# Patient Record
Sex: MALE | Race: WHITE | Employment: OTHER | URBAN - METROPOLITAN AREA
[De-identification: names, ages, dates, MRNs, and addresses within clinical notes are randomized per-mention and may not be internally consistent; named-entity substitution may affect disease eponyms.]

---

## 2017-07-18 ENCOUNTER — HOSPITAL ENCOUNTER (OUTPATIENT)
Dept: WOUND CARE | Age: 42
Discharge: HOME | End: 2017-07-18
Attending: FAMILY MEDICINE | Admitting: FAMILY MEDICINE

## 2017-07-18 VITALS
SYSTOLIC BLOOD PRESSURE: 121 MMHG | WEIGHT: 265 LBS | BODY MASS INDEX: 37.94 KG/M2 | DIASTOLIC BLOOD PRESSURE: 78 MMHG | TEMPERATURE: 97.3 F | HEART RATE: 74 BPM | RESPIRATION RATE: 16 BRPM | HEIGHT: 70 IN

## 2017-07-18 DIAGNOSIS — E11.621 DIABETIC FOOT ULCER WITH OSTEOMYELITIS (HCC): ICD-10-CM

## 2017-07-18 DIAGNOSIS — E11.69 DIABETIC FOOT ULCER WITH OSTEOMYELITIS (HCC): ICD-10-CM

## 2017-07-18 DIAGNOSIS — L97.509 DIABETIC FOOT ULCER WITH OSTEOMYELITIS (HCC): ICD-10-CM

## 2017-07-18 DIAGNOSIS — M86.9 DIABETIC FOOT ULCER WITH OSTEOMYELITIS (HCC): ICD-10-CM

## 2017-07-18 DIAGNOSIS — L97.521 PLANTAR ULCER OF LEFT FOOT, LIMITED TO BREAKDOWN OF SKIN (HCC): ICD-10-CM

## 2017-07-18 PROBLEM — L97.519 DIABETIC ULCER OF BOTH FEET (HCC): Status: ACTIVE | Noted: 2017-07-18

## 2017-07-18 PROBLEM — L97.529 DIABETIC ULCER OF BOTH FEET (HCC): Status: ACTIVE | Noted: 2017-07-18

## 2017-07-18 ASSESSMENT — PAIN DESCRIPTION - FREQUENCY: FREQUENCY: CONTINUOUS

## 2017-07-18 ASSESSMENT — PAIN SCALES - GENERAL: PAINLEVEL_OUTOF10: 6

## 2017-07-18 ASSESSMENT — PAIN DESCRIPTION - PAIN TYPE: TYPE: ACUTE PAIN

## 2017-07-18 ASSESSMENT — PAIN DESCRIPTION - PROGRESSION: CLINICAL_PROGRESSION: NOT CHANGED

## 2017-07-18 ASSESSMENT — PAIN DESCRIPTION - ORIENTATION: ORIENTATION: LEFT;RIGHT

## 2017-07-18 ASSESSMENT — PAIN DESCRIPTION - LOCATION: LOCATION: FOOT

## 2017-07-18 ASSESSMENT — PAIN DESCRIPTION - DESCRIPTORS: DESCRIPTORS: THROBBING

## 2017-07-18 ASSESSMENT — PAIN DESCRIPTION - ONSET: ONSET: ON-GOING

## 2017-07-24 ENCOUNTER — HOSPITAL ENCOUNTER (OUTPATIENT)
Dept: GENERAL RADIOLOGY | Age: 42
Discharge: HOME | End: 2017-07-24
Attending: FAMILY MEDICINE | Admitting: FAMILY MEDICINE

## 2017-07-24 LAB
ALBUMIN SERPL-MCNC: 3.8 GM/DL (ref 3.4–5)
ALP BLD-CCNC: 73 IU/L (ref 40–128)
ALT SERPL-CCNC: 19 U/L (ref 10–40)
ANION GAP SERPL CALCULATED.3IONS-SCNC: 12 MMOL/L (ref 4–16)
AST SERPL-CCNC: 15 IU/L (ref 15–37)
BILIRUB SERPL-MCNC: 0.4 MG/DL (ref 0–1)
BUN BLDV-MCNC: 18 MG/DL (ref 6–23)
CALCIUM SERPL-MCNC: 8.9 MG/DL (ref 8.3–10.6)
CHLORIDE BLD-SCNC: 98 MMOL/L (ref 99–110)
CHOLESTEROL: 173 MG/DL
CO2: 26 MMOL/L (ref 21–32)
CREAT SERPL-MCNC: 0.7 MG/DL (ref 0.9–1.3)
CREATININE URINE: 78.2 MG/DL (ref 39–259)
CULTURE: NORMAL
ERYTHROCYTE SEDIMENTATION RATE: 46 MM/HR (ref 0–15)
ESTIMATED AVERAGE GLUCOSE: 283 MG/DL
GFR AFRICAN AMERICAN: >60 ML/MIN/1.73M2
GFR NON-AFRICAN AMERICAN: >60 ML/MIN/1.73M2
GLUCOSE BLD-MCNC: 341 MG/DL (ref 70–140)
HBA1C MFR BLD: 11.5 % (ref 4.2–6.3)
HCT VFR BLD CALC: 42.1 % (ref 42–52)
HDLC SERPL-MCNC: 35 MG/DL
HEMOGLOBIN: 14.9 GM/DL (ref 13.5–18)
LDL CHOLESTEROL CALCULATED: 92 MG/DL
MAGNESIUM: 2.2 MG/DL (ref 1.8–2.4)
MCH RBC QN AUTO: 30.9 PG (ref 27–31)
MCHC RBC AUTO-ENTMCNC: 35.4 % (ref 32–36)
MCV RBC AUTO: 87.3 FL (ref 78–100)
MICROALBUMIN/CREAT 24H UR: 91 MG/DL
MICROALBUMIN/CREAT 24H UR: ABNORMAL MG/G{CREAT}
MICROALBUMIN/CREAT UR-RTO: 1163.7 MG/G CREAT (ref 0–30)
MICROALBUMIN/CREAT UR-RTO: ABNORMAL
ORGANISM: NORMAL
PDW BLD-RTO: 11.9 % (ref 11.7–14.9)
PLATELET # BLD: 208 K/CU MM (ref 140–440)
PMV BLD AUTO: 11.3 FL (ref 7.5–11.1)
POTASSIUM SERPL-SCNC: 4.6 MMOL/L (ref 3.5–5.1)
RBC # BLD: 4.82 M/CU MM (ref 4.6–6.2)
REPORT STATUS: NORMAL
REQUEST PROBLEM: NORMAL
SODIUM BLD-SCNC: 136 MMOL/L (ref 135–145)
SPECIMEN: NORMAL
T4 FREE: 1.25 NG/DL (ref 0.9–1.8)
TOTAL PROTEIN: 6.8 GM/DL (ref 6.4–8.2)
TRIGL SERPL-MCNC: 231 MG/DL
TSH HIGH SENSITIVITY: 2.3 UIU/ML (ref 0.27–4.2)
VITAMIN D 25-HYDROXY: 7.29 NG/ML
WBC # BLD: 9.1 K/CU MM (ref 4–10.5)

## 2017-07-26 ENCOUNTER — HOSPITAL ENCOUNTER (OUTPATIENT)
Dept: WOUND CARE | Age: 42
Discharge: HOME | End: 2017-07-26
Attending: INTERNAL MEDICINE | Admitting: INTERNAL MEDICINE

## 2017-07-26 VITALS
RESPIRATION RATE: 16 BRPM | TEMPERATURE: 97 F | DIASTOLIC BLOOD PRESSURE: 82 MMHG | HEART RATE: 78 BPM | SYSTOLIC BLOOD PRESSURE: 135 MMHG

## 2017-07-26 DIAGNOSIS — L97.512 DIABETIC ULCER OF RIGHT FOOT ASSOCIATED WITH TYPE 2 DIABETES MELLITUS, WITH FAT LAYER EXPOSED (HCC): ICD-10-CM

## 2017-07-26 DIAGNOSIS — E11.621 DIABETIC ULCER OF RIGHT FOOT ASSOCIATED WITH TYPE 2 DIABETES MELLITUS, WITH FAT LAYER EXPOSED (HCC): ICD-10-CM

## 2017-07-26 DIAGNOSIS — E11.621 DIABETIC ULCER OF LEFT FOOT ASSOCIATED WITH TYPE 2 DIABETES MELLITUS, WITH FAT LAYER EXPOSED (HCC): Primary | ICD-10-CM

## 2017-07-26 DIAGNOSIS — L97.522 DIABETIC ULCER OF LEFT FOOT ASSOCIATED WITH TYPE 2 DIABETES MELLITUS, WITH FAT LAYER EXPOSED (HCC): Primary | ICD-10-CM

## 2017-07-26 PROCEDURE — 11042 DBRDMT SUBQ TIS 1ST 20SQCM/<: CPT | Performed by: INTERNAL MEDICINE

## 2017-07-26 PROCEDURE — 97597 DBRDMT OPN WND 1ST 20 CM/<: CPT | Performed by: INTERNAL MEDICINE

## 2017-07-26 RX ORDER — CIPROFLOXACIN 500 MG/1
500 TABLET, FILM COATED ORAL 2 TIMES DAILY
Qty: 20 TABLET | Refills: 0 | Status: SHIPPED | OUTPATIENT
Start: 2017-07-26 | End: 2017-08-05

## 2017-08-01 ENCOUNTER — HOSPITAL ENCOUNTER (OUTPATIENT)
Dept: WOUND CARE | Age: 42
Discharge: HOME | End: 2017-08-01
Attending: FAMILY MEDICINE | Admitting: FAMILY MEDICINE

## 2017-08-01 VITALS
TEMPERATURE: 97.6 F | DIASTOLIC BLOOD PRESSURE: 85 MMHG | SYSTOLIC BLOOD PRESSURE: 144 MMHG | RESPIRATION RATE: 16 BRPM | HEART RATE: 83 BPM

## 2017-08-01 DIAGNOSIS — E11.621 DIABETIC ULCER OF TOE OF LEFT FOOT ASSOCIATED WITH TYPE 2 DIABETES MELLITUS, WITH FAT LAYER EXPOSED (HCC): Primary | ICD-10-CM

## 2017-08-01 DIAGNOSIS — L97.522 DIABETIC ULCER OF TOE OF LEFT FOOT ASSOCIATED WITH TYPE 2 DIABETES MELLITUS, WITH FAT LAYER EXPOSED (HCC): Primary | ICD-10-CM

## 2017-08-01 DIAGNOSIS — E11.621 DIABETIC ULCER OF TOE OF RIGHT FOOT ASSOCIATED WITH TYPE 2 DIABETES MELLITUS, WITH FAT LAYER EXPOSED (HCC): ICD-10-CM

## 2017-08-01 DIAGNOSIS — L97.512 DIABETIC ULCER OF TOE OF RIGHT FOOT ASSOCIATED WITH TYPE 2 DIABETES MELLITUS, WITH FAT LAYER EXPOSED (HCC): ICD-10-CM

## 2017-08-18 ENCOUNTER — HOSPITAL ENCOUNTER (OUTPATIENT)
Dept: WOUND CARE | Age: 42
Discharge: HOME | End: 2017-08-18
Attending: INTERNAL MEDICINE | Admitting: INTERNAL MEDICINE

## 2017-08-18 VITALS
RESPIRATION RATE: 16 BRPM | HEART RATE: 85 BPM | SYSTOLIC BLOOD PRESSURE: 114 MMHG | TEMPERATURE: 96.8 F | DIASTOLIC BLOOD PRESSURE: 62 MMHG

## 2017-08-18 DIAGNOSIS — L97.512 DIABETIC ULCER OF TOE OF RIGHT FOOT ASSOCIATED WITH TYPE 2 DIABETES MELLITUS, WITH FAT LAYER EXPOSED (HCC): ICD-10-CM

## 2017-08-18 DIAGNOSIS — L97.522 DIABETIC ULCER OF TOE OF LEFT FOOT ASSOCIATED WITH TYPE 2 DIABETES MELLITUS, WITH FAT LAYER EXPOSED (HCC): Primary | ICD-10-CM

## 2017-08-18 DIAGNOSIS — E11.621 DIABETIC ULCER OF TOE OF RIGHT FOOT ASSOCIATED WITH TYPE 2 DIABETES MELLITUS, WITH FAT LAYER EXPOSED (HCC): ICD-10-CM

## 2017-08-18 DIAGNOSIS — E11.621 DIABETIC ULCER OF TOE OF LEFT FOOT ASSOCIATED WITH TYPE 2 DIABETES MELLITUS, WITH FAT LAYER EXPOSED (HCC): Primary | ICD-10-CM

## 2017-08-18 RX ORDER — AMMONIUM LACTATE 12 G/100G
LOTION TOPICAL
Qty: 225 G | Refills: 1 | Status: SHIPPED | OUTPATIENT
Start: 2017-08-18 | End: 2018-05-18

## 2017-08-18 ASSESSMENT — PAIN SCALES - GENERAL: PAINLEVEL_OUTOF10: 6

## 2017-08-18 ASSESSMENT — PAIN DESCRIPTION - FREQUENCY: FREQUENCY: CONTINUOUS

## 2017-08-18 ASSESSMENT — PAIN DESCRIPTION - ORIENTATION: ORIENTATION: LEFT

## 2017-08-18 ASSESSMENT — PAIN DESCRIPTION - LOCATION: LOCATION: FOOT

## 2017-08-18 ASSESSMENT — PAIN DESCRIPTION - PROGRESSION: CLINICAL_PROGRESSION: NOT CHANGED

## 2017-08-18 ASSESSMENT — PAIN DESCRIPTION - ONSET: ONSET: ON-GOING

## 2017-08-18 ASSESSMENT — PAIN DESCRIPTION - DESCRIPTORS: DESCRIPTORS: ACHING;TENDER

## 2017-08-18 ASSESSMENT — PAIN DESCRIPTION - PAIN TYPE: TYPE: ACUTE PAIN

## 2017-08-25 ENCOUNTER — HOSPITAL ENCOUNTER (OUTPATIENT)
Dept: WOUND CARE | Age: 42
Discharge: HOME | End: 2017-08-25
Attending: INTERNAL MEDICINE | Admitting: INTERNAL MEDICINE

## 2017-08-25 VITALS — RESPIRATION RATE: 16 BRPM | TEMPERATURE: 96.6 F

## 2017-08-25 DIAGNOSIS — L97.522 DIABETIC ULCER OF TOE OF LEFT FOOT ASSOCIATED WITH TYPE 2 DIABETES MELLITUS, WITH FAT LAYER EXPOSED (HCC): Primary | ICD-10-CM

## 2017-08-25 DIAGNOSIS — L97.512 DIABETIC ULCER OF TOE OF RIGHT FOOT ASSOCIATED WITH TYPE 2 DIABETES MELLITUS, WITH FAT LAYER EXPOSED (HCC): ICD-10-CM

## 2017-08-25 DIAGNOSIS — E11.621 DIABETIC ULCER OF TOE OF RIGHT FOOT ASSOCIATED WITH TYPE 2 DIABETES MELLITUS, WITH FAT LAYER EXPOSED (HCC): ICD-10-CM

## 2017-08-25 DIAGNOSIS — E11.621 DIABETIC ULCER OF TOE OF LEFT FOOT ASSOCIATED WITH TYPE 2 DIABETES MELLITUS, WITH FAT LAYER EXPOSED (HCC): Primary | ICD-10-CM

## 2017-08-25 ASSESSMENT — PAIN DESCRIPTION - ORIENTATION: ORIENTATION: LEFT

## 2017-08-25 ASSESSMENT — PAIN DESCRIPTION - PAIN TYPE: TYPE: CHRONIC PAIN

## 2017-08-25 ASSESSMENT — PAIN DESCRIPTION - LOCATION: LOCATION: FOOT

## 2017-08-25 ASSESSMENT — PAIN DESCRIPTION - ONSET: ONSET: ON-GOING

## 2017-08-25 ASSESSMENT — PAIN DESCRIPTION - FREQUENCY: FREQUENCY: CONTINUOUS

## 2017-08-25 ASSESSMENT — PAIN SCALES - GENERAL: PAINLEVEL_OUTOF10: 5

## 2017-08-25 ASSESSMENT — PAIN DESCRIPTION - DESCRIPTORS: DESCRIPTORS: SORE;ACHING

## 2017-08-25 ASSESSMENT — PAIN DESCRIPTION - PROGRESSION: CLINICAL_PROGRESSION: NOT CHANGED

## 2017-08-31 ENCOUNTER — HOSPITAL ENCOUNTER (OUTPATIENT)
Dept: WOUND CARE | Age: 42
Discharge: HOME | End: 2017-08-31
Attending: ORTHOPAEDIC SURGERY | Admitting: ORTHOPAEDIC SURGERY

## 2017-08-31 VITALS
HEART RATE: 84 BPM | SYSTOLIC BLOOD PRESSURE: 124 MMHG | DIASTOLIC BLOOD PRESSURE: 80 MMHG | TEMPERATURE: 98.7 F | RESPIRATION RATE: 18 BRPM

## 2017-08-31 DIAGNOSIS — E11.621 DIABETIC ULCER OF LEFT MIDFOOT ASSOCIATED WITH TYPE 2 DIABETES MELLITUS, WITH FAT LAYER EXPOSED (HCC): Primary | ICD-10-CM

## 2017-08-31 DIAGNOSIS — L97.422 DIABETIC ULCER OF LEFT MIDFOOT ASSOCIATED WITH TYPE 2 DIABETES MELLITUS, WITH FAT LAYER EXPOSED (HCC): Primary | ICD-10-CM

## 2017-08-31 PROBLEM — L97.512 DIABETIC ULCER OF RIGHT FOOT ASSOCIATED WITH TYPE 2 DIABETES MELLITUS, WITH FAT LAYER EXPOSED (HCC): Status: RESOLVED | Noted: 2017-07-18 | Resolved: 2017-08-31

## 2017-09-12 ENCOUNTER — HOSPITAL ENCOUNTER (OUTPATIENT)
Dept: WOUND CARE | Age: 42
Discharge: HOME | End: 2017-09-12
Attending: NURSE PRACTITIONER | Admitting: NURSE PRACTITIONER

## 2017-09-12 VITALS
DIASTOLIC BLOOD PRESSURE: 95 MMHG | TEMPERATURE: 97.1 F | SYSTOLIC BLOOD PRESSURE: 139 MMHG | HEART RATE: 83 BPM | RESPIRATION RATE: 18 BRPM

## 2017-09-12 DIAGNOSIS — E11.621 DIABETIC ULCER OF LEFT MIDFOOT ASSOCIATED WITH TYPE 2 DIABETES MELLITUS, WITH FAT LAYER EXPOSED (HCC): Primary | ICD-10-CM

## 2017-09-12 DIAGNOSIS — L97.422 DIABETIC ULCER OF LEFT MIDFOOT ASSOCIATED WITH TYPE 2 DIABETES MELLITUS, WITH FAT LAYER EXPOSED (HCC): Primary | ICD-10-CM

## 2017-09-12 PROCEDURE — 11042 DBRDMT SUBQ TIS 1ST 20SQCM/<: CPT | Performed by: NURSE PRACTITIONER

## 2017-09-12 ASSESSMENT — PAIN DESCRIPTION - ORIENTATION: ORIENTATION: LEFT

## 2017-09-12 ASSESSMENT — PAIN DESCRIPTION - FREQUENCY: FREQUENCY: CONTINUOUS

## 2017-09-12 ASSESSMENT — PAIN DESCRIPTION - LOCATION: LOCATION: FOOT

## 2017-09-12 ASSESSMENT — PAIN SCALES - GENERAL: PAINLEVEL_OUTOF10: 5

## 2017-09-12 ASSESSMENT — PAIN DESCRIPTION - ONSET: ONSET: ON-GOING

## 2017-09-12 ASSESSMENT — PAIN DESCRIPTION - PROGRESSION: CLINICAL_PROGRESSION: NOT CHANGED

## 2017-09-12 ASSESSMENT — PAIN DESCRIPTION - PAIN TYPE: TYPE: CHRONIC PAIN

## 2017-09-12 ASSESSMENT — PAIN DESCRIPTION - DESCRIPTORS: DESCRIPTORS: TENDER;THROBBING

## 2017-09-16 LAB
CULTURE: NORMAL
ORGANISM: NORMAL
REPORT STATUS: NORMAL
REQUEST PROBLEM: NORMAL
SPECIMEN: NORMAL

## 2017-12-03 PROBLEM — L02.91 ABSCESS: Status: ACTIVE | Noted: 2017-12-03

## 2017-12-08 ENCOUNTER — HOSPITAL ENCOUNTER (OUTPATIENT)
Dept: WOUND CARE | Age: 42
Discharge: HOME | End: 2017-12-08
Attending: ORTHOPAEDIC SURGERY | Admitting: ORTHOPAEDIC SURGERY

## 2017-12-08 VITALS
DIASTOLIC BLOOD PRESSURE: 83 MMHG | HEART RATE: 91 BPM | SYSTOLIC BLOOD PRESSURE: 124 MMHG | TEMPERATURE: 97.1 F | BODY MASS INDEX: 37.22 KG/M2 | WEIGHT: 260 LBS | HEIGHT: 70 IN

## 2017-12-08 DIAGNOSIS — E11.621 DIABETIC ULCER OF LEFT MIDFOOT ASSOCIATED WITH TYPE 2 DIABETES MELLITUS, WITH FAT LAYER EXPOSED (HCC): Primary | ICD-10-CM

## 2017-12-08 DIAGNOSIS — L97.422 DIABETIC ULCER OF LEFT MIDFOOT ASSOCIATED WITH TYPE 2 DIABETES MELLITUS, WITH FAT LAYER EXPOSED (HCC): Primary | ICD-10-CM

## 2017-12-08 ASSESSMENT — PAIN DESCRIPTION - FREQUENCY: FREQUENCY: CONTINUOUS

## 2017-12-08 ASSESSMENT — PAIN DESCRIPTION - ONSET: ONSET: ON-GOING

## 2017-12-08 ASSESSMENT — PAIN SCALES - GENERAL: PAINLEVEL_OUTOF10: 7

## 2017-12-08 ASSESSMENT — PAIN DESCRIPTION - DESCRIPTORS: DESCRIPTORS: STABBING;SORE

## 2017-12-08 ASSESSMENT — PAIN DESCRIPTION - ORIENTATION: ORIENTATION: LEFT

## 2017-12-08 ASSESSMENT — PAIN DESCRIPTION - PROGRESSION: CLINICAL_PROGRESSION: NOT CHANGED

## 2017-12-08 ASSESSMENT — PAIN DESCRIPTION - LOCATION: LOCATION: FOOT

## 2017-12-08 NOTE — PROGRESS NOTES
[address] [facility] Initial Visit      Erendira Andrade  AGE: 43 y.o. GENDER: male  : 1975  EPISODE DATE:  2017   Referred by:    Subjective:     CHIEF COMPLAINT ulcer left foot and surgical wound abdomen     HISTORY of PRESENT ILLNESS      Erendira Andrade is a 43 y.o. male who presents to the Wound Clinic for an initial visit for evaluation and treatment of Chronic diabetic and non-healing surgical  wound(s) of  Lt. foot planter, abdomen. The condition is of marked severity. The wound has been present for    Weeks left foot and abdomen 3 days. The underlying cause is thought to be diabetes. The patients care to date has included surgery for abscess abdomen. The patient has significant underlying medical conditions as below. Wound Pain Timing/Severity: none  Quality of pain: N/A  Severity of pain:  0 / 10   Modifying Factors: diabetes, chronic pressure and obesity  Associated Signs/Symptoms: none        PAST MEDICAL HISTORY        Diagnosis Date    Abscess     scrotal    Anxiety associated with depression     Back pain 2012    Chest pain 2013    Diabetic nephropathy ([address]     Diabetic neuropathy ([address] 2011    Diabetic ulcer of left midfoot associated with type 2 diabetes mellitus, with fat layer exposed ([address] 2017    Diverticulosis     C scope + Dr. Kellee Allen DM (diabetes mellitus), type 2 ([address]     DR. Turner podiatry    Hyperlipidemia LDL goal < 100 7/15/2013    Hypertension     Internal hemorrhoid     C scope + Dr. Katelyn Beal fracture 1988    Panic attacks     Pericarditis     Hospitalized with s/p heart cath normal    Peripheral autonomic neuropathy due to diabetes mellitus ([address]     Axonal EMG- NCS, 2011    Pneumonia 1990    Sebaceous cyst 2011    URI (upper respiratory infection) 2012    WD-Wound, surgical, infected, initial encounter 2017    Wrist fracture 1986       PAST SURGICAL HISTORY    Past Surgical History:   Procedure Laterality Date    ABDOMEN SURGERY      CARDIAC CATHETERIZATION  2003, 2013    Normal (Dr. Francisco Sorenson)    COLONOSCOPY  6/2/2011    Pandiverticulosis, Nonbleeding internal hemorrhoids, Repeat colonoscopy at age 48- Dr. Baldwin Cancer    \"had reconstruction surgery on nose a year after the other nose surgery\"    OTHER SURGICAL HISTORY Right 05/22/2015    I & D right great toe with partial amputation    OTHER SURGICAL HISTORY  12/04/2017    inc and drainage of abcess    SKIN BIOPSY N/A [id number]    sebaceous cyst removal times 4     TOE AMPUTATION      right great toe    TONSILLECTOMY AND ADENOIDECTOMY  1988    UPPER GASTROINTESTINAL ENDOSCOPY  06/2/2011    Mild gastritis with moderatley severe antritis, small hiatal hernia, Dr. Aris Ho History   Problem Relation Age of Onset    Cancer Mother      ?site    Stroke Mother     Bleeding Prob Mother     Diabetes Father     Heart Disease Father     High Blood Pressure Father     Obesity Father     Kidney Disease Father     Diabetes Sister     High Blood Pressure Sister     Mental Illness Sister     Obesity Sister     High Blood Pressure Other     Mental Illness Other      bipolar    Other Son      cyst in ear canal    ADHD Daughter        SOCIAL HISTORY    Social History   Substance Use Topics    Smoking status: Light Tobacco Smoker     Packs/day: 0.25     Years: 20.00     Last attempt to quit: 2/17/2013    Smokeless tobacco: Never Used    Alcohol use No      Comment: 4 cans soda daily, 2 cups tea daily/alchohol- average one drink per month       ALLERGIES    Allergies   Allergen Reactions    Adhesive Tape Rash       MEDICATIONS    Current Outpatient Prescriptions on File Prior to Encounter   Medication Sig Dispense Refill    insulin glargine (LANTUS) 100 UNIT/ML injection vial Inject 60 Units into the skin nightly 1 vial 3    insulin lispro (HUMALOG) 100 UNIT/ML injection vial Inject 30 Undermining Starts ___ O'Clock 0 12/8/2017  9:04 AM   Undermining Ends___ O'Clock 0 12/8/2017  9:04 AM   Undermining Maxium Distance (cm) 0 12/8/2017  9:04 AM   Wound Assessment Red 12/8/2017  9:04 AM   Drainage Amount Moderate 12/8/2017  9:04 AM   Drainage Description Serosanguinous 12/8/2017  9:04 AM   Odor None 12/8/2017  9:04 AM   Margins Defined edges 12/8/2017  9:04 AM   Shazia-wound Assessment Dry;Calloused 12/8/2017  9:04 AM   Non-staged Wound Description Full thickness 12/8/2017  9:04 AM   [place]%Wound Bed 0 12/8/2017  9:04 AM   Red%Wound Bed 100 12/8/2017  9:04 AM   Yellow%Wound Bed 0 12/8/2017  9:04 AM   Black%Wound Bed 0 12/8/2017  9:04 AM   Purple%Wound Bed 00 12/8/2017  9:04 AM   Other%Wound Bed 0 12/8/2017  9:04 AM   Debridement per physician Subcutaneous 12/8/2017  9:30 AM   Number of days: 0       Incision 12/04/17 Abdomen Mid (Active)   Wound Assessment Clean;Dry; Intact 12/6/2017 11:01 AM   Shazia-wound Assessment DANILO 12/4/2017 10:12 PM   Closure None 12/4/2017  7:50 PM   Drainage Amount Small 12/6/2017 11:01 AM   Drainage Description Sanguinous 12/6/2017 11:01 AM   Odor None 12/6/2017 11:01 AM   Dressing/Treatment Dry dressing 12/6/2017 11:01 AM   Dressing Changed Changed/New 12/6/2017 11:01 AM   Dressing Status Clean;Dry; Intact 12/6/2017 11:01 AM   Dressing Change Due 12/06/17 12/6/2017 11:01 AM   Number of days: 3       Percent of Wound(s) Debrided: 100%    Total  Area  Debrided:  0.5 sq cm     Bleeding:  Minimal    Hemostasis Achieved:  by pressure    Procedural Pain:  0  / 10     Post Procedural Pain:  0 / 10     Response to treatment:  Well tolerated by patient. Plan:     Discharge instructions:   PHYSICIAN ORDERS AND DISCHARGE INSTRUCTIONS      NOTE: [facility] will receive a patient experience survey.  We would be grateful if you would take the time to fill this survey out.      Wound care order history:                            BAIRON's: OBTAINED ON 7/18/17 AS FOLLOWS: Right - 1.21 & Left - 1.3                            Vascular studies: ORDERED ARTERIAL STUDIES ON 7/18/17. PATIENT MISSED APPT. RESCHEDULED 9/29/2017. PATIENT HAD ARTERIAL STUDY 12/4/17 NO STENOSIS                             Imaging: X-RAY OF LEFT FOOT DONE ON 6/6/17 - NO OSTEO NOTED                            Cultures: OBTAINED CULTURE OF LEFT PLANTAR ON 7/18/17 AT THE [facility]--REVIEWED 7/26/2017 - ORDERED CIPRO                                          OBTAINED CULTURE OF LEFT PLANTAR ON 9/12/2017AT THE [facility]--                            Antibiotics: FINISHED CIPRO 500MG, TAKE 1 TAB 2X/DAILY FOR 10 DAYS (SCRIPT GIVEN ON 7/26/17)                        Labs/ HbA1c: LAST A1C WAS 11.5 ON 7/24/17                Earlier Wound care treatments:              [id number]:                                            Consults:                                               [facility] care physician: DR Honey Escudero  Continuing wound care orders and information:              Residence: AT HOME               Continue home health care with: FAX TO [facility]                            Your wound-care supplies will be provided by: [facility] provider: Avani Gavin with: [facility] TO [facility]LE                        Off loading: FRONT OFFLOADING SHOE FOR LEFT FOOT GIVEN ON 7/18/17                        Wound Medications: RX: SANTYL 30G WITH 1 REFILL (SCRIPT GIVEN ON 7/18/17)      Wound cleansing:                                               Do not scrub or use excessive force.                                              Wash hands with soap and water before and after dressing changes.                                               TXWKU to applying a clean dressing, cleanse wound with normal saline,                                                    wound cleanser, or mild soap and water.                                               Ask the physician or nurse before getting the wound(s) wet in a shower          Daily Wound management:                                              Keep weight off wounds and reposition every 2 hours.                                              Avoid standing for long periods of time.                                              Apply wraps/stockings in AM and remove at bedtime.                                              If swelling is present, elevate legs to the level of the heart or above for 30 minutes 4-5 times a day and/or when sitting.                                                                                          When taking antibiotics take entire prescription as ordered by MD do not stop taking until medicine is all gone.                                DISCUSSED IMPORTANCE OF CONTROLLING BLOOD SUGARS          WOUND CARE: 12/8/17      PERFORM DAILY FOOT CARE AS FOLLOWS: CLEANSE BILATERAL FEET WITH MILD SOAP & WATER, RINSE THOROUGHLY & PAT DRY. INSPECT FEET DAILY FOR CALLOUSES, WOUNDS OR ANY OTHER ABNORMALITIES. PT TO APPLY LOTION (GOLD BOND OR LUBRIDERM) 2X/DAILY. PT TO FOLLOW UP WITH PODIATRIST MONTHLY.       DISCUSSED A CAST 12/8/17     ORDER SUPPLIES WITH JASMIN 12/8/17       LEFT PLANTAR   --APPLY BETADINE MOIST 2X2  ABD PAD, KERLIX, TAPE & TUBI E, CHANGE DAILY. MAY REMOVE TUBIGRIP AT BEDTIME & RE-APPLY IN THE MORNING.  PLAN TO APPLY      ABDOMEN- PACK WITH BETADINE MOIST 1/2IN PLAIN PACKING ABD HYPAFIX OR PAPER TAPE CHANGE EVERY OTHER DAY           Follow up with DR ARCHIBALD NEXT WED AM in the wound care center      Call [phone] for any questions or concerns.        Treatment Note Wound 12/08/17 #3 abdoment (onset 3 days ago) SURGICAL-Dressing/Treatment:  (betadine moist plain packing abd hyperfix tape)  Wound 12/08/17 #4 Lt plantar (onset 6 months ago) DIABETIC ALCALA 1-Dressing/Treatment:  (betadine moist 2x2 abd kerlix tape tubi e)    Written Patient Dismissal Instructions Given            Electronically signed by Darlin Taylor MD

## 2017-12-12 ENCOUNTER — TELEPHONE (OUTPATIENT)
Dept: SURGERY | Age: 42
End: 2017-12-12

## 2017-12-12 NOTE — TELEPHONE ENCOUNTER
Spoke with Sharri Meza @ home Care in regard to daily dressing changes @ umbilical site and elevated BP. Advised to call PCP in regard to elevated BP and monitoring.

## 2017-12-13 ENCOUNTER — OFFICE VISIT (OUTPATIENT)
Dept: SURGERY | Age: 42
End: 2017-12-13

## 2017-12-13 ENCOUNTER — HOSPITAL ENCOUNTER (OUTPATIENT)
Dept: WOUND CARE | Age: 42
Discharge: HOME | End: 2017-12-13
Attending: ORTHOPAEDIC SURGERY | Admitting: ORTHOPAEDIC SURGERY

## 2017-12-13 VITALS
SYSTOLIC BLOOD PRESSURE: 124 MMHG | BODY MASS INDEX: 37.21 KG/M2 | WEIGHT: 259.92 LBS | DIASTOLIC BLOOD PRESSURE: 82 MMHG | HEART RATE: 88 BPM | TEMPERATURE: 96.3 F | HEIGHT: 70 IN

## 2017-12-13 VITALS
TEMPERATURE: 98.1 F | RESPIRATION RATE: 18 BRPM | DIASTOLIC BLOOD PRESSURE: 89 MMHG | HEART RATE: 81 BPM | SYSTOLIC BLOOD PRESSURE: 149 MMHG

## 2017-12-13 DIAGNOSIS — L02.91 ABSCESS: Primary | ICD-10-CM

## 2017-12-13 DIAGNOSIS — L97.422 DIABETIC ULCER OF LEFT MIDFOOT ASSOCIATED WITH TYPE 2 DIABETES MELLITUS, WITH FAT LAYER EXPOSED (HCC): Primary | ICD-10-CM

## 2017-12-13 DIAGNOSIS — E11.621 DIABETIC ULCER OF LEFT MIDFOOT ASSOCIATED WITH TYPE 2 DIABETES MELLITUS, WITH FAT LAYER EXPOSED (HCC): Primary | ICD-10-CM

## 2017-12-13 PROCEDURE — 99024 POSTOP FOLLOW-UP VISIT: CPT | Performed by: SURGERY

## 2017-12-13 ASSESSMENT — ENCOUNTER SYMPTOMS
BACK PAIN: 0
RECTAL PAIN: 0
EYE REDNESS: 0
SORE THROAT: 0
CHOKING: 0
COLOR CHANGE: 0
APNEA: 0
STRIDOR: 0
PHOTOPHOBIA: 0
EYE ITCHING: 0
CONSTIPATION: 0
ANAL BLEEDING: 0

## 2017-12-13 ASSESSMENT — PAIN DESCRIPTION - FREQUENCY: FREQUENCY: CONTINUOUS

## 2017-12-13 ASSESSMENT — PAIN DESCRIPTION - ORIENTATION: ORIENTATION: LEFT

## 2017-12-13 ASSESSMENT — PAIN DESCRIPTION - LOCATION: LOCATION: FOOT

## 2017-12-13 ASSESSMENT — PAIN DESCRIPTION - PROGRESSION: CLINICAL_PROGRESSION: NOT CHANGED

## 2017-12-13 ASSESSMENT — PAIN SCALES - GENERAL: PAINLEVEL_OUTOF10: 7

## 2017-12-13 ASSESSMENT — PAIN DESCRIPTION - ONSET: ONSET: GRADUAL

## 2017-12-13 ASSESSMENT — PAIN DESCRIPTION - DESCRIPTORS: DESCRIPTORS: TENDER;SORE

## 2017-12-13 ASSESSMENT — PAIN DESCRIPTION - PAIN TYPE: TYPE: ACUTE PAIN

## 2017-12-13 NOTE — PROGRESS NOTES
12/13/2017  8:20 AM   Wound Width (cm) 2.9 cm 12/13/2017  8:20 AM   Wound Depth (cm)  2.4 12/13/2017  8:20 AM   Calculated Wound Size (cm^2) (l*w) 0.87 cm^2 12/13/2017  8:20 AM   Change in Wound Size % (l*w) -52.63 12/13/2017  8:20 AM   Distance Tunneling (cm) 0 cm 12/13/2017  8:20 AM   Tunneling Position ___ O'Clock 0 12/13/2017  8:20 AM   Undermining Starts ___ O'Clock 9 12/13/2017  8:20 AM   Undermining Ends___ O'Clock 12 12/13/2017  8:20 AM   Undermining Maxium Distance (cm) 1.1 12/13/2017  8:20 AM   Wound Assessment Red 12/13/2017  8:20 AM   Drainage Amount Moderate 12/13/2017  8:20 AM   Drainage Description Serosanguinous 12/13/2017  8:20 AM   Odor None 12/13/2017  8:20 AM   Margins Defined edges 12/13/2017  8:20 AM   Shazia-wound Assessment Dry 12/13/2017  8:20 AM   Non-staged Wound Description Full thickness 12/13/2017  8:20 AM   [place]%Wound Bed 0 12/13/2017  8:20 AM   Red%Wound Bed 100 12/13/2017  8:20 AM   Yellow%Wound Bed 0 12/13/2017  8:20 AM   Black%Wound Bed 0 12/13/2017  8:20 AM   Purple%Wound Bed 0 12/13/2017  8:20 AM   Other%Wound Bed 0 12/13/2017  8:20 AM   Number of days: 4       Wound 12/08/17 #4 Lt plantar (onset 6 months ago) DIABETIC ALCALA 1 (Active)   Wound Type Wound 12/13/2017  8:20 AM   Dressing Status Clean;Dry; Intact 12/13/2017  8:56 AM   Dressing Changed Changed/New 12/13/2017  8:56 AM   Wound Cleansed Wound cleanser 12/13/2017  8:20 AM   Wound Length (cm) 1 cm 12/13/2017  8:20 AM   Wound Width (cm) 0.1 cm 12/13/2017  8:20 AM   Wound Depth (cm)  0.1 12/13/2017  8:20 AM   Calculated Wound Size (cm^2) (l*w) 0.1 cm^2 12/13/2017  8:20 AM   Change in Wound Size % (l*w) -150 12/13/2017  8:20 AM   Distance Tunneling (cm) 0 cm 12/13/2017  8:20 AM   Tunneling Position ___ O'Clock 0 12/13/2017  8:20 AM   Undermining Starts ___ O'Clock 0 12/13/2017  8:20 AM   Undermining Ends___ O'Clock 0 12/13/2017  8:20 AM   Undermining Maxium Distance (cm) 0 12/13/2017  8:20 AM   Wound Assessment Red 12/13/2017 reposition every 2 hours.                                              Avoid standing for long periods of time.                                              Apply wraps/stockings in AM and remove at bedtime.                                              If swelling is present, elevate legs to the level of the heart or above for 30 minutes 4-5 times a day and/or when sitting.                                                                                          When taking antibiotics take entire prescription as ordered by MD do not stop taking until medicine is all gone.                                DISCUSSED IMPORTANCE OF CONTROLLING BLOOD SUGARS          WOUND CARE: 12/13/17      PERFORM DAILY FOOT CARE AS FOLLOWS: CLEANSE BILATERAL FEET WITH MILD SOAP & WATER, RINSE THOROUGHLY & PAT DRY. INSPECT FEET DAILY FOR CALLOUSES, WOUNDS OR ANY OTHER ABNORMALITIES. PT TO APPLY LOTION (GOLD BOND OR LUBRIDERM) 2X/DAILY. PT TO FOLLOW UP WITH PODIATRIST MONTHLY.       DISCUSSED A CAST 12/8/17 NO NEED AS OF 12/13/17      ORDER SUPPLIES WITH JASMIN 12/8/17       LEFT PLANTAR   --APPLY BETADINE MOIST 2X2  ABD PAD, KERLIX, TAPE & TUBI E, CHANGE DAILY. MAY REMOVE TUBIGRIP AT BEDTIME & RE-APPLY IN THE MORNING.  GET METATARSAL PADS SCRIPT GIVEN 12/8/17       ABDOMEN- PACK WITH BETADINE MOIST 1/2IN PLAIN PACKING ABD HYPAFIX OR PAPER TAPE CHANGE EVERY OTHER DAY           Follow up with DR ARCHIBALD NEXT WED AM in the wound care center      Call [phone] for any questions or concerns.      Date__________   Time____________  Mike Olvera  Physician Signature________________________________  Mike Olvera        Treatment Note Wound 12/08/17 #3 abdoment (onset 3 days ago) SURGICAL-Dressing/Treatment:  (betadine moist 1/2' plain packing abd hyperfix tape)  Wound 12/08/17 #4 Lt plantar (onset 6 months ago) DIABETIC ALCALA 1-Dressing/Treatment:  (betadine moist 2x2 abd kerlix tape tubi e)    Written Patient Dismissal Instructions Given Electronically signed by Hellen Lugo MD on 12/13/2017 at 9:06 AM

## 2017-12-13 NOTE — PROGRESS NOTES
Negative for anal bleeding, constipation and rectal pain. Endocrine: Negative for polydipsia. Genitourinary: Negative for enuresis, flank pain and hematuria. Musculoskeletal: Negative for back pain, joint swelling and myalgias. Skin: Negative for color change and pallor. Allergic/Immunologic: Negative for environmental allergies. Neurological: Negative for syncope and speech difficulty. Psychiatric/Behavioral: Negative for confusion and hallucinations. OBJECTIVE:  Physical Exam:    /82   Pulse 88   Temp 96.3 °F (35.7 °C)   Ht 5' 10\" (1.778 m)   Wt 259 lb 14.8 oz (117.9 kg)   BMI 37.29 kg/m²      Physical Exam   Constitutional: He is oriented to person, place, and time. He appears well-developed and well-nourished. HENT:   Head: Normocephalic. Eyes: Pupils are equal, round, and reactive to light. Neck: Normal range of motion. Neck supple. Cardiovascular: Normal rate. Pulmonary/Chest: Effort normal.   Abdominal: Soft. He exhibits no distension and no mass. There is no tenderness. There is no rebound and no guarding. Musculoskeletal: Normal range of motion. Neurological: He is alert and oriented to person, place, and time. Skin: Skin is warm. Psychiatric: He has a normal mood and affect. ASSESSMENT:  1. Abscess          PLAN:  Treatment:  Continue local wound care with the umbilical wound packing daily. .  Patient counseled on risks, benefits, and alternatives of treatment plan at length today. Patient states an understanding and willingness to proceed with plan. No orders of the defined types were placed in this encounter. No orders of the defined types were placed in this encounter. Follow Up:  Return in about 2 weeks (around 12/27/2017).       Hardeep Corado MD

## 2017-12-20 ENCOUNTER — HOSPITAL ENCOUNTER (OUTPATIENT)
Dept: WOUND CARE | Age: 42
Discharge: HOME | End: 2017-12-20
Attending: ORTHOPAEDIC SURGERY | Admitting: ORTHOPAEDIC SURGERY

## 2017-12-22 ENCOUNTER — TELEPHONE (OUTPATIENT)
Dept: SURGERY | Age: 42
End: 2017-12-22

## 2017-12-22 NOTE — TELEPHONE ENCOUNTER
Faxed face to face to Wound care center. Dr. Geri Nova following patient at wound care center. Verified with our  that Dr. Geri Nova should be the recipient.

## 2018-05-18 ENCOUNTER — HOSPITAL ENCOUNTER (OUTPATIENT)
Dept: WOUND CARE | Age: 43
Discharge: HOME | End: 2018-05-18
Attending: INTERNAL MEDICINE | Admitting: INTERNAL MEDICINE

## 2018-05-18 VITALS
TEMPERATURE: 98.1 F | BODY MASS INDEX: 37.94 KG/M2 | SYSTOLIC BLOOD PRESSURE: 134 MMHG | RESPIRATION RATE: 16 BRPM | DIASTOLIC BLOOD PRESSURE: 83 MMHG | WEIGHT: 265 LBS | HEIGHT: 70 IN | HEART RATE: 96 BPM

## 2018-05-18 DIAGNOSIS — E11.621 DIABETIC ULCER OF LEFT MIDFOOT ASSOCIATED WITH TYPE 2 DIABETES MELLITUS, WITH FAT LAYER EXPOSED (HCC): Primary | ICD-10-CM

## 2018-05-18 DIAGNOSIS — L97.422 DIABETIC ULCER OF LEFT MIDFOOT ASSOCIATED WITH TYPE 2 DIABETES MELLITUS, WITH FAT LAYER EXPOSED (HCC): Primary | ICD-10-CM

## 2018-05-18 ASSESSMENT — PAIN DESCRIPTION - ORIENTATION: ORIENTATION: LEFT

## 2018-05-18 ASSESSMENT — PAIN DESCRIPTION - LOCATION: LOCATION: FOOT

## 2018-05-18 ASSESSMENT — PAIN DESCRIPTION - FREQUENCY: FREQUENCY: CONTINUOUS

## 2018-05-18 ASSESSMENT — PAIN SCALES - GENERAL: PAINLEVEL_OUTOF10: 7

## 2018-05-18 ASSESSMENT — PAIN DESCRIPTION - DESCRIPTORS: DESCRIPTORS: SHARP;SHOOTING

## 2018-05-18 ASSESSMENT — PAIN DESCRIPTION - ONSET: ONSET: ON-GOING

## 2018-05-18 ASSESSMENT — PAIN DESCRIPTION - PROGRESSION: CLINICAL_PROGRESSION: NOT CHANGED

## 2018-05-18 ASSESSMENT — PAIN DESCRIPTION - PAIN TYPE: TYPE: CHRONIC PAIN

## 2018-05-24 ENCOUNTER — HOSPITAL ENCOUNTER (OUTPATIENT)
Dept: GENERAL RADIOLOGY | Age: 43
Discharge: HOME | End: 2018-05-24
Attending: INTERNAL MEDICINE | Admitting: INTERNAL MEDICINE

## 2018-05-24 DIAGNOSIS — M86.9 DIABETIC FOOT ULCER WITH OSTEOMYELITIS (HCC): ICD-10-CM

## 2018-05-24 DIAGNOSIS — L97.422 ULCER OF LEFT HEEL, WITH FAT LAYER EXPOSED (HCC): ICD-10-CM

## 2018-05-24 DIAGNOSIS — L97.509 DIABETIC FOOT ULCER WITH OSTEOMYELITIS (HCC): ICD-10-CM

## 2018-05-24 DIAGNOSIS — E11.69 DIABETIC FOOT ULCER WITH OSTEOMYELITIS (HCC): ICD-10-CM

## 2018-05-24 DIAGNOSIS — E11.621 DIABETIC FOOT ULCER WITH OSTEOMYELITIS (HCC): ICD-10-CM

## 2018-05-25 ENCOUNTER — HOSPITAL ENCOUNTER (OUTPATIENT)
Dept: WOUND CARE | Age: 43
Discharge: HOME | End: 2018-05-25
Attending: INTERNAL MEDICINE | Admitting: INTERNAL MEDICINE

## 2018-05-25 VITALS
RESPIRATION RATE: 18 BRPM | DIASTOLIC BLOOD PRESSURE: 100 MMHG | TEMPERATURE: 97.7 F | SYSTOLIC BLOOD PRESSURE: 158 MMHG | HEART RATE: 93 BPM

## 2018-05-25 DIAGNOSIS — E11.621 DIABETIC ULCER OF LEFT MIDFOOT ASSOCIATED WITH TYPE 2 DIABETES MELLITUS, WITH FAT LAYER EXPOSED (HCC): ICD-10-CM

## 2018-05-25 DIAGNOSIS — L97.422 DIABETIC ULCER OF LEFT MIDFOOT ASSOCIATED WITH TYPE 2 DIABETES MELLITUS, WITH FAT LAYER EXPOSED (HCC): ICD-10-CM

## 2018-05-25 ASSESSMENT — PAIN DESCRIPTION - ORIENTATION: ORIENTATION: LEFT

## 2018-05-25 ASSESSMENT — PAIN DESCRIPTION - PAIN TYPE: TYPE: CHRONIC PAIN

## 2018-05-25 ASSESSMENT — PAIN DESCRIPTION - LOCATION: LOCATION: FOOT

## 2018-05-25 ASSESSMENT — PAIN DESCRIPTION - FREQUENCY: FREQUENCY: CONTINUOUS

## 2018-05-25 ASSESSMENT — PAIN DESCRIPTION - PROGRESSION: CLINICAL_PROGRESSION: NOT CHANGED

## 2018-05-25 ASSESSMENT — PAIN DESCRIPTION - DESCRIPTORS: DESCRIPTORS: SHARP;ACHING

## 2018-05-25 ASSESSMENT — PAIN DESCRIPTION - ONSET: ONSET: ON-GOING

## 2018-05-25 ASSESSMENT — PAIN SCALES - GENERAL: PAINLEVEL_OUTOF10: 6

## 2018-05-30 ENCOUNTER — HOSPITAL ENCOUNTER (OUTPATIENT)
Dept: WOUND CARE | Age: 43
Discharge: HOME | End: 2018-05-29
Attending: ORTHOPAEDIC SURGERY | Admitting: ORTHOPAEDIC SURGERY

## 2018-09-15 ENCOUNTER — HOSPITAL ENCOUNTER (INPATIENT)
Dept: MEDSURG UNIT | Age: 43
LOS: 8 days | Discharge: HOME HEALTH | DRG: 710 | End: 2018-09-23
Attending: EMERGENCY MEDICINE | Admitting: STUDENT IN AN ORGANIZED HEALTH CARE EDUCATION/TRAINING PROGRAM
Payer: COMMERCIAL

## 2018-09-15 DIAGNOSIS — L03.116 CELLULITIS OF LEFT FOOT: Primary | ICD-10-CM

## 2018-09-15 DIAGNOSIS — E13.621 DIABETIC ULCER OF LEFT MIDFOOT ASSOCIATED WITH DIABETES MELLITUS OF OTHER TYPE, UNSPECIFIED ULCER STAGE (HCC): ICD-10-CM

## 2018-09-15 DIAGNOSIS — L97.429 DIABETIC ULCER OF LEFT MIDFOOT ASSOCIATED WITH DIABETES MELLITUS OF OTHER TYPE, UNSPECIFIED ULCER STAGE (HCC): ICD-10-CM

## 2018-09-15 PROBLEM — A41.9 SEPSIS (HCC): Status: ACTIVE | Noted: 2018-09-15

## 2018-09-15 LAB
ALBUMIN SERPL-MCNC: 3.3 GM/DL (ref 3.4–5)
ALP BLD-CCNC: 80 IU/L (ref 40–129)
ALT SERPL-CCNC: 13 U/L (ref 10–40)
ANION GAP SERPL CALCULATED.3IONS-SCNC: 9 MMOL/L (ref 4–16)
APTT: 34.7 SECONDS (ref 21.2–33)
AST SERPL-CCNC: 15 IU/L (ref 15–37)
BACTERIA: ABNORMAL /HPF
BASOPHILS ABSOLUTE: 0 K/CU MM
BASOPHILS RELATIVE PERCENT: 0.2 % (ref 0–1)
BILIRUB SERPL-MCNC: 0.8 MG/DL (ref 0–1)
BILIRUBIN URINE: NEGATIVE MG/DL
BLOOD, URINE: ABNORMAL
BUN BLDV-MCNC: 18 MG/DL (ref 6–23)
CALCIUM SERPL-MCNC: 8.9 MG/DL (ref 8.3–10.6)
CHLORIDE BLD-SCNC: 97 MMOL/L (ref 99–110)
CLARITY: CLEAR
CO2: 24 MMOL/L (ref 21–32)
COLOR: YELLOW
CREAT SERPL-MCNC: 0.9 MG/DL (ref 0.9–1.3)
DIFFERENTIAL TYPE: ABNORMAL
EOSINOPHILS ABSOLUTE: 0 K/CU MM
EOSINOPHILS RELATIVE PERCENT: 0.2 % (ref 0–3)
GFR AFRICAN AMERICAN: >60 ML/MIN/1.73M2
GFR NON-AFRICAN AMERICAN: >60 ML/MIN/1.73M2
GLUCOSE BLD-MCNC: 297 MG/DL (ref 70–99)
GLUCOSE BLD-MCNC: 320 MG/DL (ref 70–99)
GLUCOSE BLD-MCNC: 371 MG/DL (ref 70–99)
GLUCOSE BLD-MCNC: 372 MG/DL (ref 70–99)
GLUCOSE, URINE: >500 MG/DL
HCT VFR BLD CALC: 35.4 % (ref 42–52)
HEMOGLOBIN: 12.1 GM/DL (ref 13.5–18)
IMMATURE NEUTROPHIL %: 0.6 % (ref 0–0.43)
INR BLD: 1.21 INDEX
KETONES, URINE: ABNORMAL MG/DL
LACTATE: 1.1 MMOL/L (ref 0.4–2)
LEUKOCYTE ESTERASE, URINE: NEGATIVE
LYMPHOCYTES ABSOLUTE: 0.8 K/CU MM
LYMPHOCYTES RELATIVE PERCENT: 4.8 % (ref 24–44)
MCH RBC QN AUTO: 30.5 PG (ref 27–31)
MCHC RBC AUTO-ENTMCNC: 34.2 % (ref 32–36)
MCV RBC AUTO: 89.2 FL (ref 78–100)
MONOCYTES ABSOLUTE: 1 K/CU MM
MONOCYTES RELATIVE PERCENT: 6.3 % (ref 0–4)
NITRITE URINE, QUANTITATIVE: NEGATIVE
NUCLEATED RBC %: 0 %
PDW BLD-RTO: 12 % (ref 11.7–14.9)
PH, URINE: 5 (ref 5–8)
PLATELET # BLD: 189 K/CU MM (ref 140–440)
PMV BLD AUTO: 10.8 FL (ref 7.5–11.1)
POTASSIUM SERPL-SCNC: 4.3 MMOL/L (ref 3.5–5.1)
PROTEIN UA: 100 MG/DL
PROTHROMBIN TIME: 13.8 SECONDS (ref 9.12–12.5)
RBC # BLD: 3.97 M/CU MM (ref 4.6–6.2)
RBC URINE: 1 /HPF (ref 0–3)
SEGMENTED NEUTROPHILS ABSOLUTE COUNT: 14.4 K/CU MM
SEGMENTED NEUTROPHILS RELATIVE PERCENT: 87.9 % (ref 36–66)
SODIUM BLD-SCNC: 130 MMOL/L (ref 135–145)
SPECIFIC GRAVITY UA: 1.02 (ref 1–1.03)
TOTAL IMMATURE NEUTOROPHIL: 0.1 K/CU MM
TOTAL NUCLEATED RBC: 0 K/CU MM
TOTAL PROTEIN: 6.7 GM/DL (ref 6.4–8.2)
TOTAL RETICULOCYTE COUNT: 0.12 K/CU MM
TRICHOMONAS: ABNORMAL /HPF
UROBILINOGEN, URINE: 2 MG/DL (ref 0.2–1)
WBC # BLD: 16.4 K/CU MM (ref 4–10.5)
WBC UA: 1 /HPF (ref 0–2)

## 2018-09-15 PROCEDURE — 85730 THROMBOPLASTIN TIME PARTIAL: CPT

## 2018-09-15 PROCEDURE — 71045 X-RAY EXAM CHEST 1 VIEW: CPT

## 2018-09-15 PROCEDURE — 85610 PROTHROMBIN TIME: CPT

## 2018-09-15 PROCEDURE — 81001 URINALYSIS AUTO W/SCOPE: CPT

## 2018-09-15 PROCEDURE — 80053 COMPREHEN METABOLIC PANEL: CPT

## 2018-09-15 PROCEDURE — 96375 TX/PRO/DX INJ NEW DRUG ADDON: CPT

## 2018-09-15 PROCEDURE — 82962 GLUCOSE BLOOD TEST: CPT

## 2018-09-15 PROCEDURE — 9990 CHARGE CONVERSION

## 2018-09-15 PROCEDURE — 73630 X-RAY EXAM OF FOOT: CPT

## 2018-09-15 PROCEDURE — 93971 EXTREMITY STUDY: CPT

## 2018-09-15 PROCEDURE — 85025 COMPLETE CBC W/AUTO DIFF WBC: CPT

## 2018-09-15 PROCEDURE — 96365 THER/PROPH/DIAG IV INF INIT: CPT

## 2018-09-15 PROCEDURE — 96367 TX/PROPH/DG ADDL SEQ IV INF: CPT

## 2018-09-15 PROCEDURE — 87077 CULTURE AEROBIC IDENTIFY: CPT

## 2018-09-15 PROCEDURE — 87071 CULTURE AEROBIC QUANT OTHER: CPT

## 2018-09-15 PROCEDURE — 87147 CULTURE TYPE IMMUNOLOGIC: CPT

## 2018-09-15 PROCEDURE — 87073 CULTURE BACTERIA ANAEROBIC: CPT

## 2018-09-15 PROCEDURE — 94761 N-INVAS EAR/PLS OXIMETRY MLT: CPT

## 2018-09-15 PROCEDURE — 87040 BLOOD CULTURE FOR BACTERIA: CPT

## 2018-09-15 PROCEDURE — 87186 SC STD MICRODIL/AGAR DIL: CPT

## 2018-09-15 PROCEDURE — 83605 ASSAY OF LACTIC ACID: CPT

## 2018-09-15 PROCEDURE — 99285 EMERGENCY DEPT VISIT HI MDM: CPT

## 2018-09-15 RX ORDER — SODIUM CHLORIDE 0.9 % (FLUSH) 0.9 %
10 SYRINGE (ML) INJECTION PRN
Status: DISCONTINUED | OUTPATIENT
Start: 2018-09-15 | End: 2018-09-23 | Stop reason: HOSPADM

## 2018-09-15 RX ORDER — 0.9 % SODIUM CHLORIDE 0.9 %
1000 INTRAVENOUS SOLUTION INTRAVENOUS ONCE
Status: COMPLETED | OUTPATIENT
Start: 2018-09-15 | End: 2018-09-15

## 2018-09-15 RX ORDER — INSULIN GLARGINE 100 [IU]/ML
60 INJECTION, SOLUTION SUBCUTANEOUS NIGHTLY
Status: DISCONTINUED | OUTPATIENT
Start: 2018-09-15 | End: 2018-09-16

## 2018-09-15 RX ORDER — DEXTROSE MONOHYDRATE 25 G/50ML
12.5 INJECTION, SOLUTION INTRAVENOUS PRN
Status: DISCONTINUED | OUTPATIENT
Start: 2018-09-15 | End: 2018-09-23 | Stop reason: HOSPADM

## 2018-09-15 RX ORDER — SODIUM CHLORIDE 0.9 % (FLUSH) 0.9 %
10 SYRINGE (ML) INJECTION EVERY 12 HOURS SCHEDULED
Status: DISCONTINUED | OUTPATIENT
Start: 2018-09-15 | End: 2018-09-23 | Stop reason: HOSPADM

## 2018-09-15 RX ORDER — OXYCODONE HYDROCHLORIDE AND ACETAMINOPHEN 5; 325 MG/1; MG/1
1 TABLET ORAL EVERY 8 HOURS PRN
Status: DISCONTINUED | OUTPATIENT
Start: 2018-09-15 | End: 2018-09-15

## 2018-09-15 RX ORDER — ONDANSETRON 2 MG/ML
4 INJECTION INTRAMUSCULAR; INTRAVENOUS EVERY 6 HOURS PRN
Status: DISCONTINUED | OUTPATIENT
Start: 2018-09-15 | End: 2018-09-23 | Stop reason: HOSPADM

## 2018-09-15 RX ORDER — DEXTROSE MONOHYDRATE 50 MG/ML
100 INJECTION, SOLUTION INTRAVENOUS PRN
Status: DISCONTINUED | OUTPATIENT
Start: 2018-09-15 | End: 2018-09-23 | Stop reason: HOSPADM

## 2018-09-15 RX ORDER — NICOTINE POLACRILEX 4 MG
15 LOZENGE BUCCAL PRN
Status: DISCONTINUED | OUTPATIENT
Start: 2018-09-15 | End: 2018-09-23 | Stop reason: HOSPADM

## 2018-09-15 RX ORDER — OXYCODONE HYDROCHLORIDE AND ACETAMINOPHEN 5; 325 MG/1; MG/1
1 TABLET ORAL EVERY 4 HOURS PRN
Status: DISCONTINUED | OUTPATIENT
Start: 2018-09-15 | End: 2018-09-16

## 2018-09-15 RX ORDER — TIZANIDINE 4 MG/1
4 TABLET ORAL EVERY 6 HOURS PRN
Status: DISCONTINUED | OUTPATIENT
Start: 2018-09-15 | End: 2018-09-23 | Stop reason: HOSPADM

## 2018-09-15 RX ORDER — ONDANSETRON 2 MG/ML
4 INJECTION INTRAMUSCULAR; INTRAVENOUS ONCE
Status: COMPLETED | OUTPATIENT
Start: 2018-09-15 | End: 2018-09-15

## 2018-09-15 RX ORDER — ONDANSETRON 2 MG/ML
INJECTION INTRAMUSCULAR; INTRAVENOUS
Status: COMPLETED
Start: 2018-09-15 | End: 2018-09-15

## 2018-09-15 RX ORDER — MORPHINE SULFATE 4 MG/ML
4 INJECTION, SOLUTION INTRAMUSCULAR; INTRAVENOUS EVERY 30 MIN PRN
Status: DISCONTINUED | OUTPATIENT
Start: 2018-09-15 | End: 2018-09-16 | Stop reason: ALTCHOICE

## 2018-09-15 RX ADMIN — ONDANSETRON 4 MG: 2 INJECTION INTRAMUSCULAR; INTRAVENOUS at 15:03

## 2018-09-15 RX ADMIN — Medication 4 MG: at 16:05

## 2018-09-15 RX ADMIN — Medication 1000 ML: at 10:26

## 2018-09-15 RX ADMIN — MORPHINE SULFATE 4 MG: 4 INJECTION, SOLUTION INTRAMUSCULAR; INTRAVENOUS at 10:26

## 2018-09-15 RX ADMIN — INSULIN GLARGINE 60 UNITS: 100 INJECTION, SOLUTION SUBCUTANEOUS at 22:17

## 2018-09-15 RX ADMIN — ONDANSETRON 4 MG: 2 INJECTION INTRAMUSCULAR; INTRAVENOUS at 10:26

## 2018-09-15 RX ADMIN — Medication 4 MG: at 21:34

## 2018-09-15 RX ADMIN — MORPHINE SULFATE 4 MG: 4 INJECTION, SOLUTION INTRAMUSCULAR; INTRAVENOUS at 12:38

## 2018-09-15 RX ADMIN — OXYCODONE HYDROCHLORIDE AND ACETAMINOPHEN 1 TABLET: 5; 325 TABLET ORAL at 16:05

## 2018-09-15 RX ADMIN — OXYCODONE HYDROCHLORIDE AND ACETAMINOPHEN 1 TABLET: 5; 325 TABLET ORAL at 22:17

## 2018-09-15 RX ADMIN — SODIUM CHLORIDE, PRESERVATIVE FREE 10 ML: 5 INJECTION INTRAVENOUS at 22:13

## 2018-09-15 ASSESSMENT — PAIN SCALES - GENERAL
PAINLEVEL_OUTOF10: 7
PAINLEVEL_OUTOF10: 8
PAINLEVEL_OUTOF10: 8
PAINLEVEL_OUTOF10: 6
PAINLEVEL_OUTOF10: 10
PAINLEVEL_OUTOF10: 8

## 2018-09-15 ASSESSMENT — PAIN DESCRIPTION - PROGRESSION
CLINICAL_PROGRESSION: NOT CHANGED
CLINICAL_PROGRESSION: NOT CHANGED

## 2018-09-15 ASSESSMENT — PAIN DESCRIPTION - PAIN TYPE
TYPE: ACUTE PAIN

## 2018-09-15 ASSESSMENT — PAIN DESCRIPTION - FREQUENCY
FREQUENCY: CONTINUOUS
FREQUENCY: CONTINUOUS

## 2018-09-15 ASSESSMENT — PAIN DESCRIPTION - LOCATION
LOCATION: FOOT
LOCATION: FOOT;LEG
LOCATION: FOOT

## 2018-09-15 ASSESSMENT — PAIN DESCRIPTION - DESCRIPTORS
DESCRIPTORS: ACHING;CONSTANT
DESCRIPTORS: ACHING;CONSTANT

## 2018-09-15 ASSESSMENT — PAIN DESCRIPTION - ONSET
ONSET: ON-GOING
ONSET: ON-GOING

## 2018-09-15 ASSESSMENT — PAIN DESCRIPTION - ORIENTATION
ORIENTATION: LEFT

## 2018-09-15 NOTE — H&P
Pulse:    Resp:    Temp:    SpO2:      Physical Exam:    GEN Awake male, laying in bed in no apparent distress. Appears given age. EYES Pupils are equally round. No scleral discharge  HENT Atraumatic and symmetric head  NECK No apparent thyromegaly  RESP Symmetric chest movement while on room air. CARDIO/VASC Peripheral pulses equal bilaterally and palpable. No peripheral edema. GI Abdomen is not distended. Rectal exam deferred.  Luna catheter is not present. HEME/LYMPH No petechiae or ecchymoses. MSK Spontaneous movement of BL upper extremities  SKIN Normal coloration, warm, dry. +erythematous left calf with ulcer under left foot. +thick fungal looking toe nails. NEURO Cranial nerves appear grossly intact  PSYCH Awake, alert. Oriented x4. Past Medical History:      Past Medical History:   Diagnosis Date    Abscess 2010    scrotal    Anxiety associated with depression     Back pain 7/2/2012    Chest pain 5/1/2013    Diabetic nephropathy ([address]     Diabetic neuropathy ([address] 12/22/2011    Diabetic ulcer of left midfoot associated with type 2 diabetes mellitus, with fat layer exposed ([address] 7/18/2017    Diverticulosis     C scope + Dr. Alisa Richard DM (diabetes mellitus), type 2 ([address] 2002    DR. Turner podiatry    Hyperlipidemia LDL goal < 100 7/15/2013    Hypertension     Internal hemorrhoid     C scope + Dr. Anastasia Boss fracture 1988    Panic attacks     Pericarditis 2003    Hospitalized with s/p heart cath normal    Peripheral autonomic neuropathy due to diabetes mellitus ([address]     Axonal EMG- [facility], March 2011    Pneumonia 1990    Sebaceous cyst 9/1/2011    URI (upper respiratory infection) 2/27/2012    WD-Wound, surgical, infected, initial encounter 12/8/2017    Wrist fracture 1986     PSHX:  has a past surgical history that includes Cardiac catheterization (2003, 2013); Tonsillectomy and adenoidectomy (1988); Upper gastrointestinal endoscopy (06/2/2011);  Colonoscopy (6/2/2011); Nose surgery (1993); skin biopsy (N/A, [id number]); other surgical history (Right, 05/22/2015); Toe amputation; Abdomen surgery; and other surgical history (12/04/2017). Allergies: Allergies   Allergen Reactions    Adhesive Tape Rash    Doxycycline Nausea And Vomiting       FAM HX: family history includes ADHD in his daughter; Bleeding Prob in his mother; Cancer in his mother; Diabetes in his father and sister; Heart Disease in his father; High Blood Pressure in his father, sister, and another family member; Kidney Disease in his father; Mental Illness in his sister and another family member; Obesity in his father and sister; Other in his son; Stroke in his mother.   Soc HX:   Social History     Social History    Marital status:      Spouse name: N/A    Number of children: N/A    Years of education: N/A     Social History Main Topics    Smoking status: Former Smoker     Years: 20.00     Quit date: 11/18/2017    Smokeless tobacco: Never Used    Alcohol use No      Comment: 4 cans soda daily, 2 cups tea daily/alchohol- average one drink per month    Drug use: Yes     Types: Marijuana      Comment: occasionally marijuana-last used 2 weeks ago- average 1-2 times per month    Sexual activity: Yes     Other Topics Concern    None     Social History Narrative    None       Medications:   Medications:    vancomycin  2,000 mg Intravenous Once      Infusions:   PRN Meds:   morphine 4 mg Q30 Min PRN         Electronically signed by Maggie Nunez DO on 9/15/2018 at 1:07 PM

## 2018-09-15 NOTE — PROGRESS NOTES
Pt adm for dm left foot wound. Pt reported that he has had wound x1 year and has recently quit going to wound clinic for care due to the fact he works too much has no time. Pt wbc increased wound and blood cultures done in ED, U/S neg for DVT,x-ray neg for osteomyelitis vanco started in ED. Pt independent pain contril with percocet. No wound care orders at this time.

## 2018-09-15 NOTE — ED PROVIDER NOTES
Soft, No tenderness   Musculoskeletal:  Obvious erythema and swelling to the dorsal aspect of the left foot, there is skin breakdown and a stage II3 plantar diabetic foot ulcer on the left foot. Keratotic skin surrounding the ulcer. There is bruising and necrotic developing tissue to the fourth left toe area. There is exquisitely tender. There were several erythematous areas of the left upper extremity: There is tenderness into the calf and into the inguinal femoral triangle  Integument:  Evidence of cyanosis to the left fourth toe, indurated macular erythema to the dorsal aspect of the foot radiating up into the left calf area. Evidence of a stage II3 diabetic foot ulcer on the plantar aspect of the left foot  Vascular: Dorsalis pedis pulses 2+ equal bilaterally  Neurologic:  Alert & oriented, no slurred speech.        LABS:  Results for orders placed or performed during the hospital encounter of 09/15/18   CBC auto diff   Result Value Ref Range    WBC 16.4 (H) 4.0 - 10.5 K/CU MM    RBC 3.97 (L) 4.6 - 6.2 M/CU MM    Hemoglobin 12.1 (L) 13.5 - 18.0 GM/DL    Hematocrit 35.4 (L) 42 - 52 %    MCV 89.2 78 - 100 FL    MCH 30.5 27 - 31 PG    MCHC 34.2 32.0 - 36.0 %    RDW 12.0 11.7 - 14.9 %    Platelets 646 556 - 334 K/CU MM    MPV 10.8 7.5 - 11.1 FL    Differential Type AUTOMATED DIFFERENTIAL     Segs Relative 87.9 (H) 36 - 66 %    Lymphocytes % 4.8 (L) 24 - 44 %    Monocytes % 6.3 (H) 0 - 4 %    Eosinophils % 0.2 0 - 3 %    Basophils % 0.2 0 - 1 %    Segs Absolute 14.4 K/CU MM    Lymphocytes # 0.8 K/CU MM    Monocytes # 1.0 K/CU MM    Eosinophils # 0.0 K/CU MM    Basophils # 0.0 K/CU MM    Nucleated RBC % 0.0 %    Total Nucleated RBC 0.0 K/CU MM    TRC 0.1175 K/CU MM    Total Immature Neutrophil 0.10 K/CU MM    Immature Neutrophil % 0.6 (H) 0 - 0.43 %   CMP   Result Value Ref Range    Sodium 130 (L) 135 - 145 MMOL/L    Potassium 4.3 3.5 - 5.1 MMOL/L    Chloride 97 (L) 99 - 110 mMol/L    CO2 24 21 - 32 MMOL/L BUN 18 6 - 23 MG/DL    CREATININE 0.9 0.9 - 1.3 MG/DL    Glucose 371 (H) 70 - 99 MG/DL    Calcium 8.9 8.3 - 10.6 MG/DL    Alb 3.3 (L) 3.4 - 5.0 GM/DL    Total Protein 6.7 6.4 - 8.2 GM/DL    Total Bilirubin 0.8 0.0 - 1.0 MG/DL    ALT 13 10 - 40 U/L    AST 15 15 - 37 IU/L    Alkaline Phosphatase 80 40 - 129 IU/L    GFR Non-African American >60 >60 mL/min/1.73m2    GFR African American >60 >60 mL/min/1.73m2    Anion Gap 9 4 - 16   Lactic Acid, Plasma   Result Value Ref Range    Lactate 1.1 0.4 - 2.0 mMOL/L   PT - INR   Result Value Ref Range    Protime 13.8 (H) 9.12 - 12.5 SECONDS    INR 1.21 INDEX   PTT   Result Value Ref Range    aPTT 34.7 (H) 21.2 - 33.0 SECONDS         IMAGING:  Xr Foot Left (min 3 Views)    Result Date: 9/15/2018  EXAMINATION: 3 XRAY VIEWS OF THE LEFT FOOT 9/15/2018 11:23 am COMPARISON: 05/24/2018 HISTORY: Chronic left foot wound. FINDINGS: No evidence of fracture or dislocation. Mild soft tissue swelling along the dorsum. No erosive changes seen. Mild Achilles and plantar enthesopathy. No radiographic evidence for osteomyelitis. Nonspecific mild soft tissue swelling. Xr Chest Portable    Result Date: 9/15/2018  EXAMINATION: SINGLE XRAY VIEW OF THE CHEST 9/15/2018 11:23 am COMPARISON: 08/21/2015 HISTORY: ORDERING SYSTEM PROVIDED HISTORY: ro acute cardiopulmonary process TECHNOLOGIST PROVIDED HISTORY: Ordering Physician Provided Reason for Exam: left foot infection Acuity: Acute Type of Encounter: Initial FINDINGS: The lungs are without acute focal process. There is no effusion or pneumothorax. The cardiomediastinal silhouette is without acute process. The osseous structures are without acute process. No acute process. Vl Dup Lower Extremity Venous Left    Result Date: 9/15/2018  EXAMINATION: DUPLEX VENOUS ULTRASOUND OF THE LEFT LOWER EXTREMITY 9/15/2018 11:18 am TECHNIQUE: Duplex ultrasound and Doppler images were obtained of the left lower extremity. COMPARISON: None.

## 2018-09-15 NOTE — ED NOTES
Pt resting quietly in room, resps easy and even, no s/sx of distress at this time, pt denies needs at this time. Call light with in reach.      Gema Patrick RN  09/15/18 [id number]

## 2018-09-15 NOTE — ED PROVIDER NOTES
I independently examined and evaluated Curtis Luna. In brief their history revealed left fourth toe is discolored and purple. Has full certain to the bottom of his mid foot. States pain and redness all the way up to his thigh. States been present for several days. States he's had a partial amputation on his right foot with Dr. Aleksandr Mendoza in the past.  Denies any fevers or chills, nausea or vomiting. States he has not been to wound care for followed up with a podiatrist.  Denies any current antibiotics. .  All diagnostic, treatment, and disposition decisions were made by myself in conjunction with the mid-level provider. Before disposition, questions were sought and answered with the patient. They have voiced understanding and agree with the plan: Patient is normotensive. Afebrile. Does have an elevated white count of 16.4. Electrolytes do reveal glucose of 371 with no elevation in anion gap, normal CO2. Mild hyponatremia 1:30. [facility] INR is normal at 1.21. Lactic acid is within normal limits at 1.1. Patient has been started on IV vancomycin and Zosyn. DVT study and left lower extremity is negative. Left foot x-ray does not show any radial graphic evidence of osteomyelitis at this time there is mild soft tissue swelling. Patient will need to be admitted to the hospital for further evaluation and treatment and IV antibiotics. For all further details of the patient's emergency department visit, please see the mid-level practitioner's documentation.         Jhony George MD  09/15/18 [id number]

## 2018-09-16 LAB
ANION GAP SERPL CALCULATED.3IONS-SCNC: 10 MMOL/L (ref 4–16)
BASOPHILS ABSOLUTE: 0 K/CU MM
BASOPHILS RELATIVE PERCENT: 0.2 % (ref 0–1)
BUN BLDV-MCNC: 19 MG/DL (ref 6–23)
CALCIUM SERPL-MCNC: 8.3 MG/DL (ref 8.3–10.6)
CHLORIDE BLD-SCNC: 100 MMOL/L (ref 99–110)
CO2: 24 MMOL/L (ref 21–32)
CREAT SERPL-MCNC: 1.1 MG/DL (ref 0.9–1.3)
DIFFERENTIAL TYPE: ABNORMAL
EOSINOPHILS ABSOLUTE: 0.2 K/CU MM
EOSINOPHILS RELATIVE PERCENT: 1.1 % (ref 0–3)
ESTIMATED AVERAGE GLUCOSE: 249 MG/DL
GFR AFRICAN AMERICAN: >60 ML/MIN/1.73M2
GFR NON-AFRICAN AMERICAN: >60 ML/MIN/1.73M2
GLUCOSE BLD-MCNC: 185 MG/DL (ref 70–99)
GLUCOSE BLD-MCNC: 223 MG/DL (ref 70–99)
GLUCOSE BLD-MCNC: 224 MG/DL (ref 70–99)
GLUCOSE BLD-MCNC: 252 MG/DL (ref 70–99)
GLUCOSE BLD-MCNC: 270 MG/DL (ref 70–99)
GLUCOSE BLD-MCNC: 271 MG/DL (ref 70–99)
HBA1C MFR BLD: 10.3 % (ref 4.2–6.3)
HCT VFR BLD CALC: 32.1 % (ref 42–52)
HEMOGLOBIN: 10.6 GM/DL (ref 13.5–18)
IMMATURE NEUTROPHIL %: 0.4 % (ref 0–0.43)
LYMPHOCYTES ABSOLUTE: 1.1 K/CU MM
LYMPHOCYTES RELATIVE PERCENT: 7.6 % (ref 24–44)
MCH RBC QN AUTO: 30 PG (ref 27–31)
MCHC RBC AUTO-ENTMCNC: 33 % (ref 32–36)
MCV RBC AUTO: 90.9 FL (ref 78–100)
MONOCYTES ABSOLUTE: 0.9 K/CU MM
MONOCYTES RELATIVE PERCENT: 6.6 % (ref 0–4)
NUCLEATED RBC %: 0 %
PDW BLD-RTO: 11.9 % (ref 11.7–14.9)
PLATELET # BLD: 172 K/CU MM (ref 140–440)
PMV BLD AUTO: 10.6 FL (ref 7.5–11.1)
POTASSIUM SERPL-SCNC: 4 MMOL/L (ref 3.5–5.1)
RBC # BLD: 3.53 M/CU MM (ref 4.6–6.2)
SEGMENTED NEUTROPHILS ABSOLUTE COUNT: 11.8 K/CU MM
SEGMENTED NEUTROPHILS RELATIVE PERCENT: 84.1 % (ref 36–66)
SODIUM BLD-SCNC: 134 MMOL/L (ref 135–145)
TOTAL IMMATURE NEUTOROPHIL: 0.06 K/CU MM
TOTAL NUCLEATED RBC: 0 K/CU MM
WBC # BLD: 14 K/CU MM (ref 4–10.5)

## 2018-09-16 PROCEDURE — 80048 BASIC METABOLIC PNL TOTAL CA: CPT

## 2018-09-16 PROCEDURE — 83036 HEMOGLOBIN GLYCOSYLATED A1C: CPT

## 2018-09-16 PROCEDURE — 93925 LOWER EXTREMITY STUDY: CPT

## 2018-09-16 PROCEDURE — 9990 CHARGE CONVERSION

## 2018-09-16 PROCEDURE — 85025 COMPLETE CBC W/AUTO DIFF WBC: CPT

## 2018-09-16 PROCEDURE — 87071 CULTURE AEROBIC QUANT OTHER: CPT

## 2018-09-16 PROCEDURE — 87073 CULTURE BACTERIA ANAEROBIC: CPT

## 2018-09-16 PROCEDURE — 82962 GLUCOSE BLOOD TEST: CPT

## 2018-09-16 PROCEDURE — 36415 COLL VENOUS BLD VENIPUNCTURE: CPT

## 2018-09-16 RX ORDER — OXYCODONE AND ACETAMINOPHEN 7.5; 325 MG/1; MG/1
1 TABLET ORAL EVERY 6 HOURS PRN
Status: ON HOLD | COMMUNITY
End: 2018-10-08 | Stop reason: HOSPADM

## 2018-09-16 RX ORDER — OXYCODONE AND ACETAMINOPHEN 7.5; 325 MG/1; MG/1
1 TABLET ORAL EVERY 6 HOURS PRN
Status: DISCONTINUED | OUTPATIENT
Start: 2018-09-16 | End: 2018-09-23 | Stop reason: HOSPADM

## 2018-09-16 RX ORDER — MORPHINE SULFATE 4 MG/ML
4 INJECTION, SOLUTION INTRAMUSCULAR; INTRAVENOUS EVERY 4 HOURS PRN
Status: DISCONTINUED | OUTPATIENT
Start: 2018-09-16 | End: 2018-09-23 | Stop reason: HOSPADM

## 2018-09-16 RX ORDER — INSULIN GLARGINE 100 [IU]/ML
70 INJECTION, SOLUTION SUBCUTANEOUS NIGHTLY
Status: DISCONTINUED | OUTPATIENT
Start: 2018-09-16 | End: 2018-09-23 | Stop reason: HOSPADM

## 2018-09-16 RX ADMIN — SODIUM CHLORIDE, PRESERVATIVE FREE 10 ML: 5 INJECTION INTRAVENOUS at 22:19

## 2018-09-16 RX ADMIN — MORPHINE SULFATE 4 MG: 4 INJECTION INTRAVENOUS at 17:05

## 2018-09-16 RX ADMIN — INSULIN GLARGINE 70 UNITS: 100 INJECTION, SOLUTION SUBCUTANEOUS at 22:17

## 2018-09-16 RX ADMIN — OXYCODONE HYDROCHLORIDE AND ACETAMINOPHEN 1 TABLET: 5; 325 TABLET ORAL at 03:37

## 2018-09-16 RX ADMIN — Medication 4 MG: at 09:38

## 2018-09-16 RX ADMIN — MORPHINE SULFATE 4 MG: 4 INJECTION INTRAVENOUS at 22:16

## 2018-09-16 RX ADMIN — OXYCODONE HYDROCHLORIDE AND ACETAMINOPHEN 1 TABLET: 7.5; 325 TABLET ORAL at 20:38

## 2018-09-16 RX ADMIN — LINAGLIPTIN 5 MG: 5 TABLET, FILM COATED ORAL at 09:37

## 2018-09-16 RX ADMIN — Medication 4 MG: at 17:05

## 2018-09-16 RX ADMIN — OXYCODONE HYDROCHLORIDE AND ACETAMINOPHEN 1 TABLET: 5; 325 TABLET ORAL at 09:37

## 2018-09-16 ASSESSMENT — PAIN SCALES - GENERAL
PAINLEVEL_OUTOF10: 0
PAINLEVEL_OUTOF10: 8
PAINLEVEL_OUTOF10: 7
PAINLEVEL_OUTOF10: 9
PAINLEVEL_OUTOF10: 5
PAINLEVEL_OUTOF10: 6
PAINLEVEL_OUTOF10: 10
PAINLEVEL_OUTOF10: 10

## 2018-09-16 ASSESSMENT — PAIN DESCRIPTION - PAIN TYPE
TYPE: ACUTE PAIN

## 2018-09-16 ASSESSMENT — PAIN DESCRIPTION - ONSET
ONSET: ON-GOING

## 2018-09-16 ASSESSMENT — PAIN DESCRIPTION - LOCATION
LOCATION: FOOT

## 2018-09-16 ASSESSMENT — PAIN DESCRIPTION - FREQUENCY
FREQUENCY: CONTINUOUS

## 2018-09-16 ASSESSMENT — PAIN DESCRIPTION - DESCRIPTORS
DESCRIPTORS: ACHING;BURNING

## 2018-09-16 ASSESSMENT — PAIN DESCRIPTION - PROGRESSION
CLINICAL_PROGRESSION: NOT CHANGED

## 2018-09-16 ASSESSMENT — PAIN DESCRIPTION - ORIENTATION
ORIENTATION: LEFT

## 2018-09-16 NOTE — CONSULTS
toe    TONSILLECTOMY AND ADENOIDECTOMY  1988    UPPER GASTROINTESTINAL ENDOSCOPY  06/2/2011    Mild gastritis with moderatley severe antritis, small hiatal hernia, Dr. Burr Drivers       Allergies:  Adhesive tape and Doxycycline    Family History:   Family History   Problem Relation Age of Onset   Royetta Perches Cancer Mother         ?site   Royetta Perches Stroke Mother     Bleeding Prob Mother     Diabetes Father     Heart Disease Father     High Blood Pressure Father     Obesity Father     Kidney Disease Father     Diabetes Sister     High Blood Pressure Sister     Mental Illness Sister     Obesity Sister     High Blood Pressure Other     Mental Illness Other         bipolar    Other Son         cyst in ear canal    ADHD Daughter      REVIEW OF SYSTEMS:  Review of System Done as noted above     PHYSICAL EXAM:      Vitals:    /70   Pulse 78   Temp 98.2 °F (36.8 °C) (Oral)   Resp 14   Ht 5' 10\" (1.778 m)   Wt 265 lb (120.2 kg)   SpO2 99%   BMI 38.02 kg/m²     CONSTITUTIONAL:  awake, alert, cooperative, appears stated age   EYES:  vision intact Fundoscopic Exam not performed   ENT:Normal  NECK:  Supple, No JVD. Thyroid Exam:Normal   LUNGS:  Has Vesicular Breath Sounds,   CARDIOVASCULAR:  Normal apical impulse, regular rate and rhythm, normal S1 and S2, no S3 or S4, and has no  murmur   ABDOMEN:  No scars, normal bowel sounds, soft, non-distended, non-tender, no masses palpated, no hepatolienomegaly  Musculoskeletal: Normal  Extremities: Normal, peripheral pulses normal, , has no edema  Right Big Toe amputation // Left Foot infection  Plantar aspect  Extensive   NEUROLOGIC:  Awake, alert, oriented to name, place and time. Cranial nerves II-XII are grossly intact. Motor is  intact. Sensory neuropathy.  ,  and gait is abnormal.  Unstable    DATA:    CBC:   Recent Labs      09/15/18   1017  09/16/18   0521   WBC  16.4*  14.0*   HGB  12.1*  10.6*   PLT  189  172    CMP:  Recent Labs      09/15/18   1017  09/16/18   0521 NA  130*  134*   K  4.3  4.0   CL  97*  100   CO2  24  24   BUN  18  19   CREATININE  0.9  1.1   CALCIUM  8.9  8.3   PROT  6.7   --    LABALBU  3.3*   --    BILITOT  0.8   --    ALKPHOS  80   --    AST  15   --    ALT  13   --      Lipids:   Lab Results   Component Value Date    CHOL 173 07/24/2017    CHOL 168 10/15/2013    HDL 35 07/24/2017    TRIG 231 07/24/2017     Glucose:   Recent Labs      09/15/18   1444  09/15/18   1742  09/15/18   2125   POCGLU  297*  320*  372*     Hemoglobin A1C:   Lab Results   Component Value Date    LABA1C 10.3 09/16/2018     Free T4:   Lab Results   Component Value Date    T4FREE 1.25 07/24/2017     Free T3: No results found for: FT3  TSH High Sensitivity:   Lab Results   Component Value Date    TSHHS 2.300 07/24/2017       Xr Foot Left (min 3 Views)    Result Date: 9/15/2018  EXAMINATION: 3 XRAY VIEWS OF THE LEFT FOOT 9/15/2018 11:23 am COMPARISON: 05/24/2018 HISTORY: Chronic left foot wound. FINDINGS: No evidence of fracture or dislocation. Mild soft tissue swelling along the dorsum. No erosive changes seen. Mild Achilles and plantar enthesopathy. No radiographic evidence for osteomyelitis. Nonspecific mild soft tissue swelling. Xr Chest Portable    Result Date: 9/15/2018  EXAMINATION: SINGLE XRAY VIEW OF THE CHEST 9/15/2018 11:23 am COMPARISON: 08/21/2015 HISTORY: ORDERING SYSTEM PROVIDED HISTORY: ro acute cardiopulmonary process TECHNOLOGIST PROVIDED HISTORY: Ordering Physician Provided Reason for Exam: left foot infection Acuity: Acute Type of Encounter: Initial FINDINGS: The lungs are without acute focal process. There is no effusion or pneumothorax. The cardiomediastinal silhouette is without acute process. The osseous structures are without acute process. No acute process.      Vl Dup Lower Extremity Venous Left    Result Date: 9/15/2018  EXAMINATION: DUPLEX VENOUS ULTRASOUND OF THE LEFT LOWER EXTREMITY 9/15/2018 11:18 am TECHNIQUE: Duplex ultrasound and Doppler images were obtained of the left lower extremity. COMPARISON: None. HISTORY: ORDERING SYSTEM PROVIDED HISTORY: left foot and leg panio, ro dvt TECHNOLOGIST PROVIDED HISTORY: Ordering Physician Provided Reason for Exam: left foot and leg pain Acuity: Unknown Type of Encounter: Initial FINDINGS: The visualized veins of the left lower extremity are patent and free of echogenic thrombus. The veins are normally compressible and have normal phasic flow. There are multiple prominent appearing inguinal lymph nodes. No evidence of DVT in the left lower extremity. Scheduled Medicines   Medications:    insulin lispro  30 Units Subcutaneous TID WC    insulin glargine  70 Units Subcutaneous Nightly    insulin lispro  0-12 Units Subcutaneous TID WC    linagliptin  5 mg Oral Daily    insulin lispro  0-12 Units Subcutaneous 2 times per day    sodium chloride flush  10 mL Intravenous 2 times per day    enoxaparin  40 mg Subcutaneous Daily    vancomycin  1,250 mg Intravenous Q12H    influenza virus vaccine  0.5 mL Intramuscular Once      Infusions:    dextrose           IMPRESSION  Patient Active Problem List   Diagnosis    Hiatal hernia    Diabetes mellitus type 2, improved controlled    Diabetic neuropathy (HCC)    Panic attack    Anxiety associated with depression    DANIELA (obstructive sleep apnea)    Bilateral carpal tunnel syndrome- left worse than right    Hyperlipidemia with target LDL less than 100    HTN, g    Osteomyelitis ([address]    WD-Diabetic ulcer of left midfoot associated with type 2 diabetes mellitus, with fat layer exposed ([address]    Sepsis ([address]         RECOMMENDATIONS:      1. Reviewed POC blood glucose . Labs and X ray results   2. Reviewed Home and Current Medicines   3. Will Start On meal/ Correction bolus Humalog/ Lantus Insulin regime   4. Monitor Blood glucose frequently   5.  Modify  the dose of Insulin  as needed        Will follow with you  Again thank you for sharing pt's care with me.      Truly yours,       Sara Martinez MD

## 2018-09-17 LAB
ANION GAP SERPL CALCULATED.3IONS-SCNC: 13 MMOL/L (ref 4–16)
BASOPHILS ABSOLUTE: 0 K/CU MM
BASOPHILS RELATIVE PERCENT: 0.3 % (ref 0–1)
BUN BLDV-MCNC: 15 MG/DL (ref 6–23)
CALCIUM SERPL-MCNC: 8.3 MG/DL (ref 8.3–10.6)
CHLORIDE BLD-SCNC: 98 MMOL/L (ref 99–110)
CO2: 24 MMOL/L (ref 21–32)
CREAT SERPL-MCNC: 1.1 MG/DL (ref 0.9–1.3)
DIFFERENTIAL TYPE: ABNORMAL
EOSINOPHILS ABSOLUTE: 0.1 K/CU MM
EOSINOPHILS RELATIVE PERCENT: 1 % (ref 0–3)
GFR AFRICAN AMERICAN: >60 ML/MIN/1.73M2
GFR NON-AFRICAN AMERICAN: >60 ML/MIN/1.73M2
GLUCOSE BLD-MCNC: 110 MG/DL (ref 70–99)
GLUCOSE BLD-MCNC: 193 MG/DL (ref 70–99)
GLUCOSE BLD-MCNC: 199 MG/DL (ref 70–99)
GLUCOSE BLD-MCNC: 204 MG/DL (ref 70–99)
GLUCOSE BLD-MCNC: 208 MG/DL (ref 70–99)
GLUCOSE BLD-MCNC: 219 MG/DL (ref 70–99)
HCT VFR BLD CALC: 35.6 % (ref 42–52)
HEMOGLOBIN: 11.7 GM/DL (ref 13.5–18)
IMMATURE NEUTROPHIL %: 0.6 % (ref 0–0.43)
LYMPHOCYTES ABSOLUTE: 0.8 K/CU MM
LYMPHOCYTES RELATIVE PERCENT: 6.2 % (ref 24–44)
MCH RBC QN AUTO: 29.6 PG (ref 27–31)
MCHC RBC AUTO-ENTMCNC: 32.9 % (ref 32–36)
MCV RBC AUTO: 90.1 FL (ref 78–100)
MONOCYTES ABSOLUTE: 1 K/CU MM
MONOCYTES RELATIVE PERCENT: 8.2 % (ref 0–4)
NUCLEATED RBC %: 0 %
PDW BLD-RTO: 11.9 % (ref 11.7–14.9)
PLATELET # BLD: 232 K/CU MM (ref 140–440)
PMV BLD AUTO: 10.6 FL (ref 7.5–11.1)
POTASSIUM SERPL-SCNC: 3.9 MMOL/L (ref 3.5–5.1)
RBC # BLD: 3.95 M/CU MM (ref 4.6–6.2)
SEGMENTED NEUTROPHILS ABSOLUTE COUNT: 10.6 K/CU MM
SEGMENTED NEUTROPHILS RELATIVE PERCENT: 83.7 % (ref 36–66)
SODIUM BLD-SCNC: 135 MMOL/L (ref 135–145)
TOTAL IMMATURE NEUTOROPHIL: 0.07 K/CU MM
TOTAL NUCLEATED RBC: 0 K/CU MM
VANCOMYCIN TROUGH: 7.6 UG/ML (ref 10–20)
WBC # BLD: 12.7 K/CU MM (ref 4–10.5)

## 2018-09-17 PROCEDURE — 80048 BASIC METABOLIC PNL TOTAL CA: CPT

## 2018-09-17 PROCEDURE — 73718 MRI LOWER EXTREMITY W/O DYE: CPT

## 2018-09-17 PROCEDURE — 36415 COLL VENOUS BLD VENIPUNCTURE: CPT

## 2018-09-17 PROCEDURE — 93005 ELECTROCARDIOGRAM TRACING: CPT

## 2018-09-17 PROCEDURE — 99211 OFF/OP EST MAY X REQ PHY/QHP: CPT

## 2018-09-17 PROCEDURE — 82962 GLUCOSE BLOOD TEST: CPT

## 2018-09-17 PROCEDURE — 85025 COMPLETE CBC W/AUTO DIFF WBC: CPT

## 2018-09-17 PROCEDURE — 80202 ASSAY OF VANCOMYCIN: CPT

## 2018-09-17 PROCEDURE — 9990 CHARGE CONVERSION

## 2018-09-17 RX ORDER — SODIUM CHLORIDE 9 MG/ML
INJECTION, SOLUTION INTRAVENOUS CONTINUOUS
Status: DISCONTINUED | OUTPATIENT
Start: 2018-09-17 | End: 2018-09-20

## 2018-09-17 RX ADMIN — SODIUM CHLORIDE: 9 INJECTION, SOLUTION INTRAVENOUS at 07:00

## 2018-09-17 RX ADMIN — OXYCODONE HYDROCHLORIDE AND ACETAMINOPHEN 1 TABLET: 7.5; 325 TABLET ORAL at 23:32

## 2018-09-17 RX ADMIN — OXYCODONE HYDROCHLORIDE AND ACETAMINOPHEN 1 TABLET: 7.5; 325 TABLET ORAL at 08:52

## 2018-09-17 RX ADMIN — Medication 4 MG: at 21:06

## 2018-09-17 RX ADMIN — Medication 10 ML: at 14:55

## 2018-09-17 RX ADMIN — MORPHINE SULFATE 4 MG: 4 INJECTION INTRAVENOUS at 02:59

## 2018-09-17 RX ADMIN — MORPHINE SULFATE 4 MG: 4 INJECTION INTRAVENOUS at 07:04

## 2018-09-17 RX ADMIN — SODIUM CHLORIDE, PRESERVATIVE FREE 10 ML: 5 INJECTION INTRAVENOUS at 11:42

## 2018-09-17 RX ADMIN — INSULIN GLARGINE 70 UNITS: 100 INJECTION, SOLUTION SUBCUTANEOUS at 21:13

## 2018-09-17 RX ADMIN — Medication 4 MG: at 14:55

## 2018-09-17 RX ADMIN — MORPHINE SULFATE 4 MG: 4 INJECTION INTRAVENOUS at 21:11

## 2018-09-17 RX ADMIN — Medication 4 MG: at 02:59

## 2018-09-17 RX ADMIN — LINAGLIPTIN 5 MG: 5 TABLET, FILM COATED ORAL at 08:52

## 2018-09-17 RX ADMIN — OXYCODONE HYDROCHLORIDE AND ACETAMINOPHEN 1 TABLET: 7.5; 325 TABLET ORAL at 14:55

## 2018-09-17 RX ADMIN — SODIUM CHLORIDE, PRESERVATIVE FREE 10 ML: 5 INJECTION INTRAVENOUS at 21:06

## 2018-09-17 RX ADMIN — Medication 4 MG: at 08:52

## 2018-09-17 ASSESSMENT — PAIN DESCRIPTION - DESCRIPTORS
DESCRIPTORS: ACHING;BURNING
DESCRIPTORS: ACHING
DESCRIPTORS: ACHING

## 2018-09-17 ASSESSMENT — PAIN DESCRIPTION - ONSET
ONSET: ON-GOING

## 2018-09-17 ASSESSMENT — PAIN DESCRIPTION - ORIENTATION
ORIENTATION: LEFT
ORIENTATION: LEFT

## 2018-09-17 ASSESSMENT — PAIN SCALES - GENERAL
PAINLEVEL_OUTOF10: 4
PAINLEVEL_OUTOF10: 8
PAINLEVEL_OUTOF10: 8
PAINLEVEL_OUTOF10: 9
PAINLEVEL_OUTOF10: 10
PAINLEVEL_OUTOF10: 7
PAINLEVEL_OUTOF10: 8

## 2018-09-17 ASSESSMENT — PAIN DESCRIPTION - LOCATION
LOCATION: FOOT

## 2018-09-17 ASSESSMENT — PAIN DESCRIPTION - PROGRESSION
CLINICAL_PROGRESSION: GRADUALLY IMPROVING
CLINICAL_PROGRESSION: NOT CHANGED
CLINICAL_PROGRESSION: GRADUALLY WORSENING

## 2018-09-17 ASSESSMENT — PAIN DESCRIPTION - FREQUENCY
FREQUENCY: CONTINUOUS

## 2018-09-17 ASSESSMENT — PAIN DESCRIPTION - PAIN TYPE
TYPE: ACUTE PAIN

## 2018-09-17 NOTE — PLAN OF CARE
Problem: Nutrition  Goal: Optimal nutrition therapy  Outcome: Ongoing  Nutrition Problem: Increased nutrient needs  Intervention: Food and/or Nutrient Delivery: Continue current diet, Start ONS  Nutritional Goals: pt will consume greater than 75% of meals and supplements provided

## 2018-09-17 NOTE — PROGRESS NOTES
The patient requested to re-evaluate the foot since it started leaking fluid. Gen- In mild distress. Mental status - A&O. Anxious. No confusion. Left foot - diffusely swollen and erythematous, up to 1/2 left shin. There is dark blister x2 on dorsum of foot, one at the bottom of 4th digit, and one on dorsum of foot. Sensation was intact throughout. One blister was aspirated for culture using 25G needle. It was slightly cloudy serosanguinous yellow fluid. Wound culture in ED is showing S. Aureus and coliform bacteria. A/P;  Diabetic foot infection, possible necrotizing soft tissue infection, due to polymicrobial pathogen including S. Aureus and coliform bacterias. Add Zosyn to Vancomycin for broader coverage. MRI and podiatry re-evaluation in am.   Elevated leg.

## 2018-09-17 NOTE — PROGRESS NOTES
Hospitalist Progress Note      Name:  Iggy Zhao /Age/Sex: 1975  (37 y.o. male)   MRN & CSN:  [id number] & [id number] Admission Date/Time: 9/15/2018  1:06 PM   Location:  [address] [place] [address] PCP: Brenda Pitts MD         Hospital Day: 3    Assessment and Plan:   Iggy Zhao is a 37 y.o.  male  who presents with <principal problem not specified>    · Sepsis due to infected pressure ulcer on bottom of left foot. Chronic injury with poor healing. Leukocytosis and tachycardia on admission. · Vanc and zoysn, started 9/15, with improvement of leukocytosis. · BL LE arterial US completed, report below  · DM2, uncontrolled with A1C 10.3. SSI. · Anxiety, depression, panic attacks - stable  · HLD, stable  · HTN, stable    Iv pain medications + baseline PO medications  Needs left foot MRI but on hold due to pacemaker  Pending BL LE arterial US report  Appreciate podiatry I/d left foot  VSx may do angiogram as outpatient, will wait for official recommendations    Diet Diet NPO, After Midnight  DIET CARB CONTROL;   DVT Prophylaxis [x] Lovenox, []  Heparin, [] SCDs, []No VTE prophylaxis, patient ambulating   GI Prophylaxis [x] PPI, [] H2 Blocker, [] No GI prophylaxis, patient is receiving diet/Tube Feeds   Code Status Full Code   Disposition Patient requires continued admission due to iv abx. Will DC to home when able. Will need HHC and wound clinic f/u   MDM [] Low, [x] Moderate,[]  High  Patient's risk as above due to two or more stable chronic illnesses      History of Present Illness:     Pt S&E. Wife at bedside. C/o continued left foot pain. B/w iv pain. 10-14 point ROS reviewed negative, unless as noted above    Objective:      Intake/Output Summary (Last 24 hours) at 18 1003  Last data filed at 18 0740   Gross per 24 hour   Intake              480 ml   Output             1800 ml   Net            -1320 ml      Vitals:   Vitals:    18 0500   BP: 127/78   Pulse: 80   Resp: 16 Temp: 98.3 °F (36.8 °C)   SpO2:      Physical Exam: *   GEN Awake male, laying in bed in no apparent distress. Appears given age. EYES Pupils are equally round. No scleral discharge  HENT Atraumatic and symmetric head  NECK No apparent thyromegaly  RESP Symmetric chest movement while on room air. CARDIO/VASC Peripheral pulses equal bilaterally and palpable. No peripheral edema. GI Abdomen is not distended. Rectal exam deferred.  Luna catheter is not present. HEME/LYMPH No petechiae or ecchymoses. MSK Spontaneous movement of BL upper extremities  SKIN Normal coloration, warm, dry. +left foot ulcer on dorsum surface  NEURO Cranial nerves appear grossly intact  PSYCH Awake, alert. Oriented x4    Medications:   Medications:    insulin lispro  0-18 Units Subcutaneous TID WC    insulin lispro  0-18 Units Subcutaneous 2 times per day    insulin lispro  30 Units Subcutaneous TID WC    insulin glargine  70 Units Subcutaneous Nightly    linagliptin  5 mg Oral Daily    piperacillin-tazobactam  3.375 g Intravenous Q6H    sodium chloride flush  10 mL Intravenous 2 times per day    enoxaparin  40 mg Subcutaneous Daily    vancomycin  1,250 mg Intravenous Q12H    influenza virus vaccine  0.5 mL Intramuscular Once      Infusions:    sodium chloride 50 mL/hr at 09/17/18 0700    dextrose       PRN Meds:   oxyCODONE-acetaminophen 1 tablet Q6H PRN   morphine 4 mg Q4H PRN   tiZANidine 4 mg Q6H PRN   sodium chloride flush 10 mL PRN   magnesium hydroxide 30 mL Daily PRN   ondansetron 4 mg Q6H PRN   glucose 15 g PRN   dextrose 12.5 g PRN   glucagon (rDNA) 1 mg PRN   dextrose 100 mL/hr PRN     BL LE arterial US, 9/16/18  1. Triphasic waveforms are seen throughout the right lower extremity without   significant stenosis or occlusion.    2. Biphasic waveforms are seen throughout the left lower extremity suggestive   for atherosclerotic disease. Anju Shearing is approximately 50-74% stenosis   involving the proximal left peroneal artery. Darl Black is no evidence of   occlusion.          Electronically signed by Indigo Levi DO on 9/17/2018 at 10:03 AM

## 2018-09-17 NOTE — PROGRESS NOTES
Progress Note( Dr. Verenice Lugo)  9/17/2018  Subjective:   Admit Date: 9/15/2018  PCP: Mary Max MD    Admitted For : Left Foot ulcer     Consulted For: better control of DM     Interval History: feeks a bit better . Had Arterial Doppler // Going for MRI     Denies any chest pains,   Denies SOB . Denies nausea or vomiting. No new bowel or bladder symptoms. Intake/Output Summary (Last 24 hours) at 09/17/18 0752  Last data filed at 09/17/18 0740   Gross per 24 hour   Intake              480 ml   Output             1800 ml   Net            -1320 ml       DATA    CBC:   Recent Labs      09/15/18   1017  09/16/18   0521   WBC  16.4*  14.0*   HGB  12.1*  10.6*   PLT  189  172    CMP:  Recent Labs      09/15/18   1017  09/16/18   0521   NA  130*  134*   K  4.3  4.0   CL  97*  100   CO2  24  24   BUN  18  19   CREATININE  0.9  1.1   CALCIUM  8.9  8.3   PROT  6.7   --    LABALBU  3.3*   --    BILITOT  0.8   --    ALKPHOS  80   --    AST  15   --    ALT  13   --      Lipids: Lab Results   Component Value Date    CHOL 173 07/24/2017    CHOL 168 10/15/2013    HDL 35 07/24/2017    TRIG 231 07/24/2017     Glucose:  Recent Labs      09/16/18   1900  09/16/18   2211  09/17/18   0218   POCGLU  223*  252*  219*     [id number]:  Lab Results   Component Value Date    LABA1C 10.3 09/16/2018     High Sensitivity TSH: Lab Results   Component Value Date    TSHHS 2.300 07/24/2017     Free T3: No results found for: FT3  Free T4:  Lab Results   Component Value Date    T4FREE 1.25 07/24/2017       Xr Foot Left (min 3 Views)    Result Date: 9/15/2018  EXAMINATION: 3 XRAY VIEWS OF THE LEFT FOOT 9/15/2018 11:23 am COMPARISON: 05/24/2018 HISTORY: Chronic left foot wound. FINDINGS: No evidence of fracture or dislocation. Mild soft tissue swelling along the dorsum. No erosive changes seen. Mild Achilles and plantar enthesopathy. No radiographic evidence for osteomyelitis. Nonspecific mild soft tissue swelling.      Xr Chest

## 2018-09-17 NOTE — CONSULTS
Department of Cardiovascular & Thoracic Surgery   Consult Note    Reason for Consult:  PAD    Admitting Physician: Dr. Elle Monge    Date of Consult: 9/17/18      History Obtained From:  patient     HISTORY OF PRESENT ILLNESS:    The patient is a 37 y.o. male who presents with L foot wounds. He has had a large wound on the plantar surface of the left foot for more than a year. He sees Dr. Kassandra Hawkins and goes to the wound clinic. He has uncontrolled diabetes, Hgb A1c is 10. He had worsening pain and odor to the foot which prompted him to come to the ED. He denies fever or chills. He has diabetic neuropathy. 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 He does not wear orthotics. He is a former smoker. He developed two new wounds on the top of the foot yesterday. Past Medical History:        Diagnosis Date    Abscess 2010    scrotal    Anxiety associated with depression     Back pain 7/2/2012    Chest pain 5/1/2013    Diabetic nephropathy ([address]     Diabetic neuropathy ([address] 12/22/2011    Diabetic ulcer of left midfoot associated with type 2 diabetes mellitus, with fat layer exposed ([address] 7/18/2017    Diverticulosis     C scope + Dr. Terri Rao DM (diabetes mellitus), type 2 ([address] 2002    DR. Turner podiatry    Hyperlipidemia LDL goal < 100 7/15/2013    Hypertension     Internal hemorrhoid     C scope + Dr. Guardado [place] fracture 1988    Panic attacks     Pericarditis 2003    Hospitalized with s/p heart cath normal    Peripheral autonomic neuropathy due to diabetes mellitus ([address]     Axonal EMG- [facility], March 2011    Sebaceous cyst 9/1/2011    URI (upper respiratory infection) 2/27/2012    WD-Wound, surgical, infected, initial encounter 12/8/2017    Wrist fracture 1986     Past Surgical History:        Procedure Laterality Date   [address] 2003, 2013    Normal (Dr. Skaggs Brinckerhoff)    COLONOSCOPY  6/2/2011    Pandiverticulosis, Nonbleeding internal hemorrhoids, Repeat colonoscopy at age 48- Dr. Paras Sky    \"had reconstruction surgery on nose a year after the other nose surgery\"    OTHER SURGICAL HISTORY Right 05/22/2015    I & D right great toe with partial amputation    OTHER SURGICAL HISTORY  12/04/2017    inc and drainage of abcess    SKIN BIOPSY N/A [id number]    sebaceous cyst removal times 4     TOE AMPUTATION      right great toe    TONSILLECTOMY AND ADENOIDECTOMY  1988    UPPER GASTROINTESTINAL ENDOSCOPY  06/2/2011    Mild gastritis with moderatley severe antritis, small hiatal hernia, Dr. Bentley Bruner     Current Medications:   Current Facility-Administered Medications: 0.9 % sodium chloride infusion, , Intravenous, Continuous  insulin lispro (HUMALOG) injection vial 0-18 Units, 0-18 Units, Subcutaneous, TID WC  insulin lispro (HUMALOG) injection vial 0-18 Units, 0-18 Units, Subcutaneous, 2 times per day  insulin lispro (HUMALOG) injection vial 30 Units, 30 Units, Subcutaneous, TID WC  insulin glargine (LANTUS) injection vial 70 Units, 70 Units, Subcutaneous, Nightly  linagliptin (TRADJENTA) tablet 5 mg, 5 mg, Oral, Daily  oxyCODONE-acetaminophen (PERCOCET) 7.5-325 MG per tablet 1 tablet, 1 tablet, Oral, Q6H PRN  morphine (PF) injection 4 mg, 4 mg, Intravenous, Q4H PRN  piperacillin-tazobactam (ZOSYN) 3.375 g in dextrose 50 mL IVPB (premix), 3.375 g, Intravenous, Q6H  tiZANidine (ZANAFLEX) tablet 4 mg, 4 mg, Oral, Q6H PRN  sodium chloride flush 0.9 % injection 10 mL, 10 mL, Intravenous, 2 times per day  sodium chloride flush 0.9 % injection 10 mL, 10 mL, Intravenous, PRN  magnesium hydroxide (MILK OF MAGNESIA) 400 MG/5ML suspension 30 mL, 30 mL, Oral, Daily PRN  ondansetron (ZOFRAN) injection 4 mg, 4 mg, Intravenous, Q6H PRN  enoxaparin (LOVENOX) injection 40 mg, 40 mg, Subcutaneous, Daily  vancomycin (VANCOCIN) 1,250 mg in dextrose 5 % 250 mL IVPB, 1,250 mg, Intravenous, Q12H  influenza quadrivalent split vaccine (FLUZONE;FLUARIX;FLULAVAL;AFLURIA) and cooperative. Skin: unremarkable  HEENT Normocephalic, atraumatic without obvious deformity. Neck: Supple, Trachea midline   Lungs: Good respiratory effort. Clear to auscultation  Heart: Regular rate/ rhythm   Abdomen: Soft, non-tender or non-distended   Extremities: 1+ edema warm well perfused biphasic DP, PT and toe pulses L foot. Diabetic ulcer to the plantar surface of the foot as well as two small wounds on the L 4th toe. Neurologic: Alert, grossly intact   Mental status: normal affect    DATA:  US  Impression:     1. Triphasic waveforms are seen throughout the right lower extremity without  significant stenosis or occlusion. 2. Biphasic waveforms are seen throughout the left lower extremity suggestive  for atherosclerotic disease. Lurlean Basilio is approximately 50-74% stenosis  involving the proximal left peroneal artery. Lurlean Basilio is no evidence of  occlusion. MRI  Impression:     Focal soft tissue ulceration along the dorsal aspect of the foot at the level  of the 4th digit with large underlying subcutaneous phlegmonous fluid  collection measuring 4.3 x 4.9 x 1.5 cm likely representing an abscess. Extensive cellulitis of the foot with myositis of the intrinsic musculature. Marrow edema in the 3rd and 4th metatarsals as well as the cuneiform and  cuboid bones suggesting reactive marrow edema.  No definite evidence of  marrow replacement although early osteomyelitis in the 3rd and 4th  metatarsals cannot be completely excluded. Small 4th MTP joint effusion without significant synovitis.  Reactive marrow  edema in the 4th proximal phalangeal base as well as the 4th metatarsal head  is noted without marrow replacement.  This suggests reactive marrow edema  although early osteomyelitis and septic arthritis cannot be completely  excluded.     Soft tissue ulceration along the plantar aspect of the foot without evidence  of underlying abscess.  There is cellulitis of the plantar soft tissues of  the foot as well.           IMPRESSION  Patient Active Problem List   Diagnosis    Hiatal hernia    Diabetes mellitus type 2, improved controlled    Diabetic neuropathy ([address]    Panic attack    Anxiety associated with depression    Neck pain    DANIELA (obstructive sleep apnea)    Urinary frequency    Bilateral carpal tunnel syndrome- left worse than right    Hyperlipidemia with target LDL less than 100    HTN, goal below 140/90    Bronchitis    Osteomyelitis ([address]    WD-Diabetic ulcer of left midfoot associated with type 2 diabetes mellitus, with fat layer exposed ([address]    Abscess    Periumbilical hernia    WD-Wound, surgical, infected, initial encounter    Sepsis ([address]       Diabetic foot ulcer      RECOMMENDATIONS:  Pt with 3 vessel runoff to the left foot with excellent doppler pulses and cap refill. He likely has microvascular disease due to uncontrolled diabetes. No vascular intervention necessary at this time.          Dayna Jang PA-C

## 2018-09-17 NOTE — CONSULTS
AMPUTATION      right great toe    TONSILLECTOMY AND ADENOIDECTOMY  1988    UPPER GASTROINTESTINAL ENDOSCOPY  06/2/2011    Mild gastritis with moderatley severe antritis, small hiatal hernia, Dr. Ira Quijano History   Problem Relation Age of Onset   Kiran Martin Cancer Mother         ?site   Kiran Martin Stroke Mother     Bleeding Prob Mother     Diabetes Father     Heart Disease Father     High Blood Pressure Father     Obesity Father     Kidney Disease Father     Diabetes Sister     High Blood Pressure Sister     Mental Illness Sister     Obesity Sister     High Blood Pressure Other     Mental Illness Other         bipolar    Other Son         cyst in ear canal    ADHD Daughter        SOCIAL HISTORY    Social History   Substance Use Topics    Smoking status: Former Smoker     Years: 20.00     Quit date: 11/18/2017    Smokeless tobacco: Never Used    Alcohol use No      Comment: 4 cans soda daily, 2 cups tea daily/alchohol- average one drink per month       ALLERGIES    Allergies   Allergen Reactions    Adhesive Tape Rash    Doxycycline Nausea And Vomiting       MEDICATIONS    No current facility-administered medications on file prior to encounter.       Current Outpatient Prescriptions on File Prior to Encounter   Medication Sig Dispense Refill    naproxen (NAPROSYN) 500 MG tablet Take 1 tablet by mouth 2 times daily as needed for Pain 30 tablet 0    insulin glargine (LANTUS) 100 UNIT/ML injection vial Inject 60 Units into the skin nightly 1 vial 3    insulin lispro (HUMALOG) 100 UNIT/ML injection vial Inject 30 Units into the skin 3 times daily (with meals) 5 vial 0    promethazine (PHENERGAN) 25 MG tablet Take 25 mg by mouth every 6 hours as needed for Nausea      tiZANidine (ZANAFLEX) 4 MG tablet Take 4 mg by mouth every 6 hours as needed           Objective:      BP (!) 108/59   Pulse 83   Temp 97.7 °F (36.5 °C) (Oral)   Resp 16   Ht 5' 10\" (1.778 m)   Wt 265 lb (120.2 kg) Status Clean;Dry; Intact 9/17/2018 10:05 AM   Dressing Changed Changed/New 9/17/2018 10:05 AM   Dressing/Treatment Open to air 9/17/2018  4:17 PM   Wound Cleansed Other (Comment) 9/17/2018  4:17 PM   Dressing Change Due 09/17/18 9/17/2018  4:17 PM   Wound Length (cm) 1 cm 9/17/2018  4:17 PM   Wound Width (cm) 0.8 cm 9/17/2018  4:17 PM   Wound Depth (cm)  0.3 9/17/2018  4:17 PM   Calculated Wound Size (cm^2) (l*w) 0.8 cm^2 9/17/2018  4:17 PM   Change in Wound Size % (l*w) 96.73 9/17/2018  4:17 PM   Distance Tunneling (cm) 0 cm 9/17/2018  4:17 PM   Tunneling Position ___ O'Clock 0 9/17/2018  4:17 PM   Undermining Starts ___ O'Clock 0 9/17/2018  4:17 PM   Undermining Ends___ O'Clock 0 9/17/2018  4:17 PM   Undermining Maxium Distance (cm) 0 9/17/2018  4:17 PM   Wound Assessment Pink;Gray 9/17/2018  4:17 PM   Drainage Amount Moderate 9/17/2018  4:17 PM   Drainage Description Serous 9/17/2018  4:17 PM   Odor None 9/17/2018  4:17 PM   Margins Defined edges 9/17/2018  4:17 PM   Shazia-wound Assessment Calloused 9/17/2018  4:17 PM   [place]%Wound Bed 50 9/17/2018  4:17 PM   Red%Wound Bed 0 9/17/2018  4:17 PM   Yellow%Wound Bed 00 9/17/2018  4:17 PM   Black%Wound Bed 0 9/17/2018  4:17 PM   Purple%Wound Bed 0 9/17/2018  4:17 PM   Other%Wound Bed 50 9/17/2018  4:17 PM   Culture Taken Yes 9/16/2018 11:35 PM   Number of days: 426       Wound 12/03/17 Other (Comment) Foot Left (Active)   Number of days: 287       Wound 12/08/17 #3 abdoment (onset 3 days ago) SURGICAL (Active)   Number of days: 283       Wound 12/08/17 #4 Lt plantar (onset 6 months ago) DIABETIC ALCALA 1 (Active)   Number of days: 283       Wound 05/18/18 # 5 LEFT PLANTAR (ONSET 1 YEAR AGO) DM WAG 1 (Active)   Number of days: 122       Wound 09/17/18 Left 4th toe (Active)   Wound Type Wound 9/17/2018  4:17 PM   Wound Diabetic 9/17/2018  4:17 PM   Dressing/Treatment Open to air 9/17/2018  4:17 PM   Wound Length (cm) 1.8 cm 9/17/2018  4:17 PM   Wound Width (cm) 1.5 cm 9/17/2018  4:17 PM   Other%Wound Bed 0 9/17/2018  4:17 PM   Number of days: 0       Incision 05/22/15 Foot Right (Active)   Number of days: 4762       Incision 12/04/17 Abdomen Mid (Active)   Number of days: 286       Response to treatment:  With complaints of pain. Pain Assessment:  Severity:  Moderate to severe  Quality of pain: sharp  Wound Pain Timing/Severity: when touched  Premedicated: yes    Plan:     Plan of Care: Wound 07/18/17 Other (Comment) WOUND #2 LEFT PLANTAR (ONSET 3 MTHS)-Dressing/Treatment: Open to air  Wound 09/17/18 Left 4th toe-Dressing/Treatment: Open to air  Wound 09/17/18 Left dorsal foot-Dressing/Treatment: Open to air  Pictures taken of wounds. Sitting on couch with left foot on a towel on the floor. Pradeep is 22. Heels intact. Mohsen Matias states he has seen Dr. Kalen Haynes and the plan is for an I&D tomorrow 9/18. He is getting in the shower after Wound assessment, so dressing supplies were left in room on counter and report was given to RN who can dress wounds after his shower. Specialty Bed Required : no  [] Low Air Loss   [] Pressure Redistribution  [] Fluid Immersion  [] Bariatric  [] Total Pressure Relief  [] Other:     Discharge Plan:  Placement for patient upon discharge: home  Hospice Care: no  Patient appropriate for Outpatient [address]: yes    Patient/Caregiver Teaching:  Level of patient/caregiver understanding able to:   Pt voices understanding of wound care.        Electronically signed by Tj Rodrigues RN, [facility] on 9/17/2018 at 4:34 PM

## 2018-09-17 NOTE — CONSULTS
[address] [place] [address], [address] [place] [address]                                   CONSULTATION    PATIENT NAME: Ayesha Gan                     :        1975  MED REC NO:   [id number]                          ROOM:       4016  ACCOUNT NO:   [de-identified]                          ADMIT DATE: 09/15/2018  PROVIDER:     Raffaele Harrell DPM    CONSULT DATE:  2018    HISTORY OF PRESENT ILLNESS:  This 45-year-old diabetic male presented to  the emergency room yesterday with a left lower extremity erythema, warmth  and ulceration on the plantar aspect of his left foot. He has a history of  diabetic foot ulceration and has been seen in the past by Dr. Diann Driscoll who  referred him on to the [address] [place] [address],[address]. After reviewing his notes, it looks  like his last visit at the [address] [facility],[address] was in 2018. He states there he  received total contact casting and offloading shoes and was seen by Dr. Neal Avitia. At some point, he decided it was not working and he discontinued  his followup visits at the [address] [place] [address],[address]. He has been treating it on his  own until yesterday's admission. He admits that he has been working on a  job spending a lot of time on his feet and he has just put up with the pain  and discomfort. He admits to a history of using opioid medications and/or  seeing Dr. Rekha Rosen at the pain clinic. He states it got to the point that  it was too painful yesterday to put up with. PHYSICAL EXAMINATION:  We found that he has nonpalpable pedal pulses on his  left foot. He has ulceration on the plantar aspect of the foot with a lot  of surrounding hyperkeratosis. The ulceration appears to be under the  second/third metatarsophalangeal joint. It is in to the subcutaneous  tissue. It was not probed today. Size of this ulceration appears to be  approximately 3 cm x 3 cm in to the subcutaneous tissue.   There is no bone  exposed within the wound

## 2018-09-17 NOTE — CONSULTS
Consults   MRI report reviewed. Likely abscess on the dorsal aspect the left foot. Plan:surgery tomorrow morning for I&D and debridement of the plantar ulceration left foot. Patient is n.p.o. After midnight.

## 2018-09-17 NOTE — PROGRESS NOTES
Nutrition Assessment    Type and Reason for Visit: Initial, Positive Nutrition Screen    Nutrition Recommendations:   · Continue current diet  · Start wound healing supplements    Malnutrition Assessment:  · Malnutrition Status: At risk for malnutrition  · Context: Chronic illness    Nutrition Diagnosis:   · Problem: Increased nutrient needs  · Etiology: related to Endocrine dysfunction     Signs and symptoms:  as evidenced by Presence of wounds    Nutrition Assessment:  · Subjective Assessment: Pt has a diabetic wound on his left foot. He has a fever and the wound is leaking. Pt weight appears stable and he is consuming 51-75% of his meals. Will provide pt with wound healing supplements and continue to follow  · Wound Type: Diabetic Ulcer  · Current Nutrition Therapies:  · Oral Diet Orders: Carb Control 4 Carbs/Meal   · Oral Diet intake: 51-75%  · Oral Nutrition Supplement (ONS) Orders: None  · Anthropometric Measures:  · Ht: 5' 10\" (177.8 cm)   · Current Body Wt: 265 lb (120.2 kg)  · Admission Body Wt: 265 lb (120.2 kg)  · Usual Body Wt: 260 lb (117.9 kg)  · % Weight Change: none noted  · Ideal Body Wt: 166 lb (75.3 kg), % Ideal Body 160%  · BMI Classification: BMI 35.0 - 39.9 Obese Class II  · Comparative Standards (Estimated Nutrition Needs):  · Estimated Daily Total Kcal: 9016-5819  · Estimated Daily Protein (g):     Estimated Intake vs Estimated Needs: Intake Improving    Nutrition Risk Level: Moderate    Nutrition Interventions:   Continue current diet, Start ONS  Continued Inpatient Monitoring, Education Not Indicated, Coordination of Care    Nutrition Evaluation:   · Evaluation: Goals set   · Goals: pt will consume greater than 75% of meals and supplements provided    · Monitoring: Meal Intake, Supplement Intake, Weight, Pertinent Labs, Wound Healing    See Adult Nutrition Doc Flowsheet for more detail.      Electronically signed by Adrianne Lefort, RD, LD on 1/46/72 at 11:25 AM    Contact

## 2018-09-18 LAB
CULTURE: NORMAL
GLUCOSE BLD-MCNC: 151 MG/DL (ref 70–99)
GLUCOSE BLD-MCNC: 167 MG/DL (ref 70–99)
GLUCOSE BLD-MCNC: 179 MG/DL (ref 70–99)
GLUCOSE BLD-MCNC: 180 MG/DL (ref 70–99)
GLUCOSE BLD-MCNC: 187 MG/DL (ref 70–99)
GLUCOSE BLD-MCNC: 214 MG/DL (ref 70–99)
Lab: NORMAL
ORGANISM: NORMAL
ORGANISM: NORMAL
REPORT STATUS: NORMAL
SPECIMEN: NORMAL

## 2018-09-18 PROCEDURE — 0H9NXZZ DRAINAGE OF LEFT FOOT SKIN, EXTERNAL APPROACH: ICD-10-PCS | Performed by: PODIATRIST

## 2018-09-18 PROCEDURE — 87073 CULTURE BACTERIA ANAEROBIC: CPT

## 2018-09-18 PROCEDURE — 0JBR0ZZ EXCISION OF LEFT FOOT SUBCUTANEOUS TISSUE AND FASCIA, OPEN APPROACH: ICD-10-PCS | Performed by: PODIATRIST

## 2018-09-18 PROCEDURE — 9990 CHARGE CONVERSION

## 2018-09-18 PROCEDURE — 82962 GLUCOSE BLOOD TEST: CPT

## 2018-09-18 PROCEDURE — 94761 N-INVAS EAR/PLS OXIMETRY MLT: CPT

## 2018-09-18 PROCEDURE — 87205 SMEAR GRAM STAIN: CPT

## 2018-09-18 PROCEDURE — 88305 TISSUE EXAM BY PATHOLOGIST: CPT

## 2018-09-18 PROCEDURE — 0Y6W0Z0 DETACHMENT AT LEFT 4TH TOE, COMPLETE, OPEN APPROACH: ICD-10-PCS | Performed by: PODIATRIST

## 2018-09-18 PROCEDURE — 87071 CULTURE AEROBIC QUANT OTHER: CPT

## 2018-09-18 PROCEDURE — 88311 DECALCIFY TISSUE: CPT

## 2018-09-18 RX ORDER — ASPIRIN 81 MG/1
81 TABLET, CHEWABLE ORAL DAILY
Status: DISCONTINUED | OUTPATIENT
Start: 2018-09-18 | End: 2018-09-23 | Stop reason: HOSPADM

## 2018-09-18 RX ADMIN — MORPHINE SULFATE 4 MG: 4 INJECTION INTRAVENOUS at 20:56

## 2018-09-18 RX ADMIN — INSULIN LISPRO 4 UNITS: 100 INJECTION, SOLUTION INTRAVENOUS; SUBCUTANEOUS at 20:56

## 2018-09-18 RX ADMIN — INSULIN LISPRO 20 UNITS: 100 INJECTION, SOLUTION INTRAVENOUS; SUBCUTANEOUS at 17:52

## 2018-09-18 RX ADMIN — OXYCODONE HYDROCHLORIDE AND ACETAMINOPHEN 1 TABLET: 7.5; 325 TABLET ORAL at 09:47

## 2018-09-18 RX ADMIN — SODIUM CHLORIDE: 9 INJECTION, SOLUTION INTRAVENOUS at 04:35

## 2018-09-18 RX ADMIN — ASPIRIN 81 MG 81 MG: 81 TABLET ORAL at 17:46

## 2018-09-18 RX ADMIN — INSULIN LISPRO 20 UNITS: 100 INJECTION, SOLUTION INTRAVENOUS; SUBCUTANEOUS at 11:19

## 2018-09-18 RX ADMIN — MORPHINE SULFATE 4 MG: 4 INJECTION INTRAVENOUS at 04:35

## 2018-09-18 RX ADMIN — INSULIN LISPRO 4 UNITS: 100 INJECTION, SOLUTION INTRAVENOUS; SUBCUTANEOUS at 11:20

## 2018-09-18 RX ADMIN — SODIUM CHLORIDE: 9 INJECTION, SOLUTION INTRAVENOUS at 07:25

## 2018-09-18 RX ADMIN — Medication 4 MG: at 20:55

## 2018-09-18 RX ADMIN — INSULIN GLARGINE 70 UNITS: 100 INJECTION, SOLUTION SUBCUTANEOUS at 20:57

## 2018-09-18 RX ADMIN — Medication 4 MG: at 11:54

## 2018-09-18 RX ADMIN — SODIUM CHLORIDE, PRESERVATIVE FREE 10 ML: 5 INJECTION INTRAVENOUS at 20:56

## 2018-09-18 RX ADMIN — MORPHINE SULFATE 4 MG: 4 INJECTION INTRAVENOUS at 11:54

## 2018-09-18 RX ADMIN — INSULIN LISPRO 4 UNITS: 100 INJECTION, SOLUTION INTRAVENOUS; SUBCUTANEOUS at 17:53

## 2018-09-18 RX ADMIN — OXYCODONE HYDROCHLORIDE AND ACETAMINOPHEN 1 TABLET: 7.5; 325 TABLET ORAL at 17:46

## 2018-09-18 RX ADMIN — LINAGLIPTIN 5 MG: 5 TABLET, FILM COATED ORAL at 09:42

## 2018-09-18 ASSESSMENT — PAIN DESCRIPTION - FREQUENCY: FREQUENCY: CONTINUOUS

## 2018-09-18 ASSESSMENT — PAIN SCALES - GENERAL
PAINLEVEL_OUTOF10: 8
PAINLEVEL_OUTOF10: 8
PAINLEVEL_OUTOF10: 9
PAINLEVEL_OUTOF10: 7
PAINLEVEL_OUTOF10: 8
PAINLEVEL_OUTOF10: 9

## 2018-09-18 ASSESSMENT — PAIN DESCRIPTION - LOCATION
LOCATION: FOOT
LOCATION: FOOT

## 2018-09-18 ASSESSMENT — PAIN DESCRIPTION - ORIENTATION
ORIENTATION: LEFT
ORIENTATION: LEFT

## 2018-09-18 ASSESSMENT — PAIN DESCRIPTION - DESCRIPTORS: DESCRIPTORS: ACHING

## 2018-09-18 ASSESSMENT — PAIN DESCRIPTION - PAIN TYPE
TYPE: SURGICAL PAIN
TYPE: ACUTE PAIN

## 2018-09-18 ASSESSMENT — PAIN DESCRIPTION - PROGRESSION: CLINICAL_PROGRESSION: GRADUALLY IMPROVING

## 2018-09-18 ASSESSMENT — PAIN DESCRIPTION - ONSET: ONSET: ON-GOING

## 2018-09-18 NOTE — PROGRESS NOTES
Progress Note( Dr. Duncan Cr)  9/18/2018  Subjective:   Admit Date: 9/15/2018  PCP: Naty Livingston MD    Admitted For : Left Foot ulcer     Consulted For: better control of DM     Interval History: feeks a bit better . Had left 4 th toe amputated , and debrindment of the left foot wound     Denies any chest pains,   Denies SOB . Denies nausea or vomiting. No new bowel or bladder symptoms. Intake/Output Summary (Last 24 hours) at 09/18/18 0738  Last data filed at 09/18/18 0601   Gross per 24 hour   Intake             2546 ml   Output             1100 ml   Net             1446 ml       DATA    CBC:   Recent Labs      09/15/18   1017  09/16/18   0521  09/17/18   0842   WBC  16.4*  14.0*  12.7*   HGB  12.1*  10.6*  11.7*   PLT  189  172  232    CMP:  Recent Labs      09/15/18   1017  09/16/18   0521  09/17/18   0842   NA  130*  134*  135   K  4.3  4.0  3.9   CL  97*  100  98*   CO2  24 24  24   BUN  18  19  15   CREATININE  0.9  1.1  1.1   CALCIUM  8.9  8.3  8.3   PROT  6.7   --    --    LABALBU  3.3*   --    --    BILITOT  0.8   --    --    ALKPHOS  80   --    --    AST  15   --    --    ALT  13   --    --      Lipids:   Lab Results   Component Value Date    CHOL 173 07/24/2017    CHOL 168 10/15/2013    HDL 35 07/24/2017    TRIG 231 07/24/2017     Glucose:  Recent Labs      09/17/18   2104  09/18/18   0205  09/18/18   0720   POCGLU  193*  214*  179*     GusibefgaxT4O:  Lab Results   Component Value Date    LABA1C 10.3 09/16/2018     High Sensitivity TSH:   Lab Results   Component Value Date    TSHHS 2.300 07/24/2017     Free T3: No results found for: FT3  Free T4:  Lab Results   Component Value Date    T4FREE 1.25 07/24/2017       Xr Foot Left (min 3 Views)    Result Date: 9/15/2018  EXAMINATION: 3 XRAY VIEWS OF THE LEFT FOOT 9/15/2018 11:23 am COMPARISON: 05/24/2018 HISTORY: Chronic left foot wound. FINDINGS: No evidence of fracture or dislocation. Mild soft tissue swelling along the dorsum.   No erosive

## 2018-09-18 NOTE — ANESTHESIA PRE-OP
Department of Anesthesiology  Preprocedure Note       Name:  Dion An   Age:  37 y.o.  :  1975                                          MRN:  [id number]         Date:  2018      Surgeon:    Procedure:    Medications prior to admission:   Prior to Admission medications    Medication Sig Start Date End Date Taking? Authorizing Provider   oxyCODONE-acetaminophen (PERCOCET) 7.5-325 MG per tablet Take 1 tablet by mouth every 6 hours as needed for Pain. Carly Simpson     Historical Provider, MD   naproxen (NAPROSYN) 500 MG tablet Take 1 tablet by mouth 2 times daily as needed for Pain 18   MANI Chao   insulin glargine (LANTUS) 100 UNIT/ML injection vial Inject 60 Units into the skin nightly 17   Aubrey Gates MD   insulin lispro (HUMALOG) 100 UNIT/ML injection vial Inject 30 Units into the skin 3 times daily (with meals) 17   Aubrey Gates MD   promethazine (PHENERGAN) 25 MG tablet Take 25 mg by mouth every 6 hours as needed for Nausea    Historical Provider, MD   tiZANidine (ZANAFLEX) 4 MG tablet Take 4 mg by mouth every 6 hours as needed    Historical Provider, MD       Current medications:    Current Facility-Administered Medications   Medication Dose Route Frequency Provider Last Rate Last Dose    0.9 % sodium chloride infusion   Intravenous Continuous Jeanette Thao MD 50 mL/hr at 18 0700      insulin lispro (HUMALOG) injection vial 0-18 Units  0-18 Units Subcutaneous TID  Ar Hills MD   6 Units at 18 1440    insulin lispro (HUMALOG) injection vial 0-18 Units  0-18 Units Subcutaneous 2 times per day Ar Hills MD   3 Units at 18 2113    insulin lispro (HUMALOG) injection vial 30 Units  30 Units Subcutaneous TID  Ar Hills MD   30 Units at 18 1439    insulin glargine (LANTUS) injection vial 70 Units  70 Units Subcutaneous Nightly Ar Hills MD   70 Units at 18    linagliptin (TRADJENTA) tablet 5 mg  5 mg Oral year after the other nose surgery\"    OTHER SURGICAL HISTORY Right 05/22/2015    I & D right great toe with partial amputation    OTHER SURGICAL HISTORY  12/04/2017    inc and drainage of abcess    SKIN BIOPSY N/A [id number]    sebaceous cyst removal times 4     TOE AMPUTATION      right great toe    TONSILLECTOMY AND ADENOIDECTOMY  1988    UPPER GASTROINTESTINAL ENDOSCOPY  06/2/2011    Mild gastritis with moderatley severe antritis, small hiatal hernia, Dr. Maggie Nolan       Social History:    Social History   Substance Use Topics    Smoking status: Former Smoker     Years: 20.00     Quit date: 11/18/2017    Smokeless tobacco: Never Used    Alcohol use No      Comment: 4 cans soda daily, 2 cups tea daily/alchohol- average one drink per month                                Counseling given: Not Answered      Vital Signs (Current):   Vitals:    09/16/18 2300 09/17/18 0500 09/17/18 1211 09/17/18 1757   BP: 116/70 127/78 (!) 108/59 (!) 109/58   Pulse: 81 80 83 87   Resp: 16 16     Temp: 99 °F (37.2 °C) 98.3 °F (36.8 °C) 97.7 °F (36.5 °C) 98.8 °F (37.1 °C)   TempSrc: Oral Oral Oral Oral   SpO2:   98% 95%   Weight:       Height:                                                  BP Readings from Last 3 Encounters:   09/17/18 (!) 109/58   07/09/18 (!) 150/91   05/27/18 122/88       NPO Status:                                                                                 BMI:   Wt Readings from Last 3 Encounters:   09/15/18 265 lb (120.2 kg)   07/09/18 280 lb (127 kg)   05/27/18 270 lb (122.5 kg)     Body mass index is 38.02 kg/m².     Anesthesia Evaluation  Patient summary reviewed and Nursing notes reviewed no history of anesthetic complications:   Airway: Mallampati: II  TM distance: >3 FB     Mouth opening: > = 3 FB Dental:    (+) other  Comment: poor dentition    Pulmonary:   (+) sleep apnea:                             Cardiovascular:    (+) hypertension:,       ECG reviewed               Beta Blocker:  Not on Beta Blocker         Neuro/Psych:   (+) neuromuscular disease:, depression/anxiety              ROS comment: Panic attacks  Peripheral neuropathy  Neck pain GI/Hepatic/Renal:   (+) hiatal hernia,           Endo/Other:    (+) DiabetesType II DM, using insulin, . Abdominal:   (+) obese,         Vascular:   + PVD, aortic or cerebral, . Patient states that he has had chest pain that radiates to left arm in recent past.  Discussed with Dr Rodolfo Vazquez. Will discuss with surgeon. EKG ordered. Anesthesia Plan      general and TIVA     ASA 3       Induction: intravenous. Anesthetic plan and risks discussed with patient.                     APPLE Sy - CRNA   9/17/2018

## 2018-09-18 NOTE — CONSULTS
Monitoring:Reviewed BG targets and A1C targets. Discussed importance of notifying Provider when BG not in target. · Meal planning: Reviewed importance of eating three meals per day and plate method for consistent carb intake. Verbalized understanding. · Hypoglycemia: Reviewed symptoms, prevention and treatment. Verbalized understanding. · Hyperglycemia:  Reviewed symptoms, prevention and treatment. Verbalized understanding. Discussed meds to bed program.  Patient in agreement. Discussed outpatient education and patient did not express interest in attending. Printed information Diabetes \"Living Your Life Well With Diabetes\",  Contact number for Diabetes Educator and resources for ongoing education and support left at bedside. Christine Thacker RN, BSN, CDE

## 2018-09-18 NOTE — BRIEF OP NOTE
Brief Postoperative Note    Simón Gutierrez  YOB: 1975  [id number]    Pre-operative Diagnosis:   1. Abscess dorsal left foot and fourth toe. 2. Plantar ulceration sub second and third MTP joints, left foot. Post-operative Diagnosis: Same with necrotic fourth toe. Procedure:   1. Incision and drainage of the left dorsal foot and fourth toe. 2. Amputation of left fourth toe at the MTP joint. 3. Excisional debridement of the left plantar ulceration. Anesthesia: [facility] [place]    Surgeons/Assistants: [place]    Estimated Blood Loss: less than 50     Complications: None    Specimens: Was Obtained: Deep cultures taken. Findings: necrotic subcutaneous tissue present. The fourth toe was fetid and therefore amputated.     Electronically signed by Stewart Bonner DPM on 9/18/2018 at 8:43 AM

## 2018-09-18 NOTE — PROGRESS NOTES
Hospitalist Progress Note      Name:  Jazlyn Kee /Age/Sex: 1975  (37 y.o. male)   MRN & CSN:  [id number] & [id number] Admission Date/Time: 9/15/2018  1:06 PM   Location:  [address] [place] [address] PCP: Maximilian Hayward MD         Hospital Day: 4    Assessment and Plan:   Jazlyn Kee is a 37 y.o.  male  who presents with <principal problem not specified>    · Sepsis due to infected pressure ulcer on bottom of left foot. Chronic injury with poor healing. Leukocytosis and tachycardia on admission. · Vanc and zoysn, started 9/15, had surgery today. · DM2, uncontrolled with A1C 10.3. SSI. · Anxiety, depression, panic attacks - stable  · HLD, stable  · HTN, stable        Diet Dietary Nutrition Supplements: Wound Healing Oral Supplement  DIET CARB CONTROL;   DVT Prophylaxis [x] Lovenox, []  Heparin, [] SCDs, []No VTE prophylaxis, patient ambulating   GI Prophylaxis [x] PPI, [] H2 Blocker, [] No GI prophylaxis, patient is receiving diet/Tube Feeds   Code Status Full Code   Disposition Patient requires continued admission due to iv abx. Will DC to home when able. Will need HHC and wound clinic f/u   MDM [] Low, [x] Moderate,[]  High  Patient's risk as above due to two or more stable chronic illnesses      History of Present Illness:     Seen at the bedside, had surgery today, no acute events, no chest pain/shortness of breath. 10-14 point ROS reviewed negative, unless as noted above    Objective: Intake/Output Summary (Last 24 hours) at 18 1257  Last data filed at 18 1000   Gross per 24 hour   Intake             2346 ml   Output              400 ml   Net             1946 ml      Vitals:   Vitals:    18 1000   BP: (!) 142/79   Pulse: 81   Resp: 18   Temp: 97.8 °F (36.6 °C)   SpO2: 99%     Physical Exam: *   GEN Awake male, laying in bed in no apparent distress. Appears given age. EYES Pupils are equally round.   No scleral discharge  HENT Atraumatic and symmetric head  NECK No apparent

## 2018-09-18 NOTE — PLAN OF CARE
Problem: Pain:  Goal: Pain level will decrease  Pain level will decrease   Outcome: Ongoing    Goal: Control of acute pain  Control of acute pain   Outcome: Ongoing    Goal: Control of chronic pain  Control of chronic pain   Outcome: Ongoing      Problem: Blood Glucose:  Goal: Ability to maintain appropriate glucose levels will improve  Ability to maintain appropriate glucose levels will improve   Outcome: Ongoing      Problem: Falls - Risk of:  Goal: Will remain free from falls  Will remain free from falls   Outcome: Ongoing    Goal: Absence of physical injury  Absence of physical injury   Outcome: Ongoing

## 2018-09-19 LAB
BASOPHILS ABSOLUTE: 0.1 K/CU MM
BASOPHILS RELATIVE PERCENT: 0.7 % (ref 0–1)
C-REACTIVE PROTEIN, HIGH SENSITIVITY: 97.7 MG/L
DIFFERENTIAL TYPE: ABNORMAL
EKG ATRIAL RATE: 87 BPM
EKG DIAGNOSIS: NORMAL
EKG P AXIS: 36 DEGREES
EKG P-R INTERVAL: 172 MS
EKG Q-T INTERVAL: 362 MS
EKG QRS DURATION: 80 MS
EKG QTC CALCULATION (BAZETT): 435 MS
EKG R AXIS: 36 DEGREES
EKG T AXIS: 46 DEGREES
EKG VENTRICULAR RATE: 87 BPM
EOSINOPHILS ABSOLUTE: 0.2 K/CU MM
EOSINOPHILS RELATIVE PERCENT: 2.4 % (ref 0–3)
ERYTHROCYTE SEDIMENTATION RATE: >120 MM/HR (ref 0–15)
GLUCOSE BLD-MCNC: 106 MG/DL (ref 70–99)
GLUCOSE BLD-MCNC: 165 MG/DL (ref 70–99)
GLUCOSE BLD-MCNC: 182 MG/DL (ref 70–99)
GLUCOSE BLD-MCNC: 199 MG/DL (ref 70–99)
GLUCOSE BLD-MCNC: 207 MG/DL (ref 70–99)
GLUCOSE BLD-MCNC: 231 MG/DL (ref 70–99)
HCT VFR BLD CALC: 30.3 % (ref 42–52)
HEMOGLOBIN: 10.2 GM/DL (ref 13.5–18)
IMMATURE NEUTROPHIL %: 0.6 % (ref 0–0.43)
LYMPHOCYTES ABSOLUTE: 1.2 K/CU MM
LYMPHOCYTES RELATIVE PERCENT: 14 % (ref 24–44)
MCH RBC QN AUTO: 30.6 PG (ref 27–31)
MCHC RBC AUTO-ENTMCNC: 33.7 % (ref 32–36)
MCV RBC AUTO: 91 FL (ref 78–100)
MONOCYTES ABSOLUTE: 0.8 K/CU MM
MONOCYTES RELATIVE PERCENT: 9.5 % (ref 0–4)
NUCLEATED RBC %: 0 %
PDW BLD-RTO: 11.9 % (ref 11.7–14.9)
PLATELET # BLD: 227 K/CU MM (ref 140–440)
PMV BLD AUTO: 9.8 FL (ref 7.5–11.1)
RBC # BLD: 3.33 M/CU MM (ref 4.6–6.2)
REASON FOR REJECTION: NORMAL
REJECTED TEST: NORMAL
SEGMENTED NEUTROPHILS ABSOLUTE COUNT: 6.1 K/CU MM
SEGMENTED NEUTROPHILS RELATIVE PERCENT: 72.8 % (ref 36–66)
SOURCE: NORMAL
TOTAL IMMATURE NEUTOROPHIL: 0.05 K/CU MM
TOTAL NUCLEATED RBC: 0 K/CU MM
WBC # BLD: 8.4 K/CU MM (ref 4–10.5)

## 2018-09-19 PROCEDURE — 97535 SELF CARE MNGMENT TRAINING: CPT

## 2018-09-19 PROCEDURE — 90686 IIV4 VACC NO PRSV 0.5 ML IM: CPT

## 2018-09-19 PROCEDURE — 85025 COMPLETE CBC W/AUTO DIFF WBC: CPT

## 2018-09-19 PROCEDURE — 97530 THERAPEUTIC ACTIVITIES: CPT

## 2018-09-19 PROCEDURE — G8987 SELF CARE CURRENT STATUS: HCPCS

## 2018-09-19 PROCEDURE — G8988 SELF CARE GOAL STATUS: HCPCS

## 2018-09-19 PROCEDURE — 97605 NEG PRS WND THER DME<=50SQCM: CPT

## 2018-09-19 PROCEDURE — 85652 RBC SED RATE AUTOMATED: CPT

## 2018-09-19 PROCEDURE — 97166 OT EVAL MOD COMPLEX 45 MIN: CPT

## 2018-09-19 PROCEDURE — G8979 MOBILITY GOAL STATUS: HCPCS

## 2018-09-19 PROCEDURE — 97116 GAIT TRAINING THERAPY: CPT

## 2018-09-19 PROCEDURE — 97162 PT EVAL MOD COMPLEX 30 MIN: CPT

## 2018-09-19 PROCEDURE — G0008 ADMIN INFLUENZA VIRUS VAC: HCPCS

## 2018-09-19 PROCEDURE — 82962 GLUCOSE BLOOD TEST: CPT

## 2018-09-19 PROCEDURE — 86140 C-REACTIVE PROTEIN: CPT

## 2018-09-19 PROCEDURE — 36415 COLL VENOUS BLD VENIPUNCTURE: CPT

## 2018-09-19 PROCEDURE — 9990 CHARGE CONVERSION

## 2018-09-19 PROCEDURE — G8978 MOBILITY CURRENT STATUS: HCPCS

## 2018-09-19 PROCEDURE — G8989 SELF CARE D/C STATUS: HCPCS

## 2018-09-19 PROCEDURE — 99254 IP/OBS CNSLTJ NEW/EST MOD 60: CPT | Performed by: INTERNAL MEDICINE

## 2018-09-19 RX ORDER — GABAPENTIN 100 MG/1
200 CAPSULE ORAL 3 TIMES DAILY
Status: DISCONTINUED | OUTPATIENT
Start: 2018-09-19 | End: 2018-09-20

## 2018-09-19 RX ADMIN — INSULIN LISPRO 20 UNITS: 100 INJECTION, SOLUTION INTRAVENOUS; SUBCUTANEOUS at 09:37

## 2018-09-19 RX ADMIN — MORPHINE SULFATE 4 MG: 4 INJECTION INTRAVENOUS at 08:03

## 2018-09-19 RX ADMIN — Medication 4 MG: at 21:15

## 2018-09-19 RX ADMIN — LINAGLIPTIN 5 MG: 5 TABLET, FILM COATED ORAL at 09:52

## 2018-09-19 RX ADMIN — INSULIN LISPRO 8 UNITS: 100 INJECTION, SOLUTION INTRAVENOUS; SUBCUTANEOUS at 09:33

## 2018-09-19 RX ADMIN — CEFTRIAXONE SODIUM 2 G: 2 INJECTION, POWDER, FOR SOLUTION INTRAMUSCULAR; INTRAVENOUS at 21:15

## 2018-09-19 RX ADMIN — METRONIDAZOLE 500 MG: 500 INJECTION, SOLUTION INTRAVENOUS at 18:28

## 2018-09-19 RX ADMIN — Medication 4 MG: at 08:02

## 2018-09-19 RX ADMIN — ASPIRIN 81 MG 81 MG: 81 TABLET ORAL at 09:38

## 2018-09-19 RX ADMIN — INSULIN LISPRO 4 UNITS: 100 INJECTION, SOLUTION INTRAVENOUS; SUBCUTANEOUS at 19:09

## 2018-09-19 RX ADMIN — INSULIN LISPRO 4 UNITS: 100 INJECTION, SOLUTION INTRAVENOUS; SUBCUTANEOUS at 00:58

## 2018-09-19 RX ADMIN — MORPHINE SULFATE 4 MG: 4 INJECTION INTRAVENOUS at 21:15

## 2018-09-19 RX ADMIN — MORPHINE SULFATE 4 MG: 4 INJECTION INTRAVENOUS at 12:55

## 2018-09-19 RX ADMIN — SODIUM CHLORIDE: 9 INJECTION, SOLUTION INTRAVENOUS at 04:34

## 2018-09-19 RX ADMIN — INSULIN LISPRO 20 UNITS: 100 INJECTION, SOLUTION INTRAVENOUS; SUBCUTANEOUS at 19:09

## 2018-09-19 RX ADMIN — MORPHINE SULFATE 4 MG: 4 INJECTION INTRAVENOUS at 01:03

## 2018-09-19 RX ADMIN — OXYCODONE HYDROCHLORIDE AND ACETAMINOPHEN 1 TABLET: 7.5; 325 TABLET ORAL at 18:28

## 2018-09-19 RX ADMIN — INSULIN LISPRO 8 UNITS: 100 INJECTION, SOLUTION INTRAVENOUS; SUBCUTANEOUS at 21:18

## 2018-09-19 RX ADMIN — OXYCODONE HYDROCHLORIDE AND ACETAMINOPHEN 1 TABLET: 7.5; 325 TABLET ORAL at 04:34

## 2018-09-19 RX ADMIN — SODIUM CHLORIDE, PRESERVATIVE FREE 10 ML: 5 INJECTION INTRAVENOUS at 09:41

## 2018-09-19 RX ADMIN — INSULIN GLARGINE 70 UNITS: 100 INJECTION, SOLUTION SUBCUTANEOUS at 21:17

## 2018-09-19 ASSESSMENT — PAIN DESCRIPTION - LOCATION
LOCATION: FOOT
LOCATION: FOOT

## 2018-09-19 ASSESSMENT — PAIN SCALES - GENERAL
PAINLEVEL_OUTOF10: 9
PAINLEVEL_OUTOF10: 9
PAINLEVEL_OUTOF10: 8
PAINLEVEL_OUTOF10: 9
PAINLEVEL_OUTOF10: 5
PAINLEVEL_OUTOF10: 5
PAINLEVEL_OUTOF10: 8

## 2018-09-19 ASSESSMENT — PAIN DESCRIPTION - ORIENTATION
ORIENTATION: LEFT
ORIENTATION: LEFT

## 2018-09-19 ASSESSMENT — PAIN DESCRIPTION - DESCRIPTORS: DESCRIPTORS: ACHING

## 2018-09-19 ASSESSMENT — PAIN DESCRIPTION - PAIN TYPE
TYPE: SURGICAL PAIN
TYPE: SURGICAL PAIN

## 2018-09-19 ASSESSMENT — PAIN DESCRIPTION - ONSET: ONSET: ON-GOING

## 2018-09-19 ASSESSMENT — PAIN DESCRIPTION - FREQUENCY: FREQUENCY: CONTINUOUS

## 2018-09-19 NOTE — CARE COORDINATION
Spoke with pt and he has been to [facility] in past and open to having them again. He also wanted to know about meds to beds. I explained program to him and he is agreeable, will let nursing know. CHELITA Alatorre starting Wound VAC application.

## 2018-09-19 NOTE — PLAN OF CARE
Problem: Pain:  Goal: Pain level will decrease  Pain level will decrease   Outcome: Ongoing    Goal: Control of acute pain  Control of acute pain   Outcome: Ongoing    Goal: Control of chronic pain  Control of chronic pain   Outcome: Ongoing      Problem: Blood Glucose:  Goal: Ability to maintain appropriate glucose levels will improve  Ability to maintain appropriate glucose levels will improve   Outcome: Ongoing      Problem: Falls - Risk of:  Goal: Will remain free from falls  Will remain free from falls   Outcome: Ongoing    Goal: Absence of physical injury  Absence of physical injury   Outcome: Ongoing      Problem: Serum Glucose Level - Abnormal:  Goal: Ability to maintain appropriate glucose levels has stabilized  Ability to maintain appropriate glucose levels has stabilized   Outcome: Ongoing

## 2018-09-19 NOTE — PROGRESS NOTES
GEN Awake male, laying in bed in no apparent distress. Appears given age. EYES Pupils are equally round. No scleral discharge  HENT Atraumatic and symmetric head  NECK No apparent thyromegaly  RESP Symmetric chest movement while on room air. CARDIO/VASC Peripheral pulses equal bilaterally and palpable. No peripheral edema. GI Abdomen is not distended. Rectal exam deferred.  Luna catheter is not present. HEME/LYMPH No petechiae or ecchymoses. MSK Spontaneous movement of BL upper extremities  SKIN Normal coloration, warm, dry. +left foot ulcer on dorsum surface  NEURO Cranial nerves appear grossly intact  PSYCH Awake, alert. Oriented x4    Medications:   Medications:    insulin lispro  20 Units Subcutaneous TID WC    insulin lispro  0-25 Units Subcutaneous TID WC    insulin lispro  0-25 Units Subcutaneous 2 times per day    aspirin  81 mg Oral Daily    insulin glargine  70 Units Subcutaneous Nightly    linagliptin  5 mg Oral Daily    piperacillin-tazobactam  3.375 g Intravenous Q6H    sodium chloride flush  10 mL Intravenous 2 times per day    enoxaparin  40 mg Subcutaneous Daily    vancomycin  1,250 mg Intravenous Q12H      Infusions:    sodium chloride 50 mL/hr at 09/19/18 0434    dextrose       PRN Meds:     oxyCODONE-acetaminophen 1 tablet Q6H PRN   morphine 4 mg Q4H PRN   tiZANidine 4 mg Q6H PRN   sodium chloride flush 10 mL PRN   magnesium hydroxide 30 mL Daily PRN   ondansetron 4 mg Q6H PRN   glucose 15 g PRN   dextrose 12.5 g PRN   glucagon (rDNA) 1 mg PRN   dextrose 100 mL/hr PRN     BL LE arterial US, 9/16/18  1. Triphasic waveforms are seen throughout the right lower extremity without   significant stenosis or occlusion. 2. Biphasic waveforms are seen throughout the left lower extremity suggestive   for atherosclerotic disease. Anju Shearing is approximately 50-74% stenosis   involving the proximal left peroneal artery. Anju Shearing is no evidence of   occlusion.          Electronically signed

## 2018-09-19 NOTE — PROGRESS NOTES
acute process. Vl Dup Lower Extremity Arteries Bilateral    Result Date: 9/17/2018  EXAMINATION: ARTERIAL DUPLEX ULTRASOUND OF THE BILATERAL LOWER EXTREMITIES  9/16/2018 9:02 pm TECHNIQUE: Duplex ultrasound and Doppler images were obtained of the bilateral lower extremities       1. Triphasic waveforms are seen throughout the right lower extremity without significant stenosis or occlusion. 2. Biphasic waveforms are seen throughout the left lower extremity suggestive for atherosclerotic disease. There is approximately 50-74% stenosis involving the proximal left peroneal artery. There is no evidence of occlusion. Vl Dup Lower Extremity Venous Left    Result Date: 9/15/2018  EXAMINATION: DUPLEX VENOUS ULTRASOUND OF THE LEFT LOWER EXTREMITY 9/15/2018 11:18 am TECHNIQUE: Duplex ultrasound and Doppler images were obtained of the left lower extremity. .     No evidence of DVT in the left lower extremity.        Scheduled Medicines   Medications:    insulin lispro  20 Units Subcutaneous TID WC    insulin lispro  0-25 Units Subcutaneous TID WC    insulin lispro  0-25 Units Subcutaneous 2 times per day    influenza virus vaccine  0.5 mL Intramuscular Once    aspirin  81 mg Oral Daily    insulin glargine  70 Units Subcutaneous Nightly    linagliptin  5 mg Oral Daily    piperacillin-tazobactam  3.375 g Intravenous Q6H    sodium chloride flush  10 mL Intravenous 2 times per day    enoxaparin  40 mg Subcutaneous Daily    vancomycin  1,250 mg Intravenous Q12H      Infusions:    sodium chloride 50 mL/hr at 09/19/18 0434    dextrose           Objective:   Vitals: /63   Pulse 83   Temp 97.6 °F (36.4 °C) (Oral)   Resp 18   Ht 5' 10\" (1.778 m)   Wt 259 lb 6.4 oz (117.7 kg)   SpO2 99%   BMI 37.22 kg/m²   General appearance: alert and cooperative with exam  Neck: no JVD or bruit  Thyroid : Normal lobes   Lungs: Has Vesicular Breath sounds   Heart:  regular rate and rhythm  Abdomen: soft, non-tender;

## 2018-09-19 NOTE — PROGRESS NOTES
Yes  Patient assessed for rehabilitation services?: Yes  Family / Caregiver Present: No  Subjective  Subjective: Pt received in supine upon OT arrival. Pt pleasant and agreeable to therapy eval.   Pain Assessment  Patient Currently in Pain: Yes  Pain Assessment: 0-10  Pain Level: 9  Pain Type: Surgical pain  Pain Location: Foot  Pain Orientation: Left      Social/Functional History  Social/Functional History  Lives With: Spouse (spouse works full-time)  Type of Home: [employer]  Home Layout: One level  Home Access: Stairs to enter with rails  Entrance Stairs - Number of Steps: 2  Bathroom Shower/Tub: Tub/Shower unit  Bathroom Toilet: Standard  ADL Assistance: Independent  Homemaking Assistance: Independent  Ambulation Assistance: Independent  Transfer Assistance: Independent  Active : Yes  Occupation: Full time employment  Type of occupation:  at [address] [place] [address] [place]       Objective   Vision: Within Functional Limits  Hearing: Within functional limits    Orientation  Overall Orientation Status: Within Normal Limits  Observation/Palpation  Posture: Good       Balance  Sitting Balance: Independent  Standing Balance: Supervision (with cane)  Standing Balance  Sit to stand: Supervision  Stand to sit: Supervision  Functional Mobility  Functional - Mobility Device: Cane  Assist Level: Stand by assistance  Functional Mobility Comments: 30 ft; mildly antalgic but safe/steady throughout (see PT note for full gait assessment)       ADL  Feeding: Independent  Grooming: Stand by assistance;Setup (able to complete in standing at sink-should complete seated at this time to minimize weight through Lt LE)  UE Bathing: Stand by assistance;Setup  LE Bathing: Stand by assistance;Setup  UE Dressing: Stand by assistance;Setup (for donning T shirt EOB)  LE Dressing: Minimal assistance;Setup (for fastening of post-op shoe around wound vac, able to don Rt sock EOB SBA in \"Figure 4 position\" )  Toileting: Stand by assistance;Setup       Tone RUE  RUE Tone: Normotonic  Tone LUE  LUE Tone: Normotonic  Coordination  Movements Are Fluid And Coordinated: Yes        Bed mobility  Supine to Sit: Independent  Sit to Supine: Independent  Transfers  Sit to stand: Supervision  Stand to sit: Supervision        Cognition  Overall Cognitive Status: WNL           Sensation  Overall Sensation Status: Impaired  Light Touch: Partial deficits in the RLE;Partial deficits in the LLE  LUE AROM (degrees)  LUE AROM : WNL  RUE AROM (degrees)  RUE AROM : WNL  LUE Strength  Gross LUE Strength: WNL  L Hand Grasp: 5/5  LUE Strength Comment: 5/5 MMT all major muscle groups  RUE Strength  Gross RUE Strength: WNL  R Hand Grasp: 5/5  RUE Strength Comment: 5/5 MMT all major muscle groups          Self Care Training:   Cues were given for safety, sequence, UE/LE placement, visual cues, and balance. Activities performed today included UB/LB dressing and grooming tasks  Therapeutic Activity Training:   Therapeutic activity training was instructed today. Cues were given for safety, sequence, UE/LE placement, awareness, and balance. Activities performed today included bed mobility training, transfer training, HH mobility with cane, education on wear of post-op shoe and d/c planning    AM-PAC 6 click short form for inpatient daily activity:   How much help from another person does the patient currently need. .. Unable  Dep A Lot  Max A A Lot   Mod A A Little  Min A A Little   CGA  SBA None   Mod I  Indep  Sup   1. Putting on and taking off regular lower body clothing? [] 1    [] 2   [] 2   [x] 3   [] 3   [] 4      2. Bathing (including washing, rinsing, drying)? [] 1   [] 2   [] 2 [] 3 [] 3 [x] 4   3. Toileting, which includes using toilet, bedpan, or urinal? [] 1    [] 2   [] 2   [] 3   [] 3   [x] 4     4. Putting on and taking off regular upper body clothing? [] 1   [] 2   [] 2   [] 3   [] 3    [x] 4      5.  Taking care of personal grooming such as

## 2018-09-19 NOTE — CONSULTS
[facility] Continence Nurse  Consult Note       Pedro Alvarado  AGE: 37 y.o. GENDER: male  : 1975  TODAY'S DATE:  2018    Subjective:     Reason for [address] [facility] Evaluation and Assessment: wound vac placement      Pedro Alvarado is a 37 y.o. male referred by:   [] Physician  [] Nursing  [] Other:     Wound Identification:  Wound Type: diabetic  Contributing Factors: diabetes        PAST MEDICAL HISTORY        Diagnosis Date    Abscess     scrotal    Anxiety associated with depression     Back pain 2012    Chest pain 2013    Diabetic nephropathy ([address]     Diabetic neuropathy ([address] 2011    Diabetic ulcer of left midfoot associated with type 2 diabetes mellitus, with fat layer exposed ([address] 2017    Diverticulosis     C scope + Dr. Betty Conner DM (diabetes mellitus), type 2 ([address]     DR. Turner podiatry    Hyperlipidemia LDL goal < 100 7/15/2013    Hypertension     Internal hemorrhoid     C scope + Dr. Madison Sidvirginia fracture 1988    Panic attacks     Pericarditis     Hospitalized with s/p heart cath normal    Peripheral autonomic neuropathy due to diabetes mellitus ([address]     Axonal EMG- NCS, 2011    Sebaceous cyst 2011    URI (upper respiratory infection) 2012    WD-Wound, surgical, infected, initial encounter 2017    Wrist fracture 1986       PAST SURGICAL HISTORY    Past Surgical History:   Procedure Laterality Date   [address] 2013    Normal (Dr. Ruth Maddox)    COLONOSCOPY  2011    Pandiverticulosis, Nonbleeding internal hemorrhoids, Repeat colonoscopy at age 48- Dr. Isaak Barroso    \"had reconstruction surgery on nose a year after the other nose surgery\"    OTHER SURGICAL HISTORY Right 2015    I & D right great toe with partial amputation    OTHER SURGICAL HISTORY  2017    inc and drainage of abcess    SKIN BIOPSY N/A [id number]    sebaceous cyst removal times 4  TOE AMPUTATION      right great toe    TONSILLECTOMY AND ADENOIDECTOMY  1988    UPPER GASTROINTESTINAL ENDOSCOPY  06/2/2011    Mild gastritis with moderatley severe antritis, small hiatal hernia, Dr. Hemalatha Bettencourt History   Problem Relation Age of Onset   Winsome Martínez Cancer Mother         ?site   Winsome Martínez Stroke Mother     Bleeding Prob Mother     Diabetes Father     Heart Disease Father     High Blood Pressure Father     Obesity Father     Kidney Disease Father     Diabetes Sister     High Blood Pressure Sister     Mental Illness Sister     Obesity Sister     High Blood Pressure Other     Mental Illness Other         bipolar    Other Son         cyst in ear canal    ADHD Daughter        SOCIAL HISTORY    Social History   Substance Use Topics    Smoking status: Former Smoker     Years: 20.00     Quit date: 11/18/2017    Smokeless tobacco: Never Used    Alcohol use No      Comment: 4 cans soda daily, 2 cups tea daily/alchohol- average one drink per month       ALLERGIES    Allergies   Allergen Reactions    Adhesive Tape Rash    Doxycycline Nausea And Vomiting       MEDICATIONS    No current facility-administered medications on file prior to encounter.       Current Outpatient Prescriptions on File Prior to Encounter   Medication Sig Dispense Refill    naproxen (NAPROSYN) 500 MG tablet Take 1 tablet by mouth 2 times daily as needed for Pain 30 tablet 0    insulin glargine (LANTUS) 100 UNIT/ML injection vial Inject 60 Units into the skin nightly 1 vial 3    insulin lispro (HUMALOG) 100 UNIT/ML injection vial Inject 30 Units into the skin 3 times daily (with meals) 5 vial 0    promethazine (PHENERGAN) 25 MG tablet Take 25 mg by mouth every 6 hours as needed for Nausea      tiZANidine (ZANAFLEX) 4 MG tablet Take 4 mg by mouth every 6 hours as needed           Objective:      /63   Pulse 83   Temp 97.6 °F (36.4 °C) (Oral)   Resp 18   Ht 5' 10\" (1.778 m)   Wt 259 lb 6.4 oz (117.7 kg)   SpO2 99%   BMI 37.22 kg/m²   Pradeep Risk Score: Pradeep Scale Score: 21    LABS    CBC:   Lab Results   Component Value Date    WBC 8.4 09/19/2018    RBC 3.33 09/19/2018    HGB 10.2 09/19/2018    HCT 30.3 09/19/2018    MCV 91.0 09/19/2018    MCH 30.6 09/19/2018    MCHC 33.7 09/19/2018    RDW 11.9 09/19/2018     09/19/2018    MPV 9.8 09/19/2018     CMP:    Lab Results   Component Value Date     09/17/2018    K 3.9 09/17/2018    CL 98 09/17/2018    CO2 24 09/17/2018    BUN 15 09/17/2018    CREATININE 1.1 09/17/2018    GFRAA >60 09/17/2018    LABGLOM >60 09/17/2018    GLUCOSE 204 09/17/2018    PROT 6.7 09/15/2018    PROT 7.1 04/11/2012    LABALBU 3.3 09/15/2018    CALCIUM 8.3 09/17/2018    BILITOT 0.8 09/15/2018    ALKPHOS 80 09/15/2018    AST 15 09/15/2018    ALT 13 09/15/2018     Albumin:    Lab Results   Component Value Date    LABALBU 3.3 09/15/2018     PT/INR:    Lab Results   Component Value Date    PROTIME 13.8 09/15/2018    PROTIME 12.5 12/13/2010    INR 1.21 09/15/2018     HgBA1c:    Lab Results   Component Value Date    LABA1C 10.3 09/16/2018         Assessment:     Patient Active Problem List   Diagnosis    Hiatal hernia    Diabetes mellitus type 2, improved controlled    Diabetic neuropathy ([address]    Panic attack    Anxiety associated with depression    Neck pain    DANIELA (obstructive sleep apnea)    Urinary frequency    Bilateral carpal tunnel syndrome- left worse than right    Hyperlipidemia with target LDL less than 100    HTN, goal below 140/90    Bronchitis    Osteomyelitis ([address]    WD-Diabetic ulcer of left midfoot associated with type 2 diabetes mellitus, with fat layer exposed ([address]    Abscess    Periumbilical hernia    WD-Wound, surgical, infected, initial encounter    Sepsis ([address]       Measurements:  Wound 07/18/17 Other (Comment) WOUND #2 LEFT PLANTAR (ONSET 3 MTHS) (Active)   Wound Type Wound 9/19/2018  9:15 AM   Wound Diabetic 9/19/2018  9:15 AM Changed Changed/New 9/19/2018  9:15 AM   Dressing/Treatment Vacuum dressing 9/19/2018  9:15 AM   Wound Cleansed Rinsed/Irrigated with saline 9/19/2018  9:15 AM   Dressing Change Due 09/21/18 9/19/2018  9:15 AM   Wound Length (cm) 5.5 cm 9/19/2018  9:15 AM   Wound Width (cm) 3.6 cm 9/19/2018  9:15 AM   Wound Depth (cm)  0.7 9/19/2018  9:15 AM   Calculated Wound Size (cm^2) (l*w) 19.8 cm^2 9/19/2018  9:15 AM   Change in Wound Size % (l*w) -500 9/19/2018  9:15 AM   Distance Tunneling (cm) 0 cm 9/19/2018  9:15 AM   Tunneling Position ___ O'Clock 0 9/19/2018  9:15 AM   Undermining Starts ___ O'Clock 0200 9/19/2018  9:15 AM   Undermining Ends___ O'Clock 0900 9/19/2018  9:15 AM   Undermining Maxium Distance (cm) 1.6 9/19/2018  9:15 AM   Wound Assessment Red; White 9/19/2018  9:15 AM   Drainage Amount Moderate 9/19/2018  9:15 AM   Drainage Description Serosanguinous 9/19/2018  9:15 AM   Odor None 9/19/2018  9:15 AM   Margins Defined edges 9/19/2018  9:15 AM   Exposed structure Tendon 9/19/2018  9:15 AM   Shazia-wound Assessment Dark edges;Fragile 9/19/2018  9:15 AM   [place]%Wound Bed 0 9/19/2018  9:15 AM   Red%Wound Bed 20 9/19/2018  9:15 AM   Yellow%Wound Bed 0 9/19/2018  9:15 AM   Black%Wound Bed 0 9/19/2018  9:15 AM   Purple%Wound Bed 0 9/19/2018  9:15 AM   Other%Wound Bed 80 9/19/2018  9:15 AM   Number of days: 1       Incision 05/22/15 Foot Right (Active)   Number of days: 1215       Incision 12/04/17 Abdomen Mid (Active)   Number of days: 288       Response to treatment:  Well tolerated by patient.      Pain Assessment:  Severity:  8/10  Quality of pain: raw  Wound Pain Timing/Severity: constant to left ankle  Premedicated: yes    Plan:     Plan of Care: Wound 07/18/17 Other (Comment) WOUND #2 LEFT PLANTAR (ONSET 3 MTHS)-Dressing/Treatment: Vacuum dressing  [REMOVED] Wound 09/17/18 Left 4th toe-Dressing/Treatment: Open to air  Wound 09/17/18 Left dorsal foot and 4th toe amp site 9/19/18-Dressing/Treatment: Vacuum

## 2018-09-19 NOTE — PROGRESS NOTES
percent impaired, limited or restricted  Mobility: Walking and Moving Around Goal Status (): 0 percent impaired, limited or restricted    AM-PAC Score  AM-PAC Inpatient Mobility Raw Score : 22  AM-PAC Inpatient T-Scale Score : 53.28  Mobility Inpatient CMS 0-100% Score: 20.91  Mobility Inpatient CMS G-Code Modifier : CJ          Goals  Long term goals  Long term goal 1: In one week, pt will ambulate 100 feet with modified independence with LRAD  Long term goal 2: In one week, pt will ascend/descend 2 steps with a handrail with supervision  Long term goal 3:  In one week, pt will independently complete 3 sets of 10 reps of BLE AROM exercises in available and allowed ROM       Time In: 1023  Time Out: 1103  Total Treatment Time: 40  Timed Code Treatment Minutes: [address] [facility] Suze Dorantes PT, DPT  License #: [id number]

## 2018-09-20 PROBLEM — L97.429 DIABETIC ULCER OF LEFT MIDFOOT (HCC): Status: ACTIVE | Noted: 2017-07-18

## 2018-09-20 LAB
BUN BLDV-MCNC: 12 MG/DL (ref 6–23)
CREAT SERPL-MCNC: 1.1 MG/DL (ref 0.9–1.3)
CULTURE: NORMAL
GFR AFRICAN AMERICAN: >60 ML/MIN/1.73M2
GFR NON-AFRICAN AMERICAN: >60 ML/MIN/1.73M2
GLUCOSE BLD-MCNC: 125 MG/DL (ref 70–99)
GLUCOSE BLD-MCNC: 125 MG/DL (ref 70–99)
GLUCOSE BLD-MCNC: 126 MG/DL (ref 70–99)
GLUCOSE BLD-MCNC: 149 MG/DL (ref 70–99)
GLUCOSE BLD-MCNC: 215 MG/DL (ref 70–99)
Lab: NORMAL
REPORT STATUS: NORMAL
SPECIMEN: NORMAL

## 2018-09-20 PROCEDURE — 82565 ASSAY OF CREATININE: CPT

## 2018-09-20 PROCEDURE — 82962 GLUCOSE BLOOD TEST: CPT

## 2018-09-20 PROCEDURE — 84520 ASSAY OF UREA NITROGEN: CPT

## 2018-09-20 PROCEDURE — 9990 CHARGE CONVERSION

## 2018-09-20 PROCEDURE — 36415 COLL VENOUS BLD VENIPUNCTURE: CPT

## 2018-09-20 PROCEDURE — 97110 THERAPEUTIC EXERCISES: CPT

## 2018-09-20 PROCEDURE — 97530 THERAPEUTIC ACTIVITIES: CPT

## 2018-09-20 PROCEDURE — 99233 SBSQ HOSP IP/OBS HIGH 50: CPT | Performed by: INTERNAL MEDICINE

## 2018-09-20 PROCEDURE — 97116 GAIT TRAINING THERAPY: CPT

## 2018-09-20 RX ORDER — LIDOCAINE HYDROCHLORIDE 10 MG/ML
5 INJECTION, SOLUTION EPIDURAL; INFILTRATION; INTRACAUDAL; PERINEURAL ONCE
Status: COMPLETED | OUTPATIENT
Start: 2018-09-20 | End: 2018-09-21

## 2018-09-20 RX ORDER — SODIUM CHLORIDE 0.9 % (FLUSH) 0.9 %
10 SYRINGE (ML) INJECTION PRN
Status: DISCONTINUED | OUTPATIENT
Start: 2018-09-20 | End: 2018-09-23 | Stop reason: HOSPADM

## 2018-09-20 RX ORDER — POLYETHYLENE GLYCOL 3350 17 G/17G
17 POWDER, FOR SOLUTION ORAL DAILY
Status: DISCONTINUED | OUTPATIENT
Start: 2018-09-20 | End: 2018-09-23 | Stop reason: HOSPADM

## 2018-09-20 RX ORDER — SODIUM CHLORIDE 0.9 % (FLUSH) 0.9 %
10 SYRINGE (ML) INJECTION EVERY 12 HOURS SCHEDULED
Status: DISCONTINUED | OUTPATIENT
Start: 2018-09-20 | End: 2018-09-23 | Stop reason: HOSPADM

## 2018-09-20 RX ADMIN — INSULIN LISPRO 4 UNITS: 100 INJECTION, SOLUTION INTRAVENOUS; SUBCUTANEOUS at 12:18

## 2018-09-20 RX ADMIN — Medication 4 MG: at 09:41

## 2018-09-20 RX ADMIN — CEFTRIAXONE SODIUM 2 G: 2 INJECTION, POWDER, FOR SOLUTION INTRAMUSCULAR; INTRAVENOUS at 21:00

## 2018-09-20 RX ADMIN — Medication 4 MG: at 15:51

## 2018-09-20 RX ADMIN — METRONIDAZOLE 500 MG: 500 INJECTION, SOLUTION INTRAVENOUS at 17:40

## 2018-09-20 RX ADMIN — INSULIN LISPRO 20 UNITS: 100 INJECTION, SOLUTION INTRAVENOUS; SUBCUTANEOUS at 09:41

## 2018-09-20 RX ADMIN — POLYETHYLENE GLYCOL 3350 17 G: 17 POWDER, FOR SOLUTION ORAL at 12:18

## 2018-09-20 RX ADMIN — INSULIN LISPRO 8 UNITS: 100 INJECTION, SOLUTION INTRAVENOUS; SUBCUTANEOUS at 21:04

## 2018-09-20 RX ADMIN — INSULIN GLARGINE 70 UNITS: 100 INJECTION, SOLUTION SUBCUTANEOUS at 21:04

## 2018-09-20 RX ADMIN — METRONIDAZOLE 500 MG: 500 INJECTION, SOLUTION INTRAVENOUS at 09:41

## 2018-09-20 RX ADMIN — SODIUM CHLORIDE: 9 INJECTION, SOLUTION INTRAVENOUS at 02:27

## 2018-09-20 RX ADMIN — MORPHINE SULFATE 4 MG: 4 INJECTION INTRAVENOUS at 02:36

## 2018-09-20 RX ADMIN — METRONIDAZOLE 500 MG: 500 INJECTION, SOLUTION INTRAVENOUS at 02:36

## 2018-09-20 RX ADMIN — LINAGLIPTIN 5 MG: 5 TABLET, FILM COATED ORAL at 09:41

## 2018-09-20 RX ADMIN — HYDRALAZINE HYDROCHLORIDE 10 MG: 20 INJECTION INTRAMUSCULAR; INTRAVENOUS at 22:46

## 2018-09-20 RX ADMIN — Medication 4 MG: at 21:01

## 2018-09-20 RX ADMIN — MORPHINE SULFATE 4 MG: 4 INJECTION INTRAVENOUS at 21:01

## 2018-09-20 RX ADMIN — OXYCODONE HYDROCHLORIDE AND ACETAMINOPHEN 1 TABLET: 7.5; 325 TABLET ORAL at 15:51

## 2018-09-20 RX ADMIN — ASPIRIN 81 MG 81 MG: 81 TABLET ORAL at 09:41

## 2018-09-20 RX ADMIN — MORPHINE SULFATE 4 MG: 4 INJECTION INTRAVENOUS at 12:27

## 2018-09-20 RX ADMIN — INSULIN LISPRO 20 UNITS: 100 INJECTION, SOLUTION INTRAVENOUS; SUBCUTANEOUS at 12:19

## 2018-09-20 RX ADMIN — OXYCODONE HYDROCHLORIDE AND ACETAMINOPHEN 1 TABLET: 7.5; 325 TABLET ORAL at 07:43

## 2018-09-20 ASSESSMENT — PAIN SCALES - GENERAL
PAINLEVEL_OUTOF10: 8

## 2018-09-20 ASSESSMENT — PAIN DESCRIPTION - ONSET: ONSET: ON-GOING

## 2018-09-20 ASSESSMENT — PAIN DESCRIPTION - ORIENTATION: ORIENTATION: LEFT

## 2018-09-20 ASSESSMENT — PAIN DESCRIPTION - DESCRIPTORS: DESCRIPTORS: ACHING

## 2018-09-20 ASSESSMENT — PAIN DESCRIPTION - FREQUENCY: FREQUENCY: CONTINUOUS

## 2018-09-20 ASSESSMENT — PAIN DESCRIPTION - PAIN TYPE: TYPE: SURGICAL PAIN

## 2018-09-20 ASSESSMENT — PAIN DESCRIPTION - LOCATION: LOCATION: FOOT

## 2018-09-20 NOTE — PROGRESS NOTES
Hospitalist Progress Note      Name:  Juana Shannon /Age/Sex: 1975  (37 y.o. male)   MRN & CSN:  [id number] & [id number] Admission Date/Time: 9/15/2018  1:06 PM   Location:  [address] [place] [address] PCP: Silvino Whipple MD         Hospital Day: 6    Assessment and Plan:   Juana Shannon is a 37 y.o.  male  who presents with <principal problem not specified>    1-Sepsis due to left foot abscess with tachycardia and leukocytosis on presentation which has improved. Blood culture negative so far. 2-Left foot abscess- s/p debridement and amputation of left 4th toe, but MRI also concerns of early osteomyelitis on 3rd toe- consulted ID, input noted and appreciated, will get PICCLINE and potential discharge in am with home IV abx. .    Other chronic conditions:  · DM2, uncontrolled with A1C 10.3. SSI. · Anxiety, depression, panic attacks - stable  · HLD, stable  · HTN, stable        Diet Dietary Nutrition Supplements: Wound Healing Oral Supplement  DIET CARB CONTROL;   DVT Prophylaxis [x] Lovenox, []  Heparin, [] SCDs, []No VTE prophylaxis, patient ambulating   GI Prophylaxis [x] PPI, [] H2 Blocker, [] No GI prophylaxis, patient is receiving diet/Tube Feeds   Code Status Full Code   Disposition Patient requires continued admission due to iv abx. Will DC to home when able. Will need HHC and wound clinic f/u   MDM [] Low, [x] Moderate,[]  High  Patient's risk as above due to two or more stable chronic illnesses      History of Present Illness:   Feels better today, less pain, no fever/chills. No nausea/vomiting. 10-14 point ROS reviewed negative, unless as noted above    Objective:        Intake/Output Summary (Last 24 hours) at 18 1413  Last data filed at 18 0952   Gross per 24 hour   Intake          2718.33 ml   Output             1375 ml   Net          1343.33 ml      Vitals:   Vitals:    18 1006   BP: 124/76   Pulse: 73   Resp: 18   Temp: 97.6 °F (36.4 °C)   SpO2: 98%     Physical Exam: * 50-74% stenosis   involving the proximal left peroneal artery.  There is no evidence of   occlusion.          Electronically signed by Debbie Dominguez MD on 9/20/2018 at 2:13 PM

## 2018-09-20 NOTE — PROGRESS NOTES
Infectious Disease Progress Note  2018   Patient Name: Elias Reed : 1975   Impression  · Left foot diabetic ulcer complicated by abscess, osteomyelitis of the fourth digit and cellulitis  ? Status post left foot incision and drainage, amputation of fourth digit on   ? Polymicrobial: Viridans group streptococcus  ? Improving pain  · Poorly controlled diabetes mellitus  · Multi-morbidity: per PMHx  Plan:  · Continue ceftriaxone and metronidazole  · Need 6 weeks of IV antibiotics, and then transition to oral for at least another six weeks, duration will be guided by clinical course  · Follow-up bone culture( if done) and histopathology report. Ongoing Antimicrobial Therapy  Ceftriaxone   Metronidazole   ? Completed Antimicrobial Therapy  Vancomycin 9/15-  Pip-tazo 9/15-  ? History:? Interval history noted  Feels better, less pain    Physical Exam:  Vital Signs: /76   Pulse 73   Temp 97.6 °F (36.4 °C) (Oral)   Resp 18   Ht 5' 10\" (1.778 m)   Wt 259 lb 6.4 oz (117.7 kg)   SpO2 98%   BMI 37.22 kg/m²     Gen: alert and oriented X3, no distress  Skin: no stigmata of endocarditis  Wounds: Wound VAC on the left foot  HEMT: AT/NC Oropharynx pink, moist, and without lesions or exudates; dentition in good state of repair  Eyes: PERRLA, EOMI, conjunctiva pink, sclera anicteric. Neck: Supple. Trachea midline. No LAD. Chest: no distress and CTA. Good air movement. Heart: RRR and no MRG. Abd: soft, non-distended, no tenderness, no hepatomegaly. Normoactive bowel sounds. Ext: no clubbing, cyanosis, or edema  Catheter Site: without erythema or tenderness  Neuro: Mental status intact.  CN 2-12 intact and no focal sensory or motor deficits     Radiologic / Imaging / TESTING  MRI left foot     Labs:    Recent Results (from the past 24 hour(s))   POCT Glucose    Collection Time: 18  6:34 PM   Result Value Ref Range    POC Glucose 199 (H) 70 - 99 MG/DL   High sensitivity CRP Electronically signed by Octaviano Nichols MD on 9/20/2018 at 2:41 PM

## 2018-09-20 NOTE — CARE COORDINATION
Plan remains home with [employer] (which is approved) and IV atb x 6 weeks. Called/faxed info to Tim Espinosa at [facility] and Esther KUMAR setting up atb through [facility].

## 2018-09-20 NOTE — PROGRESS NOTES
Independent  Ambulation Assistance: Independent  Transfer Assistance: Independent  Active : Yes  Occupation: Full time employment  Type of occupation:  at [address]: [employer]     Long term goals  Long term goal 1: In one week, pt will ambulate 100 feet with modified independence with LRAD  Long term goal 2: In one week, pt will ascend/descend 2 steps with a handrail with supervision  Long term goal 3:  In one week, pt will independently complete 3 sets of 10 reps of BLE AROM exercises in available and allowed ROM    Electronically signed by:    Umm Ibrahim PTA  9/20/2018, 8:24 AM

## 2018-09-20 NOTE — PROGRESS NOTES
Progress Note( Dr. Lubna Bernabe)  9/20/2018  Subjective:   Admit Date: 9/15/2018  PCP: Malena Don MD    Admitted For : Left Foot ulcer     Consulted For: better control of DM     Interval History: feeks a bit better . Had left 4 th toe amputated , and debrindment of the left foot wound     Denies any chest pains,   Denies SOB . Denies nausea or vomiting. No new bowel or bladder symptoms. Intake/Output Summary (Last 24 hours) at 09/20/18 0800  Last data filed at 09/20/18 0601   Gross per 24 hour   Intake          3038.33 ml   Output              900 ml   Net          2138.33 ml       DATA    CBC:   Recent Labs      09/17/18   0842  09/19/18   0701   WBC  12.7*  8.4   HGB  11.7*  10.2*   PLT  232  227    CMP:  Recent Labs      09/17/18   0842  09/20/18   0430   NA  135   --    K  3.9   --    CL  98*   --    CO2  24   --    BUN  15  12   CREATININE  1.1  1.1   CALCIUM  8.3   --      Lipids:   Lab Results   Component Value Date    CHOL 173 07/24/2017    CHOL 168 10/15/2013    HDL 35 07/24/2017    TRIG 231 07/24/2017     Glucose:  Recent Labs      09/19/18   1834  09/19/18   2110  09/20/18   0230   POCGLU  199*  231*  125*     [id number]:  Lab Results   Component Value Date    LABA1C 10.3 09/16/2018     High Sensitivity TSH:   Lab Results   Component Value Date    TSHHS 2.300 07/24/2017     Free T3: No results found for: FT3  Free T4:  Lab Results   Component Value Date    T4FREE 1.25 07/24/2017       Xr Foot Left (min 3 Views)    Result Date: 9/15/2018  EXAMINATION: 3 XRAY VIEWS OF THE LEFT FOOT 9/15/2018 11:23 am COMPARISON: 05/24/2018 HISTORY: Chronic left foot wound. FINDINGS: No evidence of fracture or dislocation. Mild soft tissue swelling along the dorsum. No erosive changes seen. Mild Achilles and plantar enthesopathy. No radiographic evidence for osteomyelitis. Nonspecific mild soft tissue swelling.      Xr Chest Portable    Result Date: 9/15/2018  EXAMINATION: SINGLE XRAY VIEW OF THE CHEST 9/15/2018 11:23 am COMPARISON: 08/21/2015. No acute process. Vl Dup Lower Extremity Arteries Bilateral    Result Date: 9/17/2018  EXAMINATION: ARTERIAL DUPLEX ULTRASOUND OF THE BILATERAL LOWER EXTREMITIES  9/16/2018 9:02 pm TECHNIQUE: Duplex ultrasound and Doppler images were obtained of the bilateral lower extremities       1. Triphasic waveforms are seen throughout the right lower extremity without significant stenosis or occlusion. 2. Biphasic waveforms are seen throughout the left lower extremity suggestive for atherosclerotic disease. There is approximately 50-74% stenosis involving the proximal left peroneal artery. There is no evidence of occlusion. Vl Dup Lower Extremity Venous Left    Result Date: 9/15/2018  EXAMINATION: DUPLEX VENOUS ULTRASOUND OF THE LEFT LOWER EXTREMITY 9/15/2018 11:18 am TECHNIQUE: Duplex ultrasound and Doppler images were obtained of the left lower extremity. .     No evidence of DVT in the left lower extremity.        Scheduled Medicines   Medications:    gabapentin  200 mg Oral TID    metroNIDAZOLE  500 mg Intravenous Q8H    cefTRIAXone (ROCEPHIN) IV  2 g Intravenous Q24H    insulin lispro  20 Units Subcutaneous TID WC    insulin lispro  0-25 Units Subcutaneous TID WC    insulin lispro  0-25 Units Subcutaneous 2 times per day    aspirin  81 mg Oral Daily    insulin glargine  70 Units Subcutaneous Nightly    linagliptin  5 mg Oral Daily    sodium chloride flush  10 mL Intravenous 2 times per day    enoxaparin  40 mg Subcutaneous Daily      Infusions:    sodium chloride 50 mL/hr at 09/20/18 0227    dextrose           Objective:   Vitals: /64   Pulse 64   Temp 97.2 °F (36.2 °C) (Oral)   Resp 16   Ht 5' 10\" (1.778 m)   Wt 259 lb 6.4 oz (117.7 kg)   SpO2 94%   BMI 37.22 kg/m²   General appearance: alert and cooperative with exam  Neck: no JVD or bruit  Thyroid : Normal lobes   Lungs: Has Vesicular Breath sounds   Heart:  regular rate and

## 2018-09-21 LAB
BASOPHILS ABSOLUTE: 0.1 K/CU MM
BASOPHILS RELATIVE PERCENT: 0.7 % (ref 0–1)
CULTURE: NORMAL
DIFFERENTIAL TYPE: ABNORMAL
EOSINOPHILS ABSOLUTE: 0.3 K/CU MM
EOSINOPHILS RELATIVE PERCENT: 3.7 % (ref 0–3)
GLUCOSE BLD-MCNC: 153 MG/DL (ref 70–99)
GLUCOSE BLD-MCNC: 179 MG/DL (ref 70–99)
GLUCOSE BLD-MCNC: 189 MG/DL (ref 70–99)
GLUCOSE BLD-MCNC: 200 MG/DL (ref 70–99)
GLUCOSE BLD-MCNC: 213 MG/DL (ref 70–99)
GLUCOSE BLD-MCNC: 219 MG/DL (ref 70–99)
GRAM SMEAR: NORMAL
HCT VFR BLD CALC: 33.5 % (ref 42–52)
HEMOGLOBIN: 11.1 GM/DL (ref 13.5–18)
IMMATURE NEUTROPHIL %: 1.4 % (ref 0–0.43)
LYMPHOCYTES ABSOLUTE: 1.6 K/CU MM
LYMPHOCYTES RELATIVE PERCENT: 19.4 % (ref 24–44)
Lab: NORMAL
Lab: NORMAL
MCH RBC QN AUTO: 29.7 PG (ref 27–31)
MCHC RBC AUTO-ENTMCNC: 33.1 % (ref 32–36)
MCV RBC AUTO: 89.6 FL (ref 78–100)
MONOCYTES ABSOLUTE: 1 K/CU MM
MONOCYTES RELATIVE PERCENT: 12.1 % (ref 0–4)
NUCLEATED RBC %: 0 %
PDW BLD-RTO: 12 % (ref 11.7–14.9)
PLATELET # BLD: 307 K/CU MM (ref 140–440)
PMV BLD AUTO: 9 FL (ref 7.5–11.1)
RBC # BLD: 3.74 M/CU MM (ref 4.6–6.2)
REPORT STATUS: NORMAL
REPORT STATUS: NORMAL
SEGMENTED NEUTROPHILS ABSOLUTE COUNT: 5 K/CU MM
SEGMENTED NEUTROPHILS RELATIVE PERCENT: 62.7 % (ref 36–66)
SPECIMEN: NORMAL
SPECIMEN: NORMAL
TOTAL IMMATURE NEUTOROPHIL: 0.11 K/CU MM
TOTAL NUCLEATED RBC: 0 K/CU MM
WBC # BLD: 8 K/CU MM (ref 4–10.5)

## 2018-09-21 PROCEDURE — 85025 COMPLETE CBC W/AUTO DIFF WBC: CPT

## 2018-09-21 PROCEDURE — 02HV33Z INSERTION OF INFUSION DEVICE INTO SUPERIOR VENA CAVA, PERCUTANEOUS APPROACH: ICD-10-PCS | Performed by: HOSPITALIST

## 2018-09-21 PROCEDURE — 99232 SBSQ HOSP IP/OBS MODERATE 35: CPT | Performed by: INTERNAL MEDICINE

## 2018-09-21 PROCEDURE — 99211 OFF/OP EST MAY X REQ PHY/QHP: CPT

## 2018-09-21 PROCEDURE — 82962 GLUCOSE BLOOD TEST: CPT

## 2018-09-21 PROCEDURE — 9990 CHARGE CONVERSION

## 2018-09-21 PROCEDURE — 36569 INSJ PICC 5 YR+ W/O IMAGING: CPT

## 2018-09-21 PROCEDURE — 36415 COLL VENOUS BLD VENIPUNCTURE: CPT

## 2018-09-21 PROCEDURE — 76937 US GUIDE VASCULAR ACCESS: CPT

## 2018-09-21 RX ORDER — ASPIRIN 81 MG/1
81 TABLET, CHEWABLE ORAL DAILY
Qty: 30 TABLET | Refills: 3 | Status: ON HOLD | OUTPATIENT
Start: 2018-09-22 | End: 2021-04-04 | Stop reason: ALTCHOICE

## 2018-09-21 RX ORDER — METRONIDAZOLE 500 MG/1
500 TABLET ORAL 3 TIMES DAILY
Qty: 123 TABLET | Refills: 0 | Status: ON HOLD | OUTPATIENT
Start: 2018-09-21 | End: 2018-10-08 | Stop reason: HOSPADM

## 2018-09-21 RX ORDER — LANCETS 30 GAUGE
1 EACH MISCELLANEOUS DAILY
Qty: 100 EACH | Refills: 3 | Status: SHIPPED | OUTPATIENT
Start: 2018-09-21

## 2018-09-21 RX ORDER — SODIUM CHLORIDE 9 MG/ML
INJECTION, SOLUTION INTRAVENOUS CONTINUOUS
Status: DISCONTINUED | OUTPATIENT
Start: 2018-09-21 | End: 2018-09-22

## 2018-09-21 RX ORDER — BLOOD-GLUCOSE METER
KIT MISCELLANEOUS
Qty: 1 KIT | Refills: 0 | Status: SHIPPED | OUTPATIENT
Start: 2018-09-21 | End: 2018-10-08

## 2018-09-21 RX ADMIN — LIDOCAINE HYDROCHLORIDE 5 ML: 10 INJECTION, SOLUTION EPIDURAL; INFILTRATION; INTRACAUDAL; PERINEURAL at 08:00

## 2018-09-21 RX ADMIN — METFORMIN HYDROCHLORIDE 850 MG: 850 TABLET ORAL at 16:59

## 2018-09-21 RX ADMIN — INSULIN LISPRO 20 UNITS: 100 INJECTION, SOLUTION INTRAVENOUS; SUBCUTANEOUS at 10:25

## 2018-09-21 RX ADMIN — OXYCODONE HYDROCHLORIDE AND ACETAMINOPHEN 1 TABLET: 7.5; 325 TABLET ORAL at 08:47

## 2018-09-21 RX ADMIN — INSULIN LISPRO 20 UNITS: 100 INJECTION, SOLUTION INTRAVENOUS; SUBCUTANEOUS at 14:38

## 2018-09-21 RX ADMIN — INSULIN LISPRO 8 UNITS: 100 INJECTION, SOLUTION INTRAVENOUS; SUBCUTANEOUS at 22:42

## 2018-09-21 RX ADMIN — ASPIRIN 81 MG 81 MG: 81 TABLET ORAL at 08:48

## 2018-09-21 RX ADMIN — SODIUM CHLORIDE, PRESERVATIVE FREE 10 ML: 5 INJECTION INTRAVENOUS at 22:35

## 2018-09-21 RX ADMIN — SODIUM CHLORIDE, PRESERVATIVE FREE 10 ML: 5 INJECTION INTRAVENOUS at 10:18

## 2018-09-21 RX ADMIN — METRONIDAZOLE 500 MG: 500 INJECTION, SOLUTION INTRAVENOUS at 10:25

## 2018-09-21 RX ADMIN — MORPHINE SULFATE 4 MG: 4 INJECTION INTRAVENOUS at 23:04

## 2018-09-21 RX ADMIN — SODIUM CHLORIDE: 9 INJECTION, SOLUTION INTRAVENOUS at 22:35

## 2018-09-21 RX ADMIN — MORPHINE SULFATE 4 MG: 4 INJECTION INTRAVENOUS at 04:50

## 2018-09-21 RX ADMIN — INSULIN LISPRO 8 UNITS: 100 INJECTION, SOLUTION INTRAVENOUS; SUBCUTANEOUS at 14:32

## 2018-09-21 RX ADMIN — INSULIN LISPRO 4 UNITS: 100 INJECTION, SOLUTION INTRAVENOUS; SUBCUTANEOUS at 10:23

## 2018-09-21 RX ADMIN — METFORMIN HYDROCHLORIDE 850 MG: 850 TABLET ORAL at 08:48

## 2018-09-21 RX ADMIN — MORPHINE SULFATE 4 MG: 4 INJECTION INTRAVENOUS at 10:18

## 2018-09-21 RX ADMIN — OXYCODONE HYDROCHLORIDE AND ACETAMINOPHEN 1 TABLET: 7.5; 325 TABLET ORAL at 00:19

## 2018-09-21 RX ADMIN — METRONIDAZOLE 500 MG: 500 INJECTION, SOLUTION INTRAVENOUS at 01:34

## 2018-09-21 RX ADMIN — METRONIDAZOLE 500 MG: 500 INJECTION, SOLUTION INTRAVENOUS at 19:26

## 2018-09-21 RX ADMIN — INSULIN LISPRO 4 UNITS: 100 INJECTION, SOLUTION INTRAVENOUS; SUBCUTANEOUS at 19:00

## 2018-09-21 RX ADMIN — INSULIN LISPRO 8 UNITS: 100 INJECTION, SOLUTION INTRAVENOUS; SUBCUTANEOUS at 01:40

## 2018-09-21 RX ADMIN — INSULIN LISPRO 20 UNITS: 100 INJECTION, SOLUTION INTRAVENOUS; SUBCUTANEOUS at 19:19

## 2018-09-21 RX ADMIN — LINAGLIPTIN 5 MG: 5 TABLET, FILM COATED ORAL at 08:48

## 2018-09-21 RX ADMIN — INSULIN GLARGINE 70 UNITS: 100 INJECTION, SOLUTION SUBCUTANEOUS at 22:42

## 2018-09-21 RX ADMIN — OXYCODONE HYDROCHLORIDE AND ACETAMINOPHEN 1 TABLET: 7.5; 325 TABLET ORAL at 16:59

## 2018-09-21 RX ADMIN — HYDRALAZINE HYDROCHLORIDE 10 MG: 20 INJECTION INTRAMUSCULAR; INTRAVENOUS at 16:59

## 2018-09-21 RX ADMIN — Medication 4 MG: at 16:59

## 2018-09-21 RX ADMIN — CEFTRIAXONE SODIUM 2 G: 2 INJECTION, POWDER, FOR SOLUTION INTRAMUSCULAR; INTRAVENOUS at 22:35

## 2018-09-21 RX ADMIN — POLYETHYLENE GLYCOL 3350 17 G: 17 POWDER, FOR SOLUTION ORAL at 08:48

## 2018-09-21 ASSESSMENT — PAIN DESCRIPTION - ONSET: ONSET: ON-GOING

## 2018-09-21 ASSESSMENT — PAIN DESCRIPTION - LOCATION: LOCATION: FOOT

## 2018-09-21 ASSESSMENT — PAIN SCALES - GENERAL
PAINLEVEL_OUTOF10: 7
PAINLEVEL_OUTOF10: 7
PAINLEVEL_OUTOF10: 8
PAINLEVEL_OUTOF10: 8
PAINLEVEL_OUTOF10: 7
PAINLEVEL_OUTOF10: 8

## 2018-09-21 ASSESSMENT — PAIN DESCRIPTION - DESCRIPTORS: DESCRIPTORS: ACHING

## 2018-09-21 ASSESSMENT — PAIN DESCRIPTION - FREQUENCY: FREQUENCY: CONTINUOUS

## 2018-09-21 ASSESSMENT — PAIN DESCRIPTION - ORIENTATION: ORIENTATION: LEFT

## 2018-09-21 ASSESSMENT — PAIN DESCRIPTION - PAIN TYPE: TYPE: SURGICAL PAIN

## 2018-09-21 NOTE — PROGRESS NOTES
rhythm  Abdomen: soft, non-tender; bowel sounds normal; no masses,  no organomegaly  Musculoskeletal: Normal  Extremities: extremities normal excepyt left foot ulceration , , has  Edema left 4th toe amputated Added on vacuum  Neurologic:  Awake, alert, oriented to name, place and time. Cranial nerves II-XII are grossly intact. Motor is  intact. Sensory ineuropathy ,  and gait is normal.    Assessment:     Patient Active Problem List:     Hiatal hernia     Diabetes mellitus type 2, improved controlled     Diabetic neuropathy (HCC)     Panic attack     Anxiety associated with depression     Neck pain     DANIELA (obstructive sleep apnea)     Urinary frequency     Bilateral carpal tunnel syndrome- left worse than right     Hyperlipidemia with target LDL less than 100     HTN, goal below 140/90     Bronchitis     Osteomyelitis (HCC)     WD-Diabetic ulcer of left midfoot associated with type 2 diabetes mellitus, with fat layer exposed ([address]     Abscess     Periumbilical hernia     WD-Wound, surgical, infected, initial encounter     Sepsis ([address]    Left 4 th digit was amputated       Plan:     1. Reviewed POC blood glucose . Labs and X ray results   2. Reviewed Current Medicines   3. On meal/ Correction bolus Humalog/ Basal Lantus Insulin regime   4. Monitor Blood glucose frequently   5. Modified  the dose of Insulin/ other medicines as needed   6. No hives on vacuum  7. Will follow     .      Tahir Dennis MD

## 2018-09-21 NOTE — PROGRESS NOTES
[facility] Liaison aware of discharge & will initiate home infusion & confirmed with [facility] they received IV order.

## 2018-09-21 NOTE — DISCHARGE SUMMARY
by mouth every 6 hours as needed for Nausea             tiZANidine (ZANAFLEX) 4 MG tablet  Take 4 mg by mouth every 6 hours as needed                 Objective Findings at Discharge:   BP (!) 116/57   Pulse 79   Temp 98.4 °F (36.9 °C) (Oral)   Resp 16   Ht 5' 10\" (1.778 m)   Wt 253 lb 8 oz (115 kg)   SpO2 97%   BMI 36.37 kg/m²            PHYSICAL EXAM   GEN Awake male, laying in bed in no apparent distress. Appears given age. EYES Pupils are equally round. No scleral discharge  HENT Atraumatic and symmetric head  NECK No apparent thyromegaly  RESP Symmetric chest movement while on room air. CARDIO/VASC Peripheral pulses equal bilaterally and palpable. No peripheral edema. GI Abdomen is not distended. Rectal exam deferred.  Luna catheter is not present. HEME/LYMPH No petechiae or ecchymoses. MSK Spontaneous movement of BL upper extremities  SKIN Normal coloration, warm, dry. NEURO Cranial nerves appear grossly intact  PSYCH Awake, alert.     BMP/CBC  Recent Labs      09/19/18   0701  09/20/18   0430  09/21/18   0646   BUN   --   12   --    CREATININE   --   1.1   --    WBC  8.4   --   8.0   HCT  30.3*   --   33.5*   PLT  227   --   307     SIGNIFICANT IMAGING AND LABS:      Discharge Time of 33 minutes    Electronically signed by Radha Steven MD on 9/21/2018 at 10:21 AM

## 2018-09-21 NOTE — PROGRESS NOTES
Imaging / TESTING  MRI left foot     Labs:    Recent Results (from the past 24 hour(s))   POCT Glucose    Collection Time: 09/20/18  6:29 PM   Result Value Ref Range    POC Glucose 126 (H) 70 - 99 MG/DL   POCT Glucose    Collection Time: 09/20/18  8:59 PM   Result Value Ref Range    POC Glucose 215 (H) 70 - 99 MG/DL   POCT Glucose    Collection Time: 09/21/18  1:38 AM   Result Value Ref Range    POC Glucose 219 (H) 70 - 99 MG/DL   CBC Auto Differential    Collection Time: 09/21/18  6:46 AM   Result Value Ref Range    WBC 8.0 4.0 - 10.5 K/CU MM    RBC 3.74 (L) 4.6 - 6.2 M/CU MM    Hemoglobin 11.1 (L) 13.5 - 18.0 GM/DL    Hematocrit 33.5 (L) 42 - 52 %    MCV 89.6 78 - 100 FL    MCH 29.7 27 - 31 PG    MCHC 33.1 32.0 - 36.0 %    RDW 12.0 11.7 - 14.9 %    Platelets 062 449 - 138 K/CU MM    MPV 9.0 7.5 - 11.1 FL    Differential Type AUTOMATED DIFFERENTIAL     Segs Relative 62.7 36 - 66 %    Lymphocytes % 19.4 (L) 24 - 44 %    Monocytes % 12.1 (H) 0 - 4 %    Eosinophils % 3.7 (H) 0 - 3 %    Basophils % 0.7 0 - 1 %    Segs Absolute 5.0 K/CU MM    Lymphocytes # 1.6 K/CU MM    Monocytes # 1.0 K/CU MM    Eosinophils # 0.3 K/CU MM    Basophils # 0.1 K/CU MM    Nucleated RBC % 0.0 %    Total Nucleated RBC 0.0 K/CU MM    Total Immature Neutrophil 0.11 K/CU MM    Immature Neutrophil % 1.4 (H) 0 - 0.43 %   POCT Glucose    Collection Time: 09/21/18  8:45 AM   Result Value Ref Range    POC Glucose 153 (H) 70 - 99 MG/DL   POCT Glucose    Collection Time: 09/21/18  1:29 PM   Result Value Ref Range    POC Glucose 213 (H) 70 - 99 MG/DL     CULTURE results: Invalid input(s): BLOOD CULTURE,  URINE CULTURE, SURGICAL CULTURE    Diagnosis:  Patient Active Problem List   Diagnosis    Hiatal hernia    Diabetes mellitus type 2, improved controlled    Diabetic neuropathy (HCC)    Panic attack    Anxiety associated with depression    Neck pain    DANIELA (obstructive sleep apnea)    Urinary frequency    Bilateral carpal tunnel syndrome- left worse than right    Hyperlipidemia with target LDL less than 100    HTN, goal below 140/90    Bronchitis    Osteomyelitis ([address]    Diabetic ulcer of left midfoot (HCC)    WD-Diabetic ulcer of left midfoot associated with type 2 diabetes mellitus, with fat layer exposed ([address]    Abscess    Periumbilical hernia    WD-Wound, surgical, infected, initial encounter    Sepsis ([address]    Cellulitis of left foot       Active Problems  Active Problems:    Sepsis ([address]    Cellulitis of left foot  Resolved Problems:    * No resolved hospital problems.  *    Electronically signed by: Electronically signed by Letty Tariq MD on 9/21/2018 at 6:05 PM

## 2018-09-21 NOTE — CONSULTS
[address] Continence Nurse  Consult Note       Curtis Luna  AGE: 37 y.o. GENDER: male  : 1975  TODAY'S DATE:  2018    Subjective:     Reason for [facility] Evaluation and Assessment: wound dressing change - wound vac held for now R/T wound appearance      Curtis Luna is a 37 y.o. male referred by:   [x] Physician  [] Nursing  [] Other:     Wound Identification:  Wound Type: diabetic  Contributing Factors: diabetes        PAST MEDICAL HISTORY        Diagnosis Date    Abscess     scrotal    Anxiety associated with depression     Back pain 2012    Chest pain 2013    Diabetic nephropathy ([address]     Diabetic neuropathy ([address] 2011    Diabetic ulcer of left midfoot associated with type 2 diabetes mellitus, with fat layer exposed ([address] 2017    Diverticulosis     C scope + Dr. Willa Soriano DM (diabetes mellitus), type 2 ([address]     DR. Turner podiatry    Hyperlipidemia LDL goal < 100 7/15/2013    Hypertension     Internal hemorrhoid     C scope + Dr. Davila Low fracture 1988    Panic attacks     Pericarditis     Hospitalized with s/p heart cath normal    Peripheral autonomic neuropathy due to diabetes mellitus ([address]     Axonal EMG- [facility], 2011    Sebaceous cyst 2011    URI (upper respiratory infection) 2012    WD-Wound, surgical, infected, initial encounter 2017    Wrist fracture 1986       PAST SURGICAL HISTORY    Past Surgical History:   Procedure Laterality Date   [address] 2013    Normal (Dr. Valdemar Mercado)    COLONOSCOPY  2011    Pandiverticulosis, Nonbleeding internal hemorrhoids, Repeat colonoscopy at age 48- Dr. Fawn Gr    \"had reconstruction surgery on nose a year after the other nose surgery\"    OTHER SURGICAL HISTORY Right 2015    I & D right great toe with partial amputation    OTHER SURGICAL HISTORY  2017    inc and drainage of abcess    SKIN BIOPSY N/A [id number]    sebaceous cyst removal times 4     TOE AMPUTATION      right great toe    TONSILLECTOMY AND ADENOIDECTOMY  1988    UPPER GASTROINTESTINAL ENDOSCOPY  06/2/2011    Mild gastritis with moderatley severe antritis, small hiatal hernia, Dr. Samira Contreras History   Problem Relation Age of Onset   [facility] Cancer Mother         ?site   [facility] Stroke Mother     Bleeding Prob Mother     Diabetes Father     Heart Disease Father     High Blood Pressure Father     Obesity Father     Kidney Disease Father     Diabetes Sister     High Blood Pressure Sister     Mental Illness Sister     Obesity Sister     High Blood Pressure Other     Mental Illness Other         bipolar    Other Son         cyst in ear canal    ADHD Daughter        SOCIAL HISTORY    Social History   Substance Use Topics    Smoking status: Former Smoker     Years: 20.00     Quit date: 11/18/2017    Smokeless tobacco: Never Used    Alcohol use No      Comment: 4 cans soda daily, 2 cups tea daily/alchohol- average one drink per month       ALLERGIES    Allergies   Allergen Reactions    Adhesive Tape Rash    Doxycycline Nausea And Vomiting       MEDICATIONS    No current facility-administered medications on file prior to encounter. Current Outpatient Prescriptions on File Prior to Encounter   Medication Sig Dispense Refill    promethazine (PHENERGAN) 25 MG tablet Take 25 mg by mouth every 6 hours as needed for Nausea      tiZANidine (ZANAFLEX) 4 MG tablet Take 4 mg by mouth every 6 hours as needed      glucose monitoring kit (FREESTYLE) monitoring kit 1 each by Does not apply route once for 1 dose.  1 kit 0         Objective:      BP (!) 116/57   Pulse 79   Temp 98.4 °F (36.9 °C) (Oral)   Resp 16   Ht 5' 10\" (1.778 m)   Wt 253 lb 8 oz (115 kg)   SpO2 97%   BMI 36.37 kg/m²   Pradeep Risk Score: Pradeep Scale Score: 19    LABS    CBC:   Lab Results   Component Value Date    WBC 8.0 09/21/2018    RBC 3.74 09/21/2018    HGB 11.1 09/21/2018    HCT 33.5 09/21/2018    MCV 89.6 09/21/2018    MCH 29.7 09/21/2018    MCHC 33.1 09/21/2018    RDW 12.0 09/21/2018     09/21/2018    MPV 9.0 09/21/2018     CMP:    Lab Results   Component Value Date     09/17/2018    K 3.9 09/17/2018    CL 98 09/17/2018    CO2 24 09/17/2018    BUN 12 09/20/2018    CREATININE 1.1 09/20/2018    GFRAA >60 09/20/2018    LABGLOM >60 09/20/2018    GLUCOSE 204 09/17/2018    PROT 6.7 09/15/2018    PROT 7.1 04/11/2012    LABALBU 3.3 09/15/2018    CALCIUM 8.3 09/17/2018    BILITOT 0.8 09/15/2018    ALKPHOS 80 09/15/2018    AST 15 09/15/2018    ALT 13 09/15/2018     Albumin:    Lab Results   Component Value Date    LABALBU 3.3 09/15/2018     PT/INR:    Lab Results   Component Value Date    PROTIME 13.8 09/15/2018    PROTIME 12.5 12/13/2010    INR 1.21 09/15/2018     HgBA1c:    Lab Results   Component Value Date    LABA1C 10.3 09/16/2018         Assessment:     Patient Active Problem List   Diagnosis    Hiatal hernia    Diabetes mellitus type 2, improved controlled    Diabetic neuropathy ([address]    Panic attack    Anxiety associated with depression    Neck pain    DANIELA (obstructive sleep apnea)    Urinary frequency    Bilateral carpal tunnel syndrome- left worse than right    Hyperlipidemia with target LDL less than 100    HTN, goal below 140/90    Bronchitis    Osteomyelitis ([address]    Diabetic ulcer of left midfoot ([address]    WD-Diabetic ulcer of left midfoot associated with type 2 diabetes mellitus, with fat layer exposed ([address]    Abscess    Periumbilical hernia    WD-Wound, surgical, infected, initial encounter    Sepsis ([address]    Cellulitis of left foot       Measurements:  Wound 07/18/17 Other (Comment) WOUND #2 LEFT PLANTAR (ONSET 3 MTHS) (Active)   Wound Type Wound 9/21/2018 11:07 AM   Wound Diabetic 9/21/2018 11:07 AM   Dressing Status Clean;Dry; Intact 9/21/2018 11:07 AM   Dressing Changed Changed/New 9/21/2018 11:07 AM Dressing/Treatment Alginate;Silver dressing;Dry dressing 9/21/2018 11:07 AM   Wound Cleansed Rinsed/Irrigated with saline 9/21/2018 11:07 AM   Dressing Change Due 09/17/18 9/19/2018  9:45 AM   Wound Length (cm) 4 cm 9/19/2018  9:15 AM   Wound Width (cm) 1 cm 9/19/2018  9:15 AM   Wound Depth (cm)  0.3 9/19/2018  9:15 AM   Calculated Wound Size (cm^2) (l*w) 4 cm^2 9/19/2018  9:15 AM   Change in Wound Size % (l*w) 83.67 9/19/2018  9:15 AM   Distance Tunneling (cm) 0 cm 9/19/2018  9:15 AM   Tunneling Position ___ O'Clock 0 9/19/2018  9:15 AM   Undermining Starts ___ O'Clock 0 9/19/2018  9:15 AM   Undermining Ends___ O'Clock 0 9/19/2018  9:15 AM   Undermining Maxium Distance (cm) 0 9/19/2018  9:15 AM   Wound Assessment Pink;Yellow 9/21/2018 11:07 AM   Drainage Amount Scant 9/21/2018 11:07 AM   Drainage Description Serosanguinous 9/21/2018 11:07 AM   Odor None 9/21/2018 11:07 AM   Margins Undefined edges 9/21/2018 11:07 AM   Shazia-wound Assessment Maceration 9/21/2018 11:07 AM   [place]%Wound Bed 30 9/21/2018 11:07 AM   Red%Wound Bed 0 9/21/2018 11:07 AM   Yellow%Wound Bed 70 9/21/2018 11:07 AM   Black%Wound Bed 0 9/21/2018 11:07 AM   Purple%Wound Bed 0 9/21/2018 11:07 AM   Other%Wound Bed 0 9/21/2018 11:07 AM   Culture Taken Yes 9/16/2018 11:35 PM   Number of days: 430       Wound 12/03/17 Other (Comment) Foot Left (Active)   Number of days: 291       Wound 12/08/17 #3 abdoment (onset 3 days ago) SURGICAL (Active)   Number of days: 287       Wound 12/08/17 #4 Lt plantar (onset 6 months ago) DIABETIC [place] 1 (Active)   Number of days: 287       Wound 05/18/18 # 5 LEFT PLANTAR (ONSET 1 YEAR AGO) DM WAG 1 (Active)   Number of days: 125       Wound 09/17/18 Left dorsal foot and 4th toe amp site 9/19/18 (Active)   Wound Type Wound 9/21/2018 11:07 AM   Wound Diabetic 9/19/2018  9:15 AM   Dressing Status Dry;Clean; Intact 9/21/2018 11:07 AM   Dressing Changed Changed/New 9/21/2018 11:07 AM   Dressing/Treatment Alginate;Silver

## 2018-09-21 NOTE — PROGRESS NOTES
Hospitalist Progress Note      Name:  Talisha Childers /Age/Sex: 1975  (37 y.o. male)   MRN & CSN:  [id number] & [id number] Admission Date/Time: 9/15/2018  1:06 PM   Location:  [address] [place] [address] PCP: Mandi Mills MD         Hospital Day: 7    Assessment and Plan:   Talisha Childers is a 37 y.o.  male  who presents with <principal problem not specified>    1-Sepsis due to left foot abscess with tachycardia and leukocytosis on presentation which has improved. Blood culture negative so far. 2-Left foot abscess- s/p debridement and amputation of left 4th toe, but MRI also concerns of early osteomyelitis on 3rd toe- consulted ID, input noted and appreciated, wound VAC in place and piccline placed yesterday. Was planning to discharge today, but discharge held as wound reportedly looked bad and podiatrist planning debridement in am.      Other chronic conditions:  · DM2, uncontrolled with A1C 10.3. SSI. · Anxiety, depression, panic attacks - stable  · HLD, stable  · HTN, stable        Diet Dietary Nutrition Supplements: Wound Healing Oral Supplement  DIET CARB CONTROL; Diet NPO, After Midnight   DVT Prophylaxis [x] Lovenox, []  Heparin, [] SCDs, []No VTE prophylaxis, patient ambulating   GI Prophylaxis [x] PPI, [] H2 Blocker, [] No GI prophylaxis, patient is receiving diet/Tube Feeds   Code Status Full Code   Disposition Needs wound debridement as a result of which discharge to home held   MDM [] Low, [x] Moderate,[]  High  Patient's risk as above due to two or more stable chronic illnesses      History of Present Illness:   Continues to feel better, no fever. Discharge held as wound noted to be looking bad as per wound care and podiatrist planning further debridement in the morning. 10-14 point ROS reviewed negative, unless as noted above    Objective:        Intake/Output Summary (Last 24 hours) at 18 1158  Last data filed at 18 0852   Gross per 24 hour   Intake              560 ml   Output throughout the right lower extremity without   significant stenosis or occlusion. 2. Biphasic waveforms are seen throughout the left lower extremity suggestive   for atherosclerotic disease. Vernelle Rile is approximately 50-74% stenosis   involving the proximal left peroneal artery. Vernelle Rile is no evidence of   occlusion.          Electronically signed by Tabitha Major MD on 9/21/2018 at 11:58 AM

## 2018-09-22 ENCOUNTER — ANESTHESIA (OUTPATIENT)
Dept: OPERATING ROOM | Age: 43
DRG: 710 | End: 2018-09-22
Payer: COMMERCIAL

## 2018-09-22 ENCOUNTER — APPOINTMENT (OUTPATIENT)
Dept: CT IMAGING | Age: 43
DRG: 710 | End: 2018-09-22
Payer: COMMERCIAL

## 2018-09-22 ENCOUNTER — ANESTHESIA EVENT (OUTPATIENT)
Dept: OPERATING ROOM | Age: 43
DRG: 710 | End: 2018-09-22
Payer: COMMERCIAL

## 2018-09-22 VITALS — SYSTOLIC BLOOD PRESSURE: 109 MMHG | DIASTOLIC BLOOD PRESSURE: 75 MMHG | OXYGEN SATURATION: 100 %

## 2018-09-22 LAB
ALBUMIN SERPL-MCNC: 2.9 GM/DL (ref 3.4–5)
ANION GAP SERPL CALCULATED.3IONS-SCNC: 10 MMOL/L (ref 4–16)
BUN BLDV-MCNC: 14 MG/DL (ref 6–23)
CALCIUM SERPL-MCNC: 8.4 MG/DL (ref 8.3–10.6)
CHLORIDE BLD-SCNC: 104 MMOL/L (ref 99–110)
CO2: 23 MMOL/L (ref 21–32)
CREAT SERPL-MCNC: 0.9 MG/DL (ref 0.9–1.3)
GFR AFRICAN AMERICAN: >60 ML/MIN/1.73M2
GFR NON-AFRICAN AMERICAN: >60 ML/MIN/1.73M2
GLUCOSE BLD-MCNC: 116 MG/DL (ref 70–99)
GLUCOSE BLD-MCNC: 125 MG/DL (ref 70–99)
GLUCOSE BLD-MCNC: 139 MG/DL (ref 70–99)
GLUCOSE BLD-MCNC: 163 MG/DL (ref 70–99)
GLUCOSE BLD-MCNC: 167 MG/DL (ref 70–99)
PHOSPHORUS: 3.8 MG/DL (ref 2.5–4.9)
POTASSIUM SERPL-SCNC: 4.1 MMOL/L (ref 3.5–5.1)
SODIUM BLD-SCNC: 137 MMOL/L (ref 135–145)

## 2018-09-22 PROCEDURE — 3600000012 HC SURGERY LEVEL 2 ADDTL 15MIN: Performed by: PODIATRIST

## 2018-09-22 PROCEDURE — 2500000003 HC RX 250 WO HCPCS: Performed by: NURSE ANESTHETIST, CERTIFIED REGISTERED

## 2018-09-22 PROCEDURE — 82962 GLUCOSE BLOOD TEST: CPT

## 2018-09-22 PROCEDURE — 2580000003 HC RX 258: Performed by: INTERNAL MEDICINE

## 2018-09-22 PROCEDURE — 74176 CT ABD & PELVIS W/O CONTRAST: CPT

## 2018-09-22 PROCEDURE — 87071 CULTURE AEROBIC QUANT OTHER: CPT

## 2018-09-22 PROCEDURE — 6360000002 HC RX W HCPCS: Performed by: NURSE ANESTHETIST, CERTIFIED REGISTERED

## 2018-09-22 PROCEDURE — 1200000000 HC SEMI PRIVATE

## 2018-09-22 PROCEDURE — 6360000002 HC RX W HCPCS: Performed by: INTERNAL MEDICINE

## 2018-09-22 PROCEDURE — 3600000002 HC SURGERY LEVEL 2 BASE: Performed by: PODIATRIST

## 2018-09-22 PROCEDURE — 87073 CULTURE BACTERIA ANAEROBIC: CPT

## 2018-09-22 PROCEDURE — 6360000002 HC RX W HCPCS: Performed by: PODIATRIST

## 2018-09-22 PROCEDURE — 87186 SC STD MICRODIL/AGAR DIL: CPT

## 2018-09-22 PROCEDURE — 2709999900 HC NON-CHARGEABLE SUPPLY: Performed by: PODIATRIST

## 2018-09-22 PROCEDURE — 2580000003 HC RX 258: Performed by: HOSPITALIST

## 2018-09-22 PROCEDURE — 2500000003 HC RX 250 WO HCPCS: Performed by: INTERNAL MEDICINE

## 2018-09-22 PROCEDURE — 6360000002 HC RX W HCPCS: Performed by: STUDENT IN AN ORGANIZED HEALTH CARE EDUCATION/TRAINING PROGRAM

## 2018-09-22 PROCEDURE — 9990 CHARGE CONVERSION

## 2018-09-22 PROCEDURE — 3700000000 HC ANESTHESIA ATTENDED CARE: Performed by: PODIATRIST

## 2018-09-22 PROCEDURE — 6360000002 HC RX W HCPCS: Performed by: HOSPITALIST

## 2018-09-22 PROCEDURE — 3700000001 HC ADD 15 MINUTES (ANESTHESIA): Performed by: PODIATRIST

## 2018-09-22 PROCEDURE — 87205 SMEAR GRAM STAIN: CPT

## 2018-09-22 PROCEDURE — 2500000003 HC RX 250 WO HCPCS: Performed by: PODIATRIST

## 2018-09-22 PROCEDURE — 80069 RENAL FUNCTION PANEL: CPT

## 2018-09-22 PROCEDURE — 2580000003 HC RX 258: Performed by: STUDENT IN AN ORGANIZED HEALTH CARE EDUCATION/TRAINING PROGRAM

## 2018-09-22 PROCEDURE — 2580000003 HC RX 258: Performed by: PODIATRIST

## 2018-09-22 PROCEDURE — 0JBR0ZZ EXCISION OF LEFT FOOT SUBCUTANEOUS TISSUE AND FASCIA, OPEN APPROACH: ICD-10-PCS | Performed by: PODIATRIST

## 2018-09-22 PROCEDURE — 6370000000 HC RX 637 (ALT 250 FOR IP): Performed by: INTERNAL MEDICINE

## 2018-09-22 RX ORDER — MIDAZOLAM HYDROCHLORIDE 1 MG/ML
INJECTION INTRAMUSCULAR; INTRAVENOUS PRN
Status: DISCONTINUED | OUTPATIENT
Start: 2018-09-22 | End: 2018-09-22 | Stop reason: SDUPTHER

## 2018-09-22 RX ORDER — BUPIVACAINE HYDROCHLORIDE 2.5 MG/ML
INJECTION, SOLUTION EPIDURAL; INFILTRATION; INTRACAUDAL
Status: COMPLETED | OUTPATIENT
Start: 2018-09-22 | End: 2018-09-22

## 2018-09-22 RX ORDER — LIDOCAINE HYDROCHLORIDE 10 MG/ML
INJECTION, SOLUTION INFILTRATION; PERINEURAL
Status: COMPLETED | OUTPATIENT
Start: 2018-09-22 | End: 2018-09-22

## 2018-09-22 RX ORDER — LORAZEPAM 2 MG/ML
0.5 INJECTION INTRAMUSCULAR ONCE
Status: COMPLETED | OUTPATIENT
Start: 2018-09-22 | End: 2018-09-22

## 2018-09-22 RX ORDER — LIDOCAINE HYDROCHLORIDE 20 MG/ML
INJECTION, SOLUTION INFILTRATION; PERINEURAL PRN
Status: DISCONTINUED | OUTPATIENT
Start: 2018-09-22 | End: 2018-09-22 | Stop reason: SDUPTHER

## 2018-09-22 RX ORDER — PROPOFOL 10 MG/ML
INJECTION, EMULSION INTRAVENOUS CONTINUOUS PRN
Status: DISCONTINUED | OUTPATIENT
Start: 2018-09-22 | End: 2018-09-22 | Stop reason: SDUPTHER

## 2018-09-22 RX ADMIN — SODIUM CHLORIDE, PRESERVATIVE FREE 10 ML: 5 INJECTION INTRAVENOUS at 22:04

## 2018-09-22 RX ADMIN — SODIUM CHLORIDE, PRESERVATIVE FREE 10 ML: 5 INJECTION INTRAVENOUS at 14:07

## 2018-09-22 RX ADMIN — LIDOCAINE HYDROCHLORIDE 100 MG: 20 INJECTION, SOLUTION INFILTRATION; PERINEURAL at 12:30

## 2018-09-22 RX ADMIN — LORAZEPAM 0.5 MG: 2 INJECTION INTRAMUSCULAR; INTRAVENOUS at 22:57

## 2018-09-22 RX ADMIN — INSULIN GLARGINE 70 UNITS: 100 INJECTION, SOLUTION SUBCUTANEOUS at 22:11

## 2018-09-22 RX ADMIN — PROPOFOL 75 MCG/KG/MIN: 10 INJECTION, EMULSION INTRAVENOUS at 12:30

## 2018-09-22 RX ADMIN — MORPHINE SULFATE 4 MG: 4 INJECTION INTRAVENOUS at 08:20

## 2018-09-22 RX ADMIN — SODIUM CHLORIDE, PRESERVATIVE FREE 10 ML: 5 INJECTION INTRAVENOUS at 08:32

## 2018-09-22 RX ADMIN — INSULIN LISPRO 4 UNITS: 100 INJECTION, SOLUTION INTRAVENOUS; SUBCUTANEOUS at 08:52

## 2018-09-22 RX ADMIN — MORPHINE SULFATE 4 MG: 4 INJECTION INTRAVENOUS at 14:06

## 2018-09-22 RX ADMIN — METFORMIN HYDROCHLORIDE 850 MG: 850 TABLET ORAL at 19:17

## 2018-09-22 RX ADMIN — PROCHLORPERAZINE EDISYLATE 10 MG: 5 INJECTION INTRAMUSCULAR; INTRAVENOUS at 22:01

## 2018-09-22 RX ADMIN — MORPHINE SULFATE 4 MG: 4 INJECTION INTRAVENOUS at 22:02

## 2018-09-22 RX ADMIN — SODIUM CHLORIDE, PRESERVATIVE FREE 10 ML: 5 INJECTION INTRAVENOUS at 15:42

## 2018-09-22 RX ADMIN — INSULIN LISPRO 4 UNITS: 100 INJECTION, SOLUTION INTRAVENOUS; SUBCUTANEOUS at 22:13

## 2018-09-22 RX ADMIN — Medication 4 MG: at 15:42

## 2018-09-22 RX ADMIN — HYDRALAZINE HYDROCHLORIDE 10 MG: 20 INJECTION INTRAMUSCULAR; INTRAVENOUS at 19:17

## 2018-09-22 RX ADMIN — METRONIDAZOLE 500 MG: 500 INJECTION, SOLUTION INTRAVENOUS at 19:17

## 2018-09-22 RX ADMIN — Medication 4 MG: at 08:21

## 2018-09-22 RX ADMIN — MIDAZOLAM HYDROCHLORIDE 2 MG: 1 INJECTION, SOLUTION INTRAMUSCULAR; INTRAVENOUS at 12:25

## 2018-09-22 RX ADMIN — Medication 4 MG: at 00:30

## 2018-09-22 RX ADMIN — CEFTRIAXONE SODIUM 2 G: 2 INJECTION, POWDER, FOR SOLUTION INTRAMUSCULAR; INTRAVENOUS at 22:04

## 2018-09-22 RX ADMIN — Medication 4 MG: at 18:54

## 2018-09-22 RX ADMIN — METRONIDAZOLE 500 MG: 500 INJECTION, SOLUTION INTRAVENOUS at 12:40

## 2018-09-22 RX ADMIN — METRONIDAZOLE 500 MG: 500 INJECTION, SOLUTION INTRAVENOUS at 01:50

## 2018-09-22 ASSESSMENT — PULMONARY FUNCTION TESTS
PIF_VALUE: 0

## 2018-09-22 ASSESSMENT — PAIN SCALES - GENERAL
PAINLEVEL_OUTOF10: 8
PAINLEVEL_OUTOF10: 8
PAINLEVEL_OUTOF10: 7
PAINLEVEL_OUTOF10: 7
PAINLEVEL_OUTOF10: 8
PAINLEVEL_OUTOF10: 7

## 2018-09-22 NOTE — PROGRESS NOTES
Hospitalist Progress Note      Name:  Chayito Ruffin /Age/Sex: 1975  (37 y.o. male)   MRN & CSN:  [id number] & [id number] Admission Date/Time: 9/15/2018  1:06 PM   Location:  [address] [place] [address] PCP: Navarro Goetz MD         Hospital Day: 8    Assessment and Plan:   Chayito Ruffin is a 37 y.o.  male  who presents with <principal problem not specified>    1-Sepsis due to left foot abscess with tachycardia and leukocytosis on presentation which has improved. Blood culture negative so far. 2-Left foot abscess- s/p debridement and amputation of left 4th toe, but MRI also concerns of early osteomyelitis on 3rd toe- consulted ID, input noted and appreciated, wound VAC in place and piccline placed. Wound looked bad and as per podiatrist had another debridement today. Will discharge home with IV abx as per ID recommendation. 3-Abdominal pain with nausea/vomiting since last night- check c diff and CT abdomen/pelvis, and therefore, holding discharge      Other chronic conditions:  · DM2, uncontrolled with A1C 10.3. SSI. · Anxiety, depression, panic attacks - stable  · HLD, stable  · HTN, stable        Diet Dietary Nutrition Supplements: Wound Healing Oral Supplement  DIET CARB CONTROL;   DVT Prophylaxis [x] Lovenox, []  Heparin, [] SCDs, []No VTE prophylaxis, patient ambulating   GI Prophylaxis [x] PPI, [] H2 Blocker, [] No GI prophylaxis, patient is receiving diet/Tube Feeds   Code Status Full Code   Disposition    MDM [] Low, [x] Moderate,[]  High  Patient's risk as above due to two or more stable chronic illnesses      History of Present Illness:   Complains of nausea/vomiting/abdominal pain, and diarrhea since last night. 10-14 point ROS reviewed negative, unless as noted above    Objective:        Intake/Output Summary (Last 24 hours) at 18 1551  Last data filed at 18 1548   Gross per 24 hour   Intake           1252.5 ml   Output              760 ml   Net            492.5 ml      Vitals:

## 2018-09-22 NOTE — PROGRESS NOTES
bowel sounds normal; no masses,  no organomegaly  Musculoskeletal: Normal  Extremities: extremities normal excepyt left foot ulceration , , has  Edema left 4th toe amputated Added on vacuum  Neurologic:  Awake, alert, oriented to name, place and time. Cranial nerves II-XII are grossly intact. Motor is  intact. Sensory ineuropathy ,  and gait is normal.    Assessment:     Patient Active Problem List:     Hiatal hernia     Diabetes mellitus type 2, improved controlled     Diabetic neuropathy (HCC)     Panic attack     Anxiety associated with depression     Neck pain     DANIELA (obstructive sleep apnea)     Urinary frequency     Bilateral carpal tunnel syndrome- left worse than right     Hyperlipidemia with target LDL less than 100     HTN, goal below 140/90     Bronchitis     Osteomyelitis (HCC)     WD-Diabetic ulcer of left midfoot associated with type 2 diabetes mellitus, with fat layer exposed ([address]     Abscess     Periumbilical hernia     WD-Wound, surgical, infected, initial encounter     Sepsis ([address]    Left 4 th digit was amputated       Plan:     1. Reviewed POC blood glucose . Labs and X ray results   2. Reviewed Current Medicines   3. On meal/ Correction bolus Humalog/ Basal Lantus Insulin regime   4. Monitor Blood glucose frequently   5. Modified  the dose of Insulin/ other medicines as needed   6. Going for wound debridement today possibly    7. Now is  on wound vacuum  8. Will follow     .      Mark Rebollar MD

## 2018-09-22 NOTE — ANESTHESIA PRE PROCEDURE
Historical Provider, MD   glucose monitoring kit (FREESTYLE) monitoring kit 1 each by Does not apply route once for 1 dose.  6/20/13 6/20/13  Apryl Carroll MD       Current medications:    Current Facility-Administered Medications   Medication Dose Route Frequency Provider Last Rate Last Dose    metFORMIN (GLUCOPHAGE) tablet 850 mg  850 mg Oral BID  Jluis Pineda MD   850 mg at 09/21/18 1659    0.9 % sodium chloride infusion   Intravenous Continuous Rodrigue Gonzales MD 75 mL/hr at 09/21/18 2235      polyethylene glycol (GLYCOLAX) packet 17 g  17 g Oral Daily Esther Linares MD   17 g at 09/21/18 0848    sodium chloride flush 0.9 % injection 10 mL  10 mL Intravenous 2 times per day Otto Cain MD   10 mL at 09/22/18 7757    sodium chloride flush 0.9 % injection 10 mL  10 mL Intravenous PRN Otto Cain MD        hydrALAZINE (APRESOLINE) 10 mg in sodium chloride 0.9 % 50 mL ivpb  10 mg Intravenous Q4H PRN Angela Lowe MD   Stopped at 09/21/18 1730    metronidazole (FLAGYL) 500 mg in NaCl 100 mL IVPB premix  500 mg Intravenous Q8H Moira Burch MD   Stopped at 09/22/18 0250    cefTRIAXone (ROCEPHIN) 2 g in dextrose 5 % 50 mL IVPB  2 g Intravenous Q24H Moira Burch MD   Stopped at 09/21/18 2305    insulin lispro (HUMALOG) injection vial 20 Units  20 Units Subcutaneous TID  Jluis Pineda MD   20 Units at 09/21/18 1919    insulin lispro (HUMALOG) injection vial 0-25 Units  0-25 Units Subcutaneous TID  Jluis Pineda MD   4 Units at 09/22/18 1084    insulin lispro (HUMALOG) injection vial 0-25 Units  0-25 Units Subcutaneous 2 times per day Jluis Pineda MD   8 Units at 09/21/18 2242    aspirin chewable tablet 81 mg  81 mg Oral Daily Donald Almanza PA-C   81 mg at 09/21/18 0848    insulin glargine (LANTUS) injection vial 70 Units  70 Units Subcutaneous Nightly Jluis Pineda MD   70 Units at 09/21/18 2242    linagliptin (TRADJENTA) tablet 5 mg  5 mg Oral Daily M UPPER GASTROINTESTINAL ENDOSCOPY  06/2/2011    Mild gastritis with moderatley severe antritis, small hiatal hernia, Dr. Maria E Jackson       Social History:    Social History   Substance Use Topics    Smoking status: Former Smoker     Years: 20.00     Quit date: 11/18/2017    Smokeless tobacco: Never Used    Alcohol use No      Comment: 4 cans soda daily, 2 cups tea daily/alchohol- average one drink per month                                Counseling given: Not Answered      Vital Signs (Current):   Vitals:    09/21/18 1656 09/21/18 2237 09/22/18 0701 09/22/18 1203   BP: (!) 171/90 (!) 140/80 (!) 136/91 (!) 148/91   Pulse: 74 80 95 73   Resp: 16 17 17 18   Temp: 97.7 °F (36.5 °C) 97.4 °F (36.3 °C) 98 °F (36.7 °C) 98.1 °F (36.7 °C)   TempSrc: Oral Oral Oral Tympanic   SpO2: 99% 98% 100% 97%   Weight:       Height:                                                  BP Readings from Last 3 Encounters:   09/22/18 (!) 148/91   07/09/18 (!) 150/91   05/27/18 122/88       NPO Status: Time of last liquid consumption: 1030                        Time of last solid consumption: 1130                        Date of last liquid consumption: 09/21/18                        Date of last solid food consumption: 09/21/18    BMI:   Wt Readings from Last 3 Encounters:   09/21/18 253 lb 8 oz (115 kg)   07/09/18 280 lb (127 kg)   05/27/18 270 lb (122.5 kg)     Body mass index is 36.37 kg/m².     CBC:   Lab Results   Component Value Date    WBC 8.0 09/21/2018    RBC 3.74 09/21/2018    HGB 11.1 09/21/2018    HCT 33.5 09/21/2018    MCV 89.6 09/21/2018    RDW 12.0 09/21/2018     09/21/2018       CMP:   Lab Results   Component Value Date     09/17/2018    K 3.9 09/17/2018    CL 98 09/17/2018    CO2 24 09/17/2018    BUN 12 09/20/2018    CREATININE 1.1 09/20/2018    GFRAA >60 09/20/2018    LABGLOM >60 09/20/2018    GLUCOSE 204 09/17/2018    PROT 6.7 09/15/2018    PROT 7.1 04/11/2012    CALCIUM 8.3 09/17/2018    BILITOT 0.8 09/15/2018

## 2018-09-23 VITALS
HEART RATE: 83 BPM | RESPIRATION RATE: 16 BRPM | HEIGHT: 70 IN | SYSTOLIC BLOOD PRESSURE: 147 MMHG | OXYGEN SATURATION: 99 % | DIASTOLIC BLOOD PRESSURE: 76 MMHG | TEMPERATURE: 97.7 F | WEIGHT: 253.5 LBS | BODY MASS INDEX: 36.29 KG/M2

## 2018-09-23 LAB
GLUCOSE BLD-MCNC: 157 MG/DL (ref 70–99)
GLUCOSE BLD-MCNC: 193 MG/DL (ref 70–99)
GLUCOSE BLD-MCNC: 274 MG/DL (ref 70–99)

## 2018-09-23 PROCEDURE — 6360000002 HC RX W HCPCS: Performed by: STUDENT IN AN ORGANIZED HEALTH CARE EDUCATION/TRAINING PROGRAM

## 2018-09-23 PROCEDURE — 6370000000 HC RX 637 (ALT 250 FOR IP): Performed by: STUDENT IN AN ORGANIZED HEALTH CARE EDUCATION/TRAINING PROGRAM

## 2018-09-23 PROCEDURE — 6360000002 HC RX W HCPCS: Performed by: HOSPITALIST

## 2018-09-23 PROCEDURE — 94761 N-INVAS EAR/PLS OXIMETRY MLT: CPT

## 2018-09-23 PROCEDURE — 6370000000 HC RX 637 (ALT 250 FOR IP): Performed by: INTERNAL MEDICINE

## 2018-09-23 PROCEDURE — 6370000000 HC RX 637 (ALT 250 FOR IP): Performed by: PHYSICIAN ASSISTANT

## 2018-09-23 PROCEDURE — 2580000003 HC RX 258: Performed by: HOSPITALIST

## 2018-09-23 PROCEDURE — 2500000003 HC RX 250 WO HCPCS: Performed by: INTERNAL MEDICINE

## 2018-09-23 RX ORDER — ONDANSETRON 4 MG/1
4 TABLET, FILM COATED ORAL EVERY 8 HOURS PRN
Qty: 20 TABLET | Refills: 0 | Status: SHIPPED | OUTPATIENT
Start: 2018-09-23

## 2018-09-23 RX ADMIN — METRONIDAZOLE 500 MG: 500 INJECTION, SOLUTION INTRAVENOUS at 02:04

## 2018-09-23 RX ADMIN — INSULIN LISPRO 12 UNITS: 100 INJECTION, SOLUTION INTRAVENOUS; SUBCUTANEOUS at 14:57

## 2018-09-23 RX ADMIN — ASPIRIN 81 MG 81 MG: 81 TABLET ORAL at 10:24

## 2018-09-23 RX ADMIN — OXYCODONE HYDROCHLORIDE AND ACETAMINOPHEN 1 TABLET: 7.5; 325 TABLET ORAL at 16:33

## 2018-09-23 RX ADMIN — INSULIN LISPRO 4 UNITS: 100 INJECTION, SOLUTION INTRAVENOUS; SUBCUTANEOUS at 02:07

## 2018-09-23 RX ADMIN — INSULIN LISPRO 20 UNITS: 100 INJECTION, SOLUTION INTRAVENOUS; SUBCUTANEOUS at 14:58

## 2018-09-23 RX ADMIN — MORPHINE SULFATE 4 MG: 4 INJECTION INTRAVENOUS at 06:15

## 2018-09-23 RX ADMIN — HYDRALAZINE HYDROCHLORIDE 10 MG: 20 INJECTION INTRAMUSCULAR; INTRAVENOUS at 00:33

## 2018-09-23 RX ADMIN — LINAGLIPTIN 5 MG: 5 TABLET, FILM COATED ORAL at 10:23

## 2018-09-23 RX ADMIN — METRONIDAZOLE 500 MG: 500 INJECTION, SOLUTION INTRAVENOUS at 10:22

## 2018-09-23 ASSESSMENT — PAIN SCALES - GENERAL
PAINLEVEL_OUTOF10: 7
PAINLEVEL_OUTOF10: 7
PAINLEVEL_OUTOF10: 8

## 2018-09-23 NOTE — PROGRESS NOTES
Verified with [facility] they will be able to visit patient today to apply wound vac and infuse IV antibiotic. Waiting to hear back from [facility] regarding ABX delivery. Return call from Maria Ines Lord will be delivered tonight to patient home.

## 2018-09-23 NOTE — DISCHARGE SUMMARY
Discharge Summary    Name:  Judd Lopez /Age/Sex: 1975  (37 y.o. male)   MRN & CSN:  [id number] & [id number] Admission Date/Time: 9/15/2018  1:06 PM   Attending:  Maribel Koch DO Discharging Physician: Yoselin Green MD     HPI and Hospital Course:   HPI: As per H and P: Judd Lopez is a 37 y.o.  male  who presents with left foot pain with concern for infection of left foot pressure ulcer. Patient and examined in the ED. He states he has been working with podiatry for several months. Presents today for worsening pain and odor of his left foot. No alleviating factors. Pain is w/w standing. Takes percocets 7.5 for pain per Dr Mary Gilmore. Pain radiates up to his left thigh which was the final reason he presented. Denies fevers or chills. HOSPITAL COURSE:  1-Sepsis due to left foot abscess with tachycardia and leukocytosis on presentation which has improved. Blood culture negative. 2-Left foot abscess- s/p debridement and amputation of left 4th toe, but MRI also concerns of early osteomyelitis on 3rd toe- consulted ID, input noted and appreciated, surgical culture growing strep- as per ID will need 6 weeks of IV abx and then another 6 weeks of oral. Piccline placed, also wound VAC in place- ordered [facility] to do IV abx- weekly labs including CBC, BMP, hepatic panel and ESR/CRP- to be notified to ID. Had debridement again yesterday by podiatrist.  3-Hyperglycemia with underlying uncontrolled DM- medications adjusted as per endo, last hemoglobin A1C is 10.3, admitted to non-compliance which he has been counseled. To follow up with . 4-Chronic pain syndrome- no changes in narcotics made, to follow up with  as previously planned. 5-Nausea/vomiting- since last two days. Has much improved, tolerating oral intake, CT negative. Blood work okay.     Other chronic conditions:  · DM2, uncontrolled with A1C 10.3. SSI.   · Anxiety, depression, panic attacks - stable  · HLD, stable  · HTN, stable        The patient expressed appropriate understanding of and agreement with the discharge recommendations, medications, and plan. Consults this admission:  PHARMACY TO DOSE VANCOMYCIN  IP CONSULT TO HOSPITALIST  IP CONSULT TO SPIRITUAL SERVICES  IP CONSULT TO ENDOCRINOLOGY  IP CONSULT TO PODIATRY  IP CONSULT TO VASCULAR SURGERY  IP CONSULT TO DIABETES EDUCATOR  IP CONSULT TO INFECTIOUS DISEASES  IP CONSULT TO [address] [place] [address] NEEDS    Discharge Instruction:   Follow up appointments:   Primary care physician:  within 2 weeks    Diet:  General/cardiac/ADA/as tolerated  Activity: {discharge activity: as tolerated  Disposition: Discharged to:   [x]Home, [][facility], []SNF, []Acute Rehab, []Hospice   Condition on discharge: Stable    Discharge Medications:      Criss Brorashad   Home Medication Instructions [id number]    Printed on:09/23/18 1200   Medication Information                      aspirin 81 MG chewable tablet  Take 1 tablet by mouth daily             cefTRIAXone (ROCEPHIN) infusion  Infuse 2,000 mg intravenously every 24 hours Compound per protocol             glucose monitoring kit (FREESTYLE) monitoring kit  1 each by Does not apply route once for 1 dose.              glucose monitoring kit (FREESTYLE) monitoring kit  Use as recommended             insulin glargine (BASAGLAR KWIKPEN) 100 UNIT/ML injection pen  Inject 70 Units into the skin nightly             insulin lispro (HUMALOG KWIKPEN) 100 UNIT/ML pen  Inject 20 Units into the skin 3 times daily (before meals)             Insulin Pen Needle 32G X 5 MM MISC  1 each by Does not apply route daily             Lancets MISC  1 each by Does not apply route daily             linagliptin (TRADJENTA) 5 MG tablet  Take 1 tablet by mouth daily             metFORMIN (GLUCOPHAGE) 850 MG tablet  Take 1 tablet by mouth 2 times daily (with meals)             metroNIDAZOLE (FLAGYL) 500 MG tablet  Take 1 tablet by mouth 3 times daily

## 2018-09-23 NOTE — OP NOTE
[address] [place] [address], [address] [place] [address]                                 OPERATIVE REPORT    PATIENT NAME: Noe Bal                     :        1975  MED REC NO:   [id number]                          ROOM:       [id number]  ACCOUNT NO:   [de-identified]                          ADMIT DATE: 09/15/2018  PROVIDER:     Ranulfo Ochoa DPM    DATE OF PROCEDURE:  2018    SURGEON ON CASE:  Ranulfo Ochoa DPM    PREOPERATIVE DIAGNOSES:  1. Necrotic surgical wound, dorsal aspect of the left foot. 2.  Nonhealing plantar ulceration of the left foot at the second  metatarsal/third metatarsophalangeal joint. POSTOPERATIVE DIAGNOSES:  1. Necrotic surgical wound, dorsal aspect of the left foot. 2.  Nonhealing plantar ulceration of the left foot at the second  metatarsal/third metatarsophalangeal joint. PROCEDURES PERFORMED:  1. Excisional debridement of the left foot dorsal wound. 2.  Excisional debridement of plantar ulceration of the left foot. DESCRIPTION OF PROCEDURE:  The patient was brought to the operating room  and placed supine on the operating room table where IV sedation was given. He was then anesthetized utilizing 20 mL of 1% lidocaine plain in the block  of the left ankle, left dorsal wound, and left plantar ulceration. A  tourniquet was then placed about the left ankle, but was never inflated  during the surgical procedure. The foot was then prepped and draped in the  usual sterile manner. A 15-blade was utilized to probe the dorsal wound on this dorsal foot. The  patient had previously undergone a left fourth toe amputation at the  metatarsophalangeal joint and debridement of the necrotic abscess on the  dorsal aspect of the foot over the third, fourth, and fifth metatarsals. He had a big open wound with loss of skin and soft tissues over this area.    There was still subcutaneous tissue exposed in this
0.25% Marcaine utilizing 10 mL. The plantar ulceration was then addressed. A 15 blade was utilized to do  an excisional debridement of this plantar ulceration. The skin surrounding  the ulceration was hyperkeratotic and this was debrided sharply with a 15  blade. After removal of all the hyperkeratotic callus tissue around the  periphery, the ulceration itself was addressed and found to have no  perforating sinus tract. The subcutaneous tissue and the base was debrided  sharply with a 15 blade to bleeding tissue. There was no pus found in this  area. At this point, this wound was dressed with Adaptic. The dorsal  wound was packed with iodoform, dressed with Adaptic fluff and both wounds  were covered with fluff and ABDs, Kerlix and then Coban.   The patient  tolerated the procedure well and left the OR with vital signs stable as  well and will be seen here at the hospital.        [facility] BENNY JACKSON    D: 09/18/2018 9:08:42       T: 09/18/2018 11:50:46     [email or web address]  Job#: [id number]     Doc#: [id number]    CC:

## 2018-09-26 LAB
CULTURE: NORMAL
GRAM SMEAR: NORMAL
Lab: NORMAL
ORGANISM: NORMAL
REPORT STATUS: NORMAL
SPECIMEN: NORMAL

## 2018-10-01 ENCOUNTER — HOSPITAL ENCOUNTER (OUTPATIENT)
Age: 43
Setting detail: SPECIMEN
Discharge: HOME | End: 2018-10-01
Payer: COMMERCIAL

## 2018-10-01 LAB
ANION GAP SERPL CALCULATED.3IONS-SCNC: 14 MMOL/L (ref 4–16)
BASOPHILS ABSOLUTE: 0.1 K/CU MM
BASOPHILS RELATIVE PERCENT: 1 % (ref 0–1)
BUN BLDV-MCNC: 19 MG/DL (ref 6–23)
CALCIUM SERPL-MCNC: 8.6 MG/DL (ref 8.3–10.6)
CHLORIDE BLD-SCNC: 98 MMOL/L (ref 99–110)
CO2: 25 MMOL/L (ref 21–32)
CREAT SERPL-MCNC: 0.8 MG/DL (ref 0.9–1.3)
DIFFERENTIAL TYPE: ABNORMAL
EOSINOPHILS ABSOLUTE: 0.2 K/CU MM
EOSINOPHILS RELATIVE PERCENT: 2.4 % (ref 0–3)
GFR AFRICAN AMERICAN: >60 ML/MIN/1.73M2
GFR NON-AFRICAN AMERICAN: >60 ML/MIN/1.73M2
GLUCOSE BLD-MCNC: 104 MG/DL (ref 70–99)
HCT VFR BLD CALC: 39.6 % (ref 42–52)
HEMOGLOBIN: 12.6 GM/DL (ref 13.5–18)
IMMATURE NEUTROPHIL %: 0.7 % (ref 0–0.43)
LYMPHOCYTES ABSOLUTE: 1.2 K/CU MM
LYMPHOCYTES RELATIVE PERCENT: 14.7 % (ref 24–44)
MCH RBC QN AUTO: 29.4 PG (ref 27–31)
MCHC RBC AUTO-ENTMCNC: 31.8 % (ref 32–36)
MCV RBC AUTO: 92.5 FL (ref 78–100)
MONOCYTES ABSOLUTE: 0.7 K/CU MM
MONOCYTES RELATIVE PERCENT: 8.3 % (ref 0–4)
NUCLEATED RBC %: 0 %
PDW BLD-RTO: 13.5 % (ref 11.7–14.9)
PLATELET # BLD: 287 K/CU MM (ref 140–440)
PMV BLD AUTO: 10.7 FL (ref 7.5–11.1)
POTASSIUM SERPL-SCNC: 4.1 MMOL/L (ref 3.5–5.1)
RBC # BLD: 4.28 M/CU MM (ref 4.6–6.2)
SEGMENTED NEUTROPHILS ABSOLUTE COUNT: 6 K/CU MM
SEGMENTED NEUTROPHILS RELATIVE PERCENT: 72.9 % (ref 36–66)
SODIUM BLD-SCNC: 137 MMOL/L (ref 135–145)
TOTAL IMMATURE NEUTOROPHIL: 0.06 K/CU MM
TOTAL NUCLEATED RBC: 0 K/CU MM
WBC # BLD: 8.2 K/CU MM (ref 4–10.5)

## 2018-10-01 PROCEDURE — 80048 BASIC METABOLIC PNL TOTAL CA: CPT

## 2018-10-01 PROCEDURE — 85025 COMPLETE CBC W/AUTO DIFF WBC: CPT

## 2018-10-06 ENCOUNTER — APPOINTMENT (OUTPATIENT)
Dept: CT IMAGING | Age: 43
End: 2018-10-06
Payer: COMMERCIAL

## 2018-10-06 ENCOUNTER — APPOINTMENT (OUTPATIENT)
Dept: GENERAL RADIOLOGY | Age: 43
End: 2018-10-06
Payer: COMMERCIAL

## 2018-10-06 ENCOUNTER — HOSPITAL ENCOUNTER (OUTPATIENT)
Age: 43
Setting detail: OBSERVATION
Discharge: HOME | End: 2018-10-08
Attending: EMERGENCY MEDICINE | Admitting: EMERGENCY MEDICINE
Payer: COMMERCIAL

## 2018-10-06 DIAGNOSIS — K62.89 PROCTITIS: ICD-10-CM

## 2018-10-06 DIAGNOSIS — K56.41 FECAL IMPACTION (HCC): Primary | ICD-10-CM

## 2018-10-06 LAB
ALBUMIN SERPL-MCNC: 3.3 GM/DL (ref 3.4–5)
ALP BLD-CCNC: 54 IU/L (ref 40–129)
ALT SERPL-CCNC: 16 U/L (ref 10–40)
ANION GAP SERPL CALCULATED.3IONS-SCNC: 18 MMOL/L (ref 4–16)
AST SERPL-CCNC: 16 IU/L (ref 15–37)
BASOPHILS ABSOLUTE: 0.1 K/CU MM
BASOPHILS RELATIVE PERCENT: 0.5 % (ref 0–1)
BILIRUB SERPL-MCNC: 0.3 MG/DL (ref 0–1)
BUN BLDV-MCNC: 20 MG/DL (ref 6–23)
CALCIUM SERPL-MCNC: 9 MG/DL (ref 8.3–10.6)
CHLORIDE BLD-SCNC: 100 MMOL/L (ref 99–110)
CO2: 17 MMOL/L (ref 21–32)
CREAT SERPL-MCNC: 0.9 MG/DL (ref 0.9–1.3)
DIFFERENTIAL TYPE: ABNORMAL
EOSINOPHILS ABSOLUTE: 0.2 K/CU MM
EOSINOPHILS RELATIVE PERCENT: 1.5 % (ref 0–3)
GFR AFRICAN AMERICAN: >60 ML/MIN/1.73M2
GFR NON-AFRICAN AMERICAN: >60 ML/MIN/1.73M2
GLUCOSE BLD-MCNC: 94 MG/DL (ref 70–99)
HCT VFR BLD CALC: 37.5 % (ref 42–52)
HEMOGLOBIN: 12.6 GM/DL (ref 13.5–18)
IMMATURE NEUTROPHIL %: 0.6 % (ref 0–0.43)
LYMPHOCYTES ABSOLUTE: 1.6 K/CU MM
LYMPHOCYTES RELATIVE PERCENT: 13.9 % (ref 24–44)
MCH RBC QN AUTO: 29.4 PG (ref 27–31)
MCHC RBC AUTO-ENTMCNC: 33.6 % (ref 32–36)
MCV RBC AUTO: 87.4 FL (ref 78–100)
MONOCYTES ABSOLUTE: 1 K/CU MM
MONOCYTES RELATIVE PERCENT: 8.8 % (ref 0–4)
NUCLEATED RBC %: 0 %
PDW BLD-RTO: 13.4 % (ref 11.7–14.9)
PLATELET # BLD: 309 K/CU MM (ref 140–440)
PMV BLD AUTO: 10.8 FL (ref 7.5–11.1)
POTASSIUM SERPL-SCNC: 3.4 MMOL/L (ref 3.5–5.1)
RBC # BLD: 4.29 M/CU MM (ref 4.6–6.2)
SEGMENTED NEUTROPHILS ABSOLUTE COUNT: 8.7 K/CU MM
SEGMENTED NEUTROPHILS RELATIVE PERCENT: 74.7 % (ref 36–66)
SODIUM BLD-SCNC: 135 MMOL/L (ref 135–145)
TOTAL IMMATURE NEUTOROPHIL: 0.07 K/CU MM
TOTAL NUCLEATED RBC: 0 K/CU MM
TOTAL PROTEIN: 7.6 GM/DL (ref 6.4–8.2)
WBC # BLD: 11.7 K/CU MM (ref 4–10.5)

## 2018-10-06 PROCEDURE — 85025 COMPLETE CBC W/AUTO DIFF WBC: CPT

## 2018-10-06 PROCEDURE — 80053 COMPREHEN METABOLIC PANEL: CPT

## 2018-10-06 PROCEDURE — 6370000000 HC RX 637 (ALT 250 FOR IP): Performed by: EMERGENCY MEDICINE

## 2018-10-06 PROCEDURE — 71045 X-RAY EXAM CHEST 1 VIEW: CPT

## 2018-10-06 PROCEDURE — 2580000003 HC RX 258: Performed by: EMERGENCY MEDICINE

## 2018-10-06 PROCEDURE — 96361 HYDRATE IV INFUSION ADD-ON: CPT

## 2018-10-06 PROCEDURE — 99285 EMERGENCY DEPT VISIT HI MDM: CPT

## 2018-10-06 PROCEDURE — 36415 COLL VENOUS BLD VENIPUNCTURE: CPT

## 2018-10-06 RX ORDER — POLYETHYLENE GLYCOL 3350 17 G/17G
0.4 POWDER, FOR SOLUTION ORAL ONCE
Status: COMPLETED | OUTPATIENT
Start: 2018-10-06 | End: 2018-10-06

## 2018-10-06 RX ORDER — 0.9 % SODIUM CHLORIDE 0.9 %
1000 INTRAVENOUS SOLUTION INTRAVENOUS ONCE
Status: COMPLETED | OUTPATIENT
Start: 2018-10-06 | End: 2018-10-06

## 2018-10-06 RX ORDER — MINERAL OIL 100 G/100G
1 OIL RECTAL ONCE
Status: COMPLETED | OUTPATIENT
Start: 2018-10-06 | End: 2018-10-06

## 2018-10-06 RX ADMIN — SODIUM CHLORIDE 1000 ML: 9 INJECTION, SOLUTION INTRAVENOUS at 20:49

## 2018-10-06 RX ADMIN — MINERAL OIL 1 ENEMA: 100 ENEMA RECTAL at 21:07

## 2018-10-06 RX ADMIN — POLYETHYLENE GLYCOL (3350) 46 G: 17 POWDER, FOR SOLUTION ORAL at 22:10

## 2018-10-06 ASSESSMENT — PAIN SCALES - GENERAL: PAINLEVEL_OUTOF10: 9

## 2018-10-07 ENCOUNTER — APPOINTMENT (OUTPATIENT)
Dept: CT IMAGING | Age: 43
End: 2018-10-07
Payer: COMMERCIAL

## 2018-10-07 ENCOUNTER — APPOINTMENT (OUTPATIENT)
Dept: GENERAL RADIOLOGY | Age: 43
End: 2018-10-07
Payer: COMMERCIAL

## 2018-10-07 PROBLEM — K59.00 CONSTIPATION: Status: ACTIVE | Noted: 2018-10-07

## 2018-10-07 LAB
GLUCOSE BLD-MCNC: 163 MG/DL (ref 70–99)
GLUCOSE BLD-MCNC: 194 MG/DL (ref 70–99)
GLUCOSE BLD-MCNC: 202 MG/DL (ref 70–99)
GLUCOSE BLD-MCNC: 212 MG/DL (ref 70–99)

## 2018-10-07 PROCEDURE — 96374 THER/PROPH/DIAG INJ IV PUSH: CPT

## 2018-10-07 PROCEDURE — 2580000003 HC RX 258: Performed by: EMERGENCY MEDICINE

## 2018-10-07 PROCEDURE — 96367 TX/PROPH/DG ADDL SEQ IV INF: CPT

## 2018-10-07 PROCEDURE — 6370000000 HC RX 637 (ALT 250 FOR IP): Performed by: NURSE PRACTITIONER

## 2018-10-07 PROCEDURE — 6370000000 HC RX 637 (ALT 250 FOR IP): Performed by: INTERNAL MEDICINE

## 2018-10-07 PROCEDURE — 2580000003 HC RX 258: Performed by: HOSPITALIST

## 2018-10-07 PROCEDURE — 2500000003 HC RX 250 WO HCPCS: Performed by: HOSPITALIST

## 2018-10-07 PROCEDURE — 82962 GLUCOSE BLOOD TEST: CPT

## 2018-10-07 PROCEDURE — G0378 HOSPITAL OBSERVATION PER HR: HCPCS

## 2018-10-07 PROCEDURE — 6370000000 HC RX 637 (ALT 250 FOR IP): Performed by: HOSPITALIST

## 2018-10-07 PROCEDURE — 6360000002 HC RX W HCPCS: Performed by: EMERGENCY MEDICINE

## 2018-10-07 PROCEDURE — 2580000003 HC RX 258: Performed by: INTERNAL MEDICINE

## 2018-10-07 PROCEDURE — 96375 TX/PRO/DX INJ NEW DRUG ADDON: CPT

## 2018-10-07 PROCEDURE — 6360000004 HC RX CONTRAST MEDICATION: Performed by: EMERGENCY MEDICINE

## 2018-10-07 PROCEDURE — 6360000002 HC RX W HCPCS: Performed by: INTERNAL MEDICINE

## 2018-10-07 PROCEDURE — 74018 RADEX ABDOMEN 1 VIEW: CPT

## 2018-10-07 PROCEDURE — 74177 CT ABD & PELVIS W/CONTRAST: CPT

## 2018-10-07 PROCEDURE — 96365 THER/PROPH/DIAG IV INF INIT: CPT

## 2018-10-07 RX ORDER — BLOOD-GLUCOSE METER
1 KIT MISCELLANEOUS ONCE
Status: DISCONTINUED | OUTPATIENT
Start: 2018-10-07 | End: 2018-10-07

## 2018-10-07 RX ORDER — NICOTINE POLACRILEX 4 MG
15 LOZENGE BUCCAL PRN
Status: DISCONTINUED | OUTPATIENT
Start: 2018-10-07 | End: 2018-10-08 | Stop reason: HOSPADM

## 2018-10-07 RX ORDER — LANCETS 30 GAUGE
1 EACH MISCELLANEOUS DAILY
Status: DISCONTINUED | OUTPATIENT
Start: 2018-10-07 | End: 2018-10-07

## 2018-10-07 RX ORDER — SODIUM PHOSPHATE, DIBASIC AND SODIUM PHOSPHATE, MONOBASIC 7; 19 G/133ML; G/133ML
1 ENEMA RECTAL ONCE
Status: COMPLETED | OUTPATIENT
Start: 2018-10-07 | End: 2018-10-07

## 2018-10-07 RX ORDER — METRONIDAZOLE 250 MG/1
500 TABLET ORAL 3 TIMES DAILY
Status: DISCONTINUED | OUTPATIENT
Start: 2018-10-07 | End: 2018-10-08 | Stop reason: HOSPADM

## 2018-10-07 RX ORDER — DEXTROSE MONOHYDRATE 50 MG/ML
100 INJECTION, SOLUTION INTRAVENOUS PRN
Status: DISCONTINUED | OUTPATIENT
Start: 2018-10-07 | End: 2018-10-08 | Stop reason: HOSPADM

## 2018-10-07 RX ORDER — ONDANSETRON 2 MG/ML
4 INJECTION INTRAMUSCULAR; INTRAVENOUS EVERY 30 MIN PRN
Status: DISCONTINUED | OUTPATIENT
Start: 2018-10-07 | End: 2018-10-08 | Stop reason: HOSPADM

## 2018-10-07 RX ORDER — ONDANSETRON 4 MG/1
4 TABLET, ORALLY DISINTEGRATING ORAL EVERY 8 HOURS PRN
Status: DISCONTINUED | OUTPATIENT
Start: 2018-10-07 | End: 2018-10-08 | Stop reason: HOSPADM

## 2018-10-07 RX ORDER — SODIUM CHLORIDE 0.9 % (FLUSH) 0.9 %
10 SYRINGE (ML) INJECTION 2 TIMES DAILY
Status: DISCONTINUED | OUTPATIENT
Start: 2018-10-07 | End: 2018-10-08 | Stop reason: HOSPADM

## 2018-10-07 RX ORDER — DEXTROSE MONOHYDRATE 25 G/50ML
12.5 INJECTION, SOLUTION INTRAVENOUS PRN
Status: DISCONTINUED | OUTPATIENT
Start: 2018-10-07 | End: 2018-10-08 | Stop reason: HOSPADM

## 2018-10-07 RX ORDER — ASPIRIN 81 MG/1
81 TABLET, CHEWABLE ORAL DAILY
Status: DISCONTINUED | OUTPATIENT
Start: 2018-10-07 | End: 2018-10-08 | Stop reason: HOSPADM

## 2018-10-07 RX ORDER — SODIUM CHLORIDE 9 MG/ML
INJECTION, SOLUTION INTRAVENOUS CONTINUOUS
Status: DISPENSED | OUTPATIENT
Start: 2018-10-07 | End: 2018-10-08

## 2018-10-07 RX ORDER — PROMETHAZINE HYDROCHLORIDE 25 MG/1
25 TABLET ORAL EVERY 6 HOURS PRN
Status: DISCONTINUED | OUTPATIENT
Start: 2018-10-07 | End: 2018-10-08 | Stop reason: HOSPADM

## 2018-10-07 RX ORDER — INSULIN GLARGINE 100 [IU]/ML
70 INJECTION, SOLUTION SUBCUTANEOUS NIGHTLY
Status: DISCONTINUED | OUTPATIENT
Start: 2018-10-07 | End: 2018-10-08 | Stop reason: HOSPADM

## 2018-10-07 RX ORDER — LACTULOSE 10 G/15ML
20 SOLUTION ORAL
Status: DISCONTINUED | OUTPATIENT
Start: 2018-10-07 | End: 2018-10-07

## 2018-10-07 RX ORDER — LUBIPROSTONE 24 UG/1
24 CAPSULE, GELATIN COATED ORAL 2 TIMES DAILY WITH MEALS
Status: DISCONTINUED | OUTPATIENT
Start: 2018-10-07 | End: 2018-10-08 | Stop reason: HOSPADM

## 2018-10-07 RX ORDER — SENNA AND DOCUSATE SODIUM 50; 8.6 MG/1; MG/1
2 TABLET, FILM COATED ORAL NIGHTLY
Status: DISCONTINUED | OUTPATIENT
Start: 2018-10-07 | End: 2018-10-07

## 2018-10-07 RX ORDER — METOCLOPRAMIDE HYDROCHLORIDE 5 MG/ML
10 INJECTION INTRAMUSCULAR; INTRAVENOUS ONCE
Status: DISCONTINUED | OUTPATIENT
Start: 2018-10-07 | End: 2018-10-07

## 2018-10-07 RX ORDER — HYDRALAZINE HYDROCHLORIDE 25 MG/1
25 TABLET, FILM COATED ORAL EVERY 8 HOURS SCHEDULED
Status: DISCONTINUED | OUTPATIENT
Start: 2018-10-07 | End: 2018-10-08 | Stop reason: HOSPADM

## 2018-10-07 RX ORDER — TIZANIDINE 4 MG/1
4 TABLET ORAL 3 TIMES DAILY
Status: DISCONTINUED | OUTPATIENT
Start: 2018-10-07 | End: 2018-10-08 | Stop reason: HOSPADM

## 2018-10-07 RX ADMIN — PROMETHAZINE HYDROCHLORIDE 25 MG: 25 TABLET ORAL at 08:01

## 2018-10-07 RX ADMIN — IOPAMIDOL 80 ML: 755 INJECTION, SOLUTION INTRAVENOUS at 00:44

## 2018-10-07 RX ADMIN — LUBIPROSTONE 24 MCG: 24 CAPSULE, GELATIN COATED ORAL at 12:26

## 2018-10-07 RX ADMIN — HYDRALAZINE HYDROCHLORIDE 25 MG: 25 TABLET, FILM COATED ORAL at 14:42

## 2018-10-07 RX ADMIN — SODIUM CHLORIDE, PRESERVATIVE FREE 10 ML: 5 INJECTION INTRAVENOUS at 00:45

## 2018-10-07 RX ADMIN — LABETALOL HYDROCHLORIDE 20 MG: 5 INJECTION, SOLUTION INTRAVENOUS at 07:54

## 2018-10-07 RX ADMIN — MAGESIUM CITRATE 296 ML: 1.75 LIQUID ORAL at 10:23

## 2018-10-07 RX ADMIN — SODIUM CHLORIDE, PRESERVATIVE FREE 10 ML: 5 INJECTION INTRAVENOUS at 10:23

## 2018-10-07 RX ADMIN — CEFTRIAXONE SODIUM 2 G: 2 INJECTION, POWDER, FOR SOLUTION INTRAMUSCULAR; INTRAVENOUS at 04:03

## 2018-10-07 RX ADMIN — ASPIRIN 81 MG CHEWABLE TABLET 81 MG: 81 TABLET CHEWABLE at 10:22

## 2018-10-07 RX ADMIN — INSULIN LISPRO 10 UNITS: 100 INJECTION, SOLUTION INTRAVENOUS; SUBCUTANEOUS at 14:35

## 2018-10-07 RX ADMIN — TIZANIDINE 4 MG: 4 TABLET ORAL at 23:58

## 2018-10-07 RX ADMIN — SODIUM CHLORIDE: 9 INJECTION, SOLUTION INTRAVENOUS at 23:57

## 2018-10-07 RX ADMIN — METRONIDAZOLE 500 MG: 250 TABLET ORAL at 10:22

## 2018-10-07 RX ADMIN — LUBIPROSTONE 24 MCG: 24 CAPSULE, GELATIN COATED ORAL at 17:31

## 2018-10-07 RX ADMIN — SODIUM PHOSPHATE, DIBASIC AND SODIUM PHOSPHATE, MONOBASIC 1 ENEMA: 7; 19 ENEMA RECTAL at 12:01

## 2018-10-07 RX ADMIN — LACTULOSE 20 G: 10 SOLUTION ORAL at 08:28

## 2018-10-07 RX ADMIN — ONDANSETRON 4 MG: 2 INJECTION INTRAMUSCULAR; INTRAVENOUS at 01:24

## 2018-10-07 RX ADMIN — TIZANIDINE 4 MG: 4 TABLET ORAL at 14:42

## 2018-10-07 RX ADMIN — TIZANIDINE 4 MG: 4 TABLET ORAL at 10:23

## 2018-10-07 RX ADMIN — HYDRALAZINE HYDROCHLORIDE 25 MG: 25 TABLET, FILM COATED ORAL at 05:35

## 2018-10-07 RX ADMIN — MAGESIUM CITRATE 296 ML: 1.75 LIQUID ORAL at 12:00

## 2018-10-07 RX ADMIN — ONDANSETRON 4 MG: 4 TABLET, ORALLY DISINTEGRATING ORAL at 03:57

## 2018-10-07 RX ADMIN — SODIUM CHLORIDE: 9 INJECTION, SOLUTION INTRAVENOUS at 16:22

## 2018-10-07 RX ADMIN — INSULIN LISPRO 10 UNITS: 100 INJECTION, SOLUTION INTRAVENOUS; SUBCUTANEOUS at 18:57

## 2018-10-07 RX ADMIN — INSULIN GLARGINE 70 UNITS: 100 INJECTION, SOLUTION SUBCUTANEOUS at 23:58

## 2018-10-07 RX ADMIN — ONDANSETRON 4 MG: 4 TABLET, ORALLY DISINTEGRATING ORAL at 11:59

## 2018-10-07 RX ADMIN — SODIUM PHOSPHATE, DIBASIC AND SODIUM PHOSPHATE, MONOBASIC 1 ENEMA: 7; 19 ENEMA RECTAL at 17:16

## 2018-10-07 RX ADMIN — METRONIDAZOLE 500 MG: 250 TABLET ORAL at 23:58

## 2018-10-07 RX ADMIN — LACTULOSE 20 G: 10 SOLUTION ORAL at 03:47

## 2018-10-07 RX ADMIN — LINAGLIPTIN 5 MG: 5 TABLET, FILM COATED ORAL at 10:22

## 2018-10-07 RX ADMIN — METRONIDAZOLE 500 MG: 250 TABLET ORAL at 14:42

## 2018-10-07 ASSESSMENT — PAIN SCALES - GENERAL
PAINLEVEL_OUTOF10: 0
PAINLEVEL_OUTOF10: 0

## 2018-10-07 NOTE — ED NOTES
Bed: ED-21  Expected date:   Expected time:   Means of arrival:   Comments:  Medic 1 43F abdominal pain,SOB     Shi Mercado, ROBIN  10/06/18 2037

## 2018-10-07 NOTE — ED PROVIDER NOTES
Tarsometatarsal joints demonstrate normal alignment. The Lisfranc ligament is intact. Focal soft tissue ulceration along the dorsal aspect of the foot at the level of the 4th digit with large underlying subcutaneous phlegmonous fluid collection measuring 4.3 x 4.9 x 1.5 cm likely representing an abscess. Extensive cellulitis of the foot with myositis of the intrinsic musculature. Marrow edema in the 3rd and 4th metatarsals as well as the cuneiform and cuboid bones suggesting reactive marrow edema. No definite evidence of marrow replacement although early osteomyelitis in the 3rd and 4th metatarsals cannot be completely excluded. Small 4th MTP joint effusion without significant synovitis. Reactive marrow edema in the 4th proximal phalangeal base as well as the 4th metatarsal head is noted without marrow replacement. This suggests reactive marrow edema although early osteomyelitis and septic arthritis cannot be completely excluded. Soft tissue ulceration along the plantar aspect of the foot without evidence of underlying abscess. There is cellulitis of the plantar soft tissues of the foot as well. The findings were sent to the Radiology Results [address] at 11:33 am on 9/17/2018to be communicated to a licensed caregiver. Vl Dup Lower Extremity Arteries Bilateral    Result Date: 9/18/2018  EXAMINATION: ARTERIAL DUPLEX ULTRASOUND OF THE BILATERAL LOWER EXTREMITIES  9/16/2018 9:02 pm TECHNIQUE: Duplex ultrasound and Doppler images were obtained of the bilateral lower extremities COMPARISON: Ultrasound of the right lower extremity dated December 4, 2017. HISTORY: ORDERING SYSTEM PROVIDED HISTORY: Lower extremity wounds, PAD TECHNOLOGIST PROVIDED HISTORY: Ordering Physician Provided Reason for Exam: lower extermity wounds, PAD Acuity: Unknown Type of Encounter: Initial FINDINGS: Multiple lymph nodes are seen within the left groin with the largest measuring 3.3 x 1.9 x 1.8 cm.  Right: Flow velocities

## 2018-10-08 VITALS
BODY MASS INDEX: 35.8 KG/M2 | HEART RATE: 79 BPM | TEMPERATURE: 98 F | WEIGHT: 250.1 LBS | OXYGEN SATURATION: 99 % | RESPIRATION RATE: 17 BRPM | DIASTOLIC BLOOD PRESSURE: 85 MMHG | HEIGHT: 70 IN | SYSTOLIC BLOOD PRESSURE: 149 MMHG

## 2018-10-08 LAB
GLUCOSE BLD-MCNC: 139 MG/DL (ref 70–99)
GLUCOSE BLD-MCNC: 193 MG/DL (ref 70–99)

## 2018-10-08 PROCEDURE — 2580000003 HC RX 258: Performed by: INTERNAL MEDICINE

## 2018-10-08 PROCEDURE — G0378 HOSPITAL OBSERVATION PER HR: HCPCS

## 2018-10-08 PROCEDURE — 6360000002 HC RX W HCPCS: Performed by: INTERNAL MEDICINE

## 2018-10-08 PROCEDURE — 97605 NEG PRS WND THER DME<=50SQCM: CPT

## 2018-10-08 PROCEDURE — 82962 GLUCOSE BLOOD TEST: CPT

## 2018-10-08 PROCEDURE — 2580000003 HC RX 258: Performed by: EMERGENCY MEDICINE

## 2018-10-08 PROCEDURE — 6370000000 HC RX 637 (ALT 250 FOR IP): Performed by: NURSE PRACTITIONER

## 2018-10-08 PROCEDURE — 96366 THER/PROPH/DIAG IV INF ADDON: CPT

## 2018-10-08 PROCEDURE — 6370000000 HC RX 637 (ALT 250 FOR IP): Performed by: INTERNAL MEDICINE

## 2018-10-08 RX ORDER — HYDRALAZINE HYDROCHLORIDE 25 MG/1
25 TABLET, FILM COATED ORAL EVERY 8 HOURS SCHEDULED
Qty: 90 TABLET | Refills: 0 | Status: SHIPPED | OUTPATIENT
Start: 2018-10-08 | End: 2019-01-10 | Stop reason: DRUGHIGH

## 2018-10-08 RX ORDER — LUBIPROSTONE 24 UG/1
24 CAPSULE, GELATIN COATED ORAL 2 TIMES DAILY WITH MEALS
Qty: 60 CAPSULE | Refills: 0 | Status: SHIPPED | OUTPATIENT
Start: 2018-10-08 | End: 2019-01-10

## 2018-10-08 RX ADMIN — LINAGLIPTIN 5 MG: 5 TABLET, FILM COATED ORAL at 11:41

## 2018-10-08 RX ADMIN — SODIUM CHLORIDE, PRESERVATIVE FREE 10 ML: 5 INJECTION INTRAVENOUS at 11:42

## 2018-10-08 RX ADMIN — HYDRALAZINE HYDROCHLORIDE 25 MG: 25 TABLET, FILM COATED ORAL at 15:20

## 2018-10-08 RX ADMIN — METRONIDAZOLE 500 MG: 250 TABLET ORAL at 11:41

## 2018-10-08 RX ADMIN — CEFTRIAXONE SODIUM 2 G: 2 INJECTION, POWDER, FOR SOLUTION INTRAMUSCULAR; INTRAVENOUS at 05:19

## 2018-10-08 RX ADMIN — ASPIRIN 81 MG CHEWABLE TABLET 81 MG: 81 TABLET CHEWABLE at 11:40

## 2018-10-08 RX ADMIN — LUBIPROSTONE 24 MCG: 24 CAPSULE, GELATIN COATED ORAL at 11:40

## 2018-10-08 RX ADMIN — METRONIDAZOLE 500 MG: 250 TABLET ORAL at 15:20

## 2018-10-08 RX ADMIN — TIZANIDINE 4 MG: 4 TABLET ORAL at 11:41

## 2018-10-08 RX ADMIN — INSULIN LISPRO 20 UNITS: 100 INJECTION, SOLUTION INTRAVENOUS; SUBCUTANEOUS at 13:04

## 2018-10-08 RX ADMIN — TIZANIDINE 4 MG: 4 TABLET ORAL at 15:20

## 2018-10-08 NOTE — PROGRESS NOTES
K59.00     Assessment:  Constipation  May be opioid induced      RECOMMENDATIONS:  KUB today, mild to moderate. Patient feeling much better  Recommend Amitiza 24 mcg BID  Encourage ambulate  High fiber diet  8 glasses of water a day    Continue Amitiza 24 mcg BID outpatient, f/u in office in 1-2 weeks    Discussed plan of care with patient      Patient clinical, biochemical, and radiological information discussed with Dr. Luis Downs. He agrees with the assessment and plan. Tonya Zhang CNP  10/8/2018  11:31 AM     I have seen and examined this patient personally, and independently of the nurse practitioner. The plan was developed mutually at the time of the visit with the patient. Becky Nunez and myself have spoken with patient, nursing staff and provided written and verbal instructions .     The above note has been reviewed and I agree with the Assessment,  Diagnosis, and Treatment plan as suggested by Becky Nunez CNP      [address] [facility] gastroenterology

## 2018-10-08 NOTE — CONSULTS
6/2/2011    Pandiverticulosis, Nonbleeding internal hemorrhoids, Repeat colonoscopy at age 48- Dr. La Moore    \"had reconstruction surgery on nose a year after the other nose surgery\"    OTHER SURGICAL HISTORY Right 05/22/2015    I & D right great toe with partial amputation    OTHER SURGICAL HISTORY  12/04/2017    inc and drainage of abcess    RI DEEP INCIS FOOT BONE INFECTN Left 9/22/2018    LEFT FOOT DEBRIDEMENT INCISION AND DRAINAGE TOP AND BOTTOM performed by Ayse Rosario DPM at [address] [place] [address]    sebaceous cyst removal times 4     TOE AMPUTATION      right great toe    TONSILLECTOMY AND ADENOIDECTOMY  1988    UPPER GASTROINTESTINAL ENDOSCOPY  06/2/2011    Mild gastritis with moderatley severe antritis, small hiatal hernia, Dr. Shanta Giles History   Problem Relation Age of Onset    Cancer Mother         ?site    Stroke Mother     Bleeding Prob Mother     Diabetes Father     Heart Disease Father     High Blood Pressure Father     Obesity Father     Kidney Disease Father     Diabetes Sister     High Blood Pressure Sister     Mental Illness Sister     Obesity Sister     High Blood Pressure Other     Mental Illness Other         bipolar    Other Son         cyst in ear canal    ADHD Daughter        SOCIAL HISTORY    Social History   Substance Use Topics    Smoking status: Former Smoker     Years: 20.00     Quit date: 11/18/2017    Smokeless tobacco: Never Used    Alcohol use No      Comment: 4 cans soda daily, 2 cups tea daily/alchohol- average one drink per month       ALLERGIES    Allergies   Allergen Reactions    Adhesive Tape Rash    Doxycycline Nausea And Vomiting    Reglan [Metoclopramide] Anxiety       MEDICATIONS    No current facility-administered medications on file prior to encounter.       Current Outpatient Prescriptions on File Prior to Encounter   Medication Sig Dispense Refill    ondansetron (Shameka Chan) 4

## 2018-10-08 NOTE — DISCHARGE SUMMARY
evaluation and treatment, VAC is AT this time will have wound care evaluate and replace      Hospital Course:  Mr. Eulogio May is a 55-year-old male who presented to the emergency department due to constipation ×6-7 days. Patient believes that his opiate induced in which she was taken medication Percocet for chronic pain. He has stopped taking the medication before being admitted to the hospital.  He also has a past medical history of irritable bowel syndrome constipation. He was placed in observation on the hospitalist service with GI consultation. Patient took a combination of medication to effectively have 3 large stools while hospitalized. At this time he is medically stable to be discharged to home and he will follow-up with his primary care provider in the outpatient basis. He is also to refrain from opiates at this time. He denies any abdominal pain no constipation no nausea or vomiting. Patient is eating and drinking without any abdominal pain. Physical Exam:   /69   Pulse 82   Temp 97.8 °F (36.6 °C) (Oral)   Resp 16   Ht 5' 10\" (1.778 m)   Wt 250 lb 1.6 oz (113.4 kg)   SpO2 96%   BMI 35.89 kg/m²   Neck: no JVD  Lungs: equal BS, clear   CV: RRR, normal S1S2, no significant murmur  Abdomen: soft, nontender, normally active BS, no masses or tenderness  Extremities: no edema or cords  Neurologic: alert, oriented, no focal CN or motor deficit        Medications: see computerized discharge medication list     Medication List      START taking these medications    hydrALAZINE 25 MG tablet  Commonly known as:  APRESOLINE  Take 1 tablet by mouth every 8 hours     lubiprostone 24 MCG capsule  Commonly known as:  AMITIZA  Take 1 capsule by mouth 2 times daily (with meals)        CHANGE how you take these medications    glucose monitoring kit monitoring kit  1 each by Does not apply route once for 1 dose. What changed:  Another medication with the same name was removed.  Continue taking this medication, and follow the directions you see here.         CONTINUE taking these medications    aspirin 81 MG chewable tablet  Take 1 tablet by mouth daily     insulin glargine 100 UNIT/ML injection pen  Commonly known as:  BASAGLAR KWIKPEN  Inject 70 Units into the skin nightly     insulin lispro 100 UNIT/ML pen  Commonly known as:  HUMALOG KWIKPEN  Inject 20 Units into the skin 3 times daily (before meals)     Insulin Pen Needle 32G X 5 MM Misc  1 each by Does not apply route daily     Lancets Misc  1 each by Does not apply route daily     linagliptin 5 MG tablet  Commonly known as:  TRADJENTA  Take 1 tablet by mouth daily     metFORMIN 850 MG tablet  Commonly known as:  GLUCOPHAGE  Take 1 tablet by mouth 2 times daily (with meals)     ondansetron 4 MG tablet  Commonly known as:  ZOFRAN  Take 1 tablet by mouth every 8 hours as needed for Nausea or Vomiting     promethazine 25 MG tablet  Commonly known as:  PHENERGAN     tiZANidine 4 MG tablet  Commonly known as:  Gely Fix        STOP taking these medications    cefTRIAXone infusion  Commonly known as:  ROCEPHIN     metroNIDAZOLE 500 MG tablet  Commonly known as:  FLAGYL     oxyCODONE-acetaminophen 7.5-325 MG per tablet  Commonly known as:  PERCOCET           Where to Get Your Medications      You can get these medications from any pharmacy    Bring a paper prescription for each of these medications  · hydrALAZINE 25 MG tablet  · lubiprostone 24 MCG capsule       Patient Instructions: Resume home medication and any changes while in the hospital      Discharged Condition: good  Disposition: home  Activity: activity as tolerated  Diet: diabetic diet  Wound Care: keep wound clean and dry    Follow-up: Dr. Samra De La Rosa in 1-2 weeks  Wound care w/ wound vac and home health care        Electronically signed by Jorge L Renae MD on 10/8/2018 at 10:06 AM    Time spent on discharge 15 minutes

## 2018-10-10 ENCOUNTER — HOSPITAL ENCOUNTER (OUTPATIENT)
Age: 43
Setting detail: SPECIMEN
Discharge: HOME | End: 2018-10-10
Payer: COMMERCIAL

## 2018-10-10 LAB
ANION GAP SERPL CALCULATED.3IONS-SCNC: 12 MMOL/L (ref 4–16)
BASOPHILS ABSOLUTE: 0.1 K/CU MM
BASOPHILS RELATIVE PERCENT: 0.8 % (ref 0–1)
BUN BLDV-MCNC: 24 MG/DL (ref 6–23)
CALCIUM SERPL-MCNC: 8.3 MG/DL (ref 8.3–10.6)
CHLORIDE BLD-SCNC: 101 MMOL/L (ref 99–110)
CO2: 24 MMOL/L (ref 21–32)
CREAT SERPL-MCNC: 0.9 MG/DL (ref 0.9–1.3)
DIFFERENTIAL TYPE: ABNORMAL
EOSINOPHILS ABSOLUTE: 0.4 K/CU MM
EOSINOPHILS RELATIVE PERCENT: 5.4 % (ref 0–3)
GFR AFRICAN AMERICAN: >60 ML/MIN/1.73M2
GFR NON-AFRICAN AMERICAN: >60 ML/MIN/1.73M2
GLUCOSE BLD-MCNC: 205 MG/DL (ref 70–99)
HCT VFR BLD CALC: 37.1 % (ref 42–52)
HEMOGLOBIN: 12.5 GM/DL (ref 13.5–18)
IMMATURE NEUTROPHIL %: 0.3 % (ref 0–0.43)
LYMPHOCYTES ABSOLUTE: 1.3 K/CU MM
LYMPHOCYTES RELATIVE PERCENT: 16.7 % (ref 24–44)
MCH RBC QN AUTO: 30.3 PG (ref 27–31)
MCHC RBC AUTO-ENTMCNC: 33.7 % (ref 32–36)
MCV RBC AUTO: 90 FL (ref 78–100)
MONOCYTES ABSOLUTE: 0.6 K/CU MM
MONOCYTES RELATIVE PERCENT: 7 % (ref 0–4)
NUCLEATED RBC %: 0 %
PDW BLD-RTO: 13.3 % (ref 11.7–14.9)
PLATELET # BLD: 213 K/CU MM (ref 140–440)
PMV BLD AUTO: 11 FL (ref 7.5–11.1)
POTASSIUM SERPL-SCNC: 4.4 MMOL/L (ref 3.5–5.1)
RBC # BLD: 4.12 M/CU MM (ref 4.6–6.2)
SEGMENTED NEUTROPHILS ABSOLUTE COUNT: 5.5 K/CU MM
SEGMENTED NEUTROPHILS RELATIVE PERCENT: 69.8 % (ref 36–66)
SODIUM BLD-SCNC: 137 MMOL/L (ref 135–145)
TOTAL IMMATURE NEUTOROPHIL: 0.02 K/CU MM
TOTAL NUCLEATED RBC: 0 K/CU MM
WBC # BLD: 7.9 K/CU MM (ref 4–10.5)

## 2018-10-10 PROCEDURE — 85025 COMPLETE CBC W/AUTO DIFF WBC: CPT

## 2018-10-10 PROCEDURE — 80048 BASIC METABOLIC PNL TOTAL CA: CPT

## 2018-10-15 ENCOUNTER — HOSPITAL ENCOUNTER (OUTPATIENT)
Age: 43
Setting detail: SPECIMEN
Discharge: HOME | End: 2018-10-15
Payer: COMMERCIAL

## 2018-10-15 LAB
ANION GAP SERPL CALCULATED.3IONS-SCNC: 13 MMOL/L (ref 4–16)
BASOPHILS ABSOLUTE: 0.1 K/CU MM
BASOPHILS RELATIVE PERCENT: 0.8 % (ref 0–1)
BUN BLDV-MCNC: 15 MG/DL (ref 6–23)
CALCIUM SERPL-MCNC: 8.5 MG/DL (ref 8.3–10.6)
CHLORIDE BLD-SCNC: 98 MMOL/L (ref 99–110)
CO2: 24 MMOL/L (ref 21–32)
CREAT SERPL-MCNC: 0.7 MG/DL (ref 0.9–1.3)
DIFFERENTIAL TYPE: ABNORMAL
EOSINOPHILS ABSOLUTE: 0.4 K/CU MM
EOSINOPHILS RELATIVE PERCENT: 5 % (ref 0–3)
GFR AFRICAN AMERICAN: >60 ML/MIN/1.73M2
GFR NON-AFRICAN AMERICAN: >60 ML/MIN/1.73M2
GLUCOSE BLD-MCNC: 187 MG/DL (ref 70–99)
HCT VFR BLD CALC: 38.3 % (ref 42–52)
HEMOGLOBIN: 13.1 GM/DL (ref 13.5–18)
IMMATURE NEUTROPHIL %: 0.7 % (ref 0–0.43)
LYMPHOCYTES ABSOLUTE: 1.2 K/CU MM
LYMPHOCYTES RELATIVE PERCENT: 15.7 % (ref 24–44)
MCH RBC QN AUTO: 30.5 PG (ref 27–31)
MCHC RBC AUTO-ENTMCNC: 34.2 % (ref 32–36)
MCV RBC AUTO: 89.1 FL (ref 78–100)
MONOCYTES ABSOLUTE: 0.7 K/CU MM
MONOCYTES RELATIVE PERCENT: 9.3 % (ref 0–4)
NUCLEATED RBC %: 0 %
PDW BLD-RTO: 13.7 % (ref 11.7–14.9)
PLATELET # BLD: 201 K/CU MM (ref 140–440)
PMV BLD AUTO: 11.1 FL (ref 7.5–11.1)
POTASSIUM SERPL-SCNC: 4.2 MMOL/L (ref 3.5–5.1)
RBC # BLD: 4.3 M/CU MM (ref 4.6–6.2)
SEGMENTED NEUTROPHILS ABSOLUTE COUNT: 5 K/CU MM
SEGMENTED NEUTROPHILS RELATIVE PERCENT: 68.5 % (ref 36–66)
SODIUM BLD-SCNC: 135 MMOL/L (ref 135–145)
TOTAL IMMATURE NEUTOROPHIL: 0.05 K/CU MM
TOTAL NUCLEATED RBC: 0 K/CU MM
WBC # BLD: 7.3 K/CU MM (ref 4–10.5)

## 2018-10-15 PROCEDURE — 85025 COMPLETE CBC W/AUTO DIFF WBC: CPT

## 2018-10-15 PROCEDURE — 80048 BASIC METABOLIC PNL TOTAL CA: CPT

## 2018-10-22 ENCOUNTER — HOSPITAL ENCOUNTER (OUTPATIENT)
Age: 43
Setting detail: SPECIMEN
Discharge: HOME | End: 2018-10-22
Payer: COMMERCIAL

## 2018-10-22 LAB
ANION GAP SERPL CALCULATED.3IONS-SCNC: 13 MMOL/L (ref 4–16)
BASOPHILS ABSOLUTE: 0 K/CU MM
BASOPHILS RELATIVE PERCENT: 0.6 % (ref 0–1)
BUN BLDV-MCNC: 24 MG/DL (ref 6–23)
CALCIUM SERPL-MCNC: 8.9 MG/DL (ref 8.3–10.6)
CHLORIDE BLD-SCNC: 98 MMOL/L (ref 99–110)
CO2: 24 MMOL/L (ref 21–32)
CREAT SERPL-MCNC: 0.9 MG/DL (ref 0.9–1.3)
DIFFERENTIAL TYPE: ABNORMAL
EOSINOPHILS ABSOLUTE: 0.3 K/CU MM
EOSINOPHILS RELATIVE PERCENT: 4.3 % (ref 0–3)
GFR AFRICAN AMERICAN: >60 ML/MIN/1.73M2
GFR NON-AFRICAN AMERICAN: >60 ML/MIN/1.73M2
GLUCOSE BLD-MCNC: 179 MG/DL (ref 70–99)
HCT VFR BLD CALC: 40.8 % (ref 42–52)
HEMOGLOBIN: 13.5 GM/DL (ref 13.5–18)
IMMATURE NEUTROPHIL %: 0.4 % (ref 0–0.43)
LYMPHOCYTES ABSOLUTE: 1.3 K/CU MM
LYMPHOCYTES RELATIVE PERCENT: 18.7 % (ref 24–44)
MCH RBC QN AUTO: 29.8 PG (ref 27–31)
MCHC RBC AUTO-ENTMCNC: 33.1 % (ref 32–36)
MCV RBC AUTO: 90.1 FL (ref 78–100)
MONOCYTES ABSOLUTE: 0.6 K/CU MM
MONOCYTES RELATIVE PERCENT: 8.7 % (ref 0–4)
NUCLEATED RBC %: 0 %
PDW BLD-RTO: 13.6 % (ref 11.7–14.9)
PLATELET # BLD: 152 K/CU MM (ref 140–440)
PMV BLD AUTO: 11.4 FL (ref 7.5–11.1)
POTASSIUM SERPL-SCNC: 4.4 MMOL/L (ref 3.5–5.1)
RBC # BLD: 4.53 M/CU MM (ref 4.6–6.2)
SEGMENTED NEUTROPHILS ABSOLUTE COUNT: 4.7 K/CU MM
SEGMENTED NEUTROPHILS RELATIVE PERCENT: 67.3 % (ref 36–66)
SODIUM BLD-SCNC: 135 MMOL/L (ref 135–145)
TOTAL IMMATURE NEUTOROPHIL: 0.03 K/CU MM
TOTAL NUCLEATED RBC: 0 K/CU MM
WBC # BLD: 7 K/CU MM (ref 4–10.5)

## 2018-10-22 PROCEDURE — 85025 COMPLETE CBC W/AUTO DIFF WBC: CPT

## 2018-10-22 PROCEDURE — 80048 BASIC METABOLIC PNL TOTAL CA: CPT

## 2018-10-29 ENCOUNTER — HOSPITAL ENCOUNTER (OUTPATIENT)
Age: 43
Setting detail: SPECIMEN
Discharge: HOME | End: 2018-10-29
Payer: COMMERCIAL

## 2018-10-29 LAB
ANION GAP SERPL CALCULATED.3IONS-SCNC: 11 MMOL/L (ref 4–16)
BASOPHILS ABSOLUTE: 0.1 K/CU MM
BASOPHILS RELATIVE PERCENT: 1.2 % (ref 0–1)
BUN BLDV-MCNC: 22 MG/DL (ref 6–23)
CALCIUM SERPL-MCNC: 8.9 MG/DL (ref 8.3–10.6)
CHLORIDE BLD-SCNC: 100 MMOL/L (ref 99–110)
CO2: 24 MMOL/L (ref 21–32)
CREAT SERPL-MCNC: 0.8 MG/DL (ref 0.9–1.3)
DIFFERENTIAL TYPE: ABNORMAL
EOSINOPHILS ABSOLUTE: 0.2 K/CU MM
EOSINOPHILS RELATIVE PERCENT: 3.8 % (ref 0–3)
GFR AFRICAN AMERICAN: >60 ML/MIN/1.73M2
GFR NON-AFRICAN AMERICAN: >60 ML/MIN/1.73M2
GLUCOSE BLD-MCNC: 227 MG/DL (ref 70–99)
HCT VFR BLD CALC: 38.6 % (ref 42–52)
HEMOGLOBIN: 13.2 GM/DL (ref 13.5–18)
IMMATURE NEUTROPHIL %: 0.5 % (ref 0–0.43)
LYMPHOCYTES ABSOLUTE: 1.3 K/CU MM
LYMPHOCYTES RELATIVE PERCENT: 21.5 % (ref 24–44)
MCH RBC QN AUTO: 30.7 PG (ref 27–31)
MCHC RBC AUTO-ENTMCNC: 34.2 % (ref 32–36)
MCV RBC AUTO: 89.8 FL (ref 78–100)
MONOCYTES ABSOLUTE: 0.6 K/CU MM
MONOCYTES RELATIVE PERCENT: 10.5 % (ref 0–4)
NUCLEATED RBC %: 0 %
PDW BLD-RTO: 13.2 % (ref 11.7–14.9)
PLATELET # BLD: 218 K/CU MM (ref 140–440)
PMV BLD AUTO: 11.4 FL (ref 7.5–11.1)
POTASSIUM SERPL-SCNC: 4.9 MMOL/L (ref 3.5–5.1)
RBC # BLD: 4.3 M/CU MM (ref 4.6–6.2)
SEGMENTED NEUTROPHILS ABSOLUTE COUNT: 3.8 K/CU MM
SEGMENTED NEUTROPHILS RELATIVE PERCENT: 62.5 % (ref 36–66)
SODIUM BLD-SCNC: 135 MMOL/L (ref 135–145)
TOTAL IMMATURE NEUTOROPHIL: 0.03 K/CU MM
TOTAL NUCLEATED RBC: 0 K/CU MM
WBC # BLD: 6 K/CU MM (ref 4–10.5)

## 2018-10-29 PROCEDURE — 85025 COMPLETE CBC W/AUTO DIFF WBC: CPT

## 2018-10-29 PROCEDURE — 80048 BASIC METABOLIC PNL TOTAL CA: CPT

## 2018-11-01 ENCOUNTER — TELEPHONE (OUTPATIENT)
Dept: INFECTIOUS DISEASES | Age: 43
End: 2018-11-01

## 2018-11-01 DIAGNOSIS — M86.672 OTHER CHRONIC OSTEOMYELITIS OF LEFT FOOT (HCC): Primary | ICD-10-CM

## 2018-11-02 RX ORDER — AMOXICILLIN AND CLAVULANATE POTASSIUM 875; 125 MG/1; MG/1
1 TABLET, FILM COATED ORAL 2 TIMES DAILY
Qty: 84 TABLET | Refills: 0 | Status: SHIPPED | OUTPATIENT
Start: 2018-11-02 | End: 2018-12-05 | Stop reason: ALTCHOICE

## 2018-11-02 NOTE — TELEPHONE ENCOUNTER
They could pull out the PICC line. Have them draw a CRP and ESR. Set him up to see him in the office.

## 2018-11-05 ENCOUNTER — HOSPITAL ENCOUNTER (OUTPATIENT)
Age: 43
Setting detail: SPECIMEN
Discharge: HOME | End: 2018-11-05
Payer: COMMERCIAL

## 2018-11-05 LAB
ANION GAP SERPL CALCULATED.3IONS-SCNC: 11 MMOL/L (ref 4–16)
BASOPHILS ABSOLUTE: 0.1 K/CU MM
BASOPHILS RELATIVE PERCENT: 0.9 % (ref 0–1)
BUN BLDV-MCNC: 27 MG/DL (ref 6–23)
CALCIUM SERPL-MCNC: 8.6 MG/DL (ref 8.3–10.6)
CHLORIDE BLD-SCNC: 97 MMOL/L (ref 99–110)
CO2: 26 MMOL/L (ref 21–32)
CREAT SERPL-MCNC: 1.1 MG/DL (ref 0.9–1.3)
DIFFERENTIAL TYPE: ABNORMAL
EOSINOPHILS ABSOLUTE: 0.3 K/CU MM
EOSINOPHILS RELATIVE PERCENT: 3.8 % (ref 0–3)
GFR AFRICAN AMERICAN: >60 ML/MIN/1.73M2
GFR NON-AFRICAN AMERICAN: >60 ML/MIN/1.73M2
GLUCOSE BLD-MCNC: 394 MG/DL (ref 70–99)
HCT VFR BLD CALC: 37.6 % (ref 42–52)
HEMOGLOBIN: 12.6 GM/DL (ref 13.5–18)
IMMATURE NEUTROPHIL %: 0.3 % (ref 0–0.43)
LYMPHOCYTES ABSOLUTE: 1.8 K/CU MM
LYMPHOCYTES RELATIVE PERCENT: 26.7 % (ref 24–44)
MCH RBC QN AUTO: 30.1 PG (ref 27–31)
MCHC RBC AUTO-ENTMCNC: 33.5 % (ref 32–36)
MCV RBC AUTO: 89.7 FL (ref 78–100)
MONOCYTES ABSOLUTE: 0.7 K/CU MM
MONOCYTES RELATIVE PERCENT: 11.3 % (ref 0–4)
NUCLEATED RBC %: 0 %
PDW BLD-RTO: 13.2 % (ref 11.7–14.9)
PLATELET # BLD: 213 K/CU MM (ref 140–440)
PMV BLD AUTO: 11 FL (ref 7.5–11.1)
POTASSIUM SERPL-SCNC: 4.6 MMOL/L (ref 3.5–5.1)
RBC # BLD: 4.19 M/CU MM (ref 4.6–6.2)
SEGMENTED NEUTROPHILS ABSOLUTE COUNT: 3.7 K/CU MM
SEGMENTED NEUTROPHILS RELATIVE PERCENT: 57 % (ref 36–66)
SODIUM BLD-SCNC: 134 MMOL/L (ref 135–145)
TOTAL IMMATURE NEUTOROPHIL: 0.02 K/CU MM
TOTAL NUCLEATED RBC: 0 K/CU MM
WBC # BLD: 6.6 K/CU MM (ref 4–10.5)

## 2018-11-05 PROCEDURE — 80048 BASIC METABOLIC PNL TOTAL CA: CPT

## 2018-11-05 PROCEDURE — 85025 COMPLETE CBC W/AUTO DIFF WBC: CPT

## 2018-11-05 NOTE — TELEPHONE ENCOUNTER
Kelsie called and was told the information about the PICC line removal. They are going to tell the home health care about medication.

## 2018-11-06 ENCOUNTER — TELEPHONE (OUTPATIENT)
Dept: CARDIOLOGY CLINIC | Age: 43
End: 2018-11-06

## 2018-11-15 ENCOUNTER — TELEPHONE (OUTPATIENT)
Dept: CARDIOLOGY CLINIC | Age: 43
End: 2018-11-15

## 2018-12-05 ENCOUNTER — APPOINTMENT (OUTPATIENT)
Dept: GENERAL RADIOLOGY | Age: 43
End: 2018-12-05
Payer: COMMERCIAL

## 2018-12-05 ENCOUNTER — HOSPITAL ENCOUNTER (EMERGENCY)
Age: 43
Discharge: HOME | End: 2018-12-05
Attending: EMERGENCY MEDICINE
Payer: COMMERCIAL

## 2018-12-05 VITALS
HEART RATE: 88 BPM | SYSTOLIC BLOOD PRESSURE: 150 MMHG | BODY MASS INDEX: 38.51 KG/M2 | OXYGEN SATURATION: 98 % | RESPIRATION RATE: 17 BRPM | HEIGHT: 69 IN | WEIGHT: 260 LBS | DIASTOLIC BLOOD PRESSURE: 94 MMHG | TEMPERATURE: 98.2 F

## 2018-12-05 DIAGNOSIS — L03.316 CELLULITIS OF UMBILICUS: Primary | ICD-10-CM

## 2018-12-05 DIAGNOSIS — M79.672 LEFT FOOT PAIN: ICD-10-CM

## 2018-12-05 DIAGNOSIS — M79.89 SWELLING OF LEFT FOOT: ICD-10-CM

## 2018-12-05 LAB
ALBUMIN SERPL-MCNC: 3.4 GM/DL (ref 3.4–5)
ALP BLD-CCNC: 81 IU/L (ref 40–129)
ALT SERPL-CCNC: 12 U/L (ref 10–40)
ANION GAP SERPL CALCULATED.3IONS-SCNC: 12 MMOL/L (ref 4–16)
AST SERPL-CCNC: 12 IU/L (ref 15–37)
BASOPHILS ABSOLUTE: 0 K/CU MM
BASOPHILS RELATIVE PERCENT: 0.7 % (ref 0–1)
BILIRUB SERPL-MCNC: 0.2 MG/DL (ref 0–1)
BUN BLDV-MCNC: 32 MG/DL (ref 6–23)
CALCIUM SERPL-MCNC: 9.2 MG/DL (ref 8.3–10.6)
CHLORIDE BLD-SCNC: 97 MMOL/L (ref 99–110)
CO2: 23 MMOL/L (ref 21–32)
CREAT SERPL-MCNC: 1 MG/DL (ref 0.9–1.3)
DIFFERENTIAL TYPE: ABNORMAL
EOSINOPHILS ABSOLUTE: 0.2 K/CU MM
EOSINOPHILS RELATIVE PERCENT: 3.9 % (ref 0–3)
GFR AFRICAN AMERICAN: >60 ML/MIN/1.73M2
GFR NON-AFRICAN AMERICAN: >60 ML/MIN/1.73M2
GLUCOSE BLD-MCNC: 346 MG/DL (ref 70–99)
HCT VFR BLD CALC: 44.1 % (ref 42–52)
HEMOGLOBIN: 14.8 GM/DL (ref 13.5–18)
IMMATURE NEUTROPHIL %: 0.5 % (ref 0–0.43)
LIPASE: 55 IU/L (ref 13–60)
LYMPHOCYTES ABSOLUTE: 1.3 K/CU MM
LYMPHOCYTES RELATIVE PERCENT: 21.6 % (ref 24–44)
MCH RBC QN AUTO: 30.5 PG (ref 27–31)
MCHC RBC AUTO-ENTMCNC: 33.6 % (ref 32–36)
MCV RBC AUTO: 90.9 FL (ref 78–100)
MONOCYTES ABSOLUTE: 0.6 K/CU MM
MONOCYTES RELATIVE PERCENT: 10.1 % (ref 0–4)
NUCLEATED RBC %: 0 %
PDW BLD-RTO: 12.8 % (ref 11.7–14.9)
PLATELET # BLD: 206 K/CU MM (ref 140–440)
PMV BLD AUTO: 11 FL (ref 7.5–11.1)
POTASSIUM SERPL-SCNC: 4.6 MMOL/L (ref 3.5–5.1)
RBC # BLD: 4.85 M/CU MM (ref 4.6–6.2)
SEGMENTED NEUTROPHILS ABSOLUTE COUNT: 3.9 K/CU MM
SEGMENTED NEUTROPHILS RELATIVE PERCENT: 63.2 % (ref 36–66)
SODIUM BLD-SCNC: 132 MMOL/L (ref 135–145)
TOTAL IMMATURE NEUTOROPHIL: 0.03 K/CU MM
TOTAL NUCLEATED RBC: 0 K/CU MM
TOTAL PROTEIN: 7.5 GM/DL (ref 6.4–8.2)
WBC # BLD: 6.1 K/CU MM (ref 4–10.5)

## 2018-12-05 PROCEDURE — 85025 COMPLETE CBC W/AUTO DIFF WBC: CPT

## 2018-12-05 PROCEDURE — 80053 COMPREHEN METABOLIC PANEL: CPT

## 2018-12-05 PROCEDURE — 6370000000 HC RX 637 (ALT 250 FOR IP): Performed by: PHYSICIAN ASSISTANT

## 2018-12-05 PROCEDURE — 99283 EMERGENCY DEPT VISIT LOW MDM: CPT

## 2018-12-05 PROCEDURE — 83690 ASSAY OF LIPASE: CPT

## 2018-12-05 PROCEDURE — 73630 X-RAY EXAM OF FOOT: CPT

## 2018-12-05 PROCEDURE — 36415 COLL VENOUS BLD VENIPUNCTURE: CPT

## 2018-12-05 RX ORDER — SULFAMETHOXAZOLE AND TRIMETHOPRIM 800; 160 MG/1; MG/1
TABLET ORAL
Qty: 40 TABLET | Refills: 0 | Status: SHIPPED | OUTPATIENT
Start: 2018-12-05 | End: 2019-01-07

## 2018-12-05 RX ORDER — OXYCODONE HYDROCHLORIDE AND ACETAMINOPHEN 5; 325 MG/1; MG/1
1 TABLET ORAL ONCE
Status: COMPLETED | OUTPATIENT
Start: 2018-12-05 | End: 2018-12-05

## 2018-12-05 RX ORDER — CEPHALEXIN 500 MG/1
500 CAPSULE ORAL 4 TIMES DAILY
Qty: 40 CAPSULE | Refills: 0 | Status: SHIPPED | OUTPATIENT
Start: 2018-12-05 | End: 2018-12-15

## 2018-12-05 RX ADMIN — OXYCODONE AND ACETAMINOPHEN 1 TABLET: 5; 325 TABLET ORAL at 19:12

## 2018-12-05 ASSESSMENT — PAIN DESCRIPTION - PAIN TYPE: TYPE: ACUTE PAIN

## 2018-12-05 ASSESSMENT — PAIN SCALES - GENERAL
PAINLEVEL_OUTOF10: 8
PAINLEVEL_OUTOF10: 7

## 2018-12-05 NOTE — ED PROVIDER NOTES
soda daily, 2 cups tea daily/alchohol- average one drink per month    Drug use: Yes     Types: Marijuana      Comment: occasionally marijuana-last used 2 weeks ago- average 1-2 times per month    Sexual activity: Yes     Other Topics Concern    None     Social History Narrative    None     Family History   Problem Relation Age of Onset    Cancer Mother         ?site   Jose Orantes Stroke Mother     Bleeding Prob Mother     Diabetes Father     Heart Disease Father     High Blood Pressure Father     Obesity Father     Kidney Disease Father     Diabetes Sister     High Blood Pressure Sister     Mental Illness Sister     Obesity Sister     High Blood Pressure Other     Mental Illness Other         bipolar    Other Son         cyst in ear canal    ADHD Daughter          PHYSICAL EXAM    VITAL SIGNS: BP (!) 150/94   Pulse 88   Temp 98.2 °F (36.8 °C) (Oral)   Resp 17   Ht 5' 9\" (1.753 m)   Wt 260 lb (117.9 kg)   SpO2 98%   BMI 38.40 kg/m²    Constitutional:  Well developed, Well nourished  HENT:  Normocephalic, Atraumatic, PERRL. EOMI. Sclera clear. Conjunctiva normal, No discharge. Neck/Lymphatics: supple, no JVD, no swollen nodes  Cardiovascular:  Rate regular, regular Rhythm, no murmurs/rubs/gallops. No carotid bruits or murmurs heard in carotids. No JVD  Pedal pulse 2+ intact in left lower extremity. Capillary refill brisk in left lower extremity. Respiratory:  Nonlabored breathing. Normal breath sounds, No wheezing  Abdomen: Bowel sounds normal, Soft, + mild RUQ and superior aspect of umbilicus tenderness to palpation-no abdominal tenderness on exam, no masses. Negative Carl sign. Negative Rovsing sign. Negative tenderness over McBurney's point. There are cellulitic changes of the secure aspect of umbilicus-see integumentary  Musculoskeletal:    Right foot in wound boot. Left foot in wound shoe with wound vacuum attached to distal lateral left foot. No acute deformity.  There is mild changes or acute osseous abnormality. Left mild erythema but no overt signs of infection. No induration or fluctuance present. Abdomen exam significant for umbilical cellulitis. Low clinical suspicion for DVT. I consulted with patient's podiatrist, Dr. aJda Yoo, we discussed the patient's case. He is agrees with plan of beginning patient on PO cephalexin and Bactrim DS and outpatient follow-up. Patient treated in the ED with Percocet which he takes at home for his chronic pain with improvement in his pain. Patient is nontoxic appearing. Vital signs stable. Patient is stable for outpatient management at this time. Patient discharged home with prescriptions for cephalexin and Bactrim DS-we discussed these prescriptions. Patient comfortable discharge at this time. All pertinent Lab data and radiographic results reviewed with patient at bedside. The patient and/or the family were informed of the results of any tests/labs/imaging, the treatment plan, and time was allotted to answer questions. Diagnosis, disposition, and treatment plan reviewed in detail with patient. Patient understands and agrees to follow-up with PCP for recheck in 1-2 days and with podiatrist for recheck as soon as possible. Patient understands and agrees to return to emergency department for any new or worsening symptoms - strict return precautions given. Clinical  IMPRESSION    1. Cellulitis of umbilicus    2. Left foot pain    3. Swelling of left foot            Comment: Please note this report has been produced using speech recognition software and may contain errors related to that system including errors in grammar, punctuation, and spelling, as well as words and phrases that may be inappropriate. If there are any questions or concerns please feel free to contact the dictating provider for clarification.          Ju Russell PA-C  12/06/18 0025

## 2018-12-05 NOTE — ED NOTES
[place], lactic acid and first set of blood cultures drawn on patient. Second set needed if ordered.       Chantelle Kingsley  12/05/18 154

## 2018-12-05 NOTE — ED NOTES
Reports bilat leg pain and reports [facility] nurse was out for dressing change on left foot (wound vac) and stated wound had an oder.      Savanna Lundberg RN  12/05/18 [id number]

## 2018-12-06 NOTE — ED NOTES
1900 paged Dr Irene Hamm  12/05/18 1901 1903 Dr Jeffry Dancer returned call     Irene Hamm  12/05/18 1904

## 2019-01-06 ENCOUNTER — HOSPITAL ENCOUNTER (EMERGENCY)
Age: 44
Discharge: HOME | End: 2019-01-07
Payer: COMMERCIAL

## 2019-01-06 DIAGNOSIS — L97.529 DIABETIC ULCER OF LEFT FOOT ASSOCIATED WITH TYPE 2 DIABETES MELLITUS, UNSPECIFIED PART OF FOOT, UNSPECIFIED ULCER STAGE (HCC): ICD-10-CM

## 2019-01-06 DIAGNOSIS — M48.061 DEGENERATIVE LUMBAR SPINAL STENOSIS: ICD-10-CM

## 2019-01-06 DIAGNOSIS — E11.621 DIABETIC ULCER OF LEFT FOOT ASSOCIATED WITH TYPE 2 DIABETES MELLITUS, UNSPECIFIED PART OF FOOT, UNSPECIFIED ULCER STAGE (HCC): ICD-10-CM

## 2019-01-06 DIAGNOSIS — M54.50 RIGHT-SIDED LOW BACK PAIN WITHOUT SCIATICA, UNSPECIFIED CHRONICITY: Primary | ICD-10-CM

## 2019-01-06 LAB
BASOPHILS ABSOLUTE: 0.1 K/CU MM
BASOPHILS RELATIVE PERCENT: 0.6 % (ref 0–1)
DIFFERENTIAL TYPE: ABNORMAL
EOSINOPHILS ABSOLUTE: 0.3 K/CU MM
EOSINOPHILS RELATIVE PERCENT: 2.5 % (ref 0–3)
HCT VFR BLD CALC: 49.2 % (ref 42–52)
HEMOGLOBIN: 16.2 GM/DL (ref 13.5–18)
IMMATURE NEUTROPHIL %: 0.4 % (ref 0–0.43)
LYMPHOCYTES ABSOLUTE: 2.2 K/CU MM
LYMPHOCYTES RELATIVE PERCENT: 21.9 % (ref 24–44)
MCH RBC QN AUTO: 30.3 PG (ref 27–31)
MCHC RBC AUTO-ENTMCNC: 32.9 % (ref 32–36)
MCV RBC AUTO: 92 FL (ref 78–100)
MONOCYTES ABSOLUTE: 0.7 K/CU MM
MONOCYTES RELATIVE PERCENT: 6.5 % (ref 0–4)
NUCLEATED RBC %: 0 %
PDW BLD-RTO: 12 % (ref 11.7–14.9)
PLATELET # BLD: 273 K/CU MM (ref 140–440)
PMV BLD AUTO: 10.5 FL (ref 7.5–11.1)
RBC # BLD: 5.35 M/CU MM (ref 4.6–6.2)
SEGMENTED NEUTROPHILS ABSOLUTE COUNT: 6.8 K/CU MM
SEGMENTED NEUTROPHILS RELATIVE PERCENT: 68.1 % (ref 36–66)
TOTAL IMMATURE NEUTOROPHIL: 0.04 K/CU MM
TOTAL NUCLEATED RBC: 0 K/CU MM
WBC # BLD: 10 K/CU MM (ref 4–10.5)

## 2019-01-06 PROCEDURE — 99284 EMERGENCY DEPT VISIT MOD MDM: CPT

## 2019-01-06 PROCEDURE — 85025 COMPLETE CBC W/AUTO DIFF WBC: CPT

## 2019-01-06 PROCEDURE — 85652 RBC SED RATE AUTOMATED: CPT

## 2019-01-06 PROCEDURE — 80053 COMPREHEN METABOLIC PANEL: CPT

## 2019-01-06 PROCEDURE — 36415 COLL VENOUS BLD VENIPUNCTURE: CPT

## 2019-01-06 ASSESSMENT — PAIN DESCRIPTION - PAIN TYPE: TYPE: ACUTE PAIN

## 2019-01-06 ASSESSMENT — PAIN SCALES - GENERAL: PAINLEVEL_OUTOF10: 9

## 2019-01-06 ASSESSMENT — PAIN DESCRIPTION - LOCATION: LOCATION: BACK;FOOT

## 2019-01-07 ENCOUNTER — APPOINTMENT (OUTPATIENT)
Dept: GENERAL RADIOLOGY | Age: 44
End: 2019-01-07
Payer: COMMERCIAL

## 2019-01-07 ENCOUNTER — APPOINTMENT (OUTPATIENT)
Dept: MRI IMAGING | Age: 44
End: 2019-01-07
Payer: COMMERCIAL

## 2019-01-07 VITALS
RESPIRATION RATE: 16 BRPM | SYSTOLIC BLOOD PRESSURE: 117 MMHG | HEIGHT: 69 IN | HEART RATE: 76 BPM | WEIGHT: 265 LBS | TEMPERATURE: 97.6 F | OXYGEN SATURATION: 96 % | BODY MASS INDEX: 39.25 KG/M2 | DIASTOLIC BLOOD PRESSURE: 83 MMHG

## 2019-01-07 LAB
ALBUMIN SERPL-MCNC: 3.4 GM/DL (ref 3.4–5)
ALP BLD-CCNC: 61 IU/L (ref 40–129)
ALT SERPL-CCNC: 17 U/L (ref 10–40)
ANION GAP SERPL CALCULATED.3IONS-SCNC: 14 MMOL/L (ref 4–16)
AST SERPL-CCNC: 15 IU/L (ref 15–37)
BACTERIA: ABNORMAL /HPF
BILIRUB SERPL-MCNC: 0.4 MG/DL (ref 0–1)
BILIRUBIN URINE: NEGATIVE MG/DL
BLOOD, URINE: NEGATIVE
BUN BLDV-MCNC: 22 MG/DL (ref 6–23)
CALCIUM SERPL-MCNC: 9.3 MG/DL (ref 8.3–10.6)
CHLORIDE BLD-SCNC: 98 MMOL/L (ref 99–110)
CLARITY: ABNORMAL
CO2: 21 MMOL/L (ref 21–32)
COLOR: YELLOW
CREAT SERPL-MCNC: 1.1 MG/DL (ref 0.9–1.3)
ERYTHROCYTE SEDIMENTATION RATE: 40 MM/HR (ref 0–15)
GFR AFRICAN AMERICAN: >60 ML/MIN/1.73M2
GFR NON-AFRICAN AMERICAN: >60 ML/MIN/1.73M2
GLUCOSE BLD-MCNC: 235 MG/DL (ref 70–99)
GLUCOSE, URINE: 150 MG/DL
HYALINE CASTS: >20 /LPF
KETONES, URINE: NEGATIVE MG/DL
LEUKOCYTE ESTERASE, URINE: NEGATIVE
MUCUS: ABNORMAL HPF
NITRITE URINE, QUANTITATIVE: NEGATIVE
PH, URINE: 5 (ref 5–8)
POTASSIUM SERPL-SCNC: 5 MMOL/L (ref 3.5–5.1)
PROTEIN UA: >500 MG/DL
RBC URINE: ABNORMAL /HPF (ref 0–3)
SODIUM BLD-SCNC: 133 MMOL/L (ref 135–145)
SPECIFIC GRAVITY UA: 1.02 (ref 1–1.03)
SQUAMOUS EPITHELIAL: 1 /HPF
TOTAL PROTEIN: 7.5 GM/DL (ref 6.4–8.2)
TRICHOMONAS: ABNORMAL /HPF
UROBILINOGEN, URINE: 1 MG/DL (ref 0.2–1)
WBC UA: 2 /HPF (ref 0–2)

## 2019-01-07 PROCEDURE — A9579 GAD-BASE MR CONTRAST NOS,1ML: HCPCS | Performed by: PHYSICIAN ASSISTANT

## 2019-01-07 PROCEDURE — 87073 CULTURE BACTERIA ANAEROBIC: CPT

## 2019-01-07 PROCEDURE — 96376 TX/PRO/DX INJ SAME DRUG ADON: CPT

## 2019-01-07 PROCEDURE — 96374 THER/PROPH/DIAG INJ IV PUSH: CPT

## 2019-01-07 PROCEDURE — 96375 TX/PRO/DX INJ NEW DRUG ADDON: CPT

## 2019-01-07 PROCEDURE — 74018 RADEX ABDOMEN 1 VIEW: CPT

## 2019-01-07 PROCEDURE — 81001 URINALYSIS AUTO W/SCOPE: CPT

## 2019-01-07 PROCEDURE — 6360000002 HC RX W HCPCS: Performed by: PHYSICIAN ASSISTANT

## 2019-01-07 PROCEDURE — 87071 CULTURE AEROBIC QUANT OTHER: CPT

## 2019-01-07 PROCEDURE — 73630 X-RAY EXAM OF FOOT: CPT

## 2019-01-07 PROCEDURE — 6360000004 HC RX CONTRAST MEDICATION: Performed by: PHYSICIAN ASSISTANT

## 2019-01-07 PROCEDURE — 6370000000 HC RX 637 (ALT 250 FOR IP): Performed by: PHYSICIAN ASSISTANT

## 2019-01-07 PROCEDURE — 72158 MRI LUMBAR SPINE W/O & W/DYE: CPT

## 2019-01-07 PROCEDURE — 87186 SC STD MICRODIL/AGAR DIL: CPT

## 2019-01-07 PROCEDURE — 87077 CULTURE AEROBIC IDENTIFY: CPT

## 2019-01-07 RX ORDER — SULFAMETHOXAZOLE AND TRIMETHOPRIM 800; 160 MG/1; MG/1
1 TABLET ORAL 2 TIMES DAILY
Qty: 20 TABLET | Refills: 0 | Status: ON HOLD | OUTPATIENT
Start: 2019-01-07 | End: 2019-01-12 | Stop reason: HOSPADM

## 2019-01-07 RX ORDER — CEPHALEXIN 500 MG/1
500 CAPSULE ORAL 3 TIMES DAILY
Qty: 21 CAPSULE | Refills: 0 | Status: ON HOLD | OUTPATIENT
Start: 2019-01-07 | End: 2019-01-12 | Stop reason: HOSPADM

## 2019-01-07 RX ORDER — ORPHENADRINE CITRATE 30 MG/ML
60 INJECTION INTRAMUSCULAR; INTRAVENOUS ONCE
Status: COMPLETED | OUTPATIENT
Start: 2019-01-07 | End: 2019-01-07

## 2019-01-07 RX ORDER — MORPHINE SULFATE 4 MG/ML
4 INJECTION, SOLUTION INTRAMUSCULAR; INTRAVENOUS ONCE
Status: COMPLETED | OUTPATIENT
Start: 2019-01-07 | End: 2019-01-07

## 2019-01-07 RX ORDER — DIAZEPAM 5 MG/1
5 TABLET ORAL EVERY 6 HOURS PRN
Qty: 10 TABLET | Refills: 0 | Status: SHIPPED | OUTPATIENT
Start: 2019-01-07 | End: 2019-01-17

## 2019-01-07 RX ORDER — DIAZEPAM 5 MG/1
5 TABLET ORAL ONCE
Status: COMPLETED | OUTPATIENT
Start: 2019-01-07 | End: 2019-01-07

## 2019-01-07 RX ADMIN — GADOTERIDOL 20 ML: 279.3 INJECTION, SOLUTION INTRAVENOUS at 03:51

## 2019-01-07 RX ADMIN — DIAZEPAM 5 MG: 5 TABLET ORAL at 03:23

## 2019-01-07 RX ADMIN — MORPHINE SULFATE 4 MG: 4 INJECTION INTRAVENOUS at 03:23

## 2019-01-07 RX ADMIN — MORPHINE SULFATE 4 MG: 4 INJECTION INTRAVENOUS at 01:11

## 2019-01-07 RX ADMIN — ORPHENADRINE CITRATE 60 MG: 30 INJECTION INTRAMUSCULAR; INTRAVENOUS at 01:11

## 2019-01-07 ASSESSMENT — PAIN DESCRIPTION - LOCATION
LOCATION: BACK
LOCATION: BACK

## 2019-01-07 ASSESSMENT — PAIN SCALES - GENERAL
PAINLEVEL_OUTOF10: 7
PAINLEVEL_OUTOF10: 9
PAINLEVEL_OUTOF10: 7
PAINLEVEL_OUTOF10: 8

## 2019-01-07 ASSESSMENT — PAIN DESCRIPTION - PAIN TYPE
TYPE: ACUTE PAIN
TYPE: ACUTE PAIN

## 2019-01-07 ASSESSMENT — PAIN DESCRIPTION - ORIENTATION
ORIENTATION: RIGHT;LOWER
ORIENTATION: RIGHT;LOWER

## 2019-01-09 ENCOUNTER — HOSPITAL ENCOUNTER (EMERGENCY)
Age: 44
Discharge: HOME | End: 2019-01-09
Attending: EMERGENCY MEDICINE
Payer: COMMERCIAL

## 2019-01-09 ENCOUNTER — APPOINTMENT (OUTPATIENT)
Dept: CT IMAGING | Age: 44
End: 2019-01-09
Payer: COMMERCIAL

## 2019-01-09 VITALS
TEMPERATURE: 98 F | BODY MASS INDEX: 39.25 KG/M2 | OXYGEN SATURATION: 100 % | HEART RATE: 89 BPM | WEIGHT: 265 LBS | RESPIRATION RATE: 16 BRPM | SYSTOLIC BLOOD PRESSURE: 161 MMHG | DIASTOLIC BLOOD PRESSURE: 105 MMHG | HEIGHT: 69 IN

## 2019-01-09 DIAGNOSIS — G89.29 CHRONIC BILATERAL LOW BACK PAIN WITHOUT SCIATICA: ICD-10-CM

## 2019-01-09 DIAGNOSIS — R11.2 NON-INTRACTABLE VOMITING WITH NAUSEA, UNSPECIFIED VOMITING TYPE: Primary | ICD-10-CM

## 2019-01-09 DIAGNOSIS — M54.50 CHRONIC BILATERAL LOW BACK PAIN WITHOUT SCIATICA: ICD-10-CM

## 2019-01-09 LAB
ALBUMIN SERPL-MCNC: 3.5 GM/DL (ref 3.4–5)
ALP BLD-CCNC: 63 IU/L (ref 40–129)
ALT SERPL-CCNC: 18 U/L (ref 10–40)
ANION GAP SERPL CALCULATED.3IONS-SCNC: 13 MMOL/L (ref 4–16)
AST SERPL-CCNC: 15 IU/L (ref 15–37)
BACTERIA: NEGATIVE /HPF
BASOPHILS ABSOLUTE: 0.1 K/CU MM
BASOPHILS RELATIVE PERCENT: 0.6 % (ref 0–1)
BILIRUB SERPL-MCNC: 0.5 MG/DL (ref 0–1)
BILIRUBIN URINE: NEGATIVE MG/DL
BLOOD, URINE: ABNORMAL
BUN BLDV-MCNC: 21 MG/DL (ref 6–23)
CALCIUM SERPL-MCNC: 9.6 MG/DL (ref 8.3–10.6)
CHLORIDE BLD-SCNC: 101 MMOL/L (ref 99–110)
CLARITY: CLEAR
CO2: 21 MMOL/L (ref 21–32)
COLOR: ABNORMAL
CREAT SERPL-MCNC: 0.9 MG/DL (ref 0.9–1.3)
DIFFERENTIAL TYPE: ABNORMAL
EOSINOPHILS ABSOLUTE: 0.2 K/CU MM
EOSINOPHILS RELATIVE PERCENT: 1.3 % (ref 0–3)
GFR AFRICAN AMERICAN: >60 ML/MIN/1.73M2
GFR NON-AFRICAN AMERICAN: >60 ML/MIN/1.73M2
GLUCOSE BLD-MCNC: 240 MG/DL (ref 70–99)
GLUCOSE, URINE: >500 MG/DL
HCT VFR BLD CALC: 48 % (ref 42–52)
HEMOGLOBIN: 16.6 GM/DL (ref 13.5–18)
IMMATURE NEUTROPHIL %: 0.5 % (ref 0–0.43)
KETONES, URINE: NEGATIVE MG/DL
LEUKOCYTE ESTERASE, URINE: NEGATIVE
LIPASE: 51 IU/L (ref 13–60)
LYMPHOCYTES ABSOLUTE: 2.1 K/CU MM
LYMPHOCYTES RELATIVE PERCENT: 18.8 % (ref 24–44)
MCH RBC QN AUTO: 30.5 PG (ref 27–31)
MCHC RBC AUTO-ENTMCNC: 34.6 % (ref 32–36)
MCV RBC AUTO: 88.2 FL (ref 78–100)
MONOCYTES ABSOLUTE: 0.9 K/CU MM
MONOCYTES RELATIVE PERCENT: 8 % (ref 0–4)
MUCUS: ABNORMAL HPF
NITRITE URINE, QUANTITATIVE: NEGATIVE
NUCLEATED RBC %: 0 %
PDW BLD-RTO: 11.9 % (ref 11.7–14.9)
PH, URINE: 7 (ref 5–8)
PLATELET # BLD: 261 K/CU MM (ref 140–440)
PMV BLD AUTO: 10.4 FL (ref 7.5–11.1)
POTASSIUM SERPL-SCNC: 4.7 MMOL/L (ref 3.5–5.1)
PROTEIN UA: >500 MG/DL
RBC # BLD: 5.44 M/CU MM (ref 4.6–6.2)
RBC URINE: 3 /HPF (ref 0–3)
SEGMENTED NEUTROPHILS ABSOLUTE COUNT: 8 K/CU MM
SEGMENTED NEUTROPHILS RELATIVE PERCENT: 70.8 % (ref 36–66)
SODIUM BLD-SCNC: 135 MMOL/L (ref 135–145)
SPECIFIC GRAVITY UA: 1.02 (ref 1–1.03)
TOTAL IMMATURE NEUTOROPHIL: 0.06 K/CU MM
TOTAL NUCLEATED RBC: 0 K/CU MM
TOTAL PROTEIN: 7.7 GM/DL (ref 6.4–8.2)
TRICHOMONAS: ABNORMAL /HPF
UROBILINOGEN, URINE: NORMAL MG/DL (ref 0.2–1)
WBC # BLD: 11.3 K/CU MM (ref 4–10.5)
WBC UA: 1 /HPF (ref 0–2)

## 2019-01-09 PROCEDURE — 6360000002 HC RX W HCPCS: Performed by: EMERGENCY MEDICINE

## 2019-01-09 PROCEDURE — 2580000003 HC RX 258: Performed by: EMERGENCY MEDICINE

## 2019-01-09 PROCEDURE — 96375 TX/PRO/DX INJ NEW DRUG ADDON: CPT

## 2019-01-09 PROCEDURE — 99284 EMERGENCY DEPT VISIT MOD MDM: CPT

## 2019-01-09 PROCEDURE — 6360000004 HC RX CONTRAST MEDICATION: Performed by: EMERGENCY MEDICINE

## 2019-01-09 PROCEDURE — 36415 COLL VENOUS BLD VENIPUNCTURE: CPT

## 2019-01-09 PROCEDURE — 6370000000 HC RX 637 (ALT 250 FOR IP): Performed by: EMERGENCY MEDICINE

## 2019-01-09 PROCEDURE — 96374 THER/PROPH/DIAG INJ IV PUSH: CPT

## 2019-01-09 PROCEDURE — 80053 COMPREHEN METABOLIC PANEL: CPT

## 2019-01-09 PROCEDURE — 85025 COMPLETE CBC W/AUTO DIFF WBC: CPT

## 2019-01-09 PROCEDURE — 96376 TX/PRO/DX INJ SAME DRUG ADON: CPT

## 2019-01-09 PROCEDURE — 96361 HYDRATE IV INFUSION ADD-ON: CPT

## 2019-01-09 PROCEDURE — 74177 CT ABD & PELVIS W/CONTRAST: CPT

## 2019-01-09 PROCEDURE — 81001 URINALYSIS AUTO W/SCOPE: CPT

## 2019-01-09 PROCEDURE — 83690 ASSAY OF LIPASE: CPT

## 2019-01-09 RX ORDER — ONDANSETRON 2 MG/ML
4 INJECTION INTRAMUSCULAR; INTRAVENOUS EVERY 30 MIN PRN
Status: DISCONTINUED | OUTPATIENT
Start: 2019-01-09 | End: 2019-01-09 | Stop reason: HOSPADM

## 2019-01-09 RX ORDER — OXYCODONE HYDROCHLORIDE AND ACETAMINOPHEN 5; 325 MG/1; MG/1
2 TABLET ORAL ONCE
Status: COMPLETED | OUTPATIENT
Start: 2019-01-09 | End: 2019-01-09

## 2019-01-09 RX ORDER — ONDANSETRON 4 MG/1
4 TABLET, ORALLY DISINTEGRATING ORAL 3 TIMES DAILY PRN
Qty: 21 TABLET | Refills: 0 | Status: SHIPPED | OUTPATIENT
Start: 2019-01-09 | End: 2019-01-10 | Stop reason: SDUPTHER

## 2019-01-09 RX ORDER — MORPHINE SULFATE 4 MG/ML
4 INJECTION, SOLUTION INTRAMUSCULAR; INTRAVENOUS EVERY 30 MIN PRN
Status: DISCONTINUED | OUTPATIENT
Start: 2019-01-09 | End: 2019-01-09 | Stop reason: HOSPADM

## 2019-01-09 RX ORDER — 0.9 % SODIUM CHLORIDE 0.9 %
10 VIAL (ML) INJECTION
Status: COMPLETED | OUTPATIENT
Start: 2019-01-09 | End: 2019-01-09

## 2019-01-09 RX ORDER — 0.9 % SODIUM CHLORIDE 0.9 %
1000 INTRAVENOUS SOLUTION INTRAVENOUS ONCE
Status: COMPLETED | OUTPATIENT
Start: 2019-01-09 | End: 2019-01-09

## 2019-01-09 RX ADMIN — MORPHINE SULFATE 4 MG: 4 INJECTION INTRAVENOUS at 16:58

## 2019-01-09 RX ADMIN — IOPAMIDOL 75 ML: 755 INJECTION, SOLUTION INTRAVENOUS at 16:01

## 2019-01-09 RX ADMIN — SODIUM CHLORIDE 10 ML: 9 INJECTION, SOLUTION INTRAMUSCULAR; INTRAVENOUS; SUBCUTANEOUS at 16:01

## 2019-01-09 RX ADMIN — OXYCODONE HYDROCHLORIDE AND ACETAMINOPHEN 2 TABLET: 5; 325 TABLET ORAL at 17:43

## 2019-01-09 RX ADMIN — ONDANSETRON 4 MG: 2 INJECTION INTRAMUSCULAR; INTRAVENOUS at 14:57

## 2019-01-09 RX ADMIN — SODIUM CHLORIDE 1000 ML: 9 INJECTION, SOLUTION INTRAVENOUS at 14:57

## 2019-01-09 RX ADMIN — MORPHINE SULFATE 4 MG: 4 INJECTION INTRAVENOUS at 14:57

## 2019-01-09 ASSESSMENT — PAIN SCALES - GENERAL
PAINLEVEL_OUTOF10: 7
PAINLEVEL_OUTOF10: 6
PAINLEVEL_OUTOF10: 9
PAINLEVEL_OUTOF10: 10
PAINLEVEL_OUTOF10: 8
PAINLEVEL_OUTOF10: 9

## 2019-01-09 ASSESSMENT — PAIN DESCRIPTION - PAIN TYPE: TYPE: ACUTE PAIN

## 2019-01-09 ASSESSMENT — PAIN DESCRIPTION - LOCATION
LOCATION: BACK
LOCATION: BACK

## 2019-01-09 ASSESSMENT — PAIN DESCRIPTION - ORIENTATION: ORIENTATION: LOWER;RIGHT

## 2019-01-09 ASSESSMENT — PAIN DESCRIPTION - DESCRIPTORS: DESCRIPTORS: SPASM

## 2019-01-10 ENCOUNTER — APPOINTMENT (OUTPATIENT)
Dept: ULTRASOUND IMAGING | Age: 44
End: 2019-01-10
Payer: COMMERCIAL

## 2019-01-10 ENCOUNTER — HOSPITAL ENCOUNTER (OUTPATIENT)
Age: 44
Setting detail: OBSERVATION
Discharge: HOME | End: 2019-01-12
Attending: EMERGENCY MEDICINE | Admitting: INTERNAL MEDICINE
Payer: COMMERCIAL

## 2019-01-10 ENCOUNTER — APPOINTMENT (OUTPATIENT)
Dept: CT IMAGING | Age: 44
End: 2019-01-10
Payer: COMMERCIAL

## 2019-01-10 DIAGNOSIS — R73.9 ACUTE HYPERGLYCEMIA: ICD-10-CM

## 2019-01-10 DIAGNOSIS — R11.2 NON-INTRACTABLE VOMITING WITH NAUSEA, UNSPECIFIED VOMITING TYPE: Primary | ICD-10-CM

## 2019-01-10 DIAGNOSIS — E88.89 KETOSIS (HCC): ICD-10-CM

## 2019-01-10 LAB
ALBUMIN SERPL-MCNC: 3.5 GM/DL (ref 3.4–5)
ALP BLD-CCNC: 66 IU/L (ref 40–129)
ALT SERPL-CCNC: 16 U/L (ref 10–40)
ANION GAP SERPL CALCULATED.3IONS-SCNC: 17 MMOL/L (ref 4–16)
AST SERPL-CCNC: 15 IU/L (ref 15–37)
BASOPHILS ABSOLUTE: 0 K/CU MM
BASOPHILS RELATIVE PERCENT: 0.2 % (ref 0–1)
BETA-HYDROXYBUTYRATE: >20.8 MG/DL (ref 0–3)
BILIRUB SERPL-MCNC: 0.9 MG/DL (ref 0–1)
BUN BLDV-MCNC: 17 MG/DL (ref 6–23)
CALCIUM SERPL-MCNC: 9.4 MG/DL (ref 8.3–10.6)
CHLORIDE BLD-SCNC: 99 MMOL/L (ref 99–110)
CO2: 17 MMOL/L (ref 21–32)
CREAT SERPL-MCNC: 1 MG/DL (ref 0.9–1.3)
CULTURE: NORMAL
DIFFERENTIAL TYPE: ABNORMAL
EOSINOPHILS ABSOLUTE: 0 K/CU MM
EOSINOPHILS RELATIVE PERCENT: 0.3 % (ref 0–3)
GFR AFRICAN AMERICAN: >60 ML/MIN/1.73M2
GFR NON-AFRICAN AMERICAN: >60 ML/MIN/1.73M2
GLUCOSE BLD-MCNC: 276 MG/DL (ref 70–99)
HCT VFR BLD CALC: 47.8 % (ref 42–52)
HEMOGLOBIN: 17.1 GM/DL (ref 13.5–18)
IMMATURE NEUTROPHIL %: 0.5 % (ref 0–0.43)
LYMPHOCYTES ABSOLUTE: 1.3 K/CU MM
LYMPHOCYTES RELATIVE PERCENT: 12.9 % (ref 24–44)
Lab: NORMAL
MCH RBC QN AUTO: 30.6 PG (ref 27–31)
MCHC RBC AUTO-ENTMCNC: 35.8 % (ref 32–36)
MCV RBC AUTO: 85.7 FL (ref 78–100)
MONOCYTES ABSOLUTE: 0.7 K/CU MM
MONOCYTES RELATIVE PERCENT: 7.3 % (ref 0–4)
NUCLEATED RBC %: 0 %
ORGANISM: NORMAL
PDW BLD-RTO: 11.8 % (ref 11.7–14.9)
PLATELET # BLD: 277 K/CU MM (ref 140–440)
PMV BLD AUTO: 10.8 FL (ref 7.5–11.1)
POTASSIUM SERPL-SCNC: 4.3 MMOL/L (ref 3.5–5.1)
RBC # BLD: 5.58 M/CU MM (ref 4.6–6.2)
REASON FOR REJECTION: NORMAL
REJECTED TEST: NORMAL
REPORT STATUS: NORMAL
SEGMENTED NEUTROPHILS ABSOLUTE COUNT: 7.9 K/CU MM
SEGMENTED NEUTROPHILS RELATIVE PERCENT: 78.8 % (ref 36–66)
SODIUM BLD-SCNC: 133 MMOL/L (ref 135–145)
SOURCE: NORMAL
SPECIMEN: NORMAL
TOTAL IMMATURE NEUTOROPHIL: 0.05 K/CU MM
TOTAL NUCLEATED RBC: 0 K/CU MM
TOTAL PROTEIN: 7.6 GM/DL (ref 6.4–8.2)
WBC # BLD: 10 K/CU MM (ref 4–10.5)

## 2019-01-10 PROCEDURE — 82010 KETONE BODYS QUAN: CPT

## 2019-01-10 PROCEDURE — 2580000003 HC RX 258: Performed by: PHYSICIAN ASSISTANT

## 2019-01-10 PROCEDURE — 96376 TX/PRO/DX INJ SAME DRUG ADON: CPT

## 2019-01-10 PROCEDURE — 85025 COMPLETE CBC W/AUTO DIFF WBC: CPT

## 2019-01-10 PROCEDURE — C9113 INJ PANTOPRAZOLE SODIUM, VIA: HCPCS | Performed by: PHYSICIAN ASSISTANT

## 2019-01-10 PROCEDURE — 82805 BLOOD GASES W/O2 SATURATION: CPT

## 2019-01-10 PROCEDURE — 96361 HYDRATE IV INFUSION ADD-ON: CPT

## 2019-01-10 PROCEDURE — 96372 THER/PROPH/DIAG INJ SC/IM: CPT

## 2019-01-10 PROCEDURE — 87086 URINE CULTURE/COLONY COUNT: CPT

## 2019-01-10 PROCEDURE — 99285 EMERGENCY DEPT VISIT HI MDM: CPT

## 2019-01-10 PROCEDURE — 80053 COMPREHEN METABOLIC PANEL: CPT

## 2019-01-10 PROCEDURE — 6360000002 HC RX W HCPCS: Performed by: EMERGENCY MEDICINE

## 2019-01-10 PROCEDURE — 96375 TX/PRO/DX INJ NEW DRUG ADDON: CPT

## 2019-01-10 PROCEDURE — 76705 ECHO EXAM OF ABDOMEN: CPT

## 2019-01-10 PROCEDURE — 74177 CT ABD & PELVIS W/CONTRAST: CPT

## 2019-01-10 PROCEDURE — 93005 ELECTROCARDIOGRAM TRACING: CPT | Performed by: EMERGENCY MEDICINE

## 2019-01-10 PROCEDURE — 81001 URINALYSIS AUTO W/SCOPE: CPT

## 2019-01-10 PROCEDURE — 6360000002 HC RX W HCPCS: Performed by: PHYSICIAN ASSISTANT

## 2019-01-10 PROCEDURE — 6360000004 HC RX CONTRAST MEDICATION

## 2019-01-10 PROCEDURE — 83735 ASSAY OF MAGNESIUM: CPT

## 2019-01-10 PROCEDURE — 36415 COLL VENOUS BLD VENIPUNCTURE: CPT

## 2019-01-10 PROCEDURE — 96374 THER/PROPH/DIAG INJ IV PUSH: CPT

## 2019-01-10 RX ORDER — LIDOCAINE 50 MG/G
1 PATCH TOPICAL DAILY
Status: ON HOLD | COMMUNITY
End: 2021-04-04 | Stop reason: ALTCHOICE

## 2019-01-10 RX ORDER — OXYCODONE AND ACETAMINOPHEN 7.5; 325 MG/1; MG/1
1 TABLET ORAL EVERY 6 HOURS PRN
Status: ON HOLD | COMMUNITY
End: 2021-03-29 | Stop reason: SDUPTHER

## 2019-01-10 RX ORDER — PANTOPRAZOLE SODIUM 40 MG/10ML
40 INJECTION, POWDER, LYOPHILIZED, FOR SOLUTION INTRAVENOUS ONCE
Status: COMPLETED | OUTPATIENT
Start: 2019-01-10 | End: 2019-01-10

## 2019-01-10 RX ORDER — MORPHINE SULFATE 4 MG/ML
4 INJECTION, SOLUTION INTRAMUSCULAR; INTRAVENOUS EVERY 30 MIN PRN
Status: DISCONTINUED | OUTPATIENT
Start: 2019-01-10 | End: 2019-01-11 | Stop reason: ALTCHOICE

## 2019-01-10 RX ORDER — NAPROXEN 500 MG/1
500 TABLET ORAL 2 TIMES DAILY PRN
Status: ON HOLD | COMMUNITY
End: 2019-08-08 | Stop reason: HOSPADM

## 2019-01-10 RX ORDER — PROMETHAZINE HYDROCHLORIDE 25 MG/ML
25 INJECTION, SOLUTION INTRAMUSCULAR; INTRAVENOUS ONCE
Status: COMPLETED | OUTPATIENT
Start: 2019-01-10 | End: 2019-01-10

## 2019-01-10 RX ORDER — HYDRALAZINE HYDROCHLORIDE 50 MG/1
50 TABLET, FILM COATED ORAL 2 TIMES DAILY
Status: ON HOLD | COMMUNITY
End: 2019-03-26 | Stop reason: HOSPADM

## 2019-01-10 RX ORDER — PANTOPRAZOLE SODIUM 40 MG/1
40 TABLET, DELAYED RELEASE ORAL PRN
Status: ON HOLD | COMMUNITY
End: 2019-01-12

## 2019-01-10 RX ORDER — PROMETHAZINE HYDROCHLORIDE 25 MG/1
25 TABLET ORAL ONCE
Status: COMPLETED | OUTPATIENT
Start: 2019-01-10 | End: 2019-01-10

## 2019-01-10 RX ORDER — LISINOPRIL 2.5 MG/1
2.5 TABLET ORAL DAILY
Status: ON HOLD | COMMUNITY
End: 2021-03-29 | Stop reason: HOSPADM

## 2019-01-10 RX ORDER — DIAZEPAM 5 MG/ML
5 INJECTION, SOLUTION INTRAMUSCULAR; INTRAVENOUS ONCE
Status: COMPLETED | OUTPATIENT
Start: 2019-01-10 | End: 2019-01-10

## 2019-01-10 RX ORDER — 0.9 % SODIUM CHLORIDE 0.9 %
1000 INTRAVENOUS SOLUTION INTRAVENOUS ONCE
Status: COMPLETED | OUTPATIENT
Start: 2019-01-10 | End: 2019-01-11

## 2019-01-10 RX ORDER — MORPHINE SULFATE 4 MG/ML
4 INJECTION, SOLUTION INTRAMUSCULAR; INTRAVENOUS ONCE
Status: COMPLETED | OUTPATIENT
Start: 2019-01-10 | End: 2019-01-10

## 2019-01-10 RX ORDER — 0.9 % SODIUM CHLORIDE 0.9 %
1000 INTRAVENOUS SOLUTION INTRAVENOUS ONCE
Status: COMPLETED | OUTPATIENT
Start: 2019-01-10 | End: 2019-01-10

## 2019-01-10 RX ADMIN — IOPAMIDOL 75 ML: 755 INJECTION, SOLUTION INTRAVENOUS at 22:11

## 2019-01-10 RX ADMIN — MORPHINE SULFATE 4 MG: 4 INJECTION INTRAVENOUS at 22:17

## 2019-01-10 RX ADMIN — SODIUM CHLORIDE 1000 ML: 9 INJECTION, SOLUTION INTRAVENOUS at 23:46

## 2019-01-10 RX ADMIN — PANTOPRAZOLE SODIUM 40 MG: 40 INJECTION, POWDER, FOR SOLUTION INTRAVENOUS at 19:38

## 2019-01-10 RX ADMIN — PROMETHAZINE HYDROCHLORIDE 25 MG: 25 INJECTION INTRAMUSCULAR; INTRAVENOUS at 19:38

## 2019-01-10 RX ADMIN — SODIUM CHLORIDE 1000 ML: 9 INJECTION, SOLUTION INTRAVENOUS at 19:39

## 2019-01-10 RX ADMIN — DIAZEPAM 5 MG: 5 INJECTION, SOLUTION INTRAMUSCULAR; INTRAVENOUS at 21:33

## 2019-01-10 RX ADMIN — PROMETHAZINE HYDROCHLORIDE 25 MG: 25 INJECTION INTRAMUSCULAR; INTRAVENOUS at 23:33

## 2019-01-10 RX ADMIN — MORPHINE SULFATE 4 MG: 4 INJECTION INTRAVENOUS at 19:38

## 2019-01-10 RX ADMIN — PROMETHAZINE HYDROCHLORIDE 25 MG: 25 TABLET ORAL at 21:46

## 2019-01-10 ASSESSMENT — PAIN DESCRIPTION - FREQUENCY: FREQUENCY: CONTINUOUS

## 2019-01-10 ASSESSMENT — PAIN SCALES - GENERAL
PAINLEVEL_OUTOF10: 10
PAINLEVEL_OUTOF10: 10
PAINLEVEL_OUTOF10: 8
PAINLEVEL_OUTOF10: 8

## 2019-01-10 ASSESSMENT — PAIN DESCRIPTION - DESCRIPTORS: DESCRIPTORS: CONSTANT;ACHING

## 2019-01-10 ASSESSMENT — PAIN DESCRIPTION - LOCATION: LOCATION: BACK

## 2019-01-10 ASSESSMENT — PAIN DESCRIPTION - PAIN TYPE: TYPE: ACUTE PAIN

## 2019-01-10 ASSESSMENT — PAIN DESCRIPTION - PROGRESSION: CLINICAL_PROGRESSION: NOT CHANGED

## 2019-01-10 ASSESSMENT — PAIN DESCRIPTION - ONSET: ONSET: ON-GOING

## 2019-01-11 ENCOUNTER — APPOINTMENT (OUTPATIENT)
Dept: ULTRASOUND IMAGING | Age: 44
End: 2019-01-11
Payer: COMMERCIAL

## 2019-01-11 ENCOUNTER — ANESTHESIA EVENT (OUTPATIENT)
Dept: ENDOSCOPY | Age: 44
End: 2019-01-11
Payer: COMMERCIAL

## 2019-01-11 PROBLEM — R11.2 NAUSEA AND VOMITING: Status: ACTIVE | Noted: 2019-01-11

## 2019-01-11 PROBLEM — R11.2 INTRACTABLE NAUSEA AND VOMITING: Status: ACTIVE | Noted: 2019-01-11

## 2019-01-11 LAB
AMPHETAMINES: NEGATIVE
BACTERIA: NEGATIVE /HPF
BARBITURATE SCREEN URINE: NEGATIVE
BASE EXCESS MIXED: 0.7 (ref 0–1.2)
BENZODIAZEPINE SCREEN, URINE: NEGATIVE
BILIRUBIN URINE: NEGATIVE MG/DL
BLOOD, URINE: ABNORMAL
CANNABINOID SCREEN URINE: ABNORMAL
CLARITY: CLEAR
COCAINE METABOLITE: NEGATIVE
COLOR: YELLOW
ESTIMATED AVERAGE GLUCOSE: 189 MG/DL
GLUCOSE BLD-MCNC: 131 MG/DL (ref 70–99)
GLUCOSE BLD-MCNC: 142 MG/DL (ref 70–99)
GLUCOSE BLD-MCNC: 170 MG/DL (ref 70–99)
GLUCOSE BLD-MCNC: 172 MG/DL (ref 70–99)
GLUCOSE BLD-MCNC: 176 MG/DL (ref 70–99)
GLUCOSE, URINE: >500 MG/DL
HBA1C MFR BLD: 8.2 % (ref 4.2–6.3)
HCO3 VENOUS: 25.4 MMOL/L (ref 19–25)
KETONES, URINE: ABNORMAL MG/DL
LACTATE: 1.2 MMOL/L (ref 0.4–2)
LEUKOCYTE ESTERASE, URINE: NEGATIVE
LIPASE: 39 IU/L (ref 13–60)
MAGNESIUM: 1.7 MG/DL (ref 1.8–2.4)
MUCUS: ABNORMAL HPF
NITRITE URINE, QUANTITATIVE: NEGATIVE
NON SQUAM EPI CELLS: <1 /HPF
O2 SAT, VEN: 95.5 % (ref 50–70)
OPIATES, URINE: ABNORMAL
OXYCODONE: NEGATIVE
PCO2, VEN: 40 MMHG (ref 38–52)
PH VENOUS: 7.41 (ref 7.32–7.42)
PH, URINE: 7 (ref 5–8)
PHENCYCLIDINE, URINE: NEGATIVE
PO2, VEN: 181 MMHG (ref 28–48)
PROTEIN UA: >500 MG/DL
RBC URINE: 5 /HPF (ref 0–3)
SPECIFIC GRAVITY UA: 1.03 (ref 1–1.03)
SQUAMOUS EPITHELIAL: <1 /HPF
TRICHOMONAS: ABNORMAL /HPF
UROBILINOGEN, URINE: NORMAL MG/DL (ref 0.2–1)
WBC UA: <1 /HPF (ref 0–2)

## 2019-01-11 PROCEDURE — 2580000003 HC RX 258: Performed by: INTERNAL MEDICINE

## 2019-01-11 PROCEDURE — 6370000000 HC RX 637 (ALT 250 FOR IP): Performed by: PSYCHIATRY & NEUROLOGY

## 2019-01-11 PROCEDURE — G0378 HOSPITAL OBSERVATION PER HR: HCPCS

## 2019-01-11 PROCEDURE — 96376 TX/PRO/DX INJ SAME DRUG ADON: CPT

## 2019-01-11 PROCEDURE — 96375 TX/PRO/DX INJ NEW DRUG ADDON: CPT

## 2019-01-11 PROCEDURE — 96372 THER/PROPH/DIAG INJ SC/IM: CPT

## 2019-01-11 PROCEDURE — 96365 THER/PROPH/DIAG IV INF INIT: CPT

## 2019-01-11 PROCEDURE — 6370000000 HC RX 637 (ALT 250 FOR IP): Performed by: INTERNAL MEDICINE

## 2019-01-11 PROCEDURE — 93010 ELECTROCARDIOGRAM REPORT: CPT | Performed by: INTERNAL MEDICINE

## 2019-01-11 PROCEDURE — 94761 N-INVAS EAR/PLS OXIMETRY MLT: CPT

## 2019-01-11 PROCEDURE — 83605 ASSAY OF LACTIC ACID: CPT

## 2019-01-11 PROCEDURE — 36415 COLL VENOUS BLD VENIPUNCTURE: CPT

## 2019-01-11 PROCEDURE — 6360000002 HC RX W HCPCS: Performed by: INTERNAL MEDICINE

## 2019-01-11 PROCEDURE — 83036 HEMOGLOBIN GLYCOSYLATED A1C: CPT

## 2019-01-11 PROCEDURE — C9113 INJ PANTOPRAZOLE SODIUM, VIA: HCPCS | Performed by: INTERNAL MEDICINE

## 2019-01-11 PROCEDURE — 87040 BLOOD CULTURE FOR BACTERIA: CPT

## 2019-01-11 PROCEDURE — 96367 TX/PROPH/DG ADDL SEQ IV INF: CPT

## 2019-01-11 PROCEDURE — 83690 ASSAY OF LIPASE: CPT

## 2019-01-11 PROCEDURE — 93971 EXTREMITY STUDY: CPT

## 2019-01-11 PROCEDURE — 82962 GLUCOSE BLOOD TEST: CPT

## 2019-01-11 PROCEDURE — 99211 OFF/OP EST MAY X REQ PHY/QHP: CPT

## 2019-01-11 PROCEDURE — 80307 DRUG TEST PRSMV CHEM ANLYZR: CPT

## 2019-01-11 PROCEDURE — 6360000002 HC RX W HCPCS: Performed by: EMERGENCY MEDICINE

## 2019-01-11 RX ORDER — NICOTINE POLACRILEX 4 MG
15 LOZENGE BUCCAL PRN
Status: DISCONTINUED | OUTPATIENT
Start: 2019-01-11 | End: 2019-01-12 | Stop reason: HOSPADM

## 2019-01-11 RX ORDER — ASPIRIN 81 MG/1
81 TABLET, CHEWABLE ORAL DAILY
Status: DISCONTINUED | OUTPATIENT
Start: 2019-01-11 | End: 2019-01-12 | Stop reason: HOSPADM

## 2019-01-11 RX ORDER — LIDOCAINE 4 G/G
1 PATCH TOPICAL DAILY
Status: DISCONTINUED | OUTPATIENT
Start: 2019-01-11 | End: 2019-01-12 | Stop reason: HOSPADM

## 2019-01-11 RX ORDER — HYDRALAZINE HYDROCHLORIDE 50 MG/1
50 TABLET, FILM COATED ORAL 3 TIMES DAILY
Status: DISCONTINUED | OUTPATIENT
Start: 2019-01-11 | End: 2019-01-12 | Stop reason: HOSPADM

## 2019-01-11 RX ORDER — LORAZEPAM 2 MG/ML
0.5 INJECTION INTRAMUSCULAR EVERY 6 HOURS PRN
Status: DISCONTINUED | OUTPATIENT
Start: 2019-01-11 | End: 2019-01-12 | Stop reason: HOSPADM

## 2019-01-11 RX ORDER — ONDANSETRON 2 MG/ML
4 INJECTION INTRAMUSCULAR; INTRAVENOUS EVERY 6 HOURS PRN
Status: DISCONTINUED | OUTPATIENT
Start: 2019-01-11 | End: 2019-01-11

## 2019-01-11 RX ORDER — HYDRALAZINE HYDROCHLORIDE 20 MG/ML
10 INJECTION INTRAMUSCULAR; INTRAVENOUS EVERY 6 HOURS PRN
Status: DISCONTINUED | OUTPATIENT
Start: 2019-01-11 | End: 2019-01-11 | Stop reason: CLARIF

## 2019-01-11 RX ORDER — DEXTROSE MONOHYDRATE 25 G/50ML
12.5 INJECTION, SOLUTION INTRAVENOUS PRN
Status: DISCONTINUED | OUTPATIENT
Start: 2019-01-11 | End: 2019-01-12 | Stop reason: HOSPADM

## 2019-01-11 RX ORDER — SODIUM CHLORIDE 9 MG/ML
INJECTION, SOLUTION INTRAVENOUS CONTINUOUS
Status: DISCONTINUED | OUTPATIENT
Start: 2019-01-11 | End: 2019-01-12 | Stop reason: HOSPADM

## 2019-01-11 RX ORDER — BACLOFEN 10 MG/1
10 TABLET ORAL 2 TIMES DAILY
Status: DISCONTINUED | OUTPATIENT
Start: 2019-01-11 | End: 2019-01-12 | Stop reason: HOSPADM

## 2019-01-11 RX ORDER — ONDANSETRON 2 MG/ML
4 INJECTION INTRAMUSCULAR; INTRAVENOUS 3 TIMES DAILY
Status: DISCONTINUED | OUTPATIENT
Start: 2019-01-11 | End: 2019-01-12 | Stop reason: HOSPADM

## 2019-01-11 RX ORDER — POTASSIUM CHLORIDE 7.45 MG/ML
10 INJECTION INTRAVENOUS PRN
Status: DISCONTINUED | OUTPATIENT
Start: 2019-01-11 | End: 2019-01-12 | Stop reason: HOSPADM

## 2019-01-11 RX ORDER — KETOROLAC TROMETHAMINE 30 MG/ML
30 INJECTION, SOLUTION INTRAMUSCULAR; INTRAVENOUS EVERY 6 HOURS PRN
Status: DISCONTINUED | OUTPATIENT
Start: 2019-01-11 | End: 2019-01-12 | Stop reason: HOSPADM

## 2019-01-11 RX ORDER — DEXTROSE MONOHYDRATE 50 MG/ML
100 INJECTION, SOLUTION INTRAVENOUS PRN
Status: DISCONTINUED | OUTPATIENT
Start: 2019-01-11 | End: 2019-01-12 | Stop reason: HOSPADM

## 2019-01-11 RX ORDER — SODIUM CHLORIDE 0.9 % (FLUSH) 0.9 %
10 SYRINGE (ML) INJECTION PRN
Status: DISCONTINUED | OUTPATIENT
Start: 2019-01-11 | End: 2019-01-12 | Stop reason: HOSPADM

## 2019-01-11 RX ORDER — METHOCARBAMOL 750 MG/1
1500 TABLET, FILM COATED ORAL 3 TIMES DAILY PRN
Status: DISCONTINUED | OUTPATIENT
Start: 2019-01-11 | End: 2019-01-12 | Stop reason: HOSPADM

## 2019-01-11 RX ORDER — INSULIN GLARGINE 100 [IU]/ML
50 INJECTION, SOLUTION SUBCUTANEOUS NIGHTLY
Status: DISCONTINUED | OUTPATIENT
Start: 2019-01-11 | End: 2019-01-11

## 2019-01-11 RX ORDER — INSULIN GLARGINE 100 [IU]/ML
50 INJECTION, SOLUTION SUBCUTANEOUS NIGHTLY
Status: DISCONTINUED | OUTPATIENT
Start: 2019-01-11 | End: 2019-01-12 | Stop reason: HOSPADM

## 2019-01-11 RX ORDER — PANTOPRAZOLE SODIUM 40 MG/10ML
40 INJECTION, POWDER, LYOPHILIZED, FOR SOLUTION INTRAVENOUS DAILY
Status: DISCONTINUED | OUTPATIENT
Start: 2019-01-11 | End: 2019-01-12

## 2019-01-11 RX ORDER — MAGNESIUM SULFATE 1 G/100ML
1 INJECTION INTRAVENOUS PRN
Status: DISCONTINUED | OUTPATIENT
Start: 2019-01-11 | End: 2019-01-12 | Stop reason: HOSPADM

## 2019-01-11 RX ORDER — SODIUM CHLORIDE 0.9 % (FLUSH) 0.9 %
10 SYRINGE (ML) INJECTION EVERY 12 HOURS SCHEDULED
Status: DISCONTINUED | OUTPATIENT
Start: 2019-01-11 | End: 2019-01-12 | Stop reason: HOSPADM

## 2019-01-11 RX ORDER — MAGNESIUM SULFATE 1 G/100ML
1 INJECTION INTRAVENOUS ONCE
Status: COMPLETED | OUTPATIENT
Start: 2019-01-11 | End: 2019-01-11

## 2019-01-11 RX ORDER — LISINOPRIL 2.5 MG/1
2.5 TABLET ORAL DAILY
Status: DISCONTINUED | OUTPATIENT
Start: 2019-01-11 | End: 2019-01-12 | Stop reason: HOSPADM

## 2019-01-11 RX ORDER — MORPHINE SULFATE 4 MG/ML
2 INJECTION, SOLUTION INTRAMUSCULAR; INTRAVENOUS EVERY 4 HOURS PRN
Status: DISCONTINUED | OUTPATIENT
Start: 2019-01-11 | End: 2019-01-12 | Stop reason: HOSPADM

## 2019-01-11 RX ORDER — TIZANIDINE 2 MG/1
2 TABLET ORAL EVERY 8 HOURS PRN
Status: DISCONTINUED | OUTPATIENT
Start: 2019-01-11 | End: 2019-01-12 | Stop reason: HOSPADM

## 2019-01-11 RX ADMIN — MAGNESIUM SULFATE HEPTAHYDRATE 1 G: 1 INJECTION, SOLUTION INTRAVENOUS at 00:39

## 2019-01-11 RX ADMIN — MORPHINE SULFATE 2 MG: 4 INJECTION INTRAVENOUS at 18:35

## 2019-01-11 RX ADMIN — DICLOFENAC EPOLAMINE 1 PATCH: 0.01 PATCH TOPICAL at 22:16

## 2019-01-11 RX ADMIN — BACLOFEN 10 MG: 10 TABLET ORAL at 21:25

## 2019-01-11 RX ADMIN — INSULIN LISPRO 2 UNITS: 100 INJECTION, SOLUTION INTRAVENOUS; SUBCUTANEOUS at 09:34

## 2019-01-11 RX ADMIN — INSULIN LISPRO 2 UNITS: 100 INJECTION, SOLUTION INTRAVENOUS; SUBCUTANEOUS at 15:05

## 2019-01-11 RX ADMIN — PANTOPRAZOLE SODIUM 40 MG: 40 INJECTION, POWDER, FOR SOLUTION INTRAVENOUS at 10:34

## 2019-01-11 RX ADMIN — HYDRALAZINE HYDROCHLORIDE 10 MG: 20 INJECTION INTRAMUSCULAR; INTRAVENOUS at 02:38

## 2019-01-11 RX ADMIN — MORPHINE SULFATE 2 MG: 4 INJECTION INTRAVENOUS at 06:48

## 2019-01-11 RX ADMIN — MORPHINE SULFATE 2 MG: 4 INJECTION INTRAVENOUS at 23:31

## 2019-01-11 RX ADMIN — SODIUM CHLORIDE: 9 INJECTION, SOLUTION INTRAVENOUS at 15:02

## 2019-01-11 RX ADMIN — ASPIRIN 81 MG 81 MG: 81 TABLET ORAL at 09:34

## 2019-01-11 RX ADMIN — LORAZEPAM 0.5 MG: 2 INJECTION INTRAMUSCULAR; INTRAVENOUS at 20:54

## 2019-01-11 RX ADMIN — HYDRALAZINE HYDROCHLORIDE 50 MG: 50 TABLET, FILM COATED ORAL at 21:25

## 2019-01-11 RX ADMIN — TIZANIDINE 2 MG: 2 TABLET ORAL at 09:34

## 2019-01-11 RX ADMIN — MORPHINE SULFATE 2 MG: 4 INJECTION INTRAVENOUS at 11:43

## 2019-01-11 RX ADMIN — SODIUM CHLORIDE: 9 INJECTION, SOLUTION INTRAVENOUS at 06:28

## 2019-01-11 RX ADMIN — HYDRALAZINE HYDROCHLORIDE 50 MG: 50 TABLET, FILM COATED ORAL at 09:34

## 2019-01-11 RX ADMIN — ONDANSETRON 4 MG: 2 INJECTION INTRAMUSCULAR; INTRAVENOUS at 02:36

## 2019-01-11 RX ADMIN — ONDANSETRON 4 MG: 2 INJECTION INTRAMUSCULAR; INTRAVENOUS at 08:21

## 2019-01-11 RX ADMIN — MORPHINE SULFATE 2 MG: 4 INJECTION INTRAVENOUS at 02:35

## 2019-01-11 RX ADMIN — SODIUM CHLORIDE: 9 INJECTION, SOLUTION INTRAVENOUS at 22:20

## 2019-01-11 RX ADMIN — HYDRALAZINE HYDROCHLORIDE 50 MG: 50 TABLET, FILM COATED ORAL at 15:02

## 2019-01-11 RX ADMIN — KETOROLAC TROMETHAMINE 30 MG: 30 INJECTION, SOLUTION INTRAMUSCULAR at 20:46

## 2019-01-11 RX ADMIN — KETOROLAC TROMETHAMINE 30 MG: 30 INJECTION, SOLUTION INTRAMUSCULAR at 14:49

## 2019-01-11 RX ADMIN — LISINOPRIL 2.5 MG: 2.5 TABLET ORAL at 09:34

## 2019-01-11 RX ADMIN — ENOXAPARIN SODIUM 40 MG: 40 INJECTION SUBCUTANEOUS at 09:37

## 2019-01-11 RX ADMIN — LINAGLIPTIN 5 MG: 5 TABLET, FILM COATED ORAL at 09:34

## 2019-01-11 RX ADMIN — LORAZEPAM 0.5 MG: 2 INJECTION INTRAMUSCULAR; INTRAVENOUS at 15:02

## 2019-01-11 ASSESSMENT — PAIN DESCRIPTION - PAIN TYPE
TYPE: CHRONIC PAIN
TYPE: ACUTE PAIN;CHRONIC PAIN

## 2019-01-11 ASSESSMENT — PAIN DESCRIPTION - FREQUENCY
FREQUENCY: CONTINUOUS
FREQUENCY: CONTINUOUS

## 2019-01-11 ASSESSMENT — PAIN SCALES - GENERAL
PAINLEVEL_OUTOF10: 10
PAINLEVEL_OUTOF10: 10
PAINLEVEL_OUTOF10: 7
PAINLEVEL_OUTOF10: 7
PAINLEVEL_OUTOF10: 8
PAINLEVEL_OUTOF10: 6
PAINLEVEL_OUTOF10: 8
PAINLEVEL_OUTOF10: 8

## 2019-01-11 ASSESSMENT — PAIN DESCRIPTION - LOCATION
LOCATION: ABDOMEN;BACK
LOCATION: BACK

## 2019-01-11 ASSESSMENT — PAIN DESCRIPTION - ONSET: ONSET: ON-GOING

## 2019-01-11 ASSESSMENT — PAIN DESCRIPTION - DESCRIPTORS
DESCRIPTORS: SHARP
DESCRIPTORS: SHARP;SHOOTING;RADIATING

## 2019-01-11 ASSESSMENT — PAIN DESCRIPTION - ORIENTATION
ORIENTATION: LOWER
ORIENTATION: LOWER;RIGHT

## 2019-01-11 ASSESSMENT — PAIN DESCRIPTION - PROGRESSION: CLINICAL_PROGRESSION: NOT CHANGED

## 2019-01-12 ENCOUNTER — ANESTHESIA (OUTPATIENT)
Dept: ENDOSCOPY | Age: 44
End: 2019-01-12
Payer: COMMERCIAL

## 2019-01-12 ENCOUNTER — APPOINTMENT (OUTPATIENT)
Dept: GENERAL RADIOLOGY | Age: 44
End: 2019-01-12
Payer: COMMERCIAL

## 2019-01-12 VITALS
TEMPERATURE: 98 F | DIASTOLIC BLOOD PRESSURE: 93 MMHG | HEIGHT: 69 IN | WEIGHT: 259.2 LBS | BODY MASS INDEX: 38.39 KG/M2 | OXYGEN SATURATION: 94 % | HEART RATE: 87 BPM | RESPIRATION RATE: 18 BRPM | SYSTOLIC BLOOD PRESSURE: 184 MMHG

## 2019-01-12 VITALS — SYSTOLIC BLOOD PRESSURE: 141 MMHG | OXYGEN SATURATION: 100 % | DIASTOLIC BLOOD PRESSURE: 78 MMHG

## 2019-01-12 LAB
ANION GAP SERPL CALCULATED.3IONS-SCNC: 8 MMOL/L (ref 4–16)
BUN BLDV-MCNC: 12 MG/DL (ref 6–23)
CALCIUM SERPL-MCNC: 8.1 MG/DL (ref 8.3–10.6)
CHLORIDE BLD-SCNC: 106 MMOL/L (ref 99–110)
CO2: 22 MMOL/L (ref 21–32)
CREAT SERPL-MCNC: 1 MG/DL (ref 0.9–1.3)
CULTURE: NORMAL
GFR AFRICAN AMERICAN: >60 ML/MIN/1.73M2
GFR NON-AFRICAN AMERICAN: >60 ML/MIN/1.73M2
GLUCOSE BLD-MCNC: 134 MG/DL (ref 70–99)
GLUCOSE BLD-MCNC: 144 MG/DL (ref 70–99)
GLUCOSE BLD-MCNC: 159 MG/DL (ref 70–99)
HCT VFR BLD CALC: 44.2 % (ref 42–52)
HEMOGLOBIN: 14.7 GM/DL (ref 13.5–18)
Lab: NORMAL
MAGNESIUM: 2.1 MG/DL (ref 1.8–2.4)
MCH RBC QN AUTO: 30.6 PG (ref 27–31)
MCHC RBC AUTO-ENTMCNC: 33.3 % (ref 32–36)
MCV RBC AUTO: 92.1 FL (ref 78–100)
PDW BLD-RTO: 12 % (ref 11.7–14.9)
PLATELET # BLD: 175 K/CU MM (ref 140–440)
PMV BLD AUTO: 10.1 FL (ref 7.5–11.1)
POTASSIUM SERPL-SCNC: 4.1 MMOL/L (ref 3.5–5.1)
RBC # BLD: 4.8 M/CU MM (ref 4.6–6.2)
REPORT STATUS: NORMAL
SODIUM BLD-SCNC: 136 MMOL/L (ref 135–145)
SPECIMEN: NORMAL
WBC # BLD: 6.4 K/CU MM (ref 4–10.5)

## 2019-01-12 PROCEDURE — 3609012400 HC EGD TRANSORAL BIOPSY SINGLE/MULTIPLE: Performed by: INTERNAL MEDICINE

## 2019-01-12 PROCEDURE — 80048 BASIC METABOLIC PNL TOTAL CA: CPT

## 2019-01-12 PROCEDURE — 6360000002 HC RX W HCPCS: Performed by: NURSE ANESTHETIST, CERTIFIED REGISTERED

## 2019-01-12 PROCEDURE — 2580000003 HC RX 258: Performed by: NURSE ANESTHETIST, CERTIFIED REGISTERED

## 2019-01-12 PROCEDURE — G0378 HOSPITAL OBSERVATION PER HR: HCPCS

## 2019-01-12 PROCEDURE — 6370000000 HC RX 637 (ALT 250 FOR IP): Performed by: PSYCHIATRY & NEUROLOGY

## 2019-01-12 PROCEDURE — 6370000000 HC RX 637 (ALT 250 FOR IP): Performed by: INTERNAL MEDICINE

## 2019-01-12 PROCEDURE — 72100 X-RAY EXAM L-S SPINE 2/3 VWS: CPT

## 2019-01-12 PROCEDURE — 36415 COLL VENOUS BLD VENIPUNCTURE: CPT

## 2019-01-12 PROCEDURE — 88305 TISSUE EXAM BY PATHOLOGIST: CPT

## 2019-01-12 PROCEDURE — 96376 TX/PRO/DX INJ SAME DRUG ADON: CPT

## 2019-01-12 PROCEDURE — 82962 GLUCOSE BLOOD TEST: CPT

## 2019-01-12 PROCEDURE — 96372 THER/PROPH/DIAG INJ SC/IM: CPT

## 2019-01-12 PROCEDURE — C9113 INJ PANTOPRAZOLE SODIUM, VIA: HCPCS | Performed by: INTERNAL MEDICINE

## 2019-01-12 PROCEDURE — 3700000001 HC ADD 15 MINUTES (ANESTHESIA): Performed by: INTERNAL MEDICINE

## 2019-01-12 PROCEDURE — 3700000000 HC ANESTHESIA ATTENDED CARE: Performed by: INTERNAL MEDICINE

## 2019-01-12 PROCEDURE — 6360000002 HC RX W HCPCS: Performed by: INTERNAL MEDICINE

## 2019-01-12 PROCEDURE — 2500000003 HC RX 250 WO HCPCS: Performed by: NURSE ANESTHETIST, CERTIFIED REGISTERED

## 2019-01-12 PROCEDURE — 2580000003 HC RX 258: Performed by: INTERNAL MEDICINE

## 2019-01-12 PROCEDURE — 83735 ASSAY OF MAGNESIUM: CPT

## 2019-01-12 PROCEDURE — 6360000002 HC RX W HCPCS: Performed by: NURSE PRACTITIONER

## 2019-01-12 PROCEDURE — 85027 COMPLETE CBC AUTOMATED: CPT

## 2019-01-12 PROCEDURE — 2709999900 HC NON-CHARGEABLE SUPPLY: Performed by: INTERNAL MEDICINE

## 2019-01-12 RX ORDER — PANTOPRAZOLE SODIUM 40 MG/10ML
40 INJECTION, POWDER, LYOPHILIZED, FOR SOLUTION INTRAVENOUS 2 TIMES DAILY
Status: DISCONTINUED | OUTPATIENT
Start: 2019-01-12 | End: 2019-01-12 | Stop reason: HOSPADM

## 2019-01-12 RX ORDER — PANTOPRAZOLE SODIUM 40 MG/1
40 TABLET, DELAYED RELEASE ORAL 2 TIMES DAILY
Qty: 30 TABLET | Refills: 3 | Status: SHIPPED | OUTPATIENT
Start: 2019-01-12 | End: 2019-03-21

## 2019-01-12 RX ORDER — LIDOCAINE HYDROCHLORIDE 20 MG/ML
INJECTION, SOLUTION INFILTRATION; PERINEURAL PRN
Status: DISCONTINUED | OUTPATIENT
Start: 2019-01-12 | End: 2019-01-12 | Stop reason: SDUPTHER

## 2019-01-12 RX ORDER — PROPOFOL 10 MG/ML
INJECTION, EMULSION INTRAVENOUS PRN
Status: DISCONTINUED | OUTPATIENT
Start: 2019-01-12 | End: 2019-01-12 | Stop reason: SDUPTHER

## 2019-01-12 RX ORDER — SODIUM CHLORIDE 9 MG/ML
INJECTION, SOLUTION INTRAVENOUS CONTINUOUS PRN
Status: DISCONTINUED | OUTPATIENT
Start: 2019-01-12 | End: 2019-01-12 | Stop reason: SDUPTHER

## 2019-01-12 RX ORDER — BACLOFEN 10 MG/1
10 TABLET ORAL 2 TIMES DAILY
Qty: 30 TABLET | Refills: 0 | Status: SHIPPED | OUTPATIENT
Start: 2019-01-12 | End: 2019-03-21

## 2019-01-12 RX ADMIN — MORPHINE SULFATE 2 MG: 4 INJECTION INTRAVENOUS at 04:15

## 2019-01-12 RX ADMIN — PANTOPRAZOLE SODIUM 40 MG: 40 INJECTION, POWDER, FOR SOLUTION INTRAVENOUS at 11:44

## 2019-01-12 RX ADMIN — LINAGLIPTIN 5 MG: 5 TABLET, FILM COATED ORAL at 11:44

## 2019-01-12 RX ADMIN — SODIUM CHLORIDE: 9 INJECTION, SOLUTION INTRAVENOUS at 10:10

## 2019-01-12 RX ADMIN — LORAZEPAM 0.5 MG: 2 INJECTION INTRAMUSCULAR; INTRAVENOUS at 12:58

## 2019-01-12 RX ADMIN — DICLOFENAC EPOLAMINE 1 PATCH: 0.01 PATCH TOPICAL at 11:46

## 2019-01-12 RX ADMIN — KETOROLAC TROMETHAMINE 30 MG: 30 INJECTION, SOLUTION INTRAMUSCULAR at 06:41

## 2019-01-12 RX ADMIN — PROPOFOL 50 MG: 10 INJECTION, EMULSION INTRAVENOUS at 10:13

## 2019-01-12 RX ADMIN — ENOXAPARIN SODIUM 40 MG: 40 INJECTION SUBCUTANEOUS at 11:45

## 2019-01-12 RX ADMIN — LORAZEPAM 0.5 MG: 2 INJECTION INTRAMUSCULAR; INTRAVENOUS at 06:48

## 2019-01-12 RX ADMIN — HYDRALAZINE HYDROCHLORIDE 50 MG: 50 TABLET, FILM COATED ORAL at 11:45

## 2019-01-12 RX ADMIN — MORPHINE SULFATE 2 MG: 4 INJECTION INTRAVENOUS at 15:05

## 2019-01-12 RX ADMIN — METHOCARBAMOL 1500 MG: 750 TABLET ORAL at 14:05

## 2019-01-12 RX ADMIN — SODIUM CHLORIDE: 9 INJECTION, SOLUTION INTRAVENOUS at 06:42

## 2019-01-12 RX ADMIN — LIDOCAINE HYDROCHLORIDE 100 MG: 20 INJECTION, SOLUTION INFILTRATION; PERINEURAL at 10:13

## 2019-01-12 RX ADMIN — KETOROLAC TROMETHAMINE 30 MG: 30 INJECTION, SOLUTION INTRAMUSCULAR at 12:49

## 2019-01-12 RX ADMIN — BACLOFEN 10 MG: 10 TABLET ORAL at 11:45

## 2019-01-12 RX ADMIN — ONDANSETRON 4 MG: 2 INJECTION INTRAMUSCULAR; INTRAVENOUS at 11:44

## 2019-01-12 RX ADMIN — SODIUM CHLORIDE: 9 INJECTION, SOLUTION INTRAVENOUS at 12:59

## 2019-01-12 RX ADMIN — PROPOFOL 50 MG: 10 INJECTION, EMULSION INTRAVENOUS at 10:14

## 2019-01-12 RX ADMIN — HYDRALAZINE HYDROCHLORIDE 10 MG: 20 INJECTION INTRAMUSCULAR; INTRAVENOUS at 12:51

## 2019-01-12 RX ADMIN — HYDRALAZINE HYDROCHLORIDE 50 MG: 50 TABLET, FILM COATED ORAL at 14:00

## 2019-01-12 RX ADMIN — ONDANSETRON 4 MG: 2 INJECTION INTRAMUSCULAR; INTRAVENOUS at 13:59

## 2019-01-12 RX ADMIN — ASPIRIN 81 MG 81 MG: 81 TABLET ORAL at 11:44

## 2019-01-12 RX ADMIN — LISINOPRIL 2.5 MG: 2.5 TABLET ORAL at 11:44

## 2019-01-12 ASSESSMENT — PAIN DESCRIPTION - PAIN TYPE
TYPE: CHRONIC PAIN
TYPE: CHRONIC PAIN

## 2019-01-12 ASSESSMENT — PAIN DESCRIPTION - ORIENTATION: ORIENTATION: LOWER

## 2019-01-12 ASSESSMENT — PAIN SCALES - GENERAL
PAINLEVEL_OUTOF10: 8
PAINLEVEL_OUTOF10: 9
PAINLEVEL_OUTOF10: 8
PAINLEVEL_OUTOF10: 9

## 2019-01-12 ASSESSMENT — PAIN DESCRIPTION - DESCRIPTORS
DESCRIPTORS: SHARP
DESCRIPTORS: SHARP;SHOOTING

## 2019-01-12 ASSESSMENT — PAIN DESCRIPTION - DIRECTION
RADIATING_TOWARDS: RT HIP
RADIATING_TOWARDS: RIGHT HIP AND LEG

## 2019-01-12 ASSESSMENT — PAIN DESCRIPTION - FREQUENCY
FREQUENCY: CONTINUOUS
FREQUENCY: CONTINUOUS

## 2019-01-12 ASSESSMENT — PAIN DESCRIPTION - ONSET: ONSET: ON-GOING

## 2019-01-12 ASSESSMENT — PAIN DESCRIPTION - LOCATION
LOCATION: BACK
LOCATION: BACK;HIP

## 2019-01-14 LAB
EKG ATRIAL RATE: 98 BPM
EKG DIAGNOSIS: NORMAL
EKG P AXIS: 28 DEGREES
EKG P-R INTERVAL: 184 MS
EKG Q-T INTERVAL: 348 MS
EKG QRS DURATION: 82 MS
EKG QTC CALCULATION (BAZETT): 444 MS
EKG R AXIS: 44 DEGREES
EKG T AXIS: 40 DEGREES
EKG VENTRICULAR RATE: 98 BPM

## 2019-01-16 LAB
CULTURE: NORMAL
CULTURE: NORMAL
Lab: NORMAL
Lab: NORMAL
REPORT STATUS: NORMAL
REPORT STATUS: NORMAL
SPECIMEN: NORMAL
SPECIMEN: NORMAL

## 2019-03-21 ENCOUNTER — APPOINTMENT (OUTPATIENT)
Dept: GENERAL RADIOLOGY | Age: 44
DRG: 314 | End: 2019-03-21
Payer: COMMERCIAL

## 2019-03-21 ENCOUNTER — APPOINTMENT (OUTPATIENT)
Dept: ULTRASOUND IMAGING | Age: 44
DRG: 314 | End: 2019-03-21
Payer: COMMERCIAL

## 2019-03-21 ENCOUNTER — HOSPITAL ENCOUNTER (INPATIENT)
Age: 44
LOS: 5 days | Discharge: HOME | DRG: 314 | End: 2019-03-26
Attending: EMERGENCY MEDICINE | Admitting: HOSPITALIST
Payer: COMMERCIAL

## 2019-03-21 DIAGNOSIS — L08.9 DIABETIC FOOT INFECTION (HCC): ICD-10-CM

## 2019-03-21 DIAGNOSIS — L03.119 CELLULITIS OF LOWER EXTREMITY, UNSPECIFIED LATERALITY: Primary | ICD-10-CM

## 2019-03-21 DIAGNOSIS — E11.628 DIABETIC FOOT INFECTION (HCC): ICD-10-CM

## 2019-03-21 LAB
ALBUMIN SERPL-MCNC: 3.1 GM/DL (ref 3.4–5)
ALP BLD-CCNC: 55 IU/L (ref 40–129)
ALT SERPL-CCNC: 20 U/L (ref 10–40)
ANION GAP SERPL CALCULATED.3IONS-SCNC: 13 MMOL/L (ref 4–16)
AST SERPL-CCNC: 16 IU/L (ref 15–37)
BASOPHILS ABSOLUTE: 0.1 K/CU MM
BASOPHILS RELATIVE PERCENT: 0.6 % (ref 0–1)
BILIRUB SERPL-MCNC: 0.3 MG/DL (ref 0–1)
BUN BLDV-MCNC: 29 MG/DL (ref 6–23)
CALCIUM SERPL-MCNC: 7.9 MG/DL (ref 8.3–10.6)
CHLORIDE BLD-SCNC: 99 MMOL/L (ref 99–110)
CHP ED QC CHECK: YES
CO2: 21 MMOL/L (ref 21–32)
CREAT SERPL-MCNC: 1.2 MG/DL (ref 0.9–1.3)
DIFFERENTIAL TYPE: ABNORMAL
EOSINOPHILS ABSOLUTE: 0.2 K/CU MM
EOSINOPHILS RELATIVE PERCENT: 1.9 % (ref 0–3)
ESTIMATED AVERAGE GLUCOSE: 203 MG/DL
GFR AFRICAN AMERICAN: >60 ML/MIN/1.73M2
GFR NON-AFRICAN AMERICAN: >60 ML/MIN/1.73M2
GLUCOSE BLD-MCNC: 247 MG/DL (ref 70–99)
GLUCOSE BLD-MCNC: 268 MG/DL (ref 70–99)
GLUCOSE BLD-MCNC: 278 MG/DL (ref 70–99)
GLUCOSE BLD-MCNC: 284 MG/DL (ref 70–99)
GLUCOSE BLD-MCNC: 305 MG/DL (ref 70–99)
GLUCOSE BLD-MCNC: 351 MG/DL
GLUCOSE BLD-MCNC: 351 MG/DL (ref 70–99)
HBA1C MFR BLD: 8.7 % (ref 4.2–6.3)
HCT VFR BLD CALC: 36.6 % (ref 42–52)
HEMOGLOBIN: 12.5 GM/DL (ref 13.5–18)
IMMATURE NEUTROPHIL %: 0.5 % (ref 0–0.43)
LACTATE: 2.2 MMOL/L (ref 0.4–2)
LACTATE: ABNORMAL MMOL/L (ref 0.4–2)
LV EF: 58 %
LVEF MODALITY: NORMAL
LYMPHOCYTES ABSOLUTE: 2 K/CU MM
LYMPHOCYTES RELATIVE PERCENT: 21 % (ref 24–44)
MCH RBC QN AUTO: 31.3 PG (ref 27–31)
MCHC RBC AUTO-ENTMCNC: 34.2 % (ref 32–36)
MCV RBC AUTO: 91.7 FL (ref 78–100)
MONOCYTES ABSOLUTE: 1 K/CU MM
MONOCYTES RELATIVE PERCENT: 9.9 % (ref 0–4)
NUCLEATED RBC %: 0 %
PDW BLD-RTO: 12.5 % (ref 11.7–14.9)
PLATELET # BLD: 213 K/CU MM (ref 140–440)
PMV BLD AUTO: 11.2 FL (ref 7.5–11.1)
POTASSIUM SERPL-SCNC: 4.1 MMOL/L (ref 3.5–5.1)
RBC # BLD: 3.99 M/CU MM (ref 4.6–6.2)
SEGMENTED NEUTROPHILS ABSOLUTE COUNT: 6.4 K/CU MM
SEGMENTED NEUTROPHILS RELATIVE PERCENT: 66.1 % (ref 36–66)
SODIUM BLD-SCNC: 133 MMOL/L (ref 135–145)
TOTAL IMMATURE NEUTOROPHIL: 0.05 K/CU MM
TOTAL NUCLEATED RBC: 0 K/CU MM
TOTAL PROTEIN: 6.5 GM/DL (ref 6.4–8.2)
TROPONIN T: <0.01 NG/ML
WBC # BLD: 9.7 K/CU MM (ref 4–10.5)

## 2019-03-21 PROCEDURE — 93970 EXTREMITY STUDY: CPT

## 2019-03-21 PROCEDURE — 36415 COLL VENOUS BLD VENIPUNCTURE: CPT

## 2019-03-21 PROCEDURE — 85025 COMPLETE CBC W/AUTO DIFF WBC: CPT

## 2019-03-21 PROCEDURE — 82962 GLUCOSE BLOOD TEST: CPT

## 2019-03-21 PROCEDURE — 2580000003 HC RX 258: Performed by: PHYSICIAN ASSISTANT

## 2019-03-21 PROCEDURE — 6370000000 HC RX 637 (ALT 250 FOR IP): Performed by: HOSPITALIST

## 2019-03-21 PROCEDURE — 84484 ASSAY OF TROPONIN QUANT: CPT

## 2019-03-21 PROCEDURE — 93306 TTE W/DOPPLER COMPLETE: CPT

## 2019-03-21 PROCEDURE — 99285 EMERGENCY DEPT VISIT HI MDM: CPT

## 2019-03-21 PROCEDURE — 6360000002 HC RX W HCPCS

## 2019-03-21 PROCEDURE — 93005 ELECTROCARDIOGRAM TRACING: CPT | Performed by: EMERGENCY MEDICINE

## 2019-03-21 PROCEDURE — 1200000000 HC SEMI PRIVATE

## 2019-03-21 PROCEDURE — 80053 COMPREHEN METABOLIC PANEL: CPT

## 2019-03-21 PROCEDURE — 71045 X-RAY EXAM CHEST 1 VIEW: CPT

## 2019-03-21 PROCEDURE — 6360000002 HC RX W HCPCS: Performed by: HOSPITALIST

## 2019-03-21 PROCEDURE — 96367 TX/PROPH/DG ADDL SEQ IV INF: CPT

## 2019-03-21 PROCEDURE — 96365 THER/PROPH/DIAG IV INF INIT: CPT

## 2019-03-21 PROCEDURE — 2580000003 HC RX 258: Performed by: HOSPITALIST

## 2019-03-21 PROCEDURE — 73630 X-RAY EXAM OF FOOT: CPT

## 2019-03-21 PROCEDURE — 6360000002 HC RX W HCPCS: Performed by: PHYSICIAN ASSISTANT

## 2019-03-21 PROCEDURE — 83036 HEMOGLOBIN GLYCOSYLATED A1C: CPT

## 2019-03-21 PROCEDURE — 93010 ELECTROCARDIOGRAM REPORT: CPT | Performed by: INTERNAL MEDICINE

## 2019-03-21 PROCEDURE — 83605 ASSAY OF LACTIC ACID: CPT

## 2019-03-21 PROCEDURE — 96375 TX/PRO/DX INJ NEW DRUG ADDON: CPT

## 2019-03-21 RX ORDER — MORPHINE SULFATE 4 MG/ML
4 INJECTION, SOLUTION INTRAMUSCULAR; INTRAVENOUS ONCE
Status: COMPLETED | OUTPATIENT
Start: 2019-03-21 | End: 2019-03-21

## 2019-03-21 RX ORDER — ASPIRIN 81 MG/1
81 TABLET, CHEWABLE ORAL DAILY
Status: DISCONTINUED | OUTPATIENT
Start: 2019-03-21 | End: 2019-03-26 | Stop reason: HOSPADM

## 2019-03-21 RX ORDER — INSULIN GLARGINE 100 [IU]/ML
40 INJECTION, SOLUTION SUBCUTANEOUS NIGHTLY
Status: DISCONTINUED | OUTPATIENT
Start: 2019-03-21 | End: 2019-03-22

## 2019-03-21 RX ORDER — ACETAMINOPHEN 80 MG
TABLET,CHEWABLE ORAL
Status: COMPLETED
Start: 2019-03-21 | End: 2019-03-21

## 2019-03-21 RX ORDER — BACLOFEN 10 MG/1
10 TABLET ORAL 2 TIMES DAILY
Status: DISCONTINUED | OUTPATIENT
Start: 2019-03-21 | End: 2019-03-26 | Stop reason: HOSPADM

## 2019-03-21 RX ORDER — VANCOMYCIN HYDROCHLORIDE 1 G/200ML
1000 INJECTION, SOLUTION INTRAVENOUS ONCE
Status: COMPLETED | OUTPATIENT
Start: 2019-03-21 | End: 2019-03-21

## 2019-03-21 RX ORDER — SODIUM CHLORIDE 0.9 % (FLUSH) 0.9 %
10 SYRINGE (ML) INJECTION EVERY 12 HOURS SCHEDULED
Status: DISCONTINUED | OUTPATIENT
Start: 2019-03-21 | End: 2019-03-26 | Stop reason: HOSPADM

## 2019-03-21 RX ORDER — TIZANIDINE 2 MG/1
2 TABLET ORAL EVERY 8 HOURS PRN
Status: ON HOLD | COMMUNITY
End: 2021-04-04 | Stop reason: ALTCHOICE

## 2019-03-21 RX ORDER — OXYCODONE HYDROCHLORIDE 5 MG/1
5 TABLET ORAL EVERY 4 HOURS PRN
Status: DISCONTINUED | OUTPATIENT
Start: 2019-03-21 | End: 2019-03-21

## 2019-03-21 RX ORDER — 0.9 % SODIUM CHLORIDE 0.9 %
1000 INTRAVENOUS SOLUTION INTRAVENOUS ONCE
Status: COMPLETED | OUTPATIENT
Start: 2019-03-21 | End: 2019-03-21

## 2019-03-21 RX ORDER — PANTOPRAZOLE SODIUM 40 MG/1
40 TABLET, DELAYED RELEASE ORAL
Status: DISCONTINUED | OUTPATIENT
Start: 2019-03-21 | End: 2019-03-26 | Stop reason: HOSPADM

## 2019-03-21 RX ORDER — ONDANSETRON 2 MG/ML
4 INJECTION INTRAMUSCULAR; INTRAVENOUS EVERY 6 HOURS PRN
Status: DISCONTINUED | OUTPATIENT
Start: 2019-03-21 | End: 2019-03-26 | Stop reason: HOSPADM

## 2019-03-21 RX ORDER — DEXTROSE MONOHYDRATE 50 MG/ML
100 INJECTION, SOLUTION INTRAVENOUS PRN
Status: DISCONTINUED | OUTPATIENT
Start: 2019-03-21 | End: 2019-03-26 | Stop reason: HOSPADM

## 2019-03-21 RX ORDER — ONDANSETRON 2 MG/ML
INJECTION INTRAMUSCULAR; INTRAVENOUS
Status: COMPLETED
Start: 2019-03-21 | End: 2019-03-21

## 2019-03-21 RX ORDER — MORPHINE SULFATE 4 MG/ML
2 INJECTION, SOLUTION INTRAMUSCULAR; INTRAVENOUS EVERY 4 HOURS PRN
Status: DISPENSED | OUTPATIENT
Start: 2019-03-21 | End: 2019-03-22

## 2019-03-21 RX ORDER — DEXTROSE MONOHYDRATE 25 G/50ML
12.5 INJECTION, SOLUTION INTRAVENOUS PRN
Status: DISCONTINUED | OUTPATIENT
Start: 2019-03-21 | End: 2019-03-26 | Stop reason: HOSPADM

## 2019-03-21 RX ORDER — SODIUM CHLORIDE 9 MG/ML
INJECTION, SOLUTION INTRAVENOUS CONTINUOUS
Status: DISCONTINUED | OUTPATIENT
Start: 2019-03-21 | End: 2019-03-26 | Stop reason: HOSPADM

## 2019-03-21 RX ORDER — HYDRALAZINE HYDROCHLORIDE 50 MG/1
50 TABLET, FILM COATED ORAL 3 TIMES DAILY
Status: DISCONTINUED | OUTPATIENT
Start: 2019-03-21 | End: 2019-03-26 | Stop reason: HOSPADM

## 2019-03-21 RX ORDER — ACETAMINOPHEN 325 MG/1
650 TABLET ORAL EVERY 4 HOURS PRN
Status: DISCONTINUED | OUTPATIENT
Start: 2019-03-21 | End: 2019-03-26 | Stop reason: HOSPADM

## 2019-03-21 RX ORDER — NICOTINE POLACRILEX 4 MG
15 LOZENGE BUCCAL PRN
Status: DISCONTINUED | OUTPATIENT
Start: 2019-03-21 | End: 2019-03-26 | Stop reason: HOSPADM

## 2019-03-21 RX ORDER — SODIUM CHLORIDE 0.9 % (FLUSH) 0.9 %
10 SYRINGE (ML) INJECTION PRN
Status: DISCONTINUED | OUTPATIENT
Start: 2019-03-21 | End: 2019-03-26 | Stop reason: HOSPADM

## 2019-03-21 RX ORDER — OXYCODONE HYDROCHLORIDE 5 MG/1
7.5 TABLET ORAL EVERY 6 HOURS PRN
Status: DISCONTINUED | OUTPATIENT
Start: 2019-03-21 | End: 2019-03-26 | Stop reason: HOSPADM

## 2019-03-21 RX ORDER — LISINOPRIL 2.5 MG/1
2.5 TABLET ORAL DAILY
Status: DISCONTINUED | OUTPATIENT
Start: 2019-03-21 | End: 2019-03-26 | Stop reason: HOSPADM

## 2019-03-21 RX ADMIN — ONDANSETRON 4 MG: 2 INJECTION INTRAMUSCULAR; INTRAVENOUS at 04:13

## 2019-03-21 RX ADMIN — PANTOPRAZOLE SODIUM 40 MG: 40 TABLET, DELAYED RELEASE ORAL at 09:02

## 2019-03-21 RX ADMIN — INSULIN LISPRO 3 UNITS: 100 INJECTION, SOLUTION INTRAVENOUS; SUBCUTANEOUS at 17:14

## 2019-03-21 RX ADMIN — BACLOFEN 10 MG: 10 TABLET ORAL at 09:01

## 2019-03-21 RX ADMIN — INSULIN GLARGINE 40 UNITS: 100 INJECTION, SOLUTION SUBCUTANEOUS at 21:47

## 2019-03-21 RX ADMIN — TAZOBACTAM SODIUM AND PIPERACILLIN SODIUM 3.38 G: 375; 3 INJECTION, SOLUTION INTRAVENOUS at 21:45

## 2019-03-21 RX ADMIN — OXYCODONE HYDROCHLORIDE 5 MG: 5 TABLET ORAL at 06:01

## 2019-03-21 RX ADMIN — VANCOMYCIN HYDROCHLORIDE 1500 MG: 5 INJECTION, POWDER, LYOPHILIZED, FOR SOLUTION INTRAVENOUS at 22:41

## 2019-03-21 RX ADMIN — BACLOFEN 10 MG: 10 TABLET ORAL at 21:27

## 2019-03-21 RX ADMIN — OXYCODONE HYDROCHLORIDE 7.5 MG: 5 TABLET ORAL at 19:08

## 2019-03-21 RX ADMIN — SODIUM CHLORIDE, PRESERVATIVE FREE 10 ML: 5 INJECTION INTRAVENOUS at 09:04

## 2019-03-21 RX ADMIN — SODIUM CHLORIDE, PRESERVATIVE FREE 10 ML: 5 INJECTION INTRAVENOUS at 21:41

## 2019-03-21 RX ADMIN — TAZOBACTAM SODIUM AND PIPERACILLIN SODIUM 3.38 G: 375; 3 INJECTION, SOLUTION INTRAVENOUS at 03:29

## 2019-03-21 RX ADMIN — OXYCODONE HYDROCHLORIDE 7.5 MG: 5 TABLET ORAL at 09:04

## 2019-03-21 RX ADMIN — SODIUM CHLORIDE 1000 ML: 9 INJECTION, SOLUTION INTRAVENOUS at 03:29

## 2019-03-21 RX ADMIN — ENOXAPARIN SODIUM 40 MG: 40 INJECTION SUBCUTANEOUS at 09:02

## 2019-03-21 RX ADMIN — HYDRALAZINE HYDROCHLORIDE 50 MG: 50 TABLET ORAL at 21:26

## 2019-03-21 RX ADMIN — MORPHINE SULFATE 2 MG: 4 INJECTION INTRAVENOUS at 21:40

## 2019-03-21 RX ADMIN — INSULIN LISPRO 3 UNITS: 100 INJECTION, SOLUTION INTRAVENOUS; SUBCUTANEOUS at 14:09

## 2019-03-21 RX ADMIN — VANCOMYCIN HYDROCHLORIDE 1000 MG: 1 INJECTION, SOLUTION INTRAVENOUS at 07:09

## 2019-03-21 RX ADMIN — MORPHINE SULFATE 2 MG: 4 INJECTION INTRAVENOUS at 15:07

## 2019-03-21 RX ADMIN — TAZOBACTAM SODIUM AND PIPERACILLIN SODIUM 3.38 G: 375; 3 INJECTION, SOLUTION INTRAVENOUS at 09:02

## 2019-03-21 RX ADMIN — SODIUM CHLORIDE: 9 INJECTION, SOLUTION INTRAVENOUS at 14:11

## 2019-03-21 RX ADMIN — ONDANSETRON 4 MG: 2 INJECTION INTRAMUSCULAR; INTRAVENOUS at 19:08

## 2019-03-21 RX ADMIN — DICLOFENAC EPOLAMINE 1 PATCH: 0.01 PATCH TOPICAL at 09:03

## 2019-03-21 RX ADMIN — PANTOPRAZOLE SODIUM 40 MG: 40 TABLET, DELAYED RELEASE ORAL at 16:20

## 2019-03-21 RX ADMIN — MORPHINE SULFATE 4 MG: 4 INJECTION INTRAVENOUS at 03:56

## 2019-03-21 RX ADMIN — TAZOBACTAM SODIUM AND PIPERACILLIN SODIUM 3.38 G: 375; 3 INJECTION, SOLUTION INTRAVENOUS at 16:20

## 2019-03-21 RX ADMIN — Medication: at 09:03

## 2019-03-21 RX ADMIN — VANCOMYCIN HYDROCHLORIDE 1500 MG: 5 INJECTION, POWDER, LYOPHILIZED, FOR SOLUTION INTRAVENOUS at 14:16

## 2019-03-21 RX ADMIN — ASPIRIN 81 MG 81 MG: 81 TABLET ORAL at 09:01

## 2019-03-21 RX ADMIN — SODIUM CHLORIDE: 9 INJECTION, SOLUTION INTRAVENOUS at 06:03

## 2019-03-21 RX ADMIN — LISINOPRIL 2.5 MG: 2.5 TABLET ORAL at 09:01

## 2019-03-21 RX ADMIN — VANCOMYCIN HYDROCHLORIDE 1000 MG: 1 INJECTION, SOLUTION INTRAVENOUS at 04:03

## 2019-03-21 RX ADMIN — HYDRALAZINE HYDROCHLORIDE 50 MG: 50 TABLET ORAL at 14:10

## 2019-03-21 RX ADMIN — INSULIN LISPRO 1 UNITS: 100 INJECTION, SOLUTION INTRAVENOUS; SUBCUTANEOUS at 21:46

## 2019-03-21 ASSESSMENT — PAIN DESCRIPTION - FREQUENCY
FREQUENCY: CONTINUOUS
FREQUENCY: CONTINUOUS

## 2019-03-21 ASSESSMENT — PAIN DESCRIPTION - LOCATION
LOCATION: TOE (COMMENT WHICH ONE)
LOCATION: TOE (COMMENT WHICH ONE)
LOCATION: FOOT
LOCATION: TOE (COMMENT WHICH ONE)

## 2019-03-21 ASSESSMENT — PAIN SCALES - GENERAL
PAINLEVEL_OUTOF10: 7
PAINLEVEL_OUTOF10: 2
PAINLEVEL_OUTOF10: 0
PAINLEVEL_OUTOF10: 7
PAINLEVEL_OUTOF10: 2
PAINLEVEL_OUTOF10: 8
PAINLEVEL_OUTOF10: 9
PAINLEVEL_OUTOF10: 8

## 2019-03-21 ASSESSMENT — PAIN DESCRIPTION - PAIN TYPE
TYPE: ACUTE PAIN

## 2019-03-21 ASSESSMENT — PAIN DESCRIPTION - PROGRESSION
CLINICAL_PROGRESSION: GRADUALLY WORSENING
CLINICAL_PROGRESSION: NOT CHANGED

## 2019-03-21 ASSESSMENT — PAIN DESCRIPTION - ORIENTATION
ORIENTATION: RIGHT;ANTERIOR
ORIENTATION: RIGHT;LEFT
ORIENTATION: RIGHT;ANTERIOR

## 2019-03-21 ASSESSMENT — PAIN DESCRIPTION - ONSET
ONSET: ON-GOING
ONSET: ON-GOING

## 2019-03-21 ASSESSMENT — PAIN - FUNCTIONAL ASSESSMENT: PAIN_FUNCTIONAL_ASSESSMENT: PREVENTS OR INTERFERES WITH ALL ACTIVE AND SOME PASSIVE ACTIVITIES

## 2019-03-21 ASSESSMENT — PAIN DESCRIPTION - DIRECTION: RADIATING_TOWARDS: R CALF

## 2019-03-21 ASSESSMENT — PAIN DESCRIPTION - DESCRIPTORS: DESCRIPTORS: BURNING;PINS AND NEEDLES

## 2019-03-21 NOTE — H&P
History and Physical      Name:  Tito Reagan /Age/Sex: 1975  (37 y.o. male)   MRN & CSN:  [id number] & [id number] Admission Date/Time: 3/21/2019  2:28 AM   Location:  [address]- PCP: Mio Rossi MD       Hospital Day: 1    Assessment and Plan:   Tito Reagan is a 37 y.o.  male  who presents with right toe pain, dx with diabetic foot infection. > diabetic foot infection  - concern about OM  - admit, IV Vanc, zosyn, consult podiatry, NPO  - pain control. Doppler neg for DVT.     > DM type 2 uncontrolled  - lantus, SSI, consult endo    > dizziness, dyspnea episodes  - BP and CXR WNL  - check echo, cardiac tele, IVF    > elevated lactate  - sec to above, IVF, repeat level. Diet No diet orders on file   DVT Prophylaxis [x] Lovenox, []  Heparin, [] SCDs, [] No VTE prophylaxis, patient ambulating   Code Status Prior     History of Present Illness:     Chief Complaint:  right toe pain, dx with diabetic foot infection. Tito Reagan is a 37 y.o.  male  who presents with  right toe pain, dx with diabetic foot infection. Pt presented with 1 week h/o worsening right toe pain, worse with palpation, better with not, associated redness and localized edema, no F/C, no chest pain. Pt also reported dizziness as light headedness and vertigo for 4 days, then developed nausea and diarrhea a day ago, watery stool, abd cramping. No change in medications, no palpitations. Reported occ dyspnea. 10-14 point ROS reviewed negative, unless as noted above     Objective:   No intake or output data in the 24 hours ending 19 0429   Vitals:   Vitals:    19 0404   BP: 138/81   Pulse: 82   Resp: 14   Temp:    SpO2: 98%     Physical Exam:    GEN Awake male, sitting upright in bed in no apparent distress. Obese,  Appears given age. EYES Pupils are equally round. No scleral erythema, discharge, or conjunctivitis.   HENT Mucous membranes are moist.   NECK No apparent thyromegaly or masses. RESP Clear to auscultation, no wheezes, rales or rhonchi. Symmetric chest movement . CARDIO/VASC S1/S2 auscultated. Regular rate without appreciable murmurs, rubs, or gallops. No peripheral edema. GI Abdomen is soft without significant tenderness, or guarding. Bowel sounds are normoactive. Rectal exam deferred.  Luna catheter is not present. MSK/SKIN Right 5th toe with dorsal ulcerated lesion, left foot with black discolored plantar lesion. NEURO Cranial nerves appear grossly intact, normal speech, no lateralizing weakness. PSYCH Awake, alert, oriented x 4. Affect appropriate. Past Medical History:      Past Medical History:   Diagnosis Date    Abscess 2010    scrotal    Anxiety associated with depression     Back pain 7/2/2012    Chest pain 5/1/2013    Diabetic nephropathy ([address]     Diabetic neuropathy ([address] 12/22/2011    Diabetic ulcer of left midfoot associated with type 2 diabetes mellitus, with fat layer exposed ([address] 7/18/2017    Diverticulosis     C scope + Dr. Oswaldo Contreras DM (diabetes mellitus), type 2 ([address] 2002    DR. Turner podiatry    Hyperlipidemia LDL goal < 100 7/15/2013    Hypertension     Internal hemorrhoid     C scope + Dr. Adkins Schuster fracture 1988    Panic attacks     Pericarditis 2003    Hospitalized with s/p heart cath normal    Peripheral autonomic neuropathy due to diabetes mellitus ([address]     Axonal EMG- [facility], March 2011    Sebaceous cyst 9/1/2011    URI (upper respiratory infection) 2/27/2012    WD-Wound, surgical, infected, initial encounter 12/8/2017    Wrist fracture 1986     PSHX:  has a past surgical history that includes Cardiac catheterization (2003, 2013); Tonsillectomy and adenoidectomy (1988); Upper gastrointestinal endoscopy (06/2/2011); Colonoscopy (6/2/2011); Nose surgery (1993); skin biopsy (N/A, [id number]); other surgical history (Right, 05/22/2015); Toe amputation;  Abdomen surgery; other surgical history (12/04/2017); pr deep incis foot bone infectn (Left, 2018); and Upper gastrointestinal endoscopy (N/A, 2019). Allergies: Allergies   Allergen Reactions    Adhesive Tape Rash    Doxycycline Nausea And Vomiting    Reglan [Metoclopramide] Anxiety       FAM HX: family history includes ADHD in his daughter; Bleeding Prob in his mother; Cancer in his mother; Diabetes in his father and sister; Heart Disease in his father; High Blood Pressure in his father, sister, and another family member; Kidney Disease in his father; Mental Illness in his sister and another family member; Obesity in his father and sister; Other in his son; Stroke in his mother.   Soc HX:   Social History     Socioeconomic History    Marital status:      Spouse name: None    Number of children: None    Years of education: None    Highest education level: None   Occupational History    None   Social Needs    Financial resource strain: None    Food insecurity:     Worry: None     Inability: None    Transportation needs:     Medical: None     Non-medical: None   Tobacco Use    Smoking status: Former Smoker     Years: 20.00     Last attempt to quit: 2017     Years since quittin.3    Smokeless tobacco: Never Used   Substance and Sexual Activity    Alcohol use: No    Drug use: Yes     Frequency: 7.0 times per week     Types: Marijuana    Sexual activity: Yes   Lifestyle    Physical activity:     Days per week: None     Minutes per session: None    Stress: None   Relationships    Social connections:     Talks on phone: None     Gets together: None     Attends Orthodoxy service: None     Active member of club or organization: None     Attends meetings of clubs or organizations: None     Relationship status: None    Intimate partner violence:     Fear of current or ex partner: None     Emotionally abused: None     Physically abused: None     Forced sexual activity: None   Other Topics Concern    None   Social History Narrative    None Medications:   Medications:   Prior to Admission medications    Medication Sig Start Date End Date Taking? Authorizing Provider   baclofen (LIORESAL) 10 MG tablet Take 1 tablet by mouth 2 times daily 1/12/19   Kashmir Stevens MD   diclofenac (FLECTOR) 1.3 % patch Place 1 patch onto the skin 2 times daily 1/12/19   Kashmir Stevens MD   pantoprazole (PROTONIX) 40 MG tablet Take 1 tablet by mouth 2 times daily 1/12/19   Kashmir Stevens MD   hydrALAZINE (APRESOLINE) 50 MG tablet Take 50 mg by mouth 3 times daily    Historical Provider, MD   insulin lispro (HUMALOG) 100 UNIT/ML injection vial Inject 30 Units into the skin 3 times daily (before meals)    Historical Provider, MD   lidocaine (LIDODERM) 5 % Place 1 patch onto the skin daily 12 hours on, 12 hours off. Historical Provider, MD   lisinopril (PRINIVIL;ZESTRIL) 2.5 MG tablet Take 2.5 mg by mouth daily    Historical Provider, MD   naproxen (NAPROSYN) 500 MG tablet Take 500 mg by mouth 2 times daily (with meals)    Historical Provider, MD   oxyCODONE-acetaminophen (PERCOCET) 7.5-325 MG per tablet Take 1 tablet by mouth every 6 hours as needed for Pain. Falguni Rodriguez     Historical Provider, MD   ondansetron (ZOFRAN) 4 MG tablet Take 1 tablet by mouth every 8 hours as needed for Nausea or Vomiting 9/23/18   Kadie Santizo MD   aspirin 81 MG chewable tablet Take 1 tablet by mouth daily 9/22/18   Kadie Santizo MD   metFORMIN (GLUCOPHAGE) 850 MG tablet Take 1 tablet by mouth 2 times daily (with meals) 9/21/18   Kadie Santizo MD   linagliptin (TRADJENTA) 5 MG tablet Take 1 tablet by mouth daily 9/22/18   Kadie Santizo MD   insulin glargine (BASAGLAR KWIKPEN) 100 UNIT/ML injection pen Inject 70 Units into the skin nightly 9/21/18   Kadie Santizo MD   Lancets MISC 1 each by Does not apply route daily 9/21/18   Kadie Santizo MD   Insulin Pen Needle 32G X 5 MM MISC 1 each by Does not apply route daily 9/21/18   Kadie Santizo MD   promethazine (PHENERGAN) 25 MG tablet Take 25 mg by mouth every 6 hours as needed for Nausea    Historical Provider, MD   tiZANidine (ZANAFLEX) 2 MG tablet Take 2 mg by mouth every 8 hours as needed     Historical Provider, MD   glucose monitoring kit (FREESTYLE) monitoring kit 1 each by Does not apply route once for 1 dose.  6/20/13 6/20/13  Padilla Paris MD      Infusions:   PRN Meds:   ondansetron 4 mg Q6H PRN         Electronically signed by Guillermo Richardson MD on 3/21/2019 at 4:29 AM

## 2019-03-21 NOTE — PROGRESS NOTES
Patient presented early morning to the emergency Department due to diabetic foot ulceration and pain control, we'll also consult his podiatrist Dr. Tonny Wren for evaluation and treatment. Patient states that his pain is controlled with medication at this time, we'll continue with IV fluids and IV antibiotics.

## 2019-03-21 NOTE — ED TRIAGE NOTES
Patient to the ED with cc dizziness, light headedness, patient also reports the right small toe is painful, patient states he is a diabetic

## 2019-03-21 NOTE — PROGRESS NOTES
Patient arrived to room 1111 via cart from ER. Dark area/ diabetic foot ulcer to right foot, 5th toe measuring 1.8cm long x 1.7cm wide and another to the bottom of the left foot measuring approx 5.0cm long x 2.5cm wide. Pt has #20 lt FA infusing without complications. C/o pain to right foot. Home medications verified with patient.

## 2019-03-21 NOTE — CONSULTS
Consults   [facility] [address] [place] [address] Note      Reason for Consult:  Right 5th toe ulceration. Requesting Physician:      CHIEF COMPLAINT:  5th toe with ulceration    HISTORY OF PRESENT ILLNESS:                The patient is a 37 y.o. male with significant past medical history of diabetes who presents with erythematous right 5th toe with an area of abrasion on the dorsal aspect of the toe. Patient relates the ulcer developed when he started wearing new shoes. Early this morning presented to the ED with hot swollen right 5th toe with a dorsal eschar. I have treated this patient the past and last year we indicated left 4th toe and metatarsal. He has had a chronic ulceration sub-2nd metatarsal phalangeal joint on the left foot. I have told him in my office and at the center. Principal Problem:    Diabetic foot infection ([address]  Resolved Problems:    * No resolved hospital problems. *       Past Medical History:   Diagnosis Date    Abscess 2010    scrotal    Anxiety associated with depression     Back pain 7/2/2012    Chest pain 5/1/2013    Diabetic nephropathy ([address]     Diabetic neuropathy ([address] 12/22/2011    Diabetic ulcer of left midfoot associated with type 2 diabetes mellitus, with fat layer exposed ([address] 7/18/2017    Diverticulosis     C scope + Dr. Jef Pascual DM (diabetes mellitus), type 2 ([address] 2002    DR. Turner podiatry    Hyperlipidemia LDL goal < 100 7/15/2013    Hypertension     Internal hemorrhoid     C scope + Dr. Surjit Felton fracture 1988    Panic attacks     Pericarditis 2003    Hospitalized with s/p heart cath normal    Peripheral autonomic neuropathy due to diabetes mellitus ([address]     Axonal EMG- [facility], March 2011    Sebaceous cyst 9/1/2011    URI (upper respiratory infection) 2/27/2012    WD-Wound, surgical, infected, initial encounter 12/8/2017    Wrist fracture 1986         Past Surgical History:   Procedure Laterality Date   [facility] CATHETERIZATION  2003, 2013    Normal (Dr. Lu Gravely)    COLONOSCOPY  6/2/2011    Pandiverticulosis, Nonbleeding internal hemorrhoids, Repeat colonoscopy at age 48- Dr. Claudio Guidry    \"had reconstruction surgery on nose a year after the other nose surgery\"    OTHER SURGICAL HISTORY Right 05/22/2015    I & D right great toe with partial amputation    OTHER SURGICAL HISTORY  12/04/2017    inc and drainage of abcess    IN DEEP INCIS FOOT BONE INFECTN Left 9/22/2018    LEFT FOOT DEBRIDEMENT INCISION AND DRAINAGE TOP AND BOTTOM performed by Jaiden Lai DPM at [place] [address] SKIN BIOPSY N/A [id number]    sebaceous cyst removal times 4     TOE AMPUTATION      right great toe    TONSILLECTOMY AND ADENOIDECTOMY  1988    UPPER GASTROINTESTINAL ENDOSCOPY  06/2/2011    Mild gastritis with moderatley severe antritis, small hiatal hernia, Dr. Destiny Mallory N/A 1/12/2019    EGD BIOPSY performed by Tish Casiano MD at [facility] ENDOSCOPY     Current Medications:   Scheduled Meds:   piperacillin-tazobactam  3.375 g Intravenous Q6H    aspirin  81 mg Oral Daily    baclofen  10 mg Oral BID    diclofenac  1 patch Transdermal BID    hydrALAZINE  50 mg Oral TID    lisinopril  2.5 mg Oral Daily    pantoprazole  40 mg Oral BID AC    sodium chloride flush  10 mL Intravenous 2 times per day    enoxaparin  40 mg Subcutaneous Daily    insulin lispro  0-6 Units Subcutaneous TID WC    insulin lispro  0-3 Units Subcutaneous Nightly    insulin glargine  40 Units Subcutaneous Nightly    vancomycin  1,500 mg Intravenous Q8H     Continuous Infusions:   dextrose      sodium chloride 100 mL/hr at 03/21/19 1411     PRN Meds:.ondansetron, sodium chloride flush, magnesium hydroxide, glucose, dextrose, glucagon (rDNA), dextrose, acetaminophen, oxyCODONE, morphine    Allergies:  Adhesive tape;  Doxycycline; and Reglan [metoclopramide]    Social History:    Social History     Socioeconomic History    Marital status:      Spouse name: Not on file    Number of children: Not on file    Years of education: Not on file    Highest education level: Not on file   Occupational History    Not on file   Social Needs    Financial resource strain: Not on file    Food insecurity:     Worry: Not on file     Inability: Not on file    Transportation needs:     Medical: Not on file     Non-medical: Not on file   Tobacco Use    Smoking status: Former Smoker     Years: 20.00     Last attempt to quit: 2017     Years since quittin.3    Smokeless tobacco: Never Used   Substance and Sexual Activity    Alcohol use: No    Drug use: Yes     Frequency: 7.0 times per week     Types: Marijuana    Sexual activity: Yes   Lifestyle    Physical activity:     Days per week: Not on file     Minutes per session: Not on file    Stress: Not on file   Relationships    Social connections:     Talks on phone: Not on file     Gets together: Not on file     Attends Religion service: Not on file     Active member of club or organization: Not on file     Attends meetings of clubs or organizations: Not on file     Relationship status: Not on file    Intimate partner violence:     Fear of current or ex partner: Not on file     Emotionally abused: Not on file     Physically abused: Not on file     Forced sexual activity: Not on file   Other Topics Concern    Not on file   Social History Narrative    Not on file     Family History:   Family History   Problem Relation Age of Onset    Cancer Mother         ?site    Stroke Mother     Bleeding Prob Mother     Diabetes Father     Heart Disease Father     High Blood Pressure Father     Obesity Father     Kidney Disease Father     Diabetes Sister     High Blood Pressure Sister     Mental Illness Sister     Obesity Sister     High Blood Pressure Other     Mental Illness Other         bipolar    Other Son         cyst in ear canal    ADHD Daughter      XR FOOT RIGHT (MIN 3 VIEWS) [[id number]] Collected: 03/21/19 0352   Updated: 03/21/19 0402    Narrative:     EXAMINATION:  3 XRAY VIEWS OF THE RIGHT FOOT; 3 XRAY VIEWS OF THE LEFT FOOT    3/21/2019 3:46 am; 3/21/2019 3:47 am    COMPARISON:  Left foot radiograph 01/07/2019, right foot radiograph 05/21/2015    HISTORY:  ORDERING SYSTEM PROVIDED HISTORY: toe pain  TECHNOLOGIST PROVIDED HISTORY:  Reason for exam:->toe pain  Ordering Physician Provided Reason for Exam: toe pain  Acuity: Acute  Type of Exam: Initial  Mechanism of Injury: toe pain  Relevant Medical/Surgical History: toe pain;    ORDERING SYSTEM PROVIDED HISTORY: ulcer to bottom of foot  TECHNOLOGIST PROVIDED HISTORY:  Reason for exam:->ulcer to bottom of foot  Ordering Physician Provided Reason for Exam: ulcer to bottom of foot  Acuity: Acute  Type of Exam: Initial  Mechanism of Injury: ulcer to bottom of foot  Relevant Medical/Surgical History: ulcer to bottom of foot    FINDINGS:  RIGHT FOOT:  New irregularities involving the proximal 2nd through 5th  metatarsals as well as the cuneiforms, including associated joint space loss  and erosions.  Associated chronic appearing fracture in the proximal shaft of  the 5th metatarsal.  Increased osteolysis of the 1st distal phalanx with only  the base remaining.  Joints maintain anatomic alignment.  Calcaneal  enthesophytes at the insertion of the Achilles tendon and origin of the  plantar aponeurosis.  Soft tissue swelling predominantly in the forefoot. 0.2 cm radiopaque foreign body in the plantar forefoot at the level of the  metatarsophalangeal joints.  No soft tissue gas nor evident skin defect. LEFT FOOT:  Amputation of the 4th toe at the 4th metacarpophalangeal joint. No acute fractures nor areas of obvious abnormal cortical disruption.   Unchanged findings of periostitis associated with the 3rd and 4th  metatarsals.  Remnant joints maintain anatomic alignment.  Calcaneal  enthesophytes at the insertion of the like give him another day to see if there is any recovery and the depth of the necrotic tissue. 2. Antibiotics should be continued. 3. Patient ultimately will need custom diabetic orthotic control of his foot to prevent collapse of the midfoot secondary to Charcot deformity. 4. I will reevaluate this patient tomorrow and discuss again, possible amputation of the 5th toe.   Thank you for your consultation request. Sincerely BENNY Landin DPM

## 2019-03-21 NOTE — CARE COORDINATION
Chart screened. Patient is from home with spouse; independent prior to admission. He has had issues requiring a Wound Vac but no longer uses vac. He has a PCP and reliable transportation. He has insurance that assist with Rx when needed. Discussed concerns; awaiting Dr Kimberlee Caldwell recommendations. CM will follow for needs.  Will Serra RN

## 2019-03-21 NOTE — ED PROVIDER NOTES
scope + Dr. Germaine Ross Panic attacks     Pericarditis 2003    Hospitalized with s/p heart cath normal    Peripheral autonomic neuropathy due to diabetes mellitus ([facility] [address]     Axonal EMG- NCS, March 2011    Sebaceous cyst 9/1/2011    URI (upper respiratory infection) 2/27/2012    WD-Wound, surgical, infected, initial encounter 12/8/2017    Wrist fracture 1986       SURGICAL HISTORY    Past Surgical History:   Procedure Laterality Date    ABDOMEN SURGERY      CARDIAC CATHETERIZATION  2003, 2013    Normal (Dr. Hao Abreu)    COLONOSCOPY  6/2/2011    Pandiverticulosis, Nonbleeding internal hemorrhoids, Repeat colonoscopy at age 48- Dr. Shania Brice    \"had reconstruction surgery on nose a year after the other nose surgery\"    OTHER SURGICAL HISTORY Right 05/22/2015    I & D right great toe with partial amputation    OTHER SURGICAL HISTORY  12/04/2017    inc and drainage of abcess    NV DEEP INCIS FOOT BONE INFECTN Left 9/22/2018    LEFT FOOT DEBRIDEMENT INCISION AND DRAINAGE TOP AND BOTTOM performed by Rosendo Vaughan DPM at [address] [facility] N/A [id number]    sebaceous cyst removal times 4     TOE AMPUTATION      right great toe    TONSILLECTOMY AND ADENOIDECTOMY  1988    UPPER GASTROINTESTINAL ENDOSCOPY  06/2/2011    Mild gastritis with moderatley severe antritis, small hiatal hernia,  [address] N/A 1/12/2019    EGD BIOPSY performed by Reynaldo Dukes MD at [address] [place] [address]    Current Outpatient Rx   Medication Sig Dispense Refill    baclofen (LIORESAL) 10 MG tablet Take 1 tablet by mouth 2 times daily 30 tablet 0    diclofenac (FLECTOR) 1.3 % patch Place 1 patch onto the skin 2 times daily 60 patch 0    pantoprazole (PROTONIX) 40 MG tablet Take 1 tablet by mouth 2 times daily 30 tablet 3    hydrALAZINE (APRESOLINE) 50 MG tablet Take 50 mg by mouth 3 times daily      insulin lispro (HUMALOG) 100 UNIT/ML injection vial Inject 30 Units into the skin 3 times daily (before meals)      lidocaine (LIDODERM) 5 % Place 1 patch onto the skin daily 12 hours on, 12 hours off.  lisinopril (PRINIVIL;ZESTRIL) 2.5 MG tablet Take 2.5 mg by mouth daily      naproxen (NAPROSYN) 500 MG tablet Take 500 mg by mouth 2 times daily (with meals)      oxyCODONE-acetaminophen (PERCOCET) 7.5-325 MG per tablet Take 1 tablet by mouth every 6 hours as needed for Pain. .      ondansetron (ZOFRAN) 4 MG tablet Take 1 tablet by mouth every 8 hours as needed for Nausea or Vomiting 20 tablet 0    aspirin 81 MG chewable tablet Take 1 tablet by mouth daily 30 tablet 3    metFORMIN (GLUCOPHAGE) 850 MG tablet Take 1 tablet by mouth 2 times daily (with meals) 60 tablet 3    linagliptin (TRADJENTA) 5 MG tablet Take 1 tablet by mouth daily 30 tablet 1    insulin glargine (BASAGLAR KWIKPEN) 100 UNIT/ML injection pen Inject 70 Units into the skin nightly 5 pen 3    Lancets MISC 1 each by Does not apply route daily 100 each 3    Insulin Pen Needle 32G X 5 MM MISC 1 each by Does not apply route daily 100 each 3    promethazine (PHENERGAN) 25 MG tablet Take 25 mg by mouth every 6 hours as needed for Nausea      glucose monitoring kit (FREESTYLE) monitoring kit 1 each by Does not apply route once for 1 dose.  1 kit 0       ALLERGIES    Allergies   Allergen Reactions    Adhesive Tape Rash    Doxycycline Nausea And Vomiting    Reglan [Metoclopramide] Anxiety       FAMILY HISTORY    Family History   Problem Relation Age of Onset    Cancer Mother         ?site   [facility] Stroke Mother     Bleeding Prob Mother     Diabetes Father     Heart Disease Father     High Blood Pressure Father     Obesity Father     Kidney Disease Father     Diabetes Sister     High Blood Pressure Sister     Mental Illness Sister     Obesity Sister     High Blood Pressure Other     Mental Illness Other         bipolar    Other Son         cyst in ear canal    ADHD Daughter        SOCIAL HISTORY    Social History     Socioeconomic History    Marital status:      Spouse name: None    Number of children: None    Years of education: None    Highest education level: None   Occupational History    None   Social Needs    Financial resource strain: None    Food insecurity:     Worry: None     Inability: None    Transportation needs:     Medical: None     Non-medical: None   Tobacco Use    Smoking status: Former Smoker     Years: 20.00     Last attempt to quit: 2017     Years since quittin.3    Smokeless tobacco: Never Used   Substance and Sexual Activity    Alcohol use: No    Drug use: Yes     Frequency: 7.0 times per week     Types: Marijuana    Sexual activity: Yes   Lifestyle    Physical activity:     Days per week: None     Minutes per session: None    Stress: None   Relationships    Social connections:     Talks on phone: None     Gets together: None     Attends Yazdanism service: None     Active member of club or organization: None     Attends meetings of clubs or organizations: None     Relationship status: None    Intimate partner violence:     Fear of current or ex partner: None     Emotionally abused: None     Physically abused: None     Forced sexual activity: None   Other Topics Concern    None   Social History Narrative    None       PHYSICAL EXAM    VITAL SIGNS: /81   Pulse 82   Temp 97.9 °F (36.6 °C) (Oral)   Resp 14   Ht 5' 9\" (1.753 m)   Wt 260 lb (117.9 kg)   SpO2 98%   BMI 38.40 kg/m²   Constitutional:  Well developed, well nourished, no acute distress, non-toxic appearance   HENT:  NC/AT. Ears, nose, mouth normal.  Respiratory:  Normal respiratory effort. Lungs CTAB. Cardiovascular:  RRR. GI:  Soft, non-distended. Bowel sounds active. Musculoskeletal:  Trace edema of bilateral LE, no tenderness. Integument:  Necrotic ulcer to the dorsal right 5th toe with surrounding erythema and warmth.   There is also an ulcer to the bottom of the left foot. Prior left 4th toe amputation.       RADIOLOGY/PROCEDURES    Labs Reviewed   CBC WITH AUTO DIFFERENTIAL - Abnormal; Notable for the following components:       Result Value    RBC 3.99 (*)     Hemoglobin 12.5 (*)     Hematocrit 36.6 (*)     MCH 31.3 (*)     MPV 11.2 (*)     Segs Relative 66.1 (*)     Lymphocytes % 21.0 (*)     Monocytes % 9.9 (*)     Immature Neutrophil % 0.5 (*)     All other components within normal limits   COMPREHENSIVE METABOLIC PANEL - Abnormal; Notable for the following components:    Sodium 133 (*)     BUN 29 (*)     Glucose 305 (*)     Calcium 7.9 (*)     Alb 3.1 (*)     All other components within normal limits   LACTIC ACID, PLASMA - Abnormal; Notable for the following components:    Lactate   (*)     Value: 2.1  LACT CALLED TO GRISEL JOSEPH IN ED AT [address] MLS  RESULTS READ BACK      All other components within normal limits   POCT GLUCOSE - Abnormal; Notable for the following components:    POC Glucose 351 (*)     All other components within normal limits   POCT GLUCOSE - Normal   TROPONIN   LACTIC ACID, PLASMA     Xr Foot Left (min 3 Views)    Result Date: 3/21/2019  EXAMINATION: 3 XRAY VIEWS OF THE RIGHT FOOT; 3 XRAY VIEWS OF THE LEFT FOOT 3/21/2019 3:46 am; 3/21/2019 3:47 am COMPARISON: Left foot radiograph 01/07/2019, right foot radiograph 05/21/2015 HISTORY: ORDERING SYSTEM PROVIDED HISTORY: toe pain TECHNOLOGIST PROVIDED HISTORY: Reason for exam:->toe pain Ordering Physician Provided Reason for Exam: toe pain Acuity: Acute Type of Exam: Initial Mechanism of Injury: toe pain Relevant Medical/Surgical History: toe pain; ORDERING SYSTEM PROVIDED HISTORY: ulcer to bottom of foot TECHNOLOGIST PROVIDED HISTORY: Reason for exam:->ulcer to bottom of foot Ordering Physician Provided Reason for Exam: ulcer to bottom of foot Acuity: Acute Type of Exam: Initial Mechanism of Injury: ulcer to bottom of foot Relevant Medical/Surgical History: ulcer to bottom of foot FINDINGS: RIGHT FOOT:  New irregularities involving the proximal 2nd through 5th metatarsals as well as the cuneiforms, including associated joint space loss and erosions. Associated chronic appearing fracture in the proximal shaft of the 5th metatarsal.  Increased osteolysis of the 1st distal phalanx with only the base remaining. Joints maintain anatomic alignment. Calcaneal enthesophytes at the insertion of the Achilles tendon and origin of the plantar aponeurosis. Soft tissue swelling predominantly in the forefoot. 0.2 cm radiopaque foreign body in the plantar forefoot at the level of the metatarsophalangeal joints. No soft tissue gas nor evident skin defect. LEFT FOOT:  Amputation of the 4th toe at the 4th metacarpophalangeal joint. No acute fractures nor areas of obvious abnormal cortical disruption. Unchanged findings of periostitis associated with the 3rd and 4th metatarsals. Remnant joints maintain anatomic alignment. Calcaneal enthesophytes at the insertion of the Achilles tendon and origin of the plantar aponeurosis. Suspected ulceration in the plantar forefoot at the level of the metatarsophalangeal joints. Soft tissue swelling predominantly in the forefoot. No soft tissue gas nor radiopaque foreign body. 1. Ulceration in the plantar left forefoot with no definite findings of underlying osteomyelitis or necrotizing fasciitis. Soft tissue swelling in the left forefoot likely represent cellulitis. 2. Soft tissue swelling in the right forefoot also likely represents cellulitis. No findings of underlying necrotizing fasciitis. 0.2 cm radiopaque foreign body in the plantar right forefoot. 3. Extensive new destructive changes centered about the right 2nd through 5th tarsometatarsal joints, potentially related to chronic osteomyelitis or neuropathic arthropathy. Of note, there is a nonunited fracture of the proximal right 5th metatarsal shaft.  4. Increased osteolysis of the right 1st distal phalanx. Xr Foot Right (min 3 Views)    Result Date: 3/21/2019  EXAMINATION: 3 XRAY VIEWS OF THE RIGHT FOOT; 3 XRAY VIEWS OF THE LEFT FOOT 3/21/2019 3:46 am; 3/21/2019 3:47 am COMPARISON: Left foot radiograph 01/07/2019, right foot radiograph 05/21/2015 HISTORY: ORDERING SYSTEM PROVIDED HISTORY: toe pain TECHNOLOGIST PROVIDED HISTORY: Reason for exam:->toe pain Ordering Physician Provided Reason for Exam: toe pain Acuity: Acute Type of Exam: Initial Mechanism of Injury: toe pain Relevant Medical/Surgical History: toe pain; ORDERING SYSTEM PROVIDED HISTORY: ulcer to bottom of foot TECHNOLOGIST PROVIDED HISTORY: Reason for exam:->ulcer to bottom of foot Ordering Physician Provided Reason for Exam: ulcer to bottom of foot Acuity: Acute Type of Exam: Initial Mechanism of Injury: ulcer to bottom of foot Relevant Medical/Surgical History: ulcer to bottom of foot FINDINGS: RIGHT FOOT:  New irregularities involving the proximal 2nd through 5th metatarsals as well as the cuneiforms, including associated joint space loss and erosions. Associated chronic appearing fracture in the proximal shaft of the 5th metatarsal.  Increased osteolysis of the 1st distal phalanx with only the base remaining. Joints maintain anatomic alignment. Calcaneal enthesophytes at the insertion of the Achilles tendon and origin of the plantar aponeurosis. Soft tissue swelling predominantly in the forefoot. 0.2 cm radiopaque foreign body in the plantar forefoot at the level of the metatarsophalangeal joints. No soft tissue gas nor evident skin defect. LEFT FOOT:  Amputation of the 4th toe at the 4th metacarpophalangeal joint. No acute fractures nor areas of obvious abnormal cortical disruption. Unchanged findings of periostitis associated with the 3rd and 4th metatarsals. Remnant joints maintain anatomic alignment. Calcaneal enthesophytes at the insertion of the Achilles tendon and origin of the plantar aponeurosis.   Suspected Bilat leg pain and swelling Acuity: Acute Type of Exam: Subsequent/Follow-up Additional signs and symptoms: Previous in January negative FINDINGS: The visualized veins of the bilateral lower extremities are patent and free of echogenic thrombus. The veins are normally compressible and have normal phasic flow. No evidence of DVT in either lower extremity. ED COURSE & MEDICAL DECISION MAKING    Pertinent Labs & Imaging studies reviewed. (See chart for details)  -  Patient seen and evaluated in the emergency department. -  Triage and nursing notes reviewed and incorporated. -  Old chart records reviewed and incorporated. -  Supervising physician was Dr. Durga Yanez. He saw and examined patient. -  Differential diagnosis includes: abscess, cellulitis, lipoma, folliculitis, and others  -  ED medications:  Vanc, Zosyn, NS, morphine, Zofran  -  Labs unremarkable, hyperglycemia of 351, lactic 2.1. No DVT on US. There is evidence of extensive new destructive changes to the right 2nd-5th tarsometatarsal joints. I spoke with Dr. Miguelina Pisano, hospitalist, who will admit for IV ABX and podiatry eval.      FINAL IMPRESSION    1. Cellulitis of lower extremity, unspecified laterality    2.  Diabetic foot infection [facility])                  Cristal Jose PA-C  03/21/19 [id number]

## 2019-03-21 NOTE — ED NOTES
Pt. Has right 5th toe pain. Pt. States he tends to have neuropathy so he doesn't feel the toe pain. Pt. Is taking percocet's for pain per Dr. Levi Later. Pt. States he didn't take pain meds before arrival but doesn't need a prescription for pain medication due to his supply at home. Pt. 5th toe is black. Pt. Also has another diabetic black wound on bottom of left foot.       Azalia Hancock RN  03/21/19 2410

## 2019-03-21 NOTE — PROGRESS NOTES
Dr. Antoinette Tyson arrived for consult but patient states he see Dr. Jean-Claude Lr and prefers to be seen by him on this admission. Dr. Jean-Claude Lr notified via [address] [place] [address].

## 2019-03-22 ENCOUNTER — ANESTHESIA (OUTPATIENT)
Dept: OPERATING ROOM | Age: 44
DRG: 314 | End: 2019-03-22
Payer: COMMERCIAL

## 2019-03-22 ENCOUNTER — ANESTHESIA EVENT (OUTPATIENT)
Dept: OPERATING ROOM | Age: 44
DRG: 314 | End: 2019-03-22
Payer: COMMERCIAL

## 2019-03-22 LAB
ANION GAP SERPL CALCULATED.3IONS-SCNC: 8 MMOL/L (ref 4–16)
APTT: 30.8 SECONDS (ref 21.2–33)
BASOPHILS ABSOLUTE: 0 K/CU MM
BASOPHILS RELATIVE PERCENT: 0.4 % (ref 0–1)
BUN BLDV-MCNC: 13 MG/DL (ref 6–23)
CALCIUM SERPL-MCNC: 7.6 MG/DL (ref 8.3–10.6)
CHLORIDE BLD-SCNC: 104 MMOL/L (ref 99–110)
CO2: 24 MMOL/L (ref 21–32)
CREAT SERPL-MCNC: 0.9 MG/DL (ref 0.9–1.3)
DIFFERENTIAL TYPE: ABNORMAL
DOSE AMOUNT: NORMAL
DOSE TIME: NORMAL
EOSINOPHILS ABSOLUTE: 0.2 K/CU MM
EOSINOPHILS RELATIVE PERCENT: 3 % (ref 0–3)
GFR AFRICAN AMERICAN: >60 ML/MIN/1.73M2
GFR NON-AFRICAN AMERICAN: >60 ML/MIN/1.73M2
GLUCOSE BLD-MCNC: 156 MG/DL (ref 70–99)
GLUCOSE BLD-MCNC: 228 MG/DL (ref 70–99)
GLUCOSE BLD-MCNC: 251 MG/DL (ref 70–99)
GLUCOSE BLD-MCNC: 265 MG/DL (ref 70–99)
GLUCOSE BLD-MCNC: 281 MG/DL (ref 70–99)
GLUCOSE BLD-MCNC: 334 MG/DL (ref 70–99)
HCT VFR BLD CALC: 36.3 % (ref 42–52)
HEMOGLOBIN: 12.3 GM/DL (ref 13.5–18)
IMMATURE NEUTROPHIL %: 0.4 % (ref 0–0.43)
INR BLD: 1.02 INDEX
LYMPHOCYTES ABSOLUTE: 0.7 K/CU MM
LYMPHOCYTES RELATIVE PERCENT: 12.3 % (ref 24–44)
MCH RBC QN AUTO: 30.8 PG (ref 27–31)
MCHC RBC AUTO-ENTMCNC: 33.9 % (ref 32–36)
MCV RBC AUTO: 91 FL (ref 78–100)
MONOCYTES ABSOLUTE: 0.5 K/CU MM
MONOCYTES RELATIVE PERCENT: 8.9 % (ref 0–4)
NUCLEATED RBC %: 0 %
PDW BLD-RTO: 12.1 % (ref 11.7–14.9)
PLATELET # BLD: 176 K/CU MM (ref 140–440)
PMV BLD AUTO: 10.5 FL (ref 7.5–11.1)
POTASSIUM SERPL-SCNC: 4 MMOL/L (ref 3.5–5.1)
PROTHROMBIN TIME: 11.8 SECONDS (ref 9.12–12.5)
RBC # BLD: 3.99 M/CU MM (ref 4.6–6.2)
SEGMENTED NEUTROPHILS ABSOLUTE COUNT: 4.2 K/CU MM
SEGMENTED NEUTROPHILS RELATIVE PERCENT: 75 % (ref 36–66)
SODIUM BLD-SCNC: 136 MMOL/L (ref 135–145)
TOTAL IMMATURE NEUTOROPHIL: 0.02 K/CU MM
TOTAL NUCLEATED RBC: 0 K/CU MM
VANCOMYCIN TROUGH: 16.6 UG/ML (ref 10–20)
WBC # BLD: 5.6 K/CU MM (ref 4–10.5)

## 2019-03-22 PROCEDURE — 36415 COLL VENOUS BLD VENIPUNCTURE: CPT

## 2019-03-22 PROCEDURE — 6360000002 HC RX W HCPCS: Performed by: HOSPITALIST

## 2019-03-22 PROCEDURE — 85730 THROMBOPLASTIN TIME PARTIAL: CPT

## 2019-03-22 PROCEDURE — 6370000000 HC RX 637 (ALT 250 FOR IP): Performed by: HOSPITALIST

## 2019-03-22 PROCEDURE — 6370000000 HC RX 637 (ALT 250 FOR IP): Performed by: INTERNAL MEDICINE

## 2019-03-22 PROCEDURE — 80048 BASIC METABOLIC PNL TOTAL CA: CPT

## 2019-03-22 PROCEDURE — 82962 GLUCOSE BLOOD TEST: CPT

## 2019-03-22 PROCEDURE — 80202 ASSAY OF VANCOMYCIN: CPT

## 2019-03-22 PROCEDURE — 94761 N-INVAS EAR/PLS OXIMETRY MLT: CPT

## 2019-03-22 PROCEDURE — 85610 PROTHROMBIN TIME: CPT

## 2019-03-22 PROCEDURE — 85025 COMPLETE CBC W/AUTO DIFF WBC: CPT

## 2019-03-22 PROCEDURE — 6360000002 HC RX W HCPCS: Performed by: NURSE PRACTITIONER

## 2019-03-22 PROCEDURE — 2580000003 HC RX 258: Performed by: HOSPITALIST

## 2019-03-22 PROCEDURE — 1200000000 HC SEMI PRIVATE

## 2019-03-22 RX ORDER — INSULIN GLARGINE 100 [IU]/ML
20 INJECTION, SOLUTION SUBCUTANEOUS NIGHTLY
Status: DISCONTINUED | OUTPATIENT
Start: 2019-03-22 | End: 2019-03-23

## 2019-03-22 RX ORDER — MORPHINE SULFATE 4 MG/ML
2 INJECTION, SOLUTION INTRAMUSCULAR; INTRAVENOUS EVERY 4 HOURS PRN
Status: DISCONTINUED | OUTPATIENT
Start: 2019-03-22 | End: 2019-03-23 | Stop reason: SDUPTHER

## 2019-03-22 RX ADMIN — INSULIN LISPRO 30 UNITS: 100 INJECTION, SOLUTION INTRAVENOUS; SUBCUTANEOUS at 18:44

## 2019-03-22 RX ADMIN — LISINOPRIL 2.5 MG: 2.5 TABLET ORAL at 09:39

## 2019-03-22 RX ADMIN — SODIUM CHLORIDE, PRESERVATIVE FREE 10 ML: 5 INJECTION INTRAVENOUS at 20:20

## 2019-03-22 RX ADMIN — OXYCODONE HYDROCHLORIDE 7.5 MG: 5 TABLET ORAL at 16:22

## 2019-03-22 RX ADMIN — DICLOFENAC EPOLAMINE 1 PATCH: 0.01 PATCH TOPICAL at 01:10

## 2019-03-22 RX ADMIN — INSULIN LISPRO 4 UNITS: 100 INJECTION, SOLUTION INTRAVENOUS; SUBCUTANEOUS at 09:41

## 2019-03-22 RX ADMIN — MORPHINE SULFATE 2 MG: 4 INJECTION INTRAVENOUS at 12:26

## 2019-03-22 RX ADMIN — DICLOFENAC EPOLAMINE 1 PATCH: 0.01 PATCH TOPICAL at 09:48

## 2019-03-22 RX ADMIN — TAZOBACTAM SODIUM AND PIPERACILLIN SODIUM 3.38 G: 375; 3 INJECTION, SOLUTION INTRAVENOUS at 15:24

## 2019-03-22 RX ADMIN — MORPHINE SULFATE 2 MG: 4 INJECTION INTRAVENOUS at 20:20

## 2019-03-22 RX ADMIN — MORPHINE SULFATE 2 MG: 4 INJECTION INTRAVENOUS at 03:47

## 2019-03-22 RX ADMIN — TAZOBACTAM SODIUM AND PIPERACILLIN SODIUM 3.38 G: 375; 3 INJECTION, SOLUTION INTRAVENOUS at 09:48

## 2019-03-22 RX ADMIN — PANTOPRAZOLE SODIUM 40 MG: 40 TABLET, DELAYED RELEASE ORAL at 07:05

## 2019-03-22 RX ADMIN — OXYCODONE HYDROCHLORIDE 7.5 MG: 5 TABLET ORAL at 07:05

## 2019-03-22 RX ADMIN — TAZOBACTAM SODIUM AND PIPERACILLIN SODIUM 3.38 G: 375; 3 INJECTION, SOLUTION INTRAVENOUS at 03:47

## 2019-03-22 RX ADMIN — INSULIN LISPRO 6 UNITS: 100 INJECTION, SOLUTION INTRAVENOUS; SUBCUTANEOUS at 14:20

## 2019-03-22 RX ADMIN — BACLOFEN 10 MG: 10 TABLET ORAL at 09:40

## 2019-03-22 RX ADMIN — ASPIRIN 81 MG 81 MG: 81 TABLET ORAL at 09:40

## 2019-03-22 RX ADMIN — INSULIN LISPRO 30 UNITS: 100 INJECTION, SOLUTION INTRAVENOUS; SUBCUTANEOUS at 14:22

## 2019-03-22 RX ADMIN — OXYCODONE HYDROCHLORIDE 7.5 MG: 5 TABLET ORAL at 23:14

## 2019-03-22 RX ADMIN — HYDRALAZINE HYDROCHLORIDE 50 MG: 50 TABLET ORAL at 07:05

## 2019-03-22 RX ADMIN — SODIUM CHLORIDE: 9 INJECTION, SOLUTION INTRAVENOUS at 14:50

## 2019-03-22 RX ADMIN — HYDRALAZINE HYDROCHLORIDE 50 MG: 50 TABLET ORAL at 22:57

## 2019-03-22 RX ADMIN — INSULIN GLARGINE 20 UNITS: 100 INJECTION, SOLUTION SUBCUTANEOUS at 23:18

## 2019-03-22 RX ADMIN — VANCOMYCIN HYDROCHLORIDE 1500 MG: 5 INJECTION, POWDER, LYOPHILIZED, FOR SOLUTION INTRAVENOUS at 07:05

## 2019-03-22 RX ADMIN — PANTOPRAZOLE SODIUM 40 MG: 40 TABLET, DELAYED RELEASE ORAL at 16:22

## 2019-03-22 RX ADMIN — INSULIN LISPRO 2 UNITS: 100 INJECTION, SOLUTION INTRAVENOUS; SUBCUTANEOUS at 22:55

## 2019-03-22 RX ADMIN — DICLOFENAC EPOLAMINE 1 PATCH: 0.01 PATCH TOPICAL at 20:20

## 2019-03-22 RX ADMIN — TAZOBACTAM SODIUM AND PIPERACILLIN SODIUM 3.38 G: 375; 3 INJECTION, SOLUTION INTRAVENOUS at 23:21

## 2019-03-22 RX ADMIN — BACLOFEN 10 MG: 10 TABLET ORAL at 20:20

## 2019-03-22 RX ADMIN — HYDRALAZINE HYDROCHLORIDE 50 MG: 50 TABLET ORAL at 14:02

## 2019-03-22 RX ADMIN — INSULIN LISPRO 12 UNITS: 100 INJECTION, SOLUTION INTRAVENOUS; SUBCUTANEOUS at 18:41

## 2019-03-22 RX ADMIN — ENOXAPARIN SODIUM 40 MG: 40 INJECTION SUBCUTANEOUS at 09:39

## 2019-03-22 RX ADMIN — VANCOMYCIN HYDROCHLORIDE 1500 MG: 5 INJECTION, POWDER, LYOPHILIZED, FOR SOLUTION INTRAVENOUS at 16:22

## 2019-03-22 ASSESSMENT — PAIN SCALES - GENERAL
PAINLEVEL_OUTOF10: 7
PAINLEVEL_OUTOF10: 8
PAINLEVEL_OUTOF10: 7

## 2019-03-22 ASSESSMENT — PAIN DESCRIPTION - PROGRESSION: CLINICAL_PROGRESSION: GRADUALLY WORSENING

## 2019-03-22 ASSESSMENT — PAIN DESCRIPTION - ORIENTATION: ORIENTATION: RIGHT

## 2019-03-22 ASSESSMENT — PAIN DESCRIPTION - PAIN TYPE: TYPE: ACUTE PAIN

## 2019-03-22 ASSESSMENT — PAIN DESCRIPTION - DESCRIPTORS: DESCRIPTORS: BURNING;PINS AND NEEDLES

## 2019-03-22 ASSESSMENT — PAIN DESCRIPTION - ONSET: ONSET: ON-GOING

## 2019-03-22 ASSESSMENT — PAIN DESCRIPTION - LOCATION: LOCATION: FOOT

## 2019-03-22 ASSESSMENT — PAIN DESCRIPTION - FREQUENCY: FREQUENCY: CONTINUOUS

## 2019-03-22 NOTE — PROGRESS NOTES
[address] [facility]  consulted by Dr. Sony Nair for monitoring and adjustment. Indication for treatment: Diabetic Foot Infection  Goal trough: 15-20 mcg/mL     Pertinent Laboratory Values:   Temp Readings from Last 3 Encounters:   03/22/19 97.9 °F (36.6 °C) (Oral)   01/12/19 98 °F (36.7 °C) (Oral)   01/09/19 98 °F (36.7 °C) (Oral)     Recent Labs     03/21/19  0300 03/21/19  0917 03/22/19  0825   WBC 9.7  --  5.6   LACTATE 2.1  LACT CALLED TO GRISEL JOSEPH IN ED AT 0[address], [facility] MLS  RESULTS READ BACK  * 2.2*  --      Recent Labs     03/21/19  0300 03/22/19  0825   BUN 29* 13   CREATININE 1.2 0.9     Estimated Creatinine Clearance: 137 mL/min (based on SCr of 0.9 mg/dL). Intake/Output Summary (Last 24 hours) at 3/22/2019 1517  Last data filed at 3/22/2019 1444  Gross per 24 hour   Intake 3736.67 ml   Output 3750 ml   Net -13.33 ml       Pertinent Cultures:  Date    Source    Results  3/21   C. Diff    Ordered    Vancomycin level:   TROUGH:    Recent Labs     03/22/19  1401   VANCOTROUGH 16.6     RANDOM:  No results for input(s): VANCORANDOM in the last 72 hours. Assessment:  WBC and temperature: WBC WNL. Patient is afebrile. SCr, BUN, and urine output: Improving, Scr 1.2 -> 0.9  Day(s) of therapy: #2  Vancomycin level: 16.6 mcg/mL - therapeutic    Plan:  Vancomycin trough level was ordered for this AM but drawn after dose administered. A level was rescheduled for this afternoon and is found to be therapeutic. Renal function is stable at this time. Plan to continue current vancomycin dosing: vancomycin 1500 mg IVPB every 8 hours. Given concurrent Zosyn therapy and hx diabetes, renal function will be followed closely. Plan for next/repeat trough level: 03-24-19 @ 0630. Pharmacy will continue to monitor patient and adjust therapy as indicated.     Thank you for the consult,    Jean Claude Ivory, PharmD, [facility]

## 2019-03-22 NOTE — PROGRESS NOTES
Nutrition Assessment    Type and Reason for Visit: Initial, Positive Nutrition Screen(wound )    Nutrition Recommendations: Continue carb controlled diet    Offer wound healing oral nutrition supplement after procedure     Nutrition Assessment: Patient currently moderate nutrition risk with increased nutrient needs related to increased demand for nutrients with foot wound. Also with hx DM, patient reports testing glucose and taking insulin as directed at home but recently more elevated with pain and infection. Patient with no concerns/questions regarding carb controlled diet at this time. Malnutrition Assessment:  · Malnutrition Status: No malnutrition  · Context: Acute illness or injury    Nutrition Risk Level:  Moderate    Nutrient Needs:  · Estimated Daily Total Kcal: 5295-7729 (15-18 dev/kg based on actual weight with BMI 40)  · Estimated Daily Protein (g):  (1.2-1.5 g/kg IBW)  · Estimated Daily Total Fluid (ml/day): 2372-1684 ( 1ml/dev)    Nutrition Diagnosis:   · Problem: Increased nutrient needs  · Etiology: related to Increased demand for energy/nutrients, Endocrine dysfunction     Signs and symptoms:  as evidenced by Presence of wounds    Objective Information:  · Nutrition-Focused Physical Findings: sitting up eating breakfast meals, appears nourished   · Wound Type: Diabetic Ulcer(toe and foot ulcer)  · Current Nutrition Therapies:  · Oral Diet Orders: Carb Control 4 Carbs/Meal   · Oral Diet intake: (limited po data, eating breakfast )  · Oral Nutrition Supplement (ONS) Orders: None  · ONS intake:    · Anthropometric Measures:  · Ht: 5' 9\" (175.3 cm)   · Current Body Wt: 270 lb (122.5 kg)  · % Weight Change:  ,  no loss reported   · Ideal Body Wt: 160 lb (72.6 kg), % Ideal Body 168  · BMI Classification: BMI > or equal to 40.0 Obese Class III    Nutrition Interventions:   Continue current diet, Start ONS  Continued Inpatient Monitoring, Education Initiated    Nutrition Evaluation: · Evaluation: Goals set   · Goals: Patient will comply with carb controlled diet with meal intake at least 75%     · Monitoring: Meal Intake, Supplement Intake, Weight, Pertinent Labs, Patient/Family Education      Electronically signed by Lis Hudson RD, ERIC on 3/22/19 at 10:34 AM    Contact Number: [phone]

## 2019-03-22 NOTE — PLAN OF CARE
Nutrition Problem: Increased nutrient needs  Intervention: Food and/or Nutrient Delivery: Continue current diet, Start ONS  Nutritional Goals: Patient will comply with carb controlled diet with meal intake at least 75%

## 2019-03-22 NOTE — PROGRESS NOTES
HOSPITALIST PROGRESS NOTE  Date: 3/22/2019   Name: Amy Yap   MRN: [id number]   YOB: 1975      Subjective/Interval Hx:   Patient stated that his pain is controlled with medication, is awaiting to be evaluated by ENT to express the need for amputation either today or tomorrow via surgery    Objective:   Physical Exam:   /84   Pulse 77   Temp 97.9 °F (36.6 °C) (Oral)   Resp 18   Ht 5' 9\" (1.753 m)   Wt 270 lb (122.5 kg)   SpO2 98%   BMI 39.87 kg/m²   General: no acute distress, well nourished and well hydrated  HEENT: NCAT  Heart: S1S2 RRR  Lungs: Clear to ascultation bilaterally, respiratory effort normal  Abdomen: soft, NT/ND, positive bowel sounds  Extremities: no pitting edema, nontender ; necrosis to the right second toe and fifth toe  Neuro: patient is awake, alert and orientated times 3, no gross deficits  Skin: no rashes or ecchymosis        Meds:   Meds:    insulin lispro  0-18 Units Subcutaneous TID WC    insulin lispro  0-9 Units Subcutaneous Nightly    insulin lispro  30 Units Subcutaneous TID WC    piperacillin-tazobactam  3.375 g Intravenous Q6H    aspirin  81 mg Oral Daily    baclofen  10 mg Oral BID    diclofenac  1 patch Transdermal BID    hydrALAZINE  50 mg Oral TID    lisinopril  2.5 mg Oral Daily    pantoprazole  40 mg Oral BID AC    sodium chloride flush  10 mL Intravenous 2 times per day    enoxaparin  40 mg Subcutaneous Daily    insulin glargine  40 Units Subcutaneous Nightly    vancomycin  1,500 mg Intravenous Q8H      Infusions:    dextrose      sodium chloride 100 mL/hr at 03/21/19 1411     PRN Meds:   ondansetron 4 mg Q6H PRN   sodium chloride flush 10 mL PRN   magnesium hydroxide 30 mL Daily PRN   glucose 15 g PRN   dextrose 12.5 g PRN   glucagon (rDNA) 1 mg PRN   dextrose 100 mL/hr PRN   acetaminophen 650 mg Q4H PRN   oxyCODONE 7.5 mg Q6H PRN   morphine 2 mg Q4H PRN       Data/Labs:     Recent Labs     03/21/19  0300 03/22/19  9004 WBC 9.7 5.6   HGB 12.5* 12.3*   HCT 36.6* 36.3*    176      Recent Labs     03/21/19  0300 03/22/19  0825   * 136   K 4.1 4.0   CL 99 104   CO2 21 24   BUN 29* 13   CREATININE 1.2 0.9     Recent Labs     03/21/19  0300   AST 16   ALT 20   BILITOT 0.3   ALKPHOS 55     Recent Labs     03/22/19  0825   INR 1.02     Recent Labs     03/21/19  0300   TROPONINT <0.010       I/O last 3 completed shifts: In: 3230 [P.O.:240; I.V.:2235; IV Piggyback:700]  Out: 3600 [Urine:3600]    Intake/Output Summary (Last 24 hours) at 3/22/2019 1044  Last data filed at 3/22/2019 0725  Gross per 24 hour   Intake 3976.67 ml   Output 4050 ml   Net -73.33 ml        Assessment/Plan:    diabetic foot infection/Ulceration to Right 5th MTP and Left 2nd MTP  - concern about OM  - admit, IV Vanc, zosyn, consult podiatry, NPO  - pain control.  Doppler neg for DVT.  03/22/19- is followed by podiatry who plans on amputation if indicated today or tomorrow     > DM type 2 uncontrolled  - lantus, SSI, consult endo     > dizziness, dyspnea episodes  - BP and CXR WNL  - check echo, cardiac tele, IVF     Hypertension-lisinopril, hydralazine, monitor blood pressure      DVT Prophylaxis: lovenox  Diet: DIET CARB CONTROL;  Code Status: Full Code    Dispo: when stable    Electronically signed by Bianca Montaño MD on 3/22/2019 at 10:44 AM

## 2019-03-23 VITALS — DIASTOLIC BLOOD PRESSURE: 92 MMHG | SYSTOLIC BLOOD PRESSURE: 149 MMHG | OXYGEN SATURATION: 100 % | TEMPERATURE: 98.6 F

## 2019-03-23 LAB
CREAT SERPL-MCNC: 1.1 MG/DL (ref 0.9–1.3)
GFR AFRICAN AMERICAN: >60 ML/MIN/1.73M2
GFR NON-AFRICAN AMERICAN: >60 ML/MIN/1.73M2
GLUCOSE BLD-MCNC: 170 MG/DL (ref 70–99)
GLUCOSE BLD-MCNC: 214 MG/DL (ref 70–99)
GLUCOSE BLD-MCNC: 248 MG/DL (ref 70–99)
GLUCOSE BLD-MCNC: 249 MG/DL (ref 70–99)
GLUCOSE BLD-MCNC: 268 MG/DL (ref 70–99)

## 2019-03-23 PROCEDURE — 6370000000 HC RX 637 (ALT 250 FOR IP): Performed by: PODIATRIST

## 2019-03-23 PROCEDURE — 87147 CULTURE TYPE IMMUNOLOGIC: CPT

## 2019-03-23 PROCEDURE — 87073 CULTURE BACTERIA ANAEROBIC: CPT

## 2019-03-23 PROCEDURE — 2709999900 HC NON-CHARGEABLE SUPPLY: Performed by: PODIATRIST

## 2019-03-23 PROCEDURE — 3700000000 HC ANESTHESIA ATTENDED CARE: Performed by: PODIATRIST

## 2019-03-23 PROCEDURE — 36415 COLL VENOUS BLD VENIPUNCTURE: CPT

## 2019-03-23 PROCEDURE — 87205 SMEAR GRAM STAIN: CPT

## 2019-03-23 PROCEDURE — 6360000002 HC RX W HCPCS: Performed by: ANESTHESIOLOGY

## 2019-03-23 PROCEDURE — 6360000002 HC RX W HCPCS: Performed by: PODIATRIST

## 2019-03-23 PROCEDURE — 2500000003 HC RX 250 WO HCPCS: Performed by: PODIATRIST

## 2019-03-23 PROCEDURE — 3600000002 HC SURGERY LEVEL 2 BASE: Performed by: PODIATRIST

## 2019-03-23 PROCEDURE — 3700000001 HC ADD 15 MINUTES (ANESTHESIA): Performed by: PODIATRIST

## 2019-03-23 PROCEDURE — 94761 N-INVAS EAR/PLS OXIMETRY MLT: CPT

## 2019-03-23 PROCEDURE — 6370000000 HC RX 637 (ALT 250 FOR IP): Performed by: HOSPITALIST

## 2019-03-23 PROCEDURE — 1200000000 HC SEMI PRIVATE

## 2019-03-23 PROCEDURE — 6360000002 HC RX W HCPCS: Performed by: HOSPITALIST

## 2019-03-23 PROCEDURE — 88305 TISSUE EXAM BY PATHOLOGIST: CPT

## 2019-03-23 PROCEDURE — 3600000012 HC SURGERY LEVEL 2 ADDTL 15MIN: Performed by: PODIATRIST

## 2019-03-23 PROCEDURE — 2580000003 HC RX 258: Performed by: HOSPITALIST

## 2019-03-23 PROCEDURE — 87186 SC STD MICRODIL/AGAR DIL: CPT

## 2019-03-23 PROCEDURE — 82565 ASSAY OF CREATININE: CPT

## 2019-03-23 PROCEDURE — 2580000003 HC RX 258: Performed by: PODIATRIST

## 2019-03-23 PROCEDURE — 87071 CULTURE AEROBIC QUANT OTHER: CPT

## 2019-03-23 PROCEDURE — 6370000000 HC RX 637 (ALT 250 FOR IP): Performed by: INTERNAL MEDICINE

## 2019-03-23 PROCEDURE — 87076 CULTURE ANAEROBE IDENT EACH: CPT

## 2019-03-23 PROCEDURE — 6360000002 HC RX W HCPCS: Performed by: NURSE PRACTITIONER

## 2019-03-23 PROCEDURE — 0Y6X0Z1 DETACHMENT AT RIGHT 5TH TOE, HIGH, OPEN APPROACH: ICD-10-PCS | Performed by: PODIATRIST

## 2019-03-23 PROCEDURE — 82962 GLUCOSE BLOOD TEST: CPT

## 2019-03-23 PROCEDURE — 87077 CULTURE AEROBIC IDENTIFY: CPT

## 2019-03-23 RX ORDER — MIDAZOLAM HYDROCHLORIDE 1 MG/ML
2 INJECTION INTRAMUSCULAR; INTRAVENOUS ONCE
Status: COMPLETED | OUTPATIENT
Start: 2019-03-23 | End: 2019-03-23

## 2019-03-23 RX ORDER — ONDANSETRON 2 MG/ML
INJECTION INTRAMUSCULAR; INTRAVENOUS PRN
Status: DISCONTINUED | OUTPATIENT
Start: 2019-03-23 | End: 2019-03-23 | Stop reason: SDUPTHER

## 2019-03-23 RX ORDER — BUPIVACAINE HYDROCHLORIDE 2.5 MG/ML
INJECTION, SOLUTION EPIDURAL; INFILTRATION; INTRACAUDAL
Status: COMPLETED | OUTPATIENT
Start: 2019-03-23 | End: 2019-03-23

## 2019-03-23 RX ORDER — MORPHINE SULFATE 4 MG/ML
2 INJECTION, SOLUTION INTRAMUSCULAR; INTRAVENOUS EVERY 4 HOURS PRN
Status: DISCONTINUED | OUTPATIENT
Start: 2019-03-23 | End: 2019-03-24

## 2019-03-23 RX ORDER — PROPOFOL 10 MG/ML
INJECTION, EMULSION INTRAVENOUS PRN
Status: DISCONTINUED | OUTPATIENT
Start: 2019-03-23 | End: 2019-03-23 | Stop reason: SDUPTHER

## 2019-03-23 RX ORDER — MORPHINE SULFATE 4 MG/ML
2 INJECTION, SOLUTION INTRAMUSCULAR; INTRAVENOUS EVERY 4 HOURS PRN
Status: DISCONTINUED | OUTPATIENT
Start: 2019-03-23 | End: 2019-03-23 | Stop reason: SDUPTHER

## 2019-03-23 RX ORDER — MIDAZOLAM HYDROCHLORIDE 1 MG/ML
INJECTION INTRAMUSCULAR; INTRAVENOUS PRN
Status: DISCONTINUED | OUTPATIENT
Start: 2019-03-23 | End: 2019-03-23 | Stop reason: SDUPTHER

## 2019-03-23 RX ORDER — INSULIN GLARGINE 100 [IU]/ML
30 INJECTION, SOLUTION SUBCUTANEOUS NIGHTLY
Status: DISCONTINUED | OUTPATIENT
Start: 2019-03-23 | End: 2019-03-24

## 2019-03-23 RX ORDER — SODIUM CHLORIDE 9 MG/ML
INJECTION, SOLUTION INTRAVENOUS CONTINUOUS
Status: DISCONTINUED | OUTPATIENT
Start: 2019-03-23 | End: 2019-03-23

## 2019-03-23 RX ORDER — ACETAMINOPHEN 80 MG
TABLET,CHEWABLE ORAL
Status: DISPENSED
Start: 2019-03-23 | End: 2019-03-24

## 2019-03-23 RX ADMIN — HYDRALAZINE HYDROCHLORIDE 50 MG: 50 TABLET ORAL at 14:31

## 2019-03-23 RX ADMIN — SODIUM CHLORIDE, PRESERVATIVE FREE 10 ML: 5 INJECTION INTRAVENOUS at 22:22

## 2019-03-23 RX ADMIN — MIDAZOLAM HYDROCHLORIDE 2 MG: 1 INJECTION, SOLUTION INTRAMUSCULAR; INTRAVENOUS at 13:12

## 2019-03-23 RX ADMIN — PROPOFOL 110 MG: 10 INJECTION, EMULSION INTRAVENOUS at 13:20

## 2019-03-23 RX ADMIN — TAZOBACTAM SODIUM AND PIPERACILLIN SODIUM 3.38 G: 375; 3 INJECTION, SOLUTION INTRAVENOUS at 04:56

## 2019-03-23 RX ADMIN — INSULIN GLARGINE 30 UNITS: 100 INJECTION, SOLUTION SUBCUTANEOUS at 22:34

## 2019-03-23 RX ADMIN — HYDRALAZINE HYDROCHLORIDE 50 MG: 50 TABLET ORAL at 06:35

## 2019-03-23 RX ADMIN — MORPHINE SULFATE 2 MG: 4 INJECTION INTRAVENOUS at 14:19

## 2019-03-23 RX ADMIN — ONDANSETRON 4 MG: 2 INJECTION INTRAMUSCULAR; INTRAVENOUS at 13:20

## 2019-03-23 RX ADMIN — ONDANSETRON 4 MG: 2 INJECTION INTRAMUSCULAR; INTRAVENOUS at 07:48

## 2019-03-23 RX ADMIN — BACLOFEN 10 MG: 10 TABLET ORAL at 22:21

## 2019-03-23 RX ADMIN — VANCOMYCIN HYDROCHLORIDE 1500 MG: 5 INJECTION, POWDER, LYOPHILIZED, FOR SOLUTION INTRAVENOUS at 15:10

## 2019-03-23 RX ADMIN — INSULIN LISPRO 6 UNITS: 100 INJECTION, SOLUTION INTRAVENOUS; SUBCUTANEOUS at 10:51

## 2019-03-23 RX ADMIN — HYDRALAZINE HYDROCHLORIDE 50 MG: 50 TABLET ORAL at 22:21

## 2019-03-23 RX ADMIN — TAZOBACTAM SODIUM AND PIPERACILLIN SODIUM 3.38 G: 375; 3 INJECTION, SOLUTION INTRAVENOUS at 14:31

## 2019-03-23 RX ADMIN — DICLOFENAC EPOLAMINE 1 PATCH: 0.01 PATCH TOPICAL at 10:10

## 2019-03-23 RX ADMIN — ASPIRIN 81 MG 81 MG: 81 TABLET ORAL at 11:39

## 2019-03-23 RX ADMIN — LISINOPRIL 2.5 MG: 2.5 TABLET ORAL at 11:39

## 2019-03-23 RX ADMIN — SODIUM CHLORIDE: 9 INJECTION, SOLUTION INTRAVENOUS at 12:20

## 2019-03-23 RX ADMIN — TAZOBACTAM SODIUM AND PIPERACILLIN SODIUM 3.38 G: 375; 3 INJECTION, SOLUTION INTRAVENOUS at 10:10

## 2019-03-23 RX ADMIN — OXYCODONE HYDROCHLORIDE 7.5 MG: 5 TABLET ORAL at 22:34

## 2019-03-23 RX ADMIN — BACLOFEN 10 MG: 10 TABLET ORAL at 11:39

## 2019-03-23 RX ADMIN — VANCOMYCIN HYDROCHLORIDE 1500 MG: 5 INJECTION, POWDER, LYOPHILIZED, FOR SOLUTION INTRAVENOUS at 06:35

## 2019-03-23 RX ADMIN — PANTOPRAZOLE SODIUM 40 MG: 40 TABLET, DELAYED RELEASE ORAL at 06:35

## 2019-03-23 RX ADMIN — SODIUM CHLORIDE, PRESERVATIVE FREE 10 ML: 5 INJECTION INTRAVENOUS at 10:10

## 2019-03-23 RX ADMIN — VANCOMYCIN HYDROCHLORIDE 1500 MG: 5 INJECTION, POWDER, LYOPHILIZED, FOR SOLUTION INTRAVENOUS at 23:19

## 2019-03-23 RX ADMIN — DICLOFENAC EPOLAMINE 1 PATCH: 0.01 PATCH TOPICAL at 22:21

## 2019-03-23 RX ADMIN — MORPHINE SULFATE 2 MG: 4 INJECTION INTRAVENOUS at 02:43

## 2019-03-23 RX ADMIN — PANTOPRAZOLE SODIUM 40 MG: 40 TABLET, DELAYED RELEASE ORAL at 15:09

## 2019-03-23 RX ADMIN — VANCOMYCIN HYDROCHLORIDE 1500 MG: 5 INJECTION, POWDER, LYOPHILIZED, FOR SOLUTION INTRAVENOUS at 00:22

## 2019-03-23 RX ADMIN — METFORMIN HYDROCHLORIDE 500 MG: 500 TABLET ORAL at 19:31

## 2019-03-23 RX ADMIN — LIDOCAINE HYDROCHLORIDE 100 MG: 20 INJECTION, SOLUTION INTRAVENOUS at 13:20

## 2019-03-23 RX ADMIN — MIDAZOLAM HYDROCHLORIDE 2 MG: 1 INJECTION, SOLUTION INTRAMUSCULAR; INTRAVENOUS at 12:46

## 2019-03-23 RX ADMIN — TAZOBACTAM SODIUM AND PIPERACILLIN SODIUM 3.38 G: 375; 3 INJECTION, SOLUTION INTRAVENOUS at 22:21

## 2019-03-23 RX ADMIN — INSULIN LISPRO 25 UNITS: 100 INJECTION, SOLUTION INTRAVENOUS; SUBCUTANEOUS at 19:37

## 2019-03-23 RX ADMIN — INSULIN LISPRO 2 UNITS: 100 INJECTION, SOLUTION INTRAVENOUS; SUBCUTANEOUS at 22:34

## 2019-03-23 RX ADMIN — OXYCODONE HYDROCHLORIDE 7.5 MG: 5 TABLET ORAL at 06:42

## 2019-03-23 RX ADMIN — INSULIN LISPRO 9 UNITS: 100 INJECTION, SOLUTION INTRAVENOUS; SUBCUTANEOUS at 16:29

## 2019-03-23 RX ADMIN — INSULIN LISPRO 25 UNITS: 100 INJECTION, SOLUTION INTRAVENOUS; SUBCUTANEOUS at 16:21

## 2019-03-23 RX ADMIN — MORPHINE SULFATE 2 MG: 4 INJECTION INTRAVENOUS at 19:30

## 2019-03-23 RX ADMIN — OXYCODONE HYDROCHLORIDE 7.5 MG: 5 TABLET ORAL at 16:16

## 2019-03-23 RX ADMIN — MORPHINE SULFATE 2 MG: 4 INJECTION INTRAVENOUS at 07:54

## 2019-03-23 RX ADMIN — LINAGLIPTIN 5 MG: 5 TABLET, FILM COATED ORAL at 19:31

## 2019-03-23 RX ADMIN — INSULIN LISPRO 6 UNITS: 100 INJECTION, SOLUTION INTRAVENOUS; SUBCUTANEOUS at 19:34

## 2019-03-23 ASSESSMENT — PULMONARY FUNCTION TESTS
PIF_VALUE: 0
PIF_VALUE: 1
PIF_VALUE: 0
PIF_VALUE: 1
PIF_VALUE: 0

## 2019-03-23 ASSESSMENT — PAIN DESCRIPTION - LOCATION
LOCATION: FOOT

## 2019-03-23 ASSESSMENT — PAIN DESCRIPTION - ORIENTATION
ORIENTATION: RIGHT

## 2019-03-23 ASSESSMENT — PAIN DESCRIPTION - ONSET: ONSET: ON-GOING

## 2019-03-23 ASSESSMENT — PAIN SCALES - GENERAL
PAINLEVEL_OUTOF10: 9
PAINLEVEL_OUTOF10: 4
PAINLEVEL_OUTOF10: 8
PAINLEVEL_OUTOF10: 7
PAINLEVEL_OUTOF10: 8
PAINLEVEL_OUTOF10: 9

## 2019-03-23 ASSESSMENT — PAIN DESCRIPTION - PAIN TYPE
TYPE: SURGICAL PAIN
TYPE: ACUTE PAIN
TYPE: SURGICAL PAIN

## 2019-03-23 ASSESSMENT — PAIN DESCRIPTION - FREQUENCY: FREQUENCY: CONTINUOUS

## 2019-03-23 ASSESSMENT — PAIN DESCRIPTION - DESCRIPTORS: DESCRIPTORS: BURNING;PINS AND NEEDLES

## 2019-03-23 ASSESSMENT — PAIN DESCRIPTION - PROGRESSION: CLINICAL_PROGRESSION: NOT CHANGED

## 2019-03-23 NOTE — PROGRESS NOTES
Pt very nervous and anxious  Foot tapping  Arms tapping   Dr Lourena Anon called with ordrs   See mar

## 2019-03-23 NOTE — ANESTHESIA PRE-OP
Reviewed pre-anesthesia evaluation from 3/22/2019. No interval change. Will proceed with surgery per podiatry.

## 2019-03-23 NOTE — CONSULTS
Consults     Patient seen pre-operativerly and consent signed. Plan is amputation of the right fifth toe at the MTP joint.

## 2019-03-23 NOTE — BRIEF OP NOTE
Brief Postoperative Note  ______________________________________________________________    Patient: Petrona Ambrose  YOB: 1975  MRN: [id number]  Date of Procedure: 3/23/2019    Pre-Op Diagnosis:   1. ULCER RIGHT 5TH TOE WITH CELLULITS  2. Ulcer right first MTP joint plantarly. Post-Op Diagnosis: Same       Procedure(s):  TOE AMPUTATION RIGHT 5TH TOE. Non-excisional debridement of right first MTP joint. Anesthesia: General    Surgeon(s):  Bronson Burch DPM    Assistant: Dexter Ho    Estimated Blood Loss (mL): less than 50     Complications: None    Specimens:   ID Type Source Tests Collected by Time Destination   1 : RIGHT MIDDLE PHALANX Body Fluid Fluid SURGICAL CULTURE Bronson Burch DPM 3/23/2019 1337    2 : CULTURE RIGHT 5TH TOE Tissue Tissue SURGICAL CULTURE Bronson Burch DPM 3/23/2019 1338    A : RIGHT 5TH TOE Tissue Tissue SURGICAL PATHOLOGY Bronson Burch DPM 3/23/2019 1339        Implants:  * No implants in log *      Drains: * No LDAs found *    Findings: full thickness eschar of the dorsal fifth toe proximal phalanx.     Bronson Burch DPM  Date: 3/23/2019  Time: 1:55 PM

## 2019-03-23 NOTE — CONSULTS
Endocrinology   Consult Note  Dear Doctor  Paulino Chlid for the Consult     Pt. Was Admitted for : Diabetic foot infection/ Possible Osteomyelitis     Reason for Consult:  Better control of Blood glucose     History Obtained From:  Patient/ EMR       HISTORY OF PRESENT ILLNESS:                The patient is a 37 y.o. male with significant past medical history of DM, nephropathy, neuropathy, HPTN, Right and left foot ulcers admittted to hospital for possible osteomyelitis of tight foot . I was  consulted for better control of blood glucose. ROS:   Pt's ROS done in detail. Abnormal ROS are noted in Medical and Surgical History Section below: Other Medical History:        Diagnosis Date    Abscess 2010    scrotal    Anxiety associated with depression     Back pain 7/2/2012    Chest pain 5/1/2013    Diabetic nephropathy ([address]     Diabetic neuropathy ([address] 12/22/2011    Diabetic ulcer of left midfoot associated with type 2 diabetes mellitus, with fat layer exposed ([address] 7/18/2017    Diverticulosis     C scope + Dr. Paradise Schwab DM (diabetes mellitus), type 2 ([address] 2002    DR. Turner podiatry    Hyperlipidemia LDL goal < 100 7/15/2013    Hypertension     Internal hemorrhoid     C scope + Dr. Doherty Pilsner fracture 1988    Panic attacks     Pericarditis 2003    Hospitalized with s/p heart cath normal    Peripheral autonomic neuropathy due to diabetes mellitus ([address]     Axonal EMG- NCS, March 2011    Sebaceous cyst 9/1/2011    URI (upper respiratory infection) 2/27/2012    WD-Wound, surgical, infected, initial encounter 12/8/2017    Wrist fracture 1986     Surgical History:        Procedure Laterality Date   [address] 2003, 2013    Normal (Dr. Susi Maldonado)    COLONOSCOPY  6/2/2011    Pandiverticulosis, Nonbleeding internal hemorrhoids, Repeat colonoscopy at age 48- Dr. Nini Christopher    \"had reconstruction surgery on nose a year after the other nose surgery\"    OTHER SURGICAL HISTORY Right 05/22/2015    I & D right great toe with partial amputation    OTHER SURGICAL HISTORY  12/04/2017    inc and drainage of abcess    MS DEEP INCIS FOOT BONE INFECTN Left 9/22/2018    LEFT FOOT DEBRIDEMENT INCISION AND DRAINAGE TOP AND BOTTOM performed by Santiago Tobin DPM at [address] [place] [address] [id number]    sebaceous cyst removal times 4     TOE AMPUTATION      right great toe    TONSILLECTOMY AND ADENOIDECTOMY  1988    UPPER GASTROINTESTINAL ENDOSCOPY  06/2/2011    Mild gastritis with moderatley severe antritis, small hiatal hernia, Dr. Fannie Sharma N/A 1/12/2019    EGD BIOPSY performed by Jason Green MD at [facility] ENDOSCOPY       Allergies:  Adhesive tape; Doxycycline; and Reglan [metoclopramide]    Family History:       Problem Relation Age of Onset   Beck  Cancer Mother         ?site   Beck  Stroke Mother     Bleeding Prob Mother     Diabetes Father     Heart Disease Father     High Blood Pressure Father     Obesity Father     Kidney Disease Father     Diabetes Sister     High Blood Pressure Sister     Mental Illness Sister     Obesity Sister     High Blood Pressure Other     Mental Illness Other         bipolar    Other Son         cyst in ear canal    ADHD Daughter      REVIEW OF SYSTEMS:  Review of System Done as noted above     PHYSICAL EXAM:      Vitals:    BP (!) 151/96   Pulse 78   Temp 98.2 °F (36.8 °C) (Oral)   Resp 17   Ht 5' 9\" (1.753 m)   Wt 268 lb 1.6 oz (121.6 kg)   SpO2 97%   BMI 39.59 kg/m²     CONSTITUTIONAL:  awake, alert, cooperative, appears stated age   EYES:  vision intact Fundoscopic Exam not performed   ENT:Normal  NECK:  Supple, No JVD.    Thyroid Exam:Normal   LUNGS:  Has Vesicular Breath Sounds,   CARDIOVASCULAR:  Normal apical impulse, regular rate and rhythm, normal S1 and S2, no S3 or S4, and has no  murmur   ABDOMEN:  No scars, normal bowel sounds, soft, non-distended, non-tender, no masses palpated, no hepatolienomegaly  Musculoskeletal: Normal  Extremities: Normal, peripheral pulses normal, , has no edema   Has right 5 th toe ulcers and left foot ulcer. NEUROLOGIC:  Awake, alert, oriented to name, place and time. Cranial nerves II-XII are grossly intact. Motor is  intact. Sensory neuropathy.  ,  and gait is abnormal.    DATA:    CBC:   Recent Labs     03/21/19  0300 03/22/19  0825   WBC 9.7 5.6   HGB 12.5* 12.3*    176    CMP:  Recent Labs     03/21/19  0300 03/22/19  0825 03/23/19  0609   * 136  --    K 4.1 4.0  --    CL 99 104  --    CO2 21 24  --    BUN 29* 13  --    CREATININE 1.2 0.9 1.1   CALCIUM 7.9* 7.6*  --    PROT 6.5  --   --    LABALBU 3.1*  --   --    BILITOT 0.3  --   --    ALKPHOS 55  --   --    AST 16  --   --    ALT 20  --   --      Lipids:   Lab Results   Component Value Date    CHOL 173 07/24/2017    CHOL 168 10/15/2013    HDL 35 07/24/2017    TRIG 231 07/24/2017     Glucose:   Recent Labs     03/22/19  1412 03/22/19  1717 03/22/19  2254   POCGLU 228* 334* 156*     Hemoglobin A1C:   Lab Results   Component Value Date    LABA1C 8.7 03/21/2019     Free T4:   Lab Results   Component Value Date    T4FREE 1.25 07/24/2017     Free T3: No results found for: FT3  TSH High Sensitivity:   Lab Results   Component Value Date    TSHHS 2.300 07/24/2017       Xr Foot Left (min 3 Views)    Result Date: 3/21/2019  EXAMINATION: 3 XRAY VIEWS OF THE RIGHT FOOT; 3 XRAY VIEWS OF THE LEFT FOOT 3/21/2019 3:46 am; 3/21/2019 3:47 am COMPARISON: Left foot radiograph 01/07/2019, right foot radiograph 05/21/2015 HISTORY: ORDERING SYSTEM PROVIDED HISTORY: toe pain TECHNOLOGIST PROVIDED HISTORY: Reason for exam:->toe pain Ordering Physician Provided Reason for Exam: toe pain Acuity: Acute Type of Exam: Initial Mechanism of Injury: toe pain Relevant Medical/Surgical History: toe pain; ORDERING SYSTEM PROVIDED HISTORY: ulcer to bottom of foot TECHNOLOGIST PROVIDED HISTORY: Unchanged findings of periostitis associated with the 3rd and 4th metatarsals. Remnant joints maintain anatomic alignment. Calcaneal enthesophytes at the insertion of the Achilles tendon and origin of the plantar aponeurosis. Suspected ulceration in the plantar forefoot at the level of the metatarsophalangeal joints. Soft tissue swelling predominantly in the forefoot. No soft tissue gas nor radiopaque foreign body. 1. Ulceration in the plantar left forefoot with no definite findings of underlying osteomyelitis or necrotizing fasciitis. Soft tissue swelling in the left forefoot likely represent cellulitis. 2. Soft tissue swelling in the right forefoot also likely represents cellulitis. No findings of underlying necrotizing fasciitis. 0.2 cm radiopaque foreign body in the plantar right forefoot. 3. Extensive new destructive changes centered about the right 2nd through 5th tarsometatarsal joints, potentially related to chronic osteomyelitis or neuropathic arthropathy. Of note, there is a nonunited fracture of the proximal right 5th metatarsal shaft. 4. Increased osteolysis of the right 1st distal phalanx. Xr Chest Portable    Result Date: 3/21/2019  EXAMINATION: SINGLE XRAY VIEW OF THE CHEST 3/21/2019 2:34 am COMPARISON: Chest radiograph 10/06/2018 HISTORY: ORDERING SYSTEM PROVIDED HISTORY: dizziness TECHNOLOGIST PROVIDED HISTORY: Reason for exam:->dizziness Ordering Physician Provided Reason for Exam: dizziness Acuity: Acute Type of Exam: Initial Mechanism of Injury: dizziness Relevant Medical/Surgical History: dizziness FINDINGS: Clear lungs. No findings of pneumothorax or pleural effusion. Normal mediastinal, hilar, and cardiac contours. No acute fracture. No acute cardiopulmonary process.      Vl Dup Lower Extremity Venous Bilateral    Result Date: 3/21/2019  EXAMINATION: DUPLEX VENOUS ULTRASOUND OF THE BILATERAL LOWER EXTREMITIES, 3/21/2019 3:21 am TECHNIQUE: Duplex ultrasound and Doppler Abscess    Periumbilical hernia    WD-Wound, surgical, infected, initial encounter    [facility]is [facility]    Cellulitis of left foot    Constipation    Intractable nausea and vomiting    Uncontrolled type 2 diabetes mellitus (HCC)    Nausea and vomiting    Diabetic foot infection ([facility]         RECOMMENDATIONS:      1. Reviewed POC blood glucose . Labs and X ray results   2. Reviewed Home and Current Medicines   3. Will Start On meal/ Correction bolus Humalog/ Lantus Insulin regime / OHGD ,Metformin+ Tradjenta   4. Monitor Blood glucose frequently   5. Modify  the dose of Insulin/ OHGD  as needed        Will follow with you  Again thank you for sharing pt's care with me.      Truly yours,       Norma Long MD

## 2019-03-23 NOTE — PROGRESS NOTES
[address] [facility]  consulted by Dr. Miguelina Pisano for monitoring and adjustment. Indication for treatment: Diabetic Foot Infection  Goal trough: 15-20 mcg/mL     Pertinent Laboratory Values:   Temp Readings from Last 3 Encounters:   03/23/19 97.8 °F (36.6 °C) (Oral)   01/12/19 98 °F (36.7 °C) (Oral)   01/09/19 98 °F (36.7 °C) (Oral)     Recent Labs     03/21/19  0300 03/21/19  0917 03/22/19  0825   WBC 9.7  --  5.6   LACTATE 2.1  LACT CALLED TO GRISEL JOSEPH IN ED AT [facility]  RESULTS READ BACK  * 2.2*  --      Recent Labs     03/21/19  0300 03/22/19  0825 03/23/19  0609   BUN 29* 13  --    CREATININE 1.2 0.9 1.1     Estimated Creatinine Clearance: 112 mL/min (based on SCr of 1.1 mg/dL). Intake/Output Summary (Last 24 hours) at 3/23/2019 1123  Last data filed at 3/23/2019 1057  Gross per 24 hour   Intake 1800 ml   Output 4000 ml   Net -2200 ml       Pertinent Cultures:  Date    Source    Results  3/21   C. Diff    Ordered    Vancomycin level:   TROUGH:    Recent Labs     03/22/19  1401   VANCOTROUGH 16.6     RANDOM:  No results for input(s): VANCORANDOM in the last 72 hours. Assessment:  · WBC and temperature: WBC WNL. Patient is afebrile. · SCr, BUN, and urine output: 0.9 --> 1.1  · Day(s) of therapy: #3  · Vancomycin level: 16.6 mcg/mL - therapeutic    Plan:  · Plan to continue current vancomycin dosing: vancomycin 1500 mg IVPB every 8 hours. · Given concurrent Zosyn therapy and hx diabetes, renal function will be followed closely. · Plan for next/repeat trough level: 03-24-19 @ 0630. · Pharmacy will continue to monitor patient and adjust therapy as indicated.     Thank you for the consult,    Monserrat Nobles PharmD, [facility]  3/23/2019 11:28 AM

## 2019-03-24 LAB
CREAT SERPL-MCNC: 1.3 MG/DL (ref 0.9–1.3)
DOSE AMOUNT: ABNORMAL
DOSE TIME: ABNORMAL
GFR AFRICAN AMERICAN: >60 ML/MIN/1.73M2
GFR NON-AFRICAN AMERICAN: >60 ML/MIN/1.73M2
GLUCOSE BLD-MCNC: 120 MG/DL (ref 70–99)
GLUCOSE BLD-MCNC: 166 MG/DL (ref 70–99)
GLUCOSE BLD-MCNC: 201 MG/DL (ref 70–99)
GLUCOSE BLD-MCNC: 215 MG/DL (ref 70–99)
GLUCOSE BLD-MCNC: 224 MG/DL (ref 70–99)
VANCOMYCIN TROUGH: 23.6 UG/ML (ref 10–20)

## 2019-03-24 PROCEDURE — 6360000002 HC RX W HCPCS: Performed by: PODIATRIST

## 2019-03-24 PROCEDURE — 6360000002 HC RX W HCPCS: Performed by: HOSPITALIST

## 2019-03-24 PROCEDURE — 2580000003 HC RX 258: Performed by: PODIATRIST

## 2019-03-24 PROCEDURE — 36415 COLL VENOUS BLD VENIPUNCTURE: CPT

## 2019-03-24 PROCEDURE — 6370000000 HC RX 637 (ALT 250 FOR IP): Performed by: PODIATRIST

## 2019-03-24 PROCEDURE — 6370000000 HC RX 637 (ALT 250 FOR IP): Performed by: HOSPITALIST

## 2019-03-24 PROCEDURE — 82962 GLUCOSE BLOOD TEST: CPT

## 2019-03-24 PROCEDURE — 2580000003 HC RX 258: Performed by: HOSPITALIST

## 2019-03-24 PROCEDURE — 94761 N-INVAS EAR/PLS OXIMETRY MLT: CPT

## 2019-03-24 PROCEDURE — 6370000000 HC RX 637 (ALT 250 FOR IP): Performed by: INTERNAL MEDICINE

## 2019-03-24 PROCEDURE — 1200000000 HC SEMI PRIVATE

## 2019-03-24 PROCEDURE — 80202 ASSAY OF VANCOMYCIN: CPT

## 2019-03-24 PROCEDURE — 82565 ASSAY OF CREATININE: CPT

## 2019-03-24 RX ORDER — DOCUSATE SODIUM 100 MG/1
100 CAPSULE, LIQUID FILLED ORAL 2 TIMES DAILY
Status: DISCONTINUED | OUTPATIENT
Start: 2019-03-24 | End: 2019-03-26 | Stop reason: HOSPADM

## 2019-03-24 RX ORDER — MORPHINE SULFATE 4 MG/ML
2 INJECTION, SOLUTION INTRAMUSCULAR; INTRAVENOUS
Status: DISPENSED | OUTPATIENT
Start: 2019-03-24 | End: 2019-03-25

## 2019-03-24 RX ORDER — INSULIN GLARGINE 100 [IU]/ML
40 INJECTION, SOLUTION SUBCUTANEOUS NIGHTLY
Status: DISCONTINUED | OUTPATIENT
Start: 2019-03-24 | End: 2019-03-26 | Stop reason: HOSPADM

## 2019-03-24 RX ADMIN — TAZOBACTAM SODIUM AND PIPERACILLIN SODIUM 3.38 G: 375; 3 INJECTION, SOLUTION INTRAVENOUS at 16:17

## 2019-03-24 RX ADMIN — PANTOPRAZOLE SODIUM 40 MG: 40 TABLET, DELAYED RELEASE ORAL at 18:10

## 2019-03-24 RX ADMIN — HYDRALAZINE HYDROCHLORIDE 50 MG: 50 TABLET ORAL at 21:18

## 2019-03-24 RX ADMIN — DICLOFENAC EPOLAMINE 1 PATCH: 0.01 PATCH TOPICAL at 21:18

## 2019-03-24 RX ADMIN — METFORMIN HYDROCHLORIDE 1000 MG: 500 TABLET ORAL at 09:35

## 2019-03-24 RX ADMIN — MORPHINE SULFATE 2 MG: 4 INJECTION INTRAVENOUS at 08:32

## 2019-03-24 RX ADMIN — LISINOPRIL 2.5 MG: 2.5 TABLET ORAL at 09:35

## 2019-03-24 RX ADMIN — SODIUM CHLORIDE, PRESERVATIVE FREE 10 ML: 5 INJECTION INTRAVENOUS at 09:35

## 2019-03-24 RX ADMIN — SODIUM CHLORIDE: 9 INJECTION, SOLUTION INTRAVENOUS at 09:57

## 2019-03-24 RX ADMIN — INSULIN LISPRO 3 UNITS: 100 INJECTION, SOLUTION INTRAVENOUS; SUBCUTANEOUS at 14:02

## 2019-03-24 RX ADMIN — VANCOMYCIN HYDROCHLORIDE 1500 MG: 5 INJECTION, POWDER, LYOPHILIZED, FOR SOLUTION INTRAVENOUS at 18:35

## 2019-03-24 RX ADMIN — ONDANSETRON 4 MG: 2 INJECTION INTRAMUSCULAR; INTRAVENOUS at 16:17

## 2019-03-24 RX ADMIN — INSULIN LISPRO 25 UNITS: 100 INJECTION, SOLUTION INTRAVENOUS; SUBCUTANEOUS at 10:06

## 2019-03-24 RX ADMIN — ONDANSETRON 4 MG: 2 INJECTION INTRAMUSCULAR; INTRAVENOUS at 08:32

## 2019-03-24 RX ADMIN — SODIUM CHLORIDE, PRESERVATIVE FREE 10 ML: 5 INJECTION INTRAVENOUS at 21:21

## 2019-03-24 RX ADMIN — INSULIN GLARGINE 40 UNITS: 100 INJECTION, SOLUTION SUBCUTANEOUS at 22:09

## 2019-03-24 RX ADMIN — MORPHINE SULFATE 2 MG: 4 INJECTION INTRAVENOUS at 01:02

## 2019-03-24 RX ADMIN — DOCUSATE SODIUM 100 MG: 100 CAPSULE, LIQUID FILLED ORAL at 11:55

## 2019-03-24 RX ADMIN — INSULIN LISPRO 3 UNITS: 100 INJECTION, SOLUTION INTRAVENOUS; SUBCUTANEOUS at 04:09

## 2019-03-24 RX ADMIN — HYDRALAZINE HYDROCHLORIDE 50 MG: 50 TABLET ORAL at 14:05

## 2019-03-24 RX ADMIN — HYDRALAZINE HYDROCHLORIDE 50 MG: 50 TABLET ORAL at 07:01

## 2019-03-24 RX ADMIN — OXYCODONE HYDROCHLORIDE 7.5 MG: 5 TABLET ORAL at 14:04

## 2019-03-24 RX ADMIN — INSULIN LISPRO 6 UNITS: 100 INJECTION, SOLUTION INTRAVENOUS; SUBCUTANEOUS at 22:09

## 2019-03-24 RX ADMIN — MORPHINE SULFATE 2 MG: 4 INJECTION INTRAVENOUS at 20:49

## 2019-03-24 RX ADMIN — BACLOFEN 10 MG: 10 TABLET ORAL at 21:17

## 2019-03-24 RX ADMIN — DOCUSATE SODIUM 100 MG: 100 CAPSULE, LIQUID FILLED ORAL at 21:17

## 2019-03-24 RX ADMIN — MORPHINE SULFATE 2 MG: 4 INJECTION INTRAVENOUS at 16:17

## 2019-03-24 RX ADMIN — BACLOFEN 10 MG: 10 TABLET ORAL at 09:36

## 2019-03-24 RX ADMIN — TAZOBACTAM SODIUM AND PIPERACILLIN SODIUM 3.38 G: 375; 3 INJECTION, SOLUTION INTRAVENOUS at 03:59

## 2019-03-24 RX ADMIN — TAZOBACTAM SODIUM AND PIPERACILLIN SODIUM 3.38 G: 375; 3 INJECTION, SOLUTION INTRAVENOUS at 21:18

## 2019-03-24 RX ADMIN — INSULIN LISPRO 25 UNITS: 100 INJECTION, SOLUTION INTRAVENOUS; SUBCUTANEOUS at 14:03

## 2019-03-24 RX ADMIN — DICLOFENAC EPOLAMINE 1 PATCH: 0.01 PATCH TOPICAL at 09:36

## 2019-03-24 RX ADMIN — ENOXAPARIN SODIUM 40 MG: 40 INJECTION SUBCUTANEOUS at 09:34

## 2019-03-24 RX ADMIN — LINAGLIPTIN 5 MG: 5 TABLET, FILM COATED ORAL at 09:35

## 2019-03-24 RX ADMIN — INSULIN LISPRO 6 UNITS: 100 INJECTION, SOLUTION INTRAVENOUS; SUBCUTANEOUS at 10:01

## 2019-03-24 RX ADMIN — ASPIRIN 81 MG 81 MG: 81 TABLET ORAL at 09:36

## 2019-03-24 RX ADMIN — PANTOPRAZOLE SODIUM 40 MG: 40 TABLET, DELAYED RELEASE ORAL at 07:01

## 2019-03-24 RX ADMIN — MORPHINE SULFATE 2 MG: 4 INJECTION INTRAVENOUS at 11:55

## 2019-03-24 RX ADMIN — TAZOBACTAM SODIUM AND PIPERACILLIN SODIUM 3.38 G: 375; 3 INJECTION, SOLUTION INTRAVENOUS at 09:57

## 2019-03-24 ASSESSMENT — PAIN SCALES - GENERAL
PAINLEVEL_OUTOF10: 8
PAINLEVEL_OUTOF10: 8
PAINLEVEL_OUTOF10: 7
PAINLEVEL_OUTOF10: 8

## 2019-03-24 ASSESSMENT — PAIN DESCRIPTION - PROGRESSION
CLINICAL_PROGRESSION: NOT CHANGED
CLINICAL_PROGRESSION: NOT CHANGED

## 2019-03-24 ASSESSMENT — PAIN DESCRIPTION - LOCATION: LOCATION: FOOT

## 2019-03-24 ASSESSMENT — PAIN DESCRIPTION - ORIENTATION: ORIENTATION: RIGHT

## 2019-03-24 ASSESSMENT — PAIN DESCRIPTION - PAIN TYPE: TYPE: SURGICAL PAIN

## 2019-03-24 ASSESSMENT — PAIN DESCRIPTION - ONSET: ONSET: ON-GOING

## 2019-03-24 ASSESSMENT — PAIN DESCRIPTION - DESCRIPTORS: DESCRIPTORS: BURNING;PINS AND NEEDLES

## 2019-03-24 ASSESSMENT — PAIN DESCRIPTION - FREQUENCY: FREQUENCY: CONTINUOUS

## 2019-03-24 NOTE — PROGRESS NOTES
[facility]  consulted by Dr. Sabi Kan for monitoring and adjustment. Indication for treatment: Diabetic Foot Infection  Goal trough: 15-20 mcg/mL     Pertinent Laboratory Values:   Temp Readings from Last 3 Encounters:   03/24/19 98 °F (36.7 °C) (Oral)   03/23/19 98.6 °F (37 °C)   01/12/19 98 °F (36.7 °C) (Oral)     Recent Labs     03/21/19  0917 03/22/19  0825   WBC  --  5.6   LACTATE 2.2*  --      Recent Labs     03/22/19  0825 03/23/19  0609 03/24/19  0752   BUN 13  --   --    CREATININE 0.9 1.1 1.3     Estimated Creatinine Clearance: 94 mL/min (based on SCr of 1.3 mg/dL). Intake/Output Summary (Last 24 hours) at 3/24/2019 0916  Last data filed at 3/24/2019 0849  Gross per 24 hour   Intake 480 ml   Output 3375 ml   Net -2895 ml       Pertinent Cultures:  Date    Source    Results  3/21   C. Diff    Ordered    Vancomycin level:   TROUGH:    Recent Labs     03/22/19  1401 03/24/19  0752   VANCOTROUGH 16.6 23.6*     RANDOM:  No results for input(s): VANCORANDOM in the last 72 hours. Assessment:  · WBC and temperature: WBC WNL. Patient is afebrile. · SCr, BUN, and urine output: 0.9 --> 1.3  · Day(s) of therapy: #4  · Vancomycin level: 23.6    Plan:  · Will decrease to vancomycin 1500mg q12h starting at 1700 today   · If renal function continues to trend up, will hold vancomycin   · Next trough 03/26/19 @ 0430  · Pharmacy will continue to monitor patient and adjust therapy as indicated.     Thank you for the consult,    Radha Putnam PharmD, [facility]  3/24/2019 9:16 AM

## 2019-03-24 NOTE — CONSULTS
Consults   This patient was seen today 1 day postop from amputation of the right fifth digit from the right foot. Yesterday he developed severe postoperative pain and was placed back on morphine every 2 hours for pain. Today I find that he is a chronic pain patient and at home he was using 7.5 mg of Percocet. I'm not sure of his frequency. At this point he may need a pain consult or a hospitalist me want to convert him from morphine to Percocet. Objectively, he has staph aureus as cultured from the wound yesterday. The final results are not available yet. Assessment:  Patient needs to get his pain control. Please discuss with hospitalist.  Dressing change will be carried out today by nursing staff. I will see this patient tomorrow for evaluation. Patient can be discharged per hospitalist and follow-up in my office.

## 2019-03-24 NOTE — PROGRESS NOTES
HOSPITALIST PROGRESS NOTE  Date: 3/24/2019   Name: Michael Oquendo   MRN: [id number]   YOB: 1975      Subjective/Interval Hx:   Patient stated that his pain is controlled with medication, is awaiting to be evaluated by ENT to express the need for amputation either today or tomorrow via surgery    Objective:   Physical Exam:   BP (!) 130/94   Pulse 91   Temp 98.4 °F (36.9 °C) (Oral)   Resp 16   Ht 5' 9\" (1.753 m)   Wt 268 lb 1.6 oz (121.6 kg)   SpO2 99%   BMI 39.59 kg/m²   General: no acute distress, well nourished and well hydrated  HEENT: NCAT  Heart: S1S2 RRR  Lungs: Clear to ascultation bilaterally, respiratory effort normal  Abdomen: soft, NT/ND, positive bowel sounds  Extremities: no pitting edema, nontender ; necrosis to the right second toe and fifth toe  Neuro: patient is awake, alert and orientated times 3, no gross deficits  Skin: no rashes or ecchymosis        Meds:   Meds:    metFORMIN  1,000 mg Oral BID WC    insulin glargine  40 Units Subcutaneous Nightly    insulin lispro  0-18 Units Subcutaneous 2 times per day    vancomycin  1,500 mg Intravenous Q12H    docusate sodium  100 mg Oral BID    insulin lispro  25 Units Subcutaneous TID WC    linagliptin  5 mg Oral Daily    insulin lispro  0-18 Units Subcutaneous TID WC    piperacillin-tazobactam  3.375 g Intravenous Q6H    aspirin  81 mg Oral Daily    baclofen  10 mg Oral BID    diclofenac  1 patch Transdermal BID    hydrALAZINE  50 mg Oral TID    lisinopril  2.5 mg Oral Daily    pantoprazole  40 mg Oral BID AC    sodium chloride flush  10 mL Intravenous 2 times per day    enoxaparin  40 mg Subcutaneous Daily      Infusions:    dextrose      sodium chloride 100 mL/hr at 03/24/19 0957     PRN Meds:     morphine 2 mg Q2H PRN   ondansetron 4 mg Q6H PRN   sodium chloride flush 10 mL PRN   magnesium hydroxide 30 mL Daily PRN   glucose 15 g PRN   dextrose 12.5 g PRN   glucagon (rDNA) 1 mg PRN   dextrose 100 mL/hr PRN acetaminophen 650 mg Q4H PRN   oxyCODONE 7.5 mg Q6H PRN       Data/Labs:     Recent Labs     03/22/19  0825   WBC 5.6   HGB 12.3*   HCT 36.3*         Recent Labs     03/22/19  0825 03/23/19  0609 03/24/19  0752     --   --    K 4.0  --   --      --   --    CO2 24  --   --    BUN 13  --   --    CREATININE 0.9 1.1 1.3     No results for input(s): AST, ALT, ALB, BILIDIR, BILITOT, ALKPHOS in the last 72 hours. Recent Labs     03/22/19  0825   INR 1.02     No results for input(s): CKTOTAL, CKMB, CKMBINDEX, TROPONINT in the last 72 hours. I/O last 3 completed shifts: In: 480 [P.O.:480]  Out: 3050 [Urine:3050]    Intake/Output Summary (Last 24 hours) at 3/24/2019 1103  Last data filed at 3/24/2019 1047  Gross per 24 hour   Intake 780 ml   Output 1900 ml   Net -1120 ml        Assessment/Plan:    diabetic foot infection/Ulceration to Right 5th MTP and Left 2nd MTP  - concern about OM  - admit, IV Vanc, zosyn, consult podiatry, NPO  - pain control.  Doppler neg for DVT.  03/22/19- is followed by podiatry who plans on amputation if indicated today or tomorrow     > DM type 2 uncontrolled  - lantus, SSI, consult endo     > dizziness, dyspnea episodes  - BP and CXR WNL  - check echo, cardiac tele, IVF     Hypertension-lisinopril, hydralazine, monitor blood pressure      DVT Prophylaxis: lovenox  Diet: DIET CARB CONTROL;  Code Status: Full Code    Dispo: when stable    Electronically signed by Jonatan Brannon MD on 3/24/2019 at 11:03 AM

## 2019-03-24 NOTE — PROGRESS NOTES
PRN   glucagon (rDNA) 1 mg PRN   dextrose 100 mL/hr PRN   acetaminophen 650 mg Q4H PRN   oxyCODONE 7.5 mg Q6H PRN       Data/Labs:     Recent Labs     03/22/19  0825   WBC 5.6   HGB 12.3*   HCT 36.3*         Recent Labs     03/22/19  0825 03/23/19  0609 03/24/19  0752     --   --    K 4.0  --   --      --   --    CO2 24  --   --    BUN 13  --   --    CREATININE 0.9 1.1 1.3     No results for input(s): AST, ALT, ALB, BILIDIR, BILITOT, ALKPHOS in the last 72 hours. Recent Labs     03/22/19  0825   INR 1.02     No results for input(s): CKTOTAL, CKMB, CKMBINDEX, TROPONINT in the last 72 hours. I/O last 3 completed shifts: In: 480 [P.O.:480]  Out: 3050 [Urine:3050]    Intake/Output Summary (Last 24 hours) at 3/24/2019 1104  Last data filed at 3/24/2019 1047  Gross per 24 hour   Intake 780 ml   Output 1900 ml   Net -1120 ml        Assessment/Plan:    diabetic foot infection/Ulceration to Right 5th MTP and Left 2nd MTP  - concern about OM  - admit, IV Vanc, zosyn, consult podiatry, NPO  - pain control.  Doppler neg for DVT.  03/22/19- is followed by podiatry who plans on amputation if indicated today or tomorrow     > DM type 2 uncontrolled  - lantus, SSI, consult endo     > dizziness, dyspnea episodes  - BP and CXR WNL  - check echo, cardiac tele, IVF     Hypertension-lisinopril, hydralazine, monitor blood pressure      DVT Prophylaxis: lovenox  Diet: DIET CARB CONTROL;  Code Status: Full Code    Dispo: when stable; when C/S is back for correct oral atbx or IV    Electronically signed by Thao Simpson MD on 3/24/2019 at 11:04 AM

## 2019-03-24 NOTE — OP NOTE
[address] [place] [address], [address] [place] [address]                                OPERATIVE REPORT    PATIENT NAME: Gamaliel Cutler                     :        1975  MED REC NO:   [id number]                          ROOM:       9756  ACCOUNT NO:   [de-identified]                           ADMIT DATE: 2019  PROVIDER:     Chrissie Bronson DPM    DATE OF PROCEDURE:  2019    SURGEON ON CASE:  Chrissie Bronson DPM    PREOPERATIVE DIAGNOSES:  1. Infected right fifth toe with full thickness ulceration. 2.  Ulceration of plantar aspect of the right fifth metatarsophalangeal  joint. POSTOPERATIVE DIAGNOSES:  1. Infected right fifth toe with full thickness ulceration. 2.  Ulceration of plantar aspect of the right fifth metatarsophalangeal  joint. PROCEDURES PERFORMED:  1. Amputation of the right fifth toe. 2.  Non-excisional debridement of the plantar ulceration of right fifth  metatarsophalangeal joint. DESCRIPTION OF PROCEDURE:  The patient was brought to the operating  room, placed supine on the operating room table where general anesthesia  was obtained. The patient then had a tourniquet placed about the right  ankle area. The right foot was prepped and draped in the usual sterile  manner. An Esmarch was utilized to exsanguinate the limb prior to  inflation of the tourniquet to 250 mmHg. The Esmarch was removed and  the procedure began on his right foot. A 15-blade was utilized to make an incision just proximal to the  proximal interphalangeal joint on dorsal aspect of the foot. This was  made on the dorsal aspect and then a second fishmouth incision was made  from that incision around the tip of the toe medially and laterally  converging on the first incision. Through this, the distal and middle  phalanx were splayed taking the dorsal skin with the splayed toe.   The  dorsal skin had a full thickness eschar down to the

## 2019-03-24 NOTE — PROGRESS NOTES
Progress Note( Dr. Abril Connor)  3/24/2019  Subjective:   Admit Date: 3/21/2019  PCP: Jose Hyde MD    Admitted For :  Right and left foot ulcers     Consulted For: better control of DM     Interval History: Had  Amputation of Right 5 th toe /  C/O severe pain    Denies any chest pains,   Denies SOB . Denies nausea or vomiting. No new bowel or bladder symptoms.        Intake/Output Summary (Last 24 hours) at 3/24/2019 0701  Last data filed at 3/23/2019 1734  Gross per 24 hour   Intake 480 ml   Output 3050 ml   Net -2570 ml       DATA    CBC:   Recent Labs     03/22/19  0825   WBC 5.6   HGB 12.3*       CMP:  Recent Labs     03/22/19  0825 03/23/19  0609     --    K 4.0  --      --    CO2 24  --    BUN 13  --    CREATININE 0.9 1.1   CALCIUM 7.6*  --      Lipids:   Lab Results   Component Value Date    CHOL 173 07/24/2017    CHOL 168 10/15/2013    HDL 35 07/24/2017    TRIG 231 07/24/2017     Glucose:  Recent Labs     03/23/19  1853 03/23/19  2228 03/24/19  0405   POCGLU 248* 170* 201*     [id number]:  Lab Results   Component Value Date    LABA1C 8.7 03/21/2019     High Sensitivity TSH:   Lab Results   Component Value Date    TSHHS 2.300 07/24/2017     Free T3: No results found for: FT3  Free T4:  Lab Results   Component Value Date    T4FREE 1.25 07/24/2017             Scheduled Medicines   Medications:    metFORMIN  1,000 mg Oral BID WC    insulin glargine  40 Units Subcutaneous Nightly    insulin lispro  0-18 Units Subcutaneous 2 times per day    insulin lispro  25 Units Subcutaneous TID WC    linagliptin  5 mg Oral Daily    insulin lispro  0-18 Units Subcutaneous TID WC    piperacillin-tazobactam  3.375 g Intravenous Q6H    aspirin  81 mg Oral Daily    baclofen  10 mg Oral BID    diclofenac  1 patch Transdermal BID    hydrALAZINE  50 mg Oral TID    lisinopril  2.5 mg Oral Daily    pantoprazole  40 mg Oral BID AC    sodium chloride flush  10 mL Intravenous 2 times per day    enoxaparin  40 mg Subcutaneous Daily    vancomycin  1,500 mg Intravenous Q8H      Infusions:    dextrose      sodium chloride 100 mL/hr at 03/23/19 1220         Objective:   Vitals: BP (!) 144/84   Pulse 75   Temp 98 °F (36.7 °C) (Oral)   Resp 15   Ht 5' 9\" (1.753 m)   Wt 268 lb 1.6 oz (121.6 kg)   SpO2 97%   BMI 39.59 kg/m²   General appearance: alert and cooperative with exam  Neck: no JVD or bruit  Thyroid : Normal lobes   Lungs: Has Vesicular Breath sounds   Heart:  regular rate and rhythm  Abdomen: soft, non-tender; bowel sounds normal; no masses,  no organomegaly  Musculoskeletal: Normal  Extremities: extremities normal, , no edemaRight 5 Th amputated   Neurologic:  Awake, alert, oriented to name, place and time. Cranial nerves II-XII are grossly intact. Motor is  intact. Sensory is neuropathy . ,  and gait is abnormal.    Assessment:     Patient Active Problem List:     Hiatal hernia     Diabetes mellitus type 2, improved controlled     Diabetic neuropathy (HCC)     Panic attack     Anxiety associated with depression     Neck pain     DANIELA (obstructive sleep apnea)     Urinary frequency     Bilateral carpal tunnel syndrome- left worse than right     Hyperlipidemia with target LDL less than 100     HTN, goal below 140/90     Bronchitis     Osteomyelitis (HCC)     Diabetic ulcer of left midfoot (HCC)     WD-Diabetic ulcer of left midfoot associated with type 2 diabetes mellitus, with fat layer exposed ([address]     Abscess     Periumbilical hernia     WD-Wound, surgical, infected, initial encounter     Sepsis ([address]     Cellulitis of left foot     Constipation     Intractable nausea and vomiting     Uncontrolled type 2 diabetes mellitus (HCC)     Nausea and vomiting     Diabetic foot infection ([address]      Plan:     1. Reviewed POC blood glucose . Labs and X ray results   2. Reviewed Current Medicines   3.  On meal/ Correction bolus Humalog/ Basal Lantus Insulin regime / and Oral Hypoglycemic drugs 4. Monitor Blood glucose frequently   5. Modified  the dose of Insulin/ other medicines as needed   6. Will follow     .      Benjie Carmona MD

## 2019-03-25 LAB
CREAT SERPL-MCNC: 1.2 MG/DL (ref 0.9–1.3)
GFR AFRICAN AMERICAN: >60 ML/MIN/1.73M2
GFR NON-AFRICAN AMERICAN: >60 ML/MIN/1.73M2
GLUCOSE BLD-MCNC: 130 MG/DL (ref 70–99)
GLUCOSE BLD-MCNC: 138 MG/DL (ref 70–99)
GLUCOSE BLD-MCNC: 152 MG/DL (ref 70–99)
GLUCOSE BLD-MCNC: 176 MG/DL (ref 70–99)
GLUCOSE BLD-MCNC: 198 MG/DL (ref 70–99)
GLUCOSE BLD-MCNC: 199 MG/DL (ref 70–99)
GLUCOSE BLD-MCNC: 211 MG/DL (ref 70–99)

## 2019-03-25 PROCEDURE — 6370000000 HC RX 637 (ALT 250 FOR IP): Performed by: PODIATRIST

## 2019-03-25 PROCEDURE — 36415 COLL VENOUS BLD VENIPUNCTURE: CPT

## 2019-03-25 PROCEDURE — 6370000000 HC RX 637 (ALT 250 FOR IP): Performed by: INTERNAL MEDICINE

## 2019-03-25 PROCEDURE — 6360000002 HC RX W HCPCS: Performed by: PODIATRIST

## 2019-03-25 PROCEDURE — 6370000000 HC RX 637 (ALT 250 FOR IP): Performed by: HOSPITALIST

## 2019-03-25 PROCEDURE — 6360000002 HC RX W HCPCS: Performed by: HOSPITALIST

## 2019-03-25 PROCEDURE — 2580000003 HC RX 258: Performed by: PODIATRIST

## 2019-03-25 PROCEDURE — 1200000000 HC SEMI PRIVATE

## 2019-03-25 PROCEDURE — 82962 GLUCOSE BLOOD TEST: CPT

## 2019-03-25 PROCEDURE — 2580000003 HC RX 258: Performed by: HOSPITALIST

## 2019-03-25 PROCEDURE — 82565 ASSAY OF CREATININE: CPT

## 2019-03-25 RX ORDER — ACETAMINOPHEN 80 MG
TABLET,CHEWABLE ORAL
Status: COMPLETED
Start: 2019-03-25 | End: 2019-03-25

## 2019-03-25 RX ORDER — PROMETHAZINE HYDROCHLORIDE 25 MG/ML
6.25 INJECTION, SOLUTION INTRAMUSCULAR; INTRAVENOUS EVERY 6 HOURS PRN
Status: DISCONTINUED | OUTPATIENT
Start: 2019-03-25 | End: 2019-03-26 | Stop reason: HOSPADM

## 2019-03-25 RX ADMIN — DICLOFENAC EPOLAMINE 1 PATCH: 0.01 PATCH TOPICAL at 22:06

## 2019-03-25 RX ADMIN — VANCOMYCIN HYDROCHLORIDE 1500 MG: 5 INJECTION, POWDER, LYOPHILIZED, FOR SOLUTION INTRAVENOUS at 16:43

## 2019-03-25 RX ADMIN — TAZOBACTAM SODIUM AND PIPERACILLIN SODIUM 3.38 G: 375; 3 INJECTION, SOLUTION INTRAVENOUS at 04:13

## 2019-03-25 RX ADMIN — HYDRALAZINE HYDROCHLORIDE 50 MG: 50 TABLET ORAL at 05:59

## 2019-03-25 RX ADMIN — TAZOBACTAM SODIUM AND PIPERACILLIN SODIUM 3.38 G: 375; 3 INJECTION, SOLUTION INTRAVENOUS at 09:03

## 2019-03-25 RX ADMIN — METFORMIN HYDROCHLORIDE 1000 MG: 500 TABLET ORAL at 08:53

## 2019-03-25 RX ADMIN — DICLOFENAC EPOLAMINE 1 PATCH: 0.01 PATCH TOPICAL at 08:54

## 2019-03-25 RX ADMIN — BACLOFEN 10 MG: 10 TABLET ORAL at 20:51

## 2019-03-25 RX ADMIN — SODIUM CHLORIDE: 9 INJECTION, SOLUTION INTRAVENOUS at 15:07

## 2019-03-25 RX ADMIN — SODIUM CHLORIDE, PRESERVATIVE FREE 10 ML: 5 INJECTION INTRAVENOUS at 20:51

## 2019-03-25 RX ADMIN — METFORMIN HYDROCHLORIDE 1000 MG: 500 TABLET ORAL at 16:43

## 2019-03-25 RX ADMIN — ENOXAPARIN SODIUM 40 MG: 40 INJECTION SUBCUTANEOUS at 08:53

## 2019-03-25 RX ADMIN — SODIUM CHLORIDE: 9 INJECTION, SOLUTION INTRAVENOUS at 15:12

## 2019-03-25 RX ADMIN — PANTOPRAZOLE SODIUM 40 MG: 40 TABLET, DELAYED RELEASE ORAL at 15:12

## 2019-03-25 RX ADMIN — ONDANSETRON 4 MG: 2 INJECTION INTRAMUSCULAR; INTRAVENOUS at 05:04

## 2019-03-25 RX ADMIN — DOCUSATE SODIUM 100 MG: 100 CAPSULE, LIQUID FILLED ORAL at 08:53

## 2019-03-25 RX ADMIN — OXYCODONE HYDROCHLORIDE 7.5 MG: 5 TABLET ORAL at 15:07

## 2019-03-25 RX ADMIN — OXYCODONE HYDROCHLORIDE 7.5 MG: 5 TABLET ORAL at 08:49

## 2019-03-25 RX ADMIN — OXYCODONE HYDROCHLORIDE 7.5 MG: 5 TABLET ORAL at 21:12

## 2019-03-25 RX ADMIN — INSULIN LISPRO 6 UNITS: 100 INJECTION, SOLUTION INTRAVENOUS; SUBCUTANEOUS at 02:19

## 2019-03-25 RX ADMIN — OXYCODONE HYDROCHLORIDE 7.5 MG: 5 TABLET ORAL at 00:17

## 2019-03-25 RX ADMIN — PROMETHAZINE HYDROCHLORIDE 6.25 MG: 25 INJECTION INTRAMUSCULAR; INTRAVENOUS at 06:23

## 2019-03-25 RX ADMIN — HYDRALAZINE HYDROCHLORIDE 50 MG: 50 TABLET ORAL at 13:29

## 2019-03-25 RX ADMIN — INSULIN LISPRO 3 UNITS: 100 INJECTION, SOLUTION INTRAVENOUS; SUBCUTANEOUS at 20:54

## 2019-03-25 RX ADMIN — PANTOPRAZOLE SODIUM 40 MG: 40 TABLET, DELAYED RELEASE ORAL at 06:00

## 2019-03-25 RX ADMIN — VANCOMYCIN HYDROCHLORIDE 1500 MG: 5 INJECTION, POWDER, LYOPHILIZED, FOR SOLUTION INTRAVENOUS at 05:07

## 2019-03-25 RX ADMIN — LINAGLIPTIN 5 MG: 5 TABLET, FILM COATED ORAL at 08:53

## 2019-03-25 RX ADMIN — LISINOPRIL 2.5 MG: 2.5 TABLET ORAL at 08:53

## 2019-03-25 RX ADMIN — BACLOFEN 10 MG: 10 TABLET ORAL at 08:53

## 2019-03-25 RX ADMIN — SODIUM CHLORIDE, PRESERVATIVE FREE 10 ML: 5 INJECTION INTRAVENOUS at 08:54

## 2019-03-25 RX ADMIN — HYDRALAZINE HYDROCHLORIDE 50 MG: 50 TABLET ORAL at 20:51

## 2019-03-25 RX ADMIN — MORPHINE SULFATE 2 MG: 4 INJECTION INTRAVENOUS at 05:04

## 2019-03-25 RX ADMIN — TAZOBACTAM SODIUM AND PIPERACILLIN SODIUM 3.38 G: 375; 3 INJECTION, SOLUTION INTRAVENOUS at 20:51

## 2019-03-25 RX ADMIN — ASPIRIN 81 MG 81 MG: 81 TABLET ORAL at 08:53

## 2019-03-25 RX ADMIN — INSULIN LISPRO 3 UNITS: 100 INJECTION, SOLUTION INTRAVENOUS; SUBCUTANEOUS at 08:55

## 2019-03-25 RX ADMIN — Medication: at 02:31

## 2019-03-25 RX ADMIN — INSULIN GLARGINE 40 UNITS: 100 INJECTION, SOLUTION SUBCUTANEOUS at 20:55

## 2019-03-25 RX ADMIN — INSULIN LISPRO 25 UNITS: 100 INJECTION, SOLUTION INTRAVENOUS; SUBCUTANEOUS at 13:27

## 2019-03-25 RX ADMIN — SODIUM CHLORIDE: 9 INJECTION, SOLUTION INTRAVENOUS at 00:19

## 2019-03-25 RX ADMIN — TAZOBACTAM SODIUM AND PIPERACILLIN SODIUM 3.38 G: 375; 3 INJECTION, SOLUTION INTRAVENOUS at 15:12

## 2019-03-25 ASSESSMENT — PAIN SCALES - GENERAL
PAINLEVEL_OUTOF10: 7
PAINLEVEL_OUTOF10: 5
PAINLEVEL_OUTOF10: 7
PAINLEVEL_OUTOF10: 7
PAINLEVEL_OUTOF10: 8
PAINLEVEL_OUTOF10: 7
PAINLEVEL_OUTOF10: 4
PAINLEVEL_OUTOF10: 7

## 2019-03-25 ASSESSMENT — PAIN DESCRIPTION - LOCATION
LOCATION: FOOT
LOCATION: FOOT

## 2019-03-25 ASSESSMENT — PAIN DESCRIPTION - PROGRESSION
CLINICAL_PROGRESSION: NOT CHANGED

## 2019-03-25 ASSESSMENT — PAIN DESCRIPTION - ONSET: ONSET: ON-GOING

## 2019-03-25 ASSESSMENT — PAIN DESCRIPTION - PAIN TYPE
TYPE: ACUTE PAIN
TYPE: SURGICAL PAIN

## 2019-03-25 ASSESSMENT — PAIN DESCRIPTION - ORIENTATION
ORIENTATION: RIGHT
ORIENTATION: RIGHT

## 2019-03-25 ASSESSMENT — PAIN DESCRIPTION - DESCRIPTORS: DESCRIPTORS: ACHING

## 2019-03-25 ASSESSMENT — PAIN DESCRIPTION - FREQUENCY: FREQUENCY: CONTINUOUS

## 2019-03-25 NOTE — PROGRESS NOTES
Pt had 2 episodes of vomiting and Diarrhea. Gave Zofran and pt stated to feel better. Pt became dizzy while sitting up. BG was 153.   Will continue to monitor

## 2019-03-25 NOTE — PROGRESS NOTES
HOSPITALIST PROGRESS NOTE  Date: 3/25/2019   Name: Debra Partida   MRN: [id number]   YOB: 1975      Subjective/Interval Hx:   Patient said he tolerated the amputation of his fifth toe yesterday without any problems awaiting to be evaluated by podiatry and then if it's okay he can be cleared to be discharged when sensitivities back for oral atbx    Objective:   Physical Exam:   BP (!) 186/97   Pulse 78   Temp 97.9 °F (36.6 °C) (Oral)   Resp 16   Ht 5' 9\" (1.753 m)   Wt 267 lb 9.6 oz (121.4 kg)   SpO2 93%   BMI 39.52 kg/m²   General: no acute distress, well nourished and well hydrated  HEENT: NCAT  Heart: S1S2 RRR  Lungs: Clear to ascultation bilaterally, respiratory effort normal  Abdomen: soft, NT/ND, positive bowel sounds  Extremities: no pitting edema, nontender ; necrosis to the right second toe and fifth toe  Neuro: patient is awake, alert and orientated times 3, no gross deficits  Skin: no rashes or ecchymosis        Meds:   Meds:    metFORMIN  1,000 mg Oral BID WC    insulin glargine  40 Units Subcutaneous Nightly    insulin lispro  0-18 Units Subcutaneous 2 times per day    vancomycin  1,500 mg Intravenous Q12H    docusate sodium  100 mg Oral BID    insulin lispro  25 Units Subcutaneous TID WC    linagliptin  5 mg Oral Daily    insulin lispro  0-18 Units Subcutaneous TID WC    piperacillin-tazobactam  3.375 g Intravenous Q6H    aspirin  81 mg Oral Daily    baclofen  10 mg Oral BID    diclofenac  1 patch Transdermal BID    hydrALAZINE  50 mg Oral TID    lisinopril  2.5 mg Oral Daily    pantoprazole  40 mg Oral BID AC    sodium chloride flush  10 mL Intravenous 2 times per day    enoxaparin  40 mg Subcutaneous Daily      Infusions:    dextrose      sodium chloride 100 mL/hr at 03/25/19 0019     PRN Meds:     promethazine 6.25 mg Q6H PRN   ondansetron 4 mg Q6H PRN   sodium chloride flush 10 mL PRN   magnesium hydroxide 30 mL Daily PRN   glucose 15 g PRN   dextrose 12.5 g PRN   glucagon (rDNA) 1 mg PRN   dextrose 100 mL/hr PRN   acetaminophen 650 mg Q4H PRN   oxyCODONE 7.5 mg Q6H PRN       Data/Labs:     No results for input(s): WBC, HGB, HCT, PLT in the last 72 hours. Recent Labs     03/23/19  0609 03/24/19  0752   CREATININE 1.1 1.3     No results for input(s): AST, ALT, ALB, BILIDIR, BILITOT, ALKPHOS in the last 72 hours. No results for input(s): INR in the last 72 hours. No results for input(s): CKTOTAL, CKMB, CKMBINDEX, TROPONINT in the last 72 hours. I/O last 3 completed shifts: In: 2896 [P.O.:300; I.V.:2596]  Out: 1850 [Urine:1850]    Intake/Output Summary (Last 24 hours) at 3/25/2019 1053  Last data filed at 3/24/2019 1929  Gross per 24 hour   Intake 2596 ml   Output 850 ml   Net 1746 ml        Assessment/Plan:    diabetic foot infection/Ulceration to Right 5th MTP and Left 2nd MTP  - concern about OM  - admit, IV Vanc, zosyn, consult podiatry, NPO  - pain control.  Doppler neg for DVT.  03/22/19- is followed by podiatry who plans on amputation if indicated today or tomorrow  03/25/19-patient tolerated amputation of the right fifth toe without any problems is being followed by podiatry     > DM type 2 uncontrolled  - lantus, SSI, consult endo     > dizziness, dyspnea episodes  - BP and CXR WNL  - check echo, cardiac tele, IVF     Hypertension-lisinopril, hydralazine, monitor blood pressure      DVT Prophylaxis: lovenox  Diet: DIET CARB CONTROL;  Code Status: Full Code    Dispo: when stable; when C/S is back for correct oral atbx or IV; probably tomorrow    Electronically signed by Neel Agosto MD on 3/25/2019 at 10:53 AM

## 2019-03-25 NOTE — PROGRESS NOTES
Progress Note( Dr. Trenton Rivas)  3/25/2019  Subjective:   Admit Date: 3/21/2019  PCP: Mac Flores MD    Admitted For :  Right and left foot ulcers     Consulted For: better control of DM     Interval History: Had  Amputation of Right 5 th toe /  C/O severe pain    Denies any chest pains,   Denies SOB . Denies nausea or vomiting. No new bowel or bladder symptoms.        Intake/Output Summary (Last 24 hours) at 3/25/2019 0605  Last data filed at 3/24/2019 1929  Gross per 24 hour   Intake 2896 ml   Output 1850 ml   Net 1046 ml       DATA    CBC:   Recent Labs     03/22/19  0825   WBC 5.6   HGB 12.3*       CMP:  Recent Labs     03/22/19  0825 03/23/19  0609 03/24/19  0752     --   --    K 4.0  --   --      --   --    CO2 24  --   --    BUN 13  --   --    CREATININE 0.9 1.1 1.3   CALCIUM 7.6*  --   --      Lipids:   Lab Results   Component Value Date    CHOL 173 07/24/2017    CHOL 168 10/15/2013    HDL 35 07/24/2017    TRIG 231 07/24/2017     Glucose:  Recent Labs     03/24/19  2114 03/25/19  0216 03/25/19  0523   POCGLU 215* 211* 152*     [id number]:  Lab Results   Component Value Date    LABA1C 8.7 03/21/2019     High Sensitivity TSH:   Lab Results   Component Value Date    TSHHS 2.300 07/24/2017     Free T3: No results found for: FT3  Free T4:  Lab Results   Component Value Date    T4FREE 1.25 07/24/2017             Scheduled Medicines   Medications:    metFORMIN  1,000 mg Oral BID WC    insulin glargine  40 Units Subcutaneous Nightly    insulin lispro  0-18 Units Subcutaneous 2 times per day    vancomycin  1,500 mg Intravenous Q12H    docusate sodium  100 mg Oral BID    insulin lispro  25 Units Subcutaneous TID WC    linagliptin  5 mg Oral Daily    insulin lispro  0-18 Units Subcutaneous TID WC    piperacillin-tazobactam  3.375 g Intravenous Q6H    aspirin  81 mg Oral Daily    baclofen  10 mg Oral BID    diclofenac  1 patch Transdermal BID    hydrALAZINE  50 mg Oral TID    lisinopril  2.5 mg Oral Daily    pantoprazole  40 mg Oral BID AC    sodium chloride flush  10 mL Intravenous 2 times per day    enoxaparin  40 mg Subcutaneous Daily      Infusions:    dextrose      sodium chloride 100 mL/hr at 03/25/19 0019         Objective:   Vitals: BP (!) 154/81   Pulse 78   Temp 98.2 °F (36.8 °C) (Oral)   Resp 18   Ht 5' 9\" (1.753 m)   Wt 268 lb 1.6 oz (121.6 kg)   SpO2 98%   BMI 39.59 kg/m²   General appearance: alert and cooperative with exam  Neck: no JVD or bruit  Thyroid : Normal lobes   Lungs: Has Vesicular Breath sounds   Heart:  regular rate and rhythm  Abdomen: soft, non-tender; bowel sounds normal; no masses,  no organomegaly  Musculoskeletal: Normal  Extremities: extremities normal, , no edemaRight 5 Th amputated   Neurologic:  Awake, alert, oriented to name, place and time. Cranial nerves II-XII are grossly intact. Motor is  intact. Sensory is neuropathy . ,  and gait is abnormal.    Assessment:     Patient Active Problem List:     Hiatal hernia     Diabetes mellitus type 2, improved controlled     Diabetic neuropathy (HCC)     Panic attack     Anxiety associated with depression     Neck pain     DANIELA (obstructive sleep apnea)     Urinary frequency     Bilateral carpal tunnel syndrome- left worse than right     Hyperlipidemia with target LDL less than 100     HTN, goal below 140/90     Bronchitis     Osteomyelitis (HCC)     Diabetic ulcer of left midfoot (HCC)     WD-Diabetic ulcer of left midfoot associated with type 2 diabetes mellitus, with fat layer exposed ([address]     Abscess     Periumbilical hernia     WD-Wound, surgical, infected, initial encounter     Sepsis ([address]     Cellulitis of left foot     Constipation     Intractable nausea and vomiting     Uncontrolled type 2 diabetes mellitus (HCC)     Nausea and vomiting     Diabetic foot infection ([address]      Plan:     1. Reviewed POC blood glucose . Labs and X ray results   2. Reviewed Current Medicines   3.  On meal/

## 2019-03-25 NOTE — PROGRESS NOTES
[address] [facility]  consulted by Dr. Pam Cabello for monitoring and adjustment. Indication for treatment: Diabetic Foot Infection  Goal trough: 15-20 mcg/mL (concern for OM)     Pertinent Laboratory Values:   Temp Readings from Last 3 Encounters:   03/25/19 97.9 °F (36.6 °C) (Oral)   03/23/19 98.6 °F (37 °C)   01/12/19 98 °F (36.7 °C) (Oral)     No results for input(s): WBC, LACTATE in the last 72 hours. Recent Labs     03/23/19  0609 03/24/19  0752 03/25/19  1030   CREATININE 1.1 1.3 1.2     Estimated Creatinine Clearance: 102 mL/min (based on SCr of 1.2 mg/dL). Intake/Output Summary (Last 24 hours) at 3/25/2019 1335  Last data filed at 3/24/2019 1929  Gross per 24 hour   Intake 2596 ml   Output 850 ml   Net 1746 ml       Pertinent Cultures:  Date    Source    Results  3/23   Surgical   MSSA, Staph Species (pending ID)    Vancomycin level:   TROUGH:    Recent Labs     03/22/19  1401 03/24/19  0752   VANCOTROUGH 16.6 23.6*     RANDOM:  No results for input(s): VANCORANDOM in the last 72 hours. Assessment:  · WBC and temperature: No new WBC. Patient is afebrile. · SCr, BUN, and urine output: 0.9 -> 1.3 -> 1.2, slight improvement  · Day(s) of therapy: #5  · Vancomycin level: 23.6 mcg/mL on 3/24. Plan:  · Given level and renal trends, vancomycin dosing was reduced yesterday. · Renal labs noted with slight improving trend. Current dosing appropriate to continue: vancomycin 1500 mg IVPB every 12 hours. · Renal labs will continue to be followed closely given hx diabetes and concurrent Zosyn. · Plan to obtain next trough level: 03/26/19 @ 0430. · Pharmacy will continue to monitor patient and adjust therapy as indicated.     Thank you for the consult,    Meliza Galarza, YeisonD, [facility]

## 2019-03-26 VITALS
OXYGEN SATURATION: 98 % | BODY MASS INDEX: 39.63 KG/M2 | TEMPERATURE: 97.7 F | WEIGHT: 267.6 LBS | DIASTOLIC BLOOD PRESSURE: 93 MMHG | HEART RATE: 79 BPM | RESPIRATION RATE: 18 BRPM | HEIGHT: 69 IN | SYSTOLIC BLOOD PRESSURE: 151 MMHG

## 2019-03-26 LAB
CREAT SERPL-MCNC: 1.4 MG/DL (ref 0.9–1.3)
CULTURE: ABNORMAL
DOSE AMOUNT: NORMAL
DOSE TIME: NORMAL
GFR AFRICAN AMERICAN: >60 ML/MIN/1.73M2
GFR NON-AFRICAN AMERICAN: 55 ML/MIN/1.73M2
GLUCOSE BLD-MCNC: 126 MG/DL (ref 70–99)
GLUCOSE BLD-MCNC: 146 MG/DL (ref 70–99)
GLUCOSE BLD-MCNC: 245 MG/DL (ref 70–99)
GRAM SMEAR: ABNORMAL
GRAM SMEAR: ABNORMAL
Lab: ABNORMAL
SPECIMEN: ABNORMAL
VANCOMYCIN TROUGH: 18.1 UG/ML (ref 10–20)

## 2019-03-26 PROCEDURE — 36415 COLL VENOUS BLD VENIPUNCTURE: CPT

## 2019-03-26 PROCEDURE — 6370000000 HC RX 637 (ALT 250 FOR IP): Performed by: PODIATRIST

## 2019-03-26 PROCEDURE — 94761 N-INVAS EAR/PLS OXIMETRY MLT: CPT

## 2019-03-26 PROCEDURE — 6360000002 HC RX W HCPCS: Performed by: PODIATRIST

## 2019-03-26 PROCEDURE — 6370000000 HC RX 637 (ALT 250 FOR IP): Performed by: INTERNAL MEDICINE

## 2019-03-26 PROCEDURE — 82962 GLUCOSE BLOOD TEST: CPT

## 2019-03-26 PROCEDURE — 2580000003 HC RX 258: Performed by: PODIATRIST

## 2019-03-26 PROCEDURE — 82565 ASSAY OF CREATININE: CPT

## 2019-03-26 PROCEDURE — 6360000002 HC RX W HCPCS: Performed by: HOSPITALIST

## 2019-03-26 PROCEDURE — 2580000003 HC RX 258: Performed by: HOSPITALIST

## 2019-03-26 PROCEDURE — 80202 ASSAY OF VANCOMYCIN: CPT

## 2019-03-26 RX ORDER — HYDRALAZINE HYDROCHLORIDE 50 MG/1
50 TABLET, FILM COATED ORAL 3 TIMES DAILY
Qty: 90 TABLET | Refills: 0 | Status: ON HOLD | OUTPATIENT
Start: 2019-03-26 | End: 2021-04-04 | Stop reason: ALTCHOICE

## 2019-03-26 RX ORDER — SULFAMETHOXAZOLE AND TRIMETHOPRIM 800; 160 MG/1; MG/1
1 TABLET ORAL 2 TIMES DAILY
Qty: 20 TABLET | Refills: 0 | Status: SHIPPED | OUTPATIENT
Start: 2019-03-26 | End: 2019-04-05

## 2019-03-26 RX ORDER — INSULIN GLARGINE 100 [IU]/ML
40 INJECTION, SOLUTION SUBCUTANEOUS NIGHTLY
Qty: 1 VIAL | Refills: 0 | Status: ON HOLD | OUTPATIENT
Start: 2019-03-26 | End: 2021-03-29 | Stop reason: SDUPTHER

## 2019-03-26 RX ADMIN — OXYCODONE HYDROCHLORIDE 7.5 MG: 5 TABLET ORAL at 04:19

## 2019-03-26 RX ADMIN — DICLOFENAC EPOLAMINE 1 PATCH: 0.01 PATCH TOPICAL at 09:23

## 2019-03-26 RX ADMIN — LINAGLIPTIN 5 MG: 5 TABLET, FILM COATED ORAL at 08:57

## 2019-03-26 RX ADMIN — METFORMIN HYDROCHLORIDE 1000 MG: 500 TABLET ORAL at 08:57

## 2019-03-26 RX ADMIN — LISINOPRIL 2.5 MG: 2.5 TABLET ORAL at 08:57

## 2019-03-26 RX ADMIN — TAZOBACTAM SODIUM AND PIPERACILLIN SODIUM 3.38 G: 375; 3 INJECTION, SOLUTION INTRAVENOUS at 09:53

## 2019-03-26 RX ADMIN — OXYCODONE HYDROCHLORIDE 7.5 MG: 5 TABLET ORAL at 11:15

## 2019-03-26 RX ADMIN — TAZOBACTAM SODIUM AND PIPERACILLIN SODIUM 3.38 G: 375; 3 INJECTION, SOLUTION INTRAVENOUS at 03:21

## 2019-03-26 RX ADMIN — BACLOFEN 10 MG: 10 TABLET ORAL at 08:57

## 2019-03-26 RX ADMIN — ONDANSETRON 4 MG: 2 INJECTION INTRAMUSCULAR; INTRAVENOUS at 01:21

## 2019-03-26 RX ADMIN — VANCOMYCIN HYDROCHLORIDE 1500 MG: 5 INJECTION, POWDER, LYOPHILIZED, FOR SOLUTION INTRAVENOUS at 06:43

## 2019-03-26 RX ADMIN — HYDRALAZINE HYDROCHLORIDE 50 MG: 50 TABLET ORAL at 05:50

## 2019-03-26 RX ADMIN — SODIUM CHLORIDE: 9 INJECTION, SOLUTION INTRAVENOUS at 05:50

## 2019-03-26 RX ADMIN — ENOXAPARIN SODIUM 40 MG: 40 INJECTION SUBCUTANEOUS at 08:57

## 2019-03-26 RX ADMIN — PANTOPRAZOLE SODIUM 40 MG: 40 TABLET, DELAYED RELEASE ORAL at 05:50

## 2019-03-26 RX ADMIN — PROMETHAZINE HYDROCHLORIDE 6.25 MG: 25 INJECTION INTRAMUSCULAR; INTRAVENOUS at 04:24

## 2019-03-26 RX ADMIN — ASPIRIN 81 MG 81 MG: 81 TABLET ORAL at 08:57

## 2019-03-26 RX ADMIN — INSULIN LISPRO 6 UNITS: 100 INJECTION, SOLUTION INTRAVENOUS; SUBCUTANEOUS at 09:49

## 2019-03-26 ASSESSMENT — PAIN DESCRIPTION - ONSET: ONSET: ON-GOING

## 2019-03-26 ASSESSMENT — PAIN DESCRIPTION - PAIN TYPE: TYPE: CHRONIC PAIN

## 2019-03-26 ASSESSMENT — PAIN DESCRIPTION - ORIENTATION: ORIENTATION: RIGHT

## 2019-03-26 ASSESSMENT — PAIN SCALES - GENERAL
PAINLEVEL_OUTOF10: 8
PAINLEVEL_OUTOF10: 4
PAINLEVEL_OUTOF10: 7

## 2019-03-26 ASSESSMENT — PAIN DESCRIPTION - FREQUENCY: FREQUENCY: CONTINUOUS

## 2019-03-26 ASSESSMENT — PAIN DESCRIPTION - LOCATION: LOCATION: FOOT

## 2019-03-26 ASSESSMENT — PAIN DESCRIPTION - PROGRESSION: CLINICAL_PROGRESSION: GRADUALLY IMPROVING

## 2019-03-26 ASSESSMENT — PAIN DESCRIPTION - DESCRIPTORS: DESCRIPTORS: ACHING

## 2019-03-26 NOTE — PROGRESS NOTES
Progress Note( Dr. Marcia Gutierrez)  3/26/2019  Subjective:   Admit Date: 3/21/2019  PCP: Angelita Holter, MD    Admitted For :  Right and left foot ulcers     Consulted For: better control of DM     Interval History: Had  Amputation of Right 5 th toe /  C/O severe pain    Denies any chest pains,   Denies SOB . Denies nausea or vomiting. No new bowel or bladder symptoms. Intake/Output Summary (Last 24 hours) at 3/26/2019 0556  Last data filed at 3/26/2019 0549  Gross per 24 hour   Intake 6544 ml   Output --   Net 6544 ml       DATA    CBC:   No results for input(s): WBC, HGB, PLT in the last 72 hours.  CMP:  Recent Labs     03/23/19  0609 03/24/19  0752 03/25/19  1030   CREATININE 1.1 1.3 1.2     Lipids:   Lab Results   Component Value Date    CHOL 173 07/24/2017    CHOL 168 10/15/2013    HDL 35 07/24/2017    TRIG 231 07/24/2017     Glucose:  Recent Labs     03/25/19  1829 03/25/19  2049 03/26/19  0202   POCGLU 198* 176* 126*     [id number]:  Lab Results   Component Value Date    LABA1C 8.7 03/21/2019     High Sensitivity TSH:   Lab Results   Component Value Date    TSHHS 2.300 07/24/2017     Free T3: No results found for: FT3  Free T4:  Lab Results   Component Value Date    T4FREE 1.25 07/24/2017             Scheduled Medicines   Medications:    metFORMIN  1,000 mg Oral BID WC    insulin glargine  40 Units Subcutaneous Nightly    insulin lispro  0-18 Units Subcutaneous 2 times per day    vancomycin  1,500 mg Intravenous Q12H    docusate sodium  100 mg Oral BID    insulin lispro  25 Units Subcutaneous TID WC    linagliptin  5 mg Oral Daily    insulin lispro  0-18 Units Subcutaneous TID WC    piperacillin-tazobactam  3.375 g Intravenous Q6H    aspirin  81 mg Oral Daily    baclofen  10 mg Oral BID    diclofenac  1 patch Transdermal BID    hydrALAZINE  50 mg Oral TID    lisinopril  2.5 mg Oral Daily    pantoprazole  40 mg Oral BID AC    sodium chloride flush  10 mL Intravenous 2 times per day    enoxaparin  40 mg Subcutaneous Daily      Infusions:    dextrose      sodium chloride 100 mL/hr at 03/26/19 0550         Objective:   Vitals: BP (!) 142/88   Pulse 81   Temp 98.3 °F (36.8 °C) (Oral)   Resp 16   Ht 5' 9\" (1.753 m)   Wt 267 lb 9.6 oz (121.4 kg)   SpO2 97%   BMI 39.52 kg/m²   General appearance: alert and cooperative with exam  Neck: no JVD or bruit  Thyroid : Normal lobes   Lungs: Has Vesicular Breath sounds   Heart:  regular rate and rhythm  Abdomen: soft, non-tender; bowel sounds normal; no masses,  no organomegaly  Musculoskeletal: Normal  Extremities: extremities normal, , no edemaRight 5 Th amputated   Neurologic:  Awake, alert, oriented to name, place and time. Cranial nerves II-XII are grossly intact. Motor is  intact. Sensory is neuropathy . ,  and gait is abnormal.    Assessment:     Patient Active Problem List:     Hiatal hernia     Diabetes mellitus type 2, improved controlled     Diabetic neuropathy (HCC)     Panic attack     Anxiety associated with depression     Neck pain     DANIELA (obstructive sleep apnea)     Urinary frequency     Bilateral carpal tunnel syndrome- left worse than right     Hyperlipidemia with target LDL less than 100     HTN, goal below 140/90     Bronchitis     Osteomyelitis (HCC)     Diabetic ulcer of left midfoot (HCC)     WD-Diabetic ulcer of left midfoot associated with type 2 diabetes mellitus, with fat layer exposed ([address]     Abscess     Periumbilical hernia     WD-Wound, surgical, infected, initial encounter     Sepsis ([address]     Cellulitis of left foot     Constipation     Intractable nausea and vomiting     Uncontrolled type 2 diabetes mellitus (HCC)     Nausea and vomiting     Diabetic foot infection ([address]      Plan:     1. Reviewed POC blood glucose . Labs and X ray results   2. Reviewed Current Medicines   3. On meal/ Correction bolus Humalog/ Basal Lantus Insulin regime / and Oral Hypoglycemic drugs   4.  Monitor Blood glucose frequently 5. Modified  the dose of Insulin/ other medicines as needed   6. Will follow   7. Possible discharge today     .      Kelsey Dumont MD

## 2019-03-27 LAB
CULTURE: ABNORMAL
EKG ATRIAL RATE: 89 BPM
EKG DIAGNOSIS: NORMAL
EKG P AXIS: 29 DEGREES
EKG P-R INTERVAL: 176 MS
EKG Q-T INTERVAL: 354 MS
EKG QRS DURATION: 82 MS
EKG QTC CALCULATION (BAZETT): 430 MS
EKG R AXIS: 24 DEGREES
EKG T AXIS: 54 DEGREES
EKG VENTRICULAR RATE: 89 BPM
GRAM SMEAR: ABNORMAL
GRAM SMEAR: ABNORMAL
Lab: ABNORMAL
SPECIMEN: ABNORMAL

## 2019-04-17 ENCOUNTER — HOSPITAL ENCOUNTER (EMERGENCY)
Age: 44
Discharge: HOME | End: 2019-04-17
Payer: COMMERCIAL

## 2019-04-17 VITALS
TEMPERATURE: 98.4 F | DIASTOLIC BLOOD PRESSURE: 72 MMHG | RESPIRATION RATE: 16 BRPM | SYSTOLIC BLOOD PRESSURE: 122 MMHG | HEIGHT: 69 IN | OXYGEN SATURATION: 99 % | WEIGHT: 265 LBS | HEART RATE: 90 BPM | BODY MASS INDEX: 39.25 KG/M2

## 2019-04-17 DIAGNOSIS — M54.50 BILATERAL LOW BACK PAIN WITHOUT SCIATICA, UNSPECIFIED CHRONICITY: ICD-10-CM

## 2019-04-17 DIAGNOSIS — M79.642 LEFT HAND PAIN: ICD-10-CM
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
## 2019-04-17 DIAGNOSIS — R51.9 ACUTE NONINTRACTABLE HEADACHE, UNSPECIFIED HEADACHE TYPE: ICD-10-CM

## 2019-04-17 PROCEDURE — 99284 EMERGENCY DEPT VISIT MOD MDM: CPT

## 2019-04-17 ASSESSMENT — PAIN SCALES - GENERAL: PAINLEVEL_OUTOF10: 7

## 2019-04-17 ASSESSMENT — PAIN DESCRIPTION - PAIN TYPE: TYPE: ACUTE PAIN

## 2019-04-17 ASSESSMENT — PAIN DESCRIPTION - ORIENTATION: ORIENTATION: LOWER

## 2019-04-17 ASSESSMENT — PAIN DESCRIPTION - LOCATION: LOCATION: BACK;HEAD

## 2019-04-17 ASSESSMENT — PAIN DESCRIPTION - DESCRIPTORS: DESCRIPTORS: ACHING;THROBBING
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
## 2019-08-04 ENCOUNTER — HOSPITAL ENCOUNTER (INPATIENT)
Age: 44
LOS: 4 days | Discharge: HOME | DRG: 314 | End: 2019-08-08
Attending: EMERGENCY MEDICINE | Admitting: FAMILY MEDICINE
Payer: COMMERCIAL

## 2019-08-04 ENCOUNTER — APPOINTMENT (OUTPATIENT)
Dept: ULTRASOUND IMAGING | Age: 44
DRG: 314 | End: 2019-08-04
Payer: COMMERCIAL

## 2019-08-04 ENCOUNTER — APPOINTMENT (OUTPATIENT)
Dept: GENERAL RADIOLOGY | Age: 44
DRG: 314 | End: 2019-08-04
Payer: COMMERCIAL

## 2019-08-04 DIAGNOSIS — E11.621 DIABETIC ULCER OF TOE OF RIGHT FOOT ASSOCIATED WITH TYPE 2 DIABETES MELLITUS, UNSPECIFIED ULCER STAGE (HCC): ICD-10-CM

## 2019-08-04 DIAGNOSIS — R73.9 HYPERGLYCEMIA: Primary | ICD-10-CM

## 2019-08-04 DIAGNOSIS — N17.9 ACUTE RENAL FAILURE SUPERIMPOSED ON CHRONIC KIDNEY DISEASE, UNSPECIFIED CKD STAGE, UNSPECIFIED ACUTE RENAL FAILURE TYPE (HCC): ICD-10-CM

## 2019-08-04 DIAGNOSIS — R59.0 INGUINAL LYMPHADENOPATHY: ICD-10-CM

## 2019-08-04 DIAGNOSIS — N18.9 ACUTE RENAL FAILURE SUPERIMPOSED ON CHRONIC KIDNEY DISEASE, UNSPECIFIED CKD STAGE, UNSPECIFIED ACUTE RENAL FAILURE TYPE (HCC): ICD-10-CM

## 2019-08-04 DIAGNOSIS — L97.519 DIABETIC ULCER OF TOE OF RIGHT FOOT ASSOCIATED WITH TYPE 2 DIABETES MELLITUS, UNSPECIFIED ULCER STAGE (HCC): ICD-10-CM

## 2019-08-04 DIAGNOSIS — L03.115 CELLULITIS OF RIGHT LOWER EXTREMITY: ICD-10-CM

## 2019-08-04 LAB
ALBUMIN SERPL-MCNC: 3.3 GM/DL (ref 3.4–5)
ALP BLD-CCNC: 69 IU/L (ref 40–129)
ALT SERPL-CCNC: 17 U/L (ref 10–40)
ANION GAP SERPL CALCULATED.3IONS-SCNC: 11 MMOL/L (ref 4–16)
AST SERPL-CCNC: 11 IU/L (ref 15–37)
BACTERIA: NEGATIVE /HPF
BASE EXCESS: ABNORMAL (ref 0–3.3)
BASOPHILS ABSOLUTE: 0 K/CU MM
BASOPHILS RELATIVE PERCENT: 0.3 % (ref 0–1)
BETA-HYDROXYBUTYRATE: 1.6 MG/DL (ref 0–3)
BILIRUB SERPL-MCNC: 0.3 MG/DL (ref 0–1)
BILIRUBIN URINE: NEGATIVE MG/DL
BLOOD, URINE: ABNORMAL
BUN BLDV-MCNC: 32 MG/DL (ref 6–23)
CALCIUM SERPL-MCNC: 8.5 MG/DL (ref 8.3–10.6)
CHLORIDE BLD-SCNC: 97 MMOL/L (ref 99–110)
CHP ED QC CHECK: YES
CLARITY: CLEAR
CO2: 22 MMOL/L (ref 21–32)
COLOR: YELLOW
COMMENT: ABNORMAL
CREAT SERPL-MCNC: 1.6 MG/DL (ref 0.9–1.3)
DIFFERENTIAL TYPE: ABNORMAL
EOSINOPHILS ABSOLUTE: 0.1 K/CU MM
EOSINOPHILS RELATIVE PERCENT: 0.9 % (ref 0–3)
GFR AFRICAN AMERICAN: 57 ML/MIN/1.73M2
GFR NON-AFRICAN AMERICAN: 47 ML/MIN/1.73M2
GLUCOSE BLD-MCNC: 339 MG/DL (ref 70–99)
GLUCOSE BLD-MCNC: 358 MG/DL
GLUCOSE BLD-MCNC: 358 MG/DL (ref 70–99)
GLUCOSE, URINE: >500 MG/DL
HCO3 VENOUS: 24.3 MMOL/L (ref 19–25)
HCT VFR BLD CALC: 34.5 % (ref 42–52)
HEMOGLOBIN: 11.9 GM/DL (ref 13.5–18)
HYALINE CASTS: 0 /LPF
IMMATURE NEUTROPHIL %: 0.5 % (ref 0–0.43)
KETONES, URINE: NEGATIVE MG/DL
LACTATE: 1.8 MMOL/L (ref 0.4–2)
LEUKOCYTE ESTERASE, URINE: NEGATIVE
LYMPHOCYTES ABSOLUTE: 1.6 K/CU MM
LYMPHOCYTES RELATIVE PERCENT: 12.4 % (ref 24–44)
MCH RBC QN AUTO: 31 PG (ref 27–31)
MCHC RBC AUTO-ENTMCNC: 34.5 % (ref 32–36)
MCV RBC AUTO: 89.8 FL (ref 78–100)
MONOCYTES ABSOLUTE: 1 K/CU MM
MONOCYTES RELATIVE PERCENT: 8.1 % (ref 0–4)
MUCUS: ABNORMAL HPF
NITRITE URINE, QUANTITATIVE: NEGATIVE
NUCLEATED RBC %: 0 %
O2 SAT, VEN: 88.3 % (ref 50–70)
PCO2, VEN: 42 MMHG (ref 38–52)
PDW BLD-RTO: 12 % (ref 11.7–14.9)
PH VENOUS: 7.37 (ref 7.32–7.42)
PH, URINE: 5 (ref 5–8)
PLATELET # BLD: 201 K/CU MM (ref 140–440)
PMV BLD AUTO: 10.8 FL (ref 7.5–11.1)
PO2, VEN: 73 MMHG (ref 28–48)
POTASSIUM SERPL-SCNC: 4.3 MMOL/L (ref 3.5–5.1)
PROTEIN UA: >500 MG/DL
RBC # BLD: 3.84 M/CU MM (ref 4.6–6.2)
RBC URINE: <1 /HPF (ref 0–3)
SEGMENTED NEUTROPHILS ABSOLUTE COUNT: 10 K/CU MM
SEGMENTED NEUTROPHILS RELATIVE PERCENT: 77.8 % (ref 36–66)
SODIUM BLD-SCNC: 130 MMOL/L (ref 135–145)
SPECIFIC GRAVITY UA: 1.02 (ref 1–1.03)
TOTAL CK: 73 IU/L (ref 38–174)
TOTAL IMMATURE NEUTOROPHIL: 0.06 K/CU MM
TOTAL NUCLEATED RBC: 0 K/CU MM
TOTAL PROTEIN: 6.8 GM/DL (ref 6.4–8.2)
TRICHOMONAS: ABNORMAL /HPF
UROBILINOGEN, URINE: NORMAL MG/DL (ref 0.2–1)
WBC # BLD: 12.9 K/CU MM (ref 4–10.5)
WBC UA: 1 /HPF (ref 0–2)

## 2019-08-04 PROCEDURE — 2500000003 HC RX 250 WO HCPCS: Performed by: EMERGENCY MEDICINE

## 2019-08-04 PROCEDURE — 82962 GLUCOSE BLOOD TEST: CPT

## 2019-08-04 PROCEDURE — 83605 ASSAY OF LACTIC ACID: CPT

## 2019-08-04 PROCEDURE — 93971 EXTREMITY STUDY: CPT

## 2019-08-04 PROCEDURE — 85025 COMPLETE CBC W/AUTO DIFF WBC: CPT

## 2019-08-04 PROCEDURE — 6360000002 HC RX W HCPCS: Performed by: EMERGENCY MEDICINE

## 2019-08-04 PROCEDURE — 99285 EMERGENCY DEPT VISIT HI MDM: CPT

## 2019-08-04 PROCEDURE — 82805 BLOOD GASES W/O2 SATURATION: CPT

## 2019-08-04 PROCEDURE — 81001 URINALYSIS AUTO W/SCOPE: CPT

## 2019-08-04 PROCEDURE — 80053 COMPREHEN METABOLIC PANEL: CPT

## 2019-08-04 PROCEDURE — 82010 KETONE BODYS QUAN: CPT

## 2019-08-04 PROCEDURE — 1200000000 HC SEMI PRIVATE

## 2019-08-04 PROCEDURE — 73630 X-RAY EXAM OF FOOT: CPT

## 2019-08-04 PROCEDURE — 96375 TX/PRO/DX INJ NEW DRUG ADDON: CPT

## 2019-08-04 PROCEDURE — 96365 THER/PROPH/DIAG IV INF INIT: CPT

## 2019-08-04 PROCEDURE — 87040 BLOOD CULTURE FOR BACTERIA: CPT

## 2019-08-04 PROCEDURE — 2580000003 HC RX 258: Performed by: EMERGENCY MEDICINE

## 2019-08-04 PROCEDURE — 82550 ASSAY OF CK (CPK): CPT

## 2019-08-04 RX ORDER — ONDANSETRON 2 MG/ML
4 INJECTION INTRAMUSCULAR; INTRAVENOUS EVERY 30 MIN PRN
Status: DISCONTINUED | OUTPATIENT
Start: 2019-08-04 | End: 2019-08-05

## 2019-08-04 RX ORDER — SODIUM CHLORIDE 9 MG/ML
INJECTION, SOLUTION INTRAVENOUS CONTINUOUS
Status: DISCONTINUED | OUTPATIENT
Start: 2019-08-04 | End: 2019-08-08 | Stop reason: HOSPADM

## 2019-08-04 RX ORDER — MORPHINE SULFATE 4 MG/ML
4 INJECTION, SOLUTION INTRAMUSCULAR; INTRAVENOUS ONCE
Status: COMPLETED | OUTPATIENT
Start: 2019-08-04 | End: 2019-08-04

## 2019-08-04 RX ORDER — 0.9 % SODIUM CHLORIDE 0.9 %
1000 INTRAVENOUS SOLUTION INTRAVENOUS ONCE
Status: COMPLETED | OUTPATIENT
Start: 2019-08-04 | End: 2019-08-04

## 2019-08-04 RX ADMIN — DEXTROSE MONOHYDRATE 600 MG: 5 INJECTION, SOLUTION INTRAVENOUS at 22:18

## 2019-08-04 RX ADMIN — ONDANSETRON 4 MG: 2 INJECTION INTRAMUSCULAR; INTRAVENOUS at 20:51

## 2019-08-04 RX ADMIN — MORPHINE SULFATE 4 MG: 4 INJECTION, SOLUTION INTRAMUSCULAR; INTRAVENOUS at 20:51

## 2019-08-04 RX ADMIN — SODIUM CHLORIDE 1000 ML: 9 INJECTION, SOLUTION INTRAVENOUS at 22:19

## 2019-08-04 ASSESSMENT — PAIN SCALES - GENERAL
PAINLEVEL_OUTOF10: 7
PAINLEVEL_OUTOF10: 9
PAINLEVEL_OUTOF10: 9

## 2019-08-04 ASSESSMENT — PAIN DESCRIPTION - LOCATION
LOCATION: FOOT;LEG
LOCATION: FOOT;LEG

## 2019-08-04 ASSESSMENT — PAIN DESCRIPTION - PAIN TYPE
TYPE: ACUTE PAIN
TYPE: ACUTE PAIN

## 2019-08-04 ASSESSMENT — PAIN DESCRIPTION - ORIENTATION
ORIENTATION: RIGHT
ORIENTATION: RIGHT

## 2019-08-05 ENCOUNTER — APPOINTMENT (OUTPATIENT)
Dept: MRI IMAGING | Age: 44
DRG: 314 | End: 2019-08-05
Payer: COMMERCIAL

## 2019-08-05 LAB
ANION GAP SERPL CALCULATED.3IONS-SCNC: 9 MMOL/L (ref 4–16)
BUN BLDV-MCNC: 32 MG/DL (ref 6–23)
CALCIUM SERPL-MCNC: 8.5 MG/DL (ref 8.3–10.6)
CHLORIDE BLD-SCNC: 103 MMOL/L (ref 99–110)
CO2: 25 MMOL/L (ref 21–32)
CREAT SERPL-MCNC: 1.5 MG/DL (ref 0.9–1.3)
ESTIMATED AVERAGE GLUCOSE: 212 MG/DL
GFR AFRICAN AMERICAN: >60 ML/MIN/1.73M2
GFR NON-AFRICAN AMERICAN: 51 ML/MIN/1.73M2
GLUCOSE BLD-MCNC: 159 MG/DL (ref 70–99)
GLUCOSE BLD-MCNC: 194 MG/DL (ref 70–99)
GLUCOSE BLD-MCNC: 229 MG/DL (ref 70–99)
GLUCOSE BLD-MCNC: 265 MG/DL (ref 70–99)
GLUCOSE BLD-MCNC: 317 MG/DL (ref 70–99)
GLUCOSE BLD-MCNC: 335 MG/DL (ref 70–99)
GLUCOSE BLD-MCNC: 375 MG/DL (ref 70–99)
HBA1C MFR BLD: 9 % (ref 4.2–6.3)
HCT VFR BLD CALC: 36.7 % (ref 42–52)
HEMOGLOBIN: 12 GM/DL (ref 13.5–18)
MCH RBC QN AUTO: 30.7 PG (ref 27–31)
MCHC RBC AUTO-ENTMCNC: 32.7 % (ref 32–36)
MCV RBC AUTO: 93.9 FL (ref 78–100)
PDW BLD-RTO: 12 % (ref 11.7–14.9)
PLATELET # BLD: 199 K/CU MM (ref 140–440)
PMV BLD AUTO: 11 FL (ref 7.5–11.1)
POTASSIUM SERPL-SCNC: 4.7 MMOL/L (ref 3.5–5.1)
RBC # BLD: 3.91 M/CU MM (ref 4.6–6.2)
SODIUM BLD-SCNC: 137 MMOL/L (ref 135–145)
WBC # BLD: 11.6 K/CU MM (ref 4–10.5)

## 2019-08-05 PROCEDURE — 85027 COMPLETE CBC AUTOMATED: CPT

## 2019-08-05 PROCEDURE — 83036 HEMOGLOBIN GLYCOSYLATED A1C: CPT

## 2019-08-05 PROCEDURE — 1200000000 HC SEMI PRIVATE

## 2019-08-05 PROCEDURE — 6370000000 HC RX 637 (ALT 250 FOR IP): Performed by: FAMILY MEDICINE

## 2019-08-05 PROCEDURE — 36415 COLL VENOUS BLD VENIPUNCTURE: CPT

## 2019-08-05 PROCEDURE — 6360000002 HC RX W HCPCS: Performed by: FAMILY MEDICINE

## 2019-08-05 PROCEDURE — 73720 MRI LWR EXTREMITY W/O&W/DYE: CPT

## 2019-08-05 PROCEDURE — A9577 INJ MULTIHANCE: HCPCS | Performed by: PODIATRIST

## 2019-08-05 PROCEDURE — 80048 BASIC METABOLIC PNL TOTAL CA: CPT

## 2019-08-05 PROCEDURE — 94761 N-INVAS EAR/PLS OXIMETRY MLT: CPT

## 2019-08-05 PROCEDURE — 2580000003 HC RX 258: Performed by: FAMILY MEDICINE

## 2019-08-05 PROCEDURE — 82962 GLUCOSE BLOOD TEST: CPT

## 2019-08-05 PROCEDURE — 6360000004 HC RX CONTRAST MEDICATION: Performed by: PODIATRIST

## 2019-08-05 PROCEDURE — 2580000003 HC RX 258: Performed by: EMERGENCY MEDICINE

## 2019-08-05 RX ORDER — INSULIN GLARGINE 100 [IU]/ML
40 INJECTION, SOLUTION SUBCUTANEOUS NIGHTLY
Status: DISCONTINUED | OUTPATIENT
Start: 2019-08-05 | End: 2019-08-08 | Stop reason: HOSPADM

## 2019-08-05 RX ORDER — DEXTROSE MONOHYDRATE 50 MG/ML
100 INJECTION, SOLUTION INTRAVENOUS PRN
Status: DISCONTINUED | OUTPATIENT
Start: 2019-08-05 | End: 2019-08-08 | Stop reason: HOSPADM

## 2019-08-05 RX ORDER — HYDRALAZINE HYDROCHLORIDE 50 MG/1
50 TABLET, FILM COATED ORAL 3 TIMES DAILY
Status: DISCONTINUED | OUTPATIENT
Start: 2019-08-05 | End: 2019-08-08 | Stop reason: HOSPADM

## 2019-08-05 RX ORDER — SODIUM CHLORIDE 0.9 % (FLUSH) 0.9 %
10 SYRINGE (ML) INJECTION PRN
Status: DISCONTINUED | OUTPATIENT
Start: 2019-08-05 | End: 2019-08-08 | Stop reason: HOSPADM

## 2019-08-05 RX ORDER — OXYCODONE AND ACETAMINOPHEN 7.5; 325 MG/1; MG/1
1 TABLET ORAL EVERY 6 HOURS PRN
Status: DISCONTINUED | OUTPATIENT
Start: 2019-08-05 | End: 2019-08-08 | Stop reason: HOSPADM

## 2019-08-05 RX ORDER — HEPARIN SODIUM 5000 [USP'U]/ML
5000 INJECTION, SOLUTION INTRAVENOUS; SUBCUTANEOUS EVERY 8 HOURS SCHEDULED
Status: DISCONTINUED | OUTPATIENT
Start: 2019-08-05 | End: 2019-08-08 | Stop reason: HOSPADM

## 2019-08-05 RX ORDER — ASPIRIN 81 MG/1
81 TABLET, CHEWABLE ORAL DAILY
Status: DISCONTINUED | OUTPATIENT
Start: 2019-08-05 | End: 2019-08-08 | Stop reason: HOSPADM

## 2019-08-05 RX ORDER — SODIUM CHLORIDE 0.9 % (FLUSH) 0.9 %
10 SYRINGE (ML) INJECTION EVERY 12 HOURS SCHEDULED
Status: DISCONTINUED | OUTPATIENT
Start: 2019-08-05 | End: 2019-08-08 | Stop reason: HOSPADM

## 2019-08-05 RX ORDER — NICOTINE POLACRILEX 4 MG
15 LOZENGE BUCCAL PRN
Status: DISCONTINUED | OUTPATIENT
Start: 2019-08-05 | End: 2019-08-08 | Stop reason: HOSPADM

## 2019-08-05 RX ORDER — ACETAMINOPHEN 325 MG/1
650 TABLET ORAL EVERY 4 HOURS PRN
Status: DISCONTINUED | OUTPATIENT
Start: 2019-08-05 | End: 2019-08-08 | Stop reason: HOSPADM

## 2019-08-05 RX ORDER — ONDANSETRON 2 MG/ML
4 INJECTION INTRAMUSCULAR; INTRAVENOUS EVERY 6 HOURS PRN
Status: DISCONTINUED | OUTPATIENT
Start: 2019-08-05 | End: 2019-08-08 | Stop reason: HOSPADM

## 2019-08-05 RX ORDER — ONDANSETRON 4 MG/1
4 TABLET, ORALLY DISINTEGRATING ORAL EVERY 8 HOURS PRN
Status: DISCONTINUED | OUTPATIENT
Start: 2019-08-05 | End: 2019-08-08 | Stop reason: HOSPADM

## 2019-08-05 RX ORDER — DEXTROSE MONOHYDRATE 25 G/50ML
12.5 INJECTION, SOLUTION INTRAVENOUS PRN
Status: DISCONTINUED | OUTPATIENT
Start: 2019-08-05 | End: 2019-08-08 | Stop reason: HOSPADM

## 2019-08-05 RX ADMIN — INSULIN LISPRO 1 UNITS: 100 INJECTION, SOLUTION INTRAVENOUS; SUBCUTANEOUS at 19:24

## 2019-08-05 RX ADMIN — HYDRALAZINE HYDROCHLORIDE 50 MG: 50 TABLET, FILM COATED ORAL at 22:12

## 2019-08-05 RX ADMIN — INSULIN LISPRO 4 UNITS: 100 INJECTION, SOLUTION INTRAVENOUS; SUBCUTANEOUS at 09:12

## 2019-08-05 RX ADMIN — VANCOMYCIN HYDROCHLORIDE 2000 MG: 1 INJECTION, POWDER, LYOPHILIZED, FOR SOLUTION INTRAVENOUS at 04:48

## 2019-08-05 RX ADMIN — ONDANSETRON 4 MG: 2 INJECTION INTRAMUSCULAR; INTRAVENOUS at 05:09

## 2019-08-05 RX ADMIN — INSULIN LISPRO 25 UNITS: 100 INJECTION, SOLUTION INTRAVENOUS; SUBCUTANEOUS at 19:24

## 2019-08-05 RX ADMIN — CEFTRIAXONE 1 G: 1 INJECTION, POWDER, FOR SOLUTION INTRAMUSCULAR; INTRAVENOUS at 02:02

## 2019-08-05 RX ADMIN — ONDANSETRON 4 MG: 2 INJECTION INTRAMUSCULAR; INTRAVENOUS at 12:27

## 2019-08-05 RX ADMIN — OXYCODONE HYDROCHLORIDE AND ACETAMINOPHEN 1 TABLET: 7.5; 325 TABLET ORAL at 12:26

## 2019-08-05 RX ADMIN — SODIUM CHLORIDE: 9 INJECTION, SOLUTION INTRAVENOUS at 02:02

## 2019-08-05 RX ADMIN — INSULIN GLARGINE 40 UNITS: 100 INJECTION, SOLUTION SUBCUTANEOUS at 22:18

## 2019-08-05 RX ADMIN — VANCOMYCIN HYDROCHLORIDE 1500 MG: 5 INJECTION, POWDER, LYOPHILIZED, FOR SOLUTION INTRAVENOUS at 22:36

## 2019-08-05 RX ADMIN — INSULIN LISPRO 1 UNITS: 100 INJECTION, SOLUTION INTRAVENOUS; SUBCUTANEOUS at 22:18

## 2019-08-05 RX ADMIN — Medication 10 ML: at 22:12

## 2019-08-05 RX ADMIN — ONDANSETRON 4 MG: 2 INJECTION INTRAMUSCULAR; INTRAVENOUS at 19:25

## 2019-08-05 RX ADMIN — SODIUM CHLORIDE: 9 INJECTION, SOLUTION INTRAVENOUS at 13:20

## 2019-08-05 RX ADMIN — HEPARIN SODIUM 5000 UNITS: 5000 INJECTION INTRAVENOUS; SUBCUTANEOUS at 07:02

## 2019-08-05 RX ADMIN — HYDRALAZINE HYDROCHLORIDE 50 MG: 50 TABLET, FILM COATED ORAL at 13:25

## 2019-08-05 RX ADMIN — INSULIN GLARGINE 40 UNITS: 100 INJECTION, SOLUTION SUBCUTANEOUS at 02:03

## 2019-08-05 RX ADMIN — Medication 10 ML: at 05:09

## 2019-08-05 RX ADMIN — OXYCODONE HYDROCHLORIDE AND ACETAMINOPHEN 1 TABLET: 7.5; 325 TABLET ORAL at 19:24

## 2019-08-05 RX ADMIN — INSULIN LISPRO 2 UNITS: 100 INJECTION, SOLUTION INTRAVENOUS; SUBCUTANEOUS at 02:03

## 2019-08-05 RX ADMIN — HEPARIN SODIUM 5000 UNITS: 5000 INJECTION INTRAVENOUS; SUBCUTANEOUS at 14:02

## 2019-08-05 RX ADMIN — GADOBENATE DIMEGLUMINE 20 ML: 529 INJECTION, SOLUTION INTRAVENOUS at 18:50

## 2019-08-05 RX ADMIN — INSULIN LISPRO 2 UNITS: 100 INJECTION, SOLUTION INTRAVENOUS; SUBCUTANEOUS at 14:02

## 2019-08-05 RX ADMIN — ASPIRIN 81 MG 81 MG: 81 TABLET ORAL at 09:12

## 2019-08-05 RX ADMIN — OXYCODONE HYDROCHLORIDE AND ACETAMINOPHEN 1 TABLET: 7.5; 325 TABLET ORAL at 02:02

## 2019-08-05 RX ADMIN — INSULIN LISPRO 25 UNITS: 100 INJECTION, SOLUTION INTRAVENOUS; SUBCUTANEOUS at 14:01

## 2019-08-05 RX ADMIN — HYDRALAZINE HYDROCHLORIDE 50 MG: 50 TABLET, FILM COATED ORAL at 07:03

## 2019-08-05 ASSESSMENT — PAIN SCALES - GENERAL
PAINLEVEL_OUTOF10: 0
PAINLEVEL_OUTOF10: 8
PAINLEVEL_OUTOF10: 7
PAINLEVEL_OUTOF10: 6
PAINLEVEL_OUTOF10: 7
PAINLEVEL_OUTOF10: 7
PAINLEVEL_OUTOF10: 8

## 2019-08-05 ASSESSMENT — PAIN DESCRIPTION - LOCATION
LOCATION: FOOT
LOCATION: FOOT

## 2019-08-05 ASSESSMENT — PAIN DESCRIPTION - ORIENTATION: ORIENTATION: RIGHT

## 2019-08-05 ASSESSMENT — PAIN DESCRIPTION - PAIN TYPE
TYPE: ACUTE PAIN
TYPE: ACUTE PAIN

## 2019-08-05 NOTE — ED PROVIDER NOTES
Smokeless tobacco: Never Used   Substance and Sexual Activity    Alcohol use: No    Drug use: Yes     Frequency: 7.0 times per week     Types: Marijuana    Sexual activity: Yes   Lifestyle    Physical activity:     Days per week: Not on file     Minutes per session: Not on file    Stress: Not on file   Relationships    Social connections:     Talks on phone: Not on file     Gets together: Not on file     Attends [employer] service: Not on file     Active member of club or organization: Not on file     Attends meetings of clubs or organizations: Not on file     Relationship status: Not on file    Intimate partner violence:     Fear of current or ex partner: Not on file     Emotionally abused: Not on file     Physically abused: Not on file     Forced sexual activity: Not on file   Other Topics Concern    Not on file   Social History Narrative    Not on file     Current Facility-Administered Medications   Medication Dose Route Frequency Provider Last Rate Last Dose    ondansetron (ZOFRAN) injection 4 mg  4 mg Intravenous Q30 Min PRN Saurabh Montaño MD   4 mg at 08/04/19 2051    0.9 % sodium chloride bolus  1,000 mL Intravenous Once Saurabh Montaño MD 1,000 mL/hr at 08/04/19 2219 1,000 mL at 08/04/19 2219    0.9 % sodium chloride infusion   Intravenous Continuous Saurabh Montaño MD         Current Outpatient Medications   Medication Sig Dispense Refill    insulin glargine (LANTUS) 100 UNIT/ML injection vial Inject 40 Units into the skin nightly 1 vial 0    insulin lispro (HUMALOG) 100 UNIT/ML injection vial Inject 25 Units into the skin 3 times daily (with meals) 1 vial 0    linagliptin (TRADJENTA) 5 MG tablet Take 1 tablet by mouth daily 30 tablet 0    metFORMIN (GLUCOPHAGE) 1000 MG tablet Take 1 tablet by mouth 2 times daily (with meals) 60 tablet 0    hydrALAZINE (APRESOLINE) 50 MG tablet Take 1 tablet by mouth 3 times daily 90 tablet 0    tiZANidine (ZANAFLEX) 2 MG tablet Take 2 mg by mouth every 8 hours as needed (muscle spasms)      lidocaine (LIDODERM) 5 % Place 1 patch onto the skin daily 12 hours on, 12 hours off.  lisinopril (PRINIVIL;ZESTRIL) 2.5 MG tablet Take 2.5 mg by mouth daily      naproxen (NAPROSYN) 500 MG tablet Take 500 mg by mouth 2 times daily as needed       oxyCODONE-acetaminophen (PERCOCET) 7.5-325 MG per tablet Take 1 tablet by mouth every 6 hours as needed for Pain. .      ondansetron (ZOFRAN) 4 MG tablet Take 1 tablet by mouth every 8 hours as needed for Nausea or Vomiting 20 tablet 0    aspirin 81 MG chewable tablet Take 1 tablet by mouth daily 30 tablet 3    Lancets MISC 1 each by Does not apply route daily 100 each 3    Insulin Pen Needle 32G X 5 MM MISC 1 each by Does not apply route daily 100 each 3    glucose monitoring kit (FREESTYLE) monitoring kit 1 each by Does not apply route once for 1 dose. 1 kit 0     Allergies   Allergen Reactions    Adhesive Tape Rash    Doxycycline Nausea And Vomiting    Reglan [Metoclopramide] Anxiety     Nursing Notes Reviewed    ROS:  At least 10 systems reviewed and otherwise negative except as in the [address] Physical Exam:  ED Triage Vitals [08/04/19 2022]   Enc Vitals Group      BP (!) 161/145      Pulse 97      Resp 15      Temp 98.9 °F (37.2 °C)      Temp Source Oral      SpO2 95 %      Weight 260 lb (117.9 kg)      Height 5' 9\" (1.753 m)      Head Circumference       Peak Flow       Pain Score       Pain Loc       Pain Edu? Excl. in [address]? My pulse oximetry interpretation is which is within the normal range    GENERAL APPEARANCE: Awake and alert. Cooperative. No acute distress. HEAD: Normocephalic. Atraumatic. EYES: EOM's grossly intact. Sclera anicteric. ENT: Mucous membranes are moist. Tolerates saliva. No trismus. NECK: Supple. No meningismus. Trachea midline. HEART: RRR. Radial pulses 2+. LUNGS: Respirations unlabored. CTAB  ABDOMEN: Soft. Non-tender. No guarding or rebound.   BACK: no midline bony

## 2019-08-05 NOTE — PROGRESS NOTES
Emesis x1. Per pt, emesis was brown in color. IV vanco was just initiated prior to episode of emesis, although pt says he has had vanco in the past with no issues. Upon further questioning, pt admits to no BM x 4 days, which is atypical for him. He feels like he needs to have a BM but unable to go. PRN zofran administered. Will notify physician for further orders.

## 2019-08-05 NOTE — PLAN OF CARE
Problem: Falls - Risk of:  Goal: Will remain free from falls  Description  Will remain free from falls  Outcome: Ongoing  Goal: Absence of physical injury  Description  Absence of physical injury  Outcome: Ongoing     Problem: PAIN MANAGEMENT  Goal: Pain control  Description  Patient will demonstrate personal actions to control pain. Outcome: Ongoing     Problem: Risk for Infection  Goal: Will show no infection signs and symptoms  Outcome: Ongoing     Problem: GLYCEMIA IMBALANCE  Goal: Knowledge - diabetes management  Description  Patient will verbalize knowledge about diabetes mellitus and its  control.      Outcome: Ongoing

## 2019-08-05 NOTE — CONSULTS
toe    TOE AMPUTATION Right 3/23/2019    TOE AMPUTATION RIGHT 5TH TOE performed by Chandan Mendez DPM at [address] [place] [address] UPPER GASTROINTESTINAL ENDOSCOPY  2011    Mild gastritis with moderatley severe antritis, small hiatal hernia, Dr. Barbara Nguyen 2019    EGD BIOPSY performed by Geovanna Larikn MD at [facility] ENDOSCOPY     Current Medications:   Scheduled Meds:   aspirin  81 mg Oral Daily    insulin glargine  40 Units Subcutaneous Nightly    hydrALAZINE  50 mg Oral TID    insulin lispro  25 Units Subcutaneous TID WC    sodium chloride flush  10 mL Intravenous 2 times per day    cefTRIAXone (ROCEPHIN) IV  1 g Intravenous Q24H    insulin lispro  0-6 Units Subcutaneous TID WC    insulin lispro  0-3 Units Subcutaneous Nightly    heparin (porcine)  5,000 Units Subcutaneous 3 times per day    vancomycin  1,500 mg Intravenous Q18H     Continuous Infusions:   dextrose      sodium chloride 100 mL/hr at 19 0202     PRN Meds:.ondansetron, oxyCODONE-acetaminophen, sodium chloride flush, magnesium hydroxide, ondansetron, glucose, dextrose, glucagon (rDNA), dextrose, acetaminophen    Allergies:  Adhesive tape;  Doxycycline; and Reglan [metoclopramide]    Social History:    Social History     Socioeconomic History    Marital status:      Spouse name: Not on file    Number of children: Not on file    Years of education: Not on file    Highest education level: Not on file   Occupational History    Not on file   Social Needs    Financial resource strain: Not on file    Food insecurity:     Worry: Not on file     Inability: Not on file    Transportation needs:     Medical: Not on file     Non-medical: Not on file   Tobacco Use    Smoking status: Former Smoker     Years: 20.00     Last attempt to quit: 2017     Years since quittin.7    Smokeless tobacco: Never Used   Substance and Sexual Activity    Alcohol use: 147/90   Pulse 84   Temp 98.4 °F (36.9 °C) (Oral)   Resp 16   Ht 5' 9\" (1.753 m)   Wt 270 lb (122.5 kg)   SpO2 97%   BMI 39.87 kg/m²     Labs:  CBC:         Lab Results   Component Value Date     WBC 11.6 08/05/2019     RBC 3.91 08/05/2019     HGB 12.0 08/05/2019     HCT 36.7 08/05/2019     MCV 93.9 08/05/2019     MCH 30.7 08/05/2019     MCHC 32.7 08/05/2019     RDW 12.0 08/05/2019      08/05/2019     MPV 11.0 08/05/2019      BMP:          Lab Results   Component Value Date      08/05/2019     K 4.7 08/05/2019      08/05/2019     CO2 25 08/05/2019     BUN 32 08/05/2019     CREATININE 1.5 08/05/2019     CALCIUM 8.5 08/05/2019     GFRAA >60 08/05/2019     LABGLOM 51 08/05/2019     GLUCOSE 375 08/05/2019      Imaging right foot on 8-4-2019 three-view right  -  1. Interval partial amputation of the 5th toe.   2. Chronic destructive changes of the midfoot which again may be related to   chronic changes of osteomyelitis or neuropathic arthropathy.  Findings   overall appear similar when compared with previous exam.   3. Subcutaneous edema with no evidence for subcutaneous gas.

## 2019-08-05 NOTE — H&P
°F (36.9 °C) (Oral)   Resp 16   Ht 5' 9\" (1.753 m)   Wt 270 lb (122.5 kg)   SpO2 97%   BMI 39.87 kg/m²     General appearance:  Appears comfortable. Well nourished  Eyes: Sclera clear, pupils equal  ENT: Moist mucus membranes, no thrush. Trachea midline. Cardiovascular: Regular rhythm, normal S1, S2. No murmur, gallop, rub. No edema in lower extremities  Respiratory: Clear to auscultation bilaterally, no wheeze, good inspiratory effort  Gastrointestinal: Abdomen soft, non-tender, not distended, normal bowel sounds  Musculoskeletal: redness at right foot with slight edema , healed 5 th toe amputated , no discharge . Neurology: Cranial nerves grossly intact. Alert and oriented in time, place and person. No speech or motor deficits  Psychiatry: Appropriate affect.  Not agitated  Skin: Warm, dry, normal turgor, no rash    Labs:  CBC:   Lab Results   Component Value Date    WBC 11.6 08/05/2019    RBC 3.91 08/05/2019    HGB 12.0 08/05/2019    HCT 36.7 08/05/2019    MCV 93.9 08/05/2019    MCH 30.7 08/05/2019    MCHC 32.7 08/05/2019    RDW 12.0 08/05/2019     08/05/2019    MPV 11.0 08/05/2019     BMP:    Lab Results   Component Value Date     08/05/2019    K 4.7 08/05/2019     08/05/2019    CO2 25 08/05/2019    BUN 32 08/05/2019    CREATININE 1.5 08/05/2019    CALCIUM 8.5 08/05/2019    GFRAA >60 08/05/2019    LABGLOM 51 08/05/2019    GLUCOSE 375 08/05/2019       Chest Xray:   EKG:    I visualized CXR images and EKG strips      Patient Active Problem List   Diagnosis Code    Hiatal hernia K44.9    Diabetes mellitus type 2, improved controlled E11.65    Diabetic neuropathy (HCC) E11.40    Panic attack F41.0    Anxiety associated with depression F41.8    Neck pain M54.2    DANIELA (obstructive sleep apnea) G47.33    Urinary frequency R35.0    Bilateral carpal tunnel syndrome- left worse than right G56.03    Hyperlipidemia with target LDL less than 100 E78.5    HTN, goal below 140/90 I10   

## 2019-08-06 ENCOUNTER — ANESTHESIA (OUTPATIENT)
Dept: OPERATING ROOM | Age: 44
DRG: 314 | End: 2019-08-06
Payer: COMMERCIAL

## 2019-08-06 ENCOUNTER — ANESTHESIA EVENT (OUTPATIENT)
Dept: OPERATING ROOM | Age: 44
DRG: 314 | End: 2019-08-06
Payer: COMMERCIAL

## 2019-08-06 VITALS — SYSTOLIC BLOOD PRESSURE: 117 MMHG | OXYGEN SATURATION: 100 % | DIASTOLIC BLOOD PRESSURE: 72 MMHG

## 2019-08-06 LAB
GLUCOSE BLD-MCNC: 164 MG/DL (ref 70–99)
GLUCOSE BLD-MCNC: 169 MG/DL (ref 70–99)
GLUCOSE BLD-MCNC: 173 MG/DL (ref 70–99)
GLUCOSE BLD-MCNC: 186 MG/DL (ref 70–99)
GLUCOSE BLD-MCNC: 202 MG/DL (ref 70–99)

## 2019-08-06 PROCEDURE — 2580000003 HC RX 258: Performed by: FAMILY MEDICINE

## 2019-08-06 PROCEDURE — 87073 CULTURE BACTERIA ANAEROBIC: CPT

## 2019-08-06 PROCEDURE — 87071 CULTURE AEROBIC QUANT OTHER: CPT

## 2019-08-06 PROCEDURE — 2500000003 HC RX 250 WO HCPCS: Performed by: PODIATRIST

## 2019-08-06 PROCEDURE — 82962 GLUCOSE BLOOD TEST: CPT

## 2019-08-06 PROCEDURE — 1200000000 HC SEMI PRIVATE

## 2019-08-06 PROCEDURE — 6360000002 HC RX W HCPCS: Performed by: NURSE ANESTHETIST, CERTIFIED REGISTERED

## 2019-08-06 PROCEDURE — 88305 TISSUE EXAM BY PATHOLOGIST: CPT

## 2019-08-06 PROCEDURE — 0Y6V0Z0 DETACHMENT AT RIGHT 4TH TOE, COMPLETE, OPEN APPROACH: ICD-10-PCS | Performed by: PODIATRIST

## 2019-08-06 PROCEDURE — 88311 DECALCIFY TISSUE: CPT

## 2019-08-06 PROCEDURE — 3700000000 HC ANESTHESIA ATTENDED CARE: Performed by: PODIATRIST

## 2019-08-06 PROCEDURE — 3600000012 HC SURGERY LEVEL 2 ADDTL 15MIN: Performed by: PODIATRIST

## 2019-08-06 PROCEDURE — 3700000001 HC ADD 15 MINUTES (ANESTHESIA): Performed by: PODIATRIST

## 2019-08-06 PROCEDURE — 3600000002 HC SURGERY LEVEL 2 BASE: Performed by: PODIATRIST

## 2019-08-06 PROCEDURE — 87147 CULTURE TYPE IMMUNOLOGIC: CPT

## 2019-08-06 PROCEDURE — 6370000000 HC RX 637 (ALT 250 FOR IP): Performed by: FAMILY MEDICINE

## 2019-08-06 PROCEDURE — 2709999900 HC NON-CHARGEABLE SUPPLY: Performed by: PODIATRIST

## 2019-08-06 PROCEDURE — 6360000002 HC RX W HCPCS: Performed by: FAMILY MEDICINE

## 2019-08-06 PROCEDURE — 87186 SC STD MICRODIL/AGAR DIL: CPT

## 2019-08-06 PROCEDURE — 87205 SMEAR GRAM STAIN: CPT

## 2019-08-06 PROCEDURE — 2580000003 HC RX 258: Performed by: EMERGENCY MEDICINE

## 2019-08-06 PROCEDURE — 87077 CULTURE AEROBIC IDENTIFY: CPT

## 2019-08-06 RX ORDER — LIDOCAINE HYDROCHLORIDE 10 MG/ML
INJECTION, SOLUTION INFILTRATION; PERINEURAL
Status: COMPLETED | OUTPATIENT
Start: 2019-08-06 | End: 2019-08-06

## 2019-08-06 RX ORDER — MIDAZOLAM HYDROCHLORIDE 1 MG/ML
INJECTION INTRAMUSCULAR; INTRAVENOUS PRN
Status: DISCONTINUED | OUTPATIENT
Start: 2019-08-06 | End: 2019-08-06 | Stop reason: SDUPTHER

## 2019-08-06 RX ORDER — BUPIVACAINE HYDROCHLORIDE 2.5 MG/ML
INJECTION, SOLUTION EPIDURAL; INFILTRATION; INTRACAUDAL
Status: COMPLETED | OUTPATIENT
Start: 2019-08-06 | End: 2019-08-06

## 2019-08-06 RX ORDER — FENTANYL CITRATE 50 UG/ML
50 INJECTION, SOLUTION INTRAMUSCULAR; INTRAVENOUS EVERY 5 MIN PRN
Status: CANCELLED | OUTPATIENT
Start: 2019-08-06

## 2019-08-06 RX ORDER — LIDOCAINE HYDROCHLORIDE 20 MG/ML
INJECTION, SOLUTION INTRAVENOUS PRN
Status: DISCONTINUED | OUTPATIENT
Start: 2019-08-06 | End: 2019-08-06 | Stop reason: SDUPTHER

## 2019-08-06 RX ORDER — ONDANSETRON 2 MG/ML
4 INJECTION INTRAMUSCULAR; INTRAVENOUS
Status: CANCELLED | OUTPATIENT
Start: 2019-08-06 | End: 2019-08-06

## 2019-08-06 RX ORDER — FENTANYL CITRATE 50 UG/ML
25 INJECTION, SOLUTION INTRAMUSCULAR; INTRAVENOUS EVERY 5 MIN PRN
Status: CANCELLED | OUTPATIENT
Start: 2019-08-06

## 2019-08-06 RX ORDER — PROPOFOL 10 MG/ML
INJECTION, EMULSION INTRAVENOUS PRN
Status: DISCONTINUED | OUTPATIENT
Start: 2019-08-06 | End: 2019-08-06 | Stop reason: SDUPTHER

## 2019-08-06 RX ORDER — MIDAZOLAM HYDROCHLORIDE 1 MG/ML
INJECTION INTRAMUSCULAR; INTRAVENOUS
Status: COMPLETED
Start: 2019-08-06 | End: 2019-08-06

## 2019-08-06 RX ADMIN — ONDANSETRON 4 MG: 2 INJECTION INTRAMUSCULAR; INTRAVENOUS at 18:14

## 2019-08-06 RX ADMIN — PROPOFOL 50 MG: 10 INJECTION, EMULSION INTRAVENOUS at 09:44

## 2019-08-06 RX ADMIN — LIDOCAINE HYDROCHLORIDE 100 MG: 20 INJECTION, SOLUTION INTRAVENOUS at 09:42

## 2019-08-06 RX ADMIN — OXYCODONE HYDROCHLORIDE AND ACETAMINOPHEN 1 TABLET: 7.5; 325 TABLET ORAL at 11:46

## 2019-08-06 RX ADMIN — OXYCODONE HYDROCHLORIDE AND ACETAMINOPHEN 1 TABLET: 7.5; 325 TABLET ORAL at 02:48

## 2019-08-06 RX ADMIN — INSULIN LISPRO 1 UNITS: 100 INJECTION, SOLUTION INTRAVENOUS; SUBCUTANEOUS at 21:24

## 2019-08-06 RX ADMIN — SODIUM CHLORIDE: 9 INJECTION, SOLUTION INTRAVENOUS at 13:18

## 2019-08-06 RX ADMIN — PROPOFOL 50 MG: 10 INJECTION, EMULSION INTRAVENOUS at 09:57

## 2019-08-06 RX ADMIN — INSULIN LISPRO 25 UNITS: 100 INJECTION, SOLUTION INTRAVENOUS; SUBCUTANEOUS at 14:03

## 2019-08-06 RX ADMIN — INSULIN GLARGINE 40 UNITS: 100 INJECTION, SOLUTION SUBCUTANEOUS at 21:23

## 2019-08-06 RX ADMIN — HYDRALAZINE HYDROCHLORIDE 50 MG: 50 TABLET, FILM COATED ORAL at 14:08

## 2019-08-06 RX ADMIN — CEFTRIAXONE 1 G: 1 INJECTION, POWDER, FOR SOLUTION INTRAMUSCULAR; INTRAVENOUS at 02:59

## 2019-08-06 RX ADMIN — ASPIRIN 81 MG 81 MG: 81 TABLET ORAL at 14:08

## 2019-08-06 RX ADMIN — SODIUM CHLORIDE: 9 INJECTION, SOLUTION INTRAVENOUS at 22:20

## 2019-08-06 RX ADMIN — OXYCODONE HYDROCHLORIDE AND ACETAMINOPHEN 1 TABLET: 7.5; 325 TABLET ORAL at 18:07

## 2019-08-06 RX ADMIN — PROPOFOL 50 MG: 10 INJECTION, EMULSION INTRAVENOUS at 09:42

## 2019-08-06 RX ADMIN — MIDAZOLAM HYDROCHLORIDE 2 MG: 1 INJECTION, SOLUTION INTRAMUSCULAR; INTRAVENOUS at 09:30

## 2019-08-06 RX ADMIN — INSULIN LISPRO 1 UNITS: 100 INJECTION, SOLUTION INTRAVENOUS; SUBCUTANEOUS at 18:07

## 2019-08-06 RX ADMIN — VANCOMYCIN HYDROCHLORIDE 1500 MG: 5 INJECTION, POWDER, LYOPHILIZED, FOR SOLUTION INTRAVENOUS at 17:18

## 2019-08-06 RX ADMIN — PROPOFOL 50 MG: 10 INJECTION, EMULSION INTRAVENOUS at 10:15

## 2019-08-06 RX ADMIN — HYDRALAZINE HYDROCHLORIDE 50 MG: 50 TABLET, FILM COATED ORAL at 21:39

## 2019-08-06 RX ADMIN — ONDANSETRON 4 MG: 2 INJECTION INTRAMUSCULAR; INTRAVENOUS at 11:46

## 2019-08-06 RX ADMIN — INSULIN LISPRO 25 UNITS: 100 INJECTION, SOLUTION INTRAVENOUS; SUBCUTANEOUS at 18:13

## 2019-08-06 RX ADMIN — PROPOFOL 50 MG: 10 INJECTION, EMULSION INTRAVENOUS at 10:02

## 2019-08-06 RX ADMIN — ONDANSETRON 4 MG: 2 INJECTION INTRAMUSCULAR; INTRAVENOUS at 02:48

## 2019-08-06 RX ADMIN — INSULIN LISPRO 1 UNITS: 100 INJECTION, SOLUTION INTRAVENOUS; SUBCUTANEOUS at 14:01

## 2019-08-06 RX ADMIN — PROPOFOL 50 MG: 10 INJECTION, EMULSION INTRAVENOUS at 09:52

## 2019-08-06 RX ADMIN — PROPOFOL 50 MG: 10 INJECTION, EMULSION INTRAVENOUS at 09:48

## 2019-08-06 RX ADMIN — HEPARIN SODIUM 5000 UNITS: 5000 INJECTION INTRAVENOUS; SUBCUTANEOUS at 21:39

## 2019-08-06 ASSESSMENT — PULMONARY FUNCTION TESTS
PIF_VALUE: 1
PIF_VALUE: 0
PIF_VALUE: 1
PIF_VALUE: 1
PIF_VALUE: 0
PIF_VALUE: 1
PIF_VALUE: 0
PIF_VALUE: 0
PIF_VALUE: 1
PIF_VALUE: 0
PIF_VALUE: 1
PIF_VALUE: 1
PIF_VALUE: 0
PIF_VALUE: 1
PIF_VALUE: 1
PIF_VALUE: 0
PIF_VALUE: 0
PIF_VALUE: 1
PIF_VALUE: 0
PIF_VALUE: 1
PIF_VALUE: 0
PIF_VALUE: 1
PIF_VALUE: 0
PIF_VALUE: 1
PIF_VALUE: 1
PIF_VALUE: 0
PIF_VALUE: 1
PIF_VALUE: 1
PIF_VALUE: 0
PIF_VALUE: 1
PIF_VALUE: 0
PIF_VALUE: 0
PIF_VALUE: 1
PIF_VALUE: 0
PIF_VALUE: 1
PIF_VALUE: 0

## 2019-08-06 ASSESSMENT — PAIN SCALES - GENERAL
PAINLEVEL_OUTOF10: 7
PAINLEVEL_OUTOF10: 6
PAINLEVEL_OUTOF10: 7

## 2019-08-06 NOTE — ANESTHESIA PRE PROCEDURE
08/06/19 0330    acetaminophen (TYLENOL) tablet 650 mg  650 mg Oral Q4H PRN José Luis Izquierdo MD        insulin lispro (HUMALOG) injection vial 0-6 Units  0-6 Units Subcutaneous TID WC José Luis Izquierdo MD   1 Units at 08/05/19 1924    insulin lispro (HUMALOG) injection vial 0-3 Units  0-3 Units Subcutaneous Nightly José Luis Izquierdo MD   1 Units at 08/05/19 2218    heparin (porcine) injection 5,000 Units  5,000 Units Subcutaneous 3 times per day Kvng Sood MD   5,000 Units at 08/05/19 1402    vancomycin (VANCOCIN) 1,500 mg in dextrose 5 % 500 mL IVPB  1,500 mg Intravenous Q18H Kvng Sood MD   Stopped at 08/06/19 0036    0.9 % sodium chloride infusion   Intravenous Continuous Gabriel Gardner  mL/hr at 08/05/19 1320         Allergies: Allergies   Allergen Reactions    Adhesive Tape Rash    Doxycycline Nausea And Vomiting    Reglan [Metoclopramide] Anxiety       Problem List:    Patient Active Problem List   Diagnosis Code    Hiatal hernia K44.9    Diabetes mellitus type 2, improved controlled E11.65    Diabetic neuropathy (HCC) E11.40    Panic attack F41.0    Anxiety associated with depression F41.8    Neck pain M54.2    DANIELA (obstructive sleep apnea) G47.33    Urinary frequency R35.0    Bilateral carpal tunnel syndrome- left worse than right G56.03    Hyperlipidemia with target LDL less than 100 E78.5    HTN, goal below 140/90 I10    Bronchitis J40    Osteomyelitis (HCC) M86.9    Diabetic ulcer of left midfoot (HCC) E11.621, L97.429    WD-Diabetic ulcer of left midfoot associated with type 2 diabetes mellitus, with fat layer exposed ([address] E11.621, L97.422    Abscess H61.15    Periumbilical hernia D67.0    WD-Wound, surgical, infected, initial encounter T81. 4XXA    Sepsis ([address] A41.9    Cellulitis of left foot L03. 116    Constipation K59.00    Intractable nausea and vomiting R11.2    Uncontrolled type 2 diabetes mellitus (HCC) E11.65    Nausea and vomiting R11.2

## 2019-08-07 PROBLEM — M86.9 OSTEOMYELITIS OF FOURTH TOE OF RIGHT FOOT (HCC): Status: ACTIVE | Noted: 2019-08-07

## 2019-08-07 LAB
ANION GAP SERPL CALCULATED.3IONS-SCNC: 18 MMOL/L (ref 4–16)
BUN BLDV-MCNC: 11 MG/DL (ref 6–23)
CALCIUM SERPL-MCNC: 8.7 MG/DL (ref 8.3–10.6)
CHLORIDE BLD-SCNC: 97 MMOL/L (ref 99–110)
CO2: 22 MMOL/L (ref 21–32)
CREAT SERPL-MCNC: 1.2 MG/DL (ref 0.9–1.3)
DOSE AMOUNT: NORMAL
DOSE TIME: NORMAL
GFR AFRICAN AMERICAN: >60 ML/MIN/1.73M2
GFR NON-AFRICAN AMERICAN: >60 ML/MIN/1.73M2
GLUCOSE BLD-MCNC: 149 MG/DL (ref 70–99)
GLUCOSE BLD-MCNC: 153 MG/DL (ref 70–99)
GLUCOSE BLD-MCNC: 162 MG/DL (ref 70–99)
GLUCOSE BLD-MCNC: 163 MG/DL (ref 70–99)
GLUCOSE BLD-MCNC: 192 MG/DL (ref 70–99)
GLUCOSE BLD-MCNC: 254 MG/DL (ref 70–99)
HCT VFR BLD CALC: 33.8 % (ref 42–52)
HEMOGLOBIN: 11.6 GM/DL (ref 13.5–18)
MCH RBC QN AUTO: 30.6 PG (ref 27–31)
MCHC RBC AUTO-ENTMCNC: 34.3 % (ref 32–36)
MCV RBC AUTO: 89.2 FL (ref 78–100)
PDW BLD-RTO: 11.7 % (ref 11.7–14.9)
PLATELET # BLD: 191 K/CU MM (ref 140–440)
PMV BLD AUTO: 11.1 FL (ref 7.5–11.1)
POTASSIUM SERPL-SCNC: 4.2 MMOL/L (ref 3.5–5.1)
RBC # BLD: 3.79 M/CU MM (ref 4.6–6.2)
SODIUM BLD-SCNC: 137 MMOL/L (ref 135–145)
VANCOMYCIN TROUGH: 10.1 UG/ML (ref 10–20)
WBC # BLD: 6 K/CU MM (ref 4–10.5)

## 2019-08-07 PROCEDURE — 82962 GLUCOSE BLOOD TEST: CPT

## 2019-08-07 PROCEDURE — 2580000003 HC RX 258: Performed by: EMERGENCY MEDICINE

## 2019-08-07 PROCEDURE — 36415 COLL VENOUS BLD VENIPUNCTURE: CPT

## 2019-08-07 PROCEDURE — 80202 ASSAY OF VANCOMYCIN: CPT

## 2019-08-07 PROCEDURE — 2580000003 HC RX 258: Performed by: FAMILY MEDICINE

## 2019-08-07 PROCEDURE — 85027 COMPLETE CBC AUTOMATED: CPT

## 2019-08-07 PROCEDURE — 6360000002 HC RX W HCPCS: Performed by: FAMILY MEDICINE

## 2019-08-07 PROCEDURE — 1200000000 HC SEMI PRIVATE

## 2019-08-07 PROCEDURE — 6370000000 HC RX 637 (ALT 250 FOR IP): Performed by: FAMILY MEDICINE

## 2019-08-07 PROCEDURE — 80048 BASIC METABOLIC PNL TOTAL CA: CPT

## 2019-08-07 RX ADMIN — SODIUM CHLORIDE: 9 INJECTION, SOLUTION INTRAVENOUS at 08:33

## 2019-08-07 RX ADMIN — OXYCODONE HYDROCHLORIDE AND ACETAMINOPHEN 1 TABLET: 7.5; 325 TABLET ORAL at 14:47

## 2019-08-07 RX ADMIN — SODIUM CHLORIDE: 9 INJECTION, SOLUTION INTRAVENOUS at 17:54

## 2019-08-07 RX ADMIN — INSULIN LISPRO 1 UNITS: 100 INJECTION, SOLUTION INTRAVENOUS; SUBCUTANEOUS at 08:37

## 2019-08-07 RX ADMIN — ONDANSETRON 4 MG: 2 INJECTION INTRAMUSCULAR; INTRAVENOUS at 14:47

## 2019-08-07 RX ADMIN — ONDANSETRON 4 MG: 2 INJECTION INTRAMUSCULAR; INTRAVENOUS at 08:31

## 2019-08-07 RX ADMIN — Medication 10 ML: at 22:35

## 2019-08-07 RX ADMIN — ASPIRIN 81 MG 81 MG: 81 TABLET ORAL at 08:30

## 2019-08-07 RX ADMIN — HEPARIN SODIUM 5000 UNITS: 5000 INJECTION INTRAVENOUS; SUBCUTANEOUS at 14:47

## 2019-08-07 RX ADMIN — ONDANSETRON 4 MG: 2 INJECTION INTRAMUSCULAR; INTRAVENOUS at 22:34

## 2019-08-07 RX ADMIN — ONDANSETRON 4 MG: 2 INJECTION INTRAMUSCULAR; INTRAVENOUS at 00:18

## 2019-08-07 RX ADMIN — INSULIN LISPRO 1 UNITS: 100 INJECTION, SOLUTION INTRAVENOUS; SUBCUTANEOUS at 17:52

## 2019-08-07 RX ADMIN — CEFTRIAXONE 1 G: 1 INJECTION, POWDER, FOR SOLUTION INTRAMUSCULAR; INTRAVENOUS at 03:55

## 2019-08-07 RX ADMIN — INSULIN LISPRO 3 UNITS: 100 INJECTION, SOLUTION INTRAVENOUS; SUBCUTANEOUS at 12:35

## 2019-08-07 RX ADMIN — HEPARIN SODIUM 5000 UNITS: 5000 INJECTION INTRAVENOUS; SUBCUTANEOUS at 22:43

## 2019-08-07 RX ADMIN — INSULIN LISPRO 25 UNITS: 100 INJECTION, SOLUTION INTRAVENOUS; SUBCUTANEOUS at 12:37

## 2019-08-07 RX ADMIN — ONDANSETRON 4 MG: 2 INJECTION INTRAMUSCULAR; INTRAVENOUS at 22:40

## 2019-08-07 RX ADMIN — INSULIN LISPRO 25 UNITS: 100 INJECTION, SOLUTION INTRAVENOUS; SUBCUTANEOUS at 08:37

## 2019-08-07 RX ADMIN — HYDRALAZINE HYDROCHLORIDE 50 MG: 50 TABLET, FILM COATED ORAL at 22:35

## 2019-08-07 RX ADMIN — OXYCODONE HYDROCHLORIDE AND ACETAMINOPHEN 1 TABLET: 7.5; 325 TABLET ORAL at 08:31

## 2019-08-07 RX ADMIN — OXYCODONE HYDROCHLORIDE AND ACETAMINOPHEN 1 TABLET: 7.5; 325 TABLET ORAL at 22:35

## 2019-08-07 RX ADMIN — INSULIN LISPRO 25 UNITS: 100 INJECTION, SOLUTION INTRAVENOUS; SUBCUTANEOUS at 17:52

## 2019-08-07 RX ADMIN — OXYCODONE HYDROCHLORIDE AND ACETAMINOPHEN 1 TABLET: 7.5; 325 TABLET ORAL at 00:18

## 2019-08-07 RX ADMIN — HYDRALAZINE HYDROCHLORIDE 50 MG: 50 TABLET, FILM COATED ORAL at 14:47

## 2019-08-07 RX ADMIN — INSULIN LISPRO 1 UNITS: 100 INJECTION, SOLUTION INTRAVENOUS; SUBCUTANEOUS at 22:42

## 2019-08-07 RX ADMIN — INSULIN GLARGINE 40 UNITS: 100 INJECTION, SOLUTION SUBCUTANEOUS at 22:42

## 2019-08-07 RX ADMIN — VANCOMYCIN HYDROCHLORIDE 1500 MG: 5 INJECTION, POWDER, LYOPHILIZED, FOR SOLUTION INTRAVENOUS at 11:14

## 2019-08-07 ASSESSMENT — PAIN DESCRIPTION - LOCATION
LOCATION: FOOT

## 2019-08-07 ASSESSMENT — PAIN DESCRIPTION - FREQUENCY
FREQUENCY: CONTINUOUS
FREQUENCY: INTERMITTENT
FREQUENCY: CONTINUOUS

## 2019-08-07 ASSESSMENT — PAIN DESCRIPTION - PAIN TYPE
TYPE: ACUTE PAIN
TYPE: CHRONIC PAIN
TYPE: ACUTE PAIN

## 2019-08-07 ASSESSMENT — PAIN DESCRIPTION - ONSET
ONSET: ON-GOING

## 2019-08-07 ASSESSMENT — PAIN - FUNCTIONAL ASSESSMENT
PAIN_FUNCTIONAL_ASSESSMENT: PREVENTS OR INTERFERES SOME ACTIVE ACTIVITIES AND ADLS

## 2019-08-07 ASSESSMENT — PAIN DESCRIPTION - DESCRIPTORS
DESCRIPTORS: THROBBING
DESCRIPTORS: THROBBING
DESCRIPTORS: ACHING;THROBBING

## 2019-08-07 ASSESSMENT — PAIN DESCRIPTION - ORIENTATION
ORIENTATION: RIGHT

## 2019-08-07 ASSESSMENT — PAIN SCALES - GENERAL
PAINLEVEL_OUTOF10: 7
PAINLEVEL_OUTOF10: 0
PAINLEVEL_OUTOF10: 7
PAINLEVEL_OUTOF10: 8
PAINLEVEL_OUTOF10: 8

## 2019-08-07 ASSESSMENT — PAIN DESCRIPTION - PROGRESSION
CLINICAL_PROGRESSION: NOT CHANGED
CLINICAL_PROGRESSION: NOT CHANGED

## 2019-08-07 NOTE — PLAN OF CARE
Problem: Falls - Risk of:  Goal: Will remain free from falls  Description  Will remain free from falls  8/7/2019 1139 by Rosalia Clement RN  Outcome: Ongoing  8/7/2019 0015 by Chris Granados RN  Outcome: Ongoing  Goal: Absence of physical injury  Description  Absence of physical injury  8/7/2019 1139 by Rosalia Clement RN  Outcome: Ongoing  8/7/2019 0015 by Chris Granados RN  Outcome: Ongoing     Problem: PAIN MANAGEMENT  Goal: Pain control  Description  Patient will demonstrate personal actions to control pain. 8/7/2019 1139 by Rosalia Clement RN  Outcome: Ongoing  8/7/2019 0015 by Chris Granados RN  Outcome: Ongoing     Problem: Risk for Infection  Goal: Will show no infection signs and symptoms  8/7/2019 1139 by Rosalia Clement RN  Outcome: Ongoing  8/7/2019 0015 by Chris Granados RN  Outcome: Ongoing     Problem: GLYCEMIA IMBALANCE  Goal: Knowledge - diabetes management  Description  Patient will verbalize knowledge about diabetes mellitus and its  control.      8/7/2019 1139 by Rosalia Clement RN  Outcome: Ongoing  8/7/2019 0015 by Chris Granados RN  Outcome: Ongoing     Problem: Pain:  Goal: Pain level will decrease  Description  Pain level will decrease  8/7/2019 1139 by Rosalia Clement RN  Outcome: Ongoing  8/7/2019 0015 by Chris Granados RN  Outcome: Ongoing  Goal: Control of acute pain  Description  Control of acute pain  8/7/2019 1139 by Rosalia Clement RN  Outcome: Ongoing  8/7/2019 0015 by Chris Granados RN  Outcome: Ongoing  Goal: Control of chronic pain  Description  Control of chronic pain  8/7/2019 1139 by Rosalia Clement RN  Outcome: Ongoing  8/7/2019 0015 by Chris Granados RN  Outcome: Ongoing

## 2019-08-08 VITALS
HEIGHT: 69 IN | SYSTOLIC BLOOD PRESSURE: 144 MMHG | BODY MASS INDEX: 39.84 KG/M2 | TEMPERATURE: 98.3 F | WEIGHT: 269 LBS | HEART RATE: 87 BPM | OXYGEN SATURATION: 96 % | DIASTOLIC BLOOD PRESSURE: 100 MMHG | RESPIRATION RATE: 16 BRPM

## 2019-08-08 LAB
BUN BLDV-MCNC: 15 MG/DL (ref 6–23)
CREAT SERPL-MCNC: 1.2 MG/DL (ref 0.9–1.3)
CULTURE: ABNORMAL
GFR AFRICAN AMERICAN: >60 ML/MIN/1.73M2
GFR NON-AFRICAN AMERICAN: >60 ML/MIN/1.73M2
GLUCOSE BLD-MCNC: 205 MG/DL (ref 70–99)
GLUCOSE BLD-MCNC: 215 MG/DL (ref 70–99)
GRAM SMEAR: ABNORMAL
Lab: ABNORMAL
SPECIMEN: ABNORMAL

## 2019-08-08 PROCEDURE — 2580000003 HC RX 258: Performed by: FAMILY MEDICINE

## 2019-08-08 PROCEDURE — 6370000000 HC RX 637 (ALT 250 FOR IP): Performed by: FAMILY MEDICINE

## 2019-08-08 PROCEDURE — 82962 GLUCOSE BLOOD TEST: CPT

## 2019-08-08 PROCEDURE — 94761 N-INVAS EAR/PLS OXIMETRY MLT: CPT

## 2019-08-08 PROCEDURE — 36415 COLL VENOUS BLD VENIPUNCTURE: CPT

## 2019-08-08 PROCEDURE — 80202 ASSAY OF VANCOMYCIN: CPT

## 2019-08-08 PROCEDURE — 6370000000 HC RX 637 (ALT 250 FOR IP): Performed by: PODIATRIST

## 2019-08-08 PROCEDURE — 2580000003 HC RX 258: Performed by: EMERGENCY MEDICINE

## 2019-08-08 PROCEDURE — 6360000002 HC RX W HCPCS: Performed by: FAMILY MEDICINE

## 2019-08-08 PROCEDURE — 82565 ASSAY OF CREATININE: CPT

## 2019-08-08 PROCEDURE — 84520 ASSAY OF UREA NITROGEN: CPT

## 2019-08-08 RX ORDER — CEPHALEXIN 500 MG/1
500 CAPSULE ORAL 3 TIMES DAILY
Qty: 30 CAPSULE | Refills: 0 | Status: SHIPPED | OUTPATIENT
Start: 2019-08-08 | End: 2019-08-18

## 2019-08-08 RX ORDER — SULFAMETHOXAZOLE AND TRIMETHOPRIM 800; 160 MG/1; MG/1
1 TABLET ORAL 2 TIMES DAILY
Qty: 20 TABLET | Refills: 0 | Status: SHIPPED | OUTPATIENT
Start: 2019-08-08 | End: 2019-08-18

## 2019-08-08 RX ADMIN — INSULIN LISPRO 2 UNITS: 100 INJECTION, SOLUTION INTRAVENOUS; SUBCUTANEOUS at 09:08

## 2019-08-08 RX ADMIN — INSULIN LISPRO 25 UNITS: 100 INJECTION, SOLUTION INTRAVENOUS; SUBCUTANEOUS at 12:57

## 2019-08-08 RX ADMIN — OXYCODONE HYDROCHLORIDE AND ACETAMINOPHEN 1 TABLET: 7.5; 325 TABLET ORAL at 08:25

## 2019-08-08 RX ADMIN — INSULIN LISPRO 2 UNITS: 100 INJECTION, SOLUTION INTRAVENOUS; SUBCUTANEOUS at 12:58

## 2019-08-08 RX ADMIN — HYDRALAZINE HYDROCHLORIDE 50 MG: 50 TABLET, FILM COATED ORAL at 06:08

## 2019-08-08 RX ADMIN — HEPARIN SODIUM 5000 UNITS: 5000 INJECTION INTRAVENOUS; SUBCUTANEOUS at 06:08

## 2019-08-08 RX ADMIN — VANCOMYCIN HYDROCHLORIDE 1500 MG: 5 INJECTION, POWDER, LYOPHILIZED, FOR SOLUTION INTRAVENOUS at 03:51

## 2019-08-08 RX ADMIN — HYDRALAZINE HYDROCHLORIDE 50 MG: 50 TABLET, FILM COATED ORAL at 12:57

## 2019-08-08 RX ADMIN — CEFTRIAXONE 1 G: 1 INJECTION, POWDER, FOR SOLUTION INTRAMUSCULAR; INTRAVENOUS at 02:17

## 2019-08-08 RX ADMIN — SODIUM CHLORIDE: 9 INJECTION, SOLUTION INTRAVENOUS at 02:17

## 2019-08-08 RX ADMIN — INSULIN LISPRO 25 UNITS: 100 INJECTION, SOLUTION INTRAVENOUS; SUBCUTANEOUS at 09:06

## 2019-08-08 RX ADMIN — ASPIRIN 81 MG 81 MG: 81 TABLET ORAL at 08:25

## 2019-08-08 ASSESSMENT — PAIN SCALES - GENERAL
PAINLEVEL_OUTOF10: 8
PAINLEVEL_OUTOF10: 0

## 2019-08-08 NOTE — DISCHARGE SUMMARY
Assessment/Plan:   Tele   IV ABX, blood c/s, podiatrist consult , pain control  Glucose control insulin coverage   IVF , monitor renal function , Home meds review, hold ACEI and diuretics   DVT proph : Heparin       Consults. IP CONSULT TO PRIMARY CARE PROVIDER  PHARMACY TO DOSE VANCOMYCIN  IP CONSULT TO PODIATRY        Discharge Medications:   Current Discharge Medication List      START taking these medications    Details   sulfamethoxazole-trimethoprim (BACTRIM DS;SEPTRA DS) 800-160 MG per tablet Take 1 tablet by mouth 2 times daily for 10 days  Qty: 20 tablet, Refills: 0      cephALEXin (KEFLEX) 500 MG capsule Take 1 capsule by mouth 3 times daily for 10 days  Qty: 30 capsule, Refills: 0           Current Discharge Medication List        Current Discharge Medication List      CONTINUE these medications which have NOT CHANGED    Details   insulin glargine (LANTUS) 100 UNIT/ML injection vial Inject 40 Units into the skin nightly  Qty: 1 vial, Refills: 0      insulin lispro (HUMALOG) 100 UNIT/ML injection vial Inject 25 Units into the skin 3 times daily (with meals)  Qty: 1 vial, Refills: 0      linagliptin (TRADJENTA) 5 MG tablet Take 1 tablet by mouth daily  Qty: 30 tablet, Refills: 0      metFORMIN (GLUCOPHAGE) 1000 MG tablet Take 1 tablet by mouth 2 times daily (with meals)  Qty: 60 tablet, Refills: 0      hydrALAZINE (APRESOLINE) 50 MG tablet Take 1 tablet by mouth 3 times daily  Qty: 90 tablet, Refills: 0      tiZANidine (ZANAFLEX) 2 MG tablet Take 2 mg by mouth every 8 hours as needed (muscle spasms)      lidocaine (LIDODERM) 5 % Place 1 patch onto the skin daily 12 hours on, 12 hours off.      lisinopril (PRINIVIL;ZESTRIL) 2.5 MG tablet Take 2.5 mg by mouth daily      oxyCODONE-acetaminophen (PERCOCET) 7.5-325 MG per tablet Take 1 tablet by mouth every 6 hours as needed for Pain. .      ondansetron (ZOFRAN) 4 MG tablet Take 1 tablet by mouth every 8 hours as needed for Nausea or Vomiting  Qty: 20 tablet, ARTHROPATHY, NO ULCER, DIABETIC PT TECHNOLOGIST PROVIDED HISTORY: Reason for Exam: OSTEO/ 20 ML MULTIHANCE FINDINGS: LISFRANC JOINT:  Lisfranc ligament is visualized and is normal.  The alignment of the tarsal-metatarsal joint is anatomic. BONE MARROW: No evidence for osseous contusion or fracture. No suspicious marrow occupying lesions are identified. Degenerative marrow signal changes are identified at the tarsometatarsal articulations. Remote deformities involving the bases of the 2nd through 5th metatarsals and involving the cuboid. Findings most pronounced at the 5th tarsometatarsal articulation. Changes appear somewhat erosive at the 5th tarsometatarsal articulations. No associated marrow edema identified to suggest acute component. Partial amputation of the 1st digit involving the distal aspect of the distal 1st phalanx. Partial amputation of the 5th digit at the level of the mid proximal 5th phalanx. Mild patchy edema of the proximal and middle phalanges of the 4th toe with mild associated postcontrast enhancement of the proximal 4th phalanx. Mild decreased T1 signal of the distal aspect of the proximal 4th phalanx. Findings suspicious for early changes of osteomyelitis given surrounding soft tissue edema. JOINTS: Erosive changes of the 5th tarsometatarsal articulation which appear chronic. Moderate to severe degenerative changes of the 2nd through 5th tarsometatarsal articulations and mild degenerative change of the naviculocuneiform articulations. Mild degenerative change of the talonavicular joint. Small talonavicular joint effusion. SOFT TISSUES: Diffuse subcutaneous edema. Postcontrast enhancement of the 4th toe compatible with cellulitis. No organized drainable fluid collection identified. TENDONS: No evidence for tenosynovitis. 1. Diffuse subcutaneous edema with soft tissue enhancement involving the 4th toe most compatible with cellulitis.   No organized drainable fluid collection

## 2019-08-08 NOTE — PROGRESS NOTES
CLINICAL PHARMACY NOTE: MEDS TO [address] [facility] Patient?: No  Total # of Prescriptions Filled: 2   The following medications were delivered to the patient:  Bactrim DS  Cephalexin  Total # of Interventions Completed: 0  Time Spent (min): 15

## 2019-08-09 LAB
CULTURE: NORMAL
CULTURE: NORMAL
Lab: NORMAL
Lab: NORMAL
SPECIMEN: NORMAL
SPECIMEN: NORMAL

## 2019-11-25 ENCOUNTER — APPOINTMENT (OUTPATIENT)
Dept: CT IMAGING | Age: 44
End: 2019-11-25
Payer: COMMERCIAL

## 2019-11-25 ENCOUNTER — HOSPITAL ENCOUNTER (EMERGENCY)
Age: 44
Discharge: HOME | End: 2019-11-25
Payer: COMMERCIAL

## 2019-11-25 VITALS
WEIGHT: 260 LBS | OXYGEN SATURATION: 98 % | TEMPERATURE: 98.2 F | SYSTOLIC BLOOD PRESSURE: 149 MMHG | RESPIRATION RATE: 17 BRPM | BODY MASS INDEX: 37.22 KG/M2 | HEIGHT: 70 IN | DIASTOLIC BLOOD PRESSURE: 92 MMHG | HEART RATE: 88 BPM

## 2019-11-25 DIAGNOSIS — R03.0 ELEVATED BLOOD PRESSURE READING: ICD-10-CM

## 2019-11-25 DIAGNOSIS — S09.90XA CLOSED HEAD INJURY, INITIAL ENCOUNTER: Primary | ICD-10-CM

## 2019-11-25 DIAGNOSIS — J32.0 RIGHT MAXILLARY SINUSITIS: ICD-10-CM
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
## 2019-11-25 PROCEDURE — 70450 CT HEAD/BRAIN W/O DYE: CPT

## 2019-11-25 PROCEDURE — 6370000000 HC RX 637 (ALT 250 FOR IP): Performed by: PHYSICIAN ASSISTANT

## 2019-11-25 PROCEDURE — 99284 EMERGENCY DEPT VISIT MOD MDM: CPT

## 2019-11-25 PROCEDURE — 6360000002 HC RX W HCPCS: Performed by: PHYSICIAN ASSISTANT

## 2019-11-25 PROCEDURE — 96372 THER/PROPH/DIAG INJ SC/IM: CPT

## 2019-11-25 PROCEDURE — 70486 CT MAXILLOFACIAL W/O DYE: CPT

## 2019-11-25 RX ORDER — BUTALBITAL, ACETAMINOPHEN AND CAFFEINE 50; 325; 40 MG/1; MG/1; MG/1
1 TABLET ORAL ONCE
Status: COMPLETED | OUTPATIENT
Start: 2019-11-25 | End: 2019-11-25

## 2019-11-25 RX ORDER — KETOROLAC TROMETHAMINE 30 MG/ML
30 INJECTION, SOLUTION INTRAMUSCULAR; INTRAVENOUS ONCE
Status: COMPLETED | OUTPATIENT
Start: 2019-11-25 | End: 2019-11-25

## 2019-11-25 RX ORDER — AMOXICILLIN AND CLAVULANATE POTASSIUM 875; 125 MG/1; MG/1
1 TABLET, FILM COATED ORAL 2 TIMES DAILY
Qty: 14 TABLET | Refills: 0 | Status: SHIPPED | OUTPATIENT
Start: 2019-11-25 | End: 2019-12-02

## 2019-11-25 RX ORDER — IBUPROFEN 600 MG/1
600 TABLET ORAL EVERY 6 HOURS PRN
Qty: 30 TABLET | Refills: 0 | Status: ON HOLD | OUTPATIENT
Start: 2019-11-25 | End: 2021-03-29 | Stop reason: HOSPADM

## 2019-11-25 RX ADMIN — BUTALBITAL, ACETAMINOPHEN AND CAFFEINE 1 TABLET: 50; 325; 40 TABLET ORAL at 16:54

## 2019-11-25 RX ADMIN — KETOROLAC TROMETHAMINE 30 MG: 30 INJECTION, SOLUTION INTRAMUSCULAR; INTRAVENOUS at 16:54

## 2019-11-25 ASSESSMENT — PAIN DESCRIPTION - LOCATION: LOCATION: HEAD;FACE

## 2019-11-25 ASSESSMENT — PAIN DESCRIPTION - ORIENTATION: ORIENTATION: RIGHT

## 2019-11-25 ASSESSMENT — PAIN SCALES - GENERAL
PAINLEVEL_OUTOF10: 6
PAINLEVEL_OUTOF10: 5
PAINLEVEL_OUTOF10: 6

## 2019-11-25 ASSESSMENT — PAIN DESCRIPTION - PAIN TYPE: TYPE: ACUTE PAIN

## 2021-02-10 ENCOUNTER — HOSPITAL ENCOUNTER (OUTPATIENT)
Dept: ULTRASOUND IMAGING | Age: 46
Discharge: HOME | End: 2021-02-10
Payer: COMMERCIAL

## 2021-02-10 DIAGNOSIS — M79.662 PAIN IN BOTH LOWER LEGS: ICD-10-CM

## 2021-02-10 DIAGNOSIS — R22.43 LOCALIZED SWELLING, MASS AND LUMP, LOWER LIMB, BILATERAL: ICD-10-CM

## 2021-02-10 DIAGNOSIS — M79.661 PAIN IN BOTH LOWER LEGS: ICD-10-CM

## 2021-02-10 PROCEDURE — 93970 EXTREMITY STUDY: CPT

## 2021-03-22 ENCOUNTER — HOSPITAL ENCOUNTER (EMERGENCY)
Age: 46
Discharge: LEFT BEFORE DISCHARGE | End: 2021-03-22
Payer: MEDICARE

## 2021-03-22 VITALS
TEMPERATURE: 98.4 F | DIASTOLIC BLOOD PRESSURE: 84 MMHG | OXYGEN SATURATION: 99 % | HEART RATE: 103 BPM | SYSTOLIC BLOOD PRESSURE: 125 MMHG | HEIGHT: 69 IN | RESPIRATION RATE: 21 BRPM | WEIGHT: 270 LBS | BODY MASS INDEX: 39.99 KG/M2

## 2021-03-22 ASSESSMENT — PAIN DESCRIPTION - PAIN TYPE: TYPE: ACUTE PAIN

## 2021-03-22 ASSESSMENT — PAIN DESCRIPTION - DESCRIPTORS: DESCRIPTORS: ACHING

## 2021-03-22 ASSESSMENT — PAIN SCALES - GENERAL: PAINLEVEL_OUTOF10: 9

## 2021-03-22 ASSESSMENT — PAIN DESCRIPTION - FREQUENCY: FREQUENCY: CONTINUOUS

## 2021-03-22 NOTE — ED PROVIDER NOTES
EKG Interpretation    Interpreted by emergency department physician    Rhythm: sinus tachycardia  Rate: 100-110  Axis: normal  Ectopy: none  Conduction: normal  ST Segments: normal  T Waves: normal  Q Waves: none    Clinical Impression: Sinus tachycardia, otherwise normal EKG    Joce Gonzales MD  03/22/21 4223

## 2021-03-23 ENCOUNTER — HOSPITAL ENCOUNTER (INPATIENT)
Age: 46
LOS: 6 days | Discharge: HOME | DRG: 638 | End: 2021-03-29
Attending: EMERGENCY MEDICINE | Admitting: FAMILY MEDICINE
Payer: MEDICARE

## 2021-03-23 DIAGNOSIS — E87.1 HYPONATREMIA: ICD-10-CM

## 2021-03-23 DIAGNOSIS — E11.8 DIABETIC FOOT (HCC): ICD-10-CM

## 2021-03-23 DIAGNOSIS — L03.116 CELLULITIS OF LEFT LOWER EXTREMITY: Primary | ICD-10-CM

## 2021-03-23 DIAGNOSIS — R73.9 HYPERGLYCEMIA: ICD-10-CM

## 2021-03-23 PROBLEM — L03.90 CELLULITIS: Status: ACTIVE | Noted: 2021-03-23

## 2021-03-23 LAB
ALBUMIN SERPL-MCNC: 2.2 GM/DL (ref 3.4–5)
ALP BLD-CCNC: 76 IU/L (ref 40–128)
ALT SERPL-CCNC: 15 U/L (ref 10–40)
ANION GAP SERPL CALCULATED.3IONS-SCNC: 8 MMOL/L (ref 4–16)
AST SERPL-CCNC: 20 IU/L (ref 15–37)
BASOPHILS ABSOLUTE: 0 K/CU MM
BASOPHILS RELATIVE PERCENT: 0.4 % (ref 0–1)
BILIRUB SERPL-MCNC: 0.8 MG/DL (ref 0–1)
BUN BLDV-MCNC: 30 MG/DL (ref 6–23)
CALCIUM SERPL-MCNC: 8 MG/DL (ref 8.3–10.6)
CHLORIDE BLD-SCNC: 93 MMOL/L (ref 99–110)
CO2: 24 MMOL/L (ref 21–32)
CREAT SERPL-MCNC: 1.9 MG/DL (ref 0.9–1.3)
DIFFERENTIAL TYPE: ABNORMAL
EKG ATRIAL RATE: 105 BPM
EKG DIAGNOSIS: NORMAL
EKG P AXIS: 20 DEGREES
EKG P-R INTERVAL: 154 MS
EKG Q-T INTERVAL: 326 MS
EKG QRS DURATION: 80 MS
EKG QTC CALCULATION (BAZETT): 430 MS
EKG R AXIS: 19 DEGREES
EKG T AXIS: 71 DEGREES
EKG VENTRICULAR RATE: 105 BPM
EOSINOPHILS ABSOLUTE: 0 K/CU MM
EOSINOPHILS RELATIVE PERCENT: 0.2 % (ref 0–3)
ESTIMATED AVERAGE GLUCOSE: 255 MG/DL
GFR AFRICAN AMERICAN: 47 ML/MIN/1.73M2
GFR NON-AFRICAN AMERICAN: 39 ML/MIN/1.73M2
GLUCOSE BLD-MCNC: 239 MG/DL (ref 70–99)
GLUCOSE BLD-MCNC: 248 MG/DL (ref 70–99)
GLUCOSE BLD-MCNC: 301 MG/DL (ref 70–99)
GLUCOSE BLD-MCNC: 303 MG/DL (ref 70–99)
GLUCOSE BLD-MCNC: 395 MG/DL (ref 70–99)
HBA1C MFR BLD: 10.5 % (ref 4.2–6.3)
HCT VFR BLD CALC: 32.8 % (ref 42–52)
HEMOGLOBIN: 11.2 GM/DL (ref 13.5–18)
IMMATURE NEUTROPHIL %: 0.9 % (ref 0–0.43)
LACTATE: 1.2 MMOL/L (ref 0.4–2)
LYMPHOCYTES ABSOLUTE: 0.7 K/CU MM
LYMPHOCYTES RELATIVE PERCENT: 6.2 % (ref 24–44)
MCH RBC QN AUTO: 30.6 PG (ref 27–31)
MCHC RBC AUTO-ENTMCNC: 34.1 % (ref 32–36)
MCV RBC AUTO: 89.6 FL (ref 78–100)
MONOCYTES ABSOLUTE: 0.9 K/CU MM
MONOCYTES RELATIVE PERCENT: 7.6 % (ref 0–4)
NUCLEATED RBC %: 0 %
PDW BLD-RTO: 12.3 % (ref 11.7–14.9)
PLATELET # BLD: 175 K/CU MM (ref 140–440)
PMV BLD AUTO: 10.9 FL (ref 7.5–11.1)
POTASSIUM SERPL-SCNC: 4.3 MMOL/L (ref 3.5–5.1)
RBC # BLD: 3.66 M/CU MM (ref 4.6–6.2)
SEGMENTED NEUTROPHILS ABSOLUTE COUNT: 9.7 K/CU MM
SEGMENTED NEUTROPHILS RELATIVE PERCENT: 84.7 % (ref 36–66)
SODIUM BLD-SCNC: 125 MMOL/L (ref 135–145)
TOTAL IMMATURE NEUTOROPHIL: 0.1 K/CU MM
TOTAL NUCLEATED RBC: 0 K/CU MM
TOTAL PROTEIN: 6.1 GM/DL (ref 6.4–8.2)
WBC # BLD: 11.4 K/CU MM (ref 4–10.5)

## 2021-03-23 PROCEDURE — 99283 EMERGENCY DEPT VISIT LOW MDM: CPT

## 2021-03-23 PROCEDURE — 6360000002 HC RX W HCPCS: Performed by: FAMILY MEDICINE

## 2021-03-23 PROCEDURE — 2580000003 HC RX 258: Performed by: FAMILY MEDICINE

## 2021-03-23 PROCEDURE — 93010 ELECTROCARDIOGRAM REPORT: CPT | Performed by: INTERNAL MEDICINE

## 2021-03-23 PROCEDURE — 96365 THER/PROPH/DIAG IV INF INIT: CPT

## 2021-03-23 PROCEDURE — 83036 HEMOGLOBIN GLYCOSYLATED A1C: CPT

## 2021-03-23 PROCEDURE — 6370000000 HC RX 637 (ALT 250 FOR IP): Performed by: FAMILY MEDICINE

## 2021-03-23 PROCEDURE — 6360000002 HC RX W HCPCS: Performed by: EMERGENCY MEDICINE

## 2021-03-23 PROCEDURE — 2580000003 HC RX 258: Performed by: EMERGENCY MEDICINE

## 2021-03-23 PROCEDURE — 85025 COMPLETE CBC W/AUTO DIFF WBC: CPT

## 2021-03-23 PROCEDURE — 82962 GLUCOSE BLOOD TEST: CPT

## 2021-03-23 PROCEDURE — 93005 ELECTROCARDIOGRAM TRACING: CPT | Performed by: EMERGENCY MEDICINE

## 2021-03-23 PROCEDURE — 80053 COMPREHEN METABOLIC PANEL: CPT

## 2021-03-23 PROCEDURE — 96366 THER/PROPH/DIAG IV INF ADDON: CPT

## 2021-03-23 PROCEDURE — 83605 ASSAY OF LACTIC ACID: CPT

## 2021-03-23 PROCEDURE — 1200000000 HC SEMI PRIVATE

## 2021-03-23 PROCEDURE — 96375 TX/PRO/DX INJ NEW DRUG ADDON: CPT

## 2021-03-23 RX ORDER — MAGNESIUM SULFATE IN WATER 40 MG/ML
2000 INJECTION, SOLUTION INTRAVENOUS PRN
Status: DISCONTINUED | OUTPATIENT
Start: 2021-03-23 | End: 2021-03-29 | Stop reason: HOSPADM

## 2021-03-23 RX ORDER — SODIUM CHLORIDE 0.9 % (FLUSH) 0.9 %
10 SYRINGE (ML) INJECTION PRN
Status: DISCONTINUED | OUTPATIENT
Start: 2021-03-23 | End: 2021-03-29 | Stop reason: HOSPADM

## 2021-03-23 RX ORDER — MORPHINE SULFATE 2 MG/ML
2 INJECTION, SOLUTION INTRAMUSCULAR; INTRAVENOUS
Status: DISCONTINUED | OUTPATIENT
Start: 2021-03-23 | End: 2021-03-29 | Stop reason: HOSPADM

## 2021-03-23 RX ORDER — DEXTROSE MONOHYDRATE 25 G/50ML
12.5 INJECTION, SOLUTION INTRAVENOUS PRN
Status: DISCONTINUED | OUTPATIENT
Start: 2021-03-23 | End: 2021-03-29 | Stop reason: HOSPADM

## 2021-03-23 RX ORDER — SODIUM CHLORIDE 0.9 % (FLUSH) 0.9 %
10 SYRINGE (ML) INJECTION EVERY 12 HOURS SCHEDULED
Status: DISCONTINUED | OUTPATIENT
Start: 2021-03-23 | End: 2021-03-29 | Stop reason: HOSPADM

## 2021-03-23 RX ORDER — KETOROLAC TROMETHAMINE 30 MG/ML
30 INJECTION, SOLUTION INTRAMUSCULAR; INTRAVENOUS ONCE
Status: COMPLETED | OUTPATIENT
Start: 2021-03-23 | End: 2021-03-23

## 2021-03-23 RX ORDER — POTASSIUM CHLORIDE 7.45 MG/ML
10 INJECTION INTRAVENOUS PRN
Status: DISCONTINUED | OUTPATIENT
Start: 2021-03-23 | End: 2021-03-29 | Stop reason: HOSPADM

## 2021-03-23 RX ORDER — ONDANSETRON 2 MG/ML
8 INJECTION INTRAMUSCULAR; INTRAVENOUS ONCE
Status: COMPLETED | OUTPATIENT
Start: 2021-03-23 | End: 2021-03-23

## 2021-03-23 RX ORDER — 0.9 % SODIUM CHLORIDE 0.9 %
1000 INTRAVENOUS SOLUTION INTRAVENOUS ONCE
Status: COMPLETED | OUTPATIENT
Start: 2021-03-23 | End: 2021-03-23

## 2021-03-23 RX ORDER — HYDRALAZINE HYDROCHLORIDE 50 MG/1
50 TABLET, FILM COATED ORAL 3 TIMES DAILY
Status: DISCONTINUED | OUTPATIENT
Start: 2021-03-23 | End: 2021-03-29 | Stop reason: HOSPADM

## 2021-03-23 RX ORDER — NICOTINE POLACRILEX 4 MG
15 LOZENGE BUCCAL PRN
Status: DISCONTINUED | OUTPATIENT
Start: 2021-03-23 | End: 2021-03-29 | Stop reason: HOSPADM

## 2021-03-23 RX ORDER — ONDANSETRON 2 MG/ML
4 INJECTION INTRAMUSCULAR; INTRAVENOUS EVERY 6 HOURS PRN
Status: DISCONTINUED | OUTPATIENT
Start: 2021-03-23 | End: 2021-03-29 | Stop reason: HOSPADM

## 2021-03-23 RX ORDER — TIZANIDINE 2 MG/1
2 TABLET ORAL EVERY 8 HOURS PRN
Status: DISCONTINUED | OUTPATIENT
Start: 2021-03-23 | End: 2021-03-29 | Stop reason: HOSPADM

## 2021-03-23 RX ORDER — PROMETHAZINE HYDROCHLORIDE 12.5 MG/1
12.5 TABLET ORAL EVERY 6 HOURS PRN
Status: DISCONTINUED | OUTPATIENT
Start: 2021-03-23 | End: 2021-03-29 | Stop reason: HOSPADM

## 2021-03-23 RX ORDER — OXYCODONE AND ACETAMINOPHEN 7.5; 325 MG/1; MG/1
1 TABLET ORAL EVERY 6 HOURS PRN
Status: DISCONTINUED | OUTPATIENT
Start: 2021-03-23 | End: 2021-03-29 | Stop reason: HOSPADM

## 2021-03-23 RX ORDER — BLOOD-GLUCOSE METER
1 KIT MISCELLANEOUS ONCE
Status: DISCONTINUED | OUTPATIENT
Start: 2021-03-23 | End: 2021-03-23

## 2021-03-23 RX ORDER — LISINOPRIL 2.5 MG/1
2.5 TABLET ORAL DAILY
Status: DISCONTINUED | OUTPATIENT
Start: 2021-03-23 | End: 2021-03-25

## 2021-03-23 RX ORDER — ONDANSETRON 4 MG/1
4 TABLET, FILM COATED ORAL EVERY 8 HOURS PRN
Status: DISCONTINUED | OUTPATIENT
Start: 2021-03-23 | End: 2021-03-23 | Stop reason: SDUPTHER

## 2021-03-23 RX ORDER — DEXTROSE MONOHYDRATE 50 MG/ML
100 INJECTION, SOLUTION INTRAVENOUS PRN
Status: DISCONTINUED | OUTPATIENT
Start: 2021-03-23 | End: 2021-03-29 | Stop reason: HOSPADM

## 2021-03-23 RX ORDER — ACETAMINOPHEN 325 MG/1
650 TABLET ORAL EVERY 4 HOURS PRN
Status: DISCONTINUED | OUTPATIENT
Start: 2021-03-23 | End: 2021-03-29 | Stop reason: HOSPADM

## 2021-03-23 RX ORDER — PROMETHAZINE HYDROCHLORIDE 12.5 MG/1
12.5 TABLET ORAL EVERY 6 HOURS PRN
Status: DISCONTINUED | OUTPATIENT
Start: 2021-03-23 | End: 2021-03-23 | Stop reason: SDUPTHER

## 2021-03-23 RX ORDER — MORPHINE SULFATE 4 MG/ML
4 INJECTION, SOLUTION INTRAMUSCULAR; INTRAVENOUS
Status: DISCONTINUED | OUTPATIENT
Start: 2021-03-23 | End: 2021-03-29 | Stop reason: HOSPADM

## 2021-03-23 RX ORDER — NICOTINE 21 MG/24HR
1 PATCH, TRANSDERMAL 24 HOURS TRANSDERMAL DAILY
Status: DISCONTINUED | OUTPATIENT
Start: 2021-03-23 | End: 2021-03-29 | Stop reason: HOSPADM

## 2021-03-23 RX ORDER — MORPHINE SULFATE 4 MG/ML
4 INJECTION, SOLUTION INTRAMUSCULAR; INTRAVENOUS ONCE
Status: COMPLETED | OUTPATIENT
Start: 2021-03-23 | End: 2021-03-23

## 2021-03-23 RX ORDER — ASPIRIN 81 MG/1
81 TABLET, CHEWABLE ORAL DAILY
Status: DISCONTINUED | OUTPATIENT
Start: 2021-03-23 | End: 2021-03-29 | Stop reason: HOSPADM

## 2021-03-23 RX ORDER — POTASSIUM CHLORIDE 20 MEQ/1
40 TABLET, EXTENDED RELEASE ORAL PRN
Status: DISCONTINUED | OUTPATIENT
Start: 2021-03-23 | End: 2021-03-29 | Stop reason: HOSPADM

## 2021-03-23 RX ORDER — ONDANSETRON 2 MG/ML
4 INJECTION INTRAMUSCULAR; INTRAVENOUS EVERY 6 HOURS PRN
Status: DISCONTINUED | OUTPATIENT
Start: 2021-03-23 | End: 2021-03-23 | Stop reason: SDUPTHER

## 2021-03-23 RX ORDER — INSULIN GLARGINE 100 [IU]/ML
40 INJECTION, SOLUTION SUBCUTANEOUS NIGHTLY
Status: DISCONTINUED | OUTPATIENT
Start: 2021-03-23 | End: 2021-03-25

## 2021-03-23 RX ADMIN — MORPHINE SULFATE 4 MG: 4 INJECTION, SOLUTION INTRAMUSCULAR; INTRAVENOUS at 01:56

## 2021-03-23 RX ADMIN — SODIUM CHLORIDE 1000 ML: 9 INJECTION, SOLUTION INTRAVENOUS at 01:57

## 2021-03-23 RX ADMIN — INSULIN GLARGINE 40 UNITS: 100 INJECTION, SOLUTION SUBCUTANEOUS at 20:10

## 2021-03-23 RX ADMIN — CEFTRIAXONE 1000 MG: 1 INJECTION, POWDER, FOR SOLUTION INTRAMUSCULAR; INTRAVENOUS at 01:56

## 2021-03-23 RX ADMIN — MORPHINE SULFATE 4 MG: 4 INJECTION, SOLUTION INTRAMUSCULAR; INTRAVENOUS at 22:17

## 2021-03-23 RX ADMIN — ENOXAPARIN SODIUM 40 MG: 40 INJECTION SUBCUTANEOUS at 13:56

## 2021-03-23 RX ADMIN — ONDANSETRON 8 MG: 2 INJECTION INTRAMUSCULAR; INTRAVENOUS at 01:56

## 2021-03-23 RX ADMIN — MORPHINE SULFATE 4 MG: 4 INJECTION, SOLUTION INTRAMUSCULAR; INTRAVENOUS at 07:43

## 2021-03-23 RX ADMIN — VANCOMYCIN HYDROCHLORIDE 2000 MG: 1 INJECTION, POWDER, LYOPHILIZED, FOR SOLUTION INTRAVENOUS at 04:48

## 2021-03-23 RX ADMIN — ONDANSETRON 4 MG: 2 INJECTION INTRAMUSCULAR; INTRAVENOUS at 13:52

## 2021-03-23 RX ADMIN — CEFTRIAXONE 1000 MG: 1 INJECTION, POWDER, FOR SOLUTION INTRAMUSCULAR; INTRAVENOUS at 20:03

## 2021-03-23 RX ADMIN — OXYCODONE HYDROCHLORIDE AND ACETAMINOPHEN 1 TABLET: 7.5; 325 TABLET ORAL at 20:03

## 2021-03-23 RX ADMIN — VANCOMYCIN HYDROCHLORIDE 1500 MG: 5 INJECTION, POWDER, LYOPHILIZED, FOR SOLUTION INTRAVENOUS at 20:44

## 2021-03-23 RX ADMIN — MORPHINE SULFATE 4 MG: 4 INJECTION, SOLUTION INTRAMUSCULAR; INTRAVENOUS at 13:51

## 2021-03-23 RX ADMIN — HYDRALAZINE HYDROCHLORIDE 50 MG: 50 TABLET ORAL at 13:56

## 2021-03-23 RX ADMIN — ONDANSETRON 4 MG: 2 INJECTION INTRAMUSCULAR; INTRAVENOUS at 22:21

## 2021-03-23 RX ADMIN — ASPIRIN 81 MG CHEWABLE TABLET 81 MG: 81 TABLET CHEWABLE at 13:56

## 2021-03-23 RX ADMIN — INSULIN LISPRO 4 UNITS: 100 INJECTION, SOLUTION INTRAVENOUS; SUBCUTANEOUS at 13:49

## 2021-03-23 RX ADMIN — SODIUM CHLORIDE, PRESERVATIVE FREE 10 ML: 5 INJECTION INTRAVENOUS at 20:03

## 2021-03-23 RX ADMIN — LISINOPRIL 2.5 MG: 2.5 TABLET ORAL at 13:56

## 2021-03-23 RX ADMIN — MORPHINE SULFATE 4 MG: 4 INJECTION, SOLUTION INTRAMUSCULAR; INTRAVENOUS at 18:56

## 2021-03-23 RX ADMIN — SODIUM CHLORIDE, PRESERVATIVE FREE 10 ML: 5 INJECTION INTRAVENOUS at 07:43

## 2021-03-23 RX ADMIN — KETOROLAC TROMETHAMINE 30 MG: 30 INJECTION, SOLUTION INTRAMUSCULAR; INTRAVENOUS at 01:56

## 2021-03-23 RX ADMIN — HYDRALAZINE HYDROCHLORIDE 50 MG: 50 TABLET ORAL at 20:03

## 2021-03-23 RX ADMIN — INSULIN LISPRO 2 UNITS: 100 INJECTION, SOLUTION INTRAVENOUS; SUBCUTANEOUS at 18:50

## 2021-03-23 ASSESSMENT — PAIN SCALES - GENERAL
PAINLEVEL_OUTOF10: 9
PAINLEVEL_OUTOF10: 7
PAINLEVEL_OUTOF10: 9
PAINLEVEL_OUTOF10: 8
PAINLEVEL_OUTOF10: 0
PAINLEVEL_OUTOF10: 4
PAINLEVEL_OUTOF10: 4

## 2021-03-23 ASSESSMENT — PAIN DESCRIPTION - LOCATION
LOCATION: FOOT
LOCATION: FOOT

## 2021-03-23 ASSESSMENT — PAIN DESCRIPTION - FREQUENCY
FREQUENCY: CONTINUOUS
FREQUENCY: CONTINUOUS

## 2021-03-23 ASSESSMENT — PAIN DESCRIPTION - PAIN TYPE: TYPE: ACUTE PAIN

## 2021-03-23 ASSESSMENT — PAIN DESCRIPTION - ORIENTATION: ORIENTATION: LEFT

## 2021-03-23 ASSESSMENT — PAIN DESCRIPTION - ONSET: ONSET: ON-GOING

## 2021-03-23 ASSESSMENT — PAIN - FUNCTIONAL ASSESSMENT
PAIN_FUNCTIONAL_ASSESSMENT: PREVENTS OR INTERFERES SOME ACTIVE ACTIVITIES AND ADLS

## 2021-03-23 ASSESSMENT — PAIN DESCRIPTION - PROGRESSION
CLINICAL_PROGRESSION: NOT CHANGED
CLINICAL_PROGRESSION: NOT CHANGED

## 2021-03-23 ASSESSMENT — PAIN DESCRIPTION - DESCRIPTORS
DESCRIPTORS: ACHING;DISCOMFORT;CRAMPING
DESCRIPTORS: BURNING;CONSTANT;DISCOMFORT

## 2021-03-23 NOTE — CONSULTS
[id number] [facility]  consulted by Dr. David Land for monitoring and adjustment. Indication for treatment: Cellulitis of the lower extremity  Goal trough: 10-15 mcg/mL    Pertinent Laboratory Values:   Temp Readings from Last 3 Encounters:   03/23/21 98.7 °F (37.1 °C) (Oral)   03/22/21 98.4 °F (36.9 °C) (Oral)   11/25/19 98.2 °F (36.8 °C) (Oral)     Recent Labs     03/23/21  0153   WBC 11.4*   LACTATE 1.2     Recent Labs     03/23/21  0153   BUN 30*   CREATININE 1.9*     Estimated Creatinine Clearance: 64 mL/min (A) (based on SCr of 1.9 mg/dL (H)). Intake/Output Summary (Last 24 hours) at 3/23/2021 1109  Last data filed at 3/23/2021 0743  Gross per 24 hour   Intake 20 ml   Output --   Net 20 ml       Pertinent Cultures:  Date    Source    Results  none                    Vancomycin level:   TROUGH:  No results for input(s): VANCOTROUGH in the last 72 hours. RANDOM:  No results for input(s): VANCORANDOM in the last 72 hours. Assessment:  WBC and temperature: WBC slightly elevated @11.4, Pt afebrile  SCr, BUN, and urine output: CARMEN, SCR elevated @1.9, no UOP data  Hx of MSSA and osteo in foot wound, diabetic patient   Day(s) of therapy:  1  Vancomycin concentration: scheduled for 3/25 @09:30    Plan:  The patient received vancomycin 2000mg ivpb x1 dose earlier today  Start vancomycin 1,500mg ivpb q18h tonight @22:00  Check the trough before the 4th vancomycin dose  Pharmacy will continue to monitor patient and adjust therapy as indicated    VANCOMYCIN CONCENTRATION SCHEDULED FOR 3/25 @09:30    Thank you for the consult. Marielle Beard   3/23/2021 11:09 AM

## 2021-03-23 NOTE — CARE COORDINATION
Met c pt to initiate discharge planning. Pt lives at home c spouse, pt typically ind with ADLs. Pt has pcp/ active insurance and can afford meds. Discussed pt's possible discharge needs. Pt states he feels he will need at least [facility] again and would like to use [facility] as pt has had them previously. Pt has had wound vac at home previously and wet/dry dressings as well. Pt feels comfortable completing any wound care plan that might be needed at home and does not feel SNF would be needed. Advised CM will cont to follow and can evolve plan as needed. Referral sent to [facility] to follow, spoke with intake and referral accepted however will need to confirm specific [facility] needs.

## 2021-03-23 NOTE — ED NOTES
Bed: ED-20  Expected date:   Expected time:   Means of arrival:   Comments:  Denny 28, [address]  03/23/21 6770

## 2021-03-23 NOTE — ED TRIAGE NOTES
Pt states having L leg pain that has been going on for the couple of days. Reports swelling as well. Pt states the pain is from ankle to groin region. Pt with hx of infection on this foot and has a couple toes amputated.

## 2021-03-23 NOTE — PROGRESS NOTES
Night Shift RN Notes:  Complete two person skin assessment was performed by this RN and VERA Tim. Left Lower extrem: Fluid-filled blister with some surrounding erythema to the plantar aspect of the foot distally located adjacent to the second and third toes. Erythema and warmth with some induration to the distal leg extending into the foot and are currently with minimal bloody output. Palpable DP pulse. Decreased sensation to light touch of the foot. Shazia-area, groin, buttocks are red and with rashes. Oriented to unit and updated of plan of care. Safety assured, fall kit applied, yellow socks, blanket, jewels, low bed, bed alarm at zone 2, call light and phone within reach. Side table with reach. Education on safety provided. Questions answered. No s/s of distress and discomforts. Shannan Epley A. Ren Chance

## 2021-03-23 NOTE — ED PROVIDER NOTES
Triage Chief Complaint:   Leg Pain (L sided )    [place]izen [place]:  Ana Laura Bone is a 39 y.o. male that presents with left leg pain, redness, swelling. Patient states that he has had symptoms for about 3 days. States that most of the pain is located around his ankle and foot and is burning in nature. States that he has some tightness in his groin. Denies any alleviating or exacerbating factors. He has some nausea and vomiting as well. Denies any fevers. States that he has diabetic neuropathy and limited sensation in his feet. ROS:  At least 10 systems reviewed and otherwise acutely negative except as in the [address] Past Medical History:   Diagnosis Date    Abscess 2010    scrotal    Acute renal failure (ARF) ([address] 8/4/2019    Anxiety associated with depression     Anxiety associated with depression     Back pain 7/2/2012    Chest pain 5/1/2013    Diabetic nephropathy ([address]     Diabetic neuropathy ([address] 12/22/2011    Diabetic ulcer of left midfoot associated with type 2 diabetes mellitus, with fat layer exposed ([address] 7/18/2017    Diverticulosis     C scope + Dr. Pam Zhao DM (diabetes mellitus), type 2 ([address] 2002    DR. Turner podiatry    Hyperlipidemia LDL goal < 100 7/15/2013    Hypertension     Internal hemorrhoid     C scope + Dr. Whaley Sour fracture 1988    Panic attacks     Pericarditis 2003    Hospitalized with s/p heart cath normal    Peripheral autonomic neuropathy due to diabetes mellitus ([address]     Axonal EMG- NCS, March 2011    Sebaceous cyst 9/1/2011    URI (upper respiratory infection) 2/27/2012    WD-Wound, surgical, infected, initial encounter 12/8/2017    Wrist fracture 1986     Past Surgical History:   Procedure Laterality Date   [address] 2003, 2013    Normal (Dr. Owen Massey)    COLONOSCOPY  6/2/2011    Pandiverticulosis, Nonbleeding internal hemorrhoids, Repeat colonoscopy at age 48- Dr. Blaine Habermann    \"had reconstruction surgery on nose a year after the other nose surgery\"    OTHER SURGICAL HISTORY Right 05/22/2015    I & D right great toe with partial amputation    OTHER SURGICAL HISTORY  12/04/2017    inc and drainage of abcess    MN DEEP INCIS FOOT BONE INFECTN Left 9/22/2018    LEFT FOOT DEBRIDEMENT INCISION AND DRAINAGE TOP AND BOTTOM performed by Penny Devries DPM at [address] [place] [address] [phone]    sebaceous cyst removal times 4     TOE AMPUTATION      right great toe    TOE AMPUTATION Right 3/23/2019    TOE AMPUTATION RIGHT 5TH TOE performed by Penny Devries DPM at [facility] TOE AMPUTATION Right 8/6/2019    TOE AMPUTATION RIGHT 4TH TOE performed by Penny Devries DPM at [address] [place] [address] UPPER GASTROINTESTINAL ENDOSCOPY  06/2/2011    Mild gastritis with moderatley severe antritis, small hiatal hernia, Dr. Tonya Stanley N/A 1/12/2019    EGD BIOPSY performed by Faith Hardy MD at [facility] History   Problem Relation Age of Onset   [facility] Cancer Mother         ?site   [facility] Stroke Mother     Bleeding Prob Mother     Diabetes Father     Heart Disease Father     High Blood Pressure Father     Obesity Father     Kidney Disease Father     Diabetes Sister     High Blood Pressure Sister     Mental Illness Sister     Obesity Sister     High Blood Pressure Other     Mental Illness Other         bipolar    Other Son         cyst in ear canal    ADHD Daughter      Social History     Socioeconomic History    Marital status:      Spouse name: Not on file    Number of children: Not on file    Years of education: Not on file    Highest education level: Not on file   Occupational History    Not on file   Social Needs    Financial resource strain: Not on file    Food insecurity     Worry: Not on file     Inability: Not on file    Transportation needs     Medical: Not on file     Non-medical: Not on file   Tobacco Use    Smoking status: Former Smoker     Years: 20.00     Quit date: 11/18/2017     Years since quitting: 3.3    Smokeless tobacco: Never Used   Substance and Sexual Activity    Alcohol use: Yes     Comment: occ    Drug use: Yes     Frequency: 7.0 times per week     Types: Marijuana    Sexual activity: Yes   Lifestyle    Physical activity     Days per week: Not on file     Minutes per session: Not on file    Stress: Not on file   Relationships    Social connections     Talks on phone: Not on file     Gets together: Not on file     Attends Voodoo service: Not on file     Active member of club or organization: Not on file     Attends meetings of clubs or organizations: Not on file     Relationship status: Not on file    Intimate partner violence     Fear of current or ex partner: Not on file     Emotionally abused: Not on file     Physically abused: Not on file     Forced sexual activity: Not on file   Other Topics Concern    Not on file   Social History Narrative    Not on file     Current Facility-Administered Medications   Medication Dose Route Frequency Provider Last Rate Last Admin    vancomycin (VANCOCIN) 2,000 mg in dextrose 5 % 500 mL IVPB  2,000 mg Intravenous Once Zac Edgar MD         Current Outpatient Medications   Medication Sig Dispense Refill    ibuprofen (ADVIL;MOTRIN) 600 MG tablet Take 1 tablet by mouth every 6 hours as needed for Pain 30 tablet 0    insulin glargine (LANTUS) 100 UNIT/ML injection vial Inject 40 Units into the skin nightly 1 vial 0    insulin lispro (HUMALOG) 100 UNIT/ML injection vial Inject 25 Units into the skin 3 times daily (with meals) 1 vial 0    linagliptin (TRADJENTA) 5 MG tablet Take 1 tablet by mouth daily 30 tablet 0    metFORMIN (GLUCOPHAGE) 1000 MG tablet Take 1 tablet by mouth 2 times daily (with meals) 60 tablet 0    hydrALAZINE (APRESOLINE) 50 MG tablet Take 1 tablet by mouth 3 times daily 90 tablet 0    tiZANidine (ZANAFLEX) 2 MG tablet Take 2 mg by with some induration to the distal leg extending into the foot. Palpable DP pulse. Decreased sensation to light touch of the foot  SKIN: Dry  NEUROLOGICAL: No gross facial drooping. Moves all 4 extremities spontaneously. PSYCHIATRIC: Normal mood.     I have reviewed and interpreted all of the currently available lab results from this visit (if applicable):  Results for orders placed or performed during the hospital encounter of 03/23/21   CBC Auto Differential   Result Value Ref Range    WBC 11.4 (H) 4.0 - 10.5 K/CU MM    RBC 3.66 (L) 4.6 - 6.2 M/CU MM    Hemoglobin 11.2 (L) 13.5 - 18.0 GM/DL    Hematocrit 32.8 (L) 42 - 52 %    MCV 89.6 78 - 100 FL    MCH 30.6 27 - 31 PG    MCHC 34.1 32.0 - 36.0 %    RDW 12.3 11.7 - 14.9 %    Platelets 958 862 - 411 K/CU MM    MPV 10.9 7.5 - 11.1 FL    Differential Type AUTOMATED DIFFERENTIAL     Segs Relative 84.7 (H) 36 - 66 %    Lymphocytes % 6.2 (L) 24 - 44 %    Monocytes % 7.6 (H) 0 - 4 %    Eosinophils % 0.2 0 - 3 %    Basophils % 0.4 0 - 1 %    Segs Absolute 9.7 K/CU MM    Lymphocytes Absolute 0.7 K/CU MM    Monocytes Absolute 0.9 K/CU MM    Eosinophils Absolute 0.0 K/CU MM    Basophils Absolute 0.0 K/CU MM    Nucleated RBC % 0.0 %    Total Nucleated RBC 0.0 K/CU MM    Total Immature Neutrophil 0.10 K/CU MM    Immature Neutrophil % 0.9 (H) 0 - 0.43 %   Comprehensive Metabolic Panel w/ Reflex to MG   Result Value Ref Range    Sodium 125 (L) 135 - 145 MMOL/L    Potassium 4.3 3.5 - 5.1 MMOL/L    Chloride 93 (L) 99 - 110 mMol/L    CO2 24 21 - 32 MMOL/L    BUN 30 (H) 6 - 23 MG/DL    CREATININE 1.9 (H) 0.9 - 1.3 MG/DL    Glucose 301 (H) 70 - 99 MG/DL    Calcium 8.0 (L) 8.3 - 10.6 MG/DL    Albumin 2.2 (L) 3.4 - 5.0 GM/DL    Total Protein 6.1 (L) 6.4 - 8.2 GM/DL    Total Bilirubin 0.8 0.0 - 1.0 MG/DL    ALT 15 10 - 40 U/L    AST 20 15 - 37 IU/L    Alkaline Phosphatase 76 40 - 128 IU/L    GFR Non- 39 (L) >60 mL/min/1.73m2    GFR  47 (L) >60 mL/min/1.73m2    Anion Gap 8 4 - 16   Lactic Acid, Plasma   Result Value Ref Range    Lactate 1.2 0.4 - 2.0 mMOL/L      Radiographs (if obtained):  [] The following radiograph was interpreted by myself in the absence of a radiologist:  [] Radiologist's Report Reviewed:    EKG (if obtained): (All EKG's are interpreted by myself in the absence of a cardiologist)    MDM:  Plan of care is discussed thoroughly with the patient and family if present. If performed, all imaging and lab work also discussed with patient. All relevant prior results and chart reviewed if available. Patient presents as above. Vital signs are within appropriate ranges. He does not appear acutely ill but does have developing cellulitis of his left lower extremity and some blister/ulceration developing on the plantar aspect of his foot. There is no crepitus or fluctuance on exam.  Low suspicion for necrotizing fasciitis at this time. Patient's glucoses have been running in the 300s. He is started on IV fluids, given Zofran, Toradol, morphine for symptoms. Patient given vancomycin and Rocephin for antibiotic treatment. Metabolic work-up significant for hyperglycemia without evidence of DKA. He does have some hyponatremia. Lactate is normal. Patient admitted by Dr. Missie Moritz for further management. Clinical Impression:  1. Cellulitis of left lower extremity    2. Hyperglycemia    3.  Hyponatremia      (Please note that portions of this note may have been completed with a voice recognition program. Efforts were made to edit the dictations but occasionally words are mis-transcribed.)    MD Kenneth Goodwin MD  03/23/21 [id number]

## 2021-03-23 NOTE — H&P
HISTORY AND PHYSICAL    3/23/2021     Patient Information:    Patient: Etienne Suarez     Gender: male  : 1975   Age: 39 y.o. MRN: [id number]    Pt`s preferred phone number :[phone]  [facility]  Extended Emergency Contact Information  Primary Emergency Contact: [facility]  Address: [address] [place] [address] [address] Phone: [phone]  Mobile Phone: [phone]  Relation: Spouse  Secondary Emergency Contact: Kandis Riedel, [address] [facility]  Home Phone: [phone]  Mobile Phone: [phone]  Relation: Parent        PCP:  Caridad Owens MD (Tel: [phone] )    Chief complaint:    Chief Complaint   Patient presents with    Leg Pain     L sided       Lab Results   Component Value Date    LABA1C 9.0 (H) 2019       Lab Results   Component Value Date    LVEF 58 2019       Body mass index is 40.54 kg/m². History of Present Illness:  Etienne Suarez is a 39 y.o. male with   has a past medical history of Abscess, Acute renal failure (ARF) ([address] Anxiety associated with depression, Anxiety associated with depression, Back pain, Chest pain, Diabetic nephropathy ([address] Diabetic neuropathy ([address] Diabetic ulcer of left midfoot associated with type 2 diabetes mellitus, with fat layer exposed ([address] Diverticulosis, DM (diabetes mellitus), type 2 ([address] Hyperlipidemia LDL goal < 100, Hypertension, Internal hemorrhoid, Nose fracture, Panic attacks, Pericarditis, Peripheral autonomic neuropathy due to diabetes mellitus ([address] Sebaceous cyst, URI (upper respiratory infection), WD-Wound, surgical, infected, initial encounter, and Wrist fracture. who presented to ER with 3 days of left leg redness, pain and swelling . Also pt has   History obtained from patient . REVIEW OF SYSTEMS:   Constitutional: Negative for fever,chills .   ENT: Negative for rhinorrhea, sore throat. Respiratory: Negative for cough, shortness of breath,wheezing  Cardiovascular: Negative for chest pain, palpitations   Gastrointestinal: Negative for Abdominal pain, nausea, vomiting, diarrhea  Genitourinary: Negative for polyuria, dysuria   Hematologic/Lymphatic: Negative for bleeding tendency, easy bruising  Musculoskeletal: Negative for myalgias and arthralgias  Neurologic: Negative for confusion,dysarthria. Skin: Negative for itching,rash  Psychiatric: Negative for depression,anxiety, agitation. Endocrine: Negative for polydipsia,polyuria,heat /cold intolerance. Past Medical History:   has a past medical history of Abscess, Acute renal failure (ARF) ([address] Anxiety associated with depression, Anxiety associated with depression, Back pain, Chest pain, Diabetic nephropathy ([address] Diabetic neuropathy ([address] Diabetic ulcer of left midfoot associated with type 2 diabetes mellitus, with fat layer exposed ([address] Diverticulosis, DM (diabetes mellitus), type 2 ([address] Hyperlipidemia LDL goal < 100, Hypertension, Internal hemorrhoid, Nose fracture, Panic attacks, Pericarditis, Peripheral autonomic neuropathy due to diabetes mellitus ([address] Sebaceous cyst, URI (upper respiratory infection), WD-Wound, surgical, infected, initial encounter, and Wrist fracture. Past Surgical History:   has a past surgical history that includes Cardiac catheterization (2003, 2013); Tonsillectomy and adenoidectomy (1988); Upper gastrointestinal endoscopy (06/2/2011); Colonoscopy (6/2/2011); Nose surgery (1993); skin biopsy (N/A, [id number]); other surgical history (Right, 05/22/2015); Toe amputation; Abdomen surgery; other surgical history (12/04/2017); pr deep incis foot bone infectn (Left, 9/22/2018); Upper gastrointestinal endoscopy (N/A, 1/12/2019); Toe amputation (Right, 3/23/2019); and Toe amputation (Right, 8/6/2019). Medications:  No current facility-administered medications on file prior to encounter. Current Outpatient Medications on File Prior to Encounter   Medication Sig Dispense Refill    ibuprofen (ADVIL;MOTRIN) 600 MG tablet Take 1 tablet by mouth every 6 hours as needed for Pain 30 tablet 0    insulin glargine (LANTUS) 100 UNIT/ML injection vial Inject 40 Units into the skin nightly 1 vial 0    insulin lispro (HUMALOG) 100 UNIT/ML injection vial Inject 25 Units into the skin 3 times daily (with meals) 1 vial 0    linagliptin (TRADJENTA) 5 MG tablet Take 1 tablet by mouth daily 30 tablet 0    metFORMIN (GLUCOPHAGE) 1000 MG tablet Take 1 tablet by mouth 2 times daily (with meals) 60 tablet 0    hydrALAZINE (APRESOLINE) 50 MG tablet Take 1 tablet by mouth 3 times daily 90 tablet 0    tiZANidine (ZANAFLEX) 2 MG tablet Take 2 mg by mouth every 8 hours as needed (muscle spasms)      lidocaine (LIDODERM) 5 % Place 1 patch onto the skin daily 12 hours on, 12 hours off.  lisinopril (PRINIVIL;ZESTRIL) 2.5 MG tablet Take 2.5 mg by mouth daily      oxyCODONE-acetaminophen (PERCOCET) 7.5-325 MG per tablet Take 1 tablet by mouth every 6 hours as needed for Pain. .      ondansetron (ZOFRAN) 4 MG tablet Take 1 tablet by mouth every 8 hours as needed for Nausea or Vomiting 20 tablet 0    aspirin 81 MG chewable tablet Take 1 tablet by mouth daily 30 tablet 3    Lancets MISC 1 each by Does not apply route daily 100 each 3    Insulin Pen Needle 32G X 5 MM MISC 1 each by Does not apply route daily 100 each 3    glucose monitoring kit (FREESTYLE) monitoring kit 1 each by Does not apply route once for 1 dose. 1 kit 0       Allergies: Allergies   Allergen Reactions    Adhesive Tape Rash    Doxycycline Nausea And Vomiting    Reglan [Metoclopramide] Anxiety        Social History:   reports that he quit smoking about 3 years ago. He quit after 20.00 years of use. He has never used smokeless tobacco. He reports current alcohol use. He reports current drug use. Frequency: 7.00 times per week.  Drug: Marijuana. Family History:  family history includes ADHD in his daughter; Bleeding Prob in his mother; Cancer in his mother; Diabetes in his father and sister; Heart Disease in his father; High Blood Pressure in his father, sister, and another family member; Kidney Disease in his father; Mental Illness in his sister and another family member; Obesity in his father and sister; Other in his son; Stroke in his mother. ,     Immunization History   Administered Date(s) Administered    Influenza 10/15/2013    Influenza, Reyesradha Muñoz, 6 mo and older, IM, PF (Flulaval, Fluarix) 09/19/2018    Pneumococcal Polysaccharide ([id number]) 05/02/2013       Physical Exam:  BP (!) 154/94   Pulse 90   Temp 98.7 °F (37.1 °C) (Oral)   Resp 16   Ht 5' 9\" (1.753 m)   Wt 274 lb 8 oz (124.5 kg)   SpO2 95%   BMI 40.54 kg/m²     General appearance:  no distress . Well nourished  Eyes: Sclera clear, pupils equal  Cardiovascular: Regular rhythm, normal S1, S2. No edema in lower extremities  Respiratory: Clear to auscultation bilaterally, no wheeze, good inspiratory effort  Gastrointestinal: Abdomen soft, non-tender, not distended, normal bowel sounds  Musculoskeletal: right leg swelling No cyanosis in digits, neck supple  Neurology: Cranial nerves grossly intact. Alert and oriented . No speech or motor deficits  Psychiatry: Appropriate affect.  Not agitated  Skin: Warm, dry, normal turgor, no rash    Labs:  CBC:   Lab Results   Component Value Date    WBC 11.4 03/23/2021    RBC 3.66 03/23/2021    HGB 11.2 03/23/2021    HCT 32.8 03/23/2021    MCV 89.6 03/23/2021    MCH 30.6 03/23/2021    MCHC 34.1 03/23/2021    RDW 12.3 03/23/2021     03/23/2021    MPV 10.9 03/23/2021     BMP:    Lab Results   Component Value Date     03/23/2021    K 4.3 03/23/2021    CL 93 03/23/2021    CO2 24 03/23/2021    BUN 30 03/23/2021    CREATININE 1.9 03/23/2021    CALCIUM 8.0 03/23/2021    GFRAA 47 03/23/2021    LABGLOM 39 03/23/2021    GLUCOSE 301 03/23/2021       Chest Xray:   EKG:    I visualized CXR images and EKG strips      Patient Active Problem List   Diagnosis Code    Hiatal hernia K44.9    Diabetes mellitus type 2, improved controlled E11.65    Diabetic neuropathy (HCC) E11.40    Panic attack F41.0    Anxiety associated with depression F41.8    Neck pain M54.2    DANIELA (obstructive sleep apnea) G47.33    Urinary frequency R35.0    Bilateral carpal tunnel syndrome- left worse than right G56.03    Hyperlipidemia with target LDL less than 100 E78.5    HTN, goal below 140/90 I10    Bronchitis J40    Osteomyelitis ([address] M86.9    Diabetic ulcer of left midfoot ([address] E11.621, L97.429    WD-Diabetic ulcer of left midfoot associated with type 2 diabetes mellitus, with fat layer exposed ([address] E11.621, L97.422    Abscess C33.25    Periumbilical hernia R92.3    WD-Wound, surgical, infected, initial encounter [id number]    Sepsis ([address] A41.9    Cellulitis of left foot L03. 116    Constipation K59.00    Intractable nausea and vomiting R11.2    Uncontrolled type 2 diabetes mellitus (HCC) E11.65    Nausea and vomiting R11.2    Diabetic foot infection ([facility]) E11.628, L08.9    Acute renal failure (ARF) ([facility]) N17.9    Osteomyelitis of fourth toe of right foot ([facility]) M86.9    Cellulitis L03.90         Active Hospital Problems    Diagnosis    Cellulitis [L03.90]   diabetic foot  DM II    Obesity           Assessment/Plan:   Tele   IV ABX   IVF  Pain control  Glucose control   Home meds , reviewed and resumed as appropriate   Symptoms releif/Pain control  DVT proph           Stefani Arreguin MD    3/23/2021 10:51 AM

## 2021-03-23 NOTE — PROGRESS NOTES
Comprehensive Nutrition Assessment    Type and Reason for Visit:  Initial    Nutrition Recommendations/Plan:   Modified diet to Carb Control 4   Add Low Marcial High Protein ONS BID     Nutrition Assessment:  Pt admitted for cellutitis, hyperglycemia, and hyponatremia. PMH of DM, toe amputation. Pt was on General diet, adjusted to CC at visit to follow home diet. Spoke with pt at visit. Pt had felt nausea, loss of appetite, po intake 0-25% and pain. Pt breakfast tray untouched. No wt loss noted, pt stated wanting to loss weight with goal between 230 to 240 lbs but has only been 250 # at lowest. Pt stated goal of BG between 130-140 but has averages of 200's or more. Encourage three meals per day with small snacks if needed. Pt at high nutrition risk. Estimated Daily Nutrient Needs:  Energy (kcal):  9541-8331 (1-1.2 stress factor); Weight Used for Energy Requirements:  Current     Protein (g):   (1.2-1.4 g/kg IBW); Weight Used for Protein Requirements:  Ideal        Fluid (ml/day):  7369-5247; Method Used for Fluid Requirements:  1 ml/kcal      Nutrition Related Findings:  POC Glu 395, Glu 301, Na 125, GFR 39 Alb 2.2 WBC 11.4; Pt noted uses insulin at home. Encourage to f/u with PCP.      Wounds:  None       Current Nutrition Therapies:    DIET CARB CONTROL; Carb Control: 4 carb choices (60 gms)/meal    Anthropometric Measures:  · Height: 5' 9\" (175.3 cm)  · Current Body Weight: 274 lb 8 oz (124.5 kg)   · Admission Body Weight: 274 lb (124.3 kg)    · Usual Body Weight: 270 lb (122.5 kg)     · Ideal Body Weight: 160 lbs; % Ideal Body Weight 171.6 %   · BMI: 40.5  · Adjusted Body Weight:  ; No Adjustment   · Adjusted BMI:      · BMI Categories: Obese Class 3 (BMI 40.0 or greater)       Nutrition Diagnosis:   · Inadequate protein-energy intake related to pain, other (comment)(hyperglycemia) as evidenced by poor intake prior to admission, nausea      Nutrition Interventions:   Food and/or Nutrient Delivery: Modify Current Diet, Start Oral Nutrition Supplement  Nutrition Education/Counseling:  No recommendation at this time   Coordination of Nutrition Care:  Continue to monitor while inpatient, Coordination of Community Care    Goals:  Pt will consume at least 50% of meals and supplements       Nutrition Monitoring and Evaluation:   Behavioral-Environmental Outcomes:  None Identified   Food/Nutrient Intake Outcomes:  Food and Nutrient Intake, Supplement Intake  Physical Signs/Symptoms Outcomes:  Biochemical Data, Weight, Nausea or Vomiting     Discharge Planning:     Too soon to determine     Electronically signed by Dwayne Oscar RD, LD on 3/23/21 at 12:48 PM EDT    Contact: [phone]

## 2021-03-24 ENCOUNTER — APPOINTMENT (OUTPATIENT)
Dept: GENERAL RADIOLOGY | Age: 46
DRG: 638 | End: 2021-03-24
Payer: MEDICARE

## 2021-03-24 LAB
ANION GAP SERPL CALCULATED.3IONS-SCNC: 13 MMOL/L (ref 4–16)
BASOPHILS ABSOLUTE: 0.1 K/CU MM
BASOPHILS RELATIVE PERCENT: 0.5 % (ref 0–1)
BUN BLDV-MCNC: 41 MG/DL (ref 6–23)
CALCIUM SERPL-MCNC: 7.5 MG/DL (ref 8.3–10.6)
CHLORIDE BLD-SCNC: 96 MMOL/L (ref 99–110)
CO2: 24 MMOL/L (ref 21–32)
CREAT SERPL-MCNC: 2.5 MG/DL (ref 0.9–1.3)
DIFFERENTIAL TYPE: ABNORMAL
EOSINOPHILS ABSOLUTE: 0.2 K/CU MM
EOSINOPHILS RELATIVE PERCENT: 1.8 % (ref 0–3)
GFR AFRICAN AMERICAN: 34 ML/MIN/1.73M2
GFR NON-AFRICAN AMERICAN: 28 ML/MIN/1.73M2
GLUCOSE BLD-MCNC: 173 MG/DL (ref 70–99)
GLUCOSE BLD-MCNC: 179 MG/DL (ref 70–99)
GLUCOSE BLD-MCNC: 237 MG/DL (ref 70–99)
GLUCOSE BLD-MCNC: 255 MG/DL (ref 70–99)
GLUCOSE BLD-MCNC: 279 MG/DL (ref 70–99)
HCT VFR BLD CALC: 31.3 % (ref 42–52)
HEMOGLOBIN: 10.8 GM/DL (ref 13.5–18)
IMMATURE NEUTROPHIL %: 1.2 % (ref 0–0.43)
LYMPHOCYTES ABSOLUTE: 0.8 K/CU MM
LYMPHOCYTES RELATIVE PERCENT: 7.5 % (ref 24–44)
MCH RBC QN AUTO: 31 PG (ref 27–31)
MCHC RBC AUTO-ENTMCNC: 34.5 % (ref 32–36)
MCV RBC AUTO: 89.9 FL (ref 78–100)
MONOCYTES ABSOLUTE: 1.1 K/CU MM
MONOCYTES RELATIVE PERCENT: 10.2 % (ref 0–4)
NUCLEATED RBC %: 0 %
PDW BLD-RTO: 12.7 % (ref 11.7–14.9)
PLATELET # BLD: 201 K/CU MM (ref 140–440)
PMV BLD AUTO: 10.8 FL (ref 7.5–11.1)
POTASSIUM SERPL-SCNC: 3.8 MMOL/L (ref 3.5–5.1)
RBC # BLD: 3.48 M/CU MM (ref 4.6–6.2)
SEGMENTED NEUTROPHILS ABSOLUTE COUNT: 8.5 K/CU MM
SEGMENTED NEUTROPHILS RELATIVE PERCENT: 78.8 % (ref 36–66)
SODIUM BLD-SCNC: 133 MMOL/L (ref 135–145)
TOTAL IMMATURE NEUTOROPHIL: 0.13 K/CU MM
TOTAL NUCLEATED RBC: 0 K/CU MM
WBC # BLD: 10.8 K/CU MM (ref 4–10.5)

## 2021-03-24 PROCEDURE — 80048 BASIC METABOLIC PNL TOTAL CA: CPT

## 2021-03-24 PROCEDURE — 82962 GLUCOSE BLOOD TEST: CPT

## 2021-03-24 PROCEDURE — 1200000000 HC SEMI PRIVATE

## 2021-03-24 PROCEDURE — 6360000002 HC RX W HCPCS: Performed by: FAMILY MEDICINE

## 2021-03-24 PROCEDURE — 87070 CULTURE OTHR SPECIMN AEROBIC: CPT

## 2021-03-24 PROCEDURE — 87075 CULTR BACTERIA EXCEPT BLOOD: CPT

## 2021-03-24 PROCEDURE — 6370000000 HC RX 637 (ALT 250 FOR IP): Performed by: FAMILY MEDICINE

## 2021-03-24 PROCEDURE — 99211 OFF/OP EST MAY X REQ PHY/QHP: CPT

## 2021-03-24 PROCEDURE — 0HBNXZZ EXCISION OF LEFT FOOT SKIN, EXTERNAL APPROACH: ICD-10-PCS | Performed by: PODIATRIST

## 2021-03-24 PROCEDURE — 2580000003 HC RX 258: Performed by: FAMILY MEDICINE

## 2021-03-24 PROCEDURE — 82565 ASSAY OF CREATININE: CPT

## 2021-03-24 PROCEDURE — 73630 X-RAY EXAM OF FOOT: CPT

## 2021-03-24 PROCEDURE — 87077 CULTURE AEROBIC IDENTIFY: CPT

## 2021-03-24 PROCEDURE — 85025 COMPLETE CBC W/AUTO DIFF WBC: CPT

## 2021-03-24 PROCEDURE — 36415 COLL VENOUS BLD VENIPUNCTURE: CPT

## 2021-03-24 PROCEDURE — 87186 SC STD MICRODIL/AGAR DIL: CPT

## 2021-03-24 PROCEDURE — 94761 N-INVAS EAR/PLS OXIMETRY MLT: CPT

## 2021-03-24 RX ADMIN — LISINOPRIL 2.5 MG: 2.5 TABLET ORAL at 10:37

## 2021-03-24 RX ADMIN — MORPHINE SULFATE 4 MG: 4 INJECTION, SOLUTION INTRAMUSCULAR; INTRAVENOUS at 18:49

## 2021-03-24 RX ADMIN — MORPHINE SULFATE 4 MG: 4 INJECTION, SOLUTION INTRAMUSCULAR; INTRAVENOUS at 14:11

## 2021-03-24 RX ADMIN — INSULIN LISPRO 3 UNITS: 100 INJECTION, SOLUTION INTRAVENOUS; SUBCUTANEOUS at 10:39

## 2021-03-24 RX ADMIN — OXYCODONE HYDROCHLORIDE AND ACETAMINOPHEN 1 TABLET: 7.5; 325 TABLET ORAL at 15:35

## 2021-03-24 RX ADMIN — INSULIN LISPRO 1 UNITS: 100 INJECTION, SOLUTION INTRAVENOUS; SUBCUTANEOUS at 18:48

## 2021-03-24 RX ADMIN — ASPIRIN 81 MG CHEWABLE TABLET 81 MG: 81 TABLET CHEWABLE at 10:37

## 2021-03-24 RX ADMIN — ONDANSETRON 4 MG: 2 INJECTION INTRAMUSCULAR; INTRAVENOUS at 18:49

## 2021-03-24 RX ADMIN — MORPHINE SULFATE 4 MG: 4 INJECTION, SOLUTION INTRAMUSCULAR; INTRAVENOUS at 03:40

## 2021-03-24 RX ADMIN — INSULIN LISPRO 2 UNITS: 100 INJECTION, SOLUTION INTRAVENOUS; SUBCUTANEOUS at 14:10

## 2021-03-24 RX ADMIN — ONDANSETRON 4 MG: 2 INJECTION INTRAMUSCULAR; INTRAVENOUS at 10:23

## 2021-03-24 RX ADMIN — SODIUM CHLORIDE, PRESERVATIVE FREE 10 ML: 5 INJECTION INTRAVENOUS at 10:44

## 2021-03-24 RX ADMIN — CEFTRIAXONE 1000 MG: 1 INJECTION, POWDER, FOR SOLUTION INTRAMUSCULAR; INTRAVENOUS at 21:28

## 2021-03-24 RX ADMIN — MORPHINE SULFATE 4 MG: 4 INJECTION, SOLUTION INTRAMUSCULAR; INTRAVENOUS at 00:23

## 2021-03-24 RX ADMIN — HYDRALAZINE HYDROCHLORIDE 50 MG: 50 TABLET ORAL at 21:28

## 2021-03-24 RX ADMIN — OXYCODONE HYDROCHLORIDE AND ACETAMINOPHEN 1 TABLET: 7.5; 325 TABLET ORAL at 21:30

## 2021-03-24 RX ADMIN — MORPHINE SULFATE 4 MG: 4 INJECTION, SOLUTION INTRAMUSCULAR; INTRAVENOUS at 10:23

## 2021-03-24 RX ADMIN — INSULIN GLARGINE 40 UNITS: 100 INJECTION, SOLUTION SUBCUTANEOUS at 21:29

## 2021-03-24 RX ADMIN — HYDRALAZINE HYDROCHLORIDE 50 MG: 50 TABLET ORAL at 10:37

## 2021-03-24 ASSESSMENT — PAIN - FUNCTIONAL ASSESSMENT
PAIN_FUNCTIONAL_ASSESSMENT: PREVENTS OR INTERFERES WITH MANY ACTIVE NOT PASSIVE ACTIVITIES
PAIN_FUNCTIONAL_ASSESSMENT: PREVENTS OR INTERFERES SOME ACTIVE ACTIVITIES AND ADLS

## 2021-03-24 ASSESSMENT — PAIN SCALES - GENERAL
PAINLEVEL_OUTOF10: 9
PAINLEVEL_OUTOF10: 9
PAINLEVEL_OUTOF10: 7
PAINLEVEL_OUTOF10: 9
PAINLEVEL_OUTOF10: 8
PAINLEVEL_OUTOF10: 5
PAINLEVEL_OUTOF10: 7

## 2021-03-24 ASSESSMENT — PAIN DESCRIPTION - FREQUENCY: FREQUENCY: CONTINUOUS

## 2021-03-24 ASSESSMENT — PAIN DESCRIPTION - LOCATION
LOCATION: LEG;FOOT

## 2021-03-24 ASSESSMENT — PAIN DESCRIPTION - PAIN TYPE
TYPE: ACUTE PAIN
TYPE: ACUTE PAIN

## 2021-03-24 ASSESSMENT — PAIN DESCRIPTION - DESCRIPTORS
DESCRIPTORS: BURNING;CONSTANT
DESCRIPTORS: BURNING;CONSTANT

## 2021-03-24 ASSESSMENT — PAIN DESCRIPTION - PROGRESSION
CLINICAL_PROGRESSION: NOT CHANGED
CLINICAL_PROGRESSION: GRADUALLY IMPROVING
CLINICAL_PROGRESSION: GRADUALLY WORSENING

## 2021-03-24 ASSESSMENT — PAIN DESCRIPTION - ONSET
ONSET: ON-GOING
ONSET: ON-GOING

## 2021-03-24 ASSESSMENT — PAIN DESCRIPTION - ORIENTATION
ORIENTATION: LEFT
ORIENTATION: LEFT

## 2021-03-24 NOTE — CONSULTS
[facility] [place] [address].   Consult Note      Reason for Consult: Cellulitis left leg with possibility of abscess left foot. Requesting Physician:      CHIEF COMPLAINT: Painful left leg and foot. HISTORY OF PRESENT ILLNESS:                The patient is a 39 y.o. male with significant past medical history of diabetes with neuropathy who presents with painful left leg with cellulitis which began 4 days ago. He relates he went to the ER Sunday evening and then when returned Monday by squad. He has been placed on antibiotics. Active Problems:    Cellulitis  Resolved Problems:    * No resolved hospital problems. *       Past Medical History:   Diagnosis Date    Abscess 2010    scrotal    Acute renal failure (ARF) ([address] 8/4/2019    Anxiety associated with depression     Anxiety associated with depression     Back pain 7/2/2012    Chest pain 5/1/2013    Diabetic nephropathy ([address]     Diabetic neuropathy ([address] 12/22/2011    Diabetic ulcer of left midfoot associated with type 2 diabetes mellitus, with fat layer exposed ([address] 7/18/2017    Diverticulosis     C scope + Dr. Vinnie Anderson DM (diabetes mellitus), type 2 ([address] 2002    DR. Turner podiatry    Hyperlipidemia LDL goal < 100 7/15/2013    Hypertension     Internal hemorrhoid     C scope + Dr. Roberto Tsai fracture 1988    Panic attacks     Pericarditis 2003    Hospitalized with s/p heart cath normal    Peripheral autonomic neuropathy due to diabetes mellitus ([address]     Axonal EMG- [facility], March 2011    Sebaceous cyst 9/1/2011    URI (upper respiratory infection) 2/27/2012    WD-Wound, surgical, infected, initial encounter 12/8/2017    Wrist fracture 1986         Past Surgical History:   Procedure Laterality Date    ABDOMEN SURGERY      CARDIAC CATHETERIZATION  2003, 2013    Normal (Dr. Gal Carl)    COLONOSCOPY  6/2/2011    Pandiverticulosis, Nonbleeding internal hemorrhoids, Repeat colonoscopy at age 48- Dr. Aparna Shields potassium chloride, magnesium sulfate    Allergies:  Adhesive tape, Doxycycline, and Reglan [metoclopramide]    Social History:    Social History     Socioeconomic History    Marital status:      Spouse name: Not on file    Number of children: Not on file    Years of education: Not on file    Highest education level: Not on file   Occupational History    Not on file   Social Needs    Financial resource strain: Not on file    Food insecurity     Worry: Not on file     Inability: Not on file    Transportation needs     Medical: Not on file     Non-medical: Not on file   Tobacco Use    Smoking status: Former Smoker     Years: 20.00     Quit date: 11/18/2017     Years since quitting: 3.3    Smokeless tobacco: Never Used   Substance and Sexual Activity    Alcohol use: Yes     Comment: occ    Drug use: Yes     Frequency: 7.0 times per week     Types: Marijuana    Sexual activity: Yes   Lifestyle    Physical activity     Days per week: Not on file     Minutes per session: Not on file    Stress: Not on file   Relationships    Social connections     Talks on phone: Not on file     Gets together: Not on file     Attends Che[employer] service: Not on file     Active member of club or organization: Not on file     Attends meetings of clubs or organizations: Not on file     Relationship status: Not on file    Intimate partner violence     Fear of current or ex partner: Not on file     Emotionally abused: Not on file     Physically abused: Not on file     Forced sexual activity: Not on file   Other Topics Concern    Not on file   Social History Narrative    Not on file     Family History:   Family History   Problem Relation Age of Onset    Cancer Mother         ?site    Stroke Mother     Bleeding Prob Mother     Diabetes Father     Heart Disease Father     High Blood Pressure Father     Obesity Father     Kidney Disease Father     Diabetes Sister     High Blood Pressure Sister     Mental Illness Sister     Obesity Sister     High Blood Pressure Other     Mental Illness Other         bipolar    Other Son         cyst in ear canal    ADHD Daughter        PHYSICAL EXAM:      SKIN: Blister in the sulcus under the second third and fourth toes of left foot. Large scaly hyperkeratotic lesion subsecond metatarsophalangeal joint on the same left foot with ulceration beneath this area after debridement today. No abscess found. This area was covered with a hyperkeratotic skin tissue with underlying ulceration. The ulceration measures approximately 3 cm x 2 cm with a depth of 1 cm. This is a full-thickness ulceration. No bone or tendon is exposed. NAILS: Dystrophic toenails on remaining toes. NEUROLOGIC: Diminished sharp dull sensations to both feet. VASCULAR: Palpable pedal pulses bilaterally involving the dorsalis pedis and posterior tibial pulses. MUSCULOSKELETAL: Previous amputation of the left fourth toe and digital amputations on the right foot as well. Patient has hyperkeratosis under the right and left first metatarsophalangeal joints and under the left second metatarsophalangeal joint with ulceration beneath this. Reviewed x-ray findings and x-ray report taken today. Vitals:    /74   Pulse 86   Temp 98.9 °F (37.2 °C) (Oral)   Resp 18   Ht 5' 9\" (1.753 m)   Wt 275 lb (124.7 kg)   SpO2 97%   BMI 40.61 kg/m²     IMPRESSION/RECOMMENDATIONS:      This patient has severe diabetic neuropathy and has developed blistering and a plantar ulceration under the second metatarsophalangeal joint that is full-thickness ulceration. My expectations were to find a large abscess in this area upon debridement at bedside today, however no pus whatsoever was found in this ulceration. There is no post tendon or bone. It is my feeling that the patient has developed cellulitis of the leg secondary to this wound on the foot. This should respond to current antibiotic therapy.   The therapy can change

## 2021-03-24 NOTE — CONSULTS
[place] [place] [address] Continence Nurse  Consult Note       Alayna Law  AGE: 39 y.o. GENDER: male  : 1975  TODAY'S DATE:  3/24/2021    Subjective:     Reason for  Evaluation and Assessment:  Wound care assessment      Alayna Law is a 39 y.o. male referred by:   [x] Physician  [] Nursing  [] Other:     Wound Identification:  Wound Type: diabetic and pressure  Contributing Factors: diabetes and chronic pressure        PAST MEDICAL HISTORY        Diagnosis Date    Abscess     scrotal    Acute renal failure (ARF) ([address] 2019    Anxiety associated with depression     Anxiety associated with depression     Back pain 2012    Chest pain 2013    Diabetic nephropathy ([address]     Diabetic neuropathy ([address] 2011    Diabetic ulcer of left midfoot associated with type 2 diabetes mellitus, with fat layer exposed ([address] 2017    Diverticulosis     C scope + Dr. Francisca Yoo DM (diabetes mellitus), type 2 ([address]     DR. Turner podiatry    Hyperlipidemia LDL goal < 100 7/15/2013    Hypertension     Internal hemorrhoid     C scope + Dr. Meadows Kenny fracture 1988    Panic attacks     Pericarditis     Hospitalized with s/p heart cath normal    Peripheral autonomic neuropathy due to diabetes mellitus ([address]     Axonal EMG- N[facility], 2011    Sebaceous cyst 2011    URI (upper respiratory infection) 2012    WD-Wound, surgical, infected, initial encounter 2017    Wrist fracture 1986       PAST SURGICAL HISTORY    Past Surgical History:   Procedure Laterality Date   [address] 2013    Normal (Dr. Ross Machado)    COLONOSCOPY  2011    Pandiverticulosis, Nonbleeding internal hemorrhoids, Repeat colonoscopy at age 48- Dr. Luis E Camacho    \"had reconstruction surgery on nose a year after the other nose surgery\"    OTHER SURGICAL HISTORY Right 2015    I & D right great toe with partial amputation    OTHER (Comment) Foot Left (Active)   Number of days: 1206       Wound 12/08/17 #3 abdoment (onset 3 days ago) SURGICAL (Active)   Number of days: 1202       Wound 12/08/17 #4 Lt plantar (onset 6 months ago) DIABETIC ALCALA 1 (Active)   Number of days: 1395       Wound 05/18/18 # 5 LEFT PLANTAR (ONSET 1 YEAR AGO) DM WAG 1 (Active)   Number of days: 9899       Wound 09/17/18 Left dorsal foot and 4th toe amp site 9/19/18 (Active)   Number of days: 918       Incision 05/22/15 Foot Right (Active)   Number of days: 2132       Incision 12/04/17 Abdomen Mid (Active)   Number of days: 1205       Incision 09/22/18 Foot Left (Active)   Number of days: 913       Wound 01/11/19 left plantar foot wound (Active)   Number of days: 803       Wound 03/21/19 Toe (Comment  which one) Anterior;Right (Active)   Number of days: 734       Wound 03/21/19 Foot Left;Posterior hard callus area (Active)   Number of days: 734       Wound 03/24/21 Left;Plantar (Active)   Wound Etiology Diabetic 03/24/21 1015   Dressing Status New dressing applied 03/24/21 1015   Wound Cleansed Cleansed with saline 03/24/21 1015   Dressing/Treatment Betadine swabs/povidone iodine 03/24/21 1015   Wound Length (cm) 6 cm 03/24/21 1015   Wound Width (cm) 3 cm 03/24/21 1015   Wound Depth (cm) 0.1 cm 03/24/21 1015   Wound Surface Area (cm^2) 18 cm^2 03/24/21 1015   Wound Volume (cm^3) 1.8 cm^3 03/24/21 1015   Distance Tunneling (cm) 0 cm 03/24/21 1015   Tunneling Position ___ O'Clock 0 03/24/21 1015   Undermining Starts ___ O'Clock 0 03/24/21 1015   Undermining Ends___ O'Clock 0 03/24/21 1015   Undermining Maxium Distance (cm) 0 03/24/21 1015   Drainage Amount Moderate 03/24/21 1015   Drainage Description Serosanguinous 03/24/21 1015   Odor None 03/24/21 1015   Margins Defined edges 03/24/21 1015   Wound Thickness Description not for Pressure Injury Full thickness 03/24/21 1015   Number of days: 0       Response to treatment:  With complaints of pain.      Pain Assessment:  Severity:   severe  Quality of pain: sore  Wound Pain Timing/Severity: left leg  Premedicated:  no    Plan:     Plan of Care: Wound 03/24/21 Left;Plantar-Dressing/Treatment: Betadine swabs/povidone iodine(abd kerlix tape )     Patient lying in bed agreeable to wound care eval. Pt has left plantar wound with large calloused area and blisters fluid/discoloration underneath. Pt has redness up the leg and a lot of pain. Pt follows with podiatry for feet. Dr Dorado Else consulted to assess. Washed foot with soap and water. Measured and pictured. Dressing as above. Rt foot intact with calloused areas to foot. Pt is generally not at risk for skin breakdown AEB nicole score. Specialty Bed Required : no  [] Low Air Loss   [] Pressure Redistribution  [] Fluid Immersion  [] Bariatric  [] Total Pressure Relief  [] Other:     Discharge Plan:  Placement for patient upon discharge: home  Hospice Care: no  Patient appropriate for Outpatient [address]:  Pt follows with podiatrist     Patient/Caregiver Teaching:  Level of patient/caregiver understanding able to: cares explained as given. Electronically signed by Mimi Salvador RN,  on 3/24/2021 at 12:55 PM

## 2021-03-25 LAB
CREAT SERPL-MCNC: 2.7 MG/DL (ref 0.9–1.3)
DOSE AMOUNT: NORMAL
DOSE TIME: NORMAL
GFR AFRICAN AMERICAN: 31 ML/MIN/1.73M2
GFR NON-AFRICAN AMERICAN: 26 ML/MIN/1.73M2
GLUCOSE BLD-MCNC: 185 MG/DL (ref 70–99)
GLUCOSE BLD-MCNC: 231 MG/DL (ref 70–99)
GLUCOSE BLD-MCNC: 244 MG/DL (ref 70–99)
GLUCOSE BLD-MCNC: 299 MG/DL (ref 70–99)
HCT VFR BLD CALC: 30.1 % (ref 42–52)
HEMOGLOBIN: 10.1 GM/DL (ref 13.5–18)
MCH RBC QN AUTO: 30.1 PG (ref 27–31)
MCHC RBC AUTO-ENTMCNC: 33.6 % (ref 32–36)
MCV RBC AUTO: 89.6 FL (ref 78–100)
PDW BLD-RTO: 12.3 % (ref 11.7–14.9)
PLATELET # BLD: 217 K/CU MM (ref 140–440)
PMV BLD AUTO: 10.4 FL (ref 7.5–11.1)
RBC # BLD: 3.36 M/CU MM (ref 4.6–6.2)
VANCOMYCIN RANDOM: 10.6 UG/ML
WBC # BLD: 7.2 K/CU MM (ref 4–10.5)

## 2021-03-25 PROCEDURE — 1200000000 HC SEMI PRIVATE

## 2021-03-25 PROCEDURE — 82565 ASSAY OF CREATININE: CPT

## 2021-03-25 PROCEDURE — 85027 COMPLETE CBC AUTOMATED: CPT

## 2021-03-25 PROCEDURE — 80202 ASSAY OF VANCOMYCIN: CPT

## 2021-03-25 PROCEDURE — 6370000000 HC RX 637 (ALT 250 FOR IP): Performed by: FAMILY MEDICINE

## 2021-03-25 PROCEDURE — 6360000002 HC RX W HCPCS: Performed by: FAMILY MEDICINE

## 2021-03-25 PROCEDURE — 2580000003 HC RX 258: Performed by: FAMILY MEDICINE

## 2021-03-25 PROCEDURE — 36415 COLL VENOUS BLD VENIPUNCTURE: CPT

## 2021-03-25 PROCEDURE — 82962 GLUCOSE BLOOD TEST: CPT

## 2021-03-25 PROCEDURE — 94761 N-INVAS EAR/PLS OXIMETRY MLT: CPT

## 2021-03-25 RX ORDER — INSULIN GLARGINE 100 [IU]/ML
45 INJECTION, SOLUTION SUBCUTANEOUS NIGHTLY
Status: DISCONTINUED | OUTPATIENT
Start: 2021-03-26 | End: 2021-03-26

## 2021-03-25 RX ORDER — INSULIN GLARGINE 100 [IU]/ML
5 INJECTION, SOLUTION SUBCUTANEOUS ONCE
Status: DISCONTINUED | OUTPATIENT
Start: 2021-03-26 | End: 2021-03-26 | Stop reason: SDUPTHER

## 2021-03-25 RX ORDER — HEPARIN SODIUM 5000 [USP'U]/ML
5000 INJECTION, SOLUTION INTRAVENOUS; SUBCUTANEOUS EVERY 8 HOURS
Status: DISCONTINUED | OUTPATIENT
Start: 2021-03-26 | End: 2021-03-29 | Stop reason: HOSPADM

## 2021-03-25 RX ORDER — SODIUM CHLORIDE 9 MG/ML
INJECTION, SOLUTION INTRAVENOUS CONTINUOUS
Status: DISCONTINUED | OUTPATIENT
Start: 2021-03-26 | End: 2021-03-26

## 2021-03-25 RX ADMIN — SODIUM CHLORIDE, PRESERVATIVE FREE 10 ML: 5 INJECTION INTRAVENOUS at 21:01

## 2021-03-25 RX ADMIN — LISINOPRIL 2.5 MG: 2.5 TABLET ORAL at 07:54

## 2021-03-25 RX ADMIN — MORPHINE SULFATE 4 MG: 4 INJECTION, SOLUTION INTRAMUSCULAR; INTRAVENOUS at 01:35

## 2021-03-25 RX ADMIN — HYDRALAZINE HYDROCHLORIDE 50 MG: 50 TABLET ORAL at 07:54

## 2021-03-25 RX ADMIN — INSULIN GLARGINE 40 UNITS: 100 INJECTION, SOLUTION SUBCUTANEOUS at 20:56

## 2021-03-25 RX ADMIN — HYDRALAZINE HYDROCHLORIDE 50 MG: 50 TABLET ORAL at 20:56

## 2021-03-25 RX ADMIN — INSULIN LISPRO 2 UNITS: 100 INJECTION, SOLUTION INTRAVENOUS; SUBCUTANEOUS at 13:26

## 2021-03-25 RX ADMIN — ASPIRIN 81 MG CHEWABLE TABLET 81 MG: 81 TABLET CHEWABLE at 07:54

## 2021-03-25 RX ADMIN — OXYCODONE HYDROCHLORIDE AND ACETAMINOPHEN 1 TABLET: 7.5; 325 TABLET ORAL at 12:42

## 2021-03-25 RX ADMIN — INSULIN LISPRO 2 UNITS: 100 INJECTION, SOLUTION INTRAVENOUS; SUBCUTANEOUS at 10:08

## 2021-03-25 RX ADMIN — OXYCODONE HYDROCHLORIDE AND ACETAMINOPHEN 1 TABLET: 7.5; 325 TABLET ORAL at 19:05

## 2021-03-25 RX ADMIN — MORPHINE SULFATE 4 MG: 4 INJECTION, SOLUTION INTRAMUSCULAR; INTRAVENOUS at 07:54

## 2021-03-25 RX ADMIN — CEFTRIAXONE 1000 MG: 1 INJECTION, POWDER, FOR SOLUTION INTRAMUSCULAR; INTRAVENOUS at 20:56

## 2021-03-25 RX ADMIN — INSULIN LISPRO 1 UNITS: 100 INJECTION, SOLUTION INTRAVENOUS; SUBCUTANEOUS at 18:00

## 2021-03-25 RX ADMIN — ENOXAPARIN SODIUM 40 MG: 40 INJECTION SUBCUTANEOUS at 07:54

## 2021-03-25 RX ADMIN — MORPHINE SULFATE 2 MG: 2 INJECTION, SOLUTION INTRAMUSCULAR; INTRAVENOUS at 20:56

## 2021-03-25 RX ADMIN — VANCOMYCIN HYDROCHLORIDE 1500 MG: 5 INJECTION, POWDER, LYOPHILIZED, FOR SOLUTION INTRAVENOUS at 12:41

## 2021-03-25 ASSESSMENT — PAIN DESCRIPTION - ORIENTATION
ORIENTATION: LEFT
ORIENTATION: LEFT

## 2021-03-25 ASSESSMENT — PAIN SCALES - GENERAL
PAINLEVEL_OUTOF10: 7
PAINLEVEL_OUTOF10: 8
PAINLEVEL_OUTOF10: 7
PAINLEVEL_OUTOF10: 0
PAINLEVEL_OUTOF10: 3
PAINLEVEL_OUTOF10: 7

## 2021-03-25 ASSESSMENT — PAIN DESCRIPTION - DESCRIPTORS
DESCRIPTORS: SHARP;STABBING
DESCRIPTORS: SHARP;STABBING

## 2021-03-25 ASSESSMENT — PAIN DESCRIPTION - PROGRESSION: CLINICAL_PROGRESSION: NOT CHANGED

## 2021-03-25 ASSESSMENT — PAIN DESCRIPTION - FREQUENCY: FREQUENCY: CONTINUOUS

## 2021-03-25 ASSESSMENT — PAIN DESCRIPTION - PAIN TYPE
TYPE: ACUTE PAIN
TYPE: ACUTE PAIN

## 2021-03-25 ASSESSMENT — PAIN DESCRIPTION - LOCATION
LOCATION: FOOT
LOCATION: FOOT

## 2021-03-25 NOTE — PROGRESS NOTES
Physician Progress Note      PATIENT:               Chencho Hernandez  CSN #:                  [id number]  :                       1975  ADMIT DATE:       3/23/2021 12:50 AM  DISCH DATE:  RESPONDING  PROVIDER #:        Thalia Gómez MD          QUERY TEXT:    Internal Medicine,    Pt admitted with Lt foot cellulitis and has left plantar DFU. If possible,   please document in progress notes and discharge summary the relationship, if   any, between cellulitis and DM. The medical record reflects the following:  Risk Factors: DM, DFU  Clinical Indicators: DFU, cellulitis; Per left foot Xray: Ulceration of the   plantar aspect of the forefoot with associated cellulitis. Treatment: labs, imaging, IV atb, wound consult    Thank you,  Omar Dumont RN CDS  [phone]  Options provided:  -- Left foot  cellulitis associated with Diabetes  -- Left foot cellulitis unrelated to Diabetes  -- Other - I will add my own diagnosis  -- Disagree - Not applicable / Not valid  -- Disagree - Clinically unable to determine / Unknown  -- Refer to Clinical Documentation Reviewer    PROVIDER RESPONSE TEXT:    Left foot cellulitis associated with Diabetes. Query created by: Dustin Solares on 3/24/2021 2:02 PM      QUERY TEXT:    Internal Medicine,    Pt admitted with cellulitis of lt foot and noted abnormal creatinine. If   possible, please document in the progress notes and discharge summary if you   are evaluating and/or treating any of the following:     The medical record reflects the following:  Risk Factors: DM, cellulitis  Clinical Indicators: creatinine 1.9-2.5  Treatment: serial labs, IVF    Thank you,  Omar Dumont RN CDS  [phone]  Options provided:  -- Acute kidney failure  -- Acute kidney failure with acute tubular necrosis  -- Acute kidney injury  -- Other - I will add my own diagnosis  -- Disagree - Not applicable / Not valid  -- Disagree - Clinically unable to determine / Unknown  -- Refer to Clinical Documentation Reviewer    PROVIDER RESPONSE TEXT:    This patient is in Acute kidney failure.     Query created by: Nallely Rasmussen on 3/24/2021 2:09 PM      Electronically signed by:  Keshawn Biggs MD 3/24/2021 11:33 PM

## 2021-03-25 NOTE — PROGRESS NOTES
Attending Progress Note      PCP: Marisa García MD      Patient: Katelynn Mendez   Gender: male  : 1975   Age: 39 y.o. MRN: [id number]  Room  :  [address] [place] [address]-      Date of Admission: 3/23/2021    Chief Complaint   Patient presents with    Leg Pain     L sided            Subjective: pain controlled       Medications:  Reviewed  Infusion Medications    dextrose       Scheduled Medications    [START ON 3/25/2021] vancomycin (VANCOCIN) intermittent dosing (placeholder)   Other RX Placeholder    sodium chloride flush  10 mL Intravenous 2 times per day    aspirin  81 mg Oral Daily    lisinopril  2.5 mg Oral Daily    insulin glargine  40 Units Subcutaneous Nightly    insulin lispro  12 Units Subcutaneous TID WC    hydrALAZINE  50 mg Oral TID    enoxaparin  40 mg Subcutaneous Daily    nicotine  1 patch Transdermal Daily    insulin lispro  0-6 Units Subcutaneous TID WC    insulin lispro  0-3 Units Subcutaneous Nightly    cefTRIAXone (ROCEPHIN) IV  1,000 mg Intravenous Q24H     PRN Meds: sodium chloride flush, acetaminophen, morphine **OR** morphine, oxyCODONE-acetaminophen, tiZANidine, glucose, dextrose, glucagon (rDNA), dextrose, promethazine **OR** ondansetron, potassium chloride **OR** potassium alternative oral replacement **OR** potassium chloride, magnesium sulfate    No intake or output data in the 24 hours ending 21 2311    Exam:  /74   Pulse 81   Temp 98.2 °F (36.8 °C) (Oral)   Resp 18   Ht 5' 9\" (1.753 m)   Wt 275 lb (124.7 kg)   SpO2 99%   BMI 40.61 kg/m²   General appearance: No distress,   Respiratory:  good air entry , no Rales , No wheezing, or rhonchi,  Cardiovascular: RRR, with normal S1/S2 . Abdomen : Soft, non-tender, non-distended  , normal bowel sounds.   Legs :left leg redness, planter wound/bleedin, chronic with callus     Neurologic:  Alert and oriented ,        Labs:   Recent Labs     21  0153 21  0413   WBC 11.4* 10.8*   HGB 11.2* 10.8* HCT 32.8* 31.3*    201     Recent Labs     03/23/21  0153 03/24/21  0413   * 133*   K 4.3 3.8   CL 93* 96*   CO2 24 24   BUN 30* 41*   CREATININE 1.9* 2.5*   CALCIUM 8.0* 7.5*     Recent Labs     03/23/21  0153   AST 20   ALT 15   BILITOT 0.8   ALKPHOS 76     No results for input(s): INR in the last 72 hours. No results for input(s): [place] in the last 72 hours. Assessment/Plan:  Active Hospital Problems    Diagnosis Date Noted    Cellulitis [L03.90] 03/23/2021   diabetic foot  DM II    Obesity               Assessment/Plan:   Tele   IV ABX . .. c/s pending   Wound care input noted . .. podiatrist consult requested   Foot xr : no sign of abscess or osteomyelitis;   Glucose control   IVF  Pain control  Glucose control   Home meds , reviewed and resumed as appropriate   Symptoms releif/Pain control  DVT proph     Plan:  Tele : no event - no fever - on room air   DVT Prophylaxis   Diet: DIET CARB CONTROL; Carb Control: 4 carb choices (60 gms)/meal  Dietary Nutrition Supplements: Low Calorie High Protein Supplement  Code Status: Full Code        Treatment progress and plan was d/w pt/family .     Constantino Ortega MD

## 2021-03-25 NOTE — PROGRESS NOTES
[facility]  consulted by Dr. Tawanda Fu for monitoring and adjustment. Indication for treatment: Cellulitis of the lower extremity  Goal trough: 10-15 mcg/mL    Pertinent Laboratory Values:   Temp Readings from Last 3 Encounters:   03/25/21 97.6 °F (36.4 °C) (Oral)   03/22/21 98.4 °F (36.9 °C) (Oral)   11/25/19 98.2 °F (36.8 °C) (Oral)     Recent Labs     03/23/21  0153 03/24/21 0413 03/25/21 0623   WBC 11.4* 10.8* 7.2   LACTATE 1.2  --   --      Recent Labs     03/23/21  0153 03/24/21  0413 03/25/21  0623   BUN 30* 41*  --    CREATININE 1.9* 2.5* 2.7*     Estimated Creatinine Clearance: 45 mL/min (A) (based on SCr of 2.7 mg/dL (H)). Intake/Output Summary (Last 24 hours) at 3/25/2021 1332  Last data filed at 3/25/2021 0138  Gross per 24 hour   Intake --   Output 600 ml   Net -600 ml     Pertinent Cultures:  Date    Source    Results  none                 Vancomycin level:   TROUGH:  No results for input(s): VANCOTROUGH in the last 72 hours. RANDOM:    Recent Labs     03/25/21 0623   VANCORANDOM 10.6       Assessment:  · WBC and temperature: WBC trending down to normal today, pt remains afebrile  · SCr, BUN, and urine output: CARMEN, SCR continues to trend upward to 2.7 today, some documented UOP  · Day(s) of therapy:  3  · Vancomycin concentration:  3/25 - 10.6 (34 hours post 1500mg dose)    Plan:  · Vanco level ok @10.6 this am, ok to re-dose. · Due to CARMEN, intermittent vanco dosing based on levels and renal trends remains appropriate  · Give vancomycin 1,500mg ivpb x1 dose today and recheck the vanco level tomorrow am  · Pharmacy will continue to monitor patient and adjust therapy as indicated    VANCOMYCIN CONCENTRATION SCHEDULED FOR 3/26 @10:00    Thank you for the consult. Burnell Hashimoto Tia August   3/25/2021 1:32 PM

## 2021-03-26 ENCOUNTER — APPOINTMENT (OUTPATIENT)
Dept: ULTRASOUND IMAGING | Age: 46
DRG: 638 | End: 2021-03-26
Payer: MEDICARE

## 2021-03-26 LAB
AMORPHOUS: ABNORMAL /HPF
BACTERIA: NEGATIVE /HPF
BILIRUBIN URINE: NEGATIVE MG/DL
BLOOD, URINE: NEGATIVE
CLARITY: CLEAR
COLOR: YELLOW
CREAT SERPL-MCNC: 2.2 MG/DL (ref 0.9–1.3)
CREATININE URINE: 1.1 MG/DL (ref 39–259)
DOSE AMOUNT: NORMAL
DOSE TIME: NORMAL
GFR AFRICAN AMERICAN: 39 ML/MIN/1.73M2
GFR NON-AFRICAN AMERICAN: 33 ML/MIN/1.73M2
GLUCOSE BLD-MCNC: 129 MG/DL (ref 70–99)
GLUCOSE BLD-MCNC: 159 MG/DL (ref 70–99)
GLUCOSE BLD-MCNC: 236 MG/DL (ref 70–99)
GLUCOSE BLD-MCNC: 257 MG/DL (ref 70–99)
GLUCOSE, URINE: 50 MG/DL
HCT VFR BLD CALC: 30.2 % (ref 42–52)
HEMOGLOBIN: 10.1 GM/DL (ref 13.5–18)
KETONES, URINE: NEGATIVE MG/DL
LEUKOCYTE ESTERASE, URINE: NEGATIVE
MCH RBC QN AUTO: 30.7 PG (ref 27–31)
MCHC RBC AUTO-ENTMCNC: 33.4 % (ref 32–36)
MCV RBC AUTO: 91.8 FL (ref 78–100)
MUCUS: ABNORMAL HPF
NITRITE URINE, QUANTITATIVE: NEGATIVE
PDW BLD-RTO: 12.2 % (ref 11.7–14.9)
PH, URINE: 5 (ref 5–8)
PLATELET # BLD: 224 K/CU MM (ref 140–440)
PMV BLD AUTO: 9.9 FL (ref 7.5–11.1)
PROT/CREAT RATIO, UR: 90.9
PROTEIN UA: >500 MG/DL
RBC # BLD: 3.29 M/CU MM (ref 4.6–6.2)
RBC URINE: ABNORMAL /HPF (ref 0–3)
SODIUM URINE: 34 MMOL/L (ref 35–167)
SPECIFIC GRAVITY UA: 1.01 (ref 1–1.03)
TRICHOMONAS: ABNORMAL /HPF
URINE TOTAL PROTEIN: 100 MG/DL
UROBILINOGEN, URINE: NEGATIVE MG/DL (ref 0.2–1)
VANCOMYCIN TROUGH: 13.6 UG/ML (ref 10–20)
WBC # BLD: 7.6 K/CU MM (ref 4–10.5)
WBC UA: ABNORMAL /HPF (ref 0–2)

## 2021-03-26 PROCEDURE — 6360000002 HC RX W HCPCS: Performed by: FAMILY MEDICINE

## 2021-03-26 PROCEDURE — 1200000000 HC SEMI PRIVATE

## 2021-03-26 PROCEDURE — 80202 ASSAY OF VANCOMYCIN: CPT

## 2021-03-26 PROCEDURE — 6370000000 HC RX 637 (ALT 250 FOR IP): Performed by: FAMILY MEDICINE

## 2021-03-26 PROCEDURE — 2580000003 HC RX 258: Performed by: FAMILY MEDICINE

## 2021-03-26 PROCEDURE — 82570 ASSAY OF URINE CREATININE: CPT

## 2021-03-26 PROCEDURE — 84300 ASSAY OF URINE SODIUM: CPT

## 2021-03-26 PROCEDURE — 84156 ASSAY OF PROTEIN URINE: CPT

## 2021-03-26 PROCEDURE — 94761 N-INVAS EAR/PLS OXIMETRY MLT: CPT

## 2021-03-26 PROCEDURE — 81001 URINALYSIS AUTO W/SCOPE: CPT

## 2021-03-26 PROCEDURE — 82565 ASSAY OF CREATININE: CPT

## 2021-03-26 PROCEDURE — 36415 COLL VENOUS BLD VENIPUNCTURE: CPT

## 2021-03-26 PROCEDURE — 85027 COMPLETE CBC AUTOMATED: CPT

## 2021-03-26 PROCEDURE — 82962 GLUCOSE BLOOD TEST: CPT

## 2021-03-26 PROCEDURE — 76775 US EXAM ABDO BACK WALL LIM: CPT

## 2021-03-26 PROCEDURE — 99211 OFF/OP EST MAY X REQ PHY/QHP: CPT

## 2021-03-26 PROCEDURE — 80048 BASIC METABOLIC PNL TOTAL CA: CPT

## 2021-03-26 RX ORDER — SODIUM CHLORIDE 9 MG/ML
1000 INJECTION, SOLUTION INTRAVENOUS ONCE
Status: DISCONTINUED | OUTPATIENT
Start: 2021-03-26 | End: 2021-03-29 | Stop reason: HOSPADM

## 2021-03-26 RX ORDER — LACTULOSE 10 G/15ML
20 SOLUTION ORAL 2 TIMES DAILY
Status: DISCONTINUED | OUTPATIENT
Start: 2021-03-26 | End: 2021-03-29 | Stop reason: HOSPADM

## 2021-03-26 RX ORDER — INSULIN GLARGINE 100 [IU]/ML
15 INJECTION, SOLUTION SUBCUTANEOUS ONCE
Status: COMPLETED | OUTPATIENT
Start: 2021-03-26 | End: 2021-03-26

## 2021-03-26 RX ORDER — INSULIN GLARGINE 100 [IU]/ML
50 INJECTION, SOLUTION SUBCUTANEOUS NIGHTLY
Status: DISCONTINUED | OUTPATIENT
Start: 2021-03-26 | End: 2021-03-29 | Stop reason: HOSPADM

## 2021-03-26 RX ADMIN — INSULIN LISPRO 3 UNITS: 100 INJECTION, SOLUTION INTRAVENOUS; SUBCUTANEOUS at 09:14

## 2021-03-26 RX ADMIN — MORPHINE SULFATE 4 MG: 4 INJECTION, SOLUTION INTRAMUSCULAR; INTRAVENOUS at 12:56

## 2021-03-26 RX ADMIN — LACTULOSE 20 G: 10 SOLUTION ORAL at 15:23

## 2021-03-26 RX ADMIN — HYDRALAZINE HYDROCHLORIDE 50 MG: 50 TABLET ORAL at 14:58

## 2021-03-26 RX ADMIN — HEPARIN SODIUM 5000 UNITS: 5000 INJECTION INTRAVENOUS; SUBCUTANEOUS at 22:51

## 2021-03-26 RX ADMIN — INSULIN GLARGINE 15 UNITS: 100 INJECTION, SOLUTION SUBCUTANEOUS at 22:51

## 2021-03-26 RX ADMIN — SODIUM CHLORIDE: 9 INJECTION, SOLUTION INTRAVENOUS at 11:47

## 2021-03-26 RX ADMIN — SODIUM CHLORIDE, PRESERVATIVE FREE 10 ML: 5 INJECTION INTRAVENOUS at 20:06

## 2021-03-26 RX ADMIN — INSULIN LISPRO 2 UNITS: 100 INJECTION, SOLUTION INTRAVENOUS; SUBCUTANEOUS at 12:56

## 2021-03-26 RX ADMIN — SODIUM CHLORIDE, PRESERVATIVE FREE 10 ML: 5 INJECTION INTRAVENOUS at 09:17

## 2021-03-26 RX ADMIN — HEPARIN SODIUM 5000 UNITS: 5000 INJECTION INTRAVENOUS; SUBCUTANEOUS at 09:14

## 2021-03-26 RX ADMIN — HYDRALAZINE HYDROCHLORIDE 50 MG: 50 TABLET ORAL at 09:16

## 2021-03-26 RX ADMIN — MORPHINE SULFATE 4 MG: 4 INJECTION, SOLUTION INTRAMUSCULAR; INTRAVENOUS at 20:09

## 2021-03-26 RX ADMIN — MORPHINE SULFATE 4 MG: 4 INJECTION, SOLUTION INTRAMUSCULAR; INTRAVENOUS at 02:40

## 2021-03-26 RX ADMIN — OXYCODONE HYDROCHLORIDE AND ACETAMINOPHEN 1 TABLET: 7.5; 325 TABLET ORAL at 15:23

## 2021-03-26 RX ADMIN — VANCOMYCIN HYDROCHLORIDE 1500 MG: 5 INJECTION, POWDER, LYOPHILIZED, FOR SOLUTION INTRAVENOUS at 12:56

## 2021-03-26 RX ADMIN — ASPIRIN 81 MG CHEWABLE TABLET 81 MG: 81 TABLET CHEWABLE at 09:16

## 2021-03-26 RX ADMIN — HEPARIN SODIUM 5000 UNITS: 5000 INJECTION INTRAVENOUS; SUBCUTANEOUS at 14:58

## 2021-03-26 RX ADMIN — OXYCODONE HYDROCHLORIDE AND ACETAMINOPHEN 1 TABLET: 7.5; 325 TABLET ORAL at 09:18

## 2021-03-26 RX ADMIN — INSULIN LISPRO 1 UNITS: 100 INJECTION, SOLUTION INTRAVENOUS; SUBCUTANEOUS at 18:15

## 2021-03-26 RX ADMIN — HYDRALAZINE HYDROCHLORIDE 50 MG: 50 TABLET ORAL at 20:05

## 2021-03-26 RX ADMIN — SODIUM CHLORIDE: 9 INJECTION, SOLUTION INTRAVENOUS at 02:40

## 2021-03-26 ASSESSMENT — PAIN DESCRIPTION - ORIENTATION: ORIENTATION: LEFT

## 2021-03-26 ASSESSMENT — PAIN SCALES - GENERAL
PAINLEVEL_OUTOF10: 5
PAINLEVEL_OUTOF10: 7
PAINLEVEL_OUTOF10: 3
PAINLEVEL_OUTOF10: 7

## 2021-03-26 ASSESSMENT — PAIN DESCRIPTION - LOCATION
LOCATION: FOOT
LOCATION: FOOT

## 2021-03-26 NOTE — PROGRESS NOTES
non-distended  , normal bowel sounds. Legs :left leg redness, planter wound/bleedin, chronic with callus     Neurologic:  Alert and oriented ,        Labs:   Recent Labs     03/24/21  0413 03/25/21  0623 03/26/21  0955   WBC 10.8* 7.2 7.6   HGB 10.8* 10.1* 10.1*   HCT 31.3* 30.1* 30.2*    217 224     Recent Labs     03/24/21  0413 03/25/21  0623   *  --    K 3.8  --    CL 96*  --    CO2 24  --    BUN 41*  --    CREATININE 2.5* 2.7*   CALCIUM 7.5*  --      No results for input(s): AST, ALT, BILIDIR, BILITOT, ALKPHOS in the last 72 hours. No results for input(s): INR in the last 72 hours. No results for input(s): [place] Charlton in the last 72 hours. Assessment/Plan:  Active Hospital Problems    Diagnosis Date Noted    Cellulitis [L03.90] 03/23/2021   diabetic foot  DM II  , uncontrolled   Obesity   ARF               Assessment/Plan:   Tele   IV ABX . .. c/s and  sensitivity noted. .. d/c rocephin . . cont Vanco     Wound care input noted . .. podiatrist saw pt yesterday . . plan for outpt f/u . Shahid Yusuf bed side debridement done . ARF . Shahid Yusuf IVF , d/c lisinopril , renal U/S : no concern , Nephro input noted    Cont  heparin   Glucose control. . furhter Increase lantus and insulin for better glucose control   Will consider PICC line to cont IV ABX on discharge , if needed   Foot xr : no sign of abscess or osteomyelitis;   Home meds , reviewed and resumed as appropriate   Symptoms releif/Pain control  DVT proph    no fever - on room air   DVT Prophylaxis   Diet: DIET CARB CONTROL; Carb Control: 4 carb choices (60 gms)/meal  Dietary Nutrition Supplements: Low Calorie High Protein Supplement  Code Status: Full Code        Treatment progress and plan was d/w pt/family .     Kelly Dumas MD

## 2021-03-26 NOTE — CONSULTS
inc and drainage of abcess    FL DEEP INCIS FOOT BONE INFECTN Left 9/22/2018    LEFT FOOT DEBRIDEMENT INCISION AND DRAINAGE TOP AND BOTTOM performed by Linette Tadeo DPM at [address] [place] [address] [phone]    sebaceous cyst removal times 4     TOE AMPUTATION      right great toe    TOE AMPUTATION Right 3/23/2019    TOE AMPUTATION RIGHT 5TH TOE performed by Linette Tadeo DPM at [facility] TOE AMPUTATION Right 8/6/2019    TOE AMPUTATION RIGHT 4TH TOE performed by Linette Tadeo DPM at [address] [place] [address] Se UPPER GASTROINTESTINAL ENDOSCOPY  06/2/2011    Mild gastritis with moderatley severe antritis, small hiatal hernia, Dr. Lia Licea N/A 1/12/2019    EGD BIOPSY performed by Henry Griffith MD at [place] [address] History   Problem Relation Age of Onset   Ja Reaper Cancer Mother         ?site   Ja Reaper Stroke Mother     Bleeding Prob Mother     Diabetes Father     Heart Disease Father     High Blood Pressure Father     Obesity Father     Kidney Disease Father     Diabetes Sister     High Blood Pressure Sister     Mental Illness Sister     Obesity Sister     High Blood Pressure Other     Mental Illness Other         bipolar    Other Son         cyst in ear canal    ADHD Daughter        SOCIAL HISTORY    Social History     Tobacco Use    Smoking status: Former Smoker     Years: 20.00     Quit date: 11/18/2017     Years since quitting: 3.3    Smokeless tobacco: Never Used   Substance Use Topics    Alcohol use: Yes     Comment: occ    Drug use: Yes     Frequency: 7.0 times per week     Types: Marijuana       ALLERGIES    Allergies   Allergen Reactions    Adhesive Tape Rash    Doxycycline Nausea And Vomiting    Reglan [Metoclopramide] Anxiety       MEDICATIONS    No current facility-administered medications on file prior to encounter.       Current Outpatient Medications on File Prior to Encounter Medication Sig Dispense Refill    ibuprofen (ADVIL;MOTRIN) 600 MG tablet Take 1 tablet by mouth every 6 hours as needed for Pain 30 tablet 0    insulin glargine (LANTUS) 100 UNIT/ML injection vial Inject 40 Units into the skin nightly 1 vial 0    insulin lispro (HUMALOG) 100 UNIT/ML injection vial Inject 25 Units into the skin 3 times daily (with meals) 1 vial 0    linagliptin (TRADJENTA) 5 MG tablet Take 1 tablet by mouth daily 30 tablet 0    metFORMIN (GLUCOPHAGE) 1000 MG tablet Take 1 tablet by mouth 2 times daily (with meals) 60 tablet 0    hydrALAZINE (APRESOLINE) 50 MG tablet Take 1 tablet by mouth 3 times daily 90 tablet 0    tiZANidine (ZANAFLEX) 2 MG tablet Take 2 mg by mouth every 8 hours as needed (muscle spasms)      lidocaine (LIDODERM) 5 % Place 1 patch onto the skin daily 12 hours on, 12 hours off.  lisinopril (PRINIVIL;ZESTRIL) 2.5 MG tablet Take 2.5 mg by mouth daily      oxyCODONE-acetaminophen (PERCOCET) 7.5-325 MG per tablet Take 1 tablet by mouth every 6 hours as needed for Pain. .      ondansetron (ZOFRAN) 4 MG tablet Take 1 tablet by mouth every 8 hours as needed for Nausea or Vomiting 20 tablet 0    aspirin 81 MG chewable tablet Take 1 tablet by mouth daily 30 tablet 3    Lancets MISC 1 each by Does not apply route daily 100 each 3    Insulin Pen Needle 32G X 5 MM MISC 1 each by Does not apply route daily 100 each 3    glucose monitoring kit (FREESTYLE) monitoring kit 1 each by Does not apply route once for 1 dose.  1 kit 0         Objective:      /76   Pulse 80   Temp 98.7 °F (37.1 °C) (Oral)   Resp 18   Ht 5' 9\" (1.753 m)   Wt 273 lb 9.6 oz (124.1 kg)   SpO2 93%   BMI 40.40 kg/m²   Pradeep Risk Score: Pradeep Scale Score: 21    LABS    CBC:   Lab Results   Component Value Date    WBC 7.6 03/26/2021    RBC 3.29 03/26/2021    HGB 10.1 03/26/2021    HCT 30.2 03/26/2021    MCV 91.8 03/26/2021    MCH 30.7 03/26/2021    MCHC 33.4 03/26/2021    RDW 12.2 03/26/2021  03/26/2021    MPV 9.9 03/26/2021     CMP:    Lab Results   Component Value Date     03/24/2021    K 3.8 03/24/2021    CL 96 03/24/2021    CO2 24 03/24/2021    BUN 41 03/24/2021    CREATININE 2.7 03/25/2021    GFRAA 31 03/25/2021    LABGLOM 26 03/25/2021    GLUCOSE 279 03/24/2021    PROT 6.1 03/23/2021    PROT 7.1 04/11/2012    LABALBU 2.2 03/23/2021    CALCIUM 7.5 03/24/2021    BILITOT 0.8 03/23/2021    ALKPHOS 76 03/23/2021    AST 20 03/23/2021    ALT 15 03/23/2021     Albumin:    Lab Results   Component Value Date    LABALBU 2.2 03/23/2021     PT/INR:    Lab Results   Component Value Date    PROTIME 11.8 03/22/2019    PROTIME 12.5 12/13/2010    INR 1.02 03/22/2019     HgBA1c:    Lab Results   Component Value Date    LABA1C 10.5 03/23/2021         Assessment:     Patient Active Problem List   Diagnosis    Hiatal hernia    Diabetes mellitus type 2, improved controlled    Diabetic neuropathy ([address]    Panic attack    Anxiety associated with depression    Neck pain    DANIELA (obstructive sleep apnea)    Urinary frequency    Bilateral carpal tunnel syndrome- left worse than right    Hyperlipidemia with target LDL less than 100    HTN, goal below 140/90    Bronchitis    Osteomyelitis ([address]    Diabetic ulcer of left midfoot ([address]    WD-Diabetic ulcer of left midfoot associated with type 2 diabetes mellitus, with fat layer exposed ([address]    Abscess    Periumbilical hernia    WD-Wound, surgical, infected, initial encounter    Sepsis ([address]    Cellulitis of left foot    Constipation    Intractable nausea and vomiting    Uncontrolled type 2 diabetes mellitus (HCC)    Nausea and vomiting    Diabetic foot infection ([address]    Acute renal failure (ARF) ([address]    Osteomyelitis of fourth toe of right foot ([address]    Cellulitis       Measurements:  Wound 07/18/17 Other (Comment) WOUND #2 LEFT PLANTAR (ONSET 3 MTHS) (Active)   Number of days: 1347       Wound 12/03/17 Other (Comment) Foot Left (Active)   Number of days: 7506       Wound 12/08/17 #3 abdoment (onset 3 days ago) SURGICAL (Active)   Number of days: 2616       Wound 12/08/17 #4 Lt plantar (onset 6 months ago) DIABETIC ALCALA 1 (Active)   Number of days: 2338       Wound 05/18/18 # 5 LEFT PLANTAR (ONSET 1 YEAR AGO) DM WAG 1 (Active)   Number of days: 9981       Wound 09/17/18 Left dorsal foot and 4th toe amp site 9/19/18 (Active)   Number of days: 920       Incision 05/22/15 Foot Right (Active)   Number of days: 2134       Incision 12/04/17 Abdomen Mid (Active)   Number of days: 2230       Incision 09/22/18 Foot Left (Active)   Number of days: 916       Wound 01/11/19 left plantar foot wound (Active)   Number of days: 805       Wound 03/21/19 Toe (Comment  which one) Anterior;Right (Active)   Number of days: 736       Wound 03/21/19 Foot Left;Posterior hard callus area (Active)   Number of days: 736       Wound 03/24/21 Left;Plantar (Active)   Wound Etiology Diabetic 03/26/21 1325   Dressing Status New dressing applied 03/26/21 1325   Wound Cleansed Cleansed with saline 03/26/21 1325   Dressing/Treatment ABD 03/24/21 1851   Wound Length (cm) 2 cm 03/26/21 1325   Wound Width (cm) 2.5 cm 03/26/21 1325   Wound Depth (cm) 0.2 cm 03/26/21 1325   Wound Surface Area (cm^2) 5 cm^2 03/26/21 1325   Change in Wound Size % (l*w) 72.22 03/26/21 1325   Wound Volume (cm^3) 1 cm^3 03/26/21 1325   Wound Healing % 44 03/26/21 1325   Distance Tunneling (cm) 0 cm 03/26/21 1325   Tunneling Position ___ O'Clock 0 03/26/21 1325   Undermining Starts ___ O'Clock 0 03/26/21 1325   Undermining Ends___ O'Clock 0 03/26/21 1325   Undermining Maxium Distance (cm) 0 03/26/21 1325   Wound Assessment Pink/red 03/26/21 1325   Drainage Amount Small 03/26/21 1325   Drainage Description Serosanguinous 03/26/21 1325   Odor None 03/26/21 1325   Shazia-wound Assessment Hyperkeratosis (callous) 03/26/21 1325   Margins Defined edges 03/26/21 1325   Wound Thickness Description not for Pressure Injury Full thickness 03/26/21 1325   Number of days: 2       Response to treatment:  Well tolerated by patient. Pain Assessment:  Severity:  none  Quality of pain: na  Wound Pain Timing/Severity: na  Premedicated: no    Plan:     Plan of Care: Wound 03/24/21 Left;Plantar-Dressing/Treatment: (collagen, abd, kerlix)      Pt in bed. Agreeable to wound reassessment since debrided per Dr. Fabricio Chery. Wound to left plantar pictured and measured. Shazia wound calloused. Recommend collagen, abd, kerlix. Applied. Tolerated well. Pt is generally not at risk for skin breakdown AEB Pradeep. Specialty Bed Required : no  [] Low Air Loss   [] Pressure Redistribution  [] Fluid Immersion  [] Bariatric  [] Total Pressure Relief  [] Other:     Discharge Plan:  Placement for patient upon discharge: tbd  Hospice Care: no  Patient appropriate for Outpatient [address] [facility]: follow up with Dr. Fabricio Chery    Patient/Caregiver Teaching:  Level of patient/caregiver understanding able to:   Voiced understanding regarding wound care.         Electronically signed by Diego Vallecillo RN,  on 3/26/2021 at 2:57 PM

## 2021-03-26 NOTE — PROGRESS NOTES
Comprehensive Nutrition Assessment    Type and Reason for Visit:  Reassess    Nutrition Recommendations/Plan:   Continue current diet and low marcial high pro ons as ordered   Encourage consistent carbs per meal   Monitor BS, weights, po intake and nutrition status     Nutrition Assessment:  Pt on Carb Control (4) diet with Low Marcial High Pro ons. Spoke with pt sitting up in bed. Pt feeling better today, consumed 100% of meal tray with good record of fair po intake in I/O. Pt states good appetite and oral intake. Denied nausea, vomiting, or diarrhea. + Constipation noted. Enjoys the ons. Will follow pt as low nutrition risk during stay. Estimated Daily Nutrient Needs:  Energy (kcal):  4374-3814 (1-1.2 stress factor); Weight Used for Energy Requirements:  Current     Protein (g):   (1.2-1.4 g/kg IBW); Weight Used for Protein Requirements:  Ideal        Fluid (ml/day):  1574-3479; Method Used for Fluid Requirements:  1 ml/kcal      Nutrition Related Findings:  POC Glu 236, BS trending down.   Rx: lantus, humalog    Wounds:  None       Current Nutrition Therapies:    DIET CARB CONTROL; Carb Control: 4 carb choices (60 gms)/meal  Dietary Nutrition Supplements: Low Calorie High Protein Supplement    Anthropometric Measures:  · Height: 5' 9\" (175.3 cm)  · Current Body Weight: 274 lb 8 oz (124.5 kg)   · Admission Body Weight: 274 lb (124.3 kg)    · Usual Body Weight: 270 lb (122.5 kg)     · Ideal Body Weight: 160 lbs; % Ideal Body Weight 171.6 %   · BMI: 40.5  · Adjusted Body Weight:  ; No Adjustment   · Adjusted BMI:      · BMI Categories: Obese Class 3 (BMI 40.0 or greater)       Nutrition Diagnosis:   No nutrition diagnosis at this time    Nutrition Interventions:   Food and/or Nutrient Delivery:  Continue Current Diet, Continue Oral Nutrition Supplement  Nutrition Education/Counseling:  No recommendation at this time   Coordination of Nutrition Care:  Continue to monitor while inpatient, Coordination of Community Care    Goals:  Pt will consume at least 75% of meals and supplements.        Nutrition Monitoring and Evaluation:   Behavioral-Environmental Outcomes:  None Identified   Food/Nutrient Intake Outcomes:  Food and Nutrient Intake, Supplement Intake  Physical Signs/Symptoms Outcomes:  Biochemical Data, Constipation, Other (Comment)     Discharge Planning:    Continue current diet, Continue Oral Nutrition Supplement(ONS as needed)     Electronically signed by Janice Leal RD, LD on 3/26/21 at 2:55 PM EDT    Contact: [phone]

## 2021-03-26 NOTE — PLAN OF CARE
Problem: Falls - Risk of:  Goal: Will remain free from falls  Description: Will remain free from falls  3/26/2021 1147 by Paul Hoffman RN  Outcome: Ongoing  8/53/6194 [id number] by Mal Partida RN  Outcome: Ongoing  Goal: Absence of physical injury  Description: Absence of physical injury  3/26/2021 1147 by Paul Hoffman RN  Outcome: Ongoing  2/54/5336 [id number] by Mal Partida RN  Outcome: Ongoing     Problem: Pain:  Goal: Pain level will decrease  Description: Pain level will decrease  3/26/2021 1147 by Paul Hoffman RN  Outcome: Ongoing  6/22/0075 4881 by Mal Partida RN  Outcome: Ongoing  Goal: Control of acute pain  Description: Control of acute pain  3/26/2021 1147 by Paul Hoffman RN  Outcome: Ongoing  1/98/7119 5864 by Mal Partida RN  Outcome: Ongoing  Goal: Control of chronic pain  Description: Control of chronic pain  3/26/2021 1147 by Paul Hoffman RN  Outcome: Ongoing  9/53/7023 [id number] by Mal Partida RN  Outcome: Ongoing

## 2021-03-26 NOTE — PROGRESS NOTES
[facility]  consulted by Dr. Coretta Thomas for monitoring and adjustment. Indication for treatment: Cellulitis of the lower extremity  Goal trough: 10-15 mcg/mL    Pertinent Laboratory Values:   Temp Readings from Last 3 Encounters:   03/26/21 97.6 °F (36.4 °C) (Oral)   03/22/21 98.4 °F (36.9 °C) (Oral)   11/25/19 98.2 °F (36.8 °C) (Oral)     Recent Labs     03/24/21  0413 03/25/21  0623 03/26/21  0955   WBC 10.8* 7.2 7.6     Recent Labs     03/24/21  0413 03/25/21  0623   BUN 41*  --    CREATININE 2.5* 2.7*     Estimated Creatinine Clearance: 45 mL/min (A) (based on SCr of 2.7 mg/dL (H)). Intake/Output Summary (Last 24 hours) at 3/26/2021 1500  Last data filed at 3/26/2021 1406  Gross per 24 hour   Intake --   Output 2475 ml   Net -2475 ml     Pertinent Cultures:  Date    Source    Results  3/24   Foot wound         MSSA, strep pyogenes              Vancomycin level:   TROUGH:    Recent Labs     03/26/21  0955   VANCOTROUGH 13.6     RANDOM:    Recent Labs     03/25/21  0623   VANCORANDOM 10.6       Assessment:  · WBC and temperature: WBC down to normal the last 2 days, pt remains afebrile  · SCr, BUN, and urine output: CARMEN, SCR trending upward to 2.7 yesterday, UOP good. · MSSA and strep pyogenes in the foot wound, de-escalation may be considered. · Day(s) of therapy:  4  · Vancomycin concentration:  3/25 - 10.6 (34 hours post 1500mg dose)                                                      3/26 - 13.6 (22 hours post 1500mg dose)    Plan:  · Vanco level therapeutic @13.6 this am.  q24h frequency appears to be an appropriate regimen, however, no new SCR result this am.  Will not schedule a set frequency until CARMEN is resolving.   · Due to CARMEN, intermittent vanco dosing based on levels and renal trends remains appropriate  · Give vancomycin 1,500mg ivpb x1 dose today and recheck the vanco level tomorrow am  · Pharmacy will continue to monitor patient and adjust therapy as indicated    VANCOMYCIN CONCENTRATION SCHEDULED FOR 3/27 @10:00    Thank you for the consult. Dhaval Baum   3/26/2021 3:00 PM

## 2021-03-26 NOTE — CONSULTS
Pt seen ,examined,interviewed and chart reviewed. Please see the dictated consult for details     Imp :   1. CARMEN- CKd stage 3 A3- potential  etiology ATI  from infection - but also may have AIN  from meds - does not seem hypovolemia - also renal us no obstriction  2. Ac cellulitis with ch would with underlying diabetic  neuropathy   3. I suspect he has   nephrotic  syndrome form DKD with alb 2.2 - massive alb in ua in the past and some peripheral edema   4. ch DM/ HTN/ episode of ? Pericarditis etc     Plan:  1. Stop IVF  after 1 liter   2. ua , urinary indices   3. CB with diff and   CMP in am   4. If cr does not improve and has evidence of AIN than will consider changing abx   5. Follow clinical  6.  Labs in am       Thanks for the consult    #[id number]

## 2021-03-27 LAB
ALBUMIN SERPL-MCNC: 2.4 GM/DL (ref 3.4–5)
ALP BLD-CCNC: 66 IU/L (ref 40–128)
ALT SERPL-CCNC: 18 U/L (ref 10–40)
ANION GAP SERPL CALCULATED.3IONS-SCNC: 7 MMOL/L (ref 4–16)
AST SERPL-CCNC: 16 IU/L (ref 15–37)
BANDED NEUTROPHILS ABSOLUTE COUNT: 0.91 K/CU MM
BANDED NEUTROPHILS RELATIVE PERCENT: 11 % (ref 5–11)
BASOPHILS ABSOLUTE: 0.1 K/CU MM
BASOPHILS RELATIVE PERCENT: 1 % (ref 0–1)
BILIRUB SERPL-MCNC: 0.2 MG/DL (ref 0–1)
BUN BLDV-MCNC: 39 MG/DL (ref 6–23)
CALCIUM SERPL-MCNC: 7.6 MG/DL (ref 8.3–10.6)
CHLORIDE BLD-SCNC: 105 MMOL/L (ref 99–110)
CO2: 22 MMOL/L (ref 21–32)
CREAT SERPL-MCNC: 1.8 MG/DL (ref 0.9–1.3)
DIFFERENTIAL TYPE: ABNORMAL
DOSE AMOUNT: NORMAL
DOSE TIME: NORMAL
EOSINOPHILS ABSOLUTE: 0.4 K/CU MM
EOSINOPHILS RELATIVE PERCENT: 5 % (ref 0–3)
GFR AFRICAN AMERICAN: 50 ML/MIN/1.73M2
GFR NON-AFRICAN AMERICAN: 41 ML/MIN/1.73M2
GLUCOSE BLD-MCNC: 231 MG/DL (ref 70–99)
GLUCOSE BLD-MCNC: 245 MG/DL (ref 70–99)
GLUCOSE BLD-MCNC: 245 MG/DL (ref 70–99)
GLUCOSE BLD-MCNC: 248 MG/DL (ref 70–99)
GLUCOSE BLD-MCNC: 269 MG/DL (ref 70–99)
HCT VFR BLD CALC: 28.8 % (ref 42–52)
HEMOGLOBIN: 9.7 GM/DL (ref 13.5–18)
LYMPHOCYTES ABSOLUTE: 1.2 K/CU MM
LYMPHOCYTES RELATIVE PERCENT: 15 % (ref 24–44)
MCH RBC QN AUTO: 30.9 PG (ref 27–31)
MCHC RBC AUTO-ENTMCNC: 33.7 % (ref 32–36)
MCV RBC AUTO: 91.7 FL (ref 78–100)
MONOCYTES ABSOLUTE: 0.6 K/CU MM
MONOCYTES RELATIVE PERCENT: 7 % (ref 0–4)
MYELOCYTE PERCENT: 1 %
MYELOCYTES ABSOLUTE COUNT: 0.08 K/CU MM
PDW BLD-RTO: 12.1 % (ref 11.7–14.9)
PHOSPHORUS: 3.9 MG/DL (ref 2.5–4.9)
PLATELET # BLD: 243 K/CU MM (ref 140–440)
PMV BLD AUTO: 9.8 FL (ref 7.5–11.1)
POTASSIUM SERPL-SCNC: 4.4 MMOL/L (ref 3.5–5.1)
RBC # BLD: 3.14 M/CU MM (ref 4.6–6.2)
SEGMENTED NEUTROPHILS ABSOLUTE COUNT: 5 K/CU MM
SEGMENTED NEUTROPHILS RELATIVE PERCENT: 60 % (ref 36–66)
SODIUM BLD-SCNC: 134 MMOL/L (ref 135–145)
TOTAL PROTEIN: 5.2 GM/DL (ref 6.4–8.2)
VANCOMYCIN TROUGH: 14 UG/ML (ref 10–20)
WBC # BLD: 8.3 K/CU MM (ref 4–10.5)

## 2021-03-27 PROCEDURE — 85007 BL SMEAR W/DIFF WBC COUNT: CPT

## 2021-03-27 PROCEDURE — 94761 N-INVAS EAR/PLS OXIMETRY MLT: CPT

## 2021-03-27 PROCEDURE — 6370000000 HC RX 637 (ALT 250 FOR IP): Performed by: INTERNAL MEDICINE

## 2021-03-27 PROCEDURE — 36415 COLL VENOUS BLD VENIPUNCTURE: CPT

## 2021-03-27 PROCEDURE — 81050 URINALYSIS VOLUME MEASURE: CPT

## 2021-03-27 PROCEDURE — 84100 ASSAY OF PHOSPHORUS: CPT

## 2021-03-27 PROCEDURE — 84166 PROTEIN E-PHORESIS/URINE/CSF: CPT

## 2021-03-27 PROCEDURE — 82962 GLUCOSE BLOOD TEST: CPT

## 2021-03-27 PROCEDURE — 2580000003 HC RX 258: Performed by: FAMILY MEDICINE

## 2021-03-27 PROCEDURE — 80202 ASSAY OF VANCOMYCIN: CPT

## 2021-03-27 PROCEDURE — 6370000000 HC RX 637 (ALT 250 FOR IP): Performed by: FAMILY MEDICINE

## 2021-03-27 PROCEDURE — 80053 COMPREHEN METABOLIC PANEL: CPT

## 2021-03-27 PROCEDURE — 1200000000 HC SEMI PRIVATE

## 2021-03-27 PROCEDURE — 85027 COMPLETE CBC AUTOMATED: CPT

## 2021-03-27 PROCEDURE — 6360000002 HC RX W HCPCS: Performed by: FAMILY MEDICINE

## 2021-03-27 PROCEDURE — 84156 ASSAY OF PROTEIN URINE: CPT

## 2021-03-27 RX ORDER — AMLODIPINE BESYLATE 10 MG/1
10 TABLET ORAL DAILY
Status: DISCONTINUED | OUTPATIENT
Start: 2021-03-27 | End: 2021-03-29 | Stop reason: HOSPADM

## 2021-03-27 RX ADMIN — MORPHINE SULFATE 4 MG: 4 INJECTION, SOLUTION INTRAMUSCULAR; INTRAVENOUS at 16:41

## 2021-03-27 RX ADMIN — SODIUM CHLORIDE, PRESERVATIVE FREE 10 ML: 5 INJECTION INTRAVENOUS at 08:16

## 2021-03-27 RX ADMIN — SODIUM CHLORIDE, PRESERVATIVE FREE 10 ML: 5 INJECTION INTRAVENOUS at 20:55

## 2021-03-27 RX ADMIN — INSULIN LISPRO 2 UNITS: 100 INJECTION, SOLUTION INTRAVENOUS; SUBCUTANEOUS at 12:55

## 2021-03-27 RX ADMIN — HEPARIN SODIUM 5000 UNITS: 5000 INJECTION INTRAVENOUS; SUBCUTANEOUS at 23:03

## 2021-03-27 RX ADMIN — LACTULOSE 20 G: 10 SOLUTION ORAL at 20:55

## 2021-03-27 RX ADMIN — INSULIN LISPRO 2 UNITS: 100 INJECTION, SOLUTION INTRAVENOUS; SUBCUTANEOUS at 08:03

## 2021-03-27 RX ADMIN — OXYCODONE HYDROCHLORIDE AND ACETAMINOPHEN 1 TABLET: 7.5; 325 TABLET ORAL at 12:59

## 2021-03-27 RX ADMIN — MORPHINE SULFATE 4 MG: 4 INJECTION, SOLUTION INTRAMUSCULAR; INTRAVENOUS at 18:26

## 2021-03-27 RX ADMIN — VANCOMYCIN HYDROCHLORIDE 1500 MG: 5 INJECTION, POWDER, LYOPHILIZED, FOR SOLUTION INTRAVENOUS at 16:30

## 2021-03-27 RX ADMIN — AMLODIPINE BESYLATE 10 MG: 10 TABLET ORAL at 16:41

## 2021-03-27 RX ADMIN — HEPARIN SODIUM 5000 UNITS: 5000 INJECTION INTRAVENOUS; SUBCUTANEOUS at 06:19

## 2021-03-27 RX ADMIN — HYDRALAZINE HYDROCHLORIDE 50 MG: 50 TABLET ORAL at 08:15

## 2021-03-27 RX ADMIN — HYDRALAZINE HYDROCHLORIDE 50 MG: 50 TABLET ORAL at 20:55

## 2021-03-27 RX ADMIN — OXYCODONE HYDROCHLORIDE AND ACETAMINOPHEN 1 TABLET: 7.5; 325 TABLET ORAL at 20:55

## 2021-03-27 RX ADMIN — HYDRALAZINE HYDROCHLORIDE 50 MG: 50 TABLET ORAL at 14:51

## 2021-03-27 RX ADMIN — HEPARIN SODIUM 5000 UNITS: 5000 INJECTION INTRAVENOUS; SUBCUTANEOUS at 14:51

## 2021-03-27 RX ADMIN — ASPIRIN 81 MG CHEWABLE TABLET 81 MG: 81 TABLET CHEWABLE at 08:14

## 2021-03-27 RX ADMIN — MORPHINE SULFATE 4 MG: 4 INJECTION, SOLUTION INTRAMUSCULAR; INTRAVENOUS at 08:30

## 2021-03-27 RX ADMIN — INSULIN LISPRO 3 UNITS: 100 INJECTION, SOLUTION INTRAVENOUS; SUBCUTANEOUS at 18:05

## 2021-03-27 RX ADMIN — OXYCODONE HYDROCHLORIDE AND ACETAMINOPHEN 1 TABLET: 7.5; 325 TABLET ORAL at 06:21

## 2021-03-27 RX ADMIN — PROMETHAZINE HYDROCHLORIDE 12.5 MG: 12.5 TABLET ORAL at 18:23

## 2021-03-27 RX ADMIN — INSULIN GLARGINE 50 UNITS: 100 INJECTION, SOLUTION SUBCUTANEOUS at 20:55

## 2021-03-27 ASSESSMENT — PAIN SCALES - GENERAL
PAINLEVEL_OUTOF10: 7
PAINLEVEL_OUTOF10: 5
PAINLEVEL_OUTOF10: 7
PAINLEVEL_OUTOF10: 5
PAINLEVEL_OUTOF10: 5
PAINLEVEL_OUTOF10: 7
PAINLEVEL_OUTOF10: 5
PAINLEVEL_OUTOF10: 7

## 2021-03-27 ASSESSMENT — PAIN DESCRIPTION - ONSET
ONSET: ON-GOING

## 2021-03-27 ASSESSMENT — PAIN DESCRIPTION - PAIN TYPE
TYPE: ACUTE PAIN

## 2021-03-27 ASSESSMENT — PAIN - FUNCTIONAL ASSESSMENT
PAIN_FUNCTIONAL_ASSESSMENT: PREVENTS OR INTERFERES SOME ACTIVE ACTIVITIES AND ADLS

## 2021-03-27 ASSESSMENT — PAIN DESCRIPTION - DESCRIPTORS
DESCRIPTORS: SHARP
DESCRIPTORS: SHARP

## 2021-03-27 ASSESSMENT — PAIN DESCRIPTION - LOCATION
LOCATION: LEG
LOCATION: FOOT

## 2021-03-27 ASSESSMENT — PAIN DESCRIPTION - PROGRESSION
CLINICAL_PROGRESSION: NOT CHANGED

## 2021-03-27 NOTE — PROGRESS NOTES
Attending Progress Note      PCP: Abiola Truong MD      Patient: Clint Norris   Gender: male  : 1975   Age: 39 y.o. MRN: [id number]  Room  :  [address] [place] [address]-      Date of Admission: 3/23/2021    Chief Complaint   Patient presents with    Leg Pain     L sided            Subjective: pain controlled . . comfortable . Medications:  Reviewed  Infusion Medications    sodium chloride Stopped (21 0430)    dextrose       Scheduled Medications    vancomycin  1,500 mg Intravenous Q24H    lactulose  20 g Oral BID    insulin glargine  50 Units Subcutaneous Nightly    insulin lispro  18 Units Subcutaneous TID WC    heparin (porcine)  5,000 Units Subcutaneous Q8H    sodium chloride flush  10 mL Intravenous 2 times per day    aspirin  81 mg Oral Daily    hydrALAZINE  50 mg Oral TID    nicotine  1 patch Transdermal Daily    insulin lispro  0-6 Units Subcutaneous TID WC    insulin lispro  0-3 Units Subcutaneous Nightly     PRN Meds: sodium chloride flush, acetaminophen, morphine **OR** morphine, oxyCODONE-acetaminophen, tiZANidine, glucose, dextrose, glucagon (rDNA), dextrose, promethazine **OR** ondansetron, potassium chloride **OR** potassium alternative oral replacement **OR** potassium chloride, magnesium sulfate      Intake/Output Summary (Last 24 hours) at 3/27/2021 1530  Last data filed at 3/27/2021 3990  Gross per 24 hour   Intake 661.67 ml   Output 1150 ml   Net -488. 33 ml       Exam:  BP (!) 166/87   Pulse 88   Temp 98.4 °F (36.9 °C) (Oral)   Resp 18   Ht 5' 9\" (1.753 m)   Wt 274 lb 12.8 oz (124.6 kg)   SpO2 97%   BMI 40.58 kg/m²   General appearance: No distress,   Respiratory:  good air entry , no Rales , No wheezing, or rhonchi,  Cardiovascular: RRR, with normal S1/S2 . Abdomen : Soft, non-tender, non-distended  , normal bowel sounds.   Legs :left leg redness, planter wound/bleedin, chronic with callus     Neurologic:  Alert and oriented ,        Labs:   Recent Labs 03/25/21  0623 03/26/21  0955 03/27/21  0600   WBC 7.2 7.6 8.3   HGB 10.1* 10.1* 9.7*   HCT 30.1* 30.2* 28.8*    224 243     Recent Labs     03/25/21  0623 03/26/21  0955 03/27/21  0600   NA  --   --  134*   K  --   --  4.4   CL  --   --  105   CO2  --   --  22   BUN  --   --  39*   CREATININE 2.7* 2.2* 1.8*   CALCIUM  --   --  7.6*   PHOS  --   --  3.9     Recent Labs     03/27/21  0600   AST 16   ALT 18   BILITOT 0.2   ALKPHOS 66     No results for input(s): INR in the last 72 hours. No results for input(s): Charjessica Deleonh in the last 72 hours. Assessment/Plan:  Active Hospital Problems    Diagnosis Date Noted    Cellulitis [L03.90] 03/23/2021   diabetic foot  DM II  , uncontrolled   Obesity   ARF               Assessment/Plan:   CSM  Tele   IV ABX . .. c/s and  sensitivity noted. .. d/c rocephin . . cont Vanco     Wound care input noted . .. podiatrist saw pt yesterday . . plan for outpt f/u . Leilani Lucia bed side debridement done . ARF . Leilani Lucia IVF , d/c lisinopril , renal U/S : no concern , Nephro input noted    Cont  heparin   Glucose control. . furhter Increase lantus and insulin for better glucose control   Will consider PICC line to cont IV ABX on discharge , if needed   Foot xr : no sign of abscess or osteomyelitis;   Home meds , reviewed and resumed as appropriate   Symptoms releif/Pain control  DVT proph    no fever - on room air   DVT Prophylaxis   Diet: DIET CARB CONTROL; Carb Control: 4 carb choices (60 gms)/meal  Dietary Nutrition Supplements: Low Calorie High Protein Supplement  Code Status: Full Code        Treatment progress and plan was d/w pt/family .     Nba Charles MD

## 2021-03-27 NOTE — PLAN OF CARE
Problem: Falls - Risk of:  Goal: Will remain free from falls  Description: Will remain free from falls  3/27/2021 1722 by Mitchell Fernandez RN  Outcome: Ongoing  3/27/2021 0614 by Parrish Farmer RN  Outcome: Ongoing  Goal: Absence of physical injury  Description: Absence of physical injury  3/27/2021 1722 by Mitchell Fernandez RN  Outcome: Ongoing  3/27/2021 0614 by Parrish Farmer RN  Outcome: Ongoing     Problem: Pain:  Goal: Pain level will decrease  Description: Pain level will decrease  3/27/2021 1722 by Mitchell Fernandez RN  Outcome: Ongoing  3/27/2021 0614 by Parrish Farmer RN  Outcome: Ongoing  Goal: Control of acute pain  Description: Control of acute pain  3/27/2021 1722 by Mitchell Fernandez RN  Outcome: Ongoing  3/27/2021 0614 by Parrish Farmer RN  Outcome: Ongoing  Goal: Control of chronic pain  Description: Control of chronic pain  3/27/2021 1722 by Mitchell Fernandez RN  Outcome: Ongoing  3/27/2021 0614 by Parrish Farmer RN  Outcome: Ongoing

## 2021-03-27 NOTE — CONSULTS
[address], [address] [facility]                                  CONSULTATION    PATIENT NAME: Lyndsey Oconnor                     :        1975  MED REC NO:   [id number]                          ROOM:       4001  ACCOUNT NO:   [de-identified]                           ADMIT DATE: 2021  PROVIDER:     Mamie Medina MD    CONSULT DATE:  2021    CONSULT REQUESTED BY:  Federico Brown MD    REASON FOR CONSULT:  Acute kidney injury with underlying CKD stage III. BRIEF HISTORY:  The patient is an unfortunate 45-year-old male with a  long history of diabetes who presented to the emergency room on  2021 with increasing leg pain, erythema, fever, chills, and rigor. The patient says he noticed this on Saturday and since the symptom  continued to worsen, which made him concerned to come to the emergency  room as he has had infection before and underwent several amputations of  the toe before. He obviously has diabetic neuropathy. In the emergency  room, he was hemodynamically stable and underwent several diagnostic  tests, which include imaging and biochemical.  Imaging study which  includes x-ray of the foot showed ulceration of the plantar aspect. Biochemical testing particularly renal function showed creatinine of 1.9  and BUN of 30, which is much higher than his recent baseline creatinine  of 1.2, which was done in 2019. We do not have anything in between. He was subsequently admitted for further evaluation. So far his hospital course is consistent with empirical antibiotic  mainly with vancomycin and ceftriaxone. He was seen by Podiatry and  underwent bedside debridement. There was no apparent pus found. Unfortunately, his creatinine went up to 2.7 yesterday, although it did  go down to 2.2 this morning, hence the Renal consult was requested.     It looks like he has longstanding diabetic kidney disease. His  creatinine was 0.7 to 0.8 since , but it did increase to 1.1 to 2  range lately, although he had several peaks in between, but  interestingly he had an urinalysis done that is available in the Epic  system back in 2019. He had more than 500 mg/dL of albuminuria. It  looks like he has had since 2019. I suspect that he might have  nephrotic range proteinuria or even nephrotic syndrome as his albumin is  rather low about 2.2 and has some peripheral edema. PAST MEDICAL HISTORY:  1.  CKD stage IIIA3.  2.  Diabetes mellitus diagnosed 15 years ago, although initially he was  on a combination of insulin and oral pill, but right now exclusively on  insulin for the last 15 years. Does have diabetic retinopathy  apparently and I suspect he might have diabetic nephropathy too of  course. 3.  Hypertension longstanding. 4.  One episode of pericarditis. Apparently, he had a heart cath 10  years ago. No obstructive coronary lesion. 5.  He might have obstructive sleep apnea, but he is not on CPAP  machine. 6.  Diabetic neuropathy with chronic pain. HOME MEDICATIONS:  He might have had ibuprofen at home. He is also on  Lantus, Tradjenta, metformin, hydralazine, tizanidine, lisinopril 2.5 mg  daily, Zofran, aspirin, and several insulin regimens. HABITS:  He smokes half pack per day, has been doing it for the last 20  years or so. He does social alcohol and occasionally smokes marijuana. No other illicit drug abuse or IV drug abuse. FAMILY MEDICAL HISTORY:  Pretty significant. His father was diabetic  and had end-stage kidney disease and  from complication of it in  [id number]. Mom also had malignancy. SOCIAL HISTORY:  The patient is  since . He does have four  children. He is a , but he is currently on disability for the last  four years or so. He is born and raised here in [place] [address].     CURRENT MEDICATIONS:  Here in the hospital:  He is on normal saline at 100 mL an hour which I will stop after one liter. He is also on  Rocephin 1 gm daily and vancomycin, level does not seem to be that high. He is also on hydralazine 50 mg t.i.d., aspirin, several insulin  regimens, lactulose and Nicoderm patch. His lisinopril had been  stopped. REVIEW OF SYSTEMS:  He has some leg pain, but no shortness of breath, no  itching, and no rash. No fever, chills, or rigor right now. No  shortness of breath or urinary symptom. Rest of the review of systems  is negative or as in previous paragraph. PHYSICAL EXAMINATION:  VITAL SIGNS:  At the time of examination revealed, T-max was 98.2; blood  pressure 130 to 140s/70s; pulse in 80s; respiratory rate 17; saturating  about 97%. HEENT:  Head and Neck:  Normocephalic, atraumatic. Eyes:  No  conjunctival pallor. CARDIOVASCULAR:  Regular rate and rhythm. RESPIRATORY:  I did not hear any crackles. ABDOMEN:  Soft. EXTREMITIES:  1+ edema bilaterally. His wound in the left leg is  wrapped. LABORATORY VALUES AND ANCILLARY SERVICES:  As mentioned earlier. He is  anemic with hemoglobin 10.1. IMPRESSION:  A 75-year-old male with acute kidney injury. 1.  Acute kidney injury on top of CKD stage IIIA3. Potential etiology  ATI from infection. Whether he took some NSAID at home that might have  played some role too, but also may have acute interstitial nephritis  from medication. He does not seem to be hypovolemic at this time and  _____ obstruction. 2.  Acute cellulitis with chronic wound with underlying diabetic  neuropathy. 3.  I suspect he has nephrotic syndrome from diabetic kidney disease  with albumin of 2.2. He had massive albuminuria in the urine and also  had some peripheral edema. He would qualify, although his low albumin  could be from infection too. 4.  Chronic diabetes, hypertension, one episode of pericarditis  apparently. PLAN:  I will stop IV fluid after one liter. He is eating and drinking.   His blood pressure is okay. We will get UA and urinary indices. CBC  with differential.  CMP in the morning. If his creatinine does not  improve and has evidence of acute interstitial nephritis then we will  consider changing his antibiotic. Avoid of course NSAID. In the long  run though he obviously would need optimization of renal protective  medication. Otherwise, follow clinically and good blood sugar control.         Kayla Granados MD    D: 03/26/2021 16:27:34       T: 03/26/2021 19:46:54     [email or web address]  Job#: [id number]     Doc#: [id number]    CC:

## 2021-03-27 NOTE — PROGRESS NOTES
[address]  consulted by Dr. Alyssa Bernard for monitoring and adjustment. Indication for treatment: Cellulitis of the lower extremity  Goal trough: 10-15 mcg/mL    Pertinent Laboratory Values:   Temp Readings from Last 3 Encounters:   03/26/21 98.4 °F (36.9 °C) (Oral)   03/22/21 98.4 °F (36.9 °C) (Oral)   11/25/19 98.2 °F (36.8 °C) (Oral)     Recent Labs     03/25/21  0623 03/26/21  0955 03/27/21  0600   WBC 7.2 7.6 8.3     Recent Labs     03/25/21  0623 03/26/21  0955 03/27/21  0600   BUN  --   --  39*   CREATININE 2.7* 2.2* 1.8*     Estimated Creatinine Clearance: 68 mL/min (A) (based on SCr of 1.8 mg/dL (H)). Intake/Output Summary (Last 24 hours) at 3/27/2021 1026  Last data filed at 3/27/2021 4490  Gross per 24 hour   Intake 661.67 ml   Output 2025 ml   Net -1363.33 ml     Pertinent Cultures:  Date    Source    Results  3/24   Foot wound                             MSSA, strep pyogenes              Vancomycin level:   TROUGH:    Recent Labs     03/26/21  0955 03/27/21  0925   VANCOTROUGH 13.6 14.0     RANDOM:    Recent Labs     03/25/21  0623   VANCORANDOM 10.6       Assessment:  · WBC and temperature: leukocytosis has resolved with patient remaining afebrile  · SCr, BUN, and urine output: patient remains in CARMEN but SCr improving. · MSSA and strep pyogenes in the foot wound, de-escalation may be considered.   · Day(s) of therapy:  4  · Vancomycin concentration:   · 3/25 - 10.6 (34 hours post 1500mg dose)  · 3/26 - 13.6 (22 hours post 1500mg dose)  · 3/27 - 14.0 (20 hours post 1,500mg dose)      Plan:  · Vancomycin level therapeutic @ 14.0 this am. Q24H frequency appears to be an appropriate regimen, especially with renal function improving  · Vancomycin 1,500mg q24h will be initiated  · Consider de-escalation as wound cultures show MSSA  · Pharmacy will continue to monitor patient and adjust therapy as indicated    VANCOMYCIN CONCENTRATION SCHEDULED FOR 3/29/21 @

## 2021-03-27 NOTE — PROGRESS NOTES
Nephrology Progress Note  3/27/2021 3:42 PM        Subjective:   Admit Date: 3/23/2021  PCP: Ave Vinson MD    Interval History: pt seen in early am     Diet: good    ROS:  No sob  Per pt good UOP   > 3 l/d     Data:     Current meds:    vancomycin  1,500 mg Intravenous Q24H    lactulose  20 g Oral BID    insulin glargine  50 Units Subcutaneous Nightly    insulin lispro  18 Units Subcutaneous TID WC    heparin (porcine)  5,000 Units Subcutaneous Q8H    sodium chloride flush  10 mL Intravenous 2 times per day    aspirin  81 mg Oral Daily    hydrALAZINE  50 mg Oral TID    nicotine  1 patch Transdermal Daily    insulin lispro  0-6 Units Subcutaneous TID WC    insulin lispro  0-3 Units Subcutaneous Nightly      sodium chloride Stopped (03/27/21 0430)    dextrose           I/O last 3 completed shifts: In: 661.7 [P.O.:360; I.V.:301.7]  Out: 1150 [GYDDA:5646]    CBC:   Recent Labs     03/25/21  0623 03/26/21  0955 03/27/21  0600   WBC 7.2 7.6 8.3   HGB 10.1* 10.1* 9.7*    224 243          Recent Labs     03/25/21  0623 03/26/21  0955 03/27/21  0600   NA  --   --  134*   K  --   --  4.4   CL  --   --  105   CO2  --   --  22   BUN  --   --  39*   CREATININE 2.7* 2.2* 1.8*   GLUCOSE  --   --  248*       Lab Results   Component Value Date    CALCIUM 7.6 (L) 03/27/2021    PHOS 3.9 03/27/2021       Objective:     Vitals: BP (!) 166/87   Pulse 88   Temp 98.4 °F (36.9 °C) (Oral)   Resp 18   Ht 5' 9\" (1.753 m)   Wt 274 lb 12.8 oz (124.6 kg)   SpO2 97%   BMI 40.58 kg/m²     General appearance:  No distress  HEENT:  No conj pallor  Neck:  supple  Lungs:  No crackles  Heart:  Seems RRr  Abdomen: soft  Extremities:  LLE wound  ++ edema   RLT + edema       Problem List :         Impression :     1.  CARMEN- CKD stage 3 A3- his [place] was bland - so likely had ATI- recovering but has massive proteinuria - spot prt/cr is an error - he cannot have 1.1 mg/ dl cr - will make -  total 33 mg creatinine /  Day -

## 2021-03-28 LAB
ALBUMIN SERPL-MCNC: 2.4 GM/DL (ref 3.4–5)
ALP BLD-CCNC: 66 IU/L (ref 40–128)
ALT SERPL-CCNC: 16 U/L (ref 10–40)
ANION GAP SERPL CALCULATED.3IONS-SCNC: 10 MMOL/L (ref 4–16)
AST SERPL-CCNC: 13 IU/L (ref 15–37)
BILIRUB SERPL-MCNC: 0.2 MG/DL (ref 0–1)
BUN BLDV-MCNC: 35 MG/DL (ref 6–23)
CALCIUM SERPL-MCNC: 7.9 MG/DL (ref 8.3–10.6)
CHLORIDE BLD-SCNC: 103 MMOL/L (ref 99–110)
CO2: 22 MMOL/L (ref 21–32)
CREAT SERPL-MCNC: 1.8 MG/DL (ref 0.9–1.3)
GFR AFRICAN AMERICAN: 50 ML/MIN/1.73M2
GFR NON-AFRICAN AMERICAN: 41 ML/MIN/1.73M2
GLUCOSE BLD-MCNC: 224 MG/DL (ref 70–99)
GLUCOSE BLD-MCNC: 228 MG/DL (ref 70–99)
GLUCOSE BLD-MCNC: 229 MG/DL (ref 70–99)
GLUCOSE BLD-MCNC: 239 MG/DL (ref 70–99)
GLUCOSE BLD-MCNC: 243 MG/DL (ref 70–99)
HEPATITIS B SURFACE ANTIGEN: NON REACTIVE
HEPATITIS C ANTIBODY: NON REACTIVE
Lab: 24 HRS
PHOSPHORUS: 3.9 MG/DL (ref 2.5–4.9)
POTASSIUM SERPL-SCNC: 4.4 MMOL/L (ref 3.5–5.1)
PROTEIN 24 HOUR URINE: ABNORMAL MG/24 HR
SODIUM BLD-SCNC: 135 MMOL/L (ref 135–145)
TOTAL PROTEIN: 5.4 GM/DL (ref 6.4–8.2)
VOLUME, (UVOL): 3900 MLS

## 2021-03-28 PROCEDURE — 84100 ASSAY OF PHOSPHORUS: CPT

## 2021-03-28 PROCEDURE — 94761 N-INVAS EAR/PLS OXIMETRY MLT: CPT

## 2021-03-28 PROCEDURE — 6370000000 HC RX 637 (ALT 250 FOR IP): Performed by: INTERNAL MEDICINE

## 2021-03-28 PROCEDURE — 6360000002 HC RX W HCPCS: Performed by: FAMILY MEDICINE

## 2021-03-28 PROCEDURE — 87340 HEPATITIS B SURFACE AG IA: CPT

## 2021-03-28 PROCEDURE — 6370000000 HC RX 637 (ALT 250 FOR IP): Performed by: FAMILY MEDICINE

## 2021-03-28 PROCEDURE — 86160 COMPLEMENT ANTIGEN: CPT

## 2021-03-28 PROCEDURE — 82962 GLUCOSE BLOOD TEST: CPT

## 2021-03-28 PROCEDURE — 84165 PROTEIN E-PHORESIS SERUM: CPT

## 2021-03-28 PROCEDURE — 36415 COLL VENOUS BLD VENIPUNCTURE: CPT

## 2021-03-28 PROCEDURE — 83516 IMMUNOASSAY NONANTIBODY: CPT

## 2021-03-28 PROCEDURE — 86038 ANTINUCLEAR ANTIBODIES: CPT

## 2021-03-28 PROCEDURE — 83883 ASSAY NEPHELOMETRY NOT SPEC: CPT

## 2021-03-28 PROCEDURE — 1200000000 HC SEMI PRIVATE

## 2021-03-28 PROCEDURE — 2580000003 HC RX 258: Performed by: FAMILY MEDICINE

## 2021-03-28 PROCEDURE — 80053 COMPREHEN METABOLIC PANEL: CPT

## 2021-03-28 RX ADMIN — SODIUM CHLORIDE, PRESERVATIVE FREE 10 ML: 5 INJECTION INTRAVENOUS at 09:05

## 2021-03-28 RX ADMIN — OXYCODONE HYDROCHLORIDE AND ACETAMINOPHEN 1 TABLET: 7.5; 325 TABLET ORAL at 02:42

## 2021-03-28 RX ADMIN — OXYCODONE HYDROCHLORIDE AND ACETAMINOPHEN 1 TABLET: 7.5; 325 TABLET ORAL at 09:09

## 2021-03-28 RX ADMIN — HEPARIN SODIUM 5000 UNITS: 5000 INJECTION INTRAVENOUS; SUBCUTANEOUS at 22:21

## 2021-03-28 RX ADMIN — HEPARIN SODIUM 5000 UNITS: 5000 INJECTION INTRAVENOUS; SUBCUTANEOUS at 14:17

## 2021-03-28 RX ADMIN — OXYCODONE HYDROCHLORIDE AND ACETAMINOPHEN 1 TABLET: 7.5; 325 TABLET ORAL at 16:35

## 2021-03-28 RX ADMIN — INSULIN GLARGINE 50 UNITS: 100 INJECTION, SOLUTION SUBCUTANEOUS at 22:20

## 2021-03-28 RX ADMIN — SODIUM CHLORIDE, PRESERVATIVE FREE 10 ML: 5 INJECTION INTRAVENOUS at 20:11

## 2021-03-28 RX ADMIN — HEPARIN SODIUM 5000 UNITS: 5000 INJECTION INTRAVENOUS; SUBCUTANEOUS at 07:01

## 2021-03-28 RX ADMIN — INSULIN LISPRO 2 UNITS: 100 INJECTION, SOLUTION INTRAVENOUS; SUBCUTANEOUS at 12:25

## 2021-03-28 RX ADMIN — HYDRALAZINE HYDROCHLORIDE 50 MG: 50 TABLET ORAL at 20:11

## 2021-03-28 RX ADMIN — HYDRALAZINE HYDROCHLORIDE 50 MG: 50 TABLET ORAL at 14:17

## 2021-03-28 RX ADMIN — VANCOMYCIN HYDROCHLORIDE 1500 MG: 5 INJECTION, POWDER, LYOPHILIZED, FOR SOLUTION INTRAVENOUS at 16:35

## 2021-03-28 RX ADMIN — INSULIN LISPRO 2 UNITS: 100 INJECTION, SOLUTION INTRAVENOUS; SUBCUTANEOUS at 16:35

## 2021-03-28 RX ADMIN — INSULIN LISPRO 2 UNITS: 100 INJECTION, SOLUTION INTRAVENOUS; SUBCUTANEOUS at 09:02

## 2021-03-28 RX ADMIN — MORPHINE SULFATE 4 MG: 4 INJECTION, SOLUTION INTRAMUSCULAR; INTRAVENOUS at 20:11

## 2021-03-28 RX ADMIN — ASPIRIN 81 MG CHEWABLE TABLET 81 MG: 81 TABLET CHEWABLE at 09:02

## 2021-03-28 RX ADMIN — MORPHINE SULFATE 4 MG: 4 INJECTION, SOLUTION INTRAMUSCULAR; INTRAVENOUS at 06:59

## 2021-03-28 RX ADMIN — HYDRALAZINE HYDROCHLORIDE 50 MG: 50 TABLET ORAL at 09:02

## 2021-03-28 RX ADMIN — AMLODIPINE BESYLATE 10 MG: 10 TABLET ORAL at 09:02

## 2021-03-28 ASSESSMENT — PAIN SCALES - GENERAL
PAINLEVEL_OUTOF10: 7

## 2021-03-28 ASSESSMENT — PAIN DESCRIPTION - PAIN TYPE: TYPE: ACUTE PAIN;CHRONIC PAIN

## 2021-03-28 ASSESSMENT — PAIN DESCRIPTION - FREQUENCY: FREQUENCY: CONTINUOUS

## 2021-03-28 ASSESSMENT — PAIN DESCRIPTION - LOCATION: LOCATION: LEG

## 2021-03-28 ASSESSMENT — PAIN DESCRIPTION - ORIENTATION
ORIENTATION: LEFT
ORIENTATION: LEFT

## 2021-03-28 NOTE — PROGRESS NOTES
Nephrology Progress Note  3/28/2021 2:29 PM        Subjective:   Admit Date: 3/23/2021  PCP: Alondra Turcios MD    Interval History: no major event     Diet: good    ROS:  No sob , good UOP       Data:     Current meds:    vancomycin  1,500 mg Intravenous Q24H    amLODIPine  10 mg Oral Daily    lactulose  20 g Oral BID    insulin glargine  50 Units Subcutaneous Nightly    insulin lispro  18 Units Subcutaneous TID WC    heparin (porcine)  5,000 Units Subcutaneous Q8H    sodium chloride flush  10 mL Intravenous 2 times per day    aspirin  81 mg Oral Daily    hydrALAZINE  50 mg Oral TID    nicotine  1 patch Transdermal Daily    insulin lispro  0-6 Units Subcutaneous TID WC    insulin lispro  0-3 Units Subcutaneous Nightly      sodium chloride Stopped (03/27/21 0430)    dextrose           No intake/output data recorded. CBC:   Recent Labs     03/26/21  0955 03/27/21  0600   WBC 7.6 8.3   HGB 10.1* 9.7*    243          Recent Labs     03/26/21  0955 03/27/21  0600 03/28/21  0906   NA  --  134* 135   K  --  4.4 4.4   CL  --  105 103   CO2  --  22 22   BUN  --  39* 35*   CREATININE 2.2* 1.8* 1.8*   GLUCOSE  --  248* 224*       Lab Results   Component Value Date    CALCIUM 7.9 (L) 03/28/2021    PHOS 3.9 03/28/2021       Objective:     Vitals: /75   Pulse 85   Temp 97.9 °F (36.6 °C) (Oral)   Resp 18   Ht 5' 9\" (1.753 m)   Wt 274 lb 12.8 oz (124.6 kg)   SpO2 96%   BMI 40.58 kg/m²     General appearance:  No acc distress   HEENT:  No conj pallor  Neck:  supple  Lungs:  No crackles  Heart:  Seems RRR  Abdomen: soft  Extremities:  LLE ++ edema , less erythema , wound       RLE trace edema     Problem List :         Impression :     1. CARMEN- CKD stage 3 A3- recovering likely from ATI - peak cr was 2.7 on 3/25/21- baseline 1.5-1.6 darryl   2. HTN acceptable but he will need RAAS inh  3. DM likely with NS  4. LLE skin and soft tissue infection   5.  Underlying ASCVD     Recommendation/Plan  : 1. Good BS control'  2. He may benefit  from SGLt 2 inh as an  out pt and GLP 1 R agonist   3. Also RAASi  4. His serology was drawn- hep B/C neg   5.  Follow clinically       Roel Torres MD

## 2021-03-28 NOTE — PLAN OF CARE
Problem: Falls - Risk of:  Goal: Will remain free from falls  Description: Will remain free from falls  3/27/2021 2236 by Gia Penn RN  Outcome: Ongoing  3/27/2021 1722 by Sherron Barragan RN  Outcome: Ongoing  Goal: Absence of physical injury  Description: Absence of physical injury  3/27/2021 2236 by Gia Penn RN  Outcome: Ongoing  3/27/2021 1722 by Sherron Barragan RN  Outcome: Ongoing     Problem: Pain:  Goal: Pain level will decrease  Description: Pain level will decrease  3/27/2021 2236 by Gia Penn RN  Outcome: Ongoing  3/27/2021 1722 by Sherron Barragan RN  Outcome: Ongoing  Goal: Control of acute pain  Description: Control of acute pain  3/27/2021 2236 by Gia Penn RN  Outcome: Ongoing  3/27/2021 1722 by Sherron Barragan RN  Outcome: Ongoing  Goal: Control of chronic pain  Description: Control of chronic pain  3/27/2021 2236 by Gia Penn RN  Outcome: Ongoing  3/27/2021 1722 by Sherron Barragan RN  Outcome: Ongoing

## 2021-03-28 NOTE — PROGRESS NOTES
Attending Progress Note      PCP: Jodi Remy MD      Patient: Karan Chacon   Gender: male  : 1975   Age: 39 y.o. MRN: [id number]  Room  :  [address] [place] [address]      Date of Admission: 3/23/2021    Chief Complaint   Patient presents with    Leg Pain     L sided            Subjective: pain controlled . . comfortable . Medications:  Reviewed  Infusion Medications    sodium chloride Stopped (21 0430)    dextrose       Scheduled Medications    vancomycin  1,500 mg Intravenous Q24H    amLODIPine  10 mg Oral Daily    lactulose  20 g Oral BID    insulin glargine  50 Units Subcutaneous Nightly    insulin lispro  18 Units Subcutaneous TID WC    heparin (porcine)  5,000 Units Subcutaneous Q8H    sodium chloride flush  10 mL Intravenous 2 times per day    aspirin  81 mg Oral Daily    hydrALAZINE  50 mg Oral TID    nicotine  1 patch Transdermal Daily    insulin lispro  0-6 Units Subcutaneous TID WC    insulin lispro  0-3 Units Subcutaneous Nightly     PRN Meds: sodium chloride flush, acetaminophen, morphine **OR** morphine, oxyCODONE-acetaminophen, tiZANidine, glucose, dextrose, glucagon (rDNA), dextrose, promethazine **OR** ondansetron, potassium chloride **OR** potassium alternative oral replacement **OR** potassium chloride, magnesium sulfate      Intake/Output Summary (Last 24 hours) at 3/28/2021 1814  Last data filed at 3/28/2021 1227  Gross per 24 hour   Intake 480 ml   Output 750 ml   Net -270 ml       Exam:  BP (!) 145/78   Pulse 92   Temp 97.9 °F (36.6 °C) (Oral)   Resp 16   Ht 5' 9\" (1.753 m)   Wt 274 lb 12.8 oz (124.6 kg)   SpO2 98%   BMI 40.58 kg/m²   General appearance: No distress,   Respiratory:  good air entry , no Rales , No wheezing, or rhonchi,  Cardiovascular: RRR, with normal S1/S2 . Abdomen : Soft, non-tender, non-distended  , normal bowel sounds.   Legs :left leg redness, planter wound/bleedin, chronic with callus     Neurologic:  Alert and oriented ,        Labs:   Recent Labs     03/26/21  0955 03/27/21  0600   WBC 7.6 8.3   HGB 10.1* 9.7*   HCT 30.2* 28.8*    243     Recent Labs     03/26/21  0955 03/27/21  0600 03/28/21  0906   NA  --  134* 135   K  --  4.4 4.4   CL  --  105 103   CO2  --  22 22   BUN  --  39* 35*   CREATININE 2.2* 1.8* 1.8*   CALCIUM  --  7.6* 7.9*   PHOS  --  3.9 3.9     Recent Labs     03/27/21  0600 03/28/21  0906   AST 16 13*   ALT 18 16   BILITOT 0.2 0.2   ALKPHOS 66 66     No results for input(s): INR in the last 72 hours. No results for input(s): Kee Cobble in the last 72 hours. Assessment/Plan:  Active Hospital Problems    Diagnosis Date Noted    Cellulitis [L03.90] 03/23/2021   diabetic foot  DM II  , uncontrolled   Obesity   ARF               Assessment/Plan:   CSM  Tele   IV ABX . .. c/s and  sensitivity noted. .. d/c rocephin . . cont Vanco     Wound care input noted . .. podiatrist saw pt yesterday . . plan for outpt f/u . Chris Jiang bed side debridement done . ARF . Chris Jiang IVF , d/c lisinopril , renal U/S : no concern , Nephro input noted    Cont  heparin   Glucose control. . furhter Increase lantus and insulin for better glucose control   Will consider PICC line to cont IV ABX on discharge , if needed   Foot xr : no sign of abscess or osteomyelitis;   Home meds , reviewed and resumed as appropriate   Symptoms releif/Pain control  DVT proph    no fever - on room air   DVT Prophylaxis   Diet: DIET CARB CONTROL; Carb Control: 4 carb choices (60 gms)/meal  Dietary Nutrition Supplements: Low Calorie High Protein Supplement  Code Status: Full Code        Treatment progress and plan was d/w pt/family .     Jigna Jameson MD

## 2021-03-28 NOTE — PROGRESS NOTES
[facility]  consulted by Dr. Vishnu Tapia for monitoring and adjustment. Indication for treatment: Cellulitis of the lower extremity  Goal trough: 10-15 mcg/mL    Pertinent Laboratory Values:   Temp Readings from Last 3 Encounters:   03/28/21 97.9 °F (36.6 °C) (Oral)   03/22/21 98.4 °F (36.9 °C) (Oral)   11/25/19 98.2 °F (36.8 °C) (Oral)     Recent Labs     03/26/21  0955 03/27/21  0600   WBC 7.6 8.3     Recent Labs     03/26/21  0955 03/27/21  0600 03/28/21  0906   BUN  --  39* 35*   CREATININE 2.2* 1.8* 1.8*     Estimated Creatinine Clearance: 68 mL/min (A) (based on SCr of 1.8 mg/dL (H)). No intake or output data in the 24 hours ending 03/28/21 1110  Pertinent Cultures:  Date    Source    Results  3/24   Foot wound                             MSSA, strep pyogenes              Vancomycin level:   TROUGH:    Recent Labs     03/26/21  0955 03/27/21  0925   VANCOTROUGH 13.6 14.0     RANDOM:    No results for input(s): VANCORANDOM in the last 72 hours. Assessment:  · WBC and temperature: WBC WNL/afebrile  · SCr, BUN, and urine output: SCr elevated/stable   · MSSA and strep pyogenes in the foot wound, de-escalation may be considered. · Day(s) of therapy:  5  · Vancomycin concentration:   · 3/25 - 10.6 (34 hours post 1500mg dose)  · 3/26 - 13.6 (22 hours post 1500mg dose)  · 3/27 - 14.0 (20 hours post 1,500mg dose)    Plan:  · Vancomycin 1,500mg q24h   · Consider de-escalation as wound cultures show MSSA  · Pharmacy will continue to monitor patient and adjust therapy as indicated    VANCOMYCIN CONCENTRATION SCHEDULED FOR 3/29/21 @ 1191    Thank you for the consult.   Yulissa Arzate RPh   3/28/2021 11:10 AM

## 2021-03-29 VITALS
RESPIRATION RATE: 18 BRPM | OXYGEN SATURATION: 95 % | DIASTOLIC BLOOD PRESSURE: 83 MMHG | HEART RATE: 86 BPM | SYSTOLIC BLOOD PRESSURE: 146 MMHG | TEMPERATURE: 98.8 F | HEIGHT: 69 IN | WEIGHT: 274.8 LBS | BODY MASS INDEX: 40.7 KG/M2

## 2021-03-29 LAB
ALBUMIN SERPL-MCNC: 2.2 GM/DL (ref 3.4–5)
ANION GAP SERPL CALCULATED.3IONS-SCNC: 8 MMOL/L (ref 4–16)
BUN BLDV-MCNC: 33 MG/DL (ref 6–23)
CALCIUM SERPL-MCNC: 7.8 MG/DL (ref 8.3–10.6)
CHLORIDE BLD-SCNC: 107 MMOL/L (ref 99–110)
CO2: 23 MMOL/L (ref 21–32)
CREAT SERPL-MCNC: 1.7 MG/DL (ref 0.9–1.3)
CULTURE: ABNORMAL
GFR AFRICAN AMERICAN: 53 ML/MIN/1.73M2
GFR NON-AFRICAN AMERICAN: 44 ML/MIN/1.73M2
GLUCOSE BLD-MCNC: 201 MG/DL (ref 70–99)
GLUCOSE BLD-MCNC: 244 MG/DL (ref 70–99)
GLUCOSE BLD-MCNC: 289 MG/DL (ref 70–99)
GLUCOSE BLD-MCNC: 292 MG/DL (ref 70–99)
Lab: ABNORMAL
PHOSPHORUS: 4.2 MG/DL (ref 2.5–4.9)
POTASSIUM SERPL-SCNC: 4.8 MMOL/L (ref 3.5–5.1)
SODIUM BLD-SCNC: 138 MMOL/L (ref 135–145)
SPECIMEN: ABNORMAL

## 2021-03-29 PROCEDURE — 76937 US GUIDE VASCULAR ACCESS: CPT

## 2021-03-29 PROCEDURE — 36415 COLL VENOUS BLD VENIPUNCTURE: CPT

## 2021-03-29 PROCEDURE — 80202 ASSAY OF VANCOMYCIN: CPT

## 2021-03-29 PROCEDURE — 6370000000 HC RX 637 (ALT 250 FOR IP): Performed by: INTERNAL MEDICINE

## 2021-03-29 PROCEDURE — 36410 VNPNXR 3YR/> PHY/QHP DX/THER: CPT

## 2021-03-29 PROCEDURE — 82962 GLUCOSE BLOOD TEST: CPT

## 2021-03-29 PROCEDURE — 80069 RENAL FUNCTION PANEL: CPT

## 2021-03-29 PROCEDURE — 2580000003 HC RX 258: Performed by: FAMILY MEDICINE

## 2021-03-29 PROCEDURE — C1751 CATH, INF, PER/CENT/MIDLINE: HCPCS

## 2021-03-29 PROCEDURE — 05HC33Z INSERTION OF INFUSION DEVICE INTO LEFT BASILIC VEIN, PERCUTANEOUS APPROACH: ICD-10-PCS | Performed by: FAMILY MEDICINE

## 2021-03-29 PROCEDURE — 6360000002 HC RX W HCPCS: Performed by: FAMILY MEDICINE

## 2021-03-29 PROCEDURE — 6370000000 HC RX 637 (ALT 250 FOR IP): Performed by: FAMILY MEDICINE

## 2021-03-29 RX ORDER — AMLODIPINE BESYLATE 10 MG/1
10 TABLET ORAL DAILY
Qty: 30 TABLET | Refills: 3 | Status: CANCELLED | OUTPATIENT
Start: 2021-03-30

## 2021-03-29 RX ORDER — LIDOCAINE HYDROCHLORIDE 10 MG/ML
5 INJECTION, SOLUTION EPIDURAL; INFILTRATION; INTRACAUDAL; PERINEURAL ONCE
Status: CANCELLED | OUTPATIENT
Start: 2021-03-29 | End: 2021-03-29

## 2021-03-29 RX ORDER — SODIUM CHLORIDE 0.9 % (FLUSH) 0.9 %
10 SYRINGE (ML) INJECTION EVERY 12 HOURS SCHEDULED
Status: DISCONTINUED | OUTPATIENT
Start: 2021-03-29 | End: 2021-03-29 | Stop reason: HOSPADM

## 2021-03-29 RX ORDER — SODIUM CHLORIDE 0.9 % (FLUSH) 0.9 %
10 SYRINGE (ML) INJECTION EVERY 12 HOURS SCHEDULED
Status: CANCELLED | OUTPATIENT
Start: 2021-03-29

## 2021-03-29 RX ORDER — SODIUM CHLORIDE 0.9 % (FLUSH) 0.9 %
10 SYRINGE (ML) INJECTION PRN
Status: CANCELLED | OUTPATIENT
Start: 2021-03-29

## 2021-03-29 RX ORDER — LIDOCAINE HYDROCHLORIDE 10 MG/ML
5 INJECTION, SOLUTION EPIDURAL; INFILTRATION; INTRACAUDAL; PERINEURAL ONCE
Status: DISCONTINUED | OUTPATIENT
Start: 2021-03-29 | End: 2021-03-29 | Stop reason: HOSPADM

## 2021-03-29 RX ORDER — OXYCODONE AND ACETAMINOPHEN 7.5; 325 MG/1; MG/1
1 TABLET ORAL EVERY 8 HOURS PRN
Qty: 10 TABLET | Refills: 0 | Status: SHIPPED | OUTPATIENT
Start: 2021-03-29 | End: 2021-04-04

## 2021-03-29 RX ORDER — SODIUM CHLORIDE 0.9 % (FLUSH) 0.9 %
10 SYRINGE (ML) INJECTION PRN
Status: DISCONTINUED | OUTPATIENT
Start: 2021-03-29 | End: 2021-03-29 | Stop reason: HOSPADM

## 2021-03-29 RX ORDER — INSULIN GLARGINE 100 [IU]/ML
50 INJECTION, SOLUTION SUBCUTANEOUS NIGHTLY
Qty: 1 VIAL | Refills: 0 | Status: CANCELLED | OUTPATIENT
Start: 2021-03-29

## 2021-03-29 RX ORDER — INSULIN GLARGINE 100 [IU]/ML
40 INJECTION, SOLUTION SUBCUTANEOUS NIGHTLY
Qty: 1 VIAL | Refills: 5 | Status: ON HOLD | OUTPATIENT
Start: 2021-03-29 | End: 2021-05-21 | Stop reason: SDUPTHER

## 2021-03-29 RX ADMIN — HEPARIN SODIUM 5000 UNITS: 5000 INJECTION INTRAVENOUS; SUBCUTANEOUS at 07:07

## 2021-03-29 RX ADMIN — HYDRALAZINE HYDROCHLORIDE 50 MG: 50 TABLET ORAL at 09:27

## 2021-03-29 RX ADMIN — VANCOMYCIN HYDROCHLORIDE 1500 MG: 5 INJECTION, POWDER, LYOPHILIZED, FOR SOLUTION INTRAVENOUS at 15:36

## 2021-03-29 RX ADMIN — MORPHINE SULFATE 4 MG: 4 INJECTION, SOLUTION INTRAMUSCULAR; INTRAVENOUS at 09:32

## 2021-03-29 RX ADMIN — INSULIN LISPRO 3 UNITS: 100 INJECTION, SOLUTION INTRAVENOUS; SUBCUTANEOUS at 12:21

## 2021-03-29 RX ADMIN — OXYCODONE HYDROCHLORIDE AND ACETAMINOPHEN 1 TABLET: 7.5; 325 TABLET ORAL at 04:53

## 2021-03-29 RX ADMIN — MORPHINE SULFATE 2 MG: 2 INJECTION, SOLUTION INTRAMUSCULAR; INTRAVENOUS at 18:09

## 2021-03-29 RX ADMIN — ASPIRIN 81 MG CHEWABLE TABLET 81 MG: 81 TABLET CHEWABLE at 09:27

## 2021-03-29 RX ADMIN — SODIUM CHLORIDE, PRESERVATIVE FREE 10 ML: 5 INJECTION INTRAVENOUS at 09:31

## 2021-03-29 RX ADMIN — HYDRALAZINE HYDROCHLORIDE 50 MG: 50 TABLET ORAL at 13:55

## 2021-03-29 RX ADMIN — AMLODIPINE BESYLATE 10 MG: 10 TABLET ORAL at 09:27

## 2021-03-29 RX ADMIN — HEPARIN SODIUM 5000 UNITS: 5000 INJECTION INTRAVENOUS; SUBCUTANEOUS at 15:10

## 2021-03-29 RX ADMIN — OXYCODONE HYDROCHLORIDE AND ACETAMINOPHEN 1 TABLET: 7.5; 325 TABLET ORAL at 13:55

## 2021-03-29 RX ADMIN — INSULIN LISPRO 3 UNITS: 100 INJECTION, SOLUTION INTRAVENOUS; SUBCUTANEOUS at 09:26

## 2021-03-29 RX ADMIN — INSULIN LISPRO 2 UNITS: 100 INJECTION, SOLUTION INTRAVENOUS; SUBCUTANEOUS at 16:55

## 2021-03-29 ASSESSMENT — PAIN SCALES - GENERAL
PAINLEVEL_OUTOF10: 7

## 2021-03-29 ASSESSMENT — PAIN SCALES - WONG BAKER
WONGBAKER_NUMERICALRESPONSE: 0

## 2021-03-29 NOTE — PROGRESS NOTES
Nephrology Progress Note  3/29/2021 2:54 PM        Subjective:   Admit Date: 3/23/2021  PCP: Falguni Levin MD    Interval History: no major event     Diet: good    ROS:  No sob , good UOP    Data:     Current meds:    vancomycin  1,500 mg Intravenous Q24H    amLODIPine  10 mg Oral Daily    lactulose  20 g Oral BID    insulin glargine  50 Units Subcutaneous Nightly    insulin lispro  18 Units Subcutaneous TID WC    heparin (porcine)  5,000 Units Subcutaneous Q8H    sodium chloride flush  10 mL Intravenous 2 times per day    aspirin  81 mg Oral Daily    hydrALAZINE  50 mg Oral TID    nicotine  1 patch Transdermal Daily    insulin lispro  0-6 Units Subcutaneous TID WC    insulin lispro  0-3 Units Subcutaneous Nightly      sodium chloride Stopped (03/27/21 0430)    dextrose           I/O last 3 completed shifts: In: 480 [P.O.:480]  Out: 1375 [Urine:1375]    CBC:   Recent Labs     03/27/21  0600   WBC 8.3   HGB 9.7*             Recent Labs     03/27/21  0600 03/28/21  0906 03/29/21  0841   * 135 138   K 4.4 4.4 4.8    103 107   CO2 22 22 23   BUN 39* 35* 33*   CREATININE 1.8* 1.8* 1.7*   GLUCOSE 248* 224* 244*       Lab Results   Component Value Date    CALCIUM 7.8 (L) 03/29/2021    PHOS 4.2 03/29/2021       Objective:     Vitals: BP (!) 146/83   Pulse 86   Temp 98.8 °F (37.1 °C) (Oral)   Resp 18   Ht 5' 9\" (1.753 m)   Wt 274 lb 12.8 oz (124.6 kg)   SpO2 95%   BMI 40.58 kg/m²     General appearance:  No ditress  HEENT:  No pallor  Neck:  supple  Lungs:  No crackles  Heart:  Seems RRR  Abdomen: soft  Extremities:  LLE ++ edema , less erythema   RLE + edema       Problem List :         Impression :     1. CARMEN- CKD stage 3 a a3- recovering likely had ATI   2. HTN acceptable  - need RRASi  As an  out pt - I will adjust it   3. DM with proteinuria( NS)  - he  Has  almost 14 G/d - serology pending   4.  Ac skin and soft tissue infection - better -   5. underlying ASCVD

## 2021-03-29 NOTE — PROGRESS NOTES
Patient A&Ox4, independent. Refused bed alarm. Pt. Educated on risk of falls. Patient verbalized understanding.

## 2021-03-29 NOTE — DISCHARGE SUMMARY
Patient: Lukas Mallory MD      Gender: male  : 1975   Age: 39 y.o. MRN: [id number]    Admitting Physician: Nba Charles MD  Discharge Physician: Nba Charles MD     Code Status: Full Code     Admit Date: 3/23/2021   Discharge Date: 21      Disposition:  Home       Condition at Discharge:  stable . Follow-up appointments:  f/u one week with PCP , and with consultants as recommended . Outpatient to do list: f/u     Pt`s preferred phone number :[phone]  [facility]  Extended Emergency Contact Information  Primary Emergency Contact: [facility]  Address: [address] [place] [address] Lali Bermudez [facility] Kashmir Penaloza[address] Phone: [phone]  Mobile Phone: [phone]  Relation: Spouse  Secondary Emergency Contact: Johnnie Schrader, [address] [facility]  Home Phone: [phone]  Mobile Phone: [phone]  Relation: Parent      Discharge Diagnoses: Active Hospital Problems    Diagnosis    Cellulitis [L03.90]   diabetic foot  DM II    Obesity     History of Present Illness:  Randy Woodson is a 39 y.o. male with   has a past medical history of Abscess, Acute renal failure (ARF) ([address] Anxiety associated with depression, Anxiety associated with depression, Back pain, Chest pain, Diabetic nephropathy ([address] Diabetic neuropathy ([address] Diabetic ulcer of left midfoot associated with type 2 diabetes mellitus, with fat layer exposed ([address] Diverticulosis, DM (diabetes mellitus), type 2 ([address] Hyperlipidemia LDL goal < 100, Hypertension, Internal hemorrhoid, Nose fracture, Panic attacks, Pericarditis, Peripheral autonomic neuropathy due to diabetes mellitus ([address] Sebaceous cyst, URI (upper respiratory infection), WD-Wound, surgical, infected, initial encounter, and Wrist fracture. who presented to ER with 3 days of left leg redness, pain and swelling . Also pt has   History obtained from patient .       Hospital Course:   IV ABX ... c/s and sensitivity noted. .. started with  Rocephin then switched to      Wound care    podiatrist saw pt  . . plan for outpt f/u . Chris Jiang bed side debridement done . ARF . Mamadoufrank Jiang Mamadoufrank Apolinar IVF , d/c lisinopril , renal U/S : no concern , Nephro consult     Glucose control. . furhter Increase lantus and insulin for better glucose control   Will consider PICC line to cont IV ABX on discharge , if needed   Foot xr : no sign of abscess or osteomyelitis;   Home meds , reviewed and resumed as appropriate   Symptoms releif/Pain control  DVT Prophylaxis   heparin   Diet: DIET CARB CONTROL; Carb Control: 4 carb choices (60 gms)/meal  Dietary Nutrition Supplements: Low Calorie High Protein Supplement  Code Status: Full Code      Consults. IP CONSULT TO INTERNAL MEDICINE  IP CONSULT TO PHARMACY  IP CONSULT TO PODIATRY  IP CONSULT TO NEPHROLOGY  IP CONSULT TO HOME CARE NEEDS        Discharge Medications:   Current Discharge Medication List      START taking these medications    Details   vancomycin (VANCOCIN) infusion Infuse 1,500 mg intravenously every 24 hours for 14 days Compound per protocol. Qty: 19942 mg, Refills: 0           Current Discharge Medication List      CONTINUE these medications which have CHANGED    Details   oxyCODONE-acetaminophen (PERCOCET) 7.5-325 MG per tablet Take 1 tablet by mouth every 8 hours as needed for Pain for up to 5 days.   Qty: 10 tablet, Refills: 0    Comments: Reduce doses taken as pain becomes manageable  Associated Diagnoses: Diabetic foot (HCC)      insulin glargine (LANTUS) 100 UNIT/ML injection vial Inject 40 Units into the skin nightly  Qty: 1 vial, Refills: 5      insulin lispro (HUMALOG) 100 UNIT/ML injection vial Inject 25 Units into the skin 3 times daily (with meals)  Qty: 1 vial, Refills: 5      linagliptin (TRADJENTA) 5 MG tablet Take 1 tablet by mouth daily  Qty: 30 tablet, Refills: 5           Current Discharge Medication List      CONTINUE these medications which have NOT CHANGED    Details hydrALAZINE (APRESOLINE) 50 MG tablet Take 1 tablet by mouth 3 times daily  Qty: 90 tablet, Refills: 0      tiZANidine (ZANAFLEX) 2 MG tablet Take 2 mg by mouth every 8 hours as needed (muscle spasms)      lidocaine (LIDODERM) 5 % Place 1 patch onto the skin daily 12 hours on, 12 hours off.      ondansetron (ZOFRAN) 4 MG tablet Take 1 tablet by mouth every 8 hours as needed for Nausea or Vomiting  Qty: 20 tablet, Refills: 0      aspirin 81 MG chewable tablet Take 1 tablet by mouth daily  Qty: 30 tablet, Refills: 3      Lancets MISC 1 each by Does not apply route daily  Qty: 100 each, Refills: 3      Insulin Pen Needle 32G X 5 MM MISC 1 each by Does not apply route daily  Qty: 100 each, Refills: 3      glucose monitoring kit (FREESTYLE) monitoring kit 1 each by Does not apply route once for 1 dose. Qty: 1 kit, Refills: 0           Current Discharge Medication List      STOP taking these medications       ibuprofen (ADVIL;MOTRIN) 600 MG tablet Comments:   Reason for Stopping:         metFORMIN (GLUCOPHAGE) 1000 MG tablet Comments:   Reason for Stopping:         lisinopril (PRINIVIL;ZESTRIL) 2.5 MG tablet Comments:   Reason for Stopping:               Discharge ROS:  A complete review of systems was asked and negative . Discharge Exam:  Estimated body mass index is 40.58 kg/m² as calculated from the following:    Height as of this encounter: 5' 9\" (1.753 m). Weight as of this encounter: 274 lb 12.8 oz (124.6 kg). BP (!) 146/83   Pulse 86   Temp 98.8 °F (37.1 °C) (Oral)   Resp 18   Ht 5' 9\" (1.753 m)   Wt 274 lb 12.8 oz (124.6 kg)   SpO2 95%   BMI 40.58 kg/m²   General appearance:  NAD  Heart[de-identified] Normal s1/s2, RRR, no murmurs, gallops, or rubs. No leg edema  Lungs:  Clear to auscultation, bilaterally without Rales/Wheezes/Rhonchi.   Abdomen: Soft, non-tender, non-distended, bowel sounds present  Musculoskeletal:   no cyanosis, no edema  Neurologic:  Cranial nerves: II-XII intact, grossly 27 - 31 PG    MCHC 34.1 32.0 - 36.0 %    RDW 12.3 11.7 - 14.9 %    Platelets 219 925 - 090 K/CU MM    MPV 10.9 7.5 - 11.1 FL    Differential Type AUTOMATED DIFFERENTIAL     Segs Relative 84.7 (H) 36 - 66 %    Lymphocytes % 6.2 (L) 24 - 44 %    Monocytes % 7.6 (H) 0 - 4 %    Eosinophils % 0.2 0 - 3 %    Basophils % 0.4 0 - 1 %    Segs Absolute 9.7 K/CU MM    Lymphocytes Absolute 0.7 K/CU MM    Monocytes Absolute 0.9 K/CU MM    Eosinophils Absolute 0.0 K/CU MM    Basophils Absolute 0.0 K/CU MM    Nucleated RBC % 0.0 %    Total Nucleated RBC 0.0 K/CU MM    Total Immature Neutrophil 0.10 K/CU MM    Immature Neutrophil % 0.9 (H) 0 - 0.43 %   Comprehensive Metabolic Panel w/ Reflex to MG   Result Value Ref Range    Sodium 125 (L) 135 - 145 MMOL/L    Potassium 4.3 3.5 - 5.1 MMOL/L    Chloride 93 (L) 99 - 110 mMol/L    CO2 24 21 - 32 MMOL/L    BUN 30 (H) 6 - 23 MG/DL    CREATININE 1.9 (H) 0.9 - 1.3 MG/DL    Glucose 301 (H) 70 - 99 MG/DL    Calcium 8.0 (L) 8.3 - 10.6 MG/DL    Albumin 2.2 (L) 3.4 - 5.0 GM/DL    Total Protein 6.1 (L) 6.4 - 8.2 GM/DL    Total Bilirubin 0.8 0.0 - 1.0 MG/DL    ALT 15 10 - 40 U/L    AST 20 15 - 37 IU/L    Alkaline Phosphatase 76 40 - 128 IU/L    GFR Non- 39 (L) >60 mL/min/1.73m2    GFR  47 (L) >60 mL/min/1.73m2    Anion Gap 8 4 - 16   Lactic Acid, Plasma   Result Value Ref Range    Lactate 1.2 0.4 - 2.0 mMOL/L   Hemoglobin A1c   Result Value Ref Range    Hemoglobin A1C 10.5 (H) 4.2 - 6.3 %    eAG 255 mg/dL   Basic Metabolic Panel w/ Reflex to MG   Result Value Ref Range    Sodium 133 (L) 135 - 145 MMOL/L    Potassium 3.8 3.5 - 5.1 MMOL/L    Chloride 96 (L) 99 - 110 mMol/L    CO2 24 21 - 32 MMOL/L    Anion Gap 13 4 - 16    BUN 41 (H) 6 - 23 MG/DL    CREATININE 2.5 (H) 0.9 - 1.3 MG/DL    Glucose 279 (H) 70 - 99 MG/DL    Calcium 7.5 (L) 8.3 - 10.6 MG/DL    GFR Non-African American 28 (L) >60 mL/min/1.73m2    GFR  34 (L) >60 mL/min/1.73m2   CBC auto differential   Result Value Ref Range    WBC 10.8 (H) 4.0 - 10.5 K/CU MM    RBC 3.48 (L) 4.6 - 6.2 M/CU MM    Hemoglobin 10.8 (L) 13.5 - 18.0 GM/DL    Hematocrit 31.3 (L) 42 - 52 %    MCV 89.9 78 - 100 FL    MCH 31.0 27 - 31 PG    MCHC 34.5 32.0 - 36.0 %    RDW 12.7 11.7 - 14.9 %    Platelets 411 495 - 963 K/CU MM    MPV 10.8 7.5 - 11.1 FL    Differential Type AUTOMATED DIFFERENTIAL     Segs Relative 78.8 (H) 36 - 66 %    Lymphocytes % 7.5 (L) 24 - 44 %    Monocytes % 10.2 (H) 0 - 4 %    Eosinophils % 1.8 0 - 3 %    Basophils % 0.5 0 - 1 %    Segs Absolute 8.5 K/CU MM    Lymphocytes Absolute 0.8 K/CU MM    Monocytes Absolute 1.1 K/CU MM    Eosinophils Absolute 0.2 K/CU MM    Basophils Absolute 0.1 K/CU MM    Nucleated RBC % 0.0 %    Total Nucleated RBC 0.0 K/CU MM    Total Immature Neutrophil 0.13 K/CU MM    Immature Neutrophil % 1.2 (H) 0 - 0.43 %   CBC   Result Value Ref Range    WBC 7.2 4.0 - 10.5 K/CU MM    RBC 3.36 (L) 4.6 - 6.2 M/CU MM    Hemoglobin 10.1 (L) 13.5 - 18.0 GM/DL    Hematocrit 30.1 (L) 42 - 52 %    MCV 89.6 78 - 100 FL    MCH 30.1 27 - 31 PG    MCHC 33.6 32.0 - 36.0 %    RDW 12.3 11.7 - 14.9 %    Platelets 581 158 - 212 K/CU MM    MPV 10.4 7.5 - 11.1 FL   Creatinine, Serum   Result Value Ref Range    CREATININE 2.7 (H) 0.9 - 1.3 MG/DL    GFR Non- 26 (L) >60 mL/min/1.73m2    GFR  31 (L) >60 mL/min/1.73m2   Vancomycin, random   Result Value Ref Range    Vancomycin Rm 10.6 UG/ML    DOSE AMOUNT DOSE AMT.  GIVEN - 1500     DOSE TIME DOSE TIME GIVEN - 3/24    CBC   Result Value Ref Range    WBC 7.6 4.0 - 10.5 K/CU MM    RBC 3.29 (L) 4.6 - 6.2 M/CU MM    Hemoglobin 10.1 (L) 13.5 - 18.0 GM/DL    Hematocrit 30.2 (L) 42 - 52 %    MCV 91.8 78 - 100 FL    MCH 30.7 27 - 31 PG    MCHC 33.4 32.0 - 36.0 %    RDW 12.2 11.7 - 14.9 %    Platelets 702 794 - 362 K/CU MM    MPV 9.9 7.5 - 11.1 FL   Creatinine, Serum   Result Value Ref Range    CREATININE 2.2 (H) 0.9 - 1.3 MG/DL    GFR Non- 33 (L) >60 mL/min/1.73m2    GFR  39 (L) >60 mL/min/1.73m2   Vancomycin, trough   Result Value Ref Range    Vancomycin Tr 13.6 10 - 20 UG/ML    DOSE AMOUNT DOSE AMT.  GIVEN - 1500mg     DOSE TIME DOSE TIME GIVEN - 3/25 @12:00    Urinalysis   Result Value Ref Range    Color, UA YELLOW YELLOW    Clarity, UA CLEAR CLEAR    Glucose, Urine 50 (A) NEGATIVE MG/DL    Bilirubin Urine NEGATIVE NEGATIVE MG/DL    Ketones, Urine NEGATIVE NEGATIVE MG/DL    Specific Gravity, UA 1.012 1.001 - 1.035    Blood, Urine NEGATIVE NEGATIVE    pH, Urine 5.0 5.0 - 8.0    Protein, UA >500 (HH) NEGATIVE MG/DL    Urobilinogen, Urine NEGATIVE 0.2 - 1.0 MG/DL    Nitrite Urine, Quantitative NEGATIVE NEGATIVE    Leukocyte Esterase, Urine NEGATIVE NEGATIVE    RBC, UA NONE SEEN 0 - 3 /HPF    WBC, UA NONE SEEN 0 - 2 /HPF    Bacteria, UA NEGATIVE NEGATIVE /HPF    Mucus, UA RARE (A) NEGATIVE HPF    Trichomonas, UA NONE SEEN NONE SEEN /HPF    Amorphous, UA RARE /HPF   Sodium, urine, random   Result Value Ref Range    Sodium, Ur 34 (L) 35 - 167 MMOL/L   Protein / creatinine ratio, urine   Result Value Ref Range    Urine Total Protein 100.0 (H) <12 MG/DL    Creatinine, Ur 1.1 (L) 39 - 259 MG/DL    Prot/Creat Ratio, Ur 90.9 (H) <0.2   CBC Auto Differential   Result Value Ref Range    WBC 8.3 4.0 - 10.5 K/CU MM    RBC 3.14 (L) 4.6 - 6.2 M/CU MM    Hemoglobin 9.7 (L) 13.5 - 18.0 GM/DL    Hematocrit 28.8 (L) 42 - 52 %    MCV 91.7 78 - 100 FL    MCH 30.9 27 - 31 PG    MCHC 33.7 32.0 - 36.0 %    RDW 12.1 11.7 - 14.9 %    Platelets 580 652 - 794 K/CU MM    MPV 9.8 7.5 - 11.1 FL    Myelocyte Percent 1 (H) 0.0 %    Bands Relative 11 5 - 11 %    Segs Relative 60.0 36 - 66 %    Eosinophils % 5.0 (H) 0 - 3 %    Basophils % 1.0 0 - 1 %    Lymphocytes % 15.0 (L) 24 - 44 %    Monocytes % 7.0 (H) 0 - 4 %    Myelocytes Absolute 0.08 K/CU MM    Bands Absolute 0.91 K/CU MM    Segs Absolute 5.0 K/CU MM    Eosinophils Absolute 0.4 K/CU MM Basophils Absolute 0.1 K/CU MM    Lymphocytes Absolute 1.2 K/CU MM    Monocytes Absolute 0.6 K/CU MM    Differential Type MANUAL DIFFERENTIAL    Phosphorus   Result Value Ref Range    Phosphorus 3.9 2.5 - 4.9 MG/DL   Vancomycin, trough   Result Value Ref Range    Vancomycin Tr 14.0 10 - 20 UG/ML    DOSE AMOUNT DOSE AMT.  GIVEN - 1500mg     DOSE TIME DOSE TIME GIVEN - 3/26 @13:00    Comprehensive Metabolic Panel w/ Reflex to MG   Result Value Ref Range    Sodium 134 (L) 135 - 145 MMOL/L    Potassium 4.4 3.5 - 5.1 MMOL/L    Chloride 105 99 - 110 mMol/L    CO2 22 21 - 32 MMOL/L    BUN 39 (H) 6 - 23 MG/DL    CREATININE 1.8 (H) 0.9 - 1.3 MG/DL    Glucose 248 (H) 70 - 99 MG/DL    Calcium 7.6 (L) 8.3 - 10.6 MG/DL    Albumin 2.4 (L) 3.4 - 5.0 GM/DL    Total Protein 5.2 (L) 6.4 - 8.2 GM/DL    Total Bilirubin 0.2 0.0 - 1.0 MG/DL    ALT 18 10 - 40 U/L    AST 16 15 - 37 IU/L    Alkaline Phosphatase 66 40 - 128 IU/L    GFR Non- 41 (L) >60 mL/min/1.73m2    GFR  50 (L) >60 mL/min/1.73m2    Anion Gap 7 4 - 16   Protein Electrophoresis, Urine   Result Value Ref Range    Albumin, U 48 %    Alpha-1-Globulin, U 12 %    Alpha-2-Globulin, U 10 %    Beta Globulin, U 14 %    Gamma Globulin, U 16 %    Urine Total Protein 355.7 MG/DL   Protein, 24 Hr Urine   Result Value Ref Range    Protein, 24H Urine 42671 (H) <150 MG/24 HR   Comprehensive Metabolic Panel w/ Reflex to MG   Result Value Ref Range    Sodium 135 135 - 145 MMOL/L    Potassium 4.4 3.5 - 5.1 MMOL/L    Chloride 103 99 - 110 mMol/L    CO2 22 21 - 32 MMOL/L    BUN 35 (H) 6 - 23 MG/DL    CREATININE 1.8 (H) 0.9 - 1.3 MG/DL    Glucose 224 (H) 70 - 99 MG/DL    Calcium 7.9 (L) 8.3 - 10.6 MG/DL    Albumin 2.4 (L) 3.4 - 5.0 GM/DL    Total Protein 5.4 (L) 6.4 - 8.2 GM/DL    Total Bilirubin 0.2 0.0 - 1.0 MG/DL    ALT 16 10 - 40 U/L    AST 13 (L) 15 - 37 IU/L    Alkaline Phosphatase 66 40 - 128 IU/L    GFR Non- 41 (L) >60 mL/min/1.73m2    GFR African American 50 (L) >60 mL/min/1.73m2    Anion Gap 10 4 - 16   Phosphorus   Result Value Ref Range    Phosphorus 3.9 2.5 - 4.9 MG/DL   Electrophoresis Protein, Serum without Reflex to Immunofixation   Result Value Ref Range    Total Protein 5.4 (L) 6.4 - 8.2 GM/DL    Albumin Electrophoresis 1.9 (L) 3.2 - 5.6 GM/DL    Alpha-1-Globulin 0.4 0.1 - 0.4 GM/DL    Alpha-2-Globulin 1.4 (H) 0.4 - 1.2 GM/DL    Beta Globulin 0.9 0.5 - 1.3 GM/DL    Gamma Globulin 0.9 0.5 - 1.6 GM/DL   Hepatitis B surface antigen   Result Value Ref Range    Hepatitis B Surface Ag NON REACTIVE NON REACTIVE   Hepatitis C AB No Charge   Result Value Ref Range    Hepatitis C Ab NON REACTIVE NON REACTIVE   Renal function panel   Result Value Ref Range    Sodium 138 135 - 145 MMOL/L    Potassium 4.8 3.5 - 5.1 MMOL/L    Chloride 107 99 - 110 mMol/L    CO2 23 21 - 32 MMOL/L    Anion Gap 8 4 - 16    BUN 33 (H) 6 - 23 MG/DL    CREATININE 1.7 (H) 0.9 - 1.3 MG/DL    Glucose 244 (H) 70 - 99 MG/DL    Calcium 7.8 (L) 8.3 - 10.6 MG/DL    GFR Non- 44 (L) >60 mL/min/1.73m2    GFR  53 (L) >60 mL/min/1.73m2    Albumin 2.2 (L) 3.4 - 5.0 GM/DL    Phosphorus 4.2 2.5 - 4.9 MG/DL   Period And Volume   Result Value Ref Range    Time (Hours) 24 HRS    Volume, (UVOL) 3,900 MLS   POCT Glucose   Result Value Ref Range    POC Glucose 395 (H) 70 - 99 MG/DL   POCT Glucose   Result Value Ref Range    POC Glucose 303 (H) 70 - 99 MG/DL   POCT Glucose   Result Value Ref Range    POC Glucose 248 (H) 70 - 99 MG/DL   POCT Glucose   Result Value Ref Range    POC Glucose 239 (H) 70 - 99 MG/DL   POCT Glucose   Result Value Ref Range    POC Glucose 255 (H) 70 - 99 MG/DL   POCT Glucose   Result Value Ref Range    POC Glucose 237 (H) 70 - 99 MG/DL   POCT Glucose   Result Value Ref Range    POC Glucose 179 (H) 70 - 99 MG/DL   POCT Glucose   Result Value Ref Range    POC Glucose 173 (H) 70 - 99 MG/DL   POCT Glucose   Result Value Ref Range    POC Glucose 244 (H) 70 - 99 MG/DL   POCT Glucose   Result Value Ref Range    POC Glucose 231 (H) 70 - 99 MG/DL   POCT Glucose   Result Value Ref Range    POC Glucose 185 (H) 70 - 99 MG/DL   POCT Glucose   Result Value Ref Range    POC Glucose 299 (H) 70 - 99 MG/DL   POCT Glucose   Result Value Ref Range    POC Glucose 257 (H) 70 - 99 MG/DL   POCT Glucose   Result Value Ref Range    POC Glucose 236 (H) 70 - 99 MG/DL   POCT Glucose   Result Value Ref Range    POC Glucose 159 (H) 70 - 99 MG/DL   POCT Glucose   Result Value Ref Range    POC Glucose 129 (H) 70 - 99 MG/DL   POCT Glucose   Result Value Ref Range    POC Glucose 245 (H) 70 - 99 MG/DL   POCT Glucose   Result Value Ref Range    POC Glucose 231 (H) 70 - 99 MG/DL   POCT Glucose   Result Value Ref Range    POC Glucose 269 (H) 70 - 99 MG/DL   POCT Glucose   Result Value Ref Range    POC Glucose 245 (H) 70 - 99 MG/DL   POCT Glucose   Result Value Ref Range    POC Glucose 243 (H) 70 - 99 MG/DL   POCT Glucose   Result Value Ref Range    POC Glucose 239 (H) 70 - 99 MG/DL   POCT Glucose   Result Value Ref Range    POC Glucose 228 (H) 70 - 99 MG/DL   POCT Glucose   Result Value Ref Range    POC Glucose 229 (H) 70 - 99 MG/DL   POCT Glucose   Result Value Ref Range    POC Glucose 289 (H) 70 - 99 MG/DL   POCT Glucose   Result Value Ref Range    POC Glucose 292 (H) 70 - 99 MG/DL   POCT Glucose   Result Value Ref Range    POC Glucose 201 (H) 70 - 99 MG/DL   EKG 12 Lead   Result Value Ref Range    Ventricular Rate 105 BPM    Atrial Rate 105 BPM    P-R Interval 154 ms    QRS Duration 80 ms    Q-T Interval 326 ms    QTc Calculation (Bazett) 430 ms    P Axis 20 degrees    R Axis 19 degrees    T Axis 71 degrees    Diagnosis       Sinus tachycardia  Otherwise normal ECG  When compared with ECG of 21-MAR-2019 01:54,  No significant change was found  Confirmed by Kanu Jasso ([id number]) on 3/23/2021 5:32:00 PM           Chart review shows recent radiographs:  Xr Foot Left (min 3 Views)    Result Date: 3/25/2021  EXAMINATION: THREE XRAY VIEWS OF THE LEFT FOOT 3/24/2021 8:37 am COMPARISON: None HISTORY: ORDERING SYSTEM PROVIDED HISTORY: diabetic foot TECHNOLOGIST PROVIDED HISTORY: Reason for exam:->diabetic foot Reason for Exam: diabetic left foot / pain Acuity: Acute Type of Exam: Initial FINDINGS: Again demonstrated 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 There is a soft tissue ulceration along the plantar aspect of the forefoot at the level of the MTP joints. There is soft tissue swelling of the forefoot suggesting cellulitis. No focal osseous erosions or cortical destructive abnormality to suggest osteomyelitis. Ulceration of the plantar aspect of the forefoot with associated cellulitis. No radiographic evidence of osteomyelitis. Us Retroperitoneal Limited    Result Date: 3/26/2021  EXAMINATION: ULTRASOUND OF THE KIDNEYS 3/26/2021 5:19 am COMPARISON: 01/10/2019 HISTORY: ORDERING SYSTEM PROVIDED HISTORY: ARF TECHNOLOGIST PROVIDED HISTORY: Reason for exam:->ARF Reason for Exam: ARF Acuity: Acute Type of Exam: Initial FINDINGS: The right kidney measures 12.8 cm in length and the left kidney measures 13.2 cm in length. Kidneys demonstrate normal cortical echogenicity. No hydronephrosis or intrarenal stones. No focal lesions. Unremarkable ultrasound of the kidneys. EKG     Rhythm: normal sinus       Immunization History   Administered Date(s) Administered    Influenza 10/15/2013    Influenza, Quadv, 6 mo and older, IM, PF (Flulaval, Fluarix) 09/19/2018    Pneumococcal Polysaccharide ([id number]) 05/02/2013         The patient was seen and examined on day of discharge and this discharge summary is in conjunction with any daily progress note from day of discharge. Time Spent on discharge is   >35  min  in the examination, evaluation, counseling and review of medications and discharge plan.             Crescencio Vasquez MD   3/29/2021

## 2021-03-29 NOTE — CONSULTS
Consult completed. #20ga Arrow Endurance Extended Dwell MidLine catheter initiated to LME Basilic Vein using sterile, UltraSound-guided technique. Positioning verified via UltraSound visualization of catheter within vessel lumen; site returns blood briskly and flushes without resistance/abnormalities. Primary RN, notified and no other c/o or needs noted/reported at this time.

## 2021-03-30 LAB
ALBUMIN ELP: 1.9 GM/DL (ref 3.2–5.6)
ALBUMIN, U: 48 %
ALPHA-1-GLOBULIN, U: 12 %
ALPHA-1-GLOBULIN: 0.4 GM/DL (ref 0.1–0.4)
ALPHA-2-GLOBULIN, U: 10 %
ALPHA-2-GLOBULIN: 1.4 GM/DL (ref 0.4–1.2)
ANTI-NUCLEAR ANTIBODY (ANA): NORMAL
BETA GLOBULIN, U: 14 %
BETA GLOBULIN: 0.9 GM/DL (ref 0.5–1.3)
COMPLEMENT C3: 152 MG/DL (ref 88–201)
COMPLEMENT C4: 25 MG/DL (ref 10–40)
GAMMA GLOBULIN, U: 16 %
GAMMA GLOBULIN: 0.9 GM/DL (ref 0.5–1.6)
KAPPA QUANT FREE LIGHT CHAINS: 117.64 MG/L (ref 3.3–19.4)
KAPPA/LAMBDA FREE LIGHT CHAIN RATIO: 1.12 (ref 0.26–1.65)
LAMBDA FREE LIGHT CHAINS QNT: 105.47 MG/L (ref 5.71–26.3)
MYELOPEROXIDASE AB: 1 AU/ML (ref 0–19)
SERINE PR3 (ANCA): 7 AU/ML (ref 0–19)
SPEP INTERPRETATION: ABNORMAL
SPEP INTERPRETATION: NORMAL
TOTAL PROTEIN: 5.4 GM/DL (ref 6.4–8.2)
URINE TOTAL PROTEIN: 355.7 MG/DL

## 2021-04-03 ENCOUNTER — APPOINTMENT (OUTPATIENT)
Dept: GENERAL RADIOLOGY | Age: 46
End: 2021-04-03
Payer: MEDICARE

## 2021-04-03 ENCOUNTER — HOSPITAL ENCOUNTER (OUTPATIENT)
Age: 46
Setting detail: OBSERVATION
Discharge: HOME HEALTH | End: 2021-04-04
Attending: EMERGENCY MEDICINE | Admitting: FAMILY MEDICINE
Payer: MEDICARE

## 2021-04-03 ENCOUNTER — APPOINTMENT (OUTPATIENT)
Dept: ULTRASOUND IMAGING | Age: 46
End: 2021-04-03
Payer: MEDICARE

## 2021-04-03 DIAGNOSIS — Z78.9 POOR INTRAVENOUS ACCESS: ICD-10-CM

## 2021-04-03 DIAGNOSIS — L03.116 LEFT LEG CELLULITIS: Primary | ICD-10-CM

## 2021-04-03 DIAGNOSIS — E11.8 DIABETIC FOOT (HCC): ICD-10-CM

## 2021-04-03 PROBLEM — L03.114 CELLULITIS OF LEFT ARM: Status: ACTIVE | Noted: 2021-04-03

## 2021-04-03 LAB
ANION GAP SERPL CALCULATED.3IONS-SCNC: 10 MMOL/L (ref 4–16)
BASOPHILS ABSOLUTE: 0.1 K/CU MM
BASOPHILS RELATIVE PERCENT: 0.8 % (ref 0–1)
BUN BLDV-MCNC: 39 MG/DL (ref 6–23)
CALCIUM SERPL-MCNC: 8.5 MG/DL (ref 8.3–10.6)
CHLORIDE BLD-SCNC: 107 MMOL/L (ref 99–110)
CO2: 20 MMOL/L (ref 21–32)
CREAT SERPL-MCNC: 1.9 MG/DL (ref 0.9–1.3)
DIFFERENTIAL TYPE: ABNORMAL
EOSINOPHILS ABSOLUTE: 0.1 K/CU MM
EOSINOPHILS RELATIVE PERCENT: 0.7 % (ref 0–3)
GFR AFRICAN AMERICAN: 47 ML/MIN/1.73M2
GFR NON-AFRICAN AMERICAN: 39 ML/MIN/1.73M2
GLUCOSE BLD-MCNC: 196 MG/DL (ref 70–99)
HCT VFR BLD CALC: 30.1 % (ref 42–52)
HEMOGLOBIN: 9.6 GM/DL (ref 13.5–18)
IMMATURE NEUTROPHIL %: 1.1 % (ref 0–0.43)
LYMPHOCYTES ABSOLUTE: 1.2 K/CU MM
LYMPHOCYTES RELATIVE PERCENT: 17.1 % (ref 24–44)
MCH RBC QN AUTO: 30.1 PG (ref 27–31)
MCHC RBC AUTO-ENTMCNC: 31.9 % (ref 32–36)
MCV RBC AUTO: 94.4 FL (ref 78–100)
MONOCYTES ABSOLUTE: 0.6 K/CU MM
MONOCYTES RELATIVE PERCENT: 8.7 % (ref 0–4)
NUCLEATED RBC %: 0 %
PDW BLD-RTO: 12.4 % (ref 11.7–14.9)
PLATELET # BLD: 301 K/CU MM (ref 140–440)
PMV BLD AUTO: 10 FL (ref 7.5–11.1)
POTASSIUM SERPL-SCNC: 5 MMOL/L (ref 3.5–5.1)
RBC # BLD: 3.19 M/CU MM (ref 4.6–6.2)
SEGMENTED NEUTROPHILS ABSOLUTE COUNT: 5.1 K/CU MM
SEGMENTED NEUTROPHILS RELATIVE PERCENT: 71.6 % (ref 36–66)
SODIUM BLD-SCNC: 137 MMOL/L (ref 135–145)
TOTAL IMMATURE NEUTOROPHIL: 0.08 K/CU MM
TOTAL NUCLEATED RBC: 0 K/CU MM
WBC # BLD: 7.1 K/CU MM (ref 4–10.5)

## 2021-04-03 PROCEDURE — 93971 EXTREMITY STUDY: CPT

## 2021-04-03 PROCEDURE — 71045 X-RAY EXAM CHEST 1 VIEW: CPT

## 2021-04-03 PROCEDURE — 85025 COMPLETE CBC W/AUTO DIFF WBC: CPT

## 2021-04-03 PROCEDURE — 73060 X-RAY EXAM OF HUMERUS: CPT

## 2021-04-03 PROCEDURE — 36415 COLL VENOUS BLD VENIPUNCTURE: CPT

## 2021-04-03 PROCEDURE — 80048 BASIC METABOLIC PNL TOTAL CA: CPT

## 2021-04-03 PROCEDURE — 99285 EMERGENCY DEPT VISIT HI MDM: CPT

## 2021-04-03 PROCEDURE — G0378 HOSPITAL OBSERVATION PER HR: HCPCS

## 2021-04-04 VITALS
WEIGHT: 260.3 LBS | DIASTOLIC BLOOD PRESSURE: 99 MMHG | BODY MASS INDEX: 38.55 KG/M2 | TEMPERATURE: 98.1 F | HEIGHT: 69 IN | SYSTOLIC BLOOD PRESSURE: 177 MMHG | OXYGEN SATURATION: 98 % | HEART RATE: 90 BPM | RESPIRATION RATE: 18 BRPM

## 2021-04-04 LAB
BASOPHILS ABSOLUTE: 0.1 K/CU MM
BASOPHILS RELATIVE PERCENT: 0.7 % (ref 0–1)
DIFFERENTIAL TYPE: ABNORMAL
EOSINOPHILS ABSOLUTE: 0.1 K/CU MM
EOSINOPHILS RELATIVE PERCENT: 0.7 % (ref 0–3)
GLUCOSE BLD-MCNC: 169 MG/DL (ref 70–99)
GLUCOSE BLD-MCNC: 193 MG/DL (ref 70–99)
GLUCOSE BLD-MCNC: 232 MG/DL (ref 70–99)
HCT VFR BLD CALC: 27.1 % (ref 42–52)
HEMOGLOBIN: 8.8 GM/DL (ref 13.5–18)
IMMATURE NEUTROPHIL %: 1 % (ref 0–0.43)
LYMPHOCYTES ABSOLUTE: 1.6 K/CU MM
LYMPHOCYTES RELATIVE PERCENT: 18.7 % (ref 24–44)
MCH RBC QN AUTO: 30 PG (ref 27–31)
MCHC RBC AUTO-ENTMCNC: 32.5 % (ref 32–36)
MCV RBC AUTO: 92.5 FL (ref 78–100)
MONOCYTES ABSOLUTE: 0.7 K/CU MM
MONOCYTES RELATIVE PERCENT: 8.6 % (ref 0–4)
NUCLEATED RBC %: 0 %
PDW BLD-RTO: 12.6 % (ref 11.7–14.9)
PLATELET # BLD: 255 K/CU MM (ref 140–440)
PMV BLD AUTO: 9.4 FL (ref 7.5–11.1)
RBC # BLD: 2.93 M/CU MM (ref 4.6–6.2)
SEGMENTED NEUTROPHILS ABSOLUTE COUNT: 5.9 K/CU MM
SEGMENTED NEUTROPHILS RELATIVE PERCENT: 70.3 % (ref 36–66)
TOTAL IMMATURE NEUTOROPHIL: 0.08 K/CU MM
TOTAL NUCLEATED RBC: 0 K/CU MM
WBC # BLD: 8.4 K/CU MM (ref 4–10.5)

## 2021-04-04 PROCEDURE — 6370000000 HC RX 637 (ALT 250 FOR IP): Performed by: GENERAL PRACTICE

## 2021-04-04 PROCEDURE — G0378 HOSPITAL OBSERVATION PER HR: HCPCS

## 2021-04-04 PROCEDURE — 96366 THER/PROPH/DIAG IV INF ADDON: CPT

## 2021-04-04 PROCEDURE — 36415 COLL VENOUS BLD VENIPUNCTURE: CPT

## 2021-04-04 PROCEDURE — 94761 N-INVAS EAR/PLS OXIMETRY MLT: CPT

## 2021-04-04 PROCEDURE — 36410 VNPNXR 3YR/> PHY/QHP DX/THER: CPT

## 2021-04-04 PROCEDURE — 76937 US GUIDE VASCULAR ACCESS: CPT

## 2021-04-04 PROCEDURE — 85025 COMPLETE CBC W/AUTO DIFF WBC: CPT

## 2021-04-04 PROCEDURE — C1751 CATH, INF, PER/CENT/MIDLINE: HCPCS

## 2021-04-04 PROCEDURE — 82962 GLUCOSE BLOOD TEST: CPT

## 2021-04-04 PROCEDURE — 2580000003 HC RX 258: Performed by: GENERAL PRACTICE

## 2021-04-04 PROCEDURE — 96365 THER/PROPH/DIAG IV INF INIT: CPT

## 2021-04-04 PROCEDURE — 96372 THER/PROPH/DIAG INJ SC/IM: CPT

## 2021-04-04 PROCEDURE — 6360000002 HC RX W HCPCS: Performed by: GENERAL PRACTICE

## 2021-04-04 RX ORDER — SODIUM CHLORIDE 0.9 % (FLUSH) 0.9 %
10 SYRINGE (ML) INJECTION PRN
Status: DISCONTINUED | OUTPATIENT
Start: 2021-04-04 | End: 2021-04-04 | Stop reason: SDUPTHER

## 2021-04-04 RX ORDER — ONDANSETRON 2 MG/ML
4 INJECTION INTRAMUSCULAR; INTRAVENOUS EVERY 6 HOURS PRN
Status: DISCONTINUED | OUTPATIENT
Start: 2021-04-04 | End: 2021-04-04 | Stop reason: HOSPADM

## 2021-04-04 RX ORDER — POLYETHYLENE GLYCOL 3350 17 G/17G
17 POWDER, FOR SOLUTION ORAL DAILY PRN
Status: DISCONTINUED | OUTPATIENT
Start: 2021-04-04 | End: 2021-04-04 | Stop reason: HOSPADM

## 2021-04-04 RX ORDER — ACETAMINOPHEN 650 MG/1
650 SUPPOSITORY RECTAL EVERY 6 HOURS PRN
Status: DISCONTINUED | OUTPATIENT
Start: 2021-04-04 | End: 2021-04-04 | Stop reason: HOSPADM

## 2021-04-04 RX ORDER — BLOOD-GLUCOSE METER
1 KIT MISCELLANEOUS ONCE
Status: DISCONTINUED | OUTPATIENT
Start: 2021-04-04 | End: 2021-04-04 | Stop reason: RX

## 2021-04-04 RX ORDER — OXYCODONE AND ACETAMINOPHEN 7.5; 325 MG/1; MG/1
1 TABLET ORAL EVERY 8 HOURS PRN
Status: DISCONTINUED | OUTPATIENT
Start: 2021-04-04 | End: 2021-04-04 | Stop reason: HOSPADM

## 2021-04-04 RX ORDER — PROMETHAZINE HYDROCHLORIDE 12.5 MG/1
12.5 TABLET ORAL EVERY 6 HOURS PRN
Status: DISCONTINUED | OUTPATIENT
Start: 2021-04-04 | End: 2021-04-04 | Stop reason: HOSPADM

## 2021-04-04 RX ORDER — INSULIN GLARGINE 100 [IU]/ML
40 INJECTION, SOLUTION SUBCUTANEOUS NIGHTLY
Status: DISCONTINUED | OUTPATIENT
Start: 2021-04-04 | End: 2021-04-04 | Stop reason: HOSPADM

## 2021-04-04 RX ORDER — OXYCODONE AND ACETAMINOPHEN 7.5; 325 MG/1; MG/1
1 TABLET ORAL EVERY 8 HOURS PRN
Qty: 10 TABLET | Refills: 0 | Status: SHIPPED | OUTPATIENT
Start: 2021-04-04 | End: 2021-04-04 | Stop reason: HOSPADM

## 2021-04-04 RX ORDER — SODIUM CHLORIDE 0.9 % (FLUSH) 0.9 %
10 SYRINGE (ML) INJECTION PRN
Status: DISCONTINUED | OUTPATIENT
Start: 2021-04-04 | End: 2021-04-04 | Stop reason: HOSPADM

## 2021-04-04 RX ORDER — HYDRALAZINE HYDROCHLORIDE 50 MG/1
50 TABLET, FILM COATED ORAL 3 TIMES DAILY
Status: DISCONTINUED | OUTPATIENT
Start: 2021-04-04 | End: 2021-04-04 | Stop reason: HOSPADM

## 2021-04-04 RX ORDER — TIZANIDINE 2 MG/1
2 TABLET ORAL EVERY 8 HOURS PRN
Status: DISCONTINUED | OUTPATIENT
Start: 2021-04-04 | End: 2021-04-04 | Stop reason: HOSPADM

## 2021-04-04 RX ORDER — ALOGLIPTIN 12.5 MG/1
25 TABLET, FILM COATED ORAL DAILY
Status: DISCONTINUED | OUTPATIENT
Start: 2021-04-04 | End: 2021-04-04 | Stop reason: HOSPADM

## 2021-04-04 RX ORDER — SODIUM CHLORIDE 0.9 % (FLUSH) 0.9 %
10 SYRINGE (ML) INJECTION EVERY 12 HOURS SCHEDULED
Status: DISCONTINUED | OUTPATIENT
Start: 2021-04-04 | End: 2021-04-04 | Stop reason: SDUPTHER

## 2021-04-04 RX ORDER — SODIUM CHLORIDE 9 MG/ML
25 INJECTION, SOLUTION INTRAVENOUS PRN
Status: DISCONTINUED | OUTPATIENT
Start: 2021-04-04 | End: 2021-04-04 | Stop reason: HOSPADM

## 2021-04-04 RX ORDER — LIDOCAINE 4 G/G
1 PATCH TOPICAL DAILY
Status: DISCONTINUED | OUTPATIENT
Start: 2021-04-04 | End: 2021-04-04 | Stop reason: HOSPADM

## 2021-04-04 RX ORDER — ASPIRIN 81 MG/1
81 TABLET ORAL DAILY
Qty: 30 TABLET | Refills: 3 | Status: ON HOLD | OUTPATIENT
Start: 2021-04-05 | End: 2022-08-25 | Stop reason: SDUPTHER

## 2021-04-04 RX ORDER — ASPIRIN 81 MG/1
81 TABLET ORAL DAILY
Status: DISCONTINUED | OUTPATIENT
Start: 2021-04-04 | End: 2021-04-04 | Stop reason: HOSPADM

## 2021-04-04 RX ORDER — LIDOCAINE HYDROCHLORIDE 10 MG/ML
5 INJECTION, SOLUTION EPIDURAL; INFILTRATION; INTRACAUDAL; PERINEURAL ONCE
Status: DISCONTINUED | OUTPATIENT
Start: 2021-04-04 | End: 2021-04-04 | Stop reason: HOSPADM

## 2021-04-04 RX ORDER — SODIUM CHLORIDE 0.9 % (FLUSH) 0.9 %
10 SYRINGE (ML) INJECTION EVERY 12 HOURS SCHEDULED
Status: DISCONTINUED | OUTPATIENT
Start: 2021-04-04 | End: 2021-04-04 | Stop reason: HOSPADM

## 2021-04-04 RX ORDER — ACETAMINOPHEN 325 MG/1
650 TABLET ORAL EVERY 6 HOURS PRN
Status: DISCONTINUED | OUTPATIENT
Start: 2021-04-04 | End: 2021-04-04 | Stop reason: HOSPADM

## 2021-04-04 RX ADMIN — ALOGLIPTIN 25 MG: 12.5 TABLET, FILM COATED ORAL at 09:38

## 2021-04-04 RX ADMIN — INSULIN LISPRO 25 UNITS: 100 INJECTION, SOLUTION INTRAVENOUS; SUBCUTANEOUS at 13:49

## 2021-04-04 RX ADMIN — HYDRALAZINE HYDROCHLORIDE 50 MG: 50 TABLET ORAL at 13:49

## 2021-04-04 RX ADMIN — OXYCODONE AND ACETAMINOPHEN 1 TABLET: 7.5; 325 TABLET ORAL at 14:02

## 2021-04-04 RX ADMIN — HYDRALAZINE HYDROCHLORIDE 50 MG: 50 TABLET ORAL at 09:42

## 2021-04-04 RX ADMIN — INSULIN LISPRO 25 UNITS: 100 INJECTION, SOLUTION INTRAVENOUS; SUBCUTANEOUS at 18:11

## 2021-04-04 RX ADMIN — HYDRALAZINE HYDROCHLORIDE 50 MG: 50 TABLET ORAL at 19:53

## 2021-04-04 RX ADMIN — INSULIN LISPRO 25 UNITS: 100 INJECTION, SOLUTION INTRAVENOUS; SUBCUTANEOUS at 09:39

## 2021-04-04 RX ADMIN — VANCOMYCIN HYDROCHLORIDE 1500 MG: 5 INJECTION, POWDER, LYOPHILIZED, FOR SOLUTION INTRAVENOUS at 04:11

## 2021-04-04 RX ADMIN — ENOXAPARIN SODIUM 40 MG: 40 INJECTION SUBCUTANEOUS at 09:41

## 2021-04-04 RX ADMIN — OXYCODONE AND ACETAMINOPHEN 1 TABLET: 7.5; 325 TABLET ORAL at 04:18

## 2021-04-04 RX ADMIN — ASPIRIN 81 MG: 81 TABLET, COATED ORAL at 09:42

## 2021-04-04 RX ADMIN — SODIUM CHLORIDE, PRESERVATIVE FREE 10 ML: 5 INJECTION INTRAVENOUS at 09:45

## 2021-04-04 ASSESSMENT — PAIN DESCRIPTION - PROGRESSION: CLINICAL_PROGRESSION: NOT CHANGED

## 2021-04-04 ASSESSMENT — PAIN DESCRIPTION - FREQUENCY: FREQUENCY: CONTINUOUS

## 2021-04-04 ASSESSMENT — PAIN - FUNCTIONAL ASSESSMENT: PAIN_FUNCTIONAL_ASSESSMENT: PREVENTS OR INTERFERES SOME ACTIVE ACTIVITIES AND ADLS

## 2021-04-04 ASSESSMENT — PAIN DESCRIPTION - ONSET: ONSET: ON-GOING

## 2021-04-04 NOTE — ED NOTES
Redressed midline with new sterile Tegaderm, cleaned and prepped with alcohol prep and chlorhexidine.        Amena Calderon RN  04/03/21 [id number]

## 2021-04-04 NOTE — PLAN OF CARE
Problem: Pain:  Goal: Pain level will decrease  Description: Pain level will decrease  4/4/2021 1225 by Kurtis Fernandez LPN  Outcome: Ongoing  4/4/2021 0805 by Vinnie Villar RN  Outcome: Ongoing  Goal: Control of acute pain  Description: Control of acute pain  4/4/2021 1225 by Kurtis Fernandez LPN  Outcome: Ongoing  4/4/2021 0805 by Vinnie Villar RN  Outcome: Ongoing  Goal: Control of chronic pain  Description: Control of chronic pain  4/4/2021 1225 by Kurtis Fernandez LPN  Outcome: Ongoing  4/4/2021 0805 by Vinnie Villar RN  Outcome: Ongoing

## 2021-04-04 NOTE — PLAN OF CARE
Problem: Pain:  Goal: Pain level will decrease  Description: Pain level will decrease  Outcome: Ongoing  Goal: Control of acute pain  Description: Control of acute pain  Outcome: Ongoing  Goal: Control of chronic pain  Description: Control of chronic pain  Outcome: Ongoing     Problem: Falls - Risk of:  Goal: Will remain free from falls  Description: Will remain free from falls  Outcome: Ongoing  Goal: Absence of physical injury  Description: Absence of physical injury  Outcome: Ongoing     Problem: Skin Integrity:  Goal: Skin integrity will stabilize  Description: Skin integrity will stabilize  Outcome: Ongoing

## 2021-04-04 NOTE — ED PROVIDER NOTES
or other lesions reported. No yellowing of the skin. Past Medical History:   Diagnosis Date    Abscess 2010    scrotal    Acute renal failure (ARF) ([address] 8/4/2019    Anxiety associated with depression     Anxiety associated with depression     Back pain 7/2/2012    Chest pain 5/1/2013    Diabetic nephropathy ([address]     Diabetic neuropathy ([address] 12/22/2011    Diabetic ulcer of left midfoot associated with type 2 diabetes mellitus, with fat layer exposed ([address] 7/18/2017    Diverticulosis     C scope + Dr. Hannah Valle DM (diabetes mellitus), type 2 ([address] 2002    DR. Turner podiatry    Hyperlipidemia LDL goal < 100 7/15/2013    Hypertension     Internal hemorrhoid     C scope + Dr. Jones Jackson fracture 1988    Panic attacks     Pericarditis 2003    Hospitalized with s/p heart cath normal    Peripheral autonomic neuropathy due to diabetes mellitus ([address]     Axonal EMG- NCS, March 2011    Sebaceous cyst 9/1/2011    URI (upper respiratory infection) 2/27/2012    WD-Wound, surgical, infected, initial encounter 12/8/2017    Wrist fracture 1986     Past Surgical History:   Procedure Laterality Date   [address] 2003, 2013    Normal (Dr. Saad Nails)    COLONOSCOPY  6/2/2011    Pandiverticulosis, Nonbleeding internal hemorrhoids, Repeat colonoscopy at age 48- Dr. Alex Storey    \"had reconstruction surgery on nose a year after the other nose surgery\"    OTHER SURGICAL HISTORY Right 05/22/2015    I & D right great toe with partial amputation    OTHER SURGICAL HISTORY  12/04/2017    inc and drainage of abcess    RI DEEP INCIS FOOT BONE INFECTN Left 9/22/2018    LEFT FOOT DEBRIDEMENT INCISION AND DRAINAGE TOP AND BOTTOM performed by Merly Bush DPM at [address] [place] [address] [phone]    sebaceous cyst removal times 4     TOE AMPUTATION      right great toe    TOE AMPUTATION Right 3/23/2019    TOE AMPUTATION RIGHT 5TH TOE performed by Phi Sellers BENNY Caba at [facility] TOE AMPUTATION Right 8/6/2019    TOE AMPUTATION RIGHT 4TH TOE performed by Jose C Yepez DPM at [facility] [address] ENDOSCOPY  06/2/2011    Mild gastritis with moderatley severe antritis, small hiatal hernia, Dr. Viktor Murrieta 1/12/2019    EGD BIOPSY performed by Crow Don MD at [facility] History   Problem Relation Age of Onset   [facility] Cancer Mother         ?site   [facility] Stroke Mother     Bleeding Prob Mother     Diabetes Father     Heart Disease Father     High Blood Pressure Father     Obesity Father     Kidney Disease Father     Diabetes Sister     High Blood Pressure Sister     Mental Illness Sister     Obesity Sister     High Blood Pressure Other     Mental Illness Other         bipolar    Other Son         cyst in ear canal    ADHD Daughter      Social History     Socioeconomic History    Marital status:      Spouse name: Not on file    Number of children: Not on file    Years of education: Not on file    Highest education level: Not on file   Occupational History    Not on file   Social Needs    Financial resource strain: Not on file    Food insecurity     Worry: Not on file     Inability: Not on file    Transportation needs     Medical: Not on file     Non-medical: Not on file   Tobacco Use    Smoking status: Former Smoker     Years: 20.00     Quit date: 11/18/2017     Years since quitting: 3.3    Smokeless tobacco: Never Used   Substance and Sexual Activity    Alcohol use: Yes     Comment: occ    Drug use: Yes     Frequency: 7.0 times per week     Types: Marijuana    Sexual activity: Yes     Partners: Female   Lifestyle    Physical activity     Days per week: Not on file     Minutes per session: Not on file    Stress: Not on file   Relationships    Social connections     Talks on phone: Not on file     Gets together: Not on file     Attends Quaker service: Not on file     Active member of club or organization: Not on file     Attends meetings of clubs or organizations: Not on file     Relationship status: Not on file    Intimate partner violence     Fear of current or ex partner: Not on file     Emotionally abused: Not on file     Physically abused: Not on file     Forced sexual activity: Not on file   Other Topics Concern    Not on file   Social History Narrative    Not on file     Current Facility-Administered Medications   Medication Dose Route Frequency Provider Last Rate Last Admin    aspirin EC tablet 81 mg  81 mg Oral Daily Fredy Mckinley MD   81 mg at 04/04/21 0942    hydrALAZINE (APRESOLINE) tablet 50 mg  50 mg Oral TID Fredy Mckinley MD   50 mg at 04/04/21 1349    insulin glargine (LANTUS) injection vial 40 Units  40 Units Subcutaneous Nightly Fredy Mckinley MD        insulin lispro (HUMALOG) injection vial 25 Units  25 Units Subcutaneous TID WC Fredy Mckinley MD   25 Units at 04/04/21 1349    lidocaine 4 % external patch 1 patch  1 patch Transdermal Daily Fredy Mckinley MD   1 patch at 04/04/21 0942    alogliptin (NESINA) tablet 25 mg  25 mg Oral Daily Fredy Mckinley MD   25 mg at 04/04/21 0938    oxyCODONE-acetaminophen (PERCOCET) 7.5-325 MG per tablet 1 tablet  1 tablet Oral Q8H PRN Fredy Mckinley MD   1 tablet at 04/04/21 1402    tiZANidine (ZANAFLEX) tablet 2 mg  2 mg Oral Q8H PRN Fredy Mckinley MD        0.9 % sodium chloride infusion  25 mL Intravenous PRN Fredy Mckinley MD        enoxaparin (LOVENOX) injection 40 mg  40 mg Subcutaneous Daily Fredy Mckinley MD   40 mg at 04/04/21 0941    promethazine (PHENERGAN) tablet 12.5 mg  12.5 mg Oral Q6H PRN Fredy Mckinley MD        Or    ondansetron (ZOFRAN) injection 4 mg  4 mg Intravenous Q6H PRN Fredy Mckinley MD        polyethylene glycol (GLYCOLAX) packet 17 g  17 g Oral Daily PRN Fredy Todd MD        acetaminophen (TYLENOL) tablet 650 mg  650 mg Oral Q6H PRN Fredy Todd MD        Or   Fabby Perez acetaminophen (TYLENOL) suppository 650 mg  650 mg Rectal Q6H PRN Fredy Todd MD        lidocaine PF 1 % injection 5 mL  5 mL Intradermal Once Fredy Todd MD        sodium chloride flush 0.9 % injection 10 mL  10 mL Intravenous 2 times per day Fredy Todd MD   10 mL at 04/04/21 0945    sodium chloride flush 0.9 % injection 10 mL  10 mL Intravenous PRN Fredy Todd MD        lidocaine (LIDODERM) 5 % patch removal  1 patch Transdermal Nightly Fredy Todd MD        vancomycin (VANCOCIN) 1,500 mg in dextrose 5 % 500 mL IVPB  1,500 mg Intravenous Q24H Fredy Todd MD   Stopped at 04/04/21 0615     Allergies   Allergen Reactions    Adhesive Tape Rash    Doxycycline Nausea And Vomiting    Reglan [Metoclopramide] Anxiety       Nursing Notes Reviewed    Physical Exam:  Triage VS:    ED Triage Vitals [04/03/21 1909]   Enc Vitals Group      BP (!) 149/86      Pulse 91      Resp 16      Temp 98.1 °F (36.7 °C)      Temp src       SpO2 99 %      Weight 270 lb (122.5 kg)      Height 5' 9\" (1.753 m)      Head Circumference       Peak Flow       Pain Score       Pain Loc       Pain Edu? Excl. in [address]? My pulse ox interpretation is -normal on room air    GENERAL: Patient is awake, alert, and oriented appropriately. Patient is resting comfortably in a still position on the exam table. Patient speaking in full and complete sentences. Well-nourished and well-developed. HEENT: Normocephalic and atraumatic. Pupils equal, round, and reactive to light. No redness or matting. Bilateral external ears are unremarkable. Nasal mucosa is pink without purulence. Oral mucosa is moist and pink. NECK: Supple with normal range of motion. No Kernig's or Brudzinski signs.   No visible JVD.    RESPIRATORY: Symmetric aeration bilaterally. No audible wheezes, rales, rhonchi, or stridor. CARDIOVASCULAR: Regular rate and rhythm. No audible murmurs, rubs, or gallops. No central or peripheral cyanosis. GASTROINTESTINAL: Soft, nontender, and nondistended. NEUROLOGICAL: Awake, alert and oriented x 3. Cranial nerves III through XII are grossly intact as tested without facial droop or dermatomal paresthesias. Of note, forehead wrinkles are symmetric and intact. Conjugate gaze without entrapment. No asymmetry of the corners of the mouth or nasolabial folds. No gross motor or cerebellar deficits. MUSCULOSKELETAL: Focal examination of the left upper extremity reveals a venous access device taped to the distal anterior upper arm. The dressing does appear somewhat saturated with thin bloody fluid. No purulence noted. The surrounding anterior tissues are somewhat tender to touch, but there is no induration, fluctuance, pulsatile mass, or crepitance. No palpable cords are noted. 2+ radial and 1+ ulnar pulse noted. Brisk capillary refill distally. Otherwise, no asymmetric edema, Wanetta Richards' sign, or cords. No tenderness or limitation range of motion to the bilateral shoulders, elbows, wrists, hips, knees, or ankles. No accompanying long bone tenderness or deformity. SKIN: Normal tone for ethnicity. Normal turgor and brisk capillary refill peripherally. No petechiae, purpura, vesicles, bullae, or other lesions. No icterus. PSYCHIATRIC: Normal mood. Normal affect. No voiced suicidal or homicidal ideation. Patient does not respond to internal stimuli. Emergency department course. Patient is brought to bed 31 and assessed and reassessed by me. Prior to initial evaluation, orders are placed for x-rays of the chest and left humerus.   After initial evaluation, patient orders are placed for venous ultrasound to assess for possible DVT and hopefully to confirm intravenous placement of the line. CBC and metabolic panel are ordered. Primary care will be consulted once results are available. Patient is agreeable to continuing plan. Upon most recent reevaluation, patient is resting comfortably. White blood cell count total is normal, though there is a slight neutrophilia. There is renal insufficiency, but potassium is satisfactory. Ultrasound shows no DVT. Interestingly, ultrasound also seems to confirm that the midline is in the brachial vein, but there is no explanation why fluid seems to be extravasating around it. With concern the patient may not be able to obtain his IV antibiotics, we have discussed placement in the hospital. Primary care Dr. Nathalie Chowdary is paged at [phone] to discuss admission. Initial verbal orders are discussed at 2149 with Dr. Maurice Brice. Decision time to admit: 2145. Patient seen during [facility], I did don appropriate PPE during my encounters with the patient, including n95 (when appropriate) mask and eye protection as appropriate.     I have reviewed and interpreted all of the currently available lab results from this visit (if applicable):  Results for orders placed or performed during the hospital encounter of 04/03/21   CBC Auto Differential   Result Value Ref Range    WBC 7.1 4.0 - 10.5 K/CU MM    RBC 3.19 (L) 4.6 - 6.2 M/CU MM    Hemoglobin 9.6 (L) 13.5 - 18.0 GM/DL    Hematocrit 30.1 (L) 42 - 52 %    MCV 94.4 78 - 100 FL    MCH 30.1 27 - 31 PG    MCHC 31.9 (L) 32.0 - 36.0 %    RDW 12.4 11.7 - 14.9 %    Platelets 622 834 - 324 K/CU MM    MPV 10.0 7.5 - 11.1 FL    Differential Type AUTOMATED DIFFERENTIAL     Segs Relative 71.6 (H) 36 - 66 %    Lymphocytes % 17.1 (L) 24 - 44 %    Monocytes % 8.7 (H) 0 - 4 %    Eosinophils % 0.7 0 - 3 %    Basophils % 0.8 0 - 1 %    Segs Absolute 5.1 K/CU MM    Lymphocytes Absolute 1.2 K/CU MM    Monocytes Absolute 0.6 K/CU MM    Eosinophils Absolute 0.1 K/CU MM    Basophils Absolute 0.1 K/CU MM    Nucleated RBC % 0.0 %    Total Nucleated RBC 0.0 K/CU MM    Total Immature Neutrophil 0.08 K/CU MM    Immature Neutrophil % 1.1 (H) 0 - 0.43 %   Basic Metabolic Panel w/ Reflex to MG   Result Value Ref Range    Sodium 137 135 - 145 MMOL/L    Potassium 5.0 3.5 - 5.1 MMOL/L    Chloride 107 99 - 110 mMol/L    CO2 20 (L) 21 - 32 MMOL/L    Anion Gap 10 4 - 16    BUN 39 (H) 6 - 23 MG/DL    CREATININE 1.9 (H) 0.9 - 1.3 MG/DL    Glucose 196 (H) 70 - 99 MG/DL    Calcium 8.5 8.3 - 10.6 MG/DL    GFR Non- 39 (L) >60 mL/min/1.73m2    GFR  47 (L) >60 mL/min/1.73m2   CBC auto differential   Result Value Ref Range    WBC 8.4 4.0 - 10.5 K/CU MM    RBC 2.93 (L) 4.6 - 6.2 M/CU MM    Hemoglobin 8.8 (L) 13.5 - 18.0 GM/DL    Hematocrit 27.1 (L) 42 - 52 %    MCV 92.5 78 - 100 FL    MCH 30.0 27 - 31 PG    MCHC 32.5 32.0 - 36.0 %    RDW 12.6 11.7 - 14.9 %    Platelets 803 247 - 524 K/CU MM    MPV 9.4 7.5 - 11.1 FL    Differential Type AUTOMATED DIFFERENTIAL     Segs Relative 70.3 (H) 36 - 66 %    Lymphocytes % 18.7 (L) 24 - 44 %    Monocytes % 8.6 (H) 0 - 4 %    Eosinophils % 0.7 0 - 3 %    Basophils % 0.7 0 - 1 %    Segs Absolute 5.9 K/CU MM    Lymphocytes Absolute 1.6 K/CU MM    Monocytes Absolute 0.7 K/CU MM    Eosinophils Absolute 0.1 K/CU MM    Basophils Absolute 0.1 K/CU MM    Nucleated RBC % 0.0 %    Total Nucleated RBC 0.0 K/CU MM    Total Immature Neutrophil 0.08 K/CU MM    Immature Neutrophil % 1.0 (H) 0 - 0.43 %   POCT Glucose   Result Value Ref Range    POC Glucose 193 (H) 70 - 99 MG/DL   POCT Glucose   Result Value Ref Range    POC Glucose 232 (H) 70 - 99 MG/DL        Radiographs (if obtained):  Radiologist's Report Reviewed:  Xr Humerus Left (min 2 Views)    Result Date: 4/3/2021  EXAMINATION: TWO XRAY VIEWS OF THE LEFT HUMERUS 4/3/2021 8:09 pm COMPARISON: None HISTORY: ORDERING SYSTEM PROVIDED HISTORY: midline TECHNOLOGIST PROVIDED HISTORY: Reason for exam:->midline Reason for Exam: painful midline Acuity: Acute Type of Exam: Initial Relevant Medical/Surgical History: midline placed this past Monday. Leaking meds, painful FINDINGS: No acute fracture or dislocation. Joint spaces and alignment are maintained. Soft tissues are unremarkable. There is an IV noted in the left upper extremity however this is just above the level of the elbow. No acute osseous abnormality. IV is noted in the left upper extremity just above the level of the elbow and is somewhat atypical in location for a midline catheter. Consider repositioning or removal.     Xr Chest Portable    Result Date: 4/3/2021  EXAMINATION: ONE XRAY VIEW OF THE CHEST 4/3/2021 8:09 pm COMPARISON: Chest 03/21/2019 HISTORY: ORDERING SYSTEM PROVIDED HISTORY: midline TECHNOLOGIST PROVIDED HISTORY: Reason for exam:->midline Reason for Exam: painful midline Acuity: Acute Type of Exam: Initial Relevant Medical/Surgical History: Midline placed this past Monday. Leaking meds and painful FINDINGS: The cardiomediastinal and hilar silhouettes appear unremarkable. The lungs appear clear. No pleural effusion evident. No pneumothorax is seen. No acute osseous abnormality is identified. No radiographic evidence of acute cardiopulmonary disease. Vl Dup Upper Extremity Venous Left    Result Date: 4/3/2021  EXAMINATION: DUPLEX ULTRASOUND OF THE LEFT UPPER EXTREMITY FOR DVT, 4/3/2021 11:10 pm TECHNIQUE: Duplex ultrasound using B-mode/gray scaled imaging and Doppler spectral analysis and color flow was obtained of the deep venous structures of the upper extremity. COMPARISON: None. HISTORY: ORDERING SYSTEM PROVIDED HISTORY: Left arm pain and swelling TECHNOLOGIST PROVIDED HISTORY: Reason for exam:-> left arm pain and swelling, please confirm midline placement in vein Reason for Exam: pain, swelling, IV cath Acuity: Acute Type of Exam: Initial FINDINGS: There is normal flow and compressibility of the visualized venous structures. There is no evidence of echogenic thrombus. The veins demonstrate good compressibility with normal color flow study and spectral analysis. Catheter tip confirmed in medial brachial vein. No evidence of DVT. Catheter tip confirmed in medial brachial vein. Medical decision making:  Patient presents to the emerged part with concern for a malfunction of a recently placed midline IV in the left arm. IV had been functioning well enough until this morning when fluid began to leak around the venipuncture site, the area began to bleed, and the arm became more tight feeling. Physical exam at this time does not suggest compartment syndrome, fasciitis, septic arthritis, respiratory distress, chest pain or discomfort, or clear evidence of DVT or reason for the midline dysfunction. Patient will be placed in the hospital to ensure adequate IV access and continuing antibiotics for the pre-existing cellulitis. Procedures: None. Consultations: Dr. Jazlyn Do. Clinical Impression:  1. Left leg cellulitis    2. Diabetic foot ([address]    3. Poor intravenous access      Disposition referral (if applicable):  No follow-up provider specified. Disposition medications (if applicable):  Current Discharge Medication List      START taking these medications    Details   aspirin 81 MG EC tablet Take 1 tablet by mouth daily  Qty: 30 tablet, Refills: 3           ED Provider Disposition Time  DISPOSITION Admitted 04/03/2021 11:20:37 PM      Comment: Please note this report has been produced using speech recognition software and may contain errors related to that system including errors in grammar, punctuation, and spelling, as well as words and phrases that may be inappropriate. Efforts were made to edit the dictations.         Shailesh Holbrook MD  04/04/21 9939

## 2021-04-05 NOTE — H&P
abdominal surgery, toe  amputation, tonsillectomy. FAMILY HISTORY:  Significant for cancer, stroke, diabetes, high blood  pressure, kidney disease, mental illness and ADHD. SOCIAL HISTORY:  , former smoker, quit smoking in 2017. Alcohol  occasionally. The patient also uses marijuana. ALLERGIES:  The patient is allergic to ADHESIVE TAPE, DOXYCYCLINE,  REGLAN. PHYSICAL EXAMINATION:  VITAL SIGNS:  The patient's physical examination shows that initially  the patient's blood pressure was high at 149/86, however, the patient's  blood pressure has gone up to 162/89. HEENT:  The patient has normocephalic head. No sinus tenderness. NECK:  Without any stiffness, thyromegaly. RS:  The patient has a fair air entry. No wheezing or rhonchi. CARDIOVASCULAR SYSTEM:  S1, S2 present. Rate and rhythm is regular. ABDOMEN:  Soft, nontender. Bowel sounds are present. EXTREMITIES:  Without any clubbing, cyanosis. The patient does have  edema. SKIN EXAMINATION:  There is redness in the lower extremity, particularly  on the left side. The patient also has a dressing of the left foot  area. INVESTIGATIONS:  Initially WBC 7.1, hemoglobin 9.7, hematocrit is 30.1. The patient's platelet count is 070. BUN is 13, creatinine is 1.9. The  patient's glucose is 196. This morning, blood sugar has come down to  193. The patient's x-ray chest is negative for any acute process. Venous Doppler of the left upper extremity is negative for DVT. ASSESSMENT:  1. Cellulitis of the lower extremity. 2.  Limited access to IV line for antibiotic. 3.  Diabetes. 4.  Hypertension, uncontrolled. 5.  Obesity. 6.  Diabetic neuropathy. 7.  Diabetic foot infection. PLAN:  At this time, the patient's antibiotic will be continued.   A PICC  team has been consulted and once the PICC line is cleared up, the  patient can be discharged today; if not, the patient will stay here  overnight and possibly will be discharged tomorrow. Discussed the  patient's condition in detail with the patient as well as the patient's  nursing staff.         HERACLIO ACOSTA    D: 04/04/2021 19:19:49       T: 04/04/2021 19:22:42     [email or web address]  Job#: [id number]     Doc#: [id number]    CC:

## 2021-04-05 NOTE — DISCHARGE SUMMARY
Ilsa Severance, MD, Internal Medicine    [address] [facility]   [phone]   Patient ID  Mer Aly   1975  [id number]          Admit date: 4/3/2021   Discharge date: 4/4/2021      Admitting Physician: Falguni Levin MD   Discharge Physician: Charissa Nobles MD    Discharge Diagnoses:   Cellulitis   Leg   Difficulty with access  Diabetic neuropathy  Diabetes type 2  Hypertension    Discharged Condition: fair    Hospital Course: Pt is 39year old female, who has history of  Recent cellulitis of the lower ext, for which on iv vancomycin. Pt's wife had difficulty giving medications through iv and blood was coming out around the tube. He was admitted and home medications were restarted. Midline was exchanged. He was discharged home in stable condition    Relevant Investigations    Disposition: home    Patient Instructions:    Citlali Vivas   Home Medication Instructions BSO:[id number]    Printed on:04/05/21 7522   Medication Information                      aspirin 81 MG EC tablet  Take 1 tablet by mouth daily             glucose monitoring kit (FREESTYLE) monitoring kit  1 each by Does not apply route once for 1 dose. insulin glargine (LANTUS) 100 UNIT/ML injection vial  Inject 40 Units into the skin nightly             insulin lispro (HUMALOG) 100 UNIT/ML injection vial  Inject 25 Units into the skin 3 times daily (with meals)             Lancets MISC  1 each by Does not apply route daily             linagliptin (TRADJENTA) 5 MG tablet  Take 1 tablet by mouth daily             ondansetron (ZOFRAN) 4 MG tablet  Take 1 tablet by mouth every 8 hours as needed for Nausea or Vomiting             vancomycin (VANCOCIN) infusion  Infuse 1,500 mg intravenously every 24 hours for 14 days Compound per protocol.                     Activity: activity as tolerated  Diet: cardiac diet    Follow-up with Fredy Farr in 7 days    Signed: Charissa Tanner spent on discharge 35 minutes

## 2021-04-09 ENCOUNTER — HOSPITAL ENCOUNTER (OUTPATIENT)
Age: 46
Setting detail: SPECIMEN
Discharge: HOME | End: 2021-04-09
Payer: MEDICARE

## 2021-04-09 LAB
ALBUMIN SERPL-MCNC: 2.7 GM/DL (ref 3.4–5)
ALP BLD-CCNC: 58 IU/L (ref 40–128)
ALT SERPL-CCNC: 11 U/L (ref 10–40)
ANION GAP SERPL CALCULATED.3IONS-SCNC: 9 MMOL/L (ref 4–16)
AST SERPL-CCNC: 10 IU/L (ref 15–37)
BASOPHILS ABSOLUTE: 0.1 K/CU MM
BASOPHILS RELATIVE PERCENT: 0.9 % (ref 0–1)
BILIRUB SERPL-MCNC: 0.2 MG/DL (ref 0–1)
BUN BLDV-MCNC: 34 MG/DL (ref 6–23)
C-REACTIVE PROTEIN, HIGH SENSITIVITY: 3 MG/L
CALCIUM SERPL-MCNC: 7.9 MG/DL (ref 8.3–10.6)
CHLORIDE BLD-SCNC: 102 MMOL/L (ref 99–110)
CO2: 21 MMOL/L (ref 21–32)
CREAT SERPL-MCNC: 1.9 MG/DL (ref 0.9–1.3)
DIFFERENTIAL TYPE: ABNORMAL
DOSE AMOUNT: ABNORMAL
DOSE TIME: ABNORMAL
EOSINOPHILS ABSOLUTE: 0.1 K/CU MM
EOSINOPHILS RELATIVE PERCENT: 1.2 % (ref 0–3)
ERYTHROCYTE SEDIMENTATION RATE: 133 MM/HR (ref 0–15)
GFR AFRICAN AMERICAN: 47 ML/MIN/1.73M2
GFR NON-AFRICAN AMERICAN: 39 ML/MIN/1.73M2
GLUCOSE BLD-MCNC: 398 MG/DL (ref 70–99)
HCT VFR BLD CALC: 28 % (ref 42–52)
HEMOGLOBIN: 9.5 GM/DL (ref 13.5–18)
IMMATURE NEUTROPHIL %: 0.5 % (ref 0–0.43)
LYMPHOCYTES ABSOLUTE: 1 K/CU MM
LYMPHOCYTES RELATIVE PERCENT: 17.7 % (ref 24–44)
MCH RBC QN AUTO: 30.8 PG (ref 27–31)
MCHC RBC AUTO-ENTMCNC: 33.9 % (ref 32–36)
MCV RBC AUTO: 90.9 FL (ref 78–100)
MONOCYTES ABSOLUTE: 0.4 K/CU MM
MONOCYTES RELATIVE PERCENT: 7.8 % (ref 0–4)
NUCLEATED RBC %: 0 %
PDW BLD-RTO: 13.1 % (ref 11.7–14.9)
PLATELET # BLD: 275 K/CU MM (ref 140–440)
PMV BLD AUTO: 10.3 FL (ref 7.5–11.1)
POTASSIUM SERPL-SCNC: 4.8 MMOL/L (ref 3.5–5.1)
RBC # BLD: 3.08 M/CU MM (ref 4.6–6.2)
SEGMENTED NEUTROPHILS ABSOLUTE COUNT: 4.1 K/CU MM
SEGMENTED NEUTROPHILS RELATIVE PERCENT: 71.9 % (ref 36–66)
SODIUM BLD-SCNC: 132 MMOL/L (ref 135–145)
TOTAL IMMATURE NEUTOROPHIL: 0.03 K/CU MM
TOTAL NUCLEATED RBC: 0 K/CU MM
TOTAL PROTEIN: 5.8 GM/DL (ref 6.4–8.2)
VANCOMYCIN TROUGH: 6.8 UG/ML (ref 10–20)
WBC # BLD: 5.6 K/CU MM (ref 4–10.5)

## 2021-04-09 PROCEDURE — 80202 ASSAY OF VANCOMYCIN: CPT

## 2021-04-09 PROCEDURE — 85652 RBC SED RATE AUTOMATED: CPT

## 2021-04-09 PROCEDURE — 80053 COMPREHEN METABOLIC PANEL: CPT

## 2021-04-09 PROCEDURE — 85025 COMPLETE CBC W/AUTO DIFF WBC: CPT

## 2021-04-09 PROCEDURE — 86140 C-REACTIVE PROTEIN: CPT

## 2021-05-13 ENCOUNTER — APPOINTMENT (OUTPATIENT)
Dept: ULTRASOUND IMAGING | Age: 46
DRG: 853 | End: 2021-05-13
Payer: MEDICARE

## 2021-05-13 ENCOUNTER — HOSPITAL ENCOUNTER (INPATIENT)
Age: 46
LOS: 8 days | Discharge: HOME | DRG: 853 | End: 2021-05-21
Attending: FAMILY MEDICINE | Admitting: FAMILY MEDICINE
Payer: MEDICARE

## 2021-05-13 DIAGNOSIS — D72.829 LEUKOCYTOSIS, UNSPECIFIED TYPE: ICD-10-CM

## 2021-05-13 DIAGNOSIS — N49.2 CELLULITIS OF SCROTUM: ICD-10-CM

## 2021-05-13 DIAGNOSIS — N17.9 AKI (ACUTE KIDNEY INJURY) (HCC): ICD-10-CM

## 2021-05-13 DIAGNOSIS — R77.8 ELEVATED TROPONIN: ICD-10-CM

## 2021-05-13 DIAGNOSIS — N45.1 ACUTE EPIDIDYMITIS: Primary | ICD-10-CM

## 2021-05-13 LAB
ANION GAP SERPL CALCULATED.3IONS-SCNC: 9 MMOL/L (ref 4–16)
BACTERIA: ABNORMAL /HPF
BASOPHILS ABSOLUTE: 0 K/CU MM
BASOPHILS RELATIVE PERCENT: 0.2 % (ref 0–1)
BILIRUBIN URINE: NEGATIVE MG/DL
BLOOD, URINE: ABNORMAL
BUN BLDV-MCNC: 33 MG/DL (ref 6–23)
CALCIUM SERPL-MCNC: 8.3 MG/DL (ref 8.3–10.6)
CHLORIDE BLD-SCNC: 99 MMOL/L (ref 99–110)
CHOLESTEROL: 145 MG/DL
CLARITY: ABNORMAL
CO2: 21 MMOL/L (ref 21–32)
COLOR: ABNORMAL
CREAT SERPL-MCNC: 2.8 MG/DL (ref 0.9–1.3)
DIFFERENTIAL TYPE: ABNORMAL
EKG ATRIAL RATE: 93 BPM
EKG DIAGNOSIS: NORMAL
EKG P AXIS: 27 DEGREES
EKG P-R INTERVAL: 158 MS
EKG Q-T INTERVAL: 354 MS
EKG QRS DURATION: 82 MS
EKG QTC CALCULATION (BAZETT): 440 MS
EKG R AXIS: 9 DEGREES
EKG T AXIS: 58 DEGREES
EKG VENTRICULAR RATE: 93 BPM
EOSINOPHILS ABSOLUTE: 0.1 K/CU MM
EOSINOPHILS RELATIVE PERCENT: 0.7 % (ref 0–3)
ESTIMATED AVERAGE GLUCOSE: 232 MG/DL
GFR AFRICAN AMERICAN: 30 ML/MIN/1.73M2
GFR NON-AFRICAN AMERICAN: 25 ML/MIN/1.73M2
GLUCOSE BLD-MCNC: 225 MG/DL (ref 70–99)
GLUCOSE BLD-MCNC: 280 MG/DL (ref 70–99)
GLUCOSE BLD-MCNC: 321 MG/DL (ref 70–99)
GLUCOSE BLD-MCNC: 377 MG/DL (ref 70–99)
GLUCOSE BLD-MCNC: 388 MG/DL (ref 70–99)
GLUCOSE, URINE: >500 MG/DL
HBA1C MFR BLD: 9.7 % (ref 4.2–6.3)
HCT VFR BLD CALC: 29 % (ref 42–52)
HDLC SERPL-MCNC: 29 MG/DL
HEMOGLOBIN: 9.9 GM/DL (ref 13.5–18)
HYALINE CASTS: 10 /LPF
IMMATURE NEUTROPHIL %: 1.9 % (ref 0–0.43)
KETONES, URINE: NEGATIVE MG/DL
LACTATE: 1.5 MMOL/L (ref 0.4–2)
LDL CHOLESTEROL DIRECT: 87 MG/DL
LEUKOCYTE ESTERASE, URINE: NEGATIVE
LV EF: 58 %
LVEF MODALITY: NORMAL
LYMPHOCYTES ABSOLUTE: 0.9 K/CU MM
LYMPHOCYTES RELATIVE PERCENT: 5.5 % (ref 24–44)
MCH RBC QN AUTO: 29.8 PG (ref 27–31)
MCHC RBC AUTO-ENTMCNC: 34.1 % (ref 32–36)
MCV RBC AUTO: 87.3 FL (ref 78–100)
MONOCYTES ABSOLUTE: 1 K/CU MM
MONOCYTES RELATIVE PERCENT: 6.2 % (ref 0–4)
MUCUS: ABNORMAL HPF
NITRITE URINE, QUANTITATIVE: NEGATIVE
NUCLEATED RBC %: 0 %
PDW BLD-RTO: 12.6 % (ref 11.7–14.9)
PH, URINE: 5 (ref 5–8)
PLATELET # BLD: 203 K/CU MM (ref 140–440)
PMV BLD AUTO: 10.4 FL (ref 7.5–11.1)
POTASSIUM SERPL-SCNC: 4 MMOL/L (ref 3.5–5.1)
PROSTATE SPECIFIC ANTIGEN: 0.14 NG/ML (ref 0–4)
PROTEIN UA: >500 MG/DL
RBC # BLD: 3.32 M/CU MM (ref 4.6–6.2)
RBC URINE: <1 /HPF (ref 0–3)
SEGMENTED NEUTROPHILS ABSOLUTE COUNT: 13.7 K/CU MM
SEGMENTED NEUTROPHILS RELATIVE PERCENT: 85.5 % (ref 36–66)
SODIUM BLD-SCNC: 129 MMOL/L (ref 135–145)
SPECIFIC GRAVITY UA: 1.02 (ref 1–1.03)
SQUAMOUS EPITHELIAL: 4 /HPF
TOTAL IMMATURE NEUTOROPHIL: 0.31 K/CU MM
TOTAL NUCLEATED RBC: 0 K/CU MM
TRICHOMONAS: ABNORMAL /HPF
TRIGL SERPL-MCNC: 188 MG/DL
TROPONIN T: 0.01 NG/ML
TROPONIN T: 0.01 NG/ML
TROPONIN T: <0.01 NG/ML
UROBILINOGEN, URINE: 2 MG/DL (ref 0.2–1)
WBC # BLD: 16 K/CU MM (ref 4–10.5)
WBC UA: 12 /HPF (ref 0–2)

## 2021-05-13 PROCEDURE — 80061 LIPID PANEL: CPT

## 2021-05-13 PROCEDURE — 87591 N.GONORRHOEAE DNA AMP PROB: CPT

## 2021-05-13 PROCEDURE — 94010 BREATHING CAPACITY TEST: CPT

## 2021-05-13 PROCEDURE — 36415 COLL VENOUS BLD VENIPUNCTURE: CPT

## 2021-05-13 PROCEDURE — 94761 N-INVAS EAR/PLS OXIMETRY MLT: CPT

## 2021-05-13 PROCEDURE — 93306 TTE W/DOPPLER COMPLETE: CPT

## 2021-05-13 PROCEDURE — 99211 OFF/OP EST MAY X REQ PHY/QHP: CPT

## 2021-05-13 PROCEDURE — 83036 HEMOGLOBIN GLYCOSYLATED A1C: CPT

## 2021-05-13 PROCEDURE — 2580000003 HC RX 258: Performed by: FAMILY MEDICINE

## 2021-05-13 PROCEDURE — 83605 ASSAY OF LACTIC ACID: CPT

## 2021-05-13 PROCEDURE — 87491 CHLMYD TRACH DNA AMP PROBE: CPT

## 2021-05-13 PROCEDURE — 6360000002 HC RX W HCPCS: Performed by: PHYSICIAN ASSISTANT

## 2021-05-13 PROCEDURE — 76870 US EXAM SCROTUM: CPT

## 2021-05-13 PROCEDURE — 84484 ASSAY OF TROPONIN QUANT: CPT

## 2021-05-13 PROCEDURE — 93975 VASCULAR STUDY: CPT

## 2021-05-13 PROCEDURE — 83721 ASSAY OF BLOOD LIPOPROTEIN: CPT

## 2021-05-13 PROCEDURE — 1200000000 HC SEMI PRIVATE

## 2021-05-13 PROCEDURE — 81001 URINALYSIS AUTO W/SCOPE: CPT

## 2021-05-13 PROCEDURE — 99223 1ST HOSP IP/OBS HIGH 75: CPT | Performed by: INTERNAL MEDICINE

## 2021-05-13 PROCEDURE — 96365 THER/PROPH/DIAG IV INF INIT: CPT

## 2021-05-13 PROCEDURE — 6370000000 HC RX 637 (ALT 250 FOR IP): Performed by: PHYSICIAN ASSISTANT

## 2021-05-13 PROCEDURE — 99285 EMERGENCY DEPT VISIT HI MDM: CPT

## 2021-05-13 PROCEDURE — 6360000002 HC RX W HCPCS: Performed by: FAMILY MEDICINE

## 2021-05-13 PROCEDURE — 82962 GLUCOSE BLOOD TEST: CPT

## 2021-05-13 PROCEDURE — 6370000000 HC RX 637 (ALT 250 FOR IP): Performed by: FAMILY MEDICINE

## 2021-05-13 PROCEDURE — APPNB60 APP NON BILLABLE TIME 46-60 MINS: Performed by: NURSE PRACTITIONER

## 2021-05-13 PROCEDURE — G0103 PSA SCREENING: HCPCS

## 2021-05-13 PROCEDURE — 87040 BLOOD CULTURE FOR BACTERIA: CPT

## 2021-05-13 PROCEDURE — 96375 TX/PRO/DX INJ NEW DRUG ADDON: CPT

## 2021-05-13 PROCEDURE — 80048 BASIC METABOLIC PNL TOTAL CA: CPT

## 2021-05-13 PROCEDURE — 85025 COMPLETE CBC W/AUTO DIFF WBC: CPT

## 2021-05-13 PROCEDURE — 93010 ELECTROCARDIOGRAM REPORT: CPT | Performed by: INTERNAL MEDICINE

## 2021-05-13 PROCEDURE — 96366 THER/PROPH/DIAG IV INF ADDON: CPT

## 2021-05-13 PROCEDURE — 2580000003 HC RX 258: Performed by: PHYSICIAN ASSISTANT

## 2021-05-13 PROCEDURE — 93005 ELECTROCARDIOGRAM TRACING: CPT | Performed by: EMERGENCY MEDICINE

## 2021-05-13 RX ORDER — SODIUM CHLORIDE 0.9 % (FLUSH) 0.9 %
5-40 SYRINGE (ML) INJECTION EVERY 12 HOURS SCHEDULED
Status: DISCONTINUED | OUTPATIENT
Start: 2021-05-13 | End: 2021-05-21 | Stop reason: HOSPADM

## 2021-05-13 RX ORDER — TIZANIDINE 4 MG/1
4 TABLET ORAL EVERY 8 HOURS PRN
Status: DISCONTINUED | OUTPATIENT
Start: 2021-05-13 | End: 2021-05-17

## 2021-05-13 RX ORDER — INSULIN GLARGINE 100 [IU]/ML
60 INJECTION, SOLUTION SUBCUTANEOUS NIGHTLY
Status: DISCONTINUED | OUTPATIENT
Start: 2021-05-13 | End: 2021-05-17

## 2021-05-13 RX ORDER — SODIUM PHOSPHATE, DIBASIC AND SODIUM PHOSPHATE, MONOBASIC 7; 19 G/133ML; G/133ML
1 ENEMA RECTAL DAILY PRN
Status: DISCONTINUED | OUTPATIENT
Start: 2021-05-13 | End: 2021-05-21 | Stop reason: HOSPADM

## 2021-05-13 RX ORDER — SODIUM CHLORIDE 0.9 % (FLUSH) 0.9 %
5-40 SYRINGE (ML) INJECTION PRN
Status: DISCONTINUED | OUTPATIENT
Start: 2021-05-13 | End: 2021-05-21 | Stop reason: HOSPADM

## 2021-05-13 RX ORDER — MAGNESIUM SULFATE IN WATER 40 MG/ML
2000 INJECTION, SOLUTION INTRAVENOUS PRN
Status: DISCONTINUED | OUTPATIENT
Start: 2021-05-13 | End: 2021-05-21 | Stop reason: HOSPADM

## 2021-05-13 RX ORDER — NICOTINE POLACRILEX 4 MG
15 LOZENGE BUCCAL PRN
Status: DISCONTINUED | OUTPATIENT
Start: 2021-05-13 | End: 2021-05-21 | Stop reason: HOSPADM

## 2021-05-13 RX ORDER — POTASSIUM CHLORIDE 20 MEQ/1
40 TABLET, EXTENDED RELEASE ORAL PRN
Status: DISCONTINUED | OUTPATIENT
Start: 2021-05-13 | End: 2021-05-21 | Stop reason: HOSPADM

## 2021-05-13 RX ORDER — SODIUM CHLORIDE 9 MG/ML
INJECTION, SOLUTION INTRAVENOUS CONTINUOUS
Status: DISCONTINUED | OUTPATIENT
Start: 2021-05-13 | End: 2021-05-15

## 2021-05-13 RX ORDER — OXYCODONE HYDROCHLORIDE AND ACETAMINOPHEN 5; 325 MG/1; MG/1
1 TABLET ORAL EVERY 8 HOURS PRN
Status: DISCONTINUED | OUTPATIENT
Start: 2021-05-13 | End: 2021-05-13

## 2021-05-13 RX ORDER — PROMETHAZINE HYDROCHLORIDE 25 MG/1
12.5 TABLET ORAL EVERY 6 HOURS PRN
Status: DISCONTINUED | OUTPATIENT
Start: 2021-05-13 | End: 2021-05-21 | Stop reason: HOSPADM

## 2021-05-13 RX ORDER — DEXTROSE MONOHYDRATE 50 MG/ML
100 INJECTION, SOLUTION INTRAVENOUS PRN
Status: DISCONTINUED | OUTPATIENT
Start: 2021-05-13 | End: 2021-05-21 | Stop reason: HOSPADM

## 2021-05-13 RX ORDER — ONDANSETRON 2 MG/ML
4 INJECTION INTRAMUSCULAR; INTRAVENOUS EVERY 6 HOURS PRN
Status: DISCONTINUED | OUTPATIENT
Start: 2021-05-13 | End: 2021-05-21 | Stop reason: HOSPADM

## 2021-05-13 RX ORDER — GUAIFENESIN/DEXTROMETHORPHAN 100-10MG/5
10 SYRUP ORAL EVERY 6 HOURS PRN
Status: DISCONTINUED | OUTPATIENT
Start: 2021-05-13 | End: 2021-05-21 | Stop reason: HOSPADM

## 2021-05-13 RX ORDER — POTASSIUM CHLORIDE 7.45 MG/ML
10 INJECTION INTRAVENOUS PRN
Status: DISCONTINUED | OUTPATIENT
Start: 2021-05-13 | End: 2021-05-21 | Stop reason: HOSPADM

## 2021-05-13 RX ORDER — ASPIRIN 81 MG/1
324 TABLET, CHEWABLE ORAL ONCE
Status: COMPLETED | OUTPATIENT
Start: 2021-05-13 | End: 2021-05-13

## 2021-05-13 RX ORDER — SODIUM CHLORIDE 9 MG/ML
25 INJECTION, SOLUTION INTRAVENOUS PRN
Status: DISCONTINUED | OUTPATIENT
Start: 2021-05-13 | End: 2021-05-21 | Stop reason: HOSPADM

## 2021-05-13 RX ORDER — ACETAMINOPHEN 325 MG/1
650 TABLET ORAL EVERY 4 HOURS PRN
Status: DISCONTINUED | OUTPATIENT
Start: 2021-05-13 | End: 2021-05-13 | Stop reason: SDUPTHER

## 2021-05-13 RX ORDER — NICOTINE 21 MG/24HR
1 PATCH, TRANSDERMAL 24 HOURS TRANSDERMAL DAILY PRN
Status: DISCONTINUED | OUTPATIENT
Start: 2021-05-13 | End: 2021-05-21 | Stop reason: HOSPADM

## 2021-05-13 RX ORDER — 0.9 % SODIUM CHLORIDE 0.9 %
1000 INTRAVENOUS SOLUTION INTRAVENOUS ONCE
Status: COMPLETED | OUTPATIENT
Start: 2021-05-13 | End: 2021-05-13

## 2021-05-13 RX ORDER — MORPHINE SULFATE 4 MG/ML
4 INJECTION, SOLUTION INTRAMUSCULAR; INTRAVENOUS
Status: DISCONTINUED | OUTPATIENT
Start: 2021-05-13 | End: 2021-05-15

## 2021-05-13 RX ORDER — BENZONATATE 100 MG/1
200 CAPSULE ORAL 3 TIMES DAILY PRN
Status: DISCONTINUED | OUTPATIENT
Start: 2021-05-13 | End: 2021-05-21 | Stop reason: HOSPADM

## 2021-05-13 RX ORDER — ONDANSETRON 4 MG/1
4 TABLET, ORALLY DISINTEGRATING ORAL EVERY 8 HOURS PRN
Status: DISCONTINUED | OUTPATIENT
Start: 2021-05-13 | End: 2021-05-21 | Stop reason: HOSPADM

## 2021-05-13 RX ORDER — ACETAMINOPHEN 325 MG/1
650 TABLET ORAL EVERY 6 HOURS PRN
Status: DISCONTINUED | OUTPATIENT
Start: 2021-05-13 | End: 2021-05-21 | Stop reason: HOSPADM

## 2021-05-13 RX ORDER — DEXTROSE MONOHYDRATE 25 G/50ML
12.5 INJECTION, SOLUTION INTRAVENOUS PRN
Status: DISCONTINUED | OUTPATIENT
Start: 2021-05-13 | End: 2021-05-21 | Stop reason: HOSPADM

## 2021-05-13 RX ORDER — CALCIUM CARBONATE 200(500)MG
750 TABLET,CHEWABLE ORAL 3 TIMES DAILY PRN
Status: DISCONTINUED | OUTPATIENT
Start: 2021-05-13 | End: 2021-05-21 | Stop reason: HOSPADM

## 2021-05-13 RX ORDER — GUAIFENESIN 600 MG/1
1200 TABLET, EXTENDED RELEASE ORAL 2 TIMES DAILY
Status: DISPENSED | OUTPATIENT
Start: 2021-05-13 | End: 2021-05-20

## 2021-05-13 RX ORDER — OXYCODONE HYDROCHLORIDE AND ACETAMINOPHEN 5; 325 MG/1; MG/1
1 TABLET ORAL EVERY 4 HOURS PRN
Status: DISCONTINUED | OUTPATIENT
Start: 2021-05-13 | End: 2021-05-21 | Stop reason: HOSPADM

## 2021-05-13 RX ORDER — POLYETHYLENE GLYCOL 3350 17 G/17G
17 POWDER, FOR SOLUTION ORAL DAILY PRN
Status: DISCONTINUED | OUTPATIENT
Start: 2021-05-13 | End: 2021-05-21 | Stop reason: HOSPADM

## 2021-05-13 RX ORDER — MORPHINE SULFATE 4 MG/ML
4 INJECTION, SOLUTION INTRAMUSCULAR; INTRAVENOUS EVERY 30 MIN PRN
Status: DISCONTINUED | OUTPATIENT
Start: 2021-05-13 | End: 2021-05-13 | Stop reason: SDUPTHER

## 2021-05-13 RX ORDER — ASPIRIN 81 MG/1
81 TABLET ORAL DAILY
Status: DISCONTINUED | OUTPATIENT
Start: 2021-05-13 | End: 2021-05-21 | Stop reason: HOSPADM

## 2021-05-13 RX ORDER — INSULIN GLARGINE 100 [IU]/ML
40 INJECTION, SOLUTION SUBCUTANEOUS NIGHTLY
Status: DISCONTINUED | OUTPATIENT
Start: 2021-05-13 | End: 2021-05-13

## 2021-05-13 RX ORDER — ACETAMINOPHEN 650 MG/1
650 SUPPOSITORY RECTAL EVERY 6 HOURS PRN
Status: DISCONTINUED | OUTPATIENT
Start: 2021-05-13 | End: 2021-05-21 | Stop reason: HOSPADM

## 2021-05-13 RX ORDER — MORPHINE SULFATE 2 MG/ML
2 INJECTION, SOLUTION INTRAMUSCULAR; INTRAVENOUS EVERY 4 HOURS PRN
Status: DISCONTINUED | OUTPATIENT
Start: 2021-05-13 | End: 2021-05-13

## 2021-05-13 RX ORDER — ZOLPIDEM TARTRATE 5 MG/1
5 TABLET ORAL NIGHTLY PRN
Status: DISCONTINUED | OUTPATIENT
Start: 2021-05-13 | End: 2021-05-21 | Stop reason: HOSPADM

## 2021-05-13 RX ADMIN — OXYCODONE HYDROCHLORIDE AND ACETAMINOPHEN 1 TABLET: 5; 325 TABLET ORAL at 23:36

## 2021-05-13 RX ADMIN — MORPHINE SULFATE 4 MG: 4 INJECTION, SOLUTION INTRAMUSCULAR; INTRAVENOUS at 17:22

## 2021-05-13 RX ADMIN — SODIUM CHLORIDE 1000 ML: 9 INJECTION, SOLUTION INTRAVENOUS at 04:46

## 2021-05-13 RX ADMIN — SODIUM CHLORIDE: 9 INJECTION, SOLUTION INTRAVENOUS at 09:31

## 2021-05-13 RX ADMIN — SODIUM CHLORIDE, PRESERVATIVE FREE 10 ML: 5 INJECTION INTRAVENOUS at 21:18

## 2021-05-13 RX ADMIN — INSULIN GLARGINE 60 UNITS: 100 INJECTION, SOLUTION SUBCUTANEOUS at 21:26

## 2021-05-13 RX ADMIN — MORPHINE SULFATE 2 MG: 2 INJECTION, SOLUTION INTRAMUSCULAR; INTRAVENOUS at 09:31

## 2021-05-13 RX ADMIN — ONDANSETRON 4 MG: 2 INJECTION INTRAMUSCULAR; INTRAVENOUS at 17:23

## 2021-05-13 RX ADMIN — ENOXAPARIN SODIUM 30 MG: 30 INJECTION SUBCUTANEOUS at 09:31

## 2021-05-13 RX ADMIN — MORPHINE SULFATE 2 MG: 2 INJECTION, SOLUTION INTRAMUSCULAR; INTRAVENOUS at 14:40

## 2021-05-13 RX ADMIN — ASPIRIN 324 MG: 81 TABLET, CHEWABLE ORAL at 04:46

## 2021-05-13 RX ADMIN — OXYCODONE HYDROCHLORIDE AND ACETAMINOPHEN 1 TABLET: 5; 325 TABLET ORAL at 12:07

## 2021-05-13 RX ADMIN — ONDANSETRON 4 MG: 2 INJECTION INTRAMUSCULAR; INTRAVENOUS at 09:30

## 2021-05-13 RX ADMIN — VANCOMYCIN HYDROCHLORIDE 2000 MG: 1 INJECTION, POWDER, LYOPHILIZED, FOR SOLUTION INTRAVENOUS at 04:46

## 2021-05-13 RX ADMIN — SODIUM CHLORIDE: 9 INJECTION, SOLUTION INTRAVENOUS at 23:19

## 2021-05-13 RX ADMIN — SODIUM CHLORIDE, PRESERVATIVE FREE 10 ML: 5 INJECTION INTRAVENOUS at 09:31

## 2021-05-13 RX ADMIN — CEFTRIAXONE 1000 MG: 1 INJECTION, POWDER, FOR SOLUTION INTRAMUSCULAR; INTRAVENOUS at 09:30

## 2021-05-13 RX ADMIN — GUAIFENESIN 1200 MG: 600 TABLET, EXTENDED RELEASE ORAL at 21:17

## 2021-05-13 RX ADMIN — MORPHINE SULFATE 4 MG: 4 INJECTION, SOLUTION INTRAMUSCULAR; INTRAVENOUS at 04:46

## 2021-05-13 RX ADMIN — ONDANSETRON 4 MG: 2 INJECTION INTRAMUSCULAR; INTRAVENOUS at 23:36

## 2021-05-13 RX ADMIN — MORPHINE SULFATE 4 MG: 4 INJECTION, SOLUTION INTRAMUSCULAR; INTRAVENOUS at 21:17

## 2021-05-13 RX ADMIN — INSULIN LISPRO 25 UNITS: 100 INJECTION, SOLUTION INTRAVENOUS; SUBCUTANEOUS at 12:01

## 2021-05-13 ASSESSMENT — PAIN SCALES - GENERAL
PAINLEVEL_OUTOF10: 4
PAINLEVEL_OUTOF10: 8
PAINLEVEL_OUTOF10: 10
PAINLEVEL_OUTOF10: 8
PAINLEVEL_OUTOF10: 10

## 2021-05-13 ASSESSMENT — ENCOUNTER SYMPTOMS
BACK PAIN: 1
SHORTNESS OF BREATH: 1
WHEEZING: 0
CHEST TIGHTNESS: 0
BLOOD IN STOOL: 0

## 2021-05-13 ASSESSMENT — PAIN - FUNCTIONAL ASSESSMENT
PAIN_FUNCTIONAL_ASSESSMENT: PREVENTS OR INTERFERES SOME ACTIVE ACTIVITIES AND ADLS
PAIN_FUNCTIONAL_ASSESSMENT: PREVENTS OR INTERFERES SOME ACTIVE ACTIVITIES AND ADLS

## 2021-05-13 ASSESSMENT — PAIN DESCRIPTION - ONSET: ONSET: ON-GOING

## 2021-05-13 ASSESSMENT — PAIN DESCRIPTION - PROGRESSION
CLINICAL_PROGRESSION: NOT CHANGED
CLINICAL_PROGRESSION: NOT CHANGED

## 2021-05-13 ASSESSMENT — PAIN DESCRIPTION - PAIN TYPE: TYPE: ACUTE PAIN

## 2021-05-13 ASSESSMENT — PAIN DESCRIPTION - FREQUENCY: FREQUENCY: CONTINUOUS

## 2021-05-13 ASSESSMENT — PAIN DESCRIPTION - DESCRIPTORS: DESCRIPTORS: SHARP;CONSTANT

## 2021-05-13 ASSESSMENT — PAIN DESCRIPTION - LOCATION: LOCATION: GROIN

## 2021-05-13 NOTE — H&P
Negative for Abdominal pain, nausea, vomiting, diarrhea  Genitourinary: Negative for polyuria, dysuria   Hematologic/Lymphatic: Negative for bleeding tendency, easy bruising  Musculoskeletal: Negative for myalgias and arthralgias  Neurologic: Negative for confusion,dysarthria. Skin: Negative for itching,rash, positive for scrotum painful redness   Psychiatric: Negative for depression,anxiety, agitation. Endocrine: Negative for polydipsia,polyuria,heat /cold intolerance. Past Medical History:   has a past medical history of Abscess, Acute renal failure (ARF) ([address] Anxiety associated with depression, Anxiety associated with depression, Back pain, Chest pain, Diabetic nephropathy ([address] Diabetic neuropathy ([address] Diabetic ulcer of left midfoot associated with type 2 diabetes mellitus, with fat layer exposed ([address] Diverticulosis, DM (diabetes mellitus), type 2 ([address] Hyperlipidemia LDL goal < 100, Hypertension, Internal hemorrhoid, Nose fracture, Panic attacks, Pericarditis, Peripheral autonomic neuropathy due to diabetes mellitus ([address] Sebaceous cyst, URI (upper respiratory infection), WD-Wound, surgical, infected, initial encounter, and Wrist fracture. Past Surgical History:   has a past surgical history that includes Cardiac catheterization (2003, 2013); Tonsillectomy and adenoidectomy (1988); Upper gastrointestinal endoscopy (06/2/2011); Colonoscopy (6/2/2011); Nose surgery (1993); skin biopsy (N/A, [id number]); other surgical history (Right, 05/22/2015); Toe amputation; Abdomen surgery; other surgical history (12/04/2017); pr deep incis foot bone infectn (Left, 9/22/2018); Upper gastrointestinal endoscopy (N/A, 1/12/2019); Toe amputation (Right, 3/23/2019); and Toe amputation (Right, 8/6/2019). Medications:  No current facility-administered medications on file prior to encounter.       Current Outpatient Medications on File Prior to Encounter   Medication Sig Dispense Refill    aspirin 81 MG EC tablet Take 1 tablet by mouth daily 30 tablet 3    insulin glargine (LANTUS) 100 UNIT/ML injection vial Inject 40 Units into the skin nightly 1 vial 5    insulin lispro (HUMALOG) 100 UNIT/ML injection vial Inject 25 Units into the skin 3 times daily (with meals) 1 vial 5    linagliptin (TRADJENTA) 5 MG tablet Take 1 tablet by mouth daily 30 tablet 5    ondansetron (ZOFRAN) 4 MG tablet Take 1 tablet by mouth every 8 hours as needed for Nausea or Vomiting 20 tablet 0    Lancets MISC 1 each by Does not apply route daily 100 each 3    glucose monitoring kit (FREESTYLE) monitoring kit 1 each by Does not apply route once for 1 dose. 1 kit 0       Allergies: Allergies   Allergen Reactions    Adhesive Tape Rash    Doxycycline Nausea And Vomiting    Reglan [Metoclopramide] Anxiety        Social History:   reports that he quit smoking about 3 years ago. He quit after 20.00 years of use. He has never used smokeless tobacco. He reports current alcohol use. He reports current drug use. Frequency: 7.00 times per week. Drug: Marijuana. Family History:  family history includes ADHD in his daughter; Bleeding Prob in his mother; Cancer in his mother; Diabetes in his father and sister; Heart Disease in his father; High Blood Pressure in his father, sister, and another family member; Kidney Disease in his father; Mental Illness in his sister and another family member; Obesity in his father and sister; Other in his son; Stroke in his mother. ,     Immunization History   Administered Date(s) Administered    Influenza 10/15/2013    Influenza, [place], 6 mo and older, IM, PF (Flulaval, Fluarix) 09/19/2018    Pneumococcal Polysaccharide ([id number]) 05/02/2013       Physical Exam:  /74   Pulse 97   Temp 99.5 °F (37.5 °C) (Oral)   Resp 20   SpO2 96%     General appearance:  no distress . Well nourished  Eyes: Sclera clear, pupils equal  Cardiovascular: Regular rhythm, normal S1, S2.    No edema in lower extremities  Respiratory: Clear to auscultation bilaterally, no wheeze, good inspiratory effort  Gastrointestinal: Abdomen soft, non-tender, not distended, normal bowel sounds  Musculoskeletal: No cyanosis in digits, neck supple  Neurology: Cranial nerves grossly intact. Alert and oriented . No speech or motor deficits  Psychiatry: Appropriate affect. Not agitated  Skin: red painful skin over scrotum     Labs:  CBC:   Lab Results   Component Value Date    WBC 16.0 05/13/2021    RBC 3.32 05/13/2021    HGB 9.9 05/13/2021    HCT 29.0 05/13/2021    MCV 87.3 05/13/2021    MCH 29.8 05/13/2021    MCHC 34.1 05/13/2021    RDW 12.6 05/13/2021     05/13/2021    MPV 10.4 05/13/2021     BMP:    Lab Results   Component Value Date     05/13/2021    K 4.0 05/13/2021    CL 99 05/13/2021    CO2 21 05/13/2021    BUN 33 05/13/2021    CREATININE 2.8 05/13/2021    CALCIUM 8.3 05/13/2021    GFRAA 30 05/13/2021    LABGLOM 25 05/13/2021    GLUCOSE 321 05/13/2021       Chest Xray:   EKG:    I visualized CXR images and EKG strips      Patient Active Problem List   Diagnosis Code    Hiatal hernia K44.9    Diabetes mellitus type 2, improved controlled E11.65    Diabetic neuropathy (HCC) E11.40    Panic attack F41.0    Anxiety associated with depression F41.8    Neck pain M54.2    DANIELA (obstructive sleep apnea) G47.33    Urinary frequency R35.0    Bilateral carpal tunnel syndrome- left worse than right G56.03    Hyperlipidemia with target LDL less than 100 E78.5    HTN, goal below 140/90 I10    Bronchitis J40    Osteomyelitis ([address] M86.9    Diabetic ulcer of left midfoot ([address] E11.621, L97.429    WD-Diabetic ulcer of left midfoot associated with type 2 diabetes mellitus, with fat layer exposed ([address] E11.621, L97.422    Abscess V53.57    Periumbilical hernia I61.4    WD-Wound, surgical, infected, initial encounter [id number]    Sepsis ([facility] A41.9    Cellulitis of left foot L03. 116    Constipation K59.00    Intractable nausea and vomiting R11.2    Uncontrolled type 2 diabetes mellitus (HCC) E11.65    Nausea and vomiting R11.2    Diabetic foot infection (HCC) E11.628, L08.9    Acute renal failure (ARF) (HCC) N17.9    Osteomyelitis of fourth toe of right foot (HCC) M86.9    Cellulitis L03.90    Cellulitis of left arm L03.114    Cellulitis, scrotum N49.2         Active Hospital Problems    Diagnosis    Cellulitis, scrotum [N49.2]   ARF  DM II uncontrolled   Chest pain with elevated troponin  SOB  Hyponatremia           Assessment/Plan:   Tele   IVF  IV ABX , blood C/C  Pain control  Glucose control, insulin coverage   Cardiac markers, cardiac consullt  Home meds , reviewed and resumed as appropriate   Symptoms releif/Pain control  DVT proph           José Luis Izquierdo MD    5/13/2021 8:09 AM

## 2021-05-13 NOTE — PROGRESS NOTES
[address] [place] [address]    Sary Leonard is a 39 y.o. male started on vancomycin for empiric therapy. Pharmacy consulted by ED provider Jl Viera PA-C to order a dose of vancomycin in the emergency department. Other antimicrobials: --    Ht Readings from Last 1 Encounters:   04/04/21 5' 9\" (1.753 m)     Wt Readings from Last 3 Encounters:   04/04/21 260 lb 4.8 oz (118.1 kg)   03/26/21 274 lb 12.8 oz (124.6 kg)   03/22/21 270 lb (122.5 kg)        Pertinent Laboratory Values:   Temp Readings from Last 3 Encounters:   05/13/21 98.7 °F (37.1 °C)   04/04/21 98.1 °F (36.7 °C) (Oral)   03/29/21 98.8 °F (37.1 °C) (Oral)     Recent Labs     05/13/21  0058   WBC 16.0*     Recent Labs     05/13/21  0058   BUN 33*   CREATININE 2.8*     CrCl cannot be calculated (Unknown ideal weight.). No intake or output data in the 24 hours ending 05/13/21 0351    Assessment/Plan:  Pharmacy will order vancomycin 2,000 mg. Please note, pharmacy will order a one-time dose of vancomycin for the Emergency Department. The consult will need to be re-ordered if vancomycin is to continue upon admission. Thank you for the consult.   Harini Sarmiento [place]  5/13/2021 3:51 AM

## 2021-05-13 NOTE — ED PROVIDER NOTES
As physician-in-triage, I performed a virtual medical screening history and physical exam on this patient. HISTORY OF PRESENT ILLNESS  Zannie Mcburney is a 39 y.o. male with history of diabetes who presents emergency department complaints of 5 days worth of right-sided testicular swelling and pain. He also states over the last 2 days he has been experiencing increasing fatigue and lightheadedness. Patient had a recent hospitalization at the beginning of April for complicated cellulitis and was discharged with PICC line and IV antibiotics. He states that he finished the antibiotics just under 3 weeks ago. He has pain in the testicle describes a throbbing stabbing pain rated as moderate to severe in severity. No known trauma to the area. He has noticed any redness to the groin. He states that his blood sugar has been running in the 300s. Denies measured fevers but has experienced chills. Denies chest pain, nausea, vomiting. PHYSICAL EXAM  There were no vitals taken for this visit. On exam, the patient appears in no acute distress. Speech is clear. Breathing is unlabored.   Moves all extremities    Orders: CBC, BMP, EKG, troponin, urinalysis, ultrasound of the scrotum/testicle        Dahiana Rios DO  05/13/21 0038

## 2021-05-13 NOTE — CONSULTS
CARDIOLOGY CONSULT NOTE     Reason for consultation:  Elevated troponin     Referring physician:  Geraldine Naik MD     Primary care physician: Geraldine Naik MD      Thank you for the consult. Chief Complaints :  Chief Complaint   Patient presents with    Groin Swelling     right testicular swelling    Shortness of Breath        History of present illness:Yovanny is a 39 y. o.year old gentleman with a history of diabetes type 2 , pericarditis, recent cellulitis of lower extremity, and CKD who presents with testicular pain and swelling. Recently treated with IV antibiotics for cellulitis of leg now presents with testicular swelling and pain. He reports  over last couple days he has noticed histesticles have become swollen and painful. He also notes dizziness, lightheadedness and shortness of breath with exertion. States when doing activities such as getting a shower he becomes lightheaded and dizzy. States he feels as though the room is spinning. He notes dizziness with turning from side to side and has noticed nausea and vomiting with the dizziness. He states he had noticed chest discomfort that he describes as an aching feeling across anterior chest.  Aching is nonradiating. There are no associated symptoms. Feels aching sensation gets worse with activity and he feels better when he is resting. States he has been sleeping over the last 3 days as he feels extremely fatigued.         Left heart catheterization May 2019 showed no significant CAD    Past medical history:    has a past medical history of Abscess, Acute renal failure (ARF) ([address] Anxiety associated with depression, Anxiety associated with depression, Back pain, Chest pain, Diabetic nephropathy ([address] Diabetic neuropathy ([address] Diabetic ulcer of left midfoot associated with type 2 diabetes mellitus, with fat layer exposed ([address] Diverticulosis, DM (diabetes mellitus), type 2 ([address] Hyperlipidemia LDL goal < 100, Hypertension, Internal hemorrhoid, Nose fracture, Panic attacks, Pericarditis, Peripheral autonomic neuropathy due to diabetes mellitus ([facility] [address] Sebaceous cyst, URI (upper respiratory infection), WD-Wound, surgical, infected, initial encounter, and Wrist fracture. Past surgical history:   has a past surgical history that includes Cardiac catheterization (2003, 2013); Tonsillectomy and adenoidectomy (1988); Upper gastrointestinal endoscopy (06/2/2011); Colonoscopy (6/2/2011); Nose surgery (1993); skin biopsy (N/A, [id number]); other surgical history (Right, 05/22/2015); Toe amputation; Abdomen surgery; other surgical history (12/04/2017); pr deep incis foot bone infectn (Left, 9/22/2018); Upper gastrointestinal endoscopy (N/A, 1/12/2019); Toe amputation (Right, 3/23/2019); and Toe amputation (Right, 8/6/2019). Social History:   reports that he quit smoking about 3 years ago. He quit after 20.00 years of use. He has never used smokeless tobacco. He reports current alcohol use. He reports current drug use. Frequency: 7.00 times per week. Drug: Marijuana.   Family history:   no family history of CAD, STROKE of DM at early age    Allergies   Allergen Reactions    Adhesive Tape Rash    Doxycycline Nausea And Vomiting    Reglan [Metoclopramide] Anxiety       morphine sulfate (PF) injection 4 mg, Q30 Min PRN  acetaminophen (TYLENOL) tablet 650 mg, Q4H PRN  ondansetron (ZOFRAN-ODT) disintegrating tablet 4 mg, Q8H PRN  insulin glargine (LANTUS) injection vial 40 Units, Nightly  insulin lispro (HUMALOG) injection vial 25 Units, TID WC  aspirin EC tablet 81 mg, Daily  glucose (GLUTOSE) 40 % oral gel 15 g, PRN  dextrose 50 % IV solution, PRN  glucagon (rDNA) injection 1 mg, PRN  dextrose 5 % solution, PRN  0.9 % sodium chloride infusion, Continuous  sodium chloride flush 0.9 % injection 5-40 mL, 2 times per day  sodium chloride flush 0.9 % injection 5-40 mL, PRN  0.9 % sodium chloride infusion, PRN  potassium chloride (KLOR-CON M) extended release tablet 40 mEq, PRN    Or  potassium bicarb-citric acid (EFFER-K) effervescent tablet 40 mEq, PRN    Or  potassium chloride 10 mEq/100 mL IVPB (Peripheral Line), PRN  magnesium sulfate 2000 mg in 50 mL IVPB premix, PRN  promethazine (PHENERGAN) tablet 12.5 mg, Q6H PRN    Or  ondansetron (ZOFRAN) injection 4 mg, Q6H PRN  polyethylene glycol (GLYCOLAX) packet 17 g, Daily PRN  fleet rectal enema 1 enema, Daily PRN  nicotine (NICODERM CQ) 21 MG/24HR 1 patch, Daily PRN  acetaminophen (TYLENOL) tablet 650 mg, Q6H PRN    Or  acetaminophen (TYLENOL) suppository 650 mg, Q6H PRN  enoxaparin (LOVENOX) injection 30 mg, Daily  zolpidem (AMBIEN) tablet 5 mg, Nightly PRN  oxyCODONE-acetaminophen (PERCOCET) 5-325 MG per tablet 1 tablet, Q8H PRN  guaiFENesin-dextromethorphan (ROBITUSSIN DM) 100-10 MG/5ML syrup 10 mL, Q6H PRN  benzonatate (TESSALON) capsule 200 mg, TID PRN  guaiFENesin (MUCINEX) extended release tablet 1,200 mg, BID  calcium carbonate (TUMS) chewable tablet 750 mg, TID PRN  tiZANidine (ZANAFLEX) tablet 4 mg, Q8H PRN  morphine (PF) injection 2 mg, Q4H PRN  cefTRIAXone (ROCEPHIN) 1000 mg IVPB in 50 mL D5W minibag, Q24H  vancomycin (VANCOCIN) intermittent dosing (placeholder), See Admin Instructions      Current Facility-Administered Medications   Medication Dose Route Frequency Provider Last Rate Last Admin    morphine sulfate (PF) injection 4 mg  4 mg Intravenous Q30 Min PRN Dariusz Funk PA-C   4 mg at 05/13/21 0446    acetaminophen (TYLENOL) tablet 650 mg  650 mg Oral Q4H PRN José Luis Izquierdo MD        ondansetron (ZOFRAN-ODT) disintegrating tablet 4 mg  4 mg Oral Q8H PRN José Luis Izquierdo MD        insulin glargine (LANTUS) injection vial 40 Units  40 Units Subcutaneous Nightly José Luis Izquierdo MD        insulin lispro (HUMALOG) injection vial 25 Units  25 Units Subcutaneous TID WC José Luis Izquierdo MD        aspirin EC tablet 81 mg  81 mg Oral Daily José Luis Izquierdo MD        glucose (GLUTOSE) 40 % oral gel 15 g  15 g Oral PRN Toshia Silva MD        dextrose 50 % IV solution  12.5 g Intravenous PRN Toshia Silva MD        glucagon (rDNA) injection 1 mg  1 mg Intramuscular PRN Toshia Silva MD        dextrose 5 % solution  100 mL/hr Intravenous PRN Toshia Silva MD        0.9 % sodium chloride infusion   Intravenous Continuous José Luis Izquierdo MD        sodium chloride flush 0.9 % injection 5-40 mL  5-40 mL Intravenous 2 times per day Toshia Silva MD        sodium chloride flush 0.9 % injection 5-40 mL  5-40 mL Intravenous PRN Toshia Silva MD        0.9 % sodium chloride infusion  25 mL Intravenous PRN Toshia Silva MD        potassium chloride (KLOR-CON M) extended release tablet 40 mEq  40 mEq Oral PRN Toshia Silva MD        Or    potassium bicarb-citric acid (EFFER-K) effervescent tablet 40 mEq  40 mEq Oral PRN José Luis Izquierdo MD        Or    potassium chloride 10 mEq/100 mL IVPB (Peripheral Line)  10 mEq Intravenous PRN Toshia Silva MD        magnesium sulfate 2000 mg in 50 mL IVPB premix  2,000 mg Intravenous PRN José Luis Izquierdo MD        promethazine (PHENERGAN) tablet 12.5 mg  12.5 mg Oral Q6H PRN José Luis Izquierdo MD        Or    ondansetron (ZOFRAN) injection 4 mg  4 mg Intravenous Q6H PRN José Luis Izquierdo MD        polyethylene glycol (GLYCOLAX) packet 17 g  17 g Oral Daily PRN José Luis Izquierdo MD        fleet rectal enema 1 enema  1 enema Rectal Daily PRN José Luis Izquierdo MD        nicotine (NICODERM CQ) 21 MG/24HR 1 patch  1 patch Transdermal Daily PRN José Luis Izquierdo MD        acetaminophen (TYLENOL) tablet 650 mg  650 mg Oral Q6H PRN José Luis Izquierdo MD        Or    acetaminophen (TYLENOL) suppository 650 mg  650 mg Rectal Q6H PRN José Luis Izquierdo MD        enoxaparin (LOVENOX) injection 30 mg  30 mg Subcutaneous Daily José Luis Izquierdo MD        zolpidem (AMBIEN) tablet 5 mg  5 mg Oral Nightly PRN Toshia Silva MD  oxyCODONE-acetaminophen (PERCOCET) 5-325 MG per tablet 1 tablet  1 tablet Oral Q8H PRN MD Karmen HarrisaiFENesin-dextromethorphan (ROBITUSSIN DM) 100-10 MG/5ML syrup 10 mL  10 mL Oral Q6H PRN José Luis Izquierdo MD        benzonatate (TESSALON) capsule 200 mg  200 mg Oral TID PRN José Luis Izquierdo MD        guaiFENesin (MUCINEX) extended release tablet 1,200 mg  1,200 mg Oral BID José Luis Izquierdo MD        calcium carbonate (TUMS) chewable tablet 750 mg  750 mg Oral TID PRN José Luis Izquierdo MD        tiZANidine (ZANAFLEX) tablet 4 mg  4 mg Oral Q8H PRN José Luis Izquierdo MD        morphine (PF) injection 2 mg  2 mg Intravenous Q4H PRN José Lusi Izquierdo MD        cefTRIAXone (ROCEPHIN) 1000 mg IVPB in 50 mL D5W minibag  1,000 mg Intravenous Q24H Ulises Higuera MD        vancomycin (VANCOCIN) intermittent dosing (placeholder)   Other See Admin Instructions Ulises Higuera MD         Review of Systems:   Review of Systems   Constitutional: Positive for fatigue. HENT: Negative for congestion. Respiratory: Positive for shortness of breath. Negative for chest tightness and wheezing. Cardiovascular: Positive for chest pain. Negative for palpitations and leg swelling. Gastrointestinal: Negative for blood in stool. Genitourinary: Positive for scrotal swelling and testicular pain. Musculoskeletal: Positive for back pain. Skin: Positive for wound. Neurological: Positive for dizziness and light-headedness. Negative for syncope. Physical Examination:    Vitals:    05/13/21 0043 05/13/21 0317 05/13/21 0532 05/13/21 0745   BP: 96/74 125/72 99/63 138/74   Pulse: 95 88  97   Resp: 16 18  20   Temp: 99.4 °F (37.4 °C) 98.7 °F (37.1 °C)  99.5 °F (37.5 °C)   TempSrc: Oral   Oral   SpO2: 99% 100%  96%       General Appearance: conversant  Constitutional:  Well developed, Well nourished, No acute distress, Non-toxic appearance.    HENT:  Normocephalic, Atraumatic, Bilateral external ears normal, Oropharynx moist, No oral exudates, Nose normal.   Neck- trachea is midline    Lymphatics: no palpable lymph nodes  Eyes:  PERRL, Conjunctiva normal, No discharge. Respiratory:  Normal breath sounds, No respiratory distress, No wheezing, No chest tenderness, no use of accessory muscles  Cardiovascular: (PMI):  RRR, S1 and S2 audible, no murmur appreciated, JVD not noted,  apex non displaced, no lifts, no thrills, no edema to lower legs   Abdomen /GI: Soft, + tenderness, No masses, No gross visceromegaly   : Scrotal red and swollen  Musculoskeletal:  no edema, no tenderness  Integument:  Ulcer to ball of right foot   Lymphatic:  No lymphadenopathy noted   Neurologic:  Alert & oriented x 3, CN 2-12 grossly normal      Medical decision making and Data review:    Lab Review   Recent Labs     05/13/21 0058   WBC 16.0*   HGB 9.9*   HCT 29.0*         Recent Labs     05/13/21 0058   *   K 4.0   CL 99   CO2 21   BUN 33*   CREATININE 2.8*     No results for input(s): AST, ALT, ALB, BILIDIR, BILITOT, ALKPHOS in the last 72 hours. Recent Labs     05/13/21 0058   TROPONINT 0.015*       No results for input(s): PROBNP in the last 72 hours. Lab Results   Component Value Date    INR 1.02 03/22/2019    PROTIME 11.8 03/22/2019       EKG: Sinus rhythm rate 93 no acute ST or T wave abnormalities    ECHO: 2019     Left ventricular systolic function is normal.   Ejection fraction is visually estimated at 55-60%. Moderate concentric left ventricular hypertrophy. mildly enlarged right ventricle cavity size . Mild tricuspid regurgitation. RVSP is 23 mmHg. No evidence of pericardial effusion. Chest Xray:  Us Scrotum And Testicles    Result Date: 5/13/2021  EXAMINATION: ULTRASOUND OF THE SCROTUM/TESTICLES WITH COLOR DOPPLER FLOW EVALUATION; DOPPLER EVALUATION OF THE PELVIS 5/13/2021 COMPARISON: None.  HISTORY: ORDERING SYSTEM PROVIDED HISTORY: Right-sided testicular swelling TECHNOLOGIST PROVIDED HISTORY: Reason for exam:->Right-sided testicular swelling Reason for Exam: Rt pain and swelling Acuity: Acute Type of Exam: Initial; ORDERING SYSTEM PROVIDED HISTORY: Rt swelling TECHNOLOGIST PROVIDED HISTORY: Reason for exam:->Rt swelling Reason for Exam: Rt pain and swelling Acuity: Acute Type of Exam: Initial FINDINGS: Measurements: Right testicle: 2.9 x 3.1 x 3.7 cm Left testicle: 2.5 x 3.0 x 3.8 cm Right: Grey scale: The right testicle demonstrates normal homogeneous echotexture without focal lesion. No evidence of testicular microlithiasis. Doppler Evaluation:  There is normal arterial and venous Doppler flow within the testicle. Scrotal Sac:  No evidence of hydrocele. Scrotal wall edema is noted. Epididymis: The epididymis is enlarged and heterogeneous but does not demonstrate increased flow. Left: Grey scale: The left testicle demonstrates normal homogeneous echotexture without focal lesion. No evidence of testicular microlithiasis. Doppler Evaluation:  There is normal arterial and venous Doppler flow within the testicle. Scrotal Sac: There is a trace hydrocele. Scrotal wall edema is noted. Epididymis: The left epididymis could not be identified. 1. Normal flow in both testicles. 2. Appearance of the right epididymis suggests prior epididymitis. Us Dup Abd Pel Retro Scrot Complete    Result Date: 5/13/2021  EXAMINATION: ULTRASOUND OF THE SCROTUM/TESTICLES WITH COLOR DOPPLER FLOW EVALUATION; DOPPLER EVALUATION OF THE PELVIS 5/13/2021 COMPARISON: None.  HISTORY: ORDERING SYSTEM PROVIDED HISTORY: Right-sided testicular swelling TECHNOLOGIST PROVIDED HISTORY: Reason for exam:->Right-sided testicular swelling Reason for Exam: Rt pain and swelling Acuity: Acute Type of Exam: Initial; ORDERING SYSTEM PROVIDED HISTORY: Rt swelling TECHNOLOGIST PROVIDED HISTORY: Reason for exam:->Rt swelling Reason for Exam: Rt pain and swelling Acuity: Acute Type of Exam: Initial FINDINGS: Measurements: Right testicle: 2.9 x 3.1 x 3.7 cm Left testicle: 2.5 x 3.0 x 3.8 cm Right: Grey scale: The right testicle demonstrates normal homogeneous echotexture without focal lesion. No evidence of testicular microlithiasis. Doppler Evaluation:  There is normal arterial and venous Doppler flow within the testicle. Scrotal Sac:  No evidence of hydrocele. Scrotal wall edema is noted. Epididymis: The epididymis is enlarged and heterogeneous but does not demonstrate increased flow. Left: Grey scale: The left testicle demonstrates normal homogeneous echotexture without focal lesion. No evidence of testicular microlithiasis. Doppler Evaluation:  There is normal arterial and venous Doppler flow within the testicle. Scrotal Sac: There is a trace hydrocele. Scrotal wall edema is noted. Epididymis: The left epididymis could not be identified. 1. Normal flow in both testicles. 2. Appearance of the right epididymis suggests prior epididymitis. All labs, medications and tests reviewed by myself including data  from outside source , patient and available family . Continue all other medications of all above medical condition listed as is. Impression:  Active Problems:    Cellulitis, scrotum  Resolved Problems:    * No resolved hospital problems. *      Assessment: 39 y. o.year old with PMH of  has a past medical history of Abscess, Acute renal failure (ARF) ([address] Anxiety associated with depression, Anxiety associated with depression, Back pain, Chest pain, Diabetic nephropathy ([address] Diabetic neuropathy ([address] Diabetic ulcer of left midfoot associated with type 2 diabetes mellitus, with fat layer exposed ([address] Diverticulosis, DM (diabetes mellitus), type 2 ([address] Hyperlipidemia LDL goal < 100, Hypertension, Internal hemorrhoid, Nose fracture, Panic attacks, Pericarditis, Peripheral autonomic neuropathy due to diabetes mellitus ([address] Sebaceous cyst, URI (upper respiratory infection), WD-Wound, surgical, infected, initial encounter, and Wrist fracture. Plan and Recommendations:    1. Elevated Troponin : in setting of renal failure, sepsis and acute infection, doubt ACS, most probably demand ischemia related leak. Will check echocardiogram.  Can consider stress test for risk stratification due to multiple risk factors - continue asa   2. Dizziness : check orthostatics   3. Check lipids   4. cellulitis of the scrotum- per primary   5. CKD stage 4  - recommend nephrology consult       Thank you  much for consult and giving us the opportunity in contributing in the care of this patient. Please feel free to call me for any questions.        Lauren Elias, APPLE - CNP, 5/13/2021 9:13 AM

## 2021-05-13 NOTE — PROGRESS NOTES
[address] [facility]  consulted by Dr. Karl Shane for monitoring and adjustment. Indication for treatment: SSTI  Goal trough: 10-15 mcg/mL  AUC/PAMELA: <500    Pertinent Laboratory Values:   Temp Readings from Last 3 Encounters:   05/13/21 99.5 °F (37.5 °C) (Oral)   04/04/21 98.1 °F (36.7 °C) (Oral)   03/29/21 98.8 °F (37.1 °C) (Oral)     Recent Labs     05/13/21  0058 05/13/21  0440   WBC 16.0*  --    LACTATE  --  1.5     Recent Labs     05/13/21  0058   BUN 33*   CREATININE 2.8*     CrCl cannot be calculated (Unknown ideal weight.). No intake or output data in the 24 hours ending 05/13/21 0849    Pertinent Cultures:  Date    Source    Results  5/13   Blood    Ordered    Vancomycin level:   TROUGH:  No results for input(s): VANCOTROUGH in the last 72 hours. RANDOM:  No results for input(s): VANCORANDOM in the last 72 hours.     Assessment:  · WBC and temperature: WBC @ 16; Tmax =99.5F  · SCr, BUN, and urine output: baseline Scr is elevated, appears in CARMEN  · Day(s) of therapy: #1  · Vancomycin concentration: to be collected    Plan:  · Vancomycin 2000 mg IVPB x 1 dose in ER  · Intermittent vancomycin dosing based on levels while in CARMEN  · Plan to check a random level tomorrow AM and re-dose if appropriate  · Pharmacy will continue to monitor patient and adjust therapy as indicated    VANCOMYCIN CONCENTRATION SCHEDULED FOR 5/14 @ 0600    Thank you for the consult,  Raymond Shrestha, [address]   5/13/2021 8:49 AM

## 2021-05-13 NOTE — CONSULTS
CARDIOLOGY CONSULT NOTE   Reason for consultation: Abnormal troponin level    Referring physician:  Abiola Truong MD     Primary care physician: Abiola Truong MD      Dear  Dr. Abiola Truong MD   Thanks for the consult. Chief Complaints :  Chief Complaint   Patient presents with    Groin Swelling     right testicular swelling    Shortness of Breath        History of present illness:Yovanny is a 39 y. o.year old who presents with significant scrotal swelling he was recently treated for cellulitis of leg he has uncontrolled diabetes complains of feeling dizzy lightheaded short of breath when he came in. He reports room spinning. Has no chest pain cardiology was consulted due to abnormal troponin levels. He had normal cardiac cath few years ago. Currently is chest pain-free    Past medical history:    has a past medical history of Abscess, Acute renal failure (ARF) ([address] Anxiety associated with depression, Anxiety associated with depression, Back pain, Chest pain, Diabetic nephropathy ([address] Diabetic neuropathy ([address] Diabetic ulcer of left midfoot associated with type 2 diabetes mellitus, with fat layer exposed ([address] Diverticulosis, DM (diabetes mellitus), type 2 ([address] Hyperlipidemia LDL goal < 100, Hypertension, Internal hemorrhoid, Nose fracture, Panic attacks, Pericarditis, Peripheral autonomic neuropathy due to diabetes mellitus ([address] Sebaceous cyst, URI (upper respiratory infection), WD-Wound, surgical, infected, initial encounter, and Wrist fracture. Past surgical history:   has a past surgical history that includes Cardiac catheterization (2003, 2013); Tonsillectomy and adenoidectomy (1988); Upper gastrointestinal endoscopy (06/2/2011); Colonoscopy (6/2/2011); Nose surgery (1993); skin biopsy (N/A, [id number]); other surgical history (Right, 05/22/2015); Toe amputation; Abdomen surgery; other surgical history (12/04/2017); pr deep incis foot bone infectn (Left, 9/22/2018);  Upper 1,200 mg, BID  calcium carbonate (TUMS) chewable tablet 750 mg, TID PRN  tiZANidine (ZANAFLEX) tablet 4 mg, Q8H PRN  cefTRIAXone (ROCEPHIN) 1000 mg IVPB in 50 mL D5W minibag, Q24H  vancomycin (VANCOCIN) intermittent dosing (placeholder), See Admin Instructions  insulin glargine (LANTUS) injection vial 60 Units, Nightly  oxyCODONE-acetaminophen (PERCOCET) 5-325 MG per tablet 1 tablet, Q4H PRN  morphine sulfate (PF) injection 4 mg, Q3H PRN      Current Facility-Administered Medications   Medication Dose Route Frequency Provider Last Rate Last Admin    ondansetron (ZOFRAN-ODT) disintegrating tablet 4 mg  4 mg Oral Q8H PRN José Luis Izquierdo MD        insulin lispro (HUMALOG) injection vial 25 Units  25 Units Subcutaneous TID WC José Luis Izquierdo MD   25 Units at 05/13/21 1201    aspirin EC tablet 81 mg  81 mg Oral Daily José Luis Izquierdo MD        glucose (GLUTOSE) 40 % oral gel 15 g  15 g Oral PRN José Luis Izquierdo MD        dextrose 50 % IV solution  12.5 g Intravenous PRN José Luis Izquierdo MD        glucagon (rDNA) injection 1 mg  1 mg Intramuscular PRN José Luis Izquierdo MD        dextrose 5 % solution  100 mL/hr Intravenous PRN Brooke Lennox, MD        0.9 % sodium chloride infusion   Intravenous Continuous José Luis Izquierdo MD 75 mL/hr at 05/13/21 0931 New Bag at 05/13/21 0931    sodium chloride flush 0.9 % injection 5-40 mL  5-40 mL Intravenous 2 times per day Brooke Lennox, MD   10 mL at 05/13/21 0931    sodium chloride flush 0.9 % injection 5-40 mL  5-40 mL Intravenous PRN José Luis Izquierdo MD        0.9 % sodium chloride infusion  25 mL Intravenous PRN José Luis Izquierdo MD        potassium chloride (KLOR-CON M) extended release tablet 40 mEq  40 mEq Oral PRN José Luis Izquierdo MD        Or    potassium bicarb-citric acid (EFFER-K) effervescent tablet 40 mEq  40 mEq Oral PRN José Luis Izquierdo MD        Or    potassium chloride 10 mEq/100 mL IVPB (Peripheral Line)  10 mEq Intravenous PRN Kiera Kincaid MD        morphine sulfate (PF) injection 4 mg  4 mg Intravenous Q3H PRN Caridad Owens MD   4 mg at 05/13/21 1722     Review of Systems:   · Constitutional: No Fever or Weight Loss   · Eyes: No Decreased Vision  · ENT: No Headaches, Hearing Loss or Vertigo  · Cardiovascular: As per HPI  · Respiratory: As per HPI  · Gastrointestinal: No abdominal pain, appetite loss, blood in stools, constipation, diarrhea or heartburn  · Genitourinary: No dysuria, trouble voiding, or hematuria  · Musculoskeletal:  No gait disturbance, weakness or joint complaints  · Integumentary: No rash or pruritis  · Neurological: No TIA or stroke symptoms  · Psychiatric: No anxiety or depression  · Endocrine: No malaise, fatigue or temperature intolerance  · Hematologic/Lymphatic: No bleeding problems, blood clots or swollen lymph nodes  · Allergic/Immunologic: No nasal congestion or hives  All systems negative except as marked. Physical Examination:    Vitals:    05/13/21 0317 05/13/21 0532 05/13/21 0745 05/13/21 1440   BP: 125/72 99/63 138/74 (!) 142/86   Pulse: 88  97 94   Resp: 18  20 20   Temp: 98.7 °F (37.1 °C)  99.5 °F (37.5 °C) 98.7 °F (37.1 °C)   TempSrc:   Oral Oral   SpO2: 100%  96% 98%       General Appearance:  No distress, conversant    Constitutional:  Well developed, Well nourished, No acute distress, Non-toxic appearance. HENT:  Normocephalic, Atraumatic, Bilateral external ears normal, Oropharynx moist, No oral exudates, Nose normal. Neck- Normal range of motion, No tenderness, Supple, No stridor,no apical-carotid delay  Lymphatics : no palpable lymph nodes  Eyes:  PERRL, EOMI, Conjunctiva normal, No discharge. Respiratory:  Normal breath sounds, No respiratory distress, No wheezing, No chest tenderness. ,no use of accessory muscles, crackles Absent   Cardiovascular: (PMI) apex non displaced,no lifts no thrills, ankle swelling Absent  , 1+, s1 and s2 audible,Murmur. Absent , JVD not noted    Abdomen /GI: Bowel sounds normal, Soft, No tenderness, No masses, No gross visceromegaly   :  No costovertebral angle tenderness   Musculoskeletal: Scrotal edema swelling, no deformities. Back- no tenderness  Integument:  Well hydrated, no rash   Lymphatic:  No lymphadenopathy noted   Neurologic:  Alert & oriented x 3, CN 2-12 normal, normal motor function, normal sensory function, no focal deficits noted           Medical decision making and Data review:    Lab Review   Recent Labs     05/13/21  0058   WBC 16.0*   HGB 9.9*   HCT 29.0*         Recent Labs     05/13/21  0058   *   K 4.0   CL 99   CO2 21   BUN 33*   CREATININE 2.8*     No results for input(s): AST, ALT, ALB, BILIDIR, BILITOT, ALKPHOS in the last 72 hours. Recent Labs     05/13/21  0058 05/13/21  1028 05/13/21  1655   TROPONINT 0.015* <0.010 0.012*       No results for input(s): PROBNP in the last 72 hours. Lab Results   Component Value Date    INR 1.02 03/22/2019    PROTIME 11.8 03/22/2019       EKG: (reviewed by myself)    ECHO:(reviewed by myself)    Chest Xray:(reviewed by myself)  Echocardiogram Complete 2d With Doppler With Color    Result Date: 5/13/2021  Transthoracic Echocardiography Report (TTE)  Demographics   Patient Name       Lexy Thorne     Date of Study       05/13/2021   Date of Birth      1975         Gender              Male   Age                39 year(s)         Race                   Patient Number     [id number]         Room Number         1130   Visit Number       [id number]   Corporate ID       [id number]   Accession Number   [id number]         [address] [place] [address]   Ordering Physician Lalitha CHIU-CNP           Physician           MD  Procedure Type of Study   TTE procedure:ECHOCARDIOGRAM COMPLETE 2D W DOPPLER W COLOR.   Procedure Date Date: 05/13/2021 Initial FINDINGS: Measurements: Right testicle: 2.9 x 3.1 x 3.7 cm Left testicle: 2.5 x 3.0 x 3.8 cm Right: Grey scale: The right testicle demonstrates normal homogeneous echotexture without focal lesion. No evidence of testicular microlithiasis. Doppler Evaluation:  There is normal arterial and venous Doppler flow within the testicle. Scrotal Sac:  No evidence of hydrocele. Scrotal wall edema is noted. Epididymis: The epididymis is enlarged and heterogeneous but does not demonstrate increased flow. Left: Grey scale: The left testicle demonstrates normal homogeneous echotexture without focal lesion. No evidence of testicular microlithiasis. Doppler Evaluation:  There is normal arterial and venous Doppler flow within the testicle. Scrotal Sac: There is a trace hydrocele. Scrotal wall edema is noted. Epididymis: The left epididymis could not be identified. 1. Normal flow in both testicles. 2. Appearance of the right epididymis suggests prior epididymitis. Us Dup Abd Pel Retro Scrot Complete    Result Date: 5/13/2021  EXAMINATION: ULTRASOUND OF THE SCROTUM/TESTICLES WITH COLOR DOPPLER FLOW EVALUATION; DOPPLER EVALUATION OF THE PELVIS 5/13/2021 COMPARISON: None. HISTORY: ORDERING SYSTEM PROVIDED HISTORY: Right-sided testicular swelling TECHNOLOGIST PROVIDED HISTORY: Reason for exam:->Right-sided testicular swelling Reason for Exam: Rt pain and swelling Acuity: Acute Type of Exam: Initial; ORDERING SYSTEM PROVIDED HISTORY: Rt swelling TECHNOLOGIST PROVIDED HISTORY: Reason for exam:->Rt swelling Reason for Exam: Rt pain and swelling Acuity: Acute Type of Exam: Initial FINDINGS: Measurements: Right testicle: 2.9 x 3.1 x 3.7 cm Left testicle: 2.5 x 3.0 x 3.8 cm Right: Grey scale: The right testicle demonstrates normal homogeneous echotexture without focal lesion. No evidence of testicular microlithiasis. Doppler Evaluation:  There is normal arterial and venous Doppler flow within the testicle.  Scrotal Sac:  No evidence of hydrocele. Scrotal wall edema is noted. Epididymis: The epididymis is enlarged and heterogeneous but does not demonstrate increased flow. Left: Grey scale: The left testicle demonstrates normal homogeneous echotexture without focal lesion. No evidence of testicular microlithiasis. Doppler Evaluation:  There is normal arterial and venous Doppler flow within the testicle. Scrotal Sac: There is a trace hydrocele. Scrotal wall edema is noted. Epididymis: The left epididymis could not be identified. 1. Normal flow in both testicles. 2. Appearance of the right epididymis suggests prior epididymitis. All labs, medications and tests reviewed by myself including data  from outside source , patient and available family . Continue all other medications of all above medical condition listed as is. Impression:  Active Problems:    Cellulitis, scrotum  Resolved Problems:    * No resolved hospital problems. *      Assessment: 39 y. o.year old with PMH of  has a past medical history of Abscess, Acute renal failure (ARF) ([address] Anxiety associated with depression, Anxiety associated with depression, Back pain, Chest pain, Diabetic nephropathy ([address] Diabetic neuropathy ([address] Diabetic ulcer of left midfoot associated with type 2 diabetes mellitus, with fat layer exposed ([address] Diverticulosis, DM (diabetes mellitus), type 2 ([address] Hyperlipidemia LDL goal < 100, Hypertension, Internal hemorrhoid, Nose fracture, Panic attacks, Pericarditis, Peripheral autonomic neuropathy due to diabetes mellitus ([address] Sebaceous cyst, URI (upper respiratory infection), WD-Wound, surgical, infected, initial encounter, and Wrist fracture.       Plan and Recommendations:    Elevated Troponin :  in setting of renal failure  and acute infection, Doubt ACS most probably , No further work up at this time add aspirin 81 mg , can consider stress test for risk stratification due to multiple risk factors as outpatient would not consider doing test at this time during this hospitalization due to multiple active issues  Get echo  Renal failure as per primary team  Antibiotics as per primary team  DVT prophylaxis if no contraindication  6. Dyslipidemia: continue statins   Follow-up in office for stress test          Thank you  much for consult and giving us the opportunity in contributing in the care of this patient. Please feel free to call me for any questions.        Mary Ann Urban MD, 5/13/2021 6:28 PM

## 2021-05-13 NOTE — ED NOTES
Patient back in waiting room from [address] [place] [address] Rd[address].      Rigoberto Fine RN  05/13/21 [id number]

## 2021-05-13 NOTE — ED PROVIDER NOTES
Triage Chief Complaint:   Groin Swelling (right testicular swelling) and Shortness of Breath    [place]:  Today in the ED I had the pleasure of caring for Jackie Hays who is a 39 y.o. male that presents  emergency department complaints of 5 days worth of right-sided testicular swelling and pain. He also states over the last 2 days he has been experiencing increasing fatigue and lightheadedness. Patient had a recent hospitalization at the beginning of April for complicated cellulitis and was discharged with PICC line and IV antibiotics. He states that he finished the antibiotics just under 3 weeks ago. He has pain in the testicle describes a throbbing stabbing pain rated as moderate to severe in severity. No known trauma to the area. He has noticed any redness to the groin. He states that his blood sugar has been running in the 300s. Denies measured fevers but has experienced chills. Denies chest pain, nausea, vomiting. Endorses intermittent tightness in the chest over the last 1.5 days. No history of coronary artery disease. ROS:  REVIEW OF SYSTEMS    At least 10 systems reviewed      All other review of systems are negative  See HPI and nursing notes for additional information       Past Medical History:   Diagnosis Date    Abscess 2010    scrotal    Acute renal failure (ARF) ([address] 8/4/2019    Anxiety associated with depression     Anxiety associated with depression     Back pain 7/2/2012    Chest pain 5/1/2013    Diabetic nephropathy ([address]     Diabetic neuropathy ([address] 12/22/2011    Diabetic ulcer of left midfoot associated with type 2 diabetes mellitus, with fat layer exposed ([address] 7/18/2017    Diverticulosis     C scope + Dr. Asif Whipple DM (diabetes mellitus), type 2 ([address] 2002    DR. Turner podiatry    Hyperlipidemia LDL goal < 100 7/15/2013    Hypertension     Internal hemorrhoid     C scope + Dr. Asif Whipple Nose fracture 1988    Panic attacks     Pericarditis 2003    Hospitalized with s/p heart cath normal    Peripheral autonomic neuropathy due to diabetes mellitus ([facility]     Axonal EMG- NCS, March 2011    Sebaceous cyst 9/1/2011    URI (upper respiratory infection) 2/27/2012    WD-Wound, surgical, infected, initial encounter 12/8/2017    Wrist fracture 1986     Past Surgical History:   Procedure Laterality Date    ABDOMEN SURGERY      CARDIAC CATHETERIZATION  2003, 2013    Normal (Dr. Beata Jarrett)    COLONOSCOPY  6/2/2011    Pandiverticulosis, Nonbleeding internal hemorrhoids, Repeat colonoscopy at age 48- Dr. Kayla Higuera    \"had reconstruction surgery on nose a year after the other nose surgery\"    OTHER SURGICAL HISTORY Right 05/22/2015    I & D right great toe with partial amputation    OTHER SURGICAL HISTORY  12/04/2017    inc and drainage of abcess    MS DEEP INCIS FOOT BONE INFECTN Left 9/22/2018    LEFT FOOT DEBRIDEMENT INCISION AND DRAINAGE TOP AND BOTTOM performed by Susannah Zaidi DPM at [address] [place] [address] [phone]    sebaceous cyst removal times 4     TOE AMPUTATION      right great toe    TOE AMPUTATION Right 3/23/2019    TOE AMPUTATION RIGHT 5TH TOE performed by Susannah Zaidi DPM at [facility] TOE AMPUTATION Right 8/6/2019    TOE AMPUTATION RIGHT 4TH TOE performed by Susannah Zaidi DPM at [facility] [address] ENDOSCOPY  06/2/2011    Mild gastritis with moderatley severe antritis, small hiatal hernia, Dr. Tulio Bansal N/A 1/12/2019    EGD BIOPSY performed by Wendy Nuñez MD at [facility] History   Problem Relation Age of Onset    Cancer Mother         ?site   [facility] Stroke Mother     Bleeding Prob Mother     Diabetes Father     Heart Disease Father     High Blood Pressure Father     Obesity Father     Kidney Disease Father     Diabetes Sister     High Blood Pressure Sister     Mental Illness Sister     Obesity Sister     High Blood Pressure Other     Mental Illness Other         bipolar    Other Son         cyst in ear canal    ADHD Daughter      Social History     Socioeconomic History    Marital status:      Spouse name: Not on file    Number of children: Not on file    Years of education: Not on file    Highest education level: Not on file   Occupational History    Not on file   Social Needs    Financial resource strain: Not on file    Food insecurity     Worry: Not on file     Inability: Not on file    Transportation needs     Medical: Not on file     Non-medical: Not on file   Tobacco Use    Smoking status: Former Smoker     Years: 20.00     Quit date: 11/18/2017     Years since quitting: 3.4    Smokeless tobacco: Never Used   Substance and Sexual Activity    Alcohol use: Yes     Comment: occ    Drug use: Yes     Frequency: 7.0 times per week     Types: Marijuana    Sexual activity: Yes     Partners: Female   Lifestyle    Physical activity     Days per week: Not on file     Minutes per session: Not on file    Stress: Not on file   Relationships    Social connections     Talks on phone: Not on file     Gets together: Not on file     Attends Confucianist service: Not on file     Active member of club or organization: Not on file     Attends meetings of clubs or organizations: Not on file     Relationship status: Not on file    Intimate partner violence     Fear of current or ex partner: Not on file     Emotionally abused: Not on file     Physically abused: Not on file     Forced sexual activity: Not on file   Other Topics Concern    Not on file   Social History Narrative    Not on file     Current Facility-Administered Medications   Medication Dose Route Frequency Provider Last Rate Last Admin    0.9 % sodium chloride bolus  1,000 mL Intravenous Once [place] Goldstein PA-C 1,000 mL/hr at 05/13/21 0446 1,000 mL at 05/13/21 0446    vancomycin (VANCOCIN) 2,000 mg in dextrose 5 % 500 mL IVPB  2,000 mg Intravenous Once Rigo Huang PA-C 250 mL/hr at 05/13/21 0446 2,000 mg at 05/13/21 0446    morphine sulfate (PF) injection 4 mg  4 mg Intravenous Q30 Min PRN Rigo Huang PA-C   4 mg at 05/13/21 1720     Current Outpatient Medications   Medication Sig Dispense Refill    aspirin 81 MG EC tablet Take 1 tablet by mouth daily 30 tablet 3    insulin glargine (LANTUS) 100 UNIT/ML injection vial Inject 40 Units into the skin nightly 1 vial 5    insulin lispro (HUMALOG) 100 UNIT/ML injection vial Inject 25 Units into the skin 3 times daily (with meals) 1 vial 5    linagliptin (TRADJENTA) 5 MG tablet Take 1 tablet by mouth daily 30 tablet 5    ondansetron (ZOFRAN) 4 MG tablet Take 1 tablet by mouth every 8 hours as needed for Nausea or Vomiting 20 tablet 0    Lancets MISC 1 each by Does not apply route daily 100 each 3    glucose monitoring kit (FREESTYLE) monitoring kit 1 each by Does not apply route once for 1 dose. 1 kit 0     Allergies   Allergen Reactions    Adhesive Tape Rash    Doxycycline Nausea And Vomiting    Reglan [Metoclopramide] Anxiety       Nursing Notes Reviewed    Physical Exam:  ED Triage Vitals [05/13/21 0043]   Enc Vitals Group      BP 96/74      Pulse 95      Resp 16      Temp 99.4 °F (37.4 °C)      Temp Source Oral      SpO2 99 %      Weight       Height       Head Circumference       Peak Flow       Pain Score       Pain Loc       Pain Edu? Excl. in [address]? General :Patient is awake alert oriented person place and time no acute distress nontoxic appearing  HEENT: Pupils are equally round and reactive to light extraocular motors are intact conjunctivae clear sclerae white there is no injection no icterus. Nose without any rhinorrhea or epistaxis. Oral mucosa is moist no exudate buccal mucosa shows no ulcerations.  Uvula is midline    Neck: Neck is supple full range of motion trachea midline thyroid nonpalpable  Cardiac: Heart regular rate rhythm no murmurs rubs clicks or Eosinophils % 0.7 0 - 3 %    Basophils % 0.2 0 - 1 %    Segs Absolute 13.7 K/CU MM    Lymphocytes Absolute 0.9 K/CU MM    Monocytes Absolute 1.0 K/CU MM    Eosinophils Absolute 0.1 K/CU MM    Basophils Absolute 0.0 K/CU MM    Nucleated RBC % 0.0 %    Total Nucleated RBC 0.0 K/CU MM    Total Immature Neutrophil 0.31 K/CU MM    Immature Neutrophil % 1.9 (H) 0 - 0.43 %   BMP   Result Value Ref Range    Sodium 129 (L) 135 - 145 MMOL/L    Potassium 4.0 3.5 - 5.1 MMOL/L    Chloride 99 99 - 110 mMol/L    CO2 21 21 - 32 MMOL/L    Anion Gap 9 4 - 16    BUN 33 (H) 6 - 23 MG/DL    CREATININE 2.8 (H) 0.9 - 1.3 MG/DL    Glucose 321 (H) 70 - 99 MG/DL    Calcium 8.3 8.3 - 10.6 MG/DL    GFR Non- 25 (L) >60 mL/min/1.73m2    GFR  30 (L) >60 mL/min/1.73m2   Troponin   Result Value Ref Range    Troponin T 0.015 (H) <0.01 NG/ML   EKG 12 Lead   Result Value Ref Range    Ventricular Rate 93 BPM    Atrial Rate 93 BPM    P-R Interval 158 ms    QRS Duration 82 ms    Q-T Interval 354 ms    QTc Calculation (Bazett) 440 ms    P Axis 27 degrees    R Axis 9 degrees    T Axis 58 degrees    Diagnosis       Normal sinus rhythm  Normal ECG  When compared with ECG of 22-MAR-2021 15:45,  No significant change was found        Radiographs (if obtained):  [] The following radiograph was interpreted by myself in the absence of a radiologist:   [] Radiologist's Report Reviewed:  US SCROTUM AND TESTICLES   Final Result   1. Normal flow in both testicles. 2. Appearance of the right epididymis suggests prior epididymitis. US DUP ABD PEL RETRO SCROT COMPLETE   Final Result   1. Normal flow in both testicles. 2. Appearance of the right epididymis suggests prior epididymitis. EKG (if obtained):   Please See Note of attending physician for EKG interpretation.      Chart review shows recent radiograph(s):  Us Scrotum And Testicles    Result Date: 5/13/2021  EXAMINATION: ULTRASOUND OF THE SCROTUM/TESTICLES WITH COLOR DOPPLER FLOW EVALUATION; DOPPLER EVALUATION OF THE PELVIS 5/13/2021 COMPARISON: None. HISTORY: ORDERING SYSTEM PROVIDED HISTORY: Right-sided testicular swelling TECHNOLOGIST PROVIDED HISTORY: Reason for exam:->Right-sided testicular swelling Reason for Exam: Rt pain and swelling Acuity: Acute Type of Exam: Initial; ORDERING SYSTEM PROVIDED HISTORY: Rt swelling TECHNOLOGIST PROVIDED HISTORY: Reason for exam:->Rt swelling Reason for Exam: Rt pain and swelling Acuity: Acute Type of Exam: Initial FINDINGS: Measurements: Right testicle: 2.9 x 3.1 x 3.7 cm Left testicle: 2.5 x 3.0 x 3.8 cm Right: Grey scale: The right testicle demonstrates normal homogeneous echotexture without focal lesion. No evidence of testicular microlithiasis. Doppler Evaluation:  There is normal arterial and venous Doppler flow within the testicle. Scrotal Sac:  No evidence of hydrocele. Scrotal wall edema is noted. Epididymis: The epididymis is enlarged and heterogeneous but does not demonstrate increased flow. Left: Grey scale: The left testicle demonstrates normal homogeneous echotexture without focal lesion. No evidence of testicular microlithiasis. Doppler Evaluation:  There is normal arterial and venous Doppler flow within the testicle. Scrotal Sac: There is a trace hydrocele. Scrotal wall edema is noted. Epididymis: The left epididymis could not be identified. 1. Normal flow in both testicles. 2. Appearance of the right epididymis suggests prior epididymitis. Us Dup Abd Pel Retro Scrot Complete    Result Date: 5/13/2021  EXAMINATION: ULTRASOUND OF THE SCROTUM/TESTICLES WITH COLOR DOPPLER FLOW EVALUATION; DOPPLER EVALUATION OF THE PELVIS 5/13/2021 COMPARISON: None.  HISTORY: ORDERING SYSTEM PROVIDED HISTORY: Right-sided testicular swelling TECHNOLOGIST PROVIDED HISTORY: Reason for exam:->Right-sided testicular swelling Reason for Exam: Rt pain and swelling Acuity: Acute Type of Exam: Initial; ORDERING SYSTEM PROVIDED HISTORY: Rt swelling TECHNOLOGIST PROVIDED HISTORY: Reason for exam:->Rt swelling Reason for Exam: Rt pain and swelling Acuity: Acute Type of Exam: Initial FINDINGS: Measurements: Right testicle: 2.9 x 3.1 x 3.7 cm Left testicle: 2.5 x 3.0 x 3.8 cm Right: Grey scale: The right testicle demonstrates normal homogeneous echotexture without focal lesion. No evidence of testicular microlithiasis. Doppler Evaluation:  There is normal arterial and venous Doppler flow within the testicle. Scrotal Sac:  No evidence of hydrocele. Scrotal wall edema is noted. Epididymis: The epididymis is enlarged and heterogeneous but does not demonstrate increased flow. Left: Grey scale: The left testicle demonstrates normal homogeneous echotexture without focal lesion. No evidence of testicular microlithiasis. Doppler Evaluation:  There is normal arterial and venous Doppler flow within the testicle. Scrotal Sac: There is a trace hydrocele. Scrotal wall edema is noted. Epididymis: The left epididymis could not be identified. 1. Normal flow in both testicles. 2. Appearance of the right epididymis suggests prior epididymitis. MDM:     Interventions given this visit:   Orders Placed This Encounter   Medications    0.9 % sodium chloride bolus    aspirin chewable tablet 324 mg    vancomycin (VANCOCIN) 2,000 mg in dextrose 5 % 500 mL IVPB     Order Specific Question:   Antimicrobial Indications     Answer:   Skin and Soft Tissue Infection    morphine sulfate (PF) injection 4 mg     Appearing patient presents today to the emergency department with testicular pain. Physical examination is consistent with scrotal cellulitis. No evidence of Irene's gangrene or obvious abscess. However considering patient is a diabetic. He does meet sepsis criteria. With a leukocytosis. Considering patient is high risk. He is provided with IV antibiotics. IV fluids.   As he does have CARMEN noted on his metabolic panel require admission to the hospital  On-call physician  Was consulted regarding patient. After thorough discussion regarding patient's history, physical exam.  laboratory values, radiographic evidence (if applicable  theymyself as well as my attending physician agreed Given the patient's presenting concerns, medical history and clinical findings, the patient will be admitted at this time to undergo further evaluation and disposition. . During patient's entire stay in the ED patient remained stable and comfortable. Analgesia is well-controlled. Patient will be admitted for all information regarding ongoing management and care of patient please see note of Admitting physician. All EKG interpretations are performed by Attending physician           I independently managed patient today in the ED. /72   Pulse 88   Temp 98.7 °F (37.1 °C)   Resp 18   SpO2 100%       Clinical Impression:  1. Acute epididymitis    2. Cellulitis of scrotum    3. Leukocytosis, unspecified type    4. CARMEN (acute kidney injury) ([facility]        Disposition referral (if applicable):  No follow-up provider specified. Disposition medications (if applicable):  New Prescriptions    No medications on file         Comment: Please note this report has been produced using speech recognition software and may contain errors related to that system including errors in grammar, punctuation, and spelling, as well as words and phrases that may be inappropriate. If there are any questions or concerns please feel free to contact the dictating provider for clarification.       Rigo Huang, [id number] [place] [address]  05/13/21 1045

## 2021-05-13 NOTE — CONSULTS
[address] [place] [address] Continence Nurse  Consult Note       Tess Wilson  AGE: 39 y.o. GENDER: male  : 1975  TODAY'S DATE:  2021    Subjective:     Reason for Evaluation and Assessment: wound care assessment. Tess Wilson is a 39 y.o. male referred by:   [x] Physician  [] Nursing  [] Other:     Wound Identification:  Wound Type: diabetic and pressure  Contributing Factors: diabetes and chronic pressure        PAST MEDICAL HISTORY        Diagnosis Date    Abscess 2010    scrotal    Acute renal failure (ARF) ([address] 2019    Anxiety associated with depression     Anxiety associated with depression     Back pain 2012    Chest pain 2013    Diabetic nephropathy ([address]     Diabetic neuropathy ([address] 2011    Diabetic ulcer of left midfoot associated with type 2 diabetes mellitus, with fat layer exposed ([address] 2017    Diverticulosis     C scope + Dr. Javy Henry DM (diabetes mellitus), type 2 ([address]     DR. Turner podiatry    Hyperlipidemia LDL goal < 100 7/15/2013    Hypertension     Internal hemorrhoid     C scope + Dr. Hamlin Revel fracture 1988    Panic attacks     Pericarditis     Hospitalized with s/p heart cath normal    Peripheral autonomic neuropathy due to diabetes mellitus ([address]     Axonal EMG- NCS, 2011    Sebaceous cyst 2011    URI (upper respiratory infection) 2012    WD-Wound, surgical, infected, initial encounter 2017    Wrist fracture 1986       PAST SURGICAL HISTORY    Past Surgical History:   Procedure Laterality Date   [address] 2013    Normal (Dr. Devon Bain)    COLONOSCOPY  2011    Pandiverticulosis, Nonbleeding internal hemorrhoids, Repeat colonoscopy at age 48- Dr. Matteo Sheppard    \"had reconstruction surgery on nose a year after the other nose surgery\"    OTHER SURGICAL HISTORY Right 2015    I & D right great toe with partial amputation    OTHER SURGICAL HISTORY  12/04/2017    inc and drainage of abcess    [place] DEEP INCIS FOOT BONE INFECTN Left 9/22/2018    LEFT FOOT DEBRIDEMENT INCISION AND DRAINAGE TOP AND BOTTOM performed by Junito Jamison DPM at [address] [place] [address] [phone]    sebaceous cyst removal times 4     TOE AMPUTATION      right great toe    TOE AMPUTATION Right 3/23/2019    TOE AMPUTATION RIGHT 5TH TOE performed by Junito Jamison DPM at [place] [address] TOE AMPUTATION Right 8/6/2019    TOE AMPUTATION RIGHT 4TH TOE performed by Junito Jamison DPM at [address] [place] [address] Se UPPER GASTROINTESTINAL ENDOSCOPY  06/2/2011    Mild gastritis with moderatley severe antritis, small hiatal hernia, Dr. Chris Ross N/A 1/12/2019    EGD BIOPSY performed by Hiram Chawla MD at [place] [address] History   Problem Relation Age of Onset   [facility] Cancer Mother         ?site   [facility] Stroke Mother     Bleeding Prob Mother     Diabetes Father     Heart Disease Father     High Blood Pressure Father     Obesity Father     Kidney Disease Father     Diabetes Sister     High Blood Pressure Sister     Mental Illness Sister     Obesity Sister     High Blood Pressure Other     Mental Illness Other         bipolar    Other Son         cyst in ear canal    ADHD Daughter        SOCIAL HISTORY    Social History     Tobacco Use    Smoking status: Former Smoker     Years: 20.00     Quit date: 11/18/2017     Years since quitting: 3.4    Smokeless tobacco: Never Used   Substance Use Topics    Alcohol use: Yes     Comment: occ    Drug use: Yes     Frequency: 7.0 times per week     Types: Marijuana       ALLERGIES    Allergies   Allergen Reactions    Adhesive Tape Rash    Doxycycline Nausea And Vomiting    Reglan [Metoclopramide] Anxiety       MEDICATIONS    No current facility-administered medications on file prior to encounter.       Current Outpatient Medications on File Problem List   Diagnosis    Hiatal hernia    Diabetes mellitus type 2, improved controlled    Diabetic neuropathy (HCC)    Panic attack    Anxiety associated with depression    Neck pain    DANIELA (obstructive sleep apnea)    Urinary frequency    Bilateral carpal tunnel syndrome- left worse than right    Hyperlipidemia with target LDL less than 100    HTN, goal below 140/90    Bronchitis    Osteomyelitis ([address]    Diabetic ulcer of left midfoot ([address]    WD-Diabetic ulcer of left midfoot associated with type 2 diabetes mellitus, with fat layer exposed ([address]    Abscess    Periumbilical hernia    WD-Wound, surgical, infected, initial encounter    Sepsis ([address]    Cellulitis of left foot    Constipation    Intractable nausea and vomiting    Uncontrolled type 2 diabetes mellitus (HCC)    Nausea and vomiting    Diabetic foot infection ([address]    Acute renal failure (ARF) ([address]    Osteomyelitis of fourth toe of right foot ([address]    Cellulitis    Cellulitis of left arm    Cellulitis, scrotum       Measurements:  Wound 07/18/17 Other (Comment) WOUND #2 LEFT PLANTAR (ONSET 3 MTHS) (Active)   Number of days: 1395       Wound 12/03/17 Other (Comment) Foot Left (Active)   Number of days: 1256       Wound 12/08/17 #3 abdoment (onset 3 days ago) SURGICAL (Active)   Number of days: 1252       Wound 12/08/17 #4 Lt plantar (onset 6 months ago) DIABETIC ALCALA 1 (Active)   Number of days: 1252       Wound 05/18/18 # 5 LEFT PLANTAR (ONSET 1 YEAR AGO) DM WAG 1 (Active)   Number of days: 5595       Wound 09/17/18 Left dorsal foot and 4th toe amp site 9/19/18 (Active)   Number of days: 968       Incision 05/22/15 Foot Right (Active)   Number of days: 2183       Incision 12/04/17 Abdomen Mid (Active)   Number of days: 1255       Incision 09/22/18 Foot Left (Active)   Number of days: 964       Wound 01/11/19 left plantar foot wound (Active)   Number of days: 853       Wound 03/21/19 Toe (Comment  which one) Anterior;Right (Active)   Number of days: 784       Wound 03/21/19 Foot Left;Posterior hard callus area (Active)   Number of days: 784       Wound 03/24/21 Left;Plantar (Active)   Number of days: 50       Wound 05/13/21 Left;Plantar (Active)   Wound Etiology Diabetic 05/13/21 1611   Dressing Status New dressing applied 05/13/21 1611   Wound Cleansed Cleansed with saline 05/13/21 1611   Dressing/Treatment Collagen 05/13/21 1611   Wound Length (cm) 0.3 cm 05/13/21 1611   Wound Width (cm) 0.5 cm 05/13/21 1611   Wound Depth (cm) 0.5 cm 05/13/21 1611   Wound Surface Area (cm^2) 0.15 cm^2 05/13/21 1611   Wound Volume (cm^3) 0.08 cm^3 05/13/21 1611   Distance Tunneling (cm) 0 cm 05/13/21 1611   Tunneling Position ___ O'Clock 0 05/13/21 1611   Undermining Starts ___ O'Clock 0 05/13/21 1611   Undermining Ends___ O'Clock 0 05/13/21 1611   Undermining Maxium Distance (cm) 0 05/13/21 1611   Drainage Amount Small 05/13/21 1611   Drainage Description Serosanguinous 05/13/21 1611   Odor None 05/13/21 1611   Margins Defined edges 05/13/21 1611   Wound Thickness Description not for Pressure Injury Full thickness 05/13/21 1611   Number of days: 0       Response to treatment:  Well tolerated by patient. Pain Assessment:  Severity:  0  Quality of pain:   Wound Pain Timing/Severity:   Premedicated: no    Plan:     Plan of Care: Wound 05/13/21 Left;Plantar-Dressing/Treatment: Collagen(optifoam border)     Patient laying in bed agreeable to wound care assessment. Pt is here for groin/scrotum cellulitis and having significant amount of pain. Scrotum is red and swollen and very painful. Pt has chronic diabetic/pressure wound to left plantar managed by podiatry. Wound cleansed with NS. Measured and pictured. There is a small wound opening filled with puracol and there is  lot of calloused area around wound. Rt foot intact with some dry eschar areas. Heels intact. Pt is generally not at risk for skin breakdown AEB nicole score. Specialty Bed Required :  no  [] Low Air Loss   [] Pressure Redistribution  [] Fluid Immersion  [] Bariatric  [] Total Pressure Relief  [] Other:     Discharge Plan:  Placement for patient upon discharge: tbd  Hospice Care: no  Patient appropriate for Outpatient [address]: pt follows with podiatry    Patient/Caregiver Teaching:  Level of patient/caregiver understanding able to: cares explained as given. Electronically signed by Anthony Valencia.  ROBIN Salvador, on 5/13/2021 at 4:15 PM

## 2021-05-13 NOTE — ED NOTES
Nurse to nurse report called to [facility]. All questions answered.       Francine Santillan RN  05/13/21 3784

## 2021-05-14 LAB
ANION GAP SERPL CALCULATED.3IONS-SCNC: 12 MMOL/L (ref 4–16)
BANDED NEUTROPHILS ABSOLUTE COUNT: 3.75 K/CU MM
BANDED NEUTROPHILS RELATIVE PERCENT: 30 % (ref 5–11)
BUN BLDV-MCNC: 40 MG/DL (ref 6–23)
CALCIUM SERPL-MCNC: 7.8 MG/DL (ref 8.3–10.6)
CHLORIDE BLD-SCNC: 97 MMOL/L (ref 99–110)
CO2: 19 MMOL/L (ref 21–32)
CREAT SERPL-MCNC: 3.2 MG/DL (ref 0.9–1.3)
DIFFERENTIAL TYPE: ABNORMAL
DOHLE BODIES: PRESENT
DOSE AMOUNT: NORMAL
DOSE TIME: NORMAL
EOSINOPHILS ABSOLUTE: 0.1 K/CU MM
EOSINOPHILS RELATIVE PERCENT: 1 % (ref 0–3)
GFR AFRICAN AMERICAN: 25 ML/MIN/1.73M2
GFR NON-AFRICAN AMERICAN: 21 ML/MIN/1.73M2
GLUCOSE BLD-MCNC: 102 MG/DL (ref 70–99)
GLUCOSE BLD-MCNC: 208 MG/DL (ref 70–99)
GLUCOSE BLD-MCNC: 218 MG/DL (ref 70–99)
GLUCOSE BLD-MCNC: 226 MG/DL (ref 70–99)
GLUCOSE BLD-MCNC: 233 MG/DL (ref 70–99)
HCT VFR BLD CALC: 27.7 % (ref 42–52)
HEMOGLOBIN: 9.1 GM/DL (ref 13.5–18)
LYMPHOCYTES ABSOLUTE: 0.4 K/CU MM
LYMPHOCYTES RELATIVE PERCENT: 3 % (ref 24–44)
MCH RBC QN AUTO: 29.4 PG (ref 27–31)
MCHC RBC AUTO-ENTMCNC: 32.9 % (ref 32–36)
MCV RBC AUTO: 89.6 FL (ref 78–100)
MONOCYTES ABSOLUTE: 0.4 K/CU MM
MONOCYTES RELATIVE PERCENT: 3 % (ref 0–4)
PDW BLD-RTO: 12.9 % (ref 11.7–14.9)
PLATELET # BLD: 178 K/CU MM (ref 140–440)
PMV BLD AUTO: 10.6 FL (ref 7.5–11.1)
POLYCHROMASIA: ABNORMAL
POTASSIUM SERPL-SCNC: 4 MMOL/L (ref 3.5–5.1)
RBC # BLD: 3.09 M/CU MM (ref 4.6–6.2)
SEGMENTED NEUTROPHILS ABSOLUTE COUNT: 7.9 K/CU MM
SEGMENTED NEUTROPHILS RELATIVE PERCENT: 63 % (ref 36–66)
SODIUM BLD-SCNC: 128 MMOL/L (ref 135–145)
VANCOMYCIN RANDOM: 13.6 UG/ML
WBC # BLD: 12.5 K/CU MM (ref 4–10.5)

## 2021-05-14 PROCEDURE — 82962 GLUCOSE BLOOD TEST: CPT

## 2021-05-14 PROCEDURE — 80048 BASIC METABOLIC PNL TOTAL CA: CPT

## 2021-05-14 PROCEDURE — 85007 BL SMEAR W/DIFF WBC COUNT: CPT

## 2021-05-14 PROCEDURE — 94761 N-INVAS EAR/PLS OXIMETRY MLT: CPT

## 2021-05-14 PROCEDURE — 6370000000 HC RX 637 (ALT 250 FOR IP): Performed by: FAMILY MEDICINE

## 2021-05-14 PROCEDURE — 6360000002 HC RX W HCPCS: Performed by: FAMILY MEDICINE

## 2021-05-14 PROCEDURE — 1200000000 HC SEMI PRIVATE

## 2021-05-14 PROCEDURE — 80202 ASSAY OF VANCOMYCIN: CPT

## 2021-05-14 PROCEDURE — 85027 COMPLETE CBC AUTOMATED: CPT

## 2021-05-14 PROCEDURE — 2580000003 HC RX 258: Performed by: INTERNAL MEDICINE

## 2021-05-14 PROCEDURE — 36415 COLL VENOUS BLD VENIPUNCTURE: CPT

## 2021-05-14 PROCEDURE — 2580000003 HC RX 258: Performed by: FAMILY MEDICINE

## 2021-05-14 PROCEDURE — 94150 VITAL CAPACITY TEST: CPT

## 2021-05-14 RX ADMIN — SODIUM CHLORIDE: 9 INJECTION, SOLUTION INTRAVENOUS at 14:57

## 2021-05-14 RX ADMIN — INSULIN LISPRO 25 UNITS: 100 INJECTION, SOLUTION INTRAVENOUS; SUBCUTANEOUS at 16:28

## 2021-05-14 RX ADMIN — MORPHINE SULFATE 4 MG: 4 INJECTION, SOLUTION INTRAMUSCULAR; INTRAVENOUS at 02:06

## 2021-05-14 RX ADMIN — MORPHINE SULFATE 4 MG: 4 INJECTION, SOLUTION INTRAMUSCULAR; INTRAVENOUS at 05:22

## 2021-05-14 RX ADMIN — ENOXAPARIN SODIUM 30 MG: 30 INJECTION SUBCUTANEOUS at 08:38

## 2021-05-14 RX ADMIN — MORPHINE SULFATE 4 MG: 4 INJECTION, SOLUTION INTRAMUSCULAR; INTRAVENOUS at 23:12

## 2021-05-14 RX ADMIN — GUAIFENESIN 1200 MG: 600 TABLET, EXTENDED RELEASE ORAL at 20:14

## 2021-05-14 RX ADMIN — MORPHINE SULFATE 4 MG: 4 INJECTION, SOLUTION INTRAMUSCULAR; INTRAVENOUS at 08:38

## 2021-05-14 RX ADMIN — ONDANSETRON 4 MG: 2 INJECTION INTRAMUSCULAR; INTRAVENOUS at 05:33

## 2021-05-14 RX ADMIN — MORPHINE SULFATE 4 MG: 4 INJECTION, SOLUTION INTRAMUSCULAR; INTRAVENOUS at 12:49

## 2021-05-14 RX ADMIN — CEFTRIAXONE 1000 MG: 1 INJECTION, POWDER, FOR SOLUTION INTRAMUSCULAR; INTRAVENOUS at 08:38

## 2021-05-14 RX ADMIN — ASPIRIN 81 MG: 81 TABLET, COATED ORAL at 08:38

## 2021-05-14 RX ADMIN — OXYCODONE HYDROCHLORIDE AND ACETAMINOPHEN 1 TABLET: 5; 325 TABLET ORAL at 22:30

## 2021-05-14 RX ADMIN — ZOLPIDEM TARTRATE 5 MG: 5 TABLET ORAL at 20:14

## 2021-05-14 RX ADMIN — MORPHINE SULFATE 4 MG: 4 INJECTION, SOLUTION INTRAMUSCULAR; INTRAVENOUS at 16:35

## 2021-05-14 RX ADMIN — OXYCODONE HYDROCHLORIDE AND ACETAMINOPHEN 1 TABLET: 5; 325 TABLET ORAL at 10:36

## 2021-05-14 RX ADMIN — MORPHINE SULFATE 4 MG: 4 INJECTION, SOLUTION INTRAMUSCULAR; INTRAVENOUS at 20:14

## 2021-05-14 RX ADMIN — OXYCODONE HYDROCHLORIDE AND ACETAMINOPHEN 1 TABLET: 5; 325 TABLET ORAL at 14:57

## 2021-05-14 RX ADMIN — GUAIFENESIN 1200 MG: 600 TABLET, EXTENDED RELEASE ORAL at 08:37

## 2021-05-14 RX ADMIN — ACETAMINOPHEN 650 MG: 325 TABLET ORAL at 08:38

## 2021-05-14 ASSESSMENT — PAIN DESCRIPTION - LOCATION
LOCATION: SCROTUM
LOCATION: SCROTUM
LOCATION: GROIN

## 2021-05-14 ASSESSMENT — PAIN DESCRIPTION - PAIN TYPE
TYPE: ACUTE PAIN

## 2021-05-14 ASSESSMENT — PAIN SCALES - GENERAL
PAINLEVEL_OUTOF10: 8
PAINLEVEL_OUTOF10: 9

## 2021-05-14 ASSESSMENT — PAIN DESCRIPTION - PROGRESSION
CLINICAL_PROGRESSION: NOT CHANGED
CLINICAL_PROGRESSION: GRADUALLY WORSENING
CLINICAL_PROGRESSION: NOT CHANGED

## 2021-05-14 ASSESSMENT — PAIN - FUNCTIONAL ASSESSMENT: PAIN_FUNCTIONAL_ASSESSMENT: PREVENTS OR INTERFERES SOME ACTIVE ACTIVITIES AND ADLS

## 2021-05-14 ASSESSMENT — PAIN DESCRIPTION - FREQUENCY
FREQUENCY: CONTINUOUS
FREQUENCY: CONTINUOUS

## 2021-05-14 ASSESSMENT — PAIN DESCRIPTION - DESCRIPTORS: DESCRIPTORS: SHARP;THROBBING

## 2021-05-14 NOTE — CARE COORDINATION
LSW reviewed chart/into room to initiate discharge planning. LSW introduced self and role of LSW. Pt is from home with spouse and children. Pt does not drive. Pt spouse provides all the transportation. Pt has PCP. Pt has med/Rx insurance. Pt reported that he had caresource but was switched to Medicare part A & B. Pt also has a secondary Mediciad. Pt reported that he used [facility] and they tried to charge him $200 for his insulin. Pt reported that he can not afford that. LSW informed him to talk to his doctor about switching his insulin to something he could afford or having it transferred to [facility]. [facility] has some insulin at a lower cost. Pt denied any home care or DME. Pt reported that the last time he had IV ATB he used [facility]. If he is going to be discharged again with IV ATB then he would like [facility] again. Cm to continue to follow.

## 2021-05-14 NOTE — PROGRESS NOTES
[address] [facility]  consulted by Dr. Dayna Piedra for monitoring and adjustment. Indication for treatment: SSTI  Goal trough: 10-15 mcg/mL  AUC/PAMELA: <500    Pertinent Laboratory Values:   Temp Readings from Last 3 Encounters:   05/14/21 100 °F (37.8 °C) (Oral)   04/04/21 98.1 °F (36.7 °C) (Oral)   03/29/21 98.8 °F (37.1 °C) (Oral)     Recent Labs     05/13/21  0058 05/13/21  0440 05/14/21  0732   WBC 16.0*  --  12.5*   LACTATE  --  1.5  --      Recent Labs     05/13/21  0058 05/14/21  0732   BUN 33* 40*   CREATININE 2.8* 3.2*     Estimated Creatinine Clearance: 39 mL/min (A) (based on SCr of 3.2 mg/dL (H)). Intake/Output Summary (Last 24 hours) at 5/14/2021 1001  Last data filed at 5/14/2021 0718  Gross per 24 hour   Intake 2442.25 ml   Output 2100 ml   Net 342.25 ml       Pertinent Cultures:  Date    Source    Results  5/13   Blood    Ordered    Vancomycin level:   TROUGH:  No results for input(s): VANCOTROUGH in the last 72 hours. RANDOM:    Recent Labs     05/14/21  0732   VANCORANDOM 13.6       Assessment:  · WBC and temperature: WBC improved @ 12.5;  Tmax = 100F  · SCr, BUN, and urine output:   · baseline Scr is elevated, appears in CARMEN  · Scr trend worsening 2.8 -> 3.2  · Day(s) of therapy: #2  · Vancomycin concentration:   · 5/14: 13.6, therapeutic    Plan:  · Intermittent vancomycin dosing based on levels while in CARMEN  · Vancomycin level is therapeutic, will hold additional vancomycin today given worsening Scr and BUN trends  · Plan to check a random level tomorrow AM and re-dose if appropriate  · Pharmacy will continue to monitor patient and adjust therapy as indicated    VANCOMYCIN CONCENTRATION SCHEDULED FOR 5/15 @ 0600    Thank you for the consult,  Yovanny Ramsay, [address]   5/14/2021 10:01 AM

## 2021-05-14 NOTE — CONSULTS
[address], [address] [facility]                                  CONSULTATION    PATIENT NAME: Giovana Goodwin                     :        1975  MED REC NO:   [id number]                          ROOM:       1130  ACCOUNT NO:   [de-identified]                           ADMIT DATE: 2021  PROVIDER:     Aleksandra Reynolds MD    CONSULT DATE:  2021    CONSULT REQUESTED BY:  Alondra Turcios MD    REASON FOR CONSULT:  Acute kidney injury with underlying CKD stage 3a,  a3. BRIEF HISTORY:  The patient is an unfortunate 51-year-old male with  diabetic kidney disease presents to the emergency room on 2021,  with testicular swelling, pain, and shortness of breath. In the  emergency room, he had clinical evidence of orchitis-epididymitis and  underwent several diagnostic tests which included imaging and  biochemical study. Imaging study which was mainly ultrasound as well as  chest x-ray did not reveal any evidence of abscess, but some evidence of  epididymitis-orchitis, I think. Biochemical testing though showed  increased BUN and creatinine, 33 and 2.8 respectively was much higher  than his recent creatinine of 1.7 to 1.9. He was given IV fluid,  empirical antibiotic, and subsequently admitted for further evaluation. This morning when I saw him, with any movement it produces some pain,  which I can understand and he is actually screaming with the pain as he  complained that every time he moves around, he had significant pain. When he presented to the emergency room, he did have significant  hypertension given his history of hypertension. I am familiar with him. I saw him last time in March of this year when  he came with a skin, soft tissue, and bone infection, he sustained an  acute kidney injury and his creatinine peaked around 2.7, and it was  going down, but it stayed about 1.7 to 1.9.   Briefly speaking, he has  progressive kidney disease; for instance, his creatinine was about 1.0  to 1.2 range, even up until 2019, but since then it is slowly increasing  as well as bouts of acute kidney injury does not help him. Last time when he was here, he had massive proteinuria, about 14 gm per  day, confirmed by 24-hour urine. I did all the serology which included  ANCA, complement, SPEP, UPEP, light chain, hepatitis B and C, and LOVE,  all of them were negative. So, I think, it is all from diabetic kidney  disease. PAST MEDICAL HISTORY:  1.  CKD stage 3a, a3 unfortunately progressing. 2.  Diabetes mellitus diagnosed 15 years ago, although initially was on  combination of insulin oral pills, but now exclusively on insulin for  the last _____ years. He does have diabetic retinopathy as well as  nephropathy of course, and several skin and soft tissue and bone  infection. 3.  Hypertension, longstanding. 4.  One episode of pericarditis, apparently had a heart cath 10 years  ago, no obstructive coronary artery disease at that time. 5.  Probable sleep apnea. He is not on CPAP machine. 6.  Diabetic neuropathy, chronic pain, and recent skin, soft tissue, and  bone infection involving his lower extremity. HABITS:  He smokes half pack per day, has been doing it for 20 years. I  had a long talk with him last time to quit smoking. He is a social  drinker. No history of illicit drug abuse. FAMILY MEDICAL HISTORY:  Pretty significant. His father had diabetic  kidney disease and end-stage renal disease,  from complication, he  was on dialysis, in his [de-identified]. Mom also had malignancy. SOCIAL HISTORY:  The patient is  since . Does have four  children. He is a , but is currently on disability for the last 4  years or so. He was born and raised in [facility] way. REVIEW OF SYSTEMS:  Mainly pain in the testicular area, leg edema,  shortness of breath, low-grade fever, chills.   Rest of the reduction,  minimize infection, etc., as he is very young, obviously having all this  comorbidities, he has high risk of infection.         Rhea Gómez MD    D: 05/14/2021 8:34:41       T: 05/14/2021 8:43:53     [email or web address]  Job#: [id number]     Doc#: [id number]    CC:

## 2021-05-14 NOTE — CONSULTS
Pt seen ,examined,interviewed and chart reviewed. Please see the dictated consult for details     Imp :   1. CARMEN- CKD stage  3 a A3-- likely from sepsis- but he  Has NS   2. Ac epididymitis- orchitis - no abscess apparently with recent LE infection  3. DM/DKD- HTN/HLp etc- anemia      Plan:  1. He still has massive proteinuria lat time had 14 g/d - all serology neg  2. Stop IVF after liter- po food/ fluid as he  Has  edema   3. tx for infection  4.  Good BS control  5. unfortunately he has  Severe  High risk  For  renal dz progression and MAACE- ( major adverse cerebral and cardiovascular events ) so main focus is to maximized risk reduction as he is very young - h has high isk of infection off course       Thanks for the consult    #[id number]

## 2021-05-15 ENCOUNTER — ANESTHESIA (OUTPATIENT)
Dept: OPERATING ROOM | Age: 46
DRG: 853 | End: 2021-05-15
Payer: MEDICARE

## 2021-05-15 ENCOUNTER — ANESTHESIA EVENT (OUTPATIENT)
Dept: OPERATING ROOM | Age: 46
DRG: 853 | End: 2021-05-15
Payer: MEDICARE

## 2021-05-15 ENCOUNTER — APPOINTMENT (OUTPATIENT)
Dept: CT IMAGING | Age: 46
DRG: 853 | End: 2021-05-15
Payer: MEDICARE

## 2021-05-15 VITALS
SYSTOLIC BLOOD PRESSURE: 98 MMHG | RESPIRATION RATE: 13 BRPM | TEMPERATURE: 98.6 F | DIASTOLIC BLOOD PRESSURE: 76 MMHG | OXYGEN SATURATION: 95 %

## 2021-05-15 LAB
ANION GAP SERPL CALCULATED.3IONS-SCNC: 17 MMOL/L (ref 4–16)
BUN BLDV-MCNC: 45 MG/DL (ref 6–23)
CALCIUM SERPL-MCNC: 7.7 MG/DL (ref 8.3–10.6)
CHLAMYDIA TRACHOMATIS AMPLIFIED DET: NEGATIVE
CHLORIDE BLD-SCNC: 96 MMOL/L (ref 99–110)
CO2: 16 MMOL/L (ref 21–32)
CREAT SERPL-MCNC: 3.3 MG/DL (ref 0.9–1.3)
CREATININE URINE: 123.2 MG/DL (ref 39–259)
DOSE AMOUNT: NORMAL
DOSE TIME: NORMAL
GFR AFRICAN AMERICAN: 25 ML/MIN/1.73M2
GFR NON-AFRICAN AMERICAN: 20 ML/MIN/1.73M2
GLUCOSE BLD-MCNC: 132 MG/DL (ref 70–99)
GLUCOSE BLD-MCNC: 135 MG/DL (ref 70–99)
GLUCOSE BLD-MCNC: 186 MG/DL (ref 70–99)
GLUCOSE BLD-MCNC: 209 MG/DL (ref 70–99)
GLUCOSE BLD-MCNC: 214 MG/DL (ref 70–99)
HCT VFR BLD CALC: 29 % (ref 42–52)
HEMOGLOBIN: 8.9 GM/DL (ref 13.5–18)
MCH RBC QN AUTO: 28.3 PG (ref 27–31)
MCHC RBC AUTO-ENTMCNC: 30.7 % (ref 32–36)
MCV RBC AUTO: 92.4 FL (ref 78–100)
N GONORRHOEAE AMPLIFIED DET: NEGATIVE
PDW BLD-RTO: 12.9 % (ref 11.7–14.9)
PLATELET # BLD: 184 K/CU MM (ref 140–440)
PMV BLD AUTO: 11.6 FL (ref 7.5–11.1)
POTASSIUM SERPL-SCNC: 4.5 MMOL/L (ref 3.5–5.1)
PROT/CREAT RATIO, UR: 3.1
RBC # BLD: 3.14 M/CU MM (ref 4.6–6.2)
SARS-COV-2, NAAT: NOT DETECTED
SODIUM BLD-SCNC: 129 MMOL/L (ref 135–145)
SOURCE: NORMAL
URINE TOTAL PROTEIN: 387.6 MG/DL
VANCOMYCIN RANDOM: 8.8 UG/ML
WBC # BLD: 15.1 K/CU MM (ref 4–10.5)

## 2021-05-15 PROCEDURE — 2500000003 HC RX 250 WO HCPCS: Performed by: NURSE ANESTHETIST, CERTIFIED REGISTERED

## 2021-05-15 PROCEDURE — 6360000002 HC RX W HCPCS: Performed by: SURGERY

## 2021-05-15 PROCEDURE — 6370000000 HC RX 637 (ALT 250 FOR IP): Performed by: INTERNAL MEDICINE

## 2021-05-15 PROCEDURE — 87205 SMEAR GRAM STAIN: CPT

## 2021-05-15 PROCEDURE — 80048 BASIC METABOLIC PNL TOTAL CA: CPT

## 2021-05-15 PROCEDURE — 36415 COLL VENOUS BLD VENIPUNCTURE: CPT

## 2021-05-15 PROCEDURE — 6370000000 HC RX 637 (ALT 250 FOR IP): Performed by: SURGERY

## 2021-05-15 PROCEDURE — 87077 CULTURE AEROBIC IDENTIFY: CPT

## 2021-05-15 PROCEDURE — 2500000003 HC RX 250 WO HCPCS: Performed by: SURGERY

## 2021-05-15 PROCEDURE — 99222 1ST HOSP IP/OBS MODERATE 55: CPT | Performed by: SURGERY

## 2021-05-15 PROCEDURE — 7100000000 HC PACU RECOVERY - FIRST 15 MIN

## 2021-05-15 PROCEDURE — 87086 URINE CULTURE/COLONY COUNT: CPT

## 2021-05-15 PROCEDURE — 11004 DBRDMT SKIN XTRNL GENT&PER: CPT | Performed by: SURGERY

## 2021-05-15 PROCEDURE — 85027 COMPLETE CBC AUTOMATED: CPT

## 2021-05-15 PROCEDURE — 3700000000 HC ANESTHESIA ATTENDED CARE: Performed by: UROLOGY

## 2021-05-15 PROCEDURE — 2580000003 HC RX 258: Performed by: FAMILY MEDICINE

## 2021-05-15 PROCEDURE — 87076 CULTURE ANAEROBE IDENT EACH: CPT

## 2021-05-15 PROCEDURE — 2580000003 HC RX 258: Performed by: SURGERY

## 2021-05-15 PROCEDURE — 80202 ASSAY OF VANCOMYCIN: CPT

## 2021-05-15 PROCEDURE — 94761 N-INVAS EAR/PLS OXIMETRY MLT: CPT

## 2021-05-15 PROCEDURE — 84156 ASSAY OF PROTEIN URINE: CPT

## 2021-05-15 PROCEDURE — 0VJ8XZZ INSPECTION OF SCROTUM AND TUNICA VAGINALIS, EXTERNAL APPROACH: ICD-10-PCS | Performed by: UROLOGY

## 2021-05-15 PROCEDURE — 3600000012 HC SURGERY LEVEL 2 ADDTL 15MIN: Performed by: UROLOGY

## 2021-05-15 PROCEDURE — 87635 SARS-COV-2 COVID-19 AMP PRB: CPT

## 2021-05-15 PROCEDURE — 72192 CT PELVIS W/O DYE: CPT

## 2021-05-15 PROCEDURE — 6370000000 HC RX 637 (ALT 250 FOR IP): Performed by: FAMILY MEDICINE

## 2021-05-15 PROCEDURE — 82570 ASSAY OF URINE CREATININE: CPT

## 2021-05-15 PROCEDURE — 3600000002 HC SURGERY LEVEL 2 BASE: Performed by: UROLOGY

## 2021-05-15 PROCEDURE — 6360000002 HC RX W HCPCS: Performed by: NURSE ANESTHETIST, CERTIFIED REGISTERED

## 2021-05-15 PROCEDURE — 87075 CULTR BACTERIA EXCEPT BLOOD: CPT

## 2021-05-15 PROCEDURE — 3700000001 HC ADD 15 MINUTES (ANESTHESIA): Performed by: UROLOGY

## 2021-05-15 PROCEDURE — 2580000003 HC RX 258: Performed by: NURSE ANESTHETIST, CERTIFIED REGISTERED

## 2021-05-15 PROCEDURE — 82962 GLUCOSE BLOOD TEST: CPT

## 2021-05-15 PROCEDURE — 6360000002 HC RX W HCPCS: Performed by: FAMILY MEDICINE

## 2021-05-15 PROCEDURE — 87070 CULTURE OTHR SPECIMN AEROBIC: CPT

## 2021-05-15 PROCEDURE — 88305 TISSUE EXAM BY PATHOLOGIST: CPT | Performed by: PATHOLOGY

## 2021-05-15 PROCEDURE — 2709999900 HC NON-CHARGEABLE SUPPLY: Performed by: UROLOGY

## 2021-05-15 PROCEDURE — 2000000000 HC ICU R&B

## 2021-05-15 RX ORDER — LIDOCAINE HYDROCHLORIDE 20 MG/ML
INJECTION, SOLUTION INTRAVENOUS PRN
Status: DISCONTINUED | OUTPATIENT
Start: 2021-05-15 | End: 2021-05-15 | Stop reason: SDUPTHER

## 2021-05-15 RX ORDER — PROPOFOL 10 MG/ML
INJECTION, EMULSION INTRAVENOUS PRN
Status: DISCONTINUED | OUTPATIENT
Start: 2021-05-15 | End: 2021-05-15 | Stop reason: SDUPTHER

## 2021-05-15 RX ORDER — SODIUM CHLORIDE, SODIUM LACTATE, POTASSIUM CHLORIDE, CALCIUM CHLORIDE 600; 310; 30; 20 MG/100ML; MG/100ML; MG/100ML; MG/100ML
INJECTION, SOLUTION INTRAVENOUS CONTINUOUS PRN
Status: DISCONTINUED | OUTPATIENT
Start: 2021-05-15 | End: 2021-05-15 | Stop reason: SDUPTHER

## 2021-05-15 RX ORDER — FENTANYL CITRATE 50 UG/ML
INJECTION, SOLUTION INTRAMUSCULAR; INTRAVENOUS PRN
Status: DISCONTINUED | OUTPATIENT
Start: 2021-05-15 | End: 2021-05-15 | Stop reason: SDUPTHER

## 2021-05-15 RX ORDER — ONDANSETRON 2 MG/ML
INJECTION INTRAMUSCULAR; INTRAVENOUS PRN
Status: DISCONTINUED | OUTPATIENT
Start: 2021-05-15 | End: 2021-05-15 | Stop reason: SDUPTHER

## 2021-05-15 RX ORDER — HYDROMORPHONE HCL 110MG/55ML
PATIENT CONTROLLED ANALGESIA SYRINGE INTRAVENOUS PRN
Status: DISCONTINUED | OUTPATIENT
Start: 2021-05-15 | End: 2021-05-15

## 2021-05-15 RX ORDER — ROCURONIUM BROMIDE 10 MG/ML
INJECTION, SOLUTION INTRAVENOUS PRN
Status: DISCONTINUED | OUTPATIENT
Start: 2021-05-15 | End: 2021-05-15 | Stop reason: SDUPTHER

## 2021-05-15 RX ORDER — INSULIN GLARGINE 100 [IU]/ML
30 INJECTION, SOLUTION SUBCUTANEOUS ONCE
Status: COMPLETED | OUTPATIENT
Start: 2021-05-15 | End: 2021-05-15

## 2021-05-15 RX ORDER — KETAMINE HCL 50MG/ML(1)
SYRINGE (ML) INTRAVENOUS PRN
Status: DISCONTINUED | OUTPATIENT
Start: 2021-05-15 | End: 2021-05-15 | Stop reason: SDUPTHER

## 2021-05-15 RX ORDER — MIDAZOLAM HYDROCHLORIDE 1 MG/ML
INJECTION INTRAMUSCULAR; INTRAVENOUS PRN
Status: DISCONTINUED | OUTPATIENT
Start: 2021-05-15 | End: 2021-05-15 | Stop reason: SDUPTHER

## 2021-05-15 RX ORDER — SODIUM CHLORIDE 9 MG/ML
INJECTION, SOLUTION INTRAVENOUS CONTINUOUS
Status: DISCONTINUED | OUTPATIENT
Start: 2021-05-15 | End: 2021-05-16

## 2021-05-15 RX ADMIN — FAMOTIDINE 20 MG: 10 INJECTION, SOLUTION INTRAVENOUS at 15:20

## 2021-05-15 RX ADMIN — OXYCODONE HYDROCHLORIDE AND ACETAMINOPHEN 1 TABLET: 5; 325 TABLET ORAL at 06:43

## 2021-05-15 RX ADMIN — GUAIFENESIN 1200 MG: 600 TABLET, EXTENDED RELEASE ORAL at 08:32

## 2021-05-15 RX ADMIN — SODIUM CHLORIDE: 9 INJECTION, SOLUTION INTRAVENOUS at 17:57

## 2021-05-15 RX ADMIN — FENTANYL CITRATE 50 MCG: 50 INJECTION, SOLUTION INTRAMUSCULAR; INTRAVENOUS at 15:29

## 2021-05-15 RX ADMIN — ENOXAPARIN SODIUM 30 MG: 30 INJECTION SUBCUTANEOUS at 08:32

## 2021-05-15 RX ADMIN — OXYCODONE HYDROCHLORIDE AND ACETAMINOPHEN 1 TABLET: 5; 325 TABLET ORAL at 11:10

## 2021-05-15 RX ADMIN — SUGAMMADEX 200 MG: 100 INJECTION, SOLUTION INTRAVENOUS at 16:32

## 2021-05-15 RX ADMIN — PROPOFOL 200 MG: 10 INJECTION, EMULSION INTRAVENOUS at 15:33

## 2021-05-15 RX ADMIN — INSULIN GLARGINE 30 UNITS: 100 INJECTION, SOLUTION SUBCUTANEOUS at 22:38

## 2021-05-15 RX ADMIN — ONDANSETRON 4 MG: 2 INJECTION INTRAMUSCULAR; INTRAVENOUS at 15:20

## 2021-05-15 RX ADMIN — DEXTROSE MONOHYDRATE 900 MG: 50 INJECTION, SOLUTION INTRAVENOUS at 23:30

## 2021-05-15 RX ADMIN — PIPERACILLIN SODIUM AND TAZOBACTAM SODIUM 4500 MG: 4; .5 INJECTION, POWDER, LYOPHILIZED, FOR SOLUTION INTRAVENOUS at 15:40

## 2021-05-15 RX ADMIN — CEFTRIAXONE 1000 MG: 1 INJECTION, POWDER, FOR SOLUTION INTRAMUSCULAR; INTRAVENOUS at 08:32

## 2021-05-15 RX ADMIN — PIPERACILLIN SODIUM AND TAZOBACTAM SODIUM 4500 MG: 4; .5 INJECTION, POWDER, LYOPHILIZED, FOR SOLUTION INTRAVENOUS at 22:14

## 2021-05-15 RX ADMIN — SODIUM CHLORIDE, POTASSIUM CHLORIDE, SODIUM LACTATE AND CALCIUM CHLORIDE: 600; 310; 30; 20 INJECTION, SOLUTION INTRAVENOUS at 15:20

## 2021-05-15 RX ADMIN — MORPHINE SULFATE 4 MG: 4 INJECTION, SOLUTION INTRAMUSCULAR; INTRAVENOUS at 13:02

## 2021-05-15 RX ADMIN — ZOLPIDEM TARTRATE 5 MG: 5 TABLET ORAL at 21:56

## 2021-05-15 RX ADMIN — GUAIFENESIN 1200 MG: 600 TABLET, EXTENDED RELEASE ORAL at 21:09

## 2021-05-15 RX ADMIN — ASPIRIN 81 MG: 81 TABLET, COATED ORAL at 08:32

## 2021-05-15 RX ADMIN — INSULIN LISPRO 25 UNITS: 100 INJECTION, SOLUTION INTRAVENOUS; SUBCUTANEOUS at 12:47

## 2021-05-15 RX ADMIN — MORPHINE SULFATE 4 MG: 4 INJECTION, SOLUTION INTRAMUSCULAR; INTRAVENOUS at 09:33

## 2021-05-15 RX ADMIN — VANCOMYCIN HYDROCHLORIDE 1500 MG: 5 INJECTION, POWDER, LYOPHILIZED, FOR SOLUTION INTRAVENOUS at 11:10

## 2021-05-15 RX ADMIN — ROCURONIUM BROMIDE 90 MG: 10 INJECTION INTRAVENOUS at 15:33

## 2021-05-15 RX ADMIN — LIDOCAINE HYDROCHLORIDE 60 MG: 20 INJECTION, SOLUTION INTRAVENOUS at 15:33

## 2021-05-15 RX ADMIN — MIDAZOLAM 2 MG: 1 INJECTION INTRAMUSCULAR; INTRAVENOUS at 15:20

## 2021-05-15 RX ADMIN — HYDROMORPHONE HYDROCHLORIDE 0.25 MG: 1 INJECTION, SOLUTION INTRAMUSCULAR; INTRAVENOUS; SUBCUTANEOUS at 21:08

## 2021-05-15 RX ADMIN — MORPHINE SULFATE 4 MG: 4 INJECTION, SOLUTION INTRAMUSCULAR; INTRAVENOUS at 05:24

## 2021-05-15 RX ADMIN — MORPHINE SULFATE 4 MG: 4 INJECTION, SOLUTION INTRAMUSCULAR; INTRAVENOUS at 02:15

## 2021-05-15 RX ADMIN — OXYCODONE HYDROCHLORIDE AND ACETAMINOPHEN 1 TABLET: 5; 325 TABLET ORAL at 02:30

## 2021-05-15 RX ADMIN — FENTANYL CITRATE 50 MCG: 50 INJECTION, SOLUTION INTRAMUSCULAR; INTRAVENOUS at 15:20

## 2021-05-15 RX ADMIN — Medication 50 MG: at 15:53

## 2021-05-15 RX ADMIN — SUGAMMADEX 200 MG: 100 INJECTION, SOLUTION INTRAVENOUS at 16:29

## 2021-05-15 RX ADMIN — DEXTROSE MONOHYDRATE 900 MG: 50 INJECTION, SOLUTION INTRAVENOUS at 15:41

## 2021-05-15 ASSESSMENT — ENCOUNTER SYMPTOMS
GASTROINTESTINAL NEGATIVE: 1
COLOR CHANGE: 1
EYES NEGATIVE: 1
RESPIRATORY NEGATIVE: 1
ALLERGIC/IMMUNOLOGIC NEGATIVE: 1

## 2021-05-15 ASSESSMENT — PULMONARY FUNCTION TESTS
PIF_VALUE: 28
PIF_VALUE: 29
PIF_VALUE: 6
PIF_VALUE: 32
PIF_VALUE: 27
PIF_VALUE: 4
PIF_VALUE: 7
PIF_VALUE: 29
PIF_VALUE: 6
PIF_VALUE: 31
PIF_VALUE: 26
PIF_VALUE: 29
PIF_VALUE: 28
PIF_VALUE: 27
PIF_VALUE: 30
PIF_VALUE: 31
PIF_VALUE: 30
PIF_VALUE: 29
PIF_VALUE: 28
PIF_VALUE: 34
PIF_VALUE: 31
PIF_VALUE: 6
PIF_VALUE: 25
PIF_VALUE: 4
PIF_VALUE: 29
PIF_VALUE: 4
PIF_VALUE: 29
PIF_VALUE: 27
PIF_VALUE: 8
PIF_VALUE: 30
PIF_VALUE: 30
PIF_VALUE: 26
PIF_VALUE: 17
PIF_VALUE: 23
PIF_VALUE: 31
PIF_VALUE: 25
PIF_VALUE: 1
PIF_VALUE: 4
PIF_VALUE: 29
PIF_VALUE: 1
PIF_VALUE: 29
PIF_VALUE: 22
PIF_VALUE: 3
PIF_VALUE: 31
PIF_VALUE: 1

## 2021-05-15 ASSESSMENT — PAIN DESCRIPTION - PROGRESSION
CLINICAL_PROGRESSION: GRADUALLY WORSENING
CLINICAL_PROGRESSION: NOT CHANGED

## 2021-05-15 ASSESSMENT — PAIN DESCRIPTION - ONSET
ONSET: ON-GOING

## 2021-05-15 ASSESSMENT — PAIN DESCRIPTION - FREQUENCY
FREQUENCY: CONTINUOUS
FREQUENCY: CONTINUOUS

## 2021-05-15 ASSESSMENT — PAIN DESCRIPTION - DESCRIPTORS
DESCRIPTORS: CONSTANT
DESCRIPTORS: THROBBING;CONSTANT
DESCRIPTORS: CONSTANT;THROBBING

## 2021-05-15 ASSESSMENT — PAIN DESCRIPTION - PAIN TYPE
TYPE: ACUTE PAIN

## 2021-05-15 ASSESSMENT — PAIN SCALES - GENERAL
PAINLEVEL_OUTOF10: 6
PAINLEVEL_OUTOF10: 9
PAINLEVEL_OUTOF10: 10
PAINLEVEL_OUTOF10: 8
PAINLEVEL_OUTOF10: 9

## 2021-05-15 ASSESSMENT — PAIN DESCRIPTION - ORIENTATION
ORIENTATION: INNER
ORIENTATION: RIGHT;LEFT;MID;ANTERIOR;POSTERIOR

## 2021-05-15 ASSESSMENT — PAIN DESCRIPTION - LOCATION
LOCATION: SCROTUM

## 2021-05-15 NOTE — CONSULTS
Department of General Surgery   Surgical Service Dr. Sohan Nieves   Consult Note    Date of Consult: 5/15/21    Reason for Consult:  Concern for tolu gangrene    Requesting Physician:  Blanca Messer / Dr. Tristian Villarreal:  Scrotal swelling and pain    History Obtained From:  patient, electronic medical record    HISTORY OF PRESENT ILLNESS:      The patient is a 55 y.o. male who presented to the ED with complaints described as:      Location: Scrotum, initially was right side  Quality: fullness, pain  Severity: Now 10 /10, initially was not  Duration: Since Friday; however acutely worse   Timing: Acute  Context: Hx of leg infection in past per pt  Modifying factors: denies  Associated signs and symptoms: denies F/C. Pt was admitted to Hospitalist service. Pt was started on Abx. Pt was seen by Urology PA today and then a c/s was placed to General Surgery at ~ 130 pm.       Past Medical History:    Past Medical History:   Diagnosis Date    Abscess 2010    scrotal    Acute renal failure (ARF) ([address] 8/4/2019    Anxiety associated with depression     Anxiety associated with depression     Back pain 7/2/2012    Chest pain 5/1/2013    Diabetic nephropathy ([address]     Diabetic neuropathy ([address] 12/22/2011    Diabetic ulcer of left midfoot associated with type 2 diabetes mellitus, with fat layer exposed ([address] 7/18/2017    Diverticulosis     C scope + Dr. Ruben Mejia DM (diabetes mellitus), type 2 ([address] 2002    DR. Turner podiatry    Hyperlipidemia LDL goal < 100 7/15/2013    Hypertension     Internal hemorrhoid     C scope + Dr. Stanley Vicente fracture 1988    Panic attacks     Pericarditis 2003    Hospitalized with s/p heart cath normal    Peripheral autonomic neuropathy due to diabetes mellitus ([address]     Axonal EMG- NCS, March 2011    Sebaceous cyst 9/1/2011    URI (upper respiratory infection) 2/27/2012    WD-Wound, surgical, infected, initial encounter 12/8/2017    Wrist fracture 1986 Past Surgical History:    Past Surgical History:   Procedure Laterality Date    ABDOMEN SURGERY      CARDIAC CATHETERIZATION  2003, 2013    Normal (Dr. Sabino Pulido)    COLONOSCOPY  6/2/2011    Pandiverticulosis, Nonbleeding internal hemorrhoids, Repeat colonoscopy at age 48- Dr. Madhav Mart    \"had reconstruction surgery on nose a year after the other nose surgery\"    OTHER SURGICAL HISTORY Right 05/22/2015    I & D right great toe with partial amputation    OTHER SURGICAL HISTORY  12/04/2017    inc and drainage of abcess    CA DEEP INCIS FOOT BONE INFECTN Left 9/22/2018    LEFT FOOT DEBRIDEMENT INCISION AND DRAINAGE TOP AND BOTTOM performed by Brian Bauer DPM at [address] [place] [address] [id number]    sebaceous cyst removal times 4     TOE AMPUTATION      right great toe    TOE AMPUTATION Right 3/23/2019    TOE AMPUTATION RIGHT 5TH TOE performed by Brian Bauer DPM at [facility] TOE AMPUTATION Right 8/6/2019    TOE AMPUTATION RIGHT 4TH TOE performed by Brian Bauer DPM at [address] ENDOSCOPY  06/2/2011    Mild gastritis with moderatley severe antritis, small hiatal hernia, Dr. Kimberlyn Guerrier N/A 1/12/2019    EGD BIOPSY performed by Louie Pineda MD at [place] [facility] ENDOSCOPY       Current Medications:   Current Facility-Administered Medications   Medication Dose Route Frequency Provider Last Rate Last Admin    0.9 % sodium chloride infusion   Intravenous Continuous Shaka Rojo  mL/hr at 05/15/21 0836 Rate Change at 05/15/21 0836    clindamycin (CLEOCIN) 900 mg in dextrose 5 % 50 mL IVPB  900 mg Intravenous Q8H Char Abdi II, MD        piperacillin-tazobactam (ZOSYN) 4,500 mg in dextrose 5 % 100 mL IVPB (mini-bag)  4,500 mg Intravenous Q8H Char Abdi II, MD        ondansetron (ZOFRAN-ODT) disintegrating tablet 4 mg  4 mg Oral Q8H PRN MD Gayatri Mendez insulin lispro (HUMALOG) injection vial 25 Units  25 Units Subcutaneous TID WC José Luis Izquierdo MD   25 Units at 05/15/21 1247    aspirin EC tablet 81 mg  81 mg Oral Daily José Luis Izquierdo MD   81 mg at 05/15/21 0832    glucose (GLUTOSE) 40 % oral gel 15 g  15 g Oral PRN José Luis Izquierdo MD        dextrose 50 % IV solution  12.5 g Intravenous PRN Brooke Lennox, MD        glucagon (rDNA) injection 1 mg  1 mg Intramuscular PRN Brooke Lennox, MD        dextrose 5 % solution  100 mL/hr Intravenous PRN Brooke Lennox, MD        sodium chloride flush 0.9 % injection 5-40 mL  5-40 mL Intravenous 2 times per day Brooke Lennox, MD   10 mL at 05/13/21 2118    sodium chloride flush 0.9 % injection 5-40 mL  5-40 mL Intravenous PRN Brooke Lennox, MD        0.9 % sodium chloride infusion  25 mL Intravenous PRN Brooke Lennox, MD        potassium chloride (KLOR-CON M) extended release tablet 40 mEq  40 mEq Oral PRN Brooke Lennox, MD        Or    potassium bicarb-citric acid (EFFER-K) effervescent tablet 40 mEq  40 mEq Oral PRN Brooke Lennox, MD        Or    potassium chloride 10 mEq/100 mL IVPB (Peripheral Line)  10 mEq Intravenous PRN Brooke Lennox, MD        magnesium sulfate 2000 mg in 50 mL IVPB premix  2,000 mg Intravenous PRN José Luis Izquierdo MD        promethazine (PHENERGAN) tablet 12.5 mg  12.5 mg Oral Q6H PRN José Luis Izquierdo MD        Or    ondansetron (ZOFRAN) injection 4 mg  4 mg Intravenous Q6H PRN José Luis Izquierdo MD   4 mg at 05/14/21 0533    polyethylene glycol (GLYCOLAX) packet 17 g  17 g Oral Daily PRN José Luis Izquierdo MD        fleet rectal enema 1 enema  1 enema Rectal Daily PRN José Luis Izquierdo MD        nicotine (NICODERM CQ) 21 MG/24HR 1 patch  1 patch Transdermal Daily PRN José Luis Izquierdo MD        acetaminophen (TYLENOL) tablet 650 mg  650 mg Oral Q6H PRN José Luis Izquierdo MD   650 mg at 05/14/21 2107    Or    acetaminophen (TYLENOL) suppository 650 mg 650 mg Rectal Q6H PRN José Luis Izquierdo MD        enoxaparin (LOVENOX) injection 30 mg  30 mg Subcutaneous Daily José Luis Izquierdo MD   30 mg at 05/15/21 5419    zolpidem (AMBIEN) tablet 5 mg  5 mg Oral Nightly PRN José Luis Izquierdo MD   5 mg at 05/14/21 2014    guaiFENesin-dextromethorphan (ROBITUSSIN DM) 100-10 MG/5ML syrup 10 mL  10 mL Oral Q6H PRN José Luis Izquierdo MD        benzonatate (TESSALON) capsule 200 mg  200 mg Oral TID PRN José Luis Izquierdo MD        guaiFENesin (MUCINEX) extended release tablet 1,200 mg  1,200 mg Oral BID Alondra Turcios MD   1,200 mg at 05/15/21 6213    calcium carbonate (TUMS) chewable tablet 750 mg  750 mg Oral TID PRN José Luis Izquierdo MD        tiZANidine (ZANAFLEX) tablet 4 mg  4 mg Oral Q8H PRN José Luis Izquierdo MD        vancomycin (VANCOCIN) intermittent dosing (placeholder)   Other See Admin Instructions Alondra Turcios MD        insulin glargine (LANTUS) injection vial 60 Units  60 Units Subcutaneous Nightly José Luis Izquierdo MD   60 Units at 05/13/21 2126    oxyCODONE-acetaminophen (PERCOCET) 5-325 MG per tablet 1 tablet  1 tablet Oral Q4H PRN Alondra Turcios MD   1 tablet at 05/15/21 1110    morphine sulfate (PF) injection 4 mg  4 mg Intravenous Q3H PRN José Luis Izquierdo MD   4 mg at 05/15/21 1302       Allergies:  Adhesive tape, Doxycycline, and Reglan [metoclopramide]    Social History:   Social History     Socioeconomic History    Marital status:      Spouse name: None    Number of children: None    Years of education: None    Highest education level: None   Occupational History    None   Tobacco Use    Smoking status: Former Smoker     Years: 20.00     Quit date: 11/18/2017     Years since quitting: 3.4    Smokeless tobacco: Never Used   Vaping Use    Vaping Use: Never used   Substance and Sexual Activity    Alcohol use: Yes     Comment: occ    Drug use: Yes     Frequency: 7.0 times per week     Types: Marijuana    Sexual activity: Yes     Partners: Female   Other Topics Concern    None   Social History Narrative    None     Social Determinants of Health     Financial Resource Strain:     Difficulty of Paying Living Expenses:    Food Insecurity:     Worried About Running Out of Food in the Last Year:     [address] in the Last Year:    Transportation Needs:     Lack of Transportation (Medical):  Lack of Transportation (Non-Medical):    Physical Activity:     Days of Exercise per Week:     Minutes of Exercise per Session:    Stress:     Feeling of Stress :    Social Connections:     Frequency of Communication with Friends and Family:     Frequency of Social Gatherings with Friends and Family:     Attends Congregation Services:     Active Member of Clubs or Organizations:     Attends Club or Organization Meetings:     Marital Status:    Intimate Partner Violence:     Fear of Current or Ex-Partner:     Emotionally Abused:     Physically Abused:     Sexually Abused:        Family History:   Family History   Problem Relation Age of Onset    Cancer Mother         ?site    Stroke Mother     Bleeding Prob Mother     Diabetes Father     Heart Disease Father     High Blood Pressure Father     Obesity Father     Kidney Disease Father     Diabetes Sister     High Blood Pressure Sister     Mental Illness Sister     Obesity Sister     High Blood Pressure Other     Mental Illness Other         bipolar    Other Son         cyst in ear canal    ADHD Daughter        REVIEW OFSYSTEMS:    Review of Systems   Constitutional: Positive for fever (subjective). Negative for chills. HENT: Negative. Eyes: Negative. Respiratory: Negative. Cardiovascular: Negative. Gastrointestinal: Negative. Endocrine: Negative. Genitourinary: Positive for scrotal swelling and testicular pain. Musculoskeletal: Negative. Skin: Positive for color change. Allergic/Immunologic: Negative. Neurological: Negative. Hematological: Negative. Psychiatric/Behavioral: Negative. PHYSICAL EXAM:  Vitals:    05/14/21 2015 05/15/21 0523 05/15/21 0936 05/15/21 1201   BP: 117/64 136/84  138/64   Pulse: 91 96  96   Resp: 20 22  20   Temp: 99.7 °F (37.6 °C) 99.7 °F (37.6 °C)  98.7 °F (37.1 °C)   TempSrc: Oral Oral  Oral   SpO2: 95% 94% 94% 97%   Weight:       Height:           Physical Exam  Vitals reviewed. Constitutional:       General: He is not in acute distress. Appearance: He is not ill-appearing, toxic-appearing or diaphoretic. Comments: Uncomfortable     HENT:      Head: Normocephalic and atraumatic. Right Ear: External ear normal.      Left Ear: External ear normal.      Nose: Nose normal.   Eyes:      General:         Right eye: No discharge. Left eye: No discharge. Extraocular Movements: Extraocular movements intact. Cardiovascular:      Rate and Rhythm: Tachycardia present. Pulses: Normal pulses. Pulmonary:      Effort: No respiratory distress. Abdominal:      Palpations: Abdomen is soft. Tenderness: There is no abdominal tenderness. There is no guarding or rebound. Genitourinary:     Comments: Edematous and tender scrotum and perineum. Warm to touch. Musculoskeletal:         General: No swelling or tenderness. Cervical back: Normal range of motion. Skin:     General: Skin is warm. Neurological:      General: No focal deficit present. Mental Status: He is alert.    Psychiatric:         Mood and Affect: Mood normal.           DATA:    CBC:   Lab Results   Component Value Date    WBC 15.1 05/15/2021    RBC 3.14 05/15/2021    HGB 8.9 05/15/2021    HCT 29.0 05/15/2021    MCV 92.4 05/15/2021    MCH 28.3 05/15/2021    MCHC 30.7 05/15/2021    RDW 12.9 05/15/2021     05/15/2021    MPV 11.6 05/15/2021     CBC with Differential:    Lab Results   Component Value Date    WBC 15.1 05/15/2021    RBC 3.14 05/15/2021    HGB 8.9 05/15/2021    HCT 29.0 05/15/2021

## 2021-05-15 NOTE — CONSULTS
[facility], [address]   Consult Note  [facility] 1 2 3 4 5    Date: 5/15/2021   Patient: Jackie Hays   : 1975   DOA: 2021   MRN: [id number]   ROOM#: 1130/1130-A     Reason for Consult:  Scrotal Cellulitis, uncontrolled pain   Requesting Physician:    Collaborating Urologist on Call at time of admission: Dr. Cary Due:  Right sided scrotal pain and swelling    History Obtained From:  patient, electronic medical record    HISTORY OF PRESENT ILLNESS:                The patient is a 55 y.o. male with significant past medical history of HTN and uncontrolled DM who presented to [facility] ED on 21 with right sided scrotal pain and swelling for 5 days prior to arrival with associated lightheadedness and fatigue. There was no sign of Irene's gangrene or obvious abscess upon admission and diagnosed with epididymo-orchitis. Patient met sepsis criteria and found to have CARMEN and was admitted for further evaluation. He has been on IV Vanco and Rocephin without any improvement in pain and notes worsening edema. Patient extremely uncomfortable on exam. He notes a history of leg abscesses, denies any prior  history. Past Medical History:        Diagnosis Date    Abscess     scrotal    Acute renal failure (ARF) ([address] 2019    Anxiety associated with depression     Anxiety associated with depression     Back pain 2012    Chest pain 2013    Diabetic nephropathy ([address]     Diabetic neuropathy ([address] 2011    Diabetic ulcer of left midfoot associated with type 2 diabetes mellitus, with fat layer exposed ([address] 2017    Diverticulosis     C scope + Dr. Asif Whipple DM (diabetes mellitus), type 2 ([address]     DR. Turner podiatry    Hyperlipidemia LDL goal < 100 7/15/2013    Hypertension     Internal hemorrhoid     C scope + Dr. Ashanti Orellana Panic attacks     Pericarditis     Hospitalized with s/p heart cath normal    Peripheral autonomic neuropathy due to diabetes mellitus ([facility]     Axonal EMG- NCS, March 2011    Sebaceous cyst 9/1/2011    URI (upper respiratory infection) 2/27/2012    WD-Wound, surgical, infected, initial encounter 12/8/2017    Wrist fracture 1986     Past Surgical History:        Procedure Laterality Date    ABDOMEN SURGERY      CARDIAC CATHETERIZATION  2003, 2013    Normal (Dr. Madelaine Nair)    COLONOSCOPY  6/2/2011    Pandiverticulosis, Nonbleeding internal hemorrhoids, Repeat colonoscopy at age 48- Dr. Mahan Second    \"had reconstruction surgery on nose a year after the other nose surgery\"    OTHER SURGICAL HISTORY Right 05/22/2015    I & D right great toe with partial amputation    OTHER SURGICAL HISTORY  12/04/2017    inc and drainage of abcess    NV DEEP INCIS FOOT BONE INFECTN Left 9/22/2018    LEFT FOOT DEBRIDEMENT INCISION AND DRAINAGE TOP AND BOTTOM performed by Penny Devries DPM at [address] [place] [address] [id number]    sebaceous cyst removal times 4     TOE AMPUTATION      right great toe    TOE AMPUTATION Right 3/23/2019    TOE AMPUTATION RIGHT 5TH TOE performed by Penny Devries DPM at [facility] TOE AMPUTATION Right 8/6/2019    TOE AMPUTATION RIGHT 4TH TOE performed by Penny Devries DPM at [facility] [address] ENDOSCOPY  06/2/2011    Mild gastritis with moderatley severe antritis, small hiatal hernia, Dr. Tonya Stanley N/A 1/12/2019    EGD BIOPSY performed by Faith Hardy MD at [address] [facility] ENDOSCOPY     Current Medications:   Current Facility-Administered Medications: 0.9 % sodium chloride infusion, , Intravenous, Continuous  ondansetron (ZOFRAN-ODT) disintegrating tablet 4 mg, 4 mg, Oral, Q8H PRN  insulin lispro (HUMALOG) injection vial 25 Units, 25 Units, Subcutaneous, TID WC  aspirin EC tablet 81 mg, 81 mg, Oral, Daily  glucose (GLUTOSE) 40 % oral gel 15 g, 15 g, Oral, Nightly  oxyCODONE-acetaminophen (PERCOCET) 5-325 MG per tablet 1 tablet, 1 tablet, Oral, Q4H PRN  morphine sulfate (PF) injection 4 mg, 4 mg, Intravenous, Q3H PRN    Allergies:  Adhesive tape, Doxycycline, and Reglan [metoclopramide]    Social History:   TOBACCO:   reports that he quit smoking about 3 years ago. He quit after 20.00 years of use. He has never used smokeless tobacco.  ETOH:   reports current alcohol use. DRUGS:   reports current drug use. Frequency: 7.00 times per week. Drug: Marijuana. Family History:       Problem Relation Age of Onset   [employer] Cancer Mother         ?site   [employer] Stroke Mother     Bleeding Prob Mother     Diabetes Father     Heart Disease Father     High Blood Pressure Father     Obesity Father     Kidney Disease Father     Diabetes Sister     High Blood Pressure Sister     Mental Illness Sister     Obesity Sister     High Blood Pressure Other     Mental Illness Other         bipolar    Other Son         cyst in ear canal    ADHD Daughter        REVIEW OF SYSTEMS:     CONSTITUTIONAL:  positive for  chills and fatigue  RESPIRATORY:  negative  CARDIOVASCULAR:  negative  GASTROINTESTINAL:  positive for nausea and abdominal pain  GENITOURINARY:  positive for dysuria, decreased stream and scrotal enlargement and groin mass or swelling  MUSCULOSKELETAL:  negative    PHYSICAL EXAM:      VITALS:  /84   Pulse 96   Temp 99.7 °F (37.6 °C) (Oral)   Resp 22   Ht 5' 9\" (1.753 m)   Wt 290 lb 1.6 oz (131.6 kg)   SpO2 94%   BMI 42.84 kg/m²      TEMPERATURE:  Current - Temp: 99.7 °F (37.6 °C);  Max - Temp  Av.5 °F (37.5 °C)  Min: 98.5 °F (36.9 °C)  Max: 100 °F (37.8 °C)  24HR BLOOD PRESSURE RANGE:  Systolic (83FFN), NVY:468 , Min:117 , DXW:906   ; Diastolic (92ACL), WES:32, Min:64, Max:84      General appearance: alert, appears stated age, cooperative, moderate distress, morbidly obese and crying due to pain  Head: Normocephalic, without obvious abnormality, atraumatic  Abdomen: obese, protuberant abdomen, soft, non-distended, non-tender  Male genitalia: circumcised male, mildly buried penis, several bilateral scrotal edema with mild overlying erythema, no sign of scrotal abscess, there is an area of induration in the perineum just posterior to the scrotum and an area of induration along the right groin, no area of fluctuance, no sign of tolu's gangrene. Significant tenderness noted on exam limiting thorough examination due to patients pain     DATA:    WBC:    Lab Results   Component Value Date    WBC 12.5 05/14/2021     Hemoglobin/Hematocrit:    Lab Results   Component Value Date    HGB 9.1 05/14/2021    HCT 27.7 05/14/2021     BMP:    Lab Results   Component Value Date     05/14/2021    K 4.0 05/14/2021    CL 97 05/14/2021    CO2 19 05/14/2021    BUN 40 05/14/2021    LABALBU 2.7 04/09/2021    LABALBU 48 03/27/2021    CREATININE 3.2 05/14/2021    CALCIUM 7.8 05/14/2021    GFRAA 25 05/14/2021    LABGLOM 21 05/14/2021     Imaging:  Echocardiogram complete 2D with doppler with color    Result Date: 5/13/2021  Transthoracic Echocardiography Report (TTE)  Demographics   Patient Name       Tom Grossarch     Date of Study       05/13/2021   Date of Birth      1975         Gender              Male   Age                39 year(s)         Race                   Patient Number     [id number]         Room Number         1130   Visit Number       [id number]   Corporate ID       [id number]   Accession Number   [id number]         [address] [place] [address]   Ordering Physician Miguel Angel CHIU-CNP           Physician           MD  Procedure Type of Study   TTE procedure:ECHOCARDIOGRAM COMPLETE 2D W DOPPLER W COLOR.   Procedure Date Date: 05/13/2021 Start: 11:19 AM Study Location: Portable Technical Quality: Fair visualization Indications:Elevated troponin. Patient Status: Routine Height: 69 inches Weight: 260 pounds BSA: 2.31 m2 BMI: 38.39 kg/m2 HR: 96 bpm BP: 138/74 mmHg  Conclusions   Summary  Left ventricular systolic function is normal.  Ejection fraction is visually estimated at 55-60%. Mild left ventricular hypertrophy. Mildly dilated right ventricle. RVSP of 20 mmHG  Sclerotic, but non-stenotic aortic valve. No evidence of any pericardial effusion. Signature   ------------------------------------------------------------------  Electronically signed by Britta Schneider MD (Interpreting  physician) on 05/13/2021 at 05:55 PM  ------------------------------------------------------------------   Findings   Left Ventricle  Left ventricular systolic function is normal.  Ejection fraction is visually estimated at 55-60%. Mild left ventricular hypertrophy. Left Atrium  Essentially normal left atrium. Right Atrium  Essentially normal right atrium. Right Ventricle  Mildly dilated right ventricle. Aortic Valve  Sclerotic, but non-stenotic aortic valve. Trace aortic regurgitation. Mitral Valve  Trace mitral regurgitation. Tricuspid Valve  Trace tricuspid regurgitation; normal RVSP. Pulmonic Valve  The pulmonic valve was not well visualized. Pericardial Effusion  No evidence of any pericardial effusion. Pleural Effusion  No evidence of pleural effusion.   M-Mode/2D Measurements & Calculations   LV Diastolic Dimension:  LV Systolic Dimension:  LA Dimension: 3.8 cmAO Root  3.75 cm                  2.85 cm                 Dimension: 3 cmLA Area:  LV FS:24 %               LV Volume Diastolic: 84 19.9 cm2  LV PW Diastolic: 5.45 cm ml  LV PW Systolic: 0.62 cm  LV Volume Systolic: 40  Septum Diastolic: 7.31   ml  cm                       LV EDV/LV EDV Index: 84 RV Diastolic Dimension:  Septum Systolic: 3.88 cm KM/25 A0GP ESV/LV ESV   4.21 cm  CO: 5.45 l/min           Index: 40 ml/17 m2  CI: 2.36 l/m*m2 EF Calculated (A4C):    LA/Aorta: 1.27                           52.4 %  LV Area Diastolic: 34.8  EF Calculated (2D):     LA volume/Index: 48 ml  cm2                      48.5 %                  /20Z8  LV Area Systolic: 17 cm2                           LV Length: 6.41 cm                            LVOT: 2.1 cm  Doppler Measurements & Calculations   MV Peak E-Wave: 89.3  AV Peak Velocity: 156 cm/s   LVOT Peak Velocity: 105  cm/s                  AV Peak Gradient: 9.73 mmHg  cm/s  MV Peak A-Wave: 71.1  AV Mean Velocity: 108 cm/s   LVOT Mean Velocity: 73.3  cm/s                  AV Mean Gradient: 6 mmHg     cm/s  MV E/A Ratio: 1.26    AV VTI: 23.5 cm              LVOT Peak Gradient: 4  MV Peak Gradient:     AV Area (Continuity):2.42    mmHgLVOT Mean Gradient: 3  3.19 mmHg             cm2                          mmHg                                                     Estimated RVSP: 20 mmHg  MV P1/2t: 32 msec     LVOT VTI: 16.4 cm            Estimated RAP:3 mmHg  MVA by PHT:6.88 cm2                        Estimated PASP: 12.73 mmHg  MV E' Septal                                       TR Velocity:156 cm/s  Velocity: 9.87 cm/s                                TR Gradient:9.73 mmHg  MV E' Lateral  Velocity: 8.99 cm/s  MV E/E' septal: 9.05  MV E/E' lateral: 9.93      US SCROTUM AND TESTICLES    Result Date: 5/13/2021  EXAMINATION: ULTRASOUND OF THE SCROTUM/TESTICLES WITH COLOR DOPPLER FLOW EVALUATION; DOPPLER EVALUATION OF THE PELVIS 5/13/2021 COMPARISON: None.  HISTORY: ORDERING SYSTEM PROVIDED HISTORY: Right-sided testicular swelling TECHNOLOGIST PROVIDED HISTORY: Reason for exam:->Right-sided testicular swelling Reason for Exam: Rt pain and swelling Acuity: Acute Type of Exam: Initial; ORDERING SYSTEM PROVIDED HISTORY: Rt swelling TECHNOLOGIST PROVIDED HISTORY: Reason for exam:->Rt swelling Reason for Exam: Rt pain and swelling Acuity: Acute Type of Exam: Initial FINDINGS: Measurements: Right testicle: 2.9 x 3.1 x 3.7 cm Epididymis: The epididymis is enlarged and heterogeneous but does not demonstrate increased flow. Left: Grey scale: The left testicle demonstrates normal homogeneous echotexture without focal lesion. No evidence of testicular microlithiasis. Doppler Evaluation:  There is normal arterial and venous Doppler flow within the testicle. Scrotal Sac: There is a trace hydrocele. Scrotal wall edema is noted. Epididymis: The left epididymis could not be identified. 1. Normal flow in both testicles. 2. Appearance of the right epididymis suggests prior epididymitis. Assessment & Plan:      Angela Ford is a 55y.o. year old male admitted 5/13/2021 for scrotal cellulitis. 1) Scrotal Cellulitis: Scrotal US 5/13/21 suggested prior right sided epididymitis with scrotal wall edema     exam today with significant bilateral scrotal cellulitis and bilateral epididymo-orchitis. no sign of tolu's gangrene or palpable abscess    On IV Rocephin and Vanco, patient notes worsening symptoms since admission   Continue IV antibiotics and supportive care   Recommend checking CT pelvis for further evaluation, r/o developing abscess or developing tolu's    NPO after midnight in case patient requires scrotal exploration in AM    Will continue to follow closely     Patient seen and examined, chart reviewed.      Electronically signed by Mirian Hernandez PA-C on 5/15/2021 at 7:54 AM

## 2021-05-15 NOTE — PROGRESS NOTES
Pt awake and alert, following commands. Pt very tearful with wife at bedside. Wife crying saying she is very tired and worried for patient. This nurse offered support. Wife stated she will be back in the morning to check on patient. 1815-pt continues to be tearful, expressing suicidal thoughts and states \" I'm just ready for this to end. \" This nurse asked if he has a plan, patient says he says he has tried to make a plan so his son won't find him, he says he doesn't have the guts to. Pt states he doesn't want to hurt himself right now, \"just want the pain to stop\". \"If I was going to do it I would probably go find fair amount of narcotics and a big bottle of whisky, I think that is the most peaceful. \" \"But this method is not as thorough enough for me. \". This nurse offered support, pt very tearful and thankful. Pt says he wants to make lifestyle changes he needs to but just wants to feel supported and help with his feelings\". This nurse offered prayer, pt very thankful and agreeable.

## 2021-05-15 NOTE — OP NOTE
Operative Note      Patient: Randy Woodson  YOB: 1975  MRN: [id number]    Date of Procedure: 5/15/2021    Pre-Op Diagnosis: scrotum pain    Post-Op Diagnosis: scrotal and perineal gangrene       Procedure(s):  SCROTAL EXPLORATION    Surgeon(s):  Yamileth Agosto MD    Co-Surgeon:   Scott Jaramillo II, MD    Anesthesia: General    Estimated Blood Loss (mL): less than 480     Complications: None    Specimens:     Wound culture and tissue culture     Drains: 16 [place] citizen catheter    Findings: scrotal & perineal gangrene, viable testes    Detailed Description of Procedure:   54 yo diabetic male admitted to  with scrotal abscess, urologic consult placed today with subsequently ordered CT finding c/w tolu's gangrene. Urgently added on for exploration after general surgery consult placed. Made NPO. COVID negative. Consent signed and witnessed pre op. Patient was brought to the OR and anesthetized, prepped and draped. Time out taken to ensure the proper operation for the proper patient, all agreed. Catheter placed. Scrotum incised and a large pocket of abscess opened and drained. Large volume of skin (>8*15 cm) excised, all necrotic tissue debrided. Both testes viable. Wound extended to the perineum and right thigh. Wound thoroughly irrigated & dressed with betadine soaked gauze. Dr. Smith Plate op note will detail his portion of the procedure. Plan will be ICU now and a re-debridement tomorrow AM ~ 1000.       Wife Melva Guerra [phone]    Electronically signed by Yamileth Agosto MD on 5/15/2021 at 3:13 PM

## 2021-05-15 NOTE — ANESTHESIA PRE PROCEDURE
Department of Anesthesiology  Preprocedure Note       Name:  Maria E Sotelo   Age:  55 y.o.  :  1975                                          MRN:  [id number]         Date:  5/15/2021      Surgeon: Myra Naylor):  Sergo Viveros MD    Procedure: Procedure(s):  SCROTAL EXPLORATION    Medications prior to admission:   Prior to Admission medications    Medication Sig Start Date End Date Taking? Authorizing Provider   aspirin 81 MG EC tablet Take 1 tablet by mouth daily 21   Fredy Morales MD   insulin glargine (LANTUS) 100 UNIT/ML injection vial Inject 40 Units into the skin nightly 3/29/21   Alondra Turcios MD   insulin lispro (HUMALOG) 100 UNIT/ML injection vial Inject 25 Units into the skin 3 times daily (with meals) 3/29/21   Alondra Turcios MD   linagliptin (TRADJENTA) 5 MG tablet Take 1 tablet by mouth daily 3/29/21   Alondra Turcios MD   ondansetron (ZOFRAN) 4 MG tablet Take 1 tablet by mouth every 8 hours as needed for Nausea or Vomiting 18   Janelle Garcia MD   Lancets MISC 1 each by Does not apply route daily 18   Janelle Garcia MD   glucose monitoring kit (FREESTYLE) monitoring kit 1 each by Does not apply route once for 1 dose.  13  Jenae Michelle MD       Current medications:    Current Facility-Administered Medications   Medication Dose Route Frequency Provider Last Rate Last Admin    0.9 % sodium chloride infusion   Intravenous Continuous Nadia Moore  mL/hr at 05/15/21 0836 Rate Change at 05/15/21 0836    clindamycin (CLEOCIN) 900 mg in dextrose 5 % 50 mL IVPB  900 mg Intravenous Q8H Ana Joe II, MD        piperacillin-tazobactam (ZOSYN) 4,500 mg in dextrose 5 % 100 mL IVPB (mini-bag)  4,500 mg Intravenous Q8H Ana Joe II, MD        ondansetron (ZOFRAN-ODT) disintegrating tablet 4 mg  4 mg Oral Q8H PRN José Luis Izquierdo MD        insulin lispro (HUMALOG) injection vial 25 Units  25 Units Subcutaneous TID WC Janna Henson MD   25 Units at 05/15/21 1247    aspirin EC tablet 81 mg  81 mg Oral Daily José Luis Izquierdo MD   81 mg at 05/15/21 0832    glucose (GLUTOSE) 40 % oral gel 15 g  15 g Oral PRN José Luis Izquierdo MD        dextrose 50 % IV solution  12.5 g Intravenous PRN Janna Henson MD        glucagon (rDNA) injection 1 mg  1 mg Intramuscular PRN José Luis Izquierdo MD        dextrose 5 % solution  100 mL/hr Intravenous PRN Janna Henson MD        sodium chloride flush 0.9 % injection 5-40 mL  5-40 mL Intravenous 2 times per day Janna Henson MD   10 mL at 05/13/21 2118    sodium chloride flush 0.9 % injection 5-40 mL  5-40 mL Intravenous PRN Janna Henson MD        0.9 % sodium chloride infusion  25 mL Intravenous PRN Janna Henson MD        potassium chloride (KLOR-CON M) extended release tablet 40 mEq  40 mEq Oral PRN Janna Henson MD        Or    potassium bicarb-citric acid (EFFER-K) effervescent tablet 40 mEq  40 mEq Oral PRN Janna Henson MD        Or    potassium chloride 10 mEq/100 mL IVPB (Peripheral Line)  10 mEq Intravenous PRN Janna Henson MD        magnesium sulfate 2000 mg in 50 mL IVPB premix  2,000 mg Intravenous PRN José Luis Izquierdo MD        promethazine (PHENERGAN) tablet 12.5 mg  12.5 mg Oral Q6H PRN José Luis Izquierdo MD        Or    ondansetron (ZOFRAN) injection 4 mg  4 mg Intravenous Q6H PRN José Luis Izquierdo MD   4 mg at 05/14/21 0533    polyethylene glycol (GLYCOLAX) packet 17 g  17 g Oral Daily PRN José Luis Izquierdo MD        fleet rectal enema 1 enema  1 enema Rectal Daily PRN José Luis Izquierdo MD        nicotine (NICODERM CQ) 21 MG/24HR 1 patch  1 patch Transdermal Daily PRN José Luis Izquierdo MD        acetaminophen (TYLENOL) tablet 650 mg  650 mg Oral Q6H PRN José Luis Izquierdo MD   650 mg at 05/14/21 0482    Or    acetaminophen (TYLENOL) suppository 650 mg  650 mg Rectal Q6H PRN José Luis Izquierdo MD        enoxaparin (LOVENOX) injection 30 mg  30 mg Subcutaneous Daily José Luis Izquierdo MD   30 mg at 05/15/21 0832    zolpidem (AMBIEN) tablet 5 mg  5 mg Oral Nightly PRN José uLis Izquierdo MD   5 mg at 05/14/21 2014    guaiFENesin-dextromethorphan (ROBITUSSIN DM) 100-10 MG/5ML syrup 10 mL  10 mL Oral Q6H PRN José Luis Izquierdo MD        benzonatate (TESSALON) capsule 200 mg  200 mg Oral TID PRN José Luis Izquierdo MD        guaiFENesin (MUCINEX) extended release tablet 1,200 mg  1,200 mg Oral BID Jose Eckert MD   1,200 mg at 05/15/21 5514    calcium carbonate (TUMS) chewable tablet 750 mg  750 mg Oral TID PRN José Luis Izquierdo MD        tiZANidine (ZANAFLEX) tablet 4 mg  4 mg Oral Q8H PRN José Luis Izquierdo MD        vancomycin (VANCOCIN) intermittent dosing (placeholder)   Other See Admin Instructions Jose Eckert MD        insulin glargine (LANTUS) injection vial 60 Units  60 Units Subcutaneous Nightly Jose Eckert MD   60 Units at 05/13/21 2126    oxyCODONE-acetaminophen (PERCOCET) 5-325 MG per tablet 1 tablet  1 tablet Oral Q4H PRN Jose Eckert MD   1 tablet at 05/15/21 1110    morphine sulfate (PF) injection 4 mg  4 mg Intravenous Q3H PRN José Luis Izquierdo MD   4 mg at 05/15/21 1302       Allergies:     Allergies   Allergen Reactions    Adhesive Tape Rash    Doxycycline Nausea And Vomiting    Reglan [Metoclopramide] Anxiety       Problem List:    Patient Active Problem List   Diagnosis Code    Hiatal hernia K44.9    Diabetes mellitus type 2, improved controlled E11.65    Diabetic neuropathy (HCC) E11.40    Panic attack F41.0    Anxiety associated with depression F41.8    Neck pain M54.2    DANIELA (obstructive sleep apnea) G47.33    Urinary frequency R35.0    Bilateral carpal tunnel syndrome- left worse than right G56.03    Hyperlipidemia with target LDL less than 100 E78.5    HTN, goal below 140/90 I10    Bronchitis J40    Osteomyelitis (HCC) M86.9    Diabetic ulcer of left midfoot (HCC) E11.621, L97.429    WD-Diabetic ulcer of left midfoot associated with type 2 diabetes mellitus, with fat layer exposed ([address] E11.621, L97.422    Abscess N47.35    Periumbilical hernia R68.6    WD-Wound, surgical, infected, initial encounter [id number]    Sepsis ([address] A41.9    Cellulitis of left foot L03. 116    Constipation K59.00    Intractable nausea and vomiting R11.2    Uncontrolled type 2 diabetes mellitus (HCC) E11.65    Nausea and vomiting R11.2    Diabetic foot infection (HCC) E11.628, L08.9    Acute renal failure (ARF) (HCC) N17.9    Osteomyelitis of fourth toe of right foot (HCC) M86.9    Cellulitis L03.90    Cellulitis of left arm L03.114    Cellulitis, scrotum N49.2    Acute epididymitis N45.1       Past Medical History:        Diagnosis Date    Abscess 2010    scrotal    Acute renal failure (ARF) ([address] 8/4/2019    Anxiety associated with depression     Anxiety associated with depression     Back pain 7/2/2012    Chest pain 5/1/2013    Diabetic nephropathy ([address]     Diabetic neuropathy ([address] 12/22/2011    Diabetic ulcer of left midfoot associated with type 2 diabetes mellitus, with fat layer exposed ([address] 7/18/2017    Diverticulosis     C scope + Dr. Usha Stevens DM (diabetes mellitus), type 2 ([address] 2002    DR. Turner podiatry    Hyperlipidemia LDL goal < 100 7/15/2013    Hypertension     Internal hemorrhoid     C scope + Dr. Rogel Life fracture 1988    Panic attacks     Pericarditis 2003    Hospitalized with s/p heart cath normal    Peripheral autonomic neuropathy due to diabetes mellitus ([address]     Axonal EMG- NCS, March 2011    Sebaceous cyst 9/1/2011    URI (upper respiratory infection) 2/27/2012    WD-Wound, surgical, infected, initial encounter 12/8/2017    Wrist fracture 1986       Past Surgical History:        Procedure Laterality Date    ABDOMEN SURGERY      CARDIAC CATHETERIZATION  2003, 2013    Normal (Dr. Suman Wilcox)    COLONOSCOPY  6/2/2011    Pandiverticulosis, Nonbleeding internal hemorrhoids, Repeat colonoscopy at age 48- Dr. Nagi Sparks    \"had reconstruction surgery on nose a year after the other nose surgery\"    OTHER SURGICAL HISTORY Right 05/22/2015    I & D right great toe with partial amputation    OTHER SURGICAL HISTORY  12/04/2017    inc and drainage of abcess    UT DEEP INCIS FOOT BONE INFECTN Left 9/22/2018    LEFT FOOT DEBRIDEMENT INCISION AND DRAINAGE TOP AND BOTTOM performed by Kyle Guzman DPM at [address] [place] [address] [id number]    sebaceous cyst removal times 4     TOE AMPUTATION      right great toe    TOE AMPUTATION Right 3/23/2019    TOE AMPUTATION RIGHT 5TH TOE performed by Kyle Guzman DPM at [address] Right 8/6/2019    TOE AMPUTATION RIGHT 4TH TOE performed by Kyle Guzman DPM at [address] ENDOSCOPY  06/2/2011    Mild gastritis with moderatley severe antritis, small hiatal hernia, Dr. Britta Sharma N/A 1/12/2019    EGD BIOPSY performed by Kirby Arrington MD at [facility] ENDOSCOPY       Social History:    Social History     Tobacco Use    Smoking status: Former Smoker     Years: 20.00     Quit date: 11/18/2017     Years since quitting: 3.4    Smokeless tobacco: Never Used   Substance Use Topics    Alcohol use: Yes     Comment: occ                                Counseling given: Not Answered      Vital Signs (Current):   Vitals:    05/15/21 0523 05/15/21 0936 05/15/21 1201 05/15/21 1430   BP: 136/84  138/64 131/78   Pulse: 96  96 98   Resp: 22 20 21   Temp: 37.6 °C (99.7 °F)  37.1 °C (98.7 °F) 36.9 °C (98.4 °F)   TempSrc: Oral  Oral Oral   SpO2: 94% 94% 97% 96%   Weight:       Height:                                                  BP Readings from Last 3 Encounters:   05/15/21 131/78   04/04/21 (!) 177/99   03/29/21 (!) 146/83       NPO Status: Time of last liquid consumption: 1200 Time of last solid consumption: 1200                        Date of last liquid consumption: 05/15/21                        Date of last solid food consumption: 05/15/21    BMI:   Wt Readings from Last 3 Encounters:   05/13/21 290 lb 1.6 oz (131.6 kg)   04/04/21 260 lb 4.8 oz (118.1 kg)   03/26/21 274 lb 12.8 oz (124.6 kg)     Body mass index is 42.84 kg/m². CBC:   Lab Results   Component Value Date    WBC 15.1 05/15/2021    RBC 3.14 05/15/2021    HGB 8.9 05/15/2021    HCT 29.0 05/15/2021    MCV 92.4 05/15/2021    RDW 12.9 05/15/2021     05/15/2021       CMP:   Lab Results   Component Value Date     05/15/2021    K 4.5 05/15/2021    CL 96 05/15/2021    CO2 16 05/15/2021    BUN 45 05/15/2021    CREATININE 3.3 05/15/2021    GFRAA 25 05/15/2021    LABGLOM 20 05/15/2021    GLUCOSE 135 05/15/2021    PROT 5.8 04/09/2021    PROT 7.1 04/11/2012    CALCIUM 7.7 05/15/2021    BILITOT 0.2 04/09/2021    ALKPHOS 58 04/09/2021    AST 10 04/09/2021    ALT 11 04/09/2021       POC Tests:   Recent Labs     05/15/21  1124   POCGLU 209*       Coags:   Lab Results   Component Value Date    PROTIME 11.8 03/22/2019    PROTIME 12.5 12/13/2010    INR 1.02 03/22/2019    APTT 30.8 03/22/2019       HCG (If Applicable): No results found for: PREGTESTUR, PREGSERUM, HCG, HCGQUANT     ABGs:   Lab Results   Component Value Date    PO2ART 119 06/18/2013    WYL6ABD 47.0 06/18/2013    DSA3RRZ 24.2 06/18/2013    BEART 2 06/18/2013        Type & Screen (If Applicable):  No results found for: LABABO, LABRH    Drug/Infectious Status (If Applicable):  Lab Results   Component Value Date    HEPCAB NON REACTIVE 03/28/2021       COVID-19 Screening (If Applicable):   Lab Results   Component Value Date    COVID19 NOT DETECTED 05/15/2021           Anesthesia Evaluation  Patient summary reviewed  Airway: Mallampati: III       Comment: beard   Dental:      Comment: Very poor dentition and periodontal disease.  Multiple loose, chipped, and broken teeth.    Pulmonary:normal exam    (+) sleep apnea:                             Cardiovascular:  Exercise tolerance: good (>4 METS),   (+) hypertension:, angina:,             Echocardiogram reviewed               ROS comment: Left ventricular systolic function is normal.   Ejection fraction is visually estimated at 55-60%. Mild left ventricular hypertrophy. Mildly dilated right ventricle. RVSP of 20 mmHG   Sclerotic, but non-stenotic aortic valve. No evidence of any pericardial effusion. Neuro/Psych:   (+) depression/anxiety             GI/Hepatic/Renal:   (+) hiatal hernia, morbid obesity          Endo/Other:    (+) DiabetesType II DM, poorly controlled, , .          Pt had no PAT visit       Abdominal:   (+) obese,         Vascular:                                      Anesthesia Plan      general     ASA 3 - emergent     (Risks discussed including elevated elevated aspiration risk )  Induction: intravenous. MIPS: Postoperative opioids intended and Prophylactic antiemetics administered. Anesthetic plan and risks discussed with patient.                       Damari De La Cruz, APPLE - CRNA   5/15/2021

## 2021-05-15 NOTE — PROGRESS NOTES
Pt to room 2126 from OR, vital signs obtained. Pt has nasal airway in, can remove when patient is awake,per anesthesia. Pt resting with eyes closed.

## 2021-05-15 NOTE — PROGRESS NOTES
Nephrology Progress Note  5/15/2021 7:31 AM  Subjective: Interval History: Alejandro Serrano is a 55 y.o. male with weakness and ongoing pain in the groin area awaiting urology evaluation for treatment options. Maintain antibiotic therapy for now        Data:   Scheduled Meds:   insulin lispro  25 Units Subcutaneous TID WC    aspirin  81 mg Oral Daily    sodium chloride flush  5-40 mL Intravenous 2 times per day    enoxaparin  30 mg Subcutaneous Daily    guaiFENesin  1,200 mg Oral BID    cefTRIAXone (ROCEPHIN) IV  1,000 mg Intravenous Q24H    vancomycin (VANCOCIN) intermittent dosing (placeholder)   Other See Admin Instructions    insulin glargine  60 Units Subcutaneous Nightly     Continuous Infusions:   dextrose      sodium chloride Stopped (05/14/21 2310)    sodium chloride           CBC   Recent Labs     05/13/21 0058 05/14/21  0732   WBC 16.0* 12.5*   HGB 9.9* 9.1*   HCT 29.0* 27.7*    178      BMP   Recent Labs     05/13/21 0058 05/14/21  0732   * 128*   K 4.0 4.0   CL 99 97*   CO2 21 19*   BUN 33* 40*   CREATININE 2.8* 3.2*     Hepatic: No results for input(s): AST, ALT, ALB, BILITOT, ALKPHOS in the last 72 hours. Troponin: No results for input(s): TROPONINI in the last 72 hours. BNP: No results for input(s): BNP in the last 72 hours. Lipids:   Recent Labs     05/13/21  1028   CHOL 145   HDL 29*     ABGs:   Lab Results   Component Value Date    PO2ART 119 06/18/2013    EDM6ROE 47.0 06/18/2013     INR: No results for input(s): INR in the last 72 hours.   Renal Labs  Albumin:    Lab Results   Component Value Date    LABALBU 2.7 04/09/2021    LABALBU 48 03/27/2021     Calcium:    Lab Results   Component Value Date    CALCIUM 7.8 05/14/2021     Phosphorus:    Lab Results   Component Value Date    PHOS 4.2 03/29/2021     U/A:    Lab Results   Component Value Date    NITRU NEGATIVE 05/13/2021    COLORU ALYSSA 05/13/2021    PHUR 6.5 06/20/2013    WBCUA 12 05/13/2021    RBCUA <1 05/13/2021    MUCUS OCCASIONAL 05/13/2021    TRICHOMONAS NONE SEEN 05/13/2021    BACTERIA RARE 05/13/2021    CLARITYU SLIGHTLY CLOUDY 05/13/2021    SPECGRAV 1.022 05/13/2021    UROBILINOGEN 2.0 05/13/2021    BILIRUBINUR NEGATIVE 05/13/2021    BILIRUBINUR neg 06/20/2013    BLOODU SMALL 05/13/2021    GLUCOSEU 1,000 06/20/2013    KETUA NEGATIVE 05/13/2021    AMORPHOUS RARE 03/26/2021     ABG:    Lab Results   Component Value Date    VXE1CAD 47.0 06/18/2013    PO2ART 119 06/18/2013    TZY4IEI 24.2 06/18/2013    BEART 2 06/18/2013     HgBA1c:    Lab Results   Component Value Date    LABA1C 9.7 05/13/2021     Microalbumen/Creatinine ratio:  No components found for: RUCREAT  TSH:  No results found for: TSH  IRON:  No results found for: IRON  Iron Saturation:  No components found for: PERCENTFE  TIBC:  No results found for: TIBC  FERRITIN:  No results found for: FERRITIN  RPR:  No results found for: RPR  LOVE:    Lab Results   Component Value Date    LOVE None Detected 03/28/2021     24 Hour Urine for Creatinine Clearance:  No components found for: CREAT4, UHRS10, UTV10      Objective:   I/O: 05/14 0701 - 05/15 0700  In: 300 [I.V.:300]  Out: 1350 [SRPWD:5945]  I/O last 3 completed shifts: In: 300 [I.V.:300]  Out: 1350 [Urine:1350]  No intake/output data recorded. Vitals: /84   Pulse 96   Temp 99.7 °F (37.6 °C) (Oral)   Resp 22   Ht 5' 9\" (1.753 m)   Wt 290 lb 1.6 oz (131.6 kg)   SpO2 94%   BMI 42.84 kg/m²  {  General appearance: awake weak  HEENT: Head: Normal, normocephalic, atraumatic. Neck: supple, symmetrical, trachea midline  Lungs: diminished breath sounds bilaterally  Heart: S1, S2 normal  Abdomen: abnormal findings:  soft nt obese  Extremities: edema trace scrotal edema with tenderness. Neurologic: Mental status: alertness: alert anxious        Assessment and Plan:      IMP:  #1 acute renal failure on CKD 3A. With nephrotic range proteinuria. 2.  Acute epididymitis/orchitis.   3.  Type 2 diabetes uncontrolled  4. Hypertension  5. Overweight  6. Hyponatremia  7. Anemia    Plan     #1 renal function above setting would need to try to make renal improve before try to be more aggressive with ACE inhibitor's ARB use with proteinuria. Will monitor for the etiology is most likely from diabetes in the setting of acute infection. 2.  Sliding-scale insulin and try to make tighter control sugar  3. Make sure urology is consulted to evaluate treatment options including surgical if not improved  4. Blood pressures appear stable  5. Need to discuss with him better diet and make he work on weight loss. 6.  Monitor sodium with treatment of infection with hydration  7.   Monitor hemoglobin         Pauline Paris MD

## 2021-05-15 NOTE — PROGRESS NOTES
[address] [facility]  consulted by Dr. Vishnu Tapia for monitoring and adjustment. Indication for treatment: SSTI  Goal trough: 10-15 mcg/mL  AUC/PAMELA: <500    Pertinent Laboratory Values:   Temp Readings from Last 3 Encounters:   05/15/21 99.7 °F (37.6 °C) (Oral)   04/04/21 98.1 °F (36.7 °C) (Oral)   03/29/21 98.8 °F (37.1 °C) (Oral)     Recent Labs     05/13/21 0058 05/13/21  0440 05/14/21  0732 05/15/21  0657   WBC 16.0*  --  12.5* 15.1*   LACTATE  --  1.5  --   --      Recent Labs     05/13/21  0058 05/14/21  0732 05/15/21  0657   BUN 33* 40* 45*   CREATININE 2.8* 3.2* 3.3*     Estimated Creatinine Clearance: 38 mL/min (A) (based on SCr of 3.3 mg/dL (H)). Intake/Output Summary (Last 24 hours) at 5/15/2021 0853  Last data filed at 5/14/2021 2310  Gross per 24 hour   Intake 300 ml   Output 700 ml   Net -400 ml       Pertinent Cultures:  Date    Source    Results  5/13   Blood    Ordered    Vancomycin level:   TROUGH:  No results for input(s): VANCOTROUGH in the last 72 hours. RANDOM:    Recent Labs     05/14/21  0732 05/15/21  0657   VANCORANDOM 13.6 8.8       Assessment:  · WBC and temperature: WBC trending up today @ 15.1;  Tmax = 99.7F  · SCr, BUN, and urine output:   · baseline Scr is elevated, appears in CARMEN; CKD3  · Scr trend worsening 2.8 -> 3.3  · Day(s) of therapy: # 3  · Vancomycin concentration:   · 5/14: 13.6, therapeutic  · 5/15:  8.8, OK to re-dose    Plan:  · CARMEN - CKD3: Intermittent vancomycin dosing based on levels while in CARMEN  · Random level is sub-therapeutic @ 8.8, last dose Vancomycin given on 5/13  · Ordered Vancomycin 1500mg IVPB x 1 dose today  · Repeat random level tomorrow AM  · Pharmacy will continue to monitor patient and adjust therapy as indicated    Marleni 3 5/16 @ 0600    Thank you for the consult,  Ming Storey [facility]   5/15/2021 8:53 AM

## 2021-05-15 NOTE — ANESTHESIA POSTPROCEDURE EVALUATION
Department of Anesthesiology  Postprocedure Note    Patient: Randy Woodson  MRN: [id number]  YOB: 1975  Date of evaluation: 5/15/2021  Time:  4:53 PM     Procedure Summary     Date: 05/15/21 Room / Location: [place] [address] / [facility]    Anesthesia Start: 9054 Anesthesia Stop: 4270    Procedure: SCROTAL AND PERINEAL DEBRIDEMENT (N/A Scrotum) Diagnosis: (scrotum pain)    Surgeons: Yamileth Agosto MD Responsible Provider: Arleen Vicente MD    Anesthesia Type: general ASA Status: 3 - Emergent          Anesthesia Type: general    Marcus Phase I:      Marcus Phase II:      Last vitals: Reviewed and per EMR flowsheets.        Anesthesia Post Evaluation    Patient location during evaluation: ICU  Patient participation: waiting for patient participation  Level of consciousness: sleepy but conscious  Airway patency: patent  Nausea & Vomiting: no vomiting  Complications: no  Cardiovascular status: hemodynamically stable  Respiratory status: acceptable, spontaneous ventilation, nonlabored ventilation, face mask and nasal airway  Hydration status: stable

## 2021-05-15 NOTE — PLAN OF CARE
Problem: Falls - Risk of:  Goal: Will remain free from falls  Description: Will remain free from falls  Outcome: Ongoing  Goal: Absence of physical injury  Description: Absence of physical injury  Outcome: Ongoing     Problem: Pain:  Goal: Pain level will decrease  Description: Pain level will decrease  Outcome: Ongoing  Goal: Control of acute pain  Description: Control of acute pain  Outcome: Ongoing  Goal: Control of chronic pain  Description: Control of chronic pain  Outcome: Ongoing     Problem: Discharge Planning:  Goal: Discharged to appropriate level of care  Description: Discharged to appropriate level of care  Outcome: Ongoing     Problem:  Body Temperature - Imbalanced:  Goal: Ability to maintain a body temperature in the normal range will improve  Description: Ability to maintain a body temperature in the normal range will improve  Outcome: Ongoing     Problem: Mobility - Impaired:  Goal: Mobility will improve to maximum level  Description: Mobility will improve to maximum level  Outcome: Ongoing     Problem: Pain:  Goal: Pain level will decrease  Description: Pain level will decrease  Outcome: Ongoing  Goal: Control of acute pain  Description: Control of acute pain  Outcome: Ongoing  Goal: Control of chronic pain  Description: Control of chronic pain  Outcome: Ongoing     Problem: Skin Integrity - Impaired:  Goal: Will show no infection signs and symptoms  Description: Will show no infection signs and symptoms  Outcome: Ongoing  Goal: Absence of new skin breakdown  Description: Absence of new skin breakdown  Outcome: Ongoing      Electronically signed by Shahriar Field RN on 5/15/21 at 4:34 AM EDT

## 2021-05-15 NOTE — OP NOTE
Operating Report    Patient ID:  Carlos Current  [id number]  14 y.o.  1975    Date of Procedure: 05/15/21    Surgeon: Marcin Gutierrez MD, FACS    Co-surgeon: Emmanuel Hawthorne MD,    Anesthesia: General    ASA: Per anesthesia    IVF: Per anesthesia     EBL: 30 mL    Specimen(s): 1. Perineal culture     2. 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 Operation:  Excisional debridement down to muscle  Total square cm area (315 sq cm)    Findings:  Consistent with post operative dx    Procedure Details: The risk, benefits, expected outcome, and alternative to the recommended procedure have been discussed with the patient. Patient understands and wants to proceed with the procedure. Procedure: The risks, benefits, alternatives were discussed with the patient. After agreeing to proceed, the patient was transported to the operating room. The patient was transferred to the operating room table and positioned appropriately with carefull attention to all pressure points. Safety straps were placed. General anesthesia was induced without complication. The patient was transferred to the lithotomy position. An approved timeout was held with all members of the team present and in agreement. The patient had appropriate antibiotics administered in accordance with national protocols. The perineal wound was cleansed with betadine and draped in a sterile fashion. An Excisional Debridement down to muscle was completed removing all necrotic and dead tissue until healthy tissue planes with good blood supply were observed. Please refer to Dr. Sharri Shore operative report for the scrotal exploration part of this procedure. The perineal wound measured 21 cm x 15 cm for a total area of 315 sq cm.      A rectal exam was performed and there were no abnormalities appreciated. The wound was irrigated and was checked for hemostasis. It was hemostatic. The wound was packed with betadine moistened gauze and covered with ABD and tape. At the end of the case, the patient was extubated and transported to the ICU. At the end of the case, the surgical counts were correct. At the end of the case, Dr. Jorge Arriola personally informed the patient's family of the outcome of the procedure. Both Les Campbell and Jorge Arriola were present, supervised, and scrubbed for the duration of the case.        Hugo Barragan MD, FACS

## 2021-05-16 ENCOUNTER — ANESTHESIA (OUTPATIENT)
Dept: OPERATING ROOM | Age: 46
DRG: 853 | End: 2021-05-16
Payer: MEDICARE

## 2021-05-16 ENCOUNTER — ANESTHESIA EVENT (OUTPATIENT)
Dept: OPERATING ROOM | Age: 46
DRG: 853 | End: 2021-05-16
Payer: MEDICARE

## 2021-05-16 VITALS
OXYGEN SATURATION: 100 % | TEMPERATURE: 98.6 F | SYSTOLIC BLOOD PRESSURE: 99 MMHG | DIASTOLIC BLOOD PRESSURE: 73 MMHG | RESPIRATION RATE: 15 BRPM

## 2021-05-16 LAB
ANION GAP SERPL CALCULATED.3IONS-SCNC: 16 MMOL/L (ref 4–16)
BUN BLDV-MCNC: 51 MG/DL (ref 6–23)
CALCIUM SERPL-MCNC: 7.7 MG/DL (ref 8.3–10.6)
CHLORIDE BLD-SCNC: 95 MMOL/L (ref 99–110)
CO2: 18 MMOL/L (ref 21–32)
CREAT SERPL-MCNC: 3.7 MG/DL (ref 0.9–1.3)
CULTURE: NORMAL
DOSE AMOUNT: NORMAL
DOSE TIME: NORMAL
GFR AFRICAN AMERICAN: 22 ML/MIN/1.73M2
GFR NON-AFRICAN AMERICAN: 18 ML/MIN/1.73M2
GLUCOSE BLD-MCNC: 187 MG/DL (ref 70–99)
GLUCOSE BLD-MCNC: 198 MG/DL (ref 70–99)
GLUCOSE BLD-MCNC: 242 MG/DL (ref 70–99)
GLUCOSE BLD-MCNC: 274 MG/DL (ref 70–99)
GLUCOSE BLD-MCNC: 280 MG/DL (ref 70–99)
HCT VFR BLD CALC: 28.1 % (ref 42–52)
HEMOGLOBIN: 8.7 GM/DL (ref 13.5–18)
Lab: NORMAL
MCH RBC QN AUTO: 28.8 PG (ref 27–31)
MCHC RBC AUTO-ENTMCNC: 31 % (ref 32–36)
MCV RBC AUTO: 93 FL (ref 78–100)
PDW BLD-RTO: 13.2 % (ref 11.7–14.9)
PLATELET # BLD: 252 K/CU MM (ref 140–440)
PMV BLD AUTO: 10.2 FL (ref 7.5–11.1)
POTASSIUM SERPL-SCNC: 4.3 MMOL/L (ref 3.5–5.1)
RBC # BLD: 3.02 M/CU MM (ref 4.6–6.2)
SODIUM BLD-SCNC: 129 MMOL/L (ref 135–145)
SPECIMEN: NORMAL
VANCOMYCIN RANDOM: 18.2 UG/ML
WBC # BLD: 14.9 K/CU MM (ref 4–10.5)

## 2021-05-16 PROCEDURE — 2709999900 HC NON-CHARGEABLE SUPPLY: Performed by: UROLOGY

## 2021-05-16 PROCEDURE — 0KBM0ZZ EXCISION OF PERINEUM MUSCLE, OPEN APPROACH: ICD-10-PCS | Performed by: UROLOGY

## 2021-05-16 PROCEDURE — 36415 COLL VENOUS BLD VENIPUNCTURE: CPT

## 2021-05-16 PROCEDURE — 2580000003 HC RX 258: Performed by: SURGERY

## 2021-05-16 PROCEDURE — 6360000002 HC RX W HCPCS: Performed by: SURGERY

## 2021-05-16 PROCEDURE — 80202 ASSAY OF VANCOMYCIN: CPT

## 2021-05-16 PROCEDURE — 6360000002 HC RX W HCPCS: Performed by: INTERNAL MEDICINE

## 2021-05-16 PROCEDURE — 82962 GLUCOSE BLOOD TEST: CPT

## 2021-05-16 PROCEDURE — 2500000003 HC RX 250 WO HCPCS: Performed by: NURSE ANESTHETIST, CERTIFIED REGISTERED

## 2021-05-16 PROCEDURE — 94664 DEMO&/EVAL PT USE INHALER: CPT

## 2021-05-16 PROCEDURE — 3600000002 HC SURGERY LEVEL 2 BASE: Performed by: UROLOGY

## 2021-05-16 PROCEDURE — 6370000000 HC RX 637 (ALT 250 FOR IP): Performed by: SURGERY

## 2021-05-16 PROCEDURE — 80048 BASIC METABOLIC PNL TOTAL CA: CPT

## 2021-05-16 PROCEDURE — 3700000001 HC ADD 15 MINUTES (ANESTHESIA): Performed by: UROLOGY

## 2021-05-16 PROCEDURE — 11042 DBRDMT SUBQ TIS 1ST 20SQCM/<: CPT | Performed by: SURGERY

## 2021-05-16 PROCEDURE — 2580000003 HC RX 258: Performed by: NURSE ANESTHETIST, CERTIFIED REGISTERED

## 2021-05-16 PROCEDURE — P9047 ALBUMIN (HUMAN), 25%, 50ML: HCPCS | Performed by: INTERNAL MEDICINE

## 2021-05-16 PROCEDURE — 1200000000 HC SEMI PRIVATE

## 2021-05-16 PROCEDURE — 94150 VITAL CAPACITY TEST: CPT

## 2021-05-16 PROCEDURE — 3700000000 HC ANESTHESIA ATTENDED CARE: Performed by: UROLOGY

## 2021-05-16 PROCEDURE — 2700000000 HC OXYGEN THERAPY PER DAY

## 2021-05-16 PROCEDURE — 6360000002 HC RX W HCPCS: Performed by: NURSE ANESTHETIST, CERTIFIED REGISTERED

## 2021-05-16 PROCEDURE — 2500000003 HC RX 250 WO HCPCS: Performed by: SURGERY

## 2021-05-16 PROCEDURE — 94761 N-INVAS EAR/PLS OXIMETRY MLT: CPT

## 2021-05-16 PROCEDURE — 3600000012 HC SURGERY LEVEL 2 ADDTL 15MIN: Performed by: UROLOGY

## 2021-05-16 PROCEDURE — 11045 DBRDMT SUBQ TISS EACH ADDL: CPT | Performed by: SURGERY

## 2021-05-16 PROCEDURE — 85027 COMPLETE CBC AUTOMATED: CPT

## 2021-05-16 RX ORDER — LIDOCAINE HYDROCHLORIDE 20 MG/ML
INJECTION, SOLUTION INTRAVENOUS PRN
Status: DISCONTINUED | OUTPATIENT
Start: 2021-05-16 | End: 2021-05-16 | Stop reason: SDUPTHER

## 2021-05-16 RX ORDER — SODIUM CHLORIDE 9 MG/ML
INJECTION, SOLUTION INTRAVENOUS CONTINUOUS PRN
Status: DISCONTINUED | OUTPATIENT
Start: 2021-05-16 | End: 2021-05-16 | Stop reason: SDUPTHER

## 2021-05-16 RX ORDER — ONDANSETRON 2 MG/ML
INJECTION INTRAMUSCULAR; INTRAVENOUS PRN
Status: DISCONTINUED | OUTPATIENT
Start: 2021-05-16 | End: 2021-05-16 | Stop reason: SDUPTHER

## 2021-05-16 RX ORDER — ALBUMIN (HUMAN) 12.5 G/50ML
25 SOLUTION INTRAVENOUS ONCE
Status: COMPLETED | OUTPATIENT
Start: 2021-05-16 | End: 2021-05-16

## 2021-05-16 RX ORDER — KETAMINE HCL 50MG/ML(1)
SYRINGE (ML) INTRAVENOUS PRN
Status: DISCONTINUED | OUTPATIENT
Start: 2021-05-16 | End: 2021-05-16 | Stop reason: SDUPTHER

## 2021-05-16 RX ORDER — FENTANYL CITRATE 50 UG/ML
INJECTION, SOLUTION INTRAMUSCULAR; INTRAVENOUS PRN
Status: DISCONTINUED | OUTPATIENT
Start: 2021-05-16 | End: 2021-05-16 | Stop reason: SDUPTHER

## 2021-05-16 RX ORDER — PROPOFOL 10 MG/ML
INJECTION, EMULSION INTRAVENOUS PRN
Status: DISCONTINUED | OUTPATIENT
Start: 2021-05-16 | End: 2021-05-16 | Stop reason: SDUPTHER

## 2021-05-16 RX ORDER — FUROSEMIDE 10 MG/ML
40 INJECTION INTRAMUSCULAR; INTRAVENOUS ONCE
Status: COMPLETED | OUTPATIENT
Start: 2021-05-16 | End: 2021-05-16

## 2021-05-16 RX ADMIN — OXYCODONE HYDROCHLORIDE AND ACETAMINOPHEN 1 TABLET: 5; 325 TABLET ORAL at 19:37

## 2021-05-16 RX ADMIN — SODIUM CHLORIDE: 900 INJECTION INTRAVENOUS at 10:37

## 2021-05-16 RX ADMIN — DEXTROSE MONOHYDRATE 900 MG: 50 INJECTION, SOLUTION INTRAVENOUS at 06:58

## 2021-05-16 RX ADMIN — SODIUM BICARBONATE: 84 INJECTION, SOLUTION INTRAVENOUS at 23:21

## 2021-05-16 RX ADMIN — DEXTROSE MONOHYDRATE 900 MG: 50 INJECTION, SOLUTION INTRAVENOUS at 19:11

## 2021-05-16 RX ADMIN — ONDANSETRON 4 MG: 2 INJECTION INTRAMUSCULAR; INTRAVENOUS at 11:04

## 2021-05-16 RX ADMIN — LIDOCAINE HYDROCHLORIDE 100 MG: 20 INJECTION, SOLUTION INTRAVENOUS at 10:42

## 2021-05-16 RX ADMIN — PIPERACILLIN SODIUM AND TAZOBACTAM SODIUM 4500 MG: 4; .5 INJECTION, POWDER, LYOPHILIZED, FOR SOLUTION INTRAVENOUS at 15:16

## 2021-05-16 RX ADMIN — FENTANYL CITRATE 100 MCG: 50 INJECTION, SOLUTION INTRAMUSCULAR; INTRAVENOUS at 11:02

## 2021-05-16 RX ADMIN — FUROSEMIDE 40 MG: 10 INJECTION, SOLUTION INTRAVENOUS at 11:51

## 2021-05-16 RX ADMIN — SODIUM BICARBONATE: 84 INJECTION, SOLUTION INTRAVENOUS at 11:57

## 2021-05-16 RX ADMIN — ZOLPIDEM TARTRATE 5 MG: 5 TABLET ORAL at 22:58

## 2021-05-16 RX ADMIN — HYDROMORPHONE HYDROCHLORIDE 0.5 MG: 1 INJECTION, SOLUTION INTRAMUSCULAR; INTRAVENOUS; SUBCUTANEOUS at 23:11

## 2021-05-16 RX ADMIN — PIPERACILLIN SODIUM AND TAZOBACTAM SODIUM 4500 MG: 4; .5 INJECTION, POWDER, LYOPHILIZED, FOR SOLUTION INTRAVENOUS at 06:30

## 2021-05-16 RX ADMIN — PROPOFOL 180 MG: 10 INJECTION, EMULSION INTRAVENOUS at 10:42

## 2021-05-16 RX ADMIN — INSULIN GLARGINE 60 UNITS: 100 INJECTION, SOLUTION SUBCUTANEOUS at 22:58

## 2021-05-16 RX ADMIN — SODIUM CHLORIDE: 900 INJECTION INTRAVENOUS at 11:02

## 2021-05-16 RX ADMIN — ALBUMIN (HUMAN) 25 G: 0.25 INJECTION, SOLUTION INTRAVENOUS at 11:54

## 2021-05-16 RX ADMIN — INSULIN LISPRO 25 UNITS: 100 INJECTION, SOLUTION INTRAVENOUS; SUBCUTANEOUS at 17:50

## 2021-05-16 RX ADMIN — OXYCODONE HYDROCHLORIDE AND ACETAMINOPHEN 1 TABLET: 5; 325 TABLET ORAL at 13:09

## 2021-05-16 RX ADMIN — Medication 50 MG: at 11:02

## 2021-05-16 RX ADMIN — PIPERACILLIN SODIUM AND TAZOBACTAM SODIUM 4500 MG: 4; .5 INJECTION, POWDER, LYOPHILIZED, FOR SOLUTION INTRAVENOUS at 22:58

## 2021-05-16 RX ADMIN — HYDROMORPHONE HYDROCHLORIDE 0.5 MG: 1 INJECTION, SOLUTION INTRAMUSCULAR; INTRAVENOUS; SUBCUTANEOUS at 18:18

## 2021-05-16 RX ADMIN — HYDROMORPHONE HYDROCHLORIDE 0.25 MG: 1 INJECTION, SOLUTION INTRAMUSCULAR; INTRAVENOUS; SUBCUTANEOUS at 04:04

## 2021-05-16 RX ADMIN — HYDROMORPHONE HYDROCHLORIDE 0.5 MG: 1 INJECTION, SOLUTION INTRAMUSCULAR; INTRAVENOUS; SUBCUTANEOUS at 11:48

## 2021-05-16 RX ADMIN — HYDROMORPHONE HYDROCHLORIDE 0.5 MG: 1 INJECTION, SOLUTION INTRAMUSCULAR; INTRAVENOUS; SUBCUTANEOUS at 08:47

## 2021-05-16 RX ADMIN — GUAIFENESIN 1200 MG: 600 TABLET, EXTENDED RELEASE ORAL at 22:58

## 2021-05-16 ASSESSMENT — PAIN DESCRIPTION - PAIN TYPE
TYPE: ACUTE PAIN
TYPE: ACUTE PAIN;SURGICAL PAIN

## 2021-05-16 ASSESSMENT — PULMONARY FUNCTION TESTS
PIF_VALUE: 6
PIF_VALUE: 6
PIF_VALUE: 18
PIF_VALUE: 18
PIF_VALUE: 5
PIF_VALUE: 18
PIF_VALUE: 8
PIF_VALUE: 17
PIF_VALUE: 8
PIF_VALUE: 8
PIF_VALUE: 1
PIF_VALUE: 7
PIF_VALUE: 6
PIF_VALUE: 9
PIF_VALUE: 18
PIF_VALUE: 2
PIF_VALUE: 10
PIF_VALUE: 19
PIF_VALUE: 10
PIF_VALUE: 9
PIF_VALUE: 0
PIF_VALUE: 10
PIF_VALUE: 8
PIF_VALUE: 7
PIF_VALUE: 18
PIF_VALUE: 18
PIF_VALUE: 2

## 2021-05-16 ASSESSMENT — PAIN - FUNCTIONAL ASSESSMENT
PAIN_FUNCTIONAL_ASSESSMENT: INTOLERABLE, UNABLE TO DO ANY ACTIVE OR PASSIVE ACTIVITIES
PAIN_FUNCTIONAL_ASSESSMENT: PREVENTS OR INTERFERES SOME ACTIVE ACTIVITIES AND ADLS

## 2021-05-16 ASSESSMENT — PAIN SCALES - GENERAL
PAINLEVEL_OUTOF10: 9
PAINLEVEL_OUTOF10: 9
PAINLEVEL_OUTOF10: 8
PAINLEVEL_OUTOF10: 6
PAINLEVEL_OUTOF10: 3
PAINLEVEL_OUTOF10: 6
PAINLEVEL_OUTOF10: 9

## 2021-05-16 ASSESSMENT — PAIN DESCRIPTION - DESCRIPTORS: DESCRIPTORS: BURNING;CONSTANT

## 2021-05-16 ASSESSMENT — PAIN DESCRIPTION - LOCATION: LOCATION: SCROTUM

## 2021-05-16 ASSESSMENT — PAIN DESCRIPTION - ORIENTATION
ORIENTATION: RIGHT;LEFT;INNER
ORIENTATION: RIGHT;INNER

## 2021-05-16 ASSESSMENT — PAIN DESCRIPTION - PROGRESSION: CLINICAL_PROGRESSION: GRADUALLY WORSENING

## 2021-05-16 NOTE — OP NOTE
Operative Note      Patient: Tonya Martínez  YOB: 1975  MRN: [id number]    Date of Procedure: 5/16/2021    Pre-Op Diagnosis: SCROTAL/perineal ABSCESS    Post-Op Diagnosis: Same       Procedure: wound debridement, POD 1    Surgeon(s):  Eloy Harmon MD    Co-surgeon: Dr. Chon Pittman    Anesthesia: General    Estimated Blood Loss (mL): less than 50     Complications: None    Specimens:   none    Implants:  none    Drains:   Urethral Catheter (Active)   $ Urethral catheter insertion Inserted for procedure 05/16/21 0414   Catheter Indications Perioperative use for selected surgical procedures 05/16/21 0414   Site Assessment Pink 05/16/21 0414   Urine Color Yellow 05/16/21 0414   Urine Appearance Cloudy 05/16/21 0414   Output (mL) 1250 mL 05/16/21 0414       Findings: small amount of non-viable tissue resected    Detailed Description of Procedure:     54 yo diabetic male diagnosed with Irene's gangrene on CT 5/15/21 who underwent emergent debridement that same date. In ICU overnight for close nursing care. NPO for wound washout and repeat debridement. Patient has been AF and states his symptoms of pain are much improved from yesterday. D/w pt risks/benefits, all questions answered, consent signed. He was transferred from the ICU to the OR. General anesthesia was administered. He was placed in the dorsal lithotomy position and sterilely prepped and draped in the usual fashion. Time out was taken to ensure this is the proper operations for the proper patient, all agreed. The wound was thoroughly evaluated. Several areas of necrotic tissues were debrided. Meticulous hemostasis was achieved. The wound was copiously irrigated & dressings were placed. All counts were correct at the end of the procedure. He was extubated and taken back to the ICU overnight. Wound care consult will be sought.     Wife Georgie Moody [phone] was contacted postoperatively by phone as she was at work.    Electronically signed by Angela Gilman MD on 5/16/2021 at 10:05 AM

## 2021-05-16 NOTE — PROGRESS NOTES
56 yo diabetic male POD 1 s/p scrotal/perineal debridement    AF on Clindamycin, Zosyn, Vanco    Urine culture 5/15/21 no growth    Surgical cultures pending    CARMEN persists with CR 3.7 up from 2.8 upon admission - good UOP. Consent signed for repeat debridement of wound today. All questions answered, will proceed.     Wife Dimas Gambino [phone]

## 2021-05-16 NOTE — PROGRESS NOTES
Received phone call from Dr. Cecilia Argueta regarding consult, this nurse expressed the need to have to call Dr. Cecilia Argueta back due to currently holing pressure on a groin site, Dr. Cecilia Argueta told this nurse that the consult will have to be done on Monday then.

## 2021-05-16 NOTE — ANESTHESIA PRE PROCEDURE
injection   Intravenous PRN APPLE Acevedo - CRNA   100 mg at 05/16/21 1042    0.9 % sodium chloride infusion   Intravenous Continuous PRN APPLE Acevedo - CRNA   New Bag at 05/16/21 1037    clindamycin (CLEOCIN) 900 mg in dextrose 5 % 50 mL IVPB  900 mg Intravenous Q8H Jf Griffith II, MD   Stopped at 05/16/21 0730    piperacillin-tazobactam (ZOSYN) 4,500 mg in dextrose 5 % 100 mL IVPB (mini-bag)  4,500 mg Intravenous Q8H Jf Griffith II, MD   Stopped at 05/16/21 1042    HYDROmorphone (DILAUDID) injection 0.25 mg  0.25 mg Intravenous Q3H PRN Jf Griffith II, MD   0.25 mg at 05/16/21 0404    Or    HYDROmorphone (DILAUDID) injection 0.5 mg  0.5 mg Intravenous Q3H PRN Jf Griffith II, MD   0.5 mg at 05/16/21 0847    ondansetron (ZOFRAN-ODT) disintegrating tablet 4 mg  4 mg Oral Q8H PRN Jf Griffith II, MD        insulin lispro (HUMALOG) injection vial 25 Units  25 Units Subcutaneous TID WC Jf Griffith II, MD   25 Units at 05/15/21 1247    aspirin EC tablet 81 mg  81 mg Oral Daily Jf Griffith II, MD   81 mg at 05/15/21 0832    glucose (GLUTOSE) 40 % oral gel 15 g  15 g Oral PRN Jf Griffith II, MD        dextrose 50 % IV solution  12.5 g Intravenous PRN Jf Griffith II, MD        glucagon (rDNA) injection 1 mg  1 mg Intramuscular PRN Jf Griffith II, MD        dextrose 5 % solution  100 mL/hr Intravenous PRN Jf Griffith II, MD        sodium chloride flush 0.9 % injection 5-40 mL  5-40 mL Intravenous 2 times per day Jf Griffith II, MD   10 mL at 05/13/21 2118    sodium chloride flush 0.9 % injection 5-40 mL  5-40 mL Intravenous PRN Jf Griffith II, MD        0.9 % sodium chloride infusion  25 mL Intravenous PRN Jf Griffith II, MD        potassium chloride (KLOR-CON M) extended release tablet 40 mEq  40 mEq Oral PRN Jf Griffith II, MD        Or    potassium bicarb-citric acid (EFFER-K) effervescent tablet 40 mEq  40 mEq Oral PRN Kaela Webster MD Or    potassium chloride 10 mEq/100 mL IVPB (Peripheral Line)  10 mEq Intravenous PRN Tee Anderson II, MD        magnesium sulfate 2000 mg in 50 mL IVPB premix  2,000 mg Intravenous PRN Tee Anderson II, MD        promethazine (PHENERGAN) tablet 12.5 mg  12.5 mg Oral Q6H PRN Tee Anderson II, MD        Or    ondansetron [facility]) injection 4 mg  4 mg Intravenous Q6H PRN Tee Anderson II, MD   4 mg at 05/14/21 0533    polyethylene glycol (GLYCOLAX) packet 17 g  17 g Oral Daily PRN Abbe Menjivar MD        fleet rectal enema 1 enema  1 enema Rectal Daily PRN Tee Anderson II, MD        nicotine (NICODERM CQ) 21 MG/24HR 1 patch  1 patch Transdermal Daily PRN Tee Anderson II, MD        acetaminophen (TYLENOL) tablet 650 mg  650 mg Oral Q6H PRN Tee Anderson II, MD   650 mg at 05/14/21 2013    Or    acetaminophen (TYLENOL) suppository 650 mg  650 mg Rectal Q6H PRN Tee Anderson II, MD        enoxaparin (LOVENOX) injection 30 mg  30 mg Subcutaneous Daily Tee Anderson II, MD   30 mg at 05/15/21 5467    zolpidem (AMBIEN) tablet 5 mg  5 mg Oral Nightly PRN Tee Anderson II, MD   5 mg at 05/15/21 2156    guaiFENesin-dextromethorphan (ROBITUSSIN DM) 100-10 MG/5ML syrup 10 mL  10 mL Oral Q6H PRN Tee Anderson II, MD        benzonatate (TESSALON) capsule 200 mg  200 mg Oral TID PRN Tee Anderson II, MD        guaiFENesin [facility]) extended release tablet 1,200 mg  1,200 mg Oral BID Tee Anderson II, MD   1,200 mg at 05/15/21 2109    calcium carbonate (TUMS) chewable tablet 750 mg  750 mg Oral TID PRN Tee Anderson II, MD        tiZANidine (ZANAFLEX) tablet 4 mg  4 mg Oral Q8H PRN Abbe Menjivar MD        vancomycin Jaime Conde) intermittent dosing (placeholder)   Other See Admin Instructions Tee Anderson II, MD        insulin glargine (LANTUS) injection vial 60 Units  60 Units Subcutaneous Nightly Tee Anderson II, MD   60 Units at 05/13/21 2126    oxyCODONE-acetaminophen (PERCOCET) 5-325 MG per tablet 1 tablet  1 tablet Oral Q4H PRN Laureen Murray II, MD   1 tablet at 05/15/21 1110       Allergies: Allergies   Allergen Reactions    Adhesive Tape Rash    Doxycycline Nausea And Vomiting    Reglan [Metoclopramide] Anxiety       Problem List:    Patient Active Problem List   Diagnosis Code    Hiatal hernia K44.9    Diabetes mellitus type 2, improved controlled E11.65    Diabetic neuropathy ([facility]) E11.40    Panic attack F41.0    Anxiety associated with depression F41.8    Neck pain M54.2    DANIELA (obstructive sleep apnea) G47.33    Urinary frequency R35.0    Bilateral carpal tunnel syndrome- left worse than right G56.03    Hyperlipidemia with target LDL less than 100 E78.5    HTN, goal below 140/90 I10    Bronchitis J40    Osteomyelitis ([facility]) M86.9    Diabetic ulcer of left midfoot ([facility]) E11.621, L97.429    WD-Diabetic ulcer of left midfoot associated with type 2 diabetes mellitus, with fat layer exposed ([facility] E11.621, L97.422    Abscess M02.51    Periumbilical hernia V67.8    WD-Wound, surgical, infected, initial encounter [id number]    Sepsis ([facility] A41.9    Cellulitis of left foot L03. 116    Constipation K59.00    Intractable nausea and vomiting R11.2    Uncontrolled type 2 diabetes mellitus ([facility]) E11.65    Nausea and vomiting R11.2    Diabetic foot infection ([facility]) E11.628, L08.9    Acute renal failure (ARF) ([facility]) N17.9    Osteomyelitis of fourth toe of right foot ([facility]) M86.9    Cellulitis L03.90    Cellulitis of left arm L03.114    Cellulitis, scrotum N49.2    Acute epididymitis N45.1    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       Past Medical History:        Diagnosis Date    Abscess 2010    scrotal    Acute renal failure (ARF) ([facility] 8/4/2019    Anxiety associated with depression     Anxiety associated with depression     Back pain 7/2/2012    Chest pain 5/1/2013    Diabetic nephropathy ([facility]     Diabetic neuropathy ([facility] 12/22/2011    Diabetic ulcer of left midfoot associated with type 2 diabetes mellitus, with fat layer exposed ([facility] [address] 7/18/2017    Diverticulosis     C scope + Dr. Usha Stevens DM (diabetes mellitus), type 2 ([facility] [address] 2002    DR. Turner podiatry    Hyperlipidemia LDL goal < 100 7/15/2013    Hypertension     Internal hemorrhoid     C scope + Dr. Darylene Pheasant Panic attacks     Pericarditis 2003    Hospitalized with s/p heart cath normal    Peripheral autonomic neuropathy due to diabetes mellitus ([facility] [address]     Axonal EMG- NCS, March 2011    Sebaceous cyst 9/1/2011    URI (upper respiratory infection) 2/27/2012    WD-Wound, surgical, infected, initial encounter 12/8/2017    Wrist fracture 1986       Past Surgical History:        Procedure Laterality Date   [address] 2003, 2013    Normal (Dr. Suman Wilcox)    COLONOSCOPY  6/2/2011    Pandiverticulosis, Nonbleeding internal hemorrhoids, Repeat colonoscopy at age 48- Dr. Cano    \"had reconstruction surgery on nose a year after the other nose surgery\"    OTHER SURGICAL HISTORY Right 05/22/2015    I & D right great toe with partial amputation    OTHER SURGICAL HISTORY  12/04/2017    inc and drainage of abcess    VA DEEP INCIS FOOT BONE INFECTN Left 9/22/2018    LEFT FOOT DEBRIDEMENT INCISION AND DRAINAGE TOP AND BOTTOM performed by Tika Matthews DPM at [address] [place] [address] [id number]    sebaceous cyst removal times 4     TOE AMPUTATION      right great toe    TOE AMPUTATION Right 3/23/2019    TOE AMPUTATION RIGHT 5TH TOE [facility] [address] [facility] Right 8/6/2019    TOE AMPUTATION RIGHT 4TH TOE performed by Tika Matthews DPM at [address] ENDOSCOPY  06/2/2011    Mild gastritis with moderatley severe antritis, small hiatal hernia, Dr. Marely Flores N/A 1/12/2019    EGD BIOPSY performed by Clarisa Patel Applicable):  No results found for: LABABO, LABRH    Drug/Infectious Status (If Applicable):  Lab Results   Component Value Date    HEPCAB NON REACTIVE 03/28/2021       COVID-19 Screening (If Applicable):   Lab Results   Component Value Date    COVID19 NOT DETECTED 05/15/2021           Anesthesia Evaluation  Patient summary reviewed  Airway: Mallampati: III       Comment: beard   Dental:      Comment: Very poor dentition and periodontal disease. Multiple loose, chipped, and broken teeth. Pulmonary:normal exam    (+) sleep apnea:                             Cardiovascular:  Exercise tolerance: good (>4 METS),   (+) hypertension:, angina:,             Echocardiogram reviewed               ROS comment: Left ventricular systolic function is normal.   Ejection fraction is visually estimated at 55-60%. Mild left ventricular hypertrophy. Mildly dilated right ventricle. RVSP of 20 mmHG   Sclerotic, but non-stenotic aortic valve. No evidence of any pericardial effusion. Neuro/Psych:   (+) depression/anxiety             GI/Hepatic/Renal:   (+) hiatal hernia, morbid obesity          Endo/Other:    (+) DiabetesType II DM, poorly controlled, , .          Pt had no PAT visit       Abdominal:   (+) obese,         Vascular:                                          Anesthesia Plan      general     ASA 3 - emergent       Induction: intravenous. MIPS: Postoperative opioids intended and Prophylactic antiemetics administered. Anesthetic plan and risks discussed with patient.                       APPLE Wang - CRNA   5/16/2021

## 2021-05-16 NOTE — ANESTHESIA POSTPROCEDURE EVALUATION
Department of Anesthesiology  Postprocedure Note    Patient: El Brower  MRN: [id number]  YOB: 1975  Date of evaluation: 5/16/2021  Time:  11:35 AM     Procedure Summary     Date: 05/16/21 Room / Location: [address]    Anesthesia Start: 8395 Anesthesia Stop: 2269    Procedure: SCROTAL RE-DEBRIDEMENT  INCISION AND DRAINAGE (N/A ) Diagnosis: (SCROTAL ABSCESS)    Surgeons: Manny Chavez MD Responsible Provider: Julianne Loera MD    Anesthesia Type: general ASA Status: 3 - Emergent          Anesthesia Type: general    Mracus Phase I: Marcus Score: 6    Marcus Phase II:      Last vitals: Reviewed and per EMR flowsheets.        Anesthesia Post Evaluation    Patient location during evaluation: ICU  Patient participation: complete - patient participated  Level of consciousness: awake and alert  Airway patency: patent  Nausea & Vomiting: no vomiting  Complications: no  Cardiovascular status: blood pressure returned to baseline and hemodynamically stable  Respiratory status: acceptable, spontaneous ventilation, nonlabored ventilation and room air  Hydration status: stable

## 2021-05-16 NOTE — PROGRESS NOTES
Per Dr. Jayde Barrientos patient can transfer out of icu if ok  general surgery. Per. Dr. Funmi Campbell, pt can be transferred out of icu.

## 2021-05-16 NOTE — PROGRESS NOTES
Nephrology Progress Note  5/16/2021 10:28 AM  Subjective: Interval History: Angela Ford is a 55 y.o. male postop day #1 from Irene's gangrene surgery. Going for redebridement surgery today with OR. Data:   Scheduled Meds:   clindamycin (CLEOCIN) IV  900 mg Intravenous Q8H    piperacillin-tazobactam  4,500 mg Intravenous Q8H    insulin lispro  25 Units Subcutaneous TID WC    aspirin  81 mg Oral Daily    sodium chloride flush  5-40 mL Intravenous 2 times per day    enoxaparin  30 mg Subcutaneous Daily    guaiFENesin  1,200 mg Oral BID    vancomycin (VANCOCIN) intermittent dosing (placeholder)   Other See Admin Instructions    insulin glargine  60 Units Subcutaneous Nightly     Continuous Infusions:   sodium chloride 100 mL/hr at 05/15/21 1757    dextrose      sodium chloride           CBC   Recent Labs     05/14/21  0732 05/15/21  0657 05/16/21  0347   WBC 12.5* 15.1* 14.9*   HGB 9.1* 8.9* 8.7*   HCT 27.7* 29.0* 28.1*    184 252      BMP   Recent Labs     05/14/21  0732 05/15/21  0657 05/16/21  0347   * 129* 129*   K 4.0 4.5 4.3   CL 97* 96* 95*   CO2 19* 16* 18*   BUN 40* 45* 51*   CREATININE 3.2* 3.3* 3.7*     Hepatic: No results for input(s): AST, ALT, ALB, BILITOT, ALKPHOS in the last 72 hours. Troponin: No results for input(s): TROPONINI in the last 72 hours. BNP: No results for input(s): BNP in the last 72 hours. Lipids:   No results for input(s): CHOL, HDL in the last 72 hours. Invalid input(s): LDLCALCU  ABGs:   Lab Results   Component Value Date    PO2ART 119 06/18/2013    ZVB0HJP 47.0 06/18/2013     INR: No results for input(s): INR in the last 72 hours.   Renal Labs  Albumin:    Lab Results   Component Value Date    LABALBU 2.7 04/09/2021    LABALBU 48 03/27/2021     Calcium:    Lab Results   Component Value Date    CALCIUM 7.7 05/16/2021     Phosphorus:    Lab Results   Component Value Date    PHOS 4.2 03/29/2021     U/A:    Lab Results   Component Value anxious        Assessment and Plan:      IMP:  #1 acute renal failure on CKD 3A. With nephrotic range proteinuria. 2.  Acute epididymitis/orchitis. 3.  Type 2 diabetes uncontrolled  4. Hypertension  5. Overweight  6. Hyponatremia  7. Anemia    Plan     #1 renal function slightly worse in the setting of  gangrene and increased risk for infection. Stable urine output with a Luna catheter in place and no acute dialysis needs for now maintain gentle hydration  2. Status post surgery with urology and general surgery yesterday and again going back today. 3.  Sliding-scale insulin try to maintain better control  4. Blood pressure stable  5. Discussed with him 1 more stable but weight loss surgery is not be a better long-term solution for patient and possible going to rehab from here rather than home with family has been about the best support system at home for now  6. Sodium low stable with above  7.   Monitor hemoglobin in the setting of surgical debridement  Renal to follow in discussed with them possible need for dialysis if things were to get worse including potassium increasing but not need acutely now         Josephine Nettles MD, MD

## 2021-05-16 NOTE — PROGRESS NOTES
5/15/2021    Late entry--pt was seen earlier in hte afternoon before surgery, case reviewed with surgery. Urology consult was ordered last night, and today, physical exam show worsening clinical picture. CT shows gas inhte tissues. General surgery has been consulted. Sodium 129Low  MMOL/L    Potassium 4.5 MMOL/L    Chloride 96Low  mMol/L    CO2 16Low  MMOL/L    Anion Gap 17High     BUN 45High  MG/DL    CREATININE 3.3High  MG/DL    Glucose 135High  MG/DL    Calcium 7.7Low  MG/DL    GFR Non-African American 20Low  mL/min/1.73m2        WBC 15. 1High  K/CU MM    RBC 3.14Low  M/CU MM    Hemoglobin 8.9Low  GM/DL    Hematocrit 29. 0Low  %    MCV 92.4 FL    MCH 28.3 PG    MCHC 30.7Low  %    RDW 12.9 %    Platelets 715 K/CU MM    MPV 11.6High  FL     Impression:     1. Stranding and gas within the perineum and base of scrotum.  Findings are   most compatible with necrotizing fasciitis/Irene gangrene.  Surgical   consultation is recommended. 2. Extensive scrotal edema. 3. No abscess. Pt was kept on aggressive antibiotic therapy, and went to surgery later inhte afternoon. Pt is now in the ICU. He will require a second surgery tomorrow, insulin adjusted.    Maya Souza MD

## 2021-05-16 NOTE — PROGRESS NOTES
[address] [facility]  consulted by Dr. Luz Bethea for monitoring and adjustment. Indication for treatment: SSTI  Goal trough: 10-15 mcg/mL  AUC/PAMELA: <500    Pertinent Laboratory Values:   Temp Readings from Last 3 Encounters:   05/16/21 98.6 °F (37 °C) (Oral)   05/15/21 98.6 °F (37 °C)   04/04/21 98.1 °F (36.7 °C) (Oral)     Recent Labs     05/14/21  0732 05/15/21  0657 05/16/21  0347   WBC 12.5* 15.1* 14.9*     Recent Labs     05/14/21  0732 05/15/21  0657 05/16/21  0347   BUN 40* 45* 51*   CREATININE 3.2* 3.3* 3.7*     Estimated Creatinine Clearance: 34 mL/min (A) (based on SCr of 3.7 mg/dL (H)). Intake/Output Summary (Last 24 hours) at 5/16/2021 0740  Last data filed at 5/16/2021 0414  Gross per 24 hour   Intake 800 ml   Output 1260 ml   Net -460 ml       Pertinent Cultures:  Date    Source    Results  5/13   Blood    Pending  5/15                              Urine                                       Pending    Vancomycin level:   TROUGH:  No results for input(s): VANCOTROUGH in the last 72 hours. RANDOM:    Recent Labs     05/14/21  0732 05/15/21  0657 05/16/21  0347   VANCORANDOM 13.6 8.8 18.2       Assessment:  · WBC and temperature: WBC down slightly @ 14.9; patient is afebrile  · SCr, BUN, and urine output:   · baseline Scr is elevated, appears in CARMEN; CKD3  · Scr trend worsening 2.8 -> 3.7  · Day(s) of therapy: # 4  · Vancomycin concentration:   · 5/14: 13.6, therapeutic  · 5/15:  8.8, OK to re-dose  · 5/16:  18.2, supra-therapeutic    Plan:  · CARMEN - CKD3: Intermittent vancomycin dosing based on levels while in CARMEN  · Random level is supra-therapeutic @ 18.2, SCr continues to trend upward.     · No Vancomycin dose today  · Repeat random level tomorrow AM  · Pharmacy will continue to monitor patient and adjust therapy as indicated    [address] 5/17 @ 0600    Thank you for the consult,  MIRELA Desir [facility] [place], [place]   5/16/2021 7:40 AM

## 2021-05-16 NOTE — PROGRESS NOTES
Lab Results   Component Value Date    PROTIME 11.8 03/22/2019    PROTIME 12.5 12/13/2010    INR 1.02 03/22/2019      PTT:    Lab Results   Component Value Date    APTT 30.8 03/22/2019        Blood Culture:  5/13/21 Negative   Urine Culture:  5/15/21 Pending    Surgical Culture: 5/15/21 Pending     Imaging:   CT PELVIS WO CONTRAST Additional Contrast? None    Result Date: 5/15/2021  EXAMINATION: CT OF THE PELVIS WITHOUT CONTRAST 5/15/2021 10:57 am TECHNIQUE: CT of the pelvis was performed without the administration of intravenous contrast.  Multiplanar reformatted images are provided for review. Dose modulation, iterative reconstruction, and/or weight based adjustment of the mA/kV was utilized to reduce the radiation dose to as low as reasonably achievable. COMPARISON: Scrotal ultrasound 05/13/2021, CT pelvis 01/10/2019. HISTORY: ORDERING SYSTEM PROVIDED HISTORY: scrotal cellulitis, rule out perineal and scrotal abscess TECHNOLOGIST PROVIDED HISTORY: Reason for exam:->scrotal cellulitis, rule out perineal and scrotal abscess Additional Contrast?->None Reason for Exam: scrotal cellulitis, rule out perineal and scrotal abscess Acuity: Acute Type of Exam: Initial FINDINGS: There is a large amount of stranding throughout the perineum which extends into the scrotum. There is associated soft tissue gas. 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 No abscess, although there is extensive scrotal edema. The urinary bladder is nondistended. There are mildly prominent bilateral inguinal lymph nodes, measuring up to 1.4 cm, which are most likely reactive. Visualized bowel loops in the pelvis are unremarkable. The appendix is normal. No suspicious osteolytic or osteoblastic lesions are demonstrated. 1. Stranding and gas within the perineum and base of scrotum. 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 Surgical consultation is recommended. 2. Extensive scrotal edema.  3. No abscess. Results of this examination were verbally communicated to the patient's nurse, Carine Claros, at 1:16 p.m. on 05/15/2021 by the [facility] [address] [place] [address]. US SCROTUM AND TESTICLES    Result Date: 5/13/2021  EXAMINATION: ULTRASOUND OF THE SCROTUM/TESTICLES WITH COLOR DOPPLER FLOW EVALUATION; DOPPLER EVALUATION OF THE PELVIS 5/13/2021 COMPARISON: None. HISTORY: ORDERING SYSTEM PROVIDED HISTORY: Right-sided testicular swelling TECHNOLOGIST PROVIDED HISTORY: Reason for exam:->Right-sided testicular swelling Reason for Exam: Rt pain and swelling Acuity: Acute Type of Exam: Initial; ORDERING SYSTEM PROVIDED HISTORY: Rt swelling TECHNOLOGIST PROVIDED HISTORY: Reason for exam:->Rt swelling Reason for Exam: Rt pain and swelling Acuity: Acute Type of Exam: Initial FINDINGS: Measurements: Right testicle: 2.9 x 3.1 x 3.7 cm Left testicle: 2.5 x 3.0 x 3.8 cm Right: Grey scale: The right testicle demonstrates normal homogeneous echotexture without focal lesion. No evidence of testicular microlithiasis. Doppler Evaluation:  There is normal arterial and venous Doppler flow within the testicle. Scrotal Sac:  No evidence of hydrocele. Scrotal wall edema is noted. Epididymis: The epididymis is enlarged and heterogeneous but does not demonstrate increased flow. Left: Grey scale: The left testicle demonstrates normal homogeneous echotexture without focal lesion. No evidence of testicular microlithiasis. Doppler Evaluation:  There is normal arterial and venous Doppler flow within the testicle. Scrotal Sac: There is a trace hydrocele. Scrotal wall edema is noted. Epididymis: The left epididymis could not be identified. 1. Normal flow in both testicles. 2. Appearance of the right epididymis suggests prior epididymitis.      US DUP ABD PEL RETRO SCROT COMPLETE    Result Date: 5/13/2021  EXAMINATION: ULTRASOUND OF THE SCROTUM/TESTICLES WITH COLOR DOPPLER FLOW EVALUATION; DOPPLER EVALUATION OF THE PELVIS 5/13/2021 COMPARISON: None. HISTORY: ORDERING SYSTEM PROVIDED HISTORY: Right-sided testicular swelling TECHNOLOGIST PROVIDED HISTORY: Reason for exam:->Right-sided testicular swelling Reason for Exam: Rt pain and swelling Acuity: Acute Type of Exam: Initial; ORDERING SYSTEM PROVIDED HISTORY: Rt swelling TECHNOLOGIST PROVIDED HISTORY: Reason for exam:->Rt swelling Reason for Exam: Rt pain and swelling Acuity: Acute Type of Exam: Initial FINDINGS: Measurements: Right testicle: 2.9 x 3.1 x 3.7 cm Left testicle: 2.5 x 3.0 x 3.8 cm Right: Grey scale: The right testicle demonstrates normal homogeneous echotexture without focal lesion. No evidence of testicular microlithiasis. Doppler Evaluation:  There is normal arterial and venous Doppler flow within the testicle. Scrotal Sac:  No evidence of hydrocele. Scrotal wall edema is noted. Epididymis: The epididymis is enlarged and heterogeneous but does not demonstrate increased flow. Left: Grey scale: The left testicle demonstrates normal homogeneous echotexture without focal lesion. No evidence of testicular microlithiasis. Doppler Evaluation:  There is normal arterial and venous Doppler flow within the testicle. Scrotal Sac: There is a trace hydrocele. Scrotal wall edema is noted. Epididymis: The left epididymis could not be identified. 1. Normal flow in both testicles. 2. Appearance of the right epididymis suggests prior epididymitis. Assessment & Plan:      Pierre Flaherty is a 55y.o. year old male admitted 5/13/2021 for scrotal cellulitis. 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  Findings are most compatible with necrotizing fasciitis/Irene gangrene   POD #1 Scrotal Exploration with Scrotal and Perineal Debridement with Dr. Destiney Lopez and Dr. Chris Javed    On IV Zosyn, Vanco and Clindamycin   Urine and Surgical cultures pending from 5/15/21   Blood Cultures 5/13/21 Negative x2   WBC 14.9, Afebrile   NPO for re-exploration today, tentatively scheduled at 10AM    Patient to remain in ICU at current    Will continue to follow closely     Patient seen and examined, chart reviewed.      Electronically signed by Joey Lam PA-C on 5/16/2021 at 7:38 AM

## 2021-05-17 LAB
BACTERIA: NEGATIVE /HPF
BILIRUBIN URINE: NEGATIVE MG/DL
BLOOD, URINE: ABNORMAL
CLARITY: ABNORMAL
COLOR: YELLOW
DOSE AMOUNT: NORMAL
DOSE TIME: NORMAL
GLUCOSE BLD-MCNC: 185 MG/DL (ref 70–99)
GLUCOSE BLD-MCNC: 296 MG/DL (ref 70–99)
GLUCOSE BLD-MCNC: 333 MG/DL (ref 70–99)
GLUCOSE BLD-MCNC: 347 MG/DL (ref 70–99)
GLUCOSE, URINE: 150 MG/DL
HCT VFR BLD CALC: 21.4 % (ref 42–52)
HEMOGLOBIN: 7.3 GM/DL (ref 13.5–18)
KETONES, URINE: NEGATIVE MG/DL
LEUKOCYTE ESTERASE, URINE: NEGATIVE
MCH RBC QN AUTO: 29.7 PG (ref 27–31)
MCHC RBC AUTO-ENTMCNC: 34.1 % (ref 32–36)
MCV RBC AUTO: 87 FL (ref 78–100)
MUCUS: ABNORMAL HPF
NITRITE URINE, QUANTITATIVE: NEGATIVE
PDW BLD-RTO: 13.2 % (ref 11.7–14.9)
PH, URINE: 5 (ref 5–8)
PLATELET # BLD: 245 K/CU MM (ref 140–440)
PMV BLD AUTO: 10.2 FL (ref 7.5–11.1)
PROTEIN UA: 100 MG/DL
RBC # BLD: 2.46 M/CU MM (ref 4.6–6.2)
RBC URINE: 4 /HPF (ref 0–3)
SPECIFIC GRAVITY UA: 1.02 (ref 1–1.03)
TRICHOMONAS: ABNORMAL /HPF
UROBILINOGEN, URINE: 2 MG/DL (ref 0.2–1)
VANCOMYCIN RANDOM: 11.7 UG/ML
WBC # BLD: 10.6 K/CU MM (ref 4–10.5)
WBC UA: 9 /HPF (ref 0–2)

## 2021-05-17 PROCEDURE — 2500000003 HC RX 250 WO HCPCS: Performed by: SURGERY

## 2021-05-17 PROCEDURE — 1200000000 HC SEMI PRIVATE

## 2021-05-17 PROCEDURE — 99221 1ST HOSP IP/OBS SF/LOW 40: CPT | Performed by: NURSE PRACTITIONER

## 2021-05-17 PROCEDURE — 82962 GLUCOSE BLOOD TEST: CPT

## 2021-05-17 PROCEDURE — 6370000000 HC RX 637 (ALT 250 FOR IP): Performed by: SURGERY

## 2021-05-17 PROCEDURE — 80048 BASIC METABOLIC PNL TOTAL CA: CPT

## 2021-05-17 PROCEDURE — 6360000002 HC RX W HCPCS: Performed by: SURGERY

## 2021-05-17 PROCEDURE — 99232 SBSQ HOSP IP/OBS MODERATE 35: CPT | Performed by: SURGERY

## 2021-05-17 PROCEDURE — 97606 NEG PRS WND THER DME>50 SQCM: CPT

## 2021-05-17 PROCEDURE — 94761 N-INVAS EAR/PLS OXIMETRY MLT: CPT

## 2021-05-17 PROCEDURE — 85027 COMPLETE CBC AUTOMATED: CPT

## 2021-05-17 PROCEDURE — 36415 COLL VENOUS BLD VENIPUNCTURE: CPT

## 2021-05-17 PROCEDURE — 2580000003 HC RX 258: Performed by: SURGERY

## 2021-05-17 PROCEDURE — 80202 ASSAY OF VANCOMYCIN: CPT

## 2021-05-17 PROCEDURE — 6370000000 HC RX 637 (ALT 250 FOR IP): Performed by: INTERNAL MEDICINE

## 2021-05-17 PROCEDURE — 94150 VITAL CAPACITY TEST: CPT

## 2021-05-17 PROCEDURE — 81001 URINALYSIS AUTO W/SCOPE: CPT

## 2021-05-17 RX ORDER — INSULIN GLARGINE 100 [IU]/ML
65 INJECTION, SOLUTION SUBCUTANEOUS NIGHTLY
Status: DISCONTINUED | OUTPATIENT
Start: 2021-05-17 | End: 2021-05-21 | Stop reason: HOSPADM

## 2021-05-17 RX ORDER — HALOPERIDOL 5 MG/ML
5 INJECTION INTRAMUSCULAR EVERY 6 HOURS PRN
Status: DISCONTINUED | OUTPATIENT
Start: 2021-05-17 | End: 2021-05-20

## 2021-05-17 RX ADMIN — DEXTROSE MONOHYDRATE 900 MG: 50 INJECTION, SOLUTION INTRAVENOUS at 06:31

## 2021-05-17 RX ADMIN — SODIUM CHLORIDE, PRESERVATIVE FREE 10 ML: 5 INJECTION INTRAVENOUS at 14:00

## 2021-05-17 RX ADMIN — SODIUM CHLORIDE, PRESERVATIVE FREE 10 ML: 5 INJECTION INTRAVENOUS at 19:59

## 2021-05-17 RX ADMIN — ONDANSETRON 4 MG: 2 INJECTION INTRAMUSCULAR; INTRAVENOUS at 19:58

## 2021-05-17 RX ADMIN — PIPERACILLIN SODIUM AND TAZOBACTAM SODIUM 4500 MG: 4; .5 INJECTION, POWDER, LYOPHILIZED, FOR SOLUTION INTRAVENOUS at 06:31

## 2021-05-17 RX ADMIN — VANCOMYCIN HYDROCHLORIDE 1500 MG: 5 INJECTION, POWDER, LYOPHILIZED, FOR SOLUTION INTRAVENOUS at 14:00

## 2021-05-17 RX ADMIN — ASPIRIN 81 MG: 81 TABLET, COATED ORAL at 09:11

## 2021-05-17 RX ADMIN — HYDROMORPHONE HYDROCHLORIDE 0.5 MG: 1 INJECTION, SOLUTION INTRAMUSCULAR; INTRAVENOUS; SUBCUTANEOUS at 16:43

## 2021-05-17 RX ADMIN — DEXTROSE MONOHYDRATE 900 MG: 50 INJECTION, SOLUTION INTRAVENOUS at 22:06

## 2021-05-17 RX ADMIN — DEXTROSE MONOHYDRATE 900 MG: 50 INJECTION, SOLUTION INTRAVENOUS at 17:34

## 2021-05-17 RX ADMIN — ZOLPIDEM TARTRATE 5 MG: 5 TABLET ORAL at 22:05

## 2021-05-17 RX ADMIN — INSULIN LISPRO 25 UNITS: 100 INJECTION, SOLUTION INTRAVENOUS; SUBCUTANEOUS at 18:24

## 2021-05-17 RX ADMIN — OXYCODONE HYDROCHLORIDE AND ACETAMINOPHEN 1 TABLET: 5; 325 TABLET ORAL at 18:19

## 2021-05-17 RX ADMIN — OXYCODONE HYDROCHLORIDE AND ACETAMINOPHEN 1 TABLET: 5; 325 TABLET ORAL at 06:59

## 2021-05-17 RX ADMIN — OXYCODONE HYDROCHLORIDE AND ACETAMINOPHEN 1 TABLET: 5; 325 TABLET ORAL at 22:05

## 2021-05-17 RX ADMIN — ENOXAPARIN SODIUM 30 MG: 30 INJECTION SUBCUTANEOUS at 09:11

## 2021-05-17 RX ADMIN — PIPERACILLIN SODIUM AND TAZOBACTAM SODIUM 4500 MG: 4; .5 INJECTION, POWDER, LYOPHILIZED, FOR SOLUTION INTRAVENOUS at 17:34

## 2021-05-17 RX ADMIN — HYDROMORPHONE HYDROCHLORIDE 0.5 MG: 1 INJECTION, SOLUTION INTRAMUSCULAR; INTRAVENOUS; SUBCUTANEOUS at 09:11

## 2021-05-17 RX ADMIN — GUAIFENESIN 1200 MG: 600 TABLET, EXTENDED RELEASE ORAL at 09:11

## 2021-05-17 RX ADMIN — INSULIN LISPRO 25 UNITS: 100 INJECTION, SOLUTION INTRAVENOUS; SUBCUTANEOUS at 13:56

## 2021-05-17 RX ADMIN — HYDROMORPHONE HYDROCHLORIDE 0.5 MG: 1 INJECTION, SOLUTION INTRAMUSCULAR; INTRAVENOUS; SUBCUTANEOUS at 19:57

## 2021-05-17 RX ADMIN — PIPERACILLIN SODIUM AND TAZOBACTAM SODIUM 4500 MG: 4; .5 INJECTION, POWDER, LYOPHILIZED, FOR SOLUTION INTRAVENOUS at 22:12

## 2021-05-17 RX ADMIN — TIZANIDINE 4 MG: 4 TABLET ORAL at 06:59

## 2021-05-17 RX ADMIN — OXYCODONE HYDROCHLORIDE AND ACETAMINOPHEN 1 TABLET: 5; 325 TABLET ORAL at 01:05

## 2021-05-17 RX ADMIN — GUAIFENESIN 1200 MG: 600 TABLET, EXTENDED RELEASE ORAL at 19:59

## 2021-05-17 RX ADMIN — DEXTROSE MONOHYDRATE 900 MG: 50 INJECTION, SOLUTION INTRAVENOUS at 00:30

## 2021-05-17 RX ADMIN — INSULIN GLARGINE 65 UNITS: 100 INJECTION, SOLUTION SUBCUTANEOUS at 22:14

## 2021-05-17 ASSESSMENT — PAIN DESCRIPTION - DESCRIPTORS
DESCRIPTORS: ACHING;BURNING;CONSTANT
DESCRIPTORS: BURNING;CONSTANT

## 2021-05-17 ASSESSMENT — PAIN DESCRIPTION - PROGRESSION
CLINICAL_PROGRESSION: GRADUALLY WORSENING
CLINICAL_PROGRESSION: GRADUALLY WORSENING

## 2021-05-17 ASSESSMENT — PAIN DESCRIPTION - PAIN TYPE: TYPE: SURGICAL PAIN

## 2021-05-17 ASSESSMENT — PAIN DESCRIPTION - ORIENTATION: ORIENTATION: RIGHT;LEFT

## 2021-05-17 ASSESSMENT — PAIN SCALES - GENERAL
PAINLEVEL_OUTOF10: 8
PAINLEVEL_OUTOF10: 9

## 2021-05-17 ASSESSMENT — PAIN DESCRIPTION - ONSET
ONSET: PROGRESSIVE
ONSET: PROGRESSIVE

## 2021-05-17 ASSESSMENT — PAIN - FUNCTIONAL ASSESSMENT: PAIN_FUNCTIONAL_ASSESSMENT: ACTIVITIES ARE NOT PREVENTED

## 2021-05-17 NOTE — PROGRESS NOTES
[facility] [address]   Progress Note  [facility] 0 1 2      Date: 2021   Patient: Chilango Tan   : 1975   DOA: 2021   MRN: [id number]   ROOM#: [id number]     Admit Date: 2021     Collaborating Urologist on Call at time of admission: Dr. Surekha Campos    CC: Scrotal Swelling   s/p Scrotal Exploration with Scrotal and Perineal Debridement with Dr. Surekha Campos and Dr. Tacho Marmolejo on 5/15/21  s/p repeat scrotal/perineal debridement on 21    Subjective:     Pain: mild, no nausea and no vomiting,   Bowel Movement/Flatus:   Yes  Voiding: indwelling singleton catheter, urine clear    \"I feel much better today\"   Wound care at bedside attempting wound vac placement.    He is wanting his singleton catheter removed, okay to remove and place condom catheter for now    Objective:    Vitals:    /69   Pulse 85   Temp 98.4 °F (36.9 °C) (Oral)   Resp 18   Ht 5' 9\" (1.753 m)   Wt 294 lb 14.4 oz (133.8 kg)   SpO2 95%   BMI 43.55 kg/m²    Temp  Av.5 °F (36.9 °C)  Min: 97.7 °F (36.5 °C)  Max: 98.9 °F (37.2 °C)       Intake/Output Summary (Last 24 hours) at 2021 0454  Last data filed at 2021 0547  Gross per 24 hour   Intake 805 ml   Output 2735 ml   Net -1930 ml       Physical Exam:   General appearance: alert, appears stated age, cooperative, no distress and morbidly obese  Head: Normocephalic, without obvious abnormality, atraumatic  Abdomen: protuberant abdomen, soft, non-tender, non-distended  Male genitalia: mildly buried penis, indwelling singleton catheter in place, urine clear/yellow, s/p scrotal exploration/debridement, wound edges appear healthy, testes viable     Labs:   WBC:    Lab Results   Component Value Date    WBC 14.9 2021      Hemoglobin/Hematocrit:    Lab Results   Component Value Date    HGB 8.7 2021    HCT 28.1 2021      BMP:   Lab Results   Component Value Date     2021    K 4.3 2021    CL 95 2021    CO2 18 2021 perineum and base of scrotum. 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 Surgical consultation is recommended. 2. Extensive scrotal edema. 3. No abscess. Results of this examination were verbally communicated to the patient's nurse, Mc Hussein, at 1:16 p.m. on 05/15/2021 by the [address] US SCROTUM AND TESTICLES    Result Date: 5/13/2021  EXAMINATION: ULTRASOUND OF THE SCROTUM/TESTICLES WITH COLOR DOPPLER FLOW EVALUATION; DOPPLER EVALUATION OF THE PELVIS 5/13/2021 COMPARISON: None. HISTORY: ORDERING SYSTEM PROVIDED HISTORY: Right-sided testicular swelling TECHNOLOGIST PROVIDED HISTORY: Reason for exam:->Right-sided testicular swelling Reason for Exam: Rt pain and swelling Acuity: Acute Type of Exam: Initial; ORDERING SYSTEM PROVIDED HISTORY: Rt swelling TECHNOLOGIST PROVIDED HISTORY: Reason for exam:->Rt swelling Reason for Exam: Rt pain and swelling Acuity: Acute Type of Exam: Initial FINDINGS: Measurements: Right testicle: 2.9 x 3.1 x 3.7 cm Left testicle: 2.5 x 3.0 x 3.8 cm Right: Grey scale: The right testicle demonstrates normal homogeneous echotexture without focal lesion. No evidence of testicular microlithiasis. Doppler Evaluation:  There is normal arterial and venous Doppler flow within the testicle. Scrotal Sac:  No evidence of hydrocele. Scrotal wall edema is noted. Epididymis: The epididymis is enlarged and heterogeneous but does not demonstrate increased flow. Left: Grey scale: The left testicle demonstrates normal homogeneous echotexture without focal lesion. No evidence of testicular microlithiasis. Doppler Evaluation:  There is normal arterial and venous Doppler flow within the testicle. Scrotal Sac: There is a trace hydrocele. Scrotal wall edema is noted. Epididymis: The left epididymis could not be identified. 1. Normal flow in both testicles.  2. Appearance of the right epididymis suggests prior epididymitis. US DUP ABD PEL RETRO SCROT COMPLETE    Result Date: 5/13/2021  EXAMINATION: ULTRASOUND OF THE SCROTUM/TESTICLES WITH COLOR DOPPLER FLOW EVALUATION; DOPPLER EVALUATION OF THE PELVIS 5/13/2021 COMPARISON: None. HISTORY: ORDERING SYSTEM PROVIDED HISTORY: Right-sided testicular swelling TECHNOLOGIST PROVIDED HISTORY: Reason for exam:->Right-sided testicular swelling Reason for Exam: Rt pain and swelling Acuity: Acute Type of Exam: Initial; ORDERING SYSTEM PROVIDED HISTORY: Rt swelling TECHNOLOGIST PROVIDED HISTORY: Reason for exam:->Rt swelling Reason for Exam: Rt pain and swelling Acuity: Acute Type of Exam: Initial FINDINGS: Measurements: Right testicle: 2.9 x 3.1 x 3.7 cm Left testicle: 2.5 x 3.0 x 3.8 cm Right: Grey scale: The right testicle demonstrates normal homogeneous echotexture without focal lesion. No evidence of testicular microlithiasis. Doppler Evaluation:  There is normal arterial and venous Doppler flow within the testicle. Scrotal Sac:  No evidence of hydrocele. Scrotal wall edema is noted. Epididymis: The epididymis is enlarged and heterogeneous but does not demonstrate increased flow. Left: Grey scale: The left testicle demonstrates normal homogeneous echotexture without focal lesion. No evidence of testicular microlithiasis. Doppler Evaluation:  There is normal arterial and venous Doppler flow within the testicle. Scrotal Sac: There is a trace hydrocele. Scrotal wall edema is noted. Epididymis: The left epididymis could not be identified. 1. Normal flow in both testicles. 2. Appearance of the right epididymis suggests prior epididymitis. Assessment & Plan:      Pierre Flaherty is a 55y.o. year old male admitted 5/13/2021 for scrotal cellulitis. 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  Findings are most compatible with necrotizing fasciitis/Irene gangrene   POD #2 Scrotal Exploration with Scrotal and Perineal Debridement with Dr. Dewey Carey and Dr. Lord Loop   POD #1 repeat scrotal/perineal debridement   On IV Zosyn, Vanco and Clindamycin   Urine culture Negative; Blood Cultures 5/13/21 Negative x2   Surgical Culture pending from 5/15/21, adjust antibiotics as appropriate   WBC 10.6, improved from 15.1 on 5/16/21, Afebrile   Wound care attempting wound vac placement this morning, if uncussessful will place wet to dry dressing   Okay to remove indwelling singleton catheter, place condom catheter to keep scrotal/perineal wound clean    Will continue to follow closely     Patient seen and examined, chart reviewed.      Electronically signed by Sunshine Marquez PA-C on 5/17/2021 at 7:05 AM

## 2021-05-17 NOTE — CONSULTS
[address] [id number] Continence Nurse  Consult Note       Katelynn Mendez  AGE: 55 y.o. GENDER: male  : 1975  TODAY'S DATE:  2021    Subjective:     Reason for CWOCN Evaluation and Assessment: wound assessment/NPWT application      Katelynn Mendez is a 55 y.o. male referred by:   [x] Physician  [] Nursing  [] Other:     Wound Identification:  Wound Type: surgical S/P debridement Fouriner's gangrene  Contributing Factors: edema, diabetes, decreased mobility and obesity        PAST MEDICAL HISTORY        Diagnosis Date    Abscess     scrotal    Acute renal failure (ARF) ([address] 2019    Anxiety associated with depression     Anxiety associated with depression     Back pain 2012    Chest pain 2013    Diabetic nephropathy ([address]     Diabetic neuropathy ([address] 2011    Diabetic ulcer of left midfoot associated with type 2 diabetes mellitus, with fat layer exposed ([address] 2017    Diverticulosis     C scope + Dr. Osorio Johnson DM (diabetes mellitus), type 2 ([address]     DR. Turner podiatry    Hyperlipidemia LDL goal < 100 7/15/2013    Hypertension     Internal hemorrhoid     C scope + Dr. Gonzalez Pintos fracture 1988    Panic attacks     Pericarditis     Hospitalized with s/p heart cath normal    Peripheral autonomic neuropathy due to diabetes mellitus ([address]     Axonal EMG- N[facility], 2011    Sebaceous cyst 2011    URI (upper respiratory infection) 2012    WD-Wound, surgical, infected, initial encounter 2017    Wrist fracture        PAST SURGICAL HISTORY    Past Surgical History:   Procedure Laterality Date   [address] 2013    Normal (Dr. Keating Poster)    COLONOSCOPY  2011    Pandiverticulosis, Nonbleeding internal hemorrhoids, Repeat colonoscopy at age 48- Dr. Denver Peng    \"had reconstruction surgery on nose a year after the other nose surgery\"    OTHER SURGICAL HISTORY Right 2015    I & Frequency: 7.0 times per week     Types: Marijuana       ALLERGIES    Allergies   Allergen Reactions    Adhesive Tape Rash    Doxycycline Nausea And Vomiting    Reglan [Metoclopramide] Anxiety       MEDICATIONS    No current facility-administered medications on file prior to encounter. Current Outpatient Medications on File Prior to Encounter   Medication Sig Dispense Refill    aspirin 81 MG EC tablet Take 1 tablet by mouth daily 30 tablet 3    insulin glargine (LANTUS) 100 UNIT/ML injection vial Inject 40 Units into the skin nightly 1 vial 5    insulin lispro (HUMALOG) 100 UNIT/ML injection vial Inject 25 Units into the skin 3 times daily (with meals) 1 vial 5    linagliptin (TRADJENTA) 5 MG tablet Take 1 tablet by mouth daily 30 tablet 5    ondansetron (ZOFRAN) 4 MG tablet Take 1 tablet by mouth every 8 hours as needed for Nausea or Vomiting 20 tablet 0    Lancets MISC 1 each by Does not apply route daily 100 each 3    glucose monitoring kit (FREESTYLE) monitoring kit 1 each by Does not apply route once for 1 dose.  1 kit 0         Objective:      /69   Pulse 85   Temp 98.4 °F (36.9 °C) (Oral)   Resp 18   Ht 5' 9\" (1.753 m)   Wt 294 lb 14.4 oz (133.8 kg)   SpO2 95%   BMI 43.55 kg/m²   Pradeep Risk Score: Pradeep Scale Score: 15    LABS    CBC:   Lab Results   Component Value Date    WBC 10.6 05/17/2021    RBC 2.46 05/17/2021    HGB 7.3 05/17/2021    HCT 21.4 05/17/2021    MCV 87.0 05/17/2021    MCH 29.7 05/17/2021    MCHC 34.1 05/17/2021    RDW 13.2 05/17/2021     05/17/2021    MPV 10.2 05/17/2021     CMP:    Lab Results   Component Value Date     05/16/2021    K 4.3 05/16/2021    CL 95 05/16/2021    CO2 18 05/16/2021    BUN 51 05/16/2021    CREATININE 3.7 05/16/2021    GFRAA 22 05/16/2021    LABGLOM 18 05/16/2021    GLUCOSE 187 05/16/2021    PROT 5.8 04/09/2021    PROT 7.1 04/11/2012    LABALBU 2.7 04/09/2021    LABALBU 48 03/27/2021    CALCIUM 7.7 05/16/2021    BILITOT 0.2 04/09/2021    ALKPHOS 58 04/09/2021    AST 10 04/09/2021    ALT 11 04/09/2021     Albumin:    Lab Results   Component Value Date    LABALBU 2.7 04/09/2021    LABALBU 48 03/27/2021     PT/INR:    Lab Results   Component Value Date    PROTIME 11.8 03/22/2019    PROTIME 12.5 12/13/2010    INR 1.02 03/22/2019     HgBA1c:    Lab Results   Component Value Date    LABA1C 9.7 05/13/2021         Assessment:     Patient Active Problem List   Diagnosis    Hiatal hernia    Diabetes mellitus type 2, improved controlled    Diabetic neuropathy ([address]    Panic attack    Anxiety associated with depression    Neck pain    DANIELA (obstructive sleep apnea)    Urinary frequency    Bilateral carpal tunnel syndrome- left worse than right    Hyperlipidemia with target LDL less than 100    HTN, goal below 140/90    Bronchitis    Osteomyelitis ([address]    Diabetic ulcer of left midfoot ([address]    WD-Diabetic ulcer of left midfoot associated with type 2 diabetes mellitus, with fat layer exposed ([address]    Abscess    Periumbilical hernia    WD-Wound, surgical, infected, initial encounter    Sepsis ([address]    Cellulitis of left foot    Constipation    Intractable nausea and vomiting    Uncontrolled type 2 diabetes mellitus (HCC)    Nausea and vomiting    Diabetic foot infection ([address]    Acute renal failure (ARF) (HCC)    Osteomyelitis of fourth toe of right foot (HCC)    Cellulitis    Cellulitis of left arm    Cellulitis, scrotum    Acute epididymitis    Irene gangrene       Measurements:  Negative Pressure Wound Therapy Groin (Active)   Wound Type Surgical 05/17/21 0930   Unit Type KCI 05/17/21 0930   Dressing Type Black foam;White foam 05/17/21 0930   Number of pieces used 4 05/17/21 0930   Cycle Continuous 05/17/21 0930   Target Pressure (mmHg) 125 05/17/21 0930   Canister changed?  Yes 05/17/21 0930   Dressing Status Changed 05/17/21 0930   Dressing Changed Changed/New 05/17/21 0930   Drainage Amount Moderate 05/17/21 0930   Drainage Description Serosanguinous 05/17/21 0930   Wound Assessment Red;Pink;Yellow 05/17/21 0930   Shazia-wound Assessment Intact 05/17/21 0930   Shape irregular 05/17/21 0930   Odor None 05/17/21 0930   Number of days: 0       Wound 07/18/17 Other (Comment) WOUND #2 LEFT PLANTAR (ONSET 3 MTHS) (Active)   Number of days: 1098       Wound 12/03/17 Other (Comment) Foot Left (Active)   Number of days: 1260       Wound 12/08/17 #3 abdoment (onset 3 days ago) SURGICAL (Active)   Number of days: 8042       Wound 12/08/17 #4 Lt plantar (onset 6 months ago) DIABETIC ALCALA 1 (Active)   Number of days: 3479       Wound 05/18/18 # 5 LEFT PLANTAR (ONSET 1 YEAR AGO) DM WAG 1 (Active)   Number of days: 5049       Wound 09/17/18 Left dorsal foot and 4th toe amp site 9/19/18 (Active)   Number of days: 972       Incision 05/22/15 Foot Right (Active)   Number of days: 2186       Incision 12/04/17 Abdomen Mid (Active)   Number of days: 8796       Incision 09/22/18 Foot Left (Active)   Number of days: 367       Wound 01/11/19 left plantar foot wound (Active)   Number of days: 614       Wound 03/21/19 Toe (Comment  which one) Anterior;Right (Active)   Number of days: 788       Wound 03/21/19 Foot Left;Posterior hard callus area (Active)   Number of days: 612       Wound 03/24/21 Left;Plantar (Active)   Number of days: 53       Wound 05/13/21 Left;Plantar (Active)   Wound Etiology Diabetic 05/16/21 2003   Dressing Status Clean;Dry; Intact 05/16/21 2003   Wound Cleansed Cleansed with saline 05/16/21 2003   Dressing/Treatment Open to air 05/16/21 2003   Wound Length (cm) 0.3 cm 05/13/21 1611   Wound Width (cm) 0.5 cm 05/13/21 1611   Wound Depth (cm) 0.5 cm 05/13/21 1611   Wound Surface Area (cm^2) 0.15 cm^2 05/13/21 1611   Wound Volume (cm^3) 0.08 cm^3 05/13/21 1611   Distance Tunneling (cm) 0 cm 05/13/21 1611   Tunneling Position ___ O'Clock 0 05/13/21 1611   Undermining Starts ___ O'Clock 0 05/13/21 1611   Undermining Ends___ Balta Yanes on 5/15/21 and 5/16/21 for Irene gangrene. Premedicated with pain medication. C/o pain more from singleton being in. Consulted for possible NPWT application. Packing removed gently with irrigating with NS. Wound extends from anterior right groin to scrotum with testicle exposed to lower buttock close to anus. Subcutaneous tissue noted. Appropriate for wound vac but proximity to anus may prove difficult to maintain seal. Pt would like to try NPWT if possible. Shazia wound with hair shaved some. 1 piece white foam applied to exposed testicle followed by 3 pieces black foam. Drape applied. Shazia wound moist and difficult to get drape to adhere. Adequate seal obtained to 125 mmHg continuous suction. Remain guarded regarding maintaining seal. Updated nurse to apply NS wet to dry dressing if unable to maintain seal. Pt tolerated well. Pt is at mild risk for skin breakdown AEB Pradeep. Follow Pradeep orders. Specialty Bed Required : yes  [] Low Air Loss   [x] Pressure Redistribution  [] Fluid Immersion  [] Bariatric  [] Total Pressure Relief  [] Other:     Discharge Plan:  Placement for patient upon discharge: tbd  Hospice Care: no  Patient appropriate for Outpatient [address]: follow up with Dr. Lola Morrow    Patient/Caregiver Teaching:  Level of patient/caregiver understanding able to:   Voiced understanding.         Electronically signed by Brandy Bennett RN, Tawnya Rosario on 5/17/2021 at 12:02 PM

## 2021-05-17 NOTE — PROGRESS NOTES
General Surgery-Dr. 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 Had second look in OR yesterday. Tolerating PO. Feeling better but soreness in groin. Denies F/C. Wants regular food. ROS:  Review of Systems   Constitutional: Negative for chills and fever. Skin: Positive for wound. All other systems reviewed and are negative. Allergies  Adhesive tape, Doxycycline, and Reglan [metoclopramide]          Diagnosis Date    Abscess     scrotal    Acute renal failure (ARF) ([address] 2019    Anxiety associated with depression     Anxiety associated with depression     Back pain 2012    Chest pain 2013    Diabetic nephropathy ([address]     Diabetic neuropathy ([address] 2011    Diabetic ulcer of left midfoot associated with type 2 diabetes mellitus, with fat layer exposed ([address] 2017    Diverticulosis     C scope + Dr. Zurita Dural DM (diabetes mellitus), type 2 ([address]     DR. Turner podiatry    Hyperlipidemia LDL goal < 100 7/15/2013    Hypertension     Internal hemorrhoid     C scope + Dr. William Daft fracture 1988    Panic attacks     Pericarditis     Hospitalized with s/p heart cath normal    Peripheral autonomic neuropathy due to diabetes mellitus ([address]     Axonal EMG- [facility], 2011    Sebaceous cyst 2011    URI (upper respiratory infection) 2012    WD-Wound, surgical, infected, initial encounter 2017    Wrist fracture        Objective:     Vitals:    21 0400   BP: 116/69   Pulse: 85   Resp: 18   Temp: 98.4 °F (36.9 °C)   SpO2: 95%       TEMPERATURE:  Current -Temp: 98.4 °F (36.9 °C); Max - Temp  Av.4 °F (36.9 °C)  Min: 97.7 °F (36.5 °C)  Max: 98.9 °F (37.2 °C)    No intake/output data recorded. I/O last 3 completed shifts: In: 0 [I.V.:500; IV Piggyback:305]  Out: 7533 [Urine:2725; Blood:10]      Physical Exam:  Physical Exam  Vitals reviewed. Constitutional:       General: He is not in acute distress. Appearance: He is obese. He is not ill-appearing, toxic-appearing or diaphoretic. HENT:      Head: Normocephalic and atraumatic. Right Ear: External ear normal.      Left Ear: External ear normal.      Nose: Nose normal.   Eyes:      General:         Right eye: No discharge. Left eye: No discharge. Extraocular Movements: Extraocular movements intact. Cardiovascular:      Rate and Rhythm: Normal rate. Pulmonary:      Effort: No respiratory distress. Abdominal:      Palpations: Abdomen is soft. Tenderness: There is no abdominal tenderness. Genitourinary:     Comments: +singleton    +dressings in placed, wound edges viable, no obvious foul odor today    Appropriately tender  Musculoskeletal:      Cervical back: Normal range of motion. Skin:     General: Skin is warm. Neurological:      General: No focal deficit present. Mental Status: He is alert.    Psychiatric:         Mood and Affect: Mood normal.           Scheduled Meds:   clindamycin (CLEOCIN) IV  900 mg Intravenous Q8H    piperacillin-tazobactam  4,500 mg Intravenous Q8H    insulin lispro  25 Units Subcutaneous TID WC    aspirin  81 mg Oral Daily    sodium chloride flush  5-40 mL Intravenous 2 times per day    enoxaparin  30 mg Subcutaneous Daily    guaiFENesin  1,200 mg Oral BID    vancomycin (VANCOCIN) intermittent dosing (placeholder)   Other See Admin Instructions    insulin glargine  60 Units Subcutaneous Nightly     ContinuousInfusions:   dextrose      sodium chloride       PRN Meds:HYDROmorphone **OR** HYDROmorphone, ondansetron, glucose, dextrose, glucagon (rDNA), dextrose, sodium chloride flush, sodium chloride, potassium chloride **OR** potassium alternative oral replacement **OR** potassium chloride, magnesium sulfate, promethazine **OR** ondansetron, polyethylene glycol, fleet, nicotine, acetaminophen **OR** acetaminophen, zolpidem, guaiFENesin-dextromethorphan, benzonatate, calcium carbonate, oxyCODONE-acetaminophen      Labs/Imaging Results:   Lab Results   Component Value Date    WBC 10.6 (H) 05/17/2021    HGB 7.3 (L) 05/17/2021    HCT 21.4 (L) 05/17/2021    MCV 87.0 05/17/2021     05/17/2021     Lab Results   Component Value Date     (L) 05/16/2021    K 4.3 05/16/2021    CL 95 (L) 05/16/2021    CO2 18 (L) 05/16/2021    BUN 51 (H) 05/16/2021    CREATININE 3.7 (H) 05/16/2021    GLUCOSE 187 (H) 05/16/2021    CALCIUM 7.7 (L) 05/16/2021    PROT 5.8 (L) 04/09/2021    LABALBU 2.7 (L) 04/09/2021    BILITOT 0.2 04/09/2021    ALKPHOS 58 04/09/2021    AST 10 (L) 04/09/2021    ALT 11 04/09/2021    LABGLOM 18 (L) 05/16/2021    GFRAA 22 (L) 05/16/2021       Assessment:     56 y/o M POD 1 and 2 s/p:    Initial debridement for tolu gangrene and planned second look    Plan:       -ABx per primary  -WBC count 10.6 today, almost normal, down from 14 and 15.   -Dressing changes. D/w wound nurse. If able to place vac, ok. However, concerned given area that may not hold suction.   -Luna care per .  -Diet as tolerated.  -No plans for repeat debridement at this time as wound is viable.        Electronically signed by [address], [place], MD on 5/17/2021 at 9:18 AM

## 2021-05-17 NOTE — CONSULTS
expressing suicidal thoughts and states \" I'm just ready for this to end. \" This nurse asked if he has a plan, patient says he says he has tried to make a plan so his son won't find him, he says he doesn't have the guts to. Pt states he doesn't want to hurt himself right now, \"just want the pain to stop\". \"If I was going to do it I would probably go find fair amount of narcotics and a big bottle of whisky, I think that is the most peaceful. \" \"But this method is not as thorough enough for me. \". This nurse offered support, pt very tearful and thankful. Pt says he wants to make lifestyle changes he needs to but just wants to feel supported and help with his feelings\". Past Psychiatric History:   Denies hx of psychiatric hospitalization  Denies hx of SI in the past.  AS a youth was court ordered for marijuana substance abuse rehab  Reports hx of depression but cannot remember medication started. Family Psychiatric History:   Family History   Problem Relation Age of Onset   Del Jami Cancer Mother         ?site   Gabriel Farrell Stroke Mother     Bleeding Prob Mother     Diabetes Father     Heart Disease Father     High Blood Pressure Father     Obesity Father     Kidney Disease Father     Diabetes Sister     High Blood Pressure Sister     Mental Illness Sister     Obesity Sister     High Blood Pressure Other     Mental Illness Other         bipolar    Other Son         cyst in ear canal    ADHD Daughter         Allergies:   Allergies   Allergen Reactions    Adhesive Tape Rash    Doxycycline Nausea And Vomiting    Reglan [Metoclopramide] Anxiety        OBJECTIVE  Vital Signs:  Vitals:    05/17/21 0400   BP: 116/69   Pulse: 85   Resp: 18   Temp: 98.4 °F (36.9 °C)   SpO2: 95%       Labs:  Recent Results (from the past 48 hour(s))   COVID-19, Rapid    Collection Time: 05/15/21  1:35 PM    Specimen: Nasopharyngeal   Result Value Ref Range    Source THROAT     SARS-CoV-2, NAAT NOT DETECTED NOT DETECTED   POCT Glucose Time: 05/16/21 10:29 PM   Result Value Ref Range    POC Glucose 280 (H) 70 - 99 MG/DL   CBC    Collection Time: 05/17/21  7:22 AM   Result Value Ref Range    WBC 10.6 (H) 4.0 - 10.5 K/CU MM    RBC 2.46 (L) 4.6 - 6.2 M/CU MM    Hemoglobin 7.3 (L) 13.5 - 18.0 GM/DL    Hematocrit 21.4 (L) 42 - 52 %    MCV 87.0 78 - 100 FL    MCH 29.7 27 - 31 PG    MCHC 34.1 32.0 - 36.0 %    RDW 13.2 11.7 - 14.9 %    Platelets 598 937 - 889 K/CU MM    MPV 10.2 7.5 - 11.1 FL   Vancomycin, random    Collection Time: 05/17/21  7:22 AM   Result Value Ref Range    Vancomycin Rm 11.7 UG/ML    DOSE AMOUNT DOSE AMT. GIVEN - none, random level     DOSE TIME DOSE TIME GIVEN - none, random level    POCT Glucose    Collection Time: 05/17/21  9:13 AM   Result Value Ref Range    POC Glucose 347 (H) 70 - 99 MG/DL   POCT Glucose    Collection Time: 05/17/21 11:40 AM   Result Value Ref Range    POC Glucose 333 (H) 70 - 99 MG/DL       Review of Systems:  Reports of no current cardiovascular, respiratory, gastrointestinal, genitourinary, integumentary, neurological, muscuoskeletal, or immunological symptoms today. PSYCHIATRIC: See HPI above.     PSYCHIATRIC EXAMINATION / MENTAL STATUS EXAM    CONSTITUTIONAL:    Vitals:   Vitals:    05/17/21 0400   BP: 116/69   Pulse: 85   Resp: 18   Temp: 98.4 °F (36.9 °C)   SpO2: 95%      General appearance: [x] appears age, []  appears older than stated age,               [x]  adequately dressed and groomed, [] disheveled,               [x]  in no acute distress, [] appears mildly distressed, [] other           MUSCULOSKELETAL:   Gait:   [] normal, [] antalgic, [] unsteady, [x] gait not evaluated   Station:             [] erect, [] sitting, [x] recumbent, [] other        Strength/tone:  [x] muscle strength and tone appear consistent with age and                                        condition     [] atrophy      [] abnormal movements  PSYCHIATRIC:    Relatedness:  [x] cooperative, [] guarded, [] indifferent, [] hostile,      [] sedated  Speech:  [x] normal prosody, [] pressured, [] decreased volume,    [] increased volume [] slurred [] slowed, [] delayed     [] echolalia, [] incoherent, [] stuttering   Eye contact:  [x] direct, [] fleeting , [] intense []  none  Kinetics:  [x] normal, [] increased, [] decreased  Mood:   [] stable, [x] depressed, [] anxious, [] irritable,     [] labile  [] euphoric   Affect:   [] normal range, [] constricted, [x] depressed , [] anxious,  [] angry, [x]  blunted     [] mood incongruent, [] blunted  [] restricted   Hallucinations:  [x] denies, [] auditory,  [] visual,  [] olfactory, [] tactile  Delusions:  [x] none, [] grandiose,  [] paranoid,  [] persecutory,  [] somatic,     [] bizarre  [] Mandaen/spiritual    Preoccupations:   [x] none, [] violence, [] obsessions, [] other     Suicidal ideation  [] denies, [x] endorses  Homicidal ideation [x] denies, [] endorses  Thought process: [x] logical , [] circumstantial, [] tangential, [] SE,     [] simplistic, [] disorganized  [] FOI  [] concrete  [] nonsensical    Thought Content: [] future oriented [] goal directed  [x] self-harm, [] guilt,     [x] hopelessness  [] obsessive  [] superficial  [] preoccupation    Insight:   [] adequate , [x] limited , [] impaired    Judgment:  [] adequate , [x] limited  [] impaired  Associations:              []  Logical and coherent , [] loosening, [] disorganized   Attention and concentration:     [] intact [x] limited [] impaired , [] grossly impaired  Orientation:  [x] person, place, time, situation     [] disoriented to:     Memory:             [x] superficially intact, [] impaired         Impression:   Major depression, severe vs Mood disorder  SI with plan    Problem List:   <principal problem not specified>    Plan:  1. Recommend inpatient psychiatric hospitalization as patient continues to endorse suicidal ideation with plan. 2. Will defer starting psychiatric medications to accepting facility  3.  SP

## 2021-05-17 NOTE — PROGRESS NOTES
5/17/2021    Pt was seen in the morning while wound vac was being put on. VSS. Afebrile, tolerating diet. He still endorses suicidal ideation, and is fearful about his medical condition. He feels better overall since his surgery. Lungs clear, abd soft with active bowel sounds. Heart regular,  No leg edema. His perineum is less red than before surgery. WBC 10. 6High  K/CU MM    RBC 2.46Low  M/CU MM    Hemoglobin 7.3Low  GM/DL    Hematocrit 21.4Low  %    MCV 87.0 FL    MCH 29.7 PG    MCHC 34.1 %    RDW 13.2 %    Platelets 986 K/CU MM    MPV 10.2 FL     Specimen PERINEUM     Special Requests perineal culture and sensitivity    Culture Prelim Report     DIPHTHEROIDS Light growth No further workup     STAPHYLOCOCCUS EPIDERMIDIS Rare growth No further workupAbnormal      CLOSTRIDIUM INNOCUUM Moderate growth No further workup Sensitivities not routinely done. Drugs of choice are: Penicillin G, Metronidazole, Clindamycin or Piperacillin/Tazobactam.Abnormal      hsi glucose remains elevated I will adjust insulin for better control. Check labs for renal function and blood count in am, may need transfusion .   Fatou Berman MD

## 2021-05-17 NOTE — PROGRESS NOTES
[address] [facility]  consulted by Dr. Roney Washington for monitoring and adjustment. Indication for treatment: SSTI  Goal trough: 10-15 mcg/mL  AUC/PAMELA: <500    Pertinent Laboratory Values:   Temp Readings from Last 3 Encounters:   05/17/21 98.4 °F (36.9 °C) (Oral)   05/16/21 98.6 °F (37 °C)   05/15/21 98.6 °F (37 °C)     Recent Labs     05/15/21  0657 05/16/21  0347 05/17/21  0722   WBC 15.1* 14.9* 10.6*     Recent Labs     05/15/21  0657 05/16/21  0347   BUN 45* 51*   CREATININE 3.3* 3.7*     Estimated Creatinine Clearance: 34 mL/min (A) (based on SCr of 3.7 mg/dL (H)). Intake/Output Summary (Last 24 hours) at 5/17/2021 1048  Last data filed at 5/17/2021 0547  Gross per 24 hour   Intake 500 ml   Output 2735 ml   Net -2235 ml       Pertinent Cultures:  Date    Source    Results  5/13   Blood    NGTD  5/15   Urine    NGTD  5/15   Surgical   Pending    Vancomycin level:   TROUGH:  No results for input(s): VANCOTROUGH in the last 72 hours.   RANDOM:    Recent Labs     05/15/21  0657 05/16/21  0347 05/17/21  0722   VANCORANDOM 8.8 18.2 11.7       Assessment:  · WBC and temperature: WBC improved; afebrile  · SCr, BUN, and urine output:   · CARMEN on CKD3  · Scr trend worsening 2.8 -> 3.7  · Day(s) of therapy: #5  · Vancomycin concentration:   · 5/14: 13.6, therapeutic  · 5/15:  8.8, OK to re-dose  · 5/16:  18.2, supra-therapeutic  · 5/17: 11.7, OK to re-dose    Plan:  · Continue intermittent vancomycin dosing  · Based on level result, re-dose vancomycin 1500 mg x1  · Repeat random level in 48h  · Pharmacy will continue to monitor patient and adjust therapy as indicated    VANCOMYCIN CONCENTRATION SCHEDULED FOR 5/19 @ 0600    Thank you for the consult,  Octavio Santana, [facility], [address]   5/17/2021 10:48 AM

## 2021-05-17 NOTE — CARE COORDINATION
LSW informed that wound care attempted to place a wound vac on the Pt. If it is successful Pt will need home care for wound vac care. LSW will also need measurements for auth for wound vac. Pt has already picked a home care agency with Katarzyna Leggett. LSW will continue to follow for possible need for wound vac. Pt is also being watched for possible discharge with IV ATB.

## 2021-05-17 NOTE — PROGRESS NOTES
5/16/2021    Pt was seen in [facility] ICU at 1 pm.    He has gone through a second surgery, and is painful at the surgical site. Afebriel,vss. Heart regular, abd soft, lungs clear, groin has a large bandage. Sodium 129Low  MMOL/L    Potassium 4.3 MMOL/L    Chloride 95Low  mMol/L    CO2 18Low  MMOL/L    Anion Gap 16    BUN 51High  MG/DL    CREATININE 3.7High  MG/DL    Glucose 187High  MG/DL    Calcium 7.7Low  MG/DL    GFR Non-African American 18Low  mL/min/1.73m2      WBC 14. 9High  K/CU MM    RBC 3. 02Low  M/CU MM    Hemoglobin 8.7Low  GM/DL    Hematocrit 28.1Low  %    MCV 93.0 FL    MCH 28.8 PG    MCHC 31.0Low  %    RDW 13.2 %    Platelets 471 K/CU MM    MPV 10.2 FL     Pt is s/p debridement for tolu's gangrene, and is on antibiotics. Continue current therapy, glucose control is better.    Jayden Colunga MD

## 2021-05-17 NOTE — PROGRESS NOTES
Nephrology Progress Note  5/17/2021 7:03 AM        Subjective:   Admit Date: 5/13/2021  PCP: Claudio Keith MD    Interval History: scrotal pain ,     Diet: NPO for ? surgery    ROS:  No overt sob , wt gain   Edema , abd discomfort     Data:     Current meds:    clindamycin (CLEOCIN) IV  900 mg Intravenous Q8H    piperacillin-tazobactam  4,500 mg Intravenous Q8H    insulin lispro  25 Units Subcutaneous TID WC    aspirin  81 mg Oral Daily    sodium chloride flush  5-40 mL Intravenous 2 times per day    enoxaparin  30 mg Subcutaneous Daily    guaiFENesin  1,200 mg Oral BID    vancomycin (VANCOCIN) intermittent dosing (placeholder)   Other See Admin Instructions    insulin glargine  60 Units Subcutaneous Nightly      IV infusion builder 100 mL/hr at 05/16/21 2321    dextrose      sodium chloride           I/O last 3 completed shifts: In: 805 [I.V.:500; IV Piggyback:305]  Out: 3627 [Urine:2725; Blood:10]    CBC:   Recent Labs     05/14/21  0732 05/15/21  0657 05/16/21  0347   WBC 12.5* 15.1* 14.9*   HGB 9.1* 8.9* 8.7*    184 252          Recent Labs     05/14/21  0732 05/15/21  0657 05/16/21  0347   * 129* 129*   K 4.0 4.5 4.3   CL 97* 96* 95*   CO2 19* 16* 18*   BUN 40* 45* 51*   CREATININE 3.2* 3.3* 3.7*   GLUCOSE 208* 135* 187*       Lab Results   Component Value Date    CALCIUM 7.7 (L) 05/16/2021    PHOS 4.2 03/29/2021       Objective:     Vitals: /69   Pulse 85   Temp 98.4 °F (36.9 °C) (Oral)   Resp 18   Ht 5' 9\" (1.753 m)   Wt 294 lb 14.4 oz (133.8 kg)   SpO2 95%   BMI 43.55 kg/m²     General appearance:  Mild distress from scrotal pain   HEENT:  Mild conj pallor  Neck:  supple  Lungs:  No gross crackles yet  Heart:  RRR  Abdomen: soft, slightly distended , + Bs  Extremities:  ++ edema LE  scrotum with support       Problem List :         Impression :     1.  CARMEN- CKD stage 3 AA- sec to sepsis- with underlying ND/DKD/ likely has ATI but has risk for AIN   2. Scrotal Gangrene ( Irene-  Gangrene ) S/p debridement  - need more   3. edema - he is retaining  Salt and  water   4. Met acidosis from CARMEN    Recommendation/Plan  :     1. He is on several abx so watch fro AIN  2. Stop IVF after surgery he is overloaded   3. May need diuretics   4. Also redo ua   5. Cbc with diff   6.  Daily wt  7. Labs in am       Franc Franco MD MD

## 2021-05-18 PROBLEM — F33.42 RECURRENT MAJOR DEPRESSIVE DISORDER, IN FULL REMISSION (HCC): Chronic | Status: ACTIVE | Noted: 2021-05-18

## 2021-05-18 PROBLEM — F41.1 GENERALIZED ANXIETY DISORDER: Chronic | Status: ACTIVE | Noted: 2021-05-18

## 2021-05-18 LAB
ALBUMIN SERPL-MCNC: 2.4 GM/DL (ref 3.4–5)
ALP BLD-CCNC: 136 IU/L (ref 40–128)
ALT SERPL-CCNC: 12 U/L (ref 10–40)
ANION GAP SERPL CALCULATED.3IONS-SCNC: 15 MMOL/L (ref 4–16)
AST SERPL-CCNC: 14 IU/L (ref 15–37)
BANDED NEUTROPHILS ABSOLUTE COUNT: 2.66 K/CU MM
BANDED NEUTROPHILS RELATIVE PERCENT: 20 % (ref 5–11)
BASOPHILS ABSOLUTE: 0.1 K/CU MM
BASOPHILS RELATIVE PERCENT: 1 % (ref 0–1)
BILIRUB SERPL-MCNC: 0.4 MG/DL (ref 0–1)
BUN BLDV-MCNC: 51 MG/DL (ref 6–23)
CALCIUM SERPL-MCNC: 7.5 MG/DL (ref 8.3–10.6)
CHLORIDE BLD-SCNC: 98 MMOL/L (ref 99–110)
CO2: 20 MMOL/L (ref 21–32)
CREAT SERPL-MCNC: 3.4 MG/DL (ref 0.9–1.3)
CULTURE: ABNORMAL
CULTURE: NORMAL
CULTURE: NORMAL
DIFFERENTIAL TYPE: ABNORMAL
EOSINOPHILS ABSOLUTE: 0.3 K/CU MM
EOSINOPHILS RELATIVE PERCENT: 2 % (ref 0–3)
GFR AFRICAN AMERICAN: 24 ML/MIN/1.73M2
GFR NON-AFRICAN AMERICAN: 20 ML/MIN/1.73M2
GLUCOSE BLD-MCNC: 120 MG/DL (ref 70–99)
GLUCOSE BLD-MCNC: 129 MG/DL (ref 70–99)
GLUCOSE BLD-MCNC: 202 MG/DL (ref 70–99)
GLUCOSE BLD-MCNC: 214 MG/DL (ref 70–99)
GLUCOSE BLD-MCNC: 223 MG/DL (ref 70–99)
GLUCOSE BLD-MCNC: 232 MG/DL (ref 70–99)
GLUCOSE BLD-MCNC: 242 MG/DL (ref 70–99)
HCT VFR BLD CALC: 26.7 % (ref 42–52)
HEMOGLOBIN: 8.6 GM/DL (ref 13.5–18)
LYMPHOCYTES ABSOLUTE: 1.1 K/CU MM
LYMPHOCYTES RELATIVE PERCENT: 8 % (ref 24–44)
Lab: ABNORMAL
Lab: NORMAL
Lab: NORMAL
MAGNESIUM: 3 MG/DL (ref 1.8–2.4)
MCH RBC QN AUTO: 29 PG (ref 27–31)
MCHC RBC AUTO-ENTMCNC: 32.2 % (ref 32–36)
MCV RBC AUTO: 89.9 FL (ref 78–100)
METAMYELOCYTES ABSOLUTE COUNT: 0.13 K/CU MM
METAMYELOCYTES PERCENT: 1 %
MONOCYTES ABSOLUTE: 1.1 K/CU MM
MONOCYTES RELATIVE PERCENT: 8 % (ref 0–4)
MYELOCYTE PERCENT: 1 %
MYELOCYTES ABSOLUTE COUNT: 0.13 K/CU MM
PDW BLD-RTO: 13 % (ref 11.7–14.9)
PHOSPHORUS: 5.5 MG/DL (ref 2.5–4.9)
PLATELET # BLD: 340 K/CU MM (ref 140–440)
PMV BLD AUTO: 9.7 FL (ref 7.5–11.1)
POLYCHROMASIA: ABNORMAL
POTASSIUM SERPL-SCNC: 4 MMOL/L (ref 3.5–5.1)
RBC # BLD: 2.97 M/CU MM (ref 4.6–6.2)
SEGMENTED NEUTROPHILS ABSOLUTE COUNT: 7.8 K/CU MM
SEGMENTED NEUTROPHILS RELATIVE PERCENT: 59 % (ref 36–66)
SODIUM BLD-SCNC: 133 MMOL/L (ref 135–145)
SPECIMEN: ABNORMAL
SPECIMEN: NORMAL
SPECIMEN: NORMAL
TOTAL PROTEIN: 6 GM/DL (ref 6.4–8.2)
WBC # BLD: 13.3 K/CU MM (ref 4–10.5)
WBC # BLD: ABNORMAL 10*3/UL

## 2021-05-18 PROCEDURE — 36415 COLL VENOUS BLD VENIPUNCTURE: CPT

## 2021-05-18 PROCEDURE — 6360000002 HC RX W HCPCS: Performed by: SURGERY

## 2021-05-18 PROCEDURE — 80053 COMPREHEN METABOLIC PANEL: CPT

## 2021-05-18 PROCEDURE — 2500000003 HC RX 250 WO HCPCS: Performed by: SURGERY

## 2021-05-18 PROCEDURE — 2580000003 HC RX 258: Performed by: SURGERY

## 2021-05-18 PROCEDURE — 83735 ASSAY OF MAGNESIUM: CPT

## 2021-05-18 PROCEDURE — 85027 COMPLETE CBC AUTOMATED: CPT

## 2021-05-18 PROCEDURE — 84100 ASSAY OF PHOSPHORUS: CPT

## 2021-05-18 PROCEDURE — 85007 BL SMEAR W/DIFF WBC COUNT: CPT

## 2021-05-18 PROCEDURE — 1200000000 HC SEMI PRIVATE

## 2021-05-18 PROCEDURE — 94761 N-INVAS EAR/PLS OXIMETRY MLT: CPT

## 2021-05-18 PROCEDURE — 82962 GLUCOSE BLOOD TEST: CPT

## 2021-05-18 PROCEDURE — 99232 SBSQ HOSP IP/OBS MODERATE 35: CPT | Performed by: SURGERY

## 2021-05-18 PROCEDURE — 94150 VITAL CAPACITY TEST: CPT

## 2021-05-18 PROCEDURE — 6370000000 HC RX 637 (ALT 250 FOR IP): Performed by: SURGERY

## 2021-05-18 PROCEDURE — 99232 SBSQ HOSP IP/OBS MODERATE 35: CPT | Performed by: NURSE PRACTITIONER

## 2021-05-18 RX ADMIN — HYDROMORPHONE HYDROCHLORIDE 0.5 MG: 1 INJECTION, SOLUTION INTRAMUSCULAR; INTRAVENOUS; SUBCUTANEOUS at 00:55

## 2021-05-18 RX ADMIN — OXYCODONE HYDROCHLORIDE AND ACETAMINOPHEN 1 TABLET: 5; 325 TABLET ORAL at 17:22

## 2021-05-18 RX ADMIN — OXYCODONE HYDROCHLORIDE AND ACETAMINOPHEN 1 TABLET: 5; 325 TABLET ORAL at 22:15

## 2021-05-18 RX ADMIN — GUAIFENESIN 1200 MG: 600 TABLET, EXTENDED RELEASE ORAL at 08:32

## 2021-05-18 RX ADMIN — PIPERACILLIN SODIUM AND TAZOBACTAM SODIUM 4500 MG: 4; .5 INJECTION, POWDER, LYOPHILIZED, FOR SOLUTION INTRAVENOUS at 05:20

## 2021-05-18 RX ADMIN — OXYCODONE HYDROCHLORIDE AND ACETAMINOPHEN 1 TABLET: 5; 325 TABLET ORAL at 02:13

## 2021-05-18 RX ADMIN — PIPERACILLIN SODIUM AND TAZOBACTAM SODIUM 4500 MG: 4; .5 INJECTION, POWDER, LYOPHILIZED, FOR SOLUTION INTRAVENOUS at 16:12

## 2021-05-18 RX ADMIN — ENOXAPARIN SODIUM 30 MG: 30 INJECTION SUBCUTANEOUS at 08:33

## 2021-05-18 RX ADMIN — HYDROMORPHONE HYDROCHLORIDE 0.5 MG: 1 INJECTION, SOLUTION INTRAMUSCULAR; INTRAVENOUS; SUBCUTANEOUS at 11:06

## 2021-05-18 RX ADMIN — DEXTROSE MONOHYDRATE 900 MG: 50 INJECTION, SOLUTION INTRAVENOUS at 05:20

## 2021-05-18 RX ADMIN — ZOLPIDEM TARTRATE 5 MG: 5 TABLET ORAL at 22:15

## 2021-05-18 RX ADMIN — HYDROMORPHONE HYDROCHLORIDE 0.5 MG: 1 INJECTION, SOLUTION INTRAMUSCULAR; INTRAVENOUS; SUBCUTANEOUS at 05:19

## 2021-05-18 RX ADMIN — HYDROMORPHONE HYDROCHLORIDE 0.5 MG: 1 INJECTION, SOLUTION INTRAMUSCULAR; INTRAVENOUS; SUBCUTANEOUS at 16:02

## 2021-05-18 RX ADMIN — DEXTROSE MONOHYDRATE 900 MG: 50 INJECTION, SOLUTION INTRAVENOUS at 16:12

## 2021-05-18 RX ADMIN — DEXTROSE MONOHYDRATE 900 MG: 50 INJECTION, SOLUTION INTRAVENOUS at 22:16

## 2021-05-18 RX ADMIN — PIPERACILLIN SODIUM AND TAZOBACTAM SODIUM 4500 MG: 4; .5 INJECTION, POWDER, LYOPHILIZED, FOR SOLUTION INTRAVENOUS at 22:16

## 2021-05-18 RX ADMIN — HYDROMORPHONE HYDROCHLORIDE 0.5 MG: 1 INJECTION, SOLUTION INTRAMUSCULAR; INTRAVENOUS; SUBCUTANEOUS at 21:11

## 2021-05-18 RX ADMIN — ASPIRIN 81 MG: 81 TABLET, COATED ORAL at 08:33

## 2021-05-18 RX ADMIN — GUAIFENESIN 1200 MG: 600 TABLET, EXTENDED RELEASE ORAL at 21:11

## 2021-05-18 RX ADMIN — SODIUM CHLORIDE, PRESERVATIVE FREE 10 ML: 5 INJECTION INTRAVENOUS at 21:12

## 2021-05-18 RX ADMIN — SODIUM CHLORIDE, PRESERVATIVE FREE 20 ML: 5 INJECTION INTRAVENOUS at 08:33

## 2021-05-18 ASSESSMENT — PAIN DESCRIPTION - DESCRIPTORS
DESCRIPTORS: ACHING;BURNING

## 2021-05-18 ASSESSMENT — PAIN SCALES - GENERAL
PAINLEVEL_OUTOF10: 8
PAINLEVEL_OUTOF10: 7
PAINLEVEL_OUTOF10: 9

## 2021-05-18 ASSESSMENT — PAIN DESCRIPTION - LOCATION
LOCATION: PERINEUM

## 2021-05-18 ASSESSMENT — PAIN DESCRIPTION - PROGRESSION
CLINICAL_PROGRESSION: GRADUALLY WORSENING
CLINICAL_PROGRESSION: NOT CHANGED
CLINICAL_PROGRESSION: GRADUALLY WORSENING

## 2021-05-18 ASSESSMENT — PAIN - FUNCTIONAL ASSESSMENT
PAIN_FUNCTIONAL_ASSESSMENT: ACTIVITIES ARE NOT PREVENTED
PAIN_FUNCTIONAL_ASSESSMENT: ACTIVITIES ARE NOT PREVENTED

## 2021-05-18 ASSESSMENT — PAIN DESCRIPTION - FREQUENCY
FREQUENCY: CONTINUOUS

## 2021-05-18 ASSESSMENT — PAIN DESCRIPTION - ONSET
ONSET: ON-GOING

## 2021-05-18 ASSESSMENT — PAIN DESCRIPTION - PAIN TYPE
TYPE: ACUTE PAIN
TYPE: SURGICAL PAIN

## 2021-05-18 NOTE — CONSULTS
Initial Psychiatric Consult    Alondra Soto  [id number]  5/13/2021 05/18/21    ID: Patient is a 39 yrs y.o. male    CC: \"I was in a tremendous amount of pain, so I came to the hospital.\"    HPI: Alondra Soto is a 39 y.o. male with past medical history of Abscess, chronic renal failure (ARF Diabetic nephropathy ([address] Diabetic neuropathy ([address] Diabetic ulcer of left midfoot , Pericarditis, Peripheral autonomic neuropathy, DM with A1c 10.5 .who presented to ER on 5/13/2021 with acute scrotum pain, redness and swelling which started 5 days ago. Pt denied any trauma , or previous experience before . The pain got worse over time and it is severe by today. Also pt stated that he has SOB and chest pain started yesterday. In ER lab data revealed leukocytosis, hyponatremia and ARF. Testicles and scrotum  revealed prior epididymitis. History obtained from patient. Today's interview was conducted using the [employer] audiovisual machine [place] [address] and the interviewee located at [facility]. During today's interview, the patient was awake, alert & oriented x 3. He denies SI/HI and AV hallucinations. He denies depression and anxiety. States his sleep has been somewhat interrupted. Notes that his appetite is \"OK. \" He denies any previous suicide attempts or inpatient psychiatric hospitalizations. States he does not recall making any statements about wanting to end his own life. Notes that \"I was in a tremendous amount of pain and was taking pain pills. \" He notes that his PCP, Dr. Roney Washington treats his depression and anxiety. He does not believe that he needs any medication at this juncture. Pt was polite and cordial during the interview process. Past Psychiatric History: anxiety and depression.     Family Psychiatric History:   Family History   Problem Relation Age of Onset    Cancer Mother         ?site    Stroke Mother     Bleeding Prob Mother     Diabetes Father     Heart Disease Father     High Blood Pressure Father     Obesity Father     Kidney Disease Father     Diabetes Sister     High Blood Pressure Sister     Mental Illness Sister     Obesity Sister     High Blood Pressure Other     Mental Illness Other         bipolar    Other Son         cyst in ear canal    ADHD Daughter         Allergies: Allergies   Allergen Reactions    Adhesive Tape Rash    Doxycycline Nausea And Vomiting    Reglan [Metoclopramide] Anxiety        OBJECTIVE  Vital Signs:  Vitals:    05/18/21 1500   BP: (!) 163/85   Pulse: 83   Resp: 17   Temp: 98.3 °F (36.8 °C)   SpO2: 98%       Labs:  Recent Results (from the past 48 hour(s))   POCT Glucose    Collection Time: 05/16/21  5:26 PM   Result Value Ref Range    POC Glucose 274 (H) 70 - 99 MG/DL   POCT Glucose    Collection Time: 05/16/21 10:29 PM   Result Value Ref Range    POC Glucose 280 (H) 70 - 99 MG/DL   CBC    Collection Time: 05/17/21  7:22 AM   Result Value Ref Range    WBC 10.6 (H) 4.0 - 10.5 K/CU MM    RBC 2.46 (L) 4.6 - 6.2 M/CU MM    Hemoglobin 7.3 (L) 13.5 - 18.0 GM/DL    Hematocrit 21.4 (L) 42 - 52 %    MCV 87.0 78 - 100 FL    MCH 29.7 27 - 31 PG    MCHC 34.1 32.0 - 36.0 %    RDW 13.2 11.7 - 14.9 %    Platelets 015 712 - 243 K/CU MM    MPV 10.2 7.5 - 11.1 FL   Vancomycin, random    Collection Time: 05/17/21  7:22 AM   Result Value Ref Range    Vancomycin Rm 11.7 UG/ML    DOSE AMOUNT DOSE AMT.  GIVEN - none, random level     DOSE TIME DOSE TIME GIVEN - none, random level    POCT Glucose    Collection Time: 05/17/21  9:13 AM   Result Value Ref Range    POC Glucose 347 (H) 70 - 99 MG/DL   POCT Glucose    Collection Time: 05/17/21 11:40 AM   Result Value Ref Range    POC Glucose 333 (H) 70 - 99 MG/DL   Urinalysis    Collection Time: 05/17/21  3:30 PM   Result Value Ref Range    Color, UA YELLOW YELLOW    Clarity, UA SLIGHTLY CLOUDY (A) CLEAR    Glucose, Urine 150 (A) MM    RBC 2.97 (L) 4.6 - 6.2 M/CU MM    Hemoglobin 8.6 (L) 13.5 - 18.0 GM/DL    Hematocrit 26.7 (L) 42 - 52 %    MCV 89.9 78 - 100 FL    MCH 29.0 27 - 31 PG    MCHC 32.2 32.0 - 36.0 %    RDW 13.0 11.7 - 14.9 %    Platelets 102 685 - 918 K/CU MM    MPV 9.7 7.5 - 11.1 FL    Myelocyte Percent 1 (H) 0.0 %    Metamyelocytes Relative 1 (H) 0.0 %    Bands Relative 20 (H) 5 - 11 %    Segs Relative 59.0 36 - 66 %    Eosinophils % 2.0 0 - 3 %    Basophils % 1.0 0 - 1 %    Lymphocytes % 8.0 (L) 24 - 44 %    Monocytes % 8.0 (H) 0 - 4 %    Myelocytes Absolute 0.13 K/CU MM    Metamyelocytes Absolute 0.13 K/CU MM    Bands Absolute 2.66 K/CU MM    Segs Absolute 7.8 K/CU MM    Eosinophils Absolute 0.3 K/CU MM    Basophils Absolute 0.1 K/CU MM    Lymphocytes Absolute 1.1 K/CU MM    Monocytes Absolute 1.1 K/CU MM    Differential Type MANUAL DIFFERENTIAL     Polychromasia 1+     WBC Morphology RARE    POCT Glucose    Collection Time: 05/18/21  8:25 AM   Result Value Ref Range    POC Glucose 232 (H) 70 - 99 MG/DL   POCT Glucose    Collection Time: 05/18/21 11:13 AM   Result Value Ref Range    POC Glucose 202 (H) 70 - 99 MG/DL       Review of Systems:  Reports of no current cardiovascular, respiratory, gastrointestinal, genitourinary, integumentary, neurological, muscuoskeletal, or immunological symptoms today. PSYCHIATRIC: See HPI above.     PSYCHIATRIC EXAMINATION / MENTAL STATUS EXAM    CONSTITUTIONAL:    Vitals:   Vitals:    05/18/21 1500   BP: (!) 163/85   Pulse: 83   Resp: 17   Temp: 98.3 °F (36.8 °C)   SpO2: 98%      General appearance: [x] appears age, []  appears older than stated age,               [x]  adequately dressed and groomed, [] disheveled,               [x]  in no acute distress, [] appears mildly distressed, [] other           MUSCULOSKELETAL:   Gait:   [] normal, [] antalgic, [] unsteady, [x] gait not evaluated   Station:             [] erect, [] sitting, [x] recumbent, [] other        Strength/tone:  [x] muscle strength and tone appear consistent with age and condition     [] atrophy      [] abnormal movements  PSYCHIATRIC:    Appearance: Appears stated age. Alert and oriented to person, place, time & situation. No acute distress. Adequate grooming and hygiene. Good eye contact. No prominent physical abnormalities. Attitude: Manner is cooperative and pleasant  Motor: No psychomotor agitation, retardation or abnormal movements noted  Speech: Clearly articulated; normal rate, volume, tone & amount. Language: intact understanding and production  Mood: euthymic  Affect: euthymic, full range, non-labile, congruent with mood and content of speech  Thought Production: Spontaneous. Thought Form: Coherent, linear, logical & goal-directed. No tangentiality or circumstantiality. No flight of ideas or loosening of associations. Thought Content/Perceptions: No EVANGELISTA, no AVH, no delusion  Insight: adequate  Judgment: adequate  Memory: Immediate, recent, and remote appear intact, though not formally tested. Attention: maintained throughout interview  Fund of knowledge: Average  Gait/Balance: WNL/WNL    Impression:   Recurrent major depressive disorder, in full remission  Generalized anxiety disorder    Problem List:   Irene gangrene    Plan:  1. Patient stable from a psychiatric standpoint for outpatient follow up  2. DC sitter and suicide precautions    Thank you for allowing us to participate in the care of your patient.     Electronically signed by APPLE Car CNP on 5/18/2021 at 3:22 PM

## 2021-05-18 NOTE — PROGRESS NOTES
When attempting to follow the suicidal ideations precaution checklist the patient became very upset stating that he did not want us to take his cell phone. I explained the hospital policy and had to call security because the patient was unwilling to follow the policy. The patient asked to leave AMA. The hospitalist was notified for a pink slip due to the psych note placed by Don Bruce NP 5/17/21 and because the patient confirmed to me that he does have these types of feelings. thoughts when he is at home alone. Security removed all items and the charge nurse placed them in a secure area. A fan was left in the patients room on the counter away frmo patient reach because the patient states that he can not breath well without a fan blowing on him. The patient did appear SOB and panicking without the fan. PSC at bedside with checklist completed.

## 2021-05-18 NOTE — PLAN OF CARE
Problem: Falls - Risk of:  Goal: Will remain free from falls  Description: Will remain free from falls  Outcome: Ongoing  Goal: Absence of physical injury  Description: Absence of physical injury  Outcome: Ongoing     Problem: Pain:  Goal: Pain level will decrease  Description: Pain level will decrease  Outcome: Ongoing  Goal: Control of acute pain  Description: Control of acute pain  Outcome: Ongoing  Goal: Control of chronic pain  Description: Control of chronic pain  Outcome: Ongoing     Problem: Discharge Planning:  Goal: Discharged to appropriate level of care  Description: Discharged to appropriate level of care  Outcome: Ongoing     Problem:  Body Temperature - Imbalanced:  Goal: Ability to maintain a body temperature in the normal range will improve  Description: Ability to maintain a body temperature in the normal range will improve  Outcome: Ongoing     Problem: Mobility - Impaired:  Goal: Mobility will improve to maximum level  Description: Mobility will improve to maximum level  Outcome: Ongoing     Problem: Pain:  Goal: Pain level will decrease  Description: Pain level will decrease  Outcome: Ongoing  Goal: Control of acute pain  Description: Control of acute pain  Outcome: Ongoing  Goal: Control of chronic pain  Description: Control of chronic pain  Outcome: Ongoing     Problem: Skin Integrity - Impaired:  Goal: Will show no infection signs and symptoms  Description: Will show no infection signs and symptoms  Outcome: Ongoing  Goal: Absence of new skin breakdown  Description: Absence of new skin breakdown  Outcome: Ongoing     Problem: Skin Integrity:  Goal: Will show no infection signs and symptoms  Description: Will show no infection signs and symptoms  Outcome: Ongoing  Goal: Absence of new skin breakdown  Description: Absence of new skin breakdown  Outcome: Ongoing

## 2021-05-18 NOTE — PROGRESS NOTES
[address] [facility]  consulted by Dr. Yumi Nicole for monitoring and adjustment. Indication for treatment: SSTI  Goal trough: 10-15 mcg/mL  AUC/PAMELA: <500    Pertinent Laboratory Values:   Temp Readings from Last 3 Encounters:   05/18/21 98 °F (36.7 °C) (Oral)   05/16/21 98.6 °F (37 °C)   05/15/21 98.6 °F (37 °C)     Recent Labs     05/16/21  0347 05/17/21  0722 05/18/21  0819   WBC 14.9* 10.6* 13.3*     Recent Labs     05/16/21  0347 05/18/21  0819   BUN 51* 51*   CREATININE 3.7* 3.4*     Estimated Creatinine Clearance: 37 mL/min (A) (based on SCr of 3.4 mg/dL (H)). Intake/Output Summary (Last 24 hours) at 5/18/2021 1247  Last data filed at 5/18/2021 0520  Gross per 24 hour   Intake 320 ml   Output 1600 ml   Net -1280 ml       Pertinent Cultures:  Date    Source    Results  5/13   Blood    NGTD  5/15   Urine    NGTD  5/15   Surgical   Pending    Vancomycin level:   TROUGH:  No results for input(s): VANCOTROUGH in the last 72 hours.   RANDOM:    Recent Labs     05/16/21 0347 05/17/21  0722   VANCORANDOM 18.2 11.7       Assessment:  · WBC and temperature: WBC @ 13.3; afebrile  · SCr, BUN, and urine output:   · CARMEN on CKD3  · Recovering per nephrology  · Day(s) of therapy: #6  · Vancomycin concentration:   · 5/14: 13.6, therapeutic  · 5/15:  8.8, OK to re-dose  · 5/16:  18.2, supra-therapeutic  · 5/17: 11.7, OK to re-dose  · 5/19: to be collected    Plan:  · Continue intermittent vancomycin dosing  · Last received vancomycin 1500 mg x1 on 5/17  · No vancomycin is due today  · Repeat next random level tomorrow AM  · Pharmacy will continue to monitor patient and adjust therapy as indicated    Marleni 3 5/19 @ 0600    Thank you for the consult,  Christie Herman, [facility], [address] [place] [address] [place]   5/18/2021 12:47 PM

## 2021-05-18 NOTE — PROGRESS NOTES
[facility] [address], [address]   Progress Note  [facility] 0 1 2      Date: 2021   Patient: Susan San   : 1975   DOA: 2021   MRN: [id number]   ROOM#: [id number]     Admit Date: 2021     Collaborating Urologist on Call at time of admission: Dr. Rah Concepcion    CC: Scrotal Swelling   s/p Scrotal Exploration with Scrotal and Perineal Debridement with Dr. Rah Concepcion and Dr. Rosie Roque on 5/15/21  s/p repeat scrotal/perineal debridement on 21    Subjective:     Pain: mild, no nausea and no vomiting,   Bowel Movement/Flatus:   Yes  Voiding: easily     Patient was placed on suicide protocol, has a sitter. When his cell phone was removed yesterday was noted that he attempted to leave AMA and was pink slipped. Patient notes that when nursing staff arrived to his room last night to remove his cell phone he was confused as to why they were removing it now and not when he was first placed on suicide watch. He notes that he felt \"humilated\" as he was \"stripped down\" in front of numerous nursing staff and a  and placed in a green hospital gown and was \"threatened\" with Haldol and wrist restraints if he did not comply by giving up his cell phone. Patient extremely upset over the events of last night and has reached out to Patient Relations. I offered support.      Objective:    Vitals:    BP (!) 147/71 Comment: notified LPN Mariposa  Pulse 82   Temp 98 °F (36.7 °C) (Oral)   Resp 16   Ht 5' 9\" (1.753 m)   Wt 293 lb 3.2 oz (133 kg)   SpO2 97%   BMI 43.30 kg/m²    Temp  Av.2 °F (36.8 °C)  Min: 98 °F (36.7 °C)  Max: 98.4 °F (36.9 °C)       Intake/Output Summary (Last 24 hours) at 2021 0895  Last data filed at 2021 0520  Gross per 24 hour   Intake 320 ml   Output 2000 ml   Net -1680 ml       Physical Exam:   General appearance: alert, appears stated age, cooperative, no distress and morbidly obese  Head: Normocephalic, without obvious abnormality, atraumatic  Abdomen: protuberant abdomen, soft, non-tender, non-distended  Male genitalia: mildly buried penis, condom catheter in place, urine clear/yellow, s/p scrotal exploration/debridement, wound edges appear healthy, wound vac in place    Labs:   WBC:    Lab Results   Component Value Date    WBC 13.3 05/18/2021      Hemoglobin/Hematocrit:    Lab Results   Component Value Date    HGB 8.6 05/18/2021    HCT 26.7 05/18/2021      BMP:   Lab Results   Component Value Date     05/16/2021    K 4.3 05/16/2021    CL 95 05/16/2021    CO2 18 05/16/2021    BUN 51 05/16/2021    LABALBU 2.7 04/09/2021    LABALBU 48 03/27/2021    CREATININE 3.7 05/16/2021    CALCIUM 7.7 05/16/2021    GFRAA 22 05/16/2021    LABGLOM 18 05/16/2021      PT/INR:    Lab Results   Component Value Date    PROTIME 11.8 03/22/2019    PROTIME 12.5 12/13/2010    INR 1.02 03/22/2019      PTT:    Lab Results   Component Value Date    APTT 30.8 03/22/2019        Blood Culture:  5/13/21 Negative   Urine Culture:  5/15/21 Negative   Surgical Culture: 5/15/21   DIPHTHEROIDS Light growth No further workup    Culture Abnormal  05/15/2021  4:18 PM [address] [facility]   STAPHYLOCOCCUS EPIDERMIDIS Rare growth No further workup    Culture Abnormal  05/15/2021  4:18 PM [facility] Moderate growth No further workup Sensitivities not routinely done. Drugs of choice are: Penicillin G, Metronidazole, Clindamycin or Piperacillin/Tazobactam.       Imaging:   CT PELVIS WO CONTRAST Additional Contrast? None    Result Date: 5/15/2021  EXAMINATION: CT OF THE PELVIS WITHOUT CONTRAST 5/15/2021 10:57 am TECHNIQUE: CT of the pelvis was performed without the administration of intravenous contrast.  Multiplanar reformatted images are provided for review. Dose modulation, iterative reconstruction, and/or weight based adjustment of the mA/kV was utilized to reduce the radiation dose to as low as reasonably achievable.  COMPARISON: Scrotal ultrasound 05/13/2021, CT pelvis 01/10/2019. HISTORY: ORDERING SYSTEM PROVIDED HISTORY: scrotal cellulitis, rule out perineal and scrotal abscess TECHNOLOGIST PROVIDED HISTORY: Reason for exam:->scrotal cellulitis, rule out perineal and scrotal abscess Additional Contrast?->None Reason for Exam: scrotal cellulitis, rule out perineal and scrotal abscess Acuity: Acute Type of Exam: Initial FINDINGS: There is a large amount of stranding throughout the perineum which extends into the scrotum. There is associated soft tissue gas. 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 No abscess, although there is extensive scrotal edema. The urinary bladder is nondistended. There are mildly prominent bilateral inguinal lymph nodes, measuring up to 1.4 cm, which are most likely reactive. Visualized bowel loops in the pelvis are unremarkable. The appendix is normal. No suspicious osteolytic or osteoblastic lesions are demonstrated. 1. Stranding and gas within the perineum and base of scrotum. 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 Surgical consultation is recommended. 2. Extensive scrotal edema. 3. No abscess. Results of this examination were verbally communicated to the patient's nurse, Liliana Perez, at 1:16 p.m. on 05/15/2021 by the [facility] SCROTUM AND TESTICLES    Result Date: 5/13/2021  EXAMINATION: ULTRASOUND OF THE SCROTUM/TESTICLES WITH COLOR DOPPLER FLOW EVALUATION; DOPPLER EVALUATION OF THE PELVIS 5/13/2021 COMPARISON: None.  HISTORY: ORDERING SYSTEM PROVIDED HISTORY: Right-sided testicular swelling TECHNOLOGIST PROVIDED HISTORY: Reason for exam:->Right-sided testicular swelling Reason for Exam: Rt pain and swelling Acuity: Acute Type of Exam: Initial; ORDERING SYSTEM PROVIDED HISTORY: Rt swelling TECHNOLOGIST PROVIDED HISTORY: Reason for exam:->Rt swelling Reason for Exam: Rt pain and swelling Acuity: Acute Type of Exam: Initial FINDINGS: Measurements: Right testicle: 2.9 x 3.1 x 3.7 cm Left testicle: 2.5 x 3.0 x 3.8 cm Right: Grey scale: The right testicle demonstrates normal homogeneous echotexture without focal lesion. No evidence of testicular microlithiasis. Doppler Evaluation:  There is normal arterial and venous Doppler flow within the testicle. Scrotal Sac:  No evidence of hydrocele. Scrotal wall edema is noted. Epididymis: The epididymis is enlarged and heterogeneous but does not demonstrate increased flow. Left: Grey scale: The left testicle demonstrates normal homogeneous echotexture without focal lesion. No evidence of testicular microlithiasis. Doppler Evaluation:  There is normal arterial and venous Doppler flow within the testicle. Scrotal Sac: There is a trace hydrocele. Scrotal wall edema is noted. Epididymis: The left epididymis could not be identified. 1. Normal flow in both testicles. 2. Appearance of the right epididymis suggests prior epididymitis. US DUP ABD PEL RETRO SCROT COMPLETE    Result Date: 5/13/2021  EXAMINATION: ULTRASOUND OF THE SCROTUM/TESTICLES WITH COLOR DOPPLER FLOW EVALUATION; DOPPLER EVALUATION OF THE PELVIS 5/13/2021 COMPARISON: None. HISTORY: ORDERING SYSTEM PROVIDED HISTORY: Right-sided testicular swelling TECHNOLOGIST PROVIDED HISTORY: Reason for exam:->Right-sided testicular swelling Reason for Exam: Rt pain and swelling Acuity: Acute Type of Exam: Initial; ORDERING SYSTEM PROVIDED HISTORY: Rt swelling TECHNOLOGIST PROVIDED HISTORY: Reason for exam:->Rt swelling Reason for Exam: Rt pain and swelling Acuity: Acute Type of Exam: Initial FINDINGS: Measurements: Right testicle: 2.9 x 3.1 x 3.7 cm Left testicle: 2.5 x 3.0 x 3.8 cm Right: Grey scale: The right testicle demonstrates normal homogeneous echotexture without focal lesion. No evidence of testicular microlithiasis. Doppler Evaluation:  There is normal arterial and venous Doppler flow within the testicle.  Scrotal Sac:  No evidence of hydrocele. Scrotal wall edema is noted. Epididymis: The epididymis is enlarged and heterogeneous but does not demonstrate increased flow. Left: Grey scale: The left testicle demonstrates normal homogeneous echotexture without focal lesion. No evidence of testicular microlithiasis. Doppler Evaluation:  There is normal arterial and venous Doppler flow within the testicle. Scrotal Sac: There is a trace hydrocele. Scrotal wall edema is noted. Epididymis: The left epididymis could not be identified. 1. Normal flow in both testicles. 2. Appearance of the right epididymis suggests prior epididymitis. Assessment & Plan:      Gucci Knutson is a 55y.o. year old male admitted 5/13/2021 for scrotal cellulitis. 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 Findings are most compatible with necrotizing fasciitis/Irene gangrene   POD #3 Scrotal Exploration with Scrotal and Perineal Debridement with Dr. Dewey Carey and Dr. Lord Loop   POD #2 repeat scrotal/perineal debridement   On IV Zosyn, Vanco and Clindamycin   Urine culture Negative; Blood Cultures 5/13/21 Negative x2   Surgical Culture 5/15/21 +Diphtheroids, Staph epidermidis and Clostridium innocuum   WBC 13.3, improved from 15.1 on 5/16/21, Afebrile   Continue Wound Vac, managed by Wound Care, due for change tomorrow    Will continue to follow closely     Patient seen and examined, chart reviewed.      Electronically signed by Sunshine Marquez PA-C on 5/18/2021 at 8:57 AM

## 2021-05-18 NOTE — PROGRESS NOTES
General Surgery-Dr. 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 Had wound vac placed last night. Pain controlled. Tolerating PO. Denies F/C.      ROS:  Review of Systems   Constitutional: Negative for chills and fever. Skin: Positive for wound. All other systems reviewed and are negative. Allergies  Adhesive tape, Doxycycline, and Reglan [metoclopramide]          Diagnosis Date    Abscess     scrotal    Acute renal failure (ARF) ([address] 2019    Anxiety associated with depression     Anxiety associated with depression     Back pain 2012    Chest pain 2013    Diabetic nephropathy ([address]     Diabetic neuropathy ([address] 2011    Diabetic ulcer of left midfoot associated with type 2 diabetes mellitus, with fat layer exposed ([address] 2017    Diverticulosis     C scope + Dr. Darci Silva DM (diabetes mellitus), type 2 ([address]     DR. Turner podiatry    Hyperlipidemia LDL goal < 100 7/15/2013    Hypertension     Internal hemorrhoid     C scope + Dr. Colon Mention fracture 1988    Panic attacks     Pericarditis     Hospitalized with s/p heart cath normal    Peripheral autonomic neuropathy due to diabetes mellitus ([address]     Axonal EMG- N[facility], 2011    Sebaceous cyst 2011    URI (upper respiratory infection) 2012    WD-Wound, surgical, infected, initial encounter 2017    Wrist fracture        Objective:     Vitals:    21 0514   BP: (!) 147/71   Pulse: 82   Resp: 16   Temp: 98 °F (36.7 °C)   SpO2: 97%       TEMPERATURE:  Current -Temp: 98 °F (36.7 °C); Max - Temp  Av.2 °F (36.8 °C)  Min: 98 °F (36.7 °C)  Max: 98.4 °F (36.9 °C)    No intake/output data recorded. I/O last 3 completed shifts: In: 320 [I.V.:20; IV Piggyback:300]  Out: 2000 [Urine:2000]      Physical Exam:  Physical Exam  Vitals reviewed.    Constitutional:       General: He is not in acute distress. Appearance: He is obese. He is not ill-appearing, toxic-appearing or diaphoretic. HENT:      Head: Normocephalic and atraumatic. Right Ear: External ear normal.      Left Ear: External ear normal.      Nose: Nose normal.   Eyes:      General:         Right eye: No discharge. Left eye: No discharge. Extraocular Movements: Extraocular movements intact. Cardiovascular:      Rate and Rhythm: Normal rate. Pulmonary:      Effort: No respiratory distress. Abdominal:      Palpations: Abdomen is soft. Tenderness: There is no abdominal tenderness. Genitourinary:     Comments:     +wound vac in place with good seal.     Appropriately tender  Musculoskeletal:      Cervical back: Normal range of motion. Skin:     General: Skin is warm. Neurological:      General: No focal deficit present. Mental Status: He is alert.    Psychiatric:         Mood and Affect: Mood normal.           Scheduled Meds:   insulin lispro  0-12 Units Subcutaneous TID WC    insulin lispro  0-6 Units Subcutaneous Nightly    insulin glargine  65 Units Subcutaneous Nightly    insulin lispro  20 Units Subcutaneous Lunch    insulin lispro  15 Units Subcutaneous Daily with breakfast    insulin lispro  30 Units Subcutaneous Dinner    clindamycin (CLEOCIN) IV  900 mg Intravenous Q8H    piperacillin-tazobactam  4,500 mg Intravenous Q8H    aspirin  81 mg Oral Daily    sodium chloride flush  5-40 mL Intravenous 2 times per day    enoxaparin  30 mg Subcutaneous Daily    guaiFENesin  1,200 mg Oral BID    vancomycin (VANCOCIN) intermittent dosing (placeholder)   Other See Admin Instructions     ContinuousInfusions:   dextrose      sodium chloride       PRN Meds:haloperidol lactate, HYDROmorphone **OR** HYDROmorphone, ondansetron, glucose, dextrose, glucagon (rDNA), dextrose, sodium chloride flush, sodium chloride, potassium chloride **OR** potassium alternative oral replacement **OR** potassium chloride, magnesium sulfate, promethazine **OR** ondansetron, polyethylene glycol, fleet, nicotine, acetaminophen **OR** acetaminophen, zolpidem, guaiFENesin-dextromethorphan, benzonatate, calcium carbonate, oxyCODONE-acetaminophen      Labs/Imaging Results:   Lab Results   Component Value Date    WBC 13.3 (H) 05/18/2021    HGB 8.6 (L) 05/18/2021    HCT 26.7 (L) 05/18/2021    MCV 89.9 05/18/2021     05/18/2021     Lab Results   Component Value Date     (L) 05/16/2021    K 4.3 05/16/2021    CL 95 (L) 05/16/2021    CO2 18 (L) 05/16/2021    BUN 51 (H) 05/16/2021    CREATININE 3.7 (H) 05/16/2021    GLUCOSE 187 (H) 05/16/2021    CALCIUM 7.7 (L) 05/16/2021    PROT 5.8 (L) 04/09/2021    LABALBU 2.7 (L) 04/09/2021    BILITOT 0.2 04/09/2021    ALKPHOS 58 04/09/2021    AST 10 (L) 04/09/2021    ALT 11 04/09/2021    LABGLOM 18 (L) 05/16/2021    GFRAA 22 (L) 05/16/2021       Assessment:     54 y/o M POD 2 and 3 s/p:    Initial debridement for tolu gangrene and planned second look    Plan:       -ABx per primary  -Wound care: vac or dressing changes if vac seal come loose. -Diet as tolerated.  -No plans for repeat debridement at this time as wound is viable.   -Ok for d/c to Psych inpt unit from general surgery standpoint -- d/w Care Manager.        Electronically signed by Jenny Stahl II, MD on 5/18/2021 at 9:35 AM

## 2021-05-18 NOTE — PROGRESS NOTES
Nephrology Progress Note  5/18/2021 11:10 AM        Subjective:   Admit Date: 5/13/2021  PCP: Marisa García MD    Interval History: pain better - no ac distress     Diet: good- eating berakfast    ROS:  No sob   UOP almost 2l/d   He  Has  sitter now       Data:     Current meds:    insulin lispro  0-12 Units Subcutaneous TID WC    insulin lispro  0-6 Units Subcutaneous Nightly    insulin glargine  65 Units Subcutaneous Nightly    insulin lispro  20 Units Subcutaneous Lunch    insulin lispro  15 Units Subcutaneous Daily with breakfast    insulin lispro  30 Units Subcutaneous Dinner    clindamycin (CLEOCIN) IV  900 mg Intravenous Q8H    piperacillin-tazobactam  4,500 mg Intravenous Q8H    aspirin  81 mg Oral Daily    sodium chloride flush  5-40 mL Intravenous 2 times per day    enoxaparin  30 mg Subcutaneous Daily    guaiFENesin  1,200 mg Oral BID    vancomycin (VANCOCIN) intermittent dosing (placeholder)   Other See Admin Instructions      dextrose      sodium chloride           I/O last 3 completed shifts: In: 320 [I.V.:20; IV Piggyback:300]  Out: 2000 [Urine:2000]    CBC:   Recent Labs     05/16/21  0347 05/17/21  0722 05/18/21  0819   WBC 14.9* 10.6* 13.3*   HGB 8.7* 7.3* 8.6*    245 340          Recent Labs     05/16/21  0347 05/18/21  0819   * 133*   K 4.3 4.0   CL 95* 98*   CO2 18* 20*   BUN 51* 51*   CREATININE 3.7* 3.4*   GLUCOSE 187* 223*       Lab Results   Component Value Date    CALCIUM 7.5 (L) 05/18/2021    PHOS 5.5 (H) 05/18/2021       Objective:     Vitals: BP (!) 147/71 Comment: notified LPN Mariposa  Pulse 82   Temp 98 °F (36.7 °C) (Oral)   Resp 16   Ht 5' 9\" (1.753 m)   Wt 293 lb 3.2 oz (133 kg)   SpO2 97%   BMI 43.30 kg/m²     General appearance:  No ac distress   HEENT:  + conj pallor  Neck:  supple  Lungs:  No gross crackles  Heart:  Seems RRR  Abdomen: soft  Extremities:  ++ edema LE   Scrotal wound  Vac       Problem List :         Impression :     1.  CARMEN- CKD stage 3 aA3- seems to be recovering from ATI- he  Has risk for  AIN watch - also MA getting better   2. Scrotal - and perineal infection - gangrene etc s/p surgical debriedment and with abx and wound vac  3. DM/NS/DKD     Recommendation/Plan  :     1. If possible avoid vanco- zosyn combo  2. As he is making good urine  - see how he does without loop   3. Good BS control   4. BP acceptable for now   5. Low salt diet   6.  Follow clinically and bio chemically       Manasa Jean Baptiste MD MD

## 2021-05-18 NOTE — PROGRESS NOTES
Pt still very upset about suicide protocol and stating he \"feels like a criminal\". Explained to pt that is not the intent but it is our job to keep the patient safe, so there are policies in place to help ensure safety and there is a checklist we follow.

## 2021-05-19 LAB
ALBUMIN SERPL-MCNC: 2.4 GM/DL (ref 3.4–5)
ALP BLD-CCNC: 105 IU/L (ref 40–128)
ALT SERPL-CCNC: 10 U/L (ref 10–40)
ANION GAP SERPL CALCULATED.3IONS-SCNC: 9 MMOL/L (ref 4–16)
AST SERPL-CCNC: 13 IU/L (ref 15–37)
BILIRUB SERPL-MCNC: 0.3 MG/DL (ref 0–1)
BUN BLDV-MCNC: 41 MG/DL (ref 6–23)
CALCIUM SERPL-MCNC: 7.5 MG/DL (ref 8.3–10.6)
CHLORIDE BLD-SCNC: 102 MMOL/L (ref 99–110)
CO2: 23 MMOL/L (ref 21–32)
CREAT SERPL-MCNC: 3 MG/DL (ref 0.9–1.3)
DOSE AMOUNT: NORMAL
DOSE TIME: NORMAL
ERYTHROCYTE SEDIMENTATION RATE: >120 MM/HR (ref 0–15)
GFR AFRICAN AMERICAN: 27 ML/MIN/1.73M2
GFR NON-AFRICAN AMERICAN: 23 ML/MIN/1.73M2
GLUCOSE BLD-MCNC: 138 MG/DL (ref 70–99)
GLUCOSE BLD-MCNC: 160 MG/DL (ref 70–99)
GLUCOSE BLD-MCNC: 189 MG/DL (ref 70–99)
GLUCOSE BLD-MCNC: 209 MG/DL (ref 70–99)
GLUCOSE BLD-MCNC: 220 MG/DL (ref 70–99)
HCT VFR BLD CALC: 27 % (ref 42–52)
HEMOGLOBIN: 8.6 GM/DL (ref 13.5–18)
HIGH SENSITIVE C-REACTIVE PROTEIN: 98.2 MG/L
MCH RBC QN AUTO: 29.2 PG (ref 27–31)
MCHC RBC AUTO-ENTMCNC: 31.9 % (ref 32–36)
MCV RBC AUTO: 91.5 FL (ref 78–100)
PDW BLD-RTO: 13 % (ref 11.7–14.9)
PLATELET # BLD: 360 K/CU MM (ref 140–440)
PMV BLD AUTO: 9.5 FL (ref 7.5–11.1)
POTASSIUM SERPL-SCNC: 3.9 MMOL/L (ref 3.5–5.1)
RBC # BLD: 2.95 M/CU MM (ref 4.6–6.2)
SODIUM BLD-SCNC: 134 MMOL/L (ref 135–145)
TOTAL PROTEIN: 5.8 GM/DL (ref 6.4–8.2)
VANCOMYCIN RANDOM: 13.5 UG/ML
WBC # BLD: 13.1 K/CU MM (ref 4–10.5)

## 2021-05-19 PROCEDURE — 2500000003 HC RX 250 WO HCPCS: Performed by: SURGERY

## 2021-05-19 PROCEDURE — 85027 COMPLETE CBC AUTOMATED: CPT

## 2021-05-19 PROCEDURE — 2580000003 HC RX 258: Performed by: SURGERY

## 2021-05-19 PROCEDURE — 6370000000 HC RX 637 (ALT 250 FOR IP): Performed by: INTERNAL MEDICINE

## 2021-05-19 PROCEDURE — 6360000002 HC RX W HCPCS: Performed by: SURGERY

## 2021-05-19 PROCEDURE — 97606 NEG PRS WND THER DME>50 SQCM: CPT

## 2021-05-19 PROCEDURE — 1200000000 HC SEMI PRIVATE

## 2021-05-19 PROCEDURE — 80202 ASSAY OF VANCOMYCIN: CPT

## 2021-05-19 PROCEDURE — 99232 SBSQ HOSP IP/OBS MODERATE 35: CPT | Performed by: SURGERY

## 2021-05-19 PROCEDURE — 86141 C-REACTIVE PROTEIN HS: CPT

## 2021-05-19 PROCEDURE — 6370000000 HC RX 637 (ALT 250 FOR IP): Performed by: SURGERY

## 2021-05-19 PROCEDURE — 94761 N-INVAS EAR/PLS OXIMETRY MLT: CPT

## 2021-05-19 PROCEDURE — 6360000002 HC RX W HCPCS: Performed by: FAMILY MEDICINE

## 2021-05-19 PROCEDURE — 85652 RBC SED RATE AUTOMATED: CPT

## 2021-05-19 PROCEDURE — 2500000003 HC RX 250 WO HCPCS: Performed by: INTERNAL MEDICINE

## 2021-05-19 PROCEDURE — 82962 GLUCOSE BLOOD TEST: CPT

## 2021-05-19 PROCEDURE — 2580000003 HC RX 258: Performed by: FAMILY MEDICINE

## 2021-05-19 PROCEDURE — 99223 1ST HOSP IP/OBS HIGH 75: CPT | Performed by: INTERNAL MEDICINE

## 2021-05-19 PROCEDURE — 76937 US GUIDE VASCULAR ACCESS: CPT

## 2021-05-19 PROCEDURE — 36415 COLL VENOUS BLD VENIPUNCTURE: CPT

## 2021-05-19 PROCEDURE — 80053 COMPREHEN METABOLIC PANEL: CPT

## 2021-05-19 RX ADMIN — ZOLPIDEM TARTRATE 5 MG: 5 TABLET ORAL at 23:52

## 2021-05-19 RX ADMIN — OXYCODONE HYDROCHLORIDE AND ACETAMINOPHEN 1 TABLET: 5; 325 TABLET ORAL at 21:04

## 2021-05-19 RX ADMIN — VANCOMYCIN HYDROCHLORIDE 1500 MG: 5 INJECTION, POWDER, LYOPHILIZED, FOR SOLUTION INTRAVENOUS at 11:10

## 2021-05-19 RX ADMIN — HYDROMORPHONE HYDROCHLORIDE 0.5 MG: 1 INJECTION, SOLUTION INTRAMUSCULAR; INTRAVENOUS; SUBCUTANEOUS at 09:34

## 2021-05-19 RX ADMIN — METRONIDAZOLE 500 MG: 500 INJECTION, SOLUTION INTRAVENOUS at 18:48

## 2021-05-19 RX ADMIN — SODIUM CHLORIDE, PRESERVATIVE FREE 10 ML: 5 INJECTION INTRAVENOUS at 08:21

## 2021-05-19 RX ADMIN — SODIUM CHLORIDE, PRESERVATIVE FREE 10 ML: 5 INJECTION INTRAVENOUS at 21:06

## 2021-05-19 RX ADMIN — HYDROMORPHONE HYDROCHLORIDE 0.5 MG: 1 INJECTION, SOLUTION INTRAMUSCULAR; INTRAVENOUS; SUBCUTANEOUS at 18:45

## 2021-05-19 RX ADMIN — OXYCODONE HYDROCHLORIDE AND ACETAMINOPHEN 1 TABLET: 5; 325 TABLET ORAL at 03:35

## 2021-05-19 RX ADMIN — HYDROMORPHONE HYDROCHLORIDE 0.5 MG: 1 INJECTION, SOLUTION INTRAMUSCULAR; INTRAVENOUS; SUBCUTANEOUS at 01:27

## 2021-05-19 RX ADMIN — METRONIDAZOLE 500 MG: 500 INJECTION, SOLUTION INTRAVENOUS at 06:15

## 2021-05-19 RX ADMIN — DEXTROSE MONOHYDRATE 900 MG: 50 INJECTION, SOLUTION INTRAVENOUS at 05:37

## 2021-05-19 RX ADMIN — HYDROMORPHONE HYDROCHLORIDE 0.5 MG: 1 INJECTION, SOLUTION INTRAMUSCULAR; INTRAVENOUS; SUBCUTANEOUS at 05:36

## 2021-05-19 RX ADMIN — HYDROMORPHONE HYDROCHLORIDE 0.5 MG: 1 INJECTION, SOLUTION INTRAMUSCULAR; INTRAVENOUS; SUBCUTANEOUS at 12:48

## 2021-05-19 RX ADMIN — ASPIRIN 81 MG: 81 TABLET, COATED ORAL at 08:22

## 2021-05-19 RX ADMIN — GUAIFENESIN 1200 MG: 600 TABLET, EXTENDED RELEASE ORAL at 21:04

## 2021-05-19 RX ADMIN — HYDROMORPHONE HYDROCHLORIDE 0.5 MG: 1 INJECTION, SOLUTION INTRAMUSCULAR; INTRAVENOUS; SUBCUTANEOUS at 22:40

## 2021-05-19 RX ADMIN — OXYCODONE HYDROCHLORIDE AND ACETAMINOPHEN 1 TABLET: 5; 325 TABLET ORAL at 14:59

## 2021-05-19 RX ADMIN — ENOXAPARIN SODIUM 30 MG: 30 INJECTION SUBCUTANEOUS at 08:22

## 2021-05-19 RX ADMIN — INSULIN GLARGINE 65 UNITS: 100 INJECTION, SOLUTION SUBCUTANEOUS at 21:09

## 2021-05-19 RX ADMIN — GUAIFENESIN 1200 MG: 600 TABLET, EXTENDED RELEASE ORAL at 08:22

## 2021-05-19 RX ADMIN — PIPERACILLIN SODIUM AND TAZOBACTAM SODIUM 4500 MG: 4; .5 INJECTION, POWDER, LYOPHILIZED, FOR SOLUTION INTRAVENOUS at 05:37

## 2021-05-19 RX ADMIN — OXYCODONE HYDROCHLORIDE AND ACETAMINOPHEN 1 TABLET: 5; 325 TABLET ORAL at 08:22

## 2021-05-19 ASSESSMENT — PAIN DESCRIPTION - FREQUENCY
FREQUENCY: CONTINUOUS

## 2021-05-19 ASSESSMENT — PAIN DESCRIPTION - PROGRESSION
CLINICAL_PROGRESSION: NOT CHANGED

## 2021-05-19 ASSESSMENT — PAIN DESCRIPTION - ONSET
ONSET: ON-GOING

## 2021-05-19 ASSESSMENT — PAIN DESCRIPTION - DESCRIPTORS
DESCRIPTORS: ACHING;BURNING
DESCRIPTORS: ACHING
DESCRIPTORS: ACHING;BURNING

## 2021-05-19 ASSESSMENT — PAIN SCALES - GENERAL
PAINLEVEL_OUTOF10: 7
PAINLEVEL_OUTOF10: 8
PAINLEVEL_OUTOF10: 8
PAINLEVEL_OUTOF10: 9
PAINLEVEL_OUTOF10: 8
PAINLEVEL_OUTOF10: 7
PAINLEVEL_OUTOF10: 7
PAINLEVEL_OUTOF10: 8
PAINLEVEL_OUTOF10: 7
PAINLEVEL_OUTOF10: 8

## 2021-05-19 ASSESSMENT — PAIN DESCRIPTION - PAIN TYPE
TYPE: SURGICAL PAIN

## 2021-05-19 ASSESSMENT — PAIN DESCRIPTION - LOCATION
LOCATION: GENERALIZED
LOCATION: SCROTUM;PERINEUM
LOCATION: PERINEUM;SCROTUM
LOCATION: SCROTUM

## 2021-05-19 NOTE — CARE COORDINATION
Chart reviewed. Pt seen again yesterday by psychiatry. Per APRN-CNP Chandan Robersonjuancarlos 137 Pt is stable from a psychiatric standpoint. Pt sitter and suicide precautions were discontinued. Pt discharge plan will be to return home with home care, wound vac, and possible IV ATb. ID was consulted yesterday. CM will submit for home wound vac (pending approval). LSW will continue to follow for IV ATB recommendations. F/u with Pt in the room. LSW talked to the Pt about home with home care v SNF. Pt stated that his plan is to return home with home care. LSW talked to the Pt about the concerns about possible wound vac failure ect. Pt acknowledges the concern but stated that for his mental health he will need to be home. Pt would like to continue with [facility]. LSW discussed the concerns about IV Atb. Pt stated that his spouse will be able to help with the IV ATb. Call to the [facility] to check Pt medicaid benefits. Pt is active with Medicare and traditional Medicaid as a 2ndary.      Call to [facility] with the referral.

## 2021-05-19 NOTE — PROGRESS NOTES
89.9 FL    MCH 29.0 PG    MCHC 32.2 %    RDW 13.0 %    Platelets 619 K/CU MM    MPV 9.7 FL    Myelocyte Percent 1High  %    Metamyelocytes Relative 1High  %    Bands Relative 20High  %    Segs Relative 59.0 %     He has a wound vac in place over his perineal area. Will continue IV antibiotics and work towards tight glucose control  Nephrology following for renal status. I do not think pt needs to be transferred for further psychiatric care, as recommended by the original consultant. Furthermore, the original consult did not start any antidepressant medication.    Jolie Nelson MD

## 2021-05-19 NOTE — PROGRESS NOTES
[address] [facility]  consulted by Dr. Tea Goodwin for monitoring and adjustment. Indication for treatment: SSTI, tolu's gangrene  Goal trough: 10-15 mcg/mL (staph epi)  AUC/PAMELA: <500    Pertinent Laboratory Values:   Temp Readings from Last 3 Encounters:   05/19/21 98.1 °F (36.7 °C) (Oral)   05/16/21 98.6 °F (37 °C)   05/15/21 98.6 °F (37 °C)     Recent Labs     05/17/21  0722 05/18/21  0819 05/19/21  0453   WBC 10.6* 13.3* 13.1*     Recent Labs     05/18/21  0819 05/19/21  0453   BUN 51* 41*   CREATININE 3.4* 3.0*     Estimated Creatinine Clearance: 42 mL/min (A) (based on SCr of 3 mg/dL (H)). Intake/Output Summary (Last 24 hours) at 5/19/2021 0851  Last data filed at 5/19/2021 0821  Gross per 24 hour   Intake 320.42 ml   Output 1750 ml   Net -1429.58 ml       Pertinent Cultures:  Date    Source    Results  5/13   Blood    NGTD  5/15   Urine    NGTD  5/15   Surgical   Staph epi, clostridium innocuum     Vancomycin level:   TROUGH:  No results for input(s): VANCOTROUGH in the last 72 hours.   RANDOM:    Recent Labs     05/17/21  0722 05/19/21  0453   VANCORANDOM 11.7 13.5     Assessment:  · WBC and temperature: WBC elevated; afebrile  · SCr, BUN, and urine output: CARMEN on CKD  · Day(s) of therapy: 7  · Vancomycin concentration: 13.5 (36h post-dose)    Plan:  · Continue pulse dosing based on levels 2/2 renal function changes  · Last received vancomycin 1500 mg x1 on 5/17  · Level 36h post-dose is therapeutic   · Re-dose with 1500 mg x 1 and repeat level 36h post-dose   · Renal function appears to be improving   · Pharmacy will continue to monitor patient and adjust therapy as indicated    Marleni 3 5/21 @ 0600     Thank you for the consult,  Kym Lea, [facility], PharmD, BCPS    5/19/2021 8:51 AM

## 2021-05-19 NOTE — CONSULTS
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 Status post debridement on 5/15/2021 and 2021. Culture positive for diphtheroids, Staphylococcus epidermidis, and Clostridium innocuum.  CARMEN on CKD stage III   Multi-morbidity: per PMHx  Plan:   Therapeutic: Continue vancomycin. Discontinue Zosyn and clindamycin. Start metronidazole.  Diagnostic: Trend CRP and procalcitonin   F/u test:     Thank you for allowing me to consult in the care of this patient.  ------------------------  REASON FOR CONSULT: Infective syndrome   Requested by: Dr. Thane Duane is a 55 y.o.  male type 2 diabetes mellitus, polyneuropathy, peripheral autonomic neuropathy, chronic kidney disease stage III who was admitted 2021 for further evaluation and management of 5-day history of right-sided testicular swelling and pain. He had associated fatigue and lightheadedness. He was diagnosed with Irene's gangrene of the scrotal.  He underwent surgical debridement on 5/15/2021, redo surgical debridement: 2021. He was treated empirically with vancomycin, Zosyn and clindamycin. Surgical culture was polymicrobial: Diphtheroids, Staphylococcus epidermidis and Clostridium innocuum. ? Infectious diseases service was consulted to evaluate the pt, and recommend further investigative and therapeutic measures. Review and summary of old records:  ROS: Other systems reviewed Including eyes, ENT, respiratory, cardiovascular, GI, , dermatologic, neurologic, psych, hem/lymphatic, musculoskeletal and endocrine were negative other than what is mentioned above.    Unable to obtain; pt on vent  Patient Active Problem List    Diagnosis Date Noted    Recurrent major depressive disorder, in full remission ([facility] 2021    Generalized anxiety disorder 05/18/2021    Irene gangrene     Cellulitis, scrotum 05/13/2021    Acute epididymitis     Cellulitis of left arm 04/03/2021    Cellulitis 03/23/2021    Osteomyelitis of fourth toe of right foot ([address] 08/07/2019    Acute renal failure (ARF) ([address] 08/04/2019    Diabetic foot infection ([address] 03/21/2019    Intractable nausea and vomiting 01/11/2019    Uncontrolled type 2 diabetes mellitus ([address] 01/11/2019    Nausea and vomiting 01/11/2019    Constipation 10/07/2018    Cellulitis of left foot     Sepsis ([address] 09/15/2018    WD-Wound, surgical, infected, initial encounter 92/02/2681    Periumbilical hernia     Abscess 12/03/2017    Diabetic ulcer of left midfoot ([address] 07/18/2017    WD-Diabetic ulcer of left midfoot associated with type 2 diabetes mellitus, with fat layer exposed ([address] 07/18/2017    Osteomyelitis ([address] 05/22/2015    Bronchitis 10/15/2013    Hyperlipidemia with target LDL less than 100 07/15/2013    HTN, goal below 140/90 07/15/2013    Bilateral carpal tunnel syndrome- left worse than right 06/24/2013    DANIELA (obstructive sleep apnea) 06/20/2013    Urinary frequency 06/20/2013    Neck pain 05/16/2013    Anxiety associated with depression     Panic attack 02/01/2012    Diabetic neuropathy ([address] 12/22/2011    Hiatal hernia 02/04/2011    Diabetes mellitus type 2, improved controlled 02/04/2011     Past Medical History:   Diagnosis Date    Abscess 2010    scrotal    Acute renal failure (ARF) ([address] 8/4/2019    Anxiety associated with depression     Anxiety associated with depression     Back pain 7/2/2012    Chest pain 5/1/2013    Diabetic nephropathy ([address]     Diabetic neuropathy ([address] 12/22/2011    Diabetic ulcer of left midfoot associated with type 2 diabetes mellitus, with fat layer exposed ([address] 7/18/2017    Diverticulosis     C scope + Dr. Usha Stevens DM (diabetes mellitus), type 2 ([address] 2002    DR. Turner podiatry    Hyperlipidemia LDL goal < 100 7/15/2013    Hypertension     Internal hemorrhoid     C scope + Dr. Margaret Saenz Panic attacks     Pericarditis 2003    Hospitalized with s/p heart cath normal    Peripheral autonomic neuropathy due to diabetes mellitus ([address]     Axonal EMG- N[facility], March 2011    Sebaceous cyst 9/1/2011    URI (upper respiratory infection) 2/27/2012    WD-Wound, surgical, infected, initial encounter 12/8/2017    Wrist fracture 1986      Past Surgical History:   Procedure Laterality Date   [address]  2003, 2013    Normal (Dr. Gal Carl)    COLONOSCOPY  6/2/2011    Pandiverticulosis, Nonbleeding internal hemorrhoids, Repeat colonoscopy at age 48- Dr. Aparna Fraga    \"had reconstruction surgery on nose a year after the other nose surgery\"    OTHER SURGICAL HISTORY Right 05/22/2015    I & D right great toe with partial amputation    OTHER SURGICAL HISTORY  12/04/2017    inc and drainage of abcess    WA DEEP INCIS FOOT BONE INFECTN Left 9/22/2018    LEFT FOOT DEBRIDEMENT INCISION AND DRAINAGE TOP AND BOTTOM performed by Johnathan Merlos DPM at [facility] N/A 5/15/2021    SCROTAL AND PERINEAL DEBRIDEMENT performed by Eloy Harmon MD at [address] [facility] 5/16/2021    SCROTAL RE-DEBRIDEMENT  INCISION AND DRAINAGE performed by Eloy Hamron MD at [address] [place], [place] [id number]    sebaceous cyst removal times 4     TOE AMPUTATION      right great toe    TOE AMPUTATION Right 3/23/2019    TOE AMPUTATION RIGHT 5TH TOE performed by Johnathan Merlos DPM at [facility] [address] Right 8/6/2019    TOE AMPUTATION RIGHT 4TH TOE performed by Johnathan Merlos DPM at [facility] [address] ENDOSCOPY  06/2/2011    Mild gastritis with moderatley severe antritis, small hiatal hernia, Dr. Bhavin Nicole N/A 1/12/2019    EGD BIOPSY performed by Jazmin Lopez MD at [address], [place]. \Hospital [facility] a [facility] and Outlook.\"" LAD.  Chest: no distress and CTA. Good air movement. Heart: RRR and no MRG. Abd: soft, non-distended, no tenderness, no hepatomegaly. Normoactive bowel sounds. Tenderness in the right groin  Ext: no clubbing, cyanosis, or edema  Catheter Site: without erythema or tenderness  LDA: CVC:  Neuro: Mental status intact. CN 2-12 intact and no focal sensory or motor deficits    ? Diagnostic Studies: reviewed  ? ? I have examined this patient and available medical records on this date and have made the above observations, conclusions and recommendations.   Electronically signed by: Electronically signed by Vicki Bagley MD on 5/19/2021 at 5:51 AM

## 2021-05-19 NOTE — PROGRESS NOTES
[facility] [address]   Progress Note  [facility] 0 1 2      Date: 2021   Patient: Susan San   : 1975   DOA: 2021   MRN: [id number]   ROOM#: [id number]     Admit Date: 2021     Collaborating Urologist on Call at time of admission: Dr. Rah Concepcion    CC: Scrotal Swelling   s/p Scrotal Exploration with Scrotal and Perineal Debridement with Dr. Rah Concepcion and Dr. Rosie Roque on 5/15/21  s/p repeat scrotal/perineal debridement on 21    Subjective:     Pain: mild, no nausea and no vomiting,   Bowel Movement/Flatus:   Yes  Voiding: easily     Patient notes tenderness with wound vac but overall pain is well controlled  Wound care currently at bedside changing wound vac, would edges are healthy and viable. Patient evaluated by Psych, cleared from their standpoint, therefore sitter and suicide precautions have been discontinued.      Objective:    Vitals:    BP (!) 175/95   Pulse 81   Temp 98.1 °F (36.7 °C) (Oral)   Resp 16   Ht 5' 9\" (1.753 m)   Wt 293 lb 3.2 oz (133 kg)   SpO2 97%   BMI 43.30 kg/m²    Temp  Av.2 °F (36.8 °C)  Min: 97.7 °F (36.5 °C)  Max: 98.5 °F (36.9 °C)       Intake/Output Summary (Last 24 hours) at 2021 0932  Last data filed at 2021 0629  Gross per 24 hour   Intake 320.42 ml   Output 1750 ml   Net -1429.58 ml       Physical Exam:   General appearance: alert, appears stated age, cooperative, no distress and morbidly obese  Head: Normocephalic, without obvious abnormality, atraumatic  Abdomen: protuberant abdomen, soft, non-tender, non-distended  Male genitalia: mildly buried penis, condom catheter in place, urine clear/yellow, s/p scrotal exploration/debridement, wound edges appear healthy, wound vac in place    Labs:   WBC:    Lab Results   Component Value Date    WBC 13.1 2021      Hemoglobin/Hematocrit:    Lab Results   Component Value Date    HGB 8.6 2021    HCT 27.0 2021      BMP:   Lab Results   Component Value Date     05/19/2021    K 3.9 05/19/2021     05/19/2021    CO2 23 05/19/2021    BUN 41 05/19/2021    LABALBU 2.4 05/19/2021    LABALBU 48 03/27/2021    CREATININE 3.0 05/19/2021    CALCIUM 7.5 05/19/2021    GFRAA 27 05/19/2021    LABGLOM 23 05/19/2021      PT/INR:    Lab Results   Component Value Date    PROTIME 11.8 03/22/2019    PROTIME 12.5 12/13/2010    INR 1.02 03/22/2019      PTT:    Lab Results   Component Value Date    APTT 30.8 03/22/2019        Blood Culture:  5/13/21 Negative   Urine Culture:  5/15/21 Negative   Surgical Culture: 5/15/21   DIPHTHEROIDS Light growth No further workup    Culture Abnormal  05/15/2021  4:18 PM [facility] - [address] Rare growth No further workup    Culture Abnormal  05/15/2021  4:18 PM [address] [facility] Moderate growth No further workup Sensitivities not routinely done. Drugs of choice are: Penicillin G, Metronidazole, Clindamycin or Piperacillin/Tazobactam.       Imaging:   CT PELVIS WO CONTRAST Additional Contrast? None    Result Date: 5/15/2021  EXAMINATION: CT OF THE PELVIS WITHOUT CONTRAST 5/15/2021 10:57 am TECHNIQUE: CT of the pelvis was performed without the administration of intravenous contrast.  Multiplanar reformatted images are provided for review. Dose modulation, iterative reconstruction, and/or weight based adjustment of the mA/kV was utilized to reduce the radiation dose to as low as reasonably achievable. COMPARISON: Scrotal ultrasound 05/13/2021, CT pelvis 01/10/2019.  HISTORY: ORDERING SYSTEM PROVIDED HISTORY: scrotal cellulitis, rule out perineal and scrotal abscess TECHNOLOGIST PROVIDED HISTORY: Reason for exam:->scrotal cellulitis, rule out perineal and scrotal abscess Additional Contrast?->None Reason for Exam: scrotal cellulitis, rule out perineal and scrotal abscess Acuity: Acute Type of Exam: Initial FINDINGS: There is a large amount of stranding throughout the perineum which extends into the scrotum. There is associated soft tissue gas. 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 No abscess, although there is extensive scrotal edema. The urinary bladder is nondistended. There are mildly prominent bilateral inguinal lymph nodes, measuring up to 1.4 cm, which are most likely reactive. Visualized bowel loops in the pelvis are unremarkable. The appendix is normal. No suspicious osteolytic or osteoblastic lesions are demonstrated. 1. Stranding and gas within the perineum and base of scrotum. 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 Surgical consultation is recommended. 2. Extensive scrotal edema. 3. No abscess. Results of this examination were verbally communicated to the patient's nurse, Rex Aguilar, at 1:16 p.m. on 05/15/2021 by the [address] [place] [address]. US SCROTUM AND TESTICLES    Result Date: 5/13/2021  EXAMINATION: ULTRASOUND OF THE SCROTUM/TESTICLES WITH COLOR DOPPLER FLOW EVALUATION; DOPPLER EVALUATION OF THE PELVIS 5/13/2021 COMPARISON: None. HISTORY: ORDERING SYSTEM PROVIDED HISTORY: Right-sided testicular swelling TECHNOLOGIST PROVIDED HISTORY: Reason for exam:->Right-sided testicular swelling Reason for Exam: Rt pain and swelling Acuity: Acute Type of Exam: Initial; ORDERING SYSTEM PROVIDED HISTORY: Rt swelling TECHNOLOGIST PROVIDED HISTORY: Reason for exam:->Rt swelling Reason for Exam: Rt pain and swelling Acuity: Acute Type of Exam: Initial FINDINGS: Measurements: Right testicle: 2.9 x 3.1 x 3.7 cm Left testicle: 2.5 x 3.0 x 3.8 cm Right: Grey scale: The right testicle demonstrates normal homogeneous echotexture without focal lesion. No evidence of testicular microlithiasis. Doppler Evaluation:  There is normal arterial and venous Doppler flow within the testicle. Scrotal Sac:  No evidence of hydrocele. Scrotal wall edema is noted. Epididymis:   The epididymis is enlarged and heterogeneous but does not demonstrate increased flow. Left: Grey scale: The left testicle demonstrates normal homogeneous echotexture without focal lesion. No evidence of testicular microlithiasis. Doppler Evaluation:  There is normal arterial and venous Doppler flow within the testicle. Scrotal Sac: There is a trace hydrocele. Scrotal wall edema is noted. Epididymis: The left epididymis could not be identified. 1. Normal flow in both testicles. 2. Appearance of the right epididymis suggests prior epididymitis. US DUP ABD PEL RETRO SCROT COMPLETE    Result Date: 5/13/2021  EXAMINATION: ULTRASOUND OF THE SCROTUM/TESTICLES WITH COLOR DOPPLER FLOW EVALUATION; DOPPLER EVALUATION OF THE PELVIS 5/13/2021 COMPARISON: None. HISTORY: ORDERING SYSTEM PROVIDED HISTORY: Right-sided testicular swelling TECHNOLOGIST PROVIDED HISTORY: Reason for exam:->Right-sided testicular swelling Reason for Exam: Rt pain and swelling Acuity: Acute Type of Exam: Initial; ORDERING SYSTEM PROVIDED HISTORY: Rt swelling TECHNOLOGIST PROVIDED HISTORY: Reason for exam:->Rt swelling Reason for Exam: Rt pain and swelling Acuity: Acute Type of Exam: Initial FINDINGS: Measurements: Right testicle: 2.9 x 3.1 x 3.7 cm Left testicle: 2.5 x 3.0 x 3.8 cm Right: Grey scale: The right testicle demonstrates normal homogeneous echotexture without focal lesion. No evidence of testicular microlithiasis. Doppler Evaluation:  There is normal arterial and venous Doppler flow within the testicle. Scrotal Sac:  No evidence of hydrocele. Scrotal wall edema is noted. Epididymis: The epididymis is enlarged and heterogeneous but does not demonstrate increased flow. Left: Grey scale: The left testicle demonstrates normal homogeneous echotexture without focal lesion. No evidence of testicular microlithiasis. Doppler Evaluation:  There is normal arterial and venous Doppler flow within the testicle. Scrotal Sac: There is a trace hydrocele. Scrotal wall edema is noted.  Epididymis: The left epididymis could not be identified. 1. Normal flow in both testicles. 2. Appearance of the right epididymis suggests prior epididymitis. Assessment & Plan:      Etienne Suarez is a 55y.o. year old male admitted 5/13/2021 for scrotal cellulitis. 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 Leah Murray and Dr. Goran Mendoza   POD #3 repeat scrotal/perineal debridement   Urine culture Negative; Blood Cultures 5/13/21 Negative x2   Surgical Culture 5/15/21 +Diphtheroids, Staph epidermidis and Clostridium innocuum   On IV Vanco and Flagyl, Infectious Disease now following    WBC 13.1, Afebrile   Continue Wound Vac, managed by Wound Care, plans for exchange on MWF   No surgical intervention/repeat debridement recommended at this time, wound edges appear healthy    Will continue to follow closely    Patient will require home health for wound vac care upon discharge     Patient seen and examined, chart reviewed.      Electronically signed by Martina Almanza PA-C on 5/19/2021 at 9:32 AM

## 2021-05-19 NOTE — CONSULTS
[address] [address] Continence Nurse  Consult Note       Alondra Soto  AGE: 55 y.o. GENDER: male  : 1975  TODAY'S DATE:  2021    Subjective:     Reason for  Evaluation and Assessment: wound care for NPWT dressing change to scrotum/buttocks/perineum. Alondra Soto is a 55 y.o. male referred by:   [x] Physician  [] Nursing  [] Other:     Wound Identification:  Wound Type: diabetic, non-healing surgical and tolu gangrene  Contributing Factors: diabetes        PAST MEDICAL HISTORY        Diagnosis Date    Abscess     scrotal    Acute renal failure (ARF) ([address] 2019    Anxiety associated with depression     Anxiety associated with depression     Back pain 2012    Chest pain 2013    Diabetic nephropathy ([address]     Diabetic neuropathy ([address] 2011    Diabetic ulcer of left midfoot associated with type 2 diabetes mellitus, with fat layer exposed ([address] 2017    Diverticulosis     C scope + Dr. Washington Torres DM (diabetes mellitus), type 2 ([address]     DR. Turner podiatry    Hyperlipidemia LDL goal < 100 7/15/2013    Hypertension     Internal hemorrhoid     C scope + Dr. Godfrey Marine fracture 1988    Panic attacks     Pericarditis     Hospitalized with s/p heart cath normal    Peripheral autonomic neuropathy due to diabetes mellitus ([address]     Axonal EMG- [facility], 2011    Sebaceous cyst 2011    URI (upper respiratory infection) 2012    WD-Wound, surgical, infected, initial encounter 2017    Wrist fracture        PAST SURGICAL HISTORY    Past Surgical History:   Procedure Laterality Date   [address] 2013    Normal (Dr. Lisa Weber)    COLONOSCOPY  2011    Pandiverticulosis, Nonbleeding internal hemorrhoids, Repeat colonoscopy at age 48- Dr. Wilfredo Hopson    \"had reconstruction surgery on nose a year after the other nose surgery\"    OTHER SURGICAL HISTORY Right 2015    I & D right great toe with partial amputation    OTHER SURGICAL HISTORY  12/04/2017    inc and drainage of abcess    NE DEEP INCIS FOOT BONE INFECTN Left 9/22/2018    LEFT FOOT DEBRIDEMENT INCISION AND DRAINAGE TOP AND BOTTOM performed by Tika Matthews DPM at [facility] N/A 5/15/2021    SCROTAL AND PERINEAL DEBRIDEMENT performed by Maris Méndez MD at [facility] N/A 5/16/2021    SCROTAL RE-DEBRIDEMENT  INCISION AND DRAINAGE performed by Maris Méndez MD at [address] [place] [address] [id number]    sebaceous cyst removal times 4     TOE AMPUTATION      right great toe    TOE AMPUTATION Right 3/23/2019    TOE AMPUTATION RIGHT 5TH TOE performed by Tika Matthews DPM at [place] [address] TOE AMPUTATION Right 8/6/2019    TOE AMPUTATION RIGHT 4TH TOE performed by Tika Matthews DPM at [address] [place] [address] UPPER GASTROINTESTINAL ENDOSCOPY  06/2/2011    Mild gastritis with moderatley severe antritis, small hiatal hernia, Dr. Alecia Donahue N/A 1/12/2019    EGD BIOPSY performed by Clarisa Patel MD at [facility]    Family History   Problem Relation Age of Onset    Cancer Mother         ?site   Cholo Riddles Stroke Mother     Bleeding Prob Mother     Diabetes Father     Heart Disease Father     High Blood Pressure Father     Obesity Father     Kidney Disease Father     Diabetes Sister     High Blood Pressure Sister     Mental Illness Sister     Obesity Sister     High Blood Pressure Other     Mental Illness Other         bipolar    Other Son         cyst in ear canal    ADHD Daughter        SOCIAL HISTORY    Social History     Tobacco Use    Smoking status: Former Smoker     Years: 20.00     Quit date: 11/18/2017     Years since quitting: 3.5    Smokeless tobacco: Never Used   Vaping Use    Vaping Use: Never used   Substance Use Topics    Alcohol use: Yes     Comment: occ    Drug use:  Yes Frequency: 7.0 times per week     Types: Marijuana       ALLERGIES    Allergies   Allergen Reactions    Adhesive Tape Rash    Doxycycline Nausea And Vomiting    Reglan [Metoclopramide] Anxiety       MEDICATIONS    No current facility-administered medications on file prior to encounter. Current Outpatient Medications on File Prior to Encounter   Medication Sig Dispense Refill    aspirin 81 MG EC tablet Take 1 tablet by mouth daily 30 tablet 3    insulin glargine (LANTUS) 100 UNIT/ML injection vial Inject 40 Units into the skin nightly 1 vial 5    insulin lispro (HUMALOG) 100 UNIT/ML injection vial Inject 25 Units into the skin 3 times daily (with meals) 1 vial 5    linagliptin (TRADJENTA) 5 MG tablet Take 1 tablet by mouth daily 30 tablet 5    ondansetron (ZOFRAN) 4 MG tablet Take 1 tablet by mouth every 8 hours as needed for Nausea or Vomiting 20 tablet 0    Lancets MISC 1 each by Does not apply route daily 100 each 3    glucose monitoring kit (FREESTYLE) monitoring kit 1 each by Does not apply route once for 1 dose.  1 kit 0         Objective:      BP (!) 175/95   Pulse 81   Temp 98.1 °F (36.7 °C) (Oral)   Resp 16   Ht 5' 9\" (1.753 m)   Wt 293 lb 3.2 oz (133 kg)   SpO2 97%   BMI 43.30 kg/m²   Pradeep Risk Score: Pradeep Scale Score: 16    LABS    CBC:   Lab Results   Component Value Date    WBC 13.1 05/19/2021    RBC 2.95 05/19/2021    HGB 8.6 05/19/2021    HCT 27.0 05/19/2021    MCV 91.5 05/19/2021    MCH 29.2 05/19/2021    MCHC 31.9 05/19/2021    RDW 13.0 05/19/2021     05/19/2021    MPV 9.5 05/19/2021     CMP:    Lab Results   Component Value Date     05/19/2021    K 3.9 05/19/2021     05/19/2021    CO2 23 05/19/2021    BUN 41 05/19/2021    CREATININE 3.0 05/19/2021    GFRAA 27 05/19/2021    LABGLOM 23 05/19/2021    GLUCOSE 189 05/19/2021    PROT 5.8 05/19/2021    PROT 7.1 04/11/2012    LABALBU 2.4 05/19/2021    LABALBU 48 03/27/2021    CALCIUM 7.5 05/19/2021    BILITOT 0.3 05/19/2021    ALKPHOS 105 05/19/2021    AST 13 05/19/2021    ALT 10 05/19/2021     Albumin:    Lab Results   Component Value Date    LABALBU 2.4 05/19/2021    LABALBU 48 03/27/2021     PT/INR:    Lab Results   Component Value Date    PROTIME 11.8 03/22/2019    PROTIME 12.5 12/13/2010    INR 1.02 03/22/2019     HgBA1c:    Lab Results   Component Value Date    LABA1C 9.7 05/13/2021         Assessment:     Patient Active Problem List   Diagnosis    Hiatal hernia    Diabetes mellitus type 2, improved controlled    Diabetic neuropathy ([address]    Panic attack    Anxiety associated with depression    Neck pain    DANIELA (obstructive sleep apnea)    Urinary frequency    Bilateral carpal tunnel syndrome- left worse than right    Hyperlipidemia with target LDL less than 100    HTN, goal below 140/90    Bronchitis    Osteomyelitis ([address]    Diabetic ulcer of left midfoot ([address]    WD-Diabetic ulcer of left midfoot associated with type 2 diabetes mellitus, with fat layer exposed ([address]    Abscess    Periumbilical hernia    WD-Wound, surgical, infected, initial encounter    Sepsis ([address]    Cellulitis of left foot    Constipation    Intractable nausea and vomiting    Uncontrolled type 2 diabetes mellitus (HCC)    Nausea and vomiting    Diabetic foot infection ([address]    Acute renal failure (ARF) ([address]    Osteomyelitis of fourth toe of right foot ([address]    Cellulitis    Cellulitis of left arm    Cellulitis, scrotum    Acute epididymitis    Irene gangrene    Recurrent major depressive disorder, in full remission ([address]    Generalized anxiety disorder       Measurements:  Negative Pressure Wound Therapy Groin (Active)   Wound Type Surgical 05/19/21 0900   Unit Type kci 05/19/21 0900   Dressing Type Black foam;White foam 05/19/21 0900   Number of pieces used 4 05/18/21 2114   Cycle Continuous 05/19/21 0900   Target Pressure (mmHg) 125 05/18/21 2114   Canister changed?  No 05/19/21 0900   Dressing Status Changed 05/19/21 0900   Dressing Changed Changed/New 05/19/21 0900   Drainage Amount Moderate 05/19/21 0900   Drainage Description Serosanguinous 05/19/21 0900   Dressing Change Due 05/21/21 05/19/21 0900   Wound Assessment Red;Pink;Yellow 05/18/21 2114   Shazia-wound Assessment Intact 05/18/21 2114   Shape irregular 05/17/21 0930   Odor None 05/18/21 2114   Number of days: 2       Wound 07/18/17 Other (Comment) WOUND #2 LEFT PLANTAR (ONSET 3 MTHS) (Active)   Number of days: 1401       Wound 12/03/17 Other (Comment) Foot Left (Active)   Number of days: 3409       Wound 12/08/17 #3 abdoment (onset 3 days ago) SURGICAL (Active)   Number of days: 7327       Wound 12/08/17 #4 Lt plantar (onset 6 months ago) DIABETIC ALCALA 1 (Active)   Number of days: 0422       Wound 05/18/18 # 5 LEFT PLANTAR (ONSET 1 YEAR AGO) DM WAG 1 (Active)   Number of days: 1096       Wound 09/17/18 Left dorsal foot and 4th toe amp site 9/19/18 (Active)   Number of days: 974       Incision 05/22/15 Foot Right (Active)   Number of days: 2188       Incision 12/04/17 Abdomen Mid (Active)   Number of days: 0052       Incision 09/22/18 Foot Left (Active)   Number of days: 969       Wound 01/11/19 left plantar foot wound (Active)   Number of days: 859       Wound 03/21/19 Toe (Comment  which one) Anterior;Right (Active)   Number of days: 790       Wound 03/21/19 Foot Left;Posterior hard callus area (Active)   Number of days: 790       Wound 03/24/21 Left;Plantar (Active)   Number of days: 55       Wound 05/13/21 Left;Plantar (Active)   Wound Etiology Diabetic 05/19/21 0900   Dressing Status New dressing applied 05/19/21 0900   Wound Cleansed Cleansed with saline 05/19/21 0900   Dressing/Treatment Collagen 05/19/21 0900   Wound Length (cm) 0.3 cm 05/13/21 1611   Wound Width (cm) 0.5 cm 05/13/21 1611   Wound Depth (cm) 0.5 cm 05/13/21 1611   Wound Surface Area (cm^2) 0.15 cm^2 05/13/21 1611   Wound Volume (cm^3) 0.08 cm^3 05/13/21 1611   Distance Tunneling (cm) 0 cm 05/19/21 0900   Tunneling Position ___ O'Clock 0 05/19/21 0900   Undermining Starts ___ O'Clock 0 05/19/21 0900   Undermining Ends___ O'Clock 0 05/19/21 0900   Undermining Maxium Distance (cm) 0 05/19/21 0900   Wound Assessment Dry 05/18/21 0930   Drainage Amount Small 05/19/21 0900   Drainage Description Serosanguinous 05/19/21 0900   Odor None 05/19/21 0900   Shazia-wound Assessment Intact 05/19/21 0900   Margins Defined edges 05/19/21 0900   Wound Thickness Description not for Pressure Injury Full thickness 05/19/21 0900   Number of days: 5       Wound 05/17/21 Groin Right scrotum extends to lower buttock (Active)   Wound Etiology Non-Healing Surgical 05/19/21 0900   Dressing Status New dressing applied 05/19/21 0900   Wound Cleansed Cleansed with saline 05/19/21 0900   Dressing/Treatment Negative pressure wound therapy 05/19/21 0900   Wound Length (cm) 27 cm 05/17/21 0930   Wound Width (cm) 12.5 cm 05/17/21 0930   Wound Depth (cm) 4 cm 05/17/21 0930   Wound Surface Area (cm^2) 337.5 cm^2 05/17/21 0930   Wound Volume (cm^3) 1350 cm^3 05/17/21 0930   Distance Tunneling (cm) 0 cm 05/18/21 0930   Tunneling Position ___ O'Clock 0 05/18/21 0930   Undermining Starts ___ O'Clock 0 05/18/21 0930   Undermining Ends___ O'Clock 0 05/18/21 0930   Undermining Maxium Distance (cm) 0 05/18/21 0930   Wound Assessment Pink/red;Subcutaneous 05/18/21 0930   Drainage Amount Moderate 05/19/21 0900   Drainage Description Serosanguinous 05/19/21 0900   Odor None 05/19/21 0900   Shazia-wound Assessment Intact 05/19/21 0900   Margins Defined edges 05/19/21 0900   Wound Thickness Description not for Pressure Injury Full thickness 05/19/21 0900   Number of days: 2       Response to treatment:  With complaints of pain.      Pain Assessment:  Severity:  moderate  Quality of pain: sore  Wound Pain Timing/Severity: dressing change  Premedicated: yes    Plan:     Plan of Care: Wound 05/13/21 Left;Plantar-Dressing/Treatment: Collagen (opt border)  Wound 05/17/21 Groin Right scrotum extends to lower buttock-Dressing/Treatment: Negative pressure wound therapy (mastisol white foam x 1 black foam x 3 drape 125mmhg cont )     Patient in bed agreeable to wound care for vac dressing change to scrotum/buttocks/perineum. Wound vac was alarming when wound care came into the room. Pt said the nurses had to patch the area x 2. Dressing removed soaked with NS and cleansed wound with NS. Pt has pain with dressing change. Applied new wound vac dressing with white foam x 1 and black x 3 pieces. Adequate seal obtained @ 125mmhg continuous. Discussed with patient and case management about the vac being managed at home. Will continue wound vac if issues arise with unable to obtain a seal then dressings should be switched to daily dressings with wet to dry. Pt is generally not at risk for skin breakdown AEB nicole. Wound care to change vac dressing again on Friday. Specialty Bed Required :  no  [] Low Air Loss   [] Pressure Redistribution  [] Fluid Immersion  [] Bariatric  [] Total Pressure Relief  [] Other:     Discharge Plan:  Placement for patient upon discharge: home  Hospice Care: no  Patient appropriate for Outpatient [address]: pt may be following with dr Constance Rodriguez    Patient/Caregiver Teaching:  Level of patient/caregiver understanding able to:  Cares explained as given. Electronically signed by Cassandra Massey.  ROBIN Salvador,  on 5/19/2021 at 12:21 PM

## 2021-05-19 NOTE — PROGRESS NOTES
General Surgery-Dr. 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 Pain mostly controlled. Tolerating PO. Denies F/C.      ROS:  Review of Systems   Constitutional: Negative for chills and fever. Skin: Positive for wound. All other systems reviewed and are negative. Allergies  Adhesive tape, Doxycycline, and Reglan [metoclopramide]          Diagnosis Date    Abscess     scrotal    Acute renal failure (ARF) ([address] 2019    Anxiety associated with depression     Anxiety associated with depression     Back pain 2012    Chest pain 2013    Diabetic nephropathy ([address]     Diabetic neuropathy ([address] 2011    Diabetic ulcer of left midfoot associated with type 2 diabetes mellitus, with fat layer exposed ([address] 2017    Diverticulosis     C scope + Dr. Kimberlee Johnson DM (diabetes mellitus), type 2 ([address]     DR. Turner podiatry    Hyperlipidemia LDL goal < 100 7/15/2013    Hypertension     Internal hemorrhoid     C scope + Dr. Martinez Cradle fracture 1988    Panic attacks     Pericarditis 2003    Hospitalized with s/p heart cath normal    Peripheral autonomic neuropathy due to diabetes mellitus ([address]     Axonal EMG- NCS, 2011    Sebaceous cyst 2011    URI (upper respiratory infection) 2012    WD-Wound, surgical, infected, initial encounter 2017    Wrist fracture        Objective:     Vitals:    21 0329   BP: (!) 157/76   Pulse: 82   Resp: 16   Temp: 97.7 °F (36.5 °C)   SpO2: 96%       TEMPERATURE:  Current -Temp: 97.7 °F (36.5 °C); Max - Temp  Av.2 °F (36.8 °C)  Min: 97.7 °F (36.5 °C)  Max: 98.5 °F (36.9 °C)    No intake/output data recorded. I/O last 3 completed shifts: In: 310.4 [I.V.:40; IV Piggyback:270.4]  Out: 1750 [Urine:1750]      Physical Exam:  Physical Exam  Vitals reviewed.    Constitutional:       General: He is not in acute distress. Appearance: He is obese. He is not ill-appearing, toxic-appearing or diaphoretic. HENT:      Head: Normocephalic and atraumatic. Right Ear: External ear normal.      Left Ear: External ear normal.      Nose: Nose normal.   Eyes:      General:         Right eye: No discharge. Left eye: No discharge. Extraocular Movements: Extraocular movements intact. Cardiovascular:      Rate and Rhythm: Normal rate. Pulmonary:      Effort: No respiratory distress. Abdominal:      Palpations: Abdomen is soft. Tenderness: There is no abdominal tenderness. Genitourinary:     Comments:     +wound vac in place with good seal.     Appropriately tender  Musculoskeletal:      Cervical back: Normal range of motion. Skin:     General: Skin is warm. Neurological:      General: No focal deficit present. Mental Status: He is alert.    Psychiatric:         Mood and Affect: Mood normal.           Scheduled Meds:   metroNIDAZOLE  500 mg Intravenous Q8H    insulin lispro  0-12 Units Subcutaneous TID WC    insulin lispro  0-6 Units Subcutaneous Nightly    insulin glargine  65 Units Subcutaneous Nightly    insulin lispro  20 Units Subcutaneous Lunch    insulin lispro  15 Units Subcutaneous Daily with breakfast    insulin lispro  30 Units Subcutaneous Dinner    aspirin  81 mg Oral Daily    sodium chloride flush  5-40 mL Intravenous 2 times per day    enoxaparin  30 mg Subcutaneous Daily    guaiFENesin  1,200 mg Oral BID    vancomycin (VANCOCIN) intermittent dosing (placeholder)   Other See Admin Instructions     ContinuousInfusions:   dextrose      sodium chloride       PRN Meds:haloperidol lactate, HYDROmorphone **OR** HYDROmorphone, ondansetron, glucose, dextrose, glucagon (rDNA), dextrose, sodium chloride flush, sodium chloride, potassium chloride **OR** potassium alternative oral replacement **OR** potassium chloride, magnesium sulfate, promethazine **OR** ondansetron,

## 2021-05-20 LAB
ALBUMIN SERPL-MCNC: 2.2 GM/DL (ref 3.4–5)
ALP BLD-CCNC: 85 IU/L (ref 40–128)
ALT SERPL-CCNC: 8 U/L (ref 10–40)
ANION GAP SERPL CALCULATED.3IONS-SCNC: 9 MMOL/L (ref 4–16)
AST SERPL-CCNC: 10 IU/L (ref 15–37)
BILIRUB SERPL-MCNC: 0.2 MG/DL (ref 0–1)
BUN BLDV-MCNC: 31 MG/DL (ref 6–23)
CALCIUM SERPL-MCNC: 7.6 MG/DL (ref 8.3–10.6)
CHLORIDE BLD-SCNC: 104 MMOL/L (ref 99–110)
CO2: 21 MMOL/L (ref 21–32)
CREAT SERPL-MCNC: 2.3 MG/DL (ref 0.9–1.3)
GFR AFRICAN AMERICAN: 37 ML/MIN/1.73M2
GFR NON-AFRICAN AMERICAN: 31 ML/MIN/1.73M2
GLUCOSE BLD-MCNC: 163 MG/DL (ref 70–99)
GLUCOSE BLD-MCNC: 167 MG/DL (ref 70–99)
GLUCOSE BLD-MCNC: 178 MG/DL (ref 70–99)
GLUCOSE BLD-MCNC: 204 MG/DL (ref 70–99)
GLUCOSE BLD-MCNC: 227 MG/DL (ref 70–99)
HCT VFR BLD CALC: 24.9 % (ref 42–52)
HEMOGLOBIN: 8.1 GM/DL (ref 13.5–18)
HIGH SENSITIVE C-REACTIVE PROTEIN: 69.7 MG/L
MAGNESIUM: 2.5 MG/DL (ref 1.8–2.4)
MCH RBC QN AUTO: 29.5 PG (ref 27–31)
MCHC RBC AUTO-ENTMCNC: 32.5 % (ref 32–36)
MCV RBC AUTO: 90.5 FL (ref 78–100)
PDW BLD-RTO: 13 % (ref 11.7–14.9)
PHOSPHORUS: 4.2 MG/DL (ref 2.5–4.9)
PLATELET # BLD: 370 K/CU MM (ref 140–440)
PMV BLD AUTO: 9.3 FL (ref 7.5–11.1)
POTASSIUM SERPL-SCNC: 4 MMOL/L (ref 3.5–5.1)
PROCALCITONIN: 0.59
RBC # BLD: 2.75 M/CU MM (ref 4.6–6.2)
SODIUM BLD-SCNC: 134 MMOL/L (ref 135–145)
TOTAL PROTEIN: 5.7 GM/DL (ref 6.4–8.2)
WBC # BLD: 12.2 K/CU MM (ref 4–10.5)

## 2021-05-20 PROCEDURE — 80053 COMPREHEN METABOLIC PANEL: CPT

## 2021-05-20 PROCEDURE — 6370000000 HC RX 637 (ALT 250 FOR IP): Performed by: SURGERY

## 2021-05-20 PROCEDURE — 94761 N-INVAS EAR/PLS OXIMETRY MLT: CPT

## 2021-05-20 PROCEDURE — 6370000000 HC RX 637 (ALT 250 FOR IP): Performed by: PHYSICIAN ASSISTANT

## 2021-05-20 PROCEDURE — 84145 PROCALCITONIN (PCT): CPT

## 2021-05-20 PROCEDURE — 2500000003 HC RX 250 WO HCPCS: Performed by: INTERNAL MEDICINE

## 2021-05-20 PROCEDURE — 6370000000 HC RX 637 (ALT 250 FOR IP): Performed by: INTERNAL MEDICINE

## 2021-05-20 PROCEDURE — 84100 ASSAY OF PHOSPHORUS: CPT

## 2021-05-20 PROCEDURE — 6360000002 HC RX W HCPCS: Performed by: SURGERY

## 2021-05-20 PROCEDURE — 85027 COMPLETE CBC AUTOMATED: CPT

## 2021-05-20 PROCEDURE — 2580000003 HC RX 258: Performed by: SURGERY

## 2021-05-20 PROCEDURE — 83735 ASSAY OF MAGNESIUM: CPT

## 2021-05-20 PROCEDURE — 99233 SBSQ HOSP IP/OBS HIGH 50: CPT | Performed by: INTERNAL MEDICINE

## 2021-05-20 PROCEDURE — 1200000000 HC SEMI PRIVATE

## 2021-05-20 PROCEDURE — 36415 COLL VENOUS BLD VENIPUNCTURE: CPT

## 2021-05-20 PROCEDURE — 82962 GLUCOSE BLOOD TEST: CPT

## 2021-05-20 PROCEDURE — 86141 C-REACTIVE PROTEIN HS: CPT

## 2021-05-20 RX ORDER — AMLODIPINE BESYLATE 5 MG/1
5 TABLET ORAL DAILY
Status: DISCONTINUED | OUTPATIENT
Start: 2021-05-20 | End: 2021-05-20

## 2021-05-20 RX ORDER — LISINOPRIL 40 MG/1
40 TABLET ORAL DAILY
Status: DISCONTINUED | OUTPATIENT
Start: 2021-05-20 | End: 2021-05-21 | Stop reason: HOSPADM

## 2021-05-20 RX ORDER — TAMSULOSIN HYDROCHLORIDE 0.4 MG/1
0.4 CAPSULE ORAL DAILY
Status: DISCONTINUED | OUTPATIENT
Start: 2021-05-20 | End: 2021-05-21 | Stop reason: HOSPADM

## 2021-05-20 RX ADMIN — HYDROMORPHONE HYDROCHLORIDE 0.5 MG: 1 INJECTION, SOLUTION INTRAMUSCULAR; INTRAVENOUS; SUBCUTANEOUS at 06:08

## 2021-05-20 RX ADMIN — HYDROMORPHONE HYDROCHLORIDE 0.5 MG: 1 INJECTION, SOLUTION INTRAMUSCULAR; INTRAVENOUS; SUBCUTANEOUS at 18:04

## 2021-05-20 RX ADMIN — SODIUM CHLORIDE, PRESERVATIVE FREE 10 ML: 5 INJECTION INTRAVENOUS at 21:40

## 2021-05-20 RX ADMIN — ZOLPIDEM TARTRATE 5 MG: 5 TABLET ORAL at 22:22

## 2021-05-20 RX ADMIN — ENOXAPARIN SODIUM 30 MG: 30 INJECTION SUBCUTANEOUS at 08:47

## 2021-05-20 RX ADMIN — LISINOPRIL 40 MG: 40 TABLET ORAL at 10:10

## 2021-05-20 RX ADMIN — OXYCODONE HYDROCHLORIDE AND ACETAMINOPHEN 1 TABLET: 5; 325 TABLET ORAL at 05:01

## 2021-05-20 RX ADMIN — METRONIDAZOLE 500 MG: 500 INJECTION, SOLUTION INTRAVENOUS at 08:47

## 2021-05-20 RX ADMIN — TAMSULOSIN HYDROCHLORIDE 0.4 MG: 0.4 CAPSULE ORAL at 18:04

## 2021-05-20 RX ADMIN — SODIUM CHLORIDE, PRESERVATIVE FREE 10 ML: 5 INJECTION INTRAVENOUS at 08:48

## 2021-05-20 RX ADMIN — OXYCODONE HYDROCHLORIDE AND ACETAMINOPHEN 1 TABLET: 5; 325 TABLET ORAL at 10:10

## 2021-05-20 RX ADMIN — OXYCODONE HYDROCHLORIDE AND ACETAMINOPHEN 1 TABLET: 5; 325 TABLET ORAL at 01:06

## 2021-05-20 RX ADMIN — HYDROMORPHONE HYDROCHLORIDE 0.5 MG: 1 INJECTION, SOLUTION INTRAMUSCULAR; INTRAVENOUS; SUBCUTANEOUS at 13:04

## 2021-05-20 RX ADMIN — METRONIDAZOLE 500 MG: 500 INJECTION, SOLUTION INTRAVENOUS at 01:49

## 2021-05-20 RX ADMIN — ASPIRIN 81 MG: 81 TABLET, COATED ORAL at 08:47

## 2021-05-20 RX ADMIN — HYDROMORPHONE HYDROCHLORIDE 0.5 MG: 1 INJECTION, SOLUTION INTRAMUSCULAR; INTRAVENOUS; SUBCUTANEOUS at 01:47

## 2021-05-20 RX ADMIN — PROMETHAZINE HYDROCHLORIDE 12.5 MG: 25 TABLET ORAL at 10:10

## 2021-05-20 RX ADMIN — METRONIDAZOLE 500 MG: 500 INJECTION, SOLUTION INTRAVENOUS at 18:04

## 2021-05-20 RX ADMIN — INSULIN GLARGINE 65 UNITS: 100 INJECTION, SOLUTION SUBCUTANEOUS at 21:43

## 2021-05-20 RX ADMIN — HYDROMORPHONE HYDROCHLORIDE 0.5 MG: 1 INJECTION, SOLUTION INTRAMUSCULAR; INTRAVENOUS; SUBCUTANEOUS at 21:41

## 2021-05-20 ASSESSMENT — PAIN DESCRIPTION - LOCATION
LOCATION: PERINEUM;SCROTUM

## 2021-05-20 ASSESSMENT — PAIN SCALES - GENERAL
PAINLEVEL_OUTOF10: 7
PAINLEVEL_OUTOF10: 8
PAINLEVEL_OUTOF10: 7
PAINLEVEL_OUTOF10: 7
PAINLEVEL_OUTOF10: 5
PAINLEVEL_OUTOF10: 8
PAINLEVEL_OUTOF10: 5
PAINLEVEL_OUTOF10: 8

## 2021-05-20 ASSESSMENT — PAIN DESCRIPTION - PROGRESSION
CLINICAL_PROGRESSION: NOT CHANGED

## 2021-05-20 ASSESSMENT — PAIN DESCRIPTION - PAIN TYPE
TYPE: SURGICAL PAIN

## 2021-05-20 ASSESSMENT — PAIN DESCRIPTION - DESCRIPTORS
DESCRIPTORS: CONSTANT;BURNING;STABBING
DESCRIPTORS: ACHING

## 2021-05-20 ASSESSMENT — PAIN DESCRIPTION - FREQUENCY: FREQUENCY: CONTINUOUS

## 2021-05-20 ASSESSMENT — PAIN DESCRIPTION - ONSET: ONSET: ON-GOING

## 2021-05-20 NOTE — PROGRESS NOTES
K/CU MM    MPV 9.3 7.5 - 11.1 FL   C-Reactive Protein    Collection Time: 05/20/21  6:52 AM   Result Value Ref Range    CRP, High Sensitivity 69.7 mg/L   Comprehensive Metabolic Panel    Collection Time: 05/20/21  6:52 AM   Result Value Ref Range    Sodium 134 (L) 135 - 145 MMOL/L    Potassium 4.0 3.5 - 5.1 MMOL/L    Chloride 104 99 - 110 mMol/L    CO2 21 21 - 32 MMOL/L    BUN 31 (H) 6 - 23 MG/DL    CREATININE 2.3 (H) 0.9 - 1.3 MG/DL    Glucose 163 (H) 70 - 99 MG/DL    Calcium 7.6 (L) 8.3 - 10.6 MG/DL    Albumin 2.2 (L) 3.4 - 5.0 GM/DL    Total Protein 5.7 (L) 6.4 - 8.2 GM/DL    Total Bilirubin 0.2 0.0 - 1.0 MG/DL    ALT 8 (L) 10 - 40 U/L    AST 10 (L) 15 - 37 IU/L    Alkaline Phosphatase 85 40 - 128 IU/L    GFR Non- 31 (L) >60 mL/min/1.73m2    GFR  37 (L) >60 mL/min/1.73m2    Anion Gap 9 4 - 16   Phosphorus    Collection Time: 05/20/21  6:52 AM   Result Value Ref Range    Phosphorus 4.2 2.5 - 4.9 MG/DL   Magnesium    Collection Time: 05/20/21  6:52 AM   Result Value Ref Range    Magnesium 2.5 (H) 1.8 - 2.4 mg/dl     CULTURE results: Invalid input(s): BLOOD CULTURE,  URINE CULTURE, SURGICAL CULTURE    Diagnosis:  Patient Active Problem List   Diagnosis    Hiatal hernia    Diabetes mellitus type 2, improved controlled    Diabetic neuropathy (HCC)    Panic attack    Anxiety associated with depression    Neck pain    DANIELA (obstructive sleep apnea)    Urinary frequency    Bilateral carpal tunnel syndrome- left worse than right    Hyperlipidemia with target LDL less than 100    HTN, goal below 140/90    Bronchitis    Osteomyelitis (HCC)    Diabetic ulcer of left midfoot (HCC)    WD-Diabetic ulcer of left midfoot associated with type 2 diabetes mellitus, with fat layer exposed ([address]    Abscess    Periumbilical hernia    WD-Wound, surgical, infected, initial encounter    Sepsis ([address]    Cellulitis of left foot    Constipation    Intractable nausea and vomiting    Uncontrolled type 2 diabetes mellitus (HCC)    Nausea and vomiting    Diabetic foot infection (HCC)    Acute renal failure (ARF) (HCC)    Osteomyelitis of fourth toe of right foot (HCC)    Cellulitis    Cellulitis of left arm    Cellulitis, scrotum    Acute epididymitis    Irene gangrene    Recurrent major depressive disorder, in full remission [facility]    Generalized anxiety disorder       Active Problems  Principal Problem:    Irene gangrene  Active Problems:    Recurrent major depressive disorder, in full remission ([facility]    Generalized anxiety disorder    Cellulitis, scrotum    Acute epididymitis  Resolved Problems:    * No resolved hospital problems. *      Impression and plan   Summary and rationale: Patient is a 55 y.o.  male with a medical history of poorly controlled diabetes mellitus admitted for Irene's gangrene of the scrotum, status post debridement on 5/15/2021 and 5/16/2021. Culture positive for diphtheroids, Staphylococcus epidermidis and Clostridium innocuum.  Clinical status: Some improvement as he is afebrile and CRP is down from yesterday's value.  Therapeutic: Continue vancomycin and metronidazole.   Plan to discharge with dalbavancin 1500 mg IV once and metronidazole 500 mg 3 times daily for 2 weeks   Diagnostic: Trend CRP   F/u:   Other: Optimize blood glucose control        Electronically signed by: Electronically signed by Altha Habermann, MD on 5/20/2021 at 8:57 AM

## 2021-05-20 NOTE — CARE COORDINATION
Wound vac approved from [facility]. ID unsure of d/c recommendations, wants to repeat lab work tomorrow.

## 2021-05-20 NOTE — PLAN OF CARE
Nutrition Problem #1: Altered nutrition-related lab values  Intervention: Food and/or Nutrient Delivery: Modify Current Diet, Start Oral Nutrition Supplement  Nutritional Goals: pt will consume greater than 75% of meals and supplements

## 2021-05-20 NOTE — PROGRESS NOTES
5/19/2021    Better glucose control today. He is feeling a little better, less pain at the surgical site. Afebrile, VSS. Heart regular, abd soft lungs clear, trace leg edema. Wound vac working well at perineum, with reduced redness and swelling. Sodium 134Low  MMOL/L    Potassium 3.9 MMOL/L    Chloride 102 mMol/L    CO2 23 MMOL/L    BUN 41High  MG/DL    CREATININE 3.0High  MG/DL    Glucose 189High  MG/DL    Calcium 7.5Low  MG/DL    Albumin 2.4Low  GM/DL    Total Protein 5.8Low  GM/DL    Total Bilirubin 0.3 MG/DL    ALT 10 U/L    AST 13Low  IU/L    Alkaline Phosphatase 105 IU/L    GFR Non-African American 23Low  mL/min/1.73m2     WBC 13. 1High  K/CU MM     RBC 2.95Low  M/CU MM    Hemoglobin 8.6Low  GM/DL    Hematocrit 27. 0Low  %    MCV 91.5 FL    MCH 29.2 PG    MCHC 31.9Low  %    RDW 13.0 %    Platelets 849 K/CU MM    MPV 9.5 FL     Decision pending with ID about outpatient antibiotics, IV or po. Will need wound vac after discharge.   Sonja Castano MD

## 2021-05-20 NOTE — PROGRESS NOTES
[facility]  [facility] [address]   Progress Note  [facility] 0 1 2      Date: 2021   Patient: Burke Whittington   : 1975   DOA: 2021   MRN: [id number]   ROOM#: [id number]     Admit Date: 2021     Collaborating Urologist on Call at time of admission: Dr. Balta Yanes    CC: Scrotal Swelling   s/p Scrotal Exploration with Scrotal and Perineal Debridement with Dr. Balta Yanes and Dr. Lola Morrow on 5/15/21  s/p repeat scrotal/perineal debridement on 21    Subjective:     Pain: mild, no nausea and no vomiting,   Bowel Movement/Flatus:   Yes  Voiding: patient notes difficulty starting his urine stream     \"I feel a little better\"     Objective:    Vitals:    BP (!) 196/90   Pulse 82   Temp 96.8 °F (36 °C) (Oral)   Resp 14   Ht 5' 9\" (1.753 m)   Wt 293 lb 3.2 oz (133 kg)   SpO2 98%   BMI 43.30 kg/m²    Temp  Av.8 °F (36.6 °C)  Min: 96.8 °F (36 °C)  Max: 98.5 °F (36.9 °C)       Intake/Output Summary (Last 24 hours) at 2021 6375  Last data filed at 2021 6377  Gross per 24 hour   Intake 1050 ml   Output 3700 ml   Net -2650 ml       Physical Exam:   General appearance: alert, appears stated age, cooperative, no distress and morbidly obese  Head: Normocephalic, without obvious abnormality, atraumatic  Abdomen: protuberant abdomen, soft, non-tender, non-distended  Male genitalia: mildly buried penis, condom catheter in place, urine clear/yellow, s/p scrotal exploration/debridement, wound edges appear healthy, wound vac in place    Labs:   WBC:    Lab Results   Component Value Date    WBC 12.2 2021      Hemoglobin/Hematocrit:    Lab Results   Component Value Date    HGB 8.1 2021    HCT 24.9 2021      BMP:   Lab Results   Component Value Date     2021    K 4.0 2021     2021    CO2 21 2021    BUN 31 2021    LABALBU 2.2 2021    LABALBU 48 2021    CREATININE 2.3 2021    CALCIUM 7.6 2021    GFRAA 37 05/20/2021    LABGLOM 31 05/20/2021      PT/INR:    Lab Results   Component Value Date    PROTIME 11.8 03/22/2019    PROTIME 12.5 12/13/2010    INR 1.02 03/22/2019      PTT:    Lab Results   Component Value Date    APTT 30.8 03/22/2019        Blood Culture:  5/13/21 Negative   Urine Culture:  5/15/21 Negative   Surgical Culture: 5/15/21   DIPHTHEROIDS Light growth No further workup    Culture Abnormal  05/15/2021  4:18 PM [address] Rare growth No further workup    Culture Abnormal  05/15/2021  4:18 PM [address] [facility] Moderate growth No further workup Sensitivities not routinely done. Drugs of choice are: Penicillin G, Metronidazole, Clindamycin or Piperacillin/Tazobactam.       Imaging:   CT PELVIS WO CONTRAST Additional Contrast? None    Result Date: 5/15/2021  EXAMINATION: CT OF THE PELVIS WITHOUT CONTRAST 5/15/2021 10:57 am TECHNIQUE: CT of the pelvis was performed without the administration of intravenous contrast.  Multiplanar reformatted images are provided for review. Dose modulation, iterative reconstruction, and/or weight based adjustment of the mA/kV was utilized to reduce the radiation dose to as low as reasonably achievable. COMPARISON: Scrotal ultrasound 05/13/2021, CT pelvis 01/10/2019. HISTORY: ORDERING SYSTEM PROVIDED HISTORY: scrotal cellulitis, rule out perineal and scrotal abscess TECHNOLOGIST PROVIDED HISTORY: Reason for exam:->scrotal cellulitis, rule out perineal and scrotal abscess Additional Contrast?->None Reason for Exam: scrotal cellulitis, rule out perineal and scrotal abscess Acuity: Acute Type of Exam: Initial FINDINGS: There is a large amount of stranding throughout the perineum which extends into the scrotum. There is associated soft tissue gas. 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 No abscess, although there is extensive scrotal edema. The urinary bladder is nondistended.   There are mildly prominent bilateral inguinal lymph nodes, measuring up to 1.4 cm, which are most likely reactive. Visualized bowel loops in the pelvis are unremarkable. The appendix is normal. No suspicious osteolytic or osteoblastic lesions are demonstrated. 1. Stranding and gas within the perineum and base of scrotum. 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 Surgical consultation is recommended. 2. Extensive scrotal edema. 3. No abscess. Results of this examination were verbally communicated to the patient's nurse, Jakub Amanda, at 1:16 p.m. on 05/15/2021 by the [address] [place] [address] US SCROTUM AND TESTICLES    Result Date: 5/13/2021  EXAMINATION: ULTRASOUND OF THE SCROTUM/TESTICLES WITH COLOR DOPPLER FLOW EVALUATION; DOPPLER EVALUATION OF THE PELVIS 5/13/2021 COMPARISON: None. HISTORY: ORDERING SYSTEM PROVIDED HISTORY: Right-sided testicular swelling TECHNOLOGIST PROVIDED HISTORY: Reason for exam:->Right-sided testicular swelling Reason for Exam: Rt pain and swelling Acuity: Acute Type of Exam: Initial; ORDERING SYSTEM PROVIDED HISTORY: Rt swelling TECHNOLOGIST PROVIDED HISTORY: Reason for exam:->Rt swelling Reason for Exam: Rt pain and swelling Acuity: Acute Type of Exam: Initial FINDINGS: Measurements: Right testicle: 2.9 x 3.1 x 3.7 cm Left testicle: 2.5 x 3.0 x 3.8 cm Right: Grey scale: The right testicle demonstrates normal homogeneous echotexture without focal lesion. No evidence of testicular microlithiasis. Doppler Evaluation:  There is normal arterial and venous Doppler flow within the testicle. Scrotal Sac:  No evidence of hydrocele. Scrotal wall edema is noted. Epididymis: The epididymis is enlarged and heterogeneous but does not demonstrate increased flow. Left: Grey scale: The left testicle demonstrates normal homogeneous echotexture without focal lesion. No evidence of testicular microlithiasis.  Doppler Evaluation:  There is normal arterial and venous Doppler flow within the testicle. Scrotal Sac: There is a trace hydrocele. Scrotal wall edema is noted. Epididymis: The left epididymis could not be identified. 1. Normal flow in both testicles. 2. Appearance of the right epididymis suggests prior epididymitis. US DUP ABD PEL RETRO SCROT COMPLETE    Result Date: 5/13/2021  EXAMINATION: ULTRASOUND OF THE SCROTUM/TESTICLES WITH COLOR DOPPLER FLOW EVALUATION; DOPPLER EVALUATION OF THE PELVIS 5/13/2021 COMPARISON: None. HISTORY: ORDERING SYSTEM PROVIDED HISTORY: Right-sided testicular swelling TECHNOLOGIST PROVIDED HISTORY: Reason for exam:->Right-sided testicular swelling Reason for Exam: Rt pain and swelling Acuity: Acute Type of Exam: Initial; ORDERING SYSTEM PROVIDED HISTORY: Rt swelling TECHNOLOGIST PROVIDED HISTORY: Reason for exam:->Rt swelling Reason for Exam: Rt pain and swelling Acuity: Acute Type of Exam: Initial FINDINGS: Measurements: Right testicle: 2.9 x 3.1 x 3.7 cm Left testicle: 2.5 x 3.0 x 3.8 cm Right: Grey scale: The right testicle demonstrates normal homogeneous echotexture without focal lesion. No evidence of testicular microlithiasis. Doppler Evaluation:  There is normal arterial and venous Doppler flow within the testicle. Scrotal Sac:  No evidence of hydrocele. Scrotal wall edema is noted. Epididymis: The epididymis is enlarged and heterogeneous but does not demonstrate increased flow. Left: Grey scale: The left testicle demonstrates normal homogeneous echotexture without focal lesion. No evidence of testicular microlithiasis. Doppler Evaluation:  There is normal arterial and venous Doppler flow within the testicle. Scrotal Sac: There is a trace hydrocele. Scrotal wall edema is noted. Epididymis: The left epididymis could not be identified. 1. Normal flow in both testicles. 2. Appearance of the right epididymis suggests prior epididymitis.        Assessment & Plan:      Tobias Lechuga is a 55y.o. year old male admitted 5/13/2021 for scrotal cellulitis. 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 Findings are most compatible with necrotizing fasciitis/Irene gangrene   POD #5 Scrotal Exploration with Scrotal and Perineal Debridement with Dr. Karlie Roberts and Dr. Atul Cabrera   POD #4 repeat scrotal/perineal debridement   Urine culture Negative; Blood Cultures 5/13/21 Negative x2   Surgical Culture 5/15/21 +Diphtheroids, Staph epidermidis and Clostridium innocuum   On IV Vanco and Flagyl, Infectious Disease now following    WBC 12.2, Afebrile   Continue Wound Vac, managed by Wound Care, plans for exchange on MWF   No surgical intervention/repeat debridement recommended at this time, wound edges appear healthy    Patient will require home health for wound vac care upon discharge    Defer antibiotic coverage to Infectious Disease   Start Flomax due to urinary hesitancy, PVR 145cc    Patient continues to improve, overall stable from a  standpoint     Patient to follow-up with Dr. Karlie Roberts in 1-2 weeks upon discharge   Patient seen and examined, chart reviewed.      Electronically signed by Carlos Brewer PA-C on 5/20/2021 at 9:11 AM

## 2021-05-20 NOTE — PROGRESS NOTES
Comprehensive Nutrition Assessment    Type and Reason for Visit:  Initial (LOS)    Nutrition Recommendations/Plan:   Add 5 carb choice diet modifier to better meet estimated kcal needs  Add wound healing oral nutrition supplement BID  Encourage po intake as able  Please document all po intake  Will f/u for diet education needs when patient appropriate  Will monitor po intake, nutrition status, poc    Nutrition Assessment:  Pt admitted for cellulitis of scrotum, CARMEN, PMH: DM, s/p initial debridement for tolu gangrene and planned second look, +wound vac, pt currently on carb control diet, pt consuming ~% of meals documented in the past 72 hr, visited pt at bedside, pt sleepy at time of visit, states he just received pain meds, reports UBW ~270#, denies any recent unintentional wt loss, reports fair appetite, denies any chewing or swallowing difficulty, reports that he tries to count carbs at home however does a poor job, [place] \"Carbohydrate counting for people with diabetes\" handout given however pt requested that RD return later as he is having a difficult time staying awake, dietitian contact information provided, pt is at moderate nutrition risk    Malnutrition Assessment:  Malnutrition Status: At risk for malnutrition (Comment)    Context:  Acute Illness       Estimated Daily Nutrient Needs:  Energy (kcal):  [id number] ([address] w/ stress factor 1.0-1.1); Weight Used for Energy Requirements:  Current     Protein (g):   (1.2-1.5 g/kg); Weight Used for Protein Requirements:  Ideal          Nutrition Related Findings:  BUN 31, Cr 2.3, Mag 2.5, Glucose 163, Hemoglobin A1c 9.7      Wounds:  Diabetic Ulcer, Wound Vac (tolu's gangrene of scrotum, non-healing wound)       Current Nutrition Therapies:    DIET CARB CONTROL;     Anthropometric Measures:  · Height: 5' 9.02\" (175.3 cm)  · Current Body Weight: 293 lb 3.4 oz (133 kg)   · Admission Body Weight: 300 lb 0.7 oz (136.1 kg) (first measured weight)    · Usual Body Weight: 274 lb 11.1 oz (124.6 kg) ((3/23/21) per chart review)     · Ideal Body Weight: 160 lbs; % Ideal Body Weight 183.3 %   · BMI: 43.3  · BMI Categories: Obese Class 3 (BMI 40.0 or greater)       Nutrition Diagnosis:   · Altered nutrition-related lab values related to endocrine dysfuntion as evidenced by abnormal hemoglobin A1c level    Nutrition Interventions:   Food and/or Nutrient Delivery:  Modify Current Diet, Start Oral Nutrition Supplement  Nutrition Education/Counseling:  Education initiated   Coordination of Nutrition Care:  Continue to monitor while inpatient    Goals:  pt will consume greater than 75% of meals and supplements       Nutrition Monitoring and Evaluation:   Behavioral-Environmental Outcomes:  None Identified   Food/Nutrient Intake Outcomes:  Food and Nutrient Intake, Supplement Intake  Physical Signs/Symptoms Outcomes:  Biochemical Data, Weight, GI Status, Skin, Hemodynamic Status, Fluid Status or Edema     Discharge Planning:     Too soon to determine     Electronically signed by Genesis Hassan, MS, RD, LD on 5/20/21 at 10:26 AM EDT    Contact: [phone]

## 2021-05-20 NOTE — PROGRESS NOTES
[address] [facility]  consulted by Dr. Griselda Cardona for monitoring and adjustment. Indication for treatment: SSTI, tolu's gangrene  Goal trough: 10-15 mcg/mL (staph epi)  AUC/PAMELA: <500    Pertinent Laboratory Values:   Temp Readings from Last 3 Encounters:   05/20/21 96.8 °F (36 °C) (Oral)   05/16/21 98.6 °F (37 °C)   05/15/21 98.6 °F (37 °C)     Recent Labs     05/18/21  0819 05/19/21  0453 05/20/21  0652   WBC 13.3* 13.1* 12.2*     Recent Labs     05/18/21  0819 05/19/21  0453   BUN 51* 41*   CREATININE 3.4* 3.0*     Estimated Creatinine Clearance: 42 mL/min (A) (based on SCr of 3 mg/dL (H)). Intake/Output Summary (Last 24 hours) at 5/20/2021 0843  Last data filed at 5/20/2021 0313  Gross per 24 hour   Intake 810 ml   Output 2900 ml   Net -2090 ml       Pertinent Cultures:  Date    Source    Results  5/13   Blood    NGTD  5/15   Urine    NGTD  5/15   Surgical   Staph epi, clostridium innocuum     Vancomycin level:   TROUGH:  No results for input(s): VANCOTROUGH in the last 72 hours.   RANDOM:    Recent Labs     05/19/21  0453   VANCORANDOM 13.5     Assessment:  · WBC and temperature: WBC elevated; afebrile  · SCr, BUN, and urine output: CARMEN on CKD (improving)  · Day(s) of therapy: 8  · Vancomycin concentration: 13.5 (36h post-dose)    Plan:  · Continue pulse dosing based on levels 2/2 renal function changes  · Currently estimated at 1500 mg q36h   · May need dose increase as renal function continues to improve   · Historically regimens of 1500 mg q18-24h have produced therapeutic levels  · Pharmacy will continue to monitor patient and adjust therapy as indicated    Marleni 3 5/21 @ 0600     Thank you for the consult,  Kenia Crowell, [facility], PharmD, BCPS    5/20/2021 8:43 AM

## 2021-05-20 NOTE — PROGRESS NOTES
injury  2. underlying DM/NS/DKD  3. Orin abscess- infection / gangrene - S/p surgical and with medical therapy   4. HTN    Recommendation/Plan  :     1. Manual BP  2.  Start lisinopril 40   And chlorthalidone 25/d for now   Low salt DASH diet  Wt loss    Low dev  Also good glucemic control  May d/C from my standpoint   Need CMP in 1 wk  F/U with me in 2 wk's  He will need aggressive risk reduction  with high risk for MACE and Marie Harley MD MD

## 2021-05-21 VITALS
OXYGEN SATURATION: 97 % | SYSTOLIC BLOOD PRESSURE: 191 MMHG | HEART RATE: 85 BPM | WEIGHT: 298.9 LBS | HEIGHT: 69 IN | BODY MASS INDEX: 44.27 KG/M2 | RESPIRATION RATE: 18 BRPM | TEMPERATURE: 98 F | DIASTOLIC BLOOD PRESSURE: 98 MMHG

## 2021-05-21 PROBLEM — E66.01 MORBID OBESITY WITH BODY MASS INDEX (BMI) OF 40.0 TO 44.9 IN ADULT (HCC): Status: ACTIVE | Noted: 2021-05-21

## 2021-05-21 LAB
ALBUMIN SERPL-MCNC: 2.2 GM/DL (ref 3.4–5)
ALP BLD-CCNC: 83 IU/L (ref 40–128)
ALT SERPL-CCNC: 9 U/L (ref 10–40)
ANION GAP SERPL CALCULATED.3IONS-SCNC: 12 MMOL/L (ref 4–16)
AST SERPL-CCNC: 13 IU/L (ref 15–37)
BILIRUB SERPL-MCNC: 0.2 MG/DL (ref 0–1)
BUN BLDV-MCNC: 26 MG/DL (ref 6–23)
CALCIUM SERPL-MCNC: 7.8 MG/DL (ref 8.3–10.6)
CHLORIDE BLD-SCNC: 105 MMOL/L (ref 99–110)
CO2: 19 MMOL/L (ref 21–32)
CREAT SERPL-MCNC: 2.1 MG/DL (ref 0.9–1.3)
DOSE AMOUNT: NORMAL
DOSE TIME: NORMAL
GFR AFRICAN AMERICAN: 41 ML/MIN/1.73M2
GFR NON-AFRICAN AMERICAN: 34 ML/MIN/1.73M2
GLUCOSE BLD-MCNC: 207 MG/DL (ref 70–99)
GLUCOSE BLD-MCNC: 238 MG/DL (ref 70–99)
GLUCOSE BLD-MCNC: 242 MG/DL (ref 70–99)
GLUCOSE BLD-MCNC: 253 MG/DL (ref 70–99)
HCT VFR BLD CALC: 25.8 % (ref 42–52)
HEMOGLOBIN: 8.1 GM/DL (ref 13.5–18)
HIGH SENSITIVE C-REACTIVE PROTEIN: 46.6 MG/L
MCH RBC QN AUTO: 28.5 PG (ref 27–31)
MCHC RBC AUTO-ENTMCNC: 31.4 % (ref 32–36)
MCV RBC AUTO: 90.8 FL (ref 78–100)
PDW BLD-RTO: 12.8 % (ref 11.7–14.9)
PLATELET # BLD: 336 K/CU MM (ref 140–440)
PMV BLD AUTO: 8.5 FL (ref 7.5–11.1)
POTASSIUM SERPL-SCNC: 4.4 MMOL/L (ref 3.5–5.1)
RBC # BLD: 2.84 M/CU MM (ref 4.6–6.2)
SODIUM BLD-SCNC: 136 MMOL/L (ref 135–145)
TOTAL PROTEIN: 5.8 GM/DL (ref 6.4–8.2)
VANCOMYCIN RANDOM: 9.5 UG/ML
WBC # BLD: 11 K/CU MM (ref 4–10.5)

## 2021-05-21 PROCEDURE — 97606 NEG PRS WND THER DME>50 SQCM: CPT

## 2021-05-21 PROCEDURE — 6370000000 HC RX 637 (ALT 250 FOR IP): Performed by: PHYSICIAN ASSISTANT

## 2021-05-21 PROCEDURE — 94761 N-INVAS EAR/PLS OXIMETRY MLT: CPT

## 2021-05-21 PROCEDURE — 6360000002 HC RX W HCPCS: Performed by: FAMILY MEDICINE

## 2021-05-21 PROCEDURE — 80202 ASSAY OF VANCOMYCIN: CPT

## 2021-05-21 PROCEDURE — 2580000003 HC RX 258: Performed by: FAMILY MEDICINE

## 2021-05-21 PROCEDURE — 6360000002 HC RX W HCPCS: Performed by: SURGERY

## 2021-05-21 PROCEDURE — 2500000003 HC RX 250 WO HCPCS: Performed by: INTERNAL MEDICINE

## 2021-05-21 PROCEDURE — 36415 COLL VENOUS BLD VENIPUNCTURE: CPT

## 2021-05-21 PROCEDURE — 82962 GLUCOSE BLOOD TEST: CPT

## 2021-05-21 PROCEDURE — 6370000000 HC RX 637 (ALT 250 FOR IP): Performed by: SURGERY

## 2021-05-21 PROCEDURE — 85027 COMPLETE CBC AUTOMATED: CPT

## 2021-05-21 PROCEDURE — 86141 C-REACTIVE PROTEIN HS: CPT

## 2021-05-21 PROCEDURE — 6370000000 HC RX 637 (ALT 250 FOR IP): Performed by: INTERNAL MEDICINE

## 2021-05-21 PROCEDURE — 2580000003 HC RX 258: Performed by: SURGERY

## 2021-05-21 PROCEDURE — 94150 VITAL CAPACITY TEST: CPT

## 2021-05-21 PROCEDURE — 80053 COMPREHEN METABOLIC PANEL: CPT

## 2021-05-21 RX ORDER — AMLODIPINE BESYLATE 5 MG/1
5 TABLET ORAL DAILY
Status: DISCONTINUED | OUTPATIENT
Start: 2021-05-21 | End: 2021-05-21 | Stop reason: HOSPADM

## 2021-05-21 RX ORDER — AMLODIPINE BESYLATE AND BENAZEPRIL HYDROCHLORIDE 5; 20 MG/1; MG/1
1 CAPSULE ORAL DAILY
Qty: 30 CAPSULE | Refills: 3 | Status: ON HOLD
Start: 2021-05-21 | End: 2021-06-15 | Stop reason: HOSPADM

## 2021-05-21 RX ORDER — TAMSULOSIN HYDROCHLORIDE 0.4 MG/1
0.4 CAPSULE ORAL DAILY
Qty: 30 CAPSULE | Refills: 3 | Status: SHIPPED
Start: 2021-05-22 | End: 2021-09-21 | Stop reason: ALTCHOICE

## 2021-05-21 RX ORDER — INSULIN GLARGINE 100 [IU]/ML
60 INJECTION, SOLUTION SUBCUTANEOUS NIGHTLY
Qty: 1 VIAL | Refills: 5 | Status: ON HOLD
Start: 2021-05-21 | End: 2021-05-28 | Stop reason: HOSPADM

## 2021-05-21 RX ORDER — CHLORTHALIDONE 25 MG/1
25 TABLET ORAL DAILY
Status: DISCONTINUED | OUTPATIENT
Start: 2021-05-21 | End: 2021-05-21 | Stop reason: HOSPADM

## 2021-05-21 RX ORDER — OXYCODONE HYDROCHLORIDE AND ACETAMINOPHEN 5; 325 MG/1; MG/1
1 TABLET ORAL EVERY 6 HOURS PRN
Qty: 28 TABLET | Refills: 0 | Status: ON HOLD | OUTPATIENT
Start: 2021-05-21 | End: 2021-05-28 | Stop reason: SDUPTHER

## 2021-05-21 RX ORDER — CHLORTHALIDONE 25 MG/1
25 TABLET ORAL DAILY
Qty: 30 TABLET | Refills: 3 | Status: ON HOLD
Start: 2021-05-21 | End: 2021-06-15 | Stop reason: HOSPADM

## 2021-05-21 RX ORDER — METRONIDAZOLE 500 MG/1
500 TABLET ORAL 3 TIMES DAILY
Qty: 45 TABLET | Refills: 0 | Status: ON HOLD | OUTPATIENT
Start: 2021-05-21 | End: 2021-05-28 | Stop reason: SDUPTHER

## 2021-05-21 RX ADMIN — CHLORTHALIDONE 25 MG: 25 TABLET ORAL at 12:36

## 2021-05-21 RX ADMIN — LISINOPRIL 40 MG: 40 TABLET ORAL at 08:07

## 2021-05-21 RX ADMIN — AMLODIPINE BESYLATE 5 MG: 5 TABLET ORAL at 12:24

## 2021-05-21 RX ADMIN — OXYCODONE HYDROCHLORIDE AND ACETAMINOPHEN 1 TABLET: 5; 325 TABLET ORAL at 09:57

## 2021-05-21 RX ADMIN — ASPIRIN 81 MG: 81 TABLET, COATED ORAL at 08:07

## 2021-05-21 RX ADMIN — HYDROMORPHONE HYDROCHLORIDE 0.5 MG: 1 INJECTION, SOLUTION INTRAMUSCULAR; INTRAVENOUS; SUBCUTANEOUS at 15:29

## 2021-05-21 RX ADMIN — ENOXAPARIN SODIUM 30 MG: 30 INJECTION SUBCUTANEOUS at 08:07

## 2021-05-21 RX ADMIN — TAMSULOSIN HYDROCHLORIDE 0.4 MG: 0.4 CAPSULE ORAL at 08:07

## 2021-05-21 RX ADMIN — VANCOMYCIN 1500 MG: 1 INJECTION, SOLUTION INTRAVENOUS at 18:03

## 2021-05-21 RX ADMIN — HYDROMORPHONE HYDROCHLORIDE 0.5 MG: 1 INJECTION, SOLUTION INTRAMUSCULAR; INTRAVENOUS; SUBCUTANEOUS at 08:07

## 2021-05-21 RX ADMIN — OXYCODONE HYDROCHLORIDE AND ACETAMINOPHEN 1 TABLET: 5; 325 TABLET ORAL at 20:37

## 2021-05-21 RX ADMIN — OXYCODONE HYDROCHLORIDE AND ACETAMINOPHEN 1 TABLET: 5; 325 TABLET ORAL at 02:36

## 2021-05-21 RX ADMIN — METRONIDAZOLE 500 MG: 500 INJECTION, SOLUTION INTRAVENOUS at 09:57

## 2021-05-21 RX ADMIN — HYDROMORPHONE HYDROCHLORIDE 0.5 MG: 1 INJECTION, SOLUTION INTRAMUSCULAR; INTRAVENOUS; SUBCUTANEOUS at 12:23

## 2021-05-21 RX ADMIN — SODIUM CHLORIDE, PRESERVATIVE FREE 10 ML: 5 INJECTION INTRAVENOUS at 08:08

## 2021-05-21 RX ADMIN — METRONIDAZOLE 500 MG: 500 INJECTION, SOLUTION INTRAVENOUS at 02:36

## 2021-05-21 ASSESSMENT — PAIN SCALES - GENERAL
PAINLEVEL_OUTOF10: 9
PAINLEVEL_OUTOF10: 6
PAINLEVEL_OUTOF10: 7
PAINLEVEL_OUTOF10: 8

## 2021-05-21 ASSESSMENT — PAIN DESCRIPTION - FREQUENCY
FREQUENCY: CONTINUOUS

## 2021-05-21 ASSESSMENT — PAIN DESCRIPTION - PAIN TYPE: TYPE: SURGICAL PAIN

## 2021-05-21 ASSESSMENT — PAIN DESCRIPTION - LOCATION: LOCATION: SCROTUM;PERINEUM

## 2021-05-21 ASSESSMENT — PAIN DESCRIPTION - PROGRESSION: CLINICAL_PROGRESSION: NOT CHANGED

## 2021-05-21 ASSESSMENT — PAIN DESCRIPTION - ONSET: ONSET: ON-GOING

## 2021-05-21 NOTE — PROGRESS NOTES
5/20/2021    Late entry--pt was seen inhte morning. Pt is feeling a little better but is tired. Appetite isfair. Lungs clear, abd soft, heart regular, trace leg edema. Wound vac in place. Afebrile. Had some elevated BP, medications have been ordered. Sodium 134Low  MMOL/L    Potassium 4.0 MMOL/L    Chloride 104 mMol/L    CO2 21 MMOL/L    BUN 31High  MG/DL    CREATININE 2.3High  MG/DL    Glucose 163High  MG/DL    Calcium 7.6Low  MG/DL    Albumin 2.2Low  GM/DL    Total Protein 5.7Low  GM/DL    Total Bilirubin 0.2 MG/DL    ALT 8Low  U/L    AST 10Low  IU/L    Alkaline Phosphatase 85 IU/L    GFR Non-African American 31Low  mL/min/1.73m2      WBC 12. 2High  K/CU MM    RBC 2.75Low  M/CU MM    Hemoglobin 8.1Low  GM/DL    Hematocrit 24. 9Low  %    MCV 90.5 FL    MCH 29.5 PG    MCHC 32.5 %    RDW 13.0 %    Platelets 224 K/CU MM    MPV 9.3 FL     WBC count is improving. Notes from ID reviewed, but will need more information about ordering the medication mentioned. May be close to discharge. Will follow blood count.   Amber Silva MD

## 2021-05-21 NOTE — PLAN OF CARE
Problem: Falls - Risk of:  Goal: Will remain free from falls  Description: Will remain free from falls  Outcome: Ongoing  Goal: Absence of physical injury  Description: Absence of physical injury  Outcome: Ongoing     Problem: Pain:  Goal: Pain level will decrease  Description: Pain level will decrease  Outcome: Ongoing  Goal: Control of acute pain  Description: Control of acute pain  Outcome: Ongoing  Goal: Control of chronic pain  Description: Control of chronic pain  Outcome: Ongoing     Problem: Discharge Planning:  Goal: Discharged to appropriate level of care  Description: Discharged to appropriate level of care  Outcome: Ongoing     Problem:  Body Temperature - Imbalanced:  Goal: Ability to maintain a body temperature in the normal range will improve  Description: Ability to maintain a body temperature in the normal range will improve  Outcome: Ongoing     Problem: Mobility - Impaired:  Goal: Mobility will improve to maximum level  Description: Mobility will improve to maximum level  Outcome: Ongoing     Problem: Pain:  Goal: Pain level will decrease  Description: Pain level will decrease  Outcome: Ongoing  Goal: Control of acute pain  Description: Control of acute pain  Outcome: Ongoing  Goal: Control of chronic pain  Description: Control of chronic pain  Outcome: Ongoing     Problem: Skin Integrity - Impaired:  Goal: Will show no infection signs and symptoms  Description: Will show no infection signs and symptoms  Outcome: Ongoing  Goal: Absence of new skin breakdown  Description: Absence of new skin breakdown  Outcome: Ongoing     Problem: Skin Integrity:  Goal: Will show no infection signs and symptoms  Description: Will show no infection signs and symptoms  Outcome: Ongoing  Goal: Absence of new skin breakdown  Description: Absence of new skin breakdown  Outcome: Ongoing     Problem: Nutrition  Goal: Optimal nutrition therapy  Outcome: Ongoing

## 2021-05-21 NOTE — PROGRESS NOTES
Nephrology Progress Note  5/21/2021 8:59 AM        Subjective:   Admit Date: 5/13/2021  PCP: Jodi Remy MD    Interval History: no major events  that I am aware off      Diet: some    ROS:  Pain better   Good UOP  No fever       Data:     Current meds:    lisinopril  40 mg Oral Daily    tamsulosin  0.4 mg Oral Daily    metroNIDAZOLE  500 mg Intravenous Q8H    insulin lispro  0-12 Units Subcutaneous TID WC    insulin lispro  0-6 Units Subcutaneous Nightly    insulin glargine  65 Units Subcutaneous Nightly    insulin lispro  20 Units Subcutaneous Lunch    insulin lispro  15 Units Subcutaneous Daily with breakfast    insulin lispro  30 Units Subcutaneous Dinner    aspirin  81 mg Oral Daily    sodium chloride flush  5-40 mL Intravenous 2 times per day    enoxaparin  30 mg Subcutaneous Daily    vancomycin (VANCOCIN) intermittent dosing (placeholder)   Other See Admin Instructions      dextrose      sodium chloride           I/O last 3 completed shifts: In: 240 [P.O.:240]  Out: 3425 [[id number]]    CBC:   Recent Labs     05/19/21  0453 05/20/21  0652   WBC 13.1* 12.2*   HGB 8.6* 8.1*    370          Recent Labs     05/19/21  0453 05/20/21  0652   * 134*   K 3.9 4.0    104   CO2 23 21   BUN 41* 31*   CREATININE 3.0* 2.3*   GLUCOSE 189* 163*       Lab Results   Component Value Date    CALCIUM 7.6 (L) 05/20/2021    PHOS 4.2 05/20/2021       Objective:     Vitals: BP (!) 191/98   Pulse 85   Temp 98 °F (36.7 °C) (Oral)   Resp 18   Ht 5' 9.02\" (1.753 m)   Wt 298 lb 14.4 oz (135.6 kg)   SpO2 97%   BMI 44.12 kg/m²     General appearance:  No ac distress  HEENT:  No conj pallor  Neck:  supple  Lungs:  No crackles  Heart:  Seems RRR  Abdomen: soft  Extremities:  ++ edema   Groin wound vac       Problem List :         Impression :     1. CARMEN- CKD stage 3 a a3- recovering from sepsis  2. HTN need manual -   3. DM/NS/DKD   4.  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

## 2021-05-21 NOTE — CARE COORDINATION
LSW noted Pt has a discharge for today. LSW delivered the wound vac to Pt room. Pt signed for the vac. LSW faxed the paperwork to [facility]. LSW notified [facility] of the discharge. LSW faxed the home care order to [facility]. Pt will be discharged on the Dalbavancin. Dalbavancin will need a prior auth from [employer]. Once the rx is written, [facility] will fax to the out pt infusion clinic.

## 2021-05-21 NOTE — PROGRESS NOTES
Nutrition Education    Follow up today to assess diet ed needs. Patient not interested in any diet ed on visit. May call later if has questions. · Written educational materials provided from Nutrition Care Manual   · Contact name and number provided. · Refer to Patient Education activity for more details.     Electronically signed by Thomas Cadena RD, ERIC on 5/21/21 at 11:16 AM EDT    Contact: [phone]

## 2021-05-21 NOTE — PROGRESS NOTES
[address] [facility]  consulted by Dr. David Land for monitoring and adjustment. Indication for treatment: SSTI, tolu's gangrene  Goal trough: 10-15 mcg/mL (staph epi)  AUC/PAMELA: <500    Pertinent Laboratory Values:   Temp Readings from Last 3 Encounters:   05/21/21 98 °F (36.7 °C) (Oral)   05/16/21 98.6 °F (37 °C)   05/15/21 98.6 °F (37 °C)     Recent Labs     05/19/21  0453 05/20/21  0652   WBC 13.1* 12.2*     Recent Labs     05/19/21  0453 05/20/21  0652   BUN 41* 31*   CREATININE 3.0* 2.3*     Estimated Creatinine Clearance: 55 mL/min (A) (based on SCr of 2.3 mg/dL (H)). Intake/Output Summary (Last 24 hours) at 5/21/2021 0836  Last data filed at 5/21/2021 0818  Gross per 24 hour   Intake 480 ml   Output 4125 ml   Net -3645 ml       Pertinent Cultures:  Date    Source    Results  5/13   Blood    NGTD  5/15   Urine    NGTD  5/15   Surgical   Staph epi, clostridium innocuum     Vancomycin level:   TROUGH:  No results for input(s): VANCOTROUGH in the last 72 hours.   RANDOM:    Recent Labs     05/19/21  0453   VANCORANDOM 13.5     Assessment:  · WBC and temperature: WBC elevated; afebrile  · SCr, BUN, and urine output: CARMEN on CKD (improving, baseline ~1.2)  · Day(s) of therapy:9  · Vancomycin concentration: 9.5 (36h post-dose)    Plan:  · Continue pulse dosing based on levels 2/2 renal function changes  · Currently estimated at 1500 mg q36h   · Vanco trough 9.5 per lab report   · Re-dose with 1500 mg x 1, if level therapeutic tomorrow schedule regimen  · Historically regimens of 1500 mg q18-24h have produced therapeutic levels  · Noted plans for dalbavancin 1500 mg x 1 at discharge via infusion center   · Pharmacy will continue to monitor patient and adjust therapy as indicated    [place] 3 5/22 @ 1400 if still admitted     Thank you for the consult,  Bubba Jin, [facility], PharmD, BCPS    5/21/2021 8:36 AM

## 2021-05-21 NOTE — PROGRESS NOTES
[facility]  [facility]   Progress Note  [facility] 0 1 2      Date: 2021   Patient: Alondra Soto   : 1975   DOA: 2021   MRN: [id number]   ROOM#: [facility]/1101-A     Admit Date: 2021     Collaborating Urologist on Call at time of admission: Dr. Mai Soler    CC: Scrotal Swelling   s/p Scrotal Exploration with Scrotal and Perineal Debridement with Dr. Mai Soler and Dr. Obdulia Griffith on 5/15/21  s/p repeat scrotal/perineal debridement on 21    Subjective:     Pain: mild, no nausea and no vomiting,   Bowel Movement/Flatus:   Yes  Voiding: easily, improved with Flomax    \"I'm a little sore, ready to go home\"   Patient tolerating wound vac changes  No new changes overnight     Objective:    Vitals:    BP (!) 191/98   Pulse 85   Temp 98 °F (36.7 °C) (Oral)   Resp 18   Ht 5' 9.02\" (1.753 m)   Wt 298 lb 14.4 oz (135.6 kg)   SpO2 97%   BMI 44.12 kg/m²    Temp  Av.2 °F (36.8 °C)  Min: 98 °F (36.7 °C)  Max: 98.3 °F (36.8 °C)       Intake/Output Summary (Last 24 hours) at 2021 7380  Last data filed at 2021 0818  Gross per 24 hour   Intake 240 ml   Output 3325 ml   Net -3085 ml       Physical Exam:   General appearance: alert, appears stated age, cooperative, no distress and morbidly obese  Head: Normocephalic, without obvious abnormality, atraumatic  Abdomen: protuberant abdomen, soft, non-tender, non-distended  Male genitalia: mildly buried penis, condom catheter in place, urine clear/yellow, s/p scrotal exploration/debridement, wound edges appear healthy, wound vac in place    Labs:   WBC:    Lab Results   Component Value Date    WBC 12.2 2021      Hemoglobin/Hematocrit:    Lab Results   Component Value Date    HGB 8.1 2021    HCT 24.9 2021      BMP:   Lab Results   Component Value Date     2021    K 4.0 2021     2021    CO2 21 2021    BUN 31 2021    LABALBU 2.2 2021    LABALBU 48 2021    CREATININE 2.3 05/20/2021    CALCIUM 7.6 05/20/2021    GFRAA 37 05/20/2021    LABGLOM 31 05/20/2021      PT/INR:    Lab Results   Component Value Date    PROTIME 11.8 03/22/2019    PROTIME 12.5 12/13/2010    INR 1.02 03/22/2019      PTT:    Lab Results   Component Value Date    APTT 30.8 03/22/2019        Blood Culture:  5/13/21 Negative   Urine Culture:  5/15/21 Negative   Surgical Culture: 5/15/21   DIPHTHEROIDS Light growth No further workup    Culture Abnormal  05/15/2021  4:18 PM [address] Rare growth No further workup    Culture Abnormal  05/15/2021  4:18 PM [facility] Moderate growth No further workup Sensitivities not routinely done. Drugs of choice are: Penicillin G, Metronidazole, Clindamycin or Piperacillin/Tazobactam.       Imaging:   CT PELVIS WO CONTRAST Additional Contrast? None    Result Date: 5/15/2021  EXAMINATION: CT OF THE PELVIS WITHOUT CONTRAST 5/15/2021 10:57 am TECHNIQUE: CT of the pelvis was performed without the administration of intravenous contrast.  Multiplanar reformatted images are provided for review. Dose modulation, iterative reconstruction, and/or weight based adjustment of the mA/kV was utilized to reduce the radiation dose to as low as reasonably achievable. COMPARISON: Scrotal ultrasound 05/13/2021, CT pelvis 01/10/2019. HISTORY: ORDERING SYSTEM PROVIDED HISTORY: scrotal cellulitis, rule out perineal and scrotal abscess TECHNOLOGIST PROVIDED HISTORY: Reason for exam:->scrotal cellulitis, rule out perineal and scrotal abscess Additional Contrast?->None Reason for Exam: scrotal cellulitis, rule out perineal and scrotal abscess Acuity: Acute Type of Exam: Initial FINDINGS: There is a large amount of stranding throughout the perineum which extends into the scrotum. There is associated soft tissue gas. 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 No abscess, although there is extensive scrotal edema.  The urinary Doppler Evaluation:  There is normal arterial and venous Doppler flow within the testicle. Scrotal Sac: There is a trace hydrocele. Scrotal wall edema is noted. Epididymis: The left epididymis could not be identified. 1. Normal flow in both testicles. 2. Appearance of the right epididymis suggests prior epididymitis. US DUP ABD PEL RETRO SCROT COMPLETE    Result Date: 5/13/2021  EXAMINATION: ULTRASOUND OF THE SCROTUM/TESTICLES WITH COLOR DOPPLER FLOW EVALUATION; DOPPLER EVALUATION OF THE PELVIS 5/13/2021 COMPARISON: None. HISTORY: ORDERING SYSTEM PROVIDED HISTORY: Right-sided testicular swelling TECHNOLOGIST PROVIDED HISTORY: Reason for exam:->Right-sided testicular swelling Reason for Exam: Rt pain and swelling Acuity: Acute Type of Exam: Initial; ORDERING SYSTEM PROVIDED HISTORY: Rt swelling TECHNOLOGIST PROVIDED HISTORY: Reason for exam:->Rt swelling Reason for Exam: Rt pain and swelling Acuity: Acute Type of Exam: Initial FINDINGS: Measurements: Right testicle: 2.9 x 3.1 x 3.7 cm Left testicle: 2.5 x 3.0 x 3.8 cm Right: Grey scale: The right testicle demonstrates normal homogeneous echotexture without focal lesion. No evidence of testicular microlithiasis. Doppler Evaluation:  There is normal arterial and venous Doppler flow within the testicle. Scrotal Sac:  No evidence of hydrocele. Scrotal wall edema is noted. Epididymis: The epididymis is enlarged and heterogeneous but does not demonstrate increased flow. Left: Grey scale: The left testicle demonstrates normal homogeneous echotexture without focal lesion. No evidence of testicular microlithiasis. Doppler Evaluation:  There is normal arterial and venous Doppler flow within the testicle. Scrotal Sac: There is a trace hydrocele. Scrotal wall edema is noted. Epididymis: The left epididymis could not be identified. 1. Normal flow in both testicles. 2. Appearance of the right epididymis suggests prior epididymitis.        Assessment & Plan: Ana Laura Bone is a 55y.o. year old male admitted 5/13/2021 for scrotal cellulitis. 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 Deejay Mac and Dr. Angelo Avery   POD #5 repeat scrotal/perineal debridement   Urine culture Negative; Blood Cultures 5/13/21 Negative x2   Surgical Culture 5/15/21 +Diphtheroids, Staph epidermidis and Clostridium innocuum   On IV Vanco and Flagyl, Infectious Disease now following    WBC 12.2, Afebrile   Continue Wound Vac, managed by Wound Care, plans for exchange on MWF, tolerating changed well    No surgical intervention/repeat debridement recommended at this time, wound edges appear healthy    Patient will require home health for wound vac care upon discharge    Defer antibiotic coverage to Infectious Disease   Continue Flomax due to urinary hesitancy    Patient continues to improve, overall stable from a  standpoint   Patient to follow-up with Dr. Deejay Mac in 1-2 weeks upon discharge   Patient seen and examined, chart reviewed.      Electronically signed by Ana Jay PA-C on 5/21/2021 at 9:51 AM

## 2021-05-21 NOTE — CONSULTS
[address] [address] Continence Nurse  Consult Note       Etienne Suarez  AGE: 55 y.o. GENDER: male  : 1975  TODAY'S DATE:  2021    Subjective:     Reason for  Evaluation and Assessment: wound care for NPWT dressing change to scrotum/perineum. Etienne Suarez is a 55 y.o. male referred by:   [x] Physician  [] Nursing  [] Other:     Wound Identification:  Wound Type: diabetic and non-healing surgical  Contributing Factors: diabetes        PAST MEDICAL HISTORY        Diagnosis Date    Abscess     scrotal    Acute renal failure (ARF) ([address] 2019    Anxiety associated with depression     Anxiety associated with depression     Back pain 2012    Chest pain 2013    Diabetic nephropathy ([address]     Diabetic neuropathy ([address] 2011    Diabetic ulcer of left midfoot associated with type 2 diabetes mellitus, with fat layer exposed ([address] 2017    Diverticulosis     C scope + Dr. Jazmine Tariq DM (diabetes mellitus), type 2 ([address]     DR. Turner podiatry    Hyperlipidemia LDL goal < 100 7/15/2013    Hypertension     Internal hemorrhoid     C scope + Dr. Xu Mcconnell fracture 1988    Panic attacks     Pericarditis     Hospitalized with s/p heart cath normal    Peripheral autonomic neuropathy due to diabetes mellitus ([address]     Axonal EMG- NCS, 2011    Sebaceous cyst 2011    URI (upper respiratory infection) 2012    WD-Wound, surgical, infected, initial encounter 2017    Wrist fracture        PAST SURGICAL HISTORY    Past Surgical History:   Procedure Laterality Date   [address] 2013    Normal (Dr. Juancarlos Crews)    COLONOSCOPY  2011    Pandiverticulosis, Nonbleeding internal hemorrhoids, Repeat colonoscopy at age 48- Dr. Glen Bermeo    \"had reconstruction surgery on nose a year after the other nose surgery\"    OTHER SURGICAL HISTORY Right 2015    I & D right great toe with partial amputation    OTHER SURGICAL HISTORY  12/04/2017    inc and drainage of abcess    OK DEEP INCIS FOOT BONE INFECTN Left 9/22/2018    LEFT FOOT DEBRIDEMENT INCISION AND DRAINAGE TOP AND BOTTOM performed by José Liriano DPM at [facility] N/A 5/15/2021    SCROTAL AND PERINEAL DEBRIDEMENT performed by Selena Nayak MD at [facility] N/A 5/16/2021    SCROTAL RE-DEBRIDEMENT  INCISION AND DRAINAGE performed by Selena Nayak MD at [address] [place] [address] [phone]    sebaceous cyst removal times 4     TOE AMPUTATION      right great toe    TOE AMPUTATION Right 3/23/2019    TOE AMPUTATION RIGHT 5TH TOE performed by José Liriano DPM at [place] [address] TOE AMPUTATION Right 8/6/2019    TOE AMPUTATION RIGHT 4TH TOE performed by José Liriano DPM at [address] [place] [address] UPPER GASTROINTESTINAL ENDOSCOPY  06/2/2011    Mild gastritis with moderatley severe antritis, small hiatal hernia, Dr. Emily Hayden N/A 1/12/2019    EGD BIOPSY performed by Sandra Contreras MD at [facility]    Family History   Problem Relation Age of Onset    Cancer Mother         ?site   [facility] Stroke Mother     Bleeding Prob Mother     Diabetes Father     Heart Disease Father     High Blood Pressure Father     Obesity Father     Kidney Disease Father     Diabetes Sister     High Blood Pressure Sister     Mental Illness Sister     Obesity Sister     High Blood Pressure Other     Mental Illness Other         bipolar    Other Son         cyst in ear canal    ADHD Daughter        SOCIAL HISTORY    Social History     Tobacco Use    Smoking status: Former Smoker     Years: 20.00     Quit date: 11/18/2017     Years since quitting: 3.5    Smokeless tobacco: Never Used   Vaping Use    Vaping Use: Never used   Substance Use Topics    Alcohol use: Yes     Comment: occ    Drug use: Yes     Frequency: 7.0 times per week Types: Marijuana       ALLERGIES    Allergies   Allergen Reactions    Adhesive Tape Rash    Doxycycline Nausea And Vomiting    Reglan [Metoclopramide] Anxiety       MEDICATIONS    No current facility-administered medications on file prior to encounter. Current Outpatient Medications on File Prior to Encounter   Medication Sig Dispense Refill    aspirin 81 MG EC tablet Take 1 tablet by mouth daily 30 tablet 3    insulin lispro (HUMALOG) 100 UNIT/ML injection vial Inject 25 Units into the skin 3 times daily (with meals) 1 vial 5    linagliptin (TRADJENTA) 5 MG tablet Take 1 tablet by mouth daily 30 tablet 5    ondansetron (ZOFRAN) 4 MG tablet Take 1 tablet by mouth every 8 hours as needed for Nausea or Vomiting 20 tablet 0    Lancets MISC 1 each by Does not apply route daily 100 each 3    glucose monitoring kit (FREESTYLE) monitoring kit 1 each by Does not apply route once for 1 dose.  1 kit 0         Objective:      BP (!) 191/98   Pulse 85   Temp 98 °F (36.7 °C) (Oral)   Resp 18   Ht 5' 9.02\" (1.753 m)   Wt 298 lb 14.4 oz (135.6 kg)   SpO2 97%   BMI 44.12 kg/m²   Pradeep Risk Score: Pradeep Scale Score: 20    LABS    CBC:   Lab Results   Component Value Date    WBC 11.0 05/21/2021    RBC 2.84 05/21/2021    HGB 8.1 05/21/2021    HCT 25.8 05/21/2021    MCV 90.8 05/21/2021    MCH 28.5 05/21/2021    MCHC 31.4 05/21/2021    RDW 12.8 05/21/2021     05/21/2021    MPV 8.5 05/21/2021     CMP:    Lab Results   Component Value Date     05/20/2021    K 4.0 05/20/2021     05/20/2021    CO2 21 05/20/2021    BUN 31 05/20/2021    CREATININE 2.3 05/20/2021    GFRAA 37 05/20/2021    LABGLOM 31 05/20/2021    GLUCOSE 163 05/20/2021    PROT 5.7 05/20/2021    PROT 7.1 04/11/2012    LABALBU 2.2 05/20/2021    LABALBU 48 03/27/2021    CALCIUM 7.6 05/20/2021    BILITOT 0.2 05/20/2021    ALKPHOS 85 05/20/2021    AST 10 05/20/2021    ALT 8 05/20/2021     Albumin:    Lab Results   Component Value Date LABALBU 2.2 05/20/2021    LABALBU 48 03/27/2021     PT/INR:    Lab Results   Component Value Date    PROTIME 11.8 03/22/2019    PROTIME 12.5 12/13/2010    INR 1.02 03/22/2019     HgBA1c:    Lab Results   Component Value Date    LABA1C 9.7 05/13/2021         Assessment:     Patient Active Problem List   Diagnosis    Hiatal hernia    Diabetes mellitus type 2, improved controlled    Diabetic neuropathy ([address]    Panic attack    Anxiety associated with depression    Neck pain    DANIELA (obstructive sleep apnea)    Urinary frequency    Bilateral carpal tunnel syndrome- left worse than right    Hyperlipidemia with target LDL less than 100    Essential hypertension    Bronchitis    Osteomyelitis ([address]    Diabetic ulcer of left midfoot ([address]    WD-Diabetic ulcer of left midfoot associated with type 2 diabetes mellitus, with fat layer exposed ([address]    Abscess    Periumbilical hernia    WD-Wound, surgical, infected, initial encounter    Sepsis ([address]    Cellulitis of left foot    Constipation    Intractable nausea and vomiting    Uncontrolled type 2 diabetes mellitus (HCC)    Nausea and vomiting    Diabetic foot infection ([address]    Acute renal failure (ARF) ([address]    Osteomyelitis of fourth toe of right foot ([address]    Cellulitis    Cellulitis of left arm    Cellulitis, scrotum    Acute epididymitis    Irene gangrene    Recurrent major depressive disorder, in full remission ([address]    Generalized anxiety disorder    Morbid obesity with body mass index (BMI) of 40.0 to 44.9 in adult [facility])       Measurements:  Negative Pressure Wound Therapy Groin (Active)   Wound Type Surgical 05/21/21 0930   Unit Type kci 05/21/21 0930   Dressing Type Black foam;White foam 05/21/21 0930   Number of pieces used 4 05/21/21 0820   Cycle Continuous 05/21/21 0930   Target Pressure (mmHg) 125 05/21/21 0930   Canister changed?  No 05/21/21 0930   Dressing Status Changed 05/21/21 0930   Dressing Changed Changed/New 05/19/21 0900   Drainage Amount Moderate 05/19/21 0900   Drainage Description Serosanguinous 05/19/21 0900   Dressing Change Due 05/21/21 05/21/21 0820   Wound Assessment Other (Comment) 05/19/21 0822   Shzaia-wound Assessment Intact 05/19/21 0822   Shape irregular 05/17/21 0930   Odor None 05/19/21 0822   Number of days: 4       Wound 07/18/17 Other (Comment) WOUND #2 LEFT PLANTAR (ONSET 3 MTHS) (Active)   Number of days: 4480       Wound 12/03/17 Other (Comment) Foot Left (Active)   Number of days: 1264       Wound 12/08/17 #3 abdoment (onset 3 days ago) SURGICAL (Active)   Number of days: 1260       Wound 12/08/17 #4 Lt plantar (onset 6 months ago) DIABETIC ALCALA 1 (Active)   Number of days: 1260       Wound 05/18/18 # 5 LEFT PLANTAR (ONSET 1 YEAR AGO) DM WAG 1 (Active)   Number of days: 9419       Wound 09/17/18 Left dorsal foot and 4th toe amp site 9/19/18 (Active)   Number of days: 976       Incision 05/22/15 Foot Right (Active)   Number of days: 2190       Incision 12/04/17 Abdomen Mid (Active)   Number of days: 1466       Incision 09/22/18 Foot Left (Active)   Number of days: 971       Wound 01/11/19 left plantar foot wound (Active)   Number of days: 861       Wound 03/21/19 Toe (Comment  which one) Anterior;Right (Active)   Number of days: 218       Wound 03/21/19 Foot Left;Posterior hard callus area (Active)   Number of days: 147       Wound 03/24/21 Left;Plantar (Active)   Number of days: 57       Wound 05/13/21 Left;Plantar (Active)   Wound Etiology Diabetic 05/21/21 0820   Dressing Status Clean;Dry; Intact 05/21/21 0820   Wound Cleansed Cleansed with saline 05/19/21 0900   Dressing/Treatment Collagen 05/19/21 0900   Wound Length (cm) 0.3 cm 05/13/21 1611   Wound Width (cm) 0.5 cm 05/13/21 1611   Wound Depth (cm) 0.5 cm 05/13/21 1611   Wound Surface Area (cm^2) 0.15 cm^2 05/13/21 1611   Wound Volume (cm^3) 0.08 cm^3 05/13/21 1611   Distance Tunneling (cm) 0 cm 05/19/21 0900   Tunneling Position ___ O'Clock 0 buttock-Dressing/Treatment: Negative pressure wound therapy (mastisol white foam x 1  black foam x 3)     Patient in bed agreeable to vac dressing change to scrotum/perineum. Dressing removed and cleansed with SN. Pt has pain with dressing was pre-medicated. Applied wound vac dressing with white foam x 1 piece over exposed testicle and black foam on the rest x 3 pieces drape and adequate seal obtained @ 125mmhg continuous. Pt to be discharged home with wound vac. Wound care to change vac dressing Monday. Specialty Bed Required : no  [] Low Air Loss   [] Pressure Redistribution  [] Fluid Immersion  [] Bariatric  [] Total Pressure Relief  [] Other:     Discharge Plan:  Placement for patient upon discharge: home  Hospice Care: no  Patient appropriate for Outpatient [address]:  yes    Patient/Caregiver Teaching:  Level of patient/caregiver understanding able to: pt verbalized understanding of wound vac. Electronically signed by Anthony Valencia.  ROBIN Salvador,  on 5/21/2021 at 11:35 AM

## 2021-05-22 NOTE — PROGRESS NOTES
Patient discharged home. Wife here to  patient. Patient educated on wound vac usage. IV removed. Patient escorted per  via wheelchair out of building.

## 2021-05-23 ENCOUNTER — HOSPITAL ENCOUNTER (EMERGENCY)
Age: 46
Discharge: HOME | DRG: 558 | End: 2021-05-23
Payer: MEDICARE

## 2021-05-23 VITALS
SYSTOLIC BLOOD PRESSURE: 158 MMHG | HEART RATE: 72 BPM | OXYGEN SATURATION: 97 % | DIASTOLIC BLOOD PRESSURE: 88 MMHG | TEMPERATURE: 98.5 F | RESPIRATION RATE: 16 BRPM

## 2021-05-23 DIAGNOSIS — Z48.89 ENCOUNTER FOR POSTOPERATIVE WOUND CARE: Primary | ICD-10-CM

## 2021-05-23 PROCEDURE — 99285 EMERGENCY DEPT VISIT HI MDM: CPT

## 2021-05-23 PROCEDURE — 6370000000 HC RX 637 (ALT 250 FOR IP): Performed by: PHYSICIAN ASSISTANT

## 2021-05-23 RX ORDER — OXYCODONE HYDROCHLORIDE AND ACETAMINOPHEN 5; 325 MG/1; MG/1
1 TABLET ORAL ONCE
Status: COMPLETED | OUTPATIENT
Start: 2021-05-23 | End: 2021-05-23

## 2021-05-23 RX ORDER — METRONIDAZOLE 250 MG/1
500 TABLET ORAL ONCE
Status: COMPLETED | OUTPATIENT
Start: 2021-05-23 | End: 2021-05-23

## 2021-05-23 RX ADMIN — METRONIDAZOLE 500 MG: 250 TABLET ORAL at 16:50

## 2021-05-23 RX ADMIN — METRONIDAZOLE 500 MG: 250 TABLET ORAL at 10:50

## 2021-05-23 RX ADMIN — OXYCODONE HYDROCHLORIDE AND ACETAMINOPHEN 1 TABLET: 5; 325 TABLET ORAL at 10:50

## 2021-05-23 RX ADMIN — OXYCODONE HYDROCHLORIDE AND ACETAMINOPHEN 1 TABLET: 5; 325 TABLET ORAL at 16:50

## 2021-05-23 ASSESSMENT — PAIN SCALES - GENERAL
PAINLEVEL_OUTOF10: 7
PAINLEVEL_OUTOF10: 8
PAINLEVEL_OUTOF10: 7
PAINLEVEL_OUTOF10: 8

## 2021-05-23 NOTE — ED PROVIDER NOTES
7:00 PM EDT  Karan Chacon was checked out to me by MANI Zeng. Please see her initial documentation for details of the patient's ED presentation, physical exam and completed studies. In brief, Karan Chacon presented for wound check of groin region and drainage from wounds. Onset was around 1 AM this morning. Patient's wound VAC has not been suctioning properly since that time. He has had recent debridement of the groin and perineal region from Irene's gangrene and follows with Dr. Gennaro Weiss and Dr. Cesar Tee for care. PHYSICAL EXAM:  GENERAL APPEARANCE: Awake and alert. No acute distress. Interacts age appropriately. HEAD: Normocephalic. Atraumatic. EYES:  EOM's grossly intact. Sclera anicteric. ENT: External nose and ears normal.  NECK: Moves neck easily without apparent difficulty. Trachea midline. LUNGS: Respirations unlabored. HEART: Regular rate  ABDOMEN: Non-distended. EXTREMITIES: No acute deformities. SKIN: No visible rashes. NEUROLOGICAL: Moves all 4 extremities spontaneously. Grossly normal coordination. PSYCHIATRIC: Normal mood and affect. I have reviewed and interpreted all of the currently available lab results from this visit (if applicable):      ED COURSE & MEDICAL DECISION MAKING       Vital signs and nursing notes reviewed during ED course. I have independently evaluated this patient. Supervising physician present in the Emergency Department, available for consultation, throughout entirety of patient care. Patient presents as above for wound check and wound VAC not functioning properly. His home health care starts tomorrow. He was signed out to me pending his wound VAC being fixed as it was not suctioning properly. His wound VAC was able to be fixed in the ED and was functioning properly. He is comfortable with discharge home. Patient is nontoxic appearing. Vital signs stable. Patient is stable for outpatient management.   All pertinent Lab data and radiographic results reviewed with patient at bedside. The patient and/or the family were informed of the results of any tests/labs/imaging, the treatment plan, and time was allotted to answer questions. Diagnosis, disposition, and treatment plan reviewed in detail with patient. Patient understands and agrees to follow-up with PCP for recheck in 2-3 days. Patient understands and agrees to return to emergency department for any new or worsening symptoms - strict return precautions given. Final Impression:  1.  Encounter for postoperative wound care        (Please note that portions of this note may have been completed with a voice recognition program. Efforts were made to edit the dictations but occasionally words are mis-transcribed.)    Soha Mcrae, [address] [place] [address]  05/25/21 0325

## 2021-05-23 NOTE — ED NOTES
This nurse spoke with Tello KELLY [place] nursing supervisor. He states there is one nurse who comes in at 1800 who does home health care and may be able to help. There is no one in the house until then that does wound care who works with wound vacs.      August Gardner RN  05/23/21 8214

## 2021-05-23 NOTE — CARE COORDINATION
CM received consult on patient. Patient was discharged from [facility] late on Friday. The patient sts the wound vac was working when he left. It was slower yesterday and he noticed leaking around the site, so today the vacuum was not working at all. He also states his wife worked 12 hours yesterday, so he did not get his Rx from the pharmacy yet, for pain or for antibiotics. He called the Home Health office today and was told to come to the ED. CM spoke to [facility] and she reported that patient needs assistance with getting wound vac fixed for patient. CM called where patient received his wound vac through, [facility] @ [phone]. \" Option: Technical Support. MARIAJOSE spoke to Blue Trujillo requested that either [facility] or RN at hospital (whomever is with patient at this time--to please call Ronnie at above number) Ronnie reported that leakage does happen and Ronnie should be able to fix wound vac with RN over the phone with a couple of simple steps so patient can be discharged home. Patient has [facility] when going home. Patients' wife is picking up his medication today for patient. CM went to patients' room and reviewed the above with patient. Patient in agreement. CM reviewed above with Barrington Martin RN and Funmi Wood is going to call Technical Support w/[employer]. CM reviewed the above with [facility]. Patient should be able to discharged home if [employer] is able to fix wound vac.    17:00 At this time the Nurse has been unable to reach [employer]. CM called [employer] and waited on hold until CM reached assistance in Technical Support at this time; Zohreh KELLY had left hospital. MARIAJOSE Mendoza took phone with [employer] and went to see if she was able to assist and trouble shoot with wound vac. Tech support reported that there is a problem with their machine (wound vac) and will need worked on. Koki KELLY was able to fix Wound Vac machine.  Wound Care Nurse sent supplies downstairs and told Estefany Welch to \"patch it where it was leaking. \" Arnol Guevara RN then reported that she had experience working on wound vacs from working in SNFs'. Patient discharged home after wound vac working.

## 2021-05-23 NOTE — ED PROVIDER NOTES
nephropathy ([address]     Diabetic neuropathy ([address] 12/22/2011    Diabetic ulcer of left midfoot associated with type 2 diabetes mellitus, with fat layer exposed ([address] 7/18/2017    Diverticulosis     C scope + Dr. Yenni Talley DM (diabetes mellitus), type 2 ([address] 2002    DR. Turner podiatry    Hyperlipidemia LDL goal < 100 7/15/2013    Hypertension     Internal hemorrhoid     C scope + Dr. Rylee Lopez Panic attacks     Pericarditis 2003    Hospitalized with s/p heart cath normal    Peripheral autonomic neuropathy due to diabetes mellitus ([address]     Axonal EMG- N[facility], March 2011    Sebaceous cyst 9/1/2011    URI (upper respiratory infection) 2/27/2012    WD-Wound, surgical, infected, initial encounter 12/8/2017    Wrist fracture 1986     Past Surgical History:   Procedure Laterality Date   [address] 2003, 2013    Normal (Dr. Corie Kendrick)    COLONOSCOPY  6/2/2011    Pandiverticulosis, Nonbleeding internal hemorrhoids, Repeat colonoscopy at age 48- Dr. Lis Felton    \"had reconstruction surgery on nose a year after the other nose surgery\"    OTHER SURGICAL HISTORY Right 05/22/2015    I & D right great toe with partial amputation    OTHER SURGICAL HISTORY  12/04/2017    inc and drainage of abcess    OK DEEP INCIS FOOT BONE INFECTN Left 9/22/2018    LEFT FOOT DEBRIDEMENT INCISION AND DRAINAGE TOP AND BOTTOM performed by Kalia Mcmillan DPM at [facility] N/A 5/15/2021    SCROTAL AND PERINEAL DEBRIDEMENT performed by Cherie Jackson MD at [facility] 5/16/2021    SCROTAL RE-DEBRIDEMENT  INCISION AND DRAINAGE performed by Cherie Jackson MD at [address] [place] [address] [phone]    sebaceous cyst removal times 4     TOE AMPUTATION      right great toe    TOE AMPUTATION Right 3/23/2019    TOE AMPUTATION RIGHT 5TH TOE performed by Kalia Mcmillan DPM at [facility] TOE AMPUTATION Right 8/6/2019    TOE  Highest education level: None   Occupational History    None   Tobacco Use    Smoking status: Former Smoker     Years: 20.00     Quit date: 11/18/2017     Years since quitting: 3.5    Smokeless tobacco: Never Used   Vaping Use    Vaping Use: Never used   Substance and Sexual Activity    Alcohol use: Yes     Comment: occ    Drug use: Yes     Frequency: 7.0 times per week     Types: Marijuana    Sexual activity: Yes     Partners: Female   Other Topics Concern    None   Social History Narrative    None     Social Determinants of Health     Financial Resource Strain:     Difficulty of Paying Living Expenses:    Food Insecurity:     Worried About Running Out of Food in the Last Year:     Ran Out of Food in the Last Year:    Transportation Needs:     Lack of Transportation (Medical):      Lack of Transportation (Non-Medical):    Physical Activity:     Days of Exercise per Week:     Minutes of Exercise per Session:    Stress:     Feeling of Stress :    Social Connections:     Frequency of Communication with Friends and Family:     Frequency of Social Gatherings with Friends and Family:     Attends Mosque Services:     Active Member of Clubs or Organizations:     Attends Club or Organization Meetings:     Marital Status:    Intimate Partner Violence:     Fear of Current or Ex-Partner:     Emotionally Abused:     Physically Abused:     Sexually Abused:      Family History   Problem Relation Age of Onset    Cancer Mother         ?site    Stroke Mother     Bleeding Prob Mother     Diabetes Father     Heart Disease Father     High Blood Pressure Father     Obesity Father     Kidney Disease Father     Diabetes Sister     High Blood Pressure Sister     Mental Illness Sister     Obesity Sister     High Blood Pressure Other     Mental Illness Other         bipolar    Other Son         cyst in ear canal    ADHD Daughter          PHYSICAL EXAM    VITAL SIGNS: BP (!) 168/105   Pulse 85 Temp 98.1 °F (36.7 °C) (Oral)   Resp 18   SpO2 99%    Constitutional:  Well developed, Well nourished, elevated BMI  HENT:  Normocephalic, Atraumatic, PERRL. EOMI. Sclera clear. Conjunctiva normal, No discharge. Neck/Lymphatics: supple, no JVD, no swollen nodes  Cardiovascular:  Normal heart rate, Normal rhythm, No murmurs  Respiratory:  Nonlabored breathing. Normal breath sounds, No wheezing  Abdomen: Bowel sounds normal, Soft, No tenderness, no masses. Right inguinal region with wound edges appear healthy. There is a wound VAC in place in the right inguinal region extending to his right scrotum. No significant surrounding erythema, streaking of erythema, induration. Musculoskeletal: No cyanosis  Integument:  See above  Neurologic: Alert & oriented , No focal deficits noted. Psychiatric:  Affect normal, Mood normal.       ED COURSE & MEDICAL DECISION MAKING       Vital signs and nursing notes reviewed during ED course. I have independently evaluated this patient . Supervising MD present in the Emergency Department, available for consultation, throughout entirety of  patient care. Patient presents as above with malfunctioning wound VAC. He does have home health care nursing arriving at his home tomorrow to begin wound care. He was instructed by home health care agency that he will need to go to the ED for evaluation and treatment today. I did consult wound care here in the hospital as well as case management to facilitate getting this changed and functioning again. Wound edges appear healthy, he has no surrounding erythema, induration. He is afebrile. He is given his home dose of Percocet as well as metronidazole that he was discharged with. Case management does get in contact with patient's wound [address] [place] [address]. We attempt to troubleshoot over the phone with the company to get wound VAC functioning again, however unable to.   The [facility] wound care nurse is coming in this evening between 6 and 7 and will come to evaluate patient at that time. ________________________________________________________________________    7:00 PM EDT I have signed out [facility] Emergency Department care to [facility]. We discussed the history, physical exam, completed/pending test results (if obtained) and current treatment plan. At time of patient signout wound care evaluation pending.    ________________________________________________________________________      Clinical  IMPRESSION    1. Encounter for postoperative wound care          Comment: Please note this report has been produced using speech recognition software and may contain errors related to that system including errors in grammar, punctuation, and spelling, as well as words and phrases that may be inappropriate. If there are any questions or concerns please feel free to contact the dictating provider for clarification.           MANI Pérez  05/23/21 6273

## 2021-05-23 NOTE — ED NOTES
Tello KELLY notified that the rep from [facility] has not called back. He sts he will leave a message with the night shift nurse who does wound care.      Eugene Horta RN  05/23/21 4805

## 2021-05-23 NOTE — ED NOTES
This nurse spoke with Kaylin Griffith. The patient's wife is going to the pharmacy to get his meds so he will have them at home tonight.       August Gardner RN  05/23/21 0205

## 2021-05-24 ENCOUNTER — TELEPHONE (OUTPATIENT)
Dept: SURGERY | Age: 46
End: 2021-05-24

## 2021-05-24 ENCOUNTER — HOSPITAL ENCOUNTER (INPATIENT)
Age: 46
LOS: 4 days | Discharge: HOME | DRG: 558 | End: 2021-05-28
Attending: EMERGENCY MEDICINE | Admitting: SURGERY
Payer: MEDICARE

## 2021-05-24 ENCOUNTER — APPOINTMENT (OUTPATIENT)
Dept: CT IMAGING | Age: 46
DRG: 558 | End: 2021-05-24
Payer: MEDICARE

## 2021-05-24 DIAGNOSIS — I10 ACCELERATED HYPERTENSION: ICD-10-CM

## 2021-05-24 DIAGNOSIS — E11.65 TYPE 2 DIABETES MELLITUS WITH HYPERGLYCEMIA, WITH LONG-TERM CURRENT USE OF INSULIN (HCC): ICD-10-CM

## 2021-05-24 DIAGNOSIS — N49.2 CELLULITIS OF SCROTUM: ICD-10-CM

## 2021-05-24 DIAGNOSIS — Z79.4 TYPE 2 DIABETES MELLITUS WITH HYPERGLYCEMIA, WITH LONG-TERM CURRENT USE OF INSULIN (HCC): ICD-10-CM

## 2021-05-24 DIAGNOSIS — N49.3 FOURNIER'S GANGRENE IN MALE: Primary | ICD-10-CM

## 2021-05-24 DIAGNOSIS — I10 HYPERTENSION, UNSPECIFIED TYPE: ICD-10-CM

## 2021-05-24 DIAGNOSIS — N17.9 AKI (ACUTE KIDNEY INJURY) (HCC): ICD-10-CM

## 2021-05-24 PROBLEM — M72.6 NECROTIZING FASCIITIS (HCC): Status: ACTIVE | Noted: 2021-05-24

## 2021-05-24 LAB
ALBUMIN SERPL-MCNC: 2.7 GM/DL (ref 3.4–5)
ALP BLD-CCNC: 79 IU/L (ref 40–129)
ALT SERPL-CCNC: 12 U/L (ref 10–40)
ANION GAP SERPL CALCULATED.3IONS-SCNC: 11 MMOL/L (ref 4–16)
AST SERPL-CCNC: 14 IU/L (ref 15–37)
BASOPHILS ABSOLUTE: 0.1 K/CU MM
BASOPHILS RELATIVE PERCENT: 0.5 % (ref 0–1)
BILIRUB SERPL-MCNC: 0.2 MG/DL (ref 0–1)
BUN BLDV-MCNC: 31 MG/DL (ref 6–23)
CALCIUM SERPL-MCNC: 7.7 MG/DL (ref 8.3–10.6)
CHLORIDE BLD-SCNC: 103 MMOL/L (ref 99–110)
CO2: 20 MMOL/L (ref 21–32)
CREAT SERPL-MCNC: 2.3 MG/DL (ref 0.9–1.3)
DIFFERENTIAL TYPE: ABNORMAL
EOSINOPHILS ABSOLUTE: 0 K/CU MM
EOSINOPHILS RELATIVE PERCENT: 0.4 % (ref 0–3)
GFR AFRICAN AMERICAN: 37 ML/MIN/1.73M2
GFR NON-AFRICAN AMERICAN: 31 ML/MIN/1.73M2
GLUCOSE BLD-MCNC: 219 MG/DL (ref 70–99)
GLUCOSE BLD-MCNC: 234 MG/DL (ref 70–99)
GLUCOSE BLD-MCNC: 295 MG/DL (ref 70–99)
HCT VFR BLD CALC: 28.1 % (ref 42–52)
HEMOGLOBIN: 9.1 GM/DL (ref 13.5–18)
IMMATURE NEUTROPHIL %: 0.8 % (ref 0–0.43)
LYMPHOCYTES ABSOLUTE: 1.2 K/CU MM
LYMPHOCYTES RELATIVE PERCENT: 12.2 % (ref 24–44)
MCH RBC QN AUTO: 28.3 PG (ref 27–31)
MCHC RBC AUTO-ENTMCNC: 32.4 % (ref 32–36)
MCV RBC AUTO: 87.3 FL (ref 78–100)
MONOCYTES ABSOLUTE: 0.6 K/CU MM
MONOCYTES RELATIVE PERCENT: 6.2 % (ref 0–4)
NUCLEATED RBC %: 0 %
PDW BLD-RTO: 12.7 % (ref 11.7–14.9)
PLATELET # BLD: 402 K/CU MM (ref 140–440)
PMV BLD AUTO: 9.1 FL (ref 7.5–11.1)
POTASSIUM SERPL-SCNC: 4.3 MMOL/L (ref 3.5–5.1)
RBC # BLD: 3.22 M/CU MM (ref 4.6–6.2)
SEGMENTED NEUTROPHILS ABSOLUTE COUNT: 7.7 K/CU MM
SEGMENTED NEUTROPHILS RELATIVE PERCENT: 79.9 % (ref 36–66)
SODIUM BLD-SCNC: 134 MMOL/L (ref 135–145)
TOTAL IMMATURE NEUTOROPHIL: 0.08 K/CU MM
TOTAL NUCLEATED RBC: 0 K/CU MM
TOTAL PROTEIN: 6.3 GM/DL (ref 6.4–8.2)
WBC # BLD: 9.6 K/CU MM (ref 4–10.5)

## 2021-05-24 PROCEDURE — 6360000004 HC RX CONTRAST MEDICATION: Performed by: EMERGENCY MEDICINE

## 2021-05-24 PROCEDURE — 2060000000 HC ICU INTERMEDIATE R&B

## 2021-05-24 PROCEDURE — 85025 COMPLETE CBC W/AUTO DIFF WBC: CPT

## 2021-05-24 PROCEDURE — 96376 TX/PRO/DX INJ SAME DRUG ADON: CPT

## 2021-05-24 PROCEDURE — 94761 N-INVAS EAR/PLS OXIMETRY MLT: CPT

## 2021-05-24 PROCEDURE — 80053 COMPREHEN METABOLIC PANEL: CPT

## 2021-05-24 PROCEDURE — 99285 EMERGENCY DEPT VISIT HI MDM: CPT

## 2021-05-24 PROCEDURE — 96375 TX/PRO/DX INJ NEW DRUG ADDON: CPT

## 2021-05-24 PROCEDURE — 82962 GLUCOSE BLOOD TEST: CPT

## 2021-05-24 PROCEDURE — 6360000002 HC RX W HCPCS: Performed by: NURSE PRACTITIONER

## 2021-05-24 PROCEDURE — 1200000000 HC SEMI PRIVATE

## 2021-05-24 PROCEDURE — 96361 HYDRATE IV INFUSION ADD-ON: CPT

## 2021-05-24 PROCEDURE — 96365 THER/PROPH/DIAG IV INF INIT: CPT

## 2021-05-24 PROCEDURE — 36415 COLL VENOUS BLD VENIPUNCTURE: CPT

## 2021-05-24 PROCEDURE — 2700000000 HC OXYGEN THERAPY PER DAY

## 2021-05-24 PROCEDURE — 96367 TX/PROPH/DG ADDL SEQ IV INF: CPT

## 2021-05-24 PROCEDURE — 74177 CT ABD & PELVIS W/CONTRAST: CPT

## 2021-05-24 PROCEDURE — 87040 BLOOD CULTURE FOR BACTERIA: CPT

## 2021-05-24 PROCEDURE — 2580000003 HC RX 258: Performed by: NURSE PRACTITIONER

## 2021-05-24 RX ORDER — ACETAMINOPHEN 650 MG/1
650 SUPPOSITORY RECTAL EVERY 6 HOURS PRN
Status: DISCONTINUED | OUTPATIENT
Start: 2021-05-24 | End: 2021-05-28 | Stop reason: HOSPADM

## 2021-05-24 RX ORDER — MAGNESIUM SULFATE IN WATER 40 MG/ML
2000 INJECTION, SOLUTION INTRAVENOUS PRN
Status: DISCONTINUED | OUTPATIENT
Start: 2021-05-24 | End: 2021-05-28 | Stop reason: HOSPADM

## 2021-05-24 RX ORDER — ASPIRIN 81 MG/1
81 TABLET ORAL DAILY
Status: DISCONTINUED | OUTPATIENT
Start: 2021-05-25 | End: 2021-05-28 | Stop reason: HOSPADM

## 2021-05-24 RX ORDER — MORPHINE SULFATE 4 MG/ML
6 INJECTION, SOLUTION INTRAMUSCULAR; INTRAVENOUS ONCE
Status: COMPLETED | OUTPATIENT
Start: 2021-05-24 | End: 2021-05-24

## 2021-05-24 RX ORDER — POLYETHYLENE GLYCOL 3350 17 G/17G
17 POWDER, FOR SOLUTION ORAL DAILY PRN
Status: DISCONTINUED | OUTPATIENT
Start: 2021-05-24 | End: 2021-05-28 | Stop reason: HOSPADM

## 2021-05-24 RX ORDER — POTASSIUM CHLORIDE 20 MEQ/1
40 TABLET, EXTENDED RELEASE ORAL PRN
Status: DISCONTINUED | OUTPATIENT
Start: 2021-05-24 | End: 2021-05-28 | Stop reason: HOSPADM

## 2021-05-24 RX ORDER — ONDANSETRON 2 MG/ML
4 INJECTION INTRAMUSCULAR; INTRAVENOUS ONCE
Status: COMPLETED | OUTPATIENT
Start: 2021-05-24 | End: 2021-05-24

## 2021-05-24 RX ORDER — DEXTROSE MONOHYDRATE 25 G/50ML
12.5 INJECTION, SOLUTION INTRAVENOUS PRN
Status: DISCONTINUED | OUTPATIENT
Start: 2021-05-24 | End: 2021-05-25

## 2021-05-24 RX ORDER — PROMETHAZINE HYDROCHLORIDE 25 MG/1
12.5 TABLET ORAL EVERY 6 HOURS PRN
Status: DISCONTINUED | OUTPATIENT
Start: 2021-05-24 | End: 2021-05-28 | Stop reason: HOSPADM

## 2021-05-24 RX ORDER — TAMSULOSIN HYDROCHLORIDE 0.4 MG/1
0.4 CAPSULE ORAL DAILY
Status: DISCONTINUED | OUTPATIENT
Start: 2021-05-25 | End: 2021-05-28 | Stop reason: HOSPADM

## 2021-05-24 RX ORDER — METRONIDAZOLE 250 MG/1
500 TABLET ORAL 3 TIMES DAILY
Status: DISCONTINUED | OUTPATIENT
Start: 2021-05-24 | End: 2021-05-28 | Stop reason: HOSPADM

## 2021-05-24 RX ORDER — SODIUM PHOSPHATE, DIBASIC AND SODIUM PHOSPHATE, MONOBASIC 7; 19 G/133ML; G/133ML
1 ENEMA RECTAL DAILY PRN
Status: DISCONTINUED | OUTPATIENT
Start: 2021-05-24 | End: 2021-05-28 | Stop reason: HOSPADM

## 2021-05-24 RX ORDER — ONDANSETRON 4 MG/1
4 TABLET, ORALLY DISINTEGRATING ORAL EVERY 8 HOURS PRN
Status: DISCONTINUED | OUTPATIENT
Start: 2021-05-24 | End: 2021-05-28 | Stop reason: HOSPADM

## 2021-05-24 RX ORDER — MORPHINE SULFATE 4 MG/ML
4 INJECTION, SOLUTION INTRAMUSCULAR; INTRAVENOUS ONCE
Status: COMPLETED | OUTPATIENT
Start: 2021-05-24 | End: 2021-05-24

## 2021-05-24 RX ORDER — NICOTINE POLACRILEX 4 MG
15 LOZENGE BUCCAL PRN
Status: DISCONTINUED | OUTPATIENT
Start: 2021-05-24 | End: 2021-05-25

## 2021-05-24 RX ORDER — SODIUM CHLORIDE 0.9 % (FLUSH) 0.9 %
5-40 SYRINGE (ML) INJECTION PRN
Status: DISCONTINUED | OUTPATIENT
Start: 2021-05-24 | End: 2021-05-28 | Stop reason: HOSPADM

## 2021-05-24 RX ORDER — POTASSIUM CHLORIDE 7.45 MG/ML
10 INJECTION INTRAVENOUS PRN
Status: DISCONTINUED | OUTPATIENT
Start: 2021-05-24 | End: 2021-05-28 | Stop reason: HOSPADM

## 2021-05-24 RX ORDER — DEXTROSE MONOHYDRATE 50 MG/ML
100 INJECTION, SOLUTION INTRAVENOUS PRN
Status: DISCONTINUED | OUTPATIENT
Start: 2021-05-24 | End: 2021-05-25

## 2021-05-24 RX ORDER — INSULIN GLARGINE 100 [IU]/ML
0.15 INJECTION, SOLUTION SUBCUTANEOUS NIGHTLY
Status: DISCONTINUED | OUTPATIENT
Start: 2021-05-24 | End: 2021-05-28 | Stop reason: HOSPADM

## 2021-05-24 RX ORDER — SODIUM CHLORIDE 9 MG/ML
INJECTION, SOLUTION INTRAVENOUS CONTINUOUS
Status: DISCONTINUED | OUTPATIENT
Start: 2021-05-24 | End: 2021-05-28 | Stop reason: HOSPADM

## 2021-05-24 RX ORDER — ACETAMINOPHEN 325 MG/1
650 TABLET ORAL EVERY 6 HOURS PRN
Status: DISCONTINUED | OUTPATIENT
Start: 2021-05-24 | End: 2021-05-28 | Stop reason: HOSPADM

## 2021-05-24 RX ORDER — SODIUM CHLORIDE 9 MG/ML
25 INJECTION, SOLUTION INTRAVENOUS PRN
Status: DISCONTINUED | OUTPATIENT
Start: 2021-05-24 | End: 2021-05-28 | Stop reason: HOSPADM

## 2021-05-24 RX ORDER — 0.9 % SODIUM CHLORIDE 0.9 %
1000 INTRAVENOUS SOLUTION INTRAVENOUS ONCE
Status: COMPLETED | OUTPATIENT
Start: 2021-05-24 | End: 2021-05-24

## 2021-05-24 RX ORDER — ONDANSETRON 2 MG/ML
4 INJECTION INTRAMUSCULAR; INTRAVENOUS EVERY 6 HOURS PRN
Status: DISCONTINUED | OUTPATIENT
Start: 2021-05-24 | End: 2021-05-28 | Stop reason: HOSPADM

## 2021-05-24 RX ORDER — SODIUM CHLORIDE 0.9 % (FLUSH) 0.9 %
5-40 SYRINGE (ML) INJECTION EVERY 12 HOURS SCHEDULED
Status: DISCONTINUED | OUTPATIENT
Start: 2021-05-24 | End: 2021-05-28 | Stop reason: HOSPADM

## 2021-05-24 RX ORDER — OXYCODONE HYDROCHLORIDE AND ACETAMINOPHEN 5; 325 MG/1; MG/1
1 TABLET ORAL EVERY 6 HOURS PRN
Status: DISCONTINUED | OUTPATIENT
Start: 2021-05-24 | End: 2021-05-28 | Stop reason: HOSPADM

## 2021-05-24 RX ORDER — HEPARIN SODIUM 5000 [USP'U]/ML
5000 INJECTION, SOLUTION INTRAVENOUS; SUBCUTANEOUS EVERY 8 HOURS SCHEDULED
Status: DISCONTINUED | OUTPATIENT
Start: 2021-05-24 | End: 2021-05-28 | Stop reason: HOSPADM

## 2021-05-24 RX ADMIN — MORPHINE SULFATE 4 MG: 4 INJECTION, SOLUTION INTRAMUSCULAR; INTRAVENOUS at 20:49

## 2021-05-24 RX ADMIN — SODIUM CHLORIDE 1000 ML: 9 INJECTION, SOLUTION INTRAVENOUS at 15:38

## 2021-05-24 RX ADMIN — MORPHINE SULFATE 6 MG: 4 INJECTION, SOLUTION INTRAMUSCULAR; INTRAVENOUS at 15:40

## 2021-05-24 RX ADMIN — CEFAZOLIN SODIUM 2000 MG: 10 INJECTION, POWDER, FOR SOLUTION INTRAVENOUS at 19:00

## 2021-05-24 RX ADMIN — ONDANSETRON 4 MG: 2 INJECTION INTRAMUSCULAR; INTRAVENOUS at 15:39

## 2021-05-24 RX ADMIN — VANCOMYCIN HYDROCHLORIDE 1750 MG: 5 INJECTION, POWDER, LYOPHILIZED, FOR SOLUTION INTRAVENOUS at 19:43

## 2021-05-24 RX ADMIN — IOPAMIDOL 75 ML: 755 INJECTION, SOLUTION INTRAVENOUS at 17:16

## 2021-05-24 RX ADMIN — MORPHINE SULFATE 4 MG: 4 INJECTION, SOLUTION INTRAMUSCULAR; INTRAVENOUS at 17:55

## 2021-05-24 ASSESSMENT — PAIN SCALES - GENERAL
PAINLEVEL_OUTOF10: 10
PAINLEVEL_OUTOF10: 8
PAINLEVEL_OUTOF10: 10
PAINLEVEL_OUTOF10: 7

## 2021-05-24 ASSESSMENT — PAIN DESCRIPTION - FREQUENCY: FREQUENCY: CONTINUOUS

## 2021-05-24 ASSESSMENT — PAIN DESCRIPTION - ORIENTATION: ORIENTATION: POSTERIOR

## 2021-05-24 ASSESSMENT — PAIN DESCRIPTION - ONSET: ONSET: ON-GOING

## 2021-05-24 ASSESSMENT — PAIN DESCRIPTION - LOCATION: LOCATION: GROIN;PERINEUM;SCROTUM

## 2021-05-24 ASSESSMENT — PAIN DESCRIPTION - PAIN TYPE: TYPE: ACUTE PAIN

## 2021-05-24 ASSESSMENT — PAIN DESCRIPTION - PROGRESSION: CLINICAL_PROGRESSION: GRADUALLY WORSENING

## 2021-05-24 NOTE — TELEPHONE ENCOUNTER
Spoke to [facility] RN earlier, appt made to see Dr. Tiffani Hutchins on 5/25/21 - is Dr. Kj Vann patient did not have any appts open. Recommended to push for WoundVac to remain on as much as possible as standard wet to dry would not help such a big wound.

## 2021-05-24 NOTE — ED TRIAGE NOTES
Pt from home. HTN and wound check. Pt seen yesterday because wound vac on testicle came off. Wasn't able to get it back on. Pain in testicles. Open wound. HTN noted by home aid today 190/100.

## 2021-05-24 NOTE — ED NOTES
Pt. reviewed discharge instructions and follow up instructions. . Pt. verbalizes understanding with no further questions. Pt. ambulatory and not showing any signs of distress.        Billye Cheadle, RN  05/23/21 2009

## 2021-05-24 NOTE — TELEPHONE ENCOUNTER
[facility] nurse called stating pt was sent to hospital due to bp. Nurse stated his wound vac had stopped working Friday when he was sent home and that this was not appropriate for his care as his wound is too large and wanted to make sure when he was sent home that he had different orders.

## 2021-05-24 NOTE — ED NOTES
Pt given ice chips after approval from provider, Ann Villanueva at bedside for pt update.       Anupama Pizano RN  05/24/21 3992
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
## 2021-05-25 LAB
ANION GAP SERPL CALCULATED.3IONS-SCNC: 11 MMOL/L (ref 4–16)
BASOPHILS ABSOLUTE: 0.1 K/CU MM
BASOPHILS RELATIVE PERCENT: 0.5 % (ref 0–1)
BUN BLDV-MCNC: 31 MG/DL (ref 6–23)
CALCIUM SERPL-MCNC: 7.7 MG/DL (ref 8.3–10.6)
CHLORIDE BLD-SCNC: 102 MMOL/L (ref 99–110)
CO2: 20 MMOL/L (ref 21–32)
CREAT SERPL-MCNC: 2.3 MG/DL (ref 0.9–1.3)
DIFFERENTIAL TYPE: ABNORMAL
DOSE AMOUNT: NORMAL
DOSE TIME: NORMAL
EOSINOPHILS ABSOLUTE: 0 K/CU MM
EOSINOPHILS RELATIVE PERCENT: 0.3 % (ref 0–3)
ESTIMATED AVERAGE GLUCOSE: 220 MG/DL
ESTIMATED AVERAGE GLUCOSE: 223 MG/DL
GFR AFRICAN AMERICAN: 37 ML/MIN/1.73M2
GFR NON-AFRICAN AMERICAN: 31 ML/MIN/1.73M2
GLUCOSE BLD-MCNC: 189 MG/DL (ref 70–99)
GLUCOSE BLD-MCNC: 189 MG/DL (ref 70–99)
GLUCOSE BLD-MCNC: 199 MG/DL (ref 70–99)
GLUCOSE BLD-MCNC: 220 MG/DL (ref 70–99)
GLUCOSE BLD-MCNC: 223 MG/DL (ref 70–99)
HBA1C MFR BLD: 9.3 % (ref 4.2–6.3)
HBA1C MFR BLD: 9.4 % (ref 4.2–6.3)
HCT VFR BLD CALC: 26.1 % (ref 42–52)
HEMOGLOBIN: 8.2 GM/DL (ref 13.5–18)
IMMATURE NEUTROPHIL %: 1 % (ref 0–0.43)
LYMPHOCYTES ABSOLUTE: 1 K/CU MM
LYMPHOCYTES RELATIVE PERCENT: 9.6 % (ref 24–44)
MCH RBC QN AUTO: 28.2 PG (ref 27–31)
MCHC RBC AUTO-ENTMCNC: 31.4 % (ref 32–36)
MCV RBC AUTO: 89.7 FL (ref 78–100)
MONOCYTES ABSOLUTE: 0.8 K/CU MM
MONOCYTES RELATIVE PERCENT: 7.2 % (ref 0–4)
NUCLEATED RBC %: 0 %
PDW BLD-RTO: 12.9 % (ref 11.7–14.9)
PLATELET # BLD: 365 K/CU MM (ref 140–440)
PMV BLD AUTO: 9.1 FL (ref 7.5–11.1)
POTASSIUM SERPL-SCNC: 3.9 MMOL/L (ref 3.5–5.1)
RBC # BLD: 2.91 M/CU MM (ref 4.6–6.2)
SEGMENTED NEUTROPHILS ABSOLUTE COUNT: 8.7 K/CU MM
SEGMENTED NEUTROPHILS RELATIVE PERCENT: 81.4 % (ref 36–66)
SODIUM BLD-SCNC: 133 MMOL/L (ref 135–145)
TOTAL IMMATURE NEUTOROPHIL: 0.11 K/CU MM
TOTAL NUCLEATED RBC: 0 K/CU MM
VANCOMYCIN RANDOM: 28.4 UG/ML
WBC # BLD: 10.6 K/CU MM (ref 4–10.5)

## 2021-05-25 PROCEDURE — 36415 COLL VENOUS BLD VENIPUNCTURE: CPT

## 2021-05-25 PROCEDURE — 80202 ASSAY OF VANCOMYCIN: CPT

## 2021-05-25 PROCEDURE — 83036 HEMOGLOBIN GLYCOSYLATED A1C: CPT

## 2021-05-25 PROCEDURE — 1200000000 HC SEMI PRIVATE

## 2021-05-25 PROCEDURE — 2700000000 HC OXYGEN THERAPY PER DAY

## 2021-05-25 PROCEDURE — 6360000002 HC RX W HCPCS: Performed by: FAMILY MEDICINE

## 2021-05-25 PROCEDURE — 97606 NEG PRS WND THER DME>50 SQCM: CPT

## 2021-05-25 PROCEDURE — 6370000000 HC RX 637 (ALT 250 FOR IP): Performed by: FAMILY MEDICINE

## 2021-05-25 PROCEDURE — 82962 GLUCOSE BLOOD TEST: CPT

## 2021-05-25 PROCEDURE — 85027 COMPLETE CBC AUTOMATED: CPT

## 2021-05-25 PROCEDURE — 99222 1ST HOSP IP/OBS MODERATE 55: CPT | Performed by: SURGERY

## 2021-05-25 PROCEDURE — 80048 BASIC METABOLIC PNL TOTAL CA: CPT

## 2021-05-25 PROCEDURE — 2580000003 HC RX 258: Performed by: FAMILY MEDICINE

## 2021-05-25 PROCEDURE — 94761 N-INVAS EAR/PLS OXIMETRY MLT: CPT

## 2021-05-25 PROCEDURE — 85025 COMPLETE CBC W/AUTO DIFF WBC: CPT

## 2021-05-25 RX ORDER — NICOTINE POLACRILEX 4 MG
15 LOZENGE BUCCAL PRN
Status: DISCONTINUED | OUTPATIENT
Start: 2021-05-25 | End: 2021-05-28 | Stop reason: HOSPADM

## 2021-05-25 RX ORDER — DEXTROSE MONOHYDRATE 25 G/50ML
12.5 INJECTION, SOLUTION INTRAVENOUS PRN
Status: DISCONTINUED | OUTPATIENT
Start: 2021-05-25 | End: 2021-05-28 | Stop reason: HOSPADM

## 2021-05-25 RX ORDER — DEXTROSE MONOHYDRATE 50 MG/ML
100 INJECTION, SOLUTION INTRAVENOUS PRN
Status: DISCONTINUED | OUTPATIENT
Start: 2021-05-25 | End: 2021-05-28 | Stop reason: HOSPADM

## 2021-05-25 RX ORDER — MORPHINE SULFATE 2 MG/ML
2 INJECTION, SOLUTION INTRAMUSCULAR; INTRAVENOUS
Status: DISCONTINUED | OUTPATIENT
Start: 2021-05-25 | End: 2021-05-28 | Stop reason: HOSPADM

## 2021-05-25 RX ADMIN — METRONIDAZOLE 500 MG: 250 TABLET ORAL at 00:02

## 2021-05-25 RX ADMIN — METRONIDAZOLE 500 MG: 250 TABLET ORAL at 14:15

## 2021-05-25 RX ADMIN — OXYCODONE HYDROCHLORIDE AND ACETAMINOPHEN 1 TABLET: 5; 325 TABLET ORAL at 00:02

## 2021-05-25 RX ADMIN — OXYCODONE HYDROCHLORIDE AND ACETAMINOPHEN 1 TABLET: 5; 325 TABLET ORAL at 14:15

## 2021-05-25 RX ADMIN — MORPHINE SULFATE 2 MG: 2 INJECTION, SOLUTION INTRAMUSCULAR; INTRAVENOUS at 03:02

## 2021-05-25 RX ADMIN — METRONIDAZOLE 500 MG: 250 TABLET ORAL at 21:43

## 2021-05-25 RX ADMIN — HEPARIN SODIUM 5000 UNITS: 5000 INJECTION INTRAVENOUS; SUBCUTANEOUS at 21:45

## 2021-05-25 RX ADMIN — SODIUM CHLORIDE: 9 INJECTION, SOLUTION INTRAVENOUS at 14:20

## 2021-05-25 RX ADMIN — TAMSULOSIN HYDROCHLORIDE 0.4 MG: 0.4 CAPSULE ORAL at 08:53

## 2021-05-25 RX ADMIN — METRONIDAZOLE 500 MG: 250 TABLET ORAL at 06:09

## 2021-05-25 RX ADMIN — HEPARIN SODIUM 5000 UNITS: 5000 INJECTION INTRAVENOUS; SUBCUTANEOUS at 00:02

## 2021-05-25 RX ADMIN — MORPHINE SULFATE 2 MG: 2 INJECTION, SOLUTION INTRAMUSCULAR; INTRAVENOUS at 21:44

## 2021-05-25 RX ADMIN — MORPHINE SULFATE 2 MG: 2 INJECTION, SOLUTION INTRAMUSCULAR; INTRAVENOUS at 06:08

## 2021-05-25 RX ADMIN — SODIUM CHLORIDE: 9 INJECTION, SOLUTION INTRAVENOUS at 00:02

## 2021-05-25 RX ADMIN — OXYCODONE HYDROCHLORIDE AND ACETAMINOPHEN 1 TABLET: 5; 325 TABLET ORAL at 23:13

## 2021-05-25 RX ADMIN — ASPIRIN 81 MG: 81 TABLET, COATED ORAL at 08:53

## 2021-05-25 RX ADMIN — HEPARIN SODIUM 5000 UNITS: 5000 INJECTION INTRAVENOUS; SUBCUTANEOUS at 14:14

## 2021-05-25 RX ADMIN — INSULIN GLARGINE 19 UNITS: 100 INJECTION, SOLUTION SUBCUTANEOUS at 21:45

## 2021-05-25 RX ADMIN — MORPHINE SULFATE 2 MG: 2 INJECTION, SOLUTION INTRAMUSCULAR; INTRAVENOUS at 11:18

## 2021-05-25 RX ADMIN — OXYCODONE HYDROCHLORIDE AND ACETAMINOPHEN 1 TABLET: 5; 325 TABLET ORAL at 08:53

## 2021-05-25 RX ADMIN — SODIUM CHLORIDE, PRESERVATIVE FREE 10 ML: 5 INJECTION INTRAVENOUS at 21:44

## 2021-05-25 RX ADMIN — SODIUM CHLORIDE, PRESERVATIVE FREE 10 ML: 5 INJECTION INTRAVENOUS at 00:03

## 2021-05-25 RX ADMIN — HEPARIN SODIUM 5000 UNITS: 5000 INJECTION INTRAVENOUS; SUBCUTANEOUS at 06:08

## 2021-05-25 RX ADMIN — INSULIN GLARGINE 19 UNITS: 100 INJECTION, SOLUTION SUBCUTANEOUS at 00:18

## 2021-05-25 ASSESSMENT — PAIN SCALES - GENERAL
PAINLEVEL_OUTOF10: 7
PAINLEVEL_OUTOF10: 0
PAINLEVEL_OUTOF10: 7
PAINLEVEL_OUTOF10: 0
PAINLEVEL_OUTOF10: 8
PAINLEVEL_OUTOF10: 3
PAINLEVEL_OUTOF10: 9
PAINLEVEL_OUTOF10: 3
PAINLEVEL_OUTOF10: 7
PAINLEVEL_OUTOF10: 10
PAINLEVEL_OUTOF10: 9
PAINLEVEL_OUTOF10: 10
PAINLEVEL_OUTOF10: 9
PAINLEVEL_OUTOF10: 4
PAINLEVEL_OUTOF10: 8

## 2021-05-25 ASSESSMENT — PAIN DESCRIPTION - ORIENTATION
ORIENTATION: POSTERIOR

## 2021-05-25 ASSESSMENT — PAIN DESCRIPTION - ONSET
ONSET: PROGRESSIVE
ONSET: ON-GOING
ONSET: PROGRESSIVE
ONSET: ON-GOING

## 2021-05-25 ASSESSMENT — PAIN DESCRIPTION - PAIN TYPE
TYPE: ACUTE PAIN

## 2021-05-25 ASSESSMENT — PAIN DESCRIPTION - LOCATION
LOCATION: SCROTUM
LOCATION: SCROTUM
LOCATION: GROIN
LOCATION: SCROTUM

## 2021-05-25 ASSESSMENT — PAIN - FUNCTIONAL ASSESSMENT: PAIN_FUNCTIONAL_ASSESSMENT: PREVENTS OR INTERFERES SOME ACTIVE ACTIVITIES AND ADLS

## 2021-05-25 ASSESSMENT — PAIN DESCRIPTION - DESCRIPTORS
DESCRIPTORS: ACHING;SHARP;THROBBING;BURNING
DESCRIPTORS: ACHING;SHARP;THROBBING

## 2021-05-25 ASSESSMENT — PAIN DESCRIPTION - FREQUENCY
FREQUENCY: CONTINUOUS

## 2021-05-25 ASSESSMENT — PAIN DESCRIPTION - PROGRESSION
CLINICAL_PROGRESSION: GRADUALLY WORSENING

## 2021-05-25 ASSESSMENT — ENCOUNTER SYMPTOMS
RESPIRATORY NEGATIVE: 1
ALLERGIC/IMMUNOLOGIC NEGATIVE: 1
GASTROINTESTINAL NEGATIVE: 1
EYES NEGATIVE: 1

## 2021-05-25 NOTE — PROGRESS NOTES
[facility]  consulted by Dr. Huizar Adjutant for monitoring and adjustment. Indication for treatment: Irene's gangrene   Goal trough: 10-15 mcg/mL / AUC <500     Ht Readings from Last 1 Encounters:   05/25/21 5' 9\" (1.753 m)     Wt Readings from Last 3 Encounters:   05/21/21 298 lb 14.4 oz (135.6 kg)   04/04/21 260 lb 4.8 oz (118.1 kg)   03/26/21 274 lb 12.8 oz (124.6 kg)      Pertinent Laboratory Values:   Temp Readings from Last 3 Encounters:   05/25/21 98 °F (36.7 °C) (Oral)   05/23/21 98.5 °F (36.9 °C) (Oral)   05/21/21 98 °F (36.7 °C) (Oral)     Recent Labs     05/24/21  1526 05/25/21  0058   WBC 9.6 10.6*     Recent Labs     05/24/21  1526 05/25/21  0058   BUN 31* 31*   CREATININE 2.3* 2.3*     Estimated Creatinine Clearance: 52 mL/min (A) (based on SCr of 2.3 mg/dL (H)). Intake/Output Summary (Last 24 hours) at 5/25/2021 1426  Last data filed at 5/25/2021 1110  Gross per 24 hour   Intake 240 ml   Output 1100 ml   Net -860 ml       Pertinent Cultures:  Date Source Results   5/24/21 Blood Collected   5/15/21  3/24/21 Perineum Wound  Foot Wound Staph epidermidis (pen sensitive)  Staph aureus (pan sensitive)      Previous vancomycin levels:  TROUGH:  No results for input(s): VANCOTROUGH in the last 72 hours.   RANDOM:    Recent Labs     05/25/21  0058   VANCORANDOM 28.4     Assessment:   · WBC and temperature: WBC stable, afebrile   · SCr, BUN, and urine output: CKD, stable   · Day(s) of therapy: #2, resumed  · Vancomycin concentration: to be collected    Plan:  · Previously on vancomycin 1500 mg q24h   · Discharged, supposed to receive dalbavancin infusion as outpatient - unclear if resumed   · Level from this morning was collected a few hours post dose   · Resumed 1500 mg q24h   · Pharmacy will continue to monitor patient and adjust therapy as indicated    Thank you for the consult,  Rose Diaz, PharmD, BCPS  5/25/2021 2:26 PM

## 2021-05-25 NOTE — H&P
HISTORY AND PHYSICAL    2021     Patient Information:    Patient: Karan Chacon     Gender: male  : 1975   Age: 55 y.o. MRN: [id number]  Room : ED21/ED-21      Pt`s preferred phone number :[phone]  [facility]  Extended Emergency Contact Information  Primary Emergency Contact: [facility]  Address: [address] [place] [address] Aidan Annalise[address] Phone: [phone]  Mobile Phone: [phone]  Relation: Spouse  Secondary Emergency Contact: Genesis Goncalves, [address] [facility] Phone: [phone]  Mobile Phone: [phone]  Relation: Parent        PCP:  Jodi Remy MD (Tel: [phone] )    Chief complaint:    Chief Complaint   Patient presents with    Hypertension     180/82    Wound Check     Unable to get wound vac on by home health nurse      Lab Results   Component Value Date    LABA1C 9.7 (H) 2021       Lab Results   Component Value Date    LVEF 58 2019       Body mass index is 40.46 kg/m². History of Present Illness:  Karan Chacon is a 55 y.o. male with resent groin necrotizing fascitis post surgical repair was discharged on IV ABX and wound Vac .  Pt presented to ER after worsening symptoms of pain and difficulty applying wound vac at home and CT repeated in ER revealed worsening condition , pt with past medical history of Abscess, Acute renal failure (ARF) ([address] Anxiety associated with depression, Anxiety associated with depression, Back pain, Chest pain, Diabetic nephropathy ([address] Diabetic neuropathy ([address] Diabetic ulcer of left midfoot associated with type 2 diabetes mellitus, with fat layer exposed ([address] Diverticulosis, DM (diabetes mellitus), type 2 ([address] Hyperlipidemia LDL goal < 100, Hypertension, Internal hemorrhoid, Nose fracture, Panic attacks, Pericarditis, Peripheral autonomic neuropathy due to diabetes mellitus ([address] Sebaceous cyst, URI (upper respiratory infection), WD-Wound, surgical, infected, initial encounter, and Wrist fracture. History obtained from patient . REVIEW OF SYSTEMS:   Constitutional: Negative for fever,chills . ENT: Negative for rhinorrhea,  sore throat. Respiratory: Negative for cough, shortness of breath,wheezing  Cardiovascular: Negative for chest pain, palpitations   Gastrointestinal: Negative for Abdominal pain, nausea, vomiting, diarrhea  Genitourinary: Negative for polyuria, dysuria   Hematologic/Lymphatic: Negative for bleeding tendency, easy bruising  Musculoskeletal: Negative for myalgias and arthralgias  Neurologic: Negative for confusion,dysarthria. Skin: Negative for itching,rash  Psychiatric: Negative for depression,anxiety, agitation. Endocrine: Negative for polydipsia,polyuria,heat /cold intolerance. Past Medical History:   has a past medical history of Abscess, Acute renal failure (ARF) ([address] Anxiety associated with depression, Anxiety associated with depression, Back pain, Chest pain, Diabetic nephropathy ([address] Diabetic neuropathy ([address] Diabetic ulcer of left midfoot associated with type 2 diabetes mellitus, with fat layer exposed ([address] Diverticulosis, DM (diabetes mellitus), type 2 ([address] Hyperlipidemia LDL goal < 100, Hypertension, Internal hemorrhoid, Nose fracture, Panic attacks, Pericarditis, Peripheral autonomic neuropathy due to diabetes mellitus ([address] Sebaceous cyst, URI (upper respiratory infection), WD-Wound, surgical, infected, initial encounter, and Wrist fracture. Past Surgical History:   has a past surgical history that includes Cardiac catheterization (2003, 2013); Tonsillectomy and adenoidectomy (1988); Upper gastrointestinal endoscopy (06/2/2011); Colonoscopy (6/2/2011); Nose surgery (1993); skin biopsy (N/A, [id number]); other surgical history (Right, 05/22/2015); Toe amputation;  Abdomen surgery; other surgical history (12/04/2017); pr deep incis foot bone infectn (Left, 9/22/2018); Upper gastrointestinal endoscopy (N/A, 1/12/2019); Toe amputation (Right, 3/23/2019); Toe amputation (Right, 8/6/2019); Scrotal surgery (N/A, 5/15/2021); and Scrotal surgery (N/A, 5/16/2021). Medications:  No current facility-administered medications on file prior to encounter. Current Outpatient Medications on File Prior to Encounter   Medication Sig Dispense Refill    oxyCODONE-acetaminophen (PERCOCET) 5-325 MG per tablet Take 1 tablet by mouth every 6 hours as needed for Pain for up to 7 days. 28 tablet 0    insulin glargine (LANTUS) 100 UNIT/ML injection vial Inject 60 Units into the skin nightly 1 vial 5    chlorthalidone (HYGROTON) 25 MG tablet Take 1 tablet by mouth daily 30 tablet 3    tamsulosin (FLOMAX) 0.4 MG capsule Take 1 capsule by mouth daily 30 capsule 3    amLODIPine-benazepril (LOTREL) 5-20 MG per capsule Take 1 capsule by mouth daily 30 capsule 3    metroNIDAZOLE (FLAGYL) 500 MG tablet Take 1 tablet by mouth 3 times daily for 15 days 45 tablet 0    aspirin 81 MG EC tablet Take 1 tablet by mouth daily 30 tablet 3    insulin lispro (HUMALOG) 100 UNIT/ML injection vial Inject 25 Units into the skin 3 times daily (with meals) 1 vial 5    linagliptin (TRADJENTA) 5 MG tablet Take 1 tablet by mouth daily 30 tablet 5    ondansetron (ZOFRAN) 4 MG tablet Take 1 tablet by mouth every 8 hours as needed for Nausea or Vomiting 20 tablet 0    Lancets MISC 1 each by Does not apply route daily 100 each 3    glucose monitoring kit (FREESTYLE) monitoring kit 1 each by Does not apply route once for 1 dose. 1 kit 0       Allergies: Allergies   Allergen Reactions    Adhesive Tape Rash    Doxycycline Nausea And Vomiting    Reglan [Metoclopramide] Anxiety        Social History:   reports that he quit smoking about 3 years ago. He quit after 20.00 years of use. He has never used smokeless tobacco. He reports current alcohol use. He reports current drug use. GLUCOSE 219 05/24/2021       Chest Xray:   EKG:    I visualized CXR images and EKG strips      Patient Active Problem List   Diagnosis Code    Hiatal hernia K44.9    Diabetes mellitus type 2, improved controlled E11.65    Diabetic neuropathy ([facility]) E11.40    Panic attack F41.0    Anxiety associated with depression F41.8    Neck pain M54.2    DANIELA (obstructive sleep apnea) G47.33    Urinary frequency R35.0    Bilateral carpal tunnel syndrome- left worse than right G56.03    Hyperlipidemia with target LDL less than 100 E78.5    Essential hypertension I10    Bronchitis J40    Osteomyelitis ([address] M86.9    Diabetic ulcer of left midfoot ([address] E11.621, L97.429    WD-Diabetic ulcer of left midfoot associated with type 2 diabetes mellitus, with fat layer exposed ([address] E11.621, L97.422    Abscess C86.82    Periumbilical hernia O97.6    WD-Wound, surgical, infected, initial encounter [id number]    Sepsis ([address] A41.9    Cellulitis of left foot L03. 116    Constipation K59.00    Intractable nausea and vomiting R11.2    Uncontrolled type 2 diabetes mellitus ([facility]) E11.65    Nausea and vomiting R11.2    Diabetic foot infection ([facility]) E11.628, L08.9    Acute renal failure (ARF) ([facility]) N17.9    Osteomyelitis of fourth toe of right foot ([facility]) M86.9    Cellulitis L03.90    Cellulitis of left arm L03.114    Cellulitis, scrotum N49.2    Acute epididymitis N45.1    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    Recurrent major depressive disorder, in full remission ([address] F33.42    Generalized anxiety disorder F41.1    Morbid obesity with body mass index (BMI) of 40.0 to 44.9 in adult ([facility]) E66.01, Z68.41    Necrotizing fasciitis ([address] M72.6         Active Hospital Problems    Diagnosis     Necrotizing fasciitis ([address] [M72.6]    DM II uncontrolled   ARF on CKD   Depression           Assessment/Plan:   Tele   IV ABX   surg and Uro consult   IVF , monitor renal function   Glucose control, insulin coverage   Home meds , reviewed and resumed as appropriate   Symptoms releif/Pain control  DVT proph           Chrissy Pedro MD    5/24/2021 8:54 PM

## 2021-05-25 NOTE — ED NOTES
Report attempted Tello RN in active pt care and will call back in 5-10min for report.      Chadwick Christopher RN  05/24/21 1145
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
## 2021-05-25 NOTE — CONSULTS
[address] [address] Continence Nurse  Consult Note       Maria E Sotelo  AGE: 55 y.o. GENDER: male  : 1975  TODAY'S DATE:  2021    Subjective:     Reason for  Evaluation and Assessment: wound care assessment place NPWT to rt groin/scrotum wound. Maria E Sotelo is a 55 y.o. male referred by:   [x] Physician  [] Nursing  [] Other:     Wound Identification:  Wound Type: diabetic and non-healing surgical  Contributing Factors: diabetes        PAST MEDICAL HISTORY        Diagnosis Date    Abscess     scrotal    Acute renal failure (ARF) ([address] 2019    Anxiety associated with depression     Anxiety associated with depression     Back pain 2012    Chest pain 2013    Diabetic nephropathy ([address]     Diabetic neuropathy ([address] 2011    Diabetic ulcer of left midfoot associated with type 2 diabetes mellitus, with fat layer exposed ([address] 2017    Diverticulosis     C scope + Dr. iKmberlee Johnson DM (diabetes mellitus), type 2 ([address]     DR. Turner podiatry    Hyperlipidemia LDL goal < 100 7/15/2013    Hypertension     Internal hemorrhoid     C scope + Dr. Martinez Cradle fracture 1988    Panic attacks     Pericarditis     Hospitalized with s/p heart cath normal    Peripheral autonomic neuropathy due to diabetes mellitus ([address]     Axonal EMG- NCS, 2011    Sebaceous cyst 2011    URI (upper respiratory infection) 2012    WD-Wound, surgical, infected, initial encounter 2017    Wrist fracture        PAST SURGICAL HISTORY    Past Surgical History:   Procedure Laterality Date   [address] 2013    Normal (Dr. Coyr Linn)    COLONOSCOPY  2011    Pandiverticulosis, Nonbleeding internal hemorrhoids, Repeat colonoscopy at age 48- Dr. Severiano Scrape    \"had reconstruction surgery on nose a year after the other nose surgery\"    OTHER SURGICAL HISTORY Right 2015    I & D right great toe with partial amputation    OTHER SURGICAL HISTORY  12/04/2017    inc and drainage of abcess    ND DEEP INCIS FOOT BONE INFECTN Left 9/22/2018    LEFT FOOT DEBRIDEMENT INCISION AND DRAINAGE TOP AND BOTTOM performed by Brian Bauer DPM at [facility] N/A 5/15/2021    SCROTAL AND PERINEAL DEBRIDEMENT performed by Matthew Pro MD at [facility] N/A 5/16/2021    SCROTAL RE-DEBRIDEMENT  INCISION AND DRAINAGE performed by Matthew Pro MD at [address] [place] [address] [id number]    sebaceous cyst removal times 4     TOE AMPUTATION      right great toe    TOE AMPUTATION Right 3/23/2019    TOE AMPUTATION RIGHT 5TH TOE performed by Brian Bauer DPM at [place] [address] TOE AMPUTATION Right 8/6/2019    TOE AMPUTATION RIGHT 4TH TOE performed by Brian Bauer DPM at [address] [place] [address] UPPER GASTROINTESTINAL ENDOSCOPY  06/2/2011    Mild gastritis with moderatley severe antritis, small hiatal hernia, Dr. Kimberlyn Guerrier N/A 1/12/2019    EGD BIOPSY performed by Louie Pineda MD at [facility]    Family History   Problem Relation Age of Onset    Cancer Mother         ?site   Arthur Edilberto Stroke Mother     Bleeding Prob Mother     Diabetes Father     Heart Disease Father     High Blood Pressure Father     Obesity Father     Kidney Disease Father     Diabetes Sister     High Blood Pressure Sister     Mental Illness Sister     Obesity Sister     High Blood Pressure Other     Mental Illness Other         bipolar    Other Son         cyst in ear canal    ADHD Daughter        SOCIAL HISTORY    Social History     Tobacco Use    Smoking status: Former Smoker     Years: 20.00     Quit date: 11/18/2017     Years since quitting: 3.5    Smokeless tobacco: Never Used   Vaping Use    Vaping Use: Never used   Substance Use Topics    Alcohol use: Yes     Comment: occ    Drug use: Yes     Frequency: 7.0 times per week callus area (Active)   Number of days: 796       Wound 03/24/21 Left;Plantar (Active)   Number of days: 62       Wound 05/13/21 Left;Plantar (Active)   Wound Etiology Diabetic 05/25/21 1400   Dressing Status New dressing applied 05/25/21 1400   Wound Cleansed Cleansed with saline 05/25/21 1400   Dressing/Treatment Collagen 05/19/21 0900   Wound Length (cm) 0 cm 05/25/21 1400   Wound Width (cm) 0 cm 05/25/21 1400   Wound Depth (cm) 0 cm 05/25/21 1400   Wound Surface Area (cm^2) 0 cm^2 05/25/21 1400   Change in Wound Size % (l*w) 100 05/25/21 1400   Wound Volume (cm^3) 0 cm^3 05/25/21 1400   Wound Healing % 100 05/25/21 1400   Distance Tunneling (cm) 0 cm 05/25/21 1400   Tunneling Position ___ O'Clock 0 05/25/21 1400   Undermining Starts ___ O'Clock 0 05/25/21 1400   Undermining Ends___ O'Clock 0 05/25/21 1400   Undermining Maxium Distance (cm) 0 05/25/21 1400   Wound Assessment Other (Comment) 05/19/21 0822   Drainage Amount None 05/25/21 1400   Drainage Description Serosanguinous 05/19/21 0900   Odor None 05/25/21 1400   Shazia-wound Assessment Intact 05/25/21 1400   Margins Attached edges 05/25/21 1400   Wound Thickness Description not for Pressure Injury Full thickness 05/19/21 0900   Number of days: 11       Wound 05/17/21 Groin Right scrotum extends to lower buttock (Active)   Wound Etiology Non-Healing Surgical 05/25/21 1400   Dressing Status New dressing applied 05/25/21 1400   Wound Cleansed Cleansed with saline 05/25/21 1400   Dressing/Treatment Negative pressure wound therapy 05/25/21 1400   Wound Length (cm) 25 cm 05/25/21 1400   Wound Width (cm) 5.5 cm 05/25/21 1400   Wound Depth (cm) 5.3 cm 05/25/21 1400   Wound Surface Area (cm^2) 137.5 cm^2 05/25/21 1400   Change in Wound Size % (l*w) 59.26 05/25/21 1400   Wound Volume (cm^3) 728.75 cm^3 05/25/21 1400   Wound Healing % 46 05/25/21 1400   Distance Tunneling (cm) 0 cm 05/25/21 1400   Tunneling Position ___ O'Clock 0 05/25/21 1400   Undermining Starts ___ O'Clock 0 05/25/21 1400   Undermining Ends___ O'Clock 0 05/25/21 1400   Undermining Maxium Distance (cm) 0 05/25/21 1400   Wound Assessment Other (Comment) 05/21/21 0820   Drainage Amount Moderate 05/25/21 1400   Drainage Description Serosanguinous 05/25/21 1400   Odor None 05/25/21 1400   Shazia-wound Assessment Intact 05/25/21 1400   Margins Defined edges 05/25/21 1400   Wound Thickness Description not for Pressure Injury Full thickness 05/25/21 1400   Number of days: 8       Response to treatment:  With complaints of pain. Pain Assessment:  Severity:  severe  Quality of pain: sore/sharp  Wound Pain Timing/Severity: dressing change  Premedicated: yes    Plan:     Plan of Care: Wound 05/13/21 Left;Plantar-Dressing/Treatment:  (abd kerlix tape )  Wound 05/17/21 Groin Right scrotum extends to lower buttock-Dressing/Treatment: Negative pressure wound therapy (mastisol white foam x 1  over exposed testes black foam x 5 )     Patient in bed agreeable to wound care assessment. Pt discharged from hospital last week with NPWT to rt groin/scrotum/buttocks wound. Wound care had been changing vac dressing last week. Pt had issues when he went home with vac leaking and home care could not eval yet. Dressing removed cleansed with NS. Measured and pictured. Pt has a lot of pain with dressing change said he was ok for dressing to be changed. Area over the testes have granulated some not as exposed. Applied new vac dressing with white foam x 1  over exposed area then black foam x 5 adequate seal obtained @ 125mmhg continuous. Spoke with pt's nurse if vac dressing starts leaking and cannot be fixed to remove and place wet to dry. Discussed with pt Dr Jake Salinas had said when pt does discharge back home it would probably be better managed with a wet to dry dressing vs wound vac. Pt has dry eschar to left plantar. Cleansed with NS pictured and applied dry dressing.      Specialty Bed Required : no  [] Low Air Loss   [] Pressure Redistribution  [] Fluid Immersion  [] Bariatric  [] Total Pressure Relief  [] Other:     Discharge Plan:  Placement for patient upon discharge: tbd  Hospice Care: no  Patient appropriate for Outpatient [address]: yes    Patient/Caregiver Teaching:  Level of patient/caregiver understanding able to:  Pt verbalized understanding of wound care with vac. Electronically signed by Cassandra Massey.  ROBIN Salvador,  on 5/25/2021 at 2:39 PM

## 2021-05-25 NOTE — CONSULTS
Department of General Surgery   Surgical Service Dr. Esthela Driscoll   Consult Note    Date of Consult: 5/25/21      Reason for Consult:  Recent tolu gangrene and debridement     Requesting Physician:  ED    CHIEF COMPLAINT:  Issues with wound vac    History Obtained From:  patient, electronic medical record    HISTORY OF PRESENT ILLNESS:      The patient is a 55 y.o. male who presented to the ED with complaints described as:      Location: Perineal wound vac, high BP  Quality: N/A, tenderness with dressing changes  Severity: Varies, denies on 10 pt scale currently   Duration: Since Friday (when he was d/c'd -- 3 days prior to admission)  Timing: Acute  Context: Recent debridement x 2 for tolu gangrene  Modifying factors: denies  Associated signs and symptoms: high BP related to pain with dressing changes, denies F/C, denies drainage. Pt initially presented to ED on 5/13/21 with c/o scrotal cellulitis. He was admitted by his PCP the same day. On 5/15/21 a c/s was placed to Urology for the scrotal cellulitis. At that point a CT was obtained demonstrating concerning findings for tolu gangrene. The pt was booked for OR and a c/s to General Surgery was placed for assistance. I was on call at the time and evaluated the patient prior to surgery and assisted with excisional debridement down to muscle (315 sq cm removed). The pt was scheduled for a planned second look on 5/16/21 where an additional less than 100 sq cm of tissue was debrided. The pt had an uneventful hospital course. He was seen by ID and the wound team. He had a wound vac placed. The pt was d/c'd from the hospital this past Friday with a wound vac and with home health. The pt reports that on Saturday (one day after d/c) he started having issues with the wound vac at home. He states it was losing seal when he was having a bowel movement.  He re-presented to the ED on Saturday bc of these issues with the wound vac and was subsequently dextrose 50 % IV solution  12.5 g Intravenous PRN Apryl Sher MD        glucagon (rDNA) injection 1 mg  1 mg Intramuscular PRN Apryl Sher MD        dextrose 5 % solution  100 mL/hr Intravenous PRN Apryl Sher MD        insulin lispro (HUMALOG) injection vial 0-18 Units  0-18 Units Subcutaneous TID WC Apryl Sher MD        insulin lispro (HUMALOG) injection vial 0-9 Units  0-9 Units Subcutaneous Nightly José Luis Izquierdo MD        ondansetron (ZOFRAN-ODT) disintegrating tablet 4 mg  4 mg Oral Q8H PRN José Luis Izquierdo MD        aspirin EC tablet 81 mg  81 mg Oral Daily Apryl Sher MD        oxyCODONE-acetaminophen (PERCOCET) 5-325 MG per tablet 1 tablet  1 tablet Oral Q6H PRN Apryl Sher MD   1 tablet at 05/25/21 0002    tamsulosin (FLOMAX) capsule 0.4 mg  0.4 mg Oral Daily José Luis Izquierdo MD        metroNIDAZOLE (FLAGYL) tablet 500 mg  500 mg Oral TID Apryl Sher MD   500 mg at 05/25/21 0609    0.9 % sodium chloride infusion   Intravenous Continuous José Luis Izquierdo MD 75 mL/hr at 05/25/21 0002 New Bag at 05/25/21 0002    sodium chloride flush 0.9 % injection 5-40 mL  5-40 mL Intravenous 2 times per day Apryl Sher MD   10 mL at 05/25/21 0003    sodium chloride flush 0.9 % injection 5-40 mL  5-40 mL Intravenous PRN Apryl Sher MD        0.9 % sodium chloride infusion  25 mL Intravenous PRN Apryl Sher MD        potassium chloride (KLOR-CON M) extended release tablet 40 mEq  40 mEq Oral ZOYA Sher MD        Or    potassium bicarb-citric acid (EFFER-K) effervescent tablet 40 mEq  40 mEq Oral PRN José Luis Izquierdo MD        Or    potassium chloride 10 mEq/100 mL IVPB (Peripheral Line)  10 mEq Intravenous PRN Apryl Sher MD        magnesium sulfate 2000 mg in 50 mL IVPB premix  2,000 mg Intravenous PRN José Luis Izquierdo MD        promethazine (PHENERGAN) tablet 12.5 mg  12.5 mg Oral Q6H PRN Apryl Sher MD        Or  ondansetron (ZOFRAN) injection 4 mg  4 mg Intravenous Q6H PRN José Luis Izquierdo MD        polyethylene glycol (GLYCOLAX) packet 17 g  17 g Oral Daily PRN José Luis Izquierdo MD        fleet rectal enema 1 enema  1 enema Rectal Daily PRN José Luis Izquierdo MD        acetaminophen (TYLENOL) tablet 650 mg  650 mg Oral Q6H PRN José Luis Izquierdo MD        Or    acetaminophen (TYLENOL) suppository 650 mg  650 mg Rectal Q6H PRN José Luis Izquierdo MD        heparin (porcine) injection 5,000 Units  5,000 Units Subcutaneous 3 times per day Jigna Jameson MD   5,000 Units at 05/25/21 0608    insulin glargine (LANTUS) injection vial 19 Units  0.15 Units/kg Subcutaneous Nightly Jigna Jameson MD   19 Units at 05/25/21 0018    insulin lispro (HUMALOG) injection vial 6 Units  0.05 Units/kg Subcutaneous TID WC Jigna Jameson MD        vancomycin (VANCOCIN) intermittent dosing (placeholder)   Other See Admin Instructions Jigna Jameson MD           Allergies:  Adhesive tape, Doxycycline, and Reglan [metoclopramide]    Social History:   Social History     Socioeconomic History    Marital status:      Spouse name: None    Number of children: None    Years of education: None    Highest education level: None   Occupational History    None   Tobacco Use    Smoking status: Former Smoker     Years: 20.00     Quit date: 11/18/2017     Years since quitting: 3.5    Smokeless tobacco: Never Used   Vaping Use    Vaping Use: Never used   Substance and Sexual Activity    Alcohol use: Yes     Comment: occ    Drug use: Yes     Frequency: 7.0 times per week     Types: Marijuana    Sexual activity: Yes     Partners: Female   Other Topics Concern    None   Social History Narrative    None     Social Determinants of Health     Financial Resource Strain:     Difficulty of Paying Living Expenses:    Food Insecurity:     Worried About Running Out of Food in the Last Year:     Gisella of Food in the Last Year: Transportation Needs:     Lack of Transportation (Medical):  Lack of Transportation (Non-Medical):    Physical Activity:     Days of Exercise per Week:     Minutes of Exercise per Session:    Stress:     Feeling of Stress :    Social Connections:     Frequency of Communication with Friends and Family:     Frequency of Social Gatherings with Friends and Family:     Attends Gnosticist Services:     Active Member of Clubs or Organizations:     Attends Club or Organization Meetings:     Marital Status:    Intimate Partner Violence:     Fear of Current or Ex-Partner:     Emotionally Abused:     Physically Abused:     Sexually Abused:        Family History:   Family History   Problem Relation Age of Onset    Cancer Mother         ?site    Stroke Mother     Bleeding Prob Mother     Diabetes Father     Heart Disease Father     High Blood Pressure Father     Obesity Father     Kidney Disease Father     Diabetes Sister     High Blood Pressure Sister     Mental Illness Sister     Obesity Sister     High Blood Pressure Other     Mental Illness Other         bipolar    Other Son         cyst in ear canal    ADHD Daughter        REVIEW OFSYSTEMS:    Review of Systems   Constitutional: Negative for chills and fever. HENT: Negative. Eyes: Negative. Respiratory: Negative. Cardiovascular: Negative. Gastrointestinal: Negative. Endocrine: Negative. Genitourinary: Negative. Musculoskeletal: Negative. Skin: Positive for wound. Allergic/Immunologic: Negative. Neurological: Negative. Hematological: Negative. Psychiatric/Behavioral: Negative. PHYSICAL EXAM:  Vitals:    05/24/21 2158 05/25/21 0058 05/25/21 0302 05/25/21 0353   BP: (!) 154/79   (!) 146/78   Pulse:  86 85 79   Resp:  11 23 18   Temp: 98.7 °F (37.1 °C)   99 °F (37.2 °C)   TempSrc: Oral   Oral   SpO2: 100%   99%   Weight:       Height: 5' 9\" (1.753 m)          Physical Exam  Vitals reviewed. Constitutional:       General: He is not in acute distress. Appearance: He is obese. He is not ill-appearing, toxic-appearing or diaphoretic. Comments: Resting comfortably in bed. Awakens to voice. No acute distress. HENT:      Head: Normocephalic and atraumatic. Right Ear: External ear normal.      Left Ear: External ear normal.      Nose: Nose normal.   Eyes:      General:         Right eye: No discharge. Left eye: No discharge. Extraocular Movements: Extraocular movements intact. Cardiovascular:      Rate and Rhythm: Normal rate. Pulmonary:      Effort: No respiratory distress. Abdominal:      Palpations: Abdomen is soft. Genitourinary:     Comments: Dressings in placed in perineum. Dressings taken down. Pt tolerated. Healthy wound edges. No foul odor. No drainage. Viable tissue. No fluctuance appreciated. There are some areas of tissue that have eschar secondary to electrocautery use at the previous surgeries. Wound edges are viable. Musculoskeletal:         General: No swelling. Cervical back: Normal range of motion. Skin:     General: Skin is warm. Neurological:      General: No focal deficit present. Mental Status: He is alert.    Psychiatric:         Mood and Affect: Mood normal.           DATA:    CBC with Differential:    Lab Results   Component Value Date    WBC 10.6 05/25/2021    RBC 2.91 05/25/2021    HGB 8.2 05/25/2021    HCT 26.1 05/25/2021     05/25/2021    MCV 89.7 05/25/2021    MCH 28.2 05/25/2021    MCHC 31.4 05/25/2021    RDW 12.9 05/25/2021    SEGSPCT 81.4 05/25/2021    BANDSPCT 20 05/18/2021    LYMPHOPCT 9.6 05/25/2021    MONOPCT 7.2 05/25/2021    MYELOPCT 1 05/18/2021    EOSPCT 1.5 12/16/2010    BASOPCT 0.5 05/25/2021    MONOSABS 0.8 05/25/2021    LYMPHSABS 1.0 05/25/2021    EOSABS 0.0 05/25/2021    BASOSABS 0.1 05/25/2021    DIFFTYPE AUTOMATED DIFFERENTIAL 05/25/2021     CMP:    Lab Results   Component Value Date     05/25/2021    K 3.9 05/25/2021     05/25/2021    CO2 20 05/25/2021    BUN 31 05/25/2021    CREATININE 2.3 05/25/2021    GFRAA 37 05/25/2021    LABGLOM 31 05/25/2021    GLUCOSE 223 05/25/2021    PROT 6.3 05/24/2021    PROT 7.1 04/11/2012    LABALBU 2.7 05/24/2021    LABALBU 48 03/27/2021    CALCIUM 7.7 05/25/2021    BILITOT 0.2 05/24/2021    ALKPHOS 79 05/24/2021    AST 14 05/24/2021    ALT 12 05/24/2021     BMP:    Lab Results   Component Value Date     05/25/2021    K 3.9 05/25/2021     05/25/2021    CO2 20 05/25/2021    BUN 31 05/25/2021    LABALBU 2.7 05/24/2021    LABALBU 48 03/27/2021    CREATININE 2.3 05/25/2021    CALCIUM 7.7 05/25/2021    GFRAA 37 05/25/2021    LABGLOM 31 05/25/2021    GLUCOSE 223 05/25/2021         CT A/P:  1. Mildly improved, but persistent stranding and gas within the perineum and   scrotum.  Findings remain consistent with necrotizing fasciitis/Irene   gangrene. 2. 2 cm right scrotal abscess.    3. Near complete resolution of the hydroceles, which were large on the prior   exam.   Results of this          IMPRESSION:        Patient Active Problem List:     Hiatal hernia     Diabetes mellitus type 2, improved controlled     Diabetic neuropathy ([address]     Panic attack     Anxiety associated with depression     Neck pain     DANIELA (obstructive sleep apnea)     Urinary frequency     Bilateral carpal tunnel syndrome- left worse than right     Hyperlipidemia with target LDL less than 100     Essential hypertension     Bronchitis     Osteomyelitis (HCC)     Diabetic ulcer of left midfoot (HCC)     WD-Diabetic ulcer of left midfoot associated with type 2 diabetes mellitus, with fat layer exposed ([address]     Abscess     Periumbilical hernia     WD-Wound, surgical, infected, initial encounter     Sepsis ([address]     Cellulitis of left foot     Constipation     Intractable nausea and vomiting     Uncontrolled type 2 diabetes mellitus (HCC)     Nausea and vomiting     Diabetic foot infection ([facility]     Acute renal failure (ARF) (HCC)     Osteomyelitis of fourth toe of right foot (HCC)     Cellulitis     Cellulitis of left arm     Cellulitis, scrotum     Acute epididymitis     Tolu gangrene     Recurrent major depressive disorder, in full remission ([facility]     Generalized anxiety disorder     Morbid obesity with body mass index (BMI) of 40.0 to 44.9 in adult [facility])     Necrotizing fasciitis ([facility]      56 y/o M with hx of tolu gangrene, readmitted with hypertension and wound vac issues    PLAN:    -Reviewed CT and lab findings with pt. -Reviewed physical exam findings with pt. The wound has viable tissue. There are no areas that need emergent or urgent debridement. There was mention of scrotal abscess on CT. I did speak with Dr. Rah Concepcion this AM to d/w him findings and he informed me that someone from his team will evaluate the pt and then get in touch with me regarding management. I did inform the pt there are some areas of cauterized tissue but these do not appear necrotic and do not need debrided. - Given location of the wound and issus with keeping seal on wound vac, pt may benefit from wet-to-dry dressing changes instead of wound vac at d/c. Will have wound nurse evaluate. He may also benefit from some sort of inpt facility if they can keep wound vac on as this will expedite the healing process. Otherwise, he may benefit from referral to plastic / reconstructive surgery for possibility of a skin graft.   -Will continue to follow. All of the pt's questions and concerns were addressed.          Selena Rowley MD

## 2021-05-25 NOTE — CARE COORDINATION
.Chart reviewed. The patient states that he plans to return home with wife at discharge. The patient has a PCP and insurance and his wife can drive to doctor's appointments without issues. The patient can afford and take their medications as prescribed. The patient is active with [facility] and has a wound vac. It is unclear to the patient if he will go home with the wound vac or IV antx or not. It appears the patient would benefit from a SNF and wound vac he would likely heal faster but he states he is not going to a SNF. He wants to go home. CM is following. D/c plan is home with [facility]. [facility]

## 2021-05-25 NOTE — PLAN OF CARE
Nutrition Problem #1: Increased nutrient needs  Intervention: Food and/or Nutrient Delivery: Modify Current Diet, Start Oral Nutrition Supplement  Nutritional Goals: Pt will consume at least 75% of meals and supplements

## 2021-05-25 NOTE — ED NOTES
Pt ate with provider permission. ED lunch box and 2 diet ginger ale.  BG checked [address], [address] [facility]  05/24/21 6102

## 2021-05-25 NOTE — PLAN OF CARE
Problem: Pain:  Description: Pain management should include both nonpharmacologic and pharmacologic interventions.   Goal: Pain level will decrease  Description: Pain level will decrease  Outcome: Ongoing  Goal: Control of acute pain  Description: Control of acute pain  Outcome: Ongoing  Goal: Control of chronic pain  Description: Control of chronic pain  Outcome: Ongoing     Problem: Skin Integrity:  Goal: Will show no infection signs and symptoms  Description: Will show no infection signs and symptoms  Outcome: Ongoing  Goal: Absence of new skin breakdown  Description: Absence of new skin breakdown  Outcome: Ongoing     Problem: Falls - Risk of:  Goal: Will remain free from falls  Description: Will remain free from falls  Outcome: Ongoing  Goal: Absence of physical injury  Description: Absence of physical injury  Outcome: Ongoing     Problem: Nutrition  Goal: Optimal nutrition therapy  Outcome: Ongoing

## 2021-05-25 NOTE — PROGRESS NOTES
Comprehensive Nutrition Assessment    Type and Reason for Visit:  Initial, Wound, Positive Nutrition Screen    Nutrition Recommendations/Plan:   · Modified carb control 4 to carb control 5, to better meet needs based on taller stature  · Add wound healing oral nutrition supplement BID and snacks   · Please obtain measured weight to more accurately assess nutritional needs/status  · Please document po intake daily in I/O      Nutrition Assessment:  Admitted with wound vac complications at home and necrotizing fascitis. PMH: DMII, HTN, HLD. Pt states had a small breakfast this morning on Carb Control 4 diet, has some abd pain and nausea, c/o difficulty urinating. Reports good appetite prior to infection, no recent wt loss as current weight was stated. Agreed to start Benny supplements during stay with some snacks. Pt at moderate nutrition risk. Malnutrition Assessment:  Malnutrition Status:  No malnutrition    Context:  Social/Environmental Circumstances     Findings of the 6 clinical characteristics of malnutrition:  Energy Intake:     Weight Loss: Body Fat Loss:        Muscle Mass Loss:       Fluid Accumulation:        Strength:       Estimated Daily Nutrient Needs:  Energy (kcal):  7728-8273 (1.1-1.2 stress factor); Weight Used for Energy Requirements:  Current     Protein (g):   (1.2-1.5 g/kg);  Weight Used for Protein Requirements:  Ideal        Fluid (ml/day):  ~2400; Method Used for Fluid Requirements:  1 ml/kcal      Nutrition Related Findings:  A1c 9.4, , WBC 10.1, last diet ed provided during previous admission (5/21/21)      Wounds:  Wound Vac, Diabetic Ulcer       Current Nutrition Therapies:    DIET CARB CONTROL; Carb Control: 5 carb choices (75 gms)/meal  Dietary Nutrition Supplements: Wound Healing Oral Supplement    Anthropometric Measures:  · Height: 5' 9\" (175.3 cm)  · Current Body Weight: 274 lb 0.5 oz (124.3 kg)   · Admission Body Weight:      · Usual Body Weight: 274 lb 11.1 oz (124.6 kg) (3/23/21 per epic review)     · Ideal Body Weight: 160 lbs; % Ideal Body Weight 171.3 %   · BMI: 40.4  · Adjusted Body Weight:  ; No Adjustment   · Adjusted BMI:      · BMI Categories: Obese Class 3 (BMI 40.0 or greater)       Nutrition Diagnosis:   · Increased nutrient needs related to  (healing) as evidenced by wounds      Nutrition Interventions:   Food and/or Nutrient Delivery:  Modify Current Diet, Start Oral Nutrition Supplement  Nutrition Education/Counseling:  Education not appropriate   Coordination of Nutrition Care:  Continue to monitor while inpatient, Coordination of Community Care    Goals:  Pt will consume at least 75% of meals and supplements       Nutrition Monitoring and Evaluation:   Behavioral-Environmental Outcomes:  None Identified   Food/Nutrient Intake Outcomes:  Food and Nutrient Intake, Supplement Intake  Physical Signs/Symptoms Outcomes:  Biochemical Data, GI Status, Nausea or Vomiting, Fluid Status or Edema, Skin, Weight (+1 BLE and perineal edema)     Discharge Planning:    Continue Oral Nutrition Supplement, Continue current diet     Electronically signed by Gilson De La Fuente RD, LD on 5/25/21 at 12:16 PM EDT    Contact: [phone]

## 2021-05-25 NOTE — CONSULTS
PHARMACY VANCOMYCIN MONITORING SERVICE    Mohan Vick is a 55 y.o. 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 Pharmacy consulted by  [facility] for monitoring and adjustment. Indication for treatment: Irene's gangrene and malfunctioning wound vac  Goal trough: 15 mcg/mL / 400-600 AUC/PAMELA  Other antimicrobials: Cefazolin given in the ED    Ht Readings from Last 1 Encounters:   05/24/21 5' 9\" (1.753 m)     Wt Readings from Last 3 Encounters:   05/24/21 274 lb (124.3 kg)   05/21/21 298 lb 14.4 oz (135.6 kg)   04/04/21 260 lb 4.8 oz (118.1 kg)        Pertinent Laboratory Values:   Temp Readings from Last 3 Encounters:   05/24/21 99.4 °F (37.4 °C) (Oral)   05/23/21 98.5 °F (36.9 °C) (Oral)   05/21/21 98 °F (36.7 °C) (Oral)     Recent Labs     05/24/21  1526   WBC 9.6     Recent Labs     05/24/21  1526   BUN 31*   CREATININE 2.3*     Estimated Creatinine Clearance: 52 mL/min (A) (based on SCr of 2.3 mg/dL (H)). No intake or output data in the 24 hours ending 05/24/21 2054    Pertinent Cultures:  Date Source Results   5/24/21 Blood Collected   5/15/21  3/24/21 Perineum Wound  Foot Wound Staph epidermidis (pen sensitive)  Staph aureus (pan sensitive)      Previous vancomycin levels:  TROUGH:  No results for input(s): VANCOTROUGH in the last 72 hours. RANDOM:  No results for input(s): VANCORANDOM in the last 72 hours. Assessment:  WBC and temperature: No leukocytosis with 24-hr TMax 99.4F  SCr, BUN, and urine output: SCr elevated with history of CARMEN on prior admission  Day(s) of therapy: #1 (this admission); patient was admitted on 5/13/21 and received 9-days of vancomycin earlier this month. Vancomycin concentration: Pending    Plan:  Patient has significant recent use of vancomycin between 5/13/21 to 5/21/21 on previous admission. Patient is being re-admitted to [address] [place] [address] due to malfunctioning wound vac.  On previous admission, it was discussed to start Dalbavancin 1500 mg x 1 at discharge via infusion center. Unsure if patient received. Vancomycin has historically been dosed per levels due to fluctuating renal function and CKD. Vancomycin 1,750mg was given in the ED prior to inpatient admission  The vancomycin will initially be dosed per levels due to fluctuating renal function. Pharmacy will continue to monitor patient and adjust therapy as indicated    [address] 5/25/21 @ 0600    Thank you for the consult and the opportunity to serve your patient.   Mia Harvey PharmD, BCPS  5/24/2021 8:54 PM

## 2021-05-26 LAB
ANION GAP SERPL CALCULATED.3IONS-SCNC: 9 MMOL/L (ref 4–16)
BUN BLDV-MCNC: 33 MG/DL (ref 6–23)
CALCIUM SERPL-MCNC: 7.7 MG/DL (ref 8.3–10.6)
CHLORIDE BLD-SCNC: 105 MMOL/L (ref 99–110)
CO2: 21 MMOL/L (ref 21–32)
CREAT SERPL-MCNC: 2.3 MG/DL (ref 0.9–1.3)
GFR AFRICAN AMERICAN: 37 ML/MIN/1.73M2
GFR NON-AFRICAN AMERICAN: 31 ML/MIN/1.73M2
GLUCOSE BLD-MCNC: 165 MG/DL (ref 70–99)
GLUCOSE BLD-MCNC: 180 MG/DL (ref 70–99)
GLUCOSE BLD-MCNC: 203 MG/DL (ref 70–99)
GLUCOSE BLD-MCNC: 217 MG/DL (ref 70–99)
GLUCOSE BLD-MCNC: 260 MG/DL (ref 70–99)
HCT VFR BLD CALC: 25 % (ref 42–52)
HEMOGLOBIN: 7.7 GM/DL (ref 13.5–18)
MCH RBC QN AUTO: 28 PG (ref 27–31)
MCHC RBC AUTO-ENTMCNC: 30.8 % (ref 32–36)
MCV RBC AUTO: 90.9 FL (ref 78–100)
PDW BLD-RTO: 13.2 % (ref 11.7–14.9)
PLATELET # BLD: 339 K/CU MM (ref 140–440)
PMV BLD AUTO: 9.2 FL (ref 7.5–11.1)
POTASSIUM SERPL-SCNC: 4.4 MMOL/L (ref 3.5–5.1)
RBC # BLD: 2.75 M/CU MM (ref 4.6–6.2)
SODIUM BLD-SCNC: 135 MMOL/L (ref 135–145)
WBC # BLD: 7.7 K/CU MM (ref 4–10.5)

## 2021-05-26 PROCEDURE — 6360000002 HC RX W HCPCS: Performed by: FAMILY MEDICINE

## 2021-05-26 PROCEDURE — 6370000000 HC RX 637 (ALT 250 FOR IP): Performed by: FAMILY MEDICINE

## 2021-05-26 PROCEDURE — 36415 COLL VENOUS BLD VENIPUNCTURE: CPT

## 2021-05-26 PROCEDURE — 2580000003 HC RX 258: Performed by: FAMILY MEDICINE

## 2021-05-26 PROCEDURE — 80048 BASIC METABOLIC PNL TOTAL CA: CPT

## 2021-05-26 PROCEDURE — 85027 COMPLETE CBC AUTOMATED: CPT

## 2021-05-26 PROCEDURE — 82962 GLUCOSE BLOOD TEST: CPT

## 2021-05-26 PROCEDURE — 94761 N-INVAS EAR/PLS OXIMETRY MLT: CPT

## 2021-05-26 PROCEDURE — 1200000000 HC SEMI PRIVATE

## 2021-05-26 PROCEDURE — 99232 SBSQ HOSP IP/OBS MODERATE 35: CPT | Performed by: SURGERY

## 2021-05-26 RX ORDER — LORAZEPAM 1 MG/1
1 TABLET ORAL EVERY 8 HOURS PRN
Status: DISCONTINUED | OUTPATIENT
Start: 2021-05-26 | End: 2021-05-28 | Stop reason: HOSPADM

## 2021-05-26 RX ADMIN — ONDANSETRON 4 MG: 2 INJECTION INTRAMUSCULAR; INTRAVENOUS at 17:44

## 2021-05-26 RX ADMIN — METRONIDAZOLE 500 MG: 250 TABLET ORAL at 12:50

## 2021-05-26 RX ADMIN — INSULIN GLARGINE 19 UNITS: 100 INJECTION, SOLUTION SUBCUTANEOUS at 21:06

## 2021-05-26 RX ADMIN — MORPHINE SULFATE 2 MG: 2 INJECTION, SOLUTION INTRAMUSCULAR; INTRAVENOUS at 03:10

## 2021-05-26 RX ADMIN — SODIUM CHLORIDE, PRESERVATIVE FREE 10 ML: 5 INJECTION INTRAVENOUS at 21:05

## 2021-05-26 RX ADMIN — SODIUM CHLORIDE: 9 INJECTION, SOLUTION INTRAVENOUS at 14:31

## 2021-05-26 RX ADMIN — TAMSULOSIN HYDROCHLORIDE 0.4 MG: 0.4 CAPSULE ORAL at 08:59

## 2021-05-26 RX ADMIN — OXYCODONE HYDROCHLORIDE AND ACETAMINOPHEN 1 TABLET: 5; 325 TABLET ORAL at 21:04

## 2021-05-26 RX ADMIN — METRONIDAZOLE 500 MG: 250 TABLET ORAL at 21:05

## 2021-05-26 RX ADMIN — ASPIRIN 81 MG: 81 TABLET, COATED ORAL at 08:59

## 2021-05-26 RX ADMIN — SODIUM CHLORIDE, PRESERVATIVE FREE 10 ML: 5 INJECTION INTRAVENOUS at 09:00

## 2021-05-26 RX ADMIN — OXYCODONE HYDROCHLORIDE AND ACETAMINOPHEN 1 TABLET: 5; 325 TABLET ORAL at 05:58

## 2021-05-26 RX ADMIN — HEPARIN SODIUM 5000 UNITS: 5000 INJECTION INTRAVENOUS; SUBCUTANEOUS at 12:50

## 2021-05-26 RX ADMIN — OXYCODONE HYDROCHLORIDE AND ACETAMINOPHEN 1 TABLET: 5; 325 TABLET ORAL at 12:50

## 2021-05-26 RX ADMIN — HEPARIN SODIUM 5000 UNITS: 5000 INJECTION INTRAVENOUS; SUBCUTANEOUS at 05:49

## 2021-05-26 RX ADMIN — METRONIDAZOLE 500 MG: 250 TABLET ORAL at 05:47

## 2021-05-26 RX ADMIN — HEPARIN SODIUM 5000 UNITS: 5000 INJECTION INTRAVENOUS; SUBCUTANEOUS at 21:06

## 2021-05-26 RX ADMIN — SERTRALINE HYDROCHLORIDE 50 MG: 50 TABLET ORAL at 08:59

## 2021-05-26 RX ADMIN — VANCOMYCIN HYDROCHLORIDE 1500 MG: 5 INJECTION, POWDER, LYOPHILIZED, FOR SOLUTION INTRAVENOUS at 05:59

## 2021-05-26 ASSESSMENT — ENCOUNTER SYMPTOMS
EYES NEGATIVE: 1
GASTROINTESTINAL NEGATIVE: 1
RESPIRATORY NEGATIVE: 1
ALLERGIC/IMMUNOLOGIC NEGATIVE: 1

## 2021-05-26 ASSESSMENT — PAIN SCALES - GENERAL
PAINLEVEL_OUTOF10: 8
PAINLEVEL_OUTOF10: 8
PAINLEVEL_OUTOF10: 7
PAINLEVEL_OUTOF10: 7
PAINLEVEL_OUTOF10: 8

## 2021-05-26 ASSESSMENT — PAIN DESCRIPTION - PAIN TYPE: TYPE: ACUTE PAIN

## 2021-05-26 ASSESSMENT — PAIN DESCRIPTION - LOCATION: LOCATION: SCROTUM

## 2021-05-26 NOTE — PROGRESS NOTES
TEMPERATURE:  Current -Temp: 98.9 °F (37.2 °C); Max - Temp  Av.7 °F (37.1 °C)  Min: 97.2 °F (36.2 °C)  Max: 100.1 °F (37.8 °C)    I/O this shift:  In: -   Out: 1200 [Urine:1200]I/O last 3 completed shifts: In: 56 [P.O.:480; I.V.:10]  Out: 1950 [Urine:1950]      Physical Exam:  Physical Exam  Vitals reviewed. Constitutional:       General: He is not in acute distress. Appearance: He is not ill-appearing, toxic-appearing or diaphoretic. HENT:      Head: Normocephalic and atraumatic. Right Ear: External ear normal.      Left Ear: External ear normal.      Nose: Nose normal.   Eyes:      General:         Right eye: No discharge. Left eye: No discharge. Extraocular Movements: Extraocular movements intact. Cardiovascular:      Rate and Rhythm: Normal rate. Pulmonary:      Effort: No respiratory distress. Abdominal:      Palpations: Abdomen is soft. Genitourinary:     Comments: Wound vac in place, good seal, min and appropriately TTP, light serous drainage, no warmth or redness  Musculoskeletal:         General: No swelling. Cervical back: Normal range of motion. Skin:     General: Skin is warm. Neurological:      General: No focal deficit present. Mental Status: He is alert.    Psychiatric:         Mood and Affect: Mood normal.           Scheduled Meds:   sertraline  50 mg Oral Daily    insulin lispro  0-18 Units Subcutaneous TID WC    insulin lispro  0-9 Units Subcutaneous Nightly    vancomycin  1,500 mg Intravenous Q24H    aspirin  81 mg Oral Daily    tamsulosin  0.4 mg Oral Daily    metroNIDAZOLE  500 mg Oral TID    sodium chloride flush  5-40 mL Intravenous 2 times per day    heparin (porcine)  5,000 Units Subcutaneous 3 times per day    insulin glargine  0.15 Units/kg Subcutaneous Nightly    insulin lispro  0.05 Units/kg Subcutaneous TID WC    vancomycin (VANCOCIN) intermittent dosing (placeholder)   Other See Admin Instructions ContinuousInfusions:   dextrose      sodium chloride 125 mL/hr at 05/26/21 0159    sodium chloride       PRN Meds:LORazepam, morphine, glucose, dextrose, glucagon (rDNA), dextrose, ondansetron, oxyCODONE-acetaminophen, sodium chloride flush, sodium chloride, potassium chloride **OR** potassium alternative oral replacement **OR** potassium chloride, magnesium sulfate, promethazine **OR** ondansetron, polyethylene glycol, fleet, acetaminophen **OR** acetaminophen      Labs/Imaging Results:   Lab Results   Component Value Date    WBC 7.7 05/26/2021    HGB 7.7 (L) 05/26/2021    HCT 25.0 (L) 05/26/2021    MCV 90.9 05/26/2021     05/26/2021     Lab Results   Component Value Date     05/26/2021    K 4.4 05/26/2021     05/26/2021    CO2 21 05/26/2021    BUN 33 (H) 05/26/2021    CREATININE 2.3 (H) 05/26/2021    GLUCOSE 180 (H) 05/26/2021    CALCIUM 7.7 (L) 05/26/2021    PROT 6.3 (L) 05/24/2021    LABALBU 2.7 (L) 05/24/2021    BILITOT 0.2 05/24/2021    ALKPHOS 79 05/24/2021    AST 14 (L) 05/24/2021    ALT 12 05/24/2021    LABGLOM 31 (L) 05/26/2021    GFRAA 37 (L) 05/26/2021       Assessment:       54 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 -Appreciate  and Wound Team eval.    -Still without any need for additional debridement at this time. -Given location of the wound and issus with keeping seal on wound vac, pt may benefit from wet-to-dry dressing changes instead of wound vac at d/c.    -Will continue to follow while pt is admitted. Ultimately, pt may require eval by PRS for skin graft given location.      Electronically signed by [address] [place] [address] CUCA Grier MD on 5/26/2021 at 9:58 AM

## 2021-05-26 NOTE — PROGRESS NOTES
[id number]  consulted by Dr. Coretta Thomas for monitoring and adjustment. Indication for treatment: Irene's gangrene   Goal trough: 10-15 mcg/mL / AUC <500     Ht Readings from Last 1 Encounters:   05/25/21 5' 9\" (1.753 m)     Wt Readings from Last 3 Encounters:   05/21/21 298 lb 14.4 oz (135.6 kg)   04/04/21 260 lb 4.8 oz (118.1 kg)   03/26/21 274 lb 12.8 oz (124.6 kg)      Pertinent Laboratory Values:   Temp Readings from Last 3 Encounters:   05/26/21 97.9 °F (36.6 °C) (Oral)   05/23/21 98.5 °F (36.9 °C) (Oral)   05/21/21 98 °F (36.7 °C) (Oral)     Recent Labs     05/24/21  1526 05/25/21  0058 05/26/21  0458   WBC 9.6 10.6* 7.7     Recent Labs     05/24/21  1526 05/25/21  0058 05/26/21  0458   BUN 31* 31* 33*   CREATININE 2.3* 2.3* 2.3*     Estimated Creatinine Clearance: 52 mL/min (A) (based on SCr of 2.3 mg/dL (H)). Intake/Output Summary (Last 24 hours) at 5/26/2021 1301  Last data filed at 5/26/2021 0824  Gross per 24 hour   Intake 250 ml   Output 2050 ml   Net -1800 ml       Pertinent Cultures:  Date Source Results   5/24/21 Blood Collected   5/15/21  3/24/21 Perineum Wound  Foot Wound Staph epidermidis (pen sensitive)  Staph aureus (pan sensitive)      Previous vancomycin levels:  TROUGH:  No results for input(s): VANCOTROUGH in the last 72 hours.   RANDOM:    Recent Labs     05/25/21  0058   VANCORANDOM 28.4     Assessment:   · WBC and temperature: WBC stable, afebrile   · SCr, BUN, and urine output: CKD, stable   · Day(s) of therapy: #3 resumed  · Vancomycin concentration: to be collected    Plan:  · Previously on vancomycin 1500 mg q24h   · Discharged, to receive dalbavancin infusion as outpatient - unclear if received   · Resumed 1500 mg q24h   · Trough tomorrow   · Pharmacy will continue to monitor patient and adjust therapy as indicated    Thank you for the consult,  Kathi Newton, PharmD, BCPS  5/26/2021 1:01 PM

## 2021-05-26 NOTE — PROGRESS NOTES
visually estimated at 55-60%. Mild left ventricular hypertrophy. Left Atrium  Essentially normal left atrium. Right Atrium  Essentially normal right atrium. Right Ventricle  Mildly dilated right ventricle. Aortic Valve  Sclerotic, but non-stenotic aortic valve. Trace aortic regurgitation. Mitral Valve  Trace mitral regurgitation. Tricuspid Valve  Trace tricuspid regurgitation; normal RVSP. Pulmonic Valve  The pulmonic valve was not well visualized. Pericardial Effusion  No evidence of any pericardial effusion. Pleural Effusion  No evidence of pleural effusion.   M-Mode/2D Measurements & Calculations   LV Diastolic Dimension:  LV Systolic Dimension:  LA Dimension: 3.8 cmAO Root  3.75 cm                  2.85 cm                 Dimension: 3 cmLA Area:  LV FS:24 %               LV Volume Diastolic: 84 03.8 cm2  LV PW Diastolic: 3.18 cm ml  LV PW Systolic: 0.20 cm  LV Volume Systolic: 40  Septum Diastolic: 3.56   ml  cm                       LV EDV/LV EDV Index: 84 RV Diastolic Dimension:  Septum Systolic: 7.86 cm TQ/66 I4WN ESV/LV ESV   4.21 cm  CO: 5.45 l/min           Index: 40 ml/17 m2  CI: 2.36 l/m*m2          EF Calculated (A4C):    LA/Aorta: 1.27                           52.4 %  LV Area Diastolic: 59.8  EF Calculated (2D):     LA volume/Index: 48 ml  cm2                      48.5 %                  /25X0  LV Area Systolic: 17 cm2                           LV Length: 6.41 cm                            LVOT: 2.1 cm  Doppler Measurements & Calculations   MV Peak E-Wave: 89.3  AV Peak Velocity: 156 cm/s   LVOT Peak Velocity: 105  cm/s                  AV Peak Gradient: 9.73 mmHg  cm/s  MV Peak A-Wave: 71.1  AV Mean Velocity: 108 cm/s   LVOT Mean Velocity: 73.3  cm/s                  AV Mean Gradient: 6 mmHg     cm/s  MV E/A Ratio: 1.26    AV VTI: 23.5 cm              LVOT Peak Gradient: 4  MV Peak Gradient:     AV Area (Continuity):2.42    mmHgLVOT Mean Gradient: 3  3.19 mmHg             cm2 mmHg                                                     Estimated RVSP: 20 mmHg  MV P1/2t: 32 msec     LVOT VTI: 16.4 cm            Estimated RAP:3 mmHg  MVA by PHT:6.88 cm2                        Estimated PASP: 12.73 mmHg  MV E' Septal                                       TR Velocity:156 cm/s  Velocity: 9.87 cm/s                                TR Gradient:9.73 mmHg  MV E' Lateral  Velocity: 8.99 cm/s  MV E/E' septal: 9.05  MV E/E' lateral: 9.93      CT PELVIS WO CONTRAST Additional Contrast? None    Result Date: 5/15/2021  EXAMINATION: CT OF THE PELVIS WITHOUT CONTRAST 5/15/2021 10:57 am TECHNIQUE: CT of the pelvis was performed without the administration of intravenous contrast.  Multiplanar reformatted images are provided for review. Dose modulation, iterative reconstruction, and/or weight based adjustment of the mA/kV was utilized to reduce the radiation dose to as low as reasonably achievable. COMPARISON: Scrotal ultrasound 05/13/2021, CT pelvis 01/10/2019. HISTORY: ORDERING SYSTEM PROVIDED HISTORY: scrotal cellulitis, rule out perineal and scrotal abscess TECHNOLOGIST PROVIDED HISTORY: Reason for exam:->scrotal cellulitis, rule out perineal and scrotal abscess Additional Contrast?->None Reason for Exam: scrotal cellulitis, rule out perineal and scrotal abscess Acuity: Acute Type of Exam: Initial FINDINGS: There is a large amount of stranding throughout the perineum which extends into the scrotum. There is associated soft tissue gas. 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 No abscess, although there is extensive scrotal edema. The urinary bladder is nondistended. There are mildly prominent bilateral inguinal lymph nodes, measuring up to 1.4 cm, which are most likely reactive. Visualized bowel loops in the pelvis are unremarkable. The appendix is normal. No suspicious osteolytic or osteoblastic lesions are demonstrated.      1. Stranding and gas within the perineum and base of scrotum. 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 Surgical consultation is recommended. 2. Extensive scrotal edema. 3. No abscess. Results of this examination were verbally communicated to the patient's nurse, Perlita Moran, at 1:16 p.m. on 05/15/2021 by the [facility]. US SCROTUM AND TESTICLES    Result Date: 5/13/2021  EXAMINATION: ULTRASOUND OF THE SCROTUM/TESTICLES WITH COLOR DOPPLER FLOW EVALUATION; DOPPLER EVALUATION OF THE PELVIS 5/13/2021 COMPARISON: None. HISTORY: ORDERING SYSTEM PROVIDED HISTORY: Right-sided testicular swelling TECHNOLOGIST PROVIDED HISTORY: Reason for exam:->Right-sided testicular swelling Reason for Exam: Rt pain and swelling Acuity: Acute Type of Exam: Initial; ORDERING SYSTEM PROVIDED HISTORY: Rt swelling TECHNOLOGIST PROVIDED HISTORY: Reason for exam:->Rt swelling Reason for Exam: Rt pain and swelling Acuity: Acute Type of Exam: Initial FINDINGS: Measurements: Right testicle: 2.9 x 3.1 x 3.7 cm Left testicle: 2.5 x 3.0 x 3.8 cm Right: Grey scale: The right testicle demonstrates normal homogeneous echotexture without focal lesion. No evidence of testicular microlithiasis. Doppler Evaluation:  There is normal arterial and venous Doppler flow within the testicle. Scrotal Sac:  No evidence of hydrocele. Scrotal wall edema is noted. Epididymis: The epididymis is enlarged and heterogeneous but does not demonstrate increased flow. Left: Grey scale: The left testicle demonstrates normal homogeneous echotexture without focal lesion. No evidence of testicular microlithiasis. Doppler Evaluation:  There is normal arterial and venous Doppler flow within the testicle. Scrotal Sac: There is a trace hydrocele. Scrotal wall edema is noted. Epididymis: The left epididymis could not be identified. 1. Normal flow in both testicles. 2. Appearance of the right epididymis suggests prior epididymitis.      CT ABDOMEN PELVIS W IV CONTRAST Additional Contrast? None    Result Date: 5/24/2021  EXAMINATION: CT OF THE ABDOMEN AND PELVIS WITH CONTRAST 5/24/2021 5:15 pm TECHNIQUE: CT of the abdomen and pelvis was performed with the administration of intravenous contrast. Multiplanar reformatted images are provided for review. Dose modulation, iterative reconstruction, and/or weight based adjustment of the mA/kV was utilized to reduce the radiation dose to as low as reasonably achievable. COMPARISON: 05/15/2021 HISTORY: ORDERING SYSTEM PROVIDED HISTORY: hx of fourniers gangrene right testicle. worsening symptoms TECHNOLOGIST PROVIDED HISTORY: Reason for exam:->hx of fourniers gangrene right testicle. worsening symptoms Additional Contrast?->None Decision Support Exception - unselect if not a suspected or confirmed emergency medical condition->Emergency Medical Condition (MA) Reason for Exam: hx of fourniers gangrene right testicle. worsening symptoms FINDINGS: Lower Chest: No focal infiltrate. Organs: The liver, gallbladder, spleen, pancreas and adrenal glands are unremarkable. Both kidneys are functional and there is no hydronephrosis. GI/Bowel: No bowel obstruction. The appendix is normal. Pelvis: Urinary bladder is unremarkable. Peritoneum/Retroperitoneum: The abdominal aorta is atherosclerotic, but non aneurysmal.  Small retroperitoneal lymph nodes measuring up to 8 mm in the left para-aortic region are similar in size to 2019, and therefore most likely reactive. There is a small fat containing umbilical hernia. Bones/Soft Tissues: There is mildly decreased, but ongoing gas and stranding within the soft tissues of the perineum and scrotum, primarily on the right. Findings are consistent with necrotizing fasciitis/Irene's gangrene. Previously demonstrated hydroceles are smaller. The right testicle appears to be more proximal within the right scrotum.   More distally within the right scrotum is a 2.0 x 1.5 cm abscess which contains fluid and gas. A 1.4 cm, likely reactive right inguinal lymph node is stable. No acute osseous abnormality is seen. 1. Mildly improved, but persistent stranding and gas within the perineum and scrotum. Findings remain consistent with necrotizing fasciitis/Irene gangrene. 2. 2 cm right scrotal abscess. 3. Near complete resolution of the hydroceles, which were large on the prior exam. Results of this examination were discussed with Dr. Jovany Infante at 5:33 p.m. on 05/24/2021. US DUP ABD PEL RETRO SCROT COMPLETE    Result Date: 5/13/2021  EXAMINATION: ULTRASOUND OF THE SCROTUM/TESTICLES WITH COLOR DOPPLER FLOW EVALUATION; DOPPLER EVALUATION OF THE PELVIS 5/13/2021 COMPARISON: None. HISTORY: ORDERING SYSTEM PROVIDED HISTORY: Right-sided testicular swelling TECHNOLOGIST PROVIDED HISTORY: Reason for exam:->Right-sided testicular swelling Reason for Exam: Rt pain and swelling Acuity: Acute Type of Exam: Initial; ORDERING SYSTEM PROVIDED HISTORY: Rt swelling TECHNOLOGIST PROVIDED HISTORY: Reason for exam:->Rt swelling Reason for Exam: Rt pain and swelling Acuity: Acute Type of Exam: Initial FINDINGS: Measurements: Right testicle: 2.9 x 3.1 x 3.7 cm Left testicle: 2.5 x 3.0 x 3.8 cm Right: Grey scale: The right testicle demonstrates normal homogeneous echotexture without focal lesion. No evidence of testicular microlithiasis. Doppler Evaluation:  There is normal arterial and venous Doppler flow within the testicle. Scrotal Sac:  No evidence of hydrocele. Scrotal wall edema is noted. Epididymis: The epididymis is enlarged and heterogeneous but does not demonstrate increased flow. Left: Grey scale: The left testicle demonstrates normal homogeneous echotexture without focal lesion. No evidence of testicular microlithiasis. Doppler Evaluation:  There is normal arterial and venous Doppler flow within the testicle. Scrotal Sac: There is a trace hydrocele. Scrotal wall edema is noted. Epididymis:   The left epididymis could not be identified. 1. Normal flow in both testicles. 2. Appearance of the right epididymis suggests prior epididymitis. Assessment & Plan:      Barrington Mireles is a 55y.o. year old male admitted 5/24/2021 for scrotal cellulitis.    1) Irene's gangrene              POD #11 Scrotal Exploration with Scrotal and Perineal Debridement with Dr. Nasreen Will and Dr. Gilmore Course              POD #10 repeat scrotal/perineal debridement              QK a/p w 5/24/21: Mildly improved, but persistent stranding and gas within the perineum and scrotum. Findings remain consistent with necrotizing fasciitis/Irene gangrene. 2 cm right scrotal abscess. CT Pelvis 5/15/21: Stranding and gas within the perineum and base of scrotum. Findings are most compatible with necrotizing fasciitis/Irene gangrene              Urine culture Negative; Blood Cultures 5/13/21 Negative x2              Surgical Culture 5/15/21 +Diphtheroids, Staph epidermidis and Clostridium innocuum              WBC 7.7, afebrile              On IV Vanco and Flagyl; outpatient abx therapy per ID recs              Continue Flomax due to urinary hesitancy; perform bladder scan if pt unable to void and notify us if >300cc. General surgery on board, appreciate recs. No further debridement indicated at this time from our standpoint. Will monitor CT finding of scrotal abscess. Pt would likely benefit from wet-to-dry dressing if wound vac issues persist. Recommend further evaluation from wound care team.      Pt stable from a  standpoint. Will sign off, please call with any questions. Pt to follow up in our office with Dr. Nasreen Will in 1 week for reevaluation. Patient seen and examined, chart reviewed.      Electronically signed by Kadie Bonds PA-C on 5/26/2021 at 7:28 AM

## 2021-05-26 NOTE — PROGRESS NOTES
Attending Progress Note      PCP: Abiola Truong MD      Patient: Clint Norris   Gender: male  : 1975   Age: 55 y.o.   MRN: [id number]  Room  : -A      Date of Admission: 2021    Chief Complaint   Patient presents with    Hypertension     180/82    Wound Check     Unable to get wound vac on by home health nurse           Subjective: groin pain , depressed         Medications:  Reviewed  Infusion Medications    dextrose      sodium chloride 75 mL/hr at 21 1420    sodium chloride       Scheduled Medications    insulin lispro  0-18 Units Subcutaneous TID WC    insulin lispro  0-9 Units Subcutaneous Nightly    [START ON 2021] vancomycin  1,500 mg Intravenous Q24H    aspirin  81 mg Oral Daily    tamsulosin  0.4 mg Oral Daily    metroNIDAZOLE  500 mg Oral TID    sodium chloride flush  5-40 mL Intravenous 2 times per day    heparin (porcine)  5,000 Units Subcutaneous 3 times per day    insulin glargine  0.15 Units/kg Subcutaneous Nightly    insulin lispro  0.05 Units/kg Subcutaneous TID WC    vancomycin (VANCOCIN) intermittent dosing (placeholder)   Other See Admin Instructions     PRN Meds: morphine, glucose, dextrose, glucagon (rDNA), dextrose, ondansetron, oxyCODONE-acetaminophen, sodium chloride flush, sodium chloride, potassium chloride **OR** potassium alternative oral replacement **OR** potassium chloride, magnesium sulfate, promethazine **OR** ondansetron, polyethylene glycol, fleet, acetaminophen **OR** acetaminophen      Intake/Output Summary (Last 24 hours) at 2021 2346  Last data filed at 2021 2144  Gross per 24 hour   Intake 490 ml   Output 1950 ml   Net -1460 ml       Exam:  /80   Pulse 92   Temp 99.5 °F (37.5 °C) (Oral)   Resp 16   Ht 5' 9\" (1.753 m)   Wt 274 lb (124.3 kg)   SpO2 97%   BMI 40.46 kg/m²   General appearance: No distress,   Respiratory:  good air entry , no Rales , No wheezing, or rhonchi,  Cardiovascular: RRR, with normal S1/S2 . Abdomen : Soft, non-tender, non-distended  , normal bowel sounds. Legs : No edema bilaterally. No DVT signs ,    Neurologic:  Alert and oriented ,        Labs:   Recent Labs     05/24/21  1526 05/25/21  0058   WBC 9.6 10.6*   HGB 9.1* 8.2*   HCT 28.1* 26.1*    365     Recent Labs     05/24/21  1526 05/25/21  0058   * 133*   K 4.3 3.9    102   CO2 20* 20*   BUN 31* 31*   CREATININE 2.3* 2.3*   CALCIUM 7.7* 7.7*     Recent Labs     05/24/21  1526   AST 14*   ALT 12   BILITOT 0.2   ALKPHOS 79     No results for input(s): INR in the last 72 hours. No results for input(s): María Lewiston in the last 72 hours. Assessment/Plan:  Active Hospital Problems    Diagnosis Date Noted    Necrotizing fasciitis ([facility] [address] [M72.6] 05/24/2021   DM II  Depression   ARF on CKD      Plan:  Tele : no event - no fever - on room air   Increase IVF . Ian Rich monitor renal function   IV ABX   Wound care   Add Zoloft   DVT Prophylaxis   Diet: DIET CARB CONTROL; Carb Control: 5 carb choices (75 gms)/meal  Dietary Nutrition Supplements: Wound Healing Oral Supplement  Code Status: Full Code        [unfilled]    Treatment progress and plan was d/w pt/family .     MD sharmaine Cyr

## 2021-05-26 NOTE — PROGRESS NOTES
Attending Progress Note      PCP: Cole Gomez MD      Patient: Alejandro Serrano   Gender: male  : 1975   Age: 55 y.o. MRN: [id number]  Room  : A      Date of Admission: 2021    Chief Complaint   Patient presents with    Hypertension     180/82    Wound Check     Unable to get wound vac on by home health nurse           Subjective: groin pain , depressed         Medications:  Reviewed  Infusion Medications    dextrose      sodium chloride 125 mL/hr at 21 1431    sodium chloride       Scheduled Medications    sertraline  50 mg Oral Daily    insulin lispro  0-18 Units Subcutaneous TID WC    insulin lispro  0-9 Units Subcutaneous Nightly    vancomycin  1,500 mg Intravenous Q24H    aspirin  81 mg Oral Daily    tamsulosin  0.4 mg Oral Daily    metroNIDAZOLE  500 mg Oral TID    sodium chloride flush  5-40 mL Intravenous 2 times per day    heparin (porcine)  5,000 Units Subcutaneous 3 times per day    insulin glargine  0.15 Units/kg Subcutaneous Nightly    insulin lispro  0.05 Units/kg Subcutaneous TID WC     PRN Meds: LORazepam, morphine, glucose, dextrose, glucagon (rDNA), dextrose, ondansetron, oxyCODONE-acetaminophen, sodium chloride flush, sodium chloride, potassium chloride **OR** potassium alternative oral replacement **OR** potassium chloride, magnesium sulfate, promethazine **OR** ondansetron, polyethylene glycol, fleet, acetaminophen **OR** acetaminophen      Intake/Output Summary (Last 24 hours) at 2021 1901  Last data filed at 2021 1750  Gross per 24 hour   Intake 2490 ml   Output 2200 ml   Net 290 ml       Exam:  /76   Pulse 87   Temp 98 °F (36.7 °C) (Oral)   Resp 20   Ht 5' 9\" (1.753 m)   Wt 274 lb (124.3 kg)   SpO2 98%   BMI 40.46 kg/m²   General appearance: No distress,   Respiratory:  good air entry , no Rales , No wheezing, or rhonchi,  Cardiovascular: RRR, with normal S1/S2 .   Abdomen : Soft, non-tender, non-distended  , normal bowel sounds. Legs : No edema bilaterally. No DVT signs ,    Neurologic:  Alert and oriented ,        Labs:   Recent Labs     05/24/21  1526 05/25/21  0058 05/26/21  0458   WBC 9.6 10.6* 7.7   HGB 9.1* 8.2* 7.7*   HCT 28.1* 26.1* 25.0*    365 339     Recent Labs     05/24/21  1526 05/25/21  0058 05/26/21  0458   * 133* 135   K 4.3 3.9 4.4    102 105   CO2 20* 20* 21   BUN 31* 31* 33*   CREATININE 2.3* 2.3* 2.3*   CALCIUM 7.7* 7.7* 7.7*     Recent Labs     05/24/21  1526   AST 14*   ALT 12   BILITOT 0.2   ALKPHOS 79     No results for input(s): INR in the last 72 hours. No results for input(s): Burnice Hannah in the last 72 hours. Assessment/Plan:  Active Hospital Problems    Diagnosis Date Noted    Necrotizing fasciitis ([facility] [address] [M72.6] 05/24/2021   DM II  Depression   ARF on CKD      Plan:  Tele : no event - no fever - on room air   Increase IVF Joe Myrick monitor renal function   Consultants input noted   IV ABX   Wound care    Zoloft   DVT Prophylaxis   Diet: DIET CARB CONTROL; Carb Control: 5 carb choices (75 gms)/meal  Dietary Nutrition Supplements: Wound Healing Oral Supplement  Code Status: Full Code        [unfilled]    Treatment progress and plan was d/w pt/family .     MD sharmaine Brown

## 2021-05-27 LAB
ANION GAP SERPL CALCULATED.3IONS-SCNC: 8 MMOL/L (ref 4–16)
BUN BLDV-MCNC: 25 MG/DL (ref 6–23)
CALCIUM SERPL-MCNC: 7.8 MG/DL (ref 8.3–10.6)
CHLORIDE BLD-SCNC: 103 MMOL/L (ref 99–110)
CO2: 21 MMOL/L (ref 21–32)
CREAT SERPL-MCNC: 1.8 MG/DL (ref 0.9–1.3)
GFR AFRICAN AMERICAN: 49 ML/MIN/1.73M2
GFR NON-AFRICAN AMERICAN: 41 ML/MIN/1.73M2
GLUCOSE BLD-MCNC: 168 MG/DL (ref 70–99)
GLUCOSE BLD-MCNC: 230 MG/DL (ref 70–99)
GLUCOSE BLD-MCNC: 231 MG/DL (ref 70–99)
GLUCOSE BLD-MCNC: 234 MG/DL (ref 70–99)
GLUCOSE BLD-MCNC: 239 MG/DL (ref 70–99)
HCT VFR BLD CALC: 24.8 % (ref 42–52)
HEMOGLOBIN: 8 GM/DL (ref 13.5–18)
MCH RBC QN AUTO: 28.8 PG (ref 27–31)
MCHC RBC AUTO-ENTMCNC: 32.3 % (ref 32–36)
MCV RBC AUTO: 89.2 FL (ref 78–100)
PDW BLD-RTO: 12.9 % (ref 11.7–14.9)
PLATELET # BLD: 384 K/CU MM (ref 140–440)
PMV BLD AUTO: 9.2 FL (ref 7.5–11.1)
POTASSIUM SERPL-SCNC: 4.8 MMOL/L (ref 3.5–5.1)
RBC # BLD: 2.78 M/CU MM (ref 4.6–6.2)
SODIUM BLD-SCNC: 132 MMOL/L (ref 135–145)
WBC # BLD: 8.7 K/CU MM (ref 4–10.5)

## 2021-05-27 PROCEDURE — 80048 BASIC METABOLIC PNL TOTAL CA: CPT

## 2021-05-27 PROCEDURE — 6360000002 HC RX W HCPCS: Performed by: FAMILY MEDICINE

## 2021-05-27 PROCEDURE — 82962 GLUCOSE BLOOD TEST: CPT

## 2021-05-27 PROCEDURE — 85027 COMPLETE CBC AUTOMATED: CPT

## 2021-05-27 PROCEDURE — 6370000000 HC RX 637 (ALT 250 FOR IP): Performed by: FAMILY MEDICINE

## 2021-05-27 PROCEDURE — 2580000003 HC RX 258: Performed by: FAMILY MEDICINE

## 2021-05-27 PROCEDURE — 80202 ASSAY OF VANCOMYCIN: CPT

## 2021-05-27 PROCEDURE — 36415 COLL VENOUS BLD VENIPUNCTURE: CPT

## 2021-05-27 PROCEDURE — 1200000000 HC SEMI PRIVATE

## 2021-05-27 PROCEDURE — 94761 N-INVAS EAR/PLS OXIMETRY MLT: CPT

## 2021-05-27 RX ADMIN — LORAZEPAM 1 MG: 1 TABLET ORAL at 21:36

## 2021-05-27 RX ADMIN — METRONIDAZOLE 500 MG: 250 TABLET ORAL at 04:59

## 2021-05-27 RX ADMIN — SERTRALINE HYDROCHLORIDE 50 MG: 50 TABLET ORAL at 12:41

## 2021-05-27 RX ADMIN — TAMSULOSIN HYDROCHLORIDE 0.4 MG: 0.4 CAPSULE ORAL at 12:41

## 2021-05-27 RX ADMIN — ASPIRIN 81 MG: 81 TABLET, COATED ORAL at 12:41

## 2021-05-27 RX ADMIN — OXYCODONE HYDROCHLORIDE AND ACETAMINOPHEN 1 TABLET: 5; 325 TABLET ORAL at 04:59

## 2021-05-27 RX ADMIN — OXYCODONE HYDROCHLORIDE AND ACETAMINOPHEN 1 TABLET: 5; 325 TABLET ORAL at 12:40

## 2021-05-27 RX ADMIN — METRONIDAZOLE 500 MG: 250 TABLET ORAL at 14:57

## 2021-05-27 RX ADMIN — VANCOMYCIN HYDROCHLORIDE 1500 MG: 5 INJECTION, POWDER, LYOPHILIZED, FOR SOLUTION INTRAVENOUS at 06:41

## 2021-05-27 RX ADMIN — HEPARIN SODIUM 5000 UNITS: 5000 INJECTION INTRAVENOUS; SUBCUTANEOUS at 21:37

## 2021-05-27 RX ADMIN — OXYCODONE HYDROCHLORIDE AND ACETAMINOPHEN 1 TABLET: 5; 325 TABLET ORAL at 19:06

## 2021-05-27 RX ADMIN — SODIUM CHLORIDE: 9 INJECTION, SOLUTION INTRAVENOUS at 18:05

## 2021-05-27 RX ADMIN — ONDANSETRON 4 MG: 4 TABLET, ORALLY DISINTEGRATING ORAL at 09:31

## 2021-05-27 RX ADMIN — PROMETHAZINE HYDROCHLORIDE 12.5 MG: 25 TABLET ORAL at 04:59

## 2021-05-27 RX ADMIN — SODIUM CHLORIDE, PRESERVATIVE FREE 10 ML: 5 INJECTION INTRAVENOUS at 21:36

## 2021-05-27 RX ADMIN — METRONIDAZOLE 500 MG: 250 TABLET ORAL at 21:36

## 2021-05-27 RX ADMIN — INSULIN GLARGINE 19 UNITS: 100 INJECTION, SOLUTION SUBCUTANEOUS at 21:37

## 2021-05-27 RX ADMIN — HEPARIN SODIUM 5000 UNITS: 5000 INJECTION INTRAVENOUS; SUBCUTANEOUS at 05:00

## 2021-05-27 RX ADMIN — HEPARIN SODIUM 5000 UNITS: 5000 INJECTION INTRAVENOUS; SUBCUTANEOUS at 14:57

## 2021-05-27 ASSESSMENT — PAIN SCALES - GENERAL
PAINLEVEL_OUTOF10: 7
PAINLEVEL_OUTOF10: 7
PAINLEVEL_OUTOF10: 0
PAINLEVEL_OUTOF10: 0
PAINLEVEL_OUTOF10: 7

## 2021-05-27 NOTE — PLAN OF CARE
Problem: Pain:  Goal: Pain level will decrease  Description: Pain level will decrease  5/27/2021 1036 by Matt Ruiz RN  Outcome: Ongoing  5/26/2021 2120 by Karena Walters RN  Outcome: Ongoing  Goal: Control of acute pain  Description: Control of acute pain  5/27/2021 1036 by Matt Ruiz RN  Outcome: Ongoing  5/26/2021 2120 by Karena Walters RN  Outcome: Ongoing  Goal: Control of chronic pain  Description: Control of chronic pain  5/27/2021 1036 by Matt Ruiz RN  Outcome: Ongoing  5/26/2021 2120 by Karena Walters RN  Outcome: Ongoing     Problem: Skin Integrity:  Goal: Will show no infection signs and symptoms  Description: Will show no infection signs and symptoms  5/27/2021 1036 by Matt Ruiz RN  Outcome: Ongoing  5/26/2021 2120 by Karena Walters RN  Outcome: Ongoing  Goal: Absence of new skin breakdown  Description: Absence of new skin breakdown  5/27/2021 1036 by Matt Ruiz RN  Outcome: Ongoing  5/26/2021 2120 by Karena Walters RN  Outcome: Ongoing     Problem: Falls - Risk of:  Goal: Will remain free from falls  Description: Will remain free from falls  5/27/2021 1036 by Matt Ruiz RN  Outcome: Ongoing  5/26/2021 2120 by Karena Walters RN  Outcome: Ongoing  Goal: Absence of physical injury  Description: Absence of physical injury  5/27/2021 1036 by Matt Ruiz RN  Outcome: Ongoing  5/26/2021 2120 by Karena Walters RN  Outcome: Ongoing     Problem: Nutrition  Goal: Optimal nutrition therapy  5/27/2021 1036 by Matt Ruiz RN  Outcome: Ongoing  5/26/2021 2120 by Karena Walters RN  Outcome: Ongoing     Problem: Wound:  Goal: Will show signs of wound healing; wound closure and no evidence of infection  Description: Will show signs of wound healing; wound closure and no evidence of infection  Outcome: Ongoing     Problem: Pressure Ulcer:  Goal: Will show no infection signs and symptoms  Description: Will show no infection signs and symptoms  5/27/2021 1036 by Lisa Castañeda RN  Outcome: Ongoing  5/26/2021 2120 by Lexi Rebolledo RN  Outcome: Ongoing  Goal: Signs of wound healing will improve  Description: Signs of wound healing will improve  Outcome: Ongoing  Goal: Absence of new pressure ulcer  Description: Absence of new pressure ulcer  Outcome: Ongoing     Problem: Arterial:  Goal: Optimize blood flow for wound healing  Description: Optimize blood flow for wound healing  Outcome: Ongoing     Problem: Venous:  Goal: Signs of wound healing will improve  Description: Signs of wound healing will improve  Outcome: Ongoing     Problem: Smoking cessation:  Goal: Ability to formulate a plan to maintain a tobacco-free life will be supported  Description: Ability to formulate a plan to maintain a tobacco-free life will be supported  Outcome: Ongoing     Problem: Compression therapy:  Goal: Will be free from complications associated with compression therapy  Description: Will be free from complications associated with compression therapy  Outcome: Ongoing     Problem: Weight control:  Goal: Ability to maintain an optimal weight for height and age will be supported  Description: Ability to maintain an optimal weight for height and age will be supported  Outcome: Ongoing     Problem: Falls - Risk of:  Goal: Will remain free from falls  Description: Will remain free from falls  5/27/2021 1036 by Lisa Castañeda RN  Outcome: Ongoing  5/26/2021 2120 by Lexi Rebolledo RN  Outcome: Ongoing     Problem: Blood Glucose:  Goal: Ability to maintain appropriate glucose levels will improve  Description: Ability to maintain appropriate glucose levels will improve  Outcome: Ongoing     Problem: Discharge Planning:  Goal: Discharged to appropriate level of care  Description: Discharged to appropriate level of care  Outcome: Ongoing     Problem: Serum Glucose Level - Abnormal:  Goal: Ability to maintain appropriate glucose levels will improve  Description: Ability to maintain appropriate glucose levels will improve  Outcome: Ongoing     Problem: Sensory Perception - Impaired:  Goal: Ability to maintain a stable neurologic state will improve  Description: Ability to maintain a stable neurologic state will improve  Outcome: Ongoing

## 2021-05-27 NOTE — PROGRESS NOTES
[address] [facility]  consulted by Dr. Kiera Kincaid for monitoring and adjustment. Indication for treatment: Irene's gangrene   Goal trough: 10-15 mcg/mL / AUC <500     Ht Readings from Last 1 Encounters:   05/25/21 5' 9\" (1.753 m)     Wt Readings from Last 3 Encounters:   05/21/21 298 lb 14.4 oz (135.6 kg)   04/04/21 260 lb 4.8 oz (118.1 kg)   03/26/21 274 lb 12.8 oz (124.6 kg)      Pertinent Laboratory Values:   Temp Readings from Last 3 Encounters:   05/27/21 98.6 °F (37 °C) (Oral)   05/23/21 98.5 °F (36.9 °C) (Oral)   05/21/21 98 °F (36.7 °C) (Oral)     Recent Labs     05/25/21  0058 05/26/21  0458 05/27/21  0902   WBC 10.6* 7.7 8.7     Recent Labs     05/25/21  0058 05/26/21  0458 05/27/21  0902   BUN 31* 33* 25*   CREATININE 2.3* 2.3* 1.8*     Estimated Creatinine Clearance: 67 mL/min (A) (based on SCr of 1.8 mg/dL (H)). Intake/Output Summary (Last 24 hours) at 5/27/2021 1508  Last data filed at 5/26/2021 2220  Gross per 24 hour   Intake 2010 ml   Output 1800 ml   Net 210 ml       Pertinent Cultures:  Date Source Results   5/24/21 Blood Collected   5/15/21  3/24/21 Perineum Wound  Foot Wound Staph epidermidis (pen sensitive)  Staph aureus (pan sensitive)      Previous vancomycin levels:  TROUGH:  No results for input(s): VANCOTROUGH in the last 72 hours.   RANDOM:    Recent Labs     05/25/21  0058   VANCORANDOM 28.4     Assessment:   · WBC and temperature: WBC stable, afebrile   · SCr, BUN, and urine output: CKD, stable   · Day(s) of therapy: #4 resumed  · Vancomycin concentration: to be collected    Plan:  · Previously on vancomycin 1500 mg q24h   · Resumed 1500 mg q24h   · Trough not collected this AM until after dose given  · Check a trough level prior to next dose, due tomorrow AM  · Pharmacy will continue to monitor patient and adjust therapy as indicated    Thank you for the consult,  Johan Mariano, [address]

## 2021-05-27 NOTE — PLAN OF CARE
Problem: Pain:  Goal: Pain level will decrease  Description: Pain level will decrease  5/26/2021 2120 by Lita Inman RN  Outcome: Ongoing  5/26/2021 1007 by Ju Ortiz RN  Outcome: Ongoing  Goal: Control of acute pain  Description: Control of acute pain  5/26/2021 2120 by Lita Inman RN  Outcome: Ongoing  5/26/2021 1007 by Ju Ortiz RN  Outcome: Ongoing  Goal: Control of chronic pain  Description: Control of chronic pain  5/26/2021 2120 by Lita Inman RN  Outcome: Ongoing  5/26/2021 1007 by Ju Ortiz RN  Outcome: Ongoing     Problem: Skin Integrity:  Goal: Will show no infection signs and symptoms  Description: Will show no infection signs and symptoms  5/26/2021 2120 by Lita Inman RN  Outcome: Ongoing  5/26/2021 1007 by Ju Ortiz RN  Outcome: Ongoing  Goal: Absence of new skin breakdown  Description: Absence of new skin breakdown  5/26/2021 2120 by Lita Inman RN  Outcome: Ongoing  5/26/2021 1007 by Ju Ortiz RN  Outcome: Ongoing     Problem: Falls - Risk of:  Goal: Will remain free from falls  Description: Will remain free from falls  5/26/2021 2120 by Lita Inman RN  Outcome: Ongoing  5/26/2021 1007 by Ju Ortiz RN  Outcome: Ongoing  Goal: Absence of physical injury  Description: Absence of physical injury  5/26/2021 2120 by Lita Inman RN  Outcome: Ongoing  5/26/2021 1007 by Ju Ortiz RN  Outcome: Ongoing     Problem: Nutrition  Goal: Optimal nutrition therapy  5/26/2021 2120 by Lita Inman RN  Outcome: Ongoing  5/26/2021 1007 by Ju Ortiz RN  Outcome: Ongoing

## 2021-05-27 NOTE — PROGRESS NOTES
Attending Progress Note      PCP: Falguni Levin MD      Patient: Mer Aly   Gender: male  : 1975   Age: 55 y.o. MRN: [id number]  Room  : -A      Date of Admission: 2021    Chief Complaint   Patient presents with    Hypertension     180/82    Wound Check     Unable to get wound vac on by home health nurse           Subjective: groin pain , depressed         Medications:  Reviewed  Infusion Medications    dextrose      sodium chloride 125 mL/hr at 21 1431    sodium chloride       Scheduled Medications    sertraline  50 mg Oral Daily    insulin lispro  0-18 Units Subcutaneous TID WC    insulin lispro  0-9 Units Subcutaneous Nightly    vancomycin  1,500 mg Intravenous Q24H    aspirin  81 mg Oral Daily    tamsulosin  0.4 mg Oral Daily    metroNIDAZOLE  500 mg Oral TID    sodium chloride flush  5-40 mL Intravenous 2 times per day    heparin (porcine)  5,000 Units Subcutaneous 3 times per day    insulin glargine  0.15 Units/kg Subcutaneous Nightly    insulin lispro  0.05 Units/kg Subcutaneous TID WC     PRN Meds: LORazepam, morphine, glucose, dextrose, glucagon (rDNA), dextrose, ondansetron, oxyCODONE-acetaminophen, sodium chloride flush, sodium chloride, potassium chloride **OR** potassium alternative oral replacement **OR** potassium chloride, magnesium sulfate, promethazine **OR** ondansetron, polyethylene glycol, fleet, acetaminophen **OR** acetaminophen      Intake/Output Summary (Last 24 hours) at 2021 1741  Last data filed at 2021 2220  Gross per 24 hour   Intake 2010 ml   Output 1800 ml   Net 210 ml       Exam:  /61   Pulse 83   Temp 98.4 °F (36.9 °C) (Oral)   Resp 15   Ht 5' 9\" (1.753 m)   Wt 274 lb (124.3 kg)   SpO2 98%   BMI 40.46 kg/m²   General appearance: No distress,   Respiratory:  good air entry , no Rales , No wheezing, or rhonchi,  Cardiovascular: RRR, with normal S1/S2 .   Abdomen : Soft, non-tender, non-distended  , normal bowel sounds. Legs : No edema bilaterally. No DVT signs ,    Neurologic:  Alert and oriented ,        Labs:   Recent Labs     05/25/21  0058 05/26/21  0458 05/27/21  0902   WBC 10.6* 7.7 8.7   HGB 8.2* 7.7* 8.0*   HCT 26.1* 25.0* 24.8*    339 384     Recent Labs     05/25/21  0058 05/26/21  0458 05/27/21  0902   * 135 132*   K 3.9 4.4 4.8    105 103   CO2 20* 21 21   BUN 31* 33* 25*   CREATININE 2.3* 2.3* 1.8*   CALCIUM 7.7* 7.7* 7.8*     No results for input(s): AST, ALT, BILIDIR, BILITOT, ALKPHOS in the last 72 hours. No results for input(s): INR in the last 72 hours. No results for input(s): Marletta Celestine in the last 72 hours. Assessment/Plan:  Active Hospital Problems    Diagnosis Date Noted    Necrotizing fasciitis ([facility] [address] [M72.6] 05/24/2021   DM II  Depression   ARF on CKD      Plan:  Hopefully home tomorrow   Tele : no event - no fever - on room air    IVF . Sanam Umair monitor renal function   IV ABX   Wound care   Add Zoloft   DVT Prophylaxis   Diet: DIET CARB CONTROL; Carb Control: 5 carb choices (75 gms)/meal  Dietary Nutrition Supplements: Wound Healing Oral Supplement  Code Status: Full Code        [unfilled]    Treatment progress and plan was d/w pt/family .     MD sharmaine Stafford

## 2021-05-28 VITALS
RESPIRATION RATE: 14 BRPM | HEIGHT: 69 IN | SYSTOLIC BLOOD PRESSURE: 153 MMHG | BODY MASS INDEX: 40.58 KG/M2 | WEIGHT: 274 LBS | DIASTOLIC BLOOD PRESSURE: 96 MMHG | OXYGEN SATURATION: 99 % | TEMPERATURE: 97.7 F | HEART RATE: 85 BPM

## 2021-05-28 LAB
ANION GAP SERPL CALCULATED.3IONS-SCNC: 7 MMOL/L (ref 4–16)
BUN BLDV-MCNC: 24 MG/DL (ref 6–23)
CALCIUM SERPL-MCNC: 8.1 MG/DL (ref 8.3–10.6)
CHLORIDE BLD-SCNC: 103 MMOL/L (ref 99–110)
CO2: 21 MMOL/L (ref 21–32)
CREAT SERPL-MCNC: 1.7 MG/DL (ref 0.9–1.3)
GFR AFRICAN AMERICAN: 53 ML/MIN/1.73M2
GFR NON-AFRICAN AMERICAN: 44 ML/MIN/1.73M2
GLUCOSE BLD-MCNC: 154 MG/DL (ref 70–99)
GLUCOSE BLD-MCNC: 218 MG/DL (ref 70–99)
GLUCOSE BLD-MCNC: 265 MG/DL (ref 70–99)
GLUCOSE BLD-MCNC: 275 MG/DL (ref 70–99)
HCT VFR BLD CALC: 24.8 % (ref 42–52)
HEMOGLOBIN: 8.4 GM/DL (ref 13.5–18)
MCH RBC QN AUTO: 29.9 PG (ref 27–31)
MCHC RBC AUTO-ENTMCNC: 33.9 % (ref 32–36)
MCV RBC AUTO: 88.3 FL (ref 78–100)
PDW BLD-RTO: 13.1 % (ref 11.7–14.9)
PLATELET # BLD: 377 K/CU MM (ref 140–440)
PMV BLD AUTO: 9.1 FL (ref 7.5–11.1)
POTASSIUM SERPL-SCNC: 4.2 MMOL/L (ref 3.5–5.1)
RBC # BLD: 2.81 M/CU MM (ref 4.6–6.2)
SODIUM BLD-SCNC: 131 MMOL/L (ref 135–145)
WBC # BLD: 7.6 K/CU MM (ref 4–10.5)

## 2021-05-28 PROCEDURE — 99232 SBSQ HOSP IP/OBS MODERATE 35: CPT | Performed by: SURGERY

## 2021-05-28 PROCEDURE — 6370000000 HC RX 637 (ALT 250 FOR IP): Performed by: FAMILY MEDICINE

## 2021-05-28 PROCEDURE — 6360000002 HC RX W HCPCS: Performed by: FAMILY MEDICINE

## 2021-05-28 PROCEDURE — C1751 CATH, INF, PER/CENT/MIDLINE: HCPCS

## 2021-05-28 PROCEDURE — 94760 N-INVAS EAR/PLS OXIMETRY 1: CPT

## 2021-05-28 PROCEDURE — 76937 US GUIDE VASCULAR ACCESS: CPT

## 2021-05-28 PROCEDURE — 36410 VNPNXR 3YR/> PHY/QHP DX/THER: CPT

## 2021-05-28 PROCEDURE — 36415 COLL VENOUS BLD VENIPUNCTURE: CPT

## 2021-05-28 PROCEDURE — 2580000003 HC RX 258: Performed by: FAMILY MEDICINE

## 2021-05-28 PROCEDURE — 99213 OFFICE O/P EST LOW 20 MIN: CPT

## 2021-05-28 PROCEDURE — 94150 VITAL CAPACITY TEST: CPT

## 2021-05-28 PROCEDURE — 05HA33Z INSERTION OF INFUSION DEVICE INTO LEFT BRACHIAL VEIN, PERCUTANEOUS APPROACH: ICD-10-PCS | Performed by: SURGERY

## 2021-05-28 PROCEDURE — 80202 ASSAY OF VANCOMYCIN: CPT

## 2021-05-28 PROCEDURE — 85027 COMPLETE CBC AUTOMATED: CPT

## 2021-05-28 PROCEDURE — 80048 BASIC METABOLIC PNL TOTAL CA: CPT

## 2021-05-28 PROCEDURE — 82962 GLUCOSE BLOOD TEST: CPT

## 2021-05-28 RX ORDER — LIDOCAINE HYDROCHLORIDE 10 MG/ML
5 INJECTION, SOLUTION EPIDURAL; INFILTRATION; INTRACAUDAL; PERINEURAL ONCE
Status: DISCONTINUED | OUTPATIENT
Start: 2021-05-28 | End: 2021-05-28 | Stop reason: HOSPADM

## 2021-05-28 RX ORDER — SODIUM CHLORIDE 0.9 % (FLUSH) 0.9 %
5-40 SYRINGE (ML) INJECTION EVERY 12 HOURS SCHEDULED
Status: DISCONTINUED | OUTPATIENT
Start: 2021-05-28 | End: 2021-05-28 | Stop reason: HOSPADM

## 2021-05-28 RX ORDER — CHLORTHALIDONE 25 MG/1
25 TABLET ORAL DAILY
Status: DISCONTINUED | OUTPATIENT
Start: 2021-05-28 | End: 2021-05-28 | Stop reason: HOSPADM

## 2021-05-28 RX ORDER — OXYCODONE HYDROCHLORIDE AND ACETAMINOPHEN 5; 325 MG/1; MG/1
1 TABLET ORAL EVERY 8 HOURS PRN
Qty: 15 TABLET | Refills: 0 | Status: SHIPPED | OUTPATIENT
Start: 2021-05-28 | End: 2021-06-02

## 2021-05-28 RX ORDER — AMLODIPINE BESYLATE 5 MG/1
5 TABLET ORAL DAILY
Status: DISCONTINUED | OUTPATIENT
Start: 2021-05-28 | End: 2021-05-28 | Stop reason: HOSPADM

## 2021-05-28 RX ORDER — METRONIDAZOLE 500 MG/1
500 TABLET ORAL 3 TIMES DAILY
Qty: 42 TABLET | Refills: 0 | Status: ON HOLD | OUTPATIENT
Start: 2021-05-28 | End: 2021-06-15 | Stop reason: HOSPADM

## 2021-05-28 RX ORDER — SODIUM CHLORIDE 0.9 % (FLUSH) 0.9 %
5-40 SYRINGE (ML) INJECTION PRN
Status: DISCONTINUED | OUTPATIENT
Start: 2021-05-28 | End: 2021-05-28 | Stop reason: HOSPADM

## 2021-05-28 RX ORDER — SODIUM CHLORIDE 9 MG/ML
25 INJECTION, SOLUTION INTRAVENOUS PRN
Status: DISCONTINUED | OUTPATIENT
Start: 2021-05-28 | End: 2021-05-28 | Stop reason: HOSPADM

## 2021-05-28 RX ADMIN — CHLORTHALIDONE 25 MG: 25 TABLET ORAL at 08:06

## 2021-05-28 RX ADMIN — HEPARIN SODIUM 5000 UNITS: 5000 INJECTION INTRAVENOUS; SUBCUTANEOUS at 12:32

## 2021-05-28 RX ADMIN — LORAZEPAM 1 MG: 1 TABLET ORAL at 17:49

## 2021-05-28 RX ADMIN — AMLODIPINE BESYLATE 5 MG: 5 TABLET ORAL at 08:06

## 2021-05-28 RX ADMIN — METRONIDAZOLE 500 MG: 250 TABLET ORAL at 12:31

## 2021-05-28 RX ADMIN — HEPARIN SODIUM 5000 UNITS: 5000 INJECTION INTRAVENOUS; SUBCUTANEOUS at 06:27

## 2021-05-28 RX ADMIN — SERTRALINE HYDROCHLORIDE 50 MG: 50 TABLET ORAL at 08:06

## 2021-05-28 RX ADMIN — VANCOMYCIN HYDROCHLORIDE 1500 MG: 5 INJECTION, POWDER, LYOPHILIZED, FOR SOLUTION INTRAVENOUS at 06:27

## 2021-05-28 RX ADMIN — TAMSULOSIN HYDROCHLORIDE 0.4 MG: 0.4 CAPSULE ORAL at 08:06

## 2021-05-28 RX ADMIN — METRONIDAZOLE 500 MG: 250 TABLET ORAL at 06:27

## 2021-05-28 RX ADMIN — OXYCODONE HYDROCHLORIDE AND ACETAMINOPHEN 1 TABLET: 5; 325 TABLET ORAL at 04:25

## 2021-05-28 RX ADMIN — OXYCODONE HYDROCHLORIDE AND ACETAMINOPHEN 1 TABLET: 5; 325 TABLET ORAL at 12:31

## 2021-05-28 RX ADMIN — ASPIRIN 81 MG: 81 TABLET, COATED ORAL at 08:06

## 2021-05-28 ASSESSMENT — ENCOUNTER SYMPTOMS
GASTROINTESTINAL NEGATIVE: 1
RESPIRATORY NEGATIVE: 1
EYES NEGATIVE: 1
ALLERGIC/IMMUNOLOGIC NEGATIVE: 1

## 2021-05-28 ASSESSMENT — PAIN SCALES - GENERAL
PAINLEVEL_OUTOF10: 3
PAINLEVEL_OUTOF10: 8
PAINLEVEL_OUTOF10: 7
PAINLEVEL_OUTOF10: 1

## 2021-05-28 NOTE — CONSULTS
[address] [id number] Continence Nurse  Consult Note       Andra Lauren  AGE: 55 y.o. GENDER: male  : 1975  TODAY'S DATE:  2021    Subjective:     Reason for  Evaluation and Assessment: wound care assessment. Andra Lauren is a 55 y.o. male referred by:   [x] Physician  [] Nursing  [] Other:     Wound Identification:  Wound Type: diabetic and non-healing surgical  Contributing Factors: diabetes        PAST MEDICAL HISTORY        Diagnosis Date    Abscess     scrotal    Acute renal failure (ARF) ([address] 2019    Anxiety associated with depression     Anxiety associated with depression     Back pain 2012    Chest pain 2013    Diabetic nephropathy ([address]     Diabetic neuropathy ([address] 2011    Diabetic ulcer of left midfoot associated with type 2 diabetes mellitus, with fat layer exposed ([address] 2017    Diverticulosis     C scope + Dr. Cassie Snellen DM (diabetes mellitus), type 2 ([address]     DR. Turner podiatry    Hyperlipidemia LDL goal < 100 7/15/2013    Hypertension     Internal hemorrhoid     C scope + Dr. Blankenship Ban fracture 1988    Panic attacks     Pericarditis     Hospitalized with s/p heart cath normal    Peripheral autonomic neuropathy due to diabetes mellitus ([address]     Axonal EMG- N[facility], 2011    Sebaceous cyst 2011    URI (upper respiratory infection) 2012    WD-Wound, surgical, infected, initial encounter 2017    Wrist fracture 1986       PAST SURGICAL HISTORY    Past Surgical History:   Procedure Laterality Date   [address] 2013    Normal (Dr. Rene Mccormick)    COLONOSCOPY  2011    Pandiverticulosis, Nonbleeding internal hemorrhoids, Repeat colonoscopy at age 48- Dr. Kvng Martinez    \"had reconstruction surgery on nose a year after the other nose surgery\"    OTHER SURGICAL HISTORY Right 2015    I & D right great toe with partial amputation    OTHER SURGICAL HISTORY  12/04/2017    inc and drainage of abcess    VA DEEP INCIS FOOT BONE INFECTN Left 9/22/2018    LEFT FOOT DEBRIDEMENT INCISION AND DRAINAGE TOP AND BOTTOM performed by Jarret Childers DPM at [facility] N/A 5/15/2021    SCROTAL AND PERINEAL DEBRIDEMENT performed by Shannon Hoang MD at [facility] N/A 5/16/2021    SCROTAL RE-DEBRIDEMENT  INCISION AND DRAINAGE performed by Shannon Hoang MD at [address] [place] [address] [id number]    sebaceous cyst removal times 4     TOE AMPUTATION      right great toe    TOE AMPUTATION Right 3/23/2019    TOE AMPUTATION RIGHT 5TH TOE performed by Jarret Childers DPM at [place] [address] TOE AMPUTATION Right 8/6/2019    TOE AMPUTATION RIGHT 4TH TOE performed by Jarret Childers DPM at [address] [place] [address] UPPER GASTROINTESTINAL ENDOSCOPY  06/2/2011    Mild gastritis with moderatley severe antritis, small hiatal hernia, Dr. Khoa Young N/A 1/12/2019    EGD BIOPSY performed by Antoinette Walters MD at [facility]    Family History   Problem Relation Age of Onset    Cancer Mother         ?site   Hernan Guerin Stroke Mother     Bleeding Prob Mother     Diabetes Father     Heart Disease Father     High Blood Pressure Father     Obesity Father     Kidney Disease Father     Diabetes Sister     High Blood Pressure Sister     Mental Illness Sister     Obesity Sister     High Blood Pressure Other     Mental Illness Other         bipolar    Other Son         cyst in ear canal    ADHD Daughter        SOCIAL HISTORY    Social History     Tobacco Use    Smoking status: Former Smoker     Years: 20.00     Quit date: 11/18/2017     Years since quitting: 3.5    Smokeless tobacco: Never Used   Vaping Use    Vaping Use: Never used   Substance Use Topics    Alcohol use: Yes     Comment: occ    Drug use: Yes     Frequency: 7.0 times per week     Types: Marijuana ALLERGIES    Allergies   Allergen Reactions    Adhesive Tape Rash    Doxycycline Nausea And Vomiting    Reglan [Metoclopramide] Anxiety       MEDICATIONS    No current facility-administered medications on file prior to encounter. Current Outpatient Medications on File Prior to Encounter   Medication Sig Dispense Refill    chlorthalidone (HYGROTON) 25 MG tablet Take 1 tablet by mouth daily 30 tablet 3    tamsulosin (FLOMAX) 0.4 MG capsule Take 1 capsule by mouth daily 30 capsule 3    amLODIPine-benazepril (LOTREL) 5-20 MG per capsule Take 1 capsule by mouth daily 30 capsule 3    aspirin 81 MG EC tablet Take 1 tablet by mouth daily 30 tablet 3    linagliptin (TRADJENTA) 5 MG tablet Take 1 tablet by mouth daily 30 tablet 5    ondansetron (ZOFRAN) 4 MG tablet Take 1 tablet by mouth every 8 hours as needed for Nausea or Vomiting 20 tablet 0    Lancets MISC 1 each by Does not apply route daily 100 each 3    glucose monitoring kit (FREESTYLE) monitoring kit 1 each by Does not apply route once for 1 dose.  1 kit 0         Objective:      BP (!) 153/96   Pulse 85   Temp 97.7 °F (36.5 °C) (Oral)   Resp 14   Ht 5' 9\" (1.753 m)   Wt 274 lb (124.3 kg)   SpO2 99%   BMI 40.46 kg/m²   Pradeep Risk Score: Pradeep Scale Score: 18    LABS    CBC:   Lab Results   Component Value Date    WBC 7.6 05/28/2021    RBC 2.81 05/28/2021    HGB 8.4 05/28/2021    HCT 24.8 05/28/2021    MCV 88.3 05/28/2021    MCH 29.9 05/28/2021    MCHC 33.9 05/28/2021    RDW 13.1 05/28/2021     05/28/2021    MPV 9.1 05/28/2021     CMP:    Lab Results   Component Value Date     05/28/2021    K 4.2 05/28/2021     05/28/2021    CO2 21 05/28/2021    BUN 24 05/28/2021    CREATININE 1.7 05/28/2021    GFRAA 53 05/28/2021    LABGLOM 44 05/28/2021    GLUCOSE 265 05/28/2021    PROT 6.3 05/24/2021    PROT 7.1 04/11/2012    LABALBU 2.7 05/24/2021    LABALBU 48 03/27/2021    CALCIUM 8.1 05/28/2021    BILITOT 0.2 05/24/2021 ALKPHOS 79 05/24/2021    AST 14 05/24/2021    ALT 12 05/24/2021     Albumin:    Lab Results   Component Value Date    LABALBU 2.7 05/24/2021    LABALBU 48 03/27/2021     PT/INR:    Lab Results   Component Value Date    PROTIME 11.8 03/22/2019    PROTIME 12.5 12/13/2010    INR 1.02 03/22/2019     HgBA1c:    Lab Results   Component Value Date    LABA1C 9.4 05/25/2021         Assessment:     Patient Active Problem List   Diagnosis    Hiatal hernia    Diabetes mellitus type 2, improved controlled    Diabetic neuropathy ([address]    Panic attack    Anxiety associated with depression    Neck pain    DANIELA (obstructive sleep apnea)    Urinary frequency    Bilateral carpal tunnel syndrome- left worse than right    Hyperlipidemia with target LDL less than 100    Essential hypertension    Bronchitis    Osteomyelitis ([address]    Diabetic ulcer of left midfoot ([address]    WD-Diabetic ulcer of left midfoot associated with type 2 diabetes mellitus, with fat layer exposed ([address]    Abscess    Periumbilical hernia    WD-Wound, surgical, infected, initial encounter    Sepsis ([address]    Cellulitis of left foot    Constipation    Intractable nausea and vomiting    Uncontrolled type 2 diabetes mellitus (HCC)    Nausea and vomiting    Diabetic foot infection ([address]    Acute renal failure (ARF) ([address]    Osteomyelitis of fourth toe of right foot ([address]    Cellulitis    Cellulitis of left arm    Cellulitis, scrotum    Acute epididymitis    Irene gangrene    Recurrent major depressive disorder, in full remission ([address]    Generalized anxiety disorder    Morbid obesity with body mass index (BMI) of 40.0 to 44.9 in adult [facility])    Necrotizing fasciitis ([address]       Measurements:  Negative Pressure Wound Therapy Groin (Active)   Wound Type Surgical 05/28/21 0813   Unit Type kci 05/27/21 0820   Dressing Type Black foam 05/28/21 0813   Number of pieces used 4 05/21/21 0820   Cycle Continuous 05/28/21 0813   Target Pressure (mmHg) 125 05/28/21 0813   Canister changed? Yes 05/25/21 1400   Dressing Status Changed 05/25/21 1400   Dressing Changed Changed/New 05/25/21 1400   Drainage Amount Moderate 05/25/21 1400   Drainage Description Serosanguinous 05/25/21 1400   Dressing Change Due 05/28/21 05/25/21 1400   Wound Assessment Other (Comment) 05/19/21 0822   Shazia-wound Assessment Excoriated 05/25/21 1400   Shape irregular 05/17/21 0930   Odor None 05/25/21 1400   Number of days: 11       Wound 07/18/17 Other (Comment) WOUND #2 LEFT PLANTAR (ONSET 3 MTHS) (Active)   Number of days: 1410       Wound 12/03/17 Other (Comment) Foot Left (Active)   Number of days: 9371       Wound 12/08/17 #3 abdoment (onset 3 days ago) SURGICAL (Active)   Number of days: 6786       Wound 12/08/17 #4 Lt plantar (onset 6 months ago) DIABETIC ALCALA 1 (Active)   Number of days: 1267       Wound 05/18/18 # 5 LEFT PLANTAR (ONSET 1 YEAR AGO) DM WAG 1 (Active)   Number of days: 1106       Wound 09/17/18 Left dorsal foot and 4th toe amp site 9/19/18 (Active)   Number of days: 983       Incision 05/22/15 Foot Right (Active)   Number of days: 2198       Incision 12/04/17 Abdomen Mid (Active)   Number of days: 1270       Incision 09/22/18 Foot Left (Active)   Number of days: 979       Wound 01/11/19 left plantar foot wound (Active)   Number of days: 868       Wound 03/21/19 Toe (Comment  which one) Anterior;Right (Active)   Number of days: 799       Wound 03/21/19 Foot Left;Posterior hard callus area (Active)   Number of days: 799       Wound 03/24/21 Left;Plantar (Active)   Number of days: 65       Wound 05/13/21 Left;Plantar (Active)   Wound Etiology Diabetic 05/27/21 1413   Dressing Status Clean;Dry; Intact 05/28/21 0813   Wound Cleansed Cleansed with saline 05/27/21 1413   Dressing/Treatment Dry dressing;ABD 05/28/21 0813   Wound Length (cm) 0 cm 05/25/21 1400   Wound Width (cm) 0 cm 05/25/21 1400   Wound Depth (cm) 0 cm 05/25/21 1400   Wound Surface Area (cm^2) 0 cm^2 05/25/21 1400   Change in Wound Size % (l*w) 100 05/25/21 1400   Wound Volume (cm^3) 0 cm^3 05/25/21 1400   Wound Healing % 100 05/25/21 1400   Distance Tunneling (cm) 0 cm 05/25/21 1400   Tunneling Position ___ O'Clock 0 05/25/21 1400   Undermining Starts ___ O'Clock 0 05/25/21 1400   Undermining Ends___ O'Clock 0 05/25/21 1400   Undermining Maxium Distance (cm) 0 05/25/21 1400   Wound Assessment Dry;Eschar dry 05/27/21 1413   Drainage Amount None 05/27/21 1413   Drainage Description Serosanguinous 05/19/21 0900   Odor None 05/27/21 1413   Shazia-wound Assessment Intact 05/27/21 1413   Margins Defined edges 05/27/21 1413   Wound Thickness Description not for Pressure Injury Full thickness 05/27/21 1413   Number of days: 15       Wound 05/17/21 Groin Right scrotum extends to lower buttock (Active)   Wound Etiology Non-Healing Surgical 05/28/21 1200   Dressing Status New dressing applied 05/28/21 1200   Wound Cleansed Cleansed with saline 05/28/21 1200   Dressing/Treatment Moist to dry 05/28/21 1200   Wound Length (cm) 25 cm 05/25/21 1400   Wound Width (cm) 5.5 cm 05/25/21 1400   Wound Depth (cm) 5.3 cm 05/25/21 1400   Wound Surface Area (cm^2) 137.5 cm^2 05/25/21 1400   Change in Wound Size % (l*w) 59.26 05/25/21 1400   Wound Volume (cm^3) 728.75 cm^3 05/25/21 1400   Wound Healing % 46 05/25/21 1400   Distance Tunneling (cm) 0 cm 05/28/21 1200   Tunneling Position ___ O'Clock 0 05/28/21 1200   Undermining Starts ___ O'Clock 0 05/28/21 1200   Undermining Ends___ O'Clock 0 05/28/21 1200   Undermining Maxium Distance (cm) 0 05/28/21 1200   Wound Assessment Pink/red 05/28/21 0813   Drainage Amount Large 05/28/21 1200   Drainage Description Serosanguinous 05/28/21 1200   Odor None 05/28/21 1200   Shazia-wound Assessment Intact 05/25/21 1400   Margins Defined edges 05/28/21 1200   Wound Thickness Description not for Pressure Injury Full thickness 05/28/21 1200   Number of days: 11       Response to treatment:  With complaints of pain. Pain Assessment:  Severity:  moderate  Quality of pain: sore  Wound Pain Timing/Severity:  Dressing change  Premedicated: no    Plan:     Plan of Care: Wound 05/13/21 Left;Plantar-Dressing/Treatment: Dry dressing, ABD  Wound 05/17/21 Groin Right scrotum extends to lower buttock-Dressing/Treatment: Moist to dry (abd skin prep paper tape)     Patient in bed talking on the phone agreeable to wound care. Removed vac dressing from rt groin/scrotum and buttocks vac d/c'd today applied NS moist kerlix abd skin prep and paper tape and instructed pt how to do dressing at home. Pt's wife on the phon also listening how to do dressing. Pt to f/u with dr Brea Mark when discharged. Specialty Bed Required : no  [] Low Air Loss   [] Pressure Redistribution  [] Fluid Immersion  [] Bariatric  [] Total Pressure Relief  [] Other:     Discharge Plan:  Placement for patient upon discharge: home  Hospice Care: no  Patient appropriate for Outpatient [address] [place] [address] Street: pt to f/u with dr Brea Mark    Patient/Caregiver Teaching:  Level of patient/caregiver understanding able to:  Pt verbalized understanding of wound care. Electronically signed by Corine Salvador RN,  on 5/28/2021 at 4:28 PM

## 2021-05-28 NOTE — DISCHARGE SUMMARY
Patient: Johnny Cordova MD      Gender: male  : 1975   Age: 55 y.o. MRN: [id number]    Admitting Physician: Tanvi Ag MD  Discharge Physician: Brooke Lennox, MD     Code Status: Full Code     Admit Date: 2021   Discharge Date: 21      Disposition:  Home       Condition at Discharge:  stable . Follow-up appointments:  f/u one week with PCP , and with consultants as recommended . Outpatient to do list: f/u     Pt`s preferred phone number :[phone]  [facility]  Extended Emergency Contact Information  Primary Emergency Contact: [facility]  Address: [address] [place] [address] Phone: [phone]  Mobile Phone: [phone]  Relation: Spouse  Secondary Emergency Contact: Pau Trujillo, [address] [facility]  Home Phone: [phone]  Mobile Phone: [phone]  Relation: Parent      Discharge Diagnoses: Active Hospital Problems    Diagnosis     Necrotizing fasciitis ([address] [M72.6]    wound vac issues NO need for additional debridement   DM II uncontrolled   ARF on CKD   Depression         History of Present Illness: Alayna Law is a 55 y.o. male with resent groin necrotizing fascitis post surgical repair was discharged on IV ABX and wound Vac .  Pt presented to ER after worsening symptoms of pain and difficulty applying wound vac at home and CT repeated in ER revealed worsening condition , pt with past medical history of Abscess, Acute renal failure (ARF) ([address] Anxiety associated with depression, Anxiety associated with depression, Back pain, Chest pain, Diabetic nephropathy ([address] Diabetic neuropathy ([address] Diabetic ulcer of left midfoot associated with type 2 diabetes mellitus, with fat layer exposed ([address] Diverticulosis, DM (diabetes mellitus), type 2 ([address] Hyperlipidemia LDL goal < 100, Hypertension, Internal hemorrhoid, Nose fracture, Panic attacks, Pericarditis, Peripheral Refills: 3      linagliptin (TRADJENTA) 5 MG tablet Take 1 tablet by mouth daily  Qty: 30 tablet, Refills: 5      ondansetron (ZOFRAN) 4 MG tablet Take 1 tablet by mouth every 8 hours as needed for Nausea or Vomiting  Qty: 20 tablet, Refills: 0      Lancets MISC 1 each by Does not apply route daily  Qty: 100 each, Refills: 3      glucose monitoring kit (FREESTYLE) monitoring kit 1 each by Does not apply route once for 1 dose. Qty: 1 kit, Refills: 0           Current Discharge Medication List      STOP taking these medications       insulin glargine (LANTUS) 100 UNIT/ML injection vial Comments:   Reason for Stopping:         insulin lispro (HUMALOG) 100 UNIT/ML injection vial Comments:   Reason for Stopping:               Discharge ROS:  A complete review of systems was asked and negative except for Groin pain      Discharge Exam:  Estimated body mass index is 40.46 kg/m² as calculated from the following:    Height as of this encounter: 5' 9\" (1.753 m). Weight as of this encounter: 274 lb (124.3 kg). BP (!) 191/103   Pulse 86   Temp 98 °F (36.7 °C) (Oral)   Resp 16   Ht 5' 9\" (1.753 m)   Wt 274 lb (124.3 kg)   SpO2 97%   BMI 40.46 kg/m²   General appearance:  NAD  Heart[de-identified] Normal s1/s2, RRR, no murmurs, gallops, or rubs. No leg edema  Lungs:  Clear to auscultation, bilaterally without Rales/Wheezes/Rhonchi. Abdomen: Soft, non-tender, non-distended, bowel sounds present  Musculoskeletal:   no cyanosis, no edema  Neurologic:  Cranial nerves: II-XII intact, grossly non-focal.  Psychiatric:  A & O x3      Labs:  For convenience and continuity at follow-up the following most recent labs are provided:    Lab Results   Component Value Date    WBC 7.6 05/28/2021    HGB 8.4 05/28/2021    HCT 24.8 05/28/2021    MCV 88.3 05/28/2021     05/28/2021     05/28/2021    K 4.2 05/28/2021     05/28/2021    CO2 21 05/28/2021    BUN 24 05/28/2021    CREATININE 1.7 05/28/2021    CALCIUM 8.1 05/28/2021 PHOS 4.2 05/20/2021    BNP 10 05/01/2013    ALKPHOS 79 05/24/2021    ALT 12 05/24/2021    AST 14 05/24/2021    BILITOT 0.2 05/24/2021    LABALBU 2.7 05/24/2021    LABALBU 48 03/27/2021    LDLCALC 92 07/24/2017    TRIG 188 05/13/2021     Lab Results   Component Value Date    INR 1.02 03/22/2019    INR 1.21 09/15/2018    INR 0.99 05/01/2013       Results for orders placed or performed during the hospital encounter of 05/24/21   Culture, Blood 1    Specimen: Blood gases   Result Value Ref Range    Specimen BLOOD     Special Requests NONE     Culture NO GROWTH AT 72 HOURS    Culture, Blood 2    Specimen: Blood gases   Result Value Ref Range    Specimen BLOOD     Special Requests NONE     Culture NO GROWTH AT 72 HOURS    CBC auto diff   Result Value Ref Range    WBC 9.6 4.0 - 10.5 K/CU MM    RBC 3.22 (L) 4.6 - 6.2 M/CU MM    Hemoglobin 9.1 (L) 13.5 - 18.0 GM/DL    Hematocrit 28.1 (L) 42 - 52 %    MCV 87.3 78 - 100 FL    MCH 28.3 27 - 31 PG    MCHC 32.4 32.0 - 36.0 %    RDW 12.7 11.7 - 14.9 %    Platelets 618 101 - 761 K/CU MM    MPV 9.1 7.5 - 11.1 FL    Differential Type AUTOMATED DIFFERENTIAL     Segs Relative 79.9 (H) 36 - 66 %    Lymphocytes % 12.2 (L) 24 - 44 %    Monocytes % 6.2 (H) 0 - 4 %    Eosinophils % 0.4 0 - 3 %    Basophils % 0.5 0 - 1 %    Segs Absolute 7.7 K/CU MM    Lymphocytes Absolute 1.2 K/CU MM    Monocytes Absolute 0.6 K/CU MM    Eosinophils Absolute 0.0 K/CU MM    Basophils Absolute 0.1 K/CU MM    Nucleated RBC % 0.0 %    Total Nucleated RBC 0.0 K/CU MM    Total Immature Neutrophil 0.08 K/CU MM    Immature Neutrophil % 0.8 (H) 0 - 0.43 %   CMP   Result Value Ref Range    Sodium 134 (L) 135 - 145 MMOL/L    Potassium 4.3 3.5 - 5.1 MMOL/L    Chloride 103 99 - 110 mMol/L    CO2 20 (L) 21 - 32 MMOL/L    BUN 31 (H) 6 - 23 MG/DL    CREATININE 2.3 (H) 0.9 - 1.3 MG/DL    Glucose 219 (H) 70 - 99 MG/DL    Calcium 7.7 (L) 8.3 - 10.6 MG/DL    Albumin 2.7 (L) 3.4 - 5.0 GM/DL    Total Protein 6.3 (L) 6.4 - 8.2 GM/DL    Total Bilirubin 0.2 0.0 - 1.0 MG/DL    ALT 12 10 - 40 U/L    AST 14 (L) 15 - 37 IU/L    Alkaline Phosphatase 79 40 - 129 IU/L    GFR Non- 31 (L) >60 mL/min/1.73m2    GFR  37 (L) >60 mL/min/1.73m2    Anion Gap 11 4 - 16   Vancomycin, Random   Result Value Ref Range    Vancomycin Rm 28.4 UG/ML    DOSE AMOUNT DOSE AMT.  GIVEN - Vanco being dosed per levels     DOSE TIME DOSE TIME GIVEN - Variable    Hemoglobin A1c   Result Value Ref Range    Hemoglobin A1C 9.3 (H) 4.2 - 6.3 %    eAG 220 mg/dL   Basic Metabolic Panel w/ Reflex to MG   Result Value Ref Range    Sodium 133 (L) 135 - 145 MMOL/L    Potassium 3.9 3.5 - 5.1 MMOL/L    Chloride 102 99 - 110 mMol/L    CO2 20 (L) 21 - 32 MMOL/L    Anion Gap 11 4 - 16    BUN 31 (H) 6 - 23 MG/DL    CREATININE 2.3 (H) 0.9 - 1.3 MG/DL    Glucose 223 (H) 70 - 99 MG/DL    Calcium 7.7 (L) 8.3 - 10.6 MG/DL    GFR Non- 31 (L) >60 mL/min/1.73m2    GFR  37 (L) >60 mL/min/1.73m2   CBC auto differential   Result Value Ref Range    WBC 10.6 (H) 4.0 - 10.5 K/CU MM    RBC 2.91 (L) 4.6 - 6.2 M/CU MM    Hemoglobin 8.2 (L) 13.5 - 18.0 GM/DL    Hematocrit 26.1 (L) 42 - 52 %    MCV 89.7 78 - 100 FL    MCH 28.2 27 - 31 PG    MCHC 31.4 (L) 32.0 - 36.0 %    RDW 12.9 11.7 - 14.9 %    Platelets 381 768 - 369 K/CU MM    MPV 9.1 7.5 - 11.1 FL    Differential Type AUTOMATED DIFFERENTIAL     Segs Relative 81.4 (H) 36 - 66 %    Lymphocytes % 9.6 (L) 24 - 44 %    Monocytes % 7.2 (H) 0 - 4 %    Eosinophils % 0.3 0 - 3 %    Basophils % 0.5 0 - 1 %    Segs Absolute 8.7 K/CU MM    Lymphocytes Absolute 1.0 K/CU MM    Monocytes Absolute 0.8 K/CU MM    Eosinophils Absolute 0.0 K/CU MM    Basophils Absolute 0.1 K/CU MM    Nucleated RBC % 0.0 %    Total Nucleated RBC 0.0 K/CU MM    Total Immature Neutrophil 0.11 K/CU MM    Immature Neutrophil % 1.0 (H) 0 - 0.43 %   Hemoglobin A1c   Result Value Ref Range    Hemoglobin A1C 9.4 (H) 4.2 - 6.3 %    eAG 223 mg/dL   Basic Metabolic Panel w/ Reflex to MG   Result Value Ref Range    Sodium 135 135 - 145 MMOL/L    Potassium 4.4 3.5 - 5.1 MMOL/L    Chloride 105 99 - 110 mMol/L    CO2 21 21 - 32 MMOL/L    Anion Gap 9 4 - 16    BUN 33 (H) 6 - 23 MG/DL    CREATININE 2.3 (H) 0.9 - 1.3 MG/DL    Glucose 180 (H) 70 - 99 MG/DL    Calcium 7.7 (L) 8.3 - 10.6 MG/DL    GFR Non- 31 (L) >60 mL/min/1.73m2    GFR  37 (L) >60 mL/min/1.73m2   CBC   Result Value Ref Range    WBC 7.7 4.0 - 10.5 K/CU MM    RBC 2.75 (L) 4.6 - 6.2 M/CU MM    Hemoglobin 7.7 (L) 13.5 - 18.0 GM/DL    Hematocrit 25.0 (L) 42 - 52 %    MCV 90.9 78 - 100 FL    MCH 28.0 27 - 31 PG    MCHC 30.8 (L) 32.0 - 36.0 %    RDW 13.2 11.7 - 14.9 %    Platelets 965 658 - 908 K/CU MM    MPV 9.2 7.5 - 11.1 FL   Basic Metabolic Panel w/ Reflex to MG   Result Value Ref Range    Sodium 132 (L) 135 - 145 MMOL/L    Potassium 4.8 3.5 - 5.1 MMOL/L    Chloride 103 99 - 110 mMol/L    CO2 21 21 - 32 MMOL/L    Anion Gap 8 4 - 16    BUN 25 (H) 6 - 23 MG/DL    CREATININE 1.8 (H) 0.9 - 1.3 MG/DL    Glucose 231 (H) 70 - 99 MG/DL    Calcium 7.8 (L) 8.3 - 10.6 MG/DL    GFR Non- 41 (L) >60 mL/min/1.73m2    GFR  49 (L) >60 mL/min/1.73m2   CBC   Result Value Ref Range    WBC 8.7 4.0 - 10.5 K/CU MM    RBC 2.78 (L) 4.6 - 6.2 M/CU MM    Hemoglobin 8.0 (L) 13.5 - 18.0 GM/DL    Hematocrit 24.8 (L) 42 - 52 %    MCV 89.2 78 - 100 FL    MCH 28.8 27 - 31 PG    MCHC 32.3 32.0 - 36.0 %    RDW 12.9 11.7 - 14.9 %    Platelets 614 040 - 545 K/CU MM    MPV 9.2 7.5 - 11.1 FL   Basic Metabolic Panel w/ Reflex to MG   Result Value Ref Range    Sodium 131 (L) 135 - 145 MMOL/L    Potassium 4.2 3.5 - 5.1 MMOL/L    Chloride 103 99 - 110 mMol/L    CO2 21 21 - 32 MMOL/L    Anion Gap 7 4 - 16    BUN 24 (H) 6 - 23 MG/DL    CREATININE 1.7 (H) 0.9 - 1.3 MG/DL    Glucose 265 (H) 70 - 99 MG/DL    Calcium 8.1 (L) 8.3 - 10.6 MG/DL    GFR Non- 44 (L) >60 39 year(s)         Race                   Patient Number     [id number]         Room Number         0046   Visit Number       [id number]   Corporate ID       [id number]   Accession Number   [id number]         [address] [place] [address]   Ordering Physician Adrienne CHIU-CNP           Physician           MD  Procedure Type of Study   TTE procedure:ECHOCARDIOGRAM COMPLETE 2D W DOPPLER W COLOR. Procedure Date Date: 05/13/2021 Start: 11:19 AM Study Location: Portable Technical Quality: Fair visualization Indications:Elevated troponin. Patient Status: Routine Height: 69 inches Weight: 260 pounds BSA: 2.31 m2 BMI: 38.39 kg/m2 HR: 96 bpm BP: 138/74 mmHg  Conclusions   Summary  Left ventricular systolic function is normal.  Ejection fraction is visually estimated at 55-60%. Mild left ventricular hypertrophy. Mildly dilated right ventricle. RVSP of 20 mmHG  Sclerotic, but non-stenotic aortic valve. No evidence of any pericardial effusion. Signature   ------------------------------------------------------------------  Electronically signed by Brittany Forrest MD (Interpreting  physician) on 05/13/2021 at 05:55 PM  ------------------------------------------------------------------   Findings   Left Ventricle  Left ventricular systolic function is normal.  Ejection fraction is visually estimated at 55-60%. Mild left ventricular hypertrophy. Left Atrium  Essentially normal left atrium. Right Atrium  Essentially normal right atrium. Right Ventricle  Mildly dilated right ventricle. Aortic Valve  Sclerotic, but non-stenotic aortic valve. Trace aortic regurgitation. Mitral Valve  Trace mitral regurgitation. Tricuspid Valve  Trace tricuspid regurgitation; normal RVSP. Pulmonic Valve  The pulmonic valve was not well visualized.    Pericardial Effusion  No evidence of any pericardial effusion. Pleural Effusion  No evidence of pleural effusion.   M-Mode/2D Measurements & Calculations   LV Diastolic Dimension:  LV Systolic Dimension:  LA Dimension: 3.8 cmAO Root  3.75 cm                  2.85 cm                 Dimension: 3 cmLA Area:  LV FS:24 %               LV Volume Diastolic: 84 14.7 cm2  LV PW Diastolic: 7.32 cm ml  LV PW Systolic: 3.45 cm  LV Volume Systolic: 40  Septum Diastolic: 2.62   ml  cm                       LV EDV/LV EDV Index: 84 RV Diastolic Dimension:  Septum Systolic: 4.13 cm HS/27 [id number] ESV/LV ESV   4.21 cm  CO: 5.45 l/min           Index: 40 ml/17 m2  CI: 2.36 l/m*m2          EF Calculated (A4C):    LA/Aorta: 1.27                           52.4 %  LV Area Diastolic: 85.0  EF Calculated (2D):     LA volume/Index: 48 ml  cm2                      48.5 %                  /81B2  LV Area Systolic: 17 cm2                           LV Length: 6.41 cm                            LVOT: 2.1 cm  Doppler Measurements & Calculations   MV Peak E-Wave: 89.3  AV Peak Velocity: 156 cm/s   LVOT Peak Velocity: 105  cm/s                  AV Peak Gradient: 9.73 mmHg  cm/s  MV Peak A-Wave: 71.1  AV Mean Velocity: 108 cm/s   LVOT Mean Velocity: 73.3  cm/s                  AV Mean Gradient: 6 mmHg     cm/s  MV E/A Ratio: 1.26    AV VTI: 23.5 cm              LVOT Peak Gradient: 4  MV Peak Gradient:     AV Area (Continuity):2.42    mmHgLVOT Mean Gradient: 3  3.19 mmHg             cm2                          mmHg                                                     Estimated RVSP: 20 mmHg  MV P1/2t: 32 msec     LVOT VTI: 16.4 cm            Estimated RAP:3 mmHg  MVA by PHT:6.88 cm2                        Estimated PASP: 12.73 mmHg  MV E' Septal                                       TR Velocity:156 cm/s  Velocity: 9.87 cm/s                                TR Gradient:9.73 mmHg  MV E' Lateral  Velocity: 8.99 cm/s  MV E/E' septal: 9.05  MV E/E' lateral: 9.93      CT EVALUATION; DOPPLER EVALUATION OF THE PELVIS 5/13/2021 COMPARISON: None. HISTORY: ORDERING SYSTEM PROVIDED HISTORY: Right-sided testicular swelling TECHNOLOGIST PROVIDED HISTORY: Reason for exam:->Right-sided testicular swelling Reason for Exam: Rt pain and swelling Acuity: Acute Type of Exam: Initial; ORDERING SYSTEM PROVIDED HISTORY: Rt swelling TECHNOLOGIST PROVIDED HISTORY: Reason for exam:->Rt swelling Reason for Exam: Rt pain and swelling Acuity: Acute Type of Exam: Initial FINDINGS: Measurements: Right testicle: 2.9 x 3.1 x 3.7 cm Left testicle: 2.5 x 3.0 x 3.8 cm Right: Grey scale: The right testicle demonstrates normal homogeneous echotexture without focal lesion. No evidence of testicular microlithiasis. Doppler Evaluation:  There is normal arterial and venous Doppler flow within the testicle. Scrotal Sac:  No evidence of hydrocele. Scrotal wall edema is noted. Epididymis: The epididymis is enlarged and heterogeneous but does not demonstrate increased flow. Left: Grey scale: The left testicle demonstrates normal homogeneous echotexture without focal lesion. No evidence of testicular microlithiasis. Doppler Evaluation:  There is normal arterial and venous Doppler flow within the testicle. Scrotal Sac: There is a trace hydrocele. Scrotal wall edema is noted. Epididymis: The left epididymis could not be identified. 1. Normal flow in both testicles. 2. Appearance of the right epididymis suggests prior epididymitis. CT ABDOMEN PELVIS W IV CONTRAST Additional Contrast? None    Result Date: 5/24/2021  EXAMINATION: CT OF THE ABDOMEN AND PELVIS WITH CONTRAST 5/24/2021 5:15 pm TECHNIQUE: CT of the abdomen and pelvis was performed with the administration of intravenous contrast. Multiplanar reformatted images are provided for review. Dose modulation, iterative reconstruction, and/or weight based adjustment of the mA/kV was utilized to reduce the radiation dose to as low as reasonably achievable. COMPARISON: 05/15/2021 HISTORY: ORDERING SYSTEM PROVIDED HISTORY: hx of fourniers gangrene right testicle. worsening symptoms TECHNOLOGIST PROVIDED HISTORY: Reason for exam:->hx of fourniers gangrene right testicle. worsening symptoms Additional Contrast?->None Decision Support Exception - unselect if not a suspected or confirmed emergency medical condition->Emergency Medical Condition (MA) Reason for Exam: hx of fourniers gangrene right testicle. worsening symptoms FINDINGS: Lower Chest: No focal infiltrate. Organs: The liver, gallbladder, spleen, pancreas and adrenal glands are unremarkable. Both kidneys are functional and there is no hydronephrosis. GI/Bowel: No bowel obstruction. The appendix is normal. Pelvis: Urinary bladder is unremarkable. Peritoneum/Retroperitoneum: The abdominal aorta is atherosclerotic, but non aneurysmal.  Small retroperitoneal lymph nodes measuring up to 8 mm in the left para-aortic region are similar in size to 2019, and therefore most likely reactive. There is a small fat containing umbilical hernia. Bones/Soft Tissues: There is mildly decreased, but ongoing gas and stranding within the soft tissues of the perineum and scrotum, primarily on the right. Findings are consistent with necrotizing fasciitis/Irene's gangrene. Previously demonstrated hydroceles are smaller. The right testicle appears to be more proximal within the right scrotum. More distally within the right scrotum is a 2.0 x 1.5 cm abscess which contains fluid and gas. A 1.4 cm, likely reactive right inguinal lymph node is stable. No acute osseous abnormality is seen. 1. Mildly improved, but persistent stranding and gas within the perineum and scrotum. Findings remain consistent with necrotizing fasciitis/Irene gangrene. 2. 2 cm right scrotal abscess.  3. Near complete resolution of the hydroceles, which were large on the prior exam. Results of this examination were discussed with Dr. Darwin Boone at 5:33 p.m. on 05/24/2021. US DUP ABD PEL RETRO SCROT COMPLETE    Result Date: 5/13/2021  EXAMINATION: ULTRASOUND OF THE SCROTUM/TESTICLES WITH COLOR DOPPLER FLOW EVALUATION; DOPPLER EVALUATION OF THE PELVIS 5/13/2021 COMPARISON: None. HISTORY: ORDERING SYSTEM PROVIDED HISTORY: Right-sided testicular swelling TECHNOLOGIST PROVIDED HISTORY: Reason for exam:->Right-sided testicular swelling Reason for Exam: Rt pain and swelling Acuity: Acute Type of Exam: Initial; ORDERING SYSTEM PROVIDED HISTORY: Rt swelling TECHNOLOGIST PROVIDED HISTORY: Reason for exam:->Rt swelling Reason for Exam: Rt pain and swelling Acuity: Acute Type of Exam: Initial FINDINGS: Measurements: Right testicle: 2.9 x 3.1 x 3.7 cm Left testicle: 2.5 x 3.0 x 3.8 cm Right: Grey scale: The right testicle demonstrates normal homogeneous echotexture without focal lesion. No evidence of testicular microlithiasis. Doppler Evaluation:  There is normal arterial and venous Doppler flow within the testicle. Scrotal Sac:  No evidence of hydrocele. Scrotal wall edema is noted. Epididymis: The epididymis is enlarged and heterogeneous but does not demonstrate increased flow. Left: Grey scale: The left testicle demonstrates normal homogeneous echotexture without focal lesion. No evidence of testicular microlithiasis. Doppler Evaluation:  There is normal arterial and venous Doppler flow within the testicle. Scrotal Sac: There is a trace hydrocele. Scrotal wall edema is noted. Epididymis: The left epididymis could not be identified. 1. Normal flow in both testicles. 2. Appearance of the right epididymis suggests prior epididymitis.        EKG     Rhythm: normal sinus       Immunization History   Administered Date(s) Administered    Influenza 10/15/2013    Influenza, Quadv, 6 mo and older, IM, PF (Flulaval, Fluarix) 09/19/2018    Pneumococcal Polysaccharide ([id number]) 05/02/2013         The patient was seen and examined on day of discharge and this discharge summary is in conjunction with any daily progress note from day of discharge. Time Spent on discharge is   >35  min  in the examination, evaluation, counseling and review of medications and discharge plan.             Crissy Wu MD   5/28/2021

## 2021-05-28 NOTE — PROGRESS NOTES
General Surgery-Dr. Rebeka Gonzales Day: 5    Chief Complaint on Admission: Issues with wound vac      Subjective:     No acute complaints. Denies pain. Denies F/C. Tolerating PO. Denies issues with wound vac. ROS:  Review of Systems   Constitutional: Negative for chills and fever. HENT: Negative. Eyes: Negative. Respiratory: Negative. Cardiovascular: Negative. Gastrointestinal: Negative. Endocrine: Negative. Genitourinary: Negative. Musculoskeletal: Negative. Skin: Positive for wound. Allergic/Immunologic: Negative. Neurological: Negative. Hematological: Negative. Psychiatric/Behavioral: Negative. Allergies  Adhesive tape, Doxycycline, and Reglan [metoclopramide]          Diagnosis Date    Abscess     scrotal    Acute renal failure (ARF) ([address] 2019    Anxiety associated with depression     Anxiety associated with depression     Back pain 2012    Chest pain 2013    Diabetic nephropathy ([address]     Diabetic neuropathy ([address] 2011    Diabetic ulcer of left midfoot associated with type 2 diabetes mellitus, with fat layer exposed ([address] 2017    Diverticulosis     C scope + Dr. Jayesh Mayes DM (diabetes mellitus), type 2 ([address]     DR. Turner podiatry    Hyperlipidemia LDL goal < 100 7/15/2013    Hypertension     Internal hemorrhoid     C scope + Dr. Salas Macadam fracture 1988    Panic attacks     Pericarditis 2003    Hospitalized with s/p heart cath normal    Peripheral autonomic neuropathy due to diabetes mellitus ([address]     Axonal EMG- NCS, 2011    Sebaceous cyst 2011    URI (upper respiratory infection) 2012    WD-Wound, surgical, infected, initial encounter 2017    Wrist fracture 1986       Objective:     Vitals:    21 0834   BP:    Pulse:    Resp:    Temp:    SpO2: 97%       TEMPERATURE:  Current -Temp: 98 °F (36.7 °C);  Max - Temp  Av.2 °F (36.8 °C)  Min: 98 °F (36.7 °C)  Max: 98.4 °F (36.9 °C)    No intake/output data recorded. I/O last 3 completed shifts:  In: -   Out: 950 [Urine:950]      Physical Exam:  Physical Exam  Vitals reviewed. Constitutional:       General: He is not in acute distress. Appearance: He is not ill-appearing, toxic-appearing or diaphoretic. HENT:      Head: Normocephalic and atraumatic. Right Ear: External ear normal.      Left Ear: External ear normal.      Nose: Nose normal.   Eyes:      General:         Right eye: No discharge. Left eye: No discharge. Extraocular Movements: Extraocular movements intact. Cardiovascular:      Rate and Rhythm: Normal rate. Pulmonary:      Effort: No respiratory distress. Abdominal:      Palpations: Abdomen is soft. Genitourinary:     Comments: Wound vac in place, good seal, min and appropriately TTP, light serous drainage, no warmth or redness  Musculoskeletal:         General: No swelling. Cervical back: Normal range of motion. Skin:     General: Skin is warm. Neurological:      General: No focal deficit present. Mental Status: He is alert.    Psychiatric:         Mood and Affect: Mood normal.           Scheduled Meds:   chlorthalidone  25 mg Oral Daily    amLODIPine  5 mg Oral Daily    sertraline  50 mg Oral Daily    insulin lispro  0-18 Units Subcutaneous TID WC    insulin lispro  0-9 Units Subcutaneous Nightly    vancomycin  1,500 mg Intravenous Q24H    aspirin  81 mg Oral Daily    tamsulosin  0.4 mg Oral Daily    metroNIDAZOLE  500 mg Oral TID    sodium chloride flush  5-40 mL Intravenous 2 times per day    heparin (porcine)  5,000 Units Subcutaneous 3 times per day    insulin glargine  0.15 Units/kg Subcutaneous Nightly    insulin lispro  0.05 Units/kg Subcutaneous TID      ContinuousInfusions:   dextrose      sodium chloride 125 mL/hr at 05/27/21 1805    sodium chloride       PRN Meds:LORazepam, morphine, glucose, dextrose, glucagon (rDNA), dextrose, ondansetron, oxyCODONE-acetaminophen, sodium chloride flush, sodium chloride, potassium chloride **OR** potassium alternative oral replacement **OR** potassium chloride, magnesium sulfate, promethazine **OR** ondansetron, polyethylene glycol, fleet, acetaminophen **OR** acetaminophen      Labs/Imaging Results:   Lab Results   Component Value Date    WBC 7.6 05/28/2021    HGB 8.4 (L) 05/28/2021    HCT 24.8 (L) 05/28/2021    MCV 88.3 05/28/2021     05/28/2021     Lab Results   Component Value Date     (L) 05/28/2021    K 4.2 05/28/2021     05/28/2021    CO2 21 05/28/2021    BUN 24 (H) 05/28/2021    CREATININE 1.7 (H) 05/28/2021    GLUCOSE 265 (H) 05/28/2021    CALCIUM 8.1 (L) 05/28/2021    PROT 6.3 (L) 05/24/2021    LABALBU 2.7 (L) 05/24/2021    BILITOT 0.2 05/24/2021    ALKPHOS 79 05/24/2021    AST 14 (L) 05/24/2021    ALT 12 05/24/2021    LABGLOM 44 (L) 05/28/2021    GFRAA 53 (L) 05/28/2021       Assessment:       56 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 -Appreciate  and Wound Team eval.    -Still without any need for additional debridement at this time. -Given location of the wound and issus with keeping seal on wound vac, pt may benefit from wet-to-dry dressing changes instead of wound vac at d/c. D/w wound nurse team.     -Will continue to follow while pt is admitted. Ultimately, pt may require eval by PRS for skin graft given location. -OK for d/c from general surgery standpoint.      Electronically signed by Lynette Lawson II, MD on 5/28/2021 at 10:18 AM

## 2021-05-28 NOTE — PROGRESS NOTES
PICC team, Kay Azul RN,  explained to me that they could not put in a PICC line with a pt who has Kidney disease but they could put in a indwelling cath. I contacted Dr. Zari Moise and explained to him what the PICC team, Kay Azul RN, stated and Dr. Zari Moise approved the indwelling cath.

## 2021-05-28 NOTE — PLAN OF CARE
Problem: Pain:  Goal: Pain level will decrease  Description: Pain level will decrease  5/27/2021 2029 by Suzanne Pedraza RN  Outcome: Ongoing  5/27/2021 1036 by Bharti Logan RN  Outcome: Ongoing  Goal: Control of acute pain  Description: Control of acute pain  5/27/2021 2029 by Suzanne Pedraza RN  Outcome: Ongoing  5/27/2021 1036 by Bharti Logan RN  Outcome: Ongoing  Goal: Control of chronic pain  Description: Control of chronic pain  5/27/2021 2029 by Suzanne Pedraza RN  Outcome: Ongoing  5/27/2021 1036 by Bharti Logan RN  Outcome: Ongoing     Problem: Skin Integrity:  Goal: Will show no infection signs and symptoms  Description: Will show no infection signs and symptoms  5/27/2021 2029 by Suzanne Pedraza RN  Outcome: Ongoing  5/27/2021 1036 by Bharti Logan RN  Outcome: Ongoing  Goal: Absence of new skin breakdown  Description: Absence of new skin breakdown  5/27/2021 2029 by Suzanne Pedraza RN  Outcome: Ongoing  5/27/2021 1036 by Bharti Logan RN  Outcome: Ongoing     Problem: Falls - Risk of:  Goal: Will remain free from falls  Description: Will remain free from falls  5/27/2021 2029 by Suzanne Pedraza RN  Outcome: Ongoing  5/27/2021 1036 by Bharti Logan RN  Outcome: Ongoing  Goal: Absence of physical injury  Description: Absence of physical injury  5/27/2021 2029 by Suzanne Pedraza RN  Outcome: Ongoing  5/27/2021 1036 by Bharti Logan RN  Outcome: Ongoing     Problem: Nutrition  Goal: Optimal nutrition therapy  5/27/2021 2029 by Suzanne Pedraza RN  Outcome: Ongoing  5/27/2021 1036 by Bharti Logan RN  Outcome: Ongoing     Problem: Wound:  Goal: Will show signs of wound healing; wound closure and no evidence of infection  Description: Will show signs of wound healing; wound closure and no evidence of infection  5/27/2021 2029 by Suzanne Pedraza RN  Outcome: Ongoing  5/27/2021 1036 by Bharti Logan RN  Outcome: Ongoing     Problem: Pressure Ulcer:  Goal: Will show no infection signs and symptoms  Description: Will show no infection signs and symptoms  5/27/2021 2029 by Khurram Vance RN  Outcome: Ongoing  5/27/2021 1036 by Tea Lubin RN  Outcome: Ongoing  Goal: Signs of wound healing will improve  Description: Signs of wound healing will improve  5/27/2021 2029 by Khurram Vance RN  Outcome: Ongoing  5/27/2021 1036 by Tea Lubin RN  Outcome: Ongoing  Goal: Absence of new pressure ulcer  Description: Absence of new pressure ulcer  5/27/2021 2029 by Khurram Vance RN  Outcome: Ongoing  5/27/2021 1036 by Tea Lubin RN  Outcome: Ongoing     Problem: Arterial:  Goal: Optimize blood flow for wound healing  Description: Optimize blood flow for wound healing  5/27/2021 2029 by Khurram Vance RN  Outcome: Ongoing  5/27/2021 1036 by Tea Lubin RN  Outcome: Ongoing     Problem: Venous:  Goal: Signs of wound healing will improve  Description: Signs of wound healing will improve  5/27/2021 2029 by Khurram Vance RN  Outcome: Ongoing  5/27/2021 1036 by Tea Lubin RN  Outcome: Ongoing     Problem: Smoking cessation:  Goal: Ability to formulate a plan to maintain a tobacco-free life will be supported  Description: Ability to formulate a plan to maintain a tobacco-free life will be supported  5/27/2021 2029 by Khurram Vance RN  Outcome: Ongoing  5/27/2021 1036 by Tea Lubin RN  Outcome: Ongoing     Problem: Compression therapy:  Goal: Will be free from complications associated with compression therapy  Description: Will be free from complications associated with compression therapy  5/27/2021 2029 by Khurram Vance RN  Outcome: Ongoing  5/27/2021 1036 by Tea Lubin RN  Outcome: Ongoing     Problem: Weight control:  Goal: Ability to maintain an optimal weight for height and age will be supported  Description: Ability to maintain an optimal weight for height and age will be supported  5/27/2021 2029 by Khurram Vance RN  Outcome: Ongoing  5/27/2021 1036 by Toya Ruiz RN  Outcome: Ongoing     Problem: Falls - Risk of:  Goal: Will remain free from falls  Description: Will remain free from falls  5/27/2021 2029 by Candido Jacobson RN  Outcome: Ongoing  5/27/2021 1036 by Toya Ruiz RN  Outcome: Ongoing     Problem: Blood Glucose:  Goal: Ability to maintain appropriate glucose levels will improve  Description: Ability to maintain appropriate glucose levels will improve  5/27/2021 2029 by Candido Jacobson RN  Outcome: Ongoing  5/27/2021 1036 by Toya Ruiz RN  Outcome: Ongoing     Problem: Discharge Planning:  Goal: Discharged to appropriate level of care  Description: Discharged to appropriate level of care  5/27/2021 2029 by Candido Jacobson RN  Outcome: Ongoing  5/27/2021 1036 by Toya Ruiz RN  Outcome: Ongoing     Problem: Serum Glucose Level - Abnormal:  Goal: Ability to maintain appropriate glucose levels will improve  Description: Ability to maintain appropriate glucose levels will improve  5/27/2021 2029 by Candido Jacobson RN  Outcome: Ongoing  5/27/2021 1036 by Toya Ruiz RN  Outcome: Ongoing     Problem: Sensory Perception - Impaired:  Goal: Ability to maintain a stable neurologic state will improve  Description: Ability to maintain a stable neurologic state will improve  5/27/2021 2029 by Candido Jacobson RN  Outcome: Ongoing  5/27/2021 1036 by Toya Ruiz RN  Outcome: Ongoing

## 2021-05-28 NOTE — PLAN OF CARE
Problem: Pain:  Goal: Pain level will decrease  Description: Pain level will decrease  Outcome: Ongoing  Goal: Control of acute pain  Description: Control of acute pain  Outcome: Ongoing  Goal: Control of chronic pain  Description: Control of chronic pain  Outcome: Ongoing     Problem: Skin Integrity:  Goal: Will show no infection signs and symptoms  Description: Will show no infection signs and symptoms  Outcome: Ongoing  Goal: Absence of new skin breakdown  Description: Absence of new skin breakdown  Outcome: Ongoing     Problem: Falls - Risk of:  Goal: Will remain free from falls  Description: Will remain free from falls  Outcome: Ongoing  Goal: Absence of physical injury  Description: Absence of physical injury  Outcome: Ongoing     Problem: Nutrition  Goal: Optimal nutrition therapy  Outcome: Ongoing     Problem: Wound:  Goal: Will show signs of wound healing; wound closure and no evidence of infection  Description: Will show signs of wound healing; wound closure and no evidence of infection  Outcome: Ongoing     Problem: Pressure Ulcer:  Goal: Will show no infection signs and symptoms  Description: Will show no infection signs and symptoms  Outcome: Ongoing  Goal: Signs of wound healing will improve  Description: Signs of wound healing will improve  Outcome: Ongoing  Goal: Absence of new pressure ulcer  Description: Absence of new pressure ulcer  Outcome: Ongoing     Problem: Arterial:  Goal: Optimize blood flow for wound healing  Description: Optimize blood flow for wound healing  Outcome: Ongoing     Problem: Venous:  Goal: Signs of wound healing will improve  Description: Signs of wound healing will improve  Outcome: Ongoing     Problem: Smoking cessation:  Goal: Ability to formulate a plan to maintain a tobacco-free life will be supported  Description: Ability to formulate a plan to maintain a tobacco-free life will be supported  Outcome: Ongoing     Problem: Compression therapy:  Goal: Will be free from complications associated with compression therapy  Description: Will be free from complications associated with compression therapy  Outcome: Ongoing     Problem: Weight control:  Goal: Ability to maintain an optimal weight for height and age will be supported  Description: Ability to maintain an optimal weight for height and age will be supported  Outcome: Ongoing     Problem: Falls - Risk of:  Goal: Will remain free from falls  Description: Will remain free from falls  Outcome: Ongoing     Problem: Blood Glucose:  Goal: Ability to maintain appropriate glucose levels will improve  Description: Ability to maintain appropriate glucose levels will improve  Outcome: Ongoing     Problem: Discharge Planning:  Goal: Discharged to appropriate level of care  Description: Discharged to appropriate level of care  Outcome: Ongoing     Problem: Serum Glucose Level - Abnormal:  Goal: Ability to maintain appropriate glucose levels will improve  Description: Ability to maintain appropriate glucose levels will improve  Outcome: Ongoing     Problem: Sensory Perception - Impaired:  Goal: Ability to maintain a stable neurologic state will improve  Description: Ability to maintain a stable neurologic state will improve  Outcome: Ongoing

## 2021-05-28 NOTE — CARE COORDINATION
Phoned and spoke with patient's nurse . She stated that patient might discharge today or tomorrow. Notified [facility] of possible d/c and faxed face sheet , H/P , [facility] order .  Will need to notify [facility] when discharged at [phone] and fax discharge orders (AVS) to [phone]

## 2021-05-29 LAB
CULTURE: NORMAL
CULTURE: NORMAL
Lab: NORMAL
Lab: NORMAL
SPECIMEN: NORMAL
SPECIMEN: NORMAL

## 2021-05-29 NOTE — CARE COORDINATION
CM contacted [facility] and spoke with Yesy Morrison to confirm that they were aware of pt  Discharge 5/28/20 and received the [facility] [place]. order and necessary medical information. Yesy Morrison states that they are aware of pt discharge yesterday and have all medical information that is needed.

## 2021-05-31 ENCOUNTER — HOSPITAL ENCOUNTER (OUTPATIENT)
Age: 46
Setting detail: SPECIMEN
Discharge: HOME | End: 2021-05-31
Payer: MEDICARE

## 2021-05-31 PROCEDURE — 85025 COMPLETE CBC W/AUTO DIFF WBC: CPT

## 2021-05-31 PROCEDURE — 86140 C-REACTIVE PROTEIN: CPT

## 2021-05-31 PROCEDURE — 85652 RBC SED RATE AUTOMATED: CPT

## 2021-05-31 PROCEDURE — 80202 ASSAY OF VANCOMYCIN: CPT

## 2021-05-31 PROCEDURE — 80053 COMPREHEN METABOLIC PANEL: CPT

## 2021-06-01 LAB
BASOPHILS ABSOLUTE: 0.1 K/CU MM
BASOPHILS RELATIVE PERCENT: 0.8 % (ref 0–1)
DIFFERENTIAL TYPE: ABNORMAL
EOSINOPHILS ABSOLUTE: 0.1 K/CU MM
EOSINOPHILS RELATIVE PERCENT: 0.9 % (ref 0–3)
ERYTHROCYTE SEDIMENTATION RATE: 147 MM/HR (ref 0–15)
HCT VFR BLD CALC: 26.8 % (ref 42–52)
HEMOGLOBIN: 8.7 GM/DL (ref 13.5–18)
IMMATURE NEUTROPHIL %: 0.4 % (ref 0–0.43)
LYMPHOCYTES ABSOLUTE: 0.9 K/CU MM
LYMPHOCYTES RELATIVE PERCENT: 9.6 % (ref 24–44)
MCH RBC QN AUTO: 29.5 PG (ref 27–31)
MCHC RBC AUTO-ENTMCNC: 32.5 % (ref 32–36)
MCV RBC AUTO: 90.8 FL (ref 78–100)
MONOCYTES ABSOLUTE: 0.8 K/CU MM
MONOCYTES RELATIVE PERCENT: 8.4 % (ref 0–4)
NUCLEATED RBC %: 0 %
PDW BLD-RTO: 13.8 % (ref 11.7–14.9)
PLATELET # BLD: 451 K/CU MM (ref 140–440)
PMV BLD AUTO: 9.3 FL (ref 7.5–11.1)
RBC # BLD: 2.95 M/CU MM (ref 4.6–6.2)
SEGMENTED NEUTROPHILS ABSOLUTE COUNT: 7.6 K/CU MM
SEGMENTED NEUTROPHILS RELATIVE PERCENT: 79.9 % (ref 36–66)
TOTAL IMMATURE NEUTOROPHIL: 0.04 K/CU MM
TOTAL NUCLEATED RBC: 0 K/CU MM
WBC # BLD: 9.5 K/CU MM (ref 4–10.5)

## 2021-06-02 LAB
ALBUMIN SERPL-MCNC: 2.5 GM/DL (ref 3.5–5)
ALP BLD-CCNC: 67 IU/L (ref 25–100)
ALT SERPL-CCNC: 8 U/L (ref 10–40)
ANION GAP SERPL CALCULATED.3IONS-SCNC: 11 MMOL/L (ref 4–16)
AST SERPL-CCNC: 12 IU/L (ref 8–33)
BILIRUB SERPL-MCNC: 0.2 MG/DL (ref 0.3–1.2)
BUN BLDV-MCNC: 35 MG/DL (ref 6–23)
C-REACTIVE PROTEIN, HIGH SENSITIVITY: 5.6 MG/L
CALCIUM SERPL-MCNC: 8.1 MG/DL (ref 8.3–10.6)
CHLORIDE BLD-SCNC: 104 MMOL/L (ref 99–110)
CO2: 22 MMOL/L (ref 21–32)
CREAT SERPL-MCNC: 2.4 MG/DL (ref 0.9–1.3)
DOSE AMOUNT: ABNORMAL
DOSE TIME: ABNORMAL
GFR AFRICAN AMERICAN: 35 ML/MIN/1.73M2
GFR NON-AFRICAN AMERICAN: 29 ML/MIN/1.73M2
GLUCOSE BLD-MCNC: 100 MG/DL (ref 70–99)
POTASSIUM SERPL-SCNC: 4.3 MMOL/L (ref 3.5–5.1)
SODIUM BLD-SCNC: 137 MMOL/L (ref 136–145)
TOTAL PROTEIN: 5.7 GM/DL (ref 6.4–8.3)
VANCOMYCIN TROUGH: 22.4 UG/ML (ref 10–20)
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
## 2021-06-04 ENCOUNTER — TELEPHONE (OUTPATIENT)
Dept: BARIATRICS/WEIGHT MGMT | Age: 46
End: 2021-06-04

## 2021-06-04 DIAGNOSIS — Z87.438 HISTORY OF FOURNIER'S GANGRENE: Primary | ICD-10-CM

## 2021-06-04 RX ORDER — OXYCODONE HYDROCHLORIDE AND ACETAMINOPHEN 5; 325 MG/1; MG/1
1 TABLET ORAL EVERY 6 HOURS PRN
Qty: 12 TABLET | Refills: 0 | Status: ON HOLD | OUTPATIENT
Start: 2021-06-04 | End: 2021-06-15

## 2021-06-04 NOTE — TELEPHONE ENCOUNTER
Pt called stating he is out of pain medication and still in a lot of pain, asking for a refill on pain medication. Pt uses [facility] on [address]. Pt also stated he is out of wound care supplies. His [facility] nurse ordered some but will take 7-10 days. Pt could not tell me when the supplies were ordered.

## 2021-06-04 NOTE — TELEPHONE ENCOUNTER
Dr. Soheila Gonzalez advises the pt to take tylenol and motrin alt q4 hr's - this pt is primarily a uro pt not gen surg. - pt will need to advise them for wound supplies  And poss meds . - pt became VERY ANGRY and hung up on the staff.

## 2021-06-05 ENCOUNTER — APPOINTMENT (OUTPATIENT)
Dept: GENERAL RADIOLOGY | Age: 46
DRG: 074 | End: 2021-06-05
Payer: MEDICARE

## 2021-06-05 ENCOUNTER — HOSPITAL ENCOUNTER (INPATIENT)
Age: 46
LOS: 10 days | Discharge: HOME | DRG: 074 | End: 2021-06-15
Attending: EMERGENCY MEDICINE | Admitting: FAMILY MEDICINE
Payer: MEDICARE

## 2021-06-05 ENCOUNTER — APPOINTMENT (OUTPATIENT)
Dept: NUCLEAR MEDICINE | Age: 46
DRG: 074 | End: 2021-06-05
Payer: MEDICARE

## 2021-06-05 DIAGNOSIS — R77.8 ELEVATED TROPONIN: ICD-10-CM

## 2021-06-05 DIAGNOSIS — R06.02 SHORTNESS OF BREATH: ICD-10-CM

## 2021-06-05 DIAGNOSIS — R55 SYNCOPE AND COLLAPSE: Primary | ICD-10-CM

## 2021-06-05 DIAGNOSIS — Z87.438 HISTORY OF FOURNIER'S GANGRENE: ICD-10-CM

## 2021-06-05 LAB
ANION GAP SERPL CALCULATED.3IONS-SCNC: 12 MMOL/L (ref 4–16)
BACTERIA: NEGATIVE /HPF
BASOPHILS ABSOLUTE: 0.1 K/CU MM
BASOPHILS RELATIVE PERCENT: 0.7 % (ref 0–1)
BILIRUBIN URINE: NEGATIVE MG/DL
BLOOD, URINE: ABNORMAL
BUN BLDV-MCNC: 51 MG/DL (ref 6–23)
CALCIUM SERPL-MCNC: 8.4 MG/DL (ref 8.3–10.6)
CHLORIDE BLD-SCNC: 104 MMOL/L (ref 99–110)
CLARITY: ABNORMAL
CO2: 20 MMOL/L (ref 21–32)
COLOR: YELLOW
CREAT SERPL-MCNC: 2.5 MG/DL (ref 0.9–1.3)
D DIMER: 558 NG/ML(DDU)
DIFFERENTIAL TYPE: ABNORMAL
EOSINOPHILS ABSOLUTE: 0.1 K/CU MM
EOSINOPHILS RELATIVE PERCENT: 1.7 % (ref 0–3)
GFR AFRICAN AMERICAN: 34 ML/MIN/1.73M2
GFR NON-AFRICAN AMERICAN: 28 ML/MIN/1.73M2
GLUCOSE BLD-MCNC: 158 MG/DL
GLUCOSE BLD-MCNC: 158 MG/DL (ref 70–99)
GLUCOSE BLD-MCNC: 75 MG/DL (ref 70–99)
GLUCOSE, URINE: NEGATIVE MG/DL
HCT VFR BLD CALC: 28.1 % (ref 42–52)
HEMOGLOBIN: 9.3 GM/DL (ref 13.5–18)
HYALINE CASTS: 5 /LPF
IMMATURE NEUTROPHIL %: 0.4 % (ref 0–0.43)
KETONES, URINE: NEGATIVE MG/DL
LACTIC ACID, SEPSIS: 1.4 MMOL/L (ref 0.5–1.9)
LEUKOCYTE ESTERASE, URINE: ABNORMAL
LYMPHOCYTES ABSOLUTE: 0.9 K/CU MM
LYMPHOCYTES RELATIVE PERCENT: 12.3 % (ref 24–44)
MCH RBC QN AUTO: 29.4 PG (ref 27–31)
MCHC RBC AUTO-ENTMCNC: 33.1 % (ref 32–36)
MCV RBC AUTO: 88.9 FL (ref 78–100)
MONOCYTES ABSOLUTE: 0.6 K/CU MM
MONOCYTES RELATIVE PERCENT: 8.7 % (ref 0–4)
MUCUS: ABNORMAL HPF
NITRITE URINE, QUANTITATIVE: NEGATIVE
NUCLEATED RBC %: 0 %
PDW BLD-RTO: 13.9 % (ref 11.7–14.9)
PH, URINE: 5 (ref 5–8)
PLATELET # BLD: 363 K/CU MM (ref 140–440)
PMV BLD AUTO: 9.1 FL (ref 7.5–11.1)
POTASSIUM SERPL-SCNC: 4.4 MMOL/L (ref 3.5–5.1)
PROTEIN UA: >500 MG/DL
RBC # BLD: 3.16 M/CU MM (ref 4.6–6.2)
RBC URINE: <1 /HPF (ref 0–3)
SEGMENTED NEUTROPHILS ABSOLUTE COUNT: 5.5 K/CU MM
SEGMENTED NEUTROPHILS RELATIVE PERCENT: 76.2 % (ref 36–66)
SODIUM BLD-SCNC: 136 MMOL/L (ref 135–145)
SPECIFIC GRAVITY UA: 1.01 (ref 1–1.03)
SQUAMOUS EPITHELIAL: 1 /HPF
TOTAL IMMATURE NEUTOROPHIL: 0.03 K/CU MM
TOTAL NUCLEATED RBC: 0 K/CU MM
TRICHOMONAS: ABNORMAL /HPF
TROPONIN T: 0.02 NG/ML
TROPONIN T: 0.03 NG/ML
UROBILINOGEN, URINE: NEGATIVE MG/DL (ref 0.2–1)
WBC # BLD: 7.2 K/CU MM (ref 4–10.5)
WBC UA: 2 /HPF (ref 0–2)

## 2021-06-05 PROCEDURE — 36415 COLL VENOUS BLD VENIPUNCTURE: CPT

## 2021-06-05 PROCEDURE — 3430000000 HC RX DIAGNOSTIC RADIOPHARMACEUTICAL: Performed by: EMERGENCY MEDICINE

## 2021-06-05 PROCEDURE — 1200000000 HC SEMI PRIVATE

## 2021-06-05 PROCEDURE — 82962 GLUCOSE BLOOD TEST: CPT

## 2021-06-05 PROCEDURE — 80048 BASIC METABOLIC PNL TOTAL CA: CPT

## 2021-06-05 PROCEDURE — 85379 FIBRIN DEGRADATION QUANT: CPT

## 2021-06-05 PROCEDURE — A9539 TC99M PENTETATE: HCPCS | Performed by: EMERGENCY MEDICINE

## 2021-06-05 PROCEDURE — 2580000003 HC RX 258: Performed by: EMERGENCY MEDICINE

## 2021-06-05 PROCEDURE — 96361 HYDRATE IV INFUSION ADD-ON: CPT

## 2021-06-05 PROCEDURE — 6360000002 HC RX W HCPCS: Performed by: EMERGENCY MEDICINE

## 2021-06-05 PROCEDURE — A9540 TC99M MAA: HCPCS | Performed by: EMERGENCY MEDICINE

## 2021-06-05 PROCEDURE — 96375 TX/PRO/DX INJ NEW DRUG ADDON: CPT

## 2021-06-05 PROCEDURE — 81001 URINALYSIS AUTO W/SCOPE: CPT

## 2021-06-05 PROCEDURE — 71045 X-RAY EXAM CHEST 1 VIEW: CPT

## 2021-06-05 PROCEDURE — 78582 LUNG VENTILAT&PERFUS IMAGING: CPT

## 2021-06-05 PROCEDURE — 93005 ELECTROCARDIOGRAM TRACING: CPT | Performed by: EMERGENCY MEDICINE

## 2021-06-05 PROCEDURE — 85025 COMPLETE CBC W/AUTO DIFF WBC: CPT

## 2021-06-05 PROCEDURE — 99285 EMERGENCY DEPT VISIT HI MDM: CPT

## 2021-06-05 PROCEDURE — 84484 ASSAY OF TROPONIN QUANT: CPT

## 2021-06-05 PROCEDURE — 6370000000 HC RX 637 (ALT 250 FOR IP): Performed by: EMERGENCY MEDICINE

## 2021-06-05 PROCEDURE — 83605 ASSAY OF LACTIC ACID: CPT

## 2021-06-05 PROCEDURE — 96374 THER/PROPH/DIAG INJ IV PUSH: CPT

## 2021-06-05 RX ORDER — SODIUM CHLORIDE 9 MG/ML
INJECTION, SOLUTION INTRAVENOUS CONTINUOUS
Status: DISCONTINUED | OUTPATIENT
Start: 2021-06-05 | End: 2021-06-06

## 2021-06-05 RX ORDER — 0.9 % SODIUM CHLORIDE 0.9 %
1000 INTRAVENOUS SOLUTION INTRAVENOUS ONCE
Status: COMPLETED | OUTPATIENT
Start: 2021-06-05 | End: 2021-06-05

## 2021-06-05 RX ORDER — KIT FOR THE PREPARATION OF TECHNETIUM TC 99M PENTETATE 20 MG/1
3 INJECTION, POWDER, LYOPHILIZED, FOR SOLUTION INTRAVENOUS; RESPIRATORY (INHALATION)
Status: COMPLETED | OUTPATIENT
Start: 2021-06-05 | End: 2021-06-05

## 2021-06-05 RX ORDER — OXYCODONE HYDROCHLORIDE AND ACETAMINOPHEN 5; 325 MG/1; MG/1
2 TABLET ORAL ONCE
Status: COMPLETED | OUTPATIENT
Start: 2021-06-05 | End: 2021-06-05

## 2021-06-05 RX ORDER — FENTANYL CITRATE 50 UG/ML
50 INJECTION, SOLUTION INTRAMUSCULAR; INTRAVENOUS ONCE
Status: COMPLETED | OUTPATIENT
Start: 2021-06-05 | End: 2021-06-05

## 2021-06-05 RX ADMIN — KIT FOR THE PREPARATION OF TECHNETIUM TC 99M PENTETATE 3 MILLICURIE: 20 INJECTION, POWDER, LYOPHILIZED, FOR SOLUTION INTRAVENOUS; RESPIRATORY (INHALATION) at 23:38

## 2021-06-05 RX ADMIN — OXYCODONE HYDROCHLORIDE AND ACETAMINOPHEN 2 TABLET: 5; 325 TABLET ORAL at 21:20

## 2021-06-05 RX ADMIN — FENTANYL CITRATE 50 MCG: 50 INJECTION, SOLUTION INTRAMUSCULAR; INTRAVENOUS at 19:20

## 2021-06-05 RX ADMIN — SODIUM CHLORIDE: 9 INJECTION, SOLUTION INTRAVENOUS at 21:20

## 2021-06-05 RX ADMIN — Medication 5.5 MILLICURIE: at 23:50

## 2021-06-05 RX ADMIN — SODIUM CHLORIDE 1000 ML: 9 INJECTION, SOLUTION INTRAVENOUS at 19:19

## 2021-06-05 ASSESSMENT — PAIN DESCRIPTION - PAIN TYPE: TYPE: ACUTE PAIN

## 2021-06-05 ASSESSMENT — PAIN DESCRIPTION - LOCATION: LOCATION: GROIN

## 2021-06-05 ASSESSMENT — PAIN SCALES - GENERAL
PAINLEVEL_OUTOF10: 9
PAINLEVEL_OUTOF10: 8
PAINLEVEL_OUTOF10: 7

## 2021-06-05 NOTE — ED PROVIDER NOTES
Emergency Department Encounter    Patient: Carlos Leong  MRN: [id number]  : 1975  Date of Evaluation: 2021  ED Provider:  Joyce Mcgowan DO    Triage Chief Complaint:   Wound Check (Open wound to scrotum. Had a coughing fit at home and was incontinent and got stool in his wound)    [place]:  Carlos Leong is a 55 y.o. male that presents complaining of passing out. He reports passing out twice last time was today he was coughing and accidentally defecated on himself a bit and he was getting to the bathroom and back and while he was up he passed out. He denies any palpitations denies any chest pain does report he has had some preceding shortness of breath. Some cough. Patient recently discharge from the hospital maybe a week ago having an aggressive perineal infection that was treated with surgery he reports across his scrotum and he is on IV antibiotics and getting wound changes at home. States his blood sugars been fairly well controlled recently has been eating and drinking normally moving his bowels. Urinating.           ROS - see HPI, below listed is current ROS at time of my eval:  General:  No fevers, no chills, no weakness,+ syncope prior to arrival today  Eyes:  No recent vison changes, no discharge  ENT:  No sore throat, no nasal congestion, no hearing changes  Cardiovascular:  No chest pain, no palpitations  Respiratory: Some shortness of breath, some dry cough cough, no wheezing  Gastrointestinal:  No pain, no nausea, no vomiting, some loose stools  Musculoskeletal:  No muscle pain, no joint pain  Skin: Open wound of the scrotum that is having wound packings and wound changes, also reports wound to the left foot that is getting attention  Neurologic:  No speech problems, no headache, no extremity numbness, no extremity tingling, no extremity weakness  Genitourinary:  No dysuria, no hematuria  Endocrine:  No unexpected weight gain, no unexpected weight loss  Extremities:  no edema, no  Difficulty of Paying Living Expenses:    Food Insecurity:     Worried About Running Out of Food in the Last Year:     Ran Out of Food in the Last Year:    Transportation Needs:     Lack of Transportation (Medical):  Lack of Transportation (Non-Medical):    Physical Activity:     Days of Exercise per Week:     Minutes of Exercise per Session:    Stress:     Feeling of Stress :    Social Connections:     Frequency of Communication with Friends and Family:     Frequency of Social Gatherings with Friends and Family:     Attends Restoration Services:     Active Member of Clubs or Organizations:     Attends Club or Organization Meetings:     Marital Status:    Intimate Partner Violence:     Fear of Current or Ex-Partner:     Emotionally Abused:     Physically Abused:     Sexually Abused:      Current Facility-Administered Medications   Medication Dose Route Frequency Provider Last Rate Last Admin    0.9 % sodium chloride infusion   Intravenous Continuous Jada Felipe  mL/hr at 06/05/21 2120 New Bag at 06/05/21 2120    enoxaparin (LOVENOX) injection 120 mg  1 mg/kg Subcutaneous Once Jada Felipe DO         Current Outpatient Medications   Medication Sig Dispense Refill    oxyCODONE-acetaminophen (PERCOCET) 5-325 MG per tablet Take 1 tablet by mouth every 6 hours as needed for Pain for up to 3 days. Intended supply: 3 days. Take lowest dose possible to manage pain 12 tablet 0    sertraline (ZOLOFT) 50 MG tablet Take 2 tablets by mouth daily 30 tablet 3    vancomycin (VANCOCIN) infusion Infuse 1,500 mg intravenously every 24 hours for 21 days Compound per protocol.  68053 mg 0    metroNIDAZOLE (FLAGYL) 500 MG tablet Take 1 tablet by mouth 3 times daily for 14 days 42 tablet 0    chlorthalidone (HYGROTON) 25 MG tablet Take 1 tablet by mouth daily 30 tablet 3    tamsulosin (FLOMAX) 0.4 MG capsule Take 1 capsule by mouth daily 30 capsule 3    amLODIPine-benazepril (LOTREL) 5-20 MG per capsule Take 1 capsule by mouth daily 30 capsule 3    aspirin 81 MG EC tablet Take 1 tablet by mouth daily 30 tablet 3    linagliptin (TRADJENTA) 5 MG tablet Take 1 tablet by mouth daily 30 tablet 5    ondansetron (ZOFRAN) 4 MG tablet Take 1 tablet by mouth every 8 hours as needed for Nausea or Vomiting 20 tablet 0    Lancets MISC 1 each by Does not apply route daily 100 each 3    glucose monitoring kit (FREESTYLE) monitoring kit 1 each by Does not apply route once for 1 dose. 1 kit 0     Allergies   Allergen Reactions    Adhesive Tape Rash    Doxycycline Nausea And Vomiting    Reglan [Metoclopramide] Anxiety       Nursing Notes Reviewed    /71   Pulse 78   Temp 98 °F (36.7 °C) (Oral)   Resp 20   Ht 5' 9\" (1.753 m)   Wt 270 lb (122.5 kg)   SpO2 100%   BMI 39.87 kg/m²     Physical Exam:  Triage VS:    ED Triage Vitals   Enc Vitals Group      BP       Pulse       Resp       Temp       Temp src       SpO2       Weight       Height       Head Circumference       Peak Flow       Pain Score       Pain Loc       Pain Edu? Excl. in [address]? My pulse ox interpretation is - normal    General appearance: Somewhat pale, interactive speech is normal and fluent cranial nerves are symmetric. Looks somewhat chronically ill. No increased work of breathing  Skin:  Warm. Dry. No Rash noted  Eye:    Grossly normal with no injection, vision is grossly normal, EOMI Bilaterally, no scleral icterus noted  Ears, nose, mouth and throat:  Oral mucosa moist, pink. Neck:  Trachea midline. No masses, No JVD noted    Heart:  Regular rate and rhythm, no extra heart sounds. No murmurs noted  Perfusion:  intact- good pulses in both Lower extremities, no cyanosis, pulses are present in both feet  Respiratory:  Lungs clear to auscultation bilaterally. Respirations nonlabored. Abdominal:  Normal bowel sounds. Soft. Non distended. Non-tender.     Back: No midline tenderness noted, no soft tissue changes  Extremity:  No bedside also attending to the patient 10:22 PM       Chest x-ray read by radiology without acute findings. Troponin is mildly elevated-no EKG changes patient without chest pain. D-dimer is elevated and patient has slightly worsened renal dysfunction creatinine of 2.5. I did order a VQ scan start the patient empirically on Lovenox. I did discuss with the primary care physician who is agreeable to admit the patient. In brief evaluation of the groin wounds they appear clean healing well no surrounding erythema. Clinical Impression:  1. Syncope and collapse    2. Shortness of breath    3. Elevated troponin      Disposition referral (if applicable):  No follow-up provider specified. Disposition medications (if applicable):  New Prescriptions    No medications on file     ED Provider Disposition Time  DISPOSITION Admitted 06/05/2021 10:17:19 PM      Comment: Please note this report has been produced using speech recognition software and may contain errors related to that system including errors in grammar, punctuation, and spelling, as well as words and phrases that may be inappropriate. Efforts were made to edit the dictations.         Azar Franco,   06/05/21 [id number]

## 2021-06-05 NOTE — ED TRIAGE NOTES
Pt admits to coughing and being incontinent of stool while at home. Pt was ambulating at home with wife to clean self when pt lost consciousness and fell onto bottom and onto area that has a chronic wound. Pt and wife also concerned that wound has stool in it. Pt states he has had several episodes of passing out while standing or changing positions and not been evaluated for this.

## 2021-06-06 LAB
ANION GAP SERPL CALCULATED.3IONS-SCNC: 10 MMOL/L (ref 4–16)
BASOPHILS ABSOLUTE: 0 K/CU MM
BASOPHILS RELATIVE PERCENT: 0.6 % (ref 0–1)
BUN BLDV-MCNC: 49 MG/DL (ref 6–23)
CALCIUM SERPL-MCNC: 7.9 MG/DL (ref 8.3–10.6)
CHLORIDE BLD-SCNC: 107 MMOL/L (ref 99–110)
CHP ED QC CHECK: NORMAL
CO2: 17 MMOL/L (ref 21–32)
CREAT SERPL-MCNC: 2.2 MG/DL (ref 0.9–1.3)
DIFFERENTIAL TYPE: ABNORMAL
DOSE AMOUNT: NORMAL
DOSE TIME: NORMAL
EKG ATRIAL RATE: 85 BPM
EKG DIAGNOSIS: NORMAL
EKG P AXIS: 11 DEGREES
EKG P-R INTERVAL: 172 MS
EKG Q-T INTERVAL: 378 MS
EKG QRS DURATION: 80 MS
EKG QTC CALCULATION (BAZETT): 449 MS
EKG R AXIS: 28 DEGREES
EKG T AXIS: 47 DEGREES
EKG VENTRICULAR RATE: 85 BPM
EOSINOPHILS ABSOLUTE: 0.1 K/CU MM
EOSINOPHILS RELATIVE PERCENT: 2.8 % (ref 0–3)
ESTIMATED AVERAGE GLUCOSE: 200 MG/DL
GFR AFRICAN AMERICAN: 39 ML/MIN/1.73M2
GFR NON-AFRICAN AMERICAN: 32 ML/MIN/1.73M2
GLUCOSE BLD-MCNC: 118 MG/DL
GLUCOSE BLD-MCNC: 118 MG/DL (ref 70–99)
GLUCOSE BLD-MCNC: 120 MG/DL (ref 70–99)
GLUCOSE BLD-MCNC: 125 MG/DL (ref 70–99)
GLUCOSE BLD-MCNC: 142 MG/DL (ref 70–99)
GLUCOSE BLD-MCNC: 171 MG/DL (ref 70–99)
GLUCOSE BLD-MCNC: 206 MG/DL (ref 70–99)
HBA1C MFR BLD: 8.6 % (ref 4.2–6.3)
HCT VFR BLD CALC: 26.7 % (ref 42–52)
HEMOGLOBIN: 8.9 GM/DL (ref 13.5–18)
IMMATURE NEUTROPHIL %: 0.4 % (ref 0–0.43)
LYMPHOCYTES ABSOLUTE: 0.7 K/CU MM
LYMPHOCYTES RELATIVE PERCENT: 15.9 % (ref 24–44)
MCH RBC QN AUTO: 29.9 PG (ref 27–31)
MCHC RBC AUTO-ENTMCNC: 33.3 % (ref 32–36)
MCV RBC AUTO: 89.6 FL (ref 78–100)
MONOCYTES ABSOLUTE: 0.5 K/CU MM
MONOCYTES RELATIVE PERCENT: 11.4 % (ref 0–4)
NUCLEATED RBC %: 0 %
PDW BLD-RTO: 14.3 % (ref 11.7–14.9)
PLATELET # BLD: 315 K/CU MM (ref 140–440)
PMV BLD AUTO: 9 FL (ref 7.5–11.1)
POTASSIUM SERPL-SCNC: 5.1 MMOL/L (ref 3.5–5.1)
RBC # BLD: 2.98 M/CU MM (ref 4.6–6.2)
SEGMENTED NEUTROPHILS ABSOLUTE COUNT: 3.2 K/CU MM
SEGMENTED NEUTROPHILS RELATIVE PERCENT: 68.9 % (ref 36–66)
SODIUM BLD-SCNC: 134 MMOL/L (ref 135–145)
TOTAL IMMATURE NEUTOROPHIL: 0.02 K/CU MM
TOTAL NUCLEATED RBC: 0 K/CU MM
VANCOMYCIN RANDOM: 20.5 UG/ML
WBC # BLD: 4.7 K/CU MM (ref 4–10.5)

## 2021-06-06 PROCEDURE — 99221 1ST HOSP IP/OBS SF/LOW 40: CPT | Performed by: INTERNAL MEDICINE

## 2021-06-06 PROCEDURE — 2580000003 HC RX 258: Performed by: FAMILY MEDICINE

## 2021-06-06 PROCEDURE — 36415 COLL VENOUS BLD VENIPUNCTURE: CPT

## 2021-06-06 PROCEDURE — 6360000002 HC RX W HCPCS: Performed by: EMERGENCY MEDICINE

## 2021-06-06 PROCEDURE — 6370000000 HC RX 637 (ALT 250 FOR IP): Performed by: FAMILY MEDICINE

## 2021-06-06 PROCEDURE — 82962 GLUCOSE BLOOD TEST: CPT

## 2021-06-06 PROCEDURE — 85025 COMPLETE CBC W/AUTO DIFF WBC: CPT

## 2021-06-06 PROCEDURE — 1200000000 HC SEMI PRIVATE

## 2021-06-06 PROCEDURE — 6360000002 HC RX W HCPCS: Performed by: FAMILY MEDICINE

## 2021-06-06 PROCEDURE — 85027 COMPLETE CBC AUTOMATED: CPT

## 2021-06-06 PROCEDURE — 93010 ELECTROCARDIOGRAM REPORT: CPT | Performed by: INTERNAL MEDICINE

## 2021-06-06 PROCEDURE — 83036 HEMOGLOBIN GLYCOSYLATED A1C: CPT

## 2021-06-06 PROCEDURE — 80048 BASIC METABOLIC PNL TOTAL CA: CPT

## 2021-06-06 PROCEDURE — 80202 ASSAY OF VANCOMYCIN: CPT

## 2021-06-06 RX ORDER — MORPHINE SULFATE 2 MG/ML
2 INJECTION, SOLUTION INTRAMUSCULAR; INTRAVENOUS EVERY 4 HOURS PRN
Status: DISCONTINUED | OUTPATIENT
Start: 2021-06-06 | End: 2021-06-15 | Stop reason: HOSPADM

## 2021-06-06 RX ORDER — SODIUM CHLORIDE 0.9 % (FLUSH) 0.9 %
5-40 SYRINGE (ML) INJECTION PRN
Status: DISCONTINUED | OUTPATIENT
Start: 2021-06-06 | End: 2021-06-15 | Stop reason: HOSPADM

## 2021-06-06 RX ORDER — OXYCODONE HYDROCHLORIDE AND ACETAMINOPHEN 5; 325 MG/1; MG/1
1 TABLET ORAL EVERY 6 HOURS PRN
Status: DISCONTINUED | OUTPATIENT
Start: 2021-06-06 | End: 2021-06-15 | Stop reason: HOSPADM

## 2021-06-06 RX ORDER — SODIUM CHLORIDE 9 MG/ML
INJECTION, SOLUTION INTRAVENOUS CONTINUOUS
Status: DISCONTINUED | OUTPATIENT
Start: 2021-06-06 | End: 2021-06-08

## 2021-06-06 RX ORDER — ZOLPIDEM TARTRATE 5 MG/1
5 TABLET ORAL NIGHTLY PRN
Status: DISCONTINUED | OUTPATIENT
Start: 2021-06-06 | End: 2021-06-15 | Stop reason: HOSPADM

## 2021-06-06 RX ORDER — SODIUM CHLORIDE 9 MG/ML
25 INJECTION, SOLUTION INTRAVENOUS PRN
Status: DISCONTINUED | OUTPATIENT
Start: 2021-06-06 | End: 2021-06-15 | Stop reason: HOSPADM

## 2021-06-06 RX ORDER — TAMSULOSIN HYDROCHLORIDE 0.4 MG/1
0.4 CAPSULE ORAL DAILY
Status: DISCONTINUED | OUTPATIENT
Start: 2021-06-06 | End: 2021-06-15 | Stop reason: HOSPADM

## 2021-06-06 RX ORDER — SERTRALINE HYDROCHLORIDE 100 MG/1
100 TABLET, FILM COATED ORAL DAILY
Status: DISCONTINUED | OUTPATIENT
Start: 2021-06-06 | End: 2021-06-15 | Stop reason: HOSPADM

## 2021-06-06 RX ORDER — HEPARIN SODIUM 5000 [USP'U]/ML
5000 INJECTION, SOLUTION INTRAVENOUS; SUBCUTANEOUS EVERY 8 HOURS SCHEDULED
Status: DISCONTINUED | OUTPATIENT
Start: 2021-06-06 | End: 2021-06-15 | Stop reason: HOSPADM

## 2021-06-06 RX ORDER — POLYETHYLENE GLYCOL 3350 17 G/17G
17 POWDER, FOR SOLUTION ORAL DAILY PRN
Status: DISCONTINUED | OUTPATIENT
Start: 2021-06-06 | End: 2021-06-15 | Stop reason: HOSPADM

## 2021-06-06 RX ORDER — ACETAMINOPHEN 650 MG/1
650 SUPPOSITORY RECTAL EVERY 6 HOURS PRN
Status: DISCONTINUED | OUTPATIENT
Start: 2021-06-06 | End: 2021-06-15 | Stop reason: HOSPADM

## 2021-06-06 RX ORDER — POTASSIUM CHLORIDE 7.45 MG/ML
10 INJECTION INTRAVENOUS PRN
Status: DISCONTINUED | OUTPATIENT
Start: 2021-06-06 | End: 2021-06-15 | Stop reason: HOSPADM

## 2021-06-06 RX ORDER — INSULIN GLARGINE 100 [IU]/ML
0.15 INJECTION, SOLUTION SUBCUTANEOUS NIGHTLY
Status: DISCONTINUED | OUTPATIENT
Start: 2021-06-06 | End: 2021-06-12

## 2021-06-06 RX ORDER — GUAIFENESIN/DEXTROMETHORPHAN 100-10MG/5
10 SYRUP ORAL EVERY 6 HOURS PRN
Status: DISCONTINUED | OUTPATIENT
Start: 2021-06-06 | End: 2021-06-15 | Stop reason: HOSPADM

## 2021-06-06 RX ORDER — ACETAMINOPHEN 325 MG/1
650 TABLET ORAL EVERY 6 HOURS PRN
Status: DISCONTINUED | OUTPATIENT
Start: 2021-06-06 | End: 2021-06-15 | Stop reason: HOSPADM

## 2021-06-06 RX ORDER — DEXTROSE MONOHYDRATE 50 MG/ML
100 INJECTION, SOLUTION INTRAVENOUS PRN
Status: DISCONTINUED | OUTPATIENT
Start: 2021-06-06 | End: 2021-06-15 | Stop reason: HOSPADM

## 2021-06-06 RX ORDER — GUAIFENESIN 600 MG/1
1200 TABLET, EXTENDED RELEASE ORAL 2 TIMES DAILY
Status: DISCONTINUED | OUTPATIENT
Start: 2021-06-06 | End: 2021-06-12

## 2021-06-06 RX ORDER — DEXTROSE MONOHYDRATE 25 G/50ML
12.5 INJECTION, SOLUTION INTRAVENOUS PRN
Status: DISCONTINUED | OUTPATIENT
Start: 2021-06-06 | End: 2021-06-15 | Stop reason: HOSPADM

## 2021-06-06 RX ORDER — SODIUM CHLORIDE 0.9 % (FLUSH) 0.9 %
5-40 SYRINGE (ML) INJECTION EVERY 12 HOURS SCHEDULED
Status: DISCONTINUED | OUTPATIENT
Start: 2021-06-06 | End: 2021-06-15 | Stop reason: HOSPADM

## 2021-06-06 RX ORDER — METRONIDAZOLE 250 MG/1
500 TABLET ORAL 3 TIMES DAILY
Status: DISCONTINUED | OUTPATIENT
Start: 2021-06-06 | End: 2021-06-08

## 2021-06-06 RX ORDER — ASPIRIN 81 MG/1
81 TABLET ORAL DAILY
Status: DISCONTINUED | OUTPATIENT
Start: 2021-06-06 | End: 2021-06-15 | Stop reason: HOSPADM

## 2021-06-06 RX ORDER — SODIUM PHOSPHATE, DIBASIC AND SODIUM PHOSPHATE, MONOBASIC 7; 19 G/133ML; G/133ML
1 ENEMA RECTAL DAILY PRN
Status: DISCONTINUED | OUTPATIENT
Start: 2021-06-06 | End: 2021-06-15 | Stop reason: HOSPADM

## 2021-06-06 RX ORDER — POTASSIUM CHLORIDE 20 MEQ/1
40 TABLET, EXTENDED RELEASE ORAL PRN
Status: DISCONTINUED | OUTPATIENT
Start: 2021-06-06 | End: 2021-06-15 | Stop reason: HOSPADM

## 2021-06-06 RX ORDER — BENZONATATE 100 MG/1
200 CAPSULE ORAL 3 TIMES DAILY PRN
Status: DISCONTINUED | OUTPATIENT
Start: 2021-06-06 | End: 2021-06-15 | Stop reason: HOSPADM

## 2021-06-06 RX ORDER — ONDANSETRON 2 MG/ML
4 INJECTION INTRAMUSCULAR; INTRAVENOUS EVERY 6 HOURS PRN
Status: DISCONTINUED | OUTPATIENT
Start: 2021-06-06 | End: 2021-06-15 | Stop reason: HOSPADM

## 2021-06-06 RX ORDER — ONDANSETRON 4 MG/1
4 TABLET, ORALLY DISINTEGRATING ORAL EVERY 8 HOURS PRN
Status: DISCONTINUED | OUTPATIENT
Start: 2021-06-06 | End: 2021-06-15 | Stop reason: HOSPADM

## 2021-06-06 RX ORDER — PROMETHAZINE HYDROCHLORIDE 25 MG/1
12.5 TABLET ORAL EVERY 6 HOURS PRN
Status: DISCONTINUED | OUTPATIENT
Start: 2021-06-06 | End: 2021-06-15 | Stop reason: HOSPADM

## 2021-06-06 RX ORDER — CALCIUM CARBONATE 200(500)MG
750 TABLET,CHEWABLE ORAL 3 TIMES DAILY PRN
Status: DISCONTINUED | OUTPATIENT
Start: 2021-06-06 | End: 2021-06-15 | Stop reason: HOSPADM

## 2021-06-06 RX ORDER — NICOTINE POLACRILEX 4 MG
15 LOZENGE BUCCAL PRN
Status: DISCONTINUED | OUTPATIENT
Start: 2021-06-06 | End: 2021-06-15 | Stop reason: HOSPADM

## 2021-06-06 RX ORDER — MAGNESIUM SULFATE IN WATER 40 MG/ML
2000 INJECTION, SOLUTION INTRAVENOUS PRN
Status: DISCONTINUED | OUTPATIENT
Start: 2021-06-06 | End: 2021-06-15 | Stop reason: HOSPADM

## 2021-06-06 RX ADMIN — HEPARIN SODIUM 5000 UNITS: 5000 INJECTION INTRAVENOUS; SUBCUTANEOUS at 16:33

## 2021-06-06 RX ADMIN — METRONIDAZOLE 500 MG: 250 TABLET ORAL at 22:30

## 2021-06-06 RX ADMIN — GUAIFENESIN 1200 MG: 600 TABLET, EXTENDED RELEASE ORAL at 22:30

## 2021-06-06 RX ADMIN — METRONIDAZOLE 500 MG: 250 TABLET ORAL at 16:32

## 2021-06-06 RX ADMIN — OXYCODONE HYDROCHLORIDE AND ACETAMINOPHEN 1 TABLET: 5; 325 TABLET ORAL at 02:51

## 2021-06-06 RX ADMIN — Medication 2 MG: at 18:18

## 2021-06-06 RX ADMIN — GUAIFENESIN 1200 MG: 600 TABLET, EXTENDED RELEASE ORAL at 08:35

## 2021-06-06 RX ADMIN — INSULIN LISPRO 2 UNITS: 100 INJECTION, SOLUTION INTRAVENOUS; SUBCUTANEOUS at 18:25

## 2021-06-06 RX ADMIN — ENOXAPARIN SODIUM 120 MG: 120 INJECTION SUBCUTANEOUS at 01:28

## 2021-06-06 RX ADMIN — SERTRALINE HYDROCHLORIDE 100 MG: 100 TABLET ORAL at 08:35

## 2021-06-06 RX ADMIN — SODIUM CHLORIDE, PRESERVATIVE FREE 10 ML: 5 INJECTION INTRAVENOUS at 09:00

## 2021-06-06 RX ADMIN — OXYCODONE HYDROCHLORIDE AND ACETAMINOPHEN 1 TABLET: 5; 325 TABLET ORAL at 13:01

## 2021-06-06 RX ADMIN — GUAIFENESIN 1200 MG: 600 TABLET, EXTENDED RELEASE ORAL at 01:50

## 2021-06-06 RX ADMIN — INSULIN GLARGINE 18 UNITS: 100 INJECTION, SOLUTION SUBCUTANEOUS at 22:27

## 2021-06-06 RX ADMIN — METRONIDAZOLE 500 MG: 250 TABLET ORAL at 08:34

## 2021-06-06 RX ADMIN — OXYCODONE HYDROCHLORIDE AND ACETAMINOPHEN 1 TABLET: 5; 325 TABLET ORAL at 22:30

## 2021-06-06 RX ADMIN — ASPIRIN 81 MG: 81 TABLET, COATED ORAL at 08:34

## 2021-06-06 RX ADMIN — HEPARIN SODIUM 5000 UNITS: 5000 INJECTION INTRAVENOUS; SUBCUTANEOUS at 22:28

## 2021-06-06 RX ADMIN — ONDANSETRON 4 MG: 2 INJECTION INTRAMUSCULAR; INTRAVENOUS at 13:01

## 2021-06-06 RX ADMIN — SODIUM CHLORIDE, PRESERVATIVE FREE 10 ML: 5 INJECTION INTRAVENOUS at 22:30

## 2021-06-06 RX ADMIN — SODIUM CHLORIDE: 9 INJECTION, SOLUTION INTRAVENOUS at 01:28

## 2021-06-06 RX ADMIN — TAMSULOSIN HYDROCHLORIDE 0.4 MG: 0.4 CAPSULE ORAL at 08:35

## 2021-06-06 RX ADMIN — INSULIN GLARGINE 18 UNITS: 100 INJECTION, SOLUTION SUBCUTANEOUS at 01:51

## 2021-06-06 ASSESSMENT — PAIN DESCRIPTION - DESCRIPTORS
DESCRIPTORS: CONSTANT;DISCOMFORT
DESCRIPTORS: CONSTANT;DISCOMFORT

## 2021-06-06 ASSESSMENT — PAIN SCALES - GENERAL
PAINLEVEL_OUTOF10: 7
PAINLEVEL_OUTOF10: 9
PAINLEVEL_OUTOF10: 9
PAINLEVEL_OUTOF10: 8

## 2021-06-06 ASSESSMENT — PAIN DESCRIPTION - PROGRESSION
CLINICAL_PROGRESSION: GRADUALLY WORSENING

## 2021-06-06 ASSESSMENT — PAIN DESCRIPTION - LOCATION
LOCATION: SCROTUM
LOCATION: SCROTUM

## 2021-06-06 ASSESSMENT — PAIN DESCRIPTION - ONSET
ONSET: ON-GOING
ONSET: ON-GOING

## 2021-06-06 ASSESSMENT — PAIN DESCRIPTION - PAIN TYPE
TYPE: CHRONIC PAIN
TYPE: CHRONIC PAIN

## 2021-06-06 ASSESSMENT — PAIN DESCRIPTION - FREQUENCY
FREQUENCY: CONTINUOUS
FREQUENCY: CONTINUOUS

## 2021-06-06 NOTE — ED NOTES
Dr Huizar Adjutant aware of results of orthostatic vital signs.      [address], RN  06/06/21 [id number]

## 2021-06-06 NOTE — PROGRESS NOTES
Skin assessment completed with Betty. Wound to scrotum noted with wet to dry dressing. Left plantar ulcer, no drainage. Right callous below big toe, no drainage.

## 2021-06-06 NOTE — H&P
Irene abscess was treated   History obtained from patient . In ER lab data revealed   And  XR . And   CT revealed   History obtained from patient . REVIEW OF SYSTEMS:   Constitutional: Negative for fever,chills . ENT: Negative for rhinorrhea,  sore throat. Respiratory: Negative for cough, shortness of breath,wheezing  Cardiovascular: Negative for chest pain, palpitations   Gastrointestinal: Negative for Abdominal pain, nausea, vomiting, diarrhea  Genitourinary: Negative for polyuria, dysuria   Hematologic/Lymphatic: Negative for bleeding tendency, easy bruising  Musculoskeletal: Negative for myalgias and arthralgias  Neurologic: + syncope , Negative for confusion,dysarthria. Skin: Negative for itching,rash  Psychiatric: Negative for depression,anxiety, agitation. Endocrine: Negative for polydipsia,polyuria,heat /cold intolerance. 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 Past Surgical History:   has a past surgical history that includes Cardiac catheterization (2003, 2013); Tonsillectomy and adenoidectomy (1988); Upper gastrointestinal endoscopy (06/2/2011); Colonoscopy (6/2/2011); Nose surgery (1993); skin biopsy (N/A, [id number]); other surgical history (Right, 05/22/2015); Toe amputation;  Abdomen surgery; other surgical history (12/04/2017); pr deep incis foot bone infectn (Left, 9/22/2018); Upper gastrointestinal endoscopy (N/A, 1/12/2019); Toe amputation (Right, 3/23/2019); Toe amputation (Right, 8/6/2019); Scrotal surgery (N/A, 5/15/2021); and Scrotal surgery (N/A, 5/16/2021). Medications:  No current facility-administered medications on file prior to encounter. Current Outpatient Medications on File Prior to Encounter   Medication Sig Dispense Refill    oxyCODONE-acetaminophen (PERCOCET) 5-325 MG per tablet Take 1 tablet by mouth every 6 hours as needed for Pain for up to 3 days. Intended supply: 3 days. Take lowest dose possible to manage pain 12 tablet 0    sertraline (ZOLOFT) 50 MG tablet Take 2 tablets by mouth daily 30 tablet 3    vancomycin (VANCOCIN) infusion Infuse 1,500 mg intravenously every 24 hours for 21 days Compound per protocol. 81935 mg 0    metroNIDAZOLE (FLAGYL) 500 MG tablet Take 1 tablet by mouth 3 times daily for 14 days 42 tablet 0    chlorthalidone (HYGROTON) 25 MG tablet Take 1 tablet by mouth daily 30 tablet 3    tamsulosin (FLOMAX) 0.4 MG capsule Take 1 capsule by mouth daily 30 capsule 3    amLODIPine-benazepril (LOTREL) 5-20 MG per capsule Take 1 capsule by mouth daily 30 capsule 3    aspirin 81 MG EC tablet Take 1 tablet by mouth daily 30 tablet 3    linagliptin (TRADJENTA) 5 MG tablet Take 1 tablet by mouth daily 30 tablet 5    ondansetron (ZOFRAN) 4 MG tablet Take 1 tablet by mouth every 8 hours as needed for Nausea or Vomiting 20 tablet 0    Lancets MISC 1 each by Does not apply route daily 100 each 3    glucose monitoring kit (FREESTYLE) monitoring kit 1 each by Does not apply route once for 1 dose. 1 kit 0       Allergies: Allergies   Allergen Reactions    Adhesive Tape Rash    Doxycycline Nausea And Vomiting    Reglan [Metoclopramide] Anxiety        Social History:   reports that he quit smoking about 3 years ago.  He quit after 20.00 years of use. He has never used smokeless tobacco. He reports current alcohol use. He reports current drug use. Frequency: 7.00 times per week. Drug: Marijuana. Family History:  family history includes ADHD in his daughter; Bleeding Prob in his mother; Cancer in his mother; Diabetes in his father and sister; Heart Disease in his father; High Blood Pressure in his father, sister, and another family member; Kidney Disease in his father; Mental Illness in his sister and another family member; Obesity in his father and sister; Other in his son; Stroke in his mother. ,     Immunization History   Administered Date(s) Administered    Influenza 10/15/2013    Influenza, Amarjit Hamilton, 6 mo and older, IM, PF (Flulaval, Fluarix) 09/19/2018    Pneumococcal Polysaccharide ([id number]) 05/02/2013       Physical Exam:  /71   Pulse 78   Temp 98 °F (36.7 °C) (Oral)   Resp 20   Ht 5' 9\" (1.753 m)   Wt 270 lb (122.5 kg)   SpO2 100%   BMI 39.87 kg/m²     General appearance:  + distress . Well nourished  Eyes: Sclera clear, pupils equal  Cardiovascular: Regular rhythm, normal S1, S2. No edema in lower extremities  Respiratory: Clear to auscultation bilaterally, no wheeze, good inspiratory effort  Gastrointestinal: Abdomen soft, non-tender, not distended, normal bowel sounds  Musculoskeletal: No cyanosis in digits, neck supple  Neurology: Cranial nerves grossly intact. Alert and oriented . No speech or motor deficits  Psychiatry: Appropriate affect.  Not agitated  Skin: Warm, dry, normal turgor, no rash    Labs:  CBC:   Lab Results   Component Value Date    WBC 7.2 06/05/2021    RBC 3.16 06/05/2021    HGB 9.3 06/05/2021    HCT 28.1 06/05/2021    MCV 88.9 06/05/2021    MCH 29.4 06/05/2021    MCHC 33.1 06/05/2021    RDW 13.9 06/05/2021     06/05/2021    MPV 9.1 06/05/2021     BMP:    Lab Results   Component Value Date     06/05/2021    K 4.4 06/05/2021     06/05/2021    CO2 20 06/05/2021    BUN 51 06/05/2021 CREATININE 2.5 06/05/2021    CALCIUM 8.4 06/05/2021    GFRAA 34 06/05/2021    LABGLOM 28 06/05/2021    GLUCOSE 158 06/05/2021       Chest Xray:   EKG:    I visualized CXR images and EKG strips      Patient Active Problem List   Diagnosis Code    Hiatal hernia K44.9    Diabetes mellitus type 2, improved controlled E11.65    Diabetic neuropathy ([facility]) E11.40    Panic attack F41.0    Anxiety associated with depression F41.8    Neck pain M54.2    DANIELA (obstructive sleep apnea) G47.33    Urinary frequency R35.0    Bilateral carpal tunnel syndrome- left worse than right G56.03    Hyperlipidemia with target LDL less than 100 E78.5    Essential hypertension I10    Bronchitis J40    Osteomyelitis ([address] M86.9    Diabetic ulcer of left midfoot ([address] E11.621, L97.429    WD-Diabetic ulcer of left midfoot associated with type 2 diabetes mellitus, with fat layer exposed ([address] E11.621, L97.422    Abscess U85.90    Periumbilical hernia B37.1    WD-Wound, surgical, infected, initial encounter [id number]    Sepsis ([address] A41.9    Cellulitis of left foot L03. 116    Constipation K59.00    Intractable nausea and vomiting R11.2    Uncontrolled type 2 diabetes mellitus ([facility]) E11.65    Nausea and vomiting R11.2    Diabetic foot infection ([facility]) E11.628, L08.9    Acute renal failure (ARF) ([facility]) N17.9    Osteomyelitis of fourth toe of right foot ([facility]) M86.9    Cellulitis L03.90    Cellulitis of left arm L03.114    Cellulitis, scrotum N49.2    Acute epididymitis N45.1    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    Recurrent major depressive disorder, in full remission ([address] F33.42    Generalized anxiety disorder F41.1    Morbid obesity with body mass index (BMI) of 40.0 to 44.9 in adult ([facility]) E66.01, Z68.41    Necrotizing fasciitis ([address] M72.6    Syncope and collapse R55         Active Hospital Problems    Diagnosis     Syncope and collapse [R55]    DM II uncontrolled   ARF on CKD   Depression   Recent h/o Irene abscess s/p debridement on IV Vancomycin   Elevated troponin       Assessment/Plan:   Tele   IVF , monitor renal function , hold diuretics , nephro consul t  cardiac markers, cardiologist consult   Glucose control, insulin coverage   Cont IV ABX Vancomycin and PO flagyl   Pain control   Home meds , reviewed and resumed as appropriate   Symptoms releif/Pain control  DVT proph           Leah Mccallum MD    6/5/2021 10:17 PM

## 2021-06-06 NOTE — CONSULTS
Chart reviewed  Full note to follow                      Name:  Tonya Martínez /Age/Sex: 1975  (55 y.o. male)   MRN & CSN:  [id number] & [id number] Admission Date/Time: 2021  4:20 PM   Location:  ED21/ED-21 PCP: Bobbi Osler, MD       Hospital Day: 2          Referring physician:  No admitting provider for patient encounter. Reason for consultation:  syncope        Thanks for referral.    Information source: patient    CC; Wound check      HPI:   Thank you for involving me in taking  care of Tonya Martínez who  is a 55 y. o.year  Old male  Presents with  H/o DM, HTN, irene gangrene   , PVD admitted with  An episode of passing ou, tHe reports passing out twice last time was today he was coughing and accidentally defecated on himself a bit and he was getting to the bathroom and back and while he was up he passed out. He denies any palpitations denies any chest pain does report he has had some preceding shortness of breath. has no CP, SOB. 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 Past surgical history:   has a past surgical history that includes Cardiac catheterization (, ); Tonsillectomy and adenoidectomy ();  Upper gastrointestinal endoscopy (2011); Colonoscopy (6/2/2011); Nose surgery (1993); skin biopsy (N/A, [id number]); other surgical history (Right, 05/22/2015); Toe amputation; Abdomen surgery; other surgical history (12/04/2017); pr deep incis foot bone infectn (Left, 9/22/2018); Upper gastrointestinal endoscopy (N/A, 1/12/2019); Toe amputation (Right, 3/23/2019); Toe amputation (Right, 8/6/2019); Scrotal surgery (N/A, 5/15/2021); and Scrotal surgery (N/A, 5/16/2021). Social History:   reports that he quit smoking about 3 years ago. He quit after 20.00 years of use. He has never used smokeless tobacco. He reports current alcohol use. He reports current drug use. Frequency: 7.00 times per week. Drug: Marijuana. Family history:  family history includes ADHD in his daughter; Bleeding Prob in his mother; Cancer in his mother; Diabetes in his father and sister; Heart Disease in his father; High Blood Pressure in his father, sister, and another family member; Kidney Disease in his father; Mental Illness in his sister and another family member; Obesity in his father and sister; Other in his son; Stroke in his mother.     Allergies   Allergen Reactions    Adhesive Tape Rash    Doxycycline Nausea And Vomiting    Reglan [Metoclopramide] Anxiety       ondansetron (ZOFRAN-ODT) disintegrating tablet 4 mg, Q8H PRN  aspirin EC tablet 81 mg, Daily  tamsulosin (FLOMAX) capsule 0.4 mg, Daily  sertraline (ZOLOFT) tablet 100 mg, Daily  metroNIDAZOLE (FLAGYL) tablet 500 mg, TID  oxyCODONE-acetaminophen (PERCOCET) 5-325 MG per tablet 1 tablet, Q6H PRN  glucose (GLUTOSE) 40 % oral gel 15 g, PRN  dextrose 50 % IV solution, PRN  glucagon (rDNA) injection 1 mg, PRN  dextrose 5 % solution, PRN  0.9 % sodium chloride infusion, Continuous  sodium chloride flush 0.9 % injection 5-40 mL, 2 times per day  sodium chloride flush 0.9 % injection 5-40 mL, PRN  0.9 % sodium chloride infusion, PRN  potassium chloride (KLOR-CON M) extended release tablet 40 mEq, PRN   Or  potassium bicarb-citric acid (EFFER-K) effervescent tablet 40 mEq, PRN   Or  potassium chloride 10 mEq/100 mL IVPB (Peripheral Line), PRN  magnesium sulfate 2000 mg in 50 mL IVPB premix, PRN  promethazine (PHENERGAN) tablet 12.5 mg, Q6H PRN   Or  ondansetron (ZOFRAN) injection 4 mg, Q6H PRN  polyethylene glycol (GLYCOLAX) packet 17 g, Daily PRN  fleet rectal enema 1 enema, Daily PRN  acetaminophen (TYLENOL) tablet 650 mg, Q6H PRN   Or  acetaminophen (TYLENOL) suppository 650 mg, Q6H PRN  heparin (porcine) injection 5,000 Units, 3 times per day  insulin glargine (LANTUS) injection vial 18 Units, Nightly  insulin lispro (HUMALOG) injection vial 6 Units, TID WC  insulin lispro (HUMALOG) injection vial 0-6 Units, TID WC  insulin lispro (HUMALOG) injection vial 0-3 Units, Nightly  zolpidem (AMBIEN) tablet 5 mg, Nightly PRN  guaiFENesin-dextromethorphan (ROBITUSSIN DM) 100-10 MG/5ML syrup 10 mL, Q6H PRN  benzonatate (TESSALON) capsule 200 mg, TID PRN  guaiFENesin (MUCINEX) extended release tablet 1,200 mg, BID  calcium carbonate (TUMS) chewable tablet 750 mg, TID PRN  vancomycin (VANCOCIN) intermittent dosing (placeholder), See Admin Instructions      Current Facility-Administered Medications   Medication Dose Route Frequency Provider Last Rate Last Admin    ondansetron (ZOFRAN-ODT) disintegrating tablet 4 mg  4 mg Oral Q8H PRN José Luis Izquierdo MD        aspirin EC tablet 81 mg  81 mg Oral Daily José Luis Izquierdo MD   81 mg at 06/06/21 0834    tamsulosin (FLOMAX) capsule 0.4 mg  0.4 mg Oral Daily José Luis Izquierdo MD   0.4 mg at 06/06/21 0835    sertraline (ZOLOFT) tablet 100 mg  100 mg Oral Daily José Luis Izquierdo MD   100 mg at 06/06/21 0835    metroNIDAZOLE (FLAGYL) tablet 500 mg  500 mg Oral TID Famari Ortega MD   500 mg at 06/06/21 0834    oxyCODONE-acetaminophen (PERCOCET) 5-325 MG per tablet 1 tablet  1 tablet Oral Q6H PRN José Luis Izquierdo MD   1 tablet at 06/06/21 0251    glucose (GLUTOSE) 40 % oral gel 15 g 15 g Oral PRN José Luis Izquierdo MD        dextrose 50 % IV solution  12.5 g Intravenous PRN Renata Deshpande MD        glucagon (rDNA) injection 1 mg  1 mg Intramuscular PRN Renata Deshpande MD        dextrose 5 % solution  100 mL/hr Intravenous PRN Renata Deshpande MD        0.9 % sodium chloride infusion   Intravenous Continuous José Luis Izquierdo  mL/hr at 06/06/21 0128 New Bag at 06/06/21 0128    sodium chloride flush 0.9 % injection 5-40 mL  5-40 mL Intravenous 2 times per day Renata Deshpande MD        sodium chloride flush 0.9 % injection 5-40 mL  5-40 mL Intravenous PRN Renata Deshpande MD        0.9 % sodium chloride infusion  25 mL Intravenous PRN Renata Deshpande MD        potassium chloride (KLOR-CON M) extended release tablet 40 mEq  40 mEq Oral PRN Renata Deshpande MD        Or    potassium bicarb-citric acid (EFFER-K) effervescent tablet 40 mEq  40 mEq Oral PRN Renata Deshpande MD        Or    potassium chloride 10 mEq/100 mL IVPB (Peripheral Line)  10 mEq Intravenous PRN Renata Deshpande MD        magnesium sulfate 2000 mg in 50 mL IVPB premix  2,000 mg Intravenous PRN José Luis Izquierdo MD        promethazine (PHENERGAN) tablet 12.5 mg  12.5 mg Oral Q6H PRN José Luis Izquierdo MD        Or    ondansetron (ZOFRAN) injection 4 mg  4 mg Intravenous Q6H PRN José Luis Izquierdo MD        polyethylene glycol (GLYCOLAX) packet 17 g  17 g Oral Daily PRN José Luis Izquierdo MD        fleet rectal enema 1 enema  1 enema Rectal Daily PRN José Luis Izquierod MD        acetaminophen (TYLENOL) tablet 650 mg  650 mg Oral Q6H PRN Joés Luis Izquierdo MD        Or    acetaminophen (TYLENOL) suppository 650 mg  650 mg Rectal Q6H PRN José Luis Izquierdo MD        heparin (porcine) injection 5,000 Units  5,000 Units Subcutaneous 3 times per day Renata Deshpande MD        insulin glargine (LANTUS) injection vial 18 Units  0.15 Units/kg Subcutaneous Nightly Renata Deshpande MD   18 Units at 06/06/21 0151    insulin lispro (HUMALOG) injection vial 6 Units  0.05 Units/kg Subcutaneous TID  José Luis Izquierdo MD        insulin lispro (HUMALOG) injection vial 0-6 Units  0-6 Units Subcutaneous TID  José Luis Izquierdo MD        insulin lispro (HUMALOG) injection vial 0-3 Units  0-3 Units Subcutaneous Nightly José Luis Izquierdo MD   1 Units at 06/06/21 0151    zolpidem (AMBIEN) tablet 5 mg  5 mg Oral Nightly PRN MD Karmen HarrisaiFENesin-dextromethorphan (ROBITUSSIN DM) 100-10 MG/5ML syrup 10 mL  10 mL Oral Q6H PRN José Luis Izquierdo MD        benzonatate (TESSALON) capsule 200 mg  200 mg Oral TID PRN MD Karmen HarrisaiFENesin (MUCINEX) extended release tablet 1,200 mg  1,200 mg Oral BID José Luis Izquierdo MD   1,200 mg at 06/06/21 0835    calcium carbonate (TUMS) chewable tablet 750 mg  750 mg Oral TID PRN Marisa García MD        vancomycin (VANCOCIN) intermittent dosing (placeholder)   Other See Admin Instructions Marisa García MD         Current Outpatient Medications   Medication Sig Dispense Refill    oxyCODONE-acetaminophen (PERCOCET) 5-325 MG per tablet Take 1 tablet by mouth every 6 hours as needed for Pain for up to 3 days. Intended supply: 3 days. Take lowest dose possible to manage pain 12 tablet 0    sertraline (ZOLOFT) 50 MG tablet Take 2 tablets by mouth daily 30 tablet 3    vancomycin (VANCOCIN) infusion Infuse 1,500 mg intravenously every 24 hours for 21 days Compound per protocol.  70196 mg 0    metroNIDAZOLE (FLAGYL) 500 MG tablet Take 1 tablet by mouth 3 times daily for 14 days 42 tablet 0    chlorthalidone (HYGROTON) 25 MG tablet Take 1 tablet by mouth daily 30 tablet 3    tamsulosin (FLOMAX) 0.4 MG capsule Take 1 capsule by mouth daily 30 capsule 3    amLODIPine-benazepril (LOTREL) 5-20 MG per capsule Take 1 capsule by mouth daily 30 capsule 3    aspirin 81 MG EC tablet Take 1 tablet by mouth daily 30 tablet 3    linagliptin (TRADJENTA) 5 MG 11.8 03/22/2019           Assessment/Recommendations:     - Syncope: holter, lexiscan, orthostatics  - DM  Stable  - perineal infection on ATB         Jas Sauceda MD, 6/6/2021 11:34 AM

## 2021-06-06 NOTE — ED NOTES
Patient came over for Nuclear Medicine V/Q scan. Patient tolerated well.   Patient back in room @ 0005 6/6/21  Thanks,  [employer] RT(R)(N)(CT) CNMT

## 2021-06-06 NOTE — CONSULTS
Pt seen ,examined,interviewed and chart reviewed. Please see the dictated consult for details     Imp :   1. CARMEN recurrent episodes  - on top of CKD staged  3a A3- sec to low BP_ ? infection etc with met acidosis  2. Low na could be from hyper( access TBW )  or hypovolemia   3. ? Syncope- from /? low BP ? Other etiology   4. recent groin - peroneal infection / necrosis s/p surgical tx  And with  abx -  5. underlying NS from DM/HTN/ etc     Plan:  1. Stop IVF  after 2  Liter   2. Manual BP  3. reassess the infection/ abx   4. Review ua , urinary  Indices   5. Goo d BS control  6.  Follow clinically       Thanks for the consult    #[id number]

## 2021-06-06 NOTE — PROGRESS NOTES
[facility]  consulted by Dr. Mckenzie Harper for monitoring and adjustment. Indication for treatment: Irene's gangrene  Goal trough: 10 - 15 mcg/mL    Pertinent Laboratory Values:   Temp Readings from Last 3 Encounters:   06/05/21 98 °F (36.7 °C) (Oral)   05/28/21 97.7 °F (36.5 °C) (Oral)   05/23/21 98.5 °F (36.9 °C) (Oral)     Recent Labs     06/05/21  1450   WBC 7.2     Recent Labs     06/05/21  1450   BUN 51*   CREATININE 2.5*     Estimated Creatinine Clearance: 48 mL/min (A) (based on SCr of 2.5 mg/dL (H)). No intake or output data in the 24 hours ending 06/06/21 0651    Pertinent Cultures:  Date    Source    Results  05/15   Perineal cx   Staph Epidermidis           Vancomycin level:   RANDOM:    Recent Labs     06/06/21  0425   VANCORANDOM 20.5       Assessment:  WBC WNL at 7.2; Afebrile  SCr = 2.5, BUN = 51, No I/O data: History of CKD; SCr up from previous levels  Day(s) of therapy: 1 inpatient (Continuation of outpatient therapy)  Vancomycin concentration:   06/06 - 20.5  06/07 - To be collected    Plan:  Patient had been prescribed Vancomycin 1,500 mg IV Q 24 Hours as outpatient  Vancomycin random level drawn prior to resuming therapy; Level returned at 20.5   Will adjust to intermittent dosing based on random levels 2/2 elevated SCr  No dose today; Will recheck random level with morning labs on 06/07/2021  Pharmacy will continue to monitor patient and adjust therapy as indicated    Marleni 3 06/07/2021 @ 6 AM    Thank you for the consult.   Alison Botello, [facility], [address]   6/6/2021 6:51 AM

## 2021-06-06 NOTE — CARE COORDINATION
Patient identified as potential readmission. Last admission 5/24-5/28 for tolu's gangrene. Patient here today for syncopal episode. CM spoke with Dr Elieser Pierce regarding patient status. [facility] patient's perineal infection appears to be improving. [facility] patient will require admission for further evaluation of syncope. Patient found to have d-dimer 558 and troponin 0.034. Patient currently awaiting nuclear med scan. 2139 CM met with patient to begin discharge planning. CM introduced self and CM role. Patient states he presents to ER with c/o syncopal episode while he was walking to his bedroom. Patient states he was sitting at home on the couch and he began coughing. States at that time he defecated on himself. States his wife was attempting to walk him to the bedroom where all of his wound supplies were located, when he had a syncopal episode. Patient reports syncopal episode did not \"last long\". Patient also reports another syncopal episode a couple days ago. Patient has a PCP and insurance which assists with medication affordability. Patient lives with his spouse in a home in [place]. Patient active with [facility]. [facility] has visits 3 times a week for dressing changes. Patient reports he also receives IV Vancomycin infusion at home. Patient has the following DME: cane, bedside commode. Patient does not drive, but depends on his spouse for transportation to and from [address] [place] [address]. Patient plan upon discharge is to return home with his spouse and [facility]. Patient is requiring readmission and there were no alternatives to admission to explore at this time. 2230 CM placed telephone call to [facility] to notify of admission. CM spoke with [facility] and provided notification.

## 2021-06-07 LAB
ANION GAP SERPL CALCULATED.3IONS-SCNC: 9 MMOL/L (ref 4–16)
BUN BLDV-MCNC: 44 MG/DL (ref 6–23)
CALCIUM SERPL-MCNC: 7.4 MG/DL (ref 8.3–10.6)
CHLORIDE BLD-SCNC: 110 MMOL/L (ref 99–110)
CO2: 18 MMOL/L (ref 21–32)
CREAT SERPL-MCNC: 2 MG/DL (ref 0.9–1.3)
DOSE AMOUNT: NORMAL
DOSE TIME: NORMAL
GFR AFRICAN AMERICAN: 44 ML/MIN/1.73M2
GFR NON-AFRICAN AMERICAN: 36 ML/MIN/1.73M2
GLUCOSE BLD-MCNC: 123 MG/DL (ref 70–99)
GLUCOSE BLD-MCNC: 137 MG/DL (ref 70–99)
GLUCOSE BLD-MCNC: 141 MG/DL (ref 70–99)
GLUCOSE BLD-MCNC: 165 MG/DL (ref 70–99)
GLUCOSE BLD-MCNC: 200 MG/DL (ref 70–99)
HCT VFR BLD CALC: 24.3 % (ref 42–52)
HEMOGLOBIN: 7.8 GM/DL (ref 13.5–18)
MAGNESIUM: 2.4 MG/DL (ref 1.8–2.4)
MCH RBC QN AUTO: 29.1 PG (ref 27–31)
MCHC RBC AUTO-ENTMCNC: 32.1 % (ref 32–36)
MCV RBC AUTO: 90.7 FL (ref 78–100)
PDW BLD-RTO: 13.9 % (ref 11.7–14.9)
PHOSPHORUS: 4.5 MG/DL (ref 2.5–4.9)
PLATELET # BLD: 278 K/CU MM (ref 140–440)
PMV BLD AUTO: 9.4 FL (ref 7.5–11.1)
POTASSIUM SERPL-SCNC: 4.5 MMOL/L (ref 3.5–5.1)
RBC # BLD: 2.68 M/CU MM (ref 4.6–6.2)
SODIUM BLD-SCNC: 137 MMOL/L (ref 135–145)
VANCOMYCIN RANDOM: 14 UG/ML
WBC # BLD: 5 K/CU MM (ref 4–10.5)

## 2021-06-07 PROCEDURE — 99221 1ST HOSP IP/OBS SF/LOW 40: CPT | Performed by: SURGERY

## 2021-06-07 PROCEDURE — 82962 GLUCOSE BLOOD TEST: CPT

## 2021-06-07 PROCEDURE — 84100 ASSAY OF PHOSPHORUS: CPT

## 2021-06-07 PROCEDURE — 2500000003 HC RX 250 WO HCPCS: Performed by: FAMILY MEDICINE

## 2021-06-07 PROCEDURE — APPSS45 APP SPLIT SHARED TIME 31-45 MINUTES: Performed by: NURSE PRACTITIONER

## 2021-06-07 PROCEDURE — 94761 N-INVAS EAR/PLS OXIMETRY MLT: CPT

## 2021-06-07 PROCEDURE — 80048 BASIC METABOLIC PNL TOTAL CA: CPT

## 2021-06-07 PROCEDURE — 99232 SBSQ HOSP IP/OBS MODERATE 35: CPT | Performed by: INTERNAL MEDICINE

## 2021-06-07 PROCEDURE — 85027 COMPLETE CBC AUTOMATED: CPT

## 2021-06-07 PROCEDURE — 2580000003 HC RX 258: Performed by: INTERNAL MEDICINE

## 2021-06-07 PROCEDURE — 83735 ASSAY OF MAGNESIUM: CPT

## 2021-06-07 PROCEDURE — 6370000000 HC RX 637 (ALT 250 FOR IP): Performed by: NURSE PRACTITIONER

## 2021-06-07 PROCEDURE — 99211 OFF/OP EST MAY X REQ PHY/QHP: CPT

## 2021-06-07 PROCEDURE — 80202 ASSAY OF VANCOMYCIN: CPT

## 2021-06-07 PROCEDURE — 6370000000 HC RX 637 (ALT 250 FOR IP): Performed by: FAMILY MEDICINE

## 2021-06-07 PROCEDURE — 1200000000 HC SEMI PRIVATE

## 2021-06-07 PROCEDURE — 6360000002 HC RX W HCPCS: Performed by: FAMILY MEDICINE

## 2021-06-07 PROCEDURE — 2580000003 HC RX 258: Performed by: FAMILY MEDICINE

## 2021-06-07 RX ORDER — AMLODIPINE BESYLATE 5 MG/1
5 TABLET ORAL DAILY
Status: DISCONTINUED | OUTPATIENT
Start: 2021-06-07 | End: 2021-06-08

## 2021-06-07 RX ADMIN — SERTRALINE HYDROCHLORIDE 100 MG: 100 TABLET ORAL at 09:22

## 2021-06-07 RX ADMIN — Medication 2 MG: at 04:10

## 2021-06-07 RX ADMIN — MICONAZOLE NITRATE: 2 POWDER TOPICAL at 23:11

## 2021-06-07 RX ADMIN — INSULIN GLARGINE 18 UNITS: 100 INJECTION, SOLUTION SUBCUTANEOUS at 22:04

## 2021-06-07 RX ADMIN — SODIUM CHLORIDE, PRESERVATIVE FREE 10 ML: 5 INJECTION INTRAVENOUS at 21:55

## 2021-06-07 RX ADMIN — AMLODIPINE BESYLATE 5 MG: 5 TABLET ORAL at 12:38

## 2021-06-07 RX ADMIN — VANCOMYCIN HYDROCHLORIDE 1250 MG: 5 INJECTION, POWDER, LYOPHILIZED, FOR SOLUTION INTRAVENOUS at 12:38

## 2021-06-07 RX ADMIN — OXYCODONE HYDROCHLORIDE AND ACETAMINOPHEN 1 TABLET: 5; 325 TABLET ORAL at 09:22

## 2021-06-07 RX ADMIN — TAMSULOSIN HYDROCHLORIDE 0.4 MG: 0.4 CAPSULE ORAL at 09:21

## 2021-06-07 RX ADMIN — ZOLPIDEM TARTRATE 5 MG: 5 TABLET ORAL at 23:19

## 2021-06-07 RX ADMIN — METRONIDAZOLE 500 MG: 250 TABLET ORAL at 21:51

## 2021-06-07 RX ADMIN — INSULIN LISPRO 1 UNITS: 100 INJECTION, SOLUTION INTRAVENOUS; SUBCUTANEOUS at 18:33

## 2021-06-07 RX ADMIN — HEPARIN SODIUM 5000 UNITS: 5000 INJECTION INTRAVENOUS; SUBCUTANEOUS at 05:10

## 2021-06-07 RX ADMIN — METRONIDAZOLE 500 MG: 250 TABLET ORAL at 05:10

## 2021-06-07 RX ADMIN — INSULIN LISPRO 1 UNITS: 100 INJECTION, SOLUTION INTRAVENOUS; SUBCUTANEOUS at 09:23

## 2021-06-07 RX ADMIN — HEPARIN SODIUM 5000 UNITS: 5000 INJECTION INTRAVENOUS; SUBCUTANEOUS at 15:00

## 2021-06-07 RX ADMIN — ASPIRIN 81 MG: 81 TABLET, COATED ORAL at 09:21

## 2021-06-07 RX ADMIN — SODIUM CHLORIDE, PRESERVATIVE FREE 10 ML: 5 INJECTION INTRAVENOUS at 09:23

## 2021-06-07 RX ADMIN — Medication 2 MG: at 21:54

## 2021-06-07 RX ADMIN — ONDANSETRON 4 MG: 2 INJECTION INTRAMUSCULAR; INTRAVENOUS at 09:21

## 2021-06-07 RX ADMIN — OXYCODONE HYDROCHLORIDE AND ACETAMINOPHEN 1 TABLET: 5; 325 TABLET ORAL at 17:04

## 2021-06-07 RX ADMIN — SODIUM CHLORIDE: 9 INJECTION, SOLUTION INTRAVENOUS at 21:52

## 2021-06-07 RX ADMIN — METRONIDAZOLE 500 MG: 250 TABLET ORAL at 15:00

## 2021-06-07 RX ADMIN — GUAIFENESIN 1200 MG: 600 TABLET, EXTENDED RELEASE ORAL at 09:22

## 2021-06-07 RX ADMIN — HEPARIN SODIUM 5000 UNITS: 5000 INJECTION INTRAVENOUS; SUBCUTANEOUS at 22:05

## 2021-06-07 RX ADMIN — ONDANSETRON 4 MG: 2 INJECTION INTRAMUSCULAR; INTRAVENOUS at 03:40

## 2021-06-07 RX ADMIN — GUAIFENESIN 1200 MG: 600 TABLET, EXTENDED RELEASE ORAL at 21:51

## 2021-06-07 RX ADMIN — OXYCODONE HYDROCHLORIDE AND ACETAMINOPHEN 1 TABLET: 5; 325 TABLET ORAL at 23:18

## 2021-06-07 ASSESSMENT — PAIN SCALES - GENERAL
PAINLEVEL_OUTOF10: 8
PAINLEVEL_OUTOF10: 7
PAINLEVEL_OUTOF10: 9
PAINLEVEL_OUTOF10: 7

## 2021-06-07 ASSESSMENT — PAIN DESCRIPTION - PROGRESSION
CLINICAL_PROGRESSION: NOT CHANGED
CLINICAL_PROGRESSION: GRADUALLY WORSENING

## 2021-06-07 ASSESSMENT — PAIN DESCRIPTION - DESCRIPTORS
DESCRIPTORS: CONSTANT;THROBBING
DESCRIPTORS: THROBBING
DESCRIPTORS: THROBBING
DESCRIPTORS: CONSTANT;THROBBING;DISCOMFORT

## 2021-06-07 ASSESSMENT — PAIN DESCRIPTION - LOCATION
LOCATION: SCROTUM

## 2021-06-07 ASSESSMENT — ENCOUNTER SYMPTOMS
COUGH: 1
ALLERGIC/IMMUNOLOGIC NEGATIVE: 1
EYES NEGATIVE: 1
GASTROINTESTINAL NEGATIVE: 1

## 2021-06-07 ASSESSMENT — PAIN - FUNCTIONAL ASSESSMENT
PAIN_FUNCTIONAL_ASSESSMENT: PREVENTS OR INTERFERES SOME ACTIVE ACTIVITIES AND ADLS
PAIN_FUNCTIONAL_ASSESSMENT: PREVENTS OR INTERFERES WITH MANY ACTIVE NOT PASSIVE ACTIVITIES

## 2021-06-07 ASSESSMENT — PAIN DESCRIPTION - FREQUENCY
FREQUENCY: CONTINUOUS

## 2021-06-07 ASSESSMENT — PAIN DESCRIPTION - PAIN TYPE
TYPE: CHRONIC PAIN

## 2021-06-07 ASSESSMENT — PAIN DESCRIPTION - ONSET
ONSET: ON-GOING

## 2021-06-07 ASSESSMENT — PAIN DESCRIPTION - ORIENTATION
ORIENTATION: RIGHT;LEFT;POSTERIOR
ORIENTATION: RIGHT;LEFT;POSTERIOR;ANTERIOR;MID

## 2021-06-07 NOTE — PLAN OF CARE
Problem: Falls - Risk of:  Goal: Will remain free from falls  Description: Will remain free from falls  Outcome: Ongoing  Goal: Absence of physical injury  Description: Absence of physical injury  Outcome: Ongoing     Problem: Pain:  Goal: Pain level will decrease  Description: Pain level will decrease  Outcome: Ongoing  Goal: Control of acute pain  Description: Control of acute pain  Outcome: Ongoing  Goal: Control of chronic pain  Description: Control of chronic pain  Outcome: Ongoing       Electronically signed by Zena Jacobsen RN on 6/6/21 at 11:30 PM EDT

## 2021-06-07 NOTE — PROGRESS NOTES
[facility] [facility]  consulted by Dr. Imer aLma for monitoring and adjustment. Indication for treatment: Irene's gangrene  Goal trough: 10 - 15 mcg/mL    Pertinent Laboratory Values:   Temp Readings from Last 3 Encounters:   06/07/21 98.2 °F (36.8 °C) (Oral)   05/28/21 97.7 °F (36.5 °C) (Oral)   05/23/21 98.5 °F (36.9 °C) (Oral)     Recent Labs     06/05/21  1450 06/06/21  1240 06/07/21  0524   WBC 7.2 4.7 5.0     Recent Labs     06/05/21  1450 06/06/21  1240 06/07/21  0524   BUN 51* 49* 44*   CREATININE 2.5* 2.2* 2.0*     Estimated Creatinine Clearance: 60 mL/min (A) (based on SCr of 2 mg/dL (H)).     Intake/Output Summary (Last 24 hours) at 6/7/2021 0857  Last data filed at 6/7/2021 0409  Gross per 24 hour   Intake 20 ml   Output 1600 ml   Net -1580 ml       Pertinent Cultures:  Date    Source    Results  05/15   Perineal cx   Staph Epidermidis           Vancomycin level:   RANDOM:    Recent Labs     06/06/21  0425 06/07/21  0524   VANCORANDOM 20.5 14.0       Assessment:  · No leukocytosis; afebrile  · CARMEN with History of CKD  · Scr trend improving  · Day(s) of therapy: 2 inpatient (Continuation of outpatient therapy)  · Vancomycin concentration:   · 06/06 - 20.5  · 06/07 - 14, appropriate to re-dose     Plan:  · Patient prescribed Vancomycin 1,500 mg IV Q 24 Hours as outpatient  · Vancomycin random level drawn prior to resuming therapy @ 20.5 (therapeutic)  · Continue intermittent dosing based on levels while in CARMEN  · Based on level result, will re-dose with vancomycin 1250 mg x1  · Repeat next random level tomorrow AM  · Pharmacy will continue to monitor patient and adjust therapy as indicated    VANCOMYCIN CONCENTRATION SCHEDULED FOR 06/08/2021 @ 0600    Thank you for the consult,  Ingris Hovoer, [address] [place] [address]  6/7/2021 8:57 AM

## 2021-06-07 NOTE — PROGRESS NOTES
Attending Progress Note      PCP: Claudio Keith MD      Patient: Stephenie Lira   Gender: male  : 1975   Age: 55 y.o. MRN: [id number]  Room  : [address] [place] [address]      Date of Admission: 2021    Chief Complaint   Patient presents with    Wound Check     Open wound to scrotum. Had a coughing fit at home and was incontinent and got stool in his wound           Subjective:  Pain at right groin .  No CP           Medications:  Reviewed  Infusion Medications    dextrose      sodium chloride 100 mL/hr at 21 0128    sodium chloride       Scheduled Medications    amLODIPine  5 mg Oral Daily    collagenase   Topical Daily    miconazole   Topical BID    aspirin  81 mg Oral Daily    tamsulosin  0.4 mg Oral Daily    sertraline  100 mg Oral Daily    metroNIDAZOLE  500 mg Oral TID    sodium chloride flush  5-40 mL Intravenous 2 times per day    heparin (porcine)  5,000 Units Subcutaneous 3 times per day    insulin glargine  0.15 Units/kg Subcutaneous Nightly    insulin lispro  0.05 Units/kg Subcutaneous TID WC    insulin lispro  0-6 Units Subcutaneous TID WC    insulin lispro  0-3 Units Subcutaneous Nightly    guaiFENesin  1,200 mg Oral BID    vancomycin (VANCOCIN) intermittent dosing (placeholder)   Other See Admin Instructions     PRN Meds: ondansetron, oxyCODONE-acetaminophen, glucose, dextrose, glucagon (rDNA), dextrose, sodium chloride flush, sodium chloride, potassium chloride **OR** potassium alternative oral replacement **OR** potassium chloride, magnesium sulfate, promethazine **OR** ondansetron, polyethylene glycol, fleet, acetaminophen **OR** acetaminophen, zolpidem, guaiFENesin-dextromethorphan, benzonatate, calcium carbonate, morphine      Intake/Output Summary (Last 24 hours) at 2021 1938  Last data filed at 2021 0409  Gross per 24 hour   Intake 10 ml   Output 1200 ml   Net -1190 ml       Exam:  BP (!) 152/89   Pulse 84   Temp 98.3 °F (36.8 °C) (Oral)   Resp 16   Ht 5' 9\" (1.753 m)   Wt 270 lb (122.5 kg)   SpO2 98%   BMI 39.87 kg/m²   General appearance: No distress,   Respiratory:  good air entry , no Rales , No wheezing, or rhonchi,  Cardiovascular: RRR, with normal S1/S2 . Abdomen : Soft, non-tender, non-distended  , normal bowel sounds. Legs : No edema bilaterally. No DVT signs ,    Neurologic:  Alert and oriented ,        Labs:   Recent Labs     06/05/21  1450 06/06/21  1240 06/07/21  0524   WBC 7.2 4.7 5.0   HGB 9.3* 8.9* 7.8*   HCT 28.1* 26.7* 24.3*    315 278     Recent Labs     06/05/21  1450 06/06/21  1240 06/07/21  0524    134* 137   K 4.4 5.1 4.5    107 110   CO2 20* 17* 18*   BUN 51* 49* 44*   CREATININE 2.5* 2.2* 2.0*   CALCIUM 8.4 7.9* 7.4*   PHOS  --   --  4.5     No results for input(s): AST, ALT, BILIDIR, BILITOT, ALKPHOS in the last 72 hours. No results for input(s): INR in the last 72 hours. No results for input(s): Cherene Tariq in the last 72 hours. Assessment/Plan:  Active Hospital Problems    Diagnosis Date Noted    Syncope and collapse [R55] 06/05/2021   DM II uncontrolled   ARF on CKD   Depression   Recent h/o Irene abscess s/p debridement on IV Vancomycin   Elevated troponin       Assessment/Plan:   Tele : no event - no fever - on room air   Cont IVF NACL  renal function. . improving  , holding  diuretics , nephro input noted   Cont IV ABX Vancomycin and PO flagyl . .. surg consult requested . .. wound care appreciated   cardiac markers : elevated , cardiologist input for work up holter and stress test    Glucose control, insulin coverage   Pain control   Home meds , reviewed and resumed as appropriate   Symptoms releif/Pain control  DVT Prophylaxis   Diet: ADULT DIET; Regular; 5 carb choices (75 gm/meal)  Adult Oral Nutrition Supplement;  Wound Healing Oral Supplement  Adult Oral Nutrition Supplement; Diabetic Oral Supplement  Code Status: Full Code        [unfilled]    Treatment progress and plan was d/w pt/family .     Caridad Owens MD

## 2021-06-07 NOTE — PROGRESS NOTES
Nephrology Progress Note  6/7/2021 7:24 AM        Subjective:   Admit Date: 6/5/2021  PCP: Francy Nj MD    Interval History: feels little bit better    Diet: some    ROS:  Groin - wound pain   No sob      Data:     Current meds:    aspirin  81 mg Oral Daily    tamsulosin  0.4 mg Oral Daily    sertraline  100 mg Oral Daily    metroNIDAZOLE  500 mg Oral TID    sodium chloride flush  5-40 mL Intravenous 2 times per day    heparin (porcine)  5,000 Units Subcutaneous 3 times per day    insulin glargine  0.15 Units/kg Subcutaneous Nightly    insulin lispro  0.05 Units/kg Subcutaneous TID WC    insulin lispro  0-6 Units Subcutaneous TID WC    insulin lispro  0-3 Units Subcutaneous Nightly    guaiFENesin  1,200 mg Oral BID    vancomycin (VANCOCIN) intermittent dosing (placeholder)   Other See Admin Instructions      dextrose      sodium chloride 100 mL/hr at 06/06/21 0128    sodium chloride           I/O last 3 completed shifts: In: 20 [I.V.:20]  Out: 1600 [Urine:1600]    CBC:   Recent Labs     06/05/21  1450 06/06/21  1240 06/07/21  0524   WBC 7.2 4.7 5.0   HGB 9.3* 8.9* 7.8*    315 278          Recent Labs     06/05/21  1450 06/06/21  1240 06/06/21  1402 06/07/21  0524    134*  --  137   K 4.4 5.1  --  4.5    107  --  110   CO2 20* 17*  --  18*   BUN 51* 49*  --  44*   CREATININE 2.5* 2.2*  --  2.0*   GLUCOSE 75 120* 118 123*       Lab Results   Component Value Date    CALCIUM 7.4 (L) 06/07/2021    PHOS 4.5 06/07/2021       Objective:     Vitals: BP (!) 160/90   Pulse 77   Temp 98.2 °F (36.8 °C) (Oral)   Resp 18   Ht 5' 9\" (1.753 m)   Wt 270 lb (122.5 kg)   SpO2 100%   BMI 39.87 kg/m²     General appearance:  As above   HEENT:  + conj pallor  Neck:  Supple   Lungs:  No crackles  Heart:  RRR  Abdomen: soft  Extremities:  Trace LE edema   Groin wound       Problem List :         Impression :     1. CARMEN- CKD stage 3 a a3- recovering  2.  DM/.NS-  Groin  wound - infection

## 2021-06-07 NOTE — PROGRESS NOTES
Cardiology Progress Note     Today's Plan plan for jenaro in am  Add amlodipine     Admit Date:  6/5/2021    Consult reason/ Seen today for: sycope    Subjective and  Overnight Events:  Denies any further syncope- noted dizziness with standing. C/o pain to the right groin site. Telemetry:  SR no arrhythmia or bradycardia noted     Assessment / Plan / Recommendation:     1. Syncope- + orthostatic blood pressures-- at home on flomax/hygroton/ lotrel - hold antihypertensives/ diuretic- recommend to keep hydrated - will check holter to assess for arrhythmia as well   2. Elevated troponin- ? Type 2 rise in the setting of anemia/CKD recommend to check jenaro- not able to do today secondary to low hemoglobin - currently denies chest pain - high risk for ASCVD in a patient with history of HTN-HLD CKD- DM  3. H/o fornier gangrene- on ATB- continues with groin pain  4. HTN- presented with low bp and syncope-give IV fluid- antihypertensives placed on hold- bp improved - will resume amlodipine-       History of Presenting Illness:    Chief complain on admission : 55 y. o.year old who is admitted for  Chief Complaint   Patient presents with    Wound Check     Open wound to scrotum.  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, Hyperlipidemia LDL goal < 100, Hypertension, Internal hemorrhoid, Necrotizing fasciitis ([address] Nose fracture, Panic attacks, Pericarditis, Peripheral autonomic neuropathy due to diabetes mellitus ([facility] Sebaceous cyst, URI (upper respiratory infection), WD-Wound, surgical, infected, initial encounter, and Wrist fracture. Past surgical history:   has a past surgical history that includes Cardiac catheterization (2003, 2013); Tonsillectomy and adenoidectomy (1988); Upper gastrointestinal endoscopy (06/2/2011); Colonoscopy (6/2/2011); Nose surgery (1993); skin biopsy (N/A, [id number]); other surgical history (Right, 05/22/2015); Toe amputation; Abdomen surgery; other surgical history (12/04/2017); pr deep incis foot bone infectn (Left, 9/22/2018); Upper gastrointestinal endoscopy (N/A, 1/12/2019); Toe amputation (Right, 3/23/2019); Toe amputation (Right, 8/6/2019); Scrotal surgery (N/A, 5/15/2021); and Scrotal surgery (N/A, 5/16/2021). Social History:   reports that he quit smoking about 3 years ago. He quit after 20.00 years of use. He has never used smokeless tobacco. He reports current alcohol use. He reports current drug use. Frequency: 7.00 times per week. Drug: Marijuana. Family history:  family history includes ADHD in his daughter; Bleeding Prob in his mother; Cancer in his mother; Diabetes in his father and sister; Heart Disease in his father; High Blood Pressure in his father, sister, and another family member; Kidney Disease in his father; Mental Illness in his sister and another family member; Obesity in his father and sister; Other in his son; Stroke in his mother.     Allergies   Allergen Reactions    Adhesive Tape Rash    Doxycycline Nausea And Vomiting    Reglan [Metoclopramide] Anxiety       Review of Systems:   All 14 systems were reviewed and are negative  Except for the positive findings which are documented     BP (!) 158/94   Pulse 80   Temp 98.2 °F (36.8 °C) (Oral)   Resp 16   Ht 5' 9\" (1.753 m)   Wt 270 lb (122.5 kg)   SpO2 100%   BMI 39.87 kg/m²       Intake/Output Summary (Last 24 hours) at 6/7/2021 1112  Last data filed at 6/7/2021 0409  Gross per 24 hour   Intake 10 ml   Output 1600 ml   Net -1590 ml       Physical Exam:  Constitutional:  Well developed,  No acute distress  HENT:  Normocephalic, Atraumatic, Bilateral external ears normal,  Nose normal.   Neck- trachea is midline  Eyes:  PEERL, Conjunctiva normal  Respiratory:  Normal breath sounds, No respiratory distress, No wheezing, No chest tenderness. Cardiovascular:  Normal heart rate, Normal rhythm, no murmurs appreciated, No rubs appreciated, No gallops appreciated, JVP not elevated  Abdomen/GI:  Large round non tender  Musculoskeletal:  Intact distal pulses, no edema to lower legs,  No tenderness, No cyanosis, No clubbing. Integument:  Warm, Dry-dressing to the right groin site- tender to touch   Lymphatic:  No lymphadenopathy noted.    Neurologic:  Alert & oriented  Psychiatric:  Affect and Mood :pleasant     Medications:    vancomycin  1,250 mg Intravenous Once    aspirin  81 mg Oral Daily    tamsulosin  0.4 mg Oral Daily    sertraline  100 mg Oral Daily    metroNIDAZOLE  500 mg Oral TID    sodium chloride flush  5-40 mL Intravenous 2 times per day    heparin (porcine)  5,000 Units Subcutaneous 3 times per day    insulin glargine  0.15 Units/kg Subcutaneous Nightly    insulin lispro  0.05 Units/kg Subcutaneous TID WC    insulin lispro  0-6 Units Subcutaneous TID WC    insulin lispro  0-3 Units Subcutaneous Nightly    guaiFENesin  1,200 mg Oral BID    vancomycin (VANCOCIN) intermittent dosing (placeholder)   Other See Admin Instructions      dextrose      sodium chloride 100 mL/hr at 06/06/21 0128    sodium chloride       ondansetron, oxyCODONE-acetaminophen, glucose, dextrose, glucagon (rDNA), dextrose, sodium chloride flush, sodium chloride, potassium chloride **OR** potassium alternative oral replacement **OR** potassium chloride, magnesium sulfate, promethazine **OR** ondansetron, polyethylene glycol, fleet, acetaminophen **OR** acetaminophen, zolpidem, guaiFENesin-dextromethorphan, benzonatate, calcium carbonate, morphine    Lab Data:  CBC:   Recent Labs     06/05/21  1450 06/06/21  1240 06/07/21  0524   WBC 7.2 4.7 5.0   HGB 9.3* 8.9* 7.8*   HCT 28.1* 26.7* 24.3*   MCV 88.9 89.6 90.7    315 278     BMP:   Recent Labs     06/05/21  1450 06/06/21  1240 06/07/21  0524    134* 137   K 4.4 5.1 4.5    107 110   CO2 20* 17* 18*   PHOS  --   --  4.5   BUN 51* 49* 44*   CREATININE 2.5* 2.2* 2.0*     PT/INR: No results for input(s): PROTIME, INR in the last 72 hours. BNP:  No results for input(s): PROBNP in the last 72 hours. TROPONIN:   Recent Labs     06/05/21  1450 06/05/21  2156   TROPONINT 0.034* 0.022*               Impression:  Active Problems:    Syncope and collapse  Resolved Problems:    * No resolved hospital problems. *       All labs, medications and tests reviewed by myself, continue all other medications of all above medical condition listed as is except for changes mentioned above. Thank you   Please call with questions. Electronically signed by APPLE Larson CNP on 6/7/2021 at 11:12 AM  I have seen ,spoken to  and examined this patient personally, independently of the nurse practitioner. I have reviewed the hospital care given to date and reviewed all pertinent labs and imaging. The plan was developed mutually at the time of the visit with the patient,  NP  and myself. I have spoken with patient, nursing staff and provided written and verbal instructions . The above note has been reviewed and I agree with the assessment, diagnosis, and treatment plan with changes made by me as follows     CARDIOLOGY ATTENDING ADDENDUM    HPI:  I have reviewed the above HPI  And agree with above   Yoanna Major is a 55 y. o.year old who and presents with had concerns including Wound Check (Open wound to scrotum. Had a coughing fit at home and was incontinent and got stool in his wound).   Chief Complaint   Patient presents with    Wound Check Open wound to scrotum. Had a coughing fit at home and was incontinent and got stool in his wound     Interval history:  NO CP    Physical Exam:  General:  Awake, alert, NAD  Head:normal  Eye:normal  Neck:  No JVD   Chest:  Clear to auscultation, respiration easy  Cardiovascular:  RRR S1S2  Abdomen:   nontender  Extremities:  +1 edema  Pulses; palpable  Neuro: grossly normal      MEDICAL DECISION MAKING;    I agree with the above plan, which was planned by myself and discussed with NP.   PT ANEMIC  CARDIOLITE ONCE BETTER        Matt Samuel MD [facility] [place]

## 2021-06-07 NOTE — PLAN OF CARE
Nutrition Problem #1: Inadequate protein-energy intake  Intervention: Food and/or Nutrient Delivery: Modify Current Diet, Start Oral Nutrition Supplement  Nutritional Goals: Pt will consume at least 50% of meals and supplements

## 2021-06-07 NOTE — CONSULTS
[address], [address] [facility]                                  CONSULTATION    PATIENT NAME: Neris Krishna                     :        1975  MED REC NO:   [id number]                          ROOM:       5143  ACCOUNT NO:   [de-identified]                           ADMIT DATE: 2021  PROVIDER:     James Gallo MD    CONSULT DATE:  2021    CONSULT REQUESTED BY:  Abiola Truong MD    REASON FOR CONSULTATION  Acute kidney injury. BRIEF HISTORY:  The patient is a 68-year-old unfortunate male with  diabetic kidney disease who was brought to the emergency room apparently  with two syncopal episode. The patient is here twice not too long ago,  apparently he was at home and had some bowel incontinence after coughing  and the wife was trying to clean him and he was trying to walk and he  passed out for a few seconds. He had a little bit of confusion  afterwards and felt lightheaded and dizziness prior to passing out. Apparently, he had another episode before too. Combination of both  syncope made him to come to the emergency room. He did come through the  [facility]. Looks like his blood pressure was low when he came in here in the  emergency room. He was given some IV fluid for restoration of blood  pressure. He also underwent several diagnostic tests, which include  imaging and biochemical.  Imaging study mainly lung scan, which was  negative for PE or low probability. I should say chest x-ray was  otherwise unremarkable and biochemical testing showed significant  proteinuria and the urine which we know that he has a nephrotic range  proteinuria and nephrotic syndrome and sodium of 134 and BUN 49,  creatinine 2.2, sugar as 120 and bicarb was 17. CBC showed anemia. He  was given IV fluid and he will be admitted for further evaluation. I am familiar with him.   I have been seeing him since March, but never had a chance to see in my office, as mainly he is in and out of the  hospital.  Briefly speaking, his creatinine was about 1.0-1.2 range up  until 2019, but it slowly increased since then. But he has had several  acute kidney injury by creatinine criteria and also was admitted in May,  had significant sepsis from groin and perineal infection and necrosis. He underwent surgical debridement as well as multiple antimicrobial  agent as he has polymicrobial infection. I saw him at that time when  the creatinine peaked more than 3, but he was recovering. He was  readmitted again with wound issue and wound VAC not working and had  again increased creatinine. Looks like he was recovering little bit now  with 2.2 current creatinine. In general, he has nephrotic syndrome with low albumin edema and of  course, nephrotic range proteinuria from diabetes as all other serology  was negative. MEDICATIONS:  1. Progressive CKD with several acute kidney injury as mentioned  earlier, so he has very high risk of renal disease progression. 2.  Diabetes mellitus diagnosed 15+ years ago. Does not look like very  well controlled. Initially, he was on oral pill, now on insulin. 3.  Hypertension, longstanding. Obviously, his blood pressure is low  now. 4.  One episode of pericarditis. 5.  He had a heart cath 10 years ago, apparently no clinically  significant obstructive lesion. 6.  Probable sleep apnea. He is not on CPAP machine though. 7.  Diabetic neuropathy. 8.  Recent groin perineal infection. HABITS:  Smokes half pack per day, although he said he has been trying  to cut it down. He has been doing it for last 20 years. Drinks  socially alcohol. No history of illicit drug abuse. FAMILY MEDICAL HISTORY:  Pretty significant. His father had diabetic  kidney disease and end stage renal disease,  eventually from  complication of it. He was in dialysis. He was in his [de-identified] though. Mom  also had malignancy. SOCIAL HISTORY:  The patient is  since 2000. He does have four  biological children. He is a , but not working for the last 4 years  or so. He was born and raised in [facility]. REVIEW OF SYSTEMS:  He said mainly the syncopal episode that was his  main problem. No fever, chills, or rigors. His blood pressure  obviously was low. He has lightheadedness. No urinary symptoms. Rest  of the review of systems is negative other than in previous paragraph. PHYSICAL EXAMINATION:  VITAL SIGNS:  At the time of examination, temperature T-max is 98, blood  pressure did come up though is low, now is 130S/80S. When I saw him in  the emergency room, he is sating 99% on ambient air, respiratory 18,  pulse about 78. GENERAL:  The patient without any acute distress. He is alert, awake,  and oriented. He has a little bit of pain in the groin area. HEAD AND NECK:  Normocephalic, traumatic. EYES:  I do not see any striking conjunctival pallor even though his  hemoglobin is low. CARDIOVASCULAR:  Regular rate and rhythm. RESPIRATORY:  Did not see any crackles. ABDOMEN:  Soft. EXTREMITIES:  Little bit of edema, much better than before. He still  has some groin area infection. I really did not examine it at that  time. It was really painful. LABORATORY VALUES AND ANCILLARY SERVICES:  As mentioned earlier, he is  anemic with hemoglobin 8.9. IMPRESSION:  A 79-year-old male with acute kidney injury. 1.  Acute kidney injury, recurrent episode on top of CKD stage IIIA, A3  secondary to low BP infection, etc.  2.  Metabolic acidosis due to probably acute kidney injury. 3.  Probable syncope from probably low blood pressure, but other  etiologies also possible. He does have high risk of major adverse  cerebrovascular and cardiovascular event. 4.  Recent groin and perineal infection, necrosis etc.  Status post  surgical treatment as well as an antibiotic. He might be still taking  it orally.   5. Underlying nephrotic syndrome for diabetes, hypertension, anemia, to  name a few. PLAN:  1. Stop IV fluid after 2 liters. He does have risk of fluid overload. 2.  Manual blood pressure. Reassess the infection, antibiotic, etc.   Review urinary indices. Good blood sugar control and probable also do  some workup for syncopal episode and follow clinically.         Jackie Alberto MD    D: 06/06/2021 14:38:02       T: 06/06/2021 14:41:02     [email or web address]  Job#: [id number]     Doc#: [id number]    CC:

## 2021-06-07 NOTE — CONSULTS
Department of General Surgery   Surgical Service Dr. Atul Cabrera   Consult Note    Date of Consult: 6/7/21    Reason for Consult:  Groin wound, pt known from previous admissions    Requesting Physician:  Wound Nurse    CHIEF COMPLAINT:  Admitted with syncope     History Obtained From:  patient, electronic medical record    HISTORY OF PRESENT ILLNESS:      The patient is a 55 y.o. male who presented to the ED with complaints described as:      Location: N/A  Quality: syncope  Severity: N/A  Duration: Happened twice the day the pt presented to ED  Timing: Acute  Context: previous admissions for tolu gangrene  Modifying factors: denies  Associated signs and symptoms: coughing, happened after standing up    Pt was evaluated in ED and admitted to Dr. Luz Bethea. Consults were placed to Cardiology for syncope, Nephrology for CARMEN, and also to the Wound Team.    After the Wound Team evaluated the pt, a c/s was placed to General Surgery for evaluation of the wound. Pt's recent surgical history is as follows:    5/15/21 - to OR for exploration and debridement with  for tolu gangrene. 5/16/21 - to OR for planned second look. 5/24/21 - pt returned to ED bc of issues with his wound vac and was admitted. Past Medical History:    Past Medical History:   Diagnosis Date    Abscess 2010    scrotal    Acute renal failure (ARF) ([address] 8/4/2019    Anxiety associated with depression     Anxiety associated with depression     Back pain 7/2/2012    Chest pain 5/1/2013    Diabetic nephropathy ([address]     Diabetic neuropathy ([address] 12/22/2011    Diabetic ulcer of left midfoot associated with type 2 diabetes mellitus, with fat layer exposed ([address] 7/18/2017    Diverticulosis     C scope + Dr. mAbika Rossi DM (diabetes mellitus), type 2 ([address] 2002    DR. 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 Ambika Rossi Necrotizing fasciitis ([facility]     Nose fracture 1988    Panic attacks     Pericarditis 2003    Hospitalized with s/p heart cath normal    Peripheral autonomic neuropathy due to diabetes mellitus ([facility]     Axonal EMG- NCS, March 2011    Sebaceous cyst 9/1/2011    URI (upper respiratory infection) 2/27/2012    WD-Wound, surgical, infected, initial encounter 12/8/2017    Wrist fracture 1986       Past Surgical History:    Past Surgical History:   Procedure Laterality Date    ABDOMEN SURGERY      CARDIAC CATHETERIZATION  2003, 2013    Normal (Dr. Anupama Schmitz)    COLONOSCOPY  6/2/2011    Pandiverticulosis, Nonbleeding internal hemorrhoids, Repeat colonoscopy at age 48- Dr. Geraldine Hammond    \"had reconstruction surgery on nose a year after the other nose surgery\"    OTHER SURGICAL HISTORY Right 05/22/2015    I & D right great toe with partial amputation    OTHER SURGICAL HISTORY  12/04/2017    inc and drainage of abcess    [place] DEEP INCIS FOOT BONE INFECTN Left 9/22/2018    LEFT FOOT DEBRIDEMENT INCISION AND DRAINAGE TOP AND BOTTOM performed by Richard Hill DPM at [facility] N/A 5/15/2021    SCROTAL AND PERINEAL DEBRIDEMENT performed by Tito Ogden MD at [address] [place] [address] 5/16/2021    SCROTAL RE-DEBRIDEMENT  INCISION AND DRAINAGE performed by Tito Ogden MD at [address] [place] [address]    sebaceous cyst removal times 4     TOE AMPUTATION      right great toe    TOE AMPUTATION Right 3/23/2019    TOE AMPUTATION RIGHT 5TH TOE performed by Richard Hill DPM at [facility] Right 8/6/2019    TOE AMPUTATION RIGHT 4TH TOE performed by Richard Hill DPM at [place] [address]. ENDOSCOPY  06/2/2011    Mild gastritis with moderatley severe antritis, small hiatal hernia, Dr. Sathish Dickson N/A 1/12/2019    EGD BIOPSY performed by Eric Bennett MD at [facility] bicarb-citric acid (EFFER-K) effervescent tablet 40 mEq  40 mEq Oral PRN José Luis Izquierdo MD        Or    potassium chloride 10 mEq/100 mL IVPB (Peripheral Line)  10 mEq Intravenous PRN José Luis Izquierdo MD        magnesium sulfate 2000 mg in 50 mL IVPB premix  2,000 mg Intravenous PRN José Luis Izquierdo MD        promethazine (PHENERGAN) tablet 12.5 mg  12.5 mg Oral Q6H PRN José Luis Izquierdo MD        Or    ondansetron (ZOFRAN) injection 4 mg  4 mg Intravenous Q6H PRN José Luis Izquierdo MD   4 mg at 06/07/21 0921    polyethylene glycol (GLYCOLAX) packet 17 g  17 g Oral Daily PRN Cole Gomez MD        fleet rectal enema 1 enema  1 enema Rectal Daily PRN José Luis Izquierdo MD        acetaminophen (TYLENOL) tablet 650 mg  650 mg Oral Q6H PRN José Luis Izquierdo MD        Or    acetaminophen (TYLENOL) suppository 650 mg  650 mg Rectal Q6H PRN José Luis Izquierdo MD        heparin (porcine) injection 5,000 Units  5,000 Units Subcutaneous 3 times per day Cole Gomez MD   5,000 Units at 06/07/21 1500    insulin glargine (LANTUS) injection vial 18 Units  0.15 Units/kg Subcutaneous Nightly José Luis Izquierdo MD   18 Units at 06/06/21 2227    insulin lispro (HUMALOG) injection vial 6 Units  0.05 Units/kg Subcutaneous TID  José Luis Izquierdo MD   6 Units at 06/07/21 1237    insulin lispro (HUMALOG) injection vial 0-6 Units  0-6 Units Subcutaneous TID OMAR Gomez MD   1 Units at 06/07/21 0923    insulin lispro (HUMALOG) injection vial 0-3 Units  0-3 Units Subcutaneous Nightly Cole Gomez MD   1 Units at 06/06/21 2226    zolpidem (AMBIEN) tablet 5 mg  5 mg Oral Nightly PRN Cole Gomez MD        guaiFENesin-dextromethorphan (ROBITUSSIN DM) 100-10 MG/5ML syrup 10 mL  10 mL Oral Q6H PRN José Luis Izquierdo MD        benzonatate (TESSALON) capsule 200 mg  200 mg Oral TID PRN José Luis Izquierdo MD        guaiFENesin (MUCINEX) extended release tablet 1,200 mg  1,200 mg Oral BID José Luis Comments: Groin wound with some sloughing, beefy red wound bed, no drainage, no odor  Musculoskeletal:         General: No swelling. Cervical back: Normal range of motion. Skin:     General: Skin is warm. Neurological:      General: No focal deficit present. Mental Status: He is alert.    Psychiatric:         Mood and Affect: Mood normal.           DATA:    CBC:   Lab Results   Component Value Date    WBC 5.0 06/07/2021    RBC 2.68 06/07/2021    HGB 7.8 06/07/2021    HCT 24.3 06/07/2021    MCV 90.7 06/07/2021    MCH 29.1 06/07/2021    MCHC 32.1 06/07/2021    RDW 13.9 06/07/2021     06/07/2021    MPV 9.4 06/07/2021     CBC with Differential:    Lab Results   Component Value Date    WBC 5.0 06/07/2021    RBC 2.68 06/07/2021    HGB 7.8 06/07/2021    HCT 24.3 06/07/2021     06/07/2021    MCV 90.7 06/07/2021    MCH 29.1 06/07/2021    MCHC 32.1 06/07/2021    RDW 13.9 06/07/2021    SEGSPCT 68.9 06/06/2021    BANDSPCT 20 05/18/2021    LYMPHOPCT 15.9 06/06/2021    MONOPCT 11.4 06/06/2021    MYELOPCT 1 05/18/2021    EOSPCT 1.5 12/16/2010    BASOPCT 0.6 06/06/2021    MONOSABS 0.5 06/06/2021    LYMPHSABS 0.7 06/06/2021    EOSABS 0.1 06/06/2021    BASOSABS 0.0 06/06/2021    DIFFTYPE AUTOMATED DIFFERENTIAL 06/06/2021     CMP:    Lab Results   Component Value Date     06/07/2021    K 4.5 06/07/2021     06/07/2021    CO2 18 06/07/2021    BUN 44 06/07/2021    CREATININE 2.0 06/07/2021    GFRAA 44 06/07/2021    LABGLOM 36 06/07/2021    GLUCOSE 123 06/07/2021    PROT 5.7 05/31/2021    PROT 7.1 04/11/2012    LABALBU 2.5 05/31/2021    LABALBU 48 03/27/2021    CALCIUM 7.4 06/07/2021    BILITOT 0.2 05/31/2021    ALKPHOS 67 05/31/2021    AST 12 05/31/2021    ALT 8 05/31/2021     BMP:    Lab Results   Component Value Date     06/07/2021    K 4.5 06/07/2021     06/07/2021    CO2 18 06/07/2021    BUN 44 06/07/2021    LABALBU 2.5 05/31/2021    LABALBU 48 03/27/2021    CREATININE 2.0 06/07/2021 CALCIUM 7.4 06/07/2021    GFRAA 44 06/07/2021    LABGLOM 36 06/07/2021    GLUCOSE 123 06/07/2021       Reviewed pertinent images from this admission (CXR and NM lung scan). IMPRESSION:        Patient Active Problem List:     Hiatal hernia     Diabetes mellitus type 2, improved controlled     Diabetic neuropathy (HCC)     Panic attack     Anxiety associated with depression     Neck pain     DANIELA (obstructive sleep apnea)     Urinary frequency     Bilateral carpal tunnel syndrome- left worse than right     Hyperlipidemia with target LDL less than 100     Essential hypertension     Bronchitis     Osteomyelitis (HCC)     Diabetic ulcer of left midfoot (HCC)     WD-Diabetic ulcer of left midfoot associated with type 2 diabetes mellitus, with fat layer exposed ([address]     Abscess     Periumbilical hernia     WD-Wound, surgical, infected, initial encounter     Sepsis ([address]     Cellulitis of left foot     Constipation     Intractable nausea and vomiting     Uncontrolled type 2 diabetes mellitus (HCC)     Nausea and vomiting     Diabetic foot infection ([address]     Acute renal failure (ARF) (HCC)     Osteomyelitis of fourth toe of right foot (HCC)     Cellulitis     Cellulitis of left arm     Cellulitis, scrotum     Acute epididymitis     Tolu gangrene     Recurrent major depressive disorder, in full remission ([address]     Generalized anxiety disorder     Morbid obesity with body mass index (BMI) of 40.0 to 44.9 in adult [facility])     Necrotizing fasciitis (HCC)     Syncope and collapse      54 y/o M with hx of tolu gangrene now admitted with syncope    PLAN:    -Reviewed lab / images / chart findings personally and with pt.    -Syncope workup per Medical / Cardiology teams    -Wound dressing changes continue with Santyl. Currently no surgical debridement needed.     -Will continue to follow.          Hemalatha Acosta MD

## 2021-06-07 NOTE — CONSULTS
use: Yes     Frequency: 7.0 times per week     Types: Marijuana       ALLERGIES    Allergies   Allergen Reactions    Adhesive Tape Rash    Doxycycline Nausea And Vomiting    Reglan [Metoclopramide] Anxiety       MEDICATIONS    No current facility-administered medications on file prior to encounter. Current Outpatient Medications on File Prior to Encounter   Medication Sig Dispense Refill    oxyCODONE-acetaminophen (PERCOCET) 5-325 MG per tablet Take 1 tablet by mouth every 6 hours as needed for Pain for up to 3 days. Intended supply: 3 days. Take lowest dose possible to manage pain 12 tablet 0    sertraline (ZOLOFT) 50 MG tablet Take 2 tablets by mouth daily 30 tablet 3    vancomycin (VANCOCIN) infusion Infuse 1,500 mg intravenously every 24 hours for 21 days Compound per protocol. 11119 mg 0    metroNIDAZOLE (FLAGYL) 500 MG tablet Take 1 tablet by mouth 3 times daily for 14 days 42 tablet 0    chlorthalidone (HYGROTON) 25 MG tablet Take 1 tablet by mouth daily 30 tablet 3    amLODIPine-benazepril (LOTREL) 5-20 MG per capsule Take 1 capsule by mouth daily 30 capsule 3    aspirin 81 MG EC tablet Take 1 tablet by mouth daily 30 tablet 3    linagliptin (TRADJENTA) 5 MG tablet Take 1 tablet by mouth daily 30 tablet 5    ondansetron (ZOFRAN) 4 MG tablet Take 1 tablet by mouth every 8 hours as needed for Nausea or Vomiting 20 tablet 0    tamsulosin (FLOMAX) 0.4 MG capsule Take 1 capsule by mouth daily 30 capsule 3    Lancets MISC 1 each by Does not apply route daily 100 each 3    glucose monitoring kit (FREESTYLE) monitoring kit 1 each by Does not apply route once for 1 dose.  1 kit 0         Objective:      BP (!) 158/94   Pulse 80   Temp 98.2 °F (36.8 °C) (Oral)   Resp 16   Ht 5' 9\" (1.753 m)   Wt 270 lb (122.5 kg)   SpO2 100%   BMI 39.87 kg/m²   Pradeep Risk Score: Pradeep Scale Score: 19    LABS    CBC:   Lab Results   Component Value Date    WBC 5.0 06/07/2021    RBC 2.68 06/07/2021    HGB 7.8 Cellulitis    Cellulitis of left arm    Cellulitis, scrotum    Acute epididymitis    Irene gangrene    Recurrent major depressive disorder, in full remission ([facility]    Generalized anxiety disorder    Morbid obesity with body mass index (BMI) of 40.0 to 44.9 in adult [facility])    Necrotizing fasciitis (HCC)    Syncope and collapse       Measurements:  Negative Pressure Wound Therapy Groin (Active)   Wound Type Surgical 06/07/21 0935   Unit Type kci 06/07/21 0935   Dressing Type Black foam 06/07/21 0935   Number of pieces used 4 05/21/21 0820   Cycle Continuous 06/07/21 0935   Target Pressure (mmHg) 125 06/07/21 0935   Canister changed?  Yes 06/07/21 0935   Dressing Status Changed 06/07/21 0935   Dressing Changed Changed/New 06/07/21 0935   Drainage Amount Moderate 06/07/21 0935   Drainage Description Serosanguinous 06/07/21 0935   Dressing Change Due 05/28/21 06/07/21 0935   Wound Assessment Other (Comment) 05/19/21 0822   Shazia-wound Assessment Excoriated 06/07/21 0935   Shape irregular 05/17/21 0930   Odor None 06/07/21 0935   Number of days: 21       Wound 07/18/17 Other (Comment) WOUND #2 LEFT PLANTAR (ONSET 3 MTHS) (Active)   Number of days: 1420       Wound 12/03/17 Other (Comment) Foot Left (Active)   Number of days: 1281       Wound 12/08/17 #3 abdoment (onset 3 days ago) SURGICAL (Active)   Number of days: 5208       Wound 12/08/17 #4 Lt plantar (onset 6 months ago) DIABETIC ALCALA 1 (Active)   Number of days: 6213       Wound 05/18/18 # 5 LEFT PLANTAR (ONSET 1 YEAR AGO) DM WAG 1 (Active)   Number of days: 1115       Wound 09/17/18 Left dorsal foot and 4th toe amp site 9/19/18 (Active)   Number of days: 993       Incision 05/22/15 Foot Right (Active)   Number of days: 2207       Incision 12/04/17 Abdomen Mid (Active)   Number of days: 1280       Incision 09/22/18 Foot Left (Active)   Number of days: 988       Wound 01/11/19 left plantar foot wound (Active)   Number of days: 878       Wound 03/21/19 Toe (Comment  which one) Anterior;Right (Active)   Number of days: 809       Wound 03/21/19 Foot Left;Posterior hard callus area (Active)   Number of days: 809       Wound 03/24/21 Left;Plantar (Active)   Number of days: 74       Wound 05/13/21 Left;Plantar (Active)   Wound Etiology Diabetic 06/07/21 0935   Dressing Status Clean;Dry; Intact 06/07/21 0935   Wound Cleansed Cleansed with saline 06/07/21 0935   Dressing/Treatment Dry dressing;ABD 06/07/21 0935   Wound Length (cm) 0.5 cm 06/07/21 0900   Wound Width (cm) 0.8 cm 06/07/21 0900   Wound Depth (cm) 0.1 cm 06/07/21 0900   Wound Surface Area (cm^2) 0.4 cm^2 06/07/21 0900   Change in Wound Size % (l*w) -166.67 06/07/21 0900   Wound Volume (cm^3) 0.04 cm^3 06/07/21 0900   Wound Healing % 50 06/07/21 0900   Distance Tunneling (cm) 0 cm 06/07/21 0935   Tunneling Position ___ O'Clock 0 06/07/21 0935   Undermining Starts ___ O'Clock 0 06/07/21 0935   Undermining Ends___ O'Clock 0 06/07/21 0935   Undermining Maxium Distance (cm) 0 06/07/21 0935   Wound Assessment Dry;Eschar dry 06/07/21 0935   Drainage Amount None 06/07/21 0935   Drainage Description Serosanguinous 06/07/21 0935   Odor None 06/07/21 0935   Shazia-wound Assessment Intact 06/07/21 0935   Margins Defined edges 06/07/21 0935   Wound Thickness Description not for Pressure Injury Full thickness 06/07/21 0935   Number of days: 24       Wound 05/17/21 Groin Right scrotum extends to lower buttock (Active)   Wound Etiology Non-Healing Surgical 06/07/21 0935   Dressing Status New dressing applied 06/07/21 0935   Wound Cleansed Cleansed with saline 06/07/21 0935   Dressing/Treatment ABD; Moist to dry 06/07/21 0935   Wound Length (cm) 20 cm 06/07/21 0900   Wound Width (cm) 10 cm 06/07/21 0900   Wound Depth (cm) 6 cm 06/07/21 0900   Wound Surface Area (cm^2) 200 cm^2 06/07/21 0900   Change in Wound Size % (l*w) 40.74 06/07/21 0900   Wound Volume (cm^3) 1200 cm^3 06/07/21 0900   Wound Healing % 11 06/07/21 0900   Distance Tunneling (cm) 0 cm 06/07/21 0935   Tunneling Position ___ O'Clock 0 06/07/21 0935   Undermining Starts ___ O'Clock 0 06/07/21 0935   Undermining Ends___ O'Clock 0 06/07/21 0935   Undermining Maxium Distance (cm) 0 06/07/21 0935   Wound Assessment Pink/red;Slough 06/07/21 0935   Drainage Amount Small 06/07/21 0935   Drainage Description Yellow;Serosanguinous 06/07/21 0935   Odor None 06/07/21 0935   Shazia-wound Assessment Intact 05/25/21 1400   Margins Defined edges 06/07/21 0935   Wound Thickness Description not for Pressure Injury Full thickness 06/07/21 0935   Number of days: 21       Response to treatment:  With complaints of pain. Pain Assessment:  Severity:  mild  Quality of pain: sore  Wound Pain Timing/Severity: dressing change  Premedicated: no    Plan:     Plan of Care: Wound 05/13/21 Left;Plantar-Dressing/Treatment: Dry dressing, ABD  Wound 05/17/21 Groin Right scrotum extends to lower buttock-Dressing/Treatment: ABD, Moist to dry     Patient in bed agreeable to wound care assessment perineum/scrotal wound from necrotizing fasciitis. Wound care saw pt a week ago pt was  discharged now admitted again. Dressing to rt groin removed cleansed with NS measured and pictured dressing with NS moist kerlix  as above. Recommend santyl ointment with dressings as there is necrotic tissue. Recommend  Dr Rosario Camp to see pt to eval wound and possible debridement he did previous debridements. Pt is generally not at risk for skin breakdown AEB nicole. Follow nicole orders. Specialty Bed Required : no  [] Low Air Loss   [] Pressure Redistribution  [] Fluid Immersion  [] Bariatric  [] Total Pressure Relief  [] Other:     Discharge Plan:  Placement for patient upon discharge: home  Hospice Care: yes  Patient appropriate for Outpatient [address]: pt to follow with Dr Rosario Camp. Patient/Caregiver Teaching:  Level of patient/caregiver understanding able to:  Pt verbalized understanding of wound care dressing. Electronically signed by Melissa Lema.  ROBIN Salvador,  on 6/7/2021 at 11:48 AM

## 2021-06-07 NOTE — PROGRESS NOTES
Attending Progress Note      PCP: Mark White MD      Patient: Chilango Tan   Gender: male  : 1975   Age: 55 y.o. MRN: [id number]  Room  : [address] [place] [address]      Date of Admission: 2021    Chief Complaint   Patient presents with    Wound Check     Open wound to scrotum. Had a coughing fit at home and was incontinent and got stool in his wound           Subjective:  Pain at right groin .  No CP           Medications:  Reviewed  Infusion Medications    dextrose      sodium chloride 100 mL/hr at 21 0128    sodium chloride       Scheduled Medications    aspirin  81 mg Oral Daily    tamsulosin  0.4 mg Oral Daily    sertraline  100 mg Oral Daily    metroNIDAZOLE  500 mg Oral TID    sodium chloride flush  5-40 mL Intravenous 2 times per day    heparin (porcine)  5,000 Units Subcutaneous 3 times per day    insulin glargine  0.15 Units/kg Subcutaneous Nightly    insulin lispro  0.05 Units/kg Subcutaneous TID WC    insulin lispro  0-6 Units Subcutaneous TID WC    insulin lispro  0-3 Units Subcutaneous Nightly    guaiFENesin  1,200 mg Oral BID    vancomycin (VANCOCIN) intermittent dosing (placeholder)   Other See Admin Instructions     PRN Meds: ondansetron, oxyCODONE-acetaminophen, glucose, dextrose, glucagon (rDNA), dextrose, sodium chloride flush, sodium chloride, potassium chloride **OR** potassium alternative oral replacement **OR** potassium chloride, magnesium sulfate, promethazine **OR** ondansetron, polyethylene glycol, fleet, acetaminophen **OR** acetaminophen, zolpidem, guaiFENesin-dextromethorphan, benzonatate, calcium carbonate, morphine      Intake/Output Summary (Last 24 hours) at 2021 0054  Last data filed at 2021 2230  Gross per 24 hour   Intake 20 ml   Output 1000 ml   Net -980 ml       Exam:  /86   Pulse 89   Temp 98.3 °F (36.8 °C) (Oral)   Resp 18   Ht 5' 9\" (1.753 m)   Wt 270 lb (122.5 kg)   SpO2 100%   BMI 39.87 kg/m²   General appearance: No distress,   Respiratory:  good air entry , no Rales , No wheezing, or rhonchi,  Cardiovascular: RRR, with normal S1/S2 . Abdomen : Soft, non-tender, non-distended  , normal bowel sounds. Legs : No edema bilaterally. No DVT signs ,    Neurologic:  Alert and oriented ,        Labs:   Recent Labs     06/05/21  1450 06/06/21  1240   WBC 7.2 4.7   HGB 9.3* 8.9*   HCT 28.1* 26.7*    315     Recent Labs     06/05/21  1450 06/06/21  1240    134*   K 4.4 5.1    107   CO2 20* 17*   BUN 51* 49*   CREATININE 2.5* 2.2*   CALCIUM 8.4 7.9*     No results for input(s): AST, ALT, BILIDIR, BILITOT, ALKPHOS in the last 72 hours. No results for input(s): INR in the last 72 hours. No results for input(s): Renata Barnett in the last 72 hours. Assessment/Plan:  Active Hospital Problems    Diagnosis Date Noted    Syncope and collapse [R55] 06/05/2021   DM II uncontrolled   ARF on CKD   Depression   Recent h/o Irene abscess s/p debridement on IV Vancomycin   Elevated troponin       Assessment/Plan:   Tele : no event - no fever - on room air   IVF , monitor renal function. . improving  , hold diuretics , nephro input noted   Cont IV ABX Vancomycin and PO flagyl   cardiac markers : elevated , cardiologist input for work up   Glucose control, insulin coverage   Pain control   Home meds , reviewed and resumed as appropriate   Symptoms releif/Pain control  DVT Prophylaxis   Diet: ADULT DIET; Regular; 4 carb choices (60 gm/meal)  Code Status: Full Code        [unfilled]    Treatment progress and plan was d/w pt/family .     Chrissy Pedro MD

## 2021-06-07 NOTE — PROGRESS NOTES
Comprehensive Nutrition Assessment    Type and Reason for Visit:  Initial, Positive Nutrition Screen, Wound    Nutrition Recommendations/Plan:   · Continue Carb Control 5 diet, to better meet estimated needs for taller statue  · Add wound healing supplements BID   · Add diabetic oral nutrition supplement qd   · Start snacks at meal times  · Please obtain measured bed weight     Nutrition Assessment:  Admitted for syncope, collapse at home with incontinence and had stool infect debridement site-- groin perineal infection. RD is familiar to patient. H/o diabetic ulcers, necrotizing fascitis, DMII, HTN, HLD. Pt with multiple non-healing/DM ulcers, and neg wound vac on groin area. Pt asleep at visit, observed no signs of malnutrition, but noted some weight loss over 3 months. +brown emesis +last BM 6/6/21 +some edema noted. No po intake documented yet. Will send supplements, as pt agreed to [facility] during last admission. Pt at high nutrition risk. Malnutrition Assessment:  Malnutrition Status:  Insufficient data    Context:  Acute Illness       Estimated Daily Nutrient Needs:  Energy (kcal):  7195-7888 (1.1-1.2 stress factor, [place]-St. Jeor); Weight Used for Energy Requirements:  Current     Protein (g):   (1.2-1.5 g/kg); Weight Used for Protein Requirements:  Ideal        Fluid (ml/day):  ~2400, or fluids mgt per physician; Method Used for Fluid Requirements:  1 ml/kcal      Nutrition Related Findings:  rx: zofran , BUN 44, Cr 2 RBC 2.68      Wounds:  Multiple, Wound Vac, Diabetic Ulcer, Surgical Incision       Current Nutrition Therapies:    ADULT DIET; Regular; 5 carb choices (75 gm/meal)  Adult Oral Nutrition Supplement;  Wound Healing Oral Supplement  Adult Oral Nutrition Supplement; Diabetic Oral Supplement    Anthropometric Measures:  · Height: 5' 9\" (175.3 cm)  · Current Body Weight: 270 lb 1 oz (122.5 kg)   · Admission Body Weight:  (stated)    · Usual Body Weight: 274 lb 11.1 oz (124.6 kg) (3/23/21 per encounters)     · Ideal Body Weight: 160 lbs; % Ideal Body Weight 168.8 %   · BMI: 39.9  · Adjusted Body Weight:  ; No Adjustment (Noted toe amputation, 2019)   · BMI Categories: Obese Class 2 (BMI 35.0 -39.9)       Nutrition Diagnosis:   · Inadequate protein-energy intake related to increase demand for energy/nutrients as evidenced by wounds    Nutrition Interventions:   Food and/or Nutrient Delivery:  Modify Current Diet, Start Oral Nutrition Supplement  Nutrition Education/Counseling:  Education not appropriate (c/o pain)   Coordination of Nutrition Care:  Continue to monitor while inpatient, Coordination of Community Care    Goals:  Pt will consume at least 50% of meals and supplements       Nutrition Monitoring and Evaluation:   Behavioral-Environmental Outcomes:  None Identified   Food/Nutrient Intake Outcomes:  Diet Advancement/Tolerance, Food and Nutrient Intake, Supplement Intake  Physical Signs/Symptoms Outcomes:  Biochemical Data, GI Status, Fluid Status or Edema, Skin, Weight     Discharge Planning:     Too soon to determine     Electronically signed by Mark Polo RD, LD on 6/7/21 at 3:40 PM EDT    Contact: [phone]

## 2021-06-08 ENCOUNTER — APPOINTMENT (OUTPATIENT)
Dept: CT IMAGING | Age: 46
DRG: 074 | End: 2021-06-08
Payer: MEDICARE

## 2021-06-08 ENCOUNTER — APPOINTMENT (OUTPATIENT)
Dept: NUCLEAR MEDICINE | Age: 46
DRG: 074 | End: 2021-06-08
Payer: MEDICARE

## 2021-06-08 LAB
ALBUMIN SERPL-MCNC: 2.6 GM/DL (ref 3.4–5)
ALP BLD-CCNC: 62 IU/L (ref 40–128)
ALT SERPL-CCNC: 8 U/L (ref 10–40)
ANION GAP SERPL CALCULATED.3IONS-SCNC: 8 MMOL/L (ref 4–16)
AST SERPL-CCNC: 12 IU/L (ref 15–37)
BILIRUB SERPL-MCNC: 0.2 MG/DL (ref 0–1)
BUN BLDV-MCNC: 40 MG/DL (ref 6–23)
CALCIUM SERPL-MCNC: 8 MG/DL (ref 8.3–10.6)
CHLORIDE BLD-SCNC: 108 MMOL/L (ref 99–110)
CO2: 19 MMOL/L (ref 21–32)
CREAT SERPL-MCNC: 2.1 MG/DL (ref 0.9–1.3)
DOSE AMOUNT: NORMAL
DOSE TIME: NORMAL
GFR AFRICAN AMERICAN: 41 ML/MIN/1.73M2
GFR NON-AFRICAN AMERICAN: 34 ML/MIN/1.73M2
GLUCOSE BLD-MCNC: 168 MG/DL (ref 70–99)
GLUCOSE BLD-MCNC: 173 MG/DL (ref 70–99)
GLUCOSE BLD-MCNC: 189 MG/DL (ref 70–99)
GLUCOSE BLD-MCNC: 192 MG/DL (ref 70–99)
GLUCOSE BLD-MCNC: 329 MG/DL (ref 70–99)
HCT VFR BLD CALC: 25.1 % (ref 42–52)
HEMOGLOBIN: 8 GM/DL (ref 13.5–18)
LV EF: 41 %
LVEF MODALITY: NORMAL
MCH RBC QN AUTO: 28.6 PG (ref 27–31)
MCHC RBC AUTO-ENTMCNC: 31.9 % (ref 32–36)
MCV RBC AUTO: 89.6 FL (ref 78–100)
PDW BLD-RTO: 13.8 % (ref 11.7–14.9)
PLATELET # BLD: 318 K/CU MM (ref 140–440)
PMV BLD AUTO: 9.7 FL (ref 7.5–11.1)
POTASSIUM SERPL-SCNC: 5 MMOL/L (ref 3.5–5.1)
RBC # BLD: 2.8 M/CU MM (ref 4.6–6.2)
SODIUM BLD-SCNC: 135 MMOL/L (ref 135–145)
TOTAL PROTEIN: 5.4 GM/DL (ref 6.4–8.2)
VANCOMYCIN RANDOM: 17.9 UG/ML
WBC # BLD: 4.6 K/CU MM (ref 4–10.5)

## 2021-06-08 PROCEDURE — 93017 CV STRESS TEST TRACING ONLY: CPT

## 2021-06-08 PROCEDURE — 94761 N-INVAS EAR/PLS OXIMETRY MLT: CPT

## 2021-06-08 PROCEDURE — 99232 SBSQ HOSP IP/OBS MODERATE 35: CPT | Performed by: SURGERY

## 2021-06-08 PROCEDURE — 80053 COMPREHEN METABOLIC PANEL: CPT

## 2021-06-08 PROCEDURE — 2580000003 HC RX 258: Performed by: FAMILY MEDICINE

## 2021-06-08 PROCEDURE — 78452 HT MUSCLE IMAGE SPECT MULT: CPT

## 2021-06-08 PROCEDURE — 87070 CULTURE OTHR SPECIMN AEROBIC: CPT

## 2021-06-08 PROCEDURE — 99232 SBSQ HOSP IP/OBS MODERATE 35: CPT | Performed by: INTERNAL MEDICINE

## 2021-06-08 PROCEDURE — 87186 SC STD MICRODIL/AGAR DIL: CPT

## 2021-06-08 PROCEDURE — APPSS45 APP SPLIT SHARED TIME 31-45 MINUTES: Performed by: NURSE PRACTITIONER

## 2021-06-08 PROCEDURE — 80202 ASSAY OF VANCOMYCIN: CPT

## 2021-06-08 PROCEDURE — 1200000000 HC SEMI PRIVATE

## 2021-06-08 PROCEDURE — 94664 DEMO&/EVAL PT USE INHALER: CPT

## 2021-06-08 PROCEDURE — A9500 TC99M SESTAMIBI: HCPCS | Performed by: NURSE PRACTITIONER

## 2021-06-08 PROCEDURE — 74176 CT ABD & PELVIS W/O CONTRAST: CPT

## 2021-06-08 PROCEDURE — 94150 VITAL CAPACITY TEST: CPT

## 2021-06-08 PROCEDURE — 3430000000 HC RX DIAGNOSTIC RADIOPHARMACEUTICAL: Performed by: NURSE PRACTITIONER

## 2021-06-08 PROCEDURE — 87077 CULTURE AEROBIC IDENTIFY: CPT

## 2021-06-08 PROCEDURE — 6370000000 HC RX 637 (ALT 250 FOR IP): Performed by: NURSE PRACTITIONER

## 2021-06-08 PROCEDURE — 6370000000 HC RX 637 (ALT 250 FOR IP): Performed by: FAMILY MEDICINE

## 2021-06-08 PROCEDURE — 2580000003 HC RX 258: Performed by: INTERNAL MEDICINE

## 2021-06-08 PROCEDURE — 99223 1ST HOSP IP/OBS HIGH 75: CPT | Performed by: INTERNAL MEDICINE

## 2021-06-08 PROCEDURE — 85027 COMPLETE CBC AUTOMATED: CPT

## 2021-06-08 PROCEDURE — 82962 GLUCOSE BLOOD TEST: CPT

## 2021-06-08 PROCEDURE — 87075 CULTR BACTERIA EXCEPT BLOOD: CPT

## 2021-06-08 PROCEDURE — 6360000002 HC RX W HCPCS: Performed by: INTERNAL MEDICINE

## 2021-06-08 PROCEDURE — 36592 COLLECT BLOOD FROM PICC: CPT

## 2021-06-08 PROCEDURE — 6360000002 HC RX W HCPCS: Performed by: FAMILY MEDICINE

## 2021-06-08 RX ORDER — ONDANSETRON 2 MG/ML
4 INJECTION INTRAMUSCULAR; INTRAVENOUS ONCE
Status: DISCONTINUED | OUTPATIENT
Start: 2021-06-08 | End: 2021-06-15 | Stop reason: HOSPADM

## 2021-06-08 RX ORDER — AMLODIPINE BESYLATE 10 MG/1
10 TABLET ORAL DAILY
Status: DISCONTINUED | OUTPATIENT
Start: 2021-06-09 | End: 2021-06-15 | Stop reason: HOSPADM

## 2021-06-08 RX ORDER — AMINOPHYLLINE DIHYDRATE 25 MG/ML
75 INJECTION, SOLUTION INTRAVENOUS ONCE
Status: COMPLETED | OUTPATIENT
Start: 2021-06-08 | End: 2021-06-08

## 2021-06-08 RX ORDER — ATORVASTATIN CALCIUM 40 MG/1
40 TABLET, FILM COATED ORAL NIGHTLY
Status: DISCONTINUED | OUTPATIENT
Start: 2021-06-08 | End: 2021-06-15 | Stop reason: HOSPADM

## 2021-06-08 RX ADMIN — AMINOPHYLLINE 75 MG: 25 INJECTION, SOLUTION INTRAVENOUS at 11:49

## 2021-06-08 RX ADMIN — OXYCODONE HYDROCHLORIDE AND ACETAMINOPHEN 1 TABLET: 5; 325 TABLET ORAL at 13:58

## 2021-06-08 RX ADMIN — ZOLPIDEM TARTRATE 5 MG: 5 TABLET ORAL at 23:16

## 2021-06-08 RX ADMIN — HEPARIN SODIUM 5000 UNITS: 5000 INJECTION INTRAVENOUS; SUBCUTANEOUS at 21:08

## 2021-06-08 RX ADMIN — REGADENOSON 0.4 MG: 0.08 INJECTION, SOLUTION INTRAVENOUS at 11:44

## 2021-06-08 RX ADMIN — ATORVASTATIN CALCIUM 40 MG: 40 TABLET, FILM COATED ORAL at 20:45

## 2021-06-08 RX ADMIN — SODIUM CHLORIDE, PRESERVATIVE FREE 10 ML: 5 INJECTION INTRAVENOUS at 20:49

## 2021-06-08 RX ADMIN — INSULIN GLARGINE 18 UNITS: 100 INJECTION, SOLUTION SUBCUTANEOUS at 21:11

## 2021-06-08 RX ADMIN — Medication 2 MG: at 09:01

## 2021-06-08 RX ADMIN — INSULIN LISPRO 1 UNITS: 100 INJECTION, SOLUTION INTRAVENOUS; SUBCUTANEOUS at 14:00

## 2021-06-08 RX ADMIN — PIPERACILLIN AND TAZOBACTAM 3375 MG: 3; .375 INJECTION, POWDER, LYOPHILIZED, FOR SOLUTION INTRAVENOUS at 18:03

## 2021-06-08 RX ADMIN — HEPARIN SODIUM 5000 UNITS: 5000 INJECTION INTRAVENOUS; SUBCUTANEOUS at 04:41

## 2021-06-08 RX ADMIN — OXYCODONE HYDROCHLORIDE AND ACETAMINOPHEN 1 TABLET: 5; 325 TABLET ORAL at 23:16

## 2021-06-08 RX ADMIN — OXYCODONE HYDROCHLORIDE AND ACETAMINOPHEN 1 TABLET: 5; 325 TABLET ORAL at 04:40

## 2021-06-08 RX ADMIN — INSULIN LISPRO 4 UNITS: 100 INJECTION, SOLUTION INTRAVENOUS; SUBCUTANEOUS at 18:04

## 2021-06-08 RX ADMIN — METOPROLOL TARTRATE 25 MG: 25 TABLET, FILM COATED ORAL at 20:45

## 2021-06-08 RX ADMIN — ONDANSETRON 4 MG: 2 INJECTION INTRAMUSCULAR; INTRAVENOUS at 09:01

## 2021-06-08 RX ADMIN — Medication 30 MILLICURIE: at 13:02

## 2021-06-08 RX ADMIN — AMLODIPINE BESYLATE 5 MG: 5 TABLET ORAL at 13:58

## 2021-06-08 RX ADMIN — METRONIDAZOLE 500 MG: 250 TABLET ORAL at 13:58

## 2021-06-08 RX ADMIN — INSULIN LISPRO 1 UNITS: 100 INJECTION, SOLUTION INTRAVENOUS; SUBCUTANEOUS at 09:04

## 2021-06-08 RX ADMIN — HEPARIN SODIUM 5000 UNITS: 5000 INJECTION INTRAVENOUS; SUBCUTANEOUS at 13:58

## 2021-06-08 RX ADMIN — Medication 10 MILLICURIE: at 13:02

## 2021-06-08 RX ADMIN — GUAIFENESIN 1200 MG: 600 TABLET, EXTENDED RELEASE ORAL at 20:45

## 2021-06-08 RX ADMIN — METRONIDAZOLE 500 MG: 250 TABLET ORAL at 04:40

## 2021-06-08 RX ADMIN — Medication 2 MG: at 20:45

## 2021-06-08 ASSESSMENT — PAIN DESCRIPTION - ONSET
ONSET: ON-GOING

## 2021-06-08 ASSESSMENT — PAIN SCALES - GENERAL
PAINLEVEL_OUTOF10: 7
PAINLEVEL_OUTOF10: 0
PAINLEVEL_OUTOF10: 5
PAINLEVEL_OUTOF10: 8
PAINLEVEL_OUTOF10: 7
PAINLEVEL_OUTOF10: 7
PAINLEVEL_OUTOF10: 0
PAINLEVEL_OUTOF10: 6
PAINLEVEL_OUTOF10: 7
PAINLEVEL_OUTOF10: 0
PAINLEVEL_OUTOF10: 0
PAINLEVEL_OUTOF10: 7

## 2021-06-08 ASSESSMENT — PAIN DESCRIPTION - FREQUENCY
FREQUENCY: CONTINUOUS

## 2021-06-08 ASSESSMENT — PAIN DESCRIPTION - PAIN TYPE
TYPE: CHRONIC PAIN

## 2021-06-08 ASSESSMENT — ENCOUNTER SYMPTOMS
GASTROINTESTINAL NEGATIVE: 1
ALLERGIC/IMMUNOLOGIC NEGATIVE: 1
EYES NEGATIVE: 1
RESPIRATORY NEGATIVE: 1

## 2021-06-08 ASSESSMENT — PAIN DESCRIPTION - DESCRIPTORS
DESCRIPTORS: THROBBING

## 2021-06-08 ASSESSMENT — PAIN DESCRIPTION - LOCATION
LOCATION: SCROTUM

## 2021-06-08 ASSESSMENT — PAIN DESCRIPTION - PROGRESSION
CLINICAL_PROGRESSION: NOT CHANGED

## 2021-06-08 ASSESSMENT — PAIN - FUNCTIONAL ASSESSMENT
PAIN_FUNCTIONAL_ASSESSMENT: PREVENTS OR INTERFERES SOME ACTIVE ACTIVITIES AND ADLS

## 2021-06-08 ASSESSMENT — PAIN DESCRIPTION - ORIENTATION: ORIENTATION: RIGHT;LEFT;ANTERIOR;MID;POSTERIOR

## 2021-06-08 NOTE — CONSULTS
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 Culture positive for diphtheroids, Staphylococcus epidermidis and Clostridium innocuum. At this time, will need to rule out pseudomonal and fungal infection.  Syncope:    CARMEN on CKD 3   Multi-morbidity: per PMHx type 2 diabetes with polyneuropathy, CKD stage III  Plan:   Therapeutic: Discontinue vancomycin and meropenem. Start Zosyn   Diagnostic: Wound culture, trend CRP and procalcitonin   F/u test:     Thank you for allowing me to consult in the care of this patient.  ------------------------  REASON FOR CONSULT: Infective syndrome   Requested by: Dr. Geni Robbins is a 55 y.o.  male with a medical history of type 2 diabetes mellitus, polyneuropathy, peripheral autonomic neuropathy, CKD stage III known to me from previous admission on 2021. He was treated for Irene's gangrene of the scrotum underwent debridement on 5/15/2021 and 2021. Surgical cultures were positive for diphtheroids, Staphylococcus epidermidis and Clostridium innocuum. He was discharged to receive 1 dose of dalbavancin 1500 mg and metronidazole 500 mg p.o. 3 times daily for 2 weeks. He did not receive dalbavancin, only received metronidazole. He was readmitted on 2021 for worsening pain and difficulty apply wound VAC. CT had revealed he was admitted 2021 for further evaluation and management of syncope that led to loss of consciousness and bowel continence. His surgical wound was contaminated with feces. He is receiving vancomycin and metronidazole. ? Infectious diseases service was consulted to evaluate the pt, and recommend further investigative and therapeutic measures.   Review and summary of old records:  ROS: Other systems reviewed Including eyes, ENT, respiratory, cardiovascular, GI, , dermatologic, neurologic, psych, hem/lymphatic, musculoskeletal and endocrine were negative other than what is mentioned above.    Unable to obtain; pt on vent  Patient Active Problem List    Diagnosis Date Noted    Recurrent major depressive disorder, in full remission ([address] 05/18/2021    Generalized anxiety disorder 05/18/2021    Syncope and collapse 06/05/2021    Necrotizing fasciitis ([address] 05/24/2021    Morbid obesity with body mass index (BMI) of 40.0 to 44.9 in adult [facility]) 05/21/2021    Irene gangrene     Cellulitis, scrotum 05/13/2021    Acute epididymitis     Cellulitis of left arm 04/03/2021    Cellulitis 03/23/2021    Osteomyelitis of fourth toe of right foot ([address] 08/07/2019    Acute renal failure (ARF) ([address] 08/04/2019    Diabetic foot infection ([address] 03/21/2019    Intractable nausea and vomiting 01/11/2019    Uncontrolled type 2 diabetes mellitus ([address] 01/11/2019    Nausea and vomiting 01/11/2019    Constipation 10/07/2018    Cellulitis of left foot     Sepsis ([address] 09/15/2018    WD-Wound, surgical, infected, initial encounter 07/98/9360    Periumbilical hernia     Abscess 12/03/2017    Diabetic ulcer of left midfoot ([address] 07/18/2017    WD-Diabetic ulcer of left midfoot associated with type 2 diabetes mellitus, with fat layer exposed ([address] 07/18/2017    Osteomyelitis ([address] 05/22/2015    Bronchitis 10/15/2013    Hyperlipidemia with target LDL less than 100 07/15/2013    Essential hypertension 07/15/2013    Bilateral carpal tunnel syndrome- left worse than right 06/24/2013    DANIELA (obstructive sleep apnea) 06/20/2013    Urinary frequency 06/20/2013    Neck pain 05/16/2013    Anxiety associated with depression     Panic attack 02/01/2012    Diabetic neuropathy ([address] 12/22/2011    Hiatal hernia 02/04/2011    Diabetes mellitus type 2, improved controlled 02/04/2011     Past Medical History:   Diagnosis Date    Abscess 2010    scrotal    Acute renal failure (ARF) ([address] 8/4/2019    Anxiety associated with depression     Anxiety associated with depression     Back pain 7/2/2012    Chest pain 5/1/2013    Diabetic nephropathy ([address]     Diabetic neuropathy ([address] 12/22/2011    Diabetic ulcer of left midfoot associated with type 2 diabetes mellitus, with fat layer exposed ([address] 7/18/2017    Diverticulosis     C scope + Dr. Pam Zhao DM (diabetes mellitus), type 2 ([address] 2002    DR. 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 Pam Zhao Necrotizing fasciitis [facility])     Nose fracture 1988    Panic attacks     Pericarditis 2003    Hospitalized with s/p heart cath normal    Peripheral autonomic neuropathy due to diabetes mellitus ([address]     Axonal EMG- NCS, March 2011    Sebaceous cyst 9/1/2011    URI (upper respiratory infection) 2/27/2012    WD-Wound, surgical, infected, initial encounter 12/8/2017    Wrist fracture 1986      Past Surgical History:   Procedure Laterality Date   [address] 2003, 2013    Normal (Dr. Owen Massey)    COLONOSCOPY  6/2/2011    Pandiverticulosis, Nonbleeding internal hemorrhoids, Repeat colonoscopy at age 48- Dr. Blaine Habermann    \"had reconstruction surgery on nose a year after the other nose surgery\"    OTHER SURGICAL HISTORY Right 05/22/2015    I & D right great toe with partial amputation    OTHER SURGICAL HISTORY  12/04/2017    inc and drainage of abcess    UT DEEP INCIS FOOT BONE INFECTN Left 9/22/2018    LEFT FOOT DEBRIDEMENT INCISION AND DRAINAGE TOP AND BOTTOM performed by Quincy Blood DPM at [facility] N/A 5/15/2021    SCROTAL AND PERINEAL DEBRIDEMENT performed by Faina Monzon MD at [address] [place] [address] 5/16/2021    SCROTAL RE-DEBRIDEMENT  INCISION AND DRAINAGE performed by Faina Monzon MD at [address] [place] [address] [id number]    sebaceous cyst removal times 4     TOE AMPUTATION      right great toe    TOE AMPUTATION Right 3/23/2019    TOE AMPUTATION RIGHT 5TH TOE performed by Susannah Zaidi DPM at [facility] TOE AMPUTATION Right 8/6/2019    TOE AMPUTATION RIGHT 4TH TOE performed by Susannah Zaidi DPM at [address] [place] [address] Se UPPER GASTROINTESTINAL ENDOSCOPY  06/2/2011    Mild gastritis with moderatley severe antritis, small hiatal hernia, Dr. Tulio Bansal N/A 1/12/2019    EGD BIOPSY performed by Wendy Nuñez MD at [address] [facility],[address] History   Problem Relation Age of Onset   Maryanne Manual Cancer Mother         ?site   Maryanne Manual Stroke Mother     Bleeding Prob Mother     Diabetes Father     Heart Disease Father     High Blood Pressure Father     Obesity Father     Kidney Disease Father     Diabetes Sister     High Blood Pressure Sister     Mental Illness Sister     Obesity Sister     High Blood Pressure Other     Mental Illness Other         bipolar    Other Son         cyst in ear canal    ADHD Daughter       Infectious disease related family history - not contibutory. SOCIAL HISTORY  Social History     Tobacco Use    Smoking status: Former Smoker     Years: 20.00     Quit date: 11/18/2017     Years since quitting: 3.5    Smokeless tobacco: Never Used   Substance Use Topics    Alcohol use: Yes     Comment: occ       Born:   Lived   Occupation:   No recent travel of significance.  No recent unusual exposures.  NO pets    ? ALLERGIES  Allergies   Allergen Reactions    Adhesive Tape Rash    Doxycycline Nausea And Vomiting    Reglan [Metoclopramide] Anxiety      MEDICATIONS  Reviewed and are per the chart/EMR.    IMMUNIZATION HISTORY  Immunization History   Administered Date(s) Administered    Influenza 10/15/2013    Influenza, Quadv, 6 mo and older, IM, PF (Flulaval, Fluarix) 09/19/2018    Pneumococcal Polysaccharide ([id number]) 05/02/2013

## 2021-06-08 NOTE — PROGRESS NOTES
[facility]  consulted by Dr. Coretta Thomas for monitoring and adjustment. Indication for treatment: Irene's gangrene  Goal trough: 10 - 15 mcg/mL    Pertinent Laboratory Values:   Temp Readings from Last 3 Encounters:   06/08/21 98 °F (36.7 °C) (Oral)   05/28/21 97.7 °F (36.5 °C) (Oral)   05/23/21 98.5 °F (36.9 °C) (Oral)     Recent Labs     06/06/21  1240 06/07/21  0524 06/08/21  0445   WBC 4.7 5.0 4.6     Recent Labs     06/06/21  1240 06/07/21  0524 06/08/21  0445   BUN 49* 44* 40*   CREATININE 2.2* 2.0* 2.1*     Estimated Creatinine Clearance: 57 mL/min (A) (based on SCr of 2.1 mg/dL (H)).     Intake/Output Summary (Last 24 hours) at 6/8/2021 0707  Last data filed at 6/7/2021 2158  Gross per 24 hour   Intake 10 ml   Output 500 ml   Net -490 ml       Pertinent Cultures:  Date    Source    Results  05/15   Perineal cx   Staph Epidermidis           Vancomycin level:   RANDOM:    Recent Labs     06/06/21  0425 06/07/21  0524 06/08/21  0445   VANCORANDOM 20.5 14.0 17.9       Assessment:  · No leukocytosis; afebrile  · CARMEN with History of CKD  · Scr trend stable  · Day(s) of therapy: 3 inpatient (Continuation of outpatient therapy)  · Vancomycin concentration:   · 06/06 - 20.5, held  · 06/07 - 14, appropriate to re-dose  · 06/08 - 17.9, held     Plan:  · Patient prescribed Vancomycin 1,500 mg IV Q 24 Hours as outpatient  · Continue intermittent dosing based on levels while in CARMEN  · Patient received 1250 mg IVPB x1 yesterday  · Level today is therapeutic, no need to re-dose  · Repeat next random level tomorrow AM  · Pharmacy will continue to monitor patient and adjust therapy as indicated    VANCOMYCIN CONCENTRATION SCHEDULED FOR 06/09/2021 @ 0600    Thank you for the consult,  Domenico Aleman [facility]  6/8/2021 7:07 AM

## 2021-06-08 NOTE — PROGRESS NOTES
General Surgery-Dr. Rashad Jackson Day: 4    Chief Complaint on Admission: syncope      Subjective:     Pt seen by Wound Team.  Pt having stress test done today. Mild right upper groin pain. Otherwise without complaints. ROS:  Review of Systems   Constitutional: Negative for chills and fever. HENT: Negative. Eyes: Negative. Respiratory: Negative. Gastrointestinal: Negative. Endocrine: Negative. Genitourinary: Negative for difficulty urinating, discharge and scrotal swelling. Musculoskeletal: Negative. Skin: Positive for wound. Allergic/Immunologic: Negative. Neurological: Positive for syncope. Hematological: Negative. Psychiatric/Behavioral: Negative. Allergies  Adhesive tape, Doxycycline, and Reglan [metoclopramide]          Diagnosis Date    Abscess 2010    scrotal    Acute renal failure (ARF) ([address] 8/4/2019    Anxiety associated with depression     Anxiety associated with depression     Back pain 7/2/2012    Chest pain 5/1/2013    Diabetic nephropathy ([address]     Diabetic neuropathy ([address] 12/22/2011    Diabetic ulcer of left midfoot associated with type 2 diabetes mellitus, with fat layer exposed ([address] 7/18/2017    Diverticulosis     C scope + Dr. Monica Mcmahan DM (diabetes mellitus), type 2 ([address] 2002    DR. 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 Monica Mcmahan Necrotizing fasciitis [facility])     Nose fracture 1988    Panic attacks     Pericarditis 2003    Hospitalized with s/p heart cath normal    Peripheral autonomic neuropathy due to diabetes mellitus ([address]     Axonal EMG- NCS, March 2011    Sebaceous cyst 9/1/2011    URI (upper respiratory infection) 2/27/2012    WD-Wound, surgical, infected, initial encounter 12/8/2017    Wrist fracture 1986       Objective:     Vitals:    06/08/21 0906   BP:    Pulse:    Resp: 16   Temp:    SpO2: 95% TEMPERATURE:  Current -Temp: 98 °F (36.7 °C); Max - Temp  Av.2 °F (36.8 °C)  Min: 98 °F (36.7 °C)  Max: 98.3 °F (36.8 °C)    No intake/output data recorded. I/O last 3 completed shifts: In: 10 [I.V.:10]  Out: 500 [Urine:500]      Physical Exam:  Physical Exam  Constitutional:       General: He is not in acute distress. Appearance: He is not ill-appearing, toxic-appearing or diaphoretic. HENT:      Head: Normocephalic and atraumatic. Right Ear: External ear normal.      Left Ear: External ear normal.      Nose: Nose normal.   Eyes:      General:         Right eye: No discharge. Left eye: No discharge. Extraocular Movements: Extraocular movements intact. Cardiovascular:      Rate and Rhythm: Normal rate. Pulmonary:      Effort: Pulmonary effort is normal.   Abdominal:      Palpations: Abdomen is soft. Tenderness: There is no abdominal tenderness. Genitourinary:     Comments: Groin wound with packing in place, beefy red base without drainage or odor. Some minimal yellow tissue sloughing present. Musculoskeletal:         General: No swelling. Cervical back: Normal range of motion. Skin:     General: Skin is warm. Neurological:      General: No focal deficit present. Mental Status: He is alert.            Scheduled Meds:   amLODIPine  5 mg Oral Daily    collagenase   Topical Daily    miconazole   Topical BID    aspirin  81 mg Oral Daily    tamsulosin  0.4 mg Oral Daily    sertraline  100 mg Oral Daily    metroNIDAZOLE  500 mg Oral TID    sodium chloride flush  5-40 mL Intravenous 2 times per day    heparin (porcine)  5,000 Units Subcutaneous 3 times per day    insulin glargine  0.15 Units/kg Subcutaneous Nightly    insulin lispro  0.05 Units/kg Subcutaneous TID WC    insulin lispro  0-6 Units Subcutaneous TID WC    insulin lispro  0-3 Units Subcutaneous Nightly    guaiFENesin  1,200 mg Oral BID    vancomycin (VANCOCIN) intermittent dosing (placeholder)   Other See Admin Instructions     ContinuousInfusions:   dextrose      sodium chloride       PRN Meds:technetium sestamibi, technetium sestamibi, ondansetron, oxyCODONE-acetaminophen, glucose, dextrose, glucagon (rDNA), dextrose, sodium chloride flush, sodium chloride, potassium chloride **OR** potassium alternative oral replacement **OR** potassium chloride, magnesium sulfate, promethazine **OR** ondansetron, polyethylene glycol, fleet, acetaminophen **OR** acetaminophen, zolpidem, guaiFENesin-dextromethorphan, benzonatate, calcium carbonate, morphine      Labs/Imaging Results:   Lab Results   Component Value Date    WBC 4.6 06/08/2021    HGB 8.0 (L) 06/08/2021    HCT 25.1 (L) 06/08/2021    MCV 89.6 06/08/2021     06/08/2021     Lab Results   Component Value Date     06/08/2021    K 5.0 06/08/2021     06/08/2021    CO2 19 (L) 06/08/2021    BUN 40 (H) 06/08/2021    CREATININE 2.1 (H) 06/08/2021    GLUCOSE 168 (H) 06/08/2021    CALCIUM 8.0 (L) 06/08/2021    PROT 5.4 (L) 06/08/2021    LABALBU 2.6 (L) 06/08/2021    BILITOT 0.2 06/08/2021    ALKPHOS 62 06/08/2021    AST 12 (L) 06/08/2021    ALT 8 (L) 06/08/2021    LABGLOM 34 (L) 06/08/2021    GFRAA 41 (L) 06/08/2021       Assessment:       54 y/o M with syncope, history of tolu gangrene s/p excisional debridement x two    Plan:       -D/w Wound Nurse. Continue daily dressing changes and santyl to sloughing areas. Wound vac had been tried in past; however, pt had difficulty maintaining seal while at home. Doubtful that having vac during this hospital stay would provide any significant benefit. Therefore continue with current regimen.     -Plan d/w pt. -Will continue to follow.       Electronically signed by Irineo Gregg II, MD on 6/8/2021 at 11:15 AM

## 2021-06-08 NOTE — PROGRESS NOTES
Cardiology Progress Note     Today's Plan plan cici scan    Admit Date:  6/5/2021    Consult reason/ Seen today for: sycope    Subjective and  Overnight Events:  Continues to note dizziness with standing- no chest pain or shortness of breath       Telemetry:  SR no arrhythmia or bradycardia noted     Assessment / Plan / Recommendation:     1. Syncope- + orthostatic blood pressures-- at home on flomax/hygroton/ lotrel - hold antihypertensives/ diuretic- recommend to keep hydrated - will check holter to assess for arrhythmia as well   2. Elevated troponin- ? Type 2 rise in the setting of anemia/CKD Cici scan done- mild lateral wall ischemia- optimize medical therapy - add bb- metoprolol- continue with ASA -start lipitor    3. H/o fornier gangrene- on ATB- continues with groin pain  4. HTN- presented with low bp and syncope-give IV fluid- antihypertensives placed on hold- bp improved -now running high -increase amlodipine-       History of Presenting Illness:    Chief complain on admission : 55 y. o.year old who is admitted for  Chief Complaint   Patient presents with    Wound Check     Open wound to scrotum.  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   Neck- trachea is midline  Eyes:  PEERL, Conjunctiva normal  Respiratory:  Normal breath sounds, No respiratory distress  Cardiovascular:  Normal heart rate, Normal rhythm, no murmurs appreciated, No rubs appreciated, No gallops appreciated, JVP not elevated  Abdomen/GI:  Large round non tender  Musculoskeletal:  Intact distal pulses, no edema to lower legs,  No tenderness, No cyanosis, No clubbing. Integument:  Warm, Dry-dressing to the right groin site- Lymphatic:  No lymphadenopathy noted.    Neurologic:  Alert & oriented  Psychiatric:  Affect and Mood :pleasant     Medications:    ondansetron  4 mg Intravenous Once    amLODIPine  5 mg Oral Daily    collagenase   Topical Daily    miconazole   Topical BID    aspirin  81 mg Oral Daily    tamsulosin  0.4 mg Oral Daily    sertraline  100 mg Oral Daily    metroNIDAZOLE  500 mg Oral TID    sodium chloride flush  5-40 mL Intravenous 2 times per day    heparin (porcine)  5,000 Units Subcutaneous 3 times per day    insulin glargine  0.15 Units/kg Subcutaneous Nightly    insulin lispro  0.05 Units/kg Subcutaneous TID WC    insulin lispro  0-6 Units Subcutaneous TID WC    insulin lispro  0-3 Units Subcutaneous Nightly    guaiFENesin  1,200 mg Oral BID    vancomycin (VANCOCIN) intermittent dosing (placeholder)   Other See Admin Instructions      dextrose      sodium chloride       ondansetron, oxyCODONE-acetaminophen, glucose, dextrose, glucagon (rDNA), dextrose, sodium chloride flush, sodium chloride, potassium chloride **OR** potassium alternative oral replacement **OR** potassium chloride, magnesium sulfate, promethazine **OR** ondansetron, polyethylene glycol, fleet, acetaminophen **OR** acetaminophen, zolpidem, guaiFENesin-dextromethorphan, benzonatate, calcium carbonate, morphine    Lab Data:  CBC:   Recent Labs     06/06/21  1240 06/07/21  0524 06/08/21  0445   WBC 4.7 5.0 4.6   HGB 8.9* 7.8* 8.0*   HCT 26.7* 24.3* 25.1*   MCV 89.6 90.7 89.6    278 318     BMP:   Recent Labs     06/06/21  1240 06/07/21  0524 06/08/21  0445   * 137 135   K 5.1 4.5 5.0    110 108   CO2 17* 18* 19*   PHOS  --  4.5  --    BUN 49* 44* 40*   CREATININE 2.2* 2.0* 2.1*     PT/INR: No results for input(s): PROTIME, INR in the last 72 hours. BNP:  No results for input(s): PROBNP in the last 72 hours. TROPONIN:   Recent Labs     06/05/21  1450 06/05/21  2156   TROPONINT 0.034* 0.022*               Impression:  Active Problems:    Syncope and collapse  Resolved Problems:    * No resolved hospital problems. *       All labs, medications and tests reviewed by myself, continue all other medications of all above medical condition listed as is except for changes mentioned above. Thank you   Please call with questions. Electronically signed by APPLE Armendariz CNP on 6/8/2021 at 2:03 PM  I have seen ,spoken to  and examined this patient personally, independently of the nurse practitioner. I have reviewed the hospital care given to date and reviewed all pertinent labs and imaging. The plan was developed mutually at the time of the visit with the patient,  NP  and myself. I have spoken with patient, nursing staff and provided written and verbal instructions . The above note has been reviewed and I agree with the assessment, diagnosis, and treatment plan with changes made by me as follows     CARDIOLOGY ATTENDING ADDENDUM    HPI:  I have reviewed the above HPI  And agree with above   Yana Guzman is a 55 y. o.year old who and presents with had concerns including Wound Check (Open wound to scrotum. Had a coughing fit at home and was incontinent and got stool in his wound). Chief Complaint   Patient presents with    Wound Check     Open wound to scrotum.  Had a coughing fit at home and was incontinent and got stool in his wound     Interval history:  HAD CARDIOLITE    Physical Exam:  General:  Awake, alert, NAD  Head:normal  Eye:normal  Neck:  No JVD   Chest:  Clear to auscultation, respiration easy  Cardiovascular:  RRR S1S2  Abdomen:   nontender  Extremities:  TR edema  Pulses; palpable  Neuro: grossly normal      MEDICAL DECISION MAKING;    I agree with the above plan, which was planned by myself and discussed with NP.  CARDIOLITE WITH MILD LAT WALL ISCHEMIA  MAXIMIZE MED THERAPY  WILL FU        Randolph Summres MD [facility]

## 2021-06-08 NOTE — PROGRESS NOTES
Attending Progress Note      PCP: Jigna Jameson MD      Patient: Angelica Rowe   Gender: male  : 1975   Age: 55 y.o. MRN: [id number]  Room  : [address] [place] [address]      Date of Admission: 2021    Chief Complaint   Patient presents with    Wound Check     Open wound to scrotum. Had a coughing fit at home and was incontinent and got stool in his wound           Subjective:  Pain at right groin .  No CP           Medications:  Reviewed  Infusion Medications    dextrose      sodium chloride       Scheduled Medications    ondansetron  4 mg Intravenous Once    [START ON 2021] amLODIPine  10 mg Oral Daily    metoprolol tartrate  25 mg Oral BID    atorvastatin  40 mg Oral Nightly    piperacillin-tazobactam  3,375 mg Intravenous Q8H    collagenase   Topical Daily    miconazole   Topical BID    aspirin  81 mg Oral Daily    tamsulosin  0.4 mg Oral Daily    sertraline  100 mg Oral Daily    sodium chloride flush  5-40 mL Intravenous 2 times per day    heparin (porcine)  5,000 Units Subcutaneous 3 times per day    insulin glargine  0.15 Units/kg Subcutaneous Nightly    insulin lispro  0.05 Units/kg Subcutaneous TID WC    insulin lispro  0-6 Units Subcutaneous TID WC    insulin lispro  0-3 Units Subcutaneous Nightly    guaiFENesin  1,200 mg Oral BID     PRN Meds: ondansetron, oxyCODONE-acetaminophen, glucose, dextrose, glucagon (rDNA), dextrose, sodium chloride flush, sodium chloride, potassium chloride **OR** potassium alternative oral replacement **OR** potassium chloride, magnesium sulfate, promethazine **OR** ondansetron, polyethylene glycol, fleet, acetaminophen **OR** acetaminophen, zolpidem, guaiFENesin-dextromethorphan, benzonatate, calcium carbonate, morphine      Intake/Output Summary (Last 24 hours) at 2021 1706  Last data filed at 2021 1206  Gross per 24 hour   Intake 10 ml   Output 1150 ml   Net -1140 ml       Exam:  BP (!) 148/91   Pulse 87   Temp 98 °F (36.7 °C) (Oral) MD

## 2021-06-08 NOTE — PROGRESS NOTES
Nephrology Progress Note  6/8/2021 9:00 AM        Subjective:   Admit Date: 6/5/2021  PCP: Jigna Jameson MD    Interval History: groin pain     Diet: some    ROS:  No sob    Data:     Current meds:    amLODIPine  5 mg Oral Daily    collagenase   Topical Daily    miconazole   Topical BID    aspirin  81 mg Oral Daily    tamsulosin  0.4 mg Oral Daily    sertraline  100 mg Oral Daily    metroNIDAZOLE  500 mg Oral TID    sodium chloride flush  5-40 mL Intravenous 2 times per day    heparin (porcine)  5,000 Units Subcutaneous 3 times per day    insulin glargine  0.15 Units/kg Subcutaneous Nightly    insulin lispro  0.05 Units/kg Subcutaneous TID WC    insulin lispro  0-6 Units Subcutaneous TID WC    insulin lispro  0-3 Units Subcutaneous Nightly    guaiFENesin  1,200 mg Oral BID    vancomycin (VANCOCIN) intermittent dosing (placeholder)   Other See Admin Instructions      dextrose      sodium chloride           I/O last 3 completed shifts: In: 10 [I.V.:10]  Out: 500 [Urine:500]    CBC:   Recent Labs     06/06/21  1240 06/07/21  0524 06/08/21  0445   WBC 4.7 5.0 4.6   HGB 8.9* 7.8* 8.0*    278 318          Recent Labs     06/06/21  1240 06/06/21  1402 06/07/21  0524 06/08/21  0445   *  --  137 135   K 5.1  --  4.5 5.0     --  110 108   CO2 17*  --  18* 19*   BUN 49*  --  44* 40*   CREATININE 2.2*  --  2.0* 2.1*   GLUCOSE 120* 118 123* 168*       Lab Results   Component Value Date    CALCIUM 8.0 (L) 06/08/2021    PHOS 4.5 06/07/2021       Objective:     Vitals: BP (!) 170/96   Pulse 82   Temp 98 °F (36.7 °C) (Oral)   Resp 16   Ht 5' 9\" (1.753 m)   Wt 270 lb (122.5 kg)   SpO2 96%   BMI 39.87 kg/m²     General appearance:  No ac distress  HEENT:  + conj pallor  Neck:  supple  Lungs:  No crackles  Heart:  RRR  Abdomen: soft  Extremities:  Mild edema   Groin wound seems better but tender and fluctuant       Problem List :         Impression :     1.  CARMEN- CKD stage 3 aa3-

## 2021-06-09 PROBLEM — S31.109A RIGHT GROIN WOUND: Status: ACTIVE | Noted: 2021-06-09

## 2021-06-09 LAB
ANION GAP SERPL CALCULATED.3IONS-SCNC: 10 MMOL/L (ref 4–16)
ANION GAP SERPL CALCULATED.3IONS-SCNC: 9 MMOL/L (ref 4–16)
BACTERIA: NEGATIVE /HPF
BILIRUBIN URINE: NEGATIVE MG/DL
BLOOD, URINE: ABNORMAL
BUN BLDV-MCNC: 36 MG/DL (ref 6–23)
BUN BLDV-MCNC: 42 MG/DL (ref 6–23)
CALCIUM SERPL-MCNC: 8.3 MG/DL (ref 8.3–10.6)
CALCIUM SERPL-MCNC: 8.3 MG/DL (ref 8.3–10.6)
CHLORIDE BLD-SCNC: 105 MMOL/L (ref 99–110)
CHLORIDE BLD-SCNC: 108 MMOL/L (ref 99–110)
CLARITY: CLEAR
CO2: 19 MMOL/L (ref 21–32)
CO2: 20 MMOL/L (ref 21–32)
COLOR: YELLOW
CREAT SERPL-MCNC: 2.3 MG/DL (ref 0.9–1.3)
CREAT SERPL-MCNC: 2.5 MG/DL (ref 0.9–1.3)
ERYTHROCYTE SEDIMENTATION RATE: >120 MM/HR (ref 0–15)
GFR AFRICAN AMERICAN: 34 ML/MIN/1.73M2
GFR AFRICAN AMERICAN: 37 ML/MIN/1.73M2
GFR NON-AFRICAN AMERICAN: 28 ML/MIN/1.73M2
GFR NON-AFRICAN AMERICAN: 31 ML/MIN/1.73M2
GLUCOSE BLD-MCNC: 147 MG/DL (ref 70–99)
GLUCOSE BLD-MCNC: 170 MG/DL (ref 70–99)
GLUCOSE BLD-MCNC: 170 MG/DL (ref 70–99)
GLUCOSE BLD-MCNC: 191 MG/DL (ref 70–99)
GLUCOSE BLD-MCNC: 197 MG/DL (ref 70–99)
GLUCOSE BLD-MCNC: 214 MG/DL (ref 70–99)
GLUCOSE BLD-MCNC: 223 MG/DL (ref 70–99)
GLUCOSE BLD-MCNC: 262 MG/DL (ref 70–99)
GLUCOSE, URINE: 50 MG/DL
HCT VFR BLD CALC: 26.7 % (ref 42–52)
HEMOGLOBIN: 8.8 GM/DL (ref 13.5–18)
HIGH SENSITIVE C-REACTIVE PROTEIN: 9.9 MG/L
KETONES, URINE: NEGATIVE MG/DL
LEUKOCYTE ESTERASE, URINE: NEGATIVE
MCH RBC QN AUTO: 29.4 PG (ref 27–31)
MCHC RBC AUTO-ENTMCNC: 33 % (ref 32–36)
MCV RBC AUTO: 89.3 FL (ref 78–100)
NITRITE URINE, QUANTITATIVE: NEGATIVE
PDW BLD-RTO: 14.1 % (ref 11.7–14.9)
PH, URINE: 5 (ref 5–8)
PLATELET # BLD: 330 K/CU MM (ref 140–440)
PMV BLD AUTO: 9.2 FL (ref 7.5–11.1)
POTASSIUM SERPL-SCNC: 4.9 MMOL/L (ref 3.5–5.1)
POTASSIUM SERPL-SCNC: 5.6 MMOL/L (ref 3.5–5.1)
PROCALCITONIN: 0.31
PROTEIN UA: >500 MG/DL
RBC # BLD: 2.99 M/CU MM (ref 4.6–6.2)
RBC URINE: <1 /HPF (ref 0–3)
SODIUM BLD-SCNC: 134 MMOL/L (ref 135–145)
SODIUM BLD-SCNC: 137 MMOL/L (ref 135–145)
SPECIFIC GRAVITY UA: 1.01 (ref 1–1.03)
TRICHOMONAS: ABNORMAL /HPF
UROBILINOGEN, URINE: NEGATIVE MG/DL (ref 0.2–1)
WBC # BLD: 5.5 K/CU MM (ref 4–10.5)
WBC UA: 1 /HPF (ref 0–2)

## 2021-06-09 PROCEDURE — 80048 BASIC METABOLIC PNL TOTAL CA: CPT

## 2021-06-09 PROCEDURE — 99232 SBSQ HOSP IP/OBS MODERATE 35: CPT | Performed by: INTERNAL MEDICINE

## 2021-06-09 PROCEDURE — 2500000003 HC RX 250 WO HCPCS: Performed by: FAMILY MEDICINE

## 2021-06-09 PROCEDURE — 6370000000 HC RX 637 (ALT 250 FOR IP): Performed by: NURSE PRACTITIONER

## 2021-06-09 PROCEDURE — 6370000000 HC RX 637 (ALT 250 FOR IP): Performed by: FAMILY MEDICINE

## 2021-06-09 PROCEDURE — 81001 URINALYSIS AUTO W/SCOPE: CPT

## 2021-06-09 PROCEDURE — 99232 SBSQ HOSP IP/OBS MODERATE 35: CPT | Performed by: SURGERY

## 2021-06-09 PROCEDURE — 94150 VITAL CAPACITY TEST: CPT

## 2021-06-09 PROCEDURE — 85027 COMPLETE CBC AUTOMATED: CPT

## 2021-06-09 PROCEDURE — 6360000002 HC RX W HCPCS: Performed by: FAMILY MEDICINE

## 2021-06-09 PROCEDURE — 2580000003 HC RX 258: Performed by: FAMILY MEDICINE

## 2021-06-09 PROCEDURE — 6360000002 HC RX W HCPCS: Performed by: INTERNAL MEDICINE

## 2021-06-09 PROCEDURE — 1200000000 HC SEMI PRIVATE

## 2021-06-09 PROCEDURE — 6370000000 HC RX 637 (ALT 250 FOR IP): Performed by: INTERNAL MEDICINE

## 2021-06-09 PROCEDURE — 85652 RBC SED RATE AUTOMATED: CPT

## 2021-06-09 PROCEDURE — 86141 C-REACTIVE PROTEIN HS: CPT

## 2021-06-09 PROCEDURE — APPSS45 APP SPLIT SHARED TIME 31-45 MINUTES: Performed by: NURSE PRACTITIONER

## 2021-06-09 PROCEDURE — 94761 N-INVAS EAR/PLS OXIMETRY MLT: CPT

## 2021-06-09 PROCEDURE — 84145 PROCALCITONIN (PCT): CPT

## 2021-06-09 PROCEDURE — 51798 US URINE CAPACITY MEASURE: CPT

## 2021-06-09 PROCEDURE — 2580000003 HC RX 258: Performed by: INTERNAL MEDICINE

## 2021-06-09 PROCEDURE — 82962 GLUCOSE BLOOD TEST: CPT

## 2021-06-09 RX ADMIN — SODIUM CHLORIDE, PRESERVATIVE FREE 10 ML: 5 INJECTION INTRAVENOUS at 12:37

## 2021-06-09 RX ADMIN — ASPIRIN 81 MG: 81 TABLET, COATED ORAL at 14:20

## 2021-06-09 RX ADMIN — COLLAGENASE SANTYL: 250 OINTMENT TOPICAL at 16:36

## 2021-06-09 RX ADMIN — INSULIN LISPRO 1 UNITS: 100 INJECTION, SOLUTION INTRAVENOUS; SUBCUTANEOUS at 08:01

## 2021-06-09 RX ADMIN — SODIUM ZIRCONIUM CYCLOSILICATE 10 G: 10 POWDER, FOR SUSPENSION ORAL at 16:36

## 2021-06-09 RX ADMIN — HEPARIN SODIUM 5000 UNITS: 5000 INJECTION INTRAVENOUS; SUBCUTANEOUS at 16:04

## 2021-06-09 RX ADMIN — OXYCODONE HYDROCHLORIDE AND ACETAMINOPHEN 1 TABLET: 5; 325 TABLET ORAL at 18:55

## 2021-06-09 RX ADMIN — HEPARIN SODIUM 5000 UNITS: 5000 INJECTION INTRAVENOUS; SUBCUTANEOUS at 05:10

## 2021-06-09 RX ADMIN — MICONAZOLE NITRATE: 2 POWDER TOPICAL at 16:37

## 2021-06-09 RX ADMIN — ONDANSETRON 4 MG: 4 TABLET, ORALLY DISINTEGRATING ORAL at 05:30

## 2021-06-09 RX ADMIN — ZOLPIDEM TARTRATE 5 MG: 5 TABLET ORAL at 22:22

## 2021-06-09 RX ADMIN — SERTRALINE HYDROCHLORIDE 100 MG: 100 TABLET ORAL at 14:21

## 2021-06-09 RX ADMIN — INSULIN LISPRO 1 UNITS: 100 INJECTION, SOLUTION INTRAVENOUS; SUBCUTANEOUS at 11:49

## 2021-06-09 RX ADMIN — METOPROLOL TARTRATE 25 MG: 25 TABLET, FILM COATED ORAL at 14:21

## 2021-06-09 RX ADMIN — GUAIFENESIN 1200 MG: 600 TABLET, EXTENDED RELEASE ORAL at 21:43

## 2021-06-09 RX ADMIN — OXYCODONE HYDROCHLORIDE AND ACETAMINOPHEN 1 TABLET: 5; 325 TABLET ORAL at 05:10

## 2021-06-09 RX ADMIN — Medication 2 MG: at 10:34

## 2021-06-09 RX ADMIN — ATORVASTATIN CALCIUM 40 MG: 40 TABLET, FILM COATED ORAL at 21:43

## 2021-06-09 RX ADMIN — SODIUM ZIRCONIUM CYCLOSILICATE 10 G: 10 POWDER, FOR SUSPENSION ORAL at 10:26

## 2021-06-09 RX ADMIN — INSULIN GLARGINE 18 UNITS: 100 INJECTION, SOLUTION SUBCUTANEOUS at 21:44

## 2021-06-09 RX ADMIN — METOPROLOL TARTRATE 25 MG: 25 TABLET, FILM COATED ORAL at 21:43

## 2021-06-09 RX ADMIN — INSULIN LISPRO 2 UNITS: 100 INJECTION, SOLUTION INTRAVENOUS; SUBCUTANEOUS at 17:08

## 2021-06-09 RX ADMIN — GUAIFENESIN 1200 MG: 600 TABLET, EXTENDED RELEASE ORAL at 14:21

## 2021-06-09 RX ADMIN — PIPERACILLIN AND TAZOBACTAM 3375 MG: 3; .375 INJECTION, POWDER, LYOPHILIZED, FOR SOLUTION INTRAVENOUS at 18:55

## 2021-06-09 RX ADMIN — PIPERACILLIN AND TAZOBACTAM 3375 MG: 3; .375 INJECTION, POWDER, LYOPHILIZED, FOR SOLUTION INTRAVENOUS at 02:08

## 2021-06-09 RX ADMIN — TAMSULOSIN HYDROCHLORIDE 0.4 MG: 0.4 CAPSULE ORAL at 14:21

## 2021-06-09 RX ADMIN — Medication 2 MG: at 16:05

## 2021-06-09 RX ADMIN — AMLODIPINE BESYLATE 10 MG: 10 TABLET ORAL at 14:19

## 2021-06-09 RX ADMIN — PIPERACILLIN AND TAZOBACTAM 3375 MG: 3; .375 INJECTION, POWDER, LYOPHILIZED, FOR SOLUTION INTRAVENOUS at 10:33

## 2021-06-09 RX ADMIN — HEPARIN SODIUM 5000 UNITS: 5000 INJECTION INTRAVENOUS; SUBCUTANEOUS at 21:43

## 2021-06-09 ASSESSMENT — PAIN DESCRIPTION - FREQUENCY
FREQUENCY: CONTINUOUS
FREQUENCY: CONTINUOUS

## 2021-06-09 ASSESSMENT — PAIN SCALES - GENERAL
PAINLEVEL_OUTOF10: 7
PAINLEVEL_OUTOF10: 0
PAINLEVEL_OUTOF10: 7
PAINLEVEL_OUTOF10: 0
PAINLEVEL_OUTOF10: 0
PAINLEVEL_OUTOF10: 7
PAINLEVEL_OUTOF10: 7
PAINLEVEL_OUTOF10: 0
PAINLEVEL_OUTOF10: 0

## 2021-06-09 ASSESSMENT — PAIN DESCRIPTION - ONSET
ONSET: ON-GOING
ONSET: ON-GOING

## 2021-06-09 ASSESSMENT — PAIN DESCRIPTION - LOCATION
LOCATION: SCROTUM
LOCATION: SCROTUM

## 2021-06-09 ASSESSMENT — PAIN DESCRIPTION - ORIENTATION
ORIENTATION: RIGHT;LEFT;ANTERIOR;MID;POSTERIOR
ORIENTATION: RIGHT;LEFT;ANTERIOR;MID;POSTERIOR

## 2021-06-09 ASSESSMENT — PAIN DESCRIPTION - PAIN TYPE
TYPE: CHRONIC PAIN
TYPE: CHRONIC PAIN

## 2021-06-09 ASSESSMENT — PAIN DESCRIPTION - DESCRIPTORS
DESCRIPTORS: THROBBING
DESCRIPTORS: THROBBING

## 2021-06-09 ASSESSMENT — PAIN DESCRIPTION - PROGRESSION
CLINICAL_PROGRESSION: NOT CHANGED
CLINICAL_PROGRESSION: NOT CHANGED

## 2021-06-09 NOTE — PLAN OF CARE
Problem: Falls - Risk of:  Goal: Will remain free from falls  Description: Will remain free from falls  Outcome: Ongoing     Problem: Falls - Risk of:  Goal: Absence of physical injury  Description: Absence of physical injury  Outcome: Ongoing     Problem: Pain:  Goal: Pain level will decrease  Description: Pain level will decrease  Outcome: Ongoing     Problem: Pain:  Goal: Control of acute pain  Description: Control of acute pain  Outcome: Ongoing     Problem: Pain:  Goal: Control of chronic pain  Description: Control of chronic pain  Outcome: Ongoing     Problem: Nutrition  Goal: Optimal nutrition therapy  Outcome: Ongoing

## 2021-06-09 NOTE — PROGRESS NOTES
Nephrology Progress Note  6/9/2021 2:58 PM        Subjective:   Admit Date: 6/5/2021  PCP: Stefani Arreguin MD    Interval History: seen in am     Diet: reasonable     ROS:  No sob , groin  Pain   No urinary sx     Data:     Current meds:    sodium zirconium cyclosilicate  10 g Oral TID    ondansetron  4 mg Intravenous Once    amLODIPine  10 mg Oral Daily    metoprolol tartrate  25 mg Oral BID    atorvastatin  40 mg Oral Nightly    piperacillin-tazobactam  3,375 mg Intravenous Q8H    collagenase   Topical Daily    miconazole   Topical BID    aspirin  81 mg Oral Daily    tamsulosin  0.4 mg Oral Daily    sertraline  100 mg Oral Daily    sodium chloride flush  5-40 mL Intravenous 2 times per day    heparin (porcine)  5,000 Units Subcutaneous 3 times per day    insulin glargine  0.15 Units/kg Subcutaneous Nightly    insulin lispro  0.05 Units/kg Subcutaneous TID WC    insulin lispro  0-6 Units Subcutaneous TID WC    insulin lispro  0-3 Units Subcutaneous Nightly    guaiFENesin  1,200 mg Oral BID      dextrose      sodium chloride           I/O last 3 completed shifts:   In: 480 [P.O.:480]  Out: 2000 [Urine:2000]    CBC:   Recent Labs     06/07/21  0524 06/08/21  0445 06/09/21  0530   WBC 5.0 4.6 5.5   HGB 7.8* 8.0* 8.8*    318 330          Recent Labs     06/07/21  0524 06/08/21  0445 06/09/21  0530    135 137   K 4.5 5.0 5.6*    108 108   CO2 18* 19* 19*   BUN 44* 40* 42*   CREATININE 2.0* 2.1* 2.5*   GLUCOSE 123* 168* 191*       Lab Results   Component Value Date    CALCIUM 8.3 06/09/2021    PHOS 4.5 06/07/2021       Objective:     Vitals: /84   Pulse 78   Temp 98.3 °F (36.8 °C) (Oral)   Resp 17   Ht 5' 9\" (1.753 m)   Wt 270 lb (122.5 kg)   SpO2 99%   BMI 39.87 kg/m²     General appearance:  No distress   HEENT:  No gross conj pallor   Neck:  supple  Lungs:  No gross crackles   Heart:  RRR  Abdomen: soft  Extremities:  Trace edema   Groin wound         Problem List : Impression :     1. CARMEN- CKD stage  3 a A3 - cr up again - d/d ac bladder obstruction / from infection- AIN - lab error etc - go step by step also off NS may increased cr by limiting dilution   2. DM/NS/ groin infection   3. Met acidosis from CARMEN  And HTN   4. High K _ d/d lab error vs sec to CARMEN     Recommendation/Plan  :     1. Start with bladder scan   2. K binders for now   3. Repeat UA   4. Avoid any nonessential med's   5. Will go step by step   6.  Redo labs in am       Jessee Dey MD MD

## 2021-06-09 NOTE — PROGRESS NOTES
Attending Progress Note      PCP: Leah Mccallum MD      Patient: Luisa Weaver   Gender: male  : 1975   Age: 55 y.o. MRN: [id number]  Room  : [address] [place] [address]      Date of Admission: 2021    Chief Complaint   Patient presents with    Wound Check     Open wound to scrotum. Had a coughing fit at home and was incontinent and got stool in his wound           Subjective:  Pain at right groin controlled . ...  No CP           Medications:  Reviewed  Infusion Medications    dextrose      sodium chloride       Scheduled Medications    sodium zirconium cyclosilicate  10 g Oral TID    ondansetron  4 mg Intravenous Once    amLODIPine  10 mg Oral Daily    metoprolol tartrate  25 mg Oral BID    atorvastatin  40 mg Oral Nightly    piperacillin-tazobactam  3,375 mg Intravenous Q8H    collagenase   Topical Daily    miconazole   Topical BID    aspirin  81 mg Oral Daily    tamsulosin  0.4 mg Oral Daily    sertraline  100 mg Oral Daily    sodium chloride flush  5-40 mL Intravenous 2 times per day    heparin (porcine)  5,000 Units Subcutaneous 3 times per day    insulin glargine  0.15 Units/kg Subcutaneous Nightly    insulin lispro  0.05 Units/kg Subcutaneous TID WC    insulin lispro  0-6 Units Subcutaneous TID WC    insulin lispro  0-3 Units Subcutaneous Nightly    guaiFENesin  1,200 mg Oral BID     PRN Meds: ondansetron, oxyCODONE-acetaminophen, glucose, dextrose, glucagon (rDNA), dextrose, sodium chloride flush, sodium chloride, potassium chloride **OR** potassium alternative oral replacement **OR** potassium chloride, magnesium sulfate, promethazine **OR** ondansetron, polyethylene glycol, fleet, acetaminophen **OR** acetaminophen, zolpidem, guaiFENesin-dextromethorphan, benzonatate, calcium carbonate, morphine      Intake/Output Summary (Last 24 hours) at 2021 1535  Last data filed at 2021 1421  Gross per 24 hour   Intake 480 ml   Output 1900 ml   Net -1420 ml       Exam:  /84 Pulse 78   Temp 98.3 °F (36.8 °C) (Oral)   Resp 17   Ht 5' 9\" (1.753 m)   Wt 270 lb (122.5 kg)   SpO2 99%   BMI 39.87 kg/m²   General appearance: No distress,   Respiratory:  good air entry , no Rales , No wheezing, or rhonchi,  Cardiovascular: RRR, with normal S1/S2 . Abdomen : Soft, non-tender, non-distended  , normal bowel sounds. Legs : No edema bilaterally. No DVT signs ,    Neurologic:  Alert and oriented ,        Labs:   Recent Labs     06/07/21  0524 06/08/21  0445 06/09/21  0530   WBC 5.0 4.6 5.5   HGB 7.8* 8.0* 8.8*   HCT 24.3* 25.1* 26.7*    318 330     Recent Labs     06/07/21  0524 06/08/21  0445 06/09/21  0530    135 137   K 4.5 5.0 5.6*    108 108   CO2 18* 19* 19*   BUN 44* 40* 42*   CREATININE 2.0* 2.1* 2.5*   CALCIUM 7.4* 8.0* 8.3   PHOS 4.5  --   --      Recent Labs     06/08/21  0445   AST 12*   ALT 8*   BILITOT 0.2   ALKPHOS 62     No results for input(s): INR in the last 72 hours. No results for input(s): Thomas Journey in the last 72 hours. Assessment/Plan:  Active Hospital Problems    Diagnosis Date Noted    Syncope and collapse [R55] 06/05/2021   DM II uncontrolled   ARF on CKD   Depression   Recent h/o Irene abscess s/p debridement on IV Vancomycin   Elevated troponin       Assessment/Plan:   Hopefully home tomorrow , on IV ABX . Wilmer Webster pt already has PICC line   Tele : no event - no fever - on room air   BP fluctuating   Off  IVF NACL. Wilmer Webster but   renal function worse today as creatinine 2.5 , nephro input noted   Cont IV ABX . Wilmer Webster ID consult . .. Zosyradha . Wilmer Webster monitor wound c/s . Wilmer Webster   Culture Abnormal  06/08/2021  4:01 PM [address] Moderate growth Sensitivity to follow    Culture Abnormal  06/08/2021  4:01 PM [address] [facility]   ENTEROBACTER CLOACAE COMPLEX Moderate growth Sensitivity to follow      ABD /groain CT : no abscess   surg consult /wound care input noted    stress test  : cardiolite mild lat wall ischemia ef 48%  And 2 D echo on 5/13/20221 :  Left ventricular systolic function is normal.   Ejection fraction is visually estimated at 55-60%. Mild left ventricular hypertrophy. Mildly dilated right ventricle. RVSP of 20 mmHG   Sclerotic, but non-stenotic aortic valve. No evidence of any pericardial effusion. Glucose control, insulin coverage   Pain control   Home meds , reviewed and resumed as appropriate   Symptoms releif/Pain control  DVT Prophylaxis   Diet: ADULT DIET; Regular; 5 carb choices (75 gm/meal)  Adult Oral Nutrition Supplement; Wound Healing Oral Supplement  Adult Oral Nutrition Supplement; Diabetic Oral Supplement  Code Status: Full Code          Treatment progress and plan was d/w pt/family .     Geraldine Naik MD

## 2021-06-09 NOTE — PROGRESS NOTES
General Surgery-Dr. Astrid Chung Day: 5    Chief Complaint on Admission: syncope      Subjective:     Had stress test done yesterday. States the medicine he was given made him feel sick. Feels better now. Went for CT A/P. States he only has pain at one part of his groin (upper right side) and otherwise without issues. Denies F/C. Was seen by ID.     ROS:  Review of Systems   Constitutional: Negative for chills and fever. HENT: Negative. Eyes: Negative. Respiratory: Negative. Gastrointestinal: Negative. Endocrine: Negative. Genitourinary: Negative for difficulty urinating, discharge and scrotal swelling. Musculoskeletal: Negative. Skin: Positive for wound. Allergic/Immunologic: Negative. Neurological: Positive for syncope. Hematological: Negative. Psychiatric/Behavioral: Negative. Allergies  Adhesive tape, Doxycycline, and Reglan [metoclopramide]          Diagnosis Date    Abscess 2010    scrotal    Acute renal failure (ARF) ([address] 8/4/2019    Anxiety associated with depression     Anxiety associated with depression     Back pain 7/2/2012    Chest pain 5/1/2013    Diabetic nephropathy ([address]     Diabetic neuropathy ([address] 12/22/2011    Diabetic ulcer of left midfoot associated with type 2 diabetes mellitus, with fat layer exposed ([address] 7/18/2017    Diverticulosis     C scope + Dr. Keanu Thomas DM (diabetes mellitus), type 2 ([address] 2002    DR. 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 Keanu Thomas Necrotizing fasciitis [facility])     Nose fracture 1988    Panic attacks     Pericarditis 2003    Hospitalized with s/p heart cath normal    Peripheral autonomic neuropathy due to diabetes mellitus ([address]     Axonal EMG- NCS, March 2011    Sebaceous cyst 9/1/2011    URI (upper respiratory infection) 2/27/2012    WD-Wound, surgical, infected, initial encounter 12/8/2017  Wrist fracture        Objective:     Vitals:    21   BP: (!) 186/90   Pulse: 97   Resp: 18   Temp: 98.3 °F (36.8 °C)   SpO2: 98%       TEMPERATURE:  Current -Temp: 98.3 °F (36.8 °C); Max - Temp  Av.2 °F (36.8 °C)  Min: 98 °F (36.7 °C)  Max: 98.3 °F (36.8 °C)    I/O this shift:  In: -   Out: 450 [Urine:450]I/O last 3 completed shifts: In: 480 [P.O.:480]  Out: 1550 [Urine:1550]      Physical Exam:  Physical Exam  Constitutional:       General: He is not in acute distress. Appearance: He is not ill-appearing, toxic-appearing or diaphoretic. HENT:      Head: Normocephalic and atraumatic. Right Ear: External ear normal.      Left Ear: External ear normal.      Nose: Nose normal.   Eyes:      General:         Right eye: No discharge. Left eye: No discharge. Extraocular Movements: Extraocular movements intact. Cardiovascular:      Rate and Rhythm: Normal rate. Pulmonary:      Effort: Pulmonary effort is normal.   Abdominal:      Palpations: Abdomen is soft. Tenderness: There is no abdominal tenderness. Genitourinary:     Comments: Groin wound with packing in place, beefy red base without drainage or odor. Some minimal yellow tissue sloughing present. Musculoskeletal:         General: No swelling. Cervical back: Normal range of motion. Skin:     General: Skin is warm. Neurological:      General: No focal deficit present. Mental Status: He is alert.            Scheduled Meds:   ondansetron  4 mg Intravenous Once    amLODIPine  10 mg Oral Daily    metoprolol tartrate  25 mg Oral BID    atorvastatin  40 mg Oral Nightly    piperacillin-tazobactam  3,375 mg Intravenous Q8H    collagenase   Topical Daily    miconazole   Topical BID    aspirin  81 mg Oral Daily    tamsulosin  0.4 mg Oral Daily    sertraline  100 mg Oral Daily    sodium chloride flush  5-40 mL Intravenous 2 times per day    heparin (porcine)  5,000 Units Subcutaneous 3 times per day    insulin glargine  0.15 Units/kg Subcutaneous Nightly    insulin lispro  0.05 Units/kg Subcutaneous TID WC    insulin lispro  0-6 Units Subcutaneous TID WC    insulin lispro  0-3 Units Subcutaneous Nightly    guaiFENesin  1,200 mg Oral BID     ContinuousInfusions:   dextrose      sodium chloride       PRN Meds:ondansetron, oxyCODONE-acetaminophen, glucose, dextrose, glucagon (rDNA), dextrose, sodium chloride flush, sodium chloride, potassium chloride **OR** potassium alternative oral replacement **OR** potassium chloride, magnesium sulfate, promethazine **OR** ondansetron, polyethylene glycol, fleet, acetaminophen **OR** acetaminophen, zolpidem, guaiFENesin-dextromethorphan, benzonatate, calcium carbonate, morphine      Labs/Imaging Results:   Lab Results   Component Value Date    WBC 4.6 06/08/2021    HGB 8.0 (L) 06/08/2021    HCT 25.1 (L) 06/08/2021    MCV 89.6 06/08/2021     06/08/2021     Lab Results   Component Value Date     06/08/2021    K 5.0 06/08/2021     06/08/2021    CO2 19 (L) 06/08/2021    BUN 40 (H) 06/08/2021    CREATININE 2.1 (H) 06/08/2021    GLUCOSE 168 (H) 06/08/2021    CALCIUM 8.0 (L) 06/08/2021    PROT 5.4 (L) 06/08/2021    LABALBU 2.6 (L) 06/08/2021    BILITOT 0.2 06/08/2021    ALKPHOS 62 06/08/2021    AST 12 (L) 06/08/2021    ALT 8 (L) 06/08/2021    LABGLOM 34 (L) 06/08/2021    GFRAA 41 (L) 06/08/2021     CT A/P:  Redemonstration of debridement involving the rightward aspect of the scrotum   and the perineum.       The amount of fat stranding adjacent to the wound appears increased.    However, the gas and fluid collection seen previously within the right   scrotum is no longer detected.  The bubbles of gas seen previously tracking   from the wound also no longer detected.       No gas/fluid collections are identified on the current examination to suggest   abscess formation.       Scrotal skin thickening, especially the right, increased when compared to the previous exam.       Small left hydrocele, decreased when compared to the previous exam.         Assessment:       56 y/o M with syncope, history of tolu gangrene s/p excisional debridement x two    Plan:       -D/w Wound Nurse. Continue daily dressing changes and santyl to sloughing areas. Wound vac had been tried in past; however, pt had difficulty maintaining seal while at home. Doubtful that having vac during this hospital stay would provide any significant benefit. Therefore continue with current regimen. -ID consult reviewed. Plans noted for rule out pseudomonal and fungal infection.     -Plan d/w pt and his nurse at bedside.  Will continue to see while pt is in hospital.       Electronically signed by Darrol Apley, II, MD on 6/9/2021 at 5:36 AM

## 2021-06-09 NOTE — PROGRESS NOTES
Physician Progress Note      Carmen Lozano  [facility] #:                  [id number]  :                       1975  ADMIT DATE:       2021 4:20 PM  [address] [place] DATE:  RESPONDING  PROVIDER #:        Sandra Arevalo MD          QUERY TEXT:    Patient admitted with Syncope. noted to have orthostatic hypotension   documented. If possible, please document in progress notes and discharge   summary if you are evaluating and/or treating any of the following: The medical record reflects the following:  Risk Factors: Diabetic neuropathy, CKD, CARMEN, orthostatic hypotension  Clinical Indicators:\"Syncope- + orthostatic blood pressures-- at home on   flomax/hygroton/ lotrel - hold antihypertensives/ diuretic- recommend to keep   hydrated - will check holter to assess for arrhythmia as well  Treatment:  holding  antihypertensives/ diuretic- recommend to keep hydrated -   will check holter to assess for arrhythmia ,Stress test, Labs, Vitals    Thank you Pinky Colón RN, CDS ([phone])  Options provided:  -- Syncope due to orthostatic hypotension secondary to diabetic autonomic   neuropathy  -- Syncope due to other hypotension  -- Carotid sinus syncope  -- Other - I will add my own diagnosis  -- Disagree - Not applicable / Not valid  -- Disagree - Clinically unable to determine / Unknown  -- Refer to Clinical Documentation Reviewer    PROVIDER RESPONSE TEXT:    The syncope is due to orthostatic hypotension secondary to diabetic autonomic   neuropathy.     Query created by: Dayton Marinelli on 2021 3:19 PM      Electronically signed by:  Sandra Arevalo MD 2021 9:10 AM

## 2021-06-09 NOTE — PROGRESS NOTES
Infectious Disease Progress Note  2021   Patient Name: Stephenie Lira : 1975       Reason for visit: F/u right groin wound with cellulitis (status post debridement for necrotizing fasciitis)  History:? Interval history noted  Denies n/v/d/f or untoward effects of antimicrobials    Physical Exam:  Vital Signs: /84   Pulse 78   Temp 98.3 °F (36.8 °C) (Oral)   Resp 17   Ht 5' 9\" (1.753 m)   Wt 270 lb (122.5 kg)   SpO2 99%   BMI 39.87 kg/m²     Gen: alert and oriented X3, no distress  Skin: no stigmata of endocarditis  Wounds: right groin ulcer with surrounding erythema and slough at the base. HEMT: AT/NC Oropharynx pink, moist, and without lesions or exudates; dentition in good state of repair  Eyes: PERRLA, EOMI, conjunctiva pink, sclera anicteric. Neck: Supple. Trachea midline. No LAD. Chest: no distress and CTA. Good air movement. Heart: RRR and no MRG. Abd: soft, non-distended, no tenderness, no hepatomegaly. Normoactive bowel sounds. Ext: no clubbing, cyanosis, or edema  Catheter Site: without erythema or tenderness  LDA: left arm midline. Neuro: Mental status intact. CN 2-12 intact and no focal sensory or motor deficits     Radiologic / Imaging / TESTING  CT of the abdomen and pelvis  Redemonstration of debridement involving the rightward aspect of the scrotum and the perineum. The amount of fat stranding adjacent to the wound appears increased. However, the gas and fluid collection seen previously within the right scrotum is no longer detected. The bubbles of gas seen previously tracking from the wound also no longer detected. No gas/fluid collections are identified on the current examination to suggest abscess formation.   Scrotal skin thickening, especially the right, increased when compared to the previous exam.  Small left hydrocele, decreased when compared to the previous exam.        Labs:    Recent Results (from the past 24 hour(s))   POCT Glucose    Collection Time: 06/08/21  9:10 PM   Result Value Ref Range    POC Glucose 189 (H) 70 - 99 MG/DL   Basic Metabolic Panel w/ Reflex to MG    Collection Time: 06/09/21  5:30 AM   Result Value Ref Range    Sodium 137 135 - 145 MMOL/L    Potassium 5.6 (HH) 3.5 - 5.1 MMOL/L    Chloride 108 99 - 110 mMol/L    CO2 19 (L) 21 - 32 MMOL/L    Anion Gap 10 4 - 16    BUN 42 (H) 6 - 23 MG/DL    CREATININE 2.5 (H) 0.9 - 1.3 MG/DL    Glucose 191 (H) 70 - 99 MG/DL    Calcium 8.3 8.3 - 10.6 MG/DL    GFR Non-African American 28 (L) >60 mL/min/1.73m2    GFR  34 (L) >60 mL/min/1.73m2   CBC    Collection Time: 06/09/21  5:30 AM   Result Value Ref Range    WBC 5.5 4.0 - 10.5 K/CU MM    RBC 2.99 (L) 4.6 - 6.2 M/CU MM    Hemoglobin 8.8 (L) 13.5 - 18.0 GM/DL    Hematocrit 26.7 (L) 42 - 52 %    MCV 89.3 78 - 100 FL    MCH 29.4 27 - 31 PG    MCHC 33.0 32.0 - 36.0 %    RDW 14.1 11.7 - 14.9 %    Platelets 996 239 - 267 K/CU MM    MPV 9.2 7.5 - 11.1 FL   C-Reactive Protein    Collection Time: 06/09/21  5:30 AM   Result Value Ref Range    CRP, High Sensitivity 9.9 mg/L   Sedimentation Rate    Collection Time: 06/09/21  5:30 AM   Result Value Ref Range    Sed Rate >120 (H) 0 - 15 MM/HR   Procalcitonin    Collection Time: 06/09/21  5:30 AM   Result Value Ref Range    Procalcitonin 0.314    POCT Glucose    Collection Time: 06/09/21  6:51 AM   Result Value Ref Range    POC Glucose 197 (H) 70 - 99 MG/DL   POCT Glucose    Collection Time: 06/09/21 11:03 AM   Result Value Ref Range    POC Glucose 170 (H) 70 - 99 MG/DL   Urinalysis    Collection Time: 06/09/21 11:19 AM   Result Value Ref Range    Color, UA YELLOW YELLOW    Clarity, UA CLEAR CLEAR    Glucose, Urine 50 (A) NEGATIVE MG/DL    Bilirubin Urine NEGATIVE NEGATIVE MG/DL    Ketones, Urine NEGATIVE NEGATIVE MG/DL    Specific Gravity, UA 1.014 1.001 - 1.035    Blood, Urine SMALL (A) NEGATIVE    pH, Urine 5.0 5.0 - 8.0    Protein, UA >500 (HH) NEGATIVE MG/DL    Urobilinogen, Urine NEGATIVE 0.2 - 1.0 MG/DL    Nitrite Urine, Quantitative NEGATIVE NEGATIVE    Leukocyte Esterase, Urine NEGATIVE NEGATIVE    RBC, UA <1 0 - 3 /HPF    WBC, UA 1 0 - 2 /HPF    Bacteria, UA NEGATIVE NEGATIVE /HPF    Trichomonas, UA NONE SEEN NONE SEEN /HPF   POCT Glucose    Collection Time: 06/09/21 11:48 AM   Result Value Ref Range    POC Glucose 170 (H) 70 - 99 MG/DL   Basic metabolic panel    Collection Time: 06/09/21  3:30 PM   Result Value Ref Range    Sodium 134 (L) 135 - 145 MMOL/L    Potassium 4.9 3.5 - 5.1 MMOL/L    Chloride 105 99 - 110 mMol/L    CO2 20 (L) 21 - 32 MMOL/L    Anion Gap 9 4 - 16    BUN 36 (H) 6 - 23 MG/DL    CREATININE 2.3 (H) 0.9 - 1.3 MG/DL    Glucose 147 (H) 70 - 99 MG/DL    Calcium 8.3 8.3 - 10.6 MG/DL    GFR Non- 31 (L) >60 mL/min/1.73m2    GFR  37 (L) >60 mL/min/1.73m2   POCT Glucose    Collection Time: 06/09/21  3:58 PM   Result Value Ref Range    POC Glucose 223 (H) 70 - 99 MG/DL     CULTURE results: Invalid input(s): BLOOD CULTURE,  URINE CULTURE, SURGICAL CULTURE    Diagnosis:  Patient Active Problem List   Diagnosis    Hiatal hernia    Diabetes mellitus type 2, improved controlled    Diabetic neuropathy (HCC)    Panic attack    Anxiety associated with depression    Neck pain    DANIELA (obstructive sleep apnea)    Urinary frequency    Bilateral carpal tunnel syndrome- left worse than right    Hyperlipidemia with target LDL less than 100    Essential hypertension    Bronchitis    Osteomyelitis ([address]    Diabetic ulcer of left midfoot (HCC)    WD-Diabetic ulcer of left midfoot associated with type 2 diabetes mellitus, with fat layer exposed ([address]    Abscess    Periumbilical hernia    WD-Wound, surgical, infected, initial encounter    Sepsis ([address]    Cellulitis of left foot    Constipation    Intractable nausea and vomiting    Uncontrolled type 2 diabetes mellitus (HCC)    Nausea and vomiting    Diabetic foot infection ([address]    Acute renal failure (ARF) ([address]    Osteomyelitis of fourth toe of right foot ([address]    Cellulitis    Cellulitis of left arm    Cellulitis, scrotum    Acute epididymitis    Irene gangrene    Recurrent major depressive disorder, in full remission ([address]    Generalized anxiety disorder    Morbid obesity with body mass index (BMI) of 40.0 to 44.9 in adult [facility])    Necrotizing fasciitis ([address]    Syncope and collapse    Right groin wound    Ulcer of right groin with fat layer exposed ([address]       Active Problems  Active Problems:    Syncope and collapse    Right groin wound    Ulcer of right groin with fat layer exposed ([address]  Resolved Problems:    * No resolved hospital problems. *      Impression and plan   Summary and rationale: Patient is a 55 y.o.  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 Treated with vancomycin and metronidazole. Readmitted for syncope. Has right groin wound with cellulitis.     Clinical status: Improving   Therapeutic: Zosyn   Diagnostic: trend CRP   F/u: wound cx   Other:        Electronically signed by: Electronically signed by Mariana You MD on 6/9/2021 at 5:59 PM

## 2021-06-09 NOTE — PROGRESS NOTES
Cardiology Progress Note     Today's Plan: will sign off and be available if needed     Admit Date:  6/5/2021    Consult reason/ Seen today for: sycope    Subjective and  Overnight Events:  C/o feeling tired today - denies chest pain or shortness of breath      Telemetry:  SR no arrhythmia or bradycardia noted     Assessment / Plan / Recommendation:     1. Syncope- + orthostatic blood pressures-- at home on flomax/hygroton/ lotrel - hold antihypertensives/ diuretic- recommend to keep hydrated - will check holter to assess for arrhythmia as well   2. Elevated troponin- ? Type 2 rise in the setting of anemia/CKD Cici scan done- mild lateral wall ischemia- optimize medical therapy - added bb- metoprolol- continue with ASA and lipitor    3. H/o fornier gangrene- on ATB- continues with groin pain  4. HTN- presented with low bp and syncope-give IV fluid- antihypertensives placed on hold- bp improved -now running high -increase amlodipine-       History of Presenting Illness:    Chief complain on admission : 55 y. o.year old who is admitted for  Chief Complaint   Patient presents with    Wound Check     Open wound to scrotum.  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 Past surgical history:   has a past surgical history that includes Cardiac catheterization (2003, 2013); Tonsillectomy and adenoidectomy (1988); Upper gastrointestinal endoscopy (06/2/2011); Colonoscopy (6/2/2011); Nose surgery (1993); skin biopsy (N/A, [id number]); other surgical history (Right, 05/22/2015); Toe amputation; Abdomen surgery; other surgical history (12/04/2017); pr deep incis foot bone infectn (Left, 9/22/2018); Upper gastrointestinal endoscopy (N/A, 1/12/2019); Toe amputation (Right, 3/23/2019); Toe amputation (Right, 8/6/2019); Scrotal surgery (N/A, 5/15/2021); and Scrotal surgery (N/A, 5/16/2021). Social History:   reports that he quit smoking about 3 years ago. He quit after 20.00 years of use. He has never used smokeless tobacco. He reports current alcohol use. He reports current drug use. Frequency: 7.00 times per week. Drug: Marijuana. Family history:  family history includes ADHD in his daughter; Bleeding Prob in his mother; Cancer in his mother; Diabetes in his father and sister; Heart Disease in his father; High Blood Pressure in his father, sister, and another family member; Kidney Disease in his father; Mental Illness in his sister and another family member; Obesity in his father and sister; Other in his son; Stroke in his mother.     Allergies   Allergen Reactions    Adhesive Tape Rash    Doxycycline Nausea And Vomiting    Reglan [Metoclopramide] Anxiety       Review of Systems:   All 14 systems were reviewed and are negative  Except for the positive findings which are documented     BP (!) 140/93   Pulse 73   Temp 98.3 °F (36.8 °C) (Oral)   Resp 16   Ht 5' 9\" (1.753 m)   Wt 270 lb (122.5 kg)   SpO2 97%   BMI 39.87 kg/m²       Intake/Output Summary (Last 24 hours) at 6/9/2021 1313  Last data filed at 6/9/2021 0302  Gross per 24 hour   Intake 480 ml   Output 1350 ml Net -870 ml       Physical Exam:  Constitutional:  Well developed,  No acute distress  HENT:  Normocephalic, Atraumatic, Bilateral external ears normal,  Nose normal.   Neck- trachea is midline  Eyes:  PEERL, Conjunctiva normal  Respiratory:  Normal breath sounds, No respiratory distress  Cardiovascular:  Normal heart rate, Normal rhythm, no murmurs appreciated, No rubs appreciated, No gallops appreciated, JVP not elevated  Abdomen/GI:  Large round non tender  Musculoskeletal:  Intact distal pulses, no edema to lower legs,  No tenderness, No cyanosis, No clubbing. Integument:  Warm, Dry-dressing to the right groin site-   Lymphatic:  No lymphadenopathy noted.    Neurologic:  Alert & oriented  Psychiatric:  Affect and Mood :pleasant     Medications:    sodium zirconium cyclosilicate  10 g Oral TID    ondansetron  4 mg Intravenous Once    amLODIPine  10 mg Oral Daily    metoprolol tartrate  25 mg Oral BID    atorvastatin  40 mg Oral Nightly    piperacillin-tazobactam  3,375 mg Intravenous Q8H    collagenase   Topical Daily    miconazole   Topical BID    aspirin  81 mg Oral Daily    tamsulosin  0.4 mg Oral Daily    sertraline  100 mg Oral Daily    sodium chloride flush  5-40 mL Intravenous 2 times per day    heparin (porcine)  5,000 Units Subcutaneous 3 times per day    insulin glargine  0.15 Units/kg Subcutaneous Nightly    insulin lispro  0.05 Units/kg Subcutaneous TID WC    insulin lispro  0-6 Units Subcutaneous TID WC    insulin lispro  0-3 Units Subcutaneous Nightly    guaiFENesin  1,200 mg Oral BID      dextrose      sodium chloride       ondansetron, oxyCODONE-acetaminophen, glucose, dextrose, glucagon (rDNA), dextrose, sodium chloride flush, sodium chloride, potassium chloride **OR** potassium alternative oral replacement **OR** potassium chloride, magnesium sulfate, promethazine **OR** ondansetron, polyethylene glycol, fleet, acetaminophen **OR** acetaminophen, zolpidem, guaiFENesin-dextromethorphan, benzonatate, calcium carbonate, morphine    Lab Data:  CBC:   Recent Labs     06/07/21  0524 06/08/21 0445 06/09/21  0530   WBC 5.0 4.6 5.5   HGB 7.8* 8.0* 8.8*   HCT 24.3* 25.1* 26.7*   MCV 90.7 89.6 89.3    318 330     BMP:   Recent Labs     06/07/21  0524 06/08/21  0445 06/09/21  0530    135 137   K 4.5 5.0 5.6*    108 108   CO2 18* 19* 19*   PHOS 4.5  --   --    BUN 44* 40* 42*   CREATININE 2.0* 2.1* 2.5*     PT/INR: No results for input(s): PROTIME, INR in the last 72 hours. BNP:  No results for input(s): PROBNP in the last 72 hours. TROPONIN:   No results for input(s): TROPONINT in the last 72 hours. Impression:  Active Problems:    Syncope and collapse  Resolved Problems:    * No resolved hospital problems. *       All labs, medications and tests reviewed by myself, continue all other medications of all above medical condition listed as is except for changes mentioned above. Thank you   Please call with questions. Electronically signed by APPLE Sánchez CNP on 6/9/2021 at 1:13 PM  I have seen ,spoken to  and examined this patient personally, independently of the nurse practitioner. I have reviewed the hospital care given to date and reviewed all pertinent labs and imaging. The plan was developed mutually at the time of the visit with the patient,  NP  and myself. I have spoken with patient, nursing staff and provided written and verbal instructions . The above note has been reviewed and I agree with the assessment, diagnosis, and treatment plan with changes made by me as follows     CARDIOLOGY ATTENDING ADDENDUM    HPI:  I have reviewed the above HPI  And agree with above   Reji Pablo is a 55 y. o.year old who and presents with had concerns including Wound Check (Open wound to scrotum. Had a coughing fit at home and was incontinent and got stool in his wound).   Chief Complaint   Patient presents with    Wound Check     Open wound to scrotum.  Had a coughing fit at home and was incontinent and got stool in his wound     Interval history:  No cp    Physical Exam:  General:  Awake, alert, NAD  Head:normal  Eye:normal  Neck:  No JVD   Chest:  Clear to auscultation, respiration easy  Cardiovascular:  RRR S1S2  Abdomen:   nontender  Extremities:  tr edema  Pulses; palpable  Neuro: grossly normal      MEDICAL DECISION MAKING;    I agree with the above plan, which was planned by myself and discussed with NP.  [facility] once aseptic        Altagracia Harvey MD [facility]

## 2021-06-10 LAB
ALBUMIN SERPL-MCNC: 2.5 GM/DL (ref 3.4–5)
ALP BLD-CCNC: 60 IU/L (ref 40–128)
ALT SERPL-CCNC: 7 U/L (ref 10–40)
ANION GAP SERPL CALCULATED.3IONS-SCNC: 11 MMOL/L (ref 4–16)
AST SERPL-CCNC: 9 IU/L (ref 15–37)
BILIRUB SERPL-MCNC: 0.2 MG/DL (ref 0–1)
BUN BLDV-MCNC: 42 MG/DL (ref 6–23)
CALCIUM SERPL-MCNC: 8.6 MG/DL (ref 8.3–10.6)
CHLORIDE BLD-SCNC: 107 MMOL/L (ref 99–110)
CO2: 19 MMOL/L (ref 21–32)
CREAT SERPL-MCNC: 2.3 MG/DL (ref 0.9–1.3)
GFR AFRICAN AMERICAN: 37 ML/MIN/1.73M2
GFR NON-AFRICAN AMERICAN: 31 ML/MIN/1.73M2
GLUCOSE BLD-MCNC: 178 MG/DL (ref 70–99)
GLUCOSE BLD-MCNC: 186 MG/DL (ref 70–99)
GLUCOSE BLD-MCNC: 197 MG/DL (ref 70–99)
GLUCOSE BLD-MCNC: 206 MG/DL (ref 70–99)
GLUCOSE BLD-MCNC: 250 MG/DL (ref 70–99)
HCT VFR BLD CALC: 26.6 % (ref 42–52)
HEMOGLOBIN: 8.4 GM/DL (ref 13.5–18)
HIGH SENSITIVE C-REACTIVE PROTEIN: 7.4 MG/L
MAGNESIUM: 2.4 MG/DL (ref 1.8–2.4)
MCH RBC QN AUTO: 28.1 PG (ref 27–31)
MCHC RBC AUTO-ENTMCNC: 31.6 % (ref 32–36)
MCV RBC AUTO: 89 FL (ref 78–100)
PDW BLD-RTO: 14.1 % (ref 11.7–14.9)
PHOSPHORUS: 4.6 MG/DL (ref 2.5–4.9)
PLATELET # BLD: 330 K/CU MM (ref 140–440)
PMV BLD AUTO: 9.9 FL (ref 7.5–11.1)
POTASSIUM SERPL-SCNC: 5 MMOL/L (ref 3.5–5.1)
RBC # BLD: 2.99 M/CU MM (ref 4.6–6.2)
SODIUM BLD-SCNC: 137 MMOL/L (ref 135–145)
TOTAL PROTEIN: 6.3 GM/DL (ref 6.4–8.2)
WBC # BLD: 6.2 K/CU MM (ref 4–10.5)

## 2021-06-10 PROCEDURE — 1200000000 HC SEMI PRIVATE

## 2021-06-10 PROCEDURE — 84100 ASSAY OF PHOSPHORUS: CPT

## 2021-06-10 PROCEDURE — 99232 SBSQ HOSP IP/OBS MODERATE 35: CPT | Performed by: INTERNAL MEDICINE

## 2021-06-10 PROCEDURE — 85027 COMPLETE CBC AUTOMATED: CPT

## 2021-06-10 PROCEDURE — 80053 COMPREHEN METABOLIC PANEL: CPT

## 2021-06-10 PROCEDURE — 2500000003 HC RX 250 WO HCPCS: Performed by: FAMILY MEDICINE

## 2021-06-10 PROCEDURE — 83735 ASSAY OF MAGNESIUM: CPT

## 2021-06-10 PROCEDURE — 6370000000 HC RX 637 (ALT 250 FOR IP): Performed by: FAMILY MEDICINE

## 2021-06-10 PROCEDURE — 6360000002 HC RX W HCPCS: Performed by: FAMILY MEDICINE

## 2021-06-10 PROCEDURE — 36415 COLL VENOUS BLD VENIPUNCTURE: CPT

## 2021-06-10 PROCEDURE — 6370000000 HC RX 637 (ALT 250 FOR IP): Performed by: NURSE PRACTITIONER

## 2021-06-10 PROCEDURE — 6370000000 HC RX 637 (ALT 250 FOR IP): Performed by: INTERNAL MEDICINE

## 2021-06-10 PROCEDURE — 6360000002 HC RX W HCPCS: Performed by: INTERNAL MEDICINE

## 2021-06-10 PROCEDURE — 2580000003 HC RX 258: Performed by: FAMILY MEDICINE

## 2021-06-10 PROCEDURE — 93005 ELECTROCARDIOGRAM TRACING: CPT | Performed by: INTERNAL MEDICINE

## 2021-06-10 PROCEDURE — 82962 GLUCOSE BLOOD TEST: CPT

## 2021-06-10 PROCEDURE — 99233 SBSQ HOSP IP/OBS HIGH 50: CPT | Performed by: SURGERY

## 2021-06-10 PROCEDURE — 86141 C-REACTIVE PROTEIN HS: CPT

## 2021-06-10 PROCEDURE — 2580000003 HC RX 258: Performed by: INTERNAL MEDICINE

## 2021-06-10 RX ORDER — FLUCONAZOLE 100 MG/1
200 TABLET ORAL WEEKLY
Status: DISCONTINUED | OUTPATIENT
Start: 2021-06-10 | End: 2021-06-15 | Stop reason: HOSPADM

## 2021-06-10 RX ADMIN — MICONAZOLE NITRATE: 2 POWDER TOPICAL at 10:09

## 2021-06-10 RX ADMIN — HEPARIN SODIUM 5000 UNITS: 5000 INJECTION INTRAVENOUS; SUBCUTANEOUS at 05:58

## 2021-06-10 RX ADMIN — FLUCONAZOLE 200 MG: 100 TABLET ORAL at 17:24

## 2021-06-10 RX ADMIN — OXYCODONE HYDROCHLORIDE AND ACETAMINOPHEN 1 TABLET: 5; 325 TABLET ORAL at 12:18

## 2021-06-10 RX ADMIN — INSULIN GLARGINE 18 UNITS: 100 INJECTION, SOLUTION SUBCUTANEOUS at 21:45

## 2021-06-10 RX ADMIN — ASPIRIN 81 MG: 81 TABLET, COATED ORAL at 10:08

## 2021-06-10 RX ADMIN — Medication 2 MG: at 08:06

## 2021-06-10 RX ADMIN — GUAIFENESIN 1200 MG: 600 TABLET, EXTENDED RELEASE ORAL at 21:36

## 2021-06-10 RX ADMIN — OXYCODONE HYDROCHLORIDE AND ACETAMINOPHEN 1 TABLET: 5; 325 TABLET ORAL at 21:36

## 2021-06-10 RX ADMIN — AMLODIPINE BESYLATE 10 MG: 10 TABLET ORAL at 10:08

## 2021-06-10 RX ADMIN — HEPARIN SODIUM 5000 UNITS: 5000 INJECTION INTRAVENOUS; SUBCUTANEOUS at 13:03

## 2021-06-10 RX ADMIN — HEPARIN SODIUM 5000 UNITS: 5000 INJECTION INTRAVENOUS; SUBCUTANEOUS at 21:37

## 2021-06-10 RX ADMIN — SERTRALINE HYDROCHLORIDE 100 MG: 100 TABLET ORAL at 10:08

## 2021-06-10 RX ADMIN — COLLAGENASE SANTYL: 250 OINTMENT TOPICAL at 10:12

## 2021-06-10 RX ADMIN — METOPROLOL TARTRATE 25 MG: 25 TABLET, FILM COATED ORAL at 10:08

## 2021-06-10 RX ADMIN — ZOLPIDEM TARTRATE 5 MG: 5 TABLET ORAL at 21:36

## 2021-06-10 RX ADMIN — SODIUM CHLORIDE, PRESERVATIVE FREE 10 ML: 5 INJECTION INTRAVENOUS at 21:32

## 2021-06-10 RX ADMIN — ONDANSETRON 4 MG: 2 INJECTION INTRAMUSCULAR; INTRAVENOUS at 08:06

## 2021-06-10 RX ADMIN — MEROPENEM 1000 MG: 1 INJECTION, POWDER, FOR SOLUTION INTRAVENOUS at 21:32

## 2021-06-10 RX ADMIN — SODIUM CHLORIDE, PRESERVATIVE FREE 10 ML: 5 INJECTION INTRAVENOUS at 10:10

## 2021-06-10 RX ADMIN — METOPROLOL TARTRATE 25 MG: 25 TABLET, FILM COATED ORAL at 21:36

## 2021-06-10 RX ADMIN — INSULIN LISPRO 1 UNITS: 100 INJECTION, SOLUTION INTRAVENOUS; SUBCUTANEOUS at 17:27

## 2021-06-10 RX ADMIN — OXYCODONE HYDROCHLORIDE AND ACETAMINOPHEN 1 TABLET: 5; 325 TABLET ORAL at 02:11

## 2021-06-10 RX ADMIN — PIPERACILLIN AND TAZOBACTAM 3375 MG: 3; .375 INJECTION, POWDER, LYOPHILIZED, FOR SOLUTION INTRAVENOUS at 02:13

## 2021-06-10 RX ADMIN — INSULIN LISPRO 1 UNITS: 100 INJECTION, SOLUTION INTRAVENOUS; SUBCUTANEOUS at 08:01

## 2021-06-10 RX ADMIN — MEROPENEM 1000 MG: 1 INJECTION, POWDER, FOR SOLUTION INTRAVENOUS at 13:03

## 2021-06-10 RX ADMIN — INSULIN LISPRO 2 UNITS: 100 INJECTION, SOLUTION INTRAVENOUS; SUBCUTANEOUS at 12:16

## 2021-06-10 RX ADMIN — GUAIFENESIN 1200 MG: 600 TABLET, EXTENDED RELEASE ORAL at 10:08

## 2021-06-10 RX ADMIN — ATORVASTATIN CALCIUM 40 MG: 40 TABLET, FILM COATED ORAL at 21:36

## 2021-06-10 RX ADMIN — TAMSULOSIN HYDROCHLORIDE 0.4 MG: 0.4 CAPSULE ORAL at 10:08

## 2021-06-10 ASSESSMENT — PAIN DESCRIPTION - DESCRIPTORS
DESCRIPTORS: THROBBING

## 2021-06-10 ASSESSMENT — PAIN DESCRIPTION - ONSET
ONSET: ON-GOING

## 2021-06-10 ASSESSMENT — PAIN SCALES - GENERAL
PAINLEVEL_OUTOF10: 7
PAINLEVEL_OUTOF10: 7
PAINLEVEL_OUTOF10: 8
PAINLEVEL_OUTOF10: 7
PAINLEVEL_OUTOF10: 7
PAINLEVEL_OUTOF10: 4
PAINLEVEL_OUTOF10: 5
PAINLEVEL_OUTOF10: 0
PAINLEVEL_OUTOF10: 0

## 2021-06-10 ASSESSMENT — PAIN - FUNCTIONAL ASSESSMENT
PAIN_FUNCTIONAL_ASSESSMENT: ACTIVITIES ARE NOT PREVENTED

## 2021-06-10 ASSESSMENT — PAIN DESCRIPTION - FREQUENCY
FREQUENCY: CONTINUOUS

## 2021-06-10 ASSESSMENT — PAIN DESCRIPTION - PAIN TYPE
TYPE: CHRONIC PAIN

## 2021-06-10 ASSESSMENT — PAIN DESCRIPTION - LOCATION
LOCATION: SCROTUM

## 2021-06-10 ASSESSMENT — ENCOUNTER SYMPTOMS
ALLERGIC/IMMUNOLOGIC NEGATIVE: 1
GASTROINTESTINAL NEGATIVE: 1
RESPIRATORY NEGATIVE: 1
EYES NEGATIVE: 1

## 2021-06-10 ASSESSMENT — PAIN DESCRIPTION - PROGRESSION
CLINICAL_PROGRESSION: NOT CHANGED

## 2021-06-10 ASSESSMENT — PAIN DESCRIPTION - ORIENTATION
ORIENTATION: RIGHT;LEFT;MID
ORIENTATION: RIGHT;LEFT;ANTERIOR;MID;POSTERIOR
ORIENTATION: RIGHT;LEFT;MID
ORIENTATION: RIGHT;LEFT;MID

## 2021-06-10 NOTE — PROGRESS NOTES
Infectious Disease Progress Note  6/10/2021   Patient Name: Gucci Knutson : 1975       Reason for visit: F/u right groin wound with cellulitis (status post debridement for necrotizing fasciitis)  History:? Interval history noted  Denies n/v/d/f or untoward effects of antimicrobials    Physical Exam:  Vital Signs: BP (!) 159/89   Pulse 75   Temp 97.9 °F (36.6 °C) (Oral)   Resp 16   Ht 5' 9\" (1.753 m)   Wt 270 lb (122.5 kg)   SpO2 100%   BMI 39.87 kg/m²     Gen: alert and oriented X3, no distress  Skin: no stigmata of endocarditis  Wounds: right groin ulcer with surrounding erythema and slough at the base. HEMT: AT/NC Oropharynx pink, moist, and without lesions or exudates; dentition in good state of repair  Eyes: PERRLA, EOMI, conjunctiva pink, sclera anicteric. Neck: Supple. Trachea midline. No LAD. Chest: no distress and CTA. Good air movement. Heart: RRR and no MRG. Abd: soft, non-distended, no tenderness, no hepatomegaly. Normoactive bowel sounds. Ext: no clubbing, cyanosis, or edema  Catheter Site: without erythema or tenderness  LDA: left arm midline. Neuro: Mental status intact. CN 2-12 intact and no focal sensory or motor deficits     Radiologic / Imaging / TESTING  CT of the abdomen and pelvis  Redemonstration of debridement involving the rightward aspect of the scrotum and the perineum. The amount of fat stranding adjacent to the wound appears increased. However, the gas and fluid collection seen previously within the right scrotum is no longer detected. The bubbles of gas seen previously tracking from the wound also no longer detected. No gas/fluid collections are identified on the current examination to suggest abscess formation.   Scrotal skin thickening, especially the right, increased when compared to the previous exam.  Small left hydrocele, decreased when compared to the previous exam.        Labs:    Recent Results (from the past 24 hour(s))   POCT Glucose    Collection Time: 06/09/21 11:03 AM   Result Value Ref Range    POC Glucose 170 (H) 70 - 99 MG/DL   Urinalysis    Collection Time: 06/09/21 11:19 AM   Result Value Ref Range    Color, UA YELLOW YELLOW    Clarity, UA CLEAR CLEAR    Glucose, Urine 50 (A) NEGATIVE MG/DL    Bilirubin Urine NEGATIVE NEGATIVE MG/DL    Ketones, Urine NEGATIVE NEGATIVE MG/DL    Specific Gravity, UA 1.014 1.001 - 1.035    Blood, Urine SMALL (A) NEGATIVE    pH, Urine 5.0 5.0 - 8.0    Protein, UA >500 (HH) NEGATIVE MG/DL    Urobilinogen, Urine NEGATIVE 0.2 - 1.0 MG/DL    Nitrite Urine, Quantitative NEGATIVE NEGATIVE    Leukocyte Esterase, Urine NEGATIVE NEGATIVE    RBC, UA <1 0 - 3 /HPF    WBC, UA 1 0 - 2 /HPF    Bacteria, UA NEGATIVE NEGATIVE /HPF    Trichomonas, UA NONE SEEN NONE SEEN /HPF   POCT Glucose    Collection Time: 06/09/21 11:48 AM   Result Value Ref Range    POC Glucose 170 (H) 70 - 99 MG/DL   Basic metabolic panel    Collection Time: 06/09/21  3:30 PM   Result Value Ref Range    Sodium 134 (L) 135 - 145 MMOL/L    Potassium 4.9 3.5 - 5.1 MMOL/L    Chloride 105 99 - 110 mMol/L    CO2 20 (L) 21 - 32 MMOL/L    Anion Gap 9 4 - 16    BUN 36 (H) 6 - 23 MG/DL    CREATININE 2.3 (H) 0.9 - 1.3 MG/DL    Glucose 147 (H) 70 - 99 MG/DL    Calcium 8.3 8.3 - 10.6 MG/DL    GFR Non- 31 (L) >60 mL/min/1.73m2    GFR  37 (L) >60 mL/min/1.73m2   POCT Glucose    Collection Time: 06/09/21  3:58 PM   Result Value Ref Range    POC Glucose 223 (H) 70 - 99 MG/DL   POCT Glucose    Collection Time: 06/09/21  6:58 PM   Result Value Ref Range    POC Glucose 262 (H) 70 - 99 MG/DL   POCT Glucose    Collection Time: 06/09/21  8:15 PM   Result Value Ref Range    POC Glucose 214 (H) 70 - 99 MG/DL   POCT Glucose    Collection Time: 06/10/21  6:39 AM   Result Value Ref Range    POC Glucose 197 (H) 70 - 99 MG/DL   CBC    Collection Time: 06/10/21  8:15 AM   Result Value Ref Range    WBC 6.2 4.0 - 10.5 K/CU MM    RBC 2.99 (L) 4.6 - 6.2 M/CU MM Hemoglobin 8.4 (L) 13.5 - 18.0 GM/DL    Hematocrit 26.6 (L) 42 - 52 %    MCV 89.0 78 - 100 FL    MCH 28.1 27 - 31 PG    MCHC 31.6 (L) 32.0 - 36.0 %    RDW 14.1 11.7 - 14.9 %    Platelets 122 196 - 798 K/CU MM    MPV 9.9 7.5 - 11.1 FL   C-Reactive Protein    Collection Time: 06/10/21  8:15 AM   Result Value Ref Range    CRP, High Sensitivity 7.4 mg/L   Comprehensive Metabolic Panel    Collection Time: 06/10/21  8:15 AM   Result Value Ref Range    Sodium 137 135 - 145 MMOL/L    Potassium 5.0 3.5 - 5.1 MMOL/L    Chloride 107 99 - 110 mMol/L    CO2 19 (L) 21 - 32 MMOL/L    BUN 42 (H) 6 - 23 MG/DL    CREATININE 2.3 (H) 0.9 - 1.3 MG/DL    Glucose 186 (H) 70 - 99 MG/DL    Calcium 8.6 8.3 - 10.6 MG/DL    Albumin 2.5 (L) 3.4 - 5.0 GM/DL    Total Protein 6.3 (L) 6.4 - 8.2 GM/DL    Total Bilirubin 0.2 0.0 - 1.0 MG/DL    ALT 7 (L) 10 - 40 U/L    AST 9 (L) 15 - 37 IU/L    Alkaline Phosphatase 60 40 - 128 IU/L    GFR Non- 31 (L) >60 mL/min/1.73m2    GFR  37 (L) >60 mL/min/1.73m2    Anion Gap 11 4 - 16   Phosphorus    Collection Time: 06/10/21  8:15 AM   Result Value Ref Range    Phosphorus 4.6 2.5 - 4.9 MG/DL   Magnesium    Collection Time: 06/10/21  8:15 AM   Result Value Ref Range    Magnesium 2.4 1.8 - 2.4 mg/dl     CULTURE results: Invalid input(s): BLOOD CULTURE,  URINE CULTURE, SURGICAL CULTURE    Diagnosis:  Patient Active Problem List   Diagnosis    Hiatal hernia    Diabetes mellitus type 2, improved controlled    Diabetic neuropathy (HCC)    Panic attack    Anxiety associated with depression    Neck pain    DANIELA (obstructive sleep apnea)    Urinary frequency    Bilateral carpal tunnel syndrome- left worse than right    Hyperlipidemia with target LDL less than 100    Essential hypertension    Bronchitis    Osteomyelitis ([address]    Diabetic ulcer of left midfoot ([address]    WD-Diabetic ulcer of left midfoot associated with type 2 diabetes mellitus, with fat layer exposed ([address]    Abscess    Periumbilical hernia    WD-Wound, surgical, infected, initial encounter    Sepsis ([address]    Cellulitis of left foot    Constipation    Intractable nausea and vomiting    Uncontrolled type 2 diabetes mellitus (HCC)    Nausea and vomiting    Diabetic foot infection (HCC)    Acute renal failure (ARF) (HCC)    Osteomyelitis of fourth toe of right foot (HCC)    Cellulitis    Cellulitis of left arm    Cellulitis, scrotum    Acute epididymitis    Irene gangrene    Recurrent major depressive disorder, in full remission ([address]    Generalized anxiety disorder    Morbid obesity with body mass index (BMI) of 40.0 to 44.9 in adult [facility])    Necrotizing fasciitis ([address]    Syncope and collapse    Right groin wound    Ulcer of right groin with fat layer exposed ([address]       Active Problems  Active Problems:    Syncope and collapse    Right groin wound    Ulcer of right groin with fat layer exposed ([address]  Resolved Problems:    * No resolved hospital problems. *      Impression and plan   Summary and rationale: Patient is a 55 y.o.  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 Treated with vancomycin and metronidazole. Readmitted for syncope. Has right groin wound with cellulitis. Wound cx postive for E coli and E cloacae.  Tinea cruris    Clinical status: Improving   Therapeutic: discontinue Zosyn, start meropenem (owing to posibility of Amp C beta-lactamase), aim to treat for 2 weeks. Await susceptibility testing, may discharge home with fluoroquinolone if it is susceptible.     Fluconazole 200 mg po weekly for 2 weeks   Diagnostic: trend CRP   F/u: wound cx   Other:        Electronically signed by: Electronically signed by Neelam Marcano MD on 6/10/2021 at 9:53 AM

## 2021-06-10 NOTE — FLOWSHEET NOTE
06/10/21 1238   Vital Signs   Blood Pressure Lying 140/89   Pulse Lying 70 PER MINUTE   Blood Pressure Sitting 92/73   Pulse Sitting 72 PER MINUTE   Blood Pressure Standing 81/52   Pulse Standing 72 PER MINUTE

## 2021-06-10 NOTE — CONSULTS
IV consult completed. Midline infuses and flushes easily. [facility] primary nurse was notified to continue use of Midline; however, Brian Krueger is not yet approved to declot midlines.

## 2021-06-10 NOTE — PROGRESS NOTES
Nephrology Progress Note  6/10/2021 9:14 AM        Subjective:   Admit Date: 6/5/2021  PCP: Mark White MD    Interval History: feeling little better   Pain better     Diet: good    ROS:  No sob  Reported good UOP  Bladder scan showed minimal PVR    Data:     Current meds:    ondansetron  4 mg Intravenous Once    amLODIPine  10 mg Oral Daily    metoprolol tartrate  25 mg Oral BID    atorvastatin  40 mg Oral Nightly    piperacillin-tazobactam  3,375 mg Intravenous Q8H    collagenase   Topical Daily    miconazole   Topical BID    aspirin  81 mg Oral Daily    tamsulosin  0.4 mg Oral Daily    sertraline  100 mg Oral Daily    sodium chloride flush  5-40 mL Intravenous 2 times per day    heparin (porcine)  5,000 Units Subcutaneous 3 times per day    insulin glargine  0.15 Units/kg Subcutaneous Nightly    insulin lispro  0.05 Units/kg Subcutaneous TID WC    insulin lispro  0-6 Units Subcutaneous TID WC    insulin lispro  0-3 Units Subcutaneous Nightly    guaiFENesin  1,200 mg Oral BID      dextrose      sodium chloride           I/O last 3 completed shifts:  In: -   Out: 1375 [Urine:1375]    CBC:   Recent Labs     06/08/21  0445 06/09/21  0530 06/10/21  0815   WBC 4.6 5.5 6.2   HGB 8.0* 8.8* 8.4*    330 330          Recent Labs     06/08/21  0445 06/09/21  0530 06/09/21  1530    137 134*   K 5.0 5.6* 4.9    108 105   CO2 19* 19* 20*   BUN 40* 42* 36*   CREATININE 2.1* 2.5* 2.3*   GLUCOSE 168* 191* 147*       Lab Results   Component Value Date    CALCIUM 8.3 06/09/2021    PHOS 4.5 06/07/2021       Objective:     Vitals: BP (!) 159/89   Pulse 75   Temp 97.9 °F (36.6 °C) (Oral)   Resp 16   Ht 5' 9\" (1.753 m)   Wt 270 lb (122.5 kg)   SpO2 100%   BMI 39.87 kg/m²     General appearance:  No ac distress   HEENT:  No gross conj pallor   Neck:  supple  Lungs:  No crackles  Heart:  Seems RRR  Abdomen: soft  Extremities:  Mild - trace LE edema   Groin pain better         Problem List

## 2021-06-10 NOTE — PROGRESS NOTES
General Surgery-Dr. Mechelle Ramirez Day: 6    Chief Complaint on Admission: syncope      Subjective:     Feeling better today. States he had some blood on toilet paper when he wiped after BM. Denies active bleed. Tolerating PO. Denies F/C.     ROS:  Review of Systems   Constitutional: Negative for chills and fever. HENT: Negative. Eyes: Negative. Respiratory: Negative. Gastrointestinal: Negative. Endocrine: Negative. Genitourinary: Negative for difficulty urinating, discharge and scrotal swelling. Musculoskeletal: Negative. Skin: Positive for wound. Allergic/Immunologic: Negative. Neurological: Positive for syncope. Hematological: Negative. Psychiatric/Behavioral: Negative. Allergies  Adhesive tape, Doxycycline, and Reglan [metoclopramide]          Diagnosis Date    Abscess 2010    scrotal    Acute renal failure (ARF) ([address] 8/4/2019    Anxiety associated with depression     Anxiety associated with depression     Back pain 7/2/2012    Chest pain 5/1/2013    Diabetic nephropathy ([address]     Diabetic neuropathy ([address] 12/22/2011    Diabetic ulcer of left midfoot associated with type 2 diabetes mellitus, with fat layer exposed ([address] 7/18/2017    Diverticulosis     C scope + Dr. Demetri Winters DM (diabetes mellitus), type 2 ([address] 2002    DR. 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 Demetri Winters Necrotizing fasciitis [facility])     Nose fracture 1988    Panic attacks     Pericarditis 2003    Hospitalized with s/p heart cath normal    Peripheral autonomic neuropathy due to diabetes mellitus ([address]     Axonal EMG- [facility], March 2011    Sebaceous cyst 9/1/2011    URI (upper respiratory infection) 2/27/2012    WD-Wound, surgical, infected, initial encounter 12/8/2017    Wrist fracture 1986       Objective:     Vitals:    06/10/21 0812   BP: (!) 159/89   Pulse: 75   Resp: 16 Temp: 97.9 °F (36.6 °C)   SpO2: 100%       TEMPERATURE:  Current -Temp: 97.9 °F (36.6 °C); Max - Temp  Av.2 °F (36.8 °C)  Min: 97.9 °F (36.6 °C)  Max: 98.3 °F (36.8 °C)    I/O this shift:  In: 10 [I.V.:10]  Out: 700 [Urine:700]I/O last 3 completed shifts:  In: -   Out: 1375 [Urine:1375]      Physical Exam:  Physical Exam  Constitutional:       General: He is not in acute distress. Appearance: He is not ill-appearing, toxic-appearing or diaphoretic. HENT:      Head: Normocephalic and atraumatic. Right Ear: External ear normal.      Left Ear: External ear normal.      Nose: Nose normal.   Eyes:      General:         Right eye: No discharge. Left eye: No discharge. Extraocular Movements: Extraocular movements intact. Cardiovascular:      Rate and Rhythm: Normal rate. Pulmonary:      Effort: Pulmonary effort is normal.   Abdominal:      Palpations: Abdomen is soft. Tenderness: There is no abdominal tenderness. Genitourinary:     Comments: Groin wound with packing in place, beefy red base without drainage or odor. Some minimal yellow tissue sloughing present. Musculoskeletal:         General: No swelling. Cervical back: Normal range of motion. Skin:     General: Skin is warm. Neurological:      General: No focal deficit present. Mental Status: He is alert.            Scheduled Meds:   meropenem  1,000 mg Intravenous Q8H    ondansetron  4 mg Intravenous Once    amLODIPine  10 mg Oral Daily    metoprolol tartrate  25 mg Oral BID    atorvastatin  40 mg Oral Nightly    collagenase   Topical Daily    miconazole   Topical BID    aspirin  81 mg Oral Daily    tamsulosin  0.4 mg Oral Daily    sertraline  100 mg Oral Daily    sodium chloride flush  5-40 mL Intravenous 2 times per day    heparin (porcine)  5,000 Units Subcutaneous 3 times per day    insulin glargine  0.15 Units/kg Subcutaneous Nightly    insulin lispro  0.05 Units/kg Subcutaneous TID WC    insulin lispro  0-6 Units Subcutaneous TID     insulin lispro  0-3 Units Subcutaneous Nightly    guaiFENesin  1,200 mg Oral BID     ContinuousInfusions:   dextrose      sodium chloride       PRN Meds:ondansetron, oxyCODONE-acetaminophen, glucose, dextrose, glucagon (rDNA), dextrose, sodium chloride flush, sodium chloride, potassium chloride **OR** potassium alternative oral replacement **OR** potassium chloride, magnesium sulfate, promethazine **OR** ondansetron, polyethylene glycol, fleet, acetaminophen **OR** acetaminophen, zolpidem, guaiFENesin-dextromethorphan, benzonatate, calcium carbonate, morphine      Labs/Imaging Results:   Lab Results   Component Value Date    WBC 6.2 06/10/2021    HGB 8.4 (L) 06/10/2021    HCT 26.6 (L) 06/10/2021    MCV 89.0 06/10/2021     06/10/2021     Lab Results   Component Value Date     06/10/2021    K 5.0 06/10/2021     06/10/2021    CO2 19 (L) 06/10/2021    BUN 42 (H) 06/10/2021    CREATININE 2.3 (H) 06/10/2021    GLUCOSE 186 (H) 06/10/2021    CALCIUM 8.6 06/10/2021    PROT 6.3 (L) 06/10/2021    LABALBU 2.5 (L) 06/10/2021    BILITOT 0.2 06/10/2021    ALKPHOS 60 06/10/2021    AST 9 (L) 06/10/2021    ALT 7 (L) 06/10/2021    LABGLOM 31 (L) 06/10/2021    GFRAA 37 (L) 06/10/2021     CT A/P:  Redemonstration of debridement involving the rightward aspect of the scrotum   and the perineum.       The amount of fat stranding adjacent to the wound appears increased.    However, the gas and fluid collection seen previously within the right   scrotum is no longer detected.  The bubbles of gas seen previously tracking   from the wound also no longer detected.       No gas/fluid collections are identified on the current examination to suggest   abscess formation.       Scrotal skin thickening, especially the right, increased when compared to the   previous exam.       Small left hydrocele, decreased when compared to the previous exam.         Assessment:       56 y/o M with syncope, history of tolu gangrene s/p excisional debridement x two    Plan:       -D/w Wound Nurse. Continue daily dressing changes and santyl to sloughing areas. Wound vac had been tried in past; however, pt had difficulty maintaining seal while at home. Doubtful that having vac during this hospital stay would provide any significant benefit. Therefore continue with current regimen.     -Suspect blood seen on toilet paper was from posterior edge of wound. There was a clot present but no active bleed. There was no blood near/around anus or anal drainage. Hgb 8.4 from 8.8. Observe. -ID consult reviewed. Plans noted for rule out pseudomonal and fungal infection. Noted Zosyn and cx pending. CRP trending down. Procalcitonin from yesterday was less than 0.50.     -Plan d/w pt at bedside.  Will continue to see while pt is in hospital.       Electronically signed by Sukhi Hughes II, MD on 6/10/2021 at 12:01 PM

## 2021-06-10 NOTE — PROGRESS NOTES
Cardiology Progress Note     Today's Plan: will sign off and be available if needed     Admit Date:  6/5/2021    Consult reason/ Seen today for: sycope    Subjective and  Overnight Events:  C/o feeling tired today - denies chest pain or shortness of breath      Telemetry:  SR no arrhythmia or bradycardia noted     Assessment / Plan / Recommendation:     1. Syncope- + orthostatic blood pressures-- at home on flomax/hygroton/ lotrel - hold antihypertensives/ diuretic- recommend to keep hydrated - will check holter to assess for arrhythmia as well   2. Elevated troponin- ? Type 2 rise in the setting of anemia/CKD Cici scan done- mild lateral wall ischemia- optimize medical therapy - added bb- metoprolol- continue with ASA and lipitor    3. H/o fornier gangrene- on ATB- continues with groin pain  4. HTN- presented with low bp and syncope-give IV fluid- antihypertensives placed on hold- bp improved -now running high -increase amlodipine-       History of Presenting Illness:    Chief complain on admission : 55 y. o.year old who is admitted for  Chief Complaint   Patient presents with    Wound Check     Open wound to scrotum.  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   Neck- trachea is midline  Eyes:  PEERL, Conjunctiva normal  Respiratory:  Normal breath sounds, No respiratory distress  Cardiovascular:  Normal heart rate, Normal rhythm, no murmurs appreciated, No rubs appreciated, No gallops appreciated, JVP not elevated  Abdomen/GI:  Large round non tender  Musculoskeletal:  Intact distal pulses, no edema to lower legs,  No tenderness, No cyanosis, No clubbing. Integument:  Warm, Dry-dressing to the right groin site-   Lymphatic:  No lymphadenopathy noted.    Neurologic:  Alert & oriented  Psychiatric:  Affect and Mood :pleasant     Medications:    meropenem  1,000 mg Intravenous Q8H    fluconazole  200 mg Oral Weekly    ondansetron  4 mg Intravenous Once    amLODIPine  10 mg Oral Daily    metoprolol tartrate  25 mg Oral BID    atorvastatin  40 mg Oral Nightly    collagenase   Topical Daily    miconazole   Topical BID    aspirin  81 mg Oral Daily    tamsulosin  0.4 mg Oral Daily    sertraline  100 mg Oral Daily    sodium chloride flush  5-40 mL Intravenous 2 times per day    heparin (porcine)  5,000 Units Subcutaneous 3 times per day    insulin glargine  0.15 Units/kg Subcutaneous Nightly    insulin lispro  0.05 Units/kg Subcutaneous TID WC    insulin lispro  0-6 Units Subcutaneous TID WC    insulin lispro  0-3 Units Subcutaneous Nightly    guaiFENesin  1,200 mg Oral BID      dextrose      sodium chloride       ondansetron, oxyCODONE-acetaminophen, glucose, dextrose, glucagon (rDNA), dextrose, sodium chloride flush, sodium chloride, potassium chloride **OR** potassium alternative oral replacement **OR** potassium chloride, magnesium sulfate, promethazine **OR** ondansetron, polyethylene glycol, fleet, acetaminophen **OR** acetaminophen, zolpidem, guaiFENesin-dextromethorphan, benzonatate, calcium carbonate, morphine    Lab Data:  CBC:   Recent Labs     06/08/21  0445 06/09/21  0530 06/10/21  0815   WBC 4.6 5.5 6.2   HGB 8.0* 8.8* 8.4*   HCT 25.1* 26.7* 26.6*   MCV 89.6 89.3 89.0    330 330     BMP:   Recent Labs     06/09/21  0530 06/09/21  1530 06/10/21  0815    134* 137   K 5.6* 4.9 5.0    105 107   CO2 19* 20* 19*   PHOS  --   --  4.6   BUN 42* 36* 42*   CREATININE 2.5* 2.3* 2.3*     PT/INR: No results for input(s): PROTIME, INR in the last 72 hours. BNP:  No results for input(s): PROBNP in the last 72 hours. TROPONIN:   No results for input(s): TROPONINT in the last 72 hours. Impression:  Active Problems:    Syncope and collapse    Right groin wound    Ulcer of right groin with fat layer exposed ([address]  Resolved Problems:    * No resolved hospital problems. *       All labs, medications and tests reviewed by myself, continue all other medications of all above medical condition listed as is except for changes mentioned above. Thank you   Please call with questions. Electronically signed by Allie Douglas MD on 6/10/2021 at 4:31 PM  I have seen ,spoken to  and examined this patient personally, independently of the nurse practitioner. I have reviewed the hospital care given to date and reviewed all pertinent labs and imaging. The plan was developed mutually at the time of the visit with the patient,  NP  and myself. I have spoken with patient, nursing staff and provided written and verbal instructions . The above note has been reviewed and I agree with the assessment, diagnosis, and treatment plan with changes made by me as follows     CARDIOLOGY ATTENDING ADDENDUM    HPI:  I have reviewed the above HPI  And agree with above   Mike Woods is a 55 y. o.year old who and presents with had concerns including Wound Check (Open wound to scrotum. Had a coughing fit at home and was incontinent and got stool in his wound).   Chief Complaint   Patient presents with    Wound Check

## 2021-06-11 LAB
ALBUMIN SERPL-MCNC: 2.9 GM/DL (ref 3.4–5)
ALP BLD-CCNC: 66 IU/L (ref 40–129)
ALT SERPL-CCNC: 9 U/L (ref 10–40)
ANION GAP SERPL CALCULATED.3IONS-SCNC: 10 MMOL/L (ref 4–16)
AST SERPL-CCNC: 11 IU/L (ref 15–37)
BILIRUB SERPL-MCNC: 0.1 MG/DL (ref 0–1)
BUN BLDV-MCNC: 41 MG/DL (ref 6–23)
CALCIUM SERPL-MCNC: 8.3 MG/DL (ref 8.3–10.6)
CHLORIDE BLD-SCNC: 108 MMOL/L (ref 99–110)
CO2: 21 MMOL/L (ref 21–32)
CREAT SERPL-MCNC: 2.3 MG/DL (ref 0.9–1.3)
EKG ATRIAL RATE: 74 BPM
EKG DIAGNOSIS: NORMAL
EKG P AXIS: 18 DEGREES
EKG P-R INTERVAL: 192 MS
EKG Q-T INTERVAL: 392 MS
EKG QRS DURATION: 86 MS
EKG QTC CALCULATION (BAZETT): 435 MS
EKG R AXIS: 20 DEGREES
EKG T AXIS: 23 DEGREES
EKG VENTRICULAR RATE: 74 BPM
GFR AFRICAN AMERICAN: 37 ML/MIN/1.73M2
GFR NON-AFRICAN AMERICAN: 31 ML/MIN/1.73M2
GLUCOSE BLD-MCNC: 167 MG/DL (ref 70–99)
GLUCOSE BLD-MCNC: 188 MG/DL (ref 70–99)
GLUCOSE BLD-MCNC: 208 MG/DL (ref 70–99)
GLUCOSE BLD-MCNC: 224 MG/DL (ref 70–99)
GLUCOSE BLD-MCNC: 239 MG/DL (ref 70–99)
HCT VFR BLD CALC: 24 % (ref 42–52)
HEMOGLOBIN: 8.2 GM/DL (ref 13.5–18)
HIGH SENSITIVE C-REACTIVE PROTEIN: 6.3 MG/L
MAGNESIUM: 2.4 MG/DL (ref 1.8–2.4)
MCH RBC QN AUTO: 29.6 PG (ref 27–31)
MCHC RBC AUTO-ENTMCNC: 34.2 % (ref 32–36)
MCV RBC AUTO: 86.6 FL (ref 78–100)
PDW BLD-RTO: 13.9 % (ref 11.7–14.9)
PHOSPHORUS: 5.7 MG/DL (ref 2.5–4.9)
PLATELET # BLD: 326 K/CU MM (ref 140–440)
PMV BLD AUTO: 9.5 FL (ref 7.5–11.1)
POTASSIUM SERPL-SCNC: 4.8 MMOL/L (ref 3.5–5.1)
PROCALCITONIN: 0.21
RBC # BLD: 2.77 M/CU MM (ref 4.6–6.2)
SODIUM BLD-SCNC: 139 MMOL/L (ref 135–145)
TOTAL PROTEIN: 5.9 GM/DL (ref 6.4–8.2)
WBC # BLD: 5.2 K/CU MM (ref 4–10.5)

## 2021-06-11 PROCEDURE — APPNB15 APP NON BILLABLE TIME 0-15 MINS: Performed by: NURSE PRACTITIONER

## 2021-06-11 PROCEDURE — 86141 C-REACTIVE PROTEIN HS: CPT

## 2021-06-11 PROCEDURE — 80053 COMPREHEN METABOLIC PANEL: CPT

## 2021-06-11 PROCEDURE — 36415 COLL VENOUS BLD VENIPUNCTURE: CPT

## 2021-06-11 PROCEDURE — 94150 VITAL CAPACITY TEST: CPT

## 2021-06-11 PROCEDURE — 2580000003 HC RX 258: Performed by: INTERNAL MEDICINE

## 2021-06-11 PROCEDURE — 84145 PROCALCITONIN (PCT): CPT

## 2021-06-11 PROCEDURE — 6370000000 HC RX 637 (ALT 250 FOR IP): Performed by: FAMILY MEDICINE

## 2021-06-11 PROCEDURE — 94761 N-INVAS EAR/PLS OXIMETRY MLT: CPT

## 2021-06-11 PROCEDURE — 1200000000 HC SEMI PRIVATE

## 2021-06-11 PROCEDURE — 99232 SBSQ HOSP IP/OBS MODERATE 35: CPT | Performed by: SURGERY

## 2021-06-11 PROCEDURE — 6360000002 HC RX W HCPCS: Performed by: INTERNAL MEDICINE

## 2021-06-11 PROCEDURE — 94664 DEMO&/EVAL PT USE INHALER: CPT

## 2021-06-11 PROCEDURE — 6360000002 HC RX W HCPCS: Performed by: FAMILY MEDICINE

## 2021-06-11 PROCEDURE — 85027 COMPLETE CBC AUTOMATED: CPT

## 2021-06-11 PROCEDURE — 84100 ASSAY OF PHOSPHORUS: CPT

## 2021-06-11 PROCEDURE — 93010 ELECTROCARDIOGRAM REPORT: CPT | Performed by: INTERNAL MEDICINE

## 2021-06-11 PROCEDURE — 6370000000 HC RX 637 (ALT 250 FOR IP): Performed by: NURSE PRACTITIONER

## 2021-06-11 PROCEDURE — 2580000003 HC RX 258: Performed by: FAMILY MEDICINE

## 2021-06-11 PROCEDURE — 2500000003 HC RX 250 WO HCPCS: Performed by: FAMILY MEDICINE

## 2021-06-11 PROCEDURE — 82962 GLUCOSE BLOOD TEST: CPT

## 2021-06-11 PROCEDURE — 83735 ASSAY OF MAGNESIUM: CPT

## 2021-06-11 RX ADMIN — INSULIN GLARGINE 18 UNITS: 100 INJECTION, SOLUTION SUBCUTANEOUS at 21:21

## 2021-06-11 RX ADMIN — COLLAGENASE SANTYL: 250 OINTMENT TOPICAL at 10:46

## 2021-06-11 RX ADMIN — INSULIN LISPRO 2 UNITS: 100 INJECTION, SOLUTION INTRAVENOUS; SUBCUTANEOUS at 12:56

## 2021-06-11 RX ADMIN — MEROPENEM 1000 MG: 1 INJECTION, POWDER, FOR SOLUTION INTRAVENOUS at 05:19

## 2021-06-11 RX ADMIN — SODIUM CHLORIDE, PRESERVATIVE FREE 10 ML: 5 INJECTION INTRAVENOUS at 08:55

## 2021-06-11 RX ADMIN — MEROPENEM 1000 MG: 1 INJECTION, POWDER, FOR SOLUTION INTRAVENOUS at 12:56

## 2021-06-11 RX ADMIN — OXYCODONE HYDROCHLORIDE AND ACETAMINOPHEN 1 TABLET: 5; 325 TABLET ORAL at 17:24

## 2021-06-11 RX ADMIN — SODIUM CHLORIDE, PRESERVATIVE FREE 10 ML: 5 INJECTION INTRAVENOUS at 21:20

## 2021-06-11 RX ADMIN — MEROPENEM 1000 MG: 1 INJECTION, POWDER, FOR SOLUTION INTRAVENOUS at 21:20

## 2021-06-11 RX ADMIN — MICONAZOLE NITRATE: 2 POWDER TOPICAL at 10:45

## 2021-06-11 RX ADMIN — Medication 2 MG: at 13:41

## 2021-06-11 RX ADMIN — Medication 2 MG: at 21:20

## 2021-06-11 RX ADMIN — Medication 2 MG: at 08:57

## 2021-06-11 RX ADMIN — HEPARIN SODIUM 5000 UNITS: 5000 INJECTION INTRAVENOUS; SUBCUTANEOUS at 14:11

## 2021-06-11 RX ADMIN — GUAIFENESIN 1200 MG: 600 TABLET, EXTENDED RELEASE ORAL at 08:55

## 2021-06-11 RX ADMIN — MICONAZOLE NITRATE: 2 POWDER TOPICAL at 21:21

## 2021-06-11 RX ADMIN — HEPARIN SODIUM 5000 UNITS: 5000 INJECTION INTRAVENOUS; SUBCUTANEOUS at 21:22

## 2021-06-11 RX ADMIN — METOPROLOL TARTRATE 25 MG: 25 TABLET, FILM COATED ORAL at 08:55

## 2021-06-11 RX ADMIN — SERTRALINE HYDROCHLORIDE 100 MG: 100 TABLET ORAL at 08:55

## 2021-06-11 RX ADMIN — GUAIFENESIN 1200 MG: 600 TABLET, EXTENDED RELEASE ORAL at 21:19

## 2021-06-11 RX ADMIN — OXYCODONE HYDROCHLORIDE AND ACETAMINOPHEN 1 TABLET: 5; 325 TABLET ORAL at 06:08

## 2021-06-11 RX ADMIN — INSULIN LISPRO 2 UNITS: 100 INJECTION, SOLUTION INTRAVENOUS; SUBCUTANEOUS at 08:55

## 2021-06-11 RX ADMIN — ATORVASTATIN CALCIUM 40 MG: 40 TABLET, FILM COATED ORAL at 21:19

## 2021-06-11 RX ADMIN — HEPARIN SODIUM 5000 UNITS: 5000 INJECTION INTRAVENOUS; SUBCUTANEOUS at 05:21

## 2021-06-11 RX ADMIN — ASPIRIN 81 MG: 81 TABLET, COATED ORAL at 08:55

## 2021-06-11 RX ADMIN — TAMSULOSIN HYDROCHLORIDE 0.4 MG: 0.4 CAPSULE ORAL at 08:55

## 2021-06-11 RX ADMIN — AMLODIPINE BESYLATE 10 MG: 10 TABLET ORAL at 08:55

## 2021-06-11 RX ADMIN — METOPROLOL TARTRATE 25 MG: 25 TABLET, FILM COATED ORAL at 21:19

## 2021-06-11 RX ADMIN — INSULIN LISPRO 1 UNITS: 100 INJECTION, SOLUTION INTRAVENOUS; SUBCUTANEOUS at 17:24

## 2021-06-11 RX ADMIN — ZOLPIDEM TARTRATE 5 MG: 5 TABLET ORAL at 21:20

## 2021-06-11 ASSESSMENT — ENCOUNTER SYMPTOMS
ALLERGIC/IMMUNOLOGIC NEGATIVE: 1
RESPIRATORY NEGATIVE: 1
EYES NEGATIVE: 1
GASTROINTESTINAL NEGATIVE: 1

## 2021-06-11 ASSESSMENT — PAIN DESCRIPTION - LOCATION
LOCATION: SCROTUM

## 2021-06-11 ASSESSMENT — PAIN SCALES - GENERAL
PAINLEVEL_OUTOF10: 7
PAINLEVEL_OUTOF10: 5
PAINLEVEL_OUTOF10: 0
PAINLEVEL_OUTOF10: 7
PAINLEVEL_OUTOF10: 7
PAINLEVEL_OUTOF10: 0
PAINLEVEL_OUTOF10: 7

## 2021-06-11 ASSESSMENT — PAIN - FUNCTIONAL ASSESSMENT
PAIN_FUNCTIONAL_ASSESSMENT: ACTIVITIES ARE NOT PREVENTED

## 2021-06-11 ASSESSMENT — PAIN DESCRIPTION - ONSET
ONSET: ON-GOING

## 2021-06-11 ASSESSMENT — PAIN DESCRIPTION - FREQUENCY
FREQUENCY: CONTINUOUS

## 2021-06-11 ASSESSMENT — PAIN DESCRIPTION - DESCRIPTORS
DESCRIPTORS: THROBBING
DESCRIPTORS: ACHING
DESCRIPTORS: THROBBING

## 2021-06-11 ASSESSMENT — PAIN DESCRIPTION - PROGRESSION
CLINICAL_PROGRESSION: NOT CHANGED

## 2021-06-11 ASSESSMENT — PAIN DESCRIPTION - PAIN TYPE
TYPE: CHRONIC PAIN
TYPE: CHRONIC PAIN;SURGICAL PAIN

## 2021-06-11 ASSESSMENT — PAIN DESCRIPTION - ORIENTATION
ORIENTATION: RIGHT;LEFT;MID

## 2021-06-11 NOTE — PROGRESS NOTES
CKD stage 3 a a3- cr stable - will have to see how much  he can recover - as his kidney  Went  through a lot along with his body also underlying ND does not help to recover  kidney much  2. DM/NS. Skin / soft tissue infection   3. Met acidosis from CARMEN   4. HTN manual BP - he is temp off RAAS inh     Recommendation/Plan  :     1. Cr / k stable  2. From renal standpoint he can be d/c   3. He is with IV abx   4. So remains in risk for CARMEN  5. Follow clinically  6. Good BS control   7. Low slat   8. DASH diet  9. If he gets d/c - CMP in 1 wk  10.  F/u with em in 2 wks       Mendel Hermann, MD MD

## 2021-06-11 NOTE — PROGRESS NOTES
(Oral)   Resp 16   Ht 5' 9\" (1.753 m)   Wt 270 lb (122.5 kg)   SpO2 98%   BMI 39.87 kg/m²   General appearance: No distress,   Respiratory:  good air entry , no Rales , No wheezing, or rhonchi,  Cardiovascular: RRR, with normal S1/S2 . Abdomen : Soft, non-tender, non-distended  , normal bowel sounds. Legs : No edema bilaterally. No DVT signs ,    Neurologic:  Alert and oriented ,        Labs:   Recent Labs     06/08/21  0445 06/09/21  0530 06/10/21  0815   WBC 4.6 5.5 6.2   HGB 8.0* 8.8* 8.4*   HCT 25.1* 26.7* 26.6*    330 330     Recent Labs     06/09/21  0530 06/09/21  1530 06/10/21  0815    134* 137   K 5.6* 4.9 5.0    105 107   CO2 19* 20* 19*   BUN 42* 36* 42*   CREATININE 2.5* 2.3* 2.3*   CALCIUM 8.3 8.3 8.6   PHOS  --   --  4.6     Recent Labs     06/08/21  0445 06/10/21  0815   AST 12* 9*   ALT 8* 7*   BILITOT 0.2 0.2   ALKPHOS 62 60     No results for input(s): INR in the last 72 hours. No results for input(s): María [place] in the last 72 hours. Assessment/Plan:  Active Hospital Problems    Diagnosis Date Noted    Right groin wound [S31.109A] 06/09/2021    Ulcer of right groin with fat layer exposed ([address] [L98.492]     Syncope and collapse [R55] 06/05/2021   DM II uncontrolled   ARF on CKD   Depression   Recent h/o Irene abscess s/p debridement on IV Vancomycin   Elevated troponin       Assessment/Plan:   Csm. . monitor renal function   , on IV ABX . Ian Rich pt already has PICC line   Tele : no event - no fever - on room air   BP fluctuating   Off  IVF NACL. Ian Rich but   renal function worse today as creatinine 2.5 , nephro input noted   Cont IV ABX . Ian Rich ID consult . ..  .. monitor wound c/s . Ian Rich   Culture Abnormal  06/08/2021  4:01 PM [address] Moderate growth Sensitivity to follow    Culture Abnormal  06/08/2021  4:01 PM [address] [facility]   ENTEROBACTER CLOACAE COMPLEX Moderate growth Sensitivity to follow      ABD /groain CT : no abscess   surg consult /wound care input noted    stress test  : cardiolite mild lat wall ischemia ef 48%  And 2 D echo on 5/13/20221 :  Left ventricular systolic function is normal.   Ejection fraction is visually estimated at 55-60%. Mild left ventricular hypertrophy. Mildly dilated right ventricle. RVSP of 20 mmHG   Sclerotic, but non-stenotic aortic valve. No evidence of any pericardial effusion. Glucose control, insulin coverage   Pain control   Home meds , reviewed and resumed as appropriate   Symptoms releif/Pain control  DVT Prophylaxis   Diet: ADULT DIET; Regular; 5 carb choices (75 gm/meal)  Adult Oral Nutrition Supplement; Wound Healing Oral Supplement  Adult Oral Nutrition Supplement; Diabetic Oral Supplement  Code Status: Full Code          Treatment progress and plan was d/w pt/family .     Jodi Remy MD

## 2021-06-11 NOTE — PLAN OF CARE
Nutrition Problem #1: Inadequate protein-energy intake  Intervention: Food and/or Nutrient Delivery: Continue Current Diet, Modify Oral Nutrition Supplement  Nutritional Goals: Pt will consume at least 50% of meals and supplements

## 2021-06-11 NOTE — PROGRESS NOTES
Comprehensive Nutrition Assessment    Type and Reason for Visit:  Reassess    Nutrition Recommendations/Plan:   Continue current carb controlled diet   Will continue to offer diabetic oral nutrition supplement  Discontinue wound healing oral nutrition supplement per patient request-dislikes  Will continue to follow up during stay     Nutrition Assessment:  Tolerating diet, remains on carb controlled diet with meal intake varying %. Prefers small breakfast meals but larger lunch and supper. Willing to drink glucerna but dislikes luisito wound healing supplement. Will follow at moderate nutrition risk at this time, improving intake. Has been provided with diet ed on past admits. Will continue to reinforce meal plan during stay as patient accepts. Malnutrition Assessment:  Malnutrition Status: At risk for malnutrition (Comment)    Context:  Acute Illness       Estimated Daily Nutrient Needs:  Energy (kcal):  1320-4755 (1.1-1.2 stress factor, [facility]); Weight Used for Energy Requirements:  Current     Protein (g):   (1.2-1.5 g/kg); Weight Used for Protein Requirements:  Ideal        Fluid (ml/day):  ~2400, or fluids mgt per physician; Method Used for Fluid Requirements:  1 ml/kcal      Nutrition Related Findings:  no new weight yet, POCT glucose > 200 mg/dL, basal/bolus insulin weight based doses, HbA1c 8.6%, sleeping on visit did awaken briefly for questions      Wounds:  Multiple, Wound Vac, Diabetic Ulcer, Surgical Incision       Current Nutrition Therapies:    ADULT DIET; Regular; 5 carb choices (75 gm/meal)  Adult Oral Nutrition Supplement;  Wound Healing Oral Supplement  Adult Oral Nutrition Supplement; Diabetic Oral Supplement    Anthropometric Measures:  · Height: 5' 9\" (175.3 cm)  · Current Body Weight: 270 lb 1 oz (122.5 kg)   · Admission Body Weight:  (stated)    · Usual Body Weight: 274 lb 11.1 oz (124.6 kg) (3/23/21 per encounters)     · Ideal Body Weight: 160 lbs; % Ideal Body Weight 168.8 %   · BMI: 39.9  · Adjusted Body Weight:  ; No Adjustment (toe amputation, 2019)   · BMI Categories: Obese Class 2 (BMI 35.0 -39.9)       Nutrition Diagnosis:   · Inadequate protein-energy intake related to increase demand for energy/nutrients as evidenced by wounds      Nutrition Interventions:   Food and/or Nutrient Delivery:  Continue Current Diet, Modify Oral Nutrition Supplement  Nutrition Education/Counseling:  Education not appropriate (c/o pain)   Coordination of Nutrition Care:  Continue to monitor while inpatient    Goals:  Pt will consume at least 50% of meals and supplements       Nutrition Monitoring and Evaluation:   Behavioral-Environmental Outcomes:  None Identified   Food/Nutrient Intake Outcomes:  Diet Advancement/Tolerance, Food and Nutrient Intake, Supplement Intake  Physical Signs/Symptoms Outcomes:  Biochemical Data, Meal Time Behavior, Skin, Weight     Discharge Planning:    Continue current diet, Recommend pursue outpatient diabetes education     Electronically signed by Stewart Chavez RD, LD on 6/11/21 at 10:13 AM EDT    Contact: [phone]

## 2021-06-11 NOTE — PROGRESS NOTES
General Surgery- [facility] Day: 7    Chief Complaint on Admission: syncope      Subjective:     Denies complaints. No F/C. Had dressing changed     ROS:  Review of Systems   Constitutional: Negative for chills and fever. HENT: Negative. Eyes: Negative. Respiratory: Negative. Gastrointestinal: Negative. Endocrine: Negative. Genitourinary: Negative for difficulty urinating, discharge and scrotal swelling. Musculoskeletal: Negative. Skin: Positive for wound. Allergic/Immunologic: Negative. Neurological: Positive for syncope. Hematological: Negative. Psychiatric/Behavioral: Negative. Allergies  Adhesive tape, Doxycycline, and Reglan [metoclopramide]          Diagnosis Date    Abscess 2010    scrotal    Acute renal failure (ARF) ([address] 8/4/2019    Anxiety associated with depression     Anxiety associated with depression     Back pain 7/2/2012    Chest pain 5/1/2013    Diabetic nephropathy ([address]     Diabetic neuropathy ([address] 12/22/2011    Diabetic ulcer of left midfoot associated with type 2 diabetes mellitus, with fat layer exposed ([address] 7/18/2017    Diverticulosis     C scope + Dr. Shashank García DM (diabetes mellitus), type 2 ([address] 2002    DR. 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 Shashank García Necrotizing fasciitis [facility])     Nose fracture 1988    Panic attacks     Pericarditis 2003    Hospitalized with s/p heart cath normal    Peripheral autonomic neuropathy due to diabetes mellitus ([address]     Axonal EMG- [facility], March 2011    Sebaceous cyst 9/1/2011    URI (upper respiratory infection) 2/27/2012    WD-Wound, surgical, infected, initial encounter 12/8/2017    Wrist fracture 1986       Objective:     Vitals:    06/11/21 0517   BP: (!) 149/86   Pulse: 69   Resp: 16   Temp: 98 °F (36.7 °C)   SpO2: 95%       TEMPERATURE:  Current -Temp: 98 °F (36.7 °C); Max - Temp  Av.9 °F (36.6 °C)  Min: 97.5 °F (36.4 °C)  Max: 98.3 °F (36.8 °C)    No intake/output data recorded. I/O last 3 completed shifts: In: 56 [P.O.:480; I.V.:10]  Out: 1950 [Urine:1950]      Physical Exam:  Physical Exam  Constitutional:       General: He is not in acute distress. Appearance: He is not ill-appearing, toxic-appearing or diaphoretic. HENT:      Head: Normocephalic and atraumatic. Right Ear: External ear normal.      Left Ear: External ear normal.      Nose: Nose normal.   Eyes:      General:         Right eye: No discharge. Left eye: No discharge. Extraocular Movements: Extraocular movements intact. Cardiovascular:      Rate and Rhythm: Normal rate. Pulmonary:      Effort: Pulmonary effort is normal.   Abdominal:      Palpations: Abdomen is soft. Tenderness: There is no abdominal tenderness. Genitourinary:     Comments: Groin wound with packing in place, beefy red base without drainage or odor. Some minimal yellow tissue sloughing present. Musculoskeletal:         General: No swelling. Cervical back: Normal range of motion. Skin:     General: Skin is warm. Neurological:      General: No focal deficit present. Mental Status: He is alert.            Scheduled Meds:   meropenem  1,000 mg Intravenous Q8H    fluconazole  200 mg Oral Weekly    ondansetron  4 mg Intravenous Once    amLODIPine  10 mg Oral Daily    metoprolol tartrate  25 mg Oral BID    atorvastatin  40 mg Oral Nightly    collagenase   Topical Daily    miconazole   Topical BID    aspirin  81 mg Oral Daily    tamsulosin  0.4 mg Oral Daily    sertraline  100 mg Oral Daily    sodium chloride flush  5-40 mL Intravenous 2 times per day    heparin (porcine)  5,000 Units Subcutaneous 3 times per day    insulin glargine  0.15 Units/kg Subcutaneous Nightly    insulin lispro  0.05 Units/kg Subcutaneous TID WC    insulin lispro  0-6 Units Subcutaneous TID WC    insulin lispro  0-3 Units Subcutaneous Nightly    guaiFENesin  1,200 mg Oral BID     ContinuousInfusions:   dextrose      sodium chloride       PRN Meds:ondansetron, oxyCODONE-acetaminophen, glucose, dextrose, glucagon (rDNA), dextrose, sodium chloride flush, sodium chloride, potassium chloride **OR** potassium alternative oral replacement **OR** potassium chloride, magnesium sulfate, promethazine **OR** ondansetron, polyethylene glycol, fleet, acetaminophen **OR** acetaminophen, zolpidem, guaiFENesin-dextromethorphan, benzonatate, calcium carbonate, morphine      Labs/Imaging Results:   Lab Results   Component Value Date    WBC 5.2 06/11/2021    HGB 8.2 (L) 06/11/2021    HCT 24.0 (L) 06/11/2021    MCV 86.6 06/11/2021     06/11/2021     Lab Results   Component Value Date     06/11/2021    K 4.8 06/11/2021     06/11/2021    CO2 21 06/11/2021    BUN 41 (H) 06/11/2021    CREATININE 2.3 (H) 06/11/2021    GLUCOSE 208 (H) 06/11/2021    CALCIUM 8.3 06/11/2021    PROT 5.9 (L) 06/11/2021    LABALBU 2.9 (L) 06/11/2021    BILITOT 0.1 06/11/2021    ALKPHOS 66 06/11/2021    AST 11 (L) 06/11/2021    ALT 9 (L) 06/11/2021    LABGLOM 31 (L) 06/11/2021    GFRAA 37 (L) 06/11/2021     CT A/P:  Redemonstration of debridement involving the rightward aspect of the scrotum   and the perineum.       The amount of fat stranding adjacent to the wound appears increased.    However, the gas and fluid collection seen previously within the right   scrotum is no longer detected.  The bubbles of gas seen previously tracking   from the wound also no longer detected.       No gas/fluid collections are identified on the current examination to suggest   abscess formation.       Scrotal skin thickening, especially the right, increased when compared to the   previous exam.       Small left hydrocele, decreased when compared to the previous exam.         Assessment:       54 y/o M with syncope, history of tolu gangrene s/p excisional debridement x two    Plan:       -Continue daily dressing changes and santyl to sloughing areas. Wound vac had been tried in past; however, pt had difficulty maintaining seal while at home. Doubtful that having vac during this hospital stay would provide any significant benefit. Therefore continue with current regimen. -ID consult reviewed. Plans noted for rule out pseudomonal and fungal infection. Noted on diflucan and meropenem. Inflammatory markers trending down.     -Plan d/w pt at bedside. -OK to be d/c'd from General Surgery standpoint once medical issues resolved. Follow up with me as outpatient in 1-2 weeks. Call with any questions, concerns, or issues whatsoever.          Electronically signed by Irineo Gregg II, MD on 6/11/2021 at 7:41 AM

## 2021-06-12 LAB
ALBUMIN SERPL-MCNC: 2.7 GM/DL (ref 3.4–5)
ALP BLD-CCNC: 62 IU/L (ref 40–129)
ALT SERPL-CCNC: 9 U/L (ref 10–40)
ANION GAP SERPL CALCULATED.3IONS-SCNC: 7 MMOL/L (ref 4–16)
AST SERPL-CCNC: 11 IU/L (ref 15–37)
BILIRUB SERPL-MCNC: 0.1 MG/DL (ref 0–1)
BUN BLDV-MCNC: 37 MG/DL (ref 6–23)
CALCIUM SERPL-MCNC: 8.1 MG/DL (ref 8.3–10.6)
CHLORIDE BLD-SCNC: 109 MMOL/L (ref 99–110)
CO2: 19 MMOL/L (ref 21–32)
CREAT SERPL-MCNC: 2 MG/DL (ref 0.9–1.3)
GFR AFRICAN AMERICAN: 44 ML/MIN/1.73M2
GFR NON-AFRICAN AMERICAN: 36 ML/MIN/1.73M2
GLUCOSE BLD-MCNC: 172 MG/DL (ref 70–99)
GLUCOSE BLD-MCNC: 187 MG/DL (ref 70–99)
GLUCOSE BLD-MCNC: 200 MG/DL (ref 70–99)
GLUCOSE BLD-MCNC: 244 MG/DL (ref 70–99)
GLUCOSE BLD-MCNC: 263 MG/DL (ref 70–99)
HCT VFR BLD CALC: 25.6 % (ref 42–52)
HEMOGLOBIN: 8.1 GM/DL (ref 13.5–18)
MCH RBC QN AUTO: 28.6 PG (ref 27–31)
MCHC RBC AUTO-ENTMCNC: 31.6 % (ref 32–36)
MCV RBC AUTO: 90.5 FL (ref 78–100)
PDW BLD-RTO: 14.1 % (ref 11.7–14.9)
PLATELET # BLD: 311 K/CU MM (ref 140–440)
PMV BLD AUTO: 9.5 FL (ref 7.5–11.1)
POTASSIUM SERPL-SCNC: 5.1 MMOL/L (ref 3.5–5.1)
RBC # BLD: 2.83 M/CU MM (ref 4.6–6.2)
SODIUM BLD-SCNC: 135 MMOL/L (ref 135–145)
TOTAL PROTEIN: 5.8 GM/DL (ref 6.4–8.2)
WBC # BLD: 7.2 K/CU MM (ref 4–10.5)

## 2021-06-12 PROCEDURE — 85027 COMPLETE CBC AUTOMATED: CPT

## 2021-06-12 PROCEDURE — 6360000002 HC RX W HCPCS: Performed by: FAMILY MEDICINE

## 2021-06-12 PROCEDURE — 80053 COMPREHEN METABOLIC PANEL: CPT

## 2021-06-12 PROCEDURE — 2500000003 HC RX 250 WO HCPCS: Performed by: FAMILY MEDICINE

## 2021-06-12 PROCEDURE — 94761 N-INVAS EAR/PLS OXIMETRY MLT: CPT

## 2021-06-12 PROCEDURE — 82962 GLUCOSE BLOOD TEST: CPT

## 2021-06-12 PROCEDURE — 6360000002 HC RX W HCPCS: Performed by: INTERNAL MEDICINE

## 2021-06-12 PROCEDURE — 6370000000 HC RX 637 (ALT 250 FOR IP): Performed by: NURSE PRACTITIONER

## 2021-06-12 PROCEDURE — 6370000000 HC RX 637 (ALT 250 FOR IP): Performed by: INTERNAL MEDICINE

## 2021-06-12 PROCEDURE — 6370000000 HC RX 637 (ALT 250 FOR IP): Performed by: FAMILY MEDICINE

## 2021-06-12 PROCEDURE — 2580000003 HC RX 258: Performed by: INTERNAL MEDICINE

## 2021-06-12 PROCEDURE — 94150 VITAL CAPACITY TEST: CPT

## 2021-06-12 PROCEDURE — 2580000003 HC RX 258: Performed by: FAMILY MEDICINE

## 2021-06-12 PROCEDURE — 36415 COLL VENOUS BLD VENIPUNCTURE: CPT

## 2021-06-12 PROCEDURE — APPSS60 APP SPLIT SHARED TIME 46-60 MINUTES: Performed by: NURSE PRACTITIONER

## 2021-06-12 PROCEDURE — 1200000000 HC SEMI PRIVATE

## 2021-06-12 PROCEDURE — 99232 SBSQ HOSP IP/OBS MODERATE 35: CPT | Performed by: INTERNAL MEDICINE

## 2021-06-12 RX ORDER — SODIUM BICARBONATE 650 MG/1
650 TABLET ORAL 4 TIMES DAILY
Status: DISCONTINUED | OUTPATIENT
Start: 2021-06-12 | End: 2021-06-14

## 2021-06-12 RX ORDER — INSULIN GLARGINE 100 [IU]/ML
25 INJECTION, SOLUTION SUBCUTANEOUS NIGHTLY
Status: DISCONTINUED | OUTPATIENT
Start: 2021-06-12 | End: 2021-06-15 | Stop reason: HOSPADM

## 2021-06-12 RX ADMIN — INSULIN LISPRO 1 UNITS: 100 INJECTION, SOLUTION INTRAVENOUS; SUBCUTANEOUS at 17:38

## 2021-06-12 RX ADMIN — SODIUM CHLORIDE, PRESERVATIVE FREE 10 ML: 5 INJECTION INTRAVENOUS at 21:07

## 2021-06-12 RX ADMIN — ZOLPIDEM TARTRATE 5 MG: 5 TABLET ORAL at 22:33

## 2021-06-12 RX ADMIN — ASPIRIN 81 MG: 81 TABLET, COATED ORAL at 09:28

## 2021-06-12 RX ADMIN — SODIUM CHLORIDE, PRESERVATIVE FREE 10 ML: 5 INJECTION INTRAVENOUS at 09:28

## 2021-06-12 RX ADMIN — HEPARIN SODIUM 5000 UNITS: 5000 INJECTION INTRAVENOUS; SUBCUTANEOUS at 12:48

## 2021-06-12 RX ADMIN — SODIUM BICARBONATE 650 MG TABLET 650 MG: at 21:06

## 2021-06-12 RX ADMIN — AMLODIPINE BESYLATE 10 MG: 10 TABLET ORAL at 09:28

## 2021-06-12 RX ADMIN — MEROPENEM 1000 MG: 1 INJECTION, POWDER, FOR SOLUTION INTRAVENOUS at 21:07

## 2021-06-12 RX ADMIN — SODIUM BICARBONATE 650 MG TABLET 650 MG: at 12:49

## 2021-06-12 RX ADMIN — HEPARIN SODIUM 5000 UNITS: 5000 INJECTION INTRAVENOUS; SUBCUTANEOUS at 21:09

## 2021-06-12 RX ADMIN — MEROPENEM 1000 MG: 1 INJECTION, POWDER, FOR SOLUTION INTRAVENOUS at 05:19

## 2021-06-12 RX ADMIN — MICONAZOLE NITRATE: 2 POWDER TOPICAL at 09:32

## 2021-06-12 RX ADMIN — HEPARIN SODIUM 5000 UNITS: 5000 INJECTION INTRAVENOUS; SUBCUTANEOUS at 05:19

## 2021-06-12 RX ADMIN — SERTRALINE HYDROCHLORIDE 100 MG: 100 TABLET ORAL at 09:28

## 2021-06-12 RX ADMIN — COLLAGENASE SANTYL: 250 OINTMENT TOPICAL at 09:32

## 2021-06-12 RX ADMIN — PROMETHAZINE HYDROCHLORIDE 12.5 MG: 25 TABLET ORAL at 16:05

## 2021-06-12 RX ADMIN — ATORVASTATIN CALCIUM 40 MG: 40 TABLET, FILM COATED ORAL at 21:06

## 2021-06-12 RX ADMIN — OXYCODONE HYDROCHLORIDE AND ACETAMINOPHEN 1 TABLET: 5; 325 TABLET ORAL at 09:28

## 2021-06-12 RX ADMIN — MEROPENEM 1000 MG: 1 INJECTION, POWDER, FOR SOLUTION INTRAVENOUS at 12:49

## 2021-06-12 RX ADMIN — OXYCODONE HYDROCHLORIDE AND ACETAMINOPHEN 1 TABLET: 5; 325 TABLET ORAL at 16:05

## 2021-06-12 RX ADMIN — TAMSULOSIN HYDROCHLORIDE 0.4 MG: 0.4 CAPSULE ORAL at 09:29

## 2021-06-12 RX ADMIN — OXYCODONE HYDROCHLORIDE AND ACETAMINOPHEN 1 TABLET: 5; 325 TABLET ORAL at 02:45

## 2021-06-12 RX ADMIN — INSULIN GLARGINE 25 UNITS: 100 INJECTION, SOLUTION SUBCUTANEOUS at 21:08

## 2021-06-12 RX ADMIN — INSULIN LISPRO 2 UNITS: 100 INJECTION, SOLUTION INTRAVENOUS; SUBCUTANEOUS at 09:27

## 2021-06-12 RX ADMIN — Medication 2 MG: at 22:33

## 2021-06-12 RX ADMIN — INSULIN LISPRO 2 UNITS: 100 INJECTION, SOLUTION INTRAVENOUS; SUBCUTANEOUS at 12:48

## 2021-06-12 RX ADMIN — METOPROLOL TARTRATE 25 MG: 25 TABLET, FILM COATED ORAL at 21:06

## 2021-06-12 RX ADMIN — MICONAZOLE NITRATE: 2 POWDER TOPICAL at 21:07

## 2021-06-12 RX ADMIN — GUAIFENESIN 1200 MG: 600 TABLET, EXTENDED RELEASE ORAL at 09:28

## 2021-06-12 RX ADMIN — SODIUM BICARBONATE 650 MG TABLET 650 MG: at 16:05

## 2021-06-12 RX ADMIN — METOPROLOL TARTRATE 25 MG: 25 TABLET, FILM COATED ORAL at 09:29

## 2021-06-12 ASSESSMENT — PAIN SCALES - GENERAL
PAINLEVEL_OUTOF10: 7
PAINLEVEL_OUTOF10: 0
PAINLEVEL_OUTOF10: 0
PAINLEVEL_OUTOF10: 7
PAINLEVEL_OUTOF10: 7
PAINLEVEL_OUTOF10: 0
PAINLEVEL_OUTOF10: 7

## 2021-06-12 ASSESSMENT — PAIN DESCRIPTION - PROGRESSION
CLINICAL_PROGRESSION: NOT CHANGED

## 2021-06-12 ASSESSMENT — PAIN DESCRIPTION - FREQUENCY
FREQUENCY: CONTINUOUS
FREQUENCY: CONTINUOUS

## 2021-06-12 ASSESSMENT — PAIN DESCRIPTION - LOCATION
LOCATION: FOOT
LOCATION: SCROTUM
LOCATION: SCROTUM

## 2021-06-12 ASSESSMENT — PAIN DESCRIPTION - ORIENTATION
ORIENTATION: RIGHT;LEFT
ORIENTATION: RIGHT;LEFT
ORIENTATION: RIGHT

## 2021-06-12 ASSESSMENT — PAIN DESCRIPTION - DESCRIPTORS
DESCRIPTORS: ACHING
DESCRIPTORS: ACHING

## 2021-06-12 ASSESSMENT — PAIN DESCRIPTION - ONSET: ONSET: ON-GOING

## 2021-06-12 ASSESSMENT — PAIN DESCRIPTION - PAIN TYPE
TYPE: CHRONIC PAIN
TYPE: CHRONIC PAIN;SURGICAL PAIN

## 2021-06-12 ASSESSMENT — PAIN - FUNCTIONAL ASSESSMENT: PAIN_FUNCTIONAL_ASSESSMENT: ACTIVITIES ARE NOT PREVENTED

## 2021-06-12 NOTE — PROGRESS NOTES
Attending Progress Note      PCP: Constantino Ortega MD      Patient: Jacky Diallo   Gender: male  : 1975   Age: 55 y.o. MRN: [id number]  Room  : [address] [place] [address]      Date of Admission: 2021    Chief Complaint   Patient presents with    Wound Check     Open wound to scrotum. Had a coughing fit at home and was incontinent and got stool in his wound           Subjective:  Pain  controlled . ... comfortable           Medications:  Reviewed  Infusion Medications    dextrose      sodium chloride       Scheduled Medications    meropenem  1,000 mg Intravenous Q8H    fluconazole  200 mg Oral Weekly    ondansetron  4 mg Intravenous Once    amLODIPine  10 mg Oral Daily    metoprolol tartrate  25 mg Oral BID    atorvastatin  40 mg Oral Nightly    collagenase   Topical Daily    miconazole   Topical BID    aspirin  81 mg Oral Daily    tamsulosin  0.4 mg Oral Daily    sertraline  100 mg Oral Daily    sodium chloride flush  5-40 mL Intravenous 2 times per day    heparin (porcine)  5,000 Units Subcutaneous 3 times per day    insulin glargine  0.15 Units/kg Subcutaneous Nightly    insulin lispro  0.05 Units/kg Subcutaneous TID WC    insulin lispro  0-6 Units Subcutaneous TID WC    insulin lispro  0-3 Units Subcutaneous Nightly    guaiFENesin  1,200 mg Oral BID     PRN Meds: ondansetron, oxyCODONE-acetaminophen, glucose, dextrose, glucagon (rDNA), dextrose, sodium chloride flush, sodium chloride, potassium chloride **OR** potassium alternative oral replacement **OR** potassium chloride, magnesium sulfate, promethazine **OR** ondansetron, polyethylene glycol, fleet, acetaminophen **OR** acetaminophen, zolpidem, guaiFENesin-dextromethorphan, benzonatate, calcium carbonate, morphine      Intake/Output Summary (Last 24 hours) at 2021 1019  Last data filed at 2021 0926  Gross per 24 hour   Intake 480 ml   Output 1850 ml   Net -1370 ml       Exam:  BP (!) 141/86   Pulse 70   Temp 97.4 °F (36.3 °C) (Oral)   Resp 18   Ht 5' 9\" (1.753 m)   Wt 270 lb (122.5 kg)   SpO2 98%   BMI 39.87 kg/m²   General appearance: No distress,   Respiratory:  good air entry , no Rales , No wheezing, or rhonchi,  Cardiovascular: RRR, with normal S1/S2 . Abdomen : Soft, non-tender, non-distended  , normal bowel sounds. Legs : No edema bilaterally. No DVT signs ,    Neurologic:  Alert and oriented ,        Labs:   Recent Labs     06/10/21  0815 06/11/21  0609 06/12/21  0754   WBC 6.2 5.2 7.2   HGB 8.4* 8.2* 8.1*   HCT 26.6* 24.0* 25.6*    326 311     Recent Labs     06/10/21  0815 06/11/21  0609 06/12/21  0754    139 135   K 5.0 4.8 5.1    108 109   CO2 19* 21 19*   BUN 42* 41* 37*   CREATININE 2.3* 2.3* 2.0*   CALCIUM 8.6 8.3 8.1*   PHOS 4.6 5.7*  --      Recent Labs     06/10/21  0815 06/11/21  0609 06/12/21  0754   AST 9* 11* 11*   ALT 7* 9* 9*   BILITOT 0.2 0.1 0.1   ALKPHOS 60 66 62     No results for input(s): INR in the last 72 hours. No results for input(s): Renata Barnett in the last 72 hours. Assessment/Plan:  Active Hospital Problems    Diagnosis Date Noted    Right groin wound [S31.109A] 06/09/2021    Ulcer of right groin with fat layer exposed ([address] [L98.492]     Syncope and collapse [R55] 06/05/2021   DM II uncontrolled   ARF on CKD   Depression   Recent h/o Irene abscess s/p debridement on IV Vancomycin   Elevated troponin       Assessment/Plan:   Csm. . monitor renal function . .. nephro input noted . . [place] [id number] when renal function improved sig or back to base line   , on IV ABX . Thea Richardson pt already has PICC line   Tele : no event - no fever - on room air   BP fluctuating   Off  IVF NACL. Thea [place] but   renal function worse today as creatinine 2.5 , nephro input noted   Cont IV ABX . Thea [place] ID consult . ..  .. monitor wound c/s . Kent [place]   Culture Abnormal  06/08/2021  4:01 PM [address] Moderate growth Sensitivity to follow    Culture Abnormal  06/08/2021  4:01 PM [facility]   ENTEROBACTER CLOACAE COMPLEX Moderate growth Sensitivity to follow      ABD /groain CT : no abscess   surg consult /wound care input noted    stress test  : cardiolite mild lat wall ischemia ef 48%  And 2 D echo on 5/13/20221 :  Left ventricular systolic function is normal.   Ejection fraction is visually estimated at 55-60%. Mild left ventricular hypertrophy. Mildly dilated right ventricle. RVSP of 20 mmHG   Sclerotic, but non-stenotic aortic valve. No evidence of any pericardial effusion. Glucose control, insulin coverage   Pain control   Home meds , reviewed and resumed as appropriate   Symptoms releif/Pain control  DVT Prophylaxis   Diet: ADULT DIET; Regular; 5 carb choices (75 gm/meal)  Adult Oral Nutrition Supplement; Diabetic Oral Supplement  Code Status: Full Code          Treatment progress and plan was d/w pt/family .     Ave Vinson MD

## 2021-06-12 NOTE — PROGRESS NOTES
Weekly    ondansetron  4 mg Intravenous Once    amLODIPine  10 mg Oral Daily    metoprolol tartrate  25 mg Oral BID    atorvastatin  40 mg Oral Nightly    collagenase   Topical Daily    miconazole   Topical BID    aspirin  81 mg Oral Daily    tamsulosin  0.4 mg Oral Daily    sertraline  100 mg Oral Daily    sodium chloride flush  5-40 mL Intravenous 2 times per day    heparin (porcine)  5,000 Units Subcutaneous 3 times per day    insulin glargine  0.15 Units/kg Subcutaneous Nightly    insulin lispro  0.05 Units/kg Subcutaneous TID WC    insulin lispro  0-6 Units Subcutaneous TID WC    insulin lispro  0-3 Units Subcutaneous Nightly    guaiFENesin  1,200 mg Oral BID     PRN Meds: ondansetron, oxyCODONE-acetaminophen, glucose, dextrose, glucagon (rDNA), dextrose, sodium chloride flush, sodium chloride, potassium chloride **OR** potassium alternative oral replacement **OR** potassium chloride, magnesium sulfate, promethazine **OR** ondansetron, polyethylene glycol, fleet, acetaminophen **OR** acetaminophen, zolpidem, guaiFENesin-dextromethorphan, benzonatate, calcium carbonate, morphine    Assessment/Plan:   1. History of tolu gangrene s/p excisional debridement x two. Continue Meropenem and Diflucan. 2.  CARMEN on CKD. Cret is improving. 2.0 today. 3.  Metabolic acidosis. Resolved. 4.  HTN. BP is stable. 5.  Poorly controlled DM. Will increase the dose on Lantus. Also continue SSI coverage.         Army Narciso MD,   6/12/2021 10:21 AM

## 2021-06-12 NOTE — PROGRESS NOTES
Cardiology Progress Note     Today's Plan: We will sign off    Admit Date:  6/5/2021    Consult reason/ Seen today for: Syncope    Subjective and  Overnight Events: Patient denies any chest pain or shortness of breath. Reports mild dizziness when ambulating. Assessment / Plan / Recommendation:     1. Syncope- + orthostatic blood pressures-- at home on flomax/hygroton/ lotrel - hold antihypertensives/ diuretic- recommend to keep hydrated -question for elevated junctional rhythm after strips reviewed patient and sinus rhythm. 2. Elevated troponin- ? Type 2 rise in the setting of anemia/CKD Cici scan done- mild lateral wall ischemia- optimize medical therapy - added bb- metoprolol- continue with ASA and lipitor. Can re-evaluate for LHC as outpatient once infection clears. 3. H/o fornier gangrene- on ATB- continues with groin pain  4. HTN- presented with low bp and syncope-give IV fluid- antihypertensives placed on hold- bp improved. Continue with Norvasc 10 mg daily, Lopressor 5 mg twice daily. 5. DVT prophylaxis if not contraindicated. 6. Will sign off       History of Presenting Illness:    Chief complain on admission : 55 y. o.year old who is admitted for  Chief Complaint   Patient presents with    Wound Check     Open wound to scrotum.  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, Hyperlipidemia LDL goal < 100, Hypertension, Internal hemorrhoid, Necrotizing chloride, potassium chloride **OR** potassium alternative oral replacement **OR** potassium chloride, magnesium sulfate, promethazine **OR** ondansetron, polyethylene glycol, fleet, acetaminophen **OR** acetaminophen, zolpidem, guaiFENesin-dextromethorphan, benzonatate, calcium carbonate, morphine    Lab Data:  CBC:   Recent Labs     06/10/21  0815 06/11/21  0609 06/12/21  0754   WBC 6.2 5.2 7.2   HGB 8.4* 8.2* 8.1*   HCT 26.6* 24.0* 25.6*   MCV 89.0 86.6 90.5    326 311     BMP:   Recent Labs     06/10/21  0815 06/11/21  0609 06/12/21  0754    139 135   K 5.0 4.8 5.1    108 109   CO2 19* 21 19*   PHOS 4.6 5.7*  --    BUN 42* 41* 37*   CREATININE 2.3* 2.3* 2.0*     PT/INR: No results for input(s): PROTIME, INR in the last 72 hours. BNP:  No results for input(s): PROBNP in the last 72 hours. TROPONIN: No results for input(s): TROPONINT in the last 72 hours. ECHO : 5/13/21  Left ventricular systolic function is normal.   Ejection fraction is visually estimated at 55-60%. Mild left ventricular hypertrophy. Mildly dilated right ventricle. RVSP of 20 mmHG   Sclerotic, but non-stenotic aortic valve. No evidence of any pericardial effusion    NM Myocardial Spect6/8/21  Small sized defect of mild severity which is mixed involving inferalateral    wall of myocardium. Abnormal Lexiscan nuclear scintigraphic study suggestive    of abnormal myocardial perfusion. Gated images demonstrate abnormal left    ventricular systolic function with EF of 41 %. Nuclear scintigraphy    demonstartes inferior wall infarct with mild ischemia of lateral wall         Impression:  Active Problems:    Syncope and collapse    Right groin wound    Ulcer of right groin with fat layer exposed ([address]  Resolved Problems:    * No resolved hospital problems.  *       All labs, medications and tests reviewed by myself, continue all other medications of all above medical condition listed as is except for changes mentioned above.    Thank you   Please call with questions. Electronically signed by Ashley Galan. Tito Trejo, APRN - CNP on 6/12/2021 at 9:51 AM           CARDIOLOGY ATTENDING ADDENDUM    I have seen, spoken to and examined this patient personally, independently of the nurse practitioner. I have reviewed the hospital care given to date and reviewed all pertinent labs and imaging. The plan was developed mutually at the time of the visit with the patient,  NP   and myself. I have spoken with patient, nursing staff and provided written and verbal instructions . The above note has been reviewed and I agree with the assessment, diagnosis, and treatment plan with changes made by me as follows       HPI:  I have reviewed the above HPI  And agree with above   Please review addendum/changes made to note above     Interval history:      No acute overnight events noted. Physical Exam:  General:  Awake, alert, NAD  Head:normal  Eye:normal  Neck:  No JVD   Chest:  Clear to auscultation, respiration easy  Cardiovascular:  s1s2  Abdomen:   nontender  Extremities:  + edema  Pulses; palpable  Neuro: grossly normal      MEDICAL DECISION MAKING;    I agree with the above plan, which was planned by myself and discussed with NP.         Dr. Thomas Mendez MD

## 2021-06-12 NOTE — PROGRESS NOTES
Nephrology Progress Note  6/12/2021 10:48 AM  Subjective: Interval History: Burke Whittington is a 55 y.o. male with improved pain from abscess will monitor. Data:   Scheduled Meds:   meropenem  1,000 mg Intravenous Q8H    fluconazole  200 mg Oral Weekly    ondansetron  4 mg Intravenous Once    amLODIPine  10 mg Oral Daily    metoprolol tartrate  25 mg Oral BID    atorvastatin  40 mg Oral Nightly    collagenase   Topical Daily    miconazole   Topical BID    aspirin  81 mg Oral Daily    tamsulosin  0.4 mg Oral Daily    sertraline  100 mg Oral Daily    sodium chloride flush  5-40 mL Intravenous 2 times per day    heparin (porcine)  5,000 Units Subcutaneous 3 times per day    insulin glargine  0.15 Units/kg Subcutaneous Nightly    insulin lispro  0.05 Units/kg Subcutaneous TID WC    insulin lispro  0-6 Units Subcutaneous TID WC    insulin lispro  0-3 Units Subcutaneous Nightly    guaiFENesin  1,200 mg Oral BID     Continuous Infusions:   dextrose      sodium chloride           CBC   Recent Labs     06/10/21  0815 06/11/21  0609 06/12/21  0754   WBC 6.2 5.2 7.2   HGB 8.4* 8.2* 8.1*   HCT 26.6* 24.0* 25.6*    326 311      BMP   Recent Labs     06/10/21  0815 06/11/21  0609 06/12/21  0754    139 135   K 5.0 4.8 5.1    108 109   CO2 19* 21 19*   PHOS 4.6 5.7*  --    BUN 42* 41* 37*   CREATININE 2.3* 2.3* 2.0*     Hepatic:   Recent Labs     06/10/21  0815 06/11/21  0609 06/12/21  0754   AST 9* 11* 11*   ALT 7* 9* 9*   BILITOT 0.2 0.1 0.1   ALKPHOS 60 66 62     Troponin: No results for input(s): TROPONINI in the last 72 hours. BNP: No results for input(s): BNP in the last 72 hours. Lipids: No results for input(s): CHOL, HDL in the last 72 hours. Invalid input(s): LDLCALCU  ABGs:   Lab Results   Component Value Date    PO2ART 119 06/18/2013    KWL1RYY 47.0 06/18/2013     INR: No results for input(s): INR in the last 72 hours.   Renal Labs  Albumin:    Lab Results Component Value Date    LABALBU 2.7 06/12/2021    LABALBU 48 03/27/2021     Calcium:    Lab Results   Component Value Date    CALCIUM 8.1 06/12/2021     Phosphorus:    Lab Results   Component Value Date    PHOS 5.7 06/11/2021     U/A:    Lab Results   Component Value Date    NITRU NEGATIVE 06/09/2021    COLORU YELLOW 06/09/2021    PHUR 6.5 06/20/2013    WBCUA 1 06/09/2021    RBCUA <1 06/09/2021    MUCUS RARE 06/05/2021    TRICHOMONAS NONE SEEN 06/09/2021    BACTERIA NEGATIVE 06/09/2021    CLARITYU CLEAR 06/09/2021    SPECGRAV 1.014 06/09/2021    UROBILINOGEN NEGATIVE 06/09/2021    BILIRUBINUR NEGATIVE 06/09/2021    BILIRUBINUR neg 06/20/2013    BLOODU SMALL 06/09/2021    GLUCOSEU 1,000 06/20/2013    KETUA NEGATIVE 06/09/2021    AMORPHOUS RARE 03/26/2021     ABG:    Lab Results   Component Value Date    HFI3RNO 47.0 06/18/2013    PO2ART 119 06/18/2013    HDW4RHZ 24.2 06/18/2013    BEART 2 06/18/2013     HgBA1c:    Lab Results   Component Value Date    LABA1C 8.6 06/06/2021     Microalbumen/Creatinine ratio:  No components found for: RUCREAT  TSH:  No results found for: TSH  IRON:  No results found for: IRON  Iron Saturation:  No components found for: PERCENTFE  TIBC:  No results found for: TIBC  FERRITIN:  No results found for: FERRITIN  RPR:  No results found for: RPR  LOVE:    Lab Results   Component Value Date    LOVE None Detected 03/28/2021     24 Hour Urine for Creatinine Clearance:  No components found for: CREAT4, UHRS10, UTV10      Objective:   I/O: 06/11 0701 - 06/12 0700  In: 240 [P.O.:240]  Out: 1850 [Urine:1850]  I/O last 3 completed shifts: In: 240 [P.O.:240]  Out: 1850 [Urine:1850]  I/O this shift:  In: 240 [P.O.:240]  Out: -   Vitals: BP (!) 141/86   Pulse 70   Temp 97.4 °F (36.3 °C) (Oral)   Resp 18   Ht 5' 9\" (1.753 m)   Wt 270 lb (122.5 kg)   SpO2 98%   BMI 39.87 kg/m²  {  General appearance: awake weak  HEENT: Head: Normal, normocephalic, atraumatic.   Neck: supple, symmetrical, trachea midline  Lungs: diminished breath sounds bilaterally  Heart: S1, S2 normal  Abdomen: abnormal findings:  soft nt obese  Extremities: edema trace  Neurologic: Mental status: alertness: alert        Assessment and Plan:      IMP:  Acute renal failure from ATN on CKD 3  2. Type 2 diabetes  3. Metabolic acidosis  4. Hypertension  5. Groin abscess with E. coli and Enterobacter    Plan     #1 creatinine holding at 2.0 will monitor at this time stable urine output  2. Monitor glucose control improved  3. Acidosis slightly better maintain bicarb monitor potassium  4. Blood pressure holding stable  5. Maintain antibiotic therapy  6.   We will monitor           Prince Marine MD, MD

## 2021-06-13 LAB
ALBUMIN SERPL-MCNC: 2.7 GM/DL (ref 3.4–5)
ANION GAP SERPL CALCULATED.3IONS-SCNC: 7 MMOL/L (ref 4–16)
BUN BLDV-MCNC: 35 MG/DL (ref 6–23)
CALCIUM SERPL-MCNC: 8.6 MG/DL (ref 8.3–10.6)
CHLORIDE BLD-SCNC: 108 MMOL/L (ref 99–110)
CO2: 23 MMOL/L (ref 21–32)
CREAT SERPL-MCNC: 1.8 MG/DL (ref 0.9–1.3)
CULTURE: ABNORMAL
GFR AFRICAN AMERICAN: 49 ML/MIN/1.73M2
GFR NON-AFRICAN AMERICAN: 41 ML/MIN/1.73M2
GLUCOSE BLD-MCNC: 197 MG/DL (ref 70–99)
GLUCOSE BLD-MCNC: 198 MG/DL (ref 70–99)
GLUCOSE BLD-MCNC: 199 MG/DL (ref 70–99)
GLUCOSE BLD-MCNC: 215 MG/DL (ref 70–99)
GLUCOSE BLD-MCNC: 246 MG/DL (ref 70–99)
HCT VFR BLD CALC: 25.1 % (ref 42–52)
HEMOGLOBIN: 8.8 GM/DL (ref 13.5–18)
Lab: ABNORMAL
MCH RBC QN AUTO: 29.9 PG (ref 27–31)
MCHC RBC AUTO-ENTMCNC: 35.1 % (ref 32–36)
MCV RBC AUTO: 85.4 FL (ref 78–100)
PDW BLD-RTO: 14.1 % (ref 11.7–14.9)
PHOSPHORUS: 4.2 MG/DL (ref 2.5–4.9)
PLATELET # BLD: 298 K/CU MM (ref 140–440)
PMV BLD AUTO: 9.4 FL (ref 7.5–11.1)
POTASSIUM SERPL-SCNC: 4.7 MMOL/L (ref 3.5–5.1)
PROCALCITONIN: 0.18
RBC # BLD: 2.94 M/CU MM (ref 4.6–6.2)
SODIUM BLD-SCNC: 138 MMOL/L (ref 135–145)
SPECIMEN: ABNORMAL
WBC # BLD: 6.5 K/CU MM (ref 4–10.5)

## 2021-06-13 PROCEDURE — 6360000002 HC RX W HCPCS: Performed by: FAMILY MEDICINE

## 2021-06-13 PROCEDURE — 6370000000 HC RX 637 (ALT 250 FOR IP): Performed by: INTERNAL MEDICINE

## 2021-06-13 PROCEDURE — 94150 VITAL CAPACITY TEST: CPT

## 2021-06-13 PROCEDURE — 99232 SBSQ HOSP IP/OBS MODERATE 35: CPT | Performed by: INTERNAL MEDICINE

## 2021-06-13 PROCEDURE — 6360000002 HC RX W HCPCS: Performed by: INTERNAL MEDICINE

## 2021-06-13 PROCEDURE — 6370000000 HC RX 637 (ALT 250 FOR IP): Performed by: FAMILY MEDICINE

## 2021-06-13 PROCEDURE — 94761 N-INVAS EAR/PLS OXIMETRY MLT: CPT

## 2021-06-13 PROCEDURE — 84145 PROCALCITONIN (PCT): CPT

## 2021-06-13 PROCEDURE — 2580000003 HC RX 258: Performed by: INTERNAL MEDICINE

## 2021-06-13 PROCEDURE — 80069 RENAL FUNCTION PANEL: CPT

## 2021-06-13 PROCEDURE — 36415 COLL VENOUS BLD VENIPUNCTURE: CPT

## 2021-06-13 PROCEDURE — 85027 COMPLETE CBC AUTOMATED: CPT

## 2021-06-13 PROCEDURE — 82962 GLUCOSE BLOOD TEST: CPT

## 2021-06-13 PROCEDURE — 2580000003 HC RX 258: Performed by: FAMILY MEDICINE

## 2021-06-13 PROCEDURE — 6370000000 HC RX 637 (ALT 250 FOR IP): Performed by: NURSE PRACTITIONER

## 2021-06-13 PROCEDURE — 1200000000 HC SEMI PRIVATE

## 2021-06-13 PROCEDURE — 2500000003 HC RX 250 WO HCPCS: Performed by: FAMILY MEDICINE

## 2021-06-13 RX ADMIN — METOPROLOL TARTRATE 25 MG: 25 TABLET, FILM COATED ORAL at 20:40

## 2021-06-13 RX ADMIN — METOPROLOL TARTRATE 25 MG: 25 TABLET, FILM COATED ORAL at 07:53

## 2021-06-13 RX ADMIN — SODIUM CHLORIDE, PRESERVATIVE FREE 10 ML: 5 INJECTION INTRAVENOUS at 07:54

## 2021-06-13 RX ADMIN — INSULIN LISPRO 2 UNITS: 100 INJECTION, SOLUTION INTRAVENOUS; SUBCUTANEOUS at 11:51

## 2021-06-13 RX ADMIN — AMLODIPINE BESYLATE 10 MG: 10 TABLET ORAL at 07:54

## 2021-06-13 RX ADMIN — TAMSULOSIN HYDROCHLORIDE 0.4 MG: 0.4 CAPSULE ORAL at 07:53

## 2021-06-13 RX ADMIN — ONDANSETRON 4 MG: 2 INJECTION INTRAMUSCULAR; INTRAVENOUS at 07:54

## 2021-06-13 RX ADMIN — HEPARIN SODIUM 5000 UNITS: 5000 INJECTION INTRAVENOUS; SUBCUTANEOUS at 05:42

## 2021-06-13 RX ADMIN — COLLAGENASE SANTYL: 250 OINTMENT TOPICAL at 11:55

## 2021-06-13 RX ADMIN — MICONAZOLE NITRATE: 2 POWDER TOPICAL at 11:55

## 2021-06-13 RX ADMIN — INSULIN LISPRO 2 UNITS: 100 INJECTION, SOLUTION INTRAVENOUS; SUBCUTANEOUS at 17:18

## 2021-06-13 RX ADMIN — SODIUM BICARBONATE 650 MG TABLET 650 MG: at 13:19

## 2021-06-13 RX ADMIN — MEROPENEM 1000 MG: 1 INJECTION, POWDER, FOR SOLUTION INTRAVENOUS at 20:40

## 2021-06-13 RX ADMIN — SODIUM BICARBONATE 650 MG TABLET 650 MG: at 20:40

## 2021-06-13 RX ADMIN — ATORVASTATIN CALCIUM 40 MG: 40 TABLET, FILM COATED ORAL at 20:40

## 2021-06-13 RX ADMIN — OXYCODONE HYDROCHLORIDE AND ACETAMINOPHEN 1 TABLET: 5; 325 TABLET ORAL at 20:40

## 2021-06-13 RX ADMIN — SODIUM CHLORIDE, PRESERVATIVE FREE 10 ML: 5 INJECTION INTRAVENOUS at 20:40

## 2021-06-13 RX ADMIN — MEROPENEM 1000 MG: 1 INJECTION, POWDER, FOR SOLUTION INTRAVENOUS at 05:42

## 2021-06-13 RX ADMIN — SODIUM BICARBONATE 650 MG TABLET 650 MG: at 17:18

## 2021-06-13 RX ADMIN — SODIUM BICARBONATE 650 MG TABLET 650 MG: at 07:54

## 2021-06-13 RX ADMIN — HEPARIN SODIUM 5000 UNITS: 5000 INJECTION INTRAVENOUS; SUBCUTANEOUS at 15:16

## 2021-06-13 RX ADMIN — Medication 2 MG: at 17:19

## 2021-06-13 RX ADMIN — SERTRALINE HYDROCHLORIDE 100 MG: 100 TABLET ORAL at 07:53

## 2021-06-13 RX ADMIN — INSULIN GLARGINE 25 UNITS: 100 INJECTION, SOLUTION SUBCUTANEOUS at 20:42

## 2021-06-13 RX ADMIN — OXYCODONE HYDROCHLORIDE AND ACETAMINOPHEN 1 TABLET: 5; 325 TABLET ORAL at 07:54

## 2021-06-13 RX ADMIN — MEROPENEM 1000 MG: 1 INJECTION, POWDER, FOR SOLUTION INTRAVENOUS at 13:19

## 2021-06-13 RX ADMIN — ASPIRIN 81 MG: 81 TABLET, COATED ORAL at 07:54

## 2021-06-13 RX ADMIN — HEPARIN SODIUM 5000 UNITS: 5000 INJECTION INTRAVENOUS; SUBCUTANEOUS at 20:42

## 2021-06-13 RX ADMIN — INSULIN LISPRO 1 UNITS: 100 INJECTION, SOLUTION INTRAVENOUS; SUBCUTANEOUS at 07:58

## 2021-06-13 ASSESSMENT — PAIN SCALES - GENERAL
PAINLEVEL_OUTOF10: 7
PAINLEVEL_OUTOF10: 5

## 2021-06-13 ASSESSMENT — PAIN DESCRIPTION - ORIENTATION
ORIENTATION: RIGHT
ORIENTATION: RIGHT

## 2021-06-13 ASSESSMENT — PAIN DESCRIPTION - LOCATION
LOCATION: GROIN;SCROTUM
LOCATION: FOOT

## 2021-06-13 ASSESSMENT — PAIN DESCRIPTION - PAIN TYPE: TYPE: CHRONIC PAIN

## 2021-06-13 ASSESSMENT — PAIN DESCRIPTION - FREQUENCY: FREQUENCY: CONTINUOUS

## 2021-06-13 ASSESSMENT — PAIN - FUNCTIONAL ASSESSMENT: PAIN_FUNCTIONAL_ASSESSMENT: ACTIVITIES ARE NOT PREVENTED

## 2021-06-13 ASSESSMENT — PAIN DESCRIPTION - ONSET: ONSET: ON-GOING

## 2021-06-13 ASSESSMENT — PAIN DESCRIPTION - DESCRIPTORS: DESCRIPTORS: ACHING

## 2021-06-13 NOTE — PROGRESS NOTES
Wound at right groin and scrotal area well granulated with few slough noted. Wound care and dressing change completed.

## 2021-06-13 NOTE — PROGRESS NOTES
Infectious Disease Progress Note  2021   Patient Name: Pierre Flaherty : 1975       Reason for visit: F/u right groin wound with cellulitis (status post debridement for necrotizing fasciitis)  History:? Interval history noted  Denies n/v/d/f or untoward effects of antimicrobials  Feels better, wound is less painful  Physical Exam:  Vital Signs: BP (!) 147/95   Pulse 64   Temp 97.7 °F (36.5 °C) (Oral)   Resp 17   Ht 5' 9\" (1.753 m)   Wt 270 lb (122.5 kg)   SpO2 96%   BMI 39.87 kg/m²     Gen: alert and oriented X3, no distress  Skin: no stigmata of endocarditis  Wounds: right groin ulcer with surrounding erythema and slough at the base. HEMT: AT/NC Oropharynx pink, moist, and without lesions or exudates; dentition in good state of repair  Eyes: PERRLA, EOMI, conjunctiva pink, sclera anicteric. Neck: Supple. Trachea midline. No LAD. Chest: no distress and CTA. Good air movement. Heart: RRR and no MRG. Abd: soft, non-distended, no tenderness, no hepatomegaly. Normoactive bowel sounds. Ext: no clubbing, cyanosis, or edema  Catheter Site: without erythema or tenderness  LDA: left arm midline. Neuro: Mental status intact. CN 2-12 intact and no focal sensory or motor deficits     Radiologic / Imaging / TESTING  CT of the abdomen and pelvis  Redemonstration of debridement involving the rightward aspect of the scrotum and the perineum. The amount of fat stranding adjacent to the wound appears increased. However, the gas and fluid collection seen previously within the right scrotum is no longer detected. The bubbles of gas seen previously tracking from the wound also no longer detected. No gas/fluid collections are identified on the current examination to suggest abscess formation.   Scrotal skin thickening, especially the right, increased when compared to the previous exam.  Small left hydrocele, decreased when compared to the previous exam.        Labs:    Recent Results (from the past 24 hour(s))   POCT Glucose    Collection Time: 06/12/21  4:01 PM   Result Value Ref Range    POC Glucose 172 (H) 70 - 99 MG/DL   POCT Glucose    Collection Time: 06/12/21  7:42 PM   Result Value Ref Range    POC Glucose 263 (H) 70 - 99 MG/DL   POCT Glucose    Collection Time: 06/13/21  6:33 AM   Result Value Ref Range    POC Glucose 197 (H) 70 - 99 MG/DL   CBC    Collection Time: 06/13/21  8:08 AM   Result Value Ref Range    WBC 6.5 4.0 - 10.5 K/CU MM    RBC 2.94 (L) 4.6 - 6.2 M/CU MM    Hemoglobin 8.8 (L) 13.5 - 18.0 GM/DL    Hematocrit 25.1 (L) 42 - 52 %    MCV 85.4 78 - 100 FL    MCH 29.9 27 - 31 PG    MCHC 35.1 32.0 - 36.0 %    RDW 14.1 11.7 - 14.9 %    Platelets 941 268 - 358 K/CU MM    MPV 9.4 7.5 - 11.1 FL   Procalcitonin    Collection Time: 06/13/21  8:08 AM   Result Value Ref Range    Procalcitonin 0.185    Renal Function Panel    Collection Time: 06/13/21  8:08 AM   Result Value Ref Range    Sodium 138 135 - 145 MMOL/L    Potassium 4.7 3.5 - 5.1 MMOL/L    Chloride 108 99 - 110 mMol/L    CO2 23 21 - 32 MMOL/L    Anion Gap 7 4 - 16    BUN 35 (H) 6 - 23 MG/DL    CREATININE 1.8 (H) 0.9 - 1.3 MG/DL    Glucose 199 (H) 70 - 99 MG/DL    Calcium 8.6 8.3 - 10.6 MG/DL    GFR Non- 41 (L) >60 mL/min/1.73m2    GFR  49 (L) >60 mL/min/1.73m2    Albumin 2.7 (L) 3.4 - 5.0 GM/DL    Phosphorus 4.2 2.5 - 4.9 MG/DL   POCT Glucose    Collection Time: 06/13/21 11:30 AM   Result Value Ref Range    POC Glucose 246 (H) 70 - 99 MG/DL     CULTURE results: Invalid input(s): BLOOD CULTURE,  URINE CULTURE, SURGICAL CULTURE    Diagnosis:  Patient Active Problem List   Diagnosis    Hiatal hernia    Diabetes mellitus type 2, improved controlled    Diabetic neuropathy (HCC)    Panic attack    Anxiety associated with depression    Neck pain    DANIELA (obstructive sleep apnea)    Urinary frequency    Bilateral carpal tunnel syndrome- left worse than right    Hyperlipidemia with target LDL less than 100    Essential hypertension    Bronchitis    Osteomyelitis (HCC)    Diabetic ulcer of left midfoot (HCC)    WD-Diabetic ulcer of left midfoot associated with type 2 diabetes mellitus, with fat layer exposed ([address]    Abscess    Periumbilical hernia    WD-Wound, surgical, infected, initial encounter    Sepsis ([address]    Cellulitis of left foot    Constipation    Intractable nausea and vomiting    Uncontrolled type 2 diabetes mellitus (HCC)    Nausea and vomiting    Diabetic foot infection ([address]    Acute renal failure (ARF) (HCC)    Osteomyelitis of fourth toe of right foot (HCC)    Cellulitis    Cellulitis of left arm    Cellulitis, scrotum    Acute epididymitis    Irene gangrene    Recurrent major depressive disorder, in full remission ([address]    Generalized anxiety disorder    Morbid obesity with body mass index (BMI) of 40.0 to 44.9 in [facility])    Necrotizing fasciitis (HCC)    Syncope and collapse    Right groin wound    Ulcer of right groin with fat layer exposed ([address]       Active Problems  Active Problems:    Syncope and collapse    Right groin wound    Ulcer of right groin with fat layer exposed ([address]  Resolved Problems:    * No resolved hospital problems. *      Impression and plan   Summary and rationale: Patient is a 55 y.o.  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 Treated with vancomycin and metronidazole. Readmitted for syncope. Has right groin wound with cellulitis. Wound cx postive for E coli and E cloacae.    Tinea cruris    Clinical status: Improving   Therapeutic: continue meropenem, discharge with ertapenem 1g daily for  10 days (end-date: 6/23/2021)   Fluconazole 200 mg po weekly for 2 weeks  After discharge the following should be done:  Weekly labs drawn on Monday during the course of treatment  CBC with differential, CMP, ESR,

## 2021-06-13 NOTE — PROGRESS NOTES
LABALBU 2.7 06/13/2021    LABALBU 48 03/27/2021     Calcium:    Lab Results   Component Value Date    CALCIUM 8.6 06/13/2021     Phosphorus:    Lab Results   Component Value Date    PHOS 4.2 06/13/2021     U/A:    Lab Results   Component Value Date    NITRU NEGATIVE 06/09/2021    COLORU YELLOW 06/09/2021    PHUR 6.5 06/20/2013    WBCUA 1 06/09/2021    RBCUA <1 06/09/2021    MUCUS RARE 06/05/2021    TRICHOMONAS NONE SEEN 06/09/2021    BACTERIA NEGATIVE 06/09/2021    CLARITYU CLEAR 06/09/2021    SPECGRAV 1.014 06/09/2021    UROBILINOGEN NEGATIVE 06/09/2021    BILIRUBINUR NEGATIVE 06/09/2021    BILIRUBINUR neg 06/20/2013    BLOODU SMALL 06/09/2021    GLUCOSEU 1,000 06/20/2013    KETUA NEGATIVE 06/09/2021    AMORPHOUS RARE 03/26/2021     ABG:    Lab Results   Component Value Date    ZDY0XBL 47.0 06/18/2013    PO2ART 119 06/18/2013    SRJ7WPP 24.2 06/18/2013    BEART 2 06/18/2013     HgBA1c:    Lab Results   Component Value Date    LABA1C 8.6 06/06/2021     Microalbumen/Creatinine ratio:  No components found for: RUCREAT  TSH:  No results found for: TSH  IRON:  No results found for: IRON  Iron Saturation:  No components found for: PERCENTFE  TIBC:  No results found for: TIBC  FERRITIN:  No results found for: FERRITIN  RPR:  No results found for: RPR  LOVE:    Lab Results   Component Value Date    LOVE None Detected 03/28/2021     24 Hour Urine for Creatinine Clearance:  No components found for: CREAT4, UHRS10, UTV10      Objective:   I/O: 06/12 0701 - 06/13 0700  In: 480 [P.O.:480]  Out: 1300 [Urine:1300]  I/O last 3 completed shifts: In: 480 [P.O.:480]  Out: 1300 [Urine:1300]  No intake/output data recorded. Vitals: BP (!) 147/95   Pulse 64   Temp 97.7 °F (36.5 °C) (Oral)   Resp 17   Ht 5' 9\" (1.753 m)   Wt 270 lb (122.5 kg)   SpO2 96%   BMI 39.87 kg/m²  {  General appearance: awake weak  HEENT: Head: Normal, normocephalic, atraumatic.   Neck: supple, symmetrical, trachea midline  Lungs: diminished breath sounds bilaterally  Heart: S1, S2 normal  Abdomen: abnormal findings:  soft nt obese  Extremities: edema trace  Neurologic: Mental status: alertness: alert        Assessment and Plan:      IMP:  Acute renal failure from ATN on CKD 3  2. Type 2 diabetes  3. Metabolic acidosis  4. Hypertension  5. Groin abscess with E. coli and Enterobacter    Plan     #1 renal function slowly improving monitor  2. Glucose control as able  3. Acidosis stable  4. Blood pressure slightly increased will follow up with him after morning meds  5.   Overall wound care on antibiotics and give meds for nausea we will follow monitor hemoglobin           Wyatt Mccormick MD, MD

## 2021-06-13 NOTE — PROGRESS NOTES
INTERNAL MEDICINE PROGRESS NOTE        Carlos Current   1975   Primary Care Physician:  Anupama Chowdary MD  Admit Date: 6/5/2021     Subjective:   Pt is doing better today. Denies chest pain, SOB, nausea, vomiting, abdominal pain. Remainder of ROS is unremarkable. Meds, labs and other notes reviewed. BS are elevated. Objective:   BP (!) 147/95   Pulse 64   Temp 97.7 °F (36.5 °C) (Oral)   Resp 17   Ht 5' 9\" (1.753 m)   Wt 270 lb (122.5 kg)   SpO2 96%   BMI 39.87 kg/m²    Recent Labs     06/12/21  1057 06/12/21  1601 06/12/21  1942 06/13/21  0633   POCGLU 244* 172* 263* 197*       I/O last 3 completed shifts: In: 480 [P.O.:480]  Out: 1300 [Urine:1300]  I/O this shift:  In: -   Out: 1000 [Urine:1000]    Neck: no adenopathy and supple, symmetrical, trachea midline  Lungs: clear to auscultation bilaterally  Heart: regular rate and rhythm and S1, S2 normal  Abdomen: obese, soft, non-tender; bowel sounds normal; no masses,  no organomegaly  Extremities: extremities normal, atraumatic, no cyanosis or edema  Neurologic: Grossly normal.  Scrotum and groin exam as per surgery. Data Review  CBC with Differential:    Recent Labs     06/11/21  0609 06/12/21  0754 06/13/21  0808   WBC 5.2 7.2 6.5   RBC 2.77* 2.83* 2.94*   HGB 8.2* 8.1* 8.8*   HCT 24.0* 25.6* 25.1*    311 298   MCV 86.6 90.5 85.4   MCH 29.6 28.6 29.9   MCHC 34.2 31.6* 35.1   RDW 13.9 14.1 14.1     CMP:    Recent Labs     06/11/21  0609 06/12/21  0754 06/13/21  0808    135 138   K 4.8 5.1 4.7    109 108   CO2 21 19* 23   BUN 41* 37* 35*   CREATININE 2.3* 2.0* 1.8*   GFRAA 37* 44* 49*   LABGLOM 31* 36* 41*   GLUCOSE 208* 187* 199*   PROT 5.9* 5.8*  --    LABALBU 2.9* 2.7* 2.7*   CALCIUM 8.3 8.1* 8.6   BILITOT 0.1 0.1  --    ALKPHOS 66 62  --    AST 11* 11*  --    ALT 9* 9*  --      PT/INR:  No results for input(s): PROTIME, INR in the last 72 hours.   Meds:    sodium bicarbonate  650 mg Oral 4x Daily    insulin glargine 25 Units Subcutaneous Nightly    meropenem  1,000 mg Intravenous Q8H    fluconazole  200 mg Oral Weekly    ondansetron  4 mg Intravenous Once    amLODIPine  10 mg Oral Daily    metoprolol tartrate  25 mg Oral BID    atorvastatin  40 mg Oral Nightly    collagenase   Topical Daily    miconazole   Topical BID    aspirin  81 mg Oral Daily    tamsulosin  0.4 mg Oral Daily    sertraline  100 mg Oral Daily    sodium chloride flush  5-40 mL Intravenous 2 times per day    heparin (porcine)  5,000 Units Subcutaneous 3 times per day    insulin lispro  0.05 Units/kg Subcutaneous TID WC    insulin lispro  0-6 Units Subcutaneous TID WC    insulin lispro  0-3 Units Subcutaneous Nightly     PRN Meds: ondansetron, oxyCODONE-acetaminophen, glucose, dextrose, glucagon (rDNA), dextrose, sodium chloride flush, sodium chloride, potassium chloride **OR** potassium alternative oral replacement **OR** potassium chloride, magnesium sulfate, promethazine **OR** ondansetron, polyethylene glycol, fleet, acetaminophen **OR** acetaminophen, zolpidem, guaiFENesin-dextromethorphan, benzonatate, calcium carbonate, morphine    Assessment/Plan:   1. History of tolu gangrene s/p excisional debridement x two. Continue Meropenem and Diflucan. 2.  CARMEN on CKD. Cret is improving. 1.8 today. 3.  Metabolic acidosis. Resolved. 4.  HTN. BP is stable. 5.  Poorly controlled DM. Will increase the dose on Lantus. Also continue SSI coverage.         Rolando Mitchell MD,   6/13/2021 11:25 AM

## 2021-06-14 LAB
ALBUMIN SERPL-MCNC: 2.4 GM/DL (ref 3.4–5)
ANION GAP SERPL CALCULATED.3IONS-SCNC: 9 MMOL/L (ref 4–16)
BUN BLDV-MCNC: 34 MG/DL (ref 6–23)
CALCIUM SERPL-MCNC: 8.3 MG/DL (ref 8.3–10.6)
CHLORIDE BLD-SCNC: 110 MMOL/L (ref 99–110)
CO2: 22 MMOL/L (ref 21–32)
CREAT SERPL-MCNC: 1.7 MG/DL (ref 0.9–1.3)
GFR AFRICAN AMERICAN: 53 ML/MIN/1.73M2
GFR NON-AFRICAN AMERICAN: 44 ML/MIN/1.73M2
GLUCOSE BLD-MCNC: 161 MG/DL (ref 70–99)
GLUCOSE BLD-MCNC: 161 MG/DL (ref 70–99)
GLUCOSE BLD-MCNC: 168 MG/DL (ref 70–99)
GLUCOSE BLD-MCNC: 240 MG/DL (ref 70–99)
GLUCOSE BLD-MCNC: 252 MG/DL (ref 70–99)
HCT VFR BLD CALC: 27.2 % (ref 42–52)
HEMOGLOBIN: 9.1 GM/DL (ref 13.5–18)
MCH RBC QN AUTO: 29.5 PG (ref 27–31)
MCHC RBC AUTO-ENTMCNC: 33.5 % (ref 32–36)
MCV RBC AUTO: 88.3 FL (ref 78–100)
PDW BLD-RTO: 14 % (ref 11.7–14.9)
PHOSPHORUS: 4.1 MG/DL (ref 2.5–4.9)
PLATELET # BLD: 292 K/CU MM (ref 140–440)
PMV BLD AUTO: 9.1 FL (ref 7.5–11.1)
POTASSIUM SERPL-SCNC: 4.7 MMOL/L (ref 3.5–5.1)
RBC # BLD: 3.08 M/CU MM (ref 4.6–6.2)
SODIUM BLD-SCNC: 141 MMOL/L (ref 135–145)
WBC # BLD: 7.1 K/CU MM (ref 4–10.5)

## 2021-06-14 PROCEDURE — 85027 COMPLETE CBC AUTOMATED: CPT

## 2021-06-14 PROCEDURE — 80069 RENAL FUNCTION PANEL: CPT

## 2021-06-14 PROCEDURE — 99211 OFF/OP EST MAY X REQ PHY/QHP: CPT

## 2021-06-14 PROCEDURE — 6370000000 HC RX 637 (ALT 250 FOR IP): Performed by: FAMILY MEDICINE

## 2021-06-14 PROCEDURE — 6360000002 HC RX W HCPCS: Performed by: FAMILY MEDICINE

## 2021-06-14 PROCEDURE — 6360000002 HC RX W HCPCS: Performed by: INTERNAL MEDICINE

## 2021-06-14 PROCEDURE — 1200000000 HC SEMI PRIVATE

## 2021-06-14 PROCEDURE — 6370000000 HC RX 637 (ALT 250 FOR IP): Performed by: NURSE PRACTITIONER

## 2021-06-14 PROCEDURE — 2580000003 HC RX 258: Performed by: INTERNAL MEDICINE

## 2021-06-14 PROCEDURE — 82962 GLUCOSE BLOOD TEST: CPT

## 2021-06-14 PROCEDURE — 6370000000 HC RX 637 (ALT 250 FOR IP): Performed by: INTERNAL MEDICINE

## 2021-06-14 PROCEDURE — 94761 N-INVAS EAR/PLS OXIMETRY MLT: CPT

## 2021-06-14 PROCEDURE — 94150 VITAL CAPACITY TEST: CPT

## 2021-06-14 PROCEDURE — 36415 COLL VENOUS BLD VENIPUNCTURE: CPT

## 2021-06-14 RX ADMIN — SODIUM BICARBONATE 650 MG TABLET 650 MG: at 09:21

## 2021-06-14 RX ADMIN — METOPROLOL TARTRATE 25 MG: 25 TABLET, FILM COATED ORAL at 21:32

## 2021-06-14 RX ADMIN — HEPARIN SODIUM 5000 UNITS: 5000 INJECTION INTRAVENOUS; SUBCUTANEOUS at 05:44

## 2021-06-14 RX ADMIN — HEPARIN SODIUM 5000 UNITS: 5000 INJECTION INTRAVENOUS; SUBCUTANEOUS at 12:52

## 2021-06-14 RX ADMIN — TAMSULOSIN HYDROCHLORIDE 0.4 MG: 0.4 CAPSULE ORAL at 09:21

## 2021-06-14 RX ADMIN — AMLODIPINE BESYLATE 10 MG: 10 TABLET ORAL at 09:21

## 2021-06-14 RX ADMIN — Medication 2 MG: at 00:06

## 2021-06-14 RX ADMIN — ASPIRIN 81 MG: 81 TABLET, COATED ORAL at 09:21

## 2021-06-14 RX ADMIN — METOPROLOL TARTRATE 25 MG: 25 TABLET, FILM COATED ORAL at 09:21

## 2021-06-14 RX ADMIN — MEROPENEM 1000 MG: 1 INJECTION, POWDER, FOR SOLUTION INTRAVENOUS at 21:46

## 2021-06-14 RX ADMIN — SERTRALINE HYDROCHLORIDE 100 MG: 100 TABLET ORAL at 09:21

## 2021-06-14 RX ADMIN — INSULIN LISPRO 1 UNITS: 100 INJECTION, SOLUTION INTRAVENOUS; SUBCUTANEOUS at 12:52

## 2021-06-14 RX ADMIN — INSULIN GLARGINE 25 UNITS: 100 INJECTION, SOLUTION SUBCUTANEOUS at 21:39

## 2021-06-14 RX ADMIN — OXYCODONE HYDROCHLORIDE AND ACETAMINOPHEN 1 TABLET: 5; 325 TABLET ORAL at 21:32

## 2021-06-14 RX ADMIN — HEPARIN SODIUM 5000 UNITS: 5000 INJECTION INTRAVENOUS; SUBCUTANEOUS at 21:39

## 2021-06-14 RX ADMIN — INSULIN LISPRO 1 UNITS: 100 INJECTION, SOLUTION INTRAVENOUS; SUBCUTANEOUS at 09:54

## 2021-06-14 RX ADMIN — OXYCODONE HYDROCHLORIDE AND ACETAMINOPHEN 1 TABLET: 5; 325 TABLET ORAL at 09:20

## 2021-06-14 RX ADMIN — MEROPENEM 1000 MG: 1 INJECTION, POWDER, FOR SOLUTION INTRAVENOUS at 12:51

## 2021-06-14 RX ADMIN — MEROPENEM 1000 MG: 1 INJECTION, POWDER, FOR SOLUTION INTRAVENOUS at 05:44

## 2021-06-14 RX ADMIN — INSULIN LISPRO 2 UNITS: 100 INJECTION, SOLUTION INTRAVENOUS; SUBCUTANEOUS at 18:42

## 2021-06-14 RX ADMIN — ZOLPIDEM TARTRATE 5 MG: 5 TABLET ORAL at 21:32

## 2021-06-14 RX ADMIN — OXYCODONE HYDROCHLORIDE AND ACETAMINOPHEN 1 TABLET: 5; 325 TABLET ORAL at 14:57

## 2021-06-14 RX ADMIN — ATORVASTATIN CALCIUM 40 MG: 40 TABLET, FILM COATED ORAL at 21:32

## 2021-06-14 ASSESSMENT — PAIN DESCRIPTION - PAIN TYPE
TYPE: ACUTE PAIN
TYPE: ACUTE PAIN

## 2021-06-14 ASSESSMENT — PAIN SCALES - GENERAL
PAINLEVEL_OUTOF10: 0
PAINLEVEL_OUTOF10: 7
PAINLEVEL_OUTOF10: 8

## 2021-06-14 ASSESSMENT — PAIN DESCRIPTION - FREQUENCY
FREQUENCY: CONTINUOUS
FREQUENCY: CONTINUOUS

## 2021-06-14 ASSESSMENT — PAIN DESCRIPTION - LOCATION
LOCATION: SCROTUM
LOCATION: SCROTUM

## 2021-06-14 ASSESSMENT — PAIN DESCRIPTION - DESCRIPTORS
DESCRIPTORS: ACHING;CONSTANT
DESCRIPTORS: ACHING

## 2021-06-14 ASSESSMENT — PAIN DESCRIPTION - PROGRESSION
CLINICAL_PROGRESSION: NOT CHANGED
CLINICAL_PROGRESSION: NOT CHANGED

## 2021-06-14 ASSESSMENT — PAIN DESCRIPTION - ONSET
ONSET: ON-GOING
ONSET: ON-GOING

## 2021-06-14 NOTE — PROGRESS NOTES
Attending Progress Note      PCP: Marisa García MD      Patient: Katelynn Mendez   Gender: male  : 1975   Age: 55 y.o. MRN: [id number]  Room  : [address] [place] [address]      Date of Admission: 2021    Chief Complaint   Patient presents with    Wound Check     Open wound to scrotum. Had a coughing fit at home and was incontinent and got stool in his wound           Subjective:  Pain  controlled . ... on and off dizzy            Medications:  Reviewed  Infusion Medications    dextrose      sodium chloride       Scheduled Medications    insulin glargine  25 Units Subcutaneous Nightly    meropenem  1,000 mg Intravenous Q8H    fluconazole  200 mg Oral Weekly    ondansetron  4 mg Intravenous Once    amLODIPine  10 mg Oral Daily    metoprolol tartrate  25 mg Oral BID    atorvastatin  40 mg Oral Nightly    collagenase   Topical Daily    miconazole   Topical BID    aspirin  81 mg Oral Daily    tamsulosin  0.4 mg Oral Daily    sertraline  100 mg Oral Daily    sodium chloride flush  5-40 mL Intravenous 2 times per day    heparin (porcine)  5,000 Units Subcutaneous 3 times per day    insulin lispro  0.05 Units/kg Subcutaneous TID WC    insulin lispro  0-6 Units Subcutaneous TID WC    insulin lispro  0-3 Units Subcutaneous Nightly     PRN Meds: ondansetron, oxyCODONE-acetaminophen, glucose, dextrose, glucagon (rDNA), dextrose, sodium chloride flush, sodium chloride, potassium chloride **OR** potassium alternative oral replacement **OR** potassium chloride, magnesium sulfate, promethazine **OR** ondansetron, polyethylene glycol, fleet, acetaminophen **OR** acetaminophen, zolpidem, guaiFENesin-dextromethorphan, benzonatate, calcium carbonate, morphine      Intake/Output Summary (Last 24 hours) at 2021 1546  Last data filed at 2021 1456  Gross per 24 hour   Intake 600 ml   Output 2050 ml   Net -1450 ml       Exam:  BP (!) 147/88   Pulse 71   Temp 98.6 °F (37 °C) (Oral)   Resp 18   Ht 5' 9\" : no abscess   surg consult /wound care input noted    stress test  : cardiolite mild lat wall ischemia ef 48%  And 2 D echo on 5/13/20221 :  Left ventricular systolic function is normal.   Ejection fraction is visually estimated at 55-60%. Mild left ventricular hypertrophy. Mildly dilated right ventricle. RVSP of 20 mmHG   Sclerotic, but non-stenotic aortic valve. No evidence of any pericardial effusion. Glucose control, insulin coverage   Pain control   Home meds , reviewed and resumed as appropriate   Symptoms releif/Pain control  DVT Prophylaxis   Diet: Adult Oral Nutrition Supplement; Diabetic Oral Supplement  ADULT DIET; Regular; 5 carb choices (75 gm/meal); Low Potassium (Less than 3000 mg/day); 1800 ml  Code Status: Full Code          Treatment progress and plan was d/w pt/family .     Abiola Truong MD

## 2021-06-14 NOTE — CONSULTS
[address] [address] Continence Nurse  Consult Note       Tess Wilson  AGE: 55 y.o. GENDER: male  : 1975  TODAY'S DATE:  2021    Subjective:     Reason for  Evaluation and Assessment: wound care Reassessment. Tess Wilson is a 55 y.o. male referred by:   [x] Physician  [] Nursing  [] Other:     Wound Identification:  Wound Type: diabetic and non-healing surgical  Contributing Factors: diabetes        PAST MEDICAL HISTORY        Diagnosis Date    Abscess     scrotal    Acute renal failure (ARF) ([address] 2019    Anxiety associated with depression     Anxiety associated with depression     Back pain 2012    Chest pain 2013    Diabetic nephropathy ([address]     Diabetic neuropathy ([address] 2011    Diabetic ulcer of left midfoot associated with type 2 diabetes mellitus, with fat layer exposed ([address] 2017    Diverticulosis     C scope + Dr. Javy Henry DM (diabetes mellitus), type 2 ([address]     DR. 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 Javy Henry Necrotizing fasciitis [facility])     Nose fracture     Panic attacks     Pericarditis     Hospitalized with s/p heart cath normal    Peripheral autonomic neuropathy due to diabetes mellitus ([address]     Axonal EMG- NCS, 2011    Sebaceous cyst 2011    URI (upper respiratory infection) 2012    WD-Wound, surgical, infected, initial encounter 2017    Wrist fracture 1986       PAST SURGICAL HISTORY    Past Surgical History:   Procedure Laterality Date   [address] 2013    Normal (Dr. Devon Bain)    COLONOSCOPY  2011    Pandiverticulosis, Nonbleeding internal hemorrhoids, Repeat colonoscopy at age 48- Dr. Matteo Sheppard    \"had reconstruction surgery on nose a year after the other nose surgery\"    OTHER SURGICAL HISTORY Right 2015    I & D right great toe with partial amputation    OTHER SURGICAL HISTORY  12/04/2017    inc and drainage of abcess    VA DEEP INCIS FOOT BONE INFECTN Left 9/22/2018    LEFT FOOT DEBRIDEMENT INCISION AND DRAINAGE TOP AND BOTTOM performed by Richard Hill DPM at [facility] N/A 5/15/2021    SCROTAL AND PERINEAL DEBRIDEMENT performed by Tito Ogden MD at [facility] N/A 5/16/2021    SCROTAL RE-DEBRIDEMENT  INCISION AND DRAINAGE performed by Tito Ogden MD at [address] [place] [address] [phone]    sebaceous cyst removal times 4     TOE AMPUTATION      right great toe    TOE AMPUTATION Right 3/23/2019    TOE AMPUTATION RIGHT 5TH TOE performed by Richard Hill DPM at [place] [address] TOE AMPUTATION Right 8/6/2019    TOE AMPUTATION RIGHT 4TH TOE performed by Richard Hill DPM at [address] [place] [address] UPPER GASTROINTESTINAL ENDOSCOPY  06/2/2011    Mild gastritis with moderatley severe antritis, small hiatal hernia, Dr. Maria Eugenia Giles N/A 1/12/2019    EGD BIOPSY performed by Eric Bennett MD at [facility]    Family History   Problem Relation Age of Onset    Cancer Mother         ?site   [facility] Stroke Mother     Bleeding Prob Mother     Diabetes Father     Heart Disease Father     High Blood Pressure Father     Obesity Father     Kidney Disease Father     Diabetes Sister     High Blood Pressure Sister     Mental Illness Sister     Obesity Sister     High Blood Pressure Other     Mental Illness Other         bipolar    Other Son         cyst in ear canal    ADHD Daughter        SOCIAL HISTORY    Social History     Tobacco Use    Smoking status: Former Smoker     Years: 20.00     Quit date: 11/18/2017     Years since quitting: 3.5    Smokeless tobacco: Never Used   Vaping Use    Vaping Use: Never used   Substance Use Topics    Alcohol use: Yes     Comment: occ    Drug use:  Yes Frequency: 7.0 times per week     Types: Marijuana       ALLERGIES    Allergies   Allergen Reactions    Adhesive Tape Rash    Doxycycline Nausea And Vomiting    Reglan [Metoclopramide] Anxiety       MEDICATIONS    No current facility-administered medications on file prior to encounter. Current Outpatient Medications on File Prior to Encounter   Medication Sig Dispense Refill    sertraline (ZOLOFT) 50 MG tablet Take 2 tablets by mouth daily 30 tablet 3    vancomycin (VANCOCIN) infusion Infuse 1,500 mg intravenously every 24 hours for 21 days Compound per protocol. 38841 mg 0    chlorthalidone (HYGROTON) 25 MG tablet Take 1 tablet by mouth daily 30 tablet 3    amLODIPine-benazepril (LOTREL) 5-20 MG per capsule Take 1 capsule by mouth daily 30 capsule 3    aspirin 81 MG EC tablet Take 1 tablet by mouth daily 30 tablet 3    linagliptin (TRADJENTA) 5 MG tablet Take 1 tablet by mouth daily 30 tablet 5    ondansetron (ZOFRAN) 4 MG tablet Take 1 tablet by mouth every 8 hours as needed for Nausea or Vomiting 20 tablet 0    tamsulosin (FLOMAX) 0.4 MG capsule Take 1 capsule by mouth daily 30 capsule 3    Lancets MISC 1 each by Does not apply route daily 100 each 3    glucose monitoring kit (FREESTYLE) monitoring kit 1 each by Does not apply route once for 1 dose.  1 kit 0         Objective:      BP (!) 147/88   Pulse 71   Temp 98.6 °F (37 °C) (Oral)   Resp 18   Ht 5' 9\" (1.753 m)   Wt 270 lb (122.5 kg)   SpO2 97%   BMI 39.87 kg/m²   Pradeep Risk Score: Pradeep Scale Score: 18    LABS    CBC:   Lab Results   Component Value Date    WBC 7.1 06/14/2021    RBC 3.08 06/14/2021    HGB 9.1 06/14/2021    HCT 27.2 06/14/2021    MCV 88.3 06/14/2021    MCH 29.5 06/14/2021    MCHC 33.5 06/14/2021    RDW 14.0 06/14/2021     06/14/2021    MPV 9.1 06/14/2021     CMP:    Lab Results   Component Value Date     06/14/2021    K 4.7 06/14/2021     06/14/2021    CO2 22 06/14/2021    BUN 34 06/14/2021 CREATININE 1.7 06/14/2021    GFRAA 53 06/14/2021    LABGLOM 44 06/14/2021    GLUCOSE 168 06/14/2021    PROT 5.8 06/12/2021    PROT 7.1 04/11/2012    LABALBU 2.4 06/14/2021    LABALBU 48 03/27/2021    CALCIUM 8.3 06/14/2021    BILITOT 0.1 06/12/2021    ALKPHOS 62 06/12/2021    AST 11 06/12/2021    ALT 9 06/12/2021     Albumin:    Lab Results   Component Value Date    LABALBU 2.4 06/14/2021    LABALBU 48 03/27/2021     PT/INR:    Lab Results   Component Value Date    PROTIME 11.8 03/22/2019    PROTIME 12.5 12/13/2010    INR 1.02 03/22/2019     HgBA1c:    Lab Results   Component Value Date    LABA1C 8.6 06/06/2021         Assessment:     Patient Active Problem List   Diagnosis    Hiatal hernia    Diabetes mellitus type 2, improved controlled    Diabetic neuropathy ([address]    Panic attack    Anxiety associated with depression    Neck pain    DANIELA (obstructive sleep apnea)    Urinary frequency    Bilateral carpal tunnel syndrome- left worse than right    Hyperlipidemia with target LDL less than 100    Essential hypertension    Bronchitis    Osteomyelitis ([address]    Diabetic ulcer of left midfoot ([address]    WD-Diabetic ulcer of left midfoot associated with type 2 diabetes mellitus, with fat layer exposed ([address]    Abscess    Periumbilical hernia    WD-Wound, surgical, infected, initial encounter    Sepsis ([address]    Cellulitis of left foot    Constipation    Intractable nausea and vomiting    Uncontrolled type 2 diabetes mellitus (HCC)    Nausea and vomiting    Diabetic foot infection ([address]    Acute renal failure (ARF) (HCC)    Osteomyelitis of fourth toe of right foot ([address]    Cellulitis    Cellulitis of left arm    Cellulitis, scrotum    Acute epididymitis    Irene gangrene    Recurrent major depressive disorder, in full remission ([address]    Generalized anxiety disorder    Morbid obesity with body mass index (BMI) of 40.0 to 44.9 in [facility])    Necrotizing fasciitis ([address]    Syncope and collapse    Right groin wound    Ulcer of right groin with fat layer exposed ([address]       Measurements:  Wound 07/18/17 Other (Comment) WOUND #2 LEFT PLANTAR (ONSET 3 MTHS) (Active)   Number of days: 1427       Wound 12/03/17 Other (Comment) Foot Left (Active)   Number of days: 1288       Wound 12/08/17 #3 abdoment (onset 3 days ago) SURGICAL (Active)   Number of days: 1423       Wound 12/08/17 #4 Lt plantar (onset 6 months ago) DIABETIC ALCALA 1 (Active)   Number of days: 2740       Wound 05/18/18 # 5 LEFT PLANTAR (ONSET 1 YEAR AGO) DM WAG 1 (Active)   Number of days: 8230       Wound 09/17/18 Left dorsal foot and 4th toe amp site 9/19/18 (Active)   Number of days: 1000       Incision 05/22/15 Foot Right (Active)   Number of days: 2214       Incision 12/04/17 Abdomen Mid (Active)   Number of days: 0872       Incision 09/22/18 Foot Left (Active)   Number of days: 848       Wound 01/11/19 left plantar foot wound (Active)   Number of days: 825       Wound 03/21/19 Toe (Comment  which one) Anterior;Right (Active)   Number of days: 816       Wound 03/21/19 Foot Left;Posterior hard callus area (Active)   Number of days: 816       Wound 03/24/21 Left;Plantar (Active)   Number of days: 82       Wound 05/13/21 Left;Plantar (Active)   Wound Etiology Diabetic 06/12/21 2012   Dressing Status Clean;Dry; Intact 06/14/21 1245   Wound Cleansed Cleansed with saline 06/12/21 2012   Dressing/Treatment Dry dressing;ABD 06/12/21 2012   Wound Length (cm) 0.5 cm 06/07/21 0900   Wound Width (cm) 0.8 cm 06/07/21 0900   Wound Depth (cm) 0.1 cm 06/07/21 0900   Wound Surface Area (cm^2) 0.4 cm^2 06/07/21 0900   Change in Wound Size % (l*w) -166.67 06/07/21 0900   Wound Volume (cm^3) 0.04 cm^3 06/07/21 0900   Wound Healing % 50 06/07/21 0900   Distance Tunneling (cm) 0 cm 06/08/21 1206   Tunneling Position ___ O'Clock 0 06/08/21 1206   Undermining Starts ___ O'Clock 0 06/08/21 1206   Undermining Ends___ O'Clock 0 06/08/21 1206   Undermining Maxium Distance (cm) 0 06/08/21 1206   Wound Assessment Dry;Eschar dry 06/08/21 1206   Drainage Amount Small 06/12/21 0230   Drainage Description Thin 06/12/21 0230   Odor Moderate 06/12/21 0230   Shazia-wound Assessment Intact 06/08/21 1206   Margins Defined edges 06/08/21 1206   Wound Thickness Description not for Pressure Injury Full thickness 06/08/21 1206   Number of days: 31       Wound 05/17/21 Groin Right scrotum extends to lower buttock (Active)   Wound Etiology Non-Healing Surgical 06/14/21 1144   Dressing Status Dry; Intact 06/14/21 1245   Wound Cleansed Cleansed with saline 06/14/21 1144   Dressing/Treatment Mesh panties 06/14/21 1144   Dressing Change Due 06/14/21 06/13/21 1835   Wound Length (cm) 18 cm 06/14/21 1144   Wound Width (cm) 9 cm 06/14/21 1144   Wound Depth (cm) 3.2 cm 06/14/21 1144   Wound Surface Area (cm^2) 162 cm^2 06/14/21 1144   Change in Wound Size % (l*w) 52 06/14/21 1144   Wound Volume (cm^3) 518.4 cm^3 06/14/21 1144   Wound Healing % 62 06/14/21 1144   Distance Tunneling (cm) 0 cm 06/14/21 1144   Tunneling Position ___ O'Clock 0 06/14/21 1144   Undermining Starts ___ O'Clock 0 06/14/21 1144   Undermining Ends___ O'Clock 0 06/14/21 1144   Undermining Maxium Distance (cm) 0 06/14/21 1144   Wound Assessment Granulation tissue;Slough 06/14/21 0012   Drainage Amount Moderate 06/14/21 1144   Drainage Description Serosanguinous 06/14/21 1144   Odor None 06/14/21 1144   Shazia-wound Assessment Intact 06/14/21 1144   Margins Defined edges 06/14/21 1144   Wound Thickness Description not for Pressure Injury Full thickness 06/14/21 1144   Number of days: 28       Response to treatment:  With complaints of pain.      Pain Assessment:  Severity:  Mild  Quality of pain: sore  Wound Pain Timing/Severity: dressing change  Premedicated:  yes    Plan:     Plan of Care: Wound 05/13/21 Left;Plantar-Dressing/Treatment:  (santyl)  Wound 05/17/21 Groin Right scrotum extends to lower buttock-Dressing/Treatment:

## 2021-06-14 NOTE — PROGRESS NOTES
Nephrology Progress Note  6/14/2021 10:03 AM        Subjective:   Admit Date: 6/5/2021  PCP: Federico Brown MD    Interval History: slow improvement     Diet: better    ROS:  No sob  pain controlled     Data:     Current meds:    sodium bicarbonate  650 mg Oral 4x Daily    insulin glargine  25 Units Subcutaneous Nightly    meropenem  1,000 mg Intravenous Q8H    fluconazole  200 mg Oral Weekly    ondansetron  4 mg Intravenous Once    amLODIPine  10 mg Oral Daily    metoprolol tartrate  25 mg Oral BID    atorvastatin  40 mg Oral Nightly    collagenase   Topical Daily    miconazole   Topical BID    aspirin  81 mg Oral Daily    tamsulosin  0.4 mg Oral Daily    sertraline  100 mg Oral Daily    sodium chloride flush  5-40 mL Intravenous 2 times per day    heparin (porcine)  5,000 Units Subcutaneous 3 times per day    insulin lispro  0.05 Units/kg Subcutaneous TID WC    insulin lispro  0-6 Units Subcutaneous TID WC    insulin lispro  0-3 Units Subcutaneous Nightly      dextrose      sodium chloride           I/O last 3 completed shifts:  In: -   Out: 1450 [Urine:1450]    CBC:   Recent Labs     06/12/21  0754 06/13/21  0808 06/14/21  0932   WBC 7.2 6.5 7.1   HGB 8.1* 8.8* 9.1*    298 292          Recent Labs     06/12/21  0754 06/13/21  0808 06/14/21  0829    138 141   K 5.1 4.7 4.7    108 110   CO2 19* 23 22   BUN 37* 35* 34*   CREATININE 2.0* 1.8* 1.7*   GLUCOSE 187* 199* 168*       Lab Results   Component Value Date    CALCIUM 8.3 06/14/2021    PHOS 4.1 06/14/2021       Objective:     Vitals: BP (!) 147/88   Pulse 71   Temp 98.6 °F (37 °C) (Oral)   Resp 18   Ht 5' 9\" (1.753 m)   Wt 270 lb (122.5 kg)   SpO2 97%   BMI 39.87 kg/m²     General appearance:   No ac distress   HEENT:  No conj pallor  Neck:  supple  Lungs:  No crackles  Heart:  Seems RRR  Abdomen: soft  Extremities:  No overt edema   groin wound better       Problem List :         Impression :     1.  CARMEN- CKD stage 3 a A3- recovering   2. DM/NS - skin, soft tissue infection etc   3. Met acidosis getting better with renal  recovery   4. HTn acceptable     Recommendation/Plan  :     1. D/c po bicarb as his naomi likely will get better with renal recovery  2. Low salt  3. 'from my standpoint - he can be d/c   4. Good BS control  5. He is with multiple abx   6. 'DASH diet  7. CMP , mg , [phos in 1-2 wks   8.  F/u with em in 3-04 wks       Cristiane Brar MD MD

## 2021-06-15 VITALS
HEART RATE: 72 BPM | HEIGHT: 69 IN | TEMPERATURE: 97.4 F | DIASTOLIC BLOOD PRESSURE: 76 MMHG | SYSTOLIC BLOOD PRESSURE: 113 MMHG | OXYGEN SATURATION: 95 % | BODY MASS INDEX: 39.62 KG/M2 | RESPIRATION RATE: 17 BRPM | WEIGHT: 267.5 LBS

## 2021-06-15 LAB
ALBUMIN SERPL-MCNC: 2.6 GM/DL (ref 3.4–5)
ANION GAP SERPL CALCULATED.3IONS-SCNC: 9 MMOL/L (ref 4–16)
BUN BLDV-MCNC: 37 MG/DL (ref 6–23)
CALCIUM SERPL-MCNC: 8.4 MG/DL (ref 8.3–10.6)
CHLORIDE BLD-SCNC: 109 MMOL/L (ref 99–110)
CO2: 22 MMOL/L (ref 21–32)
CREAT SERPL-MCNC: 1.8 MG/DL (ref 0.9–1.3)
GFR AFRICAN AMERICAN: 49 ML/MIN/1.73M2
GFR NON-AFRICAN AMERICAN: 41 ML/MIN/1.73M2
GLUCOSE BLD-MCNC: 151 MG/DL (ref 70–99)
GLUCOSE BLD-MCNC: 167 MG/DL (ref 70–99)
GLUCOSE BLD-MCNC: 182 MG/DL (ref 70–99)
HCT VFR BLD CALC: 26.6 % (ref 42–52)
HEMOGLOBIN: 8.5 GM/DL (ref 13.5–18)
MCH RBC QN AUTO: 28.7 PG (ref 27–31)
MCHC RBC AUTO-ENTMCNC: 32 % (ref 32–36)
MCV RBC AUTO: 89.9 FL (ref 78–100)
PDW BLD-RTO: 14.1 % (ref 11.7–14.9)
PHOSPHORUS: 4.6 MG/DL (ref 2.5–4.9)
PLATELET # BLD: 275 K/CU MM (ref 140–440)
PMV BLD AUTO: 9.9 FL (ref 7.5–11.1)
POTASSIUM SERPL-SCNC: 5.3 MMOL/L (ref 3.5–5.1)
RBC # BLD: 2.96 M/CU MM (ref 4.6–6.2)
SODIUM BLD-SCNC: 140 MMOL/L (ref 135–145)
WBC # BLD: 7.3 K/CU MM (ref 4–10.5)

## 2021-06-15 PROCEDURE — 6360000002 HC RX W HCPCS: Performed by: INTERNAL MEDICINE

## 2021-06-15 PROCEDURE — 94150 VITAL CAPACITY TEST: CPT

## 2021-06-15 PROCEDURE — 94761 N-INVAS EAR/PLS OXIMETRY MLT: CPT

## 2021-06-15 PROCEDURE — 36415 COLL VENOUS BLD VENIPUNCTURE: CPT

## 2021-06-15 PROCEDURE — 6360000002 HC RX W HCPCS: Performed by: FAMILY MEDICINE

## 2021-06-15 PROCEDURE — 6370000000 HC RX 637 (ALT 250 FOR IP): Performed by: NURSE PRACTITIONER

## 2021-06-15 PROCEDURE — 82962 GLUCOSE BLOOD TEST: CPT

## 2021-06-15 PROCEDURE — 99232 SBSQ HOSP IP/OBS MODERATE 35: CPT | Performed by: SURGERY

## 2021-06-15 PROCEDURE — 2580000003 HC RX 258: Performed by: FAMILY MEDICINE

## 2021-06-15 PROCEDURE — 2580000003 HC RX 258: Performed by: INTERNAL MEDICINE

## 2021-06-15 PROCEDURE — 6370000000 HC RX 637 (ALT 250 FOR IP): Performed by: FAMILY MEDICINE

## 2021-06-15 PROCEDURE — 85027 COMPLETE CBC AUTOMATED: CPT

## 2021-06-15 PROCEDURE — 80069 RENAL FUNCTION PANEL: CPT

## 2021-06-15 RX ORDER — OXYCODONE HYDROCHLORIDE AND ACETAMINOPHEN 5; 325 MG/1; MG/1
1 TABLET ORAL EVERY 8 HOURS PRN
Qty: 10 TABLET | Refills: 0 | Status: SHIPPED | OUTPATIENT
Start: 2021-06-15 | End: 2021-06-20

## 2021-06-15 RX ORDER — AMLODIPINE BESYLATE 10 MG/1
10 TABLET ORAL DAILY
Qty: 30 TABLET | Refills: 3 | Status: SHIPPED | OUTPATIENT
Start: 2021-06-15 | End: 2022-08-03

## 2021-06-15 RX ORDER — ATORVASTATIN CALCIUM 40 MG/1
40 TABLET, FILM COATED ORAL NIGHTLY
Qty: 30 TABLET | Refills: 3 | Status: SHIPPED | OUTPATIENT
Start: 2021-06-15 | End: 2022-09-12

## 2021-06-15 RX ORDER — RIVAROXABAN 10 MG/1
10 TABLET, FILM COATED ORAL
Qty: 30 TABLET | Refills: 1 | Status: ON HOLD
Start: 2021-06-15 | End: 2021-10-01 | Stop reason: HOSPADM

## 2021-06-15 RX ORDER — FLUCONAZOLE 200 MG/1
200 TABLET ORAL WEEKLY
Qty: 1 TABLET | Refills: 0 | Status: SHIPPED | OUTPATIENT
Start: 2021-06-18 | End: 2021-06-19

## 2021-06-15 RX ORDER — CHLORTHALIDONE 25 MG/1
25 TABLET ORAL DAILY
Qty: 30 TABLET | Refills: 3 | Status: SHIPPED | OUTPATIENT
Start: 2021-06-15 | End: 2022-08-03

## 2021-06-15 RX ORDER — OXYCODONE HYDROCHLORIDE AND ACETAMINOPHEN 5; 325 MG/1; MG/1
1 TABLET ORAL EVERY 6 HOURS PRN
Qty: 12 TABLET | Refills: 0 | Status: SHIPPED | OUTPATIENT
Start: 2021-06-15 | End: 2021-06-15

## 2021-06-15 RX ADMIN — OXYCODONE HYDROCHLORIDE AND ACETAMINOPHEN 1 TABLET: 5; 325 TABLET ORAL at 08:20

## 2021-06-15 RX ADMIN — AMLODIPINE BESYLATE 10 MG: 10 TABLET ORAL at 08:18

## 2021-06-15 RX ADMIN — SERTRALINE HYDROCHLORIDE 100 MG: 100 TABLET ORAL at 08:18

## 2021-06-15 RX ADMIN — TAMSULOSIN HYDROCHLORIDE 0.4 MG: 0.4 CAPSULE ORAL at 08:18

## 2021-06-15 RX ADMIN — INSULIN LISPRO 1 UNITS: 100 INJECTION, SOLUTION INTRAVENOUS; SUBCUTANEOUS at 08:21

## 2021-06-15 RX ADMIN — ASPIRIN 81 MG: 81 TABLET, COATED ORAL at 08:18

## 2021-06-15 RX ADMIN — METOPROLOL TARTRATE 25 MG: 25 TABLET, FILM COATED ORAL at 08:18

## 2021-06-15 RX ADMIN — HEPARIN SODIUM 5000 UNITS: 5000 INJECTION INTRAVENOUS; SUBCUTANEOUS at 04:41

## 2021-06-15 RX ADMIN — MEROPENEM 1000 MG: 1 INJECTION, POWDER, FOR SOLUTION INTRAVENOUS at 04:39

## 2021-06-15 RX ADMIN — MEROPENEM 1000 MG: 1 INJECTION, POWDER, FOR SOLUTION INTRAVENOUS at 13:05

## 2021-06-15 RX ADMIN — SODIUM CHLORIDE, PRESERVATIVE FREE 10 ML: 5 INJECTION INTRAVENOUS at 08:18

## 2021-06-15 RX ADMIN — COLLAGENASE SANTYL: 250 OINTMENT TOPICAL at 13:18

## 2021-06-15 RX ADMIN — INSULIN LISPRO 1 UNITS: 100 INJECTION, SOLUTION INTRAVENOUS; SUBCUTANEOUS at 12:02

## 2021-06-15 ASSESSMENT — ENCOUNTER SYMPTOMS
RESPIRATORY NEGATIVE: 1
EYES NEGATIVE: 1
ALLERGIC/IMMUNOLOGIC NEGATIVE: 1
GASTROINTESTINAL NEGATIVE: 1

## 2021-06-15 ASSESSMENT — PAIN SCALES - GENERAL
PAINLEVEL_OUTOF10: 0
PAINLEVEL_OUTOF10: 7

## 2021-06-15 NOTE — FLOWSHEET NOTE
Patient given discharge instructions, voices understanding. Patient/spouse inquiring about walker for patient, CM notified of request.  Patient has percocet RX from [facility]. Patient ready for discharge from nursing standpoint.

## 2021-06-15 NOTE — PROGRESS NOTES
Asking Betty Montoya if pt can start Ertapenem tomorrow and she was agreeable. Notified [facility] and [employer] to delivery tonight so [facility] can start tomorrow. Also added anaphylaxis kit d/t med would be a 1st dose in the home. Chris Zheng was in agreement with the above.

## 2021-06-15 NOTE — PROGRESS NOTES
CLINICAL PHARMACY NOTE: MEDS TO BEDS    Total # of Prescriptions Filled: 7   The following medications were delivered to the patient:  · Atorvastatin 40mg  · Xarelto 10mg  · Amlodipine 10mg  · Chlorthalidone 25mg  · Fluconazole 200mg  · Metoprolol tartrate 25mg  · Oxycodone/apap 5/325mg    Additional Documentation:

## 2021-06-15 NOTE — CARE COORDINATION
CM was notified that pt will need a walker prior to discharge. Briseyda Diaz was evaluated today and a DME order was entered for a wheeled walker because he requires this to successfully complete daily living tasks of eating, bathing, toileting, personal cares, ambulating, grooming and hygiene. A wheeled walker is necessary due to the patient's unsteady gait, upper body weakness, and inability to  an ambulation device; and he can ambulate only by pushing a walker instead of a lesser assistive device such as a cane, crutch, or standard walker. The need for this equipment was discussed with the patient and he understands and is in agreement.

## 2021-06-15 NOTE — DISCHARGE SUMMARY
Patient: Jill Covarrubias MD      Gender: male  : 1975   Age: 55 y.o. MRN: [id number]    Admitting Physician: Leah Mccallum MD  Discharge Physician: Leah Mccallum MD     Code Status: Full Code     Admit Date: 2021   Discharge Date: 06/15/21      Disposition:  Home       Condition at Discharge:  stable . Follow-up appointments:  f/u one week with PCP , and with consultants as recommended . Outpatient to do list: f/u     Pt`s preferred phone number :[phone]  [facility]  Extended Emergency Contact Information  Primary Emergency Contact: [facility]  Address: [address] [place] [address] Aidan Satin[address] Phone: [phone]  Mobile Phone: [phone]  Relation: Spouse  Secondary Emergency Contact: Elbert Joy, [address] [facility]  Home Phone: [phone]  Mobile Phone: [phone]  Relation: Parent      Discharge Diagnoses: Active Hospital Problems    Diagnosis     Right groin wound [S31.109A]     Ulcer of right groin with fat layer exposed ([address] [L98.492]     Syncope and collapse [R55]    DM II uncontrolled   ARF on CKD   Depression   Recent h/o Irene abscess s/p debridement on IV Vancomycin   Elevated troponin       Chief Complaint   Patient presents with    Wound Check     Open wound to scrotum. Had a coughing fit at home and was incontinent and got stool in his wound     History of Present Illness: Luisa Weaver is a 55 y.o. male , pt with past medical history of uncontrolled DM with A1c 9.4 , Diabetic nephropathy ([address] Diabetic neuropathy ([address] Diabetic ulcer of left midfoot associated with type 2 diabetes mellitus, with fat layer exposed Hyperlipidemia , Hypertension, Peripheral autonomic neuropathy due to diabetes mellitus with resent groin necrotizing fascitis post surgical repair was discharged on IV ABX and wound care .    Pt presented to ER for evaluation for syncope as it happened twice earlier today , one episode associated with cough and the other one after pt stood up and walked to the bathroom and end up fainting . Around that time pt denied any CP, SOB, palpitation or blurred vision . Pt on IV Vancomycin and blood work data in ER revealed ARF with creatinine up to 2.4 . Also troponin elevated but no EKG change . Pt stated that after he fainted he realized that he lost control on his bowel and defecated and stool came inside his debridement site where Irene abscess was treated   History obtained from patient . In ER lab data revealed   And XR . And CT revealed   History obtained from patient . Hospital Course:   Tele   IVF , monitor renal function , hold diuretics , nephro consul t   cardiac markers, cardiologist consult   Glucose control, insulin coverage   Cont IV ABX Vancomycin and PO flagyl   Pain control   Home meds , reviewed and resumed as appropriate   Symptoms releif/Pain control   DVT proph         Consults.   IP CONSULT TO INTERNAL MEDICINE  IP CONSULT TO NEPHROLOGY  IP CONSULT TO CARDIOLOGY  IP CONSULT TO PHARMACY  IP CONSULT TO SPIRITUAL SERVICES  IP CONSULT TO GENERAL SURGERY  IP CONSULT TO INFECTIOUS DISEASES  IP CONSULT TO IV TEAM  IP CONSULT TO HOME CARE NEEDS        Discharge Medications:   Current Discharge Medication List      START taking these medications    Details   fluconazole (DIFLUCAN) 200 MG tablet Take 1 tablet by mouth once a week for 1 dose  Qty: 1 tablet, Refills: 0      atorvastatin (LIPITOR) 40 MG tablet Take 1 tablet by mouth nightly  Qty: 30 tablet, Refills: 3      metoprolol tartrate (LOPRESSOR) 25 MG tablet Take 1 tablet by mouth 2 times daily  Qty: 60 tablet, Refills: 3      amLODIPine (NORVASC) 10 MG tablet Take 1 tablet by mouth daily  Qty: 30 tablet, Refills: 3      sodium chloride 0.9 % SOLN 50 mL with ertapenem 1 GM SOLR 1,000 mg Infuse 1,000 mg intravenously every 24 hours for 8 days  Qty: 8 g, Refills: 0      rivaroxaban (Rich Cadet) kg/m² as calculated from the following:    Height as of this encounter: 5' 9\" (1.753 m). Weight as of this encounter: 267 lb 8 oz (121.3 kg). /76   Pulse 72   Temp 97.4 °F (36.3 °C) (Oral)   Resp 17   Ht 5' 9\" (1.753 m)   Wt 267 lb 8 oz (121.3 kg)   SpO2 95%   BMI 39.50 kg/m²   General appearance:  NAD  Heart[de-identified] Normal s1/s2, RRR, no murmurs, gallops, or rubs. No leg edema  Lungs:  Clear to auscultation, bilaterally without Rales/Wheezes/Rhonchi. Abdomen: Soft, non-tender, non-distended, bowel sounds present  Musculoskeletal:   no cyanosis, no edema  Neurologic:  Cranial nerves: II-XII intact, grossly non-focal.  Psychiatric:  A & O x3      Labs:  For convenience and continuity at follow-up the following most recent labs are provided:    Lab Results   Component Value Date    WBC 7.3 06/15/2021    HGB 8.5 06/15/2021    HCT 26.6 06/15/2021    MCV 89.9 06/15/2021     06/15/2021     06/15/2021    K 5.3 06/15/2021     06/15/2021    CO2 22 06/15/2021    BUN 37 06/15/2021    CREATININE 1.8 06/15/2021    CALCIUM 8.4 06/15/2021    PHOS 4.6 06/15/2021    BNP 10 05/01/2013    ALKPHOS 62 06/12/2021    ALT 9 06/12/2021    AST 11 06/12/2021    BILITOT 0.1 06/12/2021    LABALBU 2.6 06/15/2021    LABALBU 48 03/27/2021    LDLCALC 92 07/24/2017    TRIG 188 05/13/2021     Lab Results   Component Value Date    INR 1.02 03/22/2019    INR 1.21 09/15/2018    INR 0.99 05/01/2013       Results for orders placed or performed during the hospital encounter of 06/05/21   Culture, Wound    Specimen: Abdomen   Result Value Ref Range    Specimen ABDOMEN     Special Requests Right groin     Culture       Final Report Mixed skin alexsander,  Light growth No further workup No anaerobes isolated    Culture ESCHERICHIA COLI Moderate growth (A)     Culture ENTEROBACTER CLOACAE COMPLEX Moderate growth (A)        Susceptibility    Escherichia coli - BACTERIAL SUSCEPTIBILITY PANEL PAMELA     ampicillin <=8 Sensitive ceFAZolin <=2 Sensitive      cefepime <=2 Sensitive      cefuroxime <=4 Sensitive      ciprofloxacin <=1 Sensitive      ertapenem <=0.5 Sensitive      gentamicin <=4 Sensitive      meropenem <=1 Sensitive      piperacillin-tazobactam <=16 Sensitive      trimethoprim-sulfamethoxazole <=2/38 Sensitive     Enterobacter cloacae complex - BACTERIAL SUSCEPTIBILITY PANEL PAMELA     ampicillin >16 Resistant      ceFAZolin >16 Resistant      cefepime <=2 Sensitive      cefuroxime >16 Resistant      ciprofloxacin <=1 Sensitive      ertapenem <=0.5 Sensitive      gentamicin <=4 Sensitive      meropenem <=1 Sensitive      piperacillin-tazobactam <=16 Sensitive      trimethoprim-sulfamethoxazole <=2/38 Sensitive      amoxicillin-clavulanate (f) >16/8 Resistant    CBC Auto Differential   Result Value Ref Range    WBC 7.2 4.0 - 10.5 K/CU MM    RBC 3.16 (L) 4.6 - 6.2 M/CU MM    Hemoglobin 9.3 (L) 13.5 - 18.0 GM/DL    Hematocrit 28.1 (L) 42 - 52 %    MCV 88.9 78 - 100 FL    MCH 29.4 27 - 31 PG    MCHC 33.1 32.0 - 36.0 %    RDW 13.9 11.7 - 14.9 %    Platelets 178 417 - 154 K/CU MM    MPV 9.1 7.5 - 11.1 FL    Differential Type AUTOMATED DIFFERENTIAL     Segs Relative 76.2 (H) 36 - 66 %    Lymphocytes % 12.3 (L) 24 - 44 %    Monocytes % 8.7 (H) 0 - 4 %    Eosinophils % 1.7 0 - 3 %    Basophils % 0.7 0 - 1 %    Segs Absolute 5.5 K/CU MM    Lymphocytes Absolute 0.9 K/CU MM    Monocytes Absolute 0.6 K/CU MM    Eosinophils Absolute 0.1 K/CU MM    Basophils Absolute 0.1 K/CU MM    Nucleated RBC % 0.0 %    Total Nucleated RBC 0.0 K/CU MM    Total Immature Neutrophil 0.03 K/CU MM    Immature Neutrophil % 0.4 0 - 0.43 %   Basic Metabolic Panel w/ Reflex to MG   Result Value Ref Range    Sodium 136 135 - 145 MMOL/L    Potassium 4.4 3.5 - 5.1 MMOL/L    Chloride 104 99 - 110 mMol/L    CO2 20 (L) 21 - 32 MMOL/L    Anion Gap 12 4 - 16    BUN 51 (H) 6 - 23 MG/DL    CREATININE 2.5 (H) 0.9 - 1.3 MG/DL    Glucose 75 70 - 99 MG/DL    Calcium 8.4 8.3 - 10.6 MG/DL    GFR Non-African American 28 (L) >60 mL/min/1.73m2    GFR  34 (L) >60 mL/min/1.73m2   D-Dimer, Quantitative   Result Value Ref Range    D-Dimer, Quant 558 (H) <230 NG/mL(DDU)   Troponin   Result Value Ref Range    Troponin T 0.034 (H) <0.01 NG/ML   Lactate, Sepsis   Result Value Ref Range    Lactic Acid, Sepsis 1.4 0.5 - 1.9 mMOL/L   Urinalysis Reflex to Culture    Specimen: Urine   Result Value Ref Range    Color, UA YELLOW YELLOW    Clarity, UA SLIGHTLY CLOUDY (A) CLEAR    Glucose, Urine NEGATIVE NEGATIVE MG/DL    Bilirubin Urine NEGATIVE NEGATIVE MG/DL    Ketones, Urine NEGATIVE NEGATIVE MG/DL    Specific Gravity, UA 1.015 1.001 - 1.035    Blood, Urine SMALL (A) NEGATIVE    pH, Urine 5.0 5.0 - 8.0    Protein, UA >500 (HH) NEGATIVE MG/DL    Urobilinogen, Urine NEGATIVE 0.2 - 1.0 MG/DL    Nitrite Urine, Quantitative NEGATIVE NEGATIVE    Leukocyte Esterase, Urine TRACE (A) NEGATIVE    RBC, UA <1 0 - 3 /HPF    WBC, UA 2 0 - 2 /HPF    Bacteria, UA NEGATIVE NEGATIVE /HPF    Squam Epithel, UA 1 /HPF    Mucus, UA RARE (A) NEGATIVE HPF    Trichomonas, UA NONE SEEN NONE SEEN /HPF    Hyaline Casts, UA 5 /LPF   Troponin   Result Value Ref Range    Troponin T 0.022 (H) <0.01 NG/ML   Hemoglobin A1c   Result Value Ref Range    Hemoglobin A1C 8.6 (H) 4.2 - 6.3 %    eAG 200 mg/dL   Basic Metabolic Panel w/ Reflex to MG   Result Value Ref Range    Sodium 134 (L) 135 - 145 MMOL/L    Potassium 5.1 3.5 - 5.1 MMOL/L    Chloride 107 99 - 110 mMol/L    CO2 17 (L) 21 - 32 MMOL/L    Anion Gap 10 4 - 16    BUN 49 (H) 6 - 23 MG/DL    CREATININE 2.2 (H) 0.9 - 1.3 MG/DL    Glucose 120 (H) 70 - 99 MG/DL    Calcium 7.9 (L) 8.3 - 10.6 MG/DL    GFR Non- 32 (L) >60 mL/min/1.73m2    GFR  39 (L) >60 mL/min/1.73m2   CBC auto differential   Result Value Ref Range    WBC 4.7 4.0 - 10.5 K/CU MM    RBC 2.98 (L) 4.6 - 6.2 M/CU MM    Hemoglobin 8.9 (L) 13.5 - 18.0 GM/DL    Hematocrit 26.7 (L) 42 - 52 %    MCV 89.6 78 - 100 FL    MCH 29.9 27 - 31 PG    MCHC 33.3 32.0 - 36.0 %    RDW 14.3 11.7 - 14.9 %    Platelets 145 689 - 444 K/CU MM    MPV 9.0 7.5 - 11.1 FL    Differential Type AUTOMATED DIFFERENTIAL     Segs Relative 68.9 (H) 36 - 66 %    Lymphocytes % 15.9 (L) 24 - 44 %    Monocytes % 11.4 (H) 0 - 4 %    Eosinophils % 2.8 0 - 3 %    Basophils % 0.6 0 - 1 %    Segs Absolute 3.2 K/CU MM    Lymphocytes Absolute 0.7 K/CU MM    Monocytes Absolute 0.5 K/CU MM    Eosinophils Absolute 0.1 K/CU MM    Basophils Absolute 0.0 K/CU MM    Nucleated RBC % 0.0 %    Total Nucleated RBC 0.0 K/CU MM    Total Immature Neutrophil 0.02 K/CU MM    Immature Neutrophil % 0.4 0 - 0.43 %   Vancomycin, random   Result Value Ref Range    Vancomycin Rm 20.5 UG/ML    DOSE AMOUNT DOSE AMT. GIVEN - 1,500 mg     DOSE TIME DOSE TIME GIVEN - UNKNOWN    Basic Metabolic Panel w/ Reflex to MG   Result Value Ref Range    Sodium 137 135 - 145 MMOL/L    Potassium 4.5 3.5 - 5.1 MMOL/L    Chloride 110 99 - 110 mMol/L    CO2 18 (L) 21 - 32 MMOL/L    Anion Gap 9 4 - 16    BUN 44 (H) 6 - 23 MG/DL    CREATININE 2.0 (H) 0.9 - 1.3 MG/DL    Glucose 123 (H) 70 - 99 MG/DL    Calcium 7.4 (L) 8.3 - 10.6 MG/DL    GFR Non- 36 (L) >60 mL/min/1.73m2    GFR  44 (L) >60 mL/min/1.73m2   CBC   Result Value Ref Range    WBC 5.0 4.0 - 10.5 K/CU MM    RBC 2.68 (L) 4.6 - 6.2 M/CU MM    Hemoglobin 7.8 (L) 13.5 - 18.0 GM/DL    Hematocrit 24.3 (L) 42 - 52 %    MCV 90.7 78 - 100 FL    MCH 29.1 27 - 31 PG    MCHC 32.1 32.0 - 36.0 %    RDW 13.9 11.7 - 14.9 %    Platelets 330 754 - 106 K/CU MM    MPV 9.4 7.5 - 11.1 FL   Vancomycin, random   Result Value Ref Range    Vancomycin Rm 14.0 UG/ML    DOSE AMOUNT DOSE AMT.  GIVEN - 1,500 mg     DOSE TIME DOSE TIME GIVEN - UNKNOWN    Phosphorus   Result Value Ref Range    Phosphorus 4.5 2.5 - 4.9 MG/DL   Magnesium   Result Value Ref Range    Magnesium 2.4 1.8 - 2.4 mg/dl   CBC   Result Value Ref Range    WBC 4.6 4.0 - 10.5 K/CU MM    RBC 2.80 (L) 4.6 - 6.2 M/CU MM    Hemoglobin 8.0 (L) 13.5 - 18.0 GM/DL    Hematocrit 25.1 (L) 42 - 52 %    MCV 89.6 78 - 100 FL    MCH 28.6 27 - 31 PG    MCHC 31.9 (L) 32.0 - 36.0 %    RDW 13.8 11.7 - 14.9 %    Platelets 312 575 - 435 K/CU MM    MPV 9.7 7.5 - 11.1 FL   Comprehensive Metabolic Panel   Result Value Ref Range    Sodium 135 135 - 145 MMOL/L    Potassium 5.0 3.5 - 5.1 MMOL/L    Chloride 108 99 - 110 mMol/L    CO2 19 (L) 21 - 32 MMOL/L    BUN 40 (H) 6 - 23 MG/DL    CREATININE 2.1 (H) 0.9 - 1.3 MG/DL    Glucose 168 (H) 70 - 99 MG/DL    Calcium 8.0 (L) 8.3 - 10.6 MG/DL    Albumin 2.6 (L) 3.4 - 5.0 GM/DL    Total Protein 5.4 (L) 6.4 - 8.2 GM/DL    Total Bilirubin 0.2 0.0 - 1.0 MG/DL    ALT 8 (L) 10 - 40 U/L    AST 12 (L) 15 - 37 IU/L    Alkaline Phosphatase 62 40 - 128 IU/L    GFR Non- 34 (L) >60 mL/min/1.73m2    GFR  41 (L) >60 mL/min/1.73m2    Anion Gap 8 4 - 16   Vancomycin, random   Result Value Ref Range    Vancomycin Rm 17.9 UG/ML    DOSE AMOUNT DOSE AMT.  GIVEN - `     DOSE TIME DOSE TIME GIVEN - `    Basic Metabolic Panel w/ Reflex to MG   Result Value Ref Range    Sodium 137 135 - 145 MMOL/L    Potassium 5.6 (HH) 3.5 - 5.1 MMOL/L    Chloride 108 99 - 110 mMol/L    CO2 19 (L) 21 - 32 MMOL/L    Anion Gap 10 4 - 16    BUN 42 (H) 6 - 23 MG/DL    CREATININE 2.5 (H) 0.9 - 1.3 MG/DL    Glucose 191 (H) 70 - 99 MG/DL    Calcium 8.3 8.3 - 10.6 MG/DL    GFR Non-African American 28 (L) >60 mL/min/1.73m2    GFR  34 (L) >60 mL/min/1.73m2   CBC   Result Value Ref Range    WBC 5.5 4.0 - 10.5 K/CU MM    RBC 2.99 (L) 4.6 - 6.2 M/CU MM    Hemoglobin 8.8 (L) 13.5 - 18.0 GM/DL    Hematocrit 26.7 (L) 42 - 52 %    MCV 89.3 78 - 100 FL    MCH 29.4 27 - 31 PG    MCHC 33.0 32.0 - 36.0 %    RDW 14.1 11.7 - 14.9 %    Platelets 762 169 - 394 K/CU MM    MPV 9.2 7.5 - 11.1 FL   C-Reactive Protein   Result Value Ref Range    CRP, High Sensitivity 9.9 mg/L Sedimentation Rate   Result Value Ref Range    Sed Rate >120 (H) 0 - 15 MM/HR   Procalcitonin   Result Value Ref Range    Procalcitonin 0.314    Urinalysis   Result Value Ref Range    Color, UA YELLOW YELLOW    Clarity, UA CLEAR CLEAR    Glucose, Urine 50 (A) NEGATIVE MG/DL    Bilirubin Urine NEGATIVE NEGATIVE MG/DL    Ketones, Urine NEGATIVE NEGATIVE MG/DL    Specific Gravity, UA 1.014 1.001 - 1.035    Blood, Urine SMALL (A) NEGATIVE    pH, Urine 5.0 5.0 - 8.0    Protein, UA >500 (HH) NEGATIVE MG/DL    Urobilinogen, Urine NEGATIVE 0.2 - 1.0 MG/DL    Nitrite Urine, Quantitative NEGATIVE NEGATIVE    Leukocyte Esterase, Urine NEGATIVE NEGATIVE    RBC, UA <1 0 - 3 /HPF    WBC, UA 1 0 - 2 /HPF    Bacteria, UA NEGATIVE NEGATIVE /HPF    Trichomonas, UA NONE SEEN NONE SEEN /HPF   Basic metabolic panel   Result Value Ref Range    Sodium 134 (L) 135 - 145 MMOL/L    Potassium 4.9 3.5 - 5.1 MMOL/L    Chloride 105 99 - 110 mMol/L    CO2 20 (L) 21 - 32 MMOL/L    Anion Gap 9 4 - 16    BUN 36 (H) 6 - 23 MG/DL    CREATININE 2.3 (H) 0.9 - 1.3 MG/DL    Glucose 147 (H) 70 - 99 MG/DL    Calcium 8.3 8.3 - 10.6 MG/DL    GFR Non- 31 (L) >60 mL/min/1.73m2    GFR  37 (L) >60 mL/min/1.73m2   CBC   Result Value Ref Range    WBC 6.2 4.0 - 10.5 K/CU MM    RBC 2.99 (L) 4.6 - 6.2 M/CU MM    Hemoglobin 8.4 (L) 13.5 - 18.0 GM/DL    Hematocrit 26.6 (L) 42 - 52 %    MCV 89.0 78 - 100 FL    MCH 28.1 27 - 31 PG    MCHC 31.6 (L) 32.0 - 36.0 %    RDW 14.1 11.7 - 14.9 %    Platelets 962 439 - 009 K/CU MM    MPV 9.9 7.5 - 11.1 FL   C-Reactive Protein   Result Value Ref Range    CRP, High Sensitivity 7.4 mg/L   Comprehensive Metabolic Panel   Result Value Ref Range    Sodium 137 135 - 145 MMOL/L    Potassium 5.0 3.5 - 5.1 MMOL/L    Chloride 107 99 - 110 mMol/L    CO2 19 (L) 21 - 32 MMOL/L    BUN 42 (H) 6 - 23 MG/DL    CREATININE 2.3 (H) 0.9 - 1.3 MG/DL    Glucose 186 (H) 70 - 99 MG/DL    Calcium 8.6 8.3 - 10.6 MG/DL Albumin 2.5 (L) 3.4 - 5.0 GM/DL    Total Protein 6.3 (L) 6.4 - 8.2 GM/DL    Total Bilirubin 0.2 0.0 - 1.0 MG/DL    ALT 7 (L) 10 - 40 U/L    AST 9 (L) 15 - 37 IU/L    Alkaline Phosphatase 60 40 - 128 IU/L    GFR Non- 31 (L) >60 mL/min/1.73m2    GFR  37 (L) >60 mL/min/1.73m2    Anion Gap 11 4 - 16   Phosphorus   Result Value Ref Range    Phosphorus 4.6 2.5 - 4.9 MG/DL   Magnesium   Result Value Ref Range    Magnesium 2.4 1.8 - 2.4 mg/dl   CBC   Result Value Ref Range    WBC 5.2 4.0 - 10.5 K/CU MM    RBC 2.77 (L) 4.6 - 6.2 M/CU MM    Hemoglobin 8.2 (L) 13.5 - 18.0 GM/DL    Hematocrit 24.0 (L) 42 - 52 %    MCV 86.6 78 - 100 FL    MCH 29.6 27 - 31 PG    MCHC 34.2 32.0 - 36.0 %    RDW 13.9 11.7 - 14.9 %    Platelets 398 762 - 989 K/CU MM    MPV 9.5 7.5 - 11.1 FL   C-Reactive Protein   Result Value Ref Range    CRP, High Sensitivity 6.3 mg/L   Procalcitonin   Result Value Ref Range    Procalcitonin 0.210    Comprehensive Metabolic Panel   Result Value Ref Range    Sodium 139 135 - 145 MMOL/L    Potassium 4.8 3.5 - 5.1 MMOL/L    Chloride 108 99 - 110 mMol/L    CO2 21 21 - 32 MMOL/L    BUN 41 (H) 6 - 23 MG/DL    CREATININE 2.3 (H) 0.9 - 1.3 MG/DL    Glucose 208 (H) 70 - 99 MG/DL    Calcium 8.3 8.3 - 10.6 MG/DL    Albumin 2.9 (L) 3.4 - 5.0 GM/DL    Total Protein 5.9 (L) 6.4 - 8.2 GM/DL    Total Bilirubin 0.1 0.0 - 1.0 MG/DL    ALT 9 (L) 10 - 40 U/L    AST 11 (L) 15 - 37 IU/L    Alkaline Phosphatase 66 40 - 129 IU/L    GFR Non- 31 (L) >60 mL/min/1.73m2    GFR  37 (L) >60 mL/min/1.73m2    Anion Gap 10 4 - 16   Phosphorus   Result Value Ref Range    Phosphorus 5.7 (H) 2.5 - 4.9 MG/DL   Magnesium   Result Value Ref Range    Magnesium 2.4 1.8 - 2.4 mg/dl   CBC   Result Value Ref Range    WBC 7.2 4.0 - 10.5 K/CU MM    RBC 2.83 (L) 4.6 - 6.2 M/CU MM    Hemoglobin 8.1 (L) 13.5 - 18.0 GM/DL    Hematocrit 25.6 (L) 42 - 52 %    MCV 90.5 78 - 100 FL    MCH 28.6 27 - 31 PG MCHC 31.6 (L) 32.0 - 36.0 %    RDW 14.1 11.7 - 14.9 %    Platelets 086 057 - 947 K/CU MM    MPV 9.5 7.5 - 11.1 FL   Comprehensive Metabolic Panel   Result Value Ref Range    Sodium 135 135 - 145 MMOL/L    Potassium 5.1 3.5 - 5.1 MMOL/L    Chloride 109 99 - 110 mMol/L    CO2 19 (L) 21 - 32 MMOL/L    BUN 37 (H) 6 - 23 MG/DL    CREATININE 2.0 (H) 0.9 - 1.3 MG/DL    Glucose 187 (H) 70 - 99 MG/DL    Calcium 8.1 (L) 8.3 - 10.6 MG/DL    Albumin 2.7 (L) 3.4 - 5.0 GM/DL    Total Protein 5.8 (L) 6.4 - 8.2 GM/DL    Total Bilirubin 0.1 0.0 - 1.0 MG/DL    ALT 9 (L) 10 - 40 U/L    AST 11 (L) 15 - 37 IU/L    Alkaline Phosphatase 62 40 - 129 IU/L    GFR Non- 36 (L) >60 mL/min/1.73m2    GFR  44 (L) >60 mL/min/1.73m2    Anion Gap 7 4 - 16   CBC   Result Value Ref Range    WBC 6.5 4.0 - 10.5 K/CU MM    RBC 2.94 (L) 4.6 - 6.2 M/CU MM    Hemoglobin 8.8 (L) 13.5 - 18.0 GM/DL    Hematocrit 25.1 (L) 42 - 52 %    MCV 85.4 78 - 100 FL    MCH 29.9 27 - 31 PG    MCHC 35.1 32.0 - 36.0 %    RDW 14.1 11.7 - 14.9 %    Platelets 139 213 - 297 K/CU MM    MPV 9.4 7.5 - 11.1 FL   Procalcitonin   Result Value Ref Range    Procalcitonin 0.185    Renal Function Panel   Result Value Ref Range    Sodium 138 135 - 145 MMOL/L    Potassium 4.7 3.5 - 5.1 MMOL/L    Chloride 108 99 - 110 mMol/L    CO2 23 21 - 32 MMOL/L    Anion Gap 7 4 - 16    BUN 35 (H) 6 - 23 MG/DL    CREATININE 1.8 (H) 0.9 - 1.3 MG/DL    Glucose 199 (H) 70 - 99 MG/DL    Calcium 8.6 8.3 - 10.6 MG/DL    GFR Non- 41 (L) >60 mL/min/1.73m2    GFR  49 (L) >60 mL/min/1.73m2    Albumin 2.7 (L) 3.4 - 5.0 GM/DL    Phosphorus 4.2 2.5 - 4.9 MG/DL   Renal Function Panel   Result Value Ref Range    Sodium 141 135 - 145 MMOL/L    Potassium 4.7 3.5 - 5.1 MMOL/L    Chloride 110 99 - 110 mMol/L    CO2 22 21 - 32 MMOL/L    Anion Gap 9 4 - 16    BUN 34 (H) 6 - 23 MG/DL    CREATININE 1.7 (H) 0.9 - 1.3 MG/DL    Glucose 168 (H) 70 - 99 MG/DL    Calcium 8.3 8.3 - 10.6 MG/DL    GFR Non- 44 (L) >60 mL/min/1.73m2    GFR  53 (L) >60 mL/min/1.73m2    Albumin 2.4 (L) 3.4 - 5.0 GM/DL    Phosphorus 4.1 2.5 - 4.9 MG/DL   CBC   Result Value Ref Range    WBC 7.1 4.0 - 10.5 K/CU MM    RBC 3.08 (L) 4.6 - 6.2 M/CU MM    Hemoglobin 9.1 (L) 13.5 - 18.0 GM/DL    Hematocrit 27.2 (L) 42 - 52 %    MCV 88.3 78 - 100 FL    MCH 29.5 27 - 31 PG    MCHC 33.5 32.0 - 36.0 %    RDW 14.0 11.7 - 14.9 %    Platelets 544 543 - 074 K/CU MM    MPV 9.1 7.5 - 11.1 FL   CBC   Result Value Ref Range    WBC 7.3 4.0 - 10.5 K/CU MM    RBC 2.96 (L) 4.6 - 6.2 M/CU MM    Hemoglobin 8.5 (L) 13.5 - 18.0 GM/DL    Hematocrit 26.6 (L) 42 - 52 %    MCV 89.9 78 - 100 FL    MCH 28.7 27 - 31 PG    MCHC 32.0 32.0 - 36.0 %    RDW 14.1 11.7 - 14.9 %    Platelets 360 822 - 141 K/CU MM    MPV 9.9 7.5 - 11.1 FL   Renal Function Panel   Result Value Ref Range    Sodium 140 135 - 145 MMOL/L    Potassium 5.3 (H) 3.5 - 5.1 MMOL/L    Chloride 109 99 - 110 mMol/L    CO2 22 21 - 32 MMOL/L    Anion Gap 9 4 - 16    BUN 37 (H) 6 - 23 MG/DL    CREATININE 1.8 (H) 0.9 - 1.3 MG/DL    Glucose 167 (H) 70 - 99 MG/DL    Calcium 8.4 8.3 - 10.6 MG/DL    GFR Non- 41 (L) >60 mL/min/1.73m2    GFR  49 (L) >60 mL/min/1.73m2    Albumin 2.6 (L) 3.4 - 5.0 GM/DL    Phosphorus 4.6 2.5 - 4.9 MG/DL   POCT Glucose   Result Value Ref Range    Glucose 158 mg/dL   POCT Glucose   Result Value Ref Range    POC Glucose 158 (H) 70 - 99 MG/DL   POCT glucose   Result Value Ref Range    Glucose 118 mg/dL    QC OK? ok    POCT Glucose   Result Value Ref Range    POC Glucose 142 (H) 70 - 99 MG/DL   POCT Glucose   Result Value Ref Range    POC Glucose 125 (H) 70 - 99 MG/DL   POCT Glucose   Result Value Ref Range    POC Glucose 118 (H) 70 - 99 MG/DL   POCT Glucose   Result Value Ref Range    POC Glucose 206 (H) 70 - 99 MG/DL   POCT Glucose   Result Value Ref Range    POC Glucose 171 (H) 70 - 99 MG/DL   POCT Glucose   Result Value Ref Range    POC Glucose 141 (H) 70 - 99 MG/DL   POCT Glucose   Result Value Ref Range    POC Glucose 137 (H) 70 - 99 MG/DL   POCT Glucose   Result Value Ref Range    POC Glucose 165 (H) 70 - 99 MG/DL   POCT Glucose   Result Value Ref Range    POC Glucose 200 (H) 70 - 99 MG/DL   POCT Glucose   Result Value Ref Range    POC Glucose 173 (H) 70 - 99 MG/DL   POCT Glucose   Result Value Ref Range    POC Glucose 192 (H) 70 - 99 MG/DL   POCT Glucose   Result Value Ref Range    POC Glucose 329 (H) 70 - 99 MG/DL   POCT Glucose   Result Value Ref Range    POC Glucose 189 (H) 70 - 99 MG/DL   POCT Glucose   Result Value Ref Range    POC Glucose 197 (H) 70 - 99 MG/DL   POCT Glucose   Result Value Ref Range    POC Glucose 170 (H) 70 - 99 MG/DL   POCT Glucose   Result Value Ref Range    POC Glucose 170 (H) 70 - 99 MG/DL   POCT Glucose   Result Value Ref Range    POC Glucose 223 (H) 70 - 99 MG/DL   POCT Glucose   Result Value Ref Range    POC Glucose 262 (H) 70 - 99 MG/DL   POCT Glucose   Result Value Ref Range    POC Glucose 214 (H) 70 - 99 MG/DL   POCT Glucose   Result Value Ref Range    POC Glucose 197 (H) 70 - 99 MG/DL   POCT Glucose   Result Value Ref Range    POC Glucose 206 (H) 70 - 99 MG/DL   POCT Glucose   Result Value Ref Range    POC Glucose 178 (H) 70 - 99 MG/DL   POCT Glucose   Result Value Ref Range    POC Glucose 250 (H) 70 - 99 MG/DL   POCT Glucose   Result Value Ref Range    POC Glucose 224 (H) 70 - 99 MG/DL   POCT Glucose   Result Value Ref Range    POC Glucose 239 (H) 70 - 99 MG/DL   POCT Glucose   Result Value Ref Range    POC Glucose 167 (H) 70 - 99 MG/DL   POCT Glucose   Result Value Ref Range    POC Glucose 188 (H) 70 - 99 MG/DL   POCT Glucose   Result Value Ref Range    POC Glucose 200 (H) 70 - 99 MG/DL   POCT Glucose   Result Value Ref Range    POC Glucose 244 (H) 70 - 99 MG/DL   POCT Glucose   Result Value Ref Range    POC Glucose 172 (H) 70 - 99 MG/DL   POCT Glucose   Result Value Ref Range    POC Glucose 263 (H) 70 - 99 MG/DL   POCT Glucose   Result Value Ref Range    POC Glucose 197 (H) 70 - 99 MG/DL   POCT Glucose   Result Value Ref Range    POC Glucose 246 (H) 70 - 99 MG/DL   POCT Glucose   Result Value Ref Range    POC Glucose 215 (H) 70 - 99 MG/DL   POCT Glucose   Result Value Ref Range    POC Glucose 198 (H) 70 - 99 MG/DL   POCT Glucose   Result Value Ref Range    POC Glucose 161 (H) 70 - 99 MG/DL   POCT Glucose   Result Value Ref Range    POC Glucose 161 (H) 70 - 99 MG/DL   POCT Glucose   Result Value Ref Range    POC Glucose 240 (H) 70 - 99 MG/DL   POCT Glucose   Result Value Ref Range    POC Glucose 252 (H) 70 - 99 MG/DL   POCT Glucose   Result Value Ref Range    POC Glucose 182 (H) 70 - 99 MG/DL   POCT Glucose   Result Value Ref Range    POC Glucose 151 (H) 70 - 99 MG/DL   EKG 12 Lead   Result Value Ref Range    Ventricular Rate 85 BPM    Atrial Rate 85 BPM    P-R Interval 172 ms    QRS Duration 80 ms    Q-T Interval 378 ms    QTc Calculation (Bazett) 449 ms    P Axis 11 degrees    R Axis 28 degrees    T Axis 47 degrees    Diagnosis       Normal sinus rhythm  Normal ECG  When compared with ECG of 13-MAY-2021 00:40,  No significant change was found  Confirmed by [place] TOMASA PAK ([id number]) on 6/6/2021 1:27:20 PM     EKG 12 Lead   Result Value Ref Range    Ventricular Rate 74 BPM    Atrial Rate 74 BPM    P-R Interval 192 ms    QRS Duration 86 ms    Q-T Interval 392 ms    QTc Calculation (Bazett) 435 ms    P Axis 18 degrees    R Axis 20 degrees    T Axis 23 degrees    Diagnosis       Normal sinus rhythm  Normal ECG  When compared with ECG of 05-JUN-2021 17:07,  No significant change was found  Confirmed by [place] Yesenia PAK ([id number]) on 6/11/2021 10:18:45 AM           Chart review shows recent radiographs:  CT ABDOMEN PELVIS WO CONTRAST Additional Contrast? None    Result Date: 6/8/2021  EXAMINATION: CT OF THE ABDOMEN AND PELVIS WITHOUT CONTRAST 6/8/2021 3:37 pm TECHNIQUE: CT of the the wound itself). A focal fluid collection is not detected. A small left hydrocele persists, decreased when compared to the previous exam.  The gas and fluid collection seen previously within the right scrotum is no longer detected. There is scrotal wall thickening, especially on the right. That scrotal thickening has increased. The ischiorectal fat is clear. No extension of the inflammatory/infectious process into the pelvis itself is found. The prostate is unremarkable in appearance. Urinary bladder incompletely distended. There are small inguinal hernias containing fat only. No free pelvic fluid is found. Peritoneum/Retroperitoneum: Limited noncontrast imaging of the abdominal aorta is unremarkable. Shotty retroperitoneal lymph nodes are seen which are likely reactive. Bones/Soft Tissues: No osteolytic or osteoblastic bone lesions are identified. No acute bony abnormalities are seen. Redemonstration of debridement involving the rightward aspect of the scrotum and the perineum. The amount of fat stranding adjacent to the wound appears increased. However, the gas and fluid collection seen previously within the right scrotum is no longer detected. The bubbles of gas seen previously tracking from the wound also no longer detected. No gas/fluid collections are identified on the current examination to suggest abscess formation. Scrotal skin thickening, especially the right, increased when compared to the previous exam. Small left hydrocele, decreased when compared to the previous exam.     NM LUNG VENT/PERFUSION (VQ)    Result Date: 6/6/2021  EXAMINATION: NUCLEAR MEDICINE VENTILATION PERFUSION SCAN. 6/5/2021 TECHNIQUE: 51.9 millicuries aerosolized Tc99m DTPA was administered via mask prior to planar imaging of the lungs in multiple projections. Then, 5.5 millicuries of Tc 65A MAA was administered intravenously prior to planar imaging of the lungs in similar projections.  COMPARISON: Chest radiograph Anastasia Contrast?->None Decision Support Exception - unselect if not a suspected or confirmed emergency medical condition->Emergency Medical Condition (MA) Reason for Exam: hx of fourniers gangrene right testicle. worsening symptoms FINDINGS: Lower Chest: No focal infiltrate. Organs: The liver, gallbladder, spleen, pancreas and adrenal glands are unremarkable. Both kidneys are functional and there is no hydronephrosis. GI/Bowel: No bowel obstruction. The appendix is normal. Pelvis: Urinary bladder is unremarkable. Peritoneum/Retroperitoneum: The abdominal aorta is atherosclerotic, but non aneurysmal.  Small retroperitoneal lymph nodes measuring up to 8 mm in the left para-aortic region are similar in size to 2019, and therefore most likely reactive. There is a small fat containing umbilical hernia. Bones/Soft Tissues: There is mildly decreased, but ongoing gas and stranding within the soft tissues of the perineum and scrotum, primarily on the right. Findings are consistent with necrotizing fasciitis/Irene's gangrene. Previously demonstrated hydroceles are smaller. The right testicle appears to be more proximal within the right scrotum. More distally within the right scrotum is a 2.0 x 1.5 cm abscess which contains fluid and gas. A 1.4 cm, likely reactive right inguinal lymph node is stable. No acute osseous abnormality is seen. 1. Mildly improved, but persistent stranding and gas within the perineum and scrotum. Findings remain consistent with necrotizing fasciitis/Irene gangrene. 2. 2 cm right scrotal abscess. 3. Near complete resolution of the hydroceles, which were large on the prior exam. Results of this examination were discussed with Dr. Shad Menendez at 5:33 p.m. on 05/24/2021.      XR CHEST PORTABLE    Result Date: 6/5/2021  EXAMINATION: ONE XRAY VIEW OF THE CHEST 6/5/2021 5:19 pm COMPARISON: 04/03/2021, 03/21/2019 HISTORY: ORDERING SYSTEM PROVIDED HISTORY: syncope TECHNOLOGIST PROVIDED HISTORY: Reason for exam:->syncope Reason for Exam: syncope Acuity: Unknown Type of Exam: Initial Additional signs and symptoms: dizziness Relevant Medical/Surgical History: CP FINDINGS: The cardiomediastinal silhouette is within normal limits. There is no consolidation, pneumothorax or evidence for edema. No evidence for effusion. No acute osseous abnormality is identified. No acute airspace disease identified. NM MYOCARDIAL SPECT REST EXERCISE OR RX    Result Date: 6/8/2021  Cardiac Perfusion Imaging   Demographics   Patient Name      Al George     Date of study        06/08/2021   Date of Birth     1975         Gender               Male   Age               55 year(s)         Race                    Patient Number    [id number]         Room Number          4410   Visit Number      [id number]          Height               69 inches   Corporate ID      [id number]           Weight               270 pounds   Accession Number  [id number]                                        NM Technologist      Rina Nissen, Hernandez De La Garza  Physician         APRN-CNP           Cardiologist         Sukhi PAK   Conclusions   Summary  Small sized defect of mild severity which is mixed involving inferalateral  wall of myocardium. Abnormal Lexiscan nuclear scintigraphic study suggestive  of abnormal myocardial perfusion. Gated images demonstrate abnormal left  ventricular systolic function with EF of 41 %.  Nuclear scintigraphy  demonstartes inferior wall infarct with mild ischemia of lateral wall   Signatures   ------------------------------------------------------------------  Electronically signed by Phuc Agosto MD  (Interpreting cardiologist) on 06/08/2021 at 17:40  ------------------------------------------------------------------  Procedure Procedure Type:   Nuclear Stress Test:Pharmacological, Myocardial Perfusion Imaging with  Pharm, NM MYOCARDIAL SPECT REST EXERCISE OR RX  Indications: Chest pain. Risk Factors   The patient risk factors include:prior PCI;obesity, Current - Frequency  unknown tobacco use, hypercholesterolemia, hypertension, family history of  premature CAD and diabetes mellitus. Stress Protocols   Resting ECG  Normal sinus rhythm. Resting HR:77 bpm  Resting BP:139/85 mmHg  Stress Protocol:Pharmacologic - Lexiscan  Peak HR:86 bpm                   HR/BP product:[id number]  Peak BP:163/77 mmHg  Predicted HR: 174 bpm  % of predicted HR: 49   Exercise duration: 01:00 min  Reason for termination:Completed   Symptoms  Nausea. Procedure Medications   - Lexiscan I.V. bolus (over 15sec.) 0.4 mg admininstered @ 06/08/2021 11:15.   - Aminophylline I.V. bolus (over 15sec.) 75 mg admininstered @ 06/08/2021     11:18. Imaging Protocols   Rest                             Stress   Isotope:Sestamibi 99mTc          Isotope: Sestamibi 99mTc  Isotope dose:10.6 mCi            Isotope dose:29.4 mCi  Administration route: I.V. Administration route: I.V. Injection Date:06/08/2021 08:00  Injection Date:06/08/2021 11:15  Scan Date:06/08/2021 08:45       Scan Date:06/08/2021 12:00   Technique:        SPECT          Technique:        Gated                                                     SPECT   Procedure Description   Upon patient arrival, the patient is identified using two identifiers and  the physician order is verified. An IV is established and 8-11mCi of 99mTc  Sestamibi is intravenously injected and followed with 10mL 0.9% Normal  Saline flush. A circulation period of 45 minutes occurs prior to resting  SPECT imaging. After imaging is complete the patient is escorted to the  stress lab. The patient is connected to the ECG and blood pressure is  measured. The RN starts the stress portion of the exam and rapidly  intravenously injects Lexiscan (regadenosine) 0.4mg over a period of 10 to15  seconds and follows with 5mL 0.9% Normal Saline flush. Immediately following  the Nuclear Technologist intravenously injects 22-33mCi of 99mTc Sestamibi  and 5mL 0.9% Normal Saline flush. After completion, recovery, and removal of  the IV, the patient rests during the second circulation period of 45  minutes. Final stress SPECT gated imaging is performed. The patient may  return home or to their room after stress imaging. The images are processed  and final charting is completed and sent to the appropriate cardiologist for  interpretation and reporting. Perfusion Interpretation   Small sized defect of mild severity which is mixed involving inferalateral  wall of myocardium. Imaging Results    Summed scores     - Summed stress score: 16     - Summed rest score: 4     - Summed difference score:    12   Rest ejection  Ejection fraction:48 %  EDV :132 ml  ESV :68 ml  Stroke volume :64 ml  Medical History   Accession#:  [id number]  Admission Data Admission date: 06/05/2021 Admission Time: 16:20 Hospital Status: Inpatient. EKG     Rhythm: normal sinus       Immunization History   Administered Date(s) Administered    Influenza 10/15/2013    Influenza, Quadv, 6 mo and older, IM, PF (Flulaval, Fluarix) 09/19/2018    Pneumococcal Polysaccharide ([id number]) 05/02/2013         The patient was seen and examined on day of discharge and this discharge summary is in conjunction with any daily progress note from day of discharge. Time Spent on discharge is   >35  min  in the examination, evaluation, counseling and review of medications and discharge plan.             SignedChayito Campos MD   6/15/2021

## 2021-06-15 NOTE — PROGRESS NOTES
General Surgery- [facility] Day: 11    Chief Complaint on Admission: syncope      Subjective:     Denies complaints. Ready to go home. No F/C. Had dressing changed yesterday by Wound Nurse. ROS:  Review of Systems   Constitutional: Negative for chills and fever. HENT: Negative. Eyes: Negative. Respiratory: Negative. Gastrointestinal: Negative. Endocrine: Negative. Genitourinary: Negative for difficulty urinating, discharge and scrotal swelling. Musculoskeletal: Negative. Skin: Positive for wound. Allergic/Immunologic: Negative. Neurological: Negative for syncope. Hematological: Negative. Psychiatric/Behavioral: Negative. Allergies  Adhesive tape, Doxycycline, and Reglan [metoclopramide]          Diagnosis Date    Abscess 2010    scrotal    Acute renal failure (ARF) ([address] 8/4/2019    Anxiety associated with depression     Anxiety associated with depression     Back pain 7/2/2012    Chest pain 5/1/2013    Diabetic nephropathy ([address]     Diabetic neuropathy ([address] 12/22/2011    Diabetic ulcer of left midfoot associated with type 2 diabetes mellitus, with fat layer exposed ([address] 7/18/2017    Diverticulosis     C scope + Dr. Cassie Snellen DM (diabetes mellitus), type 2 ([address] 2002    DR. 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 Cassie Snellen Necrotizing fasciitis [facility])     Nose fracture 1988    Panic attacks     Pericarditis 2003    Hospitalized with s/p heart cath normal    Peripheral autonomic neuropathy due to diabetes mellitus ([address]     Axonal EMG- [facility], March 2011    Sebaceous cyst 9/1/2011    URI (upper respiratory infection) 2/27/2012    WD-Wound, surgical, infected, initial encounter 12/8/2017    Wrist fracture 1986       Objective:     Vitals:    06/15/21 0815   BP: 113/76   Pulse: 72   Resp: 17   Temp: 97.4 °F (36.3 °C)   SpO2: 95% TEMPERATURE:  Current -Temp: 97.4 °F (36.3 °C); Max - Temp  Av.5 °F (36.4 °C)  Min: 97.4 °F (36.3 °C)  Max: 97.7 °F (36.5 °C)    I/O this shift:  In: 10 [I.V.:10]  Out: - I/O last 3 completed shifts: In: 806.7 [P.O.:600; IV Piggyback:206.7]  Out: 6803 [Urine:2475]      Physical Exam:  Physical Exam  Constitutional:       General: He is not in acute distress. Appearance: He is not ill-appearing, toxic-appearing or diaphoretic. HENT:      Head: Normocephalic and atraumatic. Right Ear: External ear normal.      Left Ear: External ear normal.      Nose: Nose normal.   Eyes:      General:         Right eye: No discharge. Left eye: No discharge. Extraocular Movements: Extraocular movements intact. Cardiovascular:      Rate and Rhythm: Normal rate. Pulmonary:      Effort: Pulmonary effort is normal.   Abdominal:      Palpations: Abdomen is soft. Tenderness: There is no abdominal tenderness. Genitourinary:     Comments: Groin wound with packing in place, beefy red base without drainage or odor. No yellow sloughing present  Musculoskeletal:         General: No swelling. Cervical back: Normal range of motion. Skin:     General: Skin is warm. Neurological:      General: No focal deficit present. Mental Status: He is alert.            Scheduled Meds:   insulin glargine  25 Units Subcutaneous Nightly    meropenem  1,000 mg Intravenous Q8H    fluconazole  200 mg Oral Weekly    ondansetron  4 mg Intravenous Once    amLODIPine  10 mg Oral Daily    metoprolol tartrate  25 mg Oral BID    atorvastatin  40 mg Oral Nightly    collagenase   Topical Daily    miconazole   Topical BID    aspirin  81 mg Oral Daily    tamsulosin  0.4 mg Oral Daily    sertraline  100 mg Oral Daily    sodium chloride flush  5-40 mL Intravenous 2 times per day    heparin (porcine)  5,000 Units Subcutaneous 3 times per day    insulin lispro  0.05 Units/kg Subcutaneous TID     insulin lispro  0-6 Units Subcutaneous TID     insulin lispro  0-3 Units Subcutaneous Nightly     ContinuousInfusions:   dextrose      sodium chloride       PRN Meds:ondansetron, oxyCODONE-acetaminophen, glucose, dextrose, glucagon (rDNA), dextrose, sodium chloride flush, sodium chloride, potassium chloride **OR** potassium alternative oral replacement **OR** potassium chloride, magnesium sulfate, promethazine **OR** ondansetron, polyethylene glycol, fleet, acetaminophen **OR** acetaminophen, zolpidem, guaiFENesin-dextromethorphan, benzonatate, calcium carbonate, morphine      Labs/Imaging Results:   Lab Results   Component Value Date    WBC 7.1 06/14/2021    HGB 9.1 (L) 06/14/2021    HCT 27.2 (L) 06/14/2021    MCV 88.3 06/14/2021     06/14/2021     Lab Results   Component Value Date     06/14/2021    K 4.7 06/14/2021     06/14/2021    CO2 22 06/14/2021    BUN 34 (H) 06/14/2021    CREATININE 1.7 (H) 06/14/2021    GLUCOSE 168 (H) 06/14/2021    CALCIUM 8.3 06/14/2021    PROT 5.8 (L) 06/12/2021    LABALBU 2.4 (L) 06/14/2021    BILITOT 0.1 06/12/2021    ALKPHOS 62 06/12/2021    AST 11 (L) 06/12/2021    ALT 9 (L) 06/12/2021    LABGLOM 44 (L) 06/14/2021    GFRAA 53 (L) 06/14/2021     CT A/P:  Redemonstration of debridement involving the rightward aspect of the scrotum   and the perineum.       The amount of fat stranding adjacent to the wound appears increased.    However, the gas and fluid collection seen previously within the right   scrotum is no longer detected.  The bubbles of gas seen previously tracking   from the wound also no longer detected.       No gas/fluid collections are identified on the current examination to suggest   abscess formation.       Scrotal skin thickening, especially the right, increased when compared to the   previous exam.       Small left hydrocele, decreased when compared to the previous exam.         Assessment:       56 y/o M with syncope, history of tolu gangrene s/p

## 2021-06-15 NOTE — CARE COORDINATION
LSW noted that Pt has a discharge. Per Dr. Arielle Horan note Pt is being switched to a new IV ATB. ertapenem 1g daily for  10 days (end-date: 6/23/2021). LSW notified [facility] of the Rx change. Faxed the new Rx to [facility]. F/u in Pt room to notify the Pt of the new Rx. Pt is aware. Call to [facility] to notify them of the discharge. LSW faxed the new orders to [facility].

## 2021-06-15 NOTE — PROGRESS NOTES
Nephrology Progress Note  6/15/2021 6:47 AM        Subjective:   Admit Date: 6/5/2021  PCP: Stefani Arreguin MD    Interval History: no event     Diet: reasonable - wants to go home    ROS:  No sob      Data:     Current med's:    insulin glargine  25 Units Subcutaneous Nightly    meropenem  1,000 mg Intravenous Q8H    fluconazole  200 mg Oral Weekly    ondansetron  4 mg Intravenous Once    amLODIPine  10 mg Oral Daily    metoprolol tartrate  25 mg Oral BID    atorvastatin  40 mg Oral Nightly    collagenase   Topical Daily    miconazole   Topical BID    aspirin  81 mg Oral Daily    tamsulosin  0.4 mg Oral Daily    sertraline  100 mg Oral Daily    sodium chloride flush  5-40 mL Intravenous 2 times per day    heparin (porcine)  5,000 Units Subcutaneous 3 times per day    insulin lispro  0.05 Units/kg Subcutaneous TID WC    insulin lispro  0-6 Units Subcutaneous TID WC    insulin lispro  0-3 Units Subcutaneous Nightly      dextrose      sodium chloride           I/O last 3 completed shifts: In: 600 [P.O.:600]  Out: 2475 [Urine:2475]    CBC:   Recent Labs     06/12/21  0754 06/13/21  0808 06/14/21  0932   WBC 7.2 6.5 7.1   HGB 8.1* 8.8* 9.1*    298 292          Recent Labs     06/12/21  0754 06/13/21  0808 06/14/21  0829    138 141   K 5.1 4.7 4.7    108 110   CO2 19* 23 22   BUN 37* 35* 34*   CREATININE 2.0* 1.8* 1.7*   GLUCOSE 187* 199* 168*       Lab Results   Component Value Date    CALCIUM 8.3 06/14/2021    PHOS 4.1 06/14/2021       Objective:     Vitals: /82   Pulse 70   Temp 97.7 °F (36.5 °C) (Oral)   Resp 18   Ht 5' 9\" (1.753 m)   Wt 267 lb 8 oz (121.3 kg)   SpO2 95%   BMI 39.50 kg/m²     General appearance:  No ac distress  HEENT:  Mild conj pallor  Neck:  supple  Lungs:  No gross crackles  Heart:  Seems RRR  Abdomen: soft  Extremities:  + edema now at [facility]       Problem List :         Impression :     1. CARMEN- CKD stage 3 a A3 recovering   2.  Skin- soft tissue infection with several antimicrobial   3. DM/NS/HTN     Recommendation/Plan  :     1. His manual BP rt brachial by me 142/85- add chlorthalidone 25/d   2. Low salt   3. DASH diet   4. daily wt  5. Call me with wt gain > 2 lbs - worsening of edema or sob  6. Good BS control  7. CMP in 102 wk's   8. F/U With me in 3-4  wks   9.  I will try adding RAAS inh as an  out pt       Renata Alfonso MD MD

## 2021-06-15 NOTE — DISCHARGE INSTR - DIET

## 2021-06-21 ENCOUNTER — HOSPITAL ENCOUNTER (OUTPATIENT)
Age: 46
Setting detail: SPECIMEN
Discharge: HOME | End: 2021-06-21
Payer: MEDICARE

## 2021-06-21 LAB
ALBUMIN SERPL-MCNC: 3 GM/DL (ref 3.4–5)
ALP BLD-CCNC: 87 IU/L (ref 40–128)
ALT SERPL-CCNC: 21 U/L (ref 10–40)
ANION GAP SERPL CALCULATED.3IONS-SCNC: 13 MMOL/L (ref 4–16)
AST SERPL-CCNC: 19 IU/L (ref 15–37)
BASOPHILS ABSOLUTE: 0.1 K/CU MM
BASOPHILS RELATIVE PERCENT: 0.8 % (ref 0–1)
BILIRUB SERPL-MCNC: 0.2 MG/DL (ref 0–1)
BUN BLDV-MCNC: 66 MG/DL (ref 6–23)
CALCIUM SERPL-MCNC: 8.5 MG/DL (ref 8.3–10.6)
CHLORIDE BLD-SCNC: 107 MMOL/L (ref 99–110)
CO2: 18 MMOL/L (ref 21–32)
CREAT SERPL-MCNC: 1.9 MG/DL (ref 0.9–1.3)
DIFFERENTIAL TYPE: ABNORMAL
EOSINOPHILS ABSOLUTE: 0.4 K/CU MM
EOSINOPHILS RELATIVE PERCENT: 4.7 % (ref 0–3)
ERYTHROCYTE SEDIMENTATION RATE: 140 MM/HR (ref 0–15)
GFR AFRICAN AMERICAN: 46 ML/MIN/1.73M2
GFR NON-AFRICAN AMERICAN: 38 ML/MIN/1.73M2
GLUCOSE BLD-MCNC: 43 MG/DL (ref 70–99)
HCT VFR BLD CALC: 28.9 % (ref 42–52)
HEMOGLOBIN: 9.1 GM/DL (ref 13.5–18)
IMMATURE NEUTROPHIL %: 0.7 % (ref 0–0.43)
LYMPHOCYTES ABSOLUTE: 0.9 K/CU MM
LYMPHOCYTES RELATIVE PERCENT: 12 % (ref 24–44)
MCH RBC QN AUTO: 29.5 PG (ref 27–31)
MCHC RBC AUTO-ENTMCNC: 31.5 % (ref 32–36)
MCV RBC AUTO: 93.8 FL (ref 78–100)
MONOCYTES ABSOLUTE: 0.9 K/CU MM
MONOCYTES RELATIVE PERCENT: 11.8 % (ref 0–4)
NUCLEATED RBC %: 0 %
PDW BLD-RTO: 14 % (ref 11.7–14.9)
PLATELET # BLD: 236 K/CU MM (ref 140–440)
PMV BLD AUTO: 10.5 FL (ref 7.5–11.1)
POTASSIUM SERPL-SCNC: 4.7 MMOL/L (ref 3.5–5.1)
RBC # BLD: 3.08 M/CU MM (ref 4.6–6.2)
SEGMENTED NEUTROPHILS ABSOLUTE COUNT: 5.4 K/CU MM
SEGMENTED NEUTROPHILS RELATIVE PERCENT: 70 % (ref 36–66)
SODIUM BLD-SCNC: 138 MMOL/L (ref 135–145)
TOTAL IMMATURE NEUTOROPHIL: 0.05 K/CU MM
TOTAL NUCLEATED RBC: 0 K/CU MM
TOTAL PROTEIN: 6.6 GM/DL (ref 6.4–8.2)
WBC # BLD: 7.6 K/CU MM (ref 4–10.5)

## 2021-06-21 PROCEDURE — 85025 COMPLETE CBC W/AUTO DIFF WBC: CPT

## 2021-06-21 PROCEDURE — 80053 COMPREHEN METABOLIC PANEL: CPT

## 2021-06-21 PROCEDURE — 36415 COLL VENOUS BLD VENIPUNCTURE: CPT

## 2021-06-21 PROCEDURE — 85652 RBC SED RATE AUTOMATED: CPT

## 2021-06-24 ENCOUNTER — TELEPHONE (OUTPATIENT)
Dept: INFECTIOUS DISEASES | Age: 46
End: 2021-06-24

## 2021-07-01 NOTE — DISCHARGE SUMMARY
up to the facility. This was reviewed, and I chose  to start some medication for the patient as to help his overall  depressive state since I felt he might have a prolonged hospitalization. There was an incident where the patient was very upset on 05/18 because  of someone coming in to remove all his belongings, even though he had  been on suicide precaution for over a day without anything being taken  from his room. He was told he would receive Haldol by the nursing staff  which had been ordered by the psychiatric nurse consultant in  consultation. I did not find out about the incident until the next day. The patient needed to continue his IV antibiotics. I did not think he  needed to be transferred for acute psychiatric care at this time and  work to do de-escalate the situation. The patient was stable. Dr. Leonides Olson of Infectious Disease has a consult in chart from 05/19 and  reviewed that the patient was on vancomycin, Zosyn, clindamycin, and the  surgical culture was polymicrobial.  Diphtheroids, Staphylococcus  epidermidis, and Clostridium inoculum. Dr. Brent Bartlett followed the patient  as well and on 05/19, the patient had a creatinine of 3 range. It was  felt he had CKD stage 3a A3, recovering from ATI, both toxic and  ischemic with nonoliguric renal failure. Surgery felt the patient was  slowly improving. We arranged for a wound VAC and Urology also saw the  patient. Arrangements were made for deep line, so he can continue to  receive IV antibiotics as an outpatient. Dr. Yee Paz, infectious  disease note from 05/20 reviews that he would need to continue  vancomycin and metronidazole and there was a plan to discharge him with  dalbavancin one dose and metronidazole 500 mg three times daily only for  2 weeks. We had to arrange with home care to get that medication, it  was challenging.     The patient was arranged for discharge to have vancomycin at a dose I  believe 1500 mg q.36 hours and the dalbavancin. On , the social  worker noted the delivery of the portable wound VAC and arrangements  were made for him to go home. His wife came to pick him up. He had  arrangements for the outpatient antibiotics as well and was discharged  in an improved condition to follow up in the very near future with the  infectious disease consultant and the surgeon and his private medical  doctor, Dr. Rosie Miller. DISCHARGE MEDICATIONS:  Include the followin. Flomax 0.4 daily. 2.  Percocet one every 6 hours as needed for pain up to 7 days. 3.  Lantus insulin 60 units nightly. 4.  Chlorthalidone 25 mg daily. 5.  Amlodipine and benazepril which is Lotrel 5/20 daily. 6.  Metronidazole 500 mg t.i.d. for 15 days. 7.  Aspirin 81 mg once a day. 8.  Tradjenta 5 mg daily. 9.  Insulin at meals, Humalog 25 units three times a day with meals plus  sliding scale. 10.  Zofran 4 mg one every 8 hours a day for nausea, vomiting. 11.  Diabetic lancets and glucose monitoring kit. Again the IV antibiotics were to be done as an outpatient for a one-time  dose of the dalbavancin and Infectious Disease was going to work with as  well. Admitted on  and discharged on  by Dr. Shar Davies.         Linus Valentin MD    D: 2021 8:15:47       T: 2021 8:24:04     [email or web address]  Job#: [id number]     Doc#: [id number]    CC:  Stefani Arreguin MD

## 2021-07-29 ENCOUNTER — OFFICE VISIT (OUTPATIENT)
Dept: BARIATRICS/WEIGHT MGMT | Age: 46
End: 2021-07-29
Payer: MEDICARE

## 2021-07-29 VITALS
WEIGHT: 267 LBS | DIASTOLIC BLOOD PRESSURE: 86 MMHG | BODY MASS INDEX: 39.43 KG/M2 | HEART RATE: 80 BPM | OXYGEN SATURATION: 90 % | SYSTOLIC BLOOD PRESSURE: 120 MMHG

## 2021-07-29 DIAGNOSIS — Z87.438 HISTORY OF FOURNIER'S GANGRENE: Primary | ICD-10-CM

## 2021-07-29 PROCEDURE — 1036F TOBACCO NON-USER: CPT | Performed by: SURGERY

## 2021-07-29 PROCEDURE — G8417 CALC BMI ABV UP PARAM F/U: HCPCS | Performed by: SURGERY

## 2021-07-29 PROCEDURE — G8427 DOCREV CUR MEDS BY ELIG CLIN: HCPCS | Performed by: SURGERY

## 2021-07-29 PROCEDURE — 99213 OFFICE O/P EST LOW 20 MIN: CPT | Performed by: SURGERY

## 2021-07-29 NOTE — PROGRESS NOTES
Chief Complaint   Patient presents with    Follow Up After Procedure     f/u from i&d of scrotum 5/15/21 home care d/c vac         SUBJECTIVE:  HPI: Patient is here with complaints of: wound check. Overall doing well. No issues. Wound healing appropriately. Has been getting silver dressing. Denies F/C. Tolerating PO. Denies N/V. States he blood sugars are much better controlled now. I have reviewed the patient's(pertinent information to this visit) medical history, family history(scanned in  the [address] [place] [address] under \"patient questioner\"), social history and review of systems with the patient today in the office.             Past Surgical History:   Procedure Laterality Date    ABDOMEN SURGERY      CARDIAC CATHETERIZATION  2003, 2013    Normal (Dr. Reyes Sciara)    COLONOSCOPY  6/2/2011    Pandiverticulosis, Nonbleeding internal hemorrhoids, Repeat colonoscopy at age 48- Dr. Sandoval Conception    \"had reconstruction surgery on nose a year after the other nose surgery\"    OTHER SURGICAL HISTORY Right 05/22/2015    I & D right great toe with partial amputation    OTHER SURGICAL HISTORY  12/04/2017    inc and drainage of abcess    IL DEEP INCIS FOOT BONE INFECTN Left 9/22/2018    LEFT FOOT DEBRIDEMENT INCISION AND DRAINAGE TOP AND BOTTOM performed by Mina Anders DPM at [facility] N/A 5/15/2021    SCROTAL AND PERINEAL DEBRIDEMENT performed by Rusty Jameson MD at [address] [place] [address] 5/16/2021    SCROTAL RE-DEBRIDEMENT  INCISION AND DRAINAGE performed by Rusty Jameson MD at [address] [place] [address]    sebaceous cyst removal times 4     TOE AMPUTATION      right great toe    TOE AMPUTATION Right 3/23/2019    TOE AMPUTATION RIGHT 5TH TOE performed by Mina Anders DPM at [facility] TOE AMPUTATION Right 8/6/2019    TOE AMPUTATION RIGHT 4TH TOE performed by Mina Anders DPM at [address] [place] [address] GASTROINTESTINAL ENDOSCOPY  06/2/2011    Mild gastritis with moderatley severe antritis, small hiatal hernia, Dr. Rosey Rosa N/A 1/12/2019    EGD BIOPSY performed by Adrianna Winkler MD at [facility] ENDOSCOPY     Past Medical History:   Diagnosis Date    Abscess 2010    scrotal    Acute renal failure (ARF) ([address] 8/4/2019    Anxiety associated with depression     Anxiety associated with depression     Back pain 7/2/2012    Chest pain 5/1/2013    Diabetic nephropathy ([address]     Diabetic neuropathy ([address] 12/22/2011    Diabetic ulcer of left midfoot associated with type 2 diabetes mellitus, with fat layer exposed ([address] 7/18/2017    Diverticulosis     C scope + Dr. Sayra Tee DM (diabetes mellitus), type 2 ([address] 2002    DR. 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 Sayra Tee Necrotizing fasciitis [facility])     Nose fracture 1988    Panic attacks     Pericarditis 2003    Hospitalized with s/p heart cath normal    Peripheral autonomic neuropathy due to diabetes mellitus ([address]     Axonal EMG- NCS, March 2011    Sebaceous cyst 9/1/2011    URI (upper respiratory infection) 2/27/2012    WD-Wound, surgical, infected, initial encounter 12/8/2017    Wrist fracture 1986     Family History   Problem Relation Age of Onset    Cancer Mother         ?site   [facility] Stroke Mother     Bleeding Prob Mother     Diabetes Father     Heart Disease Father     High Blood Pressure Father     Obesity Father     Kidney Disease Father     Diabetes Sister     High Blood Pressure Sister     Mental Illness Sister     Obesity Sister     High Blood Pressure Other     Mental Illness Other         bipolar    Other Son         cyst in ear canal    ADHD Daughter      Social History     Socioeconomic History    Marital status:      Spouse name: Not on file    Number of children: Not on file    Years of education: Not on file    Highest education level: Not on file   Occupational History    Not on file   Tobacco Use    Smoking status: Former Smoker     Years: 20.00     Quit date: 11/18/2017     Years since quitting: 3.6    Smokeless tobacco: Never Used   Vaping Use    Vaping Use: Never used   Substance and Sexual Activity    Alcohol use: Yes     Comment: occ    Drug use: Yes     Frequency: 7.0 times per week     Types: Marijuana    Sexual activity: Yes     Partners: Female   Other Topics Concern    Not on file   Social History Narrative    Not on file     Social Determinants of Health     Financial Resource Strain:     Difficulty of Paying Living Expenses:    Food Insecurity:     Worried About Running Out of Food in the Last Year:     [address] in the Last Year:    Transportation Needs:     Lack of Transportation (Medical):      Lack of Transportation (Non-Medical):    Physical Activity:     Days of Exercise per Week:     Minutes of Exercise per Session:    Stress:     Feeling of Stress :    Social Connections:     Frequency of Communication with Friends and Family:     Frequency of Social Gatherings with Friends and Family:     Attends Yarsani Services:     Active Member of Clubs or Organizations:     Attends Club or Organization Meetings:     Marital Status:    Intimate Partner Violence:     Fear of Current or Ex-Partner:     Emotionally Abused:     Physically Abused:     Sexually Abused:        Current Outpatient Medications   Medication Sig Dispense Refill    atorvastatin (LIPITOR) 40 MG tablet Take 1 tablet by mouth nightly 30 tablet 3    metoprolol tartrate (LOPRESSOR) 25 MG tablet Take 1 tablet by mouth 2 times daily 60 tablet 3    amLODIPine (NORVASC) 10 MG tablet Take 1 tablet by mouth daily 30 tablet 3    chlorthalidone (HYGROTON) 25 MG tablet Take 1 tablet by mouth daily 30 tablet 3    rivaroxaban (XARELTO) 10 MG TABS tablet Take 1 tablet by mouth daily (with breakfast) 30 tablet 1    sertraline (ZOLOFT) 50 MG tablet Take 2 tablets by mouth daily 30 tablet 3    tamsulosin (FLOMAX) 0.4 MG capsule Take 1 capsule by mouth daily 30 capsule 3    aspirin 81 MG EC tablet Take 1 tablet by mouth daily 30 tablet 3    linagliptin (TRADJENTA) 5 MG tablet Take 1 tablet by mouth daily 30 tablet 5    ondansetron (ZOFRAN) 4 MG tablet Take 1 tablet by mouth every 8 hours as needed for Nausea or Vomiting 20 tablet 0    Lancets MISC 1 each by Does not apply route daily 100 each 3    glucose monitoring kit (FREESTYLE) monitoring kit 1 each by Does not apply route once for 1 dose. 1 kit 0     No current facility-administered medications for this visit. Allergies   Allergen Reactions    Adhesive Tape Rash    Doxycycline Nausea And Vomiting    Reglan [Metoclopramide] Anxiety       Review of Systems:         Review of Systems   Constitutional: Negative for chills and fever. HENT: Negative. Eyes: Negative. Respiratory: Negative. Cardiovascular: Negative. Gastrointestinal: Negative. Endocrine: Negative. Genitourinary: Negative. Musculoskeletal: Negative. Skin: Positive for wound. Allergic/Immunologic: Negative. Neurological: Negative. Hematological: Negative. Psychiatric/Behavioral: Negative. All other systems reviewed and are negative. OBJECTIVE:  Physical Exam:    /86   Pulse 80   Wt 267 lb (121.1 kg)   SpO2 90%   BMI 39.43 kg/m²      Physical Exam  Vitals reviewed. Constitutional:       General: He is not in acute distress. Appearance: Normal appearance. He is not ill-appearing, toxic-appearing or diaphoretic. HENT:      Head: Normocephalic and atraumatic. Nose: Nose normal.   Eyes:      General:         Right eye: No discharge. Left eye: No discharge. Extraocular Movements: Extraocular movements intact. Cardiovascular:      Rate and Rhythm: Normal rate.    Pulmonary:      Effort: Pulmonary effort is normal.   Abdominal:      Palpations: Abdomen is soft. Genitourinary:     Comments: Right groin incision healing appropriately. No drainage, no foul odor. Good granulation tissue. Musculoskeletal:         General: Normal range of motion. Skin:     General: Skin is warm. Neurological:      General: No focal deficit present. Mental Status: He is alert. Psychiatric:         Mood and Affect: Mood normal.           ASSESSMENT:  1. History of Irene's gangrene          PLAN:  Treatment:    -Wound healing appropriately. No further s/s of infection.    -Needs excellent BS control at home. Per PCP / Endocrine. -F/u in 4-8 weeks.    -Call with any questions, concerns, or issues whatsoever. .  Patient counseled on risks, benefits, and alternatives of treatment plan at length today. Patient states an understanding and willingness to proceed with plan. No orders of the defined types were placed in this encounter. No orders of the defined types were placed in this encounter. Follow Up:  No follow-ups on file.       Maude Plata MD

## 2021-08-10 ENCOUNTER — APPOINTMENT (OUTPATIENT)
Dept: GENERAL RADIOLOGY | Age: 46
End: 2021-08-10
Payer: MEDICARE

## 2021-08-10 VITALS
HEART RATE: 95 BPM | TEMPERATURE: 97.7 F | SYSTOLIC BLOOD PRESSURE: 155 MMHG | RESPIRATION RATE: 18 BRPM | BODY MASS INDEX: 39.55 KG/M2 | DIASTOLIC BLOOD PRESSURE: 87 MMHG | HEIGHT: 69 IN | WEIGHT: 267 LBS | OXYGEN SATURATION: 99 %

## 2021-08-10 LAB
ALBUMIN SERPL-MCNC: 3.4 GM/DL (ref 3.4–5)
ALP BLD-CCNC: 89 IU/L (ref 40–129)
ALT SERPL-CCNC: 12 U/L (ref 10–40)
ANION GAP SERPL CALCULATED.3IONS-SCNC: 12 MMOL/L (ref 4–16)
AST SERPL-CCNC: 12 IU/L (ref 15–37)
BASOPHILS ABSOLUTE: 0 K/CU MM
BASOPHILS RELATIVE PERCENT: 0.5 % (ref 0–1)
BILIRUB SERPL-MCNC: 0.1 MG/DL (ref 0–1)
BUN BLDV-MCNC: 54 MG/DL (ref 6–23)
CALCIUM SERPL-MCNC: 8.6 MG/DL (ref 8.3–10.6)
CHLORIDE BLD-SCNC: 107 MMOL/L (ref 99–110)
CO2: 20 MMOL/L (ref 21–32)
CREAT SERPL-MCNC: 2.3 MG/DL (ref 0.9–1.3)
DIFFERENTIAL TYPE: ABNORMAL
EOSINOPHILS ABSOLUTE: 0.2 K/CU MM
EOSINOPHILS RELATIVE PERCENT: 2.3 % (ref 0–3)
GFR AFRICAN AMERICAN: 37 ML/MIN/1.73M2
GFR NON-AFRICAN AMERICAN: 31 ML/MIN/1.73M2
GLUCOSE BLD-MCNC: 124 MG/DL (ref 70–99)
HCT VFR BLD CALC: 33.8 % (ref 42–52)
HEMOGLOBIN: 11 GM/DL (ref 13.5–18)
IMMATURE NEUTROPHIL %: 0.5 % (ref 0–0.43)
LACTATE: 1 MMOL/L (ref 0.4–2)
LYMPHOCYTES ABSOLUTE: 1.2 K/CU MM
LYMPHOCYTES RELATIVE PERCENT: 16.1 % (ref 24–44)
MCH RBC QN AUTO: 30.1 PG (ref 27–31)
MCHC RBC AUTO-ENTMCNC: 32.5 % (ref 32–36)
MCV RBC AUTO: 92.3 FL (ref 78–100)
MONOCYTES ABSOLUTE: 0.7 K/CU MM
MONOCYTES RELATIVE PERCENT: 9.2 % (ref 0–4)
NUCLEATED RBC %: 0 %
PDW BLD-RTO: 13 % (ref 11.7–14.9)
PLATELET # BLD: 249 K/CU MM (ref 140–440)
PMV BLD AUTO: 9.6 FL (ref 7.5–11.1)
POTASSIUM SERPL-SCNC: 4.5 MMOL/L (ref 3.5–5.1)
RBC # BLD: 3.66 M/CU MM (ref 4.6–6.2)
SEGMENTED NEUTROPHILS ABSOLUTE COUNT: 5.3 K/CU MM
SEGMENTED NEUTROPHILS RELATIVE PERCENT: 71.4 % (ref 36–66)
SODIUM BLD-SCNC: 139 MMOL/L (ref 135–145)
TOTAL IMMATURE NEUTOROPHIL: 0.04 K/CU MM
TOTAL NUCLEATED RBC: 0 K/CU MM
TOTAL PROTEIN: 7.7 GM/DL (ref 6.4–8.2)
WBC # BLD: 7.4 K/CU MM (ref 4–10.5)

## 2021-08-10 PROCEDURE — 96365 THER/PROPH/DIAG IV INF INIT: CPT

## 2021-08-10 PROCEDURE — 96361 HYDRATE IV INFUSION ADD-ON: CPT

## 2021-08-10 PROCEDURE — 83880 ASSAY OF NATRIURETIC PEPTIDE: CPT

## 2021-08-10 PROCEDURE — 85025 COMPLETE CBC W/AUTO DIFF WBC: CPT

## 2021-08-10 PROCEDURE — 71045 X-RAY EXAM CHEST 1 VIEW: CPT

## 2021-08-10 PROCEDURE — 83605 ASSAY OF LACTIC ACID: CPT

## 2021-08-10 PROCEDURE — 99283 EMERGENCY DEPT VISIT LOW MDM: CPT

## 2021-08-10 PROCEDURE — 93005 ELECTROCARDIOGRAM TRACING: CPT | Performed by: STUDENT IN AN ORGANIZED HEALTH CARE EDUCATION/TRAINING PROGRAM

## 2021-08-10 PROCEDURE — 36415 COLL VENOUS BLD VENIPUNCTURE: CPT

## 2021-08-10 PROCEDURE — 80053 COMPREHEN METABOLIC PANEL: CPT

## 2021-08-10 ASSESSMENT — PAIN SCALES - GENERAL: PAINLEVEL_OUTOF10: 6

## 2021-08-11 ENCOUNTER — APPOINTMENT (OUTPATIENT)
Dept: CT IMAGING | Age: 46
End: 2021-08-11
Payer: MEDICARE

## 2021-08-11 ENCOUNTER — HOSPITAL ENCOUNTER (EMERGENCY)
Age: 46
Discharge: HOME | End: 2021-08-11
Attending: EMERGENCY MEDICINE
Payer: MEDICARE

## 2021-08-11 DIAGNOSIS — T14.8XXA WOUND INFECTION: ICD-10-CM

## 2021-08-11 DIAGNOSIS — R05.9 COUGH: Primary | ICD-10-CM

## 2021-08-11 DIAGNOSIS — L08.9 WOUND INFECTION: ICD-10-CM

## 2021-08-11 LAB — PRO-BNP: 635.8 PG/ML

## 2021-08-11 PROCEDURE — 74176 CT ABD & PELVIS W/O CONTRAST: CPT

## 2021-08-11 PROCEDURE — 2500000003 HC RX 250 WO HCPCS: Performed by: EMERGENCY MEDICINE

## 2021-08-11 PROCEDURE — 96365 THER/PROPH/DIAG IV INF INIT: CPT

## 2021-08-11 PROCEDURE — 6370000000 HC RX 637 (ALT 250 FOR IP): Performed by: EMERGENCY MEDICINE

## 2021-08-11 PROCEDURE — 2580000003 HC RX 258: Performed by: EMERGENCY MEDICINE

## 2021-08-11 RX ORDER — CLINDAMYCIN PHOSPHATE 600 MG/50ML
600 INJECTION INTRAVENOUS ONCE
Status: COMPLETED | OUTPATIENT
Start: 2021-08-11 | End: 2021-08-11

## 2021-08-11 RX ORDER — HYDROCODONE BITARTRATE AND ACETAMINOPHEN 5; 325 MG/1; MG/1
1 TABLET ORAL ONCE
Status: COMPLETED | OUTPATIENT
Start: 2021-08-11 | End: 2021-08-11

## 2021-08-11 RX ORDER — GUAIFENESIN AND CODEINE PHOSPHATE 100; 10 MG/5ML; MG/5ML
5 SOLUTION ORAL 3 TIMES DAILY PRN
Qty: 120 ML | Refills: 0 | Status: SHIPPED | OUTPATIENT
Start: 2021-08-11 | End: 2021-08-19

## 2021-08-11 RX ORDER — CODEINE PHOSPHATE AND GUAIFENESIN 10; 100 MG/5ML; MG/5ML
10 SOLUTION ORAL ONCE
Status: COMPLETED | OUTPATIENT
Start: 2021-08-11 | End: 2021-08-11

## 2021-08-11 RX ORDER — 0.9 % SODIUM CHLORIDE 0.9 %
1000 INTRAVENOUS SOLUTION INTRAVENOUS ONCE
Status: COMPLETED | OUTPATIENT
Start: 2021-08-11 | End: 2021-08-11

## 2021-08-11 RX ORDER — CLINDAMYCIN HYDROCHLORIDE 300 MG/1
300 CAPSULE ORAL 2 TIMES DAILY
Qty: 14 CAPSULE | Refills: 0 | Status: SHIPPED | OUTPATIENT
Start: 2021-08-11 | End: 2021-08-18

## 2021-08-11 RX ADMIN — CLINDAMYCIN PHOSPHATE 600 MG: 600 INJECTION, SOLUTION INTRAVENOUS at 01:57

## 2021-08-11 RX ADMIN — SODIUM CHLORIDE 1000 ML: 9 INJECTION, SOLUTION INTRAVENOUS at 01:58

## 2021-08-11 RX ADMIN — GUAIFENESIN AND CODEINE PHOSPHATE 10 ML: 100; 10 SOLUTION ORAL at 01:40

## 2021-08-11 RX ADMIN — HYDROCODONE BITARTRATE AND ACETAMINOPHEN 1 TABLET: 5; 325 TABLET ORAL at 01:40

## 2021-08-11 NOTE — ED PROVIDER NOTES
Triage Chief Complaint:   Wound Infection, Shortness of Breath, and Cough      [place]:  Savannah Rodriguez is a 55 y.o. male that presents to the emergency department with 2 separate issues. Patient had Irene's gangrene several months ago and follows with Dr. Silvia Terry for wound check. Last saw Dr. Pastor Braden approximately a week and a half ago and states his wound was looking well. Did have granulation tissue without drainage in this area. Patient has a home health nurse and states he was told that his wound had some drainage to it that he should be checked for. Patient also has a dry cough and some mild shortness of breath. .  States some mild aching in his right groin and perineum is a 5 out of 10. He denies any chest pain. No leg edema. No nausea or vomiting. States a mild diarrhea. No known exposure to Covid. Past Medical History:   Diagnosis Date    Abscess 2010    scrotal    Acute renal failure (ARF) ([address] 8/4/2019    Anxiety associated with depression     Anxiety associated with depression     Back pain 7/2/2012    Chest pain 5/1/2013    Diabetic nephropathy ([address]     Diabetic neuropathy ([address] 12/22/2011    Diabetic ulcer of left midfoot associated with type 2 diabetes mellitus, with fat layer exposed ([address] 7/18/2017    Diverticulosis     C scope + Dr. Richmond Parker DM (diabetes mellitus), type 2 ([address] 2002    DR. 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 Richmond Parker Necrotizing fasciitis [facility])     Nose fracture 1988    Panic attacks     Pericarditis 2003    Hospitalized with s/p heart cath normal    Peripheral autonomic neuropathy due to diabetes mellitus ([address]     Axonal EMG- NCS, March 2011    Sebaceous cyst 9/1/2011    URI (upper respiratory infection) 2/27/2012    WD-Wound, surgical, infected, initial encounter 12/8/2017    Wrist fracture 1986     Past Surgical History: Procedure Laterality Date    ABDOMEN SURGERY      CARDIAC CATHETERIZATION  2003, 2013    Normal (Dr. Judi Wu)    COLONOSCOPY  6/2/2011    Pandiverticulosis, Nonbleeding internal hemorrhoids, Repeat colonoscopy at age 48- Dr. Linda Nelson    \"had reconstruction surgery on nose a year after the other nose surgery\"    OTHER SURGICAL HISTORY Right 05/22/2015    I & D right great toe with partial amputation    OTHER SURGICAL HISTORY  12/04/2017    inc and drainage of abcess    CT DEEP INCIS FOOT BONE INFECTN Left 9/22/2018    LEFT FOOT DEBRIDEMENT INCISION AND DRAINAGE TOP AND BOTTOM performed by Raj Goodwin DPM at [facility] N/A 5/15/2021    SCROTAL AND PERINEAL DEBRIDEMENT performed by Gerard Tesfaye MD at [facility] N/A 5/16/2021    SCROTAL RE-DEBRIDEMENT  INCISION AND DRAINAGE performed by Gerard Tesfaye MD at [address] [place] [address] [id number]    sebaceous cyst removal times 4     TOE AMPUTATION      right great toe    TOE AMPUTATION Right 3/23/2019    TOE AMPUTATION RIGHT 5TH TOE performed by Raj Goodwin DPM at [facility] TOE AMPUTATION Right 8/6/2019    TOE AMPUTATION RIGHT 4TH TOE performed by Raj Goodwin DPM at [address] [place] [address] UPPER GASTROINTESTINAL ENDOSCOPY  06/2/2011    Mild gastritis with moderatley severe antritis, small hiatal hernia, Dr. Clay Villareal N/A 1/12/2019    EGD BIOPSY performed by Andres Wilson MD at [facility] History   Problem Relation Age of Onset    Cancer Mother         ?site    Stroke Mother     Bleeding Prob Mother     Diabetes Father     Heart Disease Father     High Blood Pressure Father     Obesity Father     Kidney Disease Father     Diabetes Sister     High Blood Pressure Sister     Mental Illness Sister     Obesity Sister     High Blood Pressure Other     Mental Illness Other         bipolar    Other Son Refill    clindamycin (CLEOCIN) 300 MG capsule Take 1 capsule by mouth 2 times daily for 7 days 14 capsule 0    guaiFENesin-codeine (TUSSI-ORGANIDIN NR) 100-10 MG/5ML syrup Take 5 mLs by mouth 3 times daily as needed for Cough for up to 8 days. 120 mL 0    atorvastatin (LIPITOR) 40 MG tablet Take 1 tablet by mouth nightly 30 tablet 3    metoprolol tartrate (LOPRESSOR) 25 MG tablet Take 1 tablet by mouth 2 times daily 60 tablet 3    amLODIPine (NORVASC) 10 MG tablet Take 1 tablet by mouth daily 30 tablet 3    chlorthalidone (HYGROTON) 25 MG tablet Take 1 tablet by mouth daily 30 tablet 3    rivaroxaban (XARELTO) 10 MG TABS tablet Take 1 tablet by mouth daily (with breakfast) 30 tablet 1    sertraline (ZOLOFT) 50 MG tablet Take 2 tablets by mouth daily 30 tablet 3    tamsulosin (FLOMAX) 0.4 MG capsule Take 1 capsule by mouth daily 30 capsule 3    aspirin 81 MG EC tablet Take 1 tablet by mouth daily 30 tablet 3    linagliptin (TRADJENTA) 5 MG tablet Take 1 tablet by mouth daily 30 tablet 5    ondansetron (ZOFRAN) 4 MG tablet Take 1 tablet by mouth every 8 hours as needed for Nausea or Vomiting 20 tablet 0    Lancets MISC 1 each by Does not apply route daily 100 each 3    glucose monitoring kit (FREESTYLE) monitoring kit 1 each by Does not apply route once for 1 dose. 1 kit 0     Allergies   Allergen Reactions    Adhesive Tape Rash    Doxycycline Nausea And Vomiting    Reglan [Metoclopramide] Anxiety     Nursing Notes Reviewed    ROS:  At least 10 systems reviewed and otherwise negative except as in the [address] Physical Exam:  ED Triage Vitals [08/10/21 1839]   Enc Vitals Group      BP (!) 155/87      Pulse 95      Resp 18      Temp 97.7 °F (36.5 °C)      Temp Source Oral      SpO2 99 %      Weight 267 lb (121.1 kg)      Height 5' 9\" (1.753 m)      Head Circumference       Peak Flow       Pain Score       Pain Loc       Pain Edu? Excl. in [address]?       My pulse oximetry interpretation is which is within the normal range    GENERAL APPEARANCE: Awake and alert. Cooperative. No acute distress. Active dry cough. Cameron Sneedgo HEAD: Normocephalic. Atraumatic. EYES: EOM's grossly intact. Sclera anicteric. ENT: Mucous membranes are moist. Tolerates saliva. No trismus. NECK: Supple. No meningismus. Trachea midline. HEART: RRR. Radial pulses 2+. LUNGS: Respirations unlabored. CTAB. No wheezing or stridor. Active dry cough. Speaks in full sentences. ABDOMEN: Soft. Non-tender. No guarding or rebound. Right groin and perineum with healing granulation tissue to previous surgical wound, small amount scant drainage. No bleeding. No crepitus. No bruising. EXTREMITIES: No acute deformities. SKIN: Warm and dry. NEUROLOGICAL: No gross facial drooping. Moves all 4 extremities spontaneously. PSYCHIATRIC: Normal mood.     I have reviewed and interpreted all of the currently available lab results from this visit (if applicable):  Results for orders placed or performed during the hospital encounter of 08/11/21   CBC auto diff   Result Value Ref Range    WBC 7.4 4.0 - 10.5 K/CU MM    RBC 3.66 (L) 4.6 - 6.2 M/CU MM    Hemoglobin 11.0 (L) 13.5 - 18.0 GM/DL    Hematocrit 33.8 (L) 42 - 52 %    MCV 92.3 78 - 100 FL    MCH 30.1 27 - 31 PG    MCHC 32.5 32.0 - 36.0 %    RDW 13.0 11.7 - 14.9 %    Platelets 830 116 - 550 K/CU MM    MPV 9.6 7.5 - 11.1 FL    Differential Type AUTOMATED DIFFERENTIAL     Segs Relative 71.4 (H) 36 - 66 %    Lymphocytes % 16.1 (L) 24 - 44 %    Monocytes % 9.2 (H) 0 - 4 %    Eosinophils % 2.3 0 - 3 %    Basophils % 0.5 0 - 1 %    Segs Absolute 5.3 K/CU MM    Lymphocytes Absolute 1.2 K/CU MM    Monocytes Absolute 0.7 K/CU MM    Eosinophils Absolute 0.2 K/CU MM    Basophils Absolute 0.0 K/CU MM    Nucleated RBC % 0.0 %    Total Nucleated RBC 0.0 K/CU MM    Total Immature Neutrophil 0.04 K/CU MM    Immature Neutrophil % 0.5 (H) 0 - 0.43 %   CMP   Result Value Ref Range    Sodium 139 135 - 145 MMOL/L    Potassium 4.5 3.5 - 5.1 MMOL/L    Chloride 107 99 - 110 mMol/L    CO2 20 (L) 21 - 32 MMOL/L    BUN 54 (H) 6 - 23 MG/DL    CREATININE 2.3 (H) 0.9 - 1.3 MG/DL    Glucose 124 (H) 70 - 99 MG/DL    Calcium 8.6 8.3 - 10.6 MG/DL    Albumin 3.4 3.4 - 5.0 GM/DL    Total Protein 7.7 6.4 - 8.2 GM/DL    Total Bilirubin 0.1 0.0 - 1.0 MG/DL    ALT 12 10 - 40 U/L    AST 12 (L) 15 - 37 IU/L    Alkaline Phosphatase 89 40 - 129 IU/L    GFR Non- 31 (L) >60 mL/min/1.73m2    GFR  37 (L) >60 mL/min/1.73m2    Anion Gap 12 4 - 16   Lactic Acid, Plasma   Result Value Ref Range    Lactate 1.0 0.4 - 2.0 mMOL/L   Brain Natriuretic Peptide   Result Value Ref Range    Pro-.8 (H) <300 PG/ML        Radiographs:  [] Radiologist's Wet Read Report Reviewed:      XR CHEST PORTABLE (Final result)  Result time 08/10/21 20:06:30  Final result by Olegario Martin MD (08/10/21 20:06:30)                Impression:    No acute process. Narrative:    EXAMINATION:   ONE XRAY VIEW OF THE CHEST     8/10/2021 7:48 pm     COMPARISON:   None. HISTORY:   ORDERING SYSTEM PROVIDED HISTORY: shortness of breath/cough   TECHNOLOGIST PROVIDED HISTORY:   Reason for exam:->shortness of breath/cough   Reason for Exam: shortness of breath/cough   Acuity: Acute   Type of Exam: Initial   Additional signs and symptoms: na   Relevant Medical/Surgical History: diabetes, hypertension     FINDINGS:   The lungs are without acute focal process.  There is no effusion or   pneumothorax. The cardiomediastinal silhouette is without acute process. The   osseous structures are without acute process.                    [] Discussed with Radiologist:     [] The following radiograph was interpreted by myself in the absence of a radiologist:     EKG: (All EKG's are interpreted by myself in the absence of a cardiologist)  The Ekg interpreted by me shows  normal sinus rhythm with a rate of 88  Axis is   Normal  QTc is  normal  Intervals and Durations are made to edit the dictations but occasionally words are mis-transcribed.)    MD Gretchen Conner MD  08/11/21 [id number]

## 2021-08-12 ENCOUNTER — HOSPITAL ENCOUNTER (OUTPATIENT)
Age: 46
Setting detail: SPECIMEN
Discharge: HOME | End: 2021-08-12
Payer: MEDICARE

## 2021-08-12 ENCOUNTER — OFFICE VISIT (OUTPATIENT)
Dept: SURGERY | Age: 46
End: 2021-08-12
Payer: MEDICARE

## 2021-08-12 VITALS
BODY MASS INDEX: 41.16 KG/M2 | SYSTOLIC BLOOD PRESSURE: 112 MMHG | DIASTOLIC BLOOD PRESSURE: 80 MMHG | HEIGHT: 69 IN | OXYGEN SATURATION: 97 % | HEART RATE: 103 BPM | TEMPERATURE: 97.8 F | WEIGHT: 277.9 LBS

## 2021-08-12 DIAGNOSIS — S31.109D RIGHT GROIN WOUND, SUBSEQUENT ENCOUNTER: Primary | ICD-10-CM

## 2021-08-12 DIAGNOSIS — Z87.438 HISTORY OF FOURNIER'S GANGRENE: ICD-10-CM

## 2021-08-12 PROCEDURE — 87070 CULTURE OTHR SPECIMN AEROBIC: CPT

## 2021-08-12 PROCEDURE — 87075 CULTR BACTERIA EXCEPT BLOOD: CPT

## 2021-08-12 PROCEDURE — 99213 OFFICE O/P EST LOW 20 MIN: CPT | Performed by: PHYSICIAN ASSISTANT

## 2021-08-12 ASSESSMENT — ENCOUNTER SYMPTOMS
CHOKING: 0
RECTAL PAIN: 0
APNEA: 0
SORE THROAT: 0
EYE REDNESS: 0
ANAL BLEEDING: 0
STRIDOR: 0
EYE ITCHING: 0
PHOTOPHOBIA: 0
COLOR CHANGE: 0
CONSTIPATION: 0
BACK PAIN: 0

## 2021-08-13 LAB
EKG ATRIAL RATE: 88 BPM
EKG DIAGNOSIS: NORMAL
EKG P AXIS: 29 DEGREES
EKG P-R INTERVAL: 166 MS
EKG Q-T INTERVAL: 364 MS
EKG QRS DURATION: 84 MS
EKG QTC CALCULATION (BAZETT): 440 MS
EKG R AXIS: 23 DEGREES
EKG T AXIS: 63 DEGREES
EKG VENTRICULAR RATE: 88 BPM

## 2021-08-13 PROCEDURE — 93010 ELECTROCARDIOGRAM REPORT: CPT | Performed by: INTERNAL MEDICINE

## 2021-08-13 NOTE — PROGRESS NOTES
DEBRIDEMENT INCISION AND DRAINAGE TOP AND BOTTOM performed by Divina Hernandez DPM at [facility] N/A 5/15/2021    SCROTAL AND PERINEAL DEBRIDEMENT performed by Elliot Woo MD at [facility] N/A 5/16/2021    SCROTAL RE-DEBRIDEMENT  INCISION AND DRAINAGE performed by Elliot Woo MD at [address] [place] [address] [id number]    sebaceous cyst removal times 4     TOE AMPUTATION      right great toe    TOE AMPUTATION Right 3/23/2019    TOE AMPUTATION RIGHT 5TH TOE performed by Divina Hernandez DPM at [facility] TOE AMPUTATION Right 8/6/2019    TOE AMPUTATION RIGHT 4TH TOE performed by Divina Hernandez DPM at [address] [place] [address] UPPER GASTROINTESTINAL ENDOSCOPY  06/2/2011    Mild gastritis with moderatley severe antritis, small hiatal hernia, Dr. Gina Beckwith N/A 1/12/2019    EGD BIOPSY performed by Mitcheal Ormond, MD at [facility] ENDOSCOPY     Past Medical History:   Diagnosis Date    Abscess 2010    scrotal    Acute renal failure (ARF) ([address] 8/4/2019    Anxiety associated with depression     Anxiety associated with depression     Back pain 7/2/2012    Chest pain 5/1/2013    Diabetic nephropathy ([address]     Diabetic neuropathy ([address] 12/22/2011    Diabetic ulcer of left midfoot associated with type 2 diabetes mellitus, with fat layer exposed ([address] 7/18/2017    Diverticulosis     C scope + Dr. Darline Guillaume DM (diabetes mellitus), type 2 ([address] 2002    DR. 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 Darline Guillaume Necrotizing fasciitis [facility])     Nose fracture 1988    Panic attacks     Pericarditis 2003    Hospitalized with s/p heart cath normal    Peripheral autonomic neuropathy due to diabetes mellitus ([address]     Axonal EMG- [facility], March 2011    Sebaceous cyst 9/1/2011    URI (upper respiratory infection) 2/27/2012    WD-Wound, surgical, infected, initial encounter 12/8/2017    Wrist fracture 1986     Family History   Problem Relation Age of Onset   [employer] Cancer Mother         ?site   [employer] Stroke Mother     Bleeding Prob Mother     Diabetes Father     Heart Disease Father     High Blood Pressure Father     Obesity Father     Kidney Disease Father     Diabetes Sister     High Blood Pressure Sister     Mental Illness Sister     Obesity Sister     High Blood Pressure Other     Mental Illness Other         bipolar    Other Son         cyst in ear canal    ADHD Daughter      Social History     Socioeconomic History    Marital status:      Spouse name: Not on file    Number of children: Not on file    Years of education: Not on file    Highest education level: Not on file   Occupational History    Not on file   Tobacco Use    Smoking status: Former Smoker     Years: 20.00     Quit date: 11/18/2017     Years since quitting: 3.7    Smokeless tobacco: Never Used   Vaping Use    Vaping Use: Never used   Substance and Sexual Activity    Alcohol use: Yes     Comment: occ    Drug use: Yes     Frequency: 7.0 times per week     Types: Marijuana    Sexual activity: Yes     Partners: Female   Other Topics Concern    Not on file   Social History Narrative    Not on file     Social Determinants of Health     Financial Resource Strain:     Difficulty of Paying Living Expenses:    Food Insecurity:     Worried About Running Out of Food in the Last Year:     Ran Out of Food in the Last Year:    Transportation Needs:     Lack of Transportation (Medical):      Lack of Transportation (Non-Medical):    Physical Activity:     Days of Exercise per Week:     Minutes of Exercise per Session:    Stress:     Feeling of Stress :    Social Connections:     Frequency of Communication with Friends and Family:     Frequency of Social Gatherings with Friends and Family:     Attends [employer] Services:     Active Member of [employer] or Organizations:     Attends Club or Organization Meetings:     Marital Status:    Intimate Partner Violence:     Fear of Current or Ex-Partner:     Emotionally Abused:     Physically Abused:     Sexually Abused:        Current Outpatient Medications   Medication Sig Dispense Refill    clindamycin (CLEOCIN) 300 MG capsule Take 1 capsule by mouth 2 times daily for 7 days 14 capsule 0    guaiFENesin-codeine (TUSSI-ORGANIDIN NR) 100-10 MG/5ML syrup Take 5 mLs by mouth 3 times daily as needed for Cough for up to 8 days. 120 mL 0    atorvastatin (LIPITOR) 40 MG tablet Take 1 tablet by mouth nightly 30 tablet 3    metoprolol tartrate (LOPRESSOR) 25 MG tablet Take 1 tablet by mouth 2 times daily 60 tablet 3    amLODIPine (NORVASC) 10 MG tablet Take 1 tablet by mouth daily 30 tablet 3    chlorthalidone (HYGROTON) 25 MG tablet Take 1 tablet by mouth daily 30 tablet 3    sertraline (ZOLOFT) 50 MG tablet Take 2 tablets by mouth daily 30 tablet 3    tamsulosin (FLOMAX) 0.4 MG capsule Take 1 capsule by mouth daily 30 capsule 3    aspirin 81 MG EC tablet Take 1 tablet by mouth daily 30 tablet 3    linagliptin (TRADJENTA) 5 MG tablet Take 1 tablet by mouth daily 30 tablet 5    ondansetron (ZOFRAN) 4 MG tablet Take 1 tablet by mouth every 8 hours as needed for Nausea or Vomiting 20 tablet 0    Lancets MISC 1 each by Does not apply route daily 100 each 3    rivaroxaban (XARELTO) 10 MG TABS tablet Take 1 tablet by mouth daily (with breakfast) 30 tablet 1    glucose monitoring kit (FREESTYLE) monitoring kit 1 each by Does not apply route once for 1 dose. 1 kit 0     No current facility-administered medications for this visit. Allergies   Allergen Reactions    Adhesive Tape Rash    Doxycycline Nausea And Vomiting    Reglan [Metoclopramide] Anxiety       Review of Systems:       Review of Systems   Constitutional: Negative for chills and fever.    HENT: Negative for ear pain, mouth sores, sore throat and tinnitus. Eyes: Negative for photophobia, redness and itching. Respiratory: Negative for apnea, choking and stridor. Cardiovascular: Negative for chest pain and palpitations. Gastrointestinal: Negative for anal bleeding, constipation and rectal pain. Endocrine: Negative for polydipsia. Genitourinary: Negative for enuresis, flank pain and hematuria. Musculoskeletal: Negative for back pain, joint swelling and myalgias. Skin: Positive for wound. Negative for color change and pallor. Allergic/Immunologic: Negative for environmental allergies. Neurological: Negative for syncope and speech difficulty. Psychiatric/Behavioral: Negative for confusion and hallucinations. OBJECTIVE:  Physical Exam:    /80   Pulse 103   Temp 97.8 °F (36.6 °C)   Ht 5' 9\" (1.753 m)   Wt 277 lb 14.4 oz (126.1 kg)   SpO2 97%   BMI 41.04 kg/m²      Physical Exam  Genitourinary:         Comments: R groin wound with mild reactive erythema and induration of the surrounding tissues, consistent with mild cellulitis. No fluctuance or fluid collection noted. No active drainage. Wound is approximately the size of a nickel. Granulation tissue throughout. ASSESSMENT:  1. Right groin wound, subsequent encounter    2. History of Irene's gangrene          PLAN:  Treatment:  Wound culture obtained. Will follow results and adjust abx as needed. In the meantime, pt is to complete his abx that were given from the ED as prescribed. Will begin local wound care with silver alginate and a dry dressing. This is to be applied daily after a shower Pt will f/u in 2 weeks with Dr. Oscar Figueroa for a wound check. He will return sooner if there are any new or worsening symptoms. Patient counseled on risks, benefits, and alternatives of treatment plan at length. Patient states an understanding and willingness to proceed with plan.     Orders Placed This Encounter   Procedures    Culture, Wound        No orders of the defined types were placed in this encounter. Follow Up:  Return in about 2 weeks (around 8/26/2021) for Wound check with Dr. Priscila Rossi.       Kathi Alexander PA-C

## 2021-08-17 LAB
CULTURE: NORMAL
Lab: NORMAL
SPECIMEN: NORMAL

## 2021-09-09 ENCOUNTER — OFFICE VISIT (OUTPATIENT)
Dept: BARIATRICS/WEIGHT MGMT | Age: 46
End: 2021-09-09
Payer: MEDICARE

## 2021-09-09 VITALS
HEIGHT: 69 IN | DIASTOLIC BLOOD PRESSURE: 74 MMHG | BODY MASS INDEX: 42.18 KG/M2 | WEIGHT: 284.8 LBS | SYSTOLIC BLOOD PRESSURE: 130 MMHG | HEART RATE: 88 BPM

## 2021-09-09 DIAGNOSIS — Z87.438 HISTORY OF FOURNIER'S GANGRENE: Primary | ICD-10-CM

## 2021-09-09 PROCEDURE — 99213 OFFICE O/P EST LOW 20 MIN: CPT | Performed by: SURGERY

## 2021-09-09 PROCEDURE — 1036F TOBACCO NON-USER: CPT | Performed by: SURGERY

## 2021-09-09 PROCEDURE — G8417 CALC BMI ABV UP PARAM F/U: HCPCS | Performed by: SURGERY

## 2021-09-09 PROCEDURE — G8428 CUR MEDS NOT DOCUMENT: HCPCS | Performed by: SURGERY

## 2021-09-09 ASSESSMENT — ENCOUNTER SYMPTOMS
GASTROINTESTINAL NEGATIVE: 1
EYES NEGATIVE: 1
ALLERGIC/IMMUNOLOGIC NEGATIVE: 1
RESPIRATORY NEGATIVE: 1

## 2021-09-09 NOTE — PROGRESS NOTES
No chief complaint on file. SUBJECTIVE:  HPI: Patient is here with complaints of:    F/u for right groin wound check. Pt known to me from previous tolu gangrene surgery    Pt was seen by me on 7/29/21. Pt went to ED on 8/10/21 for cough and wound check at recommendation of [facility]. Pt was seen by YONATHAN Mortensen on 8/12/21 bc I was on approved time off. Pt had CT at ED and also Cx done in office on 8/12/21. Since that time pt has been doing well. Denies F/C. Has been doing local wound care. Denies drainage. States his BS have been around 140 avg. I have reviewed the patient's(pertinent information to this visit) medical history, family history(scanned in  the [address] [place] [address] under \"patient questioner\"), social history and review of systems with the patient today in the office.             Past Surgical History:   Procedure Laterality Date    ABDOMEN SURGERY      CARDIAC CATHETERIZATION  2003, 2013    Normal (Dr. Coleman Montana)    COLONOSCOPY  6/2/2011    Pandiverticulosis, Nonbleeding internal hemorrhoids, Repeat colonoscopy at age 48- Dr. Milena Smith    \"had reconstruction surgery on nose a year after the other nose surgery\"    OTHER SURGICAL HISTORY Right 05/22/2015    I & D right great toe with partial amputation    OTHER SURGICAL HISTORY  12/04/2017    inc and drainage of abcess    [place] DEEP INCIS FOOT BONE INFECTN Left 9/22/2018    LEFT FOOT DEBRIDEMENT INCISION AND DRAINAGE TOP AND BOTTOM performed by Arthur Bran DPM at [facility] N/A 5/15/2021    SCROTAL AND PERINEAL DEBRIDEMENT performed by Bertha Davidson MD at [address] [place] [address] 5/16/2021    SCROTAL RE-DEBRIDEMENT  INCISION AND DRAINAGE performed by Bertha Davidson MD at [address] [place] [address] [phone]    sebaceous cyst removal times 4     TOE AMPUTATION      right great toe    TOE AMPUTATION Right 3/23/2019    TOE AMPUTATION RIGHT 5TH TOE performed by Arthur Bran DPM at [place] [address]  TOE AMPUTATION Right 8/6/2019    TOE AMPUTATION RIGHT 4TH TOE performed by Dorothy Min DPM at [address] [place] [address] UPPER GASTROINTESTINAL ENDOSCOPY  06/2/2011    Mild gastritis with moderatley severe antritis, small hiatal hernia, Dr. Pascual Cevallos N/A 1/12/2019    EGD BIOPSY performed by Nelda Barajas MD at [facility] ENDOSCOPY     Past Medical History:   Diagnosis Date    Abscess 2010    scrotal    Acute renal failure (ARF) ([address] 8/4/2019    Anxiety associated with depression     Anxiety associated with depression     Back pain 7/2/2012    Chest pain 5/1/2013    Diabetic nephropathy ([address]     Diabetic neuropathy ([address] 12/22/2011    Diabetic ulcer of left midfoot associated with type 2 diabetes mellitus, with fat layer exposed ([address] 7/18/2017    Diverticulosis     C scope + Dr. Erin Jain DM (diabetes mellitus), type 2 ([address] 2002    DR. 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 Erin Jain Necrotizing fasciitis [facility])     Nose fracture 1988    Panic attacks     Pericarditis 2003    Hospitalized with s/p heart cath normal    Peripheral autonomic neuropathy due to diabetes mellitus ([address]     Axonal EMG- [facility], March 2011    Sebaceous cyst 9/1/2011    URI (upper respiratory infection) 2/27/2012    WD-Wound, surgical, infected, initial encounter 12/8/2017    Wrist fracture 1986     Family History   Problem Relation Age of Onset    Cancer Mother         ?site   Josiah Loser Stroke Mother     Bleeding Prob Mother     Diabetes Father     Heart Disease Father     High Blood Pressure Father     Obesity Father     Kidney Disease Father     Diabetes Sister     High Blood Pressure Sister     Mental Illness Sister     Obesity Sister     High Blood Pressure Other     Mental Illness Other         bipolar    Other Son         cyst in ear canal 3    chlorthalidone (HYGROTON) 25 MG tablet Take 1 tablet by mouth daily 30 tablet 3    rivaroxaban (XARELTO) 10 MG TABS tablet Take 1 tablet by mouth daily (with breakfast) 30 tablet 1    sertraline (ZOLOFT) 50 MG tablet Take 2 tablets by mouth daily 30 tablet 3    tamsulosin (FLOMAX) 0.4 MG capsule Take 1 capsule by mouth daily 30 capsule 3    aspirin 81 MG EC tablet Take 1 tablet by mouth daily 30 tablet 3    linagliptin (TRADJENTA) 5 MG tablet Take 1 tablet by mouth daily 30 tablet 5    ondansetron (ZOFRAN) 4 MG tablet Take 1 tablet by mouth every 8 hours as needed for Nausea or Vomiting 20 tablet 0    Lancets MISC 1 each by Does not apply route daily 100 each 3    glucose monitoring kit (FREESTYLE) monitoring kit 1 each by Does not apply route once for 1 dose. 1 kit 0     No current facility-administered medications for this visit. Allergies   Allergen Reactions    Adhesive Tape Rash    Doxycycline Nausea And Vomiting    Reglan [Metoclopramide] Anxiety       Review of Systems:         Review of Systems   Constitutional: Negative for chills and fever. HENT: Negative. Eyes: Negative. Respiratory: Negative. Cardiovascular: Negative. Gastrointestinal: Negative. Endocrine: Negative. Genitourinary: Negative. Musculoskeletal: Negative. Skin: Positive for wound. Allergic/Immunologic: Negative. Neurological: Negative. Hematological: Negative. Psychiatric/Behavioral: Negative. OBJECTIVE:  Physical Exam:    Ht 5' 9\" (1.753 m)   Wt 284 lb 12.8 oz (129.2 kg)   BMI 42.06 kg/m²      Physical Exam  Vitals reviewed. Constitutional:       General: He is not in acute distress. Appearance: He is obese. He is not ill-appearing, toxic-appearing or diaphoretic. HENT:      Head: Normocephalic and atraumatic. Right Ear: External ear normal.      Left Ear: External ear normal.      Nose: Nose normal.   Eyes:      General:         Right eye: No discharge. Left eye: No discharge. Extraocular Movements: Extraocular movements intact. Cardiovascular:      Rate and Rhythm: Normal rate. Pulmonary:      Effort: No respiratory distress. Abdominal:      Palpations: Abdomen is soft. Genitourinary:     Comments: Right groin wound healing appropriately, no odor, no drainage, good granulation tissues, placed dressing    Musculoskeletal:         General: No swelling. Cervical back: Normal range of motion. Skin:     General: Skin is warm. Neurological:      General: No focal deficit present. Mental Status: He is alert. Psychiatric:         Mood and Affect: Mood normal.             CT A/P:  1. Minimal stranding and skin thickening is seen within the right   groin/perineum which could be related to cellulitis/scarring. Dennis Deysi is no   evidence of soft tissue gas or abscess. 2. No acute findings within the abdomen and pelvis. 3. Colonic diverticulosis. CX:  Final Report Mixed skin alexsander,  Light growth No further workup No anaerobes isolated     ASSESSMENT:  1. History of Irene's gangrene          PLAN:  Treatment:      -Reviewed CT A/P results from 8/10/21.     -Reviewed wound Cx results from 8/12/21.     -Continue daily dressing changes until wound closes. -F/u in 4-8 weeks.    -Pt interested in weight loss surgery. Reviewed programs (med vs diet / exercise vs surgery). Will have [place] check on insurance benefits and set up a formal appointment to go over. Would likely benefit his HTN, DM, and HLD. Surya Gifford Patient counseled on risks, benefits, and alternatives of treatment plan at length today. Patient states an understanding and willingness to proceed with plan. No orders of the defined types were placed in this encounter. No orders of the defined types were placed in this encounter. Follow Up:  No follow-ups on file.       Chen Maier MD

## 2021-09-17 ENCOUNTER — APPOINTMENT (OUTPATIENT)
Dept: ULTRASOUND IMAGING | Age: 46
End: 2021-09-17
Payer: MEDICARE

## 2021-09-17 ENCOUNTER — APPOINTMENT (OUTPATIENT)
Dept: GENERAL RADIOLOGY | Age: 46
End: 2021-09-17
Payer: MEDICARE

## 2021-09-17 ENCOUNTER — HOSPITAL ENCOUNTER (EMERGENCY)
Age: 46
Discharge: HOME | End: 2021-09-17
Payer: MEDICARE

## 2021-09-17 VITALS
OXYGEN SATURATION: 100 % | HEART RATE: 92 BPM | HEIGHT: 69 IN | TEMPERATURE: 98.3 F | RESPIRATION RATE: 16 BRPM | SYSTOLIC BLOOD PRESSURE: 144 MMHG | WEIGHT: 280 LBS | BODY MASS INDEX: 41.47 KG/M2 | DIASTOLIC BLOOD PRESSURE: 100 MMHG

## 2021-09-17 DIAGNOSIS — L97.429 DIABETIC ULCER OF LEFT MIDFOOT ASSOCIATED WITH TYPE 2 DIABETES MELLITUS, UNSPECIFIED ULCER STAGE (HCC): ICD-10-CM

## 2021-09-17 DIAGNOSIS — R07.9 CHEST PAIN, UNSPECIFIED TYPE: Primary | ICD-10-CM

## 2021-09-17 DIAGNOSIS — E11.621 DIABETIC ULCER OF LEFT MIDFOOT ASSOCIATED WITH TYPE 2 DIABETES MELLITUS, UNSPECIFIED ULCER STAGE (HCC): ICD-10-CM

## 2021-09-17 LAB
ALBUMIN SERPL-MCNC: 2.8 GM/DL (ref 3.4–5)
ALP BLD-CCNC: 92 IU/L (ref 40–128)
ALT SERPL-CCNC: 11 U/L (ref 10–40)
ANION GAP SERPL CALCULATED.3IONS-SCNC: 13 MMOL/L (ref 4–16)
AST SERPL-CCNC: 12 IU/L (ref 15–37)
BASOPHILS ABSOLUTE: 0 K/CU MM
BASOPHILS RELATIVE PERCENT: 0.5 % (ref 0–1)
BILIRUB SERPL-MCNC: 0.2 MG/DL (ref 0–1)
BUN BLDV-MCNC: 54 MG/DL (ref 6–23)
CALCIUM SERPL-MCNC: 8.1 MG/DL (ref 8.3–10.6)
CHLORIDE BLD-SCNC: 106 MMOL/L (ref 99–110)
CO2: 16 MMOL/L (ref 21–32)
CREAT SERPL-MCNC: 2.6 MG/DL (ref 0.9–1.3)
DIFFERENTIAL TYPE: ABNORMAL
EOSINOPHILS ABSOLUTE: 0.2 K/CU MM
EOSINOPHILS RELATIVE PERCENT: 2.1 % (ref 0–3)
GFR AFRICAN AMERICAN: 32 ML/MIN/1.73M2
GFR NON-AFRICAN AMERICAN: 27 ML/MIN/1.73M2
GLUCOSE BLD-MCNC: 160 MG/DL (ref 70–99)
GLUCOSE BLD-MCNC: 195 MG/DL (ref 70–99)
HCT VFR BLD CALC: 34.5 % (ref 42–52)
HEMOGLOBIN: 11.3 GM/DL (ref 13.5–18)
IMMATURE NEUTROPHIL %: 0.7 % (ref 0–0.43)
LACTATE: 1.2 MMOL/L (ref 0.4–2)
LYMPHOCYTES ABSOLUTE: 1.2 K/CU MM
LYMPHOCYTES RELATIVE PERCENT: 14.5 % (ref 24–44)
MCH RBC QN AUTO: 29.2 PG (ref 27–31)
MCHC RBC AUTO-ENTMCNC: 32.8 % (ref 32–36)
MCV RBC AUTO: 89.1 FL (ref 78–100)
MONOCYTES ABSOLUTE: 0.7 K/CU MM
MONOCYTES RELATIVE PERCENT: 9 % (ref 0–4)
NUCLEATED RBC %: 0 %
PDW BLD-RTO: 12.7 % (ref 11.7–14.9)
PLATELET # BLD: 254 K/CU MM (ref 140–440)
PMV BLD AUTO: 10.3 FL (ref 7.5–11.1)
POTASSIUM SERPL-SCNC: 4.9 MMOL/L (ref 3.5–5.1)
PRO-BNP: 913.1 PG/ML
RBC # BLD: 3.87 M/CU MM (ref 4.6–6.2)
SARS-COV-2, NAAT: NOT DETECTED
SEGMENTED NEUTROPHILS ABSOLUTE COUNT: 6 K/CU MM
SEGMENTED NEUTROPHILS RELATIVE PERCENT: 73.2 % (ref 36–66)
SODIUM BLD-SCNC: 135 MMOL/L (ref 135–145)
SOURCE: NORMAL
TOTAL IMMATURE NEUTOROPHIL: 0.06 K/CU MM
TOTAL NUCLEATED RBC: 0 K/CU MM
TOTAL PROTEIN: 6 GM/DL (ref 6.4–8.2)
TROPONIN T: 0.04 NG/ML
WBC # BLD: 8.3 K/CU MM (ref 4–10.5)

## 2021-09-17 PROCEDURE — 73630 X-RAY EXAM OF FOOT: CPT

## 2021-09-17 PROCEDURE — 80053 COMPREHEN METABOLIC PANEL: CPT

## 2021-09-17 PROCEDURE — 93005 ELECTROCARDIOGRAM TRACING: CPT | Performed by: PHYSICIAN ASSISTANT

## 2021-09-17 PROCEDURE — 93971 EXTREMITY STUDY: CPT

## 2021-09-17 PROCEDURE — 99285 EMERGENCY DEPT VISIT HI MDM: CPT

## 2021-09-17 PROCEDURE — 84484 ASSAY OF TROPONIN QUANT: CPT

## 2021-09-17 PROCEDURE — 83605 ASSAY OF LACTIC ACID: CPT

## 2021-09-17 PROCEDURE — 83880 ASSAY OF NATRIURETIC PEPTIDE: CPT

## 2021-09-17 PROCEDURE — 6370000000 HC RX 637 (ALT 250 FOR IP): Performed by: PHYSICIAN ASSISTANT

## 2021-09-17 PROCEDURE — 87635 SARS-COV-2 COVID-19 AMP PRB: CPT

## 2021-09-17 PROCEDURE — 85025 COMPLETE CBC W/AUTO DIFF WBC: CPT

## 2021-09-17 PROCEDURE — 71045 X-RAY EXAM CHEST 1 VIEW: CPT

## 2021-09-17 PROCEDURE — 82962 GLUCOSE BLOOD TEST: CPT

## 2021-09-17 RX ORDER — HYDROCODONE BITARTRATE AND ACETAMINOPHEN 5; 325 MG/1; MG/1
1 TABLET ORAL ONCE
Status: COMPLETED | OUTPATIENT
Start: 2021-09-17 | End: 2021-09-17

## 2021-09-17 RX ORDER — SULFAMETHOXAZOLE AND TRIMETHOPRIM 800; 160 MG/1; MG/1
1 TABLET ORAL 2 TIMES DAILY
Qty: 14 TABLET | Refills: 0 | Status: SHIPPED | OUTPATIENT
Start: 2021-09-17 | End: 2021-09-24

## 2021-09-17 RX ORDER — CEPHALEXIN 500 MG/1
500 CAPSULE ORAL 4 TIMES DAILY
Qty: 28 CAPSULE | Refills: 0 | Status: SHIPPED | OUTPATIENT
Start: 2021-09-17 | End: 2021-09-24

## 2021-09-17 RX ADMIN — HYDROCODONE BITARTRATE AND ACETAMINOPHEN 1 TABLET: 5; 325 TABLET ORAL at 19:20

## 2021-09-17 ASSESSMENT — PAIN DESCRIPTION - LOCATION: LOCATION: CHEST;LEG

## 2021-09-17 ASSESSMENT — PAIN DESCRIPTION - ORIENTATION: ORIENTATION: LEFT

## 2021-09-17 ASSESSMENT — HEART SCORE: ECG: 0

## 2021-09-17 ASSESSMENT — PAIN SCALES - GENERAL
PAINLEVEL_OUTOF10: 7
PAINLEVEL_OUTOF10: 7

## 2021-09-18 NOTE — ED PROVIDER NOTES
Patient Identification  Genny Trujillo is a 55 y.o. male    Chief Complaint  Chest Pain and Wound Check      HPI  (History provided by patient)  This is a 55 y.o. male with history of diabetes, pericarditis, diabetic neuropathy, hyperlipidemia, hypertension who was brought in by self for chief complaint of chest pain and wound check. Chest pain began a week ago, constant since onset, located over the left anterior chest with radiation in the left shoulder. Worse with movement of left arm and deep breaths. Notes associated dry cough and mild shortness of breath. No fevers. Had cardiac cath in 2013 which was negative. Had stress test in 2021 that demonstrated some possible lateral wall ischemia, work-up apparently was delayed because he was recovering from 40 years gangrene. Patient also has an ulcer on his left foot, he is diabetic, states that over the last week ulcer is been hurting and he has had some swelling in the foot and lower leg. He denies any drainage. States he had a scab form around the wound which is new. No fevers. Not on antibiotics. Has not seen wound care, states he changed insurance and has been unable to follow-up secondary to this. REVIEW OF SYSTEMS    Constitutional:  Denies fever, chills  HENT:  Denies sore throat or ear pain   Eyes: Denies vision changes, eye pain  Cardiovascular:  Denies syncope.  + CP  Respiratory:  Denies shortness of breath, cough   GI:  Denies abdominal pain, nausea, vomiting  :  Denies dysuria, discharge  Musculoskeletal:  Denies back pain, joint pain  Skin:  Denies rash, pruritis  Neurologic:  Denies headache, focal weakness, or sensory changes     See HPI and nursing notes for additional information     I have reviewed the following nursing documentation:  Allergies:    Allergies   Allergen Reactions    Adhesive Tape Rash    Doxycycline Nausea And Vomiting    Reglan [Metoclopramide] Anxiety       Past medical history:  has a past medical history of Abscess (2010), Acute renal failure (ARF) ([facility] [address] (8/4/2019), Anxiety associated with depression, Anxiety associated with depression, Back pain (7/2/2012), Chest pain (5/1/2013), Diabetic nephropathy ([address] Diabetic neuropathy ([address] (12/22/2011), Diabetic ulcer of left midfoot associated with type 2 diabetes mellitus, with fat layer exposed ([address] (7/18/2017), Diverticulosis, DM (diabetes mellitus), type 2 ([address] (2002), 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 Past surgical history:  has a past surgical history that includes Cardiac catheterization (2003, 2013); Tonsillectomy and adenoidectomy (1988); Upper gastrointestinal endoscopy (06/2/2011); Colonoscopy (6/2/2011); Nose surgery (1993); skin biopsy (N/A, [id number]); other surgical history (Right, 05/22/2015); Toe amputation; Abdomen surgery; other surgical history (12/04/2017); pr deep incis foot bone infectn (Left, 9/22/2018); Upper gastrointestinal endoscopy (N/A, 1/12/2019); Toe amputation (Right, 3/23/2019); Toe amputation (Right, 8/6/2019); Scrotal surgery (N/A, 5/15/2021); and Scrotal surgery (N/A, 5/16/2021). Home medications:   Prior to Admission medications    Medication Sig Start Date End Date Taking?  Authorizing Provider   atorvastatin (LIPITOR) 40 MG tablet Take 1 tablet by mouth nightly 6/15/21   Марина Mckeon MD   metoprolol tartrate (LOPRESSOR) 25 MG tablet Take 1 tablet by mouth 2 times daily 6/15/21   Марина Mckeon MD   amLODIPine (NORVASC) 10 MG tablet Take 1 tablet by mouth daily 6/15/21   Марина Mckeon MD   chlorthalidone (HYGROTON) 25 MG tablet Take 1 tablet by mouth daily 6/15/21   Petty Vázquez MD   rivaroxaban (XARELTO) 10 MG TABS tablet Take 1 tablet by mouth daily (with breakfast) 6/15/21 7/15/21  Petty Vázquez MD   sertraline (ZOLOFT) 50 MG tablet Take 2 tablets by mouth daily 5/29/21   Petty Vázquez MD   tamsulosin (FLOMAX) 0.4 MG capsule Take 1 capsule by mouth daily 5/22/21   Demetria Murray MD   aspirin 81 MG EC tablet Take 1 tablet by mouth daily 4/5/21   Fredy Hernandez MD   linagliptin (TRADJENTA) 5 MG tablet Take 1 tablet by mouth daily 3/29/21   Petty Vázquez MD   ondansetron (ZOFRAN) 4 MG tablet Take 1 tablet by mouth every 8 hours as needed for Nausea or Vomiting 9/23/18   Jaleesa Alonzo MD   Lancets MISC 1 each by Does not apply route daily 9/21/18   Jaleesa Alonzo MD   glucose monitoring kit (FREESTYLE) monitoring kit 1 each by Does not apply route once for 1 dose. 6/20/13 6/20/13  Kalyan Whipple MD       Social history:  reports that he quit smoking about 3 years ago. He quit after 20.00 years of use. He has never used smokeless tobacco. He reports current alcohol use. He reports current drug use. Frequency: 7.00 times per week. Drug: Marijuana. Family history:    Family History   Problem Relation Age of Onset   Gloriajean Seeds Cancer Mother         ?site   Gloriajean Seeds Stroke Mother     Bleeding Prob Mother     Diabetes Father     Heart Disease Father     High Blood Pressure Father     Obesity Father     Kidney Disease Father     Diabetes Sister     High Blood Pressure Sister     Mental Illness Sister     Obesity Sister     High Blood Pressure Other     Mental Illness Other         bipolar    Other Son         cyst in ear canal    ADHD Daughter          Exam  BP (!) 158/102   Pulse 104   Temp 98.3 °F (36.8 °C) (Oral)   Resp 16   Ht 5' 9\" (1.753 m)   Wt 280 lb (127 kg)   SpO2 97%   BMI 41.35 kg/m²   Nursing note and vitals reviewed. Constitutional: Well developed, well nourished.  No acute distress. HENT:      Head: Normocephalic and atraumatic. Ears: External ears normal.      Nose: Nose normal.     Mouth: Membrane mucosa moist and pink. No posterior oropharynx erythema or tonsillar edema  Eyes: Anicteric sclera. No discharge, PERRL  Neck: Supple. Trachea midline. Cardiovascular: RRR, no murmurs, rubs, or gallops, radial pulses 2+ bilaterally. Pulmonary/Chest: Effort normal. No respiratory distress. CTAB. No stridor. No wheezes. No rales. Abdominal: Soft. Nontender to palpation. No distension. No guarding, rebound tenderness, or evidence of ascites. : No CVA tenderness. Musculoskeletal: Moves all extremities. No gross deformity. Neurological: Alert and oriented to person, place, and time. Normal muscle tone. Skin: Warm and dry. Patient has a reddish-black raised scabbed area approximately 3 cm in diameter noted on the plantar left midfoot with a 5 mm central area that is open and extends to approximately the muscular layer of the foot. No drainage. There is some mild edema of the left foot and ankle noted. Psychiatric: Normal mood and affect. Behavior is normal.      EKG   Please see Dr. Tamra Dodge note for EKG read. Radiographs (if obtained):  [] The following radiograph was interpreted by myself in the absence of a radiologist:   [x] Radiologist's Report Reviewed:  XR CHEST PORTABLE   Final Result   No acute process. XR FOOT LEFT (MIN 3 VIEWS)   Final Result   Plantar soft tissue ulceration      No destructive bony abnormality identified         VL DUP LOWER EXTREMITY VENOUS LEFT   Final Result   No evidence of DVT in the left lower extremity.                 Labs  Results for orders placed or performed during the hospital encounter of 09/17/21   COVID-19, Rapid    Specimen: Nasopharyngeal   Result Value Ref Range    Source THROAT     SARS-CoV-2, NAAT NOT DETECTED NOT DETECTED   CBC Auto Differential   Result Value Ref Range    WBC 8.3 4.0 - 10.5 K/CU MM    RBC 3.87 (L) 4.6 - 6.2 M/CU MM    Hemoglobin 11.3 (L) 13.5 - 18.0 GM/DL    Hematocrit 34.5 (L) 42 - 52 %    MCV 89.1 78 - 100 FL    MCH 29.2 27 - 31 PG    MCHC 32.8 32.0 - 36.0 %    RDW 12.7 11.7 - 14.9 %    Platelets 868 484 - 306 K/CU MM    MPV 10.3 7.5 - 11.1 FL    Differential Type AUTOMATED DIFFERENTIAL     Segs Relative 73.2 (H) 36 - 66 %    Lymphocytes % 14.5 (L) 24 - 44 %    Monocytes % 9.0 (H) 0 - 4 %    Eosinophils % 2.1 0 - 3 %    Basophils % 0.5 0 - 1 %    Segs Absolute 6.0 K/CU MM    Lymphocytes Absolute 1.2 K/CU MM    Monocytes Absolute 0.7 K/CU MM    Eosinophils Absolute 0.2 K/CU MM    Basophils Absolute 0.0 K/CU MM    Nucleated RBC % 0.0 %    Total Nucleated RBC 0.0 K/CU MM    Total Immature Neutrophil 0.06 K/CU MM    Immature Neutrophil % 0.7 (H) 0 - 0.43 %   Comprehensive Metabolic Panel w/ Reflex to MG   Result Value Ref Range    Sodium 135 135 - 145 MMOL/L    Potassium 4.9 3.5 - 5.1 MMOL/L    Chloride 106 99 - 110 mMol/L    CO2 16 (L) 21 - 32 MMOL/L    BUN 54 (H) 6 - 23 MG/DL    CREATININE 2.6 (H) 0.9 - 1.3 MG/DL    Glucose 160 (H) 70 - 99 MG/DL    Calcium 8.1 (L) 8.3 - 10.6 MG/DL    Albumin 2.8 (L) 3.4 - 5.0 GM/DL    Total Protein 6.0 (L) 6.4 - 8.2 GM/DL    Total Bilirubin 0.2 0.0 - 1.0 MG/DL    ALT 11 10 - 40 U/L    AST 12 (L) 15 - 37 IU/L    Alkaline Phosphatase 92 40 - 128 IU/L    GFR Non- 27 (L) >60 mL/min/1.73m2    GFR  32 (L) >60 mL/min/1.73m2    Anion Gap 13 4 - 16   Troponin   Result Value Ref Range    Troponin T 0.041 (H) <0.01 NG/ML   Brain Natriuretic Peptide   Result Value Ref Range    Pro-.1 (H) <300 PG/ML   Lactic Acid, Plasma   Result Value Ref Range    Lactate 1.2 0.4 - 2.0 mMOL/L   POCT Glucose   Result Value Ref Range    POC Glucose 195 (H) 70 - 99 MG/DL         MDM  Patient presents for chest pain for a week. He also is having more pain in left foot and has ulcer on foot that has large scabbed callus, no obvious infection.   X-ray of left foot is negative. DVT ultrasound of left leg is negative. Chest x-ray negative. EKG shows normal sinus rhythm. Troponin slightly elevated, appears to be at baseline. Has chronic kidney disease slightly worse than usual.  Lactic acid and white blood cell count are normal.  Heart score is 3. Delta troponin ordered. 9:01 PM EDT I have signed out [facility] Emergency Department care to Joselito Becerra PA-C. We discussed the history, physical exam, completed/pending test results (if obtained) and current treatment plan. Please refer to his/her chart for the patients further details, remaining Emergency Department course, final disposition and diagnosis. I have independently evaluated this patient. Final Impression  1. Chest pain, unspecified type    2. Diabetic ulcer of left midfoot associated with type 2 diabetes mellitus, unspecified ulcer stage (HCC)        Blood pressure (!) 158/102, pulse 104, temperature 98.3 °F (36.8 °C), temperature source Oral, resp. rate 16, height 5' 9\" (1.753 m), weight 280 lb (127 kg), SpO2 97 %. Patient was given scripts for the following medications. I counseled patient how to take these medications. New Prescriptions    No medications on file       This chart was generated using the [address] [place] [address] dictation system. I created this record but it may contain dictation errors given the limitations of this technology.        [address] [facility] CenterYONATHAN  09/17/21 [id number]

## 2021-09-18 NOTE — ED PROVIDER NOTES
9/17/2021 8:36 PM EDT  Purnima Bagley was checked out to me by TARUN Pulliam PA-C. Please see his/her initial documentation for details of the patient's ED presentation, physical exam and completed studies. In brief, Purnima Bagley presented for chest pain x1 week, left anterior radiating to left arm with mild SOB and cough. No history of CAD, but has previous heart caths that showed narrowing but did not require intervention. Follows with Dr. Shanika Lynn. Also has history of DM with left foot pain and concern for ulcer worse over the last week with left leg pain. Has not followed up with wound care center. HEART score calculated by initial ED provider is 3. On my exam, patient is resting comfortably. In no acute respiratory distress. Regular rate and rhythm. Normal S1/S2. There is a thick black eschar with central ulceration to the plantar aspect of the left foot. No spontaneous drainage. No crepitus. No erythema extending onto the foot dorsum. Calf is supple nontender. Patient endorses to me that his left-sided chest pressure has been ongoing for the last 3 to 4 months, constant and worse at rest with sitting at the edge of the bed. Denying any worsened chest pain with exertion or deep inspiration. No previous history of DVT/PE.     I have reviewed and interpreted all of the currently available lab results from this visit (if applicable):  Results for orders placed or performed during the hospital encounter of 09/17/21   COVID-19, Rapid    Specimen: Nasopharyngeal   Result Value Ref Range    Source THROAT     SARS-CoV-2, NAAT NOT DETECTED NOT DETECTED   CBC Auto Differential   Result Value Ref Range    WBC 8.3 4.0 - 10.5 K/CU MM    RBC 3.87 (L) 4.6 - 6.2 M/CU MM    Hemoglobin 11.3 (L) 13.5 - 18.0 GM/DL    Hematocrit 34.5 (L) 42 - 52 %    MCV 89.1 78 - 100 FL    MCH 29.2 27 - 31 PG    MCHC 32.8 32.0 - 36.0 %    RDW 12.7 11.7 - 14.9 %    Platelets 366 154 - 571 K/CU MM    MPV 10.3 7.5 - 11.1 FL    Differential Type AUTOMATED DIFFERENTIAL     Segs Relative 73.2 (H) 36 - 66 %    Lymphocytes % 14.5 (L) 24 - 44 %    Monocytes % 9.0 (H) 0 - 4 %    Eosinophils % 2.1 0 - 3 %    Basophils % 0.5 0 - 1 %    Segs Absolute 6.0 K/CU MM    Lymphocytes Absolute 1.2 K/CU MM    Monocytes Absolute 0.7 K/CU MM    Eosinophils Absolute 0.2 K/CU MM    Basophils Absolute 0.0 K/CU MM    Nucleated RBC % 0.0 %    Total Nucleated RBC 0.0 K/CU MM    Total Immature Neutrophil 0.06 K/CU MM    Immature Neutrophil % 0.7 (H) 0 - 0.43 %   Comprehensive Metabolic Panel w/ Reflex to MG   Result Value Ref Range    Sodium 135 135 - 145 MMOL/L    Potassium 4.9 3.5 - 5.1 MMOL/L    Chloride 106 99 - 110 mMol/L    CO2 16 (L) 21 - 32 MMOL/L    BUN 54 (H) 6 - 23 MG/DL    CREATININE 2.6 (H) 0.9 - 1.3 MG/DL    Glucose 160 (H) 70 - 99 MG/DL    Calcium 8.1 (L) 8.3 - 10.6 MG/DL    Albumin 2.8 (L) 3.4 - 5.0 GM/DL    Total Protein 6.0 (L) 6.4 - 8.2 GM/DL    Total Bilirubin 0.2 0.0 - 1.0 MG/DL    ALT 11 10 - 40 U/L    AST 12 (L) 15 - 37 IU/L    Alkaline Phosphatase 92 40 - 128 IU/L    GFR Non- 27 (L) >60 mL/min/1.73m2    GFR  32 (L) >60 mL/min/1.73m2    Anion Gap 13 4 - 16   Troponin   Result Value Ref Range    Troponin T 0.041 (H) <0.01 NG/ML   Brain Natriuretic Peptide   Result Value Ref Range    Pro-.1 (H) <300 PG/ML   Lactic Acid, Plasma   Result Value Ref Range    Lactate 1.2 0.4 - 2.0 mMOL/L   POCT Glucose   Result Value Ref Range    POC Glucose 195 (H) 70 - 99 MG/DL       IMAGING:  Results for orders placed or performed during the hospital encounter of 09/17/21   COVID-19, Rapid    Specimen: Nasopharyngeal   Result Value Ref Range    Source THROAT     SARS-CoV-2, NAAT NOT DETECTED NOT DETECTED   CBC Auto Differential   Result Value Ref Range    WBC 8.3 4.0 - 10.5 K/CU MM    RBC 3.87 (L) 4.6 - 6.2 M/CU MM    Hemoglobin 11.3 (L) 13.5 - 18.0 GM/DL    Hematocrit 34.5 (L) 42 - 52 %    MCV 89.1 78 - 100 FL    MCH 29.2 27 - 31 PG    MCHC 32.8 32.0 - 36.0 %    RDW 12.7 11.7 - 14.9 %    Platelets 455 596 - 019 K/CU MM    MPV 10.3 7.5 - 11.1 FL    Differential Type AUTOMATED DIFFERENTIAL     Segs Relative 73.2 (H) 36 - 66 %    Lymphocytes % 14.5 (L) 24 - 44 %    Monocytes % 9.0 (H) 0 - 4 %    Eosinophils % 2.1 0 - 3 %    Basophils % 0.5 0 - 1 %    Segs Absolute 6.0 K/CU MM    Lymphocytes Absolute 1.2 K/CU MM    Monocytes Absolute 0.7 K/CU MM    Eosinophils Absolute 0.2 K/CU MM    Basophils Absolute 0.0 K/CU MM    Nucleated RBC % 0.0 %    Total Nucleated RBC 0.0 K/CU MM    Total Immature Neutrophil 0.06 K/CU MM    Immature Neutrophil % 0.7 (H) 0 - 0.43 %   Comprehensive Metabolic Panel w/ Reflex to MG   Result Value Ref Range    Sodium 135 135 - 145 MMOL/L    Potassium 4.9 3.5 - 5.1 MMOL/L    Chloride 106 99 - 110 mMol/L    CO2 16 (L) 21 - 32 MMOL/L    BUN 54 (H) 6 - 23 MG/DL    CREATININE 2.6 (H) 0.9 - 1.3 MG/DL    Glucose 160 (H) 70 - 99 MG/DL    Calcium 8.1 (L) 8.3 - 10.6 MG/DL    Albumin 2.8 (L) 3.4 - 5.0 GM/DL    Total Protein 6.0 (L) 6.4 - 8.2 GM/DL    Total Bilirubin 0.2 0.0 - 1.0 MG/DL    ALT 11 10 - 40 U/L    AST 12 (L) 15 - 37 IU/L    Alkaline Phosphatase 92 40 - 128 IU/L    GFR Non- 27 (L) >60 mL/min/1.73m2    GFR  32 (L) >60 mL/min/1.73m2    Anion Gap 13 4 - 16   Troponin   Result Value Ref Range    Troponin T 0.041 (H) <0.01 NG/ML   Brain Natriuretic Peptide   Result Value Ref Range    Pro-.1 (H) <300 PG/ML   Lactic Acid, Plasma   Result Value Ref Range    Lactate 1.2 0.4 - 2.0 mMOL/L   POCT Glucose   Result Value Ref Range    POC Glucose 195 (H) 70 - 99 MG/DL       EKG Interpretation  Please see ED physician's note - Dr. Umu Gardner - for EKG interpretation      Final Impression:  1. Chest pain, unspecified type    2. Diabetic ulcer of left midfoot associated with type 2 diabetes mellitus, unspecified ulcer stage [facility])        Patient was signed out to me by colleague, TARUN Pulliam PA-C, pending repeat 3 hour delta troponin at 2230pm and anticipated discharge home with abx for ulcer and outpatient followup with wound care center. Please see his/her note for complete HPI/physical exam and initial interpretation of completed labs/imaging studies. Labs completed prior to me taking over patient's care were remarkable for BUN creatinine 54/2.6 which is similar to previous. Glucose is 160. CO2 is 16 but normal anion gap. Initial trop is 0.041, doubled trended upward from previous in June 2021. BNP is 913 with normal lactic acid. Rapid covid is negative. EKG was interpreted by ED attending and reveal no evidence of acute ischemic changes. Left foot xray shows plantar soft tissue ulceration without destructive bony abnormality. Chest xray clear. DVT study negative. On my exam, patient is resting comfortably. Ulcer to the left foot reveals no active drainage or developing cellulitis but would benefit from debridement. Patient states that he was seen previously by podiatry, Dr. Knight Reasons for debridement procedures of left foot ulcer however has not been able to follow-up secondary insurance issues. On chart review, it does appear patient had an abnormal stress test during last hospitalization for Irene's gangrene on 6/5/2021 and was recommended for additional cardiac evaluation once more stable and aseptic. Patient has not followed up with his cardiologist since. He does state that chest pain has been ongoing for the last several months. Given his ongoing chest pain, lab work today and recent stress test, we will plan to consult with patient's cardiologist recommendations including close outpatient follow-up versus admission for ACS observation. I did consult with Dr. Annalise Lord - cardiology - and discussed patient's history, ED presentation/course including any pertinent laboratory findings and imaging study findings.  He/she does state as patient has been has been ongoing for the last several months with

## 2021-09-18 NOTE — ED PROVIDER NOTES
EKG read only:  Normal sinus rhythm, ventricular rate 98, PA interval 156, QRS duration 78, QTc 434, no significant ST changes, overall normal EKG     Erickson Kelley MD  09/17/21 2042

## 2021-09-20 ENCOUNTER — OFFICE VISIT (OUTPATIENT)
Dept: CARDIOLOGY CLINIC | Age: 46
End: 2021-09-20
Payer: MEDICARE

## 2021-09-20 VITALS
HEART RATE: 92 BPM | DIASTOLIC BLOOD PRESSURE: 80 MMHG | HEIGHT: 69 IN | OXYGEN SATURATION: 98 % | SYSTOLIC BLOOD PRESSURE: 140 MMHG | BODY MASS INDEX: 41.35 KG/M2

## 2021-09-20 DIAGNOSIS — I10 ESSENTIAL HYPERTENSION: ICD-10-CM

## 2021-09-20 DIAGNOSIS — R07.9 CHEST PAIN, UNSPECIFIED TYPE: Primary | ICD-10-CM

## 2021-09-20 LAB
EKG ATRIAL RATE: 98 BPM
EKG DIAGNOSIS: NORMAL
EKG P AXIS: 37 DEGREES
EKG P-R INTERVAL: 156 MS
EKG Q-T INTERVAL: 340 MS
EKG QRS DURATION: 78 MS
EKG QTC CALCULATION (BAZETT): 434 MS
EKG R AXIS: 38 DEGREES
EKG T AXIS: 76 DEGREES
EKG VENTRICULAR RATE: 98 BPM

## 2021-09-20 PROCEDURE — G8427 DOCREV CUR MEDS BY ELIG CLIN: HCPCS | Performed by: INTERNAL MEDICINE

## 2021-09-20 PROCEDURE — 99204 OFFICE O/P NEW MOD 45 MIN: CPT | Performed by: INTERNAL MEDICINE

## 2021-09-20 PROCEDURE — 1111F DSCHRG MED/CURRENT MED MERGE: CPT | Performed by: INTERNAL MEDICINE

## 2021-09-20 PROCEDURE — G8417 CALC BMI ABV UP PARAM F/U: HCPCS | Performed by: INTERNAL MEDICINE

## 2021-09-20 PROCEDURE — 1036F TOBACCO NON-USER: CPT | Performed by: INTERNAL MEDICINE

## 2021-09-20 PROCEDURE — 93010 ELECTROCARDIOGRAM REPORT: CPT | Performed by: INTERNAL MEDICINE

## 2021-09-20 PROCEDURE — 93000 ELECTROCARDIOGRAM COMPLETE: CPT | Performed by: INTERNAL MEDICINE

## 2021-09-20 NOTE — PATIENT INSTRUCTIONS
**It is YOUR responsibilty to bring medication bottles and/or updated medication list to [address] [place] [address]. This will allow us to better serve you and all your healthcare needs**      Please be informed that if you contact our office outside of normal business hours the physician on call cannot help with any scheduling or rescheduling issues, procedure instruction questions or any type of medication issue. We advise you for any urgent/emergency that you go to the nearest emergency room!     PLEASE CALL OUR OFFICE DURING NORMAL BUSINESS HOURS    Monday - Friday   8 am to 5 pm    [place]: [phone]    [place]:  [phone]

## 2021-09-20 NOTE — PROGRESS NOTES
CARDIOLOGY NOTE      9/20/2021    RE: Demarcus Wren  (1975)                               TO:  Dr. Rosalia Daly MD            Guido Gr is a 55 y.o. male who was seen today for management of  cp                                    HPI:                   The patient has cardiac complaints of recurrent lt sided CP has  Abn stress    Demarcus Wren has the following history recorded in care path:  Patient Active Problem List    Diagnosis Date Noted    Recurrent major depressive disorder, in full remission ([address] 05/18/2021    Generalized anxiety disorder 05/18/2021    Right groin wound 06/09/2021    Ulcer of right groin with fat layer exposed ([address]     Syncope and collapse 06/05/2021    Necrotizing fasciitis ([address] 05/24/2021    Morbid obesity with body mass index (BMI) of 40.0 to 44.9 in adult ([address] 05/21/2021    Irene gangrene     Cellulitis, scrotum 05/13/2021    Acute epididymitis     Cellulitis of left arm 04/03/2021    Cellulitis 03/23/2021    Osteomyelitis of fourth toe of right foot ([address] 08/07/2019    Acute renal failure (ARF) ([address] 08/04/2019    Diabetic foot infection ([address] 03/21/2019    Intractable nausea and vomiting 01/11/2019    Uncontrolled type 2 diabetes mellitus ([address] 01/11/2019    Nausea and vomiting 01/11/2019    Constipation 10/07/2018    Cellulitis of left foot     Sepsis ([address] 09/15/2018    WD-Wound, surgical, infected, initial encounter 86/77/7932    Periumbilical hernia     Abscess 12/03/2017    Diabetic ulcer of left midfoot ([address] 07/18/2017    WD-Diabetic ulcer of left midfoot associated with type 2 diabetes mellitus, with fat layer exposed ([address] 07/18/2017    Osteomyelitis ([address] 05/22/2015    Bronchitis 10/15/2013    Hyperlipidemia with target LDL less than 100 07/15/2013    Essential hypertension 07/15/2013    Bilateral carpal tunnel syndrome- left worse than right 06/24/2013    DANIELA (obstructive sleep apnea) 06/20/2013    Urinary frequency 06/20/2013    Neck pain 05/16/2013    Anxiety associated with depression     Panic attack 02/01/2012    Diabetic neuropathy ([facility] 12/22/2011    Hiatal hernia 02/04/2011    Diabetes mellitus type 2, improved controlled 02/04/2011     Current Outpatient Medications   Medication Sig Dispense Refill    sulfamethoxazole-trimethoprim (BACTRIM DS) 800-160 MG per tablet Take 1 tablet by mouth 2 times daily for 7 days 14 tablet 0    cephALEXin (KEFLEX) 500 MG capsule Take 1 capsule by mouth 4 times daily for 7 days 28 capsule 0    atorvastatin (LIPITOR) 40 MG tablet Take 1 tablet by mouth nightly 30 tablet 3    metoprolol tartrate (LOPRESSOR) 25 MG tablet Take 1 tablet by mouth 2 times daily 60 tablet 3    amLODIPine (NORVASC) 10 MG tablet Take 1 tablet by mouth daily 30 tablet 3    chlorthalidone (HYGROTON) 25 MG tablet Take 1 tablet by mouth daily 30 tablet 3    sertraline (ZOLOFT) 50 MG tablet Take 2 tablets by mouth daily 30 tablet 3    aspirin 81 MG EC tablet Take 1 tablet by mouth daily 30 tablet 3    linagliptin (TRADJENTA) 5 MG tablet Take 1 tablet by mouth daily 30 tablet 5    ondansetron (ZOFRAN) 4 MG tablet Take 1 tablet by mouth every 8 hours as needed for Nausea or Vomiting 20 tablet 0    Lancets MISC 1 each by Does not apply route daily 100 each 3    rivaroxaban (XARELTO) 10 MG TABS tablet Take 1 tablet by mouth daily (with breakfast) 30 tablet 1    tamsulosin (FLOMAX) 0.4 MG capsule Take 1 capsule by mouth daily 30 capsule 3    glucose monitoring kit (FREESTYLE) monitoring kit 1 each by Does not apply route once for 1 dose. 1 kit 0     No current facility-administered medications for this visit.      Allergies: Adhesive tape, Doxycycline, and Reglan [metoclopramide]  Past Medical History:   Diagnosis Date    Abscess 2010    scrotal    Acute renal failure (ARF) ([facility] 8/4/2019    Anxiety associated with depression     Anxiety associated with depression     Back pain 7/2/2012    Chest pain 5/1/2013    Diabetic nephropathy ([address]     Diabetic neuropathy ([address] 12/22/2011    Diabetic ulcer of left midfoot associated with type 2 diabetes mellitus, with fat layer exposed ([address] 7/18/2017    Diverticulosis     C scope + Dr. Richmond Parker DM (diabetes mellitus), type 2 ([address] 2002    DR. 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 Richmond Parker Necrotizing fasciitis [facility])     Nose fracture 1988    Panic attacks     Pericarditis 2003    Hospitalized with s/p heart cath normal    Peripheral autonomic neuropathy due to diabetes mellitus ([address]     Axonal EMG- NCS, March 2011    Sebaceous cyst 9/1/2011    URI (upper respiratory infection) 2/27/2012    WD-Wound, surgical, infected, initial encounter 12/8/2017    Wrist fracture 1986     Past Surgical History:   Procedure Laterality Date   [address] 2003, 2013    Normal (Dr. Santoro Heading)    COLONOSCOPY  6/2/2011    Pandiverticulosis, Nonbleeding internal hemorrhoids, Repeat colonoscopy at age 48- Dr. Brittany Rebolledo    \"had reconstruction surgery on nose a year after the other nose surgery\"    OTHER SURGICAL HISTORY Right 05/22/2015    I & D right great toe with partial amputation    OTHER SURGICAL HISTORY  12/04/2017    inc and drainage of abcess    OR DEEP INCIS FOOT BONE INFECTN Left 9/22/2018    LEFT FOOT DEBRIDEMENT INCISION AND DRAINAGE TOP AND BOTTOM performed by Russell Stock DPM at [facility] N/A 5/15/2021    SCROTAL AND PERINEAL DEBRIDEMENT performed by Gabrielle Barr MD at [address] [place] [address] 5/16/2021    SCROTAL RE-DEBRIDEMENT  INCISION AND DRAINAGE performed by Gabrielle Barr MD at [address] [place] [address] [phone]    sebaceous cyst removal times 4     TOE AMPUTATION      right great toe    TOE AMPUTATION Right 3/23/2019    TOE AMPUTATION RIGHT 5TH TOE performed by Diane Lane DPM at [facility] TOE AMPUTATION Right 8/6/2019    TOE AMPUTATION RIGHT 4TH TOE performed by Diane Lane DPM at [address] [place] [address] UPPER GASTROINTESTINAL ENDOSCOPY  06/2/2011    Mild gastritis with moderatley severe antritis, small hiatal hernia, Dr. Yosef Hernandez N/A 1/12/2019    EGD BIOPSY performed by Rufus Cartagena MD at [facility]      As reviewed   Family History   Problem Relation Age of Onset    Cancer Mother         ?site   [place] [facility] Stroke Mother     Bleeding Prob Mother     Diabetes Father     Heart Disease Father     High Blood Pressure Father     Obesity Father     Kidney Disease Father     Diabetes Sister     High Blood Pressure Sister     Mental Illness Sister     Obesity Sister     High Blood Pressure Other     Mental Illness Other         bipolar    Other Son         cyst in ear canal    ADHD Daughter      Social History     Tobacco Use    Smoking status: Former Smoker     Years: 20.00     Quit date: 11/18/2017     Years since quitting: 3.8    Smokeless tobacco: Never Used   Substance Use Topics    Alcohol use: Yes     Comment: occ      Review of Systems:    Constitutional: Negative for diaphoresis and fatigue  Psychological:Negative for anxiety or depression  HENT: Negative for headaches, nasal congestion, sinus pain or vertigo  Eyes: Negative for visual disturbance.    Endocrine: Negative for polydipsia/polyuria  Respiratory: Negative for shortness of breath  Cardiovascular:  cpGastrointestinal: Negative for abdominal pain or heartburn  Genito-Urinary: Negative for urinary frequency/urgency  Musculoskeletal: Negative for muscle pain, muscular weakness, negative for pain in arm and leg or swelling in foot and leg  Neurological: Negative for dizziness, headaches, memory loss, numbness/tingling, visual changes, syncope  Dermatological: Negative for rash    Objective:    Vitals:    09/20/21 1557   BP: (!) 140/80   Pulse: 92   SpO2: 98%   Height: 5' 9\" (1.753 m)     BP (!) 140/80   Pulse 92   Ht 5' 9\" (1.753 m)   SpO2 98%   BMI 41.35 kg/m²     No flowsheet data found. Wt Readings from Last 3 Encounters:   09/17/21 280 lb (127 kg)   09/09/21 284 lb 12.8 oz (129.2 kg)   08/12/21 277 lb 14.4 oz (126.1 kg)     Body mass index is 41.35 kg/m². GENERAL - Alert, oriented, pleasant, in no apparent distress. EYES: No jaundice, no conjunctival pallor. SKIN: It is warm & dry. No rashes. No Echhymosis    HEENT - No clinically significant abnormalities seen. Neck - Supple. No jugular venous distention noted. No carotid bruits. Cardiovascular - Normal S1 and S2 without obvious murmur or gallop. Extremities - No cyanosis, clubbing, or significant edema. Pulmonary - No respiratory distress. No wheezes or rales. Abdomen - No masses, tenderness, or organomegaly. Musculoskeletal - No significant edema. No joint deformities. No muscle wasting. Neurologic - Cranial nerves II through XII are grossly intact. There were no gross focal neurologic abnormalities.     Lab Review   Lab Results   Component Value Date    CKTOTAL 73 08/04/2019    CKMB 2.8 05/01/2013    TROPONINT 0.041 09/17/2021     BNP:    Lab Results   Component Value Date    BNP 10 05/01/2013     PT/INR:    Lab Results   Component Value Date    INR 1.02 03/22/2019     Lab Results   Component Value Date    LABA1C 8.6 (H) 06/06/2021    LABA1C 9.4 (H) 05/25/2021     Lab Results   Component Value Date    WBC 8.3 09/17/2021    HCT 34.5 (L) 09/17/2021    MCV 89.1 09/17/2021     09/17/2021     Lab Results   Component Value Date    CHOL 145 05/13/2021    TRIG 188 (H) 05/13/2021    HDL 29 (L) 05/13/2021    LDLCALC 92 07/24/2017    LDLDIRECT 87 05/13/2021     Lab Results   Component Value Date    ALT 11 09/17/2021    AST 12 (L) 09/17/2021     BMP:    Lab Results   Component Value Date

## 2021-09-21 ENCOUNTER — NURSE ONLY (OUTPATIENT)
Dept: CARDIOLOGY CLINIC | Age: 46
End: 2021-09-21

## 2021-09-21 ENCOUNTER — HOSPITAL ENCOUNTER (OUTPATIENT)
Dept: WOUND CARE | Age: 46
Discharge: HOME | End: 2021-09-21
Payer: MEDICARE

## 2021-09-21 ENCOUNTER — HOSPITAL ENCOUNTER (OUTPATIENT)
Age: 46
Discharge: HOME | End: 2021-09-21
Payer: MEDICARE

## 2021-09-21 VITALS
DIASTOLIC BLOOD PRESSURE: 92 MMHG | TEMPERATURE: 98.7 F | HEART RATE: 84 BPM | SYSTOLIC BLOOD PRESSURE: 180 MMHG | RESPIRATION RATE: 16 BRPM

## 2021-09-21 DIAGNOSIS — R89.9 ABNORMAL LABORATORY TEST RESULT: Primary | ICD-10-CM

## 2021-09-21 DIAGNOSIS — L97.425 DIABETIC ULCER OF LEFT MIDFOOT ASSOCIATED WITH TYPE 2 DIABETES MELLITUS, WITH MUSCLE INVOLVEMENT WITHOUT EVIDENCE OF NECROSIS (HCC): Primary | ICD-10-CM

## 2021-09-21 DIAGNOSIS — L97.425 DIABETIC ULCER OF LEFT MIDFOOT ASSOCIATED WITH TYPE 2 DIABETES MELLITUS, WITH MUSCLE INVOLVEMENT WITHOUT EVIDENCE OF NECROSIS (HCC): ICD-10-CM

## 2021-09-21 DIAGNOSIS — E11.621 DIABETIC ULCER OF LEFT MIDFOOT ASSOCIATED WITH TYPE 2 DIABETES MELLITUS, WITH MUSCLE INVOLVEMENT WITHOUT EVIDENCE OF NECROSIS (HCC): Primary | ICD-10-CM

## 2021-09-21 DIAGNOSIS — R94.39 ABNORMAL NUCLEAR STRESS TEST: Primary | ICD-10-CM

## 2021-09-21 DIAGNOSIS — E11.621 DIABETIC ULCER OF LEFT MIDFOOT ASSOCIATED WITH TYPE 2 DIABETES MELLITUS, WITH MUSCLE INVOLVEMENT WITHOUT EVIDENCE OF NECROSIS (HCC): ICD-10-CM

## 2021-09-21 PROBLEM — L97.429 DIABETIC ULCER OF LEFT MIDFOOT (HCC): Status: RESOLVED | Noted: 2017-07-18 | Resolved: 2021-09-21

## 2021-09-21 PROBLEM — L97.422 DIABETIC ULCER OF LEFT MIDFOOT ASSOCIATED WITH TYPE 2 DIABETES MELLITUS, WITH FAT LAYER EXPOSED (HCC): Status: RESOLVED | Noted: 2017-07-18 | Resolved: 2021-09-21

## 2021-09-21 PROBLEM — M86.9 OSTEOMYELITIS OF FOURTH TOE OF RIGHT FOOT (HCC): Status: RESOLVED | Noted: 2019-08-07 | Resolved: 2021-09-21

## 2021-09-21 LAB
ABO/RH: NORMAL
ANION GAP SERPL CALCULATED.3IONS-SCNC: 10 MMOL/L (ref 4–16)
ANTIBODY SCREEN: NEGATIVE
BUN BLDV-MCNC: 54 MG/DL (ref 6–23)
CALCIUM SERPL-MCNC: 8.5 MG/DL (ref 8.3–10.6)
CHLORIDE BLD-SCNC: 107 MMOL/L (ref 99–110)
CO2: 20 MMOL/L (ref 21–32)
COMMENT: NORMAL
CREAT SERPL-MCNC: 2.3 MG/DL (ref 0.9–1.3)
GFR AFRICAN AMERICAN: 37 ML/MIN/1.73M2
GFR NON-AFRICAN AMERICAN: 31 ML/MIN/1.73M2
GLUCOSE BLD-MCNC: 184 MG/DL (ref 70–99)
POTASSIUM SERPL-SCNC: 5.3 MMOL/L (ref 3.5–5.1)
SODIUM BLD-SCNC: 137 MMOL/L (ref 135–145)

## 2021-09-21 PROCEDURE — 87186 SC STD MICRODIL/AGAR DIL: CPT

## 2021-09-21 PROCEDURE — 99213 OFFICE O/P EST LOW 20 MIN: CPT | Performed by: NURSE PRACTITIONER

## 2021-09-21 PROCEDURE — 99999 PR OFFICE/OUTPT VISIT,PROCEDURE ONLY: CPT | Performed by: INTERNAL MEDICINE

## 2021-09-21 PROCEDURE — 87077 CULTURE AEROBIC IDENTIFY: CPT

## 2021-09-21 PROCEDURE — 99213 OFFICE O/P EST LOW 20 MIN: CPT

## 2021-09-21 PROCEDURE — 86901 BLOOD TYPING SEROLOGIC RH(D): CPT

## 2021-09-21 PROCEDURE — 86850 RBC ANTIBODY SCREEN: CPT

## 2021-09-21 PROCEDURE — 11042 DBRDMT SUBQ TIS 1ST 20SQCM/<: CPT | Performed by: NURSE PRACTITIONER

## 2021-09-21 PROCEDURE — 11042 DBRDMT SUBQ TIS 1ST 20SQCM/<: CPT

## 2021-09-21 PROCEDURE — 87070 CULTURE OTHR SPECIMN AEROBIC: CPT

## 2021-09-21 PROCEDURE — 87075 CULTR BACTERIA EXCEPT BLOOD: CPT

## 2021-09-21 PROCEDURE — 36415 COLL VENOUS BLD VENIPUNCTURE: CPT

## 2021-09-21 PROCEDURE — 86900 BLOOD TYPING SEROLOGIC ABO: CPT

## 2021-09-21 PROCEDURE — 80048 BASIC METABOLIC PNL TOTAL CA: CPT

## 2021-09-21 RX ORDER — INSULIN GLARGINE 100 [IU]/ML
75 INJECTION, SOLUTION SUBCUTANEOUS NIGHTLY
Status: ON HOLD | COMMUNITY
End: 2022-08-03 | Stop reason: HOSPADM

## 2021-09-21 RX ORDER — GENTAMICIN SULFATE 1 MG/G
OINTMENT TOPICAL
Qty: 30 G | Refills: 1 | Status: SHIPPED | OUTPATIENT
Start: 2021-09-21 | End: 2021-09-28

## 2021-09-21 ASSESSMENT — PAIN DESCRIPTION - FREQUENCY: FREQUENCY: CONTINUOUS

## 2021-09-21 ASSESSMENT — PAIN DESCRIPTION - DESCRIPTORS: DESCRIPTORS: SHARP;STABBING

## 2021-09-21 ASSESSMENT — PAIN SCALES - GENERAL: PAINLEVEL_OUTOF10: 7

## 2021-09-21 ASSESSMENT — PAIN DESCRIPTION - PROGRESSION: CLINICAL_PROGRESSION: NOT CHANGED

## 2021-09-21 ASSESSMENT — PAIN DESCRIPTION - ORIENTATION: ORIENTATION: LEFT

## 2021-09-21 ASSESSMENT — PAIN - FUNCTIONAL ASSESSMENT: PAIN_FUNCTIONAL_ASSESSMENT: PREVENTS OR INTERFERES SOME ACTIVE ACTIVITIES AND ADLS

## 2021-09-21 ASSESSMENT — PAIN DESCRIPTION - PAIN TYPE: TYPE: ACUTE PAIN

## 2021-09-21 ASSESSMENT — PAIN DESCRIPTION - LOCATION: LOCATION: FOOT

## 2021-09-21 ASSESSMENT — PAIN DESCRIPTION - ONSET: ONSET: ON-GOING

## 2021-09-21 NOTE — PROGRESS NOTES
DEEP INCIS FOOT BONE INFECTN Left 9/22/2018    LEFT FOOT DEBRIDEMENT INCISION AND DRAINAGE TOP AND BOTTOM performed by Mina Anders DPM at [facility] N/A 5/15/2021    SCROTAL AND PERINEAL DEBRIDEMENT performed by Rusty Jameson MD at [facility] N/A 5/16/2021    SCROTAL RE-DEBRIDEMENT  INCISION AND DRAINAGE performed by Rusty Jameson MD at [address] [place] [address] [phone]    sebaceous cyst removal times 4     TOE AMPUTATION      right great toe    TOE AMPUTATION Right 3/23/2019    TOE AMPUTATION RIGHT 5TH TOE performed by Mina Anders DPM at [place] [address] TOE AMPUTATION Right 8/6/2019    TOE AMPUTATION RIGHT 4TH TOE performed by Mina Anders DPM at [address] [place] [address] UPPER GASTROINTESTINAL ENDOSCOPY  06/2/2011    Mild gastritis with moderatley severe antritis, small hiatal hernia, Dr. Lalitha Dorantes N/A 1/12/2019    EGD BIOPSY performed by Matt Hauser MD at [facility]    Family History   Problem Relation Age of Onset    Cancer Mother         ?site   [facility] Stroke Mother     Bleeding Prob Mother     Diabetes Father     Heart Disease Father     High Blood Pressure Father     Obesity Father     Kidney Disease Father     Diabetes Sister     High Blood Pressure Sister     Mental Illness Sister     Obesity Sister     High Blood Pressure Other     Mental Illness Other         bipolar    Other Son         cyst in ear canal    ADHD Daughter        SOCIAL HISTORY    Social History     Tobacco Use    Smoking status: Former Smoker     Years: 20.00     Quit date: 11/18/2017     Years since quitting: 3.8    Smokeless tobacco: Never Used   Vaping Use    Vaping Use: Never used   Substance Use Topics    Alcohol use: Yes     Comment: occ    Drug use: Yes     Frequency: 7.0 times per week     Types: Marijuana       ALLERGIES    Allergies   Allergen Reactions    Adhesive Tape Rash  Doxycycline Nausea And Vomiting    Reglan [Metoclopramide] Anxiety       MEDICATIONS    Current Outpatient Medications on File Prior to Encounter   Medication Sig Dispense Refill    insulin glargine (LANTUS) 100 UNIT/ML injection vial Inject 75 Units into the skin nightly      insulin aspart (NOVOLOG RELION) 100 UNIT/ML injection vial Inject 35 Units into the skin 3 times daily (before meals)      sulfamethoxazole-trimethoprim (BACTRIM DS) 800-160 MG per tablet Take 1 tablet by mouth 2 times daily for 7 days 14 tablet 0    cephALEXin (KEFLEX) 500 MG capsule Take 1 capsule by mouth 4 times daily for 7 days 28 capsule 0    atorvastatin (LIPITOR) 40 MG tablet Take 1 tablet by mouth nightly 30 tablet 3    metoprolol tartrate (LOPRESSOR) 25 MG tablet Take 1 tablet by mouth 2 times daily 60 tablet 3    amLODIPine (NORVASC) 10 MG tablet Take 1 tablet by mouth daily 30 tablet 3    chlorthalidone (HYGROTON) 25 MG tablet Take 1 tablet by mouth daily 30 tablet 3    sertraline (ZOLOFT) 50 MG tablet Take 2 tablets by mouth daily 30 tablet 3    aspirin 81 MG EC tablet Take 1 tablet by mouth daily 30 tablet 3    linagliptin (TRADJENTA) 5 MG tablet Take 1 tablet by mouth daily 30 tablet 5    ondansetron (ZOFRAN) 4 MG tablet Take 1 tablet by mouth every 8 hours as needed for Nausea or Vomiting 20 tablet 0    rivaroxaban (XARELTO) 10 MG TABS tablet Take 1 tablet by mouth daily (with breakfast) 30 tablet 1    Lancets MISC 1 each by Does not apply route daily 100 each 3    glucose monitoring kit (FREESTYLE) monitoring kit 1 each by Does not apply route once for 1 dose. 1 kit 0     No current facility-administered medications on file prior to encounter.        PROBLEM LIST    Patient Active Problem List   Diagnosis    Hiatal hernia    Diabetes mellitus type 2, improved controlled    [facility]    Panic attack    Anxiety associated with depression    Neck pain    DANIELA (obstructive sleep apnea)    Urinary frequency    Bilateral carpal tunnel syndrome- left worse than right    Hyperlipidemia with target LDL less than 100    Essential hypertension    Bronchitis    Osteomyelitis (HCC)    Abscess    Periumbilical hernia    Sepsis (HCC)    Cellulitis of left foot    Constipation    Intractable nausea and vomiting    Uncontrolled type 2 diabetes mellitus (HCC)    Nausea and vomiting    Diabetic foot infection ([address]    Acute renal failure (ARF) (HCC)    Cellulitis    Cellulitis of left arm    Cellulitis, scrotum    Acute epididymitis    Irene gangrene    Recurrent major depressive disorder, in full remission ([address]    Generalized anxiety disorder    Morbid obesity with body mass index (BMI) of 40.0 to 44.9 in adult [facility])    Necrotizing fasciitis (HCC)    Syncope and collapse    Right groin wound    Ulcer of right groin with fat layer exposed ([address]    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 type 2 diabetes mellitus, with muscle involvement without evidence of necrosis ([address]       REVIEW OF SYSTEMS    Pertinent items are noted in HPI. Objective:      BP (!) 180/92   Pulse 84   Temp 98.7 °F (37.1 °C) (Temporal)   Resp 16     PHYSICAL EXAM  General Appearance: alert and oriented to person, place and time, well-developed and well-nourished, in no acute distress  Pulmonary/Chest: clear to auscultation bilaterally- no wheezes, rales or rhonchi, normal air movement, no respiratory distress  Cardiovascular: normal rate, normal S1 and S2, no gallops and intact distal pulses  Dermatologic exam: Visual inspection of the periwound reveals the skin to be dry, coarse and callus  Wound exam: see wound description below in procedure note    Assessment:       Juani Camacho  appears to have a non-healing wound of the . The etiology of the wound is felt to be diabetic and pressure.  There are multiple complicating factors including diabetes, poor glucose control, chronic pressure, decreased mobility, shear force, obesity and anticoagulation therapy. A comprehensive wound management program would be helpful to heal this wound. Assessments completed include fall risk and nutritional, functional,and psychological status. At this time appropriate care would include: periodic debridement and wound care as below. Problem List Items Addressed This Visit     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 type 2 diabetes mellitus, with muscle involvement without evidence of necrosis (HCC)    Relevant Medications    insulin glargine (LANTUS) 100 UNIT/ML injection vial    insulin aspart (NOVOLOG RELION) 100 UNIT/ML injection vial        Procedure Note    Indications:  Based on my examination of this patient's wound(s) today, sharp excision into necrotic subcutaneous tissue is required to promote healing and evaluate the extent of previous healing. Performed by: APPLE Auguste CNP    Consent obtained: Yes    Time out taken:  Yes    Pain Control: Anesthetic  Anesthetic: 5% Lidocaine Ointment Topical     Debridement:Excisional Debridement    Using curette and tissue nippers the wound(s) was/were sharply debrided down through and including the removal of subcutaneous tissue.         Devitalized Tissue Debrided:  slough and exudate    Pre Debridement Measurements:  Are located in the Wound Documentation Flow Sheet    All active wounds listed below with today's date are evaluated  Wound(s)    debrided this date include # : 1     Post  Debridement Measurements:  Wound 09/21/21 Foot Left;Plantar #1 (Active)   Wound Image   09/21/21 1542   Wound Cleansed Wound cleanser 09/21/21 1542   Wound Length (cm) 5 cm 09/21/21 1542   Wound Width (cm) 2 cm 09/21/21 1542   Wound Depth (cm) 1 cm 09/21/21 1542   Wound Surface Area (cm^2) 10 cm^2 09/21/21 1542   Wound Volume (cm^3) 10 cm^3 09/21/21 1542   Post-Procedure Length (cm) 3.5 cm 09/21/21 1624   Post-Procedure Width (cm) 1.3 cm 09/21/21 1624   Post-Procedure Depth (cm) 0.7 cm 09/21/21 1624   Post-Procedure Surface Area (cm^2) 4.55 cm^2 09/21/21 1624   Post-Procedure Volume (cm^3) 3.185 cm^3 09/21/21 1624   Distance Tunneling (cm) 0 cm 09/21/21 1542   Tunneling Position ___ O'Clock 0 09/21/21 1542   Undermining Starts ___ O'Clock 0 09/21/21 1542   Undermining Ends___ O'Clock 0 09/21/21 1542   Undermining Maxium Distance (cm) 0 09/21/21 1542   Wound Assessment Eschar dry 09/21/21 1542   Drainage Amount None 09/21/21 1542   Odor None 09/21/21 1542   Shazia-wound Assessment Hyperkeratosis (callous) 09/21/21 1542   Margins Attached edges 09/21/21 1542   Number of days: 0       Percent of Wound(s) Debrided: 100%    Total  Area  Debrided:  10 sq cm     Bleeding:  Minimal    Hemostasis Achieved:  by pressure    Procedural Pain:  0  / 10     Post Procedural Pain:  0 / 10     Response to treatment:  Well tolerated by patient. The wound was cultured, will use Gent ointment to minimize bio-burden and collagen to build tissue along with compression wrap and off loading shoe. Keep skin clean and dry. Discussed local wound care and signs of infection. The patient's toe nails with  Brittleness and crumbling upon debridement, discoloration, elongation, fungus and thickening. Debrided in length and thickness,  totaling 7 nails using rongeurs. Medical necessity includes advanced atrophic changes to include decrease hair growth lower legs, thickening, discolored toenails and rubor L60.2. The primary care provider is Dr. Janis Scott,  last seen 6/5/21. Plan:     Discharge Instructions       PHYSICIAN ORDERS AND DISCHARGE INSTRUCTIONS    NOTE: Upon discharge from the [address], you will receive a patient experience survey. We would be grateful if you would take the time to fill this survey out.     Wound care order history:     BAIRON's   Right   1.38    Left  0.94   Date 9/21/2021   Vascular studies:   Date    Imaging:   Date    Cultures:   Date obtained on 9/21/2021   Grafts:  Date    Antibiotics:               Earlier Wound care treatments:    Primary care physician    Continuing wound care orders and information:              Residence:  Private               Continue [facility] health care with: [facility]     Wound Medications:    Wound cleansing:      Do not scrub or use excessive force. Wash hands with soap and water before and after dressing changes. Prior to applying a clean dressing, cleanse wound with normal saline,          wound cleanser, or mild soap and water. Ask the physician or nurse before getting the wound(s) wet in a shower   Daily Wound management:    Keep weight off wounds and reposition every 2 hours. Avoid standing for long periods of time. Apply wraps/stockings in AM and remove at bedtime. If swelling is present, elevate legs to the level of the heart or above for 30                              minutes 4-5 times a day and/or when sitting. When taking antibiotics take entire prescription as ordered by physician    do not stop taking until medicine is all gone. Orders for this week: 9/12/2021    Betadine to toenail beds on 9/21/2021        Left plantar-- wash with soap and water, pat dry. Gentamicin and stimulin powder to wound bed. Calcium alginate AG to mao wound with steri strips to hold in place and cover with ABD. Wrap with coban 2 lite. C to change on Friday may use Unna boot in place of coban 2 lite until available. [facility] on [employer]  RX:  Consult:  Central Scheduling: [phone]    Follow up with Stephania LAUGHLIN   In 1 weeks in the wound care center  Call [phone] for any questions or concerns.   Date__________   Time____________      Signature____________________________________________________        Treatment Note      Written Patient Dismissal Instructions Given          Electronically signed by APPLE Camarena CNP on 9/21/2021 at 4:36 PM

## 2021-09-21 NOTE — PROGRESS NOTES
Patient here in office & educated on [facility] for Dx: Abnormal NM, scheduled for 9/24/21 @ 9:00, w/arrival @ 7:00, @ [facility]. Risks explained; & consents signed. Pre-admission orders given to pt for labs & CXR, which are due 9/21/21 @ [facility]. Instructions given to pt to: hardik YANG after midnight the night before procedure. Patient to call hospital @ [phone] to pre-register. May take morning meds the morning of procedure. Patient was notified that procedure could be delayed due to an emergency. Patient voiced understanding. Copies of consent, pre-testing orders, & info. sheet scanned into media.

## 2021-09-21 NOTE — PROGRESS NOTES
Multilayer Compression Wrap   (Not Unna) Below the Knee    NAME:  Gurmeet Coates OF BIRTH:  1975  MEDICAL RECORD NUMBER:  [id number]  DATE:  9/21/2021    Multilayer compression wrap: Removed old Multilayer wrap if indicated and wash leg with mild soap/water. Applied moisturizing agent to dry skin as needed. Applied primary and secondary dressing as ordered. Applied multilayered dressing below the knee to left lower leg. Instructed patient/caregiver not to remove dressing and to keep it clean and dry. Instructed patient/caregiver on complications to report to provider, such as pain, numbness in toes, heavy drainage, and slippage of dressing. Instructed patient on purpose of compression dressing and on activity and exercise recommendations.       Electronically signed by Nidia Pedro RN on 9/21/2021 at 4:50 PM

## 2021-09-22 ENCOUNTER — HOSPITAL ENCOUNTER (OUTPATIENT)
Age: 46
Discharge: HOME | End: 2021-09-22
Payer: MEDICARE

## 2021-09-22 ENCOUNTER — TELEPHONE (OUTPATIENT)
Dept: CARDIOLOGY CLINIC | Age: 46
End: 2021-09-22

## 2021-09-22 DIAGNOSIS — N28.9 KIDNEY DISEASE: Primary | ICD-10-CM

## 2021-09-22 PROBLEM — E11.21 DIABETIC NEPHROPATHY ASSOCIATED WITH TYPE 2 DIABETES MELLITUS (HCC): Status: ACTIVE | Noted: 2021-09-22

## 2021-09-22 PROBLEM — N04.9 NEPHROTIC SYNDROME: Status: ACTIVE | Noted: 2021-09-22

## 2021-09-22 PROBLEM — E88.09 HYPOALBUMINEMIA: Status: ACTIVE | Noted: 2021-09-22

## 2021-09-22 PROBLEM — N18.32 STAGE 3B CHRONIC KIDNEY DISEASE (HCC): Status: ACTIVE | Noted: 2021-09-22

## 2021-09-22 PROBLEM — R60.1 GENERALIZED EDEMA: Status: ACTIVE | Noted: 2021-09-22

## 2021-09-22 LAB
ALBUMIN SERPL-MCNC: 3 GM/DL (ref 3.4–5)
ANION GAP SERPL CALCULATED.3IONS-SCNC: 11 MMOL/L (ref 4–16)
BUN BLDV-MCNC: 59 MG/DL (ref 6–23)
CALCIUM SERPL-MCNC: 8.1 MG/DL (ref 8.3–10.6)
CHLORIDE BLD-SCNC: 104 MMOL/L (ref 99–110)
CO2: 20 MMOL/L (ref 21–32)
CREAT SERPL-MCNC: 2.7 MG/DL (ref 0.9–1.3)
GFR AFRICAN AMERICAN: 31 ML/MIN/1.73M2
GFR NON-AFRICAN AMERICAN: 26 ML/MIN/1.73M2
GLUCOSE BLD-MCNC: 249 MG/DL (ref 70–99)
PHOSPHORUS: 4.5 MG/DL (ref 2.5–4.9)
POTASSIUM SERPL-SCNC: 5.5 MMOL/L (ref 3.5–5.1)
SODIUM BLD-SCNC: 135 MMOL/L (ref 135–145)

## 2021-09-22 PROCEDURE — 86038 ANTINUCLEAR ANTIBODIES: CPT

## 2021-09-22 PROCEDURE — 83516 IMMUNOASSAY NONANTIBODY: CPT

## 2021-09-22 PROCEDURE — 86160 COMPLEMENT ANTIGEN: CPT

## 2021-09-22 PROCEDURE — 36415 COLL VENOUS BLD VENIPUNCTURE: CPT

## 2021-09-22 PROCEDURE — 83970 ASSAY OF PARATHORMONE: CPT

## 2021-09-22 PROCEDURE — 80069 RENAL FUNCTION PANEL: CPT

## 2021-09-22 PROCEDURE — 82570 ASSAY OF URINE CREATININE: CPT

## 2021-09-22 PROCEDURE — 84156 ASSAY OF PROTEIN URINE: CPT

## 2021-09-22 NOTE — TELEPHONE ENCOUNTER
The patient carries [employer] and the provider ordered a [address] on him . The C has been submitted to [employer] but the case is pending and waiting for review . All documents were uploaded to [facility] and the case number is [id number] .

## 2021-09-22 NOTE — TELEPHONE ENCOUNTER
[facility] to have pt directly admitted on 9/29/21 as Dr. Bryan Field ordered. He is to see Dr. John Grimm that day then have [facility] on 9/30/21.

## 2021-09-22 NOTE — TELEPHONE ENCOUNTER
Left message for pt to call we need to reschedule his LHC for 9/30/21. He will need to be direct admit 9-29-21 for Dr. Thiago Terrazas to see.

## 2021-09-24 LAB
CREATININE URINE: 144.6 MG/DL (ref 39–259)
PROT/CREAT RATIO, UR: ABNORMAL
URINE TOTAL PROTEIN: >600 MG/DL

## 2021-09-25 LAB
ANTI-NUCLEAR ANTIBODY (ANA): NORMAL
COMPLEMENT C3: 139 MG/DL (ref 88–201)
COMPLEMENT C4: 25 MG/DL (ref 10–40)
MYELOPEROXIDASE AB: 1 AU/ML (ref 0–19)
PARATHYROID HORMONE INTACT: 94 PG/ML (ref 15–65)
SERINE PR3 (ANCA): 3 AU/ML (ref 0–19)

## 2021-09-27 LAB
CULTURE: ABNORMAL
CULTURE: ABNORMAL
Lab: ABNORMAL
SPECIMEN: ABNORMAL

## 2021-09-28 ENCOUNTER — HOSPITAL ENCOUNTER (OUTPATIENT)
Dept: WOUND CARE | Age: 46
Discharge: HOME | DRG: 247 | End: 2021-09-28
Payer: MEDICARE

## 2021-09-28 VITALS
DIASTOLIC BLOOD PRESSURE: 78 MMHG | SYSTOLIC BLOOD PRESSURE: 120 MMHG | TEMPERATURE: 99.3 F | HEART RATE: 88 BPM | RESPIRATION RATE: 18 BRPM

## 2021-09-28 DIAGNOSIS — L97.425 DIABETIC ULCER OF LEFT MIDFOOT ASSOCIATED WITH TYPE 2 DIABETES MELLITUS, WITH MUSCLE INVOLVEMENT WITHOUT EVIDENCE OF NECROSIS (HCC): Primary | ICD-10-CM

## 2021-09-28 DIAGNOSIS — E11.621 DIABETIC ULCER OF LEFT MIDFOOT ASSOCIATED WITH TYPE 2 DIABETES MELLITUS, WITH MUSCLE INVOLVEMENT WITHOUT EVIDENCE OF NECROSIS (HCC): Primary | ICD-10-CM

## 2021-09-28 PROBLEM — N18.4 CHRONIC KIDNEY DISEASE, STAGE IV (SEVERE) (HCC): Status: ACTIVE | Noted: 2021-09-28

## 2021-09-28 PROCEDURE — 11042 DBRDMT SUBQ TIS 1ST 20SQCM/<: CPT

## 2021-09-28 PROCEDURE — 11042 DBRDMT SUBQ TIS 1ST 20SQCM/<: CPT | Performed by: NURSE PRACTITIONER

## 2021-09-28 ASSESSMENT — PAIN SCALES - GENERAL: PAINLEVEL_OUTOF10: 7

## 2021-09-28 ASSESSMENT — PAIN DESCRIPTION - DESCRIPTORS: DESCRIPTORS: STABBING;BURNING

## 2021-09-28 ASSESSMENT — PAIN DESCRIPTION - PAIN TYPE: TYPE: ACUTE PAIN

## 2021-09-28 ASSESSMENT — PAIN DESCRIPTION - ORIENTATION: ORIENTATION: LEFT

## 2021-09-28 ASSESSMENT — PAIN DESCRIPTION - ONSET: ONSET: ON-GOING

## 2021-09-28 ASSESSMENT — PAIN DESCRIPTION - FREQUENCY: FREQUENCY: CONTINUOUS

## 2021-09-28 ASSESSMENT — PAIN DESCRIPTION - PROGRESSION: CLINICAL_PROGRESSION: NOT CHANGED

## 2021-09-28 ASSESSMENT — PAIN DESCRIPTION - LOCATION: LOCATION: FOOT

## 2021-09-28 NOTE — PROGRESS NOTES
Multilayer Compression Wrap   (Not Unna) Below the Knee    NAME:  Eric Diaz OF BIRTH:  1975  MEDICAL RECORD NUMBER:  [id number]  DATE:  9/28/2021    Multilayer compression wrap: Removed old Multilayer wrap if indicated and wash leg with mild soap/water. Applied moisturizing agent to dry skin as needed. Applied primary and secondary dressing as ordered. Applied multilayered dressing below the knee to right lower leg. Applied multilayered dressing below the knee to left lower leg. Instructed patient/caregiver not to remove dressing and to keep it clean and dry. Instructed patient/caregiver on complications to report to provider, such as pain, numbness in toes, heavy drainage, and slippage of dressing. Instructed patient on purpose of compression dressing and on activity and exercise recommendations.       Electronically signed by Billie Briseno RN on 9/28/2021 at 4:22 PM

## 2021-09-28 NOTE — PROGRESS NOTES
Wound Care Center Progress Note With Procedure    Sultana Mansfield  AGE: 55 y.o. GENDER: male  : 1975  EPISODE DATE:  2021     Subjective:     Chief Complaint   Patient presents with    Wound Check     LLE         HISTORY of PRESENT ILLNESS     Sultana Mansfield is a 55 y.o. male who presents to the [address] [place] [address] for evaluation and treatment of Chronic diabetic and pressure ulcer of  Left plantar foot. The condition is of moderate severity. The ulcer has been present for approximately 2 years. The underlying cause is thought to be diabetes and pressure. The patients care to date has included routine care with debridement per Dr. Jaylyn Umana. His insurance has recently changed and hasn't seen Dr. Jaylyn Umana for 2 months. He was evaluated at the [facility] ED 21 with chest pain and diabetic left foot ulcer. He was started on Bactrim and Keflex through 21. Venous ultrasound negative for DVT, left foot Xray demonstrated no destructive bony abnormality identified. The patient has significant underlying medical conditions as below.      Wound Pain Timing/Severity: none  Quality of pain: N/A  Severity of pain:  0 / 10   Modifying Factors: diabetes, poor glucose control, chronic pressure, decreased mobility, shear force, obesity and anticoagulation therapy  Associated Signs/Symptoms: edema, erythema, drainage and odor     Diabetes: Yes, on Trulicity and insulin regimen, last A1c 8.6 21  Smoking:No, former smoker  Obesity: Yes  Anticoagulant therapy: Xarelto and aspirin  Immunosuppression:No  Other History: Scheduled for cardiac cath this week. 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  Recent history 21 of necrotizing fascitis groin.     Patient educated on the 6 essential components necessary for wound healing: Circulation, Debridements, Proper Dressings and Topical Wound Products, Infection Control, Edema Control and Offloading.     Patient educated on those factors that negatively effect or impact wound healing: smoking, obesity, uncontrolled diabetes, anticoagulant and immunosuppressive regimens, inadequate nutrition, untreated arterial and venous disease if applicable and measures to manage edema. PAST MEDICAL HISTORY        Diagnosis Date    Abscess 2010    scrotal    Acute renal failure (ARF) ([address] 8/4/2019    Anxiety associated with depression     Anxiety associated with depression     Back pain 7/2/2012    Chest pain 5/1/2013    Diabetic nephropathy ([address]     Diabetic neuropathy ([address] 12/22/2011    Diabetic ulcer of left midfoot associated with type 2 diabetes mellitus, with fat layer exposed ([address] 7/18/2017    Diverticulosis     C scope + Dr. Amrit Parrish DM (diabetes mellitus), type 2 ([address] 2002    DR. 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 Amrit Parrish Necrotizing fasciitis [facility])     Nose fracture 1988    Panic attacks     Pericarditis 2003    Hospitalized with s/p heart cath normal    Peripheral autonomic neuropathy due to diabetes mellitus ([address]     Axonal EMG- N[facility], March 2011    Sebaceous cyst 9/1/2011    URI (upper respiratory infection) 2/27/2012    WD-Wound, surgical, infected, initial encounter 12/8/2017    Wrist fracture 1986       PAST SURGICAL HISTORY    Past Surgical History:   Procedure Laterality Date   [address] 2003, 2013    Normal (Dr. James Quinteros)    COLONOSCOPY  6/2/2011    Pandiverticulosis, Nonbleeding internal hemorrhoids, Repeat colonoscopy at age 48- Dr. Milagros Cm    \"had reconstruction surgery on nose a year after the other nose surgery\"    OTHER SURGICAL HISTORY Right 05/22/2015    I & D right great toe with partial amputation    OTHER SURGICAL HISTORY  12/04/2017    inc and drainage of abcess    MA DEEP INCIS FOOT BONE INFECTN Left 9/22/2018    LEFT FOOT DEBRIDEMENT INCISION AND DRAINAGE TOP AND BOTTOM performed by Galina Bunn DPM at [facility] N/A 5/15/2021    SCROTAL AND PERINEAL DEBRIDEMENT performed by Olga Armenta MD at [facility]/A 5/16/2021    SCROTAL RE-DEBRIDEMENT  INCISION AND DRAINAGE performed by Olga Armenta MD at [address] [place] [address] [phone]    sebaceous cyst removal times 4     TOE AMPUTATION      right great toe    TOE AMPUTATION Right 3/23/2019    TOE AMPUTATION RIGHT 5TH TOE performed by Galina Bunn DPM at [facility] TOE AMPUTATION Right 8/6/2019    TOE AMPUTATION RIGHT 4TH TOE performed by Galina Bunn DPM at [address] [place] [address] UPPER GASTROINTESTINAL ENDOSCOPY  06/2/2011    Mild gastritis with moderatley severe antritis, small hiatal hernia, Dr. Kervin Singh 1/12/2019    EGD BIOPSY performed by Leticia Narvaez MD at [place] [address] History   Problem Relation Age of Onset   Live Chuck Cancer Mother         ?site   Live Chuck Stroke Mother     Bleeding Prob Mother     Diabetes Father     Heart Disease Father     High Blood Pressure Father     Obesity Father     Kidney Disease Father     Diabetes Sister     High Blood Pressure Sister     Mental Illness Sister     Obesity Sister     High Blood Pressure Other     Mental Illness Other         bipolar    Other Son         cyst in ear canal    ADHD Daughter        SOCIAL HISTORY    Social History     Tobacco Use    Smoking status: Former Smoker     Years: 20.00     Quit date: 11/18/2017     Years since quitting: 3.8    Smokeless tobacco: Never Used   Vaping Use    Vaping Use: Never used   Substance Use Topics    Alcohol use: Yes     Comment: occ    Drug use: Yes     Frequency: 7.0 times per week     Types: Marijuana       ALLERGIES    Allergies   Allergen Reactions    Adhesive Tape Rash    Doxycycline Nausea And Vomiting    Reglan [Metoclopramide] Anxiety       MEDICATIONS    Current Outpatient Medications on File Prior to Encounter   Medication Sig Dispense Refill    furosemide (LASIX) 40 MG tablet Take 1 tablet by mouth daily 30 tablet 1    insulin glargine (LANTUS) 100 UNIT/ML injection vial Inject 75 Units into the skin nightly      insulin aspart (NOVOLOG RELION) 100 UNIT/ML injection vial Inject 35 Units into the skin 3 times daily (before meals)      gentamicin (GARAMYCIN) 0.1 % ointment Apply topically with each dressing change 30 g 1    atorvastatin (LIPITOR) 40 MG tablet Take 1 tablet by mouth nightly 30 tablet 3    metoprolol tartrate (LOPRESSOR) 25 MG tablet Take 1 tablet by mouth 2 times daily 60 tablet 3    amLODIPine (NORVASC) 10 MG tablet Take 1 tablet by mouth daily 30 tablet 3    chlorthalidone (HYGROTON) 25 MG tablet Take 1 tablet by mouth daily 30 tablet 3    sertraline (ZOLOFT) 50 MG tablet Take 2 tablets by mouth daily 30 tablet 3    aspirin 81 MG EC tablet Take 1 tablet by mouth daily 30 tablet 3    linagliptin (TRADJENTA) 5 MG tablet Take 1 tablet by mouth daily 30 tablet 5    ondansetron (ZOFRAN) 4 MG tablet Take 1 tablet by mouth every 8 hours as needed for Nausea or Vomiting 20 tablet 0    Lancets MISC 1 each by Does not apply route daily 100 each 3    rivaroxaban (XARELTO) 10 MG TABS tablet Take 1 tablet by mouth daily (with breakfast) 30 tablet 1    glucose monitoring kit (FREESTYLE) monitoring kit 1 each by Does not apply route once for 1 dose. 1 kit 0     No current facility-administered medications on file prior to encounter. REVIEW OF SYSTEMS    Pertinent items are noted in HPI. Constitutional: Negative for systemic symptoms including fever, chills and malaise. Objective:      /78   Pulse 88   Temp 99.3 °F (37.4 °C) (Temporal)   Resp 18     PHYSICAL EXAM    General: The patient is in no acute distress.     Mental status:  Patient is appropriate, is oriented to place and plan of care. Dermatologic exam: Visual inspection of the periwound reveals the skin to be coarse and callus  Wound exam: see wound description below in procedure note    Assessment:     Problem List Items Addressed This Visit     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 type 2 diabetes mellitus, with muscle involvement without evidence of necrosis ([facility] - Primary        Procedure Note    Indications:  Based on my examination of this patient's wound(s) today, sharp excision into necrotic subcutaneous tissue is required to promote healing and evaluate the extent of previous healing. Performed by: APPLE Alicea - CNP    Consent obtained: Yes    Time out taken:  Yes    Pain Control: Anesthetic  Anesthetic: 5% Lidocaine Ointment Topical     Debridement:Excisional Debridement    Using curette the wound(s) was/were sharply debrided down through and including the removal of subcutaneous tissue.         Devitalized Tissue Debrided:  slough, exudate and callus    Pre Debridement Measurements:  Are located in the Wound Documentation Flow Sheet    All active wounds listed below with today's date are evaluated  Wound(s)    debrided this date include # : 1     Post  Debridement Measurements:  Wound 09/21/21 Foot Left;Plantar #1 (Active)   Wound Image   09/21/21 1542   Wound Etiology Pressure Stage  2 09/28/21 1530   Dressing Status New dressing applied 09/21/21 1650   Wound Cleansed Soap and water 09/28/21 1530   Offloading for Diabetic Foot Ulcers Forefoot offloading shoe 09/28/21 1530   Wound Length (cm) 2.2 cm 09/28/21 1530   Wound Width (cm) 0.9 cm 09/28/21 1530   Wound Depth (cm) 0.8 cm 09/28/21 1530   Wound Surface Area (cm^2) 1.98 cm^2 09/28/21 1530   Change in Wound Size % (l*w) 80.2 09/28/21 1530   Wound Volume (cm^3) 1.584 cm^3 09/28/21 1530   Wound Healing % 84 09/28/21 1530   Post-Procedure Length (cm) 2.2 cm 09/28/21 1550   Post-Procedure Width (cm) 0.9 cm 09/28/21 1550   Post-Procedure Depth (cm) 0.8 cm 09/28/21 1550   Post-Procedure Surface Area (cm^2) 1.98 cm^2 09/28/21 1550   Post-Procedure Volume (cm^3) 1.584 cm^3 09/28/21 1550   Distance Tunneling (cm) 0 cm 09/28/21 1530   Tunneling Position ___ O'Clock 0 09/28/21 1530   Undermining Starts ___ O'Clock 0 09/28/21 1530   Undermining Ends___ O'Clock 0 09/28/21 1530   Undermining Maxium Distance (cm) 0 09/28/21 1530   Wound Assessment Eschar dry 09/28/21 1530   Drainage Amount None 09/28/21 1530   Odor None 09/28/21 1530   Shazia-wound Assessment Hyperkeratosis (callous) 09/28/21 1530   Margins Attached edges 09/28/21 1530   Number of days: 7       Percent of Wound(s) Debrided: approximately 100%    Total  Area  Debrided:  1.98 sq cm     Bleeding:  Minimal    Hemostasis Achieved:  by pressure    Procedural Pain:  0  / 10     Post Procedural Pain:  0 / 10     Response to treatment:  Well tolerated by patient. Status of wound progress and description from last visit: The wound has greatly improved in size and apperarance. The wound culture grew heavy enterobacter which is sensitive to Karen which is already being used in his regimen. Continue current treatment plan and follow up in one week. Plan:       Discharge Instructions         Discharge Instructions        PHYSICIAN ORDERS AND DISCHARGE INSTRUCTIONS     NOTE: Upon discharge from the [address], you will receive a patient experience survey.  We would be grateful if you would take the time to fill this survey out.     Wound care order history:                 BAIRON's   Right   1.38    Left  0.94           Date 9/21/2021              Vascular studies:   Date               Imaging:   Date               Cultures:   Date obtained on 9/21/2021              Grafts:  Date               Antibiotics:               Earlier Wound care treatments:                          Primary care physician     Continuing wound care orders and information:              Residence:  Private Continue home health care with: [address]     Wound Medications:              Wound cleansing:                           Do not scrub or use excessive force. Wash hands with soap and water before and after dressing changes. Prior to applying a clean dressing, cleanse wound with normal saline,                                wound cleanser, or mild soap and water. Ask the physician or nurse before getting the wound(s) wet in a shower              Daily Wound management:                          Keep weight off wounds and reposition every 2 hours. Avoid standing for long periods of time. Apply wraps/stockings in AM and remove at bedtime. If swelling is present, elevate legs to the level of the heart or above for 30                              minutes 4-5 times a day and/or when sitting. When taking antibiotics take entire prescription as ordered by physician                             do not stop taking until medicine is all gone.                                                       Orders for this week: 2021                        Left plantar-- wash with soap and water, pat dry. Foam to medial leg. Gentamicin and stimulin powder to wound bed. Calcium alginate AG to mao wound with steri strips to hold in place and cover with ABD. Wrap with coban 2 lite.     [facility] to change on Friday may use Unna boot in place of coban 2 lite until available.      [facility] [employer]  RX:  Consult:  Central [phone]     Follow up with Stephania LAUGHLIN   In 1 weeks in the wound care center  Call [phone] for any questions or concerns.   Date__________   Time____________        Signature____________________________________________________             Treatment Note Wound 21 Foot Left;Plantar #1-Dressing/Treatment:  (Gentamicin, stimulen, ag ca alg, ABD, Coban 2 Lite)    Written Patient Dismissal Instructions Given            Electronically signed by APPLE Mcnulty CNP on 9/28/2021 at 4:35 PM

## 2021-09-29 ENCOUNTER — HOSPITAL ENCOUNTER (INPATIENT)
Age: 46
LOS: 1 days | Discharge: HOME | DRG: 247 | End: 2021-09-29
Attending: INTERNAL MEDICINE | Admitting: INTERNAL MEDICINE
Payer: MEDICARE

## 2021-09-29 VITALS
SYSTOLIC BLOOD PRESSURE: 150 MMHG | TEMPERATURE: 96.5 F | DIASTOLIC BLOOD PRESSURE: 77 MMHG | BODY MASS INDEX: 42.78 KG/M2 | WEIGHT: 289.68 LBS | OXYGEN SATURATION: 97 %

## 2021-09-29 PROBLEM — Z82.49 FH: CAD (CORONARY ARTERY DISEASE): Status: ACTIVE | Noted: 2021-09-29

## 2021-09-29 PROBLEM — I25.10 ASCVD (ARTERIOSCLEROTIC CARDIOVASCULAR DISEASE): Status: ACTIVE | Noted: 2021-09-29

## 2021-09-29 LAB — GLUCOSE BLD-MCNC: 207 MG/DL (ref 70–99)

## 2021-09-29 PROCEDURE — 1200000000 HC SEMI PRIVATE

## 2021-09-29 PROCEDURE — 0JBQ0ZZ EXCISION OF RIGHT FOOT SUBCUTANEOUS TISSUE AND FASCIA, OPEN APPROACH: ICD-10-PCS | Performed by: PODIATRIST

## 2021-09-29 PROCEDURE — 82962 GLUCOSE BLOOD TEST: CPT

## 2021-09-29 PROCEDURE — B2141ZZ FLUOROSCOPY OF RIGHT HEART USING LOW OSMOLAR CONTRAST: ICD-10-PCS | Performed by: INTERNAL MEDICINE

## 2021-09-29 PROCEDURE — 2140000000 HC CCU INTERMEDIATE R&B

## 2021-09-29 PROCEDURE — B2111ZZ FLUOROSCOPY OF MULTIPLE CORONARY ARTERIES USING LOW OSMOLAR CONTRAST: ICD-10-PCS | Performed by: INTERNAL MEDICINE

## 2021-09-29 PROCEDURE — 027034Z DILATION OF CORONARY ARTERY, ONE ARTERY WITH DRUG-ELUTING INTRALUMINAL DEVICE, PERCUTANEOUS APPROACH: ICD-10-PCS | Performed by: INTERNAL MEDICINE

## 2021-09-29 PROCEDURE — 4A023N7 MEASUREMENT OF CARDIAC SAMPLING AND PRESSURE, LEFT HEART, PERCUTANEOUS APPROACH: ICD-10-PCS | Performed by: INTERNAL MEDICINE

## 2021-09-30 ENCOUNTER — APPOINTMENT (OUTPATIENT)
Dept: GENERAL RADIOLOGY | Age: 46
DRG: 247 | End: 2021-09-30
Attending: INTERNAL MEDICINE
Payer: MEDICARE

## 2021-09-30 PROCEDURE — 71046 X-RAY EXAM CHEST 2 VIEWS: CPT

## 2021-09-30 NOTE — PROGRESS NOTES
Dr Shweta De La Rosa notified this nurse that he was unaware of Pt being admitted and he is not the admitting provider. Notified House sup of situation. Dr Joshua Tierney then notified d/t Pt stating he was told to come tonight for scheduled heart cath in am. Dr Joshua Tierney stated he would place admitting orders and talk with Dr Shweta De La Rosa and Hospitalist group in AM regarding situation.

## 2021-09-30 NOTE — PROGRESS NOTES
Notified by Dr Percy Jaime that pt PCP is Dr Belen Walters. Dr Belen Walters notified of Pt being here and needing admitting orders.

## 2021-10-05 ENCOUNTER — HOSPITAL ENCOUNTER (OUTPATIENT)
Dept: WOUND CARE | Age: 46
Discharge: HOME | End: 2021-10-05
Payer: MEDICARE

## 2021-10-05 VITALS — RESPIRATION RATE: 18 BRPM

## 2021-10-05 DIAGNOSIS — E11.621 DIABETIC ULCER OF LEFT MIDFOOT ASSOCIATED WITH TYPE 2 DIABETES MELLITUS, WITH MUSCLE INVOLVEMENT WITHOUT EVIDENCE OF NECROSIS (HCC): Primary | ICD-10-CM

## 2021-10-05 DIAGNOSIS — L97.425 DIABETIC ULCER OF LEFT MIDFOOT ASSOCIATED WITH TYPE 2 DIABETES MELLITUS, WITH MUSCLE INVOLVEMENT WITHOUT EVIDENCE OF NECROSIS (HCC): Primary | ICD-10-CM

## 2021-10-05 PROCEDURE — 11042 DBRDMT SUBQ TIS 1ST 20SQCM/<: CPT

## 2021-10-05 PROCEDURE — 11042 DBRDMT SUBQ TIS 1ST 20SQCM/<: CPT | Performed by: NURSE PRACTITIONER

## 2021-10-05 ASSESSMENT — PAIN DESCRIPTION - DESCRIPTORS: DESCRIPTORS: THROBBING

## 2021-10-05 ASSESSMENT — PAIN DESCRIPTION - ONSET: ONSET: ON-GOING

## 2021-10-05 ASSESSMENT — PAIN DESCRIPTION - ORIENTATION: ORIENTATION: LEFT

## 2021-10-05 ASSESSMENT — PAIN DESCRIPTION - FREQUENCY: FREQUENCY: INTERMITTENT

## 2021-10-05 ASSESSMENT — PAIN DESCRIPTION - PROGRESSION: CLINICAL_PROGRESSION: NOT CHANGED

## 2021-10-05 ASSESSMENT — PAIN DESCRIPTION - LOCATION: LOCATION: FOOT

## 2021-10-05 ASSESSMENT — PAIN SCALES - GENERAL: PAINLEVEL_OUTOF10: 6

## 2021-10-05 ASSESSMENT — PAIN DESCRIPTION - PAIN TYPE: TYPE: ACUTE PAIN

## 2021-10-05 NOTE — PROGRESS NOTES
Multilayer Compression Wrap   (Not Unna) Below the Knee    NAME:  Joyce Justice OF BIRTH:  1975  MEDICAL RECORD NUMBER:  [id number]  DATE:  10/5/2021    Multilayer compression wrap: Removed old Multilayer wrap if indicated and wash leg with mild soap/water. Applied moisturizing agent to dry skin as needed. Applied primary and secondary dressing as ordered. Applied multilayered dressing below the knee to left lower leg. Instructed patient/caregiver not to remove dressing and to keep it clean and dry. Instructed patient/caregiver on complications to report to provider, such as pain, numbness in toes, heavy drainage, and slippage of dressing. Instructed patient on purpose of compression dressing and on activity and exercise recommendations.       Electronically signed by Mariana Conrad LPN on 71/1/5900 at 7:49 PM

## 2021-10-06 ENCOUNTER — HOSPITAL ENCOUNTER (OUTPATIENT)
Age: 46
Discharge: HOME | End: 2021-10-06
Payer: MEDICARE

## 2021-10-06 DIAGNOSIS — N17.9 ACUTE RENAL FAILURE, UNSPECIFIED ACUTE RENAL FAILURE TYPE (HCC): ICD-10-CM

## 2021-10-06 DIAGNOSIS — E11.21 DIABETIC NEPHROPATHY ASSOCIATED WITH TYPE 2 DIABETES MELLITUS (HCC): ICD-10-CM

## 2021-10-06 DIAGNOSIS — N18.32 STAGE 3B CHRONIC KIDNEY DISEASE (HCC): ICD-10-CM

## 2021-10-06 LAB
ALBUMIN SERPL-MCNC: 3.1 GM/DL (ref 3.4–5)
ANION GAP SERPL CALCULATED.3IONS-SCNC: 8 MMOL/L (ref 4–16)
BUN BLDV-MCNC: 61 MG/DL (ref 6–23)
CALCIUM SERPL-MCNC: 8.2 MG/DL (ref 8.3–10.6)
CHLORIDE BLD-SCNC: 112 MMOL/L (ref 99–110)
CO2: 20 MMOL/L (ref 21–32)
CREAT SERPL-MCNC: 2.4 MG/DL (ref 0.9–1.3)
GFR AFRICAN AMERICAN: 35 ML/MIN/1.73M2
GFR NON-AFRICAN AMERICAN: 29 ML/MIN/1.73M2
GLUCOSE BLD-MCNC: 135 MG/DL (ref 70–99)
PHOSPHORUS: 5 MG/DL (ref 2.5–4.9)
POTASSIUM SERPL-SCNC: 5.3 MMOL/L (ref 3.5–5.1)
SODIUM BLD-SCNC: 140 MMOL/L (ref 135–145)

## 2021-10-06 PROCEDURE — 36415 COLL VENOUS BLD VENIPUNCTURE: CPT

## 2021-10-06 PROCEDURE — 80069 RENAL FUNCTION PANEL: CPT

## 2021-10-06 RX ORDER — BETAMETHASONE DIPROPIONATE 0.05 %
OINTMENT (GRAM) TOPICAL ONCE
Status: CANCELLED | OUTPATIENT
Start: 2021-10-06 | End: 2021-10-06

## 2021-10-06 RX ORDER — BACITRACIN, NEOMYCIN, POLYMYXIN B 400; 3.5; 5 [USP'U]/G; MG/G; [USP'U]/G
OINTMENT TOPICAL ONCE
Status: CANCELLED | OUTPATIENT
Start: 2021-10-06 | End: 2021-10-06

## 2021-10-06 RX ORDER — LIDOCAINE 40 MG/G
CREAM TOPICAL ONCE
Status: CANCELLED | OUTPATIENT
Start: 2021-10-06 | End: 2021-10-06

## 2021-10-06 RX ORDER — BACITRACIN ZINC AND POLYMYXIN B SULFATE 500; 1000 [USP'U]/G; [USP'U]/G
OINTMENT TOPICAL ONCE
Status: CANCELLED | OUTPATIENT
Start: 2021-10-06 | End: 2021-10-06

## 2021-10-06 RX ORDER — LIDOCAINE 50 MG/G
OINTMENT TOPICAL ONCE
Status: CANCELLED | OUTPATIENT
Start: 2021-10-06 | End: 2021-10-06

## 2021-10-06 RX ORDER — LIDOCAINE HYDROCHLORIDE 40 MG/ML
SOLUTION TOPICAL ONCE
Status: CANCELLED | OUTPATIENT
Start: 2021-10-06 | End: 2021-10-06

## 2021-10-06 RX ORDER — GENTAMICIN SULFATE 1 MG/G
OINTMENT TOPICAL ONCE
Status: CANCELLED | OUTPATIENT
Start: 2021-10-06 | End: 2021-10-06

## 2021-10-06 RX ORDER — CLOBETASOL PROPIONATE 0.5 MG/G
OINTMENT TOPICAL ONCE
Status: CANCELLED | OUTPATIENT
Start: 2021-10-06 | End: 2021-10-06

## 2021-10-06 RX ORDER — GINSENG 100 MG
CAPSULE ORAL ONCE
Status: CANCELLED | OUTPATIENT
Start: 2021-10-06 | End: 2021-10-06

## 2021-10-06 NOTE — PROGRESS NOTES
Wound Care Center Progress Note With Procedure    Norvel Cheadle  AGE: 55 y.o. GENDER: male  : 1975  EPISODE DATE:  10/5/2021     Subjective:     Chief Complaint   Patient presents with    Wound Check     left foot          HISTORY of PRESENT ILLNESS      Deann Mallory a 55 y. o. 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  The condition is of moderate severity. The ulcer has been present for approximately 2 years. Anu Carson underlying cause is thought to be diabetes and pressure. The patients care to date has included routine care with debridement per Dr. Rand Chandler. His insurance has recently changed and hasn't seen Dr. Rand Chandler for 2 months. He was evaluated at the [facility] 21 with chest pain and diabetic left foot ulcer. Maxwell Gandara was started on Bactrim and Keflex through 21. Venous ultrasound negative for DVT, left foot Xray demonstrated no destructive bony abnormality identified.  The patient has significant underlying medical conditions as below.      Wound Pain Timing/Severity: none  Quality of pain: N/A  Severity of pain:  0 / 10   Modifying Factors: diabetes, poor glucose control, chronic pressure, decreased mobility, shear force, obesity and anticoagulation therapy  Associated Signs/Symptoms: edema, erythema, drainage and odor     Diabetes: Yes, on Trulicity and insulin regimen, last A1c 8.6 21  Smoking:No, former smoker  Obesity: Yes  Anticoagulant therapy: Xarelto and aspirin  Immunosuppression:No  Other History: Scheduled for cardiac cath this week. 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  Recent history 21 of necrotizing fascitis groin.     Patient educated on the 6 essential components necessary for wound healing: Circulation, Debridements, Proper Dressings and Topical Wound Products, Infection Control, Edema Control and Offloading.     Patient educated on those factors that negatively effect or impact wound healing: smoking, obesity, uncontrolled diabetes, anticoagulant and immunosuppressive regimens, inadequate nutrition, untreated arterial and venous disease if applicable and measures to manage edema. PAST MEDICAL HISTORY        Diagnosis Date    Abscess 2010    scrotal    Acute renal failure (ARF) ([address] 8/4/2019    Anxiety associated with depression     Anxiety associated with depression     Back pain 7/2/2012    Chest pain 5/1/2013    Diabetic nephropathy ([address]     Diabetic neuropathy ([address] 12/22/2011    Diabetic ulcer of left midfoot associated with type 2 diabetes mellitus, with fat layer exposed ([address] 7/18/2017    Diverticulosis     C scope + Dr. Kevin Tobar DM (diabetes mellitus), type 2 ([address] 2002    DR. 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 Kevin Tobar Necrotizing fasciitis [facility])     Nose fracture 1988    Panic attacks     Pericarditis 2003    Hospitalized with s/p heart cath normal    Peripheral autonomic neuropathy due to diabetes mellitus ([address]     Axonal EMG- [facility], March 2011    Sebaceous cyst 9/1/2011    URI (upper respiratory infection) 2/27/2012    WD-Wound, surgical, infected, initial encounter 12/8/2017    Wrist fracture 1986       PAST SURGICAL HISTORY    Past Surgical History:   Procedure Laterality Date   [address] 2003, 2013    Normal (Dr. Erma Essex)    COLONOSCOPY  6/2/2011    Pandiverticulosis, Nonbleeding internal hemorrhoids, Repeat colonoscopy at age 48- Dr. Venkat Saleem    \"had reconstruction surgery on nose a year after the other nose surgery\"    OTHER SURGICAL HISTORY Right 05/22/2015    I & D right great toe with partial amputation    OTHER SURGICAL HISTORY  12/04/2017    inc and drainage of abcess    DE DEEP INCIS FOOT BONE INFECTN Left 9/22/2018    LEFT FOOT DEBRIDEMENT INCISION AND DRAINAGE TOP AND BOTTOM performed by Brittni Soler DPM at [facility] N/A 5/15/2021    SCROTAL AND PERINEAL DEBRIDEMENT performed by Opal Quinteros MD at [facility]/A 5/16/2021    SCROTAL RE-DEBRIDEMENT  INCISION AND DRAINAGE performed by Opal Quinteros MD at [address] [place] [address] [phone]    sebaceous cyst removal times 4     TOE AMPUTATION      right great toe    TOE AMPUTATION Right 3/23/2019    TOE AMPUTATION RIGHT 5TH TOE performed by Brittni Soler DPM at [facility] TOE AMPUTATION Right 8/6/2019    TOE AMPUTATION RIGHT 4TH TOE performed by Brittni Soler DPM at [address] [place] [address] UPPER GASTROINTESTINAL ENDOSCOPY  06/2/2011    Mild gastritis with moderatley severe antritis, small hiatal hernia, Dr. Doneta Carrel 1/12/2019    EGD BIOPSY performed by Abel Bartlett MD at [place] [address] History   Problem Relation Age of Onset   [facility] Cancer Mother         ?site   [facility] Stroke Mother     Bleeding Prob Mother     Diabetes Father     Heart Disease Father     High Blood Pressure Father     Obesity Father     Kidney Disease Father     Diabetes Sister     High Blood Pressure Sister     Mental Illness Sister     Obesity Sister     High Blood Pressure Other     Mental Illness Other         bipolar    Other Son         cyst in ear canal    ADHD Daughter        SOCIAL HISTORY    Social History     Tobacco Use    Smoking status: Former Smoker     Years: 20.00     Quit date: 11/18/2017     Years since quitting: 3.8    Smokeless tobacco: Never Used   Vaping Use    Vaping Use: Never used   Substance Use Topics    Alcohol use: Yes     Comment: occ    Drug use: Yes     Frequency: 7.0 times per week     Types: Marijuana       ALLERGIES    Allergies   Allergen Reactions    Adhesive Tape Rash    Doxycycline Nausea And Vomiting    Reglan [Metoclopramide] Anxiety       MEDICATIONS    Current Outpatient Medications on File Prior to Encounter   Medication Sig Dispense Refill    apixaban (ELIQUIS) 2.5 MG TABS tablet Take 1 tablet by mouth 2 times daily 60 tablet 5    clopidogrel (PLAVIX) 75 MG tablet Take 1 tablet by mouth daily 30 tablet 3    furosemide (LASIX) 40 MG tablet Take 1 tablet by mouth daily 30 tablet 1    insulin glargine (LANTUS) 100 UNIT/ML injection vial Inject 75 Units into the skin nightly      insulin aspart (NOVOLOG RELION) 100 UNIT/ML injection vial Inject 35 Units into the skin 3 times daily (before meals)      atorvastatin (LIPITOR) 40 MG tablet Take 1 tablet by mouth nightly 30 tablet 3    metoprolol tartrate (LOPRESSOR) 25 MG tablet Take 1 tablet by mouth 2 times daily 60 tablet 3    amLODIPine (NORVASC) 10 MG tablet Take 1 tablet by mouth daily 30 tablet 3    chlorthalidone (HYGROTON) 25 MG tablet Take 1 tablet by mouth daily 30 tablet 3    sertraline (ZOLOFT) 50 MG tablet Take 2 tablets by mouth daily 30 tablet 3    aspirin 81 MG EC tablet Take 1 tablet by mouth daily 30 tablet 3    linagliptin (TRADJENTA) 5 MG tablet Take 1 tablet by mouth daily 30 tablet 5    Lancets MISC 1 each by Does not apply route daily 100 each 3    ondansetron (ZOFRAN) 4 MG tablet Take 1 tablet by mouth every 8 hours as needed for Nausea or Vomiting 20 tablet 0    glucose monitoring kit (FREESTYLE) monitoring kit 1 each by Does not apply route once for 1 dose. 1 kit 0     No current facility-administered medications on file prior to encounter. REVIEW OF SYSTEMS    Pertinent items are noted in HPI. Constitutional: Negative for systemic symptoms including fever, chills and malaise. Objective:      Resp 18     PHYSICAL EXAM    General: The patient is in no acute distress. Mental status:  Patient is appropriate, is  oriented to place and plan of care.   Dermatologic exam: Visual inspection of the periwound reveals the skin to be normal in turgor and texture  Wound exam: see wound description below in procedure note      Assessment:     Problem List Items Addressed This Visit     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 type 2 diabetes mellitus, with muscle involvement without evidence of necrosis ([address] - Primary        Procedure Note    Indications:  Based on my examination of this patient's wound(s) today, sharp excision into necrotic subcutaneous tissue is required to promote healing and evaluate the extent of previous healing. Performed by: APPLE Segura - CNP    Consent obtained: Yes    Time out taken:  Yes    Pain Control: Anesthetic  Anesthetic: 4% Lidocaine Liquid Topical     Debridement:Excisional Debridement    Using curette the wound(s) was/were sharply debrided down through and including the removal of subcutaneous tissue.         Devitalized Tissue Debrided:  slough and exudate    Pre Debridement Measurements:  Are located in the Wound Documentation Flow Sheet    All active wounds listed below with today's date are evaluated  Wound(s)    debrided this date include # : 1     Post  Debridement Measurements:  Wound 09/21/21 Foot Left;Plantar #1 (Active)   Wound Image   10/01/21 0950   Wound Etiology Pressure Stage  2 10/05/21 1507   Dressing Status New dressing applied 10/05/21 1551   Wound Cleansed Soap and water 10/05/21 1507   Offloading for Diabetic Foot Ulcers Forefoot offloading shoe 10/05/21 1551   Wound Length (cm) 1 cm 10/05/21 1507   Wound Width (cm) 0.6 cm 10/05/21 1507   Wound Depth (cm) 0.4 cm 10/05/21 1507   Wound Surface Area (cm^2) 0.6 cm^2 10/05/21 1507   Change in Wound Size % (l*w) 94 10/05/21 1507   Wound Volume (cm^3) 0.24 cm^3 10/05/21 1507   Wound Healing % 98 10/05/21 1507   Post-Procedure Length (cm) 1 cm 10/05/21 1526   Post-Procedure Width (cm) 0.6 cm 10/05/21 1526   Post-Procedure Depth (cm) 0.4 cm 10/05/21 1526   Post-Procedure Surface Area (cm^2) 0.6 cm^2 10/05/21 1526   Post-Procedure Volume (cm^3) 0.24 cm^3 10/05/21 1526   Distance Tunneling (cm) 0 cm 10/05/21 1507   Tunneling Position ___ O'Clock 0 10/05/21 1507   Undermining Starts ___ O'Clock 0 10/05/21 1507   Undermining Ends___ O'Clock 0 10/05/21 1507   Undermining Maxium Distance (cm) 0 10/05/21 1507   Wound Assessment Pink/red 10/05/21 1507   Drainage Amount None 10/05/21 1507   Drainage Description Serosanguinous 10/01/21 0950   Odor None 10/01/21 1029   Shazia-wound Assessment Hyperkeratosis (callous) 10/05/21 1507   Margins Attached edges 10/05/21 1507   Wound Thickness Description not for Pressure Injury Full thickness 10/05/21 1507   Number of days: 15       Percent of Wound(s) Debrided: approximately 100%    Total  Area  Debrided: 0.6 sq cm     Bleeding:  Minimal    Hemostasis Achieved:  by pressure    Procedural Pain:  0  / 10     Post Procedural Pain:  0 / 10     Response to treatment:  Well tolerated by patient. Status of wound progress and description from last visit: Improved, continue regimen. Plan:       Discharge Instructions       PHYSICIAN ORDERS AND DISCHARGE INSTRUCTIONS     NOTE: Upon discharge from the [address], you will receive a patient experience survey.  We would be grateful if you would take the time to fill this survey out.     Wound care order history:                 BAIRON's   Right   1.38    Left  0.94           Date 9/21/2021              Vascular studies:   Date               Imaging:   Date               Cultures:   Date obtained on 9/21/2021              Grafts:  Date               Antibiotics:               Earlier Wound care treatments:                          Primary care physician     Continuing wound care orders and information:              Residence:  Private               [facility] health care with: [facility]     Wound Medications:              DBOHV cleansing:                           AN not scrub or use excessive force.                          BKGX hands with soap and water before and after dressing changes.                         Prior to applying a clean dressing, cleanse wound with normal saline,                                wound cleanser, or mild soap and water.                           Ask the physician or nurse before getting the wound(s) wet in a shower              Daily Wound management:                          Keep weight off wounds and reposition every 2 hours.                          QOKHH standing for long periods of time.                          ZTVJX wraps/stockings in AM and remove at bedtime.                          If swelling is present, elevate legs to the level of the heart or above for 30                              minutes 4-5 times a day and/or when sitting.                                             When taking antibiotics take entire prescription as ordered by physician                             HILL not stop taking until medicine is all gone.                                                       Orders for this week: 10/5/2021                         Left plantar-- wash with soap and water, pat dry. Foam to medial leg. Gentamicin and stimulin powder to wound bed. Calcium alginate AG to mao wound with steri strips to hold in place and cover with ABD. Wrap with coban 2 lite.     C to change on Friday may use Unna boot in place of coban 2 lite until available.      [place] [facility] [employer]  RX:  Consult:  Central [phone]     Follow up on Thursday  in the wound care center  Call [phone] for any questions or concerns.   Date__________   Time____________        Signature____________________________________________________        Treatment Note Wound 21 Foot Left;Plantar #1-Dressing/Treatment:  (Gentamicin, stimulen, ag ca alg, ABD, Coban 2 Lite)    Written Patient Dismissal Instructions Given            Electronically signed by APPLE Haskins CNP on 10/6/2021 at 4:02 PM

## 2021-10-11 ENCOUNTER — HOSPITAL ENCOUNTER (OUTPATIENT)
Age: 46
Discharge: HOME | End: 2021-10-11
Payer: MEDICARE

## 2021-10-11 DIAGNOSIS — N18.32 STAGE 3B CHRONIC KIDNEY DISEASE (HCC): ICD-10-CM

## 2021-10-11 DIAGNOSIS — N17.9 ACUTE RENAL FAILURE, UNSPECIFIED ACUTE RENAL FAILURE TYPE (HCC): ICD-10-CM

## 2021-10-11 DIAGNOSIS — E11.21 DIABETIC NEPHROPATHY ASSOCIATED WITH TYPE 2 DIABETES MELLITUS (HCC): ICD-10-CM

## 2021-10-11 LAB
ALBUMIN SERPL-MCNC: 3.2 GM/DL (ref 3.4–5)
ANION GAP SERPL CALCULATED.3IONS-SCNC: 10 MMOL/L (ref 4–16)
BUN BLDV-MCNC: 64 MG/DL (ref 6–23)
CALCIUM SERPL-MCNC: 8.5 MG/DL (ref 8.3–10.6)
CHLORIDE BLD-SCNC: 110 MMOL/L (ref 99–110)
CO2: 17 MMOL/L (ref 21–32)
CREAT SERPL-MCNC: 2.3 MG/DL (ref 0.9–1.3)
GFR AFRICAN AMERICAN: 37 ML/MIN/1.73M2
GFR NON-AFRICAN AMERICAN: 31 ML/MIN/1.73M2
GLUCOSE BLD-MCNC: 183 MG/DL (ref 70–99)
PHOSPHORUS: 5.5 MG/DL (ref 2.5–4.9)
POTASSIUM SERPL-SCNC: 4.8 MMOL/L (ref 3.5–5.1)
SODIUM BLD-SCNC: 137 MMOL/L (ref 135–145)

## 2021-10-11 PROCEDURE — 80069 RENAL FUNCTION PANEL: CPT

## 2021-10-11 PROCEDURE — 36415 COLL VENOUS BLD VENIPUNCTURE: CPT

## 2021-10-14 ENCOUNTER — HOSPITAL ENCOUNTER (OUTPATIENT)
Dept: WOUND CARE | Age: 46
Discharge: HOME | End: 2021-10-14
Payer: MEDICARE

## 2021-10-14 VITALS
RESPIRATION RATE: 18 BRPM | TEMPERATURE: 97.8 F | HEART RATE: 66 BPM | DIASTOLIC BLOOD PRESSURE: 75 MMHG | SYSTOLIC BLOOD PRESSURE: 120 MMHG

## 2021-10-14 DIAGNOSIS — E11.621 DIABETIC ULCER OF LEFT MIDFOOT ASSOCIATED WITH TYPE 2 DIABETES MELLITUS, WITH MUSCLE INVOLVEMENT WITHOUT EVIDENCE OF NECROSIS (HCC): Primary | ICD-10-CM

## 2021-10-14 DIAGNOSIS — L97.425 DIABETIC ULCER OF LEFT MIDFOOT ASSOCIATED WITH TYPE 2 DIABETES MELLITUS, WITH MUSCLE INVOLVEMENT WITHOUT EVIDENCE OF NECROSIS (HCC): Primary | ICD-10-CM

## 2021-10-14 PROCEDURE — 11042 DBRDMT SUBQ TIS 1ST 20SQCM/<: CPT

## 2021-10-14 PROCEDURE — 11042 DBRDMT SUBQ TIS 1ST 20SQCM/<: CPT | Performed by: SURGERY

## 2021-10-14 RX ORDER — BACITRACIN ZINC AND POLYMYXIN B SULFATE 500; 1000 [USP'U]/G; [USP'U]/G
OINTMENT TOPICAL ONCE
Status: CANCELLED | OUTPATIENT
Start: 2021-10-14 | End: 2021-10-14

## 2021-10-14 RX ORDER — GENTAMICIN SULFATE 1 MG/G
OINTMENT TOPICAL ONCE
Status: CANCELLED | OUTPATIENT
Start: 2021-10-14 | End: 2021-10-14

## 2021-10-14 RX ORDER — GENTAMICIN SULFATE 1 MG/G
OINTMENT TOPICAL ONCE
Status: DISCONTINUED | OUTPATIENT
Start: 2021-10-14 | End: 2021-10-15 | Stop reason: HOSPADM

## 2021-10-14 RX ORDER — CLOBETASOL PROPIONATE 0.5 MG/G
OINTMENT TOPICAL ONCE
Status: CANCELLED | OUTPATIENT
Start: 2021-10-14 | End: 2021-10-14

## 2021-10-14 RX ORDER — BACITRACIN, NEOMYCIN, POLYMYXIN B 400; 3.5; 5 [USP'U]/G; MG/G; [USP'U]/G
OINTMENT TOPICAL ONCE
Status: CANCELLED | OUTPATIENT
Start: 2021-10-14 | End: 2021-10-14

## 2021-10-14 RX ORDER — LIDOCAINE 40 MG/G
CREAM TOPICAL ONCE
Status: CANCELLED | OUTPATIENT
Start: 2021-10-14 | End: 2021-10-14

## 2021-10-14 RX ORDER — BETAMETHASONE DIPROPIONATE 0.05 %
OINTMENT (GRAM) TOPICAL ONCE
Status: CANCELLED | OUTPATIENT
Start: 2021-10-14 | End: 2021-10-14

## 2021-10-14 RX ORDER — LIDOCAINE HYDROCHLORIDE 40 MG/ML
SOLUTION TOPICAL ONCE
Status: CANCELLED | OUTPATIENT
Start: 2021-10-14 | End: 2021-10-14

## 2021-10-14 RX ORDER — LIDOCAINE 50 MG/G
OINTMENT TOPICAL ONCE
Status: CANCELLED | OUTPATIENT
Start: 2021-10-14 | End: 2021-10-14

## 2021-10-14 RX ORDER — LIDOCAINE 50 MG/G
OINTMENT TOPICAL ONCE
Status: DISCONTINUED | OUTPATIENT
Start: 2021-10-14 | End: 2021-10-15 | Stop reason: HOSPADM

## 2021-10-14 RX ORDER — GINSENG 100 MG
CAPSULE ORAL ONCE
Status: CANCELLED | OUTPATIENT
Start: 2021-10-14 | End: 2021-10-14

## 2021-10-14 ASSESSMENT — PAIN DESCRIPTION - LOCATION: LOCATION: FOOT

## 2021-10-14 ASSESSMENT — PAIN DESCRIPTION - PAIN TYPE: TYPE: ACUTE PAIN

## 2021-10-14 ASSESSMENT — PAIN SCALES - GENERAL: PAINLEVEL_OUTOF10: 6

## 2021-10-14 ASSESSMENT — PAIN DESCRIPTION - ORIENTATION: ORIENTATION: LEFT

## 2021-10-14 ASSESSMENT — PAIN DESCRIPTION - DESCRIPTORS: DESCRIPTORS: THROBBING

## 2021-10-14 ASSESSMENT — PAIN DESCRIPTION - ONSET: ONSET: ON-GOING

## 2021-10-14 ASSESSMENT — PAIN DESCRIPTION - PROGRESSION: CLINICAL_PROGRESSION: NOT CHANGED

## 2021-10-14 ASSESSMENT — PAIN DESCRIPTION - FREQUENCY: FREQUENCY: INTERMITTENT

## 2021-10-14 NOTE — PROGRESS NOTES
Multilayer Compression Wrap   (Not Unna) Below the Knee    NAME:  Joyce Justice OF BIRTH:  1975  MEDICAL RECORD NUMBER:  [id number]  DATE:  10/14/2021    Multilayer compression wrap: Removed old Multilayer wrap if indicated and wash leg with mild soap/water. Applied moisturizing agent to dry skin as needed. Applied primary and secondary dressing as ordered. Applied multilayered dressing below the knee to left lower leg. Instructed patient/caregiver not to remove dressing and to keep it clean and dry. Instructed patient/caregiver on complications to report to provider, such as pain, numbness in toes, heavy drainage, and slippage of dressing. Instructed patient on purpose of compression dressing and on activity and exercise recommendations.       Electronically signed by Rebeca Corona LPN on [id number]/9847 at 3:46 PM

## 2021-10-15 NOTE — PROGRESS NOTES
Wound Care Center Progress Note With Procedure    Tg Miles  AGE: 55 y.o. GENDER: male  : 1975  EPISODE DATE:  10/14/2021     Subjective:     Chief Complaint   Patient presents with    Wound Check     LLE         HISTORY of PRESENT ILLNESS     Itzel Mcfadden a 55 y. o. 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  The condition is of moderate severity. Since last week he has been doing well. The edema has been better. Denies much drainage or pain. The patient has significant underlying medical conditions as below.      Wound Pain Timing/Severity: none  Quality of pain: N/A  Severity of pain:  0 / 10   Modifying Factors: diabetes, poor glucose control, chronic pressure, decreased mobility, shear force, obesity and anticoagulation therapy  Associated Signs/Symptoms: edema, erythema, drainage and odor     Diabetes: Yes, on Trulicity and insulin regimen, last A1c 8.6 21  Smoking:No, former smoker  Obesity: Yes  Anticoagulant therapy: Xarelto and aspirin  Immunosuppression:No  Other History: Scheduled for cardiac cath. 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 Recent history 21 of necrotizing fascitis groin.     Patient educated on the 6 essential components necessary for wound healing: Circulation, Debridements, Proper Dressings and Topical Wound Products, Infection Control, Edema Control and Offloading     Patient educated on those factors that negatively effect or impact wound healing: smoking, obesity, uncontrolled diabetes, anticoagulant and immunosuppressive regimens, inadequate nutrition, untreated arterial and venous disease if applicable and measures to manage edema.         PAST MEDICAL HISTORY        Diagnosis Date    Abscess     scrotal    Acute renal failure (ARF) ([facility] 2019    Anxiety associated with depression     Anxiety associated with depression     Back pain 7/2/2012    Chest pain 5/1/2013    Diabetic nephropathy ([address]     Diabetic neuropathy ([address] 12/22/2011    Diabetic ulcer of left midfoot associated with type 2 diabetes mellitus, with fat layer exposed ([address] 7/18/2017    Diverticulosis     C scope + Dr. Huggins Males DM (diabetes mellitus), type 2 ([address] 2002    DR. 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 Huggins Males Necrotizing fasciitis [facility])     Nose fracture 1988    Panic attacks     Pericarditis 2003    Hospitalized with s/p heart cath normal    Peripheral autonomic neuropathy due to diabetes mellitus ([address]     Axonal EMG- NCS, March 2011    Sebaceous cyst 9/1/2011    URI (upper respiratory infection) 2/27/2012    WD-Wound, surgical, infected, initial encounter 12/8/2017    Wrist fracture 1986       PAST SURGICAL HISTORY    Past Surgical History:   Procedure Laterality Date   [address] 2003, 2013    Normal (Dr. Tong Padilla)    COLONOSCOPY  6/2/2011    Pandiverticulosis, Nonbleeding internal hemorrhoids, Repeat colonoscopy at age 48- Dr. Shannon Santos    \"had reconstruction surgery on nose a year after the other nose surgery\"    OTHER SURGICAL HISTORY Right 05/22/2015    I & D right great toe with partial amputation    OTHER SURGICAL HISTORY  12/04/2017    inc and drainage of abcess    IN DEEP INCIS FOOT BONE INFECTN Left 9/22/2018    LEFT FOOT DEBRIDEMENT INCISION AND DRAINAGE TOP AND BOTTOM performed by Leticia Simpson DPM at [facility] N/A 5/15/2021    SCROTAL AND PERINEAL DEBRIDEMENT performed by Desi Tam MD at [address] [place] [address] 5/16/2021    SCROTAL RE-DEBRIDEMENT  INCISION AND DRAINAGE performed by Desi Tam MD at [address] [place] [address] [phone]    sebaceous cyst removal times 4     TOE AMPUTATION      right great toe    TOE AMPUTATION Right 3/23/2019    TOE AMPUTATION RIGHT 5TH TOE performed by Mark Caruso DPM at [facility] TOE AMPUTATION Right 8/6/2019    TOE AMPUTATION RIGHT 4TH TOE performed by Mark Caruso DPM at [address] [place] [address] UPPER GASTROINTESTINAL ENDOSCOPY  06/2/2011    Mild gastritis with moderatley severe antritis, small hiatal hernia, Dr. Elina Junior 1/12/2019    EGD BIOPSY performed by José Worley MD at [place] [address] History   Problem Relation Age of Onset   Jeramy Locke Cancer Mother         ?site   Jeramy Locke Stroke Mother     Bleeding Prob Mother     Diabetes Father     Heart Disease Father     High Blood Pressure Father     Obesity Father     Kidney Disease Father     Diabetes Sister     High Blood Pressure Sister     Mental Illness Sister     Obesity Sister     High Blood Pressure Other     Mental Illness Other         bipolar    Other Son         cyst in ear canal    ADHD Daughter        SOCIAL HISTORY    Social History     Tobacco Use    Smoking status: Former Smoker     Years: 20.00     Quit date: 11/18/2017     Years since quitting: 3.9    Smokeless tobacco: Never Used   Vaping Use    Vaping Use: Never used   Substance Use Topics    Alcohol use: Yes     Comment: occ    Drug use: Yes     Frequency: 7.0 times per week     Types: Marijuana       ALLERGIES    Allergies   Allergen Reactions    Adhesive Tape Rash    Doxycycline Nausea And Vomiting    Reglan [Metoclopramide] Anxiety       MEDICATIONS    Current Outpatient Medications on File Prior to Encounter   Medication Sig Dispense Refill    apixaban (ELIQUIS) 2.5 MG TABS tablet Take 1 tablet by mouth 2 times daily 60 tablet 5    clopidogrel (PLAVIX) 75 MG tablet Take 1 tablet by mouth daily 30 tablet 3    furosemide (LASIX) 40 MG tablet Take 1 tablet by mouth daily 30 tablet 1    insulin glargine (LANTUS) 100 UNIT/ML injection vial Inject 75 Units into the skin nightly      insulin aspart (NOVOLOG RELION) 100 UNIT/ML injection vial Inject 35 Units into the skin 3 times daily (before meals)      atorvastatin (LIPITOR) 40 MG tablet Take 1 tablet by mouth nightly 30 tablet 3    metoprolol tartrate (LOPRESSOR) 25 MG tablet Take 1 tablet by mouth 2 times daily 60 tablet 3    amLODIPine (NORVASC) 10 MG tablet Take 1 tablet by mouth daily 30 tablet 3    chlorthalidone (HYGROTON) 25 MG tablet Take 1 tablet by mouth daily 30 tablet 3    sertraline (ZOLOFT) 50 MG tablet Take 2 tablets by mouth daily 30 tablet 3    aspirin 81 MG EC tablet Take 1 tablet by mouth daily 30 tablet 3    linagliptin (TRADJENTA) 5 MG tablet Take 1 tablet by mouth daily 30 tablet 5    Lancets MISC 1 each by Does not apply route daily 100 each 3    ondansetron (ZOFRAN) 4 MG tablet Take 1 tablet by mouth every 8 hours as needed for Nausea or Vomiting 20 tablet 0    glucose monitoring kit (FREESTYLE) monitoring kit 1 each by Does not apply route once for 1 dose. 1 kit 0     No current facility-administered medications on file prior to encounter. REVIEW OF SYSTEMS    Pertinent items are noted in HPI. Constitutional: Negative for systemic symptoms including fever, chills and malaise. Objective:      /75   Pulse 66   Temp 97.8 °F (36.6 °C) (Temporal)   Resp 18     PHYSICAL EXAM  General: The patient is in no acute distress. Mental status:  Patient is appropriate, is  oriented to place and plan of care.   Dermatologic exam: Visual inspection of the periwound reveals the skin to be well hydrated  Wound exam: see wound description below in procedure note      Assessment:     Problem List Items Addressed This Visit     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 type 2 diabetes mellitus, with muscle involvement without evidence of necrosis ([address] - Primary    Relevant Medications    lidocaine (XYLOCAINE) 5 % ointment    gentamicin (GARAMYCIN) 0.1 % ointment    Other Relevant Orders    Initiate Outpatient Wound Care Protocol        Procedure Note    Indications:  Based on my examination of this patient's wound(s) today, sharp excision into necrotic epidermis, dermis and subcutaneous tissue is required to promote healing and evaluate the extent of previous healing. Performed by: Omar Chand MD    Consent obtained: Yes    Time out taken:  Yes    Pain Control: Anesthetic  Anesthetic: 5% Lidocaine Ointment Topical     Debridement:Excisional Debridement    Using curette the wound(s) was/were sharply debrided down through and including the removal of epidermis, dermis and subcutaneous tissue.         Devitalized Tissue Debrided:  fibrin and callus    Pre Debridement Measurements:  Are located in the Wound Documentation Flow Sheet    All active wounds listed below with today's date are evaluated  Wound(s)    debrided this date include # : 1     Post  Debridement Measurements:  Wound 12/03/17 Other (Comment) Foot Left (Active)   Number of days: 1411       Wound 12/08/17 #3 abdoment (onset 3 days ago) SURGICAL (Active)   Number of days: 1406       Wound 12/08/17 #4 Lt plantar (onset 6 months ago) DIABETIC ALCALA 1 (Active)   Number of days: 1406       Wound 05/18/18 # 5 LEFT PLANTAR (ONSET 1 YEAR AGO) DM WAG 1 (Active)   Number of days: 6442       Wound 09/17/18 Left dorsal foot and 4th toe amp site 9/19/18 (Active)   Number of days: 1123       Incision 05/22/15 Foot Right (Active)   Number of days: 2337       Incision 12/04/17 Abdomen Mid (Active)   Number of days: 1410       Incision 09/22/18 Foot Left (Active)   Number of days: 6445       Wound 01/11/19 left plantar foot wound (Active)   Number of days: 1007       Wound 03/21/19 Toe (Comment  which one) Anterior;Right (Active)   Number of days: 938       Wound 03/21/19 Foot Left;Posterior hard callus area (Active)   Number of days: 938       Wound 03/24/21 Left;Plantar (Active)   Number of days: 204 Wound 05/13/21 Left;Plantar (Active)   Number of days: 154       Wound 05/17/21 Groin Right scrotum extends to lower buttock (Active)   Wound Image   10/01/21 0950   Wound Etiology Non-Healing Surgical 10/01/21 0950   Dressing Status New dressing applied 10/01/21 1000   Wound Cleansed Cleansed with saline 10/01/21 0950   Wound Length (cm) 7 cm 10/01/21 0950   Wound Width (cm) 0.6 cm 10/01/21 0950   Wound Depth (cm) 0.1 cm 10/01/21 0950   Wound Surface Area (cm^2) 4.2 cm^2 10/01/21 0950   Change in Wound Size % (l*w) 98.76 10/01/21 0950   Wound Volume (cm^3) 0.42 cm^3 10/01/21 0950   Wound Healing % 100 10/01/21 0950   Distance Tunneling (cm) 0 cm 10/01/21 0950   Tunneling Position ___ O'Clock 0 10/01/21 0950   Undermining Starts ___ O'Clock 0 10/01/21 0950   Undermining Ends___ O'Clock 0 10/01/21 0950   Undermining Maxium Distance (cm) 0 10/01/21 0950   Wound Assessment Pink/red; Hyper granulation tissue 10/01/21 0950   Drainage Amount Small 10/01/21 0950   Drainage Description Serosanguinous 10/01/21 0950   Odor None 10/01/21 0950   Shazia-wound Assessment Maceration 10/01/21 0950   Margins Defined edges 10/01/21 0950   Wound Thickness Description not for Pressure Injury Full thickness 10/01/21 0950   Number of days: 150       Wound 09/21/21 Foot Left;Plantar #1 (Active)   Wound Image   10/01/21 0950   Wound Etiology Pressure Stage  2 10/14/21 1501   Dressing Status New dressing applied 10/14/21 1544   Wound Cleansed Soap and water 10/14/21 1501   Offloading for Diabetic Foot Ulcers Forefoot offloading shoe 10/14/21 1544   Wound Length (cm) 1 cm 10/14/21 1501   Wound Width (cm) 0.6 cm 10/14/21 1501   Wound Depth (cm) 0.3 cm 10/14/21 1501   Wound Surface Area (cm^2) 0.6 cm^2 10/14/21 1501   Change in Wound Size % (l*w) 94 10/14/21 1501   Wound Volume (cm^3) 0.18 cm^3 10/14/21 1501   Wound Healing % 98 10/14/21 1501   Post-Procedure Length (cm) 1 cm 10/14/21 1540   Post-Procedure Width (cm) 0.6 cm 10/14/21 1540 Post-Procedure Depth (cm) 0.4 cm 10/05/21 1526   Post-Procedure Surface Area (cm^2) 0.6 cm^2 10/14/21 1540   Post-Procedure Volume (cm^3) 0.24 cm^3 10/05/21 1526   Distance Tunneling (cm) 0 cm 10/14/21 1501   Tunneling Position ___ O'Clock 0 10/14/21 1501   Undermining Starts ___ O'Clock 0 10/14/21 1501   Undermining Ends___ O'Clock 0 10/14/21 1501   Undermining Maxium Distance (cm) 0 10/14/21 1501   Wound Assessment Pink/red 10/14/21 1501   Drainage Amount None 10/14/21 1501   Drainage Description Serosanguinous 10/14/21 1501   Odor None 10/14/21 1501   Shazia-wound Assessment Hyperkeratosis (callous) 10/14/21 1501   Margins Attached edges 10/14/21 1501   Wound Thickness Description not for Pressure Injury Full thickness 10/14/21 1501   Number of days: 23       Percent of Wound(s) Debrided: approximately 100%    Total  Area  Debrided:  0.6 sq cm     Bleeding:  Minimal    Hemostasis Achieved:  by pressure    Procedural Pain:  0  / 10     Post Procedural Pain:  0 / 10     Response to treatment:  Well tolerated by patient. Status of wound progress and description from last visit:   Similar in size to last week but patient reports improving edema and drainage    Plan:   Continue present management for this week. Recheck in 1 week    Discharge Instructions       PHYSICIAN ORDERS AND DISCHARGE INSTRUCTIONS     NOTE: Upon discharge from the [address], you will receive a patient experience survey.  We would be grateful if you would take the time to fill this survey out.     Wound care order history:                 BAIRON's   Right   1.38    Left  0.94           Date 9/21/2021              Vascular studies:   Date               Imaging:   Date               Cultures:   Date obtained on 9/21/2021              Grafts:  Date               Antibiotics:               Earlier Wound care treatments:                          Primary care physician     Continuing wound care orders and information:              Residence:  Private               Continue [facility]: [facility]     Wound Medications:              UMUZI cleansing:                           IJ not scrub or use excessive force.                          Wash hands with soap and water before and after dressing changes.                         Prior to applying a clean dressing, cleanse wound with normal saline,                                wound cleanser, or mild soap and water.                           Ask the physician or nurse before getting the wound(s) wet in a shower              Daily Wound management:                          Keep weight off wounds and reposition every 2 hours.                          VNBKU standing for long periods of time.                          VHFDL wraps/stockings in AM and remove at bedtime.                          If swelling is present, elevate legs to the level of the heart or above for 30 minutes 4-5 times a day and/or when sitting.                                             When taking antibiotics take entire prescription as ordered by physician do not stop taking until medicine is all gone.                                                       Orders for this week: 10/14/2021                   Left Plantar Foot -- wash with soap and water, pat dry. Foam to medial leg. Gentamicin and stimulin powder to wound bed. Calcium alginate AG to mao wound with steri strips to hold in place and cover with ABD. Wrap with Coban 2 lite.     C to change on Friday may use Unna boot until Coban 2 lite is available.        Rx: [facility] [employer]  Consult:  [facility]: [phone]     Follow up with Dr Amina Zhang in 1 week in the wound care center  Call [phone] for any questions or concerns.   Date__________   Time____________        Treatment Note Wound 09/21/21 Foot Left;Plantar #1-Dressing/Treatment:  (Gentamicin, stimulen, ag ca alg, ABD, Coban 2 Lite)    Written Patient Dismissal Instructions Given Electronically signed by Indigo Mcfadden MD on 10/14/2021 at 9:55 PM

## 2021-10-18 ENCOUNTER — HOSPITAL ENCOUNTER (OUTPATIENT)
Age: 46
Discharge: HOME | End: 2021-10-18
Payer: MEDICARE

## 2021-10-18 LAB
ALBUMIN SERPL-MCNC: 3.4 GM/DL (ref 3.4–5)
ANION GAP SERPL CALCULATED.3IONS-SCNC: 9 MMOL/L (ref 4–16)
BUN BLDV-MCNC: 61 MG/DL (ref 6–23)
CALCIUM SERPL-MCNC: 8.7 MG/DL (ref 8.3–10.6)
CHLORIDE BLD-SCNC: 104 MMOL/L (ref 99–110)
CO2: 20 MMOL/L (ref 21–32)
CREAT SERPL-MCNC: 2.2 MG/DL (ref 0.9–1.3)
GFR AFRICAN AMERICAN: 39 ML/MIN/1.73M2
GFR NON-AFRICAN AMERICAN: 32 ML/MIN/1.73M2
GLUCOSE BLD-MCNC: 158 MG/DL (ref 70–99)
PHOSPHORUS: 5 MG/DL (ref 2.5–4.9)
POTASSIUM SERPL-SCNC: 5.3 MMOL/L (ref 3.5–5.1)
SODIUM BLD-SCNC: 133 MMOL/L (ref 135–145)

## 2021-10-18 PROCEDURE — 36415 COLL VENOUS BLD VENIPUNCTURE: CPT

## 2021-10-18 PROCEDURE — 80069 RENAL FUNCTION PANEL: CPT

## 2021-10-19 ENCOUNTER — HOSPITAL ENCOUNTER (OUTPATIENT)
Dept: WOUND CARE | Age: 46
Discharge: HOME | End: 2021-10-19
Payer: MEDICARE

## 2021-10-19 VITALS
HEART RATE: 69 BPM | RESPIRATION RATE: 20 BRPM | SYSTOLIC BLOOD PRESSURE: 121 MMHG | TEMPERATURE: 98.1 F | DIASTOLIC BLOOD PRESSURE: 74 MMHG

## 2021-10-19 DIAGNOSIS — E11.621 DIABETIC ULCER OF LEFT MIDFOOT ASSOCIATED WITH TYPE 2 DIABETES MELLITUS, WITH MUSCLE INVOLVEMENT WITHOUT EVIDENCE OF NECROSIS (HCC): Primary | ICD-10-CM

## 2021-10-19 DIAGNOSIS — L97.425 DIABETIC ULCER OF LEFT MIDFOOT ASSOCIATED WITH TYPE 2 DIABETES MELLITUS, WITH MUSCLE INVOLVEMENT WITHOUT EVIDENCE OF NECROSIS (HCC): Primary | ICD-10-CM

## 2021-10-19 PROCEDURE — 11042 DBRDMT SUBQ TIS 1ST 20SQCM/<: CPT

## 2021-10-19 PROCEDURE — 11042 DBRDMT SUBQ TIS 1ST 20SQCM/<: CPT | Performed by: NURSE PRACTITIONER

## 2021-10-19 RX ORDER — BACITRACIN, NEOMYCIN, POLYMYXIN B 400; 3.5; 5 [USP'U]/G; MG/G; [USP'U]/G
OINTMENT TOPICAL ONCE
Status: CANCELLED | OUTPATIENT
Start: 2021-10-19 | End: 2021-10-19

## 2021-10-19 RX ORDER — BETAMETHASONE DIPROPIONATE 0.05 %
OINTMENT (GRAM) TOPICAL ONCE
Status: CANCELLED | OUTPATIENT
Start: 2021-10-19 | End: 2021-10-19

## 2021-10-19 RX ORDER — LIDOCAINE HYDROCHLORIDE 40 MG/ML
SOLUTION TOPICAL ONCE
Status: CANCELLED | OUTPATIENT
Start: 2021-10-19 | End: 2021-10-19

## 2021-10-19 RX ORDER — BACITRACIN ZINC AND POLYMYXIN B SULFATE 500; 1000 [USP'U]/G; [USP'U]/G
OINTMENT TOPICAL ONCE
Status: CANCELLED | OUTPATIENT
Start: 2021-10-19 | End: 2021-10-19

## 2021-10-19 RX ORDER — LIDOCAINE 40 MG/G
CREAM TOPICAL ONCE
Status: CANCELLED | OUTPATIENT
Start: 2021-10-19 | End: 2021-10-19

## 2021-10-19 RX ORDER — GENTAMICIN SULFATE 1 MG/G
OINTMENT TOPICAL ONCE
Status: CANCELLED | OUTPATIENT
Start: 2021-10-19 | End: 2021-10-19

## 2021-10-19 RX ORDER — GINSENG 100 MG
CAPSULE ORAL ONCE
Status: CANCELLED | OUTPATIENT
Start: 2021-10-19 | End: 2021-10-19

## 2021-10-19 RX ORDER — LIDOCAINE 50 MG/G
OINTMENT TOPICAL ONCE
Status: CANCELLED | OUTPATIENT
Start: 2021-10-19 | End: 2021-10-19

## 2021-10-19 RX ORDER — CLOBETASOL PROPIONATE 0.5 MG/G
OINTMENT TOPICAL ONCE
Status: CANCELLED | OUTPATIENT
Start: 2021-10-19 | End: 2021-10-19

## 2021-10-19 ASSESSMENT — PAIN DESCRIPTION - FREQUENCY: FREQUENCY: CONTINUOUS

## 2021-10-19 ASSESSMENT — PAIN DESCRIPTION - ORIENTATION: ORIENTATION: LEFT

## 2021-10-19 ASSESSMENT — PAIN DESCRIPTION - LOCATION: LOCATION: FOOT

## 2021-10-19 ASSESSMENT — PAIN DESCRIPTION - PROGRESSION: CLINICAL_PROGRESSION: NOT CHANGED

## 2021-10-19 ASSESSMENT — PAIN DESCRIPTION - ONSET: ONSET: ON-GOING

## 2021-10-19 ASSESSMENT — PAIN DESCRIPTION - PAIN TYPE: TYPE: ACUTE PAIN

## 2021-10-19 ASSESSMENT — PAIN DESCRIPTION - DESCRIPTORS: DESCRIPTORS: STABBING;CONSTANT

## 2021-10-19 ASSESSMENT — PAIN - FUNCTIONAL ASSESSMENT: PAIN_FUNCTIONAL_ASSESSMENT: PREVENTS OR INTERFERES SOME ACTIVE ACTIVITIES AND ADLS

## 2021-10-19 ASSESSMENT — PAIN SCALES - GENERAL: PAINLEVEL_OUTOF10: 6

## 2021-10-19 NOTE — PROGRESS NOTES
Multilayer Compression Wrap   (Not Unna) Below the Knee    NAME:  Shruti Fatima OF BIRTH:  1975  MEDICAL RECORD NUMBER:  [id number]  DATE:  10/19/2021    Multilayer compression wrap: Removed old Multilayer wrap if indicated and wash leg with mild soap/water. Applied moisturizing agent to dry skin as needed. Applied primary and secondary dressing as ordered. Applied multilayered dressing below the knee to left lower leg. Instructed patient/caregiver not to remove dressing and to keep it clean and dry. Instructed patient/caregiver on complications to report to provider, such as pain, numbness in toes, heavy drainage, and slippage of dressing. Instructed patient on purpose of compression dressing and on activity and exercise recommendations.       Electronically signed by Juventino Cranker, LPN on 96/24/9098 at 3:51 PM

## 2021-10-22 NOTE — PROGRESS NOTES
Wound Care Center Progress Note With Procedure    Coni Ped  AGE: 55 y.o. GENDER: male  : 1975  EPISODE DATE:  10/19/2021     Subjective:     Chief Complaint   Patient presents with    Wound Check     left leg         HISTORY of PRESENT ILLNESS     Jose Luis Breaux a 55 y. o. 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  The condition is of moderate severity. The ulcer has been present for approximately 2 years. Jazzmine Pozo underlying cause is thought to be diabetes and pressure. The patients care to date has included routine care with debridement per Dr. Marcy Schofield. His insurance has recently changed and hasn't seen Dr. Marcy Schofield for 2 months. He was evaluated at the [facility] 21 with chest pain and diabetic left foot ulcer. Anurag Prince was started on Bactrim and Keflex through 21. Venous ultrasound negative for DVT, left foot Xray demonstrated no destructive bony abnormality identified.  The patient has significant underlying medical conditions as below.      Wound Pain Timing/Severity: none  Quality of pain: N/A  Severity of pain:  0 / 10   Modifying Factors: diabetes, poor glucose control, chronic pressure, decreased mobility, shear force, obesity and anticoagulation therapy  Associated Signs/Symptoms: edema, erythema, drainage and odor     Diabetes: Yes, on Trulicity and insulin regimen, last A1c 8.6 21  Smoking:No, former smoker  Obesity: Yes  Anticoagulant therapy: Xarelto and aspirin  Immunosuppression:No  Other History: Scheduled for cardiac cath this week. 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  Recent history 21 of necrotizing fascitis groin.     Patient educated on the 6 essential components necessary for wound healing: Circulation, Debridements, Proper Dressings and Topical Wound Products, Infection Control, Edema Control and Offloading.     Patient educated on those factors that negatively effect or impact wound healing: smoking, obesity, uncontrolled diabetes, anticoagulant and immunosuppressive regimens, inadequate nutrition, untreated arterial and venous disease if applicable and measures to manage edema. PAST MEDICAL HISTORY        Diagnosis Date    Abscess 2010    scrotal    Acute renal failure (ARF) ([address] 8/4/2019    Anxiety associated with depression     Anxiety associated with depression     Back pain 7/2/2012    Chest pain 5/1/2013    Diabetic nephropathy ([address]     Diabetic neuropathy ([address] 12/22/2011    Diabetic ulcer of left midfoot associated with type 2 diabetes mellitus, with fat layer exposed ([address] 7/18/2017    Diverticulosis     C scope + Dr. Deisy Bowie DM (diabetes mellitus), type 2 ([address] 2002    DR. 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 Deisy Bowie Necrotizing fasciitis [facility])     Nose fracture 1988    Panic attacks     Pericarditis 2003    Hospitalized with s/p heart cath normal    Peripheral autonomic neuropathy due to diabetes mellitus ([address]     Axonal EMG- [facility], March 2011    Sebaceous cyst 9/1/2011    URI (upper respiratory infection) 2/27/2012    WD-Wound, surgical, infected, initial encounter 12/8/2017    Wrist fracture 1986       PAST SURGICAL HISTORY    Past Surgical History:   Procedure Laterality Date   [address] 2003, 2013    Normal (Dr. Dolores Rodrigez)    COLONOSCOPY  6/2/2011    Pandiverticulosis, Nonbleeding internal hemorrhoids, Repeat colonoscopy at age 48- Dr. Brenda Maxwell    \"had reconstruction surgery on nose a year after the other nose surgery\"    OTHER SURGICAL HISTORY Right 05/22/2015    I & D right great toe with partial amputation    OTHER SURGICAL HISTORY  12/04/2017    inc and drainage of abcess    TX DEEP INCIS FOOT BONE INFECTN Left 9/22/2018    LEFT FOOT DEBRIDEMENT INCISION AND DRAINAGE TOP AND BOTTOM performed by Monico Nayak DPM at [facility] N/A 5/15/2021    SCROTAL AND PERINEAL DEBRIDEMENT performed by Joby Madrid MD at [facility]/A 5/16/2021    SCROTAL RE-DEBRIDEMENT  INCISION AND DRAINAGE performed by Joby Madrid MD at [address] [place] [address] [phone]    sebaceous cyst removal times 4     TOE AMPUTATION      right great toe    TOE AMPUTATION Right 3/23/2019    TOE AMPUTATION RIGHT 5TH TOE performed by Monico Nayak DPM at [facility] TOE AMPUTATION Right 8/6/2019    TOE AMPUTATION RIGHT 4TH TOE performed by Monico Nayak DPM at [address] [place] [address] UPPER GASTROINTESTINAL ENDOSCOPY  06/2/2011    Mild gastritis with moderatley severe antritis, small hiatal hernia, Dr. Thiago Cadena 1/12/2019    EGD BIOPSY performed by Manolo Chin MD at [place] [address] History   Problem Relation Age of Onset   Rebbecca Hinders Cancer Mother         ?site   Rebbecca Hinders Stroke Mother     Bleeding Prob Mother     Diabetes Father     Heart Disease Father     High Blood Pressure Father     Obesity Father     Kidney Disease Father     Diabetes Sister     High Blood Pressure Sister     Mental Illness Sister     Obesity Sister     High Blood Pressure Other     Mental Illness Other         bipolar    Other Son         cyst in ear canal    ADHD Daughter        SOCIAL HISTORY    Social History     Tobacco Use    Smoking status: Former Smoker     Years: 20.00     Quit date: 11/18/2017     Years since quitting: 3.9    Smokeless tobacco: Never Used   Vaping Use    Vaping Use: Never used   Substance Use Topics    Alcohol use: Yes     Comment: occ    Drug use: Yes     Frequency: 7.0 times per week     Types: Marijuana       ALLERGIES    Allergies   Allergen Reactions    Adhesive Tape Rash    Doxycycline Nausea And Vomiting    Reglan [Metoclopramide] Anxiety       MEDICATIONS    Current Outpatient Medications on File Prior to Encounter   Medication Sig Dispense Refill    apixaban (ELIQUIS) 2.5 MG TABS tablet Take 1 tablet by mouth 2 times daily 60 tablet 5    clopidogrel (PLAVIX) 75 MG tablet Take 1 tablet by mouth daily 30 tablet 3    furosemide (LASIX) 40 MG tablet Take 1 tablet by mouth daily 30 tablet 1    insulin glargine (LANTUS) 100 UNIT/ML injection vial Inject 75 Units into the skin nightly      insulin aspart (NOVOLOG RELION) 100 UNIT/ML injection vial Inject 35 Units into the skin 3 times daily (before meals)      atorvastatin (LIPITOR) 40 MG tablet Take 1 tablet by mouth nightly 30 tablet 3    metoprolol tartrate (LOPRESSOR) 25 MG tablet Take 1 tablet by mouth 2 times daily 60 tablet 3    amLODIPine (NORVASC) 10 MG tablet Take 1 tablet by mouth daily 30 tablet 3    chlorthalidone (HYGROTON) 25 MG tablet Take 1 tablet by mouth daily 30 tablet 3    sertraline (ZOLOFT) 50 MG tablet Take 2 tablets by mouth daily 30 tablet 3    aspirin 81 MG EC tablet Take 1 tablet by mouth daily 30 tablet 3    linagliptin (TRADJENTA) 5 MG tablet Take 1 tablet by mouth daily 30 tablet 5    ondansetron (ZOFRAN) 4 MG tablet Take 1 tablet by mouth every 8 hours as needed for Nausea or Vomiting 20 tablet 0    Lancets MISC 1 each by Does not apply route daily 100 each 3    glucose monitoring kit (FREESTYLE) monitoring kit 1 each by Does not apply route once for 1 dose. 1 kit 0     No current facility-administered medications on file prior to encounter. REVIEW OF SYSTEMS    Pertinent items are noted in HPI. Constitutional: Negative for systemic symptoms including fever, chills and malaise. Objective:      /74   Pulse 69   Temp 98.1 °F (36.7 °C) (Temporal)   Resp 20     PHYSICAL EXAM    General: The patient is in no acute distress.     Mental status:  Patient is appropriate, is  oriented to place and plan 0.4 cm 10/05/21 1526   Post-Procedure Surface Area (cm^2) 0.6 cm^2 10/14/21 1540   Post-Procedure Volume (cm^3) 0.24 cm^3 10/05/21 1526   Distance Tunneling (cm) 0 cm 10/19/21 1457   Tunneling Position ___ O'Clock 0 10/19/21 1457   Undermining Starts ___ O'Clock 0 10/19/21 1457   Undermining Ends___ O'Clock 0 10/19/21 1457   Undermining Maxium Distance (cm) 0 10/19/21 1457   Wound Assessment Pink/red 10/19/21 1457   Drainage Amount None 10/19/21 1457   Drainage Description Serosanguinous 10/19/21 1457   Odor None 10/19/21 1457   Shazia-wound Assessment Hyperkeratosis (callous) 10/19/21 1457   Margins Attached edges 10/19/21 1457   Wound Thickness Description not for Pressure Injury Full thickness 10/19/21 1457   Number of days: 30       Percent of Wound(s) Debrided: approximately 100%    Total  Area  Debrided:  0.6 sq cm     Bleeding:  Minimal    Hemostasis Achieved:  by pressure    Procedural Pain:  0  / 10     Post Procedural Pain:  0 / 10     Response to treatment:  Well tolerated by patient. Status of wound progress and description from last visit: Stable      Plan:       Discharge Instructions         Discharge Instructions        PHYSICIAN ORDERS AND DISCHARGE INSTRUCTIONS     NOTE: Upon discharge from the [address], you will receive a patient experience survey.  We would be grateful if you would take the time to fill this survey out.     Wound care order history:                 BAIRON's   Right   1.38    Left  0.94           Date 9/21/2021              Vascular studies:   Date               Imaging:   Date               Cultures:   Date obtained on 9/21/2021              Grafts:  Date               Antibiotics:               Earlier Wound care treatments:                          Primary care physician     Continuing wound care orders and information:              Residence:  Private               Continue home health care with: [facility]     Wound Medications:              PQTHL cleansing:                         HR not scrub or use excessive force.                          Wash hands with soap and water before and after dressing changes.                         Prior to applying a clean dressing, cleanse wound with normal saline,                                wound cleanser, or mild soap and water.                           Ask the physician or nurse before getting the wound(s) wet in a shower              Daily Wound management:                          Keep weight off wounds and reposition every 2 hours.                          JRUNB standing for long periods of time.                          HVVWY wraps/stockings in AM and remove at bedtime.                          If swelling is present, elevate legs to the level of the heart or above for 30 minutes 4-5 times a day and/or when sitting.                                             When taking antibiotics take entire prescription as ordered by physician do not stop taking until medicine is all gone.                                                       Orders for this week: 10/19/2021                   Left Plantar Foot -- wash with soap and water, pat dry. Foam to medial leg. Gentamicin and stimulin powder to wound bed. Calcium alginate AG to mao wound with steri strips to hold in place and cover with ABD. Wrap with Coban 2 lite.     [facility]HC to change on Friday may use Unna boot until Coban 2 lite is available.         Rx: [facility] [employer]  Consult:  Central [phone]     Follow up with Moses Stafford CNP  in 1 week in the wound care center  Call [phone] for any questions or concerns.   Date__________   Time____________          Treatment Note Wound 21 Foot Left;Plantar #1-Dressing/Treatment:  (Gentamicin, stimulen, ag ca alg, ABD, Coban 2 Lite)    Written Patient Dismissal Instructions Given            Electronically signed by APPLE Hopkins CNP on 10/22/2021 at 6:09 AM

## 2021-10-26 ENCOUNTER — HOSPITAL ENCOUNTER (OUTPATIENT)
Dept: WOUND CARE | Age: 46
Discharge: HOME | End: 2021-10-26
Payer: MEDICARE

## 2021-10-26 VITALS
SYSTOLIC BLOOD PRESSURE: 112 MMHG | RESPIRATION RATE: 18 BRPM | HEART RATE: 82 BPM | TEMPERATURE: 97.7 F | DIASTOLIC BLOOD PRESSURE: 75 MMHG

## 2021-10-26 DIAGNOSIS — E11.621 DIABETIC ULCER OF LEFT MIDFOOT ASSOCIATED WITH TYPE 2 DIABETES MELLITUS, WITH MUSCLE INVOLVEMENT WITHOUT EVIDENCE OF NECROSIS (HCC): Primary | ICD-10-CM

## 2021-10-26 DIAGNOSIS — L97.425 DIABETIC ULCER OF LEFT MIDFOOT ASSOCIATED WITH TYPE 2 DIABETES MELLITUS, WITH MUSCLE INVOLVEMENT WITHOUT EVIDENCE OF NECROSIS (HCC): Primary | ICD-10-CM

## 2021-10-26 PROCEDURE — 11042 DBRDMT SUBQ TIS 1ST 20SQCM/<: CPT

## 2021-10-26 PROCEDURE — 11042 DBRDMT SUBQ TIS 1ST 20SQCM/<: CPT | Performed by: NURSE PRACTITIONER

## 2021-10-26 RX ORDER — GINSENG 100 MG
CAPSULE ORAL ONCE
Status: CANCELLED | OUTPATIENT
Start: 2021-10-26 | End: 2021-10-26

## 2021-10-26 RX ORDER — CLOBETASOL PROPIONATE 0.5 MG/G
OINTMENT TOPICAL ONCE
Status: CANCELLED | OUTPATIENT
Start: 2021-10-26 | End: 2021-10-26

## 2021-10-26 RX ORDER — LIDOCAINE 40 MG/G
CREAM TOPICAL ONCE
Status: CANCELLED | OUTPATIENT
Start: 2021-10-26 | End: 2021-10-26

## 2021-10-26 RX ORDER — BACITRACIN, NEOMYCIN, POLYMYXIN B 400; 3.5; 5 [USP'U]/G; MG/G; [USP'U]/G
OINTMENT TOPICAL ONCE
Status: CANCELLED | OUTPATIENT
Start: 2021-10-26 | End: 2021-10-26

## 2021-10-26 RX ORDER — BETAMETHASONE DIPROPIONATE 0.05 %
OINTMENT (GRAM) TOPICAL ONCE
Status: CANCELLED | OUTPATIENT
Start: 2021-10-26 | End: 2021-10-26

## 2021-10-26 RX ORDER — LIDOCAINE 50 MG/G
OINTMENT TOPICAL ONCE
Status: CANCELLED | OUTPATIENT
Start: 2021-10-26 | End: 2021-10-26

## 2021-10-26 RX ORDER — LIDOCAINE 50 MG/G
OINTMENT TOPICAL ONCE
Status: DISCONTINUED | OUTPATIENT
Start: 2021-10-26 | End: 2021-10-27 | Stop reason: HOSPADM

## 2021-10-26 RX ORDER — LIDOCAINE HYDROCHLORIDE 40 MG/ML
SOLUTION TOPICAL ONCE
Status: CANCELLED | OUTPATIENT
Start: 2021-10-26 | End: 2021-10-26

## 2021-10-26 RX ORDER — GENTAMICIN SULFATE 1 MG/G
OINTMENT TOPICAL ONCE
Status: CANCELLED | OUTPATIENT
Start: 2021-10-26 | End: 2021-10-26

## 2021-10-26 RX ORDER — BACITRACIN ZINC AND POLYMYXIN B SULFATE 500; 1000 [USP'U]/G; [USP'U]/G
OINTMENT TOPICAL ONCE
Status: CANCELLED | OUTPATIENT
Start: 2021-10-26 | End: 2021-10-26

## 2021-10-26 RX ORDER — GENTAMICIN SULFATE 1 MG/G
OINTMENT TOPICAL ONCE
Status: DISCONTINUED | OUTPATIENT
Start: 2021-10-26 | End: 2021-10-27 | Stop reason: HOSPADM

## 2021-10-26 ASSESSMENT — PAIN SCALES - GENERAL: PAINLEVEL_OUTOF10: 6

## 2021-10-26 ASSESSMENT — PAIN DESCRIPTION - FREQUENCY: FREQUENCY: CONTINUOUS

## 2021-10-26 ASSESSMENT — PAIN DESCRIPTION - DESCRIPTORS: DESCRIPTORS: STABBING;CONSTANT

## 2021-10-26 ASSESSMENT — PAIN DESCRIPTION - ONSET: ONSET: ON-GOING

## 2021-10-26 ASSESSMENT — PAIN DESCRIPTION - PROGRESSION: CLINICAL_PROGRESSION: NOT CHANGED

## 2021-10-26 ASSESSMENT — PAIN DESCRIPTION - ORIENTATION: ORIENTATION: LEFT

## 2021-10-26 ASSESSMENT — PAIN DESCRIPTION - LOCATION: LOCATION: FOOT

## 2021-10-26 ASSESSMENT — PAIN - FUNCTIONAL ASSESSMENT: PAIN_FUNCTIONAL_ASSESSMENT: PREVENTS OR INTERFERES SOME ACTIVE ACTIVITIES AND ADLS

## 2021-10-26 ASSESSMENT — PAIN DESCRIPTION - PAIN TYPE: TYPE: ACUTE PAIN

## 2021-10-26 NOTE — PROGRESS NOTES
Wound Care Center Progress Note With Procedure    Chente Choe  AGE: 55 y.o. GENDER: male  : 1975  EPISODE DATE:  10/26/2021     Subjective:     Chief Complaint   Patient presents with    Wound Check     left foot          HISTORY of PRESENT ILLNESS     Monica Romano a 55 y. o. 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  The condition is of moderate severity. The ulcer has been present for approximately 2 years. Sunshine Ochoa underlying cause is thought to be diabetes and pressure. The patients care to date has included routine care with debridement per Dr. Mc Valencia. His insurance has recently changed and hasn't seen Dr. Mc Valencia for 2 months. He was evaluated at the [facility] 21 with chest pain and diabetic left foot ulcer. Martin Goldstein was started on Bactrim and Keflex through 21. Venous ultrasound negative for DVT, left foot Xray demonstrated no destructive bony abnormality identified.  The patient has significant underlying medical conditions as below.      Wound Pain Timing/Severity: none  Quality of pain: N/A  Severity of pain:  0 / 10   Modifying Factors: diabetes, poor glucose control, chronic pressure, decreased mobility, shear force, obesity and anticoagulation therapy  Associated Signs/Symptoms: edema, erythema, drainage and odor     Diabetes: Yes, on Trulicity and insulin regimen, last A1c 8.6 21  Smoking:No, former smoker  Obesity: Yes  Anticoagulant therapy: Xarelto and aspirin  Immunosuppression:No  Other History: Scheduled for cardiac cath this week. 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  Recent history 21 of necrotizing fascitis groin.     Patient educated on the 6 essential components necessary for wound healing: Circulation, Debridements, Proper Dressings and Topical Wound Products, Infection Control, Edema Control and Offloading.     Patient educated on those factors that negatively effect or impact wound healing: smoking, obesity, uncontrolled diabetes, anticoagulant and immunosuppressive regimens, inadequate nutrition, untreated arterial and venous disease if applicable and measures to manage edema.         PAST MEDICAL HISTORY        Diagnosis Date    Abscess 2010    scrotal    Acute renal failure (ARF) ([address] 8/4/2019    Anxiety associated with depression     Anxiety associated with depression     Back pain 7/2/2012    Chest pain 5/1/2013    Diabetic nephropathy ([address]     Diabetic neuropathy ([address] 12/22/2011    Diabetic ulcer of left midfoot associated with type 2 diabetes mellitus, with fat layer exposed ([address] 7/18/2017    Diverticulosis     C scope + Dr. Randi Low DM (diabetes mellitus), type 2 ([address] 2002    DR. 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 Randi Low Necrotizing fasciitis [facility])     Nose fracture 1988    Panic attacks     Pericarditis 2003    Hospitalized with s/p heart cath normal    Peripheral autonomic neuropathy due to diabetes mellitus ([address]     Axonal EMG- [facility], March 2011    Sebaceous cyst 9/1/2011    URI (upper respiratory infection) 2/27/2012    WD-Wound, surgical, infected, initial encounter 12/8/2017    Wrist fracture 1986       PAST SURGICAL HISTORY    Past Surgical History:   Procedure Laterality Date   [address] 2003, 2013    Normal (Dr. Cooley Hidden)    COLONOSCOPY  6/2/2011    Pandiverticulosis, Nonbleeding internal hemorrhoids, Repeat colonoscopy at age 48- Dr. Karena Knutson    \"had reconstruction surgery on nose a year after the other nose surgery\"    OTHER SURGICAL HISTORY Right 05/22/2015    I & D right great toe with partial amputation    OTHER SURGICAL HISTORY  12/04/2017    inc and drainage of abcess    DC DEEP INCIS FOOT BONE INFECTN Left 9/22/2018    LEFT FOOT DEBRIDEMENT INCISION AND DRAINAGE TOP AND BOTTOM performed by Farhana Pink DPM at [facility] N/A 5/15/2021    SCROTAL AND PERINEAL DEBRIDEMENT performed by Tayler De La Rosa MD at [facility] N/A 5/16/2021    SCROTAL RE-DEBRIDEMENT  INCISION AND DRAINAGE performed by Tayler De La Rosa MD at [address] [place] [address] [id number]    sebaceous cyst removal times 4     TOE AMPUTATION      right great toe    TOE AMPUTATION Right 3/23/2019    TOE AMPUTATION RIGHT 5TH TOE performed by Farhana Pink DPM at [facility] TOE AMPUTATION Right 8/6/2019    TOE AMPUTATION RIGHT 4TH TOE performed by Farhana Pink DPM at [address] [place] [address] UPPER GASTROINTESTINAL ENDOSCOPY  06/2/2011    Mild gastritis with moderatley severe antritis, small hiatal hernia, Dr. Evelyn Rossi 1/12/2019    EGD BIOPSY performed by Iwona Cardona MD at [place] [address] History   Problem Relation Age of Onset   [facility] Cancer Mother         ?site   [facility] Stroke Mother     Bleeding Prob Mother     Diabetes Father     Heart Disease Father     High Blood Pressure Father     Obesity Father     Kidney Disease Father     Diabetes Sister     High Blood Pressure Sister     Mental Illness Sister     Obesity Sister     High Blood Pressure Other     Mental Illness Other         bipolar    Other Son         cyst in ear canal    ADHD Daughter        SOCIAL HISTORY    Social History     Tobacco Use    Smoking status: Former Smoker     Years: 20.00     Quit date: 11/18/2017     Years since quitting: 3.9    Smokeless tobacco: Never Used   Vaping Use    Vaping Use: Never used   Substance Use Topics    Alcohol use: Yes     Comment: occ    Drug use: Yes     Frequency: 7.0 times per week     Types: Marijuana       ALLERGIES    Allergies   Allergen Reactions    Adhesive Tape Rash    Doxycycline Nausea And Vomiting    Reglan [Metoclopramide] Anxiety       MEDICATIONS    Current Outpatient Medications on File Prior to Encounter   Medication Sig Dispense Refill    apixaban (ELIQUIS) 2.5 MG TABS tablet Take 1 tablet by mouth 2 times daily 60 tablet 5    clopidogrel (PLAVIX) 75 MG tablet Take 1 tablet by mouth daily 30 tablet 3    insulin glargine (LANTUS) 100 UNIT/ML injection vial Inject 75 Units into the skin nightly      insulin aspart (NOVOLOG RELION) 100 UNIT/ML injection vial Inject 35 Units into the skin 3 times daily (before meals)      atorvastatin (LIPITOR) 40 MG tablet Take 1 tablet by mouth nightly 30 tablet 3    metoprolol tartrate (LOPRESSOR) 25 MG tablet Take 1 tablet by mouth 2 times daily 60 tablet 3    amLODIPine (NORVASC) 10 MG tablet Take 1 tablet by mouth daily 30 tablet 3    chlorthalidone (HYGROTON) 25 MG tablet Take 1 tablet by mouth daily 30 tablet 3    sertraline (ZOLOFT) 50 MG tablet Take 2 tablets by mouth daily 30 tablet 3    aspirin 81 MG EC tablet Take 1 tablet by mouth daily 30 tablet 3    linagliptin (TRADJENTA) 5 MG tablet Take 1 tablet by mouth daily 30 tablet 5    ondansetron (ZOFRAN) 4 MG tablet Take 1 tablet by mouth every 8 hours as needed for Nausea or Vomiting 20 tablet 0    Lancets MISC 1 each by Does not apply route daily 100 each 3    furosemide (LASIX) 40 MG tablet Take 1 tablet by mouth daily 30 tablet 1    glucose monitoring kit (FREESTYLE) monitoring kit 1 each by Does not apply route once for 1 dose. 1 kit 0     No current facility-administered medications on file prior to encounter. REVIEW OF SYSTEMS    Pertinent items are noted in HPI. Constitutional: Negative for systemic symptoms including fever, chills and malaise. Objective:      /75   Pulse 82   Temp 97.7 °F (36.5 °C) (Temporal)   Resp 18     PHYSICAL EXAM    General: The patient is in no acute distress.     Mental status:  Patient is appropriate, is  oriented to place and plan of care. Dermatologic exam: Visual inspection of the periwound reveals the skin to be coarse and callus  Wound exam: see wound description below in procedure note      Assessment:     Problem List Items Addressed This Visit     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 type 2 diabetes mellitus, with muscle involvement without evidence of necrosis ([address] - Primary    Relevant Medications    lidocaine (XYLOCAINE) 5 % ointment (Start on 10/26/2021  3:45 PM)    gentamicin (GARAMYCIN) 0.1 % ointment (Start on 10/26/2021  4:00 PM)    Other Relevant Orders    Initiate Outpatient Wound Care Protocol        Procedure Note    Indications:  Based on my examination of this patient's wound(s) today, sharp excision into necrotic subcutaneous tissue is required to promote healing and evaluate the extent of previous healing. Performed by: APPLE Jaimes - CNP    Consent obtained: Yes    Time out taken:  Yes    Pain Control: Anesthetic  Anesthetic: 5% Lidocaine Ointment Topical     Debridement:Excisional Debridement    Using curette the wound(s) was/were sharply debrided down through and including the removal of subcutaneous tissue.         Devitalized Tissue Debrided:  slough and exudate    Pre Debridement Measurements:  Are located in the Wound Documentation Flow Sheet    All active wounds listed below with today's date are evaluated  Wound(s)    debrided this date include # : 1     Post  Debridement Measurements:  Wound 09/21/21 Foot Left;Plantar #1 (Active)   Wound Image   10/19/21 1457   Wound Etiology Pressure Stage  2 10/26/21 1539   Dressing Status New dressing applied 10/26/21 1539   Wound Cleansed Soap and water 10/26/21 1521   Offloading for Diabetic Foot Ulcers Forefoot offloading shoe 10/26/21 1539   Wound Length (cm) 0.5 cm 10/26/21 1521   Wound Width (cm) 0.3 cm 10/26/21 1521   Wound Depth (cm) 0.3 cm 10/26/21 1521   Wound Surface Area (cm^2) 0.15 cm^2 10/26/21 1521   Change in Wound Size % (l*w) 98.5 10/26/21 1521   Wound Volume (cm^3) 0.045 cm^3 10/26/21 1521   Wound Healing % 100 10/26/21 1521   Post-Procedure Length (cm) 0.5 cm 10/26/21 1527   Post-Procedure Width (cm) 0.3 cm 10/26/21 1527   Post-Procedure Depth (cm) 0.3 cm 10/26/21 1527   Post-Procedure Surface Area (cm^2) 0.15 cm^2 10/26/21 1527   Post-Procedure Volume (cm^3) 0.045 cm^3 10/26/21 1527   Distance Tunneling (cm) 0 cm 10/26/21 1521   Tunneling Position ___ O'Clock 0 10/26/21 1521   Undermining Starts ___ O'Clock 0 10/26/21 1521   Undermining Ends___ O'Clock 0 10/26/21 1521   Undermining Maxium Distance (cm) 0 10/26/21 1521   Wound Assessment Pink/red 10/26/21 1521   Drainage Amount None 10/26/21 1521   Drainage Description Serosanguinous 10/26/21 1521   Odor None 10/26/21 1521   Shazia-wound Assessment Hyperkeratosis (callous) 10/26/21 1521   Margins Attached edges 10/26/21 1521   Wound Thickness Description not for Pressure Injury Full thickness 10/26/21 1521   Number of days: 35       Percent of Wound(s) Debrided: approximately 100%    Total  Area  Debrided:  0.15 sq cm     Bleeding:  Minimal    Hemostasis Achieved:  by pressure    Procedural Pain:  0  / 10     Post Procedural Pain:  0 / 10     Response to treatment:  Well tolerated by patient. Status of wound progress and description from last visit: Stable, increase compression. Plan:       Discharge Instructions       PHYSICIAN ORDERS AND DISCHARGE INSTRUCTIONS     NOTE: Upon discharge from the [address], you will receive a patient experience survey.  We would be grateful if you would take the time to fill this survey out.     Wound care order history:                 BAIRON's   Right   1.38    Left  0.94           Date 9/21/2021              Vascular studies:   Date               Imaging:   Date               Cultures:   Date obtained on 9/21/2021              Grafts:  Date               Antibiotics:               Earlier Wound care treatments:                          Primary care physician     Continuing wound care orders and information:              Residence:  Private               Continue [facility] health care with: [facility]     Wound Medications:              WAPLH cleansing:                           VB not scrub or use excessive force.                          Wash hands with soap and water before and after dressing changes.                         Prior to applying a clean dressing, cleanse wound with normal saline,                                wound cleanser, or mild soap and water.                           Ask the physician or nurse before getting the wound(s) wet in a shower              Daily Wound management:                          Keep weight off wounds and reposition every 2 hours.                          EPOTO standing for long periods of time.                          ULWBF wraps/stockings in AM and remove at bedtime.                          If swelling is present, elevate legs to the level of the heart or above for 30 minutes 4-5 times a day and/or when sitting.                                             When taking antibiotics take entire prescription as ordered by physician do not stop taking until medicine is all gone.                                                         Orders for this week: 10/26/2021                   Left Plantar Foot -- wash with soap and water, pat dry. Foam to medial leg. Apply double layer of Ag Ca Alginate to periwound with steri strips to hold in place to offload. Gentamicin and stimulin powder to wound bed. Cover with ABD pad. Wrap with Coban 2.     HHC to change on Friday may use Unna boot until Coban 2 is available.         Rx: [facility] [employer]  Consult:  Central [phone]     Follow up with Sherri Iqbal CNP in 1 week in the wound care center  Call [phone] for any questions or concerns.   Date__________   Time____________        Treatment Note Wound 21 Foot Left;Plantar #1-Dressing/Treatment:  (Gentamicin, stimulen, ag ca alg, ABD, Coban 2 Lite)    Written Patient Dismissal Instructions Given            Electronically signed by APPLE Alcazar CNP on 10/26/2021 at 3:43 PM

## 2021-10-26 NOTE — PROGRESS NOTES
Multilayer Compression Wrap   (Not Unna) Below the Knee    NAME:  Marlyn Archuleta OF BIRTH:  1975  MEDICAL RECORD NUMBER:  [id number]  DATE:  10/26/2021    Multilayer compression wrap: Removed old Multilayer wrap if indicated and wash leg with mild soap/water. Applied moisturizing agent to dry skin as needed. Applied primary and secondary dressing as ordered. Applied multilayered dressing below the knee to left lower leg. Instructed patient/caregiver not to remove dressing and to keep it clean and dry. Instructed patient/caregiver on complications to report to provider, such as pain, numbness in toes, heavy drainage, and slippage of dressing. Instructed patient on purpose of compression dressing and on activity and exercise recommendations.       Electronically signed by Aixa Goldstein LPN on 51/71/0424 at 3:41 PM

## 2021-11-02 ENCOUNTER — HOSPITAL ENCOUNTER (OUTPATIENT)
Dept: WOUND CARE | Age: 46
Discharge: HOME | End: 2021-11-02
Payer: MEDICARE

## 2021-11-02 VITALS
DIASTOLIC BLOOD PRESSURE: 80 MMHG | SYSTOLIC BLOOD PRESSURE: 141 MMHG | HEART RATE: 86 BPM | RESPIRATION RATE: 18 BRPM | TEMPERATURE: 98.6 F

## 2021-11-02 DIAGNOSIS — L97.425 DIABETIC ULCER OF LEFT MIDFOOT ASSOCIATED WITH TYPE 2 DIABETES MELLITUS, WITH MUSCLE INVOLVEMENT WITHOUT EVIDENCE OF NECROSIS (HCC): Primary | ICD-10-CM

## 2021-11-02 DIAGNOSIS — E11.621 DIABETIC ULCER OF LEFT MIDFOOT ASSOCIATED WITH TYPE 2 DIABETES MELLITUS, WITH MUSCLE INVOLVEMENT WITHOUT EVIDENCE OF NECROSIS (HCC): Primary | ICD-10-CM

## 2021-11-02 PROCEDURE — 11042 DBRDMT SUBQ TIS 1ST 20SQCM/<: CPT

## 2021-11-02 PROCEDURE — 11042 DBRDMT SUBQ TIS 1ST 20SQCM/<: CPT | Performed by: NURSE PRACTITIONER

## 2021-11-02 RX ORDER — LIDOCAINE 50 MG/G
OINTMENT TOPICAL ONCE
Status: CANCELLED | OUTPATIENT
Start: 2021-11-02 | End: 2021-11-02

## 2021-11-02 RX ORDER — GENTAMICIN SULFATE 1 MG/G
OINTMENT TOPICAL ONCE
Status: CANCELLED | OUTPATIENT
Start: 2021-11-02 | End: 2021-11-02

## 2021-11-02 RX ORDER — LIDOCAINE 50 MG/G
OINTMENT TOPICAL ONCE
Status: DISCONTINUED | OUTPATIENT
Start: 2021-11-02 | End: 2021-11-03 | Stop reason: HOSPADM

## 2021-11-02 RX ORDER — CLOBETASOL PROPIONATE 0.5 MG/G
OINTMENT TOPICAL ONCE
Status: CANCELLED | OUTPATIENT
Start: 2021-11-02 | End: 2021-11-02

## 2021-11-02 RX ORDER — GENTAMICIN SULFATE 1 MG/G
OINTMENT TOPICAL ONCE
Status: DISCONTINUED | OUTPATIENT
Start: 2021-11-02 | End: 2021-11-03 | Stop reason: HOSPADM

## 2021-11-02 RX ORDER — BACITRACIN, NEOMYCIN, POLYMYXIN B 400; 3.5; 5 [USP'U]/G; MG/G; [USP'U]/G
OINTMENT TOPICAL ONCE
Status: CANCELLED | OUTPATIENT
Start: 2021-11-02 | End: 2021-11-02

## 2021-11-02 RX ORDER — GINSENG 100 MG
CAPSULE ORAL ONCE
Status: CANCELLED | OUTPATIENT
Start: 2021-11-02 | End: 2021-11-02

## 2021-11-02 RX ORDER — BACITRACIN ZINC AND POLYMYXIN B SULFATE 500; 1000 [USP'U]/G; [USP'U]/G
OINTMENT TOPICAL ONCE
Status: CANCELLED | OUTPATIENT
Start: 2021-11-02 | End: 2021-11-02

## 2021-11-02 RX ORDER — BETAMETHASONE DIPROPIONATE 0.05 %
OINTMENT (GRAM) TOPICAL ONCE
Status: CANCELLED | OUTPATIENT
Start: 2021-11-02 | End: 2021-11-02

## 2021-11-02 RX ORDER — LIDOCAINE HYDROCHLORIDE 40 MG/ML
SOLUTION TOPICAL ONCE
Status: CANCELLED | OUTPATIENT
Start: 2021-11-02 | End: 2021-11-02

## 2021-11-02 RX ORDER — LIDOCAINE 40 MG/G
CREAM TOPICAL ONCE
Status: CANCELLED | OUTPATIENT
Start: 2021-11-02 | End: 2021-11-02

## 2021-11-02 ASSESSMENT — PAIN DESCRIPTION - LOCATION: LOCATION: FOOT

## 2021-11-02 ASSESSMENT — PAIN DESCRIPTION - ONSET: ONSET: ON-GOING

## 2021-11-02 ASSESSMENT — PAIN DESCRIPTION - PAIN TYPE: TYPE: ACUTE PAIN

## 2021-11-02 ASSESSMENT — PAIN DESCRIPTION - PROGRESSION: CLINICAL_PROGRESSION: NOT CHANGED

## 2021-11-02 ASSESSMENT — PAIN DESCRIPTION - FREQUENCY: FREQUENCY: CONTINUOUS

## 2021-11-02 ASSESSMENT — PAIN DESCRIPTION - ORIENTATION: ORIENTATION: LEFT

## 2021-11-02 ASSESSMENT — PAIN SCALES - GENERAL: PAINLEVEL_OUTOF10: 5

## 2021-11-02 ASSESSMENT — PAIN DESCRIPTION - DESCRIPTORS: DESCRIPTORS: CONSTANT;STABBING

## 2021-11-02 NOTE — PROGRESS NOTES
Wound Care Center Progress Note With Procedure    Kelli Castañeda  AGE: 55 y.o. GENDER: male  : 1975  EPISODE DATE:  2021     Subjective:     Chief Complaint   Patient presents with    Wound Check     left foot          HISTORY of PRESENT ILLNESS     Bill Farias a 55 y. o. 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  The condition is of moderate severity. The ulcer has been present for approximately 2 years. [place] underlying cause is thought to be diabetes and pressure. The patients care to date has included routine care with debridement per Dr. Yan Vivas. His insurance has recently changed and hasn't seen Dr. Heredia Husbands for 2 months. He was evaluated at the [facility] 21 with chest pain and diabetic left foot ulcer. [facility] was started on Bactrim and Keflex through 21. Venous ultrasound negative for DVT, left foot Xray demonstrated no destructive bony abnormality identified.  The patient has significant underlying medical conditions as below.      Wound Pain Timing/Severity: none  Quality of pain: N/A  Severity of pain:  0 / 10   Modifying Factors: diabetes, poor glucose control, chronic pressure, decreased mobility, shear force, obesity and anticoagulation therapy  Associated Signs/Symptoms: edema, erythema, drainage and odor     Diabetes: Yes, on Trulicity and insulin regimen, last A1c 8.6 21  Smoking:No, former smoker  Obesity: Yes  Anticoagulant therapy: Xarelto and aspirin  Immunosuppression:No  Other History: Scheduled for cardiac cath this week. 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  Recent history 21 of necrotizing fascitis groin.     Patient educated on the 6 essential components necessary for wound healing: Circulation, Debridements, Proper Dressings and Topical Wound Products, Infection Control, Edema Control and Offloading.     Patient educated on those factors that negatively effect or impact wound healing: smoking, obesity, uncontrolled diabetes, anticoagulant and immunosuppressive regimens, inadequate nutrition, untreated arterial and venous disease if applicable and measures to manage edema.         PAST MEDICAL HISTORY        Diagnosis Date    Abscess 2010    scrotal    Acute renal failure (ARF) ([address] 08/04/2019    Anxiety associated with depression     Anxiety associated with depression     Back pain 07/02/2012    Chest pain 05/01/2013    Diabetic nephropathy ([address]     Diabetic neuropathy ([address] 12/22/2011    Diabetic ulcer of left midfoot associated with type 2 diabetes mellitus, with fat layer exposed ([address] 07/18/2017    Diverticulosis     C scope + Dr. Kwame Cohen DM (diabetes mellitus), type 2 ([address] 2002    DR. 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 Kwame Cohen Necrotizing fasciitis [facility])     Nose fracture 1988    Panic attacks     Pericarditis 2003    Hospitalized with s/p heart cath normal    Peripheral autonomic neuropathy due to diabetes mellitus ([address]     Axonal EMG- NCS, March 2011    Sebaceous cyst 09/01/2011    URI (upper respiratory infection) 02/27/2012    WD-Wound, surgical, infected, initial encounter 12/08/2017    Wrist fracture 1986       PAST SURGICAL HISTORY    Past Surgical History:   Procedure Laterality Date   [address] 2003, 2013    Normal (Dr. Jennifer Nguyen)    COLONOSCOPY  6/2/2011    Pandiverticulosis, Nonbleeding internal hemorrhoids, Repeat colonoscopy at age 48- Dr. Henry Stevenson    \"had reconstruction surgery on nose a year after the other nose surgery\"    OTHER SURGICAL HISTORY Right 05/22/2015    I & D right great toe with partial amputation    OTHER SURGICAL HISTORY  12/04/2017    inc and drainage of abcess    [place] DEEP INCIS FOOT BONE INFECTN Left 9/22/2018    LEFT FOOT DEBRIDEMENT INCISION AND DRAINAGE TOP AND BOTTOM performed by Francois Arriaga DPM at [facility] N/A 5/15/2021    SCROTAL AND PERINEAL DEBRIDEMENT performed by Tulio Kwan MD at [facility] N/A 5/16/2021    SCROTAL RE-DEBRIDEMENT  INCISION AND DRAINAGE performed by Tulio Kwan MD at [address] [place] [address] [id number]    sebaceous cyst removal times 4     TOE AMPUTATION      right great toe    TOE AMPUTATION Right 3/23/2019    TOE AMPUTATION RIGHT 5TH TOE performed by Francois Arriaga DPM at [place] [address] TOE AMPUTATION Right 8/6/2019    TOE AMPUTATION RIGHT 4TH TOE performed by Francois Arriaga DPM at [address] [place] [address] UPPER GASTROINTESTINAL ENDOSCOPY  06/2/2011    Mild gastritis with moderatley severe antritis, small hiatal hernia, Dr. Ines Chowdary N/A 1/12/2019    EGD BIOPSY performed by Jalil Bragg MD at [facility]    Family History   Problem Relation Age of Onset    Cancer Mother         ?site   [de-identified] Stroke Mother     Bleeding Prob Mother     Diabetes Father     Heart Disease Father     High Blood Pressure Father     Obesity Father     Kidney Disease Father     Diabetes Sister     High Blood Pressure Sister     Mental Illness Sister     Obesity Sister     High Blood Pressure Other     Mental Illness Other         bipolar    Other Son         cyst in ear canal    ADHD Daughter        SOCIAL HISTORY    Social History     Tobacco Use    Smoking status: Former Smoker     Years: 20.00     Quit date: 11/18/2017     Years since quitting: 3.9    Smokeless tobacco: Never Used   Vaping Use    Vaping Use: Never used   Substance Use Topics    Alcohol use: Yes     Comment: occ    Drug use:  Yes Frequency: 7.0 times per week     Types: Marijuana (Weed)       ALLERGIES    Allergies   Allergen Reactions    Adhesive Tape Rash    Doxycycline Nausea And Vomiting    Reglan [Metoclopramide] Anxiety       MEDICATIONS    Current Outpatient Medications on File Prior to Encounter   Medication Sig Dispense Refill    apixaban (ELIQUIS) 2.5 MG TABS tablet Take 1 tablet by mouth 2 times daily 60 tablet 5    clopidogrel (PLAVIX) 75 MG tablet Take 1 tablet by mouth daily 30 tablet 3    insulin glargine (LANTUS) 100 UNIT/ML injection vial Inject 75 Units into the skin nightly      insulin aspart (NOVOLOG RELION) 100 UNIT/ML injection vial Inject 35 Units into the skin 3 times daily (before meals)      atorvastatin (LIPITOR) 40 MG tablet Take 1 tablet by mouth nightly 30 tablet 3    metoprolol tartrate (LOPRESSOR) 25 MG tablet Take 1 tablet by mouth 2 times daily 60 tablet 3    amLODIPine (NORVASC) 10 MG tablet Take 1 tablet by mouth daily 30 tablet 3    chlorthalidone (HYGROTON) 25 MG tablet Take 1 tablet by mouth daily 30 tablet 3    sertraline (ZOLOFT) 50 MG tablet Take 2 tablets by mouth daily 30 tablet 3    aspirin 81 MG EC tablet Take 1 tablet by mouth daily 30 tablet 3    linagliptin (TRADJENTA) 5 MG tablet Take 1 tablet by mouth daily 30 tablet 5    Lancets MISC 1 each by Does not apply route daily 100 each 3    furosemide (LASIX) 40 MG tablet Take 1 tablet by mouth daily 30 tablet 1    ondansetron (ZOFRAN) 4 MG tablet Take 1 tablet by mouth every 8 hours as needed for Nausea or Vomiting 20 tablet 0    glucose monitoring kit (FREESTYLE) monitoring kit 1 each by Does not apply route once for 1 dose. 1 kit 0     No current facility-administered medications on file prior to encounter. REVIEW OF SYSTEMS    Pertinent items are noted in HPI. Constitutional: Negative for systemic symptoms including fever, chills and malaise.       Objective:      BP (!) 141/80   Pulse 86   Temp 98.6 °F (37 °C) cm 11/02/21 1400   Wound Surface Area (cm^2) 0.64 cm^2 11/02/21 1400   Change in Wound Size % (l*w) 93.6 11/02/21 1400   Wound Volume (cm^3) 0.192 cm^3 11/02/21 1400   Wound Healing % 98 11/02/21 1400   Post-Procedure Length (cm) 0.8 cm 11/02/21 1429   Post-Procedure Width (cm) 0.8 cm 11/02/21 1429   Post-Procedure Depth (cm) 0.3 cm 11/02/21 1429   Post-Procedure Surface Area (cm^2) 0.64 cm^2 11/02/21 1429   Post-Procedure Volume (cm^3) 0.192 cm^3 11/02/21 1429   Distance Tunneling (cm) 0 cm 11/02/21 1400   Tunneling Position ___ O'Clock 0 11/02/21 1400   Undermining Starts ___ O'Clock 0 11/02/21 1400   Undermining Ends___ O'Clock 0 11/02/21 1400   Undermining Maxium Distance (cm) 0 11/02/21 1400   Wound Assessment Pink/red 11/02/21 1400   Drainage Amount None 11/02/21 1400   Drainage Description Serosanguinous 11/02/21 1400   Odor None 11/02/21 1400   Shazia-wound Assessment Hyperkeratosis (callous) 11/02/21 1400   Margins Attached edges 11/02/21 1400   Wound Thickness Description not for Pressure Injury Full thickness 11/02/21 1400   Number of days: 42       Percent of Wound(s) Debrided: approximately 100%    Total  Area  Debrided: 0.64 sq cm     Bleeding:  Minimal    Hemostasis Achieved:  by pressure    Procedural Pain:  0  / 10     Post Procedural Pain:  0 / 10     Response to treatment:  Well tolerated by patient. Status of wound progress and description from last visit: Stable, continue regimen. Plan:       Discharge Instructions         Discharge Instructions        PHYSICIAN ORDERS AND DISCHARGE INSTRUCTIONS     NOTE: Upon discharge from the [address], you will receive a patient experience survey.  We would be grateful if you would take the time to fill this survey out.     Wound care order history:                 BAIRON's   Right   1.38    Left  0.94           Date 9/21/2021              Vascular studies:   Date               Imaging:   Date               Cultures:   Date obtained on 2021              Grafts:  Date               Antibiotics:               Earlier Wound care treatments:                          Primary care physician     Continuing wound care orders and information:              Residence:  Private               Continue home health care with: [facility]     Wound Medications:              RBHMG cleansing:                           MZ not scrub or use excessive force.                          Wash hands with soap and water before and after dressing changes.                         Prior to applying a clean dressing, cleanse wound with normal saline,                                wound cleanser, or mild soap and water.                           Ask the physician or nurse before getting the wound(s) wet in a shower              Daily Wound management:                          Keep weight off wounds and reposition every 2 hours.                          KHTRV standing for long periods of time.                          FAHTK wraps/stockings in AM and remove at bedtime.                          If swelling is present, elevate legs to the level of the heart or above for 30 minutes 4-5 times a day and/or when sitting.                                             When taking antibiotics take entire prescription as ordered by physician do not stop taking until medicine is all gone.                                                          Orders for this week: 2021                   Left Plantar Foot -- wash with soap and water, pat dry. Foam to medial leg. Apply triple  layer of Ag Ca Alginate to periwound with steri strips to hold in place to offload. Gentamicin and stimulin powder to wound bed. Cover with ABD pad. Wrap with Coban 2.     [facility] to change on Friday may use Unna boot until Coban 2 is available.      Right leg--- wrap with coban 2         Rx: [facility] on [employer]  Consult:  Central [phone]     Follow up with Zeke Green CNP in 1 week in the wound [facility] center  Call [phone] for any questions or concerns.   Date__________   Time____________          Treatment Note Wound 09/21/21 Foot Left;Plantar #1-Dressing/Treatment:  (ca+ alginate gentamicin stimulin ca+ alginate  abd coban2)    Written Patient Dismissal Instructions Given            Electronically signed by APPLE Millan CNP on 11/2/2021 at 6:00 PM

## 2021-11-02 NOTE — DISCHARGE INSTR - COC
Continuity of Care Form    Patient Name: Marylee Shines   :  1975  MRN:  [id number]    Admit date:  (Not on file)  Discharge date:  ***    Code Status Order: Prior   Advance Directives:     Admitting Physician:  No admitting provider for patient encounter. PCP: Priya Livingston MD    Discharging Nurse: [facility] Unit/Room#: No information available for this encounter.   Discharging Unit Phone Number: ***    Emergency Contact:   Extended Emergency Contact Information  Primary Emergency Contact: [facility]  Address: [address] [place] [address] Lali Bermudez [facility] Maria Guadalupe Person of [address] [place] [address] Phone: [phone]  Mobile Phone: [phone]  Relation: Spouse  Secondary Emergency Contact: Elaine Mcleod, [address] [facility]  Home Phone: [phone]  Mobile Phone: [phone]  Relation: Parent    Past Surgical History:  Past Surgical History:   Procedure Laterality Date   [address],     Normal (Dr. Bonnie Cabrera)    COLONOSCOPY  2011    Pandiverticulosis, Nonbleeding internal hemorrhoids, Repeat colonoscopy at age 48- Dr. Cesario Gomes    \"had reconstruction surgery on nose a year after the other nose surgery\"    OTHER SURGICAL HISTORY Right 2015    I & D right great toe with partial amputation    OTHER SURGICAL HISTORY  2017    inc and drainage of abcess    [place] DEEP INCIS FOOT BONE INFECTN Left 2018    LEFT FOOT DEBRIDEMENT INCISION AND DRAINAGE TOP AND BOTTOM performed by Serena Escamilla DPM at [facility] N/A 5/15/2021    SCROTAL AND PERINEAL DEBRIDEMENT performed by Bjorn Davis MD at [address] [place] [address] 2021    SCROTAL RE-DEBRIDEMENT  INCISION AND DRAINAGE performed by Bjorn Davis MD at [address] [place] [address] [phone]    sebaceous cyst removal times 4     TOE AMPUTATION      right great toe    TOE AMPUTATION Right 3/23/2019    TOE AMPUTATION RIGHT 5TH TOE performed by Migue Chaparro DPM at [facility] TOE AMPUTATION Right 8/6/2019    TOE AMPUTATION RIGHT 4TH TOE performed by Migue Chaparro DPM at [address] [place] [address] UPPER GASTROINTESTINAL ENDOSCOPY  06/2/2011    Mild gastritis with moderatley severe antritis, small hiatal hernia, Dr. Bora Poole 1/12/2019    EGD BIOPSY performed by Josey Santana MD at [facility] ENDOSCOPY       Immunization History:   Immunization History   Administered Date(s) Administered    Influenza 10/15/2013    Influenza, Jamas Jointer, 6 mo and older, IM, PF (Flulaval, Fluarix) 09/19/2018    Pneumococcal Polysaccharide ([id number]) 05/02/2013       Active Problems:  Patient Active Problem List   Diagnosis Code    Hiatal hernia K44.9    Diabetes mellitus type 2, improved controlled E11.65    Diabetic neuropathy (HCC) E11.40    Panic attack F41.0    Anxiety associated with depression F41.8    Neck pain M54.2    DANIELA (obstructive sleep apnea) G47.33    Urinary frequency R35.0    Bilateral carpal tunnel syndrome- left worse than right G56.03    Hyperlipidemia with target LDL less than 100 E78.5    Essential hypertension I10    Bronchitis J40    Osteomyelitis ([address] M86.9    Abscess D16.51    Periumbilical hernia U38.3    Sepsis ([address] A41.9    Cellulitis of left foot L03. 116    Constipation K59.00    Intractable nausea and vomiting R11.2    Uncontrolled type 2 diabetes mellitus (HCC) E11.65    Nausea and vomiting R11.2    Diabetic foot infection ([facility]) E11.628, L08.9    Acute renal failure (ARF) ([facility]) N17.9    Cellulitis L03.90    Cellulitis of left arm L03.114    Cellulitis, scrotum N49.2    Acute epididymitis N45.1    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    Recurrent major depressive disorder, in full remission ([address] F33.42    Generalized anxiety disorder F41.1    Morbid obesity with body mass index (BMI) of 40.0 to 44.9 in adult ([facility]) E66.01, Z68.41    Necrotizing fasciitis ([facility] M72.6    Syncope and collapse R55    Right groin wound S31.109A    Ulcer of right groin with fat layer exposed ([facility] L98.492    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 type 2 diabetes mellitus, with muscle involvement without evidence of necrosis ([facility]) E11.621, L97.425    Nephrotic syndrome N04.9    Generalized edema R60.1    Stage 3b chronic kidney disease (HCC) N18.32    Diabetic nephropathy associated with type 2 diabetes mellitus ([facility]) E11.21    Hypoalbuminemia E88.09    Chronic kidney disease, stage IV (severe) ([facility]) N18.4    FH: CAD (coronary artery disease) Z82.49    ASCVD (arteriosclerotic cardiovascular disease) I25.10    Other proteinuria R80.8       Isolation/Infection:   Isolation          No Isolation        Patient Infection Status     Infection Onset Added Last Indicated Last Indicated By Review Planned Expiration Resolved Resolved By    None active    Resolved    COVID-19 Rule Out 21 COVID-19, Rapid (Ordered)   21 Rule-Out Test Resulted          Nurse Assessment:  Last Vital Signs: There were no vitals taken for this visit.     Last documented pain score (0-10 scale):    Last Weight:   Wt Readings from Last 1 Encounters:   10/27/21 290 lb (131.5 kg)     Mental Status:  {IP PT MENTAL STATUS:73501}    IV Access:  { GIUSEPPE IV ACCESS:227717120}    Nursing Mobility/ADLs:  Walking   {[facility] DME AANQ:187051508}  Transfer  {[facility] DME JAS}  Bathing  {[facility] DME BFXK:263563374}  Dressing  {[facility] DME RRQL:092799377}  Toileting  {[facility] DME OCEC:281406859}  Feeding  {[facility] DME YOID:300551453}  Med Admin  {[facility] DME WHFX:969543176}  Med Delivery   { GIUSEPPE MED Delivery:995301240}    Wound Care Documentation and Therapy:  Wound 17 Other (Comment) Foot Left (Active)   Number of days: 3241       Wound 17 #3 abdoment (onset 3 days ago) SURGICAL (Active)   Number of days: 1425       Wound 17 #4 Lt plantar (onset 6 months ago) DIABETIC ALCALA 1 (Active)   Number of days: 1351       Wound 05/18/18 # 5 LEFT PLANTAR (ONSET 1 YEAR AGO) DM WAG 1 (Active)   Number of days: 8181       Wound 09/17/18 Left dorsal foot and 4th toe amp site 9/19/18 (Active)   Number of days: 1141       Incision 05/22/15 Foot Right (Active)   Number of days: 8585       Incision 12/04/17 Abdomen Mid (Active)   Number of days: 4737       Incision 09/22/18 Foot Left (Active)   Number of days: 5612       Wound 01/11/19 left plantar foot wound (Active)   Number of days: 1026       Wound 03/21/19 Toe (Comment  which one) Anterior;Right (Active)   Number of days: 957       Wound 03/21/19 Foot Left;Posterior hard callus area (Active)   Number of days: 957       Wound 03/24/21 Left;Plantar (Active)   Number of days: 222       Wound 05/13/21 Left;Plantar (Active)   Number of days: 172       Wound 05/17/21 Groin Right scrotum extends to lower buttock (Active)   Number of days: 169       Wound 09/21/21 Foot Left;Plantar #1 (Active)   Wound Image   10/19/21 1457   Wound Etiology Pressure Stage  2 10/26/21 1539   Dressing Status New dressing applied 10/26/21 1539   Wound Cleansed Soap and water 10/26/21 1521   Offloading for Diabetic Foot Ulcers Forefoot offloading shoe 10/26/21 1539   Wound Length (cm) 0.5 cm 10/26/21 1521   Wound Width (cm) 0.3 cm 10/26/21 1521   Wound Depth (cm) 0.3 cm 10/26/21 1521   Wound Surface Area (cm^2) 0.15 cm^2 10/26/21 1521   Change in Wound Size % (l*w) 98.5 10/26/21 1521   Wound Volume (cm^3) 0.045 cm^3 10/26/21 1521   Wound Healing % 100 10/26/21 1521   Post-Procedure Length (cm) 0.5 cm 10/26/21 1527   Post-Procedure Width (cm) 0.3 cm 10/26/21 1527   Post-Procedure Depth (cm) 0.3 cm 10/26/21 1527   Post-Procedure Surface Area (cm^2) 0.15 cm^2 10/26/21 1527   Post-Procedure Volume (cm^3) 0.045 cm^3 10/26/21 1527   Distance Tunneling (cm) 0 cm 10/26/21 1521   Tunneling Position ___ O'Clock 0 10/26/21 1521   Undermining Starts ___ O'Clock 0 10/26/21 1521   Undermining Ends___ O'Clock 0 10/26/21 1521   Undermining Maxium Distance (cm) 0 10/26/21 1521   Wound Assessment Pink/red 10/26/21 1521   Drainage Amount None 10/26/21 1521   Drainage Description Serosanguinous 10/26/21 1521   Odor None 10/26/21 1521   Shazia-wound Assessment Hyperkeratosis (callous) 10/26/21 1521   Margins Attached edges 10/26/21 1521   Wound Thickness Description not for Pressure Injury Full thickness 10/26/21 1521   Number of days: 41        Elimination:  Continence:   · Bowel: {YES / BZ:69464}  · Bladder: {YES / HQ:95861}  Urinary Catheter: {Urinary Catheter:905582436}   Colostomy/Ileostomy/Ileal Conduit: {YES / SC:19845}       Date of Last BM: ***  No intake or output data in the 24 hours ending 21 1043  No intake/output data recorded.     Safety Concerns:     [address] Safety Concerns:592600401}    Impairments/Disabilities:      [address] Impairments/Disabilities:589563679}    Nutrition Therapy:  Current Nutrition Therapy:   [address] Diet List:874059223}    Routes of Feeding: {CHP DME Other Feedings:240510216}  Liquids: {Slp liquid thickness:59912}  Daily Fluid Restriction: {CHP DME Yes amt example:775486379}  Last Modified Barium Swallow with Video (Video Swallowing Test): {Done Not Done TJFS:641646965}    Treatments at the Time of Hospital Discharge:   Respiratory Treatments: ***  Oxygen Therapy:  {Therapy; copd oxygen:42142}  Ventilator:    { CC Vent NXGW:725797727}    Rehab Therapies: {THERAPEUTIC INTERVENTION:2172435208}  Weight Bearing Status/Restrictions: [address] Weight Bearin}  Other Medical Equipment (for information only, NOT a DME order):  {EQUIPMENT:177034852}  Other Treatments: ***    Patient's personal belongings (please select all that are sent with patient):  {CHP DME Belongings:301459627}    RN SIGNATURE:  {Esignature:781716213}    CASE MANAGEMENT/SOCIAL WORK SECTION    Inpatient Status Date: ***    Readmission Risk Assessment Score:  Readmission

## 2021-11-02 NOTE — PROGRESS NOTES
Multilayer Compression Wrap   (Not Unna) Below the Knee    NAME:  Maureen Clarity OF BIRTH:  1975  MEDICAL RECORD NUMBER:  [id number]  DATE:  11/2/2021    Multilayer compression wrap: Removed old Multilayer wrap if indicated and wash leg with mild soap/water. Applied moisturizing agent to dry skin as needed. Applied primary and secondary dressing as ordered. Applied multilayered dressing below the knee to right lower leg. Applied multilayered dressing below the knee to left lower leg. Instructed patient/caregiver not to remove dressing and to keep it clean and dry. Instructed patient/caregiver on complications to report to provider, such as pain, numbness in toes, heavy drainage, and slippage of dressing. Instructed patient on purpose of compression dressing and on activity and exercise recommendations.       Electronically signed by Faisal Vásquez RN on 11/2/2021 at 3:09 PM

## 2021-11-09 ENCOUNTER — HOSPITAL ENCOUNTER (OUTPATIENT)
Dept: WOUND CARE | Age: 46
Discharge: HOME | End: 2021-11-09
Payer: MEDICARE

## 2021-11-09 VITALS
SYSTOLIC BLOOD PRESSURE: 131 MMHG | RESPIRATION RATE: 18 BRPM | HEART RATE: 90 BPM | DIASTOLIC BLOOD PRESSURE: 75 MMHG | TEMPERATURE: 97.8 F

## 2021-11-09 DIAGNOSIS — L97.425 DIABETIC ULCER OF LEFT MIDFOOT ASSOCIATED WITH TYPE 2 DIABETES MELLITUS, WITH MUSCLE INVOLVEMENT WITHOUT EVIDENCE OF NECROSIS (HCC): Primary | ICD-10-CM

## 2021-11-09 DIAGNOSIS — E11.621 DIABETIC ULCER OF LEFT MIDFOOT ASSOCIATED WITH TYPE 2 DIABETES MELLITUS, WITH MUSCLE INVOLVEMENT WITHOUT EVIDENCE OF NECROSIS (HCC): Primary | ICD-10-CM

## 2021-11-09 PROCEDURE — 11042 DBRDMT SUBQ TIS 1ST 20SQCM/<: CPT | Performed by: NURSE PRACTITIONER

## 2021-11-09 PROCEDURE — 11042 DBRDMT SUBQ TIS 1ST 20SQCM/<: CPT

## 2021-11-09 RX ORDER — BACITRACIN ZINC AND POLYMYXIN B SULFATE 500; 1000 [USP'U]/G; [USP'U]/G
OINTMENT TOPICAL ONCE
Status: CANCELLED | OUTPATIENT
Start: 2021-11-09 | End: 2021-11-09

## 2021-11-09 RX ORDER — GENTAMICIN SULFATE 1 MG/G
OINTMENT TOPICAL ONCE
Status: DISCONTINUED | OUTPATIENT
Start: 2021-11-09 | End: 2021-11-10 | Stop reason: HOSPADM

## 2021-11-09 RX ORDER — LIDOCAINE 40 MG/G
CREAM TOPICAL ONCE
Status: CANCELLED | OUTPATIENT
Start: 2021-11-09 | End: 2021-11-09

## 2021-11-09 RX ORDER — GENTAMICIN SULFATE 1 MG/G
OINTMENT TOPICAL ONCE
Status: CANCELLED | OUTPATIENT
Start: 2021-11-09 | End: 2021-11-09

## 2021-11-09 RX ORDER — LIDOCAINE 50 MG/G
OINTMENT TOPICAL ONCE
Status: DISCONTINUED | OUTPATIENT
Start: 2021-11-09 | End: 2021-11-10 | Stop reason: HOSPADM

## 2021-11-09 RX ORDER — LIDOCAINE HYDROCHLORIDE 40 MG/ML
SOLUTION TOPICAL ONCE
Status: CANCELLED | OUTPATIENT
Start: 2021-11-09 | End: 2021-11-09

## 2021-11-09 RX ORDER — GINSENG 100 MG
CAPSULE ORAL ONCE
Status: CANCELLED | OUTPATIENT
Start: 2021-11-09 | End: 2021-11-09

## 2021-11-09 RX ORDER — CLOBETASOL PROPIONATE 0.5 MG/G
OINTMENT TOPICAL ONCE
Status: CANCELLED | OUTPATIENT
Start: 2021-11-09 | End: 2021-11-09

## 2021-11-09 RX ORDER — BACITRACIN, NEOMYCIN, POLYMYXIN B 400; 3.5; 5 [USP'U]/G; MG/G; [USP'U]/G
OINTMENT TOPICAL ONCE
Status: CANCELLED | OUTPATIENT
Start: 2021-11-09 | End: 2021-11-09

## 2021-11-09 RX ORDER — BETAMETHASONE DIPROPIONATE 0.05 %
OINTMENT (GRAM) TOPICAL ONCE
Status: CANCELLED | OUTPATIENT
Start: 2021-11-09 | End: 2021-11-09

## 2021-11-09 RX ORDER — LIDOCAINE 50 MG/G
OINTMENT TOPICAL ONCE
Status: CANCELLED | OUTPATIENT
Start: 2021-11-09 | End: 2021-11-09

## 2021-11-09 ASSESSMENT — PAIN DESCRIPTION - ORIENTATION: ORIENTATION: LEFT

## 2021-11-09 ASSESSMENT — PAIN DESCRIPTION - PAIN TYPE: TYPE: ACUTE PAIN

## 2021-11-09 ASSESSMENT — PAIN - FUNCTIONAL ASSESSMENT: PAIN_FUNCTIONAL_ASSESSMENT: PREVENTS OR INTERFERES SOME ACTIVE ACTIVITIES AND ADLS

## 2021-11-09 ASSESSMENT — PAIN DESCRIPTION - PROGRESSION: CLINICAL_PROGRESSION: NOT CHANGED

## 2021-11-09 ASSESSMENT — PAIN SCALES - GENERAL: PAINLEVEL_OUTOF10: 6

## 2021-11-09 ASSESSMENT — PAIN DESCRIPTION - LOCATION: LOCATION: FOOT

## 2021-11-09 ASSESSMENT — PAIN DESCRIPTION - FREQUENCY: FREQUENCY: CONTINUOUS

## 2021-11-09 ASSESSMENT — PAIN DESCRIPTION - ONSET: ONSET: ON-GOING

## 2021-11-09 NOTE — PROGRESS NOTES
Total Contact Cast    NAME:  Shruti Fatima OF BIRTH:  1975  MEDICAL RECORD NUMBER:  [id number]  DATE:  11/9/2021    Goal:  Patient will maintain integrity of cast, avoid mobility hazards, and report complications that may occur (foul odor, pain, numbness, cracked cast). Removed old contact cast if indicated and wash extremity with soap and water  Applied ordered dressing over wound(s)   Applied cast padding  Applied foam padding  Applied per Sherri Duff NP  Total Contact Cast was applied in the [address] [place] [address] Road to left lower leg  Applied cast material fiberglass  Applied cast material plaster   Allow cast to dry   Instructed patient to report to health care provider, including wound care center, any back pain, hip pain, or leg pain, numbness of toes, or any odor coming from the cast.   Instructed patient not to stick any foreign objects down into cast.  Instructed patient to utilize assistive devices(crutches, cane or walker) as ordered. Instructed patient to continue offloading as directed.      Applied cast per  Guidelines    Electronically signed by Juventino Cranker, LPN on 29/6/6243 at 8:53 PM

## 2021-11-11 ENCOUNTER — HOSPITAL ENCOUNTER (OUTPATIENT)
Dept: WOUND CARE | Age: 46
Discharge: HOME | End: 2021-11-11
Payer: MEDICARE

## 2021-11-11 VITALS
DIASTOLIC BLOOD PRESSURE: 87 MMHG | RESPIRATION RATE: 16 BRPM | TEMPERATURE: 97 F | SYSTOLIC BLOOD PRESSURE: 144 MMHG | HEART RATE: 90 BPM

## 2021-11-11 DIAGNOSIS — L97.425 DIABETIC ULCER OF LEFT MIDFOOT ASSOCIATED WITH TYPE 2 DIABETES MELLITUS, WITH MUSCLE INVOLVEMENT WITHOUT EVIDENCE OF NECROSIS (HCC): Primary | ICD-10-CM

## 2021-11-11 DIAGNOSIS — E11.621 DIABETIC ULCER OF LEFT MIDFOOT ASSOCIATED WITH TYPE 2 DIABETES MELLITUS, WITH MUSCLE INVOLVEMENT WITHOUT EVIDENCE OF NECROSIS (HCC): Primary | ICD-10-CM

## 2021-11-11 PROCEDURE — 29581 APPL MULTLAYER CMPRN SYS LEG: CPT

## 2021-11-11 PROCEDURE — 99213 OFFICE O/P EST LOW 20 MIN: CPT | Performed by: NURSE PRACTITIONER

## 2021-11-11 RX ORDER — CLOBETASOL PROPIONATE 0.5 MG/G
OINTMENT TOPICAL ONCE
Status: CANCELLED | OUTPATIENT
Start: 2021-11-11 | End: 2021-11-11

## 2021-11-11 RX ORDER — BACITRACIN, NEOMYCIN, POLYMYXIN B 400; 3.5; 5 [USP'U]/G; MG/G; [USP'U]/G
OINTMENT TOPICAL ONCE
Status: CANCELLED | OUTPATIENT
Start: 2021-11-11 | End: 2021-11-11

## 2021-11-11 RX ORDER — GENTAMICIN SULFATE 1 MG/G
OINTMENT TOPICAL ONCE
Status: CANCELLED | OUTPATIENT
Start: 2021-11-11 | End: 2021-11-11

## 2021-11-11 RX ORDER — GINSENG 100 MG
CAPSULE ORAL ONCE
Status: CANCELLED | OUTPATIENT
Start: 2021-11-11 | End: 2021-11-11

## 2021-11-11 RX ORDER — LIDOCAINE 50 MG/G
OINTMENT TOPICAL ONCE
Status: DISCONTINUED | OUTPATIENT
Start: 2021-11-11 | End: 2021-11-12 | Stop reason: HOSPADM

## 2021-11-11 RX ORDER — LIDOCAINE 40 MG/G
CREAM TOPICAL ONCE
Status: CANCELLED | OUTPATIENT
Start: 2021-11-11 | End: 2021-11-11

## 2021-11-11 RX ORDER — BACITRACIN ZINC AND POLYMYXIN B SULFATE 500; 1000 [USP'U]/G; [USP'U]/G
OINTMENT TOPICAL ONCE
Status: CANCELLED | OUTPATIENT
Start: 2021-11-11 | End: 2021-11-11

## 2021-11-11 RX ORDER — BETAMETHASONE DIPROPIONATE 0.05 %
OINTMENT (GRAM) TOPICAL ONCE
Status: CANCELLED | OUTPATIENT
Start: 2021-11-11 | End: 2021-11-11

## 2021-11-11 RX ORDER — LIDOCAINE HYDROCHLORIDE 40 MG/ML
SOLUTION TOPICAL ONCE
Status: CANCELLED | OUTPATIENT
Start: 2021-11-11 | End: 2021-11-11

## 2021-11-11 RX ORDER — LIDOCAINE 50 MG/G
OINTMENT TOPICAL ONCE
Status: CANCELLED | OUTPATIENT
Start: 2021-11-11 | End: 2021-11-11

## 2021-11-11 ASSESSMENT — PAIN SCALES - GENERAL: PAINLEVEL_OUTOF10: 6

## 2021-11-11 ASSESSMENT — PAIN SCALES - WONG BAKER: WONGBAKER_NUMERICALRESPONSE: 0

## 2021-11-11 NOTE — PROGRESS NOTES
Progress Note and application of total contact cast      Norvel Cheadle  AGE: 55 y.o. GENDER: male  : 1975  EPISODE DATE:  2021     Subjective:     Chief Complaint   Patient presents with    Wound Check         HISTORY of PRESENT ILLNESS     Deann schmid 55 y. o. 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  The condition is of moderate severity. The ulcer has been present for approximately 2 years. Anu Carson underlying cause is thought to be diabetes and pressure. The patients care to date has included routine care with debridement per Dr. Rand Chandler. His insurance has recently changed and hasn't seen Dr. Rand Chandler for 2 months. He was evaluated at the [facility] 21 with chest pain and diabetic left foot ulcer. Maxwell Gandara was started on Bactrim and Keflex through 21. Venous ultrasound negative for DVT, left foot Xray demonstrated no destructive bony abnormality identified.  The patient has significant underlying medical conditions as below. The patient is being considered as a candidate for debridement and use of a total contact cast if indicated.     Wound Pain Timing/Severity: none  Quality of pain: N/A  Severity of pain:  0 / 10   Modifying Factors: diabetes, poor glucose control, chronic pressure, decreased mobility, shear force, obesity and anticoagulation therapy  Associated Signs/Symptoms: edema, erythema, drainage and odor     Diabetes: Yes, on Trulicity and insulin regimen, last A1c 8.6 21  Smoking:No, former smoker  Obesity: Yes  Anticoagulant therapy: Xarelto and aspirin  Immunosuppression:No  Other History: Scheduled for cardiac cath this week. 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  Recent history 21 of necrotizing fascitis groin.     Patient educated on the 6 essential components necessary for wound healing: Circulation, Debridements, Proper the other nose surgery\"    OTHER SURGICAL HISTORY Right 05/22/2015    I & D right great toe with partial amputation    OTHER SURGICAL HISTORY  12/04/2017    inc and drainage of abcess    KS DEEP INCIS FOOT BONE INFECTN Left 9/22/2018    LEFT FOOT DEBRIDEMENT INCISION AND DRAINAGE TOP AND BOTTOM performed by Didi Barnes DPM at [facility] N/A 5/15/2021    SCROTAL AND PERINEAL DEBRIDEMENT performed by Lacie Bolton MD at [facility] N/A 5/16/2021    SCROTAL RE-DEBRIDEMENT  INCISION AND DRAINAGE performed by Lacie Bolton MD at [address] [place] [address] [phone]    sebaceous cyst removal times 4     TOE AMPUTATION      right great toe    TOE AMPUTATION Right 3/23/2019    TOE AMPUTATION RIGHT 5TH TOE performed by Didi Barnes DPM at [place] [address] TOE AMPUTATION Right 8/6/2019    TOE AMPUTATION RIGHT 4TH TOE performed by Didi Barnes DPM at [address] [place] [address] UPPER GASTROINTESTINAL ENDOSCOPY  06/2/2011    Mild gastritis with moderatley severe antritis, small hiatal hernia, Dr. Sravan Gonsales N/A 1/12/2019    EGD BIOPSY performed by Aleksey Lunsford MD at [facility]    Family History   Problem Relation Age of Onset    Cancer Mother         ?site   Olivier Stroke Mother     Bleeding Prob Mother     Diabetes Father     Heart Disease Father     High Blood Pressure Father     Obesity Father     Kidney Disease Father     Diabetes Sister     High Blood Pressure Sister     Mental Illness Sister     Obesity Sister     High Blood Pressure Other     Mental Illness Other         bipolar    Other Son         cyst in ear canal    ADHD Daughter        SOCIAL HISTORY    Social History     Tobacco Use    Smoking status: Former Smoker     Years: 20.00     Quit date: 11/18/2017     Years since quitting: 3.9    Smokeless tobacco: Never Used   Vaping Use    Vaping Use: Never used   Substance Use Topics    Alcohol use: Yes     Comment: occ    Drug use: Yes     Frequency: 7.0 times per week     Types: Marijuana (Weed)       ALLERGIES    Allergies   Allergen Reactions    Adhesive Tape Rash    Doxycycline Nausea And Vomiting    Reglan [Metoclopramide] Anxiety       MEDICATIONS    Current Outpatient Medications on File Prior to Encounter   Medication Sig Dispense Refill    apixaban (ELIQUIS) 2.5 MG TABS tablet Take 1 tablet by mouth 2 times daily 60 tablet 5    clopidogrel (PLAVIX) 75 MG tablet Take 1 tablet by mouth daily 30 tablet 3    insulin glargine (LANTUS) 100 UNIT/ML injection vial Inject 75 Units into the skin nightly      insulin aspart (NOVOLOG RELION) 100 UNIT/ML injection vial Inject 35 Units into the skin 3 times daily (before meals)      atorvastatin (LIPITOR) 40 MG tablet Take 1 tablet by mouth nightly 30 tablet 3    metoprolol tartrate (LOPRESSOR) 25 MG tablet Take 1 tablet by mouth 2 times daily 60 tablet 3    amLODIPine (NORVASC) 10 MG tablet Take 1 tablet by mouth daily 30 tablet 3    chlorthalidone (HYGROTON) 25 MG tablet Take 1 tablet by mouth daily 30 tablet 3    sertraline (ZOLOFT) 50 MG tablet Take 2 tablets by mouth daily 30 tablet 3    aspirin 81 MG EC tablet Take 1 tablet by mouth daily 30 tablet 3    linagliptin (TRADJENTA) 5 MG tablet Take 1 tablet by mouth daily 30 tablet 5    ondansetron (ZOFRAN) 4 MG tablet Take 1 tablet by mouth every 8 hours as needed for Nausea or Vomiting 20 tablet 0    Lancets MISC 1 each by Does not apply route daily 100 each 3    furosemide (LASIX) 40 MG tablet Take 1 tablet by mouth daily 30 tablet 1    glucose monitoring kit (FREESTYLE) monitoring kit 1 each by Does not apply route once for 1 dose. 1 kit 0     No current facility-administered medications on file prior to encounter. REVIEW OF SYSTEMS    Pertinent items are noted in HPI.   Constitutional: Negative for systemic symptoms including fever, chills and malaise. Objective:      /75   Pulse 90   Temp 97.8 °F (36.6 °C) (Temporal)   Resp 18     PHYSICAL EXAM    General: The patient is in no acute distress. Mental status:  Patient is appropriate, is  oriented to place and plan of care. Dermatologic exam: Visual inspection of the periwound reveals the skin to be coarse and callus  Wound exam: see wound description below in procedure note      Assessment:     Problem List Items Addressed This Visit     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 type 2 diabetes mellitus, with muscle involvement without evidence of necrosis ([address] - Primary          Procedure: The wound bed and lower extremity was cleansed and prepared as necessary for debridement and casting. Debridement was required. Consent obtained: Yes    Time out taken: Yes    Using curette sharp Excisional Debridement of the wound(s) was/were performed down through and including the removal of subcutaneous tissue.      Devitalized Tissue Debrided:  slough, exudate and callus    Pre Debridement Measurements:  Are located in the Wound Documentation Flow Sheet    All active wounds listed below with today's date are evaluated  Wound(s)    debrided this date include # : 1     Post  Debridement Measurements:  Wound 09/21/21 Foot Left;Plantar #1 (Active)   Wound Image   11/09/21 1402   Wound Etiology Pressure Stage  2 11/09/21 1402   Dressing Status New dressing applied 11/09/21 1505   Wound Cleansed Soap and water 11/09/21 1402   Offloading for Diabetic Foot Ulcers Total contact cast 11/09/21 1505   Wound Length (cm) 0.7 cm 11/09/21 1402   Wound Width (cm) 0.5 cm 11/09/21 1402   Wound Depth (cm) 0.3 cm 11/09/21 1402   Wound Surface Area (cm^2) 0.35 cm^2 11/09/21 1402   Change in Wound Size % (l*w) 96.5 11/09/21 1402   Wound Volume (cm^3) 0.105 cm^3 11/09/21 1402   Wound Healing % 99 11/09/21 1402   Post-Procedure Length (cm) 0.7 cm 11/09/21 1424   Post-Procedure Width (cm) 0.5 cm 11/09/21 1424   Post-Procedure Depth (cm) 0.3 cm 11/09/21 1424   Post-Procedure Surface Area (cm^2) 0.35 cm^2 11/09/21 1424   Post-Procedure Volume (cm^3) 0.105 cm^3 11/09/21 1424   Distance Tunneling (cm) 0 cm 11/09/21 1402   Tunneling Position ___ O'Clock 0 11/09/21 1402   Undermining Starts ___ O'Clock 0 11/09/21 1402   Undermining Ends___ O'Clock 0 11/09/21 1402   Undermining Maxium Distance (cm) 0 11/09/21 1402   Wound Assessment Dry; Granulation tissue 11/09/21 1402   Drainage Amount Moderate 11/09/21 1402   Drainage Description Serosanguinous 11/09/21 1402   Odor None 11/09/21 1402   Shazia-wound Assessment Hyperkeratosis (callous) 11/09/21 1402   Margins Attached edges 11/09/21 1402   Wound Thickness Description not for Pressure Injury Full thickness 11/09/21 1402   Number of days: 50       Percent of Wound(s) Debrided: approximately 100%    Total  Area  Debrided[de-identified]  0.35 sq cm       Bleeding:  Minimal    Hemostasis Achieved:  by pressure    Procedural Pain:  0  / 10     Post Procedural Pain:  0 / 10     Application of cast:    With the indications of casting being present and no contraindications, information and  instructions were given to the patient and the patient consented to the treatment. After proper wound care and appropriate padding. A total contact cast was applied according to protocol. Fall prevention safety and instructions on using various supportive walking devices were discussed with the patient. The patient was also instructed on what to do and how to get help if the cast became uncomfortable. The patient expressed understanding of all of these issues. The patient tolerated the procedure well. Status of wound progress and description from last visit: Improving but slow healing, will apply TCC today.       Plan:       Discharge Instructions         Discharge Instructions        PHYSICIAN ORDERS AND DISCHARGE INSTRUCTIONS     NOTE: Upon discharge from the [address], you will receive a patient experience survey. We would be grateful if you would take the time to fill this survey out.     Wound care order history:                 BAIRON's   Right   1.38    Left  0.94           Date 9/21/2021              Vascular studies:   Date               Imaging:   Date               Cultures:   Date obtained on 9/21/2021              Grafts:  Date               Antibiotics:               Earlier Wound care treatments:                          Primary care physician     Continuing wound care orders and information:              Residence:  Private               Continue [facility] with: [facility]     Wound Medications:              JYLMN cleansing:                           LK not scrub or use excessive force.                          Wash hands with soap and water before and after dressing changes.                         Prior to applying a clean dressing, cleanse wound with normal saline,                                wound cleanser, or mild soap and water.                           Ask the physician or nurse before getting the wound(s) wet in a shower              Daily Wound management:                          Keep weight off wounds and reposition every 2 hours.                          PEULU standing for long periods of time.                          QBWTW wraps/stockings in AM and remove at bedtime.                          If swelling is present, elevate legs to the level of the heart or above for 30 minutes 4-5 times a day and/or when sitting.                                             When taking antibiotics take entire prescription as ordered by physician do not stop taking until medicine is all gone.                                                          Orders for this week: 11/9/2021                   Left Plantar Foot -- wash with soap and water, pat dry. Foam to medial leg. Apply triple  layer of Ag Ca Alginate to periwound with steri strips to hold in place to offload. Gentamicin and stimulin powder to wound bed. Cover with Jannie Kartik and foam  . TCC           Right leg---TUBI F         Rx: [facility] [employer]  Consult:  Central Scheduling: [phone]    CAST CHANGE ON Thursday      Follow up with Eli Palma CNP in 1 week in the wound care center  Call [phone] for any questions or concerns.   Date__________   Time____________            Treatment Note Wound 09/21/21 Foot Left;Plantar #1-Dressing/Treatment:  (Gentamicin, stimulin powder Qwic, TTC)    Written Patient Dismissal Instructions Given            Electronically signed by APPLE Haskins - CNP on 11/11/2021 at 1:49 PM

## 2021-11-11 NOTE — PROGRESS NOTES
Multilayer Compression Wrap   (Not Unna) Below the Knee    NAME:  Rosamaria Michael OF BIRTH:  1975  MEDICAL RECORD NUMBER:  [id number]  DATE:  11/11/2021    Multilayer compression wrap: Removed old Multilayer wrap if indicated and wash leg with mild soap/water. Applied moisturizing agent to dry skin as needed. Applied primary and secondary dressing as ordered. Applied multilayered dressing below the knee to right lower leg. Instructed patient/caregiver not to remove dressing and to keep it clean and dry. Instructed patient/caregiver on complications to report to provider, such as pain, numbness in toes, heavy drainage, and slippage of dressing. Instructed patient on purpose of compression dressing and on activity and exercise recommendations.       Electronically signed by Kedar Spears LPN on 54/31/6125 at 3:08 PM

## 2021-11-11 NOTE — PROGRESS NOTES
Wound Care Center Progress Note       Lon Swain  AGE: 55 y.o. GENDER: male  : 1975  TODAY'S DATE:  2021        Subjective:     Chief Complaint   Patient presents with    Wound Check     left foot          HISTORY of PRESENT ILLNESS       Tram Smith a 55 y. o. 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  The condition is of moderate severity. The ulcer has been present for approximately 2 years. Milas Medico underlying cause is thought to be diabetes and pressure. The patients care to date has included routine care with debridement per Dr. Baylee Griffith. His insurance has recently changed and hasn't seen Dr. Baylee Griffith for 2 months. He was evaluated at the [facility] 21 with chest pain and diabetic left foot ulcer. Humberto Zaragoza was started on Bactrim and Keflex through 21. Venous ultrasound negative for DVT, left foot Xray demonstrated no destructive bony abnormality identified.  The patient has significant underlying medical conditions as below.      Wound Pain Timing/Severity: none  Quality of pain: N/A  Severity of pain:  0 / 10   Modifying Factors: diabetes, poor glucose control, chronic pressure, decreased mobility, shear force, obesity and anticoagulation therapy  Associated Signs/Symptoms: edema, erythema, drainage and odor     Diabetes: Yes, on Trulicity and insulin regimen, last A1c 8.6 21  Smoking:No, former smoker  Obesity: Yes  Anticoagulant therapy: Xarelto and aspirin  Immunosuppression:No  Other History: Scheduled for cardiac cath this week. 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  Recent history 21 of necrotizing fascitis groin.     Patient educated on the 6 essential components necessary for wound healing: Circulation, Debridements, Proper Dressings and Topical Wound Products, Infection Control, Edema Control and Offloading.     Patient educated on those factors that negatively effect or impact wound healing: smoking, obesity, uncontrolled diabetes, anticoagulant and immunosuppressive regimens, inadequate nutrition, untreated arterial and venous disease if applicable and measures to manage edema.         PAST MEDICAL HISTORY        Diagnosis Date    Abscess 2010    scrotal    Acute renal failure (ARF) ([address] 08/04/2019    Anxiety associated with depression     Anxiety associated with depression     Back pain 07/02/2012    Chest pain 05/01/2013    Diabetic nephropathy ([address]     Diabetic neuropathy ([address] 12/22/2011    Diabetic ulcer of left midfoot associated with type 2 diabetes mellitus, with fat layer exposed ([address] 07/18/2017    Diverticulosis     C scope + Dr. Kwame Cohen DM (diabetes mellitus), type 2 ([address] 2002    DR. 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 Kwame Cohen Necrotizing fasciitis [facility])     Nose fracture 1988    Panic attacks     Pericarditis 2003    Hospitalized with s/p heart cath normal    Peripheral autonomic neuropathy due to diabetes mellitus ([address]     Axonal EMG- [facility], March 2011    Sebaceous cyst 09/01/2011    URI (upper respiratory infection) 02/27/2012    WD-Wound, surgical, infected, initial encounter 12/08/2017    Wrist fracture 1986       PAST SURGICAL HISTORY    Past Surgical History:   Procedure Laterality Date   [address] 2003, 2013    Normal (Dr. Jennifer Nguyen)    COLONOSCOPY  6/2/2011    Pandiverticulosis, Nonbleeding internal hemorrhoids, Repeat colonoscopy at age 48- Dr. Henry Stevenson    \"had reconstruction surgery on nose a year after the other nose surgery\"    OTHER SURGICAL HISTORY Right 05/22/2015    I & D right great toe with partial amputation    OTHER SURGICAL HISTORY  12/04/2017    inc and drainage of abcess    SD DEEP INCIS FOOT BONE INFECTN Left 9/22/2018    LEFT FOOT DEBRIDEMENT INCISION AND DRAINAGE TOP AND BOTTOM performed by Milagros Mejia DPM at [facility] 5/15/2021    SCROTAL AND PERINEAL DEBRIDEMENT performed by Annabella Dorman MD at [facility] 5/16/2021    SCROTAL RE-DEBRIDEMENT  INCISION AND DRAINAGE performed by Annabella Dorman MD at [address] [place] [address] [phone]    sebaceous cyst removal times 4     TOE AMPUTATION      right great toe    TOE AMPUTATION Right 3/23/2019    TOE AMPUTATION RIGHT 5TH TOE performed by Milagros Mejia DPM at [place] [id number] TOE AMPUTATION Right 8/6/2019    TOE AMPUTATION RIGHT 4TH TOE performed by Milagros Mejia DPM at [address] [place] [address] UPPER GASTROINTESTINAL ENDOSCOPY  06/2/2011    Mild gastritis with moderatley severe antritis, small hiatal hernia, Dr. Ector Singer N/A 1/12/2019    EGD BIOPSY performed by Sharita Rdz MD at [facility]    Family History   Problem Relation Age of Onset    Cancer Mother         ?site   [facility] Stroke Mother     Bleeding Prob Mother     Diabetes Father     Heart Disease Father     High Blood Pressure Father     Obesity Father     Kidney Disease Father     Diabetes Sister     High Blood Pressure Sister     Mental Illness Sister     Obesity Sister     High Blood Pressure Other     Mental Illness Other         bipolar    Other Son         cyst in ear canal    ADHD Daughter        SOCIAL HISTORY    Social History     Tobacco Use    Smoking status: Former Smoker     Years: 20.00     Quit date: 11/18/2017     Years since quitting: 3.9    Smokeless tobacco: Never Used   Vaping Use    Vaping Use: Never used   Substance Use Topics    Alcohol use: Yes     Comment: occ    Drug use: Yes     Frequency: 7.0 times per week Types: Marijuana (Weed)       ALLERGIES    Allergies   Allergen Reactions    Adhesive Tape Rash    Doxycycline Nausea And Vomiting    Reglan [Metoclopramide] Anxiety       MEDICATIONS    Current Outpatient Medications on File Prior to Encounter   Medication Sig Dispense Refill    apixaban (ELIQUIS) 2.5 MG TABS tablet Take 1 tablet by mouth 2 times daily 60 tablet 5    clopidogrel (PLAVIX) 75 MG tablet Take 1 tablet by mouth daily 30 tablet 3    insulin glargine (LANTUS) 100 UNIT/ML injection vial Inject 75 Units into the skin nightly      insulin aspart (NOVOLOG RELION) 100 UNIT/ML injection vial Inject 35 Units into the skin 3 times daily (before meals)      atorvastatin (LIPITOR) 40 MG tablet Take 1 tablet by mouth nightly 30 tablet 3    metoprolol tartrate (LOPRESSOR) 25 MG tablet Take 1 tablet by mouth 2 times daily 60 tablet 3    amLODIPine (NORVASC) 10 MG tablet Take 1 tablet by mouth daily 30 tablet 3    chlorthalidone (HYGROTON) 25 MG tablet Take 1 tablet by mouth daily 30 tablet 3    sertraline (ZOLOFT) 50 MG tablet Take 2 tablets by mouth daily 30 tablet 3    aspirin 81 MG EC tablet Take 1 tablet by mouth daily 30 tablet 3    linagliptin (TRADJENTA) 5 MG tablet Take 1 tablet by mouth daily 30 tablet 5    ondansetron (ZOFRAN) 4 MG tablet Take 1 tablet by mouth every 8 hours as needed for Nausea or Vomiting 20 tablet 0    Lancets MISC 1 each by Does not apply route daily 100 each 3    furosemide (LASIX) 40 MG tablet Take 1 tablet by mouth daily 30 tablet 1    glucose monitoring kit (FREESTYLE) monitoring kit 1 each by Does not apply route once for 1 dose. 1 kit 0     No current facility-administered medications on file prior to encounter. REVIEW OF SYSTEMS    Pertinent items are noted in HPI. Constitutional: Negative for systemic symptoms including fever, chills and malaise.     Objective:      BP (!) 144/87   Pulse 90   Temp 97 °F (36.1 °C) (Temporal)   Resp 16     PHYSICAL EXAM    General: The patient is in no acute distress. Mental status:  Patient is appropriate, is  oriented to place and plan of care. Dermatologic exam: Visual inspection of the periwound reveals the skin to be normal in turgor and texture. Wound exam:  see wound description below     All active wounds listed below with today's date are evaluated      Wound 09/21/21 Foot Left;Plantar #1 (Active)   Wound Image   11/09/21 1402   Wound Etiology Pressure Stage  2 11/11/21 1454   Dressing Status New dressing applied 11/11/21 1506   Wound Cleansed Wound cleanser; Soap and water 11/11/21 1503   Offloading for Diabetic Foot Ulcers Other (comment);  Felt or foam 11/11/21 1506   Wound Length (cm) 0.9 cm 11/11/21 1503   Wound Width (cm) 0.6 cm 11/11/21 1503   Wound Depth (cm) 0.3 cm 11/11/21 1503   Wound Surface Area (cm^2) 0.54 cm^2 11/11/21 1503   Change in Wound Size % (l*w) 94.6 11/11/21 1503   Wound Volume (cm^3) 0.162 cm^3 11/11/21 1503   Wound Healing % 98 11/11/21 1503   Post-Procedure Length (cm) 0.7 cm 11/09/21 1424   Post-Procedure Width (cm) 0.5 cm 11/09/21 1424   Post-Procedure Depth (cm) 0.3 cm 11/09/21 1424   Post-Procedure Surface Area (cm^2) 0.35 cm^2 11/09/21 1424   Post-Procedure Volume (cm^3) 0.105 cm^3 11/09/21 1424   Distance Tunneling (cm) 0 cm 11/11/21 1503   Tunneling Position ___ O'Clock 0 11/11/21 1503   Undermining Starts ___ O'Clock 0 11/11/21 1503   Undermining Ends___ O'Clock 0 11/11/21 1503   Undermining Maxium Distance (cm) 0 11/11/21 1503   Wound Assessment Pink/red 11/11/21 1503   Drainage Amount Moderate 11/11/21 1503   Drainage Description Serosanguinous 11/11/21 1503   Odor None 11/11/21 1503   Shazia-wound Assessment Hyperkeratosis (callous) 11/11/21 1503   Margins Defined edges 11/11/21 1503   Wound Thickness Description not for Pressure Injury Full thickness 11/11/21 1503   Number of days: 51       Assessment:       Problem List Items Addressed This Visit     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 type 2 diabetes mellitus, with muscle involvement without evidence of necrosis ([address] - Primary    Relevant Medications    lidocaine (XYLOCAINE) 5 % ointment    Other Relevant Orders    Initiate Outpatient Wound Care Protocol          Status of wound progress and description from last visit: The patient was here for follow up post TCC application earlier in the week. The patient declines further casting related to claustrophobia and panic with the cast on and limited mobility. Will resume previous regimen . Plan:     Discharge Instructions         Discharge Instructions        PHYSICIAN ORDERS AND DISCHARGE INSTRUCTIONS     NOTE: Upon discharge from the [address], you will receive a patient experience survey. We would be grateful if you would take the time to fill this survey out.     Wound care order history:                 BAIRON's   Right   1.38    Left  0.94           Date 9/21/2021              Vascular studies:   Date               Imaging:   Date               Cultures:   Date obtained on 9/21/2021              Grafts:  Date               Antibiotics:               Earlier Wound care treatments:                          Primary care physician     Continuing wound care orders and information:              Residence:  Private               Continue [facility]: [facility]     Wound Medications:              TMTZI cleansing:                           JG not scrub or use excessive force.                          Wash hands with soap and water before and after dressing changes.                           Prior to applying a clean dressing, cleanse wound with normal saline,                                wound cleanser, or mild soap and water.                           Ask the physician or nurse before getting the wound(s) wet in a shower              Daily Wound management:                          Keep weight off wounds and reposition every 2 hours.                          BBYJK standing for long periods of time.                          IZRLF wraps/stockings in AM and remove at bedtime.                          If swelling is present, elevate legs to the level of the heart or above for 30 minutes 4-5 times a day and/or when sitting.                                             When taking antibiotics take entire prescription as ordered by physician do not stop taking until medicine is all gone.                                                          Orders for this week: 2021                   Left Plantar Foot -- wash with soap and water, pat dry. Foam to medial/anterior  leg. Apply triple  layer of Ag Ca Alginate to periwound with steri strips to hold in place to offload. Gentamicin and stimulin powder to wound bed. Cover with [employer]. Wrap with coban 2            Right leg---ROMAN GANDHI         Rx: [facility]  Consult:  Central [phone]     CAST CHANGE ON Thursday      Follow up with Vidya Sheth CNP in 1 week in the wound care center  Call [phone] for any questions or concerns.   Date__________   Time____________            Treatment Note Wound 21 Foot Left;Plantar #1-Dressing/Treatment:  (gentamicin, stimulin poweder ca alg abd coban 2 )    Written Patient Dismissal Instructions Given            Electronically signed by APPLE Farr CNP on 2021 at 5:15 PM

## 2021-11-23 ENCOUNTER — HOSPITAL ENCOUNTER (OUTPATIENT)
Dept: WOUND CARE | Age: 46
Discharge: HOME | End: 2021-11-23
Payer: MEDICARE

## 2021-11-23 VITALS
HEART RATE: 91 BPM | TEMPERATURE: 98.9 F | RESPIRATION RATE: 16 BRPM | DIASTOLIC BLOOD PRESSURE: 99 MMHG | SYSTOLIC BLOOD PRESSURE: 140 MMHG

## 2021-11-23 DIAGNOSIS — L97.425 DIABETIC ULCER OF LEFT MIDFOOT ASSOCIATED WITH TYPE 2 DIABETES MELLITUS, WITH MUSCLE INVOLVEMENT WITHOUT EVIDENCE OF NECROSIS (HCC): Primary | ICD-10-CM

## 2021-11-23 DIAGNOSIS — E11.621 DIABETIC ULCER OF LEFT MIDFOOT ASSOCIATED WITH TYPE 2 DIABETES MELLITUS, WITH MUSCLE INVOLVEMENT WITHOUT EVIDENCE OF NECROSIS (HCC): Primary | ICD-10-CM

## 2021-11-23 PROCEDURE — 11042 DBRDMT SUBQ TIS 1ST 20SQCM/<: CPT | Performed by: NURSE PRACTITIONER

## 2021-11-23 PROCEDURE — 11042 DBRDMT SUBQ TIS 1ST 20SQCM/<: CPT

## 2021-11-23 RX ORDER — GINSENG 100 MG
CAPSULE ORAL ONCE
Status: CANCELLED | OUTPATIENT
Start: 2021-11-23 | End: 2021-11-23

## 2021-11-23 RX ORDER — GENTAMICIN SULFATE 1 MG/G
OINTMENT TOPICAL ONCE
Status: CANCELLED | OUTPATIENT
Start: 2021-11-23 | End: 2021-11-23

## 2021-11-23 RX ORDER — LIDOCAINE HYDROCHLORIDE 40 MG/ML
SOLUTION TOPICAL ONCE
Status: CANCELLED | OUTPATIENT
Start: 2021-11-23 | End: 2021-11-23

## 2021-11-23 RX ORDER — CLOBETASOL PROPIONATE 0.5 MG/G
OINTMENT TOPICAL ONCE
Status: CANCELLED | OUTPATIENT
Start: 2021-11-23 | End: 2021-11-23

## 2021-11-23 RX ORDER — BETAMETHASONE DIPROPIONATE 0.05 %
OINTMENT (GRAM) TOPICAL ONCE
Status: CANCELLED | OUTPATIENT
Start: 2021-11-23 | End: 2021-11-23

## 2021-11-23 RX ORDER — LIDOCAINE 40 MG/G
CREAM TOPICAL ONCE
Status: CANCELLED | OUTPATIENT
Start: 2021-11-23 | End: 2021-11-23

## 2021-11-23 RX ORDER — BACITRACIN, NEOMYCIN, POLYMYXIN B 400; 3.5; 5 [USP'U]/G; MG/G; [USP'U]/G
OINTMENT TOPICAL ONCE
Status: CANCELLED | OUTPATIENT
Start: 2021-11-23 | End: 2021-11-23

## 2021-11-23 RX ORDER — BACITRACIN ZINC AND POLYMYXIN B SULFATE 500; 1000 [USP'U]/G; [USP'U]/G
OINTMENT TOPICAL ONCE
Status: CANCELLED | OUTPATIENT
Start: 2021-11-23 | End: 2021-11-23

## 2021-11-23 RX ORDER — LIDOCAINE 50 MG/G
OINTMENT TOPICAL ONCE
Status: DISCONTINUED | OUTPATIENT
Start: 2021-11-23 | End: 2021-11-24 | Stop reason: HOSPADM

## 2021-11-23 RX ORDER — LIDOCAINE 50 MG/G
OINTMENT TOPICAL ONCE
Status: CANCELLED | OUTPATIENT
Start: 2021-11-23 | End: 2021-11-23

## 2021-11-23 ASSESSMENT — PAIN DESCRIPTION - DESCRIPTORS: DESCRIPTORS: SHARP;CONSTANT

## 2021-11-23 ASSESSMENT — PAIN DESCRIPTION - PAIN TYPE: TYPE: ACUTE PAIN

## 2021-11-23 ASSESSMENT — PAIN DESCRIPTION - FREQUENCY: FREQUENCY: CONTINUOUS

## 2021-11-23 ASSESSMENT — PAIN - FUNCTIONAL ASSESSMENT: PAIN_FUNCTIONAL_ASSESSMENT: PREVENTS OR INTERFERES SOME ACTIVE ACTIVITIES AND ADLS

## 2021-11-23 ASSESSMENT — PAIN DESCRIPTION - ONSET: ONSET: ON-GOING

## 2021-11-23 ASSESSMENT — PAIN DESCRIPTION - ORIENTATION: ORIENTATION: LEFT

## 2021-11-23 ASSESSMENT — PAIN DESCRIPTION - PROGRESSION: CLINICAL_PROGRESSION: NOT CHANGED

## 2021-11-23 ASSESSMENT — PAIN SCALES - GENERAL: PAINLEVEL_OUTOF10: 6

## 2021-11-23 ASSESSMENT — PAIN DESCRIPTION - LOCATION: LOCATION: FOOT

## 2021-11-23 NOTE — PROGRESS NOTES
Multilayer Compression Wrap   (Not Unna) Below the Knee    NAME:  Maureen Clarity OF BIRTH:  1975  MEDICAL RECORD NUMBER:  [id number]  DATE:  11/23/2021    Multilayer compression wrap: Removed old Multilayer wrap if indicated and wash leg with mild soap/water. Applied moisturizing agent to dry skin as needed. Applied primary and secondary dressing as ordered. Applied multilayered dressing below the knee to left lower leg. Instructed patient/caregiver not to remove dressing and to keep it clean and dry. Instructed patient/caregiver on complications to report to provider, such as pain, numbness in toes, heavy drainage, and slippage of dressing. Instructed patient on purpose of compression dressing and on activity and exercise recommendations.       Electronically signed by [place] LPN on 26/83/8820 at 3:09 PM

## 2021-11-29 NOTE — PROGRESS NOTES
Wound Care Center Progress Note With Procedure    Magui Altman  AGE: 55 y.o. GENDER: male  : 1975  EPISODE DATE:  2021     Subjective:     Chief Complaint   Patient presents with    Wound Check     left leg         HISTORY of PRESENT ILLNESS     Zoe schmid 55 y. o. 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  The condition is of moderate severity. The ulcer has been present for approximately 2 years. Doloris Fill underlying cause is thought to be diabetes and pressure. The patients care to date has included routine care with debridement per Dr. Elisa Smith. His insurance has recently changed and hasn't seen Dr. Elisa Smith for 2 months. He was evaluated at the [facility] 21 with chest pain and diabetic left foot ulcer. Peterson Mayo was started on Bactrim and Keflex through 21. Venous ultrasound negative for DVT, left foot Xray demonstrated no destructive bony abnormality identified.  The patient has significant underlying medical conditions as below. The patient is being considered as a candidate for debridement and use of a total contact cast if indicated.     Wound Pain Timing/Severity: none  Quality of pain: N/A  Severity of pain:  0 / 10   Modifying Factors: diabetes, poor glucose control, chronic pressure, decreased mobility, shear force, obesity and anticoagulation therapy  Associated Signs/Symptoms: edema, erythema, drainage and odor     Diabetes: Yes, on Trulicity and insulin regimen, last A1c 8.6 21  Smoking:No, former smoker  Obesity: Yes  Anticoagulant therapy: Xarelto and aspirin  Immunosuppression:No  Other History: Scheduled for cardiac cath this week. 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  Recent history 21 of necrotizing fascitis groin.     Patient educated on the 6 essential components necessary for wound healing: Circulation, Debridements, Proper Dressings and Topical Wound Products, Infection Control, Edema Control and Offloading.     Patient educated on those factors that negatively effect or impact wound healing: smoking, obesity, uncontrolled diabetes, anticoagulant and immunosuppressive regimens, inadequate nutrition, untreated arterial and venous disease if applicable and measures to manage edema.         PAST MEDICAL HISTORY        Diagnosis Date    Abscess 2010    scrotal    Acute renal failure (ARF) ([address] 08/04/2019    Anxiety associated with depression     Anxiety associated with depression     Back pain 07/02/2012    Chest pain 05/01/2013    Diabetic nephropathy ([address]     Diabetic neuropathy ([address] 12/22/2011    Diabetic ulcer of left midfoot associated with type 2 diabetes mellitus, with fat layer exposed ([address] 07/18/2017    Diverticulosis     C scope + Dr. Carmelina Cm DM (diabetes mellitus), type 2 ([address] 2002    DR. 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 Carmelina mC Necrotizing fasciitis [facility])     Nose fracture 1988    Panic attacks     Pericarditis 2003    Hospitalized with s/p heart cath normal    Peripheral autonomic neuropathy due to diabetes mellitus ([address]     Axonal EMG- NCS, March 2011    Sebaceous cyst 09/01/2011    URI (upper respiratory infection) 02/27/2012    WD-Wound, surgical, infected, initial encounter 12/08/2017    Wrist fracture 1986       PAST SURGICAL HISTORY    Past Surgical History:   Procedure Laterality Date   [address] 2003, 2013    Normal (Dr. Miguelito Jasso)    COLONOSCOPY  6/2/2011    Pandiverticulosis, Nonbleeding internal hemorrhoids, Repeat colonoscopy at age 48- Dr. Shar Clement    \"had reconstruction surgery on nose a year after the other nose surgery\"    OTHER SURGICAL HISTORY Right 2015    I & D right great toe with partial amputation    OTHER SURGICAL HISTORY  2017    inc and drainage of abcess    MO DEEP INCIS FOOT BONE INFECTN Left 2018    LEFT FOOT DEBRIDEMENT INCISION AND DRAINAGE TOP AND BOTTOM performed by Aleja Tapia DPM at [facility] N/A 5/15/2021    SCROTAL AND PERINEAL DEBRIDEMENT performed by Conor Rivera MD at [facility] N/A 2021    SCROTAL RE-DEBRIDEMENT  INCISION AND DRAINAGE performed by Conor Rivera MD at [address] [place] [address] [phone]    sebaceous cyst removal times 4     TOE AMPUTATION      right great toe    TOE AMPUTATION Right 3/23/2019    TOE AMPUTATION RIGHT 5TH TOE performed by Aleja Tapia DPM at [place] [address] TOE AMPUTATION Right 2019    TOE AMPUTATION RIGHT 4TH TOE performed by Aleja Tapia DPM at [address] [place] [address] UPPER GASTROINTESTINAL ENDOSCOPY  2011    Mild gastritis with moderatley severe antritis, small hiatal hernia, Dr. Bunny Sam 47 N/A 2019    EGD BIOPSY performed by Ryan [facility]amontes [facility] at [facility]    Family History   Problem Relation Age of Onset    Cancer Mother         ?site   [facility] Stroke Mother     Bleeding Prob Mother     Diabetes Father     Heart Disease Father     High Blood Pressure Father     Obesity Father     Kidney Disease Father     Diabetes Sister     High Blood Pressure Sister     Mental Illness Sister     Obesity Sister     High Blood Pressure Other     Mental Illness Other         bipolar    Other Son         cyst in ear canal    ADHD Daughter        SOCIAL HISTORY    Social History     Tobacco Use    Smoking status: Former Smoker     Years: 20.00     Quit date: 2017     Years since quittin.0    Smokeless tobacco: Never Used   Vaping Use    Vaping Use: Never used Substance Use Topics    Alcohol use: Yes     Comment: occ    Drug use: Yes     Frequency: 7.0 times per week     Types: Marijuana (Weed)       ALLERGIES    Allergies   Allergen Reactions    Adhesive Tape Rash    Doxycycline Nausea And Vomiting    Reglan [Metoclopramide] Anxiety       MEDICATIONS    Current Outpatient Medications on File Prior to Encounter   Medication Sig Dispense Refill    apixaban (ELIQUIS) 2.5 MG TABS tablet Take 1 tablet by mouth 2 times daily 60 tablet 5    clopidogrel (PLAVIX) 75 MG tablet Take 1 tablet by mouth daily 30 tablet 3    insulin glargine (LANTUS) 100 UNIT/ML injection vial Inject 75 Units into the skin nightly      insulin aspart (NOVOLOG RELION) 100 UNIT/ML injection vial Inject 35 Units into the skin 3 times daily (before meals)      atorvastatin (LIPITOR) 40 MG tablet Take 1 tablet by mouth nightly 30 tablet 3    metoprolol tartrate (LOPRESSOR) 25 MG tablet Take 1 tablet by mouth 2 times daily 60 tablet 3    amLODIPine (NORVASC) 10 MG tablet Take 1 tablet by mouth daily 30 tablet 3    chlorthalidone (HYGROTON) 25 MG tablet Take 1 tablet by mouth daily 30 tablet 3    sertraline (ZOLOFT) 50 MG tablet Take 2 tablets by mouth daily 30 tablet 3    aspirin 81 MG EC tablet Take 1 tablet by mouth daily 30 tablet 3    linagliptin (TRADJENTA) 5 MG tablet Take 1 tablet by mouth daily 30 tablet 5    furosemide (LASIX) 40 MG tablet Take 1 tablet by mouth daily 30 tablet 1    ondansetron (ZOFRAN) 4 MG tablet Take 1 tablet by mouth every 8 hours as needed for Nausea or Vomiting 20 tablet 0    Lancets MISC 1 each by Does not apply route daily 100 each 3    glucose monitoring kit (FREESTYLE) monitoring kit 1 each by Does not apply route once for 1 dose. 1 kit 0     No current facility-administered medications on file prior to encounter. REVIEW OF SYSTEMS    Pertinent items are noted in HPI.     Constitutional: Negative for systemic symptoms including fever, chills and malaise. Objective:      BP (!) 140/99   Pulse 91   Temp 98.9 °F (37.2 °C) (Temporal)   Resp 16     PHYSICAL EXAM    General: The patient is in no acute distress. Mental status:  Patient is appropriate, is  oriented to place and plan of care. Dermatologic exam: Visual inspection of the periwound reveals the skin to be dry, coarse and callus  Wound exam: see wound description below in procedure note      Assessment:     Problem List Items Addressed This Visit     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 type 2 diabetes mellitus, with muscle involvement without evidence of necrosis ([address] - Primary        Procedure Note    Indications:  Based on my examination of this patient's wound(s) today, sharp excision into necrotic subcutaneous tissue is required to promote healing and evaluate the extent of previous healing. Performed by: APPLE Uriarte - CNP    Consent obtained: Yes    Time out taken:  Yes    Pain Control: Anesthetic  Anesthetic: 5% Lidocaine Ointment Topical     Debridement:Excisional Debridement    Using curette the wound(s) was/were sharply debrided down through and including the removal of subcutaneous tissue.         Devitalized Tissue Debrided:  slough, exudate and callus    Pre Debridement Measurements:  Are located in the Wound Documentation Flow Sheet    All active wounds listed below with today's date are evaluated  Wound(s)    debrided this date include # : 1     Post  Debridement Measurements:  Wound 09/21/21 Foot Left;Plantar #1 (Active)   Wound Image   11/23/21 1422   Wound Etiology Pressure Stage  2 11/23/21 1422   Dressing Status New dressing applied 11/23/21 1507   Wound Cleansed Wound cleanser 11/23/21 1422   Offloading for Diabetic Foot Ulcers Felt or foam 11/23/21 1507   Wound Length (cm) 0.9 cm 11/23/21 1422   Wound Width (cm) 0.4 cm 11/23/21 1422   Wound Depth (cm) 0.3 cm 11/23/21 1422   Wound Surface Area (cm^2) 0.36 cm^2 11/23/21 1422   Change in Wound Size % (l*w) 96.4 11/23/21 1422   Wound Volume (cm^3) 0.108 cm^3 11/23/21 1422   Wound Healing % 99 11/23/21 1422   Post-Procedure Length (cm) 0.9 cm 11/23/21 1442   Post-Procedure Width (cm) 0.4 cm 11/23/21 1442   Post-Procedure Depth (cm) 0.3 cm 11/23/21 1442   Post-Procedure Surface Area (cm^2) 0.36 cm^2 11/23/21 1442   Post-Procedure Volume (cm^3) 0.108 cm^3 11/23/21 1442   Distance Tunneling (cm) 0 cm 11/23/21 1422   Tunneling Position ___ O'Clock 0 11/23/21 1422   Undermining Starts ___ O'Clock 0 11/23/21 1422   Undermining Ends___ O'Clock 0 11/23/21 1422   Undermining Maxium Distance (cm) 0 11/23/21 1422   Wound Assessment Pink/red 11/23/21 1422   Drainage Amount Moderate 11/23/21 1422   Drainage Description Serosanguinous 11/23/21 1422   Odor None 11/23/21 1422   Shazia-wound Assessment Hyperkeratosis (callous) 11/23/21 1422   Margins Defined edges 11/23/21 1422   Wound Thickness Description not for Pressure Injury Full thickness 11/23/21 1422   Number of days: 68       Percent of Wound(s) Debrided: approximately 100%    Total  Area  Debrided: 0.36 sq cm     Bleeding:  Minimal    Hemostasis Achieved:  by pressure    Procedural Pain:  0  / 10     Post Procedural Pain:  0 / 10     Response to treatment:  Well tolerated by patient. Status of wound progress and description from last visit:  Improving, continue regimen. .      Plan:       Discharge Instructions         Discharge Instructions        PHYSICIAN ORDERS AND DISCHARGE INSTRUCTIONS     NOTE: Upon discharge from the [address], you will receive a patient experience survey.  We would be grateful if you would take the time to fill this survey out.     Wound care order history:                 BAIRON's   Right   1.38    Left  0.94           Date 9/21/2021              Vascular studies:   Date               Imaging:   Date               Cultures:   Date obtained on 9/21/2021              Grafts:  Date               Antibiotics:               Earlier Wound care treatments:                          Primary care physician     Continuing wound care orders and information:              Residence:  Private               Continue home health care with: [facility]     Wound Medications:              BSSYK cleansing:                           LM not scrub or use excessive force.                          Wash hands with soap and water before and after dressing changes.                         Prior to applying a clean dressing, cleanse wound with normal saline,                                wound cleanser, or mild soap and water.                           Ask the physician or nurse before getting the wound(s) wet in a shower              Daily Wound management:                          Keep weight off wounds and reposition every 2 hours.                          KPJNF standing for long periods of time.                          PZIAZ wraps/stockings in AM and remove at bedtime.                          If swelling is present, elevate legs to the level of the heart or above for 30 minutes 4-5 times a day and/or when sitting.                                             When taking antibiotics take entire prescription as ordered by physician do not stop taking until medicine is all gone.                                                          Orders for this week: 2021                   Left Plantar Foot -- wash with soap and water, pat dry. Foam to medial/anterior  leg. Apply triple layer of Qwick (cut center out)  to periwound with steri strips to hold in place to offload. Gentamicin and stimulin powder to wound bed. Cover with [employer]. Wrap with coban 2 leave in place for 1 week      Right leg---TUBI F         Rx: [facility] [employer]  Consult:  Central [phone]          Follow up with Eli Palma CNP in 1 week in the wound care center  Call [phone] for any questions or concerns.   Date__________   Time____________               Treatment Note Wound 09/21/21 Foot Left;Plantar #1-Dressing/Treatment:  (foam. qwick.  gentamicin, stimulen powder, qwick coban 2 )    Written Patient Dismissal Instructions Given            Electronically signed by APPLE Levy CNP on 11/29/2021 at 9:12 AM

## 2021-11-30 ENCOUNTER — HOSPITAL ENCOUNTER (OUTPATIENT)
Dept: WOUND CARE | Age: 46
Discharge: HOME | End: 2021-11-30
Payer: MEDICARE

## 2021-11-30 VITALS
TEMPERATURE: 98.2 F | RESPIRATION RATE: 16 BRPM | HEART RATE: 100 BPM | DIASTOLIC BLOOD PRESSURE: 92 MMHG | SYSTOLIC BLOOD PRESSURE: 154 MMHG

## 2021-11-30 DIAGNOSIS — L97.425 DIABETIC ULCER OF LEFT MIDFOOT ASSOCIATED WITH TYPE 2 DIABETES MELLITUS, WITH MUSCLE INVOLVEMENT WITHOUT EVIDENCE OF NECROSIS (HCC): Primary | ICD-10-CM

## 2021-11-30 DIAGNOSIS — E11.621 DIABETIC ULCER OF LEFT MIDFOOT ASSOCIATED WITH TYPE 2 DIABETES MELLITUS, WITH MUSCLE INVOLVEMENT WITHOUT EVIDENCE OF NECROSIS (HCC): Primary | ICD-10-CM

## 2021-11-30 PROCEDURE — 11042 DBRDMT SUBQ TIS 1ST 20SQCM/<: CPT

## 2021-11-30 RX ORDER — BETAMETHASONE DIPROPIONATE 0.05 %
OINTMENT (GRAM) TOPICAL ONCE
Status: CANCELLED | OUTPATIENT
Start: 2021-11-30 | End: 2021-11-30

## 2021-11-30 RX ORDER — GENTAMICIN SULFATE 1 MG/G
OINTMENT TOPICAL ONCE
Status: CANCELLED | OUTPATIENT
Start: 2021-11-30 | End: 2021-11-30

## 2021-11-30 RX ORDER — BACITRACIN ZINC AND POLYMYXIN B SULFATE 500; 1000 [USP'U]/G; [USP'U]/G
OINTMENT TOPICAL ONCE
Status: CANCELLED | OUTPATIENT
Start: 2021-11-30 | End: 2021-11-30

## 2021-11-30 RX ORDER — CLOBETASOL PROPIONATE 0.5 MG/G
OINTMENT TOPICAL ONCE
Status: CANCELLED | OUTPATIENT
Start: 2021-11-30 | End: 2021-11-30

## 2021-11-30 RX ORDER — LIDOCAINE 50 MG/G
OINTMENT TOPICAL ONCE
Status: CANCELLED | OUTPATIENT
Start: 2021-11-30 | End: 2021-11-30

## 2021-11-30 RX ORDER — LIDOCAINE HYDROCHLORIDE 40 MG/ML
SOLUTION TOPICAL ONCE
Status: CANCELLED | OUTPATIENT
Start: 2021-11-30 | End: 2021-11-30

## 2021-11-30 RX ORDER — LIDOCAINE 40 MG/G
CREAM TOPICAL ONCE
Status: CANCELLED | OUTPATIENT
Start: 2021-11-30 | End: 2021-11-30

## 2021-11-30 RX ORDER — LIDOCAINE HYDROCHLORIDE 40 MG/ML
SOLUTION TOPICAL ONCE
Status: DISCONTINUED | OUTPATIENT
Start: 2021-11-30 | End: 2021-12-01 | Stop reason: HOSPADM

## 2021-11-30 RX ORDER — BACITRACIN, NEOMYCIN, POLYMYXIN B 400; 3.5; 5 [USP'U]/G; MG/G; [USP'U]/G
OINTMENT TOPICAL ONCE
Status: CANCELLED | OUTPATIENT
Start: 2021-11-30 | End: 2021-11-30

## 2021-11-30 RX ORDER — GINSENG 100 MG
CAPSULE ORAL ONCE
Status: CANCELLED | OUTPATIENT
Start: 2021-11-30 | End: 2021-11-30

## 2021-11-30 ASSESSMENT — PAIN - FUNCTIONAL ASSESSMENT: PAIN_FUNCTIONAL_ASSESSMENT: PREVENTS OR INTERFERES SOME ACTIVE ACTIVITIES AND ADLS

## 2021-11-30 ASSESSMENT — PAIN SCALES - GENERAL: PAINLEVEL_OUTOF10: 6

## 2021-11-30 ASSESSMENT — PAIN DESCRIPTION - FREQUENCY: FREQUENCY: CONTINUOUS

## 2021-11-30 ASSESSMENT — PAIN DESCRIPTION - ORIENTATION: ORIENTATION: LEFT

## 2021-11-30 ASSESSMENT — PAIN DESCRIPTION - DESCRIPTORS: DESCRIPTORS: SHARP;STABBING

## 2021-11-30 ASSESSMENT — PAIN DESCRIPTION - PROGRESSION: CLINICAL_PROGRESSION: NOT CHANGED

## 2021-11-30 ASSESSMENT — PAIN DESCRIPTION - ONSET: ONSET: ON-GOING

## 2021-11-30 ASSESSMENT — PAIN DESCRIPTION - LOCATION: LOCATION: FOOT

## 2021-11-30 ASSESSMENT — PAIN DESCRIPTION - PAIN TYPE: TYPE: ACUTE PAIN

## 2021-11-30 NOTE — PROGRESS NOTES
Multilayer Compression Wrap   (Not Unna) Below the Knee    NAME:  Mc Wolfe  YOB: 1975  MEDICAL RECORD NUMBER:  [id number]  DATE:  11/30/2021        TUBI TO RT LEG     Multilayer compression wrap: Removed old Multilayer wrap if indicated and wash leg with mild soap/water. Applied moisturizing agent to dry skin as needed. Applied primary and secondary dressing as ordered. Applied multilayered dressing below the knee to left lower leg. Instructed patient/caregiver not to remove dressing and to keep it clean and dry. Instructed patient/caregiver on complications to report to provider, such as pain, numbness in toes, heavy drainage, and slippage of dressing. Instructed patient on purpose of compression dressing and on activity and exercise recommendations.       Electronically signed by Kirsty Berrios RN on 11/30/2021 at 3:48 PM

## 2021-11-30 NOTE — PROGRESS NOTES
Wound Care Center Progress Note With Procedure    Ti Chris  AGE: 55 y.o. GENDER: male  : 1975  EPISODE DATE:  2021     Subjective:     Chief Complaint   Patient presents with    Wound Check     left leg          HISTORY of PRESENT ILLNESS      Ti Chris is a 55 y.o. male who presents today for follow up of wound sub 2nd metatarsal head left foot. Chronic in nature. Denies pain. Does admit to being diabetic with last A1c being 6.2%. Did a total contact cast last visit but does not like it. Feels like it constricts his leg. Wound has been present for years. Smokes some cigarettes a day as well as marijuana. PAST MEDICAL HISTORY        Diagnosis Date    Abscess     scrotal    Acute renal failure (ARF) ([address] 2019    Anxiety associated with depression     Anxiety associated with depression     Back pain 2012    Chest pain 2013    Diabetic nephropathy ([address]     Diabetic neuropathy ([address] 2011    Diabetic ulcer of left midfoot associated with type 2 diabetes mellitus, with fat layer exposed ([address] 2017    Diverticulosis     C scope + Dr. Liang Ortega DM (diabetes mellitus), type 2 ([address]     DR. 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 Liang Ortega Necrotizing fasciitis [facility])     Nose fracture     Panic attacks     Pericarditis     Hospitalized with s/p heart cath normal    Peripheral autonomic neuropathy due to diabetes mellitus ([address]     Axonal EMG- [facility], 2011    Sebaceous cyst 2011    URI (upper respiratory infection) 2012    WD-Wound, surgical, infected, initial encounter 2017    Wrist fracture        PAST SURGICAL HISTORY    Past Surgical History:   Procedure Laterality Date Nathan 53 CARDIAC CATHETERIZATION  2003, 2013    Normal (Dr. Ramesh Palma)    COLONOSCOPY  6/2/2011    Pandiverticulosis, Nonbleeding internal hemorrhoids, Repeat colonoscopy at age 48- Dr. Pily Walters    \"had reconstruction surgery on nose a year after the other nose surgery\"    OTHER SURGICAL HISTORY Right 05/22/2015    I & D right great toe with partial amputation    OTHER SURGICAL HISTORY  12/04/2017    inc and drainage of abcess    CT DEEP INCIS FOOT BONE INFECTN Left 9/22/2018    LEFT FOOT DEBRIDEMENT INCISION AND DRAINAGE TOP AND BOTTOM performed by Monico Nayak DPM at [facility] N/A 5/15/2021    SCROTAL AND PERINEAL DEBRIDEMENT performed by Joby Madrid MD at [facility] N/A 5/16/2021    SCROTAL RE-DEBRIDEMENT  INCISION AND DRAINAGE performed by Joby Madrid MD at [address] [place] [address]    sebaceous cyst removal times 4     TOE AMPUTATION      right great toe    TOE AMPUTATION Right 3/23/2019    TOE AMPUTATION RIGHT 5TH TOE performed by Monico Nayak DPM at [facility] TOE AMPUTATION Right 8/6/2019    TOE AMPUTATION RIGHT 4TH TOE performed by Monico Nayak DPM at [address] [place] [address] UPPER GASTROINTESTINAL ENDOSCOPY  06/2/2011    Mild gastritis with moderatley severe antritis, small hiatal hernia, Dr. Yahaira Coelho N/A 1/12/2019    EGD BIOPSY performed by Manolo Chin [facility] at [facility]    Family History   Problem Relation Age of Onset   Rebbecca Hinders Cancer Mother         ?site    Stroke Mother     Bleeding Prob Mother     Diabetes Father     Heart Disease Father     High Blood Pressure Father     Obesity Father     Kidney Disease Father     Diabetes Sister     High Blood Pressure Sister     Mental Illness Sister     Obesity Sister     High Blood Pressure Other     Mental Illness Other         bipolar    Other Son         cyst each by Does not apply route once for 1 dose. 1 kit 0     No current facility-administered medications on file prior to encounter. REVIEW OF SYSTEMS    Pertinent items are noted in HPI. Constitutional: Negative for systemic symptoms including fever, chills and malaise. Objective:      BP (!) 154/92   Pulse 100   Temp 98.2 °F (36.8 °C) (Temporal)   Resp 16     PHYSICAL EXAM      General: The patient is in no acute distress. Mental status:  Patient is appropriate, is  oriented to place and plan of care. Dermatologic exam: Visual inspection of the periwound reveals the skin to be dry  Wound exam: see wound description below in procedure note. Sub 2nd metatarsal head wound left foot. No probe to bone. Assessment:     Problem List Items Addressed This Visit     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 type 2 diabetes mellitus, with muscle involvement without evidence of necrosis ([facility] - Primary    Relevant Medications    lidocaine (XYLOCAINE) 4 % external solution    Other Relevant Orders    Initiate Outpatient Wound Care Protocol        Procedure Note    Indications:  Based on my examination of this patient's wound(s) today, sharp excision into necrotic subcutaneous tissue is required to promote healing and evaluate the extent of previous healing. Performed by: Shahid Frank DPM    Consent obtained: Yes    Time out taken:  Yes    Pain Control: Anesthetic  Anesthetic: 5% Lidocaine Ointment Topical     Debridement:Non-excisional Debridement    Using #15 blade scalpel the wound(s) was/were sharply debrided down through and including the removal of subcutaneous tissue.         Devitalized Tissue Debrided:  slough    Pre Debridement Measurements:  Are located in the Wound Documentation Flow Sheet    All active wounds listed below with today's date are evaluated  Wound(s)    debrided this date include # : 1     Post  Debridement Measurements:  Wound 12/03/17 Other (Comment) Foot Left (Active)   Number of days: 0416       Wound 12/08/17 #3 abdoment (onset 3 days ago) SURGICAL (Active)   Number of days: 1453       Wound 12/08/17 #4 Lt plantar (onset 6 months ago) DIABETIC DAVE 1 (Active)   Number of days: 1646       Wound 05/18/18 # 5 LEFT PLANTAR (ONSET 1 YEAR AGO) DM WAG 1 (Active)   Number of days: 3937       Wound 09/17/18 Left dorsal foot and 4th toe amp site 9/19/18 (Active)   Number of days: 1170       Incision 05/22/15 Foot Right (Active)   Number of days: 2384       Incision 12/04/17 Abdomen Mid (Active)   Number of days: 6256       Incision 09/22/18 Foot Left (Active)   Number of days: 8476       Wound 01/11/19 left plantar foot wound (Active)   Number of days: 1054       Wound 03/21/19 Toe (Comment  which one) Anterior;Right (Active)   Number of days: 985       Wound 03/21/19 Foot Left;Posterior hard callus area (Active)   Number of days: 985       Wound 03/24/21 Left;Plantar (Active)   Number of days: 251       Wound 05/13/21 Left;Plantar (Active)   Number of days: 201       Wound 05/17/21 Groin Right scrotum extends to lower buttock (Active)   Number of days: 197       Wound 09/21/21 Foot Left;Plantar #1 (Active)   Wound Image   11/23/21 1422   Wound Etiology Diabetic Dave 2 11/30/21 1504   Dressing Status New dressing applied 11/23/21 1507   Wound Cleansed Soap and water 11/30/21 1404   Offloading for Diabetic Foot Ulcers Felt or foam 11/23/21 1507   Wound Length (cm) 1 cm 11/30/21 1404   Wound Width (cm) 0.6 cm 11/30/21 1404   Wound Depth (cm) 0.5 cm 11/30/21 1404   Wound Surface Area (cm^2) 0.6 cm^2 11/30/21 1404   Change in Wound Size % (l*w) 94 11/30/21 1404   Wound Volume (cm^3) 0.3 cm^3 11/30/21 1404   Wound Healing % 97 11/30/21 1404   Post-Procedure Length (cm) 1 cm 11/30/21 1504   Post-Procedure Width (cm) 0.6 cm 11/30/21 1504   Post-Procedure Depth (cm) 0.5 cm 11/30/21 1504   Post-Procedure Surface Area (cm^2) 0.6 cm^2 11/30/21 1504   Post-Procedure Volume (cm^3) changes.                         Prior to applying a clean dressing, cleanse wound with normal saline,                                wound cleanser, or mild soap and water.                           Ask the physician or nurse before getting the wound(s) wet in a shower              Daily Wound management:                          Keep weight off wounds and reposition every 2 hours.                          MWPDX standing for long periods of time.                          MGWLX wraps/stockings in AM and remove at bedtime.                          If swelling is present, elevate legs to the level of the heart or above for 30 minutes 4-5 times a day and/or when sitting.                                             When taking antibiotics take entire prescription as ordered by physician do not stop taking until medicine is all gone.                                                          Orders for this week: 2021                   Left Plantar Foot -- wash with soap and water, pat dry. Foam to medial/anterior  leg. Apply Qwick (cut center out)  to periwound with steri strips to hold in place to offload. Gentamicin and stimulin powder to wound bed.  Cover with Qwick. Wrap with coban 2 leave in place for 1 week       Right leg---ROMAN GANDHI         Rx: [facility] [employer]  Consult:  Central [phone]           Follow up with Dr. Juan Hurtado 1 week in the wound care center  Call [phone] for any questions or concerns. Date__________   Time____________        Treatment Note Wound 21 Foot Left;Plantar #1-Dressing/Treatment:  (quick gentamicin stimulin qwick coban2)    Written Patient Dismissal Instructions Given       -Chronic sub 2nd metatarsal head wound left foot  -Pt does not want to do TCC anymore, doesn't like how it feels  -Can do forefoot offloading shoe and collagen dressing  -Discussed 2nd MHE with patient. He is interested in this. Will discuss more on next visit.   -Monitor off abx  -Follow up 1 week    Electronically signed by Chucky Weiss DPM on 11/30/2021 at 4:50 PM

## 2021-11-30 NOTE — PLAN OF CARE
Problem: Pain:  Goal: Pain level will decrease  Description: Pain level will decrease  11/30/2021 1547 by Nadia Taylor RN  Outcome: Ongoing  11/30/2021 1547 by Nadia Taylor RN  Outcome: Ongoing  Goal: Control of acute pain  Description: Control of acute pain  11/30/2021 1547 by Nadia Taylor RN  Outcome: Ongoing  11/30/2021 1547 by Nadia Taylor RN  Outcome: Ongoing  Goal: Control of chronic pain  Description: Control of chronic pain  11/30/2021 1547 by Nadia Taylor RN  Outcome: Ongoing  11/30/2021 1547 by Nadia Taylor RN  Outcome: Ongoing

## 2021-12-07 ENCOUNTER — HOSPITAL ENCOUNTER (OUTPATIENT)
Dept: WOUND CARE | Age: 46
Discharge: HOME | End: 2021-12-07
Payer: MEDICARE

## 2021-12-07 VITALS
DIASTOLIC BLOOD PRESSURE: 114 MMHG | SYSTOLIC BLOOD PRESSURE: 176 MMHG | HEART RATE: 97 BPM | TEMPERATURE: 96.6 F | RESPIRATION RATE: 16 BRPM

## 2021-12-07 DIAGNOSIS — L97.425 DIABETIC ULCER OF LEFT MIDFOOT ASSOCIATED WITH TYPE 2 DIABETES MELLITUS, WITH MUSCLE INVOLVEMENT WITHOUT EVIDENCE OF NECROSIS (HCC): Primary | ICD-10-CM

## 2021-12-07 DIAGNOSIS — E11.621 DIABETIC ULCER OF LEFT MIDFOOT ASSOCIATED WITH TYPE 2 DIABETES MELLITUS, WITH MUSCLE INVOLVEMENT WITHOUT EVIDENCE OF NECROSIS (HCC): Primary | ICD-10-CM

## 2021-12-07 PROCEDURE — 11042 DBRDMT SUBQ TIS 1ST 20SQCM/<: CPT

## 2021-12-07 RX ORDER — CLOBETASOL PROPIONATE 0.5 MG/G
OINTMENT TOPICAL ONCE
Status: CANCELLED | OUTPATIENT
Start: 2021-12-07 | End: 2021-12-07

## 2021-12-07 RX ORDER — LIDOCAINE 40 MG/G
CREAM TOPICAL ONCE
Status: CANCELLED | OUTPATIENT
Start: 2021-12-07 | End: 2021-12-07

## 2021-12-07 RX ORDER — BACITRACIN ZINC AND POLYMYXIN B SULFATE 500; 1000 [USP'U]/G; [USP'U]/G
OINTMENT TOPICAL ONCE
Status: CANCELLED | OUTPATIENT
Start: 2021-12-07 | End: 2021-12-07

## 2021-12-07 RX ORDER — LIDOCAINE 50 MG/G
OINTMENT TOPICAL ONCE
Status: DISCONTINUED | OUTPATIENT
Start: 2021-12-07 | End: 2021-12-08 | Stop reason: HOSPADM

## 2021-12-07 RX ORDER — GINSENG 100 MG
CAPSULE ORAL ONCE
Status: CANCELLED | OUTPATIENT
Start: 2021-12-07 | End: 2021-12-07

## 2021-12-07 RX ORDER — BETAMETHASONE DIPROPIONATE 0.05 %
OINTMENT (GRAM) TOPICAL ONCE
Status: CANCELLED | OUTPATIENT
Start: 2021-12-07 | End: 2021-12-07

## 2021-12-07 RX ORDER — GENTAMICIN SULFATE 1 MG/G
OINTMENT TOPICAL ONCE
Status: CANCELLED | OUTPATIENT
Start: 2021-12-07 | End: 2021-12-07

## 2021-12-07 RX ORDER — GENTAMICIN SULFATE 1 MG/G
OINTMENT TOPICAL ONCE
Status: DISCONTINUED | OUTPATIENT
Start: 2021-12-07 | End: 2021-12-08 | Stop reason: HOSPADM

## 2021-12-07 RX ORDER — BACITRACIN, NEOMYCIN, POLYMYXIN B 400; 3.5; 5 [USP'U]/G; MG/G; [USP'U]/G
OINTMENT TOPICAL ONCE
Status: CANCELLED | OUTPATIENT
Start: 2021-12-07 | End: 2021-12-07

## 2021-12-07 RX ORDER — LIDOCAINE HYDROCHLORIDE 40 MG/ML
SOLUTION TOPICAL ONCE
Status: CANCELLED | OUTPATIENT
Start: 2021-12-07 | End: 2021-12-07

## 2021-12-07 RX ORDER — LIDOCAINE 50 MG/G
OINTMENT TOPICAL ONCE
Status: CANCELLED | OUTPATIENT
Start: 2021-12-07 | End: 2021-12-07

## 2021-12-07 ASSESSMENT — PAIN DESCRIPTION - FREQUENCY: FREQUENCY: INTERMITTENT

## 2021-12-07 ASSESSMENT — PAIN SCALES - GENERAL: PAINLEVEL_OUTOF10: 6

## 2021-12-07 ASSESSMENT — PAIN - FUNCTIONAL ASSESSMENT: PAIN_FUNCTIONAL_ASSESSMENT: PREVENTS OR INTERFERES SOME ACTIVE ACTIVITIES AND ADLS

## 2021-12-07 ASSESSMENT — PAIN DESCRIPTION - ORIENTATION: ORIENTATION: LEFT

## 2021-12-07 ASSESSMENT — PAIN DESCRIPTION - LOCATION: LOCATION: FOOT

## 2021-12-07 ASSESSMENT — PAIN DESCRIPTION - PAIN TYPE: TYPE: ACUTE PAIN

## 2021-12-07 ASSESSMENT — PAIN DESCRIPTION - PROGRESSION: CLINICAL_PROGRESSION: NOT CHANGED

## 2021-12-07 ASSESSMENT — PAIN DESCRIPTION - DESCRIPTORS: DESCRIPTORS: THROBBING

## 2021-12-07 ASSESSMENT — PAIN DESCRIPTION - ONSET: ONSET: ON-GOING

## 2021-12-07 NOTE — PROGRESS NOTES
Multilayer Compression Wrap   (Not Unna) Below the Knee    NAME:  Jairon Ortiz OF BIRTH:  1975  MEDICAL RECORD NUMBER:  [id number]  DATE:  12/7/2021    Multilayer compression wrap: Removed old Multilayer wrap if indicated and wash leg with mild soap/water. Applied moisturizing agent to dry skin as needed. Applied primary and secondary dressing as ordered. Applied multilayered dressing below the knee to left lower leg. Instructed patient/caregiver not to remove dressing and to keep it clean and dry. Instructed patient/caregiver on complications to report to provider, such as pain, numbness in toes, heavy drainage, and slippage of dressing. Instructed patient on purpose of compression dressing and on activity and exercise recommendations.       Electronically signed by Negrita Brennan LPN on 95/9/3169 at 5:77 PM

## 2021-12-07 NOTE — PROGRESS NOTES
Wound Care Center Progress Note With Procedure    Shama Bhatt  AGE: 55 y.o. GENDER: male  : 1975  EPISODE DATE:  2021     Subjective:     Chief Complaint   Patient presents with    Wound Check     left foot          HISTORY of PRESENT ILLNESS      Shama Bhatt is a 55 y.o. male who presents today for wound evaluation of chronic subsecond metatarsal head wound. No worsening pain or drainage or signs of infection. Denies any constitutional symptoms. PAST MEDICAL HISTORY        Diagnosis Date    Abscess     scrotal    Acute renal failure (ARF) ([address] 2019    Anxiety associated with depression     Anxiety associated with depression     Back pain 2012    Chest pain 2013    Diabetic nephropathy ([address]     Diabetic neuropathy ([address] 2011    Diabetic ulcer of left midfoot associated with type 2 diabetes mellitus, with fat layer exposed ([address] 2017    Diverticulosis     C scope + Dr. Britta Hernandez DM (diabetes mellitus), type 2 ([address]     DR. 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 Britta Hernandez Necrotizing fasciitis [facility])     Nose fracture     Panic attacks     Pericarditis     Hospitalized with s/p heart cath normal    Peripheral autonomic neuropathy due to diabetes mellitus ([address]     Axonal EMG- [facility], 2011    Sebaceous cyst 2011    URI (upper respiratory infection) 2012    WD-Wound, surgical, infected, initial encounter 2017    Wrist fracture        PAST SURGICAL HISTORY    Past Surgical History:   Procedure Laterality Date   [address] 2013    Normal (Dr. Carmen Jackman)    COLONOSCOPY  2011    Pandiverticulosis, Nonbleeding internal hemorrhoids, Repeat colonoscopy at age 48- Dr. Perrin Last    \"had reconstruction surgery on nose a year after the other nose surgery\"    OTHER SURGICAL HISTORY Right 05/22/2015    I & D right great toe with partial amputation    OTHER SURGICAL HISTORY  12/04/2017    inc and drainage of abcess    OK DEEP INCIS FOOT BONE INFECTN Left 9/22/2018    LEFT FOOT DEBRIDEMENT INCISION AND DRAINAGE TOP AND BOTTOM performed by Migue Alonso DPM at [facility] N/A 5/15/2021    SCROTAL AND PERINEAL DEBRIDEMENT performed by Guido Felix MD at [facility] N/A 5/16/2021    SCROTAL RE-DEBRIDEMENT  INCISION AND DRAINAGE performed by Guido Felix MD at [address] [place] [address]    sebaceous cyst removal times 4     TOE AMPUTATION      right great toe    TOE AMPUTATION Right 3/23/2019    TOE AMPUTATION RIGHT 5TH TOE performed by Migue Alonso DPM at [facility] TOE AMPUTATION Right 8/6/2019    TOE AMPUTATION RIGHT 4TH TOE performed by Migue Alonso DPM at [facility] [address] ENDOSCOPY  06/2/2011    Mild gastritis with moderatley severe antritis, small hiatal hernia, Dr. Bunny Sam 47 N/A 1/12/2019    EGD BIOPSY performed by Elisabet Baron MD at [place] [address] History   Problem Relation Age of Onset   Rachelle Short Cancer Mother         ?site    Stroke Mother     Bleeding Prob Mother     Diabetes Father     Heart Disease Father     High Blood Pressure Father     Obesity Father     Kidney Disease Father     Diabetes Sister     High Blood Pressure Sister     Mental Illness Sister     Obesity Sister     High Blood Pressure Other     Mental Illness Other         bipolar    Other Son         cyst in ear canal    ADHD Daughter        SOCIAL HISTORY    Social History     Tobacco Use    Smoking status: Former Smoker     Years: 20.00     Quit date: 11/18/2017 Years since quittin.0    Smokeless tobacco: Never Used   Vaping Use    Vaping Use: Never used   Substance Use Topics    Alcohol use: Yes     Comment: occ    Drug use: Yes     Frequency: 7.0 times per week     Types: Marijuana (Weed)       ALLERGIES    Allergies   Allergen Reactions    Adhesive Tape Rash    Doxycycline Nausea And Vomiting    Reglan [Metoclopramide] Anxiety       MEDICATIONS    Current Outpatient Medications on File Prior to Encounter   Medication Sig Dispense Refill    apixaban (ELIQUIS) 2.5 MG TABS tablet Take 1 tablet by mouth 2 times daily 60 tablet 5    clopidogrel (PLAVIX) 75 MG tablet Take 1 tablet by mouth daily 30 tablet 3    insulin glargine (LANTUS) 100 UNIT/ML injection vial Inject 75 Units into the skin nightly      insulin aspart (NOVOLOG RELION) 100 UNIT/ML injection vial Inject 35 Units into the skin 3 times daily (before meals)      atorvastatin (LIPITOR) 40 MG tablet Take 1 tablet by mouth nightly 30 tablet 3    metoprolol tartrate (LOPRESSOR) 25 MG tablet Take 1 tablet by mouth 2 times daily 60 tablet 3    amLODIPine (NORVASC) 10 MG tablet Take 1 tablet by mouth daily 30 tablet 3    chlorthalidone (HYGROTON) 25 MG tablet Take 1 tablet by mouth daily 30 tablet 3    sertraline (ZOLOFT) 50 MG tablet Take 2 tablets by mouth daily 30 tablet 3    aspirin 81 MG EC tablet Take 1 tablet by mouth daily 30 tablet 3    linagliptin (TRADJENTA) 5 MG tablet Take 1 tablet by mouth daily 30 tablet 5    ondansetron (ZOFRAN) 4 MG tablet Take 1 tablet by mouth every 8 hours as needed for Nausea or Vomiting 20 tablet 0    Lancets MISC 1 each by Does not apply route daily 100 each 3    furosemide (LASIX) 40 MG tablet Take 1 tablet by mouth daily 30 tablet 1    glucose monitoring kit (FREESTYLE) monitoring kit 1 each by Does not apply route once for 1 dose. 1 kit 0     No current facility-administered medications on file prior to encounter.        REVIEW OF SYSTEMS    Pertinent items are noted in HPI. Constitutional: Negative for systemic symptoms including fever, chills and malaise. Objective:      BP (!) 176/114   Pulse 97   Temp 96.6 °F (35.9 °C) (Temporal)   Resp 16     PHYSICAL EXAM      General: The patient is in no acute distress. Mental status:  Patient is appropriate, is  oriented to place and plan of care. Dermatologic exam: Visual inspection of the periwound reveals the skin to be dry  Wound exam: see wound description below in procedure note. Chronic subsecond metatarsal head wound left foot. Patient does have a cavus foot structure with prominent plantarflexed forefoot       Assessment:     Problem List Items Addressed This Visit     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 type 2 diabetes mellitus, with muscle involvement without evidence of necrosis ([facility] [address] - Primary    Relevant Medications    lidocaine (XYLOCAINE) 5 % ointment (Start on 12/7/2021  3:00 PM)    gentamicin (GARAMYCIN) 0.1 % ointment (Start on 12/7/2021  3:15 PM)    Other Relevant Orders    Initiate Outpatient Wound Care Protocol        Procedure Note    Indications:  Based on my examination of this patient's wound(s) today, sharp excision into necrotic subcutaneous tissue is required to promote healing and evaluate the extent of previous healing. Performed by: Dayton Silva DPM    Consent obtained: Yes    Time out taken:  Yes    Pain Control: Anesthetic  Anesthetic: 5% Lidocaine Ointment Topical     Debridement:Non-excisional Debridement    Using #15 blade scalpel the wound(s) was/were sharply debrided down through and including the removal of subcutaneous tissue.         Devitalized Tissue Debrided:  callus    Pre Debridement Measurements:  Are located in the Wound Documentation Flow Sheet    All active wounds listed below with today's date are evaluated  Wound(s)    debrided this date include # : 1     Post  Debridement Measurements:  Wound 12/03/17 Other (Comment) Foot Left (Active)   Number of days: 3021       Wound 12/08/17 #3 abdoment (onset 3 days ago) SURGICAL (Active)   Number of days: 1460       Wound 12/08/17 #4 Lt plantar (onset 6 months ago) DIABETIC DAVE 1 (Active)   Number of days: 2996       Wound 05/18/18 # 5 LEFT PLANTAR (ONSET 1 YEAR AGO) DM WAG 1 (Active)   Number of days: 1299       Wound 09/17/18 Left dorsal foot and 4th toe amp site 9/19/18 (Active)   Number of days: 1176       Incision 05/22/15 Foot Right (Active)   Number of days: 7647       Incision 12/04/17 Abdomen Mid (Active)   Number of days: 9744       Incision 09/22/18 Foot Left (Active)   Number of days: 2457       Wound 01/11/19 left plantar foot wound (Active)   Number of days: 3178       Wound 03/21/19 Toe (Comment  which one) Anterior;Right (Active)   Number of days: 992       Wound 03/21/19 Foot Left;Posterior hard callus area (Active)   Number of days: 992       Wound 03/24/21 Left;Plantar (Active)   Number of days: 258       Wound 05/13/21 Left;Plantar (Active)   Number of days: 207       Wound 05/17/21 Groin Right scrotum extends to lower buttock (Active)   Number of days: 204       Wound 09/21/21 Foot Left;Plantar #1 (Active)   Wound Image   12/07/21 1432   Wound Etiology Diabetic Dave 2 12/07/21 1432   Dressing Status New dressing applied 11/23/21 1507   Wound Cleansed Soap and water 12/07/21 1432   Offloading for Diabetic Foot Ulcers Felt or foam 11/23/21 1507   Wound Length (cm) 1.1 cm 12/07/21 1432   Wound Width (cm) 0.3 cm 12/07/21 1432   Wound Depth (cm) 0.3 cm 12/07/21 1432   Wound Surface Area (cm^2) 0.33 cm^2 12/07/21 1432   Change in Wound Size % (l*w) 96.7 12/07/21 1432   Wound Volume (cm^3) 0.099 cm^3 12/07/21 1432   Wound Healing % 99 12/07/21 1432   Post-Procedure Length (cm) 1 cm 11/30/21 1504   Post-Procedure Width (cm) 0.6 cm 11/30/21 1504   Post-Procedure Depth (cm) 0.5 cm 11/30/21 1504   Post-Procedure Surface Area (cm^2) 0.6 cm^2 11/30/21 1504   Post-Procedure Volume (cm^3) 0.3 cm^3 11/30/21 1504   Distance Tunneling (cm) 0 cm 12/07/21 1432   Tunneling Position ___ O'Clock 0 12/07/21 1432   Undermining Starts ___ O'Clock 0 12/07/21 1432   Undermining Ends___ O'Clock 0 12/07/21 1432   Undermining Maxium Distance (cm) 0 12/07/21 1432   Wound Assessment Pink/red 12/07/21 1432   Drainage Amount Moderate 12/07/21 1432   Drainage Description Serosanguinous 12/07/21 1432   Odor None 12/07/21 1432   Shazia-wound Assessment Hyperkeratosis (callous) 12/07/21 1432   Margins Defined edges 12/07/21 1432   Wound Thickness Description not for Pressure Injury Full thickness 12/07/21 1432   Number of days: 77       Percent of Wound(s) Debrided: approximately 100%    Total  Area  Debrided:  2 sq cm     Bleeding:  None    Hemostasis Achieved:  not needed    Procedural Pain:  0  / 10     Post Procedural Pain:  0 / 10     Response to treatment:  Well tolerated by patient. Status of wound progress and description from last visit:   Improved. Plan:       Discharge Instructions       PHYSICIAN ORDERS AND DISCHARGE INSTRUCTIONS     NOTE: Upon discharge from the [address], you will receive a patient experience survey.  We would be grateful if you would take the time to fill this survey out.     Wound care order history:                 BAIRON's   Right   1.38    Left  0.94           Date 9/21/2021              Vascular studies:   Date               Imaging:   Date               Cultures:   Date obtained on 9/21/2021              Grafts:  Date               Antibiotics:               Earlier Wound care treatments:                          Primary care physician     Continuing wound care orders and information:              Residence:  Private               Continue [facility] health care with: [facility]     Wound Medications:              LDHTQ cleansing:                           US not scrub or use excessive force.                          Wash hands with soap and water before and after dressing changes.                         Prior to applying a clean dressing, cleanse wound with normal saline,                                wound cleanser, or mild soap and water.                           Ask the physician or nurse before getting the wound(s) wet in a shower              Daily Wound management:                          Keep weight off wounds and reposition every 2 hours.                          NNFVT standing for long periods of time.                          MXCOA wraps/stockings in AM and remove at bedtime.                          If swelling is present, elevate legs to the level of the heart or above for 30 minutes 4-5 times a day and/or when sitting.                                             When taking antibiotics take entire prescription as ordered by physician do not stop taking until medicine is all gone.                                                          Orders for this week: 2021                   Left Plantar Foot -- wash with soap and water, pat dry. Foam to medial/anterior  leg. Apply Qwick (cut center out)  to periwound with steri strips to hold in place to offload. Gentamicin and stimulin powder to wound bed.  Cover with Qwick. Wrap with coban 2 leave in place for 1 week       Right leg---ROMAN GANDHI         Rx: [facility] [employer]  Consult:  Central [phone]           Follow up with Dr. Kary Jang 1 week in the wound care center  Call [phone] for any questions or concerns. Date__________   Time____________        Treatment Note      Written Patient Dismissal Instructions Given          -Patient has chronic wound subsecond metatarsal head left foot. No signs of infection today. -Monitor off antibiotics. -Did discuss possible second tarsal head resection of the left foot as patient has had this wound for significant amount of time over a year. He says that he is interested in this at this time.  -Says that his last hemoglobin A1c was 6.3%.   Palpable pedal pulses are noted. No signs of active infection to the area. Patient says that he does smoke marijuana but only smokes cigarettes occasionally.  -Did explain to him that if we take this pressure-point off it should help this wound to heal but also explained that more pressure we put on the metatarsal heads adjacent to this 1 and he could develop another wound. He does understand this and he is amenable to proceeding with surgery at this time.  -We will have office reach out to him to schedule this procedure.  -Will reach out to primary care provider to make sure patient is optimized for surgical intervention.  -Follow-up 1 week for recheck    Surgery was discussed at length today with the patient. This included the potential risks, conditions as well as the postoperative course. The risks include but are not limited to: Pain, infection, swelling, worsening or no better. We rediscussed the risk for skin complications or wound complication/nerve related complications and/or bone related complications, hardware complications, bone healing complications, failure of procedure, recurrence, future surgery required, death, anesthesia risks. Blood clots as well as other numerous risks were discussed at length today. Patient understands all this and is agreeable. The postoperative course was explained in detail today as well as the weightbearing status, the need to keep the bandage clean, dry, and intact, and other post procedural specific care was all discussed at length. Postoperative instruction was given and reviewed with the patient. Surgical consents were reviewed today and signed appropriately. We discussed anticoagulation therapy and DVT prophylaxis. This was discussed at length. Proper prescription to be given to patient based on our discussion today. We discussed the signs and symptoms of DVT and PE at length.   We will plan for second metatarsal excision left foot once we are able to schedule it in the operating room.     Electronically signed by Paulo Schwarz DPM on 12/7/2021 at 2:52 PM

## 2021-12-14 ENCOUNTER — HOSPITAL ENCOUNTER (OUTPATIENT)
Dept: WOUND CARE | Age: 46
Discharge: HOME | End: 2021-12-14
Payer: MEDICARE

## 2021-12-14 VITALS
DIASTOLIC BLOOD PRESSURE: 70 MMHG | SYSTOLIC BLOOD PRESSURE: 118 MMHG | TEMPERATURE: 97 F | HEART RATE: 95 BPM | RESPIRATION RATE: 18 BRPM

## 2021-12-14 DIAGNOSIS — E11.621 DIABETIC ULCER OF LEFT MIDFOOT ASSOCIATED WITH TYPE 2 DIABETES MELLITUS, WITH MUSCLE INVOLVEMENT WITHOUT EVIDENCE OF NECROSIS (HCC): Primary | ICD-10-CM

## 2021-12-14 DIAGNOSIS — L97.425 DIABETIC ULCER OF LEFT MIDFOOT ASSOCIATED WITH TYPE 2 DIABETES MELLITUS, WITH MUSCLE INVOLVEMENT WITHOUT EVIDENCE OF NECROSIS (HCC): Primary | ICD-10-CM

## 2021-12-14 PROCEDURE — 11042 DBRDMT SUBQ TIS 1ST 20SQCM/<: CPT

## 2021-12-14 RX ORDER — BETAMETHASONE DIPROPIONATE 0.05 %
OINTMENT (GRAM) TOPICAL ONCE
Status: CANCELLED | OUTPATIENT
Start: 2021-12-14 | End: 2021-12-14

## 2021-12-14 RX ORDER — CLOBETASOL PROPIONATE 0.5 MG/G
OINTMENT TOPICAL ONCE
Status: CANCELLED | OUTPATIENT
Start: 2021-12-14 | End: 2021-12-14

## 2021-12-14 RX ORDER — GINSENG 100 MG
CAPSULE ORAL ONCE
Status: CANCELLED | OUTPATIENT
Start: 2021-12-14 | End: 2021-12-14

## 2021-12-14 RX ORDER — LIDOCAINE 50 MG/G
OINTMENT TOPICAL ONCE
Status: CANCELLED | OUTPATIENT
Start: 2021-12-14 | End: 2021-12-14

## 2021-12-14 RX ORDER — BACITRACIN ZINC AND POLYMYXIN B SULFATE 500; 1000 [USP'U]/G; [USP'U]/G
OINTMENT TOPICAL ONCE
Status: CANCELLED | OUTPATIENT
Start: 2021-12-14 | End: 2021-12-14

## 2021-12-14 RX ORDER — BACITRACIN, NEOMYCIN, POLYMYXIN B 400; 3.5; 5 [USP'U]/G; MG/G; [USP'U]/G
OINTMENT TOPICAL ONCE
Status: CANCELLED | OUTPATIENT
Start: 2021-12-14 | End: 2021-12-14

## 2021-12-14 RX ORDER — LIDOCAINE 40 MG/G
CREAM TOPICAL ONCE
Status: CANCELLED | OUTPATIENT
Start: 2021-12-14 | End: 2021-12-14

## 2021-12-14 RX ORDER — LIDOCAINE HYDROCHLORIDE 40 MG/ML
SOLUTION TOPICAL ONCE
Status: CANCELLED | OUTPATIENT
Start: 2021-12-14 | End: 2021-12-14

## 2021-12-14 RX ORDER — GENTAMICIN SULFATE 1 MG/G
OINTMENT TOPICAL ONCE
Status: DISCONTINUED | OUTPATIENT
Start: 2021-12-14 | End: 2021-12-15 | Stop reason: HOSPADM

## 2021-12-14 RX ORDER — LIDOCAINE 50 MG/G
OINTMENT TOPICAL ONCE
Status: DISCONTINUED | OUTPATIENT
Start: 2021-12-14 | End: 2021-12-15 | Stop reason: HOSPADM

## 2021-12-14 RX ORDER — GENTAMICIN SULFATE 1 MG/G
OINTMENT TOPICAL ONCE
Status: CANCELLED | OUTPATIENT
Start: 2021-12-14 | End: 2021-12-14

## 2021-12-14 NOTE — PROGRESS NOTES
Multilayer Compression Wrap   (Not Unna) Below the Knee    NAME:  Elba High OF BIRTH:  1975  MEDICAL RECORD NUMBER:  [id number]  DATE:  12/14/2021    Multilayer compression wrap: Removed old Multilayer wrap if indicated and wash leg with mild soap/water. Applied moisturizing agent to dry skin as needed. Applied primary and secondary dressing as ordered. Applied multilayered dressing below the knee to left lower leg. Instructed patient/caregiver not to remove dressing and to keep it clean and dry. Instructed patient/caregiver on complications to report to provider, such as pain, numbness in toes, heavy drainage, and slippage of dressing. Instructed patient on purpose of compression dressing and on activity and exercise recommendations.       Electronically signed by Justice Frias LPN on 18/47/9435 at 3:11 PM

## 2021-12-14 NOTE — PROGRESS NOTES
[facility]ound Care Center Progress Note With Procedure    Norvel Cheadle  AGE: 55 y.o. GENDER: male  : 1975  EPISODE DATE:  2021     Subjective:     Chief Complaint   Patient presents with    Wound Check     left leg          HISTORY of PRESENT ILLNESS      Norvel Cheadle is a 55 y.o. male who presents today for wound evaluation of chronic wound subsecond metatarsal head of the left foot. Patient denies any changes. Denies any pain. Denies any drainage. Denies any constitutional symptoms. PAST MEDICAL HISTORY        Diagnosis Date    Abscess     scrotal    Acute renal failure (ARF) ([address] 2019    Anxiety associated with depression     Anxiety associated with depression     Back pain 2012    Chest pain 2013    Diabetic nephropathy ([address]     Diabetic neuropathy ([address] 2011    Diabetic ulcer of left midfoot associated with type 2 diabetes mellitus, with fat layer exposed ([address] 2017    Diverticulosis     C scope + Dr. Miya Tobar DM (diabetes mellitus), type 2 ([address]     DR. 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 Miya Tobar Necrotizing fasciitis [facility])     Nose fracture     Panic attacks     Pericarditis     Hospitalized with s/p heart cath normal    Peripheral autonomic neuropathy due to diabetes mellitus ([address]     Axonal EMG- NCS, 2011    Sebaceous cyst 2011    URI (upper respiratory infection) 2012    WD-Wound, surgical, infected, initial encounter 2017    Wrist fracture 1986       PAST SURGICAL HISTORY    Past Surgical History:   Procedure Laterality Date    ABDOMEN SURGERY      CARDIAC CATHETERIZATION  2013    Normal (Dr. Liliana Giron)    COLONOSCOPY  2011    Pandiverticulosis, Nonbleeding internal hemorrhoids, Repeat colonoscopy at age 48- Dr. Naty Patel    \"had reconstruction surgery on nose a year after the other nose surgery\"    OTHER SURGICAL HISTORY Right 05/22/2015    I & D right great toe with partial amputation    OTHER SURGICAL HISTORY  12/04/2017    inc and drainage of abcess    PA DEEP INCIS FOOT BONE INFECTN Left 9/22/2018    LEFT FOOT DEBRIDEMENT INCISION AND DRAINAGE TOP AND BOTTOM performed by Marisa Salcedo DPM at [facility] N/A 5/15/2021    SCROTAL AND PERINEAL DEBRIDEMENT performed by Kayden Mena MD at [address] [place] [address] 5/16/2021    SCROTAL RE-DEBRIDEMENT  INCISION AND DRAINAGE performed by Kayden Mena MD at [address] [place] [address]    sebaceous cyst removal times 4     TOE AMPUTATION      right great toe    TOE AMPUTATION Right 3/23/2019    TOE AMPUTATION RIGHT 5TH TOE performed by Marisa Salcedo DPM at [facility] TOE AMPUTATION Right 8/6/2019    TOE AMPUTATION RIGHT 4TH TOE performed by Marisa Salcedo DPM at [facility] [address] ENDOSCOPY  06/2/2011    Mild gastritis with moderatley severe antritis, small hiatal hernia, Dr. Mary Anne Barraza N/A 1/12/2019    EGD BIOPSY performed by Atiya Shepard MD at [place] [address] History   Problem Relation Age of Onset   [facility] Cancer Mother         ?site    Stroke Mother     Bleeding Prob Mother     Diabetes Father     Heart Disease Father     High Blood Pressure Father     Obesity Father     Kidney Disease Father     Diabetes Sister     High Blood Pressure Sister     Mental Illness Sister     Obesity Sister     High Blood Pressure Other     Mental Illness Other         bipolar    Other Son         cyst in ear canal    ADHD Daughter        SOCIAL HISTORY    Social History     Tobacco Use    Smoking status: Former Smoker     Years: 20.00     Quit date: 2017     Years since quittin.0    Smokeless tobacco: Never Used   Vaping Use    Vaping Use: Never used   Substance Use Topics    Alcohol use: Yes     Comment: occ    Drug use: Yes     Frequency: 7.0 times per week     Types: Marijuana (Weed)       ALLERGIES    Allergies   Allergen Reactions    Adhesive Tape Rash    Doxycycline Nausea And Vomiting    Reglan [Metoclopramide] Anxiety       MEDICATIONS    Current Outpatient Medications on File Prior to Encounter   Medication Sig Dispense Refill    apixaban (ELIQUIS) 2.5 MG TABS tablet Take 1 tablet by mouth 2 times daily 60 tablet 5    clopidogrel (PLAVIX) 75 MG tablet Take 1 tablet by mouth daily 30 tablet 3    insulin glargine (LANTUS) 100 UNIT/ML injection vial Inject 75 Units into the skin nightly      insulin aspart (NOVOLOG RELION) 100 UNIT/ML injection vial Inject 35 Units into the skin 3 times daily (before meals)      atorvastatin (LIPITOR) 40 MG tablet Take 1 tablet by mouth nightly 30 tablet 3    metoprolol tartrate (LOPRESSOR) 25 MG tablet Take 1 tablet by mouth 2 times daily 60 tablet 3    amLODIPine (NORVASC) 10 MG tablet Take 1 tablet by mouth daily 30 tablet 3    chlorthalidone (HYGROTON) 25 MG tablet Take 1 tablet by mouth daily 30 tablet 3    sertraline (ZOLOFT) 50 MG tablet Take 2 tablets by mouth daily 30 tablet 3    aspirin 81 MG EC tablet Take 1 tablet by mouth daily 30 tablet 3    linagliptin (TRADJENTA) 5 MG tablet Take 1 tablet by mouth daily 30 tablet 5    ondansetron (ZOFRAN) 4 MG tablet Take 1 tablet by mouth every 8 hours as needed for Nausea or Vomiting 20 tablet 0    Lancets MISC 1 each by Does not apply route daily 100 each 3    furosemide (LASIX) 40 MG tablet Take 1 tablet by mouth daily 30 tablet 1    glucose monitoring kit (FREESTYLE) monitoring kit 1 each by Does not apply route once for 1 dose. 1 kit 0     No current facility-administered medications on file prior to encounter. REVIEW OF SYSTEMS    Pertinent items are noted in HPI. Constitutional: Negative for systemic symptoms including fever, chills and malaise. Objective:      /70   Pulse 95   Temp 97 °F (36.1 °C) (Temporal)   Resp 18     PHYSICAL EXAM      General: The patient is in no acute distress. Mental status:  Patient is appropriate, is  oriented to place and plan of care. Dermatologic exam: Visual inspection of the periwound reveals the skin to be dry  Wound exam: see wound description below in procedure note      Assessment:     Problem List Items Addressed This Visit     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 type 2 diabetes mellitus, with muscle involvement without evidence of necrosis ([address] - Primary    Relevant Medications    lidocaine (XYLOCAINE) 5 % ointment    gentamicin (GARAMYCIN) 0.1 % ointment    Other Relevant Orders    Initiate Outpatient Wound Care Protocol        Procedure Note    Indications:  Based on my examination of this patient's wound(s) today, sharp excision into necrotic subcutaneous tissue is required to promote healing and evaluate the extent of previous healing. Performed by: Shanell Huerta DPM    Consent obtained: Yes    Time out taken:  Yes    Pain Control: Anesthetic  Anesthetic: 5% Lidocaine Ointment Topical     Debridement:Non-excisional Debridement    Using #15 blade scalpel the wound(s) was/were sharply debrided down through and including the removal of subcutaneous tissue.         Devitalized Tissue Debrided:  callus    Pre Debridement Measurements:  Are located in the Wound Documentation Flow Sheet    All active wounds listed below with today's date are evaluated  Wound(s)    debrided this date include # : 1     Post  Debridement Measurements:  Wound 12/03/17 Other (Comment) Foot Left (Active)   Number of days: 1471       Wound 12/08/17 #3 abdoment (onset 3 days ago) SURGICAL (Active)   Number of days: 1467       Wound 12/08/17 #4 Lt plantar (onset 6 months ago) DIABETIC DAVE 1 (Active)   Number of days: 1467       Wound 05/18/18 # 5 LEFT PLANTAR (ONSET 1 YEAR AGO) DM WAG 1 (Active)   Number of days: 7190       Wound 09/17/18 Left dorsal foot and 4th toe amp site 9/19/18 (Active)   Number of days: 3831       Incision 05/22/15 Foot Right (Active)   Number of days: 2398       Incision 12/04/17 Abdomen Mid (Active)   Number of days: 7324       Incision 09/22/18 Foot Left (Active)   Number of days: 8062       Wound 01/11/19 left plantar foot wound (Active)   Number of days: 1068       Wound 03/21/19 Toe (Comment  which one) Anterior;Right (Active)   Number of days: 999       Wound 03/21/19 Foot Left;Posterior hard callus area (Active)   Number of days: 999       Wound 03/24/21 Left;Plantar (Active)   Number of days: 265       Wound 05/13/21 Left;Plantar (Active)   Number of days: 215       Wound 05/17/21 Groin Right scrotum extends to lower buttock (Active)   Number of days: 211       Wound 09/21/21 Foot Left;Plantar #1 (Active)   Wound Image   12/07/21 1432   Wound Etiology Diabetic Dave 2 12/14/21 1510   Dressing Status New dressing applied 12/14/21 1510   Wound Cleansed Soap and water 12/14/21 1436   Offloading for Diabetic Foot Ulcers Felt or foam 12/14/21 1510   Wound Length (cm) 0.7 cm 12/14/21 1436   Wound Width (cm) 0.3 cm 12/14/21 1436   Wound Depth (cm) 0.3 cm 12/14/21 1436   Wound Surface Area (cm^2) 0.21 cm^2 12/14/21 1436   Change in Wound Size % (l*w) 97.9 12/14/21 1436   Wound Volume (cm^3) 0.063 cm^3 12/14/21 1436   Wound Healing % 99 12/14/21 1436   Post-Procedure Length (cm) 0.7 cm 12/14/21 1457   Post-Procedure Width (cm) 0.3 cm 12/14/21 1457   Post-Procedure Depth (cm) 0.3 cm 12/14/21 1457   Post-Procedure Surface Area (cm^2) 0.21 cm^2 12/14/21 1457   Post-Procedure Volume (cm^3) 0.063 cm^3 12/14/21 1457   Distance Tunneling (cm) 0 cm 12/14/21 1436   Tunneling Position ___ O'Clock 0 12/14/21 1436   Undermining Starts ___ O'Clock 0 12/14/21 1436   Undermining Ends___ O'Clock 0 12/14/21 1436   Undermining Maxium Distance (cm) 0 12/14/21 1436   Wound Assessment Pink/red 12/14/21 1436   Drainage Amount Moderate 12/14/21 1436   Drainage Description Serosanguinous 12/14/21 1436   Odor None 12/14/21 1436   Shazia-wound Assessment Hyperkeratosis (callous) 12/14/21 1436   Margins Defined edges 12/14/21 1436   Wound Thickness Description not for Pressure Injury Full thickness 12/14/21 1436   Number of days: 84       Percent of Wound(s) Debrided: approximately 100%    Total  Area  Debrided:  2 sq cm     Bleeding:  None    Hemostasis Achieved:  not needed    Procedural Pain:  0  / 10     Post Procedural Pain:  0 / 10     Response to treatment:  Well tolerated by patient. Status of wound progress and description from last visit:   Stable. Plan:       Discharge Instructions         Discharge Instructions        PHYSICIAN ORDERS AND DISCHARGE INSTRUCTIONS     NOTE: Upon discharge from the [address], you will receive a patient experience survey. We would be grateful if you would take the time to fill this survey out.     Wound care order history:                 BAIRON's   Right   1.38    Left  0.94           Date 9/21/2021              Vascular studies:   Date               Imaging:   Date               Cultures:   Date obtained on 9/21/2021              Grafts:  Date               Antibiotics:               Earlier Wound care treatments:                          Primary care physician     Continuing wound care orders and information:              Residence:  Private               Continue [facility] care with: [facility]     Wound Medications:              GLCEZ cleansing:                           XT not scrub or use excessive force.                          Wash hands with soap and water before and after dressing changes.                           Prior to applying a clean dressing, cleanse wound with normal DPM on 12/14/2021 at 3:40 PM

## 2021-12-17 ENCOUNTER — HOSPITAL ENCOUNTER (OUTPATIENT)
Dept: LAB | Age: 46
Discharge: HOME | End: 2021-12-17
Payer: MEDICARE

## 2021-12-17 LAB
SARS-COV-2: NOT DETECTED
SOURCE: NORMAL

## 2021-12-17 PROCEDURE — U0005 INFEC AGEN DETEC AMPLI PROBE: HCPCS

## 2021-12-17 PROCEDURE — U0003 INFECTIOUS AGENT DETECTION BY NUCLEIC ACID (DNA OR RNA); SEVERE ACUTE RESPIRATORY SYNDROME CORONAVIRUS 2 (SARS-COV-2) (CORONAVIRUS DISEASE [COVID-19]), AMPLIFIED PROBE TECHNIQUE, MAKING USE OF HIGH THROUGHPUT TECHNOLOGIES AS DESCRIBED BY CMS-2020-01-R: HCPCS

## 2021-12-20 ENCOUNTER — ANESTHESIA EVENT (OUTPATIENT)
Dept: OPERATING ROOM | Age: 46
End: 2021-12-20
Payer: MEDICARE

## 2021-12-20 ASSESSMENT — LIFESTYLE VARIABLES: SMOKING_STATUS: 1

## 2021-12-20 NOTE — PROGRESS NOTES
Instructions reviewed with wife Fabi Click    Surgery 12/21/21 @ [phone], arrival 0745               1. Do not eat or drink anything after midnight - unless instructed by your doctor prior to surgery. This includes                   no water, chewing gum or mints. 2. Follow your directions as prescribed by the doctor for your procedure and medications. Take Amlodipine & Metoprolol in am with a sip. 3. Check with your Doctor regarding stopping vitamins, supplements, blood thinners (Plavix, Coumadin, Lovenox, Effient, Pradaxa, Xarelto, Fragmin or                   other blood thinners) and follow their instructions. Wife states has not been taking any medications (aspirin, Eliquis, Plavix) for at least a month. 4. Do not smoke, and do not drink any alcoholic beverages 24 hours prior to surgery. This includes NA Beer. No marijuana in 24-48 hours. 5. You may brush your teeth and gargle the morning of surgery. DO NOT SWALLOW WATER   6. You MUST make arrangements for a responsible adult to take you home after your surgery and be able to check on you every couple                   hours for the day. You will not be allowed to leave alone or drive yourself home. It is strongly suggested someone stay with you the first 24                   hrs. Your surgery will be cancelled if you do not have a ride home. 7. Please wear simple, loose fitting clothing to the hospital.  Mannie Banuelos not bring valuables (money, credit cards, checkbooks, etc.) Do not wear any                   makeup (including no eye makeup) or nail polish on your fingers or toes. 8. DO NOT wear any jewelry or piercings on day of surgery. All body piercing jewelry must be removed. 9. If you have dentures, they will be removed before going to the OR; we will provide you a container. If you wear contact lenses or glasses,                  they will be removed; please bring a case for them.            10. If you  have a Living Will and Durable Power of  for Healthcare, please bring in a copy. 11. Please bring picture ID,  insurance card, paperwork from the doctors office    (H & P, Consent, & card for implantable devices). 12. Take a shower the night before or morning of your procedure, do not apply any lotion, oil or powder. 13. Wear a mask covering your nose & mouth when entering the hospital. Have your covid-19 test performed within 2-7 days of your                  Surgery-negative covid test 12/17/21 . Quarantine yourself after the test until after your surgery.

## 2021-12-20 NOTE — ANESTHESIA PRE PROCEDURE
Department of Anesthesiology  Preprocedure Note       Name:  Roselyn Hager   Age:  55 y.o.  :  1975                                          MRN:  [id number]         Date:  2021      Surgeon: Kimberlee Pineda):  Domenico Joyce DPM    Procedure: Procedure(s):  EXCISION OF HEAD LEFT 2ND METATARSAL    Medications prior to admission:   Prior to Admission medications    Medication Sig Start Date End Date Taking?  Authorizing Provider   apixaban (ELIQUIS) 2.5 MG TABS tablet Take 1 tablet by mouth 2 times daily 10/1/21   Agapito Ko, APRN - CNP   clopidogrel (PLAVIX) 75 MG tablet Take 1 tablet by mouth daily 10/2/21   Agapito Ko, APRN - CNP   furosemide (LASIX) 40 MG tablet Take 1 tablet by mouth daily 9/22/21 10/22/21  Casper Mckeon MD   insulin glargine (LANTUS) 100 UNIT/ML injection vial Inject 75 Units into the skin nightly    Historical Provider, MD   insulin aspart (NOVOLOG RELION) 100 UNIT/ML injection vial Inject 35 Units into the skin 3 times daily (before meals)    Historical Provider, MD   atorvastatin (LIPITOR) 40 MG tablet Take 1 tablet by mouth nightly 6/15/21   Patricio Cyr MD   metoprolol tartrate (LOPRESSOR) 25 MG tablet Take 1 tablet by mouth 2 times daily 6/15/21   Patricio Cyr MD   amLODIPine (NORVASC) 10 MG tablet Take 1 tablet by mouth daily 6/15/21   Patricio Cyr MD   chlorthalidone (HYGROTON) 25 MG tablet Take 1 tablet by mouth daily 6/15/21   Patricio Cyr MD   sertraline (ZOLOFT) 50 MG tablet Take 2 tablets by mouth daily 21   Patricio Cyr MD   aspirin 81 MG EC tablet Take 1 tablet by mouth daily 21   Fredy Lama [place], MD   linagliptin (TRADJENTA) 5 MG tablet Take 1 tablet by mouth daily 3/29/21   Patricio Cyr MD   ondansetron (ZOFRAN) 4 MG tablet Take 1 tablet by mouth every 8 hours as needed for Nausea or Vomiting 18   Pastor Bert MD   Lancets MISC 1 each by Does not apply route daily 18   Pastor Bert MD   glucose monitoring kit (FREESTYLE) monitoring kit 1 each by Does not apply route once for 1 dose. 6/20/13 6/20/13  Akbar June MD       Current medications:    Current Outpatient Medications   Medication Sig Dispense Refill    apixaban (ELIQUIS) 2.5 MG TABS tablet Take 1 tablet by mouth 2 times daily 60 tablet 5    clopidogrel (PLAVIX) 75 MG tablet Take 1 tablet by mouth daily 30 tablet 3    furosemide (LASIX) 40 MG tablet Take 1 tablet by mouth daily 30 tablet 1    insulin glargine (LANTUS) 100 UNIT/ML injection vial Inject 75 Units into the skin nightly      insulin aspart (NOVOLOG RELION) 100 UNIT/ML injection vial Inject 35 Units into the skin 3 times daily (before meals)      atorvastatin (LIPITOR) 40 MG tablet Take 1 tablet by mouth nightly 30 tablet 3    metoprolol tartrate (LOPRESSOR) 25 MG tablet Take 1 tablet by mouth 2 times daily 60 tablet 3    amLODIPine (NORVASC) 10 MG tablet Take 1 tablet by mouth daily 30 tablet 3    chlorthalidone (HYGROTON) 25 MG tablet Take 1 tablet by mouth daily 30 tablet 3    sertraline (ZOLOFT) 50 MG tablet Take 2 tablets by mouth daily 30 tablet 3    aspirin 81 MG EC tablet Take 1 tablet by mouth daily 30 tablet 3    linagliptin (TRADJENTA) 5 MG tablet Take 1 tablet by mouth daily 30 tablet 5    ondansetron (ZOFRAN) 4 MG tablet Take 1 tablet by mouth every 8 hours as needed for Nausea or Vomiting 20 tablet 0    Lancets MISC 1 each by Does not apply route daily 100 each 3    glucose monitoring kit (FREESTYLE) monitoring kit 1 each by Does not apply route once for 1 dose. 1 kit 0     No current facility-administered medications for this visit. Allergies:     Allergies   Allergen Reactions    Adhesive Tape Rash    Doxycycline Nausea And Vomiting    Reglan [Metoclopramide] Anxiety       Problem List:    Patient Active Problem List   Diagnosis Code    Hiatal hernia K44.9    Diabetes mellitus type 2, improved controlled E11.65    Diabetic neuropathy ([facility]) E11.40    Panic attack F41.0    Anxiety associated with depression F41.8    Neck pain M54.2    DANIELA (obstructive sleep apnea) G47.33    Urinary frequency R35.0    Bilateral carpal tunnel syndrome- left worse than right G56.03    Hyperlipidemia with target LDL less than 100 E78.5    Essential hypertension I10    Bronchitis J40    Osteomyelitis ([facility]) M86.9    Abscess L29.08    Periumbilical hernia G28.2    Sepsis ([facility]) A41.9    Cellulitis of left foot L03. 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    Recurrent major depressive disorder, in full remission ([facility] F33.42    Generalized anxiety disorder F41.1    Morbid obesity with body mass index (BMI) of 40.0 to 44.9 in adult ([facility]) E66.01, Z68.41    Necrotizing fasciitis ([facility]) M72.6    Syncope and collapse R55    Right groin wound S31.109A    Ulcer of right groin with fat layer exposed ([address] L98.492    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 type 2 diabetes mellitus, with muscle involvement without evidence of necrosis ([facility]) E11.621, L97.425    Nephrotic syndrome N04.9    Generalized edema R60.1    Stage 3b chronic kidney disease (HCC) N18.32    Diabetic nephropathy associated with type 2 diabetes mellitus ([facility]) E11.21    Hypoalbuminemia E88.09    Chronic kidney disease, stage IV (severe) ([facility]) N18.4    FH: CAD (coronary artery disease) Z82.49    ASCVD (arteriosclerotic cardiovascular disease) I25.10    Other proteinuria R80.8       Past Medical History:        Diagnosis Date    Abscess 2010    scrotal    Acute renal failure (ARF) ([address] 08/04/2019    Anxiety associated with depression     Anxiety associated with depression     Back pain 07/02/2012    Chest pain 05/01/2013    Diabetic nephropathy ([address]     Diabetic neuropathy ([address] 12/22/2011    Diabetic ulcer of left midfoot associated with type 2 diabetes mellitus, with fat layer exposed ([address] 07/18/2017    Diverticulosis     C scope + Dr. Willa Walters DM (diabetes mellitus), type 2 ([address] 2002    DR. 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 Willa Walters Necrotizing fasciitis [facility])     Nose fracture 1988    Panic attacks     Pericarditis 2003    Hospitalized with s/p heart cath normal    Peripheral autonomic neuropathy due to diabetes mellitus ([address]     Axonal EMG- NCS, March 2011    Sebaceous cyst 09/01/2011    URI (upper respiratory infection) 02/27/2012    WD-Wound, surgical, infected, initial encounter 12/08/2017    Wrist fracture 1986       Past Surgical History:        Procedure Laterality Date   [address] 2003, 2013    Normal (Dr. Poncho Nobles)    COLONOSCOPY  6/2/2011    Pandiverticulosis, Nonbleeding internal hemorrhoids, Repeat colonoscopy at age 48- Dr. Katalina Mathews    \"had reconstruction surgery on nose a year after the other nose surgery\"    OTHER SURGICAL HISTORY Right 05/22/2015    I & D right great toe with partial amputation    OTHER SURGICAL HISTORY  12/04/2017    inc and drainage of abcess    CA DEEP INCIS FOOT BONE INFECTN Left 9/22/2018    LEFT FOOT DEBRIDEMENT INCISION AND DRAINAGE TOP AND BOTTOM performed by Lyla Salgado DPM at [facility] N/A 5/15/2021    SCROTAL AND PERINEAL DEBRIDEMENT performed by Sandro DeL eon MD at [address] [place] [address] 5/16/2021    SCROTAL RE-DEBRIDEMENT INCISION AND DRAINAGE performed by Molina Hernandez MD at [address] [place] [address] [id number]    sebaceous cyst removal times 4     TOE AMPUTATION      right great toe    TOE AMPUTATION Right 3/23/2019    TOE AMPUTATION RIGHT 5TH TOE performed by Nicolasa Guzman DPM at [facility] TOE AMPUTATION Right 2019    TOE AMPUTATION RIGHT 4TH TOE performed by Nicolasa Guzman DPM at [address] [place] [address] Se UPPER GASTROINTESTINAL ENDOSCOPY  2011    Mild gastritis with moderatley severe antritis, small hiatal hernia, Dr. Rebollar [address] N/A 2019    EGD BIOPSY performed by Jose Armando Yadav MD at [facility] ENDOSCOPY       Social History:    Social History     Tobacco Use    Smoking status: Former Smoker     Years: 20.00     Quit date: 2017     Years since quittin.0    Smokeless tobacco: Never Used   Substance Use Topics    Alcohol use: Yes     Comment: occ                                Counseling given: Not Answered      Vital Signs (Current): There were no vitals filed for this visit.                                            BP Readings from Last 3 Encounters:   21 118/70   21 (!) 176/114   21 (!) 154/92       NPO Status:                                                                                 BMI:   Wt Readings from Last 3 Encounters:   10/27/21 290 lb (131.5 kg)   10/06/21 288 lb (130.6 kg)   21 289 lb 11 oz (131.4 kg)     There is no height or weight on file to calculate BMI.    CBC:   Lab Results   Component Value Date    WBC 7.7 10/01/2021    RBC 3.76 10/01/2021    HGB 11.0 10/01/2021    HCT 33.2 10/01/2021    MCV 88.3 10/01/2021    RDW 12.5 10/01/2021     10/01/2021       CMP:   Lab Results   Component Value Date     10/18/2021    K 5.3 10/18/2021     10/18/2021    CO2 20 10/18/2021    BUN 61 10/18/2021    CREATININE 2.2 10/18/2021    GFRAA 39 10/18/2021    LABGLOM 32 10/18/2021    GLUCOSE 158 10/18/2021    PROT 6.0 09/17/2021    PROT 7.1 04/11/2012    CALCIUM 8.7 10/18/2021    BILITOT 0.2 09/17/2021    ALKPHOS 92 09/17/2021    AST 12 09/17/2021    ALT 11 09/17/2021       POC Tests:   No results for input(s): POCGLU, POCNA, POCK, POCCL, POCBUN, POCHEMO, POCHCT in the last 72 hours. Coags:   Lab Results   Component Value Date    PROTIME 11.1 09/30/2021    PROTIME 12.5 12/13/2010    INR 0.86 09/30/2021    APTT 30.8 03/22/2019       HCG (If Applicable): No results found for: PREGTESTUR, PREGSERUM, HCG, HCGQUANT     ABGs:   Lab Results   Component Value Date    PO2ART 119 06/18/2013    WLU8HEN 47.0 06/18/2013    AMX8FBH 24.2 06/18/2013    BEART 2 06/18/2013        Type & Screen (If Applicable):  No results found for: LABABO, LABRH    Drug/Infectious Status (If Applicable):  Lab Results   Component Value Date    HEPCAB NON REACTIVE 03/28/2021       COVID-19 Screening (If Applicable):   Lab Results   Component Value Date    COVID19 NOT DETECTED 12/17/2021           Anesthesia Evaluation  Patient summary reviewed  Airway: Mallampati: II  TM distance: >3 FB   Neck ROM: full  Comment: beard  Mouth opening: > = 3 FB Dental:      Comment: Very poor dentition and periodontal disease. Multiple loose, chipped, and broken teeth. Pulmonary:normal exam    (+) sleep apnea:  asthma: current smoker                          ROS comment: thc daily    Cardiovascular:  Exercise tolerance: good (>4 METS),   (+) hypertension:, angina:, CAD:, CABG/stent:, hyperlipidemia            Echocardiogram reviewed               ROS comment: Left ventricular systolic function is normal.   Ejection fraction is visually estimated at 55-60%. Mild left ventricular hypertrophy. Mildly dilated right ventricle. RVSP of 20 mmHG   Sclerotic, but non-stenotic aortic valve. No evidence of any pericardial effusion.      Neuro/Psych:   (+) psychiatric history:depression/anxiety              ROS comment: Neuropathy   etoh  GI/Hepatic/Renal: (+) hiatal hernia, renal disease: CRI, morbid obesity          Endo/Other:    (+) DiabetesType II DM, poorly controlled, using insulin, blood dyscrasia: anticoagulation therapy and anemia:., electrolyte abnormalities, . Pt had no PAT visit       Abdominal:   (+) obese,           Vascular: Other Findings:             Anesthesia Plan      MAC     ASA 3       Induction: intravenous. MIPS: Postoperative opioids intended and Prophylactic antiemetics administered. Anesthetic plan and risks discussed with patient. Plan discussed with CRNA.               APPLE Costello - CRNA   12/20/2021

## 2021-12-20 NOTE — PROGRESS NOTES
12/20/21 - Unable to contact patient or leave a message on his & spouse's phone. Patient is on Eliquis, unable to verify if he stopped. The office (Eliu Thayer) was notified patient is on Eliquis and I am unable to contact patient with instructions.

## 2021-12-21 ENCOUNTER — HOSPITAL ENCOUNTER (OUTPATIENT)
Age: 46
Setting detail: OBSERVATION
Discharge: HOME | End: 2021-12-22
Attending: STUDENT IN AN ORGANIZED HEALTH CARE EDUCATION/TRAINING PROGRAM | Admitting: STUDENT IN AN ORGANIZED HEALTH CARE EDUCATION/TRAINING PROGRAM
Payer: MEDICARE

## 2021-12-21 ENCOUNTER — ANESTHESIA (OUTPATIENT)
Dept: OPERATING ROOM | Age: 46
End: 2021-12-21
Payer: MEDICARE

## 2021-12-21 VITALS
TEMPERATURE: 98.6 F | OXYGEN SATURATION: 99 % | RESPIRATION RATE: 18 BRPM | DIASTOLIC BLOOD PRESSURE: 70 MMHG | SYSTOLIC BLOOD PRESSURE: 101 MMHG

## 2021-12-21 DIAGNOSIS — L97.425 DIABETIC ULCER OF LEFT MIDFOOT ASSOCIATED WITH TYPE 2 DIABETES MELLITUS, WITH MUSCLE INVOLVEMENT WITHOUT EVIDENCE OF NECROSIS (HCC): Primary | ICD-10-CM

## 2021-12-21 DIAGNOSIS — E11.621 DIABETIC ULCER OF LEFT MIDFOOT ASSOCIATED WITH TYPE 2 DIABETES MELLITUS, WITH MUSCLE INVOLVEMENT WITHOUT EVIDENCE OF NECROSIS (HCC): Primary | ICD-10-CM

## 2021-12-21 PROBLEM — R07.9 CHEST PAIN: Status: ACTIVE | Noted: 2021-12-21

## 2021-12-21 LAB
D DIMER: 268 NG/ML(DDU)
EKG ATRIAL RATE: 82 BPM
EKG DIAGNOSIS: NORMAL
EKG P AXIS: 34 DEGREES
EKG P-R INTERVAL: 174 MS
EKG Q-T INTERVAL: 380 MS
EKG QRS DURATION: 78 MS
EKG QTC CALCULATION (BAZETT): 443 MS
EKG R AXIS: 12 DEGREES
EKG T AXIS: 58 DEGREES
EKG VENTRICULAR RATE: 82 BPM
ESTIMATED AVERAGE GLUCOSE: 217 MG/DL
GLUCOSE BLD-MCNC: 225 MG/DL (ref 70–99)
GLUCOSE BLD-MCNC: 238 MG/DL (ref 70–99)
GLUCOSE BLD-MCNC: 262 MG/DL (ref 70–99)
GLUCOSE BLD-MCNC: 266 MG/DL (ref 70–99)
GLUCOSE BLD-MCNC: 293 MG/DL (ref 70–99)
HBA1C MFR BLD: 9.2 % (ref 4.2–6.3)
PRO-BNP: 1031 PG/ML
TROPONIN T: 0.04 NG/ML

## 2021-12-21 PROCEDURE — G0378 HOSPITAL OBSERVATION PER HR: HCPCS

## 2021-12-21 PROCEDURE — 93005 ELECTROCARDIOGRAM TRACING: CPT | Performed by: ANESTHESIOLOGY

## 2021-12-21 PROCEDURE — 85379 FIBRIN DEGRADATION QUANT: CPT

## 2021-12-21 PROCEDURE — 7100000000 HC PACU RECOVERY - FIRST 15 MIN: Performed by: STUDENT IN AN ORGANIZED HEALTH CARE EDUCATION/TRAINING PROGRAM

## 2021-12-21 PROCEDURE — 82962 GLUCOSE BLOOD TEST: CPT

## 2021-12-21 PROCEDURE — 3700000000 HC ANESTHESIA ATTENDED CARE: Performed by: STUDENT IN AN ORGANIZED HEALTH CARE EDUCATION/TRAINING PROGRAM

## 2021-12-21 PROCEDURE — 6370000000 HC RX 637 (ALT 250 FOR IP): Performed by: FAMILY MEDICINE

## 2021-12-21 PROCEDURE — 6370000000 HC RX 637 (ALT 250 FOR IP): Performed by: ANESTHESIOLOGY

## 2021-12-21 PROCEDURE — 83880 ASSAY OF NATRIURETIC PEPTIDE: CPT

## 2021-12-21 PROCEDURE — 3700000001 HC ADD 15 MINUTES (ANESTHESIA): Performed by: STUDENT IN AN ORGANIZED HEALTH CARE EDUCATION/TRAINING PROGRAM

## 2021-12-21 PROCEDURE — 2580000003 HC RX 258: Performed by: ANESTHESIOLOGY

## 2021-12-21 PROCEDURE — 96374 THER/PROPH/DIAG INJ IV PUSH: CPT

## 2021-12-21 PROCEDURE — 99213 OFFICE O/P EST LOW 20 MIN: CPT | Performed by: INTERNAL MEDICINE

## 2021-12-21 PROCEDURE — 2500000003 HC RX 250 WO HCPCS: Performed by: STUDENT IN AN ORGANIZED HEALTH CARE EDUCATION/TRAINING PROGRAM

## 2021-12-21 PROCEDURE — 84484 ASSAY OF TROPONIN QUANT: CPT

## 2021-12-21 PROCEDURE — 3600000003 HC SURGERY LEVEL 3 BASE: Performed by: STUDENT IN AN ORGANIZED HEALTH CARE EDUCATION/TRAINING PROGRAM

## 2021-12-21 PROCEDURE — 93010 ELECTROCARDIOGRAM REPORT: CPT | Performed by: INTERNAL MEDICINE

## 2021-12-21 PROCEDURE — 2720000010 HC SURG SUPPLY STERILE: Performed by: STUDENT IN AN ORGANIZED HEALTH CARE EDUCATION/TRAINING PROGRAM

## 2021-12-21 PROCEDURE — 2709999900 HC NON-CHARGEABLE SUPPLY: Performed by: STUDENT IN AN ORGANIZED HEALTH CARE EDUCATION/TRAINING PROGRAM

## 2021-12-21 PROCEDURE — 7100000001 HC PACU RECOVERY - ADDTL 15 MIN: Performed by: STUDENT IN AN ORGANIZED HEALTH CARE EDUCATION/TRAINING PROGRAM

## 2021-12-21 PROCEDURE — 96375 TX/PRO/DX INJ NEW DRUG ADDON: CPT

## 2021-12-21 PROCEDURE — 6360000002 HC RX W HCPCS: Performed by: ANESTHESIOLOGY

## 2021-12-21 PROCEDURE — 6360000002 HC RX W HCPCS

## 2021-12-21 PROCEDURE — 3600000013 HC SURGERY LEVEL 3 ADDTL 15MIN: Performed by: STUDENT IN AN ORGANIZED HEALTH CARE EDUCATION/TRAINING PROGRAM

## 2021-12-21 PROCEDURE — 83036 HEMOGLOBIN GLYCOSYLATED A1C: CPT

## 2021-12-21 RX ORDER — FENTANYL CITRATE 50 UG/ML
INJECTION, SOLUTION INTRAMUSCULAR; INTRAVENOUS PRN
Status: DISCONTINUED | OUTPATIENT
Start: 2021-12-21 | End: 2021-12-21 | Stop reason: SDUPTHER

## 2021-12-21 RX ORDER — PROMETHAZINE HYDROCHLORIDE 12.5 MG/1
12.5 TABLET ORAL EVERY 6 HOURS PRN
Status: DISCONTINUED | OUTPATIENT
Start: 2021-12-21 | End: 2021-12-22 | Stop reason: HOSPADM

## 2021-12-21 RX ORDER — LIDOCAINE HYDROCHLORIDE 20 MG/ML
INJECTION, SOLUTION INTRAVENOUS PRN
Status: DISCONTINUED | OUTPATIENT
Start: 2021-12-21 | End: 2021-12-21 | Stop reason: SDUPTHER

## 2021-12-21 RX ORDER — HYDROMORPHONE HCL 110MG/55ML
0.5 PATIENT CONTROLLED ANALGESIA SYRINGE INTRAVENOUS ONCE
Status: COMPLETED | OUTPATIENT
Start: 2021-12-21 | End: 2021-12-21

## 2021-12-21 RX ORDER — CALCIUM CARBONATE 200(500)MG
750 TABLET,CHEWABLE ORAL 3 TIMES DAILY PRN
Status: DISCONTINUED | OUTPATIENT
Start: 2021-12-21 | End: 2021-12-22 | Stop reason: HOSPADM

## 2021-12-21 RX ORDER — CEFAZOLIN SODIUM 2 G/50ML
SOLUTION INTRAVENOUS PRN
Status: DISCONTINUED | OUTPATIENT
Start: 2021-12-21 | End: 2021-12-21 | Stop reason: SDUPTHER

## 2021-12-21 RX ORDER — FUROSEMIDE 20 MG/1
40 TABLET ORAL DAILY
Status: DISCONTINUED | OUTPATIENT
Start: 2021-12-21 | End: 2021-12-22 | Stop reason: HOSPADM

## 2021-12-21 RX ORDER — ONDANSETRON 4 MG/1
4 TABLET, FILM COATED ORAL EVERY 8 HOURS PRN
Status: DISCONTINUED | OUTPATIENT
Start: 2021-12-21 | End: 2021-12-22 | Stop reason: HOSPADM

## 2021-12-21 RX ORDER — PROMETHAZINE HYDROCHLORIDE 12.5 MG/1
12.5 TABLET ORAL EVERY 6 HOURS PRN
Status: DISCONTINUED | OUTPATIENT
Start: 2021-12-21 | End: 2021-12-21 | Stop reason: SDUPTHER

## 2021-12-21 RX ORDER — MAGNESIUM SULFATE IN WATER 40 MG/ML
2000 INJECTION, SOLUTION INTRAVENOUS PRN
Status: DISCONTINUED | OUTPATIENT
Start: 2021-12-21 | End: 2021-12-22 | Stop reason: HOSPADM

## 2021-12-21 RX ORDER — BUPIVACAINE HYDROCHLORIDE 2.5 MG/ML
INJECTION, SOLUTION EPIDURAL; INFILTRATION; INTRACAUDAL
Status: COMPLETED | OUTPATIENT
Start: 2021-12-21 | End: 2021-12-21

## 2021-12-21 RX ORDER — BENZONATATE 100 MG/1
200 CAPSULE ORAL 3 TIMES DAILY PRN
Status: DISCONTINUED | OUTPATIENT
Start: 2021-12-21 | End: 2021-12-22 | Stop reason: HOSPADM

## 2021-12-21 RX ORDER — ONDANSETRON 2 MG/ML
4 INJECTION INTRAMUSCULAR; INTRAVENOUS EVERY 6 HOURS PRN
Status: DISCONTINUED | OUTPATIENT
Start: 2021-12-21 | End: 2021-12-22 | Stop reason: HOSPADM

## 2021-12-21 RX ORDER — INSULIN GLARGINE 100 [IU]/ML
60 INJECTION, SOLUTION SUBCUTANEOUS NIGHTLY
Status: DISCONTINUED | OUTPATIENT
Start: 2021-12-21 | End: 2021-12-22 | Stop reason: HOSPADM

## 2021-12-21 RX ORDER — OXYCODONE HYDROCHLORIDE AND ACETAMINOPHEN 5; 325 MG/1; MG/1
1 TABLET ORAL EVERY 6 HOURS PRN
Status: DISCONTINUED | OUTPATIENT
Start: 2021-12-21 | End: 2021-12-22 | Stop reason: HOSPADM

## 2021-12-21 RX ORDER — NITROGLYCERIN 0.4 MG/1
0.4 TABLET SUBLINGUAL EVERY 5 MIN PRN
Status: DISCONTINUED | OUTPATIENT
Start: 2021-12-21 | End: 2021-12-22 | Stop reason: HOSPADM

## 2021-12-21 RX ORDER — ZOLPIDEM TARTRATE 5 MG/1
5 TABLET ORAL NIGHTLY PRN
Status: DISCONTINUED | OUTPATIENT
Start: 2021-12-21 | End: 2021-12-22 | Stop reason: HOSPADM

## 2021-12-21 RX ORDER — ASPIRIN 81 MG/1
81 TABLET ORAL DAILY
Status: DISCONTINUED | OUTPATIENT
Start: 2021-12-21 | End: 2021-12-22 | Stop reason: HOSPADM

## 2021-12-21 RX ORDER — ATORVASTATIN CALCIUM 20 MG/1
40 TABLET, FILM COATED ORAL NIGHTLY
Status: DISCONTINUED | OUTPATIENT
Start: 2021-12-21 | End: 2021-12-22 | Stop reason: HOSPADM

## 2021-12-21 RX ORDER — HYDROCODONE BITARTRATE AND ACETAMINOPHEN 5; 325 MG/1; MG/1
1 TABLET ORAL EVERY 6 HOURS PRN
Qty: 20 TABLET | Refills: 0 | Status: SHIPPED | OUTPATIENT
Start: 2021-12-21 | End: 2021-12-26

## 2021-12-21 RX ORDER — CLOPIDOGREL BISULFATE 75 MG/1
75 TABLET ORAL DAILY
Status: DISCONTINUED | OUTPATIENT
Start: 2021-12-21 | End: 2021-12-22 | Stop reason: HOSPADM

## 2021-12-21 RX ORDER — AMLODIPINE BESYLATE 5 MG/1
10 TABLET ORAL DAILY
Status: DISCONTINUED | OUTPATIENT
Start: 2021-12-21 | End: 2021-12-22 | Stop reason: HOSPADM

## 2021-12-21 RX ORDER — ATORVASTATIN CALCIUM 20 MG/1
40 TABLET, FILM COATED ORAL NIGHTLY
Status: DISCONTINUED | OUTPATIENT
Start: 2021-12-21 | End: 2021-12-21 | Stop reason: SDUPTHER

## 2021-12-21 RX ORDER — LACTULOSE 10 G/15ML
20 SOLUTION ORAL 2 TIMES DAILY PRN
Status: DISCONTINUED | OUTPATIENT
Start: 2021-12-21 | End: 2021-12-22 | Stop reason: HOSPADM

## 2021-12-21 RX ORDER — POTASSIUM CHLORIDE 7.45 MG/ML
10 INJECTION INTRAVENOUS PRN
Status: DISCONTINUED | OUTPATIENT
Start: 2021-12-21 | End: 2021-12-22 | Stop reason: HOSPADM

## 2021-12-21 RX ORDER — SERTRALINE HYDROCHLORIDE 100 MG/1
100 TABLET, FILM COATED ORAL DAILY
Status: DISCONTINUED | OUTPATIENT
Start: 2021-12-21 | End: 2021-12-22 | Stop reason: HOSPADM

## 2021-12-21 RX ORDER — GUAIFENESIN/DEXTROMETHORPHAN 100-10MG/5
10 SYRUP ORAL EVERY 6 HOURS PRN
Status: DISCONTINUED | OUTPATIENT
Start: 2021-12-21 | End: 2021-12-22 | Stop reason: HOSPADM

## 2021-12-21 RX ORDER — SULFAMETHOXAZOLE AND TRIMETHOPRIM 800; 160 MG/1; MG/1
1 TABLET ORAL 2 TIMES DAILY
Qty: 14 TABLET | Refills: 0 | Status: SHIPPED | OUTPATIENT
Start: 2021-12-21 | End: 2021-12-28

## 2021-12-21 RX ORDER — MIDAZOLAM HYDROCHLORIDE 1 MG/ML
INJECTION INTRAMUSCULAR; INTRAVENOUS PRN
Status: DISCONTINUED | OUTPATIENT
Start: 2021-12-21 | End: 2021-12-21 | Stop reason: SDUPTHER

## 2021-12-21 RX ORDER — SODIUM CHLORIDE, SODIUM LACTATE, POTASSIUM CHLORIDE, CALCIUM CHLORIDE 600; 310; 30; 20 MG/100ML; MG/100ML; MG/100ML; MG/100ML
INJECTION, SOLUTION INTRAVENOUS CONTINUOUS
Status: DISCONTINUED | OUTPATIENT
Start: 2021-12-21 | End: 2021-12-22 | Stop reason: HOSPADM

## 2021-12-21 RX ORDER — GUAIFENESIN 600 MG/1
1200 TABLET, EXTENDED RELEASE ORAL 2 TIMES DAILY
Status: DISCONTINUED | OUTPATIENT
Start: 2021-12-21 | End: 2021-12-22 | Stop reason: HOSPADM

## 2021-12-21 RX ORDER — CHLORTHALIDONE 25 MG/1
25 TABLET ORAL DAILY
Status: DISCONTINUED | OUTPATIENT
Start: 2021-12-21 | End: 2021-12-22 | Stop reason: HOSPADM

## 2021-12-21 RX ORDER — DEXTROSE MONOHYDRATE 50 MG/ML
100 INJECTION, SOLUTION INTRAVENOUS PRN
Status: DISCONTINUED | OUTPATIENT
Start: 2021-12-21 | End: 2021-12-22 | Stop reason: HOSPADM

## 2021-12-21 RX ORDER — PROPOFOL 10 MG/ML
INJECTION, EMULSION INTRAVENOUS PRN
Status: DISCONTINUED | OUTPATIENT
Start: 2021-12-21 | End: 2021-12-21 | Stop reason: SDUPTHER

## 2021-12-21 RX ORDER — DEXTROSE MONOHYDRATE 25 G/50ML
12.5 INJECTION, SOLUTION INTRAVENOUS PRN
Status: DISCONTINUED | OUTPATIENT
Start: 2021-12-21 | End: 2021-12-22 | Stop reason: HOSPADM

## 2021-12-21 RX ORDER — NICOTINE POLACRILEX 4 MG
15 LOZENGE BUCCAL PRN
Status: DISCONTINUED | OUTPATIENT
Start: 2021-12-21 | End: 2021-12-22 | Stop reason: HOSPADM

## 2021-12-21 RX ORDER — LOPERAMIDE HYDROCHLORIDE 2 MG/1
2 CAPSULE ORAL 4 TIMES DAILY PRN
Status: DISCONTINUED | OUTPATIENT
Start: 2021-12-21 | End: 2021-12-22 | Stop reason: HOSPADM

## 2021-12-21 RX ADMIN — LIDOCAINE HYDROCHLORIDE 50 MG: 20 INJECTION, SOLUTION INTRAVENOUS at 11:32

## 2021-12-21 RX ADMIN — FENTANYL CITRATE 25 MCG: 50 INJECTION, SOLUTION INTRAMUSCULAR; INTRAVENOUS at 11:51

## 2021-12-21 RX ADMIN — SODIUM CHLORIDE, POTASSIUM CHLORIDE, SODIUM LACTATE AND CALCIUM CHLORIDE: 600; 310; 30; 20 INJECTION, SOLUTION INTRAVENOUS at 16:30

## 2021-12-21 RX ADMIN — CEFAZOLIN SODIUM 2000 MG: 2 SOLUTION INTRAVENOUS at 11:33

## 2021-12-21 RX ADMIN — INSULIN LISPRO 20 UNITS: 100 INJECTION, SOLUTION INTRAVENOUS; SUBCUTANEOUS at 18:53

## 2021-12-21 RX ADMIN — HUMAN INSULIN 8 UNITS: 100 INJECTION, SOLUTION SUBCUTANEOUS at 08:26

## 2021-12-21 RX ADMIN — MIDAZOLAM 2 MG: 1 INJECTION INTRAMUSCULAR; INTRAVENOUS at 11:29

## 2021-12-21 RX ADMIN — OXYCODONE AND ACETAMINOPHEN 1 TABLET: 5; 325 TABLET ORAL at 22:08

## 2021-12-21 RX ADMIN — GUAIFENESIN 1200 MG: 600 TABLET, EXTENDED RELEASE ORAL at 22:09

## 2021-12-21 RX ADMIN — FENTANYL CITRATE 25 MCG: 50 INJECTION, SOLUTION INTRAMUSCULAR; INTRAVENOUS at 11:52

## 2021-12-21 RX ADMIN — METOPROLOL TARTRATE 25 MG: 25 TABLET, FILM COATED ORAL at 22:09

## 2021-12-21 RX ADMIN — PROPOFOL 100 MCG/KG/MIN: 10 INJECTION, EMULSION INTRAVENOUS at 11:33

## 2021-12-21 RX ADMIN — PROPOFOL 50 MG: 10 INJECTION, EMULSION INTRAVENOUS at 11:32

## 2021-12-21 RX ADMIN — SODIUM CHLORIDE, POTASSIUM CHLORIDE, SODIUM LACTATE AND CALCIUM CHLORIDE: 600; 310; 30; 20 INJECTION, SOLUTION INTRAVENOUS at 11:27

## 2021-12-21 RX ADMIN — PROPOFOL 50 MG: 10 INJECTION, EMULSION INTRAVENOUS at 11:44

## 2021-12-21 RX ADMIN — INSULIN GLARGINE 60 UNITS: 100 INJECTION, SOLUTION SUBCUTANEOUS at 22:01

## 2021-12-21 RX ADMIN — HYDROMORPHONE HYDROCHLORIDE 0.5 MG: 2 INJECTION, SOLUTION INTRAMUSCULAR; INTRAVENOUS; SUBCUTANEOUS at 16:56

## 2021-12-21 RX ADMIN — ATORVASTATIN CALCIUM 40 MG: 20 TABLET, FILM COATED ORAL at 22:08

## 2021-12-21 ASSESSMENT — PULMONARY FUNCTION TESTS
PIF_VALUE: 0
PIF_VALUE: 1
PIF_VALUE: 0
PIF_VALUE: 1
PIF_VALUE: 0
PIF_VALUE: 1
PIF_VALUE: 0
PIF_VALUE: 0
PIF_VALUE: 1
PIF_VALUE: 0

## 2021-12-21 ASSESSMENT — PAIN DESCRIPTION - ONSET: ONSET: ON-GOING

## 2021-12-21 ASSESSMENT — PAIN SCALES - GENERAL
PAINLEVEL_OUTOF10: 6
PAINLEVEL_OUTOF10: 0
PAINLEVEL_OUTOF10: 0
PAINLEVEL_OUTOF10: 7

## 2021-12-21 ASSESSMENT — PAIN DESCRIPTION - ORIENTATION: ORIENTATION: RIGHT

## 2021-12-21 ASSESSMENT — PAIN DESCRIPTION - FREQUENCY: FREQUENCY: CONTINUOUS

## 2021-12-21 ASSESSMENT — PAIN DESCRIPTION - DESCRIPTORS
DESCRIPTORS: STABBING
DESCRIPTORS: ACHING

## 2021-12-21 ASSESSMENT — PAIN DESCRIPTION - PAIN TYPE: TYPE: SURGICAL PAIN

## 2021-12-21 ASSESSMENT — PAIN - FUNCTIONAL ASSESSMENT: PAIN_FUNCTIONAL_ASSESSMENT: 0-10

## 2021-12-21 ASSESSMENT — PAIN DESCRIPTION - PROGRESSION: CLINICAL_PROGRESSION: GRADUALLY WORSENING

## 2021-12-21 ASSESSMENT — PAIN DESCRIPTION - LOCATION: LOCATION: FOOT

## 2021-12-21 ASSESSMENT — LIFESTYLE VARIABLES: SMOKING_STATUS: 1

## 2021-12-21 NOTE — ANESTHESIA POSTPROCEDURE EVALUATION
Department of Anesthesiology  Postprocedure Note    Patient: Coni Hanson  MRN: [id number]  YOB: 1975  Date of evaluation: 12/21/2021  Time:  12:20 PM     Procedure Summary     Date: 12/21/21 Room / Location: [address]    Anesthesia Start: 1126 Anesthesia Stop: 2569    Procedure: EXCISION OF HEAD LEFT 2ND METATARSAL (Left ) Diagnosis: (CHRONIC NON HEALING ULCER OF LEFT FOOT)    Surgeons: Homero Gilman DPM Responsible Provider:     Anesthesia Type: MAC ASA Status: 3          Anesthesia Type: MAC    Marcus Phase I:      Marcus Phase II:      Last vitals: Reviewed and per EMR flowsheets.        Anesthesia Post Evaluation    Patient location during evaluation: bedside  Patient participation: complete - patient participated  Level of consciousness: awake and alert  Pain score: 0  Airway patency: patent  Nausea & Vomiting: no vomiting and no nausea  Complications: no  Cardiovascular status: hemodynamically stable  Respiratory status: room air  Hydration status: stable

## 2021-12-21 NOTE — CONSULTS
Name:  Jovany Valdes /Age/Sex: 1975  (55 y.o. male)   MRN & CSN:  [id number] & [id number] Admission Date/Time: 2021  7:39 AM   Location:  OR/NONE PCP: Alen Ruiz MD       Hospital Day: 1          Referring physician:  Cassia Pardo DPM         Reason for consultation:  cp        Thanks for referral.    Information source: patient    CC;  cp      HPI:   Thank you for involving me in taking  care of Jovany Valdes who  is a 55 y. 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 Past surgical history:   has a past surgical history that includes Cardiac catheterization (, ); Tonsillectomy and adenoidectomy (); Upper gastrointestinal endoscopy (2011); Colonoscopy (2011); Nose surgery (); skin biopsy (N/A, [id number]); other surgical history (Right, 2015); Toe amputation; Abdomen surgery; other surgical history (2017); pr deep incis foot bone infectn (Left, 2018); Upper gastrointestinal endoscopy (N/A, 2019); Toe amputation (Right, 3/23/2019);  Toe amputation (Right, 8/6/2019); Scrotal surgery (N/A, 5/15/2021); and Scrotal surgery (N/A, 5/16/2021). Social History:   reports that he has been smoking cigarettes. He has smoked for the past 20.00 years. He has never used smokeless tobacco. He reports current alcohol use. He reports current drug use. Frequency: 7.00 times per week. Drug: Marijuana Frances Ramesh). Family history:  family history includes ADHD in his daughter; Bleeding Prob in his mother; Cancer in his mother; Diabetes in his father and sister; Heart Disease in his father; High Blood Pressure in his father, sister, and another family member; Kidney Disease in his father; Mental Illness in his sister and another family member; Obesity in his father and sister; Other in his son; Stroke in his mother.     Allergies   Allergen Reactions    Adhesive Tape Rash    Doxycycline Nausea And Vomiting    Reglan [Metoclopramide] Anxiety       lactated ringers infusion, Continuous  amLODIPine (NORVASC) tablet 10 mg, Daily  ondansetron (ZOFRAN) tablet 4 mg, Q8H PRN  aspirin EC tablet 81 mg, Daily  sertraline (ZOLOFT) tablet 100 mg, Daily  atorvastatin (LIPITOR) tablet 40 mg, Nightly  metoprolol tartrate (LOPRESSOR) tablet 25 mg, BID  chlorthalidone (HYGROTON) tablet 25 mg, Daily  insulin glargine (LANTUS) injection vial 60 Units, Nightly  furosemide (LASIX) tablet 40 mg, Daily  clopidogrel (PLAVIX) tablet 75 mg, Daily  insulin lispro (HUMALOG) injection vial 20 Units, TID WC  glucose (GLUTOSE) 40 % oral gel 15 g, PRN  dextrose 50 % IV solution, PRN  glucagon (rDNA) injection 1 mg, PRN  dextrose 5 % solution, PRN  ondansetron (ZOFRAN) injection 4 mg, Q6H PRN  enoxaparin (LOVENOX) injection 40 mg, Daily  nitroGLYCERIN (NITROSTAT) SL tablet 0.4 mg, Q5 Min PRN  promethazine (PHENERGAN) tablet 12.5 mg, Q6H PRN   Or  ondansetron (ZOFRAN) injection 4 mg, Q6H PRN  potassium chloride 10 mEq/100 mL IVPB (Peripheral Line), PRN  magnesium sulfate 2000 mg in 50 mL IVPB premix, PRN  insulin lispro (HUMALOG) injection vial 0-12 Units, TID WC  insulin lispro (HUMALOG) injection vial 0-6 Units, Nightly  zolpidem (AMBIEN) tablet 5 mg, Nightly PRN  oxyCODONE-acetaminophen (PERCOCET) 5-325 MG per tablet 1 tablet, Q6H PRN  guaiFENesin-dextromethorphan (ROBITUSSIN DM) 100-10 MG/5ML syrup 10 mL, Q6H PRN  benzonatate (TESSALON) capsule 200 mg, TID PRN  guaiFENesin (MUCINEX) extended release tablet 1,200 mg, BID  calcium carbonate (TUMS) chewable tablet 750 mg, TID PRN  loperamide (IMODIUM) capsule 2 mg, 4x Daily PRN  lactulose (CHRONULAC) 10 GM/15ML solution 20 g, BID PRN      Current Facility-Administered Medications   Medication Dose Route Frequency Provider Last Rate Last Admin    lactated ringers infusion   IntraVENous Continuous Sinan Vargas  mL/hr at 12/21/21 1630 New Bag at 12/21/21 1630    amLODIPine (NORVASC) tablet 10 mg  10 mg Oral Daily José Luis Izquierdo MD        ondansetron (ZOFRAN) tablet 4 mg  4 mg Oral Q8H PRN José Luis Izquierdo MD        aspirin EC tablet 81 mg  81 mg Oral Daily José Luis Izquierdo MD        sertraline (ZOLOFT) tablet 100 mg  100 mg Oral Daily José Luis Izquierdo MD        atorvastatin (LIPITOR) tablet 40 mg  40 mg Oral Nightly José Luis Izquierdo MD        metoprolol tartrate (LOPRESSOR) tablet 25 mg  25 mg Oral BID José Luis Izquierdo MD        chlorthalidone (HYGROTON) tablet 25 mg  25 mg Oral Daily José Luis Izquierdo MD        insulin glargine (LANTUS) injection vial 60 Units  60 Units SubCUTAneous Nightly José Luis Izquierdo MD        furosemide (LASIX) tablet 40 mg  40 mg Oral Daily José Luis Izquierdo MD        clopidogrel (PLAVIX) tablet 75 mg  75 mg Oral Daily José Luis Izquierdo MD        insulin lispro (HUMALOG) injection vial 20 Units  20 Units SubCUTAneous TID  José Luis Izquierdo MD        glucose (GLUTOSE) 40 % oral gel 15 g  15 g Oral PRN Shawn Ram MD        dextrose 50 % IV solution  12.5 g IntraVENous PRN Shawn Ram MD        glucagon (rDNA) injection 1 mg  1 mg IntraMUSCular PRN Joey Larose MD        dextrose 5 % solution  100 mL/hr IntraVENous PRN Joey Larose MD        ondansetron (ZOFRAN) injection 4 mg  4 mg IntraVENous Q6H PRN José Luis Izquierdo MD        enoxaparin (LOVENOX) injection 40 mg  40 mg SubCUTAneous Daily José Luis Izquierdo MD        nitroGLYCERIN (NITROSTAT) SL tablet 0.4 mg  0.4 mg SubLINGual Q5 Min PRN Joey Larose MD        promethazine (PHENERGAN) tablet 12.5 mg  12.5 mg Oral Q6H PRN José Luis Izquierdo MD        Or    ondansetron (ZOFRAN) injection 4 mg  4 mg IntraVENous Q6H PRN José Luis Izquierdo MD        potassium chloride 10 mEq/100 mL IVPB (Peripheral Line)  10 mEq IntraVENous PRN Joey Larose MD        magnesium sulfate 2000 mg in 50 mL IVPB premix  2,000 mg IntraVENous PRN Joey Larose MD        insulin lispro (HUMALOG) injection vial 0-12 Units  0-12 Units SubCUTAneous TID WC José Luis Izquierdo MD        insulin lispro (HUMALOG) injection vial 0-6 Units  0-6 Units SubCUTAneous Nightly José Luis Izquierdo MD        zolpidem (AMBIEN) tablet 5 mg  5 mg Oral Nightly PRN José Luis Izquierdo MD        oxyCODONE-acetaminophen (PERCOCET) 5-325 MG per tablet 1 tablet  1 tablet Oral Q6H PRN MD Karmen Cook MaiaiFENesin-dextromethorphan (ROBITUSSIN DM) 100-10 MG/5ML syrup 10 mL  10 mL Oral Q6H PRN José Luis Izquierdo MD        benzonatate (TESSALON) capsule 200 mg  200 mg Oral TID PRN José Luis Izquierdo MD        guaiFENesin (MUCINEX) extended release tablet 1,200 mg  1,200 mg Oral BID Joey Larose MD        calcium carbonate (TUMS) chewable tablet 750 mg  750 mg Oral TID PRN José Luis Izquierdo MD        loperamide (IMODIUM) capsule 2 mg  2 mg Oral 4x Daily PRN José Luis Izquierdo MD        lactulose (CHRONULAC) 10 GM/15ML solution 20 g  20 g Oral BID PRN Joey Larose MD         Review of Systems:  All 14 systems reviewed, all negative except for  cp    Physical Examination:    BP (!)

## 2021-12-21 NOTE — ANESTHESIA PRE PROCEDURE
Department of Anesthesiology  Preprocedure Note       Name:  Marylee Shines   Age:  55 y.o.  :  1975                                          MRN:  [id number]         Date:  2021      Surgeon: José Miguel Ram):  Memo Song DPM    Procedure: Procedure(s):  EXCISION OF HEAD LEFT 2ND METATARSAL    Medications prior to admission:   Prior to Admission medications    Medication Sig Start Date End Date Taking?  Authorizing Provider   apixaban (ELIQUIS) 2.5 MG TABS tablet Take 1 tablet by mouth 2 times daily 10/1/21   APPLE Sullivan - CNP   clopidogrel (PLAVIX) 75 MG tablet Take 1 tablet by mouth daily 10/2/21   APPLE Sullivan - CNP   furosemide (LASIX) 40 MG tablet Take 1 tablet by mouth daily 9/22/21 10/22/21  Paloma Mcmullen MD   insulin glargine (LANTUS) 100 UNIT/ML injection vial Inject 75 Units into the skin nightly    Historical Provider, MD   insulin aspart (NOVOLOG RELION) 100 UNIT/ML injection vial Inject 35 Units into the skin 3 times daily (before meals)    Historical Provider, MD   atorvastatin (LIPITOR) 40 MG tablet Take 1 tablet by mouth nightly 6/15/21   Priya Livingston MD   metoprolol tartrate (LOPRESSOR) 25 MG tablet Take 1 tablet by mouth 2 times daily 6/15/21   Priya Livingston MD   amLODIPine (NORVASC) 10 MG tablet Take 1 tablet by mouth daily 6/15/21   Priya Livingston MD   chlorthalidone (HYGROTON) 25 MG tablet Take 1 tablet by mouth daily 6/15/21   Priya Livingston MD   sertraline (ZOLOFT) 50 MG tablet Take 2 tablets by mouth daily 21   Priay Livingston MD   aspirin 81 MG EC tablet Take 1 tablet by mouth daily 21   Fredy Kirby MD   linagliptin (TRADJENTA) 5 MG tablet Take 1 tablet by mouth daily 3/29/21   Priya Livingston MD   ondansetron (ZOFRAN) 4 MG tablet Take 1 tablet by mouth every 8 hours as needed for Nausea or Vomiting 18   Adrianna Albarran MD   Lancets MISC 1 each by Does not apply route daily 18   Adrianna Albarran MD   glucose monitoring kit (FREESTYLE) monitoring kit 1 each by Does not apply route once for 1 dose. 6/20/13 6/20/13  Ruth Garcia MD       Current medications:    No current facility-administered medications for this visit. No current outpatient medications on file. Facility-Administered Medications Ordered in Other Visits   Medication Dose Route Frequency Provider Last Rate Last Admin    lactated ringers infusion   IntraVENous Continuous Dru Millan MD           Allergies: Allergies   Allergen Reactions    Adhesive Tape Rash    Doxycycline Nausea And Vomiting    Reglan [Metoclopramide] Anxiety       Problem List:    Patient Active Problem List   Diagnosis Code    Hiatal hernia K44.9    Diabetes mellitus type 2, improved controlled E11.65    Diabetic neuropathy ([facility]) E11.40    Panic attack F41.0    Anxiety associated with depression F41.8    Neck pain M54.2    DANIELA (obstructive sleep apnea) G47.33    Urinary frequency R35.0    Bilateral carpal tunnel syndrome- left worse than right G56.03    Hyperlipidemia with target LDL less than 100 E78.5    Essential hypertension I10    Bronchitis J40    Osteomyelitis ([facility]) M86.9    Abscess A93.42    Periumbilical hernia B20.8    Sepsis ([facility]) A41.9    Cellulitis of left foot L03. 116    Constipation K59.00    Intractable nausea and vomiting R11.2    Uncontrolled type 2 diabetes mellitus ([facility]) E11.65    Nausea and vomiting R11.2    Diabetic foot infection ([facility]) E11.628, L08.9    Acute renal failure (ARF) (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) E66.01, Z68.41    Necrotizing fasciitis ([address] M72.6    Syncope and collapse R55    Right groin wound S31.109A    Ulcer of right groin with fat layer exposed ([address] L98.492    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 type 2 diabetes mellitus, with muscle involvement without evidence of necrosis (HCC) E11.621, L97.425    Nephrotic syndrome N04.9    Generalized edema R60.1    Stage 3b chronic kidney disease (HCC) N18.32    Diabetic nephropathy associated with type 2 diabetes mellitus (HCC) E11.21    Hypoalbuminemia E88.09    Chronic kidney disease, stage IV (severe) (HCC) N18.4    FH: CAD (coronary artery disease) Z82.49    ASCVD (arteriosclerotic cardiovascular disease) I25.10    Other proteinuria R80.8       Past Medical History:        Diagnosis Date    Abscess 2010    scrotal    Acute renal failure (ARF) ([address] 08/04/2019    Anxiety associated with depression     Anxiety associated with depression     Back pain 07/02/2012    Chest pain 05/01/2013    Diabetic nephropathy ([address]     Diabetic neuropathy ([address] 12/22/2011    Diabetic ulcer of left midfoot associated with type 2 diabetes mellitus, with fat layer exposed ([address] 07/18/2017    Diverticulosis     C scope + Dr. Kwame Cohen DM (diabetes mellitus), type 2 ([address] 2002    DR. 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 Kwame Cohen Necrotizing fasciitis [facility])     Nose fracture 1988    Panic attacks     Pericarditis 2003    Hospitalized with s/p heart cath normal    Peripheral autonomic neuropathy due to diabetes mellitus ([address]     Axonal EMG- NCS, March 2011    Sebaceous cyst 09/01/2011    URI (upper respiratory infection) 02/27/2012    WD-Wound, surgical, infected, initial encounter 12/08/2017    Wrist fracture 1986       Past Surgical History:        Procedure Laterality Date    ABDOMEN SURGERY      CARDIAC CATHETERIZATION  2003, 2013    Normal (Dr. Carlos Alberto Lee)    COLONOSCOPY  6/2/2011    Pandiverticulosis, Nonbleeding internal hemorrhoids, Repeat colonoscopy at age 48- Dr. Pepe Potts    \"had reconstruction surgery on nose a year after the other nose surgery\"    OTHER SURGICAL HISTORY Right 05/22/2015    I & D right great toe with partial amputation    OTHER SURGICAL HISTORY  12/04/2017    inc and drainage of abcess    OH DEEP INCIS FOOT BONE INFECTN Left 9/22/2018    LEFT FOOT DEBRIDEMENT INCISION AND DRAINAGE TOP AND BOTTOM performed by Naveed Lopez DPM at [facility] N/A 5/15/2021    SCROTAL AND PERINEAL DEBRIDEMENT performed by Nicolasa Denson MD at [address] [place] [address] 5/16/2021    SCROTAL RE-DEBRIDEMENT  INCISION AND DRAINAGE performed by Nicolasa Denson MD at [facility] [id number]    sebaceous cyst removal times 4     TOE AMPUTATION      right great toe    TOE AMPUTATION Right 3/23/2019    TOE AMPUTATION RIGHT 5TH TOE performed by Naveed Lopez DPM at [facility] [address] Right 8/6/2019    TOE AMPUTATION RIGHT 4TH TOE performed by Naveed Lopez DPM at [facility] [address] ENDOSCOPY  06/2/2011    Mild gastritis with moderatley severe antritis, small hiatal hernia, Dr. Beather Apley 1/12/2019    EGD BIOPSY performed by Ludy Zuluaga MD at [facility] ENDOSCOPY       Social History:    Social History     Tobacco Use    Smoking status: Current Some Day Smoker     Years: 20.00     Types: Cigarettes    Smokeless tobacco: Never Used    Tobacco comment: Smokes when drinking/social   Substance Use Topics    Alcohol use: Yes     Comment: occ                                Ready to quit: Not Answered  Counseling given: Not Answered  Comment: Smokes when drinking/social      Vital Signs (Current):    There were no vitals filed for this visit.                                           BP Readings from Last 3 Encounters:   12/21/21 (!) 145/87   12/14/21 118/70   12/07/21 (!) 176/114       NPO Status:                                                                                 BMI:   Wt Readings from Last 3 Encounters:   12/20/21 260 lb (117.9 kg)   10/27/21 290 lb (131.5 kg)   10/06/21 288 lb (130.6 kg)     There is no height or weight on file to calculate BMI.    CBC:   Lab Results   Component Value Date    WBC 7.7 10/01/2021    RBC 3.76 10/01/2021    HGB 11.0 10/01/2021    HCT 33.2 10/01/2021    MCV 88.3 10/01/2021    RDW 12.5 10/01/2021     10/01/2021       CMP:   Lab Results   Component Value Date     10/18/2021    K 5.3 10/18/2021     10/18/2021    CO2 20 10/18/2021    BUN 61 10/18/2021    CREATININE 2.2 10/18/2021    GFRAA 39 10/18/2021    LABGLOM 32 10/18/2021    GLUCOSE 158 10/18/2021    PROT 6.0 09/17/2021    PROT 7.1 04/11/2012    CALCIUM 8.7 10/18/2021    BILITOT 0.2 09/17/2021    ALKPHOS 92 09/17/2021    AST 12 09/17/2021    ALT 11 09/17/2021       POC Tests:   Recent Labs     12/21/21  0857   POCGLU 238*       Coags:   Lab Results   Component Value Date    PROTIME 11.1 09/30/2021    PROTIME 12.5 12/13/2010    INR 0.86 09/30/2021    APTT 30.8 03/22/2019       HCG (If Applicable): No results found for: PREGTESTUR, PREGSERUM, HCG, HCGQUANT     ABGs:   Lab Results   Component Value Date    PO2ART 119 06/18/2013    TQH1PWA 47.0 06/18/2013    YUT0TBB 24.2 06/18/2013    BEART 2 06/18/2013        Type & Screen (If Applicable):  No results found for: LABABO, LABRH    Drug/Infectious Status (If Applicable):  Lab Results   Component Value Date    HEPCAB NON REACTIVE 03/28/2021       COVID-19 Screening (If Applicable):   Lab Results   Component Value Date    COVID19 NOT DETECTED 12/17/2021           Anesthesia Evaluation  Patient summary reviewed  Airway: Mallampati: II  TM distance: >3 FB   Neck ROM: full  Comment: beard  Mouth opening: > = 3 FB Dental:      Comment: Very poor dentition and periodontal disease. Multiple loose, chipped, and broken teeth. Pulmonary:normal exam    (+) sleep apnea:  asthma: current smoker                          ROS comment: thc daily    Cardiovascular:  Exercise tolerance: good (>4 METS),   (+) hypertension:, angina:, CAD:, CABG/stent:, hyperlipidemia      ECG reviewed      Echocardiogram reviewed               ROS comment: Left ventricular systolic function is normal.   Ejection fraction is visually estimated at 55-60%. Mild left ventricular hypertrophy. Mildly dilated right ventricle. RVSP of 20 mmHG   Sclerotic, but non-stenotic aortic valve. No evidence of any pericardial effusion. Neuro/Psych:   (+) psychiatric history:depression/anxiety              ROS comment: Neuropathy   etoh  GI/Hepatic/Renal:   (+) hiatal hernia, renal disease: CRI, morbid obesity          Endo/Other:    (+) DiabetesType II DM, poorly controlled, using insulin, blood dyscrasia: anticoagulation therapy and anemia:., electrolyte abnormalities, . Pt had no PAT visit       Abdominal:   (+) obese,           Vascular: Other Findings:               Anesthesia Plan      MAC     ASA 3       Induction: intravenous. MIPS: Postoperative opioids intended and Prophylactic antiemetics administered. Anesthetic plan and risks discussed with patient. Use of blood products discussed with patient whom. Plan discussed with attending.     Attending anesthesiologist reviewed and agrees with Preprocedure content              APPLE Melendez - CRNA   12/21/2021

## 2021-12-21 NOTE — H&P
HISTORY AND PHYSICAL    2021     Patient Information:    Patient: Roselyn Hager     Gender: male  : 1975   Age: 55 y.o. MRN: [id number]  Room : OR/NONE      Pt`s preferred phone number :[phone]  [facility]  Extended Emergency Contact Information  Primary Emergency Contact: [facility]  Address: [address] [place] [address]           Lali Berrios [address] [place] [address] Phone: [phone]  Mobile Phone: [phone]  Relation: Spouse  Secondary Emergency Contact: Alice Perez, [address] [place] [address] Phone: [phone]  Mobile Phone: [phone]  Relation: Parent        PCP:  Patricio Cyr MD (Tel: [phone] )    Chief complaint:  No chief complaint on file. Lab Results   Component Value Date    LABA1C 6.2 2021       Lab Results   Component Value Date    LVEF 41 2021       Body mass index is 37.31 kg/m². History of Present Illness:  Roselyn Hager is a 55 y.o. male with h/o groin necrotizing fascitis with  surgical repair  with past medical history of Abscess, Acute renal failure (ARF) ([address] Anxiety associated with depression,,CAD with stenting  Diabetic nephropathy , Diabetic neuropathy , Diabetic ulcer of left midfoot associated with type 2 diabetes mellitus, with fat layer exposed ([address] Diverticulosis, DM llitus), type 2 , Hyperlipidemia, Hypertension, Internal hemorrhoid,  Pericarditis, Peripheral autonomic neuropathy due to diabetes mellitus who had left foot debridement by podiatrist and is recovery room pt had some moderate 7/10 sharp left side chest pain radiated to left shoulder . Pain was associated with palpitation and lightheadedness , he denied any SOB, dizziness or SOB . Pt was seen earlier by his cardiologist Vidya Oconnor in recovery room and recommended for admission .   Pt is not taking his meds for more than a month and he was on Plavix , ASA and Eliqus .  History obtained from patient . REVIEW OF SYSTEMS:   Constitutional: Negative for fever,chills . ENT: Negative for rhinorrhea,  sore throat. Respiratory: Negative for cough, shortness of breath,wheezing  Cardiovascular: + for chest pain, - palpitations   Gastrointestinal: Negative for Abdominal pain, nausea, vomiting, diarrhea  Genitourinary: Negative for polyuria, dysuria   Hematologic/Lymphatic: Negative for bleeding tendency, easy bruising  Musculoskeletal: Negative for myalgias and arthralgias  Neurologic: Negative for confusion,dysarthria. + numbness   Skin: Negative for itching,rash  Psychiatric: Negative for depression,anxiety, agitation. Endocrine: Negative for polydipsia,polyuria,heat /cold intolerance. 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 Past Surgical History:   has a past surgical history that includes Cardiac catheterization (2003, 2013); Tonsillectomy and adenoidectomy (1988); Upper gastrointestinal endoscopy (06/2/2011); Colonoscopy (6/2/2011); Nose surgery (1993); skin biopsy (N/A, [id number]); other surgical history (Right, 05/22/2015); Toe amputation;  Abdomen surgery; other surgical history (12/04/2017); pr deep incis foot bone infectn (Left, 9/22/2018); Upper gastrointestinal endoscopy (N/A, 1/12/2019); Toe amputation (Right, 3/23/2019); Toe amputation (Right, 8/6/2019); Scrotal surgery (N/A, 5/15/2021); and Scrotal surgery (N/A, 5/16/2021). Medications:  No current facility-administered medications on file prior to encounter. Current Outpatient Medications on File Prior to Encounter   Medication Sig Dispense Refill    insulin aspart (NOVOLOG RELION) 100 UNIT/ML injection vial Inject 35 Units into the skin 3 times daily (before meals)      atorvastatin (LIPITOR) 40 MG tablet Take 1 tablet by mouth nightly 30 tablet 3    metoprolol tartrate (LOPRESSOR) 25 MG tablet Take 1 tablet by mouth 2 times daily 60 tablet 3    amLODIPine (NORVASC) 10 MG tablet Take 1 tablet by mouth daily 30 tablet 3    chlorthalidone (HYGROTON) 25 MG tablet Take 1 tablet by mouth daily 30 tablet 3    sertraline (ZOLOFT) 50 MG tablet Take 2 tablets by mouth daily 30 tablet 3    aspirin 81 MG EC tablet Take 1 tablet by mouth daily 30 tablet 3    linagliptin (TRADJENTA) 5 MG tablet Take 1 tablet by mouth daily 30 tablet 5    ondansetron (ZOFRAN) 4 MG tablet Take 1 tablet by mouth every 8 hours as needed for Nausea or Vomiting 20 tablet 0    apixaban (ELIQUIS) 2.5 MG TABS tablet Take 1 tablet by mouth 2 times daily 60 tablet 5    clopidogrel (PLAVIX) 75 MG tablet Take 1 tablet by mouth daily 30 tablet 3    furosemide (LASIX) 40 MG tablet Take 1 tablet by mouth daily 30 tablet 1    insulin glargine (LANTUS) 100 UNIT/ML injection vial Inject 75 Units into the skin nightly      Lancets MISC 1 each by Does not apply route daily 100 each 3    glucose monitoring kit (FREESTYLE) monitoring kit 1 each by Does not apply route once for 1 dose. 1 kit 0       Allergies:   Allergies   Allergen Reactions    Adhesive Tape Rash    Doxycycline Nausea And Vomiting    Reglan [Metoclopramide] Anxiety        Social History:   reports that he has been smoking cigarettes. He has smoked for the past 20.00 years. He has never used smokeless tobacco. He reports current alcohol use. He reports current drug use. Frequency: 7.00 times per week. Drug: Marijuana Liz Rivas). Family History:  family history includes ADHD in his daughter; Bleeding Prob in his mother; Cancer in his mother; Diabetes in his father and sister; Heart Disease in his father; High Blood Pressure in his father, sister, and another family member; Kidney Disease in his father; Mental Illness in his sister and another family member; Obesity in his father and sister; Other in his son; Stroke in his mother. ,     Immunization History   Administered Date(s) Administered    Influenza 10/15/2013    Influenza, Cristina Johansen, 6 mo and older, IM, PF (Flulaval, Fluarix) 09/19/2018    Pneumococcal Polysaccharide ([id number]) 05/02/2013       Physical Exam:  /86   Pulse 85   Temp 97.4 °F (36.3 °C) (Temporal)   Resp 15   Ht 5' 10\" (1.778 m)   Wt 260 lb (117.9 kg)   SpO2 99%   BMI 37.31 kg/m²     General appearance:  no distress . Well nourished  Eyes: Sclera clear, pupils equal  Cardiovascular: Regular rhythm, normal S1, S2. No edema in lower extremities  Respiratory: Clear to auscultation bilaterally, no wheeze, good inspiratory effort  Gastrointestinal: Abdomen soft, non-tender, not distended, normal bowel sounds  Musculoskeletal: No cyanosis in digits, neck supple , left foot wrapped post-op  Neurology: Cranial nerves grossly intact. Alert and oriented . No speech or motor deficits , distal neuropathy   Psychiatry: Appropriate affect.  Not agitated  Skin: Warm, dry, normal turgor, no rash    Labs:  CBC:   Lab Results   Component Value Date    WBC 7.7 10/01/2021    RBC 3.76 10/01/2021    HGB 11.0 10/01/2021    HCT 33.2 10/01/2021    MCV 88.3 10/01/2021    MCH 29.3 10/01/2021    MCHC 33.1 10/01/2021    RDW 12.5 10/01/2021     10/01/2021    MPV 10.3 10/01/2021     BMP:    Lab Results   Component Value Date     10/18/2021    K 5.3 10/18/2021     10/18/2021    CO2 20 10/18/2021    BUN 61 10/18/2021    CREATININE 2.2 10/18/2021    CALCIUM 8.7 10/18/2021    GFRAA 39 10/18/2021    LABGLOM 32 10/18/2021    GLUCOSE 158 10/18/2021       Chest Xray:   EKG:    I visualized CXR images and EKG strips      Patient Active Problem List   Diagnosis Code    Hiatal hernia K44.9    Diabetes mellitus type 2, improved controlled E11.65    Diabetic neuropathy ([facility]) E11.40    Panic attack F41.0    Anxiety associated with depression F41.8    Neck pain M54.2    DANIELA (obstructive sleep apnea) G47.33    Urinary frequency R35.0    Bilateral carpal tunnel syndrome- left worse than right G56.03    Hyperlipidemia with target LDL less than 100 E78.5    Essential hypertension I10    Bronchitis J40    Osteomyelitis ([address] M86.9    Abscess I33.63    Periumbilical hernia H53.7    Sepsis ([address] A41.9    Cellulitis of left foot L03. 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    Recurrent major depressive disorder, in full remission ([address] F33.42    Generalized anxiety disorder F41.1    Morbid obesity with body mass index (BMI) of 40.0 to 44.9 in adult ([facility]) E66.01, Z68.41    Necrotizing fasciitis ([facility]) M72.6    Syncope and collapse R55    Right groin wound S31.109A    Ulcer of right groin with fat layer exposed ([address] L98.492    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 type 2 diabetes mellitus, with muscle involvement without evidence of necrosis ([facility]) E11.621, L97.425    Nephrotic syndrome N04.9    Generalized edema R60.1    Stage 3b chronic kidney disease [facility]) N18.32    Diabetic nephropathy associated with type 2 diabetes mellitus ([facility] E11.21    Hypoalbuminemia E88.09    Chronic kidney disease, stage IV (severe) (HCC) N18.4    FH: CAD (coronary artery disease) Z82.49    ASCVD (arteriosclerotic cardiovascular disease) I25.10    Other proteinuria R80.8    Chest pain R07.9         Active Hospital Problems    Diagnosis     Chest pain [R07.9]      CAD with stenting   DM II  Neuropathy   HTN  HLP  Obesity   Non compliance         Assessment/Plan:   Admitted on chest pain protocol  Tele , cardiac markers, cardiology consult   Glucose control, insulin coverage   IVF  Pin control   Home meds , reviewed and resumed as appropriate   Symptoms releif/Pain control  DVT proph           Elfego Virk MD    12/21/2021 2:48 PM

## 2021-12-21 NOTE — PROGRESS NOTES
1220-Patient arrived in PACU, monitors applied, VSS, [email or web address] mask. Patient very drowsy, but does respond to verbal stimuli. Surgical dressing dry and intact. 1230-Patient complains of chest pressure and heaviness. States he feels as if someone is sitting on his chest. Call placed to Dr. Karen Hoover to see patient. New order for stat EKG. 1239-Dr. Karen Hoover here to see patient  1245-EKG done  1335-Dr. Ibarra at bedside to see patient  1400-consult to hospitalist  1430-Chest pain has resolved, patient awaiting admit overnight. Phase 1 care complete  1656-Patient complains of pain, medicated  1852-Patient's wife brought patient dinner, took patient's blood sugar 225 and gave his mealtime insulin.   1948-Attempted to call report on patient to , was told nurse not available would call back  2015-Report given to [facility]

## 2021-12-21 NOTE — OP NOTE
Operative Note      Patient: Conner Chun  YOB: 1975  MRN: [id number]    Date of Procedure: 12/21/2021    Pre-Op Diagnosis:   -Full-thickness ulceration down to level of subcutaneous tissue left foot  -Type 2 diabetes mellitus with peripheral neuropathy  -Metatarsalgia left foot    Post-Op Diagnosis: Same       Procedure:  Second metatarsal head excision left foot  Excisional debridement of wound down to subcutaneous tissue left foot    Surgeon(s):  Harinder Roblero DPM    Assistant:   * No surgical staff found *    Anesthesia: Monitor Anesthesia Care    Estimated Blood Loss (mL): Minimal    Complications: None    Specimens:   * No specimens in log *    Implants:  * No implants in log *      Drains: * No LDAs found *    Findings: Second metatarsal head was successfully resected. Pressure-point is no longer evident along the lateral aspect of the foot and this was confirmed clinically with palpation through the incision site. No signs of infection noted in the incision site. Bone quality was adequate. Detailed Description of Procedure: This is a patient who I saw for the first time in the wound care center about a month ago. Had a chronic subsecond metatarsal head wound of the left foot. Patient says that he has had the wound for over a year and a half and has tried multiple conservative treatment options for it. He says that he is a type II diabetic with peripheral neuropathy but said that his last hemoglobin A1c was 6.5%. On clinical examination he has good pedal pulses. No signs of infection are noted to the wound site. Patient does not smoke cigarettes. Patient elects to proceed with second metatarsal head resection of the left foot today. All risks, benefits, and complications of the procedure were explained to the patient with his full understanding. No guarantees were given or implied. Patient was seen in the preoperative holding area and the left lower extremity was signed.   The consent was again gone over and signed with the patient. Patient was taken from the preoperative holding area to the operating room and placed on the operating room table in the supine position. Patient received 2 g of IV Ancef preoperatively. After MAC anesthesia by the anesthesia service a well-padded pneumatic ankle tourniquet was applied to the left lower extremity. The left lower extremity was then marked, prepped, and draped in usual sterile aseptic fashion. Timeout was performed and the appropriate procedure and surgical site was confirmed. OR staff agreed. At this time attention was directed to the second metatarsal phalangeal joint of the left foot where a linear incision was made just medial to the long extensor tendon of the second toe. This was made with a #15 blade and blunt dissection was carried down through fascial and subcutaneous tissue down to the level of joint capsule. Once the vital neurovascular tendon structures were retracted out of the way and bleeders were cauterized as needed with Bovie, linear periosteal incision was made over the second metatarsal phalangeal joints. All soft tissue attachments were freed up from the head of the second metatarsal.  Sagittal saw was then utilized to resect the head of the second metatarsal with appropriate angulation. The metatarsal head was then removed from the incision site and passed to the back table. Palpation was then performed through the wound to the plantar aspect of the foot where the wound was present. No longer any pressure noted to the wound. No infection, abscess, or crepitus was noted within the surgical site. Bleeding was noted to be adequate. The incision was then flushed with copious amounts of sterile saline. Skin edges were reapproximated with 4-0 nylon in an over over fashion.     Attention was then directed to the plantar aspect of the second metatarsal head of the left foot where chronic wound noted to be present. Predebridement measurements noted to be 0.8 cm x 0.4 cm x 0.3 cm. A sterile #15 blade was utilized to debride the hyperkeratotic tissue in the subcutaneous tissue down to the level of healthy subcutaneous tissue without incident. Minimal bleeding was noted and controlled with pressure. This noted to improve the status of the wound by removing the bioburden. Post debridement measurements noted to be 1 cm x 0.5 cm x 0.3 cm. No deep probing to bone was noted. Dry dressing consisting of Betadine soaked 4 x 4's, dry 4 x 4's, Kerlix, Ace wrap was applied to the left foot. Pneumatic ankle tourniquet was removed and was not used during the procedure. Patient was taken from the operating room to the PACU with vital signs stable and neurovascular status intact. He is to be weightbearing as tolerated to his left foot and forefoot offloading shoe. He is to keep the dressing clean, dry, and intact until I see him in the office for his first postoperative visit in 1 week. I did give him a prescription of Bactrim for 1 week for coverage of any infection given the history of chronic wound. [place] 5/325 dispensed to the patient for postoperative pain. If patient has any signs of infection or DVT he is to call the office or go to the nearest emergency room for further evaluation. He is to contact me with any questions he may have before seen in the office. Please contact any questions thank you.     Dhara Salcedo DPM    [facility] [address] [place] [address] and Ankle Surgery        Electronically signed by Dhara Salcedo DPM on 12/21/2021 at 12:16 PM

## 2021-12-21 NOTE — H&P
EC tablet, Take 1 tablet by mouth daily  linagliptin (TRADJENTA) 5 MG tablet, Take 1 tablet by mouth daily  ondansetron (ZOFRAN) 4 MG tablet, Take 1 tablet by mouth every 8 hours as needed for Nausea or Vomiting  apixaban (ELIQUIS) 2.5 MG TABS tablet, Take 1 tablet by mouth 2 times daily  clopidogrel (PLAVIX) 75 MG tablet, Take 1 tablet by mouth daily  furosemide (LASIX) 40 MG tablet, Take 1 tablet by mouth daily  insulin glargine (LANTUS) 100 UNIT/ML injection vial, Inject 75 Units into the skin nightly  Lancets MISC, 1 each by Does not apply route daily  glucose monitoring kit (FREESTYLE) monitoring kit, 1 each by Does not apply route once for 1 dose. Allergies:  Patient has no known allergies. Social History:   Smokes marijuana  Family History:       Problem Relation Age of Onset   [facility] Cancer Mother         ?site   [facility] Stroke Mother     Bleeding Prob Mother     Diabetes Father     Heart Disease Father     High Blood Pressure Father     Obesity Father     Kidney Disease Father     Diabetes Sister     High Blood Pressure Sister     Mental Illness Sister     Obesity Sister     High Blood Pressure Other     Mental Illness Other         bipolar    Other Son         cyst in ear canal    ADHD Daughter      REVIEW OF SYSTEMS:    Constitutional: Denies cough, fever, shortness of breath  Cardiovascular: Denies chest pain, heart palpitations, difficulty breathing  Musculoskeletal: Denies pain weakness or fatigue    PHYSICAL EXAM:    VITALS:  BP (!) 145/87   Pulse 96   Temp 97.7 °F (36.5 °C) (Temporal)   Resp 18   Ht 5' 10\" (1.778 m)   Wt 260 lb (117.9 kg)   SpO2 97%   BMI 37.31 kg/m²     Vascular: Pedal pulses are palpable to the left foot. Capillary refill time is brisk. Pedal hair growth is noted  Neurologic gross and epicritic sensation are diminished  Dermatologic: Full-thickness ulceration down to subcutaneous tissue subsecond tarsi left foot no purulent drainage or malodor.   No surrounding erythema. Musculoskeletal: No pain on palpation to affected area of the left foot. Assessment:  -Chronic full-thickness ulceration subcutaneous tissue left foot  -Type 2 diabetes mellitus with peripheral neuropathy        Plan:  Surgery was discussed at length today with the patient. This included the potential risks, conditions as well as the postoperative course. The risks include but are not limited to: Pain, infection, swelling, worsening or no better. We rediscussed the risk for skin complications or wound complication/nerve related complications and/or bone related complications, hardware complications, bone healing complications, failure of procedure, recurrence, future surgery required, death, anesthesia risks. Blood clots as well as other numerous risks were discussed at length today. Patient understands all this and is agreeable. The postoperative course was explained in detail today as well as the weightbearing status, the need to keep the bandage clean, dry, and intact, and other post procedural specific care was all discussed at length. Postoperative instruction was given and reviewed with the patient. Surgical consents were reviewed today and signed appropriately. We discussed anticoagulation therapy and DVT prophylaxis. This was discussed at length. Proper prescription to be given to patient based on our discussion today. We discussed the signs and symptoms of DVT and PE at length.    -We will plan for second metatarsal excision of the left foot with excisional debridement of all nonviable tissue and bone.  -Patient has had nothing to eat or drink since midnight in anticipation for surgery today.  -All risks, benefits, and complications of the procedure were explained to the patient and wife in detail today. No guarantees were given or implied.  -Discussed the patient is a high risk for developing infection given history of diabetes and chronic nonhealing wounds.   He understands and wishes to proceed. -To be discharged on oral antibiotics and forefoot offloading shoe.     Harinder Roblero DPM    [facility] [address] [place] [address] and Ankle Surgery

## 2021-12-22 ENCOUNTER — APPOINTMENT (OUTPATIENT)
Dept: NUCLEAR MEDICINE | Age: 46
End: 2021-12-22
Attending: STUDENT IN AN ORGANIZED HEALTH CARE EDUCATION/TRAINING PROGRAM
Payer: MEDICARE

## 2021-12-22 VITALS
DIASTOLIC BLOOD PRESSURE: 79 MMHG | SYSTOLIC BLOOD PRESSURE: 134 MMHG | TEMPERATURE: 97.8 F | HEIGHT: 70 IN | WEIGHT: 297.4 LBS | BODY MASS INDEX: 42.58 KG/M2 | RESPIRATION RATE: 16 BRPM | OXYGEN SATURATION: 96 % | HEART RATE: 72 BPM

## 2021-12-22 LAB
GLUCOSE BLD-MCNC: 191 MG/DL (ref 70–99)
GLUCOSE BLD-MCNC: 229 MG/DL (ref 70–99)
LV EF: 60 %
LVEF MODALITY: NORMAL

## 2021-12-22 PROCEDURE — G0378 HOSPITAL OBSERVATION PER HR: HCPCS

## 2021-12-22 PROCEDURE — 96374 THER/PROPH/DIAG INJ IV PUSH: CPT

## 2021-12-22 PROCEDURE — 93017 CV STRESS TEST TRACING ONLY: CPT

## 2021-12-22 PROCEDURE — 78452 HT MUSCLE IMAGE SPECT MULT: CPT

## 2021-12-22 PROCEDURE — A9500 TC99M SESTAMIBI: HCPCS | Performed by: INTERNAL MEDICINE

## 2021-12-22 PROCEDURE — 3430000000 HC RX DIAGNOSTIC RADIOPHARMACEUTICAL: Performed by: INTERNAL MEDICINE

## 2021-12-22 PROCEDURE — 6360000002 HC RX W HCPCS: Performed by: FAMILY MEDICINE

## 2021-12-22 PROCEDURE — 6360000002 HC RX W HCPCS: Performed by: INTERNAL MEDICINE

## 2021-12-22 PROCEDURE — 6370000000 HC RX 637 (ALT 250 FOR IP): Performed by: FAMILY MEDICINE

## 2021-12-22 PROCEDURE — 82962 GLUCOSE BLOOD TEST: CPT

## 2021-12-22 PROCEDURE — 96372 THER/PROPH/DIAG INJ SC/IM: CPT

## 2021-12-22 RX ORDER — AMINOPHYLLINE DIHYDRATE 25 MG/ML
75 INJECTION, SOLUTION INTRAVENOUS ONCE
Status: COMPLETED | OUTPATIENT
Start: 2021-12-22 | End: 2021-12-22

## 2021-12-22 RX ADMIN — KIT FOR THE PREPARATION OF TECHNETIUM TC99M SESTAMIBI 30 MILLICURIE: 1 INJECTION, POWDER, LYOPHILIZED, FOR SOLUTION PARENTERAL at 12:09

## 2021-12-22 RX ADMIN — AMINOPHYLLINE 75 MG: 25 INJECTION, SOLUTION INTRAVENOUS at 10:41

## 2021-12-22 RX ADMIN — METOPROLOL TARTRATE 25 MG: 25 TABLET, FILM COATED ORAL at 08:25

## 2021-12-22 RX ADMIN — ASPIRIN 81 MG: 81 TABLET, COATED ORAL at 08:25

## 2021-12-22 RX ADMIN — OXYCODONE AND ACETAMINOPHEN 1 TABLET: 5; 325 TABLET ORAL at 08:26

## 2021-12-22 RX ADMIN — INSULIN LISPRO 20 UNITS: 100 INJECTION, SOLUTION INTRAVENOUS; SUBCUTANEOUS at 08:27

## 2021-12-22 RX ADMIN — AMLODIPINE BESYLATE 10 MG: 5 TABLET ORAL at 08:25

## 2021-12-22 RX ADMIN — KIT FOR THE PREPARATION OF TECHNETIUM TC99M SESTAMIBI 10 MILLICURIE: 1 INJECTION, POWDER, LYOPHILIZED, FOR SOLUTION PARENTERAL at 12:09

## 2021-12-22 RX ADMIN — INSULIN LISPRO 20 UNITS: 100 INJECTION, SOLUTION INTRAVENOUS; SUBCUTANEOUS at 12:41

## 2021-12-22 RX ADMIN — CLOPIDOGREL BISULFATE 75 MG: 75 TABLET ORAL at 08:26

## 2021-12-22 RX ADMIN — FUROSEMIDE 40 MG: 20 TABLET ORAL at 08:25

## 2021-12-22 RX ADMIN — CHLORTHALIDONE 25 MG: 25 TABLET ORAL at 08:25

## 2021-12-22 RX ADMIN — REGADENOSON 0.4 MG: 0.08 INJECTION, SOLUTION INTRAVENOUS at 10:38

## 2021-12-22 RX ADMIN — ENOXAPARIN SODIUM 40 MG: 100 INJECTION SUBCUTANEOUS at 08:26

## 2021-12-22 RX ADMIN — ONDANSETRON 4 MG: 2 INJECTION INTRAMUSCULAR; INTRAVENOUS at 08:26

## 2021-12-22 RX ADMIN — GUAIFENESIN 1200 MG: 600 TABLET, EXTENDED RELEASE ORAL at 08:25

## 2021-12-22 RX ADMIN — SERTRALINE HYDROCHLORIDE 100 MG: 100 TABLET ORAL at 08:25

## 2021-12-22 ASSESSMENT — PAIN DESCRIPTION - PAIN TYPE: TYPE: SURGICAL PAIN

## 2021-12-22 ASSESSMENT — PAIN SCALES - GENERAL: PAINLEVEL_OUTOF10: 6

## 2021-12-22 ASSESSMENT — PAIN DESCRIPTION - LOCATION: LOCATION: FOOT

## 2021-12-22 ASSESSMENT — PAIN - FUNCTIONAL ASSESSMENT: PAIN_FUNCTIONAL_ASSESSMENT: ACTIVITIES ARE NOT PREVENTED

## 2021-12-22 ASSESSMENT — PAIN DESCRIPTION - ORIENTATION: ORIENTATION: RIGHT

## 2021-12-22 ASSESSMENT — PAIN DESCRIPTION - DESCRIPTORS: DESCRIPTORS: ACHING

## 2021-12-22 NOTE — DISCHARGE SUMMARY
Patient: Lucero Haynes MD      Gender: male  : 1975   Age: 55 y.o. MRN: [id number]    Admitting Physician: Latoya Tapia DPM  Discharge Physician: Beba Barrientos MD     Code Status: Full Code     Admit Date: 2021   Discharge Date: 21      Disposition:  Home       Condition at Discharge:  stable . Follow-up appointments:  f/u one week with PCP , and with consultants as recommended . Outpatient to do list: f/u     Pt`s preferred phone number :[phone]  [facility]  Extended Emergency Contact Information  Primary Emergency Contact: [facility]  Address: [address] [place] [address] Phone: [phone]  Mobile Phone: [phone]  Relation: Spouse  Secondary Emergency Contact: Mamie Bal, [facility]  Home Phone: [phone]  Mobile Phone: [phone]  Relation: Parent      Discharge Diagnoses: Active Hospital Problems    Diagnosis     Chest pain [R07.9]    H/O CAD with stenting   DM II  Neuropathy   HTN  HLP  Obesity   Non compliance        History of Present Illness:  Yovanny Levine is a 55 y. o. male with h/o groin necrotizing fascitis with  surgical repair  with past medical history of Abscess, Acute renal failure (ARF) ([address] Anxiety associated with depression,,CAD with stenting  Diabetic nephropathy , Diabetic neuropathy , Diabetic ulcer of left midfoot associated with type 2 diabetes mellitus, with fat layer exposed ([address] Diverticulosis, DM llitus), type 2 , Hyperlipidemia, Hypertension, Internal hemorrhoid,  Pericarditis, Peripheral autonomic neuropathy due to diabetes mellitus who had left foot debridement by podiatrist and is recovery room pt had some moderate 7/10 sharp left side chest pain radiated to left shoulder . Pain was associated with palpitation and lightheadedness , he denied any SOB, dizziness or SOB .      Pt was seen earlier by his cardiologist Sherri Alves in recovery room and recommended for admission . Pt is not taking his meds for more than a month and he was on Plavix , ASA and Eliqus . History obtained from patient .             Hospital Course:   NORMAL CARDIOLITE. Jaymie Dutta so discharged after cleared by cardiologist   Admitted on chest pain protocol  Tele , cardiac markers, cardiology consult   Glucose control, insulin coverage   IVF  Pin control   Home meds , reviewed and resumed as appropriate   Symptoms releif/Pain control  DVT proph     Consults. IP CONSULT TO HOSPITALIST  IP CONSULT TO CARDIOLOGY        Discharge Medications:   Current Discharge Medication List      START taking these medications    Details   sulfamethoxazole-trimethoprim (BACTRIM DS;SEPTRA DS) 800-160 MG per tablet Take 1 tablet by mouth 2 times daily for 7 days  Qty: 14 tablet, Refills: 0      HYDROcodone-acetaminophen (NORCO) 5-325 MG per tablet Take 1 tablet by mouth every 6 hours as needed for Pain for up to 5 days. Intended supply: 5 days.  Take lowest dose possible to manage pain  Qty: 20 tablet, Refills: 0    Comments: Reduce doses taken as pain becomes manageable  Associated Diagnoses: Diabetic ulcer of left midfoot associated with type 2 diabetes mellitus, with muscle involvement without evidence of necrosis [facility])           Current Discharge Medication List        Current Discharge Medication List      CONTINUE these medications which have NOT CHANGED    Details   insulin aspart (NOVOLOG RELION) 100 UNIT/ML injection vial Inject 35 Units into the skin 3 times daily (before meals)      atorvastatin (LIPITOR) 40 MG tablet Take 1 tablet by mouth nightly  Qty: 30 tablet, Refills: 3      metoprolol tartrate (LOPRESSOR) 25 MG tablet Take 1 tablet by mouth 2 times daily  Qty: 60 tablet, Refills: 3      amLODIPine (NORVASC) 10 MG tablet Take 1 tablet by mouth daily  Qty: 30 tablet, Refills: 3      chlorthalidone (HYGROTON) 25 MG tablet Take 1 tablet by mouth daily  Qty: 30 tablet, Refills: 3 sertraline (ZOLOFT) 50 MG tablet Take 2 tablets by mouth daily  Qty: 30 tablet, Refills: 3      aspirin 81 MG EC tablet Take 1 tablet by mouth daily  Qty: 30 tablet, Refills: 3      linagliptin (TRADJENTA) 5 MG tablet Take 1 tablet by mouth daily  Qty: 30 tablet, Refills: 5      ondansetron (ZOFRAN) 4 MG tablet Take 1 tablet by mouth every 8 hours as needed for Nausea or Vomiting  Qty: 20 tablet, Refills: 0      apixaban (ELIQUIS) 2.5 MG TABS tablet Take 1 tablet by mouth 2 times daily  Qty: 60 tablet, Refills: 5      clopidogrel (PLAVIX) 75 MG tablet Take 1 tablet by mouth daily  Qty: 30 tablet, Refills: 3      furosemide (LASIX) 40 MG tablet Take 1 tablet by mouth daily  Qty: 30 tablet, Refills: 1      insulin glargine (LANTUS) 100 UNIT/ML injection vial Inject 75 Units into the skin nightly      Lancets MISC 1 each by Does not apply route daily  Qty: 100 each, Refills: 3      glucose monitoring kit (FREESTYLE) monitoring kit 1 each by Does not apply route once for 1 dose. Qty: 1 kit, Refills: 0           Current Discharge Medication List          Discharge ROS:  A complete review of systems was asked and negative . Discharge Exam:  Estimated body mass index is 42.67 kg/m² as calculated from the following:    Height as of this encounter: 5' 10\" (1.778 m). Weight as of this encounter: 297 lb 6.4 oz (134.9 kg). /79   Pulse 72   Temp 97.8 °F (36.6 °C) (Oral)   Resp 16   Ht 5' 10\" (1.778 m)   Wt 297 lb 6.4 oz (134.9 kg)   SpO2 96%   BMI 42.67 kg/m²   General appearance:  NAD  Heart[de-identified] Normal s1/s2, RRR, no murmurs, gallops, or rubs. No leg edema  Lungs:  Clear to auscultation, bilaterally without Rales/Wheezes/Rhonchi. Abdomen: Soft, non-tender, non-distended, bowel sounds present  Musculoskeletal:   no cyanosis, no edema  Neurologic:  Cranial nerves: II-XII intact, grossly non-focal.  Psychiatric:  A & O x3      Labs:  For convenience and continuity at follow-up the following most recent labs are provided:    Lab Results   Component Value Date    WBC 7.7 10/01/2021    HGB 11.0 10/01/2021    HCT 33.2 10/01/2021    MCV 88.3 10/01/2021     10/01/2021     10/18/2021    K 5.3 10/18/2021     10/18/2021    CO2 20 10/18/2021    BUN 61 10/18/2021    CREATININE 2.2 10/18/2021    CALCIUM 8.7 10/18/2021    PHOS 5.0 10/18/2021    BNP 10 05/01/2013    ALKPHOS 92 09/17/2021    ALT 11 09/17/2021    AST 12 09/17/2021    BILITOT 0.2 09/17/2021    LABALBU 3.4 10/18/2021    LABALBU 48 03/27/2021    LDLCALC 92 07/24/2017    TRIG 188 05/13/2021     Lab Results   Component Value Date    INR 0.86 09/30/2021    INR 1.02 03/22/2019    INR 1.21 09/15/2018       Results for orders placed or performed during the hospital encounter of 12/21/21   Hemoglobin A1c   Result Value Ref Range    Hemoglobin A1C 9.2 (H) 4.2 - 6.3 %    eAG 217 mg/dL   Brain natriuretic peptide   Result Value Ref Range    Pro-BNP 1,031 (H) <300 PG/ML   D-dimer, quantitative   Result Value Ref Range    D-Dimer, Quant 268 (H) <230 NG/mL(DDU)   Troponin   Result Value Ref Range    Troponin T 0.044 (H) <0.01 NG/ML   POCT Glucose   Result Value Ref Range    POC Glucose 293 (H) 70 - 99 MG/DL   POCT Glucose   Result Value Ref Range    POC Glucose 238 (H) 70 - 99 MG/DL   POCT Glucose   Result Value Ref Range    POC Glucose 262 (H) 70 - 99 MG/DL   POCT Glucose   Result Value Ref Range    POC Glucose 225 (H) 70 - 99 MG/DL   POCT Glucose   Result Value Ref Range    POC Glucose 266 (H) 70 - 99 MG/DL   POCT Glucose   Result Value Ref Range    POC Glucose 229 (H) 70 - 99 MG/DL   POCT Glucose   Result Value Ref Range    POC Glucose 191 (H) 70 - 99 MG/DL   EKG 12 Lead   Result Value Ref Range    Ventricular Rate 82 BPM    Atrial Rate 82 BPM    P-R Interval 174 ms    QRS Duration 78 ms    Q-T Interval 380 ms    QTc Calculation (Bazett) 443 ms    P Axis 34 degrees    R Axis 12 degrees    T Axis 58 degrees    Diagnosis       Normal sinus rhythm  Normal ECG  When compared with ECG of 17-SEP-2021 16:48,  No significant change was found  Confirmed by [facility] [place] PAK ([id number]) on 12/21/2021 2:33:38 PM           Chart review shows recent radiographs:  NM MYOCARDIAL SPECT REST EXERCISE OR RX    Result Date: 12/22/2021  Cardiac Perfusion Imaging   Demographics   Patient Name      Ehsan HAWKINS     Date of study        12/22/2021   Date of Birth     1975         Gender               Male   Age               55 year(s)         Race                    Patient Number    [id number]         Room Number          3716   Visit Number      [id number]          Height               70 inches   Corporate ID      [id number]           Weight               297 pounds   Accession Number  [id number]                                        NM Technologist      Zack Chowdhury, Rolando Garcia, Sherlon Siemens, Annemarie Enamorado MD        Cardiologist         Sukhi PAK   Conclusions   Summary  Normal tracer uptake in all segments of myocardium on stress ans rest  images. Normal Lexiscan nuclear scintigraphic study suggestive of normal  myocardial perfusion. Gated images demonstrate normal left ventricular  systolic function with EF of 60 %. Signatures   ------------------------------------------------------------------  Electronically signed by Christa Howe MD  (Interpreting cardiologist) on 12/22/2021 at 12:47  ------------------------------------------------------------------  Procedure Procedure Type:   Nuclear Stress Test:Pharmacological, Myocardial Perfusion Imaging with  Pharm, NM MYOCARDIAL SPECT REST EXERCISE OR RX  Indications: Chest pain. Risk Factors   The patient risk factors include:obesity, hypercholesterolemia,  hypertension, family history of premature CAD, diabetes mellitus and chronic  lung disease. Stress Protocols   Resting ECG  Normal sinus rhythm.    Resting HR:70 bpm  Resting BP:118/78 mmHg Stress Protocol:Pharmacologic - Lexiscan  Peak HR:76 bpm               HR/BP product:[id number]  Peak BP:145/92 mmHg  Predicted HR: 174 bpm  % of predicted HR: 44   Exercise duration: 01:00 min   Symptoms  No symptoms with Lexiscan infusion. Procedure Medications   - Lexiscan I.V. bolus (over 15sec.) 0.4 mg admininstered @ 12/22/2021 10:35. Imaging Protocols   Rest                             Stress   Isotope:Sestamibi 99mTc          Isotope: Sestamibi 99mTc  Isotope dose:10.2 mCi            Isotope dose:30.9 mCi  Administration route: I.V. Administration route: I.V. Injection Date:12/22/2021 08:15  Injection Date:12/22/2021 10:35  Scan Date:12/22/2021 09:00       Scan Date:12/22/2021 11:20   Technique:        SPECT          Technique:        Gated                                                     SPECT   Procedure Description   Upon patient arrival, the patient is identified using two identifiers and  the physician order is verified. An IV is established and 8-11mCi of 99mTc  Sestamibi is intravenously injected and followed with 10mL 0.9% Normal  Saline flush. A circulation period of 45 minutes occurs prior to resting  SPECT imaging. After imaging is complete the patient is escorted to the  stress lab. The patient is connected to the ECG and blood pressure is  measured. The RN starts the stress portion of the exam and rapidly  intravenously injects Lexiscan (regadenosine) 0.4mg over a period of 10 to15  seconds and follows with 5mL 0.9% Normal Saline flush. Immediately following  the Nuclear Technologist intravenously injects 22-33mCi of 99mTc Sestamibi  and 5mL 0.9% Normal Saline flush. After completion, recovery, and removal of  the IV, the patient rests during the second circulation period of 45  minutes. Final stress SPECT gated imaging is performed. The patient may  return home or to their room after stress imaging.  The images are processed  and final charting is completed and sent to the appropriate cardiologist for  interpretation and reporting. Perfusion Interpretation   Normal tracer uptake in all segments of myocardium on stress ans rest  images. Imaging Results    Summed scores     - Summed stress score: 10     - Summed rest score: 9     - Summed difference score:    1   Rest ejection  Ejection fraction:39 %  EDV :114 ml  ESV :70 ml  Stroke volume :44 ml  Medical History   Accession#:  [id number]  Admission Data Admission date: 12/21/2021 Admission Time: 07:39 Hospital Status: Outpatient. EKG     Rhythm: normal sinus       Immunization History   Administered Date(s) Administered    Influenza 10/15/2013    Influenza, Quadv, 6 mo and older, IM, PF (Flulaval, Fluarix) 09/19/2018    Pneumococcal Polysaccharide ([id number]) 05/02/2013         The patient was seen and examined on day of discharge and this discharge summary is in conjunction with any daily progress note from day of discharge. Time Spent on discharge is   >35  min  in the examination, evaluation, counseling and review of medications and discharge plan.             SignedTahir Iqbal MD   12/22/2021

## 2021-12-22 NOTE — PROGRESS NOTES
Reviewed discharge instructions, pt verbalized understanding. Denies further needs. Discharging home at this time.

## 2021-12-28 ENCOUNTER — HOSPITAL ENCOUNTER (OUTPATIENT)
Dept: WOUND CARE | Age: 46
Discharge: HOME | End: 2021-12-28
Payer: MEDICARE

## 2021-12-28 VITALS
HEART RATE: 88 BPM | RESPIRATION RATE: 18 BRPM | TEMPERATURE: 98.2 F | SYSTOLIC BLOOD PRESSURE: 157 MMHG | DIASTOLIC BLOOD PRESSURE: 95 MMHG

## 2021-12-28 DIAGNOSIS — L97.425 DIABETIC ULCER OF LEFT MIDFOOT ASSOCIATED WITH TYPE 2 DIABETES MELLITUS, WITH MUSCLE INVOLVEMENT WITHOUT EVIDENCE OF NECROSIS (HCC): Primary | ICD-10-CM

## 2021-12-28 DIAGNOSIS — E11.621 DIABETIC ULCER OF LEFT MIDFOOT ASSOCIATED WITH TYPE 2 DIABETES MELLITUS, WITH MUSCLE INVOLVEMENT WITHOUT EVIDENCE OF NECROSIS (HCC): Primary | ICD-10-CM

## 2021-12-28 PROCEDURE — 11042 DBRDMT SUBQ TIS 1ST 20SQCM/<: CPT

## 2021-12-28 RX ORDER — GENTAMICIN SULFATE 1 MG/G
OINTMENT TOPICAL ONCE
Status: CANCELLED | OUTPATIENT
Start: 2021-12-28 | End: 2021-12-28

## 2021-12-28 RX ORDER — LIDOCAINE HYDROCHLORIDE 40 MG/ML
SOLUTION TOPICAL ONCE
Status: CANCELLED | OUTPATIENT
Start: 2021-12-28 | End: 2021-12-28

## 2021-12-28 RX ORDER — LIDOCAINE 50 MG/G
OINTMENT TOPICAL ONCE
Status: CANCELLED | OUTPATIENT
Start: 2021-12-28 | End: 2021-12-28

## 2021-12-28 RX ORDER — LIDOCAINE 40 MG/G
CREAM TOPICAL ONCE
Status: CANCELLED | OUTPATIENT
Start: 2021-12-28 | End: 2021-12-28

## 2021-12-28 RX ORDER — BACITRACIN, NEOMYCIN, POLYMYXIN B 400; 3.5; 5 [USP'U]/G; MG/G; [USP'U]/G
OINTMENT TOPICAL ONCE
Status: CANCELLED | OUTPATIENT
Start: 2021-12-28 | End: 2021-12-28

## 2021-12-28 RX ORDER — BETAMETHASONE DIPROPIONATE 0.05 %
OINTMENT (GRAM) TOPICAL ONCE
Status: CANCELLED | OUTPATIENT
Start: 2021-12-28 | End: 2021-12-28

## 2021-12-28 RX ORDER — BACITRACIN ZINC AND POLYMYXIN B SULFATE 500; 1000 [USP'U]/G; [USP'U]/G
OINTMENT TOPICAL ONCE
Status: CANCELLED | OUTPATIENT
Start: 2021-12-28 | End: 2021-12-28

## 2021-12-28 RX ORDER — GINSENG 100 MG
CAPSULE ORAL ONCE
Status: CANCELLED | OUTPATIENT
Start: 2021-12-28 | End: 2021-12-28

## 2021-12-28 RX ORDER — LIDOCAINE HYDROCHLORIDE 40 MG/ML
SOLUTION TOPICAL ONCE
Status: DISCONTINUED | OUTPATIENT
Start: 2021-12-28 | End: 2021-12-29 | Stop reason: HOSPADM

## 2021-12-28 RX ORDER — CLOBETASOL PROPIONATE 0.5 MG/G
OINTMENT TOPICAL ONCE
Status: CANCELLED | OUTPATIENT
Start: 2021-12-28 | End: 2021-12-28

## 2021-12-28 ASSESSMENT — PAIN DESCRIPTION - DESCRIPTORS: DESCRIPTORS: SORE

## 2021-12-28 ASSESSMENT — PAIN SCALES - GENERAL: PAINLEVEL_OUTOF10: 6

## 2021-12-28 ASSESSMENT — PAIN DESCRIPTION - PROGRESSION: CLINICAL_PROGRESSION: NOT CHANGED

## 2021-12-28 ASSESSMENT — PAIN DESCRIPTION - ORIENTATION: ORIENTATION: LEFT

## 2021-12-28 ASSESSMENT — PAIN DESCRIPTION - LOCATION: LOCATION: FOOT

## 2021-12-28 ASSESSMENT — PAIN DESCRIPTION - ONSET: ONSET: ON-GOING

## 2021-12-28 ASSESSMENT — PAIN DESCRIPTION - FREQUENCY: FREQUENCY: CONTINUOUS

## 2021-12-28 ASSESSMENT — PAIN DESCRIPTION - PAIN TYPE: TYPE: ACUTE PAIN

## 2021-12-28 ASSESSMENT — PAIN - FUNCTIONAL ASSESSMENT: PAIN_FUNCTIONAL_ASSESSMENT: PREVENTS OR INTERFERES SOME ACTIVE ACTIVITIES AND ADLS

## 2021-12-28 NOTE — PROGRESS NOTES
Wound Care Center Progress Note With Procedure    Chente Choe  AGE: 55 y.o. GENDER: male  : 1975  EPISODE DATE:  2021     Subjective:     Chief Complaint   Patient presents with    Wound Check         HISTORY of PRESENT ILLNESS      Chente Choe is a 55 y.o. male who presents today for wound subsegment attention of the left foot. He is status post third metatarsal head resection left foot (2021): Rosamaria Cheatham Says that he is doing well overall with no pain to his foot. No drainage. Does admit to some calf pain to the inside aspect of his left leg. Denies any constitutional symptoms. Denies any chest pain or shortness of breath. PAST MEDICAL HISTORY        Diagnosis Date    Abscess     scrotal    Acute renal failure (ARF) ([address] 2019    Anxiety associated with depression     Anxiety associated with depression     Back pain 2012    Chest pain 2013    Diabetic nephropathy ([address]     Diabetic neuropathy ([address] 2011    Diabetic ulcer of left midfoot associated with type 2 diabetes mellitus, with fat layer exposed ([address] 2017    Diverticulosis     C scope + Dr. Leah Champagne DM (diabetes mellitus), type 2 ([address]     DR. 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 Leah Champagne Necrotizing fasciitis [facility])     Nose fracture     Panic attacks     Pericarditis     Hospitalized with s/p heart cath normal    Peripheral autonomic neuropathy due to diabetes mellitus ([address]     Axonal EMG- NCS, 2011    Sebaceous cyst 2011    URI (upper respiratory infection) 2012    WD-Wound, surgical, infected, initial encounter 2017    Wrist fracture 1986       PAST SURGICAL HISTORY    Past Surgical History:   Procedure Laterality Date    ABDOMEN SURGERY      CARDIAC CATHETERIZATION  2003, 2013    Normal (Dr. Igor Meza)    COLONOSCOPY  6/2/2011    Pandiverticulosis, Nonbleeding internal hemorrhoids, Repeat colonoscopy at age 48- Dr. Ruth Velasquez Left 12/21/2021    EXCISION OF HEAD LEFT 2ND METATARSAL performed by Nicolette Mansfield DPM at [place] [address]    \"had reconstruction surgery on nose a year after the other nose surgery\"    OTHER SURGICAL HISTORY Right 05/22/2015    I & D right great toe with partial amputation    OTHER SURGICAL HISTORY  12/04/2017    inc and drainage of abcess    MD DEEP INCIS FOOT BONE INFECTN Left 9/22/2018    LEFT FOOT DEBRIDEMENT INCISION AND DRAINAGE TOP AND BOTTOM performed by Tim Chaudhari DPM at [facility] N/A 5/15/2021    SCROTAL AND PERINEAL DEBRIDEMENT performed by Mindi Phan MD at [address] [place] [address] 5/16/2021    SCROTAL RE-DEBRIDEMENT  INCISION AND DRAINAGE performed by Mindi Phan MD at [address] [place], [place] [id number]    sebaceous cyst removal times 4     TOE AMPUTATION      right great toe    TOE AMPUTATION Right 3/23/2019    TOE AMPUTATION RIGHT 5TH TOE performed by Tim Chaudhari DPM at [place] [address] TOE AMPUTATION Right 8/6/2019    TOE AMPUTATION RIGHT 4TH TOE performed by Tim Chaudhari DPM at [address] [place] [address] UPPER GASTROINTESTINAL ENDOSCOPY  06/2/2011    Mild gastritis with moderatley severe antritis, small hiatal hernia, Dr. Yogi Blanco N/A 1/12/2019    EGD BIOPSY performed by Leanne Young MD at [place] [address] History   Problem Relation Age of Onset    Cancer Mother         ?site    Stroke Mother     Bleeding Prob Mother     Diabetes Father     Heart Disease Father     High Blood Pressure Father     Obesity Father     Kidney Disease Father     Diabetes Sister     High Blood Pressure Sister     Mental Illness Sister     Obesity Sister     High Blood Pressure Other     Mental Illness Other         bipolar    Other Son         cyst in ear canal    ADHD Daughter        SOCIAL HISTORY    Social History     Tobacco Use    Smoking status: Current Some Day Smoker     Years: 20.00     Types: Cigarettes    Smokeless tobacco: Never Used    Tobacco comment: Smokes when drinking/social   Vaping Use    Vaping Use: Never used   Substance Use Topics    Alcohol use: Yes     Comment: occ    Drug use: Yes     Frequency: 7.0 times per week     Types: Marijuana [facility]ul[facility] Saira)     Comment: 12/20/21 @ 2000       ALLERGIES    Allergies   Allergen Reactions    Adhesive Tape Rash    Doxycycline Nausea And Vomiting    Reglan [Metoclopramide] Anxiety       MEDICATIONS    Current Outpatient Medications on File Prior to Encounter   Medication Sig Dispense Refill    sulfamethoxazole-trimethoprim (BACTRIM DS;SEPTRA DS) 800-160 MG per tablet Take 1 tablet by mouth 2 times daily for 7 days 14 tablet 0    apixaban (ELIQUIS) 2.5 MG TABS tablet Take 1 tablet by mouth 2 times daily 60 tablet 5    clopidogrel (PLAVIX) 75 MG tablet Take 1 tablet by mouth daily 30 tablet 3    insulin glargine (LANTUS) 100 UNIT/ML injection vial Inject 75 Units into the skin nightly      insulin aspart (NOVOLOG RELION) 100 UNIT/ML injection vial Inject 35 Units into the skin 3 times daily (before meals)      atorvastatin (LIPITOR) 40 MG tablet Take 1 tablet by mouth nightly 30 tablet 3    metoprolol tartrate (LOPRESSOR) 25 MG tablet Take 1 tablet by mouth 2 times daily 60 tablet 3    amLODIPine (NORVASC) 10 MG tablet Take 1 tablet by mouth daily 30 tablet 3    chlorthalidone (HYGROTON) 25 MG tablet Take 1 tablet by mouth daily 30 tablet 3    sertraline (ZOLOFT) 50 MG tablet Take 2 tablets by mouth daily 30 tablet 3    aspirin 81 MG EC tablet Take 1 tablet by mouth daily 30 tablet 3    linagliptin (TRADJENTA) 5 MG tablet Take 1 tablet by mouth daily 30 tablet 5    ondansetron (ZOFRAN) 4 MG tablet Take 1 tablet by mouth every 8 hours as needed for Nausea or Vomiting 20 tablet 0    Lancets MISC 1 each by Does not apply route daily 100 each 3    furosemide (LASIX) 40 MG tablet Take 1 tablet by mouth daily 30 tablet 1    glucose monitoring kit (FREESTYLE) monitoring kit 1 each by Does not apply route once for 1 dose. 1 kit 0     No current facility-administered medications on file prior to encounter. REVIEW OF SYSTEMS    Pertinent items are noted in HPI. Constitutional: Negative for systemic symptoms including fever, chills and malaise. Objective:      BP (!) 157/95   Pulse 88   Temp 98.2 °F (36.8 °C) (Temporal)   Resp 18     PHYSICAL EXAM    General: The patient is in no acute distress. Mental status:  Patient is appropriate, is  oriented to place and plan of care. Dermatologic exam: Visual inspection of the periwound reveals the skin to be well hydrated and dry  Wound exam: see wound description below in procedure note. Pain on palpation medial aspect of the left calf      Assessment:     Problem List Items Addressed This Visit     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 type 2 diabetes mellitus, with muscle involvement without evidence of necrosis ([address] - Primary    Relevant Medications    lidocaine (XYLOCAINE) 4 % external solution (Start on 12/28/2021  3:15 PM)    Other Relevant Orders    Initiate Outpatient Wound Care Protocol        Procedure Note    Indications:  Based on my examination of this patient's wound(s) today, sharp excision into necrotic subcutaneous tissue is required to promote healing and evaluate the extent of previous healing.     Performed by: Dhara Salcedo DPM    Consent obtained: Yes    Time out taken:  Yes    Pain Control: Anesthetic  Anesthetic: 4% Lidocaine Liquid Topical     Debridement:Non-excisional Debridement    Using #15 blade scalpel the wound(s) was/were sharply debrided down through and including the removal of subcutaneous tissue. Devitalized Tissue Debrided:  callus    Pre Debridement Measurements:  Are located in the Wound Documentation Flow Sheet    All active wounds listed below with today's date are evaluated  Wound(s)    debrided this date include # : 1     Post  Debridement Measurements:  Wound 12/08/17 #3 abdoment (onset 3 days ago) SURGICAL (Active)   Number of days: 1481       Wound 12/08/17 #4 Lt plantar (onset 6 months ago) DIABETIC DAVE 1 (Active)   Number of days: 1481       Wound 05/18/18 # 5 LEFT PLANTAR (ONSET 1 YEAR AGO) DM WAG 1 (Active)   Number of days: 1320       Wound 09/17/18 Left dorsal foot and 4th toe amp site 9/19/18 (Active)   Number of days: 1197       Incision 05/22/15 Foot Right (Active)   Number of days: 3275       Incision 12/04/17 Abdomen Mid (Active)   Number of days: 3135       Wound 01/11/19 left plantar foot wound (Active)   Number of days: 0880       Wound 03/21/19 Toe (Comment  which one) Anterior;Right (Active)   Number of days: 1013       Wound 03/21/19 Foot Left;Posterior hard callus area (Active)   Number of days: 1013       Wound 03/24/21 Left;Plantar (Active)   Number of days: 279       Wound 05/13/21 Left;Plantar (Active)   Number of days: 228       Wound 05/17/21 Groin Right scrotum extends to lower buttock (Active)   Number of days: 225       Wound 09/21/21 Foot Left;Plantar #1 (Active)   Wound Image   12/28/21 1452   Wound Etiology Diabetic Dave 2 12/28/21 1452   Dressing Status Clean;Dry; Intact 12/22/21 0825   Wound Cleansed Wound cleanser 12/28/21 1452   Offloading for Diabetic Foot Ulcers Forefoot offloading shoe 12/28/21 1452   Wound Length (cm) 0.4 cm 12/28/21 1452   Wound Width (cm) 0.3 cm 12/28/21 1452   Wound Depth (cm) 0.1 cm 12/28/21 1452   Wound Surface Area (cm^2) 0.12 cm^2 12/28/21 1452   Change in Wound Size % (l*w) 98.8 12/28/21 1452   Wound Volume (cm^3) 0.012 cm^3 12/28/21 1452   Wound Healing % 100 12/28/21 1451 Post-Procedure Length (cm) 0.4 cm 12/28/21 1500   Post-Procedure Width (cm) 0.3 cm 12/28/21 1500   Post-Procedure Depth (cm) 0.1 cm 12/28/21 1500   Post-Procedure Surface Area (cm^2) 0.12 cm^2 12/28/21 1500   Post-Procedure Volume (cm^3) 0.012 cm^3 12/28/21 1500   Distance Tunneling (cm) 0 cm 12/28/21 1452   Tunneling Position ___ O'Clock 0 12/28/21 1452   Undermining Starts ___ O'Clock 0 12/28/21 1452   Undermining Ends___ O'Clock 0 12/28/21 1452   Undermining Maxium Distance (cm) 0 12/28/21 1452   Wound Assessment Dry 12/28/21 1452   Drainage Amount Moderate 12/28/21 1452   Drainage Description Serosanguinous 12/28/21 1452   Odor None 12/28/21 1452   Shazia-wound Assessment Hyperkeratosis (callous) 12/28/21 1452   Margins Defined edges 12/28/21 1452   Wound Thickness Description not for Pressure Injury Full thickness 12/28/21 1452   Number of days: 98       Wound 12/28/21 Foot Anterior; Left #3 Left Dorsal Foot (Active)   Wound Image   12/28/21 1452   Wound Etiology Non-Healing Surgical 12/28/21 1452   Wound Cleansed Wound cleanser 12/28/21 1452   Offloading for Diabetic Foot Ulcers Forefoot offloading shoe 12/28/21 1452   Wound Length (cm) 2.5 cm 12/28/21 1452   Wound Width (cm) 0.3 cm 12/28/21 1452   Wound Depth (cm) 0.1 cm 12/28/21 1452   Wound Surface Area (cm^2) 0.75 cm^2 12/28/21 1452   Wound Volume (cm^3) 0.075 cm^3 12/28/21 1452   Distance Tunneling (cm) 0 cm 12/28/21 1452   Tunneling Position ___ O'Clock 0 12/28/21 1452   Undermining Starts ___ O'Clock 0 12/28/21 1452   Undermining Ends___ O'Clock 0 12/28/21 1452   Undermining Maxium Distance (cm) 0 12/28/21 1452   Wound Assessment Devitalized tissue 12/28/21 1452   Drainage Amount Moderate 12/28/21 1452   Drainage Description Serosanguinous 12/28/21 1452   Odor None 12/28/21 1452   Shazia-wound Assessment Intact 12/28/21 1452   Margins Attached edges 12/28/21 1452   Wound Thickness Description not for Pressure Injury Full thickness 12/28/21 1452   Number of days: 0       Percent of Wound(s) Debrided: approximately 80%    Total  Area  Debrided:  1 sq cm     Bleeding:  None    Hemostasis Achieved:  None needed    Procedural Pain:  0  / 10     Post Procedural Pain:  0 / 10     Response to treatment:  Well tolerated by patient. Status of wound progress and description from last visit:   stable. Plan:       Discharge Instructions       PHYSICIAN ORDERS AND DISCHARGE INSTRUCTIONS     NOTE: Upon discharge from the [address], you will receive a patient experience survey. We would be grateful if you would take the time to fill this survey out.     Wound care order history:                 BAIRON's   Right   1.38    Left  0.94           Date 9/21/2021              Vascular studies:   Date               Imaging:   Date               Cultures:   Date obtained on 9/21/2021              Grafts:  Date               Antibiotics:               Earlier Wound care treatments:                          Primary care physician     Continuing wound care orders and information:              Residence:  Private               Continue [facility]: [facility]     Wound Medications:              IMPBT cleansing:                           ZL not scrub or use excessive force.                          Wash hands with soap and water before and after dressing changes.                           Prior to applying a clean dressing, cleanse wound with normal saline,                                wound cleanser, or mild soap and water.                           Ask the physician or nurse before getting the wound(s) wet in a shower              Daily Wound management:                          Keep weight off wounds and reposition every 2 hours.                          Avoid standing for long periods of time.                          MKTXW wraps/stockings in AM and remove at bedtime.                          If swelling is present, elevate legs to the level of the heart or above for 30 minutes 4-5 times a day and/or when sitting.                                             When taking antibiotics take entire prescription as ordered by physician do not stop taking until medicine is all gone.                                                          Orders for this week: 2021                   Left Plantar Foot -- wash with soap and water, pat dry. Foam to medial/anterior  leg. Apply Qwick (cut center out)  to periwound with steri strips to hold in place to offload. Gentamicin and stimulin powder to wound bed.  Cover with Qwick. Wrap with coban 2 leave in place for 1 week       Right leg---TUBI F         Rx: [facility] [employer]  Consult:  Central [phone]           Follow up with Dr. Courtney Dutta 1 week in the wound care center  Call [phone] for any questions or concerns. Date__________   Time____________        Treatment Note    -Status post second metatarsal head resection left foot with chronic wounds sub second metatarsal head (2021)  -Incision site well coapted dorsally with sutures intact. No signs of infection.   -Venous duplex ordered for the left lower extremity given calf pain.  -Follow-up 1 week for dressing change or sooner if needed if any issues arise      Written Patient Dismissal Instructions Given            Electronically signed by Smooth Garcia DPM on 2021 at 3:05 PM

## 2021-12-28 NOTE — PROGRESS NOTES
Multilayer Compression Wrap   (Not Unna) Below the Knee    NAME:  Jimmy Lisa OF BIRTH:  1975  MEDICAL RECORD NUMBER:  [id number]  DATE:  12/28/2021    Multilayer compression wrap: Removed old Multilayer wrap if indicated and wash leg with mild soap/water. Applied moisturizing agent to dry skin as needed. Applied primary and secondary dressing as ordered. Applied multilayered dressing below the knee to left lower leg. Instructed patient/caregiver not to remove dressing and to keep it clean and dry. Instructed patient/caregiver on complications to report to provider, such as pain, numbness in toes, heavy drainage, and slippage of dressing. Instructed patient on purpose of compression dressing and on activity and exercise recommendations.       Electronically signed by Rosalva Palacios LPN on 95/53/8911 at 3:23 PM

## 2022-01-04 ENCOUNTER — HOSPITAL ENCOUNTER (OUTPATIENT)
Dept: ULTRASOUND IMAGING | Age: 47
Discharge: HOME | End: 2022-01-04
Payer: MEDICARE

## 2022-01-04 ENCOUNTER — HOSPITAL ENCOUNTER (OUTPATIENT)
Dept: WOUND CARE | Age: 47
Discharge: HOME | End: 2022-01-04

## 2022-01-04 DIAGNOSIS — E11.621 DIABETIC ULCER OF LEFT MIDFOOT ASSOCIATED WITH TYPE 2 DIABETES MELLITUS, WITH MUSCLE INVOLVEMENT WITHOUT EVIDENCE OF NECROSIS (HCC): ICD-10-CM

## 2022-01-04 DIAGNOSIS — L97.425 DIABETIC ULCER OF LEFT MIDFOOT ASSOCIATED WITH TYPE 2 DIABETES MELLITUS, WITH MUSCLE INVOLVEMENT WITHOUT EVIDENCE OF NECROSIS (HCC): ICD-10-CM

## 2022-01-04 PROCEDURE — 93971 EXTREMITY STUDY: CPT

## 2022-01-11 ENCOUNTER — HOSPITAL ENCOUNTER (OUTPATIENT)
Dept: WOUND CARE | Age: 47
Discharge: HOME | End: 2022-01-11
Payer: MEDICARE

## 2022-01-11 VITALS
RESPIRATION RATE: 18 BRPM | DIASTOLIC BLOOD PRESSURE: 73 MMHG | SYSTOLIC BLOOD PRESSURE: 108 MMHG | TEMPERATURE: 97.5 F | HEART RATE: 93 BPM

## 2022-01-11 DIAGNOSIS — L97.425 DIABETIC ULCER OF LEFT MIDFOOT ASSOCIATED WITH TYPE 2 DIABETES MELLITUS, WITH MUSCLE INVOLVEMENT WITHOUT EVIDENCE OF NECROSIS (HCC): Primary | ICD-10-CM

## 2022-01-11 DIAGNOSIS — E11.621 DIABETIC ULCER OF LEFT MIDFOOT ASSOCIATED WITH TYPE 2 DIABETES MELLITUS, WITH MUSCLE INVOLVEMENT WITHOUT EVIDENCE OF NECROSIS (HCC): Primary | ICD-10-CM

## 2022-01-11 PROCEDURE — 11042 DBRDMT SUBQ TIS 1ST 20SQCM/<: CPT

## 2022-01-11 RX ORDER — GINSENG 100 MG
CAPSULE ORAL ONCE
Status: CANCELLED | OUTPATIENT
Start: 2022-01-11 | End: 2022-01-11

## 2022-01-11 RX ORDER — GENTAMICIN SULFATE 1 MG/G
OINTMENT TOPICAL ONCE
Status: CANCELLED | OUTPATIENT
Start: 2022-01-11 | End: 2022-01-11

## 2022-01-11 RX ORDER — LIDOCAINE 40 MG/G
CREAM TOPICAL ONCE
Status: CANCELLED | OUTPATIENT
Start: 2022-01-11 | End: 2022-01-11

## 2022-01-11 RX ORDER — LIDOCAINE 50 MG/G
OINTMENT TOPICAL ONCE
Status: CANCELLED | OUTPATIENT
Start: 2022-01-11 | End: 2022-01-11

## 2022-01-11 RX ORDER — BACITRACIN, NEOMYCIN, POLYMYXIN B 400; 3.5; 5 [USP'U]/G; MG/G; [USP'U]/G
OINTMENT TOPICAL ONCE
Status: CANCELLED | OUTPATIENT
Start: 2022-01-11 | End: 2022-01-11

## 2022-01-11 RX ORDER — CLOBETASOL PROPIONATE 0.5 MG/G
OINTMENT TOPICAL ONCE
Status: CANCELLED | OUTPATIENT
Start: 2022-01-11 | End: 2022-01-11

## 2022-01-11 RX ORDER — LIDOCAINE 50 MG/G
OINTMENT TOPICAL ONCE
Status: DISCONTINUED | OUTPATIENT
Start: 2022-01-11 | End: 2022-01-12 | Stop reason: HOSPADM

## 2022-01-11 RX ORDER — BACITRACIN ZINC AND POLYMYXIN B SULFATE 500; 1000 [USP'U]/G; [USP'U]/G
OINTMENT TOPICAL ONCE
Status: CANCELLED | OUTPATIENT
Start: 2022-01-11 | End: 2022-01-11

## 2022-01-11 RX ORDER — LIDOCAINE HYDROCHLORIDE 40 MG/ML
SOLUTION TOPICAL ONCE
Status: CANCELLED | OUTPATIENT
Start: 2022-01-11 | End: 2022-01-11

## 2022-01-11 RX ORDER — BETAMETHASONE DIPROPIONATE 0.05 %
OINTMENT (GRAM) TOPICAL ONCE
Status: CANCELLED | OUTPATIENT
Start: 2022-01-11 | End: 2022-01-11

## 2022-01-11 ASSESSMENT — PAIN DESCRIPTION - PAIN TYPE: TYPE: ACUTE PAIN

## 2022-01-11 ASSESSMENT — PAIN DESCRIPTION - DESCRIPTORS: DESCRIPTORS: BURNING;STABBING

## 2022-01-11 ASSESSMENT — PAIN - FUNCTIONAL ASSESSMENT: PAIN_FUNCTIONAL_ASSESSMENT: PREVENTS OR INTERFERES SOME ACTIVE ACTIVITIES AND ADLS

## 2022-01-11 ASSESSMENT — PAIN DESCRIPTION - ONSET: ONSET: ON-GOING

## 2022-01-11 ASSESSMENT — PAIN SCALES - GENERAL: PAINLEVEL_OUTOF10: 7

## 2022-01-11 ASSESSMENT — PAIN DESCRIPTION - FREQUENCY: FREQUENCY: CONTINUOUS

## 2022-01-11 ASSESSMENT — PAIN DESCRIPTION - PROGRESSION: CLINICAL_PROGRESSION: NOT CHANGED

## 2022-01-11 ASSESSMENT — PAIN DESCRIPTION - ORIENTATION: ORIENTATION: LEFT

## 2022-01-11 ASSESSMENT — PAIN DESCRIPTION - LOCATION: LOCATION: FOOT

## 2022-01-11 NOTE — PROGRESS NOTES
Wound Care Center Progress Note With Procedure    Teo Burk  AGE: 55 y.o. GENDER: male  : 1975  EPISODE DATE:  2022     Subjective:     Chief Complaint   Patient presents with    Wound Check         HISTORY of PRESENT ILLNESS      Teo Burk is a 55 y.o. male who presents today for wound evaluation of chronic wound on the bottom of the left foot and status post second metatarsal head excision left foot. Patient did not make it to the appointment last week. Jf Parkinson he was getting the ultrasound of his left leg but I asked him to get to rule out blood clot. Does admit to some mild pain to the area. Has kept dressing on for the whole 2 weeks. Denies any constitutional symptoms. Denies any calf pain today. PAST MEDICAL HISTORY        Diagnosis Date    2010    scrotal    Acute renal failure (ARF) ([address] 2019    Anxiety associated with depression     Anxiety associated with depression     Back pain 2012    Chest pain 2013    Diabetic nephropathy ([address]     Diabetic neuropathy ([address] 2011    Diabetic ulcer of left midfoot associated with type 2 diabetes mellitus, with fat layer exposed ([address] 2017    Diverticulosis     C scope + Dr. Bruno Hays DM (diabetes mellitus), type 2 ([address]     DR. 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 Bruno Hays Necrotizing fasciitis [facility])     Nose fracture     Panic attacks     Pericarditis     Hospitalized with s/p heart cath normal    Peripheral autonomic neuropathy due to diabetes mellitus ([address]     Axonal EMG- N[facility], 2011    Sebaceous cyst 2011    URI (upper respiratory infection) 2012    WD-Wound, surgical, infected, initial encounter 2017    Wrist fracture 1986       PAST SURGICAL HISTORY    Past Surgical History:   Procedure Laterality Date    ABDOMEN SURGERY      CARDIAC CATHETERIZATION  2003, 2013    Normal (Dr. Gumaro Enriquez)    COLONOSCOPY  6/2/2011    Pandiverticulosis, Nonbleeding internal hemorrhoids, Repeat colonoscopy at age 48- Dr. Esmer Gonzlaes Left 12/21/2021    [place] performed by Sheela Mcmullen DPM at [place] [id number]    \"had reconstruction surgery on nose a year after the other nose surgery\"    OTHER SURGICAL HISTORY Right 05/22/2015    I & D right great toe with partial amputation    OTHER SURGICAL HISTORY  12/04/2017    inc and drainage of abcess    IN DEEP INCIS FOOT BONE INFECTN Left 9/22/2018    LEFT FOOT DEBRIDEMENT INCISION AND DRAINAGE TOP AND BOTTOM performed by Dixie Storey DPM at [facility] N/A 5/15/2021    SCROTAL AND PERINEAL DEBRIDEMENT performed by Cabrera Louis MD at [address] [place] [address] 5/16/2021    SCROTAL RE-DEBRIDEMENT  INCISION AND DRAINAGE performed by Cabrera Louis MD at [address] [place] [address] [id number]    sebaceous cyst removal times 4     TOE AMPUTATION      right great toe    TOE AMPUTATION Right 3/23/2019    TOE AMPUTATION RIGHT 5TH TOE performed by Dixie Storey DPM at [place] [address] TOE AMPUTATION Right 8/6/2019    TOE AMPUTATION RIGHT 4TH TOE performed by Dixie Storey DPM at [address] [place] [address] Se UPPER GASTROINTESTINAL ENDOSCOPY  06/2/2011    Mild gastritis with moderatley severe antritis, small hiatal hernia, Dr. Otero Friend N/A 1/12/2019    EGD BIOPSY performed by Errol Sosa MD at [place] [address] History   Problem Relation Age of Onset    Cancer Mother         ?site    Stroke Mother     Bleeding Prob Mother     Diabetes Father     Heart Disease Father     High Blood Pressure Father     Obesity Father     Kidney by mouth every 8 hours as needed for Nausea or Vomiting 20 tablet 0    Lancets MISC 1 each by Does not apply route daily 100 each 3    furosemide (LASIX) 40 MG tablet Take 1 tablet by mouth daily 30 tablet 1    glucose monitoring kit (FREESTYLE) monitoring kit 1 each by Does not apply route once for 1 dose. 1 kit 0     No current facility-administered medications on file prior to encounter. REVIEW OF SYSTEMS    Pertinent items are noted in HPI. Constitutional: Negative for systemic symptoms including fever, chills and malaise. Objective:      /73   Pulse 93   Temp 97.5 °F (36.4 °C) (Temporal)   Resp 18     PHYSICAL EXAM    General: The patient is in no acute distress. Mental status:  Patient is appropriate, is  oriented to place and plan of care. Dermatologic exam: Visual inspection of the periwound reveals the skin to be moist  Wound exam: see wound description below in procedure note. Stitches removed today central distal area of dehiscence of about 1 cm on the dorsal aspect of the foot. Mild maceration noted. No purulent drainage or malodor noted. No deep probing. Assessment:     Problem List Items Addressed This Visit     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 type 2 diabetes mellitus, with muscle involvement without evidence of necrosis ([facility] - Primary    Relevant Medications    lidocaine (XYLOCAINE) 5 % ointment (Start on 1/11/2022  2:30 PM)    Other Relevant Orders    Initiate Outpatient Wound Care Protocol    XR FOOT LEFT (MIN 3 VIEWS)        Procedure Note    Indications:  Based on my examination of this patient's wound(s) today, sharp excision into necrotic subcutaneous tissue is required to promote healing and evaluate the extent of previous healing.     Performed by: Juancarlos Bosch DPM    Consent obtained: Yes    Time out taken:  Yes    Pain Control: Anesthetic  Anesthetic: 5% Lidocaine Ointment Topical     Debridement:Non-excisional Debridement    Using #15 blade scalpel the wound(s) was/were sharply debrided down through and including the removal of subcutaneous tissue.         Devitalized Tissue Debrided:  callus    Pre Debridement Measurements:  Are located in the Wound Documentation Flow Sheet    All active wounds listed below with today's date are evaluated  Wound(s)    debrided this date include # : 2     Post  Debridement Measurements:  Wound 12/08/17 #3 abdoment (onset 3 days ago) SURGICAL (Active)   Number of days: 1495       Wound 12/08/17 #4 Lt plantar (onset 6 months ago) DIABETIC DAVE 1 (Active)   Number of days: 1495       Wound 05/18/18 # 5 LEFT PLANTAR (ONSET 1 YEAR AGO) DM WAG 1 (Active)   Number of days: 1333       Wound 09/17/18 Left dorsal foot and 4th toe amp site 9/19/18 (Active)   Number of days: 1211       Incision 05/22/15 Foot Right (Active)   Number of days: 2426       Incision 12/04/17 Abdomen Mid (Active)   Number of days: 8513       Wound 01/11/19 left plantar foot wound (Active)   Number of days: 1096       Wound 03/21/19 Toe (Comment  which one) Anterior;Right (Active)   Number of days: 1027       Wound 03/21/19 Foot Left;Posterior hard callus area (Active)   Number of days: 7383       Wound 03/24/21 Left;Plantar (Active)   Number of days: 293       Wound 05/13/21 Left;Plantar (Active)   Number of days: 242       Wound 05/17/21 Groin Right scrotum extends to lower buttock (Active)   Number of days: 239       Wound 09/21/21 Foot Left;Plantar #1 (Active)   Wound Image   01/11/22 1353   Wound Etiology Diabetic Dave 2 01/11/22 1353   Dressing Status New dressing applied 12/28/21 1520   Wound Cleansed Wound cleanser 01/11/22 1353   Offloading for Diabetic Foot Ulcers Forefoot offloading shoe 01/11/22 1353   Wound Length (cm) 0.2 cm 01/11/22 1353   Wound Width (cm) 0.2 cm 01/11/22 1353   Wound Depth (cm) 0.1 cm 01/11/22 1353   Wound Surface Area (cm^2) 0.04 cm^2 01/11/22 1353   Change in Wound Size % (l*w) 99.6 01/11/22 1353   Wound Volume (cm^3) 0.004 cm^3 01/11/22 1353   Wound Healing % 100 01/11/22 1353   Post-Procedure Length (cm) 0.2 cm 01/11/22 1413   Post-Procedure Width (cm) 0.2 cm 01/11/22 1413   Post-Procedure Depth (cm) 0.1 cm 01/11/22 1413   Post-Procedure Surface Area (cm^2) 0.04 cm^2 01/11/22 1413   Post-Procedure Volume (cm^3) 0.004 cm^3 01/11/22 1413   Distance Tunneling (cm) 0 cm 01/11/22 1353   Tunneling Position ___ O'Clock 0 01/11/22 1353   Undermining Starts ___ O'Clock 0 01/11/22 1353   Undermining Ends___ O'Clock 0 01/11/22 1353   Undermining Maxium Distance (cm) 0 01/11/22 1353   Wound Assessment Dry 01/11/22 1353   Drainage Amount Moderate 01/11/22 1353   Drainage Description Serosanguinous 01/11/22 1353   Odor None 01/11/22 1353   Shazia-wound Assessment Hyperkeratosis (callous) 01/11/22 1353   Margins Defined edges 01/11/22 1353   Wound Thickness Description not for Pressure Injury Full thickness 01/11/22 1353   Number of days: 111       Wound 12/28/21 Foot Anterior; Left #3 Left Dorsal Foot (Active)   Wound Image   01/11/22 1353   Wound Etiology Non-Healing Surgical 01/11/22 1353   Wound Cleansed Wound cleanser 01/11/22 1353   Offloading for Diabetic Foot Ulcers Forefoot offloading shoe 01/11/22 1353   Wound Length (cm) 2 cm 01/11/22 1353   Wound Width (cm) 0.4 cm 01/11/22 1353   Wound Depth (cm) 0.1 cm 01/11/22 1353   Wound Surface Area (cm^2) 0.8 cm^2 01/11/22 1353   Change in Wound Size % (l*w) -6.67 01/11/22 1353   Wound Volume (cm^3) 0.08 cm^3 01/11/22 1353   Wound Healing % -7 01/11/22 1353   Distance Tunneling (cm) 0 cm 01/11/22 1353   Tunneling Position ___ O'Clock 0 01/11/22 1353   Undermining Starts ___ O'Clock 0 01/11/22 1353   Undermining Ends___ O'Clock 0 01/11/22 1353   Undermining Maxium Distance (cm) 0 01/11/22 1353   Wound Assessment Slough 01/11/22 1353   Drainage Amount Moderate 01/11/22 1353   Drainage Description Serosanguinous 01/11/22 1353   Odor None 01/11/22 1353 Shazia-wound Assessment Maceration 01/11/22 1353   Margins Attached edges 01/11/22 1353   Wound Thickness Description not for Pressure Injury Full thickness 01/11/22 1353   Number of days: 13       Percent of Wound(s) Debrided: approximately 90%    Total  Area  Debrided:  1 sq cm     Bleeding:  None    Hemostasis Achieved:  not needed    Procedural Pain:  0  / 10     Post Procedural Pain:  0 / 10     Response to treatment:  Well tolerated by patient. Status of wound progress and description from last visit:   Stable. Plan:       Discharge Instructions         Discharge Instructions        PHYSICIAN ORDERS AND DISCHARGE INSTRUCTIONS     NOTE: Upon discharge from the [address], you will receive a patient experience survey. We would be grateful if you would take the time to fill this survey out.     Wound care order history:                 BAIRON's   Right   1.38    Left  0.94           Date 9/21/2021              Vascular studies:   Date               Imaging: Armand Penning ordered on 1/11/2022              Cultures:   Date obtained on 9/21/2021              Grafts:  Date               Antibiotics:               Earlier Wound care treatments:                          Primary care physician     Continuing wound care orders and information:              Residence:  Private               Continue [facility] health care with: [facility]     Wound Medications:              XPXYA cleansing:                           XD not scrub or use excessive force.                          Wash hands with soap and water before and after dressing changes.                           Prior to applying a clean dressing, cleanse wound with normal saline,                                wound cleanser, or mild soap and water.                           Ask the physician or nurse before getting the wound(s) wet in a shower              Daily Wound management:                          Keep weight off wounds and reposition every 2 hours.                          JJLEM standing for long periods of time.                          ENRFN wraps/stockings in AM and remove at bedtime.                          If swelling is present, elevate legs to the level of the heart or above for 30 minutes 4-5 times a day and/or when sitting.                                             When taking antibiotics take entire prescription as ordered by physician do not stop taking until medicine is all gone.                                                          Orders for this week: 2022                   Left Plantar Foot -- wash with soap and water, pat dry. Foam to medial/anterior leg.  Cover with Rosalina Fent. Wrap with coban 2 leave in place for 1 week      Left dorsal incision site--- wash with soap and water,pat dry. Apply gentamicin to wound bed and cover with xeroform and calcium alginate. Wrap with coban 2 leave in place for 1 week.     Right leg---TUBI F         Rx: [facility] [employer]  Consult:  [facility] [phone]           Follow up with Dr. Blake Brochraul 1 week in the wound care center  Call [phone] for any questions or concerns. Date__________   Time____________                Treatment Note    -Chronic wound subsecond metatarsal left foot stable. -Status post second metatarsal head excision of the left foot. Patient did not come to appointment last week and kept dressing on for 2 weeks. Some maceration to the dorsal aspect of the incision distally with some dehiscence at the incision site. Stitches were removed today without incident.  -No signs of active infection are noted to the area. -Left lower extremity ultrasound negative for DVT. -Dressing today to keep area dry and prevent moisture. -If patient is having any concerns of pain before I see him again he is to let us know so we can evaluate his foot for any signs of infection.  -No need for antibiotics at this time.   We will continue to monitor.  -Continue walking in

## 2022-01-11 NOTE — PROGRESS NOTES
Multilayer Compression Wrap   (Not Unna) Below the Knee    NAME:  Jignesh Ramirez OF BIRTH:  1975  MEDICAL RECORD NUMBER:  [id number]  DATE:  1/11/2022    Multilayer compression wrap: Removed old Multilayer wrap if indicated and wash leg with mild soap/water. Applied moisturizing agent to dry skin as needed. Applied primary and secondary dressing as ordered. Applied multilayered dressing below the knee to left lower leg. Instructed patient/caregiver not to remove dressing and to keep it clean and dry. Instructed patient/caregiver on complications to report to provider, such as pain, numbness in toes, heavy drainage, and slippage of dressing. Instructed patient on purpose of compression dressing and on activity and exercise recommendations.       Electronically signed by Hunter Ashley LPN on 3/61/0159 at 6:96 PM

## 2022-01-18 ENCOUNTER — HOSPITAL ENCOUNTER (OUTPATIENT)
Dept: WOUND CARE | Age: 47
Discharge: HOME | End: 2022-01-18
Payer: MEDICARE

## 2022-01-18 VITALS
DIASTOLIC BLOOD PRESSURE: 80 MMHG | TEMPERATURE: 98 F | HEART RATE: 93 BPM | SYSTOLIC BLOOD PRESSURE: 130 MMHG | RESPIRATION RATE: 20 BRPM

## 2022-01-18 DIAGNOSIS — L97.425 DIABETIC ULCER OF LEFT MIDFOOT ASSOCIATED WITH TYPE 2 DIABETES MELLITUS, WITH MUSCLE INVOLVEMENT WITHOUT EVIDENCE OF NECROSIS (HCC): Primary | ICD-10-CM

## 2022-01-18 DIAGNOSIS — E11.621 DIABETIC ULCER OF LEFT MIDFOOT ASSOCIATED WITH TYPE 2 DIABETES MELLITUS, WITH MUSCLE INVOLVEMENT WITHOUT EVIDENCE OF NECROSIS (HCC): Primary | ICD-10-CM

## 2022-01-18 PROCEDURE — 11042 DBRDMT SUBQ TIS 1ST 20SQCM/<: CPT

## 2022-01-18 RX ORDER — LIDOCAINE HYDROCHLORIDE 40 MG/ML
SOLUTION TOPICAL ONCE
Status: CANCELLED | OUTPATIENT
Start: 2022-01-18 | End: 2022-01-18

## 2022-01-18 RX ORDER — LIDOCAINE HYDROCHLORIDE 40 MG/ML
SOLUTION TOPICAL ONCE
Status: DISCONTINUED | OUTPATIENT
Start: 2022-01-18 | End: 2022-01-19 | Stop reason: HOSPADM

## 2022-01-18 RX ORDER — GINSENG 100 MG
CAPSULE ORAL ONCE
Status: CANCELLED | OUTPATIENT
Start: 2022-01-18 | End: 2022-01-18

## 2022-01-18 RX ORDER — LIDOCAINE 40 MG/G
CREAM TOPICAL ONCE
Status: CANCELLED | OUTPATIENT
Start: 2022-01-18 | End: 2022-01-18

## 2022-01-18 RX ORDER — BACITRACIN ZINC AND POLYMYXIN B SULFATE 500; 1000 [USP'U]/G; [USP'U]/G
OINTMENT TOPICAL ONCE
Status: CANCELLED | OUTPATIENT
Start: 2022-01-18 | End: 2022-01-18

## 2022-01-18 RX ORDER — CLOBETASOL PROPIONATE 0.5 MG/G
OINTMENT TOPICAL ONCE
Status: CANCELLED | OUTPATIENT
Start: 2022-01-18 | End: 2022-01-18

## 2022-01-18 RX ORDER — GENTAMICIN SULFATE 1 MG/G
OINTMENT TOPICAL ONCE
Status: CANCELLED | OUTPATIENT
Start: 2022-01-18 | End: 2022-01-18

## 2022-01-18 RX ORDER — BACITRACIN, NEOMYCIN, POLYMYXIN B 400; 3.5; 5 [USP'U]/G; MG/G; [USP'U]/G
OINTMENT TOPICAL ONCE
Status: CANCELLED | OUTPATIENT
Start: 2022-01-18 | End: 2022-01-18

## 2022-01-18 RX ORDER — LIDOCAINE 50 MG/G
OINTMENT TOPICAL ONCE
Status: CANCELLED | OUTPATIENT
Start: 2022-01-18 | End: 2022-01-18

## 2022-01-18 RX ORDER — BETAMETHASONE DIPROPIONATE 0.05 %
OINTMENT (GRAM) TOPICAL ONCE
Status: CANCELLED | OUTPATIENT
Start: 2022-01-18 | End: 2022-01-18

## 2022-01-18 ASSESSMENT — PAIN DESCRIPTION - PAIN TYPE: TYPE: ACUTE PAIN

## 2022-01-18 ASSESSMENT — PAIN DESCRIPTION - LOCATION: LOCATION: FOOT

## 2022-01-18 ASSESSMENT — PAIN DESCRIPTION - FREQUENCY: FREQUENCY: INTERMITTENT

## 2022-01-18 ASSESSMENT — PAIN DESCRIPTION - PROGRESSION: CLINICAL_PROGRESSION: NOT CHANGED

## 2022-01-18 ASSESSMENT — PAIN DESCRIPTION - DESCRIPTORS: DESCRIPTORS: ACHING;DULL

## 2022-01-18 ASSESSMENT — PAIN DESCRIPTION - ORIENTATION: ORIENTATION: LEFT

## 2022-01-18 ASSESSMENT — PAIN SCALES - GENERAL: PAINLEVEL_OUTOF10: 6

## 2022-01-18 ASSESSMENT — PAIN DESCRIPTION - ONSET: ONSET: ON-GOING

## 2022-01-18 ASSESSMENT — PAIN - FUNCTIONAL ASSESSMENT: PAIN_FUNCTIONAL_ASSESSMENT: PREVENTS OR INTERFERES SOME ACTIVE ACTIVITIES AND ADLS

## 2022-01-18 NOTE — PROGRESS NOTES
Wound Care Center Progress Note With Procedure    Alejandro Serrano  AGE: 55 y.o. GENDER: male  : 1975  EPISODE DATE:  2022     Subjective:     Chief Complaint   Patient presents with    Wound Check     left foot         HISTORY of PRESENT ILLNESS      Alejandro Serrano is a 55 y.o. male who presents today for wound evaluation of subset metatarsal head wound left foot as well as being status post second metatarsal head excision left foot. Patient says that he is doing well overall. No drainage or pain. Says that he was unable to get the x-rays I asked him to get because he did not have a car for the last week secondary to car trouble. Denies any constitutional symptoms. PAST MEDICAL HISTORY        Diagnosis Date    Abscess     scrotal    Acute renal failure (ARF) ([address] 2019    Anxiety associated with depression     Anxiety associated with depression     Back pain 2012    Chest pain 2013    Diabetic nephropathy ([address]     Diabetic neuropathy ([address] 2011    Diabetic ulcer of left midfoot associated with type 2 diabetes mellitus, with fat layer exposed ([address] 2017    Diverticulosis     C scope + Dr. Sussy Bundy DM (diabetes mellitus), type 2 ([address]     DR. 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 Sussy Bundy Necrotizing fasciitis [facility])     Nose fracture     Panic attacks     Pericarditis     Hospitalized with s/p heart cath normal    Peripheral autonomic neuropathy due to diabetes mellitus ([address]     Axonal EMG- NCS, 2011    Sebaceous cyst 2011    URI (upper respiratory infection) 2012    WD-Wound, surgical, infected, initial encounter 2017    Wrist fracture 1986       PAST SURGICAL HISTORY    Past Surgical History:   Procedure Laterality Date    ABDOMEN SURGERY      CARDIAC CATHETERIZATION  2003, 2013    Normal (Dr. Gumaro Enriquez)    COLONOSCOPY  6/2/2011    Pandiverticulosis, Nonbleeding internal hemorrhoids, Repeat colonoscopy at age 48- Dr. Esmer Gonzales Left 12/21/2021    EXCISION OF HEAD LEFT 2ND METATARSAL performed by Sheela Mcmullen DPM at [place] [address]    \"had reconstruction surgery on nose a year after the other nose surgery\"    OTHER SURGICAL HISTORY Right 05/22/2015    I & D right great toe with partial amputation    OTHER SURGICAL HISTORY  12/04/2017    inc and drainage of abcess    AK DEEP INCIS FOOT BONE INFECTN Left 9/22/2018    LEFT FOOT DEBRIDEMENT INCISION AND DRAINAGE TOP AND BOTTOM performed by Dixie Storey DPM at [facility] N/A 5/15/2021    SCROTAL AND PERINEAL DEBRIDEMENT performed by Cabrera Louis MD at [address] [place] [address] 5/16/2021    SCROTAL RE-DEBRIDEMENT  INCISION AND DRAINAGE performed by Cabrera Louis MD at [address] [place] [address] [phone]    sebaceous cyst removal times 4     TOE AMPUTATION      right great toe    TOE AMPUTATION Right 3/23/2019    TOE AMPUTATION RIGHT 5TH TOE performed by Dixie Storey DPM at [place] [address] TOE AMPUTATION Right 8/6/2019    TOE AMPUTATION RIGHT 4TH TOE performed by Dixie Storey DPM at [address] [place] [address] UPPER GASTROINTESTINAL ENDOSCOPY  06/2/2011    Mild gastritis with moderatley severe antritis, small hiatal hernia, Dr. Radha Navarrete N/A 1/12/2019    EGD BIOPSY performed [facility] Errol Sosa [facility]    Family History   Problem Relation Age of Onset    Cancer Mother         ?site    Stroke Mother     Bleeding Prob Mother     Diabetes Father     Heart Disease Father     High Blood Pressure Father     Obesity Father     Kidney Disease Father     Diabetes Sister    Olivier High Blood Pressure Sister     Mental Illness Sister     Obesity Sister     High Blood Pressure Other     Mental Illness Other         bipolar    Other Son         cyst in ear canal    ADHD Daughter        SOCIAL HISTORY    Social History     Tobacco Use    Smoking status: Current Some Day Smoker     Years: 20.00     Types: Cigarettes    Smokeless tobacco: Never Used    Tobacco comment: Smokes when drinking/social   Vaping Use    Vaping Use: Never used   Substance Use Topics    Alcohol use: Yes     Comment: occ    Drug use: Yes     Frequency: 7.0 times per week     Types: Marijuana Lanis Iris)     Comment: 12/20/21 @ 2000       ALLERGIES    Allergies   Allergen Reactions    Adhesive Tape Rash    Doxycycline Nausea And Vomiting    Reglan [Metoclopramide] Anxiety       MEDICATIONS    Current Outpatient Medications on File Prior to Encounter   Medication Sig Dispense Refill    apixaban (ELIQUIS) 2.5 MG TABS tablet Take 1 tablet by mouth 2 times daily 60 tablet 5    clopidogrel (PLAVIX) 75 MG tablet Take 1 tablet by mouth daily 30 tablet 3    insulin glargine (LANTUS) 100 UNIT/ML injection vial Inject 75 Units into the skin nightly      insulin aspart (NOVOLOG RELION) 100 UNIT/ML injection vial Inject 35 Units into the skin 3 times daily (before meals)      atorvastatin (LIPITOR) 40 MG tablet Take 1 tablet by mouth nightly 30 tablet 3    metoprolol tartrate (LOPRESSOR) 25 MG tablet Take 1 tablet by mouth 2 times daily 60 tablet 3    amLODIPine (NORVASC) 10 MG tablet Take 1 tablet by mouth daily 30 tablet 3    chlorthalidone (HYGROTON) 25 MG tablet Take 1 tablet by mouth daily 30 tablet 3    sertraline (ZOLOFT) 50 MG tablet Take 2 tablets by mouth daily 30 tablet 3    aspirin 81 MG EC tablet Take 1 tablet by mouth daily 30 tablet 3    linagliptin (TRADJENTA) 5 MG tablet Take 1 tablet by mouth daily 30 tablet 5    furosemide (LASIX) 40 MG tablet Take 1 tablet by mouth daily 30 tablet 1    ondansetron (ZOFRAN) 4 MG tablet Take 1 tablet by mouth every 8 hours as needed for Nausea or Vomiting 20 tablet 0    Lancets MISC 1 each by Does not apply route daily 100 each 3    glucose monitoring kit (FREESTYLE) monitoring kit 1 each by Does not apply route once for 1 dose. 1 kit 0     No current facility-administered medications on file prior to encounter. REVIEW OF SYSTEMS    Pertinent items are noted in HPI. Constitutional: Negative for systemic symptoms including fever, chills and malaise. Objective:      /80   Pulse 93   Temp 98 °F (36.7 °C) (Temporal)   Resp 20     PHYSICAL EXAM    General: The patient is in no acute distress. Mental status:  Patient is appropriate, is  oriented to place and plan of care. Dermatologic exam: Visual inspection of the periwound reveals the skin to be dry  Wound exam: see wound description below in procedure note      Assessment:     Problem List Items Addressed This Visit     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 type 2 diabetes mellitus, with muscle involvement without evidence of necrosis ([address] - Primary    Relevant Medications    lidocaine (XYLOCAINE) 4 % external solution    Other Relevant Orders    Initiate Outpatient Wound Care Protocol        Procedure Note    Indications:  Based on my examination of this patient's wound(s) today, sharp excision into necrotic subcutaneous tissue is required to promote healing and evaluate the extent of previous healing. Performed by: Frances Santos DPM    Consent obtained: Yes    Time out taken:  Yes    Pain Control: None needed       Debridement:Non-excisional Debridement    Using #15 blade scalpel the wound(s) was/were sharply debrided down through and including the removal of subcutaneous tissue.         Devitalized Tissue Debrided:  callus    Pre Debridement Measurements:  Are located in the Wound Documentation Flow Sheet    All active wounds listed below with today's date are evaluated  Wound(s)    debrided this date include # : 2     Post  Debridement Measurements:  Wound 12/08/17 #3 abdoment (onset 3 days ago) SURGICAL (Active)   Number of days: 1502       Wound 12/08/17 #4 Lt plantar (onset 6 months ago) DIABETIC DAVE 1 (Active)   Number of days: 1502       Wound 05/18/18 # 5 LEFT PLANTAR (ONSET 1 YEAR AGO) DM WAG 1 (Active)   Number of days: 1341       Wound 09/17/18 Left dorsal foot and 4th toe amp site 9/19/18 (Active)   Number of days: 6222       Incision 05/22/15 Foot Right (Active)   Number of days: 8632       Incision 12/04/17 Abdomen Mid (Active)   Number of days: 4273       Wound 01/11/19 left plantar foot wound (Active)   Number of days: 1103       Wound 03/21/19 Toe (Comment  which one) Anterior;Right (Active)   Number of days: 8365       Wound 03/21/19 Foot Left;Posterior hard callus area (Active)   Number of days: 4898       Wound 03/24/21 Left;Plantar (Active)   Number of days: 300       Wound 05/13/21 Left;Plantar (Active)   Number of days: 249       Wound 05/17/21 Groin Right scrotum extends to lower buttock (Active)   Number of days: 246       Wound 09/21/21 Foot Left;Plantar #1 (Active)   Wound Image   01/11/22 1353   Wound Etiology Diabetic Dave 2 01/18/22 1358   Dressing Status New dressing applied 01/11/22 1424   Wound Cleansed Wound cleanser 01/18/22 1358   Offloading for Diabetic Foot Ulcers Forefoot offloading shoe 01/18/22 1358   Wound Length (cm) 0.2 cm 01/18/22 1358   Wound Width (cm) 0.2 cm 01/18/22 1358   Wound Depth (cm) 0.1 cm 01/18/22 1358   Wound Surface Area (cm^2) 0.04 cm^2 01/18/22 1358   Change in Wound Size % (l*w) 99.6 01/18/22 1358   Wound Volume (cm^3) 0.004 cm^3 01/18/22 1358   Wound Healing % 100 01/18/22 1358   Post-Procedure Length (cm) 0.2 cm 01/18/22 1419   Post-Procedure Width (cm) 0.2 cm 01/18/22 1419   Post-Procedure Depth (cm) 0.1 cm 01/18/22 1419   Post-Procedure Surface Area (cm^2) 0.04 cm^2 01/18/22 1419   Post-Procedure Volume (cm^3) 0.004 cm^3 01/18/22 1419   Distance Tunneling (cm) 0 cm 01/18/22 1358   Tunneling Position ___ O'Clock 0 01/18/22 1358   Undermining Starts ___ O'Clock 0 01/18/22 1358   Undermining Ends___ O'Clock 0 01/18/22 1358   Undermining Maxium Distance (cm) 0 01/18/22 1358   Wound Assessment Dry 01/18/22 1358   Drainage Amount None 01/18/22 1358   Drainage Description Serosanguinous 01/11/22 1353   Odor None 01/18/22 1358   Shazia-wound Assessment Hyperkeratosis (callous) 01/18/22 1358   Margins Attached edges 01/18/22 1358   Wound Thickness Description not for Pressure Injury Full thickness 01/11/22 1353   Number of days: 118       Wound 12/28/21 Foot Anterior; Left #3 Left Dorsal Foot (Active)   Wound Image   01/11/22 1353   Wound Etiology Non-Healing Surgical 01/18/22 1358   Dressing Status New dressing applied 01/11/22 1424   Wound Cleansed Soap and water 01/18/22 1358   Offloading for Diabetic Foot Ulcers Forefoot offloading shoe 01/18/22 1358   Wound Length (cm) 1 cm 01/18/22 1358   Wound Width (cm) 0.2 cm 01/18/22 1358   Wound Depth (cm) 0.1 cm 01/18/22 1358   Wound Surface Area (cm^2) 0.2 cm^2 01/18/22 1358   Change in Wound Size % (l*w) 73.33 01/18/22 1358   Wound Volume (cm^3) 0.02 cm^3 01/18/22 1358   Wound Healing % 73 01/18/22 1358   Post-Procedure Length (cm) 1 cm 01/18/22 1419   Post-Procedure Width (cm) 0.2 cm 01/18/22 1419   Post-Procedure Depth (cm) 0.1 cm 01/18/22 1419   Post-Procedure Surface Area (cm^2) 0.2 cm^2 01/18/22 1419   Post-Procedure Volume (cm^3) 0.02 cm^3 01/18/22 1419   Distance Tunneling (cm) 0 cm 01/18/22 1358   Tunneling Position ___ O'Clock 0 01/18/22 1358   Undermining Starts ___ O'Clock 0 01/18/22 1358   Undermining Ends___ O'Clock 0 01/18/22 1358   Undermining Maxium Distance (cm) 0 01/18/22 1358   Wound Assessment Pink/red 01/18/22 1358   Drainage Amount Moderate 01/18/22 1358   Drainage Description Serosanguinous 01/18/22 1358   Odor None 01/18/22 1358   Shazia-wound Assessment Induration 01/18/22 1358   Margins Defined edges 01/18/22 1358   Wound Thickness Description not for Pressure Injury Full thickness 01/18/22 1358   Number of days: 20       Percent of Wound(s) Debrided: approximately 100%    Total  Area  Debrided:  1 sq cm     Bleeding:  None    Hemostasis Achieved:  by pressure and not needed    Procedural Pain:  0  / 10     Post Procedural Pain:  0 / 10     Response to treatment:  Well tolerated by patient. Status of wound progress and description from last visit:   Improved. Plan:       Discharge Instructions         Discharge Instructions        PHYSICIAN ORDERS AND DISCHARGE INSTRUCTIONS     NOTE: Upon discharge from the [address], you will receive a patient experience survey. We would be grateful if you would take the time to fill this survey out.     Wound care order history:                 BAIRON's   Right   1.38    Left  0.94           Date 9/21/2021              Vascular studies:   Date               Imaging: Hoang Dyers ordered on 1/11/2022              Cultures:   Date obtained on 9/21/2021              Grafts:  Date               Antibiotics:               Earlier Wound care treatments:                          Primary care physician     Continuing wound care orders and information:              Residence:  Private               Continue home health care with: [facility]     Wound Medications:              HOZPO cleansing:                           FN not scrub or use excessive force.                          Wash hands with soap and water before and after dressing changes.                           Prior to applying a clean dressing, cleanse wound with normal saline,                                wound cleanser, or mild soap and water.                           Ask the physician or nurse before getting the wound(s) wet in a shower              Daily Wound management:                          Keep weight off wounds and reposition every 2 hours.                          DHGJC standing for long periods of time.                          UUHWY wraps/stockings in AM and remove at bedtime.                          If swelling is present, elevate legs to the level of the heart or above for 30 minutes 4-5 times a day and/or when sitting.                                             When taking antibiotics take entire prescription as ordered by physician do not stop taking until medicine is all gone.                                                          Orders for this week: 2022                   Left Plantar Foot -- wash with soap and water, pat dry. Foam to medial/anterior leg.  Cover with Kerry Muss. Wrap with coban 2 leave in place for 1 week      Left dorsal incision site--- wash with soap and water,pat dry. Apply anasept and stimulin powder to wound bed and cover with calcium alginate. Wrap with coban 2 leave in place for 1 week.      Right leg---TUBI F         Rx: [facility] [employer]  Consult:  Central [phone]           Follow up with Dr. Chary Tavarez 1 week in the wound care center  Call [phone] for any questions or concerns. Date__________   Time____________               Treatment Note    -Status post second metatarsal head excision left foot.  -Plantar wound is continuing to heal well with no signs of infection.  -Distal aspect of the dorsal incision that has dehisced is doing much better. Filling in from the depth of the wound to superficial. No deep probing to bone.  Swelling is improved from previous assessment.  -Continue to keep dressing clean, dry, and intact and continue to ambulate in forefoot offloading shoe.  -Still want him to get left foot x-rays before I see him next time.  -Follow-up 1 week for recheck sooner if needed    Written Patient Dismissal Instructions Given            Electronically signed by Jefferson Carmichael DPM on 2022 at 2:30 PM

## 2022-01-18 NOTE — PROGRESS NOTES
Multilayer Compression Wrap   (Not Unna) Below the Knee    NAME:  Roger Forde OF BIRTH:  1975  MEDICAL RECORD NUMBER:  [id number]  DATE:  1/18/2022    Multilayer compression wrap: Removed old Multilayer wrap if indicated and wash leg with mild soap/water. Applied moisturizing agent to dry skin as needed. Applied primary and secondary dressing as ordered. Applied multilayered dressing below the knee to left lower leg. Instructed patient/caregiver not to remove dressing and to keep it clean and dry. Instructed patient/caregiver on complications to report to provider, such as pain, numbness in toes, heavy drainage, and slippage of dressing. Instructed patient on purpose of compression dressing and on activity and exercise recommendations.       Electronically signed by Licha Gooden LPN on 7/77/1621 at 3:08 PM

## 2022-01-24 ENCOUNTER — HOSPITAL ENCOUNTER (OUTPATIENT)
Dept: GENERAL RADIOLOGY | Age: 47
Discharge: HOME | End: 2022-01-24
Payer: MEDICARE

## 2022-01-24 ENCOUNTER — HOSPITAL ENCOUNTER (OUTPATIENT)
Age: 47
Discharge: HOME | End: 2022-01-24
Payer: MEDICARE

## 2022-01-24 DIAGNOSIS — E11.621 DIABETIC ULCER OF LEFT MIDFOOT ASSOCIATED WITH TYPE 2 DIABETES MELLITUS, WITH MUSCLE INVOLVEMENT WITHOUT EVIDENCE OF NECROSIS (HCC): ICD-10-CM

## 2022-01-24 DIAGNOSIS — L97.425 DIABETIC ULCER OF LEFT MIDFOOT ASSOCIATED WITH TYPE 2 DIABETES MELLITUS, WITH MUSCLE INVOLVEMENT WITHOUT EVIDENCE OF NECROSIS (HCC): ICD-10-CM

## 2022-01-24 PROCEDURE — 73630 X-RAY EXAM OF FOOT: CPT

## 2022-01-25 ENCOUNTER — HOSPITAL ENCOUNTER (OUTPATIENT)
Dept: WOUND CARE | Age: 47
Discharge: HOME | End: 2022-01-25

## 2022-02-01 ENCOUNTER — HOSPITAL ENCOUNTER (OUTPATIENT)
Dept: WOUND CARE | Age: 47
Discharge: HOME | End: 2022-02-01
Payer: MEDICARE

## 2022-02-01 VITALS
HEART RATE: 99 BPM | DIASTOLIC BLOOD PRESSURE: 104 MMHG | TEMPERATURE: 97.7 F | SYSTOLIC BLOOD PRESSURE: 176 MMHG | RESPIRATION RATE: 20 BRPM

## 2022-02-01 DIAGNOSIS — E11.621 DIABETIC ULCER OF LEFT MIDFOOT ASSOCIATED WITH TYPE 2 DIABETES MELLITUS, WITH MUSCLE INVOLVEMENT WITHOUT EVIDENCE OF NECROSIS (HCC): Primary | ICD-10-CM

## 2022-02-01 DIAGNOSIS — L97.425 DIABETIC ULCER OF LEFT MIDFOOT ASSOCIATED WITH TYPE 2 DIABETES MELLITUS, WITH MUSCLE INVOLVEMENT WITHOUT EVIDENCE OF NECROSIS (HCC): Primary | ICD-10-CM

## 2022-02-01 PROCEDURE — 11042 DBRDMT SUBQ TIS 1ST 20SQCM/<: CPT

## 2022-02-01 RX ORDER — LIDOCAINE HYDROCHLORIDE 40 MG/ML
SOLUTION TOPICAL ONCE
Status: CANCELLED | OUTPATIENT
Start: 2022-02-01 | End: 2022-02-01

## 2022-02-01 RX ORDER — LIDOCAINE 50 MG/G
OINTMENT TOPICAL ONCE
Status: CANCELLED | OUTPATIENT
Start: 2022-02-01 | End: 2022-02-01

## 2022-02-01 RX ORDER — BETAMETHASONE DIPROPIONATE 0.05 %
OINTMENT (GRAM) TOPICAL ONCE
Status: CANCELLED | OUTPATIENT
Start: 2022-02-01 | End: 2022-02-01

## 2022-02-01 RX ORDER — BACITRACIN ZINC AND POLYMYXIN B SULFATE 500; 1000 [USP'U]/G; [USP'U]/G
OINTMENT TOPICAL ONCE
Status: CANCELLED | OUTPATIENT
Start: 2022-02-01 | End: 2022-02-01

## 2022-02-01 RX ORDER — GENTAMICIN SULFATE 1 MG/G
OINTMENT TOPICAL ONCE
Status: CANCELLED | OUTPATIENT
Start: 2022-02-01 | End: 2022-02-01

## 2022-02-01 RX ORDER — LIDOCAINE 40 MG/G
CREAM TOPICAL ONCE
Status: CANCELLED | OUTPATIENT
Start: 2022-02-01 | End: 2022-02-01

## 2022-02-01 RX ORDER — GINSENG 100 MG
CAPSULE ORAL ONCE
Status: CANCELLED | OUTPATIENT
Start: 2022-02-01 | End: 2022-02-01

## 2022-02-01 RX ORDER — CLOBETASOL PROPIONATE 0.5 MG/G
OINTMENT TOPICAL ONCE
Status: CANCELLED | OUTPATIENT
Start: 2022-02-01 | End: 2022-02-01

## 2022-02-01 RX ORDER — BACITRACIN, NEOMYCIN, POLYMYXIN B 400; 3.5; 5 [USP'U]/G; MG/G; [USP'U]/G
OINTMENT TOPICAL ONCE
Status: CANCELLED | OUTPATIENT
Start: 2022-02-01 | End: 2022-02-01

## 2022-02-01 ASSESSMENT — PAIN - FUNCTIONAL ASSESSMENT: PAIN_FUNCTIONAL_ASSESSMENT: PREVENTS OR INTERFERES SOME ACTIVE ACTIVITIES AND ADLS

## 2022-02-01 ASSESSMENT — PAIN DESCRIPTION - PAIN TYPE: TYPE: ACUTE PAIN

## 2022-02-01 ASSESSMENT — PAIN SCALES - WONG BAKER: WONGBAKER_NUMERICALRESPONSE: 0

## 2022-02-01 ASSESSMENT — PAIN DESCRIPTION - DESCRIPTORS: DESCRIPTORS: TENDER

## 2022-02-01 ASSESSMENT — PAIN DESCRIPTION - LOCATION: LOCATION: FOOT

## 2022-02-01 ASSESSMENT — PAIN DESCRIPTION - ONSET: ONSET: ON-GOING

## 2022-02-01 ASSESSMENT — PAIN DESCRIPTION - FREQUENCY: FREQUENCY: INTERMITTENT

## 2022-02-01 ASSESSMENT — PAIN DESCRIPTION - ORIENTATION: ORIENTATION: LEFT

## 2022-02-01 ASSESSMENT — PAIN SCALES - GENERAL: PAINLEVEL_OUTOF10: 3

## 2022-02-01 ASSESSMENT — PAIN DESCRIPTION - PROGRESSION: CLINICAL_PROGRESSION: NOT CHANGED

## 2022-02-01 NOTE — PROGRESS NOTES
Multilayer Compression Wrap   (Not Unna) Below the Knee    NAME:  Debbie Velasco OF BIRTH:  1975  MEDICAL RECORD NUMBER:  [id number]  DATE:  2/1/2022    Multilayer compression wrap: Removed old Multilayer wrap if indicated and wash leg with mild soap/water. Applied moisturizing agent to dry skin as needed. Applied primary and secondary dressing as ordered. Applied multilayered dressing below the knee to left lower leg. Instructed patient/caregiver not to remove dressing and to keep it clean and dry. Instructed patient/caregiver on complications to report to provider, such as pain, numbness in toes, heavy drainage, and slippage of dressing. Instructed patient on purpose of compression dressing and on activity and exercise recommendations.       Electronically signed by Jana Sanchez RN on 2/1/2022 at 4:04 PM

## 2022-02-01 NOTE — PROGRESS NOTES
Wound Care Center Progress Note With Procedure    Ana Rodrigez  AGE: 55 y.o. GENDER: male  : 1975  EPISODE DATE:  2022     Subjective:     Chief Complaint   Patient presents with    Wound Check     LLE         HISTORY of PRESENT ILLNESS      Ana Rodrigez is a 55 y.o. male who presents today for wound evaluation of wound on the right foot. Status post second metatarsal head excision. Patient says that he was not able to make it to the appointment last week because of transportation issues. Says that he did get the x-rays I asked him to get. Denies any drainage or pain from the open wound on the top of his right foot. Denies any constitutional symptoms. PAST MEDICAL HISTORY        Diagnosis Date    Abscess     scrotal    Acute renal failure (ARF) ([address] 2019    Anxiety associated with depression     Anxiety associated with depression     Back pain 2012    Chest pain 2013    Diabetic nephropathy ([address]     Diabetic neuropathy ([address] 2011    Diabetic ulcer of left midfoot associated with type 2 diabetes mellitus, with fat layer exposed ([address] 2017    Diverticulosis     C scope + Dr. Micky Dominique DM (diabetes mellitus), type 2 ([address]     DR. 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 Micky Dominique Necrotizing fasciitis [facility])     Nose fracture     Panic attacks     Pericarditis     Hospitalized with s/p heart cath normal    Peripheral autonomic neuropathy due to diabetes mellitus ([address]     Axonal EMG- NCS, 2011    Sebaceous cyst 2011    URI (upper respiratory infection) 2012    WD-Wound, surgical, infected, initial encounter 2017    Wrist fracture 1986       PAST SURGICAL HISTORY    Past Surgical History:   Procedure Laterality Date    ABDOMEN SURGERY      CARDIAC CATHETERIZATION  2003, 2013    Normal (Dr. Gal Carl)    COLONOSCOPY  6/2/2011    Pandiverticulosis, Nonbleeding internal hemorrhoids, Repeat colonoscopy at age 48- Dr. Louise Mcfadden Left 12/21/2021    EXCISION OF HEAD LEFT 2ND METATARSAL performed by Ana Gutierrez DPM at [place] [address]    \"had reconstruction surgery on nose a year after the other nose surgery\"    OTHER SURGICAL HISTORY Right 05/22/2015    I & D right great toe with partial amputation    OTHER SURGICAL HISTORY  12/04/2017    inc and drainage of abcess    ID DEEP INCIS FOOT BONE INFECTN Left 9/22/2018    LEFT FOOT DEBRIDEMENT INCISION AND DRAINAGE TOP AND BOTTOM performed by Johnathan Merols DPM at [facility] N/A 5/15/2021    SCROTAL AND PERINEAL DEBRIDEMENT performed by Eloy Harmon MD at [address] [place] [address] 5/16/2021    SCROTAL RE-DEBRIDEMENT  INCISION AND DRAINAGE performed by Eloy Harmon MD at [address] [place] [address] [phone]    sebaceous cyst removal times 4     TOE AMPUTATION      right great toe    TOE AMPUTATION Right 3/23/2019    TOE AMPUTATION RIGHT 5TH TOE performed by Johnathan Merlos DPM at [place] [address] TOE AMPUTATION Right 8/6/2019    TOE AMPUTATION RIGHT 4TH TOE performed by Johnathan Merlos DPM at [address] [place] [address] UPPER GASTROINTESTINAL ENDOSCOPY  06/2/2011    Mild gastritis with moderatley severe antritis, small hiatal hernia, Dr. Lani Saleem N/A 1/12/2019    EGD BIOPSY performed by Jazmin Lopez MD at [facility]    Family History   Problem Relation Age of Onset    Cancer Mother         ?site    Stroke Mother     Bleeding Prob Mother     Diabetes Father     Heart Disease Father     High Blood Pressure Father     Obesity Father     Kidney Disease Father     Diabetes Sister     High Blood Pressure Sister     Mental Illness Sister     Obesity Sister     High Blood Pressure Other     Mental Illness Other         bipolar    Other Son         cyst in ear canal    ADHD Daughter        SOCIAL HISTORY    Social History     Tobacco Use    Smoking status: Current Some Day Smoker     Years: 20.00     Types: Cigarettes    Smokeless tobacco: Never Used    Tobacco comment: Smokes when drinking/social   Vaping Use    Vaping Use: Never used   Substance Use Topics    Alcohol use: Yes     Comment: occ    Drug use: Yes     Frequency: 7.0 times per week     Types: Marijuana [facility])     Comment: 12/20/21 @ 2000       ALLERGIES    Allergies   Allergen Reactions    Adhesive Tape Rash    Doxycycline Nausea And Vomiting    Reglan [Metoclopramide] Anxiety       MEDICATIONS    Current Outpatient Medications on File Prior to Encounter   Medication Sig Dispense Refill    apixaban (ELIQUIS) 2.5 MG TABS tablet Take 1 tablet by mouth 2 times daily 60 tablet 5    clopidogrel (PLAVIX) 75 MG tablet Take 1 tablet by mouth daily 30 tablet 3    furosemide (LASIX) 40 MG tablet Take 1 tablet by mouth daily 30 tablet 1    insulin glargine (LANTUS) 100 UNIT/ML injection vial Inject 75 Units into the skin nightly      insulin aspart (NOVOLOG RELION) 100 UNIT/ML injection vial Inject 35 Units into the skin 3 times daily (before meals)      atorvastatin (LIPITOR) 40 MG tablet Take 1 tablet by mouth nightly 30 tablet 3    metoprolol tartrate (LOPRESSOR) 25 MG tablet Take 1 tablet by mouth 2 times daily 60 tablet 3    amLODIPine (NORVASC) 10 MG tablet Take 1 tablet by mouth daily 30 tablet 3    chlorthalidone (HYGROTON) 25 MG tablet Take 1 tablet by mouth daily 30 tablet 3    sertraline (ZOLOFT) 50 MG tablet Take 2 tablets by mouth daily 30 tablet 3    aspirin 81 MG EC tablet Take 1 tablet by mouth daily 30 tablet 3    linagliptin (TRADJENTA) 5 MG tablet Take 1 tablet by mouth daily 30 tablet 5    ondansetron (ZOFRAN) 4 MG tablet Take 1 tablet by mouth every 8 hours as needed for Nausea or Vomiting 20 tablet 0    Lancets MISC 1 each by Does not apply route daily 100 each 3    glucose monitoring kit (FREESTYLE) monitoring kit 1 each by Does not apply route once for 1 dose. 1 kit 0     No current facility-administered medications on file prior to encounter. REVIEW OF SYSTEMS    Pertinent items are noted in HPI. Constitutional: Negative for systemic symptoms including fever, chills and malaise. Objective:      BP (!) 176/104   Pulse 99   Temp 97.7 °F (36.5 °C) (Temporal)   Resp 20     PHYSICAL EXAM    General: The patient is in no acute distress. Mental status:  Patient is appropriate, is  oriented to place and plan of care. Dermatologic exam: Visual inspection of the periwound reveals the skin to be well hydrated  Wound exam: see wound description below in procedure note      Assessment:     Problem List Items Addressed This Visit     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 type 2 diabetes mellitus, with muscle involvement without evidence of necrosis ([facility] - Primary    Relevant Orders    Initiate Outpatient Wound Care Protocol        Procedure Note    Indications:  Based on my examination of this patient's wound(s) today, sharp excision into necrotic subcutaneous tissue is required to promote healing and evaluate the extent of previous healing. Performed by: Rex Fleming DPM    Consent obtained: Yes    Time out taken:  Yes    Pain Control: Not needed       Debridement:Non-excisional Debridement    Using #15 blade scalpel the wound(s) was/were sharply debrided down through and including the removal of subcutaneous tissue.         Devitalized Tissue Debrided:  slough    Pre Debridement Measurements:  Are located in the Wound Documentation Flow Sheet    All active wounds listed below with today's date are evaluated  Wound(s)    debrided this date include # : 2     Post  Debridement Measurements:  Wound 12/08/17 #3 abdoment (onset 3 days ago) SURGICAL (Active)   Number of days: 7646       Wound 12/08/17 #4 Lt plantar (onset 6 months ago) DIABETIC DAVE 1 (Active)   Number of days: 4295       Wound 05/18/18 # 5 LEFT PLANTAR (ONSET 1 YEAR AGO) DM WAG 1 (Active)   Number of days: 5964       Wound 09/17/18 Left dorsal foot and 4th toe amp site 9/19/18 (Active)   Number of days: 1233       Incision 05/22/15 Foot Right (Active)   Number of days: 7415       Incision 12/04/17 Abdomen Mid (Active)   Number of days: 1104       Wound 01/11/19 left plantar foot wound (Active)   Number of days: 9491       Wound 03/21/19 Toe (Comment  which one) Anterior;Right (Active)   Number of days: 1048       Wound 03/21/19 Foot Left;Posterior hard callus area (Active)   Number of days: 5661       Wound 03/24/21 Left;Plantar (Active)   Number of days: 314       Wound 05/13/21 Left;Plantar (Active)   Number of days: 264       Wound 05/17/21 Groin Right scrotum extends to lower buttock (Active)   Number of days: 260       Wound 09/21/21 Foot Left;Plantar #1 (Active)   Wound Image   02/01/22 1528   Wound Etiology Diabetic Dave 2 02/01/22 1528   Dressing Status New dressing applied 02/01/22 1549   Wound Cleansed Soap and water 02/01/22 1528   Offloading for Diabetic Foot Ulcers Forefoot offloading shoe 02/01/22 1528   Wound Length (cm) 0 cm 02/01/22 1528   Wound Width (cm) 0 cm 02/01/22 1528   Wound Depth (cm) 0 cm 02/01/22 1528   Wound Surface Area (cm^2) 0 cm^2 02/01/22 1528   Change in Wound Size % (l*w) 100 02/01/22 1528   Wound Volume (cm^3) 0 cm^3 02/01/22 1528   Wound Healing % 100 02/01/22 1528   Post-Procedure Length (cm) 0.2 cm 01/18/22 1419   Post-Procedure Width (cm) 0.2 cm 01/18/22 1419   Post-Procedure Depth (cm) 0.1 cm 01/18/22 1419   Post-Procedure Surface Area (cm^2) 0.04 cm^2 01/18/22 1419   Post-Procedure Volume (cm^3) 0.004 cm^3 01/18/22 1419   Distance Tunneling (cm) 0 cm 02/01/22 1528   Tunneling Position ___ O'Clock 0 02/01/22 1528   Undermining Starts ___ O'Clock 0 02/01/22 1528   Undermining Ends___ O'Clock 0 02/01/22 1528   Undermining Maxium Distance (cm) 0 02/01/22 1528   Wound Assessment Dry 02/01/22 1528   Drainage Amount None 02/01/22 1528   Drainage Description Serosanguinous 02/01/22 1528   Odor None 02/01/22 1528   Shazia-wound Assessment Hyperkeratosis (callous) 02/01/22 1528   Margins Attached edges 02/01/22 1528   Wound Thickness Description not for Pressure Injury Full thickness 02/01/22 1528   Number of days: 133       Wound 12/28/21 Foot Anterior; Left #3 Left Dorsal Foot (Active)   Wound Image   02/01/22 1528   Wound Etiology Non-Healing Surgical 02/01/22 1528   Dressing Status New dressing applied 02/01/22 1549   Wound Cleansed Soap and water 02/01/22 1528   Offloading for Diabetic Foot Ulcers No offloading required 02/01/22 1528   Wound Length (cm) 0.7 cm 02/01/22 1528   Wound Width (cm) 0.1 cm 02/01/22 1528   Wound Depth (cm) 0.4 cm 02/01/22 1528   Wound Surface Area (cm^2) 0.07 cm^2 02/01/22 1528   Change in Wound Size % (l*w) 90.67 02/01/22 1528   Wound Volume (cm^3) 0.028 cm^3 02/01/22 1528   Wound Healing % 63 02/01/22 1528   Post-Procedure Length (cm) 0.7 cm 02/01/22 1537   Post-Procedure Width (cm) 0.1 cm 02/01/22 1537   Post-Procedure Depth (cm) 0.4 cm 02/01/22 1537   Post-Procedure Surface Area (cm^2) 0.07 cm^2 02/01/22 1537   Post-Procedure Volume (cm^3) 0.028 cm^3 02/01/22 1537   Distance Tunneling (cm) 0 cm 02/01/22 1528   Tunneling Position ___ O'Clock 0 02/01/22 1528   Undermining Starts ___ O'Clock 0 02/01/22 1528   Undermining Ends___ O'Clock 0 02/01/22 1528   Undermining Maxium Distance (cm) 0 02/01/22 1528   Wound Assessment Pink/red 02/01/22 1528   Drainage Amount Moderate 02/01/22 1528   Drainage Description Serosanguinous 02/01/22 1528   Odor None 02/01/22 1528   Shazia-wound Assessment Induration 02/01/22 1528   Margins Defined edges 02/01/22 1528   Wound Thickness Description not for Pressure Injury Full thickness 02/01/22 1528   Number of days: 35       Percent of Wound(s) Debrided: approximately 100%    Total  Area  Debrided:  1 sq cm     Bleeding:  None    Hemostasis Achieved:  not needed    Procedural Pain:  0  / 10     Post Procedural Pain:  0 / 10     Response to treatment:  Well tolerated by patient. Status of wound progress and description from last visit:   stable. Plan:       Discharge Instructions         Discharge Instructions        PHYSICIAN ORDERS AND DISCHARGE INSTRUCTIONS     NOTE: Upon discharge from the [address], you will receive a patient experience survey. We would be grateful if you would take the time to fill this survey out.     Wound care order history:                 BAIRON's   Right   1.38    Left  0.94           Date 9/21/2021              Vascular studies:   Date               Imaging: Marcene Pink ordered on 1/11/2022              Cultures:   Date obtained on 9/21/2021              Grafts:  Date               Antibiotics:               Earlier Wound care treatments:                          Primary care physician     Continuing wound care orders and information:              Residence:  Private               Continue home health care with: [facility]     Wound Medications:              LVRKK cleansing:                           SP not scrub or use excessive force.                          Wash hands with soap and water before and after dressing changes.                           Prior to applying a clean dressing, cleanse wound with normal saline,                                wound cleanser, or mild soap and water.                           Ask the physician or nurse before getting the wound(s) wet in a shower              Daily Wound management:                          Keep weight off wounds and reposition every 2 hours.                          OZBVX standing for long periods of time.                          TOAQV wraps/stockings in AM and remove at bedtime.                          If swelling is present, elevate legs to the level of the heart or above for 30 minutes 4-5 times a day and/or when sitting.                                             When taking antibiotics take entire prescription as ordered by physician do not stop taking until medicine is all gone.                                                          Orders for this week: 2022                   Left Plantar Foot -- wash with soap and water, pat dry. Foam to medial/anterior leg. Wrap with coban 2 leave in place for 1 week      Left dorsal incision site --- wash with soap and water,pat dry. Apply anasept and stimulin powder to wound bed and cover with calcium alginate. Wrap with coban 2 leave in place for 1 week.      Right leg---TUBI F         Rx: [facility] [employer]  Consult:  Central [phone]           Follow up with Dr. Milady Matute 1 week in the wound care center  Call [phone] for any questions or concerns. Date__________   Time____________            Treatment Note Wound 21 Foot Anterior; Left #3 Left Dorsal Foot-Dressing/Treatment:  (Anasept, Stimulen, ca alginate, Coban 2)  Wound 21 Foot Left;Plantar #1-Dressing/Treatment:  (Foam, Coban 2)    -Status post second metatarsal head excision right foot. Wound dehiscence at the dorsal aspect of the foot noted. All these are stable. -Monitor off antibiotics.  -Wound that was previously present subsecond tarsal it appears to be completely healed at this time.  -Wound dehiscence appears to be stable. Appears to be less  deep compared to previous assessment.  -Continue local wound care to help with the wound healing from inside out.  -Patient to continue in forefoot offloading shoe.  -Discussed that we will start diabetic shoe process for him once he is further along healing the dorsal aspect of the wound dehiscence.   -Importance of tight glycemic control.  -If any infection concerns develop he is to call the office or go to the nearest emergency room for further evaluation.  -Follow-up 1 week for recheck or sooner if needed.     Written Patient Dismissal Instructions Given            Electronically signed by Cece Quintero DPM on 2/1/2022 at 3:57 PM

## 2022-02-08 ENCOUNTER — HOSPITAL ENCOUNTER (OUTPATIENT)
Dept: WOUND CARE | Age: 47
Discharge: HOME | End: 2022-02-08
Payer: MEDICARE

## 2022-02-08 DIAGNOSIS — T81.31XA DEHISCENCE OF OPERATIVE WOUND, INITIAL ENCOUNTER: ICD-10-CM

## 2022-02-08 DIAGNOSIS — S90.32XA CONTUSION OF LEFT FOOT, INITIAL ENCOUNTER: Primary | ICD-10-CM

## 2022-02-08 DIAGNOSIS — E11.621 DIABETIC ULCER OF LEFT MIDFOOT ASSOCIATED WITH TYPE 2 DIABETES MELLITUS, WITH MUSCLE INVOLVEMENT WITHOUT EVIDENCE OF NECROSIS (HCC): ICD-10-CM

## 2022-02-08 DIAGNOSIS — L97.425 DIABETIC ULCER OF LEFT MIDFOOT ASSOCIATED WITH TYPE 2 DIABETES MELLITUS, WITH MUSCLE INVOLVEMENT WITHOUT EVIDENCE OF NECROSIS (HCC): ICD-10-CM

## 2022-02-08 PROCEDURE — 11042 DBRDMT SUBQ TIS 1ST 20SQCM/<: CPT

## 2022-02-08 RX ORDER — CLOBETASOL PROPIONATE 0.5 MG/G
OINTMENT TOPICAL ONCE
Status: CANCELLED | OUTPATIENT
Start: 2022-02-08 | End: 2022-02-08

## 2022-02-08 RX ORDER — GENTAMICIN SULFATE 1 MG/G
OINTMENT TOPICAL ONCE
Status: CANCELLED | OUTPATIENT
Start: 2022-02-08 | End: 2022-02-08

## 2022-02-08 RX ORDER — LIDOCAINE HYDROCHLORIDE 40 MG/ML
SOLUTION TOPICAL ONCE
Status: CANCELLED | OUTPATIENT
Start: 2022-02-08 | End: 2022-02-08

## 2022-02-08 RX ORDER — BACITRACIN, NEOMYCIN, POLYMYXIN B 400; 3.5; 5 [USP'U]/G; MG/G; [USP'U]/G
OINTMENT TOPICAL ONCE
Status: CANCELLED | OUTPATIENT
Start: 2022-02-08 | End: 2022-02-08

## 2022-02-08 RX ORDER — LIDOCAINE 40 MG/G
CREAM TOPICAL ONCE
Status: CANCELLED | OUTPATIENT
Start: 2022-02-08 | End: 2022-02-08

## 2022-02-08 RX ORDER — BACITRACIN ZINC AND POLYMYXIN B SULFATE 500; 1000 [USP'U]/G; [USP'U]/G
OINTMENT TOPICAL ONCE
Status: CANCELLED | OUTPATIENT
Start: 2022-02-08 | End: 2022-02-08

## 2022-02-08 RX ORDER — LIDOCAINE 50 MG/G
OINTMENT TOPICAL ONCE
Status: CANCELLED | OUTPATIENT
Start: 2022-02-08 | End: 2022-02-08

## 2022-02-08 RX ORDER — BETAMETHASONE DIPROPIONATE 0.05 %
OINTMENT (GRAM) TOPICAL ONCE
Status: CANCELLED | OUTPATIENT
Start: 2022-02-08 | End: 2022-02-08

## 2022-02-08 RX ORDER — GINSENG 100 MG
CAPSULE ORAL ONCE
Status: CANCELLED | OUTPATIENT
Start: 2022-02-08 | End: 2022-02-08

## 2022-02-08 NOTE — PROGRESS NOTES
Wound Care Center Progress Note With Procedure    Pratibha Zapien  AGE: 55 y.o. GENDER: male  : 1975  EPISODE DATE:  2022     Subjective:     Chief Complaint   Patient presents with    Wound Check     Left foot          HISTORY of PRESENT ILLNESS      Pratibha Zapien is a 55 y.o. male who presents today for wound evaluation of wound on the left foot. Says that he is not having any pain to the surgical site on the bottom of the foot at the surgical site. Says that on Saturday he was standing for long period of time and got some pain on the outside of his foot. Denies any direct trauma injury but still having pain to the area today. Does get pain when he moves his fifth toe. Denies any constitutional symptoms. PAST MEDICAL HISTORY        Diagnosis Date    2010    scrotal    Acute renal failure (ARF) ([address] 2019    Anxiety associated with depression     Anxiety associated with depression     Back pain 2012    Chest pain 2013    Diabetic nephropathy ([address]     Diabetic neuropathy ([address] 2011    Diabetic ulcer of left midfoot associated with type 2 diabetes mellitus, with fat layer exposed ([address] 2017    Diverticulosis     C scope + Dr. Rosario Jimenez DM (diabetes mellitus), type 2 ([address]     DR. 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 Rosario Jimenez Necrotizing fasciitis [facility])     Nose fracture     Panic attacks     Pericarditis     Hospitalized with s/p heart cath normal    Peripheral autonomic neuropathy due to diabetes mellitus ([address]     Axonal EMG- NCS, 2011    Sebaceous cyst 2011    URI (upper respiratory infection) 2012    WD-Wound, surgical, infected, initial encounter 2017    Wrist fracture 1986       PAST SURGICAL HISTORY    Past Surgical History:   Procedure Laterality Date    ABDOMEN SURGERY      CARDIAC CATHETERIZATION  2003, 2013    Normal (Dr. Andra Dominguez)    COLONOSCOPY  6/2/2011    Pandiverticulosis, Nonbleeding internal hemorrhoids, Repeat colonoscopy at age 48- Dr. Eliud Okeefe Left 12/21/2021    EXCISION OF HEAD LEFT 2ND METATARSAL performed by Bette Coleman DPM at [place] [address]    \"had reconstruction surgery on nose a year after the other nose surgery\"    OTHER SURGICAL HISTORY Right 05/22/2015    I & D right great toe with partial amputation    OTHER SURGICAL HISTORY  12/04/2017    inc and drainage of abcess    VA DEEP INCIS FOOT BONE INFECTN Left 9/22/2018    LEFT FOOT DEBRIDEMENT INCISION AND DRAINAGE TOP AND BOTTOM performed by Kristi Bhatti DPM at [facility] N/A 5/15/2021    SCROTAL AND PERINEAL DEBRIDEMENT performed by Cherry Alves MD at [address] [place] [address] 5/16/2021    SCROTAL RE-DEBRIDEMENT  INCISION AND DRAINAGE performed by Cherry Alves MD at [address] [place] [address]    sebaceous cyst removal times 4     TOE AMPUTATION      right great toe    TOE AMPUTATION Right 3/23/2019    TOE AMPUTATION RIGHT 5TH TOE performed by Kristi Bhatti DPM at [place] [address] TOE AMPUTATION Right 8/6/2019    TOE AMPUTATION RIGHT 4TH TOE performed by Kristi Bhatti DPM at [address] [place] [address] UPPER GASTROINTESTINAL ENDOSCOPY  06/2/2011    Mild gastritis with moderatley severe antritis, small hiatal hernia, Dr. Angelica Avila N/A 1/12/2019    EGD BIOPSY performed by Kofi Rhoades MD at [place] [address] History   Problem Relation Age of Onset    Cancer Mother         ?site    Stroke Mother     Bleeding Prob Mother     Diabetes Father     Heart Disease Father     High Blood Pressure Father     Obesity Father     Kidney Disease Father     Diabetes Sister     High Blood Pressure Sister     Mental Illness Sister     Obesity Sister     High Blood Pressure Other     Mental Illness Other         bipolar    Other Son         cyst in ear canal    ADHD Daughter        SOCIAL HISTORY    Social History     Tobacco Use    Smoking status: Current Some Day Smoker     Years: 20.00     Types: Cigarettes    Smokeless tobacco: Never Used    Tobacco comment: Smokes when drinking/social   Vaping Use    Vaping Use: Never used   Substance Use Topics    Alcohol use: Yes     Comment: occ    Drug use: Yes     Frequency: 7.0 times per week     Types: Marijuana Rocio Yovannyeda)     Comment: 12/20/21 @ 2000       ALLERGIES    Allergies   Allergen Reactions    Adhesive Tape Rash    Doxycycline Nausea And Vomiting    Reglan [Metoclopramide] Anxiety       MEDICATIONS    Current Outpatient Medications on File Prior to Encounter   Medication Sig Dispense Refill    apixaban (ELIQUIS) 2.5 MG TABS tablet Take 1 tablet by mouth 2 times daily 60 tablet 5    clopidogrel (PLAVIX) 75 MG tablet Take 1 tablet by mouth daily 30 tablet 3    furosemide (LASIX) 40 MG tablet Take 1 tablet by mouth daily 30 tablet 1    insulin glargine (LANTUS) 100 UNIT/ML injection vial Inject 75 Units into the skin nightly      insulin aspart (NOVOLOG RELION) 100 UNIT/ML injection vial Inject 35 Units into the skin 3 times daily (before meals)      atorvastatin (LIPITOR) 40 MG tablet Take 1 tablet by mouth nightly 30 tablet 3    metoprolol tartrate (LOPRESSOR) 25 MG tablet Take 1 tablet by mouth 2 times daily 60 tablet 3    amLODIPine (NORVASC) 10 MG tablet Take 1 tablet by mouth daily 30 tablet 3    chlorthalidone (HYGROTON) 25 MG tablet Take 1 tablet by mouth daily 30 tablet 3    sertraline (ZOLOFT) 50 MG tablet Take 2 tablets by mouth daily 30 tablet 3    aspirin 81 MG EC tablet Take 1 tablet by mouth daily 30 tablet 3    linagliptin (TRADJENTA) 5 MG tablet Take 1 tablet by mouth daily 30 tablet 5    ondansetron (ZOFRAN) 4 MG tablet Take 1 tablet by mouth every 8 hours as needed for Nausea or Vomiting 20 tablet 0    Lancets MISC 1 each by Does not apply route daily 100 each 3    glucose monitoring kit (FREESTYLE) monitoring kit 1 each by Does not apply route once for 1 dose. 1 kit 0     No current facility-administered medications on file prior to encounter. REVIEW OF SYSTEMS    Pertinent items are noted in HPI. Constitutional: Negative for systemic symptoms including fever, chills and malaise. Objective: There were no vitals taken for this visit. PHYSICAL EXAM    General: The patient is in no acute distress. Mental status:  Patient is appropriate, is  oriented to place and plan of care. Dermatologic exam: Visual inspection of the periwound reveals the skin to be normal in turgor and texture  Wound exam: see wound description below in procedure note      Assessment:     Problem List Items Addressed This Visit     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 type 2 diabetes mellitus, with muscle involvement without evidence of necrosis ([facility]    Relevant Orders    Initiate Outpatient Wound Care Protocol      Other Visit Diagnoses     Contusion of left foot, initial encounter    -  Primary    Relevant Orders    XR FOOT LEFT (MIN 3 VIEWS)        Procedure Note    Indications:  Based on my examination of this patient's wound(s) today, sharp excision into necrotic subcutaneous tissue is required to promote healing and evaluate the extent of previous healing. Performed by: Vinnie Jalloh DPM    Consent obtained: Yes    Time out taken:  Yes    Pain Control: None needed       Debridement:Non-excisional Debridement    Using #15 blade scalpel the wound(s) was/were sharply debrided down through and including the removal of subcutaneous tissue.         Devitalized Tissue Debrided:  slough    Pre Debridement Measurements:  Are located in the Wound Documentation Flow Sheet    All active wounds listed below with today's date are evaluated  Wound(s)    debrided this date include # : 1     Post  Debridement Measurements:  Wound 12/08/17 #3 abdoment (onset 3 days ago) SURGICAL (Active)   Number of days: 1523       Wound 12/08/17 #4 Lt plantar (onset 6 months ago) DIABETIC ALCALA 1 (Active)   Number of days: 1523       Wound 05/18/18 # 5 LEFT PLANTAR (ONSET 1 YEAR AGO) DM WAG 1 (Active)   Number of days: 6567       Wound 09/17/18 Left dorsal foot and 4th toe amp site 9/19/18 (Active)   Number of days: 1240       Incision 05/22/15 Foot Right (Active)   Number of days: 2454       Incision 12/04/17 Abdomen Mid (Active)   Number of days: 1526       Wound 01/11/19 left plantar foot wound (Active)   Number of days: 1124       Wound 03/21/19 Toe (Comment  which one) Anterior;Right (Active)   Number of days: 1055       Wound 03/21/19 Foot Left;Posterior hard callus area (Active)   Number of days: 1055       Wound 03/24/21 Left;Plantar (Active)   Number of days: 321       Wound 05/13/21 Left;Plantar (Active)   Number of days: 271       Wound 05/17/21 Groin Right scrotum extends to lower buttock (Active)   Number of days: 267       Wound 12/28/21 Foot Anterior; Left #3 Left Dorsal Foot (Active)   Wound Image   02/01/22 1528   Wound Etiology Non-Healing Surgical 02/08/22 1524   Dressing Status New dressing applied 02/08/22 1524   Wound Cleansed Soap and water 02/01/22 1528   Offloading for Diabetic Foot Ulcers Forefoot offloading shoe 02/08/22 1524   Wound Length (cm) 0.6 cm 02/08/22 1524   Wound Width (cm) 0.2 cm 02/08/22 1524   Wound Depth (cm) 0.4 cm 02/08/22 1524   Wound Surface Area (cm^2) 0.12 cm^2 02/08/22 1524   Change in Wound Size % (l*w) 84 02/08/22 1524   Wound Volume (cm^3) 0.048 cm^3 02/08/22 1524   Wound Healing % 36 02/08/22 1524   Post-Procedure Length (cm) 0.6 cm 02/08/22 1551   Post-Procedure Width (cm) 0.2 cm 02/08/22 1551   Post-Procedure Depth (cm) 0.4 cm 02/08/22 1551   Post-Procedure Surface Area (cm^2) 0.12 cm^2 02/08/22 1551   Post-Procedure Volume (cm^3) 0.048 cm^3 02/08/22 1551   Distance Tunneling (cm) 0 cm 02/08/22 1524   Tunneling Position ___ O'Clock 0 02/08/22 1524   Undermining Starts ___ O'Clock 0 02/08/22 1524   Undermining Ends___ O'Clock 0 02/08/22 1524   Undermining Maxium Distance (cm) 0 02/08/22 1524   Wound Assessment Pink/red 02/08/22 1524   Drainage Amount Moderate 02/08/22 1524   Drainage Description Serosanguinous 02/08/22 1524   Odor None 02/08/22 1524   Shazia-wound Assessment Induration 02/08/22 1524   Margins Defined edges 02/08/22 1524   Wound Thickness Description not for Pressure Injury Full thickness 02/08/22 1524   Number of days: 42       Percent of Wound(s) Debrided: approximately 100%    Total  Area  Debrided:  1 sq cm     Bleeding:  Minimal    Hemostasis Achieved:  by pressure    Procedural Pain:  0  / 10     Post Procedural Pain:  0 / 10     Response to treatment:  Well tolerated by patient. Status of wound progress and description from last visit:   stable. Plan:       Discharge Instructions         Discharge Instructions        PHYSICIAN ORDERS AND DISCHARGE INSTRUCTIONS     NOTE: Upon discharge from the [address], you will receive a patient experience survey.  We would be grateful if you would take the time to fill this survey out.     Wound care order history:                 BAIRON's   Right   1.38    Left  0.94           Date 9/21/2021              Vascular studies:   Date               Imaging: Jesus Ashleighkamari ordered on 1/11/2022; 2/8/2022              Cultures:   Date obtained on 9/21/2021              Grafts:  Date               Antibiotics:               Earlier Wound care treatments:                          Primary care physician     Continuing wound care orders and information:              Residence:  Private               [facility]: [facility]     Wound Medications:              BFTPB cleansing:                         TP not scrub or use excessive force.                          Wash hands with soap and water before and after dressing changes.                         Prior to applying a clean dressing, cleanse wound with normal saline,                                wound cleanser, or mild soap and water.                           Ask the physician or nurse before getting the wound(s) wet in a shower              Daily Wound management:                          Keep weight off wounds and reposition every 2 hours.                          BHMPN standing for long periods of time.                          HTNOJ wraps/stockings in AM and remove at bedtime.                          If swelling is present, elevate legs to the level of the heart or above for 30 minutes 4-5 times a day and/or when sitting.                                             When taking antibiotics take entire prescription as ordered by physician do not stop taking until medicine is all gone.                                                          Orders for this week: 2022               Auth for graft 2022        Left Plantar Foot -- healed      Left dorsal incision site --- wash with soap and water,pat dry. Apply anasept gel  and stimulin powder to wound bed and cover with calcium alginate, ABD and wrap with conform and tape. Change daily  Tubi F     Right leg---TUBI F         Rx: [facility]  Consult:  Central [phone]           Follow up with Dr. Brea Ibarra 1 week in the wound care center  Call  for any questions or concerns. Date__________   Time____________               Treatment Note     -Surgical dehiscence site dorsal aspect over the second metatarsal phalangeal joint. Doing well with no signs of infection. -Monitor off antibiotics.   -Dressing supplies obtained from patient so he can apply collagen to the area and change it daily to see if this will speed up wound healing.  -Did apply for skin substitutes today.  -Left foot x-ray ordered today given the pain that patient is having to the left foot. No direct trauma to the area.  Possible bone contusion versus rule out stress fracture.  -Continue to offload left foot and offloading surgical shoe.  -Follow-up 1 week for recheck or sooner if needed    Written Patient Dismissal Instructions Given            Electronically signed by Ana Gutierrez DPM on 2/8/2022 at 4:13 PM

## 2022-02-15 ENCOUNTER — HOSPITAL ENCOUNTER (OUTPATIENT)
Dept: WOUND CARE | Age: 47
Discharge: HOME | End: 2022-02-15

## 2022-02-17 ENCOUNTER — APPOINTMENT (OUTPATIENT)
Dept: GENERAL RADIOLOGY | Age: 47
End: 2022-02-17
Payer: MEDICARE

## 2022-02-17 ENCOUNTER — HOSPITAL ENCOUNTER (EMERGENCY)
Age: 47
Discharge: HOME | End: 2022-02-17
Payer: MEDICARE

## 2022-02-17 VITALS
SYSTOLIC BLOOD PRESSURE: 139 MMHG | DIASTOLIC BLOOD PRESSURE: 96 MMHG | BODY MASS INDEX: 41.47 KG/M2 | TEMPERATURE: 98.7 F | HEART RATE: 88 BPM | WEIGHT: 280 LBS | RESPIRATION RATE: 16 BRPM | HEIGHT: 69 IN | OXYGEN SATURATION: 100 %

## 2022-02-17 DIAGNOSIS — R07.81 RIB PAIN ON RIGHT SIDE: Primary | ICD-10-CM

## 2022-02-17 DIAGNOSIS — M79.605 LEFT LEG PAIN: ICD-10-CM

## 2022-02-17 PROCEDURE — 93005 ELECTROCARDIOGRAM TRACING: CPT | Performed by: PHYSICIAN ASSISTANT

## 2022-02-17 PROCEDURE — 73630 X-RAY EXAM OF FOOT: CPT

## 2022-02-17 PROCEDURE — 99283 EMERGENCY DEPT VISIT LOW MDM: CPT

## 2022-02-17 PROCEDURE — 71101 X-RAY EXAM UNILAT RIBS/CHEST: CPT

## 2022-02-17 PROCEDURE — 73590 X-RAY EXAM OF LOWER LEG: CPT

## 2022-02-17 PROCEDURE — 73552 X-RAY EXAM OF FEMUR 2/>: CPT

## 2022-02-17 NOTE — ED NOTES
Discussed discharge instructions with patient. All questions answered. Patient verbalized understanding.           Jerad Goddard RN  02/17/22 [id number]

## 2022-02-17 NOTE — ED PROVIDER NOTES
Patient Identification  Barrington Mireles is a 55 y.o. male    Chief Complaint  Fall, Hip Pain (left radiating down leg), and Chest Pain (right upper ribs.)      HPI  (History provided by patient)  This is a 55 y.o. male who was brought in by self for chief complaint of R rib and diffuse left leg pain after fall yesterday. States slipped on ice. Pain is moderate, worst in R ribs. Also notes pain diffusely from left hip to left foot. Denies head injury, neck or back pain. Patient currently on Eliquis, aspirin, Plavix. REVIEW OF SYSTEMS    10 systems reviewed negative except as noted above. See HPI and nursing notes for additional information     I have reviewed the following nursing documentation:  Allergies: Allergies   Allergen Reactions    Adhesive Tape Rash    Doxycycline Nausea And Vomiting    Reglan [Metoclopramide] Anxiety       Past medical history:  has a past medical history of Abscess (2010), Acute renal failure (ARF) ([address] (08/04/2019), Anxiety associated with depression, Anxiety associated with depression, Back pain (07/02/2012), Chest pain (05/01/2013), Diabetic nephropathy ([address] Diabetic neuropathy ([address] (12/22/2011), Diabetic ulcer of left midfoot associated with type 2 diabetes mellitus, with fat layer exposed ([address] (07/18/2017), Diverticulosis, DM (diabetes mellitus), type 2 ([address] (2002), 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 Past surgical history:  has a past surgical history that includes Cardiac catheterization (2003, 2013);  Tonsillectomy and adenoidectomy (1988); Upper gastrointestinal endoscopy (06/2/2011); Colonoscopy (6/2/2011); Nose surgery (1993); skin biopsy (N/A, [id number]); other surgical history (Right, 05/22/2015); Toe amputation; Abdomen surgery; other surgical history (12/04/2017); pr deep incis foot bone infectn (Left, 9/22/2018); Upper gastrointestinal endoscopy (N/A, 1/12/2019); Toe amputation (Right, 3/23/2019); Toe amputation (Right, 8/6/2019); Scrotal surgery (N/A, 5/15/2021); Scrotal surgery (N/A, 5/16/2021); and Foot surgery (Left, 12/21/2021). Home medications:   Prior to Admission medications    Medication Sig Start Date End Date Taking?  Authorizing Provider   apixaban (ELIQUIS) 2.5 MG TABS tablet Take 1 tablet by mouth 2 times daily 10/1/21   APPLE Marshall CNP   clopidogrel (PLAVIX) 75 MG tablet Take 1 tablet by mouth daily 10/2/21   APPLE Marshall CNP   furosemide (LASIX) 40 MG tablet Take 1 tablet by mouth daily 9/22/21 10/22/21  Bertha Forrest MD   insulin glargine (LANTUS) 100 UNIT/ML injection vial Inject 75 Units into the skin nightly    Historical Provider, MD   insulin aspart (NOVOLOG RELION) 100 UNIT/ML injection vial Inject 35 Units into the skin 3 times daily (before meals)    Historical Provider, MD   atorvastatin (LIPITOR) 40 MG tablet Take 1 tablet by mouth nightly 6/15/21   Kelsy Whittington MD   metoprolol tartrate (LOPRESSOR) 25 MG tablet Take 1 tablet by mouth 2 times daily 6/15/21   Kelsy Whittington MD   amLODIPine (NORVASC) 10 MG tablet Take 1 tablet by mouth daily 6/15/21   Kelsy Whittington MD   chlorthalidone (HYGROTON) 25 MG tablet Take 1 tablet by mouth daily 6/15/21   Kelsy Whittington MD   sertraline (ZOLOFT) 50 MG tablet Take 2 tablets by mouth daily 5/29/21   Kelsy Whittington MD   aspirin 81 MG EC tablet Take 1 tablet by mouth daily 4/5/21   Fredy Richardson MD   linagliptin (TRADJENTA) 5 MG tablet Take 1 tablet by mouth daily 3/29/21   Kelsy Whittington MD   ondansetron (ZOFRAN) 4 MG tablet Take 1 tablet by mouth every 8 hours as needed for Nausea or Vomiting 9/23/18   Luis Trinidad MD   Lancets MISC 1 each by Does not apply route daily 9/21/18   Luis Trinidad MD   glucose monitoring kit (FREESTYLE) monitoring kit 1 each by Does not apply route once for 1 dose. 6/20/13 6/20/13  William Sousa MD       Social history:  reports that he has been smoking cigarettes. He has smoked for the past 20.00 years. He has never used smokeless tobacco. He reports current alcohol use. He reports current drug use. Frequency: 7.00 times per week. Drug: Marijuana Griffin Free). Family history:    Family History   Problem Relation Age of Onset   Fabby Perez Cancer Mother         ?site   Fabby Perez Stroke Mother     Bleeding Prob Mother     Diabetes Father     Heart Disease Father     High Blood Pressure Father     Obesity Father     Kidney Disease Father     Diabetes Sister     High Blood Pressure Sister     Mental Illness Sister     Obesity Sister     High Blood Pressure Other     Mental Illness Other         bipolar    Other Son         cyst in ear canal    ADHD Daughter          Exam  BP (!) 139/96   Pulse 88   Temp 98.7 °F (37.1 °C) (Oral)   Resp 16   Ht 5' 9\" (1.753 m)   Wt 280 lb (127 kg)   SpO2 100%   BMI 41.35 kg/m²   Nursing note and vitals reviewed. Constitutional: Well developed, well nourished. No acute distress. HENT:      Head: Normocephalic and atraumatic. Ears: External ears normal.      Nose: Nose normal.     Mouth: Membrane mucosa moist and pink. No posterior oropharynx erythema or tonsillar edema  Eyes: Anicteric sclera. No discharge, PERRL  Neck: Supple. Trachea midline. Cardiovascular: RRR, no murmurs, rubs, or gallops, radial pulses 2+ bilaterally. Pulmonary/Chest: Effort normal. No respiratory distress. CTAB. No stridor. No wheezes. No rales. Abdominal: Soft. Nontender to palpation. No distension.   No guarding, rebound tenderness, or evidence of ascites. : No CVA tenderness. Musculoskeletal: Moves all extremities. Patient has partial amputation of the left foot. There is mild diffuse tenderness to palpation of the left leg without swelling or deformity. There is tenderness diffusely to the right lateral ribs without step-off or deformity or flail chest.  Neurological: Alert and oriented to person, place, and time. Normal muscle tone. Skin: Warm and dry. No rash. Psychiatric: Normal mood and affect. Behavior is normal.      Radiographs (if obtained):  [] The following radiograph was interpreted by myself in the absence of a radiologist:   [x] Radiologist's Report Reviewed:  XR FOOT LEFT (MIN 3 VIEWS)   Final Result      Left femur: No fracture. Left tibia and fibula: No fracture      Left foot: Stable changes related to amputation of 4th digit and distal   aspect of 2nd metatarsal.  No acute fracture. Chest radiograph and rib series: No rib fracture is detected. No acute   intrathoracic pathology         XR TIBIA FIBULA LEFT (2 VIEWS)   Final Result      Left femur: No fracture. Left tibia and fibula: No fracture      Left foot: Stable changes related to amputation of 4th digit and distal   aspect of 2nd metatarsal.  No acute fracture. Chest radiograph and rib series: No rib fracture is detected. No acute   intrathoracic pathology         XR FEMUR LEFT (MIN 2 VIEWS)   Final Result      Left femur: No fracture. Left tibia and fibula: No fracture      Left foot: Stable changes related to amputation of 4th digit and distal   aspect of 2nd metatarsal.  No acute fracture. Chest radiograph and rib series: No rib fracture is detected. No acute   intrathoracic pathology         XR RIBS RIGHT INCLUDE CHEST (MIN 3 VIEWS)   Final Result      Left femur: No fracture.       Left tibia and fibula: No fracture      Left foot: Stable changes related to amputation of 4th digit and distal   aspect of 2nd metatarsal.  No acute fracture. Chest radiograph and rib series: No rib fracture is detected. No acute   intrathoracic pathology                Labs  No results found for this visit on 02/17/22. MDM  Patient presents for mechanical fall, pain in right ribs and left leg. Imaging of all areas of pain reveals no acute findings. Discussed results with patient. He appears comfortable. Appropriate for outpatient follow-up. Discussed use of NSAIDs and Tylenol. Return to ED for any new or worsening symptoms. Follow-up with PCP in 2 to 3 days for recheck if not improving. I have independently evaluated this patient. Final Impression  1. Rib pain on right side    2. Left leg pain        Blood pressure (!) 139/96, pulse 88, temperature 98.7 °F (37.1 °C), temperature source Oral, resp. rate 16, height 5' 9\" (1.753 m), weight 280 lb (127 kg), SpO2 100 %. Disposition:  Discharge to home in stable condition. Patient was given scripts for the following medications. I counseled patient how to take these medications. Discharge Medication List as of 2/17/2022  6:09 PM          This chart was generated using the [employer] dictation system. I created this record but it may contain dictation errors given the limitations of this technology.          Chanel Gallo PA-C  02/17/22 [id number]

## 2022-02-18 LAB
EKG ATRIAL RATE: 89 BPM
EKG DIAGNOSIS: NORMAL
EKG P AXIS: 24 DEGREES
EKG P-R INTERVAL: 176 MS
EKG Q-T INTERVAL: 352 MS
EKG QRS DURATION: 80 MS
EKG QTC CALCULATION (BAZETT): 428 MS
EKG R AXIS: 20 DEGREES
EKG T AXIS: 70 DEGREES
EKG VENTRICULAR RATE: 89 BPM

## 2022-02-18 PROCEDURE — 93010 ELECTROCARDIOGRAM REPORT: CPT | Performed by: INTERNAL MEDICINE

## 2022-02-18 NOTE — ED PROVIDER NOTES
ED attending EKG interpretation (I otherwise did not participate in the care of this patient)    EKG Interpretation  Interpreted by me  Compared to 12/21/2021  Rhythm: normal sinus   Rate: normal 89  Axis: normal  Ectopy: none  Conduction: normal  ST Segments: no acute change  T Waves: no acute change  Clinical Impression: no acute changes and normal EKG     Tanisha Chaves MD  02/17/22 4544

## 2022-02-22 ENCOUNTER — HOSPITAL ENCOUNTER (OUTPATIENT)
Dept: WOUND CARE | Age: 47
Discharge: HOME | End: 2022-02-22
Payer: MEDICARE

## 2022-02-22 VITALS
HEART RATE: 90 BPM | DIASTOLIC BLOOD PRESSURE: 99 MMHG | TEMPERATURE: 98.9 F | SYSTOLIC BLOOD PRESSURE: 190 MMHG | RESPIRATION RATE: 18 BRPM

## 2022-02-22 DIAGNOSIS — E11.621 DIABETIC ULCER OF LEFT MIDFOOT ASSOCIATED WITH TYPE 2 DIABETES MELLITUS, WITH MUSCLE INVOLVEMENT WITHOUT EVIDENCE OF NECROSIS (HCC): Primary | ICD-10-CM

## 2022-02-22 DIAGNOSIS — L97.425 DIABETIC ULCER OF LEFT MIDFOOT ASSOCIATED WITH TYPE 2 DIABETES MELLITUS, WITH MUSCLE INVOLVEMENT WITHOUT EVIDENCE OF NECROSIS (HCC): Primary | ICD-10-CM

## 2022-02-22 PROCEDURE — 11042 DBRDMT SUBQ TIS 1ST 20SQCM/<: CPT

## 2022-02-22 RX ORDER — BETAMETHASONE DIPROPIONATE 0.05 %
OINTMENT (GRAM) TOPICAL ONCE
Status: CANCELLED | OUTPATIENT
Start: 2022-02-22 | End: 2022-02-22

## 2022-02-22 RX ORDER — CLOBETASOL PROPIONATE 0.5 MG/G
OINTMENT TOPICAL ONCE
Status: CANCELLED | OUTPATIENT
Start: 2022-02-22 | End: 2022-02-22

## 2022-02-22 RX ORDER — LIDOCAINE 40 MG/G
CREAM TOPICAL ONCE
Status: CANCELLED | OUTPATIENT
Start: 2022-02-22 | End: 2022-02-22

## 2022-02-22 RX ORDER — LIDOCAINE HYDROCHLORIDE 40 MG/ML
SOLUTION TOPICAL ONCE
Status: CANCELLED | OUTPATIENT
Start: 2022-02-22 | End: 2022-02-22

## 2022-02-22 RX ORDER — BACITRACIN, NEOMYCIN, POLYMYXIN B 400; 3.5; 5 [USP'U]/G; MG/G; [USP'U]/G
OINTMENT TOPICAL ONCE
Status: CANCELLED | OUTPATIENT
Start: 2022-02-22 | End: 2022-02-22

## 2022-02-22 RX ORDER — GINSENG 100 MG
CAPSULE ORAL ONCE
Status: CANCELLED | OUTPATIENT
Start: 2022-02-22 | End: 2022-02-22

## 2022-02-22 RX ORDER — LIDOCAINE 50 MG/G
OINTMENT TOPICAL ONCE
Status: CANCELLED | OUTPATIENT
Start: 2022-02-22 | End: 2022-02-22

## 2022-02-22 RX ORDER — GENTAMICIN SULFATE 1 MG/G
OINTMENT TOPICAL ONCE
Status: CANCELLED | OUTPATIENT
Start: 2022-02-22 | End: 2022-02-22

## 2022-02-22 RX ORDER — BACITRACIN ZINC AND POLYMYXIN B SULFATE 500; 1000 [USP'U]/G; [USP'U]/G
OINTMENT TOPICAL ONCE
Status: CANCELLED | OUTPATIENT
Start: 2022-02-22 | End: 2022-02-22

## 2022-02-22 ASSESSMENT — PAIN SCALES - GENERAL: PAINLEVEL_OUTOF10: 6

## 2022-02-22 ASSESSMENT — PAIN DESCRIPTION - ORIENTATION: ORIENTATION: LEFT

## 2022-02-22 ASSESSMENT — PAIN DESCRIPTION - PAIN TYPE: TYPE: ACUTE PAIN

## 2022-02-22 ASSESSMENT — PAIN DESCRIPTION - LOCATION: LOCATION: FOOT

## 2022-02-22 NOTE — PROGRESS NOTES
Wound Care Center Progress Note With Procedure    Maria E Sotelo  AGE: 55 y.o. GENDER: male  : 1975  EPISODE DATE:  2022     Subjective:     Chief Complaint   Patient presents with    Wound Check     left foot          HISTORY of PRESENT ILLNESS      Maria E Sotelo is a 55 y.o. male who presents today for wound evaluation of surgical wound dehiscence over second metatarsal head area right foot. Patient says that he is doing well overall. Did not come to his last appointment. Says that recently he was in the hospital for a fall and injury. Denies any drainage from the right foot. Denies any constitutional symptoms. PAST MEDICAL HISTORY        Diagnosis Date    Abscess     scrotal    Acute renal failure (ARF) ([address] 2019    Anxiety associated with depression     Anxiety associated with depression     Back pain 2012    Chest pain 2013    Diabetic nephropathy ([address]     Diabetic neuropathy ([address] 2011    Diabetic ulcer of left midfoot associated with type 2 diabetes mellitus, with fat layer exposed ([address] 2017    Diverticulosis     C scope + Dr. Kimberlee Johnson DM (diabetes mellitus), type 2 ([address]     DR. 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 Kimberlee Johnson Necrotizing fasciitis [facility])     Nose fracture     Panic attacks     Pericarditis     Hospitalized with s/p heart cath normal    Peripheral autonomic neuropathy due to diabetes mellitus ([address]     Axonal EMG- [facility], 2011    Sebaceous cyst 2011    URI (upper respiratory infection) 2012    WD-Wound, surgical, infected, initial encounter 2017    Wrist fracture        PAST SURGICAL HISTORY    Past Surgical History:   Procedure Laterality Date [address] CARDIAC CATHETERIZATION  2003, 2013    Normal (Dr. Beata Jarrett)    COLONOSCOPY  6/2/2011    Pandiverticulosis, Nonbleeding internal hemorrhoids, Repeat colonoscopy at age 48- Dr. Luis Portillo Left 12/21/2021    EXCISION OF HEAD LEFT 2ND METATARSAL performed by Kayla Brown DPM at [place] [address]    \"had reconstruction surgery on nose a year after the other nose surgery\"    OTHER SURGICAL HISTORY Right 05/22/2015    I & D right great toe with partial amputation    OTHER SURGICAL HISTORY  12/04/2017    inc and drainage of abcess    ME DEEP INCIS FOOT BONE INFECTN Left 9/22/2018    LEFT FOOT DEBRIDEMENT INCISION AND DRAINAGE TOP AND BOTTOM performed by Susannah Zaidi DPM at [facility] N/A 5/15/2021    SCROTAL AND PERINEAL DEBRIDEMENT performed by Casper Angeles MD at [address] [place] [address] 5/16/2021    SCROTAL RE-DEBRIDEMENT  INCISION AND DRAINAGE performed by Casper Angeles MD at [address] [place] [address]    sebaceous cyst removal times 4     TOE AMPUTATION      right great toe    TOE AMPUTATION Right 3/23/2019    TOE AMPUTATION RIGHT 5TH TOE performed by Susannah Zaidi DPM at [place] [address] TOE AMPUTATION Right 8/6/2019    TOE AMPUTATION RIGHT 4TH TOE performed by Susannah Zaidi DPM at [address] [place] [address] UPPER GASTROINTESTINAL ENDOSCOPY  06/2/2011    Mild gastritis with moderatley severe antritis, small hiatal hernia, Dr. Tulio Bansal N/A 1/12/2019    EGD BIOPSY performed by Wendy Nuñez MD at [place] [address] History   Problem Relation Age of Onset    Cancer Mother         ?site    Stroke Mother     Bleeding Prob Mother     Diabetes Father     Heart Disease Father     High Blood Pressure Father     Obesity Father     Kidney Disease Father     Diabetes Sister     High Blood Pressure Sister     Mental Illness Sister    Olivier Obesity Sister     High Blood Pressure Other     Mental Illness Other         bipolar    Other Son         cyst in ear canal    ADHD Daughter        SOCIAL HISTORY    Social History     Tobacco Use    Smoking status: Current Some Day Smoker     Years: 20.00     Types: Cigarettes    Smokeless tobacco: Never Used    Tobacco comment: Smokes when drinking/social   Vaping Use    Vaping Use: Never used   Substance Use Topics    Alcohol use: Yes     Comment: occ    Drug use: Yes     Frequency: 7.0 times per week     Types: Marijuana Gelene Chasten)     Comment: 12/20/21 @ 2000       ALLERGIES    Allergies   Allergen Reactions    Adhesive Tape Rash    Doxycycline Nausea And Vomiting    Reglan [Metoclopramide] Anxiety       MEDICATIONS    Current Outpatient Medications on File Prior to Encounter   Medication Sig Dispense Refill    apixaban (ELIQUIS) 2.5 MG TABS tablet Take 1 tablet by mouth 2 times daily 60 tablet 5    clopidogrel (PLAVIX) 75 MG tablet Take 1 tablet by mouth daily 30 tablet 3    insulin glargine (LANTUS) 100 UNIT/ML injection vial Inject 75 Units into the skin nightly      insulin aspart (NOVOLOG RELION) 100 UNIT/ML injection vial Inject 35 Units into the skin 3 times daily (before meals)      atorvastatin (LIPITOR) 40 MG tablet Take 1 tablet by mouth nightly 30 tablet 3    metoprolol tartrate (LOPRESSOR) 25 MG tablet Take 1 tablet by mouth 2 times daily 60 tablet 3    amLODIPine (NORVASC) 10 MG tablet Take 1 tablet by mouth daily 30 tablet 3    chlorthalidone (HYGROTON) 25 MG tablet Take 1 tablet by mouth daily 30 tablet 3    sertraline (ZOLOFT) 50 MG tablet Take 2 tablets by mouth daily 30 tablet 3    aspirin 81 MG EC tablet Take 1 tablet by mouth daily 30 tablet 3    linagliptin (TRADJENTA) 5 MG tablet Take 1 tablet by mouth daily 30 tablet 5    ondansetron (ZOFRAN) 4 MG tablet Take 1 tablet by mouth every 8 hours as needed for Nausea or Vomiting 20 tablet 0    Lancets MISC 1 each by Does not apply route daily 100 each 3    furosemide (LASIX) 40 MG tablet Take 1 tablet by mouth daily 30 tablet 1    glucose monitoring kit (FREESTYLE) monitoring kit 1 each by Does not apply route once for 1 dose. 1 kit 0     No current facility-administered medications on file prior to encounter. REVIEW OF SYSTEMS    Pertinent items are noted in HPI. Constitutional: Negative for systemic symptoms including fever, chills and malaise. Objective:      BP (!) 190/99   Pulse 90   Temp 98.9 °F (37.2 °C) (Temporal)   Resp 18     PHYSICAL EXAM    General: The patient is in no acute distress. Mental status:  Patient is appropriate, is  oriented to place and plan of care. Dermatologic exam: Visual inspection of the periwound reveals the skin to be normal in turgor and texture  Wound exam: see wound description below in procedure note      Assessment:     Problem List Items Addressed This Visit     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 type 2 diabetes mellitus, with muscle involvement without evidence of necrosis ([facility] - Primary    Relevant Orders    Initiate Outpatient Wound Care Protocol        Procedure Note    Indications:  Based on my examination of this patient's wound(s) today, sharp excision into necrotic subcutaneous tissue is required to promote healing and evaluate the extent of previous healing. Performed by: Janes Jay DPM    Consent obtained: Yes    Time out taken:  Yes    Pain Control: none needed       Debridement:Non-excisional Debridement    Using #15 blade scalpel the wound(s) was/were sharply debrided down through and including the removal of subcutaneous tissue.         Devitalized Tissue Debrided:  callus    Pre Debridement Measurements:  Are located in the Wound Documentation Flow Sheet    All active wounds listed below with today's date are evaluated  Wound(s)    debrided this date include # : 1    Post  Debridement Measurements:  Wound 12/08/17 #3 abdoment (onset 3 days ago) SURGICAL (Active)   Number of days: 1537       Wound 12/08/17 #4 Lt plantar (onset 6 months ago) DIABETIC ALCALA 1 (Active)   Number of days: 1537       Wound 05/18/18 # 5 LEFT PLANTAR (ONSET 1 YEAR AGO) DM WAG 1 (Active)   Number of days: 0130       Wound 09/17/18 Left dorsal foot and 4th toe amp site 9/19/18 (Active)   Number of days: 9252       Incision 05/22/15 Foot Right (Active)   Number of days: 2468       Incision 12/04/17 Abdomen Mid (Active)   Number of days: 3163       Wound 01/11/19 left plantar foot wound (Active)   Number of days: 5108       Wound 03/21/19 Toe (Comment  which one) Anterior;Right (Active)   Number of days: 0285       Wound 03/21/19 Foot Left;Posterior hard callus area (Active)   Number of days: 8143       Wound 03/24/21 Left;Plantar (Active)   Number of days: 335       Wound 05/13/21 Left;Plantar (Active)   Number of days: 285       Wound 05/17/21 Groin Right scrotum extends to lower buttock (Active)   Number of days: 281       Wound 12/28/21 #3 Left Dorsal Foot (Active)   Wound Image   02/01/22 1528   Wound Etiology Non-Healing Surgical 02/22/22 1550   Dressing Status New dressing applied 02/22/22 1624   Wound Cleansed Soap and water 02/22/22 1550   Dressing/Treatment Collagen;Alginate;ABD;Tubular bandage 02/08/22 1617   Offloading for Diabetic Foot Ulcers Forefoot offloading shoe 02/22/22 1624   Wound Length (cm) 0 cm 02/22/22 1550   Wound Width (cm) 0 cm 02/22/22 1550   Wound Depth (cm) 0 cm 02/22/22 1550   Wound Surface Area (cm^2) 0 cm^2 02/22/22 1550   Change in Wound Size % (l*w) 100 02/22/22 1550   Wound Volume (cm^3) 0 cm^3 02/22/22 1550   Wound Healing % 100 02/22/22 1550   Post-Procedure Length (cm) 0.6 cm 02/08/22 1551   Post-Procedure Width (cm) 0.2 cm 02/08/22 1551   Post-Procedure Depth (cm) 0.4 cm 02/08/22 1551   Post-Procedure Surface Area (cm^2) 0.12 cm^2 02/08/22 1551   Post-Procedure Volume (cm^3) 0.048 cm^3 02/08/22 1551   Distance Tunneling (cm) 0 cm 02/22/22 1550   Tunneling Position ___ O'Clock 0 02/22/22 1550   Undermining Starts ___ O'Clock 0 02/22/22 1550   Undermining Ends___ O'Clock 0 02/22/22 1550   Undermining Maxium Distance (cm) 0 02/22/22 1550   Wound Assessment Eschar dry 02/22/22 1550   Drainage Amount None 02/22/22 1550   Drainage Description Serosanguinous 02/08/22 1524   Odor None 02/22/22 1550   Shazia-wound Assessment Dry/flaky 02/22/22 1550   Margins Attached edges 02/22/22 1550   Wound Thickness Description not for Pressure Injury Full thickness 02/22/22 1550   Number of days: 56       Percent of Wound(s) Debrided: approximately 100%    Total  Area  Debrided:  1 sq cm     Bleeding:  Minimal    Hemostasis Achieved:  by pressure    Procedural Pain:  0  / 10     Post Procedural Pain:  0 / 10     Response to treatment:  Well tolerated by patient. Status of wound progress and description from last visit:   stable. Plan:       Discharge Instructions         Discharge Instructions        PHYSICIAN ORDERS AND DISCHARGE INSTRUCTIONS     NOTE: Upon discharge from the [address], you will receive a patient experience survey.  We would be grateful if you would take the time to fill this survey out.     Wound care order history:                 BAIRON's   Right   1.38    Left  0.94           Date 9/21/2021              Vascular studies:   Date               Imaging: Enciso Muff ordered on 1/11/2022; 2/8/2022              Cultures:   Date obtained on 9/21/2021              Grafts:  Date               Antibiotics:               Earlier Wound care treatments:                          Primary care physician     Continuing wound care orders and information:              Residence:  Private               Continue [facility]: [facility]     Wound Medications:              CDKLG cleansing:                           KP not scrub or use excessive force.                          Wash hands with soap and water before and after dressing changes.                         Prior to applying a clean dressing, cleanse wound with normal saline,                                wound cleanser, or mild soap and water.                           Ask the physician or nurse before getting the wound(s) wet in a shower              Daily Wound management:                          Keep weight off wounds and reposition every 2 hours.                          [facility] standing for long periods of time.                          ABRQK wraps/stockings in AM and remove at bedtime.                          If swelling is present, elevate legs to the level of the heart or above for 30 minutes 4-5 times a day and/or when sitting.                                             When taking antibiotics take entire prescription as ordered by physician do not stop taking until medicine is all gone.                                                          Orders for this week: 2/22/2022               Auth for graft 2/8/2022        Left Plantar Foot -- healed      Left dorsal incision site --- wash with soap and water,pat dry. Apply anasept gel  and stimulin powder to wound bed and cover with calcium alginate, ABD and wrap with conform and tape. Change daily  Tubi F     Right leg---TUBI F         Rx: [facility]  Consult:  Central Scheduling: [phone]           Follow up with Dr. Jane Machado 1 week in the wound care center  Call  for any questions or concerns. Date__________   Time____________            Treatment Note Wound 12/28/21 #3 Left Dorsal Foot-Dressing/Treatment:  (anasept gel, stimulin ca alg abd confom tape tubi f )    Written Patient Dismissal Instructions Given     -Status post second metatarsal head excision right foot. Wound dehiscence dorsal aspect.  -No signs of infection today. Monitor off antibiotics. -Dorsal dehiscence responding well to daily wound care changes with collagen powder. Continue this.   -Right foot x-ray reviewed in detail by myself and I also looked at the radiologist read. No signs of underlying acute infection.   No signs of fracture or stress fracture.  -Continue to wear forefoot offloading shoe at all times.  -Follow-up 1 week for recheck sooner if needed       Electronically signed by Emilie Martin DPM on 2/22/2022 at 4:31 PM

## 2022-03-01 ENCOUNTER — HOSPITAL ENCOUNTER (OUTPATIENT)
Dept: WOUND CARE | Age: 47
Discharge: HOME | End: 2022-03-01
Payer: MEDICARE

## 2022-03-01 VITALS
RESPIRATION RATE: 20 BRPM | TEMPERATURE: 96.9 F | HEART RATE: 96 BPM | SYSTOLIC BLOOD PRESSURE: 115 MMHG | DIASTOLIC BLOOD PRESSURE: 79 MMHG

## 2022-03-01 DIAGNOSIS — E11.621 DIABETIC ULCER OF LEFT MIDFOOT ASSOCIATED WITH TYPE 2 DIABETES MELLITUS, WITH MUSCLE INVOLVEMENT WITHOUT EVIDENCE OF NECROSIS (HCC): Primary | ICD-10-CM

## 2022-03-01 DIAGNOSIS — L97.425 DIABETIC ULCER OF LEFT MIDFOOT ASSOCIATED WITH TYPE 2 DIABETES MELLITUS, WITH MUSCLE INVOLVEMENT WITHOUT EVIDENCE OF NECROSIS (HCC): Primary | ICD-10-CM

## 2022-03-01 PROCEDURE — 97597 DBRDMT OPN WND 1ST 20 CM/<: CPT

## 2022-03-01 RX ORDER — BACITRACIN ZINC AND POLYMYXIN B SULFATE 500; 1000 [USP'U]/G; [USP'U]/G
OINTMENT TOPICAL ONCE
Status: CANCELLED | OUTPATIENT
Start: 2022-03-01 | End: 2022-03-01

## 2022-03-01 RX ORDER — LIDOCAINE 50 MG/G
OINTMENT TOPICAL ONCE
Status: CANCELLED | OUTPATIENT
Start: 2022-03-01 | End: 2022-03-01

## 2022-03-01 RX ORDER — BACITRACIN, NEOMYCIN, POLYMYXIN B 400; 3.5; 5 [USP'U]/G; MG/G; [USP'U]/G
OINTMENT TOPICAL ONCE
Status: CANCELLED | OUTPATIENT
Start: 2022-03-01 | End: 2022-03-01

## 2022-03-01 RX ORDER — GENTAMICIN SULFATE 1 MG/G
OINTMENT TOPICAL ONCE
Status: CANCELLED | OUTPATIENT
Start: 2022-03-01 | End: 2022-03-01

## 2022-03-01 RX ORDER — CLOBETASOL PROPIONATE 0.5 MG/G
OINTMENT TOPICAL ONCE
Status: CANCELLED | OUTPATIENT
Start: 2022-03-01 | End: 2022-03-01

## 2022-03-01 RX ORDER — GINSENG 100 MG
CAPSULE ORAL ONCE
Status: CANCELLED | OUTPATIENT
Start: 2022-03-01 | End: 2022-03-01

## 2022-03-01 RX ORDER — LIDOCAINE 40 MG/G
CREAM TOPICAL ONCE
Status: CANCELLED | OUTPATIENT
Start: 2022-03-01 | End: 2022-03-01

## 2022-03-01 RX ORDER — LIDOCAINE HYDROCHLORIDE 40 MG/ML
SOLUTION TOPICAL ONCE
Status: CANCELLED | OUTPATIENT
Start: 2022-03-01 | End: 2022-03-01

## 2022-03-01 RX ORDER — BETAMETHASONE DIPROPIONATE 0.05 %
OINTMENT (GRAM) TOPICAL ONCE
Status: CANCELLED | OUTPATIENT
Start: 2022-03-01 | End: 2022-03-01

## 2022-03-01 ASSESSMENT — PAIN - FUNCTIONAL ASSESSMENT: PAIN_FUNCTIONAL_ASSESSMENT: PREVENTS OR INTERFERES SOME ACTIVE ACTIVITIES AND ADLS

## 2022-03-01 ASSESSMENT — PAIN DESCRIPTION - FREQUENCY: FREQUENCY: INTERMITTENT

## 2022-03-01 ASSESSMENT — PAIN DESCRIPTION - ONSET: ONSET: ON-GOING

## 2022-03-01 ASSESSMENT — PAIN DESCRIPTION - PROGRESSION: CLINICAL_PROGRESSION: NOT CHANGED

## 2022-03-01 ASSESSMENT — PAIN SCALES - GENERAL: PAINLEVEL_OUTOF10: 6

## 2022-03-01 ASSESSMENT — PAIN DESCRIPTION - PAIN TYPE: TYPE: ACUTE PAIN

## 2022-03-01 ASSESSMENT — PAIN DESCRIPTION - ORIENTATION: ORIENTATION: LEFT

## 2022-03-01 ASSESSMENT — PAIN DESCRIPTION - DESCRIPTORS: DESCRIPTORS: SHARP;SHOOTING;THROBBING

## 2022-03-01 ASSESSMENT — PAIN DESCRIPTION - LOCATION: LOCATION: FOOT

## 2022-03-01 NOTE — PROGRESS NOTES
[facility] Care Center Progress Note With Procedure    Pratibha Zapien  AGE: 55 y.o. GENDER: male  : 1975  EPISODE DATE:  3/1/2022     Subjective:     Chief Complaint   Patient presents with    Wound Check     left foot         HISTORY of PRESENT ILLNESS      Pratibha Zapien is a 55 y.o. male who presents today for wound evaluation of dehiscence surgical wound top of the left foot. No worsening changes to the left foot. Says he is doing well overall. Is having some pain to the outside left foot where he slipped a few weeks ago but overall doing well. PAST MEDICAL HISTORY        Diagnosis Date    Abscess     scrotal    Acute renal failure (ARF) ([address] 2019    Anxiety associated with depression     Anxiety associated with depression     Back pain 2012    Chest pain 2013    Diabetic nephropathy ([address]     Diabetic neuropathy ([address] 2011    Diabetic ulcer of left midfoot associated with type 2 diabetes mellitus, with fat layer exposed ([address] 2017    Diverticulosis     C scope + Dr. Rosario Jimenez DM (diabetes mellitus), type 2 ([address]     DR. 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 Rosario Jimenez Necrotizing fasciitis [facility])     Nose fracture     Panic attacks     Pericarditis     Hospitalized with s/p heart cath normal    Peripheral autonomic neuropathy due to diabetes mellitus ([address]     Axonal EMG- N[facility], 2011    Sebaceous cyst 2011    URI (upper respiratory infection) 2012    WD-Wound, surgical, infected, initial encounter 2017    Wrist fracture 1986       PAST SURGICAL HISTORY    Past Surgical History:   Procedure Laterality Date   [address] 2013    Normal ( [place])    COLONOSCOPY  6/2/2011    Pandiverticulosis, Nonbleeding internal hemorrhoids, Repeat colonoscopy at age 48- Dr. Thelma Rosen Left 12/21/2021    EXCISION OF HEAD LEFT 2ND METATARSAL performed by Lexie Schuster DPM at [place] [address]    \"had reconstruction surgery on nose a year after the other nose surgery\"    OTHER SURGICAL HISTORY Right 05/22/2015    I & D right great toe with partial amputation    OTHER SURGICAL HISTORY  12/04/2017    inc and drainage of abcess    MT DEEP INCIS FOOT BONE INFECTN Left 9/22/2018    LEFT FOOT DEBRIDEMENT INCISION AND DRAINAGE TOP AND BOTTOM performed by Betsy Srinivasan DPM at [facility] N/A 5/15/2021    SCROTAL AND PERINEAL DEBRIDEMENT performed by Soraya Menchaca MD at [address] [place] [address] 5/16/2021    SCROTAL RE-DEBRIDEMENT  INCISION AND DRAINAGE performed by Soraya Menchaca MD at [address] [place] [address]    sebaceous cyst removal times 4     TOE AMPUTATION      right great toe    TOE AMPUTATION Right 3/23/2019    TOE AMPUTATION RIGHT 5TH TOE performed by Betsy Srinivasan DPM at [place] [address] TOE AMPUTATION Right 8/6/2019    TOE AMPUTATION RIGHT 4TH TOE performed by Betsy Srinivasan DPM at [address] [place] [address] UPPER GASTROINTESTINAL ENDOSCOPY  06/2/2011    Mild gastritis with moderatley severe antritis, small hiatal hernia, Dr. Ranulfo Russell N/A 1/12/2019    EGD BIOPSY performed by Daniela Krishna MD at [facility]    Family History   Problem Relation Age of Onset    Cancer Mother         ?site    Stroke Mother     Bleeding Prob Mother     Diabetes Father     Heart Disease Father     High Blood Pressure Father     Obesity Father     Kidney Disease Father     Diabetes Sister     High Blood Pressure Sister     Mental Illness Sister     Obesity Sister     High Blood Pressure Other     Mental Illness Other bipolar    Other Son         cyst in ear canal    ADHD Daughter        SOCIAL HISTORY    Social History     Tobacco Use    Smoking status: Current Some Day Smoker     Years: 20.00     Types: Cigarettes    Smokeless tobacco: Never Used    Tobacco comment: Smokes when drinking/social   Vaping Use    Vaping Use: Never used   Substance Use Topics    Alcohol use: Yes     Comment: occ    Drug use: Yes     Frequency: 7.0 times per week     Types: Marijuana [place] Free)     Comment: 12/20/21 @ 2000       ALLERGIES    Allergies   Allergen Reactions    Adhesive Tape Rash    Doxycycline Nausea And Vomiting    Reglan [Metoclopramide] Anxiety       MEDICATIONS    Current Outpatient Medications on File Prior to Encounter   Medication Sig Dispense Refill    apixaban (ELIQUIS) 2.5 MG TABS tablet Take 1 tablet by mouth 2 times daily 60 tablet 5    clopidogrel (PLAVIX) 75 MG tablet Take 1 tablet by mouth daily 30 tablet 3    insulin glargine (LANTUS) 100 UNIT/ML injection vial Inject 75 Units into the skin nightly      insulin aspart (NOVOLOG RELION) 100 UNIT/ML injection vial Inject 35 Units into the skin 3 times daily (before meals)      atorvastatin (LIPITOR) 40 MG tablet Take 1 tablet by mouth nightly 30 tablet 3    metoprolol tartrate (LOPRESSOR) 25 MG tablet Take 1 tablet by mouth 2 times daily 60 tablet 3    amLODIPine (NORVASC) 10 MG tablet Take 1 tablet by mouth daily 30 tablet 3    chlorthalidone (HYGROTON) 25 MG tablet Take 1 tablet by mouth daily 30 tablet 3    sertraline (ZOLOFT) 50 MG tablet Take 2 tablets by mouth daily 30 tablet 3    aspirin 81 MG EC tablet Take 1 tablet by mouth daily 30 tablet 3    linagliptin (TRADJENTA) 5 MG tablet Take 1 tablet by mouth daily 30 tablet 5    ondansetron (ZOFRAN) 4 MG tablet Take 1 tablet by mouth every 8 hours as needed for Nausea or Vomiting 20 tablet 0    Lancets MISC 1 each by Does not apply route daily 100 each 3    furosemide (LASIX) 40 MG tablet Take 1 tablet by mouth daily 30 tablet 1    glucose monitoring kit (FREESTYLE) monitoring kit 1 each by Does not apply route once for 1 dose. 1 kit 0     No current facility-administered medications on file prior to encounter. REVIEW OF SYSTEMS    Pertinent items are noted in HPI. Constitutional: Negative for systemic symptoms including fever, chills and malaise. Objective:      /79   Pulse 96   Temp 96.9 °F (36.1 °C) (Temporal)   Resp 20     PHYSICAL EXAM    General: The patient is in no acute distress. Mental status:  Patient is appropriate, is  oriented to place and plan of care. Dermatologic exam: Visual inspection of the periwound reveals the skin to be normal in turgor and texture  Wound exam: see wound description below in procedure note      Assessment:     Problem List Items Addressed This Visit     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 type 2 diabetes mellitus, with muscle involvement without evidence of necrosis ([facility] - Primary    Relevant Orders    Initiate Outpatient Wound Care Protocol        Procedure Note    Indications:  Based on my examination of this patient's wound(s) today, sharp excision into necrotic subcutaneous tissue is required to promote healing and evaluate the extent of previous healing. Performed by: Faisal Oquendo DPM    Consent obtained: Yes    Time out taken:  Yes    Pain Control: lidocaine       Debridement:Non-excisional Debridement    Using #15 blade scalpel the wound(s) was/were sharply debrided down through and including the removal of subcutaneous tissue.         Devitalized Tissue Debrided:  slough    Pre Debridement Measurements:  Are located in the Wound Documentation Flow Sheet    All active wounds listed below with today's date are evaluated  Wound(s)    debrided this date include # : 1     Post  Debridement Measurements:  Wound 12/08/17 #3 abdoment (onset 3 days ago) SURGICAL (Active)   Number of days: 1544       Wound 12/08/17 #4 Lt plantar (onset 6 months ago) DIABETIC ALCALA 1 (Active)   Number of days: 9645       Wound 05/18/18 # 5 LEFT PLANTAR (ONSET 1 YEAR AGO) DM WAG 1 (Active)   Number of days: 9183       Wound 09/17/18 Left dorsal foot and 4th toe amp site 9/19/18 (Active)   Number of days: 9801       Incision 05/22/15 Foot Right (Active)   Number of days: 4989       Incision 12/04/17 Abdomen Mid (Active)   Number of days: 0973       Wound 01/11/19 left plantar foot wound (Active)   Number of days: 5846       Wound 03/21/19 Toe (Comment  which one) Anterior;Right (Active)   Number of days: 1076       Wound 03/21/19 Foot Left;Posterior hard callus area (Active)   Number of days: 1076       Wound 03/24/21 Left;Plantar (Active)   Number of days: 342       Wound 05/13/21 Left;Plantar (Active)   Number of days: 292       Wound 05/17/21 Groin Right scrotum extends to lower buttock (Active)   Number of days: 288       Wound 12/28/21 #3 Left Dorsal Foot (Active)   Wound Image   03/01/22 1558   Wound Etiology Non-Healing Surgical 03/01/22 1558   Dressing Status New dressing applied 03/01/22 1642   Wound Cleansed Soap and water 03/01/22 1558   Dressing/Treatment Collagen;Alginate;ABD;Tubular bandage 02/08/22 1617   Offloading for Diabetic Foot Ulcers Forefoot offloading shoe 02/22/22 1624   Wound Length (cm) 0 cm 03/01/22 1558   Wound Width (cm) 0 cm 03/01/22 1558   Wound Depth (cm) 0 cm 03/01/22 1558   Wound Surface Area (cm^2) 0 cm^2 03/01/22 1558   Change in Wound Size % (l*w) 100 03/01/22 1558   Wound Volume (cm^3) 0 cm^3 03/01/22 1558   Wound Healing % 100 03/01/22 1558   Post-Procedure Length (cm) 0.1 cm 03/01/22 1626   Post-Procedure Width (cm) 0.1 cm 03/01/22 1626   Post-Procedure Depth (cm) 0.1 cm 03/01/22 1626   Post-Procedure Surface Area (cm^2) 0.01 cm^2 03/01/22 1626   Post-Procedure Volume (cm^3) 0.001 cm^3 03/01/22 1626   Distance Tunneling (cm) 0 cm 02/22/22 1550   Tunneling Position ___ O'Clock 0 02/22/22 1550   Undermining Starts ___ O'Clock 0 02/22/22 1550   Undermining Ends___ O'Clock 0 02/22/22 1550   Undermining Maxium Distance (cm) 0 02/22/22 1550   Wound Assessment Eschar dry 02/22/22 1550   Drainage Amount None 02/22/22 1550   Drainage Description Serosanguinous 02/08/22 1524   Odor None 02/22/22 1550   Shazia-wound Assessment Dry/flaky 02/22/22 1550   Margins Attached edges 02/22/22 1550   Wound Thickness Description not for Pressure Injury Full thickness 02/22/22 1550   Number of days: 63       Percent of Wound(s) Debrided: approximately 100%    Total  Area  Debrided:  1 sq cm     Bleeding:  None    Hemostasis Achieved:  not needed    Procedural Pain:  0  / 10     Post Procedural Pain:  0 / 10     Response to treatment:  Well tolerated by patient. Status of wound progress and description from last visit:   improving. Plan:       Discharge Instructions            Discharge Instructions        PHYSICIAN ORDERS AND DISCHARGE INSTRUCTIONS     NOTE: Upon discharge from the [address], you will receive a patient experience survey. We would be grateful if you would take the time to fill this survey out.     Wound care order history:                 BAIRON's   Right   1.38    Left  0.94           Date 9/21/2021              Vascular studies:   Date               Imaging: Shaheen Corbetts ordered on 1/11/2022; 2/8/2022              Cultures:   Date obtained on 9/21/2021              Grafts:  Date               Antibiotics:               Earlier Wound care treatments:                          Primary care physician     Continuing wound care orders and information:              Residence:  Private               Continue home health care with: [facility]     Wound Medications:              BFYRR cleansing:                           SQ not scrub or use excessive force.                          Wash hands with soap and water before and after dressing changes.                           Prior to applying a clean dressing, cleanse wound with normal saline,                                wound cleanser, or mild soap and water.                           Ask the physician or nurse before getting the wound(s) wet in a shower              Daily Wound management:                          XHYB weight off wounds and reposition every 2 hours.                          VWJOO standing for long periods of time.                          QBFLM wraps/stockings in AM and remove at bedtime.                          If swelling is present, elevate legs to the level of the heart or above for 30 minutes 4-5 times a day and/or when sitting.                                             When taking antibiotics take entire prescription as ordered by physician do not stop taking until medicine is all gone.                                                          Orders for this week: /               Auth for graft 2022        Left Plantar Foot -- healed      Left dorsal incision site --- wash with soap and water,pat dry. Apply anasept gel  and stimulin powder to wound bed and cover with calcium alginate, ABD and wrap with conform and tape. Change daily  Tubi F     Right leg---TUBI F         Rx: [facility] [employer]  Consult:  [facility] [phone]           Follow up with Dr. Adelfo Jacobo 2 week in the wound care center  Call [phone] for any questions or concerns. Date__________   Time____________                        Treatment Note Wound 21 #3 Left Dorsal Foot-Dressing/Treatment:  (Anasept, Stimulen, ca alginate, ABD, conform, tape)    -Right foot wounds are doing well overall with no signs of infection. -Monitor off antibiotics.  -Continue to apply collagen to the dorsal foot open dehiscence site. This is almost healed.   -Can ambulate in supportive diabetic shoes at this time.  -We will look into starting process for new diabetic shoes as patient has had this pair for a long time.  -Follow-up 2 weeks for recheck    Written Patient Dismissal Instructions Given            Electronically signed by Higinio Gonsalez DPM on 3/1/2022 at 4:51 PM

## 2022-03-08 ENCOUNTER — HOSPITAL ENCOUNTER (OUTPATIENT)
Dept: WOUND CARE | Age: 47
Discharge: HOME | End: 2022-03-08

## 2022-03-15 ENCOUNTER — HOSPITAL ENCOUNTER (OUTPATIENT)
Dept: WOUND CARE | Age: 47
Discharge: HOME | End: 2022-03-15
Payer: MEDICARE

## 2022-03-15 VITALS
DIASTOLIC BLOOD PRESSURE: 100 MMHG | SYSTOLIC BLOOD PRESSURE: 164 MMHG | HEART RATE: 89 BPM | TEMPERATURE: 98.7 F | RESPIRATION RATE: 18 BRPM

## 2022-03-15 DIAGNOSIS — L97.425 DIABETIC ULCER OF LEFT MIDFOOT ASSOCIATED WITH TYPE 2 DIABETES MELLITUS, WITH MUSCLE INVOLVEMENT WITHOUT EVIDENCE OF NECROSIS (HCC): Primary | ICD-10-CM

## 2022-03-15 DIAGNOSIS — E11.621 DIABETIC ULCER OF LEFT MIDFOOT ASSOCIATED WITH TYPE 2 DIABETES MELLITUS, WITH MUSCLE INVOLVEMENT WITHOUT EVIDENCE OF NECROSIS (HCC): Primary | ICD-10-CM

## 2022-03-15 PROCEDURE — 99212 OFFICE O/P EST SF 10 MIN: CPT

## 2022-03-15 RX ORDER — LIDOCAINE 40 MG/G
CREAM TOPICAL ONCE
Status: CANCELLED | OUTPATIENT
Start: 2022-03-15 | End: 2022-03-15

## 2022-03-15 RX ORDER — LIDOCAINE 50 MG/G
OINTMENT TOPICAL ONCE
Status: CANCELLED | OUTPATIENT
Start: 2022-03-15 | End: 2022-03-15

## 2022-03-15 RX ORDER — GENTAMICIN SULFATE 1 MG/G
OINTMENT TOPICAL ONCE
Status: CANCELLED | OUTPATIENT
Start: 2022-03-15 | End: 2022-03-15

## 2022-03-15 RX ORDER — BACITRACIN, NEOMYCIN, POLYMYXIN B 400; 3.5; 5 [USP'U]/G; MG/G; [USP'U]/G
OINTMENT TOPICAL ONCE
Status: CANCELLED | OUTPATIENT
Start: 2022-03-15 | End: 2022-03-15

## 2022-03-15 RX ORDER — GINSENG 100 MG
CAPSULE ORAL ONCE
Status: CANCELLED | OUTPATIENT
Start: 2022-03-15 | End: 2022-03-15

## 2022-03-15 RX ORDER — BETAMETHASONE DIPROPIONATE 0.05 %
OINTMENT (GRAM) TOPICAL ONCE
Status: CANCELLED | OUTPATIENT
Start: 2022-03-15 | End: 2022-03-15

## 2022-03-15 RX ORDER — LIDOCAINE HYDROCHLORIDE 40 MG/ML
SOLUTION TOPICAL ONCE
Status: CANCELLED | OUTPATIENT
Start: 2022-03-15 | End: 2022-03-15

## 2022-03-15 RX ORDER — CLOBETASOL PROPIONATE 0.5 MG/G
OINTMENT TOPICAL ONCE
Status: CANCELLED | OUTPATIENT
Start: 2022-03-15 | End: 2022-03-15

## 2022-03-15 RX ORDER — BACITRACIN ZINC AND POLYMYXIN B SULFATE 500; 1000 [USP'U]/G; [USP'U]/G
OINTMENT TOPICAL ONCE
Status: CANCELLED | OUTPATIENT
Start: 2022-03-15 | End: 2022-03-15

## 2022-03-15 ASSESSMENT — PAIN SCALES - GENERAL: PAINLEVEL_OUTOF10: 6

## 2022-03-15 NOTE — PROGRESS NOTES
Wound Care Center Progress Note With Procedure    Ana Rodrigez  AGE: 55 y.o. GENDER: male  : 1975  EPISODE DATE:  3/15/2022     Subjective:     Chief Complaint   Patient presents with    Wound Check     left foot          HISTORY of PRESENT ILLNESS      Ana Rodrigez is a 55 y.o. male who presents today for wound evaluation of dorsal aspect left foot. Patient says doing well overall. Swelling and bruising to the left foot from past injuries also doing much better. Denies any constitutional symptoms. PAST MEDICAL HISTORY        Diagnosis Date    Abscess     scrotal    Acute renal failure (ARF) ([address] 2019    Anxiety associated with depression     Anxiety associated with depression     Back pain 2012    Chest pain 2013    Diabetic nephropathy ([address]     Diabetic neuropathy ([address] 2011    Diabetic ulcer of left midfoot associated with type 2 diabetes mellitus, with fat layer exposed ([address] 2017    Diverticulosis     C scope + Dr. Micky Dominique DM (diabetes mellitus), type 2 ([address]     DR. 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 Micky Dominique Necrotizing fasciitis [facility])     Nose fracture     Panic attacks     Pericarditis     Hospitalized with s/p heart cath normal    Peripheral autonomic neuropathy due to diabetes mellitus ([address]     Axonal EMG- NCS, 2011    Sebaceous cyst 2011    URI (upper respiratory infection) 2012    WD-Wound, surgical, infected, initial encounter 2017    Wrist fracture 1986       PAST SURGICAL HISTORY    Past Surgical History:   Procedure Laterality Date    ABDOMEN SURGERY      CARDIAC CATHETERIZATION  2013    Normal (Dr. Chelsea Hyde)    COLONOSCOPY  2011 Pandiverticulosis, Nonbleeding internal hemorrhoids, Repeat colonoscopy at age 48- Dr. Silva Files Left 12/21/2021    EXCISION OF HEAD LEFT 2ND METATARSAL performed by Vernell Vargas DPM at [place] [address]    \"had reconstruction surgery on nose a year after the other nose surgery\"    OTHER SURGICAL HISTORY Right 05/22/2015    I & D right great toe with partial amputation    OTHER SURGICAL HISTORY  12/04/2017    inc and drainage of abcess    SD DEEP INCIS FOOT BONE INFECTN Left 9/22/2018    LEFT FOOT DEBRIDEMENT INCISION AND DRAINAGE TOP AND BOTTOM performed by José Liriano DPM at [facility] N/A 5/15/2021    SCROTAL AND PERINEAL DEBRIDEMENT performed by Selena Nayak MD at [facility] N/A 5/16/2021    SCROTAL RE-DEBRIDEMENT  INCISION AND DRAINAGE performed by Selena Nayak MD at [address] [place] [id number]    sebaceous cyst removal times 4     TOE AMPUTATION      right great toe    TOE AMPUTATION Right 3/23/2019    TOE AMPUTATION RIGHT 5TH TOE performed by José Liriano DPM at [place] [address] TOE AMPUTATION Right 8/6/2019    TOE AMPUTATION RIGHT 4TH TOE performed by José Liriano DPM at [address] [place] [address] UPPER GASTROINTESTINAL ENDOSCOPY  06/2/2011    Mild gastritis with moderatley severe antritis, small hiatal hernia,  [address] N/A 1/12/2019    EGD BIOPSY performed by Sandra Contreras MD at [place] [id number] History   Problem Relation Age of Onset    Cancer Mother         ?site    Stroke Mother     Bleeding Prob Mother     Diabetes Father     Heart Disease Father     High Blood Pressure Father     Obesity Father     Kidney Disease Father     Diabetes Sister     High Blood Pressure Sister     Mental Illness Sister     Obesity Sister     High Blood Pressure Other     Mental Illness Other         bipolar    Other Son         cyst in ear canal    ADHD Daughter        SOCIAL HISTORY    Social History     Tobacco Use    Smoking status: Current Some Day Smoker     Years: 20.00     Types: Cigarettes    Smokeless tobacco: Never Used    Tobacco comment: Smokes when drinking/social   Vaping Use    Vaping Use: Never used   Substance Use Topics    Alcohol use: Yes     Comment: occ    Drug use: Yes     Frequency: 7.0 times per week     Types: Marijuana Billie Mireya)     Comment: 12/20/21 @ 2000       ALLERGIES    Allergies   Allergen Reactions    Adhesive Tape Rash    Doxycycline Nausea And Vomiting    Reglan [Metoclopramide] Anxiety       MEDICATIONS    Current Outpatient Medications on File Prior to Encounter   Medication Sig Dispense Refill    apixaban (ELIQUIS) 2.5 MG TABS tablet Take 1 tablet by mouth 2 times daily 60 tablet 5    clopidogrel (PLAVIX) 75 MG tablet Take 1 tablet by mouth daily 30 tablet 3    insulin glargine (LANTUS) 100 UNIT/ML injection vial Inject 75 Units into the skin nightly      insulin aspart (NOVOLOG RELION) 100 UNIT/ML injection vial Inject 35 Units into the skin 3 times daily (before meals)      atorvastatin (LIPITOR) 40 MG tablet Take 1 tablet by mouth nightly 30 tablet 3    metoprolol tartrate (LOPRESSOR) 25 MG tablet Take 1 tablet by mouth 2 times daily 60 tablet 3    amLODIPine (NORVASC) 10 MG tablet Take 1 tablet by mouth daily 30 tablet 3    chlorthalidone (HYGROTON) 25 MG tablet Take 1 tablet by mouth daily 30 tablet 3    sertraline (ZOLOFT) 50 MG tablet Take 2 tablets by mouth daily 30 tablet 3    aspirin 81 MG EC tablet Take 1 tablet by mouth daily 30 tablet 3    linagliptin (TRADJENTA) 5 MG tablet Take 1 tablet by mouth daily 30 tablet 5    ondansetron (ZOFRAN) 4 MG tablet Take 1 tablet by mouth every 8 hours as needed for Nausea or Vomiting 20 tablet 0    Lancets MISC 1 each by Does not apply route daily 100 each 3    furosemide (LASIX) 40 MG tablet Take 1 tablet by mouth daily 30 tablet 1    glucose monitoring kit (FREESTYLE) monitoring kit 1 each by Does not apply route once for 1 dose. 1 kit 0     No current facility-administered medications on file prior to encounter. REVIEW OF SYSTEMS    Pertinent items are noted in HPI. Constitutional: Negative for systemic symptoms including fever, chills and malaise. Objective:      BP (!) 164/100   Pulse 89   Temp 98.7 °F (37.1 °C) (Temporal)   Resp 18     PHYSICAL EXAM    General: The patient is in no acute distress. Mental status:  Patient is appropriate, is  oriented to place and plan of care. Dermatologic exam: Visual inspection of the periwound reveals the skin to be dry  Wound exam: see wound description below in procedure note      Assessment:     Problem List Items Addressed This Visit     WD-Diabetic ulcer of left midfoot Alcala 2 associated with type 2 diabetes mellitus, with muscle involvement without evidence of necrosis ([facility] - Primary    Relevant Orders    Initiate Outpatient Wound Care Protocol        Procedure Note    Indications:  Based on my examination of this patient's wound(s) today, sharp excision into necrotic dermis is required to promote healing and evaluate the extent of previous healing. Performed by: Maicol Duncan DPM    Consent obtained: Yes    Time out taken:  Yes    Pain Control: lidocaine       Debridement:Non-excisional Debridement    Using curette the wound(s) was/were sharply debrided down through and including the removal of epidermis and dermis.         Devitalized Tissue Debrided:  fibrin    Pre Debridement Measurements:  Are located in the Wound Documentation Flow Sheet    All active wounds listed below with today's date are evaluated  Wound(s)    debrided this date include # : 1     Post  Debridement Measurements:  Wound 12/08/17 #3 abdoment (onset 3 days ago) SURGICAL (Active)   Number of days: 1558       Wound 12/08/17 #4 Lt plantar (onset 6 months ago) DIABETIC ALCALA 1 (Active)   Number of days: 1558       Wound 05/18/18 # 5 LEFT PLANTAR (ONSET 1 YEAR AGO) DM WAG 1 (Active)   Number of days: 5715       Wound 09/17/18 Left dorsal foot and 4th toe amp site 9/19/18 (Active)   Number of days: 8031       Incision 05/22/15 Foot Right (Active)   Number of days: 2489       Incision 12/04/17 Abdomen Mid (Active)   Number of days: 7975       Wound 01/11/19 left plantar foot wound (Active)   Number of days: 1159       Wound 03/21/19 Toe (Comment  which one) Anterior;Right (Active)   Number of days: 1090       Wound 03/21/19 Foot Left;Posterior hard callus area (Active)   Number of days: 1090       Wound 03/24/21 Left;Plantar (Active)   Number of days: 356       Wound 05/13/21 Left;Plantar (Active)   Number of days: 305       Wound 05/17/21 Groin Right scrotum extends to lower buttock (Active)   Number of days: 302       Percent of Wound(s) Debrided: approximately 100%    Total  Area  Debrided:  1 sq cm     Bleeding:  None    Hemostasis Achieved:  not needed    Procedural Pain:  0  / 10     Post Procedural Pain:  0 / 10     Response to treatment:  Well tolerated by patient. Status of wound progress and description from last visit:   healed. Plan:       Discharge Instructions         Discharge Instructions        PHYSICIAN ORDERS AND DISCHARGE INSTRUCTIONS     NOTE: Upon discharge from the [address], you will receive a patient experience survey.  We would be grateful if you would take the time to fill this survey out.     Wound care order history:                 BAIRON's   Right   1.38    Left  0.94           Date 9/21/2021              Vascular studies:   Date               Imaging: Tatiana Borrego ordered on 1/11/2022; 2/8/2022              Cultures:   Date obtained on 9/21/2021              Grafts:  Date               Antibiotics:               Earlier Wound care treatments:                          Primary care physician     Continuing wound care orders and information:              Residence:  Private             Continue [facility] health care with: [facility]     Wound Medications:              TRPNA cleansing:                           ZA not scrub or use excessive force.                          Wash hands with soap and water before and after dressing changes.                         Prior to applying a clean dressing, cleanse wound with normal saline,                                wound cleanser, or mild soap and water.                           Ask the physician or nurse before getting the wound(s) wet in a shower              Daily Wound management:                          Keep weight off wounds and reposition every 2 hours.                          TEPRF standing for long periods of time.                          UEGEO wraps/stockings in AM and remove at bedtime.                          If swelling is present, elevate legs to the level of the heart or above for 30 minutes 4-5 times a day and/or when sitting.                                             When taking antibiotics take entire prescription as ordered by physician do not stop taking until medicine is all gone.                                                          Orders for this week: 3/15/2022               Auth for graft 2022        Left Plantar Foot -- healed      Left dorsal incision site ---  Right leg---TUBI F         Rx: [place] [facility]  Consult:  Central [phone]      Follow p with Dr Mayuri Llanos in Office on [facility]      Discharge today from the wound care center  Call  for any questions or concerns. Date__________   Time____________               Treatment Note     -Nonhealing wound to the top of the left foot has completely epithelialized. -No signs of infection monitor off antibiotics. -Recommend patient go to [place] [address] clinic to be fitted for a new pair of diabetic shoes.   To go through PCP to get prescription form for this.  -Patient make appointment with me in my regular office in 3 months for routine diabetic foot examination.   Sooner if needed if any wounds opened up again.  -Follow-up 3 months    Written Patient Dismissal Instructions Given            Electronically signed by Catracho Haynes DPM on 3/15/2022 at 4:06 PM

## 2022-05-23 NOTE — PROGRESS NOTES
PHARMACY TO DOSE VANCOMYCIN PER DR Nabil Salazar    INFECTION BEING TREATED = Diabetic foot infection (concern for OM)  CULTURES = --  OTHER ABT'S = Zosyn    AGE = 37 y.o.     SEX = male  HEIGHT = 5' 9\" (1.753 m)  Wt Readings from Last 3 Encounters:   03/21/19 271 lb 3.2 oz (123 kg)   01/11/19 259 lb 3.2 oz (117.6 kg)   01/09/19 265 lb (120.2 kg)     Estimated Creatinine Clearance: 103 mL/min (based on SCr of 1.2 mg/dL).     Recent Labs     03/21/19  0300   WBC 9.7   BUN 29*   CREATININE 1.2   LACTATE 2.1  LACT CALLED TO GRISEL JOSEPH IN ED AT 0351, Jeanell Eisenmenger MLS  RESULTS READ BACK  *     DOSING PLAN COMMENTS:  -Vancomycin 2,000 mg IV given initially; follow with Vancomycin 1,500 mg IV Q 8 Hours  -Goal trough = 15 - 20    VANCO TROUGH SCHEDULED FOR 03/22/2019 @ 05:30 AM    Lynda Preciado [facility]  3/21/2019   6:08 AM  _______________________________________ [FreeTextEntry1] : Mr. CRISTAL BERGMAN is a 64 year old former smoker (>30 pack year hx, quit 10/2021) w Schizophrenia, COPD here for f/u. \par \par #COPD - mod, Gold Gr A\par -- change from Incruse to Anoro\par \par #Former smoker - quit 10/2021\par -- LDCT 10/2021, repeat in one year\par \par #Snoring - has sleep study scheduled in June \par \par #Dilated ascending aortia - noted on CT, stable\par -- advised to f/u with cardiology for TTE\par \par #HCM - Prevnar 20 today; COVID vaccinated, boosted\par \par All questions answered. Patient in agreement with plan. \par Follow up in 3mo  or sooner if needed.\par

## 2022-05-24 ENCOUNTER — HOSPITAL ENCOUNTER (EMERGENCY)
Age: 47
Discharge: HOME | End: 2022-05-24
Payer: MEDICARE

## 2022-05-24 VITALS
SYSTOLIC BLOOD PRESSURE: 140 MMHG | TEMPERATURE: 98.1 F | DIASTOLIC BLOOD PRESSURE: 87 MMHG | RESPIRATION RATE: 20 BRPM | HEART RATE: 97 BPM | OXYGEN SATURATION: 97 %
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
## 2022-05-24 PROCEDURE — 90715 TDAP VACCINE 7 YRS/> IM: CPT | Performed by: PHYSICIAN ASSISTANT

## 2022-05-24 PROCEDURE — 6360000002 HC RX W HCPCS: Performed by: PHYSICIAN ASSISTANT

## 2022-05-24 PROCEDURE — 6370000000 HC RX 637 (ALT 250 FOR IP): Performed by: PHYSICIAN ASSISTANT

## 2022-05-24 PROCEDURE — 99284 EMERGENCY DEPT VISIT MOD MDM: CPT

## 2022-05-24 PROCEDURE — 90471 IMMUNIZATION ADMIN: CPT | Performed by: PHYSICIAN ASSISTANT

## 2022-05-24 RX ORDER — DOXYCYCLINE HYCLATE 100 MG
200 TABLET ORAL ONCE
Status: COMPLETED | OUTPATIENT
Start: 2022-05-24 | End: 2022-05-24

## 2022-05-24 RX ORDER — ONDANSETRON 4 MG/1
4 TABLET, ORALLY DISINTEGRATING ORAL ONCE
Status: COMPLETED | OUTPATIENT
Start: 2022-05-24 | End: 2022-05-24

## 2022-05-24 RX ADMIN — ONDANSETRON 4 MG: 4 TABLET, ORALLY DISINTEGRATING ORAL at 04:50

## 2022-05-24 RX ADMIN — TETANUS TOXOID, REDUCED DIPHTHERIA TOXOID AND ACELLULAR PERTUSSIS VACCINE, ADSORBED 0.5 ML: 5; 2.5; 8; 8; 2.5 SUSPENSION INTRAMUSCULAR at 04:50

## 2022-05-24 RX ADMIN — DOXYCYCLINE HYCLATE 200 MG: 100 TABLET, COATED ORAL at 04:50

## 2022-05-24 ASSESSMENT — PAIN - FUNCTIONAL ASSESSMENT: PAIN_FUNCTIONAL_ASSESSMENT: NONE - DENIES PAIN

## 2022-05-24 NOTE — ED NOTES
Discharge instructions reviewed with patient and all questions addressed. Patient alert and oriented x4 at time of discharge. Patient ambulatory and steady gait.       Nicholas Lee RN  05/24/22 8814

## 2022-05-24 NOTE — ED PROVIDER NOTES
Emergency [address] EMERGENCY DEPARTMENT    Patient: Jill Hughes  MRN: [id number]  : 1975  Date of Evaluation: 2022  ED Provider: Gen Mckeon PA-C    Chief Complaint       Chief Complaint   Patient presents with    Tick Removal     L inner thigh       Ashtyn Hickey is a 52 y.o. male who presents to the emergency department for tick removal.  Patient noticed a tick to the posterior left thigh earlier tonight. He states he suspects he acquired the tick sometime in the last 4 days. He attended a concert that was out in a field on Friday and also reports that his dog sleeps with them and could have had one. He denies any associated pain. He denies any other complaints. ROS     MUSCULOSKELETAL:  Denies extremity pain or swelling. INTEGUMENT:  + tick bite    Past History     Past Medical History:   Diagnosis Date    2010    scrotal    Acute renal failure (ARF) ([address] 2019    Anxiety associated with depression     Anxiety associated with depression     Back pain 2012    Chest pain 2013    Diabetic nephropathy ([address]     Diabetic neuropathy ([address] 2011    Diabetic ulcer of left midfoot associated with type 2 diabetes mellitus, with fat layer exposed ([address] 2017    Diverticulosis     C scope + Dr. Darline Guillaume DM (diabetes mellitus), type 2 ([address]     DR. 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 Darline Guillaume Necrotizing fasciitis [facility])     Nose fracture     Panic attacks     Pericarditis     Hospitalized with s/p heart cath normal    Peripheral autonomic neuropathy due to diabetes mellitus ([address]     Axonal EMG- NCS, 2011    Sebaceous cyst 09/01/2011    URI (upper respiratory infection) 02/27/2012    WD-Wound, surgical, infected, initial encounter 12/08/2017    Wrist fracture 1986     Past Surgical History:   Procedure Laterality Date    ABDOMEN SURGERY      CARDIAC CATHETERIZATION  2003, 2013    Normal (Dr. Steffen Owens)    COLONOSCOPY  6/2/2011    Pandiverticulosis, Nonbleeding internal hemorrhoids, Repeat colonoscopy at age 48- Dr. Kirkpatrick Bert Left 12/21/2021    EXCISION OF HEAD LEFT 2ND METATARSAL performed by Agus Bobby DPM at [place] [address]    \"had reconstruction surgery on nose a year after the other nose surgery\"    OTHER SURGICAL HISTORY Right 05/22/2015    I & D right great toe with partial amputation    OTHER SURGICAL HISTORY  12/04/2017    inc and drainage of abcess    SC DEEP INCIS FOOT BONE INFECTN Left 9/22/2018    LEFT FOOT DEBRIDEMENT INCISION AND DRAINAGE TOP AND BOTTOM performed by Tushar Vargas DPM at [facility] N/A 5/15/2021    SCROTAL AND PERINEAL DEBRIDEMENT performed by Myrna Baer MD at [address] [place] [address] 5/16/2021    SCROTAL RE-DEBRIDEMENT  INCISION AND DRAINAGE performed by Myrna Baer MD at [address] [place], [place] [id number]    sebaceous cyst removal times 4     TOE AMPUTATION      right great toe    TOE AMPUTATION Right 3/23/2019    TOE AMPUTATION RIGHT 5TH TOE performed by Tushar Vargas DPM at [facility] [address] Right 8/6/2019    TOE AMPUTATION RIGHT 4TH TOE performed by Tushar Vargas DPM at [facility] [address] ENDOSCOPY  06/2/2011    Mild gastritis with moderatley severe antritis, small hiatal hernia, Dr. KUMAR/ [address] N/A 1/12/2019    EGD BIOPSY performed by Kristina Woodson MD at [facility] Marital status:      Spouse name: Not on file    Number of children: Not on file    Years of education: Not on file    Highest education level: Not on file   Occupational History    Not on file   Tobacco Use    Smoking status: Current Some Day Smoker     Years: 20.00     Types: Cigarettes    Smokeless tobacco: Never Used    Tobacco comment: Smokes when drinking/social   Vaping Use    Vaping Use: Never used   Substance and Sexual Activity    Alcohol use: Yes     Comment: occ    Drug use: Yes     Frequency: 7.0 times per week     Types: Marijuana Lum Olden)     Comment: 12/20/21 @ 2000    Sexual activity: Yes     Partners: Female   Other Topics Concern    Not on file   Social History Narrative    Not on file     Social Determinants of Health     Financial Resource Strain:     Difficulty of Paying Living Expenses: Not on file   Food Insecurity:     Worried About Running Out of Food in the Last Year: Not on file    Gisella of Food in the Last Year: Not on file   Transportation Needs:     Lack of Transportation (Medical): Not on file    Lack of Transportation (Non-Medical):  Not on file   Physical Activity:     Days of Exercise per Week: Not on file    Minutes of Exercise per Session: Not on file   Stress:     Feeling of Stress : Not on file   Social Connections:     Frequency of Communication with Friends and Family: Not on file    Frequency of Social Gatherings with Friends and Family: Not on file    Attends Buddhist Services: Not on file    Active Member of [address] [place] [address] or Organizations: Not on file    Attends Club or Organization Meetings: Not on file    Marital Status: Not on file   Intimate Partner Violence:     Fear of Current or Ex-Partner: Not on file    Emotionally Abused: Not on file    Physically Abused: Not on file    Sexually Abused: Not on file   Housing Stability:     Unable to Pay for Housing in the Last Year: Not on file    Number of [place] in the Last Year: Not on file    Unstable Housing in the Last Year: Not on file       Medications/Allergies     Previous Medications    AMLODIPINE (NORVASC) 10 MG TABLET    Take 1 tablet by mouth daily    APIXABAN (ELIQUIS) 2.5 MG TABS TABLET    Take 1 tablet by mouth 2 times daily    ASPIRIN 81 MG EC TABLET    Take 1 tablet by mouth daily    ATORVASTATIN (LIPITOR) 40 MG TABLET    Take 1 tablet by mouth nightly    CHLORTHALIDONE (HYGROTON) 25 MG TABLET    Take 1 tablet by mouth daily    CLOPIDOGREL (PLAVIX) 75 MG TABLET    Take 1 tablet by mouth daily    FUROSEMIDE (LASIX) 40 MG TABLET    Take 1 tablet by mouth daily    GLUCOSE MONITORING KIT (FREESTYLE) MONITORING KIT    1 each by Does not apply route once for 1 dose. INSULIN ASPART (NOVOLOG RELION) 100 UNIT/ML INJECTION VIAL    Inject 35 Units into the skin 3 times daily (before meals)    INSULIN GLARGINE (LANTUS) 100 UNIT/ML INJECTION VIAL    Inject 75 Units into the skin nightly    LANCETS MISC    1 each by Does not apply route daily    LINAGLIPTIN (TRADJENTA) 5 MG TABLET    Take 1 tablet by mouth daily    METOPROLOL TARTRATE (LOPRESSOR) 25 MG TABLET    Take 1 tablet by mouth 2 times daily    ONDANSETRON (ZOFRAN) 4 MG TABLET    Take 1 tablet by mouth every 8 hours as needed for Nausea or Vomiting    SERTRALINE (ZOLOFT) 50 MG TABLET    Take 2 tablets by mouth daily     Allergies   Allergen Reactions    Adhesive Tape Rash    Doxycycline Nausea And Vomiting    Reglan [Metoclopramide] Anxiety        Physical Exam       ED Triage Vitals [05/24/22 0158]   BP Temp Temp Source Pulse Resp SpO2 Height Weight   (!) 140/87 98.1 °F (36.7 °C) Oral 97 20 97 % -- --     GENERAL APPEARANCE:  Well-developed, well-nourished, no acute distress. HEAD:  NC/AT. EYES:  Sclera anicteric. ENT:  Ears, nose, mouth normal.     NECK:  Supple. LUNGS:   Respirations unlabored. EXTREMITIES:  No acute deformities. SKIN:  Warm and dry. Large tick to the posterior left thigh. NEUROLOGICAL:  Alert and oriented. PSYCHIATRIC:  Normal mood.      Diagnostics     Procedures/EKG:     [facility] Record Number: [id number]  Date: 5/24/2022   Time: 4:26 AM   Room/Bed: ED19/ED-19  Foreign Body Removal Procedure Note  Indication: Tick to the left thigh    Procedure: The foreign body was removed using a hemostat and gentle traction. It had the appearance of a live tick. After the procedure the area was cleansed. The patient's tetanus status was updated with a tetanus booster. The patient tolerated the procedure well. Complications: None    Electronically Signed by: @torey@    ED Course and MDM   -  Patient seen and evaluated in the emergency department. -  Triage and nursing notes reviewed and incorporated. -  Old chart records reviewed and incorporated. -  Supervising physician was Dr. Berkley Pineda. Patient was seen independently. -  Tick removal performed as noted above. Patient elected for tick bite prophylaxis. -  ED medications:  Tdap, Doxycycline  -  Disposition:  Home    In light of current events, I did utilize appropriate PPE (including N95 and surgical face mask, safety glasses, and gloves, as recommended by the health facility/national standard best practice, during my bedside interactions with the patient. Final Impression      1.  Tick bite with subsequent removal of tick              DISPOSITION Decision To Discharge 05/24/2022 04:21:12 AM      [address], [place]   [facility]        [address], [address]  05/24/22 3820

## 2022-05-24 NOTE — ED TRIAGE NOTES
Patient presents to the ER with c/o of a tick removal. Patient states that he is not sure how he got it but it is in his left inner thigh. Patient states that it is swollen & red. Patient is alert & oriented x 4.

## 2022-07-24 ENCOUNTER — HOSPITAL ENCOUNTER (INPATIENT)
Age: 47
LOS: 9 days | Discharge: HOME | DRG: 368 | End: 2022-08-03
Attending: STUDENT IN AN ORGANIZED HEALTH CARE EDUCATION/TRAINING PROGRAM | Admitting: STUDENT IN AN ORGANIZED HEALTH CARE EDUCATION/TRAINING PROGRAM
Payer: MEDICARE

## 2022-07-24 ENCOUNTER — APPOINTMENT (OUTPATIENT)
Dept: ULTRASOUND IMAGING | Age: 47
DRG: 368 | End: 2022-07-24
Payer: MEDICARE

## 2022-07-24 ENCOUNTER — APPOINTMENT (OUTPATIENT)
Dept: CT IMAGING | Age: 47
DRG: 368 | End: 2022-07-24
Payer: MEDICARE

## 2022-07-24 ENCOUNTER — APPOINTMENT (OUTPATIENT)
Dept: GENERAL RADIOLOGY | Age: 47
DRG: 368 | End: 2022-07-24
Payer: MEDICARE

## 2022-07-24 DIAGNOSIS — R77.8 ELEVATED TROPONIN: ICD-10-CM

## 2022-07-24 DIAGNOSIS — N17.9 AKI (ACUTE KIDNEY INJURY) (HCC): ICD-10-CM

## 2022-07-24 DIAGNOSIS — R55 SYNCOPE AND COLLAPSE: Primary | ICD-10-CM

## 2022-07-24 DIAGNOSIS — R73.9 HYPERGLYCEMIA: ICD-10-CM

## 2022-07-24 DIAGNOSIS — E83.51 HYPOCALCEMIA: ICD-10-CM

## 2022-07-24 LAB
ALBUMIN SERPL-MCNC: 2.2 GM/DL (ref 3.4–5)
ALP BLD-CCNC: 86 IU/L (ref 40–128)
ALT SERPL-CCNC: 8 U/L (ref 10–40)
ANION GAP SERPL CALCULATED.3IONS-SCNC: 11 MMOL/L (ref 4–16)
AST SERPL-CCNC: 9 IU/L (ref 15–37)
BASOPHILS ABSOLUTE: 0 K/CU MM
BASOPHILS RELATIVE PERCENT: 0.5 % (ref 0–1)
BILIRUB SERPL-MCNC: 0.2 MG/DL (ref 0–1)
BUN BLDV-MCNC: 70 MG/DL (ref 6–23)
CALCIUM SERPL-MCNC: 6.7 MG/DL (ref 8.3–10.6)
CHLORIDE BLD-SCNC: 100 MMOL/L (ref 99–110)
CO2: 19 MMOL/L (ref 21–32)
CREAT SERPL-MCNC: 6.1 MG/DL (ref 0.9–1.3)
DIFFERENTIAL TYPE: ABNORMAL
EOSINOPHILS ABSOLUTE: 0.2 K/CU MM
EOSINOPHILS RELATIVE PERCENT: 2.5 % (ref 0–3)
GFR AFRICAN AMERICAN: 12 ML/MIN/1.73M2
GFR NON-AFRICAN AMERICAN: 10 ML/MIN/1.73M2
GLUCOSE BLD-MCNC: 384 MG/DL (ref 70–99)
HCT VFR BLD CALC: 26.8 % (ref 42–52)
HEMOGLOBIN: 9.1 GM/DL (ref 13.5–18)
IMMATURE NEUTROPHIL %: 1.2 % (ref 0–0.43)
LYMPHOCYTES ABSOLUTE: 1.4 K/CU MM
LYMPHOCYTES RELATIVE PERCENT: 16.4 % (ref 24–44)
MCH RBC QN AUTO: 30 PG (ref 27–31)
MCHC RBC AUTO-ENTMCNC: 34 % (ref 32–36)
MCV RBC AUTO: 88.4 FL (ref 78–100)
MONOCYTES ABSOLUTE: 0.7 K/CU MM
MONOCYTES RELATIVE PERCENT: 7.8 % (ref 0–4)
NUCLEATED RBC %: 0 %
PDW BLD-RTO: 12.7 % (ref 11.7–14.9)
PLATELET # BLD: 219 K/CU MM (ref 140–440)
PMV BLD AUTO: 10.6 FL (ref 7.5–11.1)
POTASSIUM SERPL-SCNC: 5.2 MMOL/L (ref 3.5–5.1)
PRO-BNP: ABNORMAL PG/ML
RBC # BLD: 3.03 M/CU MM (ref 4.6–6.2)
SEGMENTED NEUTROPHILS ABSOLUTE COUNT: 6.1 K/CU MM
SEGMENTED NEUTROPHILS RELATIVE PERCENT: 71.6 % (ref 36–66)
SODIUM BLD-SCNC: 130 MMOL/L (ref 135–145)
TOTAL IMMATURE NEUTOROPHIL: 0.1 K/CU MM
TOTAL NUCLEATED RBC: 0 K/CU MM
TOTAL PROTEIN: 4.9 GM/DL (ref 6.4–8.2)
TROPONIN T: 0.1 NG/ML
WBC # BLD: 8.5 K/CU MM (ref 4–10.5)

## 2022-07-24 PROCEDURE — 70450 CT HEAD/BRAIN W/O DYE: CPT

## 2022-07-24 PROCEDURE — 2580000003 HC RX 258: Performed by: PHYSICIAN ASSISTANT

## 2022-07-24 PROCEDURE — 96365 THER/PROPH/DIAG IV INF INIT: CPT

## 2022-07-24 PROCEDURE — 93005 ELECTROCARDIOGRAM TRACING: CPT | Performed by: EMERGENCY MEDICINE

## 2022-07-24 PROCEDURE — 96375 TX/PRO/DX INJ NEW DRUG ADDON: CPT

## 2022-07-24 PROCEDURE — 72125 CT NECK SPINE W/O DYE: CPT

## 2022-07-24 PROCEDURE — 81001 URINALYSIS AUTO W/SCOPE: CPT

## 2022-07-24 PROCEDURE — 76775 US EXAM ABDO BACK WALL LIM: CPT

## 2022-07-24 PROCEDURE — 96376 TX/PRO/DX INJ SAME DRUG ADON: CPT

## 2022-07-24 PROCEDURE — 83880 ASSAY OF NATRIURETIC PEPTIDE: CPT

## 2022-07-24 PROCEDURE — 71045 X-RAY EXAM CHEST 1 VIEW: CPT

## 2022-07-24 PROCEDURE — 99285 EMERGENCY DEPT VISIT HI MDM: CPT

## 2022-07-24 PROCEDURE — 74176 CT ABD & PELVIS W/O CONTRAST: CPT

## 2022-07-24 PROCEDURE — 85025 COMPLETE CBC W/AUTO DIFF WBC: CPT

## 2022-07-24 PROCEDURE — 6360000002 HC RX W HCPCS: Performed by: PHYSICIAN ASSISTANT

## 2022-07-24 PROCEDURE — 84484 ASSAY OF TROPONIN QUANT: CPT

## 2022-07-24 PROCEDURE — 80053 COMPREHEN METABOLIC PANEL: CPT

## 2022-07-24 RX ORDER — CALCIUM GLUCONATE 20 MG/ML
2000 INJECTION, SOLUTION INTRAVENOUS ONCE
Status: COMPLETED | OUTPATIENT
Start: 2022-07-24 | End: 2022-07-25

## 2022-07-24 RX ORDER — ASPIRIN 81 MG/1
324 TABLET, CHEWABLE ORAL ONCE
Status: DISCONTINUED | OUTPATIENT
Start: 2022-07-24 | End: 2022-07-24

## 2022-07-24 RX ORDER — 0.9 % SODIUM CHLORIDE 0.9 %
1000 INTRAVENOUS SOLUTION INTRAVENOUS ONCE
Status: COMPLETED | OUTPATIENT
Start: 2022-07-24 | End: 2022-07-25

## 2022-07-24 RX ORDER — ONDANSETRON 2 MG/ML
4 INJECTION INTRAMUSCULAR; INTRAVENOUS EVERY 30 MIN PRN
Status: DISCONTINUED | OUTPATIENT
Start: 2022-07-24 | End: 2022-07-25 | Stop reason: SDUPTHER

## 2022-07-24 RX ORDER — MORPHINE SULFATE 4 MG/ML
4 INJECTION, SOLUTION INTRAMUSCULAR; INTRAVENOUS EVERY 30 MIN PRN
Status: DISCONTINUED | OUTPATIENT
Start: 2022-07-24 | End: 2022-07-25 | Stop reason: ALTCHOICE

## 2022-07-24 RX ORDER — SODIUM CHLORIDE 9 MG/ML
INJECTION, SOLUTION INTRAVENOUS CONTINUOUS
Status: DISCONTINUED | OUTPATIENT
Start: 2022-07-24 | End: 2022-07-25

## 2022-07-24 RX ADMIN — ONDANSETRON 4 MG: 2 INJECTION INTRAMUSCULAR; INTRAVENOUS at 23:33

## 2022-07-24 RX ADMIN — SODIUM CHLORIDE 1000 ML: 9 INJECTION, SOLUTION INTRAVENOUS at 21:40

## 2022-07-24 RX ADMIN — CALCIUM GLUCONATE 2000 MG: 20 INJECTION, SOLUTION INTRAVENOUS at 23:40

## 2022-07-24 RX ADMIN — MORPHINE SULFATE 4 MG: 4 INJECTION, SOLUTION INTRAMUSCULAR; INTRAVENOUS at 23:33

## 2022-07-24 RX ADMIN — MORPHINE SULFATE 4 MG: 4 INJECTION, SOLUTION INTRAMUSCULAR; INTRAVENOUS at 21:38

## 2022-07-24 ASSESSMENT — PAIN - FUNCTIONAL ASSESSMENT: PAIN_FUNCTIONAL_ASSESSMENT: NONE - DENIES PAIN

## 2022-07-25 ENCOUNTER — APPOINTMENT (OUTPATIENT)
Dept: INTERVENTIONAL RADIOLOGY/VASCULAR | Age: 47
DRG: 368 | End: 2022-07-25
Payer: MEDICARE

## 2022-07-25 ENCOUNTER — APPOINTMENT (OUTPATIENT)
Dept: GENERAL RADIOLOGY | Age: 47
DRG: 368 | End: 2022-07-25
Payer: MEDICARE

## 2022-07-25 ENCOUNTER — ANESTHESIA EVENT (OUTPATIENT)
Dept: ENDOSCOPY | Age: 47
DRG: 368 | End: 2022-07-25
Payer: MEDICARE

## 2022-07-25 PROBLEM — D64.9 ANEMIA: Status: ACTIVE | Noted: 2022-07-25

## 2022-07-25 PROBLEM — N17.9 AKI (ACUTE KIDNEY INJURY) (HCC): Status: ACTIVE | Noted: 2022-07-25

## 2022-07-25 LAB
AMORPHOUS: ABNORMAL /HPF
ANION GAP SERPL CALCULATED.3IONS-SCNC: 13 MMOL/L (ref 4–16)
ANION GAP SERPL CALCULATED.3IONS-SCNC: 13 MMOL/L (ref 4–16)
ANION GAP SERPL CALCULATED.3IONS-SCNC: 9 MMOL/L (ref 4–16)
APTT: 30.3 SECONDS (ref 25.1–37.1)
BACTERIA: NEGATIVE /HPF
BILIRUBIN URINE: NEGATIVE MG/DL
BLOOD, URINE: ABNORMAL
BUN BLDV-MCNC: 39 MG/DL (ref 6–23)
BUN BLDV-MCNC: 49 MG/DL (ref 6–23)
BUN BLDV-MCNC: 74 MG/DL (ref 6–23)
CALCIUM SERPL-MCNC: 7.1 MG/DL (ref 8.3–10.6)
CALCIUM SERPL-MCNC: 8.1 MG/DL (ref 8.3–10.6)
CALCIUM SERPL-MCNC: 9.1 MG/DL (ref 8.3–10.6)
CHLORIDE BLD-SCNC: 102 MMOL/L (ref 99–110)
CHLORIDE BLD-SCNC: 103 MMOL/L (ref 99–110)
CHLORIDE BLD-SCNC: 104 MMOL/L (ref 99–110)
CLARITY: CLEAR
CO2: 19 MMOL/L (ref 21–32)
CO2: 20 MMOL/L (ref 21–32)
CO2: 23 MMOL/L (ref 21–32)
COLOR: YELLOW
CREAT SERPL-MCNC: 3.2 MG/DL (ref 0.9–1.3)
CREAT SERPL-MCNC: 4.3 MG/DL (ref 0.9–1.3)
CREAT SERPL-MCNC: 5.9 MG/DL (ref 0.9–1.3)
ESTIMATED AVERAGE GLUCOSE: 240 MG/DL
FERRITIN: 931 NG/ML (ref 30–400)
FOLATE: 7.1 NG/ML (ref 3.1–17.5)
GFR AFRICAN AMERICAN: 12 ML/MIN/1.73M2
GFR AFRICAN AMERICAN: 18 ML/MIN/1.73M2
GFR AFRICAN AMERICAN: 25 ML/MIN/1.73M2
GFR NON-AFRICAN AMERICAN: 10 ML/MIN/1.73M2
GFR NON-AFRICAN AMERICAN: 15 ML/MIN/1.73M2
GFR NON-AFRICAN AMERICAN: 21 ML/MIN/1.73M2
GLUCOSE BLD-MCNC: 146 MG/DL (ref 70–99)
GLUCOSE BLD-MCNC: 147 MG/DL (ref 70–99)
GLUCOSE BLD-MCNC: 160 MG/DL (ref 70–99)
GLUCOSE BLD-MCNC: 161 MG/DL (ref 70–99)
GLUCOSE BLD-MCNC: 253 MG/DL (ref 70–99)
GLUCOSE BLD-MCNC: 270 MG/DL (ref 70–99)
GLUCOSE BLD-MCNC: 303 MG/DL (ref 70–99)
GLUCOSE BLD-MCNC: 319 MG/DL (ref 70–99)
GLUCOSE BLD-MCNC: 377 MG/DL (ref 70–99)
GLUCOSE, URINE: >1000 MG/DL
HBA1C MFR BLD: 10 % (ref 4.2–6.3)
HBV SURFACE AB TITR SER: <3.5 {TITER}
HEPATITIS B SURF AG,XHBAGS: NON REACTIVE
INR BLD: 0.92 INDEX
IRON: 80 UG/DL (ref 59–158)
KETONES, URINE: NEGATIVE MG/DL
LEUKOCYTE ESTERASE, URINE: NEGATIVE
MUCUS: ABNORMAL HPF
NITRITE URINE, QUANTITATIVE: NEGATIVE
PCT TRANSFERRIN: 42 % (ref 10–44)
PH, URINE: 6 (ref 5–8)
POTASSIUM SERPL-SCNC: 4.3 MMOL/L (ref 3.5–5.1)
POTASSIUM SERPL-SCNC: 5.1 MMOL/L (ref 3.5–5.1)
POTASSIUM SERPL-SCNC: 6.1 MMOL/L (ref 3.5–5.1)
PREALBUMIN: 29 MG/DL (ref 20–40)
PROTEIN UA: >300 MG/DL
PROTHROMBIN TIME: 11.8 SECONDS (ref 11.7–14.5)
RBC URINE: 1 /HPF (ref 0–3)
REASON FOR REJECTION: NORMAL
REASON FOR REJECTION: NORMAL
REJECTED TEST: NORMAL
REJECTED TEST: NORMAL
SODIUM BLD-SCNC: 135 MMOL/L (ref 135–145)
SODIUM BLD-SCNC: 135 MMOL/L (ref 135–145)
SODIUM BLD-SCNC: 136 MMOL/L (ref 135–145)
SPECIFIC GRAVITY UA: 1.02 (ref 1–1.03)
SQUAMOUS EPITHELIAL: <1 /HPF
TOTAL IRON BINDING CAPACITY: 190 UG/DL (ref 250–450)
TRICHOMONAS: ABNORMAL /HPF
TROPONIN T: 0.1 NG/ML
TSH HIGH SENSITIVITY: 3.86 UIU/ML (ref 0.27–4.2)
UNSATURATED IRON BINDING CAPACITY: 110 UG/DL (ref 110–370)
UROBILINOGEN, URINE: 0.2 MG/DL (ref 0.2–1)
VITAMIN B-12: 471 PG/ML (ref 211–911)
WBC UA: 2 /HPF (ref 0–2)

## 2022-07-25 PROCEDURE — 6360000002 HC RX W HCPCS: Performed by: STUDENT IN AN ORGANIZED HEALTH CARE EDUCATION/TRAINING PROGRAM

## 2022-07-25 PROCEDURE — 76937 US GUIDE VASCULAR ACCESS: CPT

## 2022-07-25 PROCEDURE — 87340 HEPATITIS B SURFACE AG IA: CPT

## 2022-07-25 PROCEDURE — 6360000002 HC RX W HCPCS: Performed by: INTERNAL MEDICINE

## 2022-07-25 PROCEDURE — 85730 THROMBOPLASTIN TIME PARTIAL: CPT

## 2022-07-25 PROCEDURE — 83970 ASSAY OF PARATHORMONE: CPT

## 2022-07-25 PROCEDURE — 2500000003 HC RX 250 WO HCPCS: Performed by: STUDENT IN AN ORGANIZED HEALTH CARE EDUCATION/TRAINING PROGRAM

## 2022-07-25 PROCEDURE — 84443 ASSAY THYROID STIM HORMONE: CPT

## 2022-07-25 PROCEDURE — 82962 GLUCOSE BLOOD TEST: CPT

## 2022-07-25 PROCEDURE — 84484 ASSAY OF TROPONIN QUANT: CPT

## 2022-07-25 PROCEDURE — 83935 ASSAY OF URINE OSMOLALITY: CPT

## 2022-07-25 PROCEDURE — C9113 INJ PANTOPRAZOLE SODIUM, VIA: HCPCS | Performed by: NURSE PRACTITIONER

## 2022-07-25 PROCEDURE — 94761 N-INVAS EAR/PLS OXIMETRY MLT: CPT

## 2022-07-25 PROCEDURE — 6370000000 HC RX 637 (ALT 250 FOR IP): Performed by: STUDENT IN AN ORGANIZED HEALTH CARE EDUCATION/TRAINING PROGRAM

## 2022-07-25 PROCEDURE — 99223 1ST HOSP IP/OBS HIGH 75: CPT | Performed by: PSYCHIATRY & NEUROLOGY

## 2022-07-25 PROCEDURE — 36556 INSERT NON-TUNNEL CV CATH: CPT

## 2022-07-25 PROCEDURE — 6370000000 HC RX 637 (ALT 250 FOR IP): Performed by: INTERNAL MEDICINE

## 2022-07-25 PROCEDURE — 02H633Z INSERTION OF INFUSION DEVICE INTO RIGHT ATRIUM, PERCUTANEOUS APPROACH: ICD-10-PCS | Performed by: RADIOLOGY

## 2022-07-25 PROCEDURE — 86706 HEP B SURFACE ANTIBODY: CPT

## 2022-07-25 PROCEDURE — 90935 HEMODIALYSIS ONE EVALUATION: CPT

## 2022-07-25 PROCEDURE — 84134 ASSAY OF PREALBUMIN: CPT

## 2022-07-25 PROCEDURE — 82570 ASSAY OF URINE CREATININE: CPT

## 2022-07-25 PROCEDURE — 82607 VITAMIN B-12: CPT

## 2022-07-25 PROCEDURE — 83036 HEMOGLOBIN GLYCOSYLATED A1C: CPT

## 2022-07-25 PROCEDURE — 84300 ASSAY OF URINE SODIUM: CPT

## 2022-07-25 PROCEDURE — 82746 ASSAY OF FOLIC ACID SERUM: CPT

## 2022-07-25 PROCEDURE — 83540 ASSAY OF IRON: CPT

## 2022-07-25 PROCEDURE — 36415 COLL VENOUS BLD VENIPUNCTURE: CPT

## 2022-07-25 PROCEDURE — 80048 BASIC METABOLIC PNL TOTAL CA: CPT

## 2022-07-25 PROCEDURE — 93005 ELECTROCARDIOGRAM TRACING: CPT | Performed by: STUDENT IN AN ORGANIZED HEALTH CARE EDUCATION/TRAINING PROGRAM

## 2022-07-25 PROCEDURE — 85610 PROTHROMBIN TIME: CPT

## 2022-07-25 PROCEDURE — 6360000002 HC RX W HCPCS: Performed by: NURSE PRACTITIONER

## 2022-07-25 PROCEDURE — 2580000003 HC RX 258: Performed by: STUDENT IN AN ORGANIZED HEALTH CARE EDUCATION/TRAINING PROGRAM

## 2022-07-25 PROCEDURE — 6370000000 HC RX 637 (ALT 250 FOR IP): Performed by: PHYSICIAN ASSISTANT

## 2022-07-25 PROCEDURE — 82728 ASSAY OF FERRITIN: CPT

## 2022-07-25 PROCEDURE — 2580000003 HC RX 258: Performed by: INTERNAL MEDICINE

## 2022-07-25 PROCEDURE — 1200000000 HC SEMI PRIVATE

## 2022-07-25 PROCEDURE — 71045 X-RAY EXAM CHEST 1 VIEW: CPT

## 2022-07-25 PROCEDURE — 86704 HEP B CORE ANTIBODY TOTAL: CPT

## 2022-07-25 PROCEDURE — 83550 IRON BINDING TEST: CPT

## 2022-07-25 PROCEDURE — 2580000003 HC RX 258: Performed by: PHYSICIAN ASSISTANT

## 2022-07-25 RX ORDER — DEXTROSE MONOHYDRATE 100 MG/ML
INJECTION, SOLUTION INTRAVENOUS CONTINUOUS PRN
Status: DISCONTINUED | OUTPATIENT
Start: 2022-07-25 | End: 2022-08-03 | Stop reason: HOSPADM

## 2022-07-25 RX ORDER — 0.9 % SODIUM CHLORIDE 0.9 %
500 INTRAVENOUS SOLUTION INTRAVENOUS ONCE
Status: COMPLETED | OUTPATIENT
Start: 2022-07-25 | End: 2022-07-25

## 2022-07-25 RX ORDER — PANTOPRAZOLE SODIUM 40 MG/10ML
40 INJECTION, POWDER, LYOPHILIZED, FOR SOLUTION INTRAVENOUS 2 TIMES DAILY
Status: DISCONTINUED | OUTPATIENT
Start: 2022-07-25 | End: 2022-07-31

## 2022-07-25 RX ORDER — CALCITRIOL 0.25 UG/1
0.25 CAPSULE, LIQUID FILLED ORAL DAILY
Status: DISCONTINUED | OUTPATIENT
Start: 2022-07-25 | End: 2022-08-03 | Stop reason: HOSPADM

## 2022-07-25 RX ORDER — INSULIN GLARGINE 100 [IU]/ML
30 INJECTION, SOLUTION SUBCUTANEOUS NIGHTLY
Status: DISCONTINUED | OUTPATIENT
Start: 2022-07-25 | End: 2022-07-28

## 2022-07-25 RX ORDER — INSULIN LISPRO 100 [IU]/ML
0-8 INJECTION, SOLUTION INTRAVENOUS; SUBCUTANEOUS
Status: DISCONTINUED | OUTPATIENT
Start: 2022-07-25 | End: 2022-07-26

## 2022-07-25 RX ORDER — AMLODIPINE BESYLATE 10 MG/1
10 TABLET ORAL DAILY
Status: DISCONTINUED | OUTPATIENT
Start: 2022-07-25 | End: 2022-07-25

## 2022-07-25 RX ORDER — INSULIN GLARGINE 100 [IU]/ML
10 INJECTION, SOLUTION SUBCUTANEOUS NIGHTLY
Status: DISCONTINUED | OUTPATIENT
Start: 2022-07-25 | End: 2022-07-25

## 2022-07-25 RX ORDER — SODIUM CHLORIDE 9 MG/ML
INJECTION, SOLUTION INTRAVENOUS PRN
Status: DISCONTINUED | OUTPATIENT
Start: 2022-07-25 | End: 2022-08-03 | Stop reason: HOSPADM

## 2022-07-25 RX ORDER — ACETAMINOPHEN 325 MG/1
650 TABLET ORAL EVERY 6 HOURS PRN
Status: DISCONTINUED | OUTPATIENT
Start: 2022-07-25 | End: 2022-08-03 | Stop reason: HOSPADM

## 2022-07-25 RX ORDER — POLYETHYLENE GLYCOL 3350 17 G/17G
17 POWDER, FOR SOLUTION ORAL DAILY PRN
Status: DISCONTINUED | OUTPATIENT
Start: 2022-07-25 | End: 2022-08-03 | Stop reason: HOSPADM

## 2022-07-25 RX ORDER — BUMETANIDE 0.25 MG/ML
2 INJECTION INTRAMUSCULAR; INTRAVENOUS ONCE
Status: COMPLETED | OUTPATIENT
Start: 2022-07-25 | End: 2022-07-25

## 2022-07-25 RX ORDER — SODIUM CHLORIDE 0.9 % (FLUSH) 0.9 %
5-40 SYRINGE (ML) INJECTION EVERY 12 HOURS SCHEDULED
Status: DISCONTINUED | OUTPATIENT
Start: 2022-07-25 | End: 2022-08-03 | Stop reason: HOSPADM

## 2022-07-25 RX ORDER — MORPHINE SULFATE 2 MG/ML
2 INJECTION, SOLUTION INTRAMUSCULAR; INTRAVENOUS ONCE
Status: COMPLETED | OUTPATIENT
Start: 2022-07-25 | End: 2022-07-25

## 2022-07-25 RX ORDER — INSULIN LISPRO 100 [IU]/ML
0-4 INJECTION, SOLUTION INTRAVENOUS; SUBCUTANEOUS
Status: DISCONTINUED | OUTPATIENT
Start: 2022-07-25 | End: 2022-07-25

## 2022-07-25 RX ORDER — CALCIUM GLUCONATE 20 MG/ML
1000 INJECTION, SOLUTION INTRAVENOUS ONCE
Status: COMPLETED | OUTPATIENT
Start: 2022-07-25 | End: 2022-07-25

## 2022-07-25 RX ORDER — DEXTROSE MONOHYDRATE 25 G/50ML
25 INJECTION, SOLUTION INTRAVENOUS ONCE
Status: COMPLETED | OUTPATIENT
Start: 2022-07-25 | End: 2022-07-25

## 2022-07-25 RX ORDER — ACETAMINOPHEN 650 MG/1
650 SUPPOSITORY RECTAL EVERY 6 HOURS PRN
Status: DISCONTINUED | OUTPATIENT
Start: 2022-07-25 | End: 2022-08-03 | Stop reason: HOSPADM

## 2022-07-25 RX ORDER — ATORVASTATIN CALCIUM 40 MG/1
40 TABLET, FILM COATED ORAL NIGHTLY
Status: DISCONTINUED | OUTPATIENT
Start: 2022-07-25 | End: 2022-08-03 | Stop reason: HOSPADM

## 2022-07-25 RX ORDER — INSULIN LISPRO 100 [IU]/ML
0-4 INJECTION, SOLUTION INTRAVENOUS; SUBCUTANEOUS NIGHTLY
Status: DISCONTINUED | OUTPATIENT
Start: 2022-07-25 | End: 2022-07-25

## 2022-07-25 RX ORDER — PROMETHAZINE HYDROCHLORIDE 25 MG/ML
12.5 INJECTION, SOLUTION INTRAMUSCULAR; INTRAVENOUS
Status: COMPLETED | OUTPATIENT
Start: 2022-07-25 | End: 2022-07-25

## 2022-07-25 RX ORDER — ONDANSETRON 2 MG/ML
4 INJECTION INTRAMUSCULAR; INTRAVENOUS EVERY 6 HOURS PRN
Status: DISCONTINUED | OUTPATIENT
Start: 2022-07-25 | End: 2022-08-03 | Stop reason: HOSPADM

## 2022-07-25 RX ORDER — SODIUM CHLORIDE 0.9 % (FLUSH) 0.9 %
5-40 SYRINGE (ML) INJECTION PRN
Status: DISCONTINUED | OUTPATIENT
Start: 2022-07-25 | End: 2022-08-03 | Stop reason: HOSPADM

## 2022-07-25 RX ORDER — INSULIN LISPRO 100 [IU]/ML
15 INJECTION, SOLUTION INTRAVENOUS; SUBCUTANEOUS
Status: DISCONTINUED | OUTPATIENT
Start: 2022-07-25 | End: 2022-07-28

## 2022-07-25 RX ORDER — INSULIN LISPRO 100 [IU]/ML
5 INJECTION, SOLUTION INTRAVENOUS; SUBCUTANEOUS
Status: DISCONTINUED | OUTPATIENT
Start: 2022-07-25 | End: 2022-07-25

## 2022-07-25 RX ORDER — HEPARIN SODIUM 5000 [USP'U]/ML
5000 INJECTION, SOLUTION INTRAVENOUS; SUBCUTANEOUS EVERY 8 HOURS SCHEDULED
Status: DISCONTINUED | OUTPATIENT
Start: 2022-07-25 | End: 2022-07-26

## 2022-07-25 RX ORDER — ONDANSETRON 4 MG/1
4 TABLET, ORALLY DISINTEGRATING ORAL EVERY 8 HOURS PRN
Status: DISCONTINUED | OUTPATIENT
Start: 2022-07-25 | End: 2022-08-03 | Stop reason: HOSPADM

## 2022-07-25 RX ADMIN — SERTRALINE HYDROCHLORIDE 100 MG: 50 TABLET ORAL at 10:08

## 2022-07-25 RX ADMIN — AMLODIPINE BESYLATE 10 MG: 10 TABLET ORAL at 10:13

## 2022-07-25 RX ADMIN — PANTOPRAZOLE SODIUM 40 MG: 40 INJECTION, POWDER, FOR SOLUTION INTRAVENOUS at 21:26

## 2022-07-25 RX ADMIN — SODIUM CHLORIDE 500 ML: 9 INJECTION, SOLUTION INTRAVENOUS at 10:06

## 2022-07-25 RX ADMIN — CALCIUM GLUCONATE 1000 MG: 20 INJECTION, SOLUTION INTRAVENOUS at 10:55

## 2022-07-25 RX ADMIN — PROMETHAZINE HYDROCHLORIDE 12.5 MG: 25 INJECTION INTRAMUSCULAR; INTRAVENOUS at 15:31

## 2022-07-25 RX ADMIN — SODIUM CHLORIDE: 9 INJECTION, SOLUTION INTRAVENOUS at 02:12

## 2022-07-25 RX ADMIN — ONDANSETRON 4 MG: 4 TABLET, ORALLY DISINTEGRATING ORAL at 10:10

## 2022-07-25 RX ADMIN — INSULIN HUMAN 10 UNITS: 100 INJECTION, SOLUTION PARENTERAL at 10:51

## 2022-07-25 RX ADMIN — INSULIN HUMAN 10 UNITS: 100 INJECTION, SOLUTION PARENTERAL at 02:09

## 2022-07-25 RX ADMIN — INSULIN LISPRO 15 UNITS: 100 INJECTION, SOLUTION INTRAVENOUS; SUBCUTANEOUS at 12:51

## 2022-07-25 RX ADMIN — SODIUM CHLORIDE, PRESERVATIVE FREE 10 ML: 5 INJECTION INTRAVENOUS at 10:07

## 2022-07-25 RX ADMIN — MORPHINE SULFATE 2 MG: 2 INJECTION, SOLUTION INTRAMUSCULAR; INTRAVENOUS at 10:42

## 2022-07-25 RX ADMIN — EPOETIN ALFA-EPBX 10000 UNITS: 10000 INJECTION, SOLUTION INTRAVENOUS; SUBCUTANEOUS at 15:31

## 2022-07-25 RX ADMIN — CALCITRIOL CAPSULES 0.25 MCG 0.25 MCG: 0.25 CAPSULE ORAL at 10:08

## 2022-07-25 RX ADMIN — ACETAMINOPHEN 650 MG: 325 TABLET ORAL at 21:26

## 2022-07-25 RX ADMIN — BUMETANIDE 2 MG: 0.25 INJECTION INTRAMUSCULAR; INTRAVENOUS at 10:43

## 2022-07-25 RX ADMIN — ATORVASTATIN CALCIUM 40 MG: 40 TABLET, FILM COATED ORAL at 21:26

## 2022-07-25 RX ADMIN — ACETAMINOPHEN 650 MG: 325 TABLET ORAL at 10:07

## 2022-07-25 RX ADMIN — INSULIN LISPRO 8 UNITS: 100 INJECTION, SOLUTION INTRAVENOUS; SUBCUTANEOUS at 12:51

## 2022-07-25 RX ADMIN — INSULIN GLARGINE 30 UNITS: 100 INJECTION, SOLUTION SUBCUTANEOUS at 21:02

## 2022-07-25 RX ADMIN — METOPROLOL TARTRATE 25 MG: 25 TABLET, FILM COATED ORAL at 10:07

## 2022-07-25 RX ADMIN — DEXTROSE MONOHYDRATE 25 G: 25 INJECTION, SOLUTION INTRAVENOUS at 10:50

## 2022-07-25 RX ADMIN — HEPARIN SODIUM 5000 UNITS: 5000 INJECTION INTRAVENOUS; SUBCUTANEOUS at 21:26

## 2022-07-25 RX ADMIN — ONDANSETRON 4 MG: 2 INJECTION INTRAMUSCULAR; INTRAVENOUS at 10:28

## 2022-07-25 RX ADMIN — HEPARIN SODIUM 5000 UNITS: 5000 INJECTION INTRAVENOUS; SUBCUTANEOUS at 10:07

## 2022-07-25 ASSESSMENT — PAIN DESCRIPTION - LOCATION
LOCATION: ABDOMEN;FOOT;LEG
LOCATION: HEAD

## 2022-07-25 ASSESSMENT — PAIN DESCRIPTION - DESCRIPTORS
DESCRIPTORS: ACHING;DISCOMFORT
DESCRIPTORS: JABBING

## 2022-07-25 ASSESSMENT — PAIN DESCRIPTION - ORIENTATION: ORIENTATION: RIGHT;LEFT;LOWER

## 2022-07-25 ASSESSMENT — PAIN SCALES - GENERAL
PAINLEVEL_OUTOF10: 7
PAINLEVEL_OUTOF10: 6
PAINLEVEL_OUTOF10: 2
PAINLEVEL_OUTOF10: 2
PAINLEVEL_OUTOF10: 4
PAINLEVEL_OUTOF10: 8
PAINLEVEL_OUTOF10: 6

## 2022-07-25 NOTE — ED TRIAGE NOTES
Patient to the ED from home with c/o syncope. Patient began to feel dizzy with fluttering in chest. Patient did fall during episode with loss of bowel control. Patient did not hit head and is not currently on blood thinners. Patient estimates they were unconscious for 1-2 minutes. Patient is alert and oriented upon arrival VS stable.

## 2022-07-25 NOTE — ED PROVIDER NOTES
Triage Chief Complaint:   Loss of Consciousness (Did not hit head, no blood thinners)    Resighini:  Today in the ED I had the pleasure of caring for Brando Orona who is a 52 y.o. male that presents today to the emergency department. Complaining of loss of consciousness. Context is patient states she has been feeling pretty fatigued and sleeping a lot over the last 3 weeks. He began having mid epigastric abdominal pain x2 days ago. Nonradiating. He has been eating and drinking eliminating baseline. Denies any paresthesias or headache change in vision dizziness confusion no flank pain no back pain no urinary symptoms. Patient states he was sitting on the couch. And then he stood up to walk into the kitchen. Took about 4 steps and got very very lightheaded  You know he is waking up on the ground. States he had a large dark bowel movement (lost control of bowel function). Patient was only down time several minutes. As his wife was home. This was called he was brought here to the ED. He denies any lightheadedness or dizziness at this time. But does have persistent midepigastric abdominal pain. Sharp in nature. ROS:  REVIEW OF SYSTEMS    At least 10 systems reviewed      All other review of systems are negative  See HPI and nursing notes for additional information       Past Medical History:   Diagnosis Date    Abscess 2010    scrotal    Acute renal failure (ARF) ([address] 08/04/2019    Anxiety associated with depression     Anxiety associated with depression     Back pain 07/02/2012    Chest pain 05/01/2013    Diabetic nephropathy ([address]     Diabetic neuropathy ([address] 12/22/2011    Diabetic ulcer of left midfoot associated with type 2 diabetes mellitus, with fat layer exposed ([address] 07/18/2017    Diverticulosis     C scope + Dr. Joyce Garcia    DM (diabetes mellitus), type 2 ([address] 2002    DR. 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 Poli Haley    Necrotizing fasciitis [facility])     Nose fracture 1988    Panic attacks     Pericarditis 2003    Hospitalized with s/p heart cath normal    Peripheral autonomic neuropathy due to diabetes mellitus ([facility]     Axonal EMG- NCS, March 2011    Sebaceous cyst 09/01/2011    URI (upper respiratory infection) 02/27/2012    WD-Wound, surgical, infected, initial encounter 12/08/2017    Wrist fracture 1986     Past Surgical History:   Procedure Laterality Date    ABDOMEN SURGERY      CARDIAC CATHETERIZATION  2003, 2013    Normal (Dr. Yahir Contreras)    COLONOSCOPY  6/2/2011    Pandiverticulosis, Nonbleeding internal hemorrhoids, Repeat colonoscopy at age 48- Dr. Liat Crawford Left 12/21/2021    Marianela Reyez performed by Jake Burns DPM at [address] [facility]    \"had reconstruction surgery on nose a year after the other nose surgery\"    OTHER SURGICAL HISTORY Right 05/22/2015    I & D right great toe with partial amputation    OTHER SURGICAL HISTORY  12/04/2017    inc and drainage of abcess    MS DEEP INCIS FOOT BONE INFECTN Left 9/22/2018    LEFT FOOT DEBRIDEMENT INCISION AND DRAINAGE TOP AND BOTTOM performed by Migue Alonso DPM at [place] [address] 5/15/2021    SCROTAL AND PERINEAL DEBRIDEMENT performed by Guido Felix MD at [place] [address] 5/16/2021    SCROTAL RE-DEBRIDEMENT  INCISION AND DRAINAGE performed by Guido Felix MD at [address] [place] [address] [id number]    sebaceous cyst removal times 4     TOE AMPUTATION      right great toe    TOE AMPUTATION Right 3/23/2019    TOE AMPUTATION RIGHT 5TH TOE performed by Migue Alonso DPM at [address] Right 8/6/2019    TOE AMPUTATION RIGHT 4TH TOE performed by Migue Alonso DPM at [place] [address] GASTROINTESTINAL ENDOSCOPY  06/2/2011    Mild gastritis with moderatley severe antritis, small hiatal hernia, Dr. Filemon Alonzo 1/12/2019    EGD BIOPSY performed by Alvarado Gooden MD at [address] [facility] ENDOSCOPY     Family History   Problem Relation Age of Onset    Cancer Mother         ?site    Stroke Mother     Bleeding Prob Mother     Diabetes Father     Heart Disease Father     High Blood Pressure Father     Obesity Father     Kidney Disease Father     Diabetes Sister     High Blood Pressure Sister     Mental Illness Sister     Obesity Sister     High Blood Pressure Other     Mental Illness Other         bipolar    Other Son         cyst in ear canal    ADHD Daughter      Social History     Socioeconomic History    Marital status:      Spouse name: Not on file    Number of children: Not on file    Years of education: Not on file    Highest education level: Not on file   Occupational History    Not on file   Tobacco Use    Smoking status: Some Days     Years: 20.00     Types: Cigarettes    Smokeless tobacco: Never    Tobacco comments:     Smokes when drinking/social   Vaping Use    Vaping Use: Never used   Substance and Sexual Activity    Alcohol use: Not Currently     Comment: occ    Drug use: Yes     Frequency: 7.0 times per week     Types: Marijuana Frances Ramesh)     Comment: 12/20/21 @ 2000    Sexual activity: Yes     Partners: Female   Other Topics Concern    Not on file   Social History Narrative    Not on file     Social Determinants of Health     Financial Resource Strain: Not on file   Food Insecurity: Not on file   Transportation Needs: Not on file   Physical Activity: Not on file   Stress: Not on file   Social Connections: Not on file   Intimate Partner Violence: Not on file   Housing Stability: Not on file     No current facility-administered medications for this encounter.      Current Outpatient Medications   Medication Sig Dispense Refill    apixaban (ELIQUIS) 2.5 MG TABS tablet Take 1 tablet by mouth 2 times daily 60 tablet 5    clopidogrel (PLAVIX) 75 MG tablet Take 1 tablet by mouth daily 30 tablet 3    furosemide (LASIX) 40 MG tablet Take 1 tablet by mouth daily 30 tablet 1    insulin glargine (LANTUS) 100 UNIT/ML injection vial Inject 75 Units into the skin nightly      insulin aspart (NOVOLOG RELION) 100 UNIT/ML injection vial Inject 35 Units into the skin 3 times daily (before meals)      atorvastatin (LIPITOR) 40 MG tablet Take 1 tablet by mouth nightly 30 tablet 3    metoprolol tartrate (LOPRESSOR) 25 MG tablet Take 1 tablet by mouth 2 times daily 60 tablet 3    amLODIPine (NORVASC) 10 MG tablet Take 1 tablet by mouth daily 30 tablet 3    chlorthalidone (HYGROTON) 25 MG tablet Take 1 tablet by mouth daily 30 tablet 3    sertraline (ZOLOFT) 50 MG tablet Take 2 tablets by mouth daily 30 tablet 3    aspirin 81 MG EC tablet Take 1 tablet by mouth daily 30 tablet 3    linagliptin (TRADJENTA) 5 MG tablet Take 1 tablet by mouth daily 30 tablet 5    ondansetron (ZOFRAN) 4 MG tablet Take 1 tablet by mouth every 8 hours as needed for Nausea or Vomiting 20 tablet 0    Lancets MISC 1 each by Does not apply route daily 100 each 3    glucose monitoring kit (FREESTYLE) monitoring kit 1 each by Does not apply route once for 1 dose. 1 kit 0     Allergies   Allergen Reactions    Adhesive Tape Rash    Doxycycline Nausea And Vomiting    Reglan [Metoclopramide] Anxiety       Nursing Notes Reviewed    Physical Exam:  ED Triage Vitals   Enc Vitals Group      BP 07/24/22 1943 (!) 158/103      Heart Rate 07/24/22 1943 97      Resp 07/24/22 1943 18      Temp 07/24/22 1943 98 °F (36.7 °C)      Temp Source 07/24/22 1943 Oral      SpO2 07/24/22 1943 100 %      Weight 07/24/22 1959 300 lb (136.1 kg)      Height 07/24/22 1959 5' 9\" (1.753 m)      Head Circumference --       Peak Flow --       Pain Score --       Pain Loc --       Pain Edu? --       Excl.  in [address]? --      General :Patient is awake alert oriented person place and time no acute distress nontoxic appearing  HEENT: Pupils are equally round and reactive to light extraocular motors are intact conjunctivae clear sclerae white there is no injection no icterus. Nose without any rhinorrhea or epistaxis. Oral mucosa is moist no exudate buccal mucosa shows no ulcerations. Uvula is midline    Neck: Neck is supple full range of motion trachea midline thyroid nonpalpable  Cardiac: Heart regular rate rhythm no murmurs rubs clicks or gallops  Lungs: Lungs are clear to auscultation there is no wheezing rhonchi or rales. There is no use of accessory muscles no nasal flaring identified. Chest wall: There is no tenderness to palpation over the chest wall or over ribs  Abdomen: Abdomen is soft nontender nondistended. There is no firm or pulsatile masses no rebound rigidity or guarding negative Carl's negative McBurney, no peritoneal signs  Suprapubic:  there is no tenderness to palpation over the external bladder   Musculoskeletal: 5 out of 5 strength in all 4 extremities full flexion extension abduction and adduction supination pronation of all extremities and all digits. No obvious muscle atrophy is noted. No focal muscle deficits are appreciated  Dermatology: Skin is warm and dry there is no obvious abscesses lacerations or lesions noted  Psych: Mentation is grossly normal cognition is grossly normal. Affect is appropriate  Neuro: A&Ox4. GCS 15. Cranial nerves 2-12 grossly intact, PEERLA, EOMs intact, Short and long term memory intact, Pt shows good judgement, follows command well, carries on logical conversation. No ataxia with finger to nose.  strength full bilateral.  UE, LE, Facial sensation full and equal bilateral, no cerebellar dysfunction, negative motor drift. Bicep, Triceps,  strength full and symmetrical. LE DTRs full and symmetrical. Sharp and dull sensation in distal extremities present.         I have reviewed and interpreted all of the If calcium was replaced as calcium was only 6.7. Given patient was slightly hypoglycemic. Hyper kalemia. Subcu insulin was provided. Given patient's multiple metabolic deficiencies. Will require admission to the hospital.  CT scan abdomen pelvis C-spine and head were ordered. Renal ultrasound ordered by nephrology. X-ray ordered as well. None of which show any acute processes. I independently managed patient today in the ED.  BP (!) 159/105   Pulse 95   Temp 98 °F (36.7 °C) (Oral)   Resp 18   Ht 5' 9\" (1.753 m)   Wt 300 lb (136.1 kg)   SpO2 99%   BMI 44.30 kg/m²       Clinical Impression:  1. Syncope and collapse    2. CARMEN (acute kidney injury) ([address]    3. Hypocalcemia    4. Hyperglycemia    5. Elevated troponin          Disposition referral (if applicable):  No follow-up provider specified. Disposition medications (if applicable):  New Prescriptions    No medications on file         Comment: Please note this report has been produced using speech recognition software and may contain errors related to that system including errors in grammar, punctuation, and spelling, as well as words and phrases that may be inappropriate. If there are any questions or concerns please feel free to contact the dictating provider for clarification.       Nathalie Osei, [address] [place] [address]  07/26/22 [id number]

## 2022-07-25 NOTE — PROGRESS NOTES
Admission 4 EYES Skin Assessment completed upon arrival to Unit. This Nurse and Willis Muniz RN completed a skin assessment that was WNL. No skin concerns.

## 2022-07-25 NOTE — CONSULTS
Nephrology Service Consultation      [address] [place] [address]  Phone: [phone]  Office Hours: 8:30AM - 4:30PM  Monday - Friday              MDM//Assessment and Recommendations   -CARMEN on CKD4: cr 6 from 2 few months ago  -Hyperkalemia  -Hyponatremia  -Nephrotic range proteinuria from diabetic kidney dz: already had work up in 2021 for this  -Hypocalcemia likely from tertiary [address] in this pt with advanced CKD  -Presyncope and melena    Plan:  -I suspect that the elevated creat is more from CKD progression and he may need HD initiation  -Awaiting today's BMP  -The hypercalcemia which is likely from tertiary hyperparathyroidism is an indication that this is ilkely CKD progression  -Stop NS for now since probnp elevated  -Start epogen  -Start calcitriol  -GI eval for the melena  -Will follow  -I discussed HD initiation if labs do not improve and he is agreeable    Thank you      Addendum: K is 6, will go ahead and start hd//medical therapy for hyperkalemia for now  Thx   Patient Active Problem List    Diagnosis Date Noted    CARMEN (acute kidney injury) ([address] 07/25/2022    Recurrent major depressive disorder, in full remission ([address] 05/18/2021    Generalized anxiety disorder 05/18/2021    Surgical wound dehiscence 02/08/2022    Chest pain 12/21/2021    Other proteinuria     FH: CAD (coronary artery disease) 09/29/2021    ASCVD (arteriosclerotic cardiovascular disease) 09/29/2021    Chronic kidney disease, stage IV (severe) ([address] 09/28/2021    Nephrotic syndrome 09/22/2021    Generalized edema 09/22/2021    Stage 3b chronic kidney disease ([address] 09/22/2021    Diabetic nephropathy associated with type 2 diabetes mellitus ([address] 09/22/2021    Hypoalbuminemia 09/22/2021    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 type 2 diabetes mellitus, with muscle involvement without evidence of necrosis ([address] 09/21/2021    Right groin wound 06/09/2021    Ulcer of right groin with fat layer exposed ([facility]     Syncope and collapse 06/05/2021    Necrotizing fasciitis ([facility] 05/24/2021    Morbid obesity with body mass index (BMI) of 40.0 to 44.9 in adult [facility]) 05/21/2021    Irene gangrene     Cellulitis, scrotum 05/13/2021    Acute epididymitis     Cellulitis of left arm 04/03/2021    Cellulitis 03/23/2021    Acute renal failure (ARF) ([address] 08/04/2019    Diabetic foot infection ([address] 03/21/2019    Intractable nausea and vomiting 01/11/2019    Uncontrolled type 2 diabetes mellitus ([address] 01/11/2019    Nausea and vomiting 01/11/2019    Constipation 10/07/2018    Cellulitis of left foot     Sepsis ([address] 83/68/2085    Periumbilical hernia     Abscess 12/03/2017    Osteomyelitis ([facility] 05/22/2015    Bronchitis 10/15/2013    Hyperlipidemia with target LDL less than 100 07/15/2013    Essential hypertension 07/15/2013    Bilateral carpal tunnel syndrome- left worse than right 06/24/2013    DANIELA (obstructive sleep apnea) 06/20/2013    Urinary frequency 06/20/2013    Neck pain 05/16/2013    Anxiety associated with depression     Panic attack 02/01/2012    Diabetic neuropathy ([facility] 12/22/2011    Hiatal hernia 02/04/2011    Diabetes mellitus type 2, improved controlled 02/04/2011         Patient:  Vernette Opitz  MRN: [id number]  Consulting physician:  Nini Almanzar MD  Reason for Consult: cr 6     PCP: Kevin Randle MD    HISTORY OF PRESENT ILLNESS:   The patient is a 52 y.o. male with DM2, CAD, CKD4 presented due to melena and presyncope. Renal consult for creat 6. He is an office pt of Dr Douglas Rojas and has been lost to follow up. He denies diarrhea but reports vomiting. He denies any new meds. He reports making urine  He is on room air  Hemodynamically stable  He received NS boluses and maintenance IVF yesterday    REVIEW OF SYSTEMS:  14 point ROS is Negative.  See positive ROS per HPI    Past Medical History:        Diagnosis Date    Abscess 2010    scrotal    Acute [facility] failure (ARF) [facility] 08/04/2019 Anxiety associated with depression     Anxiety associated with depression     Back pain 07/02/2012    Chest pain 05/01/2013    Diabetic nephropathy ([address]     Diabetic neuropathy ([address] 12/22/2011    Diabetic ulcer of left midfoot associated with type 2 diabetes mellitus, with fat layer exposed ([address] 07/18/2017    Diverticulosis     C scope + Dr. Joyce Garcia    DM (diabetes mellitus), type 2 ([address] 2002    DR. 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 Joyce Garcia    Necrotizing fasciitis [facility])     Nose fracture 1988    Panic attacks     Pericarditis 2003    Hospitalized with s/p heart cath normal    Peripheral autonomic neuropathy due to diabetes mellitus ([address]     Axonal EMG- NCS, March 2011    Sebaceous cyst 09/01/2011    URI (upper respiratory infection) 02/27/2012    WD-Wound, surgical, infected, initial encounter 12/08/2017    Wrist fracture 1986       Past Surgical History:        Procedure Laterality Date    ABDOMEN SURGERY      CARDIAC CATHETERIZATION  2003, 2013    Normal (Dr. Jennifer Nguyen)    COLONOSCOPY  6/2/2011    Pandiverticulosis, Nonbleeding internal hemorrhoids, Repeat colonoscopy at age 48- Dr. Terri Mcdaniels Left 12/21/2021    Shaka Girt performed by Cassia Pardo DPM at [address] [place] [address]    \"had reconstruction surgery on nose a year after the other nose surgery\"    OTHER SURGICAL HISTORY Right 05/22/2015    I & D right great toe with partial amputation    OTHER SURGICAL HISTORY  12/04/2017    inc and drainage of abcess    [place] DEEP INCIS FOOT BONE INFECTN Left 9/22/2018    LEFT FOOT DEBRIDEMENT INCISION AND DRAINAGE TOP AND BOTTOM performed by Didi Barnes DPM at [place] [address] 5/15/2021    SCROTAL AND PERINEAL DEBRIDEMENT performed by Kirill Felipe MD at [facility] 5/16/2021    SCROTAL RE-DEBRIDEMENT  INCISION AND DRAINAGE performed by Kirill Felipe MD at [address] [place], [place] [id number]    sebaceous cyst removal times 4     TOE AMPUTATION      right great toe    TOE AMPUTATION Right 3/23/2019    TOE AMPUTATION RIGHT 5TH TOE performed by Jassi Hernandez DPM at [address] Right 8/6/2019    TOE AMPUTATION RIGHT 4TH TOE performed by Jassi Hernandez DPM at [address] [facility] ENDOSCOPY  06/2/2011    Mild gastritis with moderatley severe antritis, small hiatal hernia, Dr. Lyla Hairston N/A 1/12/2019    EGD BIOPSY performed by Shilpa Grimes MD at [facility] ENDOSCOPY       Medications:   Prior to Admission medications    Medication Sig Start Date End Date Taking?  Authorizing Provider   clopidogrel (PLAVIX) 75 MG tablet Take 1 tablet by mouth daily 10/2/21  Yes Ant Proctor, APRN - QIAN   insulin glargine (LANTUS) 100 UNIT/ML injection vial Inject 75 Units into the skin nightly   Yes Historical Provider, MD   insulin aspart (NOVOLOG) 100 UNIT/ML injection vial Inject 35 Units into the skin 3 times daily (before meals)   Yes Historical Provider, MD   atorvastatin (LIPITOR) 40 MG tablet Take 1 tablet by mouth nightly 6/15/21  Yes Randi Black MD   metoprolol tartrate (LOPRESSOR) 25 MG tablet Take 1 tablet by mouth 2 times daily 6/15/21  Yes Randi Black MD   amLODIPine (NORVASC) 10 MG tablet Take 1 tablet by mouth daily 6/15/21  Yes Randi Black MD   chlorthalidone (HYGROTON) 25 MG tablet Take 1 tablet by mouth daily 6/15/21  Yes Randi Black MD   sertraline (ZOLOFT) 50 MG tablet Take 2 tablets by mouth daily 5/29/21  Yes Randi Black MD   aspirin 81 MG EC tablet Take 1 tablet by mouth daily 4/5/21  Yes Leeann Nuñez MD   linagliptin (TRADJENTA) 5 MG tablet Take 1 tablet by mouth daily 3/29/21  Yes or edema  Neurologic: Mental status: alert, oriented, interactive, following commands  Psychiatric: mood and affect appropriate     CBC:   Recent Labs     07/24/22 2018   WBC 8.5   HGB 9.1*        BMP:    Recent Labs     07/24/22 2018   *   K 5.2*      CO2 19*   BUN 70*   CREATININE 6.1*   GLUCOSE 384*     Hepatic:   Recent Labs     07/24/22 2018   AST 9*   ALT 8*   BILITOT 0.2   ALKPHOS 86              Electronically signed by Toya Barnett DO on 7/25/2022 at 6:57 AM    [facility]  MD Belkys Coy DO [address],  [place]  [facility], [place] [address]  PHONE: [phone]  FAX: [phone]

## 2022-07-25 NOTE — CONSULTS
[facility]  Gastroenterology Consultation    2022  11:08 AM    Patient:    Marylee Shines  : 1975   52 y.o. MRN: [id number]  Admitted: 2022  7:49 PM ATT: Kenny Hale MD   [id number]  AdmitDx: Hypocalcemia [E83.51]  Syncope and collapse [R55]  Hyperglycemia [R73.9]  Elevated troponin [R77.8]  CARMEN (acute kidney injury) ([address] [N17.9]  PCP: Priya Livingston MD    Reason for Consult:  GIB    Requesting Physician:  Kenny Hale MD      History Obtained From:  Patient and review of all records    HISTORY OF PRESENT ILLNESS:                The patient is a 52 y.o. male with significant   Past Medical History:   Diagnosis Date    Abscess     scrotal    Acute renal failure (ARF) ([address] 2019    Anxiety associated with depression     Anxiety associated with depression     Back pain 2012    Chest pain 2013    Diabetic nephropathy ([address]     Diabetic neuropathy ([address] 2011    Diabetic ulcer of left midfoot associated with type 2 diabetes mellitus, with fat layer exposed ([address] 2017    Diverticulosis     C scope + Dr. Ismael Chakraborty    DM (diabetes mellitus), type 2 ([address]     DR. 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 Ismael Chakraborty    Necrotizing fasciitis [facility])     Nose fracture     Panic attacks     Pericarditis     Hospitalized with s/p heart cath normal    Peripheral autonomic neuropathy due to diabetes mellitus ([address]     Axonal EMG- NCS, 2011    Sebaceous cyst 2011    URI (upper respiratory infection) 2012    WD-Wound, surgical, infected, initial encounter 2017    Wrist fracture 1986    who presented to [address] [place] [address] ED with weakness for 2 weeks. Found to be in acute kidney injury.  Plans to initiate dialysis today.     Abdominal pain  N/V, GERD, dyspepsia, dysphagia   Last BM this morning melena     History of EGD 1/12/2019 Grade III Esophagitis, Esophagus otherwise normal, Stomach Normal H Pylori Biopsy done, Duodenum normal    No History of colonoscopy     ASA 81 mg    No NSAIDs  Plavix 75 mg shikha, Eliquis 2.5 mg daily     Smoker  Alcohol intake    Past Medical History:        Diagnosis Date    Abscess 2010    scrotal    Acute renal failure (ARF) [facility] 08/04/2019    Anxiety associated with depression     Anxiety associated with depression     Back pain 07/02/2012    Chest pain 05/01/2013    Diabetic nephropathy ([address]     Diabetic neuropathy ([address] 12/22/2011    Diabetic ulcer of left midfoot associated with type 2 diabetes mellitus, with fat layer exposed ([address] 07/18/2017    Diverticulosis     C scope + Dr. Porsha Gardner    DM (diabetes mellitus), type 2 ([facility] 2002    DR. 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 Porsha Gardner    Necrotizing fasciitis [facility])     Nose fracture 1988    Panic attacks     Pericarditis 2003    Hospitalized with s/p heart cath normal    Peripheral autonomic neuropathy due to diabetes mellitus ([facility]     Axonal EMG- NCS, March 2011    Sebaceous cyst 09/01/2011    URI (upper respiratory infection) 02/27/2012    WD-Wound, surgical, infected, initial encounter 12/08/2017    Wrist fracture 1986       Past Surgical History:        Procedure Laterality Date    [address]  2003, 2013    Normal (Dr. Holly Zimmer)    COLONOSCOPY  6/2/2011    Pandiverticulosis, Nonbleeding internal hemorrhoids, Repeat colonoscopy at age 48- Dr. Radha Vazquez Left 12/21/2021    EXCISION OF HEAD LEFT 2ND METATARSAL performed by Ehsan Boles DPM at [address] [facility]    \"had IntraVENous Once     PRN Medications: glucose, dextrose bolus **OR** dextrose bolus, glucagon (rDNA), dextrose, sodium chloride flush, sodium chloride, ondansetron **OR** ondansetron, polyethylene glycol, acetaminophen **OR** acetaminophen      Allergies:  Adhesive tape, Doxycycline, and Reglan [metoclopramide]    Social History:   TOBACCO:   reports that he has been smoking cigarettes. He has never used smokeless tobacco.  ETOH:   reports that he does not currently use alcohol. Family History:       Problem Relation Age of Onset    Cancer Mother         ?site    Stroke Mother     Bleeding Prob Mother     Diabetes Father     Heart Disease Father     High Blood Pressure Father     Obesity Father     Kidney Disease Father     Diabetes Sister     High Blood Pressure Sister     Mental Illness Sister     Obesity Sister     High Blood Pressure Other     Mental Illness Other         bipolar    Other Son         cyst in ear canal    ADHD Daughter        No family history of colon cancer, Crohn's disease, or ulcerative colitis. REVIEW OF SYSTEMS:    The positive ROS will be identified in bold, otherwise ROS are negative     CONSTITUTIONAL:  Neg   Recent weight changes, fatigue, fever, chills or night sweats  EYES:  Neg  Blurriness, earing, itching or acute change in vision  EARS:  Neg  hearing loss, tinnitus, vertigo, discharge or earache. NOSE:  Neg  Rhinorrhea, sneezing, itching, allergy or epistaxis  MOUTH/THROAT:  Neg  bleeding gums, hoarseness or sore throat. RESPIRATORY:   Neg SOB, wheeze, cough, sputum, hemoptysis or bronchitis  CARDIOVASCULAR:  Neg chest pain, palpitations, dyspnea on exertion, orthopnea, paroxysmal nocturnal dyspnea or edema  GASTROINTESTINAL:  SEE HPI  GENITOURINARY:  Neg  Urinary frequency, hesitancy, urgency, polyuria, dysuria, hematuria, nocturia, or incontinence.   HEMATOLOGIC/LYMPHATIC:  Neg  Anemia, bleeding tendency  MUSCULOSKELETAL:    New myalgias, bone pain, joint pain, swelling or stiffness and has had change in gait. NEUROLOGICAL:  Neg  Loss of Consciousness, memory loss, forgetfulness, periods of confusion, difficulty concentrating, seizures, decline in intellect, nervousness, insomina, aphasia or dysarthria. SKIN:  Neg  skin or hair changes, and has no itching, rashes, or sores. PSYCHIATRIC:  Neg depression, personality changes, anxiety. ENDOCRINE:  Neg polydipsia, polyuria, abnormal weight changes, heat /cold intolerance.   ALL/IMM:  Neg reactions to drugs other than listed    PHYSICAL EXAM:      Vitals:    /83   Pulse 88   Temp 97.7 °F (36.5 °C) (Oral)   Resp 18   Ht 5' 9\" (1.753 m)   Wt (!) 318 lb 9 oz (144.5 kg)   SpO2 98%   BMI 47.04 kg/m²     General Appearance:    Alert, cooperative, no distress, appears stated age   HEENT:    Normocephalic, atraumatic, Conjunctiva clear, Lips, mucosa, and tongue normal; teeth and gums normal   Neck:   Supple, symmetrical, trachea midline   Lungs:     Clear to auscultation bilaterally, respirations unlabored   Chest Wall:    No tenderness or deformity    Heart:    Regular rate and rhythm, S1 and S2 normal, no murmur, rub   or gallop   Abdomen:     Soft, non-tender, bowel sounds active all four quadrants,     no masses, no organomegaly, no ascites    Rectal:    Deferred   Extremities:   Extremities normal, atraumatic, no cyanosis or edema   Pulses:   2+ and symmetric all extremities   Skin:   Skin color, texture, turgor normal, no rashes or lesions   Lymph nodes:   No abnormality   Neurologic:   No focal deficits, moving all four extremities            DATA:    ABGs:   Lab Results   Component Value Date/Time    PO2ART 119 06/18/2013 01:15 PM    KFW7HPN 47.0 06/18/2013 01:15 PM     CBC:   Recent Labs     07/24/22 2018   WBC 8.5   HGB 9.1*        BMP:    Recent Labs     07/24/22 2018 07/25/22  0650   * 135   K 5.2* 6.1*    102   CO2 19* 20*   BUN 70* 74*   CREATININE 6.1* 5.9*   GLUCOSE 384* 303*     Magnesium: Lab Results   Component Value Date/Time    MG 2.4 06/11/2021 06:09 AM     Hepatic:   Recent Labs     07/24/22 2018   AST 9*   ALT 8*   BILITOT 0.2   ALKPHOS 86     No results for input(s): LIPASE, AMYLASE in the last 72 hours. Recent Labs     07/25/22  0914   PROTIME 11.8   INR 0.92     No results for input(s): PTT in the last 72 hours. Lipids: No results for input(s): CHOL, HDL in the last 72 hours.     Invalid input(s): LDLCALCU  INR:   Recent Labs     07/25/22  0914   INR 0.92     TSH: No results found for: TSH    Intake/Output Summary (Last 24 hours) at 7/25/2022 1108  Last data filed at 7/25/2022 1021  Gross per 24 hour   Intake --   Output 500 ml   Net -500 ml      dextrose      sodium chloride         Imaging Studies: reviewed        IMPRESSION:      Patient Active Problem List   Diagnosis Code    Hiatal hernia K44.9    Diabetes mellitus type 2, improved controlled E11.65    Diabetic neuropathy ([facility] E11.40    Panic attack F41.0    Anxiety associated with depression F41.8    Neck pain M54.2    DANIELA (obstructive sleep apnea) G47.33    Urinary frequency R35.0    Bilateral carpal tunnel syndrome- left worse than right G56.03    Hyperlipidemia with target LDL less than 100 E78.5    Essential hypertension I10    Bronchitis J40    Osteomyelitis (HCC) M86.9    Abscess B03.25    Periumbilical hernia X03.7    Sepsis ([address] A41.9    Cellulitis of left foot L03.116    Constipation K59.00    Intractable nausea and vomiting R11.2    Uncontrolled type 2 diabetes mellitus (HCC) E11.65    Nausea and vomiting R11.2    Diabetic foot infection (HCC) E11.628, L08.9    Acute renal failure (ARF) (HCC) N17.9    Cellulitis L03.90    Cellulitis of left arm L03.114    Cellulitis, scrotum N49.2    Acute epididymitis N45.1    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    Recurrent major depressive disorder, in full remission ([address] F33.42    Generalized anxiety disorder F41.1    Morbid obesity with body mass index (BMI) of 40.0 to 44.9 in adult [facility]) E66.01, Z68.41    Necrotizing fasciitis ([address] M72.6    Syncope and collapse R55    Right groin wound S31.109A    Ulcer of right groin with fat layer exposed ([address] L98.492    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 type 2 diabetes mellitus, with muscle involvement without evidence of necrosis ([facility]) E11.621, L97.425    Nephrotic syndrome N04.9    Generalized edema R60.1    Stage 3b chronic kidney disease (HCC) N18.32    Diabetic nephropathy associated with type 2 diabetes mellitus (HCC) E11.21    Hypoalbuminemia E88.09    Chronic kidney disease, stage IV (severe) ([facility]) N18.4    FH: CAD (coronary artery disease) Z82.49    ASCVD (arteriosclerotic cardiovascular disease) I25.10    Other proteinuria R80.8    Chest pain R07.9    Surgical wound dehiscence T81.31XA    ACRMEN (acute kidney injury) ([facility] N17.9       ASSESSMENT/RECOMMENDATIONS:    GIB:  Melenotic stool this morning  Hgb 9.1, BUN/creat 74/5.9, K+6.1  CT abdomen negative   CARMEN, hemodialysis initiated today  On plavix and Eliquis (currently on hold)   INR 0.92    Recommendations:    Monitor H&H, transfuse prbc's to keep hgb >7  PPI BID  EGD tomorrow aroound 0700  Npo after midnight  Asked nurse to put consent form in chart     Discussed plan of care with patient and RN     Patient clinical, biochemical, and radiological information discussed with Dr. Sukumar Godinez. He agrees with the assessment and plan. APPLE Li - CNP, CNP  7/25/2022  11:08 AM     Melena    May have PUD    Also having progressive renal failure    RECOMMEND : PPI BID    EGD am    I have seen and examined this patient personally, and independently of the nurse practitioner. The plan was developed mutually at the time of the visit with the patient. Dahiana Taylor and myself have spoken with patient, nursing staff and provided written and verbal instructions .     The above note has been reviewed and I agree with the Assessment,  Diagnosis, and Treatment plan as suggested by Sarika De Los Santos CNP      [facility] gastroenterology

## 2022-07-25 NOTE — ED PROVIDER NOTES
EKG shows NSR @ 95. Normal axis with good R wave progression. No ST elevation or depression. No ectopy. No acute change from prior tracing.       Steve Bob DO  07/25/22 0040

## 2022-07-25 NOTE — PROGRESS NOTES
GIVE NS  ml per hr  Give 2g calcium gluconate  Obtain renal US  This either CARMEN or CKD progression

## 2022-07-25 NOTE — PROGRESS NOTES
V2.0  [facility] [place]      Name:  Elsa Velasco /Age/Sex: 1975  (52 y.o. male)   MRN & CSN:  [id number] & [id number] Encounter Date/Time: 2022 10:46 AM EDT    Location:  [address] [place] [address] PCP: Rima Alexis MD       Hospital Day: 2    Assessment and Plan:   Elsa Velasco is a 52 y.o. 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, hypertension, hyperlipidemia, type 2 diabetes and CKD stage IV with baseline around 2.2 who presents with Syncope    Assessment and Plan    *Syncope  Likely secondary to orthostatic hypotension and metabolic derangements  CT head within normal limits  EKG within normal limits  Neurology following, appreciate recommendations  Fall precautions    *Acute on chronic anemia secondary to GI bleed  Normocytic anemia due to underlying chronic kidney disease  Iron panel consistent with chronic kidney disease  Started on EPO  Monitor CBC and transfuse if hemoglobin less than 7  Gastroenterology consulted: Plan for EGD in a.m. Hold Plavix and Eliquis  IV Protonix twice daily    *Acute on Chronic Kidney disease Stage 4 vs Progression to ESRD  Renal ultrasound and abdominal imaging report reviewed  Temporary dialysis catheter placed by interventional radiology. Postprocedure chest x-ray personally reviewed, dialysis catheter tip in mid right atrium. Discussed with nursing supervisor to relay message to interventional radiology regarding repositioning of the catheter.    Nephrology following orders for dialysis  Repeat BMP stat ordered still pending    *Hyperkalemia  Secondary to underlying progression of renal disease  EKG reviewed-no changes of hyperkalemia  D50, insulin 10 units IV, calcium gluconate ordered  IV Bumex x1  Orders for dialysis by nephrology  Repeat BMP    *Type 2 diabetes with hyperglycemia  Basal insulin in addition to medium-dose insulin sliding scale  Target blood glucose level 140-180  HbA1c 10%    *Abdominal pain with vomiting  Likely secondary to underlying metabolic abnormalities  GI consulted appreciate recommendations  IV Protonix 40 mg twice daily  EGD in a.m. Diet Diet NPO   DVT Prophylaxis [] Lovenox, [x]  Heparin, [] SCDs, [] Ambulation,  [] Eliquis, [] Xarelto  [] Coumadin   Code Status Full Code   Disposition From: Home  Expected Disposition: Home  Estimated Date of Discharge: 7/27/2022  Patient requires continued admission due to hemodialysis, electrolyte abnormality correction, EGD. Surrogate Decision Maker/ POA Wife     Subjective:     Chief Complaint: Loss of Consciousness (Did not hit head, no blood thinners)       Brando Orona is a 52 y.o. male who presents with a syncopal episode. Patient was seen and evaluated at bedside reports abdominal pain and anxiety prior to scheduled procedure. Patient is hemodynamically stable. Discussed with nursing regarding hyperkalemia management. Objective: Intake/Output Summary (Last 24 hours) at 7/25/2022 1739  Last data filed at 7/25/2022 1021  Gross per 24 hour   Intake --   Output 500 ml   Net -500 ml        Vitals:   Vitals:    07/25/22 1730   BP: 119/74   Pulse: 68   Resp:    Temp:    SpO2:        Physical Exam:   Physical Exam  Constitutional:       General: He is not in acute distress. Appearance: He is ill-appearing. He is not toxic-appearing or diaphoretic. HENT:      Head: Normocephalic and atraumatic. Nose: Nose normal.      Mouth/Throat:      Mouth: Mucous membranes are dry. Pharynx: Oropharynx is clear. Eyes:      Conjunctiva/sclera: Conjunctivae normal.      Pupils: Pupils are equal, round, and reactive to light. Cardiovascular:      Rate and Rhythm: Normal rate and regular rhythm. Pulses: Normal pulses. Heart sounds: Normal heart sounds. Pulmonary:      Effort: Pulmonary effort is normal.      Breath sounds: Normal breath sounds.    Abdominal: Tenderness: There is abdominal tenderness. There is no guarding or rebound. Musculoskeletal:      Cervical back: Normal range of motion and neck supple. Right lower leg: No edema. Left lower leg: No edema. Comments: Multiple toe amputations   Skin:     General: Skin is warm and dry. Neurological:      General: No focal deficit present. Mental Status: He is oriented to person, place, and time.    Psychiatric:      Comments: Anxious          Medications:   Medications:    atorvastatin  40 mg Oral Nightly    [Held by provider] metoprolol tartrate  25 mg Oral BID    sertraline  100 mg Oral Daily    sodium chloride flush  5-40 mL IntraVENous 2 times per day    calcitRIOL  0.25 mcg Oral Daily    heparin (porcine)  5,000 Units SubCUTAneous 3 times per day    insulin NPH  15 Units SubCUTAneous Once    insulin glargine  30 Units SubCUTAneous Nightly    insulin lispro  15 Units SubCUTAneous TID WC    insulin lispro  0-8 Units SubCUTAneous TID WC    insulin lispro  0-8 Units SubCUTAneous 2 times per day    epoetin paola-epbx  10,000 Units IntraVENous Once per day on Mon Wed Fri      Infusions:    dextrose      sodium chloride       PRN Meds: glucose, 4 tablet, PRN  dextrose bolus, 125 mL, PRN   Or  dextrose bolus, 250 mL, PRN  glucagon (rDNA), 1 mg, PRN  dextrose, , Continuous PRN  sodium chloride flush, 5-40 mL, PRN  sodium chloride, , PRN  ondansetron, 4 mg, Q8H PRN   Or  ondansetron, 4 mg, Q6H PRN  polyethylene glycol, 17 g, Daily PRN  acetaminophen, 650 mg, Q6H PRN   Or  acetaminophen, 650 mg, Q6H PRN      Labs      Recent Results (from the past 24 hour(s))   EKG 12 Lead    Collection Time: 07/24/22  7:48 PM   Result Value Ref Range    Ventricular Rate 95 BPM    Atrial Rate 95 BPM    P-R Interval 162 ms    QRS Duration 66 ms    Q-T Interval 360 ms    QTc Calculation (Bazett) 452 ms    P Axis -5 degrees    R Axis 18 degrees    T Axis 73 degrees    Diagnosis       Normal sinus rhythm  Normal ECG  When compared with ECG of 17-FEB-2022 16:12,  No significant change was found     CBC with Auto Differential    Collection Time: 07/24/22  8:18 PM   Result Value Ref Range    WBC 8.5 4.0 - 10.5 K/CU MM    RBC 3.03 (L) 4.6 - 6.2 M/CU MM    Hemoglobin 9.1 (L) 13.5 - 18.0 GM/DL    Hematocrit 26.8 (L) 42 - 52 %    MCV 88.4 78 - 100 FL    MCH 30.0 27 - 31 PG    MCHC 34.0 32.0 - 36.0 %    RDW 12.7 11.7 - 14.9 %    Platelets 275 996 - 905 K/CU MM    MPV 10.6 7.5 - 11.1 FL    Differential Type AUTOMATED DIFFERENTIAL     Segs Relative 71.6 (H) 36 - 66 %    Lymphocytes % 16.4 (L) 24 - 44 %    Monocytes % 7.8 (H) 0 - 4 %    Eosinophils % 2.5 0 - 3 %    Basophils % 0.5 0 - 1 %    Segs Absolute 6.1 K/CU MM    Lymphocytes Absolute 1.4 K/CU MM    Monocytes Absolute 0.7 K/CU MM    Eosinophils Absolute 0.2 K/CU MM    Basophils Absolute 0.0 K/CU MM    Nucleated RBC % 0.0 %    Total Nucleated RBC 0.0 K/CU MM    Total Immature Neutrophil 0.10 K/CU MM    Immature Neutrophil % 1.2 (H) 0 - 0.43 %   Comprehensive Metabolic Panel w/ Reflex to MG    Collection Time: 07/24/22  8:18 PM   Result Value Ref Range    Sodium 130 (L) 135 - 145 MMOL/L    Potassium 5.2 (H) 3.5 - 5.1 MMOL/L    Chloride 100 99 - 110 mMol/L    CO2 19 (L) 21 - 32 MMOL/L    BUN 70 (H) 6 - 23 MG/DL    Creatinine 6.1 (H) 0.9 - 1.3 MG/DL    Glucose 384 (H) 70 - 99 MG/DL    Calcium 6.7 (LL) 8.3 - 10.6 MG/DL    Albumin 2.2 (L) 3.4 - 5.0 GM/DL    Total Protein 4.9 (L) 6.4 - 8.2 GM/DL    Total Bilirubin 0.2 0.0 - 1.0 MG/DL    ALT 8 (L) 10 - 40 U/L    AST 9 (L) 15 - 37 IU/L    Alkaline Phosphatase 86 40 - 128 IU/L    GFR Non-African American 10 (L) >60 mL/min/1.73m2    GFR  12 (L) >60 mL/min/1.73m2    Anion Gap 11 4 - 16   Troponin    Collection Time: 07/24/22  8:18 PM   Result Value Ref Range    Troponin T 0.097 (H) <0.01 NG/ML   Brain Natriuretic Peptide    Collection Time: 07/24/22  8:18 PM   Result Value Ref Range    Pro-BNP 10,098 (H) <300 PG/ML   Urinalysis Collection Time: 07/24/22 11:40 PM   Result Value Ref Range    Color, UA YELLOW YELLOW    Clarity, UA CLEAR CLEAR    Glucose, Urine >1,000 (A) NEGATIVE MG/DL    Bilirubin Urine NEGATIVE NEGATIVE MG/DL    Ketones, Urine NEGATIVE NEGATIVE MG/DL    Specific Gravity, UA 1.020 1.001 - 1.035    Blood, Urine SMALL NUMBER OR AMOUNT OBSERVED (A) NEGATIVE    pH, Urine 6.0 5.0 - 8.0    Protein, UA >300 (HH) NEGATIVE MG/DL    Urobilinogen, Urine 0.2 0.2 - 1.0 MG/DL    Nitrite Urine, Quantitative NEGATIVE NEGATIVE    Leukocyte Esterase, Urine NEGATIVE NEGATIVE    RBC, UA 1 0 - 3 /HPF    WBC, UA 2 0 - 2 /HPF    Bacteria, UA NEGATIVE NEGATIVE /HPF    Squam Epithel, UA <1 /HPF    Mucus, UA RARE (A) NEGATIVE HPF    Trichomonas, UA NONE SEEN NONE SEEN /HPF    Amorphous, UA RARE /HPF   POCT Glucose    Collection Time: 07/25/22  2:08 AM   Result Value Ref Range    POC Glucose 253 (H) 70 - 99 MG/DL   Basic Metabolic Panel    Collection Time: 07/25/22  6:50 AM   Result Value Ref Range    Sodium 135 135 - 145 MMOL/L    Potassium 6.1 (HH) 3.5 - 5.1 MMOL/L    Chloride 102 99 - 110 mMol/L    CO2 20 (L) 21 - 32 MMOL/L    Anion Gap 13 4 - 16    BUN 74 (H) 6 - 23 MG/DL    Creatinine 5.9 (H) 0.9 - 1.3 MG/DL    Glucose 303 (H) 70 - 99 MG/DL    Calcium 7.1 (L) 8.3 - 10.6 MG/DL    GFR Non-African American 10 (L) >60 mL/min/1.73m2    GFR  12 (L) >60 mL/min/1.73m2   Hemoglobin A1C    Collection Time: 07/25/22  6:50 AM   Result Value Ref Range    Hemoglobin A1C 10.0 (H) 4.2 - 6.3 %    eAG 240 mg/dL   SPECIMEN REJECTION    Collection Time: 07/25/22  6:50 AM   Result Value Ref Range    Rejected Test [facility]     Reason for Rejection UNABLE TO PERFORM TESTING:    Vitamin B12 & Folate    Collection Time: 07/25/22  6:50 AM   Result Value Ref Range    Vitamin B-12 471.0 211 - 911 pg/ml    Folate 7.1 3.1 - 17.5 NG/ML   Ferritin    Collection Time: 07/25/22  6:50 AM   Result Value Ref Range    Ferritin 931 (H) 30 - 400 NG/ML   Hepatitis B Surface Antigen    Collection Time: 07/25/22  6:50 AM   Result Value Ref Range    Hepatitis B Surface Ag NON REACTIVE NON REACTIVE   Hepatitis B Surface Antibody    Collection Time: 07/25/22  6:50 AM   Result Value Ref Range    Hep B S Ab <3.5    Prealbumin    Collection Time: 07/25/22  6:50 AM   Result Value Ref Range    Prealbumin 29 20 - 40 mg/dL   Iron and TIBC    Collection Time: 07/25/22  6:50 AM   Result Value Ref Range    Iron 80 59 - 158 ug/dL    UIBC 110 110 - 370 ug/dL    TIBC 190 (L) 250 - 450 ug/dL    Transferrin % 42 10 - 44 %   TSH    Collection Time: 07/25/22  6:50 AM   Result Value Ref Range    TSH, High Sensitivity 3.860 0.270 - 4.20 uIu/ml   Troponin    Collection Time: 07/25/22  6:50 AM   Result Value Ref Range    Troponin T 0.096 (H) <0.01 NG/ML   SPECIMEN REJECTION    Collection Time: 07/25/22  6:50 AM   Result Value Ref Range    Rejected Test TSHS TIBCB FERR PALB TROT B12FL     Reason for Rejection UNABLE TO PERFORM TESTING:    POCT Glucose    Collection Time: 07/25/22  7:25 AM   Result Value Ref Range    POC Glucose 319 (H) 70 - 99 MG/DL   EKG 12 Lead    Collection Time: 07/25/22  8:06 AM   Result Value Ref Range    Ventricular Rate 86 BPM    Atrial Rate 86 BPM    P-R Interval 174 ms    QRS Duration 82 ms    Q-T Interval 384 ms    QTc Calculation (Bazett) 459 ms    P Axis 43 degrees    R Axis 27 degrees    T Axis 90 degrees    Diagnosis       Normal sinus rhythm  Normal ECG  When compared with ECG of 24-JUL-2022 19:48,  No significant change was found     EKG 12 Lead    Collection Time: 07/25/22  8:42 AM   Result Value Ref Range    Ventricular Rate 87 BPM    Atrial Rate 87 BPM    P-R Interval 174 ms    QRS Duration 84 ms    Q-T Interval 380 ms    QTc Calculation (Bazett) 457 ms    P Axis 28 degrees    R Axis 34 degrees    T Axis 100 degrees    Diagnosis       Normal sinus rhythm  Nonspecific T wave abnormality  Abnormal ECG  When compared with ECG of 25-JUL-2022 08:06,  No significant change was found     Protime/INR & PTT    Collection Time: 07/25/22  9:14 AM   Result Value Ref Range    Protime 11.8 11.7 - 14.5 SECONDS    INR 0.92 INDEX    aPTT 30.3 25.1 - 37.1 SECONDS   POCT Glucose    Collection Time: 07/25/22 10:55 AM   Result Value Ref Range    POC Glucose 377 (H) 70 - 99 MG/DL   POCT Glucose    Collection Time: 07/25/22 12:53 PM   Result Value Ref Range    POC Glucose 270 (H) 70 - 99 MG/DL        Imaging/Diagnostics Last 24 Hours   CT ABDOMEN PELVIS WO CONTRAST Additional Contrast? None    Result Date: 7/24/2022  EXAMINATION: CT OF THE ABDOMEN AND PELVIS WITHOUT CONTRAST 7/24/2022 9:58 pm TECHNIQUE: CT of the abdomen and pelvis was performed without the administration of intravenous contrast. Multiplanar reformatted images are provided for review. Automated exposure control, iterative reconstruction, and/or weight based adjustment of the mA/kV was utilized to reduce the radiation dose to as low as reasonably achievable. COMPARISON: 08/11/2021 HISTORY: ORDERING SYSTEM PROVIDED HISTORY: pain TECHNOLOGIST PROVIDED HISTORY: Reason for exam:->pain Additional Contrast?->None Reason for Exam: pain FINDINGS: Lower Chest: Partially imaged coronary artery calcification. Organs: The liver is normal in contour and attenuation. Gallbladder is unremarkable. No biliary ductal dilatation. Pancreas, adrenals, kidneys, spleen are unremarkable. Mild calcific atherosclerosis. GI/Bowel: Left colonic diverticulosis. Bowel is nondilated without wall thickening. Appendix is normal. Pelvis: Unremarkable. Peritoneum/Retroperitoneum: No free air, free fluid, organized fluid collection, lymphadenopathy. Bones/Soft Tissues: No acute bone or soft tissue abnormality. No acute process in the abdomen or pelvis.      CT HEAD WO CONTRAST    Result Date: 7/24/2022  EXAMINATION: CT OF THE HEAD WITHOUT CONTRAST  7/24/2022 9:57 pm TECHNIQUE: CT of the head was performed without the administration of intravenous contrast. Automated exposure control, iterative reconstruction, and/or weight based adjustment of the mA/kV was utilized to reduce the radiation dose to as low as reasonably achievable. COMPARISON: 11/25/2019 HISTORY: ORDERING SYSTEM PROVIDED HISTORY: syncope TECHNOLOGIST PROVIDED HISTORY: Has a \"code stroke\" or \"stroke alert\" been called? ->No Reason for exam:->syncope Decision Support Exception - unselect if not a suspected or confirmed emergency medical condition->Emergency Medical Condition (MA) Reason for Exam: syncope FINDINGS: BRAIN/VENTRICLES: There is no acute intracranial hemorrhage, mass effect or midline shift. No abnormal extra-axial fluid collection. The gray-white differentiation is maintained without evidence of an acute infarct. There is no evidence of hydrocephalus. ORBITS: The visualized portion of the orbits demonstrate no acute abnormality. SINUSES: The visualized paranasal sinuses and mastoid air cells demonstrate no acute abnormality. SOFT TISSUES/SKULL:  No acute abnormality of the visualized skull or soft tissues. No acute intracranial abnormality. CT CERVICAL SPINE WO CONTRAST    Result Date: 7/24/2022  EXAMINATION: CT OF THE CERVICAL SPINE WITHOUT CONTRAST 7/24/2022 9:58 pm TECHNIQUE: CT of the cervical spine was performed without the administration of intravenous contrast. Multiplanar reformatted images are provided for review. Automated exposure control, iterative reconstruction, and/or weight based adjustment of the mA/kV was utilized to reduce the radiation dose to as low as reasonably achievable. COMPARISON: 07/09/2018 HISTORY: ORDERING SYSTEM PROVIDED HISTORY: fall TECHNOLOGIST PROVIDED HISTORY: Reason for exam:->fall Decision Support Exception - unselect if not a suspected or confirmed emergency medical condition->Emergency Medical Condition (MA) Reason for Exam: fall FINDINGS: BONES/ALIGNMENT: There is no acute fracture or traumatic malalignment.  DEGENERATIVE CHANGES: No significant degenerative changes. SOFT TISSUES: There is no prevertebral soft tissue swelling. No acute abnormality of the cervical spine. XR CHEST PORTABLE    Result Date: 7/24/2022  EXAMINATION: ONE XRAY VIEW OF THE CHEST 7/24/2022 8:31 pm COMPARISON: 09/30/2021 HISTORY: ORDERING SYSTEM PROVIDED HISTORY: Chest pain TECHNOLOGIST PROVIDED HISTORY: Reason for exam:->Chest pain Reason for Exam: loss of consciousness Additional signs and symptoms: chest pain FINDINGS: Cardiomediastinal silhouette is unchanged in size. There is no pleural effusion or pneumothorax. There is no pulmonary consolidation. There is no acute osseous abnormality. No acute cardiopulmonary abnormality. US RETROPERITONEAL LIMITED    Result Date: 7/25/2022  EXAMINATION: ULTRASOUND OF THE KIDNEYS 7/24/2022 11:18 pm COMPARISON: None. HISTORY: ORDERING SYSTEM PROVIDED HISTORY: García TECHNOLOGIST PROVIDED HISTORY: Reason for exam:->García Reason for Exam: garcía FINDINGS: The right kidney measures 12.8 cm in length and the left kidney measures 13.2 cm in length. Kidneys demonstrate normal cortical echogenicity. No hydronephrosis or intrarenal stones. No focal lesions. Unremarkable ultrasound of the kidneys.        Electronically signed by Waneta Bernheim, MD on 7/25/2022 at 5:39 PM

## 2022-07-25 NOTE — H&P
History and Physical      Name:  Buck Santizo DOB/Age/Sex: 1975  (52 y.o. male)   MRN & CSN:  [id number] & [id number] Encounter Date/Time: 2022 12:37 AM EDT   Location:  ED17/ED-17 PCP: Heydi Pichardo MD       Hospital Day: 2    Assessment and Plan:     Patient is a 80-year-old male who presented with weakness for 2 weeks. # CARMEN  - Baseline around 2.2, 6.1 on admission.  - Likely secondary to decreased PO intake while taking nephrotoxic medications. No evidence of post-renal obstruction.  - US pending. Work-up pending. Follow-up. - Held all nephrotoxic medications.  - Nephro consulted, follow-up. - Consider Luna for strict I/O's.  - ?ATN    # Anemia  - No evidence of overt bleeding, however reported having dark and sticky bowel movement this morning.  - Held ASA, Plavix and ppx AC.  - Follow-up labs. # NSTEMI, type II  - Denied any CP.   - ECG without acute ischemic changes. Tn mildly elevated. - Follow-up repeat labs. # T2DM  - Decreased home Insulin to 10 qhs and 5 u TIDWM due to severe renal impairment. - Endocrinology consulted, follow-up. Checklist:  DVT ppx: Heparin  Catheter: N/A  Advanced directive: full  Nutrition/Diet: cardiac / renal / diabetic    Disposition: patient requires continued admission due to CARMEN. MDM: moderate. History of Present Illness:     Chief Complaint: Weakness    Patient is a 80-year-old male with a PMHx of CAD s/p PCI in 2021, HTN, HLD and T2DM who presented to the ED due to weakness. Patient reported that his weakness has been progressive for the past 3 weeks with generalized complaints including fatigue, midepigastric abdominal pain, weakness and decreased p.o. intake. Patient reported that he stood up earlier in the day prior to presentation and felt lightheaded and fell to the floor. At that time he had a large, sticky, dark bowel movement and was unconscious for about 30 seconds.   Denied any seizure-like activity or confusion afterwards. Also reported that he has been having increased polyuria and polydipsia, and has not been able to keep up with fluid intake. Denied any repeat melena or other sources of bleeding. Denied any fevers, chills, shortness of breath or chest pain. Denied any alcohol, tobacco, or illicit drug use. In the ED, he was found to have CARMEN on CKD. CT showed no acute processes in the abdomen or the pelvis. CT head negative for any acute changes. ROS:     Ten point ROS reviewed negative, unless as noted above. Objective:     No intake or output data in the 24 hours ending 07/25/22 0037     Vitals:   Vitals:    07/24/22 2101 07/24/22 2123 07/24/22 2124 07/24/22 2126   BP: (!) 184/115 (!) 160/118 (!) 151/103 (!) 129/108   Pulse: 95 96 97 (!) 104   Resp: 20 24 22 23   Temp:       TempSrc:       SpO2: 99% 99% 100% 98%   Weight:       Height:         BMI: Body mass index is 44.3 kg/m². General: Awake. WDWN. AAOx3. NAD. Obese. HEENT: NCAT. PERRLA. Vision grossly intact. Hearing grossly intact. Nasal turbinates clear. Oropharynx clear. Dry mucous membranes. Neck: Supple. No JVP/JVD. CV: RRR. NL S1/S2. 2/6 SM. CR <2 secs. No peripheral edema. Pulm: NL effort on RA. CTAB. No R/R/W. CW NTTP. GI: +BS x4. Soft. NT/ND. : No CVA tenderness. No Luna catheter. No suprapubic tenderness or masses appreciated. Ext: No edema. Peripheral pulses intact. Skin: Intact. Normal coloration, warm, dry. MSK: No gross joint deformities. Full ROM. Muscle strength is 5/5 B/L. Neuro: CNs grossly intact. Normal speech. No focal deficit. Psych: Good judgement and reason. Past History:      PMHx:   Past Medical History:   Diagnosis Date    Abscess 2010    scrotal    Acute renal failure (ARF) ([facility] 08/04/2019    Anxiety associated with depression     Anxiety associated with depression     Back pain 07/02/2012    Chest pain 05/01/2013    Diabetic nephropathy ([facility]     Diabetic neuropathy ([facility] 12/22/2011    Diabetic another family member; Obesity in his father and sister; Other in his son; Stroke in his mother. SHx:   Social History     Socioeconomic History    Marital status:      Spouse name: None    Number of children: None    Years of education: None    Highest education level: None   Tobacco Use    Smoking status: Some Days     Years: 20.00     Types: Cigarettes    Smokeless tobacco: Never    Tobacco comments:     Smokes when drinking/social   Vaping Use    Vaping Use: Never used   Substance and Sexual Activity    Alcohol use: Not Currently     Comment: occ    Drug use: Yes     Frequency: 7.0 times per week     Types: Marijuana Simmie Bobby)     Comment: 12/20/21 @ 2000    Sexual activity: Yes     Partners: Female       Medications Prior to Admission     Prior to Admission medications    Medication Sig Start Date End Date Taking?  Authorizing Provider   clopidogrel (PLAVIX) 75 MG tablet Take 1 tablet by mouth daily 10/2/21  Yes APPLE Tomas CNP   insulin glargine (LANTUS) 100 UNIT/ML injection vial Inject 75 Units into the skin nightly   Yes Historical Provider, MD   insulin aspart (NOVOLOG) 100 UNIT/ML injection vial Inject 35 Units into the skin 3 times daily (before meals)   Yes Historical Provider, MD   atorvastatin (LIPITOR) 40 MG tablet Take 1 tablet by mouth nightly 6/15/21  Yes Lemuel Bedoya MD   metoprolol tartrate (LOPRESSOR) 25 MG tablet Take 1 tablet by mouth 2 times daily 6/15/21  Yes Lemuel Bedoya MD   amLODIPine (NORVASC) 10 MG tablet Take 1 tablet by mouth daily 6/15/21  Yes Lemuel Bedoya MD   chlorthalidone (HYGROTON) 25 MG tablet Take 1 tablet by mouth daily 6/15/21  Yes Lemuel Bedoya MD   sertraline (ZOLOFT) 50 MG tablet Take 2 tablets by mouth daily 5/29/21  Yes Lemuel Bedoya MD   aspirin 81 MG EC tablet Take 1 tablet by mouth daily 4/5/21  Yes Frederich Halsted, MD   linagliptin (TRADJENTA) 5 MG tablet Take 1 tablet by mouth daily 3/29/21  Yes Lemuel Bedoya MD ondansetron (ZOFRAN) 4 MG tablet Take 1 tablet by mouth every 8 hours as needed for Nausea or Vomiting 9/23/18  Yes Otto Waite MD   Lancets MISC 1 each by Does not apply route daily 9/21/18  Yes Otto Waite MD   apixaban (ELIQUIS) 2.5 MG TABS tablet Take 1 tablet by mouth 2 times daily 10/1/21   APPLE Palomares - CNP   furosemide (LASIX) 40 MG tablet Take 1 tablet by mouth daily 9/22/21 10/22/21  Akhil Pickard MD   glucose monitoring kit (FREESTYLE) monitoring kit 1 each by Does not apply route once for 1 dose. 6/20/13 6/20/13  Ama Sage MD       Data:     CBC:   Recent Labs     07/24/22 2018   WBC 8.5   HGB 9.1*      MCV 88.4   RDW 12.7   LYMPHOPCT 16.4*   MONOPCT 7.8*   BASOPCT 0.5   MONOSABS 0.7   LYMPHSABS 1.4   EOSABS 0.2   BASOSABS 0.0     CMP:    Recent Labs     07/24/22 2018   *   K 5.2*      CO2 19*   BUN 70*   CREATININE 6.1*   GFRAA 12*   GLUCOSE 384*   LABALBU 2.2*   CALCIUM 6.7*   BILITOT 0.2   ALKPHOS 86   AST 9*   ALT 8*     Lipids:   Lab Results   Component Value Date/Time    CHOL 145 05/13/2021 10:28 AM    CHOL 168 10/15/2013 10:30 AM    HDL 29 05/13/2021 10:28 AM    TRIG 188 05/13/2021 10:28 AM     Hemoglobin A1C:   Lab Results   Component Value Date/Time    LABA1C 9.2 12/21/2021 04:25 PM     TSH: No results found for: TSH  Troponin:   Lab Results   Component Value Date/Time    TROPONINT 0.097 07/24/2022 08:18 PM    TROPONINT 0.044 12/21/2021 04:25 PM    TROPONINT 0.041 09/17/2021 07:20 PM     BNP:   Recent Labs     07/24/22 2018   PROBNP 10,098*     Lactic Acid: No results for input(s): LACTA in the last 72 hours.   UA:  Lab Results   Component Value Date/Time    NITRU NEGATIVE 07/24/2022 11:40 PM    COLORU YELLOW 07/24/2022 11:40 PM    PHUR 6.5 06/20/2013 09:51 AM    WBCUA 2 07/24/2022 11:40 PM    RBCUA 1 07/24/2022 11:40 PM    MUCUS RARE 07/24/2022 11:40 PM    TRICHOMONAS NONE SEEN 07/24/2022 11:40 PM    BACTERIA NEGATIVE 07/24/2022 11:40 PM    CLARITYU CLEAR 07/24/2022 11:40 PM    SPECGRAV 1.020 07/24/2022 11:40 PM    LEUKOCYTESUR NEGATIVE 07/24/2022 11:40 PM    UROBILINOGEN 0.2 07/24/2022 11:40 PM    BILIRUBINUR NEGATIVE 07/24/2022 11:40 PM    BILIRUBINUR neg 06/20/2013 09:51 AM    BLOODU SMALL NUMBER OR AMOUNT OBSERVED 07/24/2022 11:40 PM    GLUCOSEU 1,000 06/20/2013 09:51 AM    KETUA NEGATIVE 07/24/2022 11:40 PM    AMORPHOUS RARE 07/24/2022 11:40 PM     Urine Cultures: No results found for: LABURIN  Blood Cultures: No results found for: BC  No results found for: BLOODCULT2  Organism:   Lab Results   Component Value Date/Time    [facility] 01/07/2019 12:08 AM       Radiology results:  CT ABDOMEN PELVIS WO CONTRAST Additional Contrast? None   Final Result   No acute process in the abdomen or pelvis. CT CERVICAL SPINE WO CONTRAST   Final Result   No acute abnormality of the cervical spine. CT HEAD WO CONTRAST   Final Result   No acute intracranial abnormality. XR CHEST PORTABLE   Final Result   No acute cardiopulmonary abnormality.          US RETROPERITONEAL LIMITED    (Results Pending)       Medications:     Medications:    calcium gluconate  2,000 mg IntraVENous Once    insulin regular  10 Units SubCUTAneous Once        Infusions:    sodium chloride         PRN Meds:   morphine, 4 mg, Q30 Min PRN  ondansetron, 4 mg, Q30 Min PRN        Hannah Hickey MD  07/25/22 12:37 AM

## 2022-07-25 NOTE — CONSULTS
Neurology Service Consult Note  [facility]  Patient Name: Mack Littlejohn  : 1975        Subjective:   Reason for consult: Syncope  52 y.o. male with history of Anxiety, depression, DM II, Neuropathy, diverticulosis, HLD, HTN, Necrotizing fasciitis, pericarditis, CKD, CAD s/p PCI presenting to [facility] with complaints of syncopal episode. Patient reports that he moved from sitting to standing and became dizzy with fluttering in his chest and then fell with + LOC for about one minute and had loss of bowel control described as dark and sticky concerning for GIB He denies any confusion following this event. It was not witnessed. Patient reports a 2-3 week history of progressive weakness, fatigue, mid epigastric abdominal pain and poor oral intake however his wife at bedside states that he has been declining for months primarily because he stopped taking all of his medications (including Plavix and Lipitor). Daniel Sonia tells me that he stopped taking his medications because he is depressed. He states he lives in severe pain every day in his abdomen and his legs due to severe polyneuropathy. It is difficult for  him to ambulate in his home and he often is unable to complete simple tasks such as showering which further worsens his baseline depression. Patient was found to had acute on chronic kidney disease with creatinine of 6, hyperkalemia,  hyponatremia, hypocalcemia. Blood glucose has been elevated in the 300's. Per chart review, patient may need to be started on hemodialysis. Today the patient is alert and oriented x 4 with flat and depressed affect. He keeps his eyes closed while speaking. He is pleasant and cooperative and is able to follow all commands. He has a bifrontal headache with some sensitivity to light which he feels is because he quit smoking 8 days ago. Orthostatic vital signs were taken in the ED and are positive. He denies any history of seizure.  He does have generalized weakness which has been ongoing. He reports lower extremity neuropathy with pins and needles sensation from the foot to nearly the hip. Past Medical History:   Diagnosis Date    Abscess 2010    scrotal    Acute renal failure (ARF) ([address] 08/04/2019    Anxiety associated with depression     Anxiety associated with depression     Back pain 07/02/2012    Chest pain 05/01/2013    Diabetic nephropathy ([address]     Diabetic neuropathy ([address] 12/22/2011    Diabetic ulcer of left midfoot associated with type 2 diabetes mellitus, with fat layer exposed ([address] 07/18/2017    Diverticulosis     C scope + Dr. Kathi Conrad    DM (diabetes mellitus), type 2 ([address] 2002    DR. 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 Kathi Conrad    Necrotizing fasciitis [facility])     Nose fracture 1988    Panic attacks     Pericarditis 2003    Hospitalized with s/p heart cath normal    Peripheral autonomic neuropathy due to diabetes mellitus ([address]     Axonal EMG- NCS, March 2011    Sebaceous cyst 09/01/2011    URI (upper respiratory infection) 02/27/2012    WD-Wound, surgical, infected, initial encounter 12/08/2017    Wrist fracture 1986    :   Past Surgical History:   Procedure Laterality Date    [facility]  2003, 2013    Normal (Dr. Isabell Guzman)    COLONOSCOPY  6/2/2011    Pandiverticulosis, Nonbleeding internal hemorrhoids, Repeat colonoscopy at age 48- Dr. Stewart Collins Left 12/21/2021    Iris Calles performed by Shannon Salamanca DPM at [address] [facility]    \"had reconstruction surgery on nose a year after the other nose surgery\"    OTHER SURGICAL HISTORY Right 05/22/2015    I & D right great toe with partial amputation    OTHER SURGICAL HISTORY  12/04/2017    inc and drainage of abcess    AR DEEP INCIS FOOT BONE INFECTN Left 9/22/2018    LEFT FOOT DEBRIDEMENT INCISION AND DRAINAGE TOP AND BOTTOM performed by Tanisha Hdez DPM at [facility] N/A 5/15/2021    SCROTAL AND PERINEAL DEBRIDEMENT performed by Rito Mcfarlane MD at [place] [address] 5/16/2021    SCROTAL RE-DEBRIDEMENT  INCISION AND DRAINAGE performed by Rito Mcfarlane MD at [address] [place], [place] [address] [id number]    sebaceous cyst removal times 4     TOE AMPUTATION      right great toe    TOE AMPUTATION Right 3/23/2019    TOE AMPUTATION RIGHT 5TH TOE performed by Tanisha Hdez DPM at [address] Right 8/6/2019    TOE AMPUTATION RIGHT 4TH TOE performed by Tanisha Hdez DPM at [address] [facility] ENDOSCOPY  06/2/2011    Mild gastritis with moderatley severe antritis, small hiatal hernia, Dr. Omid Goldstein 1/12/2019    EGD BIOPSY performed by Maria Edward MD at [facility] ENDOSCOPY     Medications:  Scheduled Meds:   atorvastatin  40 mg Oral Nightly    [Held by provider] metoprolol tartrate  25 mg Oral BID    sertraline  100 mg Oral Daily    sodium chloride flush  5-40 mL IntraVENous 2 times per day    calcitRIOL  0.25 mcg Oral Daily    heparin (porcine)  5,000 Units SubCUTAneous 3 times per day    insulin NPH  15 Units SubCUTAneous Once    insulin glargine  30 Units SubCUTAneous Nightly    insulin lispro  15 Units SubCUTAneous TID WC    insulin lispro  0-8 Units SubCUTAneous TID WC    insulin lispro  0-8 Units SubCUTAneous 2 times per day    epoetin paola-epbx  10,000 Units IntraVENous Once per day on Mon Wed Fri    sodium chloride  500 mL IntraVENous Once    calcium gluconate-NaCl  1,000 mg IntraVENous Once     Continuous Infusions:   dextrose      sodium chloride       PRN Meds:.glucose, dextrose bolus **OR** dextrose bolus, glucagon (rDNA), dextrose, sodium chloride flush, sodium chloride, ondansetron **OR** ondansetron, polyethylene glycol, acetaminophen **OR** acetaminophen    Allergies   Allergen Reactions    Adhesive Tape Rash    Doxycycline Nausea And Vomiting    Reglan [Metoclopramide] Anxiety     Social History     Socioeconomic History    Marital status:      Spouse name: Not on file    Number of children: Not on file    Years of education: Not on file    Highest education level: Not on file   Occupational History    Not on file   Tobacco Use    Smoking status: Some Days     Years: 20.00     Types: Cigarettes    Smokeless tobacco: Never    Tobacco comments:     Smokes when drinking/social   Vaping Use    Vaping Use: Never used   Substance and Sexual Activity    Alcohol use: Not Currently     Comment: occ    Drug use: Yes     Frequency: 7.0 times per week     Types: Marijuana Maribel Jan)     Comment: 12/20/21 @ 2000    Sexual activity: Yes     Partners: Female   Other Topics Concern    Not on file   Social History Narrative    Not on file     Social Determinants of Health     Financial Resource Strain: Not on file   Food Insecurity: Not on file   Transportation Needs: Not on file   Physical Activity: Not on file   Stress: Not on file   Social Connections: Not on file   Intimate Partner Violence: Not on file   Housing Stability: Not on file      Family History   Problem Relation Age of Onset    Cancer Mother         ?site    Stroke Mother     Bleeding Prob Mother     Diabetes Father     Heart Disease Father     High Blood Pressure Father     Obesity Father     Kidney Disease Father     Diabetes Sister     High Blood Pressure Sister     Mental Illness Sister     Obesity Sister     High Blood Pressure Other     Mental Illness Other         bipolar    Other Son         cyst in ear canal    ADHD Daughter          ROS (10 systems)  In addition to that documented in the HPI above, the additional ROS was obtained:  Constitutional: Denies fevers or chills  Eyes: Denies vision changes  ENMT: Denies sore throat  CV: Denies chest pain  Resp: Denies SOB  GI: Denies vomiting or diarrhea  : Denies painful urination  MSK: Denies recent trauma  Skin: Denies new rashes  Neuro: Denies new numbness or tingling or weakness  Endocrine: Denies unexpected weight loss  Heme: Denies bleeding disorders    Physical Exam:       Wt Readings from Last 3 Encounters:   07/25/22 (!) 318 lb 9 oz (144.5 kg)   12/22/21 297 lb 6.4 oz (134.9 kg)   10/27/21 290 lb (131.5 kg)     Temp Readings from Last 3 Encounters:   07/25/22 97.7 °F (36.5 °C) (Oral)   05/24/22 98.1 °F (36.7 °C) (Oral)   03/15/22 98.7 °F (37.1 °C) (Temporal)     BP Readings from Last 3 Encounters:   07/25/22 132/83   05/24/22 (!) 140/87   03/15/22 (!) 164/100     Pulse Readings from Last 3 Encounters:   07/25/22 88   05/24/22 97   03/15/22 89        Gen: A&O x 4, NAD, cooperative  HEENT: NC/AT, EOMI, PERRL, mmm, no carotid bruits, neck supple, no meningeal signs  Heart: NSR  Lungs: Respirations even and unlabored  Ext: no edema, no calf tenderness b/l  Psych: Affect flat and depressed  Skin: no rashes or lesions    NEUROLOGIC EXAM:    Mental Status: A&O to self, location, month and year, NAD, speech clear, language fluent, repetition and naming intact, follows commands appropriately    Cranial Nerve Exam:   CN II-XII:  PERRL, VFF, no nystagmus, no gaze paresis, sensation V1-V3 intact b/l, muscles of facial expression symmetric; hearing intact to conversational tone, palate elevates symmetrically, shoulder elevation symmetric and tongue protrudes midline with movement side to side. Motor Exam:       Strength 5/5 UE's/LE's b/l  Tone and bulk normal   No pronator drift    Deep Tendon Reflexes: 2/4 biceps, triceps, brachioradialis, patellar, and achilles b/l; flexor plantar responses b/l    Sensation: Decreased in the bilateral lower extremities to touch, vibration and temperature right leg worse than left.      Coordination/Cerebellum:       Tremors--none      Rapidly alternating movements: no dysdiadochokinesia b/l                Heel-to-Shin: no dysmetria b/l      Finger-to-Nose: no dysmetria b/l    Gait and stance:      Gait: deferred      LABS:     Recent Labs     07/24/22 2018 07/25/22  0650 07/25/22  0914   WBC 8.5  --   --    * 135  --    K 5.2* 6.1*  --     102  --    CO2 19* 20*  --    BUN 70* 74*  --    CREATININE 6.1* 5.9*  --    GLUCOSE 384* 303*  --    INR  --   --  0.92            IMAGING:    CT HEAD W/O CONTRAST:  Impression   No acute intracranial abnormality. ASSESSMENT/PLAN:   52year old male with syncopal episode after moving from sitting to standing. Reports bifrontal headache today but denies any vision changes. EOM's are intact. Patient is A&O x 4. Speech is clear, language is fluent. Strength is intact to the upper and lower extremities with decreased sensation in the lower extremities. Syncopal episode secondary to orthostasis and multiple metabolic derangements superimposed on acute on chronic renal failure requiring dialysis and possible GIB. Low suspicion for seizure given patient description of event. CT head as above, non acute  A1C 10, K 6.1, Creat 5.9  Orthostatic vitals: Lying 160/118, sitting 151/103, standing 129/108  Do not feel we need to obtain EEG at this time. Recommend patient resume Plavix and atorvastatin for secondary stroke prevention given risk factors  Consider Psych consult. Patient is profoundly depressed. He has stopped taking medications, now possibly requiring dialysis. He tells me he is not sure how  he will \"handle\" being compliant with dialysis. Additionally may be some issues at home, wife was yelling at patient when I arrived to room and then abruptly left in the middle of exam.   PT/OT/ST  Follow up: If patient chooses, may follow up for evaluation of severe and advance peripheral neuropathy for EMG of lower extremities. No further recommendations, neurology will sign off.  Please contact us with any new concerns. > 80 minutes of time spent included chart review, obtaining history, patient examination, developing plan of care, and documentation. Patient was reviewed with attending neurologist Dr. Arnaldo Call. Thank you for allowing us to participate in the care of your patient. If there are any questions regarding evaluation please feel free to contact us. APPLE Cueto - CNS, 7/25/2022       ------------------------------------    Attending Note:  I have rounded on this patient with JAYSHREE Mcfarland. I have reviewed the chart and we have discussed this case in detail. The patient was seen and examined by myself. Pertinent labs and imaging have been personally reviewed. Our findings and impressions were discussed with the patient. I concur with the Nurse Specialist's assessment and plan. Low suspicion for seizure. Suspect metabolic derangements and orthostasis contributed to loss of consciousness. No further inpatient recommendations from our standpoint. He is welcome to follow-up outpatient for an EMG to evaluate his peripheral neuropathy.     Mervin Cuba DO 7/25/2022 6:15 PM

## 2022-07-25 NOTE — ANESTHESIA PRE PROCEDURE
Department of Anesthesiology  Preprocedure Note       Name:  Mack Littlejohn   Age:  52 y.o.  :  1975                                          MRN:  [id number]         Date:  2022      Surgeon: Carlitos Ortez):  Meredith Bonds MD    Procedure: Procedure(s):  EGD ESOPHAGOGASTRODUODENOSCOPY    Medications prior to admission:   Prior to Admission medications    Medication Sig Start Date End Date Taking?  Authorizing Provider   clopidogrel (PLAVIX) 75 MG tablet Take 1 tablet by mouth daily 10/2/21  Yes APPLE Fitch CNP   insulin glargine (LANTUS) 100 UNIT/ML injection vial Inject 75 Units into the skin nightly   Yes Historical Provider, MD   insulin aspart (NOVOLOG) 100 UNIT/ML injection vial Inject 35 Units into the skin 3 times daily (before meals)   Yes Historical Provider, MD   atorvastatin (LIPITOR) 40 MG tablet Take 1 tablet by mouth nightly 6/15/21  Yes Kirti Owens MD   metoprolol tartrate (LOPRESSOR) 25 MG tablet Take 1 tablet by mouth 2 times daily 6/15/21  Yes Kirti Owens MD   amLODIPine (NORVASC) 10 MG tablet Take 1 tablet by mouth daily 6/15/21  Yes Kirti Owens MD   chlorthalidone (HYGROTON) 25 MG tablet Take 1 tablet by mouth daily 6/15/21  Yes Kirti Owens MD   sertraline (ZOLOFT) 50 MG tablet Take 2 tablets by mouth daily 21  Yes Kirti Owens MD   aspirin 81 MG EC tablet Take 1 tablet by mouth daily 21  Yes Wyatt Hidalgo MD   linagliptin (TRADJENTA) 5 MG tablet Take 1 tablet by mouth daily 3/29/21  Yes Kirti Owens MD   ondansetron (ZOFRAN) 4 MG tablet Take 1 tablet by mouth every 8 hours as needed for Nausea or Vomiting 18  Yes Ronak Valenzuela MD   Lancets MISC 1 each by Does not apply route daily 18  Yes Ronak Valenzuela MD   apixaban (ELIQUIS) 2.5 MG TABS tablet Take 1 tablet by mouth 2 times daily 10/1/21   APPLE Fitch CNP   furosemide (LASIX) 40 MG tablet Take 1 tablet by mouth daily 9/22/21 10/22/21  Melania Norman MD   glucose monitoring kit (FREESTYLE) monitoring kit 1 each by Does not apply route once for 1 dose.  6/20/13 6/20/13  Roxy Carter MD       Current medications:    Current Facility-Administered Medications   Medication Dose Route Frequency Provider Last Rate Last Admin    atorvastatin (LIPITOR) tablet 40 mg  40 mg Oral Nightly Raghav Coyle MD        [Held by provider] metoprolol tartrate (LOPRESSOR) tablet 25 mg  25 mg Oral BID Raghav Coyle MD   25 mg at 07/25/22 1007    sertraline (ZOLOFT) tablet 100 mg  100 mg Oral Daily Raghav Coyle MD   100 mg at 07/25/22 1008    glucose chewable tablet 16 g  4 tablet Oral PRN Raghav Coyle MD        dextrose bolus 10% 125 mL  125 mL IntraVENous PRN Raghav Coyle MD        Or    dextrose bolus 10% 250 mL  250 mL IntraVENous PRN Raghav Coyle MD        glucagon (rDNA) injection 1 mg  1 mg SubCUTAneous PRN Raghav Coyle MD        dextrose 10 % infusion   IntraVENous Continuous PRN Raghav Coyle MD        sodium chloride flush 0.9 % injection 5-40 mL  5-40 mL IntraVENous 2 times per day Raghav Coyle MD   10 mL at 07/25/22 1007    sodium chloride flush 0.9 % injection 5-40 mL  5-40 mL IntraVENous PRN Raghav Coyle MD        0.9 % sodium chloride infusion   IntraVENous PRN Raghav Coyle MD        ondansetron (ZOFRAN-ODT) disintegrating tablet 4 mg  4 mg Oral Q8H PRN Raghav Coyle MD   4 mg at 07/25/22 1010    Or    ondansetron (ZOFRAN) injection 4 mg  4 mg IntraVENous Q6H PRKIMBERLEE Coyle MD   4 mg at 07/25/22 1028    polyethylene glycol (GLYCOLAX) packet 17 g  17 g Oral Daily PRKIMBERLEE Coyle MD        acetaminophen (TYLENOL) tablet 650 mg  650 mg Oral Q6H PRN Raghav Coyle MD   650 mg at 07/25/22 1007    Or    acetaminophen (TYLENOL) suppository 650 mg  650 mg Rectal Q6H PRKIMBERLEE Coyle MD        calcitRIOL (ROCALTROL) capsule 0.25 mcg  0.25 mcg Oral Daily Melody Cortez Jacob,    0.25 mcg at 07/25/22 1008    heparin (porcine) injection 5,000 Units  5,000 Units SubCUTAneous 3 times per day Heriberto Steve MD   5,000 Units at 07/25/22 1007    insulin NPH (HUMULIN N;NOVOLIN N) injection vial 15 Units  15 Units SubCUTAneous Once Bianka Russell MD        insulin glargine (LANTUS) injection vial 30 Units  30 Units SubCUTAneous Nightly Bianka Russell MD        insulin lispro (HUMALOG) injection vial 15 Units  15 Units SubCUTAneous TID  Bianka Russell MD   15 Units at 07/25/22 1251    insulin lispro (HUMALOG) injection vial 0-8 Units  0-8 Units SubCUTAneous TID  Bianka Russell MD   8 Units at 07/25/22 1251    insulin lispro (HUMALOG) injection vial 0-8 Units  0-8 Units SubCUTAneous 2 times per day Bianka Russell MD        epoetin paola-epbx (RETACRIT) injection 10,000 Units  10,000 Units IntraVENous Once per day on Mon Wed Fri Melody James DO   10,000 Units at 07/25/22 1531       Allergies: Allergies   Allergen Reactions    Adhesive Tape Rash    Doxycycline Nausea And Vomiting    Reglan [Metoclopramide] Anxiety       Problem List:    Patient Active Problem List   Diagnosis Code    Hiatal hernia K44.9    Diabetes mellitus type 2, improved controlled E11.65    Diabetic neuropathy ([facility]) E11.40    Panic attack F41.0    Anxiety associated with depression F41.8    Neck pain M54.2    DANIELA (obstructive sleep apnea) G47.33    Urinary frequency R35.0    Bilateral carpal tunnel syndrome- left worse than right G56.03    Hyperlipidemia with target LDL less than 100 E78.5    Essential hypertension I10    Bronchitis J40    Osteomyelitis ([facility]) M86.9    Abscess W43.78    Periumbilical hernia C43.8    Sepsis ([facility]) A41.9    Cellulitis of left foot L03. 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    Recurrent major depressive disorder, in full remission ([address] F33.42    Generalized anxiety disorder F41.1    Morbid obesity with body mass index (BMI) of 40.0 to 44.9 in adult ([facility]) E66.01, Z68.41    Necrotizing fasciitis ([facility]) M72.6    Syncope and collapse R55    Right groin wound S31.109A    Ulcer of right groin with fat layer exposed ([address] L98.492    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 type 2 diabetes mellitus, with muscle involvement without evidence of necrosis ([facility]) E11.621, L97.425    Nephrotic syndrome N04.9    Generalized edema R60.1    Stage 3b chronic kidney disease ([facility]) N18.32    Diabetic nephropathy associated with type 2 diabetes mellitus ([facility]) E11.21    Hypoalbuminemia E88.09    Chronic kidney disease, stage IV (severe) ([facility]) N18.4    FH: CAD (coronary artery disease) Z82.49    ASCVD (arteriosclerotic cardiovascular disease) I25.10    Other proteinuria R80.8    Chest pain R07.9    Surgical wound dehiscence T81.31XA    CARMEN (acute kidney injury) ([address] N17.9       Past Medical History:        Diagnosis Date    Abscess 2010    scrotal    Acute renal failure (ARF) ([address] 08/04/2019    Anxiety associated with depression     Anxiety associated with depression     Back pain 07/02/2012    Chest pain 05/01/2013    Diabetic nephropathy ([address]     Diabetic neuropathy ([address] 12/22/2011    Diabetic ulcer of left midfoot associated with type 2 diabetes mellitus, with fat layer exposed ([address] 07/18/2017    Diverticulosis     C scope + Dr. Therese Reyna DM (diabetes mellitus), type 2 ([address] 2002    DR. 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 John Nagy Necrotizing fasciitis [facility])     Nose fracture 1988    Panic attacks     Pericarditis 2003    Hospitalized with s/p heart cath normal    Peripheral autonomic neuropathy due to diabetes mellitus [facility]     Axonal EMG- NCS, March 2011    Sebaceous cyst 09/01/2011    URI (upper respiratory infection) 02/27/2012    WD-Wound, surgical, infected, initial encounter 12/08/2017    Wrist fracture 1986       Past Surgical History:        Procedure Laterality Date   [address] 2003, 2013    Normal (Dr. Anneliese Jarrett)    COLONOSCOPY  6/2/2011    Pandiverticulosis, Nonbleeding internal hemorrhoids, Repeat colonoscopy at age 48- Dr. Jj Galvez Left 12/21/2021    EXCISION OF HEAD LEFT 2ND METATARSAL performed by Page Boles DPM at [place] [address]    \"had reconstruction surgery on nose a year after the other nose surgery\"    OTHER SURGICAL HISTORY Right 05/22/2015    I & D right great toe with partial amputation    OTHER SURGICAL HISTORY  12/04/2017    inc and drainage of abcess    UT DEEP INCIS FOOT BONE INFECTN Left 9/22/2018    LEFT FOOT DEBRIDEMENT INCISION AND DRAINAGE TOP AND BOTTOM performed by Mark Caruso DPM at [facility] N/A 5/15/2021    SCROTAL AND PERINEAL DEBRIDEMENT performed by Tracy Kirk MD at [address] [place] [address] 5/16/2021    SCROTAL RE-DEBRIDEMENT  INCISION AND DRAINAGE performed by Tracy Kirk MD at [address] [place] [address]    sebaceous cyst removal times 4     TOE AMPUTATION      right great toe    TOE AMPUTATION Right 3/23/2019    TOE AMPUTATION RIGHT 5TH TOE performed by Mark Caruso DPM at [place] [address] Right 8/6/2019    TOE AMPUTATION RIGHT 4TH TOE performed by Mark [facility]uso DPM at [facility]  06/2/2011 Mild gastritis with moderatley severe antritis, small hiatal hernia, Dr. Madonna Hopson N/A 1/12/2019    EGD BIOPSY performed by Barbara Peñaloza MD at [facility] ENDOSCOPY       Social History:    Social History     Tobacco Use    Smoking status: Some Days     Years: 20.00     Types: Cigarettes    Smokeless tobacco: Never    Tobacco comments:     Smokes when drinking/social   Substance Use Topics    Alcohol use: Not Currently     Comment: occ                                Ready to quit: Not Answered  Counseling given: Not Answered  Tobacco comments: Smokes when drinking/social      Vital Signs (Current):   Vitals:    07/25/22 1530 07/25/22 1545 07/25/22 1600 07/25/22 1615   BP: 124/77 113/78 118/75 120/75   Pulse: 70 70 70 69   Resp:       Temp:       TempSrc:       SpO2:       Weight:       Height:                                                  BP Readings from Last 3 Encounters:   07/25/22 120/75   05/24/22 (!) 140/87   03/15/22 (!) 164/100       NPO Status:                                                                                 BMI:   Wt Readings from Last 3 Encounters:   07/25/22 (!) 318 lb 9 oz (144.5 kg)   12/22/21 297 lb 6.4 oz (134.9 kg)   10/27/21 290 lb (131.5 kg)     Body mass index is 47.04 kg/m².     CBC:   Lab Results   Component Value Date/Time    WBC 8.5 07/24/2022 08:18 PM    RBC 3.03 07/24/2022 08:18 PM    HGB 9.1 07/24/2022 08:18 PM    HCT 26.8 07/24/2022 08:18 PM    MCV 88.4 07/24/2022 08:18 PM    RDW 12.7 07/24/2022 08:18 PM     07/24/2022 08:18 PM       CMP:   Lab Results   Component Value Date/Time     07/25/2022 06:50 AM    K 6.1 07/25/2022 06:50 AM     07/25/2022 06:50 AM    CO2 20 07/25/2022 06:50 AM    BUN 74 07/25/2022 06:50 AM    CREATININE 5.9 07/25/2022 06:50 AM    GFRAA 12 07/25/2022 06:50 AM    LABGLOM 10 07/25/2022 06:50 AM    GLUCOSE 303 07/25/2022 06:50 AM    PROT 4.9 07/24/2022 08:18 PM    PROT 7.1 04/11/2012 09:31 AM    CALCIUM 7.1 07/25/2022 06:50 AM    BILITOT 0.2 07/24/2022 08:18 PM    ALKPHOS 86 07/24/2022 08:18 PM    AST 9 07/24/2022 08:18 PM    ALT 8 07/24/2022 08:18 PM       POC Tests:   Recent Labs     07/25/22  1253   POCGLU 270*       Coags:   Lab Results   Component Value Date/Time    PROTIME 11.8 07/25/2022 09:14 AM    PROTIME 12.5 12/13/2010 01:12 PM    INR 0.92 07/25/2022 09:14 AM    APTT 30.3 07/25/2022 09:14 AM       HCG (If Applicable): No results found for: PREGTESTUR, PREGSERUM, HCG, HCGQUANT     ABGs:   Lab Results   Component Value Date/Time    PO2ART 119 06/18/2013 01:15 PM    EBX5DRM 47.0 06/18/2013 01:15 PM    VVI9RHD 24.2 06/18/2013 01:15 PM    BEART 2 06/18/2013 01:15 PM        Type & Screen (If Applicable):  No results found for: LABABO, LABRH    Drug/Infectious Status (If Applicable):  Lab Results   Component Value Date/Time    HEPCAB NON REACTIVE 03/28/2021 09:06 AM       COVID-19 Screening (If Applicable):   Lab Results   Component Value Date/Time    COVID19 NOT DETECTED 12/17/2021 02:49 PM           Anesthesia Evaluation  Patient summary reviewed and Nursing notes reviewed no history of anesthetic complications:   Airway: Mallampati: III  TM distance: >3 FB   Neck ROM: full  Mouth opening: > = 3 FB   Dental:    (+) poor dentition      Pulmonary:   (+) sleep apnea:  decreased breath sounds current smoker                           Cardiovascular:  Exercise tolerance: poor (<4 METS),   (+) hypertension:, CAD:, CABG/stent:, hyperlipidemia            Echocardiogram reviewed         Beta Blocker:  Dose within 24 Hrs      ROS comment: Echo completed at bedside     Neuro/Psych:   (+) neuromuscular disease:, depression/anxiety              ROS comment: Recent frequent syncope and falls GI/Hepatic/Renal:   (+) hiatal hernia, renal disease: CRI, morbid obesity          Endo/Other:    (+) DiabetesType II DM, poorly controlled, using insulin, blood dyscrasia: anemia and anticoagulation therapy:., electrolyte abnormalities, . Abdominal:   (+) obese,           Vascular: negative vascular ROS. Other Findings:           Anesthesia Plan      MAC     ASA 4       Induction: intravenous. Anesthetic plan and risks discussed with patient. Plan discussed with CRNA. Pre Anesthesia Assessment complete. Chart reviewed on 7/25/2022. This is a chart review only.

## 2022-07-25 NOTE — CONSULTS
IV consult complete. Dwayne Randi 1.75\" Extra Long PIV inserted in right FA x1 with ultrasound guidance. Brisk blood return, flushes easy.

## 2022-07-25 NOTE — CARE COORDINATION
Case management consult for HD set up. LSW reviewed patient medical record. Patient Hep B surface antigen is active in process. LSW will sent the complete referral to Silvina Bruner once this lab has resulted.

## 2022-07-25 NOTE — PROGRESS NOTES
Removed 1.3 L. Retacrit given as ordered. Tx stopped early d/t patient request to use rest room. Patient Name: Magui Altman  Patient : 1975  MRN: [id number]     Acct: [de-identified]  Date of Admission: 2022  Room/Bed: 1119/1119-A  Code Status:  Full Code  Allergies:    Allergies   Allergen Reactions    Adhesive Tape Rash    Doxycycline Nausea And Vomiting    Reglan [Metoclopramide] Anxiety     Diagnosis:    Patient Active Problem List   Diagnosis    Hiatal hernia    Diabetes mellitus type 2, improved controlled    Diabetic neuropathy (HCC)    Panic attack    Anxiety associated with depression    Neck pain    DANIELA (obstructive sleep apnea)    Urinary frequency    Bilateral carpal tunnel syndrome- left worse than right    Hyperlipidemia with target LDL less than 100    Essential hypertension    Bronchitis    Osteomyelitis (HCC)    Abscess    Periumbilical hernia    Sepsis (HCC)    Cellulitis of left foot    Constipation    Intractable nausea and vomiting    Uncontrolled type 2 diabetes mellitus (HCC)    Nausea and vomiting    Diabetic foot infection (HCC)    Acute renal failure (ARF) (HCC)    Cellulitis    Cellulitis of left arm    Cellulitis, scrotum    Acute epididymitis    Irene gangrene    Recurrent major depressive disorder, in full remission ([address]    Generalized anxiety disorder    Morbid obesity with body mass index (BMI) of 40.0 to 44.9 in adult [facility])    Necrotizing fasciitis (HCC)    Syncope    Right groin wound    Ulcer of right groin with fat layer exposed ([address]    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 type 2 diabetes mellitus, with muscle involvement without evidence of necrosis (HCC)    Nephrotic syndrome    Generalized edema    Stage 3b chronic kidney disease (HCC)    Diabetic nephropathy associated with type 2 diabetes mellitus (HCC)    Hypoalbuminemia    Chronic kidney disease, stage IV (severe) (HCC)    FH: CAD (coronary artery disease)    ASCVD (arteriosclerotic cardiovascular disease)    Other proteinuria    Chest pain    Surgical wound dehiscence    CARMEN (acute kidney injury) ([address]    Anemia         Treatment:  Hemodilaysis 2:1  Priority: Routine  Location: Acute Room    Diabetic: Yes  NPO: No  Isolation Precautions: Dialysis     Consent for Treatment Verified: Yes  Blood Consent Verified: Not Applicable     Safety Verified: Identify (I), Consent (C), Equipment (E), HepB Status (B), Orders Complete (O), Access Verified (A), and Timeliness (T)  Time out performed prior to access at 1510 hours. Report Received from Primary RN at 1200 hours. Primary RN (First Initial, Last Name, Title): MEI Kowalski RN  Incapacitated Nurse Education Completed: Not Applicable     HBsAg ONLY:  Date Drawn: July 25, 2022       Results: Negative  HBsAb:  Date Drawn:  July 25, 2022       Results: Susceptible <10    Order  Dialysis Bath  K+ (Potassium): 2  Ca+ (Calcium):  (3.5)  Na+ (Sodium): 138  HCO3 (Bicarb): 30  Bicarbonate Concentrate Lot No.: [id number]  Acid Concentrate Lot No.: [id number]     Na+ Modeling: Not Applicable  Dialyzer: G005  Dialysate Temperature (C):  35  Blood Flow Rate (BFR):  250   Dialysate Flow Rate (DFR):   600        Access to be Utilized   Access:  Tunneled Catheter  Location: Internal Jugular  Side: Right   Needle gauge:  Not Applicable  + Bruit/Thrill: Not Applicable    First Use X-ray Verified: Yes  OK to use line order: Yes    Site Assessment:  Signs and Symptoms of Infection/Inflammation: None  If yes: Not Applicable  Dressing: Dry and Intact  Site Prep: Medical Aseptic Technique  Dressing Changed this Treatment: Yes  If yes, by whom: Special Procedures  Date of Last Dressing Change: Not Applicable  Antimicrobial Patch in place?: Yes  Red Alcohol Caps in place?: Yes  Gauze Dressing?: No  Non Dialysis Use?: No  Comment:    Flows: Good, Patent  If access problem, who was notified:     Pre and Post-Assessment  Patient Vitals for the past 8 hrs:   Level of Consciousness Oriented X Heart Rhythm Respiratory Quality/Effort O2 Device Bilateral Breath Sounds Skin Condition/Temp Abdomen Inspection Bowel Sounds (All Quadrants) Edema Pain Level   07/25/22 1036 Alert (0) -- -- -- -- -- -- -- -- -- --   07/25/22 1042 -- -- -- -- -- -- -- -- -- -- 8   07/25/22 1336 Alert (0) -- -- -- -- -- -- -- -- -- --   07/25/22 1436 Alert (0) -- Regular Unlabored None (Room air) Clear Dry; Warm Soft Active None 4   07/25/22 1800 Alert (0) 4 Regular Unlabored None (Room air) Clear Dry; Warm Soft Active None 2     Labs  Recent Labs     07/24/22 2018   WBC 8.5   HGB 9.1*   HCT 26.8*                                                                     Recent Labs     07/24/22 2018 07/25/22  0650   * 135   K 5.2* 6.1*    102   CO2 19* 20*   BUN 70* 74*   CREATININE 6.1* 5.9*   GLUCOSE 384* 303*     IV Drips and Rate/Dose   dextrose      sodium chloride        Safety - Before each treatment:   Dialysis Machine No.: [id number]   Machine Number: [id number]  Dialyzer Lot No.: [id number]  RO Machine Log Sheet Completed: Yes  Machine Alarm Self Test: Completed; Passed (07/25/22 1436)     Air Foam Detector: Tested, Proper Function, pH Reading  Extracorporeal Circuit Tested for Integrity: Yes  Machine Conductivity: 13.8  Manual Conductivity: 13.6     Bicarbonate Concentrate Lot No.: [id number]  Acid Concentrate Lot No.: [id number]  Manual Ph: 7.4  Bleach Test (Neg): Yes  Bath Temperature: 95 °F (35 °C)  Tubing Lot#: [id number]  Conductivity Meter Serial #: [id number]  All Connections Secure?: Yes  Venous Parameters Set?: Yes  Arterial Parameters Set?: Yes  Saline Line Double Clamped?: Yes  Air Foam Detector Engaged?: Yes  Machine Functioning Alarm Free?  Yes  Prime Given: 200ml    Chlorine Testing - Before each treatment and every 4 hours:   Treatment  Time On: 1520  Time Off: 1755  Weight: (!) 318 lb 9 oz (144.5 kg) (07/25/22 0515)  1st check: less than 0.1 ppm at: 1430 hours  2nd check: less than 0.1 ppm at: 1520 hours  3rd check: Not Applicable  (if greater than 0.1 ppm, then check every 30 minutes from secondary)    Access Flows and Pressures  Patient Vitals for the past 8 hrs:   Blood Flow Rate (ml/min) Ultrafiltration Rate (ml/hr) Arterial Pressure (mmHg) Venous Pressure (mmHg) TMP DFR Comments Access Visible   07/25/22 1520 250 ml/min 670 ml/hr -50 mmHg 70 50 600 tx initiated Yes   07/25/22 1530 250 ml/min 670 ml/hr -50 mmHg 70 50 600 lines secure Yes   07/25/22 1545 250 ml/min 670 ml/hr -60 mmHg 80 60 600 snoring Yes   07/25/22 1600 250 ml/min 670 ml/hr -50 mmHg 80 60 600 denies need Yes   07/25/22 1615 250 ml/min 670 ml/hr -60 mmHg 80 60 600 resting quietly Yes   07/25/22 1630 250 ml/min 670 ml/hr -60 mmHg 80 60 600 vss Yes   07/25/22 1645 250 ml/min 670 ml/hr -60 mmHg 80 60 600 no changes Yes   07/25/22 1700 250 ml/min 670 ml/hr -70 mmHg 90 60 600 bicarb changed Yes   07/25/22 1715 250 ml/min 670 ml/hr -70 mmHg 80 60 600 alert, calm Yes   07/25/22 1730 250 ml/min 670 ml/hr -70 mmHg 80 60 600 denies needs Yes   07/25/22 1745 250 ml/min 670 ml/hr -70 mmHg 100 60 600 resting Yes   07/25/22 1755 -- -- -- -- -- -- tx ended Yes     Vital Signs  Patient Vitals for the past 8 hrs:   BP Temp Pulse Resp SpO2   07/25/22 1112 -- -- -- 22 --   07/25/22 1336 (!) 161/94 -- 72 20 96 %   07/25/22 1343 (!) 136/98 -- 71 20 96 %   07/25/22 1436 (!) 127/92 -- 82 30 96 %   07/25/22 1520 109/75 -- 82 -- --   07/25/22 1530 124/77 -- 70 -- --   07/25/22 1545 113/78 -- 70 -- --   07/25/22 1600 118/75 -- 70 -- --   07/25/22 1615 120/75 -- 69 -- --   07/25/22 1630 (!) 140/92 -- 75 -- --   07/25/22 1645 110/67 -- 70 -- --   07/25/22 1700 110/73 -- 68 -- --   07/25/22 1715 116/75 -- 68 -- --   07/25/22 1730 119/74 -- 68 -- --   07/25/22 1745 101/72 -- 68 -- --   07/25/22 1755 119/87 -- 70 -- --   07/25/22 1800 130/89 97.2 °F (36.2 °C) 70 22 96 %     Post-Dialysis  Arterial Catheter Locking Solution:  NS  Venous Catheter Locking Solution: NS  Post-Treatment Procedures: Blood returned, Catheter Capped, clamped with Saline x2 ports  Machine Disinfection Process: Acid/Vinegar Clean, Exterior Machine Disinfection  Rinseback Volume (ml): 300 ml  Blood Volume Processed (Liters): 36.4 l/min  Dialyzer Clearance: Lightly streaked  Duration of Treatment (minutes): 155 minutes     Hemodialysis Intake (ml): 500 ml  Hemodialysis Output (ml): 1800 ml     Tolerated Treatment: Good  Patient Response to Treatment: no complaints          Provider Notification        Handoff complete and report given to Primary RN at 1814 hours. Primary RN (First Initial, Last Name, Title):  MEI Beckford RN     Education  Person Educated: Patient   Knowledge Base: None  Barriers to Learning?: None  Preferred method of Learning: Oral  Topic(s): Access Care, Signs and Symptoms of Infection, and Procedural   Teaching Tools: Explanation   Response to Education: Verbalized Understanding     Electronically signed by Nafisa Tee RN on 7/25/2022 at 6:14 PM

## 2022-07-26 ENCOUNTER — ANESTHESIA (OUTPATIENT)
Dept: ENDOSCOPY | Age: 47
DRG: 368 | End: 2022-07-26
Payer: MEDICARE

## 2022-07-26 ENCOUNTER — APPOINTMENT (OUTPATIENT)
Dept: INTERVENTIONAL RADIOLOGY/VASCULAR | Age: 47
DRG: 368 | End: 2022-07-26
Payer: MEDICARE

## 2022-07-26 LAB
ALBUMIN SERPL-MCNC: 2.2 GM/DL (ref 3.4–5)
ALP BLD-CCNC: 80 IU/L (ref 40–128)
ALT SERPL-CCNC: 7 U/L (ref 10–40)
ANION GAP SERPL CALCULATED.3IONS-SCNC: 13 MMOL/L (ref 4–16)
AST SERPL-CCNC: 8 IU/L (ref 15–37)
BASOPHILS ABSOLUTE: 0 K/CU MM
BASOPHILS RELATIVE PERCENT: 0.4 % (ref 0–1)
BILIRUB SERPL-MCNC: 0.2 MG/DL (ref 0–1)
BUN BLDV-MCNC: 47 MG/DL (ref 6–23)
CALCIUM SERPL-MCNC: 7.8 MG/DL (ref 8.3–10.6)
CHLORIDE BLD-SCNC: 106 MMOL/L (ref 99–110)
CO2: 20 MMOL/L (ref 21–32)
CREAT SERPL-MCNC: 4.8 MG/DL (ref 0.9–1.3)
DIFFERENTIAL TYPE: ABNORMAL
EKG ATRIAL RATE: 86 BPM
EKG ATRIAL RATE: 87 BPM
EKG ATRIAL RATE: 95 BPM
EKG DIAGNOSIS: NORMAL
EKG P AXIS: -5 DEGREES
EKG P AXIS: 28 DEGREES
EKG P AXIS: 43 DEGREES
EKG P-R INTERVAL: 162 MS
EKG P-R INTERVAL: 174 MS
EKG P-R INTERVAL: 174 MS
EKG Q-T INTERVAL: 360 MS
EKG Q-T INTERVAL: 380 MS
EKG Q-T INTERVAL: 384 MS
EKG QRS DURATION: 66 MS
EKG QRS DURATION: 82 MS
EKG QRS DURATION: 84 MS
EKG QTC CALCULATION (BAZETT): 452 MS
EKG QTC CALCULATION (BAZETT): 457 MS
EKG QTC CALCULATION (BAZETT): 459 MS
EKG R AXIS: 18 DEGREES
EKG R AXIS: 27 DEGREES
EKG R AXIS: 34 DEGREES
EKG T AXIS: 100 DEGREES
EKG T AXIS: 73 DEGREES
EKG T AXIS: 90 DEGREES
EKG VENTRICULAR RATE: 86 BPM
EKG VENTRICULAR RATE: 87 BPM
EKG VENTRICULAR RATE: 95 BPM
EOSINOPHILS ABSOLUTE: 0.1 K/CU MM
EOSINOPHILS RELATIVE PERCENT: 1.5 % (ref 0–3)
GFR AFRICAN AMERICAN: 16 ML/MIN/1.73M2
GFR NON-AFRICAN AMERICAN: 13 ML/MIN/1.73M2
GLUCOSE BLD-MCNC: 140 MG/DL (ref 70–99)
GLUCOSE BLD-MCNC: 144 MG/DL (ref 70–99)
GLUCOSE BLD-MCNC: 146 MG/DL (ref 70–99)
GLUCOSE BLD-MCNC: 219 MG/DL (ref 70–99)
GLUCOSE BLD-MCNC: 241 MG/DL (ref 70–99)
GLUCOSE BLD-MCNC: 282 MG/DL (ref 70–99)
HCT VFR BLD CALC: 27.3 % (ref 42–52)
HEMOGLOBIN: 8.9 GM/DL (ref 13.5–18)
HEPATITIS B CORE TOTAL ANTIBODY: NEGATIVE
IMMATURE NEUTROPHIL %: 0.9 % (ref 0–0.43)
LV EF: 53 %
LVEF MODALITY: NORMAL
LYMPHOCYTES ABSOLUTE: 1.2 K/CU MM
LYMPHOCYTES RELATIVE PERCENT: 16 % (ref 24–44)
MCH RBC QN AUTO: 29.2 PG (ref 27–31)
MCHC RBC AUTO-ENTMCNC: 32.6 % (ref 32–36)
MCV RBC AUTO: 89.5 FL (ref 78–100)
MONOCYTES ABSOLUTE: 0.5 K/CU MM
MONOCYTES RELATIVE PERCENT: 7 % (ref 0–4)
NUCLEATED RBC %: 0 %
PARATHYROID HORMONE INTACT: 307 PG/ML (ref 15–65)
PDW BLD-RTO: 12.8 % (ref 11.7–14.9)
PLATELET # BLD: 210 K/CU MM (ref 140–440)
PMV BLD AUTO: 10.3 FL (ref 7.5–11.1)
POTASSIUM SERPL-SCNC: 4.5 MMOL/L (ref 3.5–5.1)
RBC # BLD: 3.05 M/CU MM (ref 4.6–6.2)
SEGMENTED NEUTROPHILS ABSOLUTE COUNT: 5.6 K/CU MM
SEGMENTED NEUTROPHILS RELATIVE PERCENT: 74.2 % (ref 36–66)
SODIUM BLD-SCNC: 139 MMOL/L (ref 135–145)
TOTAL IMMATURE NEUTOROPHIL: 0.07 K/CU MM
TOTAL NUCLEATED RBC: 0 K/CU MM
TOTAL PROTEIN: 4.8 GM/DL (ref 6.4–8.2)
WBC # BLD: 7.5 K/CU MM (ref 4–10.5)

## 2022-07-26 PROCEDURE — 6370000000 HC RX 637 (ALT 250 FOR IP): Performed by: INTERNAL MEDICINE

## 2022-07-26 PROCEDURE — 6360000002 HC RX W HCPCS: Performed by: INTERNAL MEDICINE

## 2022-07-26 PROCEDURE — 6370000000 HC RX 637 (ALT 250 FOR IP): Performed by: STUDENT IN AN ORGANIZED HEALTH CARE EDUCATION/TRAINING PROGRAM

## 2022-07-26 PROCEDURE — 3700000000 HC ANESTHESIA ATTENDED CARE: Performed by: INTERNAL MEDICINE

## 2022-07-26 PROCEDURE — 80053 COMPREHEN METABOLIC PANEL: CPT

## 2022-07-26 PROCEDURE — 1200000000 HC SEMI PRIVATE

## 2022-07-26 PROCEDURE — 6360000002 HC RX W HCPCS

## 2022-07-26 PROCEDURE — 93010 ELECTROCARDIOGRAM REPORT: CPT | Performed by: INTERNAL MEDICINE

## 2022-07-26 PROCEDURE — 6360000002 HC RX W HCPCS: Performed by: STUDENT IN AN ORGANIZED HEALTH CARE EDUCATION/TRAINING PROGRAM

## 2022-07-26 PROCEDURE — 85025 COMPLETE CBC W/AUTO DIFF WBC: CPT

## 2022-07-26 PROCEDURE — 3609017100 HC EGD: Performed by: INTERNAL MEDICINE

## 2022-07-26 PROCEDURE — 2709999900 HC NON-CHARGEABLE SUPPLY: Performed by: INTERNAL MEDICINE

## 2022-07-26 PROCEDURE — 93308 TTE F-UP OR LMTD: CPT

## 2022-07-26 PROCEDURE — 0DJ08ZZ INSPECTION OF UPPER INTESTINAL TRACT, VIA NATURAL OR ARTIFICIAL OPENING ENDOSCOPIC: ICD-10-PCS | Performed by: INTERNAL MEDICINE

## 2022-07-26 PROCEDURE — 36415 COLL VENOUS BLD VENIPUNCTURE: CPT

## 2022-07-26 PROCEDURE — C9113 INJ PANTOPRAZOLE SODIUM, VIA: HCPCS | Performed by: INTERNAL MEDICINE

## 2022-07-26 PROCEDURE — 2580000003 HC RX 258: Performed by: STUDENT IN AN ORGANIZED HEALTH CARE EDUCATION/TRAINING PROGRAM

## 2022-07-26 PROCEDURE — 3700000001 HC ADD 15 MINUTES (ANESTHESIA): Performed by: INTERNAL MEDICINE

## 2022-07-26 PROCEDURE — 94761 N-INVAS EAR/PLS OXIMETRY MLT: CPT

## 2022-07-26 PROCEDURE — 90935 HEMODIALYSIS ONE EVALUATION: CPT

## 2022-07-26 PROCEDURE — 2580000003 HC RX 258: Performed by: INTERNAL MEDICINE

## 2022-07-26 PROCEDURE — 82962 GLUCOSE BLOOD TEST: CPT

## 2022-07-26 PROCEDURE — 2500000003 HC RX 250 WO HCPCS

## 2022-07-26 RX ORDER — OXYCODONE HYDROCHLORIDE 5 MG/1
5 TABLET ORAL EVERY 8 HOURS PRN
Status: DISCONTINUED | OUTPATIENT
Start: 2022-07-26 | End: 2022-08-03 | Stop reason: HOSPADM

## 2022-07-26 RX ORDER — LIDOCAINE HYDROCHLORIDE 20 MG/ML
INJECTION, SOLUTION EPIDURAL; INFILTRATION; INTRACAUDAL; PERINEURAL PRN
Status: DISCONTINUED | OUTPATIENT
Start: 2022-07-26 | End: 2022-07-26 | Stop reason: SDUPTHER

## 2022-07-26 RX ORDER — INSULIN LISPRO 100 [IU]/ML
0-4 INJECTION, SOLUTION INTRAVENOUS; SUBCUTANEOUS
Status: DISCONTINUED | OUTPATIENT
Start: 2022-07-26 | End: 2022-07-27

## 2022-07-26 RX ORDER — SUCRALFATE 1 G/1
1 TABLET ORAL
Status: DISCONTINUED | OUTPATIENT
Start: 2022-07-26 | End: 2022-08-03 | Stop reason: HOSPADM

## 2022-07-26 RX ORDER — PROPOFOL 10 MG/ML
INJECTION, EMULSION INTRAVENOUS PRN
Status: DISCONTINUED | OUTPATIENT
Start: 2022-07-26 | End: 2022-07-26 | Stop reason: SDUPTHER

## 2022-07-26 RX ORDER — SUCRALFATE ORAL 1 G/10ML
1 SUSPENSION ORAL
Status: DISCONTINUED | OUTPATIENT
Start: 2022-07-26 | End: 2022-07-26 | Stop reason: CLARIF

## 2022-07-26 RX ORDER — INSULIN LISPRO 100 [IU]/ML
0-4 INJECTION, SOLUTION INTRAVENOUS; SUBCUTANEOUS NIGHTLY
Status: DISCONTINUED | OUTPATIENT
Start: 2022-07-26 | End: 2022-07-27

## 2022-07-26 RX ADMIN — SODIUM CHLORIDE, PRESERVATIVE FREE 10 ML: 5 INJECTION INTRAVENOUS at 21:06

## 2022-07-26 RX ADMIN — ONDANSETRON 4 MG: 2 INJECTION INTRAMUSCULAR; INTRAVENOUS at 14:59

## 2022-07-26 RX ADMIN — PANTOPRAZOLE SODIUM 40 MG: 40 INJECTION, POWDER, FOR SOLUTION INTRAVENOUS at 20:59

## 2022-07-26 RX ADMIN — SERTRALINE HYDROCHLORIDE 100 MG: 50 TABLET ORAL at 09:02

## 2022-07-26 RX ADMIN — SUCRALFATE 1 G: 1 TABLET ORAL at 15:23

## 2022-07-26 RX ADMIN — CALCITRIOL CAPSULES 0.25 MCG 0.25 MCG: 0.25 CAPSULE ORAL at 09:02

## 2022-07-26 RX ADMIN — HEPARIN SODIUM 5000 UNITS: 5000 INJECTION INTRAVENOUS; SUBCUTANEOUS at 05:39

## 2022-07-26 RX ADMIN — OXYCODONE HYDROCHLORIDE 5 MG: 5 TABLET ORAL at 15:23

## 2022-07-26 RX ADMIN — METOPROLOL TARTRATE 25 MG: 25 TABLET, FILM COATED ORAL at 09:02

## 2022-07-26 RX ADMIN — PANTOPRAZOLE SODIUM 40 MG: 40 INJECTION, POWDER, FOR SOLUTION INTRAVENOUS at 09:59

## 2022-07-26 RX ADMIN — ONDANSETRON 4 MG: 4 TABLET, ORALLY DISINTEGRATING ORAL at 23:22

## 2022-07-26 RX ADMIN — SODIUM CHLORIDE, PRESERVATIVE FREE 10 ML: 5 INJECTION INTRAVENOUS at 09:02

## 2022-07-26 RX ADMIN — ATORVASTATIN CALCIUM 40 MG: 40 TABLET, FILM COATED ORAL at 20:58

## 2022-07-26 RX ADMIN — LIDOCAINE HYDROCHLORIDE 100 MG: 20 INJECTION, SOLUTION EPIDURAL; INFILTRATION; INTRACAUDAL; PERINEURAL at 08:08

## 2022-07-26 RX ADMIN — INSULIN GLARGINE 30 UNITS: 100 INJECTION, SOLUTION SUBCUTANEOUS at 20:59

## 2022-07-26 RX ADMIN — PROPOFOL 120 MG: 10 INJECTION, EMULSION INTRAVENOUS at 08:08

## 2022-07-26 RX ADMIN — ACETAMINOPHEN 650 MG: 325 TABLET ORAL at 23:21

## 2022-07-26 RX ADMIN — SODIUM CHLORIDE: 9 INJECTION, SOLUTION INTRAVENOUS at 07:02

## 2022-07-26 ASSESSMENT — PAIN DESCRIPTION - LOCATION
LOCATION: ABDOMEN;FOOT
LOCATION: ABDOMEN
LOCATION: ABDOMEN
LOCATION: LEG;FOOT

## 2022-07-26 ASSESSMENT — PAIN DESCRIPTION - FREQUENCY
FREQUENCY: CONTINUOUS
FREQUENCY: CONTINUOUS

## 2022-07-26 ASSESSMENT — PAIN - FUNCTIONAL ASSESSMENT: PAIN_FUNCTIONAL_ASSESSMENT: ACTIVITIES ARE NOT PREVENTED

## 2022-07-26 ASSESSMENT — PAIN DESCRIPTION - DESCRIPTORS
DESCRIPTORS: CRAMPING
DESCRIPTORS: ACHING;CRUSHING

## 2022-07-26 ASSESSMENT — PAIN DESCRIPTION - ORIENTATION
ORIENTATION: RIGHT;LEFT
ORIENTATION: RIGHT;LEFT

## 2022-07-26 ASSESSMENT — PAIN SCALES - GENERAL
PAINLEVEL_OUTOF10: 7
PAINLEVEL_OUTOF10: 7
PAINLEVEL_OUTOF10: 5
PAINLEVEL_OUTOF10: 6

## 2022-07-26 ASSESSMENT — LIFESTYLE VARIABLES: SMOKING_STATUS: 1

## 2022-07-26 NOTE — PROGRESS NOTES
V2.0  [facility] [place] Hospitalist Progress Note      Name:  Chris Watt /Age/Sex: 1975  (52 y.o. male)   MRN & CSN:  [id number] & [id number] Encounter Date/Time: 2022 10:46 AM EDT    Location:  [address] [place] [address] PCP: Prosper Cui MD       Hospital Day: 3    Assessment and Plan:   Chris Watt is a 52 y.o. 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 Contacted primary care physician Dr. Rigoberto Guidry reports that the patient was last seen in office in 2021 and was not on Eliquis. Will discontinue Eliquis completely from the chart. Once GI clears we can resume aspirin and Plavix to complete 12 months of DAPT post PCI.   Continue IV Protonix twice daily  GI following, appreciate recommendations  Monitor CBC and transfuse if hemoglobin less than 7    *End-stage renal disease on hemodialysis  -started HD 2022  Nephrology following, appreciate recommendations  Monitor BMP  Tunneled HD catheter before discharge, consulted interventional radiology  Case management working on outpatient HD set up pending hepatitis panel    *Type 2 diabetes with hyperglycemia  Basal insulin in addition to low-dose insulin sliding scale  Target blood glucose level 140-180  HbA1c 10%  Endocrinology following    *Intractable nausea/vomiting  -Improving  As needed antinausea medications    *Hyperkalemia  -Resolved after hemodialysis    Chronic conditions include:  *Coronary artery disease status post PCI in LAD (9/2021)  -Patient is on Plavix and statin outpatient  -Resume aspirin and Plavix after cleared by GI  *Hypertension:Hold antihypertensives in the setting of newly initiated hemodialysis  *Hyperlipidemia: Continue statin  *History of Irene's gangrene  *History of toe amputations    Diet ADULT DIET; Easy to Chew; 4 carb choices (60 gm/meal); Low Potassium (Less than 3000 mg/day)   DVT Prophylaxis [] Lovenox, []  Heparin, [x] SCDs, [] Ambulation,  [] Eliquis, [] Xarelto  [] Coumadin   Code Status Full Code   Disposition From: Home  Expected Disposition: Home  Estimated Date of Discharge: 7/27/2022  Patient requires continued admission due to tunneled HD catheter placement, outpatient HD set up and IV Protonix/GI clearance. Surrogate Decision Maker/ POA Wife     Subjective:     Chief Complaint: Loss of Consciousness (Did not hit head, no blood thinners)       Mack Littlejohn is a 52 y.o. male who presents with a syncopal episode. Patient was seen and evaluated at during hemodialysis. Patient reports mild nausea but states otherwise that his nausea/vomiting episodes have improved significantly since admission. Patient seems anxious about outcomes after this hospitalization. Detailed discussion regarding treatment plan which includes placement of a tunneled HD catheter and outpatient set up of hemodialysis. Patient apparently was supposed to be on Eliquis and Plavix but reports that he only takes Plavix. Upon chart review unsure about the reasoning of Eliquis. Objective:      Intake/Output Summary (Last 24 hours) at 7/26/2022 1311  Last data filed at 7/26/2022 0816  Gross per 24 hour   Intake 550 ml   Output 1800 ml   Net -1250 ml        Vitals:   Vitals:    07/26/22 1245   BP: 109/78   Pulse: 72   Resp:    Temp:    SpO2:        Physical Exam:   Physical Exam  Constitutional:       General: He is not in acute distress. Appearance: He is not ill-appearing, toxic-appearing or diaphoretic. HENT:      Head: Normocephalic and atraumatic. Nose: Nose normal.      Mouth/Throat:      Mouth: Mucous membranes are dry. Pharynx: Oropharynx is clear. Eyes:      Conjunctiva/sclera: Conjunctivae normal.      Pupils: Pupils are equal, round, and reactive to light. Neck:      Comments: HD catheter in place undergoing hemodialysis  Cardiovascular:      Rate and Rhythm: Normal rate and regular rhythm. Pulses: Normal pulses. Heart sounds: Normal heart sounds. No murmur heard. Pulmonary:      Effort: Pulmonary effort is normal.      Breath sounds: Normal breath sounds. Abdominal:      Tenderness: There is abdominal tenderness (Improved from admission). There is no guarding or rebound. Musculoskeletal:      Cervical back: Normal range of motion and neck supple. Right lower leg: No edema. Left lower leg: No edema. Comments: Multiple toe amputations   Skin:     General: Skin is warm and dry. Neurological:      General: No focal deficit present. Mental Status: He is oriented to person, place, and time.    Psychiatric:      Comments: Anxious          Medications:   Medications:    insulin lispro  0-4 Units SubCUTAneous TID WC    insulin lispro  0-4 Units SubCUTAneous Nightly    sucralfate  1 g Oral BID AC    atorvastatin  40 mg Oral Nightly    sertraline  100 mg Oral Daily    sodium chloride flush  5-40 mL IntraVENous 2 times per day    calcitRIOL  0.25 mcg Oral Daily    insulin glargine  30 Units SubCUTAneous Nightly    insulin lispro  15 Units SubCUTAneous TID WC    epoetin paola-epbx  10,000 Units IntraVENous Once per day on Mon Wed Fri    pantoprazole  40 mg IntraVENous BID      Infusions:    dextrose      sodium chloride 20 mL/hr at 07/26/22 0802     PRN Meds: glucose, 4 tablet, PRN  dextrose bolus, 125 mL, PRN   Or  dextrose bolus, 250 mL, PRN  glucagon (rDNA), 1 mg, PRN  dextrose, , Continuous PRN  sodium chloride flush, 5-40 mL, PRN  sodium chloride, , PRN  ondansetron, 4 mg, Q8H PRN   Or  ondansetron, 4 mg, Q6H PRN  polyethylene glycol, 17 g, Daily PRN  acetaminophen, 650 mg, Q6H PRN   Or  acetaminophen, 650 mg, Q6H PRN      Labs      Recent Results (from the past 24 hour(s))   Basic Metabolic Panel    Collection Time: 07/25/22  5:55 PM   Result Value Ref Range    Sodium 135 135 - 145 MMOL/L    Potassium 4.3 3.5 - 5.1 MMOL/L    Chloride 103 99 - 110 mMol/L    CO2 23 21 - 32 MMOL/L    Anion Gap 9 4 - 16    BUN 39 (H) 6 - 23 MG/DL    Creatinine 3.2 (H) 0.9 - 1.3 MG/DL    Glucose 147 (H) 70 - 99 MG/DL    Calcium 9.1 8.3 - 10.6 MG/DL    GFR Non- 21 (L) >60 mL/min/1.73m2    GFR  25 (L) >60 mL/min/1.73m2   POCT Glucose    Collection Time: 07/25/22  6:53 PM   Result Value Ref Range    POC Glucose 146 (H) 70 - 99 MG/DL   Basic Metabolic Panel    Collection Time: 07/25/22  8:48 PM   Result Value Ref Range    Sodium 136 135 - 145 MMOL/L    Potassium 5.1 3.5 - 5.1 MMOL/L    Chloride 104 99 - 110 mMol/L    CO2 19 (L) 21 - 32 MMOL/L    Anion Gap 13 4 - 16    BUN 49 (H) 6 - 23 MG/DL    Creatinine 4.3 (H) 0.9 - 1.3 MG/DL    Glucose 160 (H) 70 - 99 MG/DL    Calcium 8.1 (L) 8.3 - 10.6 MG/DL    GFR Non-African American 15 (L) >60 mL/min/1.73m2    GFR  18 (L) >60 mL/min/1.73m2   POCT Glucose    Collection Time: 07/25/22  8:53 PM   Result Value Ref Range    POC Glucose 161 (H) 70 - 99 MG/DL   Comprehensive Metabolic Panel w/ Reflex to MG    Collection Time: 07/26/22  2:30 AM   Result Value Ref Range    Sodium 139 135 - 145 MMOL/L    Potassium 4.5 3.5 - 5.1 MMOL/L    Chloride 106 99 - 110 mMol/L    CO2 20 (L) 21 - 32 MMOL/L    BUN 47 (H) 6 - 23 MG/DL    Creatinine 4.8 (H) 0.9 - 1.3 MG/DL    Glucose 144 (H) 70 - 99 MG/DL    Calcium 7.8 (L) 8.3 - 10.6 MG/DL    Albumin 2.2 (L) 3.4 - 5.0 GM/DL    Total Protein 4.8 (L) 6.4 - 8.2 GM/DL    Total Bilirubin 0.2 0.0 - 1.0 MG/DL    ALT 7 (L) 10 - 40 U/L    AST 8 (L) 15 - 37 IU/L    Alkaline Phosphatase 80 40 - 128 IU/L    GFR Non- 13 (L) >60 mL/min/1.73m2    GFR  16 (L) >60 mL/min/1.73m2    Anion Gap 13 4 - 16   CBC with Auto Differential    Collection Time: 07/26/22  2:30 AM   Result Value Ref Range    WBC 7.5 4.0 - 10.5 K/CU MM    RBC 3.05 (L) 4.6 - 6.2 M/CU MM    Hemoglobin 8.9 (L) 13.5 - 18.0 GM/DL    Hematocrit 27.3 (L) 42 - 52 %    MCV 89.5 78 - 100 FL    MCH 29.2 27 - 31 PG    MCHC 32.6 32.0 - 36.0 %    RDW 12.8 11.7 - 14.9 %    Platelets 402 595 - 725 K/CU MM    MPV 10.3 7.5 - 11.1 FL    Differential Type AUTOMATED DIFFERENTIAL     Segs Relative 74.2 (H) 36 - 66 %    Lymphocytes % 16.0 (L) 24 - 44 %    Monocytes % 7.0 (H) 0 - 4 %    Eosinophils % 1.5 0 - 3 %    Basophils % 0.4 0 - 1 %    Segs Absolute 5.6 K/CU MM    Lymphocytes Absolute 1.2 K/CU MM    Monocytes Absolute 0.5 K/CU MM    Eosinophils Absolute 0.1 K/CU MM    Basophils Absolute 0.0 K/CU MM    Nucleated RBC % 0.0 %    Total Nucleated RBC 0.0 K/CU MM    Total Immature Neutrophil 0.07 K/CU MM    Immature Neutrophil % 0.9 (H) 0 - 0.43 %   POCT Glucose    Collection Time: 07/26/22  4:10 AM   Result Value Ref Range    POC Glucose 146 (H) 70 - 99 MG/DL   POCT Glucose    Collection Time: 07/26/22  7:01 AM   Result Value Ref Range    POC Glucose 140 (H) 70 - 99 MG/DL        Imaging/Diagnostics Last 24 Hours   CT ABDOMEN PELVIS WO CONTRAST Additional Contrast? None    Result Date: 7/24/2022  EXAMINATION: CT OF THE ABDOMEN AND PELVIS WITHOUT CONTRAST 7/24/2022 9:58 pm TECHNIQUE: CT of the abdomen and pelvis was performed without the administration of intravenous contrast. Multiplanar reformatted images are provided for review.  Automated exposure control, iterative reconstruction, and/or weight based adjustment of the mA/kV was utilized to reduce the radiation dose to as low as reasonably achievable. COMPARISON: 08/11/2021 HISTORY: ORDERING SYSTEM PROVIDED HISTORY: pain TECHNOLOGIST PROVIDED HISTORY: Reason for exam:->pain Additional Contrast?->None Reason for Exam: pain FINDINGS: Lower Chest: Partially imaged coronary artery calcification. Organs: The liver is normal in contour and attenuation. Gallbladder is unremarkable. No biliary ductal dilatation. Pancreas, adrenals, kidneys, spleen are unremarkable. Mild calcific atherosclerosis. GI/Bowel: Left colonic diverticulosis. Bowel is nondilated without wall thickening. Appendix is normal. Pelvis: Unremarkable. Peritoneum/Retroperitoneum: No free air, free fluid, organized fluid collection, lymphadenopathy. Bones/Soft Tissues: No acute bone or soft tissue abnormality. No acute process in the abdomen or pelvis. CT HEAD WO CONTRAST    Result Date: 7/24/2022  EXAMINATION: CT OF THE HEAD WITHOUT CONTRAST  7/24/2022 9:57 pm TECHNIQUE: CT of the head was performed without the administration of intravenous contrast. Automated exposure control, iterative reconstruction, and/or weight based adjustment of the mA/kV was utilized to reduce the radiation dose to as low as reasonably achievable. COMPARISON: 11/25/2019 HISTORY: ORDERING SYSTEM PROVIDED HISTORY: syncope TECHNOLOGIST PROVIDED HISTORY: Has a \"code stroke\" or \"stroke alert\" been called? ->No Reason for exam:->syncope Decision Support Exception - unselect if not a suspected or confirmed emergency medical condition->Emergency Medical Condition (MA) Reason for Exam: syncope FINDINGS: BRAIN/VENTRICLES: There is no acute intracranial hemorrhage, mass effect or midline shift. No abnormal extra-axial fluid collection. The gray-white differentiation is maintained without evidence of an acute infarct. There is no evidence of hydrocephalus. ORBITS: The visualized portion of the orbits demonstrate no acute abnormality.  SINUSES: The visualized paranasal sinuses and mastoid air cells demonstrate no acute abnormality. SOFT TISSUES/SKULL:  No acute abnormality of the visualized skull or soft tissues. No acute intracranial abnormality. CT CERVICAL SPINE WO CONTRAST    Result Date: 7/24/2022  EXAMINATION: CT OF THE CERVICAL SPINE WITHOUT CONTRAST 7/24/2022 9:58 pm TECHNIQUE: CT of the cervical spine was performed without the administration of intravenous contrast. Multiplanar reformatted images are provided for review. Automated exposure control, iterative reconstruction, and/or weight based adjustment of the mA/kV was utilized to reduce the radiation dose to as low as reasonably achievable. COMPARISON: 07/09/2018 HISTORY: ORDERING SYSTEM PROVIDED HISTORY: fall TECHNOLOGIST PROVIDED HISTORY: Reason for exam:->fall Decision Support Exception - unselect if not a suspected or confirmed emergency medical condition->Emergency Medical Condition (MA) Reason for Exam: fall FINDINGS: BONES/ALIGNMENT: There is no acute fracture or traumatic malalignment. DEGENERATIVE CHANGES: No significant degenerative changes. SOFT TISSUES: There is no prevertebral soft tissue swelling. No acute abnormality of the cervical spine. XR CHEST PORTABLE    Result Date: 7/24/2022  EXAMINATION: ONE XRAY VIEW OF THE CHEST 7/24/2022 8:31 pm COMPARISON: 09/30/2021 HISTORY: ORDERING SYSTEM PROVIDED HISTORY: Chest pain TECHNOLOGIST PROVIDED HISTORY: Reason for exam:->Chest pain Reason for Exam: loss of consciousness Additional signs and symptoms: chest pain FINDINGS: Cardiomediastinal silhouette is unchanged in size. There is no pleural effusion or pneumothorax. There is no pulmonary consolidation. There is no acute osseous abnormality. No acute cardiopulmonary abnormality. US RETROPERITONEAL LIMITED    Result Date: 7/25/2022  EXAMINATION: ULTRASOUND OF THE KIDNEYS 7/24/2022 11:18 pm COMPARISON: None.  HISTORY: ORDERING SYSTEM PROVIDED HISTORY: García TECHNOLOGIST PROVIDED HISTORY: Reason for exam:->García Reason for Exam: garcía

## 2022-07-26 NOTE — CARE COORDINATION
Chart reviewed. Hep B total core antibody continues to pend. Once this result is completed I will make referral to [facility] and let them know he needs M/W/F rotation on [address] [facility].

## 2022-07-26 NOTE — PLAN OF CARE
Problem: Discharge Planning  Goal: Discharge to home or other facility with appropriate resources  Outcome: Progressing Towards Goal     Problem: Pain  Goal: Verbalizes/displays adequate comfort level or baseline comfort level  Outcome: Progressing Towards Goal     Problem: Safety - Adult  Goal: Free from fall injury  Outcome: Progressing Towards Goal

## 2022-07-26 NOTE — CONSULTS
Problem Relation Age of Onset    Cancer Mother         ?site    Stroke Mother     Bleeding Prob Mother     Diabetes Father     Heart Disease Father     High Blood Pressure Father     Obesity Father     Kidney Disease Father     Diabetes Sister     High Blood Pressure Sister     Mental Illness Sister     Obesity Sister     High Blood Pressure Other     Mental Illness Other         bipolar    Other Son         cyst in ear canal    ADHD Daughter      REVIEW OF SYSTEMS:  Review of System Done as noted above     PHYSICAL EXAM:      Vitals:    BP (!) 179/94   Pulse 77   Temp 98.4 °F (36.9 °C) (Oral)   Resp 20   Ht 5' 9\" (1.753 m)   Wt (!) 318 lb 9 oz (144.5 kg)   SpO2 97%   BMI 47.04 kg/m²     CONSTITUTIONAL:  awake, alert, cooperative, appears stated age   EYES:  vision intact Fundoscopic Exam not performed   ENT:Normal  NECK:  Supple, No JVD. Thyroid Exam:Normal   LUNGS:  Has Vesicular Breath Sounds,   CARDIOVASCULAR:  Normal apical impulse, regular rate and rhythm, normal S1 and S2, no S3 or S4, and has no  murmur   ABDOMEN:  No scars, normal bowel sounds, soft, non-distended, non-tender, no masses palpated, no hepatolienomegaly  Musculoskeletal: Normal  Extremities: Normal, peripheral pulses normal, , has no edema multiple to amputation  NEUROLOGIC:  Awake, alert, oriented to name, place and time. Cranial nerves II-XII are grossly intact. Motor is  intact. Sensory t\neuropathy.  ,  and gait is normal.    DATA:    CBC:   Recent Labs     07/24/22 2018   WBC 8.5   HGB 9.1*       CMP:  Recent Labs     07/24/22  2018 07/25/22  0650 07/25/22  1755   * 135 135   K 5.2* 6.1* 4.3    102 103   CO2 19* 20* 23   BUN 70* 74* 39*   CREATININE 6.1* 5.9* 3.2*   CALCIUM 6.7* 7.1* 9.1   PROT 4.9*  --   --    LABALBU 2.2*  --   --    BILITOT 0.2  --   --    ALKPHOS 86  --   --    AST 9*  --   --    ALT 8*  --   --      Lipids:   Lab Results   Component Value Date/Time    CHOL 145 05/13/2021 10:28 AM CHOL 168 10/15/2013 10:30 AM    HDL 29 05/13/2021 10:28 AM    TRIG 188 05/13/2021 10:28 AM     Glucose:   Recent Labs     07/25/22  1253 07/25/22  1853 07/25/22 2053   POCGLU 270* 146* 161*     Hemoglobin A1C:   Lab Results   Component Value Date/Time    LABA1C 10.0 07/25/2022 06:50 AM     Free T4:   Lab Results   Component Value Date/Time    T4FREE 1.25 07/24/2017 11:10 AM     Free T3: No results found for: FT3  TSH High Sensitivity:   Lab Results   Component Value Date/Time    TSHHS 3.860 07/25/2022 06:50 AM       XR CHEST PORTABLE   Final Result   Temper dialysis access catheter via right lower cervical approach with tip in   the mid right atrium. No complication suggested. Underlying findings consistent with congestive heart failure/hypervolemia s/p   or low lung volumes without detectable pleural effusion. US RETROPERITONEAL LIMITED   Final Result   Unremarkable ultrasound of the kidneys. CT ABDOMEN PELVIS WO CONTRAST Additional Contrast? None   Final Result   No acute process in the abdomen or pelvis. CT CERVICAL SPINE WO CONTRAST   Final Result   No acute abnormality of the cervical spine. CT HEAD WO CONTRAST   Final Result   No acute intracranial abnormality. XR CHEST PORTABLE   Final Result   No acute cardiopulmonary abnormality.          IR NONTUNNELED VASCULAR CATHETER > 5 YEARS    (Results Pending)          Scheduled Medicines   Medications:    atorvastatin  40 mg Oral Nightly    [Held by provider] metoprolol tartrate  25 mg Oral BID    sertraline  100 mg Oral Daily    sodium chloride flush  5-40 mL IntraVENous 2 times per day    calcitRIOL  0.25 mcg Oral Daily    heparin (porcine)  5,000 Units SubCUTAneous 3 times per day    insulin NPH  15 Units SubCUTAneous Once    insulin glargine  30 Units SubCUTAneous Nightly    insulin lispro  15 Units SubCUTAneous TID WC    insulin lispro  0-8 Units SubCUTAneous TID WC    insulin lispro  0-8 Units SubCUTAneous 2 times per day    epoetin paola-epbx  10,000 Units IntraVENous Once per day on Mon Wed Fri    pantoprazole  40 mg IntraVENous BID      Infusions:    dextrose      sodium chloride           IMPRESSION    Patient Active Problem List   Diagnosis    Hiatal hernia    Diabetes mellitus type 2, improved controlled    Diabetic neuropathy (HCC)    Panic attack    Anxiety associated with depression    Neck pain    DANIELA (obstructive sleep apnea)    Urinary frequency    Bilateral carpal tunnel syndrome- left worse than right    Hyperlipidemia with target LDL less than 100    Essential hypertension    Bronchitis    Osteomyelitis (HCC)    Abscess    Periumbilical hernia    Sepsis (HCC)    Cellulitis of left foot    Constipation    Intractable nausea and vomiting    Uncontrolled type 2 diabetes mellitus (HCC)    Nausea and vomiting    Diabetic foot infection ([address]    Acute renal failure (ARF) ([facility])    Cellulitis    Cellulitis of left arm    Cellulitis, scrotum    Acute epididymitis    Irene gangrene    Recurrent major depressive disorder, in full remission ([address]    Generalized anxiety disorder    Morbid obesity with body mass index (BMI) of 40.0 to 44.9 in [facility])    Necrotizing fasciitis ([address]    Syncope    Right groin wound    Ulcer of right groin with fat layer exposed ([address]    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 type 2 diabetes mellitus, with muscle involvement without evidence of necrosis ([facility])    Nephrotic syndrome    Generalized edema    Stage 3b chronic kidney disease ([address]    Diabetic nephropathy associated with type 2 diabetes mellitus (HCC)    Hypoalbuminemia    Chronic kidney disease, stage IV (severe) ([facility])    FH: CAD (coronary artery disease)    ASCVD (arteriosclerotic cardiovascular disease)    Other proteinuria    Chest pain    Surgical wound dehiscence    CARMEN (acute kidney injury) ([address]    Anemia         RECOMMENDATIONS:      Reviewed POC blood glucose .  Labs and X ray results   Reviewed Home and Current Medicines   Will Start On meal/ Correction bolus Humalog/ Lantus Insulin regime    Monitor Blood glucose frequently   Modify  the dose of Insulin as needed        Will follow with you  Again thank you for sharing pt's care with me.      Truly yours,       Monique Tovar MD

## 2022-07-26 NOTE — PROGRESS NOTES
REPORT CALLED TO THERON RN. PT STILL DROWSY BUT OPENS EYES AND TALKES WITH STAFF EASILY WHEN SPOKEN TO. VSS AND DENIES C/O OR NEEDS AT THIS TIME. Muscle Hinge Flap Text: The defect edges were debeveled with a #15 scalpel blade.  Given the size, depth and location of the defect and the proximity to free margins a muscle hinge flap was deemed most appropriate.  Using a sterile surgical marker, an appropriate hinge flap was drawn incorporating the defect. The area thus outlined was incised with a #15 scalpel blade.  The skin margins were undermined to an appropriate distance in all directions utilizing iris scissors.

## 2022-07-26 NOTE — PROGRESS NOTES
Anes progress note:    Pt presents for EGD for melena 2 days prior. Pt appears tired but appropriate  C/o chronic CP,chronic SOB    CRI with Cr 4.8  Pro BNP 10,098    149/88,81, 96% RA  RRR but distant HR, mid systolic click,  cannot r/o holosystolic murmur    Last echo last year so will rec: TTE prior to anes today r/o worsening CHF  Discussed with Dr Sierra Sanchez, not deemed emergent at this time    Rec: Echo as above, if WNL can reschedule later this morning      07:50 TTE EF Preserved, No RA Dilation, No valvular issues noted grossly.   [place] for [facility]

## 2022-07-26 NOTE — OP NOTE
Operative Note      Patient: Shama Bhatt  YOB: 1975  MRN: [id number]    Date of Procedure: 7/26/2022    Pre-Op Diagnosis: Melena [K92.1]    [facility]      BRIEF OP REPORT:    Impression:    1) Sandra-Roberson tear , no active bleed at this point   2) normal stomach, normal esophagus   3) normal duodenum        Suggest:   1) continue PPI twice daily, continue Carafate twice daily   2) advance diet as tolerated   3) hopefully nausea and vomiting we will resolve with correction of uremia with dialysis  Continue symptomatic treatment with antiemetics till then     Full EGD/COLONOSCOPY report available by going to \"chart review\" then \"procedures\" then  \"EGD/Colonoscopy\"  then \"View Endoscopy Report\"      Electronically signed by Jalil Bragg MD on 7/26/2022 at 8:17 AM

## 2022-07-26 NOTE — PROGRESS NOTES
Nephrology Progress Note        [address], [address], [place] [address]  Phone: [phone]  Office Hours: 8:30AM - 4:30PM  Monday - Friday 7/26/2022 9:22 AM  Subjective:   Admit Date: 7/24/2022  PCP: Kirti Owens MD  Interval History:     Diet: ADULT DIET; Easy to Chew      Data:   Scheduled Meds:   insulin lispro  0-4 Units SubCUTAneous TID WC    insulin lispro  0-4 Units SubCUTAneous Nightly    sucralfate  1 g Oral BID AC    atorvastatin  40 mg Oral Nightly    metoprolol tartrate  25 mg Oral BID    sertraline  100 mg Oral Daily    sodium chloride flush  5-40 mL IntraVENous 2 times per day    calcitRIOL  0.25 mcg Oral Daily    heparin (porcine)  5,000 Units SubCUTAneous 3 times per day    insulin glargine  30 Units SubCUTAneous Nightly    insulin lispro  15 Units SubCUTAneous TID WC    epoetin paola-epbx  10,000 Units IntraVENous Once per day on Mon Wed Fri    pantoprazole  40 mg IntraVENous BID     Continuous Infusions:   dextrose      sodium chloride 20 mL/hr at 07/26/22 0802     PRN Meds:glucose, dextrose bolus **OR** dextrose bolus, glucagon (rDNA), dextrose, sodium chloride flush, sodium chloride, ondansetron **OR** ondansetron, polyethylene glycol, acetaminophen **OR** acetaminophen  I/O last 3 completed shifts: In: 500   Out: 2300 [Emesis/NG output:500]  I/O this shift:   In: 50 [I.V.:50]  Out: -     Intake/Output Summary (Last 24 hours) at 7/26/2022 0922  Last data filed at 7/26/2022 0816  Gross per 24 hour   Intake 550 ml   Output 2300 ml   Net -1750 ml       CBC:   Recent Labs     07/24/22 2018 07/26/22  0230   WBC 8.5 7.5   HGB 9.1* 8.9*    210       BMP:    Recent Labs     07/25/22  1755 07/25/22 2048 07/26/22  0230    136 139   K 4.3 5.1 4.5    104 106   CO2 23 19* 20*   BUN 39* 49* 47*   CREATININE 3.2* 4.3* 4.8*   GLUCOSE 147* 160* 144*     Hepatic:   Recent Labs     07/24/22 2018 07/26/22  0230   AST 9* 8*   ALT 8* 7*   BILITOT 0.2 0.2 ALKPHOS 86 80     Troponin: No results for input(s): TROPONINI in the last 72 hours. BNP: No results for input(s): BNP in the last 72 hours. Lipids: No results for input(s): CHOL, HDL in the last 72 hours.     Invalid input(s): LDLCALCU  ABGs:   Lab Results   Component Value Date/Time    PO2ART 119 06/18/2013 01:15 PM    BZS9LWE 47.0 06/18/2013 01:15 PM     INR:   Recent Labs     07/25/22  0914   INR 0.92       Objective:   Vitals: BP (!) 159/83   Pulse 78   Temp 98.1 °F (36.7 °C) (Oral)   Resp 20   Ht 5' 9\" (1.753 m)   Wt (!) 318 lb 9 oz (144.5 kg)   SpO2 96%   BMI 47.04 kg/m²   General appearance: alert and cooperative with exam, in no acute distress  HEENT: normocephalic, atraumatic,   Neck: supple, trachea midline  Lungs: clear to auscultation bilaterally, breathing comfortably on room air  Heart[de-identified] regular rate and rhythm, S1, S2 normal,  Abdomen: soft, non-tender; non distended, +bowel sounds  Extremities: extremities atraumatic, no cyanosis or edema  Neurologic: alert, oriented, follows commands, interactive    Assessment and Plan:       Patient Active Problem List    Diagnosis Date Noted    CARMEN (acute kidney injury) ([facility] 07/25/2022    Anemia 07/25/2022    Recurrent major depressive disorder, in full remission ([facility] 05/18/2021    Generalized anxiety disorder 05/18/2021    Surgical wound dehiscence 02/08/2022    Chest pain 12/21/2021    Other proteinuria     FH: CAD (coronary artery disease) 09/29/2021    ASCVD (arteriosclerotic cardiovascular disease) 09/29/2021    Chronic kidney disease, stage IV (severe) ([facility] 09/28/2021    Nephrotic syndrome 09/22/2021    Generalized edema 09/22/2021    Stage 3b chronic kidney disease ([facility] 09/22/2021    Diabetic nephropathy associated with type 2 diabetes mellitus ([facility] 09/22/2021    Hypoalbuminemia 09/22/2021    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 type 2 diabetes mellitus, with muscle involvement without evidence of necrosis ([address] 09/21/2021    Right groin wound 06/09/2021    Ulcer of right groin with fat layer exposed ([address]     Syncope 06/05/2021    Necrotizing fasciitis ([address] 05/24/2021    Morbid obesity with body mass index (BMI) of 40.0 to 44.9 in adult [facility]) 05/21/2021    Irene gangrene     Cellulitis, scrotum 05/13/2021    Acute epididymitis     Cellulitis of left arm 04/03/2021    Cellulitis 03/23/2021    Acute renal failure (ARF) ([address] 08/04/2019    Diabetic foot infection ([address] 03/21/2019    Intractable nausea and vomiting 01/11/2019    Uncontrolled type 2 diabetes mellitus ([address] 01/11/2019    Nausea and vomiting 01/11/2019    Constipation 10/07/2018    Cellulitis of left foot     Sepsis ([address] 88/62/3812    Periumbilical hernia     Abscess 12/03/2017    Osteomyelitis ([address] 05/22/2015    Bronchitis 10/15/2013    Hyperlipidemia with target LDL less than 100 07/15/2013    Essential hypertension 07/15/2013    Bilateral carpal tunnel syndrome- left worse than right 06/24/2013    DANIELA (obstructive sleep apnea) 06/20/2013    Urinary frequency 06/20/2013    Neck pain 05/16/2013    Anxiety associated with depression     Panic attack 02/01/2012    Diabetic neuropathy ([address] 12/22/2011    Hiatal hernia 02/04/2011    Diabetes mellitus type 2, improved controlled 02/04/2011     Planning HD today  Discussed with him that he will be discharged on HD and the plan is to go to [facility] on [address] hd cath placement when IR ABLE TO                Electronically signed by Yesenia Lubin DO on 7/26/2022 at [address] Fowler MD  [facility] [address],  [address]  [place], [place] [address]  PHONE: [phone]  FAX: [phone]

## 2022-07-26 NOTE — DIALYSIS
Pt tolerated tx removed 1.5 liters    Patient Name: Qiana Childers  Patient : 1975  MRN: [id number]     Acct: [de-identified]  Date of Admission: 2022  Room/Bed: 1119/1119-A  Code Status:  Full Code  Allergies:    Allergies   Allergen Reactions    Adhesive Tape Rash    Doxycycline Nausea And Vomiting    Reglan [Metoclopramide] Anxiety     Diagnosis:    Patient Active Problem List   Diagnosis    Hiatal hernia    Diabetes mellitus type 2, improved controlled    Diabetic neuropathy (HCC)    Panic attack    Anxiety associated with depression    Neck pain    DANIELA (obstructive sleep apnea)    Urinary frequency    Bilateral carpal tunnel syndrome- left worse than right    Hyperlipidemia with target LDL less than 100    Essential hypertension    Bronchitis    Osteomyelitis (HCC)    Abscess    Periumbilical hernia    Sepsis (HCC)    Cellulitis of left foot    Constipation    Intractable nausea and vomiting    Uncontrolled type 2 diabetes mellitus (HCC)    Nausea and vomiting    Diabetic foot infection (HCC)    Acute renal failure (ARF) (HCC)    Cellulitis    Cellulitis of left arm    Cellulitis, scrotum    Acute epididymitis    Irene gangrene    Recurrent major depressive disorder, in full remission ([address]    Generalized anxiety disorder    Morbid obesity with body mass index (BMI) of 40.0 to 44.9 in adult [facility])    Necrotizing fasciitis (HCC)    Syncope    Right groin wound    Ulcer of right groin with fat layer exposed ([address]    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 type 2 diabetes mellitus, with muscle involvement without evidence of necrosis (HCC)    Nephrotic syndrome    Generalized edema    Stage 3b chronic kidney disease (HCC)    Diabetic nephropathy associated with type 2 diabetes mellitus (HCC)    Hypoalbuminemia    Chronic kidney disease, stage IV (severe) (HCC)    FH: CAD (coronary artery disease)    ASCVD (arteriosclerotic cardiovascular disease)    Other proteinuria    Chest pain Surgical wound dehiscence    CARMEN (acute kidney injury) ([facility]    Anemia         Treatment:  Hemodilaysis 2:1  Priority: Routine  Location: Acute Room    Diabetic: Yes  NPO: No  Isolation Precautions:dialysis     Consent for Treatment Verified: Yes  Blood Consent Verified: Not Applicable     Safety Verified: Identify (I), Consent (C), Equipment (E), HepB Status (B), Orders Complete (O), Access Verified (A), and Timeliness (T)  Time out performed prior to access at 1030 hours. Report Received from Primary RN at 1030 hours.   Primary RN (First Initial, Last Name, Title): Sheri Mahajan RN  Incapacitated Nurse Education Completed: Not Applicable     HBsAg ONLY:  Date Drawn: July 25, 2022       Results: neg  HBsAb:  Date Drawn:  July 25, 2022       Results: Susceptible <10    Order  Dialysis Bath  K+ (Potassium): 2  Ca+ (Calcium): 3 (3.5)  Na+ (Sodium): 138  HCO3 (Bicarb): 30  Bicarbonate Concentrate Lot No.: [id number]  Acid Concentrate Lot No.: [id number]       Comment:      Pre and Post-Assessment  Patient Vitals for the past 8 hrs:   Level of Consciousness Respiratory Quality/Effort O2 Device Abdomen Inspection Edema Pain Level   07/26/22 0545 Alert (0) -- None (Room air) -- -- --   07/26/22 0641 Alert (0) -- None (Room air) -- -- 5   07/26/22 0840 -- -- None (Room air) -- -- --   07/26/22 1003 Alert (0) Unlabored -- Soft None --     Labs  Recent Labs     07/24/22 2018 07/26/22  0230   WBC 8.5 7.5   HGB 9.1* 8.9*   HCT 26.8* 27.3*    210                                                                  Recent Labs     07/25/22  1755 07/25/22  2048 07/26/22  0230    136 139   K 4.3 5.1 4.5    104 106   CO2 23 19* 20*   BUN 39* 49* 47*   CREATININE 3.2* 4.3* 4.8*   GLUCOSE 147* 160* 144*     IV Drips and Rate/Dose   dextrose      sodium chloride 20 mL/hr at 07/26/22 0802      Safety - Before each treatment:   Dialysis Machine No.: [id number]  RO Machine Number: [id number]  Dialyzer Lot No.: [id number]  RO Machine Log Sheet Completed: Yes  Machine Alarm Self Test: Completed; Passed (07/26/22 1000)     Air Foam Detector: Tested, Proper Function  Extracorporeal Circuit Tested for Integrity: Yes  Machine Conductivity: 13.9  Manual Conductivity: 13.9     Bicarbonate Concentrate Lot No.: [id number]  Acid Concentrate Lot No.: [id number]  Manual Ph: 7.4  Bleach Test (Neg): Yes  Bath Temperature: 95 °F (35 °C)  Tubing Lot#: [id number]  Conductivity Meter Serial #: [id number]  All Connections Secure?: Yes  Venous Parameters Set?: Yes  Arterial Parameters Set?: Yes  Saline Line Double Clamped?: Yes  Air Foam Detector Engaged?: Yes  Machine Functioning Alarm Free?  Yes  Prime Given: 200ml    Chlorine Testing - Before each treatment and every 4 hours:   Treatment  Time On: 1012  Time Off: 1755  Weight: (!) 315 lb 4.1 oz (143 kg) (07/26/22 1012)  1st check: less than 0.1 ppm at: 0630 hours  2nd check: less than 0.1 ppm at: 1030 hours  3rd check: Not Applicable  (if greater than 0.1 ppm, then check every 30 minutes from secondary)    Access Flows and Pressures  Patient Vitals for the past 8 hrs:   Blood Flow Rate (ml/min) Ultrafiltration Rate (ml/hr) Arterial Pressure (mmHg) Venous Pressure (mmHg) TMP DFR Comments Access Visible   07/26/22 1012 250 ml/min 660 ml/hr -40 mmHg 70 40 600 txt started Yes   07/26/22 1041 350 ml/min 660 ml/hr -60 mmHg 80 40 600 lines secure Yes   07/26/22 1045 250 ml/min 660 ml/hr -50 mmHg 80 40 600 restign quietly Yes   07/26/22 1130 250 ml/min 660 ml/hr -100 mmHg 60 40 600 resting Yes   07/26/22 1145 250 ml/min 660 ml/hr -70 mmHg 60 30 600 pt reajusted bp cuff; pt tolerating Yes   07/26/22 1200 240 ml/min 660 ml/hr -10 mmHg 100 30 600 pt resting eyes closed Yes   07/26/22 1215 250 ml/min 660 ml/hr -60 mmHg 120 40 600 no distress Yes   07/26/22 1230 250 ml/min 660 ml/hr -60 mmHg 140 30 600 denies needs Yes     Vital Signs  Patient Vitals for the past 8 hrs:   BP Temp Pulse Resp SpO2 Weight Weight Method Percent Weight Change   07/26/22 0545 (!) 141/81 97.3 °F (36.3 °C) 77 20 95 % -- -- --   07/26/22 0641 137/81 97.1 °F (36.2 °C) 80 20 97 % -- -- --   07/26/22 0840 (!) 159/83 98.1 °F (36.7 °C) 78 20 96 % -- -- --   07/26/22 1012 128/87 97.7 °F (36.5 °C) 77 18 -- (!) 315 lb 4.1 oz (143 kg) Bed scale -1.04   07/26/22 1041 113/75 -- 73 -- -- -- -- --   07/26/22 1045 125/81 -- 72 -- -- -- -- --   07/26/22 1130 (!) 128/56 -- 70 -- -- -- -- --   07/26/22 1145 (!) 132/58 -- 78 -- -- -- -- --   07/26/22 1200 (!) 122/57 -- 70 -- -- -- -- --   07/26/22 1215 118/72 -- 73 -- -- -- -- --   07/26/22 1230 120/69 -- 72 -- -- -- -- --     Post-Dialysis  Arterial Catheter Locking Solution:  saline  Venous Catheter Locking Solution:  saline  Post-Treatment Procedures: Blood returned, Catheter Capped, clamped with Saline x2 ports  Machine Disinfection Process: Acid/Vinegar Clean, Exterior Machine Disinfection  Rinseback Volume (ml): 300 ml  Blood Volume Processed (Liters): 36.4 l/min  Dialyzer Clearance: Lightly streaked  Duration of Treatment (minutes): 155 minutes     Hemodialysis Intake (ml): 500 ml  Hemodialysis Output (ml): 1800 ml     Tolerated Treatment: Good  Patient Response to Treatment: no complaints          Provider Notification        Handoff complete and report given to Primary RN at 1329 hours.   Primary RN (First Initial, Last Name, Title):  Kelly Bettencourt rn     Education  Person Educated: Patient   Knowledge Base: Substantial  Barriers to Learning?: None  Preferred method of Learning: Hands-on  Topic(s): Access Care, Signs and Symptoms of Infection, Fluid Management, Albumin, Procedural, Medications, Treatment Options, Potassium, Diet, and Transplant   Teaching Tools: Video, Handout, Demonstration, and Explanation   Response to Education: Verbalized Understanding, Return Demonstration, Teach Back, and Requires Follow-up     Electronically signed by Evi Mackenzie RN on 7/26/2022 at [phone]

## 2022-07-26 NOTE — ANESTHESIA POSTPROCEDURE EVALUATION
Department of Anesthesiology  Postprocedure Note    Patient: Magui Altman  MRN: [id number]  YOB: 1975  Date of evaluation: 7/26/2022      Procedure Summary     Date: 07/26/22 Room / Location: [address]    Anesthesia Start: 0802 Anesthesia Stop: 0818    Procedure: EGD DIAGNOSTIC ONLY Diagnosis:       Melena      (Melena [K92.1])    Surgeons: Hannah Agustin MD Responsible Provider: Marisol Richards MD    Anesthesia Type: MAC ASA Status: 4          Anesthesia Type: No value filed.     Marcus Phase I:      Marcus Phase II:        Anesthesia Post Evaluation    Patient location during evaluation: bedside  Patient participation: complete - patient participated  Level of consciousness: awake  Airway patency: patent  Nausea & Vomiting: no nausea and no vomiting  Complications: no  Cardiovascular status: blood pressure returned to baseline  Respiratory status: acceptable and room air  Hydration status: euvolemic

## 2022-07-27 ENCOUNTER — APPOINTMENT (OUTPATIENT)
Dept: INTERVENTIONAL RADIOLOGY/VASCULAR | Age: 47
DRG: 368 | End: 2022-07-27
Payer: MEDICARE

## 2022-07-27 LAB
ALBUMIN SERPL-MCNC: 2.5 GM/DL (ref 3.4–5)
ALP BLD-CCNC: 82 IU/L (ref 40–129)
ALT SERPL-CCNC: 8 U/L (ref 10–40)
ANION GAP SERPL CALCULATED.3IONS-SCNC: 12 MMOL/L (ref 4–16)
AST SERPL-CCNC: 9 IU/L (ref 15–37)
BASOPHILS ABSOLUTE: 0.1 K/CU MM
BASOPHILS RELATIVE PERCENT: 0.8 % (ref 0–1)
BILIRUB SERPL-MCNC: 0.1 MG/DL (ref 0–1)
BUN BLDV-MCNC: 38 MG/DL (ref 6–23)
CALCIUM SERPL-MCNC: 7.8 MG/DL (ref 8.3–10.6)
CHLORIDE BLD-SCNC: 102 MMOL/L (ref 99–110)
CO2: 25 MMOL/L (ref 21–32)
CREAT SERPL-MCNC: 4.7 MG/DL (ref 0.9–1.3)
DIFFERENTIAL TYPE: ABNORMAL
EOSINOPHILS ABSOLUTE: 0.2 K/CU MM
EOSINOPHILS RELATIVE PERCENT: 2.8 % (ref 0–3)
GFR AFRICAN AMERICAN: 16 ML/MIN/1.73M2
GFR NON-AFRICAN AMERICAN: 13 ML/MIN/1.73M2
GLUCOSE BLD-MCNC: 116 MG/DL (ref 70–99)
GLUCOSE BLD-MCNC: 174 MG/DL (ref 70–99)
GLUCOSE BLD-MCNC: 195 MG/DL (ref 70–99)
GLUCOSE BLD-MCNC: 217 MG/DL (ref 70–99)
HCT VFR BLD CALC: 27.3 % (ref 42–52)
HEMOGLOBIN: 9 GM/DL (ref 13.5–18)
IMMATURE NEUTROPHIL %: 1.7 % (ref 0–0.43)
LYMPHOCYTES ABSOLUTE: 1.5 K/CU MM
LYMPHOCYTES RELATIVE PERCENT: 19.8 % (ref 24–44)
MCH RBC QN AUTO: 29.5 PG (ref 27–31)
MCHC RBC AUTO-ENTMCNC: 33 % (ref 32–36)
MCV RBC AUTO: 89.5 FL (ref 78–100)
MONOCYTES ABSOLUTE: 0.9 K/CU MM
MONOCYTES RELATIVE PERCENT: 11.5 % (ref 0–4)
NUCLEATED RBC %: 0 %
PDW BLD-RTO: 12.8 % (ref 11.7–14.9)
PLATELET # BLD: 221 K/CU MM (ref 140–440)
PMV BLD AUTO: 10.4 FL (ref 7.5–11.1)
POTASSIUM SERPL-SCNC: 4.3 MMOL/L (ref 3.5–5.1)
RBC # BLD: 3.05 M/CU MM (ref 4.6–6.2)
SEGMENTED NEUTROPHILS ABSOLUTE COUNT: 4.8 K/CU MM
SEGMENTED NEUTROPHILS RELATIVE PERCENT: 63.4 % (ref 36–66)
SODIUM BLD-SCNC: 139 MMOL/L (ref 135–145)
TOTAL IMMATURE NEUTOROPHIL: 0.13 K/CU MM
TOTAL NUCLEATED RBC: 0 K/CU MM
TOTAL PROTEIN: 4.7 GM/DL (ref 6.4–8.2)
WBC # BLD: 7.6 K/CU MM (ref 4–10.5)

## 2022-07-27 PROCEDURE — 36558 INSERT TUNNELED CV CATH: CPT

## 2022-07-27 PROCEDURE — 94761 N-INVAS EAR/PLS OXIMETRY MLT: CPT

## 2022-07-27 PROCEDURE — C9113 INJ PANTOPRAZOLE SODIUM, VIA: HCPCS | Performed by: INTERNAL MEDICINE

## 2022-07-27 PROCEDURE — 6360000002 HC RX W HCPCS: Performed by: INTERNAL MEDICINE

## 2022-07-27 PROCEDURE — 99222 1ST HOSP IP/OBS MODERATE 55: CPT | Performed by: INTERNAL MEDICINE

## 2022-07-27 PROCEDURE — 85025 COMPLETE CBC W/AUTO DIFF WBC: CPT

## 2022-07-27 PROCEDURE — 0JH63XZ INSERTION OF TUNNELED VASCULAR ACCESS DEVICE INTO CHEST SUBCUTANEOUS TISSUE AND FASCIA, PERCUTANEOUS APPROACH: ICD-10-PCS | Performed by: RADIOLOGY

## 2022-07-27 PROCEDURE — 6370000000 HC RX 637 (ALT 250 FOR IP): Performed by: INTERNAL MEDICINE

## 2022-07-27 PROCEDURE — 82962 GLUCOSE BLOOD TEST: CPT

## 2022-07-27 PROCEDURE — 77001 FLUOROGUIDE FOR VEIN DEVICE: CPT

## 2022-07-27 PROCEDURE — 6370000000 HC RX 637 (ALT 250 FOR IP): Performed by: STUDENT IN AN ORGANIZED HEALTH CARE EDUCATION/TRAINING PROGRAM

## 2022-07-27 PROCEDURE — 2580000003 HC RX 258: Performed by: INTERNAL MEDICINE

## 2022-07-27 PROCEDURE — 1200000000 HC SEMI PRIVATE

## 2022-07-27 PROCEDURE — 76937 US GUIDE VASCULAR ACCESS: CPT

## 2022-07-27 PROCEDURE — 80053 COMPREHEN METABOLIC PANEL: CPT

## 2022-07-27 PROCEDURE — 02H633Z INSERTION OF INFUSION DEVICE INTO RIGHT ATRIUM, PERCUTANEOUS APPROACH: ICD-10-PCS | Performed by: RADIOLOGY

## 2022-07-27 PROCEDURE — 36415 COLL VENOUS BLD VENIPUNCTURE: CPT

## 2022-07-27 RX ORDER — INSULIN LISPRO 100 [IU]/ML
0-8 INJECTION, SOLUTION INTRAVENOUS; SUBCUTANEOUS
Status: DISCONTINUED | OUTPATIENT
Start: 2022-07-27 | End: 2022-08-03 | Stop reason: HOSPADM

## 2022-07-27 RX ADMIN — SUCRALFATE 1 G: 1 TABLET ORAL at 06:50

## 2022-07-27 RX ADMIN — ONDANSETRON 4 MG: 4 TABLET, ORALLY DISINTEGRATING ORAL at 09:42

## 2022-07-27 RX ADMIN — PANTOPRAZOLE SODIUM 40 MG: 40 INJECTION, POWDER, FOR SOLUTION INTRAVENOUS at 21:14

## 2022-07-27 RX ADMIN — CALCITRIOL CAPSULES 0.25 MCG 0.25 MCG: 0.25 CAPSULE ORAL at 09:42

## 2022-07-27 RX ADMIN — SERTRALINE HYDROCHLORIDE 100 MG: 50 TABLET ORAL at 09:41

## 2022-07-27 RX ADMIN — PANTOPRAZOLE SODIUM 40 MG: 40 INJECTION, POWDER, FOR SOLUTION INTRAVENOUS at 09:58

## 2022-07-27 RX ADMIN — OXYCODONE HYDROCHLORIDE 5 MG: 5 TABLET ORAL at 09:41

## 2022-07-27 RX ADMIN — ATORVASTATIN CALCIUM 40 MG: 40 TABLET, FILM COATED ORAL at 20:44

## 2022-07-27 RX ADMIN — SODIUM CHLORIDE, PRESERVATIVE FREE 10 ML: 5 INJECTION INTRAVENOUS at 21:14

## 2022-07-27 RX ADMIN — INSULIN GLARGINE 30 UNITS: 100 INJECTION, SOLUTION SUBCUTANEOUS at 20:43

## 2022-07-27 RX ADMIN — INSULIN LISPRO 15 UNITS: 100 INJECTION, SOLUTION INTRAVENOUS; SUBCUTANEOUS at 12:51

## 2022-07-27 RX ADMIN — OXYCODONE HYDROCHLORIDE 5 MG: 5 TABLET ORAL at 17:24

## 2022-07-27 RX ADMIN — SUCRALFATE 1 G: 1 TABLET ORAL at 17:17

## 2022-07-27 RX ADMIN — INSULIN LISPRO 2 UNITS: 100 INJECTION, SOLUTION INTRAVENOUS; SUBCUTANEOUS at 12:50

## 2022-07-27 ASSESSMENT — PAIN DESCRIPTION - ORIENTATION
ORIENTATION: UPPER
ORIENTATION: RIGHT
ORIENTATION: RIGHT

## 2022-07-27 ASSESSMENT — PAIN DESCRIPTION - DESCRIPTORS
DESCRIPTORS: BURNING;STABBING
DESCRIPTORS: STABBING
DESCRIPTORS: PRESSURE
DESCRIPTORS: STABBING;BURNING

## 2022-07-27 ASSESSMENT — PAIN SCALES - GENERAL
PAINLEVEL_OUTOF10: 8
PAINLEVEL_OUTOF10: 7

## 2022-07-27 ASSESSMENT — PAIN DESCRIPTION - LOCATION
LOCATION: NECK
LOCATION: ABDOMEN;LEG;FOOT
LOCATION: INCISION

## 2022-07-27 NOTE — PROGRESS NOTES
Progress Note( Dr. Amy Serrano)  7/27/2022  Subjective:   Admit Date: 7/24/2022  PCP: Ari Graham MD    Admitted For :Generalized weakness and syncope       Consulted For:  Better control of blood glucose    Interval History: Had EGD done this morning shows  ) Sandra-Roberson tear , no active bleed at this point                2) normal stomach, normal esophagus                3) normal duodenum  Also patient had hemodialysis yesterday and again today    Denies any chest pains,   Denies SOB . Denies nausea or vomiting. No new bowel or bladder symptoms. Intake/Output Summary (Last 24 hours) at 7/27/2022 0844  Last data filed at 7/26/2022 2303  Gross per 24 hour   Intake 500 ml   Output 2252 ml   Net -1752 ml         DATA    CBC:   Recent Labs     07/24/22 2018 07/26/22  0230 07/27/22  0530   WBC 8.5 7.5 7.6   HGB 9.1* 8.9* 9.0*    210 221      CMP:  Recent Labs     07/24/22 2018 07/25/22  0650 07/25/22 2048 07/26/22  0230 07/27/22  0530   *   < > 136 139 139   K 5.2*   < > 5.1 4.5 4.3      < > 104 106 102   CO2 19*   < > 19* 20* 25   BUN 70*   < > 49* 47* 38*   CREATININE 6.1*   < > 4.3* 4.8* 4.7*   CALCIUM 6.7*   < > 8.1* 7.8* 7.8*   PROT 4.9*  --   --  4.8* 4.7*   LABALBU 2.2*  --   --  2.2* 2.5*   BILITOT 0.2  --   --  0.2 0.1   ALKPHOS 86  --   --  80 82   AST 9*  --   --  8* 9*   ALT 8*  --   --  7* 8*    < > = values in this interval not displayed.        Lipids:   Lab Results   Component Value Date/Time    CHOL 145 05/13/2021 10:28 AM    CHOL 168 10/15/2013 10:30 AM    HDL 29 05/13/2021 10:28 AM    TRIG 188 05/13/2021 10:28 AM     Glucose:  Recent Labs     07/26/22  1351 07/26/22  1549 07/26/22 1956   POCGLU 219* 282* 241*       [id number]:  Lab Results   Component Value Date/Time    LABA1C 10.0 07/25/2022 06:50 AM     High Sensitivity TSH:   Lab Results   Component Value Date/Time    TSHHS 3.860 07/25/2022 06:50 AM     Free T3: No results found for: FT3  Free T4:  Lab Results   Component Value Date/Time    T4FREE 1.25 07/24/2017 11:10 AM       XR CHEST PORTABLE   Final Result   Temper dialysis access catheter via right lower cervical approach with tip in   the mid right atrium. No complication suggested. Underlying findings consistent with congestive heart failure/hypervolemia s/p   or low lung volumes without detectable pleural effusion. US RETROPERITONEAL LIMITED   Final Result   Unremarkable ultrasound of the kidneys. CT ABDOMEN PELVIS WO CONTRAST Additional Contrast? None   Final Result   No acute process in the abdomen or pelvis. CT CERVICAL SPINE WO CONTRAST   Final Result   No acute abnormality of the cervical spine. CT HEAD WO CONTRAST   Final Result   No acute intracranial abnormality. XR CHEST PORTABLE   Final Result   No acute cardiopulmonary abnormality.          IR NONTUNNELED VASCULAR CATHETER > 5 YEARS    (Results Pending)   IR TUNNELED CVC PLACE WO SQ PORT/PUMP > 5 YEARS    (Results Pending)        Scheduled Medicines   Medications:    insulin lispro  0-8 Units SubCUTAneous TID WC    insulin lispro  0-8 Units SubCUTAneous 2 times per day    sucralfate  1 g Oral BID AC    atorvastatin  40 mg Oral Nightly    sertraline  100 mg Oral Daily    sodium chloride flush  5-40 mL IntraVENous 2 times per day    calcitRIOL  0.25 mcg Oral Daily    insulin glargine  30 Units SubCUTAneous Nightly    insulin lispro  15 Units SubCUTAneous TID WC    epoetin paola-epbx  10,000 Units IntraVENous Once per day on Mon Wed Fri    pantoprazole  40 mg IntraVENous BID      Infusions:    dextrose      sodium chloride 20 mL/hr at 07/26/22 0802         Objective:   Vitals: BP (!) 149/75   Pulse 75   Temp 98.6 °F (37 °C) (Oral)   Resp 16   Ht 5' 9\" (1.753 m)   Wt (!) 315 lb 14.7 oz (143.3 kg)   SpO2 95%   BMI 46.65 kg/m²   General appearance: alert and cooperative with exam  Neck: no JVD or bruit  Thyroid : Normal lobes   Lungs: Has Vesicular Breath sounds   Heart:  regular rate and rhythm  Abdomen: soft, non-tender; bowel sounds normal; no masses,  no organomegaly  Musculoskeletal: Normal  Extremities: extremities normal, , bilateral leg edema  Neurologic:  Awake, alert, oriented to name, place and time. Cranial nerves II-XII are grossly intact. Motor is  intact.   Sensory neuropathy,  and gait is normal.    Assessment:     Patient Active Problem List:     Hiatal hernia     Diabetes mellitus type 2, improved controlled     Diabetic neuropathy (HCC)     Panic attack     Anxiety associated with depression     Neck pain     DANIELA (obstructive sleep apnea)     Urinary frequency     Bilateral carpal tunnel syndrome- left worse than right     Hyperlipidemia with target LDL less than 100     Essential hypertension     Bronchitis     Osteomyelitis (HCC)     Abscess     Periumbilical hernia     Sepsis (HCC)     Cellulitis of left foot     Constipation     Intractable nausea and vomiting     Uncontrolled type 2 diabetes mellitus (HCC)     Nausea and vomiting     Diabetic foot infection ([address]     Acute renal failure (ARF) ([facility])     Cellulitis     Cellulitis of left arm     Cellulitis, scrotum     Acute epididymitis     Irene gangrene     Recurrent major depressive disorder, in full remission ([address]     Generalized anxiety disorder     Morbid obesity with body mass index (BMI) of 40.0 to 44.9 in [facility])     Necrotizing fasciitis ([address]     Syncope     Right groin wound     Ulcer of right groin with fat layer exposed ([address]     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 type 2 diabetes mellitus, with muscle involvement without evidence of necrosis ([facility])     Nephrotic syndrome     Generalized edema     Stage 3b chronic kidney disease ([address]     Diabetic nephropathy associated with type 2 diabetes mellitus (HCC)     Hypoalbuminemia     Chronic kidney disease, stage IV (severe) ([facility])     FH: CAD (coronary artery disease)     ASCVD (arteriosclerotic cardiovascular disease)     Other proteinuria     Chest pain     Surgical wound dehiscence     CARMEN (acute kidney injury) ([facility]     Anemia      Plan:     Reviewed POC blood glucose . Labs and X ray results   Reviewed Current Medicines   On meal/ Correction bolus Humalog/ Basal Lantus Insulin regime  Monitor Blood glucose frequently   Modified  the dose of Insulin/ other medicines as needed   Discussed with nephrology patient going to have long-term hemodialysis  Will follow     .      Merline Creighton, MD, MD

## 2022-07-27 NOTE — OR NURSING
Tunneled HD Catheter insertion by Dr Kimberlee Childers    PT TRANSPORTED FROM:   1118                                 TO THE IR ROOM:   Large       BARRIER PRECAUTIONS & STERILE TECHNIQUE:               Pt placed on VS Monitor. Pt supine, prepped and draped in a sterile fashion with chlorhexadine.     PAIN/LOCAL ANESTHESIA/SEDATION MANAGEMENT:           Local: Lidocaine 1% given by Dr          Sedation: None             Fentanyl:             Versed:     INTRAOPERATIVE:           ACCESS TIME:           US/FLUORO: Both used          WIRE USED:           SHEATH USED:           CATHETER USED:           FINAL IMAGE TAKEN TO CONFIRM PLACEMENT OF:           CONTRAST/CC:   Catheter sutured in place by Dr Dax Nava: Ronda Rias with tegaderm applied to chest site; gauze with tegaderm applied to neck site    SPECIMENS: NA    EBL:    <6EB        COMPLICATIONS/ OUTCOME:   None; tolerated procedure well    REPORT CALLED TO: Attempted to call pt nurse and charge nurse; no answer

## 2022-07-27 NOTE — PROGRESS NOTES
[address] [facility] - [facility]       Progress Note       2022  2:29 PM    Patient:    Ti Chris  : 1975   52 y.o.              MRN: [id number]  Admitted: 2022  7:49 PM ATT: Hiram Mendoza MD   [id number]/[id number]  AdmitDx: Hypocalcemia [E83.51]  Syncope and collapse [R55]  Hyperglycemia [R73.9]  Elevated troponin [R77.8]  CARMEN (acute kidney injury) ([facility] [address] [N17.9]  PCP: Cabrera Contreras MD    ASSESSMENT AND PLAN :  GI bleed probably secondary to Sandra-Roberson tear  Resolved  Nausea and vomiting improved with hemodialysis  Chronic renal failure continue hemodialysis  Continue PPI twice daily  Advance diet as tolerated  We will sign off call if needed    Patient Active Problem List   Diagnosis Code    Hiatal hernia K44.9    Diabetes mellitus type 2, improved controlled E11.65    Diabetic neuropathy ([facility] [address] E11.40    Panic attack F41.0    Anxiety associated with depression F41.8    Neck pain M54.2    DANIELA (obstructive sleep apnea) G47.33    Urinary frequency R35.0    Bilateral carpal tunnel syndrome- left worse than right G56.03    Hyperlipidemia with target LDL less than 100 E78.5    Essential hypertension I10    Bronchitis J40    Osteomyelitis ([facility]) M86.9    Abscess P35.51    Periumbilical hernia B53.8    Sepsis ([facility] [address] A41.9    Cellulitis of left foot L03.116    Constipation K59.00    Intractable nausea and vomiting R11.2    Uncontrolled type 2 diabetes mellitus (HCC) E11.65    Nausea and vomiting R11.2    Diabetic foot infection ([facility]) E11.628, L08.9    Acute renal failure (ARF) ([facility]) N17.9    Cellulitis L03.90    Cellulitis of left arm L03.114    Cellulitis, scrotum N49.2    Acute epididymitis N45.1    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621, L97.425    Nephrotic syndrome N04.9    Generalized edema R60.1    Stage 3b chronic kidney disease (HCC) N18.32    Diabetic nephropathy associated with type 2 diabetes mellitus (HCC) E11.21    Hypoalbuminemia E88.09    Chronic kidney disease, stage IV (severe) (HCC) N18.4    FH: CAD (coronary artery disease) Z82.49    ASCVD (arteriosclerotic cardiovascular disease) I25.10    Other proteinuria R80.8    Chest pain R07.9    Surgical wound dehiscence T81.31XA    CARMEN (acute kidney injury) ([facility] [address] N17.9    Anemia D64.9       Dr Flakito Ceron MD  7/27/2022  2:29 PM      SUBJECTIVE:  Chart reviewed, events noted  Patient feeling well. No complaints. Having BM. Tolerating p.o. diet. Less N/V. No abdominal pain.     ROS:  Cardiovascular ROS: No chest pain  Gastrointestinal ROS: see above  Respiratory ROS: No SOB    OBJECTIVE:      /75   Pulse 75   Temp 97.9 °F (36.6 °C) (Oral)   Resp 17   Ht 5' 9\" (1.753 m)   Wt (!) 315 lb 14.7 oz (143.3 kg)   SpO2 94%   BMI 46.65 kg/m²     NAD  RRR, Nl s1s2  Lungs CTA Bilaterally, normal effort  Abdomen soft, ND, NT, no HSM, Bowel sounds normal  AAOx3, No asterixis     CBC:   Recent Labs     07/24/22 2018 07/26/22  0230 07/27/22  0530   WBC 8.5 7.5 7.6   HGB 9.1* 8.9* 9.0*    210 221     BMP:    Recent Labs     07/25/22 2048 07/26/22  0230 07/27/22  0530    139 139   K 5.1 4.5 4.3    106 102   CO2 19* 20* 25   BUN 49* 47* 38*   CREATININE 4.3* 4.8* 4.7*   GLUCOSE 160* 144* 195*     Magnesium:   Lab Results   Component Value Date/Time    MG 2.4 06/11/2021 06:09 AM     Hepatic:   Recent Labs     07/24/22 2018 07/26/22 0230 07/27/22  0530   AST 9* 8* 9*   ALT 8* 7* 8*   BILITOT 0.2 0.2 0.1 ALKPHOS 86 80 82     INR:   Recent Labs     07/25/22  0914   INR 0.92         Intake/Output Summary (Last 24 hours) at 7/27/2022 1429  Last data filed at 7/26/2022 2303  Gross per 24 hour   Intake --   Output 250 ml   Net -250 ml         Medications      Prior to Admission medications    Medication Sig Start Date End Date Taking?  Authorizing Provider   clopidogrel (PLAVIX) 75 MG tablet Take 1 tablet by mouth daily 10/2/21  Yes APPLE Hdz CNP   insulin glargine (LANTUS) 100 UNIT/ML injection vial Inject 75 Units into the skin nightly   Yes Historical Provider, MD   insulin aspart (NOVOLOG) 100 UNIT/ML injection vial Inject 35 Units into the skin 3 times daily (before meals)   Yes Historical Provider, MD   atorvastatin (LIPITOR) 40 MG tablet Take 1 tablet by mouth nightly 6/15/21  Yes Elfego Virk MD   metoprolol tartrate (LOPRESSOR) 25 MG tablet Take 1 tablet by mouth 2 times daily 6/15/21  Yes Elfego Virk MD   amLODIPine (NORVASC) 10 MG tablet Take 1 tablet by mouth daily 6/15/21  Yes Elfego Virk MD   chlorthalidone (HYGROTON) 25 MG tablet Take 1 tablet by mouth daily 6/15/21  Yes Elfego Virk MD   sertraline (ZOLOFT) 50 MG tablet Take 2 tablets by mouth daily 5/29/21  Yes Elfego Virk MD   aspirin 81 MG EC tablet Take 1 tablet by mouth daily 4/5/21  Yes Sugar Smith MD   linagliptin (TRADJENTA) 5 MG tablet Take 1 tablet by mouth daily 3/29/21  Yes Elfego Virk MD   ondansetron (ZOFRAN) 4 MG tablet Take 1 tablet by mouth every 8 hours as needed for Nausea or Vomiting 9/23/18  Yes Florina Nair MD   Lancets MISC 1 each by Does not apply route daily 9/21/18  Yes Florina Nair MD   apixaban (ELIQUIS) 2.5 MG TABS tablet Take 1 tablet by mouth 2 times daily 10/1/21   APPLE Hdz CNP   furosemide (LASIX) 40 MG tablet Take 1 tablet by mouth daily 9/22/21 10/22/21  Abran Avila MD   glucose monitoring kit (FREESTYLE) monitoring kit 1 each by Does not apply route once for 1 dose. 6/20/13 6/20/13  Ashish Gaxiola MD      Scheduled Medications:    insulin lispro  0-8 Units SubCUTAneous TID WC    insulin lispro  0-8 Units SubCUTAneous 2 times per day    sucralfate  1 g Oral BID AC    atorvastatin  40 mg Oral Nightly    sertraline  100 mg Oral Daily    sodium chloride flush  5-40 mL IntraVENous 2 times per day    calcitRIOL  0.25 mcg Oral Daily    insulin glargine  30 Units SubCUTAneous Nightly    insulin lispro  15 Units SubCUTAneous TID     epoetin paola-epbx  10,000 Units IntraVENous Once per day on Mon Wed Fri    pantoprazole  40 mg IntraVENous BID     Infusions:    dextrose      sodium chloride 20 mL/hr at 07/26/22 0802     PRN Medications: oxyCODONE, glucose, dextrose bolus **OR** dextrose bolus, glucagon (rDNA), dextrose, sodium chloride flush, sodium chloride, ondansetron **OR** ondansetron, polyethylene glycol, acetaminophen **OR** acetaminophen  Allergies:    Allergies   Allergen Reactions    Adhesive Tape Rash    Doxycycline Nausea And Vomiting    Reglan [Metoclopramide] Anxiety

## 2022-07-27 NOTE — PROGRESS NOTES
Progress Note( Dr. Terry Farr)  7/26/2022  Subjective:   Admit Date: 7/24/2022  PCP: Elfego Virk MD    Admitted For :Generalized weakness and syncope       Consulted For:  Better control of blood glucose    Interval History: Had EGD done this morning shows  ) Sandra-Roberson tear , no active bleed at this point                2) normal stomach, normal esophagus                3) normal duodenum  Also patient had hemodialysis yesterday and again today    Denies any chest pains,   Denies SOB . Denies nausea or vomiting. No new bowel or bladder symptoms. Intake/Output Summary (Last 24 hours) at 7/26/2022 2227  Last data filed at 7/26/2022 2002  Gross per 24 hour   Intake 550 ml   Output 2527 ml   Net -1977 ml       DATA    CBC:   Recent Labs     07/24/22 2018 07/26/22  0230   WBC 8.5 7.5   HGB 9.1* 8.9*    210    CMP:  Recent Labs     07/24/22 2018 07/25/22  0650 07/25/22  1755 07/25/22  2048 07/26/22  0230   *   < > 135 136 139   K 5.2*   < > 4.3 5.1 4.5      < > 103 104 106   CO2 19*   < > 23 19* 20*   BUN 70*   < > 39* 49* 47*   CREATININE 6.1*   < > 3.2* 4.3* 4.8*   CALCIUM 6.7*   < > 9.1 8.1* 7.8*   PROT 4.9*  --   --   --  4.8*   LABALBU 2.2*  --   --   --  2.2*   BILITOT 0.2  --   --   --  0.2   ALKPHOS 86  --   --   --  80   AST 9*  --   --   --  8*   ALT 8*  --   --   --  7*    < > = values in this interval not displayed.      Lipids:   Lab Results   Component Value Date/Time    CHOL 145 05/13/2021 10:28 AM    CHOL 168 10/15/2013 10:30 AM    HDL 29 05/13/2021 10:28 AM    TRIG 188 05/13/2021 10:28 AM     Glucose:  Recent Labs     07/26/22  1351 07/26/22  1549 07/26/22  1956   POCGLU 219* 282* 241*     OxrwopuzfvC0L:  Lab Results   Component Value Date/Time    LABA1C 10.0 07/25/2022 06:50 AM     High Sensitivity TSH:   Lab Results   Component Value Date/Time    TSHHS 3.860 07/25/2022 06:50 AM     Free T3: No results found for: FT3  Free T4:  Lab Results   Component Value Date/Time T4FREE 1.25 07/24/2017 11:10 AM       XR CHEST PORTABLE   Final Result   Temper dialysis access catheter via right lower cervical approach with tip in   the mid right atrium. No complication suggested. Underlying findings consistent with congestive heart failure/hypervolemia s/p   or low lung volumes without detectable pleural effusion. US RETROPERITONEAL LIMITED   Final Result   Unremarkable ultrasound of the kidneys. CT ABDOMEN PELVIS WO CONTRAST Additional Contrast? None   Final Result   No acute process in the abdomen or pelvis. CT CERVICAL SPINE WO CONTRAST   Final Result   No acute abnormality of the cervical spine. CT HEAD WO CONTRAST   Final Result   No acute intracranial abnormality. XR CHEST PORTABLE   Final Result   No acute cardiopulmonary abnormality.          IR NONTUNNELED VASCULAR CATHETER > 5 YEARS    (Results Pending)   IR TUNNELED CVC PLACE WO SQ PORT/PUMP > 5 YEARS    (Results Pending)        Scheduled Medicines   Medications:    insulin lispro  0-4 Units SubCUTAneous TID WC    insulin lispro  0-4 Units SubCUTAneous Nightly    sucralfate  1 g Oral BID AC    atorvastatin  40 mg Oral Nightly    sertraline  100 mg Oral Daily    sodium chloride flush  5-40 mL IntraVENous 2 times per day    calcitRIOL  0.25 mcg Oral Daily    insulin glargine  30 Units SubCUTAneous Nightly    insulin lispro  15 Units SubCUTAneous TID WC    epoetin paola-epbx  10,000 Units IntraVENous Once per day on Mon Wed Fri    pantoprazole  40 mg IntraVENous BID      Infusions:    dextrose      sodium chloride 20 mL/hr at 07/26/22 0802         Objective:   Vitals: /86   Pulse 84   Temp 98.3 °F (36.8 °C) (Oral)   Resp 20   Ht 5' 9\" (1.753 m)   Wt (!) 315 lb 14.7 oz (143.3 kg)   SpO2 96%   BMI 46.65 kg/m²   General appearance: alert and cooperative with exam  Neck: no JVD or bruit  Thyroid : Normal lobes   Lungs: Has Vesicular Breath sounds   Heart:  regular rate and proteinuria     Chest pain     Surgical wound dehiscence     CARMEN (acute kidney injury) ([facility] [address]     Anemia      Plan:     Reviewed POC blood glucose . Labs and X ray results   Reviewed Current Medicines   On meal/ Correction bolus Humalog/ Basal Lantus Insulin regime  Monitor Blood glucose frequently   Modified  the dose of Insulin/ other medicines as needed   Discussed with nephrology patient going to have long-term hemodialysis  Will follow     .      Sharron Chapa MD, MD

## 2022-07-27 NOTE — CONSULTS
CARDIOLOGY CONSULT NOTE   Reason for consultation: Syncope    Referring physician:  Hannah Toribio MD     Primary care physician: Iwona Newamn MD      Dear   Thanks for the consult. History of present illness:Yovanny is a 52 y. o.year old who  presents with syncope, patient was walking and then he passed out, this time he also had involuntary bowel movement, patient has a history of orthostatic hypotension and syncope in past, patient has end-stage lung disease on dialysis started 2 days ago, has diabetes history of diabetes hyperlipidemia vomiting recently had hyperkalemia which resolved after dialysis  Chief Complaint   Patient presents with    Loss of Consciousness     Did not hit head, no blood thinners     Blood pressure, cholesterol, blood glucose and weight are well controlled. 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 Past surgical history:   has a past surgical history that includes Cardiac catheterization (2003, 2013); Tonsillectomy and adenoidectomy (1988); Upper gastrointestinal endoscopy (06/2/2011); Colonoscopy (6/2/2011); Nose surgery (1993); skin biopsy (N/A, [id number]); other surgical history (Right, 05/22/2015); Toe amputation;  Abdomen surgery; other surgical history (12/04/2017); pr deep incis foot bone infectn (Left, 9/22/2018); Upper gastrointestinal endoscopy (N/A, 1/12/2019); Toe amputation (Right, 3/23/2019); Toe amputation (Right, 8/6/2019); Scrotal surgery (N/A, 5/15/2021); Scrotal surgery (N/A, 5/16/2021); Foot surgery (Left, 12/21/2021); Upper gastrointestinal endoscopy (N/A, 7/26/2022); and IR TUNNELED CVC PLACE WO SQ PORT/PUMP > 5 YEARS (7/27/2022). Social History:   reports that he has been smoking cigarettes. He has never used smokeless tobacco. He reports that he does not currently use alcohol. He reports current drug use. Frequency: 7.00 times per week. Drug: Marijuana Darryle Pimple).   Family history:   no family history of CAD, STROKE of DM    Allergies   Allergen Reactions    Adhesive Tape Rash    Doxycycline Nausea And Vomiting    Reglan [Metoclopramide] Anxiety       insulin lispro (HUMALOG) injection vial 0-8 Units, TID WC  insulin lispro (HUMALOG) injection vial 0-8 Units, 2 times per day  sucralfate (CARAFATE) tablet 1 g, BID AC  oxyCODONE (ROXICODONE) immediate release tablet 5 mg, Q8H PRN  atorvastatin (LIPITOR) tablet 40 mg, Nightly  sertraline (ZOLOFT) tablet 100 mg, Daily  glucose chewable tablet 16 g, PRN  dextrose bolus 10% 125 mL, PRN   Or  dextrose bolus 10% 250 mL, PRN  glucagon (rDNA) injection 1 mg, PRN  dextrose 10 % infusion, Continuous PRN  sodium chloride flush 0.9 % injection 5-40 mL, 2 times per day  sodium chloride flush 0.9 % injection 5-40 mL, PRN  0.9 % sodium chloride infusion, PRN  ondansetron (ZOFRAN-ODT) disintegrating tablet 4 mg, Q8H PRN   Or  ondansetron (ZOFRAN) injection 4 mg, Q6H PRN  polyethylene glycol (GLYCOLAX) packet 17 g, Daily PRN  acetaminophen (TYLENOL) tablet 650 mg, Q6H PRN   Or  acetaminophen (TYLENOL) suppository 650 mg, Q6H PRN  calcitRIOL (ROCALTROL) capsule 0.25 mcg, Daily  insulin glargine (LANTUS) injection vial 30 Units, Nightly  insulin lispro (HUMALOG) injection vial 15 Units, TID   epoetin paola-epbx (RETACRIT) injection 10,000 Units, Once per day on Mon Wed Fri  pantoprazole (PROTONIX) injection 40 mg, BID      Current Facility-Administered Medications   Medication Dose Route Frequency Provider Last Rate Last Admin    insulin lispro (HUMALOG) injection vial 0-8 Units  0-8 Units SubCUTAneous TID  Monique Tovar MD   2 Units at 07/27/22 1250    insulin lispro (HUMALOG) injection vial 0-8 Units  0-8 Units SubCUTAneous 2 times per day Monique Tovar MD        sucralfate (CARAFATE) tablet 1 g  1 g Oral BID AC Meredith Bonds MD   1 g at 07/27/22 0650    oxyCODONE (ROXICODONE) immediate release tablet 5 mg  5 mg Oral Q8H PRN Carmen Momin MD   5 mg at 07/27/22 0941    atorvastatin (LIPITOR) tablet 40 mg  40 mg Oral Nightly Meredith Bonds MD   40 mg at 07/26/22 2058    sertraline (ZOLOFT) tablet 100 mg  100 mg Oral Daily Meredith Bonds MD   100 mg at 07/27/22 0941    glucose chewable tablet 16 g  4 tablet Oral PRN Meredith Bonds MD        dextrose bolus 10% 125 mL  125 mL IntraVENous PRN Meredith Bonds MD        Or    dextrose bolus 10% 250 mL  250 mL IntraVENous PRN Meredith Bonds MD        glucagon (rDNA) injection 1 mg  1 mg SubCUTAneous PRN Meredith Bonds MD        dextrose 10 % infusion   IntraVENous Continuous PRKIMBERLEE Bonds MD        sodium chloride flush 0.9 % injection 5-40 mL  5-40 mL IntraVENous 2 times per day Meredith Bonds MD   10 mL at 07/26/22 2106    sodium chloride flush 0.9 % injection 5-40 mL  5-40 mL IntraVENous PRN Meredith oBnds MD        0.9 % sodium chloride infusion   IntraVENous ZOYA Bonds MD 20 mL/hr at 07/26/22 0802 Restarted at 07/26/22 0816    ondansetron (ZOFRAN-ODT) disintegrating tablet 4 mg  4 mg Oral Q8H PRN Meredith Bonds MD   4 mg at 07/27/22 0942    Or    ondansetron (ZOFRAN) injection 4 mg  4 mg IntraVENous Q6H PRN Meredith Bonds MD   4 mg at 07/26/22 1459    polyethylene glycol (GLYCOLAX) packet 17 g  17 g Oral Daily PRN Meredith Bonds MD        acetaminophen (TYLENOL) tablet 650 mg  650 mg Oral Q6H PRN Manolo Chin MD   650 mg at 07/26/22 2321    Or    acetaminophen (TYLENOL) suppository 650 mg  650 mg Rectal Q6H PRN Manolo Chin MD        calcitRIOL (ROCALTROL) capsule 0.25 mcg  0.25 mcg Oral Daily Jose Maria Cornelius MD   0.25 mcg at 07/27/22 0942    insulin glargine (LANTUS) injection vial 30 Units  30 Units SubCUTAneous Nightly Jose Maria Cornelius MD   30 Units at 07/26/22 2059    insulin lispro (HUMALOG) injection vial 15 Units  15 Units SubCUTAneous TID WC Manolo Chin MD   15 Units at 07/27/22 1251    epoetin paola-epbx (RETACRIT) injection 10,000 Units  10,000 Units IntraVENous Once per day on Mon Wed Fri Manolo Chin MD   10,000 Units at 07/25/22 1531    pantoprazole (PROTONIX) injection 40 mg  40 mg IntraVENous BID Manolo Chin MD   40 mg at 07/27/22 0858     Review of Systems:   Constitutional: No Fever or Weight Loss   Eyes: No Decreased Vision  ENT: No Headaches, Hearing Loss or Vertigo  Cardiovascular: No chest pain, dyspnea on exertion, palpitations or loss of consciousness  Respiratory: No cough or wheezing    Gastrointestinal: No abdominal pain, appetite loss, blood in stools, constipation, diarrhea or heartburn  Genitourinary: No dysuria, trouble voiding, or hematuria  Musculoskeletal:  No gait disturbance, weakness or joint complaints  Integumentary: No rash or pruritis  Neurological: No TIA or stroke symptoms  Psychiatric: No anxiety or depression  Endocrine: No malaise, fatigue or temperature intolerance  Hematologic/Lymphatic: No bleeding problems, blood clots or swollen lymph nodes  Allergic/Immunologic: No nasal congestion or hives  All systems negative except as marked.             Physical Examination:    Vitals:    07/27/22 1415   BP: 125/75   Pulse: 75   Resp: 17   Temp: 97.9 °F (36.6 °C)   SpO2: 94%      Wt Readings from Last 3 Encounters:   07/26/22 (!) 315 lb 14.7 oz (143.3 kg)   12/22/21 297 lb 6.4 oz (134.9 kg)   10/27/21 290 lb (131.5 kg)     Body mass index is 46.65 kg/m². General Appearance:  No distress, conversant    Constitutional:  Well developed, Well nourished, No acute distress, Non-toxic appearance. HENT:  Normocephalic, Atraumatic, Bilateral external ears normal, Oropharynx moist, No oral exudates, Nose normal. Neck- Normal range of motion, No tenderness, Supple, No stridor,no apical-carotid delay, no carotid bruit  Eyes:  PERRL, EOMI, Conjunctiva normal, No discharge. Respiratory:  Normal breath sounds, No respiratory distress, No wheezing, No chest tenderness. ,no use of accessory muscles, diaphragm movement is normal  Cardiovascular: (PMI) apex non displaced,no lifts no thrills, no s3,no s4, Normal heart rate, Normal rhythm, No murmurs, No rubs, No gallops. Carotid arteries pulse and amplitude are normal no bruit, no abdominal bruit noted ( normal abdominal aorta ausculation), femoral arteries pulse and amplitude are normal no bruit, pedal pulses are normal  GI:  Bowel sounds normal, Soft, No tenderness, No masses, No pulsatile masses, no hepatosplenomegally, no bruits  : External genitalia appear normal, No masses or lesions. No discharge. No CVA tenderness. Musculoskeletal:  Intact distal pulses, No edema, No tenderness, No cyanosis, No clubbing. Good range of motion in all major joints. No tenderness to palpation or major deformities noted. Back- No tenderness. Integument:  Warm, Dry, No erythema, No rash. Skin: no rash, no ulcers  Lymphatic:  No lymphadenopathy noted. Neurologic:  Alert & oriented x 3, Normal motor function, Normal sensory function, No focal deficits noted.    Psychiatric:  Affect normal, Judgment normal, Mood normal.   Lab Review   Recent Labs     07/27/22  0530   WBC 7.6   HGB 9.0*   HCT 27.3*         Recent Labs     07/27/22  0530      K 4.3      CO2 25   BUN 38*   CREATININE 4.7*     Recent Labs     07/27/22  0530   AST 9*   ALT 8*   BILITOT 0.1   ALKPHOS 82     No results for input(s): TROPONINI in the last 72 hours. Lab Results   Component Value Date    BNP 10 05/01/2013     Lab Results   Component Value Date    INR 0.92 07/25/2022    PROTIME 11.8 07/25/2022         EKG: Normal sinus rhythm    Chest Xray:    ECHO: LVEF  Labs, echo, meds reviewed  Assessment: 52 y. 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 Recommendations:    Recurrent syncope, patient is known to have orthostatic hypotension and syncope related to weight, this time he was walking and passed out, he recently started dialysis, will continue to watch his blood pressure and order a tilt table test for tomorrow, his echo was normal and hyperkalemia is resolved after dialysis, will continue telemetry monitoring  CAD :patient history of PCI of the LAD we will repeat a stress test tomorrow aspirin statin, patient was on metoprolol 25 mg p.o. twice daily and Norvasc with thiazide, will adjust medications  END Stage renal disease on dialysis  Diabetes stable  All labs, medications and tests reviewed, continue all other medications of all above medical condition listed as is.          Mirza Lombardo MD, 7/27/2022 4:39 PM

## 2022-07-27 NOTE — CARE COORDINATION
Total Core Antibody resulted. Referral faxed to [facility] requesting M/W/F rotation at [facility] on [address] [facility]. 1141 - [facility] admissions contacted this RN CM and stated that [facility] on [address] [facility] is at [email or web address] [facility] capacity and cannot accept pt at this location. They do have openings at other [facility] location in [facility] for T/TH/S on first shift. Perfect Serve sent to Dr. Tiffany Mills advising on above. 56 - Dr. Tiffany Mills informed this RN CM that [facility] will accept pt and Te Naylor from [facility] will be calling this RN CM .     2473 - This RN CM spoke with Te Naylor from [facility] and they are at capacity but it was due to they had already added this pt to their schedule. Te Naylor did not receive the information that was faxed to [address] [place] [address] and has asked that this RN CM fax admissions packet to [phone]. Packet faxed at this time. Marlene Stafford from [facility] called and pt is set for OP HD on Friday at 5:30 am at [facility] location.

## 2022-07-27 NOTE — PROGRESS NOTES
Nephrology Progress Note        [address], [address] [address]  Phone: [phone]  Office Hours: 8:30AM - 4:30PM  Monday - Friday 7/27/2022 8:41 AM  Subjective:   Admit Date: 7/24/2022  PCP: Diane Ruiz MD  Interval History: FEELING TIRED    Diet: ADULT DIET; Easy to Chew; 4 carb choices (60 gm/meal); Low Potassium (Less than 3000 mg/day)      Data:   Scheduled Meds:   insulin lispro  0-8 Units SubCUTAneous TID WC    insulin lispro  0-8 Units SubCUTAneous 2 times per day    sucralfate  1 g Oral BID AC    atorvastatin  40 mg Oral Nightly    sertraline  100 mg Oral Daily    sodium chloride flush  5-40 mL IntraVENous 2 times per day    calcitRIOL  0.25 mcg Oral Daily    insulin glargine  30 Units SubCUTAneous Nightly    insulin lispro  15 Units SubCUTAneous TID WC    epoetin paola-epbx  10,000 Units IntraVENous Once per day on Mon Wed Fri    pantoprazole  40 mg IntraVENous BID     Continuous Infusions:   dextrose      sodium chloride 20 mL/hr at 07/26/22 0802     PRN Meds:oxyCODONE, glucose, dextrose bolus **OR** dextrose bolus, glucagon (rDNA), dextrose, sodium chloride flush, sodium chloride, ondansetron **OR** ondansetron, polyethylene glycol, acetaminophen **OR** acetaminophen  I/O last 3 completed shifts: In: 550 [I.V.:50]  Out: 2702 [Urine:700]  No intake/output data recorded.     Intake/Output Summary (Last 24 hours) at 7/27/2022 0841  Last data filed at 7/26/2022 2303  Gross per 24 hour   Intake 500 ml   Output 2252 ml   Net -1752 ml       CBC:   Recent Labs     07/24/22 2018 07/26/22  0230 07/27/22  0530   WBC 8.5 7.5 7.6   HGB 9.1* 8.9* 9.0*    210 221       BMP:    Recent Labs     07/25/22 2048 07/26/22  0230 07/27/22  0530    139 139   K 5.1 4.5 4.3    106 102   CO2 19* 20* 25   BUN 49* 47* 38*   CREATININE 4.3* 4.8* 4.7*   GLUCOSE 160* 144* 195*     Hepatic:   Recent Labs     07/24/22 2018 07/26/22  0230 07/27/22  0530   AST 9* 8* 9*   ALT 8* 7* 8*   BILITOT 0.2 0.2 0.1   ALKPHOS 86 80 82     Troponin: No results for input(s): TROPONINI in the last 72 hours. BNP: No results for input(s): BNP in the last 72 hours. Lipids: No results for input(s): CHOL, HDL in the last 72 hours.     Invalid input(s): LDLCALCU  ABGs:   Lab Results   Component Value Date/Time    PO2ART 119 06/18/2013 01:15 PM    ZII1WZU 47.0 06/18/2013 01:15 PM     INR:   Recent Labs     07/25/22  0914   INR 0.92       Objective:   Vitals: BP (!) 149/75   Pulse 75   Temp 98.6 °F (37 °C) (Oral)   Resp 16   Ht 5' 9\" (1.753 m)   Wt (!) 315 lb 14.7 oz (143.3 kg)   SpO2 95%   BMI 46.65 kg/m²   General appearance: alert and cooperative with exam, in no acute distress  HEENT: normocephalic, atraumatic,   Neck: supple, trachea midline  Lungs: clear to auscultation bilaterally, breathing comfortably on room air  Heart[de-identified] regular rate and rhythm, S1, S2 normal,  Abdomen: soft, non-tender; non distended, +bowel sounds  Extremities: extremities atraumatic, no cyanosis or edema  Neurologic: alert, oriented, follows commands, interactive    Assessment and Plan:     Patient Active Problem List    Diagnosis Date Noted    CARMEN (acute kidney injury) ([facility] 07/25/2022    Anemia 07/25/2022    Recurrent major depressive disorder, in full remission ([facility] 05/18/2021    Generalized anxiety disorder 05/18/2021    Surgical wound dehiscence 02/08/2022    Chest pain 12/21/2021    Other proteinuria     FH: CAD (coronary artery disease) 09/29/2021    ASCVD (arteriosclerotic cardiovascular disease) 09/29/2021    Chronic kidney disease, stage IV (severe) ([facility] 09/28/2021    Nephrotic syndrome 09/22/2021    Generalized edema 09/22/2021    Stage 3b chronic kidney disease ([facility] 09/22/2021    Diabetic nephropathy associated with type 2 diabetes mellitus ([facility] 09/22/2021    Hypoalbuminemia 09/22/2021    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 type 2 diabetes mellitus, with muscle involvement without evidence of necrosis ([address] 09/21/2021    Right groin wound 06/09/2021    Ulcer of right groin with fat layer exposed ([address]     Syncope 06/05/2021    Necrotizing fasciitis ([address] 05/24/2021    Morbid obesity with body mass index (BMI) of 40.0 to 44.9 in adult [facility]) 05/21/2021    Irene gangrene     Cellulitis, scrotum 05/13/2021    Acute epididymitis     Cellulitis of left arm 04/03/2021    Cellulitis 03/23/2021    Acute renal failure (ARF) ([address] 08/04/2019    Diabetic foot infection ([address] 03/21/2019    Intractable nausea and vomiting 01/11/2019    Uncontrolled type 2 diabetes mellitus ([address] 01/11/2019    Nausea and vomiting 01/11/2019    Constipation 10/07/2018    Cellulitis of left foot     Sepsis ([address] 50/47/1160    Periumbilical hernia     Abscess 12/03/2017    Osteomyelitis ([address] 05/22/2015    Bronchitis 10/15/2013    Hyperlipidemia with target LDL less than 100 07/15/2013    Essential hypertension 07/15/2013    Bilateral carpal tunnel syndrome- left worse than right 06/24/2013    DANIELA (obstructive sleep apnea) 06/20/2013    Urinary frequency 06/20/2013    Neck pain 05/16/2013    Anxiety associated with depression     Panic attack 02/01/2012    Diabetic neuropathy ([address] 12/22/2011    Hiatal hernia 02/04/2011    Diabetes mellitus type 2, improved controlled 02/04/2011     HD ON Friday  TUNNELLED HD CATH  PLACEMENT PER IR  OUTPT HD WILL BE MWF AT [address]                  Electronically signed by Jaylyn Landa DO on 7/27/2022 at 8:41 MD Terri Arana DO [address]  [place], [place] [address]  PHONE: [phone]  FAX: [phone]

## 2022-07-27 NOTE — PROGRESS NOTES
V2.0  [facility] Hospitalist Progress Note      Name:  Qiana Childers /Age/Sex: 1975  (52 y.o. male)   MRN & CSN:  [id number] & [id number] Encounter Date/Time: 2022 10:46 AM EDT    Location:  [address] [place] [address] PCP: Lauri Parker MD       Hospital Day: 4    Assessment and Plan:   Qiana Childers is a 52 y.o. 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, hypertension, hyperlipidemia, type 2 diabetes and CKD stage IV with baseline around 2.2 who presents with Syncope    Assessment and Plan    *Syncope, seems recurrent     Likely secondary to orthostatic hypotension and metabolic derangements  CT head within normal limits  EKG within normal limits  Neurology following, appreciate recommendations  Fall precautions  Consult cardiology to r/o cardiac cause     *Acute on chronic anemia secondary to GI bleed  EGD 2022: Sandra-Roberson tear, no active bleed  Chronic normocytic anemia due to underlying chronic kidney disease  Started on EPO  Will resume Plavix once cleared by gastroenterology. Contacted primary care physician Dr. Lam Posada reports that the patient was last seen in office in 2021 and was not on Eliquis. discontinue Eliquis   Once GI clears we can resume aspirin and Plavix to complete 12 months of DAPT post PCI.   Continue IV Protonix twice daily  GI following, appreciate recommendations  Monitor CBC and transfuse if hemoglobin less than 7  Hb stable at 9    *End-stage renal disease on hemodialysis  -started HD 2022  Nephrology following, appreciate recommendations  Monitor BMP  Tunneled HD catheter before discharge, consulted interventional radiology  Case management working on outpatient HD set up pending hepatitis panel    *Type 2 diabetes with hyperglycemia  Basal insulin in addition to low-dose insulin sliding scale  Target blood glucose level 140-180  HbA1c 10%  Endocrinology following    *Intractable nausea/vomiting  -Improving  As needed antinausea medications    *Hyperkalemia  -Resolved after hemodialysis    Chronic conditions include:  *Coronary artery disease status post PCI in LAD (9/2021)  -Patient is on Plavix and statin outpatient  -Resume aspirin and Plavix after cleared by GI  *Hypertension:Hold antihypertensives in the setting of newly initiated hemodialysis  *Hyperlipidemia: Continue statin  *History of Irene's gangrene  *History of toe amputations    Diet ADULT DIET; Easy to Chew; 4 carb choices (60 gm/meal); Low Potassium (Less than 3000 mg/day)   DVT Prophylaxis [] Lovenox, []  Heparin, [x] SCDs, [] Ambulation,  [] Eliquis, [] Xarelto  [] Coumadin   Code Status Full Code   Disposition From: Home  Expected Disposition: Home  Estimated Date of Discharge:   Patient requires continued admission due to tunneled HD catheter placement, outpatient HD set up and IV Protonix/GI clearance. Surrogate Decision Maker/ POA Wife     Subjective:     Chief Complaint: Loss of Consciousness (Did not hit head, no blood thinners)       Leora Opitz is a 52 y.o. male who presents with a syncopal episode. Patient was seen and evaluated at during hemodialysis. Patient reports mild nausea but states otherwise that his nausea/vomiting episodes have improved significantly since admission. Patient seems anxious about outcomes after this hospitalization. Detailed discussion regarding treatment plan which includes placement of a tunneled HD catheter and outpatient set up of hemodialysis. Patient apparently was supposed to be on Eliquis and Plavix but reports that he only takes Plavix. Upon chart review unsure about the reasoning of Eliquis. The patient was seen and examined at bedside  Denies CP or SOB  Tunneled HD catheter placement by IR today , HD on Friday   Patient feels weak and interested in acute rehab placement  Consult pt/ot          Objective:      Intake/Output Summary (Last 24 hours) at 7/27/2022 1141  Last data filed at 7/26/2022 2303  Gross per 24 hour   Intake 500 ml   Output 2252 ml   Net -1752 ml          Vitals:   Vitals:    07/27/22 1122   BP: (!) 167/102   Pulse: 72   Resp: 16   Temp:    SpO2: 97%       Physical Exam:   Physical Exam  Constitutional:       General: He is not in acute distress. Appearance: He is not ill-appearing, toxic-appearing or diaphoretic. HENT:      Head: Normocephalic and atraumatic. Nose: Nose normal.      Mouth/Throat:      Mouth: Mucous membranes are dry. Pharynx: Oropharynx is clear. Eyes:      Conjunctiva/sclera: Conjunctivae normal.      Pupils: Pupils are equal, round, and reactive to light. Neck:      Comments: HD catheter in place undergoing hemodialysis  Cardiovascular:      Rate and Rhythm: Normal rate and regular rhythm. Pulses: Normal pulses. Heart sounds: Normal heart sounds. No murmur heard. Pulmonary:      Effort: Pulmonary effort is normal.      Breath sounds: Normal breath sounds. Abdominal:      Tenderness: There is abdominal tenderness (Improved from admission). There is no guarding or rebound. Musculoskeletal:      Cervical back: Normal range of motion and neck supple. Right lower leg: No edema. Left lower leg: No edema. Comments: Multiple toe amputations   Skin:     General: Skin is warm and dry. Neurological:      General: No focal deficit present. Mental Status: He is oriented to person, place, and time.    Psychiatric:      Comments: Anxious          Medications:   Medications:    insulin lispro  0-8 Units SubCUTAneous TID WC    insulin lispro  0-8 Units SubCUTAneous 2 times per day    sucralfate  1 g Oral BID AC    atorvastatin  40 mg Oral Nightly    sertraline  100 mg Oral Daily    sodium chloride flush  5-40 mL IntraVENous 2 times per day    calcitRIOL  0.25 mcg Oral Daily    insulin glargine  30 Units SubCUTAneous Nightly    insulin lispro  15 Units SubCUTAneous TID     epoetin paola-epbx  10,000 Units IntraVENous Once per day on Mon Wed Fri    pantoprazole  40 mg IntraVENous BID      Infusions:    dextrose      sodium chloride 20 mL/hr at 07/26/22 0802     PRN Meds: oxyCODONE, 5 mg, Q8H PRN  glucose, 4 tablet, PRN  dextrose bolus, 125 mL, PRN   Or  dextrose bolus, 250 mL, PRN  glucagon (rDNA), 1 mg, PRN  dextrose, , Continuous PRN  sodium chloride flush, 5-40 mL, PRN  sodium chloride, , PRN  ondansetron, 4 mg, Q8H PRN   Or  ondansetron, 4 mg, Q6H PRN  polyethylene glycol, 17 g, Daily PRN  acetaminophen, 650 mg, Q6H PRN   Or  acetaminophen, 650 mg, Q6H PRN      Labs      Recent Results (from the past 24 hour(s))   POCT Glucose    Collection Time: 07/26/22  1:51 PM   Result Value Ref Range    POC Glucose 219 (H) 70 - 99 MG/DL   POCT Glucose    Collection Time: 07/26/22  3:49 PM   Result Value Ref Range    POC Glucose 282 (H) 70 - 99 MG/DL   POCT Glucose    Collection Time: 07/26/22  7:56 PM   Result Value Ref Range    POC Glucose 241 (H) 70 - 99 MG/DL   Comprehensive Metabolic Panel w/ Reflex to MG    Collection Time: 07/27/22  5:30 AM   Result Value Ref Range    Sodium 139 135 - 145 MMOL/L    Potassium 4.3 3.5 - 5.1 MMOL/L    Chloride 102 99 - 110 mMol/L    CO2 25 21 - 32 MMOL/L    BUN 38 (H) 6 - 23 MG/DL    Creatinine 4.7 (H) 0.9 - 1.3 MG/DL    Glucose 195 (H) 70 - 99 MG/DL    Calcium 7.8 (L) 8.3 - 10.6 MG/DL    Albumin 2.5 (L) 3.4 - 5.0 GM/DL    Total Protein 4.7 (L) 6.4 - 8.2 GM/DL    Total Bilirubin 0.1 0.0 - 1.0 MG/DL    ALT 8 (L) 10 - 40 U/L    AST 9 (L) 15 - 37 IU/L    Alkaline Phosphatase 82 40 - 129 IU/L    GFR Non- 13 (L) >60 mL/min/1.73m2    GFR  16 (L) >60 mL/min/1.73m2    Anion Gap 12 4 - 16   CBC with Auto Differential    Collection Time: 07/27/22  5:30 AM   Result Value Ref Range    WBC 7.6 4.0 - 10.5 K/CU MM    RBC 3.05 (L) 4.6 - 6.2 M/CU MM    Hemoglobin 9.0 (L) 13.5 - 18.0 GM/DL    Hematocrit 27.3 (L) 42 - 52 %    MCV 89.5 78 - 100 FL    MCH 29.5 27 - 31 PG    MCHC 33.0 32.0 - 36.0 %    RDW 12.8 11.7 - 14.9 %    Platelets 783 873 - 613 K/CU MM    MPV 10.4 7.5 - 11.1 FL    Differential Type AUTOMATED DIFFERENTIAL     Segs Relative 63.4 36 - 66 %    Lymphocytes % 19.8 (L) 24 - 44 %    Monocytes % 11.5 (H) 0 - 4 %    Eosinophils % 2.8 0 - 3 %    Basophils % 0.8 0 - 1 %    Segs Absolute 4.8 K/CU MM    Lymphocytes Absolute 1.5 K/CU MM    Monocytes Absolute 0.9 K/CU MM    Eosinophils Absolute 0.2 K/CU MM    Basophils Absolute 0.1 K/CU MM    Nucleated RBC % 0.0 %    Total Nucleated RBC 0.0 K/CU MM    Total Immature Neutrophil 0.13 K/CU MM    Immature Neutrophil % 1.7 (H) 0 - 0.43 %        Imaging/Diagnostics Last 24 Hours   CT ABDOMEN PELVIS WO CONTRAST Additional Contrast? None    Result Date: 7/24/2022  EXAMINATION: CT OF THE ABDOMEN AND PELVIS WITHOUT CONTRAST 7/24/2022 9:58 pm TECHNIQUE: CT of the abdomen and pelvis was performed without the administration of intravenous contrast. Multiplanar reformatted images are provided for review. Automated exposure control, iterative reconstruction, and/or weight based adjustment of the mA/kV was utilized to reduce the radiation dose to as low as reasonably achievable. COMPARISON: 08/11/2021 HISTORY: ORDERING SYSTEM PROVIDED HISTORY: pain TECHNOLOGIST PROVIDED HISTORY: Reason for exam:->pain Additional Contrast?->None Reason for Exam: pain FINDINGS: Lower Chest: Partially imaged coronary artery calcification. Organs: The liver is normal in contour and attenuation. Gallbladder is unremarkable. No biliary ductal dilatation. Pancreas, adrenals, kidneys, spleen are unremarkable. Mild calcific atherosclerosis. GI/Bowel: Left colonic diverticulosis. Bowel is nondilated without wall thickening. Appendix is normal. Pelvis: Unremarkable. Peritoneum/Retroperitoneum: No free air, free fluid, organized fluid collection, lymphadenopathy. Bones/Soft Tissues: No acute bone or soft tissue abnormality.      No acute process in the abdomen or pelvis. CT HEAD WO CONTRAST    Result Date: 7/24/2022  EXAMINATION: CT OF THE HEAD WITHOUT CONTRAST  7/24/2022 9:57 pm TECHNIQUE: CT of the head was performed without the administration of intravenous contrast. Automated exposure control, iterative reconstruction, and/or weight based adjustment of the mA/kV was utilized to reduce the radiation dose to as low as reasonably achievable. COMPARISON: 11/25/2019 HISTORY: ORDERING SYSTEM PROVIDED HISTORY: syncope TECHNOLOGIST PROVIDED HISTORY: Has a \"code stroke\" or \"stroke alert\" been called? ->No Reason for exam:->syncope Decision Support Exception - unselect if not a suspected or confirmed emergency medical condition->Emergency Medical Condition (MA) Reason for Exam: syncope FINDINGS: BRAIN/VENTRICLES: There is no acute intracranial hemorrhage, mass effect or midline shift. No abnormal extra-axial fluid collection. The gray-white differentiation is maintained without evidence of an acute infarct. There is no evidence of hydrocephalus. ORBITS: The visualized portion of the orbits demonstrate no acute abnormality. SINUSES: The visualized paranasal sinuses and mastoid air cells demonstrate no acute abnormality. SOFT TISSUES/SKULL:  No acute abnormality of the visualized skull or soft tissues. No acute intracranial abnormality. CT CERVICAL SPINE WO CONTRAST    Result Date: 7/24/2022  EXAMINATION: CT OF THE CERVICAL SPINE WITHOUT CONTRAST 7/24/2022 9:58 pm TECHNIQUE: CT of the cervical spine was performed without the administration of intravenous contrast. Multiplanar reformatted images are provided for review. Automated exposure control, iterative reconstruction, and/or weight based adjustment of the mA/kV was utilized to reduce the radiation dose to as low as reasonably achievable.  COMPARISON: 07/09/2018 HISTORY: ORDERING SYSTEM PROVIDED HISTORY: fall TECHNOLOGIST PROVIDED HISTORY: Reason for exam:->fall Decision Support Exception - unselect if not a suspected or confirmed emergency medical condition->Emergency Medical Condition (MA) Reason for Exam: fall FINDINGS: BONES/ALIGNMENT: There is no acute fracture or traumatic malalignment. DEGENERATIVE CHANGES: No significant degenerative changes. SOFT TISSUES: There is no prevertebral soft tissue swelling. No acute abnormality of the cervical spine. XR CHEST PORTABLE    Result Date: 7/24/2022  EXAMINATION: ONE XRAY VIEW OF THE CHEST 7/24/2022 8:31 pm COMPARISON: 09/30/2021 HISTORY: ORDERING SYSTEM PROVIDED HISTORY: Chest pain TECHNOLOGIST PROVIDED HISTORY: Reason for exam:->Chest pain Reason for Exam: loss of consciousness Additional signs and symptoms: chest pain FINDINGS: Cardiomediastinal silhouette is unchanged in size. There is no pleural effusion or pneumothorax. There is no pulmonary consolidation. There is no acute osseous abnormality. No acute cardiopulmonary abnormality. US RETROPERITONEAL LIMITED    Result Date: 7/25/2022  EXAMINATION: ULTRASOUND OF THE KIDNEYS 7/24/2022 11:18 pm COMPARISON: None. HISTORY: ORDERING SYSTEM PROVIDED HISTORY: García TECHNOLOGIST PROVIDED HISTORY: Reason for exam:->García Reason for Exam: garcía FINDINGS: The right kidney measures 12.8 cm in length and the left kidney measures 13.2 cm in length. Kidneys demonstrate normal cortical echogenicity. No hydronephrosis or intrarenal stones. No focal lesions. Unremarkable ultrasound of the kidneys.        Electronically signed by Mateo Luna MD on 7/27/2022 at 11:41 AM

## 2022-07-28 LAB
ALBUMIN SERPL-MCNC: 2.5 GM/DL (ref 3.4–5)
ALP BLD-CCNC: 79 IU/L (ref 40–128)
ALT SERPL-CCNC: 10 U/L (ref 10–40)
ANION GAP SERPL CALCULATED.3IONS-SCNC: 12 MMOL/L (ref 4–16)
AST SERPL-CCNC: 11 IU/L (ref 15–37)
BASOPHILS ABSOLUTE: 0 K/CU MM
BASOPHILS RELATIVE PERCENT: 0.3 % (ref 0–1)
BILIRUB SERPL-MCNC: 0.2 MG/DL (ref 0–1)
BUN BLDV-MCNC: 42 MG/DL (ref 6–23)
CALCIUM SERPL-MCNC: 7.3 MG/DL (ref 8.3–10.6)
CHLORIDE BLD-SCNC: 103 MMOL/L (ref 99–110)
CO2: 24 MMOL/L (ref 21–32)
CREAT SERPL-MCNC: 5.5 MG/DL (ref 0.9–1.3)
DIFFERENTIAL TYPE: ABNORMAL
EOSINOPHILS ABSOLUTE: 0.2 K/CU MM
EOSINOPHILS RELATIVE PERCENT: 2.4 % (ref 0–3)
GFR AFRICAN AMERICAN: 14 ML/MIN/1.73M2
GFR NON-AFRICAN AMERICAN: 11 ML/MIN/1.73M2
GLUCOSE BLD-MCNC: 140 MG/DL (ref 70–99)
GLUCOSE BLD-MCNC: 156 MG/DL (ref 70–99)
GLUCOSE BLD-MCNC: 189 MG/DL (ref 70–99)
GLUCOSE BLD-MCNC: 201 MG/DL (ref 70–99)
GLUCOSE BLD-MCNC: 215 MG/DL (ref 70–99)
GLUCOSE BLD-MCNC: 250 MG/DL (ref 70–99)
HCT VFR BLD CALC: 27.3 % (ref 42–52)
HEMOGLOBIN: 9.1 GM/DL (ref 13.5–18)
IMMATURE NEUTROPHIL %: 0.8 % (ref 0–0.43)
LYMPHOCYTES ABSOLUTE: 1.3 K/CU MM
LYMPHOCYTES RELATIVE PERCENT: 18.9 % (ref 24–44)
MCH RBC QN AUTO: 29.7 PG (ref 27–31)
MCHC RBC AUTO-ENTMCNC: 33.3 % (ref 32–36)
MCV RBC AUTO: 89.2 FL (ref 78–100)
MONOCYTES ABSOLUTE: 0.6 K/CU MM
MONOCYTES RELATIVE PERCENT: 9.4 % (ref 0–4)
NUCLEATED RBC %: 0 %
PDW BLD-RTO: 12.8 % (ref 11.7–14.9)
PLATELET # BLD: 203 K/CU MM (ref 140–440)
PMV BLD AUTO: 10.2 FL (ref 7.5–11.1)
POTASSIUM SERPL-SCNC: 4.1 MMOL/L (ref 3.5–5.1)
RBC # BLD: 3.06 M/CU MM (ref 4.6–6.2)
SEGMENTED NEUTROPHILS ABSOLUTE COUNT: 4.5 K/CU MM
SEGMENTED NEUTROPHILS RELATIVE PERCENT: 68.2 % (ref 36–66)
SODIUM BLD-SCNC: 139 MMOL/L (ref 135–145)
TOTAL IMMATURE NEUTOROPHIL: 0.05 K/CU MM
TOTAL NUCLEATED RBC: 0 K/CU MM
TOTAL PROTEIN: 4.8 GM/DL (ref 6.4–8.2)
WBC # BLD: 6.6 K/CU MM (ref 4–10.5)

## 2022-07-28 PROCEDURE — 85027 COMPLETE CBC AUTOMATED: CPT

## 2022-07-28 PROCEDURE — 2580000003 HC RX 258: Performed by: INTERNAL MEDICINE

## 2022-07-28 PROCEDURE — 94761 N-INVAS EAR/PLS OXIMETRY MLT: CPT

## 2022-07-28 PROCEDURE — 36592 COLLECT BLOOD FROM PICC: CPT

## 2022-07-28 PROCEDURE — 6360000002 HC RX W HCPCS: Performed by: INTERNAL MEDICINE

## 2022-07-28 PROCEDURE — C9113 INJ PANTOPRAZOLE SODIUM, VIA: HCPCS | Performed by: INTERNAL MEDICINE

## 2022-07-28 PROCEDURE — 99232 SBSQ HOSP IP/OBS MODERATE 35: CPT | Performed by: INTERNAL MEDICINE

## 2022-07-28 PROCEDURE — 6370000000 HC RX 637 (ALT 250 FOR IP): Performed by: INTERNAL MEDICINE

## 2022-07-28 PROCEDURE — 36415 COLL VENOUS BLD VENIPUNCTURE: CPT

## 2022-07-28 PROCEDURE — 97116 GAIT TRAINING THERAPY: CPT

## 2022-07-28 PROCEDURE — 6370000000 HC RX 637 (ALT 250 FOR IP): Performed by: STUDENT IN AN ORGANIZED HEALTH CARE EDUCATION/TRAINING PROGRAM

## 2022-07-28 PROCEDURE — 80053 COMPREHEN METABOLIC PANEL: CPT

## 2022-07-28 PROCEDURE — 1200000000 HC SEMI PRIVATE

## 2022-07-28 PROCEDURE — 97166 OT EVAL MOD COMPLEX 45 MIN: CPT

## 2022-07-28 PROCEDURE — 97530 THERAPEUTIC ACTIVITIES: CPT

## 2022-07-28 PROCEDURE — 82962 GLUCOSE BLOOD TEST: CPT

## 2022-07-28 PROCEDURE — 85025 COMPLETE CBC W/AUTO DIFF WBC: CPT

## 2022-07-28 PROCEDURE — 97162 PT EVAL MOD COMPLEX 30 MIN: CPT

## 2022-07-28 RX ORDER — INSULIN LISPRO 100 [IU]/ML
20 INJECTION, SOLUTION INTRAVENOUS; SUBCUTANEOUS
Status: DISCONTINUED | OUTPATIENT
Start: 2022-07-28 | End: 2022-08-03 | Stop reason: HOSPADM

## 2022-07-28 RX ORDER — ALOGLIPTIN 12.5 MG/1
6.25 TABLET, FILM COATED ORAL DAILY
Status: DISCONTINUED | OUTPATIENT
Start: 2022-07-28 | End: 2022-08-03 | Stop reason: HOSPADM

## 2022-07-28 RX ORDER — INSULIN GLARGINE 100 [IU]/ML
35 INJECTION, SOLUTION SUBCUTANEOUS NIGHTLY
Status: DISCONTINUED | OUTPATIENT
Start: 2022-07-28 | End: 2022-07-30

## 2022-07-28 RX ADMIN — INSULIN LISPRO 20 UNITS: 100 INJECTION, SOLUTION INTRAVENOUS; SUBCUTANEOUS at 13:58

## 2022-07-28 RX ADMIN — INSULIN LISPRO 20 UNITS: 100 INJECTION, SOLUTION INTRAVENOUS; SUBCUTANEOUS at 17:11

## 2022-07-28 RX ADMIN — OXYCODONE HYDROCHLORIDE 5 MG: 5 TABLET ORAL at 17:46

## 2022-07-28 RX ADMIN — SODIUM CHLORIDE, PRESERVATIVE FREE 10 ML: 5 INJECTION INTRAVENOUS at 08:44

## 2022-07-28 RX ADMIN — ACETAMINOPHEN 650 MG: 325 TABLET ORAL at 22:08

## 2022-07-28 RX ADMIN — SUCRALFATE 1 G: 1 TABLET ORAL at 15:45

## 2022-07-28 RX ADMIN — OXYCODONE HYDROCHLORIDE 5 MG: 5 TABLET ORAL at 08:38

## 2022-07-28 RX ADMIN — SUCRALFATE 1 G: 1 TABLET ORAL at 05:29

## 2022-07-28 RX ADMIN — PANTOPRAZOLE SODIUM 40 MG: 40 INJECTION, POWDER, FOR SOLUTION INTRAVENOUS at 08:40

## 2022-07-28 RX ADMIN — INSULIN LISPRO 2 UNITS: 100 INJECTION, SOLUTION INTRAVENOUS; SUBCUTANEOUS at 17:10

## 2022-07-28 RX ADMIN — CALCITRIOL CAPSULES 0.25 MCG 0.25 MCG: 0.25 CAPSULE ORAL at 08:40

## 2022-07-28 RX ADMIN — PANTOPRAZOLE SODIUM 40 MG: 40 INJECTION, POWDER, FOR SOLUTION INTRAVENOUS at 22:09

## 2022-07-28 RX ADMIN — ALOGLIPTIN 6.25 MG: 12.5 TABLET, FILM COATED ORAL at 08:39

## 2022-07-28 RX ADMIN — INSULIN LISPRO 2 UNITS: 100 INJECTION, SOLUTION INTRAVENOUS; SUBCUTANEOUS at 22:14

## 2022-07-28 RX ADMIN — INSULIN GLARGINE 35 UNITS: 100 INJECTION, SOLUTION SUBCUTANEOUS at 22:14

## 2022-07-28 RX ADMIN — ATORVASTATIN CALCIUM 40 MG: 40 TABLET, FILM COATED ORAL at 22:09

## 2022-07-28 RX ADMIN — INSULIN LISPRO 4 UNITS: 100 INJECTION, SOLUTION INTRAVENOUS; SUBCUTANEOUS at 02:16

## 2022-07-28 RX ADMIN — SERTRALINE HYDROCHLORIDE 100 MG: 50 TABLET ORAL at 08:39

## 2022-07-28 RX ADMIN — SODIUM CHLORIDE, PRESERVATIVE FREE 10 ML: 5 INJECTION INTRAVENOUS at 22:11

## 2022-07-28 ASSESSMENT — PAIN DESCRIPTION - LOCATION
LOCATION: NECK
LOCATION: LEG;NECK

## 2022-07-28 ASSESSMENT — PAIN SCALES - GENERAL
PAINLEVEL_OUTOF10: 1
PAINLEVEL_OUTOF10: 8
PAINLEVEL_OUTOF10: 7
PAINLEVEL_OUTOF10: 8
PAINLEVEL_OUTOF10: 8

## 2022-07-28 NOTE — PROGRESS NOTES
Today's plan: She is refusing tilt able test we will get portion stress test today as patient already ate breakfast and had caffeine      Admit Date:  7/24/2022    Subjective: Okay      Chief complaints on admission  Chief Complaint   Patient presents with    Loss of Consciousness     Did not hit head, no blood thinners         History of present illness:Yovanny is a 52 y. o.year old who  presents with had concerns including Loss of Consciousness (Did not hit head, no blood thinners). 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 Past surgical history:   has a past surgical history that includes Cardiac catheterization (2003, 2013); Tonsillectomy and adenoidectomy (1988); Upper gastrointestinal endoscopy (06/2/2011); Colonoscopy (6/2/2011); Nose surgery (1993); skin biopsy (N/A, [id number]); other surgical history (Right, 05/22/2015); Toe amputation; Abdomen surgery; other surgical history (12/04/2017); pr deep incis foot bone infectn (Left, 9/22/2018); Upper gastrointestinal endoscopy (N/A, 1/12/2019); Toe amputation (Right, 3/23/2019); Toe amputation (Right, 8/6/2019); Scrotal surgery (N/A, 5/15/2021); Scrotal surgery (N/A, 5/16/2021);  Foot surgery (Left, 12/21/2021); Upper gastrointestinal endoscopy (N/A, 7/26/2022); and IR TUNNELED CVC PLACE WO SQ PORT/PUMP > 5 YEARS (7/27/2022). Social History:   reports that he has been smoking cigarettes. He has never used smokeless tobacco. He reports that he does not currently use alcohol. He reports current drug use. Frequency: 7.00 times per week. Drug: Marijuana Jairon Blow). Family history:  family history includes ADHD in his daughter; Bleeding Prob in his mother; Cancer in his mother; Diabetes in his father and sister; Heart Disease in his father; High Blood Pressure in his father, sister, and another family member; Kidney Disease in his father; Mental Illness in his sister and another family member; Obesity in his father and sister; Other in his son; Stroke in his mother. Allergies   Allergen Reactions    Adhesive Tape Rash    Doxycycline Nausea And Vomiting    Reglan [Metoclopramide] Anxiety         Objective:   BP (!) 185/86   Pulse 81   Temp 98.1 °F (36.7 °C) (Oral)   Resp 19   Ht 5' 9\" (1.753 m)   Wt (!) 306 lb 7 oz (139 kg)   SpO2 98%   BMI 45.25 kg/m²     Intake/Output Summary (Last 24 hours) at 7/28/2022 3481  Last data filed at 7/28/2022 0158  Gross per 24 hour   Intake --   Output 800 ml   Net -800 ml       TELEMETRY:      Physical Exam:  Constitutional:  Well developed, Well nourished, No acute distress, Non-toxic appearance. HENT:  Normocephalic, Atraumatic, Bilateral external ears normal, Oropharynx moist, No oral exudates, Nose normal. Neck- Normal range of motion, No tenderness, Supple, No stridor. Eyes:  PERRL, EOMI, Conjunctiva normal, No discharge. Respiratory:  Normal breath sounds, No respiratory distress, No wheezing, No chest tenderness. ,no use of accessory muscles, diaphragm movement is normal  Cardiovascular: (PMI) apex non displaced,no lifts no thrills, no s3,no s4, Normal heart rate, Normal rhythm, No murmurs, No rubs, No gallops.  Carotid arteries pulse and amplitude are normal no bruit, no abdominal bruit noted ( normal abdominal aorta ausculation), femoral arteries pulse and amplitude are normal no bruit, pedal pulses are normal  GI:  Bowel sounds normal, Soft, No tenderness, No masses, No pulsatile masses. : External genitalia appear normal, No masses or lesions. No discharge. No CVA tenderness. Musculoskeletal:  Intact distal pulses, No edema, No tenderness, No cyanosis, No clubbing. Good range of motion in all major joints. No tenderness to palpation or major deformities noted. Back- No tenderness. Integument:  Warm, Dry, No erythema, No rash. Lymphatic:  No lymphadenopathy noted. Neurologic:  Alert & oriented x 3, Normal motor function, Normal sensory function, No focal deficits noted.    Psychiatric:  Affect normal, Judgment normal, Mood normal.     Medications:    alogliptin  6.25 mg Oral Daily    insulin lispro  20 Units SubCUTAneous TID WC    insulin glargine  35 Units SubCUTAneous Nightly    insulin lispro  0-8 Units SubCUTAneous TID WC    insulin lispro  0-8 Units SubCUTAneous 2 times per day    sucralfate  1 g Oral BID AC    atorvastatin  40 mg Oral Nightly    sertraline  100 mg Oral Daily    sodium chloride flush  5-40 mL IntraVENous 2 times per day    calcitRIOL  0.25 mcg Oral Daily    epoetin paola-epbx  10,000 Units IntraVENous Once per day on Mon Wed Fri    pantoprazole  40 mg IntraVENous BID      dextrose      sodium chloride 20 mL/hr at 07/26/22 0802     oxyCODONE, glucose, dextrose bolus **OR** dextrose bolus, glucagon (rDNA), dextrose, sodium chloride flush, sodium chloride, ondansetron **OR** ondansetron, polyethylene glycol, acetaminophen **OR** acetaminophen    Lab Data:  CBC:   Recent Labs     07/26/22  0230 07/27/22  0530 07/28/22  0645   WBC 7.5 7.6 6.6   HGB 8.9* 9.0* 9.1*   HCT 27.3* 27.3* 27.3*   MCV 89.5 89.5 89.2    221 203     BMP:   Recent Labs     07/26/22  0230 07/27/22  0530 07/28/22  0645    139 139   K 4.5 4.3 4.1    102 103   CO2 20* 25 24   BUN 47* 38* 42*   CREATININE 4.8* 4.7* 5.5*     LIVER PROFILE:   Recent Labs     07/26/22  0230 07/27/22  0530 07/28/22  0645   AST 8* 9* 11*   ALT 7* 8* 10   BILITOT 0.2 0.1 0.2   ALKPHOS 80 82 79     PT/INR: No results for input(s): PROTIME, INR in the last 72 hours. APTT: No results for input(s): APTT in the last 72 hours. BNP:  No results for input(s): BNP in the last 72 hours. TROPONIN: @TROPONINI:3@      Assessment:  52 y. o.year old who is admitted for          Plan:  Recurrent syncope, patient is known to have orthostatic hypotension and syncope related to weight, this time he was walking and passed out, he recently started dialysis, will continue to watch his blood pressure and order a tilt table test for tomorrow, his echo was normal and hyperkalemia is resolved after dialysis, will continue telemetry monitoring  CAD :patient history of PCI of the LAD we will repeat a stress test tomorrow aspirin statin, patient was on metoprolol 25 mg p.o. twice daily and Norvasc with thiazide, will adjust medications  END Stage renal disease on dialysis  Diabetes stableAll labs, medications and tests reviewed, continue all other medications of all above medical condition listed as is.       Duane Sheikh MD, MD 7/28/2022 9:18 AM

## 2022-07-28 NOTE — PROGRESS NOTES
Physical Therapy  Facility/Department: [address] [place] [address]  Physical Therapy Initial Assessment    Name: Conner Chun  : 1975  MRN: [id number]  Date of Service: 2022    Discharge Recommendations:  Subacute/Skilled Nursing Facility   PT Equipment Recommendations  Equipment Needed:  (pend to next LOC)      Patient Diagnosis(es): The primary encounter diagnosis was Syncope and collapse. Diagnoses of CARMEN (acute kidney injury) ([address] Hypocalcemia, Hyperglycemia, and Elevated troponin were also pertinent to this visit. 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 Past Surgical History:  has a past surgical history that includes Cardiac catheterization (, ); Tonsillectomy and adenoidectomy (); Upper gastrointestinal endoscopy (2011); Colonoscopy (2011); Nose surgery (); skin biopsy (N/A, [id number]); other surgical history (Right, 2015); Toe amputation; Abdomen surgery; other surgical history (2017); pr deep incis foot bone infectn (Left, 2018); Upper gastrointestinal endoscopy (N/A, 2019); Toe amputation (Right, 3/23/2019); Toe amputation (Right, 2019); Scrotal surgery (N/A, 5/15/2021); Scrotal surgery (N/A, 2021);  Foot surgery (Left, 12/21/2021); Upper gastrointestinal endoscopy (N/A, 7/26/2022); and IR TUNNELED CVC PLACE WO SQ PORT/PUMP > 5 YEARS (7/27/2022). Assessment   Body Structures, Functions, Activity Limitations Requiring Skilled Therapeutic Intervention: Decreased functional mobility ; Decreased strength; Increased pain;Decreased endurance;Decreased ADL status; Decreased balance;Decreased sensation;Decreased tolerance to work activity  Assessment: Pt presents 2 days s/p admission for weakness, fatigue, stomach pain, lightheadedness and loss of consciousness leading to fall. He is from home w/ his wife, 3 NOLAN their 2 level home, has been staying on main level, recieving some assist w/ completion of ADL's and home mgmt, uses a cane at baseline for mobility. He presents w/ the above listed deficits, however, he is primarily limited by pain and endurance. Lightheadedness for OH + s/s, dec endurance, impaired sensation + diabetic neuropathic pain, impaired balance 2/2 neuropathy, pain affecting function, depressed mood 2/2 decline in function and issues w/ chronic pain, operating below his baseline. [facility] work[facility]. Recommending SNF. Therapy Prognosis: Good  Requires PT Follow-Up: Yes  Activity Tolerance  Activity Tolerance: Patient limited by endurance     Treatment:  Therapeutic Activity Training:   Therapeutic activity training was instructed today. Cues were given for safety, sequence, UE/LE placement, awareness, and balance. Activities performed today included bed mobility training, sup-sit, sit-stand, SPT. Functional mobility, positional tolerance testing, DC planning, pain education    Gait Training:  Cues were given for safety, sequence, device management, balance, posture, awareness, path.     Initial gait and balance activities during functional mobility    Plan   Plan  Plan:  (3+)  Plan weeks: 1  Current Treatment Recommendations: Strengthening, ROM, Balance training, Functional mobility training, Endurance training, Transfer training, Gait training, ADL/Self-care training, Stair training, Patient/Caregiver education & training, Safety education & training, Pain management, Equipment evaluation, education, & procurement, Modalities, Positioning, Therapeutic activities  Safety Devices  Type of Devices: Call light within reach, Chair alarm in place, Gait belt, Patient at risk for falls, Left in chair  Restraints  Restraints Initially in Place: No     Restrictions  Restrictions/Precautions  Restrictions/Precautions: Fall Risk, General Precautions     Subjective   General  Chart Reviewed: Yes  Patient assessed for rehabilitation services?: Yes  Family / Caregiver Present: No  Follows Commands: Within Functional Limits  General Comment  Comments: 8/10 BL LE pain 2/2 neuropathy per pt  Subjective  Subjective: I'm not hopeless right?      Social/Functional History  Social/Functional History  Lives With: Spouse  Type of Home: House  Home Layout: Two level (has to ascend stairs)  Home Access: Stairs to enter without rails  Entrance Stairs - Number of Steps: 3  Bathroom Shower/Tub: Tub/Shower unit  Bathroom Toilet: Handicap height  Bathroom Accessibility: Walker accessible  Home Equipment: Cane  Has the patient had two or more falls in the past year or any fall with injury in the past year?: No (2 falls but without injury d/t LOC)  Receives Help From: Family (mainly wife)  ADL Assistance: Needs assistance (recent digression in ADLs/IADLs)  Homemaking Assistance: Needs assistance  Ambulation Assistance: Needs assistance  Transfer Assistance: Needs assistance  Active : No  Patient's  Info: wife drives  Occupation: On disability    Cognition   Orientation  Overall Orientation Status: Within Normal Limits  Orientation Level: Oriented X4  Cognition  Overall Cognitive Status: WFL     Objective   Heart Rate: 84  Heart Rate Source: Monitor  BP: (!) 185/86  BP Location: Left upper arm  BP Method: Automatic  Patient Position: Lying right side  MAP (Calculated): 119  Resp: 19  SpO2: 98 %  O2 Device: None (Room air)     Observation/Palpation  Posture: Good  Observation: Supine, awake, alert, agreeable. He is depressed about his current level of function and ongoing issues related to chronic diabetic pain. Bed alarmed, tele cart for vitals. AROM RLE (degrees)  RLE AROM: WFL  AROM LLE (degrees)  LLE AROM : WFL  Strength RLE  Strength RLE: WFL  Comment: grossly 3+/5  Strength LLE  Strength LLE: WFL  Comment: grossly 4-/5     Bed mobility  Supine to Sit: Stand by assistance (inc time and effort)  Scooting: Stand by assistance (at  EOB)  Transfers  Sit to Stand: Minimal Assistance (for transfer completion)  Stand to sit: Contact guard assistance  Bed to Chair: Minimal assistance (for transfer completion ; CGA to steady against impaired balance)  Ambulation  Surface: level tile  Device: Rolling Walker  Assistance: Minimal assistance;Contact guard assistance (intermittent min A for AD mgmt and steady)  Gait Deviations: Slow Jayshree; Increased KEELEY; Decreased step length;Decreased step height;Staggers  Distance: 25 ft  Comments: slow and guarded ; hypotensive and instability noted ; heavy UE reliance     Balance  Sitting - Static: Good  Sitting - Dynamic: Fair;+  Standing - Static: Fair;+  Standing - Dynamic: Fair;-    AM-PAC Score  AM-PAC Inpatient Mobility Raw Score : 15 (07/28/22 1352)  AM-PAC Inpatient T-Scale Score : 39.45 (07/28/22 1352)  Mobility Inpatient CMS 0-100% Score: 57.7 (07/28/22 1352)  Mobility Inpatient CMS G-Code Modifier : CK (07/28/22 1352)    Goals  Short Term Goals  Time Frame for Short term goals: 1 week  Short term goal 1: pt will complete bed mobility at [facility]  Short term goal 2: pt will complete transfers at [facility] 346 term goal 3: pt will ambulate 75 ft using FWW at [facility] 346 term goal 4: pt will demonstrate 5 mins of standing tolerance w/ minimal pt reported dizziness to allow for inc capacity to perfrom functional mobility  Patient Goals   Patient goals : improve independence and functional capactity      Therapy Time   Individual Concurrent Group Co-treatment   Time In 1155         Time Out 1235         Minutes 40         Timed Code Treatment Minutes: 30 Minutes (TA, GT)       Whitney Rueda, PT, DPT

## 2022-07-28 NOTE — PROGRESS NOTES
V2.0  [facility] [place]      Name:  Shama Bhatt /Age/Sex: 1975  (52 y.o. male)   MRN & CSN:  [id number] & [id number] Encounter Date/Time: 2022 10:46 AM EDT    Location:  [address] [place] [address] PCP: Shawn Ram MD       Hospital Day: 5    Assessment and Plan:   Shama Bhatt is a 52 y.o. 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, hypertension, hyperlipidemia, type 2 diabetes and CKD stage IV with baseline around 2.2 who presents with Syncope    Assessment and Plan    *Syncope, seems recurrent     Likely secondary to orthostatic hypotension and metabolic derangements  CT head within normal limits  EKG within normal limits  Neurology following, appreciate recommendations  Fall precautions  Consult cardiology to r/o cardiac cause   Plan to do cardiac stress test today  Patient refused tilt table test    *Acute on chronic anemia secondary to GI bleed  EGD 2022: Sandra-Roberson tear, no active bleed  Chronic normocytic anemia due to underlying chronic kidney disease  Started on EPO  Will resume Plavix once cleared by gastroenterology. Contacted primary care physician Dr. Teena Wright reports that the patient was last seen in office in 2021 and was not on Eliquis. discontinue Eliquis   Once GI clears we can resume aspirin and Plavix to complete 12 months of DAPT post PCI.   Continue IV Protonix twice daily  GI following, appreciate recommendations  Monitor CBC and transfuse if hemoglobin less than 7  Hb stable at 9.1    *End-stage renal disease on hemodialysis  -started HD 2022  Nephrology following, appreciate recommendations  Monitor BMP  Tunneled HD catheter before discharge, consulted interventional radiology  Case management working on outpatient HD set up pending hepatitis panel    *Type 2 diabetes with hyperglycemia  Basal insulin in addition to low-dose insulin sliding scale  Target blood glucose level 140-180  HbA1c 10%  Endocrinology following    *Intractable nausea/vomiting  -Improving  As needed antinausea medications    *Hyperkalemia  -Resolved after hemodialysis    Chronic conditions include:  *Coronary artery disease status post PCI in LAD (9/2021)  -Patient is on Plavix and statin outpatient  -Resume aspirin and Plavix after cleared by GI  *Hypertension:Hold antihypertensives in the setting of newly initiated hemodialysis  *Hyperlipidemia: Continue statin  *History of Irene's gangrene  *History of toe amputations    Diet ADULT DIET; Regular; 4 carb choices (60 gm/meal); Low Potassium (Less than 3000 mg/day); 2000 ml   DVT Prophylaxis [] Lovenox, []  Heparin, [x] SCDs, [] Ambulation,  [] Eliquis, [] Xarelto  [] Coumadin   Code Status Full Code   Disposition From: Home  Expected Disposition: Home  Estimated Date of Discharge:   Patient requires continued admission due to tunneled HD catheter placement, outpatient HD set up and IV Protonix/GI clearance.,  May need rehab placement   Surrogate Decision Maker/ POA Wife     Subjective:     Chief Complaint: Loss of Consciousness (Did not hit head, no blood thinners)       Mandy Mejia is a 52 y.o. male who presents with a syncopal episode. Patient was seen and evaluated at during hemodialysis. Patient reports mild nausea but states otherwise that his nausea/vomiting episodes have improved significantly since admission. Patient seems anxious about outcomes after this hospitalization. Detailed discussion regarding treatment plan which includes placement of a tunneled HD catheter and outpatient set up of hemodialysis. Patient apparently was supposed to be on Eliquis and Plavix but reports that he only takes Plavix. Upon chart review unsure about the reasoning of Eliquis.        The patient was seen and examined at bedside  Denies CP or SOB  Tunneled HD catheter placement by IR 7/27 , HD on Friday   Patient feels weak and interested in acute rehab placement  Consult pt/ot   Undergoing cardiac stress test   PT/OT for possible rehab placement , discussed with case management          Objective: Intake/Output Summary (Last 24 hours) at 7/28/2022 1016  Last data filed at 7/28/2022 0158  Gross per 24 hour   Intake --   Output 800 ml   Net -800 ml          Vitals:   Vitals:    07/28/22 0830   BP: (!) 185/86   Pulse: 81   Resp: 19   Temp: 98.1 °F (36.7 °C)   SpO2: 98%       Physical Exam:   Physical Exam  Constitutional:       General: He is not in acute distress. Appearance: He is not ill-appearing, toxic-appearing or diaphoretic. HENT:      Head: Normocephalic and atraumatic. Nose: Nose normal.      Mouth/Throat:      Mouth: Mucous membranes are dry. Pharynx: Oropharynx is clear. Eyes:      Conjunctiva/sclera: Conjunctivae normal.      Pupils: Pupils are equal, round, and reactive to light. Neck:      Comments: HD catheter in place undergoing hemodialysis  Cardiovascular:      Rate and Rhythm: Normal rate and regular rhythm. Pulses: Normal pulses. Heart sounds: Normal heart sounds. No murmur heard. Pulmonary:      Effort: Pulmonary effort is normal.      Breath sounds: Normal breath sounds. Abdominal:      Tenderness: There is abdominal tenderness (Improved from admission). There is no guarding or rebound. Musculoskeletal:      Cervical back: Normal range of motion and neck supple. Right lower leg: No edema. Left lower leg: No edema. Comments: Multiple toe amputations   Skin:     General: Skin is warm and dry. Neurological:      General: No focal deficit present. Mental Status: He is oriented to person, place, and time.    Psychiatric:      Comments: Anxious          Medications:   Medications:    alogliptin  6.25 mg Oral Daily    insulin lispro  20 Units SubCUTAneous TID WC    insulin glargine  35 Units SubCUTAneous Nightly    insulin lispro  0-8 Units SubCUTAneous TID WC    insulin lispro 0-8 Units SubCUTAneous 2 times per day    sucralfate  1 g Oral BID AC    atorvastatin  40 mg Oral Nightly    sertraline  100 mg Oral Daily    sodium chloride flush  5-40 mL IntraVENous 2 times per day    calcitRIOL  0.25 mcg Oral Daily    epoetin paola-epbx  10,000 Units IntraVENous Once per day on Mon Wed Fri    pantoprazole  40 mg IntraVENous BID      Infusions:    dextrose      sodium chloride 20 mL/hr at 07/26/22 0802     PRN Meds: oxyCODONE, 5 mg, Q8H PRN  glucose, 4 tablet, PRN  dextrose bolus, 125 mL, PRN   Or  dextrose bolus, 250 mL, PRN  glucagon (rDNA), 1 mg, PRN  dextrose, , Continuous PRN  sodium chloride flush, 5-40 mL, PRN  sodium chloride, , PRN  ondansetron, 4 mg, Q8H PRN   Or  ondansetron, 4 mg, Q6H PRN  polyethylene glycol, 17 g, Daily PRN  acetaminophen, 650 mg, Q6H PRN   Or  acetaminophen, 650 mg, Q6H PRN      Labs      Recent Results (from the past 24 hour(s))   POCT Glucose    Collection Time: 07/27/22 11:47 AM   Result Value Ref Range    POC Glucose 217 (H) 70 - 99 MG/DL   POCT Glucose    Collection Time: 07/27/22  4:05 PM   Result Value Ref Range    POC Glucose 116 (H) 70 - 99 MG/DL   POCT Glucose    Collection Time: 07/27/22  8:21 PM   Result Value Ref Range    POC Glucose 174 (H) 70 - 99 MG/DL   POCT Glucose    Collection Time: 07/28/22  2:12 AM   Result Value Ref Range    POC Glucose 250 (H) 70 - 99 MG/DL   POCT Glucose    Collection Time: 07/28/22  6:42 AM   Result Value Ref Range    POC Glucose 156 (H) 70 - 99 MG/DL   Comprehensive Metabolic Panel w/ Reflex to MG    Collection Time: 07/28/22  6:45 AM   Result Value Ref Range    Sodium 139 135 - 145 MMOL/L    Potassium 4.1 3.5 - 5.1 MMOL/L    Chloride 103 99 - 110 mMol/L    CO2 24 21 - 32 MMOL/L    BUN 42 (H) 6 - 23 MG/DL    Creatinine 5.5 (H) 0.9 - 1.3 MG/DL    Glucose 140 (H) 70 - 99 MG/DL    Calcium 7.3 (L) 8.3 - 10.6 MG/DL    Albumin 2.5 (L) 3.4 - 5.0 GM/DL    Total Protein 4.8 (L) 6.4 - 8.2 GM/DL    Total Bilirubin 0.2 0.0 - 1.0 MG/DL ALT 10 10 - 40 U/L    AST 11 (L) 15 - 37 IU/L    Alkaline Phosphatase 79 40 - 128 IU/L    GFR Non- 11 (L) >60 mL/min/1.73m2    GFR  14 (L) >60 mL/min/1.73m2    Anion Gap 12 4 - 16   CBC with Auto Differential    Collection Time: 07/28/22  6:45 AM   Result Value Ref Range    WBC 6.6 4.0 - 10.5 K/CU MM    RBC 3.06 (L) 4.6 - 6.2 M/CU MM    Hemoglobin 9.1 (L) 13.5 - 18.0 GM/DL    Hematocrit 27.3 (L) 42 - 52 %    MCV 89.2 78 - 100 FL    MCH 29.7 27 - 31 PG    MCHC 33.3 32.0 - 36.0 %    RDW 12.8 11.7 - 14.9 %    Platelets 234 755 - 981 K/CU MM    MPV 10.2 7.5 - 11.1 FL    Differential Type AUTOMATED DIFFERENTIAL     Segs Relative 68.2 (H) 36 - 66 %    Lymphocytes % 18.9 (L) 24 - 44 %    Monocytes % 9.4 (H) 0 - 4 %    Eosinophils % 2.4 0 - 3 %    Basophils % 0.3 0 - 1 %    Segs Absolute 4.5 K/CU MM    Lymphocytes Absolute 1.3 K/CU MM    Monocytes Absolute 0.6 K/CU MM    Eosinophils Absolute 0.2 K/CU MM    Basophils Absolute 0.0 K/CU MM    Nucleated RBC % 0.0 %    Total Nucleated RBC 0.0 K/CU MM    Total Immature Neutrophil 0.05 K/CU MM    Immature Neutrophil % 0.8 (H) 0 - 0.43 %        Imaging/Diagnostics Last 24 Hours   CT ABDOMEN PELVIS WO CONTRAST Additional Contrast? None    Result Date: 7/24/2022  EXAMINATION: CT OF THE ABDOMEN AND PELVIS WITHOUT CONTRAST 7/24/2022 9:58 pm TECHNIQUE: CT of the abdomen and pelvis was performed without the administration of intravenous contrast. Multiplanar reformatted images are provided for review. Automated exposure control, iterative reconstruction, and/or weight based adjustment of the mA/kV was utilized to reduce the radiation dose to as low as reasonably achievable. COMPARISON: 08/11/2021 HISTORY: ORDERING SYSTEM PROVIDED HISTORY: pain TECHNOLOGIST PROVIDED HISTORY: Reason for exam:->pain Additional Contrast?->None Reason for Exam: pain FINDINGS: Lower Chest: Partially imaged coronary artery calcification. Organs:  The liver is normal in contour and attenuation. Gallbladder is unremarkable. No biliary ductal dilatation. Pancreas, adrenals, kidneys, spleen are unremarkable. Mild calcific atherosclerosis. GI/Bowel: Left colonic diverticulosis. Bowel is nondilated without wall thickening. Appendix is normal. Pelvis: Unremarkable. Peritoneum/Retroperitoneum: No free air, free fluid, organized fluid collection, lymphadenopathy. Bones/Soft Tissues: No acute bone or soft tissue abnormality. No acute process in the abdomen or pelvis. CT HEAD WO CONTRAST    Result Date: 7/24/2022  EXAMINATION: CT OF THE HEAD WITHOUT CONTRAST  7/24/2022 9:57 pm TECHNIQUE: CT of the head was performed without the administration of intravenous contrast. Automated exposure control, iterative reconstruction, and/or weight based adjustment of the mA/kV was utilized to reduce the radiation dose to as low as reasonably achievable. COMPARISON: 11/25/2019 HISTORY: ORDERING SYSTEM PROVIDED HISTORY: syncope TECHNOLOGIST PROVIDED HISTORY: Has a \"code stroke\" or \"stroke alert\" been called? ->No Reason for exam:->syncope Decision Support Exception - unselect if not a suspected or confirmed emergency medical condition->Emergency Medical Condition (MA) Reason for Exam: syncope FINDINGS: BRAIN/VENTRICLES: There is no acute intracranial hemorrhage, mass effect or midline shift. No abnormal extra-axial fluid collection. The gray-white differentiation is maintained without evidence of an acute infarct. There is no evidence of hydrocephalus. ORBITS: The visualized portion of the orbits demonstrate no acute abnormality. SINUSES: The visualized paranasal sinuses and mastoid air cells demonstrate no acute abnormality. SOFT TISSUES/SKULL:  No acute abnormality of the visualized skull or soft tissues. No acute intracranial abnormality.      CT CERVICAL SPINE WO CONTRAST    Result Date: 7/24/2022  EXAMINATION: CT OF THE CERVICAL SPINE WITHOUT CONTRAST 7/24/2022 9:58 pm TECHNIQUE: CT of the cervical spine was performed without the administration of intravenous contrast. Multiplanar reformatted images are provided for review. Automated exposure control, iterative reconstruction, and/or weight based adjustment of the mA/kV was utilized to reduce the radiation dose to as low as reasonably achievable. COMPARISON: 07/09/2018 HISTORY: ORDERING SYSTEM PROVIDED HISTORY: fall TECHNOLOGIST PROVIDED HISTORY: Reason for exam:->fall Decision Support Exception - unselect if not a suspected or confirmed emergency medical condition->Emergency Medical Condition (MA) Reason for Exam: fall FINDINGS: BONES/ALIGNMENT: There is no acute fracture or traumatic malalignment. DEGENERATIVE CHANGES: No significant degenerative changes. SOFT TISSUES: There is no prevertebral soft tissue swelling. No acute abnormality of the cervical spine. XR CHEST PORTABLE    Result Date: 7/24/2022  EXAMINATION: ONE XRAY VIEW OF THE CHEST 7/24/2022 8:31 pm COMPARISON: 09/30/2021 HISTORY: ORDERING SYSTEM PROVIDED HISTORY: Chest pain TECHNOLOGIST PROVIDED HISTORY: Reason for exam:->Chest pain Reason for Exam: loss of consciousness Additional signs and symptoms: chest pain FINDINGS: Cardiomediastinal silhouette is unchanged in size. There is no pleural effusion or pneumothorax. There is no pulmonary consolidation. There is no acute osseous abnormality. No acute cardiopulmonary abnormality. US RETROPERITONEAL LIMITED    Result Date: 7/25/2022  EXAMINATION: ULTRASOUND OF THE KIDNEYS 7/24/2022 11:18 pm COMPARISON: None. HISTORY: ORDERING SYSTEM PROVIDED HISTORY: García TECHNOLOGIST PROVIDED HISTORY: Reason for exam:->García Reason for Exam: garcía FINDINGS: The right kidney measures 12.8 cm in length and the left kidney measures 13.2 cm in length. Kidneys demonstrate normal cortical echogenicity. No hydronephrosis or intrarenal stones. No focal lesions. Unremarkable ultrasound of the kidneys.        Electronically signed by [facility] Josue Holloway MD on 7/28/2022 at 10:16 AM

## 2022-07-28 NOTE — CONSULTS
Iliana 1878, 1975, 1119/[id number]-A, 7/28/2022      Discharge Recommendation: SNF    History:  [place]:  The primary encounter diagnosis was Syncope and collapse. Diagnoses of CARMEN (acute kidney injury) ([address] Hypocalcemia, Hyperglycemia, and Elevated troponin were also pertinent to this visit. Pt  has a past medical history of Abscess, Acute renal failure (ARF) ([address] Anxiety associated with depression, Anxiety associated with depression, Back pain, Chest pain, Diabetic nephropathy ([address] Diabetic neuropathy ([address] Diabetic ulcer of left midfoot associated with type 2 diabetes mellitus, with fat layer exposed ([address] Diverticulosis, DM (diabetes mellitus), type 2 ([address] 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, H/O percutaneous left heart catheterization, Hyperlipidemia LDL goal < 100, Hypertension, Internal hemorrhoid, Necrotizing fasciitis ([address] Nose fracture, Panic attacks, Pericarditis, Peripheral autonomic neuropathy due to diabetes mellitus ([address] Sebaceous cyst, URI (upper respiratory infection), WD-Wound, surgical, infected, initial encounter, and Wrist fracture.       Subjective:  Patient states: \"I wish I could get back to work, it makes me depressed being less able to care for myself\"  Pain: 8/10 (BLE) Quality: sharp, shooting   Communication with other providers: Co-eval with PT for pt safety and tolerance, RN handoff   Restrictions: general precautions, fall risk, orthostatic   No one at bedside    Home Setup/Prior level of function:  Social/Functional History  Lives With: Spouse  Type of Home: House  Home Layout: Two level (has to ascend stairs)  Home Access: Stairs to enter without rails  Entrance Stairs - Number of Steps: 3  Bathroom Shower/Tub: Tub/Shower unit  Bathroom Toilet: Handicap height  Bathroom Accessibility: Walker accessible  Home Equipment: [employer]  Has the patient had two or more falls in the past year or any fall with injury in the past year?: No (2 falls but without injury d/t LOC)  Receives Help From: Family (mainly wife)  ADL Assistance: Needs assistance (recent digression in ADLs/IADLs)  Homemaking Assistance: Needs assistance  Ambulation Assistance: Needs assistance  Transfer Assistance: Needs assistance  Active : No  Patient's  Info: wife drives  Occupation: On disability    Examination:  Observation: Pt was semifowlers upon arrival, agreeable to session  Vision: WFL  Hearing: WFL  Objective Measures:  BP readings:  /95  Recliner after ambulation 117/82  Standing 81/63     Body Systems and functions:  ROM: WFL in [address] [place] [address], with exception to bilat shoulders ~90 degrees   Strength: 3+/5 in BUE, L>R slightly, pt reported may be d/t pain from IV   Sensation: WFL, with exception to numbness/tingling on R volar aspect of hand/ wrist and L dorsal aspect of hand/ wrist  Tone: normotonic in BUE  Coordination: movements fluid and coordinated  Posture: normal posture  Activity Tolerance: Fair+     Activities of Daily Living (ADLs):  Feeding: SetupA  Grooming: SetupA   Toileting: Gertrude   UB dressing: Gertrude   LB dressing: Gertrude (EOB)   UB bathing: ModA  LB bathing: ModA       *pt ADL function inferred from gross functional assessment of mobility, balance, posture, safety awareness, activity tolerance (unless otherwise indicated)    Cognitive and Psychosocial Functioning:  Overall cognitive status: WFL  Affect: normal     Balance:   Sitting: CGA   Standing: Gertrude     Functional Mobility:  Rolling Laterally in Bed: NT  Supine to Sit: SBA with inc time and use of bed rails. Sit to Stand: Gertrude     Ambulation: Gertrude using 2WW       AM-PAC 6 click short form for inpatient daily activity:   How much help from another person does the patient currently need. .. Unable  Dep A Lot  Max A A Lot   Mod A A Little  Min A A Little   CGA  SBA None   Mod I  Indep  Sup   1. Putting on and taking off regular lower body clothing?  [] 1    [] 2   [] 2   [x] 3 [] 3   [] 4      2. Bathing (including washing, rinsing, drying)? [] 1   [] 2   [x] 2 [] 3 [] 3 [] 4   3. Toileting, which includes using toilet, bedpan, or urinal? [] 1    [] 2   [] 2   [x] 3   [] 3   [] 4     4. Putting on and taking off regular upper body clothing? [] 1   [] 2   [] 2   [x] 3   [] 3    [] 4      5. Taking care of personal grooming such as brushing teeth? [] 1   [] 2    [] 2 [] 3    [x] 3   [] 4      6. Eating meals? [] 1   [] 2   [] 2   [] 3   [x] 3   [] 4        Raw Score:  17      24/24 = unimpaired  23/24 = 1-20% impaired   20/24-22/24 = 21-40% impaired  15/24-19/24 = 41-59% impaired   10/24-14/24 = 60%-79% impaired  7/24-9/24 = 80%-99% impaired  6/24 = 100% impaired     Treatment:  Therapeutic Activity Training:   Therapeutic activity training was instructed today. Cues were given for safety, sequence, UE/LE placement, visual cues, and balance. Activities performed today included:  Bed mobility: Sup>Sit with SBA and inc time with use of bed rails. STS: x2 trials with Gertrude from EOB and recliner. Ambulation: Pt ambulated short of a functional household distance with Gertrude with 2WW in prep for safely ambulating functional household distance. Pt demo'd Moderate fatigue and reported feelings of dizziness with activity. Self Care Training:   Self care training was performed today. Cues were given for safety, sequence, UE/LE placement, visual cues, and balance. Activities performed today included:  LB dressing: Pt benefited from [facility] [address] for back of shoe while seated EOB prior to ambulation.        Education: Role of OT, OT POC, discharge needs, safety, benefits of EOB/OOB activity, AE needs, importance to address mental health for well-being, self-advocacy, reporting symptoms of medications to doctor to assist with finding appropriate medications   Safety Measures: Gait belt used for safety of pt and therapist, Left in recliner with lunch situated anteriorly, Alarm in place, call light and phone within reach, lines managed, all needs met     Assessment:  Pt is a 52 yr old male from home who presents with syncope. Diagnoses of CARMEN (acute kidney injury) ([address] Hypocalcemia, Hyperglycemia, and Elevated troponin were also pertinent to this visit. Prior to admission, pt was requiring inc in assistance for ADLs with progressive weakness overtime. Prior to decline, pt was completing ADLs and IADLs independently. Since progressive decline, pt unable to work and progressively requiring inc in assistance for basic ADLs. Pt would benefit from a SNF stay to address barriers d/t dec in functional mobility and requiring to ascend flight of stairs to access aspects of home for ADLs, to address falls prevention, and to inc overall level of independence for safety. Pt would benefit from continued IP OT services during their stay, and discharge to SNF.     Complexity: Moderate   Prognosis: Good  Barriers: home accessibility, strength, endurance, evolving medical comorbidities, mental health (reported feelings of depression)     Plan:  Plan: 3+/week    Treatment to include: Strengthening, ROM, Balance Training, Functional Mobility Training, Endurance Training, Gait Training, Pain Management, Safety Education and Training, Patient+Caregiver Education and Training, Equipment Evaluation Education and Procurement, Positioning, Self Care Training, Home Management Training, Coordination Training    Pt would benefit from continued edu on: mental health, OOB activity, safety   Adaptive Equipment Recommendations: Continue to monitor/ defer to next LOC     Goals:  Time frame for goals: 2 weeks  Pt will complete feeding tasks with independence at seated level   Pt will complete grooming tasks with independence at standing level with chair nearby for breaks PRN  Pt will complete toileting tasks with CGa using elevated commode and grab bars   Pt will complete UB dressing tasks with independence EOB   Pt will complete LB dressing tasks with SetupA and AE PRN EOB   Pt will complete UB bathing tasks with Gertrude while seated and AE PRN   Pt will complete LB bathing tasks with Gertrude while seated and AE PRN   Pt will complete therapeutic exercise/activity to increase independence in ADL/IADL function  Pt will practice functional transfers and mobility with AD for increased safety and independence    Time:   Time in: 1155  Time out: 1233  Treatment Minutes: 28  Evaluation Minutes: 10  Total time: 38    Electronically signed by:    SERGIO [place]/TRISHA   License: [id number]  1/49/4293, 12:03 PM

## 2022-07-28 NOTE — PROGRESS NOTES
Nephrology Progress Note        [address], [address] [address]  Phone: [phone]  Office Hours: 8:30AM - 4:30PM  Monday - Friday 7/28/2022 7:04 AM  Subjective:   Admit Date: 7/24/2022  PCP: Elfego Virk MD  Interval History:   DOING OK  Pain from HD cath insertion site    Diet: ADULT DIET; Regular; 4 carb choices (60 gm/meal); Low Potassium (Less than 3000 mg/day)      Data:   Scheduled Meds:   alogliptin  6.25 mg Oral Daily    insulin lispro  20 Units SubCUTAneous TID WC    insulin glargine  35 Units SubCUTAneous Nightly    insulin lispro  0-8 Units SubCUTAneous TID WC    insulin lispro  0-8 Units SubCUTAneous 2 times per day    sucralfate  1 g Oral BID AC    atorvastatin  40 mg Oral Nightly    sertraline  100 mg Oral Daily    sodium chloride flush  5-40 mL IntraVENous 2 times per day    calcitRIOL  0.25 mcg Oral Daily    epoetin paola-epbx  10,000 Units IntraVENous Once per day on Mon Wed Fri    pantoprazole  40 mg IntraVENous BID     Continuous Infusions:   dextrose      sodium chloride 20 mL/hr at 07/26/22 0802     PRN Meds:oxyCODONE, glucose, dextrose bolus **OR** dextrose bolus, glucagon (rDNA), dextrose, sodium chloride flush, sodium chloride, ondansetron **OR** ondansetron, polyethylene glycol, acetaminophen **OR** acetaminophen  I/O last 3 completed shifts:  In: -   Out: 1050 [Urine:1050]  No intake/output data recorded.     Intake/Output Summary (Last 24 hours) at 7/28/2022 0704  Last data filed at 7/28/2022 0158  Gross per 24 hour   Intake --   Output 800 ml   Net -800 ml       CBC:   Recent Labs     07/26/22  0230 07/27/22  0530   WBC 7.5 7.6   HGB 8.9* 9.0*    221       BMP:    Recent Labs     07/25/22 2048 07/26/22  0230 07/27/22  0530    139 139   K 5.1 4.5 4.3    106 102   CO2 19* 20* 25   BUN 49* 47* 38*   CREATININE 4.3* 4.8* 4.7*   GLUCOSE 160* 144* 195*     Hepatic:   Recent Labs     07/26/22  0230 07/27/22  0530   AST 8* 9*   ALT 7* fasciitis ([address] 05/24/2021    Morbid obesity with body mass index (BMI) of 40.0 to 44.9 in adult [facility]) 05/21/2021    Irene gangrene     Cellulitis, scrotum 05/13/2021    Acute epididymitis     Cellulitis of left arm 04/03/2021    Cellulitis 03/23/2021    Acute renal failure (ARF) ([address] 08/04/2019    Diabetic foot infection ([address] 03/21/2019    Intractable nausea and vomiting 01/11/2019    Uncontrolled type 2 diabetes mellitus ([address] 01/11/2019    Nausea and vomiting 01/11/2019    Constipation 10/07/2018    Cellulitis of left foot     Sepsis ([address] 42/34/2187    Periumbilical hernia     Abscess 12/03/2017    Osteomyelitis ([address] 05/22/2015    Bronchitis 10/15/2013    Hyperlipidemia with target LDL less than 100 07/15/2013    Essential hypertension 07/15/2013    Bilateral carpal tunnel syndrome- left worse than right 06/24/2013    DANIELA (obstructive sleep apnea) 06/20/2013    Urinary frequency 06/20/2013    Neck pain 05/16/2013    Anxiety associated with depression     Panic attack 02/01/2012    Diabetic neuropathy ([address] 12/22/2011    Hiatal hernia 02/04/2011    Diabetes mellitus type 2, improved controlled 02/04/2011     -Tunnelled HD cath in place  -Will keep him as CARMEN for now and monitor for renal recovery  -Continue calcitriol as outpt too  -Next HD tomorrow since he will be MWF as outpt  -Will follow                  Electronically signed by Celine Blizzard, DO on 7/28/2022 at 7:04 AM    [address] DO [address]  [place], [place] [address]  PHONE: [phone]  FAX: [phone]

## 2022-07-29 ENCOUNTER — APPOINTMENT (OUTPATIENT)
Dept: NUCLEAR MEDICINE | Age: 47
DRG: 368 | End: 2022-07-29
Payer: MEDICARE

## 2022-07-29 LAB
ALBUMIN SERPL-MCNC: 2.6 GM/DL (ref 3.4–5)
ALP BLD-CCNC: 80 IU/L (ref 40–128)
ALT SERPL-CCNC: 12 U/L (ref 10–40)
ANION GAP SERPL CALCULATED.3IONS-SCNC: 11 MMOL/L (ref 4–16)
AST SERPL-CCNC: 12 IU/L (ref 15–37)
BILIRUB SERPL-MCNC: 0.2 MG/DL (ref 0–1)
BUN BLDV-MCNC: 52 MG/DL (ref 6–23)
CALCIUM SERPL-MCNC: 7.6 MG/DL (ref 8.3–10.6)
CHLORIDE BLD-SCNC: 103 MMOL/L (ref 99–110)
CO2: 25 MMOL/L (ref 21–32)
CREAT SERPL-MCNC: 6.5 MG/DL (ref 0.9–1.3)
GFR AFRICAN AMERICAN: 11 ML/MIN/1.73M2
GFR NON-AFRICAN AMERICAN: 9 ML/MIN/1.73M2
GLUCOSE BLD-MCNC: 147 MG/DL (ref 70–99)
GLUCOSE BLD-MCNC: 154 MG/DL (ref 70–99)
GLUCOSE BLD-MCNC: 156 MG/DL (ref 70–99)
GLUCOSE BLD-MCNC: 176 MG/DL (ref 70–99)
GLUCOSE BLD-MCNC: 230 MG/DL (ref 70–99)
HCT VFR BLD CALC: 24.4 % (ref 42–52)
HEMOGLOBIN: 8.2 GM/DL (ref 13.5–18)
LV EF: 36 %
LVEF MODALITY: NORMAL
MCH RBC QN AUTO: 30.6 PG (ref 27–31)
MCHC RBC AUTO-ENTMCNC: 33.6 % (ref 32–36)
MCV RBC AUTO: 91 FL (ref 78–100)
PDW BLD-RTO: 12.9 % (ref 11.7–14.9)
PLATELET # BLD: 223 K/CU MM (ref 140–440)
PMV BLD AUTO: 10.5 FL (ref 7.5–11.1)
POTASSIUM SERPL-SCNC: 4.3 MMOL/L (ref 3.5–5.1)
RBC # BLD: 2.68 M/CU MM (ref 4.6–6.2)
SODIUM BLD-SCNC: 139 MMOL/L (ref 135–145)
TOTAL PROTEIN: 5.1 GM/DL (ref 6.4–8.2)
WBC # BLD: 7.3 K/CU MM (ref 4–10.5)

## 2022-07-29 PROCEDURE — 6370000000 HC RX 637 (ALT 250 FOR IP): Performed by: INTERNAL MEDICINE

## 2022-07-29 PROCEDURE — 6370000000 HC RX 637 (ALT 250 FOR IP): Performed by: STUDENT IN AN ORGANIZED HEALTH CARE EDUCATION/TRAINING PROGRAM

## 2022-07-29 PROCEDURE — 6360000002 HC RX W HCPCS: Performed by: INTERNAL MEDICINE

## 2022-07-29 PROCEDURE — C9113 INJ PANTOPRAZOLE SODIUM, VIA: HCPCS | Performed by: INTERNAL MEDICINE

## 2022-07-29 PROCEDURE — 93017 CV STRESS TEST TRACING ONLY: CPT

## 2022-07-29 PROCEDURE — 2580000003 HC RX 258: Performed by: INTERNAL MEDICINE

## 2022-07-29 PROCEDURE — 1200000000 HC SEMI PRIVATE

## 2022-07-29 PROCEDURE — 78452 HT MUSCLE IMAGE SPECT MULT: CPT

## 2022-07-29 PROCEDURE — A9500 TC99M SESTAMIBI: HCPCS | Performed by: INTERNAL MEDICINE

## 2022-07-29 PROCEDURE — 82962 GLUCOSE BLOOD TEST: CPT

## 2022-07-29 PROCEDURE — 99233 SBSQ HOSP IP/OBS HIGH 50: CPT | Performed by: INTERNAL MEDICINE

## 2022-07-29 PROCEDURE — 90935 HEMODIALYSIS ONE EVALUATION: CPT

## 2022-07-29 PROCEDURE — 94761 N-INVAS EAR/PLS OXIMETRY MLT: CPT

## 2022-07-29 PROCEDURE — 85027 COMPLETE CBC AUTOMATED: CPT

## 2022-07-29 PROCEDURE — 3430000000 HC RX DIAGNOSTIC RADIOPHARMACEUTICAL: Performed by: INTERNAL MEDICINE

## 2022-07-29 PROCEDURE — 80053 COMPREHEN METABOLIC PANEL: CPT

## 2022-07-29 RX ORDER — CLOPIDOGREL BISULFATE 75 MG/1
75 TABLET ORAL DAILY
Status: DISCONTINUED | OUTPATIENT
Start: 2022-07-29 | End: 2022-08-03 | Stop reason: HOSPADM

## 2022-07-29 RX ORDER — REGADENOSON 0.08 MG/ML
0.4 INJECTION, SOLUTION INTRAVENOUS
Status: COMPLETED | OUTPATIENT
Start: 2022-07-29 | End: 2022-07-29

## 2022-07-29 RX ORDER — AMINOPHYLLINE DIHYDRATE 25 MG/ML
75 INJECTION, SOLUTION INTRAVENOUS ONCE
Status: COMPLETED | OUTPATIENT
Start: 2022-07-29 | End: 2022-07-29

## 2022-07-29 RX ORDER — ASPIRIN 81 MG/1
81 TABLET, CHEWABLE ORAL DAILY
Status: DISCONTINUED | OUTPATIENT
Start: 2022-07-29 | End: 2022-08-03 | Stop reason: HOSPADM

## 2022-07-29 RX ADMIN — KIT FOR THE PREPARATION OF TECHNETIUM TC99M SESTAMIBI 30 MILLICURIE: 1 INJECTION, POWDER, LYOPHILIZED, FOR SOLUTION PARENTERAL at 09:00

## 2022-07-29 RX ADMIN — ONDANSETRON 4 MG: 2 INJECTION INTRAMUSCULAR; INTRAVENOUS at 07:57

## 2022-07-29 RX ADMIN — AMINOPHYLLINE 75 MG: 25 INJECTION, SOLUTION INTRAVENOUS at 08:23

## 2022-07-29 RX ADMIN — KIT FOR THE PREPARATION OF TECHNETIUM TC99M SESTAMIBI 10 MILLICURIE: 1 INJECTION, POWDER, LYOPHILIZED, FOR SOLUTION PARENTERAL at 08:00

## 2022-07-29 RX ADMIN — ATORVASTATIN CALCIUM 40 MG: 40 TABLET, FILM COATED ORAL at 22:20

## 2022-07-29 RX ADMIN — INSULIN GLARGINE 35 UNITS: 100 INJECTION, SOLUTION SUBCUTANEOUS at 22:27

## 2022-07-29 RX ADMIN — OXYCODONE HYDROCHLORIDE 5 MG: 5 TABLET ORAL at 15:44

## 2022-07-29 RX ADMIN — EPOETIN ALFA-EPBX 10000 UNITS: 10000 INJECTION, SOLUTION INTRAVENOUS; SUBCUTANEOUS at 10:22

## 2022-07-29 RX ADMIN — SODIUM CHLORIDE, PRESERVATIVE FREE 10 ML: 5 INJECTION INTRAVENOUS at 22:19

## 2022-07-29 RX ADMIN — SUCRALFATE 1 G: 1 TABLET ORAL at 15:44

## 2022-07-29 RX ADMIN — PANTOPRAZOLE SODIUM 40 MG: 40 INJECTION, POWDER, FOR SOLUTION INTRAVENOUS at 22:19

## 2022-07-29 RX ADMIN — OXYCODONE HYDROCHLORIDE 5 MG: 5 TABLET ORAL at 06:15

## 2022-07-29 RX ADMIN — INSULIN LISPRO 2 UNITS: 100 INJECTION, SOLUTION INTRAVENOUS; SUBCUTANEOUS at 22:27

## 2022-07-29 RX ADMIN — SUCRALFATE 1 G: 1 TABLET ORAL at 06:15

## 2022-07-29 RX ADMIN — REGADENOSON 0.4 MG: 0.08 INJECTION, SOLUTION INTRAVENOUS at 08:22

## 2022-07-29 ASSESSMENT — PAIN DESCRIPTION - LOCATION
LOCATION: FOOT
LOCATION: NECK

## 2022-07-29 ASSESSMENT — PAIN SCALES - GENERAL
PAINLEVEL_OUTOF10: 7
PAINLEVEL_OUTOF10: 0
PAINLEVEL_OUTOF10: 8

## 2022-07-29 ASSESSMENT — PAIN DESCRIPTION - ORIENTATION: ORIENTATION: RIGHT;LEFT

## 2022-07-29 ASSESSMENT — PAIN DESCRIPTION - DESCRIPTORS: DESCRIPTORS: ACHING

## 2022-07-29 NOTE — CARE COORDINATION
Chart reviewed and spoke with pt to continue discharge planning. CM introduced self and explained role. CM advised pt of therapy recommendations of SNF. Pt states he is unsure about SNF but would consider ARU. CM advised pt that he may not qualify for ARU per insurance guidelines and may need SNF. Pt stated he would talk to his wife about possible SNF placement. Referral sent to [facility], ARU admissions.

## 2022-07-29 NOTE — PROGRESS NOTES
Occupational Therapy  Attempted to see pt on this date for OT session. Pt off floor at this time for dialysis. Will attempt as able and appropriate.     Douglas MILLER/TRISHA

## 2022-07-29 NOTE — PROGRESS NOTES
Patient is asking what can she do till procedure scheduled on 4/12/19. Patient stated that she has been taking IBP and it is not helping she is also using ice and heat.    Progress Note( Dr. Aureliano Summers)  7/28/2022  Subjective:   Admit Date: 7/24/2022  PCP: Negar Huntley MD    Admitted For :Generalized weakness and syncope       Consulted For:  Better control of blood glucose    Interval History: Had EGD done this morning shows  ) Sandra-Roberson tear , no active bleed at this point                2) normal stomach, normal esophagus                3) normal duodenum  Also patient had hemodialysis yesterday and again today    Denies any chest pains,   Denies SOB . Denies nausea or vomiting. No new bowel or bladder symptoms.        Intake/Output Summary (Last 24 hours) at 7/28/2022 2226  Last data filed at 7/28/2022 2223  Gross per 24 hour   Intake --   Output 900 ml   Net -900 ml         DATA    CBC:   Recent Labs     07/26/22  0230 07/27/22  0530 07/28/22  0645   WBC 7.5 7.6 6.6   HGB 8.9* 9.0* 9.1*    221 203      CMP:  Recent Labs     07/26/22  0230 07/27/22  0530 07/28/22  0645    139 139   K 4.5 4.3 4.1    102 103   CO2 20* 25 24   BUN 47* 38* 42*   CREATININE 4.8* 4.7* 5.5*   CALCIUM 7.8* 7.8* 7.3*   PROT 4.8* 4.7* 4.8*   LABALBU 2.2* 2.5* 2.5*   BILITOT 0.2 0.1 0.2   ALKPHOS 80 82 79   AST 8* 9* 11*   ALT 7* 8* 10       Lipids:   Lab Results   Component Value Date/Time    CHOL 145 05/13/2021 10:28 AM    CHOL 168 10/15/2013 10:30 AM    HDL 29 05/13/2021 10:28 AM    TRIG 188 05/13/2021 10:28 AM     Glucose:  Recent Labs     07/28/22  1105 07/28/22  1552 07/28/22 2005   POCGLU 189* 201* 215*       [id number]:  Lab Results   Component Value Date/Time    LABA1C 10.0 07/25/2022 06:50 AM     High Sensitivity TSH:   Lab Results   Component Value Date/Time    TSHHS 3.860 07/25/2022 06:50 AM     Free T3: No results found for: FT3  Free T4:  Lab Results   Component Value Date/Time    T4FREE 1.25 07/24/2017 11:10 AM       IR TUNNELED CVC PLACE WO SQ PORT/PUMP > 5 YEARS   Final Result   Successful ultrasound and fluoroscopy guided Port-A-Cath/tunneled dialysis access catheter placement via right internal jugular venous approach. XR CHEST PORTABLE   Final Result   Temper dialysis access catheter via right lower cervical approach with tip in   the mid right atrium. No complication suggested. Underlying findings consistent with congestive heart failure/hypervolemia s/p   or low lung volumes without detectable pleural effusion. US RETROPERITONEAL LIMITED   Final Result   Unremarkable ultrasound of the kidneys. CT ABDOMEN PELVIS WO CONTRAST Additional Contrast? None   Final Result   No acute process in the abdomen or pelvis. CT CERVICAL SPINE WO CONTRAST   Final Result   No acute abnormality of the cervical spine. CT HEAD WO CONTRAST   Final Result   No acute intracranial abnormality. XR CHEST PORTABLE   Final Result   No acute cardiopulmonary abnormality.          IR NONTUNNELED VASCULAR CATHETER > 5 YEARS    (Results Pending)   NM MYOCARDIAL SPECT REST EXERCISE OR RX    (Results Pending)        Scheduled Medicines   Medications:    alogliptin  6.25 mg Oral Daily    insulin lispro  20 Units SubCUTAneous TID WC    insulin glargine  35 Units SubCUTAneous Nightly    insulin lispro  0-8 Units SubCUTAneous TID WC    insulin lispro  0-8 Units SubCUTAneous 2 times per day    sucralfate  1 g Oral BID AC    atorvastatin  40 mg Oral Nightly    sertraline  100 mg Oral Daily    sodium chloride flush  5-40 mL IntraVENous 2 times per day    calcitRIOL  0.25 mcg Oral Daily    epoetin paola-epbx  10,000 Units IntraVENous Once per day on Mon Wed Fri    pantoprazole  40 mg IntraVENous BID      Infusions:    dextrose      sodium chloride 20 mL/hr at 07/26/22 0802         Objective:   Vitals: BP (!) 158/91   Pulse 86   Temp 98.4 °F (36.9 °C) (Oral)   Resp 18   Ht 5' 9\" (1.753 m)   Wt (!) 306 lb 7 oz (139 kg)   SpO2 92%   BMI 45.25 kg/m²   General appearance: alert and cooperative with exam  Neck: no JVD or bruit  Thyroid : Normal lobes Lungs: Has Vesicular Breath sounds   Heart:  regular rate and rhythm  Abdomen: soft, non-tender; bowel sounds normal; no masses,  no organomegaly  Musculoskeletal: Normal  Extremities: extremities normal, , bilateral leg edema  Neurologic:  Awake, alert, oriented to name, place and time. Cranial nerves II-XII are grossly intact. Motor is  intact.   Sensory neuropathy,  and gait is normal.    Assessment:     Patient Active Problem List:     Hiatal hernia     Diabetes mellitus type 2, improved controlled     Diabetic neuropathy (HCC)     Panic attack     Anxiety associated with depression     Neck pain     DANIELA (obstructive sleep apnea)     Urinary frequency     Bilateral carpal tunnel syndrome- left worse than right     Hyperlipidemia with target LDL less than 100     Essential hypertension     Bronchitis     Osteomyelitis (HCC)     Abscess     Periumbilical hernia     Sepsis (HCC)     Cellulitis of left foot     Constipation     Intractable nausea and vomiting     Uncontrolled type 2 diabetes mellitus (HCC)     Nausea and vomiting     Diabetic foot infection ([address]     Acute renal failure (ARF) (HCC)     Cellulitis     Cellulitis of left arm     Cellulitis, scrotum     Acute epididymitis     Irene gangrene     Recurrent major depressive disorder, in full remission ([address]     Generalized anxiety disorder     Morbid obesity with body mass index (BMI) of 40.0 to 44.9 in adult [facility])     Necrotizing fasciitis ([address]     Syncope     Right groin wound     Ulcer of right groin with fat layer exposed ([address]     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 type 2 diabetes mellitus, with muscle involvement without evidence of necrosis (HCC)     Nephrotic syndrome     Generalized edema     Stage 3b chronic kidney disease ([address]     Diabetic nephropathy associated with type 2 diabetes mellitus (HCC)     Hypoalbuminemia     Chronic kidney disease, stage IV (severe) (HCC)     FH: CAD (coronary artery disease) ASCVD (arteriosclerotic cardiovascular disease)     Other proteinuria     Chest pain     Surgical wound dehiscence     CARMEN (acute kidney injury) ([facility]     Anemia      Plan:     Reviewed POC blood glucose . Labs and X ray results   Reviewed Current Medicines   On meal/ Correction bolus Humalog/ Basal Lantus Insulin regime  Monitor Blood glucose frequently   Modified  the dose of Insulin/ other medicines as needed   Discussed with nephrology patient going to have long-term hemodialysis  Will follow     .      Vito Machuca MD, MD

## 2022-07-29 NOTE — PROGRESS NOTES
Chest pain    Surgical wound dehiscence    CARMEN (acute kidney injury) ([facility]    Anemia         Treatment:  Hemodilaysis 2:1  Priority: Routine  Location: Acute Room    Diabetic: No  NPO: No  Isolation Precautions: Dialysis     Consent for Treatment Verified: Yes  Blood Consent Verified: Not Applicable     Safety Verified: Identify (I), Consent (C), Equipment (E), HepB Status (B), Orders Complete (O), Access Verified (A), and Timeliness (T)  Time out performed prior to access at 0945 hours. Report Received from Primary RN at 0930 hours. Primary RN (First Initial, Last Name, Title): Felipe Holly RN  Incapacitated Nurse Education Completed: Not Applicable     HBsAg ONLY:  Date Drawn: June 25, 2022       Results: Negative  HBsAb:  Date Drawn:  July 25, 2022       Results: Susceptible <10    Order  Dialysis Bath  K+ (Potassium): 2  Ca+ (Calcium): 3 (3.5)  Na+ (Sodium): 138  HCO3 (Bicarb): 30  Bicarbonate Concentrate Lot No.: [id number]  Acid Concentrate Lot No.: [id number]     Na+ Modeling: Not Applicable  Dialyzer: [id number]  Dialysate Temperature (C):  35  Blood Flow Rate (BFR):  250   Dialysate Flow Rate (DFR):   600        Access to be Utilized   Access:  Tunneled Catheter  Location: Internal Jugular  Side: Right   Needle gauge:  Not Applicable  + Bruit/Thrill: Not Applicable    First Use X-ray Verified: Not Applicable  OK to use line order: Not Applicable    Site Assessment:  Signs and Symptoms of Infection/Inflammation: None  If yes: Not Applicable  Dressing: Dry and Intact  Site Prep: Medical Aseptic Technique  Dressing Changed this Treatment: No  If yes, by whom: NA - not changed today  Date of Last Dressing Change: July 28, 2022  Antimicrobial Patch in place?: Yes  Red Alcohol Caps in place?: Yes  Gauze Dressing?: No  Non Dialysis Use?: No  Comment:    Flows: Good, Patent  If access problem, who was notified:     Pre and Post-Assessment  Patient Vitals for the past 8 hrs:   Level of Consciousness Oriented X Heart Rhythm Respiratory Quality/Effort O2 Device Bilateral Breath Sounds Skin Color Skin Condition/Temp Appetite Abdomen Inspection Bowel Sounds (All Quadrants) Edema Comments Pain Level   07/29/22 0615 -- -- -- -- -- -- -- -- -- -- -- -- -- 8   07/29/22 0945 Alert (0) 4 Regular Unlabored None (Room air) Clear Pink Warm Good Soft Active None PT REPORTS MILD NAUSEA DT NOT EATING PT DENIES CP AND sob; PT AOX4, PT BREATHING REGULAR UNLABORED AND EQUAL BREATH SOUNDS WITH MILD EDEMA IN ALL EXTREMETIES 0   07/29/22 1300 Alert (0) 4 Regular Unlabored None (Room air) Clear [place] Dry;Warm -- Soft Active None -- --     Labs  Recent Labs     07/27/22  0530 07/28/22  0645 07/29/22  0600   WBC 7.6 6.6 7.3   HGB 9.0* 9.1* 8.2*   HCT 27.3* 27.3* 24.4*    203 223                                                                  Recent Labs     07/27/22  0530 07/28/22  0645 07/29/22  0600    139 139   K 4.3 4.1 4.3    103 103   CO2 25 24 25   BUN 38* 42* 52*   CREATININE 4.7* 5.5* 6.5*   GLUCOSE 195* 140* 156*     IV Drips and Rate/Dose   dextrose      sodium chloride 20 mL/hr at 07/26/22 0802      Safety - Before each treatment:   Dialysis Machine No.: [id number]   Machine Number: [id number]  Dialyzer Lot No.: [id number]  RO Machine Log Sheet Completed: Yes  Machine Alarm Self Test: Completed; Passed (07/29/22 0945)     Air Foam Detector: Tested, Proper Function  Extracorporeal Circuit Tested for Integrity: Yes  Machine Conductivity: 14  Manual Conductivity: 14  Machine Ph: 7.3  Bicarbonate Concentrate Lot No.: [id number]  Acid Concentrate Lot No.: [id number]  Manual Ph: 7.3  Bleach Test (Neg): Yes  Bath Temperature: 95 °F (35 °C)  Tubing Lot#: [id number]  Conductivity Meter Serial #: [id number]  All Connections Secure?: Yes  Venous Parameters Set?: Yes  Arterial Parameters Set?: Yes  Saline Line Double Clamped?: Yes  Air Foam Detector Engaged?: Yes  Machine Functioning Alarm Free?  Yes  Prime Given: 200ml    Chlorine Testing - Before each treatment and every 4 hours:   Treatment  Time On: 0955  Time Off: 1255  Treatment Goal: 3  Weight: (!) 309 lb 8.4 oz (140.4 kg) (07/29/22 1300)  1st check: less than 0.1 ppm at: 0645 hours  2nd check: less than 0.1 ppm at: 1000 hours  3rd check: Not Applicable  (if greater than 0.1 ppm, then check every 30 minutes from secondary)    Access Flows and Pressures  Patient Vitals for the past 8 hrs:   Blood Flow Rate (ml/min) Ultrafiltration Rate (ml/hr) Arterial Pressure (mmHg) Venous Pressure (mmHg) TMP DFR Comments Access Visible   07/29/22 0959 300 ml/min 1000 ml/hr -120 mmHg 120 50 700 PT TX STARTED; PT REPORTS NAUSEA; PT LINES CAPPED AND CLAMPED NO S/S INFECTION; DID REPORT MILD PAIN AT CVC DT NEW LINE PLACEMENT Yes   07/29/22 1000 300 ml/min 1000 ml/hr 90 mmHg 80 50 700 AWAKE AND ALERT Yes   07/29/22 1015 250 ml/min 1000 ml/hr -50 mmHg 80 500 700 RETACRIT GIVEN Yes   07/29/22 1030 250 ml/min 1000 ml/hr -70 mmHg 90 50 600 denies needs Yes   07/29/22 1045 250 ml/min 1000 ml/hr -50 mmHg 60 50 600 DENIES COMPLAINTS Yes   07/29/22 1100 250 ml/min 1000 ml/hr -70 mmHg 90 50 600 lines secure Yes   07/29/22 1115 250 ml/min 1000 ml/hr -80 mmHg 90 50 600 no complaints Yes   07/29/22 1130 250 ml/min 1000 ml/hr -80 mmHg 90 50 600 resting quietly Yes   07/29/22 1145 250 ml/min 1000 ml/hr -70 mmHg 90 50 600 RESTING WITH EYES CLOSED Yes   07/29/22 1200 250 ml/min 1000 ml/hr -70 mmHg 90 50 600 NO COMPLAINTS Yes   07/29/22 1215 250 ml/min 1000 ml/hr -80 mmHg 110 50 600 DENIES NEEDS Yes   07/29/22 1230 250 ml/min 100 ml/hr -80 mmHg 130 50 600 VSS Yes   07/29/22 1245 250 ml/min 1000 ml/hr -80 mmHg 130 50 600 snoring Yes   07/29/22 1255 -- -- -- -- -- -- tx ended Yes     Vital Signs  Patient Vitals for the past 8 hrs:   BP Temp Pulse Resp SpO2 Weight Weight Method Percent Weight Change   07/29/22 0615 -- -- -- 18 -- -- -- --   07/29/22 0645 -- -- -- 18 -- -- -- --   07/29/22 0945 -- -- -- -- -- (!) 315 lb 11.2 oz (143.2 kg) Bed scale 0.49   07/29/22 1000 (!) 141/89 -- 82 17 -- -- -- --   07/29/22 1015 (!) 168/100 -- 82 14 -- -- -- --   07/29/22 1030 (!) 167/83 -- 83 20 -- -- -- --   07/29/22 1045 (!) 177/97 -- 85 15 -- -- -- --   07/29/22 1100 (!) 162/88 -- 83 16 -- -- -- --   07/29/22 1115 (!) 166/101 -- 83 12 -- -- -- --   07/29/22 1130 (!) 163/106 -- 80 11 -- -- -- --   07/29/22 1145 (!) 148/97 -- 80 11 -- -- -- --   07/29/22 1200 (!) 152/100 -- 81 13 -- -- -- --   07/29/22 1215 (!) 124/94 -- 81 -- -- -- -- --   07/29/22 1230 (!) 142/100 -- 81 16 -- -- -- --   07/29/22 1245 109/74 -- 82 13 -- -- -- --   07/29/22 1255 (!) 127/90 -- 81 14 -- -- -- --   07/29/22 1300 (!) 146/96 98 °F (36.7 °C) 81 13 96 % (!) 309 lb 8.4 oz (140.4 kg) -- -1.96     Post-Dialysis  Arterial Catheter Locking Solution:  NS  Venous Catheter Locking Solution:  NS  Post-Treatment Procedures: Blood returned, Catheter Capped, clamped with Saline x2 ports  Machine Disinfection Process: Acid/Vinegar Clean, Heat Disinfect, Exterior Machine Disinfection  Rinseback Volume (ml): 300 ml  Blood Volume Processed (Liters): 42.5 l/min  Dialyzer Clearance: Heavily streaked  Duration of Treatment (minutes): 180 minutes     Hemodialysis Intake (ml): 500 ml  Hemodialysis Output (ml): 3000 ml     Tolerated Treatment: Good  Patient Response to Treatment: no complaints          Provider Notification        Handoff complete and report given to Primary RN at 1308 hours.   Primary RN (First Initial, Last Name, Title):  Dante Duff RN     Education  Person Educated: Patient   Knowledge Base: Substantial  Barriers to Learning?: None  Preferred method of Learning: Hands-on  Topic(s): Access Care, Signs and Symptoms of Infection, and Procedural   Teaching Tools: Explanation   Response to Education: Verbalized Understanding     Electronically signed by Surya Linares RN on 7/29/2022 at 1:08 PM

## 2022-07-29 NOTE — PROGRESS NOTES
Nephrology  Dialysis Note        [address] [address]  Phone: [phone]  Office Hours: 8:30AM - 4:30PM  Monday - Friday          PROCEDURE:  Patient seen during hemodialysis      PHYSICIAN:  ANTHONY      INDICATION:  End-stage renal disease      RX:  See dialysis flowsheet for specifics on access, blood flow rate, dialysate baths, duration of dialysis, anticoagulation and other technical information.       COMMENTS:  SEEN ON HD  SET TO LOSE 2.5KG IF TOLERATED  NEXT OUTPT HD ON Monday 5:30AM  [facility]    Electronically signed by Vianney Chowdary DO on 7/29/2022 at 10:31 AM    [facility]  [address] [address],  [address] [address]  PHONE: [phone]  FAX: [phone]

## 2022-07-29 NOTE — PROGRESS NOTES
Physical Therapy  Hold Note    Pt at dialysis on chart review prior to attempt, cardiology planning stress test today, will hold pt today 2/2 concerns for tolerance of progressive ambulation following extensive am/pm interventions. Will cont to follow.     Alethea Hashimoto DPT, PT

## 2022-07-29 NOTE — PROGRESS NOTES
Comprehensive Nutrition Assessment    Type and Reason for Visit:  Initial (LOS)    Nutrition Recommendations/Plan:   Continue current diet   Will order renal nutrition supplement once daily   Please encourage and record PO intake TID      Malnutrition Assessment:  Malnutrition Status:  Insufficient data (07/29/22 1451)    Context:  Acute Illness       Nutrition Assessment:    Pt admitted with syncope and anemia. Known to RD team from previous admissions. With diabetic oral supplement last admission. No PO intakes are being recorded this admission. Nutrition Related Findings:    2.5L removed on last HD session. Wound Type: None       Current Nutrition Intake & Therapies:    Average Meal Intake: Unable to assess  Average Supplements Intake: None Ordered  ADULT DIET; Regular; 4 carb choices (60 gm/meal); Low Potassium (Less than 3000 mg/day); 2000 ml    Anthropometric Measures:  Height: 5' 9\" (175.3 cm)  Ideal Body Weight (IBW): 160 lbs (73 kg)    Admission Body Weight: 300 lb (136.1 kg) (stated)  Current Body Weight: 309 lb 8.4 oz (140.4 kg), 193.5 % IBW.  Weight Source: Bed Scale  Current BMI (kg/m2): 45.7  Usual Body Weight: 297 lb 6.4 oz (134.9 kg) (December 2021)  % Weight Change (Calculated): 4.1                    BMI Categories: Obese Class 3 (BMI 40.0 or greater)    Estimated Daily Nutrient Needs:  Energy Requirements Based On: Kcal/kg  Weight Used for Energy Requirements: Ideal  Energy (kcal/day): 8577-1510  Weight Used for Protein Requirements: Ideal  Protein (g/day): 87-95     Fluid (ml/day): per nephrology    Nutrition Diagnosis:   Impaired nutrient utilization related to renal dysfunction as evidenced by lab values    Nutrition Interventions:   Food and/or Nutrient Delivery: Continue Current Diet, Start Oral Nutrition Supplement  Nutrition Education/Counseling: Education not indicated  Coordination of Nutrition Care: Continue to monitor while inpatient       Goals:     Goals: Meet at least 75% of estimated needs, by next RD assessment       Nutrition Monitoring and Evaluation:   Behavioral-Environmental Outcomes: None Identified  Food/Nutrient Intake Outcomes: Food and Nutrient Intake, Supplement Intake  Physical Signs/Symptoms Outcomes: Weight, Fluid Status or Edema    Discharge Planning:     Too soon to determine     Luis Sigala RD, LD  Contact: [phone]

## 2022-07-29 NOTE — PROGRESS NOTES
V2.0  [facility] [place] [facility]ist Progress Note      Name:  Dante Wakefield /Age/Sex: 1975  (52 y.o. male)   MRN & CSN:  [id number] & [id number] Encounter Date/Time: 2022 10:46 AM EDT    Location:  [address] [place] [address] PCP: Elfego Virk MD       Hospital Day: 6    Assessment and Plan:   Dante Wakefield is a 52 y.o. 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, hypertension, hyperlipidemia, type 2 diabetes and CKD stage IV with baseline around 2.2 who presents with Syncope    Assessment and Plan    *Syncope, seems recurrent     Likely secondary to orthostatic hypotension and metabolic derangements  CT head within normal limits  EKG within normal limits  Neurology following, appreciate recommendations  Fall precautions  Consult cardiology to r/o cardiac cause    cardiac stress test today  Patient refused tilt table test    *Acute on chronic anemia secondary to GI bleed  EGD 2022: Sandra-Roberson tear, no active bleed  Chronic normocytic anemia due to underlying chronic kidney disease  Started on EPO   Resumed on  Plavix as the GI signed off , was Contacted primary care physician Dr. Sarina Sandoval reports that the patient was last seen in office in 2021 and was not on Eliquis. discontinue Eliquis   , resumed on  aspirin and Plavix to complete 12 months of DAPT post PCI.   Continue IV Protonix twice daily, switched to oral on d/c   GI signed out   Monitor CBC and transfuse if hemoglobin less than 7  Hb stable at 8.2    *End-stage renal disease on hemodialysis  -started HD 2022  Nephrology following, appreciate recommendations  Monitor BMP  Tunneled HD catheter before discharge, consulted interventional radiology  Case management working on outpatient HD set up pending hepatitis panel    *Type 2 diabetes with hyperglycemia  Basal insulin in addition to low-dose insulin sliding scale  Target blood glucose level 140-180  HbA1c 10%  Endocrinology following    *Intractable nausea/vomiting  -Improving  As needed antinausea medications    *Hyperkalemia  -Resolved after hemodialysis    Chronic conditions include:  *Coronary artery disease status post PCI in LAD (9/2021)  -Patient is on Plavix and statin outpatient  -Resume aspirin and Plavix after cleared by GI  *Hypertension:Hold antihypertensives in the setting of newly initiated hemodialysis  *Hyperlipidemia: Continue statin  *History of Irene's gangrene  *History of toe amputations    Diet ADULT DIET; Regular; 4 carb choices (60 gm/meal); Low Potassium (Less than 3000 mg/day); 2000 ml   DVT Prophylaxis [] Lovenox, []  Heparin, [x] SCDs, [] Ambulation,  [] Eliquis, [] Xarelto  [] Coumadin   Code Status Full Code   Disposition From: Home  Expected Disposition: Home  Estimated Date of Discharge:   Patient requires continued admission due to tunneled HD catheter placement, outpatient HD set up and IV Protonix/GI clearance.,  May need rehab placement   Surrogate Decision Maker/ POA Wife     Subjective:     Chief Complaint: Loss of Consciousness (Did not hit head, no blood thinners)       Andra Webster is a 52 y.o. male who presents with a syncopal episode. Patient was seen and evaluated at during hemodialysis. Patient reports mild nausea but states otherwise that his nausea/vomiting episodes have improved significantly since admission. Patient seems anxious about outcomes after this hospitalization. Detailed discussion regarding treatment plan which includes placement of a tunneled HD catheter and outpatient set up of hemodialysis. Patient apparently was supposed to be on Eliquis and Plavix but reports that he only takes Plavix. Upon chart review unsure about the reasoning of Eliquis.        The patient was seen and examined at bedside  Denies CP or SOB  Tunneled HD catheter placement by IR 7/27 , HD on Friday 7/29  Patient feels weak and interested in acute rehab placement  Consult sucralfate  1 g Oral BID AC    atorvastatin  40 mg Oral Nightly    sertraline  100 mg Oral Daily    sodium chloride flush  5-40 mL IntraVENous 2 times per day    calcitRIOL  0.25 mcg Oral Daily    epoetin paola-epbx  10,000 Units IntraVENous Once per day on Mon Wed Fri    pantoprazole  40 mg IntraVENous BID      Infusions:    dextrose      sodium chloride 20 mL/hr at 07/26/22 0802     PRN Meds: oxyCODONE, 5 mg, Q8H PRN  glucose, 4 tablet, PRN  dextrose bolus, 125 mL, PRN   Or  dextrose bolus, 250 mL, PRN  glucagon (rDNA), 1 mg, PRN  dextrose, , Continuous PRN  sodium chloride flush, 5-40 mL, PRN  sodium chloride, , PRN  ondansetron, 4 mg, Q8H PRN   Or  ondansetron, 4 mg, Q6H PRN  polyethylene glycol, 17 g, Daily PRN  acetaminophen, 650 mg, Q6H PRN   Or  acetaminophen, 650 mg, Q6H PRN      Labs      Recent Results (from the past 24 hour(s))   POCT Glucose    Collection Time: 07/28/22 11:05 AM   Result Value Ref Range    POC Glucose 189 (H) 70 - 99 MG/DL   POCT Glucose    Collection Time: 07/28/22  3:52 PM   Result Value Ref Range    POC Glucose 201 (H) 70 - 99 MG/DL   POCT Glucose    Collection Time: 07/28/22  8:05 PM   Result Value Ref Range    POC Glucose 215 (H) 70 - 99 MG/DL   POCT Glucose    Collection Time: 07/29/22  3:50 AM   Result Value Ref Range    POC Glucose 176 (H) 70 - 99 MG/DL   CBC    Collection Time: 07/29/22  6:00 AM   Result Value Ref Range    WBC 7.3 4.0 - 10.5 K/CU MM    RBC 2.68 (L) 4.6 - 6.2 M/CU MM    Hemoglobin 8.2 (L) 13.5 - 18.0 GM/DL    Hematocrit 24.4 (L) 42 - 52 %    MCV 91.0 78 - 100 FL    MCH 30.6 27 - 31 PG    MCHC 33.6 32.0 - 36.0 %    RDW 12.9 11.7 - 14.9 %    Platelets 373 851 - 392 K/CU MM    MPV 10.5 7.5 - 11.1 FL   Comprehensive Metabolic Panel    Collection Time: 07/29/22  6:00 AM   Result Value Ref Range    Sodium 139 135 - 145 MMOL/L    Potassium 4.3 3.5 - 5.1 MMOL/L    Chloride 103 99 - 110 mMol/L    CO2 25 21 - 32 MMOL/L    BUN 52 (H) 6 - 23 MG/DL    Creatinine 6.5 (H) 0.9 - 1.3 MG/DL    Glucose 156 (H) 70 - 99 MG/DL    Calcium 7.6 (L) 8.3 - 10.6 MG/DL    Albumin 2.6 (L) 3.4 - 5.0 GM/DL    Total Protein 5.1 (L) 6.4 - 8.2 GM/DL    Total Bilirubin 0.2 0.0 - 1.0 MG/DL    ALT 12 10 - 40 U/L    AST 12 (L) 15 - 37 IU/L    Alkaline Phosphatase 80 40 - 128 IU/L    GFR Non- 9 (L) >60 mL/min/1.73m2    GFR  11 (L) >60 mL/min/1.73m2    Anion Gap 11 4 - 16   POCT Glucose    Collection Time: 07/29/22  6:17 AM   Result Value Ref Range    POC Glucose 154 (H) 70 - 99 MG/DL        Imaging/Diagnostics Last 24 Hours   CT ABDOMEN PELVIS WO CONTRAST Additional Contrast? None    Result Date: 7/24/2022  EXAMINATION: CT OF THE ABDOMEN AND PELVIS WITHOUT CONTRAST 7/24/2022 9:58 pm TECHNIQUE: CT of the abdomen and pelvis was performed without the administration of intravenous contrast. Multiplanar reformatted images are provided for review. Automated exposure control, iterative reconstruction, and/or weight based adjustment of the mA/kV was utilized to reduce the radiation dose to as low as reasonably achievable. COMPARISON: 08/11/2021 HISTORY: ORDERING SYSTEM PROVIDED HISTORY: pain TECHNOLOGIST PROVIDED HISTORY: Reason for exam:->pain Additional Contrast?->None Reason for Exam: pain FINDINGS: Lower Chest: Partially imaged coronary artery calcification. Organs: The liver is normal in contour and attenuation. Gallbladder is unremarkable. No biliary ductal dilatation. Pancreas, adrenals, kidneys, spleen are unremarkable. Mild calcific atherosclerosis. GI/Bowel: Left colonic diverticulosis. Bowel is nondilated without wall thickening. Appendix is normal. Pelvis: Unremarkable. Peritoneum/Retroperitoneum: No free air, free fluid, organized fluid collection, lymphadenopathy. Bones/Soft Tissues: No acute bone or soft tissue abnormality. No acute process in the abdomen or pelvis.      CT HEAD WO CONTRAST    Result Date: 7/24/2022  EXAMINATION: CT OF THE HEAD WITHOUT CONTRAST 7/24/2022 9:57 pm TECHNIQUE: CT of the head was performed without the administration of intravenous contrast. Automated exposure control, iterative reconstruction, and/or weight based adjustment of the mA/kV was utilized to reduce the radiation dose to as low as reasonably achievable. COMPARISON: 11/25/2019 HISTORY: ORDERING SYSTEM PROVIDED HISTORY: syncope TECHNOLOGIST PROVIDED HISTORY: Has a \"code stroke\" or \"stroke alert\" been called? ->No Reason for exam:->syncope Decision Support Exception - unselect if not a suspected or confirmed emergency medical condition->Emergency Medical Condition (MA) Reason for Exam: syncope FINDINGS: BRAIN/VENTRICLES: There is no acute intracranial hemorrhage, mass effect or midline shift. No abnormal extra-axial fluid collection. The gray-white differentiation is maintained without evidence of an acute infarct. There is no evidence of hydrocephalus. ORBITS: The visualized portion of the orbits demonstrate no acute abnormality. SINUSES: The visualized paranasal sinuses and mastoid air cells demonstrate no acute abnormality. SOFT TISSUES/SKULL:  No acute abnormality of the visualized skull or soft tissues. No acute intracranial abnormality. CT CERVICAL SPINE WO CONTRAST    Result Date: 7/24/2022  EXAMINATION: CT OF THE CERVICAL SPINE WITHOUT CONTRAST 7/24/2022 9:58 pm TECHNIQUE: CT of the cervical spine was performed without the administration of intravenous contrast. Multiplanar reformatted images are provided for review. Automated exposure control, iterative reconstruction, and/or weight based adjustment of the mA/kV was utilized to reduce the radiation dose to as low as reasonably achievable.  COMPARISON: 07/09/2018 HISTORY: ORDERING SYSTEM PROVIDED HISTORY: fall TECHNOLOGIST PROVIDED HISTORY: Reason for exam:->fall Decision Support Exception - unselect if not a suspected or confirmed emergency medical condition->Emergency Medical Condition (MA) Reason for Exam: fall FINDINGS: BONES/ALIGNMENT: There is no acute fracture or traumatic malalignment. DEGENERATIVE CHANGES: No significant degenerative changes. SOFT TISSUES: There is no prevertebral soft tissue swelling. No acute abnormality of the cervical spine. XR CHEST PORTABLE    Result Date: 7/24/2022  EXAMINATION: ONE XRAY VIEW OF THE CHEST 7/24/2022 8:31 pm COMPARISON: 09/30/2021 HISTORY: ORDERING SYSTEM PROVIDED HISTORY: Chest pain TECHNOLOGIST PROVIDED HISTORY: Reason for exam:->Chest pain Reason for Exam: loss of consciousness Additional signs and symptoms: chest pain FINDINGS: Cardiomediastinal silhouette is unchanged in size. There is no pleural effusion or pneumothorax. There is no pulmonary consolidation. There is no acute osseous abnormality. No acute cardiopulmonary abnormality. US RETROPERITONEAL LIMITED    Result Date: 7/25/2022  EXAMINATION: ULTRASOUND OF THE KIDNEYS 7/24/2022 11:18 pm COMPARISON: None. HISTORY: ORDERING SYSTEM PROVIDED HISTORY: García TECHNOLOGIST PROVIDED HISTORY: Reason for exam:->García Reason for Exam: garcía FINDINGS: The right kidney measures 12.8 cm in length and the left kidney measures 13.2 cm in length. Kidneys demonstrate normal cortical echogenicity. No hydronephrosis or intrarenal stones. No focal lesions. Unremarkable ultrasound of the kidneys.        Electronically signed by Joi Villar MD on 7/29/2022 at 10:29 AM

## 2022-07-29 NOTE — PROGRESS NOTES
Scrotal surgery (N/A, 5/15/2021); Scrotal surgery (N/A, 5/16/2021); Foot surgery (Left, 12/21/2021); Upper gastrointestinal endoscopy (N/A, 7/26/2022); and IR TUNNELED CVC PLACE WO SQ PORT/PUMP > 5 YEARS (7/27/2022). Social History:   reports that he has been smoking cigarettes. He has never used smokeless tobacco. He reports that he does not currently use alcohol. He reports current drug use. Frequency: 7.00 times per week. Drug: Marijuana [place] [place]). Family history:  family history includes ADHD in his daughter; Bleeding Prob in his mother; Cancer in his mother; Diabetes in his father and sister; Heart Disease in his father; High Blood Pressure in his father, sister, and another family member; Kidney Disease in his father; Mental Illness in his sister and another family member; Obesity in his father and sister; Other in his son; Stroke in his mother. Allergies   Allergen Reactions    Adhesive Tape Rash    Doxycycline Nausea And Vomiting    Reglan [Metoclopramide] Anxiety         Objective:   BP (!) 146/96   Pulse 81   Temp 98 °F (36.7 °C)   Resp 13   Ht 5' 9\" (1.753 m)   Wt (!) 309 lb 8.4 oz (140.4 kg)   SpO2 96%   BMI 45.71 kg/m²     Intake/Output Summary (Last 24 hours) at 7/29/2022 1540  Last data filed at 7/29/2022 1255  Gross per 24 hour   Intake 500 ml   Output 3875 ml   Net -3375 ml       TELEMETRY:     Physical Exam:  Constitutional:  Well developed, Well nourished, No acute distress, Non-toxic appearance. HENT:  Normocephalic, Atraumatic, Bilateral external ears normal, Oropharynx moist, No oral exudates, Nose normal. Neck- Normal range of motion, No tenderness, Supple, No stridor. Eyes:  PERRL, EOMI, Conjunctiva normal, No discharge. Respiratory:  Normal breath sounds, No respiratory distress, No wheezing, No chest tenderness. ,no use of accessory muscles, diaphragm movement is normal  Cardiovascular: (PMI) apex non displaced,no lifts no thrills, no s3,no s4, Normal heart rate, Normal rhythm, No murmurs, No rubs, No gallops. Carotid arteries pulse and amplitude are normal no bruit, no abdominal bruit noted ( normal abdominal aorta ausculation), femoral arteries pulse and amplitude are normal no bruit, pedal pulses are normal  GI:  Bowel sounds normal, Soft, No tenderness, No masses, No pulsatile masses. : External genitalia appear normal, No masses or lesions. No discharge. No CVA tenderness. Musculoskeletal:  Intact distal pulses, No edema, No tenderness, No cyanosis, No clubbing. Good range of motion in all major joints. No tenderness to palpation or major deformities noted. Back- No tenderness. Integument:  Warm, Dry, No erythema, No rash. Lymphatic:  No lymphadenopathy noted. Neurologic:  Alert & oriented x 3, Normal motor function, Normal sensory function, No focal deficits noted.    Psychiatric:  Affect normal, Judgment normal, Mood normal.     Medications:    clopidogrel  75 mg Oral Daily    aspirin  81 mg Oral Daily    alogliptin  6.25 mg Oral Daily    insulin lispro  20 Units SubCUTAneous TID WC    insulin glargine  35 Units SubCUTAneous Nightly    insulin lispro  0-8 Units SubCUTAneous TID WC    insulin lispro  0-8 Units SubCUTAneous 2 times per day    sucralfate  1 g Oral BID AC    atorvastatin  40 mg Oral Nightly    sertraline  100 mg Oral Daily    sodium chloride flush  5-40 mL IntraVENous 2 times per day    calcitRIOL  0.25 mcg Oral Daily    epoetin paola-epbx  10,000 Units IntraVENous Once per day on Mon Wed Fri    pantoprazole  40 mg IntraVENous BID      dextrose      sodium chloride 20 mL/hr at 07/26/22 0802     oxyCODONE, glucose, dextrose bolus **OR** dextrose bolus, glucagon (rDNA), dextrose, sodium chloride flush, sodium chloride, ondansetron **OR** ondansetron, polyethylene glycol, acetaminophen **OR** acetaminophen    Lab Data:  CBC:   Recent Labs     07/27/22  0530 07/28/22  0645 07/29/22  0600   WBC 7.6 6.6 7.3   HGB 9.0* 9.1* 8.2*   HCT 27.3* 27.3* 24.4*   MCV 89.5 89.2 91.0    203 223     BMP:   Recent Labs     07/27/22  0530 07/28/22  0645 07/29/22  0600    139 139   K 4.3 4.1 4.3    103 103   CO2 25 24 25   BUN 38* 42* 52*   CREATININE 4.7* 5.5* 6.5*     LIVER PROFILE:   Recent Labs     07/27/22  0530 07/28/22  0645 07/29/22  0600   AST 9* 11* 12*   ALT 8* 10 12   BILITOT 0.1 0.2 0.2   ALKPHOS 82 79 80     PT/INR: No results for input(s): PROTIME, INR in the last 72 hours. APTT: No results for input(s): APTT in the last 72 hours. BNP:  No results for input(s): BNP in the last 72 hours. TROPONIN: @TROPONINI:3@      Assessment:  52 y. o.year old who is admitted for          Plan:  Recurrent syncope, patient is known to have orthostatic hypotension and syncope related to weight, this time he was walking and passed out, he recently started dialysis, will continue to watch his blood pressure and patient refused to do tilt able test, will recommend compression socks and avoid standing still for long time  \CAD :patient history of PCI of the LAD we, stress test normal.continue aspirin statin, patient was on metoprolol 25 mg p.o. twice daily and Norvasc with thiazide, will adjust medications  END Stage renal disease on dialysis  Diabetes stable  All labs, medications and tests reviewed, continue all other medications of all above medical condition listed as is.       Luis Armando Mobley MD, MD 7/29/2022 3:40 PM

## 2022-07-30 ENCOUNTER — APPOINTMENT (OUTPATIENT)
Dept: MRI IMAGING | Age: 47
DRG: 368 | End: 2022-07-30
Payer: MEDICARE

## 2022-07-30 LAB
ALBUMIN SERPL-MCNC: 2.5 GM/DL (ref 3.4–5)
ALP BLD-CCNC: 95 IU/L (ref 40–128)
ALT SERPL-CCNC: 11 U/L (ref 10–40)
ANION GAP SERPL CALCULATED.3IONS-SCNC: 10 MMOL/L (ref 4–16)
AST SERPL-CCNC: 10 IU/L (ref 15–37)
BILIRUB SERPL-MCNC: 0.2 MG/DL (ref 0–1)
BUN BLDV-MCNC: 42 MG/DL (ref 6–23)
CALCIUM SERPL-MCNC: 7.8 MG/DL (ref 8.3–10.6)
CHLORIDE BLD-SCNC: 111 MMOL/L (ref 99–110)
CO2: 25 MMOL/L (ref 21–32)
CREAT SERPL-MCNC: 5.8 MG/DL (ref 0.9–1.3)
GFR AFRICAN AMERICAN: 13 ML/MIN/1.73M2
GFR NON-AFRICAN AMERICAN: 11 ML/MIN/1.73M2
GLUCOSE BLD-MCNC: 131 MG/DL (ref 70–99)
GLUCOSE BLD-MCNC: 165 MG/DL (ref 70–99)
GLUCOSE BLD-MCNC: 184 MG/DL (ref 70–99)
GLUCOSE BLD-MCNC: 201 MG/DL (ref 70–99)
GLUCOSE BLD-MCNC: 203 MG/DL (ref 70–99)
GLUCOSE BLD-MCNC: 222 MG/DL (ref 70–99)
GLUCOSE BLD-MCNC: 266 MG/DL (ref 70–99)
HCT VFR BLD CALC: 26.9 % (ref 42–52)
HEMOGLOBIN: 8.8 GM/DL (ref 13.5–18)
MCH RBC QN AUTO: 29.7 PG (ref 27–31)
MCHC RBC AUTO-ENTMCNC: 32.7 % (ref 32–36)
MCV RBC AUTO: 90.9 FL (ref 78–100)
PDW BLD-RTO: 13 % (ref 11.7–14.9)
PLATELET # BLD: 213 K/CU MM (ref 140–440)
PMV BLD AUTO: 10.1 FL (ref 7.5–11.1)
POTASSIUM SERPL-SCNC: 4.3 MMOL/L (ref 3.5–5.1)
RBC # BLD: 2.96 M/CU MM (ref 4.6–6.2)
SODIUM BLD-SCNC: 146 MMOL/L (ref 135–145)
TOTAL PROTEIN: 5 GM/DL (ref 6.4–8.2)
WBC # BLD: 8 K/CU MM (ref 4–10.5)

## 2022-07-30 PROCEDURE — 6370000000 HC RX 637 (ALT 250 FOR IP): Performed by: INTERNAL MEDICINE

## 2022-07-30 PROCEDURE — C9113 INJ PANTOPRAZOLE SODIUM, VIA: HCPCS | Performed by: INTERNAL MEDICINE

## 2022-07-30 PROCEDURE — 82962 GLUCOSE BLOOD TEST: CPT

## 2022-07-30 PROCEDURE — 94761 N-INVAS EAR/PLS OXIMETRY MLT: CPT

## 2022-07-30 PROCEDURE — 2580000003 HC RX 258: Performed by: INTERNAL MEDICINE

## 2022-07-30 PROCEDURE — 6370000000 HC RX 637 (ALT 250 FOR IP): Performed by: HOSPITALIST

## 2022-07-30 PROCEDURE — 6370000000 HC RX 637 (ALT 250 FOR IP): Performed by: STUDENT IN AN ORGANIZED HEALTH CARE EDUCATION/TRAINING PROGRAM

## 2022-07-30 PROCEDURE — 1200000000 HC SEMI PRIVATE

## 2022-07-30 PROCEDURE — 85027 COMPLETE CBC AUTOMATED: CPT

## 2022-07-30 PROCEDURE — 36415 COLL VENOUS BLD VENIPUNCTURE: CPT

## 2022-07-30 PROCEDURE — 6360000002 HC RX W HCPCS: Performed by: INTERNAL MEDICINE

## 2022-07-30 PROCEDURE — 80053 COMPREHEN METABOLIC PANEL: CPT

## 2022-07-30 PROCEDURE — 70551 MRI BRAIN STEM W/O DYE: CPT

## 2022-07-30 RX ORDER — INSULIN GLARGINE 100 [IU]/ML
40 INJECTION, SOLUTION SUBCUTANEOUS NIGHTLY
Status: DISCONTINUED | OUTPATIENT
Start: 2022-07-30 | End: 2022-08-02

## 2022-07-30 RX ADMIN — CALCITRIOL CAPSULES 0.25 MCG 0.25 MCG: 0.25 CAPSULE ORAL at 11:08

## 2022-07-30 RX ADMIN — INSULIN LISPRO 20 UNITS: 100 INJECTION, SOLUTION INTRAVENOUS; SUBCUTANEOUS at 10:50

## 2022-07-30 RX ADMIN — INSULIN LISPRO 20 UNITS: 100 INJECTION, SOLUTION INTRAVENOUS; SUBCUTANEOUS at 13:03

## 2022-07-30 RX ADMIN — INSULIN LISPRO 2 UNITS: 100 INJECTION, SOLUTION INTRAVENOUS; SUBCUTANEOUS at 13:03

## 2022-07-30 RX ADMIN — ATORVASTATIN CALCIUM 40 MG: 40 TABLET, FILM COATED ORAL at 20:07

## 2022-07-30 RX ADMIN — SODIUM CHLORIDE, PRESERVATIVE FREE 10 ML: 5 INJECTION INTRAVENOUS at 10:54

## 2022-07-30 RX ADMIN — SUCRALFATE 1 G: 1 TABLET ORAL at 16:08

## 2022-07-30 RX ADMIN — INSULIN LISPRO 4 UNITS: 100 INJECTION, SOLUTION INTRAVENOUS; SUBCUTANEOUS at 03:06

## 2022-07-30 RX ADMIN — INSULIN LISPRO 20 UNITS: 100 INJECTION, SOLUTION INTRAVENOUS; SUBCUTANEOUS at 18:31

## 2022-07-30 RX ADMIN — ASPIRIN 81 MG CHEWABLE TABLET 81 MG: 81 TABLET CHEWABLE at 11:08

## 2022-07-30 RX ADMIN — SODIUM CHLORIDE, PRESERVATIVE FREE 10 ML: 5 INJECTION INTRAVENOUS at 20:07

## 2022-07-30 RX ADMIN — ALOGLIPTIN 6.25 MG: 12.5 TABLET, FILM COATED ORAL at 11:08

## 2022-07-30 RX ADMIN — OXYCODONE HYDROCHLORIDE 5 MG: 5 TABLET ORAL at 20:06

## 2022-07-30 RX ADMIN — CLOPIDOGREL BISULFATE 75 MG: 75 TABLET ORAL at 11:08

## 2022-07-30 RX ADMIN — INSULIN GLARGINE 40 UNITS: 100 INJECTION, SOLUTION SUBCUTANEOUS at 23:07

## 2022-07-30 RX ADMIN — SERTRALINE HYDROCHLORIDE 100 MG: 50 TABLET ORAL at 11:09

## 2022-07-30 RX ADMIN — PANTOPRAZOLE SODIUM 40 MG: 40 INJECTION, POWDER, FOR SOLUTION INTRAVENOUS at 13:06

## 2022-07-30 RX ADMIN — OXYCODONE HYDROCHLORIDE 5 MG: 5 TABLET ORAL at 10:48

## 2022-07-30 RX ADMIN — OXYCODONE HYDROCHLORIDE 5 MG: 5 TABLET ORAL at 00:16

## 2022-07-30 ASSESSMENT — PAIN DESCRIPTION - ORIENTATION
ORIENTATION: RIGHT

## 2022-07-30 ASSESSMENT — PAIN SCALES - GENERAL
PAINLEVEL_OUTOF10: 6
PAINLEVEL_OUTOF10: 0
PAINLEVEL_OUTOF10: 7
PAINLEVEL_OUTOF10: 5
PAINLEVEL_OUTOF10: 2
PAINLEVEL_OUTOF10: 7
PAINLEVEL_OUTOF10: 6
PAINLEVEL_OUTOF10: 5
PAINLEVEL_OUTOF10: 5
PAINLEVEL_OUTOF10: 7
PAINLEVEL_OUTOF10: 6

## 2022-07-30 ASSESSMENT — PAIN DESCRIPTION - DESCRIPTORS
DESCRIPTORS: THROBBING;TENDER
DESCRIPTORS: THROBBING;TENDER
DESCRIPTORS: ACHING;DISCOMFORT
DESCRIPTORS: TENDER;THROBBING
DESCRIPTORS: DISCOMFORT;ACHING
DESCRIPTORS: THROBBING;TENDER
DESCRIPTORS: ACHING;DISCOMFORT
DESCRIPTORS: THROBBING;TENDER

## 2022-07-30 ASSESSMENT — PAIN DESCRIPTION - FREQUENCY
FREQUENCY: CONTINUOUS

## 2022-07-30 ASSESSMENT — PAIN DESCRIPTION - LOCATION
LOCATION: OTHER (COMMENT)
LOCATION: NECK
LOCATION: OTHER (COMMENT)
LOCATION: OTHER (COMMENT)
LOCATION: NECK;FOOT
LOCATION: OTHER (COMMENT)
LOCATION: NECK
LOCATION: OTHER (COMMENT)
LOCATION: NECK

## 2022-07-30 ASSESSMENT — PAIN DESCRIPTION - PAIN TYPE
TYPE: SURGICAL PAIN

## 2022-07-30 ASSESSMENT — PAIN DESCRIPTION - ONSET
ONSET: ON-GOING

## 2022-07-30 ASSESSMENT — PAIN SCALES - WONG BAKER
WONGBAKER_NUMERICALRESPONSE: 0

## 2022-07-30 NOTE — PROGRESS NOTES
Progress Note( Dr. Sarita Cabrera)  7/30/2022  Subjective:   Admit Date: 7/24/2022  PCP: Beba Barrientos MD    Admitted For :Generalized weakness and syncope       Consulted For:  Better control of blood glucose    Interval History: Had EGD done this morning shows  ) Sandra-Roberson tear , no active bleed at this point                2) normal stomach, normal esophagus                3) normal duodenum  Cardiac stress test negative for any CAD  Patient unable to do tilt test    Denies any chest pains,   Denies SOB . Denies nausea or vomiting. No new bowel or bladder symptoms.        Intake/Output Summary (Last 24 hours) at 7/30/2022 1054  Last data filed at 7/29/2022 2300  Gross per 24 hour   Intake 740 ml   Output 3000 ml   Net -2260 ml         DATA    CBC:   Recent Labs     07/28/22  0645 07/29/22  0600 07/30/22  0556   WBC 6.6 7.3 8.0   HGB 9.1* 8.2* 8.8*    223 213      CMP:  Recent Labs     07/28/22  0645 07/29/22  0600 07/30/22  0556    139 146*   K 4.1 4.3 4.3    103 111*   CO2 24 25 25   BUN 42* 52* 42*   CREATININE 5.5* 6.5* 5.8*   CALCIUM 7.3* 7.6* 7.8*   PROT 4.8* 5.1* 5.0*   LABALBU 2.5* 2.6* 2.5*   BILITOT 0.2 0.2 0.2   ALKPHOS 79 80 95   AST 11* 12* 10*   ALT 10 12 11       Lipids:   Lab Results   Component Value Date/Time    CHOL 145 05/13/2021 10:28 AM    CHOL 168 10/15/2013 10:30 AM    HDL 29 05/13/2021 10:28 AM    TRIG 188 05/13/2021 10:28 AM     Glucose:  Recent Labs     07/29/22  2226 07/30/22  0216 07/30/22  0912   POCGLU 230* 266* 184*       [id number]:  Lab Results   Component Value Date/Time    LABA1C 10.0 07/25/2022 06:50 AM     High Sensitivity TSH:   Lab Results   Component Value Date/Time    TSHHS 3.860 07/25/2022 06:50 AM     Free T3: No results found for: FT3  Free T4:  Lab Results   Component Value Date/Time    T4FREE 1.25 07/24/2017 11:10 AM       NM MYOCARDIAL SPECT REST EXERCISE OR RX   Final Result      IR TUNNELED CVC PLACE WO SQ PORT/PUMP > 5 YEARS   Final Result   Successful ultrasound and fluoroscopy guided Port-A-Cath/tunneled dialysis   access catheter placement via right internal jugular venous approach. XR CHEST PORTABLE   Final Result   Temper dialysis access catheter via right lower cervical approach with tip in   the mid right atrium. No complication suggested. Underlying findings consistent with congestive heart failure/hypervolemia s/p   or low lung volumes without detectable pleural effusion. US RETROPERITONEAL LIMITED   Final Result   Unremarkable ultrasound of the kidneys. CT ABDOMEN PELVIS WO CONTRAST Additional Contrast? None   Final Result   No acute process in the abdomen or pelvis. CT CERVICAL SPINE WO CONTRAST   Final Result   No acute abnormality of the cervical spine. CT HEAD WO CONTRAST   Final Result   No acute intracranial abnormality. XR CHEST PORTABLE   Final Result   No acute cardiopulmonary abnormality.          IR NONTUNNELED VASCULAR CATHETER > 5 YEARS    (Results Pending)        Scheduled Medicines   Medications:    insulin glargine  40 Units SubCUTAneous Nightly    clopidogrel  75 mg Oral Daily    aspirin  81 mg Oral Daily    alogliptin  6.25 mg Oral Daily    insulin lispro  20 Units SubCUTAneous TID WC    insulin lispro  0-8 Units SubCUTAneous TID WC    insulin lispro  0-8 Units SubCUTAneous 2 times per day    sucralfate  1 g Oral BID AC    atorvastatin  40 mg Oral Nightly    sertraline  100 mg Oral Daily    sodium chloride flush  5-40 mL IntraVENous 2 times per day    calcitRIOL  0.25 mcg Oral Daily    epoetin paola-epbx  10,000 Units IntraVENous Once per day on Mon Wed Fri    pantoprazole  40 mg IntraVENous BID      Infusions:    dextrose      sodium chloride 20 mL/hr at 07/26/22 0802         Objective:   Vitals: BP (!) 147/76   Pulse 85   Temp 98 °F (36.7 °C) (Oral)   Resp 15   Ht 5' 9\" (1.753 m)   Wt (!) 306 lb (138.8 kg)   SpO2 96%   BMI 45.19 kg/m²   General appearance: alert and cooperative with exam  Neck: no JVD or bruit  Thyroid : Normal lobes   Lungs: Has Vesicular Breath sounds   Heart:  regular rate and rhythm  Abdomen: soft, non-tender; bowel sounds normal; no masses,  no organomegaly  Musculoskeletal: Normal  Extremities: extremities normal, , bilateral leg edema  Neurologic:  Awake, alert, oriented to name, place and time. Cranial nerves II-XII are grossly intact. Motor is  intact.   Sensory neuropathy,  and gait is normal.    Assessment:     Patient Active Problem List:     Hiatal hernia     Diabetes mellitus type 2, improved controlled     Diabetic neuropathy (HCC)     Panic attack     Anxiety associated with depression     Neck pain     DANIELA (obstructive sleep apnea)     Urinary frequency     Bilateral carpal tunnel syndrome- left worse than right     Hyperlipidemia with target LDL less than 100     Essential hypertension     Bronchitis     Osteomyelitis (HCC)     Abscess     Periumbilical hernia     Sepsis (HCC)     Cellulitis of left foot     Constipation     Intractable nausea and vomiting     Uncontrolled type 2 diabetes mellitus (HCC)     Nausea and vomiting     Diabetic foot infection ([address]     Acute renal failure (ARF) (HCC)     Cellulitis     Cellulitis of left arm     Cellulitis, scrotum     Acute epididymitis     Irene gangrene     Recurrent major depressive disorder, in full remission ([address]     Generalized anxiety disorder     Morbid obesity with body mass index (BMI) of 40.0 to 44.9 in adult [facility])     Necrotizing fasciitis ([address]     Syncope     Right groin wound     Ulcer of right groin with fat layer exposed ([address]     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 type 2 diabetes mellitus, with muscle involvement without evidence of necrosis (HCC)     Nephrotic syndrome     Generalized edema     Stage 3b chronic kidney disease ([address]     Diabetic nephropathy associated with type 2 diabetes mellitus (HCC)     Hypoalbuminemia     Chronic kidney disease, stage IV (severe) (HCC)     FH: CAD (coronary artery disease)     ASCVD (arteriosclerotic cardiovascular disease)     Other proteinuria     Chest pain     Surgical wound dehiscence     CARMEN (acute kidney injury) ([facility]     Anemia      Plan:     Reviewed POC blood glucose . Labs and X ray results   Reviewed Current Medicines   On meal/ Correction bolus Humalog/ Basal Lantus Insulin regime  Monitor Blood glucose frequently   Modified  the dose of Insulin/ other medicines as needed   Now patient is on scheduled hemodialysis   Will follow     .      Pepe Doll MD, MD

## 2022-07-30 NOTE — PROGRESS NOTES
103 103 111*   CO2 24 25 25   BUN 42* 52* 42*   CREATININE 5.5* 6.5* 5.8*   GLUCOSE 140* 156* 201*     Hepatic:   Recent Labs     07/28/22  0645 07/29/22  0600 07/30/22  0556   AST 11* 12* 10*   ALT 10 12 11   BILITOT 0.2 0.2 0.2   ALKPHOS 79 80 95     Troponin: No results for input(s): TROPONINI in the last 72 hours. BNP: No results for input(s): BNP in the last 72 hours. Lipids: No results for input(s): CHOL, HDL in the last 72 hours. Invalid input(s): LDLCALCU  ABGs:   Lab Results   Component Value Date/Time    PO2ART 119 06/18/2013 01:15 PM    TFM7LGE 47.0 06/18/2013 01:15 PM     INR:   No results for input(s): INR in the last 72 hours.       Objective:   Vitals: BP (!) 147/76   Pulse 85   Temp 98 °F (36.7 °C) (Oral)   Resp 15   Ht 5' 9\" (1.753 m)   Wt (!) 306 lb (138.8 kg)   SpO2 96%   BMI 45.19 kg/m²   General appearance: alert and cooperative with exam, in no acute distress  HEENT: normocephalic, atraumatic,   Neck: supple, trachea midline  Lungs: breathing comfortably on room air  Extremities: extremities atraumatic, no cyanosis or edema  Neurologic: alert, oriented, follows commands, interactive    Assessment and Plan:     Patient Active Problem List    Diagnosis Date Noted    CARMEN (acute kidney injury) ([facility] 07/25/2022    Anemia 07/25/2022    Recurrent major depressive disorder, in full remission ([facility] 05/18/2021    Generalized anxiety disorder 05/18/2021    Surgical wound dehiscence 02/08/2022    Chest pain 12/21/2021    Other proteinuria     FH: CAD (coronary artery disease) 09/29/2021    ASCVD (arteriosclerotic cardiovascular disease) 09/29/2021    Chronic kidney disease, stage IV (severe) ([facility] 09/28/2021    Nephrotic syndrome 09/22/2021    Generalized edema 09/22/2021    Stage 3b chronic kidney disease ([facility] 09/22/2021    Diabetic nephropathy associated with type 2 diabetes mellitus ([facility] 09/22/2021    Hypoalbuminemia 09/22/2021    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 type 2 diabetes mellitus, with muscle involvement without evidence of necrosis ([address] 09/21/2021    Right groin wound 06/09/2021    Ulcer of right groin with fat layer exposed ([address]     Syncope 06/05/2021    Necrotizing fasciitis ([address] 05/24/2021    Morbid obesity with body mass index (BMI) of 40.0 to 44.9 in adult [facility]) 05/21/2021    Irene gangrene     Cellulitis, scrotum 05/13/2021    Acute epididymitis     Cellulitis of left arm 04/03/2021    Cellulitis 03/23/2021    Acute renal failure (ARF) ([address] 08/04/2019    Diabetic foot infection ([address] 03/21/2019    Intractable nausea and vomiting 01/11/2019    Uncontrolled type 2 diabetes mellitus ([address] 01/11/2019    Nausea and vomiting 01/11/2019    Constipation 10/07/2018    Cellulitis of left foot     Sepsis ([address] 22/93/0365    Periumbilical hernia     Abscess 12/03/2017    Osteomyelitis ([address] 05/22/2015    Bronchitis 10/15/2013    Hyperlipidemia with target LDL less than 100 07/15/2013    Essential hypertension 07/15/2013    Bilateral carpal tunnel syndrome- left worse than right 06/24/2013    DANIELA (obstructive sleep apnea) 06/20/2013    Urinary frequency 06/20/2013    Neck pain 05/16/2013    Anxiety associated with depression     Panic attack 02/01/2012    Diabetic neuropathy ([address] 12/22/2011    Hiatal hernia 02/04/2011    Diabetes mellitus type 2, improved controlled 02/04/2011   MDM  -Tunnelled HD cath in place  -Will keep him as CARMEN for now and monitor for renal recovery  -Continue calcitriol as outpt   -Next HD Monday                    Electronically signed by Jasmine Owens MD on 7/30/2022 at 8:32 AM    [address] Terri Bradley DO [address]  [place], [place] [address]  PHONE: [phone]  FAX: [phone]

## 2022-07-30 NOTE — CARE COORDINATION
Received referral for ARU. Reviewed patients clinicals and PT/OT notes. PT/OT rec' SNF. ARU willing to follow for potential progress with therapy. If patient progresses to being ARU appropriate at pre-cert will need to be initiated with [facility]. ARU will continue to follow.

## 2022-07-30 NOTE — PROGRESS NOTES
Progress Note( Dr. Verna Molina)  7/29/2022  Subjective:   Admit Date: 7/24/2022  PCP: Marcelo Hidalgo MD    Admitted For :Generalized weakness and syncope       Consulted For:  Better control of blood glucose    Interval History: Had EGD done this morning shows  ) Sandra-Roberson tear , no active bleed at this point                2) normal stomach, normal esophagus                3) normal duodenum  Patient went for stress test this morning    Denies any chest pains,   Denies SOB . Denies nausea or vomiting. No new bowel or bladder symptoms.        Intake/Output Summary (Last 24 hours) at 7/29/2022 2040  Last data filed at 7/29/2022 1255  Gross per 24 hour   Intake 500 ml   Output 3475 ml   Net -2975 ml         DATA    CBC:   Recent Labs     07/27/22  0530 07/28/22  0645 07/29/22  0600   WBC 7.6 6.6 7.3   HGB 9.0* 9.1* 8.2*    203 223      CMP:  Recent Labs     07/27/22  0530 07/28/22  0645 07/29/22  0600    139 139   K 4.3 4.1 4.3    103 103   CO2 25 24 25   BUN 38* 42* 52*   CREATININE 4.7* 5.5* 6.5*   CALCIUM 7.8* 7.3* 7.6*   PROT 4.7* 4.8* 5.1*   LABALBU 2.5* 2.5* 2.6*   BILITOT 0.1 0.2 0.2   ALKPHOS 82 79 80   AST 9* 11* 12*   ALT 8* 10 12       Lipids:   Lab Results   Component Value Date/Time    CHOL 145 05/13/2021 10:28 AM    CHOL 168 10/15/2013 10:30 AM    HDL 29 05/13/2021 10:28 AM    TRIG 188 05/13/2021 10:28 AM     Glucose:  Recent Labs     07/29/22  0350 07/29/22  0617 07/29/22  1335   POCGLU 176* 154* 147*       [id number]:  Lab Results   Component Value Date/Time    LABA1C 10.0 07/25/2022 06:50 AM     High Sensitivity TSH:   Lab Results   Component Value Date/Time    TSHHS 3.860 07/25/2022 06:50 AM     Free T3: No results found for: FT3  Free T4:  Lab Results   Component Value Date/Time    T4FREE 1.25 07/24/2017 11:10 AM       NM MYOCARDIAL SPECT REST EXERCISE OR RX   Final Result      IR TUNNELED CVC PLACE WO SQ PORT/PUMP > 5 YEARS   Final Result   Successful ultrasound and fluoroscopy guided Port-A-Cath/tunneled dialysis   access catheter placement via right internal jugular venous approach. XR CHEST PORTABLE   Final Result   Temper dialysis access catheter via right lower cervical approach with tip in   the mid right atrium. No complication suggested. Underlying findings consistent with congestive heart failure/hypervolemia s/p   or low lung volumes without detectable pleural effusion. US RETROPERITONEAL LIMITED   Final Result   Unremarkable ultrasound of the kidneys. CT ABDOMEN PELVIS WO CONTRAST Additional Contrast? None   Final Result   No acute process in the abdomen or pelvis. CT CERVICAL SPINE WO CONTRAST   Final Result   No acute abnormality of the cervical spine. CT HEAD WO CONTRAST   Final Result   No acute intracranial abnormality. XR CHEST PORTABLE   Final Result   No acute cardiopulmonary abnormality.          IR NONTUNNELED VASCULAR CATHETER > 5 YEARS    (Results Pending)        Scheduled Medicines   Medications:    clopidogrel  75 mg Oral Daily    aspirin  81 mg Oral Daily    alogliptin  6.25 mg Oral Daily    insulin lispro  20 Units SubCUTAneous TID WC    insulin glargine  35 Units SubCUTAneous Nightly    insulin lispro  0-8 Units SubCUTAneous TID WC    insulin lispro  0-8 Units SubCUTAneous 2 times per day    sucralfate  1 g Oral BID AC    atorvastatin  40 mg Oral Nightly    sertraline  100 mg Oral Daily    sodium chloride flush  5-40 mL IntraVENous 2 times per day    calcitRIOL  0.25 mcg Oral Daily    epoetin paola-epbx  10,000 Units IntraVENous Once per day on Mon Wed Fri    pantoprazole  40 mg IntraVENous BID      Infusions:    dextrose      sodium chloride 20 mL/hr at 07/26/22 0802         Objective:   Vitals: BP (!) 146/96   Pulse 81   Temp 98 °F (36.7 °C)   Resp 13   Ht 5' 9\" (1.753 m)   Wt (!) 309 lb 8.4 oz (140.4 kg)   SpO2 96%   BMI 45.71 kg/m²   General appearance: alert and cooperative with exam  Neck: no JVD or bruit  Thyroid : Normal lobes   Lungs: Has Vesicular Breath sounds   Heart:  regular rate and rhythm  Abdomen: soft, non-tender; bowel sounds normal; no masses,  no organomegaly  Musculoskeletal: Normal  Extremities: extremities normal, , bilateral leg edema  Neurologic:  Awake, alert, oriented to name, place and time. Cranial nerves II-XII are grossly intact. Motor is  intact.   Sensory neuropathy,  and gait is normal.    Assessment:     Patient Active Problem List:     Hiatal hernia     Diabetes mellitus type 2, improved controlled     Diabetic neuropathy (HCC)     Panic attack     Anxiety associated with depression     Neck pain     DANIELA (obstructive sleep apnea)     Urinary frequency     Bilateral carpal tunnel syndrome- left worse than right     Hyperlipidemia with target LDL less than 100     Essential hypertension     Bronchitis     Osteomyelitis (HCC)     Abscess     Periumbilical hernia     Sepsis (HCC)     Cellulitis of left foot     Constipation     Intractable nausea and vomiting     Uncontrolled type 2 diabetes mellitus (HCC)     Nausea and vomiting     Diabetic foot infection ([address]     Acute renal failure (ARF) (HCC)     Cellulitis     Cellulitis of left arm     Cellulitis, scrotum     Acute epididymitis     Irene gangrene     Recurrent major depressive disorder, in full remission ([address]     Generalized anxiety disorder     Morbid obesity with body mass index (BMI) of 40.0 to 44.9 in [facility])     Necrotizing fasciitis ([address]     Syncope     Right groin wound     Ulcer of right groin with fat layer exposed ([address]     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 type 2 diabetes mellitus, with muscle involvement without evidence of necrosis (HCC)     Nephrotic syndrome     Generalized edema     Stage 3b chronic kidney disease ([address]     Diabetic nephropathy associated with type 2 diabetes mellitus (HCC)     Hypoalbuminemia     Chronic kidney disease, stage IV (severe) [facility])     FH: CAD (coronary artery disease)     ASCVD (arteriosclerotic cardiovascular disease)     Other proteinuria     Chest pain     Surgical wound dehiscence     CARMEN (acute kidney injury) ([facility]     Anemia      Plan:     Reviewed POC blood glucose . Labs and X ray results   Reviewed Current Medicines   On meal/ Correction bolus Humalog/ Basal Lantus Insulin regime  Monitor Blood glucose frequently   Modified  the dose of Insulin/ other medicines as needed   Now patient is on scheduled hemodialysis   Will follow     .      Vito Machuca MD, MD

## 2022-07-30 NOTE — PROGRESS NOTES
V2.0  [facility] Hospitalist Progress Note      Name:  Vernette Opitz /Age/Sex: 1975  (52 y.o. male)   MRN & CSN:  [id number] & [id number] Encounter Date/Time: 2022 10:46 AM EDT    Location:  [id number] PCP: Kevin Randle MD       Hospital Day: 7    Assessment and Plan:   Vernette Opitz is a 52 y.o. 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 *Acute on chronic anemia secondary to GI bleed  EGD 2022: Sandra-Roberson tear, no active bleed  Chronic normocytic anemia due to underlying chronic kidney disease  Started on EPO   Resumed on  Plavix as the GI signed off , was Contacted primary care physician Dr. Flor Whitaker reports that the patient was last seen in office in 2021 and was not on Eliquis. discontinue Eliquis   , resumed on  aspirin and Plavix to complete 12 months of DAPT post PCI.   Continue IV Protonix twice daily, switched to oral on d/c   GI signed out   Monitor CBC and transfuse if hemoglobin less than 7  Hb stable at 8.8    *End-stage renal disease on hemodialysis  -started HD 2022  Nephrology following, appreciate recommendations  Monitor BMP  Tunneled HD catheter before discharge, consulted interventional radiology  Case management working on outpatient HD set up pending hepatitis panel    *Type 2 diabetes with hyperglycemia  Basal insulin in addition to low-dose insulin sliding scale  Target blood glucose level 140-180  HbA1c 10%  Endocrinology following    *Intractable nausea/vomiting  -Improving  As needed antinausea medications  Tolerating diet well    *Hyperkalemia  -Resolved after hemodialysis    Chronic conditions include:  *Coronary artery disease status post PCI in LAD (9/2021)  -Patient is on Plavix and statin outpatient  -Resume aspirin and Plavix after cleared by GI  *Hypertension:Hold antihypertensives in the setting of newly initiated hemodialysis  *Hyperlipidemia: Continue statin  *History of Irene's gangrene  *History of toe amputations    Diet ADULT DIET; Regular; 4 carb choices (60 gm/meal); Low Potassium (Less than 3000 mg/day); 2000 ml  ADULT ORAL NUTRITION SUPPLEMENT; Lunch; Renal Oral Supplement   DVT Prophylaxis [] Lovenox, []  Heparin, [x] SCDs, [] Ambulation,  [] Eliquis, [] Xarelto  [] Coumadin   Code Status Full Code   Disposition From: Home  Expected Disposition: Home  Estimated Date of Discharge:   Patient requires continued admission due to tunneled HD catheter placement, outpatient HD set up , still feels dizzy    Surrogate Decision Maker/ POA Wife     Subjective:     Chief Complaint: Loss of Consciousness (Did not hit head, no blood thinners)       Yves Stevenson is a 52 y.o. male who presents with a syncopal episode. Patient was seen and evaluated at during hemodialysis. Patient reports mild nausea but states otherwise that his nausea/vomiting episodes have improved significantly since admission. Patient seems anxious about outcomes after this hospitalization. Detailed discussion regarding treatment plan which includes placement of a tunneled HD catheter and outpatient set up of hemodialysis. Patient apparently was supposed to be on Eliquis and Plavix but reports that he only takes Plavix. Upon chart review unsure about the reasoning of Eliquis.        The patient was seen and examined at bedside  Denies CP or SOB  Tunneled HD catheter placement by IR 7/27 , HD on Friday 7/29  Patient feels weak and interested in acute rehab placement  Consult pt/ot   Still complain of dizziness and lightheadedness with standing up  Check orthostatic BP  Is positive will star patient on midodrine   If negative then will do MRI of brain   Patient not interested in acute rehab any more         Objective: Intake/Output Summary (Last 24 hours) at 7/30/2022 1023  Last data filed at 7/29/2022 2300  Gross per 24 hour   Intake 740 ml   Output 3000 ml   Net -2260 ml          Vitals:   Vitals:    07/30/22 0453   BP: (!) 147/76   Pulse: 85   Resp: 15   Temp: 98 °F (36.7 °C)   SpO2: 96%       Physical Exam:   Physical Exam  Constitutional:       General: He is not in acute distress. Appearance: He is not ill-appearing, toxic-appearing or diaphoretic. HENT:      Head: Normocephalic and atraumatic. Nose: Nose normal.      Mouth/Throat:      Mouth: Mucous membranes are dry. Pharynx: Oropharynx is clear. Eyes:      Conjunctiva/sclera: Conjunctivae normal.      Pupils: Pupils are equal, round, and reactive to light. Neck:      Comments: HD catheter in place undergoing hemodialysis  Cardiovascular:      Rate and Rhythm: Normal rate and regular rhythm. Pulses: Normal pulses. Heart sounds: Normal heart sounds. No murmur heard. Pulmonary:      Effort: Pulmonary effort is normal.      Breath sounds: Normal breath sounds. Abdominal:      Tenderness: There is abdominal tenderness (Improved from admission). There is no guarding or rebound. Musculoskeletal:      Cervical back: Normal range of motion and neck supple. Right lower leg: No edema. Left lower leg: No edema. Comments: Multiple toe amputations   Skin:     General: Skin is warm and dry. Neurological:      General: No focal deficit present. Mental Status: He is oriented to person, place, and time.    Psychiatric:      Comments: Anxious Medications:   Medications:    insulin glargine  40 Units SubCUTAneous Nightly    clopidogrel  75 mg Oral Daily    aspirin  81 mg Oral Daily    alogliptin  6.25 mg Oral Daily    insulin lispro  20 Units SubCUTAneous TID WC    insulin lispro  0-8 Units SubCUTAneous TID WC    insulin lispro  0-8 Units SubCUTAneous 2 times per day    sucralfate  1 g Oral BID AC    atorvastatin  40 mg Oral Nightly    sertraline  100 mg Oral Daily    sodium chloride flush  5-40 mL IntraVENous 2 times per day    calcitRIOL  0.25 mcg Oral Daily    epoetin paola-epbx  10,000 Units IntraVENous Once per day on Mon Wed Fri    pantoprazole  40 mg IntraVENous BID      Infusions:    dextrose      sodium chloride 20 mL/hr at 07/26/22 0802     PRN Meds: oxyCODONE, 5 mg, Q8H PRN  glucose, 4 tablet, PRN  dextrose bolus, 125 mL, PRN   Or  dextrose bolus, 250 mL, PRN  glucagon (rDNA), 1 mg, PRN  dextrose, , Continuous PRN  sodium chloride flush, 5-40 mL, PRN  sodium chloride, , PRN  ondansetron, 4 mg, Q8H PRN   Or  ondansetron, 4 mg, Q6H PRN  polyethylene glycol, 17 g, Daily PRN  acetaminophen, 650 mg, Q6H PRN   Or  acetaminophen, 650 mg, Q6H PRN      Labs      Recent Results (from the past 24 hour(s))   POCT Glucose    Collection Time: 07/29/22  1:35 PM   Result Value Ref Range    POC Glucose 147 (H) 70 - 99 MG/DL   POCT Glucose    Collection Time: 07/29/22 10:26 PM   Result Value Ref Range    POC Glucose 230 (H) 70 - 99 MG/DL   POCT Glucose    Collection Time: 07/30/22  2:16 AM   Result Value Ref Range    POC Glucose 266 (H) 70 - 99 MG/DL   CBC    Collection Time: 07/30/22  5:56 AM   Result Value Ref Range    WBC 8.0 4.0 - 10.5 K/CU MM    RBC 2.96 (L) 4.6 - 6.2 M/CU MM    Hemoglobin 8.8 (L) 13.5 - 18.0 GM/DL    Hematocrit 26.9 (L) 42 - 52 %    MCV 90.9 78 - 100 FL    MCH 29.7 27 - 31 PG    MCHC 32.7 32.0 - 36.0 %    RDW 13.0 11.7 - 14.9 %    Platelets 076 577 - 885 K/CU MM    MPV 10.1 7.5 - 11.1 FL   Comprehensive Metabolic Panel    Collection collection, lymphadenopathy. Bones/Soft Tissues: No acute bone or soft tissue abnormality. No acute process in the abdomen or pelvis. CT HEAD WO CONTRAST    Result Date: 7/24/2022  EXAMINATION: CT OF THE HEAD WITHOUT CONTRAST  7/24/2022 9:57 pm TECHNIQUE: CT of the head was performed without the administration of intravenous contrast. Automated exposure control, iterative reconstruction, and/or weight based adjustment of the mA/kV was utilized to reduce the radiation dose to as low as reasonably achievable. COMPARISON: 11/25/2019 HISTORY: ORDERING SYSTEM PROVIDED HISTORY: syncope TECHNOLOGIST PROVIDED HISTORY: Has a \"code stroke\" or \"stroke alert\" been called? ->No Reason for exam:->syncope Decision Support Exception - unselect if not a suspected or confirmed emergency medical condition->Emergency Medical Condition (MA) Reason for Exam: syncope FINDINGS: BRAIN/VENTRICLES: There is no acute intracranial hemorrhage, mass effect or midline shift. No abnormal extra-axial fluid collection. The gray-white differentiation is maintained without evidence of an acute infarct. There is no evidence of hydrocephalus. ORBITS: The visualized portion of the orbits demonstrate no acute abnormality. SINUSES: The visualized paranasal sinuses and mastoid air cells demonstrate no acute abnormality. SOFT TISSUES/SKULL:  No acute abnormality of the visualized skull or soft tissues. No acute intracranial abnormality. CT CERVICAL SPINE WO CONTRAST    Result Date: 7/24/2022  EXAMINATION: CT OF THE CERVICAL SPINE WITHOUT CONTRAST 7/24/2022 9:58 pm TECHNIQUE: CT of the cervical spine was performed without the administration of intravenous contrast. Multiplanar reformatted images are provided for review. Automated exposure control, iterative reconstruction, and/or weight based adjustment of the mA/kV was utilized to reduce the radiation dose to as low as reasonably achievable.  COMPARISON: 07/09/2018 HISTORY: ORDERING SYSTEM PROVIDED HISTORY: fall TECHNOLOGIST PROVIDED HISTORY: Reason for exam:->fall Decision Support Exception - unselect if not a suspected or confirmed emergency medical condition->Emergency Medical Condition (MA) Reason for Exam: fall FINDINGS: BONES/ALIGNMENT: There is no acute fracture or traumatic malalignment. DEGENERATIVE CHANGES: No significant degenerative changes. SOFT TISSUES: There is no prevertebral soft tissue swelling. No acute abnormality of the cervical spine. XR CHEST PORTABLE    Result Date: 7/24/2022  EXAMINATION: ONE XRAY VIEW OF THE CHEST 7/24/2022 8:31 pm COMPARISON: 09/30/2021 HISTORY: ORDERING SYSTEM PROVIDED HISTORY: Chest pain TECHNOLOGIST PROVIDED HISTORY: Reason for exam:->Chest pain Reason for Exam: loss of consciousness Additional signs and symptoms: chest pain FINDINGS: Cardiomediastinal silhouette is unchanged in size. There is no pleural effusion or pneumothorax. There is no pulmonary consolidation. There is no acute osseous abnormality. No acute cardiopulmonary abnormality. US RETROPERITONEAL LIMITED    Result Date: 7/25/2022  EXAMINATION: ULTRASOUND OF THE KIDNEYS 7/24/2022 11:18 pm COMPARISON: None. HISTORY: ORDERING SYSTEM PROVIDED HISTORY: García TECHNOLOGIST PROVIDED HISTORY: Reason for exam:->García Reason for Exam: garcía FINDINGS: The right kidney measures 12.8 cm in length and the left kidney measures 13.2 cm in length. Kidneys demonstrate normal cortical echogenicity. No hydronephrosis or intrarenal stones. No focal lesions. Unremarkable ultrasound of the kidneys.        Electronically signed by Khoi Perez MD on 7/30/2022 at 10:23 AM

## 2022-07-30 NOTE — PLAN OF CARE
Problem: Discharge Planning  Goal: Discharge to home or other facility with appropriate resources  Outcome: Progressing  Flowsheets (Taken 7/29/2022 1602 by Brody Smith, RN)  Discharge to home or other facility with appropriate resources:   Identify barriers to discharge with patient and caregiver   Arrange for needed discharge resources and transportation as appropriate   Identify discharge learning needs (meds, wound care, etc)   Refer to discharge planning if patient needs post-hospital services based on physician order or complex needs related to functional status, cognitive ability or social support system     Problem: Pain  Goal: Verbalizes/displays adequate comfort level or baseline comfort level  Outcome: Progressing     Problem: Safety - Adult  Goal: Free from fall injury  Outcome: Progressing     Problem: Chronic Conditions and Co-morbidities  Goal: Patient's chronic conditions and co-morbidity symptoms are monitored and maintained or improved  Outcome: Progressing  Flowsheets (Taken 7/29/2022 1602 by Brody Smith, RN)  Care Plan - Patient's Chronic Conditions and Co-Morbidity Symptoms are Monitored and Maintained or Improved:   Monitor and assess patient's chronic conditions and comorbid symptoms for stability, deterioration, or improvement   Collaborate with multidisciplinary team to address chronic and comorbid conditions and prevent exacerbation or deterioration   Update acute care plan with appropriate goals if chronic or comorbid symptoms are exacerbated and prevent overall improvement and discharge     Problem: Nutrition Deficit:  Goal: Optimize nutritional status  7/29/2022 2234 by Dania Cushing, RN  Outcome: Progressing  7/29/2022 1454 by Pamela Ruiz RD, LD  Outcome: Not Progressing  Flowsheets (Taken 7/29/2022 1454)  Nutrient intake appropriate for improving, restoring, or maintaining nutritional needs:   Assess nutritional status and recommend course of action   Monitor oral intake, labs, and treatment plans   Recommend appropriate diets, oral nutritional supplements, and vitamin/mineral supplements     Problem: Nutrition Deficit:  Goal: Optimize nutritional status  7/29/2022 2234 by Mayuri Jenkins RN  Outcome: Progressing  7/29/2022 1454 by Dennis Crowell RD, LD  Outcome: Not Progressing  Flowsheets (Taken 7/29/2022 1454)  Nutrient intake appropriate for improving, restoring, or maintaining nutritional needs:   Assess nutritional status and recommend course of action   Monitor oral intake, labs, and treatment plans   Recommend appropriate diets, oral nutritional supplements, and vitamin/mineral supplements

## 2022-07-31 LAB
ALBUMIN SERPL-MCNC: 2.6 GM/DL (ref 3.4–5)
ALP BLD-CCNC: 80 IU/L (ref 40–128)
ALT SERPL-CCNC: 12 U/L (ref 10–40)
ANION GAP SERPL CALCULATED.3IONS-SCNC: 12 MMOL/L (ref 4–16)
AST SERPL-CCNC: 12 IU/L (ref 15–37)
BILIRUB SERPL-MCNC: 0.2 MG/DL (ref 0–1)
BUN BLDV-MCNC: 49 MG/DL (ref 6–23)
CALCIUM SERPL-MCNC: 7.6 MG/DL (ref 8.3–10.6)
CHLORIDE BLD-SCNC: 106 MMOL/L (ref 99–110)
CO2: 23 MMOL/L (ref 21–32)
CREAT SERPL-MCNC: 6.1 MG/DL (ref 0.9–1.3)
GFR AFRICAN AMERICAN: 12 ML/MIN/1.73M2
GFR NON-AFRICAN AMERICAN: 10 ML/MIN/1.73M2
GLUCOSE BLD-MCNC: 155 MG/DL (ref 70–99)
GLUCOSE BLD-MCNC: 172 MG/DL (ref 70–99)
GLUCOSE BLD-MCNC: 174 MG/DL (ref 70–99)
GLUCOSE BLD-MCNC: 175 MG/DL (ref 70–99)
GLUCOSE BLD-MCNC: 177 MG/DL (ref 70–99)
GLUCOSE BLD-MCNC: 72 MG/DL (ref 70–99)
HCT VFR BLD CALC: 28.2 % (ref 42–52)
HEMOGLOBIN: 9.2 GM/DL (ref 13.5–18)
MCH RBC QN AUTO: 29.5 PG (ref 27–31)
MCHC RBC AUTO-ENTMCNC: 32.6 % (ref 32–36)
MCV RBC AUTO: 90.4 FL (ref 78–100)
PDW BLD-RTO: 13.1 % (ref 11.7–14.9)
PLATELET # BLD: 213 K/CU MM (ref 140–440)
PMV BLD AUTO: 10.3 FL (ref 7.5–11.1)
POTASSIUM SERPL-SCNC: 4.2 MMOL/L (ref 3.5–5.1)
RBC # BLD: 3.12 M/CU MM (ref 4.6–6.2)
SODIUM BLD-SCNC: 141 MMOL/L (ref 135–145)
TOTAL PROTEIN: 5.2 GM/DL (ref 6.4–8.2)
WBC # BLD: 8.8 K/CU MM (ref 4–10.5)

## 2022-07-31 PROCEDURE — 6370000000 HC RX 637 (ALT 250 FOR IP): Performed by: HOSPITALIST

## 2022-07-31 PROCEDURE — 6370000000 HC RX 637 (ALT 250 FOR IP): Performed by: INTERNAL MEDICINE

## 2022-07-31 PROCEDURE — 80053 COMPREHEN METABOLIC PANEL: CPT

## 2022-07-31 PROCEDURE — 94761 N-INVAS EAR/PLS OXIMETRY MLT: CPT

## 2022-07-31 PROCEDURE — 5A1D70Z PERFORMANCE OF URINARY FILTRATION, INTERMITTENT, LESS THAN 6 HOURS PER DAY: ICD-10-PCS | Performed by: INTERNAL MEDICINE

## 2022-07-31 PROCEDURE — 6370000000 HC RX 637 (ALT 250 FOR IP): Performed by: STUDENT IN AN ORGANIZED HEALTH CARE EDUCATION/TRAINING PROGRAM

## 2022-07-31 PROCEDURE — 36415 COLL VENOUS BLD VENIPUNCTURE: CPT

## 2022-07-31 PROCEDURE — 1200000000 HC SEMI PRIVATE

## 2022-07-31 PROCEDURE — 6370000000 HC RX 637 (ALT 250 FOR IP): Performed by: NURSE PRACTITIONER

## 2022-07-31 PROCEDURE — 82962 GLUCOSE BLOOD TEST: CPT

## 2022-07-31 PROCEDURE — 85027 COMPLETE CBC AUTOMATED: CPT

## 2022-07-31 RX ORDER — FLUDROCORTISONE ACETATE 0.1 MG/1
0.1 TABLET ORAL DAILY
Status: DISCONTINUED | OUTPATIENT
Start: 2022-07-31 | End: 2022-08-01

## 2022-07-31 RX ORDER — PANTOPRAZOLE SODIUM 40 MG/1
40 TABLET, DELAYED RELEASE ORAL ONCE
Status: COMPLETED | OUTPATIENT
Start: 2022-07-31 | End: 2022-07-31

## 2022-07-31 RX ORDER — PANTOPRAZOLE SODIUM 40 MG/1
40 TABLET, DELAYED RELEASE ORAL
Status: DISCONTINUED | OUTPATIENT
Start: 2022-08-01 | End: 2022-08-03 | Stop reason: HOSPADM

## 2022-07-31 RX ADMIN — INSULIN LISPRO 20 UNITS: 100 INJECTION, SOLUTION INTRAVENOUS; SUBCUTANEOUS at 09:45

## 2022-07-31 RX ADMIN — INSULIN GLARGINE 40 UNITS: 100 INJECTION, SOLUTION SUBCUTANEOUS at 22:23

## 2022-07-31 RX ADMIN — PANTOPRAZOLE SODIUM 40 MG: 40 TABLET, DELAYED RELEASE ORAL at 22:20

## 2022-07-31 RX ADMIN — SUCRALFATE 1 G: 1 TABLET ORAL at 17:00

## 2022-07-31 RX ADMIN — OXYCODONE HYDROCHLORIDE 5 MG: 5 TABLET ORAL at 22:20

## 2022-07-31 RX ADMIN — ALOGLIPTIN 6.25 MG: 12.5 TABLET, FILM COATED ORAL at 09:16

## 2022-07-31 RX ADMIN — INSULIN LISPRO 20 UNITS: 100 INJECTION, SOLUTION INTRAVENOUS; SUBCUTANEOUS at 13:25

## 2022-07-31 RX ADMIN — FLUDROCORTISONE ACETATE 0.1 MG: 0.1 TABLET ORAL at 13:25

## 2022-07-31 RX ADMIN — OXYCODONE HYDROCHLORIDE 5 MG: 5 TABLET ORAL at 09:20

## 2022-07-31 RX ADMIN — SERTRALINE HYDROCHLORIDE 100 MG: 50 TABLET ORAL at 09:15

## 2022-07-31 RX ADMIN — CALCITRIOL CAPSULES 0.25 MCG 0.25 MCG: 0.25 CAPSULE ORAL at 09:15

## 2022-07-31 RX ADMIN — CLOPIDOGREL BISULFATE 75 MG: 75 TABLET ORAL at 09:16

## 2022-07-31 RX ADMIN — ATORVASTATIN CALCIUM 40 MG: 40 TABLET, FILM COATED ORAL at 22:20

## 2022-07-31 RX ADMIN — ASPIRIN 81 MG CHEWABLE TABLET 81 MG: 81 TABLET CHEWABLE at 09:15

## 2022-07-31 ASSESSMENT — PAIN DESCRIPTION - DESCRIPTORS: DESCRIPTORS: ACHING;DISCOMFORT

## 2022-07-31 ASSESSMENT — PAIN DESCRIPTION - LOCATION
LOCATION: FOOT;NECK
LOCATION: NECK

## 2022-07-31 ASSESSMENT — PAIN DESCRIPTION - ORIENTATION
ORIENTATION: RIGHT
ORIENTATION: UPPER

## 2022-07-31 ASSESSMENT — PAIN SCALES - GENERAL
PAINLEVEL_OUTOF10: 6
PAINLEVEL_OUTOF10: 7

## 2022-07-31 NOTE — PROGRESS NOTES
V2.0  [facility] Hospitalist Progress Note      Name:  Armando Balbuena /Age/Sex: 1975  (52 y.o. male)   MRN & CSN:  [id number] & [id number] Encounter Date/Time: 2022 10:46 AM EDT    Location:  [address] [place] [address] PCP: Joan Araiza MD       Hospital Day: 8    Assessment and Plan:   Armando Balbuena is a 52 y.o. 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, hypertension, hyperlipidemia, type 2 diabetes and CKD stage IV with baseline around 2.2 who presents with Syncope    Assessment and Plan    *Syncope, seems recurrent     Likely secondary to orthostatic hypotension and metabolic derangements  CT head within normal limits  EKG within normal limits  Neurology following, appreciate recommendations  Fall precautions  Consult cardiology to r/o cardiac cause    cardiac stress test normal  Patient refused tilt table test  Could be 2/2 to orthostatic BP  Has positive orthostatic blood pressure  Started on fludrocortisone daily  Monitor orthostatic blood pressure    *Acute on chronic anemia secondary to GI bleed  EGD 2022: Sandra-Roberson tear, no active bleed  Chronic normocytic anemia due to underlying chronic kidney disease  Started on EPO   Resumed on  Plavix as the GI signed off , was Contacted primary care physician Dr. Oleg Zambrano reports that the patient was last seen in office in 2021 and was not on Eliquis. discontinue Eliquis   , resumed on  aspirin and Plavix to complete 12 months of DAPT post PCI.   Continue IV Protonix twice daily, switched to oral on d/c   GI signed out   Monitor CBC and transfuse if hemoglobin less than 7  Hb stable at 9.2    *End-stage renal disease on hemodialysis  -started HD 2022  Nephrology following, appreciate recommendations  Monitor BMP  Tunneled HD catheter before discharge, consulted interventional radiology  Case management working on outpatient HD set up, hepatitis panel negative for hepatitis B core antibody    *Type 2 diabetes with hyperglycemia  Basal insulin in addition to low-dose insulin sliding scale  Target blood glucose level 140-180  HbA1c 10%  Endocrinology following    *Intractable nausea/vomiting  -Improving  As needed antinausea medications  Tolerating diet still complain of nausea intermittently    *Hyperkalemia  -Resolved after hemodialysis    Chronic conditions include:  *Coronary artery disease status post PCI in LAD (9/2021)  -Patient is on Plavix and statin outpatient  -Resume aspirin and Plavix after cleared by GI  *Hypertension:Hold antihypertensives in the setting of newly initiated hemodialysis  *Hyperlipidemia: Continue statin  *History of Irene's gangrene  *History of toe amputations    Diet ADULT DIET; Regular; 4 carb choices (60 gm/meal); Low Potassium (Less than 3000 mg/day); 2000 ml  ADULT ORAL NUTRITION SUPPLEMENT; Lunch; Renal Oral Supplement   DVT Prophylaxis [] Lovenox, []  Heparin, [x] SCDs, [] Ambulation,  [] Eliquis, [] Xarelto  [] Coumadin   Code Status Full Code   Disposition From: Home  Expected Disposition: Home  Estimated Date of Discharge:   Patient requires continued admission due to tunneled HD catheter placement, outpatient HD set up , still feels dizzy    Surrogate Decision Maker/ POA Wife     Subjective:     Chief Complaint: Loss of Consciousness (Did not hit head, no blood thinners)       Holland Pratt is a 52 y.o. male who presents with a syncopal episode. Patient was seen and evaluated at during hemodialysis. Patient reports mild nausea but states otherwise that his nausea/vomiting episodes have improved significantly since admission. Patient seems anxious about outcomes after this hospitalization. Detailed discussion regarding treatment plan which includes placement of a tunneled HD catheter and outpatient set up of hemodialysis.   Patient apparently was supposed to be on Eliquis and Plavix but reports that he only takes Plavix. Upon chart review unsure about the reasoning of Eliquis. The patient was seen and examined at bedside  Denies CP or SOB  Tunneled HD catheter placement by IR 7/27 , HD on Friday 7/29  Still complain of dizziness and lightheadedness with standing up  Positive  orthostatic BP  MRI of the brain  nonacute  Start on fludrocortisone daily           Objective: Intake/Output Summary (Last 24 hours) at 7/31/2022 1053  Last data filed at 7/31/2022 0515  Gross per 24 hour   Intake 240 ml   Output 750 ml   Net -510 ml          Vitals:   Vitals:    07/31/22 0913   BP:    Pulse: 84   Resp:    Temp:    SpO2:        Physical Exam:   Physical Exam  Constitutional:       General: He is not in acute distress. Appearance: He is not ill-appearing, toxic-appearing or diaphoretic. HENT:      Head: Normocephalic and atraumatic. Nose: Nose normal.      Mouth/Throat:      Mouth: Mucous membranes are dry. Pharynx: Oropharynx is clear. Eyes:      Conjunctiva/sclera: Conjunctivae normal.      Pupils: Pupils are equal, round, and reactive to light. Neck:      Comments: HD catheter in place undergoing hemodialysis  Cardiovascular:      Rate and Rhythm: Normal rate and regular rhythm. Pulses: Normal pulses. Heart sounds: Normal heart sounds. No murmur heard. Pulmonary:      Effort: Pulmonary effort is normal.      Breath sounds: Normal breath sounds. Abdominal:      Tenderness: There is abdominal tenderness (Improved from admission). There is no guarding or rebound. Musculoskeletal:      Cervical back: Normal range of motion and neck supple. Right lower leg: No edema. Left lower leg: No edema. Comments: Multiple toe amputations   Skin:     General: Skin is warm and dry. Neurological:      General: No focal deficit present. Mental Status: He is oriented to person, place, and time.    Psychiatric:      Comments: Anxious          Medications:   Medications:    fludrocortisone 0.1 mg Oral Daily    insulin glargine  40 Units SubCUTAneous Nightly    clopidogrel  75 mg Oral Daily    aspirin  81 mg Oral Daily    alogliptin  6.25 mg Oral Daily    insulin lispro  20 Units SubCUTAneous TID WC    insulin lispro  0-8 Units SubCUTAneous TID WC    insulin lispro  0-8 Units SubCUTAneous 2 times per day    sucralfate  1 g Oral BID AC    atorvastatin  40 mg Oral Nightly    sertraline  100 mg Oral Daily    sodium chloride flush  5-40 mL IntraVENous 2 times per day    calcitRIOL  0.25 mcg Oral Daily    epoetin paola-epbx  10,000 Units IntraVENous Once per day on Mon Wed Fri    pantoprazole  40 mg IntraVENous BID      Infusions:    dextrose      sodium chloride 20 mL/hr at 07/26/22 0802     PRN Meds: oxyCODONE, 5 mg, Q8H PRN  glucose, 4 tablet, PRN  dextrose bolus, 125 mL, PRN   Or  dextrose bolus, 250 mL, PRN  glucagon (rDNA), 1 mg, PRN  dextrose, , Continuous PRN  sodium chloride flush, 5-40 mL, PRN  sodium chloride, , PRN  ondansetron, 4 mg, Q8H PRN   Or  ondansetron, 4 mg, Q6H PRN  polyethylene glycol, 17 g, Daily PRN  acetaminophen, 650 mg, Q6H PRN   Or  acetaminophen, 650 mg, Q6H PRN      Labs      Recent Results (from the past 24 hour(s))   POCT Glucose    Collection Time: 07/30/22 11:10 AM   Result Value Ref Range    POC Glucose 222 (H) 70 - 99 MG/DL   POCT Glucose    Collection Time: 07/30/22  5:30 PM   Result Value Ref Range    POC Glucose 131 (H) 70 - 99 MG/DL   POCT Glucose    Collection Time: 07/30/22  7:53 PM   Result Value Ref Range    POC Glucose 203 (H) 70 - 99 MG/DL   POCT Glucose    Collection Time: 07/30/22 10:24 PM   Result Value Ref Range    POC Glucose 165 (H) 70 - 99 MG/DL   CBC    Collection Time: 07/31/22  1:23 AM   Result Value Ref Range    WBC 8.8 4.0 - 10.5 K/CU MM    RBC 3.12 (L) 4.6 - 6.2 M/CU MM    Hemoglobin 9.2 (L) 13.5 - 18.0 GM/DL    Hematocrit 28.2 (L) 42 - 52 %    MCV 90.4 78 - 100 FL    MCH 29.5 27 - 31 PG    MCHC 32.6 32.0 - 36.0 %    RDW 13.1 11.7 - 14.9 % Platelets 813 719 - 669 K/CU MM    MPV 10.3 7.5 - 11.1 FL   Comprehensive Metabolic Panel    Collection Time: 07/31/22  1:23 AM   Result Value Ref Range    Sodium 141 135 - 145 MMOL/L    Potassium 4.2 3.5 - 5.1 MMOL/L    Chloride 106 99 - 110 mMol/L    CO2 23 21 - 32 MMOL/L    BUN 49 (H) 6 - 23 MG/DL    Creatinine 6.1 (H) 0.9 - 1.3 MG/DL    Glucose 172 (H) 70 - 99 MG/DL    Calcium 7.6 (L) 8.3 - 10.6 MG/DL    Albumin 2.6 (L) 3.4 - 5.0 GM/DL    Total Protein 5.2 (L) 6.4 - 8.2 GM/DL    Total Bilirubin 0.2 0.0 - 1.0 MG/DL    ALT 12 10 - 40 U/L    AST 12 (L) 15 - 37 IU/L    Alkaline Phosphatase 80 40 - 128 IU/L    GFR Non-African American 10 (L) >60 mL/min/1.73m2    GFR  12 (L) >60 mL/min/1.73m2    Anion Gap 12 4 - 16   POCT Glucose    Collection Time: 07/31/22  3:03 AM   Result Value Ref Range    POC Glucose 155 (H) 70 - 99 MG/DL   POCT Glucose    Collection Time: 07/31/22  7:04 AM   Result Value Ref Range    POC Glucose 174 (H) 70 - 99 MG/DL        Imaging/Diagnostics Last 24 Hours   CT ABDOMEN PELVIS WO CONTRAST Additional Contrast? None    Result Date: 7/24/2022  EXAMINATION: CT OF THE ABDOMEN AND PELVIS WITHOUT CONTRAST 7/24/2022 9:58 pm TECHNIQUE: CT of the abdomen and pelvis was performed without the administration of intravenous contrast. Multiplanar reformatted images are provided for review. Automated exposure control, iterative reconstruction, and/or weight based adjustment of the mA/kV was utilized to reduce the radiation dose to as low as reasonably achievable. COMPARISON: 08/11/2021 HISTORY: ORDERING SYSTEM PROVIDED HISTORY: pain TECHNOLOGIST PROVIDED HISTORY: Reason for exam:->pain Additional Contrast?->None Reason for Exam: pain FINDINGS: Lower Chest: Partially imaged coronary artery calcification. Organs: The liver is normal in contour and attenuation. Gallbladder is unremarkable. No biliary ductal dilatation. Pancreas, adrenals, kidneys, spleen are unremarkable.   Mild calcific atherosclerosis. GI/Bowel: Left colonic diverticulosis. Bowel is nondilated without wall thickening. Appendix is normal. Pelvis: Unremarkable. Peritoneum/Retroperitoneum: No free air, free fluid, organized fluid collection, lymphadenopathy. Bones/Soft Tissues: No acute bone or soft tissue abnormality. No acute process in the abdomen or pelvis. CT HEAD WO CONTRAST    Result Date: 7/24/2022  EXAMINATION: CT OF THE HEAD WITHOUT CONTRAST  7/24/2022 9:57 pm TECHNIQUE: CT of the head was performed without the administration of intravenous contrast. Automated exposure control, iterative reconstruction, and/or weight based adjustment of the mA/kV was utilized to reduce the radiation dose to as low as reasonably achievable. COMPARISON: 11/25/2019 HISTORY: ORDERING SYSTEM PROVIDED HISTORY: syncope TECHNOLOGIST PROVIDED HISTORY: Has a \"code stroke\" or \"stroke alert\" been called? ->No Reason for exam:->syncope Decision Support Exception - unselect if not a suspected or confirmed emergency medical condition->Emergency Medical Condition (MA) Reason for Exam: syncope FINDINGS: BRAIN/VENTRICLES: There is no acute intracranial hemorrhage, mass effect or midline shift. No abnormal extra-axial fluid collection. The gray-white differentiation is maintained without evidence of an acute infarct. There is no evidence of hydrocephalus. ORBITS: The visualized portion of the orbits demonstrate no acute abnormality. SINUSES: The visualized paranasal sinuses and mastoid air cells demonstrate no acute abnormality. SOFT TISSUES/SKULL:  No acute abnormality of the visualized skull or soft tissues. No acute intracranial abnormality. CT CERVICAL SPINE WO CONTRAST    Result Date: 7/24/2022  EXAMINATION: CT OF THE CERVICAL SPINE WITHOUT CONTRAST 7/24/2022 9:58 pm TECHNIQUE: CT of the cervical spine was performed without the administration of intravenous contrast. Multiplanar reformatted images are provided for review.  Automated exposure control, iterative reconstruction, and/or weight based adjustment of the mA/kV was utilized to reduce the radiation dose to as low as reasonably achievable. COMPARISON: 07/09/2018 HISTORY: ORDERING SYSTEM PROVIDED HISTORY: fall TECHNOLOGIST PROVIDED HISTORY: Reason for exam:->fall Decision Support Exception - unselect if not a suspected or confirmed emergency medical condition->Emergency Medical Condition (MA) Reason for Exam: fall FINDINGS: BONES/ALIGNMENT: There is no acute fracture or traumatic malalignment. DEGENERATIVE CHANGES: No significant degenerative changes. SOFT TISSUES: There is no prevertebral soft tissue swelling. No acute abnormality of the cervical spine. XR CHEST PORTABLE    Result Date: 7/24/2022  EXAMINATION: ONE XRAY VIEW OF THE CHEST 7/24/2022 8:31 pm COMPARISON: 09/30/2021 HISTORY: ORDERING SYSTEM PROVIDED HISTORY: Chest pain TECHNOLOGIST PROVIDED HISTORY: Reason for exam:->Chest pain Reason for Exam: loss of consciousness Additional signs and symptoms: chest pain FINDINGS: Cardiomediastinal silhouette is unchanged in size. There is no pleural effusion or pneumothorax. There is no pulmonary consolidation. There is no acute osseous abnormality. No acute cardiopulmonary abnormality. US RETROPERITONEAL LIMITED    Result Date: 7/25/2022  EXAMINATION: ULTRASOUND OF THE KIDNEYS 7/24/2022 11:18 pm COMPARISON: None. HISTORY: ORDERING SYSTEM PROVIDED HISTORY: García TECHNOLOGIST PROVIDED HISTORY: Reason for exam:->García Reason for Exam: garcía FINDINGS: The right kidney measures 12.8 cm in length and the left kidney measures 13.2 cm in length. Kidneys demonstrate normal cortical echogenicity. No hydronephrosis or intrarenal stones. No focal lesions. Unremarkable ultrasound of the kidneys.        Electronically signed by Joi Villar MD on 7/31/2022 at 10:53 AM

## 2022-07-31 NOTE — PROGRESS NOTES
[facility] [address], [address] [address]  Phone: [phone]  Office Hours: 8:30AM - 4:30PM  Monday - Friday    Nauseous this am  -170  May stop flurinef if BP remains high  Labs reviewed  Stable  Dialysis in am

## 2022-07-31 NOTE — PROGRESS NOTES
Progress Note( Dr. Pardo Senior)  7/31/2022  Subjective:   Admit Date: 7/24/2022  PCP: Colleen Flower MD    Admitted For :Generalized weakness and syncope       Consulted For:  Better control of blood glucose    Interval History: Had EGD done this morning shows  ) Sandra-Roberson tear , no active bleed at this point                2) normal stomach, normal esophagus                3) normal duodenum  Cardiac stress test negative for any CAD  Patient unable to do tilt test    Denies any chest pains,   Denies SOB . Denies nausea or vomiting. No new bowel or bladder symptoms. Intake/Output Summary (Last 24 hours) at 7/31/2022 1252  Last data filed at 7/31/2022 0515  Gross per 24 hour   Intake 240 ml   Output 750 ml   Net -510 ml         DATA    CBC:   Recent Labs     07/29/22  0600 07/30/22  0556 07/31/22  0123   WBC 7.3 8.0 8.8   HGB 8.2* 8.8* 9.2*    213 213      CMP:  Recent Labs     07/29/22  0600 07/30/22  0556 07/31/22  0123    146* 141   K 4.3 4.3 4.2    111* 106   CO2 25 25 23   BUN 52* 42* 49*   CREATININE 6.5* 5.8* 6.1*   CALCIUM 7.6* 7.8* 7.6*   PROT 5.1* 5.0* 5.2*   LABALBU 2.6* 2.5* 2.6*   BILITOT 0.2 0.2 0.2   ALKPHOS 80 95 80   AST 12* 10* 12*   ALT 12 11 12       Lipids:   Lab Results   Component Value Date/Time    CHOL 145 05/13/2021 10:28 AM    CHOL 168 10/15/2013 10:30 AM    HDL 29 05/13/2021 10:28 AM    TRIG 188 05/13/2021 10:28 AM     Glucose:  Recent Labs     07/31/22  0303 07/31/22  0704 07/31/22  1112   POCGLU 155* 174* 177*       [id number]:  Lab Results   Component Value Date/Time    LABA1C 10.0 07/25/2022 06:50 AM     High Sensitivity TSH:   Lab Results   Component Value Date/Time    TSHHS 3.860 07/25/2022 06:50 AM     Free T3: No results found for: FT3  Free T4:  Lab Results   Component Value Date/Time    T4FREE 1.25 07/24/2017 11:10 AM       MRI BRAIN WO CONTRAST   Final Result   1. No acute intracranial abnormality. 2. Trace bilateral mastoid effusions. NM MYOCARDIAL SPECT REST EXERCISE OR RX   Final Result      IR TUNNELED CVC PLACE WO SQ PORT/PUMP > 5 YEARS   Final Result   Successful ultrasound and fluoroscopy guided Port-A-Cath/tunneled dialysis   access catheter placement via right internal jugular venous approach. XR CHEST PORTABLE   Final Result   Temper dialysis access catheter via right lower cervical approach with tip in   the mid right atrium. No complication suggested. Underlying findings consistent with congestive heart failure/hypervolemia s/p   or low lung volumes without detectable pleural effusion. US RETROPERITONEAL LIMITED   Final Result   Unremarkable ultrasound of the kidneys. CT ABDOMEN PELVIS WO CONTRAST Additional Contrast? None   Final Result   No acute process in the abdomen or pelvis. CT CERVICAL SPINE WO CONTRAST   Final Result   No acute abnormality of the cervical spine. CT HEAD WO CONTRAST   Final Result   No acute intracranial abnormality. XR CHEST PORTABLE   Final Result   No acute cardiopulmonary abnormality.          IR NONTUNNELED VASCULAR CATHETER > 5 YEARS    (Results Pending)        Scheduled Medicines   Medications:    fludrocortisone  0.1 mg Oral Daily    insulin glargine  40 Units SubCUTAneous Nightly    clopidogrel  75 mg Oral Daily    aspirin  81 mg Oral Daily    alogliptin  6.25 mg Oral Daily    insulin lispro  20 Units SubCUTAneous TID WC    insulin lispro  0-8 Units SubCUTAneous TID WC    insulin lispro  0-8 Units SubCUTAneous 2 times per day    sucralfate  1 g Oral BID AC    atorvastatin  40 mg Oral Nightly    sertraline  100 mg Oral Daily    sodium chloride flush  5-40 mL IntraVENous 2 times per day    calcitRIOL  0.25 mcg Oral Daily    epoetin paola-epbx  10,000 Units IntraVENous Once per day on Mon Wed Fri    pantoprazole  40 mg IntraVENous BID      Infusions:    dextrose      sodium chloride 20 mL/hr at 07/26/22 0802         Objective:   Vitals: BP (!) 172/94 Pulse 84   Temp 98 °F (36.7 °C) (Oral)   Resp 18   Ht 5' 9\" (1.753 m)   Wt (!) 309 lb 8.4 oz (140.4 kg)   SpO2 98%   BMI 45.71 kg/m²   General appearance: alert and cooperative with exam  Neck: no JVD or bruit  Thyroid : Normal lobes   Lungs: Has Vesicular Breath sounds   Heart:  regular rate and rhythm  Abdomen: soft, non-tender; bowel sounds normal; no masses,  no organomegaly  Musculoskeletal: Normal  Extremities: extremities normal, , bilateral leg edema  Neurologic:  Awake, alert, oriented to name, place and time. Cranial nerves II-XII are grossly intact. Motor is  intact.   Sensory neuropathy,  and gait is normal.    Assessment:     Patient Active Problem List:     Hiatal hernia     Diabetes mellitus type 2, improved controlled     Diabetic neuropathy (HCC)     Panic attack     Anxiety associated with depression     Neck pain     DANIELA (obstructive sleep apnea)     Urinary frequency     Bilateral carpal tunnel syndrome- left worse than right     Hyperlipidemia with target LDL less than 100     Essential hypertension     Bronchitis     Osteomyelitis (HCC)     Abscess     Periumbilical hernia     Sepsis (HCC)     Cellulitis of left foot     Constipation     Intractable nausea and vomiting     Uncontrolled type 2 diabetes mellitus (HCC)     Nausea and vomiting     Diabetic foot infection ([address]     Acute renal failure (ARF) (HCC)     Cellulitis     Cellulitis of left arm     Cellulitis, scrotum     Acute epididymitis     Irene gangrene     Recurrent major depressive disorder, in full remission ([address]     Generalized anxiety disorder     Morbid obesity with body mass index (BMI) of 40.0 to 44.9 in adult [facility])     Necrotizing fasciitis ([address]     Syncope     Right groin wound     Ulcer of right groin with fat layer exposed ([address]     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 type 2 diabetes mellitus, with muscle involvement without evidence of necrosis (HCC)     Nephrotic syndrome Generalized edema     Stage 3b chronic kidney disease ([facility]     Diabetic nephropathy associated with type 2 diabetes mellitus (HCC)     Hypoalbuminemia     Chronic kidney disease, stage IV (severe) (HCC)     FH: CAD (coronary artery disease)     ASCVD (arteriosclerotic cardiovascular disease)     Other proteinuria     Chest pain     Surgical wound dehiscence     CARMEN (acute kidney injury) ([facility]     Anemia      Plan:     Reviewed POC blood glucose . Labs and X ray results   Reviewed Current Medicines   On meal/ Correction bolus Humalog/ Basal Lantus Insulin regime  Monitor Blood glucose frequently   Modified  the dose of Insulin/ other medicines as needed   Now patient is on scheduled hemodialysis   's postural hypotension, hospital started on Florinef  Will follow     .      Bianka Russell MD, MD

## 2022-08-01 LAB
ALBUMIN SERPL-MCNC: 2.8 GM/DL (ref 3.4–5)
ALP BLD-CCNC: 84 IU/L (ref 40–128)
ALT SERPL-CCNC: 11 U/L (ref 10–40)
ANION GAP SERPL CALCULATED.3IONS-SCNC: 12 MMOL/L (ref 4–16)
AST SERPL-CCNC: 11 IU/L (ref 15–37)
BILIRUB SERPL-MCNC: 0.2 MG/DL (ref 0–1)
BUN BLDV-MCNC: 53 MG/DL (ref 6–23)
CALCIUM SERPL-MCNC: 7.7 MG/DL (ref 8.3–10.6)
CHLORIDE BLD-SCNC: 108 MMOL/L (ref 99–110)
CO2: 24 MMOL/L (ref 21–32)
CREAT SERPL-MCNC: 6.5 MG/DL (ref 0.9–1.3)
GFR AFRICAN AMERICAN: 11 ML/MIN/1.73M2
GFR NON-AFRICAN AMERICAN: 9 ML/MIN/1.73M2
GLUCOSE BLD-MCNC: 117 MG/DL (ref 70–99)
GLUCOSE BLD-MCNC: 137 MG/DL (ref 70–99)
GLUCOSE BLD-MCNC: 145 MG/DL (ref 70–99)
GLUCOSE BLD-MCNC: 197 MG/DL (ref 70–99)
GLUCOSE BLD-MCNC: 204 MG/DL (ref 70–99)
GLUCOSE BLD-MCNC: 219 MG/DL (ref 70–99)
HCT VFR BLD CALC: 29.2 % (ref 42–52)
HEMOGLOBIN: 9.4 GM/DL (ref 13.5–18)
LACTATE: 1.3 MMOL/L (ref 0.4–2)
MCH RBC QN AUTO: 29.7 PG (ref 27–31)
MCHC RBC AUTO-ENTMCNC: 32.2 % (ref 32–36)
MCV RBC AUTO: 92.1 FL (ref 78–100)
PDW BLD-RTO: 13.2 % (ref 11.7–14.9)
PLATELET # BLD: 230 K/CU MM (ref 140–440)
PMV BLD AUTO: 10.4 FL (ref 7.5–11.1)
POTASSIUM SERPL-SCNC: 4.1 MMOL/L (ref 3.5–5.1)
RBC # BLD: 3.17 M/CU MM (ref 4.6–6.2)
SODIUM BLD-SCNC: 144 MMOL/L (ref 135–145)
TOTAL PROTEIN: 5.4 GM/DL (ref 6.4–8.2)
WBC # BLD: 7.8 K/CU MM (ref 4–10.5)

## 2022-08-01 PROCEDURE — 6370000000 HC RX 637 (ALT 250 FOR IP): Performed by: HOSPITALIST

## 2022-08-01 PROCEDURE — 97530 THERAPEUTIC ACTIVITIES: CPT

## 2022-08-01 PROCEDURE — 6370000000 HC RX 637 (ALT 250 FOR IP): Performed by: INTERNAL MEDICINE

## 2022-08-01 PROCEDURE — 6370000000 HC RX 637 (ALT 250 FOR IP): Performed by: NURSE PRACTITIONER

## 2022-08-01 PROCEDURE — 36592 COLLECT BLOOD FROM PICC: CPT

## 2022-08-01 PROCEDURE — 6370000000 HC RX 637 (ALT 250 FOR IP): Performed by: STUDENT IN AN ORGANIZED HEALTH CARE EDUCATION/TRAINING PROGRAM

## 2022-08-01 PROCEDURE — 1200000000 HC SEMI PRIVATE

## 2022-08-01 PROCEDURE — 83605 ASSAY OF LACTIC ACID: CPT

## 2022-08-01 PROCEDURE — 85027 COMPLETE CBC AUTOMATED: CPT

## 2022-08-01 PROCEDURE — 90935 HEMODIALYSIS ONE EVALUATION: CPT

## 2022-08-01 PROCEDURE — 80053 COMPREHEN METABOLIC PANEL: CPT

## 2022-08-01 PROCEDURE — 97116 GAIT TRAINING THERAPY: CPT

## 2022-08-01 PROCEDURE — 99222 1ST HOSP IP/OBS MODERATE 55: CPT | Performed by: NURSE PRACTITIONER

## 2022-08-01 PROCEDURE — 36415 COLL VENOUS BLD VENIPUNCTURE: CPT

## 2022-08-01 PROCEDURE — 82962 GLUCOSE BLOOD TEST: CPT

## 2022-08-01 PROCEDURE — 94761 N-INVAS EAR/PLS OXIMETRY MLT: CPT

## 2022-08-01 PROCEDURE — 6360000002 HC RX W HCPCS: Performed by: INTERNAL MEDICINE

## 2022-08-01 RX ORDER — TRAZODONE HYDROCHLORIDE 50 MG/1
50 TABLET ORAL NIGHTLY PRN
Status: DISCONTINUED | OUTPATIENT
Start: 2022-08-01 | End: 2022-08-03 | Stop reason: HOSPADM

## 2022-08-01 RX ORDER — ARIPIPRAZOLE 10 MG/1
5 TABLET ORAL NIGHTLY
Status: DISCONTINUED | OUTPATIENT
Start: 2022-08-01 | End: 2022-08-03 | Stop reason: HOSPADM

## 2022-08-01 RX ADMIN — SUCRALFATE 1 G: 1 TABLET ORAL at 06:24

## 2022-08-01 RX ADMIN — INSULIN LISPRO 2 UNITS: 100 INJECTION, SOLUTION INTRAVENOUS; SUBCUTANEOUS at 17:01

## 2022-08-01 RX ADMIN — EPOETIN ALFA-EPBX 10000 UNITS: 10000 INJECTION, SOLUTION INTRAVENOUS; SUBCUTANEOUS at 09:10

## 2022-08-01 RX ADMIN — ARIPIPRAZOLE 5 MG: 10 TABLET ORAL at 22:19

## 2022-08-01 RX ADMIN — INSULIN LISPRO 2 UNITS: 100 INJECTION, SOLUTION INTRAVENOUS; SUBCUTANEOUS at 02:41

## 2022-08-01 RX ADMIN — PANTOPRAZOLE SODIUM 40 MG: 40 TABLET, DELAYED RELEASE ORAL at 06:24

## 2022-08-01 RX ADMIN — ASPIRIN 81 MG CHEWABLE TABLET 81 MG: 81 TABLET CHEWABLE at 08:05

## 2022-08-01 RX ADMIN — INSULIN GLARGINE 40 UNITS: 100 INJECTION, SOLUTION SUBCUTANEOUS at 22:20

## 2022-08-01 RX ADMIN — INSULIN LISPRO 20 UNITS: 100 INJECTION, SOLUTION INTRAVENOUS; SUBCUTANEOUS at 08:06

## 2022-08-01 RX ADMIN — OXYCODONE HYDROCHLORIDE 5 MG: 5 TABLET ORAL at 06:24

## 2022-08-01 RX ADMIN — SERTRALINE HYDROCHLORIDE 100 MG: 50 TABLET ORAL at 08:06

## 2022-08-01 RX ADMIN — INSULIN LISPRO 20 UNITS: 100 INJECTION, SOLUTION INTRAVENOUS; SUBCUTANEOUS at 17:01

## 2022-08-01 RX ADMIN — EPOETIN ALFA-EPBX 10000 UNITS: 10000 INJECTION, SOLUTION INTRAVENOUS; SUBCUTANEOUS at 09:09

## 2022-08-01 RX ADMIN — OXYCODONE HYDROCHLORIDE 5 MG: 5 TABLET ORAL at 14:12

## 2022-08-01 RX ADMIN — OXYCODONE HYDROCHLORIDE 5 MG: 5 TABLET ORAL at 22:29

## 2022-08-01 RX ADMIN — SUCRALFATE 1 G: 1 TABLET ORAL at 16:29

## 2022-08-01 RX ADMIN — CLOPIDOGREL BISULFATE 75 MG: 75 TABLET ORAL at 08:06

## 2022-08-01 RX ADMIN — CALCITRIOL CAPSULES 0.25 MCG 0.25 MCG: 0.25 CAPSULE ORAL at 08:05

## 2022-08-01 RX ADMIN — PANTOPRAZOLE SODIUM 40 MG: 40 TABLET, DELAYED RELEASE ORAL at 16:29

## 2022-08-01 RX ADMIN — FLUDROCORTISONE ACETATE 0.1 MG: 0.1 TABLET ORAL at 08:06

## 2022-08-01 RX ADMIN — ATORVASTATIN CALCIUM 40 MG: 40 TABLET, FILM COATED ORAL at 22:19

## 2022-08-01 ASSESSMENT — PAIN DESCRIPTION - FREQUENCY: FREQUENCY: CONTINUOUS

## 2022-08-01 ASSESSMENT — PAIN DESCRIPTION - PAIN TYPE
TYPE: ACUTE PAIN;CHRONIC PAIN
TYPE: ACUTE PAIN
TYPE: ACUTE PAIN;CHRONIC PAIN

## 2022-08-01 ASSESSMENT — PAIN DESCRIPTION - LOCATION
LOCATION: NECK;FOOT
LOCATION: NECK

## 2022-08-01 ASSESSMENT — PAIN DESCRIPTION - DESCRIPTORS
DESCRIPTORS: SORE;TIGHTNESS
DESCRIPTORS: STABBING
DESCRIPTORS: SORE;TIGHTNESS

## 2022-08-01 ASSESSMENT — PAIN SCALES - GENERAL
PAINLEVEL_OUTOF10: 7
PAINLEVEL_OUTOF10: 7
PAINLEVEL_OUTOF10: 5
PAINLEVEL_OUTOF10: 2
PAINLEVEL_OUTOF10: 2
PAINLEVEL_OUTOF10: 5
PAINLEVEL_OUTOF10: 6

## 2022-08-01 ASSESSMENT — PAIN DESCRIPTION - ONSET: ONSET: ON-GOING

## 2022-08-01 ASSESSMENT — PAIN - FUNCTIONAL ASSESSMENT: PAIN_FUNCTIONAL_ASSESSMENT: PREVENTS OR INTERFERES SOME ACTIVE ACTIVITIES AND ADLS

## 2022-08-01 ASSESSMENT — PAIN DESCRIPTION - ORIENTATION: ORIENTATION: RIGHT;LEFT

## 2022-08-01 NOTE — PROGRESS NOTES
Progress Note( Dr. Falguni Gonzalez)  8/1/2022  Subjective:   Admit Date: 7/24/2022  PCP: Shani Lucia MD    Admitted For :Generalized weakness and syncope       Consulted For:  Better control of blood glucose    Interval History: Had EGD done this morning shows  ) Snadra-Roberson tear , no active bleed at this point                2) normal stomach, normal esophagus                3) normal duodenum  Cardiac stress test negative for any CAD  Patient unable to do tilt test  With Florinef on board patient's blood pressures seem to be somewhat better with less postural hypotension      Denies any chest pains,   Denies SOB . Denies nausea or vomiting. No new bowel or bladder symptoms. Intake/Output Summary (Last 24 hours) at 8/1/2022 0747  Last data filed at 8/1/2022 0713  Gross per 24 hour   Intake 1200 ml   Output 1600 ml   Net -400 ml         DATA    CBC:   Recent Labs     07/30/22  0556 07/31/22  0123   WBC 8.0 8.8   HGB 8.8* 9.2*    213      CMP:  Recent Labs     07/30/22  0556 07/31/22  0123   * 141   K 4.3 4.2   * 106   CO2 25 23   BUN 42* 49*   CREATININE 5.8* 6.1*   CALCIUM 7.8* 7.6*   PROT 5.0* 5.2*   LABALBU 2.5* 2.6*   BILITOT 0.2 0.2   ALKPHOS 95 80   AST 10* 12*   ALT 11 12       Lipids:   Lab Results   Component Value Date/Time    CHOL 145 05/13/2021 10:28 AM    CHOL 168 10/15/2013 10:30 AM    HDL 29 05/13/2021 10:28 AM    TRIG 188 05/13/2021 10:28 AM     Glucose:  Recent Labs     07/31/22 2017 08/01/22  0207 08/01/22  0623   POCGLU 175* 204* 137*       [id number]:  Lab Results   Component Value Date/Time    LABA1C 10.0 07/25/2022 06:50 AM     High Sensitivity TSH:   Lab Results   Component Value Date/Time    TSHHS 3.860 07/25/2022 06:50 AM     Free T3: No results found for: FT3  Free T4:  Lab Results   Component Value Date/Time    T4FREE 1.25 07/24/2017 11:10 AM       MRI BRAIN WO CONTRAST   Final Result   1. No acute intracranial abnormality.    2. Trace bilateral mastoid effusions. NM MYOCARDIAL SPECT REST EXERCISE OR RX   Final Result      IR TUNNELED CVC PLACE WO SQ PORT/PUMP > 5 YEARS   Final Result   Successful ultrasound and fluoroscopy guided Port-A-Cath/tunneled dialysis   access catheter placement via right internal jugular venous approach. XR CHEST PORTABLE   Final Result   Temper dialysis access catheter via right lower cervical approach with tip in   the mid right atrium. No complication suggested. Underlying findings consistent with congestive heart failure/hypervolemia s/p   or low lung volumes without detectable pleural effusion. US RETROPERITONEAL LIMITED   Final Result   Unremarkable ultrasound of the kidneys. CT ABDOMEN PELVIS WO CONTRAST Additional Contrast? None   Final Result   No acute process in the abdomen or pelvis. CT CERVICAL SPINE WO CONTRAST   Final Result   No acute abnormality of the cervical spine. CT HEAD WO CONTRAST   Final Result   No acute intracranial abnormality. XR CHEST PORTABLE   Final Result   No acute cardiopulmonary abnormality.          IR NONTUNNELED VASCULAR CATHETER > 5 YEARS    (Results Pending)        Scheduled Medicines   Medications:    fludrocortisone  0.1 mg Oral Daily    pantoprazole  40 mg Oral BID AC    insulin glargine  40 Units SubCUTAneous Nightly    clopidogrel  75 mg Oral Daily    aspirin  81 mg Oral Daily    alogliptin  6.25 mg Oral Daily    insulin lispro  20 Units SubCUTAneous TID WC    insulin lispro  0-8 Units SubCUTAneous TID WC    insulin lispro  0-8 Units SubCUTAneous 2 times per day    sucralfate  1 g Oral BID AC    atorvastatin  40 mg Oral Nightly    sertraline  100 mg Oral Daily    sodium chloride flush  5-40 mL IntraVENous 2 times per day    calcitRIOL  0.25 mcg Oral Daily    epoetin paola-epbx  10,000 Units IntraVENous Once per day on Mon Wed Fri      Infusions:    dextrose      sodium chloride 20 mL/hr at 07/26/22 0802         Objective:   Vitals: BP (!) 152/68   Pulse 92   Temp 97.4 °F (36.3 °C) (Oral)   Resp 16   Ht 5' 9\" (1.753 m)   Wt (!) 311 lb 4.6 oz (141.2 kg)   SpO2 95%   BMI 45.97 kg/m²   General appearance: alert and cooperative with exam  Neck: no JVD or bruit  Thyroid : Normal lobes   Lungs: Has Vesicular Breath sounds   Heart:  regular rate and rhythm  Abdomen: soft, non-tender; bowel sounds normal; no masses,  no organomegaly  Musculoskeletal: Normal  Extremities: extremities normal, , bilateral leg edema  Neurologic:  Awake, alert, oriented to name, place and time. Cranial nerves II-XII are grossly intact. Motor is  intact.   Sensory neuropathy,  and gait is normal.    Assessment:     Patient Active Problem List:     Hiatal hernia     Diabetes mellitus type 2, improved controlled     Diabetic neuropathy (HCC)     Panic attack     Anxiety associated with depression     Neck pain     DANIELA (obstructive sleep apnea)     Urinary frequency     Bilateral carpal tunnel syndrome- left worse than right     Hyperlipidemia with target LDL less than 100     Essential hypertension     Bronchitis     Osteomyelitis (HCC)     Abscess     Periumbilical hernia     Sepsis (HCC)     Cellulitis of left foot     Constipation     Intractable nausea and vomiting     Uncontrolled type 2 diabetes mellitus (HCC)     Nausea and vomiting     Diabetic foot infection ([address]     Acute renal failure (ARF) (HCC)     Cellulitis     Cellulitis of left arm     Cellulitis, scrotum     Acute epididymitis     Irene gangrene     Recurrent major depressive disorder, in full remission ([address]     Generalized anxiety disorder     Morbid obesity with body mass index (BMI) of 40.0 to 44.9 in adult [facility])     Necrotizing fasciitis ([address]     Syncope     Right groin wound     Ulcer of right groin with fat layer exposed ([address]     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 type 2 diabetes mellitus, with muscle involvement without evidence of necrosis (HCC)     Nephrotic syndrome     Generalized edema     Stage 3b chronic kidney disease ([facility]     Diabetic nephropathy associated with type 2 diabetes mellitus (HCC)     Hypoalbuminemia     Chronic kidney disease, stage IV (severe) ([facility])     FH: CAD (coronary artery disease)     ASCVD (arteriosclerotic cardiovascular disease)     Other proteinuria     Chest pain     Surgical wound dehiscence     CARMEN (acute kidney injury) ([facility]     Anemia      Plan:     Reviewed POC blood glucose . Labs and X ray results   Reviewed Current Medicines   On meal/ Correction bolus Humalog/ Basal Lantus Insulin regime  Monitor Blood glucose frequently   Modified  the dose of Insulin/ other medicines as needed   Now patient is on scheduled hemodialysis   's postural hypotension, hospital started on Florinef  Will follow     .      Vito Machuca MD, MD

## 2022-08-01 NOTE — PLAN OF CARE
Problem: Pain  Goal: Verbalizes/displays adequate comfort level or baseline comfort level  Flowsheets (Taken 8/1/2022 1602)  Verbalizes/displays adequate comfort level or baseline comfort level:   Encourage patient to monitor pain and request assistance   Assess pain using appropriate pain scale   Administer analgesics based on type and severity of pain and evaluate response

## 2022-08-01 NOTE — PROGRESS NOTES
Nephrology  Dialysis Note        [address], [address] [address]  Phone: [phone]  Office Hours: 8:30AM - 4:30PM  Monday - Friday          PROCEDURE:  Patient seen during hemodialysis      PHYSICIAN:  ANTHONY      INDICATION:  End-stage renal disease      RX:  See dialysis flowsheet for specifics on access, blood flow rate, dialysate baths, duration of dialysis, anticoagulation and other technical information.       COMMENTS:  SEEN ON HD  SET TO LOSE 2KG IF TOLERATED    Electronically signed by Jaylyn Landa DO on 8/1/2022 at MD Terri Monreal DO  [facility] [address]  [place], [place]  PHONE: [phone]  FAX: [phone]

## 2022-08-01 NOTE — CARE COORDINATION
Dr. Godfrey García advised this RN CM that pt would now like to go home. Pt will be doing M/W/F HD through [facility] on [address] [facility] 1st shift. [address] to bedside stating that pt wants to now go to ARU after speaking with Brock Lanza. LVM for Marcelle East RN , ARU to please continue to follow pt.

## 2022-08-01 NOTE — PROCEDURES
PATIENT COMPLETED A 3 HOUR HD TREATMENT, 2.0L OF FLUID WAS REMOVED AS ORDERED. RIGHT TUNNELED CVC WAS USED FOR ACCESS, STERILE DRESSING CHANGE COMPLETED. POST TREATMENT LUMENS WERE FLUSHED, AND CAPPED X2.  RETACRIT GIVEN BY NURSE AS ORDERED. TREATMENT COMPLETED BY:  Abdirahman Shah    Patient Name: Conenr Chun  Patient : 1975  MRN: [id number]     Acct: [de-identified]  Date of Admission: 2022  [facility]  Code Status:  Full Code  Allergies:    Allergies   Allergen Reactions    Adhesive Tape Rash    Doxycycline Nausea And Vomiting    Reglan [Metoclopramide] Anxiety     Diagnosis:    Patient Active Problem List   Diagnosis    Hiatal hernia    Diabetes mellitus type 2, improved controlled    Diabetic neuropathy (HCC)    Panic attack    Anxiety associated with depression    Neck pain    DANIELA (obstructive sleep apnea)    Urinary frequency    Bilateral carpal tunnel syndrome- left worse than right    Hyperlipidemia with target LDL less than 100    Essential hypertension    Bronchitis    Osteomyelitis (HCC)    Abscess    Periumbilical hernia    Sepsis (HCC)    Cellulitis of left foot    Constipation    Intractable nausea and vomiting    Uncontrolled type 2 diabetes mellitus (HCC)    Nausea and vomiting    Diabetic foot infection (HCC)    Acute renal failure (ARF) (HCC)    Cellulitis    Cellulitis of left arm    Cellulitis, scrotum    Acute epididymitis    Irene gangrene    Recurrent major depressive disorder, in full remission ([address]    Generalized anxiety disorder    Morbid obesity with body mass index (BMI) of 40.0 to 44.9 in [facility])    Necrotizing fasciitis (HCC)    Syncope    Right groin wound    Ulcer of right groin with fat layer exposed ([address]    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 type 2 diabetes mellitus, with muscle involvement without evidence of necrosis (HCC)    Nephrotic syndrome    Generalized edema    Stage 3b chronic kidney disease ([address]    Diabetic nephropathy associated with type 2 diabetes mellitus (HCC)    Hypoalbuminemia    Chronic kidney disease, stage IV (severe) ([facility])    FH: CAD (coronary artery disease)    ASCVD (arteriosclerotic cardiovascular disease)    Other proteinuria    Chest pain    Surgical wound dehiscence    CARMEN (acute kidney injury) ([facility]    Anemia         Treatment:  Hemodilaysis 2:1  Priority: Routine  Location: Acute Room    Diabetic: Yes  NPO: No  Isolation Precautions: Dialysis     Consent for Treatment Verified: Yes  Blood Consent Verified: Not Applicable     Safety Verified: Identify (I), Consent (C), Equipment (E), HepB Status (B), Orders Complete (O), Access Verified (A), and Timeliness (T)  Time out performed prior to access at 0815 hours. Report Received from Primary RN at 0720 hours. Primary RN (First Initial, Last Name, Title): Ahmet Resendez RN  Incapacitated Nurse Education Completed: Not Applicable     HBsAg ONLY:  Date Drawn: July 25, 2022       Results: Negative  HBsAb:  Date Drawn:  July 25, 2022       Results: Susceptible <10    Order  Dialysis Bath  K+ (Potassium): 2  Ca+ (Calcium):  (3.5)  Na+ (Sodium): 138  HCO3 (Bicarb): 30  Bicarbonate Concentrate Lot No.: [id number]  Acid Concentrate Lot No.: [id number]     Na+ Modeling: Not Applicable  Dialyzer: [id number]  Dialysate Temperature (C):  35  Blood Flow Rate (BFR):  250   Dialysate Flow Rate (DFR):   600        Access to be Utilized   Access:  Tunneled Catheter  Location: Internal Jugular  Side: Right   First Use X-ray Verified: Not Applicable  OK to use line order: Not Applicable    Site Assessment:  Signs and Symptoms of Infection/Inflammation: None  If yes: Not Applicable  Dressing: Dry and Intact  Site Prep: Medical Aseptic Technique  Dressing Changed this Treatment: Yes  If yes, by whom:  SANJANA RAMIREZ  Date of Last Dressing Change: Not Applicable  Antimicrobial Patch in place?: Yes  Blue Caps in place?: Yes  Gauze Dressing?: No  Non Dialysis Use?: No  Comment:    Flows: Good, Patent  If access problem, who was notified:     Pre and Post-Assessment  Patient Vitals for the past 8 hrs:   Level of Consciousness Oriented X Heart Rhythm Respiratory Quality/Effort O2 Device Bilateral Breath Sounds Skin Color Skin Condition/Temp Abdomen Inspection Bowel Sounds (All Quadrants) Edema Pain Level   08/01/22 0624 -- -- -- -- -- -- -- -- -- -- -- 6   08/01/22 0820 Alert (0) 4 Regular Unlabored None (Room air) Clear [place] Dry;Warm Soft Active None 2   08/01/22 1115 Alert (0) 4 Regular Unlabored None (Room air) Clear [place] Dry;Warm Soft Active None 2   08/01/22 1226 Alert (0) -- -- -- -- -- Pink Dry;Warm Soft -- -- --     Labs  Recent Labs     07/30/22  0556 07/31/22  0123   WBC 8.0 8.8   HGB 8.8* 9.2*   HCT 26.9* 28.2*    213                                                                  Recent Labs     07/30/22  0556 07/31/22  0123 08/01/22  0650   * 141 144   K 4.3 4.2 4.1   * 106 108   CO2 25 23 24   BUN 42* 49* 53*   CREATININE 5.8* 6.1* 6.5*   GLUCOSE 201* 172* 145*     IV Drips and Rate/Dose   dextrose      sodium chloride 20 mL/hr at 07/26/22 0802      Safety - Before each treatment:   Dialysis Machine No.: [id number]   Machine Number: [id number]  Dialyzer Lot No.: [id number]   Machine Log Sheet Completed: Yes  Machine Alarm Self Test: Completed; Passed (08/01/22 0815)     Air Foam Detector: Tested, Proper Function  Extracorporeal Circuit Tested for Integrity: Yes  Machine Conductivity: 13.9  Manual Conductivity: 13.6  Machine Ph: 7.3  Bicarbonate Concentrate Lot No.: [id number]  Acid Concentrate Lot No.: [id number]  Manual Ph: 7.4  Bleach Test (Neg): Yes  Bath Temperature: 95 °F (35 °C)  Tubing Lot#: [id number]  Conductivity Meter Serial #: [id number]  All Connections Secure?: Yes  Venous Parameters Set?: Yes  Arterial Parameters Set?: Yes  Saline Line Double Clamped?: Yes  Air Foam Detector Engaged?: Yes  Machine Functioning Alarm Free?  Yes  Prime Given: 200ml    Chlorine Testing - Before each treatment and every 4 hours:   Treatment  Treatment Number: 4  Time On: 0820  Time Off: 1110  Treatment Goal: 3 HOUR. 3.0L  Weight: (!) 305 lb 1.9 oz (138.4 kg) (08/01/22 1110)  1st check: less than 0.1 ppm at: 0635 hours  2nd check: less than 0.1 ppm at: 1015 hours  3rd check: Not Applicable  (if greater than 0.1 ppm, then check every 30 minutes from secondary)    Access Flows and Pressures  Patient Vitals for the past 8 hrs:   Blood Flow Rate (ml/min) Ultrafiltration Rate (ml/hr) Arterial Pressure (mmHg) Venous Pressure (mmHg) TMP DFR Comments Access Visible   08/01/22 0820 250 ml/min 830 ml/hr -80 mmHg 70 60 600 TX STARTED, PRIME GIVEN, LINES SECURE AND CALL LIGHT WITHIN REACH Yes   08/01/22 0830 250 ml/min 830 ml/hr -80 mmHg 80 70 600 lines secure Yes   08/01/22 0845 250 ml/min 830 ml/hr -60 mmHg 80 70 600 RESTING WITH EYES CLOSED Yes   08/01/22 0900 250 ml/min 830 ml/hr -70 mmHg 90 70 600 denies needs Yes   08/01/22 0915 250 ml/min 830 ml/hr -80 mmHg 90 70 600 RESTINGW ITH EYES CLOSED Yes   08/01/22 0930 250 ml/min 830 ml/hr -80 mmHg 90 70 600 no complaints Yes   08/01/22 0945 250 ml/min 830 ml/hr -80 mmHg 90 60 600 RESTING WITH EYES CLOSED Yes   08/01/22 1000 250 ml/min 830 ml/hr -80 mmHg 90 60 600 resting Yes   08/01/22 1015 250 ml/min 830 ml/hr -80 mmHg 90 70 600 no change Yes   08/01/22 1030 250 ml/min 830 ml/hr -80 mmHg 90 70 600 BICARB JUG CHANGED Yes   08/01/22 1045 250 ml/min 830 ml/hr -80 mmHg 100 70 600 no distress Yes   08/01/22 1100 250 ml/min 830 ml/hr -80 mmHg 260 70 600 no complaints Yes   08/01/22 1110 200 ml/min 0 ml/hr -20 mmHg 340 60 600 TX ENDED, RINSEBACK GIVEN Yes     Vital Signs  Patient Vitals for the past 8 hrs:   BP Temp Pulse Resp SpO2 Weight Weight Method Percent Weight Change   08/01/22 0600 -- -- -- -- -- (!) 311 lb 4.6 oz (141.2 kg) Bed scale 0   08/01/22 0624 -- -- -- 15 -- -- -- --   08/01/22 0633 -- -- 92 -- -- -- -- --   08/01/22 0654 -- -- -- 16 -- -- -- -- 08/01/22 0804 (!) 160/101 98.6 °F (37 °C) 88 19 97 % -- -- --   08/01/22 0820 (!) 158/72 98 °F (36.7 °C) 84 16 98 % (!) 309 lb 11.9 oz (140.5 kg) Bed scale -0.5   08/01/22 0830 (!) 164/90 -- 84 14 -- -- -- --   08/01/22 0845 127/82 -- 84 18 -- -- -- --   08/01/22 0900 118/66 -- 84 12 -- -- -- --   08/01/22 0915 (!) 141/74 -- 83 16 -- -- -- --   08/01/22 0930 139/68 -- 82 11 -- -- -- --   08/01/22 0945 118/73 -- 82 14 -- -- -- --   08/01/22 1000 118/75 -- 81 14 -- -- -- --   08/01/22 1015 107/72 -- 83 15 -- -- -- --   08/01/22 1030 111/74 -- 82 14 -- -- -- --   08/01/22 1045 89/70 -- 83 17 -- -- -- --   08/01/22 1100 100/62 -- 84 17 -- -- -- --   08/01/22 1110 (!) 101/52 97.3 °F (36.3 °C) 84 14 100 % (!) 305 lb 1.9 oz (138.4 kg) Bed scale 0   08/01/22 1115 (!) 101/52 -- 84 17 100 % -- -- --     Post-Dialysis  Arterial Catheter Locking Solution:  1.9 ML NS  Venous Catheter Locking Solution:  1.9 ML NS  Post-Treatment Procedures: Blood returned, Catheter Capped, clamped with Saline x2 ports  Machine Disinfection Process: Exterior Machine Disinfection  Rinseback Volume (ml): 300 ml  Blood Volume Processed (Liters): 40.7 l/min  Dialyzer Clearance: Clotted  Duration of Treatment (minutes): 180 minutes     Hemodialysis Intake (ml): 500 ml  Hemodialysis Output (ml): 2500 ml     Tolerated Treatment: Good  Patient Response to Treatment: TOLERATED WELL  Physician Notified: No       Provider Notification        Handoff complete and report given to Primary RN at 1120 hours.   Primary RN (First Initial, Last Name, Title):    [employer] RN     Education  Person Educated: Patient   Knowledge Base: Substantial  Barriers to Learning?: None  Preferred method of Learning: Oral  Topic(s): Access Care, Procedural, and Treatment Options   Teaching Tools: Explanation   Response to Education: Verbalized Understanding     Electronically signed by Neal Thornton RN on 8/1/2022 at 12:58 PM

## 2022-08-01 NOTE — PROGRESS NOTES
Physical Therapy  Name: Marylee Shines MRN: [id number] :   1975   Date:  2022   Admission Date: 2022 Room:  [address] [place] [address]   Restrictions/Precautions:  Restrictions/Precautions  Restrictions/Precautions: Fall Risk, General Precautions         Communication with other providers:  Jing Richards RN states pt is ok to see for therapy    Subjective:  Patient states:  Patient is agreeable for rehab. Moderate education on benefit of ARU to improve patient strength, endurance and upright tolerance for increase safety and function at home. Pain:   Location, Type, Intensity (0/10 to 10/10):  posterior neck stiffness    Objective:    Observation:  Patient seated on EOB at beginning of session  BP seated after SPT: 103/67, Standin/58 and after ambulation 100/58  Transfers with line management of [place] [employer]  Sit to stand :from EOB Min A with immediate symptoms of dizziness, from recliner x 3 sets CGA. Standing tolerance: Patient is unable to tolerates more than 3' standing before feelings of dizziness overcome him. Brief seated rest break needed for patient to recover  Stand to sit :CGA to recliner  SPT:Stand pivot from EOB to recliner CGA. Dizzy during transfer    Gait:  Pt amb with RW for 25 ft + 30 ft with CGA-Min A assist. Cues for upright posture and for symptom management. Chair follow needed for patient safety  Patient unable to ambulate independently today d/t poor upright tolerance. Patient ambulated short distances and needed seated rest breaks from \"dizziness\". Instability increases with symptoms of dizziness and moderate sway present towards the end of ambulation distances. Exercises  Sitting Exercises: Ankle pumps x 10  LAQ's x 10    Standing exercise: Glute sets for venous return x10    Therapeutic exercises were instructed today. Cues were given for technique, safety, recruitment, and rationale. Cues were verbal and/or tactile.     Safety  Patient left safely in the recliner, with call light/phone in reach with alarm applied. Gait belt and mask were used for transfers and gait. Assessment / Impression:   Patient has fair tolerance with today's treatment. He is able to tolerate increase time with rehab given seated rest breaks to resolve dizziness symptoms. Patient's tolerance of treatment:  Fair   Adverse Reaction: none  Significant change in status and impact:  none  Barriers to improvement:  tolerance to upright     Plan for Next Session:    Will cont to work towards pt's goals per patient tolerance  Time in:  1321  Time out:  1355  Timed treatment minutes:  34  Total treatment time:  34  Previously filed items:  Social/Functional History  Lives With: Spouse  Type of Home: House  Home Layout: Two level (has to ascend stairs)  Home Access: Stairs to enter without rails  Entrance Stairs - Number of Steps: 3  Bathroom Shower/Tub: Tub/Shower unit  Bathroom Toilet: Handicap height  Bathroom Accessibility: Walker accessible  Home Equipment: Cane  Has the patient had two or more falls in the past year or any fall with injury in the past year?: No (2 falls but without injury d/t LOC)  Receives Help From: Family (mainly wife)  ADL Assistance: Needs assistance (recent digression in ADLs/IADLs)  Homemaking Assistance: Needs assistance  Ambulation Assistance: Needs assistance  Transfer Assistance: Needs assistance  Active : No  Patient's  Info: wife drives  Occupation: On disability  Short Term Goals  Time Frame for Short term goals: 1 week  Short term goal 1: pt will complete bed mobility at mod ind  Short term goal 2: pt will complete transfers at [place] [address] term goal 3: pt will ambulate 75 ft using FWW at [place] [address] term goal 4: pt will demonstrate 5 mins of standing tolerance w/ minimal pt reported dizziness to allow for inc capacity to perfrom functional mobility       Electronically signed by:     Jamie Godinez PTA  8/1/2022, 2:03 PM

## 2022-08-01 NOTE — PROGRESS NOTES
as the GI signed off , was Contacted primary care physician Dr. Matias Pickering reports that the patient was last seen in office in October 2021 and was not on Eliquis. discontinue Eliquis   , resumed on  aspirin and Plavix to complete 12 months of DAPT post PCI. Continue IV Protonix twice daily, switched to oral on d/c  GI signed out  Monitor CBC and transfuse if hemoglobin less than 7  Hb stable at 9.2 7/31     *End-stage renal disease on hemodialysis  -started HD 7/25/2022  Nephrology following, appreciate recommendations  Monitor BMP  Tunneled HD catheter placed   outpatient HD set up, hepatitis panel negative for hepatitis B core antibody  Continue on calcitrol daily per nephro      *Type 2 diabetes with hyperglycemia  Basal insulin in addition to low-dose insulin sliding scale  Target blood glucose level 140-180  HbA1c 10%  Endocrinology following     *Intractable nausea/vomiting  -Improving  As needed antinausea medications  Tolerating diet still complain of nausea intermittently     *Hyperkalemia  -Resolved after hemodialysis     Chronic conditions include:  *Coronary artery disease status post PCI in LAD (9/2021)  -Patient is on Plavix and statin outpatient  -Resume aspirin and Plavix after cleared by GI  *Hypertension:Hold antihypertensives in the setting of newly initiated hemodialysis  *Hyperlipidemia: Continue statin  *History of Irene's gangrene  *History of toe amputations       Diet ADULT DIET; Regular; 4 carb choices (60 gm/meal);  Low Potassium (Less than 3000 mg/day); 2000 ml  ADULT ORAL NUTRITION SUPPLEMENT; Lunch; Renal Oral Supplement   DVT Prophylaxis [] Lovenox, []  Heparin, [x] SCDs, [] Ambulation,  [] Eliquis, [] Xarelto  [] Coumadin   Code Status Full Code   Disposition From: Home  Expected Disposition: Home  Estimated Date of Discharge:   Patient requires continued admission due to tunneled HD catheter placement, outpatient HD set up , still feels dizzy    Surrogate Decision Maker/ POA Wife Subjective:     Chief Complaint: Loss of Consciousness (Did not hit head, no blood thinners)       Carly Matta is a 52 y.o. male who presents with a syncopal episode. Patient was seen and evaluated at during hemodialysis. Patient reports mild nausea but states otherwise that his nausea/vomiting episodes have improved significantly since admission. Patient seems anxious about outcomes after this hospitalization. Detailed discussion regarding treatment plan which includes placement of a tunneled HD catheter and outpatient set up of hemodialysis. Patient apparently was supposed to be on Eliquis and Plavix but reports that he only takes Plavix. Upon chart review unsure about the reasoning of Eliquis. The patient was seen and examined at bedside  Denies CP or SOB  Tunneled HD catheter placement by IR 7/27 , HD on Friday 7/29  Still complain of dizziness and lightheadedness with standing up  Positive  orthostatic BP  MRI of the brain  nonacute  The patient now considering going to ARU            Objective: Intake/Output Summary (Last 24 hours) at 8/1/2022 1309  Last data filed at 8/1/2022 1110  Gross per 24 hour   Intake 1100 ml   Output 4100 ml   Net -3000 ml          Vitals:   Vitals:    08/01/22 1115   BP: (!) 101/52   Pulse: 84   Resp: 17   Temp:    SpO2: 100%       Physical Exam:   Physical Exam  Constitutional:       General: He is not in acute distress. Appearance: He is not ill-appearing, toxic-appearing or diaphoretic. HENT:      Head: Normocephalic and atraumatic. Nose: Nose normal.      Mouth/Throat:      Mouth: Mucous membranes are dry. Pharynx: Oropharynx is clear. Eyes:      Conjunctiva/sclera: Conjunctivae normal.      Pupils: Pupils are equal, round, and reactive to light. Neck:      Comments: HD catheter in place undergoing hemodialysis  Cardiovascular:      Rate and Rhythm: Normal rate and regular rhythm. Pulses: Normal pulses.       Heart sounds: Normal Collection Time: 07/31/22  8:17 PM   Result Value Ref Range    POC Glucose 175 (H) 70 - 99 MG/DL   POCT Glucose    Collection Time: 08/01/22  2:07 AM   Result Value Ref Range    POC Glucose 204 (H) 70 - 99 MG/DL   POCT Glucose    Collection Time: 08/01/22  6:23 AM   Result Value Ref Range    POC Glucose 137 (H) 70 - 99 MG/DL   Comprehensive Metabolic Panel    Collection Time: 08/01/22  6:50 AM   Result Value Ref Range    Sodium 144 135 - 145 MMOL/L    Potassium 4.1 3.5 - 5.1 MMOL/L    Chloride 108 99 - 110 mMol/L    CO2 24 21 - 32 MMOL/L    BUN 53 (H) 6 - 23 MG/DL    Creatinine 6.5 (H) 0.9 - 1.3 MG/DL    Glucose 145 (H) 70 - 99 MG/DL    Calcium 7.7 (L) 8.3 - 10.6 MG/DL    Albumin 2.8 (L) 3.4 - 5.0 GM/DL    Total Protein 5.4 (L) 6.4 - 8.2 GM/DL    Total Bilirubin 0.2 0.0 - 1.0 MG/DL    ALT 11 10 - 40 U/L    AST 11 (L) 15 - 37 IU/L    Alkaline Phosphatase 84 40 - 128 IU/L    GFR Non- 9 (L) >60 mL/min/1.73m2    GFR  11 (L) >60 mL/min/1.73m2    Anion Gap 12 4 - 16   POCT Glucose    Collection Time: 08/01/22 11:39 AM   Result Value Ref Range    POC Glucose 117 (H) 70 - 99 MG/DL        Imaging/Diagnostics Last 24 Hours   CT ABDOMEN PELVIS WO CONTRAST Additional Contrast? None    Result Date: 7/24/2022  EXAMINATION: CT OF THE ABDOMEN AND PELVIS WITHOUT CONTRAST 7/24/2022 9:58 pm TECHNIQUE: CT of the abdomen and pelvis was performed without the administration of intravenous contrast. Multiplanar reformatted images are provided for review. Automated exposure control, iterative reconstruction, and/or weight based adjustment of the mA/kV was utilized to reduce the radiation dose to as low as reasonably achievable. COMPARISON: 08/11/2021 HISTORY: ORDERING SYSTEM PROVIDED HISTORY: pain TECHNOLOGIST PROVIDED HISTORY: Reason for exam:->pain Additional Contrast?->None Reason for Exam: pain FINDINGS: Lower Chest: Partially imaged coronary artery calcification. Organs:  The liver is normal in contour and attenuation. Gallbladder is unremarkable. No biliary ductal dilatation. Pancreas, adrenals, kidneys, spleen are unremarkable. Mild calcific atherosclerosis. GI/Bowel: Left colonic diverticulosis. Bowel is nondilated without wall thickening. Appendix is normal. Pelvis: Unremarkable. Peritoneum/Retroperitoneum: No free air, free fluid, organized fluid collection, lymphadenopathy. Bones/Soft Tissues: No acute bone or soft tissue abnormality. No acute process in the abdomen or pelvis. CT HEAD WO CONTRAST    Result Date: 7/24/2022  EXAMINATION: CT OF THE HEAD WITHOUT CONTRAST  7/24/2022 9:57 pm TECHNIQUE: CT of the head was performed without the administration of intravenous contrast. Automated exposure control, iterative reconstruction, and/or weight based adjustment of the mA/kV was utilized to reduce the radiation dose to as low as reasonably achievable. COMPARISON: 11/25/2019 HISTORY: ORDERING SYSTEM PROVIDED HISTORY: syncope TECHNOLOGIST PROVIDED HISTORY: Has a \"code stroke\" or \"stroke alert\" been called? ->No Reason for exam:->syncope Decision Support Exception - unselect if not a suspected or confirmed emergency medical condition->Emergency Medical Condition (MA) Reason for Exam: syncope FINDINGS: BRAIN/VENTRICLES: There is no acute intracranial hemorrhage, mass effect or midline shift. No abnormal extra-axial fluid collection. The gray-white differentiation is maintained without evidence of an acute infarct. There is no evidence of hydrocephalus. ORBITS: The visualized portion of the orbits demonstrate no acute abnormality. SINUSES: The visualized paranasal sinuses and mastoid air cells demonstrate no acute abnormality. SOFT TISSUES/SKULL:  No acute abnormality of the visualized skull or soft tissues. No acute intracranial abnormality.      CT CERVICAL SPINE WO CONTRAST    Result Date: 7/24/2022  EXAMINATION: CT OF THE CERVICAL SPINE WITHOUT CONTRAST 7/24/2022 9:58 pm TECHNIQUE: CT of the cervical spine was performed without the administration of intravenous contrast. Multiplanar reformatted images are provided for review. Automated exposure control, iterative reconstruction, and/or weight based adjustment of the mA/kV was utilized to reduce the radiation dose to as low as reasonably achievable. COMPARISON: 07/09/2018 HISTORY: ORDERING SYSTEM PROVIDED HISTORY: fall TECHNOLOGIST PROVIDED HISTORY: Reason for exam:->fall Decision Support Exception - unselect if not a suspected or confirmed emergency medical condition->Emergency Medical Condition (MA) Reason for Exam: fall FINDINGS: BONES/ALIGNMENT: There is no acute fracture or traumatic malalignment. DEGENERATIVE CHANGES: No significant degenerative changes. SOFT TISSUES: There is no prevertebral soft tissue swelling. No acute abnormality of the cervical spine. XR CHEST PORTABLE    Result Date: 7/24/2022  EXAMINATION: ONE XRAY VIEW OF THE CHEST 7/24/2022 8:31 pm COMPARISON: 09/30/2021 HISTORY: ORDERING SYSTEM PROVIDED HISTORY: Chest pain TECHNOLOGIST PROVIDED HISTORY: Reason for exam:->Chest pain Reason for Exam: loss of consciousness Additional signs and symptoms: chest pain FINDINGS: Cardiomediastinal silhouette is unchanged in size. There is no pleural effusion or pneumothorax. There is no pulmonary consolidation. There is no acute osseous abnormality. No acute cardiopulmonary abnormality. US RETROPERITONEAL LIMITED    Result Date: 7/25/2022  EXAMINATION: ULTRASOUND OF THE KIDNEYS 7/24/2022 11:18 pm COMPARISON: None. HISTORY: ORDERING SYSTEM PROVIDED HISTORY: García TECHNOLOGIST PROVIDED HISTORY: Reason for exam:->García Reason for Exam: garcía FINDINGS: The right kidney measures 12.8 cm in length and the left kidney measures 13.2 cm in length. Kidneys demonstrate normal cortical echogenicity. No hydronephrosis or intrarenal stones. No focal lesions. Unremarkable ultrasound of the kidneys.        Electronically signed by Sonya Silverman Jose Wolfe MD on 8/1/2022 at 1:09 PM

## 2022-08-01 NOTE — PROGRESS NOTES
Occupational Therapy    Attempted at 1045 hrs c nurse reporting off floor for dialysis    Occupational Therapy Treatment Note    Name: Ruslan Beckham MRN: [id number] :   1975   Date:  2022   Admission Date: 2022 Room:  [address] [place] [address]-A     Primary Problem:   The primary encounter diagnosis was Syncope and collapse. Diagnoses of CARMEN (acute kidney injury) ([address] Hypocalcemia, Hyperglycemia, and Elevated troponin were also pertinent to this visit. Pt  has a past medical history of Abscess, Acute renal failure (ARF) ([address] Anxiety associated with depression, Anxiety associated with depression, Back pain, Chest pain, Diabetic nephropathy ([address] Diabetic neuropathy ([address] Diabetic ulcer of left midfoot associated with type 2 diabetes mellitus, with fat layer exposed ([address] Diverticulosis, DM (diabetes mellitus), type 2 ([address] 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, H/O percutaneous left heart catheterization, Hyperlipidemia LDL goal < 100, Hypertension, Internal hemorrhoid, Necrotizing fasciitis ([address] Nose fracture, Panic attacks, Pericarditis, Peripheral autonomic neuropathy due to diabetes mellitus ([address] Sebaceous cyst, URI (upper respiratory infection), WD-Wound, surgical, infected, initial encounter, and Wrist fracture. Restrictions/Precautions:  Restrictions/Precautions  Restrictions/Precautions: Fall Risk, General Precautions       Orthostatic    Communication with other providers: Per chart review, patient is appropriate for therapeutic intervention. ROBIN Garner. Co-Tx viktoriya Ghosh      Subjective:  Patient states:  Pt agreeable to Tx session, reports intermittent dizziness c standing and functional mobility, verbalized understanding for recommendations for ARU. Pain:   Location, Type, Intensity (0/10 to 10/10):  0/10, denies pain, reports posterior neck stiffness    Objective:    Observation: Pt received seated EOB, completing lunch.    Objective Measures:  Pocket Telemetry, checks of BP s/p stands were: 106/67 / pulse 90, 104/58 / pulse 95, 100/58 / pulse 95. Treatment, including education:  Therapeutic Activity Training:   Therapeutic activity training was instructed today. Cues were given for safety, sequence, UE/LE placement, awareness, and balance. Activities performed today included bed mobility training, sup-sit, sit-stand, SPT. Education for role of OT and rationale for therapeutic intervention, recommendations for ARU c overview of ARU protocol. Pt also educated and provided cues throughout Tx session for s/s of decreased activity tolerance r/t dizziness during stands and mobility. Pt verbalized understanding for all instruction. Sit<>stands: Min A + CGA for safety from EOB c pt reporting immediate onset of mild dizziness, cued for buttocks clenches and keeping eyes open. Tolerated 3 minutes stand, then pt identified increased dizziness and need to sit. With seated rest breaks, pt able to perform additional stands, for total of 3. CGA x2 + cues for safe body positioning. Stand Pivot Transfer: Min A, progressing to CGA x2 c RW from EOB to bedside chair. Functional Mobility: CGA/Min A x1-2  for 25 ft and 30 ft + chair follow, this therapist provided cues for s/s of decreased activity tolerance. Pt required seated rest break r/t increased dizziness during mobility. All therapeutic intervention performed c emphasis on dynamic balance / standing tolerance to inc strength, endurance and act tolerance for inc Indep c ADL tasks, func transfers / mobility. Safety  Patient safely in bedside chair + alarm set at end of session, with call light/phone in reach, and nursing aware. Gait belt was used for func transfers / mobility.     Assessment / Impression:    Patient's tolerance of treatment: Fair - managed c increased time and seated rest breaks  Adverse Reaction: Continues c onset of dizziness during stands and mobility, resolved c seated rest breaks  Significant change in status and impact: Progressing mobility,continues to be limited by dizziness. Barriers to improvement: Decreased standing tolerance, dizziness      Plan for Next Session:    Continue per OT POC per patient's tolerance c emphasis on functional transfers / mobility during ADL tasks.      Time in:  1321  Time out:  1355  Timed treatment minutes:  34  Total treatment time:  34      Electronically signed by:    LAUREANO Grigsby  8/1/2022, 1:06 PM    Goals:  Time frame for goals: 2 weeks  Pt will complete feeding tasks with independence at seated level   Pt will complete grooming tasks with independence at standing level with chair nearby for breaks PRN  Pt will complete toileting tasks with CGa using elevated commode and grab bars   Pt will complete UB dressing tasks with independence EOB   Pt will complete LB dressing tasks with SetupA and AE PRN EOB   Pt will complete UB bathing tasks with Gertrude while seated and AE PRN   Pt will complete LB bathing tasks with Gertrude while seated and AE PRN   Pt will complete therapeutic exercise/activity to increase independence in ADL/IADL function  Pt will practice functional transfers and mobility with AD for increased safety and independence

## 2022-08-01 NOTE — DISCHARGE SUMMARY
V2.0  Discharge Summary    Name:  Presley Andujar /Age/Sex: 1975 (3 y.o. male)   Admit Date: 2022  Discharge Date: 22    MRN & CSN:  [id number] & [id number] Encounter Date and Time 22 12:05 PM EDT    Attending:  Ha Busch MD Discharging Provider: Ha Busch MD       Hospital Course:     Brief HPI: Presley Andjuar is a 52 y.o. male who presented with     Brief Problem Based Course:   52 y.o. 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, hypertension, hyperlipidemia, type 2 diabetes and CKD stage IV with baseline around 2.2 who presents with Syncope     Assessment and Plan     *Syncope, seems recurrent     Likely secondary to orthostatic hypotension and metabolic derangements  CT head within normal limits  EKG within normal limits  Neurology consulted, recommend resuming on plavix and lipitor for sec prevention   Neuro recommend psych consult   Neuro signed off   Fall precautions  Consult cardiology to r/o cardiac cause   cardiac stress test normal  Patient refused tilt table test  Could be 2/2 to orthostatic BP  Has positive orthostatic blood pressure  Started on fludrocortisone daily however nephro recommend stop fludro due to high BP   Monitor orthostatic blood pressure  Patient refuse ARU or SNF      *Acute on chronic anemia secondary to GI bleed  EGD 2022: Sandra-Roberson tear, no active bleed  Chronic normocytic anemia due to underlying chronic kidney disease  Started on EPO inpatient    Resumed on  Plavix as the GI signed off , was Contacted primary care physician Dr. Ayaka Hassan reports that the patient was last seen in office in 2021 and was not on Eliquis. discontinue Eliquis   , resumed on  aspirin and Plavix to complete 12 months of DAPT post PCI.   Continue IV Protonix twice daily, switched to oral on d/c  GI signed out  Monitor CBC and transfuse if hemoglobin less than 7  Hb stable at 9.2 7/31     *End-stage renal disease on hemodialysis  -started HD 7/25/2022  Nephrology following, appreciate recommendations  Monitor BMP  Tunneled HD catheter placed   outpatient HD set up, hepatitis panel negative for hepatitis B core antibody  Continue on calcitrol daily per nephro      *Type 2 diabetes with hyperglycemia  Basal insulin in addition to low-dose insulin sliding scale  Target blood glucose level 140-180  HbA1c 10%  Endocrinology following     *Intractable nausea/vomiting  -Improving  As needed antinausea medications  Tolerating diet still complain of nausea intermittently     *Hyperkalemia  -Resolved after hemodialysis     Chronic conditions include:  *Coronary artery disease status post PCI in [facility] (9/2021)  -Patient is on Plavix and statin outpatient  -Resume aspirin and Plavix after cleared by GI  *Hypertension:Hold antihypertensives in the setting of newly initiated hemodialysis  *Hyperlipidemia: Continue statin  *History of Irene's gangrene  *History of toe amputations         The patient expressed appropriate understanding of, and agreement with the discharge recommendations, medications, and plan.      Consults this admission:  IP CONSULT TO NEPHROLOGY  IP CONSULT TO HOSPITALIST  IP CONSULT TO ENDOCRINOLOGY  IP CONSULT TO CASE MANAGEMENT  IP CONSULT TO IV TEAM  IP CONSULT TO NEUROLOGY  IP CONSULT TO GI  IP CONSULT TO GI  IP CONSULT TO INTERVENTIONAL RADIOLOGY  IP CONSULT TO CARDIOLOGY    Discharge Diagnosis:   Syncope        Discharge Instruction:   Follow up appointments: follow up with nephrologist as scheduled   Primary care physician: Elfego Virk MD within 2 weeks  Diet: diabetic diet and renal diet   Activity: activity as tolerated  Disposition: Discharged to:   []Home, [x]HHC, []SNF, []Acute Rehab, []Hospice   Condition on discharge: Stable  Labs and Tests to be Followed up as an outpatient by PCP or Specialist: cbc and CMP in 1 week     Discharge Medications: Medication List        CONTINUE taking these medications      glucose monitoring kit  1 each by Does not apply route once for 1 dose. Lancets Misc  1 each by Does not apply route daily            ASK your doctor about these medications      amLODIPine 10 MG tablet  Commonly known as: NORVASC  Take 1 tablet by mouth daily     apixaban 2.5 MG Tabs tablet  Commonly known as: Eliquis  Take 1 tablet by mouth 2 times daily     aspirin 81 MG EC tablet  Take 1 tablet by mouth daily     atorvastatin 40 MG tablet  Commonly known as: LIPITOR  Take 1 tablet by mouth nightly     chlorthalidone 25 MG tablet  Commonly known as: HYGROTON  Take 1 tablet by mouth daily     clopidogrel 75 MG tablet  Commonly known as: PLAVIX  Take 1 tablet by mouth daily     furosemide 40 MG tablet  Commonly known as: Lasix  Take 1 tablet by mouth daily     insulin aspart 100 UNIT/ML injection vial  Commonly known as: NOVOLOG     insulin glargine 100 UNIT/ML injection vial  Commonly known as: LANTUS     linagliptin 5 MG tablet  Commonly known as: TRADJENTA  Take 1 tablet by mouth daily     metoprolol tartrate 25 MG tablet  Commonly known as: LOPRESSOR  Take 1 tablet by mouth 2 times daily     ondansetron 4 MG tablet  Commonly known as: Zofran  Take 1 tablet by mouth every 8 hours as needed for Nausea or Vomiting     sertraline 50 MG tablet  Commonly known as: ZOLOFT  Take 2 tablets by mouth daily             Objective Findings at Discharge:   BP (!) 101/52   Pulse 84   Temp 97.3 °F (36.3 °C)   Resp 17   Ht 5' 9\" (1.753 m)   Wt (!) 305 lb 1.9 oz (138.4 kg)   SpO2 100%   BMI 45.06 kg/m²       Physical Exam:   General: NAD  Eyes: EOMI  ENT: neck supple  Cardiovascular: Regular rate. Respiratory: Clear to auscultation  Gastrointestinal: Soft, non tender  Genitourinary: no suprapubic tenderness  Musculoskeletal: No edema  Skin: warm, dry  Neuro: Alert. Psych: Mood appropriate.          Labs and Imaging   Echocardiogram limited    Result The pulmonic valve was not well visualized. Pericardial Effusion  No evidence of any pericardial effusion. M-Mode/2D Measurements & Calculations   LV Diastolic Dimension:   LV Systolic Dimension:  3.92 cm                   3.41 cm  LV FS:31.5 %              LV Volume Diastolic: 506  LV PW Diastolic: 6.93 cm  ml                       RV Diastolic Dimension:  LV PW Systolic: 3.98 cm   LV Volume Systolic: 82   6.22 cm  Septum Diastolic: 0.12 cm ml  Septum Systolic: 4.38 cm  LV EDV/LV EDV Index: 163                            ml/65 m2LV ESV/LV ESV                            Index: 82 ml/33 m2  LV Area Diastolic: 23.5   EF Calculated (A4C):  cm2                       49.7 %  LV Area Systolic: 98.4    EF Calculated (2D): 59.1  cm2                       %                             LV Length: 8.6 cm      XR CHEST PORTABLE    Result Date: 7/25/2022  EXAMINATION: ONE XRAY VIEW OF THE CHEST 7/25/2022 2:06 pm COMPARISON: 07/24/2022 at 2028 hours HISTORY: ORDERING SYSTEM PROVIDED HISTORY: Post temporary HD cath insertion TECHNOLOGIST PROVIDED HISTORY: Reason for exam:->Post temporary HD cath insertion Reason for Exam: Post temporary HD cath insertion FINDINGS: Deferred as access catheter via right lower cervical approach with tip in the mid right atrium. No pneumothorax or detectable complication. Otherwise, cardiomegaly, mediastinal widening, diffuse bronchovascular marking prominence noted similar to the prior study suggesting early/mild congestive heart failure and/or low lung volumes. No pulmonary consolidations, detectable pleural effusions. Temper dialysis access catheter via right lower cervical approach with tip in the mid right atrium. No complication suggested. Underlying findings consistent with congestive heart failure/hypervolemia s/p or low lung volumes without detectable pleural effusion.      IR TUNNELED CVC PLACE WO SQ PORT/PUMP > 5 YEARS    Result Date: 7/27/2022  PROCEDURE: ULTRASOUND GUIDED VASCULAR ACCESS. FLUOROSCOPY GUIDED PLACEMENT OF A SUBCUTANEOUS PORT-A-CATH. 7/27/2022. HISTORY: ORDERING SYSTEM PROVIDED HISTORY: esrd TECHNOLOGIST PROVIDED HISTORY: Reason for exam:->esrd How many lumens are being requested?->2 What side should this line be placed?->Right What site is the preferred site? ->Internal Jugular SEDATION: None FLUOROSCOPY DOSE AND TYPE OR TIME AND EXPOSURES: 0.8 minutes fluoroscopy time AK: 37 mGy TECHNIQUE: Maximum sterile barrier technique including hand hygiene, skin prep and sterile ultrasound technique utilized for procedure. Sterile ultrasound technique also utilized for procedure Ultrasound guidance required for procedure to confirm target vessel patency, puncture site selection, real-time intra procedural guidance. Images made for patient's medical file. Informed consent was obtained after a detailed explanation of the procedure including risks, benefits, and alternatives. All aspects of maximum sterile barrier technique were used including washing hands with conventional soap and water or with alcohol-based hand rubs (ABHR), skin preparation, cap, mask, sterile gown, sterile gloves, and sterile full body drape. Local anesthesia was achieved with lidocaine. A micropuncture needle was used to access the right internal jugular vein using ultrasound guidance. An ultrasound image demonstrating patency of the vein with needle tip located within it. An image was obtained and stored in PACs. A 0.035 guidewire was used to place a peel-away sheath. Subcutaneous tunnel was created in the right clavicular region through which catheter was placed. Catheter was advanced to the peel-away sheath. Peel-away sheath removed. Catheter ports aspirated, flushed, capped and affixed to the patient's skin. Dressing applied. Patient tolerated procedure well. FINDINGS: Pre and intraprocedural images demonstrate patent right internal jugular vein with access needle seen within it.   Fluoroscopic image demonstrates the tip of the port at the mid-upper right atrium level. No complication suggested. .     Successful ultrasound and fluoroscopy guided Port-A-Cath/tunneled dialysis access catheter placement via right internal jugular venous approach. MRI BRAIN WO CONTRAST    Result Date: 7/30/2022  EXAMINATION: MRI OF THE BRAIN WITHOUT CONTRAST  7/30/2022 4:53 pm TECHNIQUE: Multiplanar multisequence MRI of the brain was performed without the administration of intravenous contrast. COMPARISON: None. HISTORY: ORDERING SYSTEM PROVIDED HISTORY: dizziness to r/o posterior stroke TECHNOLOGIST PROVIDED HISTORY: Reason for exam:->dizziness to r/o posterior stroke Reason for Exam: dizziness r/o posterior stroke FINDINGS: INTRACRANIAL STRUCTURES/VENTRICLES: Single punctate focus of T2/FLAIR hyperintensity in the posterior right frontal periventricular white matter is within normal limits for age. No acute infarct or mass. No mass effect or midline shift. No evidence of an acute intracranial hemorrhage. The ventricles and sulci are normal in size and configuration. The sellar/suprasellar regions appear unremarkable. The normal signal voids within the major intracranial vessels appear maintained. ORBITS: Orbital structures are unremarkable. SINUSES: Paranasal sinuses are well aerated. Trace bilateral mastoid effusions are present. BONES/SOFT TISSUES: The bone marrow signal intensity appears normal. The soft tissues demonstrate no acute abnormality. 1. No acute intracranial abnormality. 2. Trace bilateral mastoid effusions.      NM MYOCARDIAL SPECT REST EXERCISE OR RX    Result Date: 7/29/2022  Cardiac Perfusion Imaging   Demographics   Patient Name      Xavi Santana     Date of study        07/29/2022   Date of Birth     1975         Gender               Male   Age               52 year(s)         Race                    Patient Number    [id number]         Room Number          8327   Visit Number      [id number]          Height               69 inches   Corporate ID      [id number]           Weight               318 pounds   Accession Number  [id number]                                        NM Technologist      Adrian Duarte [facility]   Ordering          Morton Tam   Interpreting         Selene Turcios  Physician         Aman Mclaughlin MD      Cardiologist         Aman Mclaughlin MD   Conclusions   Summary  abnormal stress test due to depressed LVEF, normal perfusion, diaphragmatic  attenuation artifact noted   Recommendation  optimize medications, ECHO correlation for LVEF determination   Signatures   ------------------------------------------------------------------  Electronically signed by Emmy Glover MD  (Interpreting cardiologist) on 07/29/2022 at 15:31  ------------------------------------------------------------------  Procedure Procedure Type:   Nuclear Stress Test:Pharmacological, Myocardial Perfusion Imaging with  Pharm, NM MYOCARDIAL SPECT REST EXERCISE OR RX  Indications: Syncope. Risk Factors   The patient risk factors include:obesity, Current - Every day tobacco use,  hypercholesterolemia, hypertension, diabetes mellitus and chronic lung  disease. Stress Protocols   Resting ECG  Normal sinus rhythm. Resting HR:79 bpm  Resting BP:166/93 mmHg  Stress Protocol:Pharmacologic - Lexiscan  Peak HR:88 bpm                               HR/BP product:54795  Peak BP:183/100 mmHg  Predicted HR: 173 bpm  % of predicted HR: 51   Exercise duration: 01:04 min  Reason for termination:Completed   ECG Findings  nsr   Symptoms  Shortness of breath. Nausea. Stress Interpretation  ECG portion of stress test is negative for ischemia by diagnostic criteria. Procedure Medications   - Lexiscan I.V. bolus (over 15sec.) 0.4 mg admininstered @ 07/29/2022 08:20.   - Aminophylline I.V. 75 mg admininstered @ 07/29/2022 08:23.   Imaging Protocols   Rest                             Stress Isotope:Sestamibi 99mTc          Isotope: Sestamibi 99mTc  Isotope dose:10.7 mCi            Isotope dose:32.9 mCi  Administration route: I.V. Administration route: I.V. Injection Date:07/28/2022 09:05  Injection Date:07/29/2022 08:20  Scan Date:07/28/2022 09:50       Scan Date:07/29/2022 09:05   Technique:        SPECT          Technique:        Gated                                                     SPECT   Procedure Description   Upon patient arrival, the patient is identified using two identifiers and  the physician order is verified. An IV is established and 8-11mCi of 99mTc  Sestamibi is intravenously injected and followed with 10mL 0.9% Normal  Saline flush. A circulation period of 45 minutes occurs prior to resting  SPECT imaging. After imaging is complete the patient is escorted to the  stress lab. The patient is connected to the ECG and blood pressure is  measured. The RN starts the stress portion of the exam and rapidly  intravenously injects Lexiscan (regadenosine) 0.4mg over a period of 10 to15  seconds and follows with 5mL 0.9% Normal Saline flush. Immediately following  the Nuclear Technologist intravenously injects 22-33mCi of 99mTc Sestamibi  and 5mL 0.9% Normal Saline flush. After completion, recovery, and removal of  the IV, the patient rests during the second circulation period of 45  minutes. Final stress SPECT gated imaging is performed. The patient may  return home or to their room after stress imaging. The images are processed  and final charting is completed and sent to the appropriate cardiologist for  interpretation and reporting. Perfusion Interpretation   Normal tracer uptake in all myocardial segment during rest and stress. Decreased uptake inferiorly due to diaphragmatic artifact.   Imaging Results    Summed scores     - Summed stress score: 18     - Summed rest score: 11     - Summed difference score:    5   Rest ejection  Ejection fraction:36 %  EDV :126 ml  ESV :81 ml AMORPHOUS RARE 07/24/2022 11:40 PM     Urine Cultures: No results found for: LABURIN  Blood Cultures: No results found for: BC  No results found for: BLOODCULT2  Organism:   Lab Results   Component Value Date/Time    Health system 01/07/2019 12:08 AM       Time Spent Discharging patient  31 minutes    Electronically signed by Sravani Michelle MD on 8/1/2022 at 12:05 PM

## 2022-08-01 NOTE — CONSULTS
Initial Psychiatric History and Physical    Holland Pratt  [id number]  7/24/2022 08/01/22    ID: Patient is a 52 yrs y.o. male    CC:I would say I am depressed and frustrated    HPI: Patient is a 77-year-old male with a PMHx of CAD s/p PCI in 9/2021, HTN, HLD and T2DM who presented to the ED due to weakness. Patient reported that his weakness has been progressive for the past 3 weeks with generalized complaints including fatigue, midepigastric abdominal pain, weakness and decreased p.o. intake. Patient reported that he stood up earlier in the day prior to presentation and felt lightheaded and fell to the floor. At that time he had a large, sticky, dark bowel movement and was unconscious for about 30 seconds. Denied any seizure-like activity or confusion afterwards. Also reported that he has been having increased polyuria and polydipsia, and has not been able to keep up with fluid intake. Psychiatry consulted by Dr Bridgette Velasco due to \"depression, recommendation by neurology. \"      Per neurology- Leatha Davidson tells me that he stopped taking his medications because he is depressed. He states he lives in severe pain every day in his abdomen and his legs due to severe polyneuropathy. It is difficult for  him to ambulate in his home and he often is unable to complete simple tasks such as showering which further worsens his baseline depression. Met with patient at bedside. He is alert and oriented x 4. Patient denies SI/HI. Denies auditory and visual hallucinations. He does respond positive for bipolar s/s including hx of grandiosity, agitation x 1 week,  risky behaviors () spending money he does not have) and resistant depression. Patient is wanting to go home but states he knows \"I will continue with staying in bed, getting depressed, getting up and then going back to sleep. I have no purpose. \" He commented that he worked as a  and wants to go back to work. He has difficulty due to pain in legs.  States he has problems with sleeping at night: He cannot go to sleep without difficulty and has difficulty staying asleep. This could be if he is sleeping too much during the day or due to depression or mix of both. Support and encouragement provided. Past Psychiatric History:   Consults h/o 5/17/2021 for SI        Psychiatric medication hx  Abilify? Did not take it long enough to see if helpful  Gapapentin- Increased SI and agitation    Family Psychiatric History:   Family History   Problem Relation Age of Onset    Cancer Mother         ?site    Stroke Mother     Bleeding Prob Mother     Diabetes Father     Heart Disease Father     High Blood Pressure Father     Obesity Father     Kidney Disease Father     Diabetes Sister     High Blood Pressure Sister     Mental Illness Sister     Obesity Sister     High Blood Pressure Other     Mental Illness Other         bipolar    Other Son         cyst in ear canal    ADHD Daughter         Allergies:   Allergies   Allergen Reactions    Adhesive Tape Rash    Doxycycline Nausea And Vomiting    Reglan [Metoclopramide] Anxiety        OBJECTIVE  Vital Signs:  Vitals:    08/01/22 1115   BP: (!) 101/52   Pulse: 84   Resp: 17   Temp:    SpO2: 100%       Labs:  Recent Results (from the past 48 hour(s))   POCT Glucose    Collection Time: 07/30/22  5:30 PM   Result Value Ref Range    POC Glucose 131 (H) 70 - 99 MG/DL   POCT Glucose    Collection Time: 07/30/22  7:53 PM   Result Value Ref Range    POC Glucose 203 (H) 70 - 99 MG/DL   POCT Glucose    Collection Time: 07/30/22 10:24 PM   Result Value Ref Range    POC Glucose 165 (H) 70 - 99 MG/DL   CBC    Collection Time: 07/31/22  1:23 AM   Result Value Ref Range    WBC 8.8 4.0 - 10.5 K/CU MM    RBC 3.12 (L) 4.6 - 6.2 M/CU MM    Hemoglobin 9.2 (L) 13.5 - 18.0 GM/DL    Hematocrit 28.2 (L) 42 - 52 %    MCV 90.4 78 - 100 FL    MCH 29.5 27 - 31 PG    MCHC 32.6 32.0 - 36.0 %    RDW 13.1 11.7 - 14.9 %    Platelets 943 067 - 049 K/CU MM    MPV 10.3 7.5 - 11.1 FL   Comprehensive Metabolic Panel    Collection Time: 07/31/22  1:23 AM   Result Value Ref Range    Sodium 141 135 - 145 MMOL/L    Potassium 4.2 3.5 - 5.1 MMOL/L    Chloride 106 99 - 110 mMol/L    CO2 23 21 - 32 MMOL/L    BUN 49 (H) 6 - 23 MG/DL    Creatinine 6.1 (H) 0.9 - 1.3 MG/DL    Glucose 172 (H) 70 - 99 MG/DL    Calcium 7.6 (L) 8.3 - 10.6 MG/DL    Albumin 2.6 (L) 3.4 - 5.0 GM/DL    Total Protein 5.2 (L) 6.4 - 8.2 GM/DL    Total Bilirubin 0.2 0.0 - 1.0 MG/DL    ALT 12 10 - 40 U/L    AST 12 (L) 15 - 37 IU/L    Alkaline Phosphatase 80 40 - 128 IU/L    GFR Non-African American 10 (L) >60 mL/min/1.73m2    GFR  12 (L) >60 mL/min/1.73m2    Anion Gap 12 4 - 16   POCT Glucose    Collection Time: 07/31/22  3:03 AM   Result Value Ref Range    POC Glucose 155 (H) 70 - 99 MG/DL   POCT Glucose    Collection Time: 07/31/22  7:04 AM   Result Value Ref Range    POC Glucose 174 (H) 70 - 99 MG/DL   POCT Glucose    Collection Time: 07/31/22 11:12 AM   Result Value Ref Range    POC Glucose 177 (H) 70 - 99 MG/DL   POCT Glucose    Collection Time: 07/31/22  4:09 PM   Result Value Ref Range    POC Glucose 72 70 - 99 MG/DL   POCT Glucose    Collection Time: 07/31/22  8:17 PM   Result Value Ref Range    POC Glucose 175 (H) 70 - 99 MG/DL   POCT Glucose    Collection Time: 08/01/22  2:07 AM   Result Value Ref Range    POC Glucose 204 (H) 70 - 99 MG/DL   POCT Glucose    Collection Time: 08/01/22  6:23 AM   Result Value Ref Range    POC Glucose 137 (H) 70 - 99 MG/DL   Comprehensive Metabolic Panel    Collection Time: 08/01/22  6:50 AM   Result Value Ref Range    Sodium 144 135 - 145 MMOL/L    Potassium 4.1 3.5 - 5.1 MMOL/L    Chloride 108 99 - 110 mMol/L    CO2 24 21 - 32 MMOL/L    BUN 53 (H) 6 - 23 MG/DL    Creatinine 6.5 (H) 0.9 - 1.3 MG/DL    Glucose 145 (H) 70 - 99 MG/DL    Calcium 7.7 (L) 8.3 - 10.6 MG/DL    Albumin 2.8 (L) 3.4 - 5.0 GM/DL    Total Protein 5.4 (L) 6.4 - 8.2 GM/DL    Total Bilirubin 0.2 0.0 - 1.0 MG/DL    ALT 11 10 - 40 U/L    AST 11 (L) 15 - 37 IU/L    Alkaline Phosphatase 84 40 - 128 IU/L    GFR Non- 9 (L) >60 mL/min/1.73m2    GFR  11 (L) >60 mL/min/1.73m2    Anion Gap 12 4 - 16   POCT Glucose    Collection Time: 08/01/22 11:39 AM   Result Value Ref Range    POC Glucose 117 (H) 70 - 99 MG/DL       Review of Systems:  Reports of no current cardiovascular, respiratory, gastrointestinal, genitourinary, integumentary, neurological, muscuoskeletal, or immunological symptoms today. PSYCHIATRIC: See HPI above. PSYCHIATRIC EXAMINATION / MENTAL STATUS EXAM    CONSTITUTIONAL:    Vitals:   Vitals:    08/01/22 1115   BP: (!) 101/52   Pulse: 84   Resp: 17   Temp:    SpO2: 100%      General appearance: [x] appears age, []  appears older than stated age,               [x]  adequately dressed and groomed, [] disheveled,               [x]  in no acute distress, [] appears mildly distressed, [] other           MUSCULOSKELETAL:   Gait:   [] normal, [] antalgic, [] unsteady, [] gait not evaluated   Station:             [] erect, [x] sitting, [] recumbent, [] other        Strength/tone:  [x] muscle strength and tone appear consistent with age and                                        condition     [] atrophy      [] abnormal movements          Vitals: Blood pressure (!) 101/52, pulse 84, temperature 97.3 °F (36.3 °C), resp. rate 17, height 5' 9\" (1.753 m), weight (!) 305 lb 1.9 oz (138.4 kg), SpO2 100 %. CONSTITUTIONAL:    Appearance: appears stated age. alert and oriented to person, place, time & situation. no acute distress. Adequate grooming and hygeine. Good eye contact. No prominent physical abnormalities. Attitude: Manner is cooperative and pleasant  Motor: No psychomotor agitation, retardation or abnormal movements noted  Speech: Clearly articulated; normal rate, volume, tone & amount.   Language: intact understanding and production  Mood:depressed  Affect: flat  Thought Production: Spontaneous. Thought Form: Coherent, linear, logical & goal-directed. No tangentiality or circumstantiality. No flight of ideas or loosening of associations. Thought Content/Perceptions: No EVANGELISTA, no AVH, no delusion  Insight:fair  Judgment-adequate  Memory: Immediate, recent, and remote appear intact, though not formally tested. Attention: maintained throughout interview  Fund of knowledge: Average  Gait/Balance: DANILO    Impression:   Bipolar, depressed    Problem List:   Syncope    Plan:   Patient is ok to be dc from a psychiatric point of view when medically appropriate. Patient to follow up to manage depression with [facility] as an outpatient. Recommend ARU. Recommend continuing zoloft   Recommend abilify 5 mg po at bedtime  Recommend trazodone 50 mg po prn at bedtime prn for insomnia.   Psychiatry will follow loosely but please call if required  Thank you for this consult  Spoke to Dr Kimberlee De La Cruz regarding recommendations    Electronically signed by APPLE Danielle CNP on 8/1/2022 at 1:02 PM

## 2022-08-02 LAB
GLUCOSE BLD-MCNC: 126 MG/DL (ref 70–99)
GLUCOSE BLD-MCNC: 159 MG/DL (ref 70–99)
GLUCOSE BLD-MCNC: 181 MG/DL (ref 70–99)
GLUCOSE BLD-MCNC: 216 MG/DL (ref 70–99)
GLUCOSE BLD-MCNC: 250 MG/DL (ref 70–99)

## 2022-08-02 PROCEDURE — 6370000000 HC RX 637 (ALT 250 FOR IP): Performed by: INTERNAL MEDICINE

## 2022-08-02 PROCEDURE — 6370000000 HC RX 637 (ALT 250 FOR IP): Performed by: HOSPITALIST

## 2022-08-02 PROCEDURE — 82962 GLUCOSE BLOOD TEST: CPT

## 2022-08-02 PROCEDURE — 94761 N-INVAS EAR/PLS OXIMETRY MLT: CPT

## 2022-08-02 PROCEDURE — 1200000000 HC SEMI PRIVATE

## 2022-08-02 PROCEDURE — 6370000000 HC RX 637 (ALT 250 FOR IP): Performed by: NURSE PRACTITIONER

## 2022-08-02 PROCEDURE — 6370000000 HC RX 637 (ALT 250 FOR IP): Performed by: STUDENT IN AN ORGANIZED HEALTH CARE EDUCATION/TRAINING PROGRAM

## 2022-08-02 PROCEDURE — 2580000003 HC RX 258: Performed by: INTERNAL MEDICINE

## 2022-08-02 RX ORDER — INSULIN GLARGINE 100 [IU]/ML
45 INJECTION, SOLUTION SUBCUTANEOUS NIGHTLY
Status: DISCONTINUED | OUTPATIENT
Start: 2022-08-02 | End: 2022-08-03 | Stop reason: HOSPADM

## 2022-08-02 RX ADMIN — TRAZODONE HYDROCHLORIDE 50 MG: 50 TABLET ORAL at 22:15

## 2022-08-02 RX ADMIN — INSULIN LISPRO 20 UNITS: 100 INJECTION, SOLUTION INTRAVENOUS; SUBCUTANEOUS at 08:25

## 2022-08-02 RX ADMIN — CLOPIDOGREL BISULFATE 75 MG: 75 TABLET ORAL at 08:21

## 2022-08-02 RX ADMIN — ALOGLIPTIN 6.25 MG: 12.5 TABLET, FILM COATED ORAL at 08:21

## 2022-08-02 RX ADMIN — CALCITRIOL CAPSULES 0.25 MCG 0.25 MCG: 0.25 CAPSULE ORAL at 08:21

## 2022-08-02 RX ADMIN — ASPIRIN 81 MG CHEWABLE TABLET 81 MG: 81 TABLET CHEWABLE at 08:21

## 2022-08-02 RX ADMIN — SUCRALFATE 1 G: 1 TABLET ORAL at 05:51

## 2022-08-02 RX ADMIN — INSULIN LISPRO 4 UNITS: 100 INJECTION, SOLUTION INTRAVENOUS; SUBCUTANEOUS at 03:26

## 2022-08-02 RX ADMIN — PANTOPRAZOLE SODIUM 40 MG: 40 TABLET, DELAYED RELEASE ORAL at 16:42

## 2022-08-02 RX ADMIN — ARIPIPRAZOLE 5 MG: 10 TABLET ORAL at 22:15

## 2022-08-02 RX ADMIN — Medication 10 ML: at 08:22

## 2022-08-02 RX ADMIN — SUCRALFATE 1 G: 1 TABLET ORAL at 16:42

## 2022-08-02 RX ADMIN — ATORVASTATIN CALCIUM 40 MG: 40 TABLET, FILM COATED ORAL at 22:15

## 2022-08-02 RX ADMIN — SERTRALINE HYDROCHLORIDE 100 MG: 50 TABLET ORAL at 08:21

## 2022-08-02 RX ADMIN — INSULIN LISPRO 20 UNITS: 100 INJECTION, SOLUTION INTRAVENOUS; SUBCUTANEOUS at 17:45

## 2022-08-02 RX ADMIN — INSULIN GLARGINE 45 UNITS: 100 INJECTION, SOLUTION SUBCUTANEOUS at 22:16

## 2022-08-02 RX ADMIN — PANTOPRAZOLE SODIUM 40 MG: 40 TABLET, DELAYED RELEASE ORAL at 05:50

## 2022-08-02 RX ADMIN — OXYCODONE HYDROCHLORIDE 5 MG: 5 TABLET ORAL at 08:22

## 2022-08-02 RX ADMIN — INSULIN LISPRO 20 UNITS: 100 INJECTION, SOLUTION INTRAVENOUS; SUBCUTANEOUS at 12:25

## 2022-08-02 RX ADMIN — OXYCODONE HYDROCHLORIDE 5 MG: 5 TABLET ORAL at 16:48

## 2022-08-02 RX ADMIN — INSULIN LISPRO 2 UNITS: 100 INJECTION, SOLUTION INTRAVENOUS; SUBCUTANEOUS at 08:25

## 2022-08-02 ASSESSMENT — PAIN DESCRIPTION - DESCRIPTORS
DESCRIPTORS: ACHING;BURNING;DISCOMFORT
DESCRIPTORS: BURNING
DESCRIPTORS: ACHING;BURNING;DISCOMFORT

## 2022-08-02 ASSESSMENT — PAIN SCALES - WONG BAKER
WONGBAKER_NUMERICALRESPONSE: 0

## 2022-08-02 ASSESSMENT — PAIN SCALES - GENERAL
PAINLEVEL_OUTOF10: 6

## 2022-08-02 ASSESSMENT — PAIN DESCRIPTION - PAIN TYPE: TYPE: ACUTE PAIN

## 2022-08-02 ASSESSMENT — PAIN DESCRIPTION - LOCATION
LOCATION: CHEST
LOCATION: NECK
LOCATION: NECK;CHEST

## 2022-08-02 ASSESSMENT — PAIN - FUNCTIONAL ASSESSMENT: PAIN_FUNCTIONAL_ASSESSMENT: ACTIVITIES ARE NOT PREVENTED

## 2022-08-02 NOTE — CARE COORDINATION
Met with patient to discuss ARU. Per patient he states he prefers to go home. Explained the benefits of ARU. Patient still politely declines admission to ARU. Left  for Anthony BILLY regarding above information.

## 2022-08-02 NOTE — PROGRESS NOTES
Progress Note( Dr. Pardo Senior)  8/2/2022  Subjective:   Admit Date: 7/24/2022  PCP: Colleen Flower MD    Admitted For :Generalized weakness and syncope       Consulted For:  Better control of blood glucose    Interval History: Had EGD done this morning shows  ) Sandra-Roberson tear , no active bleed at this point                2) normal stomach, normal esophagus                3) normal duodenum  Cardiac stress test negative for any CAD  Patient unable to do tilt test  With Florinef on board patient's blood pressures seem to be somewhat better with less postural hypotension      Denies any chest pains,   Denies SOB . Denies nausea or vomiting. No new bowel or bladder symptoms. Intake/Output Summary (Last 24 hours) at 8/2/2022 1920  Last data filed at 8/2/2022 1822  Gross per 24 hour   Intake 720 ml   Output 1300 ml   Net -580 ml         DATA    CBC:   Recent Labs     07/31/22  0123 08/01/22  0650   WBC 8.8 7.8   HGB 9.2* 9.4*    230      CMP:  Recent Labs     07/31/22  0123 08/01/22  0650    144   K 4.2 4.1    108   CO2 23 24   BUN 49* 53*   CREATININE 6.1* 6.5*   CALCIUM 7.6* 7.7*   PROT 5.2* 5.4*   LABALBU 2.6* 2.8*   BILITOT 0.2 0.2   ALKPHOS 80 84   AST 12* 11*   ALT 12 11       Lipids:   Lab Results   Component Value Date/Time    CHOL 145 05/13/2021 10:28 AM    CHOL 168 10/15/2013 10:30 AM    HDL 29 05/13/2021 10:28 AM    TRIG 188 05/13/2021 10:28 AM     Glucose:  Recent Labs     08/02/22  0651 08/02/22  1057 08/02/22  1601   POCGLU 216* 181* 126*       [id number]:  Lab Results   Component Value Date/Time    LABA1C 10.0 07/25/2022 06:50 AM     High Sensitivity TSH:   Lab Results   Component Value Date/Time    TSHHS 3.860 07/25/2022 06:50 AM     Free T3: No results found for: FT3  Free T4:  Lab Results   Component Value Date/Time    T4FREE 1.25 07/24/2017 11:10 AM       MRI BRAIN WO CONTRAST   Final Result   1. No acute intracranial abnormality.    2. Trace bilateral mastoid effusions. NM MYOCARDIAL SPECT REST EXERCISE OR RX   Final Result      IR TUNNELED CVC PLACE WO SQ PORT/PUMP > 5 YEARS   Final Result   Successful ultrasound and fluoroscopy guided Port-A-Cath/tunneled dialysis   access catheter placement via right internal jugular venous approach. XR CHEST PORTABLE   Final Result   Temper dialysis access catheter via right lower cervical approach with tip in   the mid right atrium. No complication suggested. Underlying findings consistent with congestive heart failure/hypervolemia s/p   or low lung volumes without detectable pleural effusion. US RETROPERITONEAL LIMITED   Final Result   Unremarkable ultrasound of the kidneys. CT ABDOMEN PELVIS WO CONTRAST Additional Contrast? None   Final Result   No acute process in the abdomen or pelvis. CT CERVICAL SPINE WO CONTRAST   Final Result   No acute abnormality of the cervical spine. CT HEAD WO CONTRAST   Final Result   No acute intracranial abnormality. XR CHEST PORTABLE   Final Result   No acute cardiopulmonary abnormality.          IR NONTUNNELED VASCULAR CATHETER > 5 YEARS    (Results Pending)        Scheduled Medicines   Medications:    insulin glargine  45 Units SubCUTAneous Nightly    ARIPiprazole  5 mg Oral Nightly    pantoprazole  40 mg Oral BID AC    clopidogrel  75 mg Oral Daily    aspirin  81 mg Oral Daily    alogliptin  6.25 mg Oral Daily    insulin lispro  20 Units SubCUTAneous TID WC    insulin lispro  0-8 Units SubCUTAneous TID WC    insulin lispro  0-8 Units SubCUTAneous 2 times per day    sucralfate  1 g Oral BID AC    atorvastatin  40 mg Oral Nightly    sertraline  100 mg Oral Daily    sodium chloride flush  5-40 mL IntraVENous 2 times per day    calcitRIOL  0.25 mcg Oral Daily    epoetin paola-epbx  10,000 Units IntraVENous Once per day on Mon Wed Fri      Infusions:    dextrose      sodium chloride 20 mL/hr at 07/26/22 0802         Objective:   Vitals: BP syndrome     Generalized edema     Stage 3b chronic kidney disease ([facility] [address]     Diabetic nephropathy associated with type 2 diabetes mellitus (HCC)     Hypoalbuminemia     Chronic kidney disease, stage IV (severe) ([facility])     FH: CAD (coronary artery disease)     ASCVD (arteriosclerotic cardiovascular disease)     Other proteinuria     Chest pain     Surgical wound dehiscence     CARMEN (acute kidney injury) ([facility] [address]     Anemia      Plan:     Reviewed POC blood glucose . Labs and X ray results   Reviewed Current Medicines   On meal/ Correction bolus Humalog/ Basal Lantus Insulin regime  Monitor Blood glucose frequently   Modified  the dose of Insulin/ other medicines as needed   Now patient is on scheduled hemodialysis   's postural hypotension, hospital started on Florinef  Will follow     .      Yousif Evans MD, MD

## 2022-08-02 NOTE — PROGRESS NOTES
Nephrology Progress Note        [address], [address], [place] [address]  Phone: [phone]  Office Hours: 8:30AM - 4:30PM  Monday - Friday 8/2/2022 8:48 AM  Subjective:   Admit Date: 7/24/2022  PCP: Yung Yanez MD  Interval History: on room air  Doing better  Eager to go home    Diet: ADULT DIET; Regular; 4 carb choices (60 gm/meal); Low Potassium (Less than 3000 mg/day); 2000 ml  ADULT ORAL NUTRITION SUPPLEMENT; Lunch; Renal Oral Supplement      Data:   Scheduled Meds:   insulin glargine  45 Units SubCUTAneous Nightly    ARIPiprazole  5 mg Oral Nightly    pantoprazole  40 mg Oral BID AC    clopidogrel  75 mg Oral Daily    aspirin  81 mg Oral Daily    alogliptin  6.25 mg Oral Daily    insulin lispro  20 Units SubCUTAneous TID WC    insulin lispro  0-8 Units SubCUTAneous TID WC    insulin lispro  0-8 Units SubCUTAneous 2 times per day    sucralfate  1 g Oral BID AC    atorvastatin  40 mg Oral Nightly    sertraline  100 mg Oral Daily    sodium chloride flush  5-40 mL IntraVENous 2 times per day    calcitRIOL  0.25 mcg Oral Daily    epoetin paola-epbx  10,000 Units IntraVENous Once per day on Mon Wed Fri     Continuous Infusions:   dextrose      sodium chloride 20 mL/hr at 07/26/22 0802     PRN Meds:traZODone, oxyCODONE, glucose, dextrose bolus **OR** dextrose bolus, glucagon (rDNA), dextrose, sodium chloride flush, sodium chloride, ondansetron **OR** ondansetron, polyethylene glycol, acetaminophen **OR** acetaminophen  I/O last 3 completed shifts: In: 740 [P.O.:240]  Out: 5000 [Urine:2500]  No intake/output data recorded.     Intake/Output Summary (Last 24 hours) at 8/2/2022 0848  Last data filed at 8/2/2022 0152  Gross per 24 hour   Intake 500 ml   Output 3400 ml   Net -2900 ml       CBC:   Recent Labs     07/31/22  0123 08/01/22  0650   WBC 8.8 7.8   HGB 9.2* 9.4*    230       BMP:    Recent Labs     07/31/22  0123 08/01/22  0650    144   K 4.2 4.1    108   CO2 23 24   BUN 49* 53*   CREATININE 6.1* 6.5*   GLUCOSE 172* 145*     Hepatic:   Recent Labs     07/31/22  0123 08/01/22  0650   AST 12* 11*   ALT 12 11   BILITOT 0.2 0.2   ALKPHOS 80 84     Troponin: No results for input(s): TROPONINI in the last 72 hours. BNP: No results for input(s): BNP in the last 72 hours. Lipids: No results for input(s): CHOL, HDL in the last 72 hours. Invalid input(s): LDLCALCU  ABGs:   Lab Results   Component Value Date/Time    PO2ART 119 06/18/2013 01:15 PM    [id number] 47.0 06/18/2013 01:15 PM     INR: No results for input(s): INR in the last 72 hours.     Objective:   Vitals: /89   Pulse 86   Temp 98.1 °F (36.7 °C) (Oral)   Resp 17   Ht 5' 9\" (1.753 m)   Wt (!) 307 lb 12.2 oz (139.6 kg)   SpO2 97%   BMI 45.45 kg/m²   General appearance: alert and cooperative with exam, in no acute distress  HEENT: normocephalic, atraumatic,   Neck: supple, trachea midline  Lungs: clear to auscultation bilaterally, breathing comfortably on room air  Heart[de-identified] regular rate and rhythm, S1, S2 normal,  Abdomen: soft, non-tender; non distended, +bowel sounds  Extremities: extremities atraumatic, no cyanosis or edema  Neurologic: alert, oriented, follows commands, interactive    Assessment and Plan:       Patient Active Problem List    Diagnosis Date Noted    CARMEN (acute kidney injury) ([facility] [address] 07/25/2022    Anemia 07/25/2022    Recurrent major depressive disorder, in full remission ([facility] [address] 05/18/2021    Generalized anxiety disorder 05/18/2021    Surgical wound dehiscence 02/08/2022    Chest pain 12/21/2021    Other proteinuria     FH: CAD (coronary artery disease) 09/29/2021    ASCVD (arteriosclerotic cardiovascular disease) 09/29/2021    Chronic kidney disease, stage IV (severe) ([facility] [address] 09/28/2021    Nephrotic syndrome 09/22/2021    Generalized edema 09/22/2021    Stage [facility]b [facility] 09/22/2021    Diabetic nephropathy associated with type 2 diabetes mellitus ([facility] 09/22/2021 Hypoalbuminemia 09/22/2021    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 type 2 diabetes mellitus, with muscle involvement without evidence of necrosis ([address] 09/21/2021    Right groin wound 06/09/2021    Ulcer of right groin with fat layer exposed ([address]     Syncope 06/05/2021    Necrotizing fasciitis ([address] 05/24/2021    Morbid obesity with body mass index (BMI) of 40.0 to 44.9 in adult [facility]) 05/21/2021    Irene gangrene     Cellulitis, scrotum 05/13/2021    Acute epididymitis     Cellulitis of left arm 04/03/2021    Cellulitis 03/23/2021    Acute renal failure (ARF) ([address] 08/04/2019    Diabetic foot infection ([address] 03/21/2019    Intractable nausea and vomiting 01/11/2019    Uncontrolled type 2 diabetes mellitus ([address] 01/11/2019    Nausea and vomiting 01/11/2019    Constipation 10/07/2018    Cellulitis of left foot     Sepsis ([address] 06/34/1262    Periumbilical hernia     Abscess 12/03/2017    Osteomyelitis ([address] 05/22/2015    Bronchitis 10/15/2013    Hyperlipidemia with target LDL less than 100 07/15/2013    Essential hypertension 07/15/2013    Bilateral carpal tunnel syndrome- left worse than right 06/24/2013    DANIELA (obstructive sleep apnea) 06/20/2013    Urinary frequency 06/20/2013    Neck pain 05/16/2013    Anxiety associated with depression     Panic attack 02/01/2012    Diabetic neuropathy ([address] 12/22/2011    Hiatal hernia 02/04/2011    Diabetes mellitus type 2, improved controlled 02/04/2011     Ok to dc per renal stdpt  Outpt HD on MWF is already set up  Continue calcitriol on dc  Next HD tomorrow    Thank you                Electronically signed by Nilam Begum DO on 8/2/2022 at 8:48 Larina Grise, MD Valentino Blessing, DO  [address],  [address]  [facility], [place] [address]  PHONE: [phone]  FAX: [phone]

## 2022-08-03 VITALS
SYSTOLIC BLOOD PRESSURE: 149 MMHG | OXYGEN SATURATION: 96 % | HEART RATE: 78 BPM | WEIGHT: 304.24 LBS | RESPIRATION RATE: 17 BRPM | TEMPERATURE: 98 F | HEIGHT: 69 IN | DIASTOLIC BLOOD PRESSURE: 95 MMHG | BODY MASS INDEX: 45.06 KG/M2

## 2022-08-03 LAB
GLUCOSE BLD-MCNC: 161 MG/DL (ref 70–99)
GLUCOSE BLD-MCNC: 171 MG/DL (ref 70–99)
GLUCOSE BLD-MCNC: 231 MG/DL (ref 70–99)

## 2022-08-03 PROCEDURE — 6370000000 HC RX 637 (ALT 250 FOR IP): Performed by: INTERNAL MEDICINE

## 2022-08-03 PROCEDURE — 6370000000 HC RX 637 (ALT 250 FOR IP): Performed by: STUDENT IN AN ORGANIZED HEALTH CARE EDUCATION/TRAINING PROGRAM

## 2022-08-03 PROCEDURE — 90935 HEMODIALYSIS ONE EVALUATION: CPT

## 2022-08-03 PROCEDURE — 6370000000 HC RX 637 (ALT 250 FOR IP): Performed by: NURSE PRACTITIONER

## 2022-08-03 PROCEDURE — 6360000002 HC RX W HCPCS: Performed by: INTERNAL MEDICINE

## 2022-08-03 PROCEDURE — 82962 GLUCOSE BLOOD TEST: CPT

## 2022-08-03 RX ORDER — INSULIN GLARGINE 100 [IU]/ML
45 INJECTION, SOLUTION SUBCUTANEOUS NIGHTLY
Qty: 5 PEN | Refills: 0 | Status: ON HOLD | OUTPATIENT
Start: 2022-08-03 | End: 2023-02-24 | Stop reason: SDUPTHER

## 2022-08-03 RX ORDER — ARIPIPRAZOLE 5 MG/1
5 TABLET ORAL NIGHTLY
Qty: 30 TABLET | Refills: 0 | Status: SHIPPED | OUTPATIENT
Start: 2022-08-03

## 2022-08-03 RX ORDER — SUCRALFATE 1 G/1
1 TABLET ORAL
Qty: 120 TABLET | Refills: 3 | Status: ON HOLD
Start: 2022-08-03 | End: 2022-11-29 | Stop reason: HOSPADM

## 2022-08-03 RX ORDER — INSULIN LISPRO 100 [IU]/ML
20 INJECTION, SOLUTION INTRAVENOUS; SUBCUTANEOUS
Qty: 5 PEN | Refills: 0 | Status: ON HOLD | OUTPATIENT
Start: 2022-08-03 | End: 2023-02-24 | Stop reason: SDUPTHER

## 2022-08-03 RX ORDER — PANTOPRAZOLE SODIUM 40 MG/1
40 TABLET, DELAYED RELEASE ORAL
Qty: 30 TABLET | Refills: 0 | Status: SHIPPED | OUTPATIENT
Start: 2022-08-03 | End: 2023-02-22

## 2022-08-03 RX ORDER — ALOGLIPTIN 6.25 MG/1
6.25 TABLET, FILM COATED ORAL DAILY
Qty: 30 TABLET | Refills: 0 | Status: SHIPPED | OUTPATIENT
Start: 2022-08-04 | End: 2023-02-22

## 2022-08-03 RX ORDER — TRAZODONE HYDROCHLORIDE 50 MG/1
50 TABLET ORAL NIGHTLY PRN
Qty: 30 TABLET | Refills: 0 | Status: ON HOLD | OUTPATIENT
Start: 2022-08-03 | End: 2022-11-29 | Stop reason: SDUPTHER

## 2022-08-03 RX ORDER — CALCITRIOL 0.25 UG/1
0.25 CAPSULE, LIQUID FILLED ORAL DAILY
Qty: 30 CAPSULE | Refills: 0 | Status: SHIPPED | OUTPATIENT
Start: 2022-08-04 | End: 2023-02-22

## 2022-08-03 RX ADMIN — INSULIN LISPRO 20 UNITS: 100 INJECTION, SOLUTION INTRAVENOUS; SUBCUTANEOUS at 13:22

## 2022-08-03 RX ADMIN — SUCRALFATE 1 G: 1 TABLET ORAL at 06:34

## 2022-08-03 RX ADMIN — PANTOPRAZOLE SODIUM 40 MG: 40 TABLET, DELAYED RELEASE ORAL at 06:34

## 2022-08-03 RX ADMIN — INSULIN LISPRO 2 UNITS: 100 INJECTION, SOLUTION INTRAVENOUS; SUBCUTANEOUS at 02:33

## 2022-08-03 RX ADMIN — OXYCODONE HYDROCHLORIDE 5 MG: 5 TABLET ORAL at 06:35

## 2022-08-03 RX ADMIN — EPOETIN ALFA-EPBX 10000 UNITS: 10000 INJECTION, SOLUTION INTRAVENOUS; SUBCUTANEOUS at 09:36

## 2022-08-03 ASSESSMENT — PAIN DESCRIPTION - LOCATION
LOCATION: NECK
LOCATION: NECK

## 2022-08-03 ASSESSMENT — PAIN - FUNCTIONAL ASSESSMENT: PAIN_FUNCTIONAL_ASSESSMENT: ACTIVITIES ARE NOT PREVENTED

## 2022-08-03 ASSESSMENT — PAIN SCALES - WONG BAKER
WONGBAKER_NUMERICALRESPONSE: 0

## 2022-08-03 ASSESSMENT — PAIN DESCRIPTION - DESCRIPTORS
DESCRIPTORS: BURNING
DESCRIPTORS: BURNING

## 2022-08-03 ASSESSMENT — PAIN SCALES - GENERAL
PAINLEVEL_OUTOF10: 7
PAINLEVEL_OUTOF10: 6

## 2022-08-03 ASSESSMENT — PAIN DESCRIPTION - PAIN TYPE: TYPE: ACUTE PAIN

## 2022-08-03 NOTE — PROGRESS NOTES
Patient tolerated 3 hour hemodialysis treatment well today. 1.5L of fluid was removed via HD. CVC access in right subclavian was accessed without issue. Retacrit 10k was administered during HD. Patient had no complaints of distress or pain. At the end of txt blood was rinsed back to pt successfully. CVC lines were flushed and locked with saline, then capped. HD txt overseen by López Casey RN    Patient Name: Shama Bhatt  Patient : 1975  MRN: [id number]     Acct: [de-identified]  Date of Admission: 2022  Room/Bed: 1119/1119-A  Code Status:  Full Code  Allergies:    Allergies   Allergen Reactions    Adhesive Tape Rash    Doxycycline Nausea And Vomiting    Reglan [Metoclopramide] Anxiety     Diagnosis:    Patient Active Problem List   Diagnosis    Hiatal hernia    Diabetes mellitus type 2, improved controlled    Diabetic neuropathy (HCC)    Panic attack    Anxiety associated with depression    Neck pain    DANIELA (obstructive sleep apnea)    Urinary frequency    Bilateral carpal tunnel syndrome- left worse than right    Hyperlipidemia with target LDL less than 100    Essential hypertension    Bronchitis    Osteomyelitis (HCC)    Abscess    Periumbilical hernia    Sepsis (HCC)    Cellulitis of left foot    Constipation    Intractable nausea and vomiting    Uncontrolled type 2 diabetes mellitus (HCC)    Nausea and vomiting    Diabetic foot infection (HCC)    Acute renal failure (ARF) (HCC)    Cellulitis    Cellulitis of left arm    Cellulitis, scrotum    Acute epididymitis    Irene gangrene    Recurrent major depressive disorder, in full remission ([address]    Generalized anxiety disorder    Morbid obesity with body mass index (BMI) of 40.0 to 44.9 in adult [facility])    Necrotizing fasciitis (HCC)    Syncope    Right groin wound    Ulcer of right groin with fat layer exposed ([address]    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 type 2 diabetes mellitus, with muscle involvement without evidence of necrosis (HCC)    Nephrotic syndrome    Generalized edema    Stage 3b chronic kidney disease ([facility]    Diabetic nephropathy associated with type 2 diabetes mellitus (HCC)    Hypoalbuminemia    Chronic kidney disease, stage IV (severe) ([facility])    FH: CAD (coronary artery disease)    ASCVD (arteriosclerotic cardiovascular disease)    Other proteinuria    Chest pain    Surgical wound dehiscence    CARMEN (acute kidney injury) ([facility]    Anemia         Treatment:  Hemodilaysis 2:1  Priority: Routine  Location: Acute Room    Diabetic: Yes  NPO: No  Isolation Precautions: None     Consent for Treatment Verified: Yes  Blood Consent Verified: Not Applicable     Safety Verified: Identify (I), Consent (C), Equipment (E), HepB Status (B), Orders Complete (O), Access Verified (A), and Timeliness (T)  Time out performed prior to access at 0845 hours. Report Received from Primary RN at 0731 hours. Primary RN (First Initial, Last Name, Title): Escobar Roman RN  Incapacitated Nurse Education Completed: Not Applicable     HBsAg ONLY:  Date Drawn: July 25, 2022       Results: Negative  HBsAb:  Date Drawn:  July 25, 2022       Results: Susceptible <10    Order  Dialysis Bath  K+ (Potassium): 2  Ca+ (Calcium): 3 (3.5)  Na+ (Sodium): 138  HCO3 (Bicarb): 30  Bicarbonate Concentrate Lot No.: [id number]  Acid Concentrate Lot No.: [id number]     Na+ Modeling: Not Applicable  Dialyzer: [id number]  Dialysate Temperature (C):  35  Blood Flow Rate (BFR):  250   Dialysate Flow Rate (DFR):   600        Access to be Utilized   Access:  Tunneled Catheter  Location: Subclavian  Side: Right   Needle gauge:  Not Applicable  + Bruit/Thrill: Not Applicable    First Use X-ray Verified: Yes  OK to use line order: Yes    Site Assessment:  Signs and Symptoms of Infection/Inflammation: None  If yes: Not Applicable  Dressing: Dry and Intact  Site Prep: Medical Aseptic Technique  Dressing Changed this Treatment: No  If yes, by whom: NA - not changed today  Date of Last Dressing Change: August 1, 2022  Antimicrobial Patch in place?: Yes  Red Alcohol Caps in place?: Yes  Gauze Dressing?: No  Non Dialysis Use?: No  Comment:    Flows: Good, Patent  If access problem, who was notified:     Pre and Post-Assessment  Patient Vitals for the past 8 hrs:   Level of Consciousness O2 Device Pain Level Response to Pain Intervention   08/03/22 0635 -- -- 7 --   08/03/22 0637 -- -- -- Patient satisfied   08/03/22 0727 -- None (Room air) -- Patient satisfied   08/03/22 0800 Alert (0) -- -- Patient satisfied   08/03/22 1403 -- -- -- Patient satisfied     Labs  Recent Labs     08/01/22  0650   WBC 7.8   HGB 9.4*   HCT 29.2*                                                                     Recent Labs     08/01/22  0650      K 4.1      CO2 24   BUN 53*   CREATININE 6.5*   GLUCOSE 145*     IV Drips and Rate/Dose   dextrose      sodium chloride Stopped (08/03/22 1341)      Safety - Before each treatment:   Dialysis Machine No.: [id number]   Machine Number: [id number]  Dialyzer Lot No.: [id number]  RO Machine Log Sheet Completed: Yes  Machine Alarm Self Test: Completed; Passed (08/03/22 0845)     Air Foam Detector: Tested, Proper Function  Extracorporeal Circuit Tested for Integrity: Yes  Machine Conductivity: 14  Manual Conductivity: 14.1  Machine Ph: 7.3  Bicarbonate Concentrate Lot No.: [id number]  Acid Concentrate Lot No.: [id number]  Manual Ph: 7.4  Bleach Test (Neg): Yes  Bath Temperature: 95 °F (35 °C)  Tubing Lot#: [id number]  Conductivity Meter Serial #: [id number]  All Connections Secure?: Yes  Venous Parameters Set?: Yes  Arterial Parameters Set?: Yes  Saline Line Double Clamped?: Yes  Air Foam Detector Engaged?: Yes  Machine Functioning Alarm Free?  Yes  Prime Given: 200ml    Chlorine Testing - Before each treatment and every 4 hours:   Treatment  Treatment Number: 5  Time On: 0900  Time Off: 1203  Treatment Goal: 1.5  Weight: (!) 304 lb 3.8 oz (138 kg) (08/03/22 1203)  1st check: less than 0.1 ppm at: 0650 hours  2nd check: less than 0.1 ppm at: 1020 hours  3rd check: Not Applicable  (if greater than 0.1 ppm, then check every 30 minutes from secondary)    Access Flows and Pressures  Patient Vitals for the past 8 hrs:   Blood Flow Rate (ml/min) Ultrafiltration Rate (ml/hr) Arterial Pressure (mmHg) Venous Pressure (mmHg) TMP DFR Comments Access Visible   08/03/22 0900 250 ml/min 670 ml/hr -60 mmHg 90 30 600 txt started Yes   08/03/22 0915 250 ml/min 670 ml/hr -90 mmHg 80 30 600 resting with eyes clsoed Yes   08/03/22 0930 250 ml/min 670 ml/hr -60 mmHg 90 40 600 no distress Yes   08/03/22 0945 250 ml/min 670 ml/hr -70 mmHg 90 40 600 RESTING WITH EYES CLOSED Yes   08/03/22 1000 250 ml/min 670 ml/hr -70 mmHg 90 40 600 lines secure Yes   08/03/22 1015 250 ml/min 670 ml/hr -70 mmHg 90 40 600 denies needs Yes   08/03/22 1030 250 ml/min 670 ml/hr -70 mmHg 110 80 600 RESTING WITH EYES CLOSED Yes   08/03/22 1045 300 ml/min 670 ml/hr -90 mmHg 110 40 600 no change in condition Yes   08/03/22 1100 300 ml/min 670 ml/hr -90 mmHg 120 40 600 no complaints Yes   08/03/22 1115 300 ml/min 670 ml/hr -90 mmHg 120 40 600 on phone Yes   08/03/22 1130 300 ml/min 670 ml/hr -90 mmHg 110 40 600 no change in condtion Yes   08/03/22 1145 300 ml/min 670 ml/hr -90 mmHg 120 40 600 resting Yes   08/03/22 1203 300 ml/min -- -- -- -- -- txt complete --     Vital Signs  Patient Vitals for the past 8 hrs:   BP Temp Pulse Resp SpO2 Weight Weight Method Percent Weight Change   08/03/22 0635 -- -- -- 18 -- -- -- --   08/03/22 0727 (!) 140/70 98.1 °F (36.7 °C) 85 19 93 % -- -- --   08/03/22 0900 (!) 185/93 97.1 °F (36.2 °C) 86 12 96 % (!) 310 lb 10.1 oz (140.9 kg) Bed scale 0.64   08/03/22 0915 (!) 167/87 -- 85 -- -- -- -- --   08/03/22 0930 (!) 168/80 -- 85 -- -- -- -- --   08/03/22 0945 121/74 -- 82 14 -- -- -- --   08/03/22 1000 125/71 -- 82 -- -- -- -- --   08/03/22 1015 115/64 -- 82 -- -- -- -- --   08/03/22 1030 121/63 -- 84 14 -- -- -- -- 08/03/22 1045 138/76 -- 85 -- -- -- -- --   08/03/22 1100 133/80 -- 85 -- -- -- -- --   08/03/22 1115 132/78 -- 84 -- -- -- -- --   08/03/22 1130 124/82 -- 82 -- -- -- -- --   08/03/22 1145 133/84 -- 80 -- -- -- -- --   08/03/22 1203 (!) 149/95 98 °F (36.7 °C) 78 17 -- (!) 304 lb 3.8 oz (138 kg) Bed scale -2.06     Post-Dialysis  Arterial Catheter Locking Solution:  saline  Venous Catheter Locking Solution:  saline  Post-Treatment Procedures: Blood returned, Catheter Capped, clamped with Saline x2 ports  Machine Disinfection Process: Exterior Machine Disinfection  Rinseback Volume (ml): 300 ml  Blood Volume Processed (Liters): 47.9 l/min  Dialyzer Clearance: Moderately streaked  Duration of Treatment (minutes): 180 minutes     Hemodialysis Intake (ml): 500 ml  Hemodialysis Output (ml): 2000 ml     Tolerated Treatment: Good  Patient Response to Treatment: good  Physician Notified: No       Provider Notification        Handoff complete and report given to Primary RN at 1210 hours. Primary RN (First Initial, Last Name, Title): YAQUELIN Lindsey RN     Education  Person Educated: Patient   Knowledge Base: Substantial  Barriers to Learning?: None  Preferred method of Learning: Oral  Topic(s): Access Care, Signs and Symptoms of Infection, Fluid Management, and Medications   Teaching Tools: Explanation   Response to Education: Verbalized Understanding     Electronically signed by Mihir Hayes LPN on 4/2/6480 at 9:56 PM

## 2022-08-03 NOTE — PROGRESS NOTES
V2.0  [facility] [place] [facility] Progress Note      Name:  Holland Pratt /Age/Sex: 1975  (52 y.o. male)   MRN & CSN:  [id number] & [id number] Encounter Date/Time: 2022 10:46 AM EDT    Location:  3194/4336-L PCP: Iwona Newman MD       Hospital Day: 10    Assessment and Plan:   Holland Pratt is a 52 y.o. 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, hypertension, hyperlipidemia, type 2 diabetes and CKD stage IV with baseline around 2.2 who presents with Syncope     Assessment and Plan     *Syncope, seems recurrent     Likely secondary to orthostatic hypotension (orthostats positive) and metabolic derangements  CT head within normal limits  EKG within normal limits  Neurology consulted, recommend resuming on plavix and lipitor for sec prevention  Neuro recommend psych consult  Neuro signed off   Fall precautions  Consult cardiology to r/o cardiac cause   cardiac stress test normal  Patient refused tilt table test  Started on fludrocortisone daily however nephro recommend stop fludro due to high BP   Monitor orthostatic blood pressure, still positive on   Patient refuse ARU or SNF   Psych assessed for depression        The patient has hx of bipolar disorder and he would benefit from ARU, pt initially agreeable however refused again on  and wishes to go home, discussed with CW  Repeat orthostats tomorrow AM, likely home after HD, discussed with pt and he is agreeable     *Acute on chronic anemia secondary to GI bleed  EGD 2022: Sandra-Roberson tear, no active bleed  Chronic normocytic anemia due to underlying chronic kidney disease  Started on EPO inpatient    Resumed on  Plavix as the GI signed off , was Contacted primary care physician Dr. Delia Calvo reports that the patient was last seen in office in October 2021 and was not on Eliquis. discontinue Eliquis   , resumed on  aspirin and Plavix to complete 12 months of DAPT post PCI. Continue IV Protonix twice daily, switched to oral on d/c  GI signed out  Monitor CBC and transfuse if hemoglobin less than 7  Hb stable at 9.2 7/31     *End-stage renal disease on hemodialysis  -started HD 7/25/2022  Nephrology following, appreciate recommendations  Monitor BMP  Tunneled HD catheter placed   outpatient HD set up, hepatitis panel negative for hepatitis B core antibody  Continue on calcitrol daily per nephro      *Type 2 diabetes with hyperglycemia  Basal insulin in addition to low-dose insulin sliding scale  Target blood glucose level 140-180  HbA1c 10%  Endocrinology following     *Intractable nausea/vomiting  -Improving  As needed antinausea medications  Tolerating diet still complain of nausea intermittently     *Hyperkalemia  -Resolved after hemodialysis     Chronic conditions include:  *Coronary artery disease status post PCI in LAD (9/2021)  -Patient is on Plavix and statin outpatient  -Resume aspirin and Plavix after cleared by GI  *Hypertension:Hold antihypertensives in the setting of newly initiated hemodialysis  *Hyperlipidemia: Continue statin  *History of Irene's gangrene  *History of toe amputations       Diet ADULT DIET; Regular; 4 carb choices (60 gm/meal);  Low Potassium (Less than 3000 mg/day); 2000 ml  ADULT ORAL NUTRITION SUPPLEMENT; Lunch; Renal Oral Supplement   DVT Prophylaxis [] Lovenox, []  Heparin, [x] SCDs, [] Ambulation,  [] Eliquis, [] Xarelto  [] Coumadin   Code Status Full Code   Disposition From: Home  Expected Disposition: Home  Estimated Date of Discharge:   Patient requires continued admission due to persistent [address]   Surrogate Decision Maker/ POA Wife     Subjective: Chief Complaint: Loss of Consciousness (Did not hit head, no blood thinners)       Vernette Opitz is a 52 y.o. male who presents with a syncopal episode. Patient was seen and evaluated at bedside. Patient reports that his nausea/vomiting episodes have improved significantly since admission. Does not wish to go to ARU anymore, wishes to go home. Continues to experience positional LH. Counseled on benefit of ARU however pt insists he wishes to go home. Denies leg swelling, CP or SOB, abdominal pain, diarrhea, fevers, chills, cough, palpitations. Objective: Intake/Output Summary (Last 24 hours) at 8/2/2022 2147  Last data filed at 8/2/2022 6047  Gross per 24 hour   Intake 720 ml   Output 1300 ml   Net -580 ml          Vitals:   Vitals:    08/02/22 1718   BP:    Pulse:    Resp: 16   Temp:    SpO2:        Physical Exam:   Physical Exam  Constitutional:       General: He is not in acute distress. Appearance: He is not ill-appearing, toxic-appearing or diaphoretic. HENT:      Head: Normocephalic and atraumatic. Nose: Nose normal.      Mouth/Throat:      Mouth: Mucous membranes are dry. Pharynx: Oropharynx is clear. Eyes:      Conjunctiva/sclera: Conjunctivae normal.      Pupils: Pupils are equal, round, and reactive to light. Neck:      Comments: HD catheter in place undergoing hemodialysis  Cardiovascular:      Rate and Rhythm: Normal rate and regular rhythm. Pulses: Normal pulses. Heart sounds: Normal heart sounds. No murmur heard. Pulmonary:      Effort: Pulmonary effort is normal.      Breath sounds: Normal breath sounds. Abdominal:      Tenderness: There is abdominal tenderness (Improved from admission). There is no guarding or rebound. Musculoskeletal:      Cervical back: Normal range of motion and neck supple. Right lower leg: No edema. Left lower leg: No edema. Comments: Multiple toe amputations   Skin:     General: Skin is warm and dry. Neurological:      General: No focal deficit present. Mental Status: He is oriented to person, place, and time.    Psychiatric:      Comments: Anxious          Medications:   Medications:    insulin glargine  45 Units SubCUTAneous Nightly    ARIPiprazole  5 mg Oral Nightly    pantoprazole  40 mg Oral BID AC    clopidogrel  75 mg Oral Daily    aspirin  81 mg Oral Daily    alogliptin  6.25 mg Oral Daily    insulin lispro  20 Units SubCUTAneous TID WC    insulin lispro  0-8 Units SubCUTAneous TID WC    insulin lispro  0-8 Units SubCUTAneous 2 times per day    sucralfate  1 g Oral BID AC    atorvastatin  40 mg Oral Nightly    sertraline  100 mg Oral Daily    sodium chloride flush  5-40 mL IntraVENous 2 times per day    calcitRIOL  0.25 mcg Oral Daily    epoetin paola-epbx  10,000 Units IntraVENous Once per day on Mon Wed Fri      Infusions:    dextrose      sodium chloride 20 mL/hr at 07/26/22 0802     PRN Meds: traZODone, 50 mg, Nightly PRN  oxyCODONE, 5 mg, Q8H PRN  glucose, 4 tablet, PRN  dextrose bolus, 125 mL, PRN   Or  dextrose bolus, 250 mL, PRN  glucagon (rDNA), 1 mg, PRN  dextrose, , Continuous PRN  sodium chloride flush, 5-40 mL, PRN  sodium chloride, , PRN  ondansetron, 4 mg, Q8H PRN   Or  ondansetron, 4 mg, Q6H PRN  polyethylene glycol, 17 g, Daily PRN  acetaminophen, 650 mg, Q6H PRN   Or  acetaminophen, 650 mg, Q6H PRN      Labs      Recent Results (from the past 24 hour(s))   POCT Glucose    Collection Time: 08/02/22  1:50 AM   Result Value Ref Range    POC Glucose 250 (H) 70 - 99 MG/DL   POCT Glucose    Collection Time: 08/02/22  6:51 AM   Result Value Ref Range    POC Glucose 216 (H) 70 - 99 MG/DL   POCT Glucose    Collection Time: 08/02/22 10:57 AM   Result Value Ref Range    POC Glucose 181 (H) 70 - 99 MG/DL   POCT Glucose    Collection Time: 08/02/22  4:01 PM   Result Value Ref Range    POC Glucose 126 (H) 70 - 99 MG/DL   POCT Glucose    Collection Time: 08/02/22  8:28 PM   Result Value Ref Range POC Glucose 159 (H) 70 - 99 MG/DL        Imaging/Diagnostics Last 24 Hours   CT ABDOMEN PELVIS WO CONTRAST Additional Contrast? None    Result Date: 7/24/2022  EXAMINATION: CT OF THE ABDOMEN AND PELVIS WITHOUT CONTRAST 7/24/2022 9:58 pm TECHNIQUE: CT of the abdomen and pelvis was performed without the administration of intravenous contrast. Multiplanar reformatted images are provided for review. Automated exposure control, iterative reconstruction, and/or weight based adjustment of the mA/kV was utilized to reduce the radiation dose to as low as reasonably achievable. COMPARISON: 08/11/2021 HISTORY: ORDERING SYSTEM PROVIDED HISTORY: pain TECHNOLOGIST PROVIDED HISTORY: Reason for exam:->pain Additional Contrast?->None Reason for Exam: pain FINDINGS: Lower Chest: Partially imaged coronary artery calcification. Organs: The liver is normal in contour and attenuation. Gallbladder is unremarkable. No biliary ductal dilatation. Pancreas, adrenals, kidneys, spleen are unremarkable. Mild calcific atherosclerosis. GI/Bowel: Left colonic diverticulosis. Bowel is nondilated without wall thickening. Appendix is normal. Pelvis: Unremarkable. Peritoneum/Retroperitoneum: No free air, free fluid, organized fluid collection, lymphadenopathy. Bones/Soft Tissues: No acute bone or soft tissue abnormality. No acute process in the abdomen or pelvis. CT HEAD WO CONTRAST    Result Date: 7/24/2022  EXAMINATION: CT OF THE HEAD WITHOUT CONTRAST  7/24/2022 9:57 pm TECHNIQUE: CT of the head was performed without the administration of intravenous contrast. Automated exposure control, iterative reconstruction, and/or weight based adjustment of the mA/kV was utilized to reduce the radiation dose to as low as reasonably achievable. COMPARISON: 11/25/2019 HISTORY: ORDERING SYSTEM PROVIDED HISTORY: syncope TECHNOLOGIST PROVIDED HISTORY: Has a \"code stroke\" or \"stroke alert\" been called? ->No Reason for exam:->syncope Decision Support Exception - unselect if not a suspected or confirmed emergency medical condition->Emergency Medical Condition (MA) Reason for Exam: syncope FINDINGS: BRAIN/VENTRICLES: There is no acute intracranial hemorrhage, mass effect or midline shift. No abnormal extra-axial fluid collection. The gray-white differentiation is maintained without evidence of an acute infarct. There is no evidence of hydrocephalus. ORBITS: The visualized portion of the orbits demonstrate no acute abnormality. SINUSES: The visualized paranasal sinuses and mastoid air cells demonstrate no acute abnormality. SOFT TISSUES/SKULL:  No acute abnormality of the visualized skull or soft tissues. No acute intracranial abnormality. CT CERVICAL SPINE WO CONTRAST    Result Date: 7/24/2022  EXAMINATION: CT OF THE CERVICAL SPINE WITHOUT CONTRAST 7/24/2022 9:58 pm TECHNIQUE: CT of the cervical spine was performed without the administration of intravenous contrast. Multiplanar reformatted images are provided for review. Automated exposure control, iterative reconstruction, and/or weight based adjustment of the mA/kV was utilized to reduce the radiation dose to as low as reasonably achievable. COMPARISON: 07/09/2018 HISTORY: ORDERING SYSTEM PROVIDED HISTORY: fall TECHNOLOGIST PROVIDED HISTORY: Reason for exam:->fall Decision Support Exception - unselect if not a suspected or confirmed emergency medical condition->Emergency Medical Condition (MA) Reason for Exam: fall FINDINGS: BONES/ALIGNMENT: There is no acute fracture or traumatic malalignment. DEGENERATIVE CHANGES: No significant degenerative changes. SOFT TISSUES: There is no prevertebral soft tissue swelling. No acute abnormality of the cervical spine.      XR CHEST PORTABLE    Result Date: 7/24/2022  EXAMINATION: ONE XRAY VIEW OF THE CHEST 7/24/2022 8:31 pm COMPARISON: 09/30/2021 HISTORY: ORDERING SYSTEM PROVIDED HISTORY: Chest pain TECHNOLOGIST PROVIDED HISTORY: Reason for exam:->Chest pain Reason for Exam: loss of consciousness Additional signs and symptoms: chest pain FINDINGS: Cardiomediastinal silhouette is unchanged in size. There is no pleural effusion or pneumothorax. There is no pulmonary consolidation. There is no acute osseous abnormality. No acute cardiopulmonary abnormality. US RETROPERITONEAL LIMITED    Result Date: 7/25/2022  EXAMINATION: ULTRASOUND OF THE KIDNEYS 7/24/2022 11:18 pm COMPARISON: None. HISTORY: ORDERING SYSTEM PROVIDED HISTORY: García TECHNOLOGIST PROVIDED HISTORY: Reason for exam:->García Reason for Exam: garcía FINDINGS: The right kidney measures 12.8 cm in length and the left kidney measures 13.2 cm in length. Kidneys demonstrate normal cortical echogenicity. No hydronephrosis or intrarenal stones. No focal lesions. Unremarkable ultrasound of the kidneys.        Electronically signed by Maureen Rodriguez MD on 8/2/2022 at 9:47 PM

## 2022-08-03 NOTE — PROGRESS NOTES
Nephrology Progress Note        [address], [address] [address]  Phone: [phone]  Office Hours: 8:30AM - 4:30PM  Monday - Friday        8/3/2022 8:08 AM  Subjective:   Admit Date: 7/24/2022  PCP: Kirti Owens MD  Interval History:   On room air  Feeling well    Diet: ADULT DIET; Regular; 4 carb choices (60 gm/meal); Low Potassium (Less than 3000 mg/day); 2000 ml  ADULT ORAL NUTRITION SUPPLEMENT; Lunch; Renal Oral Supplement      Data:   Scheduled Meds:   insulin glargine  45 Units SubCUTAneous Nightly    ARIPiprazole  5 mg Oral Nightly    pantoprazole  40 mg Oral BID AC    clopidogrel  75 mg Oral Daily    aspirin  81 mg Oral Daily    alogliptin  6.25 mg Oral Daily    insulin lispro  20 Units SubCUTAneous TID WC    insulin lispro  0-8 Units SubCUTAneous TID WC    insulin lispro  0-8 Units SubCUTAneous 2 times per day    sucralfate  1 g Oral BID AC    atorvastatin  40 mg Oral Nightly    sertraline  100 mg Oral Daily    sodium chloride flush  5-40 mL IntraVENous 2 times per day    calcitRIOL  0.25 mcg Oral Daily    epoetin paola-epbx  10,000 Units IntraVENous Once per day on Mon Wed Fri     Continuous Infusions:   dextrose      sodium chloride 20 mL/hr at 07/26/22 0802     PRN Meds:traZODone, oxyCODONE, glucose, dextrose bolus **OR** dextrose bolus, glucagon (rDNA), dextrose, sodium chloride flush, sodium chloride, ondansetron **OR** ondansetron, polyethylene glycol, acetaminophen **OR** acetaminophen  I/O last 3 completed shifts:   In: 720 [P.O.:720]  Out: 1700 [Urine:1700]  I/O this shift:  In: -   Out: 900 [Urine:900]    Intake/Output Summary (Last 24 hours) at 8/3/2022 0808  Last data filed at 8/3/2022 0727  Gross per 24 hour   Intake 720 ml   Output 2000 ml   Net -1280 ml       CBC:   Recent Labs     08/01/22  0650   WBC 7.8   HGB 9.4*          BMP:    Recent Labs     08/01/22  0650      K 4.1      CO2 24   BUN 53*   CREATININE 6.5*   GLUCOSE 145*     Hepatic: Recent Labs     08/01/22  0650   AST 11*   ALT 11   BILITOT 0.2   ALKPHOS 84     Troponin: No results for input(s): TROPONINI in the last 72 hours. BNP: No results for input(s): BNP in the last 72 hours. Lipids: No results for input(s): CHOL, HDL in the last 72 hours. Invalid input(s): LDLCALCU  ABGs:   Lab Results   Component Value Date/Time    PO2ART 119 06/18/2013 01:15 PM    BYN2TTC 47.0 06/18/2013 01:15 PM     INR: No results for input(s): INR in the last 72 hours.     Objective:   Vitals: BP (!) 140/70   Pulse 85   Temp 98.1 °F (36.7 °C) (Oral)   Resp 19   Ht 5' 9\" (1.753 m)   Wt (!) 308 lb 10.3 oz (140 kg)   SpO2 93%   BMI 45.58 kg/m²   General appearance: alert and cooperative with exam, in no acute distress  HEENT: normocephalic, atraumatic,   Neck: supple, trachea midline  Lungs breathing comfortably on room air  Heart[de-identified] regular rate and rhythm  Extremities: extremities atraumatic, no cyanosis or edema  Neurologic: alert, oriented, follows commands, interactive    Assessment and Plan:     Patient Active Problem List    Diagnosis Date Noted    CARMEN (acute kidney injury) ([facility] 07/25/2022    Anemia 07/25/2022    Recurrent major depressive disorder, in full remission ([facility] 05/18/2021    Generalized anxiety disorder 05/18/2021    Surgical wound dehiscence 02/08/2022    Chest pain 12/21/2021    Other proteinuria     FH: CAD (coronary artery disease) 09/29/2021    ASCVD (arteriosclerotic cardiovascular disease) 09/29/2021    Chronic kidney disease, stage IV (severe) ([facility] 09/28/2021    Nephrotic syndrome 09/22/2021    Generalized edema 09/22/2021    Stage 3b chronic kidney disease ([facility] 09/22/2021    Diabetic nephropathy associated with type 2 diabetes mellitus ([facility] 09/22/2021    Hypoalbuminemia 09/22/2021    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 type 2 diabetes mellitus, with muscle involvement without evidence of necrosis ([facility] 09/21/2021    Right groin wound 06/09/2021 Ulcer of right groin with fat layer exposed ([address]     Syncope 06/05/2021    Necrotizing fasciitis ([address] 05/24/2021    Morbid obesity with body mass index (BMI) of 40.0 to 44.9 in adult [facility]) 05/21/2021    Irene gangrene     Cellulitis, scrotum 05/13/2021    Acute epididymitis     Cellulitis of left arm 04/03/2021    Cellulitis 03/23/2021    Acute renal failure (ARF) ([address] 08/04/2019    Diabetic foot infection ([address] 03/21/2019    Intractable nausea and vomiting 01/11/2019    Uncontrolled type 2 diabetes mellitus ([address] 01/11/2019    Nausea and vomiting 01/11/2019    Constipation 10/07/2018    Cellulitis of left foot     Sepsis ([address] 78/02/0054    Periumbilical hernia     Abscess 12/03/2017    Osteomyelitis ([address] 05/22/2015    Bronchitis 10/15/2013    Hyperlipidemia with target LDL less than 100 07/15/2013    Essential hypertension 07/15/2013    Bilateral carpal tunnel syndrome- left worse than right 06/24/2013    DANIELA (obstructive sleep apnea) 06/20/2013    Urinary frequency 06/20/2013    Neck pain 05/16/2013    Anxiety associated with depression     Panic attack 02/01/2012    Diabetic neuropathy ([address] 12/22/2011    Hiatal hernia 02/04/2011    Diabetes mellitus type 2, improved controlled 02/04/2011     -HD today  -Orthostasis: liv only raised his BP joi and will result in fluid  retention so had to be stopped.   He will need behavioral changes at home such as getting up slowly and waiting few seconds/minutes before initiating his steps  -please no lasix nor chlorthalidone on dc    Thank you                  Electronically signed by Toya Barnett DO on 8/3/2022 at 8:08 MD Belkys Reese DO [address]  [place], [place] [address]  PHONE: [phone]  FAX: [phone]

## 2022-08-03 NOTE — PLAN OF CARE
Problem: Discharge Planning  Goal: Discharge to home or other facility with appropriate resources  Outcome: Progressing     Problem: Pain  Goal: Verbalizes/displays adequate comfort level or baseline comfort level  Outcome: Progressing     Problem: Safety - Adult  Goal: Free from fall injury  Outcome: Progressing     Problem: Chronic Conditions and Co-morbidities  Goal: Patient's chronic conditions and co-morbidity symptoms are monitored and maintained or improved  Outcome: Progressing     Problem: Nutrition Deficit:  Goal: Optimize nutritional status  Outcome: Progressing

## 2022-08-04 NOTE — PROGRESS NOTES
Progress Note( Dr. Yessenia Swan)  8/3/2022  Subjective:   Admit Date: 7/24/2022  PCP: Alen Ruiz MD    Admitted For :Generalized weakness and syncope       Consulted For:  Better control of blood glucose    Interval History: Had EGD done this morning shows  ) Sandra-Roberson tear , no active bleed at this point                2) normal stomach, normal esophagus                3) normal duodenum  Cardiac stress test negative for any CAD  Patient unable to do tilt test  With Florinef on board patient's blood pressures seem to be somewhat better with less postural hypotension      Denies any chest pains,   Denies SOB . Denies nausea or vomiting. No new bowel or bladder symptoms. Intake/Output Summary (Last 24 hours) at 8/3/2022 2139  Last data filed at 8/3/2022 1341  Gross per 24 hour   Intake 740 ml   Output 3750 ml   Net -3010 ml         DATA    CBC:   Recent Labs     08/01/22  0650   WBC 7.8   HGB 9.4*         CMP:  Recent Labs     08/01/22  0650      K 4.1      CO2 24   BUN 53*   CREATININE 6.5*   CALCIUM 7.7*   PROT 5.4*   LABALBU 2.8*   BILITOT 0.2   ALKPHOS 84   AST 11*   ALT 11       Lipids:   Lab Results   Component Value Date/Time    CHOL 145 05/13/2021 10:28 AM    CHOL 168 10/15/2013 10:30 AM    HDL 29 05/13/2021 10:28 AM    TRIG 188 05/13/2021 10:28 AM     Glucose:  Recent Labs     08/03/22  0232 08/03/22  0632 08/03/22  1336   POCGLU 231* 171* 161*       [id number]:  Lab Results   Component Value Date/Time    LABA1C 10.0 07/25/2022 06:50 AM     High Sensitivity TSH:   Lab Results   Component Value Date/Time    TSHHS 3.860 07/25/2022 06:50 AM     Free T3: No results found for: FT3  Free T4:  Lab Results   Component Value Date/Time    T4FREE 1.25 07/24/2017 11:10 AM       MRI BRAIN WO CONTRAST   Final Result   1. No acute intracranial abnormality. 2. Trace bilateral mastoid effusions.          NM MYOCARDIAL SPECT REST EXERCISE OR RX   Final Result      IR TUNNELED CVC PLACE WO SQ PORT/PUMP > 5 YEARS   Final Result   Successful ultrasound and fluoroscopy guided Port-A-Cath/tunneled dialysis   access catheter placement via right internal jugular venous approach. XR CHEST PORTABLE   Final Result   Temper dialysis access catheter via right lower cervical approach with tip in   the mid right atrium. No complication suggested. Underlying findings consistent with congestive heart failure/hypervolemia s/p   or low lung volumes without detectable pleural effusion. US RETROPERITONEAL LIMITED   Final Result   Unremarkable ultrasound of the kidneys. CT ABDOMEN PELVIS WO CONTRAST Additional Contrast? None   Final Result   No acute process in the abdomen or pelvis. CT CERVICAL SPINE WO CONTRAST   Final Result   No acute abnormality of the cervical spine. CT HEAD WO CONTRAST   Final Result   No acute intracranial abnormality. XR CHEST PORTABLE   Final Result   No acute cardiopulmonary abnormality. IR NONTUNNELED VASCULAR CATHETER > 5 YEARS    (Results Pending)        Scheduled Medicines   Medications:   REM     Infusions:   REM        Objective:   Vitals: BP (!) 149/95   Pulse 78   Temp 98 °F (36.7 °C)   Resp 17   Ht 5' 9\" (1.753 m)   Wt (!) 304 lb 3.8 oz (138 kg)   SpO2 96%   BMI 44.93 kg/m²   General appearance: alert and cooperative with exam  Neck: no JVD or bruit  Thyroid : Normal lobes   Lungs: Has Vesicular Breath sounds   Heart:  regular rate and rhythm  Abdomen: soft, non-tender; bowel sounds normal; no masses,  no organomegaly  Musculoskeletal: Normal  Extremities: extremities normal, , bilateral leg edema  Neurologic:  Awake, alert, oriented to name, place and time. Cranial nerves II-XII are grossly intact. Motor is  intact.   Sensory neuropathy,  and gait is normal.    Assessment:     Patient Active Problem List:     Hiatal hernia     Diabetes mellitus type 2, improved controlled     Diabetic neuropathy (HCC)     Panic attack     Anxiety associated with depression     Neck pain     DANIELA (obstructive sleep apnea)     Urinary frequency     Bilateral carpal tunnel syndrome- left worse than right     Hyperlipidemia with target LDL less than 100     Essential hypertension     Bronchitis     Osteomyelitis (HCC)     Abscess     Periumbilical hernia     Sepsis (HCC)     Cellulitis of left foot     Constipation     Intractable nausea and vomiting     Uncontrolled type 2 diabetes mellitus (HCC)     Nausea and vomiting     Diabetic foot infection ([address]     Acute renal failure (ARF) (HCC)     Cellulitis     Cellulitis of left arm     Cellulitis, scrotum     Acute epididymitis     Irene gangrene     Recurrent major depressive disorder, in full remission ([address]     Generalized anxiety disorder     Morbid obesity with body mass index (BMI) of 40.0 to 44.9 in adult [facility])     Necrotizing fasciitis ([address]     Syncope     Right groin wound     Ulcer of right groin with fat layer exposed ([address]     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 type 2 diabetes mellitus, with muscle involvement without evidence of necrosis ([facility])     Nephrotic syndrome     Generalized edema     Stage 3b chronic kidney disease ([address]     Diabetic nephropathy associated with type 2 diabetes mellitus (HCC)     Hypoalbuminemia     Chronic kidney disease, stage IV (severe) ([facility])     FH: CAD (coronary artery disease)     ASCVD (arteriosclerotic cardiovascular disease)     Other proteinuria     Chest pain     Surgical wound dehiscence     CARMEN (acute kidney injury) ([address]     Anemia      Plan:     Reviewed POC blood glucose . Labs and X ray results   Reviewed Current Medicines   On meal/ Correction bolus Humalog/ Basal Lantus Insulin regime  Monitor Blood glucose frequently   Modified  the dose of Insulin/ other medicines as needed   Now patient is on scheduled hemodialysis   's postural hypotension, hospital started on Florinef  Will follow     .      Meg Sagastume, MD, MD

## 2022-08-05 NOTE — DISCHARGE SUMMARY
V2.0  Discharge Summary    Name:  Holland Pratt /Age/Sex: 1975 (48 y.o. male)   Admit Date: 2022  Discharge Date: 22    MRN & CSN:  [id number] & [id number] Encounter Date and Time 22 10:40 PM EDT    Attending:  No att. providers found Discharging Provider: Moustapha Pablo MD       Hospital Course:     Holland Pratt is a 52 y.o. 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, hypertension, hyperlipidemia, type 2 diabetes and CKD stage IV with baseline around 2.2 who presents with Syncope     Assessment and Plan     *Syncope, seems recurrent     Likely secondary to orthostatic hypotension (orthostats positive) and metabolic derangements  CT head, EKG wnl  Neurology consulted, recommend resuming on plavix and lipitor for sec prevention  Neuro recommend psych consult  Psych assessed for depression  Consulted cardiology to r/o cardiac cause, stress test wnl  Patient refused tilt table test  Started on fludrocortisone daily however nephro recommend stop fludro due to high BP   Patient refuse ARU and SNF   Counseled him on fall precautions and lifestyle modifications to avoid further falls     *Acute on chronic anemia secondary to GI bleed  EGD 2022: Sandra-Roberson tear, no active bleed  Chronic normocytic anemia due to underlying chronic kidney disease  Started on EPO inpatient    Resumed on  Plavix as the GI signed off , was Contacted primary care physician Dr. Chun Grade reports that the patient was last seen in office in 2021 and was not on Eliquis.   discontinue Eliquis   , resumed on  aspirin and Plavix to complete 12 months of DAPT post PCI. Continue IV Protonix twice daily, switched to oral on d/c  Hgb stable on discharge     *End-stage renal disease on hemodialysis  -started HD 7/25/2022  Nephrology following, appreciate recommendations  Monitor BMP  Tunneled HD catheter placed   outpatient HD set up, hepatitis panel negative for hepatitis B core antibody  Continue on calcitrol daily per nephro      *Type 2 diabetes with hyperglycemia  Basal insulin in addition to low-dose insulin sliding scale  Target blood glucose level 140-180  HbA1c 10%  Endocrinology following     *Intractable nausea/vomiting  -Improving  As needed antinausea medications  Tolerating diet still complain of nausea intermittently     *Hyperkalemia  -Resolved after hemodialysis      The patient expressed appropriate understanding of, and agreement with the discharge recommendations, medications, and plan. Consults this admission:  IP CONSULT TO NEPHROLOGY  IP CONSULT TO HOSPITALIST  IP CONSULT TO ENDOCRINOLOGY  IP CONSULT TO CASE MANAGEMENT  IP CONSULT TO IV TEAM  IP CONSULT TO NEUROLOGY  IP CONSULT TO GI  IP CONSULT TO GI  IP CONSULT TO INTERVENTIONAL RADIOLOGY  IP CONSULT TO CARDIOLOGY  IP CONSULT TO PSYCHIATRY    Discharge Diagnosis:   Syncope        Discharge Instruction:   Follow up appointments: As instructed above  Primary care physician: Angelito Arrington MD within 2 weeks  Diet: renal diet   Activity: activity as tolerated and fall precuations  Disposition: Discharged to:   [x]Home, [][facility], []SNF, []Acute Rehab, []Hospice   Condition on discharge: Stable  Labs and Tests to be Followed up as an outpatient by PCP or Specialist:     Discharge Medications:        Medication List        START taking these medications      alogliptin 6.25 MG Tabs tablet  Commonly known as: NESINA  Take 1 tablet by mouth in the morning.      ARIPiprazole 5 MG tablet  Commonly known as: ABILIFY  Take 1 tablet by mouth at bedtime     calcitRIOL 0.25 MCG capsule  Commonly UNIT/ML injection pen     linagliptin 5 MG tablet  Commonly known as: TRADJENTA     metoprolol tartrate 25 MG tablet  Commonly known as: LOPRESSOR               Where to Get Your Medications        These medications were sent to [facility] [phone] [facility], [facility] [address] [phone]  [address]      Phone: [phone]   alogliptin 6.25 MG Tabs tablet  ARIPiprazole 5 MG tablet  calcitRIOL 0.25 MCG capsule  insulin lispro (1 Unit Dial) 100 UNIT/ML Sopn  Lantus SoloStar 100 UNIT/ML injection pen  pantoprazole 40 MG tablet  sucralfate 1 GM tablet  traZODone 50 MG tablet        Objective Findings at Discharge:   BP (!) 149/95   Pulse 78   Temp 98 °F (36.7 °C)   Resp 17   Ht 5' 9\" (1.753 m)   Wt (!) 304 lb 3.8 oz (138 kg)   SpO2 96%   BMI 44.93 kg/m²       Physical Exam:   Constitutional:       General: He is not in acute distress. Appearance: He is not ill-appearing, toxic-appearing or diaphoretic. HENT:     Head: Normocephalic and atraumatic. Nose: Nose normal.     Mouth/Throat:     Pharynx: Oropharynx is clear. Eyes:     Conjunctiva/sclera: Conjunctivae normal.     Pupils: Pupils are equal, round, and reactive to light. Neck:     Comments: HD catheter in place  Cardiovascular:     Rate and Rhythm: Normal rate and regular rhythm. Pulses: Normal pulses. Heart sounds: Normal heart sounds. No murmur heard. Pulmonary:     Effort: Pulmonary effort is normal.     Breath sounds: Normal breath sounds. Abdominal:     Tenderness: There is no abdominal tenderness. There is no guarding or rebound. Musculoskeletal:     Cervical back: Normal range of motion and neck supple. Right lower leg: No edema. Left lower leg: No edema. Comments: Multiple toe amputations   Skin:     General: Skin is warm and dry. Neurological:     General: No focal deficit present.      Mental Status: He is oriented to person, place, and NM Technologist      Casimiro Guerrier [facility]   Ordering          Gloria Ledezma   Interpreting         Gloria Ledezma  Physician         Madhu Llamas MD      Cardiologist         Madhu Llamas MD   Conclusions   Summary  abnormal stress test due to depressed LVEF, normal perfusion, diaphragmatic  attenuation artifact noted   Recommendation  optimize medications, ECHO correlation for LVEF determination   Signatures   ------------------------------------------------------------------  Electronically signed by Rivas Coates MD  (Interpreting cardiologist) on 07/29/2022 at 15:31  ------------------------------------------------------------------  Procedure Procedure Type:   Nuclear Stress Test:Pharmacological, Myocardial Perfusion Imaging with  Pharm, NM MYOCARDIAL SPECT REST EXERCISE OR RX  Indications: Syncope. Risk Factors   The patient risk factors include:obesity, Current - Every day tobacco use,  hypercholesterolemia, hypertension, diabetes mellitus and chronic lung  disease. Stress Protocols   Resting ECG  Normal sinus rhythm. Resting HR:79 bpm  Resting BP:166/93 mmHg  Stress Protocol:Pharmacologic - Lexiscan  Peak HR:88 bpm                               HR/BP product:[id number]  Peak BP:183/100 mmHg  Predicted HR: 173 bpm  % of predicted HR: 51   Exercise duration: 01:04 min  Reason for termination:Completed   ECG Findings  nsr   Symptoms  Shortness of breath. Nausea. Stress Interpretation  ECG portion of stress test is negative for ischemia by diagnostic criteria. Procedure Medications   - Lexiscan I.V. bolus (over 15sec.) 0.4 mg admininstered @ 07/29/2022 08:20.   - Aminophylline I.V. 75 mg admininstered @ 07/29/2022 08:23. Imaging Protocols   Rest                             Stress   Isotope:Sestamibi 99mTc          Isotope: Sestamibi 99mTc  Isotope dose:10.7 mCi            Isotope dose:32.9 mCi  Administration route: I.V. Administration route: I.V.   Injection Date:07/28/2022 09:05  Injection Date:07/29/2022 08:20  Scan Date:07/28/2022 09:50       Scan Date:07/29/2022 09:05   Technique:        SPECT          Technique:        Gated                                                     SPECT   Procedure Description   Upon patient arrival, the patient is identified using two identifiers and  the physician order is verified. An IV is established and 8-11mCi of 99mTc  Sestamibi is intravenously injected and followed with 10mL 0.9% Normal  Saline flush. A circulation period of 45 minutes occurs prior to resting  SPECT imaging. After imaging is complete the patient is escorted to the  stress lab. The patient is connected to the ECG and blood pressure is  measured. The RN starts the stress portion of the exam and rapidly  intravenously injects Lexiscan (regadenosine) 0.4mg over a period of 10 to15  seconds and follows with 5mL 0.9% Normal Saline flush. Immediately following  the Nuclear Technologist intravenously injects 22-33mCi of 99mTc Sestamibi  and 5mL 0.9% Normal Saline flush. After completion, recovery, and removal of  the IV, the patient rests during the second circulation period of 45  minutes. Final stress SPECT gated imaging is performed. The patient may  return home or to their room after stress imaging. The images are processed  and final charting is completed and sent to the appropriate cardiologist for  interpretation and reporting. Perfusion Interpretation   Normal tracer uptake in all myocardial segment during rest and stress. Decreased uptake inferiorly due to diaphragmatic artifact. Imaging Results    Summed scores     - Summed stress score: 18     - Summed rest score: 11     - Summed difference score:    5   Rest ejection  Ejection fraction:36 %  EDV :126 ml  ESV :81 ml  Stroke volume :45 ml  Medical History   Accession#:  [id number]  Admission Data Admission date: 07/24/2022 Admission Time: 19:49 Hospital Status: Inpatient.       CBC: No results for input(s): WBC, HGB, PLT in the last 72 hours. BMP:  No results for input(s): NA, K, CL, CO2, BUN, CREATININE, GLUCOSE in the last 72 hours. Hepatic: No results for input(s): AST, ALT, ALB, BILITOT, ALKPHOS in the last 72 hours. Lipids:   Lab Results   Component Value Date/Time    CHOL 145 05/13/2021 10:28 AM    CHOL 168 10/15/2013 10:30 AM    HDL 29 05/13/2021 10:28 AM    TRIG 188 05/13/2021 10:28 AM     Hemoglobin A1C:   Lab Results   Component Value Date/Time    LABA1C 10.0 07/25/2022 06:50 AM     TSH: No results found for: TSH  Troponin:   Lab Results   Component Value Date/Time    TROPONINT 0.096 07/25/2022 06:50 AM    TROPONINT 0.097 07/24/2022 08:18 PM    TROPONINT 0.044 12/21/2021 04:25 PM     Lactic Acid: No results for input(s): LACTA in the last 72 hours. BNP: No results for input(s): PROBNP in the last 72 hours.   UA:  Lab Results   Component Value Date/Time    NITRU NEGATIVE 07/24/2022 11:40 PM    COLORU YELLOW 07/24/2022 11:40 PM    PHUR 6.5 06/20/2013 09:51 AM    WBCUA 2 07/24/2022 11:40 PM    RBCUA 1 07/24/2022 11:40 PM    MUCUS RARE 07/24/2022 11:40 PM    TRICHOMONAS NONE SEEN 07/24/2022 11:40 PM    BACTERIA NEGATIVE 07/24/2022 11:40 PM    CLARITYU CLEAR 07/24/2022 11:40 PM    SPECGRAV 1.020 07/24/2022 11:40 PM    LEUKOCYTESUR NEGATIVE 07/24/2022 11:40 PM    UROBILINOGEN 0.2 07/24/2022 11:40 PM    BILIRUBINUR NEGATIVE 07/24/2022 11:40 PM    BILIRUBINUR neg 06/20/2013 09:51 AM    BLOODU SMALL NUMBER OR AMOUNT OBSERVED 07/24/2022 11:40 PM    GLUCOSEU 1,000 06/20/2013 09:51 AM    KETUA NEGATIVE 07/24/2022 11:40 PM    AMORPHOUS RARE 07/24/2022 11:40 PM     Urine Cultures: No results found for: LABURIN  Blood Cultures: No results found for: BC  No results found for: BLOODCULT2  Organism:   Lab Results   Component Value Date/Time    ORG  01/07/2019 12:08 AM       Time Spent Discharging patient 50 minutes    Electronically signed by Gaston Prieto MD on 8/4/2022 at 10:40 PM

## 2022-08-08 ENCOUNTER — HOSPITAL ENCOUNTER (OUTPATIENT)
Age: 47
Setting detail: SPECIMEN
Discharge: HOME | End: 2022-08-08

## 2022-08-08 LAB
ANION GAP SERPL CALCULATED.3IONS-SCNC: 15 MMOL/L (ref 4–16)
BUN BLDV-MCNC: 48 MG/DL (ref 6–23)
CALCIUM SERPL-MCNC: 7 MG/DL (ref 8.3–10.6)
CHLORIDE BLD-SCNC: 103 MMOL/L (ref 99–110)
CO2: 22 MMOL/L (ref 21–32)
CREAT SERPL-MCNC: 7.6 MG/DL (ref 0.9–1.3)
GFR AFRICAN AMERICAN: 9 ML/MIN/1.73M2
GFR NON-AFRICAN AMERICAN: 8 ML/MIN/1.73M2
GLUCOSE BLD-MCNC: 68 MG/DL (ref 70–99)
POTASSIUM SERPL-SCNC: 4.2 MMOL/L (ref 3.5–5.1)
SODIUM BLD-SCNC: 140 MMOL/L (ref 135–145)

## 2022-08-08 PROCEDURE — 80048 BASIC METABOLIC PNL TOTAL CA: CPT

## 2022-08-15 ENCOUNTER — HOSPITAL ENCOUNTER (OUTPATIENT)
Age: 47
Setting detail: SPECIMEN
Discharge: HOME | End: 2022-08-15

## 2022-08-15 LAB
ANION GAP SERPL CALCULATED.3IONS-SCNC: 16 MMOL/L (ref 4–16)
BUN BLDV-MCNC: 65 MG/DL (ref 6–23)
CALCIUM SERPL-MCNC: 8 MG/DL (ref 8.3–10.6)
CHLORIDE BLD-SCNC: 102 MMOL/L (ref 99–110)
CO2: 23 MMOL/L (ref 21–32)
CREAT SERPL-MCNC: 6.8 MG/DL (ref 0.9–1.3)
GFR AFRICAN AMERICAN: 11 ML/MIN/1.73M2
GFR NON-AFRICAN AMERICAN: 9 ML/MIN/1.73M2
GLUCOSE BLD-MCNC: 75 MG/DL (ref 70–99)
POTASSIUM SERPL-SCNC: 4.6 MMOL/L (ref 3.5–5.1)
SODIUM BLD-SCNC: 141 MMOL/L (ref 135–145)

## 2022-08-15 PROCEDURE — 80048 BASIC METABOLIC PNL TOTAL CA: CPT

## 2022-08-22 ENCOUNTER — HOSPITAL ENCOUNTER (OUTPATIENT)
Age: 47
Setting detail: SPECIMEN
Discharge: HOME | End: 2022-08-22

## 2022-08-22 ENCOUNTER — HOSPITAL ENCOUNTER (INPATIENT)
Age: 47
LOS: 3 days | Discharge: HOME | DRG: 246 | End: 2022-08-25
Attending: INTERNAL MEDICINE | Admitting: INTERNAL MEDICINE
Payer: MEDICARE

## 2022-08-22 PROBLEM — R07.89 CHEST TIGHTNESS: Status: ACTIVE | Noted: 2022-08-22

## 2022-08-22 LAB
ANION GAP SERPL CALCULATED.3IONS-SCNC: 14 MMOL/L (ref 4–16)
BUN BLDV-MCNC: 68 MG/DL (ref 6–23)
CALCIUM SERPL-MCNC: 7.6 MG/DL (ref 8.3–10.6)
CHLORIDE BLD-SCNC: 104 MMOL/L (ref 99–110)
CO2: 23 MMOL/L (ref 21–32)
CREAT SERPL-MCNC: 7.1 MG/DL (ref 0.9–1.3)
GFR AFRICAN AMERICAN: 10 ML/MIN/1.73M2
GFR NON-AFRICAN AMERICAN: 8 ML/MIN/1.73M2
GLUCOSE BLD-MCNC: 127 MG/DL (ref 70–99)
GLUCOSE BLD-MCNC: 139 MG/DL (ref 70–99)
POTASSIUM SERPL-SCNC: 4.3 MMOL/L (ref 3.5–5.1)
SODIUM BLD-SCNC: 141 MMOL/L (ref 135–145)

## 2022-08-22 PROCEDURE — 2580000003 HC RX 258: Performed by: STUDENT IN AN ORGANIZED HEALTH CARE EDUCATION/TRAINING PROGRAM

## 2022-08-22 PROCEDURE — 6370000000 HC RX 637 (ALT 250 FOR IP): Performed by: STUDENT IN AN ORGANIZED HEALTH CARE EDUCATION/TRAINING PROGRAM

## 2022-08-22 PROCEDURE — 82962 GLUCOSE BLOOD TEST: CPT

## 2022-08-22 PROCEDURE — 1200000000 HC SEMI PRIVATE

## 2022-08-22 PROCEDURE — 80048 BASIC METABOLIC PNL TOTAL CA: CPT

## 2022-08-22 RX ORDER — SODIUM CHLORIDE 9 MG/ML
INJECTION, SOLUTION INTRAVENOUS PRN
Status: DISCONTINUED | OUTPATIENT
Start: 2022-08-22 | End: 2022-08-25 | Stop reason: HOSPADM

## 2022-08-22 RX ORDER — CALCITRIOL 0.25 UG/1
0.25 CAPSULE, LIQUID FILLED ORAL DAILY
Status: DISCONTINUED | OUTPATIENT
Start: 2022-08-23 | End: 2022-08-25 | Stop reason: HOSPADM

## 2022-08-22 RX ORDER — ONDANSETRON 4 MG/1
4 TABLET, ORALLY DISINTEGRATING ORAL EVERY 8 HOURS PRN
Status: DISCONTINUED | OUTPATIENT
Start: 2022-08-22 | End: 2022-08-25 | Stop reason: HOSPADM

## 2022-08-22 RX ORDER — SODIUM CHLORIDE 0.9 % (FLUSH) 0.9 %
5-40 SYRINGE (ML) INJECTION PRN
Status: DISCONTINUED | OUTPATIENT
Start: 2022-08-22 | End: 2022-08-25 | Stop reason: HOSPADM

## 2022-08-22 RX ORDER — DEXTROSE MONOHYDRATE 100 MG/ML
INJECTION, SOLUTION INTRAVENOUS CONTINUOUS PRN
Status: DISCONTINUED | OUTPATIENT
Start: 2022-08-22 | End: 2022-08-25 | Stop reason: HOSPADM

## 2022-08-22 RX ORDER — ATORVASTATIN CALCIUM 40 MG/1
40 TABLET, FILM COATED ORAL NIGHTLY
Status: DISCONTINUED | OUTPATIENT
Start: 2022-08-22 | End: 2022-08-25 | Stop reason: HOSPADM

## 2022-08-22 RX ORDER — INSULIN GLARGINE 100 [IU]/ML
0.25 INJECTION, SOLUTION SUBCUTANEOUS NIGHTLY
Status: DISCONTINUED | OUTPATIENT
Start: 2022-08-22 | End: 2022-08-25 | Stop reason: HOSPADM

## 2022-08-22 RX ORDER — ONDANSETRON 2 MG/ML
4 INJECTION INTRAMUSCULAR; INTRAVENOUS EVERY 6 HOURS PRN
Status: DISCONTINUED | OUTPATIENT
Start: 2022-08-22 | End: 2022-08-24 | Stop reason: SDUPTHER

## 2022-08-22 RX ORDER — MAGNESIUM HYDROXIDE/ALUMINUM HYDROXICE/SIMETHICONE 120; 1200; 1200 MG/30ML; MG/30ML; MG/30ML
30 SUSPENSION ORAL EVERY 6 HOURS PRN
Status: DISCONTINUED | OUTPATIENT
Start: 2022-08-22 | End: 2022-08-25 | Stop reason: HOSPADM

## 2022-08-22 RX ORDER — OXYCODONE HYDROCHLORIDE AND ACETAMINOPHEN 5; 325 MG/1; MG/1
1 TABLET ORAL EVERY 6 HOURS PRN
Status: COMPLETED | OUTPATIENT
Start: 2022-08-22 | End: 2022-08-23

## 2022-08-22 RX ORDER — ARIPIPRAZOLE 5 MG/1
5 TABLET ORAL NIGHTLY
Status: DISCONTINUED | OUTPATIENT
Start: 2022-08-22 | End: 2022-08-25 | Stop reason: HOSPADM

## 2022-08-22 RX ORDER — INSULIN LISPRO 100 [IU]/ML
12 INJECTION, SOLUTION INTRAVENOUS; SUBCUTANEOUS
Status: DISCONTINUED | OUTPATIENT
Start: 2022-08-23 | End: 2022-08-25 | Stop reason: HOSPADM

## 2022-08-22 RX ORDER — PANTOPRAZOLE SODIUM 40 MG/1
40 TABLET, DELAYED RELEASE ORAL
Status: DISCONTINUED | OUTPATIENT
Start: 2022-08-23 | End: 2022-08-25 | Stop reason: HOSPADM

## 2022-08-22 RX ORDER — INSULIN LISPRO 100 [IU]/ML
0-4 INJECTION, SOLUTION INTRAVENOUS; SUBCUTANEOUS NIGHTLY
Status: DISCONTINUED | OUTPATIENT
Start: 2022-08-22 | End: 2022-08-25 | Stop reason: HOSPADM

## 2022-08-22 RX ORDER — INSULIN LISPRO 100 [IU]/ML
0-4 INJECTION, SOLUTION INTRAVENOUS; SUBCUTANEOUS
Status: DISCONTINUED | OUTPATIENT
Start: 2022-08-23 | End: 2022-08-25 | Stop reason: HOSPADM

## 2022-08-22 RX ORDER — SODIUM CHLORIDE 0.9 % (FLUSH) 0.9 %
5-40 SYRINGE (ML) INJECTION EVERY 12 HOURS SCHEDULED
Status: DISCONTINUED | OUTPATIENT
Start: 2022-08-22 | End: 2022-08-25 | Stop reason: HOSPADM

## 2022-08-22 RX ORDER — POLYETHYLENE GLYCOL 3350 17 G/17G
17 POWDER, FOR SOLUTION ORAL DAILY PRN
Status: DISCONTINUED | OUTPATIENT
Start: 2022-08-22 | End: 2022-08-25 | Stop reason: HOSPADM

## 2022-08-22 RX ORDER — NITROGLYCERIN 0.4 MG/1
0.4 TABLET SUBLINGUAL EVERY 5 MIN PRN
Status: DISCONTINUED | OUTPATIENT
Start: 2022-08-22 | End: 2022-08-25 | Stop reason: HOSPADM

## 2022-08-22 RX ORDER — ACETAMINOPHEN 325 MG/1
650 TABLET ORAL EVERY 6 HOURS PRN
Status: DISCONTINUED | OUTPATIENT
Start: 2022-08-22 | End: 2022-08-24 | Stop reason: ALTCHOICE

## 2022-08-22 RX ORDER — TRAZODONE HYDROCHLORIDE 50 MG/1
50 TABLET ORAL NIGHTLY PRN
Status: DISCONTINUED | OUTPATIENT
Start: 2022-08-22 | End: 2022-08-25 | Stop reason: HOSPADM

## 2022-08-22 RX ORDER — ACETAMINOPHEN 650 MG/1
650 SUPPOSITORY RECTAL EVERY 6 HOURS PRN
Status: DISCONTINUED | OUTPATIENT
Start: 2022-08-22 | End: 2022-08-25 | Stop reason: HOSPADM

## 2022-08-22 RX ORDER — SUCRALFATE 1 G/1
1 TABLET ORAL
Status: DISCONTINUED | OUTPATIENT
Start: 2022-08-23 | End: 2022-08-25 | Stop reason: HOSPADM

## 2022-08-22 RX ADMIN — ATORVASTATIN CALCIUM 40 MG: 40 TABLET, FILM COATED ORAL at 21:59

## 2022-08-22 RX ADMIN — ARIPIPRAZOLE 5 MG: 5 TABLET ORAL at 21:59

## 2022-08-22 RX ADMIN — OXYCODONE AND ACETAMINOPHEN 1 TABLET: 5; 325 TABLET ORAL at 22:02

## 2022-08-22 RX ADMIN — SODIUM CHLORIDE, PRESERVATIVE FREE 10 ML: 5 INJECTION INTRAVENOUS at 21:59

## 2022-08-22 RX ADMIN — TRAZODONE HYDROCHLORIDE 50 MG: 50 TABLET ORAL at 22:02

## 2022-08-22 RX ADMIN — INSULIN GLARGINE 35 UNITS: 100 INJECTION, SOLUTION SUBCUTANEOUS at 22:02

## 2022-08-22 ASSESSMENT — PAIN SCALES - WONG BAKER: WONGBAKER_NUMERICALRESPONSE: 0

## 2022-08-22 ASSESSMENT — PAIN SCALES - GENERAL: PAINLEVEL_OUTOF10: 0

## 2022-08-23 LAB
ANION GAP SERPL CALCULATED.3IONS-SCNC: 10 MMOL/L (ref 4–16)
BASOPHILS ABSOLUTE: 0 K/CU MM
BASOPHILS RELATIVE PERCENT: 0.6 % (ref 0–1)
BUN BLDV-MCNC: 54 MG/DL (ref 6–23)
CALCIUM SERPL-MCNC: 7.4 MG/DL (ref 8.3–10.6)
CHLORIDE BLD-SCNC: 102 MMOL/L (ref 99–110)
CO2: 25 MMOL/L (ref 21–32)
CREAT SERPL-MCNC: 6.1 MG/DL (ref 0.9–1.3)
DIFFERENTIAL TYPE: ABNORMAL
EOSINOPHILS ABSOLUTE: 0.2 K/CU MM
EOSINOPHILS RELATIVE PERCENT: 2.8 % (ref 0–3)
ESTIMATED AVERAGE GLUCOSE: 157 MG/DL
GFR AFRICAN AMERICAN: 12 ML/MIN/1.73M2
GFR NON-AFRICAN AMERICAN: 10 ML/MIN/1.73M2
GLUCOSE BLD-MCNC: 118 MG/DL (ref 70–99)
GLUCOSE BLD-MCNC: 139 MG/DL (ref 70–99)
GLUCOSE BLD-MCNC: 146 MG/DL (ref 70–99)
GLUCOSE BLD-MCNC: 172 MG/DL (ref 70–99)
GLUCOSE BLD-MCNC: 179 MG/DL (ref 70–99)
HBA1C MFR BLD: 7.1 % (ref 4.2–6.3)
HCT VFR BLD CALC: 21.5 % (ref 42–52)
HEMOGLOBIN: 6.6 GM/DL (ref 13.5–18)
IMMATURE NEUTROPHIL %: 0.9 % (ref 0–0.43)
INR BLD: 1.08 INDEX
LYMPHOCYTES ABSOLUTE: 0.9 K/CU MM
LYMPHOCYTES RELATIVE PERCENT: 12.9 % (ref 24–44)
MCH RBC QN AUTO: 28.6 PG (ref 27–31)
MCHC RBC AUTO-ENTMCNC: 30.7 % (ref 32–36)
MCV RBC AUTO: 93.1 FL (ref 78–100)
MONOCYTES ABSOLUTE: 0.7 K/CU MM
MONOCYTES RELATIVE PERCENT: 9.7 % (ref 0–4)
NUCLEATED RBC %: 0 %
PDW BLD-RTO: 14.9 % (ref 11.7–14.9)
PLATELET # BLD: 229 K/CU MM (ref 140–440)
PMV BLD AUTO: 9.9 FL (ref 7.5–11.1)
POTASSIUM SERPL-SCNC: 4.3 MMOL/L (ref 3.5–5.1)
PROTHROMBIN TIME: 13.9 SECONDS (ref 11.7–14.5)
RBC # BLD: 2.31 M/CU MM (ref 4.6–6.2)
SEGMENTED NEUTROPHILS ABSOLUTE COUNT: 4.9 K/CU MM
SEGMENTED NEUTROPHILS RELATIVE PERCENT: 73.1 % (ref 36–66)
SODIUM BLD-SCNC: 137 MMOL/L (ref 135–145)
TOTAL IMMATURE NEUTOROPHIL: 0.06 K/CU MM
TOTAL NUCLEATED RBC: 0 K/CU MM
TROPONIN T: 0.12 NG/ML
WBC # BLD: 6.7 K/CU MM (ref 4–10.5)

## 2022-08-23 PROCEDURE — 6370000000 HC RX 637 (ALT 250 FOR IP): Performed by: HOSPITALIST

## 2022-08-23 PROCEDURE — 85610 PROTHROMBIN TIME: CPT

## 2022-08-23 PROCEDURE — 99223 1ST HOSP IP/OBS HIGH 75: CPT | Performed by: INTERNAL MEDICINE

## 2022-08-23 PROCEDURE — 6370000000 HC RX 637 (ALT 250 FOR IP): Performed by: INTERNAL MEDICINE

## 2022-08-23 PROCEDURE — 36415 COLL VENOUS BLD VENIPUNCTURE: CPT

## 2022-08-23 PROCEDURE — 90935 HEMODIALYSIS ONE EVALUATION: CPT

## 2022-08-23 PROCEDURE — 6370000000 HC RX 637 (ALT 250 FOR IP): Performed by: STUDENT IN AN ORGANIZED HEALTH CARE EDUCATION/TRAINING PROGRAM

## 2022-08-23 PROCEDURE — 84484 ASSAY OF TROPONIN QUANT: CPT

## 2022-08-23 PROCEDURE — 6360000002 HC RX W HCPCS: Performed by: INTERNAL MEDICINE

## 2022-08-23 PROCEDURE — 6360000002 HC RX W HCPCS: Performed by: STUDENT IN AN ORGANIZED HEALTH CARE EDUCATION/TRAINING PROGRAM

## 2022-08-23 PROCEDURE — 83036 HEMOGLOBIN GLYCOSYLATED A1C: CPT

## 2022-08-23 PROCEDURE — P9016 RBC LEUKOCYTES REDUCED: HCPCS

## 2022-08-23 PROCEDURE — 85018 HEMOGLOBIN: CPT

## 2022-08-23 PROCEDURE — 76937 US GUIDE VASCULAR ACCESS: CPT

## 2022-08-23 PROCEDURE — 86922 COMPATIBILITY TEST ANTIGLOB: CPT

## 2022-08-23 PROCEDURE — 80048 BASIC METABOLIC PNL TOTAL CA: CPT

## 2022-08-23 PROCEDURE — 82962 GLUCOSE BLOOD TEST: CPT

## 2022-08-23 PROCEDURE — 85025 COMPLETE CBC W/AUTO DIFF WBC: CPT

## 2022-08-23 PROCEDURE — 36430 TRANSFUSION BLD/BLD COMPNT: CPT

## 2022-08-23 PROCEDURE — 86901 BLOOD TYPING SEROLOGIC RH(D): CPT

## 2022-08-23 PROCEDURE — 2580000003 HC RX 258: Performed by: STUDENT IN AN ORGANIZED HEALTH CARE EDUCATION/TRAINING PROGRAM

## 2022-08-23 PROCEDURE — 85014 HEMATOCRIT: CPT

## 2022-08-23 PROCEDURE — 5A1D70Z PERFORMANCE OF URINARY FILTRATION, INTERMITTENT, LESS THAN 6 HOURS PER DAY: ICD-10-PCS | Performed by: INTERNAL MEDICINE

## 2022-08-23 PROCEDURE — 86850 RBC ANTIBODY SCREEN: CPT

## 2022-08-23 PROCEDURE — 86900 BLOOD TYPING SEROLOGIC ABO: CPT

## 2022-08-23 PROCEDURE — 1200000000 HC SEMI PRIVATE

## 2022-08-23 RX ORDER — SODIUM CHLORIDE 9 MG/ML
INJECTION, SOLUTION INTRAVENOUS PRN
Status: DISCONTINUED | OUTPATIENT
Start: 2022-08-23 | End: 2022-08-25 | Stop reason: HOSPADM

## 2022-08-23 RX ORDER — OXYCODONE HYDROCHLORIDE AND ACETAMINOPHEN 5; 325 MG/1; MG/1
1 TABLET ORAL EVERY 8 HOURS PRN
Status: DISCONTINUED | OUTPATIENT
Start: 2022-08-23 | End: 2022-08-25 | Stop reason: HOSPADM

## 2022-08-23 RX ORDER — OXYCODONE HYDROCHLORIDE AND ACETAMINOPHEN 5; 325 MG/1; MG/1
1.5 TABLET ORAL DAILY
COMMUNITY

## 2022-08-23 RX ADMIN — SUCRALFATE 1 G: 1 TABLET ORAL at 16:22

## 2022-08-23 RX ADMIN — PANTOPRAZOLE SODIUM 40 MG: 40 TABLET, DELAYED RELEASE ORAL at 06:18

## 2022-08-23 RX ADMIN — OXYCODONE AND ACETAMINOPHEN 1 TABLET: 5; 325 TABLET ORAL at 06:21

## 2022-08-23 RX ADMIN — ONDANSETRON 4 MG: 2 INJECTION INTRAMUSCULAR; INTRAVENOUS at 08:56

## 2022-08-23 RX ADMIN — OXYCODONE AND ACETAMINOPHEN 1 TABLET: 5; 325 TABLET ORAL at 21:58

## 2022-08-23 RX ADMIN — SODIUM CHLORIDE, PRESERVATIVE FREE 10 ML: 5 INJECTION INTRAVENOUS at 22:00

## 2022-08-23 RX ADMIN — SUCRALFATE 1 G: 1 TABLET ORAL at 06:18

## 2022-08-23 RX ADMIN — EPOETIN ALFA-EPBX 10000 UNITS: 10000 INJECTION, SOLUTION INTRAVENOUS; SUBCUTANEOUS at 10:33

## 2022-08-23 RX ADMIN — SODIUM CHLORIDE, PRESERVATIVE FREE 10 ML: 5 INJECTION INTRAVENOUS at 08:56

## 2022-08-23 RX ADMIN — ATORVASTATIN CALCIUM 40 MG: 40 TABLET, FILM COATED ORAL at 21:59

## 2022-08-23 RX ADMIN — ARIPIPRAZOLE 5 MG: 5 TABLET ORAL at 21:59

## 2022-08-23 RX ADMIN — CALCITRIOL CAPSULES 0.25 MCG 0.25 MCG: 0.25 CAPSULE ORAL at 08:45

## 2022-08-23 RX ADMIN — PANTOPRAZOLE SODIUM 40 MG: 40 TABLET, DELAYED RELEASE ORAL at 16:22

## 2022-08-23 RX ADMIN — INSULIN LISPRO 12 UNITS: 100 INJECTION, SOLUTION INTRAVENOUS; SUBCUTANEOUS at 18:34

## 2022-08-23 RX ADMIN — OXYCODONE AND ACETAMINOPHEN 1 TABLET: 5; 325 TABLET ORAL at 13:36

## 2022-08-23 RX ADMIN — TRAZODONE HYDROCHLORIDE 50 MG: 50 TABLET ORAL at 22:02

## 2022-08-23 RX ADMIN — INSULIN GLARGINE 35 UNITS: 100 INJECTION, SOLUTION SUBCUTANEOUS at 22:58

## 2022-08-23 RX ADMIN — SERTRALINE HYDROCHLORIDE 100 MG: 50 TABLET ORAL at 08:45

## 2022-08-23 ASSESSMENT — PAIN SCALES - GENERAL
PAINLEVEL_OUTOF10: 9
PAINLEVEL_OUTOF10: 1
PAINLEVEL_OUTOF10: 7
PAINLEVEL_OUTOF10: 6
PAINLEVEL_OUTOF10: 7

## 2022-08-23 ASSESSMENT — PAIN DESCRIPTION - LOCATION
LOCATION: LEG
LOCATION: LEG

## 2022-08-23 ASSESSMENT — PAIN DESCRIPTION - DIRECTION: RADIATING_TOWARDS: FEET

## 2022-08-23 ASSESSMENT — PAIN DESCRIPTION - PAIN TYPE: TYPE: ACUTE PAIN

## 2022-08-23 ASSESSMENT — PAIN DESCRIPTION - ONSET: ONSET: ON-GOING

## 2022-08-23 ASSESSMENT — PAIN DESCRIPTION - DESCRIPTORS: DESCRIPTORS: ACHING

## 2022-08-23 ASSESSMENT — PAIN - FUNCTIONAL ASSESSMENT: PAIN_FUNCTIONAL_ASSESSMENT: PREVENTS OR INTERFERES SOME ACTIVE ACTIVITIES AND ADLS

## 2022-08-23 ASSESSMENT — PAIN DESCRIPTION - FREQUENCY: FREQUENCY: CONTINUOUS

## 2022-08-23 ASSESSMENT — PAIN DESCRIPTION - ORIENTATION: ORIENTATION: RIGHT;LEFT;LOWER

## 2022-08-23 NOTE — PROGRESS NOTES
Patient tolerated 3 hour of hemodialysis treatment well today. 1.5L of fluid was removed via HD. CVC access in right subclavian was used without issue. Pt states \" access might be infected\", slight red color noted around access, pt stated they are using \"antibiotic cream prescription\". Epo was administered during HD and Lab drawn for PRBC. At the end of txt blood was rinsed back to pt successfully. CVC lines were flushed and locked with saline, then capped. HD txt overseen buy Conner Lowery RN. Patient Name: Yves Stevenson  Patient : 1975  MRN: [id number]     Acct: [de-identified]  Date of Admission: 2022  Room/Bed: 3014/3014-A  Code Status:  Full Code  Allergies:    Allergies   Allergen Reactions    Adhesive Tape Rash    Doxycycline Nausea And Vomiting    Reglan [Metoclopramide] Anxiety     Diagnosis:    Patient Active Problem List   Diagnosis    Hiatal hernia    Diabetes mellitus type 2, improved controlled    Diabetic neuropathy (HCC)    Panic attack    Anxiety associated with depression    Neck pain    DANIELA (obstructive sleep apnea)    Urinary frequency    Bilateral carpal tunnel syndrome- left worse than right    Hyperlipidemia with target LDL less than 100    Essential hypertension    Bronchitis    Osteomyelitis (HCC)    Abscess    Periumbilical hernia    Sepsis (HCC)    Cellulitis of left foot    Constipation    Intractable nausea and vomiting    Uncontrolled type 2 diabetes mellitus (HCC)    Nausea and vomiting    Diabetic foot infection (HCC)    Acute renal failure (ARF) (HCC)    Cellulitis    Cellulitis of left arm    Cellulitis, scrotum    Acute epididymitis    Irene gangrene    Recurrent major depressive disorder, in full remission (HCC)    Generalized anxiety disorder    Morbid obesity with body mass index (BMI) of 40.0 to 44.9 in adult [facility])    Necrotizing fasciitis (HCC)    Syncope    Right groin wound    Ulcer of right groin with fat layer exposed ([address]    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 type 2 diabetes mellitus, with muscle involvement without evidence of necrosis ([facility]    Nephrotic syndrome    Generalized edema    Stage 3b chronic kidney disease ([facility]    Diabetic nephropathy associated with type 2 diabetes mellitus (HCC)    Hypoalbuminemia    Chronic kidney disease, stage IV (severe) ([facility])    FH: CAD (coronary artery disease)    ASCVD (arteriosclerotic cardiovascular disease)    Other proteinuria    Chest pain    Surgical wound dehiscence    CARMEN (acute kidney injury) ([facility]    Anemia    Chest tightness         Treatment:  Hemodilaysis 2:1  Priority: Routine  Location: Acute Room    Diabetic: Yes  NPO: No  Isolation Precautions: None     Consent for Treatment Verified: Yes  Blood Consent Verified: Not Applicable     Safety Verified: Identify (I), Consent (C), Equipment (E), HepB Status (B), Orders Complete (O), Access Verified (A), and Timeliness (T)  Time out performed prior to access at 0940 hours. Report Received from Primary RN at 0636 hours.   Primary RN (First Initial, Last Name, Title): Werner Nuñez RN  Incapacitated Nurse Education Completed: Not Applicable     HBsAg ONLY:  Date Drawn: August 5, 2022       Results: Negative  HBsAb:  Date Drawn:  August 23, 2022       Results: Unknown    Order  Dialysis Bath  K+ (Potassium): 2  Ca+ (Calcium): 2.5  Na+ (Sodium): 138  HCO3 (Bicarb): 30     Na+ Modeling: Not Applicable  Dialyzer: H904  Dialysate Temperature (C):  35  Blood Flow Rate (BFR):  350   Dialysate Flow Rate (DFR):   700        Access to be Utilized   Access: Non-tunneled Catheter  Location: Subclavian  Side: Right   Needle gauge:  Not Applicable  + Bruit/Thrill: Not Applicable    First Use X-ray Verified: Yes  OK to use line order: Yes    Site Assessment:  Signs and Symptoms of Infection/Inflammation:  redness at insertion site, has medication  If yes: Not Applicable  Dressing: Dry and Intact  Site Prep: Medical Aseptic Technique  Dressing Changed this Treatment: No  If yes, by whom: NA - not changed today  Date of Last Dressing Change: August 22, 2022  Antimicrobial Patch in place?: Yes  Red Alcohol Caps in place?: Yes  Gauze Dressing?: No  Non Dialysis Use?: No  Comment:    Flows: Good, Patent  If access problem, who was notified:     Pre and Post-Assessment  Patient Vitals for the past 8 hrs:   Level of Consciousness Pain Level   08/23/22 0621 -- 9   08/23/22 0830 Alert (0) --     Labs  Recent Labs     08/23/22  0712   WBC 6.7   HGB 6.6*   HCT 21.5*                                                                     Recent Labs     08/22/22  0800 08/23/22  0712    137   K 4.3 4.3    102   CO2 23 25   BUN 68* 54*   CREATININE 7.1* 6.1*   GLUCOSE 127* 146*     IV Drips and Rate/Dose   sodium chloride      sodium chloride      dextrose        Safety - Before each treatment:   Dialysis Machine No.: [id number]   Machine Number: [id number]  Dialyzer Lot No.: [id number]   Machine Log Sheet Completed: Yes  Machine Alarm Self Test: Completed; Passed (08/23/22 0940)     Air Foam Detector: Tested, Proper Function  Extracorporeal Circuit Tested for Integrity: Yes  Machine Conductivity: 13.9  Manual Conductivity: 13.9           Manual Ph: 7.6  Bleach Test (Neg): Yes  Bath Temperature: 95 °F (35 °C)  Tubing Lot#: [id number]  Conductivity Meter Serial #: [id number]  All Connections Secure?: Yes  Venous Parameters Set?: Yes  Arterial Parameters Set?: Yes  Saline Line Double Clamped?: Yes  Air Foam Detector Engaged?: Yes  Machine Functioning Alarm Free?  Yes  Prime Given: 200ml    Chlorine Testing - Before each treatment and every 4 hours:   Treatment  Time On: 0942  Time Off: 1245  Treatment Goal: 1.5  Weight: (!) 306 lb 6.4 oz (139 kg) (08/23/22 1245)  1st check: less than 0.1 ppm at: 0650 hours  2nd check: less than 0.1 ppm at: 1007 hours  3rd check: Not Applicable  (if greater than 0.1 ppm, then check every 30 minutes from secondary)    Access Flows and Pressures  Patient Vitals for the past 8 hrs:   Blood Flow Rate (ml/min) Ultrafiltration Rate (ml/hr) Arterial Pressure (mmHg) Venous Pressure (mmHg) TMP DFR Comments Access Visible   08/23/22 0942 200 ml/min 1000 ml/hr -20 mmHg 30 50 700 txt started Yes   08/23/22 1000 350 ml/min 660 ml/hr -90 mmHg 120 40 700 VSS, type and cross drawn Yes   08/23/22 1015 350 ml/min 660 ml/hr -100 mmHg 120 40 700 resting with eyes closed Yes   08/23/22 1030 350 ml/min 660 ml/hr -100 mmHg 120 40 700 no distress Yes   08/23/22 1045 350 ml/min 660 ml/hr -100 mmHg 120 40 700 no change Yes   08/23/22 1100 350 ml/min 660 ml/hr -100 mmHg 120 40 700 resting quietly Yes   08/23/22 1115 350 ml/min 660 ml/hr -100 mmHg 120 40 700 no change Yes   08/23/22 1130 350 ml/min 660 ml/hr -110 mmHg 120 40 700 denies needs Yes   08/23/22 1145 350 ml/min 660 ml/hr -110 mmHg 120 40 700 new bicarb Yes   08/23/22 1200 350 ml/min 660 ml/hr -110 mmHg 130 40 700 VSS Yes   08/23/22 1215 350 ml/min 660 ml/hr -110 mmHg 120 40 700 no complaints Yes   08/23/22 1245 350 ml/min -- -- -- -- -- txt completed --     Vital Signs  Patient Vitals for the past 8 hrs:   BP Temp Pulse Resp SpO2 Weight Weight Method Percent Weight Change   08/23/22 0830 130/88 98.1 °F (36.7 °C) -- -- 91 % -- -- --   08/23/22 0930 130/88 -- 85 (!) 9 -- -- -- --   08/23/22 0942 137/78 97.7 °F (36.5 °C) 84 16 -- (!) 310 lb 11.2 oz (140.9 kg) Bed scale 1.27   08/23/22 1000 138/87 -- 83 -- -- -- -- --   08/23/22 1015 (!) 151/98 -- 82 -- -- -- -- --   08/23/22 1030 (!) 149/78 -- 84 -- -- -- -- --   08/23/22 1045 126/87 -- 87 -- -- -- -- --   08/23/22 1100 129/86 -- 81 -- -- -- -- --   08/23/22 1115 93/62 -- 82 -- -- -- -- --   08/23/22 1130 120/74 -- 81 -- -- -- -- --   08/23/22 1145 116/60 -- 82 -- -- -- -- --   08/23/22 1200 125/71 -- 82 -- -- -- -- --   08/23/22 1215 123/65 -- 81 -- -- -- -- --   08/23/22 1245 124/76 98.2 °F (36.8 °C) 82 20 -- (!) 306 lb 6.4 oz (139 kg) Bed scale -1.38 Post-Dialysis  Arterial Catheter Locking Solution:  saline  Venous Catheter Locking Solution:  saline  Post-Treatment Procedures: Blood returned, Catheter Capped, clamped with Saline x2 ports  Machine Disinfection Process: Acid/Vinegar Clean, Heat Disinfect, Exterior Machine Disinfection  Rinseback Volume (ml): 300 ml  Blood Volume Processed (Liters): 60.8 l/min  Dialyzer Clearance: Moderately streaked  Duration of Treatment (minutes): 180 minutes     Hemodialysis Intake (ml): 500 ml  Hemodialysis Output (ml): 2000 ml     Tolerated Treatment: Good  Patient Response to Treatment: good  Physician Notified: No       Provider Notification        Handoff complete and report given to Primary RN at 1254 hours. Primary RN (First Initial, Last Name, Title):   Kamron MOSES     Education  Person Educated: Patient   Knowledge Base: Substantial  Barriers to Learning?: None  Preferred method of Learning: Oral  Topic(s): Access Care, Signs and Symptoms of Infection, Fluid Management, and Medications   Teaching Tools: Explanation   Response to Education: Verbalized Understanding     Electronically signed by Saskia Nash LPN on 8/91/0196 at 0:49 PM

## 2022-08-23 NOTE — CONSULTS
[facility]  Gastroenterology Consultation    2022  9:14 AM    Patient:    Andra Webster  : 1975   52 y.o. MRN: [id number]  Admitted: 2022  7:59 PM ATT: Gloria Pacheco MD   [id number]  AdmitDx: Chest tightness [R07.89]  Chest pain [R07.9]  PCP: Lemuel Bedoya MD    Reason for Consult:  anemia     Requesting Physician:  Gloria Pacheco MD    History Obtained From:  Patient and review of all records    HISTORY OF PRESENT ILLNESS:                The patient is a 52 y.o. male with significant   Past Medical History:   Diagnosis Date    Abscess     scrotal    Acute renal failure (ARF) ([address] 2019    Anxiety associated with depression     Anxiety associated with depression     Back pain 2012    Chest pain 2013    Diabetic nephropathy ([address]     Diabetic neuropathy ([address] 2011    Diabetic ulcer of left midfoot associated with type 2 diabetes mellitus, with fat layer exposed ([address] 2017    Diverticulosis     C scope + Dr. Aldon Schilder    DM (diabetes mellitus), type 2 ([address]     DR. 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 Aldon Schilder    Necrotizing fasciitis [facility])     Nose fracture     Panic attacks     Pericarditis     Hospitalized with s/p heart cath normal    Peripheral autonomic neuropathy due to diabetes mellitus ([address]     Axonal EMG- NCS, 2011    Sebaceous cyst 2011    URI (upper respiratory infection) 2012    WD-Wound, surgical, infected, initial encounter 2017    Wrist fracture     who presented to [facility] ED with chest tightness during hemodialysis, resolved after completion. GI consult for anemia. Hgb 6.1 on admission.  Abdominal pain  N/V, GERD, dyspepsia, dysphagia   Last BM yesterday, non bloody    History of EGD 7/20/2022 Sandra-Roberson tear no active bleed  History of colonoscopy     ASA 81 mg daily   No NSAIDs  Eliquis 2.5 mg daily, Plavix 75 mg daily     Current Smoker  Denies Alcohol intake    Past Medical History:        Diagnosis Date    Abscess 2010    scrotal    Acute renal failure (ARF) ([facility] 08/04/2019    Anxiety associated with depression     Anxiety associated with depression     Back pain 07/02/2012    Chest pain 05/01/2013    Diabetic nephropathy ([facility]     Diabetic neuropathy ([address] 12/22/2011    Diabetic ulcer of left midfoot associated with type 2 diabetes mellitus, with fat layer exposed ([address] 07/18/2017    Diverticulosis     C scope + Dr. Aldon Schilder    DM (diabetes mellitus), type 2 ([facility] 2002    DR. 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 Aldon Schilder    Necrotizing fasciitis [facility])     Nose fracture 1988    Panic attacks     Pericarditis 2003    Hospitalized with s/p heart cath normal    Peripheral autonomic neuropathy due to diabetes mellitus ([facility]     Axonal EMG- NCS, March 2011    Sebaceous cyst 09/01/2011    URI (upper respiratory infection) 02/27/2012    WD-Wound, surgical, infected, initial encounter 12/08/2017    Wrist fracture 1986       Past Surgical History:        Procedure Laterality Date    ABDOMEN SURGERY      CARDIAC CATHETERIZATION  2003, 2013    Normal (Dr. Fallon Salcido)    COLONOSCOPY  6/2/2011    Pandiverticulosis, Nonbleeding internal hemorrhoids, Repeat colonoscopy at age 48- Dr. Zack Velásquez Left 12/21/2021    David Alcantara performed by Jaylin Melo DPM at [address]    IR NONTUNNELED VASCULAR CATHETER  7/25/2022    IR NONTUNNELED VASCULAR CATHETER 7/25/2022 [facility] SPECIAL PROCEDURES    IR TUNNELED CATHETER PLACEMENT GREATER THAN 5 YEARS  7/27/2022    IR TUNNELED CATHETER PLACEMENT GREATER THAN 5 YEARS 7/27/2022 [facility] SPECIAL PROCEDURES    NOSE SURGERY  1993    \"had reconstruction surgery on nose a year after the other nose surgery\"    OTHER SURGICAL HISTORY Right 05/22/2015    I & D right great toe with partial amputation    OTHER SURGICAL HISTORY  12/04/2017    inc and drainage of abcess    PA DEEP INCIS FOOT BONE INFECTN Left 9/22/2018    LEFT FOOT DEBRIDEMENT INCISION AND DRAINAGE TOP AND BOTTOM performed by Lyla Salgado DPM at [place] [address] 5/15/2021    SCROTAL AND PERINEAL DEBRIDEMENT performed by Sandro De Leon MD at [place] [address] 5/16/2021    SCROTAL RE-DEBRIDEMENT  INCISION AND DRAINAGE performed by Sandro De Leon MD at [address] [place] [address] [id number]    sebaceous cyst removal times 4     TOE AMPUTATION      right great toe    TOE AMPUTATION Right 3/23/2019    TOE AMPUTATION RIGHT 5TH TOE performed by Lyla Salgado DPM at [address] Right 8/6/2019    TOE AMPUTATION RIGHT 4TH TOE performed by Lyla Salgado DPM at [address] [place] [address] ENDOSCOPY  06/2/2011    Mild gastritis with moderatley severe antritis, small hiatal hernia, Dr. Mayra Anderson N/A 1/12/2019    EGD BIOPSY performed by Hannah Agustin MD at [address] [place] [address] 7/26/2022    EGD DIAGNOSTIC ONLY performed by Hannah Agustin MD at [facility] ENDOSCOPY       Current Medications:    Medications    Scheduled Medications:    ARIPiprazole  5 mg Oral Nightly    atorvastatin  40 mg Oral Nightly    calcitRIOL  0.25 mcg Oral Daily    pantoprazole  40 mg Oral BID AC    sertraline  100 mg Oral Daily    sucralfate  1 g Oral BID AC    sodium chloride flush  5-40 mL IntraVENous 2 times per day    insulin glargine  0.25 Units/kg SubCUTAneous Nightly    insulin lispro  12 Units SubCUTAneous TID WC    insulin lispro  0-4 Units SubCUTAneous TID WC    insulin lispro  0-4 Units SubCUTAneous Nightly     PRN Medications: sodium chloride, traZODone, sodium chloride flush, sodium chloride, ondansetron **OR** ondansetron, polyethylene glycol, aluminum & magnesium hydroxide-simethicone, acetaminophen **OR** acetaminophen, glucose, dextrose bolus **OR** dextrose bolus, glucagon (rDNA), dextrose, nitroGLYCERIN, oxyCODONE-acetaminophen      Allergies:  Adhesive tape, Doxycycline, and Reglan [metoclopramide]    Social History:   TOBACCO:   reports that he has been smoking cigarettes. He has never used smokeless tobacco.  ETOH:   reports that he does not currently use alcohol. Family History:       Problem Relation Age of Onset    Cancer Mother         ?site    Stroke Mother     Bleeding Prob Mother     Diabetes Father     Heart Disease Father     High Blood Pressure Father     Obesity Father     Kidney Disease Father     Diabetes Sister     High Blood Pressure Sister     Mental Illness Sister     Obesity Sister     High Blood Pressure Other     Mental Illness Other         bipolar    Other Son         cyst in ear canal    ADHD Daughter        family history of colon cancer-dad, Crohn's disease, or ulcerative colitis. REVIEW OF SYSTEMS:    The positive ROS will be identified in bold, otherwise ROS are negative     CONSTITUTIONAL:  Neg   Recent weight changes, fatigue, fever, chills or night sweats  EYES:  Neg  Blurriness, earing, itching or acute change in vision  EARS:  Neg  hearing loss, tinnitus, vertigo, discharge or earache. NOSE:  Neg  Rhinorrhea, sneezing, itching, allergy or epistaxis  MOUTH/THROAT:  Neg  bleeding gums, hoarseness or sore throat.   RESPIRATORY:   Neg SOB, wheeze, cough, sputum, hemoptysis or bronchitis  CARDIOVASCULAR:  Neg chest pain, palpitations, dyspnea on exertion, orthopnea, paroxysmal nocturnal dyspnea or edema  GASTROINTESTINAL:  SEE HPI  GENITOURINARY:  Neg  Urinary frequency, hesitancy, urgency, polyuria, dysuria, hematuria, nocturia, or incontinence. HEMATOLOGIC/LYMPHATIC:  Neg  Anemia, bleeding tendency  MUSCULOSKELETAL:    New myalgias, bone pain, joint pain, swelling or stiffness and has had change in gait. NEUROLOGICAL:  Neg  Loss of Consciousness, memory loss, forgetfulness, periods of confusion, difficulty concentrating, seizures, decline in intellect, nervousness, insomina, aphasia or dysarthria. SKIN:  Neg  skin or hair changes, and has no itching, rashes, or sores. PSYCHIATRIC:  Neg depression, personality changes, anxiety. ENDOCRINE:  Neg polydipsia, polyuria, abnormal weight changes, heat /cold intolerance.   ALL/IMM:  Neg reactions to drugs other than listed    PHYSICAL EXAM:      Vitals:    /88   Pulse 88   Temp 98.1 °F (36.7 °C) (Oral)   Resp 17   Ht 5' 9\" (1.753 m)   Wt (!) 306 lb 12.8 oz (139.2 kg)   SpO2 91%   BMI 45.31 kg/m²     General Appearance:    Alert, cooperative, no distress, appears stated age   HEENT:    Normocephalic, atraumatic, Conjunctiva clear, Lips, mucosa, and tongue normal; teeth and gums normal   Neck:   Supple, symmetrical, trachea midline   Lungs:     Clear to auscultation bilaterally, respirations unlabored   Chest Wall:    No tenderness or deformity    Heart:    Regular rate and rhythm, S1 and S2 normal, no murmur, rub   or gallop   Abdomen:     Soft, generalized tenderness, bowel sounds active all four quadrants,     no masses, no organomegaly, no ascites    Rectal:    Deferred   Extremities:   Extremities normal, atraumatic, no cyanosis or edema   Pulses:   2+ and symmetric all extremities   Skin:   Skin color, texture, turgor normal, no rashes or lesions   Lymph nodes:   No abnormality   Neurologic:   No focal deficits, moving all four extremities        DATA:    ABGs:   Lab Results   Component Value Date/Time    PO2ART 119 06/18/2013 01:15 PM    XTG3TLB 47.0 06/18/2013 01:15 PM     CBC:   Recent Labs     08/23/22  0712   WBC 6.7   HGB 6.6*    BMP:    Recent Labs     08/22/22  0800      K 4.3      CO2 23   BUN 68*   CREATININE 7.1*   GLUCOSE 127*     Magnesium:   Lab Results   Component Value Date/Time    MG 2.4 06/11/2021 06:09 AM     Hepatic: No results for input(s): AST, ALT, ALB, BILITOT, ALKPHOS in the last 72 hours. No results for input(s): LIPASE, AMYLASE in the last 72 hours. Recent Labs     08/23/22  0712   PROTIME 13.9   INR 1.08     No results for input(s): PTT in the last 72 hours. Lipids: No results for input(s): CHOL, HDL in the last 72 hours.     Invalid input(s): LDLCALCU  INR:   Recent Labs     08/23/22  0712   INR 1.08     TSH: No results found for: TSH  No intake or output data in the 24 hours ending 08/23/22 0914   sodium chloride      sodium chloride      dextrose         Imaging Studies:reviewed       IMPRESSION:      Patient Active Problem List   Diagnosis Code    Hiatal hernia K44.9    Diabetes mellitus type 2, improved controlled E11.65    Diabetic neuropathy ([facility] E11.40    Panic attack F41.0    Anxiety associated with depression F41.8    Neck pain M54.2    DANIELA (obstructive sleep apnea) G47.33    Urinary frequency R35.0    Bilateral carpal tunnel syndrome- left worse than right G56.03    Hyperlipidemia with target LDL less than 100 E78.5    Essential hypertension I10    Bronchitis J40    Osteomyelitis (HCC) M86.9    Abscess I74.55    Periumbilical hernia X06.7    Sepsis ([facility] A41.9    Cellulitis of left foot L03.116    Constipation K59.00    Intractable nausea and vomiting R11.2    Uncontrolled type 2 diabetes mellitus (HCC) E11.65    Nausea and vomiting R11.2    Diabetic foot infection (HCC) E11.628, L08.9    Acute renal failure (ARF) ([facility]) N17.9    Cellulitis L03.90    Cellulitis of left arm L03.114    Cellulitis, scrotum N49.2    Acute epididymitis N45.1    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    Recurrent major depressive disorder, in full remission ([facility] F33.42    Generalized anxiety disorder F41.1    Morbid obesity with body mass index (BMI) of 40.0 to 44.9 in adult [facility]) E66.01, Z68.41    Necrotizing fasciitis ([facility] M72.6    Syncope R55    Right groin wound S31.109A    Ulcer of right groin with fat layer exposed ([facility] L98.492    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 type 2 diabetes mellitus, with muscle involvement without evidence of necrosis (HCC) E11.621, L97.425    Nephrotic syndrome N04.9    Generalized edema R60.1    Stage 3b chronic kidney disease (HCC) N18.32    Diabetic nephropathy associated with type 2 diabetes mellitus (HCC) E11.21    Hypoalbuminemia E88.09    Chronic kidney disease, stage IV (severe) (HCC) N18.4    FH: CAD (coronary artery disease) Z82.49    ASCVD (arteriosclerotic cardiovascular disease) I25.10    Other proteinuria R80.8    Chest pain R07.9    Surgical wound dehiscence T81.31XA    CARMEN (acute kidney injury) ([facility] N17.9    Anemia D64.9    Chest tightness R07.89       ASSESSMENT/RECOMMENDATIONS:    Anemia:  Acute on chronic  Hgb 6.6, bun/creat 54/6.1  No gross bleeding  EGD 7/20/22 sandra Roberson tear, no bleeding  May be anemia of chronic disease    Recommend:  Monitor H&H, transfuse to keep Hgb greater than 7  Protonix twice daily  No egd at this time   Colonoscopy as outpatient     Discussed plan of care with patient and RN     Patient clinical, biochemical, and radiological information discussed with Dr. Sukumar Godinez. He agrees with the assessment and plan. APPLE Li - CNP, CNP  8/23/2022  9:14 AM     Acute on chronic anemia no overt bleeding  Recent hemodialysis  Recent EGD Sandra-Roberson tear  At this point no evidence of active bleed normal BMs color  Treat symptomatically  GI work-up as outpatient in future    I have seen and examined the patient myself. I have reviewed the hospital care given to date and reviewed all pertinent labs and imaging. The plan was developed mutually at the time of visit with the patient, Dahiana Taylor CNP and myself.  I have spoken with the patient, nursing staff and provided written and verbal instructions.      The above note has been reviewed and I agree with the assessment, diagnosis, and treatment plan with as suggested by Javed Morris CNP with changes made by me as above      [facility] gastroenterology

## 2022-08-23 NOTE — H&P
History and Physical      Name:  Conner Chun /Age/Sex: 1975  (52 y.o. male)   MRN & CSN:  [id number] & [id number] Encounter Date/Time: 2022 9:20 PM EDT   Location:  [id number]- PCP: Negar Huntley MD       Hospital Day: 1    Assessment and Plan:     Patient is a 77-year-old male who presented with chest tightness during HD treatment. # Chest tightness  - Presented with chest tightness during HD treatment which resolved after completion. Has similar symptoms during previous treatments. Denied any chest pain or syncope. - Likely secondary to volume shifts during HD treatment in the setting of significant anemia. # Anemia  - No evidence of overt bleeding, no repeat melena. - EGD in  shows Sandra-Roberson tear with no active bleeding. Also showed normal stomach, esophagus and duodenum.  - Hg 9.7 on , 7.2 on admission.  - Held ASA, Plavix and Eliquis in the setting of worsening anemia. Continued Protonix BID with Carafate.  - Follow-up repeat labs. GI consulted. NPO. # NSTEMI, type II  - Denied any current CP.  - Initial Tn mildly elevated, repeat pleatued (Care Everywhere). ECG without acute ischemic changes.  - Normal Lexiscan on . TTE  without WMA or decreased LVEF.  - Follow-up repeat Tn. Telemetry. SL NTG prn.   - Cardiology consulted, follow-up. # ESRD on HD since   - Scheduled MWF, last treatment on .  - Nephrology consulted. # T2DM  - Last A1c 10.0% recently on .  - Held home Alogliptin. - Decreased home Insulin to 35 qhs and 12 u TIDWM due to severe renal impairment. - Endocrinology consulted, follow-up. Checklist:  Advanced directive: full  Antibiotics: N/A  Catheter: none  DVT ppx: held in setting of worsening anemia  Nutrition/Diet: cardiac / renal / diabetic  Sugar: BG goal of 140-180 while inpatient    Disposition: patient requires continued admission due to chest tightness. MDM: moderate.      History of Present Illness:     Chief bleeding. GI: +BS x4. Soft. NT/ND. : No CVA tenderness. No Luna catheter. No suprapubic tenderness or masses appreciated. Ext: No edema. Peripheral pulses intact. Skin: Intact. Normal coloration, warm, dry. MSK: No gross joint deformities. Full ROM. Muscle strength is 5/5 B/L. Neuro: CNs grossly intact. Normal speech. No focal deficit. Psych: Good judgement and reason. Past History: PMHx:   Past Medical History:   Diagnosis Date    Abscess 2010    scrotal    Acute renal failure (ARF) ([address] 08/04/2019    Anxiety associated with depression     Anxiety associated with depression     Back pain 07/02/2012    Chest pain 05/01/2013    Diabetic nephropathy ([address]     Diabetic neuropathy ([address] 12/22/2011    Diabetic ulcer of left midfoot associated with type 2 diabetes mellitus, with fat layer exposed ([address] 07/18/2017    Diverticulosis     C scope + Dr. Kristin Elkins    DM (diabetes mellitus), type 2 ([address] 2002    DR. 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 Kristin Elkins    Necrotizing fasciitis [facility])     Nose fracture 1988    Panic attacks     Pericarditis 2003    Hospitalized with s/p heart cath normal    Peripheral autonomic neuropathy due to diabetes mellitus ([address]     Axonal EMG- NCS, March 2011    Sebaceous cyst 09/01/2011    URI (upper respiratory infection) 02/27/2012    WD-Wound, surgical, infected, initial encounter 12/08/2017    Wrist fracture 1986       PSHx:   Past Surgical History:   Procedure Laterality Date    ABDOMEN SURGERY      CARDIAC CATHETERIZATION  2003, 2013    Normal (Dr. Samuel Fountain)    COLONOSCOPY  6/2/2011    Pandiverticulosis, Nonbleeding internal hemorrhoids, Repeat colonoscopy at age 48- Dr. Mitzy Walker Left 12/21/2021    Aram Murdock performed by Fallon Delaney DPM at [facility] NONTUNNELED VASCULAR CATHETER  7/25/2022    IR NONTUNNELED VASCULAR CATHETER 7/25/2022 [facility] SPECIAL PROCEDURES    IR TUNNELED CATHETER PLACEMENT GREATER THAN 5 YEARS  7/27/2022    IR TUNNELED CATHETER PLACEMENT GREATER THAN 5 YEARS 7/27/2022 [facility] SPECIAL PROCEDURES    NOSE SURGERY  1993    \"had reconstruction surgery on nose a year after the other nose surgery\"    OTHER SURGICAL HISTORY Right 05/22/2015    I & D right great toe with partial amputation    OTHER SURGICAL HISTORY  12/04/2017    inc and drainage of abcess    [place] DEEP INCIS FOOT BONE INFECTN Left 9/22/2018    LEFT FOOT DEBRIDEMENT INCISION AND DRAINAGE TOP AND BOTTOM performed by Korina Vera DPM at [facility] N/A 5/15/2021    SCROTAL AND PERINEAL DEBRIDEMENT performed by Sancho De Jesus MD at [place] [address] 5/16/2021    SCROTAL RE-DEBRIDEMENT  INCISION AND DRAINAGE performed by Sancho De Jesus MD at [address] [place] [address] [phone]    sebaceous cyst removal times 4     TOE AMPUTATION      right great toe    TOE AMPUTATION Right 3/23/2019    TOE AMPUTATION RIGHT 5TH TOE performed by Korina Vera DPM at [address] Right 8/6/2019    TOE AMPUTATION RIGHT 4TH TOE performed by Korina Vera DPM at [address] [facility] ENDOSCOPY  06/2/2011    Mild gastritis with moderatley severe antritis, small hiatal hernia, Dr. Jude Oneal N/A 1/12/2019    EGD BIOPSY performed by Minda Rivas MD at [place] [address] N/A 7/26/2022    EGD DIAGNOSTIC ONLY performed by Minda Rivas MD at [facility] ENDOSCOPY       Allergies:    Allergies   Allergen Reactions    Adhesive Tape Rash    Doxycycline Nausea And Vomiting    Reglan [Metoclopramide] Anxiety       FHx: family history includes ADHD in his daughter; Bleeding Prob in his mother; Cancer in his mother; Diabetes in his father and sister; Heart Disease in his father; High Blood Pressure in his father, sister, and another family member; Kidney Disease in his father; Mental Illness in his sister and another family member; Obesity in his father and sister; Other in his son; Stroke in his mother. SHx:   Social History     Socioeconomic History    Marital status:    Tobacco Use    Smoking status: Some Days     Years: 20.00     Types: Cigarettes    Smokeless tobacco: Never    Tobacco comments:     Smokes when drinking/social   Vaping Use    Vaping Use: Never used   Substance and Sexual Activity    Alcohol use: Not Currently     Comment: occ    Drug use: Yes     Frequency: 7.0 times per week     Types: Marijuana Francesalina Chandler)     Comment: 12/20/21 @ 2000    Sexual activity: Yes     Partners: Female       Medications Prior to Admission     Prior to Admission medications    Medication Sig Start Date End Date Taking? Authorizing Provider   traZODone (DESYREL) 50 MG tablet Take 1 tablet by mouth nightly as needed for Sleep 8/3/22   Ismael Samuel MD   alogliptin (NESINA) 6.25 MG TABS tablet Take 1 tablet by mouth in the morning. 8/4/22   Ismael Samuel MD   insulin lispro, 1 Unit Dial, (HUMALOG KWIKPEN) 100 UNIT/ML SOPN Inject 20 Units into the skin in the morning and 20 Units at noon and 20 Units in the evening. Inject before meals. 8/3/22   Ismael Samuel MD   insulin glargine (LANTUS SOLOSTAR) 100 UNIT/ML injection pen Inject 45 Units into the skin nightly 8/3/22   Ismael Samuel MD   ARIPiprazole (ABILIFY) 5 MG tablet Take 1 tablet by mouth at bedtime 8/3/22   Ismael Samuel MD   pantoprazole (PROTONIX) 40 MG tablet Take 1 tablet by mouth in the morning and 1 tablet in the evening. Take before meals. 8/3/22   Ismael Samuel MD   sucralfate (CARAFATE) 1 GM tablet Take 1 tablet by mouth in the morning and 1 tablet in the evening. Take before meals.  8/3/22   Ismael Samuel MD   calcitRIOL (ROCALTROL) 0.25 MCG capsule Take 1 capsule by mouth in the morning. 8/4/22   Cecilia Cordoba MD   apixaban (ELIQUIS) 2.5 MG TABS tablet Take 1 tablet by mouth 2 times daily 10/1/21   APPLE Richey - CNP   clopidogrel (PLAVIX) 75 MG tablet Take 1 tablet by mouth daily 10/2/21   APPLE Richey - CNP   furosemide (LASIX) 40 MG tablet Take 1 tablet by mouth daily 9/22/21 8/3/22  Nini Starks MD   insulin aspart (NOVOLOG) 100 UNIT/ML injection vial Inject 35 Units into the skin 3 times daily (before meals)  8/3/22  Deejay Devine MD   atorvastatin (LIPITOR) 40 MG tablet Take 1 tablet by mouth nightly 6/15/21   Champ Stanley MD   metoprolol tartrate (LOPRESSOR) 25 MG tablet Take 1 tablet by mouth 2 times daily 6/15/21 8/3/22  Champ Stanley MD   amLODIPine (NORVASC) 10 MG tablet Take 1 tablet by mouth daily 6/15/21 8/3/22  Champ Stanley MD   chlorthalidone (HYGROTON) 25 MG tablet Take 1 tablet by mouth daily 6/15/21 8/3/22  Champ Stanley MD   sertraline (ZOLOFT) 50 MG tablet Take 2 tablets by mouth daily 5/29/21   Champ Stanley MD   aspirin 81 MG EC tablet Take 1 tablet by mouth daily 4/5/21   Fredy Caro MD   linagliptin (TRADJENTA) 5 MG tablet Take 1 tablet by mouth daily 3/29/21 8/3/22  Champ Stanley MD   ondansetron (ZOFRAN) 4 MG tablet Take 1 tablet by mouth every 8 hours as needed for Nausea or Vomiting 9/23/18   Selin Morrison MD   Lancets MISC 1 each by Does not apply route daily 9/21/18   Selin Morrison MD   glucose monitoring kit (FREESTYLE) monitoring kit 1 each by Does not apply route once for 1 dose. 6/20/13 6/20/13  Roxy Carter MD       Data:     CBC: No results for input(s): WBC, HGB, PLT, MCV, RDW, BANDSPCT, BLASTSPCT, METASPCT, LYMPHOPCT, PROMYELOPCT, MONOPCT, MYELOPCT, EOSPCT, BASOPCT, MONOSABS, LYMPHSABS, EOSABS, BASOSABS in the last 72 hours.     Invalid input(s): MONO PINEDA  CMP:    Recent Labs     08/22/22  0800      K 4.3      CO2 23   BUN 68*   CREATININE 7.1*   GFRAA 10*   GLUCOSE 127*   CALCIUM 7.6*     Lipids:   Lab Results   Component Value Date/Time    CHOL 145 05/13/2021 10:28 AM    CHOL 168 10/15/2013 10:30 AM    HDL 29 05/13/2021 10:28 AM    TRIG 188 05/13/2021 10:28 AM     Hemoglobin A1C:   Lab Results   Component Value Date/Time    LABA1C 10.0 07/25/2022 06:50 AM     TSH: No results found for: TSH  Troponin:   Lab Results   Component Value Date/Time    TROPONINT 0.096 07/25/2022 06:50 AM    TROPONINT 0.097 07/24/2022 08:18 PM    TROPONINT 0.044 12/21/2021 04:25 PM     BNP: No results for input(s): PROBNP in the last 72 hours. Lactic Acid: No results for input(s): LACTA in the last 72 hours.   UA:  Lab Results   Component Value Date/Time    NITRU NEGATIVE 07/24/2022 11:40 PM    COLORU YELLOW 07/24/2022 11:40 PM    PHUR 6.5 06/20/2013 09:51 AM    WBCUA 2 07/24/2022 11:40 PM    RBCUA 1 07/24/2022 11:40 PM    MUCUS RARE 07/24/2022 11:40 PM    TRICHOMONAS NONE SEEN 07/24/2022 11:40 PM    BACTERIA NEGATIVE 07/24/2022 11:40 PM    CLARITYU CLEAR 07/24/2022 11:40 PM    SPECGRAV 1.020 07/24/2022 11:40 PM    LEUKOCYTESUR NEGATIVE 07/24/2022 11:40 PM    UROBILINOGEN 0.2 07/24/2022 11:40 PM    BILIRUBINUR NEGATIVE 07/24/2022 11:40 PM    BILIRUBINUR neg 06/20/2013 09:51 AM    BLOODU SMALL NUMBER OR AMOUNT OBSERVED 07/24/2022 11:40 PM    GLUCOSEU 1,000 06/20/2013 09:51 AM    KETUA NEGATIVE 07/24/2022 11:40 PM    AMORPHOUS RARE 07/24/2022 11:40 PM     Urine Cultures: No results found for: LABURIN  Blood Cultures: No results found for: BC  No results found for: BLOODCULT2  Organism:   Lab Results   Component Value Date/Time    ORG SAUR 01/07/2019 12:08 AM       Radiology results:  No orders to display       Medications:     Medications:    ARIPiprazole  5 mg Oral Nightly    atorvastatin  40 mg Oral Nightly    [START ON 8/23/2022] calcitRIOL  0.25 mcg Oral Daily    [START ON 8/23/2022] pantoprazole  40 mg Oral BID AC    [START ON 8/23/2022] sertraline  100 mg Oral Daily    [START ON 8/23/2022] sucralfate  1 g Oral BID AC    sodium chloride flush  5-40 mL IntraVENous 2 times per day    insulin glargine  0.25 Units/kg SubCUTAneous Nightly    [START ON 8/23/2022] insulin lispro  12 Units SubCUTAneous TID WC    [START ON 8/23/2022] insulin lispro  0-4 Units SubCUTAneous TID WC    insulin lispro  0-4 Units SubCUTAneous Nightly        Infusions:    sodium chloride      dextrose         PRN Meds:   traZODone, 50 mg, Nightly PRN  sodium chloride flush, 5-40 mL, PRN  sodium chloride, , PRN  ondansetron, 4 mg, Q8H PRN   Or  ondansetron, 4 mg, Q6H PRN  polyethylene glycol, 17 g, Daily PRN  aluminum & magnesium hydroxide-simethicone, 30 mL, Q6H PRN  acetaminophen, 650 mg, Q6H PRN   Or  acetaminophen, 650 mg, Q6H PRN  glucose, 4 tablet, PRN  dextrose bolus, 125 mL, PRN   Or  dextrose bolus, 250 mL, PRN  glucagon (rDNA), 1 mg, PRN  dextrose, , Continuous PRN        Bee Bello MD  08/22/22 9:20 PM

## 2022-08-23 NOTE — CARE COORDINATION
LSW attempted to talk with pt regarding discharge plans. Pt was not in room at time of visit. LSW will try again later. Jessica Mercedes

## 2022-08-23 NOTE — CONSULTS
CARDIOLOGY CONSULT NOTE    Reason for consultation: Chest pain     Referring physician: Eladio Irwin MD      Primary care physician: Cabrrea Contreras MD        Dear Eladio Irwin MD   Thanks for the consult. History of present illness:Yovanny is a 52 y. o.year old who  presents with  chest pain one day of admission for few hrs,intermittent for 15 to 20 mins and aggravated with activity substernal also, not reproducible with palpation, radiated to shoulder, 6/10, not tender to touch,associated with shortness of breath, + sweating, nausea, did not get NTG in ED. No fever, no chills, no nausea no vomiting. Blood pressure, cholesterol, blood glucose and weight are well controlled. Patient was initially seen in the [facility] ED and transferred here, patient has a end-stage renal disease on hemodialysis and has somehow is developing severe anemia his hemoglobin count dropped from 9.4 and now it is to 6.6      Past medical history:    has a past medical history of Abscess, Acute renal failure (ARF) ([address] Anxiety associated with depression, Anxiety associated with depression, Back pain, Chest pain, Diabetic nephropathy ([address] Diabetic neuropathy ([address] Diabetic ulcer of left midfoot associated with type 2 diabetes mellitus, with fat layer exposed ([address] Diverticulosis, DM (diabetes mellitus), type 2 ([address] 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, H/O percutaneous left heart catheterization, Hyperlipidemia LDL goal < 100, Hypertension, Internal hemorrhoid, Necrotizing fasciitis ([address] Nose fracture, Panic attacks, Pericarditis, Peripheral autonomic neuropathy due to diabetes mellitus ([address] Sebaceous cyst, URI (upper respiratory infection), WD-Wound, surgical, infected, initial encounter, and Wrist fracture. Past surgical history:   has a past surgical history that includes Cardiac catheterization (2003, 2013); Tonsillectomy and adenoidectomy (1988); Upper gastrointestinal endoscopy (06/2/2011); Colonoscopy (6/2/2011);  Nose surgery (1993); skin biopsy (N/A, [id number]); other surgical history (Right, 05/22/2015); Toe amputation; Abdomen surgery; other surgical history (12/04/2017); pr deep incis foot bone infectn (Left, 9/22/2018); Upper gastrointestinal endoscopy (N/A, 1/12/2019); Toe amputation (Right, 3/23/2019); Toe amputation (Right, 8/6/2019); Scrotal surgery (N/A, 5/15/2021); Scrotal surgery (N/A, 5/16/2021); Foot surgery (Left, 12/21/2021); Upper gastrointestinal endoscopy (N/A, 7/26/2022); IR TUNNELED CVC PLACE WO SQ PORT/PUMP > 5 YEARS (7/27/2022); and IR NONTUNNELED VASCULAR CATHETER > 5 YEARS (7/25/2022). Social History:   reports that he has been smoking cigarettes. He has never used smokeless tobacco. He reports that he does not currently use alcohol. He reports current drug use. Frequency: 7.00 times per week. Drug: Marijuana Emilysharmaine Cabrera).   Family history:   no family history of CAD, STROKE of DM    0.9 % sodium chloride infusion, PRN  [START ON 8/26/2022] epoetin paola-epbx (RETACRIT) injection 10,000 Units, Once per day on Mon Wed Fri  epoetin paola-epbx (RETACRIT) injection 10,000 Units, Once in dialysis  ARIPiprazole (ABILIFY) tablet 5 mg, Nightly  atorvastatin (LIPITOR) tablet 40 mg, Nightly  calcitRIOL (ROCALTROL) capsule 0.25 mcg, Daily  pantoprazole (PROTONIX) tablet 40 mg, BID AC  sertraline (ZOLOFT) tablet 100 mg, Daily  sucralfate (CARAFATE) tablet 1 g, BID AC  traZODone (DESYREL) tablet 50 mg, Nightly PRN  sodium chloride flush 0.9 % injection 5-40 mL, 2 times per day  sodium chloride flush 0.9 % injection 5-40 mL, PRN  0.9 % sodium chloride infusion, PRN  ondansetron (ZOFRAN-ODT) disintegrating tablet 4 mg, Q8H PRN   Or  ondansetron (ZOFRAN) injection 4 mg, Q6H PRN  polyethylene glycol (GLYCOLAX) packet 17 g, Daily PRN  aluminum & magnesium hydroxide-simethicone (MAALOX) 200-200-20 MG/5ML suspension 30 mL, Q6H PRN  acetaminophen (TYLENOL) tablet 650 mg, Q6H PRN   Or  acetaminophen (TYLENOL) suppository 650 mg, Q6H PRN  glucose chewable tablet 16 g, PRN  dextrose bolus 10% 125 mL, PRN   Or  dextrose bolus 10% 250 mL, PRN  glucagon (rDNA) injection 1 mg, PRN  dextrose 10 % infusion, Continuous PRN  insulin glargine (LANTUS) injection vial 35 Units, Nightly  insulin lispro (HUMALOG) injection vial 12 Units, TID WC  insulin lispro (HUMALOG) injection vial 0-4 Units, TID WC  insulin lispro (HUMALOG) injection vial 0-4 Units, Nightly  nitroGLYCERIN (NITROSTAT) SL tablet 0.4 mg, Q5 Min PRN  oxyCODONE-acetaminophen (PERCOCET) 5-325 MG per tablet 1 tablet, Q6H PRN      Current Facility-Administered Medications   Medication Dose Route Frequency Provider Last Rate Last Admin    0.9 % sodium chloride infusion   IntraVENous PRN MD Sigifredo Ralph ON 8/26/2022] epoetin paola-epbx (RETACRIT) injection 10,000 Units  10,000 Units IntraVENous Once per day on Mon Wed Fri [facility],         epoetin paola-epbx (RETACRIT) injection 10,000 Units  10,000 Units IntraVENous [facility]        ARIPiprazole (ABILIFY) tablet 5 mg  5 mg Oral Nightly Hernan Galarza MD   5 mg at 08/22/22 2159    atorvastatin (LIPITOR) tablet 40 mg  40 mg Oral Nightly Hernan Galarza MD   40 mg at 08/22/22 2159    calcitRIOL (ROCALTROL) capsule 0.25 mcg  0.25 mcg Oral Daily Hernan Galarza MD   0.25 mcg at 08/23/22 0845    pantoprazole (PROTONIX) tablet 40 mg  40 mg Oral BID AC Hernan Galarza MD   40 mg at 08/23/22 0618    sertraline (ZOLOFT) tablet 100 mg  100 mg Oral Daily Hernan Galarza MD   100 mg at 08/23/22 0845    sucralfate (CARAFATE) tablet 1 g  1 g Oral BID AC Hernan Galarza MD   1 g at 08/23/22 0618    traZODone (DESYREL) tablet 50 mg  50 mg Oral Nightly PRN Hernan Galarza MD   50 mg at 08/22/22 2202    sodium chloride flush 0.9 % injection 5-40 mL  5-40 mL IntraVENous 2 times per day Hernan Galarza MD   10 mL at 08/23/22 0841    sodium chloride flush 0.9 % injection 5-40 mL  5-40 mL IntraVENous ZOYA Thibodeaux MD        0.9 % sodium chloride infusion   IntraVENous ZOYA Thibodeaux MD        ondansetron (ZOFRAN-ODT) disintegrating tablet 4 mg  4 mg Oral Q8H ZOYA Thibodeaux MD        Or    ondansetron (ZOFRAN) injection 4 mg  4 mg IntraVENous Q6H ZOYA Thibodeaux MD   4 mg at 08/23/22 0856    polyethylene glycol (GLYCOLAX) packet 17 g  17 g Oral Daily ZOYA Thibodeaux MD        aluminum & magnesium hydroxide-simethicone (MAALOX) 200-200-20 MG/5ML suspension 30 mL  30 mL Oral Q6H ZOYA Thibodeaux MD        acetaminophen (TYLENOL) tablet 650 mg  650 mg Oral Q6H ZOYA Thibodeaux MD        Or    acetaminophen (TYLENOL) suppository 650 mg  650 mg Rectal Q6H ZOYA Thibodeaux MD        glucose chewable tablet 16 g  4 tablet Oral ZOYA Thibodeaux MD        dextrose bolus 10% 125 mL  125 mL IntraVENous ZOYA Thibodeaux MD        Or    dextrose bolus 10% 250 mL  250 mL IntraVENous ZOYA Thibodeaux MD        glucagon (rDNA) injection 1 mg  1 mg SubCUTAneous ZOYA Thibodeaux MD        dextrose 10 % infusion   IntraVENous Continuous ZOYA Thibodeaux MD        insulin glargine (LANTUS) injection vial 35 Units  0.25 Units/kg SubCUTAneous Nightly Preethi Ya MD   35 Units at 08/22/22 2202    insulin lispro (HUMALOG) injection vial 12 Units  12 Units SubCUTAneous TID  SANJANA Corona MD        insulin lispro (HUMALOG) injection vial 0-4 Units  0-4 Units SubCUTAneous TID  Samson Thibodeaux MD        insulin lispro (HUMALOG) injection vial 0-4 Units  0-4 Units SubCUTAneous Nightly Samson Thibodeaux MD        nitroGLYCERIN (NITROSTAT) SL tablet 0.4 mg  0.4 mg SubLINGual Q5 Min ZOYA Thibodeaux MD        oxyCODONE-acetaminophen (PERCOCET) 5-325 MG per tablet 1 tablet  1 tablet Oral Q6H ZOYA Thibodeaux MD   1 tablet at 08/23/22 1270          Review of Systems:    Constitutional: No Fever or Weight Loss    Eyes: No Decreased Vision  ENT: No Headaches, Hearing Loss or Vertigo  Cardiovascular: + chest pain, dyspnea on exertion, palpitations or loss of consciousness  Respiratory: No cough or wheezing    Gastrointestinal: No abdominal pain, appetite loss, blood in stools, constipation, diarrhea or heartburn  Genitourinary: No dysuria, trouble voiding, or hematuria  Musculoskeletal: No gait disturbance, weakness or joint complaints  Integumentary: No rash or pruritis  Neurological: No TIA or stroke symptoms  Psychiatric: No anxiety or depression  Endocrine: No malaise, fatigue or temperature intolerance  Hematologic/Lymphatic: No bleeding problems, blood clots or swollen lymph nodes  Allergic/Immunologic: No nasal congestion or hives  All systems negative except as marked. Physical Examination:    Vitals:    08/23/22 0942   BP:    Pulse: 84   Resp: 16   Temp: 97.7 °F (36.5 °C)   SpO2:       Wt Readings from Last 3 Encounters:   08/23/22 (!) 310 lb 11.2 oz (140.9 kg)   08/03/22 (!) 304 lb 3.8 oz (138 kg)   12/22/21 297 lb 6.4 oz (134.9 kg)     Body mass index is 45.88 kg/m². General Appearance: No distress, conversant     Constitutional: Well developed, Well nourished, No acute distress, Non-toxic appearance. HENT:  Normocephalic, Atraumatic, Bilateral external ears normal, Oropharynx moist, No oral exudates, Nose normal. Neck- Normal range of motion, No tenderness, Supple, No stridor,no apical-carotid delay, no carotid bruit  Eyes: PERRL, EOMI, Conjunctiva normal, No discharge. Respiratory:  Normal breath sounds, No respiratory distress, No wheezing, No chest tenderness. ,no use of accessory muscles, diaphragm movement is normal  Cardiovascular: (PMI) apex non displaced,no lifts no thrills, no s3,no s4, Normal heart rate, Normal rhythm, No murmurs, No rubs, No gallops.  Carotid arteries pulse and amplitude are normal no bruit, no abdominal bruit noted ( normal abdominal aorta ausculation), femoral arteries pulse statins  CAD as mentioned above has LAD PCI in September last year, circumflex and RCA were not significantly stenotic, LVEF was normal on echo  Health maintenance: exerise and diet  All labs, medications and tests reviewed, continue all other medications of all above medical condition listed as is.           Presley Montana MD,   Presley Montana MD, 8/23/2022 10:00 AM

## 2022-08-23 NOTE — CONSULTS
Nephrology Service Consultation      [address], [address] [address]  Phone: [phone]  Office Hours: 8:30AM - 4:30PM  Monday - Friday              MDM//Assessment and Recommendations     -Chest pain  -Acute anemia  --Orthostasis  -ESRD on HD MWF    Plan;  Transfuse 2 units blood either in HD or on the floor  HD today  Cardio eval  Will follow    Thank you          Patient Active Problem List    Diagnosis Date Noted    Chest tightness 08/22/2022    CARMEN (acute kidney injury) ([address] 07/25/2022    Anemia 07/25/2022    Recurrent major depressive disorder, in full remission ([address] 05/18/2021    Generalized anxiety disorder 05/18/2021    Surgical wound dehiscence 02/08/2022    Chest pain 12/21/2021    Other proteinuria     FH: CAD (coronary artery disease) 09/29/2021    ASCVD (arteriosclerotic cardiovascular disease) 09/29/2021    Chronic kidney disease, stage IV (severe) ([address] 09/28/2021    Nephrotic syndrome 09/22/2021    Generalized edema 09/22/2021    Stage 3b chronic kidney disease ([address] 09/22/2021    Diabetic nephropathy associated with type 2 diabetes mellitus ([address] 09/22/2021    Hypoalbuminemia 09/22/2021    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 type 2 diabetes mellitus, with muscle involvement without evidence of necrosis ([address] 09/21/2021    Right groin wound 06/09/2021    Ulcer of right groin with fat layer exposed ([address]     Syncope 06/05/2021    Necrotizing fasciitis ([address] 05/24/2021    Morbid obesity with body mass index (BMI) of 40.0 to 44.9 in adult ([address] 05/21/2021    Irene gangrene     Cellulitis, scrotum 05/13/2021    Acute epididymitis     Cellulitis of left arm 04/03/2021    Cellulitis 03/23/2021    Acute renal failure (ARF) ([address] 08/04/2019    Diabetic foot infection ([address] 03/21/2019    Intractable nausea and vomiting 01/11/2019    Uncontrolled type 2 diabetes mellitus ([facility] 01/11/2019    Nausea and vomiting 01/11/2019    Constipation 10/07/2018 Cellulitis of left foot     Sepsis ([address] 35/49/8919    Periumbilical hernia     Abscess 12/03/2017    Osteomyelitis ([address] 05/22/2015    Bronchitis 10/15/2013    Hyperlipidemia with target LDL less than 100 07/15/2013    Essential hypertension 07/15/2013    Bilateral carpal tunnel syndrome- left worse than right 06/24/2013    DANIELA (obstructive sleep apnea) 06/20/2013    Urinary frequency 06/20/2013    Neck pain 05/16/2013    Anxiety associated with depression     Panic attack 02/01/2012    Diabetic neuropathy ([address] 12/22/2011    Hiatal hernia 02/04/2011    Diabetes mellitus type 2, improved controlled 02/04/2011         Patient:  Andra Webster  MRN: [id number]  Consulting physician:  Gloria Pacheco MD  Reason for Consult: chest pain, anemia    PCP: Lemuel Bedoya MD    HISTORY OF PRESENT ILLNESS:   The patient is a 52 y.o. male with ESRD, CAD presented due to chest pain in HD yesterday  EMS was asked to bring him here, yet he ended up at [facility] er, thus delaying thinds  Renal consult for chest pain, anemia. He barely had HD yesterday  He is doing ok this morning    REVIEW OF SYSTEMS:  14 point ROS is Negative. See positive ROS per HPI    Past Medical History:        Diagnosis Date    Abscess 2010    scrotal    Acute renal failure (ARF) ([address] 08/04/2019    Anxiety associated with depression     Anxiety associated with depression     Back pain 07/02/2012    Chest pain 05/01/2013    Diabetic nephropathy ([address]     Diabetic neuropathy ([address] 12/22/2011    Diabetic ulcer of left midfoot associated with type 2 diabetes mellitus, with fat layer exposed ([address] 07/18/2017    Diverticulosis     C scope + Dr. Aldon Schilder    DM (diabetes mellitus), type 2 ([address] 2002    DR. 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     H/O percutaneous left heart catheterization 09/30/2021    PCI procedure:DE Stent, LAD: DE Stent Plcmt Initl Vsl    Hyperlipidemia LDL goal < 100 07/15/2013    Hypertension     Internal hemorrhoid     C scope +  Adam    Necrotizing fasciitis ([facility]     Nose fracture 1988    Panic attacks     Pericarditis 2003    Hospitalized with s/p heart cath normal    Peripheral autonomic neuropathy due to diabetes mellitus ([facility]     Axonal EMG- NCS, March 2011    Sebaceous cyst 09/01/2011    URI (upper respiratory infection) 02/27/2012    WD-Wound, surgical, infected, initial encounter 12/08/2017    Wrist fracture 1986       Past Surgical History:        Procedure Laterality Date    [address] [facility] [address]  2003, 2013    Normal (Dr. Lorraine Quintanilla)    COLONOSCOPY  6/2/2011    Pandiverticulosis, Nonbleeding internal hemorrhoids, Repeat colonoscopy at age 48- Dr. Deborah Farr Left 12/21/2021    Lennette Red performed by Dhara Salcedo DPM at [address] [facility] NONTUNNELED VASCULAR CATHETER  7/25/2022    IR NONTUNNELED VASCULAR CATHETER 7/25/2022 [facility] SPECIAL PROCEDURES    IR TUNNELED CATHETER PLACEMENT GREATER THAN 5 YEARS  7/27/2022    IR TUNNELED CATHETER PLACEMENT GREATER THAN 5 YEARS 7/27/2022 [facility] SPECIAL PROCEDURES    NOSE SURGERY  1993    \"had reconstruction surgery on nose a year after the other nose surgery\"    OTHER SURGICAL HISTORY Right 05/22/2015    I & D right great toe with partial amputation    OTHER SURGICAL HISTORY  12/04/2017    inc and drainage of abcess    NC DEEP INCIS FOOT BONE INFECTN Left 9/22/2018    LEFT FOOT DEBRIDEMENT INCISION AND DRAINAGE TOP AND BOTTOM performed by Hugo Menjivar DPM at [address] [place] [address] 5/15/2021    SCROTAL AND PERINEAL DEBRIDEMENT performed by Dulce Purvis MD at [address] [place] [address] 5/16/2021    SCROTAL RE-DEBRIDEMENT  INCISION AND DRAINAGE performed by Dulce Purvis MD at [address] [place] [address] [phone]    sebaceous cyst removal times 4     TOE AMPUTATION      right great toe    TOE AMPUTATION Right 3/23/2019    TOE AMPUTATION RIGHT 5TH TOE performed by Hugo Menjivar DPM at [address] Right 8/6/2019 TOE AMPUTATION RIGHT 4TH TOE performed by Nat Durbin DPM at [address] [facility] ENDOSCOPY  06/2/2011    Mild gastritis with moderatley severe antritis, small hiatal hernia, Dr. Bonds Sor 1/12/2019    EGD BIOPSY performed by Adrián Messina MD at [address] [place] [address] N/A 7/26/2022    EGD DIAGNOSTIC ONLY performed by Adrián Messina MD at [address] [facility] ENDOSCOPY       Medications:   Prior to Admission medications    Medication Sig Start Date End Date Taking? Authorizing Provider   traZODone (DESYREL) 50 MG tablet Take 1 tablet by mouth nightly as needed for Sleep 8/3/22   Felicia Perez MD   alogliptin (NESINA) 6.25 MG TABS tablet Take 1 tablet by mouth in the morning. 8/4/22   Felicia Perez MD   insulin lispro, 1 Unit Dial, (HUMALOG KWIKPEN) 100 UNIT/ML SOPN Inject 20 Units into the skin in the morning and 20 Units at noon and 20 Units in the evening. Inject before meals. 8/3/22   Felicia Perez MD   insulin glargine (LANTUS SOLOSTAR) 100 UNIT/ML injection pen Inject 45 Units into the skin nightly 8/3/22   Felicia Perez MD   ARIPiprazole (ABILIFY) 5 MG tablet Take 1 tablet by mouth at bedtime 8/3/22   Felicia Perez MD   pantoprazole (PROTONIX) 40 MG tablet Take 1 tablet by mouth in the morning and 1 tablet in the evening. Take before meals. 8/3/22   Felicia Perez MD   sucralfate (CARAFATE) 1 GM tablet Take 1 tablet by mouth in the morning and 1 tablet in the evening. Take before meals. 8/3/22   Felicia Perez MD   calcitRIOL (ROCALTROL) 0.25 MCG capsule Take 1 capsule by mouth in the morning.  8/4/22   Felicia Perez MD   apixaban (ELIQUIS) 2.5 MG TABS tablet Take 1 tablet by mouth 2 times daily 10/1/21   APPLE Connors CNP   clopidogrel (PLAVIX) 75 MG tablet Take 1 tablet by mouth daily 10/2/21   APPLE Connors CNP   furosemide (LASIX) 40 MG tablet Take 1 tablet by mouth daily 9/22/21 8/3/22  Nury Richards MD   insulin aspart (NOVOLOG) 100 UNIT/ML injection vial Inject 35 Units into the skin 3 times daily (before meals)  8/3/22  Historical Provider, MD   atorvastatin (LIPITOR) 40 MG tablet Take 1 tablet by mouth nightly 6/15/21   Shani Lucia MD   metoprolol tartrate (LOPRESSOR) 25 MG tablet Take 1 tablet by mouth 2 times daily 6/15/21 8/3/22  Shani Lucia MD   amLODIPine (NORVASC) 10 MG tablet Take 1 tablet by mouth daily 6/15/21 8/3/22  Shani Lucia MD   chlorthalidone (HYGROTON) 25 MG tablet Take 1 tablet by mouth daily 6/15/21 8/3/22  Shani Lucia MD   sertraline (ZOLOFT) 50 MG tablet Take 2 tablets by mouth daily 5/29/21   Shani Lucia MD   aspirin 81 MG EC tablet Take 1 tablet by mouth daily 4/5/21   Fredy Arriaga MD   linagliptin (TRADJENTA) 5 MG tablet Take 1 tablet by mouth daily 3/29/21 8/3/22  Shani Lucia MD   ondansetron (ZOFRAN) 4 MG tablet Take 1 tablet by mouth every 8 hours as needed for Nausea or Vomiting 9/23/18   Grady Pennington MD   Lancets MISC 1 each by Does not apply route daily 9/21/18   Grady Pennington MD   glucose monitoring kit (FREESTYLE) monitoring kit 1 each by Does not apply route once for 1 dose. 6/20/13 6/20/13  Macel Lefort, MD        Allergies:  Adhesive tape, Doxycycline, and Reglan [metoclopramide]    Social History:   TOBACCO:   reports that he has been smoking cigarettes. He has never used smokeless tobacco.  ETOH:   reports that he does not currently use alcohol.   OCCUPATION:      Family History:       Problem Relation Age of Onset    Cancer Mother         ?site    Stroke Mother     Bleeding Prob Mother     Diabetes Father     Heart Disease Father     High Blood Pressure Father     Obesity Father     Kidney Disease Father     Diabetes Sister     High Blood Pressure Sister     Mental Illness Sister     Obesity Sister     High Blood Pressure Other     Mental Illness Other bipolar    Other Son         cyst in ear canal    ADHD Daughter      Physical Exam:    Vitals: /88   Pulse 88   Temp 98.1 °F (36.7 °C) (Oral)   Resp 17   Ht 5' 9\" (1.753 m)   Wt (!) 306 lb 12.8 oz (139.2 kg)   SpO2 91%   BMI 45.31 kg/m²   General appearance: in no acute distress, appears stated age  Skin: Skin color, texture, turgor normal. No rashes or lesions  HEENT: normocephalic, atraumatic  Neck: supple, trachea midline  Lungs: clear to auscultation bilaterally, breathing comfortably  Heart[de-identified] regular rate and rhythm, S1, S2 normal,  Abdomen: soft, non-tender; bowel sounds normal; no masses,   Extremities: extremities normal, atraumatic, no cyanosis or edema  Neurologic: Mental status: alert, oriented, interactive, following commands  Psychiatric: mood and affect appropriate     CBC:   Recent Labs     08/23/22  0712   WBC 6.7   HGB 6.6*        BMP:    Recent Labs     08/22/22  0800 08/23/22  0712    137   K 4.3 4.3    102   CO2 23 25   BUN 68* 54*   CREATININE 7.1* 6.1*   GLUCOSE 127* 146*     Hepatic: No results for input(s): AST, ALT, ALB, BILITOT, ALKPHOS in the last 72 hours. Troponin: No results for input(s): TROPONINI in the last 72 hours. BNP: No results for input(s): BNP in the last 72 hours. No intake/output data recorded.          Electronically signed by Meliza Cortes DO on 8/23/2022 at 9:35 AM    MD Tiffanie Pérez DO [address],  [place]  [facility], [place] [address]  PHONE: [phone]  FAX: [phone]

## 2022-08-23 NOTE — CONSULTS
2011    Sebaceous cyst 09/01/2011    URI (upper respiratory infection) 02/27/2012    WD-Wound, surgical, infected, initial encounter 12/08/2017    Wrist fracture 1986     Surgical History:        Procedure Laterality Date    [address] [facility] [address]  2003, 2013    Normal (Dr. Letha Hinson)    COLONOSCOPY  6/2/2011    Pandiverticulosis, Nonbleeding internal hemorrhoids, Repeat colonoscopy at age 48- Dr. Pantoja Haines Left 12/21/2021    Aloma November performed by Jose L Pena DPM at [address] [facility] NONTUNNELED VASCULAR CATHETER  7/25/2022    IR NONTUNNELED VASCULAR CATHETER 7/25/2022 [facility] SPECIAL PROCEDURES    IR TUNNELED CATHETER PLACEMENT GREATER THAN 5 YEARS  7/27/2022    IR TUNNELED CATHETER PLACEMENT GREATER THAN 5 YEARS 7/27/2022 [address]    NOSE SURGERY  1993    \"had reconstruction surgery on nose a year after the other nose surgery\"    OTHER SURGICAL HISTORY Right 05/22/2015    I & D right great toe with partial amputation    OTHER SURGICAL HISTORY  12/04/2017    inc and drainage of abcess    FL DEEP INCIS FOOT BONE INFECTN Left 9/22/2018    LEFT FOOT DEBRIDEMENT INCISION AND DRAINAGE TOP AND BOTTOM performed by Terell Barajas DPM at [place] [address] 5/15/2021    SCROTAL AND PERINEAL DEBRIDEMENT performed by Yasmani Rolon MD at [place] [address] 5/16/2021    SCROTAL RE-DEBRIDEMENT  INCISION AND DRAINAGE performed by Yasmani Rolno MD at [address] [place] [address] [phone]    sebaceous cyst removal times 4     TOE AMPUTATION      right great toe    TOE AMPUTATION Right 3/23/2019    TOE AMPUTATION RIGHT 5TH TOE performed by Terell Barajas DPM at [address] [facility] Right 8/6/2019    TOE AMPUTATION RIGHT 4TH TOE performed by Terell Barajas DPM at [address] [address]  06/2/2011    Mild gastritis with moderatley severe antritis, small hiatal hernia,  Adam    UPPER GASTROINTESTINAL ENDOSCOPY N/A 1/12/2019    EGD BIOPSY performed by Jalil Bragg MD at [address] [place] [address] N/A 7/26/2022    EGD DIAGNOSTIC ONLY performed by Jalil Bragg MD at [address] [facility] ENDOSCOPY       Allergies:  Adhesive tape, Doxycycline, and Reglan [metoclopramide]    Family History:       Problem Relation Age of Onset    Cancer Mother         ?site    Stroke Mother     Bleeding Prob Mother     Diabetes Father     Heart Disease Father     High Blood Pressure Father     Obesity Father     Kidney Disease Father     Diabetes Sister     High Blood Pressure Sister     Mental Illness Sister     Obesity Sister     High Blood Pressure Other     Mental Illness Other         bipolar    Other Son         cyst in ear canal    ADHD Daughter      REVIEW OF SYSTEMS:  Review of System Done as noted above     PHYSICAL EXAM:      Vitals:    /68   Pulse 88   Temp 98 °F (36.7 °C) (Oral)   Resp 17   Ht 5' 9\" (1.753 m)   Wt (!) 306 lb 12.8 oz (139.2 kg)   SpO2 100%   BMI 45.31 kg/m²     CONSTITUTIONAL:  awake, alert, cooperative, appears stated age   EYES:  vision intact Fundoscopic Exam not performed   ENT:Normal  NECK:  Supple, No JVD. Thyroid Exam:Normal   LUNGS:  Has Vesicular Breath Sounds,   CARDIOVASCULAR:  Normal apical impulse, regular rate and rhythm, normal S1 and S2, no S3 or S4, and has no  murmur   ABDOMEN:  No scars, normal bowel sounds, soft, non-distended, non-tender, no masses palpated, no hepatolienomegaly  Musculoskeletal: Normal  Extremities: Normal, peripheral pulses normal, , has no edema rotation of left toes  NEUROLOGIC:  Awake, alert, oriented to name, place and time. Cranial nerves II-XII are grossly intact. Motor is  intact. Sensory neuropathy. ,  and gait is normal.    DATA:    CBC: No results for input(s): WBC, HGB, PLT in the last 72 hours.  CMP:  Recent Labs     08/22/22  0800      K 4.3      CO2 23   BUN 68*   CREATININE 7.1* CALCIUM 7.6*     Lipids:   Lab Results   Component Value Date/Time    CHOL 145 05/13/2021 10:28 AM    CHOL 168 10/15/2013 10:30 AM    HDL 29 05/13/2021 10:28 AM    TRIG 188 05/13/2021 10:28 AM     Glucose:   Recent Labs     08/22/22 2132   POCGLU 139*     Hemoglobin A1C:   Lab Results   Component Value Date/Time    LABA1C 10.0 07/25/2022 06:50 AM     Free T4:   Lab Results   Component Value Date/Time    T4FREE 1.25 07/24/2017 11:10 AM     Free T3: No results found for: FT3  TSH High Sensitivity:   Lab Results   Component Value Date/Time    TSHHS 3.860 07/25/2022 06:50 AM       No orders to display          Scheduled Medicines   Medications:    ARIPiprazole  5 mg Oral Nightly    atorvastatin  40 mg Oral Nightly    calcitRIOL  0.25 mcg Oral Daily    pantoprazole  40 mg Oral BID AC    sertraline  100 mg Oral Daily    sucralfate  1 g Oral BID AC    sodium chloride flush  5-40 mL IntraVENous 2 times per day    insulin glargine  0.25 Units/kg SubCUTAneous Nightly    insulin lispro  12 Units SubCUTAneous TID WC    insulin lispro  0-4 Units SubCUTAneous TID WC    insulin lispro  0-4 Units SubCUTAneous Nightly      Infusions:    sodium chloride      dextrose           IMPRESSION    Patient Active Problem List   Diagnosis    Hiatal hernia    Diabetes mellitus type 2, improved controlled    Diabetic neuropathy (HCC)    Panic attack    Anxiety associated with depression    Neck pain    DANIELA (obstructive sleep apnea)    Urinary frequency    Bilateral carpal tunnel syndrome- left worse than right    Hyperlipidemia with target LDL less than 100    Essential hypertension    Bronchitis    Osteomyelitis (HCC)    Abscess    Periumbilical hernia    Sepsis ([address]    Cellulitis of left foot    Constipation    Intractable nausea and vomiting    Uncontrolled type 2 diabetes mellitus (HCC)    Nausea and vomiting    Diabetic foot infection ([address]    Acute renal failure (ARF) (HCC)    Cellulitis    Cellulitis of left arm    Cellulitis, scrotum    Acute epididymitis    Irene gangrene    Recurrent major depressive disorder, in full remission ([address]    Generalized anxiety disorder    Morbid obesity with body mass index (BMI) of 40.0 to 44.9 in adult [facility])    Necrotizing fasciitis ([address]    Syncope    Right groin wound    Ulcer of right groin with fat layer exposed ([address]    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 type 2 diabetes mellitus, with muscle involvement without evidence of necrosis (HCC)    Nephrotic syndrome    Generalized edema    Stage 3b chronic kidney disease ([address]    Diabetic nephropathy associated with type 2 diabetes mellitus (HCC)    Hypoalbuminemia    Chronic kidney disease, stage IV (severe) ([facility])    FH: CAD (coronary artery disease)    ASCVD (arteriosclerotic cardiovascular disease)    Other proteinuria    Chest pain    Surgical wound dehiscence    CARMEN (acute kidney injury) ([address]    Anemia    Chest tightness         RECOMMENDATIONS:      Reviewed POC blood glucose . Labs and X ray results   Reviewed Home and Current Medicines   Will Start On meal/ Correction bolus Humalog/ Lantus Insulin regime Monitor  Blood glucose frequently   Modify  the dose of Insulin as needed        Will follow with you  Again thank you for sharing pt's care with me.      Truly yours,       Kevin Milligan MD

## 2022-08-23 NOTE — PROGRESS NOTES
Patient currently off floor at dialysis, troponin alert received from lab, notified Cardio  and attending MD

## 2022-08-23 NOTE — PROGRESS NOTES
4 Eyes Skin Assessment     NAME:  Joce Elizabeth OF BIRTH:  1975  MEDICAL RECORD NUMBER:  [id number]    The patient is being assess for  {Reason for Assessment:09015}    I agree that 2 RN's have performed a thorough Head to Toe Skin Assessment on the patient. ALL assessment sites listed below have been assessed. Areas assessed by both nurses:    {Pressure Areas Assessed:81577}        Does the Patient have a Wound?  No noted wound(s)       Pradeep Prevention initiated:  No   Wound Care Orders initiated:  No    Pressure Injury (Stage 3,4, Unstageable, DTI, NWPT, and Complex wounds) if present place referral/consult order under [de-identified] No    New and Established Ostomies if present place consult order under : No      Nurse 1 eSignature: Electronically signed by Darius Franco RN on 8/23/22 at 6:10 AM EDT    **SHARE this note so that the co-signing nurse is able to place an eSignature**    Nurse 2 eSignature: {Esignature:[id number]}

## 2022-08-23 NOTE — CONSULTS
Weekly routine line dressing change completed. Patient's right HD Catheter dressing changed using sterile technique per protocol. Patient tolerated well. Please consult IV/PICC Team for any questions or if patient's needs change.

## 2022-08-23 NOTE — CARE COORDINATION
CM in to see Pt to initiate discharge planning. Pt from home with his family. Pt has DME to include cane and walker. Pt denies the need for home care at this time. Pt has HD MWF [facility] [address] [facility]. Pt has insurance, pcp, and can afford medications. Pt denies any needs at this time.      CM following

## 2022-08-23 NOTE — PROGRESS NOTES
Received call from lab with 6.6 hemoglobin result. Notified MD and new orders received. Patient aware.

## 2022-08-23 NOTE — PROGRESS NOTES
V2.0  [facility] Hospitalist Progress Note      Name:  Dante Wakefield /Age/Sex: 1975  (52 y.o. male)   MRN & CSN:  [id number] & [id number] Encounter Date/Time: 2022 1:10 PM EDT    Location:  6689/1472-V PCP: Elfego Virk MD       Hospital Day: 2    Assessment and Plan:   Dante Wakefield is a 52 y.o. male who presents with chest tightness      Plan:  Chest tightness, NSTEMI-occurred during dialysis. Cardiology following. On statin. Anemia-no gross bleeding. History of Sandra-Roberson tear on EGD . Antiplatelets and anticoagulation held. On Protonix and Carafate. Hemoglobin 6.6 today. Transfused 1 unit PRBC. Patient gave consent. Recheck hemoglobin. As per GI no EGD at this time, colonoscopy as outpatient. ESRD on HD-HD on MWF. Nephrology following. DM2-hold home alogliptin. On insulin as per endocrinology  Morbid obesity BMI 46. Diet Diet NPO Exceptions are: Sips of Water with Meds, Ice Chips    DVT Prophylaxis [] Lovenox, []  Heparin, [] SCDs, [] Ambulation,  [] Eliquis, [] Xarelto  [] Coumadin   Code Status Full Code   Disposition From: Home  Expected Disposition: Hem  Estimated Date of Discharge:   Patient requires continued admission due to anemia and chest pain   Home O2 None     Subjective/Interval history:   Chief Complaint: No chief complaint on file. States he had chest tightness yesterday which felt similar to when he had to get a cardiac stent placed. He also has some discomfort in his upper abdomen. No nausea or vomiting at this time. Had dialysis today. No gross bleeding. Review of Systems:    Negative unless mentioned above    Objective:      Intake/Output Summary (Last 24 hours) at 2022 1310  Last data filed at 2022 1245  Gross per 24 hour   Intake 500 ml   Output 2000 ml   Net -1500 ml        Vitals:   /76   Pulse 82   Temp 98.2 °F (36.8 °C)   Resp 20   Ht 5' 9\" (1.753 m)   Wt (!) 306 lb 6.4 oz (139 kg)   SpO2 91%   BMI 45.25 kg/m²     Physical Exam:   General: NAD, laying in bed  Eyes: no discharge  HENT: NCAT  Cardiovascular: RRR  Respiratory: Clear to auscultation   Gastrointestinal: Soft, nondistended, mild tenderness in epigastric area  Genitourinary: no suprapubic tenderness  Musculoskeletal: No edema, nontender  Skin: warm, dry, no gross lesions  Neuro: Alert. No gross deficits  Psych: Mood appropriate.      Medications:   Medications:    [START ON 8/26/2022] epoetin paola-epbx  10,000 Units IntraVENous Once per day on Mon Wed Fri    ARIPiprazole  5 mg Oral Nightly    atorvastatin  40 mg Oral Nightly    calcitRIOL  0.25 mcg Oral Daily    pantoprazole  40 mg Oral BID AC    sertraline  100 mg Oral Daily    sucralfate  1 g Oral BID AC    sodium chloride flush  5-40 mL IntraVENous 2 times per day    insulin glargine  0.25 Units/kg SubCUTAneous Nightly    insulin lispro  12 Units SubCUTAneous TID WC    insulin lispro  0-4 Units SubCUTAneous TID WC    insulin lispro  0-4 Units SubCUTAneous Nightly      Infusions:    sodium chloride      sodium chloride      dextrose       PRN Meds: sodium chloride, , PRN  traZODone, 50 mg, Nightly PRN  sodium chloride flush, 5-40 mL, PRN  sodium chloride, , PRN  ondansetron, 4 mg, Q8H PRN   Or  ondansetron, 4 mg, Q6H PRN  polyethylene glycol, 17 g, Daily PRN  aluminum & magnesium hydroxide-simethicone, 30 mL, Q6H PRN  acetaminophen, 650 mg, Q6H PRN   Or  acetaminophen, 650 mg, Q6H PRN  glucose, 4 tablet, PRN  dextrose bolus, 125 mL, PRN   Or  dextrose bolus, 250 mL, PRN  glucagon (rDNA), 1 mg, PRN  dextrose, , Continuous PRN  nitroGLYCERIN, 0.4 mg, Q5 Min PRN  oxyCODONE-acetaminophen, 1 tablet, Q6H PRN        Labs      Recent Labs     08/23/22  0712   WBC 6.7   HGB 6.6*   HCT 21.5*         Recent Labs     08/22/22  0800 08/23/22  0712    137   K 4.3 4.3    102   CO2 23 25   BUN 68* 54*   CREATININE 7.1* 6.1*     No results for input(s): AST, ALT, ALB, BILIDIR, BILITOT, ALKPHOS in the last 72 hours.   Recent Labs     08/23/22  0712   INR 1.08     Recent Labs     08/23/22 0712   TROPONINT 0.115*     Lab Results   Component Value Date/Time    LABA1C 10.0 07/25/2022 06:50 AM     CALCIUM:  7.4/25 (08/23 9543)  Lab Results   Component Value Date/Time    MG 2.4 06/11/2021 06:09 AM           Electronically signed by Meagan Desouza MD on 8/23/2022 at 1:10 PM

## 2022-08-24 PROBLEM — I21.4 NSTEMI (NON-ST ELEVATED MYOCARDIAL INFARCTION) (HCC): Status: ACTIVE | Noted: 2022-08-24

## 2022-08-24 LAB
ABO/RH: NORMAL
ACTIVATED CLOTTING TIME, LOW RANGE: 142 SEC
ACTIVATED CLOTTING TIME, LOW RANGE: >400 SEC
ANTIBODY SCREEN: NEGATIVE
COMPONENT: NORMAL
COMPONENT: NORMAL
CROSSMATCH RESULT: NORMAL
CROSSMATCH RESULT: NORMAL
GLUCOSE BLD-MCNC: 130 MG/DL (ref 70–99)
GLUCOSE BLD-MCNC: 133 MG/DL (ref 70–99)
GLUCOSE BLD-MCNC: 137 MG/DL (ref 70–99)
GLUCOSE BLD-MCNC: 172 MG/DL (ref 70–99)
HCT VFR BLD CALC: 26.6 % (ref 42–52)
HEMOGLOBIN: 8.4 GM/DL (ref 13.5–18)
STATUS: NORMAL
STATUS: NORMAL
TRANSFUSION STATUS: NORMAL
TRANSFUSION STATUS: NORMAL
UNIT DIVISION: 0
UNIT DIVISION: 0
UNIT NUMBER: NORMAL
UNIT NUMBER: NORMAL

## 2022-08-24 PROCEDURE — 85014 HEMATOCRIT: CPT

## 2022-08-24 PROCEDURE — 6370000000 HC RX 637 (ALT 250 FOR IP): Performed by: HOSPITALIST

## 2022-08-24 PROCEDURE — 36415 COLL VENOUS BLD VENIPUNCTURE: CPT

## 2022-08-24 PROCEDURE — C1769 GUIDE WIRE: HCPCS

## 2022-08-24 PROCEDURE — 6370000000 HC RX 637 (ALT 250 FOR IP)

## 2022-08-24 PROCEDURE — 6360000004 HC RX CONTRAST MEDICATION

## 2022-08-24 PROCEDURE — C1887 CATHETER, GUIDING: HCPCS

## 2022-08-24 PROCEDURE — 6370000000 HC RX 637 (ALT 250 FOR IP): Performed by: STUDENT IN AN ORGANIZED HEALTH CARE EDUCATION/TRAINING PROGRAM

## 2022-08-24 PROCEDURE — 2580000003 HC RX 258: Performed by: INTERNAL MEDICINE

## 2022-08-24 PROCEDURE — B2151ZZ FLUOROSCOPY OF LEFT HEART USING LOW OSMOLAR CONTRAST: ICD-10-PCS | Performed by: INTERNAL MEDICINE

## 2022-08-24 PROCEDURE — 6370000000 HC RX 637 (ALT 250 FOR IP): Performed by: INTERNAL MEDICINE

## 2022-08-24 PROCEDURE — C9600 PERC DRUG-EL COR STENT SING: HCPCS

## 2022-08-24 PROCEDURE — 85347 COAGULATION TIME ACTIVATED: CPT

## 2022-08-24 PROCEDURE — 93458 L HRT ARTERY/VENTRICLE ANGIO: CPT

## 2022-08-24 PROCEDURE — 82962 GLUCOSE BLOOD TEST: CPT

## 2022-08-24 PROCEDURE — 92928 PRQ TCAT PLMT NTRAC ST 1 LES: CPT | Performed by: INTERNAL MEDICINE

## 2022-08-24 PROCEDURE — 6360000002 HC RX W HCPCS: Performed by: INTERNAL MEDICINE

## 2022-08-24 PROCEDURE — 6360000002 HC RX W HCPCS

## 2022-08-24 PROCEDURE — 2580000003 HC RX 258: Performed by: STUDENT IN AN ORGANIZED HEALTH CARE EDUCATION/TRAINING PROGRAM

## 2022-08-24 PROCEDURE — B2111ZZ FLUOROSCOPY OF MULTIPLE CORONARY ARTERIES USING LOW OSMOLAR CONTRAST: ICD-10-PCS | Performed by: INTERNAL MEDICINE

## 2022-08-24 PROCEDURE — 4A023N7 MEASUREMENT OF CARDIAC SAMPLING AND PRESSURE, LEFT HEART, PERCUTANEOUS APPROACH: ICD-10-PCS | Performed by: INTERNAL MEDICINE

## 2022-08-24 PROCEDURE — C1874 STENT, COATED/COV W/DEL SYS: HCPCS

## 2022-08-24 PROCEDURE — 027034Z DILATION OF CORONARY ARTERY, ONE ARTERY WITH DRUG-ELUTING INTRALUMINAL DEVICE, PERCUTANEOUS APPROACH: ICD-10-PCS | Performed by: INTERNAL MEDICINE

## 2022-08-24 PROCEDURE — 94761 N-INVAS EAR/PLS OXIMETRY MLT: CPT

## 2022-08-24 PROCEDURE — 2140000000 HC CCU INTERMEDIATE R&B

## 2022-08-24 PROCEDURE — 85018 HEMOGLOBIN: CPT

## 2022-08-24 PROCEDURE — 93458 L HRT ARTERY/VENTRICLE ANGIO: CPT | Performed by: INTERNAL MEDICINE

## 2022-08-24 PROCEDURE — 2500000003 HC RX 250 WO HCPCS

## 2022-08-24 PROCEDURE — 6360000002 HC RX W HCPCS: Performed by: STUDENT IN AN ORGANIZED HEALTH CARE EDUCATION/TRAINING PROGRAM

## 2022-08-24 RX ORDER — CLOPIDOGREL BISULFATE 75 MG/1
75 TABLET ORAL DAILY
Status: DISCONTINUED | OUTPATIENT
Start: 2022-08-25 | End: 2022-08-25 | Stop reason: HOSPADM

## 2022-08-24 RX ORDER — OXYCODONE HYDROCHLORIDE AND ACETAMINOPHEN 5; 325 MG/1; MG/1
1 TABLET ORAL EVERY 8 HOURS PRN
Status: DISCONTINUED | OUTPATIENT
Start: 2022-08-24 | End: 2022-08-25 | Stop reason: HOSPADM

## 2022-08-24 RX ORDER — SODIUM CHLORIDE 0.9 % (FLUSH) 0.9 %
5-40 SYRINGE (ML) INJECTION EVERY 12 HOURS SCHEDULED
Status: DISCONTINUED | OUTPATIENT
Start: 2022-08-24 | End: 2022-08-25 | Stop reason: HOSPADM

## 2022-08-24 RX ORDER — ATROPINE SULFATE 0.1 MG/ML
INJECTION INTRAVENOUS
Status: DISCONTINUED
Start: 2022-08-24 | End: 2022-08-24 | Stop reason: WASHOUT

## 2022-08-24 RX ORDER — ONDANSETRON 2 MG/ML
4 INJECTION INTRAMUSCULAR; INTRAVENOUS EVERY 6 HOURS PRN
Status: DISCONTINUED | OUTPATIENT
Start: 2022-08-24 | End: 2022-08-25 | Stop reason: HOSPADM

## 2022-08-24 RX ORDER — SODIUM CHLORIDE 0.9 % (FLUSH) 0.9 %
5-40 SYRINGE (ML) INJECTION PRN
Status: DISCONTINUED | OUTPATIENT
Start: 2022-08-24 | End: 2022-08-25 | Stop reason: HOSPADM

## 2022-08-24 RX ORDER — ASPIRIN 81 MG/1
81 TABLET, CHEWABLE ORAL DAILY
Status: DISCONTINUED | OUTPATIENT
Start: 2022-08-25 | End: 2022-08-25 | Stop reason: HOSPADM

## 2022-08-24 RX ORDER — ACETAMINOPHEN 325 MG/1
650 TABLET ORAL EVERY 4 HOURS PRN
Status: DISCONTINUED | OUTPATIENT
Start: 2022-08-24 | End: 2022-08-25 | Stop reason: HOSPADM

## 2022-08-24 RX ORDER — SODIUM CHLORIDE 9 MG/ML
INJECTION, SOLUTION INTRAVENOUS PRN
Status: DISCONTINUED | OUTPATIENT
Start: 2022-08-24 | End: 2022-08-25 | Stop reason: HOSPADM

## 2022-08-24 RX ADMIN — ATORVASTATIN CALCIUM 40 MG: 40 TABLET, FILM COATED ORAL at 20:13

## 2022-08-24 RX ADMIN — SODIUM CHLORIDE, PRESERVATIVE FREE 10 ML: 5 INJECTION INTRAVENOUS at 08:36

## 2022-08-24 RX ADMIN — INSULIN GLARGINE 35 UNITS: 100 INJECTION, SOLUTION SUBCUTANEOUS at 20:47

## 2022-08-24 RX ADMIN — PANTOPRAZOLE SODIUM 40 MG: 40 TABLET, DELAYED RELEASE ORAL at 19:08

## 2022-08-24 RX ADMIN — PANTOPRAZOLE SODIUM 40 MG: 40 TABLET, DELAYED RELEASE ORAL at 06:02

## 2022-08-24 RX ADMIN — ONDANSETRON 4 MG: 2 INJECTION INTRAMUSCULAR; INTRAVENOUS at 15:41

## 2022-08-24 RX ADMIN — CALCITRIOL CAPSULES 0.25 MCG 0.25 MCG: 0.25 CAPSULE ORAL at 08:35

## 2022-08-24 RX ADMIN — INSULIN LISPRO 12 UNITS: 100 INJECTION, SOLUTION INTRAVENOUS; SUBCUTANEOUS at 19:09

## 2022-08-24 RX ADMIN — ONDANSETRON 4 MG: 2 INJECTION INTRAMUSCULAR; INTRAVENOUS at 10:27

## 2022-08-24 RX ADMIN — SUCRALFATE 1 G: 1 TABLET ORAL at 06:02

## 2022-08-24 RX ADMIN — SODIUM CHLORIDE, PRESERVATIVE FREE 10 ML: 5 INJECTION INTRAVENOUS at 20:13

## 2022-08-24 RX ADMIN — ARIPIPRAZOLE 5 MG: 5 TABLET ORAL at 20:13

## 2022-08-24 RX ADMIN — SUCRALFATE 1 G: 1 TABLET ORAL at 19:08

## 2022-08-24 RX ADMIN — SERTRALINE HYDROCHLORIDE 100 MG: 50 TABLET ORAL at 08:35

## 2022-08-24 RX ADMIN — OXYCODONE AND ACETAMINOPHEN 1 TABLET: 5; 325 TABLET ORAL at 19:08

## 2022-08-24 RX ADMIN — SODIUM CHLORIDE, PRESERVATIVE FREE 10 ML: 5 INJECTION INTRAVENOUS at 20:49

## 2022-08-24 RX ADMIN — OXYCODONE AND ACETAMINOPHEN 1 TABLET: 5; 325 TABLET ORAL at 08:35

## 2022-08-24 ASSESSMENT — PAIN DESCRIPTION - LOCATION
LOCATION: LEG
LOCATION: LEG

## 2022-08-24 ASSESSMENT — PAIN SCALES - GENERAL
PAINLEVEL_OUTOF10: 7
PAINLEVEL_OUTOF10: 2
PAINLEVEL_OUTOF10: 6
PAINLEVEL_OUTOF10: 0

## 2022-08-24 ASSESSMENT — PAIN SCALES - WONG BAKER: WONGBAKER_NUMERICALRESPONSE: 0

## 2022-08-24 ASSESSMENT — PAIN DESCRIPTION - ORIENTATION
ORIENTATION: RIGHT;LEFT;LOWER
ORIENTATION: RIGHT;LEFT;LOWER

## 2022-08-24 ASSESSMENT — PAIN DESCRIPTION - DESCRIPTORS
DESCRIPTORS: ACHING
DESCRIPTORS: ACHING

## 2022-08-24 ASSESSMENT — PAIN DESCRIPTION - PAIN TYPE
TYPE: ACUTE PAIN
TYPE: ACUTE PAIN

## 2022-08-24 ASSESSMENT — PAIN DESCRIPTION - ONSET
ONSET: ON-GOING
ONSET: ON-GOING

## 2022-08-24 ASSESSMENT — PAIN DESCRIPTION - FREQUENCY
FREQUENCY: CONTINUOUS
FREQUENCY: CONTINUOUS

## 2022-08-24 NOTE — PROGRESS NOTES
V2.0  [facility] [place]      Name:  Vernette Opitz /Age/Sex: 1975  (52 y.o. male)   MRN & CSN:  [id number] & [id number] Encounter Date/Time: 2022 1:10 PM EDT    Location:  8657/6206-V PCP: Kevin Randle MD       Hospital Day: 3    Assessment and Plan:   Vernette Opitz is a 52 y.o. male who presents with chest tightness      Plan:  Chest tightness, NSTEMI  -occurred during dialysis. Cardiology following. On statin.  -Heart cath today. Anemia  -no gross bleeding. History of Sandra-Roberson tear on EGD . Antiplatelets and anticoagulation held. On Protonix and Carafate. Hemoglobin 6.6 today. Patient gave consent for blood transfusion. As per GI no EGD at this time, colonoscopy as outpatient.  -Improved hemoglobin 8.4. Transfused 2 unit PRBC. GI work-up as outpatient. Okay to restart anticoagulation and antiplatelets as per GI.  GI has signed off. ESRD on HD  -HD on MWF. Nephrology following. DM2  -hold home alogliptin. On insulin as per endocrinology  -A1c 7.1. Morbid obesity BMI 46. Diet Diet NPO Exceptions are: Sips of Water with Meds, Ice Chips    DVT Prophylaxis [] Lovenox, []  Heparin, [] SCDs, [] Ambulation,  [] Eliquis, [] Xarelto  [] Coumadin   Code Status Full Code   Disposition From: Home  Expected Disposition: Hem  Estimated Date of Discharge:   Patient requires continued admission due to anemia and chest pain   Home O2 None     Subjective/Interval history:   Chief Complaint: No chief complaint on file. Chest tightness has improved today. No bleeding. He states that his nurse told him he has been snoring and that his oxygen levels drop when he sleeps. Review of Systems:    Negative unless mentioned above    Objective:      Intake/Output Summary (Last 24 hours) at 2022 0932  Last data filed at 2022 0004  Gross per 24 hour   Intake 1075 ml   Output 2225 ml   Net -1150 ml        Vitals:   BP (!) 149/82   Pulse 85   Temp 97.9 °F (36.6 °C) (Oral)   Resp 22   Ht 5' 9\" (1.753 m)   Wt (!) 306 lb 4.8 oz (138.9 kg)   SpO2 90%   BMI 45.23 kg/m²     Physical Exam:   General: NAD, laying in bed  Eyes: no discharge  HENT: NCAT  Cardiovascular: RRR  Respiratory: Clear to auscultation   Gastrointestinal: Soft, nondistended, nontender  Genitourinary: no suprapubic tenderness  Musculoskeletal: No edema, nontender  Skin: warm, dry, no gross lesions  Neuro: Alert. No gross deficits  Psych: Mood appropriate.      Medications:   Medications:    [START ON 8/26/2022] epoetin paola-epbx  10,000 Units IntraVENous Once per day on Mon Wed Fri    ARIPiprazole  5 mg Oral Nightly    atorvastatin  40 mg Oral Nightly    calcitRIOL  0.25 mcg Oral Daily    pantoprazole  40 mg Oral BID AC    sertraline  100 mg Oral Daily    sucralfate  1 g Oral BID AC    sodium chloride flush  5-40 mL IntraVENous 2 times per day    insulin glargine  0.25 Units/kg SubCUTAneous Nightly    insulin lispro  12 Units SubCUTAneous TID WC    insulin lispro  0-4 Units SubCUTAneous TID WC    insulin lispro  0-4 Units SubCUTAneous Nightly      Infusions:    sodium chloride      sodium chloride      sodium chloride      dextrose       PRN Meds: sodium chloride, , PRN  sodium chloride, , PRN  oxyCODONE-acetaminophen, 1 tablet, Q8H PRN  traZODone, 50 mg, Nightly PRN  sodium chloride flush, 5-40 mL, PRN  sodium chloride, , PRN  ondansetron, 4 mg, Q8H PRN   Or  ondansetron, 4 mg, Q6H PRN  polyethylene glycol, 17 g, Daily PRN  aluminum & magnesium hydroxide-simethicone, 30 mL, Q6H PRN  acetaminophen, 650 mg, Q6H PRN   Or  acetaminophen, 650 mg, Q6H PRN  glucose, 4 tablet, PRN  dextrose bolus, 125 mL, PRN   Or  dextrose bolus, 250 mL, PRN  glucagon (rDNA), 1 mg, PRN  dextrose, , Continuous PRN  nitroGLYCERIN, 0.4 mg, Q5 Min PRN      Labs      Recent Labs     08/23/22  0712 08/24/22  0312   WBC 6.7  --    HGB 6.6* 8.4*   HCT 21.5* 26.6*     --       Recent Labs     08/22/22  0800 08/23/22  0712    137   K 4.3 4.3    102   CO2 23 25   BUN 68* 54*   CREATININE 7.1* 6.1*     No results for input(s): AST, ALT, ALB, BILIDIR, BILITOT, ALKPHOS in the last 72 hours.   Recent Labs     08/23/22  0712   INR 1.08     Recent Labs     08/23/22 0712   TROPONINT 0.115*     Lab Results   Component Value Date/Time    LABA1C 7.1 08/23/2022 07:15 AM     CALCIUM:  7.4/25 (08/23 0371)  Lab Results   Component Value Date/Time    MG 2.4 06/11/2021 06:09 AM           Electronically signed by Giles Perez MD on 8/24/2022 at 9:32 AM

## 2022-08-24 NOTE — PROGRESS NOTES
Ongoing chest pain with dialysis  Will plan cath to define coronary anatomy  Alternates and risk of the procedure were dicussed in detail  Patient is in agreement to proceed  Mallampati is 2  ASA is 2

## 2022-08-24 NOTE — PROGRESS NOTES
Pt seen in dialysis , received 15 min of txt. MD Lauro Do contacted this nurse via Phone to give order to end txt at the time, pt needs to go to Cath lab. Pts vitals stable, blood was rinsed back successfully. No fluid was removed. CVC lines in right subclavian was flushed and locked with saline, then capped. Pt put in transport to cath lab.      Txt overseen by Ahmet Boggs RN

## 2022-08-24 NOTE — PROGRESS NOTES
[facility]        Progress Note       2022  9:12 AM    Patient:    Kelli Castañeda  : 1975   52 y.o. MRN: [id number]  Admitted: 2022  7:59 PM ATT: Guadalupe Crocker MD   [id number]  AdmitDx: Chest tightness [R07.89]  Chest pain [R07.9]  PCP: Nathaly Ruiz MD    SUBJECTIVE:  Chart reviewed, events noted  Patient feeling okay. No complaints. No BM. Tolerating diet. Nauseated no vomiting. Chronic abdominal pain.     ROS:  The positive ROS will be identified in bold     CONSTITUTIONAL:  Neg  Weight loss, fatigue, fever  MOUTH/THROAT:  Neg  Bleeding gums, hoarseness or sore throat  RESPIRATORY:   Neg SOB, wheeze, cough, hemoptysis or bronchitis  CARDIOVASCULAR:  Neg Chest pain, palpitations, dyspnea on exertion, edema  GASTROINTESTINAL:  SEE HPI  HEMATOLOGIC/LYMPHATIC:  Neg  Anemia, bleeding tendency  MUSCULOSKELETAL: Neg  New myalgias, joint pain, swelling or stiffness  NEUROLOGICAL:  Neg  Loss of Consciousness, memory loss, forgetfulness, periods of confusion, difficulty concentrating, seizures, insomnia, aphasia    SKIN:  Neg No itching, rashes, or sores  PSYCHIATRIC:  Neg Depression, personality changes, anxiety    OBJECTIVE:      BP (!) 149/82   Pulse 85   Temp 97.9 °F (36.6 °C) (Oral)   Resp 22   Ht 5' 9\" (1.753 m)   Wt (!) 306 lb 4.8 oz (138.9 kg)   SpO2 90%   BMI 45.23 kg/m²     NAD, appears comfortable, lying in bed  Lips and mucous membranes pink and moist  RRR, Nl s1s2, no murmurs, no JVD  Lungs CTA bilaterally, respirations even and unlabored   Abdomen soft, ND, generalized tenderness, no HSM, bowel sounds normal  Skin pink, warm, dry and CR brisk   No edema bilateral lower extremities   AAOx3    CBC:   Recent Labs     22  0712 22  0312   WBC 6.7  --    HGB 6.6* 8.4*     --      BMP:    Recent Labs     22  0800 22  0712    137   K 4.3 4.3    102   CO2 23 25   BUN 68* 54*   CREATININE 7.1* 6.1*   GLUCOSE 127* 146*     Magnesium:   Lab Results   Component Value Date/Time    MG 2.4 06/11/2021 06:09 AM     Hepatic: No results for input(s): AST, ALT, ALB, BILITOT, ALKPHOS in the last 72 hours.   INR:   Recent Labs     08/23/22  0712   INR 1.08         Intake/Output Summary (Last 24 hours) at 8/24/2022 0912  Last data filed at 8/24/2022 0004  Gross per 24 hour   Intake 1075 ml   Output 2225 ml   Net -1150 ml         Medications    Scheduled Medications:    [START ON 8/26/2022] epoetin paola-epbx  10,000 Units IntraVENous Once per day on Mon Wed Fri    ARIPiprazole  5 mg Oral Nightly    atorvastatin  40 mg Oral Nightly    calcitRIOL  0.25 mcg Oral Daily    pantoprazole  40 mg Oral BID AC    sertraline  100 mg Oral Daily    sucralfate  1 g Oral BID AC    sodium chloride flush  5-40 mL IntraVENous 2 times per day    insulin glargine  0.25 Units/kg SubCUTAneous Nightly    insulin lispro  12 Units SubCUTAneous TID WC    insulin lispro  0-4 Units SubCUTAneous TID WC    insulin lispro  0-4 Units SubCUTAneous Nightly     PRN Medications: sodium chloride, sodium chloride, oxyCODONE-acetaminophen, traZODone, sodium chloride flush, sodium chloride, ondansetron **OR** ondansetron, polyethylene glycol, aluminum & magnesium hydroxide-simethicone, acetaminophen **OR** acetaminophen, glucose, dextrose bolus **OR** dextrose bolus, glucagon (rDNA), dextrose, nitroGLYCERIN  Infusions:    sodium chloride      sodium chloride      sodium chloride      dextrose           Patient Active Problem List   Diagnosis Code    Hiatal hernia K44.9    Diabetes mellitus type 2, improved controlled E11.65    Diabetic neuropathy ([facility] E11.40    Panic attack F41.0    Anxiety associated with depression F41.8    Neck pain M54.2    DANIELA (obstructive sleep apnea) G47.33    Urinary frequency R35.0    Bilateral carpal tunnel syndrome- left worse than right G56.03    Hyperlipidemia with target LDL less than 100 E78.5    Essential hypertension I10    Bronchitis J40 Osteomyelitis (HCC) M86.9    Abscess Y96.55    Periumbilical hernia I00.4    Sepsis (HCC) A41.9    Cellulitis of left foot L03.116    Constipation K59.00    Intractable nausea and vomiting R11.2    Uncontrolled type 2 diabetes mellitus (HCC) E11.65    Nausea and vomiting R11.2    Diabetic foot infection (HCC) E11.628, L08.9    Acute renal failure (ARF) (HCC) N17.9    Cellulitis L03.90    Cellulitis of left arm L03.114    Cellulitis, scrotum N49.2    Acute epididymitis N45.1    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    Recurrent major depressive disorder, in full remission ([facility] F33.42    Generalized anxiety disorder F41.1    Morbid obesity with body mass index (BMI) of 40.0 to 44.9 in adult [facility]) E66.01, Z68.41    Necrotizing fasciitis ([address] M72.6    Syncope R55    Right groin wound S31.109A    Ulcer of right groin with fat layer exposed ([address] L98.492    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 type 2 diabetes mellitus, with muscle involvement without evidence of necrosis (HCC) E11.621, L97.425    Nephrotic syndrome N04.9    Generalized edema R60.1    Stage 3b chronic kidney disease (HCC) N18.32    Diabetic nephropathy associated with type 2 diabetes mellitus ([facility]) E11.21    Hypoalbuminemia E88.09    Chronic kidney disease, stage IV (severe) ([facility]) N18.4    FH: CAD (coronary artery disease) Z82.49    ASCVD (arteriosclerotic cardiovascular disease) I25.10    Other proteinuria R80.8    Chest pain R07.9    Surgical wound dehiscence T81.31XA    CARMEN (acute kidney injury) ([address] N17.9    Anemia D64.9    Chest tightness R07.89        ASSESSMENT AND RECOMMENDATIONS:    Anemia:  Acute on chronic  Hgb 8.4, s/p 1 unit PRBCs bun/creat 54/6.1  No gross bleeding  ESRD, DM 2, found to have non-STEMI on admission  Plavix, ASA, Eliquis on hold  May be anemia of chronic disease     Recommend:  Monitor H&H, transfuse to keep Hgb greater than 7  Protonix twice daily  No egd at this time   Colonoscopy as outpatient    Okay to restart anticoagulation  We will sign off call if needed    Discussed plan of care with patient and RN    Patient clinical, biochemical, and radiological information discussed with Dr. Neil Childers. He agrees with the assessment and plan. APPLE Morales - CNP, CNP  8/24/2022  9:12 AM     Anemia multifactorial  No overt bleed  Predominantly because could be end-stage renal disease  EGD recently nondiagnostic other than Sandra-Roberson tear  Will need colonoscopy at some point in future  Continue present management    I have seen and examined the patient myself. I have reviewed the hospital care given to date and reviewed all pertinent labs and imaging. The plan was developed mutually at the time of visit with the patient, Gabriella Jameson CNP and myself. I have spoken with the patient, nursing staff and provided written and verbal instructions.      The above note has been reviewed and I agree with the assessment, diagnosis, and treatment plan with as suggested by Gabriella Jameson CNP with changes made by me as above      [address] [facility] gastroenterology

## 2022-08-24 NOTE — PROGRESS NOTES
Nephrology Progress Note        [address], [address] [place] [address]  Phone: [phone]  Office Hours: 8:30AM - 4:30PM  Monday - Friday 8/24/2022 8:01 AM  Subjective:   Admit Date: 8/22/2022  PCP: Shani Lucia MD  Interval History:   On room air  Feeling better    Diet: ADULT DIET; Regular; 4 carb choices (60 gm/meal)      Data:   Scheduled Meds:   [START ON 8/26/2022] epoetin paola-epbx  10,000 Units IntraVENous Once per day on Mon Wed Fri    ARIPiprazole  5 mg Oral Nightly    atorvastatin  40 mg Oral Nightly    calcitRIOL  0.25 mcg Oral Daily    pantoprazole  40 mg Oral BID AC    sertraline  100 mg Oral Daily    sucralfate  1 g Oral BID AC    sodium chloride flush  5-40 mL IntraVENous 2 times per day    insulin glargine  0.25 Units/kg SubCUTAneous Nightly    insulin lispro  12 Units SubCUTAneous TID WC    insulin lispro  0-4 Units SubCUTAneous TID WC    insulin lispro  0-4 Units SubCUTAneous Nightly     Continuous Infusions:   sodium chloride      sodium chloride      sodium chloride      dextrose       PRN Meds:sodium chloride, sodium chloride, oxyCODONE-acetaminophen, traZODone, sodium chloride flush, sodium chloride, ondansetron **OR** ondansetron, polyethylene glycol, aluminum & magnesium hydroxide-simethicone, acetaminophen **OR** acetaminophen, glucose, dextrose bolus **OR** dextrose bolus, glucagon (rDNA), dextrose, nitroGLYCERIN  I/O last 3 completed shifts: In: 1075 [Blood:575]  Out: 5296 [Urine:225]  No intake/output data recorded.     Intake/Output Summary (Last 24 hours) at 8/24/2022 0801  Last data filed at 8/24/2022 0004  Gross per 24 hour   Intake 1075 ml   Output 2225 ml   Net -1150 ml       CBC:   Recent Labs     08/23/22  0712 08/24/22  0312   WBC 6.7  --    HGB 6.6* 8.4*     --        BMP:    Recent Labs     08/22/22  0800 08/23/22  0712    137   K 4.3 4.3    102   CO2 23 25   BUN 68* 54*   CREATININE 7.1* 6.1*   GLUCOSE 127* 146* Hepatic: No results for input(s): AST, ALT, ALB, BILITOT, ALKPHOS in the last 72 hours. Troponin: No results for input(s): TROPONINI in the last 72 hours. BNP: No results for input(s): BNP in the last 72 hours. Lipids: No results for input(s): CHOL, HDL in the last 72 hours.     Invalid input(s): LDLCALCU  ABGs:   Lab Results   Component Value Date/Time    PO2ART 119 06/18/2013 01:15 PM    NER6EOS 47.0 06/18/2013 01:15 PM     INR:   Recent Labs     08/23/22 0712   INR 1.08       Objective:   Vitals: /68   Pulse 84   Temp 97.9 °F (36.6 °C) (Oral)   Resp 16   Ht 5' 9\" (1.753 m)   Wt (!) 306 lb 4.8 oz (138.9 kg)   SpO2 91%   BMI 45.23 kg/m²   General appearance: alert and cooperative with exam, in no acute distress  HEENT: normocephalic, atraumatic,   Neck: supple, trachea midline  Lungs: clear to auscultation bilaterally, breathing comfortably on room air  Heart[de-identified] regular rate and rhythm, S1, S2 normal,  Abdomen: soft, non-tender; non distended, +bowel sounds  Extremities: extremities atraumatic, no cyanosis or edema  Neurologic: alert, oriented, follows commands, interactive    Assessment and Plan:         Patient Active Problem List    Diagnosis Date Noted    Chest tightness 08/22/2022    CARMEN (acute kidney injury) ([facility] 07/25/2022    Anemia 07/25/2022    Recurrent major depressive disorder, in full remission ([facility] 05/18/2021    Generalized anxiety disorder 05/18/2021    Surgical wound dehiscence 02/08/2022    Chest pain 12/21/2021    Other proteinuria     FH: CAD (coronary artery disease) 09/29/2021    ASCVD (arteriosclerotic cardiovascular disease) 09/29/2021    Chronic kidney disease, stage IV (severe) ([facility] 09/28/2021    Nephrotic syndrome 09/22/2021    Generalized edema 09/22/2021    Stage 3b chronic kidney disease ([facility] 09/22/2021    Diabetic nephropathy associated with type 2 diabetes mellitus ([facility] 09/22/2021    Hypoalbuminemia 09/22/2021    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 type 2 diabetes mellitus, with muscle involvement without evidence of necrosis ([address] 09/21/2021    Right groin wound 06/09/2021    Ulcer of right groin with fat layer exposed ([address]     Syncope 06/05/2021    Necrotizing fasciitis ([address] 05/24/2021    Morbid obesity with body mass index (BMI) of 40.0 to 44.9 in adult [facility]) 05/21/2021    Irene gangrene     Cellulitis, scrotum 05/13/2021    Acute epididymitis     Cellulitis of left arm 04/03/2021    Cellulitis 03/23/2021    Acute renal failure (ARF) ([address] 08/04/2019    Diabetic foot infection ([address] 03/21/2019    Intractable nausea and vomiting 01/11/2019    Uncontrolled type 2 diabetes mellitus ([address] 01/11/2019    Nausea and vomiting 01/11/2019    Constipation 10/07/2018    Cellulitis of left foot     Sepsis ([address] 54/64/0313    Periumbilical hernia     Abscess 12/03/2017    Osteomyelitis ([address] 05/22/2015    Bronchitis 10/15/2013    Hyperlipidemia with target LDL less than 100 07/15/2013    Essential hypertension 07/15/2013    Bilateral carpal tunnel syndrome- left worse than right 06/24/2013    DANIELA (obstructive sleep apnea) 06/20/2013    Urinary frequency 06/20/2013    Neck pain 05/16/2013    Anxiety associated with depression     Panic attack 02/01/2012    Diabetic neuropathy ([address] 12/22/2011    Hiatal hernia 02/04/2011    Diabetes mellitus type 2, improved controlled 02/04/2011     S/p blood transfusion yesterday  Feeling better  HD today, he is 7kg above his dry weight  Continue supportive mgmt              Electronically signed by Maria Ines Castillo DO on 8/24/2022 at 8:01 MD Lorena Red DO [address] [facility], [place] [address]  PHONE: [phone]  FAX: [phone]

## 2022-08-24 NOTE — PROGRESS NOTES
Progress Note( Dr. Vy Lopez)  8/24/2022  Subjective:   Admit Date: 8/22/2022  PCP: Prosper Cui MD    Admitted For :Chest tightness during his hemodialysis treatment       Consulted For: Better control of blood glucose    Interval History: going for cardiac cath later today    Denies any chest pains,   Denies SOB . Denies nausea or vomiting. No new bowel or bladder symptoms.        Intake/Output Summary (Last 24 hours) at 8/24/2022 0711  Last data filed at 8/24/2022 0004  Gross per 24 hour   Intake 1075 ml   Output 2225 ml   Net -1150 ml       DATA    CBC:   Recent Labs     08/23/22  0712 08/24/22  0312   WBC 6.7  --    HGB 6.6* 8.4*     --     CMP:  Recent Labs     08/22/22  0800 08/23/22  0712    137   K 4.3 4.3    102   CO2 23 25   BUN 68* 54*   CREATININE 7.1* 6.1*   CALCIUM 7.6* 7.4*     Lipids:   Lab Results   Component Value Date/Time    CHOL 145 05/13/2021 10:28 AM    CHOL 168 10/15/2013 10:30 AM    HDL 29 05/13/2021 10:28 AM    TRIG 188 05/13/2021 10:28 AM     Glucose:  Recent Labs     08/23/22  1330 08/23/22  1621 08/23/22  2252   POCGLU 118* 179* 172*     [id number]:  Lab Results   Component Value Date/Time    LABA1C 7.1 08/23/2022 07:15 AM     High Sensitivity TSH:   Lab Results   Component Value Date/Time    TSHHS 3.860 07/25/2022 06:50 AM     Free T3: No results found for: FT3  Free T4:  Lab Results   Component Value Date/Time    T4FREE 1.25 07/24/2017 11:10 AM       No orders to display        Scheduled Medicines   Medications:    [START ON 8/26/2022] epoetin paola-epbx  10,000 Units IntraVENous Once per day on Mon Wed Fri    ARIPiprazole  5 mg Oral Nightly    atorvastatin  40 mg Oral Nightly    calcitRIOL  0.25 mcg Oral Daily    pantoprazole  40 mg Oral BID AC    sertraline  100 mg Oral Daily    sucralfate  1 g Oral BID AC    sodium chloride flush  5-40 mL IntraVENous 2 times per day    insulin glargine  0.25 Units/kg SubCUTAneous Nightly    insulin lispro  12 Units wound     Ulcer of right groin with fat layer exposed ([facility] [address]     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 type 2 diabetes mellitus, with muscle involvement without evidence of necrosis (HCC)     Nephrotic syndrome     Generalized edema     Stage 3b chronic kidney disease ([address]     Diabetic nephropathy associated with type 2 diabetes mellitus (HCC)     Hypoalbuminemia     Chronic kidney disease, stage IV (severe) (HCC)     FH: CAD (coronary artery disease)     ASCVD (arteriosclerotic cardiovascular disease)     Other proteinuria     Chest pain     Surgical wound dehiscence     CARMEN (acute kidney injury) ([facility] [address]     Anemia     Chest tightness      Plan:     Reviewed POC blood glucose . Labs and X ray results   Reviewed Current Medicines   On meal/ Correction bolus Humalog/ Basal Lantus Insulin regime / and Oral Hypoglycemic drugs   Monitor Blood glucose frequently   Modified  the dose of Insulin/ other medicines as needed   Going for cardiac cath later today  Will follow     .      Ja Ruiz MD, MD

## 2022-08-24 NOTE — PLAN OF CARE
Problem: Discharge Planning  Goal: Discharge to home or other facility with appropriate resources  Outcome: Progressing  Flowsheets (Taken 8/24/2022 0826)  Discharge to home or other facility with appropriate resources:   Identify barriers to discharge with patient and caregiver   Arrange for needed discharge resources and transportation as appropriate   Identify discharge learning needs (meds, wound care, etc)     Problem: Pain  Goal: Verbalizes/displays adequate comfort level or baseline comfort level  Outcome: Progressing     Problem: Safety - Adult  Goal: Free from fall injury  Outcome: Progressing

## 2022-08-25 VITALS
WEIGHT: 300.8 LBS | BODY MASS INDEX: 44.55 KG/M2 | OXYGEN SATURATION: 94 % | RESPIRATION RATE: 23 BRPM | SYSTOLIC BLOOD PRESSURE: 148 MMHG | HEIGHT: 69 IN | HEART RATE: 83 BPM | TEMPERATURE: 98.1 F | DIASTOLIC BLOOD PRESSURE: 86 MMHG

## 2022-08-25 LAB
ANION GAP SERPL CALCULATED.3IONS-SCNC: 11 MMOL/L (ref 4–16)
BUN BLDV-MCNC: 45 MG/DL (ref 6–23)
CALCIUM SERPL-MCNC: 7.6 MG/DL (ref 8.3–10.6)
CHLORIDE BLD-SCNC: 104 MMOL/L (ref 99–110)
CO2: 24 MMOL/L (ref 21–32)
CREAT SERPL-MCNC: 5.5 MG/DL (ref 0.9–1.3)
GFR AFRICAN AMERICAN: 14 ML/MIN/1.73M2
GFR NON-AFRICAN AMERICAN: 11 ML/MIN/1.73M2
GLUCOSE BLD-MCNC: 103 MG/DL (ref 70–99)
GLUCOSE BLD-MCNC: 117 MG/DL (ref 70–99)
GLUCOSE BLD-MCNC: 125 MG/DL (ref 70–99)
GLUCOSE BLD-MCNC: 146 MG/DL (ref 70–99)
HCT VFR BLD CALC: 26.7 % (ref 42–52)
HEMOGLOBIN: 8.4 GM/DL (ref 13.5–18)
MCH RBC QN AUTO: 29 PG (ref 27–31)
MCHC RBC AUTO-ENTMCNC: 31.5 % (ref 32–36)
MCV RBC AUTO: 92.1 FL (ref 78–100)
PDW BLD-RTO: 14.7 % (ref 11.7–14.9)
PLATELET # BLD: 233 K/CU MM (ref 140–440)
PMV BLD AUTO: 9.7 FL (ref 7.5–11.1)
POTASSIUM SERPL-SCNC: 4.4 MMOL/L (ref 3.5–5.1)
RBC # BLD: 2.9 M/CU MM (ref 4.6–6.2)
SODIUM BLD-SCNC: 139 MMOL/L (ref 135–145)
WBC # BLD: 7.9 K/CU MM (ref 4–10.5)

## 2022-08-25 PROCEDURE — 85027 COMPLETE CBC AUTOMATED: CPT

## 2022-08-25 PROCEDURE — 99232 SBSQ HOSP IP/OBS MODERATE 35: CPT | Performed by: INTERNAL MEDICINE

## 2022-08-25 PROCEDURE — 6360000002 HC RX W HCPCS: Performed by: INTERNAL MEDICINE

## 2022-08-25 PROCEDURE — 94761 N-INVAS EAR/PLS OXIMETRY MLT: CPT

## 2022-08-25 PROCEDURE — 80048 BASIC METABOLIC PNL TOTAL CA: CPT

## 2022-08-25 PROCEDURE — 6370000000 HC RX 637 (ALT 250 FOR IP): Performed by: INTERNAL MEDICINE

## 2022-08-25 PROCEDURE — 6370000000 HC RX 637 (ALT 250 FOR IP): Performed by: HOSPITALIST

## 2022-08-25 PROCEDURE — 82962 GLUCOSE BLOOD TEST: CPT

## 2022-08-25 PROCEDURE — 2580000003 HC RX 258: Performed by: STUDENT IN AN ORGANIZED HEALTH CARE EDUCATION/TRAINING PROGRAM

## 2022-08-25 PROCEDURE — 90935 HEMODIALYSIS ONE EVALUATION: CPT

## 2022-08-25 PROCEDURE — 6370000000 HC RX 637 (ALT 250 FOR IP): Performed by: STUDENT IN AN ORGANIZED HEALTH CARE EDUCATION/TRAINING PROGRAM

## 2022-08-25 RX ORDER — CLOPIDOGREL BISULFATE 75 MG/1
75 TABLET ORAL DAILY
Qty: 30 TABLET | Refills: 0 | Status: SHIPPED | OUTPATIENT
Start: 2022-08-25

## 2022-08-25 RX ORDER — ASPIRIN 81 MG/1
81 TABLET ORAL DAILY
Qty: 30 TABLET | Refills: 0 | Status: SHIPPED | OUTPATIENT
Start: 2022-08-25

## 2022-08-25 RX ADMIN — SERTRALINE HYDROCHLORIDE 100 MG: 50 TABLET ORAL at 12:05

## 2022-08-25 RX ADMIN — CLOPIDOGREL BISULFATE 75 MG: 75 TABLET ORAL at 12:05

## 2022-08-25 RX ADMIN — INSULIN LISPRO 12 UNITS: 100 INJECTION, SOLUTION INTRAVENOUS; SUBCUTANEOUS at 17:30

## 2022-08-25 RX ADMIN — PANTOPRAZOLE SODIUM 40 MG: 40 TABLET, DELAYED RELEASE ORAL at 05:10

## 2022-08-25 RX ADMIN — CALCITRIOL CAPSULES 0.25 MCG 0.25 MCG: 0.25 CAPSULE ORAL at 12:05

## 2022-08-25 RX ADMIN — SUCRALFATE 1 G: 1 TABLET ORAL at 17:27

## 2022-08-25 RX ADMIN — SODIUM CHLORIDE, PRESERVATIVE FREE 10 ML: 5 INJECTION INTRAVENOUS at 12:06

## 2022-08-25 RX ADMIN — OXYCODONE AND ACETAMINOPHEN 1 TABLET: 5; 325 TABLET ORAL at 12:05

## 2022-08-25 RX ADMIN — ONDANSETRON 4 MG: 2 INJECTION INTRAMUSCULAR; INTRAVENOUS at 05:26

## 2022-08-25 RX ADMIN — PANTOPRAZOLE SODIUM 40 MG: 40 TABLET, DELAYED RELEASE ORAL at 17:27

## 2022-08-25 RX ADMIN — SUCRALFATE 1 G: 1 TABLET ORAL at 05:10

## 2022-08-25 RX ADMIN — ASPIRIN 81 MG CHEWABLE TABLET 81 MG: 81 TABLET CHEWABLE at 12:05

## 2022-08-25 ASSESSMENT — PAIN SCALES - GENERAL: PAINLEVEL_OUTOF10: 8

## 2022-08-25 NOTE — PROGRESS NOTES
CARDIOLOGY  NOTE        Name:  Carly Matta /Age/Sex: 1975  (52 y.o. male)   MRN & CSN:  [id number] & [id number] Admission Date/Time: 2022  7:59 PM   Location:  [address] [place] [address] PCP: Angelito Arrington MD       Hospital Day: 4        PLAN FROM CARDIOLOGY FOR TODAY:   Continue with DAPT      - cardiology consult is for: Chest pain    -  Interval history: Had PCI done    ASSESSMENT/ PLAN:      Coronary disease had PCI done yesterday is on DAPT now we will continue  2. Hyperlipidemia patient is on Lipitor which will be continued and we will check the lipid profile  3. End-stage renal disease for nephrology for dialysis  4. Morbid obesity weight loss discussed          Subjective: Todays complain: Patient no chest pain    HPI:  Alea Ceja is a 52 y. o.year old who and presents with had no chief complaint listed for this encounter. No chief complaint on file. Objective: Temperature:  Current - Temp: 98.1 °F (36.7 °C);  Max - Temp  Av.5 °F (36.4 °C)  Min: 96.3 °F (35.7 °C)  Max: 98.7 °F (37.1 °C)    Respiratory Rate : Resp  Av.1  Min: 12  Max: 26    Pulse Range: Pulse  Av  Min: 80  Max: 87    Blood Presuure Range:  Systolic (79PPE), XLZ:291 , Min:124 , RKN:880   ; Diastolic (75EGN), VES:46, Min:80, Max:111      Pulse ox Range: SpO2  Av.4 %  Min: 90 %  Max: 98 %    24hr I & O:    Intake/Output Summary (Last 24 hours) at 2022 1025  Last data filed at 2022 1345  Gross per 24 hour   Intake 0 ml   Output 0 ml   Net 0 ml         /85   Pulse 80   Temp 98.1 °F (36.7 °C)   Resp 17   Ht 5' 9\" (1.753 m)   Wt (!) 306 lb 8 oz (139 kg)   SpO2 97%   BMI 45.26 kg/m²           Review of Systems:    No chest pain    TELEMETRY: Sinus rhythm   has a past medical history of Abscess, Acute renal failure (ARF) (HCC), Anxiety associated with depression, Anxiety associated with depression, Back pain, Chest pain, Diabetic paola-epbx  10,000 Units IntraVENous Once per day on Mon Wed Fri    ARIPiprazole  5 mg Oral Nightly    atorvastatin  40 mg Oral Nightly    calcitRIOL  0.25 mcg Oral Daily    pantoprazole  40 mg Oral BID AC    sertraline  100 mg Oral Daily    sucralfate  1 g Oral BID AC    sodium chloride flush  5-40 mL IntraVENous 2 times per day    insulin glargine  0.25 Units/kg SubCUTAneous Nightly    insulin lispro  12 Units SubCUTAneous TID WC    insulin lispro  0-4 Units SubCUTAneous TID WC    insulin lispro  0-4 Units SubCUTAneous Nightly      sodium chloride      sodium chloride      sodium chloride      sodium chloride      dextrose       oxyCODONE-acetaminophen, sodium chloride flush, sodium chloride, acetaminophen, ondansetron, sodium chloride, sodium chloride, oxyCODONE-acetaminophen, traZODone, sodium chloride flush, sodium chloride, ondansetron **OR** [DISCONTINUED] ondansetron, polyethylene glycol, aluminum & magnesium hydroxide-simethicone, [DISCONTINUED] acetaminophen **OR** acetaminophen, glucose, dextrose bolus **OR** dextrose bolus, glucagon (rDNA), dextrose, nitroGLYCERIN    Lab Data:  CBC:   Recent Labs     08/23/22  0712 08/24/22  0312 08/25/22  0835   WBC 6.7  --  7.9   HGB 6.6* 8.4* 8.4*   HCT 21.5* 26.6* 26.7*   MCV 93.1  --  92.1     --  233     BMP:   Recent Labs     08/23/22  0712 08/25/22  0835    139   K 4.3 4.4    104   CO2 25 24   BUN 54* 45*   CREATININE 6.1* 5.5*     LIVER PROFILE: No results for input(s): AST, ALT, LIPASE, BILIDIR, BILITOT, ALKPHOS in the last 72 hours. Invalid input(s): AMYLASE,  ALB  PT/INR:   Recent Labs     08/23/22 0712   PROTIME 13.9   INR 1.08     APTT: No results for input(s): APTT in the last 72 hours. BNP:  No results for input(s): BNP in the last 72 hours. TROPONIN: No results for input(s): TROPONINI in the last 72 hours.    Recent Labs     08/23/22 0712   TROPONINT 0.115*        Labs, consult, tests reviewed                    Ana Luisa Dutton MD, YONATHAN 8/25/2022 10:25 AM     Please note this report has been partially produced using speech recognition software and may contain errors related to that system including errors in grammar, punctuation, and spelling, as well as words and phrases that may be inappropriate. If there are any questions or concerns please feel free to contact the dictating provider for clarification.

## 2022-08-25 NOTE — PROGRESS NOTES
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)    FH: CAD (coronary artery disease)    ASCVD (arteriosclerotic cardiovascular disease)    Other proteinuria    Chest pain    Surgical wound dehiscence    CARMEN (acute kidney injury) ([facility])    Anemia    Chest tightness    NSTEMI (non-ST elevated myocardial infarction) ([facility] [address]         Treatment:  Hemodilaysis 2:1  Priority: Routine  Location: Acute Room    Diabetic: Yes  NPO: No  Isolation Precautions: None     Consent for Treatment Verified: Yes  Blood Consent Verified: Not Applicable     Safety Verified: Identify (I), Consent (C), Equipment (E), HepB Status (B), Orders Complete (O), Access Verified (A), and Timeliness (T)  Time out performed prior to access at 0825 hours. Report Received from Primary RN at 0730 hours. Primary RN (First Initial, Last Name, Title): DARREN Grayson RN  Incapacitated Nurse Education Completed: Not Applicable     HBsAg ONLY:  Date Drawn: August 5, 2022       Results: Negative  HBsAb:  Date Drawn:  August 25, 2022       Results: Unknown    Order  Dialysis Bath  K+ (Potassium): 2  Ca+ (Calcium): 2.5  Na+ (Sodium): 138  HCO3 (Bicarb): 30  Bicarbonate Concentrate Lot No.: [id number]  Acid Concentrate Lot No.: [id number]     Na+ Modeling: Not Applicable  Dialyzer: L822  Dialysate Temperature (C):  35  Blood Flow Rate (BFR):  350   Dialysate Flow Rate (DFR):   700        Access to be Utilized   Access:  Tunneled Catheter  Location: Subclavian  Side: Right   Needle gauge:  Not Applicable  + Bruit/Thrill: Not Applicable    First Use X-ray Verified: Yes  OK to use line order: Yes    Site Assessment:  Signs and Symptoms of Infection/Inflammation: None  If yes: Redness  Dressing: Dry and Intact  Site Prep: Medical Aseptic Technique  Dressing Changed this Treatment: Yes  If yes, by whom:  Sula Schwab LPN  Date of Last Dressing Change: August 22, 2022  Antimicrobial Patch in place?: Yes  Red Alcohol Caps in place?: Yes  Gauze Dressing?: No  Non Dialysis Use?: No  Comment:    Flows: Good, Patent  If access problem, who was notified:     Pre and Post-Assessment  Patient Vitals for the past 8 hrs:   Level of Consciousness Oriented X Heart Rhythm Respiratory Quality/Effort O2 Device Bilateral Breath Sounds Skin Condition/Temp Appetite Abdomen Inspection Bowel Sounds (All Quadrants) Edema Comments Pain Level   08/25/22 0804 -- -- -- -- -- -- -- -- Soft -- -- -- --   08/25/22 0812 0 -- -- -- -- -- -- -- -- -- -- -- --   08/25/22 0821 0 4 Regular Unlabored None (Room air) Clear Dry; Warm Good Soft Active None pre treatment vitals --   08/25/22 1205 -- -- -- -- -- -- -- -- -- -- -- -- 8     Labs  Recent Labs     08/23/22  0712 08/24/22  0312 08/25/22  0835   WBC 6.7  --  7.9   HGB 6.6* 8.4* 8.4*   HCT 21.5* 26.6* 26.7*     --  233                                                                  Recent Labs     08/23/22  0712 08/25/22  0835    139   K 4.3 4.4    104   CO2 25 24   BUN 54* 45*   CREATININE 6.1* 5.5*   GLUCOSE 146* 117*     IV Drips and Rate/Dose   sodium chloride      sodium chloride      sodium chloride      sodium chloride      dextrose        Safety - Before each treatment:   Dialysis Machine No.: [id number]   Machine Number: [id number]  Dialyzer Lot No.: [id number]  RO Machine Log Sheet Completed: Yes  Machine Alarm Self Test: Completed; Passed (08/25/22 0351)     Air Foam Detector: Tested, Proper Function, pH Reading  Extracorporeal Circuit Tested for Integrity: Yes  Machine Conductivity: 13.9  Manual Conductivity: 13.9     Bicarbonate Concentrate Lot No.: [id number]  Acid Concentrate Lot No.: [id number]  Manual Ph: 7.4  Bleach Test (Neg): Yes  Bath Temperature: 95 °F (35 °C)  Tubing Lot#: [id number]  Conductivity Meter Serial #: [id number]  All Connections Secure?: Yes  Venous Parameters Set?: Yes  Arterial Parameters Set?: Yes  Saline Line Double Clamped?: Yes  Air Foam Detector Engaged?: Yes  Machine Functioning Alarm Free?  Yes  Prime Given: 200ml    Chlorine Testing - Before each treatment and every 4 hours:   Treatment  Time On: 0832  Time Off: 1120  Treatment Goal: 2.8kg  Weight: (!) 300 lb 12.8 oz (136.4 kg) (08/25/22 1120)  1st check: less than 0.1 ppm at: 0645 hours  2nd check: less than 0.1 ppm at: 0945 hours  3rd check: Not Applicable  (if greater than 0.1 ppm, then check every 30 minutes from secondary)    Access Flows and Pressures  Patient Vitals for the past 8 hrs:   Blood Flow Rate (ml/min) Ultrafiltration Rate (ml/hr) Arterial Pressure (mmHg) Venous Pressure (mmHg) TMP DFR Comments Access Visible   08/25/22 0832 200 ml/min 1100 ml/hr -50 mmHg 50 50 700 tx initiated Yes   08/25/22 0845 350 ml/min 1100 ml/hr -160 mmHg 130 40 700 lines secure Yes   08/25/22 0900 350 ml/min 1100 ml/hr -190 mmHg 140 40 700 resting with eyes closed Yes   08/25/22 0915 350 ml/min 1100 ml/hr -200 mmHg 130 40 700 no complaints Yes   08/25/22 0930 350 ml/min 1100 ml/hr -190 mmHg 130 40 700 no distress Yes   08/25/22 0945 350 ml/min 1100 ml/hr -190 mmHg 130 40 700 denies needs Yes   08/25/22 1000 350 ml/min 1100 ml/hr -200 mmHg 130 40 700 vss Yes   08/25/22 1015 350 ml/min 1100 ml/hr -190 mmHg 130 40 700 no change in condition Yes   08/25/22 1035 350 ml/min 1100 ml/hr -190 mmHg 140 40 700 RESTING WITH EYES CLOSED Yes   08/25/22 1045 350 ml/min 1100 ml/hr -160 mmHg 280 40 700 snoring Yes   08/25/22 1100 350 ml/min 1100 ml/hr -140 mmHg 370 30 700 lines secure Yes   08/25/22 1115 350 ml/min 1110 ml/hr -110 mmHg 320 40 700 no distress Yes   08/25/22 1120 -- -- -- -- -- -- tx ended Yes     Vital Signs  Patient Vitals for the past 8 hrs:   BP Temp Pulse Resp SpO2 Weight Percent Weight Change   08/25/22 0500 (!) 154/87 98.7 °F (37.1 °C) 80 16 -- -- --   08/25/22 0800 (!) 157/80 (!) 96.3 °F (35.7 °C) -- -- 95 % -- --   08/25/22 0812 (!) 157/80 (!) 96.3 °F (35.7 °C) 80 16 -- -- --   08/25/22 0821 (!) 159/89 98.1 °F (36.7 °C) 83 21 95 % (!) 306 lb 8 oz (139 kg) 0.13   08/25/22 0832 (!) 158/93 -- 82 -- -- -- --   08/25/22 0845 (!) 133/111 -- 81 -- -- -- --   08/25/22 0900 (!) 170/109 -- 82 21 90 % -- --   08/25/22 0915 (!) 166/111 -- 81 26 90 % -- --   08/25/22 0930 (!) 162/96 -- 80 17 97 % -- --   08/25/22 0945 136/86 -- 82 -- -- -- --   08/25/22 1000 (!) 158/98 -- 80 -- -- -- --   08/25/22 1015 124/85 -- 80 -- -- -- --   08/25/22 1035 (!) 169/89 -- 80 18 -- -- --   08/25/22 1045 (!) 148/90 -- 81 18 -- -- --   08/25/22 1100 (!) 145/83 -- 81 -- -- -- --   08/25/22 1115 (!) 146/81 -- 82 -- -- -- --   08/25/22 1120 (!) 148/86 98.1 °F (36.7 °C) 81 23 94 % (!) 300 lb 12.8 oz (136.4 kg) -1.86     Post-Dialysis  Arterial Catheter Locking Solution:  saline  Venous Catheter Locking Solution:  saline  Post-Treatment Procedures: Blood returned, Catheter Capped, clamped with Saline x2 ports  Machine Disinfection Process: Acid/Vinegar Clean, Heat Disinfect, Exterior Machine Disinfection  Rinseback Volume (ml): 300 ml  Blood Volume Processed (Liters): 53.2 l/min  Dialyzer Clearance: Heavily streaked  Duration of Treatment (minutes): 180 minutes     Hemodialysis Intake (ml): 500 ml  Hemodialysis Output (ml): 3046 ml     Tolerated Treatment: Good  Patient Response to Treatment: good  Physician Notified: Yes       Provider Notification        Handoff complete and report given to Primary RN at 1120 hours. Primary RN (First Initial, Last Name, Title):   [facility] RN     Education  Person Educated: Patient   Knowledge Base: Substantial  Barriers to Learning?: None  Preferred method of Learning: Oral  Topic(s): Access Care, Signs and Symptoms of Infection, and Fluid Management   Teaching Tools: Explanation Response to Education: Verbalized Understanding     Electronically signed by Yin Valera LPN on 7/27/9930 at 40:83 PM

## 2022-08-25 NOTE — DISCHARGE SUMMARY
V2.0  Discharge Summary    Name:  Carly Matta /Age/Sex: 1975 (17 y.o. male)   Admit Date: 2022  Discharge Date: 22    MRN & CSN:  [id number] & [id number] Encounter Date and Time 22 5:21 PM EDT    Attending:  Jaswant Pierre MD Discharging Provider: Jaswant Pierre MD       Hospital Course:     HPI:   Chief Complaint: Chest tightness     Patient is a 80-year-old male with a PMHx of CAD s/p PCI in 2021, recurrent syncope, Sandra-Roberson tear, HTN, HLD, T2DM and ESRD on HD since  who presented to the ED from dialysis clinic due to chest tightness. Reported that during HD treatment he developed gradual chest tightness that improved after the discontinuation of the treatment. Upon further questioning, patient reported that he has similar issues during previous treatments which resolved after completion of the treatment. Also reported having weakness for the past few days and chills but denied any fevers, SOB, CP, syncope, nausea, vomiting, abdominal pain, constipation, diarrhea or dysuria (still produces minimal urine output). Denied having any melena recently or any other sources of bleeding. Patient is compliant with all medications including ASA, Plavix and Eliquis. In [facility] ED, his Tn was found to be mildly elevated but plateaued (79 then 82 on [facility] RENETTA assay). ECG without acute ischemic changes again. CTH was negative for any acute changes. ED provider discussed with Cardiologist who recommended transfer for observation. Problem Based Course:   Patient came in for chest tightness. He appeared to have an NSTEMI. Recurred during dialysis. He had a heart cath and had a stent placed. Patient was placed on aspirin and Plavix and beta-blocker. He was on a statin. Patient had anemia. There was no gross bleeding. He has a history of Sandra-Roberson tears on EGD on . Patient was placed on Protonix and Carafate. Hemoglobin was 6.6.   He was transfused 2 units PRBC during his stay. As per GI no EGD required at this time. GI recommended colonoscopy as an outpatient. Hemoglobin improved. He was transfused 2 units PRBC during his stay. He was resumed back on his antiplatelets. Patient states he is not on Eliquis. Patient was stable. He was discharged to home. Consults this admission:  IP CONSULT TO NEPHROLOGY  IP CONSULT TO GI  IP CONSULT TO ENDOCRINOLOGY  IP CONSULT TO CARDIOLOGY  IP CONSULT TO IV TEAM  IP CONSULT TO CARDIAC REHAB  IP CONSULT TO CARDIAC REHAB    Discharge Diagnosis:     Chest tightness, NSTEMI  Anemia  ESRD on HD  DM2  Morbid obesity BMI 46. Discharge Instruction:   Medications: see computerized discharge medication list  Activity: activity as tolerated  Diet: cardiac diet and diabetic diet   Disposition: home  Discharged Condition: Stable      Discharge Medications:        Medication List        START taking these medications      aspirin 81 MG EC tablet  Take 1 tablet by mouth daily     clopidogrel 75 MG tablet  Commonly known as: PLAVIX  Take 1 tablet by mouth daily            CHANGE how you take these medications      metoprolol tartrate 25 MG tablet  Commonly known as: LOPRESSOR  Take 0.5 tablets by mouth 2 times daily  What changed: how much to take            CONTINUE taking these medications      alogliptin 6.25 MG Tabs tablet  Commonly known as: NESINA  Take 1 tablet by mouth in the morning. ARIPiprazole 5 MG tablet  Commonly known as: ABILIFY  Take 1 tablet by mouth at bedtime     atorvastatin 40 MG tablet  Commonly known as: LIPITOR  Take 1 tablet by mouth nightly     calcitRIOL 0.25 MCG capsule  Commonly known as: ROCALTROL  Take 1 capsule by mouth in the morning. glucose monitoring kit  1 each by Does not apply route once for 1 dose. insulin lispro (1 Unit Dial) 100 UNIT/ML Sopn  Commonly known as: HumaLOG KwikPen  Inject 20 Units into the skin in the morning and 20 Units at noon and 20 Units in the evening. Inject before meals. Lancets Misc  1 each by Does not apply route daily     Lantus SoloStar 100 UNIT/ML injection pen  Generic drug: insulin glargine  Inject 45 Units into the skin nightly     ondansetron 4 MG tablet  Commonly known as: Zofran  Take 1 tablet by mouth every 8 hours as needed for Nausea or Vomiting     oxyCODONE-acetaminophen 5-325 MG per tablet  Commonly known as: PERCOCET     pantoprazole 40 MG tablet  Commonly known as: PROTONIX  Take 1 tablet by mouth in the morning and 1 tablet in the evening. Take before meals. sertraline 50 MG tablet  Commonly known as: ZOLOFT  Take 2 tablets by mouth daily     sucralfate 1 GM tablet  Commonly known as: CARAFATE  Take 1 tablet by mouth in the morning and 1 tablet in the evening. Take before meals.      traZODone 50 MG tablet  Commonly known as: DESYREL  Take 1 tablet by mouth nightly as needed for Sleep            STOP taking these medications      amLODIPine 10 MG tablet  Commonly known as: NORVASC     apixaban 2.5 MG Tabs tablet  Commonly known as: Eliquis     chlorthalidone 25 MG tablet  Commonly known as: HYGROTON     furosemide 40 MG tablet  Commonly known as: Lasix     insulin aspart 100 UNIT/ML injection vial  Commonly known as: NOVOLOG     linagliptin 5 MG tablet  Commonly known as: TRADJENTA               Where to Get Your Medications        These medications were sent to [facility] [id number] [place] [address] [address] [address] [address] [facility] [phone]  [phone] [facility], [address] [place] [address]      Phone: [phone]   aspirin 81 MG EC tablet  clopidogrel 75 MG tablet  metoprolol tartrate 25 MG tablet        Objective Findings at Discharge:   BP (!) 148/86   Pulse 83   Temp 98.1 °F (36.7 °C)   Resp 23   Ht 5' 9\" (1.753 m)   Wt (!) 300 lb 12.8 oz (136.4 kg)   SpO2 94%   BMI 44.42 kg/m²       Physical Exam:   General: NAD, sitting on edge of bed  Eyes: no discharge  HENT: NCAT  Cardiovascular: RRR  Respiratory: Clear to auscultation b/l bs+  Gastrointestinal: Soft, nondistended, nontender  Genitourinary: no suprapubic tenderness  Musculoskeletal: trace edema, nontender  Skin: warm, dry, no gross lesions  Neuro: Alert. No gross deficits  Psych: Mood appropriate.         Imaging   none    Time Spent Discharging patient 29 minutes    Electronically signed by Jatin Zambrano MD on 8/25/2022 at 5:21 PM

## 2022-08-25 NOTE — DISCHARGE INSTRUCTIONS
Medications: see computerized discharge medication list  Activity: activity as tolerated  Diet: cardiac diet and diabetic diet   Disposition: home  Discharged Condition: Stable

## 2022-08-25 NOTE — PROGRESS NOTES
Progress Note( Dr. Jagruti Harmon)  8/25/2022  Subjective:   Admit Date: 8/22/2022  PCP: Joan Araiza MD    Admitted For :Chest tightness during his hemodialysis treatment       Consulted For: Better control of blood glucose    Interval History: Had cardiac cath l8/23/2022   Results noted below    Denies any chest pains,   Denies SOB . Denies nausea or vomiting. No new bowel or bladder symptoms. Intake/Output Summary (Last 24 hours) at 8/25/2022 0729  Last data filed at 8/24/2022 1345  Gross per 24 hour   Intake 0 ml   Output 0 ml   Net 0 ml         DATA    CBC:   Recent Labs     08/23/22  0712 08/24/22  0312   WBC 6.7  --    HGB 6.6* 8.4*     --       CMP:  Recent Labs     08/22/22  0800 08/23/22  0712    137   K 4.3 4.3    102   CO2 23 25   BUN 68* 54*   CREATININE 7.1* 6.1*   CALCIUM 7.6* 7.4*       Lipids:   Lab Results   Component Value Date/Time    CHOL 145 05/13/2021 10:28 AM    CHOL 168 10/15/2013 10:30 AM    HDL 29 05/13/2021 10:28 AM    TRIG 188 05/13/2021 10:28 AM     Glucose:  Recent Labs     08/24/22  1809 08/24/22 2021 08/25/22  0649   POCGLU 133* 172* 125*       [id number]:  Lab Results   Component Value Date/Time    LABA1C 7.1 08/23/2022 07:15 AM     High Sensitivity TSH:   Lab Results   Component Value Date/Time    TSHHS 3.860 07/25/2022 06:50 AM     Free T3: No results found for: FT3  Free T4:  Lab Results   Component Value Date/Time    T4FREE 1.25 07/24/2017 11:10 AM       No orders to display       Procedure Summary cardiac cath done on 8/24/2022   Access : femoral Indication: NSTEMI   1. Circ has 90 % long lesion in distal segment , s/p PCI for 90   % lesion reduced to 0 % with Divina III flow using 2.5 X 22 MICAELA   2.  LAD stent is patent, RCA is patent   LVEDP was 20 m,mHG   Pt is referred to Cardiac Rehab Phase 2 as OP  Scheduled Medicines   Medications:    sodium chloride flush  5-40 mL IntraVENous 2 times per day    aspirin  81 mg Oral Daily    clopidogrel  75 mg Oral Daily    [START ON 8/26/2022] epoetin paola-epbx  10,000 Units IntraVENous Once per day on Mon Wed Fri    ARIPiprazole  5 mg Oral Nightly    atorvastatin  40 mg Oral Nightly    calcitRIOL  0.25 mcg Oral Daily    pantoprazole  40 mg Oral BID AC    sertraline  100 mg Oral Daily    sucralfate  1 g Oral BID AC    sodium chloride flush  5-40 mL IntraVENous 2 times per day    insulin glargine  0.25 Units/kg SubCUTAneous Nightly    insulin lispro  12 Units SubCUTAneous TID WC    insulin lispro  0-4 Units SubCUTAneous TID WC    insulin lispro  0-4 Units SubCUTAneous Nightly      Infusions:    sodium chloride      sodium chloride      sodium chloride      sodium chloride      dextrose           Objective:   Vitals: BP (!) 154/87   Pulse 80   Temp 98.7 °F (37.1 °C) (Oral)   Resp 16   Ht 5' 9\" (1.753 m)   Wt (!) 306 lb 1.6 oz (138.8 kg)   SpO2 98%   BMI 45.20 kg/m²   General appearance: alert and cooperative with exam  Neck: no JVD or bruit  Thyroid : Normal lobes   Lungs: Has Vesicular Breath sounds   Heart:  regular rate and rhythm  Abdomen: soft, non-tender; bowel sounds normal; no masses,  no organomegaly  Musculoskeletal: Normal  Extremities: extremities normal, , no edema  Neurologic:  Awake, alert, oriented to name, place and time. Cranial nerves II-XII are grossly intact. Motor is  intact. Sensory is intact. ,  and gait is normal.    Assessment:     Patient Active Problem List:     Hiatal hernia     Diabetes mellitus type 2, improved controlled     Diabetic neuropathy (HCC)     Panic attack     Anxiety associated with depression     Neck pain     DANIELA (obstructive sleep apnea)     Urinary frequency     Bilateral carpal tunnel syndrome- left worse than right     Hyperlipidemia with target LDL less than 100     Essential hypertension     Bronchitis     Osteomyelitis (HCC)     Abscess     Periumbilical hernia     Sepsis ([facility]     Cellulitis of left foot     Constipation     Intractable nausea and vomiting Uncontrolled type 2 diabetes mellitus (HCC)     Nausea and vomiting     Diabetic foot infection ([address]     Acute renal failure (ARF) (HCC)     Cellulitis     Cellulitis of left arm     Cellulitis, scrotum     Acute epididymitis     Irene gangrene     Recurrent major depressive disorder, in full remission ([address]     Generalized anxiety disorder     Morbid obesity with body mass index (BMI) of 40.0 to 44.9 in adult [facility])     Necrotizing fasciitis ([address]     Syncope     Right groin wound     Ulcer of right groin with fat layer exposed ([address]     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 type 2 diabetes mellitus, with muscle involvement without evidence of necrosis ([facility])     Nephrotic syndrome     Generalized edema     Stage 3b chronic kidney disease ([address]     Diabetic nephropathy associated with type 2 diabetes mellitus (HCC)     Hypoalbuminemia     Chronic kidney disease, stage IV (severe) ([facility])     FH: CAD (coronary artery disease)     ASCVD (arteriosclerotic cardiovascular disease)     Other proteinuria     Chest pain     Surgical wound dehiscence     CARMEN (acute kidney injury) ([address]     Anemia     Chest tightness  CAD/cardiac stent      Plan:     Reviewed POC blood glucose . Labs and X ray results   Reviewed Current Medicines   On meal/ Correction bolus Humalog/ Basal Lantus Insulin regime / and Oral Hypoglycemic drugs   Monitor Blood glucose frequently   Modified  the dose of Insulin/ other medicines as needed   Going for cardiac cath later today  Will follow     .      Imelda Knight MD, MD

## 2022-08-25 NOTE — PROGRESS NOTES
CBC:   Recent Labs     08/23/22  0712 08/24/22  0312   WBC 6.7  --    HGB 6.6* 8.4*     --        BMP:    Recent Labs     08/22/22  0800 08/23/22  0712    137   K 4.3 4.3    102   CO2 23 25   BUN 68* 54*   CREATININE 7.1* 6.1*   GLUCOSE 127* 146*     Hepatic: No results for input(s): AST, ALT, ALB, BILITOT, ALKPHOS in the last 72 hours. Troponin: No results for input(s): TROPONINI in the last 72 hours. BNP: No results for input(s): BNP in the last 72 hours. Lipids: No results for input(s): CHOL, HDL in the last 72 hours.     Invalid input(s): LDLCALCU  ABGs:   Lab Results   Component Value Date/Time    PO2ART 119 06/18/2013 01:15 PM    FMD9KJI 47.0 06/18/2013 01:15 PM     INR:   Recent Labs     08/23/22  0712   INR 1.08       Objective:   Vitals: BP (!) 154/87   Pulse 80   Temp 98.7 °F (37.1 °C) (Oral)   Resp 16   Ht 5' 9\" (1.753 m)   Wt (!) 306 lb 1.6 oz (138.8 kg)   SpO2 98%   BMI 45.20 kg/m²   General appearance: alert and cooperative with exam, in no acute distress  HEENT: normocephalic, atraumatic,   Neck: supple, trachea midline  Lungs: clear to auscultation bilaterally, breathing comfortably on room air  Heart[de-identified] regular rate and rhythm, S1, S2 normal,  Abdomen: soft, non-tender; non distended, +bowel sounds  Extremities: extremities atraumatic, no cyanosis or edema  Neurologic: alert, oriented, follows commands, interactive    Assessment and Plan:     Patient Active Problem List    Diagnosis Date Noted    NSTEMI (non-ST elevated myocardial infarction) ([facility] [address] 08/24/2022    Chest tightness 08/22/2022    CARMEN (acute kidney injury) ([facility] [address] 07/25/2022    Anemia 07/25/2022    Recurrent major depressive disorder, in full remission ([facility] [address] 05/18/2021    Generalized anxiety disorder 05/18/2021    Surgical wound dehiscence 02/08/2022    Chest pain 12/21/2021    Other proteinuria     FH: CAD (coronary artery disease) 09/29/2021    ASCVD (arteriosclerotic cardiovascular disease) 09/29/2021 Chronic kidney disease, stage IV (severe) ([address] 09/28/2021    Nephrotic syndrome 09/22/2021    Generalized edema 09/22/2021    Stage 3b chronic kidney disease ([address] 09/22/2021    Diabetic nephropathy associated with type 2 diabetes mellitus ([address] 09/22/2021    Hypoalbuminemia 09/22/2021    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 type 2 diabetes mellitus, with muscle involvement without evidence of necrosis ([address] 09/21/2021    Right groin wound 06/09/2021    Ulcer of right groin with fat layer exposed ([address]     Syncope 06/05/2021    Necrotizing fasciitis ([address] 05/24/2021    Morbid obesity with body mass index (BMI) of 40.0 to 44.9 in adult ([address] 05/21/2021    Irene gangrene     Cellulitis, scrotum 05/13/2021    Acute epididymitis     Cellulitis of left arm 04/03/2021    Cellulitis 03/23/2021    Acute renal failure (ARF) ([address] 08/04/2019    Diabetic foot infection ([address] 03/21/2019    Intractable nausea and vomiting 01/11/2019    Uncontrolled type 2 diabetes mellitus ([address] 01/11/2019    Nausea and vomiting 01/11/2019    Constipation 10/07/2018    Cellulitis of left foot     Sepsis ([address] 41/96/6026    Periumbilical hernia     Abscess 12/03/2017    Osteomyelitis ([address] 05/22/2015    Bronchitis 10/15/2013    Hyperlipidemia with target LDL less than 100 07/15/2013    Essential hypertension 07/15/2013    Bilateral carpal tunnel syndrome- left worse than right 06/24/2013    DANIELA (obstructive sleep apnea) 06/20/2013    Urinary frequency 06/20/2013    Neck pain 05/16/2013    Anxiety associated with depression     Panic attack 02/01/2012    Diabetic neuropathy ([address] 12/22/2011    Hiatal hernia 02/04/2011    Diabetes mellitus type 2, improved controlled 02/04/2011     S/p PCI to circumflex  HD today as she is 7kg above his dry weight  If he gets dischsrged today, he knows to go to this outpt center tomorrow  Retacrit given in HD                  Electronically signed by Celine Blizzard, DO on 8/25/2022 at 7:55 AM    [facility]  Zakiya Jameson MD  [id number] , DO  [address],  [address]  [place], [place]  PHONE: [phone]  FAX: [phone]

## 2022-08-25 NOTE — PROGRESS NOTES
Physician Progress Note      Michael Henderson  CSN #:                  [id number]  :                       1975  ADMIT DATE:       2022 7:59 PM  [address] [place] DATE:  RESPONDING  PROVIDER #:        Macario Barger MD          QUERY TEXT:    Pt admitted with anemia. Noted documentation of acute blood loss anemia on   22 by Dr. Trinidad Gonzalez, cardiology consultant. If possible, please document in   progress notes and discharge summary:    The medical record reflects the following:  Risk Factors: rogelio rodney tear noted 2022, ESRD  Clinical Indicators: Pt admitted with Hgb of 6.6 on 22. Hgb increased to   8.4 on 22. Pt denies melena or other known bleeding. EGD done 2022   showed a [employer] tear with no active bleeding. Pt does have ESRD. PN by   Dr. Trinidad Gonzalez, dated 22, \"Chest pain and elevated troponin: Most likely   combination of acute blood loss anemia and pre-existing heart condition   however Stable 9.2-6.6 here patient EKG is normal does not have any ST   elevation or severe ischemic signs patient is not stable for heart cath this   time because of severe anemia. \"  Treatment: PRBC transfusion, monitor H/H    Thank you,  Josiah Montejo RN  [phone]  Options provided:  -- Acute blood loss anemia confirmed present on admission  -- Acute blood loss anemia ruled out  -- Other - I will add my own diagnosis  -- Disagree - Not applicable / Not valid  -- Disagree - Clinically unable to determine / Unknown  -- Refer to Clinical Documentation Reviewer    PROVIDER RESPONSE TEXT:    Possible acute blood loss anemia    Query created by: Jeny Carrillo on 2022 11:37 AM      QUERY TEXT:    Patient admitted with anemia. Noted documentation of NSTEMI Type II in H&P   by Dr. Mojgan Kamara and NSTEMI in PN dated 22 by Dr. Buffy Tinoco. If possible, please document in progress notes and discharge summary if you   are evaluating and /or treating any of the following:     The medical record reflects the following:  Risk Factors:  HD, anemia  Clinical Indicators: Pt present with chest pain. 8/23 Trop was 0.115. Cardiologist, Dr. Lawanda Mcghee, indicates chest pain is most likely combination of   acute blood loss anemia and pre-existing heart condition EKG is normal does   not have any ST elevation or severe ischemic signs. LHC may be performed once   anemia is stablized. Pt continues to have chest pain during HD  Treatment: EKG, Cardiology consult, labs    Thank you,  Mallika Arreguin, RN  [phone]  Options provided:  -- NSTEMI Type II confirmed and NSTEMI Type I ruled out  -- NSTEMI Type I confirmed and NSTEMI Type II ruled out  -- Other - I will add my own diagnosis  -- Disagree - Not applicable / Not valid  -- Disagree - Clinically unable to determine / Unknown  -- Refer to Clinical Documentation Reviewer    PROVIDER RESPONSE TEXT:    After study, NSTEMI Type I confirmed and NSTEMI Type II ruled out.     Query created by: Shaw Buckner on 8/24/2022 11:45 AM      Electronically signed by:  Roby Vivas MD 8/25/2022 7:50 AM

## 2022-09-01 ENCOUNTER — HOSPITAL ENCOUNTER (OUTPATIENT)
Dept: WOUND CARE | Age: 47
Discharge: HOME | End: 2022-09-01
Payer: MEDICARE

## 2022-09-01 VITALS — RESPIRATION RATE: 23 BRPM | HEART RATE: 81 BPM | TEMPERATURE: 98.5 F

## 2022-09-01 DIAGNOSIS — E11.621 ULCER OF FOREFOOT DUE TO TYPE 2 DIABETES MELLITUS (HCC): Primary | ICD-10-CM

## 2022-09-01 DIAGNOSIS — L97.509 ULCER OF FOREFOOT DUE TO TYPE 2 DIABETES MELLITUS (HCC): Primary | ICD-10-CM

## 2022-09-01 DIAGNOSIS — E11.42 DIABETIC POLYNEUROPATHY ASSOCIATED WITH TYPE 2 DIABETES MELLITUS (HCC): Chronic | ICD-10-CM

## 2022-09-01 PROCEDURE — 99213 OFFICE O/P EST LOW 20 MIN: CPT | Performed by: SURGERY

## 2022-09-01 PROCEDURE — 11042 DBRDMT SUBQ TIS 1ST 20SQCM/<: CPT | Performed by: SURGERY

## 2022-09-01 PROCEDURE — 99213 OFFICE O/P EST LOW 20 MIN: CPT

## 2022-09-01 PROCEDURE — 11042 DBRDMT SUBQ TIS 1ST 20SQCM/<: CPT

## 2022-09-01 RX ORDER — LIDOCAINE 50 MG/G
OINTMENT TOPICAL ONCE
Status: CANCELLED | OUTPATIENT
Start: 2022-09-01 | End: 2022-09-01

## 2022-09-01 RX ORDER — GINSENG 100 MG
CAPSULE ORAL ONCE
Status: CANCELLED | OUTPATIENT
Start: 2022-09-01 | End: 2022-09-01

## 2022-09-01 RX ORDER — BETAMETHASONE DIPROPIONATE 0.05 %
OINTMENT (GRAM) TOPICAL ONCE
Status: CANCELLED | OUTPATIENT
Start: 2022-09-01 | End: 2022-09-01

## 2022-09-01 RX ORDER — BACITRACIN, NEOMYCIN, POLYMYXIN B 400; 3.5; 5 [USP'U]/G; MG/G; [USP'U]/G
OINTMENT TOPICAL ONCE
Status: CANCELLED | OUTPATIENT
Start: 2022-09-01 | End: 2022-09-01

## 2022-09-01 RX ORDER — LIDOCAINE 40 MG/G
CREAM TOPICAL ONCE
Status: CANCELLED | OUTPATIENT
Start: 2022-09-01 | End: 2022-09-01

## 2022-09-01 RX ORDER — CLOBETASOL PROPIONATE 0.5 MG/G
OINTMENT TOPICAL ONCE
Status: CANCELLED | OUTPATIENT
Start: 2022-09-01 | End: 2022-09-01

## 2022-09-01 RX ORDER — LIDOCAINE HYDROCHLORIDE 40 MG/ML
SOLUTION TOPICAL ONCE
Status: CANCELLED | OUTPATIENT
Start: 2022-09-01 | End: 2022-09-01

## 2022-09-01 RX ORDER — BACITRACIN ZINC AND POLYMYXIN B SULFATE 500; 1000 [USP'U]/G; [USP'U]/G
OINTMENT TOPICAL ONCE
Status: CANCELLED | OUTPATIENT
Start: 2022-09-01 | End: 2022-09-01

## 2022-09-01 RX ORDER — GENTAMICIN SULFATE 1 MG/G
OINTMENT TOPICAL ONCE
Status: CANCELLED | OUTPATIENT
Start: 2022-09-01 | End: 2022-09-01

## 2022-09-01 ASSESSMENT — PAIN SCALES - GENERAL: PAINLEVEL_OUTOF10: 7

## 2022-09-01 ASSESSMENT — PAIN DESCRIPTION - FREQUENCY: FREQUENCY: CONTINUOUS

## 2022-09-01 ASSESSMENT — PAIN - FUNCTIONAL ASSESSMENT: PAIN_FUNCTIONAL_ASSESSMENT: PREVENTS OR INTERFERES SOME ACTIVE ACTIVITIES AND ADLS

## 2022-09-01 ASSESSMENT — PAIN SCALES - WONG BAKER: WONGBAKER_NUMERICALRESPONSE: 0

## 2022-09-01 ASSESSMENT — PAIN DESCRIPTION - DESCRIPTORS: DESCRIPTORS: SHOOTING;STABBING

## 2022-09-01 ASSESSMENT — PAIN DESCRIPTION - ORIENTATION: ORIENTATION: LEFT

## 2022-09-01 ASSESSMENT — PAIN DESCRIPTION - LOCATION: LOCATION: FOOT

## 2022-09-01 ASSESSMENT — PAIN DESCRIPTION - ONSET: ONSET: ON-GOING

## 2022-09-01 NOTE — PROGRESS NOTES
[facility] Initial Visit      Socorro Dominguez  AGE: 52 y.o. GENDER: male  : 1975  EPISODE DATE:  2022   Referred by: Joey Larose MD     Subjective:     CHIEF COMPLAINT: callus and wound left forefoot     HISTORY of PRESENT ILLNESS      Socorro Dominguez is a 52 y.o. male who presents to the [address] [place] [address] for an initial visit for evaluation and treatment of Acute on chronic diabetic  ulcer(s) of  Lt. foot forefoot. The condition is of moderate severity. The ulcer has been present for about 4 weeks. He noted a worsening callus and increased pain for the last few weeks. The underlying cause is thought to be diabetes, neuropathy. The patients care to date has included none - it has not been draining, he denies having pressure offloading shoes. He has been seeing Dr. Kanchan Parish for his feet, but has not been to see him in the last few months. The patient has significant underlying medical conditions as below. Wound Pain Timing/Severity: waxing and waning, moderate  Quality of pain: dull, aching  Severity of pain:  3  10   Modifying Factors: venous stasis, diabetes, shear force, obesity, and anticoagulation therapy  Associated Signs/Symptoms: edema, numbness, and pain    Patient educated on the 6 essential components necessary for wound healing: Circulation, Debridements, Proper Dressings and Topical Wound Products, Infection Control, Edema Control and Offloading. Patient educated on those factors that negatively effect or impact wound healing: smoking, obesity, uncontrolled diabetes, anticoagulant and immunosuppressive regimens, inadequate nutrition, untreated arterial and venous disease if applicable and measures to manage edema.          PAST MEDICAL HISTORY        Diagnosis Date    Abscess     scrotal    Acute renal failure (ARF) ([facility] 2019    Anxiety associated with depression     Anxiety associated with depression     Back pain 2012    Chest pain 2013    Diabetic nephropathy ([address]     Diabetic neuropathy ([address] 12/22/2011    Diabetic ulcer of left midfoot associated with type 2 diabetes mellitus, with fat layer exposed ([address] 07/18/2017    Diverticulosis     C scope + Dr. Mortimer Primer    DM (diabetes mellitus), type 2 ([address] 2002    DR. 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 Mortimer Primer    Necrotizing fasciitis [facility])     Nose fracture 1988    Panic attacks     Pericarditis 2003    Hospitalized with s/p heart cath normal    Peripheral autonomic neuropathy due to diabetes mellitus ([address]     Axonal EMG- NCS, March 2011    Sebaceous cyst 09/01/2011    URI (upper respiratory infection) 02/27/2012    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 type 2 diabetes mellitus, with muscle involvement without evidence of necrosis ([address] 9/21/2021    WD-Wound, surgical, infected, initial encounter 12/08/2017    Wrist fracture 1986       PAST SURGICAL HISTORY    Past Surgical History:   Procedure Laterality Date    [facility]  2003, 2013    Normal (Dr. Liang Matute)    COLONOSCOPY  6/2/2011    Pandiverticulosis, Nonbleeding internal hemorrhoids, Repeat colonoscopy at age 48- Dr. Khushi Ocampo Left 12/21/2021    Tj Matter performed by Dayton Silva DPM at [address] [facility] NONTUNNELED VASCULAR CATHETER  7/25/2022    IR NONTUNNELED VASCULAR CATHETER 7/25/2022 [facility] SPECIAL PROCEDURES    IR TUNNELED CATHETER PLACEMENT GREATER THAN 5 YEARS  7/27/2022    IR TUNNELED CATHETER PLACEMENT GREATER THAN 5 YEARS 7/27/2022 [address]    NOSE SURGERY  1993    \"had reconstruction surgery on nose a year after the other nose surgery\"    OTHER SURGICAL HISTORY Right 05/22/2015    I & D right great toe with partial amputation    OTHER SURGICAL HISTORY  12/04/2017    inc and drainage of abcess    VA DEEP INCIS FOOT BONE INFECTN Left 9/22/2018    LEFT FOOT DEBRIDEMENT INCISION AND DRAINAGE TOP AND BOTTOM performed by Cameron High DPM at [place] [address] 5/15/2021    SCROTAL AND PERINEAL DEBRIDEMENT performed by Itzel Cano MD at [place] [address] 5/16/2021    SCROTAL RE-DEBRIDEMENT  INCISION AND DRAINAGE performed by Itzel Cano MD at [address] [facility] [id number]    sebaceous cyst removal times 4     TOE AMPUTATION      right great toe    TOE AMPUTATION Right 3/23/2019    TOE AMPUTATION RIGHT 5TH TOE performed by Cameron High DPM at [address] Right 8/6/2019    TOE AMPUTATION RIGHT 4TH TOE performed by Cameron High DPM at [facility] ENDOSCOPY  06/2/2011    Mild gastritis with moderatley severe antritis, small hiatal hernia, Dr. Erin Vallecillo 1/12/2019    EGD BIOPSY performed by Shama Agarwal MD at [facility] N/A 7/26/2022    EGD DIAGNOSTIC ONLY performed by Shama Agarwal MD at [address] [facility]    Family History   Problem Relation Age of Onset    Cancer Mother         ?site    Stroke Mother     Bleeding Prob Mother     Diabetes Father     Heart Disease Father     High Blood Pressure Father     Obesity Father     Kidney Disease Father     Diabetes Sister     High Blood Pressure Sister     Mental Illness Sister     Obesity Sister     High Blood Pressure Other     Mental Illness Other         bipolar    Other Son         cyst in ear canal    ADHD Daughter        SOCIAL HISTORY    Social History     Tobacco Use    Smoking status: Some Days     Years: 20.00     Types: Cigarettes    Smokeless tobacco: Never    Tobacco comments:     Smokes when drinking/social   Vaping Use    Vaping Use: Never used   Substance Use Topics    Alcohol use: Not Currently     Comment: occ    Drug use: Yes     Frequency: 7.0 times per week     Types: Marijuana Allison Whiteside     Comment: 12/20/21 @ 2000       ALLERGIES    Allergies   Allergen Reactions    Adhesive Tape Rash    Doxycycline Nausea And Vomiting    Reglan [Metoclopramide] Anxiety       MEDICATIONS    Current Outpatient Medications on File Prior to Encounter   Medication Sig Dispense Refill    metoprolol tartrate (LOPRESSOR) 25 MG tablet Take 0.5 tablets by mouth 2 times daily 30 tablet 0    aspirin 81 MG EC tablet Take 1 tablet by mouth daily 30 tablet 0    clopidogrel (PLAVIX) 75 MG tablet Take 1 tablet by mouth daily 30 tablet 0    oxyCODONE-acetaminophen (PERCOCET) 5-325 MG per tablet Take 1 tablet by mouth every 8 hours as needed for Pain (Leg and feet pain). traZODone (DESYREL) 50 MG tablet Take 1 tablet by mouth nightly as needed for Sleep 30 tablet 0    alogliptin (NESINA) 6.25 MG TABS tablet Take 1 tablet by mouth in the morning. 30 tablet 0    insulin lispro, 1 Unit Dial, (HUMALOG KWIKPEN) 100 UNIT/ML SOPN Inject 20 Units into the skin in the morning and 20 Units at noon and 20 Units in the evening. Inject before meals. 5 pen 0    insulin glargine (LANTUS SOLOSTAR) 100 UNIT/ML injection pen Inject 45 Units into the skin nightly 5 pen 0    ARIPiprazole (ABILIFY) 5 MG tablet Take 1 tablet by mouth at bedtime 30 tablet 0    pantoprazole (PROTONIX) 40 MG tablet Take 1 tablet by mouth in the morning and 1 tablet in the evening. Take before meals. 30 tablet 0    sucralfate (CARAFATE) 1 GM tablet Take 1 tablet by mouth in the morning and 1 tablet in the evening. Take before meals. 120 tablet 3    calcitRIOL (ROCALTROL) 0.25 MCG capsule Take 1 capsule by mouth in the morning.  30 capsule 0    [DISCONTINUED] furosemide (LASIX) 40 MG tablet Take 1 tablet by mouth daily 30 tablet 1    [DISCONTINUED] insulin aspart (NOVOLOG) 100 UNIT/ML injection vial Inject 35 Units into the skin 3 times daily (before meals)      atorvastatin (LIPITOR) 40 MG tablet Take 1 tablet by mouth nightly 30 tablet 3    [DISCONTINUED] amLODIPine (NORVASC) 10 MG tablet Take 1 tablet by mouth daily 30 tablet 3    [DISCONTINUED] chlorthalidone (HYGROTON) 25 MG tablet Take 1 tablet by mouth daily 30 tablet 3    sertraline (ZOLOFT) 50 MG tablet Take 2 tablets by mouth daily 30 tablet 3    [DISCONTINUED] linagliptin (TRADJENTA) 5 MG tablet Take 1 tablet by mouth daily 30 tablet 5    ondansetron (ZOFRAN) 4 MG tablet Take 1 tablet by mouth every 8 hours as needed for Nausea or Vomiting 20 tablet 0    Lancets MISC 1 each by Does not apply route daily 100 each 3    glucose monitoring kit (FREESTYLE) monitoring kit 1 each by Does not apply route once for 1 dose. 1 kit 0     No current facility-administered medications on file prior to encounter.        PROBLEM LIST    Patient Active Problem List   Diagnosis    Hiatal hernia    Diabetes mellitus type 2, improved controlled    Diabetic neuropathy (HCC)    Panic attack    Anxiety associated with depression    Neck pain    DANIELA (obstructive sleep apnea)    Urinary frequency    Bilateral carpal tunnel syndrome- left worse than right    Hyperlipidemia with target LDL less than 100    Essential hypertension    Bronchitis    Osteomyelitis (HCC)    Abscess    Periumbilical hernia    Sepsis (HCC)    Cellulitis of left foot    Constipation    Intractable nausea and vomiting    Uncontrolled type 2 diabetes mellitus (HCC)    Nausea and vomiting    Diabetic foot infection ([address]    Acute renal failure (ARF) (HCC)    Cellulitis    Cellulitis of left arm    Cellulitis, scrotum    Acute epididymitis    Irene gangrene    Recurrent major depressive disorder, in full remission ([address]    Generalized anxiety disorder    Morbid obesity with body mass index (BMI) of 40.0 to 44.9 in [facility])    Necrotizing fasciitis ([address]    Syncope    Right groin wound    Ulcer of right groin with fat layer exposed ([address] 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 type 2 diabetes mellitus, with muscle involvement without evidence of necrosis (HCC)    Nephrotic syndrome    Generalized edema    Stage 3b chronic kidney disease ([address]    Diabetic nephropathy associated with type 2 diabetes mellitus (HCC)    Hypoalbuminemia    Chronic kidney disease, stage IV (severe) (HCC)    FH: CAD (coronary artery disease)    ASCVD (arteriosclerotic cardiovascular disease)    Other proteinuria    Chest pain    Surgical wound dehiscence    CARMEN (acute kidney injury) (HCC)    Anemia    Chest tightness    NSTEMI (non-ST elevated myocardial infarction) (HCC)    WD - Ulcer of forefoot due to type 2 diabetes mellitus (HCC)       REVIEW OF SYSTEMS    A comprehensive review of systems was negative except for: Integument/breast: positive for skin lesion(s)  Hematologic/lymphatic: positive for easy bruising  Neurological: positive for neuropathy      Objective:      Pulse 81   Temp 98.5 °F (36.9 °C)   Resp 23     PHYSICAL EXAM    General Appearance:   Alert, cooperative, no distress    Head:   Normocephalic, without obvious abnormality, atraumatic    Eyes:   PERRL, conjunctiva/corneas clear    Ears:   Normal external appearance, hearing grossly intact    Nose:  Nares normal, mucosa normal, no drainage    Throat:  Lips, mucosa, and tongue normal    Neck:  Supple, trachea midline    Respiratory:   Equal chest rise, respirations unlabored, no wheezing   Cardiovascular:   Regular rate and rhythm    Abdomen, GI:   Soft, non-tender, no rebound or guarding    Musculoskeletal:  Atraumatic, no cyanosis, mild chronic appearing edema    Neurologic:  Nonfocal, bilateral lower extremity neuropathy   Psychiatric: Oriented x3, grossly appropriate judgement and insight   Dermatologic exam: Visual inspection of the periwound reveals the skin to be coarse  Wound exam: see wound description below in procedure note      Assessment:       Chris Watt  appears to have a non-healing wound of the left forefoot. The etiology of the wound is felt to be diabetic. There are multiple complicating factors including edema, diabetes, shear force, obesity, anticoagulation therapy, and neuropathy . A comprehensive wound management program would be helpful to heal this wound. Assessments completed include fall risk and nutritional, functional,and psychological status. At this time appropriate care would include: periodic debridement and wound care as below. Problem List Items Addressed This Visit          Endocrine    Diabetic neuropathy (HCC) (Chronic)    WD - Ulcer of forefoot due to type 2 diabetes mellitus ([facility] [address] - Primary         Procedure Note    Indications:  Based on my examination of this patient's wound(s) today, sharp excision into necrotic epidermis, dermis, and subcutaneous tissue is required to promote healing and evaluate the extent of previous healing. Performed by: Paco Styles MD    Consent obtained: Yes    Time out taken:  Yes    Pain Control: topical lidocaine       Debridement:Excisional Debridement    Using curette, #15 blade scalpel, and forceps the wound(s) was/were sharply debrided down through and including the removal of epidermis, dermis, and subcutaneous tissue.         Devitalized Tissue Debrided:  fibrin, necrotic/eschar, and callus    Pre Debridement Measurements:  Are located in the Wound Documentation Flow Sheet    All active wounds listed below with today's date are evaluated  Wound(s)    debrided this date include # : 1     Post  Debridement Measurements:  Wound 12/08/17 #3 abdoment (onset 3 days ago) SURGICAL (Active)   Number of days: 1728       Wound 12/08/17 #4 Lt plantar (onset 6 months ago) DIABETIC ALCALA 1 (Active)   Number of days: 1728       Wound 05/18/18 # 5 LEFT PLANTAR (ONSET 1 YEAR AGO) DM WAG 1 (Active)   Number of days: 1453       Wound 09/17/18 Left dorsal foot and 4th toe amp site 9/19/18 (Active)   Number of days: 1444       Incision 05/22/15 Foot Right (Active)   Number of days: 7209       Incision 12/04/17 Abdomen Mid (Active)   Number of days: 4037       Wound 01/11/19 left plantar foot wound (Active)   Number of days: 1329       Wound 03/21/19 Toe (Comment  which one) Anterior;Right (Active)   Number of days: 1260       Wound 03/21/19 Foot Left;Posterior hard callus area (Active)   Number of days: 1260       Wound 03/24/21 Left;Plantar (Active)   Number of days: 526       Wound 05/13/21 Left;Plantar (Active)   Number of days: 475       Wound 05/17/21 Groin Right scrotum extends to lower buttock (Active)   Number of days: 472       Wound 09/01/22 #1 Left Plantar (Active)   Wound Image   09/01/22 1421   Wound Etiology Diabetic 09/01/22 1457   Dressing Status New dressing applied 09/01/22 1457   Wound Cleansed Wound cleanser 09/01/22 1421   Offloading for Diabetic Foot Ulcers Post op shoe 09/01/22 1457   Wound Length (cm) 2.5 cm 09/01/22 1421   Wound Width (cm) 2.2 cm 09/01/22 1421   Wound Depth (cm) 0.1 cm 09/01/22 1421   Wound Surface Area (cm^2) 5.5 cm^2 09/01/22 1421   Wound Volume (cm^3) 0.55 cm^3 09/01/22 1421   Post-Procedure Length (cm) 2.5 cm 09/01/22 1431   Post-Procedure Width (cm) 2.2 cm 09/01/22 1431   Post-Procedure Depth (cm) 0.1 cm 09/01/22 1431   Post-Procedure Surface Area (cm^2) 5.5 cm^2 09/01/22 1431   Post-Procedure Volume (cm^3) 0.55 cm^3 09/01/22 1431   Distance Tunneling (cm) 0 cm 09/01/22 1421   Tunneling Position ___ O'Clock 0 09/01/22 1421   Undermining Starts ___ O'Clock 0 09/01/22 1421   Undermining Ends___ O'Clock 0 09/01/22 1421   Undermining Maxium Distance (cm) 0 09/01/22 1421   Wound Assessment Pink/red 09/01/22 1421   Drainage Amount Small 09/01/22 1421   Drainage Description Yellow 09/01/22 1421   Odor None 09/01/22 1421   Shazia-wound Assessment Hyperkeratosis (callous) 09/01/22 1421   Margins Defined edges 09/01/22 1421   Number of days: 0       Percent of Wound(s) Debrided: 100%    Total Area  Debrided:  5.5 sq cm     Bleeding:  Minimal    Hemostasis Achieved:  by pressure    Procedural Pain:  0  / 10     Post Procedural Pain:  0 / 10     Response to treatment:  Well tolerated by patient. Plan:   Pressure offload the left forefoot - shoe given  Follow up with Dr. Donna Sheikh for possible treatment of neuropathic/diabetic foot  Anasept/stimulen, foam for now  Follow up in 1 week    Discharge Instructions         [address]    Wound cleansing:     Do not scrub or use excessive force. Wash hands with soap and water before and after dressing changes. Prior to applying a clean dressing, cleanse wound with normal saline,               wound cleanser, or mild soap and water. Ask the physician or nurse before getting the wound(s) wet in a shower    Daily Wound management:   Keep weight off wounds and reposition every 2 hours. Avoid standing for long periods of time. Apply wraps/stockings in AM and remove at bedtime. If swelling is present, elevate legs to the level of the heart or above for              30 minutes 4-5 times a day and/or when sitting. When taking antibiotics take entire prescription as ordered by              physician do not stop taking until medicine is all gone. Grafts:                  Wound Care Notes:  Rx:    Cultures:                      Orders for this week:22       Left plantar wound :Wash with soap and water. Pat dry. Apply  anasept gel and stimulen    to wound bed  Cover with ca algiante and foam  Wrap with conform  Secure With tape   Change  daily  Give post op shoe       Follow Up Instructions: At the [address] [place] [address] in 1 week   Primary Wound Care Provider: Dr Leticia Hinson   Call  for any questions or concerns.   Central [phone] for imaging lab work          Treatment Note Wound 22 #1 Left Plantar-Dressing/Treatment:  (anasept stimulen,ca ag,foam,conform tape)    Written Patient Dismissal Instructions Given          Electronically signed by Ayaka An MD on 9/1/2022 at 3:29 PM

## 2022-09-01 NOTE — DISCHARGE INSTRUCTIONS
PHYSICIAN ORDERS AND DISCHARGE INSTRUCTIONS    Wound cleansing:     Do not scrub or use excessive force. Wash hands with soap and water before and after dressing changes. Prior to applying a clean dressing, cleanse wound with normal saline,               wound cleanser, or mild soap and water. Ask the physician or nurse before getting the wound(s) wet in a shower    Daily Wound management:   Keep weight off wounds and reposition every 2 hours. Avoid standing for long periods of time. Apply wraps/stockings in AM and remove at bedtime. If swelling is present, elevate legs to the level of the heart or above for              30 minutes 4-5 times a day and/or when sitting. When taking antibiotics take entire prescription as ordered by              physician do not stop taking until medicine is all gone. Grafts:                  Wound Care Notes:  Rx:    Cultures:                      Orders for this week:22       Left plantar wound :Wash with soap and water. Pat dry. Apply  anasept gel and stimulen    to wound bed  Cover with ca algiante and foam  Wrap with conform  Secure With tape   Change  daily  Give post op shoe       Follow Up Instructions: At the [facility] in 1 week   Primary Wound Care Provider: Dr Kalin Fontanez   Call  for any questions or concerns.   Central [phone] for imaging lab work

## 2022-09-05 ENCOUNTER — HOSPITAL ENCOUNTER (OUTPATIENT)
Age: 47
Setting detail: SPECIMEN
Discharge: HOME | End: 2022-09-05

## 2022-09-05 PROCEDURE — 80048 BASIC METABOLIC PNL TOTAL CA: CPT

## 2022-09-06 ENCOUNTER — OFFICE VISIT (OUTPATIENT)
Dept: CARDIOLOGY CLINIC | Age: 47
End: 2022-09-06
Payer: MEDICARE

## 2022-09-06 VITALS
HEART RATE: 85 BPM | DIASTOLIC BLOOD PRESSURE: 86 MMHG | SYSTOLIC BLOOD PRESSURE: 124 MMHG | BODY MASS INDEX: 44.58 KG/M2 | HEIGHT: 69 IN | WEIGHT: 301 LBS

## 2022-09-06 DIAGNOSIS — I10 ESSENTIAL HYPERTENSION: Primary | ICD-10-CM

## 2022-09-06 PROCEDURE — G8427 DOCREV CUR MEDS BY ELIG CLIN: HCPCS | Performed by: INTERNAL MEDICINE

## 2022-09-06 PROCEDURE — G8417 CALC BMI ABV UP PARAM F/U: HCPCS | Performed by: INTERNAL MEDICINE

## 2022-09-06 PROCEDURE — 1111F DSCHRG MED/CURRENT MED MERGE: CPT | Performed by: INTERNAL MEDICINE

## 2022-09-06 PROCEDURE — 4004F PT TOBACCO SCREEN RCVD TLK: CPT | Performed by: INTERNAL MEDICINE

## 2022-09-06 PROCEDURE — 99214 OFFICE O/P EST MOD 30 MIN: CPT | Performed by: INTERNAL MEDICINE

## 2022-09-06 PROCEDURE — 93000 ELECTROCARDIOGRAM COMPLETE: CPT | Performed by: INTERNAL MEDICINE

## 2022-09-06 RX ORDER — RANOLAZINE 500 MG/1
500 TABLET, EXTENDED RELEASE ORAL 2 TIMES DAILY
Qty: 60 TABLET | Refills: 3 | Status: SHIPPED | OUTPATIENT
Start: 2022-09-06

## 2022-09-06 NOTE — PROGRESS NOTES
.Patient did not bring medication bottles or list.     Vein \"LEG PROBLEM Questionnaire\"  Do you have prominent leg veins? No   Do you have any skin discoloration? Yes  Do you have any healed or active sores? Yes  Do you have swelling of the legs? Yes  Do you have a family history of varicose veins? Yes  Does your profession involve pro-longed        standing or heavy lifting? No  7. Have you been fighting overweight problems? Yes  8. Do you have restless legs? Yes  9. Do you have any night time cramps? Yes  10. Do you have any of the following in your legs: All of them I  11. If Yes - Have they worn compression stockings Yes  12.  If they have worn compression stockings 3 Months

## 2022-09-06 NOTE — PROGRESS NOTES
CARDIOLOGY NOTE      9/6/2022    RE: Linda Salcido  (1975)                               TO:  Dr. Randi Black MD            Darrell Benson is a 52 y.o. male who was seen today for management of  cp                                    HPI:                   patient has history of CAD history of LAD PCI, extreme disease on hemodialysis, hypertension, hyperlipidemia was recently admitted to the hospital had PCI of the circumflex performed given that he was having chest pain had an abnormal stress test now he is here for follow-up he still has symptoms chest pain also complains restless legs as well    Linda Salcido has the following history recorded in care path:  Patient Active Problem List    Diagnosis Date Noted    WD - Ulcer of forefoot due to type 2 diabetes mellitus ([address] 09/01/2022    NSTEMI (non-ST elevated myocardial infarction) ([address] 08/24/2022    Chest tightness 08/22/2022    CARMEN (acute kidney injury) ([address] 07/25/2022    Anemia 07/25/2022    Recurrent major depressive disorder, in full remission ([address] 05/18/2021    Generalized anxiety disorder 05/18/2021    Surgical wound dehiscence 02/08/2022    Chest pain 12/21/2021    Other proteinuria     FH: CAD (coronary artery disease) 09/29/2021    ASCVD (arteriosclerotic cardiovascular disease) 09/29/2021    Chronic kidney disease, stage IV (severe) ([address] 09/28/2021    Nephrotic syndrome 09/22/2021    Generalized edema 09/22/2021    Stage 3b chronic kidney disease ([address] 09/22/2021    Diabetic nephropathy associated with type 2 diabetes mellitus ([address] 09/22/2021    Hypoalbuminemia 09/22/2021    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 type 2 diabetes mellitus, with muscle involvement without evidence of necrosis ([address] 09/21/2021    Right groin wound 06/09/2021    Ulcer of right groin with fat layer exposed ([address]     Syncope 06/05/2021    Necrotizing fasciitis ([address] 05/24/2021    Morbid obesity with body mass index (BMI) of 40.0 to 44.9 in adult [facility]) 05/21/2021    Irene gangrene     Cellulitis, scrotum 05/13/2021    Acute epididymitis     Cellulitis of left arm 04/03/2021    Cellulitis 03/23/2021    Acute renal failure (ARF) ([facility] 08/04/2019    Diabetic foot infection ([facility] 03/21/2019    Intractable nausea and vomiting 01/11/2019    Uncontrolled type 2 diabetes mellitus ([facility] 01/11/2019    Nausea and vomiting 01/11/2019    Constipation 10/07/2018    Cellulitis of left foot     Sepsis ([facility] 76/82/4744    Periumbilical hernia     Abscess 12/03/2017    Osteomyelitis ([facility] 05/22/2015    Bronchitis 10/15/2013    Hyperlipidemia with target LDL less than 100 07/15/2013    Essential hypertension 07/15/2013    Bilateral carpal tunnel syndrome- left worse than right 06/24/2013    DANIELA (obstructive sleep apnea) 06/20/2013    Urinary frequency 06/20/2013    Neck pain 05/16/2013    Anxiety associated with depression     Panic attack 02/01/2012    Diabetic neuropathy ([facility] 12/22/2011    Hiatal hernia 02/04/2011    Diabetes mellitus type 2, improved controlled 02/04/2011     Current Outpatient Medications   Medication Sig Dispense Refill    ranolazine (RANEXA) 500 MG extended release tablet Take 1 tablet by mouth 2 times daily 60 tablet 3    metoprolol tartrate (LOPRESSOR) 25 MG tablet Take 0.5 tablets by mouth 2 times daily 30 tablet 0    aspirin 81 MG EC tablet Take 1 tablet by mouth daily 30 tablet 0    clopidogrel (PLAVIX) 75 MG tablet Take 1 tablet by mouth daily 30 tablet 0    oxyCODONE-acetaminophen (PERCOCET) 5-325 MG per tablet Take 1 tablet by mouth every 8 hours as needed for Pain (Leg and feet pain). traZODone (DESYREL) 50 MG tablet Take 1 tablet by mouth nightly as needed for Sleep 30 tablet 0    alogliptin (NESINA) 6.25 MG TABS tablet Take 1 tablet by mouth in the morning.  30 tablet 0    insulin lispro, 1 Unit Dial, (HUMALOG KWIKPEN) 100 UNIT/ML SOPN Inject 20 Units into the skin in the morning and 20 Units at noon and 20 Units in the evening. Inject before meals. 5 pen 0    insulin glargine (LANTUS SOLOSTAR) 100 UNIT/ML injection pen Inject 45 Units into the skin nightly 5 pen 0    ARIPiprazole (ABILIFY) 5 MG tablet Take 1 tablet by mouth at bedtime 30 tablet 0    pantoprazole (PROTONIX) 40 MG tablet Take 1 tablet by mouth in the morning and 1 tablet in the evening. Take before meals. 30 tablet 0    sucralfate (CARAFATE) 1 GM tablet Take 1 tablet by mouth in the morning and 1 tablet in the evening. Take before meals. 120 tablet 3    calcitRIOL (ROCALTROL) 0.25 MCG capsule Take 1 capsule by mouth in the morning. 30 capsule 0    atorvastatin (LIPITOR) 40 MG tablet Take 1 tablet by mouth nightly 30 tablet 3    sertraline (ZOLOFT) 50 MG tablet Take 2 tablets by mouth daily 30 tablet 3    ondansetron (ZOFRAN) 4 MG tablet Take 1 tablet by mouth every 8 hours as needed for Nausea or Vomiting 20 tablet 0    Lancets MISC 1 each by Does not apply route daily 100 each 3    glucose monitoring kit (FREESTYLE) monitoring kit 1 each by Does not apply route once for 1 dose. 1 kit 0     No current facility-administered medications for this visit. Allergies: Adhesive tape, Doxycycline, and Reglan [metoclopramide]  Past Medical History:   Diagnosis Date    Abscess 2010    scrotal    Acute renal failure (ARF) ([facility] 08/04/2019    Anxiety associated with depression     Anxiety associated with depression     Back pain 07/02/2012    Chest pain 05/01/2013    Diabetic nephropathy ([address]     Diabetic neuropathy ([address] 12/22/2011    Diabetic ulcer of left midfoot associated with type 2 diabetes mellitus, with fat layer exposed ([address] 07/18/2017    Diverticulosis     C scope + Dr. Aldon Schilder    DM (diabetes mellitus), type 2 ([address] 2002    DR. 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 Razia Fried    Necrotizing fasciitis [facility])     Nose fracture 1988    Panic attacks     Pericarditis 2003    Hospitalized with s/p heart cath normal    Peripheral autonomic neuropathy due to diabetes mellitus ([facility]     Axonal EMG- NCS, March 2011    Sebaceous cyst 09/01/2011    URI (upper respiratory infection) 02/27/2012    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 type 2 diabetes mellitus, with muscle involvement without evidence of necrosis ([facility] 9/21/2021    WD-Wound, surgical, infected, initial encounter 12/08/2017    Wrist fracture 1986     Past Surgical History:   Procedure Laterality Date    [address] [facility] [address]  2003, 2013    Normal (Dr. Merribeth Sacks)    COLONOSCOPY  6/2/2011    Pandiverticulosis, Nonbleeding internal hemorrhoids, Repeat colonoscopy at age 48- Dr. Stu Valdivia Left 12/21/2021    Yasmeene See performed by Shahid Frank DPM at [address] [facility] NONTUNNELED VASCULAR CATHETER  7/25/2022    IR NONTUNNELED VASCULAR CATHETER 7/25/2022 [facility] SPECIAL PROCEDURES    IR TUNNELED CATHETER PLACEMENT GREATER THAN 5 YEARS  7/27/2022    IR TUNNELED CATHETER PLACEMENT GREATER THAN 5 YEARS 7/27/2022 [address] [place]    NOSE SURGERY  1993    \"had reconstruction surgery on nose a year after the other nose surgery\"    OTHER SURGICAL HISTORY Right 05/22/2015    I & D right great toe with partial amputation    OTHER SURGICAL HISTORY  12/04/2017    inc and drainage of abcess    VT DEEP INCIS FOOT BONE INFECTN Left 9/22/2018    LEFT FOOT DEBRIDEMENT INCISION AND DRAINAGE TOP AND BOTTOM performed by Edi Kelly DPM at [place] [address] 5/15/2021    SCROTAL AND PERINEAL DEBRIDEMENT performed by Judith Oconnor MD at [place] [address] 5/16/2021    SCROTAL RE-DEBRIDEMENT  INCISION AND DRAINAGE performed by Judith Oconnor MD at [address] [place] [address] [phone]    sebaceous cyst removal times 4     TOE AMPUTATION      right great toe    TOE AMPUTATION Right 3/23/2019    TOE AMPUTATION RIGHT 5TH TOE performed by Edel Liriano DPM at [address] Right 2019    TOE AMPUTATION RIGHT 4TH TOE performed by Edel Liriano DPM at [address] ENDOSCOPY  2011    Mild gastritis with moderatley severe antritis, small hiatal hernia, Dr. Karl Mendez 2019    EGD BIOPSY performed by Analilia Doshi MD at [facility] N/A 2022    EGD DIAGNOSTIC ONLY performed by Analilia Doshi MD at [facility]      As reviewed   Family History   Problem Relation Age of Onset    Cancer Mother         ?site    Stroke Mother     Bleeding Prob Mother     Diabetes Father     Heart Disease Father     High Blood Pressure Father     Obesity Father     Kidney Disease Father     Diabetes Sister     High Blood Pressure Sister     Mental Illness Sister     Obesity Sister     High Blood Pressure Other     Mental Illness Other         bipolar    Other Son         cyst in ear canal    ADHD Daughter      Social History     Tobacco Use    Smoking status: Former     Years: 20.00     Types: Cigarettes     Quit date: 2022     Years since quittin.1    Smokeless tobacco: Never    Tobacco comments:     Smokes when drinking/social   Substance Use Topics    Alcohol use: Not Currently     Comment: occ      Review of Systems:    Constitutional: Negative for diaphoresis and fatigue  Psychological:Negative for anxiety or depression  HENT: Negative for headaches, nasal congestion, sinus pain or vertigo  Eyes: Negative for visual disturbance.    Endocrine: Negative for polydipsia/polyuria  Respiratory: Negative for shortness of breath  Cardiovascular:  cpGastrointestinal: Negative for abdominal pain or heartburn  Genito-Urinary: Negative for urinary frequency/urgency  Musculoskeletal: Negative for muscle pain, muscular weakness, negative for pain in arm and leg or swelling in foot and leg  Neurological: Negative for dizziness, headaches, memory loss, numbness/tingling, visual changes, syncope  Dermatological: Negative for rash    Objective:    Vitals:    09/06/22 1631   BP: 124/86   Pulse: 85   Weight: (!) 301 lb (136.5 kg)   Height: 5' 9\" (1.753 m)     /86   Pulse 85   Ht 5' 9\" (1.753 m)   Wt (!) 301 lb (136.5 kg)   BMI 44.45 kg/m²     No flowsheet data found. Wt Readings from Last 3 Encounters:   09/06/22 (!) 301 lb (136.5 kg)   08/25/22 (!) 300 lb 12.8 oz (136.4 kg)   08/03/22 (!) 304 lb 3.8 oz (138 kg)     Body mass index is 44.45 kg/m². GENERAL - Alert, oriented, pleasant, in no apparent distress. EYES: No jaundice, no conjunctival pallor. SKIN: It is warm & dry. No rashes. No Echhymosis    HEENT - No clinically significant abnormalities seen. Neck - Supple. No jugular venous distention noted. No carotid bruits. Cardiovascular - Normal S1 and S2 without obvious murmur or gallop. Extremities - No cyanosis, clubbing, or significant edema. Pulmonary - No respiratory distress. No wheezes or rales. Abdomen - No masses, tenderness, or organomegaly. Musculoskeletal - No significant edema. No joint deformities. No muscle wasting. Neurologic - Cranial nerves II through XII are grossly intact. There were no gross focal neurologic abnormalities.     Lab Review   Lab Results   Component Value Date/Time    CKTOTAL 73 08/04/2019 08:44 PM    CKMB 2.8 05/01/2013 12:12 PM    TROPONINT 0.115 08/23/2022 07:12 AM     BNP:    Lab Results   Component Value Date/Time    BNP 10 05/01/2013 12:12 PM     PT/INR:    Lab Results   Component Value Date    INR 1.08 08/23/2022     Lab Results   Component Value Date    LABA1C 7.1 (H) 08/23/2022    LABA1C 10.0 (H) 07/25/2022     Lab Results   Component Value Date    WBC 7.9 08/25/2022    HCT 26.7 (L) 08/25/2022    MCV 92.1 08/25/2022    PLT 233 08/25/2022     Lab Results   Component Value Date    CHOL 145 05/13/2021    TRIG 188 (H) 05/13/2021    HDL 29 (L) 05/13/2021    LDLCALC 92 07/24/2017    LDLDIRECT 87 05/13/2021     Lab Results   Component Value Date    ALT 11 08/01/2022    AST 11 (L) 08/01/2022     BMP:    Lab Results   Component Value Date/Time     08/25/2022 08:35 AM    K 4.4 08/25/2022 08:35 AM     08/25/2022 08:35 AM    CO2 24 08/25/2022 08:35 AM    BUN 45 08/25/2022 08:35 AM    CREATININE 5.5 08/25/2022 08:35 AM     CMP:   Lab Results   Component Value Date/Time     08/25/2022 08:35 AM    K 4.4 08/25/2022 08:35 AM     08/25/2022 08:35 AM    CO2 24 08/25/2022 08:35 AM    BUN 45 08/25/2022 08:35 AM    PROT 5.4 08/01/2022 06:50 AM    PROT 7.1 04/11/2012 09:31 AM     TSH:    Lab Results   Component Value Date/Time    TSHHS 3.860 07/25/2022 06:50 AM           Assessment & Plan:               -     CORONARY ARTERY DISEASE:  symptomatic     All available  tests in chart reviewed. Management discussed . Testing ordered  no           On aspirin and Plavix compliant we will continue I also added Ranexa to his drug regimen given that he is having chest pain                      -  LIPID MANAGEMENT:  Importance of lipid levels discussed with patient   and patient was given dietary advice. NCEP- ATP III guidelines reviewed with patient. -   Changes  in medicines made: No     Patient is on Lipitor 40 mg daily we will check the lipid profile                           -   DIABETES MELLITUS: Available pertinent lab data reviewed   and  patient was given dietary advice . Advised to check blood glucose level on a regular basis. -   Changes  in medicines made: No        Patient is on insulin compliant we will check the hemoglobin A1c level            Mortality from the morbid obesity is very high: Body mass index is 44.45 kg/m².           Hector Xie MD    [facility] [place]

## 2022-09-06 NOTE — PATIENT INSTRUCTIONS
Please be informed that if you contact our office outside of normal business hours the physician on call cannot help with any scheduling or rescheduling issues, procedure instruction questions or any type of medication issue. We advise you for any urgent/emergency that you go to the nearest emergency room! PLEASE CALL OUR OFFICE DURING NORMAL BUSINESS HOURS    Monday - Friday   8 am to 5 pm    [place]: [phone]    [place]:  [phone]  **It is YOUR responsibilty to bring medication bottles and/or updated medication list to [address] [place] [address]. This will allow us to better serve you and all your healthcare needs**  [facility] Locations - No appointment necessary. Sites open Monday to Friday. Call your preferred location for test preparation, business hours and other information you need. [employer] accepts [employer]. [place] [facility] [address] [place], [address] [facility]  Phone: [phone] [facility] [address] [facility]  [address].   [place], [address]  Phone: [phone]

## 2022-09-07 LAB
ANION GAP SERPL CALCULATED.3IONS-SCNC: 15 MMOL/L (ref 4–16)
BUN BLDV-MCNC: 79 MG/DL (ref 6–23)
CALCIUM SERPL-MCNC: 7.9 MG/DL (ref 8.3–10.6)
CHLORIDE BLD-SCNC: 99 MMOL/L (ref 99–110)
CO2: 25 MMOL/L (ref 21–32)
CREAT SERPL-MCNC: 9.4 MG/DL (ref 0.9–1.3)
GFR AFRICAN AMERICAN: 7 ML/MIN/1.73M2
GFR NON-AFRICAN AMERICAN: 6 ML/MIN/1.73M2
GLUCOSE BLD-MCNC: 139 MG/DL (ref 70–99)
POTASSIUM SERPL-SCNC: 4.5 MMOL/L (ref 3.5–5.1)
SODIUM BLD-SCNC: 139 MMOL/L (ref 135–145)

## 2022-09-08 ENCOUNTER — HOSPITAL ENCOUNTER (OUTPATIENT)
Dept: WOUND CARE | Age: 47
Discharge: HOME | End: 2022-09-08
Payer: MEDICARE

## 2022-09-08 VITALS
SYSTOLIC BLOOD PRESSURE: 157 MMHG | RESPIRATION RATE: 18 BRPM | TEMPERATURE: 98.7 F | DIASTOLIC BLOOD PRESSURE: 78 MMHG | HEART RATE: 93 BPM

## 2022-09-08 DIAGNOSIS — E11.42 DIABETIC POLYNEUROPATHY ASSOCIATED WITH TYPE 2 DIABETES MELLITUS (HCC): ICD-10-CM

## 2022-09-08 DIAGNOSIS — L97.509 ULCER OF FOREFOOT DUE TO TYPE 2 DIABETES MELLITUS (HCC): Primary | ICD-10-CM

## 2022-09-08 DIAGNOSIS — E11.621 DIABETIC ULCER OF LEFT MIDFOOT ASSOCIATED WITH TYPE 2 DIABETES MELLITUS, WITH MUSCLE INVOLVEMENT WITHOUT EVIDENCE OF NECROSIS (HCC): ICD-10-CM

## 2022-09-08 DIAGNOSIS — E11.621 ULCER OF FOREFOOT DUE TO TYPE 2 DIABETES MELLITUS (HCC): Primary | ICD-10-CM

## 2022-09-08 DIAGNOSIS — L97.425 DIABETIC ULCER OF LEFT MIDFOOT ASSOCIATED WITH TYPE 2 DIABETES MELLITUS, WITH MUSCLE INVOLVEMENT WITHOUT EVIDENCE OF NECROSIS (HCC): ICD-10-CM

## 2022-09-08 PROCEDURE — 11042 DBRDMT SUBQ TIS 1ST 20SQCM/<: CPT | Performed by: SURGERY

## 2022-09-08 PROCEDURE — 11042 DBRDMT SUBQ TIS 1ST 20SQCM/<: CPT

## 2022-09-08 RX ORDER — LIDOCAINE 50 MG/G
OINTMENT TOPICAL ONCE
Status: CANCELLED | OUTPATIENT
Start: 2022-09-08 | End: 2022-09-08

## 2022-09-08 RX ORDER — BETAMETHASONE DIPROPIONATE 0.05 %
OINTMENT (GRAM) TOPICAL ONCE
Status: CANCELLED | OUTPATIENT
Start: 2022-09-08 | End: 2022-09-08

## 2022-09-08 RX ORDER — BACITRACIN ZINC AND POLYMYXIN B SULFATE 500; 1000 [USP'U]/G; [USP'U]/G
OINTMENT TOPICAL ONCE
Status: CANCELLED | OUTPATIENT
Start: 2022-09-08 | End: 2022-09-08

## 2022-09-08 RX ORDER — GENTAMICIN SULFATE 1 MG/G
OINTMENT TOPICAL ONCE
Status: CANCELLED | OUTPATIENT
Start: 2022-09-08 | End: 2022-09-08

## 2022-09-08 RX ORDER — LIDOCAINE HYDROCHLORIDE 40 MG/ML
SOLUTION TOPICAL ONCE
Status: CANCELLED | OUTPATIENT
Start: 2022-09-08 | End: 2022-09-08

## 2022-09-08 RX ORDER — GINSENG 100 MG
CAPSULE ORAL ONCE
Status: CANCELLED | OUTPATIENT
Start: 2022-09-08 | End: 2022-09-08

## 2022-09-08 RX ORDER — BACITRACIN, NEOMYCIN, POLYMYXIN B 400; 3.5; 5 [USP'U]/G; MG/G; [USP'U]/G
OINTMENT TOPICAL ONCE
Status: CANCELLED | OUTPATIENT
Start: 2022-09-08 | End: 2022-09-08

## 2022-09-08 RX ORDER — LIDOCAINE 40 MG/G
CREAM TOPICAL ONCE
Status: CANCELLED | OUTPATIENT
Start: 2022-09-08 | End: 2022-09-08

## 2022-09-08 RX ORDER — CLOBETASOL PROPIONATE 0.5 MG/G
OINTMENT TOPICAL ONCE
Status: CANCELLED | OUTPATIENT
Start: 2022-09-08 | End: 2022-09-08

## 2022-09-08 ASSESSMENT — PAIN SCALES - GENERAL: PAINLEVEL_OUTOF10: 0

## 2022-09-08 ASSESSMENT — PAIN SCALES - WONG BAKER: WONGBAKER_NUMERICALRESPONSE: 0

## 2022-09-08 NOTE — DISCHARGE INSTRUCTIONS
PHYSICIAN ORDERS AND DISCHARGE INSTRUCTIONS     Wound cleansing:              Do not scrub or use excessive force. Wash hands with soap and water before and after dressing changes. Prior to applying a clean dressing, cleanse wound with normal saline,               wound cleanser, or mild soap and water. Ask the physician or nurse before getting the wound(s) wet in a shower     Daily Wound management:             Keep weight off wounds and reposition every 2 hours. Avoid standing for long periods of time. Apply wraps/stockings in AM and remove at bedtime. If swelling is present, elevate legs to the level of the heart or above for              30 minutes 4-5 times a day and/or when sitting. When taking antibiotics take entire prescription as ordered by              physician do not stop taking until medicine is all gone. Grafts:                   Wound Care Notes:  Rx:    Cultures:                                          Orders for this week: 22                 Left plantar wound: Wash with soap and water, pat dry. Apply anasept gel and stimulen to wound bed. Cover with ca alginate and foam  Wrap with conform  Secure with tape   Change daily    Give forefoot offloading shoe today in clinic 22 for left foot        Follow Up Instructions: At the [address] in 1 week   Primary Wound Care Provider: Dr Anival Campbell   Call  for any questions or concerns.   Central [phone] for imaging lab work

## 2022-09-12 ENCOUNTER — APPOINTMENT (OUTPATIENT)
Dept: GENERAL RADIOLOGY | Age: 47
End: 2022-09-12
Payer: MEDICARE

## 2022-09-12 ENCOUNTER — HOSPITAL ENCOUNTER (OUTPATIENT)
Age: 47
Setting detail: OBSERVATION
Discharge: HOME | End: 2022-09-14
Attending: EMERGENCY MEDICINE | Admitting: INTERNAL MEDICINE
Payer: MEDICARE

## 2022-09-12 ENCOUNTER — HOSPITAL ENCOUNTER (OUTPATIENT)
Age: 47
Setting detail: SPECIMEN
Discharge: HOME | End: 2022-09-12

## 2022-09-12 ENCOUNTER — APPOINTMENT (OUTPATIENT)
Dept: ULTRASOUND IMAGING | Age: 47
End: 2022-09-12
Payer: MEDICARE

## 2022-09-12 DIAGNOSIS — R07.9 CHEST PAIN, UNSPECIFIED TYPE: Primary | ICD-10-CM

## 2022-09-12 PROBLEM — N18.6 ESRD (END STAGE RENAL DISEASE) (HCC): Status: ACTIVE | Noted: 2022-09-12

## 2022-09-12 LAB
ALBUMIN SERPL-MCNC: 3.2 GM/DL (ref 3.4–5)
ALP BLD-CCNC: 70 IU/L (ref 40–128)
ALT SERPL-CCNC: 11 U/L (ref 10–40)
ANION GAP SERPL CALCULATED.3IONS-SCNC: 14 MMOL/L (ref 4–16)
ANION GAP SERPL CALCULATED.3IONS-SCNC: 16 MMOL/L (ref 4–16)
AST SERPL-CCNC: 16 IU/L (ref 15–37)
BASOPHILS ABSOLUTE: 0 K/CU MM
BASOPHILS RELATIVE PERCENT: 0.4 % (ref 0–1)
BILIRUB SERPL-MCNC: 0.3 MG/DL (ref 0–1)
BUN BLDV-MCNC: 45 MG/DL (ref 6–23)
BUN BLDV-MCNC: 79 MG/DL (ref 6–23)
CALCIUM SERPL-MCNC: 8 MG/DL (ref 8.3–10.6)
CALCIUM SERPL-MCNC: 8.1 MG/DL (ref 8.3–10.6)
CHLORIDE BLD-SCNC: 100 MMOL/L (ref 99–110)
CHLORIDE BLD-SCNC: 97 MMOL/L (ref 99–110)
CO2: 21 MMOL/L (ref 21–32)
CO2: 23 MMOL/L (ref 21–32)
CREAT SERPL-MCNC: 5.3 MG/DL (ref 0.9–1.3)
CREAT SERPL-MCNC: 8.8 MG/DL (ref 0.9–1.3)
DIFFERENTIAL TYPE: ABNORMAL
EKG ATRIAL RATE: 86 BPM
EKG DIAGNOSIS: NORMAL
EKG P AXIS: 13 DEGREES
EKG P-R INTERVAL: 182 MS
EKG Q-T INTERVAL: 406 MS
EKG QRS DURATION: 80 MS
EKG QTC CALCULATION (BAZETT): 485 MS
EKG R AXIS: 14 DEGREES
EKG T AXIS: 78 DEGREES
EKG VENTRICULAR RATE: 86 BPM
EOSINOPHILS ABSOLUTE: 0.2 K/CU MM
EOSINOPHILS RELATIVE PERCENT: 2.1 % (ref 0–3)
GFR AFRICAN AMERICAN: 14 ML/MIN/1.73M2
GFR AFRICAN AMERICAN: 8 ML/MIN/1.73M2
GFR NON-AFRICAN AMERICAN: 12 ML/MIN/1.73M2
GFR NON-AFRICAN AMERICAN: 7 ML/MIN/1.73M2
GLUCOSE BLD-MCNC: 119 MG/DL (ref 70–99)
GLUCOSE BLD-MCNC: 154 MG/DL (ref 70–99)
GLUCOSE BLD-MCNC: 176 MG/DL (ref 70–99)
HCT VFR BLD CALC: 29.9 % (ref 42–52)
HEMOGLOBIN: 9.6 GM/DL (ref 13.5–18)
IMMATURE NEUTROPHIL %: 0.6 % (ref 0–0.43)
LYMPHOCYTES ABSOLUTE: 0.8 K/CU MM
LYMPHOCYTES RELATIVE PERCENT: 9.9 % (ref 24–44)
MCH RBC QN AUTO: 28.9 PG (ref 27–31)
MCHC RBC AUTO-ENTMCNC: 32.1 % (ref 32–36)
MCV RBC AUTO: 90.1 FL (ref 78–100)
MONOCYTES ABSOLUTE: 0.5 K/CU MM
MONOCYTES RELATIVE PERCENT: 6.4 % (ref 0–4)
NUCLEATED RBC %: 0 %
PDW BLD-RTO: 14.7 % (ref 11.7–14.9)
PLATELET # BLD: 257 K/CU MM (ref 140–440)
PMV BLD AUTO: 9.7 FL (ref 7.5–11.1)
POTASSIUM SERPL-SCNC: 3.9 MMOL/L (ref 3.5–5.1)
POTASSIUM SERPL-SCNC: 4.6 MMOL/L (ref 3.5–5.1)
RBC # BLD: 3.32 M/CU MM (ref 4.6–6.2)
SEGMENTED NEUTROPHILS ABSOLUTE COUNT: 6.8 K/CU MM
SEGMENTED NEUTROPHILS RELATIVE PERCENT: 80.6 % (ref 36–66)
SODIUM BLD-SCNC: 132 MMOL/L (ref 135–145)
SODIUM BLD-SCNC: 139 MMOL/L (ref 135–145)
TOTAL IMMATURE NEUTOROPHIL: 0.05 K/CU MM
TOTAL NUCLEATED RBC: 0 K/CU MM
TOTAL PROTEIN: 7 GM/DL (ref 6.4–8.2)
TROPONIN T: 0.12 NG/ML
TROPONIN T: 0.16 NG/ML
WBC # BLD: 8.4 K/CU MM (ref 4–10.5)

## 2022-09-12 PROCEDURE — 96372 THER/PROPH/DIAG INJ SC/IM: CPT

## 2022-09-12 PROCEDURE — 6360000002 HC RX W HCPCS: Performed by: INTERNAL MEDICINE

## 2022-09-12 PROCEDURE — 99285 EMERGENCY DEPT VISIT HI MDM: CPT

## 2022-09-12 PROCEDURE — G0378 HOSPITAL OBSERVATION PER HR: HCPCS

## 2022-09-12 PROCEDURE — 94761 N-INVAS EAR/PLS OXIMETRY MLT: CPT

## 2022-09-12 PROCEDURE — 2580000003 HC RX 258: Performed by: INTERNAL MEDICINE

## 2022-09-12 PROCEDURE — 2140000000 HC CCU INTERMEDIATE R&B

## 2022-09-12 PROCEDURE — 71045 X-RAY EXAM CHEST 1 VIEW: CPT

## 2022-09-12 PROCEDURE — 85025 COMPLETE CBC W/AUTO DIFF WBC: CPT

## 2022-09-12 PROCEDURE — 99215 OFFICE O/P EST HI 40 MIN: CPT | Performed by: INTERNAL MEDICINE

## 2022-09-12 PROCEDURE — 93005 ELECTROCARDIOGRAM TRACING: CPT | Performed by: EMERGENCY MEDICINE

## 2022-09-12 PROCEDURE — 80053 COMPREHEN METABOLIC PANEL: CPT

## 2022-09-12 PROCEDURE — 84484 ASSAY OF TROPONIN QUANT: CPT

## 2022-09-12 PROCEDURE — 6370000000 HC RX 637 (ALT 250 FOR IP): Performed by: INTERNAL MEDICINE

## 2022-09-12 PROCEDURE — 36415 COLL VENOUS BLD VENIPUNCTURE: CPT

## 2022-09-12 PROCEDURE — 93010 ELECTROCARDIOGRAM REPORT: CPT | Performed by: INTERNAL MEDICINE

## 2022-09-12 PROCEDURE — 82962 GLUCOSE BLOOD TEST: CPT

## 2022-09-12 PROCEDURE — 80048 BASIC METABOLIC PNL TOTAL CA: CPT

## 2022-09-12 PROCEDURE — 93925 LOWER EXTREMITY STUDY: CPT

## 2022-09-12 RX ORDER — SODIUM CHLORIDE 0.9 % (FLUSH) 0.9 %
5-40 SYRINGE (ML) INJECTION EVERY 12 HOURS SCHEDULED
Status: DISCONTINUED | OUTPATIENT
Start: 2022-09-12 | End: 2022-09-13 | Stop reason: SDUPTHER

## 2022-09-12 RX ORDER — CALCITRIOL 0.25 UG/1
0.25 CAPSULE, LIQUID FILLED ORAL DAILY
Status: DISCONTINUED | OUTPATIENT
Start: 2022-09-12 | End: 2022-09-14 | Stop reason: HOSPADM

## 2022-09-12 RX ORDER — ATORVASTATIN CALCIUM 40 MG/1
40 TABLET, FILM COATED ORAL NIGHTLY
Status: DISCONTINUED | OUTPATIENT
Start: 2022-09-12 | End: 2022-09-14 | Stop reason: HOSPADM

## 2022-09-12 RX ORDER — INSULIN GLARGINE 100 [IU]/ML
60 INJECTION, SOLUTION SUBCUTANEOUS NIGHTLY
Status: DISCONTINUED | OUTPATIENT
Start: 2022-09-12 | End: 2022-09-14 | Stop reason: HOSPADM

## 2022-09-12 RX ORDER — SEVELAMER CARBONATE 800 MG/1
800 TABLET, FILM COATED ORAL
Status: DISCONTINUED | OUTPATIENT
Start: 2022-09-12 | End: 2022-09-14 | Stop reason: HOSPADM

## 2022-09-12 RX ORDER — FOLIC ACID/VIT B COMPLEX AND C 5 MG
1 TABLET ORAL DAILY
Status: DISCONTINUED | OUTPATIENT
Start: 2022-09-12 | End: 2022-09-12 | Stop reason: CLARIF

## 2022-09-12 RX ORDER — ACETAMINOPHEN 650 MG/1
650 SUPPOSITORY RECTAL EVERY 6 HOURS PRN
Status: DISCONTINUED | OUTPATIENT
Start: 2022-09-12 | End: 2022-09-13 | Stop reason: SDUPTHER

## 2022-09-12 RX ORDER — ONDANSETRON 4 MG/1
4 TABLET, ORALLY DISINTEGRATING ORAL EVERY 8 HOURS PRN
Status: DISCONTINUED | OUTPATIENT
Start: 2022-09-12 | End: 2022-09-14 | Stop reason: HOSPADM

## 2022-09-12 RX ORDER — RANOLAZINE 500 MG/1
500 TABLET, EXTENDED RELEASE ORAL 2 TIMES DAILY
Status: DISCONTINUED | OUTPATIENT
Start: 2022-09-12 | End: 2022-09-14 | Stop reason: HOSPADM

## 2022-09-12 RX ORDER — POLYETHYLENE GLYCOL 3350 17 G/17G
17 POWDER, FOR SOLUTION ORAL DAILY PRN
Status: DISCONTINUED | OUTPATIENT
Start: 2022-09-12 | End: 2022-09-14 | Stop reason: HOSPADM

## 2022-09-12 RX ORDER — INSULIN LISPRO 100 [IU]/ML
25 INJECTION, SOLUTION INTRAVENOUS; SUBCUTANEOUS
Status: DISCONTINUED | OUTPATIENT
Start: 2022-09-12 | End: 2022-09-14 | Stop reason: HOSPADM

## 2022-09-12 RX ORDER — PANTOPRAZOLE SODIUM 40 MG/1
40 TABLET, DELAYED RELEASE ORAL
Status: DISCONTINUED | OUTPATIENT
Start: 2022-09-13 | End: 2022-09-14 | Stop reason: HOSPADM

## 2022-09-12 RX ORDER — SODIUM CHLORIDE 0.9 % (FLUSH) 0.9 %
5-40 SYRINGE (ML) INJECTION PRN
Status: DISCONTINUED | OUTPATIENT
Start: 2022-09-12 | End: 2022-09-13 | Stop reason: SDUPTHER

## 2022-09-12 RX ORDER — CLOPIDOGREL BISULFATE 75 MG/1
75 TABLET ORAL DAILY
Status: DISCONTINUED | OUTPATIENT
Start: 2022-09-12 | End: 2022-09-14 | Stop reason: HOSPADM

## 2022-09-12 RX ORDER — M-VIT,TX,IRON,MINS/CALC/FOLIC 27MG-0.4MG
1 TABLET ORAL DAILY
Status: DISCONTINUED | OUTPATIENT
Start: 2022-09-12 | End: 2022-09-14 | Stop reason: HOSPADM

## 2022-09-12 RX ORDER — GINSENG 100 MG
CAPSULE ORAL
COMMUNITY
Start: 2022-08-18

## 2022-09-12 RX ORDER — DEXTROSE MONOHYDRATE 100 MG/ML
INJECTION, SOLUTION INTRAVENOUS CONTINUOUS PRN
Status: DISCONTINUED | OUTPATIENT
Start: 2022-09-12 | End: 2022-09-14 | Stop reason: HOSPADM

## 2022-09-12 RX ORDER — INSULIN LISPRO 100 [IU]/ML
0-4 INJECTION, SOLUTION INTRAVENOUS; SUBCUTANEOUS NIGHTLY
Status: DISCONTINUED | OUTPATIENT
Start: 2022-09-12 | End: 2022-09-14 | Stop reason: HOSPADM

## 2022-09-12 RX ORDER — METHOCARBAMOL 500 MG/1
500 TABLET, FILM COATED ORAL NIGHTLY
Status: DISCONTINUED | OUTPATIENT
Start: 2022-09-12 | End: 2022-09-14 | Stop reason: HOSPADM

## 2022-09-12 RX ORDER — ARIPIPRAZOLE 5 MG/1
5 TABLET ORAL NIGHTLY
Status: DISCONTINUED | OUTPATIENT
Start: 2022-09-12 | End: 2022-09-14 | Stop reason: HOSPADM

## 2022-09-12 RX ORDER — ACETAMINOPHEN 325 MG/1
650 TABLET ORAL EVERY 6 HOURS PRN
Status: DISCONTINUED | OUTPATIENT
Start: 2022-09-12 | End: 2022-09-13 | Stop reason: SDUPTHER

## 2022-09-12 RX ORDER — TRAZODONE HYDROCHLORIDE 50 MG/1
50 TABLET ORAL NIGHTLY PRN
Status: DISCONTINUED | OUTPATIENT
Start: 2022-09-12 | End: 2022-09-14 | Stop reason: HOSPADM

## 2022-09-12 RX ORDER — ASPIRIN 81 MG/1
81 TABLET ORAL DAILY
Status: DISCONTINUED | OUTPATIENT
Start: 2022-09-13 | End: 2022-09-14 | Stop reason: HOSPADM

## 2022-09-12 RX ORDER — ASPIRIN 81 MG/1
324 TABLET, CHEWABLE ORAL ONCE
Status: DISCONTINUED | OUTPATIENT
Start: 2022-09-12 | End: 2022-09-14 | Stop reason: HOSPADM

## 2022-09-12 RX ORDER — HEPARIN SODIUM 5000 [USP'U]/ML
5000 INJECTION, SOLUTION INTRAVENOUS; SUBCUTANEOUS EVERY 8 HOURS SCHEDULED
Status: DISCONTINUED | OUTPATIENT
Start: 2022-09-12 | End: 2022-09-14 | Stop reason: HOSPADM

## 2022-09-12 RX ORDER — OXYCODONE HYDROCHLORIDE AND ACETAMINOPHEN 5; 325 MG/1; MG/1
1 TABLET ORAL EVERY 12 HOURS PRN
Status: DISCONTINUED | OUTPATIENT
Start: 2022-09-12 | End: 2022-09-13

## 2022-09-12 RX ORDER — SEVELAMER CARBONATE 800 MG/1
800 TABLET, FILM COATED ORAL
COMMUNITY
Start: 2022-08-09

## 2022-09-12 RX ORDER — INSULIN LISPRO 100 [IU]/ML
0-16 INJECTION, SOLUTION INTRAVENOUS; SUBCUTANEOUS
Status: DISCONTINUED | OUTPATIENT
Start: 2022-09-12 | End: 2022-09-14 | Stop reason: HOSPADM

## 2022-09-12 RX ORDER — ONDANSETRON 2 MG/ML
4 INJECTION INTRAMUSCULAR; INTRAVENOUS EVERY 6 HOURS PRN
Status: DISCONTINUED | OUTPATIENT
Start: 2022-09-12 | End: 2022-09-14 | Stop reason: HOSPADM

## 2022-09-12 RX ORDER — FOLIC ACID/VIT B COMPLEX AND C 0.8 MG
1 TABLET ORAL DAILY
COMMUNITY
Start: 2022-08-09

## 2022-09-12 RX ORDER — METHOCARBAMOL 500 MG/1
500 TABLET, FILM COATED ORAL NIGHTLY
COMMUNITY
Start: 2022-08-31

## 2022-09-12 RX ORDER — SODIUM CHLORIDE 9 MG/ML
INJECTION, SOLUTION INTRAVENOUS PRN
Status: DISCONTINUED | OUTPATIENT
Start: 2022-09-12 | End: 2022-09-13 | Stop reason: SDUPTHER

## 2022-09-12 RX ADMIN — INSULIN GLARGINE 60 UNITS: 100 INJECTION, SOLUTION SUBCUTANEOUS at 21:07

## 2022-09-12 RX ADMIN — MULTIPLE VITAMINS W/ MINERALS TAB 1 TABLET: TAB at 21:05

## 2022-09-12 RX ADMIN — METHOCARBAMOL 500 MG: 500 TABLET ORAL at 21:05

## 2022-09-12 RX ADMIN — ATORVASTATIN CALCIUM 40 MG: 40 TABLET, FILM COATED ORAL at 21:05

## 2022-09-12 RX ADMIN — CLOPIDOGREL BISULFATE 75 MG: 75 TABLET ORAL at 21:22

## 2022-09-12 RX ADMIN — SODIUM CHLORIDE, PRESERVATIVE FREE 10 ML: 5 INJECTION INTRAVENOUS at 21:04

## 2022-09-12 RX ADMIN — HEPARIN SODIUM 5000 UNITS: 5000 INJECTION INTRAVENOUS; SUBCUTANEOUS at 21:06

## 2022-09-12 RX ADMIN — SODIUM CHLORIDE, PRESERVATIVE FREE 5 ML: 5 INJECTION INTRAVENOUS at 20:30

## 2022-09-12 RX ADMIN — CALCITRIOL CAPSULES 0.25 MCG 0.25 MCG: 0.25 CAPSULE ORAL at 19:50

## 2022-09-12 RX ADMIN — RANOLAZINE 500 MG: 500 TABLET, EXTENDED RELEASE ORAL at 19:50

## 2022-09-12 RX ADMIN — SEVELAMER CARBONATE 800 MG: 800 TABLET, FILM COATED ORAL at 19:50

## 2022-09-12 RX ADMIN — METOPROLOL TARTRATE 12.5 MG: 25 TABLET, FILM COATED ORAL at 19:50

## 2022-09-12 RX ADMIN — ARIPIPRAZOLE 5 MG: 5 TABLET ORAL at 19:50

## 2022-09-12 RX ADMIN — INSULIN LISPRO 25 UNITS: 100 INJECTION, SOLUTION INTRAVENOUS; SUBCUTANEOUS at 19:49

## 2022-09-12 RX ADMIN — TRAZODONE HYDROCHLORIDE 50 MG: 50 TABLET ORAL at 21:05

## 2022-09-12 RX ADMIN — OXYCODONE HYDROCHLORIDE AND ACETAMINOPHEN 1 TABLET: 5; 325 TABLET ORAL at 20:28

## 2022-09-12 ASSESSMENT — PAIN DESCRIPTION - ORIENTATION
ORIENTATION: RIGHT
ORIENTATION: RIGHT
ORIENTATION: RIGHT;LEFT;LOWER

## 2022-09-12 ASSESSMENT — ENCOUNTER SYMPTOMS
SHORTNESS OF BREATH: 0
VOMITING: 0
CHEST TIGHTNESS: 1
NAUSEA: 0
DIARRHEA: 0
ABDOMINAL PAIN: 0
EYES NEGATIVE: 1

## 2022-09-12 ASSESSMENT — PAIN SCALES - GENERAL
PAINLEVEL_OUTOF10: 5
PAINLEVEL_OUTOF10: 5
PAINLEVEL_OUTOF10: 0
PAINLEVEL_OUTOF10: 6
PAINLEVEL_OUTOF10: 5

## 2022-09-12 ASSESSMENT — PAIN DESCRIPTION - LOCATION
LOCATION: FOOT
LOCATION: LEG;FOOT
LOCATION: CHEST;OTHER (COMMENT)
LOCATION: FOOT

## 2022-09-12 ASSESSMENT — PAIN SCALES - WONG BAKER: WONGBAKER_NUMERICALRESPONSE: 0

## 2022-09-12 ASSESSMENT — PAIN - FUNCTIONAL ASSESSMENT
PAIN_FUNCTIONAL_ASSESSMENT: 0-10
PAIN_FUNCTIONAL_ASSESSMENT: PREVENTS OR INTERFERES SOME ACTIVE ACTIVITIES AND ADLS
PAIN_FUNCTIONAL_ASSESSMENT: ACTIVITIES ARE NOT PREVENTED

## 2022-09-12 ASSESSMENT — PAIN DESCRIPTION - DESCRIPTORS
DESCRIPTORS: SHARP
DESCRIPTORS: SHARP
DESCRIPTORS: ACHING;STABBING

## 2022-09-12 ASSESSMENT — PAIN DESCRIPTION - PAIN TYPE: TYPE: CHRONIC PAIN

## 2022-09-12 ASSESSMENT — PAIN DESCRIPTION - FREQUENCY: FREQUENCY: CONTINUOUS

## 2022-09-12 NOTE — CONSULTS
CARDIOLOGY CONSULT NOTE   Reason for consultation:  chest pain     Referring physician:  Helen Jamison MD     Primary care physician: Elfego Virk MD      Dear  Dr. Helen Jamison MD   Thanks for the consult. Chief Complaints :  Chief Complaint   Patient presents with    Chest Pain     Chest pain that started at dialysis          History of present illness:Yovanny is a 52 y. o.year old who presents complaints of chest pain which occurring during his dialysis he says symptoms started soon his dialysis initiated hence he was sent into the emergency department he is history of end-stage renal disease unfortunately diabetic ulcers of the left forefoot underwent PCI to circumflex artery about 2 weeks ago he says he is compliant with antiplatelet has been taking medication regularly previous LAD stent was patent and RCA stent was patent    Past medical history:    has a past medical history of Abscess, Acute renal failure (ARF) ([address] Anxiety associated with depression, Anxiety associated with depression, Back pain, Chest pain, Diabetic nephropathy ([address] Diabetic neuropathy ([address] Diabetic ulcer of left midfoot associated with type 2 diabetes mellitus, with fat layer exposed ([address] Diverticulosis, DM (diabetes mellitus), type 2 ([address] 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 Past surgical history:   has a past surgical history that includes Cardiac catheterization (2003, 2013);  Tonsillectomy and adenoidectomy (1988); Upper gastrointestinal endoscopy (06/02/2011); Colonoscopy (06/02/2011); Nose surgery (1993); skin biopsy (N/A, 06/18/2013); other surgical history (Right, 05/22/2015); Toe amputation; Abdomen surgery; other surgical history (12/04/2017); pr deep incis foot bone infectn (Left, 09/22/2018); Upper gastrointestinal endoscopy (N/A, 01/12/2019); Toe amputation (Right, 03/23/2019); Toe amputation (Right, 08/06/2019); Scrotal surgery (N/A, 05/15/2021); Scrotal surgery (N/A, 05/16/2021); Foot surgery (Left, 12/21/2021); Upper gastrointestinal endoscopy (N/A, 07/26/2022); IR TUNNELED CVC PLACE WO SQ PORT/PUMP > 5 YEARS (07/27/2022); IR NONTUNNELED VASCULAR CATHETER > 5 YEARS (07/25/2022); and Coronary stent placement. Social History:   reports that he quit smoking about 2 months ago. His smoking use included cigarettes. He has never used smokeless tobacco. He reports that he does not currently use alcohol. He reports current drug use. Frequency: 7.00 times per week. Drug: Marijuana Lucianne Bars).   Family history:   no family history of CAD, STROKE of DM at early age    Allergies   Allergen Reactions    Adhesive Tape Rash    Doxycycline Nausea And Vomiting    Reglan [Metoclopramide] Anxiety       aspirin chewable tablet 324 mg, Once      Current Facility-Administered Medications   Medication Dose Route Frequency Provider Last Rate Last Admin    aspirin chewable tablet 324 mg  324 mg Oral Once Arthur Silva MD         Current Outpatient Medications   Medication Sig Dispense Refill    B Complex-C-Folic Acid (ALEX-JEFFERSON) TABS Take 1 tablet by mouth daily      methocarbamol (ROBAXIN) 500 MG tablet Take 500 mg by mouth nightly      sevelamer (RENVELA) 800 MG tablet Take 800 mg by mouth 3 times daily (with meals)      bacitracin 500 UNIT/GM ointment Apply topically      ranolazine (RANEXA) 500 MG extended release tablet Take 1 tablet by mouth 2 times daily 60 tablet 3    metoprolol tartrate (LOPRESSOR) 25 MG tablet Take 0.5 tablets by mouth 2 times daily 30 tablet 0    aspirin 81 MG EC tablet Take 1 tablet by mouth daily 30 tablet 0    clopidogrel (PLAVIX) 75 MG tablet Take 1 tablet by mouth daily 30 tablet 0    oxyCODONE-acetaminophen (PERCOCET) 5-325 MG per tablet Take 1.5 tablets by mouth daily. traZODone (DESYREL) 50 MG tablet Take 1 tablet by mouth nightly as needed for Sleep 30 tablet 0    alogliptin (NESINA) 6.25 MG TABS tablet Take 1 tablet by mouth in the morning. 30 tablet 0    insulin lispro, 1 Unit Dial, (HUMALOG KWIKPEN) 100 UNIT/ML SOPN Inject 20 Units into the skin in the morning and 20 Units at noon and 20 Units in the evening. Inject before meals. (Patient taking differently: Inject 35 Units into the skin 3 times daily (before meals)) 5 pen 0    insulin glargine (LANTUS SOLOSTAR) 100 UNIT/ML injection pen Inject 45 Units into the skin nightly (Patient taking differently: No sig reported) 5 pen 0    ARIPiprazole (ABILIFY) 5 MG tablet Take 1 tablet by mouth at bedtime 30 tablet 0    pantoprazole (PROTONIX) 40 MG tablet Take 1 tablet by mouth in the morning and 1 tablet in the evening. Take before meals. 30 tablet 0    sucralfate (CARAFATE) 1 GM tablet Take 1 tablet by mouth in the morning and 1 tablet in the evening. Take before meals. 120 tablet 3    calcitRIOL (ROCALTROL) 0.25 MCG capsule Take 1 capsule by mouth in the morning. 30 capsule 0    ondansetron (ZOFRAN) 4 MG tablet Take 1 tablet by mouth every 8 hours as needed for Nausea or Vomiting 20 tablet 0    Lancets MISC 1 each by Does not apply route daily 100 each 3    glucose monitoring kit (FREESTYLE) monitoring kit 1 each by Does not apply route once for 1 dose.  1 kit 0     Review of Systems:   Constitutional: No Fever or Weight Loss   Eyes: No Decreased Vision  ENT: No Headaches, Hearing Loss or Vertigo  Cardiovascular: As per HPI  Respiratory: As per HPI  Gastrointestinal: No abdominal pain, appetite loss, blood in stools, constipation, diarrhea or heartburn  Genitourinary: No dysuria, trouble voiding, or hematuria  Musculoskeletal:  No gait disturbance, weakness or joint complaints  Integumentary: No rash or pruritis  Neurological: No TIA or stroke symptoms  Psychiatric: No anxiety or depression  Endocrine: No malaise, fatigue or temperature intolerance  Hematologic/Lymphatic: No bleeding problems, blood clots or swollen lymph nodes  Allergic/Immunologic: No nasal congestion or hives  All systems negative except as marked. Physical Examination:    Vitals:    09/12/22 1303 09/12/22 1333 09/12/22 1346 09/12/22 1402   BP: (!) 154/92 (!) 169/103  (!) 178/100   Pulse: 86 85 85 84   Resp: 13 14 18 16   Temp:       SpO2: 98% 98% 97%    Weight:       Height:           General Appearance:  No distress, conversant    Constitutional:  Well developed, Well nourished, No acute distress, Non-toxic appearance. HENT:  Normocephalic, Atraumatic, Bilateral external ears normal, Oropharynx moist, No oral exudates, Nose normal. Neck- Normal range of motion, No tenderness, Supple, No stridor,no apical-carotid delay  Lymphatics : no palpable lymph nodes  Eyes:  PERRL, EOMI, Conjunctiva normal, No discharge. Respiratory:  Normal breath sounds, No respiratory distress, No wheezing, No chest tenderness. ,no use of accessory muscles, crackles Absent   Cardiovascular: (PMI) apex non displaced,no lifts no thrills, ankle swelling Absent  , 1+, s1 and s2 audible,Murmur. Absent , JVD not noted    Abdomen /GI:  Bowel sounds normal, Soft, No tenderness, No masses, No gross visceromegaly   :  No costovertebral angle tenderness   Musculoskeletal:  No edema, no tenderness, no deformities.  Back- no tenderness  Integument:  Well hydrated, no rash   Lymphatic:  No lymphadenopathy noted   Neurologic:  Alert & oriented x 3, CN 2-12 normal, normal motor function, normal sensory function, no focal deficits noted           Medical decision making and Data review:    Lab Review   Recent Labs 09/12/22  0950   WBC 8.4   HGB 9.6*   HCT 29.9*         Recent Labs     09/12/22  0950   *   K 3.9   CL 97*   CO2 21   BUN 45*   CREATININE 5.3*     Recent Labs     09/12/22  0950   AST 16   ALT 11   BILITOT 0.3   ALKPHOS 70     Recent Labs     09/12/22  0950   TROPONINT 0.119*       No results for input(s): PROBNP in the last 72 hours. Lab Results   Component Value Date    INR 1.08 08/23/2022    PROTIME 13.9 08/23/2022       EKG: (reviewed by myself)    ECHO:(reviewed by myself)    Chest Xray:(reviewed by myself)  XR CHEST PORTABLE    Result Date: 9/12/2022  EXAMINATION: ONE XRAY VIEW OF THE CHEST 9/12/2022 9:13 am COMPARISON: 07/25/2022 HISTORY: ORDERING SYSTEM PROVIDED HISTORY: cp at HD TECHNOLOGIST PROVIDED HISTORY: Reason for exam:->cp at HD Reason for Exam: cp at HD Additional signs and symptoms: cp at HD Relevant Medical/Surgical History: cp at HD FINDINGS: Low lung volumes. Right central venous catheter stable in positioning. No focal consolidation, pleural effusion or pneumothorax. The cardiomediastinal silhouette is stable. No overt pulmonary edema. The osseous structures are stable. No acute cardiopulmonary findings. All labs, medications and tests reviewed by myself including data  from outside source , patient and available family . Continue all other medications of all above medical condition listed as is. Impression:  Principal Problem:    Acute chest pain  Active Problems:    ESRD (end stage renal disease) [address] [address]    Diabetes mellitus type 2, improved controlled    ASCVD (arteriosclerotic cardiovascular disease)  Resolved Problems:    * No resolved hospital problems. *      Assessment: 52 y. 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 Plan and Recommendations:    Elevated Troponin : With ongoing intermittent chest pain with dialysis recent stent we will check serial troponins and plan cardiac catheterization keep n.p.o. after midnight  ESRD dialysis as per primary team  Diabetic foot ulcer check arterial Doppler  HTN: stable, continue present medications and add  High blood pressure could be contributing to chest pains add amlodipine 5 mg daily  Nitro as needed  DVT prophylaxis if no contraindication  6. Dyslipidemia: continue statins           Thank you  much for consult and giving us the opportunity in contributing in the care of this patient. Please feel free to call me for any questions.        Jose Corona MD, 9/12/2022 3:42 PM

## 2022-09-12 NOTE — CARE COORDINATION
Patient identified as potential readmission. Last admission 8/22-8/25 for chest pain. Patient here today for chest pain. CM met with patient to begin discharge planning. CM introduced self and CM role. Patient states he presents to ER with c/o chest pain while at dialysis. Patient receives dialysis Monday, Wednesday and Friday at [facility] on [facility] with a chair time of 545 am. Patient had stent placed approximately 2 weeks ago. Patient states he lives in a 2 story home in [place] with his spouse and children. Patient has PCP and insurance which assists with medication affordability. Patient uses the following DME: cane, walker. Patient denies need for any further DME at home. Patient states he uses [employer] for transportation to and from dialysis. Patient plan upon discharge is to return home with family. Denies needs at this time. Patient is requiring readmission for continued cardiac monitoring and there were no alternatives to admission to explore at this time.

## 2022-09-12 NOTE — PROGRESS NOTES
Wound Care Center Progress Note With Procedure    Brando Orona  AGE: 52 y.o. GENDER: male  : 1975  EPISODE DATE:  2022     Subjective:     Chief Complaint   Patient presents with    Wound Check     Left foot          HISTORY of PRESENT ILLNESS   Brando Orona is a 52 y.o. male who presents to the [address] [place] [address] for  evaluation and treatment of Acute on chronic diabetic  ulcer(s) of  Lt. foot forefoot. The condition is of moderate severity. The ulcer has been present for about 4 weeks on presentation (22). The underlying cause is thought to be diabetes, neuropathy. He has been seeing Dr. Nayeli Crandall for his feet, but has not been to see him in the last few months. The patient has significant underlying medical conditions as below. Doing ok this week. Did a lot of walking. Wound Pain Timing/Severity: waxing and waning, moderate  Quality of pain: dull, aching  Severity of pain:  3 / 10   Modifying Factors: venous stasis, diabetes, shear force, obesity, and anticoagulation therapy  Associated Signs/Symptoms: edema, numbness, and pain     Patient educated on the 6 essential components necessary for wound healing: Circulation, Debridements, Proper Dressings and Topical Wound Products, Infection Control, Edema Control and Offloading. Patient educated on those factors that negatively effect or impact wound healing: smoking, obesity, uncontrolled diabetes, anticoagulant and immunosuppressive regimens, inadequate nutrition, untreated arterial and venous disease if applicable and measures to manage edema.          PAST MEDICAL HISTORY        Diagnosis Date    2010    scrotal    Acute renal failure (ARF) ([facility] 2019    Anxiety associated with depression     Anxiety associated with depression     Back pain 2012    Chest pain 2013    Diabetic nephropathy ([facility] [address]     Diabetic neuropathy ([facility] 2011    Diabetic ulcer of left midfoot associated with type 2 diabetes mellitus, with fat layer exposed ([address] 07/18/2017    Diverticulosis     C scope + Dr. Diogenes Piedra    DM (diabetes mellitus), type 2 ([address] 2002    DR. 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 Diogenes Piedra    Necrotizing fasciitis [facility])     Nose fracture 1988    Panic attacks     Pericarditis 2003    Hospitalized with s/p heart cath normal    Peripheral autonomic neuropathy due to diabetes mellitus ([address]     Axonal EMG- NCS, March 2011    Sebaceous cyst 09/01/2011    URI (upper respiratory infection) 02/27/2012    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 type 2 diabetes mellitus, with muscle involvement without evidence of necrosis ([address] 9/21/2021    WD-Wound, surgical, infected, initial encounter 12/08/2017    Wrist fracture 1986       PAST SURGICAL HISTORY    Past Surgical History:   Procedure Laterality Date    [facility]  2003, 2013    Normal (Dr. Faina Goodrich)    COLONOSCOPY  6/2/2011    Pandiverticulosis, Nonbleeding internal hemorrhoids, Repeat colonoscopy at age 48- Dr. Barbara Barrett Left 12/21/2021    Pat Rothman performed by Maria Eugenia Cat DPM at [address] NONTUNNELED VASCULAR CATHETER  7/25/2022    IR NONTUNNELED VASCULAR CATHETER 7/25/2022 [facility] SPECIAL PROCEDURES    IR TUNNELED CATHETER PLACEMENT GREATER THAN 5 YEARS  7/27/2022    IR TUNNELED CATHETER PLACEMENT GREATER THAN 5 YEARS 7/27/2022 [facility] SPECIAL PROCEDURES    NOSE SURGERY  1993    \"had reconstruction surgery on nose a year after the other nose surgery\"    OTHER SURGICAL HISTORY Right 05/22/2015    I & D right great toe with partial amputation    OTHER SURGICAL HISTORY  12/04/2017    inc and drainage of abcess    [place] DEEP INCIS FOOT BONE INFECTN Left 9/22/2018 LEFT FOOT DEBRIDEMENT INCISION AND DRAINAGE TOP AND BOTTOM performed by Giulia Mendez DPM at [facility] N/A 5/15/2021    SCROTAL AND PERINEAL DEBRIDEMENT performed by Josseline Tapia MD at [facility] N/A 2021    SCROTAL RE-DEBRIDEMENT  INCISION AND DRAINAGE performed by Josseline Tapia MD at [address] [id number]    sebaceous cyst removal times 4     TOE AMPUTATION      right great toe    TOE AMPUTATION Right 3/23/2019    TOE AMPUTATION RIGHT 5TH TOE performed by Giulia Mendez DPM at [address] Right 2019    TOE AMPUTATION RIGHT 4TH TOE performed by Giulia Mendez DPM at [address] [place] ENDOSCOPY  2011    Mild gastritis with moderatley severe antritis, small hiatal hernia, Dr. Brenda Gonsales 2019    EGD BIOPSY performed by Erica Love MD at [facility] N/A 2022    EGD DIAGNOSTIC ONLY performed by Erica Love MD at [facility]    Family History   Problem Relation Age of Onset    Cancer Mother         ?site    Stroke Mother     Bleeding Prob Mother     Diabetes Father     Heart Disease Father     High Blood Pressure Father     Obesity Father     Kidney Disease Father     Diabetes Sister     High Blood Pressure Sister     Mental Illness Sister     Obesity Sister     High Blood Pressure Other     Mental Illness Other         bipolar    Other Son         cyst in ear canal    ADHD Daughter        SOCIAL HISTORY    Social History     Tobacco Use    Smoking status: Former     Years: 20.00     Types: Cigarettes     Quit date: 2022     Years since quittin.1    Smokeless tobacco: Never    Tobacco comments:     Smokes when drinking/social   Vaping Use    Vaping Use: Never used   Substance Use Topics    Alcohol use: Not Currently     Comment: occ    Drug use: Yes     Frequency: 7.0 times per week     Types: Fabricio Lester     Comment: 12/20/21 @ 2000       ALLERGIES    Allergies   Allergen Reactions    Adhesive Tape Rash    Doxycycline Nausea And Vomiting    Reglan [Metoclopramide] Anxiety       MEDICATIONS    Current Outpatient Medications on File Prior to Encounter   Medication Sig Dispense Refill    ranolazine (RANEXA) 500 MG extended release tablet Take 1 tablet by mouth 2 times daily 60 tablet 3    metoprolol tartrate (LOPRESSOR) 25 MG tablet Take 0.5 tablets by mouth 2 times daily 30 tablet 0    aspirin 81 MG EC tablet Take 1 tablet by mouth daily 30 tablet 0    clopidogrel (PLAVIX) 75 MG tablet Take 1 tablet by mouth daily 30 tablet 0    oxyCODONE-acetaminophen (PERCOCET) 5-325 MG per tablet Take 1 tablet by mouth every 8 hours as needed for Pain (Leg and feet pain). traZODone (DESYREL) 50 MG tablet Take 1 tablet by mouth nightly as needed for Sleep 30 tablet 0    alogliptin (NESINA) 6.25 MG TABS tablet Take 1 tablet by mouth in the morning. 30 tablet 0    insulin lispro, 1 Unit Dial, (HUMALOG KWIKPEN) 100 UNIT/ML SOPN Inject 20 Units into the skin in the morning and 20 Units at noon and 20 Units in the evening. Inject before meals. 5 pen 0    insulin glargine (LANTUS SOLOSTAR) 100 UNIT/ML injection pen Inject 45 Units into the skin nightly 5 pen 0    ARIPiprazole (ABILIFY) 5 MG tablet Take 1 tablet by mouth at bedtime 30 tablet 0    pantoprazole (PROTONIX) 40 MG tablet Take 1 tablet by mouth in the morning and 1 tablet in the evening. Take before meals. 30 tablet 0    sucralfate (CARAFATE) 1 GM tablet Take 1 tablet by mouth in the morning and 1 tablet in the evening. Take before meals. 120 tablet 3    calcitRIOL (ROCALTROL) 0.25 MCG capsule Take 1 capsule by mouth in the morning.  30 capsule 0    [DISCONTINUED] furosemide (LASIX) 40 MG tablet Take 1 tablet by mouth daily 30 tablet 1    [DISCONTINUED] insulin aspart (NOVOLOG) 100 UNIT/ML injection vial Inject 35 Units into the skin 3 times daily (before meals)      atorvastatin (LIPITOR) 40 MG tablet Take 1 tablet by mouth nightly 30 tablet 3    [DISCONTINUED] amLODIPine (NORVASC) 10 MG tablet Take 1 tablet by mouth daily 30 tablet 3    [DISCONTINUED] chlorthalidone (HYGROTON) 25 MG tablet Take 1 tablet by mouth daily 30 tablet 3    sertraline (ZOLOFT) 50 MG tablet Take 2 tablets by mouth daily 30 tablet 3    [DISCONTINUED] linagliptin (TRADJENTA) 5 MG tablet Take 1 tablet by mouth daily 30 tablet 5    ondansetron (ZOFRAN) 4 MG tablet Take 1 tablet by mouth every 8 hours as needed for Nausea or Vomiting 20 tablet 0    Lancets MISC 1 each by Does not apply route daily 100 each 3    glucose monitoring kit (FREESTYLE) monitoring kit 1 each by Does not apply route once for 1 dose. 1 kit 0     No current facility-administered medications on file prior to encounter. REVIEW OF SYSTEMS    Pertinent items are noted in HPI. Constitutional: Negative for systemic symptoms including fever, chills and malaise. Objective:      BP (!) 157/78   Pulse 93   Temp 98.7 °F (37.1 °C) (Temporal)   Resp 18     PHYSICAL EXAM  General: The patient is in no acute distress. Mental status:  Patient is appropriate, is  oriented to place and plan of care.   Dermatologic exam: Visual inspection of the periwound reveals the skin to be well hydrated  Wound exam: see wound description below in procedure note      Assessment:     Problem List Items Addressed This Visit          Endocrine    Diabetic neuropathy ([address] (Chronic)    WD - Ulcer of forefoot due to type 2 diabetes mellitus ([address] - Primary    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 type 2 diabetes mellitus, with muscle involvement without evidence of necrosis ([address]     Procedure Note    Indications:  Based on my examination of this patient's wound(s) today, sharp excision into necrotic epidermis, dermis, and subcutaneous tissue is required to promote healing and evaluate the extent of previous healing. Performed by: Stephanie Vasquez MD    Consent obtained: Yes    Time out taken:  Yes    Pain Control: topical lidocaine       Debridement:Excisional Debridement    Using curette the wound(s) was/were sharply debrided down through and including the removal of epidermis, dermis, and subcutaneous tissue.         Devitalized Tissue Debrided:  fibrin, necrotic/eschar, and callus    Pre Debridement Measurements:  Are located in the Wound Documentation Flow Sheet    All active wounds listed below with today's date are evaluated  Wound(s)    debrided this date include # : 1     Post  Debridement Measurements:  Wound 12/08/17 #3 abdoment (onset 3 days ago) SURGICAL (Active)   Number of days: 1738       Wound 12/08/17 #4 Lt plantar (onset 6 months ago) DIABETIC ALCALA 1 (Active)   Number of days: 1738       Wound 05/18/18 # 5 LEFT PLANTAR (ONSET 1 YEAR AGO) DM WAG 1 (Active)   Number of days: 3918       Wound 09/17/18 Left dorsal foot and 4th toe amp site 9/19/18 (Active)   Number of days: 1455       Incision 05/22/15 Foot Right (Active)   Number of days: 2669       Incision 12/04/17 Abdomen Mid (Active)   Number of days: 7724       Wound 01/11/19 left plantar foot wound (Active)   Number of days: 0704       Wound 03/21/19 Toe (Comment  which one) Anterior;Right (Active)   Number of days: 1270       Wound 03/21/19 Foot Left;Posterior hard callus area (Active)   Number of days: 1270       Wound 03/24/21 Left;Plantar (Active)   Number of days: 536       Wound 05/13/21 Left;Plantar (Active)   Number of days: 486       Wound 05/17/21 Groin Right scrotum extends to lower buttock (Active)   Number of days: 482       Wound 09/01/22 #1 Left Plantar (Active)   Wound Image   09/01/22 1421   Wound Etiology Diabetic 09/08/22 1555   Dressing Status New dressing applied 09/08/22 1555   Wound Cleansed Wound cleanser 09/08/22 1523   Offloading for Diabetic Foot Ulcers Forefoot offloading shoe 09/08/22 1555 Wound Length (cm) 0.9 cm 09/08/22 1523   Wound Width (cm) 0.5 cm 09/08/22 1523   Wound Depth (cm) 0.2 cm 09/08/22 1523   Wound Surface Area (cm^2) 0.45 cm^2 09/08/22 1523   Change in Wound Size % (l*w) 91.82 09/08/22 1523   Wound Volume (cm^3) 0.09 cm^3 09/08/22 1523   Wound Healing % 84 09/08/22 1523   Post-Procedure Length (cm) 0.9 cm 09/08/22 1536   Post-Procedure Width (cm) 0.5 cm 09/08/22 1536   Post-Procedure Depth (cm) 0.2 cm 09/08/22 1536   Post-Procedure Surface Area (cm^2) 0.45 cm^2 09/08/22 1536   Post-Procedure Volume (cm^3) 0.09 cm^3 09/08/22 1536   Distance Tunneling (cm) 0 cm 09/08/22 1523   Tunneling Position ___ O'Clock 0 09/08/22 1523   Undermining Starts ___ O'Clock 0 09/08/22 1523   Undermining Ends___ O'Clock 0 09/08/22 1523   Undermining Maxium Distance (cm) 0 09/08/22 1523   Wound Assessment Pink/red 09/08/22 1523   Drainage Amount Small 09/08/22 1523   Drainage Description Yellow 09/08/22 1523   Odor None 09/08/22 1523   Shazia-wound Assessment Hyperkeratosis (callous) 09/08/22 1523   Margins Defined edges 09/08/22 1523   Wound Thickness Description not for Pressure Injury Full thickness 09/08/22 1523   Number of days: 10       Percent of Wound(s) Debrided: approximately 100%    Total  Area  Debrided:  <1 sq cm     Bleeding:  Minimal    Hemostasis Achieved:  by pressure    Procedural Pain:  0  / 10     Post Procedural Pain:  0 / 10     Response to treatment:  Well tolerated by patient. Status of wound progress and description from last visit:     Improving in size . Mildly macerated but reports he walked a lot this week. Will try a forefoot offloading shoe    Plan:       Discharge Instructions         PHYSICIAN ORDERS AND DISCHARGE INSTRUCTIONS     Wound cleansing:              Do not scrub or use excessive force. Wash hands with soap and water before and after dressing changes.              Prior to applying a clean dressing, cleanse wound with normal saline, wound cleanser, or mild soap and water. Ask the physician or nurse before getting the wound(s) wet in a shower     Daily Wound management:             Keep weight off wounds and reposition every 2 hours. Avoid standing for long periods of time. Apply wraps/stockings in AM and remove at bedtime. If swelling is present, elevate legs to the level of the heart or above for              30 minutes 4-5 times a day and/or when sitting. When taking antibiotics take entire prescription as ordered by              physician do not stop taking until medicine is all gone. Grafts:                   Wound Care Notes:  Rx:    Cultures:                                          Orders for this week: 22                 Left plantar wound: Wash with soap and water, pat dry. Apply anasept gel and stimulen to wound bed. Cover with ca alginate and foam  Wrap with conform  Secure with tape   Change daily    Give forefoot offloading shoe today in clinic 22 for left foot        Follow Up Instructions: At the [address] [place] [address] in 1 week   [facility]   Call  for any questions or concerns.   Central [phone] for imaging lab work        Treatment Note Wound 22 #1 Left Plantar-Dressing/Treatment:  (anasept,stimulen,ca ag,foam,conform tape)    Written Patient Dismissal Instructions Given            Electronically signed by Omar Chand MD on 2022 at 11:03 PM

## 2022-09-12 NOTE — ED NOTES
Patient to [address] via stretcher.       Celeste Negro RN  09/12/22 [id number]

## 2022-09-12 NOTE — H&P
V2.0  History and Physical      Name:  Loren Vaughn /Age/Sex: 1975  (52 y.o. male)   MRN & CSN:  [id number] & [id number] Encounter Date/Time: 2022 3:10 PM EDT   Location:  [facility] PCP: Ari Graham MD       Hospital Day: 1    Assessment and Plan:   Loren Vaughn is a 52 y.o. male with a pmh of disease s/p PCI  and and recent PCI on 2022, ESRD on hemodialysis 3 times a week, insulin-dependent type 2 diabetes mellitus, history of Irene's gangrene, hypertension, hyperlipidemia presenting with chest tightness during dialysis    Hospital Problems             Last Modified POA    * (Principal) Acute chest pain 2022 Yes     Chest tightness  -Likely due to demand ischemia during dialysis  -Per patient was hypotensive during dialysis session  -Troponins in the ED 0.119  -EKG, normal sinus rhythm, no ST-T wave changes concerning for ACS  -Cardiology on board  -Continue Ranexa    NSTEMI  Likely due to demand ischemia  Initial troponin 0.119  Cycle troponins x2    Coronary artery disease s/p PCI on 2022 and  in   On aspirin and Plavix, compliant  Not taking atorvastatin, will resume  LHC on 2022 revealed 90% stenosis mid circumflex, which was stented with KELLY-III flow, otherwise no hemodynamically significant disease  Cardiology has been consulted from ED    ESRD  3 times a week HD  Nephrology on board    History of anemia  Due to ESRD  Patient on iron infusion during dialysis  Hemoglobin on presentation 9.6G/DL    Hypertension  On amlodipine, metoprolol, resumed    Type 2 diabetes mellitus, insulin-dependent  Takes Lantus 75 units at night and 35 units 3 times a day  Resume add Lantus 60 units at night and 25 units 3 times a day  POCT blood glucose 4 times daily  Titrate insulin as able  Hypoglycemia protocol    Anxiety and depression  On trazodone, Abilify      Disposition:   Current Living situation: home   Expected Disposition: home   Estimated D/C: 2 days     Diet No diet orders on file   DVT Prophylaxis [] Lovenox, [x]  Heparin, [] SCDs, [] Ambulation,  [] Eliquis, [] Xarelto, [] Coumadin   Code Status Prior   Surrogate Decision Maker/ POA      History from:     patient    History of Present Illness:     Chief Complaint: Acute chest pain    Qiana Childers is a 52 y.o. male with a pmh of disease s/p PCI 2021 and and recent PCI on 8/24/2022, ESRD on hemodialysis 3 times a week, insulin-dependent type 2 diabetes mellitus, history of Irene's gangrene, hypertension, hyperlipidemia presenting with chest tightness during dialysis. The tightness was on the left side, sharp, radiating to left arm with tingling in his fingers associated with lightheadedness. Per patient his blood pressure was low during the dialysis session. He had similar presentation in August where he had LHC and had MICAELA placed to mid LCx. Since then he has been taking aspirin and Plavix. Review of Systems: Need 10 Elements   Review of Systems   Constitutional:  Negative for activity change, appetite change and diaphoresis. HENT: Negative. Eyes: Negative. Respiratory:  Positive for chest tightness. Negative for shortness of breath. Cardiovascular:  Positive for chest pain. Gastrointestinal:  Negative for abdominal pain, diarrhea, nausea and vomiting. Endocrine: Negative. Genitourinary: Negative. Musculoskeletal: Negative. Neurological:  Negative for dizziness and headaches. Hematological: Negative. Psychiatric/Behavioral: Negative. Objective:   No intake or output data in the 24 hours ending 09/12/22 1510   Vitals:   Vitals:    09/12/22 1232 09/12/22 1303 09/12/22 1333 09/12/22 1402   BP: (!) 166/99 (!) 154/92 (!) 169/103 (!) 178/100   Pulse: 82 86 85 84   Resp: 13 13 14 16   Temp:       SpO2: 97% 98% 98%    Weight:       Height:           Medications Prior to Admission     Prior to Admission medications    Medication Sig Start Date End Date Taking?  Authorizing Provider   B Complex-C-Folic Acid (ALEX-JEFFERSON) TABS Take 1 tablet by mouth daily 8/9/22   Historical Provider, MD   methocarbamol (ROBAXIN) 500 MG tablet Take 500 mg by mouth nightly 8/31/22   Historical Provider, MD   sevelamer (RENVELA) 800 MG tablet Take 800 mg by mouth 3 times daily (with meals) 8/9/22   Historical Provider, MD   bacitracin 500 UNIT/GM ointment Apply topically 8/18/22   Historical Provider, MD   ranolazine (RANEXA) 500 MG extended release tablet Take 1 tablet by mouth 2 times daily 9/6/22   Prateek Lane MD   metoprolol tartrate (LOPRESSOR) 25 MG tablet Take 0.5 tablets by mouth 2 times daily 8/25/22   Maribeth Arce MD   aspirin 81 MG EC tablet Take 1 tablet by mouth daily 8/25/22   Maribeth Arce MD   clopidogrel (PLAVIX) 75 MG tablet Take 1 tablet by mouth daily 8/25/22   Maribeth Arce MD   oxyCODONE-acetaminophen (PERCOCET) 5-325 MG per tablet Take 1.5 tablets by mouth daily. Historical Provider, MD   traZODone (DESYREL) 50 MG tablet Take 1 tablet by mouth nightly as needed for Sleep 8/3/22   Cheli Martini MD   alogliptin (NESINA) 6.25 MG TABS tablet Take 1 tablet by mouth in the morning. 8/4/22   Cheli Martini MD   insulin lispro, 1 Unit Dial, (HUMALOG KWIKPEN) 100 UNIT/ML SOPN Inject 20 Units into the skin in the morning and 20 Units at noon and 20 Units in the evening. Inject before meals. Patient taking differently: Inject 35 Units into the skin 3 times daily (before meals) 8/3/22   Cheli Martini MD   insulin glargine (LANTUS SOLOSTAR) 100 UNIT/ML injection pen Inject 45 Units into the skin nightly  Patient taking differently: No sig reported 8/3/22   Cheli Martini MD   ARIPiprazole (ABILIFY) 5 MG tablet Take 1 tablet by mouth at bedtime 8/3/22   Cheli Martini MD   pantoprazole (PROTONIX) 40 MG tablet Take 1 tablet by mouth in the morning and 1 tablet in the evening. Take before meals.  8/3/22   Cheli Martini MD   sucralfate (CARAFATE) 1 GM tablet Take 1 tablet by mouth in the morning and 1 tablet in the evening. Take before meals. 8/3/22   Juan Ferrera MD   calcitRIOL (ROCALTROL) 0.25 MCG capsule Take 1 capsule by mouth in the morning. 8/4/22   Juan Ferrera MD   furosemide (LASIX) 40 MG tablet Take 1 tablet by mouth daily 9/22/21 8/3/22  Gilma Miles MD   insulin aspart (NOVOLOG) 100 UNIT/ML injection vial Inject 35 Units into the skin 3 times daily (before meals)  8/3/22  Historical Provider, MD   amLODIPine (NORVASC) 10 MG tablet Take 1 tablet by mouth daily 6/15/21 8/3/22  Yung Yanez MD   chlorthalidone (HYGROTON) 25 MG tablet Take 1 tablet by mouth daily 6/15/21 8/3/22  Yung Yanez MD   linagliptin (TRADJENTA) 5 MG tablet Take 1 tablet by mouth daily 3/29/21 8/3/22  Yung Yanez MD   ondansetron (ZOFRAN) 4 MG tablet Take 1 tablet by mouth every 8 hours as needed for Nausea or Vomiting 9/23/18   Annia Harley MD   Lancets MISC 1 each by Does not apply route daily 9/21/18   Annia Harley MD   glucose monitoring kit (FREESTYLE) monitoring kit 1 each by Does not apply route once for 1 dose. 6/20/13 6/20/13  Susan Robison MD       Physical Exam: Need 8 Elements   Physical Exam     General: NAD  Eyes: EOMI  ENT: neck supple  Cardiovascular: Regular rate. Respiratory: Clear to auscultation  Gastrointestinal: Soft, non tender  Genitourinary: no suprapubic tenderness  Musculoskeletal: No edema  Skin: warm, dry  Neuro: Alert. Psych: Mood appropriate.        Past Medical History:   PMHx   Past Medical History:   Diagnosis Date    Abscess 2010    scrotal    Acute renal failure (ARF) ([facility] 08/04/2019    Anxiety associated with depression     Anxiety associated with depression     Back pain 07/02/2012    Chest pain 05/01/2013    Diabetic nephropathy ([facility] [address] [facility]     Diabetic neuropathy ([facility] [address] [facility] 12/22/2011    Diabetic ulcer of left midfoot associated with type 2 diabetes mellitus, with fat layer exposed ([facility] [address] 07/18/2017    Diverticulosis     C scope +  Adam    DM (diabetes mellitus), type 2 ([facility] [address] 2002    DR. 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 Cassi Felix    Necrotizing fasciitis [facility])     Nose fracture 1988    Panic attacks     Pericarditis 2003    Hospitalized with s/p heart cath normal    Peripheral autonomic neuropathy due to diabetes mellitus ([facility] [address]     Axonal EMG- NCS, March 2011    Sebaceous cyst 09/01/2011    URI (upper respiratory infection) 02/27/2012    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 type 2 diabetes mellitus, with muscle involvement without evidence of necrosis ([facility] [address] 9/21/2021    WD-Wound, surgical, infected, initial encounter 12/08/2017    Wrist fracture 1986     PSHX:  has a past surgical history that includes Cardiac catheterization (2003, 2013); Tonsillectomy and adenoidectomy (1988); Upper gastrointestinal endoscopy (06/02/2011); Colonoscopy (06/02/2011); Nose surgery (1993); skin biopsy (N/A, 06/18/2013); other surgical history (Right, 05/22/2015); Toe amputation; Abdomen surgery; other surgical history (12/04/2017); pr deep incis foot bone infectn (Left, 09/22/2018); Upper gastrointestinal endoscopy (N/A, 01/12/2019); Toe amputation (Right, 03/23/2019); Toe amputation (Right, 08/06/2019); Scrotal surgery (N/A, 05/15/2021); Scrotal surgery (N/A, 05/16/2021); Foot surgery (Left, 12/21/2021); Upper gastrointestinal endoscopy (N/A, 07/26/2022); IR TUNNELED CVC PLACE WO SQ PORT/PUMP > 5 YEARS (07/27/2022); IR NONTUNNELED VASCULAR CATHETER > 5 YEARS (07/25/2022); and Coronary stent placement. Allergies:    Allergies   Allergen Reactions    Adhesive Tape Rash    Doxycycline Nausea And Vomiting    Reglan [Metoclopramide] Anxiety     Fam HX:  family history includes ADHD in his daughter; Bleeding Prob in his mother; Cancer in his mother; Diabetes in his father and sister; Heart Disease in his father; High Blood Pressure in his father, sister, and another family member; Kidney Disease in his father; Mental Illness in his sister and another family member; Obesity in his father and sister; Other in his son; Stroke in his mother. Soc HX:   Social History     Socioeconomic History    Marital status:    Tobacco Use    Smoking status: Former     Years: 20.00     Types: Cigarettes     Quit date: 2022     Years since quittin.1    Smokeless tobacco: Never    Tobacco comments:     Smokes when drinking/social   Vaping Use    Vaping Use: Never used   Substance and Sexual Activity    Alcohol use: Not Currently     Comment: occ    Drug use: Yes     Frequency: 7.0 times per week     Types: Marijuana Delona Members)     Comment: 21 @     Sexual activity: Yes     Partners: Female       Medications:   Medications:    aspirin  324 mg Oral Once      Infusions:   PRN Meds:      Labs      CBC:   Recent Labs     22  0950   WBC 8.4   HGB 9.6*        BMP:    Recent Labs     22  0700 22  0950    132*   K 4.6 3.9    97*   CO2 23 21   BUN 79* 45*   CREATININE 8.8* 5.3*   GLUCOSE 119* 154*     Hepatic:   Recent Labs     22  0950   AST 16   ALT 11   BILITOT 0.3   ALKPHOS 70     Lipids:   Lab Results   Component Value Date/Time    CHOL 145 2021 10:28 AM    CHOL 168 10/15/2013 10:30 AM    HDL 29 2021 10:28 AM    TRIG 188 2021 10:28 AM     Hemoglobin A1C:   Lab Results   Component Value Date/Time    LABA1C 7.1 2022 07:15 AM     TSH: No results found for: TSH  Troponin:   Lab Results   Component Value Date/Time    TROPONINT 0.119 2022 09:50 AM    TROPONINT 0.115 2022 07:12 AM    TROPONINT 0.096 2022 06:50 AM     Lactic Acid: No results for input(s): LACTA in the last 72 hours. BNP: No results for input(s): PROBNP in the last 72 hours.   UA:  Lab Results Component Value Date/Time    NITRU NEGATIVE 07/24/2022 11:40 PM    COLORU YELLOW 07/24/2022 11:40 PM    PHUR 6.5 06/20/2013 09:51 AM    WBCUA 2 07/24/2022 11:40 PM    RBCUA 1 07/24/2022 11:40 PM    MUCUS RARE 07/24/2022 11:40 PM    TRICHOMONAS NONE SEEN 07/24/2022 11:40 PM    BACTERIA NEGATIVE 07/24/2022 11:40 PM    CLARITYU CLEAR 07/24/2022 11:40 PM    SPECGRAV 1.020 07/24/2022 11:40 PM    LEUKOCYTESUR NEGATIVE 07/24/2022 11:40 PM    UROBILINOGEN 0.2 07/24/2022 11:40 PM    BILIRUBINUR NEGATIVE 07/24/2022 11:40 PM    BILIRUBINUR neg 06/20/2013 09:51 AM    BLOODU SMALL NUMBER OR AMOUNT OBSERVED 07/24/2022 11:40 PM    GLUCOSEU 1,000 06/20/2013 09:51 AM    KETUA NEGATIVE 07/24/2022 11:40 PM    AMORPHOUS RARE 07/24/2022 11:40 PM     Urine Cultures: No results found for: LABURIN  Blood Cultures: No results found for: BC  No results found for: BLOODCULT2  Organism:   Lab Results   Component Value Date/Time    ORG SAUR 01/07/2019 12:08 AM       Imaging/Diagnostics Last 24 Hours   XR CHEST PORTABLE    Result Date: 9/12/2022  EXAMINATION: ONE XRAY VIEW OF THE CHEST 9/12/2022 9:13 am COMPARISON: 07/25/2022 HISTORY: ORDERING SYSTEM PROVIDED HISTORY: cp at HD TECHNOLOGIST PROVIDED HISTORY: Reason for exam:->cp at HD Reason for Exam: cp at HD Additional signs and symptoms: cp at HD Relevant Medical/Surgical History: cp at HD FINDINGS: Low lung volumes. Right central venous catheter stable in positioning. No focal consolidation, pleural effusion or pneumothorax. The cardiomediastinal silhouette is stable. No overt pulmonary edema. The osseous structures are stable. No acute cardiopulmonary findings.          Electronically signed by Hunter Bonner MD on 9/12/2022 at 3:10 PM

## 2022-09-12 NOTE — ED NOTES
Medication History  [facility]    Patient Name: Buck Santizo 1975     Medication history has been completed by: Chuck Waddell CPhT    Source(s) of information: insurance claims and wife via phone     Primary Care Physician: Heydi Pichardo MD     Pharmacy: [address] [place] [address] as of 09/12/2022 - Fully Reviewed 09/12/2022   Allergen Reaction Noted    Adhesive tape Rash 10/15/2013    Doxycycline Nausea And Vomiting 07/09/2018    Reglan [metoclopramide] Anxiety 10/07/2018        Prior to Admission medications    Medication Sig Start Date End Date Taking? Authorizing Provider   B Complex-C-Folic Acid (ALEX-JEFFERSON) TABS Take 1 tablet by mouth daily 8/9/22   Historical Provider, MD   methocarbamol (ROBAXIN) 500 MG tablet Take 500 mg by mouth nightly 8/31/22   Historical Provider, MD   sevelamer (RENVELA) 800 MG tablet Take 800 mg by mouth 3 times daily (with meals) 8/9/22   Historical Provider, MD   bacitracin 500 UNIT/GM ointment Apply topically 8/18/22   Historical Provider, MD   ranolazine (RANEXA) 500 MG extended release tablet Take 1 tablet by mouth 2 times daily 9/6/22   Yemi Ramos MD   metoprolol tartrate (LOPRESSOR) 25 MG tablet Take 0.5 tablets by mouth 2 times daily 8/25/22   Kathi Blank MD   aspirin 81 MG EC tablet Take 1 tablet by mouth daily 8/25/22   Kathi Blank MD   clopidogrel (PLAVIX) 75 MG tablet Take 1 tablet by mouth daily 8/25/22   Kathi Blank MD   oxyCODONE-acetaminophen (PERCOCET) 5-325 MG per tablet Take 1.5 tablets by mouth daily. Historical Provider, MD   traZODone (DESYREL) 50 MG tablet Take 1 tablet by mouth nightly as needed for Sleep 8/3/22   Ivett Frazier MD   alogliptin (NESINA) 6.25 MG TABS tablet Take 1 tablet by mouth in the morning.  8/4/22   Ivett Frazier MD   insulin lispro, 1 Unit Dial, (HUMALOG KWIKPEN) 100 UNIT/ML SOPN Inject 35 Units into the skin 3 times daily (before meals) 8/3/22   Ivett Frazier MD   insulin glargine (LANTUS medication history is accurate as of 9/12/2022 9:50 AM.   Sherita Vazquez, CP   9/12/2022 9:50 AM

## 2022-09-13 LAB
ANION GAP SERPL CALCULATED.3IONS-SCNC: 14 MMOL/L (ref 4–16)
BASOPHILS ABSOLUTE: 0 K/CU MM
BASOPHILS RELATIVE PERCENT: 0.7 % (ref 0–1)
BUN BLDV-MCNC: 58 MG/DL (ref 6–23)
CALCIUM SERPL-MCNC: 7.6 MG/DL (ref 8.3–10.6)
CHLORIDE BLD-SCNC: 101 MMOL/L (ref 99–110)
CO2: 23 MMOL/L (ref 21–32)
CREAT SERPL-MCNC: 7.1 MG/DL (ref 0.9–1.3)
DIFFERENTIAL TYPE: ABNORMAL
EOSINOPHILS ABSOLUTE: 0.2 K/CU MM
EOSINOPHILS RELATIVE PERCENT: 3.9 % (ref 0–3)
GFR AFRICAN AMERICAN: 10 ML/MIN/1.73M2
GFR NON-AFRICAN AMERICAN: 8 ML/MIN/1.73M2
GLUCOSE BLD-MCNC: 118 MG/DL (ref 70–99)
GLUCOSE BLD-MCNC: 139 MG/DL (ref 70–99)
GLUCOSE BLD-MCNC: 150 MG/DL (ref 70–99)
GLUCOSE BLD-MCNC: 258 MG/DL (ref 70–99)
GLUCOSE BLD-MCNC: 95 MG/DL (ref 70–99)
HBV SURFACE AB TITR SER: <3.5 {TITER}
HCT VFR BLD CALC: 29.3 % (ref 42–52)
HEMOGLOBIN: 9.2 GM/DL (ref 13.5–18)
HEPATITIS B SURFACE ANTIGEN: NON REACTIVE
IMMATURE NEUTROPHIL %: 0.7 % (ref 0–0.43)
LYMPHOCYTES ABSOLUTE: 0.9 K/CU MM
LYMPHOCYTES RELATIVE PERCENT: 14.3 % (ref 24–44)
MCH RBC QN AUTO: 28.7 PG (ref 27–31)
MCHC RBC AUTO-ENTMCNC: 31.4 % (ref 32–36)
MCV RBC AUTO: 91.3 FL (ref 78–100)
MONOCYTES ABSOLUTE: 0.5 K/CU MM
MONOCYTES RELATIVE PERCENT: 8.4 % (ref 0–4)
NUCLEATED RBC %: 0 %
PDW BLD-RTO: 14.6 % (ref 11.7–14.9)
PLATELET # BLD: 272 K/CU MM (ref 140–440)
PMV BLD AUTO: 9.9 FL (ref 7.5–11.1)
POTASSIUM SERPL-SCNC: 4.1 MMOL/L (ref 3.5–5.1)
RBC # BLD: 3.21 M/CU MM (ref 4.6–6.2)
SEGMENTED NEUTROPHILS ABSOLUTE COUNT: 4.3 K/CU MM
SEGMENTED NEUTROPHILS RELATIVE PERCENT: 72 % (ref 36–66)
SODIUM BLD-SCNC: 138 MMOL/L (ref 135–145)
TOTAL IMMATURE NEUTOROPHIL: 0.04 K/CU MM
TOTAL NUCLEATED RBC: 0 K/CU MM
WBC # BLD: 5.9 K/CU MM (ref 4–10.5)

## 2022-09-13 PROCEDURE — G0378 HOSPITAL OBSERVATION PER HR: HCPCS

## 2022-09-13 PROCEDURE — 6360000002 HC RX W HCPCS

## 2022-09-13 PROCEDURE — 6360000002 HC RX W HCPCS: Performed by: INTERNAL MEDICINE

## 2022-09-13 PROCEDURE — 93458 L HRT ARTERY/VENTRICLE ANGIO: CPT | Performed by: INTERNAL MEDICINE

## 2022-09-13 PROCEDURE — 2580000003 HC RX 258: Performed by: INTERNAL MEDICINE

## 2022-09-13 PROCEDURE — 99215 OFFICE O/P EST HI 40 MIN: CPT | Performed by: INTERNAL MEDICINE

## 2022-09-13 PROCEDURE — 6370000000 HC RX 637 (ALT 250 FOR IP): Performed by: INTERNAL MEDICINE

## 2022-09-13 PROCEDURE — 6360000004 HC RX CONTRAST MEDICATION

## 2022-09-13 PROCEDURE — 87340 HEPATITIS B SURFACE AG IA: CPT

## 2022-09-13 PROCEDURE — 80048 BASIC METABOLIC PNL TOTAL CA: CPT

## 2022-09-13 PROCEDURE — 93458 L HRT ARTERY/VENTRICLE ANGIO: CPT

## 2022-09-13 PROCEDURE — 2500000003 HC RX 250 WO HCPCS

## 2022-09-13 PROCEDURE — 94761 N-INVAS EAR/PLS OXIMETRY MLT: CPT

## 2022-09-13 PROCEDURE — 2709999900 HC NON-CHARGEABLE SUPPLY

## 2022-09-13 PROCEDURE — 84484 ASSAY OF TROPONIN QUANT: CPT

## 2022-09-13 PROCEDURE — 86706 HEP B SURFACE ANTIBODY: CPT

## 2022-09-13 PROCEDURE — 86704 HEP B CORE ANTIBODY TOTAL: CPT

## 2022-09-13 PROCEDURE — 36415 COLL VENOUS BLD VENIPUNCTURE: CPT

## 2022-09-13 PROCEDURE — 93308 TTE F-UP OR LMTD: CPT

## 2022-09-13 PROCEDURE — 85025 COMPLETE CBC W/AUTO DIFF WBC: CPT

## 2022-09-13 PROCEDURE — 82962 GLUCOSE BLOOD TEST: CPT

## 2022-09-13 PROCEDURE — C1887 CATHETER, GUIDING: HCPCS

## 2022-09-13 RX ORDER — SODIUM CHLORIDE 0.9 % (FLUSH) 0.9 %
5-40 SYRINGE (ML) INJECTION PRN
Status: DISCONTINUED | OUTPATIENT
Start: 2022-09-13 | End: 2022-09-14 | Stop reason: HOSPADM

## 2022-09-13 RX ORDER — ACETAMINOPHEN 325 MG/1
650 TABLET ORAL EVERY 4 HOURS PRN
Status: DISCONTINUED | OUTPATIENT
Start: 2022-09-13 | End: 2022-09-14 | Stop reason: HOSPADM

## 2022-09-13 RX ORDER — SODIUM CHLORIDE 9 MG/ML
INJECTION, SOLUTION INTRAVENOUS PRN
Status: DISCONTINUED | OUTPATIENT
Start: 2022-09-13 | End: 2022-09-14 | Stop reason: HOSPADM

## 2022-09-13 RX ORDER — AMLODIPINE BESYLATE 10 MG/1
10 TABLET ORAL DAILY
Status: DISCONTINUED | OUTPATIENT
Start: 2022-09-13 | End: 2022-09-14 | Stop reason: HOSPADM

## 2022-09-13 RX ORDER — CARVEDILOL 6.25 MG/1
12.5 TABLET ORAL 2 TIMES DAILY WITH MEALS
Status: DISCONTINUED | OUTPATIENT
Start: 2022-09-13 | End: 2022-09-14 | Stop reason: HOSPADM

## 2022-09-13 RX ORDER — OXYCODONE HYDROCHLORIDE AND ACETAMINOPHEN 5; 325 MG/1; MG/1
2 TABLET ORAL EVERY 4 HOURS PRN
Status: DISCONTINUED | OUTPATIENT
Start: 2022-09-13 | End: 2022-09-14 | Stop reason: HOSPADM

## 2022-09-13 RX ORDER — OXYCODONE HYDROCHLORIDE AND ACETAMINOPHEN 5; 325 MG/1; MG/1
1 TABLET ORAL EVERY 4 HOURS PRN
Status: DISCONTINUED | OUTPATIENT
Start: 2022-09-13 | End: 2022-09-14 | Stop reason: HOSPADM

## 2022-09-13 RX ORDER — SODIUM CHLORIDE 0.9 % (FLUSH) 0.9 %
5-40 SYRINGE (ML) INJECTION EVERY 12 HOURS SCHEDULED
Status: DISCONTINUED | OUTPATIENT
Start: 2022-09-13 | End: 2022-09-14 | Stop reason: HOSPADM

## 2022-09-13 RX ADMIN — SEVELAMER CARBONATE 800 MG: 800 TABLET, FILM COATED ORAL at 12:29

## 2022-09-13 RX ADMIN — CLOPIDOGREL BISULFATE 75 MG: 75 TABLET ORAL at 09:16

## 2022-09-13 RX ADMIN — SODIUM CHLORIDE, PRESERVATIVE FREE 10 ML: 5 INJECTION INTRAVENOUS at 09:17

## 2022-09-13 RX ADMIN — OXYCODONE HYDROCHLORIDE AND ACETAMINOPHEN 1 TABLET: 5; 325 TABLET ORAL at 09:18

## 2022-09-13 RX ADMIN — ARIPIPRAZOLE 5 MG: 5 TABLET ORAL at 21:05

## 2022-09-13 RX ADMIN — SODIUM CHLORIDE, PRESERVATIVE FREE 10 ML: 5 INJECTION INTRAVENOUS at 21:05

## 2022-09-13 RX ADMIN — HEPARIN SODIUM 5000 UNITS: 5000 INJECTION INTRAVENOUS; SUBCUTANEOUS at 21:05

## 2022-09-13 RX ADMIN — INSULIN GLARGINE 60 UNITS: 100 INJECTION, SOLUTION SUBCUTANEOUS at 21:06

## 2022-09-13 RX ADMIN — INSULIN LISPRO 25 UNITS: 100 INJECTION, SOLUTION INTRAVENOUS; SUBCUTANEOUS at 12:26

## 2022-09-13 RX ADMIN — ATORVASTATIN CALCIUM 40 MG: 40 TABLET, FILM COATED ORAL at 21:05

## 2022-09-13 RX ADMIN — HEPARIN SODIUM 5000 UNITS: 5000 INJECTION INTRAVENOUS; SUBCUTANEOUS at 17:21

## 2022-09-13 RX ADMIN — PERFLUTREN 2.2 MG: 6.52 INJECTION, SUSPENSION INTRAVENOUS at 10:59

## 2022-09-13 RX ADMIN — OXYCODONE AND ACETAMINOPHEN 2 TABLET: 5; 325 TABLET ORAL at 18:10

## 2022-09-13 RX ADMIN — OXYCODONE AND ACETAMINOPHEN 2 TABLET: 5; 325 TABLET ORAL at 23:02

## 2022-09-13 RX ADMIN — ASPIRIN 81 MG: 81 TABLET, COATED ORAL at 09:16

## 2022-09-13 RX ADMIN — INSULIN LISPRO 25 UNITS: 100 INJECTION, SOLUTION INTRAVENOUS; SUBCUTANEOUS at 18:13

## 2022-09-13 RX ADMIN — SEVELAMER CARBONATE 800 MG: 800 TABLET, FILM COATED ORAL at 17:21

## 2022-09-13 RX ADMIN — RANOLAZINE 500 MG: 500 TABLET, EXTENDED RELEASE ORAL at 21:05

## 2022-09-13 RX ADMIN — SEVELAMER CARBONATE 800 MG: 800 TABLET, FILM COATED ORAL at 10:08

## 2022-09-13 RX ADMIN — PANTOPRAZOLE SODIUM 40 MG: 40 TABLET, DELAYED RELEASE ORAL at 17:21

## 2022-09-13 RX ADMIN — INSULIN LISPRO 8 UNITS: 100 INJECTION, SOLUTION INTRAVENOUS; SUBCUTANEOUS at 12:26

## 2022-09-13 RX ADMIN — METOPROLOL TARTRATE 12.5 MG: 25 TABLET, FILM COATED ORAL at 09:16

## 2022-09-13 RX ADMIN — RANOLAZINE 500 MG: 500 TABLET, EXTENDED RELEASE ORAL at 09:16

## 2022-09-13 RX ADMIN — CALCITRIOL CAPSULES 0.25 MCG 0.25 MCG: 0.25 CAPSULE ORAL at 09:16

## 2022-09-13 RX ADMIN — ACETAMINOPHEN 650 MG: 325 TABLET ORAL at 17:21

## 2022-09-13 RX ADMIN — TRAZODONE HYDROCHLORIDE 50 MG: 50 TABLET ORAL at 23:02

## 2022-09-13 RX ADMIN — HEPARIN SODIUM 5000 UNITS: 5000 INJECTION INTRAVENOUS; SUBCUTANEOUS at 06:10

## 2022-09-13 RX ADMIN — CARVEDILOL 12.5 MG: 6.25 TABLET, FILM COATED ORAL at 17:21

## 2022-09-13 RX ADMIN — AMLODIPINE BESYLATE 10 MG: 10 TABLET ORAL at 10:08

## 2022-09-13 RX ADMIN — CARVEDILOL 12.5 MG: 6.25 TABLET, FILM COATED ORAL at 10:08

## 2022-09-13 RX ADMIN — MULTIPLE VITAMINS W/ MINERALS TAB 1 TABLET: TAB at 21:05

## 2022-09-13 RX ADMIN — PANTOPRAZOLE SODIUM 40 MG: 40 TABLET, DELAYED RELEASE ORAL at 06:10

## 2022-09-13 RX ADMIN — METHOCARBAMOL 500 MG: 500 TABLET ORAL at 21:05

## 2022-09-13 ASSESSMENT — PAIN - FUNCTIONAL ASSESSMENT
PAIN_FUNCTIONAL_ASSESSMENT: ACTIVITIES ARE NOT PREVENTED

## 2022-09-13 ASSESSMENT — PAIN DESCRIPTION - DESCRIPTORS
DESCRIPTORS: ACHING
DESCRIPTORS: SHARP
DESCRIPTORS: ACHING;DISCOMFORT

## 2022-09-13 ASSESSMENT — PAIN DESCRIPTION - ORIENTATION
ORIENTATION: RIGHT
ORIENTATION: RIGHT
ORIENTATION: RIGHT;LEFT

## 2022-09-13 ASSESSMENT — PAIN SCALES - GENERAL
PAINLEVEL_OUTOF10: 10
PAINLEVEL_OUTOF10: 10
PAINLEVEL_OUTOF10: 9
PAINLEVEL_OUTOF10: 3
PAINLEVEL_OUTOF10: 8
PAINLEVEL_OUTOF10: 8

## 2022-09-13 ASSESSMENT — PAIN DESCRIPTION - LOCATION
LOCATION: LEG;FOOT
LOCATION: WRIST
LOCATION: WRIST

## 2022-09-13 NOTE — PROCEDURES
PATIENT COMPLETED A 3 HOUR HD TREATMENT, 2.0L OF FLUID WAS REMOVED. RIGHT TUNNELED CVC WAS USED FOR ACCESS, STERILE DRESSING CHANGE COMPLETED. POST TREATMENT LUMENS WERE FLUSHED AND CAPPED X2.  NO MEDS GIVEN WITH TREATMENT. TREATMENT OVERSEEN BY:  ROSS IQBAL RN    Patient Name: Linda Salcido  Patient : 1975  MRN: [id number]     Acct: [de-identified]  Date of Admission: 2022  Room/Bed: 3107/3107-A  Code Status:  Full Code  Allergies:    Allergies   Allergen Reactions    Adhesive Tape Rash    Doxycycline Nausea And Vomiting    Reglan [Metoclopramide] Anxiety     Diagnosis:    Patient Active Problem List   Diagnosis    Hiatal hernia    Diabetes mellitus type 2, improved controlled    Diabetic neuropathy (HCC)    Panic attack    Anxiety associated with depression    Neck pain    DANIELA (obstructive sleep apnea)    Urinary frequency    Bilateral carpal tunnel syndrome- left worse than right    Hyperlipidemia with target LDL less than 100    Essential hypertension    Bronchitis    Osteomyelitis (HCC)    Abscess    Periumbilical hernia    Sepsis (HCC)    Cellulitis of left foot    Constipation    Intractable nausea and vomiting    Uncontrolled type 2 diabetes mellitus (HCC)    Nausea and vomiting    Diabetic foot infection (HCC)    Acute renal failure (ARF) (HCC)    Cellulitis    Cellulitis of left arm    Cellulitis, scrotum    Acute epididymitis    Irene gangrene    Recurrent major depressive disorder, in full remission ([address]    Generalized anxiety disorder    Morbid obesity with body mass index (BMI) of 40.0 to 44.9 in [facility])    Necrotizing fasciitis (HCC)    Syncope    Right groin wound    Ulcer of right groin with fat layer exposed ([address]    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 type 2 diabetes mellitus, with muscle involvement without evidence of necrosis (HCC)    Nephrotic syndrome    Generalized edema    Stage 3b chronic kidney disease (HCC)    Diabetic nephropathy associated with type 2 diabetes mellitus ([facility])    Hypoalbuminemia    Chronic kidney disease, stage IV (severe) ([facility])    FH: CAD (coronary artery disease)    ASCVD (arteriosclerotic cardiovascular disease)    Other proteinuria    Chest pain    Surgical wound dehiscence    CARMEN (acute kidney injury) ([facility])    Anemia    Chest tightness    NSTEMI (non-ST elevated myocardial infarction) ([facility]    WD - Ulcer of forefoot due to type 2 diabetes mellitus ([facility])    Acute chest pain    ESRD (end stage renal disease) ([facility]         Treatment:  Hemodilaysis 2:1  Priority: Routine  Location: Acute Room    Diabetic: Yes  NPO: No  Isolation Precautions: Dialysis     Consent for Treatment Verified: Yes  Blood Consent Verified: Not Applicable     Safety Verified: Identify (I), Consent (C), Equipment (E), HepB Status (B), Orders Complete (O), Access Verified (A), and Timeliness (T)  Time out performed prior to access at 1319 hours. Report Received from Primary RN at 1228 hours. Primary RN (First Initial, Last Name, Title): STACY SAUCEDO RN  Incapacitated Nurse Education Completed: Not Applicable     HBsAg ONLY:  Date Drawn: September 13, 2022       Results: Negative  HBsAb:  Date Drawn:  September 13, 2022       Results: Susceptible <10    Order  Dialysis Bath  K+ (Potassium): 3  Ca+ (Calcium): 2.5  Na+ (Sodium): 138  HCO3 (Bicarb): 30  Bicarbonate Concentrate Lot No.: [id number]  Acid Concentrate Lot No.: [id number]     Na+ Modeling: Not Applicable  Dialyzer: [id number]  Dialysate Temperature (C):  35  Blood Flow Rate (BFR):  300   Dialysate Flow Rate (DFR):   500        Access to be Utilized   Access:  Tunneled Catheter  Location: Internal Jugular  Side: Right   First Use X-ray Verified: Not Applicable  OK to use line order: Yes    Site Assessment:  Signs and Symptoms of Infection/Inflammation: None  If yes: Not Applicable  Dressing: Dry and Intact  Site Prep: Medical Aseptic Technique  Dressing Changed this Treatment: Yes  If yes, by whom: Silvina Bruner RN  Date of Last Dressing Change: Not Applicable  Antimicrobial Patch in place?: Yes  Blue Caps in place?: Yes  Gauze Dressing?: No  Non Dialysis Use?: No  Comment:    Flows: Good, Patent  If access problem, who was notified:     Pre and Post-Assessment  Patient Vitals for the past 8 hrs:   Level of Consciousness O2 Device Pain Level Response to Pain Intervention   09/13/22 0911 0 None (Room air) -- --   09/13/22 0918 -- -- 10 --   09/13/22 1211 0 -- -- --   09/13/22 1244 -- -- -- Patient satisfied     Labs  Recent Labs     09/12/22  0950   WBC 8.4   HGB 9.6*   HCT 29.9*                                                                     Recent Labs     09/12/22  0700 09/12/22  0950 09/13/22  1149    132* 138   K 4.6 3.9 4.1    97* 101   CO2 23 21 23   BUN 79* 45* 58*   CREATININE 8.8* 5.3* 7.1*   GLUCOSE 119* 154* 150*     IV Drips and Rate/Dose   sodium chloride      dextrose        Safety - Before each treatment:   Dialysis Machine No.: [id number]   Machine Number: [id number]  Dialyzer Lot No.: [id number]  RO Machine Log Sheet Completed: Yes  Machine Alarm Self Test: Completed; Passed (09/13/22 1319)     Air Foam Detector: Tested, Proper Function  Extracorporeal Circuit Tested for Integrity: Yes  Machine Conductivity: 14.0  Manual Conductivity: 14.2     Bicarbonate Concentrate Lot No.: [id number]  Acid Concentrate Lot No.: [id number]  Manual Ph: 7.4  Bleach Test (Neg): No  Bath Temperature: 95 °F (35 °C)  Tubing Lot#: [id number]  Conductivity Meter Serial #: [id number]  All Connections Secure?: Yes  Venous Parameters Set?: Yes  Arterial Parameters Set?: Yes  Saline Line Double Clamped?: Yes  Air Foam Detector Engaged?: Yes  Machine Functioning Alarm Free?  Yes  Prime Given: 200ml    Chlorine Testing - Before each treatment and every 4 hours:   Treatment  Treatment Number: 1  Time On: 1326  Time Off: 1630  Treatment Goal: 3 HOUR, 3.0L  Weight: (!) 305 lb 1.9 oz (138.4 kg) (09/13/22 1630)  1st check: less than 0.1 ppm at: 1035 hours  2nd check: less than 0.1 ppm at: 1415 hours  3rd check: Not Applicable  (if greater than 0.1 ppm, then check every 30 minutes from secondary)    Access Flows and Pressures  Patient Vitals for the past 8 hrs:   Blood Flow Rate (ml/min) Ultrafiltration Rate (ml/hr) Arterial Pressure (mmHg) Venous Pressure (mmHg) TMP DFR Comments Access Visible   09/13/22 1326 300 ml/min 1000 ml/hr -70 mmHg 90 60 500 TX STARTED, PRIME GIVEN, LINES SECURE AND CALL LIGHT WITHIN REACH Yes   09/13/22 1330 300 ml/min 1000 ml/hr -80 mmHg 80 70 500 RESTING WITH EYES CLOSED Yes   09/13/22 1345 300 ml/min 1000 ml/hr -80 mmHg 100 60 500 RESTING WITH EYES CLOSED Yes   09/13/22 1400 300 ml/min 1000 ml/hr -80 mmHg 100 60 500 AWAKE AND ALERT Yes   09/13/22 1415 300 ml/min 1000 ml/hr -100 mmHg 100 70 500 pt.  resting, stable, decreased uf goal to 2500 due to drop in BP Yes   09/13/22 1430 300 ml/min 750 ml/hr -100 mmHg 100 70 500 RESTINGN WITH EYES CLOSED Yes   09/13/22 1445 300 ml/min 750 ml/hr -100 mmHg 100 70 500 NO CHANGE, RESTING Yes   09/13/22 1500 300 ml/min 750 ml/hr -100 mmHg 100 50 500 RESTING WITH EYES CLOSED Yes   09/13/22 1515 300 ml/min 750 ml/hr -100 mmHg 100 50 500 AWAKE AND ALERT Yes   09/13/22 1530 300 ml/min 750 ml/hr -100 mmHg 100 50 500 RESTING WITH EYES CLOSED Yes   09/13/22 1545 300 ml/min 750 ml/hr -110 mmHg 100 60 500 RESTINGWI TH EYES CLOSED Yes   09/13/22 1600 300 ml/min 750 ml/hr -110 mmHg 110 50 500 RESTING WITH EYES CLOSED Yes   09/13/22 1615 300 ml/min 750 ml/hr -110 mmHg 110 50 500 NO CHANGE RESTING Yes   09/13/22 1630 200 ml/min 0 ml/hr -20 mmHg 60 50 500 TX ENDED, RINSEBACK GIVEN Yes     Vital Signs  Patient Vitals for the past 8 hrs:   BP Temp Pulse Resp SpO2 Weight Weight Method Percent Weight Change   09/13/22 0911 (!) 192/155 97.7 °F (36.5 °C) 84 17 98 % -- -- --   09/13/22 0916 (!) 159/83 -- 79 -- -- -- -- --   09/13/22 0918 -- -- -- 17 -- -- -- --   09/13/22 0948 -- -- -- 15 -- -- -- --   09/13/22 1008 (!) 146/86 -- 77 -- -- -- -- --   09/13/22 1211 -- 97.7 °F (36.5 °C) -- -- -- -- -- --   09/13/22 1326 (!) 160/65 98 °F (36.7 °C) 78 16 100 % (!) 309 lb 4.9 oz (140.3 kg) Bed scale 0   09/13/22 1330 (!) 145/93 -- 77 14 -- -- -- --   09/13/22 1345 (!) 143/84 -- 78 13 -- -- -- --   09/13/22 1400 (!) 140/82 -- 78 15 -- -- -- --   09/13/22 1415 (!) 104/43 -- 75 17 -- -- -- --   09/13/22 1430 (!) 101/45 -- 77 14 -- -- -- --   09/13/22 1445 114/73 -- 77 14 -- -- -- --   09/13/22 1500 115/76 -- 78 14 -- -- -- --   09/13/22 1515 107/76 -- 76 14 -- -- -- --   09/13/22 1530 111/69 -- 76 14 -- -- -- --   09/13/22 1545 105/76 -- 77 15 -- -- -- --   09/13/22 1600 114/84 -- 76 12 -- -- -- --   09/13/22 1615 90/63 -- 78 15 -- -- -- --   09/13/22 1630 108/74 98 °F (36.7 °C) 76 10 -- (!) 305 lb 1.9 oz (138.4 kg) -- 0     Post-Dialysis  Arterial Catheter Locking Solution:  1.9 ML NS  Venous Catheter Locking Solution:  1.9 ML NS  Post-Treatment Procedures: Blood returned, Catheter Capped, clamped with Saline x2 ports  Machine Disinfection Process: Heat Disinfect, Exterior Machine Disinfection  Rinseback Volume (ml): 200 ml  Blood Volume Processed (Liters): 49.2 l/min  Dialyzer Clearance: Moderately streaked  Duration of Treatment (minutes): 180 minutes     Hemodialysis Intake (ml): 500 ml  Hemodialysis Output (ml): 2500 ml     Tolerated Treatment: Good  Patient Response to Treatment: Tonio Trotter 177  Physician Notified: No       Provider Notification        Handoff complete and report given to Primary RN at 1700 hours.   Primary RN (First Initial, Last Name, Title):  STACY SAUCEDO RN     Education  Person Educated: Patient   Knowledge Base: None  Barriers to Learning?: None  Preferred method of Learning: Oral  Topic(s): Access Care and Procedural   Teaching Tools: Explanation   Response to Education: Verbalized Understanding     Electronically signed by Anjelica Tejeda on 9/13/2022 at 5:10 PM

## 2022-09-13 NOTE — PROGRESS NOTES
initial encounter, and Wrist fracture. Past surgical history:   has a past surgical history that includes Cardiac catheterization (2003, 2013); Tonsillectomy and adenoidectomy (1988); Upper gastrointestinal endoscopy (06/02/2011); Colonoscopy (06/02/2011); Nose surgery (1993); skin biopsy (N/A, 06/18/2013); other surgical history (Right, 05/22/2015); Toe amputation; Abdomen surgery; other surgical history (12/04/2017); pr deep incis foot bone infectn (Left, 09/22/2018); Upper gastrointestinal endoscopy (N/A, 01/12/2019); Toe amputation (Right, 03/23/2019); Toe amputation (Right, 08/06/2019); Scrotal surgery (N/A, 05/15/2021); Scrotal surgery (N/A, 05/16/2021); Foot surgery (Left, 12/21/2021); Upper gastrointestinal endoscopy (N/A, 07/26/2022); IR TUNNELED CVC PLACE WO SQ PORT/PUMP > 5 YEARS (07/27/2022); IR NONTUNNELED VASCULAR CATHETER > 5 YEARS (07/25/2022); and Coronary stent placement. Social History:   reports that he quit smoking about 2 months ago. His smoking use included cigarettes. He has never used smokeless tobacco. He reports that he does not currently use alcohol. He reports current drug use. Frequency: 7.00 times per week. Drug: Marijuana Charmayne Staquin). Family history:  family history includes ADHD in his daughter; Bleeding Prob in his mother; Cancer in his mother; Diabetes in his father and sister; Heart Disease in his father; High Blood Pressure in his father, sister, and another family member; Kidney Disease in his father; Mental Illness in his sister and another family member; Obesity in his father and sister; Other in his son; Stroke in his mother.     Allergies   Allergen Reactions    Adhesive Tape Rash    Doxycycline Nausea And Vomiting    Reglan [Metoclopramide] Anxiety       Review of Systems:    All 14 systems were reviewed and are negative  Except for the positive findings  which as documented     /84   Pulse 76   Temp 98 °F (36.7 °C)   Resp 12   Ht 5' 9\" (1.753 m)   Wt (!) 309 lb 4.9 oz (140.3 kg)   SpO2 100%   BMI 45.68 kg/m²     Intake/Output Summary (Last 24 hours) at 9/13/2022 1608  Last data filed at 9/12/2022 2042  Gross per 24 hour   Intake --   Output 275 ml   Net -275 ml     Physical Exam:  Constitutional:  Well developed, Well nourished, No acute distress, Non-toxic appearance. HENT:  Normocephalic, Atraumatic, Bilateral external ears normal, Oropharynx moist, No oral exudates, Nose normal. Neck- Normal range of motion, No tenderness, Supple, No stridor. Eyes:  PERRL, EOMI, Conjunctiva normal, No discharge. Respiratory:  Normal breath sounds, No respiratory distress, No wheezing, No chest tenderness. Cardiovascular:  Normal heart rate, Normal rhythm, No murmurs, No rubs, No gallops, JVP not elevated  Abdomen/GI:  Bowel sounds normal, Soft, No tenderness, No masses, No pulsatile masses. Musculoskeletal:  Intact distal pulses, No edema, No tenderness, No cyanosis, No clubbing. Good range of motion in all major joints. No tenderness to palpation or major deformities noted. Back- No tenderness. Integument:  Warm, Dry, No erythema, No rash. Lymphatic:  No lymphadenopathy noted. Neurologic:  Alert & oriented x 3, Normal motor function, Normal sensory function, No focal deficits noted.    Psychiatric:  Affect  and  Mood :no change    Medications:    sodium chloride flush  5-40 mL IntraVENous 2 times per day    carvedilol  12.5 mg Oral BID WC    amLODIPine  10 mg Oral Daily    aspirin  324 mg Oral Once    ARIPiprazole  5 mg Oral Nightly    aspirin  81 mg Oral Daily    atorvastatin  40 mg Oral Nightly    calcitRIOL  0.25 mcg Oral Daily    clopidogrel  75 mg Oral Daily    insulin glargine  60 Units SubCUTAneous Nightly    insulin lispro  25 Units SubCUTAneous TID AC    methocarbamol  500 mg Oral Nightly    pantoprazole  40 mg Oral BID AC    ranolazine  500 mg Oral BID    sevelamer  800 mg Oral TID WC    heparin (porcine)  5,000 Units SubCUTAneous 3 times per day    insulin lispro  0-16 Units SubCUTAneous TID     insulin lispro  0-4 Units SubCUTAneous Nightly    multivitamin  1 tablet Oral Daily      sodium chloride      dextrose       sodium chloride flush, sodium chloride, acetaminophen, oxyCODONE-acetaminophen, traZODone, ondansetron **OR** ondansetron, polyethylene glycol, glucose, dextrose bolus **OR** dextrose bolus, glucagon (rDNA), dextrose    Lab Data:  CBC:   Recent Labs     09/12/22  0950   WBC 8.4   HGB 9.6*   HCT 29.9*   MCV 90.1        BMP:   Recent Labs     09/12/22  0700 09/12/22  0950 09/13/22  1149    132* 138   K 4.6 3.9 4.1    97* 101   CO2 23 21 23   BUN 79* 45* 58*   CREATININE 8.8* 5.3* 7.1*     PT/INR: No results for input(s): PROTIME, INR in the last 72 hours. BNP:  No results for input(s): PROBNP in the last 72 hours. TROPONIN:   Recent Labs     09/12/22  0950 09/12/22 2052   TROPONINT 0.119* 0.156*        ECHO :   echocardiogram    Assessment:  52 y. o.year old who is admitted for  Chief Complaint   Patient presents with    Chest Pain     Chest pain that started at dialysis      , active issues as noted below:  Impression:  Principal Problem:    Acute chest pain  Active Problems:    ESRD (end stage renal disease) ([facility]    Diabetes mellitus type 2, improved controlled    ASCVD (arteriosclerotic cardiovascular disease)  Resolved Problems:    * No resolved hospital problems. *            All labs, medications and tests reviewed by myself , continue all other medications of all above medical condition listed as is except for changes mentioned above. Thank you very much for consult , please call with questions.     Dayton Allred MD, MD 9/13/2022 4:08 PM

## 2022-09-13 NOTE — PROGRESS NOTES
Hospitalist Progress Note      PCP: Negar Huntley MD    Date of Admission: 2022    Chief Complaint on Admission: chest pain    Pt Seen/Examined and Chart Reviewed. Admitting dx chest pain, NSTEMI    SUBJECTIVE:     Chest pain 5/10, improved but not gone, no SOB, 's this am      OBJECTIVE:   Vitals    TEMPERATURE:  Current - Temp: 98 °F (36.7 °C); Max - Temp  Av.9 °F (36.6 °C)  Min: 97.6 °F (36.4 °C)  Max: 98.2 °F (36.8 °C)  RESPIRATIONS RANGE: Resp  Avg: 15.4  Min: 10  Max: 22  PULSE RANGE: Pulse  Av.7  Min: 74  Max: 90  BLOOD PRESSURE RANGE:  Systolic (45BJA), BYD:579 , Min:90 , IMM:250   ; Diastolic (86JKN), ZTW:48, Min:43, Max:155    PULSE OXIMETRY RANGE: SpO2  Av.7 %  Min: 95 %  Max: 100 %  24HR INTAKE/OUTPUT:    Intake/Output Summary (Last 24 hours) at 2022 1648  Last data filed at 2022 1630  Gross per 24 hour   Intake 500 ml   Output 2775 ml   Net -2275 ml       Exam:    General appearance: Well, no apparent distress, appears stated age and cooperative. HEENT Normal cephalic, atraumatic without obvious deformity. Pupils equal, round, and reactive to light. Extra ocular muscles intact. Conjunctivae/corneas clear. Neck: Supple, No jugular venous distention/bruits. Trachea midline without thyromegaly or adenopathy with full range of motion. Lungs: Clear to ascultation, bilaterally without Rales/Wheezes/Rhonchi with good respiratory effort. Heart: Regular rate and rhythm with Normal S1/S2 without  murmurs, rubs or gallops, point of maximum impulse non-displaced  Abdomen: Soft, non-tender or non-distended without rigidity or guarding and positive bowel sounds all four quadrants. Extremities: No clubbing, cyanosis, or edema bilaterally. Full range of motion without deformity and normal gait intact. Skin: Skin color, texture, turgor normal. No rashes or lesions.   Neurologic: Alert and oriented X 3,  neurovascularly intact with sensory/motor intact upper extremities/lower extremities, bilaterally. Cranial nerves:II-XII intact, grossly non-focal.  Mental status: Alert, oriented, thought content appropriate. Data    Recent Labs     09/12/22  0950   WBC 8.4   HGB 9.6*   HCT 29.9*         Recent Labs     09/12/22  0700 09/12/22  0950 09/13/22  1149    132* 138   K 4.6 3.9 4.1    97* 101   CO2 23 21 23   BUN 79* 45* 58*   CREATININE 8.8* 5.3* 7.1*     Recent Labs     09/12/22  0950   AST 16   ALT 11   BILITOT 0.3   ALKPHOS 70     No results for input(s): INR in the last 72 hours. No results for input(s): CKTOTAL, CKMB, CKMBINDEX, TROPONINI in the last 72 hours.     Imaging/Test Results    [facility] neg with patent stents  Hb 9.6  Bmi 44          Consults:     IP CONSULT TO NEPHROLOGY  IP CONSULT TO CARDIOLOGY  IP CONSULT TO HOSPITALIST  IP CONSULT TO SOCIAL WORK  IP CONSULT TO CARDIOLOGY  IP CONSULT TO DIETITIAN    Allergies  Adhesive tape, Doxycycline, and Reglan [metoclopramide]    Medications      Scheduled Meds:   sodium chloride flush  5-40 mL IntraVENous 2 times per day    carvedilol  12.5 mg Oral BID WC    amLODIPine  10 mg Oral Daily    aspirin  324 mg Oral Once    ARIPiprazole  5 mg Oral Nightly    aspirin  81 mg Oral Daily    atorvastatin  40 mg Oral Nightly    calcitRIOL  0.25 mcg Oral Daily    clopidogrel  75 mg Oral Daily    insulin glargine  60 Units SubCUTAneous Nightly    insulin lispro  25 Units SubCUTAneous TID AC    methocarbamol  500 mg Oral Nightly    pantoprazole  40 mg Oral BID AC    ranolazine  500 mg Oral BID    sevelamer  800 mg Oral TID WC    heparin (porcine)  5,000 Units SubCUTAneous 3 times per day    insulin lispro  0-16 Units SubCUTAneous TID WC    insulin lispro  0-4 Units SubCUTAneous Nightly    multivitamin  1 tablet Oral Daily       Infusions:   sodium chloride      dextrose         PRN Meds:  sodium chloride flush, sodium chloride, acetaminophen, oxyCODONE-acetaminophen, traZODone, ondansetron **OR** ondansetron, polyethylene glycol, glucose, dextrose bolus **OR** dextrose bolus, glucagon (rDNA), dextrose    ASSESSMENT AND PLAN      Active Hospital Problems    Diagnosis Date Noted    Acute chest pain [R07.9] 09/12/2022     Priority: Medium    ESRD (end stage renal disease) ([facility] [N18.6] 09/12/2022     Priority: Medium    ASCVD (arteriosclerotic cardiovascular disease) [I25.10] 09/29/2021    Diabetes mellitus type 2, improved controlled [E11.65] 02/04/2011       Dante Wakefield is a 52 y.o. male with a pmh of disease s/p PCI 2021 and and recent PCI on 8/24/2022, ESRD on hemodialysis 3 times a week, insulin-dependent type 2 diabetes mellitus, history of Irene's gangrene, hypertension, hyperlipidemia presenting with chest tightness during dialysis     Hospital Problems               Last Modified POA     * (Principal) Acute chest pain 9/12/2022 Yes      Chest tightness  -Likely due to demand ischemia during dialysis  -Per patient was hypotensive during dialysis session  -Troponins in the ED 0.119  -EKG, normal sinus rhythm, no ST-T wave changes concerning for ACS  -Cardiology on board  -Continue Ranexa  LHC today with patent stents, no intervention  Echo pending  Ongoing chest pain possibly related to uncontrolled HTN, 's today, monitor BP on coreg and norvasc     NSTEMI  Likely due to demand ischemia  Initial troponin 0.119  Cycle troponins x2     Coronary artery disease s/p PCI on 8/24/2022 and  in 2021  On aspirin and Plavix, compliant  Not taking atorvastatin, will resume  LHC on 8/24/2022 revealed 90% stenosis mid circumflex, which was stented with KELLY-III flow, otherwise no hemodynamically significant disease  Cardiology has been consulted from ED  Stents patent on LHC     ESRD  3 times a week HD  Nephrology on board  HD today after LHC     History of anemia  Due to ESRD  Patient on iron infusion during dialysis  Hemoglobin on presentation 9.6G/DL     Hypertension  On amlodipine, metoprolol, resumed     Type 2 diabetes mellitus, insulin-dependent  Takes Lantus 75 units at night and 35 units 3 times a day  Resume add Lantus 60 units at night and 25 units 3 times a day  POCT blood glucose 4 times daily  Titrate insulin as able  Hypoglycemia protocol     Anxiety and depression  On trazodone, Abilify             PT/OT Eval Status: deferred    DVT Prophylaxis: SQ heparin  Diet: ADULT DIET; Regular; Low Fat/Low Chol/High Fiber/ERLIN; Low Sodium (2 gm);  Low Potassium (Less than 3000 mg/day); 60 to 80 gm; 1000 ml  Code Status: Full Code      Dispo - LHC today neg, HD today after LHC, echo pending, 's, restart home BP meds, BP control, likely d/c tomorrow, f/u cardio and Juanita Rosa MD

## 2022-09-13 NOTE — CONSULTS
Nephrology Service Consultation      [address], [address] [address]  Phone: [phone]  Office Hours: 8:30AM - 4:30PM  Monday - Friday        MEDICAL DECISION MAKING and Recommendations     Chest pain  Hx CAD  ESRD on HD MWF  DM2  Orthostasis hx    Suggest:  HD today after the [facility]  Will follow    Thank you    Patient Active Problem List    Diagnosis Date Noted    Acute chest pain 09/12/2022    ESRD (end stage renal disease) ([address] 09/12/2022    WD - Ulcer of forefoot due to type 2 diabetes mellitus ([address] 09/01/2022    NSTEMI (non-ST elevated myocardial infarction) ([address] 08/24/2022    Chest tightness 08/22/2022    CARMEN (acute kidney injury) ([address] 07/25/2022    Anemia 07/25/2022    Recurrent major depressive disorder, in full remission ([address] 05/18/2021    Generalized anxiety disorder 05/18/2021    Surgical wound dehiscence 02/08/2022    Chest pain 12/21/2021    Other proteinuria     FH: CAD (coronary artery disease) 09/29/2021    ASCVD (arteriosclerotic cardiovascular disease) 09/29/2021    Chronic kidney disease, stage IV (severe) ([address] 09/28/2021    Nephrotic syndrome 09/22/2021    Generalized edema 09/22/2021    Stage 3b chronic kidney disease ([address] 09/22/2021    Diabetic nephropathy associated with type 2 diabetes mellitus ([address] 09/22/2021    Hypoalbuminemia 09/22/2021    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 type 2 diabetes mellitus, with muscle involvement without evidence of necrosis ([address] 09/21/2021    Right groin wound 06/09/2021    Ulcer of right groin with fat layer exposed ([address]     Syncope 06/05/2021    Necrotizing fasciitis ([address] 05/24/2021    Morbid obesity with body mass index (BMI) of 40.0 to 44.9 in adult ([address] 05/21/2021    Irene gangrene     Cellulitis, scrotum 05/13/2021    Acute epididymitis     Cellulitis of left arm 04/03/2021    Cellulitis 03/23/2021    Acute renal failure (ARF) ([facility] 08/04/2019    Diabetic foot infection ([facility] 03/21/2019 Intractable nausea and vomiting 01/11/2019    Uncontrolled type 2 diabetes mellitus ([address] 01/11/2019    Nausea and vomiting 01/11/2019    Constipation 10/07/2018    Cellulitis of left foot     Sepsis ([address] 81/31/9998    Periumbilical hernia     Abscess 12/03/2017    Osteomyelitis ([address] 05/22/2015    Bronchitis 10/15/2013    Hyperlipidemia with target LDL less than 100 07/15/2013    Essential hypertension 07/15/2013    Bilateral carpal tunnel syndrome- left worse than right 06/24/2013    DANIELA (obstructive sleep apnea) 06/20/2013    Urinary frequency 06/20/2013    Neck pain 05/16/2013    Anxiety associated with depression     Panic attack 02/01/2012    Diabetic neuropathy ([address] 12/22/2011    Hiatal hernia 02/04/2011    Diabetes mellitus type 2, improved controlled 02/04/2011         Patient:  Qiana Childers  MRN: [id number]  Consulting physician:  Abiodun Hogan MD  Reason for Consult: chest pain  PCP: Lauri Parker MD    HISTORY OF PRESENT ILLNESS:   The patient is a 52 y.o. male with ESRD on HD MWF, CAD, presented due to severe chest pain during HD yesterday. HD had to be stopped after 2hrs so he could be sent to the ED. He was seen by the cardiologist this am and is going for a [facility]. He has a a coronary stent placed few wks ago  He is about 6kg above his dry weight so will need fluid removal to go back to his euvolemic state  He is doing ok this am    REVIEW OF SYSTEMS:  14 point ROS is Negative.  See positive ROS per HPI    Past Medical History:        Diagnosis Date    Abscess 2010    scrotal    Acute renal failure (ARF) ([address] 08/04/2019    Anxiety associated with depression     Anxiety associated with depression     Back pain 07/02/2012    Chest pain 05/01/2013    Diabetic nephropathy ([address]     Diabetic neuropathy ([address] 12/22/2011    Diabetic ulcer of left midfoot associated with type 2 diabetes mellitus, with fat layer exposed ([address] 07/18/2017    Diverticulosis     C scope + Dr. Tylor Driscoll    DM (diabetes mellitus), type 2 ([facility] 2002    DR. 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 Chase Simeon    Necrotizing fasciitis [facility])     Nose fracture 1988    Panic attacks     Pericarditis 2003    Hospitalized with s/p heart cath normal    Peripheral autonomic neuropathy due to diabetes mellitus ([address]     Axonal EMG- NCS, March 2011    Sebaceous cyst 09/01/2011    URI (upper respiratory infection) 02/27/2012    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 type 2 diabetes mellitus, with muscle involvement without evidence of necrosis ([facility] 9/21/2021    WD-Wound, surgical, infected, initial encounter 12/08/2017    Wrist fracture 1986       Past Surgical History:        Procedure Laterality Date    [address] [facility] [address]  2003, 2013    Normal (Dr. Alexandria Enciso)    COLONOSCOPY  06/02/2011    Pandiverticulosis, Nonbleeding internal hemorrhoids, Repeat colonoscopy at age 48- Dr. Santi Chaudhary      x 3    FOOT SURGERY Left 12/21/2021    EXCISION OF HEAD LEFT 2ND METATARSAL performed by Ignacia Butt DPM at [address] [facility] NONTUNNELED VASCULAR CATHETER  07/25/2022    IR NONTUNNELED VASCULAR CATHETER 7/25/2022 [facility] SPECIAL PROCEDURES    IR TUNNELED CATHETER PLACEMENT GREATER THAN 5 YEARS  07/27/2022    IR TUNNELED CATHETER PLACEMENT GREATER THAN 5 YEARS 7/27/2022 [address] [place] [address]    NOSE SURGERY  1993    \"had reconstruction surgery on nose a year after the other nose surgery\"    OTHER SURGICAL HISTORY Right 05/22/2015    I & D right great toe with partial amputation    OTHER SURGICAL HISTORY  12/04/2017    inc and drainage of abcess    MA DEEP INCIS FOOT BONE INFECTN Left 09/22/2018    LEFT FOOT DEBRIDEMENT INCISION AND DRAINAGE TOP AND BOTTOM performed by Arcelia Fox Heather Alfonso at [facility] N/A 05/15/2021    SCROTAL AND PERINEAL DEBRIDEMENT performed by Linda Cohen MD at [place] [address] 05/16/2021    SCROTAL RE-DEBRIDEMENT  INCISION AND DRAINAGE performed by Linda Cohen MD at [address] [place], [place] [address] 06/18/2013    sebaceous cyst removal times 4     TOE AMPUTATION      right great toe    TOE AMPUTATION Right 03/23/2019    TOE AMPUTATION RIGHT 5TH TOE performed by Edel Liriano DPM at [address] Right 08/06/2019    TOE AMPUTATION RIGHT 4TH TOE performed by Edel Liriano DPM at [address] [facility] ENDOSCOPY  06/02/2011    Mild gastritis with moderatley severe antritis, small hiatal hernia, Dr. Janet Walters N/A 01/12/2019    EGD BIOPSY performed by Analilia Doshi MD at [facility] N/A 07/26/2022    EGD DIAGNOSTIC ONLY performed by Analilia Doshi MD at [facility] ENDOSCOPY       Medications:   Prior to Admission medications    Medication Sig Start Date End Date Taking?  Authorizing Provider   B Complex-C-Folic Acid (ALEX-JEFFERSON) TABS Take 1 tablet by mouth daily 8/9/22   Historical Provider, MD   methocarbamol (ROBAXIN) 500 MG tablet Take 500 mg by mouth nightly 8/31/22   Historical Provider, MD   sevelamer (RENVELA) 800 MG tablet Take 800 mg by mouth 3 times daily (with meals) 8/9/22   Historical Provider, MD   bacitracin 500 UNIT/GM ointment Apply topically 8/18/22   Historical Provider, MD   ranolazine (RANEXA) 500 MG extended release tablet Take 1 tablet by mouth 2 times daily 9/6/22   Leyda Fuller MD   metoprolol tartrate (LOPRESSOR) 25 MG tablet Take 0.5 tablets by mouth 2 times daily 8/25/22   Crystal Mortimer, MD   aspirin 81 MG EC tablet Take 1 tablet by mouth daily 8/25/22   Crystal Mortimer, MD   clopidogrel (PLAVIX) 75 MG tablet Take 1 tablet by mouth daily 8/25/22   Crystal Mortimer, MD   oxyCODONE-acetaminophen (PERCOCET) 5-325 MG per tablet Take 1.5 tablets by mouth daily. Historical Provider, MD   traZODone (DESYREL) 50 MG tablet Take 1 tablet by mouth nightly as needed for Sleep 8/3/22   Felicia Perez MD   alogliptin (NESINA) 6.25 MG TABS tablet Take 1 tablet by mouth in the morning. 8/4/22   Felicia Perez MD   insulin lispro, 1 Unit Dial, (HUMALOG KWIKPEN) 100 UNIT/ML SOPN Inject 20 Units into the skin in the morning and 20 Units at noon and 20 Units in the evening. Inject before meals. Patient taking differently: Inject 35 Units into the skin 3 times daily (before meals) 8/3/22   Felicia Perez MD   insulin glargine (LANTUS SOLOSTAR) 100 UNIT/ML injection pen Inject 45 Units into the skin nightly  Patient taking differently: No sig reported 8/3/22   Felicia Perez MD   ARIPiprazole (ABILIFY) 5 MG tablet Take 1 tablet by mouth at bedtime 8/3/22   Felicia Perez MD   pantoprazole (PROTONIX) 40 MG tablet Take 1 tablet by mouth in the morning and 1 tablet in the evening. Take before meals. 8/3/22   Felicia Perez MD   sucralfate (CARAFATE) 1 GM tablet Take 1 tablet by mouth in the morning and 1 tablet in the evening. Take before meals. Patient not taking: Reported on 9/12/2022 8/3/22   Felicia Perez MD   calcitRIOL (ROCALTROL) 0.25 MCG capsule Take 1 capsule by mouth in the morning.  8/4/22   Felicia Perez MD   furosemide (LASIX) 40 MG tablet Take 1 tablet by mouth daily 9/22/21 8/3/22  Isauro Chandler MD   insulin aspart (NOVOLOG) 100 UNIT/ML injection vial Inject 35 Units into the skin 3 times daily (before meals)  8/3/22  Historical Provider, MD   amLODIPine (NORVASC) 10 MG tablet Take 1 tablet by mouth daily 6/15/21 8/3/22  Nathaly Ruiz MD   chlorthalidone (HYGROTON) 25 MG tablet Take 1 tablet by mouth daily 6/15/21 8/3/22  Nathaly Ruiz MD   linagliptin (TRADJENTA) 5 MG tablet Take 1 tablet by mouth daily 3/29/21 8/3/22  Nathaly Ruiz MD   ondansetron (ZOFRAN) 4 MG tablet Take 1 tablet by mouth every 8 hours as needed for Nausea or Vomiting 9/23/18   Slade Perez MD   Lancets MISC 1 each by Does not apply route daily 9/21/18   Slade Perez MD   glucose monitoring kit (FREESTYLE) monitoring kit 1 each by Does not apply route once for 1 dose. 6/20/13 6/20/13  Lashay Zhang MD        Allergies:  Adhesive tape, Doxycycline, and Reglan [metoclopramide]    Social History:   TOBACCO:   reports that he quit smoking about 2 months ago. His smoking use included cigarettes. He has never used smokeless tobacco.  ETOH:   reports that he does not currently use alcohol.   OCCUPATION:      Family History:       Problem Relation Age of Onset    Cancer Mother         ?site    Stroke Mother     Bleeding Prob Mother     Diabetes Father     Heart Disease Father     High Blood Pressure Father     Obesity Father     Kidney Disease Father     Diabetes Sister     High Blood Pressure Sister     Mental Illness Sister     Obesity Sister     High Blood Pressure Other     Mental Illness Other         bipolar    Other Son         cyst in ear canal    ADHD Daughter      Physical Exam:    Vitals: /71   Pulse 79   Temp 97.8 °F (36.6 °C) (Oral)   Resp 15   Ht 5' 9\" (1.753 m)   Wt 280 lb (127 kg)   SpO2 98%   BMI 41.35 kg/m²   General appearance: in no acute distress, appears stated age  Skin: Skin color, texture, turgor normal. No rashes or lesions  HEENT: normocephalic, atraumatic  Neck: supple, trachea midline  Lungs: clear to auscultation bilaterally, breathing comfortably  Heart[de-identified] regular rate and rhythm, S1, S2 normal,  Abdomen: soft, non-tender; bowel sounds normal; no masses,   Extremities: extremities normal, atraumatic, no cyanosis or edema  Neurologic: Mental status: alert, oriented, interactive, following commands  Psychiatric: mood and affect appropriate     CBC:   Recent Labs     09/12/22  0950   WBC 8.4   HGB 9.6*        BMP:    Recent Labs     09/12/22  0700 09/12/22  0950    132*   K 4.6 3.9    97*   CO2 23 21   BUN 79* 45*   CREATININE 8.8* 5.3*   GLUCOSE 119* 154*     Hepatic:   Recent Labs     09/12/22  0950   AST 16   ALT 11   BILITOT 0.3   ALKPHOS 70     Troponin: No results for input(s): TROPONINI in the last 72 hours. BNP: No results for input(s): BNP in the last 72 hours.   I/O last 3 completed shifts:  In: -   Out: 275 [Urine:275]         Electronically signed by Lynette Colon DO on 9/13/2022 at 7:52 AM    [facility]  Smiley Spurling, MD Dagmar Bale, DO  [facility],  [address]  [place], [place] [address]  PHONE: [phone]  FAX: [phone]

## 2022-09-14 VITALS
BODY MASS INDEX: 44.64 KG/M2 | DIASTOLIC BLOOD PRESSURE: 78 MMHG | RESPIRATION RATE: 14 BRPM | WEIGHT: 301.37 LBS | HEART RATE: 75 BPM | TEMPERATURE: 98.3 F | SYSTOLIC BLOOD PRESSURE: 151 MMHG | HEIGHT: 69 IN | OXYGEN SATURATION: 97 %

## 2022-09-14 PROBLEM — R07.9 ACUTE CHEST PAIN: Status: RESOLVED | Noted: 2022-09-12 | Resolved: 2022-09-14

## 2022-09-14 LAB
ESTIMATED AVERAGE GLUCOSE: 117 MG/DL
FERRITIN: 851 NG/ML (ref 30–400)
FOLATE: 8.8 NG/ML (ref 3.1–17.5)
GLUCOSE BLD-MCNC: 132 MG/DL (ref 70–99)
GLUCOSE BLD-MCNC: 174 MG/DL (ref 70–99)
HBA1C MFR BLD: 5.7 % (ref 4.2–6.3)
HEPATITIS B CORE TOTAL ANTIBODY: NEGATIVE
TROPONIN T: 0.15 NG/ML
VITAMIN B-12: 534 PG/ML (ref 211–911)

## 2022-09-14 PROCEDURE — 82607 VITAMIN B-12: CPT

## 2022-09-14 PROCEDURE — 82728 ASSAY OF FERRITIN: CPT

## 2022-09-14 PROCEDURE — 83550 IRON BINDING TEST: CPT

## 2022-09-14 PROCEDURE — 6360000002 HC RX W HCPCS: Performed by: INTERNAL MEDICINE

## 2022-09-14 PROCEDURE — 82962 GLUCOSE BLOOD TEST: CPT

## 2022-09-14 PROCEDURE — 2580000003 HC RX 258: Performed by: INTERNAL MEDICINE

## 2022-09-14 PROCEDURE — 90935 HEMODIALYSIS ONE EVALUATION: CPT

## 2022-09-14 PROCEDURE — G0378 HOSPITAL OBSERVATION PER HR: HCPCS

## 2022-09-14 PROCEDURE — 36415 COLL VENOUS BLD VENIPUNCTURE: CPT

## 2022-09-14 PROCEDURE — 82746 ASSAY OF FOLIC ACID SERUM: CPT

## 2022-09-14 PROCEDURE — 6370000000 HC RX 637 (ALT 250 FOR IP): Performed by: INTERNAL MEDICINE

## 2022-09-14 PROCEDURE — 96374 THER/PROPH/DIAG INJ IV PUSH: CPT

## 2022-09-14 PROCEDURE — 96372 THER/PROPH/DIAG INJ SC/IM: CPT

## 2022-09-14 PROCEDURE — 94761 N-INVAS EAR/PLS OXIMETRY MLT: CPT

## 2022-09-14 PROCEDURE — 83540 ASSAY OF IRON: CPT

## 2022-09-14 PROCEDURE — 83036 HEMOGLOBIN GLYCOSYLATED A1C: CPT

## 2022-09-14 PROCEDURE — G0257 UNSCHED DIALYSIS ESRD PT HOS: HCPCS

## 2022-09-14 RX ORDER — AMLODIPINE BESYLATE 10 MG/1
10 TABLET ORAL DAILY
Qty: 30 TABLET | Refills: 3 | Status: SHIPPED | OUTPATIENT
Start: 2022-09-14

## 2022-09-14 RX ORDER — ATORVASTATIN CALCIUM 40 MG/1
40 TABLET, FILM COATED ORAL NIGHTLY
Qty: 30 TABLET | Refills: 3 | Status: SHIPPED | OUTPATIENT
Start: 2022-09-14

## 2022-09-14 RX ORDER — CARVEDILOL 12.5 MG/1
12.5 TABLET ORAL 2 TIMES DAILY WITH MEALS
Qty: 60 TABLET | Refills: 3 | Status: SHIPPED | OUTPATIENT
Start: 2022-09-14

## 2022-09-14 RX ORDER — M-VIT,TX,IRON,MINS/CALC/FOLIC 27MG-0.4MG
1 TABLET ORAL DAILY
Qty: 30 TABLET | Refills: 1 | Status: SHIPPED | OUTPATIENT
Start: 2022-09-14

## 2022-09-14 RX ADMIN — PANTOPRAZOLE SODIUM 40 MG: 40 TABLET, DELAYED RELEASE ORAL at 06:34

## 2022-09-14 RX ADMIN — OXYCODONE AND ACETAMINOPHEN 1 TABLET: 5; 325 TABLET ORAL at 06:34

## 2022-09-14 RX ADMIN — SODIUM CHLORIDE, PRESERVATIVE FREE 10 ML: 5 INJECTION INTRAVENOUS at 12:02

## 2022-09-14 RX ADMIN — SEVELAMER CARBONATE 800 MG: 800 TABLET, FILM COATED ORAL at 12:00

## 2022-09-14 RX ADMIN — EPOETIN ALFA-EPBX 10000 UNITS: 10000 INJECTION, SOLUTION INTRAVENOUS; SUBCUTANEOUS at 09:33

## 2022-09-14 RX ADMIN — HEPARIN SODIUM 5000 UNITS: 5000 INJECTION INTRAVENOUS; SUBCUTANEOUS at 06:34

## 2022-09-14 RX ADMIN — ASPIRIN 81 MG: 81 TABLET, COATED ORAL at 11:59

## 2022-09-14 RX ADMIN — OXYCODONE AND ACETAMINOPHEN 1 TABLET: 5; 325 TABLET ORAL at 12:01

## 2022-09-14 RX ADMIN — CALCITRIOL CAPSULES 0.25 MCG 0.25 MCG: 0.25 CAPSULE ORAL at 12:01

## 2022-09-14 RX ADMIN — ONDANSETRON 4 MG: 2 INJECTION INTRAMUSCULAR; INTRAVENOUS at 09:33

## 2022-09-14 RX ADMIN — CLOPIDOGREL BISULFATE 75 MG: 75 TABLET ORAL at 11:59

## 2022-09-14 ASSESSMENT — PAIN SCALES - GENERAL
PAINLEVEL_OUTOF10: 5
PAINLEVEL_OUTOF10: 6
PAINLEVEL_OUTOF10: 0
PAINLEVEL_OUTOF10: 6

## 2022-09-14 ASSESSMENT — PAIN DESCRIPTION - ORIENTATION: ORIENTATION: RIGHT;LEFT

## 2022-09-14 ASSESSMENT — PAIN DESCRIPTION - DESCRIPTORS: DESCRIPTORS: ACHING

## 2022-09-14 ASSESSMENT — PAIN DESCRIPTION - LOCATION: LOCATION: FOOT

## 2022-09-14 ASSESSMENT — PAIN - FUNCTIONAL ASSESSMENT: PAIN_FUNCTIONAL_ASSESSMENT: ACTIVITIES ARE NOT PREVENTED

## 2022-09-14 NOTE — PROGRESS NOTES
Nephrology Progress Note        [address], [address] [address]  Phone: [phone]  Office Hours: 8:30AM - 4:30PM  Monday - Friday 9/14/2022 7:17 AM  Subjective:   Admit Date: 9/12/2022  PCP: Joey Larose MD  Interval History: on room air    Diet: ADULT DIET; Regular; Low Fat/Low Chol/High Fiber/ERLIN; Low Sodium (2 gm); Low Potassium (Less than 3000 mg/day); 60 to 80 gm; 1500 ml      Data:   Scheduled Meds:   epoetin paola-epbx  10,000 Units IntraVENous Once per day on Mon Wed Fri    sodium chloride flush  5-40 mL IntraVENous 2 times per day    carvedilol  12.5 mg Oral BID WC    amLODIPine  10 mg Oral Daily    aspirin  324 mg Oral Once    ARIPiprazole  5 mg Oral Nightly    aspirin  81 mg Oral Daily    atorvastatin  40 mg Oral Nightly    calcitRIOL  0.25 mcg Oral Daily    clopidogrel  75 mg Oral Daily    insulin glargine  60 Units SubCUTAneous Nightly    insulin lispro  25 Units SubCUTAneous TID AC    methocarbamol  500 mg Oral Nightly    pantoprazole  40 mg Oral BID AC    ranolazine  500 mg Oral BID    sevelamer  800 mg Oral TID WC    heparin (porcine)  5,000 Units SubCUTAneous 3 times per day    insulin lispro  0-16 Units SubCUTAneous TID WC    insulin lispro  0-4 Units SubCUTAneous Nightly    multivitamin  1 tablet Oral Daily     Continuous Infusions:   sodium chloride      dextrose       PRN Meds:sodium chloride flush, sodium chloride, acetaminophen, oxyCODONE-acetaminophen **OR** oxyCODONE-acetaminophen, traZODone, ondansetron **OR** ondansetron, polyethylene glycol, glucose, dextrose bolus **OR** dextrose bolus, glucagon (rDNA), dextrose  I/O last 3 completed shifts: In: 65 [P.O.:120]  Out: 2875 [Urine:375]  No intake/output data recorded.     Intake/Output Summary (Last 24 hours) at 9/14/2022 0717  Last data filed at 9/13/2022 2332  Gross per 24 hour   Intake 620 ml   Output 2600 ml   Net -1980 ml       CBC:   Recent Labs     09/12/22  0950 09/13/22  1149   WBC 8.4 5.9 HGB 9.6* 9.2*    272       BMP:    Recent Labs     09/12/22  0700 09/12/22  0950 09/13/22  1149    132* 138   K 4.6 3.9 4.1    97* 101   CO2 23 21 23   BUN 79* 45* 58*   CREATININE 8.8* 5.3* 7.1*   GLUCOSE 119* 154* 150*     Hepatic:   Recent Labs     09/12/22  0950   AST 16   ALT 11   BILITOT 0.3   ALKPHOS 70     Troponin: No results for input(s): TROPONINI in the last 72 hours. BNP: No results for input(s): BNP in the last 72 hours. Lipids: No results for input(s): CHOL, HDL in the last 72 hours. Invalid input(s): LDLCALCU  ABGs:   Lab Results   Component Value Date/Time    PO2ART 119 06/18/2013 01:15 PM    OKG7KZK 47.0 06/18/2013 01:15 PM     INR: No results for input(s): INR in the last 72 hours.     Objective:   Vitals: BP (!) 141/81   Pulse 75   Temp 97.8 °F (36.6 °C) (Oral)   Resp 16   Ht 5' 9\" (1.753 m)   Wt (!) 305 lb 1.9 oz (138.4 kg)   SpO2 97%   BMI 45.06 kg/m²   General appearance: alert and cooperative with exam, in no acute distress  HEENT: normocephalic, atraumatic,   Neck: supple, trachea midline  Lungs: clear to auscultation bilaterally, breathing comfortably on room air  Heart[de-identified] regular rate and rhythm, S1, S2 normal,  Abdomen: soft, non-tender; non distended,   Extremities: extremities atraumatic, no cyanosis or edema  Neurologic: alert, oriented, follows commands, interactive    MEDICAL DECISION MAKING       Patient Active Problem List    Diagnosis Date Noted    Acute chest pain 09/12/2022    ESRD (end stage renal disease) ([facility] 09/12/2022    WD - Ulcer of forefoot due to type 2 diabetes mellitus ([facility] 09/01/2022    NSTEMI (non-ST elevated myocardial infarction) ([facility] 08/24/2022    Chest tightness 08/22/2022    CARMEN (acute kidney injury) ([facility] 07/25/2022    Anemia 07/25/2022    Recurrent major depressive disorder, in full remission ([facility] 05/18/2021    Generalized anxiety disorder 05/18/2021    Surgical wound dehiscence 02/08/2022    Chest pain 12/21/2021    Other proteinuria     FH: CAD (coronary artery disease) 09/29/2021    ASCVD (arteriosclerotic cardiovascular disease) 09/29/2021    Chronic kidney disease, stage IV (severe) ([address] 09/28/2021    Nephrotic syndrome 09/22/2021    Generalized edema 09/22/2021    Stage 3b chronic kidney disease ([address] 09/22/2021    Diabetic nephropathy associated with type 2 diabetes mellitus ([address] 09/22/2021    Hypoalbuminemia 09/22/2021    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 type 2 diabetes mellitus, with muscle involvement without evidence of necrosis ([address] 09/21/2021    Right groin wound 06/09/2021    Ulcer of right groin with fat layer exposed ([address]     Syncope 06/05/2021    Necrotizing fasciitis ([address] 05/24/2021    Morbid obesity with body mass index (BMI) of 40.0 to 44.9 in adult ([address] 05/21/2021    Irene gangrene     Cellulitis, scrotum 05/13/2021    Acute epididymitis     Cellulitis of left arm 04/03/2021    Cellulitis 03/23/2021    Acute renal failure (ARF) ([address] 08/04/2019    Diabetic foot infection ([address] 03/21/2019    Intractable nausea and vomiting 01/11/2019    Uncontrolled type 2 diabetes mellitus ([address] 01/11/2019    Nausea and vomiting 01/11/2019    Constipation 10/07/2018    Cellulitis of left foot     Sepsis ([address] 48/57/3679    Periumbilical hernia     Abscess 12/03/2017    Osteomyelitis ([address] 05/22/2015    Bronchitis 10/15/2013    Hyperlipidemia with target LDL less than 100 07/15/2013    Essential hypertension 07/15/2013    Bilateral carpal tunnel syndrome- left worse than right 06/24/2013    DANIELA (obstructive sleep apnea) 06/20/2013    Urinary frequency 06/20/2013    Neck pain 05/16/2013    Anxiety associated with depression     Panic attack 02/01/2012    Diabetic neuropathy ([address] 12/22/2011    Hiatal hernia 02/04/2011    Diabetes mellitus type 2, improved controlled 02/04/2011     [facility] yesterday, the recent stent is patent  Will plan HD today                Electronically signed by Cindy Pro DO Jacob on 9/14/2022 at 7:17 AM    [address]  [id number]  [address]  [place] ,  [address]  [place], [place] [address]  PHONE: [phone]  FAX: [phone]

## 2022-09-14 NOTE — CONSULTS
Comprehensive Nutrition Assessment    Type and Reason for Visit:  Initial, Consult, Patient Education (ADA/Cardiac Diet/Weight Loss)    Nutrition Recommendations/Plan:   Re-attempt to review diet education over stay   Liberalize diet order to Cardiac/ERLIN diet with 1500 ml FR  Offer Renal oral nutrition supplement and Diabetic oral nutrition supplement daily   Document PO intakes in I/O   Monitor fluids, weights, renal labs, skin status and POC      Malnutrition Assessment:  Malnutrition Status:  No malnutrition (09/14/22 1158)    Context:  Acute Illness       Nutrition Assessment:    Admitted with acute chest pain. Pt is familiar with RD team with previous admission. PMH: ESRD, DMII, HLD, Diabetic ulcers. Hx of pt disliking Benny, will trial different high protein supplement. Currently pt on cardiac, low potassium diet with protein restriction, 1500 ml FR. No po intake this AM as pt was taken to dialysis ? OOR at visit. Provided heart healthy consistent carbohydrates nutrition therapy with ADA plate at visit. Pt has been improving in A1c, receiving insulin injections QID at home. Receiving HD, will monitor as moderate nutrition risk s/s increased energy-protein needs due to HD. Nutrition Related Findings:    POCT 174, A1c 5.7% (improving) No edema per chart. +renevela, retacrit, MV Wound Type: Diabetic Ulcer       Current Nutrition Intake & Therapies:    Average Meal Intake: 0% (dialysis in AM)  Average Supplements Intake: None Ordered  ADULT DIET; Regular; Low Fat/Low Chol/High Fiber/ERLIN; Low Sodium (2 gm); Low Potassium (Less than 3000 mg/day); 60 to 80 gm; 1500 ml    Anthropometric Measures:  Height: 5' 9\" (175.3 cm)  Ideal Body Weight (IBW): 160 lbs (73 kg)    Admission Body Weight: 306 lb 7 oz (139 kg)  Current Body Weight: 306 lb 7 oz (139 kg), 191.5 % IBW.  Weight Source: Bed Scale  Current BMI (kg/m2): 45.2  Usual Body Weight: 280 lb (127 kg) (9/17/21 per chart review)  % Weight Change (Calculated): 9.4  Weight Adjustment For: No Adjustment  BMI Categories: Obese Class 3 (BMI 40.0 or greater)    Estimated Daily Nutrient Needs:  Energy Requirements Based On: Formula  Weight Used for Energy Requirements: Current  Energy (kcal/day): 9439-0861 ([place] St Jeor x 1.1-1.3 Stress factors)  Weight Used for Protein Requirements: Ideal  Protein (g/day):  (1.2-1.4 g/kg IBW)  Method Used for Fluid Requirements: Standard Renal  Fluid (ml/day): 1500 per MD    Nutrition Diagnosis:   Increased nutrient needs related to renal dysfunction as evidenced by dialysis    Nutrition Interventions:   Food and/or Nutrient Delivery: Modify Current Diet, Start Oral Nutrition Supplement  Nutrition Education/Counseling: Education needed (Left handouts)  Coordination of Nutrition Care: Continue to monitor while inpatient  Plan of Care discussed with: patient    Goals:  Goals: Meet at least 75% of estimated needs    Nutrition Monitoring and Evaluation:   Food/Nutrient Intake Outcomes: Diet Advancement/Tolerance, Supplement Intake, Food and Nutrient Intake  Physical Signs/Symptoms Outcomes: Biochemical Data, Weight, GI Status, Meal Time Behavior, Skin    Discharge Planning:     Too soon to determine     Mimi Serrato RD, LD  Contact: [phone]

## 2022-09-14 NOTE — PLAN OF CARE
Problem: Discharge Planning  Goal: Discharge to home or other facility with appropriate resources  Outcome: Completed     Problem: Pain  Goal: Verbalizes/displays adequate comfort level or baseline comfort level  Outcome: Completed     Problem: Safety - Adult  Goal: Free from fall injury  Outcome: Completed     Problem: Chronic Conditions and Co-morbidities  Goal: Patient's chronic conditions and co-morbidity symptoms are monitored and maintained or improved  Outcome: Completed     Problem: Nutrition Deficit:  Goal: Optimize nutritional status  Outcome: Completed

## 2022-09-14 NOTE — PROGRESS NOTES
Patient Name: Leora Opitz  Patient : 1975  MRN: [id number]     Acct: [de-identified]  Date of Admission: 2022  Room/Bed: 3107/3107-A  Code Status:  Full Code  Allergies:    Allergies   Allergen Reactions    Adhesive Tape Rash    Doxycycline Nausea And Vomiting    Reglan [Metoclopramide] Anxiety     Diagnosis:    Patient Active Problem List   Diagnosis    Hiatal hernia    Diabetes mellitus type 2, improved controlled    Diabetic neuropathy (HCC)    Panic attack    Anxiety associated with depression    Neck pain    DANIELA (obstructive sleep apnea)    Urinary frequency    Bilateral carpal tunnel syndrome- left worse than right    Hyperlipidemia with target LDL less than 100    Essential hypertension    Bronchitis    Osteomyelitis (HCC)    Abscess    Periumbilical hernia    Sepsis (HCC)    Cellulitis of left foot    Constipation    Intractable nausea and vomiting    Uncontrolled type 2 diabetes mellitus (HCC)    Nausea and vomiting    Diabetic foot infection (HCC)    Acute renal failure (ARF) (HCC)    Cellulitis    Cellulitis of left arm    Cellulitis, scrotum    Acute epididymitis    Irene gangrene    Recurrent major depressive disorder, in full remission ([address]    Generalized anxiety disorder    Morbid obesity with body mass index (BMI) of 40.0 to 44.9 in [facility])    Necrotizing fasciitis (HCC)    Syncope    Right groin wound    Ulcer of right groin with fat layer exposed ([address]    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 type 2 diabetes mellitus, with muscle involvement without evidence of necrosis (HCC)    Nephrotic syndrome    Generalized edema    Stage 3b chronic kidney disease (HCC)    Diabetic nephropathy associated with type 2 diabetes mellitus (HCC)    Hypoalbuminemia    Chronic kidney disease, stage IV (severe) (HCC)    FH: CAD (coronary artery disease)    ASCVD (arteriosclerotic cardiovascular disease)    Other proteinuria    Chest pain    Surgical wound dehiscence    CARMEN (acute kidney injury) ([facility]    Anemia    Chest tightness    NSTEMI (non-ST elevated myocardial infarction) (formerly [facility])    WD - Ulcer of forefoot due to type 2 diabetes mellitus (formerly [facility])    Acute chest pain    ESRD (end stage renal disease) ([facility]         Treatment:  Hemodilaysis 2:1  Priority: Routine  Location: Acute Room    Diabetic: Yes  NPO: No  Isolation Precautions: Dialysis     Consent for Treatment Verified: Yes  Blood Consent Verified: Not Applicable     Safety Verified: Identify (I), Consent (C), Equipment (E), HepB Status (B), Orders Complete (O), Access Verified (A), and Timeliness (T)  Time out performed prior to access at 0707 hours. Report Received from Primary RN at 0745 hours. Primary RN (First Initial, Last Name, Title): Tessa Gay RN  Incapacitated Nurse Education Completed: Not Applicable     HBsAg ONLY:  Date Drawn: September 13, 2022       Results: Negative  HBsAb:  Date Drawn:  September 13, 2022       Results: Susceptible <10    Order  Dialysis Bath  K+ (Potassium): 3  Ca+ (Calcium): 2.5  Na+ (Sodium): 138  HCO3 (Bicarb): 30  Bicarbonate Concentrate Lot No.: [id number]  Acid Concentrate Lot No.: [id number]     Na+ Modeling: Not Applicable  Dialyzer: U132  Dialysate Temperature (C):  35  Blood Flow Rate (BFR):  350   Dialysate Flow Rate (DFR):   700        Access to be Utilized   Access:  Tunneled Catheter  Location: Subclavian  Side: Right   Needle gauge:  Not Applicable  + Bruit/Thrill: Not Applicable    First Use X-ray Verified: Not Applicable  OK to use line order: Not Applicable    Site Assessment:  Signs and Symptoms of Infection/Inflammation: None  If yes: Not Applicable  Dressing: Dry and Intact  Site Prep: Medical Aseptic Technique  Dressing Changed this Treatment: No  If yes, by whom: NA - not changed today  Date of Last Dressing Change: September 13, 2022  Antimicrobial Patch in place?: Yes  Red Alcohol Caps in place?: Yes  Gauze Dressing?: No  Non Dialysis Use?: No  Comment:    Flows: Good, Patent  If access problem, who was notified:     Pre and Post-Assessment  Patient Vitals for the past 8 hrs:   Level of Consciousness Oriented X Heart Rhythm Respiratory Quality/Effort O2 Device Bilateral Breath Sounds Skin Color Skin Condition/Temp Abdomen Inspection Bowel Sounds (All Quadrants) Edema Comments Pain Level   09/14/22 0400 0 -- -- Unlabored -- -- -- -- Soft -- None -- 5   09/14/22 0634 -- -- -- -- -- -- -- -- -- -- -- -- 6   09/14/22 0749 0 4 Regular Unlabored None (Room air) Clear [place] Dry;Warm Soft Active None pre treatment vitals 0   09/14/22 1110 0 4 Regular Unlabored None (Room air) Clear [place] Dry;Warm Soft -- -- -- --     Labs  Recent Labs     09/12/22  0950 09/13/22  1149   WBC 8.4 5.9   HGB 9.6* 9.2*   HCT 29.9* 29.3*    272                                                                  Recent Labs     09/12/22  0700 09/12/22  0950 09/13/22  1149    132* 138   K 4.6 3.9 4.1    97* 101   CO2 23 21 23   BUN 79* 45* 58*   CREATININE 8.8* 5.3* 7.1*   GLUCOSE 119* 154* 150*     IV Drips and Rate/Dose   sodium chloride      dextrose        Safety - Before each treatment:   Dialysis Machine No.: [id number]   Machine Number: [id number]  Dialyzer Lot No.: [id number]  RO Machine Log Sheet Completed: Yes  Machine Alarm Self Test: Completed; Passed (09/13/22 1319)     Air Foam Detector: Tested, Proper Function  Extracorporeal Circuit Tested for Integrity: Yes  Machine Conductivity: 14.0  Manual Conductivity: 14.2     Bicarbonate Concentrate Lot No.: [id number]  Acid Concentrate Lot No.: [id number]  Manual Ph: 7.4  Bleach Test (Neg): No  Bath Temperature: 95 °F (35 °C)  Tubing Lot#: [id number]  Conductivity Meter Serial #: [id number]  All Connections Secure?: Yes  Venous Parameters Set?: Yes  Arterial Parameters Set?: Yes  Saline Line Double Clamped?: Yes  Air Foam Detector Engaged?: Yes  Machine Functioning Alarm Free?  Yes  Prime Given: 200ml    Chlorine Testing - Before each treatment and every 4 hours:   Treatment  Treatment Number: 2  Time On: 7895  Time Off: 1100  Treatment Goal: 2kg  Weight: (!) 301 lb 5.9 oz (136.7 kg) (09/14/22 1100)  1st check: less than 0.1 ppm at: 0700 hours  2nd check: less than 0.1 ppm at: 1030 hours  3rd check: Not Applicable  (if greater than 0.1 ppm, then check every 30 minutes from secondary)    Access Flows and Pressures  Patient Vitals for the past 8 hrs:   Blood Flow Rate (ml/min) Ultrafiltration Rate (ml/hr) Arterial Pressure (mmHg) Venous Pressure (mmHg) TMP DFR Comments Access Visible   09/14/22 0757 300 ml/min 830 ml/hr -80 mmHg 120 60 500 tx initiated Yes   09/14/22 0800 300 ml/min 830 ml/hr -80 mmHg 120 60 500 RESTING WITH EYES CLOSED Yes   09/14/22 0815 300 ml/min 830 ml/hr -100 mmHg 120 60 500 RESTING WITH EYES CLOSED Yes   09/14/22 0830 300 ml/min 830 ml/hr -110 mmHg 120 60 500 no complaints Yes   09/14/22 0845 300 ml/min 830 ml/hr -110 mmHg 130 70 500 RESTING WITH EYES CLOSED Yes   09/14/22 0900 300 ml/min 830 ml/hr -110 mmHg 120 60 500 NO CHANGE, RESTING Yes   09/14/22 0915 300 ml/min 830 ml/hr -110 mmHg 120 60 500 resting Yes   09/14/22 0930 300 ml/min 830 ml/hr -110 mmHg 120 70 500 gave zofran Yes   09/14/22 0945 300 ml/min 830 ml/hr -110 mmHg 120 70 500 resting Yes   09/14/22 1000 300 ml/min 830 ml/hr -110 mmHg 130 70 500 RESTING WITH EYES CLOSED Yes   09/14/22 1015 300 ml/min 830 ml/hr -110 mmHg 130 70 500 no changes Yes   09/14/22 1030 300 ml/min 830 ml/hr -110 mmHg 130 60 500 resting Yes   09/14/22 1045 300 ml/min 830 ml/hr -110 mmHg 130 60 500 no distress Yes   09/14/22 1100 200 ml/min 0 ml/hr -10 mmHg 30 40 500 TX ENDED RINSEBACK GIVEN Yes     Vital Signs  Patient Vitals for the past 8 hrs:   BP Temp Pulse Resp SpO2 Height Weight Weight Method Percent Weight Change   09/14/22 0749 132/83 98.1 °F (36.7 °C) 75 12 97 % -- (!) 306 lb 7 oz (139 kg) -- 0.43   09/14/22 0757 (!) 141/84 -- 74 13 -- -- -- -- --   09/14/22 0800 (!) 141/84 -- 74 12 -- -- -- -- -- 09/14/22 0815 120/80 -- 74 19 -- -- -- -- --   09/14/22 0821 -- -- -- -- -- 5' 9\" (1.753 m) -- -- --   09/14/22 0830 115/61 -- 72 11 -- -- -- -- --   09/14/22 0845 113/69 -- 75 21 -- -- -- -- --   09/14/22 0900 112/63 -- 74 17 -- -- -- -- --   09/14/22 0915 (!) 104/58 -- 74 19 -- -- -- -- --   09/14/22 0930 120/72 -- 75 18 -- -- -- -- --   09/14/22 0945 99/60 -- 73 10 -- -- -- -- --   09/14/22 1000 (!) 98/53 -- 74 (!) 9 -- -- -- -- --   09/14/22 1015 (!) 93/53 -- 72 21 -- -- -- -- --   09/14/22 1030 105/66 -- 74 11 -- -- -- -- --   09/14/22 1045 (!) 84/51 -- 74 10 -- -- -- -- --   09/14/22 1100 -- 98 °F (36.7 °C) -- -- 98 % -- (!) 301 lb 5.9 oz (136.7 kg) Bed scale -1.65   09/14/22 1110 (!) 93/58 97.7 °F (36.5 °C) 73 22 98 % -- -- -- --     Post-Dialysis  Arterial Catheter Locking Solution:  NS  Venous Catheter Locking Solution:  NS  Post-Treatment Procedures: Blood returned, Catheter Capped, clamped with Saline x2 ports  Machine Disinfection Process: Acid/Vinegar Clean, Heat Disinfect, Exterior Machine Disinfection  Rinseback Volume (ml): 300 ml  Blood Volume Processed (Liters): 53.9 l/min  Dialyzer Clearance: Moderately streaked  Duration of Treatment (minutes): 180 minutes     Hemodialysis Intake (ml): 500 ml  Hemodialysis Output (ml): 2500 ml     Tolerated Treatment: Good  Patient Response to Treatment: [facility]  Physician Notified: No       Provider Notification        Handoff complete and report given to Primary RN at 1115 hours. Primary RN (First Initial, Last Name, Title):  IRWIN Kilpatrick RN     Education  Person Educated: Patient   Knowledge Base: Substantial  Barriers to Learning?: None  Preferred method of Learning: Oral  Topic(s): Access Care, Signs and Symptoms of Infection, and Procedural   Teaching Tools: Explanation   Response to Education: Verbalized Understanding     Electronically signed by Vick Sidhu RN on 9/14/2022 at 11:15 AM

## 2022-09-14 NOTE — PROGRESS NOTES
Outpatient Pharmacy Progress Note for Meds-to-Beds    Total number of Prescriptions Filled: 4  The following medications were dispensed to the patient during the discharge process:  Amlodipine  Therapeutic multivitamin  Atorvastatin  Carvedilol    Additional Documentation:  Patient's family member picked-up the medication(s) in the OP Pharmacy      Thank you for letting us serve your patients.   [facility]    [address], [address] [facility]    Phone: [phone]    Fax: [phone]

## 2022-09-14 NOTE — DISCHARGE SUMMARY
Discharge Summary    Name:  Ruslan Beckham /Age/Sex: 1975  (52 y.o. male)   MRN & CSN:  [id number] & [id number] Admission Date/Time: 2022  8:59 AM   Attending:  Randy Brand MD Discharging Physician: Randy Brand MD       Discharge Diagnosis and plan:  Chest tightness, resolved  -Likely due to demand ischemia during dialysis  -Per patient was hypotensive during dialysis session  -Troponins in the ED 0.119 - baseline for patient  -EKG, normal sinus rhythm, no ST-T wave changes concerning for ACS  -Cardiology on board  LHC done  with patent stents, no intervention  Echo stable     NSTEMI, non-cardiac cause for troponin elevation, no concern for ACS  Initial troponin 0.119->0155 (baseline for patient)     Coronary artery disease s/p PCI on 2022 and  in   On aspirin and Plavix, compliant  Not taking atorvastatin, resumed  LHC on 2022 revealed 90% stenosis mid circumflex, which was stented with KELLY-III flow, otherwise no hemodynamically significant disease --> repeat stent negative for occlusion     ESRD  3 times a week HD  Nephrology on board     History of anemia  Due to ESRD  Patient on iron infusion during dialysis  Hemoglobin on presentation 9.6G/DL     Hypertension  On amlodipine, metoprolol, resumed     Type 2 diabetes mellitus, insulin-dependent  Takes Lantus 75 units at night and 35 units 3 times a day     Anxiety and depression  On trazodone, Abilify      Discharge Exam  BP (!) 151/78   Pulse 75   Temp 98.3 °F (36.8 °C) (Oral)   Resp 14   Ht 5' 9\" (1.753 m)   Wt (!) 301 lb 5.9 oz (136.7 kg)   SpO2 97%   BMI 44.50 kg/m²     Physical Exam  General appearance: Well, no apparent distress, appears stated age and cooperative. HEENT Normal cephalic, atraumatic without obvious deformity. Pupils equal, round, and reactive to light. Extra ocular muscles intact. Conjunctivae/corneas clear. Neck: Supple, No jugular venous distention/bruits.  Trachea midline without thyromegaly or adenopathy with full range of motion. Lungs: Clear to ascultation, bilaterally without Rales/Wheezes/Rhonchi with good respiratory effort. Heart: Regular rate and rhythm with Normal S1/S2 without  murmurs, rubs or gallops, point of maximum impulse non-displaced  Abdomen: Soft, non-tender or non-distended without rigidity or guarding and positive bowel sounds all four quadrants. Extremities: No clubbing, cyanosis, or edema bilaterally. Full range of motion without deformity and normal gait intact. Skin: Skin color, texture, turgor normal. No rashes or lesions. Neurologic: Alert and oriented X 3,  neurovascularly intact with sensory/motor intact upper extremities/lower extremities, bilaterally. Cranial nerves:II-XII intact, grossly non-focal.  Mental status: Alert, oriented, thought content appropriate. Hospital Course:   Buck Santizo is a 52 y.o.  male  who presents with Acute chest pain    Chest pain resolved. LHC shows patent arteries and patent stents  Discharge at a stablel condition    The patient expressed appropriate understanding of and agreement with the discharge recommendations, medications, and plan.      Consults this admission:  IP CONSULT TO NEPHROLOGY  IP CONSULT TO CARDIOLOGY  IP CONSULT TO HOSPITALIST  IP CONSULT TO SOCIAL WORK  IP CONSULT TO CARDIOLOGY  IP CONSULT TO DIETITIAN      Discharge Instruction:   Handoff to PCP:   -  Follow up appointments: PCP, nephro, cardiac  Primary care physician: Heydi Pichardo MD    Diet:  regular diet   Activity: activity as tolerated  Disposition: Discharged to:   [x]Home, [][facility], []SNF, []Acute Rehab, []Hospice   Condition on discharge: Stable    Discharge Medications:        Medication List        START taking these medications      carvedilol 12.5 MG tablet  Commonly known as: COREG  Take 1 tablet by mouth 2 times daily (with meals)     epoetin paola-epbx 80421 UNIT/ML Soln injection  Commonly known as: RETACRIT  Inject 1 mL into the skin three times a week To be given in HD by nurse  Start taking on: September 16, 2022     therapeutic multivitamin-minerals tablet  Take 1 tablet by mouth daily            CONTINUE taking these medications      alogliptin 6.25 MG Tabs tablet  Commonly known as: NESINA  Take 1 tablet by mouth in the morning. amLODIPine 10 MG tablet  Commonly known as: NORVASC  Take 1 tablet by mouth daily     ARIPiprazole 5 MG tablet  Commonly known as: ABILIFY  Take 1 tablet by mouth at bedtime     aspirin 81 MG EC tablet  Take 1 tablet by mouth daily     atorvastatin 40 MG tablet  Commonly known as: LIPITOR  Take 1 tablet by mouth nightly     bacitracin 500 UNIT/GM ointment     calcitRIOL 0.25 MCG capsule  Commonly known as: ROCALTROL  Take 1 capsule by mouth in the morning. clopidogrel 75 MG tablet  Commonly known as: PLAVIX  Take 1 tablet by mouth daily     glucose monitoring kit  1 each by Does not apply route once for 1 dose. Lancets Misc  1 each by Does not apply route daily     methocarbamol 500 MG tablet  Commonly known as: ROBAXIN     ondansetron 4 MG tablet  Commonly known as: Zofran  Take 1 tablet by mouth every 8 hours as needed for Nausea or Vomiting     oxyCODONE-acetaminophen 5-325 MG per tablet  Commonly known as: PERCOCET     pantoprazole 40 MG tablet  Commonly known as: PROTONIX  Take 1 tablet by mouth in the morning and 1 tablet in the evening. Take before meals.      ranolazine 500 MG extended release tablet  Commonly known as: Ranexa  Take 1 tablet by mouth 2 times daily     george-roland Tabs     sevelamer 800 MG tablet  Commonly known as: RENVELA     traZODone 50 MG tablet  Commonly known as: DESYREL  Take 1 tablet by mouth nightly as needed for Sleep            STOP taking these medications      chlorthalidone 25 MG tablet  Commonly known as: HYGROTON     furosemide 40 MG tablet  Commonly known as: Lasix     insulin aspart 100 UNIT/ML injection vial  Commonly known as: NOVOLOG     linagliptin 5 MG tablet  Commonly known as: TRADJENTA     metoprolol tartrate 25 MG tablet  Commonly known as: LOPRESSOR            ASK your doctor about these medications      insulin lispro (1 Unit Dial) 100 UNIT/ML Sopn  Commonly known as: HumaLOG KwikPen  Inject 20 Units into the skin in the morning and 20 Units at noon and 20 Units in the evening. Inject before meals. Lantus SoloStar 100 UNIT/ML injection pen  Generic drug: insulin glargine  Inject 45 Units into the skin nightly     sucralfate 1 GM tablet  Commonly known as: CARAFATE  Take 1 tablet by mouth in the morning and 1 tablet in the evening. Take before meals. Where to Get Your Medications        These medications were sent to [address] [place] [address], [address] [id number]      Phone: [phone]   amLODIPine 10 MG tablet  atorvastatin 40 MG tablet  carvedilol 12.5 MG tablet  epoetin paola-epbx 14538 UNIT/ML Soln injection  therapeutic multivitamin-minerals tablet         Objective Findings at Discharge:       BMP/CBC  Recent Labs     09/12/22  0700 09/12/22  0950 09/13/22  1149    132* 138   K 4.6 3.9 4.1    97* 101   CO2 23 21 23   BUN 79* 45* 58*   CREATININE 8.8* 5.3* 7.1*   WBC  --  8.4 5.9   HCT  --  29.9* 29.3*   PLT  --  257 272       Additional Information: Patient seen and examined day of discharge.  For more information regarding patient's care please contact [facility] records [phone]    Discharge Time of 30 minutes    Electronically signed by Memo El MD on 9/14/2022 at 12:59 PM

## 2022-09-14 NOTE — ED PROVIDER NOTES
Triage Chief Complaint:   Chest Pain (Chest pain that started at dialysis/)    [place]:  Andra Webster is a 52 y.o. male that presents with chest pain. Patient was in baseline state of health until just prior to arrival when the above started. Patient was at dialysis being dialyzed when he developed a central chest tightness into his left upper chest and left arm. Patient became diaphoretic and was hypotensive and dialysis was stopped. Patient was sent to the emergency department for further evaluation. Patient denies any shortness of breath. No nausea. Patient presently reports pain is mild but is persistent. Patient does have known coronary disease status postcardiac cath and stenting. Patient actually underwent cardiac cath just last month and had PCI of the circumflex performed at that time. Patient follows with a heart house.     ROS:  General:  No fevers, no chills, no weakness  Eyes:  No recent vison changes, no discharge  ENT:  No sore throat, no nasal congestion  Cardiovascular:  + chest pain, no palpitations  Respiratory:  No shortness of breath, no cough, no wheezing  Gastrointestinal:  No pain, no nausea, no vomiting, no diarrhea  Musculoskeletal:  No muscle pain, no joint pain  Skin:  No rash, no pruritis, no easy bruising  Neurologic:  No speech problems, no headache, no extremity numbness, no extremity tingling, no extremity weakness  Psychiatric:  No anxiety  Genitourinary:  No dysuria, no increased urinary frequency  Extremities:  no edema, no pain    Past Medical History:   Diagnosis Date    Abscess 2010    scrotal    Acute renal failure (ARF) ([facility] 08/04/2019    Anxiety associated with depression     Anxiety associated with depression     Back pain 07/02/2012    Chest pain 05/01/2013    Diabetic nephropathy (HCC)     Diabetic neuropathy ([address] 12/22/2011    Diabetic ulcer of left midfoot associated with type 2 diabetes mellitus, with fat layer exposed ([address] 07/18/2017    Diverticulosis C scope + Dr. Kathi Conrad    DM (diabetes mellitus), type 2 ([address] 2002    DR. 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 Kathi Conrad    Necrotizing fasciitis [facility])     Nose fracture 1988    Panic attacks     Pericarditis 2003    Hospitalized with s/p heart cath normal    Peripheral autonomic neuropathy due to diabetes mellitus ([address]     Axonal EMG- NCS, March 2011    Sebaceous cyst 09/01/2011    URI (upper respiratory infection) 02/27/2012    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 type 2 diabetes mellitus, with muscle involvement without evidence of necrosis ([address] 9/21/2021    WD-Wound, surgical, infected, initial encounter 12/08/2017    Wrist fracture 1986     Past Surgical History:   Procedure Laterality Date    [address] [place] [address]  2003, 2013    Normal (Dr. Isabell Guzman)    COLONOSCOPY  06/02/2011    Pandiverticulosis, Nonbleeding internal hemorrhoids, Repeat colonoscopy at age 48- Dr. Braxton Butler      x 3    FOOT SURGERY Left 12/21/2021    EXCISION OF HEAD LEFT 2ND METATARSAL performed by Shannon Salamanca DPM at [address] NONTUNNELED VASCULAR CATHETER  07/25/2022    IR NONTUNNELED VASCULAR CATHETER 7/25/2022 [facility] SPECIAL PROCEDURES    IR TUNNELED CATHETER PLACEMENT GREATER THAN 5 YEARS  07/27/2022    IR TUNNELED CATHETER PLACEMENT GREATER THAN 5 YEARS 7/27/2022 [facility] SPECIAL PROCEDURES    NOSE SURGERY  1993    \"had reconstruction surgery on nose a year after the other nose surgery\"    OTHER SURGICAL HISTORY Right 05/22/2015    I & D right great toe with partial amputation    OTHER SURGICAL HISTORY  12/04/2017    inc and drainage of abcess    [place] DEEP INCIS FOOT BONE INFECTN Left 09/22/2018    LEFT FOOT DEBRIDEMENT INCISION AND DRAINAGE TOP AND BOTTOM performed by Airam Lee DPM at [facility] N/A 05/15/2021    SCROTAL AND PERINEAL DEBRIDEMENT performed by Mahin Rainey MD at [facility] N/A 2021    SCROTAL RE-DEBRIDEMENT  INCISION AND DRAINAGE performed by Mahin Rainey MD at [address] N/A 2013    sebaceous cyst removal times 4     TOE AMPUTATION      right great toe    TOE AMPUTATION Right 2019    TOE AMPUTATION RIGHT 5TH TOE performed by Airam Lee DPM at [address] Right 2019    TOE AMPUTATION RIGHT 4TH TOE performed by Airam Lee DPM at [address] [facility] ENDOSCOPY  2011    Mild gastritis with moderatley severe antritis, small hiatal hernia, Dr. Laura Hamilton 2019    EGD BIOPSY performed by Chantelle Collier MD at [place] [address] N/A 2022    EGD DIAGNOSTIC ONLY performed by Chantelle Collier MD at [facility] ENDOSCOPY     Family History   Problem Relation Age of Onset    Cancer Mother         ?site    Stroke Mother     Bleeding Prob Mother     Diabetes Father     Heart Disease Father     High Blood Pressure Father     Obesity Father     Kidney Disease Father     Diabetes Sister     High Blood Pressure Sister     Mental Illness Sister     Obesity Sister     High Blood Pressure Other     Mental Illness Other         bipolar    Other Son         cyst in ear canal    ADHD Daughter      Social History     Socioeconomic History    Marital status:      Spouse name: Not on file    Number of children: Not on file    Years of education: Not on file    Highest education level: Not on file   Occupational History    Not on file   Tobacco Use    Smoking status: Former     Years: 20.00     Types: Cigarettes     Quit date: 2022     Years since quittin.1    Smokeless tobacco: Never    Tobacco comments:     Smokes when drinking/social atorvastatin (LIPITOR) tablet 40 mg  40 mg Oral Nightly Venkat Gillespie MD   40 mg at 09/13/22 2105    calcitRIOL (ROCALTROL) capsule 0.25 mcg  0.25 mcg Oral Daily Venkat Gillespie MD   0.25 mcg at 09/13/22 0916    clopidogrel (PLAVIX) tablet 75 mg  75 mg Oral Daily Venkat Gillespie MD   75 mg at 09/13/22 0916    insulin glargine (LANTUS) injection vial 60 Units  60 Units SubCUTAneous Nightly Venkat Gillespie MD   60 Units at 09/13/22 2106    insulin lispro (HUMALOG) injection vial 25 Units  25 Units SubCUTAneous TID [facility] Venkat Gillespie MD   25 Units at 09/13/22 1813    methocarbamol (ROBAXIN) tablet 500 mg  500 mg Oral Nightly Venkat Gillespie MD   500 mg at 09/13/22 2105    pantoprazole (PROTONIX) tablet 40 mg  40 mg Oral BID AC Venkat Gillespie MD   40 mg at 09/13/22 1721    ranolazine (RANEXA) extended release tablet 500 mg  500 mg Oral BID Venkat Gillespie MD   500 mg at 09/13/22 2105    sevelamer (RENVELA) tablet 800 mg  800 mg Oral TID  Venkat Gillespie MD   800 mg at 09/13/22 1721    traZODone (DESYREL) tablet 50 mg  50 mg Oral Nightly PRN Venkat Gillespie MD   50 mg at 09/12/22 2105    ondansetron (ZOFRAN-ODT) disintegrating tablet 4 mg  4 mg Oral Q8H PRN Venkat Gillespie MD        Or    ondans[facility]ron [facility]) injection 4 mg  4 mg IntraVENous Q6H PRN Venkat Gillespie MD        polyethylene glycol (GLYCOLAX) packet 17 g  17 g Oral Daily PRN Venkat Gillespie MD        heparin (porcine) injection 5,000 Units  5,000 Units SubCUTAneous 3 times per day Venkat Gillespie MD   5,000 Units at 09/13/22 2105    insulin lispro (HUMALOG) injection vial 0-16 Units  0-16 Units SubCUTAneous TID  Venkat Gillespie MD   8 Units at 09/13/22 1226    insulin lispro (HUMALOG) injection vial 0-4 Units  0-4 Units SubCUTAneous Nightly Venkat Gillespie MD        glucose chewable tablet 16 g  4 tablet Oral PRN Venkat Gillespie MD        dextrose bolus 10% 125 mL  125 mL IntraVENous PRN Venkat Gillespie MD        Or dextrose bolus 10% 250 mL  250 mL IntraVENous PRN Jordyn Gaming MD        glucagon (rDNA) injection 1 mg  1 mg SubCUTAneous PRN Jordyn Gaming MD        dextrose 10 % infusion   IntraVENous Continuous PRN Jordyn Gaming MD        therapeutic multivitamin-minerals 1 tablet  1 tablet Oral Daily Jordyn Gaming MD   1 tablet at 09/13/22 2105     Allergies   Allergen Reactions    Adhesive Tape Rash    Doxycycline Nausea And Vomiting    Reglan [Metoclopramide] Anxiety       Nursing Notes Reviewed    Physical Exam:  ED Triage Vitals   Enc Vitals Group      BP 09/12/22 0901 129/81      Heart Rate 09/12/22 0901 85      Resp 09/12/22 0901 18      Temp 09/12/22 0901 98.2 °F (36.8 °C)      Temp Source 09/12/22 2015 Oral      SpO2 09/12/22 0901 95 %      Weight 09/12/22 0901 300 lb (136.1 kg)      Height 09/12/22 0901 5' 9\" (1.753 m)      Head Circumference --       Peak Flow --       Pain Score --       Pain Loc --       Pain Edu? --       Excl. in [address]? --        My pulse ox interpretation is - normal    General appearance:  No acute distress. Appears slightly anxious upon wakening but sleeping when I walked in the room. Skin:  Warm. Dry. No diaphoresis. No rash to exposed skin. Eye:  Extraocular movements intact. Ears, nose, mouth and throat:  Oral mucosa moist   Neck:  Trachea midline. Extremity:  Normal ROM. No bilateral lower extremity edema. No calf tenderness to palpation. Heart:  Regular rate and rhythm, normal S1 & S2, no extra heart sounds. Perfusion:  Intact  Respiratory:  Lungs clear to auscultation bilaterally. Respirations nonlabored. Abdominal:  Normal bowel sounds. Soft. Nontender. Non distended. Back:  No CVA tenderness to palpation     Neurological:  Alert and oriented times 3. No focal neuro deficits. Psychiatric: Slightly anxious. [place]field thoughts.     I have reviewed and interpreted all of the currently available lab results from this visit (if applicable):  Results for orders placed or performed during the hospital encounter of 09/12/22   CBC with Auto Differential   Result Value Ref Range    WBC 8.4 4.0 - 10.5 K/CU MM    RBC 3.32 (L) 4.6 - 6.2 M/CU MM    Hemoglobin 9.6 (L) 13.5 - 18.0 GM/DL    Hematocrit 29.9 (L) 42 - 52 %    MCV 90.1 78 - 100 FL    MCH 28.9 27 - 31 PG    MCHC 32.1 32.0 - 36.0 %    RDW 14.7 11.7 - 14.9 %    Platelets 101 208 - 290 K/CU MM    MPV 9.7 7.5 - 11.1 FL    Differential Type AUTOMATED DIFFERENTIAL     Segs Relative 80.6 (H) 36 - 66 %    Lymphocytes % 9.9 (L) 24 - 44 %    Monocytes % 6.4 (H) 0 - 4 %    Eosinophils % 2.1 0 - 3 %    Basophils % 0.4 0 - 1 %    Segs Absolute 6.8 K/CU MM    Lymphocytes Absolute 0.8 K/CU MM    Monocytes Absolute 0.5 K/CU MM    Eosinophils Absolute 0.2 K/CU MM    Basophils Absolute 0.0 K/CU MM    Nucleated RBC % 0.0 %    Total Nucleated RBC 0.0 K/CU MM    Total Immature Neutrophil 0.05 K/CU MM    Immature Neutrophil % 0.6 (H) 0 - 0.43 %   Comprehensive Metabolic Panel   Result Value Ref Range    Sodium 132 (L) 135 - 145 MMOL/L    Potassium 3.9 3.5 - 5.1 MMOL/L    Chloride 97 (L) 99 - 110 mMol/L    CO2 21 21 - 32 MMOL/L    BUN 45 (H) 6 - 23 MG/DL    Creatinine 5.3 (H) 0.9 - 1.3 MG/DL    Glucose 154 (H) 70 - 99 MG/DL    Calcium 8.1 (L) 8.3 - 10.6 MG/DL    Albumin 3.2 (L) 3.4 - 5.0 GM/DL    Total Protein 7.0 6.4 - 8.2 GM/DL    Total Bilirubin 0.3 0.0 - 1.0 MG/DL    ALT 11 10 - 40 U/L    AST 16 15 - 37 IU/L    Alkaline Phosphatase 70 40 - 128 IU/L    GFR Non- 12 (L) >60 mL/min/1.73m2    GFR  14 (L) >60 mL/min/1.73m2    Anion Gap 14 4 - 16   Troponin   Result Value Ref Range    Troponin T 0.119 (HH) <0.01 NG/ML   Basic Metabolic Panel w/ Reflex to MG   Result Value Ref Range    Sodium 138 135 - 145 MMOL/L    Potassium 4.1 3.5 - 5.1 MMOL/L    Chloride 101 99 - 110 mMol/L    CO2 23 21 - 32 MMOL/L    Anion Gap 14 4 - 16    BUN 58 (H) 6 - 23 MG/DL    Creatinine 7.1 (H) 0.9 - 1.3 MG/DL    Glucose 150 (H) 70 - 99 MG/DL    Calcium 7.6 (L) 8.3 - 10.6 MG/DL    GFR Non- 8 (L) >60 mL/min/1.73m2    GFR  10 (L) >60 mL/min/1.73m2   CBC with Auto Differential   Result Value Ref Range    WBC 5.9 4.0 - 10.5 K/CU MM    RBC 3.21 (L) 4.6 - 6.2 M/CU MM    Hemoglobin 9.2 (L) 13.5 - 18.0 GM/DL    Hematocrit 29.3 (L) 42 - 52 %    MCV 91.3 78 - 100 FL    MCH 28.7 27 - 31 PG    MCHC 31.4 (L) 32.0 - 36.0 %    RDW 14.6 11.7 - 14.9 %    Platelets 299 747 - 571 K/CU MM    MPV 9.9 7.5 - 11.1 FL    Differential Type AUTOMATED DIFFERENTIAL     Segs Relative 72.0 (H) 36 - 66 %    Lymphocytes % 14.3 (L) 24 - 44 %    Monocytes % 8.4 (H) 0 - 4 %    Eosinophils % 3.9 (H) 0 - 3 %    Basophils % 0.7 0 - 1 %    Segs Absolute 4.3 K/CU MM    Lymphocytes Absolute 0.9 K/CU MM    Monocytes Absolute 0.5 K/CU MM    Eosinophils Absolute 0.2 K/CU MM    Basophils Absolute 0.0 K/CU MM    Nucleated RBC % 0.0 %    Total Nucleated RBC 0.0 K/CU MM    Total Immature Neutrophil 0.04 K/CU MM    Immature Neutrophil % 0.7 (H) 0 - 0.43 %   Troponin   Result Value Ref Range    Troponin T 0.156 (HH) <0.01 NG/ML   Hepatitis B Surface Antigen   Result Value Ref Range    Hepatitis B Surface Ag NON REACTIVE NON REACTIVE   Hepatitis B Surface Antibody   Result Value Ref Range    Hep B S Ab <3.5    POCT Glucose   Result Value Ref Range    POC Glucose 176 (H) 70 - 99 MG/DL   POCT Glucose   Result Value Ref Range    POC Glucose 139 (H) 70 - 99 MG/DL   POCT Glucose   Result Value Ref Range    POC Glucose 258 (H) 70 - 99 MG/DL   POCT Glucose   Result Value Ref Range    POC Glucose 95 70 - 99 MG/DL   POCT Glucose   Result Value Ref Range    POC Glucose 118 (H) 70 - 99 MG/DL   EKG 12 Lead   Result Value Ref Range    Ventricular Rate 86 BPM    Atrial Rate 86 BPM    P-R Interval 182 ms    QRS Duration 80 ms    Q-T Interval 406 ms    QTc Calculation (Bazett) 485 ms    P Axis 13 degrees    R Axis 14 degrees    T Axis 78 degrees Diagnosis       Normal sinus rhythm  Prolonged QT  Abnormal ECG  When compared with ECG of 25-JUL-2022 08:42,  No significant change was found  Confirmed by MILTON Edgar ([id number]) on 9/12/2022 11:17:16 PM        Radiographs (if obtained):  [] The following radiograph was interpreted by myself in the absence of a radiologist:   [x] Radiologist's Report Reviewed:  VL DUP LOWER EXTREMITY ARTERIES BILATERAL   Final Result   Arterial circulation is patent bilaterally with no evidence of arterial   occlusions. Arterial waveforms are mostly triphasic and with some biphasic waveforms. Focal elevated velocity in the right popliteal artery likely demonstrates   mild to moderate stenosis. XR CHEST PORTABLE   Final Result   No acute cardiopulmonary findings. EKG (if obtained): (All EKG's are interpreted by myself in the absence of a cardiologist)  12 lead EKG per my interpretation:  Normal Sinus Rhythm at 86  Axis is   Normal  QTc is  within an acceptable range  There is no specific T wave changes appreciated. There is no specific ST wave changes appreciated. No STEMI    Prior EKG to compare with was available and no clinically significant change in over morphology when compared to prior from 9/6/2022.       Chart review shows recent radiographs:  Echocardiogram limited    Result Date: 9/13/2022  Transthoracic Echocardiography Report (TTE)  Demographics   Patient Name       Joellen Duran     Date of Study       09/13/2022   Date of Birth      1975         Gender              Male   Age                52 year(s)         Race                   Patient Number     [id number]         Room Number         3107   Visit Number       [id number]   Corporate ID       [id number]   Accession Number   [id number]         [facility]   Ordering Physician Neema Langston LV PW Diastolic: 3.54 cm  ml  Septum Diastolic: 0.61 cm LV Volume Systolic: 706  CO: 1.03 l/min            ml  CI: 4.14 l/m*m2           LV EDV/LV EDV Index: 255 LA/Aorta: 1.31                            ml/114 m2LV ESV/LV ESV  LV Area Diastolic: 21.2   Index: 017 ml/48 m2  cm2                       EF Calculated (A4C):  LV Area Systolic: 07.7    81.9 %  cm2                       EF Calculated (2D): 56.5                            %                             LV Length: 9.61 cm                             LVOT: 2.2 cm  Doppler Measurements & Calculations   MV Peak E-Wave: 87.9  AV Peak Velocity: 99.3 cm/s  LVOT Peak Velocity: 105  cm/s                  AV Peak Gradient: 3.94 mmHg  cm/s  MV Peak A-Wave: 76    AV Mean Velocity: 65.1 cm/s  LVOT Mean Velocity: 66  cm/s                  AV Mean Gradient: 2 mmHg     cm/s  MV E/A Ratio: 1.16    AV VTI: 21.5 cm              LVOT Peak Gradient: 4  MV Peak Gradient:     AV Area (Continuity):3.8 cm2 mmHgLVOT Mean Gradient: 2  3.09 mmHg                                          mmHg                        LVOT VTI: 21.5 cm  MV P1/2t: 58 msec  MVA by PHT:3.79 cm2                                                     TR Velocity:86.9 cm/s  MV E' Septal                                       TR Gradient:3.02 mmHg  Velocity: 5.59 cm/s                                PV Peak Velocity: 105  MV E' Lateral                                      cm/s  Velocity: 8.55 cm/s                                PV Peak Gradient: 4.41  MV E/E' septal: 15.72                              mmHg  MV E/E' lateral:  10.28      XR CHEST PORTABLE    Result Date: 9/12/2022  EXAMINATION: ONE XRAY VIEW OF THE CHEST 9/12/2022 9:13 am COMPARISON: 07/25/2022 HISTORY: ORDERING SYSTEM PROVIDED HISTORY: cp at HD TECHNOLOGIST PROVIDED HISTORY: Reason for exam:->cp at HD Reason for Exam: cp at HD Additional signs and symptoms: cp at HD Relevant Medical/Surgical History: cp at HD FINDINGS: Low lung volumes.   Right central venous catheter stable in positioning. No focal consolidation, pleural effusion or pneumothorax. The cardiomediastinal silhouette is stable. No overt pulmonary edema. The osseous structures are stable. No acute cardiopulmonary findings. VL DUP LOWER EXTREMITY ARTERIES BILATERAL    Result Date: 9/12/2022  EXAMINATION: ARTERIAL DUPLEX ULTRASOUND OF THE BILATERAL LOWER EXTREMITIES  9/12/2022 4:54 pm TECHNIQUE: Duplex ultrasound using B-mode/gray scaled imaging, Doppler spectral analysis and color flow Doppler was obtained of the bilateral lower extremities. COMPARISON: None. HISTORY: ORDERING SYSTEM PROVIDED HISTORY: non healing ucler TECHNOLOGIST PROVIDED HISTORY: Reason for exam:->non healing ucler FINDINGS: Arterial waveforms on the right are mostly triphasic with some biphasic waveforms. Arterial waveforms on the left are triphasic in the femoral circulation and biphasic in the popliteal artery and some of the trifurcation vessels, although in the anterior tibial artery there are triphasic waveforms. Right: Flow velocities were measured as follows: Com. Fem. 168 cm/sec Prof.           105 cm/sec SFA Prox. 120 cm/sec SFA Mid. 113 cm/sec SFA Dist.     138 cm/sec Pop.           277 cm/sec PTA           56 cm/sec Peron. 67 cm/sec CAMRON           95 cm/sec Left: Flow velocities were measured as follows: Com. Fem. 223 cm/sec Prof.           75 cm/sec SFA Prox. 140 cm/sec SFA Mid. 100 cm/sec SFA Dist.     116 cm/sec Pop. 102 cm/sec PTA           82 cm/sec Peron. 26 cm/sec CAMRON           102 cm/sec     Arterial circulation is patent bilaterally with no evidence of arterial occlusions. Arterial waveforms are mostly triphasic and with some biphasic waveforms. Focal elevated velocity in the right popliteal artery likely demonstrates mild to moderate stenosis. MDM:  Pt presents as above. Emergent conditions considered.   Presentation prompted initial labs, EKG and imaging as above. EKG with normal sinus rhythm as above. Patient's work-up does demonstrate an abnormal troponin in the setting of his end-stage renal disease. Chemistries are nonemergent. CBC is with chronic anemia. Chest x-ray is negative for acute cardiopulmonary process. I did discuss the case with patient's nephrology team and I spoke with Dr. Anthony Brar and he is quite concerned regarding the patient with how he appeared on dialysis and is recommending admission for further observation and cardiology evaluation. I did consult patient's cardiology team and Dr. Neto Muniz is able to have their team evaluate the patient while in the hospital.  Patient did receive full dose aspirin. Patient discussed with hospitalist team and patient admitted to medicine. I discussed specific signs and symptoms and when to return to the emergency department as well as the need for close outpatient follow-up. Questions sought and answered with the patient. They voice understanding and agree with plan. Is this patient to be included in the SEP-1 Core Measure due to severe sepsis or septic shock? No   Exclusion criteria - the patient is NOT to be included for SEP-1 Core Measure due to: Infection is not suspected      Clinical Impression:  1. Chest pain, unspecified type      Disposition referral (if applicable):  No follow-up provider specified. Disposition medications (if applicable):  Current Discharge Medication List          Comment: Please note this report has been produced using speech recognition software and may contain errors related to that system including errors in grammar, punctuation, and spelling, as well as words and phrases that may be inappropriate. If there are any questions or concerns please feel free to contact the dictating provider for clarification.        María Elena Valencia MD  09/13/22 [id number]

## 2022-09-15 ENCOUNTER — HOSPITAL ENCOUNTER (OUTPATIENT)
Dept: WOUND CARE | Age: 47
Discharge: HOME | End: 2022-09-15
Payer: MEDICARE

## 2022-09-15 VITALS
DIASTOLIC BLOOD PRESSURE: 56 MMHG | SYSTOLIC BLOOD PRESSURE: 91 MMHG | TEMPERATURE: 98.2 F | RESPIRATION RATE: 18 BRPM | HEART RATE: 95 BPM

## 2022-09-15 DIAGNOSIS — E11.42 DIABETIC POLYNEUROPATHY ASSOCIATED WITH TYPE 2 DIABETES MELLITUS (HCC): ICD-10-CM

## 2022-09-15 DIAGNOSIS — L97.509 ULCER OF FOREFOOT DUE TO TYPE 2 DIABETES MELLITUS (HCC): Primary | ICD-10-CM

## 2022-09-15 DIAGNOSIS — E11.621 ULCER OF FOREFOOT DUE TO TYPE 2 DIABETES MELLITUS (HCC): Primary | ICD-10-CM

## 2022-09-15 PROCEDURE — 11042 DBRDMT SUBQ TIS 1ST 20SQCM/<: CPT | Performed by: SURGERY

## 2022-09-15 PROCEDURE — 11042 DBRDMT SUBQ TIS 1ST 20SQCM/<: CPT

## 2022-09-15 RX ORDER — BACITRACIN, NEOMYCIN, POLYMYXIN B 400; 3.5; 5 [USP'U]/G; MG/G; [USP'U]/G
OINTMENT TOPICAL ONCE
Status: CANCELLED | OUTPATIENT
Start: 2022-09-15 | End: 2022-09-15

## 2022-09-15 RX ORDER — GENTAMICIN SULFATE 1 MG/G
OINTMENT TOPICAL ONCE
Status: CANCELLED | OUTPATIENT
Start: 2022-09-15 | End: 2022-09-15

## 2022-09-15 RX ORDER — BETAMETHASONE DIPROPIONATE 0.05 %
OINTMENT (GRAM) TOPICAL ONCE
Status: CANCELLED | OUTPATIENT
Start: 2022-09-15 | End: 2022-09-15

## 2022-09-15 RX ORDER — LIDOCAINE 50 MG/G
OINTMENT TOPICAL ONCE
Status: CANCELLED | OUTPATIENT
Start: 2022-09-15 | End: 2022-09-15

## 2022-09-15 RX ORDER — GINSENG 100 MG
CAPSULE ORAL ONCE
Status: CANCELLED | OUTPATIENT
Start: 2022-09-15 | End: 2022-09-15

## 2022-09-15 RX ORDER — LIDOCAINE 40 MG/G
CREAM TOPICAL ONCE
Status: CANCELLED | OUTPATIENT
Start: 2022-09-15 | End: 2022-09-15

## 2022-09-15 RX ORDER — CLOBETASOL PROPIONATE 0.5 MG/G
OINTMENT TOPICAL ONCE
Status: CANCELLED | OUTPATIENT
Start: 2022-09-15 | End: 2022-09-15

## 2022-09-15 RX ORDER — LIDOCAINE HYDROCHLORIDE 40 MG/ML
SOLUTION TOPICAL ONCE
Status: CANCELLED | OUTPATIENT
Start: 2022-09-15 | End: 2022-09-15

## 2022-09-15 RX ORDER — BACITRACIN ZINC AND POLYMYXIN B SULFATE 500; 1000 [USP'U]/G; [USP'U]/G
OINTMENT TOPICAL ONCE
Status: CANCELLED | OUTPATIENT
Start: 2022-09-15 | End: 2022-09-15

## 2022-09-15 ASSESSMENT — PAIN DESCRIPTION - FREQUENCY: FREQUENCY: CONTINUOUS

## 2022-09-15 ASSESSMENT — PAIN SCALES - GENERAL: PAINLEVEL_OUTOF10: 7

## 2022-09-15 ASSESSMENT — PAIN DESCRIPTION - ORIENTATION: ORIENTATION: LEFT

## 2022-09-15 ASSESSMENT — PAIN DESCRIPTION - ONSET: ONSET: ON-GOING

## 2022-09-15 ASSESSMENT — PAIN DESCRIPTION - DESCRIPTORS: DESCRIPTORS: THROBBING

## 2022-09-15 ASSESSMENT — PAIN DESCRIPTION - LOCATION: LOCATION: FOOT

## 2022-09-15 ASSESSMENT — PAIN - FUNCTIONAL ASSESSMENT: PAIN_FUNCTIONAL_ASSESSMENT: PREVENTS OR INTERFERES SOME ACTIVE ACTIVITIES AND ADLS

## 2022-09-15 ASSESSMENT — PAIN DESCRIPTION - PAIN TYPE: TYPE: CHRONIC PAIN

## 2022-09-15 NOTE — DISCHARGE INSTRUCTIONS
PHYSICIAN ORDERS AND DISCHARGE INSTRUCTIONS     Wound cleansing:              Do not scrub or use excessive force. Wash hands with soap and water before and after dressing changes. Prior to applying a clean dressing, cleanse wound with normal saline,               wound cleanser, or mild soap and water. Ask the physician or nurse before getting the wound(s) wet in a shower     Daily Wound management:             Keep weight off wounds and reposition every 2 hours. Avoid standing for long periods of time. Apply wraps/stockings in AM and remove at bedtime. If swelling is present, elevate legs to the level of the heart or above for              30 minutes 4-5 times a day and/or when sitting. When taking antibiotics take entire prescription as ordered by              physician do not stop taking until medicine is all gone. Grafts:                   Wound Care Notes:  Rx:    Cultures:                                          Orders for this week: 22                 Left plantar wound: Wash with soap and water, pat dry. Apply anasept gel and stimulen to wound bed. Cover with ca alginate and foam  Wrap with conform  Secure with tape. Change daily. Give forefoot offloading shoe today in clinic 22 for left foot        Follow Up Instructions: At the [address] in 1 week   Primary Wound Care Provider: Dr Sheryle Ege   Call  for any questions or concerns.   Central [phone] for imaging lab work

## 2022-09-15 NOTE — PROGRESS NOTES
Wound Care Center Progress Note With Procedure    Holland Pratt  AGE: 52 y.o. GENDER: male  : 1975  EPISODE DATE:  9/15/2022     Subjective:     Chief Complaint   Patient presents with    Wound Check     Left Foot         HISTORY of PRESENT ILLNESS     Holland Pratt is a 52 y.o. male who presents to the [address] [place] [address] for  evaluation and treatment of Acute on chronic diabetic  ulcer(s) of  Lt. foot forefoot. The condition is of moderate severity. The ulcer has been present for about 4 weeks on presentation (22). The underlying cause is thought to be diabetes, neuropathy. He has been seeing Dr. Odalis Odonnell for his feet, but has not been to see him in the last few months. The patient has significant underlying medical conditions as below. Doing ok this week. Kept it elevated more this week, but hadn't realized he was supposed to be changing it daily. Wound Pain Timing/Severity: waxing and waning, moderate  Quality of pain: dull, aching  Severity of pain:  3 / 10   Modifying Factors: venous stasis, diabetes, shear force, obesity, and anticoagulation therapy  Associated Signs/Symptoms: edema, numbness, and pain     Patient educated on the 6 essential components necessary for wound healing: Circulation, Debridements, Proper Dressings and Topical Wound Products, Infection Control, Edema Control and Offloading. Patient educated on those factors that negatively effect or impact wound healing: smoking, obesity, uncontrolled diabetes, anticoagulant and immunosuppressive regimens, inadequate nutrition, untreated arterial and venous disease if applicable and measures to manage edema.          PAST MEDICAL HISTORY        Diagnosis Date    2010    scrotal    Acute renal failure (ARF) ([facility] 2019    Anxiety associated with depression     Anxiety associated with depression     Back pain 2012    Chest pain 2013    Diabetic nephropathy ([facility]     Diabetic neuropathy ([facility] 12/22/2011    Diabetic ulcer of left midfoot associated with type 2 diabetes mellitus, with fat layer exposed ([address] 07/18/2017    Diverticulosis     C scope + Dr. Faby Harmon    DM (diabetes mellitus), type 2 ([address] 2002    DR. 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 Faby Harmon    Necrotizing fasciitis [facility])     Nose fracture 1988    Panic attacks     Pericarditis 2003    Hospitalized with s/p heart cath normal    Peripheral autonomic neuropathy due to diabetes mellitus ([address]     Axonal EMG- NCS, March 2011    Sebaceous cyst 09/01/2011    URI (upper respiratory infection) 02/27/2012    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 type 2 diabetes mellitus, with muscle involvement without evidence of necrosis ([address] 9/21/2021    WD-Wound, surgical, infected, initial encounter 12/08/2017    Wrist fracture 1986       PAST SURGICAL HISTORY    Past Surgical History:   Procedure Laterality Date    [facility]  2003, 2013    Normal (Dr. Kierra Garcia)    COLONOSCOPY  06/02/2011    Pandiverticulosis, Nonbleeding internal hemorrhoids, Repeat colonoscopy at age 48- Dr. Sugar Josue      x 3    FOOT SURGERY Left 12/21/2021    EXCISION OF HEAD LEFT 2ND METATARSAL performed by Vidal Acuña DPM at [address] NONTUNNELED VASCULAR CATHETER  07/25/2022    IR NONTUNNELED VASCULAR CATHETER 7/25/2022 [facility] SPECIAL PROCEDURES    IR TUNNELED CATHETER PLACEMENT GREATER THAN 5 YEARS  07/27/2022    IR TUNNELED CATHETER PLACEMENT GREATER THAN 5 YEARS 7/27/2022 [address]    NOSE SURGERY  1993    \"had reconstruction surgery on nose a year after the other nose surgery\"    OTHER SURGICAL HISTORY Right 05/22/2015    I & D right great toe with partial amputation Use    Vaping Use: Never used   Substance Use Topics    Alcohol use: Not Currently     Comment: occ    Drug use: Yes     Frequency: 7.0 times per week     Types: Marijuana Teena Lux)     Comment: 12/20/21 @ 2000       ALLERGIES    Allergies   Allergen Reactions    Adhesive Tape Rash    Doxycycline Nausea And Vomiting    Reglan [Metoclopramide] Anxiety       MEDICATIONS    Current Outpatient Medications on File Prior to Encounter   Medication Sig Dispense Refill    atorvastatin (LIPITOR) 40 MG tablet Take 1 tablet by mouth nightly 30 tablet 3    carvedilol (COREG) 12.5 MG tablet Take 1 tablet by mouth 2 times daily (with meals) 60 tablet 3    [START ON 9/16/2022] epoetin paola-epbx (RETACRIT) 60067 UNIT/ML SOLN injection Inject 1 mL into the skin three times a week To be given in HD by nurse 6.6 mL 1    Multiple Vitamins-Minerals (THERAPEUTIC MULTIVITAMIN-MINERALS) tablet Take 1 tablet by mouth daily 30 tablet 1    amLODIPine (NORVASC) 10 MG tablet Take 1 tablet by mouth daily 30 tablet 3    B Complex-C-Folic Acid (ALEX-JEFFERSON) TABS Take 1 tablet by mouth daily      methocarbamol (ROBAXIN) 500 MG tablet Take 500 mg by mouth nightly      sevelamer (RENVELA) 800 MG tablet Take 800 mg by mouth 3 times daily (with meals)      bacitracin 500 UNIT/GM ointment Apply topically      ranolazine (RANEXA) 500 MG extended release tablet Take 1 tablet by mouth 2 times daily 60 tablet 3    aspirin 81 MG EC tablet Take 1 tablet by mouth daily 30 tablet 0    clopidogrel (PLAVIX) 75 MG tablet Take 1 tablet by mouth daily 30 tablet 0    [DISCONTINUED] metoprolol tartrate (LOPRESSOR) 25 MG tablet Take 0.5 tablets by mouth 2 times daily 30 tablet 0    oxyCODONE-acetaminophen (PERCOCET) 5-325 MG per tablet Take 1.5 tablets by mouth daily. traZODone (DESYREL) 50 MG tablet Take 1 tablet by mouth nightly as needed for Sleep 30 tablet 0    alogliptin (NESINA) 6.25 MG TABS tablet Take 1 tablet by mouth in the morning.  30 tablet 0    insulin lispro, 1 Unit Dial, (HUMALOG KWIKPEN) 100 UNIT/ML SOPN Inject 20 Units into the skin in the morning and 20 Units at noon and 20 Units in the evening. Inject before meals. (Patient taking differently: Inject 35 Units into the skin 3 times daily (before meals)) 5 pen 0    insulin glargine (LANTUS SOLOSTAR) 100 UNIT/ML injection pen Inject 45 Units into the skin nightly (Patient taking differently: Inject 75 Units into the skin nightly) 5 pen 0    ARIPiprazole (ABILIFY) 5 MG tablet Take 1 tablet by mouth at bedtime 30 tablet 0    pantoprazole (PROTONIX) 40 MG tablet Take 1 tablet by mouth in the morning and 1 tablet in the evening. Take before meals. 30 tablet 0    sucralfate (CARAFATE) 1 GM tablet Take 1 tablet by mouth in the morning and 1 tablet in the evening. Take before meals. (Patient not taking: No sig reported) 120 tablet 3    calcitRIOL (ROCALTROL) 0.25 MCG capsule Take 1 capsule by mouth in the morning. 30 capsule 0    [DISCONTINUED] furosemide (LASIX) 40 MG tablet Take 1 tablet by mouth daily 30 tablet 1    [DISCONTINUED] insulin aspart (NOVOLOG) 100 UNIT/ML injection vial Inject 35 Units into the skin 3 times daily (before meals)      [DISCONTINUED] chlorthalidone (HYGROTON) 25 MG tablet Take 1 tablet by mouth daily 30 tablet 3    [DISCONTINUED] linagliptin (TRADJENTA) 5 MG tablet Take 1 tablet by mouth daily 30 tablet 5    ondansetron (ZOFRAN) 4 MG tablet Take 1 tablet by mouth every 8 hours as needed for Nausea or Vomiting 20 tablet 0    Lancets MISC 1 each by Does not apply route daily 100 each 3    glucose monitoring kit (FREESTYLE) monitoring kit 1 each by Does not apply route once for 1 dose. 1 kit 0     No current facility-administered medications on file prior to encounter. REVIEW OF SYSTEMS    Pertinent items are noted in HPI. Constitutional: Negative for systemic symptoms including fever, chills and malaise.       Objective:      BP (!) 91/56   Pulse 95   Temp 98.2 °F (36.8 °C) (Temporal)   Resp 18     PHYSICAL EXAM  General: The patient is in no acute distress. Mental status:  Patient is appropriate, is  oriented to place and plan of care. Dermatologic exam: Visual inspection of the periwound reveals the skin to be well hydrated  Wound exam: see wound description below in procedure note      Assessment:     Problem List Items Addressed This Visit          Endocrine    Diabetic neuropathy ([facility] (Chronic)    Relevant Orders    Initiate Outpatient Wound Care Protocol    WD - Ulcer of forefoot due to type 2 diabetes mellitus ([facility] - Primary    Relevant Orders    Initiate Outpatient Wound Care Protocol     Procedure Note    Indications:  Based on my examination of this patient's wound(s) today, sharp excision into necrotic epidermis, dermis, and subcutaneous tissue is required to promote healing and evaluate the extent of previous healing. Performed by: Ginger Wheat MD    Consent obtained: Yes    Time out taken:  Yes    Pain Control: topical lidocaine       Debridement:Excisional Debridement    Using curette the wound(s) was/were sharply debrided down through and including the removal of epidermis, dermis, and subcutaneous tissue.         Devitalized Tissue Debrided:  fibrin, necrotic/eschar, and callus    Pre Debridement Measurements:  Are located in the Wound Documentation Flow Sheet    All active wounds listed below with today's date are evaluated  Wound(s)    debrided this date include # : 1     Post  Debridement Measurements:  Wound 12/08/17 #3 abdoment (onset 3 days ago) SURGICAL (Active)   Number of days: 1742       Wound 12/08/17 #4 Lt plantar (onset 6 months ago) DIABETIC ALCALA 1 (Active)   Number of days: 1742       Wound 05/18/18 # 5 LEFT PLANTAR (ONSET 1 YEAR AGO) DM WAG 1 (Active)   Number of days: 0631       Wound 09/17/18 Left dorsal foot and 4th toe amp site 9/19/18 (Active)   Number of days: 1458       Incision 05/22/15 Foot Right (Active)   Number of days: 2673       Incision 12/04/17 Abdomen Mid (Active)   Number of days: 0121       Wound 01/11/19 left plantar foot wound (Active)   Number of days: 1621       Wound 03/21/19 Toe (Comment  which one) Anterior;Right (Active)   Number of days: 1031       Wound 03/21/19 Foot Left;Posterior hard callus area (Active)   Number of days: 8855       Wound 03/24/21 Left;Plantar (Active)   Number of days: 540       Wound 05/13/21 Left;Plantar (Active)   Number of days: 490       Wound 05/17/21 Groin Right scrotum extends to lower buttock (Active)   Number of days: 486       Wound 09/01/22 #1 Left Plantar (Active)   Wound Image   09/15/22 1529   Wound Etiology Diabetic 09/15/22 1529   Dressing Status New dressing applied 09/15/22 1626   Wound Cleansed Soap and water; Wound cleanser 09/15/22 1529   Offloading for Diabetic Foot Ulcers Forefoot offloading shoe 09/15/22 1626   Dressing Change Due 09/15/22 09/13/22 1704   Wound Length (cm) 0.6 cm 09/15/22 1529   Wound Width (cm) 0.6 cm 09/15/22 1529   Wound Depth (cm) 0.2 cm 09/15/22 1529   Wound Surface Area (cm^2) 0.36 cm^2 09/15/22 1529   Change in Wound Size % (l*w) 93.45 09/15/22 1529   Wound Volume (cm^3) 0.072 cm^3 09/15/22 1529   Wound Healing % 87 09/15/22 1529   Post-Procedure Length (cm) 0.6 cm 09/15/22 1604   Post-Procedure Width (cm) 0.6 cm 09/15/22 1604   Post-Procedure Depth (cm) 0.2 cm 09/15/22 1604   Post-Procedure Surface Area (cm^2) 0.36 cm^2 09/15/22 1604   Post-Procedure Volume (cm^3) 0.072 cm^3 09/15/22 1604   Distance Tunneling (cm) 0 cm 09/15/22 1529   Tunneling Position ___ O'Clock 0 09/15/22 1529   Undermining Starts ___ O'Clock 0 09/15/22 1529   Undermining Ends___ O'Clock 0 09/15/22 1529   Undermining Maxium Distance (cm) 0 09/15/22 1529   Wound Assessment Pink/red 09/15/22 1529   Drainage Amount Small 09/15/22 1529   Drainage Description Yellow 09/15/22 1529   Odor None 09/15/22 1529   Shazia-wound Assessment Hyperkeratosis (callous) 09/15/22 1529   Margins Defined edges 09/15/22 1529   Wound Thickness Description not for Pressure Injury Full thickness 09/15/22 1529   Number of days: 14       Percent of Wound(s) Debrided: approximately 100%    Total  Area  Debrided:  <1 sq cm     Bleeding:  Minimal    Hemostasis Achieved:  by pressure    Procedural Pain:  0  / 10     Post Procedural Pain:  0 / 10     Response to treatment:  Well tolerated by patient. Status of wound progress and description from last visit:    Improving in size . Mildly macerated but reports he didn't realize he needs to change it daily. Will continue a forefoot offloading shoe    Plan:       Discharge Instructions         PHYSICIAN ORDERS AND DISCHARGE INSTRUCTIONS     Wound cleansing:              Do not scrub or use excessive force. Wash hands with soap and water before and after dressing changes. Prior to applying a clean dressing, cleanse wound with normal saline,               wound cleanser, or mild soap and water. Ask the physician or nurse before getting the wound(s) wet in a shower     Daily Wound management:             Keep weight off wounds and reposition every 2 hours. Avoid standing for long periods of time. Apply wraps/stockings in AM and remove at bedtime. If swelling is present, elevate legs to the level of the heart or above for              30 minutes 4-5 times a day and/or when sitting. When taking antibiotics take entire prescription as ordered by              physician do not stop taking until medicine is all gone. Grafts:                   Wound Care Notes:  Rx:    Cultures:                                          Orders for this week: 09/08/22                 Left plantar wound: Wash with soap and water, pat dry. Apply anasept gel and stimulen to wound bed. Cover with ca alginate and foam  Wrap with conform  Secure with tape.   Change daily.     Give forefoot offloading shoe today in clinic 22 for left foot        Follow Up Instructions: At the [address] in 1 week   Primary Wound Care Provider: Dr Little Mejía   Call  for any questions or concerns.   Central [phone] for imaging lab work        Treatment Note Wound 22 #1 Left Plantar-Dressing/Treatment:  (anasept gel stimulen ca alg foam conform tape.)    Written Patient Dismissal Instructions Given            Electronically signed by Julito Charlton MD on 9/15/2022 at 4:29 PM

## 2022-09-17 LAB
IRON: 69 UG/DL (ref 59–158)
PCT TRANSFERRIN: 36 % (ref 10–44)
TOTAL IRON BINDING CAPACITY: 194 UG/DL (ref 250–450)
UNSATURATED IRON BINDING CAPACITY: 125 UG/DL (ref 110–370)

## 2022-09-19 ENCOUNTER — HOSPITAL ENCOUNTER (OUTPATIENT)
Age: 47
Setting detail: SPECIMEN
Discharge: HOME | End: 2022-09-19

## 2022-09-19 LAB
ANION GAP SERPL CALCULATED.3IONS-SCNC: 15 MMOL/L (ref 4–16)
BUN BLDV-MCNC: 62 MG/DL (ref 6–23)
CALCIUM SERPL-MCNC: 8 MG/DL (ref 8.3–10.6)
CHLORIDE BLD-SCNC: 100 MMOL/L (ref 99–110)
CO2: 24 MMOL/L (ref 21–32)
CREAT SERPL-MCNC: 7.7 MG/DL (ref 0.9–1.3)
GFR AFRICAN AMERICAN: 9 ML/MIN/1.73M2
GFR NON-AFRICAN AMERICAN: 8 ML/MIN/1.73M2
GLUCOSE BLD-MCNC: 121 MG/DL (ref 70–99)
POTASSIUM SERPL-SCNC: 4.1 MMOL/L (ref 3.5–5.1)
SODIUM BLD-SCNC: 139 MMOL/L (ref 135–145)

## 2022-09-19 PROCEDURE — 80048 BASIC METABOLIC PNL TOTAL CA: CPT

## 2022-09-22 ENCOUNTER — HOSPITAL ENCOUNTER (OUTPATIENT)
Age: 47
Discharge: HOME | End: 2022-09-22
Payer: MEDICARE

## 2022-09-22 ENCOUNTER — HOSPITAL ENCOUNTER (OUTPATIENT)
Dept: WOUND CARE | Age: 47
Discharge: HOME | End: 2022-09-22
Payer: MEDICARE

## 2022-09-22 ENCOUNTER — HOSPITAL ENCOUNTER (OUTPATIENT)
Dept: GENERAL RADIOLOGY | Age: 47
Discharge: HOME | End: 2022-09-22
Payer: MEDICARE

## 2022-09-22 DIAGNOSIS — M51.36 ANNULAR TEAR OF LUMBAR DISC: ICD-10-CM

## 2022-09-22 DIAGNOSIS — E11.621 ULCER OF FOREFOOT DUE TO TYPE 2 DIABETES MELLITUS (HCC): Primary | ICD-10-CM

## 2022-09-22 DIAGNOSIS — E11.42 DIABETIC POLYNEUROPATHY ASSOCIATED WITH TYPE 2 DIABETES MELLITUS (HCC): ICD-10-CM

## 2022-09-22 DIAGNOSIS — M54.51 VERTEBROGENIC LOW BACK PAIN: ICD-10-CM

## 2022-09-22 DIAGNOSIS — L97.509 ULCER OF FOREFOOT DUE TO TYPE 2 DIABETES MELLITUS (HCC): Primary | ICD-10-CM

## 2022-09-22 LAB
ALBUMIN SERPL-MCNC: 3.5 GM/DL (ref 3.4–5)
ALP BLD-CCNC: 75 IU/L (ref 40–129)
ALT SERPL-CCNC: 12 U/L (ref 10–40)
AST SERPL-CCNC: 14 IU/L (ref 15–37)
BILIRUB SERPL-MCNC: 0.2 MG/DL (ref 0–1)
BILIRUBIN DIRECT: 0.2 MG/DL (ref 0–0.3)
BILIRUBIN, INDIRECT: 0 MG/DL (ref 0–0.7)
CHOLESTEROL: 166 MG/DL
ERYTHROCYTE SEDIMENTATION RATE: 20 MM/HR (ref 0–15)
HDLC SERPL-MCNC: 30 MG/DL
LDL CHOLESTEROL CALCULATED: 85 MG/DL
MAGNESIUM: 2 MG/DL (ref 1.8–2.4)
PROSTATE SPECIFIC ANTIGEN: 0.22 NG/ML (ref 0–4)
T4 FREE: 1.06 NG/DL (ref 0.9–1.8)
TOTAL PROTEIN: 6.8 GM/DL (ref 6.4–8.2)
TRIGL SERPL-MCNC: 254 MG/DL
TSH HIGH SENSITIVITY: 3.99 UIU/ML (ref 0.27–4.2)

## 2022-09-22 PROCEDURE — 84439 ASSAY OF FREE THYROXINE: CPT

## 2022-09-22 PROCEDURE — 36415 COLL VENOUS BLD VENIPUNCTURE: CPT

## 2022-09-22 PROCEDURE — 11042 DBRDMT SUBQ TIS 1ST 20SQCM/<: CPT

## 2022-09-22 PROCEDURE — 80061 LIPID PANEL: CPT

## 2022-09-22 PROCEDURE — 83735 ASSAY OF MAGNESIUM: CPT

## 2022-09-22 PROCEDURE — 80076 HEPATIC FUNCTION PANEL: CPT

## 2022-09-22 PROCEDURE — G0103 PSA SCREENING: HCPCS

## 2022-09-22 PROCEDURE — 85652 RBC SED RATE AUTOMATED: CPT

## 2022-09-22 PROCEDURE — 11042 DBRDMT SUBQ TIS 1ST 20SQCM/<: CPT | Performed by: SURGERY

## 2022-09-22 PROCEDURE — 84443 ASSAY THYROID STIM HORMONE: CPT

## 2022-09-22 PROCEDURE — 72100 X-RAY EXAM L-S SPINE 2/3 VWS: CPT

## 2022-09-22 RX ORDER — LIDOCAINE 50 MG/G
OINTMENT TOPICAL ONCE
OUTPATIENT
Start: 2022-09-22 | End: 2022-09-22

## 2022-09-22 RX ORDER — LIDOCAINE 40 MG/G
CREAM TOPICAL ONCE
OUTPATIENT
Start: 2022-09-22 | End: 2022-09-22

## 2022-09-22 RX ORDER — BETAMETHASONE DIPROPIONATE 0.05 %
OINTMENT (GRAM) TOPICAL ONCE
OUTPATIENT
Start: 2022-09-22 | End: 2022-09-22

## 2022-09-22 RX ORDER — LIDOCAINE HYDROCHLORIDE 40 MG/ML
SOLUTION TOPICAL ONCE
OUTPATIENT
Start: 2022-09-22 | End: 2022-09-22

## 2022-09-22 RX ORDER — GENTAMICIN SULFATE 1 MG/G
OINTMENT TOPICAL ONCE
OUTPATIENT
Start: 2022-09-22 | End: 2022-09-22

## 2022-09-22 RX ORDER — BACITRACIN, NEOMYCIN, POLYMYXIN B 400; 3.5; 5 [USP'U]/G; MG/G; [USP'U]/G
OINTMENT TOPICAL ONCE
OUTPATIENT
Start: 2022-09-22 | End: 2022-09-22

## 2022-09-22 RX ORDER — GINSENG 100 MG
CAPSULE ORAL ONCE
OUTPATIENT
Start: 2022-09-22 | End: 2022-09-22

## 2022-09-22 RX ORDER — BACITRACIN ZINC AND POLYMYXIN B SULFATE 500; 1000 [USP'U]/G; [USP'U]/G
OINTMENT TOPICAL ONCE
OUTPATIENT
Start: 2022-09-22 | End: 2022-09-22

## 2022-09-22 RX ORDER — CLOBETASOL PROPIONATE 0.5 MG/G
OINTMENT TOPICAL ONCE
OUTPATIENT
Start: 2022-09-22 | End: 2022-09-22

## 2022-09-22 NOTE — DISCHARGE INSTRUCTIONS
PHYSICIAN ORDERS AND DISCHARGE INSTRUCTIONS     Wound cleansing:              Do not scrub or use excessive force. Wash hands with soap and water before and after dressing changes. Prior to applying a clean dressing, cleanse wound with normal saline,               wound cleanser, or mild soap and water. Ask the physician or nurse before getting the wound(s) wet in a shower     Daily Wound management:             Keep weight off wounds and reposition every 2 hours. Avoid standing for long periods of time. Apply wraps/stockings in AM and remove at bedtime. If swelling is present, elevate legs to the level of the heart or above for              30 minutes 4-5 times a day and/or when sitting. When taking antibiotics take entire prescription as ordered by              physician do not stop taking until medicine is all gone. Grafts:                   Wound Care Notes:  Rx:    Cultures:                                          Orders for this week: 22                 Left plantar wound: Wash with soap and water, pat dry. Apply anasept gel and stimulen to wound bed. Cover with ca alginate and foam  Wrap with conform  Secure with tape. Change daily. Give forefoot offloading shoe today in clinic 22 for left foot        Follow Up Instructions: At the [address] in 1 week   Primary Wound Care Provider: Dr Sarkar   Call  for any questions or concerns.   Central [phone] for imaging lab work

## 2022-09-22 NOTE — PROGRESS NOTES
associated with type 2 diabetes mellitus, with fat layer exposed ([address] 07/18/2017    Diverticulosis     C scope + Dr. Carole De Guzman    DM (diabetes mellitus), type 2 ([address] 2002    DR. 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 Letha Hinson)    COLONOSCOPY  06/02/2011    Pandiverticulosis, Nonbleeding internal hemorrhoids, Repeat colonoscopy at age 48- Dr. Sriram Quesada      x 3    FOOT SURGERY Left 12/21/2021    EXCISION OF HEAD LEFT 2ND METATARSAL performed by Jose L Pena DPM at [facility] NONTUNNELED VASCULAR CATHETER  07/25/2022    IR NONTUNNELED VASCULAR CATHETER 7/25/2022 [facility] SPECIAL PROCEDURES    IR TUNNELED CATHETER PLACEMENT GREATER THAN 5 YEARS  07/27/2022    IR TUNNELED CATHETER PLACEMENT GREATER THAN 5 YEARS 7/27/2022 [address] [place]    NOSE SURGERY  1993    \"had reconstruction surgery on nose a year after the other nose surgery\"    OTHER SURGICAL HISTORY Right 05/22/2015    I & D right great toe with partial amputation    OTHER SURGICAL HISTORY  12/04/2017    inc and drainage of abcess    ND DEEP INCIS FOOT BONE INFECTN Left 2018    LEFT FOOT DEBRIDEMENT INCISION AND DRAINAGE TOP AND BOTTOM performed by Tim Chaudhari DPM at [facility] N/A 05/15/2021    SCROTAL AND PERINEAL DEBRIDEMENT performed by Mindi Phan MD at [facility] N/A 2021    SCROTAL RE-DEBRIDEMENT  INCISION AND DRAINAGE performed by Mindi Phan MD at [address] N/A 2013    sebaceous cyst removal times 4     TOE AMPUTATION      right great toe    TOE AMPUTATION Right 2019    TOE AMPUTATION RIGHT 5TH TOE performed by Tim Chaudhari DPM at [facility] [address] Right 2019    TOE AMPUTATION RIGHT 4TH TOE performed by Tim Chaudhari DPM at [facility] ENDOSCOPY  2011    Mild gastritis with moderatley severe antritis, small hiatal hernia, Dr. Ana Rosa Ramirez 2019    EGD BIOPSY performed by Leanne Young MD at [place] [address] N/A 2022    EGD DIAGNOSTIC ONLY performed by Leanne Young MD at [facility]    Family History   Problem Relation Age of Onset    Cancer Mother         ?site    Stroke Mother     Bleeding Prob Mother     Diabetes Father     Heart Disease Father     High Blood Pressure Father     Obesity Father     Kidney Disease Father     Diabetes Sister     High Blood Pressure Sister     Mental Illness Sister     Obesity Sister     High Blood Pressure Other     Mental Illness Other         bipolar    Other Son         cyst in ear canal    ADHD Daughter        SOCIAL HISTORY    Social History     Tobacco Use    Smoking status: Former     Years: 20.00     Types: Cigarettes     Quit date: 2022     Years since quittin.2    Smokeless tobacco: Never    Tobacco comments:     Smokes when drinking/social   Vaping Use    Vaping Use: Never used   Substance Use Topics    Alcohol use: Not Currently     Comment: occ    Drug use: Yes     Frequency: 7.0 times per week     Types: Marijuana Berneta Jose L)     Comment: 12/20/21 @ 2000       ALLERGIES    Allergies   Allergen Reactions    Adhesive Tape Rash    Doxycycline Nausea And Vomiting    Reglan [Metoclopramide] Anxiety       MEDICATIONS    Current Outpatient Medications on File Prior to Encounter   Medication Sig Dispense Refill    atorvastatin (LIPITOR) 40 MG tablet Take 1 tablet by mouth nightly 30 tablet 3    carvedilol (COREG) 12.5 MG tablet Take 1 tablet by mouth 2 times daily (with meals) 60 tablet 3    epoetin paola-epbx (RETACRIT) 05723 UNIT/ML SOLN injection Inject 1 mL into the skin three times a week To be given in HD by nurse 6.6 mL 1    Multiple Vitamins-Minerals (THERAPEUTIC MULTIVITAMIN-MINERALS) tablet Take 1 tablet by mouth daily 30 tablet 1    amLODIPine (NORVASC) 10 MG tablet Take 1 tablet by mouth daily 30 tablet 3    B Complex-C-Folic Acid (ALEX-JEFFERSON) TABS Take 1 tablet by mouth daily      methocarbamol (ROBAXIN) 500 MG tablet Take 500 mg by mouth nightly      sevelamer (RENVELA) 800 MG tablet Take 800 mg by mouth 3 times daily (with meals)      bacitracin 500 UNIT/GM ointment Apply topically      ranolazine (RANEXA) 500 MG extended release tablet Take 1 tablet by mouth 2 times daily 60 tablet 3    aspirin 81 MG EC tablet Take 1 tablet by mouth daily 30 tablet 0    clopidogrel (PLAVIX) 75 MG tablet Take 1 tablet by mouth daily 30 tablet 0    [DISCONTINUED] metoprolol tartrate (LOPRESSOR) 25 MG tablet Take 0.5 tablets by mouth 2 times daily 30 tablet 0    oxyCODONE-acetaminophen (PERCOCET) 5-325 MG per tablet Take 1.5 tablets by mouth daily. traZODone (DESYREL) 50 MG tablet Take 1 tablet by mouth nightly as needed for Sleep 30 tablet 0    alogliptin (NESINA) 6.25 MG TABS tablet Take 1 tablet by mouth in the morning.  30 tablet 0    insulin lispro, 1 Unit Dial, (HUMALOG KWIKPEN) 100 UNIT/ML SOPN Inject 20 Units into the skin in the morning and 20 Units at noon and 20 Units in the evening. Inject before meals. (Patient taking differently: Inject 35 Units into the skin 3 times daily (before meals)) 5 pen 0    insulin glargine (LANTUS SOLOSTAR) 100 UNIT/ML injection pen Inject 45 Units into the skin nightly (Patient taking differently: Inject 75 Units into the skin nightly) 5 pen 0    ARIPiprazole (ABILIFY) 5 MG tablet Take 1 tablet by mouth at bedtime 30 tablet 0    pantoprazole (PROTONIX) 40 MG tablet Take 1 tablet by mouth in the morning and 1 tablet in the evening. Take before meals. 30 tablet 0    sucralfate (CARAFATE) 1 GM tablet Take 1 tablet by mouth in the morning and 1 tablet in the evening. Take before meals. (Patient not taking: No sig reported) 120 tablet 3    calcitRIOL (ROCALTROL) 0.25 MCG capsule Take 1 capsule by mouth in the morning. 30 capsule 0    [DISCONTINUED] furosemide (LASIX) 40 MG tablet Take 1 tablet by mouth daily 30 tablet 1    [DISCONTINUED] insulin aspart (NOVOLOG) 100 UNIT/ML injection vial Inject 35 Units into the skin 3 times daily (before meals)      [DISCONTINUED] chlorthalidone (HYGROTON) 25 MG tablet Take 1 tablet by mouth daily 30 tablet 3    [DISCONTINUED] linagliptin (TRADJENTA) 5 MG tablet Take 1 tablet by mouth daily 30 tablet 5    ondansetron (ZOFRAN) 4 MG tablet Take 1 tablet by mouth every 8 hours as needed for Nausea or Vomiting 20 tablet 0    Lancets MISC 1 each by Does not apply route daily 100 each 3    glucose monitoring kit (FREESTYLE) monitoring kit 1 each by Does not apply route once for 1 dose. 1 kit 0     No current facility-administered medications on file prior to encounter. REVIEW OF SYSTEMS    Pertinent items are noted in HPI. Constitutional: Negative for systemic symptoms including fever, chills and malaise. Objective: There were no vitals taken for this visit. PHYSICAL EXAM  General: The patient is in no acute distress.     Mental status: Patient is appropriate, is  oriented to place and plan of care. Dermatologic exam: Visual inspection of the periwound reveals the skin to be normal in turgor and texture  Wound exam: see wound description below in procedure note      Assessment:     Problem List Items Addressed This Visit          Endocrine    Diabetic neuropathy ([facility] (Chronic)    Relevant Orders    Initiate Outpatient Wound Care Protocol    WD - Ulcer of forefoot due to type 2 diabetes mellitus ([facility] - Primary    Relevant Orders    Initiate Outpatient Wound Care Protocol     Procedure Note    Indications:  Based on my examination of this patient's wound(s) today, sharp excision into necrotic epidermis, dermis, and subcutaneous tissue is required to promote healing and evaluate the extent of previous healing. Performed by: Shala Martínez MD    Consent obtained: Yes    Time out taken:  Yes    Pain Control: topical lidocaine       Debridement:Excisional Debridement    Using curette the wound(s) was/were sharply debrided down through and including the removal of epidermis, dermis, and subcutaneous tissue.         Devitalized Tissue Debrided:  fibrin, necrotic/eschar, and callus    Pre Debridement Measurements:  Are located in the Wound Documentation Flow Sheet    All active wounds listed below with today's date are evaluated  Wound(s)    debrided this date include # : 1     Post  Debridement Measurements:  Wound 12/08/17 #3 abdoment (onset 3 days ago) SURGICAL (Active)   Number of days: 1749       Wound 12/08/17 #4 Lt plantar (onset 6 months ago) DIABETIC ALCALA 1 (Active)   Number of days: 1749       Wound 05/18/18 # 5 LEFT PLANTAR (ONSET 1 YEAR AGO) DM WAG 1 (Active)   Number of days: 1588       Wound 09/17/18 Left dorsal foot and 4th toe amp site 9/19/18 (Active)   Number of days: 1465       Incision 05/22/15 Foot Right (Active)   Number of days: 2680       Incision 12/04/17 Abdomen Mid (Active)   Number of days: 9289       Wound 01/11/19 left plantar foot wound (Active)   Number of days: 1350       Wound 03/21/19 Toe (Comment  which one) Anterior;Right (Active)   Number of days: 1281       Wound 03/21/19 Foot Left;Posterior hard callus area (Active)   Number of days: 1281       Wound 03/24/21 Left;Plantar (Active)   Number of days: 547       Wound 05/13/21 Left;Plantar (Active)   Number of days: 497       Wound 05/17/21 Groin Right scrotum extends to lower buttock (Active)   Number of days: 493       Wound 09/01/22 #1 Left Plantar (Active)   Wound Image   09/15/22 1529   Wound Etiology Diabetic 09/22/22 1518   Dressing Status New dressing applied 09/22/22 1603   Wound Cleansed Soap and water; Wound cleanser 09/22/22 1518   Offloading for Diabetic Foot Ulcers Forefoot offloading shoe 09/22/22 1603   Dressing Change Due 09/15/22 09/13/22 1704   Wound Length (cm) 0.7 cm 09/22/22 1518   Wound Width (cm) 0.5 cm 09/22/22 1518   Wound Depth (cm) 0.1 cm 09/22/22 1518   Wound Surface Area (cm^2) 0.35 cm^2 09/22/22 1518   Change in Wound Size % (l*w) 93.64 09/22/22 1518   Wound Volume (cm^3) 0.035 cm^3 09/22/22 1518   Wound Healing % 94 09/22/22 1518   Post-Procedure Length (cm) 0.7 cm 09/22/22 1549   Post-Procedure Width (cm) 0.5 cm 09/22/22 1549   Post-Procedure Depth (cm) 0.1 cm 09/22/22 1549   Post-Procedure Surface Area (cm^2) 0.35 cm^2 09/22/22 1549   Post-Procedure Volume (cm^3) 0.035 cm^3 09/22/22 1549   Distance Tunneling (cm) 0 cm 09/22/22 1518   Tunneling Position ___ O'Clock 0 09/22/22 1518   Undermining Starts ___ O'Clock 0 09/22/22 1518   Undermining Ends___ O'Clock 0 09/22/22 1518   Undermining Maxium Distance (cm) 0 09/22/22 1518   Wound Assessment Pink/red 09/22/22 1518   Drainage Amount Small 09/22/22 1518   Drainage Description Yellow 09/22/22 1518   Odor None 09/22/22 1518   Shazia-wound Assessment Hyperkeratosis (callous) 09/22/22 1518   Margins Defined edges 09/22/22 1518   Wound Thickness Description not for Pressure Injury Full thickness 09/22/22 1518   Number of days: 21       Percent of Wound(s) Debrided: approximately 100%    Total  Area  Debrided:  <1 sq cm     Bleeding:  Minimal    Hemostasis Achieved:  by pressure    Procedural Pain:  0  / 10     Post Procedural Pain:  0 / 10     Response to treatment:  Well tolerated by patient. Status of wound progress and description from last visit:     Measurements similar this week. continue present management     Plan:       Discharge Instructions         PHYSICIAN ORDERS AND DISCHARGE INSTRUCTIONS     Wound cleansing:              Do not scrub or use excessive force. Wash hands with soap and water before and after dressing changes. Prior to applying a clean dressing, cleanse wound with normal saline,               wound cleanser, or mild soap and water. Ask the physician or nurse before getting the wound(s) wet in a shower     Daily Wound management:             Keep weight off wounds and reposition every 2 hours. Avoid standing for long periods of time. Apply wraps/stockings in AM and remove at bedtime. If swelling is present, elevate legs to the level of the heart or above for              30 minutes 4-5 times a day and/or when sitting. When taking antibiotics take entire prescription as ordered by              physician do not stop taking until medicine is all gone. Grafts:                   Wound Care Notes:  Rx:    Cultures:                                          Orders for this week: 09/22/22                 Left plantar wound: Wash with soap and water, pat dry. Apply anasept gel and stimulen to wound bed. Cover with ca alginate and foam  Wrap with conform  Secure with tape. Change daily. Give forefoot offloading shoe today in clinic 9/8/22 for left foot        Follow Up Instructions:  At the [address] [place] [address] in 1 week   Primary Wound Care Provider: Dr Kathe Camp   Call  for any questions or concerns.   Central [phone] for imaging lab work        Treatment Note Wound 22 #1 Left Plantar-Dressing/Treatment:  (anasept gel, stimulen ca alg foam conform tape)    Written Patient Dismissal Instructions Given            Electronically signed by Alexander De Dios MD on 2022 at 4:26 PM

## 2022-09-26 ENCOUNTER — HOSPITAL ENCOUNTER (OUTPATIENT)
Age: 47
Setting detail: SPECIMEN
Discharge: HOME | End: 2022-09-26

## 2022-09-26 LAB
ANION GAP SERPL CALCULATED.3IONS-SCNC: 19 MMOL/L (ref 4–16)
BUN BLDV-MCNC: 67 MG/DL (ref 6–23)
CALCIUM SERPL-MCNC: 7.6 MG/DL (ref 8.3–10.6)
CHLORIDE BLD-SCNC: 99 MMOL/L (ref 99–110)
CO2: 24 MMOL/L (ref 21–32)
CREAT SERPL-MCNC: 7.8 MG/DL (ref 0.9–1.3)
GFR AFRICAN AMERICAN: 9 ML/MIN/1.73M2
GFR NON-AFRICAN AMERICAN: 7 ML/MIN/1.73M2
GLUCOSE BLD-MCNC: 180 MG/DL (ref 70–99)
POTASSIUM SERPL-SCNC: 4.5 MMOL/L (ref 3.5–5.1)
SODIUM BLD-SCNC: 142 MMOL/L (ref 135–145)

## 2022-09-26 PROCEDURE — 80048 BASIC METABOLIC PNL TOTAL CA: CPT

## 2022-10-03 ENCOUNTER — HOSPITAL ENCOUNTER (OUTPATIENT)
Age: 47
Setting detail: SPECIMEN
Discharge: HOME | End: 2022-10-03

## 2022-10-03 LAB
ANION GAP SERPL CALCULATED.3IONS-SCNC: 16 MMOL/L (ref 4–16)
BUN BLDV-MCNC: 62 MG/DL (ref 6–23)
CALCIUM SERPL-MCNC: 7.7 MG/DL (ref 8.3–10.6)
CHLORIDE BLD-SCNC: 100 MMOL/L (ref 99–110)
CO2: 24 MMOL/L (ref 21–32)
CREAT SERPL-MCNC: 7.2 MG/DL (ref 0.9–1.3)
GFR AFRICAN AMERICAN: 10 ML/MIN/1.73M2
GFR NON-AFRICAN AMERICAN: 8 ML/MIN/1.73M2
GLUCOSE BLD-MCNC: 129 MG/DL (ref 70–99)
POTASSIUM SERPL-SCNC: 4 MMOL/L (ref 3.5–5.1)
SODIUM BLD-SCNC: 140 MMOL/L (ref 135–145)

## 2022-10-03 PROCEDURE — 80048 BASIC METABOLIC PNL TOTAL CA: CPT

## 2022-10-25 ENCOUNTER — TELEPHONE (OUTPATIENT)
Dept: CARDIOLOGY CLINIC | Age: 47
End: 2022-10-25

## 2022-10-25 NOTE — TELEPHONE ENCOUNTER
Cardiologist: Dr. Dana Ford  Surgeon: Dr. Ron Byers  Surgery: AV Fistula upper extremity  Anesthesia: MAC  Date: 11/1/2022  FAX# [phone]  # [phone]    Last OV 9/6/2022 w/Stacey      -     CORONARY ARTERY DISEASE:  symptomatic     All available  tests in chart reviewed. Management discussed . Testing ordered  no           On aspirin and Plavix compliant we will continue I also added Ranexa to his drug regimen given that he is having chest pain                       -  LIPID MANAGEMENT:  Importance of lipid levels discussed with patient   and patient was given dietary advice. NCEP- ATP III guidelines reviewed with patient. -   Changes  in medicines made: No     Patient is on Lipitor 40 mg daily we will check the lipid profile                     -   DIABETES MELLITUS: Available pertinent lab data reviewed   and  patient was given dietary advice . Advised to check blood glucose level on a regular basis. -   Changes  in medicines made: No        Patient is on insulin compliant we will check the hemoglobin A1c level      Mortality from the morbid obesity is very high:      Last EKG- 9/12/2022      NM- 7/29/2022  abnormal stress test due to depressed LVEF, normal perfusion, diaphragmatic    attenuation artifact noted         Echo- 9/13/2022   This is a limited echo. Left ventricular systolic function is normal.   Ejection fraction is visually estimated at 50-55%. Moderate left ventricular hypertrophy. No significant valvular disease noted. No evidence of any pericardial effusion.         Cath- 9/13/2022   patent stents in LAD and LCX, LVEDP 28 mmhg        Plavix    Aspirin

## 2022-10-31 ENCOUNTER — HOSPITAL ENCOUNTER (OUTPATIENT)
Age: 47
Setting detail: SPECIMEN
Discharge: HOME | End: 2022-10-31
Payer: MEDICARE

## 2022-10-31 LAB
ANION GAP SERPL CALCULATED.3IONS-SCNC: 13 MMOL/L (ref 4–16)
BASOPHILS ABSOLUTE: 0 K/CU MM
BASOPHILS RELATIVE PERCENT: 0.4 % (ref 0–1)
BUN BLDV-MCNC: 22 MG/DL (ref 6–23)
CALCIUM SERPL-MCNC: 7.4 MG/DL (ref 8.3–10.6)
CHLORIDE BLD-SCNC: 97 MMOL/L (ref 99–110)
CO2: 26 MMOL/L (ref 21–32)
CREAT SERPL-MCNC: 2.7 MG/DL (ref 0.9–1.3)
DIFFERENTIAL TYPE: ABNORMAL
EOSINOPHILS ABSOLUTE: 0.2 K/CU MM
EOSINOPHILS RELATIVE PERCENT: 2.7 % (ref 0–3)
GFR SERPL CREATININE-BSD FRML MDRD: 28 ML/MIN/1.73M2
GLUCOSE BLD-MCNC: 116 MG/DL (ref 70–99)
HCT VFR BLD CALC: 31.7 % (ref 42–52)
HEMOGLOBIN: 10.4 GM/DL (ref 13.5–18)
IMMATURE NEUTROPHIL %: 1.1 % (ref 0–0.43)
LYMPHOCYTES ABSOLUTE: 1 K/CU MM
LYMPHOCYTES RELATIVE PERCENT: 13.3 % (ref 24–44)
MCH RBC QN AUTO: 29.2 PG (ref 27–31)
MCHC RBC AUTO-ENTMCNC: 32.8 % (ref 32–36)
MCV RBC AUTO: 89 FL (ref 78–100)
MONOCYTES ABSOLUTE: 0.7 K/CU MM
MONOCYTES RELATIVE PERCENT: 9.2 % (ref 0–4)
NUCLEATED RBC %: 0 %
PDW BLD-RTO: 14.8 % (ref 11.7–14.9)
PLATELET # BLD: 219 K/CU MM (ref 140–440)
PMV BLD AUTO: 10.3 FL (ref 7.5–11.1)
POTASSIUM SERPL-SCNC: 3.4 MMOL/L (ref 3.5–5.1)
RBC # BLD: 3.56 M/CU MM (ref 4.6–6.2)
SEGMENTED NEUTROPHILS ABSOLUTE COUNT: 5.3 K/CU MM
SEGMENTED NEUTROPHILS RELATIVE PERCENT: 73.3 % (ref 36–66)
SODIUM BLD-SCNC: 136 MMOL/L (ref 135–145)
TOTAL IMMATURE NEUTOROPHIL: 0.08 K/CU MM
TOTAL NUCLEATED RBC: 0 K/CU MM
WBC # BLD: 7.3 K/CU MM (ref 4–10.5)

## 2022-10-31 PROCEDURE — 85025 COMPLETE CBC W/AUTO DIFF WBC: CPT

## 2022-10-31 PROCEDURE — 80048 BASIC METABOLIC PNL TOTAL CA: CPT

## 2022-11-08 ENCOUNTER — HOSPITAL ENCOUNTER (OUTPATIENT)
Dept: WOUND CARE | Age: 47
Discharge: HOME | End: 2022-11-08
Payer: MEDICARE

## 2022-11-08 VITALS
TEMPERATURE: 97.8 F | DIASTOLIC BLOOD PRESSURE: 90 MMHG | RESPIRATION RATE: 20 BRPM | SYSTOLIC BLOOD PRESSURE: 160 MMHG | HEART RATE: 94 BPM

## 2022-11-08 DIAGNOSIS — L97.509 ULCER OF FOREFOOT DUE TO TYPE 2 DIABETES MELLITUS (HCC): Primary | ICD-10-CM

## 2022-11-08 DIAGNOSIS — E11.621 ULCER OF FOREFOOT DUE TO TYPE 2 DIABETES MELLITUS (HCC): Primary | ICD-10-CM

## 2022-11-08 DIAGNOSIS — E11.42 DIABETIC POLYNEUROPATHY ASSOCIATED WITH TYPE 2 DIABETES MELLITUS (HCC): ICD-10-CM

## 2022-11-08 PROCEDURE — 11042 DBRDMT SUBQ TIS 1ST 20SQCM/<: CPT

## 2022-11-08 RX ORDER — GINSENG 100 MG
CAPSULE ORAL ONCE
OUTPATIENT
Start: 2022-11-08 | End: 2022-11-08

## 2022-11-08 RX ORDER — CLOBETASOL PROPIONATE 0.5 MG/G
OINTMENT TOPICAL ONCE
OUTPATIENT
Start: 2022-11-08 | End: 2022-11-08

## 2022-11-08 RX ORDER — LIDOCAINE HYDROCHLORIDE 40 MG/ML
SOLUTION TOPICAL ONCE
OUTPATIENT
Start: 2022-11-08 | End: 2022-11-08

## 2022-11-08 RX ORDER — BACITRACIN ZINC AND POLYMYXIN B SULFATE 500; 1000 [USP'U]/G; [USP'U]/G
OINTMENT TOPICAL ONCE
OUTPATIENT
Start: 2022-11-08 | End: 2022-11-08

## 2022-11-08 RX ORDER — LIDOCAINE 50 MG/G
OINTMENT TOPICAL ONCE
OUTPATIENT
Start: 2022-11-08 | End: 2022-11-08

## 2022-11-08 RX ORDER — BACITRACIN, NEOMYCIN, POLYMYXIN B 400; 3.5; 5 [USP'U]/G; MG/G; [USP'U]/G
OINTMENT TOPICAL ONCE
OUTPATIENT
Start: 2022-11-08 | End: 2022-11-08

## 2022-11-08 RX ORDER — BETAMETHASONE DIPROPIONATE 0.05 %
OINTMENT (GRAM) TOPICAL ONCE
OUTPATIENT
Start: 2022-11-08 | End: 2022-11-08

## 2022-11-08 RX ORDER — GENTAMICIN SULFATE 1 MG/G
OINTMENT TOPICAL ONCE
OUTPATIENT
Start: 2022-11-08 | End: 2022-11-08

## 2022-11-08 RX ORDER — LIDOCAINE 40 MG/G
CREAM TOPICAL ONCE
OUTPATIENT
Start: 2022-11-08 | End: 2022-11-08

## 2022-11-08 ASSESSMENT — PAIN - FUNCTIONAL ASSESSMENT: PAIN_FUNCTIONAL_ASSESSMENT: PREVENTS OR INTERFERES SOME ACTIVE ACTIVITIES AND ADLS

## 2022-11-08 ASSESSMENT — PAIN DESCRIPTION - ONSET: ONSET: ON-GOING

## 2022-11-08 ASSESSMENT — PAIN SCALES - GENERAL: PAINLEVEL_OUTOF10: 5

## 2022-11-08 ASSESSMENT — PAIN DESCRIPTION - DESCRIPTORS: DESCRIPTORS: THROBBING

## 2022-11-08 ASSESSMENT — PAIN DESCRIPTION - LOCATION: LOCATION: FOOT

## 2022-11-08 ASSESSMENT — PAIN DESCRIPTION - ORIENTATION: ORIENTATION: LEFT

## 2022-11-08 ASSESSMENT — PAIN DESCRIPTION - FREQUENCY: FREQUENCY: CONTINUOUS

## 2022-11-08 NOTE — DISCHARGE INSTRUCTIONS
PHYSICIAN ORDERS AND DISCHARGE INSTRUCTIONS     Wound cleansing:              Do not scrub or use excessive force. Wash hands with soap and water before and after dressing changes. Prior to applying a clean dressing, cleanse wound with normal saline,               wound cleanser, or mild soap and water. Ask the physician or nurse before getting the wound(s) wet in a shower     Daily Wound management:             Keep weight off wounds and reposition every 2 hours. Avoid standing for long periods of time. Apply wraps/stockings in AM and remove at bedtime. If swelling is present, elevate legs to the level of the heart or above for              30 minutes 4-5 times a day and/or when sitting. When taking antibiotics take entire prescription as ordered by              physician do not stop taking until medicine is all gone. Grafts:                   Wound Care Notes:  Rx:    Cultures:                                          Orders for this week: 22                 Left plantar wound: Wash with soap and water, pat dry. Apply anasept gel and stimulen to wound bed. Cover with ca alginate and foam  Wrap with conform  Secure with tape. Change daily. Give Forefoot offloader in clinic on 22. Follow Up Instructions: At the [address] [facility] in 1 week   Primary Wound Care Provider: Dr Chester Whitten  Call  for any questions or concerns.   Central [phone] for imaging lab work

## 2022-11-08 NOTE — PROGRESS NOTES
[facility] Center Progress Note With Procedure    Anson Oppenheim  AGE: 52 y.o. GENDER: male  : 1975  EPISODE DATE:  2022     Subjective:     Chief Complaint   Patient presents with    Wound Check     Left foot          HISTORY of PRESENT ILLNESS      Anson Oppenheim is a 52 y.o. male who presents today for wound evaluation of new wound on the bottom of the left foot underneath the big toe. Been present for the past couple of months. Denies any trauma. Does not know what his last hemoglobin A1c was. Does get numbness tingling burning in his feet secondary to this. Says that since I saw him last he has been started on dialysis 3 times a week. Not currently on any antibiotics. Denies any constitutional symptoms          PAST MEDICAL HISTORY        Diagnosis Date    Abscess     scrotal    Acute renal failure (ARF) ([address] 2019    Anxiety associated with depression     Anxiety associated with depression     Back pain 2012    Chest pain 2013    Diabetic nephropathy ([address]     Diabetic neuropathy ([address] 2011    Diabetic ulcer of left midfoot associated with type 2 diabetes mellitus, with fat layer exposed ([address] 2017    Diverticulosis     C scope + Dr. Ramon Gaytan    DM (diabetes mellitus), type 2 ([address]     DR. 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 Ramon Gaytan    Necrotizing fasciitis [facility])     Nose fracture     Panic attacks     Pericarditis     Hospitalized with s/p heart cath normal    Peripheral autonomic neuropathy due to diabetes mellitus ([address]     Axonal EMG- NCS, 2011    Sebaceous cyst 2011    URI (upper respiratory infection) 2012    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 type 2 diabetes mellitus, with muscle involvement without evidence of necrosis ([facility] 9/21/2021    WD-Wound, surgical, infected, initial encounter 12/08/2017    Wrist fracture 1986       PAST SURGICAL HISTORY    Past Surgical History:   Procedure Laterality Date    ABDOMEN SURGERY      CARDIAC CATHETERIZATION  2003, 2013    Normal (Dr. Yola Kidd)    COLONOSCOPY  06/02/2011    Pandiverticulosis, Nonbleeding internal hemorrhoids, Repeat colonoscopy at age 48- Dr. Luna Pro      x 3    FOOT SURGERY Left 12/21/2021    EXCISION OF HEAD LEFT 2ND METATARSAL performed by Lucas Joseph DPM at [address] [facility] NONTUNNELED VASCULAR CATHETER  07/25/2022    IR NONTUNNELED VASCULAR CATHETER 7/25/2022 [facility] SPECIAL PROCEDURES    IR TUNNELED CATHETER PLACEMENT GREATER THAN 5 YEARS  07/27/2022    IR TUNNELED CATHETER PLACEMENT GREATER THAN 5 YEARS 7/27/2022 [address] [place]    NOSE SURGERY  1993    \"had reconstruction surgery on nose a year after the other nose surgery\"    OTHER SURGICAL HISTORY Right 05/22/2015    I & D right great toe with partial amputation    OTHER SURGICAL HISTORY  12/04/2017    inc and drainage of abcess    [place] DEEP INCIS FOOT BONE INFECTN Left 09/22/2018    LEFT FOOT DEBRIDEMENT INCISION AND DRAINAGE TOP AND BOTTOM performed by Felicitas Good DPM at [facility] N/A 05/15/2021    SCROTAL AND PERINEAL DEBRIDEMENT performed by Caryle Sprung, MD at [place] [address] 05/16/2021    SCROTAL RE-DEBRIDEMENT  INCISION AND DRAINAGE performed by Caryle Sprung, MD at [address] [facility] N/A 06/18/2013    sebaceous cyst removal times 4     TOE AMPUTATION      right great toe    TOE AMPUTATION Right 03/23/2019    TOE AMPUTATION RIGHT 5TH TOE performed by Felicitas Good DPM at [address] Right 08/06/2019    TOE AMPUTATION RIGHT 4TH TOE performed by Felicitas Good DPM at [address]  06/02/2011    Mild gastritis with moderatley severe antritis, small hiatal hernia,  [address] 2019    EGD BIOPSY performed by Antony Pinto MD at [place] [address] N/A 2022    EGD DIAGNOSTIC ONLY performed by Antony Pinto MD at [facility] [address] History   Problem Relation Age of Onset    Cancer Mother         ?site    Stroke Mother     Bleeding Prob Mother     Diabetes Father     Heart Disease Father     High Blood Pressure Father     Obesity Father     Kidney Disease Father     Diabetes Sister     High Blood Pressure Sister     Mental Illness Sister     Obesity Sister     High Blood Pressure Other     Mental Illness Other         bipolar    Other Son         cyst in ear canal    ADHD Daughter        SOCIAL HISTORY    Social History     Tobacco Use    Smoking status: Former     Years: 00     Types: Cigarettes     Quit date: 2022     Years since quittin.3    Smokeless tobacco: Never    Tobacco comments:     Smokes when drinking/social   Vaping Use    Vaping Use: Never used   Substance Use Topics    Alcohol use: Not Currently     Comment: occ    Drug use: Yes     Frequency: 7.0 times per week     Types: Marijuana [employer])     Comment: 21 @        ALLERGIES    Allergies   Allergen Reactions    Adhesive Tape Rash    Doxycycline Nausea And Vomiting    Reglan [Metoclopramide] Anxiety       MEDICATIONS    Current Outpatient Medications on File Prior to Encounter   Medication Sig Dispense Refill    atorvastatin (LIPITOR) 40 MG tablet Take 1 tablet by mouth nightly 30 tablet 3    carvedilol (COREG) 12.5 MG tablet Take 1 tablet by mouth 2 times daily (with meals) 60 tablet 3    epoetin paola-epbx (RETACRIT) 19355 UNIT/ML SOLN injection Inject 1 mL into the skin three times a week To be given in HD by nurse 6.6 mL 1    Multiple Vitamins-Minerals (THERAPEUTIC MULTIVITAMIN-MINERALS) tablet Take 1 tablet by mouth daily 30 tablet 1    amLODIPine (NORVASC) 10 MG tablet Take 1 tablet by mouth daily 30 tablet 3    B Complex-C-Folic Acid (ALEX-JEFFERSON) TABS Take 1 tablet by mouth daily      methocarbamol (ROBAXIN) 500 MG tablet Take 500 mg by mouth nightly      sevelamer (RENVELA) 800 MG tablet Take 800 mg by mouth 3 times daily (with meals)      bacitracin 500 UNIT/GM ointment Apply topically      ranolazine (RANEXA) 500 MG extended release tablet Take 1 tablet by mouth 2 times daily 60 tablet 3    aspirin 81 MG EC tablet Take 1 tablet by mouth daily 30 tablet 0    clopidogrel (PLAVIX) 75 MG tablet Take 1 tablet by mouth daily 30 tablet 0    [DISCONTINUED] metoprolol tartrate (LOPRESSOR) 25 MG tablet Take 0.5 tablets by mouth 2 times daily 30 tablet 0    oxyCODONE-acetaminophen (PERCOCET) 5-325 MG per tablet Take 1.5 tablets by mouth daily. traZODone (DESYREL) 50 MG tablet Take 1 tablet by mouth nightly as needed for Sleep 30 tablet 0    alogliptin (NESINA) 6.25 MG TABS tablet Take 1 tablet by mouth in the morning. 30 tablet 0    insulin lispro, 1 Unit Dial, (HUMALOG KWIKPEN) 100 UNIT/ML SOPN Inject 20 Units into the skin in the morning and 20 Units at noon and 20 Units in the evening. Inject before meals. (Patient taking differently: Inject 35 Units into the skin 3 times daily (before meals)) 5 pen 0    insulin glargine (LANTUS SOLOSTAR) 100 UNIT/ML injection pen Inject 45 Units into the skin nightly (Patient taking differently: Inject 75 Units into the skin nightly) 5 pen 0    ARIPiprazole (ABILIFY) 5 MG tablet Take 1 tablet by mouth at bedtime 30 tablet 0    pantoprazole (PROTONIX) 40 MG tablet Take 1 tablet by mouth in the morning and 1 tablet in the evening. Take before meals. 30 tablet 0    sucralfate (CARAFATE) 1 GM tablet Take 1 tablet by mouth in the morning and 1 tablet in the evening. Take before meals.  (Patient not taking: No sig reported) 120 tablet 3    calcitRIOL (ROCALTROL) 0.25 MCG capsule Take 1 capsule by mouth in the morning. 30 capsule 0    [DISCONTINUED] furosemide (LASIX) 40 MG tablet Take 1 tablet by mouth daily 30 tablet 1    [DISCONTINUED] insulin aspart (NOVOLOG) 100 UNIT/ML injection vial Inject 35 Units into the skin 3 times daily (before meals)      [DISCONTINUED] chlorthalidone (HYGROTON) 25 MG tablet Take 1 tablet by mouth daily 30 tablet 3    [DISCONTINUED] linagliptin (TRADJENTA) 5 MG tablet Take 1 tablet by mouth daily 30 tablet 5    ondansetron (ZOFRAN) 4 MG tablet Take 1 tablet by mouth every 8 hours as needed for Nausea or Vomiting 20 tablet 0    Lancets MISC 1 each by Does not apply route daily 100 each 3    glucose monitoring kit (FREESTYLE) monitoring kit 1 each by Does not apply route once for 1 dose. 1 kit 0     No current facility-administered medications on file prior to encounter. REVIEW OF SYSTEMS    Pertinent items are noted in HPI. Constitutional: Negative for systemic symptoms including fever, chills and malaise. Objective:      BP (!) 160/90   Pulse 94   Temp 97.8 °F (36.6 °C) (Temporal)   Resp 20     PHYSICAL EXAM    General: The patient is in no acute distress. Mental status:  Patient is appropriate, is  oriented to place and plan of care. Dermatologic exam: Visual inspection of the periwound reveals the skin to be normal in turgor and texture  Wound exam: see wound description below in procedure note      Assessment:     Problem List Items Addressed This Visit          Endocrine    Diabetic neuropathy ([facility] (Chronic)    Relevant Orders    Initiate Outpatient Wound Care Protocol    WD - Ulcer of forefoot due to type 2 diabetes mellitus ([facility] - Primary    Relevant Orders    Initiate Outpatient Wound Care Protocol     Procedure Note    Indications:  Based on my examination of this patient's wound(s) today, sharp excision into necrotic subcutaneous tissue is required to promote healing and evaluate the extent of previous healing.     Performed by: BENNY Puri obtained: Yes    Time out taken:  Yes    Pain Control: lidocaine      Debridement:Non-excisional Debridement    Using # 10 blade scalpel the wound(s) was/were sharply debrided down through and including the removal of subcutaneous tissue. Devitalized Tissue Debrided:  necrotic/eschar    Pre Debridement Measurements:  Are located in the Wound Documentation Flow Sheet    All active wounds listed below with today's date are evaluated  Wound(s)    debrided this date include # : 1     Post  Debridement Measurements:  Wound 12/08/17 #3 abdoment (onset 3 days ago) SURGICAL (Active)   Number of days: 1796       Wound 12/08/17 #4 Lt plantar (onset 6 months ago) DIABETIC AL[place]A 1 (Active)   Number of days: 1796       Wound 05/18/18 # 5 LEFT PLANTAR (ONSET 1 YEAR AGO) DM WAG 1 (Active)   Number of days: 7393       Wound 09/17/18 Left dorsal foot and 4th toe amp site 9/19/18 (Active)   Number of days: 1512       Incision 05/22/15 Foot Right (Active)   Number of days: 2727       Incision 12/04/17 Abdomen Mid (Active)   Number of days: 5735       Wound 01/11/19 left plantar foot wound (Active)   Number of days: 2767       Wound 03/21/19 Toe (Comment  which one) Anterior;Right (Active)   Number of days: 8514       Wound 03/21/19 Foot Left;Posterior hard callus area (Active)   Number of days: 0537       Wound 03/24/21 Left;Plantar (Active)   Number of days: 300       Wound 05/13/21 Left;Plantar (Active)   Number of days: 286       Wound 05/17/21 Groin Right scrotum extends to lower buttock (Active)   Number of days: 540       Wound 09/01/22 #1 Left Plantar (Active)   Wound Image   11/08/22 1449   Wound Etiology Diabetic 11/08/22 1449   Dressing Status New dressing applied;Clean;Dry; Intact 11/08/22 1511   Wound Cleansed Soap and water 11/08/22 1449   Offloading for Diabetic Foot Ulcers Forefoot offloading shoe 11/08/22 1449   Wound Length (cm) 0.5 cm 11/08/22 1449   Wound Width (cm) 0.3 cm 11/08/22 1449   Wound Depth (cm) 0.1 hours. Avoid standing for long periods of time. Apply wraps/stockings in AM and remove at bedtime. If swelling is present, elevate legs to the level of the heart or above for              30 minutes 4-5 times a day and/or when sitting. When taking antibiotics take entire prescription as ordered by              physician do not stop taking until medicine is all gone. Grafts:                   Wound Care Notes:  Rx:    Cultures:                                          Orders for this week: 22                 Left plantar wound: Wash with soap and water, pat dry. Apply anasept gel and stimulen to wound bed. Cover with ca alginate and foam  Wrap with conform  Secure with tape. Change daily. Give Forefoot offloader in clinic on 22. Follow Up Instructions: At the [address] in 1 week   Primary Wound Care Provider: Dr Clint Jackosn  Call  for any questions or concerns. Central [phone] for imaging lab work        Treatment Note Wound 22 #1 Left Plantar-Dressing/Treatment:  (anasept gel, stimulen powder, Foam, Conform, tape front offload shoe)    Written Patient Dismissal Instructions Given       -Patient was seen, evaluated, and treated today  -Conservative and surgical treatment options discussed in detail with the patient today  -No signs of infection. Monitor off antibiotics  -Chronic stop first metatarsal head wound  -Patient was given forefoot off  on a previous visit but he says that it is not working anymore and that it was damaged  -New forefoot off  dispensed to the patient today  -Stressed importance of tight glycemic control.   Never ambulate barefoot  -Daily local wound care instructions given to the patient  -Any concerns of infection before the next visit go to the emergency room  -Follow-up 2 weeks for recheck       Electronically signed by Doug Buckner DPM on 11/8/2022 at 3:17 PM

## 2022-11-13 ENCOUNTER — APPOINTMENT (OUTPATIENT)
Dept: GENERAL RADIOLOGY | Age: 47
End: 2022-11-13
Payer: MEDICARE

## 2022-11-13 ENCOUNTER — HOSPITAL ENCOUNTER (EMERGENCY)
Age: 47
Discharge: HOME | End: 2022-11-13
Attending: EMERGENCY MEDICINE
Payer: MEDICARE

## 2022-11-13 VITALS
SYSTOLIC BLOOD PRESSURE: 151 MMHG | TEMPERATURE: 98.1 F | WEIGHT: 290 LBS | DIASTOLIC BLOOD PRESSURE: 95 MMHG | RESPIRATION RATE: 19 BRPM | OXYGEN SATURATION: 97 % | HEART RATE: 90 BPM | BODY MASS INDEX: 42.83 KG/M2

## 2022-11-13 DIAGNOSIS — J06.9 VIRAL URI WITH COUGH: Primary | ICD-10-CM

## 2022-11-13 DIAGNOSIS — I10 ESSENTIAL HYPERTENSION: ICD-10-CM

## 2022-11-13 LAB
RAPID INFLUENZA  B AGN: NEGATIVE
RAPID INFLUENZA A AGN: NEGATIVE
SARS-COV-2, NAAT: NOT DETECTED
SOURCE: NORMAL

## 2022-11-13 PROCEDURE — 6370000000 HC RX 637 (ALT 250 FOR IP): Performed by: EMERGENCY MEDICINE

## 2022-11-13 PROCEDURE — 99284 EMERGENCY DEPT VISIT MOD MDM: CPT

## 2022-11-13 PROCEDURE — 87635 SARS-COV-2 COVID-19 AMP PRB: CPT

## 2022-11-13 PROCEDURE — 71045 X-RAY EXAM CHEST 1 VIEW: CPT

## 2022-11-13 PROCEDURE — 87430 STREP A AG IA: CPT

## 2022-11-13 PROCEDURE — 87081 CULTURE SCREEN ONLY: CPT

## 2022-11-13 PROCEDURE — 87804 INFLUENZA ASSAY W/OPTIC: CPT

## 2022-11-13 RX ORDER — BENZONATATE 100 MG/1
100 CAPSULE ORAL 2 TIMES DAILY PRN
Qty: 20 CAPSULE | Refills: 0 | Status: SHIPPED | OUTPATIENT
Start: 2022-11-13 | End: 2022-11-20

## 2022-11-13 RX ORDER — BENZONATATE 100 MG/1
100 CAPSULE ORAL ONCE
Status: COMPLETED | OUTPATIENT
Start: 2022-11-13 | End: 2022-11-13

## 2022-11-13 RX ORDER — GUAIFENESIN/DEXTROMETHORPHAN 100-10MG/5
5 SYRUP ORAL ONCE
Status: COMPLETED | OUTPATIENT
Start: 2022-11-13 | End: 2022-11-13

## 2022-11-13 RX ORDER — GUAIFENESIN/DEXTROMETHORPHAN 100-10MG/5
5 SYRUP ORAL 4 TIMES DAILY PRN
Qty: 120 ML | Refills: 0 | Status: SHIPPED | OUTPATIENT
Start: 2022-11-13 | End: 2022-11-23

## 2022-11-13 RX ADMIN — GUAIFENESIN AND DEXTROMETHORPHAN 5 ML: 100; 10 SYRUP ORAL at 19:28

## 2022-11-13 RX ADMIN — BENZONATATE 100 MG: 100 CAPSULE ORAL at 19:28

## 2022-11-13 ASSESSMENT — PAIN - FUNCTIONAL ASSESSMENT
PAIN_FUNCTIONAL_ASSESSMENT: 0-10
PAIN_FUNCTIONAL_ASSESSMENT: 0-10

## 2022-11-13 ASSESSMENT — PAIN DESCRIPTION - LOCATION
LOCATION: THROAT
LOCATION: THROAT

## 2022-11-13 ASSESSMENT — PAIN SCALES - GENERAL
PAINLEVEL_OUTOF10: 6
PAINLEVEL_OUTOF10: 6

## 2022-11-14 NOTE — ED PROVIDER NOTES
Emergency Department Encounter    Patient: Claudy Gutierrez  MRN: [id number]  : 1975  Date of Evaluation: 2022  ED Provider:  Neetu Brock DO    Triage Chief Complaint:   Cough, Pharyngitis, and Shortness of Breath    Crow:  Claudy Gutierrez is a 52 y.o. male that presents to the emergency department complaint of sore throat and cough for the past 2 days. He states he did get his flu shot. States has not taken any medication no sick contacts no nausea or vomiting. Admits runny nose. Denies any earache. States no chest pain or shortness of breath no tobacco use. Denies any COPD emphysema no home oxygen or inhalers. Denies any abdominal pain dysuria hematuria no dizzy lightheaded syncopal episode. Patient does admit to hypertension diabetes chronic kidney disease. He states history of pneumonia or bronchitis in the past.  Patient here for evaluation.     ROS - see HPI, below listed is current ROS at time of my eval:  General:  No fevers, no chills, no weakness  Eyes:  No recent vison changes, no discharge  ENT: Positive for sore throat, no nasal congestion, no hearing changes  Cardiovascular:  No chest pain, no palpitations  Respiratory:  No shortness of breath, positive for cough, no wheezing  Gastrointestinal:  No pain, no nausea, no vomiting, no diarrhea  Musculoskeletal:  No muscle pain, no joint pain  Skin:  No rash, no pruritis, no easy bruising  Neurologic:  No speech problems, no headache, no extremity numbness, no extremity tingling, no extremity weakness  Psychiatric:  No anxiety  Genitourinary:  No dysuria, no hematuria  Endocrine:  No unexpected weight gain, no unexpected weight loss  Extremities:  no edema, no pain    Past Medical History:   Diagnosis Date    Abscess     scrotal    Acute renal failure (ARF) ([facility] 2019    Anxiety associated with depression     Anxiety associated with depression     Back pain 2012    Chest pain 2013    Diabetic nephropathy [facility])     Diabetic neuropathy ([address] 12/22/2011    Diabetic ulcer of left midfoot associated with type 2 diabetes mellitus, with fat layer exposed ([address] 07/18/2017    Diverticulosis     C scope + Dr. Ashlie Solares    DM (diabetes mellitus), type 2 ([address] 2002    DR. 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 Ashlie Solares    Necrotizing fasciitis [facility])     Nose fracture 1988    Panic attacks     Pericarditis 2003    Hospitalized with s/p heart cath normal    Peripheral autonomic neuropathy due to diabetes mellitus ([address]     Axonal EMG- NCS, March 2011    Sebaceous cyst 09/01/2011    URI (upper respiratory infection) 02/27/2012    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 type 2 diabetes mellitus, with muscle involvement without evidence of necrosis ([address] 9/21/2021    WD-Wound, surgical, infected, initial encounter 12/08/2017    Wrist fracture 1986     Past Surgical History:   Procedure Laterality Date    [facility]  2003, 2013    Normal (Dr. Fiorella Nieves)    COLONOSCOPY  06/02/2011    Pandiverticulosis, Nonbleeding internal hemorrhoids, Repeat colonoscopy at age 48- Dr. Telles Sin      x 3    FOOT SURGERY Left 12/21/2021    EXCISION OF HEAD LEFT 2ND METATARSAL performed by Adela Turpin DPM at [address] NONTUNNELED VASCULAR CATHETER  07/25/2022    IR NONTUNNELED VASCULAR CATHETER 7/25/2022 [facility] SPECIAL PROCEDURES    IR TUNNELED CATHETER PLACEMENT GREATER THAN 5 YEARS  07/27/2022    IR TUNNELED CATHETER PLACEMENT GREATER THAN 5 YEARS 7/27/2022 [address]    NOSE SURGERY  1993    \"had reconstruction surgery on nose a year after the other nose surgery\"    OTHER SURGICAL HISTORY Right 05/22/2015    I & D right great toe with partial amputation    OTHER SURGICAL HISTORY  12/04/2017    inc and drainage of abcess    CO DEEP INCIS FOOT BONE INFECTN Left 09/22/2018    LEFT FOOT DEBRIDEMENT INCISION AND DRAINAGE TOP AND BOTTOM performed by Rosemarie Hector DPM at [facility] N/A 05/15/2021    SCROTAL AND PERINEAL DEBRIDEMENT performed by Monique De Los Santos MD at [facility] N/A 05/16/2021    SCROTAL RE-DEBRIDEMENT  INCISION AND DRAINAGE performed by Monique De Los Santos MD at [address] N/A 06/18/2013    sebaceous cyst removal times 4     TOE AMPUTATION      right great toe    TOE AMPUTATION Right 03/23/2019    TOE AMPUTATION RIGHT 5TH TOE performed by Rosemarie Hector DPM at [address] Right 08/06/2019    TOE AMPUTATION RIGHT 4TH TOE performed by Rosemarie Hector DPM at [address] ENDOSCOPY  06/02/2011    Mild gastritis with moderatley severe antritis, small hiatal hernia, Dr. Becka Colón 01/12/2019    EGD BIOPSY performed by Maya Anderson MD at [address] [facility] N/A 07/26/2022    EGD DIAGNOSTIC ONLY performed by Maya Anderson MD at [facility] ENDOSCOPY     Family History   Problem Relation Age of Onset    Cancer Mother         ?site    Stroke Mother     Bleeding Prob Mother     Diabetes Father     Heart Disease Father     High Blood Pressure Father     Obesity Father     Kidney Disease Father     Diabetes Sister     High Blood Pressure Sister     Mental Illness Sister     Obesity Sister     High Blood Pressure Other     Mental Illness Other         bipolar    Other Son         cyst in ear canal    ADHD Daughter      Social History     Socioeconomic History    Marital status:      Spouse name: Not on file    Number of children: Not on file    Years of education: Not on file    Highest education level: Not on file   Occupational History    Not on file   Tobacco Use Smoking status: Former     Years: 20.00     Types: Cigarettes     Quit date: 2022     Years since quittin.3    Smokeless tobacco: Never    Tobacco comments:     Smokes when drinking/social   Vaping Use    Vaping Use: Never used   Substance and Sexual Activity    Alcohol use: Not Currently     Comment: occ    Drug use: Yes     Frequency: 7.0 times per week     Types: Marijuana [employer])     Comment: 21 @     Sexual activity: Yes     Partners: Female   Other Topics Concern    Not on file   Social History Narrative    Not on file     Social Determinants of Health     Financial Resource Strain: Not on file   Food Insecurity: Not on file   Transportation Needs: Not on file   Physical Activity: Not on file   Stress: Not on file   Social Connections: Not on file   Intimate Partner Violence: Not on file   Housing Stability: Not on file     No current facility-administered medications for this encounter.      Current Outpatient Medications   Medication Sig Dispense Refill    benzonatate (TESSALON) 100 MG capsule Take 1 capsule by mouth 2 times daily as needed for Cough 20 capsule 0    guaiFENesin-dextromethorphan (ROBITUSSIN DM) 100-10 MG/5ML syrup Take 5 mLs by mouth 4 times daily as needed for Cough 120 mL 0    atorvastatin (LIPITOR) 40 MG tablet Take 1 tablet by mouth nightly 30 tablet 3    carvedilol (COREG) 12.5 MG tablet Take 1 tablet by mouth 2 times daily (with meals) 60 tablet 3    epoetin paola-epbx (RETACRIT) 78206 UNIT/ML SOLN injection Inject 1 mL into the skin three times a week To be given in HD by nurse 6.6 mL 1    Multiple Vitamins-Minerals (THERAPEUTIC MULTIVITAMIN-MINERALS) tablet Take 1 tablet by mouth daily 30 tablet 1    amLODIPine (NORVASC) 10 MG tablet Take 1 tablet by mouth daily 30 tablet 3    B Complex-C-Folic Acid (ALEX-JEFFERSON) TABS Take 1 tablet by mouth daily      methocarbamol (ROBAXIN) 500 MG tablet Take 500 mg by mouth nightly      sevelamer (RENVELA) 800 MG tablet Take 800 mg by mouth 3 times daily (with meals)      bacitracin 500 UNIT/GM ointment Apply topically      ranolazine (RANEXA) 500 MG extended release tablet Take 1 tablet by mouth 2 times daily 60 tablet 3    aspirin 81 MG EC tablet Take 1 tablet by mouth daily 30 tablet 0    clopidogrel (PLAVIX) 75 MG tablet Take 1 tablet by mouth daily 30 tablet 0    oxyCODONE-acetaminophen (PERCOCET) 5-325 MG per tablet Take 1.5 tablets by mouth daily. traZODone (DESYREL) 50 MG tablet Take 1 tablet by mouth nightly as needed for Sleep 30 tablet 0    alogliptin (NESINA) 6.25 MG TABS tablet Take 1 tablet by mouth in the morning. 30 tablet 0    insulin lispro, 1 Unit Dial, (HUMALOG KWIKPEN) 100 UNIT/ML SOPN Inject 20 Units into the skin in the morning and 20 Units at noon and 20 Units in the evening. Inject before meals. (Patient taking differently: Inject 35 Units into the skin 3 times daily (before meals)) 5 pen 0    insulin glargine (LANTUS SOLOSTAR) 100 UNIT/ML injection pen Inject 45 Units into the skin nightly (Patient taking differently: Inject 75 Units into the skin nightly) 5 pen 0    ARIPiprazole (ABILIFY) 5 MG tablet Take 1 tablet by mouth at bedtime 30 tablet 0    pantoprazole (PROTONIX) 40 MG tablet Take 1 tablet by mouth in the morning and 1 tablet in the evening. Take before meals. 30 tablet 0    sucralfate (CARAFATE) 1 GM tablet Take 1 tablet by mouth in the morning and 1 tablet in the evening. Take before meals. (Patient not taking: No sig reported) 120 tablet 3    calcitRIOL (ROCALTROL) 0.25 MCG capsule Take 1 capsule by mouth in the morning. 30 capsule 0    ondansetron (ZOFRAN) 4 MG tablet Take 1 tablet by mouth every 8 hours as needed for Nausea or Vomiting 20 tablet 0    Lancets MISC 1 each by Does not apply route daily 100 each 3    glucose monitoring kit (FREESTYLE) monitoring kit 1 each by Does not apply route once for 1 dose.  1 kit 0     Allergies   Allergen Reactions    Adhesive Tape Rash    Doxycycline Nausea And Vomiting    Reglan [Metoclopramide] Anxiety       Nursing Notes Reviewed    Physical Exam:  Triage VS:    ED Triage Vitals   Enc Vitals Group      BP 11/13/22 1801 (!) 181/96      Heart Rate 11/13/22 1801 97      Resp 11/13/22 1801 18      Temp 11/13/22 1801 98.1 °F (36.7 °C)      Temp Source 11/13/22 1801 Oral      SpO2 11/13/22 1801 97 %      Weight 11/13/22 1827 290 lb (131.5 kg)      Height --       Head Circumference --       Peak Flow --       Pain Score --       Pain Loc --       Pain Edu? --       Excl. in [address]? --      BP (!) 151/95   Pulse 90   Temp 98.1 °F (36.7 °C) (Oral)   Resp 19   Wt 290 lb (131.5 kg)   SpO2 97%   BMI 42.83 kg/m²     My pulse ox interpretation is - normal    General appearance:  No acute distress. Skin:  Warm. Dry. Eye:  Extraocular movements intact. Ears, nose, mouth and throat:  Oral mucosa moist   Neck:  Trachea midline. Extremity:  No swelling. Normal ROM     Heart:  Regular rate and rhythm, normal S1 & S2, no extra heart sounds. Perfusion:  intact  Respiratory:  Lungs clear to auscultation bilaterally. Respirations nonlabored. Abdominal:  Normal bowel sounds. Soft. Nontender. Non distended. Back:  No CVA tenderness to palpation     Neurological:  Alert and oriented times 3. No focal neuro deficits.              Psychiatric:  Appropriate    I have reviewed and interpreted all of the currently available lab results from this visit (if applicable):  Results for orders placed or performed during the hospital encounter of 11/13/22   Rapid Flu Swab    Specimen: Nasopharyngeal   Result Value Ref Range    Rapid Influenza A Ag NEGATIVE NEGATIVE    Rapid Influenza B Ag NEGATIVE NEGATIVE   COVID-19, Rapid    Specimen: Nasopharyngeal   Result Value Ref Range    Source UNKNOWN     SARS-CoV-2, NAAT NOT DETECTED NOT DETECTED      Radiographs (if obtained):  Radiologist's Report Reviewed:  XR CHEST PORTABLE    Result Date: 11/13/2022  EXAMINATION: ONE XRAY VIEW OF THE CHEST 11/13/2022 6:20 pm COMPARISON: 09/12/2022 HISTORY: ORDERING SYSTEM PROVIDED HISTORY: cough, shortness of breath TECHNOLOGIST PROVIDED HISTORY: Reason for exam:->cough, shortness of breath Reason for Exam: cough, sob FINDINGS: Right-sided temporary dialysis catheter tip is in the lower SVC. Pulmonary vascular congestion without overt edema or consolidation. No pneumothorax or pleural effusion. Cardiac and mediastinal contours stable. No acute osseous abnormality. Pulmonary vascular congestion without overt edema or consolidation. EKG (if obtained): (All EKG's are interpreted by myself in the absence of a cardiologist)      MDM:  Patient presents to the emergency department complaints of sore throat and cough. Patient was ordered COVID and flu test which are negative. Chest x-ray no acute findings. Patient no acute distress although hypertensive. Blood pressure did improve. Patient with a history of high blood pressure chronic kidney disease. Patient will be placed on Robitussin-DM and Tessalon Perle for cough. He is to follow-up with primary care physician 2 days for reevaluation. Patient to return immediately for any worsening symptoms. All questions answered. Clinical Impression:  1. Viral URI with cough    2.  Essential hypertension      Disposition referral (if applicable):  Doug Kim MD  [address] [place] [address]  [phone]    Schedule an appointment as soon as possible for a visit in 2 days  If symptoms worsen    [facility] Emergency Department  [facility] [id number]  [phone]  Go in 1 day  If symptoms worsen    Disposition medications (if applicable):  New Prescriptions    BENZONATATE (TESSALON) 100 MG CAPSULE    Take 1 capsule by mouth 2 times daily as needed for Cough    GUAIFENESIN-DEXTROMETHORPHAN (ROBITUSSIN DM) 100-10 MG/5ML SYRUP    Take 5 mLs by mouth 4 times daily as needed for Cough     ED Provider Disposition Time  DISPOSITION Decision To Discharge 11/13/2022 07:23:16 PM      Comment: Please note this report has been produced using speech recognition software and may contain errors related to that system including errors in grammar, punctuation, and spelling, as well as words and phrases that may be inappropriate. Efforts were made to edit the dictations.         Brianna Amado,   11/13/22 [id number]

## 2022-11-14 NOTE — DISCHARGE INSTRUCTIONS
Follow-up with primary care physician for reevaluation of URI symptoms and elevated blood pressure. .  Call for an appointment  Take Robitussin-DM as prescribed for cough and cold symptoms  Take Tessalon Perle as prescribed for cough   Take blood pressure medication as prescribed and directed  Return to the emergency department mediately if increased pain fever chills nausea vomiting dizzy lightheadedness or worsening symptoms.

## 2022-11-14 NOTE — ED NOTES
Report given to Nehemiah Hwakins RN care transferred at this time.      Jason Conrad RN  11/13/22 [id number]

## 2022-11-16 LAB
CULTURE: NORMAL
Lab: NORMAL
SPECIMEN: NORMAL
STREP A DIRECT SCREEN: NEGATIVE

## 2022-11-26 ENCOUNTER — APPOINTMENT (OUTPATIENT)
Dept: GENERAL RADIOLOGY | Age: 47
End: 2022-11-26
Payer: MEDICARE

## 2022-11-26 ENCOUNTER — HOSPITAL ENCOUNTER (OUTPATIENT)
Age: 47
Setting detail: OBSERVATION
Discharge: HOME | End: 2022-11-29
Attending: STUDENT IN AN ORGANIZED HEALTH CARE EDUCATION/TRAINING PROGRAM
Payer: MEDICARE

## 2022-11-26 DIAGNOSIS — Z78.9 PROBLEM WITH VASCULAR ACCESS: Primary | ICD-10-CM

## 2022-11-26 DIAGNOSIS — E83.51 HYPOCALCEMIA: ICD-10-CM

## 2022-11-26 LAB
ANION GAP SERPL CALCULATED.3IONS-SCNC: 15 MMOL/L (ref 4–16)
BASOPHILS ABSOLUTE: 0.1 K/CU MM
BASOPHILS RELATIVE PERCENT: 0.6 % (ref 0–1)
BUN BLDV-MCNC: 65 MG/DL (ref 6–23)
CALCIUM IONIZED: 3.44 MG/DL (ref 4.48–5.28)
CALCIUM SERPL-MCNC: 6.9 MG/DL (ref 8.3–10.6)
CHLORIDE BLD-SCNC: 93 MMOL/L (ref 99–110)
CO2: 22 MMOL/L (ref 21–32)
CREAT SERPL-MCNC: 7.4 MG/DL (ref 0.9–1.3)
DIFFERENTIAL TYPE: ABNORMAL
EOSINOPHILS ABSOLUTE: 0.2 K/CU MM
EOSINOPHILS RELATIVE PERCENT: 1.8 % (ref 0–3)
GFR SERPL CREATININE-BSD FRML MDRD: 8 ML/MIN/1.73M2
GLUCOSE BLD-MCNC: 103 MG/DL (ref 70–99)
GLUCOSE BLD-MCNC: 178 MG/DL (ref 70–99)
GLUCOSE BLD-MCNC: 210 MG/DL (ref 70–99)
GLUCOSE BLD-MCNC: 217 MG/DL (ref 70–99)
GLUCOSE BLD-MCNC: 237 MG/DL (ref 70–99)
HCT VFR BLD CALC: 31.2 % (ref 42–52)
HEMOGLOBIN: 9.9 GM/DL (ref 13.5–18)
IMMATURE NEUTROPHIL %: 0.7 % (ref 0–0.43)
IONIZED CA: 0.86 MMOL/L (ref 1.12–1.32)
LYMPHOCYTES ABSOLUTE: 1.5 K/CU MM
LYMPHOCYTES RELATIVE PERCENT: 17.1 % (ref 24–44)
MCH RBC QN AUTO: 29.2 PG (ref 27–31)
MCHC RBC AUTO-ENTMCNC: 31.7 % (ref 32–36)
MCV RBC AUTO: 92 FL (ref 78–100)
MONOCYTES ABSOLUTE: 1 K/CU MM
MONOCYTES RELATIVE PERCENT: 10.9 % (ref 0–4)
NUCLEATED RBC %: 0 %
PDW BLD-RTO: 15.2 % (ref 11.7–14.9)
PLATELET # BLD: 213 K/CU MM (ref 140–440)
PMV BLD AUTO: 9.8 FL (ref 7.5–11.1)
POTASSIUM SERPL-SCNC: 4.4 MMOL/L (ref 3.5–5.1)
RBC # BLD: 3.39 M/CU MM (ref 4.6–6.2)
SEGMENTED NEUTROPHILS ABSOLUTE COUNT: 6 K/CU MM
SEGMENTED NEUTROPHILS RELATIVE PERCENT: 68.9 % (ref 36–66)
SODIUM BLD-SCNC: 130 MMOL/L (ref 135–145)
TOTAL IMMATURE NEUTOROPHIL: 0.06 K/CU MM
TOTAL NUCLEATED RBC: 0 K/CU MM
WBC # BLD: 8.8 K/CU MM (ref 4–10.5)

## 2022-11-26 PROCEDURE — G0378 HOSPITAL OBSERVATION PER HR: HCPCS

## 2022-11-26 PROCEDURE — 99285 EMERGENCY DEPT VISIT HI MDM: CPT

## 2022-11-26 PROCEDURE — 80048 BASIC METABOLIC PNL TOTAL CA: CPT

## 2022-11-26 PROCEDURE — 6360000002 HC RX W HCPCS: Performed by: STUDENT IN AN ORGANIZED HEALTH CARE EDUCATION/TRAINING PROGRAM

## 2022-11-26 PROCEDURE — 71045 X-RAY EXAM CHEST 1 VIEW: CPT

## 2022-11-26 PROCEDURE — 94761 N-INVAS EAR/PLS OXIMETRY MLT: CPT

## 2022-11-26 PROCEDURE — 6370000000 HC RX 637 (ALT 250 FOR IP): Performed by: STUDENT IN AN ORGANIZED HEALTH CARE EDUCATION/TRAINING PROGRAM

## 2022-11-26 PROCEDURE — 6370000000 HC RX 637 (ALT 250 FOR IP): Performed by: INTERNAL MEDICINE

## 2022-11-26 PROCEDURE — 85025 COMPLETE CBC W/AUTO DIFF WBC: CPT

## 2022-11-26 PROCEDURE — 2580000003 HC RX 258: Performed by: STUDENT IN AN ORGANIZED HEALTH CARE EDUCATION/TRAINING PROGRAM

## 2022-11-26 PROCEDURE — 96372 THER/PROPH/DIAG INJ SC/IM: CPT

## 2022-11-26 PROCEDURE — 6370000000 HC RX 637 (ALT 250 FOR IP): Performed by: EMERGENCY MEDICINE

## 2022-11-26 PROCEDURE — 82330 ASSAY OF CALCIUM: CPT

## 2022-11-26 PROCEDURE — 82962 GLUCOSE BLOOD TEST: CPT

## 2022-11-26 PROCEDURE — 36415 COLL VENOUS BLD VENIPUNCTURE: CPT

## 2022-11-26 PROCEDURE — 6370000000 HC RX 637 (ALT 250 FOR IP): Performed by: PHYSICIAN ASSISTANT

## 2022-11-26 PROCEDURE — 99221 1ST HOSP IP/OBS SF/LOW 40: CPT | Performed by: INTERNAL MEDICINE

## 2022-11-26 RX ORDER — INSULIN LISPRO 100 [IU]/ML
0-4 INJECTION, SOLUTION INTRAVENOUS; SUBCUTANEOUS NIGHTLY
Status: DISCONTINUED | OUTPATIENT
Start: 2022-11-26 | End: 2022-11-29 | Stop reason: HOSPADM

## 2022-11-26 RX ORDER — CLOPIDOGREL BISULFATE 75 MG/1
75 TABLET ORAL DAILY
Status: DISCONTINUED | OUTPATIENT
Start: 2022-11-26 | End: 2022-11-29 | Stop reason: HOSPADM

## 2022-11-26 RX ORDER — OXYCODONE HYDROCHLORIDE AND ACETAMINOPHEN 5; 325 MG/1; MG/1
1 TABLET ORAL EVERY 8 HOURS PRN
Status: DISCONTINUED | OUTPATIENT
Start: 2022-11-26 | End: 2022-11-29 | Stop reason: HOSPADM

## 2022-11-26 RX ORDER — PANTOPRAZOLE SODIUM 40 MG/1
40 TABLET, DELAYED RELEASE ORAL
Status: DISCONTINUED | OUTPATIENT
Start: 2022-11-26 | End: 2022-11-29 | Stop reason: HOSPADM

## 2022-11-26 RX ORDER — LABETALOL HYDROCHLORIDE 5 MG/ML
10 INJECTION, SOLUTION INTRAVENOUS EVERY 4 HOURS PRN
Status: DISCONTINUED | OUTPATIENT
Start: 2022-11-26 | End: 2022-11-29 | Stop reason: HOSPADM

## 2022-11-26 RX ORDER — SODIUM CHLORIDE 0.9 % (FLUSH) 0.9 %
5-40 SYRINGE (ML) INJECTION PRN
Status: DISCONTINUED | OUTPATIENT
Start: 2022-11-26 | End: 2022-11-29 | Stop reason: HOSPADM

## 2022-11-26 RX ORDER — POLYETHYLENE GLYCOL 3350 17 G/17G
17 POWDER, FOR SOLUTION ORAL DAILY PRN
Status: DISCONTINUED | OUTPATIENT
Start: 2022-11-26 | End: 2022-11-29 | Stop reason: HOSPADM

## 2022-11-26 RX ORDER — ONDANSETRON 4 MG/1
4 TABLET, ORALLY DISINTEGRATING ORAL EVERY 8 HOURS PRN
Status: DISCONTINUED | OUTPATIENT
Start: 2022-11-26 | End: 2022-11-29 | Stop reason: HOSPADM

## 2022-11-26 RX ORDER — ASPIRIN 81 MG/1
81 TABLET ORAL DAILY
Status: DISCONTINUED | OUTPATIENT
Start: 2022-11-26 | End: 2022-11-29 | Stop reason: HOSPADM

## 2022-11-26 RX ORDER — CALCITRIOL 0.25 UG/1
0.25 CAPSULE, LIQUID FILLED ORAL 2 TIMES DAILY
Status: DISCONTINUED | OUTPATIENT
Start: 2022-11-26 | End: 2022-11-28

## 2022-11-26 RX ORDER — RANOLAZINE 500 MG/1
500 TABLET, EXTENDED RELEASE ORAL 2 TIMES DAILY
Status: DISCONTINUED | OUTPATIENT
Start: 2022-11-26 | End: 2022-11-29 | Stop reason: HOSPADM

## 2022-11-26 RX ORDER — ARIPIPRAZOLE 5 MG/1
5 TABLET ORAL NIGHTLY
Status: DISCONTINUED | OUTPATIENT
Start: 2022-11-26 | End: 2022-11-29 | Stop reason: HOSPADM

## 2022-11-26 RX ORDER — HEPARIN SODIUM 5000 [USP'U]/ML
5000 INJECTION, SOLUTION INTRAVENOUS; SUBCUTANEOUS EVERY 8 HOURS SCHEDULED
Status: DISCONTINUED | OUTPATIENT
Start: 2022-11-26 | End: 2022-11-29 | Stop reason: HOSPADM

## 2022-11-26 RX ORDER — ACETAMINOPHEN 325 MG/1
650 TABLET ORAL EVERY 6 HOURS PRN
Status: DISCONTINUED | OUTPATIENT
Start: 2022-11-26 | End: 2022-11-29 | Stop reason: HOSPADM

## 2022-11-26 RX ORDER — SODIUM CHLORIDE 9 MG/ML
INJECTION, SOLUTION INTRAVENOUS PRN
Status: DISCONTINUED | OUTPATIENT
Start: 2022-11-26 | End: 2022-11-29 | Stop reason: HOSPADM

## 2022-11-26 RX ORDER — CALCIUM CARBONATE 200(500)MG
1000 TABLET,CHEWABLE ORAL ONCE
Status: COMPLETED | OUTPATIENT
Start: 2022-11-26 | End: 2022-11-26

## 2022-11-26 RX ORDER — ALOGLIPTIN 6.25 MG/1
6.25 TABLET, FILM COATED ORAL DAILY
Status: DISCONTINUED | OUTPATIENT
Start: 2022-11-26 | End: 2022-11-29 | Stop reason: HOSPADM

## 2022-11-26 RX ORDER — CALCIUM CARBONATE 200(500)MG
1000 TABLET,CHEWABLE ORAL 4 TIMES DAILY
Status: COMPLETED | OUTPATIENT
Start: 2022-11-26 | End: 2022-11-27

## 2022-11-26 RX ORDER — SODIUM CHLORIDE 0.9 % (FLUSH) 0.9 %
5-40 SYRINGE (ML) INJECTION EVERY 12 HOURS SCHEDULED
Status: DISCONTINUED | OUTPATIENT
Start: 2022-11-26 | End: 2022-11-29 | Stop reason: HOSPADM

## 2022-11-26 RX ORDER — CALCITRIOL 0.25 UG/1
0.25 CAPSULE, LIQUID FILLED ORAL DAILY
Status: DISCONTINUED | OUTPATIENT
Start: 2022-11-26 | End: 2022-11-26

## 2022-11-26 RX ORDER — DEXTROSE MONOHYDRATE 100 MG/ML
INJECTION, SOLUTION INTRAVENOUS CONTINUOUS PRN
Status: DISCONTINUED | OUTPATIENT
Start: 2022-11-26 | End: 2022-11-29 | Stop reason: HOSPADM

## 2022-11-26 RX ORDER — TRAZODONE HYDROCHLORIDE 50 MG/1
50 TABLET ORAL NIGHTLY PRN
Status: DISCONTINUED | OUTPATIENT
Start: 2022-11-26 | End: 2022-11-29 | Stop reason: HOSPADM

## 2022-11-26 RX ORDER — INSULIN LISPRO 100 [IU]/ML
20 INJECTION, SOLUTION INTRAVENOUS; SUBCUTANEOUS
Status: DISCONTINUED | OUTPATIENT
Start: 2022-11-26 | End: 2022-11-29 | Stop reason: HOSPADM

## 2022-11-26 RX ORDER — CARVEDILOL 6.25 MG/1
12.5 TABLET ORAL 2 TIMES DAILY WITH MEALS
Status: DISCONTINUED | OUTPATIENT
Start: 2022-11-26 | End: 2022-11-29 | Stop reason: HOSPADM

## 2022-11-26 RX ORDER — ATORVASTATIN CALCIUM 40 MG/1
40 TABLET, FILM COATED ORAL NIGHTLY
Status: DISCONTINUED | OUTPATIENT
Start: 2022-11-26 | End: 2022-11-29 | Stop reason: HOSPADM

## 2022-11-26 RX ORDER — ACETAMINOPHEN 650 MG/1
650 SUPPOSITORY RECTAL EVERY 6 HOURS PRN
Status: DISCONTINUED | OUTPATIENT
Start: 2022-11-26 | End: 2022-11-29 | Stop reason: HOSPADM

## 2022-11-26 RX ORDER — ACETAMINOPHEN 325 MG/1
650 TABLET ORAL ONCE
Status: COMPLETED | OUTPATIENT
Start: 2022-11-26 | End: 2022-11-26

## 2022-11-26 RX ORDER — SEVELAMER CARBONATE 800 MG/1
800 TABLET, FILM COATED ORAL
Status: DISCONTINUED | OUTPATIENT
Start: 2022-11-26 | End: 2022-11-29 | Stop reason: HOSPADM

## 2022-11-26 RX ORDER — AMLODIPINE BESYLATE 10 MG/1
10 TABLET ORAL DAILY
Status: DISCONTINUED | OUTPATIENT
Start: 2022-11-26 | End: 2022-11-29 | Stop reason: HOSPADM

## 2022-11-26 RX ORDER — LABETALOL HYDROCHLORIDE 5 MG/ML
10 INJECTION, SOLUTION INTRAVENOUS ONCE
Status: DISCONTINUED | OUTPATIENT
Start: 2022-11-26 | End: 2022-11-27

## 2022-11-26 RX ORDER — ONDANSETRON 2 MG/ML
4 INJECTION INTRAMUSCULAR; INTRAVENOUS EVERY 6 HOURS PRN
Status: DISCONTINUED | OUTPATIENT
Start: 2022-11-26 | End: 2022-11-29 | Stop reason: HOSPADM

## 2022-11-26 RX ORDER — INSULIN GLARGINE 100 [IU]/ML
45 INJECTION, SOLUTION SUBCUTANEOUS NIGHTLY
Status: DISCONTINUED | OUTPATIENT
Start: 2022-11-26 | End: 2022-11-29 | Stop reason: HOSPADM

## 2022-11-26 RX ORDER — INSULIN LISPRO 100 [IU]/ML
0-8 INJECTION, SOLUTION INTRAVENOUS; SUBCUTANEOUS
Status: DISCONTINUED | OUTPATIENT
Start: 2022-11-26 | End: 2022-11-29 | Stop reason: HOSPADM

## 2022-11-26 RX ADMIN — CARVEDILOL 12.5 MG: 6.25 TABLET, FILM COATED ORAL at 08:52

## 2022-11-26 RX ADMIN — INSULIN LISPRO 2 UNITS: 100 INJECTION, SOLUTION INTRAVENOUS; SUBCUTANEOUS at 12:29

## 2022-11-26 RX ADMIN — CALCIUM CARBONATE 1000 MG: 500 TABLET, CHEWABLE ORAL at 04:31

## 2022-11-26 RX ADMIN — RANOLAZINE 500 MG: 500 TABLET, FILM COATED, EXTENDED RELEASE ORAL at 21:47

## 2022-11-26 RX ADMIN — CARVEDILOL 12.5 MG: 6.25 TABLET, FILM COATED ORAL at 16:39

## 2022-11-26 RX ADMIN — CALCIUM CARBONATE 1000 MG: 500 TABLET, CHEWABLE ORAL at 12:28

## 2022-11-26 RX ADMIN — CALCIUM CARBONATE 1000 MG: 500 TABLET, CHEWABLE ORAL at 16:39

## 2022-11-26 RX ADMIN — CALCITRIOL CAPSULES 0.25 MCG 0.25 MCG: 0.25 CAPSULE ORAL at 08:52

## 2022-11-26 RX ADMIN — CLOPIDOGREL BISULFATE 75 MG: 75 TABLET ORAL at 08:52

## 2022-11-26 RX ADMIN — OXYCODONE AND ACETAMINOPHEN 1 TABLET: 5; 325 TABLET ORAL at 21:46

## 2022-11-26 RX ADMIN — HEPARIN SODIUM 5000 UNITS: 5000 INJECTION INTRAVENOUS; SUBCUTANEOUS at 21:48

## 2022-11-26 RX ADMIN — SEVELAMER CARBONATE 800 MG: 800 TABLET, FILM COATED ORAL at 12:28

## 2022-11-26 RX ADMIN — ACETAMINOPHEN 650 MG: 325 TABLET ORAL at 05:05

## 2022-11-26 RX ADMIN — INSULIN LISPRO 2 UNITS: 100 INJECTION, SOLUTION INTRAVENOUS; SUBCUTANEOUS at 09:00

## 2022-11-26 RX ADMIN — OXYCODONE AND ACETAMINOPHEN 1 TABLET: 5; 325 TABLET ORAL at 06:42

## 2022-11-26 RX ADMIN — INSULIN LISPRO 20 UNITS: 100 INJECTION, SOLUTION INTRAVENOUS; SUBCUTANEOUS at 12:29

## 2022-11-26 RX ADMIN — SODIUM CHLORIDE, PRESERVATIVE FREE 10 ML: 5 INJECTION INTRAVENOUS at 22:21

## 2022-11-26 RX ADMIN — SEVELAMER CARBONATE 800 MG: 800 TABLET, FILM COATED ORAL at 16:39

## 2022-11-26 RX ADMIN — AMLODIPINE BESYLATE 10 MG: 10 TABLET ORAL at 08:52

## 2022-11-26 RX ADMIN — HEPARIN SODIUM 5000 UNITS: 5000 INJECTION INTRAVENOUS; SUBCUTANEOUS at 06:43

## 2022-11-26 RX ADMIN — ATORVASTATIN CALCIUM 40 MG: 40 TABLET, FILM COATED ORAL at 21:47

## 2022-11-26 RX ADMIN — INSULIN GLARGINE 45 UNITS: 100 INJECTION, SOLUTION SUBCUTANEOUS at 21:45

## 2022-11-26 RX ADMIN — PANTOPRAZOLE SODIUM 40 MG: 40 TABLET, DELAYED RELEASE ORAL at 16:39

## 2022-11-26 RX ADMIN — ARIPIPRAZOLE 5 MG: 5 TABLET ORAL at 21:47

## 2022-11-26 RX ADMIN — SODIUM CHLORIDE, PRESERVATIVE FREE 10 ML: 5 INJECTION INTRAVENOUS at 09:03

## 2022-11-26 RX ADMIN — SEVELAMER CARBONATE 800 MG: 800 TABLET, FILM COATED ORAL at 08:52

## 2022-11-26 RX ADMIN — HEPARIN SODIUM 5000 UNITS: 5000 INJECTION INTRAVENOUS; SUBCUTANEOUS at 14:11

## 2022-11-26 RX ADMIN — CALCIUM CARBONATE 1000 MG: 500 TABLET, CHEWABLE ORAL at 21:47

## 2022-11-26 RX ADMIN — RANOLAZINE 500 MG: 500 TABLET, FILM COATED, EXTENDED RELEASE ORAL at 08:52

## 2022-11-26 RX ADMIN — CALCIUM CARBONATE 1000 MG: 500 TABLET, CHEWABLE ORAL at 08:58

## 2022-11-26 RX ADMIN — ASPIRIN 81 MG: 81 TABLET, COATED ORAL at 08:52

## 2022-11-26 RX ADMIN — TRAZODONE HYDROCHLORIDE 50 MG: 50 TABLET ORAL at 21:47

## 2022-11-26 RX ADMIN — ALOGLIPTIN 6.25 MG: 6.25 TABLET, FILM COATED ORAL at 08:58

## 2022-11-26 RX ADMIN — CALCITRIOL CAPSULES 0.25 MCG 0.25 MCG: 0.25 CAPSULE ORAL at 21:47

## 2022-11-26 RX ADMIN — INSULIN LISPRO 20 UNITS: 100 INJECTION, SOLUTION INTRAVENOUS; SUBCUTANEOUS at 09:00

## 2022-11-26 RX ADMIN — ACETAMINOPHEN 650 MG: 325 TABLET ORAL at 21:47

## 2022-11-26 RX ADMIN — PANTOPRAZOLE SODIUM 40 MG: 40 TABLET, DELAYED RELEASE ORAL at 06:42

## 2022-11-26 RX ADMIN — OXYCODONE AND ACETAMINOPHEN 1 TABLET: 5; 325 TABLET ORAL at 14:11

## 2022-11-26 ASSESSMENT — ENCOUNTER SYMPTOMS
BACK PAIN: 0
CHEST TIGHTNESS: 0
COLOR CHANGE: 0
VOICE CHANGE: 0
SINUS PRESSURE: 0
SHORTNESS OF BREATH: 0
TROUBLE SWALLOWING: 0
WHEEZING: 0
SINUS PAIN: 0
SORE THROAT: 0
ABDOMINAL PAIN: 0
NAUSEA: 0
DIARRHEA: 0
VOMITING: 0
COUGH: 0
CONSTIPATION: 0

## 2022-11-26 ASSESSMENT — PAIN DESCRIPTION - LOCATION
LOCATION: FOOT
LOCATION: FOOT

## 2022-11-26 ASSESSMENT — PAIN SCALES - GENERAL
PAINLEVEL_OUTOF10: 5
PAINLEVEL_OUTOF10: 1
PAINLEVEL_OUTOF10: 7
PAINLEVEL_OUTOF10: 7
PAINLEVEL_OUTOF10: 6

## 2022-11-26 ASSESSMENT — PAIN - FUNCTIONAL ASSESSMENT
PAIN_FUNCTIONAL_ASSESSMENT: 0-10
PAIN_FUNCTIONAL_ASSESSMENT: PREVENTS OR INTERFERES SOME ACTIVE ACTIVITIES AND ADLS

## 2022-11-26 ASSESSMENT — PAIN SCALES - WONG BAKER
WONGBAKER_NUMERICALRESPONSE: 0
WONGBAKER_NUMERICALRESPONSE: 0

## 2022-11-26 ASSESSMENT — PAIN DESCRIPTION - ORIENTATION
ORIENTATION: LEFT
ORIENTATION: LEFT

## 2022-11-26 ASSESSMENT — PAIN DESCRIPTION - DESCRIPTORS: DESCRIPTORS: SHARP;GNAWING

## 2022-11-26 NOTE — CONSULTS
CARDIOLOGY CONSULT NOTE   Reason for consultation: ANTI Platelet management    Referring physician:  Arlen Goddard MD     Primary care physician: Skyler Green MD      Dear  Dr. Arlen Goddard MD   Thanks for the consult. Chief Complaints :  Chief Complaint   Patient presents with    Vascular Access Problem     Catheter came out, R upper chest        History of present illness:Yovanny is a 52 y. o.year old who presents with concerns for hemodialysis catheter being pulled out by accident. He supposed to have a tunneled catheter placed cardiology was asked to advise for antiplatelet management  Is well-known to cardiology service due to multiple previous admissions  He had PCI to circumflex artery in September 2022 also has history of previous LAD stent and RCA stents  He has uncontrolled diabetes and end-stage renal disease      Past medical history:    has a past medical history of Abscess, Acute renal failure (ARF) ([address] Anxiety associated with depression, Anxiety associated with depression, Back pain, Chest pain, Diabetic nephropathy ([address] Diabetic neuropathy ([address] Diabetic ulcer of left midfoot associated with type 2 diabetes mellitus, with fat layer exposed ([address] Diverticulosis, DM (diabetes mellitus), type 2 ([address] 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 Past surgical history:   has a past surgical history that includes Cardiac catheterization (2003, 2013);  Tonsillectomy and adenoidectomy (1988); Upper gastrointestinal endoscopy (06/02/2011); Colonoscopy (06/02/2011); Nose surgery (1993); skin biopsy (N/A, 06/18/2013); other surgical history (Right, 05/22/2015); Toe amputation; Abdomen surgery; other surgical history (12/04/2017); pr deep incis foot bone infectn (Left, 09/22/2018); Upper gastrointestinal endoscopy (N/A, 01/12/2019); Toe amputation (Right, 03/23/2019); Toe amputation (Right, 08/06/2019); Scrotal surgery (N/A, 05/15/2021); Scrotal surgery (N/A, 05/16/2021); Foot surgery (Left, 12/21/2021); Upper gastrointestinal endoscopy (N/A, 07/26/2022); IR TUNNELED CVC PLACE WO SQ PORT/PUMP > 5 YEARS (07/27/2022); IR NONTUNNELED VASCULAR CATHETER > 5 YEARS (07/25/2022); and Coronary stent placement. Social History:   reports that he quit smoking about 4 months ago. His smoking use included cigarettes. He has never used smokeless tobacco. He reports that he does not currently use alcohol. He reports current drug use. Frequency: 7.00 times per week. Drug: Marijuana Deadra Eduard).   Family history:   no family history of CAD, STROKE of DM at early age    Allergies   Allergen Reactions    Adhesive Tape Rash    Doxycycline Nausea And Vomiting    Reglan [Metoclopramide] Anxiety       alogliptin (NESINA) tablet 6.25 mg, Daily  amLODIPine (NORVASC) tablet 10 mg, Daily  ARIPiprazole (ABILIFY) tablet 5 mg, Nightly  [Held by provider] aspirin EC tablet 81 mg, Daily  atorvastatin (LIPITOR) tablet 40 mg, Nightly  carvedilol (COREG) tablet 12.5 mg, BID WC  [Held by provider] clopidogrel (PLAVIX) tablet 75 mg, Daily  insulin glargine (LANTUS) injection vial 45 Units, Nightly  insulin lispro (HUMALOG) injection vial 20 Units, TID WC  insulin lispro (HUMALOG) injection vial 0-8 Units, TID WC  insulin lispro (HUMALOG) injection vial 0-4 Units, Nightly  glucose chewable tablet 16 g, PRN  dextrose bolus 10% 125 mL, PRN   Or  dextrose bolus 10% 250 mL, PRN  glucagon (rDNA) injection 1 mg, PRN  dextrose 10 % infusion, Continuous PRN  pantoprazole (PROTONIX) tablet 40 mg, BID AC  ranolazine (RANEXA) extended release tablet 500 mg, BID  sevelamer (RENVELA) tablet 800 mg, TID WC  traZODone (DESYREL) tablet 50 mg, Nightly PRN  sodium chloride flush 0.9 % injection 5-40 mL, 2 times per day  sodium chloride flush 0.9 % injection 5-40 mL, PRN  0.9 % sodium chloride infusion, PRN  ondansetron (ZOFRAN-ODT) disintegrating tablet 4 mg, Q8H PRN   Or  ondansetron (ZOFRAN) injection 4 mg, Q6H PRN  polyethylene glycol (GLYCOLAX) packet 17 g, Daily PRN  acetaminophen (TYLENOL) tablet 650 mg, Q6H PRN   Or  acetaminophen (TYLENOL) suppository 650 mg, Q6H PRN  heparin (porcine) injection 5,000 Units, 3 times per day  oxyCODONE-acetaminophen (PERCOCET) 5-325 MG per tablet 1 tablet, Q8H PRN  labetalol (NORMODYNE;TRANDATE) injection 10 mg, Q4H PRN  labetalol (NORMODYNE;TRANDATE) injection 10 mg, Once  calcium carbonate (TUMS) chewable tablet 1,000 mg, 4x daily  calcitRIOL (ROCALTROL) capsule 0.25 mcg, BID      Current Facility-Administered Medications   Medication Dose Route Frequency Provider Last Rate Last Admin    alogliptin (NESINA) tablet 6.25 mg  6.25 mg Oral Daily Joselyn Mcarthur MD   6.25 mg at 11/26/22 0858    amLODIPine (NORVASC) tablet 10 mg  10 mg Oral Daily Joselyn Mcarthur MD   10 mg at 11/26/22 0852    ARIPiprazole (ABILIFY) tablet 5 mg  5 mg Oral Nightly Deon Carrasco MD        [facility] [place] by provider] aspirin EC tablet 81 mg  81 mg Oral Daily Joselyn Mcarthur MD   81 mg at 11/26/22 0852    atorvastatin (LIPITOR) tablet 40 mg  40 mg Oral Nightly Joselyn Mcarthur MD        carvedilol (COREG) tablet 12.5 mg  12.5 mg Oral BID WC Joselyn Mcarthur MD   12.5 mg at 11/26/22 0852    [Held by provider] clopidogrel (PLAVIX) tablet 75 mg  75 mg Oral Daily Joselyn Mcarthur MD   75 mg at 11/26/22 0852    insulin glargine (LANTUS) injection vial 45 Units  45 Units SubCUTAneous Nightly Noa LANDIN MD Lyubov        insulin lispro (HUMALOG) injection vial 20 Units  20 Units SubCUTAneous TID  Gertrudis Vasquez MD   20 Units at 11/26/22 1229    insulin lispro (HUMALOG) injection vial 0-8 Units  0-8 Units SubCUTAneous TID  Gertrudis Vasquez MD   2 Units at 11/26/22 1229    insulin lispro (HUMALOG) injection vial 0-4 Units  0-4 Units SubCUTAneous Nightly Jaxon Mcarthur MD        glucose chewable tablet 16 g  4 tablet Oral PRN Gertrudis Vasquez MD        dextrose bolus 10% 125 mL  125 mL IntraVENous PRN Gertrudis Vasquez MD        Or    dextrose bolus 10% 250 mL  250 mL IntraVENous PRN Gertrudis Vasquez MD        glucagon (rDNA) injection 1 mg  1 mg SubCUTAneous PRN Gertrudis Vasquez MD        dextrose 10 % infusion   IntraVENous Continuous PRN Gertrudis Vasquez MD        pantoprazole (PROTONIX) tablet 40 mg  40 mg Oral BID  Jaxon Mcarthur MD   40 mg at 11/26/22 3270    ranolazine (RANEXA) extended release tablet 500 mg  500 mg Oral BID Jaxon Mcarthur MD   500 mg at 11/26/22 5946    sevelamer (RENVELA) tablet 800 mg  800 mg Oral TID  Jaxon Mcarthur MD   800 mg at 11/26/22 1228    traZODone (DESYREL) tablet 50 mg  50 mg Oral Nightly PRN Jaxon Mcarthur MD        sodium chloride flush 0.9 % injection 5-40 mL  5-40 mL IntraVENous 2 times per day Gertrudis Vasquez MD   10 mL at 11/26/22 0903    sodium chloride flush 0.9 % injection 5-40 mL  5-40 mL IntraVENous PRN Gertrudis Vasquez MD        0.9 % sodium chloride infusion   IntraVENous PRN Gertrudis Vasquez MD        ondansetron (ZOFRAN-ODT) disintegrating tablet 4 mg  4 mg Oral Q8H PRN Gertrudis Vasquez MD        Or    ondansetron [facility]) injection 4 mg  4 mg IntraVENous Q6H PRN Gertrudis Vasquez MD        polyethylene glycol (GLYCOLAX) packet 17 g  17 g Oral Daily PRN Gertrudis Vasquez MD        acetaminophen (TYLENOL) tablet 650 mg 650 mg Oral Q6H PRN Junior Chris MD        Or    acetaminophen (TYLENOL) suppository 650 mg  650 mg Rectal Q6H PRN Junior Chris MD        heparin (porcine) injection 5,000 Units  5,000 Units SubCUTAneous 3 times per day Junior Chris MD   5,000 Units at 11/26/22 1411    oxyCODONE-acetaminophen (PERCOCET) 5-325 MG per tablet 1 tablet  1 tablet Oral Q8H PRN Junior Chris MD   1 tablet at 11/26/22 1411    labetalol (NORMODYNE;TRANDATE) injection 10 mg  10 mg IntraVENous Q4H PRN Junior Chris MD        labetalol (NORMODYNE;TRANDATE) injection 10 mg  10 mg IntraVENous Once Junior Chris MD        calcium carbonate (TUMS) chewable tablet 1,000 mg  1,000 mg Oral 4x daily Adeluis Cortez [place], DO   1,000 mg at 11/26/22 1228    calcitRIOL (ROCALTROL) capsule 0.25 mcg  0.25 mcg Oral BID Adeleye Dana [place], DO   0.25 mcg at 11/26/22 2270     Review of Systems:   Constitutional: No Fever or Weight Loss   Eyes: No Decreased Vision  ENT: No Headaches, Hearing Loss or Vertigo  Cardiovascular: As per HPI  Respiratory: As per HPI  Gastrointestinal: No abdominal pain, appetite loss, blood in stools, constipation, diarrhea or heartburn  Genitourinary: No dysuria, trouble voiding, or hematuria  Musculoskeletal:  No gait disturbance, weakness or joint complaints  Integumentary: No rash or pruritis  Neurological: No TIA or stroke symptoms  Psychiatric: No anxiety or depression  Endocrine: No malaise, fatigue or temperature intolerance  Hematologic/Lymphatic: No bleeding problems, blood clots or swollen lymph nodes  Allergic/Immunologic: No nasal congestion or hives  All systems negative except as marked.         Physical Examination:    Vitals:    11/26/22 0801 11/26/22 1411 11/26/22 1441 11/26/22 1447   BP: (!) 159/89  Comment: Redid with a bigger cuff   107/66   Pulse:    82   Resp:  18 16 17   Temp:    98.7 °F (37.1 °C)   TempSrc:    Oral   SpO2:    97%   Weight: Height:           General Appearance:  No distress, conversant    Constitutional:  Well developed, Well nourished, No acute distress, Non-toxic appearance. HENT:  Normocephalic, Atraumatic, Bilateral external ears normal, Oropharynx moist, No oral exudates, Nose normal. Neck- Normal range of motion, No tenderness, Supple, No stridor,no apical-carotid delay  Lymphatics : no palpable lymph nodes  Eyes:  PERRL, EOMI, Conjunctiva normal, No discharge. Respiratory:  Normal breath sounds, No respiratory distress, No wheezing, No chest tenderness. ,no use of accessory muscles, crackles Absent   Cardiovascular: (PMI) apex non displaced,no lifts no thrills, ankle swelling Absent  , 1+, s1 and s2 audible,Murmur. Absent , JVD not noted    Abdomen /GI:  Bowel sounds normal, Soft, No tenderness, No masses, No gross visceromegaly   :  No costovertebral angle tenderness   Musculoskeletal:  No edema, no tenderness, no deformities. Back- no tenderness  Integument:  Well hydrated, no rash   Lymphatic:  No lymphadenopathy noted   Neurologic:  Alert & oriented x 3, CN 2-12 normal, normal motor function, normal sensory function, no focal deficits noted           Medical decision making and Data review:    Lab Review   Recent Labs     11/26/22 0225   WBC 8.8   HGB 9.9*   HCT 31.2*         Recent Labs     11/26/22 0225   *   K 4.4   CL 93*   CO2 22   BUN 65*   CREATININE 7.4*     No results for input(s): AST, ALT, ALB, BILIDIR, BILITOT, ALKPHOS in the last 72 hours. No results for input(s): TROPONINT in the last 72 hours. No results for input(s): PROBNP in the last 72 hours.   Lab Results   Component Value Date    INR 1.08 08/23/2022    PROTIME 13.9 08/23/2022       EKG: (reviewed by myself)    ECHO:(reviewed by myself)    Chest Xray:(reviewed by myself)  XR CHEST PORTABLE    Result Date: 11/26/2022  EXAMINATION: ONE X-RAY VIEW OF THE CHEST 11/26/2022 2:25 am COMPARISON: November 13, 2022 HISTORY: [address] PROVIDED HISTORY:  Dialysis catheter removed TECHNOLOGIST PROVIDED HISTORY: Reason for Exam:  Dialysis catheter removed FINDINGS: No lines or tubes. Normal cardiomediastinal silhouette. The lungs are clear without focal consolidation or pleural effusion. No suspicious pulmonary nodules. No pulmonary edema. No pneumothorax. No acute osseous abnormality. Interval removal of the right internal jugular dialysis catheter. No acute cardiopulmonary disease. XR CHEST PORTABLE    Result Date: 11/13/2022  EXAMINATION: ONE XRAY VIEW OF THE CHEST 11/13/2022 6:20 pm COMPARISON: 09/12/2022 HISTORY: ORDERING SYSTEM PROVIDED HISTORY: cough, shortness of breath TECHNOLOGIST PROVIDED HISTORY: Reason for exam:->cough, shortness of breath Reason for Exam: cough, sob FINDINGS: Right-sided temporary dialysis catheter tip is in the lower SVC. Pulmonary vascular congestion without overt edema or consolidation. No pneumothorax or pleural effusion. Cardiac and mediastinal contours stable. No acute osseous abnormality. Pulmonary vascular congestion without overt edema or consolidation. All labs, medications and tests reviewed by myself including data  from outside source , patient and available family . Continue all other medications of all above medical condition listed as is. Impression:  Principal Problem:    Problem with vascular access  Resolved Problems:    * No resolved hospital problems. *      Assessment: 52 y. 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 Plan and Recommendations:    S/p PCI in September 2022 risk versus benefits do not favor holding antiplatelet at this time less absolutely needed have discussed with IR in the past they have agreed to proceed with tunneled catheter placement on dual antiplatelet therapy  ESRD continue dialysis as per nephrology  History of diabetic foot ulcer with normal arterial Doppler continue wound management  Hypertension continue amlodipine and carvedilol 12.5 mg twice daily  Diabetes as per primary team  DVT prophylaxis if no contraindication  6. Dyslipidemia: continue statins           Thank you  much for consult and giving us the opportunity in contributing in the care of this patient. Please feel free to call me for any questions.        Rick Iverson MD, 11/26/2022 3:11 PM

## 2022-11-26 NOTE — H&P
V2.0  History and Physical      Name:  Mckenna Morrison /Age/Sex: 1975  (52 y.o. male)   MRN & CSN:  [id number] & [id number] Encounter Date/Time: 2022 4:57 AM EST   Location:  ED27/ED-27 PCP: Shirlene Olvera, 29 Pat Melgar Day: 1    History from:     patient    History of Present Illness:     Chief Complaint: Problem with vascular access    Mckenna Morrison is a 52 y.o. male with pmh of ESRD on HD, Type II DM, HTN, and anxiety  who presented after his hemodialysis line was accidentally pulled out. Patient states that his friend's dog was sitting on his chest and his line was caught and pulled out. Patient denies any bleeding and has no other acute complaints at this time. In the ED chest x-ray was fine and his labs were pertinent for elevated creatinine up to 7.5. Patient be admitted to be seen by his nephrologist to regain vascular access. Patient's BP elevated in the with systolic in the [id number]. Patient denies chest pain and headache. Assessment and Plan:   Mckenna Morrison is a 52 y.o. male with a pmh of SRD on HD, Type II DM, HTN, and anxiety  who presented after his hemodialysis line was accidentally pulled out    Lost HD Access  Line was pulled out accidentally. Nephrology Consulted  IR consulted for line placement    ESRD on HD  Typically has dialysis  and Friday. Has not missed dialysis. Nephrology consulted    Type 2 diabetes  Insulin-dependent type 2 diabetes  Basal bolus insulin ordered in house  Point-of-care glucose checks  Hypoglycemia protocol    Hypertensive Urgency  Patient with elevated BP to 200's at presentation. 1 time Latetolol given in ED  Continue home antihypertensives  PRN Labetalol. Anxiety  Continue home anxiolytics      Disposition:   Current Living situation: home  Expected Disposition: home  Estimated D/C: 1-2 days.     Diet No diet orders on file   DVT Prophylaxis [] Lovenox, [x]  Heparin, [] SCDs, [] Ambulation,  [] Eliquis, [] Xarelto Code Status Prior   Surrogate Decision Maker/ POA       Review of Systems: Need 10 Elements   Review of Systems   Constitutional:  Negative for activity change, appetite change, chills, fatigue and fever. HENT:  Negative for congestion, hearing loss, sinus pressure, sinus pain, sore throat, trouble swallowing and voice change. Eyes:  Negative for visual disturbance. Respiratory:  Negative for cough, chest tightness, shortness of breath and wheezing. Cardiovascular:  Negative for chest pain, palpitations and leg swelling. Gastrointestinal:  Negative for abdominal pain, constipation, diarrhea, nausea and vomiting. Endocrine: Negative for cold intolerance, heat intolerance and polyuria. Genitourinary:  Negative for dysuria. Musculoskeletal:  Negative for arthralgias, back pain and gait problem. Skin:  Negative for color change. Neurological:  Negative for dizziness, weakness and headaches. Psychiatric/Behavioral:  Negative for agitation and confusion. The patient is not nervous/anxious. Objective:   No intake or output data in the 24 hours ending 11/26/22 0457   Vitals:   Vitals:    11/26/22 0117 11/26/22 0230 11/26/22 0433   BP: (!) 184/91 (!) 207/101 (!) 201/108   Pulse: 100 93 95   Resp: 17  18   Temp: 98.3 °F (36.8 °C)  98 °F (36.7 °C)   TempSrc: Oral  Oral   SpO2: 98% 96% 95%   Weight: 290 lb (131.5 kg)     Height: 5' 9\" (1.753 m)         Medications Prior to Admission     Prior to Admission medications    Medication Sig Start Date End Date Taking?  Authorizing Provider   atorvastatin (LIPITOR) 40 MG tablet Take 1 tablet by mouth nightly 9/14/22   Mack Godwin MD   carvedilol (COREG) 12.5 MG tablet Take 1 tablet by mouth 2 times daily (with meals) 9/14/22   Mack Godwin MD   epoetin paola-epbx (RETACRIT) 33467 UNIT/ML SOLN injection Inject 1 mL into the skin three times a week To be given in HD by nurse 9/16/22   Mack Godwin MD   Multiple Vitamins-Minerals (THERAPEUTIC MULTIVITAMIN-MINERALS) tablet Take 1 tablet by mouth daily 9/14/22   Guillermina Palomino MD   amLODIPine (NORVASC) 10 MG tablet Take 1 tablet by mouth daily 9/14/22   Guillermina Palomino MD   B Complex-C-Folic Acid (ALEX-JEFFERSON) TABS Take 1 tablet by mouth daily 8/9/22   Historical Provider, MD   methocarbamol (ROBAXIN) 500 MG tablet Take 500 mg by mouth nightly 8/31/22   Historical Provider, MD   sevelamer (RENVELA) 800 MG tablet Take 800 mg by mouth 3 times daily (with meals) 8/9/22   Historical Provider, MD   bacitracin 500 UNIT/GM ointment Apply topically 8/18/22   Historical Provider, MD   ranolazine (RANEXA) 500 MG extended release tablet Take 1 tablet by mouth 2 times daily 9/6/22   Pedro Cortez MD   aspirin 81 MG EC tablet Take 1 tablet by mouth daily 8/25/22   Mynor Moreno MD   clopidogrel (PLAVIX) 75 MG tablet Take 1 tablet by mouth daily 8/25/22   Mynor Moreno MD   metoprolol tartrate (LOPRESSOR) 25 MG tablet Take 0.5 tablets by mouth 2 times daily 8/25/22 9/14/22  Mynor Moreno MD   oxyCODONE-acetaminophen (PERCOCET) 5-325 MG per tablet Take 1.5 tablets by mouth daily. Historical Provider, MD   traZODone (DESYREL) 50 MG tablet Take 1 tablet by mouth nightly as needed for Sleep 8/3/22   Suzanne Hutchins MD   alogliptin (NESINA) 6.25 MG TABS tablet Take 1 tablet by mouth in the morning. 8/4/22   Suzanne Hutchins MD   insulin lispro, 1 Unit Dial, (HUMALOG KWIKPEN) 100 UNIT/ML SOPN Inject 20 Units into the skin in the morning and 20 Units at noon and 20 Units in the evening. Inject before meals.   Patient taking differently: Inject 35 Units into the skin 3 times daily (before meals) 8/3/22   Suzanne Hutchins MD   insulin glargine (LANTUS SOLOSTAR) 100 UNIT/ML injection pen Inject 45 Units into the skin nightly  Patient taking differently: Inject 75 Units into the skin nightly 8/3/22   Suzanne Hutchins MD   ARIPiprazole (ABILIFY) 5 MG tablet Take 1 tablet by mouth at bedtime 8/3/22   Suzanne Hutchins MD   pantoprazole (PROTONIX) 40 MG tablet Take 1 tablet by mouth in the morning and 1 tablet in the evening. Take before meals. 8/3/22   Yung Quesada MD   sucralfate (CARAFATE) 1 GM tablet Take 1 tablet by mouth in the morning and 1 tablet in the evening. Take before meals. Patient not taking: No sig reported 8/3/22   Yung Quesada MD   calcitRIOL (ROCALTROL) 0.25 MCG capsule Take 1 capsule by mouth in the morning. 8/4/22   Yung Quesada MD   furosemide (LASIX) 40 MG tablet Take 1 tablet by mouth daily 9/22/21 8/3/22  Tonie Angel MD   insulin aspart (NOVOLOG) 100 UNIT/ML injection vial Inject 35 Units into the skin 3 times daily (before meals)  8/3/22  Deejay Provider, MD   chlorthalidone (HYGROTON) 25 MG tablet Take 1 tablet by mouth daily 6/15/21 8/3/22  Jewel Mayfield MD   linagliptin (TRADJENTA) 5 MG tablet Take 1 tablet by mouth daily 3/29/21 8/3/22  Jewel Mayfield MD   ondansetron (ZOFRAN) 4 MG tablet Take 1 tablet by mouth every 8 hours as needed for Nausea or Vomiting 9/23/18   Micah Jiménez MD   Lancets MISC 1 each by Does not apply route daily 9/21/18   Micah Jiménez MD   glucose monitoring kit (FREESTYLE) monitoring kit 1 each by Does not apply route once for 1 dose. 6/20/13 6/20/13  Leanne Salvador MD       Physical Exam: Need 8 Elements     Physical Exam  Constitutional:       General: He is not in acute distress. Appearance: Normal appearance. HENT:      Head: Normocephalic and atraumatic. Nose: Nose normal.      Mouth/Throat:      Mouth: Mucous membranes are moist.      Pharynx: Oropharynx is clear. Eyes:      Extraocular Movements: Extraocular movements intact. Conjunctiva/sclera: Conjunctivae normal.      Pupils: Pupils are equal, round, and reactive to light. Cardiovascular:      Rate and Rhythm: Normal rate and regular rhythm. Pulses: Normal pulses. Heart sounds: Normal heart sounds.    Pulmonary:      Effort: Pulmonary effort is normal. Abdominal:      General: Abdomen is flat. Bowel sounds are normal.      Palpations: Abdomen is soft. Musculoskeletal:         General: Normal range of motion. Cervical back: Normal range of motion and neck supple. Skin:     General: Skin is warm and dry. Neurological:      General: No focal deficit present. Mental Status: He is alert and oriented to person, place, and time. Mental status is at baseline. Psychiatric:         Mood and Affect: Mood normal.         Behavior: Behavior normal.         Thought Content: Thought content normal.       Past Medical History:   PMHx   Past Medical History:   Diagnosis Date    Abscess 2010    scrotal    Acute renal failure (ARF) ([address] 08/04/2019    Anxiety associated with depression     Anxiety associated with depression     Back pain 07/02/2012    Chest pain 05/01/2013    Diabetic nephropathy ([address]     Diabetic neuropathy ([address] 12/22/2011    Diabetic ulcer of left midfoot associated with type 2 diabetes mellitus, with fat layer exposed ([address] 07/18/2017    Diverticulosis     C scope + Dr. Amandeep Weiss    DM (diabetes mellitus), type 2 ([address] 2002    DR. 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 Amandeep Weiss    Necrotizing fasciitis [facility])     Nose fracture 1988    Panic attacks     Pericarditis 2003    Hospitalized with s/p heart cath normal    Peripheral autonomic neuropathy due to diabetes mellitus ([address]     Axonal EMG- NCS, March 2011    Sebaceous cyst 09/01/2011    URI (upper respiratory infection) 02/27/2012    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 type 2 diabetes mellitus, with muscle involvement without evidence of necrosis ([address] 9/21/2021    WD-Wound, surgical, infected, initial encounter 12/08/2017    Wrist fracture 1986 PSHX:  has a past surgical history that includes Cardiac catheterization (, ); Tonsillectomy and adenoidectomy (); Upper gastrointestinal endoscopy (2011); Colonoscopy (2011); Nose surgery (); skin biopsy (N/A, 2013); other surgical history (Right, 2015); Toe amputation; Abdomen surgery; other surgical history (2017); pr deep incis foot bone infectn (Left, 2018); Upper gastrointestinal endoscopy (N/A, 2019); Toe amputation (Right, 2019); Toe amputation (Right, 2019); Scrotal surgery (N/A, 05/15/2021); Scrotal surgery (N/A, 2021); Foot surgery (Left, 2021); Upper gastrointestinal endoscopy (N/A, 2022); IR TUNNELED CVC PLACE WO SQ PORT/PUMP > 5 YEARS (2022); IR NONTUNNELED VASCULAR CATHETER > 5 YEARS (2022); and Coronary stent placement. Allergies: Allergies   Allergen Reactions    Adhesive Tape Rash    Doxycycline Nausea And Vomiting    Reglan [Metoclopramide] Anxiety     Fam HX:  family history includes ADHD in his daughter; Bleeding Prob in his mother; Cancer in his mother; Diabetes in his father and sister; Heart Disease in his father; High Blood Pressure in his father, sister, and another family member; Kidney Disease in his father; Mental Illness in his sister and another family member; Obesity in his father and sister; Other in his son; Stroke in his mother.   Soc HX:   Social History     Socioeconomic History    Marital status:    Tobacco Use    Smoking status: Former     Years: 20.00     Types: Cigarettes     Quit date: 2022     Years since quittin.3    Smokeless tobacco: Never    Tobacco comments:     Smokes when drinking/social   Vaping Use    Vaping Use: Never used   Substance and Sexual Activity    Alcohol use: Not Currently     Comment: occ    Drug use: Yes     Frequency: 7.0 times per week     Types: Marijuana Laveda Gough)     Comment: 21 @     Sexual activity: Yes     Partners: Female       Medications:   Medications:    acetaminophen  650 mg Oral Once      Infusions:   PRN Meds:      Labs      CBC:   Recent Labs     11/26/22 0225   WBC 8.8   HGB 9.9*        BMP:    Recent Labs     11/26/22 0225   *   K 4.4   CL 93*   CO2 22   BUN 65*   CREATININE 7.4*   GLUCOSE 237*     Hepatic: No results for input(s): AST, ALT, ALB, BILITOT, ALKPHOS in the last 72 hours. Lipids:   Lab Results   Component Value Date/Time    CHOL 166 09/22/2022 02:02 PM    CHOL 168 10/15/2013 10:30 AM    HDL 30 09/22/2022 02:02 PM    TRIG 254 09/22/2022 02:02 PM     Hemoglobin A1C:   Lab Results   Component Value Date/Time    LABA1C 5.7 09/14/2022 03:47 AM     TSH: No results found for: TSH  Troponin:   Lab Results   Component Value Date/Time    TROPONINT 0.155 09/13/2022 11:49 AM    TROPONINT 0.156 09/12/2022 08:52 PM    TROPONINT 0.119 09/12/2022 09:50 AM     Lactic Acid: No results for input(s): LACTA in the last 72 hours. BNP: No results for input(s): PROBNP in the last 72 hours.   UA:  Lab Results   Component Value Date/Time    NITRU NEGATIVE 07/24/2022 11:40 PM    COLORU YELLOW 07/24/2022 11:40 PM    PHUR 6.5 06/20/2013 09:51 AM    WBCUA 2 07/24/2022 11:40 PM    RBCUA 1 07/24/2022 11:40 PM    MUCUS RARE 07/24/2022 11:40 PM    TRICHOMONAS NONE SEEN 07/24/2022 11:40 PM    BACTERIA NEGATIVE 07/24/2022 11:40 PM    CLARITYU CLEAR 07/24/2022 11:40 PM    SPECGRAV 1.020 07/24/2022 11:40 PM    LEUKOCYTESUR NEGATIVE 07/24/2022 11:40 PM    UROBILINOGEN 0.2 07/24/2022 11:40 PM    BILIRUBINUR NEGATIVE 07/24/2022 11:40 PM    BILIRUBINUR neg 06/20/2013 09:51 AM    BLOODU SMALL NUMBER OR AMOUNT OBSERVED 07/24/2022 11:40 PM    GLUCOSEU 1,000 06/20/2013 09:51 AM    KETUA NEGATIVE 07/24/2022 11:40 PM    AMORPHOUS RARE 07/24/2022 11:40 PM     Urine Cultures: No results found for: LABURIN  Blood Cultures: No results found for: BC  No results found for: BLOODCULT2  Organism:   Lab Results   Component Value Date/Time [facility] 01/07/2019 12:08 AM       Imaging/Diagnostics Last 24 Hours   XR CHEST PORTABLE    Result Date: 11/26/2022  Interval removal of the right internal jugular dialysis catheter. No acute cardiopulmonary disease. Personally reviewed Lab Studies, Imaging, and discussed case with ED Physician.     Electronically signed by Anamaria Whelan MD on 11/26/2022 at 4:57 AM

## 2022-11-26 NOTE — ED NOTES
ED TO INPATIENT SBAR HANDOFF    Patient Name: Jannie Lynch   :  [id number]  52 y.o. MRN:  [id number]  Preferred Name  Natividad Rascon  ED Room #:  ED27/ED-27  Family/Caregiver Present no   Restraints no   Sitter no   Sepsis Risk Score Sepsis Risk Score: 1.57    Situation  Code Status: Prior No additional code details. Allergies: Adhesive tape, Doxycycline, and Reglan [metoclopramide]  Weight: Patient Vitals for the past 96 hrs (Last 3 readings):   Weight   22 0117 290 lb (131.5 kg)     Arrived from: home  Chief Complaint:   Chief Complaint   Patient presents with    Vascular Access Problem     Catheter came out, R upper chest     Hospital Problem/Diagnosis:  Active Problems:    * No active hospital problems. *  Resolved Problems:    * No resolved hospital problems. *    Imaging:   XR CHEST PORTABLE   Preliminary Result   Interval removal of the right internal jugular dialysis catheter. No acute cardiopulmonary disease.            Abnormal labs:   Abnormal Labs Reviewed   CBC WITH AUTO DIFFERENTIAL - Abnormal; Notable for the following components:       Result Value    RBC 3.39 (*)     Hemoglobin 9.9 (*)     Hematocrit 31.2 (*)     MCHC 31.7 (*)     RDW 15.2 (*)     Segs Relative 68.9 (*)     Lymphocytes % 17.1 (*)     Monocytes % 10.9 (*)     Immature Neutrophil % 0.7 (*)     All other components within normal limits   BASIC METABOLIC PANEL - Abnormal; Notable for the following components:    Sodium 130 (*)     Chloride 93 (*)     BUN 65 (*)     Creatinine 7.4 (*)     Est, Glom Filt Rate 8 (*)     Glucose 237 (*)     Calcium 6.9 (*)     All other components within normal limits     Critical values: yes     Abnormal Assessment Findings: has multiple pre-existing wounds    Background  History:   Past Medical History:   Diagnosis Date    Abscess     scrotal    [facility] failure ([facility] 2019    Anxiety associated with depression     Anxiety associated with depression     Back pain 07/02/2012    Chest pain 05/01/2013    Diabetic nephropathy ([address]     Diabetic neuropathy ([address] 12/22/2011    Diabetic ulcer of left midfoot associated with type 2 diabetes mellitus, with fat layer exposed ([address] 07/18/2017    Diverticulosis     C scope + Dr. Dell Norman DM (diabetes mellitus), type 2 ([address] 2002    DR. 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 Dell Norman Necrotizing fasciitis [facility])     Nose fracture 1988    Panic attacks     Pericarditis 2003    Hospitalized with s/p heart cath normal    Peripheral autonomic neuropathy due to diabetes mellitus ([address]     Axonal EMG- NCS, March 2011    Sebaceous cyst 09/01/2011    URI (upper respiratory infection) 02/27/2012    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 type 2 diabetes mellitus, with muscle involvement without evidence of necrosis ([address] 9/21/2021    WD-Wound, surgical, infected, initial encounter 12/08/2017    Wrist fracture 1986       Assessment    Vitals/MEWS: MEWS Score: 3  Level of Consciousness: Alert (0)   Vitals:    11/26/22 0117 11/26/22 0230 11/26/22 0433   BP: (!) 184/91 (!) 207/101 (!) 201/108   Pulse: 100 93 95   Resp: 17  18   Temp: 98.3 °F (36.8 °C)  98 °F (36.7 °C)   TempSrc: Oral  Oral   SpO2: 98% 96% 95%   Weight: 290 lb (131.5 kg)     Height: 5' 9\" (1.753 m)       FiO2 (%):   O2 Flow Rate: O2 Device: None (Room air)    Cardiac Rhythm:    Pain Assessment:  [x] Verbal [] Sadi Route Scale  Pain Scale: Pain Assessment  Pain Assessment: 0-10  Patient's Stated Pain Goal: 6  Last documented pain score (0-10 scale)    Last documented pain medication administered:   Mental Status: oriented and alert  NIH Score:    C-SSRS: Risk of Suicide: No Risk  Bedside swallow:    [place] Coma Scale (GCS): Rosanna Coma Scale  Eye Opening: Spontaneous  Best Verbal Response: Oriented  Best Motor Response: Obeys commands  Rosanna Coma Scale Score: 15  Active LDA's:   Peripheral IV 11/26/22 Right Antecubital (Active)     PO Status: Regular  Pertinent or High Risk Medications/Drips: no   o If Yes, please provide details:   Pending Blood Product Administration: no     You may also review the ED PT Care Timeline found under the Summary Nursing Index tab.     Recommendation    Pending orders   Plan for Discharge (if known): home  Additional Comments: MWF dialysis patient   If any further questions, please call Sending RN at [phone]    Electronically signed by: Electronically signed by Marylee Bailey, RN on 11/26/2022 at [place] [id number]  11/26/22 2043

## 2022-11-26 NOTE — ED PROVIDER NOTES
Triage Chief Complaint:   Vascular Access Problem (Catheter came out, R upper chest)    [place]:  Today in the ED I had the pleasure of caring for Fausto Francois who is a 52 y.o. male that presents to the emergency department complaining of vascular access complication patient has a history of end-stage renal disease. For which she has a dialysis catheter subclavicular. Placed several months ago. Patient states that today when he was getting undressed ready for bed. He took his shirt off and the catheter fell out alongside it. He denies any large amount of bleeding. Denies lightheadedness dizziness no chest pain or shortness of breath. No trauma. He denies any tugging noted. Dialysis yesterday. He is due for dialysis tomorrow. He denies any lightheadedness or dizziness no fevers chills nausea vomiting or diarrhea. ROS:  REVIEW OF SYSTEMS    At least 10 systems reviewed      All other review of systems are negative  See HPI and nursing notes for additional information       Past Medical History:   Diagnosis Date    Abscess 2010    scrotal    Acute renal failure (ARF) ([address] 08/04/2019    Anxiety associated with depression     Anxiety associated with depression     Back pain 07/02/2012    Chest pain 05/01/2013    Diabetic nephropathy ([address]     Diabetic neuropathy ([address] 12/22/2011    Diabetic ulcer of left midfoot associated with type 2 diabetes mellitus, with fat layer exposed ([address] 07/18/2017    Diverticulosis     C scope + Dr. Meron Monaco    DM (diabetes mellitus), type 2 ([address] 2002    DR. 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 Meron Monaco    Necrotizing fasciitis [facility])     Nose fracture 1988    Panic attacks     Pericarditis 2003    Hospitalized with s/p heart cath normal    Peripheral autonomic neuropathy due to diabetes mellitus ([facility]     Axonal EMG- NCS, March 2011    Sebaceous cyst 09/01/2011    URI (upper respiratory infection) 02/27/2012    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 type 2 diabetes mellitus, with muscle involvement without evidence of necrosis ([facility] 9/21/2021    WD-Wound, surgical, infected, initial encounter 12/08/2017    Wrist fracture 1986     Past Surgical History:   Procedure Laterality Date    [address] [facility] [address]  2003, 2013    Normal (Dr. Jaida Menjivar)    COLONOSCOPY  06/02/2011    Pandiverticulosis, Nonbleeding internal hemorrhoids, Repeat colonoscopy at age 48- Dr. Angelita Matamoros      x 3    FOOT SURGERY Left 12/21/2021    EXCISION OF HEAD LEFT 2ND METATARSAL performed by Meredith Stoner DPM at [address] [facility] NONTUNNELED VASCULAR CATHETER  07/25/2022    IR NONTUNNELED VASCULAR CATHETER 7/25/2022 [facility] SPECIAL PROCEDURES    IR TUNNELED CATHETER PLACEMENT GREATER THAN 5 YEARS  07/27/2022    IR TUNNELED CATHETER PLACEMENT GREATER THAN 5 YEARS 7/27/2022 [address]    NOSE SURGERY  1993    \"had reconstruction surgery on nose a year after the other nose surgery\"    OTHER SURGICAL HISTORY Right 05/22/2015    I & D right great toe with partial amputation    OTHER SURGICAL HISTORY  12/04/2017    inc and drainage of abcess    DE DEEP INCIS FOOT BONE INFECTN Left 09/22/2018    LEFT FOOT DEBRIDEMENT INCISION AND DRAINAGE TOP AND BOTTOM performed by Elias Maldonado DPM at [place] [address] 05/15/2021    SCROTAL AND PERINEAL DEBRIDEMENT performed by Eagle Cary MD at [place] [address] 05/16/2021    SCROTAL RE-DEBRIDEMENT  INCISION AND DRAINAGE performed by Eagle Cary MD at [address] [facility] N/A 06/18/2013    sebaceous cyst removal times 4     TOE AMPUTATION      right great toe    TOE AMPUTATION Right 03/23/2019    TOE AMPUTATION RIGHT 5TH TOE performed by Elias Maldonado DPM at [place] [address] TOE AMPUTATION Right 2019    TOE AMPUTATION RIGHT 4TH TOE performed by Medina Rodriguez DPM at [address] [facility] ENDOSCOPY  2011    Mild gastritis with moderatley severe antritis, small hiatal hernia, Dr. Smith Harmon 2019    EGD BIOPSY performed by Sofie Mcwilliams MD at [place] [address] N/A 2022    EGD DIAGNOSTIC ONLY performed by Sofie Mcwilliams MD at [facility] ENDOSCOPY     Family History   Problem Relation Age of Onset    Cancer Mother         ?site    Stroke Mother     Bleeding Prob Mother     Diabetes Father     Heart Disease Father     High Blood Pressure Father     Obesity Father     Kidney Disease Father     Diabetes Sister     High Blood Pressure Sister     Mental Illness Sister     Obesity Sister     High Blood Pressure Other     Mental Illness Other         bipolar    Other Son         cyst in ear canal    ADHD Daughter      Social History     Socioeconomic History    Marital status:      Spouse name: Not on file    Number of children: Not on file    Years of education: Not on file    Highest education level: Not on file   Occupational History    Not on file   Tobacco Use    Smoking status: Former     Years: 20.00     Types: Cigarettes     Quit date: 2022     Years since quittin.3    Smokeless tobacco: Never    Tobacco comments:     Smokes when drinking/social   Vaping Use    Vaping Use: Never used   Substance and Sexual Activity    Alcohol use: Not Currently     Comment: occ    Drug use: Yes     Frequency: 7.0 times per week     Types: Marijuana Deadra Eduard)     Comment: 21 @     Sexual activity: Yes     Partners: Female   Other Topics Concern    Not on file   Social History Narrative    Not on file     Social Determinants of Health     Financial Resource Strain: Not on file   Food Insecurity: Not on file   Transportation Needs: Not on file Physical Activity: Not on file   Stress: Not on file   Social Connections: Not on file   Intimate Partner Violence: Not on file   Housing Stability: Not on file     Current Facility-Administered Medications   Medication Dose Route Frequency Provider Last Rate Last Admin    calcium carbonate (TUMS) chewable tablet 1,000 mg  1,000 mg Oral Once France Zavaleta PA-C         Current Outpatient Medications   Medication Sig Dispense Refill    atorvastatin (LIPITOR) 40 MG tablet Take 1 tablet by mouth nightly 30 tablet 3    carvedilol (COREG) 12.5 MG tablet Take 1 tablet by mouth 2 times daily (with meals) 60 tablet 3    epoetin paola-epbx (RETACRIT) 67851 UNIT/ML SOLN injection Inject 1 mL into the skin three times a week To be given in HD by nurse 6.6 mL 1    Multiple Vitamins-Minerals (THERAPEUTIC MULTIVITAMIN-MINERALS) tablet Take 1 tablet by mouth daily 30 tablet 1    amLODIPine (NORVASC) 10 MG tablet Take 1 tablet by mouth daily 30 tablet 3    B Complex-C-Folic Acid (ALEX-JEFFERSON) TABS Take 1 tablet by mouth daily      methocarbamol (ROBAXIN) 500 MG tablet Take 500 mg by mouth nightly      sevelamer (RENVELA) 800 MG tablet Take 800 mg by mouth 3 times daily (with meals)      bacitracin 500 UNIT/GM ointment Apply topically      ranolazine (RANEXA) 500 MG extended release tablet Take 1 tablet by mouth 2 times daily 60 tablet 3    aspirin 81 MG EC tablet Take 1 tablet by mouth daily 30 tablet 0    clopidogrel (PLAVIX) 75 MG tablet Take 1 tablet by mouth daily 30 tablet 0    oxyCODONE-acetaminophen (PERCOCET) 5-325 MG per tablet Take 1.5 tablets by mouth daily. traZODone (DESYREL) 50 MG tablet Take 1 tablet by mouth nightly as needed for Sleep 30 tablet 0    alogliptin (NESINA) 6.25 MG TABS tablet Take 1 tablet by mouth in the morning. 30 tablet 0    insulin lispro, 1 Unit Dial, (HUMALOG KWIKPEN) 100 UNIT/ML SOPN Inject 20 Units into the skin in the morning and 20 Units at noon and 20 Units in the evening.  Inject before meals. (Patient taking differently: Inject 35 Units into the skin 3 times daily (before meals)) 5 pen 0    insulin glargine (LANTUS SOLOSTAR) 100 UNIT/ML injection pen Inject 45 Units into the skin nightly (Patient taking differently: Inject 75 Units into the skin nightly) 5 pen 0    ARIPiprazole (ABILIFY) 5 MG tablet Take 1 tablet by mouth at bedtime 30 tablet 0    pantoprazole (PROTONIX) 40 MG tablet Take 1 tablet by mouth in the morning and 1 tablet in the evening. Take before meals. 30 tablet 0    sucralfate (CARAFATE) 1 GM tablet Take 1 tablet by mouth in the morning and 1 tablet in the evening. Take before meals. (Patient not taking: No sig reported) 120 tablet 3    calcitRIOL (ROCALTROL) 0.25 MCG capsule Take 1 capsule by mouth in the morning. 30 capsule 0    ondansetron (ZOFRAN) 4 MG tablet Take 1 tablet by mouth every 8 hours as needed for Nausea or Vomiting 20 tablet 0    Lancets MISC 1 each by Does not apply route daily 100 each 3    glucose monitoring kit (FREESTYLE) monitoring kit 1 each by Does not apply route once for 1 dose. 1 kit 0     Allergies   Allergen Reactions    Adhesive Tape Rash    Doxycycline Nausea And Vomiting    Reglan [Metoclopramide] Anxiety       Nursing Notes Reviewed    Physical Exam:  ED Triage Vitals [11/26/22 0117]   Enc Vitals Group      BP (!) 184/91      Heart Rate 100      Resp 17      Temp 98.3 °F (36.8 °C)      Temp Source Oral      SpO2 98 %      Weight 290 lb (131.5 kg)      Height 5' 9\" (1.753 m)      Head Circumference       Peak Flow       Pain Score       Pain Loc       Pain Edu? Excl. in [address]? General :Patient is awake alert oriented person place and time no acute distress nontoxic appearing  HEENT: Pupils are equally round and reactive to light extraocular motors are intact conjunctivae clear sclerae white there is no injection no icterus. Nose without any rhinorrhea or epistaxis. Oral mucosa is moist no exudate buccal mucosa shows no ulcerations. Uvula is midline    Neck: Neck is supple full range of motion trachea midline thyroid nonpalpable  Cardiac: Heart regular rate rhythm no murmurs rubs clicks or gallops  Lungs: Lungs are clear to auscultation there is no wheezing rhonchi or rales. There is no use of accessory muscles no nasal flaring identified. Chest wall: There is no tenderness to palpation over the chest wall or over ribs. There is a tiny open wound noted to the cysts in the right subclavicular region. With no active bleeding noted surrounding erythema no discharge. No induration. Abdomen: Abdomen is soft nontender nondistended. There is no firm or pulsatile masses no rebound rigidity or guarding negative Carl's negative McBurney, no peritoneal signs  Suprapubic:  there is no tenderness to palpation over the external bladder   Musculoskeletal: 5 out of 5 strength in all 4 extremities full flexion extension abduction and adduction supination pronation of all extremities and all digits. No obvious muscle atrophy is noted. No focal muscle deficits are appreciated  Dermatology: Skin is warm and dry there is no obvious abscesses lacerations or lesions noted  Psych: Mentation is grossly normal cognition is grossly normal. Affect is appropriate  Neuro: Motor intact sensory intact cranial nerves II through XII are intact level of consciousness is normal cerebellar function is normal reflexes are grossly normal. No evidence of incontinence or loss of bowel or bladder no saddle anesthesia noted Lymphatic: There is no submandibular or cervical adenopathy appreciated.         I have reviewed and interpreted all of the currently available lab results from this visit (if applicable):  Results for orders placed or performed during the hospital encounter of 11/26/22   CBC with Auto Differential   Result Value Ref Range    WBC 8.8 4.0 - 10.5 K/CU MM    RBC 3.39 (L) 4.6 - 6.2 M/CU MM    Hemoglobin 9.9 (L) 13.5 - 18.0 GM/DL    Hematocrit 31.2 (L) 42 - 52 % MCV 92.0 78 - 100 FL    MCH 29.2 27 - 31 PG    MCHC 31.7 (L) 32.0 - 36.0 %    RDW 15.2 (H) 11.7 - 14.9 %    Platelets 892 417 - 899 K/CU MM    MPV 9.8 7.5 - 11.1 FL    Differential Type AUTOMATED DIFFERENTIAL     Segs Relative 68.9 (H) 36 - 66 %    Lymphocytes % 17.1 (L) 24 - 44 %    Monocytes % 10.9 (H) 0 - 4 %    Eosinophils % 1.8 0 - 3 %    Basophils % 0.6 0 - 1 %    Segs Absolute 6.0 K/CU MM    Lymphocytes Absolute 1.5 K/CU MM    Monocytes Absolute 1.0 K/CU MM    Eosinophils Absolute 0.2 K/CU MM    Basophils Absolute 0.1 K/CU MM    Nucleated RBC % 0.0 %    Total Nucleated RBC 0.0 K/CU MM    Total Immature Neutrophil 0.06 K/CU MM    Immature Neutrophil % 0.7 (H) 0 - 0.43 %   Basic Metabolic Panel   Result Value Ref Range    Sodium 130 (L) 135 - 145 MMOL/L    Potassium 4.4 3.5 - 5.1 MMOL/L    Chloride 93 (L) 99 - 110 mMol/L    CO2 22 21 - 32 MMOL/L    Anion Gap 15 4 - 16    BUN 65 (H) 6 - 23 MG/DL    Creatinine 7.4 (H) 0.9 - 1.3 MG/DL    Est, Glom Filt Rate 8 (L) >60 mL/min/1.73m2    Glucose 237 (H) 70 - 99 MG/DL    Calcium 6.9 (LL) 8.3 - 10.6 MG/DL      Radiographs (if obtained):  [] The following radiograph was interpreted by myself in the absence of a radiologist:   [] Radiologist's Report Reviewed:  XR CHEST PORTABLE   Preliminary Result   Interval removal of the right internal jugular dialysis catheter. No acute cardiopulmonary disease. EKG (if obtained):   Please See Note of attending physician for EKG interpretation. Chart review shows recent radiograph(s):  XR CHEST PORTABLE    Result Date: 11/13/2022  EXAMINATION: ONE XRAY VIEW OF THE CHEST 11/13/2022 6:20 pm COMPARISON: 09/12/2022 HISTORY: ORDERING SYSTEM PROVIDED HISTORY: cough, shortness of breath TECHNOLOGIST PROVIDED HISTORY: Reason for exam:->cough, shortness of breath Reason for Exam: cough, sob FINDINGS: Right-sided temporary dialysis catheter tip is in the lower SVC.   Pulmonary vascular congestion without overt edema or consolidation. No pneumothorax or pleural effusion. Cardiac and mediastinal contours stable. No acute osseous abnormality. Pulmonary vascular congestion without overt edema or consolidation. MDM:     Interventions given this visit:   Orders Placed This Encounter   Medications    calcium carbonate (TUMS) chewable tablet 1,000 mg       This patient hemodynamically stable a bit hypertensive here in the emergency department however is due for dialysis. Presents with vascular access problem. Dialysis catheter today to follow-up. No bleeding or hemorrhagic process he is hemodynamically stable. Chest x-ray clean. Will be admitted to the hospital.  For further evaluation for likely vascular asked this placement. Incidental finding hypocalcemia noted. Patient is provided with Tums     I independently managed patient today in the ED.     BP (!) 207/101   Pulse 93   Temp 98.3 °F (36.8 °C) (Oral)   Resp 17   Ht 5' 9\" (1.753 m)   Wt 290 lb (131.5 kg)   SpO2 96%   BMI 42.83 kg/m²       Clinical Impression:  1. Problem with vascular access    2. Hypocalcemia        Disposition referral (if applicable):  No follow-up provider specified. Disposition medications (if applicable):  New Prescriptions    No medications on file         Comment: Please note this report has been produced using speech recognition software and may contain errors related to that system including errors in grammar, punctuation, and spelling, as well as words and phrases that may be inappropriate. If there are any questions or concerns please feel free to contact the dictating provider for clarification.       Sheyla Gtz, [address] [place] [address]  11/26/22 8866

## 2022-11-26 NOTE — CONSULTS
Nephrology Service Consultation      [address], [address] [place] [address]  Phone: [phone]  Office Hours: 8:30AM - 4:30PM  Monday - Friday        MEDICAL DECISION MAKING and Recommendations     -HD cath accidental removal  -HTN  -Hypocalcemia   -CAD s/p coronary stents that are less than 1y ear old  -ESRD on HD MWF  -HTN  -DM2  -LUE AVF not mature yet  -Diabetic foot ulcer hx    Suggest:  -Obtain an ionized calcium level  -Keep on low K diet until next HD session  -IR consult for tunnelled HD cath on Monday//unclear if he can stay off the antiplatelets, for few days, since his coronary stents are still new, may need his cardiologist' opinion on that//   -Continue calcitriol  -Continue the antihypertensives    Thank you        Patient Active Problem List    Diagnosis Date Noted    Problem with vascular access 11/26/2022    ESRD (end stage renal disease) ([address] 09/12/2022    WD - Ulcer of forefoot due to type 2 diabetes mellitus ([address] 09/01/2022    NSTEMI (non-ST elevated myocardial infarction) ([address] 08/24/2022    Chest tightness 08/22/2022    CARMEN (acute kidney injury) ([address] 07/25/2022    Anemia 07/25/2022    Recurrent major depressive disorder, in full remission ([address] 05/18/2021    Generalized anxiety disorder 05/18/2021    Surgical wound dehiscence 02/08/2022    Chest pain 12/21/2021    Other proteinuria     FH: CAD (coronary artery disease) 09/29/2021    ASCVD (arteriosclerotic cardiovascular disease) 09/29/2021    Chronic kidney disease, stage IV (severe) ([address] 09/28/2021    Nephrotic syndrome 09/22/2021    Generalized edema 09/22/2021    Stage 3b chronic kidney disease ([address] 09/22/2021    Diabetic nephropathy associated with type 2 diabetes mellitus ([address] 09/22/2021    Hypoalbuminemia 09/22/2021    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 type 2 diabetes mellitus, with muscle involvement without evidence of necrosis ([address] 09/21/2021    Right groin wound 06/09/2021 Ulcer of right groin with fat layer exposed ([address]     Syncope 06/05/2021    Necrotizing fasciitis ([address] 05/24/2021    Morbid obesity with body mass index (BMI) of 40.0 to 44.9 in adult [facility]) 05/21/2021    Irene gangrene     Cellulitis, scrotum 05/13/2021    Acute epididymitis     Cellulitis of left arm 04/03/2021    Cellulitis 03/23/2021    Acute renal failure (ARF) ([address] 08/04/2019    Diabetic foot infection ([address] 03/21/2019    Intractable nausea and vomiting 01/11/2019    Uncontrolled type 2 diabetes mellitus 01/11/2019    Nausea and vomiting 01/11/2019    Constipation 10/07/2018    Cellulitis of left foot     Sepsis ([address] 88/85/8901    Periumbilical hernia     Abscess 12/03/2017    Osteomyelitis ([address] 05/22/2015    Bronchitis 10/15/2013    Hyperlipidemia with target LDL less than 100 07/15/2013    Essential hypertension 07/15/2013    Bilateral carpal tunnel syndrome- left worse than right 06/24/2013    DANIELA (obstructive sleep apnea) 06/20/2013    Urinary frequency 06/20/2013    Neck pain 05/16/2013    Anxiety associated with depression     Panic attack 02/01/2012    Diabetic neuropathy ([address] 12/22/2011    Hiatal hernia 02/04/2011    Diabetes mellitus type 2, improved controlled 02/04/2011         Patient:  Ankit Conner  MRN: [id number]  Consulting physician:  Courtney Taylor, *  Reason for Consult: office pt  PCP: Ricki Roque MD    HISTORY OF PRESENT ILLNESS:   The patient is a 52 y.o. male with CAD s/p recent coronary stents, HTN, DM2, ESRD on HD MWF presented as his tunnelled HD cath came out yesterday. He has LUE AVF that is only 2 month old thus not ready for any cannulation  Renal consult as he is a patient of the practice. He gets HD on MWF and had his regular HD session yesterday. He is on room air    REVIEW OF SYSTEMS:  14 point ROS is Negative.  See positive ROS per HPI    Past Medical History:        Diagnosis Date    Abscess 2010    scrotal    Acute renal failure (ARF) ([address] 08/04/2019    Anxiety associated with depression     Anxiety associated with depression     Back pain 07/02/2012    Chest pain 05/01/2013    Diabetic nephropathy ([address]     Diabetic neuropathy ([address] 12/22/2011    Diabetic ulcer of left midfoot associated with type 2 diabetes mellitus, with fat layer exposed ([address] 07/18/2017    Diverticulosis     C scope + Dr. Ang Mcgregor    DM (diabetes mellitus), type 2 ([address] 2002    DR. 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 Ang Mcgregor    Necrotizing fasciitis [facility])     Nose fracture 1988    Panic attacks     Pericarditis 2003    Hospitalized with s/p heart cath normal    Peripheral autonomic neuropathy due to diabetes mellitus ([address]     Axonal EMG- NCS, March 2011    Sebaceous cyst 09/01/2011    URI (upper respiratory infection) 02/27/2012    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 type 2 diabetes mellitus, with muscle involvement without evidence of necrosis ([address] 9/21/2021    WD-Wound, surgical, infected, initial encounter 12/08/2017    Wrist fracture 1986       Past Surgical History:        Procedure Laterality Date    [facility]  2003, 2013    Normal (Dr. Prince Bentley)    COLONOSCOPY  06/02/2011    Pandiverticulosis, Nonbleeding internal hemorrhoids, Repeat colonoscopy at age 48- Dr. Danny Strauss      x 3    FOOT SURGERY Left 12/21/2021    EXCISION OF HEAD LEFT 2ND METATARSAL performed by Presley Vela DPM at [address] [facility] NONTUNNELED VASCULAR CATHETER  07/25/2022    IR NONTUNNELED VASCULAR CATHETER 7/25/2022 [facility] SPECIAL PROCEDURES    IR TUNNELED CATHETER PLACEMENT GREATER THAN 5 YEARS  07/27/2022    IR TUNNELED CATHETER PLACEMENT GREATER THAN 5 YEARS 7/27/2022 [facility] SPECIAL PROCEDURES    NOSE SURGERY 46    \"had reconstruction surgery on nose a year after the other nose surgery\"    OTHER SURGICAL HISTORY Right 05/22/2015    I & D right great toe with partial amputation    OTHER SURGICAL HISTORY  12/04/2017    inc and drainage of abcess    VT DEEP INCIS FOOT BONE INFECTN Left 09/22/2018    LEFT FOOT DEBRIDEMENT INCISION AND DRAINAGE TOP AND BOTTOM performed by Rosemarie Hector DPM at [facility] N/A 05/15/2021    SCROTAL AND PERINEAL DEBRIDEMENT performed by Monique De Los Santos MD at [facility] 05/16/2021    SCROTAL RE-DEBRIDEMENT  INCISION AND DRAINAGE performed by Monique De Los Santos MD at [address] N/A 06/18/2013    sebaceous cyst removal times 4     TOE AMPUTATION      right great toe    TOE AMPUTATION Right 03/23/2019    TOE AMPUTATION RIGHT 5TH TOE performed by Rosemarie Hector DPM at [address] Right 08/06/2019    TOE AMPUTATION RIGHT 4TH TOE performed by Rosemarie Hector DPM at [address] [facility] ENDOSCOPY  06/02/2011    Mild gastritis with moderatley severe antritis, small hiatal hernia, Dr. Wander Ferguson N/A 01/12/2019    EGD BIOPSY performed by Maya Anderson MD at [address] [facility] N/A 07/26/2022    EGD DIAGNOSTIC ONLY performed by Maya Anderson MD at [facility] ENDOSCOPY       Medications:   Prior to Admission medications    Medication Sig Start Date End Date Taking?  Authorizing Provider   atorvastatin (LIPITOR) 40 MG tablet Take 1 tablet by mouth nightly 9/14/22   Coreen Retana MD   carvedilol (COREG) 12.5 MG tablet Take 1 tablet by mouth 2 times daily (with meals) 9/14/22   Coreen Retana MD   epoetin paola-epbx (RETACRIT) 76821 UNIT/ML SOLN injection Inject 1 mL into the skin three times a week To be given in HD by nurse 9/16/22   Coreen Retana MD   Multiple Vitamins-Minerals (THERAPEUTIC MULTIVITAMIN-MINERALS) tablet Take 1 tablet by Paul Ascencio MD   pantoprazole (PROTONIX) 40 MG tablet Take 1 tablet by mouth in the morning and 1 tablet in the evening. Take before meals. 8/3/22   Paul Ascencio MD   sucralfate (CARAFATE) 1 GM tablet Take 1 tablet by mouth in the morning and 1 tablet in the evening. Take before meals. Patient not taking: No sig reported 8/3/22   Paul Ascencio MD   calcitRIOL (ROCALTROL) 0.25 MCG capsule Take 1 capsule by mouth in the morning. 8/4/22   Paul Ascencio MD   furosemide (LASIX) 40 MG tablet Take 1 tablet by mouth daily 9/22/21 8/3/22  Mackenzie Sorto MD   insulin aspart (NOVOLOG) 100 UNIT/ML injection vial Inject 35 Units into the skin 3 times daily (before meals)  8/3/22  Deejay Devine MD   chlorthalidone (HYGROTON) 25 MG tablet Take 1 tablet by mouth daily 6/15/21 8/3/22  Franck Heart MD   linagliptin (TRADJENTA) 5 MG tablet Take 1 tablet by mouth daily 3/29/21 8/3/22  Franck Heart MD   ondansetron (ZOFRAN) 4 MG tablet Take 1 tablet by mouth every 8 hours as needed for Nausea or Vomiting 9/23/18   Delma Canavan, MD   Lancets MISC 1 each by Does not apply route daily 9/21/18   Delma Canavan, MD   glucose monitoring kit (FREESTYLE) monitoring kit 1 each by Does not apply route once for 1 dose. 6/20/13 6/20/13  Vinnie Pascual MD        Allergies:  Adhesive tape, Doxycycline, and Reglan [metoclopramide]    Social History:   TOBACCO:   reports that he quit smoking about 4 months ago. His smoking use included cigarettes. He has never used smokeless tobacco.  ETOH:   reports that he does not currently use alcohol.   OCCUPATION:      Family History:       Problem Relation Age of Onset    Cancer Mother         ?site    Stroke Mother     Bleeding Prob Mother     Diabetes Father     Heart Disease Father     High Blood Pressure Father     Obesity Father     Kidney Disease Father     Diabetes Sister     High Blood Pressure Sister     Mental Illness Sister     Obesity Sister     High Blood Pressure Other     Mental Illness Other         bipolar    Other Son         cyst in ear canal    ADHD Daughter      Physical Exam:    Vitals: BP (!) 196/101   Pulse 95   Temp 98 °F (36.7 °C) (Oral)   Resp 18   Ht 5' 9\" (1.753 m)   Wt 290 lb (131.5 kg)   SpO2 97%   BMI 42.83 kg/m²   General appearance: in no acute distress, appears stated age  Skin: Skin color, texture, turgor normal. No rashes or lesions  HEENT: normocephalic, atraumatic  Neck: supple, trachea midline  Lungs: clear to auscultation bilaterally, breathing comfortably  Heart[de-identified] regular rate and rhythm, S1, S2 normal,  Abdomen: soft, non-tender; bowel sounds normal; no masses,   Extremities: extremities normal, atraumatic, no cyanosis or edema, LUE AVF with good thrill  Neurologic: Mental status: alert, oriented, interactive, following commands  Psychiatric: mood and affect appropriate     CBC:   Recent Labs     11/26/22 0225   WBC 8.8   HGB 9.9*        BMP:    Recent Labs     11/26/22 0225   *   K 4.4   CL 93*   CO2 22   BUN 65*   CREATININE 7.4*   GLUCOSE 237*            Electronically signed by Fifi Cordoba DO on 11/26/2022 at 7:35 AM    [facility]  MD Zohreh Noyola DO [address],  [place]  [facility], [place] [address]  PHONE: [phone]  FAX: [phone]

## 2022-11-26 NOTE — PROGRESS NOTES
I have seen and evaluated patient, I agree with my colleagues note from earlier today, I agree with history physical examination assessment and plan. Will hold aspirin and Plavix and consult cardiology to make sure it is okay to hold them in the setting of upcoming procedure for tunneled catheter with IR Monday. NS calcium came back low and patient is getting repletion. Will consult wound for diabetic ulcer bottom of left foot. Continue to monitor patient.

## 2022-11-27 LAB
ANION GAP SERPL CALCULATED.3IONS-SCNC: 17 MMOL/L (ref 4–16)
BASOPHILS ABSOLUTE: 0 K/CU MM
BASOPHILS RELATIVE PERCENT: 0.5 % (ref 0–1)
BUN BLDV-MCNC: 87 MG/DL (ref 6–23)
CALCIUM SERPL-MCNC: 7.1 MG/DL (ref 8.3–10.6)
CHLORIDE BLD-SCNC: 97 MMOL/L (ref 99–110)
CO2: 18 MMOL/L (ref 21–32)
CREAT SERPL-MCNC: 9.3 MG/DL (ref 0.9–1.3)
DIFFERENTIAL TYPE: ABNORMAL
EOSINOPHILS ABSOLUTE: 0.2 K/CU MM
EOSINOPHILS RELATIVE PERCENT: 2.4 % (ref 0–3)
GFR SERPL CREATININE-BSD FRML MDRD: 6 ML/MIN/1.73M2
GLUCOSE BLD-MCNC: 159 MG/DL (ref 70–99)
GLUCOSE BLD-MCNC: 188 MG/DL (ref 70–99)
GLUCOSE BLD-MCNC: 192 MG/DL (ref 70–99)
GLUCOSE BLD-MCNC: 261 MG/DL (ref 70–99)
GLUCOSE BLD-MCNC: 96 MG/DL (ref 70–99)
GLUCOSE BLD-MCNC: 99 MG/DL (ref 70–99)
HCT VFR BLD CALC: 30.8 % (ref 42–52)
HEMOGLOBIN: 9.7 GM/DL (ref 13.5–18)
IMMATURE NEUTROPHIL %: 0.5 % (ref 0–0.43)
LYMPHOCYTES ABSOLUTE: 0.6 K/CU MM
LYMPHOCYTES RELATIVE PERCENT: 7.9 % (ref 24–44)
MCH RBC QN AUTO: 29.2 PG (ref 27–31)
MCHC RBC AUTO-ENTMCNC: 31.5 % (ref 32–36)
MCV RBC AUTO: 92.8 FL (ref 78–100)
MONOCYTES ABSOLUTE: 0.8 K/CU MM
MONOCYTES RELATIVE PERCENT: 10.6 % (ref 0–4)
NUCLEATED RBC %: 0 %
PDW BLD-RTO: 14.7 % (ref 11.7–14.9)
PLATELET # BLD: 201 K/CU MM (ref 140–440)
PMV BLD AUTO: 10.4 FL (ref 7.5–11.1)
POTASSIUM SERPL-SCNC: 6 MMOL/L (ref 3.5–5.1)
RBC # BLD: 3.32 M/CU MM (ref 4.6–6.2)
SEGMENTED NEUTROPHILS ABSOLUTE COUNT: 6.2 K/CU MM
SEGMENTED NEUTROPHILS RELATIVE PERCENT: 78.1 % (ref 36–66)
SODIUM BLD-SCNC: 132 MMOL/L (ref 135–145)
TOTAL IMMATURE NEUTOROPHIL: 0.04 K/CU MM
TOTAL NUCLEATED RBC: 0 K/CU MM
WBC # BLD: 7.9 K/CU MM (ref 4–10.5)

## 2022-11-27 PROCEDURE — 2580000003 HC RX 258: Performed by: INTERNAL MEDICINE

## 2022-11-27 PROCEDURE — 6360000002 HC RX W HCPCS: Performed by: INTERNAL MEDICINE

## 2022-11-27 PROCEDURE — 82962 GLUCOSE BLOOD TEST: CPT

## 2022-11-27 PROCEDURE — 6370000000 HC RX 637 (ALT 250 FOR IP): Performed by: STUDENT IN AN ORGANIZED HEALTH CARE EDUCATION/TRAINING PROGRAM

## 2022-11-27 PROCEDURE — 96361 HYDRATE IV INFUSION ADD-ON: CPT

## 2022-11-27 PROCEDURE — 96365 THER/PROPH/DIAG IV INF INIT: CPT

## 2022-11-27 PROCEDURE — 96375 TX/PRO/DX INJ NEW DRUG ADDON: CPT

## 2022-11-27 PROCEDURE — 36415 COLL VENOUS BLD VENIPUNCTURE: CPT

## 2022-11-27 PROCEDURE — 80048 BASIC METABOLIC PNL TOTAL CA: CPT

## 2022-11-27 PROCEDURE — 6360000002 HC RX W HCPCS: Performed by: STUDENT IN AN ORGANIZED HEALTH CARE EDUCATION/TRAINING PROGRAM

## 2022-11-27 PROCEDURE — 99232 SBSQ HOSP IP/OBS MODERATE 35: CPT | Performed by: INTERNAL MEDICINE

## 2022-11-27 PROCEDURE — 85025 COMPLETE CBC W/AUTO DIFF WBC: CPT

## 2022-11-27 PROCEDURE — 6370000000 HC RX 637 (ALT 250 FOR IP): Performed by: INTERNAL MEDICINE

## 2022-11-27 PROCEDURE — G0378 HOSPITAL OBSERVATION PER HR: HCPCS

## 2022-11-27 PROCEDURE — 2500000003 HC RX 250 WO HCPCS: Performed by: INTERNAL MEDICINE

## 2022-11-27 PROCEDURE — 94761 N-INVAS EAR/PLS OXIMETRY MLT: CPT

## 2022-11-27 PROCEDURE — 96366 THER/PROPH/DIAG IV INF ADDON: CPT

## 2022-11-27 PROCEDURE — 96372 THER/PROPH/DIAG INJ SC/IM: CPT

## 2022-11-27 PROCEDURE — 2580000003 HC RX 258: Performed by: STUDENT IN AN ORGANIZED HEALTH CARE EDUCATION/TRAINING PROGRAM

## 2022-11-27 RX ORDER — DEXTROSE MONOHYDRATE 100 MG/ML
INJECTION, SOLUTION INTRAVENOUS CONTINUOUS PRN
Status: DISCONTINUED | OUTPATIENT
Start: 2022-11-27 | End: 2022-11-29 | Stop reason: HOSPADM

## 2022-11-27 RX ORDER — CALCIUM GLUCONATE 20 MG/ML
2000 INJECTION, SOLUTION INTRAVENOUS ONCE
Status: COMPLETED | OUTPATIENT
Start: 2022-11-27 | End: 2022-11-27

## 2022-11-27 RX ADMIN — CALCIUM CARBONATE 1000 MG: 500 TABLET, CHEWABLE ORAL at 12:46

## 2022-11-27 RX ADMIN — SODIUM ZIRCONIUM CYCLOSILICATE 10 G: 10 POWDER, FOR SUSPENSION ORAL at 14:15

## 2022-11-27 RX ADMIN — INSULIN LISPRO 20 UNITS: 100 INJECTION, SOLUTION INTRAVENOUS; SUBCUTANEOUS at 12:47

## 2022-11-27 RX ADMIN — INSULIN LISPRO 4 UNITS: 100 INJECTION, SOLUTION INTRAVENOUS; SUBCUTANEOUS at 12:47

## 2022-11-27 RX ADMIN — CALCITRIOL CAPSULES 0.25 MCG 0.25 MCG: 0.25 CAPSULE ORAL at 20:29

## 2022-11-27 RX ADMIN — PANTOPRAZOLE SODIUM 40 MG: 40 TABLET, DELAYED RELEASE ORAL at 06:13

## 2022-11-27 RX ADMIN — SODIUM CHLORIDE, PRESERVATIVE FREE 10 ML: 5 INJECTION INTRAVENOUS at 08:15

## 2022-11-27 RX ADMIN — ALOGLIPTIN 6.25 MG: 6.25 TABLET, FILM COATED ORAL at 08:12

## 2022-11-27 RX ADMIN — AMLODIPINE BESYLATE 10 MG: 10 TABLET ORAL at 08:13

## 2022-11-27 RX ADMIN — ARIPIPRAZOLE 5 MG: 5 TABLET ORAL at 20:29

## 2022-11-27 RX ADMIN — CALCIUM CARBONATE 1000 MG: 500 TABLET, CHEWABLE ORAL at 20:29

## 2022-11-27 RX ADMIN — SEVELAMER CARBONATE 800 MG: 800 TABLET, FILM COATED ORAL at 17:43

## 2022-11-27 RX ADMIN — HEPARIN SODIUM 5000 UNITS: 5000 INJECTION INTRAVENOUS; SUBCUTANEOUS at 06:13

## 2022-11-27 RX ADMIN — CALCIUM GLUCONATE 2000 MG: 20 INJECTION, SOLUTION INTRAVENOUS at 11:07

## 2022-11-27 RX ADMIN — CALCIUM CARBONATE 1000 MG: 500 TABLET, CHEWABLE ORAL at 17:43

## 2022-11-27 RX ADMIN — HEPARIN SODIUM 5000 UNITS: 5000 INJECTION INTRAVENOUS; SUBCUTANEOUS at 14:15

## 2022-11-27 RX ADMIN — SODIUM CHLORIDE, PRESERVATIVE FREE 10 ML: 5 INJECTION INTRAVENOUS at 20:30

## 2022-11-27 RX ADMIN — CARVEDILOL 12.5 MG: 6.25 TABLET, FILM COATED ORAL at 08:13

## 2022-11-27 RX ADMIN — OXYCODONE AND ACETAMINOPHEN 1 TABLET: 5; 325 TABLET ORAL at 08:17

## 2022-11-27 RX ADMIN — Medication 100 MEQ: at 10:52

## 2022-11-27 RX ADMIN — CALCITRIOL CAPSULES 0.25 MCG 0.25 MCG: 0.25 CAPSULE ORAL at 08:12

## 2022-11-27 RX ADMIN — OXYCODONE AND ACETAMINOPHEN 1 TABLET: 5; 325 TABLET ORAL at 17:45

## 2022-11-27 RX ADMIN — CALCIUM CARBONATE 1000 MG: 500 TABLET, CHEWABLE ORAL at 08:12

## 2022-11-27 RX ADMIN — RANOLAZINE 500 MG: 500 TABLET, FILM COATED, EXTENDED RELEASE ORAL at 20:29

## 2022-11-27 RX ADMIN — ONDANSETRON 4 MG: 4 TABLET, ORALLY DISINTEGRATING ORAL at 08:21

## 2022-11-27 RX ADMIN — RANOLAZINE 500 MG: 500 TABLET, FILM COATED, EXTENDED RELEASE ORAL at 08:12

## 2022-11-27 RX ADMIN — DEXTROSE MONOHYDRATE 250 ML: 10 INJECTION, SOLUTION INTRAVENOUS at 11:06

## 2022-11-27 RX ADMIN — HEPARIN SODIUM 5000 UNITS: 5000 INJECTION INTRAVENOUS; SUBCUTANEOUS at 20:30

## 2022-11-27 RX ADMIN — CARVEDILOL 12.5 MG: 6.25 TABLET, FILM COATED ORAL at 17:43

## 2022-11-27 RX ADMIN — SODIUM ZIRCONIUM CYCLOSILICATE 10 G: 10 POWDER, FOR SUSPENSION ORAL at 08:12

## 2022-11-27 RX ADMIN — SEVELAMER CARBONATE 800 MG: 800 TABLET, FILM COATED ORAL at 08:12

## 2022-11-27 RX ADMIN — INSULIN LISPRO 20 UNITS: 100 INJECTION, SOLUTION INTRAVENOUS; SUBCUTANEOUS at 08:22

## 2022-11-27 RX ADMIN — PANTOPRAZOLE SODIUM 40 MG: 40 TABLET, DELAYED RELEASE ORAL at 17:45

## 2022-11-27 RX ADMIN — SEVELAMER CARBONATE 800 MG: 800 TABLET, FILM COATED ORAL at 12:46

## 2022-11-27 RX ADMIN — ATORVASTATIN CALCIUM 40 MG: 40 TABLET, FILM COATED ORAL at 20:29

## 2022-11-27 ASSESSMENT — PAIN DESCRIPTION - LOCATION
LOCATION: FOOT
LOCATION: FOOT

## 2022-11-27 ASSESSMENT — ENCOUNTER SYMPTOMS
ABDOMINAL PAIN: 1
BACK PAIN: 1
ABDOMINAL DISTENTION: 1
COUGH: 0
SINUS PAIN: 0
NAUSEA: 0
EYE DISCHARGE: 0
EYE PAIN: 0
SINUS PRESSURE: 0
VOICE CHANGE: 0
SHORTNESS OF BREATH: 0
VOMITING: 0
CHEST TIGHTNESS: 0
BLOOD IN STOOL: 0
DIARRHEA: 0

## 2022-11-27 ASSESSMENT — PAIN SCALES - WONG BAKER
WONGBAKER_NUMERICALRESPONSE: 0
WONGBAKER_NUMERICALRESPONSE: 0

## 2022-11-27 ASSESSMENT — PAIN SCALES - GENERAL
PAINLEVEL_OUTOF10: 5
PAINLEVEL_OUTOF10: 2
PAINLEVEL_OUTOF10: 7
PAINLEVEL_OUTOF10: 3

## 2022-11-27 ASSESSMENT — PAIN DESCRIPTION - ORIENTATION
ORIENTATION: RIGHT;LEFT
ORIENTATION: RIGHT;LEFT

## 2022-11-27 ASSESSMENT — PAIN DESCRIPTION - DESCRIPTORS: DESCRIPTORS: GNAWING

## 2022-11-27 NOTE — PROGRESS NOTES
Nephrology Progress Note        [address], [address] [facility], [place] [address]  Phone: [phone]  Office Hours: 8:30AM - 4:30PM  Monday - Friday 11/27/2022 7:23 AM  Subjective:   Admit Date: 11/26/2022  PCP: Tyrone Alvarez MD  Interval History:   On room air  Reports diarrhea all night  BP wnl    Diet: ADULT DIET; Regular; 5 carb choices (75 gm/meal); Low Potassium (Less than 3000 mg/day); 1200 ml  Diet NPO      Data:   Scheduled Meds:   alogliptin  6.25 mg Oral Daily    amLODIPine  10 mg Oral Daily    ARIPiprazole  5 mg Oral Nightly    [Held by provider] aspirin  81 mg Oral Daily    atorvastatin  40 mg Oral Nightly    carvedilol  12.5 mg Oral BID WC    [Held by provider] clopidogrel  75 mg Oral Daily    insulin glargine  45 Units SubCUTAneous Nightly    insulin lispro  20 Units SubCUTAneous TID WC    insulin lispro  0-8 Units SubCUTAneous TID WC    insulin lispro  0-4 Units SubCUTAneous Nightly    pantoprazole  40 mg Oral BID AC    ranolazine  500 mg Oral BID    sevelamer  800 mg Oral TID WC    sodium chloride flush  5-40 mL IntraVENous 2 times per day    heparin (porcine)  5,000 Units SubCUTAneous 3 times per day    labetalol  10 mg IntraVENous Once    calcium carbonate  1,000 mg Oral 4x daily    calcitRIOL  0.25 mcg Oral BID     Continuous Infusions:   dextrose      sodium chloride       PRN Meds:glucose, dextrose bolus **OR** dextrose bolus, glucagon (rDNA), dextrose, traZODone, sodium chloride flush, sodium chloride, ondansetron **OR** ondansetron, polyethylene glycol, acetaminophen **OR** acetaminophen, oxyCODONE-acetaminophen, labetalol  No intake/output data recorded. No intake/output data recorded.   No intake or output data in the 24 hours ending 11/27/22 0723    CBC:   Recent Labs     11/26/22 0225 11/27/22 0626   WBC 8.8 7.9   HGB 9.9* 9.7*    201       BMP:    Recent Labs     11/26/22 0225   *   K 4.4   CL 93*   CO2 22   BUN 65*   CREATININE 7.4*   GLUCOSE 237* Hepatic: No results for input(s): AST, ALT, ALB, BILITOT, ALKPHOS in the last 72 hours. Troponin: No results for input(s): TROPONINI in the last 72 hours. BNP: No results for input(s): BNP in the last 72 hours. Lipids: No results for input(s): CHOL, HDL in the last 72 hours. Invalid input(s): LDLCALCU  ABGs:   Lab Results   Component Value Date/Time    PO2ART 119 06/18/2013 01:15 PM    HQU3HLF 47.0 06/18/2013 01:15 PM     INR: No results for input(s): INR in the last 72 hours. Objective:   Vitals: /74   Pulse 77   Temp 97.5 °F (36.4 °C) (Oral)   Resp 16   Ht 5' 9\" (1.753 m)   Wt 291 lb 0.2 oz (132 kg)   SpO2 96%   BMI 42.98 kg/m²   General appearance: alert and cooperative with exam, in no acute distress  HEENT: normocephalic, atraumatic,   Neck: supple, trachea midline  Lungs: breathing comfortably on room air  Abdomen: non distended,   Extremities: extremities atraumatic, no cyanosis or edema  Neurologic: alert, oriented, follows commands, interactive    MEDICAL DECISION MAKING     -HD cath accidental removal  -HTN  -Hypocalcemia   -CAD s/p coronary stents that are less than 1y ear old  -ESRD on HD MWF  -HTN  -DM2  -LUE AVF not mature yet  -Diabetic foot ulcer hx    Suggest:  -NPO at midnight so he can get the tunnelled HD cath tomorrow   -HD tomorrow too  -Having diarrhea, ma consider imodium.   He denies recent antibiotics use                  Electronically signed by Lena Henderson DO on 11/27/2022 at [facility], DO [facility],  [place]  [facility], [place] [address]  PHONE: [phone]  FAX: [phone]

## 2022-11-27 NOTE — PROGRESS NOTES
Cardiology Progress Note     Admit Date:  11/26/2022    Consult reason/ Seen today for :       Subjective and  Overnight Events : No new complaints plan for IR to place dialysis catheter in a.m. DisCussed with IR proceed with dialysis catheter placement without holding antiplatelet    Chief complain on admission : 52 y. o.year old who is admitted for  Chief Complaint   Patient presents with    Vascular Access Problem     Catheter came out, R upper chest      Assessment / Plan:  S/p PCI in September 2022 risk versus benefits do not favor holding antiplatelet at this time less absolutely needed have discussed with IR , they have agreed to proceed with tunneled catheter placement on dual antiplatelet therapy  ESRD continue dialysis as per nephrology  History of diabetic foot ulcer with normal arterial Doppler continue wound management  Hypertension continue amlodipine and carvedilol 12.5 mg twice daily  Diabetes as per primary team  DVT prophylaxis if no contraindication  6.    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 Past surgical history:   has a past surgical history that includes Cardiac catheterization (2003, 2013); Tonsillectomy and adenoidectomy (1988); Upper gastrointestinal endoscopy (06/02/2011); Colonoscopy (06/02/2011); Nose surgery (1993); skin biopsy (N/A, 06/18/2013); other surgical history (Right, 05/22/2015); Toe amputation; Abdomen surgery; other surgical history (12/04/2017); pr deep incis foot bone infectn (Left, 09/22/2018); Upper gastrointestinal endoscopy (N/A, 01/12/2019); Toe amputation (Right, 03/23/2019); Toe amputation (Right, 08/06/2019); Scrotal surgery (N/A, 05/15/2021); Scrotal surgery (N/A, 05/16/2021); Foot surgery (Left, 12/21/2021); Upper gastrointestinal endoscopy (N/A, 07/26/2022); IR TUNNELED CVC PLACE WO SQ PORT/PUMP > 5 YEARS (07/27/2022); IR NONTUNNELED VASCULAR CATHETER > 5 YEARS (07/25/2022); and Coronary stent placement. Social History:   reports that he quit smoking about 4 months ago. His smoking use included cigarettes. He has never used smokeless tobacco. He reports that he does not currently use alcohol. He reports current drug use. Frequency: 7.00 times per week. Drug: Marijuana [employer]). Family history:  family history includes ADHD in his daughter; Bleeding Prob in his mother; Cancer in his mother; Diabetes in his father and sister; Heart Disease in his father; High Blood Pressure in his father, sister, and another family member; Kidney Disease in his father; Mental Illness in his sister and another family member; Obesity in his father and sister; Other in his son; Stroke in his mother.     Allergies   Allergen Reactions    Adhesive Tape Rash    Doxycycline Nausea And Vomiting    Reglan [Metoclopramide] Anxiety       Review of Systems:    All 14 systems were reviewed and are negative  Except for the positive findings  which as documented /72   Pulse 74   Temp 97.6 °F (36.4 °C) (Oral)   Resp 18   Ht 5' 9\" (1.753 m)   Wt 291 lb 0.2 oz (132 kg)   SpO2 97%   BMI 42.98 kg/m²   No intake or output data in the 24 hours ending 11/27/22 1611  Physical Exam:  Constitutional:  Well developed, Well nourished, No acute distress, Non-toxic appearance. HENT:  Normocephalic, Atraumatic, Bilateral external ears normal, Oropharynx moist, No oral exudates, Nose normal. Neck- Normal range of motion, No tenderness, Supple, No stridor. Eyes:  PERRL, EOMI, Conjunctiva normal, No discharge. Respiratory:  Normal breath sounds, No respiratory distress, No wheezing, No chest tenderness. Cardiovascular:  Normal heart rate, Normal rhythm, No murmurs, No rubs, No gallops, JVP not elevated  Abdomen/GI:  Bowel sounds normal, Soft, No tenderness, No masses, No pulsatile masses. Musculoskeletal:  Intact distal pulses, No edema, No tenderness, No cyanosis, No clubbing. Good range of motion in all major joints. No tenderness to palpation or major deformities noted. Back- No tenderness. Integument:  Warm, Dry, No erythema, No rash. Lymphatic:  No lymphadenopathy noted. Neurologic:  Alert & oriented x 3, Normal motor function, Normal sensory function, No focal deficits noted.    Psychiatric:  Affect  and  Mood :no change    Medications:    sodium zirconium cyclosilicate  10 g Oral TID    insulin regular  10 Units IntraVENous Once    alogliptin  6.25 mg Oral Daily    amLODIPine  10 mg Oral Daily    ARIPiprazole  5 mg Oral Nightly    [Held by provider] aspirin  81 mg Oral Daily    atorvastatin  40 mg Oral Nightly    carvedilol  12.5 mg Oral BID WC    [Held by provider] clopidogrel  75 mg Oral Daily    insulin glargine  45 Units SubCUTAneous Nightly    insulin lispro  20 Units SubCUTAneous TID     insulin lispro  0-8 Units SubCUTAneous TID     insulin lispro  0-4 Units SubCUTAneous Nightly    pantoprazole  40 mg Oral BID AC    ranolazine  500 mg Oral BID    sevelamer  800 mg Oral TID WC    sodium chloride flush  5-40 mL IntraVENous 2 times per day    heparin (porcine)  5,000 Units SubCUTAneous 3 times per day    calcium carbonate  1,000 mg Oral 4x daily    calcitRIOL  0.25 mcg Oral BID      dextrose      dextrose      sodium chloride       glucose, dextrose bolus **OR** dextrose bolus, glucagon (rDNA), dextrose, dextrose bolus **OR** dextrose bolus, dextrose, traZODone, sodium chloride flush, sodium chloride, ondansetron **OR** ondansetron, polyethylene glycol, acetaminophen **OR** acetaminophen, oxyCODONE-acetaminophen, labetalol    Lab Data:  CBC:   Recent Labs     11/26/22 0225 11/27/22  0626   WBC 8.8 7.9   HGB 9.9* 9.7*   HCT 31.2* 30.8*   MCV 92.0 92.8    201     BMP:   Recent Labs     11/26/22 0225 11/27/22  0626   * 132*   K 4.4 6.0*   CL 93* 97*   CO2 22 18*   BUN 65* 87*   CREATININE 7.4* 9.3*     PT/INR: No results for input(s): PROTIME, INR in the last 72 hours. BNP:  No results for input(s): PROBNP in the last 72 hours. TROPONIN: No results for input(s): TROPONINT in the last 72 hours. ECHO :   echocardiogram    Assessment:  52 y. o.year old who is admitted for  Chief Complaint   Patient presents with    Vascular Access Problem     Catheter came out, R upper chest    , active issues as noted below:  Impression:  Principal Problem:    Problem with vascular access  Resolved Problems:    * No resolved hospital problems. *            All labs, medications and tests reviewed by myself , continue all other medications of all above medical condition listed as is except for changes mentioned above. Thank you very much for consult , please call with questions.     Luis Gilbert MD, MD 11/27/2022 4:11 PM

## 2022-11-27 NOTE — PROGRESS NOTES
Received new order for Humalin r, Humalin n 10 units, PS hospitalist informing patient already had a.m dose of humalog 20 units with breakfast, per  Dr , hold humalin order for now.

## 2022-11-27 NOTE — PROGRESS NOTES
V2.0  [facility] Hospitalist Progress Note      Name:  Ruth Steward /Age/Sex: 1975  (52 y.o. male)   MRN & CSN:  [id number] & [id number] Encounter Date/Time: 2022 8:01 AM EST    Location:  [facility]/6989-N PCP: Edson Lucero MD       Hospital Day: 2    Assessment and Plan:   Ruth Steward is a 52 y.o. male with a pmh of SRD on HD, Type II DM, HTN, and anxiety  who presented after his hemodialysis line was accidentally pulled out. Plan:  End-stage renal disease on hemodialysis over the last hemodialysis access:  IR got consulted. Plan for catheter placement again on Monday. Plavix and aspirin on hold. Cardiology on board. Nephrology consulted will appreciate recommendations. Avoid nephrotoxic agents. On Bicarb tablets. Hyperkalemia in the setting of acute renal failure: Needs hemodialysis. Started on [facility]. Will give GKI regimen. Continue telemetry. Monitor BMP twice daily  Non-insulin-dependent diabetes mellitus type 2 on sliding scale insulin  Hypertensive urgency improved on as needed labetalol and home medications  Anxiety on home anxiolytics  Hyperlipidemia: On statin  Stable CAD: On ranolazine    Diet ADULT DIET; Regular; 5 carb choices (75 gm/meal); Low Potassium (Less than 3000 mg/day); 1200 ml  Diet NPO   DVT Prophylaxis [] Lovenox, [x]  Heparin, [] SCDs, [] Ambulation,  [] Eliquis, [] Xarelto  [] Coumadin   Code Status Full Code   Disposition From: Home  Expected Disposition: Home  Estimated Date of Discharge: 2 to 3 days  Patient requires continued admission due to needs hyperkalemia and renal failure management. N.p.o. over midnight for IR guided catheter placement   Surrogate Decision Maker/ POA      Subjective:     Chief Complaint: Vascular Access Problem (Catheter came out, R upper chest)     The patient was seen at bedside. Weak. Sleepy. Mildly confused. Discussed plan in detail.      Review of Systems:    Review of Systems   Constitutional:  Positive for activity change, appetite change and unexpected weight change. Negative for chills and fever. HENT:  Negative for ear pain, hearing loss, sinus pressure, sinus pain and voice change. Eyes:  Negative for pain, discharge and visual disturbance. Respiratory:  Negative for cough, chest tightness and shortness of breath. Cardiovascular:  Positive for leg swelling. Negative for chest pain and palpitations. Gastrointestinal:  Positive for abdominal distention and abdominal pain. Negative for blood in stool, diarrhea, nausea and vomiting. Genitourinary:  Positive for decreased urine volume. Negative for dysuria and frequency. Musculoskeletal:  Positive for arthralgias and back pain. Neurological:  Positive for weakness. Negative for dizziness, light-headedness and headaches. Psychiatric/Behavioral:  Negative for sleep disturbance. Objective:   No intake or output data in the 24 hours ending 11/27/22 0801     Vitals:   Vitals:    11/27/22 0333   BP: 118/74   Pulse: 77   Resp: 16   Temp: 97.5 °F (36.4 °C)   SpO2: 96%       Physical Exam:   Physical Exam  Vitals and nursing note reviewed. Constitutional:       Appearance: Normal appearance. He is normal weight. HENT:      Head: Normocephalic. Right Ear: Tympanic membrane normal.      Left Ear: Tympanic membrane normal.      Nose: Nose normal.      Mouth/Throat:      Mouth: Mucous membranes are moist.      Pharynx: Oropharynx is clear. Eyes:      Conjunctiva/sclera: Conjunctivae normal.      Pupils: Pupils are equal, round, and reactive to light. Cardiovascular:      Rate and Rhythm: Normal rate and regular rhythm. Pulses: Normal pulses. Heart sounds: Normal heart sounds. No murmur heard. Pulmonary:      Effort: Pulmonary effort is normal.      Breath sounds: Normal breath sounds. No wheezing, rhonchi or rales. Abdominal:      General: Abdomen is flat. Bowel sounds are normal. There is distension. Palpations: Abdomen is soft. Tenderness: There is no abdominal tenderness. Musculoskeletal:         General: Signs of injury present. No deformity. Normal range of motion. Cervical back: Normal range of motion and neck supple. Right lower leg: Edema present. Left lower leg: Edema present. Skin:     General: Skin is warm and dry. Coloration: Skin is not jaundiced or pale. Neurological:      General: No focal deficit present. Mental Status: He is alert and oriented to person, place, and time. Mental status is at baseline.           Medications:   Medications:    sodium zirconium cyclosilicate  10 g Oral TID    alogliptin  6.25 mg Oral Daily    amLODIPine  10 mg Oral Daily    ARIPiprazole  5 mg Oral Nightly    [Held by provider] aspirin  81 mg Oral Daily    atorvastatin  40 mg Oral Nightly    carvedilol  12.5 mg Oral BID WC    [Held by provider] clopidogrel  75 mg Oral Daily    insulin glargine  45 Units SubCUTAneous Nightly    insulin lispro  20 Units SubCUTAneous TID WC    insulin lispro  0-8 Units SubCUTAneous TID WC    insulin lispro  0-4 Units SubCUTAneous Nightly    pantoprazole  40 mg Oral BID AC    ranolazine  500 mg Oral BID    sevelamer  800 mg Oral TID WC    sodium chloride flush  5-40 mL IntraVENous 2 times per day    heparin (porcine)  5,000 Units SubCUTAneous 3 times per day    labetalol  10 mg IntraVENous Once    calcium carbonate  1,000 mg Oral 4x daily    calcitRIOL  0.25 mcg Oral BID      Infusions:    dextrose      sodium chloride       PRN Meds: glucose, 4 tablet, PRN  dextrose bolus, 125 mL, PRN   Or  dextrose bolus, 250 mL, PRN  glucagon (rDNA), 1 mg, PRN  dextrose, , Continuous PRN  traZODone, 50 mg, Nightly PRN  sodium chloride flush, 5-40 mL, PRN  sodium chloride, , PRN  ondansetron, 4 mg, Q8H PRN   Or  ondansetron, 4 mg, Q6H PRN  polyethylene glycol, 17 g, Daily PRN  acetaminophen, 650 mg, Q6H PRN   Or  acetaminophen, 650 mg, Q6H PRN  oxyCODONE-acetaminophen, 1 tablet, Q8H PRN  labetalol, 10 mg, Q4H PRN        Labs      Recent Results (from the past 24 hour(s))   POCT Glucose    Collection Time: 11/26/22  8:02 AM   Result Value Ref Range    POC Glucose 217 (H) 70 - 99 MG/DL   POCT Glucose    Collection Time: 11/26/22 11:57 AM   Result Value Ref Range    POC Glucose 210 (H) 70 - 99 MG/DL   POCT Glucose    Collection Time: 11/26/22  5:17 PM   Result Value Ref Range    POC Glucose 103 (H) 70 - 99 MG/DL   POCT Glucose    Collection Time: 11/26/22  8:03 PM   Result Value Ref Range    POC Glucose 178 (H) 70 - 99 MG/DL   Basic Metabolic Panel w/ Reflex to MG    Collection Time: 11/27/22  6:26 AM   Result Value Ref Range    Sodium 132 (L) 135 - 145 MMOL/L    Potassium 6.0 (HH) 3.5 - 5.1 MMOL/L    Chloride 97 (L) 99 - 110 mMol/L    CO2 18 (L) 21 - 32 MMOL/L    Anion Gap 17 (H) 4 - 16    BUN 87 (H) 6 - 23 MG/DL    Creatinine 9.3 (H) 0.9 - 1.3 MG/DL    Est, Glom Filt Rate 6 (L) >60 mL/min/1.73m2    Glucose 192 (H) 70 - 99 MG/DL    Calcium 7.1 (L) 8.3 - 10.6 MG/DL   CBC with Auto Differential    Collection Time: 11/27/22  6:26 AM   Result Value Ref Range    WBC 7.9 4.0 - 10.5 K/CU MM    RBC 3.32 (L) 4.6 - 6.2 M/CU MM    Hemoglobin 9.7 (L) 13.5 - 18.0 GM/DL    Hematocrit 30.8 (L) 42 - 52 %    MCV 92.8 78 - 100 FL    MCH 29.2 27 - 31 PG    MCHC 31.5 (L) 32.0 - 36.0 %    RDW 14.7 11.7 - 14.9 %    Platelets 292 196 - 176 K/CU MM    MPV 10.4 7.5 - 11.1 FL    Differential Type AUTOMATED DIFFERENTIAL     Segs Relative 78.1 (H) 36 - 66 %    Lymphocytes % 7.9 (L) 24 - 44 %    Monocytes % 10.6 (H) 0 - 4 %    Eosinophils % 2.4 0 - 3 %    Basophils % 0.5 0 - 1 %    Segs Absolute 6.2 K/CU MM    Lymphocytes Absolute 0.6 K/CU MM    Monocytes Absolute 0.8 K/CU MM    Eosinophils Absolute 0.2 K/CU MM    Basophils Absolute 0.0 K/CU MM    Nucleated RBC % 0.0 %    Total Nucleated RBC 0.0 K/CU MM    Total Immature Neutrophil 0.04 K/CU MM    Immature Neutrophil % 0.5 (H) 0 - 0.43 %        Imaging/Diagnostics Last 24 Hours   XR CHEST PORTABLE    Result Date: 11/27/2022  EXAMINATION: ONE X-RAY VIEW OF THE CHEST 11/26/2022 2:25 am COMPARISON: November 13, 2022 HISTORY: ORDERING SYSTEM PROVIDED HISTORY:  Dialysis catheter removed TECHNOLOGIST PROVIDED HISTORY: Reason for Exam:  Dialysis catheter removed FINDINGS: No lines or tubes. Normal cardiomediastinal silhouette. The lungs are clear without focal consolidation or pleural effusion. No suspicious pulmonary nodules. No pulmonary edema. No pneumothorax. No acute osseous abnormality. Interval removal of the right internal jugular dialysis catheter. No acute cardiopulmonary disease.        Electronically signed by Tony Salamanca MD, MD on 11/27/2022 at 8:01 AM

## 2022-11-28 ENCOUNTER — APPOINTMENT (OUTPATIENT)
Dept: INTERVENTIONAL RADIOLOGY/VASCULAR | Age: 47
End: 2022-11-28
Payer: MEDICARE

## 2022-11-28 LAB
ANION GAP SERPL CALCULATED.3IONS-SCNC: 20 MMOL/L (ref 4–16)
APTT: 33.1 SECONDS (ref 25.1–37.1)
BASOPHILS ABSOLUTE: 0 K/CU MM
BASOPHILS RELATIVE PERCENT: 0.4 % (ref 0–1)
BUN BLDV-MCNC: 89 MG/DL (ref 6–23)
CALCIUM SERPL-MCNC: 6.9 MG/DL (ref 8.3–10.6)
CHLORIDE BLD-SCNC: 99 MMOL/L (ref 99–110)
CO2: 18 MMOL/L (ref 21–32)
CREAT SERPL-MCNC: 10 MG/DL (ref 0.9–1.3)
DIFFERENTIAL TYPE: ABNORMAL
EOSINOPHILS ABSOLUTE: 0.1 K/CU MM
EOSINOPHILS RELATIVE PERCENT: 1.6 % (ref 0–3)
GFR SERPL CREATININE-BSD FRML MDRD: 6 ML/MIN/1.73M2
GLUCOSE BLD-MCNC: 134 MG/DL (ref 70–99)
GLUCOSE BLD-MCNC: 140 MG/DL (ref 70–99)
GLUCOSE BLD-MCNC: 154 MG/DL (ref 70–99)
GLUCOSE BLD-MCNC: 158 MG/DL (ref 70–99)
HCT VFR BLD CALC: 32.3 % (ref 42–52)
HEMOGLOBIN: 9.5 GM/DL (ref 13.5–18)
IMMATURE NEUTROPHIL %: 0.5 % (ref 0–0.43)
INR BLD: 0.91 INDEX
LYMPHOCYTES ABSOLUTE: 0.9 K/CU MM
LYMPHOCYTES RELATIVE PERCENT: 10.9 % (ref 24–44)
MCH RBC QN AUTO: 29.3 PG (ref 27–31)
MCHC RBC AUTO-ENTMCNC: 29.4 % (ref 32–36)
MCV RBC AUTO: 99.7 FL (ref 78–100)
MONOCYTES ABSOLUTE: 0.7 K/CU MM
MONOCYTES RELATIVE PERCENT: 8.3 % (ref 0–4)
NUCLEATED RBC %: 0 %
PDW BLD-RTO: 14.5 % (ref 11.7–14.9)
PLATELET # BLD: 110 K/CU MM (ref 140–440)
PMV BLD AUTO: 10.2 FL (ref 7.5–11.1)
POTASSIUM SERPL-SCNC: 5.2 MMOL/L (ref 3.5–5.1)
PROTHROMBIN TIME: 11.7 SECONDS (ref 11.7–14.5)
RBC # BLD: 3.24 M/CU MM (ref 4.6–6.2)
SEGMENTED NEUTROPHILS ABSOLUTE COUNT: 6.2 K/CU MM
SEGMENTED NEUTROPHILS RELATIVE PERCENT: 78.3 % (ref 36–66)
SODIUM BLD-SCNC: 137 MMOL/L (ref 135–145)
TOTAL IMMATURE NEUTOROPHIL: 0.04 K/CU MM
TOTAL NUCLEATED RBC: 0 K/CU MM
WBC # BLD: 8 K/CU MM (ref 4–10.5)

## 2022-11-28 PROCEDURE — 6360000002 HC RX W HCPCS

## 2022-11-28 PROCEDURE — G0378 HOSPITAL OBSERVATION PER HR: HCPCS

## 2022-11-28 PROCEDURE — 85610 PROTHROMBIN TIME: CPT

## 2022-11-28 PROCEDURE — 94761 N-INVAS EAR/PLS OXIMETRY MLT: CPT

## 2022-11-28 PROCEDURE — 76937 US GUIDE VASCULAR ACCESS: CPT

## 2022-11-28 PROCEDURE — 6360000002 HC RX W HCPCS: Performed by: STUDENT IN AN ORGANIZED HEALTH CARE EDUCATION/TRAINING PROGRAM

## 2022-11-28 PROCEDURE — 6360000002 HC RX W HCPCS: Performed by: RADIOLOGY

## 2022-11-28 PROCEDURE — 6370000000 HC RX 637 (ALT 250 FOR IP): Performed by: STUDENT IN AN ORGANIZED HEALTH CARE EDUCATION/TRAINING PROGRAM

## 2022-11-28 PROCEDURE — 90935 HEMODIALYSIS ONE EVALUATION: CPT

## 2022-11-28 PROCEDURE — 36415 COLL VENOUS BLD VENIPUNCTURE: CPT

## 2022-11-28 PROCEDURE — 82962 GLUCOSE BLOOD TEST: CPT

## 2022-11-28 PROCEDURE — 85730 THROMBOPLASTIN TIME PARTIAL: CPT

## 2022-11-28 PROCEDURE — 80048 BASIC METABOLIC PNL TOTAL CA: CPT

## 2022-11-28 PROCEDURE — 6370000000 HC RX 637 (ALT 250 FOR IP): Performed by: INTERNAL MEDICINE

## 2022-11-28 PROCEDURE — 85025 COMPLETE CBC W/AUTO DIFF WBC: CPT

## 2022-11-28 PROCEDURE — 6360000002 HC RX W HCPCS: Performed by: NURSE PRACTITIONER

## 2022-11-28 PROCEDURE — 2580000003 HC RX 258: Performed by: RADIOLOGY

## 2022-11-28 PROCEDURE — 2700000000 HC OXYGEN THERAPY PER DAY

## 2022-11-28 PROCEDURE — 77001 FLUOROGUIDE FOR VEIN DEVICE: CPT

## 2022-11-28 PROCEDURE — 2580000003 HC RX 258

## 2022-11-28 PROCEDURE — 36558 INSERT TUNNELED CV CATH: CPT

## 2022-11-28 PROCEDURE — 2580000003 HC RX 258: Performed by: STUDENT IN AN ORGANIZED HEALTH CARE EDUCATION/TRAINING PROGRAM

## 2022-11-28 PROCEDURE — C1750 CATH, HEMODIALYSIS,LONG-TERM: HCPCS

## 2022-11-28 RX ORDER — DIPHENHYDRAMINE HYDROCHLORIDE 50 MG/ML
INJECTION INTRAMUSCULAR; INTRAVENOUS
Status: COMPLETED | OUTPATIENT
Start: 2022-11-28 | End: 2022-11-28

## 2022-11-28 RX ORDER — MORPHINE SULFATE 2 MG/ML
1 INJECTION, SOLUTION INTRAMUSCULAR; INTRAVENOUS ONCE
Status: COMPLETED | OUTPATIENT
Start: 2022-11-28 | End: 2022-11-28

## 2022-11-28 RX ORDER — CALCIUM CARBONATE 200(500)MG
1000 TABLET,CHEWABLE ORAL
Status: DISCONTINUED | OUTPATIENT
Start: 2022-11-28 | End: 2022-11-29 | Stop reason: HOSPADM

## 2022-11-28 RX ORDER — CALCIUM GLUCONATE 20 MG/ML
2000 INJECTION, SOLUTION INTRAVENOUS ONCE
Status: COMPLETED | OUTPATIENT
Start: 2022-11-28 | End: 2022-11-28

## 2022-11-28 RX ORDER — CALCITRIOL 0.25 UG/1
0.5 CAPSULE, LIQUID FILLED ORAL 2 TIMES DAILY
Status: DISCONTINUED | OUTPATIENT
Start: 2022-11-28 | End: 2022-11-29 | Stop reason: HOSPADM

## 2022-11-28 RX ORDER — CEFAZOLIN SODIUM 1 G/50ML
1000 INJECTION, SOLUTION INTRAVENOUS ONCE
Status: DISCONTINUED | OUTPATIENT
Start: 2022-11-28 | End: 2022-11-28 | Stop reason: RX

## 2022-11-28 RX ADMIN — PANTOPRAZOLE SODIUM 40 MG: 40 TABLET, DELAYED RELEASE ORAL at 18:34

## 2022-11-28 RX ADMIN — MORPHINE SULFATE 1 MG: 2 INJECTION, SOLUTION INTRAMUSCULAR; INTRAVENOUS at 12:58

## 2022-11-28 RX ADMIN — CEFAZOLIN SODIUM 1000 MG: 1 INJECTION, POWDER, FOR SOLUTION INTRAMUSCULAR; INTRAVENOUS at 21:18

## 2022-11-28 RX ADMIN — SODIUM CHLORIDE, PRESERVATIVE FREE 10 ML: 5 INJECTION INTRAVENOUS at 21:28

## 2022-11-28 RX ADMIN — OXYCODONE AND ACETAMINOPHEN 1 TABLET: 5; 325 TABLET ORAL at 18:34

## 2022-11-28 RX ADMIN — CALCIUM GLUCONATE 2000 MG: 20 INJECTION, SOLUTION INTRAVENOUS at 06:19

## 2022-11-28 RX ADMIN — CARVEDILOL 12.5 MG: 6.25 TABLET, FILM COATED ORAL at 18:34

## 2022-11-28 RX ADMIN — INSULIN GLARGINE 45 UNITS: 100 INJECTION, SOLUTION SUBCUTANEOUS at 21:23

## 2022-11-28 RX ADMIN — CALCIUM CARBONATE 1000 MG: 500 TABLET, CHEWABLE ORAL at 18:34

## 2022-11-28 RX ADMIN — SEVELAMER CARBONATE 800 MG: 800 TABLET, FILM COATED ORAL at 18:34

## 2022-11-28 RX ADMIN — SODIUM CHLORIDE: 9 INJECTION, SOLUTION INTRAVENOUS at 21:11

## 2022-11-28 RX ADMIN — ATORVASTATIN CALCIUM 40 MG: 40 TABLET, FILM COATED ORAL at 21:20

## 2022-11-28 RX ADMIN — CALCITRIOL CAPSULES 0.25 MCG 0.5 MCG: 0.25 CAPSULE ORAL at 21:20

## 2022-11-28 RX ADMIN — RANOLAZINE 500 MG: 500 TABLET, FILM COATED, EXTENDED RELEASE ORAL at 21:20

## 2022-11-28 RX ADMIN — ARIPIPRAZOLE 5 MG: 5 TABLET ORAL at 21:20

## 2022-11-28 RX ADMIN — HEPARIN SODIUM 5000 UNITS: 5000 INJECTION INTRAVENOUS; SUBCUTANEOUS at 21:20

## 2022-11-28 RX ADMIN — DIPHENHYDRAMINE HYDROCHLORIDE 25 MG: 50 INJECTION, SOLUTION INTRAMUSCULAR; INTRAVENOUS at 14:07

## 2022-11-28 ASSESSMENT — PAIN DESCRIPTION - LOCATION
LOCATION: FOOT;OTHER (COMMENT)
LOCATION: FOOT;LEG

## 2022-11-28 ASSESSMENT — PAIN DESCRIPTION - ORIENTATION
ORIENTATION: RIGHT;LEFT
ORIENTATION: RIGHT;LEFT

## 2022-11-28 ASSESSMENT — PAIN SCALES - GENERAL
PAINLEVEL_OUTOF10: 8
PAINLEVEL_OUTOF10: 8

## 2022-11-28 ASSESSMENT — ENCOUNTER SYMPTOMS
SINUS PAIN: 0
VOMITING: 0
SHORTNESS OF BREATH: 0
DIARRHEA: 0
VOICE CHANGE: 0
EYE DISCHARGE: 0
COUGH: 0
EYE PAIN: 0
CHEST TIGHTNESS: 0
NAUSEA: 0
BACK PAIN: 1
ABDOMINAL PAIN: 1
ABDOMINAL DISTENTION: 1
BLOOD IN STOOL: 0
SINUS PRESSURE: 0

## 2022-11-28 ASSESSMENT — PAIN DESCRIPTION - DESCRIPTORS
DESCRIPTORS: STABBING;THROBBING
DESCRIPTORS: STABBING;NAGGING

## 2022-11-28 NOTE — OR NURSING
PROCEDURE PERFORMED: RIJ tunneled dialysis catheter placement     PRIMARY INDICATION FOR PROCEDURE: chronic hemodialysis    INFORMED CONSENT:  Obtained prior to procedure. Consent placed in chart. MADDY SCORE PRE PROCEDURE: 9       PT TRANSPORTED FROM: 4128                                   TO THE IR ROOM:  Large                      ASSESSMENT: Pt alert & oriented on 2LNC. Has BUI & difficulty laying flat. O2 increased to [place] for procedure. Able to move all extremities freely. BARRIER PRECAUTIONS & STERILE TECHNIQUE:               Pt transferred to the table and positioned supine for comfort. Warm blankets given. Pt placed on Cardiac Monitor. Pt prepped and draped in a sterile fashion with chlorhexadine. PAIN/LOCAL ANESTHESIA/SEDATION MANAGEMENT:           Local: Lidocaine 1% given by Dr. Cele Martinez:           ACCESS TIME: 1408          US/FLUORO: US 3 images  FT: 1.0  AK: 92          WIRE USED: 0.035\" stiffglide 80cm          ACCESS USED: micropuncture          CATHETER USED: 14.5Fr x 19cm double lumen chronic hemodialysis catheter sutured in place & heparin locked         STERILE DRESSINGS: skin glue, bandaid, biopatch & tegaderm    SPECIMENS: None    EBL:  <1cc        FOLLOW- UP X-RAY: None    COMPLICATIONS/ OUTCOME: VSS, pt tolerated well. Catheter is ok to use.      MADDY SCORE POST PROCEDURE:  9        REPORT CALLED TO: Shruti Mckinley in Dialysis

## 2022-11-28 NOTE — PROGRESS NOTES
Nephrology Progress Note        [address], [address] [address]  Phone: [phone]  Office Hours: 8:30AM - 4:30PM  Monday - Friday 11/28/2022 8:07 AM  Subjective:   Admit Date: 11/26/2022  PCP: Salvador Jiménez MD  Interval History: reports no more diarrhea    Diet: Diet NPO      Data:   Scheduled Meds:   calcium gluconate  2,000 mg IntraVENous Once    calcitRIOL  0.5 mcg Oral BID    calcium carbonate  1,000 mg Oral TID WC    sodium zirconium cyclosilicate  10 g Oral TID    alogliptin  6.25 mg Oral Daily    amLODIPine  10 mg Oral Daily    ARIPiprazole  5 mg Oral Nightly    aspirin  81 mg Oral Daily    atorvastatin  40 mg Oral Nightly    carvedilol  12.5 mg Oral BID WC    clopidogrel  75 mg Oral Daily    insulin glargine  45 Units SubCUTAneous Nightly    insulin lispro  20 Units SubCUTAneous TID WC    insulin lispro  0-8 Units SubCUTAneous TID WC    insulin lispro  0-4 Units SubCUTAneous Nightly    pantoprazole  40 mg Oral BID AC    ranolazine  500 mg Oral BID    sevelamer  800 mg Oral TID WC    sodium chloride flush  5-40 mL IntraVENous 2 times per day    heparin (porcine)  5,000 Units SubCUTAneous 3 times per day     Continuous Infusions:   dextrose      dextrose      sodium chloride       PRN Meds:glucose, dextrose bolus **OR** dextrose bolus, glucagon (rDNA), dextrose, dextrose bolus **OR** dextrose bolus, dextrose, traZODone, sodium chloride flush, sodium chloride, ondansetron **OR** ondansetron, polyethylene glycol, acetaminophen **OR** acetaminophen, oxyCODONE-acetaminophen, labetalol  No intake/output data recorded. No intake/output data recorded.   No intake or output data in the 24 hours ending 11/28/22 0807    CBC:   Recent Labs     11/26/22 0225 11/27/22 0626 11/28/22  0415   WBC 8.8 7.9 8.0   HGB 9.9* 9.7* 9.5*    201 110*       BMP:    Recent Labs     11/26/22 0225 11/27/22 0626 11/28/22 0415   * 132* 137   K 4.4 6.0* 5.2*   CL 93* 97* 99   CO2 22 18* 18*   BUN 65* 87* 89*   CREATININE 7.4* 9.3* 10.0*   GLUCOSE 237* 192* 154*     Hepatic: No results for input(s): AST, ALT, ALB, BILITOT, ALKPHOS in the last 72 hours. Troponin: No results for input(s): TROPONINI in the last 72 hours. BNP: No results for input(s): BNP in the last 72 hours. Lipids: No results for input(s): CHOL, HDL in the last 72 hours. Invalid input(s): LDLCALCU  ABGs:   Lab Results   Component Value Date/Time    PO2ART 119 06/18/2013 01:15 PM    HWS1VVH 47.0 06/18/2013 01:15 PM     INR: No results for input(s): INR in the last 72 hours.     Objective:   Vitals: /77   Pulse 80   Temp 97.3 °F (36.3 °C) (Oral)   Resp 18   Ht 5' 9\" (1.753 m)   Wt 293 lb 11.2 oz (133.2 kg)   SpO2 93%   BMI 43.37 kg/m²   General appearance: alert and cooperative with exam, in no acute distress  HEENT: normocephalic, atraumatic,   Neck: supple, trachea midline  Lungs: breathing comfortably on room air  Abdomen: soft, non-tender; non distended,   Extremities: extremities atraumatic, no cyanosis or edema  Neurologic: alert, oriented, follows commands, interactive    MEDICAL DECISION MAKING     Patient Active Problem List    Diagnosis Date Noted    Problem with vascular access 11/26/2022    ESRD (end stage renal disease) ([facility] 09/12/2022    WD - Ulcer of forefoot due to type 2 diabetes mellitus ([facility] 09/01/2022    NSTEMI (non-ST elevated myocardial infarction) ([facility] [address] 08/24/2022    Chest tightness 08/22/2022    CARMEN (acute kidney injury) ([facility] [address] 07/25/2022    Anemia 07/25/2022    Recurrent major depressive disorder, in full remission ([facility] [facility] 05/18/2021    Generalized anxiety disorder 05/18/2021    Surgical wound dehiscence 02/08/2022    Chest pain 12/21/2021    Other proteinuria     FH: CAD (coronary artery disease) 09/29/2021    ASCVD (arteriosclerotic cardiovascular disease) 09/29/2021    Chronic kidney disease, stage IV (severe) (HCC) 09/28/2021    Nephrotic syndrome 09/22/2021    Generalized edema 09/22/2021    Stage 3b chronic kidney disease ([address] 09/22/2021    Diabetic nephropathy associated with type 2 diabetes mellitus ([address] 09/22/2021    Hypoalbuminemia 09/22/2021    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 type 2 diabetes mellitus, with muscle involvement without evidence of necrosis ([address] 09/21/2021    Right groin wound 06/09/2021    Ulcer of right groin with fat layer exposed ([address]     Syncope 06/05/2021    Necrotizing fasciitis ([address] 05/24/2021    Morbid obesity with body mass index (BMI) of 40.0 to 44.9 in [facility]) 05/21/2021    Irene gangrene     Cellulitis, scrotum 05/13/2021    Acute epididymitis     Cellulitis of left arm 04/03/2021    Cellulitis 03/23/2021    Acute renal failure (ARF) ([address] 08/04/2019    Diabetic foot infection ([address] 03/21/2019    Intractable nausea and vomiting 01/11/2019    Uncontrolled type 2 diabetes mellitus 01/11/2019    Nausea and vomiting 01/11/2019    Constipation 10/07/2018    Cellulitis of left foot     Sepsis ([address] 06/78/2925    Periumbilical hernia     Abscess 12/03/2017    Osteomyelitis ([address] 05/22/2015    Bronchitis 10/15/2013    Hyperlipidemia with target LDL less than 100 07/15/2013    Essential hypertension 07/15/2013    Bilateral carpal tunnel syndrome- left worse than right 06/24/2013    DANIELA (obstructive sleep apnea) 06/20/2013    Urinary frequency 06/20/2013    Neck pain 05/16/2013    Anxiety associated with depression     Panic attack 02/01/2012    Diabetic neuropathy ([address] 12/22/2011    Hiatal hernia 02/04/2011    Diabetes mellitus type 2, improved controlled 02/04/2011     Potassium level much safer today at 5.2  HD planned today after tunnelled HD cath placement  Thank you                  Electronically signed by Scott Salgado DO on 11/28/2022 at 8:07 MD Gris Davis DO [address]  [place] [facility] [employer] [address]  PHONE: [phone]  FAX: [phone]

## 2022-11-28 NOTE — CARE COORDINATION
Reviewed chart and discussed pt in IDR- possible discharge today after procedure. Pt admitted from home, has dialysis outpt at [facility] on [address] M[address]-F. Pt lives with spouse/ children, has cane, walker and transport through [employer]. No CM needs id'd at this time.

## 2022-11-28 NOTE — PROGRESS NOTES
Removed 2.5 L    Patient Name: Hannah Whitney  Patient : 1975  MRN: [id number]     Acct: [de-identified]  Date of Admission: 2022  Room/Bed: 7105/5215-P  Code Status:  Full Code  Allergies:    Allergies   Allergen Reactions    Adhesive Tape Rash    Doxycycline Nausea And Vomiting    Reglan [Metoclopramide] Anxiety     Diagnosis:    Patient Active Problem List   Diagnosis    Hiatal hernia    Diabetes mellitus type 2, improved controlled    Diabetic neuropathy (HCC)    Panic attack    Anxiety associated with depression    Neck pain    DANIELA (obstructive sleep apnea)    Urinary frequency    Bilateral carpal tunnel syndrome- left worse than right    Hyperlipidemia with target LDL less than 100    Essential hypertension    Bronchitis    Osteomyelitis (HCC)    Abscess    Periumbilical hernia    Sepsis (HCC)    Cellulitis of left foot    Constipation    Intractable nausea and vomiting    Uncontrolled type 2 diabetes mellitus    Nausea and vomiting    Diabetic foot infection (HCC)    Acute renal failure (ARF) (HCC)    Cellulitis    Cellulitis of left arm    Cellulitis, scrotum    Acute epididymitis    Irene gangrene    Recurrent major depressive disorder, in full remission ([address]    Generalized anxiety disorder    Morbid obesity with body mass index (BMI) of 40.0 to 44.9 in adult [facility])    Necrotizing fasciitis (HCC)    Syncope    Right groin wound    Ulcer of right groin with fat layer exposed ([address]    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 type 2 diabetes mellitus, with muscle involvement without evidence of necrosis (HCC)    Nephrotic syndrome    Generalized edema    Stage 3b chronic kidney disease (HCC)    Diabetic nephropathy associated with type 2 diabetes mellitus (HCC)    Hypoalbuminemia    Chronic kidney disease, stage IV (severe) (HCC)    FH: CAD (coronary artery disease)    ASCVD (arteriosclerotic cardiovascular disease)    Other proteinuria    Chest pain    Surgical wound dehiscence    CARMEN (acute kidney injury) ([facility]    Anemia    Chest tightness    NSTEMI (non-ST elevated myocardial infarction) ([facility])    WD - Ulcer of forefoot due to type 2 diabetes mellitus ([facility]    ESRD (end stage renal disease) ([facility]    Problem with vascular access         Treatment:  Hemodilaysis 2:1  Priority: Routine  Location: Acute Room    Diabetic: Yes  NPO: No  Isolation Precautions: Dialysis     Consent for Treatment Verified: Yes  Blood Consent Verified: Not Applicable     Safety Verified: Identify (I), Consent (C), Equipment (E), HepB Status (B), Orders Complete (O), Access Verified (A), and Timeliness (T)  Time out performed prior to access at 1435 hours. Report Received from Primary RN at 1400 hours. Primary RN (First Initial, Last Name, Title): DARREN [facility]  Incapacitated Nurse Education Completed: Not Applicable     HBsAg ONLY:  Date Drawn: November 9, 2022       Results: Negative  HBsAb:  Date Drawn:  November 28, 2022       Results: Unknown    Order  Dialysis Bath  K+ (Potassium): 2  Ca+ (Calcium): 2.5  Na+ (Sodium): 138  HCO3 (Bicarb): 30  Bicarbonate Concentrate Lot No.: [id number]  Acid Concentrate Lot No.: [id number]     Na+ Modeling: Not Applicable  Dialyzer: [id number]  Dialysate Temperature (C):  35  Blood Flow Rate (BFR):  350   Dialysate Flow Rate (DFR):   700        Access to be Utilized   Access:  Tunneled Catheter  Location: Internal Jugular  Side: Right   Needle gauge:  Not Applicable  + Bruit/Thrill: Not Applicable    First Use X-ray Verified: Not Applicable  OK to use line order: Yes    Site Assessment:  Signs and Symptoms of Infection/Inflammation: None  If yes: Not Applicable  Dressing: Dry and Intact  Site Prep: Medical Aseptic Technique  Dressing Changed this Treatment: Yes  If yes, by whom: Special Procedures  Date of Last Dressing Change: Not Applicable  Antimicrobial Patch in place?: Yes  Red Alcohol Caps in place?: Yes  Gauze Dressing?: No  Non Dialysis Use?: No  Comment:    Flows: Good, Patent  If access problem, who was notified:     Pre and Post-Assessment  Patient Vitals for the past 8 hrs:   Level of Consciousness Oriented X Heart Rhythm Respiratory Quality/Effort O2 Device Bilateral Breath Sounds Skin Color Skin Condition/Temp Abdomen Inspection Bowel Sounds (All Quadrants) Edema Pain Level   11/28/22 1258 -- -- -- -- -- -- -- -- -- -- -- 8   11/28/22 1357 0 -- -- -- -- -- -- -- -- -- -- --   11/28/22 1411 0 -- -- -- -- -- -- -- -- -- -- --   11/28/22 1416 0 -- -- -- Nasal cannula -- -- -- -- -- -- --   11/28/22 1445 0 4 Regular Unlabored Nasal cannula Clear Pink Dry;Warm Soft Active None --   11/28/22 1800 0 4 Regular Unlabored Nasal cannula Clear Pink Warm Soft Active None --     Labs  Recent Labs     11/26/22 0225 11/27/22  0626 11/28/22  0415   WBC 8.8 7.9 8.0   HGB 9.9* 9.7* 9.5*   HCT 31.2* 30.8* 32.3*    201 110*                                                                  Recent Labs     11/26/22 0225 11/27/22  0626 11/28/22  0415   * 132* 137   K 4.4 6.0* 5.2*   CL 93* 97* 99   CO2 22 18* 18*   BUN 65* 87* 89*   CREATININE 7.4* 9.3* 10.0*   GLUCOSE 237* 192* 154*     IV Drips and Rate/Dose   dextrose      dextrose      sodium chloride        Safety - Before each treatment:   Dialysis Machine No.: [id number]  RO Machine Number: [id number]  Dialyzer Lot No.: [id number]  RO Machine Log Sheet Completed: Yes  Machine Alarm Self Test: Completed; Passed (11/28/22 1445)     Air Foam Detector: Tested, Proper Function, pH Reading  Extracorporeal Circuit Tested for Integrity: Yes  Machine Conductivity: 13.8  Manual Conductivity: 13.8     Bicarbonate Concentrate Lot No.: [id number]  Acid Concentrate Lot No.: [id number]  Manual Ph: 7.4  Bleach Test (Neg): Yes  Bath Temperature: 95 °F (35 °C)  Tubing Lot#: [id number]  Conductivity Meter Serial #: [id number]   All Connections Secure?: Yes  Venous Parameters Set?: Yes  Arterial Parameters Set?: Yes  Saline Line Double Clamped?: Yes  Air Foam Detector Engaged?: Yes  Machine Functioning Alarm Free?  Yes  Prime Given: 200ml    Chlorine Testing - Before each treatment and every 4 hours:   Treatment  Time On: 1445  Time Off: 1745  Weight: 293 lb 11.2 oz (133.2 kg) (11/28/22 0245)  1st check: less than 0.1 ppm at: 1310 hours  2nd check: less than 0.1 ppm at: 1710 hours  3rd check: Not Applicable  (if greater than 0.1 ppm, then check every 30 minutes from secondary)    Access Flows and Pressures  Patient Vitals for the past 8 hrs:   Blood Flow Rate (ml/min) Ultrafiltration Rate (ml/hr) Arterial Pressure (mmHg) Venous Pressure (mmHg) TMP DFR Comments Access Visible   11/28/22 1445 350 ml/min 1000 ml/hr -70 mmHg 120 30 700 tx initiated Yes   11/28/22 1503 350 ml/min 1000 ml/hr -80 mmHg 130 30 700 lines secure Yes   11/28/22 1515 350 ml/min 1000 ml/hr -80 mmHg 120 40 700 no distress Yes   11/28/22 1530 350 ml/min 1000 ml/hr -80 mmHg 120 40 700 resting quietly Yes   11/28/22 1545 350 ml/min 1000 ml/hr -80 mmHg 120 40 700 no discomfort Yes   11/28/22 1600 350 ml/min 1060 ml/hr -90 mmHg 120 40 700 no change Yes   11/28/22 1615 350 ml/min 1060 ml/hr -90 mmHg 120 40 700 resting Yes   11/28/22 1630 350 ml/min 1060 ml/hr -90 mmHg 120 40 700 denies needs Yes   11/28/22 1645 350 ml/min 1070 ml/hr -90 mmHg 130 40 700 no complaints Yes   11/28/22 1700 350 ml/min 1070 ml/hr -90 mmHg 130 40 700 resting Yes   11/28/22 1715 350 ml/min 1070 ml/hr -90 mmHg 130 110 700 alert, calm Yes   11/28/22 1730 350 ml/min 1070 ml/hr -90 mmHg 130 110 700 resting Yes   11/28/22 1745 -- -- -- -- -- -- tx ended Yes     Vital Signs  Patient Vitals for the past 8 hrs:   BP Temp Pulse Resp SpO2   11/28/22 1357 -- -- 83 19 98 %   11/28/22 1358 (!) 117/57 -- -- -- --   11/28/22 1411 (!) 131/90 -- 83 16 100 %   11/28/22 1416 (!) 130/91 -- 82 17 100 %   11/28/22 1445 130/76 97.8 °F (36.6 °C) 82 17 100 %   11/28/22 1503 122/69 -- 84 -- --   11/28/22 1515 (!) 137/90 -- 80 -- --   11/28/22 1530 (!) 146/93 -- 84 -- --   11/28/22 1545 (!) 145/104 -- 82 -- --   11/28/22 1600 (!) 153/130 -- 83 -- --   11/28/22 1615 101/81 -- 81 -- --   11/28/22 1630 130/89 -- 82 -- --   11/28/22 1645 (!) 137/106 -- 83 -- --   11/28/22 1700 127/80 -- 85 -- --   11/28/22 1715 114/75 -- 86 -- --   11/28/22 1730 116/72 -- 81 -- --   11/28/22 1745 110/60 -- 82 -- --   11/28/22 1800 111/67 97.7 °F (36.5 °C) 87 18 100 %     Post-Dialysis  Arterial Catheter Locking Solution:  NS   Venous Catheter Locking Solution:  NS  Post-Treatment Procedures: Blood returned, Catheter Capped, clamped with Saline x2 ports  Machine Disinfection Process: Acid/Vinegar Clean, Heat Disinfect, Exterior Machine Disinfection  Rinseback Volume (ml): 300 ml  Blood Volume Processed (Liters): 55.4 l/min  Dialyzer Clearance: Moderately streaked  Duration of Treatment (minutes): 180 minutes     Hemodialysis Intake (ml): 500 ml  Hemodialysis Output (ml): 3000 ml     Tolerated Treatment: Good  Patient Response to Treatment: well          Provider Notification        Handoff complete and report given to Primary RN at 1822 hours. Primary RN (First Initial, Last Name, Title): DARREN Camargo RN     Education  Person Educated: Patient   Knowledge Base: Substantial  Barriers to Learning?: None  Preferred method of Learning: Oral  Topic(s): Access Care, Signs and Symptoms of Infection, and Procedural   Teaching Tools: Explanation   Response to Education: Verbalized Understanding     Electronically signed by Ruddy Mckeon RN on 11/28/2022 at 6:24 PM

## 2022-11-28 NOTE — PROGRESS NOTES
V2.0  [facility] Hospitalist Progress Note      Name:  Augustin Scott /Age/Sex: 1975  (52 y.o. male)   MRN & CSN:  [id number] & [id number] Encounter Date/Time: 2022 8:01 AM EST    Location:  [facility]- PCP: Doug Kim MD       Hospital Day: 3    Assessment and Plan:   Augustin Scott is a 52 y.o. male with a pmh of SRD on HD, Type II DM, HTN, and anxiety  who presented after his hemodialysis line was accidentally pulled out. Plan:  End-stage renal disease on hemodialysis over the last hemodialysis access:  IR got consulted. Plan for catheter placement again today. Plavix and aspirin on hold. Cardiology on board. Nephrology consulted will appreciate recommendations. Avoid nephrotoxic agents. On Bicarb tablets. Hyperkalemia in the setting of acute renal failure: Needs hemodialysis. Started on MAGI. Will give GKI regimen. Continue telemetry. Monitor BMP twice daily  Non-insulin-dependent diabetes mellitus type 2 on sliding scale insulin  Hypertensive urgency improved on as needed labetalol and home medications  Anxiety on home anxiolytics  Hyperlipidemia: On statin  Stable CAD: On ranolazine    Diet ADULT DIET; Regular; 5 carb choices (75 gm/meal)   DVT Prophylaxis [] Lovenox, [x]  Heparin, [] SCDs, [] Ambulation,  [] Eliquis, [] Xarelto  [] Coumadin   Code Status Full Code   Disposition From: Home  Expected Disposition: Home  Estimated Date of Discharge: 2 to 3 days  Patient requires continued admission due to needs hyperkalemia and renal failure management. N.p.o. over midnight for IR guided catheter placement   Surrogate Decision Maker/ POA      Subjective:     Chief Complaint: Vascular Access Problem (Catheter came out, R upper chest)     The patient was seen at bedside.  NPO, complaining of pain in his leg, wants to eat, difficult to wait for procedure    Review of Systems:    Review of Systems   Constitutional:  Positive for activity change, appetite change and unexpected weight change. Negative for chills and fever. HENT:  Negative for ear pain, hearing loss, sinus pressure, sinus pain and voice change. Eyes:  Negative for pain, discharge and visual disturbance. Respiratory:  Negative for cough, chest tightness and shortness of breath. Cardiovascular:  Positive for leg swelling. Negative for chest pain and palpitations. Gastrointestinal:  Positive for abdominal distention and abdominal pain. Negative for blood in stool, diarrhea, nausea and vomiting. Genitourinary:  Positive for decreased urine volume. Negative for dysuria and frequency. Musculoskeletal:  Positive for arthralgias and back pain. Neurological:  Positive for weakness. Negative for dizziness, light-headedness and headaches. Psychiatric/Behavioral:  Negative for sleep disturbance. Objective: Intake/Output Summary (Last 24 hours) at 11/28/2022 1524  Last data filed at 11/28/2022 1258  Gross per 24 hour   Intake --   Output 400 ml   Net -400 ml          Vitals:   Vitals:    11/28/22 1515   BP: (!) 137/90   Pulse: 80   Resp:    Temp:    SpO2:        Physical Exam:   Physical Exam  Vitals and nursing note reviewed. Constitutional:       Appearance: Normal appearance. He is normal weight. HENT:      Head: Normocephalic. Right Ear: Tympanic membrane normal.      Left Ear: Tympanic membrane normal.      Nose: Nose normal.      Mouth/Throat:      Mouth: Mucous membranes are moist.      Pharynx: Oropharynx is clear. Eyes:      Conjunctiva/sclera: Conjunctivae normal.      Pupils: Pupils are equal, round, and reactive to light. Cardiovascular:      Rate and Rhythm: Normal rate and regular rhythm. Pulses: Normal pulses. Heart sounds: Normal heart sounds. No murmur heard. Pulmonary:      Effort: Pulmonary effort is normal.      Breath sounds: Normal breath sounds. No wheezing, rhonchi or rales. Abdominal:      General: Abdomen is flat.  Bowel sounds are normal. There is distension. Palpations: Abdomen is soft. Tenderness: There is no abdominal tenderness. Musculoskeletal:         General: Signs of injury present. No deformity. Normal range of motion. Cervical back: Normal range of motion and neck supple. Right lower leg: Edema present. Left lower leg: Edema present. Skin:     General: Skin is warm and dry. Coloration: Skin is not jaundiced or pale. Neurological:      General: No focal deficit present. Mental Status: He is alert and oriented to person, place, and time. Mental status is at baseline.           Medications:   Medications:    calcitRIOL  0.5 mcg Oral BID    calcium carbonate  1,000 mg Oral TID WC    ceFAZolin (ANCEF) IVPB  1,000 mg IntraVENous Once    alogliptin  6.25 mg Oral Daily    amLODIPine  10 mg Oral Daily    ARIPiprazole  5 mg Oral Nightly    aspirin  81 mg Oral Daily    atorvastatin  40 mg Oral Nightly    carvedilol  12.5 mg Oral BID WC    clopidogrel  75 mg Oral Daily    insulin glargine  45 Units SubCUTAneous Nightly    insulin lispro  20 Units SubCUTAneous TID WC    insulin lispro  0-8 Units SubCUTAneous TID WC    insulin lispro  0-4 Units SubCUTAneous Nightly    pantoprazole  40 mg Oral BID AC    ranolazine  500 mg Oral BID    sevelamer  800 mg Oral TID WC    sodium chloride flush  5-40 mL IntraVENous 2 times per day    heparin (porcine)  5,000 Units SubCUTAneous 3 times per day      Infusions:    dextrose      dextrose      sodium chloride       PRN Meds: glucose, 4 tablet, PRN  dextrose bolus, 125 mL, PRN   Or  dextrose bolus, 250 mL, PRN  glucagon (rDNA), 1 mg, PRN  dextrose, , Continuous PRN  dextrose bolus, 125 mL, PRN   Or  dextrose bolus, 250 mL, PRN  dextrose, , Continuous PRN  traZODone, 50 mg, Nightly PRN  sodium chloride flush, 5-40 mL, PRN  sodium chloride, , PRN  ondansetron, 4 mg, Q8H PRN   Or  ondansetron, 4 mg, Q6H PRN  polyethylene glycol, 17 g, Daily PRN  acetaminophen, 650 mg, Q6H PRN Or  acetaminophen, 650 mg, Q6H PRN  oxyCODONE-acetaminophen, 1 tablet, Q8H PRN  labetalol, 10 mg, Q4H PRN      Labs      Recent Results (from the past 24 hour(s))   POCT Glucose    Collection Time: 11/27/22  5:11 PM   Result Value Ref Range    POC Glucose 96 70 - 99 MG/DL   POCT Glucose    Collection Time: 11/27/22  8:24 PM   Result Value Ref Range    POC Glucose 99 70 - 99 MG/DL   Basic Metabolic Panel w/ Reflex to MG    Collection Time: 11/28/22  4:15 AM   Result Value Ref Range    Sodium 137 135 - 145 MMOL/L    Potassium 5.2 (H) 3.5 - 5.1 MMOL/L    Chloride 99 99 - 110 mMol/L    CO2 18 (L) 21 - 32 MMOL/L    Anion Gap 20 (H) 4 - 16    BUN 89 (H) 6 - 23 MG/DL    Creatinine 10.0 (H) 0.9 - 1.3 MG/DL    Est, Glom Filt Rate 6 (L) >60 mL/min/1.73m2    Glucose 154 (H) 70 - 99 MG/DL    Calcium 6.9 (LL) 8.3 - 10.6 MG/DL   CBC with Auto Differential    Collection Time: 11/28/22  4:15 AM   Result Value Ref Range    WBC 8.0 4.0 - 10.5 K/CU MM    RBC 3.24 (L) 4.6 - 6.2 M/CU MM    Hemoglobin 9.5 (L) 13.5 - 18.0 GM/DL    Hematocrit 32.3 (L) 42 - 52 %    MCV 99.7 78 - 100 FL    MCH 29.3 27 - 31 PG    MCHC 29.4 (L) 32.0 - 36.0 %    RDW 14.5 11.7 - 14.9 %    Platelets 951 (L) 359 - 440 K/CU MM    MPV 10.2 7.5 - 11.1 FL    Differential Type AUTOMATED DIFFERENTIAL     Segs Relative 78.3 (H) 36 - 66 %    Lymphocytes % 10.9 (L) 24 - 44 %    Monocytes % 8.3 (H) 0 - 4 %    Eosinophils % 1.6 0 - 3 %    Basophils % 0.4 0 - 1 %    Segs Absolute 6.2 K/CU MM    Lymphocytes Absolute 0.9 K/CU MM    Monocytes Absolute 0.7 K/CU MM    Eosinophils Absolute 0.1 K/CU MM    Basophils Absolute 0.0 K/CU MM    Nucleated RBC % 0.0 %    Total Nucleated RBC 0.0 K/CU MM    Total Immature Neutrophil 0.04 K/CU MM    Immature Neutrophil % 0.5 (H) 0 - 0.43 %   POCT Glucose    Collection Time: 11/28/22  8:16 AM   Result Value Ref Range    POC Glucose 140 (H) 70 - 99 MG/DL   Protime/INR & PTT    Collection Time: 11/28/22 10:30 AM   Result Value Ref Range Protime 11.7 11.7 - 14.5 SECONDS    INR 0.91 INDEX    aPTT 33.1 25.1 - 37.1 SECONDS   POCT Glucose    Collection Time: 11/28/22 12:04 PM   Result Value Ref Range    POC Glucose 134 (H) 70 - 99 MG/DL        Imaging/Diagnostics Last 24 Hours   XR CHEST PORTABLE    Result Date: 11/27/2022  EXAMINATION: ONE X-RAY VIEW OF THE CHEST 11/26/2022 2:25 am COMPARISON: November 13, 2022 HISTORY: ORDERING SYSTEM PROVIDED HISTORY:  Dialysis catheter removed TECHNOLOGIST PROVIDED HISTORY: Reason for Exam:  Dialysis catheter removed FINDINGS: No lines or tubes. Normal cardiomediastinal silhouette. The lungs are clear without focal consolidation or pleural effusion. No suspicious pulmonary nodules. No pulmonary edema. No pneumothorax. No acute osseous abnormality. Interval removal of the right internal jugular dialysis catheter. No acute cardiopulmonary disease.        Electronically signed by Alexia Butler MD on 11/28/2022 at 3:24 PM

## 2022-11-29 ENCOUNTER — HOSPITAL ENCOUNTER (OUTPATIENT)
Dept: WOUND CARE | Age: 47
Discharge: HOME | End: 2022-11-29

## 2022-11-29 VITALS
OXYGEN SATURATION: 96 % | BODY MASS INDEX: 43.5 KG/M2 | HEART RATE: 82 BPM | RESPIRATION RATE: 17 BRPM | TEMPERATURE: 97.9 F | SYSTOLIC BLOOD PRESSURE: 134 MMHG | DIASTOLIC BLOOD PRESSURE: 90 MMHG | HEIGHT: 69 IN | WEIGHT: 293.7 LBS

## 2022-11-29 LAB
ANION GAP SERPL CALCULATED.3IONS-SCNC: 15 MMOL/L (ref 4–16)
BASOPHILS ABSOLUTE: 0 K/CU MM
BASOPHILS RELATIVE PERCENT: 0.5 % (ref 0–1)
BUN BLDV-MCNC: 59 MG/DL (ref 6–23)
CALCIUM SERPL-MCNC: 6.9 MG/DL (ref 8.3–10.6)
CHLORIDE BLD-SCNC: 97 MMOL/L (ref 99–110)
CO2: 24 MMOL/L (ref 21–32)
CREAT SERPL-MCNC: 7.5 MG/DL (ref 0.9–1.3)
DIFFERENTIAL TYPE: ABNORMAL
EOSINOPHILS ABSOLUTE: 0.1 K/CU MM
EOSINOPHILS RELATIVE PERCENT: 1.7 % (ref 0–3)
GFR SERPL CREATININE-BSD FRML MDRD: 8 ML/MIN/1.73M2
GLUCOSE BLD-MCNC: 121 MG/DL (ref 70–99)
GLUCOSE BLD-MCNC: 130 MG/DL (ref 70–99)
HCT VFR BLD CALC: 28 % (ref 42–52)
HEMOGLOBIN: 8.9 GM/DL (ref 13.5–18)
IMMATURE NEUTROPHIL %: 0.5 % (ref 0–0.43)
LYMPHOCYTES ABSOLUTE: 0.8 K/CU MM
LYMPHOCYTES RELATIVE PERCENT: 12.2 % (ref 24–44)
MCH RBC QN AUTO: 29.2 PG (ref 27–31)
MCHC RBC AUTO-ENTMCNC: 31.8 % (ref 32–36)
MCV RBC AUTO: 91.8 FL (ref 78–100)
MONOCYTES ABSOLUTE: 0.7 K/CU MM
MONOCYTES RELATIVE PERCENT: 10.1 % (ref 0–4)
NUCLEATED RBC %: 0 %
PDW BLD-RTO: 14.7 % (ref 11.7–14.9)
PLATELET # BLD: 186 K/CU MM (ref 140–440)
PMV BLD AUTO: 10.1 FL (ref 7.5–11.1)
POTASSIUM SERPL-SCNC: 4.3 MMOL/L (ref 3.5–5.1)
RBC # BLD: 3.05 M/CU MM (ref 4.6–6.2)
SEGMENTED NEUTROPHILS ABSOLUTE COUNT: 4.8 K/CU MM
SEGMENTED NEUTROPHILS RELATIVE PERCENT: 75 % (ref 36–66)
SODIUM BLD-SCNC: 136 MMOL/L (ref 135–145)
TOTAL IMMATURE NEUTOROPHIL: 0.03 K/CU MM
TOTAL NUCLEATED RBC: 0 K/CU MM
WBC # BLD: 6.5 K/CU MM (ref 4–10.5)

## 2022-11-29 PROCEDURE — 6370000000 HC RX 637 (ALT 250 FOR IP): Performed by: INTERNAL MEDICINE

## 2022-11-29 PROCEDURE — G0378 HOSPITAL OBSERVATION PER HR: HCPCS

## 2022-11-29 PROCEDURE — 6370000000 HC RX 637 (ALT 250 FOR IP): Performed by: STUDENT IN AN ORGANIZED HEALTH CARE EDUCATION/TRAINING PROGRAM

## 2022-11-29 PROCEDURE — 2580000003 HC RX 258: Performed by: STUDENT IN AN ORGANIZED HEALTH CARE EDUCATION/TRAINING PROGRAM

## 2022-11-29 PROCEDURE — 80048 BASIC METABOLIC PNL TOTAL CA: CPT

## 2022-11-29 PROCEDURE — 82962 GLUCOSE BLOOD TEST: CPT

## 2022-11-29 PROCEDURE — 85025 COMPLETE CBC W/AUTO DIFF WBC: CPT

## 2022-11-29 PROCEDURE — 6360000002 HC RX W HCPCS: Performed by: STUDENT IN AN ORGANIZED HEALTH CARE EDUCATION/TRAINING PROGRAM

## 2022-11-29 PROCEDURE — 36415 COLL VENOUS BLD VENIPUNCTURE: CPT

## 2022-11-29 RX ORDER — TRAZODONE HYDROCHLORIDE 50 MG/1
50 TABLET ORAL NIGHTLY PRN
Qty: 30 TABLET | Refills: 0 | Status: SHIPPED | OUTPATIENT
Start: 2022-11-29

## 2022-11-29 RX ORDER — CALCIUM CARBONATE 200(500)MG
1000 TABLET,CHEWABLE ORAL
Qty: 180 TABLET | Refills: 0 | Status: SHIPPED | OUTPATIENT
Start: 2022-11-29 | End: 2022-12-29

## 2022-11-29 RX ORDER — TRAZODONE HYDROCHLORIDE 50 MG/1
50 TABLET ORAL NIGHTLY PRN
Qty: 30 TABLET | Refills: 0 | Status: SHIPPED | OUTPATIENT
Start: 2022-11-29 | End: 2022-11-29 | Stop reason: SDUPTHER

## 2022-11-29 RX ADMIN — CALCITRIOL CAPSULES 0.25 MCG 0.5 MCG: 0.25 CAPSULE ORAL at 09:10

## 2022-11-29 RX ADMIN — CARVEDILOL 12.5 MG: 6.25 TABLET, FILM COATED ORAL at 09:10

## 2022-11-29 RX ADMIN — SODIUM CHLORIDE, PRESERVATIVE FREE 10 ML: 5 INJECTION INTRAVENOUS at 09:11

## 2022-11-29 RX ADMIN — RANOLAZINE 500 MG: 500 TABLET, FILM COATED, EXTENDED RELEASE ORAL at 09:11

## 2022-11-29 RX ADMIN — INSULIN LISPRO 20 UNITS: 100 INJECTION, SOLUTION INTRAVENOUS; SUBCUTANEOUS at 09:13

## 2022-11-29 RX ADMIN — CLOPIDOGREL BISULFATE 75 MG: 75 TABLET ORAL at 09:11

## 2022-11-29 RX ADMIN — OXYCODONE AND ACETAMINOPHEN 1 TABLET: 5; 325 TABLET ORAL at 06:01

## 2022-11-29 RX ADMIN — SEVELAMER CARBONATE 800 MG: 800 TABLET, FILM COATED ORAL at 09:11

## 2022-11-29 RX ADMIN — HEPARIN SODIUM 5000 UNITS: 5000 INJECTION INTRAVENOUS; SUBCUTANEOUS at 06:01

## 2022-11-29 RX ADMIN — ASPIRIN 81 MG: 81 TABLET, COATED ORAL at 09:11

## 2022-11-29 RX ADMIN — AMLODIPINE BESYLATE 10 MG: 10 TABLET ORAL at 09:11

## 2022-11-29 RX ADMIN — ALOGLIPTIN 6.25 MG: 6.25 TABLET, FILM COATED ORAL at 09:12

## 2022-11-29 RX ADMIN — CALCIUM CARBONATE 1000 MG: 500 TABLET, CHEWABLE ORAL at 09:10

## 2022-11-29 RX ADMIN — PANTOPRAZOLE SODIUM 40 MG: 40 TABLET, DELAYED RELEASE ORAL at 06:01

## 2022-11-29 ASSESSMENT — PAIN DESCRIPTION - DESCRIPTORS: DESCRIPTORS: CRAMPING;ACHING

## 2022-11-29 ASSESSMENT — PAIN SCALES - GENERAL
PAINLEVEL_OUTOF10: 0
PAINLEVEL_OUTOF10: 8

## 2022-11-29 ASSESSMENT — PAIN - FUNCTIONAL ASSESSMENT: PAIN_FUNCTIONAL_ASSESSMENT: PREVENTS OR INTERFERES SOME ACTIVE ACTIVITIES AND ADLS

## 2022-11-29 NOTE — PROGRESS NOTES
Outpatient Pharmacy Progress Note for Meds-to-Beds    Total number of Prescriptions Filled: 1  The following medications were dispensed to the patient during the discharge process:  Calcium carbonate     Additional Documentation:  Medication(s) were delivered to the patient's room prior to discharge      Thank you for letting us serve your patients.   [address] [facility] [place]    [address], [address] [facility]    Phone: [phone]    Fax: [phone]

## 2022-11-29 NOTE — DISCHARGE SUMMARY
V2.0  Discharge Summary    Name:  Susi Bajwa /Age/Sex: 1975 (52 y.o. male)   Admit Date: 2022  Discharge Date: 22    MRN & CSN:  [id number] & [id number] Encounter Date and Time 22 10:24 AM EST    Attending:  Lauren Malik MD Discharging Provider: Lauren Malik MD       Hospital Course:     Brief HPI: Susi Bajwa is a 52 y.o. male with a pmh of SRD on HD, Type II DM, HTN, and anxiety  who presented after his hemodialysis line was accidentally pulled out. Brief Problem Based Course:     End-stage renal disease on hemodialysis over the last hemodialysis access:  IR got consulted. Plan for catheter placement again . Plavix and aspirin resumed. Cardiology on board. Nephrology consulted. Avoid nephrotoxic agents. On Bicarb tablets. Hyperkalemia in the setting of acute renal failure: Needs hemodialysis. Started on MAGI. Will give GKI regimen. Continue telemetry. Monitor BMP twice daily  Non-insulin-dependent diabetes mellitus type 2 on sliding scale insulin  Hypertensive urgency improved on as needed labetalol and home medications  Anxiety on home anxiolytics  Hyperlipidemia: On statin  Stable CAD: On ranolazine      The patient expressed appropriate understanding of, and agreement with the discharge recommendations, medications, and plan.      Consults this admission:  IP CONSULT TO NEPHROLOGY  IP CONSULT TO INTERVENTIONAL RADIOLOGY  IP CONSULT TO INTERVENTIONAL RADIOLOGY  IP CONSULT TO CARDIOLOGY  IP CONSULT TO INTERVENTIONAL RADIOLOGY    Discharge Diagnosis:   Problem with vascular access    Discharge Instruction:   Follow up appointments:   Primary care physician: Janet Sorenson MD within 2 weeks  Diet: renal diet   Activity: activity as tolerated  Disposition: Discharged to:   [x]Home, []C, []SNF, []Acute Rehab, []Hospice   Condition on discharge: Stable  Labs and Tests to be Followed up as an outpatient by PCP or Specialist:     Discharge Medications:        Medication List        START taking these medications      calcium carbonate 500 MG chewable tablet  Commonly known as: TUMS  Take 2 tablets by mouth 3 times daily (with meals)     traZODone 50 MG tablet  Commonly known as: DESYREL  Take 1 tablet by mouth nightly as needed for Sleep            CHANGE how you take these medications      insulin lispro (1 Unit Dial) 100 UNIT/ML Sopn  Commonly known as: HumaLOG KwikPen  Inject 20 Units into the skin in the morning and 20 Units at noon and 20 Units in the evening. Inject before meals. What changed: how much to take     Lantus SoloStar 100 UNIT/ML injection pen  Generic drug: insulin glargine  Inject 45 Units into the skin nightly  What changed: how much to take            CONTINUE taking these medications      alogliptin 6.25 MG Tabs tablet  Commonly known as: NESINA  Take 1 tablet by mouth in the morning. amLODIPine 10 MG tablet  Commonly known as: NORVASC  Take 1 tablet by mouth daily     ARIPiprazole 5 MG tablet  Commonly known as: ABILIFY  Take 1 tablet by mouth at bedtime     aspirin 81 MG EC tablet  Take 1 tablet by mouth daily     atorvastatin 40 MG tablet  Commonly known as: LIPITOR  Take 1 tablet by mouth nightly     calcitRIOL 0.25 MCG capsule  Commonly known as: ROCALTROL  Take 1 capsule by mouth in the morning. carvedilol 12.5 MG tablet  Commonly known as: COREG  Take 1 tablet by mouth 2 times daily (with meals)     clopidogrel 75 MG tablet  Commonly known as: PLAVIX  Take 1 tablet by mouth daily     epoetin paola-epbx 79674 UNIT/ML Soln injection  Commonly known as: RETACRIT  Inject 1 mL into the skin three times a week To be given in HD by nurse     glucose monitoring kit  1 each by Does not apply route once for 1 dose.      Lancets Misc  1 each by Does not apply route daily     oxyCODONE-acetaminophen 5-325 MG per tablet  Commonly known as: PERCOCET     pantoprazole 40 MG tablet  Commonly known as: PROTONIX  Take 1 tablet by mouth in the morning and 1 tablet in the evening. Take before meals. ranolazine 500 MG extended release tablet  Commonly known as: Ranexa  Take 1 tablet by mouth 2 times daily     sevelamer 800 MG tablet  Commonly known as: RENVELA            STOP taking these medications      bacitracin 500 UNIT/GM ointment     chlorthalidone 25 MG tablet  Commonly known as: HYGROTON     furosemide 40 MG tablet  Commonly known as: Lasix     insulin aspart 100 UNIT/ML injection vial  Commonly known as: NOVOLOG     linagliptin 5 MG tablet  Commonly known as: TRADJENTA     methocarbamol 500 MG tablet  Commonly known as: ROBAXIN     metoprolol tartrate 25 MG tablet  Commonly known as: LOPRESSOR     ondansetron 4 MG tablet  Commonly known as: Zofran     george-roland Tabs     sucralfate 1 GM tablet  Commonly known as: CARAFATE     therapeutic multivitamin-minerals tablet               Where to Get Your Medications        These medications were sent to [address] [place] [address]      Phone: [phone]   calcium carbonate 500 MG chewable tablet        Objective Findings at Discharge:   BP (!) 134/90   Pulse 82   Temp 97.9 °F (36.6 °C) (Oral)   Resp 17   Ht 5' 9\" (1.753 m)   Wt 293 lb 11.2 oz (133.2 kg)   SpO2 96%   BMI 43.37 kg/m²       Physical Exam:   Physical Exam  Vitals and nursing note reviewed. Constitutional:       Appearance: Normal appearance. He is normal weight. HENT:      Head: Normocephalic. Right Ear: Tympanic membrane normal.      Left Ear: Tympanic membrane normal.      Nose: Nose normal.      Mouth/Throat:      Mouth: Mucous membranes are moist.      Pharynx: Oropharynx is clear. Eyes:      Conjunctiva/sclera: Conjunctivae normal.      Pupils: Pupils are equal, round, and reactive to light. Cardiovascular:      Rate and Rhythm: Normal rate and regular rhythm. Pulses: Normal pulses.       Heart sounds: Normal heart sounds. No murmur heard. Pulmonary:      Effort: Pulmonary effort is normal.      Breath sounds: Normal breath sounds. No wheezing, rhonchi or rales. Abdominal:      General: Abdomen is flat. Bowel sounds are normal. There is distension. Palpations: Abdomen is soft. Tenderness: There is no abdominal tenderness. Musculoskeletal:         General: Signs of injury present. No deformity. Normal range of motion. Cervical back: Normal range of motion and neck supple. Right lower leg: Edema present. Left lower leg: Edema present. Skin:     General: Skin is warm and dry. Coloration: Skin is not jaundiced or pale. Neurological:      General: No focal deficit present. Mental Status: He is alert and oriented to person, place, and time. Mental status is at baseline. Labs and Imaging   XR CHEST PORTABLE    Result Date: 11/27/2022  EXAMINATION: ONE X-RAY VIEW OF THE CHEST 11/26/2022 2:25 am COMPARISON: November 13, 2022 HISTORY: ORDERING SYSTEM PROVIDED HISTORY:  Dialysis catheter removed TECHNOLOGIST PROVIDED HISTORY: Reason for Exam:  Dialysis catheter removed FINDINGS: No lines or tubes. Normal cardiomediastinal silhouette. The lungs are clear without focal consolidation or pleural effusion. No suspicious pulmonary nodules. No pulmonary edema. No pneumothorax. No acute osseous abnormality. Interval removal of the right internal jugular dialysis catheter. No acute cardiopulmonary disease. IR TUNNELED CVC PLACE WO SQ PORT/PUMP > 5 YEARS    Result Date: 11/28/2022  PROCEDURE: ULTRASOUND GUIDED VASCULAR ACCESS. FLUOROSCOPY GUIDED PLACEMENT OF A SUBCUTANEOUS PORT-A-CATH. 11/28/2022.  HISTORY: ORDERING SYSTEM PROVIDED HISTORY: ESRD TECHNOLOGIST PROVIDED HISTORY: Will not hold antiplatelets due to patient's recent PCI discussed with IR attending Reason for exam:->ESRD How many lumens are being requested?->2 What side should this line be placed?->Right What site is the preferred site? ->Internal Jugular SEDATION: None FLUOROSCOPY DOSE AND TYPE OR TIME AND EXPOSURES: 1 minute fluoroscopy time AK: 92 mGy TECHNIQUE: Maximum sterile barrier technique including hand hygiene, skin prep and sterile ultrasound technique utilized for procedure. Sterile ultrasound technique also utilized for procedure Ultrasound guidance required for procedure to confirm target vessel patency, puncture site selection, real-time intra procedural guidance. Images made for patient's medical file. Informed consent was obtained after a detailed explanation of the procedure including risks, benefits, and alternatives. All aspects of maximum sterile barrier technique were used including washing hands with conventional soap and water or with alcohol-based hand rubs (ABHR), skin preparation, cap, mask, sterile gown, sterile gloves, and sterile full body drape. Local anesthesia was achieved with lidocaine. A micropuncture needle was used to access the right internal jugular vein using ultrasound guidance. An ultrasound image demonstrating patency of the vein with needle tip located within it was obtained and stored in PACS. A 0.035 guidewire was used to place a peel-away sheath. Subcutaneous tunnel was anesthetized and created in the right infraclavicular region through which tunneled dialysis access catheter was placed. Catheter was advanced to the peel-away sheath. Peel-away sheath removed. Catheter ports were aspirated, flushed, capped, locked with delivery heparinized saline and affixed to the patient's skin. Dressing applied. Postprocedure images made. Patient tolerated procedure well. FINDINGS: Pre and intraprocedural sonographic images demonstrate patent right internal jugular vein with access needle seen within it. Fluoroscopic image demonstrates the tip of the port at the cavo-atrial junction/upper portion the right atrium. No complication suggested.      Successful ultrasound and fluoroscopy guided Port-A-Cath/tunneled dialysis access catheter placement via right internal jugular venous approach. CBC:   Recent Labs     11/27/22  0626 11/28/22  0415 11/29/22  0643   WBC 7.9 8.0 6.5   HGB 9.7* 9.5* 8.9*    110* 186     BMP:    Recent Labs     11/27/22  0626 11/28/22  0415 11/29/22  0643   * 137 136   K 6.0* 5.2* 4.3   CL 97* 99 97*   CO2 18* 18* 24   BUN 87* 89* 59*   CREATININE 9.3* 10.0* 7.5*   GLUCOSE 192* 154* 121*     Hepatic: No results for input(s): AST, ALT, ALB, BILITOT, ALKPHOS in the last 72 hours. Lipids:   Lab Results   Component Value Date/Time    CHOL 166 09/22/2022 02:02 PM    CHOL 168 10/15/2013 10:30 AM    HDL 30 09/22/2022 02:02 PM    TRIG 254 09/22/2022 02:02 PM     Hemoglobin A1C:   Lab Results   Component Value Date/Time    LABA1C 5.7 09/14/2022 03:47 AM     TSH: No results found for: TSH  Troponin:   Lab Results   Component Value Date/Time    TROPONINT 0.155 09/13/2022 11:49 AM    TROPONINT 0.156 09/12/2022 08:52 PM    TROPONINT 0.119 09/12/2022 09:50 AM     Lactic Acid: No results for input(s): LACTA in the last 72 hours. BNP: No results for input(s): PROBNP in the last 72 hours.   UA:  Lab Results   Component Value Date/Time    NITRU NEGATIVE 07/24/2022 11:40 PM    COLORU YELLOW 07/24/2022 11:40 PM    PHUR 6.5 06/20/2013 09:51 AM    WBCUA 2 07/24/2022 11:40 PM    RBCUA 1 07/24/2022 11:40 PM    MUCUS RARE 07/24/2022 11:40 PM    TRICHOMONAS NONE SEEN 07/24/2022 11:40 PM    BACTERIA NEGATIVE 07/24/2022 11:40 PM    CLARITYU CLEAR 07/24/2022 11:40 PM    SPECGRAV 1.020 07/24/2022 11:40 PM    LEUKOCYTESUR NEGATIVE 07/24/2022 11:40 PM    UROBILINOGEN 0.2 07/24/2022 11:40 PM    BILIRUBINUR NEGATIVE 07/24/2022 11:40 PM    BILIRUBINUR neg 06/20/2013 09:51 AM    BLOODU SMALL NUMBER OR AMOUNT OBSERVED 07/24/2022 11:40 PM    GLUCOSEU 1,000 06/20/2013 09:51 AM    KETUA NEGATIVE 07/24/2022 11:40 PM    AMORPHOUS RARE 07/24/2022 11:40 PM     Urine Cultures: No results found for: Osmel Patrick  Blood Cultures: No results found for: BC  No results found for: BLOODCULT2  Organism:   Lab Results   Component Value Date/Time    [facility] 01/07/2019 12:08 AM       Time Spent Discharging patient 35 minutes    Electronically signed by aMrcus Russell MD on 11/29/2022 at 10:24 AM

## 2022-11-29 NOTE — PROGRESS NOTES
Pt did not wait for RX for trazodone. Said that he was ready to leave and wasn't that concerned about the medication now.

## 2022-12-06 ENCOUNTER — HOSPITAL ENCOUNTER (OUTPATIENT)
Dept: WOUND CARE | Age: 47
Discharge: HOME | End: 2022-12-06
Payer: MEDICARE

## 2022-12-06 VITALS
TEMPERATURE: 98.8 F | DIASTOLIC BLOOD PRESSURE: 89 MMHG | RESPIRATION RATE: 16 BRPM | HEART RATE: 86 BPM | SYSTOLIC BLOOD PRESSURE: 142 MMHG

## 2022-12-06 DIAGNOSIS — L97.509 ULCER OF FOREFOOT DUE TO TYPE 2 DIABETES MELLITUS (HCC): Primary | ICD-10-CM

## 2022-12-06 DIAGNOSIS — E11.42 DIABETIC POLYNEUROPATHY ASSOCIATED WITH TYPE 2 DIABETES MELLITUS (HCC): ICD-10-CM

## 2022-12-06 DIAGNOSIS — E11.621 ULCER OF FOREFOOT DUE TO TYPE 2 DIABETES MELLITUS (HCC): Primary | ICD-10-CM

## 2022-12-06 PROCEDURE — 6370000000 HC RX 637 (ALT 250 FOR IP): Performed by: SURGERY

## 2022-12-06 PROCEDURE — 11042 DBRDMT SUBQ TIS 1ST 20SQCM/<: CPT

## 2022-12-06 RX ORDER — BACITRACIN, NEOMYCIN, POLYMYXIN B 400; 3.5; 5 [USP'U]/G; MG/G; [USP'U]/G
OINTMENT TOPICAL ONCE
OUTPATIENT
Start: 2022-12-06 | End: 2022-12-06

## 2022-12-06 RX ORDER — CLOBETASOL PROPIONATE 0.5 MG/G
OINTMENT TOPICAL ONCE
OUTPATIENT
Start: 2022-12-06 | End: 2022-12-06

## 2022-12-06 RX ORDER — GENTAMICIN SULFATE 1 MG/G
OINTMENT TOPICAL ONCE
OUTPATIENT
Start: 2022-12-06 | End: 2022-12-06

## 2022-12-06 RX ORDER — LIDOCAINE 50 MG/G
OINTMENT TOPICAL ONCE
OUTPATIENT
Start: 2022-12-06 | End: 2022-12-06

## 2022-12-06 RX ORDER — BETAMETHASONE DIPROPIONATE 0.05 %
OINTMENT (GRAM) TOPICAL ONCE
OUTPATIENT
Start: 2022-12-06 | End: 2022-12-06

## 2022-12-06 RX ORDER — LIDOCAINE HYDROCHLORIDE 40 MG/ML
SOLUTION TOPICAL ONCE
Status: COMPLETED | OUTPATIENT
Start: 2022-12-06 | End: 2022-12-06

## 2022-12-06 RX ORDER — GINSENG 100 MG
CAPSULE ORAL ONCE
OUTPATIENT
Start: 2022-12-06 | End: 2022-12-06

## 2022-12-06 RX ORDER — LIDOCAINE HYDROCHLORIDE 40 MG/ML
SOLUTION TOPICAL ONCE
OUTPATIENT
Start: 2022-12-06 | End: 2022-12-06

## 2022-12-06 RX ORDER — BACITRACIN ZINC AND POLYMYXIN B SULFATE 500; 1000 [USP'U]/G; [USP'U]/G
OINTMENT TOPICAL ONCE
OUTPATIENT
Start: 2022-12-06 | End: 2022-12-06

## 2022-12-06 RX ORDER — LIDOCAINE 40 MG/G
CREAM TOPICAL ONCE
OUTPATIENT
Start: 2022-12-06 | End: 2022-12-06

## 2022-12-06 RX ADMIN — LIDOCAINE HYDROCHLORIDE: 40 SOLUTION TOPICAL at 14:51

## 2022-12-06 ASSESSMENT — PAIN DESCRIPTION - FREQUENCY: FREQUENCY: CONTINUOUS

## 2022-12-06 ASSESSMENT — PAIN SCALES - GENERAL: PAINLEVEL_OUTOF10: 7

## 2022-12-06 ASSESSMENT — PAIN DESCRIPTION - PAIN TYPE: TYPE: CHRONIC PAIN

## 2022-12-06 ASSESSMENT — PAIN DESCRIPTION - ONSET: ONSET: ON-GOING

## 2022-12-06 ASSESSMENT — PAIN DESCRIPTION - ORIENTATION: ORIENTATION: LEFT

## 2022-12-06 ASSESSMENT — PAIN DESCRIPTION - DESCRIPTORS: DESCRIPTORS: SHARP

## 2022-12-06 ASSESSMENT — PAIN DESCRIPTION - LOCATION: LOCATION: FOOT

## 2022-12-06 ASSESSMENT — PAIN - FUNCTIONAL ASSESSMENT: PAIN_FUNCTIONAL_ASSESSMENT: PREVENTS OR INTERFERES SOME ACTIVE ACTIVITIES AND ADLS

## 2022-12-06 NOTE — DISCHARGE INSTRUCTIONS
PHYSICIAN ORDERS AND DISCHARGE INSTRUCTIONS     Wound cleansing:              Do not scrub or use excessive force. Wash hands with soap and water before and after dressing changes. Prior to applying a clean dressing, cleanse wound with normal saline,               wound cleanser, or mild soap and water. Ask the physician or nurse before getting the wound(s) wet in a shower     Daily Wound management:             Keep weight off wounds and reposition every 2 hours. Avoid standing for long periods of time. Apply wraps/stockings in AM and remove at bedtime. If swelling is present, elevate legs to the level of the heart or above for              30 minutes 4-5 times a day and/or when sitting. When taking antibiotics take entire prescription as ordered by              physician do not stop taking until medicine is all gone. Grafts:                   Wound Care Notes:  Rx:    Cultures:                                          Orders for this week: 22                  Left plantar wound: Wash with soap and water, pat dry. Apply anasept gel and stimulen to wound bed. Cover with ca alginate and foam  Wrap with conform  Secure with tape. Change daily. Give Forefoot offloader in clinic on 22. Follow Up Instructions: At the [facility] in 1 week   Primary Wound Care Provider: Dr Clint Jackson  Call  for any questions or concerns.   Central [phone] for imaging lab work

## 2022-12-06 NOTE — PROGRESS NOTES
[facility] Progress Note With Procedure    Desirae Renee  AGE: 52 y.o. GENDER: male  : 1975  EPISODE DATE:  2022     Subjective:     Chief Complaint   Patient presents with    Wound Check     Left Foot         HISTORY of PRESENT ILLNESS      Desirae Renee is a 52 y.o. male who presents today for wound evaluation of new wound on the bottom of the left foot underneath the big toe. Changes from previous. Patient is a diabetic. His last hemoglobin A1c was around 6.5%. Does get numbness tingling burning in his feet secondary to this. PAST MEDICAL HISTORY        Diagnosis Date    Abscess     scrotal    Acute renal failure (ARF) ([address] 2019    Anxiety associated with depression     Anxiety associated with depression     Back pain 2012    Chest pain 2013    Diabetic nephropathy ([address]     Diabetic neuropathy ([address] 2011    Diabetic ulcer of left midfoot associated with type 2 diabetes mellitus, with fat layer exposed ([address] 2017    Diverticulosis     C scope + Dr. Morris Rogel    DM (diabetes mellitus), type 2 ([address]     DR. 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 Morris Rogel    Necrotizing fasciitis [facility])     Nose fracture     Panic attacks     Pericarditis     Hospitalized with s/p heart cath normal    Peripheral autonomic neuropathy due to diabetes mellitus ([address]     Axonal EMG- NCS, 2011    Sebaceous cyst 2011    URI (upper respiratory infection) 2012    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 type 2 diabetes mellitus, with muscle involvement without evidence of necrosis ([address] 2021    WD-Wound, surgical, infected, initial encounter 2017    Wrist fracture 1986       PAST SURGICAL HISTORY    Past Surgical History:   Procedure Laterality Date    ABDOMEN SURGERY      CARDIAC CATHETERIZATION  2003, 2013    Normal (Dr. Lynsey Ray)    COLONOSCOPY  06/02/2011    Pandiverticulosis, Nonbleeding internal hemorrhoids, Repeat colonoscopy at age 48- Dr. Meryle Gell      x 3    FOOT SURGERY Left 12/21/2021    EXCISION OF HEAD LEFT 2ND METATARSAL performed by Nidia Adorno DPM at [address] NONTUNNELED VASCULAR CATHETER  07/25/2022    IR NONTUNNELED VASCULAR CATHETER 7/25/2022 [facility] SPECIAL PROCEDURES    IR TUNNELED CATHETER PLACEMENT GREATER THAN 5 YEARS  07/27/2022    IR TUNNELED CATHETER PLACEMENT GREATER THAN 5 YEARS 7/27/2022 [facility] SPECIAL PROCEDURES    IR TUNNELED CATHETER PLACEMENT GREATER THAN 5 YEARS  11/28/2022    IR TUNNELED CATHETER PLACEMENT GREATER THAN 5 YEARS 11/28/2022 [facility] SPECIAL PROCEDURES    NOSE SURGERY  1993    \"had reconstruction surgery on nose a year after the other nose surgery\"    OTHER SURGICAL HISTORY Right 05/22/2015    I & D right great toe with partial amputation    OTHER SURGICAL HISTORY  12/04/2017    inc and drainage of abcess    OH DEEP INCIS FOOT BONE INFECTN Left 09/22/2018    LEFT FOOT DEBRIDEMENT INCISION AND DRAINAGE TOP AND BOTTOM performed by Shakeel Lutz DPM at [facility] N/A 05/15/2021    SCROTAL AND PERINEAL DEBRIDEMENT performed by Phil Fountain MD at [place] [address] 05/16/2021    SCROTAL RE-DEBRIDEMENT  INCISION AND DRAINAGE performed by Phil Fountain MD at [address] N/A 06/18/2013    sebaceous cyst removal times 4     TOE AMPUTATION      right great toe    TOE AMPUTATION Right 03/23/2019    TOE AMPUTATION RIGHT 5TH TOE performed by Shakeel Lutz DPM at [address] Right 08/06/2019    TOE AMPUTATION RIGHT 4TH TOE performed by Shakeel Lutz DPM at [address] [facility] ENDOSCOPY  06/02/2011    Mild gastritis with gianfranco severe antritis, small hiatal hernia, Dr. Ciro Gonzalez 2019    EGD BIOPSY performed by Colton Mireles MD at [facility] N/A 2022    EGD DIAGNOSTIC ONLY performed by Colton Mireles MD at [facility] History   Problem Relation Age of Onset    Cancer Mother         ?site    Stroke Mother     Bleeding Prob Mother     Diabetes Father     Heart Disease Father     High Blood Pressure Father     Obesity Father     Kidney Disease Father     Diabetes Sister     High Blood Pressure Sister     Mental Illness Sister     Obesity Sister     High Blood Pressure Other     Mental Illness Other         bipolar    Other Son         cyst in ear canal    ADHD Daughter        SOCIAL HISTORY    Social History     Tobacco Use    Smoking status: Former     Years: 20.00     Types: Cigarettes     Quit date: 2022     Years since quittin.4    Smokeless tobacco: Never    Tobacco comments:     Smokes when drinking/social   Vaping Use    Vaping Use: Never used   Substance Use Topics    Alcohol use: Not Currently     Comment: occ    Drug use: Yes     Frequency: 7.0 times per week     Types: Marijuana Dontracee Preciado)     Comment: 21 @        ALLERGIES    Allergies   Allergen Reactions    Adhesive Tape Rash    Doxycycline Nausea And Vomiting    Reglan [Metoclopramide] Anxiety       MEDICATIONS    Current Outpatient Medications on File Prior to Encounter   Medication Sig Dispense Refill    calcium carbonate (TUMS) 500 MG chewable tablet Take 2 tablets by mouth 3 times daily (with meals) 180 tablet 0    traZODone (DESYREL) 50 MG tablet Take 1 tablet by mouth nightly as needed for Sleep 30 tablet 0    atorvastatin (LIPITOR) 40 MG tablet Take 1 tablet by mouth nightly 30 tablet 3    carvedilol (COREG) 12.5 MG tablet Take 1 tablet by mouth 2 times daily (with meals) 60 tablet 3    epoetin paola-epbx (RETACRIT) 40698 UNIT/ML SOLN injection Inject 1 mL into the skin three times a week To be given in HD by nurse 6.6 mL 1    amLODIPine (NORVASC) 10 MG tablet Take 1 tablet by mouth daily 30 tablet 3    sevelamer (RENVELA) 800 MG tablet Take 800 mg by mouth 3 times daily (with meals)      ranolazine (RANEXA) 500 MG extended release tablet Take 1 tablet by mouth 2 times daily 60 tablet 3    aspirin 81 MG EC tablet Take 1 tablet by mouth daily 30 tablet 0    clopidogrel (PLAVIX) 75 MG tablet Take 1 tablet by mouth daily 30 tablet 0    [DISCONTINUED] metoprolol tartrate (LOPRESSOR) 25 MG tablet Take 0.5 tablets by mouth 2 times daily 30 tablet 0    oxyCODONE-acetaminophen (PERCOCET) 5-325 MG per tablet Take 1.5 tablets by mouth daily. alogliptin (NESINA) 6.25 MG TABS tablet Take 1 tablet by mouth in the morning. 30 tablet 0    insulin lispro, 1 Unit Dial, (HUMALOG KWIKPEN) 100 UNIT/ML SOPN Inject 20 Units into the skin in the morning and 20 Units at noon and 20 Units in the evening. Inject before meals. 5 pen 0    insulin glargine (LANTUS SOLOSTAR) 100 UNIT/ML injection pen Inject 45 Units into the skin nightly 5 pen 0    ARIPiprazole (ABILIFY) 5 MG tablet Take 1 tablet by mouth at bedtime 30 tablet 0    pantoprazole (PROTONIX) 40 MG tablet Take 1 tablet by mouth in the morning and 1 tablet in the evening. Take before meals. 30 tablet 0    calcitRIOL (ROCALTROL) 0.25 MCG capsule Take 1 capsule by mouth in the morning.  30 capsule 0    [DISCONTINUED] furosemide (LASIX) 40 MG tablet Take 1 tablet by mouth daily 30 tablet 1    [DISCONTINUED] insulin aspart (NOVOLOG) 100 UNIT/ML injection vial Inject 35 Units into the skin 3 times daily (before meals)      [DISCONTINUED] chlorthalidone (HYGROTON) 25 MG tablet Take 1 tablet by mouth daily 30 tablet 3    [DISCONTINUED] linagliptin (TRADJENTA) 5 MG tablet Take 1 tablet by mouth daily 30 tablet 5    Lancets MISC 1 each by Does not apply route daily 100 each 3    glucose monitoring kit (FREESTYLE) monitoring kit 1 each by Does not apply route once for 1 dose. 1 kit 0     No current facility-administered medications on file prior to encounter. REVIEW OF SYSTEMS    Pertinent items are noted in HPI. Constitutional: Negative for systemic symptoms including fever, chills and malaise. Objective:      BP (!) 142/89   Pulse 86   Temp 98.8 °F (37.1 °C) (Temporal)   Resp 16     PHYSICAL EXAM    General: The patient is in no acute distress. Mental status:  Patient is appropriate, is  oriented to place and plan of care. Dermatologic exam: Visual inspection of the periwound reveals the skin to be normal in turgor and texture  Wound exam: see wound description below in procedure note      Assessment:     Problem List Items Addressed This Visit          Endocrine    Diabetic neuropathy ([facility] (Chronic)    Relevant Orders    Initiate Outpatient Wound Care Protocol    WD - Ulcer of forefoot due to type 2 diabetes mellitus ([facility] - Primary    Relevant Orders    Initiate Outpatient Wound Care Protocol    XR FOOT LEFT (MIN 3 VIEWS)     Procedure Note    Indications:  Based on my examination of this patient's wound(s) today, sharp excision into necrotic subcutaneous tissue is required to promote healing and evaluate the extent of previous healing. Performed by: Laura Pollock DPM    Consent obtained: Yes    Time out taken:  Yes    Pain Control: lidocaine      Debridement:Non-excisional Debridement    Using # 10 blade scalpel the wound(s) was/were sharply debrided down through and including the removal of subcutaneous tissue.         Devitalized Tissue Debrided:  necrotic/eschar    Pre Debridement Measurements:  Are located in the Wound Documentation Flow Sheet    All active wounds listed below with today's date are evaluated  Wound(s)    debrided this date include # : 1       Wound 12/08/17 #3 abdoment (onset 3 days ago) SURGICAL (Active)   Number of days: 1824       Wound 12/08/17 #4 Lt plantar (onset 6 months 12/06/22 1437   Undermining Maxium Distance (cm) 0.7 12/06/22 1437   Wound Assessment Dry 12/06/22 1437   Drainage Amount Moderate 12/06/22 1437   Drainage Description Serosanguinous 12/06/22 1437   Odor None 12/06/22 1437   Shazia-wound Assessment Hyperkeratosis (callous) 12/06/22 1437   Margins Defined edges; Unattached edges 12/06/22 1437   Wound Thickness Description not for Pressure Injury Full thickness 12/06/22 1437   Number of days: 96           Percent of Wound(s) Debrided: approximately 100%    Total  Area  Debrided:  0.396 cm 3    Bleeding:  Minimal    Hemostasis Achieved:  by pressure    Procedural Pain:  0  / 10     Post Procedural Pain:  0 / 10     Response to treatment:  Well tolerated by patient. Status of wound progress and description from last visit:   stable. Plan:       Discharge Instructions         PHYSICIAN ORDERS AND DISCHARGE INSTRUCTIONS     Wound cleansing:              Do not scrub or use excessive force. Wash hands with soap and water before and after dressing changes. Prior to applying a clean dressing, cleanse wound with normal saline,               wound cleanser, or mild soap and water. Ask the physician or nurse before getting the wound(s) wet in a shower     Daily Wound management:             Keep weight off wounds and reposition every 2 hours. Avoid standing for long periods of time. Apply wraps/stockings in AM and remove at bedtime. If swelling is present, elevate legs to the level of the heart or above for              30 minutes 4-5 times a day and/or when sitting. When taking antibiotics take entire prescription as ordered by              physician do not stop taking until medicine is all gone.                             Grafts:                   Wound Care Notes:  Rx:    Cultures:                                          Orders for this week: 12/6/22 Left plantar wound: Wash with soap and water, pat dry. Apply anasept gel and stimulen to wound bed. Cover with ca alginate and foam  Wrap with conform  Secure with tape. Change daily. Give Forefoot offloader in clinic on 22. Follow Up Instructions: At the [address] [place] [address] Road in 1 week   Primary Wound Care Provider: Dr Gabrielle Kidd  Call  for any questions or concerns. Central [phone] for imaging lab work        Treatment Note Wound 22 #1 Left Plantar-Dressing/Treatment:  (anasept gel, stimulen, ca alginate, foam, conform,tape.)    Written Patient Dismissal Instructions Given       -Patient was seen, evaluated, and treated today  -Conservative and surgical treatment options discussed in detail with the patient today  -No signs of infection. Monitor off antibiotics  -Continue with forefoot offloading shoe at all times when ambulating  -Order for left foot x-ray placed today  -Did give patient prescription for diabetic shoes.   To set up appointment for this because the fittings for these have been several months out.  -Any concerns of infection before the next visit go to the emergency room  -Follow-up 2 weeks for recheck       Electronically signed by Denise Corbett DPM on 2022 at 3:20 PM

## 2022-12-23 ENCOUNTER — HOSPITAL ENCOUNTER (OUTPATIENT)
Age: 47
Setting detail: SPECIMEN
Discharge: HOME | End: 2022-12-23

## 2022-12-26 PROCEDURE — 87040 BLOOD CULTURE FOR BACTERIA: CPT

## 2022-12-28 LAB
CULTURE: NORMAL
Lab: NORMAL
SPECIMEN: NORMAL

## 2023-01-10 ENCOUNTER — HOSPITAL ENCOUNTER (OUTPATIENT)
Dept: WOUND CARE | Age: 48
Discharge: HOME | End: 2023-01-10
Payer: MEDICARE

## 2023-01-10 VITALS — RESPIRATION RATE: 18 BRPM

## 2023-01-10 DIAGNOSIS — E11.42 DIABETIC POLYNEUROPATHY ASSOCIATED WITH TYPE 2 DIABETES MELLITUS (HCC): ICD-10-CM

## 2023-01-10 DIAGNOSIS — E11.621 ULCER OF FOREFOOT DUE TO TYPE 2 DIABETES MELLITUS (HCC): Primary | ICD-10-CM

## 2023-01-10 DIAGNOSIS — L97.509 ULCER OF FOREFOOT DUE TO TYPE 2 DIABETES MELLITUS (HCC): Primary | ICD-10-CM

## 2023-01-10 PROCEDURE — 11719 TRIM NAIL(S) ANY NUMBER: CPT

## 2023-01-10 PROCEDURE — 11042 DBRDMT SUBQ TIS 1ST 20SQCM/<: CPT

## 2023-01-10 RX ORDER — LIDOCAINE 50 MG/G
OINTMENT TOPICAL ONCE
OUTPATIENT
Start: 2023-01-10 | End: 2023-01-10

## 2023-01-10 RX ORDER — LIDOCAINE HYDROCHLORIDE 40 MG/ML
SOLUTION TOPICAL ONCE
OUTPATIENT
Start: 2023-01-10 | End: 2023-01-10

## 2023-01-10 RX ORDER — GINSENG 100 MG
CAPSULE ORAL ONCE
OUTPATIENT
Start: 2023-01-10 | End: 2023-01-10

## 2023-01-10 RX ORDER — BACITRACIN ZINC AND POLYMYXIN B SULFATE 500; 1000 [USP'U]/G; [USP'U]/G
OINTMENT TOPICAL ONCE
OUTPATIENT
Start: 2023-01-10 | End: 2023-01-10

## 2023-01-10 RX ORDER — GENTAMICIN SULFATE 1 MG/G
OINTMENT TOPICAL ONCE
OUTPATIENT
Start: 2023-01-10 | End: 2023-01-10

## 2023-01-10 RX ORDER — BETAMETHASONE DIPROPIONATE 0.05 %
OINTMENT (GRAM) TOPICAL ONCE
OUTPATIENT
Start: 2023-01-10 | End: 2023-01-10

## 2023-01-10 RX ORDER — BACITRACIN, NEOMYCIN, POLYMYXIN B 400; 3.5; 5 [USP'U]/G; MG/G; [USP'U]/G
OINTMENT TOPICAL ONCE
OUTPATIENT
Start: 2023-01-10 | End: 2023-01-10

## 2023-01-10 RX ORDER — LIDOCAINE 40 MG/G
CREAM TOPICAL ONCE
OUTPATIENT
Start: 2023-01-10 | End: 2023-01-10

## 2023-01-10 RX ORDER — CLOBETASOL PROPIONATE 0.5 MG/G
OINTMENT TOPICAL ONCE
OUTPATIENT
Start: 2023-01-10 | End: 2023-01-10

## 2023-01-10 ASSESSMENT — PAIN SCALES - WONG BAKER: WONGBAKER_NUMERICALRESPONSE: 0

## 2023-01-10 ASSESSMENT — PAIN DESCRIPTION - DESCRIPTORS: DESCRIPTORS: THROBBING;SHOOTING

## 2023-01-10 ASSESSMENT — PAIN SCALES - GENERAL: PAINLEVEL_OUTOF10: 6

## 2023-01-10 ASSESSMENT — PAIN DESCRIPTION - LOCATION: LOCATION: FOOT

## 2023-01-10 ASSESSMENT — PAIN DESCRIPTION - ORIENTATION: ORIENTATION: LEFT

## 2023-01-10 NOTE — DISCHARGE INSTRUCTIONS
PHYSICIAN ORDERS AND DISCHARGE INSTRUCTIONS     Wound cleansing:              Do not scrub or use excessive force. Wash hands with soap and water before and after dressing changes. Prior to applying a clean dressing, cleanse wound with normal saline,               wound cleanser, or mild soap and water. Ask the physician or nurse before getting the wound(s) wet in a shower     Daily Wound management:             Keep weight off wounds and reposition every 2 hours. Avoid standing for long periods of time. Apply wraps/stockings in AM and remove at bedtime. If swelling is present, elevate legs to the level of the heart or above for              30 minutes 4-5 times a day and/or when sitting. When taking antibiotics take entire prescription as ordered by              physician do not stop taking until medicine is all gone. Grafts:                   Wound Care Notes:  Rx:    Cultures:                                          Orders for this week: 1/10/23                  Left plantar wound: Wash with soap and water, pat dry. Apply anasept gel and stimulen to wound bed. Cover with ca alginate and foam  Wrap with conform  Secure with tape. Change daily. Paint toenails with betadine today 1/10/23 due to trimming nails. Give Forefoot offloader in clinic on 22. Follow Up Instructions: At the [address] in 2 weeks  Primary Wound Care Provider: Dr Jessica Sandoval  Call  for any questions or concerns.   Central [phone] for imaging lab work

## 2023-01-11 NOTE — PROGRESS NOTES
[facility] Center Progress Note With Procedure    Angie Aguilera  AGE: 52 y.o. GENDER: male  : 1975  EPISODE DATE:  1/10/2023     Subjective:     Chief Complaint   Patient presents with    Wound Check     LLE         HISTORY of PRESENT ILLNESS      Angie Aguilera is a 52 y.o. male who presents today for wound evaluation of new wound on the bottom of the left foot underneath the big toe. No changes to this area from previous. Slight increase in pain. Also complaining of thickened elongated toenails to both of his feet. Unable to cut them secondary to thickness and length. Feels relief with periodic debridement. Has still not gotten appointment scheduled to be fit for new diabetic shoes    Patient is a diabetic. His last hemoglobin A1c was around 6.5%. Does get numbness tingling burning in his feet secondary to this. PAST MEDICAL HISTORY        Diagnosis Date    2010    scrotal    Acute renal failure (ARF) ([address] 2019    Anxiety associated with depression     Anxiety associated with depression     Back pain 2012    Chest pain 2013    Diabetic nephropathy ([address]     Diabetic neuropathy ([address] 2011    Diabetic ulcer of left midfoot associated with type 2 diabetes mellitus, with fat layer exposed ([address] 2017    Diverticulosis     C scope + Dr. Poli Hayward    DM (diabetes mellitus), type 2 ([address]     DR. 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 Poli Hayward    Necrotizing fasciitis [facility])     Nose fracture     Panic attacks     Pericarditis     Hospitalized with s/p heart cath normal    Peripheral autonomic neuropathy due to diabetes mellitus ([address]     Axonal EMG- NCS, 2011    Sebaceous cyst 2011    URI (upper respiratory infection) 02/27/2012    WD-Diabetic ulcer of left midfoot Adve 2 associated with type 2 diabetes mellitus, with muscle involvement without evidence of necrosis [address] [address] [facility] 9/21/2021    WD-Wound, surgical, infected, initial encounter 12/08/2017    Wrist fracture 1986       PAST SURGICAL HISTORY    Past Surgical History:   Procedure Laterality Date    [address] [facility] [address]  2003, 2013    Normal (Dr. Phani Mcgowan)    COLONOSCOPY  06/02/2011    Pandiverticulosis, Nonbleeding internal hemorrhoids, Repeat colonoscopy at age 48- Dr. Freeda Heimlich      x 3    FOOT SURGERY Left 12/21/2021    EXCISION OF HEAD LEFT 2ND METATARSAL performed by Lorie García DPM at [address] NONTUNNELED VASCULAR CATHETER  07/25/2022    IR NONTUNNELED VASCULAR CATHETER 7/25/2022 [facility] SPECIAL PROCEDURES    IR TUNNELED CATHETER PLACEMENT GREATER THAN 5 YEARS  07/27/2022    IR TUNNELED CATHETER PLACEMENT GREATER THAN 5 YEARS 7/27/2022 [facility] SPECIAL PROCEDURES    IR TUNNELED CATHETER PLACEMENT GREATER THAN 5 YEARS  11/28/2022    IR TUNNELED CATHETER PLACEMENT GREATER THAN 5 YEARS 11/28/2022 [facility] SPECIAL PROCEDURES    NOSE SURGERY  1993    \"had reconstruction surgery on nose a year after the other nose surgery\"    OTHER SURGICAL HISTORY Right 05/22/2015    I & D right great toe with partial amputation    OTHER SURGICAL HISTORY  12/04/2017    inc and drainage of abcess    IL INCISION BONE CORTEX FOOT Left 09/22/2018    LEFT FOOT DEBRIDEMENT INCISION AND DRAINAGE TOP AND BOTTOM performed by Alice San DPM at [place] [address] 05/15/2021    SCROTAL AND PERINEAL DEBRIDEMENT performed by Emir White MD at [place] [address] 05/16/2021    SCROTAL RE-DEBRIDEMENT  INCISION AND DRAINAGE performed by Emir White MD at [facility] N/A 06/18/2013    sebaceous cyst removal times 4     TOE AMPUTATION      right great toe    TOE AMPUTATION Right 03/23/2019    TOE AMPUTATION RIGHT 5TH TOE performed by Jose Caba DPM at [facility] OR    TOE AMPUTATION Right 2019    TOE AMPUTATION RIGHT 4TH TOE performed by Jose Caba DPM at [facility] OR    TONSILLECTOMY AND ADENOIDECTOMY  1988    UPPER GASTROINTESTINAL ENDOSCOPY  2011    Mild gastritis with moderatley severe antritis, small hiatal hernia, Dr. Medina    UPPER GASTROINTESTINAL ENDOSCOPY N/A 2019    EGD BIOPSY performed by Jose Maria Cornelius MD at [facility] ENDOSCOPY    UPPER GASTROINTESTINAL ENDOSCOPY N/A 2022    EGD DIAGNOSTIC ONLY performed by Jose Maria Cornelius MD at [facility] ENDOSCOPY       FAMILY HISTORY    Family History   Problem Relation Age of Onset    Cancer Mother         ?site    Stroke Mother     Bleeding Prob Mother     Diabetes Father     Heart Disease Father     High Blood Pressure Father     Obesity Father     Kidney Disease Father     Diabetes Sister     High Blood Pressure Sister     Mental Illness Sister     Obesity Sister     High Blood Pressure Other     Mental Illness Other         bipolar    Other Son         cyst in ear canal    ADHD Daughter        SOCIAL HISTORY    Social History     Tobacco Use    Smoking status: Former     Years: 20.00     Types: Cigarettes     Quit date: 2022     Years since quittin.5    Smokeless tobacco: Never    Tobacco comments:     Smokes when drinking/social   Vaping Use    Vaping Use: Never used   Substance Use Topics    Alcohol use: Not Currently     Comment: occ    Drug use: Yes     Frequency: 7.0 times per week     Types: Marijuana (Weed)     Comment: 21 @        ALLERGIES    Allergies   Allergen Reactions    Adhesive Tape Rash    Doxycycline Nausea And Vomiting    Reglan [Metoclopramide] Anxiety       MEDICATIONS    Current Outpatient Medications on File Prior to Encounter   Medication Sig Dispense Refill    traZODone (DESYREL) 50 MG tablet Take 1 tablet by mouth nightly as needed for Sleep 30 tablet 0    atorvastatin (LIPITOR) 40 MG tablet Take 1 tablet by mouth nightly 30  tablet 3    carvedilol (COREG) 12.5 MG tablet Take 1 tablet by mouth 2 times daily (with meals) 60 tablet 3    epoetin paola-epbx (RETACRIT) 74236 UNIT/ML SOLN injection Inject 1 mL into the skin three times a week To be given in HD by nurse 6.6 mL 1    amLODIPine (NORVASC) 10 MG tablet Take 1 tablet by mouth daily 30 tablet 3    sevelamer (RENVELA) 800 MG tablet Take 800 mg by mouth 3 times daily (with meals)      ranolazine (RANEXA) 500 MG extended release tablet Take 1 tablet by mouth 2 times daily 60 tablet 3    aspirin 81 MG EC tablet Take 1 tablet by mouth daily 30 tablet 0    clopidogrel (PLAVIX) 75 MG tablet Take 1 tablet by mouth daily 30 tablet 0    [DISCONTINUED] metoprolol tartrate (LOPRESSOR) 25 MG tablet Take 0.5 tablets by mouth 2 times daily 30 tablet 0    oxyCODONE-acetaminophen (PERCOCET) 5-325 MG per tablet Take 1.5 tablets by mouth daily. alogliptin (NESINA) 6.25 MG TABS tablet Take 1 tablet by mouth in the morning. 30 tablet 0    insulin lispro, 1 Unit Dial, (HUMALOG KWIKPEN) 100 UNIT/ML SOPN Inject 20 Units into the skin in the morning and 20 Units at noon and 20 Units in the evening. Inject before meals. 5 pen 0    insulin glargine (LANTUS SOLOSTAR) 100 UNIT/ML injection pen Inject 45 Units into the skin nightly 5 pen 0    ARIPiprazole (ABILIFY) 5 MG tablet Take 1 tablet by mouth at bedtime 30 tablet 0    pantoprazole (PROTONIX) 40 MG tablet Take 1 tablet by mouth in the morning and 1 tablet in the evening. Take before meals. 30 tablet 0    calcitRIOL (ROCALTROL) 0.25 MCG capsule Take 1 capsule by mouth in the morning.  30 capsule 0    [DISCONTINUED] furosemide (LASIX) 40 MG tablet Take 1 tablet by mouth daily 30 tablet 1    [DISCONTINUED] insulin aspart (NOVOLOG) 100 UNIT/ML injection vial Inject 35 Units into the skin 3 times daily (before meals)      [DISCONTINUED] chlorthalidone (HYGROTON) 25 MG tablet Take 1 tablet by mouth daily 30 tablet 3    [DISCONTINUED] linagliptin (TRADJENTA) 5 MG tablet Take 1 tablet by mouth daily 30 tablet 5    Lancets MISC 1 each by Does not apply route daily 100 each 3    glucose monitoring kit (FREESTYLE) monitoring kit 1 each by Does not apply route once for 1 dose. 1 kit 0     No current facility-administered medications on file prior to encounter. REVIEW OF SYSTEMS    Pertinent items are noted in HPI. Constitutional: Negative for systemic symptoms including fever, chills and malaise. Objective:      Resp 18     PHYSICAL EXAM    General: The patient is in no acute distress. Mental status:  Patient is appropriate, is  oriented to place and plan of care. Dermatologic exam: Visual inspection of the periwound reveals the skin to be normal in turgor and texture. Nails 1-3 on the right foot thickened and elongated with subungual debris and lysis from the underlying nail bed with yellow thickened discolored dystrophic changes. Wound exam: see wound description below in procedure note      Assessment:     Problem List Items Addressed This Visit          Endocrine    Diabetic neuropathy ([address] (Chronic)    WD - Ulcer of forefoot due to type 2 diabetes mellitus ([address] - Primary     Procedure Note    Indications:  Based on my examination of this patient's wound(s) today, sharp excision into necrotic subcutaneous tissue is required to promote healing and evaluate the extent of previous healing. Performed by: Moni Forrest DPM    Consent obtained: Yes    Time out taken:  Yes    Pain Control: lidocaine      Debridement:Non-excisional Debridement    Using # 10 blade scalpel the wound(s) was/were sharply debrided down through and including the removal of subcutaneous tissue.         Devitalized Tissue Debrided:  necrotic/eschar    Pre Debridement Measurements:  Are located in the Wound Documentation Flow Sheet    All active wounds listed below with today's date are evaluated  Wound(s)    debrided this date include # : 1     Wound 12/08/17 #3 abdoment (onset 3 days ago) SURGICAL (Active)   Number of days: 1859       Wound 12/08/17 #4 Lt plantar (onset 6 months ago) DIABETIC AL[place]A 1 (Active)   Number of days: 1859       Wound 05/18/18 # 5 LEFT PLANTAR (ONSET 1 YEAR AGO) DM WAG 1 (Active)   Number of days: 9600       Wound 09/17/18 Left dorsal foot and 4th toe amp site 9/19/18 (Active)   Number of days: 5260       Incision 05/22/15 Foot Right (Active)   Number of days: 4781       Incision 12/04/17 Abdomen Mid (Active)   Number of days: 0309       Wound 01/11/19 left plantar foot wound (Active)   Number of days: 1461       Wound 03/21/19 Toe (Comment  which one) Anterior;Right (Active)   Number of days: 1392       Wound 03/21/19 Foot Left;Posterior hard callus area (Active)   Number of days: 0370       Wound 03/24/21 Left;Plantar (Active)   Number of days: 793       Wound 05/13/21 Left;Plantar (Active)   Number of days: 107       Wound 05/17/21 Groin Right scrotum extends to lower buttock (Active)   Number of days: 604       Wound 09/01/22 #1 Left Plantar (Active)   Wound Image   01/10/23 1417   Wound Etiology Diabetic 01/10/23 1417   Dressing Status New dressing applied;Clean;Dry; Intact 01/10/23 1436   Wound Cleansed Wound cleanser 01/10/23 1417   Offloading for Diabetic Foot Ulcers Diabetic shoes/inserts 01/10/23 1436   Wound Length (cm) 0 cm 01/10/23 1417   Wound Width (cm) 0 cm 01/10/23 1417   Wound Depth (cm) 0 cm 01/10/23 1417   Wound Surface Area (cm^2) 0 cm^2 01/10/23 1417   Change in Wound Size % (l*w) 100 01/10/23 1417   Wound Volume (cm^3) 0 cm^3 01/10/23 1417   Wound Healing % 100 01/10/23 1417   Post-Procedure Length (cm) 0.1 cm 01/10/23 1432   Post-Procedure Width (cm) 0.1 cm 01/10/23 1432   Post-Procedure Depth (cm) 0.1 cm 01/10/23 1432   Post-Procedure Surface Area (cm^2) 0.01 cm^2 01/10/23 1432   Post-Procedure Volume (cm^3) 0.001 cm^3 01/10/23 1432   Distance Tunneling (cm) 0 cm 01/10/23 1417   Tunneling Position ___ O'Clock 0 01/10/23 1417   Undermining Starts ___ O'Clock 0 01/10/23 1417   Undermining Ends___ O'Clock 0 01/10/23 1417   Undermining Maxium Distance (cm) 0 01/10/23 1417   Wound Assessment Dry 01/10/23 1417   Drainage Amount None 01/10/23 1417   Drainage Description Serosanguinous 12/06/22 1437   Odor None 01/10/23 1417   Shazia-wound Assessment Hyperkeratosis (callous) 01/10/23 1417   Margins Defined edges; Unattached edges 01/10/23 1417   Wound Thickness Description not for Pressure Injury Full thickness 01/10/23 1417   Number of days: 131           Percent of Wound(s) Debrided: approximately 100%    Total  Area  Debrided:  0.001 cm 3    Bleeding:  Minimal    Hemostasis Achieved:  by pressure    Procedural Pain:  0  / 10     Post Procedural Pain:  0 / 10     Response to treatment:  Well tolerated by patient. Status of wound progress and description from last visit:   stable. Plan:       Discharge Instructions         PHYSICIAN ORDERS AND DISCHARGE INSTRUCTIONS     Wound cleansing:              Do not scrub or use excessive force. Wash hands with soap and water before and after dressing changes. Prior to applying a clean dressing, cleanse wound with normal saline,               wound cleanser, or mild soap and water. Ask the physician or nurse before getting the wound(s) wet in a shower     Daily Wound management:             Keep weight off wounds and reposition every 2 hours. Avoid standing for long periods of time. Apply wraps/stockings in AM and remove at bedtime. If swelling is present, elevate legs to the level of the heart or above for              30 minutes 4-5 times a day and/or when sitting. When taking antibiotics take entire prescription as ordered by              physician do not stop taking until medicine is all gone.                             Grafts:                   Wound Care Notes:  Rx:    Cultures: Orders for this week: 1/10/23                  Left plantar wound: Wash with soap and water, pat dry. Apply anasept gel and stimulen to wound bed. Cover with ca alginate and foam  Wrap with conform  Secure with tape. Change daily. Paint toenails with betadine today 1/10/23 due to trimming nails. Give Forefoot offloader in clinic on 22. Follow Up Instructions: At the [address] [place] [address] in 2 weeks  Primary Wound Care Provider: Dr Gumaro Mccain  Call  for any questions or concerns. Central [phone] for imaging lab work        Treatment Note Wound 22 #1 Left Plantar-Dressing/Treatment:  (anasept gel, stimulen, ca alginate, foam, conform,tape.)    Written Patient Dismissal Instructions Given       -Patient was seen, evaluated, and treated today  -Conservative and surgical treatment options discussed in detail with the patient today  -No signs of infection. Monitor off antibiotics  -Nails 1 through 3 on the right foot were debrided in thickness and length with a sterile nail nipper. Separate and medically necessary procedure to allow for pain-free ambulation. Patient tolerated well  -Stressed importance of getting fit for diabetic shoes to help relieve pressure to the area of the left foot to try to help with healing  -Still not gotten left foot x-ray. Would like him to get this before the next visit.   -Any concerns of infection before the next visit go to the emergency room  -Follow-up 2 weeks for recheck       Electronically signed by Oj Batista DPM on 2023 at 8:45 AM

## 2023-01-17 ENCOUNTER — HOSPITAL ENCOUNTER (OUTPATIENT)
Dept: WOUND CARE | Age: 48
Discharge: HOME | End: 2023-01-17
Payer: MEDICARE

## 2023-01-17 VITALS
SYSTOLIC BLOOD PRESSURE: 132 MMHG | HEART RATE: 89 BPM | DIASTOLIC BLOOD PRESSURE: 87 MMHG | RESPIRATION RATE: 18 BRPM | TEMPERATURE: 98 F

## 2023-01-17 DIAGNOSIS — E11.621 ULCER OF FOREFOOT DUE TO TYPE 2 DIABETES MELLITUS (HCC): Primary | ICD-10-CM

## 2023-01-17 DIAGNOSIS — L97.509 ULCER OF FOREFOOT DUE TO TYPE 2 DIABETES MELLITUS (HCC): Primary | ICD-10-CM

## 2023-01-17 DIAGNOSIS — E11.42 DIABETIC POLYNEUROPATHY ASSOCIATED WITH TYPE 2 DIABETES MELLITUS (HCC): ICD-10-CM

## 2023-01-17 PROCEDURE — 11042 DBRDMT SUBQ TIS 1ST 20SQCM/<: CPT

## 2023-01-17 PROCEDURE — 6370000000 HC RX 637 (ALT 250 FOR IP): Performed by: SURGERY

## 2023-01-17 RX ORDER — BETAMETHASONE DIPROPIONATE 0.05 %
OINTMENT (GRAM) TOPICAL ONCE
OUTPATIENT
Start: 2023-01-17 | End: 2023-01-17

## 2023-01-17 RX ORDER — BACITRACIN ZINC AND POLYMYXIN B SULFATE 500; 1000 [USP'U]/G; [USP'U]/G
OINTMENT TOPICAL ONCE
OUTPATIENT
Start: 2023-01-17 | End: 2023-01-17

## 2023-01-17 RX ORDER — GINSENG 100 MG
CAPSULE ORAL ONCE
OUTPATIENT
Start: 2023-01-17 | End: 2023-01-17

## 2023-01-17 RX ORDER — LIDOCAINE 50 MG/G
OINTMENT TOPICAL ONCE
Status: COMPLETED | OUTPATIENT
Start: 2023-01-17 | End: 2023-01-17

## 2023-01-17 RX ORDER — CLOBETASOL PROPIONATE 0.5 MG/G
OINTMENT TOPICAL ONCE
OUTPATIENT
Start: 2023-01-17 | End: 2023-01-17

## 2023-01-17 RX ORDER — LIDOCAINE 40 MG/G
CREAM TOPICAL ONCE
OUTPATIENT
Start: 2023-01-17 | End: 2023-01-17

## 2023-01-17 RX ORDER — BACITRACIN, NEOMYCIN, POLYMYXIN B 400; 3.5; 5 [USP'U]/G; MG/G; [USP'U]/G
OINTMENT TOPICAL ONCE
OUTPATIENT
Start: 2023-01-17 | End: 2023-01-17

## 2023-01-17 RX ORDER — LIDOCAINE HYDROCHLORIDE 40 MG/ML
SOLUTION TOPICAL ONCE
OUTPATIENT
Start: 2023-01-17 | End: 2023-01-17

## 2023-01-17 RX ORDER — LIDOCAINE 50 MG/G
OINTMENT TOPICAL ONCE
OUTPATIENT
Start: 2023-01-17 | End: 2023-01-17

## 2023-01-17 RX ORDER — GENTAMICIN SULFATE 1 MG/G
OINTMENT TOPICAL ONCE
OUTPATIENT
Start: 2023-01-17 | End: 2023-01-17

## 2023-01-17 RX ADMIN — LIDOCAINE: 50 OINTMENT TOPICAL at 14:09

## 2023-01-17 ASSESSMENT — PAIN DESCRIPTION - PAIN TYPE: TYPE: CHRONIC PAIN

## 2023-01-17 ASSESSMENT — PAIN SCALES - GENERAL: PAINLEVEL_OUTOF10: 7

## 2023-01-17 ASSESSMENT — PAIN DESCRIPTION - LOCATION: LOCATION: FOOT

## 2023-01-17 ASSESSMENT — PAIN DESCRIPTION - ONSET: ONSET: ON-GOING

## 2023-01-17 ASSESSMENT — PAIN DESCRIPTION - FREQUENCY: FREQUENCY: CONTINUOUS

## 2023-01-17 ASSESSMENT — PAIN DESCRIPTION - DESCRIPTORS: DESCRIPTORS: SHARP

## 2023-01-17 ASSESSMENT — PAIN DESCRIPTION - ORIENTATION: ORIENTATION: LEFT

## 2023-01-17 ASSESSMENT — PAIN - FUNCTIONAL ASSESSMENT: PAIN_FUNCTIONAL_ASSESSMENT: PREVENTS OR INTERFERES SOME ACTIVE ACTIVITIES AND ADLS

## 2023-01-17 NOTE — DISCHARGE INSTRUCTIONS
PHYSICIAN ORDERS AND DISCHARGE INSTRUCTIONS     Wound cleansing:              Do not scrub or use excessive force. Wash hands with soap and water before and after dressing changes. Prior to applying a clean dressing, cleanse wound with normal saline,               wound cleanser, or mild soap and water. Ask the physician or nurse before getting the wound(s) wet in a shower     Daily Wound management:             Keep weight off wounds and reposition every 2 hours. Avoid standing for long periods of time. Apply wraps/stockings in AM and remove at bedtime. If swelling is present, elevate legs to the level of the heart or above for              30 minutes 4-5 times a day and/or when sitting. When taking antibiotics take entire prescription as ordered by              physician do not stop taking until medicine is all gone. Grafts:                   Wound Care Notes:  Rx:    Cultures:                                          Orders for this week: 23                  Left plantar wound: Wash with soap and water, pat dry. Apply anasept gel and stimulen to wound bed. Cover with ca alginate and foam  Wrap with conform  Secure with tape. Change daily. Give Forefoot offloader in clinic on 22. Follow Up Instructions: At the [facility] in 2 weeks  Primary Wound Care Provider: Dr Zeke Salcedo  Call  for any questions or concerns.   Central [phone] for imaging lab work

## 2023-01-17 NOTE — PROGRESS NOTES
[facility] Progress Note With Procedure    Poncho Escamilla  AGE: 52 y.o. GENDER: male  : 1975  EPISODE DATE:  2023     Subjective:     Chief Complaint   Patient presents with    Wound Check     Left foot         HISTORY of PRESENT ILLNESS      Poncho Escamilla is a 52 y.o. male who presents today for wound evaluation of new wound on the bottom of the left foot underneath the big toe. Patient complaining of worsening smell to the area today. No drainage or redness. No constitutional symptoms. Patient is a diabetic. His last hemoglobin A1c was around 6.5%. Does get numbness tingling burning in his feet secondary to this. PAST MEDICAL HISTORY        Diagnosis Date    Abscess     scrotal    Acute renal failure (ARF) ([address] 2019    Anxiety associated with depression     Anxiety associated with depression     Back pain 2012    Chest pain 2013    Diabetic nephropathy ([address]     Diabetic neuropathy ([address] 2011    Diabetic ulcer of left midfoot associated with type 2 diabetes mellitus, with fat layer exposed ([address] 2017    Diverticulosis     C scope + Dr. Glenys Galan    DM (diabetes mellitus), type 2 ([address]     DR. 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 Glenys Galan    Necrotizing fasciitis [facility])     Nose fracture     Panic attacks     Pericarditis     Hospitalized with s/p heart cath normal    Peripheral autonomic neuropathy due to diabetes mellitus ([address]     Axonal EMG- NCS, 2011    Sebaceous cyst 2011    URI (upper respiratory infection) 2012    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 type 2 diabetes mellitus, with muscle involvement without evidence of necrosis ([address] 2021    WD-Wound, surgical, infected, initial encounter 12/08/2017    Wrist fracture 1986       PAST SURGICAL HISTORY    Past Surgical History:   Procedure Laterality Date    ABDOMEN SURGERY      CARDIAC CATHETERIZATION  2003, 2013    Normal (Dr. Pancho Jacobo)    COLONOSCOPY  06/02/2011    Pandiverticulosis, Nonbleeding internal hemorrhoids, Repeat colonoscopy at age 48- Dr. Ismael Long      x 3    FOOT SURGERY Left 12/21/2021    EXCISION OF HEAD LEFT 2ND METATARSAL performed by Tiburcio Smith DPM at [facility] NONTUNNELED VASCULAR CATHETER  07/25/2022    IR NONTUNNELED VASCULAR CATHETER 7/25/2022 [facility] SPECIAL PROCEDURES    IR TUNNELED CATHETER PLACEMENT GREATER THAN 5 YEARS  07/27/2022    IR TUNNELED CATHETER PLACEMENT GREATER THAN 5 YEARS 7/27/2022 [facility] SPECIAL PROCEDURES    IR TUNNELED CATHETER PLACEMENT GREATER THAN 5 YEARS  11/28/2022    IR TUNNELED CATHETER PLACEMENT GREATER THAN 5 YEARS 11/28/2022 [facility] SPECIAL PROCEDURES    NOSE SURGERY  1993    \"had reconstruction surgery on nose a year after the other nose surgery\"    OTHER SURGICAL HISTORY Right 05/22/2015    I & D right great toe with partial amputation    OTHER SURGICAL HISTORY  12/04/2017    inc and drainage of abcess    OR INCISION BONE CORTEX FOOT Left 09/22/2018    LEFT FOOT DEBRIDEMENT INCISION AND DRAINAGE TOP AND BOTTOM performed by Sergey Cuevas DPM at [facility] N/A 05/15/2021    SCROTAL AND PERINEAL DEBRIDEMENT performed by Nigel Madison MD at [place] [address] 05/16/2021    SCROTAL RE-DEBRIDEMENT  INCISION AND DRAINAGE performed by Nigel aMdison MD at [address] N/A 06/18/2013    sebaceous cyst removal times 4     TOE AMPUTATION      right great toe    TOE AMPUTATION Right 03/23/2019    TOE AMPUTATION RIGHT 5TH TOE performed by Sergey Cuevas DPM at [address] Right 08/06/2019    TOE AMPUTATION RIGHT 4TH TOE performed by Sergey Cuevas DPM at [facility] 1988    UPPER GASTROINTESTINAL ENDOSCOPY  2011    Mild gastritis with moderatley severe antritis, small hiatal hernia, Dr. Rakesh Marquis 2019    EGD BIOPSY performed by Mian Suarez MD at [facility] N/A 2022    EGD DIAGNOSTIC ONLY performed by Mian Suarez MD at [facility] [address] History   Problem Relation Age of Onset    Cancer Mother         ?site    Stroke Mother     Bleeding Prob Mother     Diabetes Father     Heart Disease Father     High Blood Pressure Father     Obesity Father     Kidney Disease Father     Diabetes Sister     High Blood Pressure Sister     Mental Illness Sister     Obesity Sister     High Blood Pressure Other     Mental Illness Other         bipolar    Other Son         cyst in ear canal    ADHD Daughter        SOCIAL HISTORY    Social History     Tobacco Use    Smoking status: Former     Years: .     Types: Cigarettes     Quit date: 2022     Years since quittin.5    Smokeless tobacco: Never    Tobacco comments:     Smokes when drinking/social   Vaping Use    Vaping Use: Never used   Substance Use Topics    Alcohol use: Not Currently     Comment: occ    Drug use: Yes     Frequency: 7.0 times per week     Types: Marijuana Jaylyn Cedillo)     Comment: 21 @        ALLERGIES    Allergies   Allergen Reactions    Adhesive Tape Rash    Doxycycline Nausea And Vomiting    Reglan [Metoclopramide] Anxiety       MEDICATIONS    Current Outpatient Medications on File Prior to Encounter   Medication Sig Dispense Refill    traZODone (DESYREL) 50 MG tablet Take 1 tablet by mouth nightly as needed for Sleep 30 tablet 0    atorvastatin (LIPITOR) 40 MG tablet Take 1 tablet by mouth nightly 30 tablet 3    carvedilol (COREG) 12.5 MG tablet Take 1 tablet by mouth 2 times daily (with meals) 60 tablet 3    epoetin paola-epbx (RETACRIT) 13648 UNIT/ML SOLN injection Inject 1 mL into the skin three times a week To be given in HD by nurse 6.6 mL 1    amLODIPine (NORVASC) 10 MG tablet Take 1 tablet by mouth daily 30 tablet 3    sevelamer (RENVELA) 800 MG tablet Take 800 mg by mouth 3 times daily (with meals)      ranolazine (RANEXA) 500 MG extended release tablet Take 1 tablet by mouth 2 times daily 60 tablet 3    aspirin 81 MG EC tablet Take 1 tablet by mouth daily 30 tablet 0    clopidogrel (PLAVIX) 75 MG tablet Take 1 tablet by mouth daily 30 tablet 0    [DISCONTINUED] metoprolol tartrate (LOPRESSOR) 25 MG tablet Take 0.5 tablets by mouth 2 times daily 30 tablet 0    oxyCODONE-acetaminophen (PERCOCET) 5-325 MG per tablet Take 1.5 tablets by mouth daily. alogliptin (NESINA) 6.25 MG TABS tablet Take 1 tablet by mouth in the morning. 30 tablet 0    insulin lispro, 1 Unit Dial, (HUMALOG KWIKPEN) 100 UNIT/ML SOPN Inject 20 Units into the skin in the morning and 20 Units at noon and 20 Units in the evening. Inject before meals. 5 pen 0    insulin glargine (LANTUS SOLOSTAR) 100 UNIT/ML injection pen Inject 45 Units into the skin nightly 5 pen 0    ARIPiprazole (ABILIFY) 5 MG tablet Take 1 tablet by mouth at bedtime 30 tablet 0    pantoprazole (PROTONIX) 40 MG tablet Take 1 tablet by mouth in the morning and 1 tablet in the evening. Take before meals. 30 tablet 0    calcitRIOL (ROCALTROL) 0.25 MCG capsule Take 1 capsule by mouth in the morning.  30 capsule 0    [DISCONTINUED] furosemide (LASIX) 40 MG tablet Take 1 tablet by mouth daily 30 tablet 1    [DISCONTINUED] insulin aspart (NOVOLOG) 100 UNIT/ML injection vial Inject 35 Units into the skin 3 times daily (before meals)      [DISCONTINUED] chlorthalidone (HYGROTON) 25 MG tablet Take 1 tablet by mouth daily 30 tablet 3    [DISCONTINUED] linagliptin (TRADJENTA) 5 MG tablet Take 1 tablet by mouth daily 30 tablet 5    Lancets MISC 1 each by Does not apply route daily 100 each 3    glucose monitoring kit (FREESTYLE) monitoring kit 1 each by Does not apply route once for 1 dose. 1 kit 0     No current facility-administered medications on file prior to encounter. REVIEW OF SYSTEMS    Pertinent items are noted in HPI. Constitutional: Negative for systemic symptoms including fever, chills and malaise. Objective:      /87   Pulse 89   Temp 98 °F (36.7 °C) (Temporal)   Resp 18     PHYSICAL EXAM    General: The patient is in no acute distress. Mental status:  Patient is appropriate, is  oriented to place and plan of care. Dermatologic exam: Visual inspection of the periwound reveals the skin to be normal in turgor and texture. Nails 1-3 on the right foot thickened and elongated with subungual debris and lysis from the underlying nail bed with yellow thickened discolored dystrophic changes. Wound exam: see wound description below in procedure note      Assessment:     Problem List Items Addressed This Visit          Endocrine    Diabetic neuropathy ([facility] (Chronic)    Relevant Orders    Initiate Outpatient Wound Care Protocol    WD - Ulcer of forefoot due to type 2 diabetes mellitus ([facility] - Primary    Relevant Orders    Initiate Outpatient Wound Care Protocol     Procedure Note    Indications:  Based on my examination of this patient's wound(s) today, sharp excision into necrotic subcutaneous tissue is required to promote healing and evaluate the extent of previous healing. Performed by: Melba Vallecillo DPM    Consent obtained: Yes    Time out taken:  Yes    Pain Control: lidocaineAnesthetic  Anesthetic: 5% Lidocaine Ointment Topical     Debridement:Non-excisional Debridement    Using # 10 blade scalpel the wound(s) was/were sharply debrided down through and including the removal of subcutaneous tissue.         Devitalized Tissue Debrided:  necrotic/eschar    Pre Debridement Measurements:  Are located in the Wound Documentation Flow Sheet    All active wounds listed below with today's date are evaluated  Wound(s)    debrided this date include # : 1 Wound 12/08/17 #3 abdoment (onset 3 days ago) SURGICAL (Active)   Number of days: 1866       Wound 12/08/17 #4 Lt plantar (onset 6 months ago) DIABETIC ALCALA 1 (Active)   Number of days: 1866       Wound 05/18/18 # 5 LEFT PLANTAR (ONSET 1 YEAR AGO) DM WAG 1 (Active)   Number of days: 8749       Wound 09/17/18 Left dorsal foot and 4th toe amp site 9/19/18 (Active)   Number of days: 2877       Incision 05/22/15 Foot Right (Active)   Number of days: 1091       Incision 12/04/17 Abdomen Mid (Active)   Number of days: 1869       Wound 01/11/19 left plantar foot wound (Active)   Number of days: 1196       Wound 03/21/19 Toe (Comment  which one) Anterior;Right (Active)   Number of days: 8810       Wound 03/21/19 Foot Left;Posterior hard callus area (Active)   Number of days: 3938       Wound 03/24/21 Left;Plantar (Active)   Number of days: 483       Wound 05/13/21 Left;Plantar (Active)   Number of days: 613       Wound 05/17/21 Groin Right scrotum extends to lower buttock (Active)   Number of days: 610       Wound 09/01/22 #1 Left Plantar (Active)   Wound Image   01/10/23 1417   Wound Etiology Diabetic 01/17/23 1431   Dressing Status New dressing applied;Clean;Dry; Intact 01/17/23 1431   Wound Cleansed Wound cleanser; Soap and water 01/17/23 1402   Offloading for Diabetic Foot Ulcers Diabetic shoes/inserts 01/17/23 1431   Wound Length (cm) 1.5 cm 01/17/23 1402   Wound Width (cm) 1 cm 01/17/23 1402   Wound Depth (cm) 0.5 cm 01/17/23 1402   Wound Surface Area (cm^2) 1.5 cm^2 01/17/23 1402   Change in Wound Size % (l*w) 72.73 01/17/23 1402   Wound Volume (cm^3) 0.75 cm^3 01/17/23 1402   Wound Healing % -36 01/17/23 1402   Post-Procedure Length (cm) 1.5 cm 01/17/23 1429   Post-Procedure Width (cm) 1 cm 01/17/23 1429   Post-Procedure Depth (cm) 0.5 cm 01/17/23 1429   Post-Procedure Surface Area (cm^2) 1.5 cm^2 01/17/23 1429   Post-Procedure Volume (cm^3) 0.75 cm^3 01/17/23 1429   Distance Tunneling (cm) 0 cm 01/10/23 1417 Tunneling Position ___ O'Clock 0 01/17/23 1402   Undermining Starts ___ O'Clock 600 01/17/23 1402   Undermining Ends___ O'Clock 1200 01/17/23 1402   Undermining Maxium Distance (cm) 0.3 01/17/23 1402   Wound Assessment Pink/red 01/17/23 1402   Drainage Amount Small 01/17/23 1402   Drainage Description Serosanguinous 01/17/23 1402   Odor Moderate 01/17/23 1402   Shazia-wound Assessment Hyperkeratosis (callous); Maceration 01/17/23 1402   Margins Undefined edges 01/17/23 1402   Wound Thickness Description not for Pressure Injury Full thickness 01/17/23 1402   Number of days: 138               Percent of Wound(s) Debrided: approximately 100%    Total  Area  Debrided:  0.75 cm 3    Bleeding:  Minimal    Hemostasis Achieved:  by pressure    Procedural Pain:  0  / 10     Post Procedural Pain:  0 / 10     Response to treatment:  Well tolerated by patient. Status of wound progress and description from last visit:   stable. Plan:       Discharge Instructions         PHYSICIAN ORDERS AND DISCHARGE INSTRUCTIONS     Wound cleansing:              Do not scrub or use excessive force. Wash hands with soap and water before and after dressing changes. Prior to applying a clean dressing, cleanse wound with normal saline,               wound cleanser, or mild soap and water. Ask the physician or nurse before getting the wound(s) wet in a shower     Daily Wound management:             Keep weight off wounds and reposition every 2 hours. Avoid standing for long periods of time. Apply wraps/stockings in AM and remove at bedtime. If swelling is present, elevate legs to the level of the heart or above for              30 minutes 4-5 times a day and/or when sitting. When taking antibiotics take entire prescription as ordered by              physician do not stop taking until medicine is all gone. Grafts:                   Wound Care Notes:  Rx:    Cultures:                                          Orders for this week: 23                  Left plantar wound: Wash with soap and water, pat dry. Apply anasept gel and stimulen to wound bed. Cover with ca alginate and foam  Wrap with conform  Secure with tape. Change daily. Give Forefoot offloader in clinic on 22. Follow Up Instructions: At the [address] [place] [address] in 2 weeks  Primary Wound Care Provider: Dr Thom Lerma  Call  for any questions or concerns. Central [phone] for imaging lab work        Treatment Note Wound 22 #1 Left Plantar-Dressing/Treatment:  (anasept gel, stimulen, ca alginate, foam, conform)    Written Patient Dismissal Instructions Given       -Patient was seen, evaluated, and treated today  -Conservative and surgical treatment options discussed in detail with the patient today  -No signs of infection. Monitor off antibiotics  -Would still like patient to get left foot x-ray as he still has not obtained this.   Says that he will get it before the next visit  -No concerning signs noted to the wound today with regards to infection  -Continue with daily local wound care  -Any concerns of infection before the next visit go to the emergency room  -Follow-up 2 weeks for recheck and to go over xray       Electronically signed by Severa Maul, DPM on 2023 at 2:35 PM

## 2023-02-07 ENCOUNTER — HOSPITAL ENCOUNTER (INPATIENT)
Age: 48
LOS: 3 days | Discharge: HOME | End: 2023-02-10
Attending: STUDENT IN AN ORGANIZED HEALTH CARE EDUCATION/TRAINING PROGRAM | Admitting: INTERNAL MEDICINE
Payer: MEDICARE

## 2023-02-07 ENCOUNTER — APPOINTMENT (OUTPATIENT)
Dept: GENERAL RADIOLOGY | Age: 48
End: 2023-02-07
Payer: MEDICARE

## 2023-02-07 DIAGNOSIS — E83.51 HYPOCALCEMIA: Primary | ICD-10-CM

## 2023-02-07 DIAGNOSIS — E87.29 HIGH ANION GAP METABOLIC ACIDOSIS: ICD-10-CM

## 2023-02-07 DIAGNOSIS — R06.00 DYSPNEA, UNSPECIFIED TYPE: ICD-10-CM

## 2023-02-07 DIAGNOSIS — N19 UREMIA: ICD-10-CM

## 2023-02-07 DIAGNOSIS — R09.02 HYPOXIA: ICD-10-CM

## 2023-02-07 PROBLEM — E87.70 FLUID OVERLOAD: Status: ACTIVE | Noted: 2023-02-07

## 2023-02-07 LAB
ALBUMIN SERPL-MCNC: 3.4 GM/DL (ref 3.4–5)
ALP BLD-CCNC: 68 IU/L (ref 40–129)
ALT SERPL-CCNC: 8 U/L (ref 10–40)
ANION GAP SERPL CALCULATED.3IONS-SCNC: 18 MMOL/L (ref 4–16)
AST SERPL-CCNC: 12 IU/L (ref 15–37)
BASE EXCESS: 7 (ref 0–3.3)
BASOPHILS ABSOLUTE: 0 K/CU MM
BASOPHILS RELATIVE PERCENT: 0.5 % (ref 0–1)
BILIRUB SERPL-MCNC: 0.2 MG/DL (ref 0–1)
BUN SERPL-MCNC: 92 MG/DL (ref 6–23)
CALCIUM SERPL-MCNC: 6.7 MG/DL (ref 8.3–10.6)
CHLORIDE BLD-SCNC: 99 MMOL/L (ref 99–110)
CO2: 19 MMOL/L (ref 21–32)
COMMENT: ABNORMAL
CREAT SERPL-MCNC: 9.9 MG/DL (ref 0.9–1.3)
DIFFERENTIAL TYPE: ABNORMAL
EOSINOPHILS ABSOLUTE: 0.2 K/CU MM
EOSINOPHILS RELATIVE PERCENT: 2.4 % (ref 0–3)
GFR SERPL CREATININE-BSD FRML MDRD: 6 ML/MIN/1.73M2
GLUCOSE SERPL-MCNC: 208 MG/DL (ref 70–99)
HCO3 VENOUS: 20.6 MMOL/L (ref 19–25)
HCT VFR BLD CALC: 28.3 % (ref 42–52)
HEMOGLOBIN: 9.1 GM/DL (ref 13.5–18)
IMMATURE NEUTROPHIL %: 0.4 % (ref 0–0.43)
LYMPHOCYTES ABSOLUTE: 1.3 K/CU MM
LYMPHOCYTES RELATIVE PERCENT: 15.8 % (ref 24–44)
MCH RBC QN AUTO: 29.1 PG (ref 27–31)
MCHC RBC AUTO-ENTMCNC: 32.2 % (ref 32–36)
MCV RBC AUTO: 90.4 FL (ref 78–100)
MONOCYTES ABSOLUTE: 0.6 K/CU MM
MONOCYTES RELATIVE PERCENT: 7.6 % (ref 0–4)
NUCLEATED RBC %: 0 %
O2 SAT, VEN: 90.3 % (ref 50–70)
PCO2, VEN: 48 MMHG (ref 38–52)
PDW BLD-RTO: 13.5 % (ref 11.7–14.9)
PH VENOUS: 7.24 (ref 7.32–7.42)
PLATELET # BLD: 208 K/CU MM (ref 140–440)
PMV BLD AUTO: 10.3 FL (ref 7.5–11.1)
PO2, VEN: 72 MMHG (ref 28–48)
POTASSIUM SERPL-SCNC: 5.2 MMOL/L (ref 3.5–5.1)
PRO-BNP: ABNORMAL PG/ML
RAPID INFLUENZA  B AGN: NEGATIVE
RAPID INFLUENZA A AGN: NEGATIVE
RBC # BLD: 3.13 M/CU MM (ref 4.6–6.2)
SEGMENTED NEUTROPHILS ABSOLUTE COUNT: 5.9 K/CU MM
SEGMENTED NEUTROPHILS RELATIVE PERCENT: 73.3 % (ref 36–66)
SODIUM BLD-SCNC: 136 MMOL/L (ref 135–145)
TOTAL IMMATURE NEUTOROPHIL: 0.03 K/CU MM
TOTAL NUCLEATED RBC: 0 K/CU MM
TOTAL PROTEIN: 6.9 GM/DL (ref 6.4–8.2)
TROPONIN T: 0.18 NG/ML
WBC # BLD: 8 K/CU MM (ref 4–10.5)

## 2023-02-07 PROCEDURE — 83880 ASSAY OF NATRIURETIC PEPTIDE: CPT

## 2023-02-07 PROCEDURE — 87804 INFLUENZA ASSAY W/OPTIC: CPT

## 2023-02-07 PROCEDURE — 87635 SARS-COV-2 COVID-19 AMP PRB: CPT

## 2023-02-07 PROCEDURE — 2140000000 HC CCU INTERMEDIATE R&B

## 2023-02-07 PROCEDURE — 84484 ASSAY OF TROPONIN QUANT: CPT

## 2023-02-07 PROCEDURE — 71045 X-RAY EXAM CHEST 1 VIEW: CPT

## 2023-02-07 PROCEDURE — 85025 COMPLETE CBC W/AUTO DIFF WBC: CPT

## 2023-02-07 PROCEDURE — 93005 ELECTROCARDIOGRAM TRACING: CPT | Performed by: STUDENT IN AN ORGANIZED HEALTH CARE EDUCATION/TRAINING PROGRAM

## 2023-02-07 PROCEDURE — 99285 EMERGENCY DEPT VISIT HI MDM: CPT

## 2023-02-07 PROCEDURE — 6360000002 HC RX W HCPCS: Performed by: STUDENT IN AN ORGANIZED HEALTH CARE EDUCATION/TRAINING PROGRAM

## 2023-02-07 PROCEDURE — 82805 BLOOD GASES W/O2 SATURATION: CPT

## 2023-02-07 PROCEDURE — 80053 COMPREHEN METABOLIC PANEL: CPT

## 2023-02-07 RX ORDER — CALCIUM GLUCONATE 20 MG/ML
2000 INJECTION, SOLUTION INTRAVENOUS ONCE
Status: COMPLETED | OUTPATIENT
Start: 2023-02-07 | End: 2023-02-08

## 2023-02-07 RX ORDER — MAGNESIUM SULFATE 1 G/100ML
1000 INJECTION INTRAVENOUS ONCE
Status: COMPLETED | OUTPATIENT
Start: 2023-02-07 | End: 2023-02-08

## 2023-02-07 RX ORDER — FUROSEMIDE 10 MG/ML
60 INJECTION INTRAMUSCULAR; INTRAVENOUS ONCE
Status: COMPLETED | OUTPATIENT
Start: 2023-02-07 | End: 2023-02-07

## 2023-02-07 RX ADMIN — MAGNESIUM SULFATE IN DEXTROSE 1000 MG: 10 INJECTION, SOLUTION INTRAVENOUS at 23:07

## 2023-02-07 RX ADMIN — FUROSEMIDE 60 MG: 10 INJECTION, SOLUTION INTRAVENOUS at 23:02

## 2023-02-07 RX ADMIN — CALCIUM GLUCONATE 2000 MG: 20 INJECTION, SOLUTION INTRAVENOUS at 23:07

## 2023-02-07 ASSESSMENT — PAIN DESCRIPTION - LOCATION: LOCATION: FOOT

## 2023-02-07 ASSESSMENT — PAIN SCALES - GENERAL: PAINLEVEL_OUTOF10: 7

## 2023-02-08 LAB
ANION GAP SERPL CALCULATED.3IONS-SCNC: 21 MMOL/L (ref 4–16)
BUN SERPL-MCNC: 96 MG/DL (ref 6–23)
CALCIUM SERPL-MCNC: 6.8 MG/DL (ref 8.3–10.6)
CHLORIDE BLD-SCNC: 98 MMOL/L (ref 99–110)
CO2: 19 MMOL/L (ref 21–32)
CREAT SERPL-MCNC: 11.2 MG/DL (ref 0.9–1.3)
EKG ATRIAL RATE: 87 BPM
EKG DIAGNOSIS: NORMAL
EKG P AXIS: 35 DEGREES
EKG P-R INTERVAL: 176 MS
EKG Q-T INTERVAL: 394 MS
EKG QRS DURATION: 80 MS
EKG QTC CALCULATION (BAZETT): 474 MS
EKG R AXIS: 44 DEGREES
EKG T AXIS: 117 DEGREES
EKG VENTRICULAR RATE: 87 BPM
GFR SERPL CREATININE-BSD FRML MDRD: 5 ML/MIN/1.73M2
GLUCOSE BLD-MCNC: 127 MG/DL (ref 70–99)
GLUCOSE BLD-MCNC: 132 MG/DL (ref 70–99)
GLUCOSE BLD-MCNC: 204 MG/DL (ref 70–99)
GLUCOSE BLD-MCNC: 233 MG/DL (ref 70–99)
GLUCOSE SERPL-MCNC: 126 MG/DL (ref 70–99)
POTASSIUM SERPL-SCNC: 5.4 MMOL/L (ref 3.5–5.1)
SARS-COV-2 RDRP RESP QL NAA+PROBE: NOT DETECTED
SODIUM BLD-SCNC: 138 MMOL/L (ref 135–145)
SOURCE: NORMAL
TROPONIN T: 0.18 NG/ML

## 2023-02-08 PROCEDURE — 99211 OFF/OP EST MAY X REQ PHY/QHP: CPT

## 2023-02-08 PROCEDURE — 80048 BASIC METABOLIC PNL TOTAL CA: CPT

## 2023-02-08 PROCEDURE — 6370000000 HC RX 637 (ALT 250 FOR IP): Performed by: INTERNAL MEDICINE

## 2023-02-08 PROCEDURE — 93010 ELECTROCARDIOGRAM REPORT: CPT | Performed by: INTERNAL MEDICINE

## 2023-02-08 PROCEDURE — 2580000003 HC RX 258: Performed by: INTERNAL MEDICINE

## 2023-02-08 PROCEDURE — 82962 GLUCOSE BLOOD TEST: CPT

## 2023-02-08 PROCEDURE — 6360000002 HC RX W HCPCS: Performed by: INTERNAL MEDICINE

## 2023-02-08 PROCEDURE — 94660 CPAP INITIATION&MGMT: CPT

## 2023-02-08 PROCEDURE — 1200000000 HC SEMI PRIVATE

## 2023-02-08 PROCEDURE — 84484 ASSAY OF TROPONIN QUANT: CPT

## 2023-02-08 PROCEDURE — 36415 COLL VENOUS BLD VENIPUNCTURE: CPT

## 2023-02-08 PROCEDURE — 90935 HEMODIALYSIS ONE EVALUATION: CPT

## 2023-02-08 PROCEDURE — 94761 N-INVAS EAR/PLS OXIMETRY MLT: CPT

## 2023-02-08 PROCEDURE — 5A1D70Z PERFORMANCE OF URINARY FILTRATION, INTERMITTENT, LESS THAN 6 HOURS PER DAY: ICD-10-PCS | Performed by: INTERNAL MEDICINE

## 2023-02-08 RX ORDER — SODIUM CHLORIDE 0.9 % (FLUSH) 0.9 %
5-40 SYRINGE (ML) INJECTION PRN
Status: DISCONTINUED | OUTPATIENT
Start: 2023-02-08 | End: 2023-02-10 | Stop reason: HOSPADM

## 2023-02-08 RX ORDER — HEPARIN SODIUM 5000 [USP'U]/ML
5000 INJECTION, SOLUTION INTRAVENOUS; SUBCUTANEOUS EVERY 8 HOURS SCHEDULED
Status: DISCONTINUED | OUTPATIENT
Start: 2023-02-08 | End: 2023-02-10 | Stop reason: HOSPADM

## 2023-02-08 RX ORDER — ARIPIPRAZOLE 5 MG/1
5 TABLET ORAL NIGHTLY
Status: DISCONTINUED | OUTPATIENT
Start: 2023-02-08 | End: 2023-02-10 | Stop reason: HOSPADM

## 2023-02-08 RX ORDER — CLOPIDOGREL BISULFATE 75 MG/1
75 TABLET ORAL DAILY
Status: DISCONTINUED | OUTPATIENT
Start: 2023-02-08 | End: 2023-02-10 | Stop reason: HOSPADM

## 2023-02-08 RX ORDER — CALCITRIOL 0.25 UG/1
0.25 CAPSULE, LIQUID FILLED ORAL DAILY
Status: DISCONTINUED | OUTPATIENT
Start: 2023-02-08 | End: 2023-02-08

## 2023-02-08 RX ORDER — SEVELAMER CARBONATE 800 MG/1
800 TABLET, FILM COATED ORAL
Status: DISCONTINUED | OUTPATIENT
Start: 2023-02-08 | End: 2023-02-10 | Stop reason: HOSPADM

## 2023-02-08 RX ORDER — ATORVASTATIN CALCIUM 40 MG/1
40 TABLET, FILM COATED ORAL NIGHTLY
Status: DISCONTINUED | OUTPATIENT
Start: 2023-02-08 | End: 2023-02-10 | Stop reason: HOSPADM

## 2023-02-08 RX ORDER — AMLODIPINE BESYLATE 10 MG/1
10 TABLET ORAL DAILY
Status: DISCONTINUED | OUTPATIENT
Start: 2023-02-08 | End: 2023-02-10 | Stop reason: HOSPADM

## 2023-02-08 RX ORDER — INSULIN GLARGINE 100 [IU]/ML
45 INJECTION, SOLUTION SUBCUTANEOUS NIGHTLY
Status: DISCONTINUED | OUTPATIENT
Start: 2023-02-08 | End: 2023-02-08

## 2023-02-08 RX ORDER — DEXTROSE MONOHYDRATE 100 MG/ML
INJECTION, SOLUTION INTRAVENOUS CONTINUOUS PRN
Status: DISCONTINUED | OUTPATIENT
Start: 2023-02-08 | End: 2023-02-10 | Stop reason: HOSPADM

## 2023-02-08 RX ORDER — INSULIN LISPRO 100 [IU]/ML
0-8 INJECTION, SOLUTION INTRAVENOUS; SUBCUTANEOUS
Status: DISCONTINUED | OUTPATIENT
Start: 2023-02-08 | End: 2023-02-10 | Stop reason: HOSPADM

## 2023-02-08 RX ORDER — INSULIN GLARGINE 100 [IU]/ML
35 INJECTION, SOLUTION SUBCUTANEOUS NIGHTLY
Status: DISCONTINUED | OUTPATIENT
Start: 2023-02-08 | End: 2023-02-10 | Stop reason: HOSPADM

## 2023-02-08 RX ORDER — SODIUM CHLORIDE 9 MG/ML
INJECTION, SOLUTION INTRAVENOUS PRN
Status: DISCONTINUED | OUTPATIENT
Start: 2023-02-08 | End: 2023-02-10 | Stop reason: HOSPADM

## 2023-02-08 RX ORDER — CARVEDILOL 6.25 MG/1
12.5 TABLET ORAL 2 TIMES DAILY WITH MEALS
Status: DISCONTINUED | OUTPATIENT
Start: 2023-02-08 | End: 2023-02-10 | Stop reason: HOSPADM

## 2023-02-08 RX ORDER — TRAZODONE HYDROCHLORIDE 50 MG/1
50 TABLET ORAL NIGHTLY PRN
Status: DISCONTINUED | OUTPATIENT
Start: 2023-02-08 | End: 2023-02-10 | Stop reason: HOSPADM

## 2023-02-08 RX ORDER — OXYCODONE HYDROCHLORIDE AND ACETAMINOPHEN 5; 325 MG/1; MG/1
1 TABLET ORAL EVERY 8 HOURS PRN
Status: DISCONTINUED | OUTPATIENT
Start: 2023-02-08 | End: 2023-02-10 | Stop reason: HOSPADM

## 2023-02-08 RX ORDER — PANTOPRAZOLE SODIUM 40 MG/1
40 TABLET, DELAYED RELEASE ORAL
Status: DISCONTINUED | OUTPATIENT
Start: 2023-02-08 | End: 2023-02-10 | Stop reason: HOSPADM

## 2023-02-08 RX ORDER — ONDANSETRON 4 MG/1
4 TABLET, ORALLY DISINTEGRATING ORAL EVERY 8 HOURS PRN
Status: DISCONTINUED | OUTPATIENT
Start: 2023-02-08 | End: 2023-02-10 | Stop reason: HOSPADM

## 2023-02-08 RX ORDER — CALCITRIOL 0.25 UG/1
0.5 CAPSULE, LIQUID FILLED ORAL 2 TIMES DAILY
Status: DISCONTINUED | OUTPATIENT
Start: 2023-02-08 | End: 2023-02-10 | Stop reason: HOSPADM

## 2023-02-08 RX ORDER — INSULIN LISPRO 100 [IU]/ML
12 INJECTION, SOLUTION INTRAVENOUS; SUBCUTANEOUS
Status: DISCONTINUED | OUTPATIENT
Start: 2023-02-08 | End: 2023-02-10 | Stop reason: HOSPADM

## 2023-02-08 RX ORDER — INSULIN LISPRO 100 [IU]/ML
0-4 INJECTION, SOLUTION INTRAVENOUS; SUBCUTANEOUS NIGHTLY
Status: DISCONTINUED | OUTPATIENT
Start: 2023-02-08 | End: 2023-02-10 | Stop reason: HOSPADM

## 2023-02-08 RX ORDER — RANOLAZINE 500 MG/1
500 TABLET, EXTENDED RELEASE ORAL 2 TIMES DAILY
Status: DISCONTINUED | OUTPATIENT
Start: 2023-02-08 | End: 2023-02-10 | Stop reason: HOSPADM

## 2023-02-08 RX ORDER — ACETAMINOPHEN 650 MG/1
650 SUPPOSITORY RECTAL EVERY 6 HOURS PRN
Status: DISCONTINUED | OUTPATIENT
Start: 2023-02-08 | End: 2023-02-10 | Stop reason: HOSPADM

## 2023-02-08 RX ORDER — ONDANSETRON 2 MG/ML
4 INJECTION INTRAMUSCULAR; INTRAVENOUS EVERY 6 HOURS PRN
Status: DISCONTINUED | OUTPATIENT
Start: 2023-02-08 | End: 2023-02-10 | Stop reason: HOSPADM

## 2023-02-08 RX ORDER — POLYETHYLENE GLYCOL 3350 17 G/17G
17 POWDER, FOR SOLUTION ORAL DAILY PRN
Status: DISCONTINUED | OUTPATIENT
Start: 2023-02-08 | End: 2023-02-10 | Stop reason: HOSPADM

## 2023-02-08 RX ORDER — SODIUM CHLORIDE 0.9 % (FLUSH) 0.9 %
5-40 SYRINGE (ML) INJECTION EVERY 12 HOURS SCHEDULED
Status: DISCONTINUED | OUTPATIENT
Start: 2023-02-08 | End: 2023-02-10 | Stop reason: HOSPADM

## 2023-02-08 RX ORDER — ACETAMINOPHEN 325 MG/1
650 TABLET ORAL EVERY 6 HOURS PRN
Status: DISCONTINUED | OUTPATIENT
Start: 2023-02-08 | End: 2023-02-10 | Stop reason: HOSPADM

## 2023-02-08 RX ADMIN — CARVEDILOL 12.5 MG: 6.25 TABLET, FILM COATED ORAL at 11:37

## 2023-02-08 RX ADMIN — INSULIN LISPRO 12 UNITS: 100 INJECTION, SOLUTION INTRAVENOUS; SUBCUTANEOUS at 17:38

## 2023-02-08 RX ADMIN — PANTOPRAZOLE SODIUM 40 MG: 40 TABLET, DELAYED RELEASE ORAL at 05:23

## 2023-02-08 RX ADMIN — RANOLAZINE 500 MG: 500 TABLET, EXTENDED RELEASE ORAL at 11:37

## 2023-02-08 RX ADMIN — HEPARIN SODIUM 5000 UNITS: 5000 INJECTION INTRAVENOUS; SUBCUTANEOUS at 22:30

## 2023-02-08 RX ADMIN — SODIUM CHLORIDE, PRESERVATIVE FREE 10 ML: 5 INJECTION INTRAVENOUS at 11:23

## 2023-02-08 RX ADMIN — CALCITRIOL CAPSULES 0.25 MCG 0.5 MCG: 0.25 CAPSULE ORAL at 22:29

## 2023-02-08 RX ADMIN — CLOPIDOGREL BISULFATE 75 MG: 75 TABLET ORAL at 11:37

## 2023-02-08 RX ADMIN — SEVELAMER CARBONATE 800 MG: 800 TABLET, FILM COATED ORAL at 17:31

## 2023-02-08 RX ADMIN — PANTOPRAZOLE SODIUM 40 MG: 40 TABLET, DELAYED RELEASE ORAL at 15:54

## 2023-02-08 RX ADMIN — CALCITRIOL CAPSULES 0.25 MCG 0.5 MCG: 0.25 CAPSULE ORAL at 11:37

## 2023-02-08 RX ADMIN — RANOLAZINE 500 MG: 500 TABLET, EXTENDED RELEASE ORAL at 22:29

## 2023-02-08 RX ADMIN — CARVEDILOL 12.5 MG: 6.25 TABLET, FILM COATED ORAL at 17:31

## 2023-02-08 RX ADMIN — INSULIN GLARGINE 35 UNITS: 100 INJECTION, SOLUTION SUBCUTANEOUS at 22:29

## 2023-02-08 RX ADMIN — OXYCODONE AND ACETAMINOPHEN 1 TABLET: 5; 325 TABLET ORAL at 04:12

## 2023-02-08 RX ADMIN — ARIPIPRAZOLE 5 MG: 5 TABLET ORAL at 22:29

## 2023-02-08 RX ADMIN — OXYCODONE AND ACETAMINOPHEN 1 TABLET: 5; 325 TABLET ORAL at 22:36

## 2023-02-08 RX ADMIN — OXYCODONE AND ACETAMINOPHEN 1 TABLET: 5; 325 TABLET ORAL at 11:36

## 2023-02-08 RX ADMIN — ATORVASTATIN CALCIUM 40 MG: 40 TABLET, FILM COATED ORAL at 22:29

## 2023-02-08 RX ADMIN — INSULIN LISPRO 2 UNITS: 100 INJECTION, SOLUTION INTRAVENOUS; SUBCUTANEOUS at 17:38

## 2023-02-08 RX ADMIN — HEPARIN SODIUM 5000 UNITS: 5000 INJECTION INTRAVENOUS; SUBCUTANEOUS at 14:21

## 2023-02-08 RX ADMIN — SODIUM CHLORIDE, PRESERVATIVE FREE 10 ML: 5 INJECTION INTRAVENOUS at 22:30

## 2023-02-08 RX ADMIN — SEVELAMER CARBONATE 800 MG: 800 TABLET, FILM COATED ORAL at 11:38

## 2023-02-08 RX ADMIN — AMLODIPINE BESYLATE 10 MG: 10 TABLET ORAL at 11:38

## 2023-02-08 RX ADMIN — HEPARIN SODIUM 5000 UNITS: 5000 INJECTION INTRAVENOUS; SUBCUTANEOUS at 05:23

## 2023-02-08 ASSESSMENT — PAIN SCALES - GENERAL
PAINLEVEL_OUTOF10: 6
PAINLEVEL_OUTOF10: 7
PAINLEVEL_OUTOF10: 0
PAINLEVEL_OUTOF10: 7

## 2023-02-08 ASSESSMENT — ENCOUNTER SYMPTOMS
BACK PAIN: 0
SHORTNESS OF BREATH: 1
ABDOMINAL PAIN: 0
DIARRHEA: 0
ABDOMINAL DISTENTION: 0
PHOTOPHOBIA: 0
CONSTIPATION: 0
VOICE CHANGE: 0
TROUBLE SWALLOWING: 0
SORE THROAT: 0
RHINORRHEA: 0
SINUS PAIN: 0
NAUSEA: 0
CHEST TIGHTNESS: 0
EYE PAIN: 0
EYE ITCHING: 0
EYE DISCHARGE: 0
VOMITING: 0
COUGH: 0
EYE REDNESS: 0
BLOOD IN STOOL: 0

## 2023-02-08 ASSESSMENT — PAIN SCALES - WONG BAKER
WONGBAKER_NUMERICALRESPONSE: 0
WONGBAKER_NUMERICALRESPONSE: 0

## 2023-02-08 ASSESSMENT — PAIN DESCRIPTION - FREQUENCY
FREQUENCY: CONTINUOUS
FREQUENCY: CONTINUOUS

## 2023-02-08 ASSESSMENT — PAIN DESCRIPTION - LOCATION
LOCATION: FOOT

## 2023-02-08 ASSESSMENT — PAIN DESCRIPTION - PAIN TYPE
TYPE: CHRONIC PAIN
TYPE: CHRONIC PAIN

## 2023-02-08 ASSESSMENT — PAIN DESCRIPTION - ORIENTATION
ORIENTATION: LEFT

## 2023-02-08 ASSESSMENT — PAIN - FUNCTIONAL ASSESSMENT
PAIN_FUNCTIONAL_ASSESSMENT: ACTIVITIES ARE NOT PREVENTED

## 2023-02-08 ASSESSMENT — PAIN DESCRIPTION - DESCRIPTORS
DESCRIPTORS: ACHING
DESCRIPTORS: ACHING;DISCOMFORT
DESCRIPTORS: SHARP

## 2023-02-08 ASSESSMENT — PAIN DESCRIPTION - ONSET
ONSET: ON-GOING
ONSET: ON-GOING

## 2023-02-08 NOTE — ED NOTES
This RN notified of pt desat to low 70s. Upon assessment pt was asleep. Pt was woken up and 02 reading back to mid 90s. Pt placed on 3 L NC at this time. Will continue to monitor. Attending [facility] notified via [employer].       Nathaly Cordoba RN  02/08/23 0453

## 2023-02-08 NOTE — CONSULTS
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  Left foot diabetic ulcer  ESRD on HD MWF  HTN  DM2  CAD/CHF    Suggest:  Continue resp support  HD planned today  Replete calcium , resume calcitriol  Will follow    Thank you          Patient Active Problem List    Diagnosis Date Noted    Fluid overload 02/07/2023    Problem with vascular access 11/26/2022    ESRD (end stage renal disease) ([address] 09/12/2022    WD - Ulcer of forefoot due to type 2 diabetes mellitus ([address] 09/01/2022    NSTEMI (non-ST elevated myocardial infarction) ([address] 08/24/2022    Chest tightness 08/22/2022    CARMEN (acute kidney injury) ([address] 07/25/2022    Anemia 07/25/2022    Recurrent major depressive disorder, in full remission ([address] 05/18/2021    Generalized anxiety disorder 05/18/2021    Surgical wound dehiscence 02/08/2022    Chest pain 12/21/2021    Other proteinuria     FH: CAD (coronary artery disease) 09/29/2021    ASCVD (arteriosclerotic cardiovascular disease) 09/29/2021    Chronic kidney disease, stage IV (severe) ([address] 09/28/2021    Nephrotic syndrome 09/22/2021    Generalized edema 09/22/2021    Stage 3b chronic kidney disease ([address] 09/22/2021    Diabetic nephropathy associated with type 2 diabetes mellitus ([address] 09/22/2021    Hypoalbuminemia 09/22/2021    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 type 2 diabetes mellitus, with muscle involvement without evidence of necrosis ([address] 09/21/2021    Right groin wound 06/09/2021    Ulcer of right groin with fat layer exposed ([address]     Syncope 06/05/2021    Necrotizing fasciitis ([address] 05/24/2021    Morbid obesity with body mass index (BMI) of 40.0 to 44.9 in adult [facility]) 05/21/2021    Irene gangrene     Cellulitis, scrotum 05/13/2021    Acute epididymitis     Cellulitis of left arm 04/03/2021    Cellulitis 03/23/2021    Acute renal failure (ARF) ([address] 08/04/2019    Diabetic foot infection ([address] 03/21/2019    Intractable nausea and vomiting 01/11/2019    Uncontrolled type 2 diabetes mellitus 01/11/2019    Nausea and vomiting 01/11/2019    Constipation 10/07/2018    Cellulitis of left foot     Sepsis ([address] 07/19/2128    Periumbilical hernia     Abscess 12/03/2017    Osteomyelitis ([address] 05/22/2015    Bronchitis 10/15/2013    Hyperlipidemia with target LDL less than 100 07/15/2013    Essential hypertension 07/15/2013    Bilateral carpal tunnel syndrome- left worse than right 06/24/2013    DANIELA (obstructive sleep apnea) 06/20/2013    Urinary frequency 06/20/2013    Neck pain 05/16/2013    Anxiety associated with depression     Panic attack 02/01/2012    Diabetic neuropathy ([address] 12/22/2011    Hiatal hernia 02/04/2011    Diabetes mellitus type 2, improved controlled 02/04/2011         Patient:  Gretel Kocher  MRN: [id number]  Consulting physician:  Indira Ehcevarria MD  Reason for Consult: office pt  PCP: Milvia Hoang MD    HISTORY OF PRESENT ILLNESS:   The patient is a 52 y.o. 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  See positive ROS per HPI    Past Medical History:        Diagnosis Date    Abscess 2010    scrotal    Acute renal failure (ARF) ([address] 08/04/2019    Anxiety associated with depression     Anxiety associated with depression     Back pain 07/02/2012    Chest pain 05/01/2013    Diabetic nephropathy ([address]     Diabetic neuropathy ([address] 12/22/2011    Diabetic ulcer of left midfoot associated with type 2 diabetes mellitus, with fat layer exposed ([address] 07/18/2017    Diverticulosis     C scope + Dr. Patricio Rehman    DM (diabetes mellitus), type 2 ([address] 2002    DR. 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 Andrés Oneil    Necrotizing fasciitis [facility])     Nose fracture 1988    Panic attacks     Pericarditis 2003    Hospitalized with s/p heart cath normal    Peripheral autonomic neuropathy due to diabetes mellitus ([address]     Axonal EMG- NCS, March 2011    Sebaceous cyst 09/01/2011    URI (upper respiratory infection) 02/27/2012    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 type 2 diabetes mellitus, with muscle involvement without evidence of necrosis ([address] 9/21/2021    WD-Wound, surgical, infected, initial encounter 12/08/2017    Wrist fracture 1986       Past Surgical History:        Procedure Laterality Date    [address]  2003, 2013    Normal (Dr. Екатерина Farfan)    COLONOSCOPY  06/02/2011    Pandiverticulosis, Nonbleeding internal hemorrhoids, Repeat colonoscopy at age 48- Dr. Janis Corey      x 3    FOOT SURGERY Left 12/21/2021    EXCISION OF HEAD LEFT 2ND METATARSAL performed by Aime Grijalva DPM at [address] NONTUNNELED VASCULAR CATHETER  07/25/2022    IR NONTUNNELED VASCULAR CATHETER 7/25/2022 [facility] SPECIAL PROCEDURES    IR TUNNELED CATHETER PLACEMENT GREATER THAN 5 YEARS  07/27/2022    IR TUNNELED CATHETER PLACEMENT GREATER THAN 5 YEARS 7/27/2022 [facility] SPECIAL PROCEDURES    IR TUNNELED CATHETER PLACEMENT GREATER THAN 5 YEARS  11/28/2022    IR TUNNELED CATHETER PLACEMENT GREATER THAN 5 YEARS 11/28/2022 [address]    NOSE SURGERY  1993    \"had reconstruction surgery on nose a year after the other nose surgery\"    OTHER SURGICAL HISTORY Right 05/22/2015    I & D right great toe with partial amputation    OTHER SURGICAL HISTORY  12/04/2017    inc and drainage of abcess    MA INCISION BONE CORTEX FOOT Left 09/22/2018    LEFT FOOT DEBRIDEMENT INCISION AND DRAINAGE TOP AND BOTTOM performed by Rosalind Rodriguez DPM at [facility] N/A 05/15/2021    SCROTAL AND PERINEAL DEBRIDEMENT performed by Jaiden Devries MD at [place] [address] 05/16/2021    SCROTAL RE-DEBRIDEMENT  INCISION AND DRAINAGE performed by Jaiden Devries MD at [address] N/A 06/18/2013    sebaceous cyst removal times 4     TOE AMPUTATION      right great toe    TOE AMPUTATION Right 03/23/2019    TOE AMPUTATION RIGHT 5TH TOE performed by Rosalind Rodriguez DPM at [address] Right 08/06/2019    TOE AMPUTATION RIGHT 4TH TOE [facility] [address] [facility]  06/02/2011    Mild gastritis with moderatley severe antritis, small hiatal hernia, Dr. Octavio Ashley N/A 01/12/2019    EGD BIOPSY performed by Memo Dean MD at [place] [address] N/A 07/26/2022    EGD DIAGNOSTIC ONLY performed by Memo Dean MD at [facility] ENDOSCOPY       Medications:   Prior to Admission medications    Medication Sig Start Date End Date Taking?  Authorizing Provider   traZODone (DESYREL) 50 MG tablet Take 1 tablet by mouth nightly as needed for Sleep 11/29/22   Mary Yeboah MD   atorvastatin (LIPITOR) 40 MG tablet Take 1 tablet by mouth nightly 9/14/22   Duke Colmenares MD   carvedilol (COREG) 12.5 MG tablet Take 1 tablet by mouth 2 times daily (with meals) 9/14/22   Duke Colmenares MD   epoetin paola-epbx (RETACRIT) 37050 UNIT/ML SOLN injection Inject 1 mL into the skin three times a week To be given in HD by nurse 9/16/22   Duke Colmenares MD   amLODIPine (NORVASC) 10 MG tablet Take 1 tablet by mouth daily 9/14/22   Duke Colmenares MD   sevelamer (RENVELA) 800 MG tablet Take 800 mg by mouth 3 times daily (with meals) 8/9/22   Historical Provider, MD   ranolazine (RANEXA) 500 MG extended release tablet Take 1 tablet by mouth 2 times daily 9/6/22   Scott Olivares MD   aspirin 81 MG EC tablet Take 1 tablet by mouth daily 8/25/22   Frannie Medina MD   clopidogrel (PLAVIX) 75 MG tablet Take 1 tablet by mouth daily 8/25/22   Frannie Medina MD   metoprolol tartrate (LOPRESSOR) 25 MG tablet Take 0.5 tablets by mouth 2 times daily 8/25/22 9/14/22  Frannie Medina MD   oxyCODONE-acetaminophen (PERCOCET) 5-325 MG per tablet Take 1.5 tablets by mouth daily. Historical Provider, MD   alogliptin (NESINA) 6.25 MG TABS tablet Take 1 tablet by mouth in the morning. 8/4/22   Malik Carey MD   insulin lispro, 1 Unit Dial, (HUMALOG KWIKPEN) 100 UNIT/ML SOPN Inject 20 Units into the skin in the morning and 20 Units at noon and 20 Units in the evening. Inject before meals. 8/3/22   Malik Carey MD   insulin glargine (LANTUS SOLOSTAR) 100 UNIT/ML injection pen Inject 45 Units into the skin nightly 8/3/22   Malik Carey MD   ARIPiprazole (ABILIFY) 5 MG tablet Take 1 tablet by mouth at bedtime 8/3/22   Malik Carey MD   pantoprazole (PROTONIX) 40 MG tablet Take 1 tablet by mouth in the morning and 1 tablet in the evening. Take before meals. 8/3/22   Malik Carey MD   calcitRIOL (ROCALTROL) 0.25 MCG capsule Take 1 capsule by mouth in the morning.  8/4/22   Malik Carey MD   furosemide (LASIX) 40 MG tablet Take 1 tablet by mouth daily 9/22/21 8/3/22  Rhonda Spears MD   insulin aspart (NOVOLOG) 100 UNIT/ML injection vial Inject 35 Units into the skin 3 times daily (before meals)  8/3/22  Historical Provider, MD   chlorthalidone (HYGROTON) 25 MG tablet Take 1 tablet by mouth daily 6/15/21 8/3/22  Homero Marin MD   linagliptin (TRADJENTA) 5 MG tablet Take 1 tablet by mouth daily 3/29/21 8/3/22  Homero Marin MD   Lancets MISC 1 each by Does not apply route daily 9/21/18   Himanshu Singleton MD   glucose monitoring kit (FREESTYLE) monitoring kit 1 each by Does not apply route once for 1 dose. 6/20/13 6/20/13  Tiffanie Morales MD        Allergies:  Adhesive tape, Doxycycline, and Reglan [metoclopramide]    Social History:   TOBACCO:   reports that he quit smoking about 7 months ago. His smoking use included cigarettes. He has never used smokeless tobacco.  ETOH:   reports that he does not currently use alcohol.   OCCUPATION:      Family History:       Problem Relation Age of Onset    Cancer Mother         ?site    Stroke Mother     Bleeding Prob Mother     Diabetes Father     Heart Disease Father     High Blood Pressure Father     Obesity Father     Kidney Disease Father     Diabetes Sister     High Blood Pressure Sister     Mental Illness Sister     Obesity Sister     High Blood Pressure Other     Mental Illness Other         bipolar    Other Son         cyst in ear canal    ADHD Daughter      Physical Exam:    Vitals: BP (!) 167/86   Pulse 84   Temp 97.2 °F (36.2 °C) (Oral)   Resp 16   Ht 5' 9\" (1.753 m)   Wt 290 lb (131.5 kg)   SpO2 98%   BMI 42.83 kg/m²   General appearance: in no acute distress, appears stated age  Skin: Skin color, texture, turgor normal. No rashes or lesions  HEENT: normocephalic, atraumatic  Neck: supple, trachea midline  Lungs:  breathing comfortably on cpap  Heart[de-identified] regular rate and rhythm, S1, S2 normal,  Abdomen: non distended  Extremities: trace ble edema  Neurologic: Mental status: alert, oriented, interactive, following commands  Psychiatric: mood and affect appropriate     CBC:   Recent Labs     02/07/23 2117   WBC 8.0   HGB 9.1*        BMP:    Recent Labs     02/07/23 2117      K 5.2*   CL 99   CO2 19*   BUN 92*   CREATININE 9.9*   GLUCOSE 208*     Hepatic:   Recent Labs     02/07/23 2117   AST 12*   ALT 8*   BILITOT 0.2   ALKPHOS 68              Electronically signed by Lorraine Berry DO on 2/8/2023 at 6:33 AM    [facility]  MD SAMANTHA Da Silva DO  [address]  [facility], [place] [address]  PHONE: [phone]  FAX: [phone]

## 2023-02-08 NOTE — CARE COORDINATION
.Case Management Assessment  Initial Evaluation    Date/Time of Evaluation: 2/8/2023 11:52 AM  Assessment Completed by: Juana Goncalves RN    If patient is discharged prior to next notation, then this note serves as note for discharge by case management. Patient Name: Bettyjo Closs                   YOB: 1975  Diagnosis: Hypocalcemia [E83.51]  Uremia [N19]  Hypoxia [R09.02]  Fluid overload [E87.70]  High anion gap metabolic acidosis [Z57.07]  Dyspnea, unspecified type [R06.00]                   Date / Time: 2/7/2023  8:33 PM    Patient Admission Status: Inpatient   Readmission Risk (Low < 19, Mod (19-27), High > 27): Readmission Risk Score: 23.5    Current PCP: Nithya Durham MD  PCP verified by CM? Yes    Chart Reviewed: Yes      History Provided by: Medical Record  Patient Orientation: Alert and Oriented    Patient Cognition: Alert    Hospitalization in the last 30 days (Readmission):  No    If yes, Readmission Assessment in  Navigator will be completed. Advance Directives:      Code Status: Full Code   Patient's Primary Decision Maker is:  (Self)    Primary Decision MakeKemarcheri Kingsley - Spouse - [phone]    Discharge Planning:    Patient lives with: Spouse/Significant Other, Children, Family Members Type of Home: House  Primary Care Giver: Self  Patient Support Systems include: Spouse/Significant Other   Current Financial resources: Medicare  Current community resources: None  Current services prior to admission: None            Current DME:              Type of Home Care services:  None    ADLS  Prior functional level: Independent in ADLs/IADLs  Current functional level: Independent in ADLs/IADLs    PT AM-PAC:   /24  OT AM-PAC:   /24    Family can provide assistance at DC: Would you like Case Management to discuss the discharge plan with any other family members/significant others, and if so, who?     Plans to Return to Present Housing: Yes  Other Identified Issues/Barriers to RETURNING to current housing: none    Potential Assistance needed at discharge: N/A            Potential DME:    Patient expects to discharge to: [facility] for transportation at discharge:      Financial    Payor: Ivan Adan / Plan: Lisa Lopes / Product Type: *No Product type* /     Does insurance require precert for SNF: Yes    Potential assistance Purchasing Medications:    [facility] [id number] - [address] [address] [address] [phone]  [address] [facility]  Phone: [phone] Fax: [phone]    [address] [facility], [address] [facility]  [address] [place] [address] [address] [facility]  Phone: [phone] Fax: [phone]    [facility], [address] [address] [address] [address]  Phone: [phone] Fax: [phone]      Notes:    Factors facilitating achievement of predicted outcomes: Family support    Barriers to discharge: Medical complications                    The Plan for Transition of Care is related to the following treatment goals of Hypocalcemia [E83.51]  Uremia [N19]  Hypoxia [R09.02]  Fluid overload [E87.70]  High anion gap metabolic acidosis [G46.01]  Dyspnea, unspecified type [X20.36]    IF APPLICABLE: The Patient and/or patient representative Lawerance Bernheim and his family were provided with a choice of provider and agrees with the discharge plan. Freedom of choice list with basic dialogue that supports the patient's individualized plan of care/goals and shares the quality data associated with the providers was provided to:     Patient Representative Name:       The Patient and/or Patient Representative Agree with the Discharge Plan?       Samara Aviles RN  Case Management Department

## 2023-02-08 NOTE — ED NOTES
ED TO INPATIENT SBAR HANDOFF    Patient Name: Alexandro Oswald   :    52 y.o. MRN:  [id number]  Preferred Name  [facility]  ED Room #:  ED18/ED-18  Family/Caregiver Present no   Restraints no   Sitter no   Sepsis Risk Score Sepsis Risk Score: 1.37    Situation  Code Status: Prior No additional code details. Allergies: Adhesive tape, Doxycycline, and Reglan [metoclopramide]  Weight:   Patient Vitals for the past 96 hrs (Last 3 readings):   Weight   23 290 lb (131.5 kg)     Arrived from: home  Chief Complaint:   Chief Complaint   Patient presents with    Shortness of [place] Problem/Diagnosis:  Principal Problem:    Fluid overload  Resolved Problems:    * No resolved hospital problems. *    Imaging:   XR CHEST PORTABLE   Final Result   Increased lung markings bilaterally, may be related to mild pulmonary   vascular congestion versus bronchitis.            Abnormal labs:   Abnormal Labs Reviewed   CBC WITH AUTO DIFFERENTIAL - Abnormal; Notable for the following components:       Result Value    RBC 3.13 (*)     Hemoglobin 9.1 (*)     Hematocrit 28.3 (*)     Segs Relative 73.3 (*)     Lymphocytes % 15.8 (*)     Monocytes % 7.6 (*)     All other components within normal limits   COMPREHENSIVE METABOLIC PANEL - Abnormal; Notable for the following components:    Potassium 5.2 (*)     CO2 19 (*)     BUN 92 (*)     Creatinine 9.9 (*)     Est, Glom Filt Rate 6 (*)     Glucose 208 (*)     Calcium 6.7 (*)     ALT 8 (*)     AST 12 (*)     Anion Gap 18 (*)     All other components within normal limits   TROPONIN - Abnormal; Notable for the following components:    Troponin T 0.183 (*)     All other components within normal limits   BRAIN NATRIURETIC PEPTIDE - Abnormal; Notable for the following components:    Pro-BNP 26,613 (*)     All other components within normal limits   BLOOD GAS, VENOUS - Abnormal; Notable for the following components:    pH, Néstor 7.24 (*)     pO2, Néstor 72 (*)     Base Excess 7 (*)     O2 Sat, Néstor 90.3 (*)     All other components within normal limits     Critical values: YES  TROPONIN 0.183  CALCIUM 6.7  Abnormal Assessment Findings: SHORTNESS OF BREATH    Background  History:   Past Medical History:   Diagnosis Date    Abscess 2010    scrotal    Acute renal failure (ARF) ([address] 08/04/2019    Anxiety associated with depression     Anxiety associated with depression     Back pain 07/02/2012    Chest pain 05/01/2013    Diabetic nephropathy ([address]     Diabetic neuropathy ([address] 12/22/2011    Diabetic ulcer of left midfoot associated with type 2 diabetes mellitus, with fat layer exposed ([address] 07/18/2017    Diverticulosis     C scope + Dr. Gurmeet White DM (diabetes mellitus), type 2 ([address] 2002    DR. Turner podiatry    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  [facility]O percutaneous left heart catheterization 09/30/2021    PCI procedure:DE Stent, LAD: DE Stent Plcmt Initl Vsl    Hyperlipidemia LDL goal < 100 07/15/2013    Hypertension     Internal hemorrhoid     C scope + Dr. Gurmeet White Necrotizing fasciitis [facility])     Nose fracture 1988    Panic attacks     Pericarditis 2003    Hospitalized with s/p heart cath normal    Peripheral autonomic neuropathy due to diabetes mellitus ([address]     Axonal EMG- NCS, March 2011    Sebaceous cyst 09/01/2011    URI (upper respiratory infection) 02/27/2012    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 type 2 diabetes mellitus, with muscle involvement without evidence of necrosis ([address] 9/21/2021    WD-Wound, surgical, infected, initial encounter 12/08/2017    Wrist fracture 1986       Assessment    Vitals/MEWS: MEWS Score: 1  Level of Consciousness: Alert (0)   Vitals:    02/07/23 2230 02/07/23 2300 02/07/23 2330 02/08/23 0000   BP: (!) 147/85 (!) 146/81 (!) 149/93 (!) 144/93   Pulse: 88 89 90 88   Resp: 18 18 19 17   Temp:  98 °F (36.7 °C)  97.9 °F (36.6 °C)   TempSrc:  Oral  Oral   SpO2: 95% 94% 93% 93%   Weight:       Height:         FiO2 (%): NA  O2 Flow Rate:      Cardiac Rhythm:   Pain Assessment: 7/10 [x] Verbal [] Markel Erm Scale  Pain Scale: Pain Assessment  Pain Assessment: 0-10  Pain Level: 7  Patient's Stated Pain Goal: 0 - No pain  Pain Location: Foot  Last documented pain score (0-10 scale) Pain Level: 7  Last documented pain medication administered: NA  Mental Status: oriented, alert, coherent, logical, and thought processes intact  NIH Score: NIH     C-SSRS: Risk of Suicide: No Risk  Bedside swallow:    Rosanna Coma Scale (GCS): Anderson Coma Scale  Eye Opening: Spontaneous  Best Verbal Response: Oriented  Best Motor Response: Obeys commands  Anderson Coma Scale Score: 15  Active LDA's:   Peripheral IV 02/07/23 Right Antecubital (Active)   Site Assessment Clean, dry & intact 02/07/23 2129   Line Status Blood return noted 02/07/23 2129   Phlebitis Assessment No symptoms 02/07/23 2129   Infiltration Assessment 0 02/07/23 2129     PO Status: Regular  Pertinent or High Risk Medications/Drips: no   o If Yes, please provide details: NA  Pending Blood Product Administration: no     You may also review the ED PT Care Timeline found under the Summary Nursing Index tab. Recommendation    Pending orders NA  Plan for Discharge (if known):    Additional Comments: NA   If any further questions, please call Sending RN at [phone]    Electronically signed by: Electronically signed by Rick Maxwell RN on 2/8/2023 at 12:05 AM      Rick Maxwell RN  02/07/23 [address], RN  02/07/23 [address], RN  02/07/23 8926       Rick Maxwell RN  02/08/23 0005

## 2023-02-08 NOTE — ED NOTES
The following labs were labeled with appropriate pt sticker and tubed to lab:     [x] Blue     [x] Lavender   [] on ice  [x] Green/yellow  [x] Green/black [] on ice  [x] Grey  [x] on ice  [] Yellow  [x] Red  [] Type/ Screen  [] ABG  [] VBG    [x] COVID-19 swab    [x] Rapid  [] PCR  [x] Flu swab  [] Peds Viral Panel     [] Urine Sample  [] Fecal Sample  [] Pelvic Cultures  [] Blood Cultures  [] X 2  [] STREP Cultures         Kimberlee Reagan RN  02/07/23 0989

## 2023-02-08 NOTE — PROGRESS NOTES
4 Eyes Skin Assessment     NAME:  Kellee Mcdaniel OF BIRTH:  1975  MEDICAL RECORD NUMBER:  [id number]    The patient is being assessed for  Admission    I agree that One RN have performed a thorough Head to Toe Skin Assessment on the patient. ALL assessment sites listed below have been assessed. Areas assessed by both nurses:    Head, Face, Ears, Shoulders, Back, Chest, Arms, Elbows, Hands, Sacrum. Buttock, Coccyx, Ischium, and Legs. Feet and Heels        Does the Patient have a Wound? Yes wound(s) were present on assessment.  LDA wound assessment was Initiated and completed by RN       Pradeep Prevention initiated by RN: Yes   Wound Care Orders initiated by RN: Yes    Pressure Injury (Stage 3,4, Unstageable, DTI, NWPT, and Complex wounds) if present place referral order by RN under : Yes    New and Established Ostomies, if present place, referral order under : No      Nurse 1 eSignature: Electronically signed by Sandra Cerna RN on 2/8/23 at 4:07 AM EST    **SHARE this note so that the co-signing nurse is able to place an eSignature**    Nurse 2 eSignature: Electronically signed by Dorathy Meigs, RN on 2/8/23 at 4:27 AM EST

## 2023-02-08 NOTE — ED PROVIDER NOTES
Emergency Department Encounter    Patient: Christian Jiménez  MRN: [id number]  : 1975  Date of Evaluation: 2023  ED Provider:  Swetha Fernandez DO    Triage Chief Complaint:   Shortness of Breath    [place]:  Christian Jiménez is a 52 y.o. male with a past medical history of hypertension, diabetes, hyperlipidemia, renal failure on dialysis; Monday/Wednesday/Friday who presents to the ED with a history of shortness of breath beginning last night. Patient states he missed his dialysis on Monday and he felt he could tough it out until his next dialysis tomorrow. However patient developed difficulty in breathing last night which got worse by morning and continued to worsen as the day progressed. Patient also endorses a history of orthopnea. Patient also endorses chest discomfort. Patient states it feels like somebody is sitting on the left side of his chest.    There is no associated history of fever, fatigue, ill contacts, URI, recent prolonged immobility, hospitalization, long distance travel, trauma/surgery. ROS - see HPI, below listed is current ROS at time of my eval:  Review of Systems    ROS is an in history; otherwise unremarkable    Past Medical History:   Diagnosis Date    Abscess     scrotal    Acute renal failure (ARF) ([address] 2019    Anxiety associated with depression     Anxiety associated with depression     Back pain 2012    Chest pain 2013    Diabetic nephropathy ([address]     Diabetic neuropathy ([address] 2011    Diabetic ulcer of left midfoot associated with type 2 diabetes mellitus, with fat layer exposed ([address] 2017    Diverticulosis     C scope + Dr. Sussy Bobby    DM (diabetes mellitus), type 2 ([address]     DR. 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 Jose Hopper    Necrotizing fasciitis [facility])     Nose fracture 1988    Panic attacks     Pericarditis 2003    Hospitalized with s/p heart cath normal    Peripheral autonomic neuropathy due to diabetes mellitus ([facility] [address] [facility]     Axonal EMG- NCS, March 2011    Sebaceous cyst 09/01/2011    URI (upper respiratory infection) 02/27/2012    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 type 2 diabetes mellitus, with muscle involvement without evidence of necrosis ([address] 9/21/2021    WD-Wound, surgical, infected, initial encounter 12/08/2017    Wrist fracture 1986     Past Surgical History:   Procedure Laterality Date    [address]  2003, 2013    Normal (Dr. Candi Juarez)    COLONOSCOPY  06/02/2011    Pandiverticulosis, Nonbleeding internal hemorrhoids, Repeat colonoscopy at age 48- Dr. Tracie Pierce      x 3    FOOT SURGERY Left 12/21/2021    EXCISION OF HEAD LEFT 2ND METATARSAL performed by Trena Bone DPM at [address] [facility] NONTUNNELED VASCULAR CATHETER  07/25/2022    IR NONTUNNELED VASCULAR CATHETER 7/25/2022 [facility] SPECIAL PROCEDURES    IR TUNNELED CATHETER PLACEMENT GREATER THAN 5 YEARS  07/27/2022    IR TUNNELED CATHETER PLACEMENT GREATER THAN 5 YEARS 7/27/2022 [facility] SPECIAL PROCEDURES    IR TUNNELED CATHETER PLACEMENT GREATER THAN 5 YEARS  11/28/2022    IR TUNNELED CATHETER PLACEMENT GREATER THAN 5 YEARS 11/28/2022 [facility] SPECIAL PROCEDURES    NOSE SURGERY  1993    \"had reconstruction surgery on nose a year after the other nose surgery\"    OTHER SURGICAL HISTORY Right 05/22/2015    I & D right great toe with partial amputation    OTHER SURGICAL HISTORY  12/04/2017    inc and drainage of abcess    PA INCISION BONE CORTEX FOOT Left 09/22/2018    LEFT FOOT DEBRIDEMENT INCISION AND DRAINAGE TOP AND BOTTOM performed by Rosalind Rodriguez DPM at [place] [address] 05/15/2021    SCROTAL AND PERINEAL DEBRIDEMENT performed by Jaiden Devries MD at [address] [facility] SURGERY N/A 2021    SCROTAL RE-DEBRIDEMENT  INCISION AND DRAINAGE performed by Venu Elizabeth MD at [address] N/A 2013    sebaceous cyst removal times 4     TOE AMPUTATION      right great toe    TOE AMPUTATION Right 2019    TOE AMPUTATION RIGHT 5TH TOE performed by Jon Araujo DPM at [address] Right 2019    TOE AMPUTATION RIGHT 4TH TOE performed by Jon Araujo DPM at [address] [facility] ENDOSCOPY  2011    Mild gastritis with moderatley severe antritis, small hiatal hernia, Dr. Caroline Garcia 2019    EGD [facility] [address] 2022    EGD DIAGNOSTIC ONLY performed by David Whalen MD at [address] [facility] ENDOSCOPY     Family History   Problem Relation Age of Onset    Cancer Mother         ?site    Stroke Mother     Bleeding Prob Mother     Diabetes Father     Heart Disease Father     High Blood Pressure Father     Obesity Father     Kidney Disease Father     Diabetes Sister     High Blood Pressure Sister     Mental Illness Sister     Obesity Sister     High Blood Pressure Other     Mental Illness Other         bipolar    Other Son         cyst in ear canal    ADHD Daughter      Social History     Socioeconomic History    Marital status:      Spouse name: Not on file    Number of children: Not on file    Years of education: Not on file    Highest education level: Not on file   Occupational History    Not on file   Tobacco Use    Smoking status: Former     Years: 20.00     Types: Cigarettes     Quit date: 2022     Years since quittin.5    Smokeless tobacco: Never    Tobacco comments:     Smokes when drinking/social   Vaping Use    Vaping Use: Never used   Substance and Sexual Activity    Alcohol use: Not Currently     Comment: occ    Drug use: Yes     Frequency: 7.0 times per week Types: Fabricio Daniels)     Comment: 12/20/21 @ 2000    Sexual activity: Yes     Partners: Female   Other Topics Concern    Not on file   Social History Narrative    Not on file     Social Determinants of Health     Financial Resource Strain: Not on file   Food Insecurity: Not on file   Transportation Needs: Not on file   Physical Activity: Not on file   Stress: Not on file   Social Connections: Not on file   Intimate Partner Violence: Not on file   Housing Stability: Not on file     Current Facility-Administered Medications   Medication Dose Route Frequency Provider Last Rate Last Admin    calcium gluconate 2,000 mg in sodium chloride 100 mL  2,000 mg IntraVENous Once Chidoziri C [place], DO        magnesium sulfate 1000 mg in dextrose 5% 100 mL IVPB  1,000 mg IntraVENous Once [facility], [facility]        furosemide (LASIX) injection 60 mg  60 mg IntraVENous Once Chidoziri C [facility], DO         Current Outpatient Medications   Medication Sig Dispense Refill    traZODone (DESYREL) 50 MG tablet Take 1 tablet by mouth nightly as needed for Sleep 30 tablet 0    atorvastatin (LIPITOR) 40 MG tablet Take 1 tablet by mouth nightly 30 tablet 3    carvedilol (COREG) 12.5 MG tablet Take 1 tablet by mouth 2 times daily (with meals) 60 tablet 3    epoetin paola-epbx (RETACRIT) 60477 UNIT/ML SOLN injection Inject 1 mL into the skin three times a week To be given in HD by nurse 6.6 mL 1    amLODIPine (NORVASC) 10 MG tablet Take 1 tablet by mouth daily 30 tablet 3    sevelamer (RENVELA) 800 MG tablet Take 800 mg by mouth 3 times daily (with meals)      ranolazine (RANEXA) 500 MG extended release tablet Take 1 tablet by mouth 2 times daily 60 tablet 3    aspirin 81 MG EC tablet Take 1 tablet by mouth daily 30 tablet 0    clopidogrel (PLAVIX) 75 MG tablet Take 1 tablet by mouth daily 30 tablet 0    oxyCODONE-acetaminophen (PERCOCET) 5-325 MG per tablet Take 1.5 tablets by mouth daily.       alogliptin (NESINA) 6.25 MG TABS tablet Take 1 tablet by mouth in the morning. 30 tablet 0    insulin lispro, 1 Unit Dial, (HUMALOG KWIKPEN) 100 UNIT/ML SOPN Inject 20 Units into the skin in the morning and 20 Units at noon and 20 Units in the evening. Inject before meals. 5 pen 0    insulin glargine (LANTUS SOLOSTAR) 100 UNIT/ML injection pen Inject 45 Units into the skin nightly 5 pen 0    ARIPiprazole (ABILIFY) 5 MG tablet Take 1 tablet by mouth at bedtime 30 tablet 0    pantoprazole (PROTONIX) 40 MG tablet Take 1 tablet by mouth in the morning and 1 tablet in the evening. Take before meals. 30 tablet 0    calcitRIOL (ROCALTROL) 0.25 MCG capsule Take 1 capsule by mouth in the morning. 30 capsule 0    Lancets MISC 1 each by Does not apply route daily 100 each 3    glucose monitoring kit (FREESTYLE) monitoring kit 1 each by Does not apply route once for 1 dose. 1 kit 0     Allergies   Allergen Reactions    Adhesive Tape Rash    Doxycycline Nausea And Vomiting    Reglan [Metoclopramide] Anxiety       Nursing Notes Reviewed    Physical Exam:  Triage VS:    ED Triage Vitals   Enc Vitals Group      BP 02/07/23 2026 (!) 160/85      Heart Rate 02/07/23 2026 91      Resp 02/07/23 2026 18      Temp 02/07/23 2026 97.7 °F (36.5 °C)      Temp Source 02/07/23 2026 Oral      SpO2 02/07/23 2026 98 %      Weight 02/07/23 2022 290 lb (131.5 kg)      Height 02/07/23 2022 5' 9\" (1.753 m)      Head Circumference --       Peak Flow --       Pain Score --       Pain Loc --       Pain Edu? --       Excl. in [address]? --        Physical Exam    My pulse ox interpretation is - normal    General appearance:  No acute distress. Skin:  Warm. Dry. Eye:  Extraocular movements intact. Ears, nose, mouth and throat:  Oral mucosa moist   Neck:  Trachea midline. Extremity:  No obvious deformity, erythema, or warmth. No swelling. Normal ROM. Distal pulses intact. Heart:  Regular rate and rhythm, normal S1 & S2, no extra heart sounds.     Perfusion:  intact  Respiratory: Labored respirations. Reduced breath sounds bilaterally. Abdominal:  Normal bowel sounds. Soft. Nontender. Non distended. No Organomegaly. No acevedo, Mcburney, Rovsing, fluctuance, shifting dullness  Back:  No CVA tenderness to palpation     Neurological:  Alert and oriented times 3. No focal neuro deficits.              Psychiatric:  Appropriate    I have reviewed and interpreted all of the currently available lab results from this visit (if applicable):  Results for orders placed or performed during the hospital encounter of 02/07/23   Rapid Flu Swab    Specimen: Nasopharyngeal   Result Value Ref Range    Rapid Influenza A Ag NEGATIVE NEGATIVE    Rapid Influenza B Ag NEGATIVE NEGATIVE   CBC with Auto Differential   Result Value Ref Range    WBC 8.0 4.0 - 10.5 K/CU MM    RBC 3.13 (L) 4.6 - 6.2 M/CU MM    Hemoglobin 9.1 (L) 13.5 - 18.0 GM/DL    Hematocrit 28.3 (L) 42 - 52 %    MCV 90.4 78 - 100 FL    MCH 29.1 27 - 31 PG    MCHC 32.2 32.0 - 36.0 %    RDW 13.5 11.7 - 14.9 %    Platelets 485 708 - 121 K/CU MM    MPV 10.3 7.5 - 11.1 FL    Differential Type AUTOMATED DIFFERENTIAL     Segs Relative 73.3 (H) 36 - 66 %    Lymphocytes % 15.8 (L) 24 - 44 %    Monocytes % 7.6 (H) 0 - 4 %    Eosinophils % 2.4 0 - 3 %    Basophils % 0.5 0 - 1 %    Segs Absolute 5.9 K/CU MM    Lymphocytes Absolute 1.3 K/CU MM    Monocytes Absolute 0.6 K/CU MM    Eosinophils Absolute 0.2 K/CU MM    Basophils Absolute 0.0 K/CU MM    Nucleated RBC % 0.0 %    Total Nucleated RBC 0.0 K/CU MM    Total Immature Neutrophil 0.03 K/CU MM    Immature Neutrophil % 0.4 0 - 0.43 %   CMP   Result Value Ref Range    Sodium 136 135 - 145 MMOL/L    Potassium 5.2 (H) 3.5 - 5.1 MMOL/L    Chloride 99 99 - 110 mMol/L    CO2 19 (L) 21 - 32 MMOL/L    BUN 92 (H) 6 - 23 MG/DL    Creatinine 9.9 (H) 0.9 - 1.3 MG/DL    Est, Glom Filt Rate 6 (L) >60 mL/min/1.73m2    Glucose 208 (H) 70 - 99 MG/DL    Calcium 6.7 (LL) 8.3 - 10.6 MG/DL    Albumin 3.4 3.4 - 5.0 GM/DL Total Protein 6.9 6.4 - 8.2 GM/DL    Total Bilirubin 0.2 0.0 - 1.0 MG/DL    ALT 8 (L) 10 - 40 U/L    AST 12 (L) 15 - 37 IU/L    Alkaline Phosphatase 68 40 - 129 IU/L    Anion Gap 18 (H) 4 - 16   Troponin   Result Value Ref Range    Troponin T 0.183 (HH) <0.01 NG/ML   Brain Natriuretic Peptide   Result Value Ref Range    Pro-BNP 26,613 (H) <300 PG/ML   Blood Gas, Venous   Result Value Ref Range    pH, Néstor 7.24 (L) 7.32 - 7.42    pCO2, Néstor 48 38 - 52 mmHG    pO2, Néstor 72 (H) 28 - 48 mmHG    Base Excess 7 (H) 0 - 3.3    HCO3, Venous 20.6 19 - 25 MMOL/L    O2 Sat, Néstor 90.3 (H) 50 - 70 %    Comment VBG    EKG 12 Lead   Result Value Ref Range    Ventricular Rate 87 BPM    Atrial Rate 87 BPM    P-R Interval 176 ms    QRS Duration 80 ms    Q-T Interval 394 ms    QTc Calculation (Bazett) 474 ms    P Axis 35 degrees    R Axis 44 degrees    T Axis 117 degrees    Diagnosis       Normal sinus rhythm  T wave abnormality, consider lateral ischemia  Prolonged QT  Abnormal ECG  When compared with ECG of 12-SEP-2022 09:05,  Inverted T waves have replaced nonspecific T wave abnormality in Lateral leads        Radiographs (if obtained):  Radiologist's Report Reviewed:  XR CHEST PORTABLE   Final Result   Increased lung markings bilaterally, may be related to mild pulmonary   vascular congestion versus bronchitis. EKG (if obtained): (All EKG's are interpreted by myself in the absence of a cardiologist)  Normal sinus rhythm,  Normal axis,   Ventricular rate of 87. No STEMI    CRITICAL CARE NOTE:  There was a high probability of clinically significant life-threatening deterioration of the patient's condition requiring my urgent intervention due to SOB. Oxygen, lasix, bedside monitoring was performed to address this. Total critical care time is AT LEAST 25 minutes.     This includes vital sign monitoring, pulse oximetry monitoring, telemetry monitoring, clinical response to the IV medications, reviewing the nursing notes, consultation time, dictation/documentation time, and interpretation of the lab work. This time excludes time spent performing procedures and separately billable procedures and family discussion time. MDM:    History source:Patient     History Limitations: none    66-year-old male with a past medical history of hypertension, diabetes, hyperlipidemia, renal failure on dialysis; Monday/Wednesday/Friday who presents to the ED with a history of shortness of breath beginning last night. Patient states he missed his dialysis on Monday and he felt he could tough it out until his next dialysis tomorrow. However patient developed difficulty in breathing last night which got worse by morning and continued to worsen as the day progressed. Patient also endorses a history of orthopnea. Patient also endorses chest discomfort. Patient states it feels like somebody is sitting on the left side of his chest.    There is no associated history of fever, fatigue, ill contacts, URI, recent prolonged immobility, hospitalization, long distance travel, trauma/surgery. Physical examination is significant for an acutely ill looking male in mild to moderate respiratory distress. Breath sounds are reduced bilaterally. Heart sounds also reduced. Of note patient has a large body habitus which may make auscultation of the lungs a challenging experience and could create a false reduction in breath sounds. Differentials include   Pneumonia, pleural effusion, PE, pulmonary edema, asthma, COPD, CHF, Pneumothorax, Tension pneumothorax,      Previous notes reviewed:   Discharge summary 11/26/2022    EKG was done and is significant for normal sinus rhythm, normal axis, ventricular rate of 87.   No STEMI  EKG is interpreted by me, pending read by cardiologist.       Laboratory evaluations include[de-identified]  Considered CBC, CMP/BMP, Troponin, +/-D.dimer,  Lactic acid, VBG, BNP, Coags  Done: VBG, BNP, CBC, CMP, COVID, rapid flu, troponin  Significant results: Hypocalcemia, hyperkalemia, uremia, high anion gap acidosis. Radiology include:  considered CXR, CT-chest   Done:Chest x-ray  Significant results: vascular congestion    Procedures  Procedures    Medications:   Medications   calcium gluconate 2,000 mg in sodium chloride 100 mL (has no administration in time range)   magnesium sulfate 1000 mg in dextrose 5% 100 mL IVPB (has no administration in time range)   furosemide (LASIX) injection 60 mg (has no administration in time range)       Consults   Nephrology: Dr. Pau Gibbons,    Social Determinants affecting management or disposition:  Missed dialysis    Reevaluation  Some improvement in symptoms following oxygen administration. Disposition   Considered: admission  Final disposition: admission       52years old male who presented to the ED with a history of SOB since last night following missed dialysis. Patient physical examination is significant for respiratory distress. Laboratory evaluation is significant for hypocalcemia, elevated creatinine, elevated AG and low CO2. Radiology is significant for pulmonary vascular congestion. .   Patient is given oxygen, lasix, calcium gluconate, magnesium, and endorsed an improvement/resolution of his/her symptoms, and hence is scheduled for admission     Case is also discussed with hospitalist who accepted the admission. Case also discussed with nephrologist who accepted the consult        Clinical Impression:  1. Hypocalcemia    2. Uremia    3. High anion gap metabolic acidosis    4. Hypoxia    5. Dyspnea, unspecified type      Disposition referral (if applicable):  No follow-up provider specified.   Disposition medications (if applicable):  New Prescriptions    No medications on file     ED Provider Disposition Time  DISPOSITION        Comment: Please note this report has been produced using speech recognition software and may contain errors related to that system including errors in grammar, punctuation, and spelling, as well as words and phrases that may be inappropriate. Efforts were made to edit the dictations.         [address] [facility],[address],   02/08/23 2056

## 2023-02-08 NOTE — CONSULTS
[address] [address] Continence Nurse  Consult Note       Chery Ortez  AGE: 52 y.o. GENDER: male  : 1975  TODAY'S DATE:  2023    Subjective:     Reason for CWOCN Evaluation and Assessment: wound assessment      Chery Ortez is a 52 y.o. male referred by:   [x] Physician  [] Nursing  [] Other:     Wound Identification:  Wound Type: diabetic  Contributing Factors: diabetes, poor glucose control, chronic pressure, obesity, and ESRD        PAST MEDICAL HISTORY        Diagnosis Date    Abscess 2010    scrotal    Acute renal failure (ARF) ([address] 2019    Anxiety associated with depression     Anxiety associated with depression     Back pain 2012    Chest pain 2013    Diabetic nephropathy ([address]     Diabetic neuropathy ([address] 2011    Diabetic ulcer of left midfoot associated with type 2 diabetes mellitus, with fat layer exposed ([address] 2017    Diverticulosis     C scope + Dr. Nicolasa Whitmore    DM (diabetes mellitus), type 2 ([address]     DR. 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 Nicolasa Whitmore    Necrotizing fasciitis [facility])     Nose fracture     Panic attacks     Pericarditis     Hospitalized with s/p heart cath normal    Peripheral autonomic neuropathy due to diabetes mellitus ([address]     Axonal EMG- NCS, 2011    Sebaceous cyst 2011    URI (upper respiratory infection) 2012    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 type 2 diabetes mellitus, with muscle involvement without evidence of necrosis ([address] 2021    WD-Wound, surgical, infected, initial encounter 2017    Wrist fracture 1986       PAST SURGICAL HISTORY    Past Surgical History:   Procedure Laterality Date    [address] [place] [address] Doretha  ,  Normal (Dr. Rashad Barbosa)    COLONOSCOPY  06/02/2011    Pandiverticulosis, Nonbleeding internal hemorrhoids, Repeat colonoscopy at age 48- Dr. Gus Frazier      x 3    FOOT SURGERY Left 12/21/2021    EXCISION OF HEAD LEFT 2ND METATARSAL performed by Devang Bauer DPM at [facility] NONTUNNELED VASCULAR CATHETER  07/25/2022    IR NONTUNNELED VASCULAR CATHETER 7/25/2022 [facility] SPECIAL PROCEDURES    IR TUNNELED CATHETER PLACEMENT GREATER THAN 5 YEARS  07/27/2022    IR TUNNELED CATHETER PLACEMENT GREATER THAN 5 YEARS 7/27/2022 [facility] SPECIAL PROCEDURES    IR TUNNELED CATHETER PLACEMENT GREATER THAN 5 YEARS  11/28/2022    IR TUNNELED CATHETER PLACEMENT GREATER THAN 5 YEARS 11/28/2022 [facility] SPECIAL PROCEDURES    NOSE SURGERY  1993    \"had reconstruction surgery on nose a year after the other nose surgery\"    OTHER SURGICAL HISTORY Right 05/22/2015    I & D right great toe with partial amputation    OTHER SURGICAL HISTORY  12/04/2017    inc and drainage of abcess    OR INCISION BONE CORTEX FOOT Left 09/22/2018    LEFT FOOT DEBRIDEMENT INCISION AND DRAINAGE TOP AND BOTTOM performed by Chacho Fleming DPM at [facility] N/A 05/15/2021    SCROTAL AND PERINEAL DEBRIDEMENT performed by John Morris MD at [place] [address] 05/16/2021    SCROTAL RE-DEBRIDEMENT  INCISION AND DRAINAGE performed by John Morris MD at [address] [place] [address] N/A 06/18/2013    sebaceous cyst removal times 4     TOE AMPUTATION      right great toe    TOE AMPUTATION Right 03/23/2019    TOE AMPUTATION RIGHT 5TH TOE performed by Chacho Fleming DPM at [address] Right 08/06/2019    TOE AMPUTATION RIGHT 4TH TOE performed by Chacho Fleming DPM at [address] [place], [place] [address]  06/02/2011    Mild gastritis with moderatley severe antritis, small hiatal hernia, Dr. Presley Ramos N/A 01/12/2019    EGD BIOPSY performed by Mone Li MD at [address] [place] [address] N/A 2022    EGD DIAGNOSTIC ONLY performed by Mone Li MD at [facility]    Family History   Problem Relation Age of Onset    Cancer Mother         ?site    Stroke Mother     Bleeding Prob Mother     Diabetes Father     Heart Disease Father     High Blood Pressure Father     Obesity Father     Kidney Disease Father     Diabetes Sister     High Blood Pressure Sister     Mental Illness Sister     Obesity Sister     High Blood Pressure Other     Mental Illness Other         bipolar    Other Son         cyst in ear canal    ADHD Daughter        SOCIAL HISTORY    Social History     Tobacco Use    Smoking status: Former     Years: 20.00     Types: Cigarettes     Quit date: 2022     Years since quittin.5    Smokeless tobacco: Never    Tobacco comments:     Smokes when drinking/social   Vaping Use    Vaping Use: Never used   Substance Use Topics    Alcohol use: Not Currently     Comment: occ    Drug use: Yes     Frequency: 7.0 times per week     Types: Marijuana Jesse Franco)     Comment: 21 @        ALLERGIES    Allergies   Allergen Reactions    Adhesive Tape Rash    Doxycycline Nausea And Vomiting    Reglan [Metoclopramide] Anxiety       MEDICATIONS    No current facility-administered medications on file prior to encounter.      Current Outpatient Medications on File Prior to Encounter   Medication Sig Dispense Refill    traZODone (DESYREL) 50 MG tablet Take 1 tablet by mouth nightly as needed for Sleep 30 tablet 0    atorvastatin (LIPITOR) 40 MG tablet Take 1 tablet by mouth nightly 30 tablet 3    carvedilol (COREG) 12.5 MG tablet Take 1 tablet by mouth 2 times daily (with meals) 60 tablet 3    epoetin paola-epbx (RETACRIT) 66167 UNIT/ML SOLN injection Inject 1 mL into the skin three times a week To be given in HD by nurse 6.6 mL 1    amLODIPine (NORVASC) 10 MG tablet Take 1 tablet by mouth daily 30 tablet 3 sevelamer (RENVELA) 800 MG tablet Take 800 mg by mouth 3 times daily (with meals)      ranolazine (RANEXA) 500 MG extended release tablet Take 1 tablet by mouth 2 times daily 60 tablet 3    aspirin 81 MG EC tablet Take 1 tablet by mouth daily 30 tablet 0    clopidogrel (PLAVIX) 75 MG tablet Take 1 tablet by mouth daily 30 tablet 0    [DISCONTINUED] metoprolol tartrate (LOPRESSOR) 25 MG tablet Take 0.5 tablets by mouth 2 times daily 30 tablet 0    oxyCODONE-acetaminophen (PERCOCET) 5-325 MG per tablet Take 1.5 tablets by mouth daily. alogliptin (NESINA) 6.25 MG TABS tablet Take 1 tablet by mouth in the morning. 30 tablet 0    insulin lispro, 1 Unit Dial, (HUMALOG KWIKPEN) 100 UNIT/ML SOPN Inject 20 Units into the skin in the morning and 20 Units at noon and 20 Units in the evening. Inject before meals. 5 pen 0    insulin glargine (LANTUS SOLOSTAR) 100 UNIT/ML injection pen Inject 45 Units into the skin nightly 5 pen 0    ARIPiprazole (ABILIFY) 5 MG tablet Take 1 tablet by mouth at bedtime 30 tablet 0    pantoprazole (PROTONIX) 40 MG tablet Take 1 tablet by mouth in the morning and 1 tablet in the evening. Take before meals. 30 tablet 0    calcitRIOL (ROCALTROL) 0.25 MCG capsule Take 1 capsule by mouth in the morning. 30 capsule 0    [DISCONTINUED] furosemide (LASIX) 40 MG tablet Take 1 tablet by mouth daily 30 tablet 1    [DISCONTINUED] insulin aspart (NOVOLOG) 100 UNIT/ML injection vial Inject 35 Units into the skin 3 times daily (before meals)      [DISCONTINUED] chlorthalidone (HYGROTON) 25 MG tablet Take 1 tablet by mouth daily 30 tablet 3    [DISCONTINUED] linagliptin (TRADJENTA) 5 MG tablet Take 1 tablet by mouth daily 30 tablet 5    Lancets MISC 1 each by Does not apply route daily 100 each 3    glucose monitoring kit (FREESTYLE) monitoring kit 1 each by Does not apply route once for 1 dose.  1 kit 0         Objective:      BP (!) 155/91   Pulse 84   Temp 97.9 °F (36.6 °C)   Resp 14   Ht 5' 9\" (1.753 m)   Wt 251 lb 8 oz (114.1 kg)   SpO2 100%   BMI 37.14 kg/m²   Pradeep Risk Score: Pradeep Scale Score: 22    LABS    CBC:   Lab Results   Component Value Date/Time    WBC 8.0 02/07/2023 09:17 PM    RBC 3.13 02/07/2023 09:17 PM    HGB 9.1 02/07/2023 09:17 PM    HCT 28.3 02/07/2023 09:17 PM    MCV 90.4 02/07/2023 09:17 PM    MCH 29.1 02/07/2023 09:17 PM    MCHC 32.2 02/07/2023 09:17 PM    RDW 13.5 02/07/2023 09:17 PM     02/07/2023 09:17 PM    MPV 10.3 02/07/2023 09:17 PM     CMP:    Lab Results   Component Value Date/Time     02/08/2023 05:45 AM    K 5.4 02/08/2023 05:45 AM    CL 98 02/08/2023 05:45 AM    CO2 19 02/08/2023 05:45 AM    BUN 96 02/08/2023 05:45 AM    CREATININE 11.2 02/08/2023 05:45 AM    GFRAA 10 10/03/2022 08:00 AM    LABGLOM 5 02/08/2023 05:45 AM    GLUCOSE 126 02/08/2023 05:45 AM    PROT 6.9 02/07/2023 09:17 PM    PROT 7.1 04/11/2012 09:31 AM    LABALBU 3.4 02/07/2023 09:17 PM    LABALBU 48 03/27/2021 06:54 PM    CALCIUM 6.8 02/08/2023 05:45 AM    BILITOT 0.2 02/07/2023 09:17 PM    ALKPHOS 68 02/07/2023 09:17 PM    AST 12 02/07/2023 09:17 PM    ALT 8 02/07/2023 09:17 PM     Albumin:    Lab Results   Component Value Date/Time    LABALBU 3.4 02/07/2023 09:17 PM    LABALBU 48 03/27/2021 06:54 PM     PT/INR:    Lab Results   Component Value Date/Time    PROTIME 11.7 11/28/2022 10:30 AM    PROTIME 12.5 12/13/2010 01:12 PM    INR 0.91 11/28/2022 10:30 AM     HgBA1c:    Lab Results   Component Value Date/Time    LABA1C 5.7 09/14/2022 03:47 AM         Assessment:     Patient Active Problem List   Diagnosis    Hiatal hernia    Diabetes mellitus type 2, improved controlled    Diabetic neuropathy (HCC)    Panic attack    Anxiety associated with depression    Neck pain    DANIELA (obstructive sleep apnea)    Urinary frequency    Bilateral carpal tunnel syndrome- left worse than right    Hyperlipidemia with target LDL less than 100    Essential hypertension    Bronchitis    Osteomyelitis ([facility] Abscess    Periumbilical hernia    Sepsis ([address]    Cellulitis of left foot    Constipation    Intractable nausea and vomiting    Uncontrolled type 2 diabetes mellitus    Nausea and vomiting    Diabetic foot infection ([address]    Acute renal failure (ARF) ([facility])    Cellulitis    Cellulitis of left arm    Cellulitis, scrotum    Acute epididymitis    Irene gangrene    Recurrent major depressive disorder, in full remission ([address]    Generalized anxiety disorder    Morbid obesity with body mass index (BMI) of 40.0 to 44.9 in adult [facility])    Necrotizing fasciitis ([address]    Syncope    Right groin wound    Ulcer of right groin with fat layer exposed ([address]    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 type 2 diabetes mellitus, with muscle involvement without evidence of necrosis ([address]    Nephrotic syndrome    Generalized edema    Stage 3b chronic kidney disease ([address]    Diabetic nephropathy associated with type 2 diabetes mellitus ([facility])    Hypoalbuminemia    Chronic kidney disease, stage IV (severe) ([facility])    FH: CAD (coronary artery disease)    ASCVD (arteriosclerotic cardiovascular disease)    Other proteinuria    Chest pain    Surgical wound dehiscence    CARMEN (acute kidney injury) ([facility])    Anemia    Chest tightness    NSTEMI (non-ST elevated myocardial infarction) ([address]    WD - Ulcer of forefoot due to type 2 diabetes mellitus ([address]    ESRD (end stage renal disease) ([address]    Problem with vascular access    Fluid overload       Measurements:  Wound 12/08/17 #3 abdoment (onset 3 days ago) SURGICAL (Active)   Number of days: 1888       Wound 12/08/17 #4 Lt plantar (onset 6 months ago) DIABETIC DAVE 1 (Active)   Number of days: 1888       Wound 05/18/18 # 5 LEFT PLANTAR (ONSET 1 YEAR AGO) DM WAG 1 (Active)   Number of days: 1727       Wound 09/17/18 Left dorsal foot and 4th toe amp site 9/19/18 (Active)   Number of days: 7830       Incision 05/22/15 Foot Right (Active)   Number of days: 2819       Incision 12/04/17 Abdomen Mid (Active)   Number of days: 1891       Wound 01/11/19 left plantar foot wound (Active)   Number of days: 2250       Wound 03/21/19 Toe (Comment  which one) Anterior;Right (Active)   Number of days: 1420       Wound 03/21/19 Foot Left;Posterior hard callus area (Active)   Number of days: 1420       Wound 03/24/21 Left;Plantar (Active)   Number of days: 686       Wound 05/13/21 Left;Plantar (Active)   Number of days: 440       Wound 05/17/21 Groin Right scrotum extends to lower buttock (Active)   Number of days: 632       Wound 09/01/22 #1 Left Plantar (Active)   Wound Image   02/08/23 1355   Wound Etiology Diabetic 02/08/23 1355   Dressing Status New dressing applied 02/08/23 1355   Wound Cleansed Cleansed with saline 02/08/23 1355   Dressing/Treatment Collagen;ABD;Roll gauze;Tape/Soft cloth adhesive tape 02/08/23 1355   Offloading for Diabetic Foot Ulcers Diabetic shoes/inserts 01/17/23 1431   Wound Length (cm) 1.4 cm 02/08/23 1355   Wound Width (cm) 1.8 cm 02/08/23 1355   Wound Depth (cm) 0.5 cm 02/08/23 1355   Wound Surface Area (cm^2) 2.52 cm^2 02/08/23 1355   Change in Wound Size % (l*w) 54.18 02/08/23 1355   Wound Volume (cm^3) 1.26 cm^3 02/08/23 1355   Wound Healing % -129 02/08/23 1355   Post-Procedure Length (cm) 1.5 cm 01/17/23 1429   Post-Procedure Width (cm) 1 cm 01/17/23 1429   Post-Procedure Depth (cm) 0.5 cm 01/17/23 1429   Post-Procedure Surface Area (cm^2) 1.5 cm^2 01/17/23 1429   Post-Procedure Volume (cm^3) 0.75 cm^3 01/17/23 1429   Distance Tunneling (cm) 0 cm 01/10/23 1417   Tunneling Position ___ O'Clock 0 02/08/23 1355   Undermining Starts ___ O'Clock 5 02/08/23 1355   Undermining Ends___ O'Clock 9 02/08/23 1355   Undermining Maxium Distance (cm) 0.5 02/08/23 1355   Wound Assessment Pink/red 02/08/23 1355   Drainage Amount Moderate 02/08/23 1355   Drainage Description Serosanguinous 02/08/23 1355   Odor None 02/08/23 1355   Shazia-wound Assessment Hyperkeratosis (callous) 02/08/23 1355   Margins Defined edges 02/08/23 1355   Wound Thickness Description not for Pressure Injury Full thickness 02/08/23 1355   Number of days: 160       Response to treatment:  Well tolerated by patient. Pain Assessment:  Severity:  none  Quality of pain: na  Wound Pain Timing/Severity: na  Premedicated: no    Plan:     Plan of Care: Wound 09/01/22 #1 Left Plantar-Dressing/Treatment: Collagen, ABD, Roll gauze, Tape/Soft cloth adhesive tape    Pt in bed. Agreeable to wound assessment. Active with outpatient wound clinic with Dr. Jordon Leal and note from 1/17/23 reviewed. Has diabetic wound to left plantar foot. Dressing removed. Cleansed with NS. Picture and measurements taken. Appears clean with calloused edges. Recommend collagen, abd, kerlix. Applied. Pt is generally not at risk for skin breakdown DAMEON rivera. Updated nurse. Specialty Bed Required : no  [] Low Air Loss   [] Pressure Redistribution  [] Fluid Immersion  [] Bariatric  [] Total Pressure Relief  [] Other:     Discharge Plan:  Placement for patient upon discharge: tbd  Hospice Care: no  Patient appropriate for Outpatient [address] [place] [address] Road: active    Patient/Caregiver Teaching:  Level of patient/caregiver understanding able to:   Voiced understanding.         Electronically signed by Cassia Crabtree RN, Zayra Martínez on 2/8/2023 at 2:44 PM

## 2023-02-08 NOTE — CARE COORDINATION
MCG criteria for renal failure, fluid overload reviewed at this time, criteria supports Inpatient Admission.  YANI,RN/CM

## 2023-02-08 NOTE — CARE COORDINATION
.CM has reviewed pt's chart for needs. MARIAJOSE screening shows that pt receives Dialysis on M-W-F at [facility], has PCP, insurance and is independent PTA. If any d/c needs arise please contact MARIAJOSE.   TE

## 2023-02-08 NOTE — ED NOTES
Patient arrived ambulatory to the ER with Complaints of Shortness of breath. Patient states it started yesterday and it gets worse when he lies down. Patient is also a dialysis patient and goes on MWF. Patient missed dialysis yesterday due to not feeling well. Respirations equal and unlabored, skin PWD.      Ada Lowe RN  02/07/23 2025

## 2023-02-08 NOTE — PROGRESS NOTES
02/08/23 0533   NIV Type   $NIV $Daily Charge   NIV Started/Stopped On   Equipment Type Autopap   Mode CPAP   Mask Type Full face mask   Mask Size Medium   Bonnet size Medium   Settings/Measurements   CPAP/EPAP 4 cmH2O   Resp 16   O2 Flow Rate (L/min) 3 L/min   Comfort Level Good   Using Accessory Muscles No   SpO2 96   Patient's Home Machine No   Patient Observation   Observations RRT set-up Autopap with full face mask

## 2023-02-08 NOTE — ED NOTES
PT continues to desat on 3 L NC. Dr. Dionicio Yoon notified.  approved respiratory to assist with C-pap. Resp staff responded to call.         Lonnie Hernandez RN  02/08/23 6722

## 2023-02-08 NOTE — ED NOTES
51 y/o male to the ed with a c/c of SOB. Patient reports that he is a mon-wed-fri dialysis patient who missed his Monday this week. Patient stated that he was going to \"stick it out\", but became too SOB to tolerate. Patient denies chest pain or difficulty breathing. Patient denies ABD pain, n/v/d or fever. Patient noted with + msp x 4, pupils PERRLA @ 3 and lung sounds that are clear and equil bilaterally. Patient placed on telemetry, BP and pulse ox. 12-lead EKG performed. Vitals noted as recorded. PIV placed with labs drawn and sent. Call light placed within patient's reach, and bed in lowest position with side rails up x 2 for safety. Provider at the bedside for assessment.         Hali Fleming RN  02/07/23 [id number]

## 2023-02-08 NOTE — PROGRESS NOTES
In-Patient Progress Note    Patient:  Rani Block 52 y.o. male MRN: [id number]     Date of Service: 2/8/2023    Hospital Day: 2      Chief complaint: had concerns including Shortness of Breath. Assessment and Plan   Rani Block, a 52 y.o. male, with a history of  hypertension, diabetes mellitus type 2, on long-term insulin, hyperlipidemia, depression, insomnia, morbid obesity, coronary artery disease, was admitted on 2/7/2023 with complaints of had concerns including Shortness of Breath. Assessment and plan    #. Shortness of breath secondary to fluid overload from missed dialysis. #.  ESRD on HD MWF  Patient reports that he missed his dialysis on Monday, last dialysis was on Friday, February 3; patient tried to see if he could tolerate till Wednesday but unable to tolerate the shortness of breath and presented to the ED on Tuesday night. In the ED, potassium 5.2, CO2 19. Chest x-ray-increased lung markings bilaterally, likely 2/2 pulmonary vascular congestion.  -Nephrology on board  -s/p HD today  -continue on home calcium and sevelamer     #.  Mild hyperkalemia  -Monitor with repeat BMP. #.  Anion gap metabolic acidosis     #. Hypocalcemia  -Patient received calcium supplementation in ED  -resumed home calcitriol     #. Left plantar foot ulcer  -No drainage noted, foul odor due to poor hygiene.  -Patient follows with wound care/podiatry  -Consult placed to podiatry-Dr. Beltrán Degree  -Wound care consulted. #.  Coronary artery disease  -History of PCI and stenting to LAD, left circumflex     #. Chronically elevated troponin  -S/p cath-9/2022-patent stents  -Continue Plavix, atorvastatin, carvedilol, ranolazine     #. Chronic normocytic anemia     #. ESRD on HD (initiated 7/2022)  -MWF  -Patient is on sevelamer, calcitriol     #. Hypertension  -Patient is on amlodipine, carvedilol     #.   Diabetes mellitus type 2, on long-term insulin  -Patient is on Lantus 75 units nightly, Humalog 35 units prior to meals, no sliding scale, doesn't measure his glucose at home. Patient is also on alogliptin.  -Blood glucose here have been trending low normal. Will use 35 units lantus nightly,12 units humalog premeal and sliding scale through the day. Adjust as needeed     #. Diabetic foot ulcer  -S/p amputation of the distal phalanx of right great toe-2015  -S/p amputation of the right fourth toe at the metatarsophalangeal joint-8/2019     #. Suspected DANIELA  -Patient was noted to be desaturating to 70s while sleeping. Oxygen saturation improves when patient is awake.  -apnea link  -NIV/ CPAP autotiration overnight    #. 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 Hyperlipidemia-on atorvastatin     #. Depression/insomnia  -Patient is on Abilify, trazodone     #. Chronic pain-Percocet. #.  Morbid obesity with BMI 42.83. # Peptic ulcer prophylaxis: Pantoprazole  # DVT Prophylaxis: Heparin subcutaneous  #CODE STATUS: full code      Current living situation: Home  Expected Disposition: Home  Estimated discharge date: 1 to 2 days      Review of System     Review of Systems   Constitutional:  Positive for fatigue. Negative for activity change, appetite change, chills and fever. HENT:  Negative for congestion, ear discharge, ear pain, hearing loss, nosebleeds, postnasal drip, rhinorrhea, sinus pain, sore throat, trouble swallowing and voice change. Eyes:  Negative for photophobia, pain, discharge, redness and itching. Respiratory:  Positive for shortness of breath. Negative for cough and chest tightness. Cardiovascular:  Positive for leg swelling. Negative for chest pain and palpitations. Gastrointestinal:  Negative for abdominal distention, abdominal pain, blood in stool, constipation, diarrhea, nausea and vomiting. Endocrine: Negative for cold intolerance, heat intolerance, polydipsia, polyphagia and polyuria.    Genitourinary:  Negative for difficulty urinating, dysuria, flank pain, frequency, hematuria and urgency. Musculoskeletal:  Negative for arthralgias, back pain, gait problem, joint swelling and myalgias. Skin:  Negative for pallor, rash and wound. Allergic/Immunologic: Negative for environmental allergies and food allergies. Neurological:  Negative for dizziness, tremors, seizures, syncope, speech difficulty, weakness, light-headedness, numbness and headaches. Hematological:  Negative for adenopathy. Does not bruise/bleed easily. Psychiatric/Behavioral:  Negative for agitation, confusion, decreased concentration, hallucinations, self-injury, sleep disturbance and suicidal ideas. The patient is not nervous/anxious and is not hyperactive. I have reviewed all pertinent PMHx, PSHx, FamHx, SocialHx, medications, and allergies and updated history as appropriate.     Physical Exam   VITAL SIGNS:  BP (!) 155/91   Pulse 84   Temp 97.9 °F (36.6 °C)   Resp 14   Ht 5' 9\" (1.753 m)   Wt 251 lb 8 oz (114.1 kg)   SpO2 100%   BMI 37.14 kg/m²   Tmax over 24 hours:  Temp (24hrs), Av.7 °F (36.5 °C), Min:97.2 °F (36.2 °C), Max:98 °F (36.7 °C)      Patient Vitals for the past 6 hrs:   BP Temp Pulse Resp SpO2 Weight   23 1115 (!) 155/91 -- 84 14 100 % --   23 1107 (!) 151/87 97.9 °F (36.6 °C) 81 14 100 % 251 lb 8 oz (114.1 kg)   23 1100 (!) 153/84 -- 83 19 100 % --   23 1045 (!) 152/94 -- 82 16 99 % --   23 1030 (!) 140/94 -- 82 17 98 % --   23 1015 (!) 193/174 -- 82 15 98 % --   23 1000 (!) 171/101 -- 83 15 98 % --   23 0945 (!) 191/107 -- 83 18 99 % --   23 0930 (!) 168/117 -- 84 18 99 % --   23 0915 (!) 167/100 -- 84 13 98 % --   23 0900 (!) 198/117 -- 82 14 97 % --   23 0845 (!) 185/114 -- 83 12 100 % --         Intake/Output Summary (Last 24 hours) at 2023 1434  Last data filed at 2023 1107  Gross per 24 hour   Intake 500 ml   Output 3500 ml   Net -3000 ml     Wt Readings from Last 2 Encounters: 02/08/23 251 lb 8 oz (114.1 kg)   12/12/22 282 lb (127.9 kg)     Body mass index is 37.14 kg/m². Physical Exam  Constitutional:       General: He is not in acute distress. Appearance: Normal appearance. He is normal weight. He is not ill-appearing, toxic-appearing or diaphoretic. HENT:      Head: Normocephalic and atraumatic. Right Ear: Tympanic membrane, ear canal and external ear normal. There is no impacted cerumen. Left Ear: Tympanic membrane, ear canal and external ear normal. There is no impacted cerumen. Nose: Nose normal. No congestion or rhinorrhea. Mouth/Throat:      Mouth: Mucous membranes are moist.      Pharynx: No oropharyngeal exudate or posterior oropharyngeal erythema. Eyes:      General: No scleral icterus. Right eye: No discharge. Left eye: No discharge. Extraocular Movements: Extraocular movements intact. Conjunctiva/sclera: Conjunctivae normal.      Pupils: Pupils are equal, round, and reactive to light. Neck:      Vascular: No carotid bruit. Cardiovascular:      Rate and Rhythm: Normal rate and regular rhythm. Pulses: Normal pulses. Heart sounds: Normal heart sounds. No murmur heard. No friction rub. No gallop. Pulmonary:      Effort: Pulmonary effort is normal. No respiratory distress. Breath sounds: Normal breath sounds. No stridor. No wheezing or rhonchi. Abdominal:      General: Abdomen is flat. Bowel sounds are normal. There is no distension. Palpations: Abdomen is soft. Tenderness: There is no abdominal tenderness. Musculoskeletal:         General: No swelling, tenderness, deformity or signs of injury. Normal range of motion. Cervical back: Normal range of motion and neck supple. No rigidity or tenderness. Right lower leg: No edema. Left lower leg: No edema. Lymphadenopathy:      Cervical: No cervical adenopathy. Skin:     General: Skin is warm.       Capillary Refill: Capillary refill takes less than 2 seconds. Coloration: Skin is not jaundiced or pale. Findings: No bruising or erythema. Neurological:      General: No focal deficit present. Mental Status: He is alert and oriented to person, place, and time. Cranial Nerves: No cranial nerve deficit. Sensory: No sensory deficit. Motor: No weakness. Coordination: Coordination normal.      Gait: Gait normal.      Deep Tendon Reflexes: Reflexes normal.   Psychiatric:         Mood and Affect: Mood normal.         Behavior: Behavior normal.         Thought Content: Thought content normal.         Judgment: Judgment normal.         Current Medications      sodium chloride flush  5-40 mL IntraVENous 2 times per day    heparin (porcine)  5,000 Units SubCUTAneous 3 times per day    insulin lispro  0-8 Units SubCUTAneous TID WC    insulin lispro  0-4 Units SubCUTAneous Nightly    amLODIPine  10 mg Oral Daily    ARIPiprazole  5 mg Oral Nightly    atorvastatin  40 mg Oral Nightly    carvedilol  12.5 mg Oral BID WC    clopidogrel  75 mg Oral Daily    insulin glargine  45 Units SubCUTAneous Nightly    pantoprazole  40 mg Oral BID AC    ranolazine  500 mg Oral BID    sevelamer  800 mg Oral TID WC    calcitRIOL  0.5 mcg Oral BID         Labs and Imaging Studies   Laboratory findings:  XR CHEST PORTABLE    Result Date: 2/7/2023  EXAMINATION: ONE XRAY VIEW OF THE CHEST 2/7/2023 9:24 pm COMPARISON: November 26, 2022 HISTORY: ORDERING SYSTEM PROVIDED HISTORY: sob TECHNOLOGIST PROVIDED HISTORY: Reason for exam:->sob Reason for Exam: sob FINDINGS: There is a right subclavian catheter with its tip at the superior cavoatrial junction. The cardiomediastinal silhouette is stable. There are increased lung markings bilaterally, may be related to mild pulmonary vascular congestion versus bronchitis. There is no pleural effusion. There is no pneumothorax. There is no acute osseous abnormality.      Increased lung markings bilaterally, may be related to mild pulmonary vascular congestion versus bronchitis.        Recent Results (from the past 24 hour(s))   EKG 12 Lead    Collection Time: 02/07/23  9:11 PM   Result Value Ref Range    Ventricular Rate 87 BPM    Atrial Rate 87 BPM    P-R Interval 176 ms    QRS Duration 80 ms    Q-T Interval 394 ms    QTc Calculation (Bazett) 474 ms    P Axis 35 degrees    R Axis 44 degrees    T Axis 117 degrees    Diagnosis       Normal sinus rhythm  T wave abnormality, consider lateral ischemia  Prolonged QT  Abnormal ECG  When compared with ECG of 12-SEP-2022 09:05,  Inverted T waves have replaced nonspecific T wave abnormality in Lateral leads     Blood Gas, Venous    Collection Time: 02/07/23  9:15 PM   Result Value Ref Range    pH, Néstor 7.24 (L) 7.32 - 7.42    pCO2, Néstor 48 38 - 52 mmHG    pO2, N[place]stor 72 (H) 28 - 48 mmHG    Base Excess 7 (H) 0 - 3.3    HCO3, Venous 20.6 19 - 25 MMOL/L    O2 Sat, Néstor 90.3 (H) 50 - 70 %    Comment VBG    CBC with Auto Differential    Collection Time: 02/07/23  9:17 PM   Result Value Ref Range    WBC 8.0 4.0 - 10.5 K/CU MM    RBC 3.13 (L) 4.6 - 6.2 M/CU MM    Hemoglobin 9.1 (L) 13.5 - 18.0 GM/DL    Hematocrit 28.3 (L) 42 - 52 %    MCV 90.4 78 - 100 FL    MCH 29.1 27 - 31 PG    MCHC 32.2 32.0 - 36.0 %    RDW 13.5 11.7 - 14.9 %    Platelets 859 117 - 451 K/CU MM    MPV 10.3 7.5 - 11.1 FL    Differential Type AUTOMATED DIFFERENTIAL     Segs Relative 73.3 (H) 36 - 66 %    Lymphocytes % 15.8 (L) 24 - 44 %    Monocytes % 7.6 (H) 0 - 4 %    Eosinophils % 2.4 0 - 3 %    Basophils % 0.5 0 - 1 %    Segs Absolute 5.9 K/CU MM    Lymphocytes Absolute 1.3 K/CU MM    Monocytes Absolute 0.6 K/CU MM    Eosinophils Absolute 0.2 K/CU MM    Basophils Absolute 0.0 K/CU MM    Nucleated RBC % 0.0 %    Total Nucleated RBC 0.0 K/CU MM    Total Immature Neutrophil 0.03 K/CU MM    Immature Neutrophil % 0.4 0 - 0.43 %   CMP    Collection Time: 02/07/23  9:17 PM   Result Value Ref Range Sodium 136 135 - 145 MMOL/L    Potassium 5.2 (H) 3.5 - 5.1 MMOL/L    Chloride 99 99 - 110 mMol/L    CO2 19 (L) 21 - 32 MMOL/L    BUN 92 (H) 6 - 23 MG/DL    Creatinine 9.9 (H) 0.9 - 1.3 MG/DL    Est, Glom Filt Rate 6 (L) >60 mL/min/1.73m2    Glucose 208 (H) 70 - 99 MG/DL    Calcium 6.7 (LL) 8.3 - 10.6 MG/DL    Albumin 3.4 3.4 - 5.0 GM/DL    Total Protein 6.9 6.4 - 8.2 GM/DL    Total Bilirubin 0.2 0.0 - 1.0 MG/DL    ALT 8 (L) 10 - 40 U/L    AST 12 (L) 15 - 37 IU/L    Alkaline Phosphatase 68 40 - 129 IU/L    Anion Gap 18 (H) 4 - 16   Troponin    Collection Time: 02/07/23  9:17 PM   Result Value Ref Range    Troponin T 0.183 (HH) <0.01 NG/ML   Brain Natriuretic Peptide    Collection Time: 02/07/23  9:17 PM   Result Value Ref Range    Pro-BNP 26,613 (H) <300 PG/ML   COVID-19, Rapid    Collection Time: 02/07/23  9:23 PM    Specimen: Nasopharyngeal   Result Value Ref Range    Source UNKNOWN     SARS-CoV-2, NAAT NOT DETECTED NOT DETECTED   Rapid Flu Swab    Collection Time: 02/07/23  9:23 PM    Specimen: Nasopharyngeal   Result Value Ref Range    Rapid Influenza A Ag NEGATIVE NEGATIVE    Rapid Influenza B Ag NEGATIVE NEGATIVE   Basic Metabolic Panel w/ Reflex to MG    Collection Time: 02/08/23  5:45 AM   Result Value Ref Range    Sodium 138 135 - 145 MMOL/L    Potassium 5.4 (H) 3.5 - 5.1 MMOL/L    Chloride 98 (L) 99 - 110 mMol/L    CO2 19 (L) 21 - 32 MMOL/L    Anion Gap 21 (H) 4 - 16    BUN 96 (H) 6 - 23 MG/DL    Creatinine 11.2 (H) 0.9 - 1.3 MG/DL    Est, Glom Filt Rate 5 (L) >60 mL/min/1.73m2    Glucose 126 (H) 70 - 99 MG/DL    Calcium 6.8 (LL) 8.3 - 10.6 MG/DL   Troponin    Collection Time: 02/08/23  5:45 AM   Result Value Ref Range    Troponin T 0.176 (HH) <0.01 NG/ML   POCT Glucose    Collection Time: 02/08/23  7:03 AM   Result Value Ref Range    POC Glucose 132 (H) 70 - 99 MG/DL   POCT Glucose    Collection Time: 02/08/23 11:43 AM   Result Value Ref Range    POC Glucose 127 (H) 70 - 99 MG/DL           Electronically signed by Raymond Naik MD on 2/8/2023 at 2:34 PM      Comment: Please note this report has been produced using speech recognition software and may contain errors related to that system including errors in grammar, punctuation, and spelling, as well as words and phrases that may be inappropriate. If there are any questions or concerns please feel free to contact the dictating provider for clarification.

## 2023-02-08 NOTE — H&P
History and Physical      Name:  Patrice Amaral /Age/Sex: 1975  (52 y.o. male)   MRN & CSN:  [id number] & [id number] Encounter Date/Time: 2023 10:54 PM EST   Location:  ED18/ED-18 PCP: Delia Flores MD       Hospital Day: 1    Assessment and Plan:     #. Shortness of breath secondary to fluid overload from missed dialysis. -Potassium 5.2, CO2 19 chest x-ray-increased lung markings bilaterally, may be related to mild pulmonary vascular congestion versus bronchitis. -Nephrology consulted from ED.  -Patient to be dialyzed in a.m.  -Patient received IV Lasix 60 mg x 1. #.  Mild hyperkalemia  -Monitor with repeat BMP. #.  Anion gap metabolic acidosis    #. Hypocalcemia  -Patient received calcium supplementation in ED    #. Coronary artery disease  -History of PCI and stenting to LAD, left circumflex    #. Chronically elevated troponin  -S/p cath-2022-patent stents  -Continue Plavix, atorvastatin, carvedilol, ranolazine    #. Chronic normocytic anemia    #. Left plantar foot ulcer  -No drainage noted, foul odor due to poor hygiene.  -Patient follows with wound care/podiatry  -Consult placed to podiatry-Dr. Mone Gonzalez  -Wound care consult. #.  ESRD on HD (initiated 2022)  -MWF  -Patient is on sevelamer, calcitriol    #. Hypertension  -Patient is on amlodipine, carvedilol    #. Diabetes mellitus type 2, on long-term insulin  -Patient is on Lantus 20 units nightly, insulin sliding scale  -Patient is also on alogliptin. #.  Diabetic foot ulcer  -S/p amputation of the distal phalanx of right great toe-  -S/p amputation of the right fourth toe at the metatarsophalangeal joint-2019    #. 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 Hyperlipidemia-on atorvastatin    #. Depression/insomnia  -Patient is on Abilify, trazodone    #. Chronic pain-Percocet. #.  Morbid obesity with BMI 42.83. #.  Suspected DANIELA  -Patient was noted to be desaturating to 70s while sleeping.   Oxygen saturation improves when patient is awake. -Pulmonology referral at discharge for sleep study. Disposition:   Current Living situation: home    Diet Diabetic, renal   DVT Prophylaxis [] Lovenox, [x]  Heparin, [] SCDs, [] Ambulation,  [] Eliquis, [] Xarelto   Code Status FULL   Surrogate Decision Maker/ POA      History from:   EMR, patient. History of Present Illness:     Chief Complaint: Fluid overload  Natanael Naik is a 52 y.o. male with hypertension, diabetes mellitus type 2, on long-term insulin, hyperlipidemia, depression, insomnia, morbid obesity, coronary artery disease, presented to ED with shortness of breath. Patient reported having shortness of breath, chest tightness since yesterday, unable to lie flat. Admits to missing dialysis on Monday. Denies any fever, chills, cough, denied any abdominal pain, denied any urinary complaints, denied any constipation or diarrhea. At presentation patient was noted to have /85, HR 91, RR 18, temp 97.7's, saturating 98% on room air. Lab work significant for potassium 5.2, CO2 19, BUN 92, creatinine 9.9, anion gap 18, calcium 6.7, random glucose 208, troponin 0.183, hemoglobin 9.1. Influenza A&B negative, rapid COVID-negative. VBG-pH 7.24, PCO2 48, PO2 72, HCO3 20.6. Chest x-ray-pulmonary vascular congestion. Patient received calcium gluconate, Lasix, magnesium sulfate in ED. Nephrology consulted from ED. Review of Systems: Need 10 Elements   10 point review of systems conducted and pertinent positives and negatives as per HPI.     Objective:   No intake or output data in the 24 hours ending 02/07/23 2254   Vitals:   Vitals:    02/07/23 2022 02/07/23 2026 02/07/23 2215   BP:  (!) 160/85 (!) 150/85   Pulse:  91 88   Resp:  18 15   Temp:  97.7 °F (36.5 °C) 98 °F (36.7 °C)   TempSrc:  Oral Oral   SpO2:  98% 96%   Weight: 290 lb (131.5 kg)     Height: 5' 9\" (1.753 m)         Medications Prior to Admission   Reviewed medications with patient    Prior to Admission medications    Medication Sig Start Date End Date Taking? Authorizing Provider   traZODone (DESYREL) 50 MG tablet Take 1 tablet by mouth nightly as needed for Sleep 11/29/22   Lida Boudreaux MD   atorvastatin (LIPITOR) 40 MG tablet Take 1 tablet by mouth nightly 9/14/22   Gage Bhat MD   carvedilol (COREG) 12.5 MG tablet Take 1 tablet by mouth 2 times daily (with meals) 9/14/22   Gage Bhat MD   epoetin paola-epbx (RETACRIT) 65379 UNIT/ML SOLN injection Inject 1 mL into the skin three times a week To be given in HD by nurse 9/16/22   Gage Bhat MD   amLODIPine (NORVASC) 10 MG tablet Take 1 tablet by mouth daily 9/14/22   Gage Bhat MD   sevelamer (RENVELA) 800 MG tablet Take 800 mg by mouth 3 times daily (with meals) 8/9/22   Historical Provider, MD   ranolazine (RANEXA) 500 MG extended release tablet Take 1 tablet by mouth 2 times daily 9/6/22   Bee Rosario MD   aspirin 81 MG EC tablet Take 1 tablet by mouth daily 8/25/22   Herb Mclean MD   clopidogrel (PLAVIX) 75 MG tablet Take 1 tablet by mouth daily 8/25/22   Herb Mclean MD   metoprolol tartrate (LOPRESSOR) 25 MG tablet Take 0.5 tablets by mouth 2 times daily 8/25/22 9/14/22  Herb Mclean MD   oxyCODONE-acetaminophen (PERCOCET) 5-325 MG per tablet Take 1.5 tablets by mouth daily. Historical Provider, MD   alogliptin (NESINA) 6.25 MG TABS tablet Take 1 tablet by mouth in the morning. 8/4/22   Kristy Lopez MD   insulin lispro, 1 Unit Dial, (HUMALOG KWIKPEN) 100 UNIT/ML SOPN Inject 20 Units into the skin in the morning and 20 Units at noon and 20 Units in the evening. Inject before meals.  8/3/22   Kristy Lopez MD   insulin glargine (LANTUS SOLOSTAR) 100 UNIT/ML injection pen Inject 45 Units into the skin nightly 8/3/22   Kristy Lopez MD   ARIPiprazole (ABILIFY) 5 MG tablet Take 1 tablet by mouth at bedtime 8/3/22   Kristy Lopez MD   pantoprazole (PROTONIX) 40 MG tablet Take 1 tablet by mouth in the morning and 1 tablet in the evening. Take before meals. 8/3/22   Leticia Mcgowan MD   calcitRIOL (ROCALTROL) 0.25 MCG capsule Take 1 capsule by mouth in the morning. 8/4/22   Leticia Mcgowan MD   furosemide (LASIX) 40 MG tablet Take 1 tablet by mouth daily 9/22/21 8/3/22  Kylah Hannon MD   insulin aspart (NOVOLOG) 100 UNIT/ML injection vial Inject 35 Units into the skin 3 times daily (before meals)  8/3/22  Historical Provider, MD   chlorthalidone (HYGROTON) 25 MG tablet Take 1 tablet by mouth daily 6/15/21 8/3/22  Evi Tapia MD   linagliptin (TRADJENTA) 5 MG tablet Take 1 tablet by mouth daily 3/29/21 8/3/22  Evi Tapia MD   Lancets MISC 1 each by Does not apply route daily 9/21/18   Anum Crowder MD   glucose monitoring kit (FREESTYLE) monitoring kit 1 each by Does not apply route once for 1 dose. 6/20/13 6/20/13  Kindra Molina MD       Physical Exam: Need 8 Elements   Physical Exam     GEN  -Awake, alert, NAD.   EYES   -PERRL. HENT  -MM are moist.   RESP  -LS CTA equal bilat, no wheezes, rales or rhonchi. Symmetric chest movement. No respiratory distress noted. C/V  -S1/S2 auscultated. RRR without appreciable M/R/G. No JVD or carotid bruits. Peripheral pulses equal bilaterally and palpable. No peripheral edema. No reproducible chest wall tenderness. GI  -Abdomen is soft, non-distended, no significant tenderness. No masses or guarding. + BS in all quadrants. Rectal exam deferred.   -No CVA tenderness. Luna catheter is not present. MS  -B/L extremities strong muscles strength. Full movements. No gross joint deformities. No swelling, intact sensation symmetrical.   SKIN  -Normal coloration, warm, dry. NEURO  - Awake, alert, oriented x 3, no focal deficits. PSYC  - Appropriate affect. Past Medical History:   Reviewed patient's past medical, surgical, social, family history and allergies.       PMHx   Past Medical History:   Diagnosis Date    Abscess 2010    scrotal    Acute renal failure (ARF) ([address] 08/04/2019    Anxiety associated with depression     Anxiety associated with depression     Back pain 07/02/2012    Chest pain 05/01/2013    Diabetic nephropathy ([address]     Diabetic neuropathy ([address] 12/22/2011    Diabetic ulcer of left midfoot associated with type 2 diabetes mellitus, with fat layer exposed ([address] 07/18/2017    Diverticulosis     C scope + Dr. Alfonso Schmidt    DM (diabetes mellitus), type 2 ([address] 2002    DR. 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 Alfonso Schmidt    Necrotizing fasciitis [facility])     Nose fracture 1988    Panic attacks     Pericarditis 2003    Hospitalized with s/p heart cath normal    Peripheral autonomic neuropathy due to diabetes mellitus ([address]     Axonal EMG- NCS, March 2011    Sebaceous cyst 09/01/2011    URI (upper respiratory infection) 02/27/2012    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 type 2 diabetes mellitus, with muscle involvement without evidence of necrosis ([address] 9/21/2021    WD-Wound, surgical, infected, initial encounter 12/08/2017    Wrist fracture 1986     PSHX:  has a past surgical history that includes Cardiac catheterization (2003, 2013); Tonsillectomy and adenoidectomy (1988); Upper gastrointestinal endoscopy (06/02/2011); Colonoscopy (06/02/2011); Nose surgery (1993); skin biopsy (N/A, 06/18/2013); other surgical history (Right, 05/22/2015); Toe amputation; Abdomen surgery; other surgical history (12/04/2017); pr incision bone cortex foot (Left, 09/22/2018); Upper gastrointestinal endoscopy (N/A, 01/12/2019); Toe amputation (Right, 03/23/2019); Toe amputation (Right, 08/06/2019); Scrotal surgery (N/A, 05/15/2021); Scrotal surgery (N/A, 05/16/2021); Foot surgery (Left, 12/21/2021);  Upper gastrointestinal endoscopy (N/A, 2022); IR TUNNELED CVC PLACE WO SQ PORT/PUMP > 5 YEARS (2022); IR NONTUNNELED VASCULAR CATHETER > 5 YEARS (2022); Coronary stent placement; and IR TUNNELED CVC PLACE WO SQ PORT/PUMP > 5 YEARS (2022). Allergies: Allergies   Allergen Reactions    Adhesive Tape Rash    Doxycycline Nausea And Vomiting    Reglan [Metoclopramide] Anxiety     Fam HX: family history includes ADHD in his daughter; Bleeding Prob in his mother; Cancer in his mother; Diabetes in his father and sister; Heart Disease in his father; High Blood Pressure in his father, sister, and another family member; Kidney Disease in his father; Mental Illness in his sister and another family member; Obesity in his father and sister; Other in his son; Stroke in his mother.   Soc HX:   Social History     Socioeconomic History    Marital status:    Tobacco Use    Smoking status: Former     Years: 20.00     Types: Cigarettes     Quit date: 2022     Years since quittin.5    Smokeless tobacco: Never    Tobacco comments:     Smokes when drinking/social   Vaping Use    Vaping Use: Never used   Substance and Sexual Activity    Alcohol use: Not Currently     Comment: occ    Drug use: Yes     Frequency: 7.0 times per week     Types: Marijuana [place])     Comment: 21 @     Sexual activity: Yes     Partners: Female       Medications:   Medications:    calcium gluconate  2,000 mg IntraVENous Once    magnesium sulfate  1,000 mg IntraVENous Once    furosemide  60 mg IntraVENous Once      Infusions:   PRN Meds:      Labs      CBC:   Recent Labs     23   WBC 8.0   HGB 9.1*        BMP:    Recent Labs     23      K 5.2*   CL 99   CO2 19*   BUN 92*   CREATININE 9.9*   GLUCOSE 208*     Hepatic:   Recent Labs     23   AST 12*   ALT 8*   BILITOT 0.2   ALKPHOS 68     Lipids:   Lab Results   Component Value Date/Time    CHOL 166 2022 02:02 PM    CHOL 168 10/15/2013 10:30 AM    HDL 30 02:02 PM    CHOL 168 10/15/2013 10:30 AM    HDL 30 09/22/2022 02:02 PM    TRIG 254 09/22/2022 02:02 PM     Hemoglobin A1C:   Lab Results   Component Value Date/Time    LABA1C 5.7 09/14/2022 03:47 AM     TSH: No results found for: TSH  Troponin:   Lab Results   Component Value Date/Time    TROPONINT 0.183 02/07/2023 09:17 PM    TROPONINT 0.155 09/13/2022 11:49 AM    TROPONINT 0.156 09/12/2022 08:52 PM     Lactic Acid: No results for input(s): LACTA in the last 72 hours. BNP:   Recent Labs     02/07/23  2117   PROBNP 26,613*     UA:  Lab Results   Component Value Date/Time    NITRU NEGATIVE 07/24/2022 11:40 PM    COLORU YELLOW 07/24/2022 11:40 PM    PHUR 6.5 06/20/2013 09:51 AM    WBCUA 2 07/24/2022 11:40 PM    RBCUA 1 07/24/2022 11:40 PM    MUCUS RARE 07/24/2022 11:40 PM    TRICHOMONAS NONE SEEN 07/24/2022 11:40 PM    BACTERIA NEGATIVE 07/24/2022 11:40 PM    CLARITYU CLEAR 07/24/2022 11:40 PM    SPECGRAV 1.020 07/24/2022 11:40 PM    LEUKOCYTESUR NEGATIVE 07/24/2022 11:40 PM    UROBILINOGEN 0.2 07/24/2022 11:40 PM    BILIRUBINUR NEGATIVE 07/24/2022 11:40 PM    BILIRUBINUR neg 06/20/2013 09:51 AM    BLOODU SMALL NUMBER OR AMOUNT OBSERVED 07/24/2022 11:40 PM    GLUCOSEU 1,000 06/20/2013 09:51 AM    KETUA NEGATIVE 07/24/2022 11:40 PM    AMORPHOUS RARE 07/24/2022 11:40 PM     Urine Cultures: No results found for: LABURIN  Blood Cultures: No results found for: BC  No results found for: BLOODCULT2  Organism:   Lab Results   Component Value Date/Time    ORG SAUR 01/07/2019 12:08 AM       Imaging/Diagnostics Last 24 Hours   XR CHEST PORTABLE    Result Date: 2/7/2023  EXAMINATION: ONE XRAY VIEW OF THE CHEST 2/7/2023 9:24 pm COMPARISON: November 26, 2022 HISTORY: ORDERING SYSTEM PROVIDED HISTORY: sob TECHNOLOGIST PROVIDED HISTORY: Reason for exam:->sob Reason for Exam: sob FINDINGS: There is a right subclavian catheter with its tip at the superior cavoatrial junction. The cardiomediastinal silhouette is stable.   There are mild pulmonary vascular congestion versus bronchitis. There is no pleural effusion. There is no pneumothorax. There is no acute osseous abnormality. Increased lung markings bilaterally, may be related to mild pulmonary vascular congestion versus bronchitis. Personally reviewed Lab Studies, Imaging, and discussed case with ED physician/ED provider.     Electronically signed by Elva Marvin MD on 2/7/2023 at 10:54 PM

## 2023-02-08 NOTE — PROGRESS NOTES
Patient Name: Enrique Richards  Patient : 1975  MRN: [id number]     Acct: [de-identified]  Date of Admission: 2023  Room/Bed: 3108/3108-A  Code Status:  Full Code  Allergies:    Allergies   Allergen Reactions    Adhesive Tape Rash    Doxycycline Nausea And Vomiting    Reglan [Metoclopramide] Anxiety     Diagnosis:    Patient Active Problem List   Diagnosis    Hiatal hernia    Diabetes mellitus type 2, improved controlled    Diabetic neuropathy (HCC)    Panic attack    Anxiety associated with depression    Neck pain    DANIELA (obstructive sleep apnea)    Urinary frequency    Bilateral carpal tunnel syndrome- left worse than right    Hyperlipidemia with target LDL less than 100    Essential hypertension    Bronchitis    Osteomyelitis (HCC)    Abscess    Periumbilical hernia    Sepsis (HCC)    Cellulitis of left foot    Constipation    Intractable nausea and vomiting    Uncontrolled type 2 diabetes mellitus    Nausea and vomiting    Diabetic foot infection (HCC)    Acute renal failure (ARF) (HCC)    Cellulitis    Cellulitis of left arm    Cellulitis, scrotum    Acute epididymitis    Irene gangrene    Recurrent major depressive disorder, in full remission ([address]    Generalized anxiety disorder    Morbid obesity with body mass index (BMI) of 40.0 to 44.9 in [facility])    Necrotizing fasciitis (HCC)    Syncope    Right groin wound    Ulcer of right groin with fat layer exposed ([address]    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 type 2 diabetes mellitus, with muscle involvement without evidence of necrosis (HCC)    Nephrotic syndrome    Generalized edema    Stage 3b chronic kidney disease (HCC)    Diabetic nephropathy associated with type 2 diabetes mellitus (HCC)    Hypoalbuminemia    Chronic kidney disease, stage IV (severe) (HCC)    FH: CAD (coronary artery disease)    ASCVD (arteriosclerotic cardiovascular disease)    Other proteinuria    Chest pain    Surgical wound dehiscence    CARMEN (acute kidney injury) ([facility]    Anemia    Chest tightness    NSTEMI (non-ST elevated myocardial infarction) ([facility])    WD - Ulcer of forefoot due to type 2 diabetes mellitus ([facility]    ESRD (end stage renal disease) ([facility]    Problem with vascular access    Fluid overload         Treatment:  Hemodilaysis 2:1  Priority: Routine  Location: Acute Room    Diabetic: Yes  NPO: No  Isolation Precautions: Dialysis     Consent for Treatment Verified: Yes  Blood Consent Verified: Not Applicable     Safety Verified: Identify (I), Consent (C), Equipment (E), HepB Status (B), Orders Complete (O), Access Verified (A), and Timeliness (T)  Time out performed prior to access at 0736 hours. Report Received from Primary RN at 0706 hours. Primary RN (First Initial, Last Name, Title): Thelma Godinez RN  Incapacitated Nurse Education Completed: Not Applicable     HBsAg ONLY:  Date Drawn: February 3, 2023       Results: Negative  HBsAb:  Date Drawn:  February 8, 2023       Results: Unknown    Order  Dialysis Bath  K+ (Potassium): 2  Ca+ (Calcium):  (3.5)  Na+ (Sodium): 138  HCO3 (Bicarb): 35  Bicarbonate Concentrate Lot No.: [id number]  Acid Concentrate Lot No.: [id number]     Na+ Modeling: Not Applicable  Dialyzer: [id number]  Dialysate Temperature (C):  35  Blood Flow Rate (BFR):  300   Dialysate Flow Rate (DFR):   600        Access to be Utilized   Access:  Tunneled Catheter  Location: Internal Jugular  Side: Right   Needle gauge:  Not Applicable  + Bruit/Thrill: Not Applicable    First Use X-ray Verified: Not Applicable  OK to use line order: Not Applicable    Site Assessment:  Signs and Symptoms of Infection/Inflammation: None  If yes: Not Applicable  Dressing: Dry and Intact  Site Prep: Medical Aseptic Technique  Dressing Changed this Treatment: Yes  If yes, by whom: Steven aMrc RN  Date of Last Dressing Change: Not Applicable  Antimicrobial Patch in place?: Yes  Red Alcohol Caps in place?: Yes  Gauze Dressing?: No  Non Dialysis Use?: No  Comment:    Flows: Good, Patent  If access problem, who was notified:     Pre and Post-Assessment  Patient Vitals for the past 8 hrs:   Level of Consciousness Oriented X Heart Rhythm Respiratory Quality/Effort O2 Device Bilateral Breath Sounds Skin Condition/Temp Abdomen Inspection Bowel Sounds (All Quadrants) Edema Edema Generalized Pain Level   02/08/23 0412 0 -- -- -- -- -- -- -- -- -- -- 7   02/08/23 0740 0 4 Regular Unlabored Nasal cannula Diminished Dry; Warm Obese Active Generalized Non-pitting 7   02/08/23 1115 0 4 Regular Unlabored Nasal cannula Diminished Dry; Warm Obese Active Generalized Non-pitting 6     Labs  Recent Labs     02/07/23 2117   WBC 8.0   HGB 9.1*   HCT 28.3*                                                                     Recent Labs     02/07/23 2117 02/08/23  0545    138   K 5.2* 5.4*   CL 99 98*   CO2 19* 19*   BUN 92* 96*   CREATININE 9.9* 11.2*   GLUCOSE 208* 126*     IV Drips and Rate/Dose   sodium chloride      dextrose        Safety - Before each treatment:   Dialysis Machine No.: [id number]   Machine Number: [id number]  Dialyzer Lot No.: [id number]  RO Machine Log Sheet Completed: Yes  Machine Alarm Self Test: Completed; Passed (02/08/23 0740)     Air Foam Detector: Tested, Proper Function, pH Reading  Extracorporeal Circuit Tested for Integrity: Yes  Machine Conductivity: 13.8  Manual Conductivity: 13.6     Bicarbonate Concentrate Lot No.: [id number]  Acid Concentrate Lot No.: [id number]  Manual Ph: 7.4  Bleach Test (Neg): Yes  Bath Temperature: 95 °F (35 °C)  Tubing Lot#: [id number]  Conductivity Meter Serial #: [id number]  All Connections Secure?: Yes  Venous Parameters Set?: Yes  Arterial Parameters Set?: Yes  Saline Line Double Clamped?: Yes  Air Foam Detector Engaged?: Yes  Machine Functioning Alarm Free?  Yes  Prime Given: 200ml    Chlorine Testing - Before each treatment and every 4 hours:   Treatment  Treatment Number: 1  Time On: 1837  Time Off: 1107  Weight: 251 lb 8 oz (114.1 kg) (02/08/23 1107)  1st check: less than 0.1 ppm at: 0700 hours  2nd check: less than 0.1 ppm at: 1010 hours  3rd check: Not Applicable  (if greater than 0.1 ppm, then check every 30 minutes from secondary)    Access Flows and Pressures  Patient Vitals for the past 8 hrs:   Blood Flow Rate (ml/min) Ultrafiltration Rate (ml/hr) Arterial Pressure (mmHg) Venous Pressure (mmHg) TMP DFR Comments Access Visible   02/08/23 0746 300 ml/min 1000 ml/hr -60 mmHg 40 50 600 tx initiated Yes   02/08/23 0800 350 ml/min 1000 ml/hr -70 mmHg 90 50 600 AWAKE AND ALERT Yes   02/08/23 0815 350 ml/min 1000 ml/hr -70 mmHg 80 50 600 snoring Yes   02/08/23 0830 350 ml/min 1000 ml/hr -70 mmHg 80 50 600 resting Yes   02/08/23 0845 350 ml/min 1000 ml/hr -100 mmHg 90 50 600 no changed Yes   02/08/23 0900 350 ml/min 1000 ml/hr -100 mmHg 90 50 600 denies needs Yes   02/08/23 0915 350 ml/min 1000 ml/hr -100 mmHg 100 50 600 resting Yes   02/08/23 0930 350 ml/min 1000 ml/hr -90 mmHg 100 50 600 RESTING WITH EYES CLOSED Yes   02/08/23 0945 350 ml/min 1000 ml/hr -90 mmHg 100 50 600 DENIES NEEDS Yes   02/08/23 1000 350 ml/min 1000 ml/hr -100 mmHg 100 50 600 RESTING Yes   02/08/23 1015 350 ml/min 1000 ml/hr -70 mmHg 200 50 600 NO CHANGE, RESTING Yes   02/08/23 1030 300 ml/min 1000 ml/hr -70 mmHg 300 50 600 no complaints Yes   02/08/23 1045 250 ml/min 1480 ml/hr -40 mmHg 350 70 600 SYSTEM BEGINNING TO CLOT, CONT.  TO MONITOR Yes   02/08/23 1100 205 ml/min 1480 ml/hr -30 mmHg 350 70 600 UF DECREASED AGAIN DUE TO CLOTTING SYSTEM, RN AWARE CONT TO MONITOR Yes   02/08/23 1107 200 ml/min 0 ml/hr -30 mmHg 210 30 600 TX ENDED, RINSEBACK GIVEN Yes     Vital Signs  Patient Vitals for the past 8 hrs:   BP Temp Pulse Resp SpO2 Weight Weight Method Percent Weight Change   02/08/23 0412 (!) 167/86 97.2 °F (36.2 °C) 84 15 98 % -- -- --   02/08/23 0533 -- -- -- 16 -- -- -- --   02/08/23 0740 (!) 159/89 97.9 °F (36.6 °C) 81 14 100 % 291 lb 4.8 oz (132.1 kg) -- 0.45   02/08/23 0746 (!) 156/102 -- 81 14 100 % -- -- --   02/08/23 0800 (!) 177/106 -- 84 18 98 % -- -- --   02/08/23 0815 (!) 193/118 -- 82 13 100 % -- -- --   02/08/23 0830 (!) 191/128 -- 83 12 99 % -- -- --   02/08/23 0845 (!) 185/114 -- 83 12 100 % -- -- --   02/08/23 0900 (!) 198/117 -- 82 14 97 % -- -- --   02/08/23 0915 (!) 167/100 -- 84 13 98 % -- -- --   02/08/23 0930 (!) 168/117 -- 84 18 99 % -- -- --   02/08/23 0945 (!) 191/107 -- 83 18 99 % -- -- --   02/08/23 1000 (!) 171/101 -- 83 15 98 % -- -- --   02/08/23 1015 (!) 193/174 -- 82 15 98 % -- -- --   02/08/23 1030 (!) 140/94 -- 82 17 98 % -- -- --   02/08/23 1045 (!) 152/94 -- 82 16 99 % -- -- --   02/08/23 1100 (!) 153/84 -- 83 19 100 % -- -- --   02/08/23 1107 (!) 151/87 97.9 °F (36.6 °C) 81 14 100 % 251 lb 8 oz (114.1 kg) Bed scale -13.66   02/08/23 1115 (!) 155/91 -- 84 14 100 % -- -- --     Post-Dialysis  Arterial Catheter Locking Solution:  NS  Venous Catheter Locking Solution:  NS  Post-Treatment Procedures: Blood returned, Catheter Capped, clamped with Saline x2 ports  Machine Disinfection Process: Exterior Machine Disinfection  Rinseback Volume (ml): 300 ml  Blood Volume Processed (Liters): 63.7 l/min  Dialyzer Clearance: Heavily streaked  Duration of Treatment (minutes): 210 minutes     Hemodialysis Intake (ml): 500 ml  Hemodialysis Output (ml): 3500 ml     Tolerated Treatment: Good  Patient Response to Treatment: TOLERATED WELL  Physician Notified: No       Provider Notification        Handoff complete and report given to Primary RN at 1120 hours.   Primary RN (First Initial, Last Name, Title):  Cabrera Gross RN     Education  Person Educated: Patient   Knowledge Base: Substantial  Barriers to Learning?: None  Preferred method of Learning: Oral  Topic(s): Access Care, Signs and Symptoms of Infection, and Procedural   Teaching Tools: Explanation   Response to Education: Verbalized Understanding     Electronically signed by Heri Savage RN on 2/8/2023 at 12:07 PM

## 2023-02-09 LAB
ANION GAP SERPL CALCULATED.3IONS-SCNC: 14 MMOL/L (ref 4–16)
BASOPHILS ABSOLUTE: 0 K/CU MM
BASOPHILS RELATIVE PERCENT: 0.4 % (ref 0–1)
BUN SERPL-MCNC: 64 MG/DL (ref 6–23)
CALCIUM SERPL-MCNC: 7.3 MG/DL (ref 8.3–10.6)
CHLORIDE BLD-SCNC: 101 MMOL/L (ref 99–110)
CO2: 22 MMOL/L (ref 21–32)
CREAT SERPL-MCNC: 8.6 MG/DL (ref 0.9–1.3)
DIFFERENTIAL TYPE: ABNORMAL
EOSINOPHILS ABSOLUTE: 0.2 K/CU MM
EOSINOPHILS RELATIVE PERCENT: 2.3 % (ref 0–3)
GFR SERPL CREATININE-BSD FRML MDRD: 7 ML/MIN/1.73M2
GLUCOSE BLD-MCNC: 114 MG/DL (ref 70–99)
GLUCOSE BLD-MCNC: 150 MG/DL (ref 70–99)
GLUCOSE BLD-MCNC: 252 MG/DL (ref 70–99)
GLUCOSE BLD-MCNC: 94 MG/DL (ref 70–99)
GLUCOSE SERPL-MCNC: 225 MG/DL (ref 70–99)
HCT VFR BLD CALC: 29.7 % (ref 42–52)
HEMOGLOBIN: 9.3 GM/DL (ref 13.5–18)
IMMATURE NEUTROPHIL %: 0.5 % (ref 0–0.43)
LYMPHOCYTES ABSOLUTE: 0.9 K/CU MM
LYMPHOCYTES RELATIVE PERCENT: 11 % (ref 24–44)
MAGNESIUM: 2.4 MG/DL (ref 1.8–2.4)
MCH RBC QN AUTO: 29.2 PG (ref 27–31)
MCHC RBC AUTO-ENTMCNC: 31.3 % (ref 32–36)
MCV RBC AUTO: 93.4 FL (ref 78–100)
MONOCYTES ABSOLUTE: 0.7 K/CU MM
MONOCYTES RELATIVE PERCENT: 8.9 % (ref 0–4)
NUCLEATED RBC %: 0 %
PDW BLD-RTO: 13.2 % (ref 11.7–14.9)
PHOSPHORUS: 8.2 MG/DL (ref 2.5–4.9)
PLATELET # BLD: 216 K/CU MM (ref 140–440)
PMV BLD AUTO: 10.1 FL (ref 7.5–11.1)
POTASSIUM SERPL-SCNC: 5.1 MMOL/L (ref 3.5–5.1)
RBC # BLD: 3.18 M/CU MM (ref 4.6–6.2)
SEGMENTED NEUTROPHILS ABSOLUTE COUNT: 6.3 K/CU MM
SEGMENTED NEUTROPHILS RELATIVE PERCENT: 76.9 % (ref 36–66)
SODIUM BLD-SCNC: 137 MMOL/L (ref 135–145)
TOTAL IMMATURE NEUTOROPHIL: 0.04 K/CU MM
TOTAL NUCLEATED RBC: 0 K/CU MM
WBC # BLD: 8.2 K/CU MM (ref 4–10.5)

## 2023-02-09 PROCEDURE — 6370000000 HC RX 637 (ALT 250 FOR IP): Performed by: INTERNAL MEDICINE

## 2023-02-09 PROCEDURE — 36415 COLL VENOUS BLD VENIPUNCTURE: CPT

## 2023-02-09 PROCEDURE — 2580000003 HC RX 258: Performed by: INTERNAL MEDICINE

## 2023-02-09 PROCEDURE — 85025 COMPLETE CBC W/AUTO DIFF WBC: CPT

## 2023-02-09 PROCEDURE — 83735 ASSAY OF MAGNESIUM: CPT

## 2023-02-09 PROCEDURE — 94660 CPAP INITIATION&MGMT: CPT

## 2023-02-09 PROCEDURE — 1200000000 HC SEMI PRIVATE

## 2023-02-09 PROCEDURE — 94618 PULMONARY STRESS TESTING: CPT

## 2023-02-09 PROCEDURE — 6360000002 HC RX W HCPCS: Performed by: INTERNAL MEDICINE

## 2023-02-09 PROCEDURE — 84100 ASSAY OF PHOSPHORUS: CPT

## 2023-02-09 PROCEDURE — 82962 GLUCOSE BLOOD TEST: CPT

## 2023-02-09 PROCEDURE — 90935 HEMODIALYSIS ONE EVALUATION: CPT

## 2023-02-09 PROCEDURE — 80048 BASIC METABOLIC PNL TOTAL CA: CPT

## 2023-02-09 PROCEDURE — 94761 N-INVAS EAR/PLS OXIMETRY MLT: CPT

## 2023-02-09 RX ADMIN — SEVELAMER CARBONATE 800 MG: 800 TABLET, FILM COATED ORAL at 12:35

## 2023-02-09 RX ADMIN — SEVELAMER CARBONATE 800 MG: 800 TABLET, FILM COATED ORAL at 17:09

## 2023-02-09 RX ADMIN — HEPARIN SODIUM 5000 UNITS: 5000 INJECTION INTRAVENOUS; SUBCUTANEOUS at 17:09

## 2023-02-09 RX ADMIN — SODIUM CHLORIDE, PRESERVATIVE FREE 10 ML: 5 INJECTION INTRAVENOUS at 12:36

## 2023-02-09 RX ADMIN — PANTOPRAZOLE SODIUM 40 MG: 40 TABLET, DELAYED RELEASE ORAL at 17:10

## 2023-02-09 RX ADMIN — ATORVASTATIN CALCIUM 40 MG: 40 TABLET, FILM COATED ORAL at 20:17

## 2023-02-09 RX ADMIN — INSULIN LISPRO 12 UNITS: 100 INJECTION, SOLUTION INTRAVENOUS; SUBCUTANEOUS at 07:42

## 2023-02-09 RX ADMIN — OXYCODONE AND ACETAMINOPHEN 1 TABLET: 5; 325 TABLET ORAL at 22:32

## 2023-02-09 RX ADMIN — CLOPIDOGREL BISULFATE 75 MG: 75 TABLET ORAL at 12:35

## 2023-02-09 RX ADMIN — INSULIN LISPRO 12 UNITS: 100 INJECTION, SOLUTION INTRAVENOUS; SUBCUTANEOUS at 17:05

## 2023-02-09 RX ADMIN — PANTOPRAZOLE SODIUM 40 MG: 40 TABLET, DELAYED RELEASE ORAL at 05:54

## 2023-02-09 RX ADMIN — HEPARIN SODIUM 5000 UNITS: 5000 INJECTION INTRAVENOUS; SUBCUTANEOUS at 20:25

## 2023-02-09 RX ADMIN — RANOLAZINE 500 MG: 500 TABLET, EXTENDED RELEASE ORAL at 20:17

## 2023-02-09 RX ADMIN — ARIPIPRAZOLE 5 MG: 5 TABLET ORAL at 20:17

## 2023-02-09 RX ADMIN — RANOLAZINE 500 MG: 500 TABLET, EXTENDED RELEASE ORAL at 12:35

## 2023-02-09 RX ADMIN — HEPARIN SODIUM 5000 UNITS: 5000 INJECTION INTRAVENOUS; SUBCUTANEOUS at 05:54

## 2023-02-09 RX ADMIN — SODIUM CHLORIDE, PRESERVATIVE FREE 10 ML: 5 INJECTION INTRAVENOUS at 20:17

## 2023-02-09 RX ADMIN — ONDANSETRON 4 MG: 4 TABLET, ORALLY DISINTEGRATING ORAL at 12:31

## 2023-02-09 RX ADMIN — CALCITRIOL CAPSULES 0.25 MCG 0.5 MCG: 0.25 CAPSULE ORAL at 20:17

## 2023-02-09 RX ADMIN — OXYCODONE AND ACETAMINOPHEN 1 TABLET: 5; 325 TABLET ORAL at 12:31

## 2023-02-09 RX ADMIN — CALCITRIOL CAPSULES 0.25 MCG 0.5 MCG: 0.25 CAPSULE ORAL at 12:35

## 2023-02-09 RX ADMIN — CARVEDILOL 12.5 MG: 6.25 TABLET, FILM COATED ORAL at 17:09

## 2023-02-09 RX ADMIN — INSULIN LISPRO 4 UNITS: 100 INJECTION, SOLUTION INTRAVENOUS; SUBCUTANEOUS at 07:43

## 2023-02-09 ASSESSMENT — ENCOUNTER SYMPTOMS
ABDOMINAL DISTENTION: 0
PHOTOPHOBIA: 0
EYE DISCHARGE: 0
DIARRHEA: 0
CONSTIPATION: 0
SORE THROAT: 0
EYE REDNESS: 0
SHORTNESS OF BREATH: 1
TROUBLE SWALLOWING: 0
NAUSEA: 0
VOMITING: 0
SINUS PAIN: 0
CHEST TIGHTNESS: 0
EYE ITCHING: 0
ABDOMINAL PAIN: 0
COUGH: 0
EYE PAIN: 0
BACK PAIN: 0
BLOOD IN STOOL: 0
RHINORRHEA: 0
VOICE CHANGE: 0

## 2023-02-09 ASSESSMENT — PAIN DESCRIPTION - DESCRIPTORS
DESCRIPTORS: ACHING
DESCRIPTORS: THROBBING

## 2023-02-09 ASSESSMENT — PAIN DESCRIPTION - LOCATION
LOCATION: FOOT;BACK
LOCATION: FOOT

## 2023-02-09 ASSESSMENT — PAIN DESCRIPTION - ORIENTATION
ORIENTATION: LEFT

## 2023-02-09 ASSESSMENT — PAIN SCALES - GENERAL
PAINLEVEL_OUTOF10: 2
PAINLEVEL_OUTOF10: 7
PAINLEVEL_OUTOF10: 8
PAINLEVEL_OUTOF10: 7

## 2023-02-09 NOTE — PROGRESS NOTES
Patient's O2 has stayed in the low 90's while awake on 0L. While sleeping, patients O2 goes down to 84% on 0L. 2L NC appplied while patient sleeping.

## 2023-02-09 NOTE — PROGRESS NOTES
Nephrology Progress Note        [address], [address] [place] [address]  Phone: [phone]  Office Hours: 8:30AM - 4:30PM  Monday - Friday 2/9/2023 7:06 AM  Subjective:   Admit Date: 2/7/2023  PCP: Milvia Hoang MD  Interval History: On NC today    Diet: ADULT DIET; Regular; 4 carb choices (60 gm/meal); Low Sodium (2 gm); Low Potassium (Less than 3000 mg/day); Low Phosphorus (Less than 1000 mg)      Data:   Scheduled Meds:   sodium chloride flush  5-40 mL IntraVENous 2 times per day    heparin (porcine)  5,000 Units SubCUTAneous 3 times per day    insulin lispro  0-8 Units SubCUTAneous TID WC    insulin lispro  0-4 Units SubCUTAneous Nightly    amLODIPine  10 mg Oral Daily    ARIPiprazole  5 mg Oral Nightly    atorvastatin  40 mg Oral Nightly    carvedilol  12.5 mg Oral BID WC    clopidogrel  75 mg Oral Daily    pantoprazole  40 mg Oral BID AC    ranolazine  500 mg Oral BID    sevelamer  800 mg Oral TID WC    calcitRIOL  0.5 mcg Oral BID    insulin glargine  35 Units SubCUTAneous Nightly    insulin lispro  12 Units SubCUTAneous TID WC     Continuous Infusions:   sodium chloride      dextrose       PRN Meds:sodium chloride flush, sodium chloride, ondansetron **OR** ondansetron, polyethylene glycol, acetaminophen **OR** acetaminophen, glucose, dextrose bolus **OR** dextrose bolus, glucagon (rDNA), dextrose, oxyCODONE-acetaminophen, traZODone  I/O last 3 completed shifts: In: 500   Out: 3500   No intake/output data recorded.     Intake/Output Summary (Last 24 hours) at 2/9/2023 0706  Last data filed at 2/8/2023 1107  Gross per 24 hour   Intake 500 ml   Output 3500 ml   Net -3000 ml       CBC:   Recent Labs     02/07/23 2117   WBC 8.0   HGB 9.1*          BMP:    Recent Labs     02/07/23 2117 02/08/23  0545    138   K 5.2* 5.4*   CL 99 98*   CO2 19* 19*   BUN 92* 96*   CREATININE 9.9* 11.2*   GLUCOSE 208* 126*     Hepatic:   Recent Labs     02/07/23  2117   AST 12*   ALT 8*   BILITOT 0.2   ALKPHOS 68     Troponin: No results for input(s): TROPONINI in the last 72 hours. BNP: No results for input(s): BNP in the last 72 hours. Lipids: No results for input(s): CHOL, HDL in the last 72 hours. Invalid input(s): LDLCALCU  ABGs:   Lab Results   Component Value Date/Time    PO2ART 119 06/18/2013 01:15 PM    VLD9ESK 47.0 06/18/2013 01:15 PM     INR: No results for input(s): INR in the last 72 hours.     Objective:   Vitals: /67   Pulse 77   Temp 97.3 °F (36.3 °C) (Oral)   Resp 13   Ht 5' 9\" (1.753 m)   Wt 251 lb 8 oz (114.1 kg)   SpO2 98%   BMI 37.14 kg/m²   General appearance: alert and cooperative with exam, in no acute distress  HEENT: normocephalic, atraumatic,   Neck: supple, trachea midline  Lungs: breathing comfortably on nc  Extremities: extremities atraumatic, no cyanosis or edema  Neurologic: alert, oriented, follows commands, interactive    MEDICAL DECISION MAKING     Patient Active Problem List    Diagnosis Date Noted    Fluid overload 02/07/2023    Problem with vascular access 11/26/2022    ESRD (end stage renal disease) ([facility] 09/12/2022    WD - Ulcer of forefoot due to type 2 diabetes mellitus ([facility] 09/01/2022    NSTEMI (non-ST elevated myocardial infarction) ([facility] 08/24/2022    Chest tightness 08/22/2022    CARMEN (acute kidney injury) ([facility] 07/25/2022    Anemia 07/25/2022    Recurrent major depressive disorder, in full remission ([address] 05/18/2021    Generalized anxiety disorder 05/18/2021    Surgical wound dehiscence 02/08/2022    Chest pain 12/21/2021    Other proteinuria     FH: CAD (coronary artery disease) 09/29/2021    ASCVD (arteriosclerotic cardiovascular disease) 09/29/2021    Chronic kidney disease, stage IV (severe) ([address] 09/28/2021    Nephrotic syndrome 09/22/2021    Generalized edema 09/22/2021    Stage 3b chronic kidney disease ([facility] 09/22/2021    Diabetic nephropathy associated with type 2 diabetes mellitus ([facility] 09/22/2021    Hypoalbuminemia 09/22/2021    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 type 2 diabetes mellitus, with muscle involvement without evidence of necrosis ([address] 09/21/2021    Right groin wound 06/09/2021    Ulcer of right groin with fat layer exposed ([address]     Syncope 06/05/2021    Necrotizing fasciitis ([address] 05/24/2021    Morbid obesity with body mass index (BMI) of 40.0 to 44.9 in adult [facility]) 05/21/2021    Irene gangrene     Cellulitis, scrotum 05/13/2021    Acute epididymitis     Cellulitis of left arm 04/03/2021    Cellulitis 03/23/2021    Acute renal failure (ARF) ([address] 08/04/2019    Diabetic foot infection ([address] 03/21/2019    Intractable nausea and vomiting 01/11/2019    Uncontrolled type 2 diabetes mellitus 01/11/2019    Nausea and vomiting 01/11/2019    Constipation 10/07/2018    Cellulitis of left foot     Sepsis ([address] 66/49/9936    Periumbilical hernia     Abscess 12/03/2017    Osteomyelitis ([address] 05/22/2015    Bronchitis 10/15/2013    Hyperlipidemia with target LDL less than 100 07/15/2013    Essential hypertension 07/15/2013    Bilateral carpal tunnel syndrome- left worse than right 06/24/2013    DANIELA (obstructive sleep apnea) 06/20/2013    Urinary frequency 06/20/2013    Neck pain 05/16/2013    Anxiety associated with depression     Panic attack 02/01/2012    Diabetic neuropathy ([address] 12/22/2011    Hiatal hernia 02/04/2011    Diabetes mellitus type 2, improved controlled 02/04/2011     HD planned today     Thank you                  Electronically signed by Erich Gordon DO on 2/9/2023 at 7:06 AM    MD Barbara Pérez DO [address] [facility], [place] [address]  PHONE: [phone]  FAX: [phone]

## 2023-02-09 NOTE — PROGRESS NOTES
This nurse and CECILIA Orr walked patient 50ft with 0L O2. Patient's SpO2 dropped to 87% and HR reached 91bpm. Patient was put on 2L NC and O2 recovered to 95%.

## 2023-02-09 NOTE — PROGRESS NOTES
In-Patient Progress Note    Patient:  Teresa Reid 52 y.o. male MRN: [id number]     Date of Service: 2/9/2023    Hospital Day: 3      Chief complaint: had concerns including Shortness of Breath. Assessment and Plan   Teresa Reid, a 52 y.o. male, with a history of  hypertension, diabetes mellitus type 2, on long-term insulin, hyperlipidemia, depression, insomnia, morbid obesity, coronary artery disease, was admitted on 2/7/2023 with complaints of had concerns including Shortness of Breath. Assessment and plan    #. Acute on chronic hypoxic respiratory failure 2/2 worsening volume overload from missed dialysis. #.  ESRD on HD MWF  Patient reports that he missed his dialysis on Monday, last dialysis was on Friday, February 3; patient tried to see if he could tolerate till Wednesday but unable to tolerate the shortness of breath and presented to the ED on Tuesday night. In the ED, potassium 5.2, CO2 19. Chest x-ray-increased lung markings bilaterally, likely 2/2 pulmonary vascular congestion.  -Nephrology on board  -s/p  HD today  -continue on home calcium and sevelamer     #.  Mild hyperkalemia  -Monitor with repeat BMP. #.  Anion gap metabolic acidosis     #. Hypocalcemia  -Patient received calcium supplementation in ED  -resumed home calcitriol     #. Left plantar foot ulcer  -No drainage noted, foul odor due to poor hygiene.  -Patient follows with wound care/podiatry  -Consult placed to podiatry-Dr. Kimi Mckeon  -Wound care consulted. #.  Coronary artery disease  -History of PCI and stenting to LAD, left circumflex     #. Chronically elevated troponin  -S/p cath-9/2022-patent stents  -Continue Plavix, atorvastatin, carvedilol, ranolazine     #. Chronic normocytic anemia     #. ESRD on HD (initiated 7/2022)  -MWF  -Patient is on sevelamer, calcitriol     #. Hypertension  -Patient is on amlodipine, carvedilol     #.   Diabetes mellitus type 2, on long-term insulin  -Patient is on Lantus 75 units nightly, Humalog 35 units prior to meals, no sliding scale, doesn't measure his glucose at home. Patient is also on alogliptin.  -Blood glucose here have been trending low normal. Will use 35 units lantus nightly,12 units humalog premeal and sliding scale through the day. Adjust as needeed     #. Diabetic foot ulcer  -S/p amputation of the distal phalanx of right great toe-2015  -S/p amputation of the right fourth toe at the metatarsophalangeal joint-8/2019     #. Suspected DANIELA  -Patient was noted to be desaturating to 70s while sleeping. Oxygen saturation improves when patient is awake.  -apnea link  -NIV/ CPAP autotiration overnight    #. 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 Hyperlipidemia-on atorvastatin     #. Depression/insomnia  -Patient is on Abilify, trazodone     #. Chronic pain-Percocet. #.  Morbid obesity with BMI 42.83. # Peptic ulcer prophylaxis: Pantoprazole  # DVT Prophylaxis: Heparin subcutaneous  #CODE STATUS: full code      Current living situation: Home  Expected Disposition: Home  Estimated discharge date: 1 to 2 days      Review of System     Review of Systems   Constitutional:  Positive for fatigue. Negative for activity change, appetite change, chills and fever. HENT:  Negative for congestion, ear discharge, ear pain, hearing loss, nosebleeds, postnasal drip, rhinorrhea, sinus pain, sore throat, trouble swallowing and voice change. Eyes:  Negative for photophobia, pain, discharge, redness and itching. Respiratory:  Positive for shortness of breath. Negative for cough and chest tightness. Cardiovascular:  Positive for leg swelling. Negative for chest pain and palpitations. Gastrointestinal:  Negative for abdominal distention, abdominal pain, blood in stool, constipation, diarrhea, nausea and vomiting. Endocrine: Negative for cold intolerance, heat intolerance, polydipsia, polyphagia and polyuria.    Genitourinary:  Negative for difficulty urinating, dysuria, flank pain, frequency, hematuria and urgency. Musculoskeletal:  Negative for arthralgias, back pain, gait problem, joint swelling and myalgias. Skin:  Negative for pallor, rash and wound. Allergic/Immunologic: Negative for environmental allergies and food allergies. Neurological:  Negative for dizziness, tremors, seizures, syncope, speech difficulty, weakness, light-headedness, numbness and headaches. Hematological:  Negative for adenopathy. Does not bruise/bleed easily. Psychiatric/Behavioral:  Negative for agitation, confusion, decreased concentration, hallucinations, self-injury, sleep disturbance and suicidal ideas. The patient is not nervous/anxious and is not hyperactive. I have reviewed all pertinent PMHx, PSHx, FamHx, SocialHx, medications, and allergies and updated history as appropriate. Physical Exam   VITAL SIGNS:  /67   Pulse 77   Temp 97.3 °F (36.3 °C) (Oral)   Resp 13   Ht 5' 9\" (1.753 m)   Wt 251 lb 8 oz (114.1 kg)   SpO2 98%   BMI 37.14 kg/m²   Tmax over 24 hours:  Temp (24hrs), Av.3 °F (36.3 °C), Min:96.8 °F (36 °C), Max:97.9 °F (36.6 °C)      Patient Vitals for the past 6 hrs:   BP Temp Temp src Pulse Resp SpO2   23 0345 138/67 97.3 °F (36.3 °C) Oral 77 13 98 %   23 0202 123/75 -- -- 79 16 93 %           Intake/Output Summary (Last 24 hours) at 2023 0756  Last data filed at 2023 1107  Gross per 24 hour   Intake 500 ml   Output 3500 ml   Net -3000 ml       Wt Readings from Last 2 Encounters:   23 251 lb 8 oz (114.1 kg)   22 282 lb (127.9 kg)     Body mass index is 37.14 kg/m². Physical Exam  Constitutional:       General: He is not in acute distress. Appearance: Normal appearance. He is normal weight. He is not ill-appearing, toxic-appearing or diaphoretic. HENT:      Head: Normocephalic and atraumatic. Right Ear: Tympanic membrane, ear canal and external ear normal. There is no impacted cerumen.       Left Ear: Tympanic membrane, ear canal and external ear normal. There is no impacted cerumen. Nose: Nose normal. No congestion or rhinorrhea. Mouth/Throat:      Mouth: Mucous membranes are moist.      Pharynx: No oropharyngeal exudate or posterior oropharyngeal erythema. Eyes:      General: No scleral icterus. Right eye: No discharge. Left eye: No discharge. Extraocular Movements: Extraocular movements intact. Conjunctiva/sclera: Conjunctivae normal.      Pupils: Pupils are equal, round, and reactive to light. Neck:      Vascular: No carotid bruit. Cardiovascular:      Rate and Rhythm: Normal rate and regular rhythm. Pulses: Normal pulses. Heart sounds: Normal heart sounds. No murmur heard. No friction rub. No gallop. Pulmonary:      Effort: Pulmonary effort is normal. No respiratory distress. Breath sounds: Normal breath sounds. No stridor. No wheezing or rhonchi. Abdominal:      General: Abdomen is flat. Bowel sounds are normal. There is no distension. Palpations: Abdomen is soft. Tenderness: There is no abdominal tenderness. Musculoskeletal:         General: No swelling, tenderness, deformity or signs of injury. Normal range of motion. Cervical back: Normal range of motion and neck supple. No rigidity or tenderness. Right lower leg: No edema. Left lower leg: No edema. Lymphadenopathy:      Cervical: No cervical adenopathy. Skin:     General: Skin is warm. Capillary Refill: Capillary refill takes less than 2 seconds. Coloration: Skin is not jaundiced or pale. Findings: No bruising or erythema. Neurological:      General: No focal deficit present. Mental Status: He is alert and oriented to person, place, and time. Cranial Nerves: No cranial nerve deficit. Sensory: No sensory deficit. Motor: No weakness.       Coordination: Coordination normal.      Gait: Gait normal.      Deep Tendon Reflexes: Reflexes normal.   Psychiatric:         Mood and Affect: Mood normal.         Behavior: Behavior normal.         Thought Content: Thought content normal.         Judgment: Judgment normal.         Current Medications      sodium chloride flush  5-40 mL IntraVENous 2 times per day    heparin (porcine)  5,000 Units SubCUTAneous 3 times per day    insulin lispro  0-8 Units SubCUTAneous TID     insulin lispro  0-4 Units SubCUTAneous Nightly    amLODIPine  10 mg Oral Daily    ARIPiprazole  5 mg Oral Nightly    atorvastatin  40 mg Oral Nightly    carvedilol  12.5 mg Oral BID WC    clopidogrel  75 mg Oral Daily    pantoprazole  40 mg Oral BID AC    ranolazine  500 mg Oral BID    sevelamer  800 mg Oral TID WC    calcitRIOL  0.5 mcg Oral BID    insulin glargine  35 Units SubCUTAneous Nightly    insulin lispro  12 Units SubCUTAneous TID          Labs and Imaging Studies   Laboratory findings:  XR CHEST PORTABLE    Result Date: 2/7/2023  EXAMINATION: ONE XRAY VIEW OF THE CHEST 2/7/2023 9:24 pm COMPARISON: November 26, 2022 HISTORY: ORDERING SYSTEM PROVIDED HISTORY: sob TECHNOLOGIST PROVIDED HISTORY: Reason for exam:->sob Reason for Exam: sob FINDINGS: There is a right subclavian catheter with its tip at the superior cavoatrial junction. The cardiomediastinal silhouette is stable. There are increased lung markings bilaterally, may be related to mild pulmonary vascular congestion versus bronchitis. There is no pleural effusion. There is no pneumothorax. There is no acute osseous abnormality. Increased lung markings bilaterally, may be related to mild pulmonary vascular congestion versus bronchitis.        Recent Results (from the past 24 hour(s))   POCT Glucose    Collection Time: 02/08/23 11:43 AM   Result Value Ref Range    POC Glucose 127 (H) 70 - 99 MG/DL   POCT Glucose    Collection Time: 02/08/23  5:37 PM   Result Value Ref Range    POC Glucose 233 (H) 70 - 99 MG/DL   POCT Glucose    Collection Time: 02/08/23 10:24 PM   Result Value Ref Range    POC Glucose 204 (H) 70 - 99 MG/DL   POCT Glucose    Collection Time: 02/09/23  6:41 AM   Result Value Ref Range    POC Glucose 252 (H) 70 - 99 MG/DL           Electronically signed by Andrea Good MD on 2/9/2023 at 7:56 AM      Comment: Please note this report has been produced using speech recognition software and may contain errors related to that system including errors in grammar, punctuation, and spelling, as well as words and phrases that may be inappropriate. If there are any questions or concerns please feel free to contact the dictating provider for clarification.

## 2023-02-09 NOTE — CARE COORDINATION
Discussed pt in IDR.  plans to d/c pt tomorrow after Dialysis. MARIAJOSE asked what time his chair time is at [facility] and he informed CM that it is at 1145. Sent a PS to Dr Jude Prieto asking if he could d/c pt in the am so pt can go to his scheduled chair time at [facility] instead of having dialysis here tomorrow. Dr Jude Prieto notified  that he will discuss it with Dr Marcial Sarabia.    TE

## 2023-02-09 NOTE — PROGRESS NOTES
2/9/2023 12:48 PM  Patient Room #: 8809/3386-U  Patient Name: Roxie Narvaez    (Step 1 Done by RN if possible otherwise call Pulmonary Diagnostics)  Place patient on room air at rest for at least 30 minutes. If patient falls below 88% before 30 minutes then you can record the level and stop. Record room air saturation level 87 %. If patient is at 88% or below, they will qualify for home oxygen and you can stop. If level does not fall below 88%, fill in level above. If indicated continue to Step 2. Signature:_Sammy Lundy RRT Date: 02/10/2023___  (Step 2&3 Done by [facility])  Ambulate patient on room air until saturation falls below 89%. Record level of room air saturation with ambulation_87_ %. Next, place patient back on _2_lpm oxygen and ambulate, record level  95_%. (Note:  this level must show improvement from room air level done with ambulation.)  If patients saturation on room air with ambulation is 88% or below AND patient shows improvement with oxygen during ambulation, they will qualify for home oxygen and you can stop. If patient does not drop below 89%, then patient should have an overnight oximetry trending on room air to see if level falls below 88%. Complete level in Step 3 below. Room air overnight oximetry level 88 % for_OSA__  cumulative minutes. If patients room air oxygen level is < 89% for at least 5 cumulative minutes, patient will qualify for home oxygen and you can stop. (Attach Night Trending Report)    Complete order below: Diagnosis:_ CAD  Home oxygen at:  Length of Need: ?X Lifetime ?  3 Months     _2_lpm or __%   via  [x] nasal cannula  []mask  [] other         [x]continuous [x]  with activity  [x]  Nocturnal   [x] Portable Tanks [x]  Concentrator  [] Conserving Device        Therapist Signature:_Karla Carey, RRT     Date:  2/10/2023__  Physician Signature:  __Electronically Signed in EMR_    Date:___  Physician Printed Name:  Danette Rivera MD  NPI: [id number]    [x] Patient Qualifies      [] Patient Does NOT qualify

## 2023-02-09 NOTE — PROGRESS NOTES
Patient tolerated 3hr hemodialysis treatment well today. 1.5L of fluid was removed via HD CVC in right subclavian. Pt did become hypotensive during txt and UF turned off early. No other s/s of distress noted. Pt had no complaints. At the end of txt blood was rinsed back to pt successfully. CVC lines were flushed and locked with saline,then capped. HD txt overseen by TOBIAS Page RN        Patient Name: Finn Purvis  Patient : 1975  MRN: [id number]     Acct: [de-identified]  Date of Admission: 2023  Room/Bed: 3108/3108-A  Code Status:  Full Code  Allergies:    Allergies   Allergen Reactions    Adhesive Tape Rash    Doxycycline Nausea And Vomiting    Reglan [Metoclopramide] Anxiety     Diagnosis:    Patient Active Problem List   Diagnosis    Hiatal hernia    Diabetes mellitus type 2, improved controlled    Diabetic neuropathy (HCC)    Panic attack    Anxiety associated with depression    Neck pain    DANIELA (obstructive sleep apnea)    Urinary frequency    Bilateral carpal tunnel syndrome- left worse than right    Hyperlipidemia with target LDL less than 100    Essential hypertension    Bronchitis    Osteomyelitis (HCC)    Abscess    Periumbilical hernia    Sepsis (HCC)    Cellulitis of left foot    Constipation    Intractable nausea and vomiting    Uncontrolled type 2 diabetes mellitus    Nausea and vomiting    Diabetic foot infection (HCC)    Acute renal failure (ARF) (HCC)    Cellulitis    Cellulitis of left arm    Cellulitis, scrotum    Acute epididymitis    Irene gangrene    Recurrent major depressive disorder, in full remission (HCC)    Generalized anxiety disorder    Morbid obesity with body mass index (BMI) of 40.0 to 44.9 in adult [facility])    Necrotizing fasciitis (HCC)    Syncope    Right groin wound    Ulcer of right groin with fat layer exposed ([address]    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 type 2 diabetes mellitus, with muscle involvement without evidence of necrosis ([address] Nephrotic syndrome    Generalized edema    Stage 3b chronic kidney disease (HCC)    Diabetic nephropathy associated with type 2 diabetes mellitus ([facility])    Hypoalbuminemia    Chronic kidney disease, stage IV (severe) ([facility])    FH: CAD (coronary artery disease)    ASCVD (arteriosclerotic cardiovascular disease)    Other proteinuria    Chest pain    Surgical wound dehiscence    CARMEN (acute kidney injury) ([facility])    Anemia    Chest tightness    NSTEMI (non-ST elevated myocardial infarction) ([facility])    WD - Ulcer of forefoot due to type 2 diabetes mellitus ([facility]    ESRD (end stage renal disease) ([facility]    Problem with vascular access    Fluid overload         Treatment:  Hemodilaysis 2:1  Priority: Routine  Location: Acute Room    Diabetic: Yes  NPO: No  Isolation Precautions: None     Consent for Treatment Verified: Yes  Blood Consent Verified: Not Applicable     Safety Verified: Identify (I), Consent (C), Equipment (E), HepB Status (B), Orders Complete (O), Access Verified (A), and Timeliness (T)  Time out performed prior to access at 0830 hours. Report Received from Primary RN at 0740 hours. Primary RN (First Initial, Last Name, Title): Chidi Soto RN  Incapacitated Nurse Education Completed: Not Applicable     HBsAg ONLY:  Date Drawn: February 3, 2023       Results: Negative  HBsAb:  Date Drawn:  February 9, 2023       Results: Unknown    Order  Dialysis Bath  K+ (Potassium): 2  Ca+ (Calcium):  (3.5)  Na+ (Sodium): 138  HCO3 (Bicarb): 35  Bicarbonate Concentrate Lot No.: [id number]  Acid Concentrate Lot No.: [id number]     Na+ Modeling: Not Applicable  Dialyzer: [id number]  Dialysate Temperature (C):  Not Applicable  Blood Flow Rate (BFR):  200   Dialysate Flow Rate (DFR): Not Applicable        Access to be Utilized   Access:  Tunneled Catheter  Location: Subclavian  Side: Right   Needle gauge:  Not Applicable  + Bruit/Thrill: Not Applicable    First Use X-ray Verified: Yes  OK to use line order: Yes    Site Assessment:  Signs and Symptoms of Infection/Inflammation: None  If yes: Not Applicable  Dressing: Dry and Intact  Site Prep: Medical Aseptic Technique  Dressing Changed this Treatment: No  If yes, by whom: NA - not changed today  Date of Last Dressing Change: February 8, 2023  Antimicrobial Patch in place?: Yes  Red Alcohol Caps in place?: Yes  Gauze Dressing?: No  Non Dialysis Use?: No  Comment:    Flows: Good, Patent  If access problem, who was notified:     Pre and Post-Assessment  Patient Vitals for the past 8 hrs:   Pain Level   02/09/23 0747 7     Labs  Recent Labs     02/07/23 2117   WBC 8.0   HGB 9.1*   HCT 28.3*                                                                     Recent Labs     02/07/23  2117 02/08/23  0545 02/09/23  0631    138 137   K 5.2* 5.4* 5.1   CL 99 98* 101   CO2 19* 19* 22   BUN 92* 96* 64*   CREATININE 9.9* 11.2* 8.6*   GLUCOSE 208* 126* 225*     IV Drips and Rate/Dose   sodium chloride      dextrose        Safety - Before each treatment:   Dialysis Machine No.: [id number]   Machine Number: [id number]  Dialyzer Lot No.: [id number]  RO Machine Log Sheet Completed: Yes  Machine Alarm Self Test: Completed; Passed (02/09/23 0830)     Air Foam Detector: Tested, Proper Function  Extracorporeal Circuit Tested for Integrity: Yes  Machine Conductivity: 14  Manual Conductivity: 14     Bicarbonate Concentrate Lot No.: [id number]  Acid Concentrate Lot No.: [id number]  Manual Ph: 7.4  Bleach Test (Neg): Yes  Bath Temperature: 95 °F (35 °C)  Tubing Lot#: [id number]  Conductivity Meter Serial #: [id number]  All Connections Secure?: Yes  Venous Parameters Set?: Yes  Arterial Parameters Set?: Yes  Saline Line Double Clamped?: Yes  Air Foam Detector Engaged?: Yes  Machine Functioning Alarm Free?  Yes  Prime Given: 200ml    Chlorine Testing - Before each treatment and every 4 hours:   Treatment  Treatment Number: 2  Time On: 1147  Time Off: 1135  Treatment Goal: 2L in 3hr  Weight: 278 lb (126.1 kg) (02/09/23 1135)  1st check: less than 0.1 ppm at: 0645 hours  2nd check: less than 0.1 ppm at: 1040 hours  3rd check: Not Applicable  (if greater than 0.1 ppm, then check every 30 minutes from secondary)    Access Flows and Pressures  Patient Vitals for the past 8 hrs:   Blood Flow Rate (ml/min) Ultrafiltration Rate (ml/hr) Arterial Pressure (mmHg) Venous Pressure (mmHg) TMP Comments Access Visible   02/09/23 0835 200 ml/min 830 ml/hr -30 mmHg 60 40 txt started Yes   02/09/23 0845 200 ml/min 830 ml/hr -40 mmHg 60 40 pt resting Yes   02/09/23 0900 200 ml/min 830 ml/hr -30 mmHg 50 50 lines secure Yes   02/09/23 0915 200 ml/min 830 ml/hr -40 mmHg 50 50 no distress Yes   02/09/23 0930 200 ml/min 830 ml/hr -30 mmHg 60 50 no complaints Yes   02/09/23 0945 200 ml/min 830 ml/hr -40 mmHg 70 40 resting with eyes closed Yes   02/09/23 1000 200 ml/min 830 ml/hr -40 mmHg 60 40 no change in condition Yes   02/09/23 1015 200 ml/min 830 ml/hr -30 mmHg 60 50 denies needs Yes   02/09/23 1030 200 ml/min 830 ml/hr -40 mmHg 60 50 lines secure Yes   02/09/23 1045 200 ml/min 830 ml/hr -40 mmHg 60 50 gave warm blanket Yes   02/09/23 1100 200 ml/min 830 ml/hr -40 mmHg 60 50 bp dropping, may turn uf off in 15min Yes   02/09/23 1115 200 ml/min 830 ml/hr -30 mmHg 60 30 UF off, no distress Yes   02/09/23 1130 200 ml/min 830 ml/hr -30 mmHg 60 30 resting with eyes closed Yes   02/09/23 1135 200 ml/min 0 ml/hr -30 mmHg 60 30 tx ended, rinseback given Yes     Vital Signs  Patient Vitals for the past 8 hrs:   BP Temp Pulse Resp SpO2 Weight Weight Method Percent Weight Change   02/09/23 0835 (!) 120/56 97.9 °F (36.6 °C) 76 18 -- 287 lb 4.8 oz (130.3 kg) Bed scale 14.23   02/09/23 0845 135/88 -- 74 -- -- -- -- --   02/09/23 0900 107/85 -- 75 -- -- -- -- --   02/09/23 0915 (!) 131/101 -- 75 -- -- -- -- --   02/09/23 0930 122/87 -- 74 -- -- -- -- --   02/09/23 0945 123/81 -- 74 -- -- -- -- --   02/09/23 1000 108/72 -- 73 -- -- -- -- --   02/09/23 1015 105/67 -- 73 -- -- -- -- --   02/09/23 1030 96/63 -- 72 -- -- -- -- --   02/09/23 1045 91/63 -- 72 -- -- -- -- --   02/09/23 1100 90/60 -- 71 -- -- -- -- --   02/09/23 1115 105/69 -- 72 -- -- -- -- --   02/09/23 1130 113/72 -- 72 -- -- -- -- --   02/09/23 1135 107/70 97.9 °F (36.6 °C) 71 15 97 % 278 lb (126.1 kg) Bed scale -3.24     Post-Dialysis  Arterial Catheter Locking Solution:  saline  Venous Catheter Locking Solution:  saline  Post-Treatment Procedures: Blood returned, Catheter Capped, clamped with Saline x2 ports  Machine Disinfection Process: Acid/Vinegar Clean, Bleach, Exterior Machine Disinfection  Rinseback Volume (ml): 300 ml  Blood Volume Processed (Liters): 0 l/min  Dialyzer Clearance: Heavily streaked  Duration of Treatment (minutes): 180 minutes     Hemodialysis Intake (ml): 500 ml  Hemodialysis Output (ml): 2021 ml     Tolerated Treatment: Good  Patient Response to Treatment: tolerated well  Physician Notified: No       Provider Notification        Handoff complete and report given to Primary RN at 1140 hours.   Primary RN (First Initial, Last Name, Title):  Nabila Jones RN     Education  Person Educated: Patient   Knowledge Base: Substantial  Barriers to Learning?: None  Preferred method of Learning: Oral  Topic(s): Access Care, Signs and Symptoms of Infection, and Fluid Management   Teaching Tools: Explanation   Response to Education: Verbalized Understanding     Electronically signed by Shelly Napier LPN on 2/3/5886 at 80:90 PM

## 2023-02-10 VITALS
OXYGEN SATURATION: 95 % | DIASTOLIC BLOOD PRESSURE: 78 MMHG | WEIGHT: 287 LBS | SYSTOLIC BLOOD PRESSURE: 154 MMHG | RESPIRATION RATE: 18 BRPM | TEMPERATURE: 97.9 F | HEART RATE: 77 BPM | HEIGHT: 69 IN | BODY MASS INDEX: 42.51 KG/M2

## 2023-02-10 PROBLEM — I25.10 CAD (CORONARY ARTERY DISEASE): Status: ACTIVE | Noted: 2023-02-10

## 2023-02-10 PROBLEM — J96.21 ACUTE ON CHRONIC RESPIRATORY FAILURE WITH HYPOXIA (HCC): Status: ACTIVE | Noted: 2023-02-10

## 2023-02-10 LAB
ANION GAP SERPL CALCULATED.3IONS-SCNC: 14 MMOL/L (ref 4–16)
BUN SERPL-MCNC: 78 MG/DL (ref 6–23)
CALCIUM SERPL-MCNC: 7.2 MG/DL (ref 8.3–10.6)
CHLORIDE BLD-SCNC: 101 MMOL/L (ref 99–110)
CO2: 22 MMOL/L (ref 21–32)
CREAT SERPL-MCNC: 9.1 MG/DL (ref 0.9–1.3)
GFR SERPL CREATININE-BSD FRML MDRD: 7 ML/MIN/1.73M2
GLUCOSE BLD-MCNC: 128 MG/DL (ref 70–99)
GLUCOSE BLD-MCNC: 133 MG/DL (ref 70–99)
GLUCOSE SERPL-MCNC: 139 MG/DL (ref 70–99)
MAGNESIUM: 2.5 MG/DL (ref 1.8–2.4)
PHOSPHORUS: 9.3 MG/DL (ref 2.5–4.9)
POTASSIUM SERPL-SCNC: 5.6 MMOL/L (ref 3.5–5.1)
SODIUM BLD-SCNC: 137 MMOL/L (ref 135–145)

## 2023-02-10 PROCEDURE — 84100 ASSAY OF PHOSPHORUS: CPT

## 2023-02-10 PROCEDURE — 6360000002 HC RX W HCPCS: Performed by: INTERNAL MEDICINE

## 2023-02-10 PROCEDURE — 36415 COLL VENOUS BLD VENIPUNCTURE: CPT

## 2023-02-10 PROCEDURE — 2700000000 HC OXYGEN THERAPY PER DAY

## 2023-02-10 PROCEDURE — 6370000000 HC RX 637 (ALT 250 FOR IP): Performed by: INTERNAL MEDICINE

## 2023-02-10 PROCEDURE — 83735 ASSAY OF MAGNESIUM: CPT

## 2023-02-10 PROCEDURE — 80048 BASIC METABOLIC PNL TOTAL CA: CPT

## 2023-02-10 PROCEDURE — 94761 N-INVAS EAR/PLS OXIMETRY MLT: CPT

## 2023-02-10 PROCEDURE — 94664 DEMO&/EVAL PT USE INHALER: CPT

## 2023-02-10 PROCEDURE — 90935 HEMODIALYSIS ONE EVALUATION: CPT

## 2023-02-10 PROCEDURE — 94660 CPAP INITIATION&MGMT: CPT

## 2023-02-10 PROCEDURE — 82962 GLUCOSE BLOOD TEST: CPT

## 2023-02-10 RX ADMIN — OXYCODONE AND ACETAMINOPHEN 1 TABLET: 5; 325 TABLET ORAL at 13:46

## 2023-02-10 RX ADMIN — CLOPIDOGREL BISULFATE 75 MG: 75 TABLET ORAL at 13:47

## 2023-02-10 RX ADMIN — SEVELAMER CARBONATE 800 MG: 800 TABLET, FILM COATED ORAL at 13:47

## 2023-02-10 RX ADMIN — RANOLAZINE 500 MG: 500 TABLET, EXTENDED RELEASE ORAL at 13:47

## 2023-02-10 RX ADMIN — HEPARIN SODIUM 5000 UNITS: 5000 INJECTION INTRAVENOUS; SUBCUTANEOUS at 13:47

## 2023-02-10 RX ADMIN — PANTOPRAZOLE SODIUM 40 MG: 40 TABLET, DELAYED RELEASE ORAL at 05:56

## 2023-02-10 RX ADMIN — HEPARIN SODIUM 5000 UNITS: 5000 INJECTION INTRAVENOUS; SUBCUTANEOUS at 05:56

## 2023-02-10 RX ADMIN — ONDANSETRON 4 MG: 2 INJECTION INTRAMUSCULAR; INTRAVENOUS at 09:20

## 2023-02-10 RX ADMIN — CALCITRIOL CAPSULES 0.25 MCG 0.5 MCG: 0.25 CAPSULE ORAL at 13:46

## 2023-02-10 RX ADMIN — AMLODIPINE BESYLATE 10 MG: 10 TABLET ORAL at 13:46

## 2023-02-10 ASSESSMENT — PAIN DESCRIPTION - LOCATION: LOCATION: GENERALIZED

## 2023-02-10 ASSESSMENT — PAIN DESCRIPTION - DESCRIPTORS: DESCRIPTORS: ACHING;DISCOMFORT

## 2023-02-10 ASSESSMENT — PAIN SCALES - GENERAL: PAINLEVEL_OUTOF10: 8

## 2023-02-10 ASSESSMENT — PAIN - FUNCTIONAL ASSESSMENT: PAIN_FUNCTIONAL_ASSESSMENT: ACTIVITIES ARE NOT PREVENTED

## 2023-02-10 NOTE — PLAN OF CARE
Problem: Discharge Planning  Goal: Discharge to home or other facility with appropriate resources  2/9/2023 2016 by Licha White RN  Outcome: Progressing  2/9/2023 1302 by Mitzi Hassan RN  Outcome: Progressing  Flowsheets (Taken 2/9/2023 7704)  Discharge to home or other facility with appropriate resources:   Identify barriers to discharge with patient and caregiver   Arrange for needed discharge resources and transportation as appropriate   Identify discharge learning needs (meds, wound care, etc)   Arrange for interpreters to assist at discharge as needed   Refer to discharge planning if patient needs post-hospital services based on physician order or complex needs related to functional status, cognitive ability or social support system     Problem: Pain  Goal: Verbalizes/displays adequate comfort level or baseline comfort level  2/9/2023 2016 by Licha White RN  Outcome: Progressing  2/9/2023 1302 by Mitzi Hassan RN  Outcome: Progressing     Problem: Safety - Adult  Goal: Free from fall injury  2/9/2023 2016 by Licha White RN  Outcome: Progressing  2/9/2023 1302 by Mitzi Hassan RN  Outcome: Progressing     Problem: Chronic Conditions and Co-morbidities  Goal: Patient's chronic conditions and co-morbidity symptoms are monitored and maintained or improved  2/9/2023 2016 by Licha White RN  Outcome: Progressing  2/9/2023 1302 by Mitzi Hassan RN  Outcome: Progressing  Flowsheets (Taken 2/9/2023 2664)  Care Plan - Patient's Chronic Conditions and Co-Morbidity Symptoms are Monitored and Maintained or Improved:   Monitor and assess patient's chronic conditions and comorbid symptoms for stability, deterioration, or improvement   Collaborate with multidisciplinary team to address chronic and comorbid conditions and prevent exacerbation or deterioration   Update acute care plan with appropriate goals if chronic or comorbid symptoms are exacerbated and prevent overall improvement and discharge

## 2023-02-10 NOTE — PROGRESS NOTES
Pt qualified for home oxygen. Paperwork faxed to [employer]. Please do not discharge pt without oxygen. This testing will  and have to be repeated if pt has not discharged 48 hours from time testing was ordered. Please call [facility] @ [phone] if oxygen has not been delivered prior to pt discharging. Thanks.

## 2023-02-10 NOTE — DISCHARGE SUMMARY
Discharge Summary    Name:  Natanael Naik /Age/Sex: 1975  (52 y.o. male)   MRN & CSN:  [id number] & [id number] Admission Date/Time: 2023  8:33 PM   Attending:  Kathi Sellers MD Discharging Physician: Kathi Sellers MD     Discharge diagnosis and plan:    #. Acute on chronic hypoxic respiratory failure 2/2 worsening volume overload from missed dialysis. #.  ESRD on HD MWF  Patient reports that he missed his dialysis on Monday, last dialysis was on Friday, February 3; patient tried to see if he could tolerate till Wednesday but unable to tolerate the shortness of breath and presented to the ED on Tuesday night. In the ED, potassium 5.2, CO2 19. Chest x-ray-increased lung markings bilaterally, likely 2/2 pulmonary vascular congestion. Received daily dialysis x 3 days. Improved shortness of breath. But not able to wean off of oxygen - desaturated on ambulation, likely chronic respiratory failure in view of multiple comorbidity playing a role. He will resume his HD MWF. Advised to reach out to HD center for catch up session if he misses dialysis in the future. #.  Mild hyperkalemia, resolved post dialysis     #. Anion gap metabolic acidosis, resolved post dialysis     #. Hypocalcemia, stable  -Patient received calcium supplementation in ED  -resume home calcitriol     #. Left plantar foot ulcer  -No drainage noted, foul odor due to poor hygiene.  -Patient follows with wound care/podiatry  -Consult placed to podiatry-Dr. Sadi Pereyra- not available inpatient  -Wound care consulted - follow outpatient     #. Coronary artery disease  -History of PCI and stenting to LAD, left circumflex  -reports history of nightly hypoxemia  -qualifies for home O2 on evalution    Patient was seen in hospital for   CAD . I am prescribing oxygen because the diagnosis and testing requires the patient to have oxygen in the home. Conditions will improve or be benefited by oxygen use.  The patient is able to perform good mobility and therefore requires the use of a portable oxygen system for ambulation. #.  Chronically elevated troponin  -S/p cath-9/2022-patent stents  -Continue Plavix, atorvastatin, carvedilol, ranolazine     #. Chronic normocytic anemia     #. ESRD on HD (initiated 7/2022)  -MWF  -Patient is on sevelamer, calcitriol     #. Hypertension  -Patient is on amlodipine, carvedilol     #. Diabetes mellitus type 2, on long-term insulin  -Patient is on Lantus 75 units nightly, Humalog 35 units prior to meals, no sliding scale, doesn't measure his glucose at home. Patient is also on alogliptin.  -Blood glucose here have been trending low normal. Will use 35 units lantus nightly,12 units humalog premeal and sliding scale through the day. Adjust as needeed     #. Diabetic foot ulcer  -S/p amputation of the distal phalanx of right great toe-2015  -S/p amputation of the right fourth toe at the metatarsophalangeal joint-8/2019     #. Suspected DANIELA  -Patient was noted to be desaturating to 70s while sleeping. Oxygen saturation improves when patient is awake.  -apnea link  -NIV/ CPAP autotiration overnight     #. 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 Hyperlipidemia-on atorvastatin     #. Depression/insomnia  -Patient is on Abilify, trazodone     #. Chronic pain-Percocet. #.  Morbid obesity with BMI 42.83. Discharge Exam  BP (!) 154/78   Pulse 77   Temp 97.9 °F (36.6 °C) (Oral)   Resp 18   Ht 5' 9\" (1.753 m)   Wt 287 lb (130.2 kg)   SpO2 95%   BMI 42.38 kg/m²     Physical Exam  Constitutional:       General: He is not in acute distress. Appearance: Normal appearance. He is normal weight. He is not ill-appearing, toxic-appearing or diaphoretic. HENT:      Head: Normocephalic and atraumatic. Right Ear: Tympanic membrane, ear canal and external ear normal. There is no impacted cerumen. Left Ear: Tympanic membrane, ear canal and external ear normal. There is no impacted cerumen.       Nose: Nose normal. No congestion or rhinorrhea. Mouth/Throat:      Mouth: Mucous membranes are moist.      Pharynx: No oropharyngeal exudate or posterior oropharyngeal erythema. Eyes:      General: No scleral icterus. Right eye: No discharge. Left eye: No discharge. Extraocular Movements: Extraocular movements intact. Conjunctiva/sclera: Conjunctivae normal.      Pupils: Pupils are equal, round, and reactive to light. Neck:      Vascular: No carotid bruit. Cardiovascular:      Rate and Rhythm: Normal rate and regular rhythm. Pulses: Normal pulses. Heart sounds: Normal heart sounds. No murmur heard. No friction rub. No gallop. Pulmonary:      Effort: Pulmonary effort is normal. No respiratory distress. Breath sounds: Normal breath sounds. No stridor. No wheezing or rhonchi. Abdominal:      General: Abdomen is flat. Bowel sounds are normal. There is no distension. Palpations: Abdomen is soft. Tenderness: There is no abdominal tenderness. Musculoskeletal:         General: No swelling, tenderness, deformity or signs of injury. Normal range of motion. Cervical back: Normal range of motion and neck supple. No rigidity or tenderness. Right lower leg: No edema. Left lower leg: No edema. Lymphadenopathy:      Cervical: No cervical adenopathy. Skin:     General: Skin is warm. Capillary Refill: Capillary refill takes less than 2 seconds. Coloration: Skin is not jaundiced or pale. Findings: No bruising or erythema. Neurological:      General: No focal deficit present. Mental Status: He is alert and oriented to person, place, and time. Cranial Nerves: No cranial nerve deficit. Sensory: No sensory deficit. Motor: No weakness.       Coordination: Coordination normal.      Gait: Gait normal.      Deep Tendon Reflexes: Reflexes normal.   Psychiatric:         Mood and Affect: Mood normal.         Behavior: Behavior normal.         Thought Content: Thought content normal.         Judgment: Judgment normal.         Hospital Course:   Juli Mojica is a 52 y.o.  male  who presents with Acute on chronic respiratory failure with hypoxia ([address]    -Please refer to discharge diagnosis and plan as mentioned above for details on hospital course. The patient expressed appropriate understanding of and agreement with the discharge recommendations, medications, and plan. Consults this admission:  IP CONSULT TO NEPHROLOGY  IP CONSULT TO PODIATRY      Discharge Instruction:   Handoff to PCP:   -  Follow up appointments: PCP, nephro  Primary care physician:    Xu Lara MD       Diet:  renal diet   Activity: activity as tolerated  Disposition: Discharged to:   [x]Home, [][facility], []SNF, []Acute Rehab, []Hospice   Condition on discharge: Stable    Discharge Medications:        Medication List        CONTINUE taking these medications      alogliptin 6.25 MG Tabs tablet  Commonly known as: NESINA  Take 1 tablet by mouth in the morning. amLODIPine 10 MG tablet  Commonly known as: NORVASC  Take 1 tablet by mouth daily     ARIPiprazole 5 MG tablet  Commonly known as: ABILIFY  Take 1 tablet by mouth at bedtime     aspirin 81 MG EC tablet  Take 1 tablet by mouth daily     atorvastatin 40 MG tablet  Commonly known as: LIPITOR  Take 1 tablet by mouth nightly     calcitRIOL 0.25 MCG capsule  Commonly known as: ROCALTROL  Take 1 capsule by mouth in the morning. carvedilol 12.5 MG tablet  Commonly known as: COREG  Take 1 tablet by mouth 2 times daily (with meals)     clopidogrel 75 MG tablet  Commonly known as: PLAVIX  Take 1 tablet by mouth daily     epoetin paola-epbx 00227 UNIT/ML Soln injection  Commonly known as: RETACRIT  Inject 1 mL into the skin three times a week To be given in HD by nurse     glucose monitoring kit  1 each by Does not apply route once for 1 dose.      insulin lispro (1 Unit Dial) 100 UNIT/ML Sopn  Commonly known as: HumaLOG KwikPen  Inject 20 Units into the skin in the morning and 20 Units at noon and 20 Units in the evening. Inject before meals. Lancets Misc  1 each by Does not apply route daily     Lantus SoloStar 100 UNIT/ML injection pen  Generic drug: insulin glargine  Inject 45 Units into the skin nightly     oxyCODONE-acetaminophen 5-325 MG per tablet  Commonly known as: PERCOCET     pantoprazole 40 MG tablet  Commonly known as: PROTONIX  Take 1 tablet by mouth in the morning and 1 tablet in the evening. Take before meals. ranolazine 500 MG extended release tablet  Commonly known as: Ranexa  Take 1 tablet by mouth 2 times daily     sevelamer 800 MG tablet  Commonly known as: RENVELA     traZODone 50 MG tablet  Commonly known as: DESYREL  Take 1 tablet by mouth nightly as needed for Sleep            STOP taking these medications      chlorthalidone 25 MG tablet  Commonly known as: HYGROTON     furosemide 40 MG tablet  Commonly known as: Lasix     insulin aspart 100 UNIT/ML injection vial  Commonly known as: NOVOLOG     linagliptin 5 MG tablet  Commonly known as: TRADJENTA     metoprolol tartrate 25 MG tablet  Commonly known as: LOPRESSOR              Objective Findings at Discharge:       BMP/CBC  Recent Labs     02/07/23  2117 02/08/23  0545 02/09/23  0631 02/09/23  1711 02/10/23  0647    138 137  --  137   K 5.2* 5.4* 5.1  --  5.6*   CL 99 98* 101  --  101   CO2 19* 19* 22  --  22   BUN 92* 96* 64*  --  78*   CREATININE 9.9* 11.2* 8.6*  --  9.1*   WBC 8.0  --   --  8.2  --    HCT 28.3*  --   --  29.7*  --      --   --  216  --        IMAGING:   Show images for XR CHEST PORTABLE   Increased lung markings bilaterally, may be related to mild pulmonary   vascular congestion versus bronchitis. Additional Information: Patient seen and examined day of discharge.  For more information regarding patient's care please contact [facility] records [phone]    Discharge Time of 30 minutes    Electronically signed by Jenn Jimenez MD on 2/10/2023 at 3:47 PM

## 2023-02-10 NOTE — PROGRESS NOTES
Nephrology Progress Note        [address], [address] [address]  Phone: [phone]  Office Hours: 8:30AM - 4:30PM  Monday - Friday        2/10/2023 7:17 AM  Subjective:   Admit Date: 2/7/2023  PCP: Ralph Dang MD  Interval History: On nc  Doing ok  Diet: ADULT DIET; Regular; 4 carb choices (60 gm/meal); Low Sodium (2 gm); Low Potassium (Less than 3000 mg/day); Low Phosphorus (Less than 1000 mg)      Data:   Scheduled Meds:   sodium chloride flush  5-40 mL IntraVENous 2 times per day    heparin (porcine)  5,000 Units SubCUTAneous 3 times per day    insulin lispro  0-8 Units SubCUTAneous TID WC    insulin lispro  0-4 Units SubCUTAneous Nightly    amLODIPine  10 mg Oral Daily    ARIPiprazole  5 mg Oral Nightly    atorvastatin  40 mg Oral Nightly    carvedilol  12.5 mg Oral BID WC    clopidogrel  75 mg Oral Daily    pantoprazole  40 mg Oral BID AC    ranolazine  500 mg Oral BID    sevelamer  800 mg Oral TID WC    calcitRIOL  0.5 mcg Oral BID    insulin glargine  35 Units SubCUTAneous Nightly    insulin lispro  12 Units SubCUTAneous TID WC     Continuous Infusions:   sodium chloride      dextrose       PRN Meds:sodium chloride flush, sodium chloride, ondansetron **OR** ondansetron, polyethylene glycol, acetaminophen **OR** acetaminophen, glucose, dextrose bolus **OR** dextrose bolus, glucagon (rDNA), dextrose, oxyCODONE-acetaminophen, traZODone  I/O last 3 completed shifts: In: 56 [P.O.:120; I.V.:10]  Out: 2421 [Urine:400]  No intake/output data recorded.     Intake/Output Summary (Last 24 hours) at 2/10/2023 0717  Last data filed at 2/9/2023 2017  Gross per 24 hour   Intake 630 ml   Output 2421 ml   Net -1791 ml       CBC:   Recent Labs     02/07/23 2117 02/09/23  1711   WBC 8.0 8.2   HGB 9.1* 9.3*    216       BMP:    Recent Labs     02/07/23 2117 02/08/23  0545 02/09/23  0631    138 137   K 5.2* 5.4* 5.1   CL 99 98* 101   CO2 19* 19* 22   BUN 92* 96* 64* CREATININE 9.9* 11.2* 8.6*   GLUCOSE 208* 126* 225*     Hepatic:   Recent Labs     02/07/23 2117   AST 12*   ALT 8*   BILITOT 0.2   ALKPHOS 68     Troponin: No results for input(s): TROPONINI in the last 72 hours. BNP: No results for input(s): BNP in the last 72 hours. Lipids: No results for input(s): CHOL, HDL in the last 72 hours. Invalid input(s): LDLCALCU  ABGs:   Lab Results   Component Value Date/Time    PO2ART 119 06/18/2013 01:15 PM    JID1SBC 47.0 06/18/2013 01:15 PM     INR: No results for input(s): INR in the last 72 hours.     Objective:   Vitals: /73   Pulse 75   Temp 97.8 °F (36.6 °C) (Axillary)   Resp 18   Ht 5' 9\" (1.753 m)   Wt 287 lb (130.2 kg)   SpO2 98%   BMI 42.38 kg/m²   General appearance: alert and cooperative with exam, in no acute distress  HEENT: normocephalic, atraumatic,   Neck: supple, trachea midline  Lungs:breathing comfortably on nc  Extremities: extremities atraumatic, no cyanosis or edema  Neurologic: alert, oriented, follows commands, interactive    MEDICAL DECISION MAKING     Patient Active Problem List    Diagnosis Date Noted    Fluid overload 02/07/2023    Problem with vascular access 11/26/2022    ESRD (end stage renal disease) ([facility] 09/12/2022    WD - Ulcer of forefoot due to type 2 diabetes mellitus ([facility] 09/01/2022    NSTEMI (non-ST elevated myocardial infarction) ([facility] 08/24/2022    Chest tightness 08/22/2022    CARMEN (acute kidney injury) ([facility] 07/25/2022    Anemia 07/25/2022    Recurrent major depressive disorder, in full remission ([facility] 05/18/2021    Generalized anxiety disorder 05/18/2021    Surgical wound dehiscence 02/08/2022    Chest pain 12/21/2021    Other proteinuria     FH: CAD (coronary artery disease) 09/29/2021    ASCVD (arteriosclerotic cardiovascular disease) 09/29/2021    Chronic kidney disease, stage IV (severe) ([facility] 09/28/2021    Nephrotic syndrome 09/22/2021    Generalized edema 09/22/2021    Stage 3b chronic kidney disease ([facility] 09/22/2021    Diabetic nephropathy associated with type 2 diabetes mellitus ([address] 09/22/2021    Hypoalbuminemia 09/22/2021    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 type 2 diabetes mellitus, with muscle involvement without evidence of necrosis ([address] 09/21/2021    Right groin wound 06/09/2021    Ulcer of right groin with fat layer exposed ([address]     Syncope 06/05/2021    Necrotizing fasciitis ([address] 05/24/2021    Morbid obesity with body mass index (BMI) of 40.0 to 44.9 in adult [facility]) 05/21/2021    Irene gangrene     Cellulitis, scrotum 05/13/2021    Acute epididymitis     Cellulitis of left arm 04/03/2021    Cellulitis 03/23/2021    Acute renal failure (ARF) ([address] 08/04/2019    Diabetic foot infection ([address] 03/21/2019    Intractable nausea and vomiting 01/11/2019    Uncontrolled type 2 diabetes mellitus 01/11/2019    Nausea and vomiting 01/11/2019    Constipation 10/07/2018    Cellulitis of left foot     Sepsis ([address] 32/10/7337    Periumbilical hernia     Abscess 12/03/2017    Osteomyelitis ([address] 05/22/2015    Bronchitis 10/15/2013    Hyperlipidemia with target LDL less than 100 07/15/2013    Essential hypertension 07/15/2013    Bilateral carpal tunnel syndrome- left worse than right 06/24/2013    DANIELA (obstructive sleep apnea) 06/20/2013    Urinary frequency 06/20/2013    Neck pain 05/16/2013    Anxiety associated with depression     Panic attack 02/01/2012    Diabetic neuropathy ([address] 12/22/2011    Hiatal hernia 02/04/2011    Diabetes mellitus type 2, improved controlled 02/04/2011     -HD planned today with 3KG UF if BP tolerates  -Will follow  -Reeducated on need to keep outpt HD schedule and call the center for a catch session if he happens to miss his scheduled session,, to avoid these admissions                  Electronically signed by Hilary Montoya DO on 2/10/2023 at 7:17 AM    [address] MD  [address], [place] [facility] [address] [place] [address]  PHONE: [phone]  FAX: [phone]

## 2023-02-10 NOTE — PROGRESS NOTES
02/10/23 1017   Encounter Summary   Encounter Overview/Reason  Attempted Encounter   Service Provided For: Patient not available   [facility]   Support System Spouse   Last Encounter  02/10/23  (Patient out of room; not available; silent prayer given)   Complexity of Encounter Low   Begin Time 1008   End Time  1018   Total Time Calculated 10 min   Encounter    Type Initial Screen/Assessment   Spiritual/Emotional needs   Type Spiritual Support   Assessment/Intervention/Outcome   Assessment Unable to assess   Plan and Referrals   Plan/Referrals Continue to visit, (comment)

## 2023-02-10 NOTE — PROGRESS NOTES
4 Eyes Skin Assessment     NAME:  Dong Houston OF BIRTH:  1975  MEDICAL RECORD NUMBER:  [id number]    The patient is being assessed for  Transfer to New Unit    I agree that One RN has performed a thorough Head to Toe Skin Assessment on the patient. ALL assessment sites listed below have been assessed. Areas assessed by both nurses:    Head, Face, Ears, Shoulders, Back, Chest, Arms, Elbows, Hands, Sacrum. Buttock, Coccyx, Ischium, and Legs. Feet and Heels        Does the Patient have a Wound? Yes wound(s) were present on assessment.  LDA wound assessment was Initiated and completed by RN       Pradeep Prevention initiated by RN: Yes   Wound Care Orders initiated by RN: Yes    Pressure Injury (Stage 3,4, Unstageable, DTI, NWPT, and Complex wounds) if present, place referral order by RN under : Yes    New and Established Ostomies, if present place, referral order under : Yes      Nurse 1 eSignature: Electronically signed by Maryam Gates RN on 2/9/23 at 11:37 PM EST    **SHARE this note so that the co-signing nurse can place an eSignature**    Nurse 2 eSignature: Electronically signed by Alisia Brice LPN on 6/19/23 at 66:28 AM EST

## 2023-02-10 NOTE — PLAN OF CARE
Problem: Discharge Planning  Goal: Discharge to home or other facility with appropriate resources  2/10/2023 0930 by Manoj Calvo RN  Outcome: Progressing     Problem: Pain  Goal: Verbalizes/displays adequate comfort level or baseline comfort level  2/10/2023 0930 by Manoj Calvo RN  Outcome: Progressing     Problem: Safety - Adult  Goal: Free from fall injury  2/10/2023 0930 by Manoj Calvo RN  Outcome: Progressing     Problem: Chronic Conditions and Co-morbidities  Goal: Patient's chronic conditions and co-morbidity symptoms are monitored and maintained or improved  2/10/2023 0930 by Manoj Calvo RN  Outcome: Progressing

## 2023-02-10 NOTE — PROGRESS NOTES
Doctor Please copy and paste below in your progress note per DME requirment. Patient was seen in hospital for   CAD . I am prescribing oxygen because the diagnosis and testing requires the patient to have oxygen in the home. Conditions will improve or be benefited by oxygen use. The patient is able to perform good mobility and therefore requires the use of a portable oxygen system for ambulation.

## 2023-02-10 NOTE — PROGRESS NOTES
Patient Name: Fatou Savage  Patient : 1975  MRN: [id number]     Acct: [de-identified]  Date of Admission: 2023  Room/Bed: 4126/4126-A  Code Status:  Full Code  Allergies:    Allergies   Allergen Reactions    Adhesive Tape Rash    Doxycycline Nausea And Vomiting    Reglan [Metoclopramide] Anxiety     Diagnosis:    Patient Active Problem List   Diagnosis    Hiatal hernia    Diabetes mellitus type 2, improved controlled    Diabetic neuropathy (HCC)    Panic attack    Anxiety associated with depression    Neck pain    DANIELA (obstructive sleep apnea)    Urinary frequency    Bilateral carpal tunnel syndrome- left worse than right    Hyperlipidemia with target LDL less than 100    Essential hypertension    Bronchitis    Osteomyelitis (HCC)    Abscess    Periumbilical hernia    Sepsis (HCC)    Cellulitis of left foot    Constipation    Intractable nausea and vomiting    Uncontrolled type 2 diabetes mellitus    Nausea and vomiting    Diabetic foot infection (HCC)    Acute renal failure (ARF) (HCC)    Cellulitis    Cellulitis of left arm    Cellulitis, scrotum    Acute epididymitis    Irene gangrene    Recurrent major depressive disorder, in full remission ([address]    Generalized anxiety disorder    Morbid obesity with body mass index (BMI) of 40.0 to 44.9 in [facility])    Necrotizing fasciitis (HCC)    Syncope    Right groin wound    Ulcer of right groin with fat layer exposed ([address]    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 type 2 diabetes mellitus, with muscle involvement without evidence of necrosis (HCC)    Nephrotic syndrome    Generalized edema    Stage 3b chronic kidney disease (HCC)    Diabetic nephropathy associated with type 2 diabetes mellitus (HCC)    Hypoalbuminemia    Chronic kidney disease, stage IV (severe) (HCC)    FH: CAD (coronary artery disease)    ASCVD (arteriosclerotic cardiovascular disease)    Other proteinuria    Chest pain    Surgical wound dehiscence    CARMEN (acute kidney injury) ([facility]    Anemia    Chest tightness    NSTEMI (non-ST elevated myocardial infarction) ([facility])    WD - Ulcer of forefoot due to type 2 diabetes mellitus ([facility]    ESRD (end stage renal disease) ([facility]    Problem with vascular access    Fluid overload    Acute on chronic respiratory failure with hypoxia (HCC)         Treatment:  Hemodilaysis 2:1  Priority: Routine  Location: Acute Room    Diabetic: Yes  NPO: No  Isolation Precautions: Dialysis     Consent for Treatment Verified: Yes  Blood Consent Verified: Not Applicable     Safety Verified: Identify (I), Consent (C), Equipment (E), HepB Status (B), Orders Complete (O), Access Verified (A), and Timeliness (T)  Time out performed prior to access at 0900 hours. Report Received from Primary RN at 0730 hours. Primary RN (First Initial, Last Name, Title): M. Lewie Fabry RN  Incapacitated Nurse Education Completed: Not Applicable     HBsAg ONLY:  Date Drawn: February 3, 2023       Results: Negative  HBsAb:  Date Drawn:  February 10, 2023       Results: Unknown    Order  Dialysis Bath  K+ (Potassium): 2  Ca+ (Calcium):  (3.5)  Na+ (Sodium): 138  HCO3 (Bicarb): 35  Bicarbonate Concentrate Lot No.: [id number]  Acid Concentrate Lot No.: [id number]     Na+ Modeling: Not Applicable  Dialyzer: [id number]  Dialysate Temperature (C):  36  Blood Flow Rate (BFR):  350   Dialysate Flow Rate (DFR):   700        Access to be Utilized   Access:  Tunneled Catheter  Location: Internal Jugular  Side: Right   Needle gauge:  Not Applicable  + Bruit/Thrill: Not Applicable    First Use X-ray Verified: Not Applicable  OK to use line order: Not Applicable    Site Assessment:  Signs and Symptoms of Infection/Inflammation: None  If yes: Not Applicable  Dressing: Dry and Intact  Site Prep: Medical Aseptic Technique  Dressing Changed this Treatment: No  If yes, by whom: NA - not changed today  Date of Last Dressing Change: February 8, 2023  Antimicrobial Patch in place?: Yes  Red Alcohol Caps in place?: Yes  Gauze Dressing?: No  Non Dialysis Use?: No  Comment:    Flows: Good, Patent  If access problem, who was notified:     Pre and Post-Assessment  Patient Vitals for the past 8 hrs:   Level of Consciousness Oriented X Heart Rhythm Respiratory Quality/Effort O2 Device Bilateral Breath Sounds Skin Condition/Temp Abdomen Inspection Bowel Sounds (All Quadrants) Edema Edema Generalized   02/10/23 0900 0 4 Regular Unlabored Nasal cannula Diminished Dry; Warm Obese; Soft Active Generalized Non-pitting   02/10/23 1245 0 4 Regular Unlabored Nasal cannula Diminished Warm Obese; Soft Active Generalized Non-pitting     Labs  Recent Labs     02/07/23  2117 02/09/23  1711   WBC 8.0 8.2   HGB 9.1* 9.3*   HCT 28.3* 29.7*    216                                                                  Recent Labs     02/08/23  0545 02/09/23  0631 02/10/23  0647    137 137   K 5.4* 5.1 5.6*   CL 98* 101 101   CO2 19* 22 22   BUN 96* 64* 78*   CREATININE 11.2* 8.6* 9.1*   GLUCOSE 126* 225* 139*     IV Drips and Rate/Dose   sodium chloride      dextrose        Safety - Before each treatment:   Dialysis Machine No.: [id number]   Machine Number: [id number]  Dialyzer Lot No.: [id number]   Machine Log Sheet Completed: Yes  Machine Alarm Self Test: Completed; Passed (02/10/23 0900)     Air Foam Detector: Tested, Proper Function, pH Reading  Extracorporeal Circuit Tested for Integrity: Yes  Machine Conductivity: 13.6  Manual Conductivity: 13.8     Bicarbonate Concentrate Lot No.: [id number]  Acid Concentrate Lot No.: [id number]  Manual Ph: 7.4  Bleach Test (Neg): Yes  Bath Temperature: 96.8 °F (36 °C)  Tubing Lot#: [id number]  Conductivity Meter Serial #: [id number]  All Connections Secure?: Yes  Venous Parameters Set?: Yes  Arterial Parameters Set?: Yes  Saline Line Double Clamped?: Yes  Air Foam Detector Engaged?: Yes  Machine Functioning Alarm Free?  Yes  Prime Given: 200ml    Chlorine Testing - Before each treatment and every 4 hours: Treatment  Treatment Number: 2  Time On: 2072  Time Off: 5940  Treatment Goal: 2L in 3hr  Weight: 287 lb (130.2 kg) (02/10/23 0600)  1st check: less than 0.1 ppm at: 0655 hours  2nd check: less than 0.1 ppm at: 1030 hours  3rd check: Not Applicable  (if greater than 0.1 ppm, then check every 30 minutes from secondary)    Access Flows and Pressures  Patient Vitals for the past 8 hrs:   Blood Flow Rate (ml/min) Ultrafiltration Rate (ml/hr) Arterial Pressure (mmHg) Venous Pressure (mmHg) TMP DFR Comments Access Visible   02/10/23 0914 400 ml/min 1000 ml/hr -80 mmHg 110 50 700 tx initiated Yes   02/10/23 0930 400 ml/min 1000 ml/hr -80 mmHg 120 50 700 lines secure Yes   02/10/23 0945 400 ml/min 1000 ml/hr -90 mmHg 130 50 700 snoring Yes   02/10/23 1000 400 ml/min 1000 ml/hr -90 mmHg 130 50 700 denies needs Yes   02/10/23 1015 400 ml/min 1000 ml/hr -90 mmHg 130 50 700 no distress Yes   02/10/23 1030 400 ml/min 1000 ml/hr -90 mmHg 130 50 700 denies complaints Yes   02/10/23 1045 400 ml/min 1000 ml/hr -110 mmHg 140 50 700 no complaints Yes   02/10/23 1100 400 ml/min 1000 ml/hr -110 mmHg 140 50 700 resting quietly Yes   02/10/23 1115 400 ml/min 1000 ml/hr -110 mmHg 140 50 700 no changes Yes   02/10/23 1130 400 ml/min 1000 ml/hr -110 mmHg 140 50 700 resting Yes   02/10/23 1145 400 ml/min 1000 ml/hr -110 mmHg 180 50 700 resting Yes   02/10/23 1200 400 ml/min 1000 ml/hr -100 mmHg 200 50 700 snoring Yes   02/10/23 1215 350 ml/min 1000 ml/hr -80 mmHg 200 50 700 resting Yes   02/10/23 1230 350 ml/min 1000 ml/hr -80 mmHg 200 50 700 DENIES NEEDS Yes   02/10/23 1245 -- -- -- -- -- -- tx endeed Yes     Vital Signs  Patient Vitals for the past 8 hrs:   BP Temp Pulse Resp SpO2 Weight Weight Method Percent Weight Change   02/10/23 0600 -- -- -- -- -- 287 lb (130.2 kg) Bed scale 0   02/10/23 0900 126/78 97.7 °F (36.5 °C) 72 18 98 % -- -- --   02/10/23 0914 125/77 -- 76 -- -- -- -- --   02/10/23 0930 138/79 -- 77 -- -- -- -- -- 02/10/23 0945 100/61 -- 75 -- -- -- -- --   02/10/23 1000 133/69 -- 73 -- -- -- -- --   02/10/23 1015 132/78 -- 75 -- -- -- -- --   02/10/23 1030 119/71 -- 74 -- -- -- -- --   02/10/23 1045 123/74 -- 73 -- -- -- -- --   02/10/23 1100 (!) 119/59 -- 74 -- -- -- -- --   02/10/23 1115 (!) 119/56 -- 72 -- -- -- -- --   02/10/23 1130 (!) 97/59 -- 72 -- -- -- -- --   02/10/23 1145 (!) 110/52 -- 73 -- -- -- -- --   02/10/23 1200 125/70 -- 74 -- -- -- -- --   02/10/23 1215 123/75 -- 74 -- -- -- -- --   02/10/23 1230 124/72 -- 72 -- -- -- -- --   02/10/23 1245 121/76 97.7 °F (36.5 °C) 72 18 98 % -- -- --     Post-Dialysis  Arterial Catheter Locking Solution:  NS  Venous Catheter Locking Solution:  NS  Post-Treatment Procedures: Blood returned, Catheter Capped, clamped with Saline x2 ports  Machine Disinfection Process: Exterior Machine Disinfection  Rinseback Volume (ml): 300 ml  Blood Volume Processed (Liters): 37.3 l/min  Dialyzer Clearance: Heavily streaked  Duration of Treatment (minutes): 210 minutes     Hemodialysis Intake (ml): 500 ml  Hemodialysis Output (ml): 3400 ml     Tolerated Treatment: Good  Patient Response to Treatment: well  Physician Notified: No       Provider Notification        Handoff complete and report given to Primary RN at 1309 hours. Primary RN (First Initial, Last Name, Title):  Joe  [facility] RN     Education  Person Educated: Patient   Knowledge Base: Substantial  Barriers to Learning?: None  Preferred method of Learning: Oral  Topic(s): Access Care, Signs and Symptoms of Infection, and Procedural   Teaching Tools: Explanation   Response to Education: Verbalized Understanding     Electronically signed by Yodit Ruth, RN on 2/10/2023 at 1:09 PM

## 2023-02-10 NOTE — PROGRESS NOTES
Patient returning from HD, vitals stable, bedside table and call light in reach.  Nutrition services contacted for patient to order lunch meal.

## 2023-02-22 ENCOUNTER — HOSPITAL ENCOUNTER (INPATIENT)
Age: 48
LOS: 2 days | Discharge: HOME | End: 2023-02-24
Attending: EMERGENCY MEDICINE
Payer: MEDICARE

## 2023-02-22 ENCOUNTER — APPOINTMENT (OUTPATIENT)
Dept: GENERAL RADIOLOGY | Age: 48
End: 2023-02-22
Payer: MEDICARE

## 2023-02-22 DIAGNOSIS — J81.0 ACUTE PULMONARY EDEMA (HCC): ICD-10-CM

## 2023-02-22 DIAGNOSIS — E83.51 HYPOCALCEMIA: ICD-10-CM

## 2023-02-22 DIAGNOSIS — J96.01 ACUTE RESPIRATORY FAILURE WITH HYPOXIA (HCC): Primary | ICD-10-CM

## 2023-02-22 DIAGNOSIS — N18.6 ESRD (END STAGE RENAL DISEASE) (HCC): ICD-10-CM

## 2023-02-22 PROBLEM — E87.70 VOLUME OVERLOAD: Status: ACTIVE | Noted: 2023-02-22

## 2023-02-22 LAB
ALBUMIN SERPL-MCNC: 3.8 GM/DL (ref 3.4–5)
ALP BLD-CCNC: 68 IU/L (ref 40–128)
ALT SERPL-CCNC: 9 U/L (ref 10–40)
ANION GAP SERPL CALCULATED.3IONS-SCNC: 17 MMOL/L (ref 4–16)
AST SERPL-CCNC: 12 IU/L (ref 15–37)
BASE EXCESS: 2 (ref 0–3.3)
BASOPHILS ABSOLUTE: 0 K/CU MM
BASOPHILS RELATIVE PERCENT: 0.4 % (ref 0–1)
BILIRUB SERPL-MCNC: 0.2 MG/DL (ref 0–1)
BUN SERPL-MCNC: 70 MG/DL (ref 6–23)
CALCIUM SERPL-MCNC: 6.7 MG/DL (ref 8.3–10.6)
CHLORIDE BLD-SCNC: 96 MMOL/L (ref 99–110)
CO2: 24 MMOL/L (ref 21–32)
COMMENT: ABNORMAL
CREAT SERPL-MCNC: 8.6 MG/DL (ref 0.9–1.3)
DIFFERENTIAL TYPE: ABNORMAL
EKG ATRIAL RATE: 90 BPM
EKG DIAGNOSIS: NORMAL
EKG P AXIS: -23 DEGREES
EKG P-R INTERVAL: 142 MS
EKG Q-T INTERVAL: 394 MS
EKG QRS DURATION: 84 MS
EKG QTC CALCULATION (BAZETT): 481 MS
EKG R AXIS: 94 DEGREES
EKG T AXIS: 153 DEGREES
EKG VENTRICULAR RATE: 90 BPM
EOSINOPHILS ABSOLUTE: 0.2 K/CU MM
EOSINOPHILS RELATIVE PERCENT: 2.8 % (ref 0–3)
GFR SERPL CREATININE-BSD FRML MDRD: 7 ML/MIN/1.73M2
GLUCOSE BLD-MCNC: 135 MG/DL (ref 70–99)
GLUCOSE BLD-MCNC: 203 MG/DL (ref 70–99)
GLUCOSE SERPL-MCNC: 192 MG/DL (ref 70–99)
HBV SURFACE AB SERPL IA-ACNC: 4.07 M[IU]/ML
HBV SURFACE AG SERPL QL IA: NON REACTIVE
HCO3 VENOUS: 25.6 MMOL/L (ref 19–25)
HCT VFR BLD CALC: 27.7 % (ref 42–52)
HEMOGLOBIN: 8.9 GM/DL (ref 13.5–18)
IMMATURE NEUTROPHIL %: 0.4 % (ref 0–0.43)
LYMPHOCYTES ABSOLUTE: 1.2 K/CU MM
LYMPHOCYTES RELATIVE PERCENT: 16 % (ref 24–44)
MAGNESIUM: 2.1 MG/DL (ref 1.8–2.4)
MCH RBC QN AUTO: 29.6 PG (ref 27–31)
MCHC RBC AUTO-ENTMCNC: 32.1 % (ref 32–36)
MCV RBC AUTO: 92 FL (ref 78–100)
MONOCYTES ABSOLUTE: 0.6 K/CU MM
MONOCYTES RELATIVE PERCENT: 7.8 % (ref 0–4)
NUCLEATED RBC %: 0 %
O2 SAT, VEN: 77.1 % (ref 50–70)
PCO2, VEN: 57 MMHG (ref 38–52)
PDW BLD-RTO: 14 % (ref 11.7–14.9)
PH VENOUS: 7.26 (ref 7.32–7.42)
PLATELET # BLD: 210 K/CU MM (ref 140–440)
PMV BLD AUTO: 9.8 FL (ref 7.5–11.1)
PO2, VEN: 49 MMHG (ref 28–48)
POTASSIUM SERPL-SCNC: 4.4 MMOL/L (ref 3.5–5.1)
PRO-BNP: ABNORMAL PG/ML
RBC # BLD: 3.01 M/CU MM (ref 4.6–6.2)
SEGMENTED NEUTROPHILS ABSOLUTE COUNT: 5.4 K/CU MM
SEGMENTED NEUTROPHILS RELATIVE PERCENT: 72.6 % (ref 36–66)
SODIUM BLD-SCNC: 137 MMOL/L (ref 135–145)
TOTAL IMMATURE NEUTOROPHIL: 0.03 K/CU MM
TOTAL NUCLEATED RBC: 0 K/CU MM
TOTAL PROTEIN: 7.3 GM/DL (ref 6.4–8.2)
WBC # BLD: 7.4 K/CU MM (ref 4–10.5)

## 2023-02-22 PROCEDURE — 96365 THER/PROPH/DIAG IV INF INIT: CPT

## 2023-02-22 PROCEDURE — 93005 ELECTROCARDIOGRAM TRACING: CPT | Performed by: EMERGENCY MEDICINE

## 2023-02-22 PROCEDURE — 82962 GLUCOSE BLOOD TEST: CPT

## 2023-02-22 PROCEDURE — 71045 X-RAY EXAM CHEST 1 VIEW: CPT

## 2023-02-22 PROCEDURE — 5A1D70Z PERFORMANCE OF URINARY FILTRATION, INTERMITTENT, LESS THAN 6 HOURS PER DAY: ICD-10-PCS | Performed by: INTERNAL MEDICINE

## 2023-02-22 PROCEDURE — 6370000000 HC RX 637 (ALT 250 FOR IP): Performed by: INTERNAL MEDICINE

## 2023-02-22 PROCEDURE — 2700000000 HC OXYGEN THERAPY PER DAY

## 2023-02-22 PROCEDURE — 94660 CPAP INITIATION&MGMT: CPT

## 2023-02-22 PROCEDURE — 83880 ASSAY OF NATRIURETIC PEPTIDE: CPT

## 2023-02-22 PROCEDURE — 2580000003 HC RX 258: Performed by: STUDENT IN AN ORGANIZED HEALTH CARE EDUCATION/TRAINING PROGRAM

## 2023-02-22 PROCEDURE — 85025 COMPLETE CBC W/AUTO DIFF WBC: CPT

## 2023-02-22 PROCEDURE — 93010 ELECTROCARDIOGRAM REPORT: CPT | Performed by: INTERNAL MEDICINE

## 2023-02-22 PROCEDURE — 86706 HEP B SURFACE ANTIBODY: CPT

## 2023-02-22 PROCEDURE — 6360000002 HC RX W HCPCS: Performed by: EMERGENCY MEDICINE

## 2023-02-22 PROCEDURE — 83735 ASSAY OF MAGNESIUM: CPT

## 2023-02-22 PROCEDURE — 2140000000 HC CCU INTERMEDIATE R&B

## 2023-02-22 PROCEDURE — 6360000002 HC RX W HCPCS: Performed by: STUDENT IN AN ORGANIZED HEALTH CARE EDUCATION/TRAINING PROGRAM

## 2023-02-22 PROCEDURE — 99285 EMERGENCY DEPT VISIT HI MDM: CPT

## 2023-02-22 PROCEDURE — 90935 HEMODIALYSIS ONE EVALUATION: CPT

## 2023-02-22 PROCEDURE — 2500000003 HC RX 250 WO HCPCS: Performed by: EMERGENCY MEDICINE

## 2023-02-22 PROCEDURE — 6370000000 HC RX 637 (ALT 250 FOR IP): Performed by: STUDENT IN AN ORGANIZED HEALTH CARE EDUCATION/TRAINING PROGRAM

## 2023-02-22 PROCEDURE — 36415 COLL VENOUS BLD VENIPUNCTURE: CPT

## 2023-02-22 PROCEDURE — 87340 HEPATITIS B SURFACE AG IA: CPT

## 2023-02-22 PROCEDURE — 82805 BLOOD GASES W/O2 SATURATION: CPT

## 2023-02-22 PROCEDURE — 80053 COMPREHEN METABOLIC PANEL: CPT

## 2023-02-22 RX ORDER — OXYCODONE AND ACETAMINOPHEN 7.5; 325 MG/1; MG/1
1 TABLET ORAL EVERY 8 HOURS PRN
Status: DISCONTINUED | OUTPATIENT
Start: 2023-02-22 | End: 2023-02-24 | Stop reason: HOSPADM

## 2023-02-22 RX ORDER — CLOPIDOGREL BISULFATE 75 MG/1
75 TABLET ORAL DAILY
Status: DISCONTINUED | OUTPATIENT
Start: 2023-02-22 | End: 2023-02-24 | Stop reason: HOSPADM

## 2023-02-22 RX ORDER — POLYETHYLENE GLYCOL 3350 17 G/17G
17 POWDER, FOR SOLUTION ORAL DAILY PRN
Status: DISCONTINUED | OUTPATIENT
Start: 2023-02-22 | End: 2023-02-24 | Stop reason: HOSPADM

## 2023-02-22 RX ORDER — CARVEDILOL 25 MG/1
25 TABLET ORAL 2 TIMES DAILY WITH MEALS
Status: ON HOLD | COMMUNITY

## 2023-02-22 RX ORDER — ACETAMINOPHEN 650 MG/1
650 SUPPOSITORY RECTAL EVERY 6 HOURS PRN
Status: DISCONTINUED | OUTPATIENT
Start: 2023-02-22 | End: 2023-02-24 | Stop reason: HOSPADM

## 2023-02-22 RX ORDER — AMLODIPINE BESYLATE 5 MG/1
10 TABLET ORAL DAILY
Status: CANCELLED | OUTPATIENT
Start: 2023-02-22

## 2023-02-22 RX ORDER — LISINOPRIL 2.5 MG/1
2.5 TABLET ORAL DAILY
Status: ON HOLD | COMMUNITY
End: 2023-08-06 | Stop reason: HOSPADM

## 2023-02-22 RX ORDER — CLOPIDOGREL BISULFATE 75 MG/1
75 TABLET ORAL DAILY
Status: CANCELLED | OUTPATIENT
Start: 2023-02-22

## 2023-02-22 RX ORDER — ASPIRIN 81 MG/1
81 TABLET ORAL DAILY
Status: CANCELLED | OUTPATIENT
Start: 2023-02-22

## 2023-02-22 RX ORDER — ATORVASTATIN CALCIUM 40 MG/1
40 TABLET, FILM COATED ORAL NIGHTLY
Status: CANCELLED | OUTPATIENT
Start: 2023-02-22

## 2023-02-22 RX ORDER — SODIUM CHLORIDE 9 MG/ML
INJECTION, SOLUTION INTRAVENOUS PRN
Status: DISCONTINUED | OUTPATIENT
Start: 2023-02-22 | End: 2023-02-24 | Stop reason: HOSPADM

## 2023-02-22 RX ORDER — TRAZODONE HYDROCHLORIDE 50 MG/1
50 TABLET ORAL NIGHTLY PRN
Status: CANCELLED | OUTPATIENT
Start: 2023-02-22

## 2023-02-22 RX ORDER — LISINOPRIL 5 MG/1
2.5 TABLET ORAL DAILY
Status: DISCONTINUED | OUTPATIENT
Start: 2023-02-22 | End: 2023-02-24 | Stop reason: HOSPADM

## 2023-02-22 RX ORDER — ONDANSETRON 4 MG/1
4 TABLET, ORALLY DISINTEGRATING ORAL EVERY 8 HOURS PRN
Status: DISCONTINUED | OUTPATIENT
Start: 2023-02-22 | End: 2023-02-24 | Stop reason: HOSPADM

## 2023-02-22 RX ORDER — SODIUM CHLORIDE 0.9 % (FLUSH) 0.9 %
5-40 SYRINGE (ML) INJECTION EVERY 12 HOURS SCHEDULED
Status: DISCONTINUED | OUTPATIENT
Start: 2023-02-22 | End: 2023-02-24 | Stop reason: HOSPADM

## 2023-02-22 RX ORDER — INSULIN GLARGINE 100 [IU]/ML
25 INJECTION, SOLUTION SUBCUTANEOUS NIGHTLY
Status: DISCONTINUED | OUTPATIENT
Start: 2023-02-22 | End: 2023-02-22

## 2023-02-22 RX ORDER — DEXTROSE MONOHYDRATE 100 MG/ML
INJECTION, SOLUTION INTRAVENOUS CONTINUOUS PRN
Status: DISCONTINUED | OUTPATIENT
Start: 2023-02-22 | End: 2023-02-24 | Stop reason: HOSPADM

## 2023-02-22 RX ORDER — INSULIN LISPRO 100 [IU]/ML
10 INJECTION, SOLUTION INTRAVENOUS; SUBCUTANEOUS
Status: DISCONTINUED | OUTPATIENT
Start: 2023-02-22 | End: 2023-02-24 | Stop reason: HOSPADM

## 2023-02-22 RX ORDER — CARVEDILOL 6.25 MG/1
12.5 TABLET ORAL 2 TIMES DAILY WITH MEALS
Status: CANCELLED | OUTPATIENT
Start: 2023-02-22

## 2023-02-22 RX ORDER — FUROSEMIDE 10 MG/ML
40 INJECTION INTRAMUSCULAR; INTRAVENOUS ONCE
Status: COMPLETED | OUTPATIENT
Start: 2023-02-22 | End: 2023-02-22

## 2023-02-22 RX ORDER — CALCIUM CARBONATE 500 MG/1
2 TABLET, CHEWABLE ORAL
Status: ON HOLD | COMMUNITY

## 2023-02-22 RX ORDER — INSULIN LISPRO 100 [IU]/ML
0-4 INJECTION, SOLUTION INTRAVENOUS; SUBCUTANEOUS
Status: DISCONTINUED | OUTPATIENT
Start: 2023-02-22 | End: 2023-02-24 | Stop reason: HOSPADM

## 2023-02-22 RX ORDER — CARVEDILOL 25 MG/1
25 TABLET ORAL 2 TIMES DAILY WITH MEALS
Status: DISCONTINUED | OUTPATIENT
Start: 2023-02-22 | End: 2023-02-24 | Stop reason: HOSPADM

## 2023-02-22 RX ORDER — SEVELAMER CARBONATE 800 MG/1
800 TABLET, FILM COATED ORAL
Status: CANCELLED | OUTPATIENT
Start: 2023-02-22

## 2023-02-22 RX ORDER — ONDANSETRON 2 MG/ML
4 INJECTION INTRAMUSCULAR; INTRAVENOUS EVERY 6 HOURS PRN
Status: DISCONTINUED | OUTPATIENT
Start: 2023-02-22 | End: 2023-02-24 | Stop reason: HOSPADM

## 2023-02-22 RX ORDER — ARIPIPRAZOLE 5 MG/1
5 TABLET ORAL NIGHTLY
Status: CANCELLED | OUTPATIENT
Start: 2023-02-22

## 2023-02-22 RX ORDER — CALCITRIOL 0.25 UG/1
0.5 CAPSULE, LIQUID FILLED ORAL 2 TIMES DAILY
Status: DISCONTINUED | OUTPATIENT
Start: 2023-02-22 | End: 2023-02-24 | Stop reason: HOSPADM

## 2023-02-22 RX ORDER — CALCIUM GLUCONATE 20 MG/ML
1000 INJECTION, SOLUTION INTRAVENOUS ONCE
Status: COMPLETED | OUTPATIENT
Start: 2023-02-22 | End: 2023-02-22

## 2023-02-22 RX ORDER — ARIPIPRAZOLE 5 MG/1
5 TABLET ORAL NIGHTLY
Status: DISCONTINUED | OUTPATIENT
Start: 2023-02-22 | End: 2023-02-24 | Stop reason: HOSPADM

## 2023-02-22 RX ORDER — HEPARIN SODIUM 1000 [USP'U]/ML
3400 INJECTION, SOLUTION INTRAVENOUS; SUBCUTANEOUS
Status: DISCONTINUED | OUTPATIENT
Start: 2023-02-22 | End: 2023-02-23

## 2023-02-22 RX ORDER — INSULIN LISPRO 100 [IU]/ML
0-4 INJECTION, SOLUTION INTRAVENOUS; SUBCUTANEOUS NIGHTLY
Status: DISCONTINUED | OUTPATIENT
Start: 2023-02-22 | End: 2023-02-24 | Stop reason: HOSPADM

## 2023-02-22 RX ORDER — INSULIN GLARGINE 100 [IU]/ML
25 INJECTION, SOLUTION SUBCUTANEOUS NIGHTLY
Status: DISCONTINUED | OUTPATIENT
Start: 2023-02-22 | End: 2023-02-23

## 2023-02-22 RX ORDER — ACETAMINOPHEN 325 MG/1
650 TABLET ORAL EVERY 6 HOURS PRN
Status: DISCONTINUED | OUTPATIENT
Start: 2023-02-22 | End: 2023-02-24 | Stop reason: HOSPADM

## 2023-02-22 RX ORDER — ASPIRIN 81 MG/1
81 TABLET ORAL DAILY
Status: DISCONTINUED | OUTPATIENT
Start: 2023-02-22 | End: 2023-02-24 | Stop reason: HOSPADM

## 2023-02-22 RX ORDER — HEPARIN SODIUM 5000 [USP'U]/ML
5000 INJECTION, SOLUTION INTRAVENOUS; SUBCUTANEOUS EVERY 8 HOURS SCHEDULED
Status: DISCONTINUED | OUTPATIENT
Start: 2023-02-22 | End: 2023-02-24 | Stop reason: HOSPADM

## 2023-02-22 RX ADMIN — INSULIN GLARGINE 25 UNITS: 100 INJECTION, SOLUTION SUBCUTANEOUS at 20:42

## 2023-02-22 RX ADMIN — ARIPIPRAZOLE 5 MG: 5 TABLET ORAL at 20:37

## 2023-02-22 RX ADMIN — INSULIN LISPRO 10 UNITS: 100 INJECTION, SOLUTION INTRAVENOUS; SUBCUTANEOUS at 18:52

## 2023-02-22 RX ADMIN — CALCITRIOL 0.5 MCG: 0.5 CAPSULE, LIQUID FILLED ORAL at 10:02

## 2023-02-22 RX ADMIN — SODIUM CHLORIDE, PRESERVATIVE FREE 10 ML: 5 INJECTION INTRAVENOUS at 20:37

## 2023-02-22 RX ADMIN — CALCITRIOL 0.5 MCG: 0.5 CAPSULE, LIQUID FILLED ORAL at 20:37

## 2023-02-22 RX ADMIN — HEPARIN SODIUM 5000 UNITS: 5000 INJECTION INTRAVENOUS; SUBCUTANEOUS at 20:41

## 2023-02-22 RX ADMIN — ASPIRIN 81 MG: 81 TABLET, COATED ORAL at 18:20

## 2023-02-22 RX ADMIN — CALCIUM GLUCONATE 1000 MG: 20 INJECTION, SOLUTION INTRAVENOUS at 09:15

## 2023-02-22 RX ADMIN — LISINOPRIL 2.5 MG: 5 TABLET ORAL at 18:20

## 2023-02-22 RX ADMIN — CALCITRIOL 0.5 MCG: 0.5 CAPSULE, LIQUID FILLED ORAL at 10:04

## 2023-02-22 RX ADMIN — FUROSEMIDE 40 MG: 10 INJECTION, SOLUTION INTRAVENOUS at 10:15

## 2023-02-22 RX ADMIN — CLOPIDOGREL BISULFATE 75 MG: 75 TABLET ORAL at 18:20

## 2023-02-22 RX ADMIN — CARVEDILOL 25 MG: 25 TABLET, FILM COATED ORAL at 18:20

## 2023-02-22 RX ADMIN — OXYCODONE AND ACETAMINOPHEN 1 TABLET: 7.5; 325 TABLET ORAL at 18:20

## 2023-02-22 ASSESSMENT — PAIN DESCRIPTION - LOCATION: LOCATION: FOOT;BACK

## 2023-02-22 ASSESSMENT — PAIN SCALES - GENERAL: PAINLEVEL_OUTOF10: 8

## 2023-02-22 ASSESSMENT — ENCOUNTER SYMPTOMS
SHORTNESS OF BREATH: 1
EYES NEGATIVE: 1
ABDOMINAL PAIN: 1

## 2023-02-22 ASSESSMENT — PAIN DESCRIPTION - ORIENTATION: ORIENTATION: LEFT

## 2023-02-22 ASSESSMENT — PAIN DESCRIPTION - DESCRIPTORS: DESCRIPTORS: THROBBING

## 2023-02-22 NOTE — CONSULTS
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  ESRD on HD MWF  Acute on chronic CHF  Anemia of ESRD    Suggest:  HD today  Resume calcitriol 0.5mcg bid for the hypocalcemia  More fluid removal tomorrow too  Limit oral fluids to 1500ml per day    Thank you          Patient Active Problem List    Diagnosis Date Noted    Volume overload 02/22/2023    Acute on chronic respiratory failure with hypoxia ([address] 02/10/2023    CAD (coronary artery disease) 02/10/2023    Fluid overload 02/07/2023    Problem with vascular access 11/26/2022    ESRD (end stage renal disease) ([address] 09/12/2022    WD - Ulcer of forefoot due to type 2 diabetes mellitus ([address] 09/01/2022    NSTEMI (non-ST elevated myocardial infarction) ([address] 08/24/2022    Chest tightness 08/22/2022    CARMEN (acute kidney injury) ([address] 07/25/2022    Anemia 07/25/2022    Recurrent major depressive disorder, in full remission ([address] 05/18/2021    Generalized anxiety disorder 05/18/2021    Surgical wound dehiscence 02/08/2022    Chest pain 12/21/2021    Other proteinuria     FH: CAD (coronary artery disease) 09/29/2021    ASCVD (arteriosclerotic cardiovascular disease) 09/29/2021    Chronic kidney disease, stage IV (severe) ([address] 09/28/2021    Nephrotic syndrome 09/22/2021    Generalized edema 09/22/2021    Stage 3b chronic kidney disease ([address] 09/22/2021    Diabetic nephropathy associated with type 2 diabetes mellitus ([address] 09/22/2021    Hypoalbuminemia 09/22/2021    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 type 2 diabetes mellitus, with muscle involvement without evidence of necrosis ([address] 09/21/2021    Right groin wound 06/09/2021    Ulcer of right groin with fat layer exposed ([address]     Syncope 06/05/2021    Necrotizing fasciitis ([address] 05/24/2021    Morbid obesity with body mass index (BMI) of 40.0 to 44.9 in adult [facility]) 05/21/2021    Irene gangrene     Cellulitis, scrotum 05/13/2021    Acute epididymitis     Cellulitis of left arm 04/03/2021    Cellulitis 03/23/2021    Acute renal failure (ARF) ([address] 08/04/2019    Diabetic foot infection ([address] 03/21/2019    Intractable nausea and vomiting 01/11/2019    Uncontrolled type 2 diabetes mellitus 01/11/2019    Nausea and vomiting 01/11/2019    Constipation 10/07/2018    Cellulitis of left foot     Sepsis ([address] 16/97/5544    Periumbilical hernia     Abscess 12/03/2017    Osteomyelitis ([address] 05/22/2015    Bronchitis 10/15/2013    Hyperlipidemia with target LDL less than 100 07/15/2013    Essential hypertension 07/15/2013    Bilateral carpal tunnel syndrome- left worse than right 06/24/2013    DANIELA (obstructive sleep apnea) 06/20/2013    Urinary frequency 06/20/2013    Neck pain 05/16/2013    Anxiety associated with depression     Panic attack 02/01/2012    Diabetic neuropathy ([address] 12/22/2011    Hiatal hernia 02/04/2011    Diabetes mellitus type 2, improved controlled 02/04/2011         Patient:  Zeus Beckwith  MRN: [id number]  Consulting physician:  Noni Gonzalez*  Reason for Consult: DYspnea  PCP: Som Feliz MD    HISTORY OF PRESENT ILLNESS:   The patient is a 52 y.o. male with CAD, CHF, ESRD on HD presented from HD center due to dyspnea  He was found to be in pulm edema  renal consult for pulm edema  He has not missed HD since his recent discharge  He Is on NC    REVIEW OF SYSTEMS:  14 point ROS is Negative.  See positive ROS per HPI    Past Medical History:        Diagnosis Date    Abscess 2010    scrotal    Acute renal failure (ARF) ([address] 08/04/2019    Anxiety associated with depression     Anxiety associated with depression     Back pain 07/02/2012    CAD (coronary artery disease) 2/10/2023    Chest pain 05/01/2013    Diabetic nephropathy [facility])     Diabetic neuropathy ([address] 12/22/2011    Diabetic ulcer of left midfoot associated with type 2 diabetes mellitus, with fat layer exposed ([address] 07/18/2017    Diverticulosis     C scope + Dr. Steve Hancock    DM (diabetes mellitus), type 2 ([address] 2002    DR. 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 Steve Hancock    Necrotizing fasciitis [facility])     Nose fracture 1988    Panic attacks     Pericarditis 2003    Hospitalized with s/p heart cath normal    Peripheral autonomic neuropathy due to diabetes mellitus ([address]     Axonal EMG- [facility], March 2011    Sebaceous cyst 09/01/2011    URI (upper respiratory infection) 02/27/2012    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 type 2 diabetes mellitus, with muscle involvement without evidence of necrosis ([address] 9/21/2021    WD-Wound, surgical, infected, initial encounter 12/08/2017    Wrist fracture 1986       Past Surgical History:        Procedure Laterality Date    ABDOMEN SURGERY      CARDIAC CATHETERIZATION  2003, 2013    Normal (Dr. Olena Trejo)    COLONOSCOPY  06/02/2011    Pandiverticulosis, Nonbleeding internal hemorrhoids, Repeat colonoscopy at age 48- Dr. Larsen Book      x 3    FOOT SURGERY Left 12/21/2021    EXCISION OF HEAD LEFT 2ND METATARSAL performed by Denise Corbett DPM at [facility]    IR NONTUNNELED VASCULAR CATHETER  07/25/2022    IR NONTUNNEL[facility] VASCULAR CATHETER [facility]    IR TUNNELED CATHETER PLACEMENT GREATER THAN 5 YEARS  07/27/2022    IR TUNNELED CATHETER PLACEMENT GREATER THAN 5 YEARS [facility]/2022 [facility]    IR TUNNELED CATHETER PLACEMENT GREATER THAN 5 YEARS  11/28/2022    IR TUNNELED CATHETER PLACEMENT GREATER THAN 5 YEARS 11/28/2022 [address] [place]    NOSE SURGERY  1993 \"had reconstruction surgery on nose a year after the other nose surgery\"    OTHER SURGICAL HISTORY Right 05/22/2015    I & D right great toe with partial amputation    OTHER SURGICAL HISTORY  12/04/2017    inc and drainage of abcess    AR INCISION BONE CORTEX FOOT Left 09/22/2018    LEFT FOOT DEBRIDEMENT INCISION AND DRAINAGE TOP AND BOTTOM performed by Denis Gonzales DPM at [facility] N/A 05/15/2021    SCROTAL AND PERINEAL DEBRIDEMENT performed by Carlee Shannon MD at [facility] 05/16/2021    SCROTAL RE-DEBRIDEMENT  INCISION AND DRAINAGE performed by Carlee Shannon MD at [address] N/A 06/18/2013    sebaceous cyst removal times 4     TOE AMPUTATION      right great toe    TOE AMPUTATION Right 03/23/2019    TOE AMPUTATION RIGHT 5TH TOE performed by Denis Gonzales DPM at [address] Right 08/06/2019    TOE AMPUTATION RIGHT 4TH TOE performed by Denis Gonzales DPM at [address] [facility] ENDOSCOPY  06/02/2011    Mild gastritis with moderatley severe antritis, small hiatal hernia, Dr. Corinne Schwartz N/A 01/12/2019    EGD BIOPSY performed by Ash Hernandez MD at [address] [facility] N/A 07/26/2022    EGD DIAGNOSTIC ONLY performed by Ash Hernandez MD at [facility] ENDOSCOPY       Medications:   Prior to Admission medications    Medication Sig Start Date End Date Taking?  Authorizing Provider   carvedilol (COREG) 25 MG tablet Take 25 mg by mouth 2 times daily (with meals)   Yes Historical Provider, MD   calcium carbonate (TUMS) 500 MG chewable tablet Take 2 tablets by mouth 3 times daily (with meals)   Yes Historical Provider, MD   lisinopril (PRINIVIL;ZESTRIL) 2.5 MG tablet Take 2.5 mg by mouth daily   Yes Historical Provider, MD   epoetin paola-epbx (RETACRIT) 35293 UNIT/ML SOLN injection Inject 1 mL into the skin three times a week To be given in HD by nurse 9/16/22   Carlos Dangelo MD   aspirin 81 MG EC tablet Take 1 tablet by mouth daily 8/25/22   Abbe Ruffin MD   clopidogrel (PLAVIX) 75 MG tablet Take 1 tablet by mouth daily 8/25/22   Abbe Ruffin MD   metoprolol tartrate (LOPRESSOR) 25 MG tablet Take 0.5 tablets by mouth 2 times daily 8/25/22 9/14/22  Abbe Ruffin MD   oxyCODONE-acetaminophen (PERCOCET) 7.5-325 MG per tablet Take 1 tablet by mouth every 8 hours as needed. Historical Provider, MD   insulin lispro, 1 Unit Dial, (HUMALOG KWIKPEN) 100 UNIT/ML SOPN Inject 20 Units into the skin in the morning and 20 Units at noon and 20 Units in the evening. Inject before meals. Patient taking differently: Inject 35 Units into the skin 3 times daily (before meals) 8/3/22   Joi Díaz MD   insulin glargine (LANTUS SOLOSTAR) 100 UNIT/ML injection pen Inject 45 Units into the skin nightly  Patient taking differently: Inject 75 Units into the skin nightly 8/3/22   Joi Díaz MD   ARIPiprazole (ABILIFY) 5 MG tablet Take 1 tablet by mouth at bedtime 8/3/22   Joi Díaz MD   furosemide (LASIX) 40 MG tablet Take 1 tablet by mouth daily 9/22/21 8/3/22  Sharri Lam MD   insulin aspart (NOVOLOG) 100 UNIT/ML injection vial Inject 35 Units into the skin 3 times daily (before meals)  8/3/22  Historical Provider, MD   chlorthalidone (HYGROTON) 25 MG tablet Take 1 tablet by mouth daily 6/15/21 8/3/22  Mony Givens MD   linagliptin (TRADJENTA) 5 MG tablet Take 1 tablet by mouth daily 3/29/21 8/3/22  Mony Givens MD   Lancets MISC 1 each by Does not apply route daily 9/21/18   Cornel Patrick MD   glucose monitoring kit (FREESTYLE) monitoring kit 1 each by Does not apply route once for 1 dose. 6/20/13 6/20/13  Simon Brown MD        Allergies:  Adhesive tape, Doxycycline, and Reglan [metoclopramide]    Social History:   TOBACCO:   reports that he quit smoking about 7 months ago. His smoking use included cigarettes.  He has never used smokeless tobacco.  ETOH:   reports that he does not currently use alcohol. OCCUPATION:      Family History:       Problem Relation Age of Onset    Cancer Mother         ?site    Stroke Mother     Bleeding Prob Mother     Diabetes Father     Heart Disease Father     High Blood Pressure Father     Obesity Father     Kidney Disease Father     Diabetes Sister     High Blood Pressure Sister     Mental Illness Sister     Obesity Sister     High Blood Pressure Other     Mental Illness Other         bipolar    Other Son         cyst in ear canal    ADHD Daughter      Physical Exam:    Vitals: BP (!) 145/83   Pulse 85   Temp 98.1 °F (36.7 °C)   Resp 13   SpO2 99%   General appearance: in no acute distress, appears stated age  Skin: Skin color, texture, turgor normal. No rashes or lesions  HEENT: normocephalic, atraumatic  Neck: supple, trachea midline  Lungs: breathing comfortably on nc  Heart[de-identified] regular rate and rhythm, S1, S2 normal,  Abdomen: soft, non-tender; bowel sounds normal; no masses,   Extremities: extremities normal, atraumatic, no cyanosis or edema  Neurologic: Mental status: alert, oriented, interactive, following commands  Psychiatric: mood and affect appropriate     CBC:   Recent Labs     02/22/23  0825   WBC 7.4   HGB 8.9*        BMP:    Recent Labs     02/22/23  0825      K 4.4   CL 96*   CO2 24   BUN 70*   CREATININE 8.6*   GLUCOSE 192*     Hepatic:   Recent Labs     02/22/23  0825   AST 12*   ALT 9*   BILITOT 0.2   ALKPHOS 68     Troponin: No results for input(s): TROPONINI in the last 72 hours. BNP: No results for input(s): BNP in the last 72 hours. No intake/output data recorded.          Electronically signed by Amy Lombardi DO on 2/22/2023 at 4:28 PM    MD Jaimee Pérez DO [address], [place] [address]  PHONE: [phone]  FAX: [phone]

## 2023-02-22 NOTE — ED PROVIDER NOTES
Triage Chief Complaint:    Shortness of Breath    HPI   Mckenna Morrison is a 52 y.o. male that presents for evaluation of progressive shortness of breath for the last few days. Patient states that he has been having difficulty laying down as well. He states that he does feel as though he requires BiPAP right now. He has a CPAP previously which has helped him. He has denied any chest pain right now. No abdominal pain nausea or vomiting. He is scheduled for dialysis today but felt as though he would not be able to make it there. He has not noticed any increased leg swelling. Denies any bloody or bilious emesis. No change to bowel movements. He has a right chest wall catheter in place and left upper extremity fistula. He has not noticed any irregularities with either site. Follows with Dr. Clyde Sousa for his dialysis    History from : Patient and EMS    Limitations to history : None    ROS:  10 systems reviewed and negative except as above. Past Medical History:   Diagnosis Date    Abscess 2010    scrotal    Acute renal failure (ARF) ([address] 08/04/2019    Anxiety associated with depression     Anxiety associated with depression     Back pain 07/02/2012    CAD (coronary artery disease) 2/10/2023    Chest pain 05/01/2013    Diabetic nephropathy ([address]     Diabetic neuropathy ([address] 12/22/2011    Diabetic ulcer of left midfoot associated with type 2 diabetes mellitus, with fat layer exposed ([address] 07/18/2017    Diverticulosis     C scope + Dr. Steve Hancock    DM (diabetes mellitus), type 2 ([address] 2002    DR. 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 Steve Hancock    Necrotizing fasciitis [facility])     Nose fracture 1988    Panic attacks     Pericarditis 2003    Hospitalized with s/p heart cath normal    Peripheral autonomic neuropathy due to diabetes mellitus ([facility]     Axonal EMG- NCS, March 2011    Sebaceous cyst 09/01/2011    URI (upper respiratory infection) 02/27/2012    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 type 2 diabetes mellitus, with muscle involvement without evidence of necrosis ([facility] 9/21/2021    WD-Wound, surgical, infected, initial encounter 12/08/2017    Wrist fracture 1986     Past Surgical History:   Procedure Laterality Date    [address]  2003, 2013    Normal (Dr. Rolando Ag)    COLONOSCOPY  06/02/2011    Pandiverticulosis, Nonbleeding internal hemorrhoids, Repeat colonoscopy at age 48- Dr. Nadeem Corbett      x 3    FOOT SURGERY Left 12/21/2021    EXCISION OF HEAD LEFT 2ND METATARSAL performed by Ne Linn DPM at [address] NONTUNNELED VASCULAR CATHETER  07/25/2022    IR NONTUNNELED VASCULAR CATHETER 7/25/2022 [facility] SPECIAL PROCEDURES    IR TUNNELED CATHETER PLACEMENT GREATER THAN 5 YEARS  07/27/2022    IR TUNNELED CATHETER PLACEMENT GREATER THAN 5 YEARS 7/27/2022 [facility] SPECIAL PROCEDURES    IR TUNNELED CATHETER PLACEMENT GREATER THAN 5 YEARS  11/28/2022    IR TUNNELED CATHETER PLACEMENT GREATER THAN 5 YEARS 11/28/2022 [facility] SPECIAL PROCEDURES    NOSE SURGERY  1993    \"had reconstruction surgery on nose a year after the other nose surgery\"    OTHER SURGICAL HISTORY Right 05/22/2015    I & D right great toe with partial amputation    OTHER SURGICAL HISTORY  12/04/2017    inc and drainage of abcess    IN INCISION BONE CORTEX FOOT Left 09/22/2018    LEFT FOOT DEBRIDEMENT INCISION AND DRAINAGE TOP AND BOTTOM performed by Radha Rhoades DPM at [place] [address] 05/15/2021    SCROTAL AND PERINEAL DEBRIDEMENT performed by Tory Oreilly MD at [place] [address] 05/16/2021    SCROTAL RE-DEBRIDEMENT  INCISION AND DRAINAGE performed by Tory Oreilly MD at [address] N/A 06/18/2013    sebaceous cyst removal times 4     TOE AMPUTATION      right great toe    TOE AMPUTATION Right 2019    TOE AMPUTATION RIGHT 5TH TOE performed by Eyal Mckeon DPM at [address] Right 2019    TOE AMPUTATION RIGHT 4TH TOE performed by Eyal Mckeon DPM at [address] ENDOSCOPY  2011    Mild gastritis with moderatley severe antritis, small hiatal hernia, Dr. Ulises Keenan 2019    EGD BIOPSY performed by Yash Flores MD at [facility] N/A 2022    EGD DIAGNOSTIC ONLY performed by Yash Flores MD at [address] [facility] ENDOSCOPY     Family History   Problem Relation Age of Onset    Cancer Mother         ?site    Stroke Mother     Bleeding Prob Mother     Diabetes Father     Heart Disease Father     High Blood Pressure Father     Obesity Father     Kidney Disease Father     Diabetes Sister     High Blood Pressure Sister     Mental Illness Sister     Obesity Sister     High Blood Pressure Other     Mental Illness Other         bipolar    Other Son         cyst in ear canal    ADHD Daughter      Social History     Socioeconomic History    Marital status:      Spouse name: Not on file    Number of children: Not on file    Years of education: Not on file    Highest education level: Not on file   Occupational History    Not on file   Tobacco Use    Smoking status: Former     Years: 20.00     Types: Cigarettes     Quit date: 2022     Years since quittin.6    Smokeless tobacco: Never    Tobacco comments:     Smokes when drinking/social   Vaping Use    Vaping Use: Never used   Substance and Sexual Activity    Alcohol use: Not Currently     Comment: occ    Drug use: Yes     Frequency: 7.0 times per week     Types: Marijuana Estuardo Smiley)     Comment: 21 @     Sexual activity: Yes     Partners: Female   Other Topics Concern    Not on file   Social History Narrative    Not on file     Social Determinants of Health     Financial Resource Strain: Not on file   Food Insecurity: Not on file   Transportation Needs: Not on file   Physical Activity: Not on file   Stress: Not on file   Social Connections: Not on file   Intimate Partner Violence: Not on file   Housing Stability: Not on file     Current Facility-Administered Medications   Medication Dose Route Frequency Provider Last Rate Last Admin    calcium gluconate 1,000 mg in sodium chloride 50 mL  1,000 mg IntraVENous Once Del Rasmussen MD         Current Outpatient Medications   Medication Sig Dispense Refill    traZODone (DESYREL) 50 MG tablet Take 1 tablet by mouth nightly as needed for Sleep 30 tablet 0    atorvastatin (LIPITOR) 40 MG tablet Take 1 tablet by mouth nightly 30 tablet 3    carvedilol (COREG) 12.5 MG tablet Take 1 tablet by mouth 2 times daily (with meals) 60 tablet 3    epoetin paola-epbx (RETACRIT) 16462 UNIT/ML SOLN injection Inject 1 mL into the skin three times a week To be given in HD by nurse 6.6 mL 1    amLODIPine (NORVASC) 10 MG tablet Take 1 tablet by mouth daily 30 tablet 3    sevelamer (RENVELA) 800 MG tablet Take 800 mg by mouth 3 times daily (with meals)      ranolazine (RANEXA) 500 MG extended release tablet Take 1 tablet by mouth 2 times daily 60 tablet 3    aspirin 81 MG EC tablet Take 1 tablet by mouth daily 30 tablet 0    clopidogrel (PLAVIX) 75 MG tablet Take 1 tablet by mouth daily 30 tablet 0    oxyCODONE-acetaminophen (PERCOCET) 5-325 MG per tablet Take 1.5 tablets by mouth daily. alogliptin (NESINA) 6.25 MG TABS tablet Take 1 tablet by mouth in the morning. 30 tablet 0    insulin lispro, 1 Unit Dial, (HUMALOG KWIKPEN) 100 UNIT/ML SOPN Inject 20 Units into the skin in the morning and 20 Units at noon and 20 Units in the evening. Inject before meals.  5 pen 0    insulin glargine (LANTUS SOLOSTAR) 100 UNIT/ML injection pen Inject 45 Units into the skin nightly 5 pen 0 ARIPiprazole (ABILIFY) 5 MG tablet Take 1 tablet by mouth at bedtime 30 tablet 0    pantoprazole (PROTONIX) 40 MG tablet Take 1 tablet by mouth in the morning and 1 tablet in the evening. Take before meals. 30 tablet 0    calcitRIOL (ROCALTROL) 0.25 MCG capsule Take 1 capsule by mouth in the morning. 30 capsule 0    Lancets MISC 1 each by Does not apply route daily 100 each 3    glucose monitoring kit (FREESTYLE) monitoring kit 1 each by Does not apply route once for 1 dose. 1 kit 0     Allergies   Allergen Reactions    Adhesive Tape Rash    Doxycycline Nausea And Vomiting    Reglan [Metoclopramide] Anxiety       Nursing Notes Reviewed    Physical Exam:     ED Triage Vitals [02/22/23 0807]   Enc Vitals Group      BP (!) 171/93      Heart Rate 91      Resp 16      Temp 97.6 °F (36.4 °C)      Temp Source Oral      SpO2 96 %      Weight       Height       Head Circumference       Peak Flow       Pain Score       Pain Loc       Pain Edu? Excl. in [address]? My pulse ox interpretation is - normal    General appearance: Moderate acute distress  Skin:  Warm. Dry. No pallor. No rash. Eye:  Normal conjuctiva. no Icterus. Ears, nose, mouth and throat:  Oral mucosa moist   Heart:  Regular rate and rhythm    Perfusion:  Capillary refill <2 seconds   Respiratory:  Respirations labored. expiratory wheezes noted, slight tachypnea. Mild rales in the bases bilaterally  Abdominal:  Soft. Nontender. Non distended.      Extremity:  No edema or tenderness  Neurological:  Alert and oriented,  Motor, sensory and coordination intact       I have reviewed and interpreted all of the currently available lab results from this visit (if applicable):  Results for orders placed or performed during the hospital encounter of 02/22/23   CBC with Auto Differential   Result Value Ref Range    WBC 7.4 4.0 - 10.5 K/CU MM    RBC 3.01 (L) 4.6 - 6.2 M/CU MM    Hemoglobin 8.9 (L) 13.5 - 18.0 GM/DL    Hematocrit 27.7 (L) 42 - 52 %    MCV 92.0 78 - 100 FL    MCH 29.6 27 - 31 PG    MCHC 32.1 32.0 - 36.0 %    RDW 14.0 11.7 - 14.9 %    Platelets 340 551 - 124 K/CU MM    MPV 9.8 7.5 - 11.1 FL    Differential Type AUTOMATED DIFFERENTIAL     Segs Relative 72.6 (H) 36 - 66 %    Lymphocytes % 16.0 (L) 24 - 44 %    Monocytes % 7.8 (H) 0 - 4 %    Eosinophils % 2.8 0 - 3 %    Basophils % 0.4 0 - 1 %    Segs Absolute 5.4 K/CU MM    Lymphocytes Absolute 1.2 K/CU MM    Monocytes Absolute 0.6 K/CU MM    Eosinophils Absolute 0.2 K/CU MM    Basophils Absolute 0.0 K/CU MM    Nucleated RBC % 0.0 %    Total Nucleated RBC 0.0 K/CU MM    Total Immature Neutrophil 0.03 K/CU MM    Immature Neutrophil % 0.4 0 - 0.43 %   Comprehensive Metabolic Panel   Result Value Ref Range    Sodium 137 135 - 145 MMOL/L    Potassium 4.4 3.5 - 5.1 MMOL/L    Chloride 96 (L) 99 - 110 mMol/L    CO2 24 21 - 32 MMOL/L    BUN 70 (H) 6 - 23 MG/DL    Creatinine 8.6 (H) 0.9 - 1.3 MG/DL    Est, Glom Filt Rate 7 (L) >60 mL/min/1.73m2    Glucose 192 (H) 70 - 99 MG/DL    Calcium 6.7 (LL) 8.3 - 10.6 MG/DL    Albumin 3.8 3.4 - 5.0 GM/DL    Total Protein 7.3 6.4 - 8.2 GM/DL    Total Bilirubin 0.2 0.0 - 1.0 MG/DL    ALT 9 (L) 10 - 40 U/L    AST 12 (L) 15 - 37 IU/L    Alkaline Phosphatase 68 40 - 128 IU/L    Anion Gap 17 (H) 4 - 16   Magnesium   Result Value Ref Range    Magnesium 2.1 1.8 - 2.4 mg/dl   Brain Natriuretic Peptide   Result Value Ref Range    Pro-BNP 23,017 (H) <300 PG/ML   Blood Gas, Venous   Result Value Ref Range    pH, Néstor 7.26 (L) 7.32 - 7.42    pCO2, Néstor 57 (H) 38 - 52 mmHG    pO2, Néstor 49 (H) 28 - 48 mmHG    Base Excess 2 0 - 3.3    HCO3, Venous 25.6 (H) 19 - 25 MMOL/L    O2 Sat, Néstor 77.1 (H) 50 - 70 %    Comment VBG    EKG 12 Lead   Result Value Ref Range    Ventricular Rate 90 BPM    Atrial Rate 90 BPM    P-R Interval 142 ms    QRS Duration 84 ms    Q-T Interval 394 ms    QTc Calculation (Bazett) 481 ms    P Axis -23 degrees    R Axis 94 degrees    T Axis 153 degrees    Diagnosis Normal sinus rhythm  Rightward axis  Low voltage QRS  Prolonged QT  Abnormal ECG  When compared with ECG of 07-FEB-2023 21:11,  No significant change was found        Radiographs (if obtained):  [] The following radiograph was interpreted by myself in the absence of a radiologist:   [x] Radiologist's Report Reviewed:  XR CHEST PORTABLE   Preliminary Result   Increased interstitial opacities bilaterally, suspicious for pulmonary edema. Infection can also be considered. Similar cardiomegaly. Procedures:  12 lead EKG per my interpretation:  Normal sinus rhythm  Rate: 90  QTc is   481  There are nonspecific T wave changes  There are no ST concerning segment changes    Prior EKG to compare with was available and is the same    (All EKG's are interpreted by myself in the absence of a cardiologist)    Critical Care: Total critical care time today provided was 34 minutes. This excludes seperately billable procedure. Critical care time provided for potential or impending respiratory failure and renal failure that required close evaluation and/or intervention with concern for patient decompensation. Chart review shows recent radiographs:  XR CHEST PORTABLE    Result Date: 2/7/2023  EXAMINATION: ONE XRAY VIEW OF THE CHEST 2/7/2023 9:24 pm COMPARISON: November 26, 2022 HISTORY: ORDERING SYSTEM PROVIDED HISTORY: sob TECHNOLOGIST PROVIDED HISTORY: Reason for exam:->sob Reason for Exam: sob FINDINGS: There is a right subclavian catheter with its tip at the superior cavoatrial junction. The cardiomediastinal silhouette is stable. There are increased lung markings bilaterally, may be related to mild pulmonary vascular congestion versus bronchitis. There is no pleural effusion. There is no pneumothorax. There is no acute osseous abnormality. Increased lung markings bilaterally, may be related to mild pulmonary vascular congestion versus bronchitis.          MDM:     Discussion with Other Professionals Darian Segura Admitting Team Dr. Jorje Brito and Consultant Dr. Sarah Harper    Social Determinants: None    Records Reviewed : Outpatient Notes showing history of ESRD on dialysis Monday Wednesday Friday    Chronic conditions affecting care: CKD    Labs ordered and results as above (interpreted by myself), concerning for mild anemia but similar to baseline, renal dysfunction but again close to baseline, calcium is low which was replaced, mild mixed acidosis  Imaging interpreted and reviewed by myself: CXR showed concerns for pulmonary edema  EKG interpreted by myself as above, not acutely concerning    Patient was given the following medications:  Medications   calcium gluconate 1,000 mg in sodium chloride 50 mL (has no administration in time range)       Disposition Discussion:  Plan is to hospitalize the patient for respiratory failure most likely secondary to fluid overload. He will need dialysis. I discussed the case with his nephrologist to arrange for dialysis today. Patient was also agreeable with plan of care. Initiated initially on BiPAP which is helping with his symptoms. Disposition: Hospitalized     I am the primary physician of record    Clinical Impression:  1. Acute respiratory failure with hypoxia ([facility]    2. ESRD (end stage renal disease) ([facility]    3. Hypocalcemia    4. Acute pulmonary edema (HCC)      Disposition referral (if applicable):  No follow-up provider specified. Disposition medications (if applicable):  New Prescriptions    No medications on file       Comment: Please note this report has been produced using speech recognition software and may contain errors related to that system including errors in grammar, punctuation, and spelling, as well as words and phrases that may be inappropriate. If there are any questions or concerns please feel free to contact the dictating provider for clarification.        Paul White MD  02/22/23 8941

## 2023-02-22 NOTE — PLAN OF CARE
Problem: Discharge Planning  Goal: Discharge to home or other facility with appropriate resources  Outcome: Progressing  Flowsheets (Taken 2/22/2023 1702)  Discharge to home or other facility with appropriate resources:   Identify barriers to discharge with patient and caregiver   Arrange for needed discharge resources and transportation as appropriate   Identify discharge learning needs (meds, wound care, etc)   Arrange for interpreters to assist at discharge as needed   Refer to discharge planning if patient needs post-hospital services based on physician order or complex needs related to functional status, cognitive ability or social support system

## 2023-02-22 NOTE — ED NOTES
ED TO INPATIENT SBAR HANDOFF    Patient Name: Lani Diaz   :    52 y.o. MRN:  [id number]  Preferred Name    ED Room #:  ED33/ED-33  Family/Caregiver Present no   Restraints no   Sitter no   Sepsis Risk Score Sepsis Risk Score: 1.36    Situation  Code Status: Prior No additional code details. Allergies: Adhesive tape, Doxycycline, and Reglan [metoclopramide]  Weight: No data found. Arrived from: home  Chief Complaint:   Chief Complaint   Patient presents with    Shortness of [place] Problem/Diagnosis:  Principal Problem:    Volume overload  Resolved Problems:    * No resolved hospital problems. *    Imaging:   XR CHEST PORTABLE   Preliminary Result   Increased interstitial opacities bilaterally, suspicious for pulmonary edema. Infection can also be considered. Similar cardiomegaly.            Abnormal labs:   Abnormal Labs Reviewed   CBC WITH AUTO DIFFERENTIAL - Abnormal; Notable for the following components:       Result Value    RBC 3.01 (*)     Hemoglobin 8.9 (*)     Hematocrit 27.7 (*)     Segs Relative 72.6 (*)     Lymphocytes % 16.0 (*)     Monocytes % 7.8 (*)     All other components within normal limits   COMPREHENSIVE METABOLIC PANEL - Abnormal; Notable for the following components:    Chloride 96 (*)     BUN 70 (*)     Creatinine 8.6 (*)     Est, Glom Filt Rate 7 (*)     Glucose 192 (*)     Calcium 6.7 (*)     ALT 9 (*)     AST 12 (*)     Anion Gap 17 (*)     All other components within normal limits   BRAIN NATRIURETIC PEPTIDE - Abnormal; Notable for the following components:    Pro-BNP 23,017 (*)     All other components within normal limits   BLOOD GAS, VENOUS - Abnormal; Notable for the following components:    pH, Néstor 7.26 (*)     pCO2, Néstor 57 (*)     pO2, Néstor 49 (*)     HCO3, Venous 25.6 (*)     O2 Sat, Néstor 77.1 (*)     All other components within normal limits     Critical values: yes     Abnormal Assessment Findings: Calcium 6.7    Background  History:   Past Medical History:   Diagnosis Date    Abscess 2010    scrotal    Acute renal failure (ARF) ([address] 08/04/2019    Anxiety associated with depression     Anxiety associated with depression     Back pain 07/02/2012    CAD (coronary artery disease) 2/10/2023    Chest pain 05/01/2013    Diabetic nephropathy ([address]     Diabetic neuropathy ([address] 12/22/2011    Diabetic ulcer of left midfoot associated with type 2 diabetes mellitus, with fat layer exposed ([address] 07/18/2017    Diverticulosis     C scope + Dr. Ramon Gaytan    DM (diabetes mellitus), type 2 ([address] 2002    DR. 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 Ramon Gaytan    Necrotizing fasciitis [facility])     Nose fracture 1988    Panic attacks     Pericarditis 2003    Hospitalized with s/p heart cath normal    Peripheral autonomic neuropathy due to diabetes mellitus ([address]     Axonal EMG- NCS, March 2011    Sebaceous cyst 09/01/2011    URI (upper respiratory infection) 02/27/2012    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 type 2 diabetes mellitus, with muscle involvement without evidence of necrosis ([address] 9/21/2021    WD-Wound, surgical, infected, initial encounter 12/08/2017    Wrist fracture 1986       Assessment    Vitals/MEWS:        Vitals:    02/22/23 0807 02/22/23 0811 02/22/23 0818 02/22/23 0827   BP: (!) 171/93 (!) 171/93     Pulse: 91 91 94 90   Resp: 16 23 21 27   Temp: 97.6 °F (36.4 °C)      TempSrc: Oral      SpO2: 96% 95% 97% 100%     FiO2 (%): nasal cannula for respiratory distress/Bi-pap  O2 Flow Rate: O2 Device: PAP (positive airway pressure) O2 Flow Rate (L/min): 3 L/min  Cardiac Rhythm: NSR  Pain Assessment:  [] Verbal [] Quilla Bogaert Scale  Pain Scale:    Last documented pain score (0-10 scale)    Last documented pain medication administered:   Mental Status: oriented  NIH Score: NIH     C-SSRS: Risk of Suicide: No Risk  Bedside swallow:    Bedford Coma Scale (GCS): Rosanna Coma Scale  Eye Opening: Spontaneous  Best Verbal Response: Oriented  Best Motor Response: Obeys commands  Bedford Coma Scale Score: 15  Active LDA's:    PO Status: Regular  Pertinent or High Risk Medications/Drips: no   If Yes, please provide details:   Pending Blood Product Administration: NO     You may also review the ED PT Care Timeline found under the Summary Nursing Index tab. Recommendation    Pending orders- Dialysis   Plan for Discharge (if known):    Additional Comments:    If any further questions, please [place] RN at [phone]    Electronically signed by: Electronically signed by Donna Gan RN on 2/22/2023 at 10:55 AM      Pooja Whitaker RN  02/22/23 030 [facility] ROBIN Coronado  02/22/23 5956

## 2023-02-22 NOTE — PROGRESS NOTES
Nephrology  Dialysis Note        [address], [address] [facility], [place] [address]  Phone: [phone]  Office Hours: 8:30AM - 4:30PM  Monday - Friday          PROCEDURE:  Patient seen during hemodialysis      PHYSICIAN:  ANTHONY      INDICATION:  End-stage renal disease      RX:  See dialysis flowsheet for specifics on access, blood flow rate, dialysate baths, duration of dialysis, anticoagulation and other technical information.       COMMENTS:  seen in hd  At least 3L removed  Will plan DAILy HD until euvolemic      Electronically signed by Johnny Qureshi DO on 2/22/2023 at [address] [facility] [address],  [place]  [facility], [place] [address]  PHONE: [phone]  FAX: [phone]

## 2023-02-22 NOTE — PROGRESS NOTES
4 Eyes Skin Assessment     NAME:  Jason Members OF BIRTH:  1975  MEDICAL RECORD NUMBER:  [id number]    The patient is being assessed for  Admission    I agree that One RN has performed a thorough Head to Toe Skin Assessment on the patient. ALL assessment sites listed below have been assessed. Areas assessed by both nurses:    Head, Face, Ears, Shoulders, Back, Chest, Arms, Elbows, Hands, Sacrum. Buttock, Coccyx, Ischium, and Legs. Feet and Heels        Does the Patient have a Wound? Yes wound(s) were present on assessment.  LDA wound assessment was Initiated and completed by RN       Pradeep Prevention initiated by RN: No   Wound Care Orders initiated by RN: Yes    Pressure Injury (Stage 3,4, Unstageable, DTI, NWPT, and Complex wounds) if present, place referral order by RN under : yes; see LDA from 9/1/22    New and Established Ostomies, if present place, referral order under : No      Nurse 1 eSignature: Electronically signed by Misbah Rascon RN on 2/22/23 at 5:50 PM EST    **SHARE this note so that the co-signing nurse can place an eSignature**    Nurse 2 eSignature: Electronically signed by Kirt Mckeon RN on 2/22/23 at 5:53 PM EST

## 2023-02-22 NOTE — ED NOTES
Medication History  [facility]    Patient Name: Ankit Conner 1975     Medication history has been completed by: Deja Bess CPhT    Source(s) of information: patient's wife via phone and insurance claims     Primary Care Physician: Ricik Roque MD     Pharmacy: [facility]    Allergies as of 02/22/2023 - Fully Reviewed 02/22/2023   Allergen Reaction Noted    Adhesive tape Rash 10/15/2013    Doxycycline Nausea And Vomiting 07/09/2018    Reglan [metoclopramide] Anxiety 10/07/2018        Prior to Admission medications    Medication Sig Start Date End Date Taking? Authorizing Provider   carvedilol (COREG) 25 MG tablet Take 25 mg by mouth 2 times daily (with meals)   Yes Historical Provider, MD   calcium carbonate (TUMS) 500 MG chewable tablet Take 2 tablets by mouth 3 times daily (with meals)   Yes Historical Provider, MD   lisinopril (PRINIVIL;ZESTRIL) 2.5 MG tablet Take 2.5 mg by mouth daily   Yes Historical Provider, MD   epoetin paola-epbx (RETACRIT) 75269 UNIT/ML SOLN injection Inject 1 mL into the skin three times a week To be given in HD by nurse 9/16/22   Anamaria Sorto MD   aspirin 81 MG EC tablet Take 1 tablet by mouth daily 8/25/22   Alejo Sinclair MD   clopidogrel (PLAVIX) 75 MG tablet Take 1 tablet by mouth daily 8/25/22   Alejo Sinclair MD   oxyCODONE-acetaminophen (PERCOCET) 7.5-325 MG per tablet Take 1 tablet by mouth every 8 hours as needed.     Historical Provider, MD   insulin lispro, 1 Unit Dial, (HUMALOG KWIKPEN) 100 UNIT/ML SOPN Inject 35 Units into the skin 3 times daily (before meals) 8/3/22   Martina Kendrick MD   insulin glargine (LANTUS SOLOSTAR) 100 UNIT/ML injection pen  Inject 75 Units into the skin nightly 8/3/22   Martina Kendrick MD   ARIPiprazole (ABILIFY) 5 MG tablet Take 1 tablet by mouth at bedtime 8/3/22   Martina Kendrick MD   Lancets MISC 1 each by Does not apply route daily 9/21/18   Shanta Krishna MD   glucose monitoring kit (FREESTYLE) monitoring kit 1 each by Does not apply route once for 1 dose. 6/20/13 6/20/13  Charlotte Zarate MD     Medications added or changed (ex. new medication, dosage change, interval change, formulation change):  Lisinopril 2.5 mg daily (added)  Carvedilol dosage change from 12.5 mg to 25 mg BID (12.5 mg ordered)  Percocet dosage change from 5/325 mg to 7.5/325 mg TID  Tums 1000 mg TID with meals (added)  Lantus dosage change from 45 units at HS to 75 units at HS  Humalog dosage change from 20 units with meals to 35 units with meals    Medications removed from list (include reason, ex. noncompliance, medication cost, therapy complete etc.):   Trazodone not actively taking (ordered)  Sevelamer not actively taking (ordered)  Atorvastatin not actively taking (ordered)  Amlodipine not actively taking (ordered)  Calcitriol not actively taking   Chlorthalidone discontinued 08/03/22  Furosemide discontinued 08/03/22  Novolog on Humalog  Linagliptin discontinued 08/03/22  Metoprolol discontinued 08/03/22  Pantoprazole not actively taking   Ranolazine not actively taking  Trazodone not actively taking (ordered)  Sevelamer not actively taking (ordered)  Atorvastatin not actively taking (ordered)  Amlodipine not actively taking (ordered)    Medications requiring reconciliation with provider:    Lisinopril 2.5 mg daily (added)  Carvedilol dosage change from 12.5 mg to 25 mg BID (12.5 mg ordered)  Percocet dosage change from 5/325 mg to 7.5/325 mg TID  Tums 1000 mg TID with meals (added)  Lantus dosage change from 45 units at HS to 75 units at HS  Humalog dosage change from 20 units with meals to 35 units with meals    Comments:  Unable to assess patient. Reached out to wife via phone. Patient's wife read bottles of medications. Insulin updated per information received.     To my knowledge the above medication history is accurate as of 2/22/2023 10:24 AM.   Yessy Díaz CPhT   2/22/2023 10:24 AM

## 2023-02-22 NOTE — H&P
History and Physical 23        NAME: Reza Lind  : 1975  MRN: [id number]      Assessment/Plan:  Reza Lind is a 52 y.o. male with a history of ESRD on HD (MWF), CAD, chronic normocytic anemia, chronically elevated troponins, hypertension, type II DM, HLD, depression and insomnia. who presented to [facility] 2023 with progressively worsening SOB in the setting of volume overload. Plan:  #Acute on chronic hypoxic respiratory failure in setting of worsening volume overload  --Patient currently on BiPAP, from his last admission, he required and was discharged with home O2 especially for ambulation  --We will attempt to wean off BiPAP and put on supplemental oxygen via nasal cannula as able  --Planned for   --Repeat VBG postdialysis    #ESRD on hemodialysis  --Patient is on dialysis , reports that he mated to dialysis on Monday but developed worsening shortness of breath into this morning.  --Still makes urine, discussed with the ED, patient to receive a dose of 40 mg of Lasix, plan to continue but will defer to nephrology  --Plan for dialysis this morning  --Nephrology on board, appreciate recommendations  --Avoid nephrotoxins and renally dose meds    #Hypocalcemia  --Patient appears to be chronically hypocalcemic, presented with a calcium of 6.7.   Repleted in the ED  --Trend BMP and replete calcium as necessary  --Restarted on daily calcitriol    #CAD  --History of CAD status post PCI with stents to the LAD and left circumflex  --Continue the aspirin, plavix, and BB     #Elevated BNP  --BNP elevated to 23,000  --Follow-up 2D echo, last echo is from  showed an EF of 50 to 55% with normal LV SF  --Consider cardio consult pending 2D echo    #Hypertension  --Patient hypertensive on presentation, home regimen is lisinopril and carvedilol  --Restart his home regimen, monitor his trends and adjust therapy as necessary    #Type 2 diabetes on long-term insulin  --Patient on bolus regimen of Lantus and Humalog  --We will start on 25 units of Lantus nightly which is about half his home dose and 10 of Humalog which is half of his bolus premeal  --Continue Accu-Cheks ACHS and cover with sliding scale insulin  --Follow-up A1c and follow hypoglycemic protocol as needed    #Hyperlipidemia  --Continue statin    #Depression  --Continue Abilify     #Insomnia  --Previously on trazodone, restart as needed    #Chronic pain  --Patient on Percocet at home, will restart as needed    #Morbid obesity  --BMI 42.38, discussed lifestyle modifications including diet and lifestyle changes      DVT Prophylaxis: Heparin  Code Status/Surrogate Decision Maker: Full code/wife      Current living situation: Home  Expected Disposition: Home  Estimated discharge date: 1-2 days      Chief Complaint:    Progressively worsening SOB    History of Present Illness:    Patient is a 49-year-old gentleman with a past medical history of ESRD on HD (MWF), CAD, chronic normocytic anemia, chronically elevated troponins, hypertension, type II DM, HLD, depression and insomnia. He reports he was due for dialysis at 11 AM today, but due to the shortness of breath that he felt beginning yesterday into this morning he decided to come to the ED for evaluation. On presentation to the ED secondary to his hypoxia and difficulty breathing, patient was placed on BiPAP. Chest x-ray showed increased interstitial opacities bilaterally suspicious for pulmonary edema. Venous blood gas showed pH of 7.26, PCO2 of 57, PO2 of 49 and a bicarb of 25.6. CBC was unremarkable, CHEM panel was notable for hypocalcemia to 6.7, creatinine of 8.6 and a BUN of 70 with an EGFR of 7. BNP is elevated at 23,000. Patient is being admitted for hypoxic respiratory failure in the setting of volume overload secondary to ESRD. ROS:    Review of Systems   Constitutional:  Positive for activity change. HENT: Negative. Eyes: Negative.     Respiratory: Positive for shortness of breath. Cardiovascular:  Positive for leg swelling. Gastrointestinal:  Positive for abdominal pain. Genitourinary: Negative. Musculoskeletal:         Chronic pain   Skin: Negative. Neurological: Negative. Psychiatric/Behavioral: Negative. Past Medical, Surgical, Social, Family History:   Past Medical History:   Diagnosis Date    Abscess 2010    scrotal    Acute renal failure (ARF) ([address] 08/04/2019    Anxiety associated with depression     Anxiety associated with depression     Back pain 07/02/2012    CAD (coronary artery disease) 2/10/2023    Chest pain 05/01/2013    Diabetic nephropathy ([address]     Diabetic neuropathy ([address] 12/22/2011    Diabetic ulcer of left midfoot associated with type 2 diabetes mellitus, with fat layer exposed ([address] 07/18/2017    Diverticulosis     C scope + Dr. Alisha Washington    DM (diabetes mellitus), type 2 ([address] 2002    DR. 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 Alisha Washington    Necrotizing fasciitis [facility])     Nose fracture 1988    Panic attacks     Pericarditis 2003    Hospitalized with s/p heart cath normal    Peripheral autonomic neuropathy due to diabetes mellitus ([address]     Axonal EMG- NCS, March 2011    Sebaceous cyst 09/01/2011    URI (upper respiratory infection) 02/27/2012    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 type 2 diabetes mellitus, with muscle involvement without evidence of necrosis ([address] 9/21/2021    WD-Wound, surgical, infected, initial encounter 12/08/2017    Wrist fracture 1986     Past Surgical History:   Procedure Laterality Date    ABDOMEN SURGERY      CARDIAC CATHETERIZATION  2003, 2013    Normal (Dr. Therese Padilla)    COLONOSCOPY  06/02/2011    Pandiverticulosis, Nonbleeding internal hemorrhoids, Repeat colonoscopy at age 48- Dr. Prince Andrea      x 3    FOOT SURGERY Left 12/21/2021    EXCISION OF HEAD LEFT 2ND METATARSAL performed by Yulissa Grijalva DPM at [address] NONTUNNELED VASCULAR CATHETER  07/25/2022    IR NONTUNNELED VASCULAR CATHETER 7/25/2022 [facility] SPECIAL PROCEDURES    IR TUNNELED CATHETER PLACEMENT GREATER THAN 5 YEARS  07/27/2022    IR TUNNELED CATHETER PLACEMENT GREATER THAN 5 YEARS 7/27/2022 [facility] SPECIAL PROCEDURES    IR TUNNELED CATHETER PLACEMENT GREATER THAN 5 YEARS  11/28/2022    IR TUNNELED CATHETER PLACEMENT GREATER THAN 5 YEARS 11/28/2022 [facility] SPECIAL PROCEDURES    NOSE SURGERY  1993    \"had reconstruction surgery on nose a year after the other nose surgery\"    OTHER SURGICAL HISTORY Right 05/22/2015    I & D right great toe with partial amputation    OTHER SURGICAL HISTORY  12/04/2017    inc and drainage of abcess    [place] INCISION BONE CORTEX FOOT Left 09/22/2018    LEFT FOOT DEBRIDEMENT INCISION AND DRAINAGE TOP AND BOTTOM performed by Willey Primrose, DPM at [facility] N/A 05/15/2021    SCROTAL AND PERINEAL DEBRIDEMENT performed by Leander Frazier MD at [place] [address] 05/16/2021    SCROTAL RE-DEBRIDEMENT  INCISION AND DRAINAGE performed by Leander Frazier MD at [address] [facility] [address] N/A 06/18/2013    sebaceous cyst removal times 4     TOE AMPUTATION      right great toe    TOE AMPUTATION Right 03/23/2019    TOE AMPUTATION RIGHT 5TH TOE performed by Willey Primrose, DPM at [address] Right 08/06/2019    TOE AMPUTATION RIGHT 4TH TOE performed by Willey Primrose, DPM at [address] [facility] ENDOSCOPY  06/02/2011    Mild gastritis with moderatley severe antritis, small hiatal hernia, Dr. Jatinder Horn N/A 01/12/2019    EGD BIOPSY performed by Cristiano Duque MD at [place] [address] N/A 07/26/2022    EGD DIAGNOSTIC ONLY performed by Antony Pinto MD at [address] History    Marital status:      Spouse name: Not on file    Number of children: Not on file    Years of education: Not on file    Highest education level: Not on file   Occupational History    Not on file   Tobacco Use    Smoking status: Former     Years: 20.00     Types: Cigarettes     Quit date: 2022     Years since quittin.6    Smokeless tobacco: Never    Tobacco comments:     Smokes when drinking/social   Vaping Use    Vaping Use: Never used   Substance and Sexual Activity    Alcohol use: Not Currently     Comment: occ    Drug use: Yes     Frequency: 7.0 times per week     Types: Marijuana Aloma Zaki)     Comment: 21 @     Sexual activity: Yes     Partners: Female   Other Topics Concern    Not on file   Social History Narrative    Not on file     Social Determinants of Health     Financial Resource Strain: Not on file   Food Insecurity: Not on file   Transportation Needs: Not on file   Physical Activity: Not on file   Stress: Not on file   Social Connections: Not on file   Intimate Partner Violence: Not on file   Housing Stability: Not on file     Family History   Problem Relation Age of Onset    Cancer Mother         ?site    Stroke Mother     Bleeding Prob Mother     Diabetes Father     Heart Disease Father     High Blood Pressure Father     Obesity Father     Kidney Disease Father     Diabetes Sister     High Blood Pressure Sister     Mental Illness Sister     Obesity Sister     High Blood Pressure Other     Mental Illness Other         bipolar    Other Son         cyst in ear canal    ADHD Daughter        Home Medications:  Prior to Admission medications    Medication Sig Start Date End Date Taking?  Authorizing Provider   traZODone (DESYREL) 50 MG tablet Take 1 tablet by mouth nightly as needed for Sleep 22   Debra Ibarra MD   atorvastatin (LIPITOR) 40 MG tablet Take 1 tablet by mouth nightly 9/14/22   Coreen Retana MD   carvedilol (COREG) 12.5 MG tablet Take 1 tablet by mouth 2 times daily (with meals) 9/14/22   Coreen Retana MD   epoetin paola-epbx (RETACRIT) 06382 UNIT/ML SOLN injection Inject 1 mL into the skin three times a week To be given in HD by nurse 9/16/22   Coreen Retana MD   amLODIPine (NORVASC) 10 MG tablet Take 1 tablet by mouth daily 9/14/22   Coreen Retana MD   sevelamer (RENVELA) 800 MG tablet Take 800 mg by mouth 3 times daily (with meals) 8/9/22   Historical Provider, MD   ranolazine (RANEXA) 500 MG extended release tablet Take 1 tablet by mouth 2 times daily 9/6/22   Lexi Montes MD   aspirin 81 MG EC tablet Take 1 tablet by mouth daily 8/25/22   Leo Alvarez MD   clopidogrel (PLAVIX) 75 MG tablet Take 1 tablet by mouth daily 8/25/22   Leo Alvarez MD   metoprolol tartrate (LOPRESSOR) 25 MG tablet Take 0.5 tablets by mouth 2 times daily 8/25/22 9/14/22  Leo Alvarez MD   oxyCODONE-acetaminophen (PERCOCET) 5-325 MG per tablet Take 1.5 tablets by mouth daily. Historical Provider, MD   alogliptin (NESINA) 6.25 MG TABS tablet Take 1 tablet by mouth in the morning. 8/4/22   Francisca Arreguin MD   insulin lispro, 1 Unit Dial, (HUMALOG KWIKPEN) 100 UNIT/ML SOPN Inject 20 Units into the skin in the morning and 20 Units at noon and 20 Units in the evening. Inject before meals. 8/3/22   Francisca Arreguin MD   insulin glargine (LANTUS SOLOSTAR) 100 UNIT/ML injection pen Inject 45 Units into the skin nightly 8/3/22   Francisca Arreguin MD   ARIPiprazole (ABILIFY) 5 MG tablet Take 1 tablet by mouth at bedtime 8/3/22   Francisca Arreguin MD   pantoprazole (PROTONIX) 40 MG tablet Take 1 tablet by mouth in the morning and 1 tablet in the evening. Take before meals. 8/3/22   Francisca Arreguin MD   calcitRIOL (ROCALTROL) 0.25 MCG capsule Take 1 capsule by mouth in the morning.  8/4/22   Francisca Arreguin MD   furosemide (LASIX) 40 MG tablet Take 1 tablet by mouth daily 9/22/21 8/3/22  John Lopez, MD   insulin aspart (NOVOLOG) 100 UNIT/ML injection vial Inject 35 Units into the skin 3 times daily (before meals)  8/3/22  Historical Provider, MD   chlorthalidone (HYGROTON) 25 MG tablet Take 1 tablet by mouth daily 6/15/21 8/3/22  Prateek Thurston MD   linagliptin (TRADJENTA) 5 MG tablet Take 1 tablet by mouth daily 3/29/21 8/3/22  Prateek Thurston MD   Lancets MISC 1 each by Does not apply route daily 9/21/18   Yesy Paez MD   glucose monitoring kit (FREESTYLE) monitoring kit 1 each by Does not apply route once for 1 dose. 6/20/13 6/20/13  Vivi Echevarria MD         Physical Exam:     BP (!) 171/93   Pulse 90   Temp 97.6 °F (36.4 °C) (Oral)   Resp 27   SpO2 100%       General: On BiPAP, dyspneic with conversation  Eyes: EOMI  ENT: Moist mucous membrane  Cardiovascular: Mildly tachycardic. Respiratory: Anteriorly CTAB, difficulty auscultating posteriorly d/t habitus  Gastrointestinal: Central obesity, non-tender to palpation  Genitourinary: no suprapubic tenderness  Musculoskeletal: +1-2 pitting edema  Skin: warm, dry  Neuro: Alert. Psych: Mood appropriate. Labs, Imaging, and Studies reviewed:    XR CHEST PORTABLE    Result Date: 2/22/2023  EXAMINATION: ONE XRAY VIEW OF THE CHEST 2/22/2023 8:14 am COMPARISON: 02/07/2023 HISTORY: ORDERING SYSTEM PROVIDED HISTORY: Shortness of Breath TECHNOLOGIST PROVIDED HISTORY: Reason for exam:->Shortness of Breath Reason for Exam: sob FINDINGS: The heart is enlarged but is similar in size to the prior study. No definite pleural effusion or pneumothorax is seen. Increased interstitial opacities bilaterally. Similar appearance of the right-sided CVC. Increased interstitial opacities bilaterally, suspicious for pulmonary edema. Infection can also be considered. Similar cardiomegaly.        CBC:   Recent Labs     02/22/23  0825   WBC 7.4   HGB 8.9*        BMP:    Recent Labs     02/22/23  0825      K 4.4   CL 96*   CO2 24   BUN 70*   CREATININE 8.6*   GLUCOSE 192*     Hepatic:   Recent Labs     02/22/23  0825   AST 12*   ALT 9*   BILITOT 0.2   ALKPHOS 68     Lipids:   Lab Results   Component Value Date/Time    CHOL 166 09/22/2022 02:02 PM    CHOL 168 10/15/2013 10:30 AM    HDL 30 09/22/2022 02:02 PM    TRIG 254 09/22/2022 02:02 PM     Hemoglobin A1C:   Lab Results   Component Value Date/Time    LABA1C 5.7 09/14/2022 03:47 AM     TSH: No results found for: TSH  Troponin:   Lab Results   Component Value Date/Time    TROPONINT 0.176 02/08/2023 05:45 AM    TROPONINT 0.183 02/07/2023 09:17 PM    TROPONINT 0.155 09/13/2022 11:49 AM     Lactic Acid: No results for input(s): LACTA in the last 72 hours.   BNP:   Recent Labs     02/22/23  0825   PROBNP 23,017*     UA:  Lab Results   Component Value Date/Time    NITRU NEGATIVE 07/24/2022 11:40 PM    COLORU YELLOW 07/24/2022 11:40 PM    PHUR 6.5 06/20/2013 09:51 AM    WBCUA 2 07/24/2022 11:40 PM    RBCUA 1 07/24/2022 11:40 PM    MUCUS RARE 07/24/2022 11:40 PM    TRICHOMONAS NONE SEEN 07/24/2022 11:40 PM    BACTERIA NEGATIVE 07/24/2022 11:40 PM    CLARITYU CLEAR 07/24/2022 11:40 PM    SPECGRAV 1.020 07/24/2022 11:40 PM    LEUKOCYTESUR NEGATIVE 07/24/2022 11:40 PM    UROBILINOGEN 0.2 07/24/2022 11:40 PM    BILIRUBINUR NEGATIVE 07/24/2022 11:40 PM    BILIRUBINUR neg 06/20/2013 09:51 AM    BLOODU SMALL NUMBER OR AMOUNT OBSERVED 07/24/2022 11:40 PM    GLUCOSEU 1,000 06/20/2013 09:51 AM    KETUA NEGATIVE 07/24/2022 11:40 PM    AMORPHOUS RARE 07/24/2022 11:40 PM     Urine Cultures: No results found for: LABURIN  Blood Cultures: No results found for: BC  No results found for: BLOODCULT2  Organism:   Lab Results   Component Value Date/Time    ORG SAUR 01/07/2019 12:08 AM             Electronically signed by Jorje Brito MD on 2/22/2023 at 9:39 AM

## 2023-02-22 NOTE — PROGRESS NOTES
Patient Name: Ruth Steward  Patient : 1975  MRN: [id number]     Acct: [de-identified]  Date of Admission: 2023  Room/Bed: 3116/3116-A  Code Status:  Full Code  Allergies:    Allergies   Allergen Reactions    Adhesive Tape Rash    Doxycycline Nausea And Vomiting    Reglan [Metoclopramide] Anxiety     Diagnosis:    Patient Active Problem List   Diagnosis    Hiatal hernia    Diabetes mellitus type 2, improved controlled    Diabetic neuropathy (HCC)    Panic attack    Anxiety associated with depression    Neck pain    DANIELA (obstructive sleep apnea)    Urinary frequency    Bilateral carpal tunnel syndrome- left worse than right    Hyperlipidemia with target LDL less than 100    Essential hypertension    Bronchitis    Osteomyelitis (HCC)    Abscess    Periumbilical hernia    Sepsis (HCC)    Cellulitis of left foot    Constipation    Intractable nausea and vomiting    Uncontrolled type 2 diabetes mellitus    Nausea and vomiting    Diabetic foot infection (HCC)    Acute renal failure (ARF) (HCC)    Cellulitis    Cellulitis of left arm    Cellulitis, scrotum    Acute epididymitis    Irene gangrene    Recurrent major depressive disorder, in full remission ([address]    Generalized anxiety disorder    Morbid obesity with body mass index (BMI) of 40.0 to 44.9 in [facility])    Necrotizing fasciitis (HCC)    Syncope    Right groin wound    Ulcer of right groin with fat layer exposed ([address]    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 type 2 diabetes mellitus, with muscle involvement without evidence of necrosis (HCC)    Nephrotic syndrome    Generalized edema    Stage 3b chronic kidney disease (HCC)    Diabetic nephropathy associated with type 2 diabetes mellitus (HCC)    Hypoalbuminemia    Chronic kidney disease, stage IV (severe) (HCC)    FH: CAD (coronary artery disease)    ASCVD (arteriosclerotic cardiovascular disease)    Other proteinuria    Chest pain    Surgical wound dehiscence    CARMEN (acute kidney injury) ([facility]    Anemia    Chest tightness    NSTEMI (non-ST elevated myocardial infarction) ([facility])    WD - Ulcer of forefoot due to type 2 diabetes mellitus ([facility]    ESRD (end stage renal disease) ([facility]    Problem with vascular access    Fluid overload    Acute on chronic respiratory failure with hypoxia (HCC)    CAD (coronary artery disease)    Volume overload         Treatment:  Hemodilaysis 2:1  Priority: Routine  Location: Acute Room    Diabetic: Yes  NPO: No  Isolation Precautions: Dialysis     Consent for Treatment Verified: Yes  Blood Consent Verified: Not Applicable     Safety Verified: Identify (I), Consent (C), Equipment (E), HepB Status (B), Orders Complete (O), Access Verified (A), and Timeliness (T)  Time out performed prior to access at 1210 hours. Report Received from Primary RN at 1130 hours. Primary RN (First Initial, Last Name, Title): DASHAWN Weiss RN  Incapacitated Nurse Education Completed: Not Applicable     HBsAg ONLY:  Date Drawn: February 13, 2023       Results: Negative  HBsAb:  Date Drawn:  February 22, 2023       Results: Unknown    Order  Dialysis Bath  K+ (Potassium): 3  Ca+ (Calcium):  (3.5)  Na+ (Sodium): 137  HCO3 (Bicarb): 35  Bicarbonate Concentrate Lot No.: [id number]  Acid Concentrate Lot No.: [id number]     Na+ Modeling: Not Applicable  Dialyzer: [id number]  Dialysate Temperature (C):  36  Blood Flow Rate (BFR):  300   Dialysate Flow Rate (DFR):   800        Access to be Utilized   Access:  Tunneled Catheter  Location: Internal Jugular  Side: Right   Needle gauge:  Not Applicable  + Bruit/Thrill: Not Applicable    First Use X-ray Verified: Not Applicable  OK to use line order: Not Applicable    Site Assessment:  Signs and Symptoms of Infection/Inflammation: None  If yes: Not Applicable  Dressing: Dry and Intact  Site Prep: Medical Aseptic Technique  Dressing Changed this Treatment: Yes  If yes, by whom: Jose Garcia RN  Date of Last Dressing Change: February 22, 2023  Antimicrobial Patch in place?: Yes  Red Alcohol Caps in place?: Yes  Gauze Dressing?: No  Non Dialysis Use?: No  Comment:    Flows: Good, Patent  If access problem, who was notified:     Pre and Post-Assessment  Patient Vitals for the past 8 hrs:   Level of Consciousness Oriented X Heart Rhythm Respiratory Quality/Effort O2 Device Bilateral Breath Sounds Skin Condition/Temp Abdomen Inspection Bowel Sounds (All Quadrants) Edema Edema Generalized   02/22/23 1200 0 4 Regular Unlabored Nasal cannula Diminished Dry; Warm Obese; Soft Active Generalized Non-pitting     Labs  Recent Labs     02/22/23  0825   WBC 7.4   HGB 8.9*   HCT 27.7*                                                                     Recent Labs     02/22/23  0825      K 4.4   CL 96*   CO2 24   BUN 70*   CREATININE 8.6*   GLUCOSE 192*     IV Drips and Rate/Dose   sodium chloride      dextrose        Safety - Before each treatment:   Dialysis Machine No.: [id number]   Machine Number: [id number]  Dialyzer Lot No.: [id number]  RO Machine Log Sheet Completed: Yes  Machine Alarm Self Test: Completed; Passed (02/22/23 1200)     Air Foam Detector: Tested, Proper Function, pH Reading  Extracorporeal Circuit Tested for Integrity: Yes  Machine Conductivity: 13.6  Manual Conductivity: 13.8     Bicarbonate Concentrate Lot No.: [id number]  Acid Concentrate Lot No.: [id number]  Manual Ph: 7.4  Bleach Test (Neg): Yes  Bath Temperature: 96.8 °F (36 °C)  Tubing Lot#:   [id number]  Conductivity Meter Serial #: [id number]  All Connections Secure?: Yes  Venous Parameters Set?: Yes  Arterial Parameters Set?: Yes  Saline Line Double Clamped?: Yes  Air Foam Detector Engaged?: Yes  Machine Functioning Alarm Free?  Yes  Prime Given: 200ml    Chlorine Testing - Before each treatment and every 4 hours:   Treatment  Time On: 1220     1st check: less than 0.1 ppm at: 1035 hours  2nd check: less than 0.1 ppm at: 1350 hours  3rd check: Not Applicable  (if greater than 0.1 ppm, then check every 30 minutes from secondary)    Access Flows and Pressures  Patient Vitals for the past 8 hrs:   Blood Flow Rate (ml/min) Ultrafiltration Rate (ml/hr) Arterial Pressure (mmHg) Venous Pressure (mmHg) TMP DFR Comments Access Visible   02/22/23 1220 300 ml/min 1130 ml/hr -80 mmHg 90 50 800 tx initiated Yes   02/22/23 1230 300 ml/min 1130 ml/hr -80 mmHg 90 50 800 lines secure Yes   02/22/23 1245 300 ml/min 1130 ml/hr -70 mmHg 100 50 800 on phone Yes   02/22/23 1300 300 ml/min 1130 ml/hr -60 mmHg 100 50 800 on phone Yes   02/22/23 1315 300 ml/min 1130 ml/hr -60 mmHg 100 50 800 no complaints Yes   02/22/23 1330 300 ml/min 1130 ml/hr -60 mmHg 100 60 800 eating snack Yes   02/22/23 1345 300 ml/min 1130 ml/hr -60 mmHg 100 60 800 resting Yes   02/22/23 1400 300 ml/min 1130 ml/hr -60 mmHg 100 60 800 snoring Yes   02/22/23 1415 300 ml/min 1130 ml/hr -80 mmHg 90 60 800 resting Yes   02/22/23 1430 300 ml/min 1130 ml/hr -80 mmHg 90 60 800 no changes Yes   02/22/23 1445 300 ml/min 1130 ml/hr -80 mmHg 90 60 800 resting Yes   02/22/23 1500 300 ml/min 1130 ml/hr -80 mmHg 90 60 800 denies needs Yes   02/22/23 1515 300 ml/min 1130 ml/hr -80 mmHg 90 60 800 resting Yes   02/22/23 1530 300 ml/min 1130 ml/hr -80 mmHg 90 60 800 no complaints Yes   02/22/23 1545 300 ml/min 1130 ml/hr -80 mmHg 90 60 800 denies needs Yes   02/22/23 1600 300 ml/min 1130 ml/hr -80 mmHg 90 60 800 patient repositioned self Yes   02/22/23 1615 300 ml/min 1130 ml/hr -80 mmHg 210 120 800 Dr Hernandez Dose here to see patient Yes     Vital Signs  Patient Vitals for the past 8 hrs:   BP Temp Pulse Resp SpO2   02/22/23 1102 (!) 150/89 -- 85 16 99 %   02/22/23 1104 -- 97.6 °F (36.4 °C) -- -- --   02/22/23 1200 (!) 148/89 98.1 °F (36.7 °C) 86 18 99 %   02/22/23 1220 (!) 152/83 98.1 °F (36.7 °C) 86 21 99 %   02/22/23 1230 (!) 152/83 -- 86 -- --   02/22/23 1245 (!) 142/98 -- 86 27 --   02/22/23 1300 (!) 146/87 -- 85 18 --   02/22/23 1315 (!) 158/74 -- 85 18 --   02/22/23 1330 (!) 148/89 -- 85 19 -- 02/22/23 1345 (!) 138/94 -- 87 15 --   02/22/23 1400 (!) 118/104 -- 88 18 --   02/22/23 1415 135/79 -- 87 13 --   02/22/23 1430 (!) 133/93 -- 85 20 --   02/22/23 1445 135/82 -- 84 11 --   02/22/23 1500 (!) 142/79 -- 87 19 --   02/22/23 1515 (!) 148/80 -- 85 15 --   02/22/23 1530 (!) 152/80 -- 87 16 --   02/22/23 1545 (!) 145/83 -- 85 13 --   02/22/23 1600 (!) 147/83 -- 84 15 --   02/22/23 1615 (!) 136/117 97.7 °F (36.5 °C) 86 18 --   02/22/23 1630 -- -- 93 15 --     Post-Dialysis  Arterial Catheter Locking Solution:  NS  Venous Catheter Locking Solution:  NS  Post-Treatment Procedures: Blood returned, Catheter Capped, clamped with Saline x2 ports  Machine Disinfection Process: Acid/Vinegar Clean, Heat Disinfect, Exterior Machine Disinfection  Rinseback Volume (ml): 300 ml  Blood Volume Processed (Liters): 65.9 l/min  Dialyzer Clearance: Heavily streaked  Duration of Treatment (minutes): 200 minutes     Hemodialysis Intake (ml): 500 ml  Hemodialysis Output (ml): 4250 ml   FLUID REMOVED: 3750 ML  Tolerated Treatment: Fair  Patient Response to Treatment: fair  Physician Notified: Yes       Provider Notification  Provider Notification  Reason for Communication: Review case (02/22/23 1630)  Provider Name: William Sanz (02/22/23 1630)  Provider Notification: Physician (02/22/23 1630)  Method of Communication: Face to face (02/22/23 1630)  Response: At bedside (02/22/23 1630)  Notification Time: 1615 (02/22/23 1630)  Shift Event: Other (comment) (patient clotted last 10 min of treatment) (02/22/23 1630)     Handoff complete and report given to Primary RN at 1630 hours.   Primary RN (First Initial, Last Name, Title):  Nikky Bevels RN     Education  Person Educated: Patient   Knowledge Base: Substantial  Barriers to Learning?: None  Preferred method of Learning: Oral  Topic(s): Access Care, Signs and Symptoms of Infection, Fluid Management, and Procedural   Teaching Tools: Explanation   Response to Education: Verbalized Understanding Electronically signed by Kelly Buchanan RN on 2/22/2023 at 4:58 PM

## 2023-02-23 ENCOUNTER — TELEPHONE (OUTPATIENT)
Dept: WOUND CARE | Age: 48
End: 2023-02-23

## 2023-02-23 LAB
ALBUMIN SERPL-MCNC: 3.3 GM/DL (ref 3.4–5)
ALP BLD-CCNC: 60 IU/L (ref 40–128)
ALT SERPL-CCNC: 7 U/L (ref 10–40)
ANION GAP SERPL CALCULATED.3IONS-SCNC: 16 MMOL/L (ref 4–16)
AST SERPL-CCNC: 9 IU/L (ref 15–37)
BASOPHILS ABSOLUTE: 0.1 K/CU MM
BASOPHILS RELATIVE PERCENT: 1 % (ref 0–1)
BILIRUB SERPL-MCNC: 0.2 MG/DL (ref 0–1)
BUN SERPL-MCNC: 52 MG/DL (ref 6–23)
CALCIUM SERPL-MCNC: 7.8 MG/DL (ref 8.3–10.6)
CHLORIDE BLD-SCNC: 100 MMOL/L (ref 99–110)
CO2: 23 MMOL/L (ref 21–32)
CREAT SERPL-MCNC: 6.9 MG/DL (ref 0.9–1.3)
DIFFERENTIAL TYPE: ABNORMAL
EOSINOPHILS ABSOLUTE: 0.2 K/CU MM
EOSINOPHILS RELATIVE PERCENT: 3.3 % (ref 0–3)
GFR SERPL CREATININE-BSD FRML MDRD: 9 ML/MIN/1.73M2
GLUCOSE BLD-MCNC: 166 MG/DL (ref 70–99)
GLUCOSE BLD-MCNC: 178 MG/DL (ref 70–99)
GLUCOSE BLD-MCNC: 182 MG/DL (ref 70–99)
GLUCOSE BLD-MCNC: 260 MG/DL (ref 70–99)
GLUCOSE SERPL-MCNC: 219 MG/DL (ref 70–99)
HCT VFR BLD CALC: 24.1 % (ref 42–52)
HEMOGLOBIN: 7.7 GM/DL (ref 13.5–18)
IMMATURE NEUTROPHIL %: 1.5 % (ref 0–0.43)
LV EF: 58 %
LVEF MODALITY: NORMAL
LYMPHOCYTES ABSOLUTE: 0.9 K/CU MM
LYMPHOCYTES RELATIVE PERCENT: 15.1 % (ref 24–44)
MCH RBC QN AUTO: 29.4 PG (ref 27–31)
MCHC RBC AUTO-ENTMCNC: 32 % (ref 32–36)
MCV RBC AUTO: 92 FL (ref 78–100)
MONOCYTES ABSOLUTE: 0.6 K/CU MM
MONOCYTES RELATIVE PERCENT: 9.8 % (ref 0–4)
NUCLEATED RBC %: 0 %
PDW BLD-RTO: 14 % (ref 11.7–14.9)
PLATELET # BLD: 186 K/CU MM (ref 140–440)
PMV BLD AUTO: 10.2 FL (ref 7.5–11.1)
POTASSIUM SERPL-SCNC: 4.5 MMOL/L (ref 3.5–5.1)
RBC # BLD: 2.62 M/CU MM (ref 4.6–6.2)
SEGMENTED NEUTROPHILS ABSOLUTE COUNT: 4.2 K/CU MM
SEGMENTED NEUTROPHILS RELATIVE PERCENT: 69.3 % (ref 36–66)
SODIUM BLD-SCNC: 139 MMOL/L (ref 135–145)
TOTAL IMMATURE NEUTOROPHIL: 0.09 K/CU MM
TOTAL NUCLEATED RBC: 0 K/CU MM
TOTAL PROTEIN: 5.8 GM/DL (ref 6.4–8.2)
WBC # BLD: 6 K/CU MM (ref 4–10.5)

## 2023-02-23 PROCEDURE — 85025 COMPLETE CBC W/AUTO DIFF WBC: CPT

## 2023-02-23 PROCEDURE — 93306 TTE W/DOPPLER COMPLETE: CPT

## 2023-02-23 PROCEDURE — 6360000002 HC RX W HCPCS: Performed by: INTERNAL MEDICINE

## 2023-02-23 PROCEDURE — 6360000002 HC RX W HCPCS: Performed by: STUDENT IN AN ORGANIZED HEALTH CARE EDUCATION/TRAINING PROGRAM

## 2023-02-23 PROCEDURE — 6370000000 HC RX 637 (ALT 250 FOR IP): Performed by: INTERNAL MEDICINE

## 2023-02-23 PROCEDURE — 36415 COLL VENOUS BLD VENIPUNCTURE: CPT

## 2023-02-23 PROCEDURE — 80053 COMPREHEN METABOLIC PANEL: CPT

## 2023-02-23 PROCEDURE — 1200000000 HC SEMI PRIVATE

## 2023-02-23 PROCEDURE — 6370000000 HC RX 637 (ALT 250 FOR IP): Performed by: STUDENT IN AN ORGANIZED HEALTH CARE EDUCATION/TRAINING PROGRAM

## 2023-02-23 PROCEDURE — 90935 HEMODIALYSIS ONE EVALUATION: CPT

## 2023-02-23 PROCEDURE — 2700000000 HC OXYGEN THERAPY PER DAY

## 2023-02-23 PROCEDURE — 94761 N-INVAS EAR/PLS OXIMETRY MLT: CPT

## 2023-02-23 PROCEDURE — 2580000003 HC RX 258: Performed by: STUDENT IN AN ORGANIZED HEALTH CARE EDUCATION/TRAINING PROGRAM

## 2023-02-23 PROCEDURE — 82962 GLUCOSE BLOOD TEST: CPT

## 2023-02-23 RX ORDER — INSULIN GLARGINE 100 [IU]/ML
30 INJECTION, SOLUTION SUBCUTANEOUS NIGHTLY
Status: DISCONTINUED | OUTPATIENT
Start: 2023-02-23 | End: 2023-02-24 | Stop reason: HOSPADM

## 2023-02-23 RX ORDER — HEPARIN SODIUM 1000 [USP'U]/ML
2500 INJECTION, SOLUTION INTRAVENOUS; SUBCUTANEOUS
Status: COMPLETED | OUTPATIENT
Start: 2023-02-23 | End: 2023-02-23

## 2023-02-23 RX ADMIN — ARIPIPRAZOLE 5 MG: 5 TABLET ORAL at 20:45

## 2023-02-23 RX ADMIN — ASPIRIN 81 MG: 81 TABLET, COATED ORAL at 15:36

## 2023-02-23 RX ADMIN — INSULIN LISPRO 10 UNITS: 100 INJECTION, SOLUTION INTRAVENOUS; SUBCUTANEOUS at 17:15

## 2023-02-23 RX ADMIN — SODIUM CHLORIDE, PRESERVATIVE FREE 10 ML: 5 INJECTION INTRAVENOUS at 15:39

## 2023-02-23 RX ADMIN — HEPARIN SODIUM 5000 UNITS: 5000 INJECTION INTRAVENOUS; SUBCUTANEOUS at 05:41

## 2023-02-23 RX ADMIN — EPOETIN ALFA-EPBX 10000 UNITS: 10000 INJECTION, SOLUTION INTRAVENOUS; SUBCUTANEOUS at 09:51

## 2023-02-23 RX ADMIN — CALCITRIOL 0.5 MCG: 0.5 CAPSULE, LIQUID FILLED ORAL at 20:45

## 2023-02-23 RX ADMIN — CLOPIDOGREL BISULFATE 75 MG: 75 TABLET ORAL at 15:36

## 2023-02-23 RX ADMIN — INSULIN GLARGINE 30 UNITS: 100 INJECTION, SOLUTION SUBCUTANEOUS at 20:45

## 2023-02-23 RX ADMIN — LISINOPRIL 2.5 MG: 5 TABLET ORAL at 15:37

## 2023-02-23 RX ADMIN — CARVEDILOL 25 MG: 25 TABLET, FILM COATED ORAL at 17:15

## 2023-02-23 RX ADMIN — OXYCODONE AND ACETAMINOPHEN 1 TABLET: 7.5; 325 TABLET ORAL at 23:05

## 2023-02-23 RX ADMIN — HEPARIN SODIUM 5000 UNITS: 5000 INJECTION INTRAVENOUS; SUBCUTANEOUS at 20:44

## 2023-02-23 RX ADMIN — HEPARIN SODIUM 5000 UNITS: 5000 INJECTION INTRAVENOUS; SUBCUTANEOUS at 15:37

## 2023-02-23 RX ADMIN — INSULIN LISPRO 2 UNITS: 100 INJECTION, SOLUTION INTRAVENOUS; SUBCUTANEOUS at 17:16

## 2023-02-23 RX ADMIN — HEPARIN SODIUM 2500 UNITS: 1000 INJECTION INTRAVENOUS; SUBCUTANEOUS at 09:50

## 2023-02-23 RX ADMIN — OXYCODONE AND ACETAMINOPHEN 1 TABLET: 7.5; 325 TABLET ORAL at 15:37

## 2023-02-23 ASSESSMENT — PAIN DESCRIPTION - DESCRIPTORS: DESCRIPTORS: THROBBING

## 2023-02-23 ASSESSMENT — PAIN SCALES - WONG BAKER: WONGBAKER_NUMERICALRESPONSE: 0

## 2023-02-23 ASSESSMENT — PAIN SCALES - GENERAL
PAINLEVEL_OUTOF10: 10
PAINLEVEL_OUTOF10: 7
PAINLEVEL_OUTOF10: 3

## 2023-02-23 ASSESSMENT — PAIN DESCRIPTION - LOCATION: LOCATION: BACK;FOOT

## 2023-02-23 ASSESSMENT — PAIN DESCRIPTION - ORIENTATION: ORIENTATION: LEFT

## 2023-02-23 NOTE — PROGRESS NOTES
Removed 3.5 L       Patient Name: Augustin Scott  Patient : 1975  MRN: [id number]     Acct: [de-identified]  Date of Admission: 2023  Room/Bed: 3116/3116-A  Code Status:  Full Code  Allergies:    Allergies   Allergen Reactions    Adhesive Tape Rash    Doxycycline Nausea And Vomiting    Reglan [Metoclopramide] Anxiety     Diagnosis:    Patient Active Problem List   Diagnosis    Hiatal hernia    Diabetes mellitus type 2, improved controlled    Diabetic neuropathy (HCC)    Panic attack    Anxiety associated with depression    Neck pain    DANIELA (obstructive sleep apnea)    Urinary frequency    Bilateral carpal tunnel syndrome- left worse than right    Hyperlipidemia with target LDL less than 100    Essential hypertension    Bronchitis    Osteomyelitis (HCC)    Abscess    Periumbilical hernia    Sepsis (HCC)    Cellulitis of left foot    Constipation    Intractable nausea and vomiting    Uncontrolled type 2 diabetes mellitus    Nausea and vomiting    Diabetic foot infection (HCC)    Acute renal failure (ARF) (HCC)    Cellulitis    Cellulitis of left arm    Cellulitis, scrotum    Acute epididymitis    Irene gangrene    Recurrent major depressive disorder, in full remission ([address]    Generalized anxiety disorder    Morbid obesity with body mass index (BMI) of 40.0 to 44.9 in [facility])    Necrotizing fasciitis (HCC)    Syncope    Right groin wound    Ulcer of right groin with fat layer exposed ([address]    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 type 2 diabetes mellitus, with muscle involvement without evidence of necrosis (HCC)    Nephrotic syndrome    Generalized edema    Stage 3b chronic kidney disease (HCC)    Diabetic nephropathy associated with type 2 diabetes mellitus (HCC)    Hypoalbuminemia    Chronic kidney disease, stage IV (severe) (HCC)    FH: CAD (coronary artery disease)    ASCVD (arteriosclerotic cardiovascular disease)    Other proteinuria    Chest pain    Surgical wound dehiscence    CARMEN (acute kidney injury) ([facility])    Anemia    Chest tightness    NSTEMI (non-ST elevated myocardial infarction) ([facility])    WD - Ulcer of forefoot due to type 2 diabetes mellitus ([facility])    ESRD (end stage renal disease) ([facility]    Problem with vascular access    Fluid overload    Acute on chronic respiratory failure with hypoxia ([facility])    CAD (coronary artery disease)    Volume overload         Treatment:  Hemodilaysis 2:1  Priority: Routine  Location: Acute Room    Diabetic: Yes  NPO: No  Isolation Precautions: Dialysis     Consent for Treatment Verified: Yes  Blood Consent Verified: Not Applicable     Safety Verified: Identify (I), Consent (C), Equipment (E), HepB Status (B), Orders Complete (O), Access Verified (A), and Timeliness (T)  Time out performed prior to access at 0840 hours. Report Received from Primary RN at 0750 hours. Primary RN (First Initial, Last Name, Title): /pedro Williamson RN  Incapacitated Nurse Education Completed: Not Applicable     HBsAg ONLY:  Date Drawn: February 13, 2023       Results: Negative  HBsAb:  Date Drawn:  February 13, 2023       Results: Unknown    Order  Dialysis Bath  K+ (Potassium): 3  Ca+ (Calcium):  (3.5)  Na+ (Sodium): 137  HCO3 (Bicarb): 35  Bicarbonate Concentrate Lot No.: [id number]  Acid Concentrate Lot No.: [id number]     Na+ Modeling: Not Applicable  Dialyzer: U337  Dialysate Temperature (C):  36  Blood Flow Rate (BFR):  300   Dialysate Flow Rate (DFR):   700        Access to be Utilized   Access:  Tunneled Catheter  Location: Internal Jugular  Side: Right   Needle gauge:  Not Applicable  + Bruit/Thrill: Not Applicable    First Use X-ray Verified: Not Applicable  OK to use line order: Not Applicable    Site Assessment:  Signs and Symptoms of Infection/Inflammation: None  If yes: Not Applicable  Dressing: Dry and Intact  Site Prep: Medical Aseptic Technique  Dressing Changed this Treatment: No  If yes, by whom: NA - not changed today  Date of Last Dressing Change: February 22, 2023  Antimicrobial Patch in place?: Yes  Red Alcohol Caps in place?: Yes  Gauze Dressing?: No  Non Dialysis Use?: No  Comment:    Flows: Good, Patent  If access problem, who was notified:     Pre and Post-Assessment  Patient Vitals for the past 8 hrs:   Level of Consciousness Oriented X Heart Rhythm Respiratory Quality/Effort O2 Device Bilateral Breath Sounds Skin Color Skin Condition/Temp Abdomen Inspection Bowel Sounds (All Quadrants) Edema Edema Generalized   02/23/23 0700 0 -- -- -- Nasal cannula -- -- -- -- -- -- --   02/23/23 0715 0 -- -- Unlabored -- -- Pink Dry;Warm Obese; Soft -- Generalized Non-pitting   02/23/23 0830 0 4 Regular Unlabored Nasal cannula Diminished Pink Dry;Warm Obese; Soft Active Generalized Non-pitting   02/23/23 1300 0 4 Regular Unlabored Nasal cannula Diminished Pink Dry;Warm Obese; Soft Active Generalized Non-pitting     Labs  Recent Labs     02/22/23  0825 02/23/23  0446   WBC 7.4 6.0   HGB 8.9* 7.7*   HCT 27.7* 24.1*    186                                                                  Recent Labs     02/22/23  0825 02/23/23  0446    139   K 4.4 4.5   CL 96* 100   CO2 24 23   BUN 70* 52*   CREATININE 8.6* 6.9*   GLUCOSE 192* 219*     IV Drips and Rate/Dose   sodium chloride      dextrose        Safety - Before each treatment:   Dialysis Machine No.: [id number]   Machine Number: [id number]  Dialyzer Lot No.: [id number]  RO Machine Log Sheet Completed: Yes  Machine Alarm Self Test: Completed; Passed (02/23/23 0830)     Air Foam Detector: Tested, Proper Function, pH Reading  Extracorporeal Circuit Tested for Integrity: Yes  Machine Conductivity: 14.0  Manual Conductivity: 14     Bicarbonate Concentrate Lot No.: [id number]  Acid Concentrate Lot No.: [id number]  Manual Ph: 7.4  Bleach Test (Neg): Yes  Bath Temperature: 96.8 °F (36 °C)  Tubing Lot#: [id number]  Conductivity Meter Serial #: [id number]  All Connections Secure?: Yes  Venous Parameters Set?: Yes  Arterial Parameters Set?: Yes  Saline Line Double Clamped?: Yes  Air Foam Detector Engaged?: Yes  Machine Functioning Alarm Free?  Yes  Prime Given: 200ml    Chlorine Testing - Before each treatment and every 4 hours:   Treatment  Time On: 0848  Time Off: 1253  Weight: 281 lb 8.4 oz (127.7 kg) (02/23/23 0830)  1st check: less than 0.1 ppm at: 0650 hours  2nd check: less than 0.1 ppm at: 1030 hours  3rd check: Not Applicable  (if greater than 0.1 ppm, then check every 30 minutes from secondary)    Access Flows and Pressures  Patient Vitals for the past 8 hrs:   Blood Flow Rate (ml/min) Ultrafiltration Rate (ml/hr) Arterial Pressure (mmHg) Venous Pressure (mmHg) TMP DFR Comments Access Visible   02/23/23 0848 300 ml/min 1130 ml/hr -60 mmHg 80 50 700 tx initiated Yes   02/23/23 0900 300 ml/min 1130 ml/hr -70 mmHg 80 50 700 lines secure Yes   02/23/23 0915 300 ml/min 4460 ml/hr -70 mmHg 80 50 700 resting Yes   02/23/23 0930 300 ml/min 1130 ml/hr -80 mmHg 80 50 700 no distress Yes   02/23/23 0945 300 ml/min 1130 ml/hr -90 mmHg 80 50 700 snoring Yes   02/23/23 1000 300 ml/min 1130 ml/hr -90 mmHg 90 60 700 resting Yes   02/23/23 1015 300 ml/min 1130 ml/hr -70 mmHg 90 60 700 no complaints Yes   02/23/23 1030 300 ml/min 1130 ml/hr -70 mmHg 90 50 700 resting Yes   02/23/23 1045 300 ml/min 880 ml/hr -70 mmHg 100 50 700 ufg reduced Yes   02/23/23 1100 300 ml/min 880 ml/hr -80 mmHg 90 50 700 no changed Yes   02/23/23 1115 300 ml/min 880 ml/hr -90 mmHg 90 50 700 snoring Yes   02/23/23 1130 300 ml/min 880 ml/hr -90 mmHg 90 50 700 resting Yes   02/23/23 1145 300 ml/min 880 ml/hr -80 mmHg 90 60 700 no distress Yes   02/23/23 1200 300 ml/min 880 ml/hr -80 mmHg 100 50 700 on phone Yes   02/23/23 1215 300 ml/min 880 ml/hr -80 mmHg 100 50 700 resting Yes   02/23/23 1230 300 ml/min 880 ml/hr -80 mmHg 90 50 700 no change Yes   02/23/23 1245 300 ml/min 880 ml/hr -80 mmHg 90 50 700 resting Yes   02/23/23 1253 -- -- -- -- -- -- tx ended Yes     Vital Signs  Patient Vitals for the past 8 hrs:   BP Temp Pulse Resp SpO2 Weight Weight Method Percent Weight Change   02/23/23 0539 -- -- -- -- -- 272 lb 14.9 oz (123.8 kg) Bed scale 0   02/23/23 0700 130/71 98 °F (36.7 °C) 81 24 97 % -- -- --   02/23/23 0830 (!) 161/85 98.3 °F (36.8 °C) 85 24 97 % 281 lb 8.4 oz (127.7 kg) -- 3.15   02/23/23 0848 129/75 -- 82 14 -- -- -- --   02/23/23 0900 (!) 171/96 -- 83 14 -- -- -- --   02/23/23 0915 (!) 172/98 -- 79 11 -- -- -- --   02/23/23 0930 (!) 177/95 -- 83 13 -- -- -- --   02/23/23 0945 134/82 -- 84 14 -- -- -- --   02/23/23 1000 118/81 -- 83 13 -- -- -- --   02/23/23 1015 128/74 -- 80 14 -- -- -- --   02/23/23 1030 112/60 -- 77 14 -- -- -- --   02/23/23 1045 91/64 -- 84 19 -- -- -- --   02/23/23 1100 117/78 -- 80 16 -- -- -- --   02/23/23 1115 125/89 -- 80 15 -- -- -- --   02/23/23 1130 (!) 149/96 -- 81 14 -- -- -- --   02/23/23 1145 128/81 -- 80 14 -- -- -- --   02/23/23 1200 128/88 -- 81 15 -- -- -- --   02/23/23 1215 (!) 147/84 -- 76 14 -- -- -- --   02/23/23 1230 (!) 168/95 -- 75 13 -- -- -- --   02/23/23 1245 (!) 158/99 -- 80 21 -- -- -- --   02/23/23 1253 (!) 158/99 -- 78 17 -- -- -- --   02/23/23 1300 (!) 149/82 98.2 °F (36.8 °C) 82 14 98 % -- -- --     Post-Dialysis  Arterial Catheter Locking Solution:  NS  Venous Catheter Locking Solution:  NS  Post-Treatment Procedures: Blood returned, Catheter Capped, clamped with Saline x2 ports  Machine Disinfection Process: Exterior Machine Disinfection  Rinseback Volume (ml): 300 ml  Blood Volume Processed (Liters): 70.2 l/min  Dialyzer Clearance: Heavily streaked  Duration of Treatment (minutes): 240 minutes     Hemodialysis Intake (ml): 500 ml  Hemodialysis Output (ml): 4000 ml     Tolerated Treatment: Good  Patient Response to Treatment: good  Physician Notified: Yes       Provider Notification  Provider Notification  Reason for Communication: Review case (02/22/23 7260)  Provider Name: Stella Long (02/22/23 9660)  Provider Notification: Physician (02/22/23 1630)  Method of Communication: Face to face (02/22/23 1630)  Response: At bedside (02/22/23 1630)  Notification Time: 1615 (02/22/23 1630)  Shift Event: Other (comment) (patient clotted last 10 min of treatment) (02/22/23 1630)     Handoff complete and report given to Primary RN at 1314 hours.   Primary RN (First Initial, Last Name, Title):  Tally Age RN     Education  Person Educated: Patient   Knowledge Base: Substantial  Barriers to Learning?: None  Preferred method of Learning: Oral  Topic(s): Access Care, Signs and Symptoms of Infection, Fluid Management, and Procedural   Teaching Tools: Explanation   Response to Education: Verbalized Understanding     Electronically signed by Alfa Ybarra RN on 2/23/2023 at 1:15 PM

## 2023-02-23 NOTE — CARE COORDINATION
.Case Management Assessment  Initial Evaluation    Date/Time of Evaluation: 2/23/2023 3:04 PM  Assessment Completed by: Shree Menjivar RN    If patient is discharged prior to next notation, then this note serves as note for discharge by case management. Patient Name: Rigoberto Avendano                   YOB: 1975  Diagnosis: Hypocalcemia [E83.51]  Acute pulmonary edema ([facility] [J81.0]  ESRD (end stage renal disease) ([facility] [N18.6]  Volume overload [E87.70]  Acute respiratory failure with hypoxia ([facility] [J96.01]                   Date / Time: 2/22/2023  8:03 AM    Patient Admission Status: Inpatient   Readmission Risk (Low < 19, Mod (19-27), High > 27): Readmission Risk Score: 27.6    Current PCP: Jewel Mayfield MD  PCP verified by CM? Yes    Chart Reviewed: Yes      History Provided by: Patient  Patient Orientation: Alert and Oriented    Patient Cognition: Alert    Hospitalization in the last 30 days (Readmission):  Yes    If yes, Readmission Assessment in  Navigator will be completed. Advance Directives:      Code Status: Full Code   Patient's Primary Decision Maker is:  (SELF)    Primary Decision MakerAdipika Pineda Spouse - [phone]    Discharge Planning:    Patient lives with: Spouse/Significant Other Type of Home: House  Primary Care Giver: Self  Patient Support Systems include: Spouse/Significant Other   Current Financial resources: Medicare  Current community resources: ECF/Home Care  Current services prior to admission: Oxygen Therapy            Current DME:              Type of Home Care services:  None    ADLS  Prior functional level: Independent in ADLs/IADLs  Current functional level: Independent in ADLs/IADLs    PT AM-PAC:   /24  OT AM-PAC:   /24    Family can provide assistance at DC: Yes  Would you like Case Management to discuss the discharge plan with any other family members/significant others, and if so, who?  No  Plans to Return to Present Housing: Yes  Other Identified Issues/Barriers to RETURNING to current housing: NONE  Potential Assistance needed at discharge: N/A            Potential DME:    Patient expects to discharge to: [facility] for transportation at discharge:      Financial    Payor: Ana Montgomery / Plan: Raiza Breaker / Product Type: *No Product type* /     Does insurance require precert for SNF: Yes    Potential assistance Purchasing Medications:    [facility] [id number] [facility], [address] [facility] [address] [phone]  [address] [facility]  Phone: [phone] Fax: [phone]    [address] [facility], [address] [facility]  [address] [facility] [address] [facility]  Phone: [phone] Fax: [phone]    [address] [facility], [address] [facility] [address] [address]  Phone: [phone] Fax: [phone]      Notes:    Factors facilitating achievement of predicted outcomes: Family support, Cooperative, Pleasant, Good insight into deficits, and Has needed Durable Medical Equipment at home    Barriers to discharge: Medical complications    Additional Case Management Notes: . CM met with pt for d/c planning. Introduced self and updated white board. Pt lives with spouse and is independent with ADL's. [employer] provide his transportation. He has a PCP, has insurance, and is able to afford his medication. Pt receives dialysis M-W-F at [facility] on [address] [facility]. [facility] offered and pt is agreeable. Pt has requested [facility] d/t he has had them before. Pt denies any other d/c needs at this time. D/c plan is home with spouse, children and [facility]. Referral made to [facility]. Notify CM if any new d/c needs arise.   TE    The Plan for Transition of Care is related to the following treatment goals of Hypocalcemia [E83.51]  Acute pulmonary edema (HCC) [J81.0]  ESRD (end stage renal disease) ([facility] [N18.6]  Volume overload [E87.70]  Acute respiratory failure with hypoxia ([facility] [K16.92]    IF APPLICABLE: The Patient and/or patient representative Emilee Smith and his family were provided with a choice of provider and agrees with the discharge plan. Freedom of choice list with basic dialogue that supports the patient's individualized plan of care/goals and shares the quality data associated with the providers was provided to:     Patient Representative Name:       The Patient and/or Patient Representative Agree with the Discharge Plan?       Merrill Cabrera RN  Case Management Department

## 2023-02-23 NOTE — PROGRESS NOTES
Nephrology Progress Note        [address], [address] [address]  Phone: [phone]  Office Hours: 8:30AM - 4:30PM  Monday - Friday 2/23/2023 8:05 AM  Subjective:   Admit Date: 2/22/2023  PCP: Lizbeth Carranza MD  Interval History: On nc  Eating breakfast  I see a pizza box in his room , that he had last night    Diet: ADULT DIET; Regular; 4 carb choices (60 gm/meal); Low Fat/Low Chol/High Fiber/2 gm Na; 1800 ml      Data:   Scheduled Meds:   epoetin paola-epbx  10,000 Units IntraVENous Once in dialysis    heparin (porcine)  2,500 Units IntraCATHeter Once in dialysis    sodium chloride flush  5-40 mL IntraVENous 2 times per day    calcitRIOL  0.5 mcg Oral BID    heparin (porcine)  5,000 Units SubCUTAneous 3 times per day    insulin lispro  10 Units SubCUTAneous TID WC    insulin lispro  0-4 Units SubCUTAneous TID WC    insulin lispro  0-4 Units SubCUTAneous Nightly    insulin glargine  25 Units SubCUTAneous Nightly    ARIPiprazole  5 mg Oral Nightly    aspirin  81 mg Oral Daily    carvedilol  25 mg Oral BID WC    clopidogrel  75 mg Oral Daily    lisinopril  2.5 mg Oral Daily     Continuous Infusions:   sodium chloride      dextrose       PRN Meds:sodium chloride, ondansetron **OR** ondansetron, polyethylene glycol, acetaminophen **OR** acetaminophen, glucose, dextrose bolus **OR** dextrose bolus, glucagon (rDNA), dextrose, oxyCODONE-acetaminophen  I/O last 3 completed shifts: In: 2000 [I.V.:1500]  Out: 4470 [Urine:220]  No intake/output data recorded.     Intake/Output Summary (Last 24 hours) at 2/23/2023 0805  Last data filed at 2/22/2023 2052  Gross per 24 hour   Intake 2000 ml   Output 4470 ml   Net -2470 ml       CBC:   Recent Labs     02/22/23  0825 02/23/23  0446   WBC 7.4 6.0   HGB 8.9* 7.7*    186       BMP:    Recent Labs     02/22/23  0825      K 4.4   CL 96*   CO2 24   BUN 70*   CREATININE 8.6*   GLUCOSE 192*     Hepatic:   Recent Labs     02/22/23  0808 AST 12*   ALT 9*   BILITOT 0.2   ALKPHOS 68     Troponin: No results for input(s): TROPONINI in the last 72 hours. BNP: No results for input(s): BNP in the last 72 hours. Lipids: No results for input(s): CHOL, HDL in the last 72 hours. Invalid input(s): LDLCALCU  ABGs:   Lab Results   Component Value Date/Time    PO2ART 119 06/18/2013 01:15 PM    FQI0OCF 47.0 06/18/2013 01:15 PM     INR: No results for input(s): INR in the last 72 hours.     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

## 2023-02-23 NOTE — CARE COORDINATION
CM met with pt for d/c planning. Introduced self and updated white board. Pt lives with spouse and is independent with ADL's. [employer] provide his transportation. He has a PCP, has insurance, and is able to afford his medication. Pt receives dialysis M[facility]F [facility] [address] [facility]. [facility] offered and pt is agreeable. Pt has requested [facility] d/t he has had them before. Pt denies any other d/c needs at this time. D/c plan is home with spouse, children and [facility]. Referral made to [facility]. Notify CM if any new d/c needs arise. TE     At NM, please call [facility] to notify them of dc at [phone] or [phone]. Please fax AVS, facesheet, and [facility] order to [phone].

## 2023-02-23 NOTE — PROGRESS NOTES
V2.0  [facility] Hospitalist Progress Note      Name:  Alondra Mckeon /Age/Sex: 1975  (52 y.o. male)   MRN & CSN:  [id number] & [id number] Encounter Date/Time: 2023 4:18 PM EST    Location:  [address] [place] [address] PCP: Pam Dow MD       Hospital Day: 2    Assessment and Plan:   Alondra Mckeon is a 52 y.o. male with pmh of ESRD who presents with volume overload    #Acute on chronic hypoxic respiratory failure in setting of worsening volume overload in the setting of ESRD  --Was treated with BiPAP upon admission, now improved  -At home he is currently only wearing oxygen at night-3 L     #ESRD on hemodialysis  --Patient is on dialysis , reports that he made it to dialysis on Monday but developed worsening shortness of breath into this morning. --Received Lasix in the ED  --Continue hemodialysis for volume removal per nephrology     #Hypocalcemia  --Patient appears to be chronically hypocalcemic, presented with a calcium of 6.7.   Repleted in the ED  --Trend BMP and replete calcium as necessary  --Restarted on daily calcitriol     #CAD  --History of CAD status post PCI with stents to the LAD and left circumflex  --Continue the aspirin, plavix, and BB      #Elevated BNP  --BNP elevated to 23,000  --Follow-up 2D echo, last echo is from  showed an EF of 50 to 55% with normal LV SF  ---2D echo shows an EF 55 to 60% this admission    #Hypertension  --Patient hypertensive on presentation, home regimen is lisinopril and carvedilol  --Restarted his home regimen, monitor his trends and adjust therapy as necessary     #Type 2 diabetes on long-term insulin  --Patient on bolus regimen of Lantus and Humalog  --Upon admission he was started on 25 units of Lantus nightly which is about half his home dose and 10 of Humalog which is half of his bolus premeal  --Continue Accu-Cheks ACHS and cover with sliding scale insulin  --Last hemoglobin A1c was 5.7% in September  --blood sugar this morning was 219-we will increase Lantus from 25 to 30 units HS     #Hyperlipidemia  --Continue statin     #Depression  --Continue Abilify      #Insomnia  --Previously on trazodone, restart as needed     #Chronic pain  --Patient on Percocet at home, will restart as needed     #Morbid obesity  --BMI 42.38, discussed lifestyle modifications including diet and lifestyle changes       Nocturnal hypoxia-he reports that he wears 3 L oxygen at night, and no oxygen during the day    Concern for sleep apnea  -2/23-BiPAP machine was in room when I saw him, but he was not needing it. He stated he wished he had one at home.  -An ApneaLink test was done on him in June 2013 when he was in the hospital, and it showed that his AHI is 14. I do not believe he has had a sleep study  -He is interested in seeing a pulmonologist to see if he can qualify for PAP therapy at home  -We will consult pulmonary    Diet ADULT DIET; Regular; 4 carb choices (60 gm/meal); Low Fat/Low Chol/High Fiber/2 gm Na; 1800 ml   DVT Prophylaxis [] Lovenox, [x]  Heparin, [] SCDs, [] Ambulation,  [] Eliquis, [] Xarelto  [] Coumadin   Code Status Full Code   Disposition From: Home  Expected Disposition: Home  Estimated Date of Discharge: about 1 day  Patient requires continued admission due to still being volume overloaded, and needs more fluid removal via hemodialysis   Surrogate Decision Maker/ POA His wife Jade Pearl is listed as the alternate contact in the chart     Subjective:     Chief Complaint: Shortness of Breath       Joseluis Ibarra is a 52 y.o. male who presents with volume overload    February 23-he was feeling better when I saw him today         Review of Systems:    Review of Systems    No vomiting or bleeding reported    Objective:      Intake/Output Summary (Last 24 hours) at 2/23/2023 1618  Last data filed at 2/23/2023 1539  Gross per 24 hour   Intake 2750 ml   Output 8470 ml   Net -5720 ml        Vitals:   Vitals:    02/23/23 1537   BP: 130/73   Pulse:    Resp: 16   Temp:    SpO2:        Physical Exam:       General: NAD  Eyes: EOMI  Neuro: Alert. Psych: Mood appropriate.      Medications:   Medications:    sodium chloride flush  5-40 mL IntraVENous 2 times per day    calcitRIOL  0.5 mcg Oral BID    heparin (porcine)  5,000 Units SubCUTAneous 3 times per day    insulin lispro  10 Units SubCUTAneous TID WC    insulin lispro  0-4 Units SubCUTAneous TID WC    insulin lispro  0-4 Units SubCUTAneous Nightly    insulin glargine  25 Units SubCUTAneous Nightly    ARIPiprazole  5 mg Oral Nightly    aspirin  81 mg Oral Daily    carvedilol  25 mg Oral BID WC    clopidogrel  75 mg Oral Daily    lisinopril  2.5 mg Oral Daily      Infusions:    sodium chloride      dextrose       PRN Meds: sodium chloride, , PRN  ondansetron, 4 mg, Q8H PRN   Or  ondansetron, 4 mg, Q6H PRN  polyethylene glycol, 17 g, Daily PRN  acetaminophen, 650 mg, Q6H PRN   Or  acetaminophen, 650 mg, Q6H PRN  glucose, 4 tablet, PRN  dextrose bolus, 125 mL, PRN   Or  dextrose bolus, 250 mL, PRN  glucagon (rDNA), 1 mg, PRN  dextrose, , Continuous PRN  oxyCODONE-acetaminophen, 1 tablet, Q8H PRN        Labs      Recent Results (from the past 24 hour(s))   POCT Glucose    Collection Time: 02/22/23  6:17 PM   Result Value Ref Range    POC Glucose 135 (H) 70 - 99 MG/DL   POCT Glucose    Collection Time: 02/22/23  8:34 PM   Result Value Ref Range    POC Glucose 203 (H) 70 - 99 MG/DL   Hepatitis B Surface Antibody    Collection Time: 02/22/23  8:35 PM   Result Value Ref Range    Hep B S Ab 4.07    Hepatitis B Surface Antigen    Collection Time: 02/22/23  8:35 PM   Result Value Ref Range    Hepatitis B Surface Ag NON REACTIVE NON REACTIVE   Comprehensive Metabolic Panel w/ Reflex to MG    Collection Time: 02/23/23  4:46 AM   Result Value Ref Range    Sodium 139 135 - 145 MMOL/L    Potassium 4.5 3.5 - 5.1 MMOL/L    Chloride 100 99 - 110 mMol/L    CO2 23 21 - 32 MMOL/L    BUN 52 (H) 6 - 23 MG/DL Creatinine 6.9 (H) 0.9 - 1.3 MG/DL    Est, Glom Filt Rate 9 (L) >60 mL/min/1.73m2    Glucose 219 (H) 70 - 99 MG/DL    Calcium 7.8 (L) 8.3 - 10.6 MG/DL    Albumin 3.3 (L) 3.4 - 5.0 GM/DL    Total Protein 5.8 (L) 6.4 - 8.2 GM/DL    Total Bilirubin 0.2 0.0 - 1.0 MG/DL    ALT 7 (L) 10 - 40 U/L    AST 9 (L) 15 - 37 IU/L    Alkaline Phosphatase 60 40 - 128 IU/L    Anion Gap 16 4 - 16   CBC with Auto Differential    Collection Time: 02/23/23  4:46 AM   Result Value Ref Range    WBC 6.0 4.0 - 10.5 K/CU MM    RBC 2.62 (L) 4.6 - 6.2 M/CU MM    Hemoglobin 7.7 (L) 13.5 - 18.0 GM/DL    Hematocrit 24.1 (L) 42 - 52 %    MCV 92.0 78 - 100 FL    MCH 29.4 27 - 31 PG    MCHC 32.0 32.0 - 36.0 %    RDW 14.0 11.7 - 14.9 %    Platelets 974 338 - 014 K/CU MM    MPV 10.2 7.5 - 11.1 FL    Differential Type AUTOMATED DIFFERENTIAL     Segs Relative 69.3 (H) 36 - 66 %    Lymphocytes % 15.1 (L) 24 - 44 %    Monocytes % 9.8 (H) 0 - 4 %    Eosinophils % 3.3 (H) 0 - 3 %    Basophils % 1.0 0 - 1 %    Segs Absolute 4.2 K/CU MM    Lymphocytes Absolute 0.9 K/CU MM    Monocytes Absolute 0.6 K/CU MM    Eosinophils Absolute 0.2 K/CU MM    Basophils Absolute 0.1 K/CU MM    Nucleated RBC % 0.0 %    Total Nucleated RBC 0.0 K/CU MM    Total Immature Neutrophil 0.09 K/CU MM    Immature Neutrophil % 1.5 (H) 0 - 0.43 %   POCT Glucose    Collection Time: 02/23/23  7:00 AM   Result Value Ref Range    POC Glucose 182 (H) 70 - 99 MG/DL   POCT Glucose    Collection Time: 02/23/23  1:57 PM   Result Value Ref Range    POC Glucose 166 (H) 70 - 99 MG/DL        Imaging/Diagnostics Last 24 Hours   XR CHEST PORTABLE    Result Date: 2/23/2023  EXAMINATION: ONE XRAY VIEW OF THE CHEST 2/22/2023 8:14 am COMPARISON: 02/07/2023 HISTORY: ORDERING SYSTEM PROVIDED HISTORY: Shortness of Breath TECHNOLOGIST PROVIDED HISTORY: Reason for exam:->Shortness of Breath Reason for Exam: sob FINDINGS: The heart is enlarged but is similar in size to the prior study.   No definite pleural effusion or pneumothorax is seen. Increased interstitial opacities bilaterally. Similar appearance of the right-sided CVC. Increased interstitial opacities bilaterally, suspicious for pulmonary edema. Infection can also be considered. Similar cardiomegaly.        Electronically signed by Tia Alvarado MD on 2/23/2023 at 4:18 PM

## 2023-02-23 NOTE — CARE COORDINATION
Attempted to see pt for d/c planning. Pt is in Dialysis. CM will attempt to see pt when he returns to room.   TE

## 2023-02-24 VITALS
SYSTOLIC BLOOD PRESSURE: 127 MMHG | RESPIRATION RATE: 18 BRPM | HEART RATE: 80 BPM | OXYGEN SATURATION: 97 % | HEIGHT: 69 IN | TEMPERATURE: 97.9 F | BODY MASS INDEX: 39.8 KG/M2 | WEIGHT: 268.74 LBS | DIASTOLIC BLOOD PRESSURE: 67 MMHG

## 2023-02-24 LAB
ANION GAP SERPL CALCULATED.3IONS-SCNC: 14 MMOL/L (ref 4–16)
BUN SERPL-MCNC: 49 MG/DL (ref 6–23)
CALCIUM SERPL-MCNC: 8.2 MG/DL (ref 8.3–10.6)
CHLORIDE BLD-SCNC: 100 MMOL/L (ref 99–110)
CO2: 24 MMOL/L (ref 21–32)
CREAT SERPL-MCNC: 5.8 MG/DL (ref 0.9–1.3)
GFR SERPL CREATININE-BSD FRML MDRD: 11 ML/MIN/1.73M2
GLUCOSE BLD-MCNC: 171 MG/DL (ref 70–99)
GLUCOSE BLD-MCNC: 175 MG/DL (ref 70–99)
GLUCOSE BLD-MCNC: 197 MG/DL (ref 70–99)
GLUCOSE SERPL-MCNC: 204 MG/DL (ref 70–99)
HCT VFR BLD CALC: 29.3 % (ref 42–52)
HEMOGLOBIN: 8.5 GM/DL (ref 13.5–18)
MCH RBC QN AUTO: 29.7 PG (ref 27–31)
MCHC RBC AUTO-ENTMCNC: 29 % (ref 32–36)
MCV RBC AUTO: 102.4 FL (ref 78–100)
PDW BLD-RTO: 13.8 % (ref 11.7–14.9)
PLATELET # BLD: 84 K/CU MM (ref 140–440)
PMV BLD AUTO: 11.1 FL (ref 7.5–11.1)
POTASSIUM SERPL-SCNC: 4.7 MMOL/L (ref 3.5–5.1)
RBC # BLD: 2.86 M/CU MM (ref 4.6–6.2)
SODIUM BLD-SCNC: 138 MMOL/L (ref 135–145)
WBC # BLD: 6.4 K/CU MM (ref 4–10.5)

## 2023-02-24 PROCEDURE — 2700000000 HC OXYGEN THERAPY PER DAY

## 2023-02-24 PROCEDURE — 94660 CPAP INITIATION&MGMT: CPT

## 2023-02-24 PROCEDURE — 82962 GLUCOSE BLOOD TEST: CPT

## 2023-02-24 PROCEDURE — 6360000002 HC RX W HCPCS: Performed by: STUDENT IN AN ORGANIZED HEALTH CARE EDUCATION/TRAINING PROGRAM

## 2023-02-24 PROCEDURE — 80048 BASIC METABOLIC PNL TOTAL CA: CPT

## 2023-02-24 PROCEDURE — 85027 COMPLETE CBC AUTOMATED: CPT

## 2023-02-24 PROCEDURE — 6370000000 HC RX 637 (ALT 250 FOR IP): Performed by: INTERNAL MEDICINE

## 2023-02-24 PROCEDURE — 90935 HEMODIALYSIS ONE EVALUATION: CPT

## 2023-02-24 PROCEDURE — 6360000002 HC RX W HCPCS: Performed by: INTERNAL MEDICINE

## 2023-02-24 PROCEDURE — 6370000000 HC RX 637 (ALT 250 FOR IP): Performed by: STUDENT IN AN ORGANIZED HEALTH CARE EDUCATION/TRAINING PROGRAM

## 2023-02-24 PROCEDURE — 36415 COLL VENOUS BLD VENIPUNCTURE: CPT

## 2023-02-24 PROCEDURE — 94761 N-INVAS EAR/PLS OXIMETRY MLT: CPT

## 2023-02-24 RX ORDER — CALCITRIOL 0.5 UG/1
0.5 CAPSULE, LIQUID FILLED ORAL DAILY
Qty: 14 CAPSULE | Refills: 3 | Status: ON HOLD | OUTPATIENT
Start: 2023-02-24

## 2023-02-24 RX ORDER — INSULIN LISPRO 100 [IU]/ML
35 INJECTION, SOLUTION INTRAVENOUS; SUBCUTANEOUS
Qty: 1 ML | Refills: 0 | Status: ON HOLD
Start: 2023-02-24 | End: 2023-08-06 | Stop reason: HOSPADM

## 2023-02-24 RX ORDER — HEPARIN SODIUM 1000 [USP'U]/ML
2500 INJECTION, SOLUTION INTRAVENOUS; SUBCUTANEOUS
Status: DISCONTINUED | OUTPATIENT
Start: 2023-02-24 | End: 2023-02-24

## 2023-02-24 RX ORDER — INSULIN GLARGINE 100 [IU]/ML
75 INJECTION, SOLUTION SUBCUTANEOUS NIGHTLY
Qty: 1 ML | Refills: 0 | Status: ON HOLD
Start: 2023-02-24 | End: 2023-08-06 | Stop reason: HOSPADM

## 2023-02-24 RX ORDER — ATORVASTATIN CALCIUM 40 MG/1
40 TABLET, FILM COATED ORAL NIGHTLY
Qty: 30 TABLET | Refills: 3 | Status: ON HOLD
Start: 2023-02-24

## 2023-02-24 RX ADMIN — INSULIN LISPRO 10 UNITS: 100 INJECTION, SOLUTION INTRAVENOUS; SUBCUTANEOUS at 16:51

## 2023-02-24 RX ADMIN — OXYCODONE AND ACETAMINOPHEN 1 TABLET: 7.5; 325 TABLET ORAL at 16:51

## 2023-02-24 RX ADMIN — ONDANSETRON 4 MG: 2 INJECTION INTRAMUSCULAR; INTRAVENOUS at 10:33

## 2023-02-24 RX ADMIN — CLOPIDOGREL BISULFATE 75 MG: 75 TABLET ORAL at 13:56

## 2023-02-24 RX ADMIN — OXYCODONE AND ACETAMINOPHEN 1 TABLET: 7.5; 325 TABLET ORAL at 08:08

## 2023-02-24 RX ADMIN — INSULIN LISPRO 10 UNITS: 100 INJECTION, SOLUTION INTRAVENOUS; SUBCUTANEOUS at 13:57

## 2023-02-24 RX ADMIN — CALCITRIOL 0.5 MCG: 0.5 CAPSULE, LIQUID FILLED ORAL at 13:56

## 2023-02-24 RX ADMIN — HEPARIN SODIUM 5000 UNITS: 5000 INJECTION INTRAVENOUS; SUBCUTANEOUS at 06:35

## 2023-02-24 RX ADMIN — CARVEDILOL 25 MG: 25 TABLET, FILM COATED ORAL at 16:51

## 2023-02-24 RX ADMIN — ASPIRIN 81 MG: 81 TABLET, COATED ORAL at 13:56

## 2023-02-24 RX ADMIN — EPOETIN ALFA-EPBX 10000 UNITS: 10000 INJECTION, SOLUTION INTRAVENOUS; SUBCUTANEOUS at 09:18

## 2023-02-24 RX ADMIN — LISINOPRIL 2.5 MG: 5 TABLET ORAL at 13:56

## 2023-02-24 ASSESSMENT — PAIN DESCRIPTION - LOCATION: LOCATION: FOOT

## 2023-02-24 ASSESSMENT — PAIN - FUNCTIONAL ASSESSMENT: PAIN_FUNCTIONAL_ASSESSMENT: PREVENTS OR INTERFERES WITH ALL ACTIVE AND SOME PASSIVE ACTIVITIES

## 2023-02-24 ASSESSMENT — PAIN DESCRIPTION - DESCRIPTORS: DESCRIPTORS: ACHING;CRAMPING

## 2023-02-24 ASSESSMENT — PAIN DESCRIPTION - ORIENTATION: ORIENTATION: LEFT

## 2023-02-24 ASSESSMENT — PAIN SCALES - GENERAL: PAINLEVEL_OUTOF10: 7

## 2023-02-24 NOTE — PROGRESS NOTES
Medication reconciliation    Atorvastatin was deleted by the pharmacy tech, the patient I put it back on there and I told him to check with his PCP about it I did not call him a new prescription    Same thing with metoprolol    He thinks he said Dr. Senthil Tafoya took him off of it  I presume maybe it is because of low blood pressure    The only new medication was calcitriol.   Dr. Estefani Ochoa the nephrologist put him on it this admission, but the pharmacy tech had cross that off of his med rec  I only gave him a 14-day supply at half the dose that Dr. Estefani Ochoa put him on

## 2023-02-24 NOTE — PROGRESS NOTES
Veldon Mcardle    See the ED diagnoses  See the discharge summary from February    The ABG was positive  Hemoglobin 8.5  He said that his hemoglobin A1c was 6.1  He was proud of it    The admissions fall about 2 screens and started in 2013    Follow-ups with-Dr. Araceli Mckeon for [facility] Ferraro of cardiology  Dr. Houston Molina of general surgery  Dr. Lexie Alberts of nephrology  The wound care clinic      Home health care orders written      Pneumonia symptoms --none  Fever -- no  Cough -- no

## 2023-02-24 NOTE — DISCHARGE SUMMARY
V2.0  Discharge Summary    Name:  Susi Bajwa /Age/Sex: 1975 (31 y.o. male)   Admit Date: 2023  Discharge Date: 23    MRN & CSN:  [id number] & [id number] Encounter Date and Time 23 3:55 PM EST    Attending:  Sujata Lopez MD Discharging Provider: Iliana Mishra MD       Hospital Course:     Brief HPI: Susi Bajwa is a 52 y.o. male who presented with ***    Brief Problem Based Course:   ***      The patient expressed appropriate understanding of, and agreement with the discharge recommendations, medications, and plan. Consults this admission:  IP CONSULT TO NEPHROLOGY  IP CONSULT TO PULMONOLOGY  IP CONSULT TO HOME CARE NEEDS    Discharge Diagnosis:   Volume overload    ***    Discharge Instruction:   Follow up appointments: ***  Primary care physician: Janet Sorenson MD within 2 weeks  Diet: {diet:28577}   Activity: {discharge activity:76413}  Disposition: Discharged to:   []Home, [][facility], []SNF, []Acute Rehab, []Hospice ***  Condition on discharge: Stable  Labs and Tests to be Followed up as an outpatient by PCP or Specialist: ***    Discharge Medications:        Medication List        START taking these medications      calcitRIOL 0.5 MCG capsule  Commonly known as: ROCALTROL  Take 1 capsule by mouth daily            CONTINUE taking these medications      ARIPiprazole 5 MG tablet  Commonly known as: ABILIFY  Take 1 tablet by mouth at bedtime     aspirin 81 MG EC tablet  Take 1 tablet by mouth daily     atorvastatin 40 MG tablet  Commonly known as: LIPITOR  Take 1 tablet by mouth nightly     calcium carbonate 500 MG chewable tablet  Commonly known as: TUMS     carvedilol 25 MG tablet  Commonly known as: COREG     clopidogrel 75 MG tablet  Commonly known as: PLAVIX  Take 1 tablet by mouth daily     glucose monitoring kit  1 each by Does not apply route once for 1 dose.      insulin lispro (1 Unit Dial) 100 UNIT/ML Sopn  Commonly known as: HumaLOG KwikPen  Inject 35 Units into the skin 3 times daily (before meals)     Lancets Misc  1 each by Does not apply route daily     Lantus SoloStar 100 UNIT/ML injection pen  Generic drug: insulin glargine  Inject 75 Units into the skin nightly     lisinopril 2.5 MG tablet  Commonly known as: PRINIVIL;ZESTRIL     oxyCODONE-acetaminophen 7.5-325 MG per tablet  Commonly known as: PERCOCET            STOP taking these medications      chlorthalidone 25 MG tablet  Commonly known as: HYGROTON     furosemide 40 MG tablet  Commonly known as: Lasix     insulin aspart 100 UNIT/ML injection vial  Commonly known as: NOVOLOG     linagliptin 5 MG tablet  Commonly known as: TRADJENTA     metoprolol tartrate 25 MG tablet  Commonly known as: LOPRESSOR               Where to Get Your Medications        These medications were sent to [facility] [id number] [facility], [address] [place] [address] [address] [phone]  [facility] [place] [address]      Phone: [phone]   calcitRIOL 0.5 MCG capsule       Information about where to get these medications is not yet available    Ask your nurse or doctor about these medications  atorvastatin 40 MG tablet  insulin lispro (1 Unit Dial) 100 UNIT/ML Sopn  Lantus SoloStar 100 UNIT/ML injection pen        Objective Findings at Discharge:   /67   Pulse 80   Temp 97.9 °F (36.6 °C) (Oral)   Resp 18   Ht 5' 9\" (1.753 m)   Wt 268 lb 11.9 oz (121.9 kg)   SpO2 97%   BMI 39.69 kg/m²       Physical Exam:   General: NAD  Eyes: EOMI  ENT: neck supple  Cardiovascular: Regular rate. Respiratory: Clear to auscultation  Gastrointestinal: Soft, non tender  Genitourinary: no suprapubic tenderness  Musculoskeletal: No edema  Skin: warm, dry  Neuro: Alert. Psych: Mood appropriate.          Labs and Imaging   XR CHEST PORTABLE    Result Date: 2/23/2023  EXAMINATION: ONE XRAY VIEW OF THE CHEST 2/22/2023 8:14 am COMPARISON: 02/07/2023 HISTORY: ORDERING SYSTEM PROVIDED HISTORY: Shortness of Breath TECHNOLOGIST PROVIDED HISTORY: Reason for exam:->Shortness of Breath Reason for Exam: sob FINDINGS: The heart is enlarged but is similar in size to the prior study. No definite pleural effusion or pneumothorax is seen. Increased interstitial opacities bilaterally. Similar appearance of the right-sided CVC. Increased interstitial opacities bilaterally, suspicious for pulmonary edema. Infection can also be considered. Similar cardiomegaly. CBC:   Recent Labs     02/22/23  0825 02/23/23 0446 02/24/23 0343   WBC 7.4 6.0 6.4   HGB 8.9* 7.7* 8.5*    186 84*     BMP:    Recent Labs     02/22/23  0825 02/23/23 0446 02/24/23 0343    139 138   K 4.4 4.5 4.7   CL 96* 100 100   CO2 24 23 24   BUN 70* 52* 49*   CREATININE 8.6* 6.9* 5.8*   GLUCOSE 192* 219* 204*     Hepatic:   Recent Labs     02/22/23 0825 02/23/23 0446   AST 12* 9*   ALT 9* 7*   BILITOT 0.2 0.2   ALKPHOS 68 60     Lipids:   Lab Results   Component Value Date/Time    CHOL 166 09/22/2022 02:02 PM    CHOL 168 10/15/2013 10:30 AM    HDL 30 09/22/2022 02:02 PM    TRIG 254 09/22/2022 02:02 PM     Hemoglobin A1C:   Lab Results   Component Value Date/Time    LABA1C 5.7 09/14/2022 03:47 AM     TSH: No results found for: TSH  Troponin:   Lab Results   Component Value Date/Time    TROPONINT 0.176 02/08/2023 05:45 AM    TROPONINT 0.183 02/07/2023 09:17 PM    TROPONINT 0.155 09/13/2022 11:49 AM     Lactic Acid: No results for input(s): LACTA in the last 72 hours.   BNP:   Recent Labs     02/22/23 0825   PROBNP 23,017*     UA:  Lab Results   Component Value Date/Time    NITRU NEGATIVE 07/24/2022 11:40 PM    COLORU YELLOW 07/24/2022 11:40 PM    PHUR 6.5 06/20/2013 09:51 AM    WBCUA 2 07/24/2022 11:40 PM    RBCUA 1 07/24/2022 11:40 PM    MUCUS RARE 07/24/2022 11:40 PM    TRICHOMONAS NONE SEEN 07/24/2022 11:40 PM    BACTERIA NEGATIVE 07/24/2022 11:40 PM    CLARITYU CLEAR 07/24/2022 11:40 PM    SPECGRAV 1.020 07/24/2022 11:40 PM    LEUKOCYTESUR NEGATIVE 07/24/2022 11:40 PM    UROBILINOGEN 0.2 07/24/2022 11:40 PM    BILIRUBINUR NEGATIVE 07/24/2022 11:40 PM    BILIRUBINUR neg 06/20/2013 09:51 AM    BLOODU SMALL NUMBER OR AMOUNT OBSERVED 07/24/2022 11:40 PM    GLUCOSEU 1,000 06/20/2013 09:51 AM    KETUA NEGATIVE 07/24/2022 11:40 PM    AMORPHOUS RARE 07/24/2022 11:40 PM     Urine Cultures: No results found for: LABURIN  Blood Cultures: No results found for: BC  No results found for: BLOODCULT2  Organism:   Lab Results   Component Value Date/Time    ORG SA 01/07/2019 12:08 AM       Time Spent Discharging patient *** minutes    Electronically signed by Felix Beatty MD on 2/24/2023 at 3:55 PM BMP:    Recent Labs     02/22/23  0825 02/23/23  0446 02/24/23  0343    139 138   K 4.4 4.5 4.7   CL 96* 100 100   CO2 24 23 24   BUN 70* 52* 49*   CREATININE 8.6* 6.9* 5.8*   GLUCOSE 192* 219* 204*     Hepatic:   Recent Labs     02/22/23  0825 02/23/23  0446   AST 12* 9*   ALT 9* 7*   BILITOT 0.2 0.2   ALKPHOS 68 60     Lipids:   Lab Results   Component Value Date/Time    CHOL 166 09/22/2022 02:02 PM    CHOL 168 10/15/2013 10:30 AM    HDL 30 09/22/2022 02:02 PM    TRIG 254 09/22/2022 02:02 PM     Hemoglobin A1C:   Lab Results   Component Value Date/Time    LABA1C 5.7 09/14/2022 03:47 AM     TSH: No results found for: TSH  Troponin:   Lab Results   Component Value Date/Time    TROPONINT 0.176 02/08/2023 05:45 AM    TROPONINT 0.183 02/07/2023 09:17 PM    TROPONINT 0.155 09/13/2022 11:49 AM     Lactic Acid: No results for input(s): LACTA in the last 72 hours.   BNP:   Recent Labs     02/22/23  0825   PROBNP 23,017*     UA:  Lab Results   Component Value Date/Time    NITRU NEGATIVE 07/24/2022 11:40 PM    COLORU YELLOW 07/24/2022 11:40 PM    PHUR 6.5 06/20/2013 09:51 AM    WBCUA 2 07/24/2022 11:40 PM    RBCUA 1 07/24/2022 11:40 PM    MUCUS RARE 07/24/2022 11:40 PM    TRICHOMONAS NONE SEEN 07/24/2022 11:40 PM    BACTERIA NEGATIVE 07/24/2022 11:40 PM    CLARITYU CLEAR 07/24/2022 11:40 PM    SPECGRAV 1.020 07/24/2022 11:40 PM    LEUKOCYTESUR NEGATIVE 07/24/2022 11:40 PM    UROBILINOGEN 0.2 07/24/2022 11:40 PM    BILIRUBINUR NEGATIVE 07/24/2022 11:40 PM    BILIRUBINUR neg 06/20/2013 09:51 AM    BLOODU SMALL NUMBER OR AMOUNT OBSERVED 07/24/2022 11:40 PM    GLUCOSEU 1,000 06/20/2013 09:51 AM    KETUA NEGATIVE 07/24/2022 11:40 PM    AMORPHOUS RARE 07/24/2022 11:40 PM       Time Spent Discharging patient 45 minutes    Electronically signed by Kiesha Julian MD on 2/24/2023 at 3:55 PM

## 2023-02-24 NOTE — PROGRESS NOTES
[address] [place] [address] [address] [place] [address]  Phone: [phone]  Office Hours: 8:30AM - 4:30PM  Monday - Friday      Nephrology  Dialysis Note        PROCEDURE:  Patient seen during hemodialysis      PHYSICIAN:  PK      INDICATION:  Anasarca, Congestive heart failure, End-stage renal disease      RX:  See dialysis flowsheet for specifics on access, blood flow rate, dialysate baths, duration of dialysis, anticoagulation and other technical information.       COMMENTS:  stable

## 2023-02-24 NOTE — CONSULTS
Subjective:   CHIEF COMPLAINT / HPI:  52year old male with loud snoring. He is witnessed to stop breathing. He does not feel fresh when wakes up and feels tired and fatigued all day. He has EDS. he has gained considerable weight over the years. Past Medical History:  Past Medical History:   Diagnosis Date    Abscess 2010    scrotal    Acute renal failure (ARF) ([address] 08/04/2019    Anxiety associated with depression     Anxiety associated with depression     Back pain 07/02/2012    CAD (coronary artery disease) 2/10/2023    Chest pain 05/01/2013    Diabetic nephropathy ([address]     Diabetic neuropathy ([address] 12/22/2011    Diabetic ulcer of left midfoot associated with type 2 diabetes mellitus, with fat layer exposed ([address] 07/18/2017    Diverticulosis     C scope + Dr. Hilary Gimenez    DM (diabetes mellitus), type 2 ([address] 2002    DR. 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 Hilary Gimenez    Necrotizing fasciitis [facility])     Nose fracture 1988    Panic attacks     Pericarditis 2003    Hospitalized with s/p heart cath normal    Peripheral autonomic neuropathy due to diabetes mellitus ([address]     Axonal EMG- NCS, March 2011    Sebaceous cyst 09/01/2011    URI (upper respiratory infection) 02/27/2012    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 type 2 diabetes mellitus, with muscle involvement without evidence of necrosis ([address] 9/21/2021    WD-Wound, surgical, infected, initial encounter 12/08/2017    Wrist fracture 1986       Past Surgical History:        Procedure Laterality Date    ABDOMEN SURGERY      CARDIAC CATHETERIZATION  2003, 2013    Normal (Dr. Karan Bowden)    COLONOSCOPY  06/02/2011    Pandiverticulosis, Nonbleeding internal hemorrhoids, Repeat colonoscopy at age 48- Dr. Magda Valera PLACEMENT      x 3    FOOT SURGERY Left 12/21/2021    EXCISION OF HEAD LEFT 2ND METATARSAL performed by Doug Buckner DPM at [address] [facility] [address] NONTUNNELED VASCULAR CATHETER  07/25/2022    IR NONTUNNELED VASCULAR CATHETER 7/25/2022 [facility] SPECIAL PROCEDURES    IR TUNNELED CATHETER PLACEMENT GREATER THAN 5 YEARS  07/27/2022    IR TUNNELED CATHETER PLACEMENT GREATER THAN 5 YEARS 7/27/2022 [facility] SPECIAL PROCEDURES    IR TUNNELED CATHETER PLACEMENT GREATER THAN 5 YEARS  11/28/2022    IR TUNNELED CATHETER PLACEMENT GREATER THAN 5 YEARS 11/28/2022 [facility] SPECIAL PROCEDURES    NOSE SURGERY  1993    \"had reconstruction surgery on nose a year after the other nose surgery\"    OTHER SURGICAL HISTORY Right 05/22/2015    I & D right great toe with partial amputation    OTHER SURGICAL HISTORY  12/04/2017    inc and drainage of abcess    IN INCISION BONE CORTEX FOOT Left 09/22/2018    LEFT FOOT DEBRIDEMENT INCISION AND DRAINAGE TOP AND BOTTOM performed by Nicole Aragon DPM at [facility] N/A 05/15/2021    SCROTAL AND PERINEAL DEBRIDEMENT performed by Shabbir Valenzuela MD at [place] [address] 05/16/2021    SCROTAL RE-DEBRIDEMENT  INCISION AND DRAINAGE performed by Shabbir Valenzuela MD at [address] N/A 06/18/2013    sebaceous cyst removal times 4     TOE AMPUTATION      right great toe    TOE AMPUTATION Right 03/23/2019    TOE AMPUTATION RIGHT 5TH TOE performed by Nicole Aragon DPM at [address] Right 08/06/2019    TOE AMPUTATION RIGHT 4TH TOE performed by Nicole Aragon DPM at [facility] ENDOSCOPY  06/02/2011    Mild gastritis with moderatley severe antritis, small hiatal hernia, [facility] 01/12/2019    EGD BIOPSY performed by Shweta May MD at [place] [address] N/A 07/26/2022    EGD DIAGNOSTIC ONLY performed by Shweta May MD at [facility] Current Medications:    Current Facility-Administered Medications: epoetin paola-epbx (RETACRIT) injection 10,000 Units, 10,000 Units, IntraVENous, Once in dialysis  insulin glargine (LANTUS) injection vial 30 Units, 30 Units, SubCUTAneous, Nightly  sodium chloride flush 0.9 % injection 5-40 mL, 5-40 mL, IntraVENous, 2 times per day  0.9 % sodium chloride infusion, , IntraVENous, PRN  ondansetron (ZOFRAN-ODT) disintegrating tablet 4 mg, 4 mg, Oral, Q8H PRN **OR** ondansetron (ZOFRAN) injection 4 mg, 4 mg, IntraVENous, Q6H PRN  polyethylene glycol (GLYCOLAX) packet 17 g, 17 g, Oral, Daily PRN  acetaminophen (TYLENOL) tablet 650 mg, 650 mg, Oral, Q6H PRN **OR** acetaminophen (TYLENOL) suppository 650 mg, 650 mg, Rectal, Q6H PRN  calcitRIOL (ROCALTROL) capsule 0.5 mcg, 0.5 mcg, Oral, BID  heparin (porcine) injection 5,000 Units, 5,000 Units, SubCUTAneous, 3 times per day  insulin lispro (HUMALOG) injection vial 10 Units, 10 Units, SubCUTAneous, TID WC  insulin lispro (HUMALOG) injection vial 0-4 Units, 0-4 Units, SubCUTAneous, TID WC  insulin lispro (HUMALOG) injection vial 0-4 Units, 0-4 Units, SubCUTAneous, Nightly  glucose chewable tablet 16 g, 4 tablet, Oral, PRN  dextrose bolus 10% 125 mL, 125 mL, IntraVENous, PRN **OR** dextrose bolus 10% 250 mL, 250 mL, IntraVENous, PRN  glucagon (rDNA) injection 1 mg, 1 mg, SubCUTAneous, PRN  dextrose 10 % infusion, , IntraVENous, Continuous PRN  ARIPiprazole (ABILIFY) tablet 5 mg, 5 mg, Oral, Nightly  aspirin EC tablet 81 mg, 81 mg, Oral, Daily  carvedilol (COREG) tablet 25 mg, 25 mg, Oral, BID WC  clopidogrel (PLAVIX) tablet 75 mg, 75 mg, Oral, Daily  lisinopril (PRINIVIL;ZESTRIL) tablet 2.5 mg, 2.5 mg, Oral, Daily  oxyCODONE-acetaminophen (PERCOCET) 7.5-325 MG per tablet 1 tablet, 1 tablet, Oral, Q8H PRN    Allergies   Allergen Reactions    Adhesive Tape Rash    Doxycycline Nausea And Vomiting    Reglan [Metoclopramide] Anxiety       Social History:    Social History Socioeconomic History    Marital status:      Spouse name: None    Number of children: None    Years of education: None    Highest education level: None   Tobacco Use    Smoking status: Former     Years: 20.00     Types: Cigarettes     Quit date: 2022     Years since quittin.6    Smokeless tobacco: Never    Tobacco comments:     Smokes when drinking/social   Vaping Use    Vaping Use: Never used   Substance and Sexual Activity    Alcohol use: Not Currently     Comment: occ    Drug use: Yes     Frequency: 7.0 times per week     Types: Marijuana Garserjio Ho)     Comment: 21 @     Sexual activity: Yes     Partners: Female       Family History:   Family History   Problem Relation Age of Onset    Cancer Mother         ?site    Stroke Mother     Bleeding Prob Mother     Diabetes Father     Heart Disease Father     High Blood Pressure Father     Obesity Father     Kidney Disease Father     Diabetes Sister     High Blood Pressure Sister     Mental Illness Sister     Obesity Sister     High Blood Pressure Other     Mental Illness Other         bipolar    Other Son         cyst in ear canal    ADHD Daughter        Immunization:  Immunization History   Administered Date(s) Administered    Influenza 10/15/2013    Influenza, Quadv, 6 mo and older, IM, PF (Flulaval, Fluarix) 2018    Pneumococcal Polysaccharide ([id number]) 2013    Tdap (Boostrix, Adacel) 2022         REVIEW OF SYSTEMS:    CONSTITUTIONAL:  negative for fevers, chills, diaphoresis, activity change, appetite change, fatigue, night sweats and unexpected weight change.    EYES:  negative for blurred vision, eye discharge, visual disturbance and icterus  HEENT:  negative for hearing loss, tinnitus, ear drainage, sinus pressure, nasal congestion, epistaxis and snoring  RESPIRATORY:  See HPI  CARDIOVASCULAR:  negative for chest pain, palpitations, exertional chest pressure/discomfort, edema, syncope  GASTROINTESTINAL:  negative for nausea, vomiting, diarrhea, constipation, blood in stool and abdominal pain  GENITOURINARY:  negative for frequency, dysuria and hematuria  HEMATOLOGIC/LYMPHATIC:  negative for easy bruising, bleeding and lymphadenopathy  ALLERGIC/IMMUNOLOGIC:  negative for recurrent infections, angioedema, anaphylaxis and drug reactions  ENDOCRINE:  negative for weight changes and diabetic symptoms including polyuria, polydipsia and polyphagia    MUSCULOSKELETAL:  negative for  pain, joint swelling, decreased range of motion and muscle weakness  NEUROLOGICAL:  negative for headaches, slurred speech, unilateral weakness  PSYCHIATRIC/BEHAVIORAL: negative for hallucinations, behavioral problems, confusion and agitation. Objective:   PHYSICAL EXAM:      VITALS:    Vitals:    02/24/23 0025 02/24/23 0030 02/24/23 0407 02/24/23 0501   BP: (!) 129/54 (!) 148/83 (!) 155/89    Pulse: 86 81 76 78   Resp: 17 21  16   Temp: 98.6 °F (37 °C) 97.6 °F (36.4 °C)  97.8 °F (36.6 °C)   TempSrc: Oral Oral  Oral   SpO2:  98% 94% 95%   Weight:    266 lb 15.6 oz (121.1 kg)   Height:             CONSTITUTIONAL:  awake, alert, cooperative, no apparent distress, and appears stated age  NECK:  Supple, symmetrical, trachea midline, no adenopathy, thyroid symmetric, not enlarged and no tenderness  CHEST: Chest expansion equal and symmetrical, no intercostal retraction. LUNGS:  no increased work of breathing, has expiratory wheezes both lungs, no crackles. CARDIOVASCULAR: S1 and S2, no edema and no JVD  ABDOMEN:  normal bowel sounds, non-distended and no masses palpated, and no tenderness to palpation. No hepatospleenomegaly  LYMPHADENOPATHY:  no axillary or supraclavicular adenopathy. No cervical adnenopathy  PSYCHIATRIC: Oriented to person place and time. No obvious depression or anxiety. MUSCULOSKELETAL: No obvious misalignment or effusion of the joints. No clubbing, cyanosis of the digits.   RIGHT AND LEFT LOWER EXTREMITIES: No edema, no inflammation, no tenderness. SKIN:  normal skin color, texture, turgor and no redness, warmth, or swelling. No palpable nodules    DATA:         EXAMINATION:   ONE XRAY VIEW OF THE CHEST       2/22/2023 8:14 am       COMPARISON:   02/07/2023       HISTORY:   ORDERING SYSTEM PROVIDED HISTORY: Shortness of Breath   TECHNOLOGIST PROVIDED HISTORY:   Reason for exam:->Shortness of Breath   Reason for Exam: sob       FINDINGS:   The heart is enlarged but is similar in size to the prior study. No definite   pleural effusion or pneumothorax is seen. Increased interstitial opacities   bilaterally. Similar appearance of the right-sided CVC. Impression   Increased interstitial opacities bilaterally, suspicious for pulmonary edema. Infection can also be considered. Similar cardiomegaly.                      Assessment:     Probable felicia  Volume overload  Ch kidney disease on HD          Plan:     D/w pt  Advised weight loss  Sleep hygiene  Continue bipap while in hospital while sleeping  Sleep study as outpt  Thanks will follow

## 2023-02-24 NOTE — PROGRESS NOTES
Patient tolerated 4hr hemodialysis treatment fair today. 3.3L of fluid was removed via HD CVC in right subclavian. Pt did c/o N/V during HD, PRN Zofran given. Medication intervention was effective, pt had no more complaints of N/V. Retacrit given as per order- no heparin given d/t low Hemoglobin and Hematocrit. New order from MD Kwesi Motta was to given intermediate flushes of saline. At the end of HD blood was rinsed back to pt successfully. CVC lines were flushed and locked with saline, then capped. HD txt overseen DARREN Loera RN           Patient Name: Angie Aguilera  Patient : 1975  MRN: [id number]     Acct: [de-identified]  Date of Admission: 2023  Room/Bed: 3116/3116-A  Code Status:  Full Code  Allergies:    Allergies   Allergen Reactions    Adhesive Tape Rash    Doxycycline Nausea And Vomiting    Reglan [Metoclopramide] Anxiety     Diagnosis:    Patient Active Problem List   Diagnosis    Hiatal hernia    Diabetes mellitus type 2, improved controlled    Diabetic neuropathy (HCC)    Panic attack    Anxiety associated with depression    Neck pain    DANIELA (obstructive sleep apnea)    Urinary frequency    Bilateral carpal tunnel syndrome- left worse than right    Hyperlipidemia with target LDL less than 100    Essential hypertension    Bronchitis    Osteomyelitis (HCC)    Abscess    Periumbilical hernia    Sepsis (HCC)    Cellulitis of left foot    Constipation    Intractable nausea and vomiting    Uncontrolled type 2 diabetes mellitus    Nausea and vomiting    Diabetic foot infection (HCC)    Acute renal failure (ARF) (HCC)    Cellulitis    Cellulitis of left arm    Cellulitis, scrotum    Acute epididymitis    Irene gangrene    Recurrent major depressive disorder, in full remission (HCC)    Generalized anxiety disorder    Morbid obesity with body mass index (BMI) of 40.0 to 44.9 in adult [facility])    Necrotizing fasciitis ([facility]    Syncope    Right groin wound    Ulcer of right groin with fat layer exposed ([address] 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 type 2 diabetes mellitus, with muscle involvement without evidence of necrosis ([facility])    Nephrotic syndrome    Generalized edema    Stage 3b chronic kidney disease ([facility] [address]    Diabetic nephropathy associated with type 2 diabetes mellitus ([facility])    Hypoalbuminemia    Chronic kidney disease, stage IV (severe) ([facility])    FH: CAD (coronary artery disease)    ASCVD (arteriosclerotic cardiovascular disease)    Other proteinuria    Chest pain    Surgical wound dehiscence    CARMEN (acute kidney injury) ([facility])    Anemia    Chest tightness    NSTEMI (non-ST elevated myocardial infarction) ([facility])    WD - Ulcer of forefoot due to type 2 diabetes mellitus ([facility] [address]    ESRD (end stage renal disease) ([facility]    Problem with vascular access    Fluid overload    Acute on chronic respiratory failure with hypoxia ([facility])    CAD (coronary artery disease)    Volume overload         Treatment:  Hemodilaysis 2:1  Priority: Routine  Location: Acute Room    Diabetic: Yes  NPO: No  Isolation Precautions: None     Consent for Treatment Verified: Yes  Blood Consent Verified: Not Applicable     Safety Verified: Identify (I), Consent (C), Equipment (E), HepB Status (B), Orders Complete (O), Access Verified (A), and Timeliness (T)  Time out performed prior to access at 0815 hours. Report Received from Primary RN at 0730 hours. Primary RN (First Initial, Last Name, Title): DARREN Beard RN  Incapacitated Nurse Education Completed: Not Applicable     HBsAg ONLY:  Date Drawn: February 13, 2023       Results: Negative  HBsAb:  Date Drawn:  February 24, 2023       Results: Unknown    Order  Dialysis Bath  K+ (Potassium): 3  Ca+ (Calcium):  (3.5)  Na+ (Sodium): 137  HCO3 (Bicarb): 35  Bicarbonate Concentrate Lot No.: [id number]  Acid Concentrate Lot No.: [id number]     Na+ Modeling: Not Applicable  Dialyzer: [id number]  Dialysate Temperature (C):  36  Blood Flow Rate (BFR):  350   Dialysate Flow Rate (DFR):   700 Access to be Utilized   Access: Tunneled Catheter  Location: Subclavian  Side: Right   Needle gauge:  Not Applicable  + Bruit/Thrill: Not Applicable    First Use X-ray Verified: Yes  OK to use line order: Yes    Site Assessment:  Signs and Symptoms of Infection/Inflammation: None  If yes: Not Applicable  Dressing: Dry and Intact  Site Prep: Medical Aseptic Technique  Dressing Changed this Treatment: No  If yes, by whom: NA - not changed today  Date of Last Dressing Change: February 23, 2023  Antimicrobial Patch in place?: Yes  Red Alcohol Caps in place?: Yes  Gauze Dressing?: No  Non Dialysis Use?: No  Comment:    Flows: Good, Patent  If access problem, who was notified:     Pre and Post-Assessment  Patient Vitals for the past 8 hrs:   Level of Consciousness Oriented X Heart Rhythm Respiratory Quality/Effort O2 Device Bilateral Breath Sounds Skin Color Skin Condition/Temp Abdomen Inspection Bowel Sounds (All Quadrants) Edema Edema Generalized Pain Level   02/24/23 0808 0 -- -- -- Nasal cannula -- -- -- -- -- -- -- 7   02/24/23 0815 0 4 Regular Unlabored Nasal cannula Diminished Pink Dry;Warm Obese; Soft Active Generalized Non-pitting --     Labs  Recent Labs     02/22/23  0825 02/23/23  0446 02/24/23  0343   WBC 7.4 6.0 6.4   HGB 8.9* 7.7* 8.5*   HCT 27.7* 24.1* 29.3*    186 84*                                                                  Recent Labs     02/22/23  0825 02/23/23  0446 02/24/23  0343    139 138   K 4.4 4.5 4.7   CL 96* 100 100   CO2 24 23 24   BUN 70* 52* 49*   CREATININE 8.6* 6.9* 5.8*   GLUCOSE 192* 219* 204*     IV Drips and Rate/Dose   sodium chloride      dextrose        Safety - Before each treatment:   Dialysis Machine No.: [id number]   Machine Number: [id number]  Dialyzer Lot No.: [id number]  RO Machine Log Sheet Completed: Yes  Machine Alarm Self Test: Completed; Passed (02/24/23 0815)     Air Foam Detector: Tested, Proper Function, pH Reading  Extracorporeal Circuit Tested for Integrity: Yes  Machine Conductivity: 14  Manual Conductivity: 14     Bicarbonate Concentrate Lot No.: [id number]  Acid Concentrate Lot No.: [id number]  Manual Ph: 7.4  Bleach Test (Neg): Yes  Bath Temperature: 96.8 °F (36 °C)  Tubing Lot#: [id number]  Conductivity Meter Serial #: [id number]  All Connections Secure?: Yes  Venous Parameters Set?: Yes  Arterial Parameters Set?: Yes  Saline Line Double Clamped?: Yes  Air Foam Detector Engaged?: Yes  Machine Functioning Alarm Free?  Yes  Prime Given: 200ml    Chlorine Testing - Before each treatment and every 4 hours:   Treatment  Time On: 0832  Time Off: 1225  Weight: 268 lb 11.9 oz (121.9 kg) (02/24/23 1225)  1st check: less than 0.1 ppm at: 0650 hours  2nd check: less than 0.1 ppm at: 1035 hours  3rd check: Not Applicable  (if greater than 0.1 ppm, then check every 30 minutes from secondary)    Access Flows and Pressures  Patient Vitals for the past 8 hrs:   Blood Flow Rate (ml/min) Ultrafiltration Rate (ml/hr) Arterial Pressure (mmHg) Venous Pressure (mmHg) TMP DFR Comments Access Visible   02/24/23 0832 250 ml/min 1000 ml/hr -50 mmHg 60 50 700 tx initated Yes   02/24/23 0845 350 ml/min 1000 ml/hr -90 mmHg 110 50 700 resting with eyes closed Yes   02/24/23 0900 350 ml/min 1000 ml/hr -90 mmHg 100 50 700 resting Yes   02/24/23 0915 350 ml/min 1000 ml/hr -80 mmHg 100 50 700 epo given 50ml flush Yes   02/24/23 0930 350 ml/min 1000 ml/hr -90 mmHg 100 50 700 lines secure Yes   02/24/23 0945 350 ml/min 1000 ml/hr -90 mmHg 110 50 700 no complaints Yes   02/24/23 1000 350 ml/min 1000 ml/hr -90 mmHg 110 50 700 resting Yes   02/24/23 1015 350 ml/min 1000 ml/hr -90 mmHg 110 40 700 no distress Yes   02/24/23 1030 350 ml/min 1000 ml/hr -90 mmHg 120 50 700 nauseous Yes   02/24/23 1045 350 ml/min 1000 ml/hr -90 mmHg 110 50 700 feeling better Yes   02/24/23 1100 350 ml/min 1000 ml/hr -100 mmHg 100 50 700 resting Yes   02/24/23 1115 350 ml/min 1000 ml/hr -100 mmHg 110 50 700 no change Yes 02/24/23 1130 350 ml/min 1000 ml/hr -90 mmHg 110 50 700 resting Yes   02/24/23 1145 350 ml/min 1000 ml/hr -100 mmHg 120 50 700 resting quietly Yes   02/24/23 1200 350 ml/min 1000 ml/hr -100 mmHg 120 50 700 denies needs Yes   02/24/23 1215 350 ml/min 1000 ml/hr -300 mmHg 120 50 700 venous high Yes   02/24/23 1225 -- 1000 ml/hr -- -- -- -- txt completed Yes     Vital Signs  Patient Vitals for the past 8 hrs:   BP Temp Pulse Resp SpO2 Weight Weight Method Percent Weight Change   02/24/23 0808 (!) 147/73 97.8 °F (36.6 °C) 76 18 96 % -- -- --   02/24/23 0815 (!) 147/73 98.3 °F (36.8 °C) 77 -- -- 275 lb 9.2 oz (125 kg) -- 3.22   02/24/23 0830 132/73 -- -- -- -- -- -- --   02/24/23 0832 132/73 -- 75 -- -- -- -- --   02/24/23 0845 (!) 148/80 -- 74 -- -- -- -- --   02/24/23 0900 (!) 142/89 -- 74 -- -- -- -- --   02/24/23 0915 138/85 -- 72 -- -- -- -- --   02/24/23 0930 (!) 133/90 -- 74 -- -- -- -- --   02/24/23 0945 108/84 -- 73 -- -- -- -- --   02/24/23 1000 125/83 -- 73 -- -- -- -- --   02/24/23 1015 (!) 95/56 -- 74 -- -- -- -- --   02/24/23 1030 115/75 -- 73 -- -- -- -- --   02/24/23 1045 135/87 -- 74 -- -- -- -- --   02/24/23 1100 123/84 -- 73 -- -- -- -- --   02/24/23 1115 (!) 133/90 -- 72 -- -- -- -- --   02/24/23 1130 131/81 -- 73 -- -- -- -- --   02/24/23 1145 106/82 -- 73 -- -- -- -- --   02/24/23 1200 97/74 -- 73 -- -- -- -- --   02/24/23 1215 (!) 133/97 -- 77 -- -- -- -- --   02/24/23 1225 136/78 98.2 °F (36.8 °C) 75 -- -- 268 lb 11.9 oz (121.9 kg) Bed scale -2.48     Post-Dialysis  Arterial Catheter Locking Solution:  saline  Venous Catheter Locking Solution:  saline  Post-Treatment Procedures: Blood returned, Catheter Capped, clamped with Saline x2 ports  Machine Disinfection Process: Acid/Vinegar Clean, Heat Disinfect, Exterior Machine Disinfection  Rinseback Volume (ml): 300 ml  Blood Volume Processed (Liters): 77.7 l/min  Dialyzer Clearance: Heavily streaked  Duration of Treatment (minutes): 240 minutes Hemodialysis Intake (ml): 500 ml  Hemodialysis Output (ml): 3810 ml     Tolerated Treatment: Fair  Patient Response to Treatment: fair  Physician Notified: No       Provider Notification  Provider Notification  Reason for Communication: Review case (02/22/23 1630)  Provider Name: Dino Wood (02/22/23 1630)  Provider Notification: Physician (02/22/23 1630)  Method of Communication: Face to face (02/22/23 1630)  Response: At bedside (02/22/23 1630)  Notification Time: 1615 (02/22/23 1630)  Shift Event: Other (comment) (patient clotted last 10 min of treatment) (02/22/23 1630)     Handoff complete and report given to Primary RN at 1035 hours. Primary RN (First Initial, Last Name, Title):    DARREN Urias RN     Education  Person Educated: Patient   Knowledge Base: Minimal  Barriers to Learning?: None  Preferred method of Learning: Oral  Topic(s): Access Care, Signs and Symptoms of Infection, Fluid Management, and Medications   Teaching Tools: Explanation   Response to Education: Verbalized Understanding     Electronically signed by Zac Andujar LPN on 2/80/2987 at 0:44 PM

## 2023-03-02 NOTE — PROGRESS NOTES
Physician Progress Note      PATIENT:               Krzysztof Colunga  CSN #:                  [id number]  :                       1975  ADMIT DATE:       2023 8:03 AM  DISCH DATE:        2023 5:19 PM  RESPONDING  PROVIDER #:        Claudio De Oliveira MD          QUERY TEXT:    Pt admitted with volume overload and has CHF documented. If possible, please   document in progress notes and discharge summary further specificity regarding   the type and acuity of CHF:      The medical record reflects the following:  Risk Factors: fluid overload, ESRD, dependent on dialysis  Clinical Indicators: Pro-BNP 60445, physician orders on  state \" Volume   overload? new onset CHF\" Nephrology note on  states \" Acute on Chronic   CHF\" ECHO shows EF 55-60%  Treatment: IV lasix, labs, ECHO    Thank you,  Regina Mcmillan [phone]  Options provided:  -- Acute on Chronic Systolic CHF/HFrEF  -- Acute on Chronic Diastolic CHF/HFpEF  -- Acute on Chronic Systolic and Diastolic CHF  -- Acute Systolic CHF/HFrEF  -- Acute Diastolic CHF/HFpEF  -- Acute Systolic and Diastolic CHF  -- CHF ruled out  -- Other - I will add my own diagnosis  -- Disagree - Not applicable / Not valid  -- Disagree - Clinically unable to determine / Unknown  -- Refer to Clinical Documentation Reviewer    PROVIDER RESPONSE TEXT:    Patient may possibly have acute CHF with preserved ejection fraction in the   setting of possible DANIELA, LVH, and ESRD.     Query created by: Jonas Thorne on 2023 1:54 PM      Electronically signed by:  Claudio De Oliveira MD 3/2/2023 1:25 PM

## 2023-03-20 NOTE — PROGRESS NOTES
IR Procedure at [facility]:  Spoke with patient and he will arrive at 1000 at [facility] on 3/28/2023 for his procedure at 1130. Also went over below instructions. Patient will take his las dose of plavix and aspirin on 3/24/2023. States Felicia Astudillo was told to stop three days before procedure. \"     NPO at Midnight     (Paracentesis, Thoracentesis, Thyroid Bx and Injections may have a light breakfast)  2. Follow your directions as prescribed by the doctor for your procedure and medications. 3.   Consult your provider as to when to stop blood thinner  4. Do not take any pain medication within 6 hours of your procedure  5. Do not drink any alcoholic beverages or use any street drugs 24 hours before procedure. 6.   Please wear simple, loose fitting clothing to the hospital.  Do not bring valuables (money,             credit cards, checkbooks, etc.)     7. If you  have a Living Will and Durable Power of  for Healthcare, please bring in a copy. 8.   Please bring picture ID,  insurance card, paperwork from the doctors office            (H & P, Consent,  & card for implantable devices). 9.   Report to the information desk on the ground floor. 10. Take a shower the night before or morning of your procedure, do not apply any lotion, oil or powder. 11. If you are going to be sedated for the procedure, you will need a responsible adult to drive you home.

## 2023-03-28 ENCOUNTER — HOSPITAL ENCOUNTER (OUTPATIENT)
Dept: INTERVENTIONAL RADIOLOGY/VASCULAR | Age: 48
Discharge: HOME | End: 2023-03-28
Payer: MEDICARE

## 2023-03-28 VITALS
SYSTOLIC BLOOD PRESSURE: 135 MMHG | OXYGEN SATURATION: 99 % | BODY MASS INDEX: 41.47 KG/M2 | RESPIRATION RATE: 22 BRPM | HEART RATE: 87 BPM | DIASTOLIC BLOOD PRESSURE: 89 MMHG | HEIGHT: 69 IN | WEIGHT: 280 LBS

## 2023-03-28 DIAGNOSIS — N18.6 ESRD (END STAGE RENAL DISEASE) (HCC): ICD-10-CM

## 2023-03-28 LAB
APTT: 34.5 SECONDS (ref 25.1–37.1)
HCT VFR BLD CALC: 21.8 % (ref 42–52)
HEMOGLOBIN: 7 GM/DL (ref 13.5–18)
INR BLD: 0.97 INDEX
MCH RBC QN AUTO: 30.6 PG (ref 27–31)
MCHC RBC AUTO-ENTMCNC: 32.1 % (ref 32–36)
MCV RBC AUTO: 95.2 FL (ref 78–100)
PDW BLD-RTO: 13.7 % (ref 11.7–14.9)
PLATELET # BLD: 209 K/CU MM (ref 140–440)
PMV BLD AUTO: 10.3 FL (ref 7.5–11.1)
PROTHROMBIN TIME: 12.5 SECONDS (ref 11.7–14.5)
RBC # BLD: 2.29 M/CU MM (ref 4.6–6.2)
WBC # BLD: 8.4 K/CU MM (ref 4–10.5)

## 2023-03-28 PROCEDURE — 85027 COMPLETE CBC AUTOMATED: CPT

## 2023-03-28 PROCEDURE — 85610 PROTHROMBIN TIME: CPT

## 2023-03-28 PROCEDURE — 85730 THROMBOPLASTIN TIME PARTIAL: CPT

## 2023-03-28 PROCEDURE — 36589 REMOVAL TUNNELED CV CATH: CPT

## 2023-03-28 PROCEDURE — 7100000011 HC PHASE II RECOVERY - ADDTL 15 MIN

## 2023-03-28 PROCEDURE — 7100000010 HC PHASE II RECOVERY - FIRST 15 MIN

## 2023-03-28 PROCEDURE — 2709999900 IR REMOVE TUNNELED CVAD WO SQ PORT/PUMP

## 2023-03-28 ASSESSMENT — PAIN SCALES - GENERAL
PAINLEVEL_OUTOF10: 0
PAINLEVEL_OUTOF10: 0

## 2023-03-28 NOTE — OR NURSING
PROCEDURE PERFORMED: Remove Tunnel Hemodialysis Catheter    INFORMED CONSENT:  Obtained prior to procedure. Consent placed in chart. ASSESSMENT: Pt alert and oriented x4. Pt verbalizes understanding of procedure. BARRIER PRECAUTIONS & STERILE TECHNIQUE:               Pt transferred to the table and positioned for comfort. Warm blankets given. Pt placed on Vital Signs Monitor. Pt prepped and draped in a sterile fashion with chlorhexadine. INTRAOPERATIVE:             1034 - Tunnel catheter removed, tip intact. Terri Wade RT held pressure until hemostasis achieved.      STERILE DRESSINGS: 4x4 gauze and tegaderm    EBL:  Less than 1 cc     COMPLICATIONS: none    REPORT CALLED TO: Mireya Mejía RN at bedside by Dong Michaud negative...

## 2023-03-28 NOTE — PROGRESS NOTES
1041 Patient returned to room from IR,  A+Ox4,  VSS (see doc flow), assessment completed as per doc flow. Patient has no c/o pain,m and denies any needs at this time. CVC removal site WDL, no drainage/swelling noted. Beverage offered. Call light in reach, bed in low position. RN to continue to monitor. Patient to call family when ready to discharge. 1108 Patient discharge instructions reviewed and verified. RN reviewed d/c instructions with patient . All questions answered. Discharge paperwork signed by RN and patient. [phone] Discharged to car  via wheelchair, home with  spouse.

## 2023-03-28 NOTE — DISCHARGE INSTRUCTIONS
food. If you must care for your pet or be around animals while you are sick, wash your hands before and after you interact with pets and wear a facemask. Call ahead before visiting your doctor  If you have a medical appointment, call the healthcare provider and tell them that you have or may have COVID-19. This will help the healthcare providers office take steps to keep other people from getting infected or exposed. Wear a facemask  You should wear a facemask when you are around other people (e.g., sharing a room or vehicle) or pets and before you enter a healthcare providers office. If you are not able to wear a facemask (for example, because it causes trouble breathing), then people who live with you should not stay in the same room with you, or they should wear a facemask if they enter your room. Cover your coughs and sneezes  Cover your mouth and nose with a tissue when you cough or sneeze. Throw used tissues in a lined trash can. Immediately wash your hands with soap and water for at least 20 seconds or, if soap and water are not available, clean your hands with an alcohol-based hand  that contains at least 60% alcohol. Clean your hands often  Wash your hands often with soap and water for at least 20 seconds, especially after blowing your nose, coughing, or sneezing; going to the bathroom; and before eating or preparing food. If soap and water are not readily available, use an alcohol-based hand  with at least 60% alcohol, covering all surfaces of your hands and rubbing them together until they feel dry. Soap and water are the best option if hands are visibly dirty. Avoid touching your eyes, nose, and mouth with unwashed hands. Avoid sharing personal household items  You should not share dishes, drinking glasses, cups, eating utensils, towels, or bedding with other people or pets in your home. After using these items, they should be washed thoroughly with soap and water.   Clean all

## 2023-06-28 ENCOUNTER — APPOINTMENT (OUTPATIENT)
Dept: GENERAL RADIOLOGY | Age: 48
End: 2023-06-28
Payer: MEDICARE

## 2023-06-28 ENCOUNTER — APPOINTMENT (OUTPATIENT)
Dept: ULTRASOUND IMAGING | Age: 48
End: 2023-06-28
Payer: MEDICARE

## 2023-06-28 ENCOUNTER — HOSPITAL ENCOUNTER (EMERGENCY)
Age: 48
Discharge: HOME | End: 2023-06-28
Payer: MEDICARE

## 2023-06-28 VITALS
TEMPERATURE: 98.3 F | SYSTOLIC BLOOD PRESSURE: 168 MMHG | DIASTOLIC BLOOD PRESSURE: 92 MMHG | HEART RATE: 79 BPM | RESPIRATION RATE: 18 BRPM | OXYGEN SATURATION: 97 %

## 2023-06-28 DIAGNOSIS — M79.605 LEFT LEG PAIN: Primary | ICD-10-CM

## 2023-06-28 DIAGNOSIS — E11.621 DIABETIC ULCER OF OTHER PART OF LEFT FOOT ASSOCIATED WITH TYPE 2 DIABETES MELLITUS, LIMITED TO BREAKDOWN OF SKIN (HCC): ICD-10-CM

## 2023-06-28 DIAGNOSIS — L97.521 DIABETIC ULCER OF OTHER PART OF LEFT FOOT ASSOCIATED WITH TYPE 2 DIABETES MELLITUS, LIMITED TO BREAKDOWN OF SKIN (HCC): ICD-10-CM

## 2023-06-28 LAB
ALBUMIN SERPL-MCNC: 4 GM/DL (ref 3.4–5)
ALP BLD-CCNC: 85 IU/L (ref 40–129)
ALT SERPL-CCNC: 8 U/L (ref 10–40)
ANION GAP SERPL CALCULATED.3IONS-SCNC: 15 MMOL/L (ref 4–16)
AST SERPL-CCNC: 11 IU/L (ref 15–37)
BASOPHILS ABSOLUTE: 0.1 K/CU MM
BASOPHILS RELATIVE PERCENT: 0.6 % (ref 0–1)
BILIRUB SERPL-MCNC: 0.4 MG/DL (ref 0–1)
BUN SERPL-MCNC: 31 MG/DL (ref 6–23)
CALCIUM SERPL-MCNC: 7.4 MG/DL (ref 8.3–10.6)
CHLORIDE BLD-SCNC: 91 MMOL/L (ref 99–110)
CO2: 26 MMOL/L (ref 21–32)
CREAT SERPL-MCNC: 5.2 MG/DL (ref 0.9–1.3)
DIFFERENTIAL TYPE: ABNORMAL
EOSINOPHILS ABSOLUTE: 0.2 K/CU MM
EOSINOPHILS RELATIVE PERCENT: 1.4 % (ref 0–3)
GFR SERPL CREATININE-BSD FRML MDRD: 13 ML/MIN/1.73M2
GLUCOSE SERPL-MCNC: 170 MG/DL (ref 70–99)
HCT VFR BLD CALC: 34.8 % (ref 42–52)
HEMOGLOBIN: 11.1 GM/DL (ref 13.5–18)
IMMATURE NEUTROPHIL %: 0.6 % (ref 0–0.43)
LACTATE: 1.6 MMOL/L (ref 0.5–1.9)
LYMPHOCYTES ABSOLUTE: 1 K/CU MM
LYMPHOCYTES RELATIVE PERCENT: 9 % (ref 24–44)
MCH RBC QN AUTO: 29.5 PG (ref 27–31)
MCHC RBC AUTO-ENTMCNC: 31.9 % (ref 32–36)
MCV RBC AUTO: 92.6 FL (ref 78–100)
MONOCYTES ABSOLUTE: 0.5 K/CU MM
MONOCYTES RELATIVE PERCENT: 5 % (ref 0–4)
NUCLEATED RBC %: 0 %
PDW BLD-RTO: 15.2 % (ref 11.7–14.9)
PLATELET # BLD: 269 K/CU MM (ref 140–440)
PMV BLD AUTO: 9.9 FL (ref 7.5–11.1)
POTASSIUM SERPL-SCNC: 3.2 MMOL/L (ref 3.5–5.1)
RBC # BLD: 3.76 M/CU MM (ref 4.6–6.2)
SEGMENTED NEUTROPHILS ABSOLUTE COUNT: 9.1 K/CU MM
SEGMENTED NEUTROPHILS RELATIVE PERCENT: 83.4 % (ref 36–66)
SODIUM BLD-SCNC: 132 MMOL/L (ref 135–145)
TOTAL IMMATURE NEUTOROPHIL: 0.06 K/CU MM
TOTAL NUCLEATED RBC: 0 K/CU MM
TOTAL PROTEIN: 7.7 GM/DL (ref 6.4–8.2)
WBC # BLD: 10.9 K/CU MM (ref 4–10.5)

## 2023-06-28 PROCEDURE — 96372 THER/PROPH/DIAG INJ SC/IM: CPT

## 2023-06-28 PROCEDURE — 87040 BLOOD CULTURE FOR BACTERIA: CPT

## 2023-06-28 PROCEDURE — 93971 EXTREMITY STUDY: CPT

## 2023-06-28 PROCEDURE — 73630 X-RAY EXAM OF FOOT: CPT

## 2023-06-28 PROCEDURE — 6360000002 HC RX W HCPCS: Performed by: PHYSICIAN ASSISTANT

## 2023-06-28 PROCEDURE — 85025 COMPLETE CBC W/AUTO DIFF WBC: CPT

## 2023-06-28 PROCEDURE — 99284 EMERGENCY DEPT VISIT MOD MDM: CPT

## 2023-06-28 PROCEDURE — 80053 COMPREHEN METABOLIC PANEL: CPT

## 2023-06-28 PROCEDURE — 6370000000 HC RX 637 (ALT 250 FOR IP): Performed by: PHYSICIAN ASSISTANT

## 2023-06-28 PROCEDURE — 83605 ASSAY OF LACTIC ACID: CPT

## 2023-06-28 RX ORDER — CEPHALEXIN 500 MG/1
500 CAPSULE ORAL 4 TIMES DAILY
Qty: 28 CAPSULE | Refills: 0 | Status: SHIPPED | OUTPATIENT
Start: 2023-06-28 | End: 2023-07-05

## 2023-06-28 RX ORDER — SULFAMETHOXAZOLE AND TRIMETHOPRIM 800; 160 MG/1; MG/1
1 TABLET ORAL ONCE
Status: COMPLETED | OUTPATIENT
Start: 2023-06-28 | End: 2023-06-28

## 2023-06-28 RX ORDER — CEPHALEXIN 250 MG/1
500 CAPSULE ORAL ONCE
Status: COMPLETED | OUTPATIENT
Start: 2023-06-28 | End: 2023-06-28

## 2023-06-28 RX ORDER — SULFAMETHOXAZOLE AND TRIMETHOPRIM 800; 160 MG/1; MG/1
1 TABLET ORAL 2 TIMES DAILY
Qty: 14 TABLET | Refills: 0 | Status: SHIPPED | OUTPATIENT
Start: 2023-06-28 | End: 2023-07-05

## 2023-06-28 RX ORDER — MORPHINE SULFATE 4 MG/ML
4 INJECTION, SOLUTION INTRAMUSCULAR; INTRAVENOUS ONCE
Status: COMPLETED | OUTPATIENT
Start: 2023-06-28 | End: 2023-06-28

## 2023-06-28 RX ADMIN — CEPHALEXIN 500 MG: 250 CAPSULE ORAL at 21:45

## 2023-06-28 RX ADMIN — SULFAMETHOXAZOLE AND TRIMETHOPRIM 1 TABLET: 800; 160 TABLET ORAL at 21:45

## 2023-06-28 RX ADMIN — MORPHINE SULFATE 4 MG: 4 INJECTION, SOLUTION INTRAMUSCULAR; INTRAVENOUS at 21:05

## 2023-06-28 ASSESSMENT — PAIN DESCRIPTION - LOCATION: LOCATION: LEG;FOOT

## 2023-06-28 ASSESSMENT — PAIN DESCRIPTION - ORIENTATION: ORIENTATION: LEFT

## 2023-06-28 ASSESSMENT — PAIN SCALES - GENERAL: PAINLEVEL_OUTOF10: 8

## 2023-07-02 LAB
CULTURE: NORMAL
CULTURE: NORMAL
Lab: NORMAL
Lab: NORMAL
SPECIMEN: NORMAL
SPECIMEN: NORMAL

## 2023-07-03 LAB
CULTURE: NORMAL
CULTURE: NORMAL
Lab: NORMAL
Lab: NORMAL
SPECIMEN: NORMAL
SPECIMEN: NORMAL

## 2023-07-31 ENCOUNTER — HOSPITAL ENCOUNTER (INPATIENT)
Age: 48
LOS: 5 days | Discharge: HOME | DRG: 299 | End: 2023-08-06
Attending: EMERGENCY MEDICINE | Admitting: STUDENT IN AN ORGANIZED HEALTH CARE EDUCATION/TRAINING PROGRAM
Payer: MEDICARE

## 2023-07-31 DIAGNOSIS — N18.6 END STAGE RENAL DISEASE (HCC): ICD-10-CM

## 2023-07-31 DIAGNOSIS — E11.52 DIABETIC WET GANGRENE OF THE FOOT (HCC): Primary | ICD-10-CM

## 2023-07-31 PROCEDURE — 99285 EMERGENCY DEPT VISIT HI MDM: CPT

## 2023-07-31 PROCEDURE — 87075 CULTR BACTERIA EXCEPT BLOOD: CPT

## 2023-07-31 PROCEDURE — 87147 CULTURE TYPE IMMUNOLOGIC: CPT

## 2023-07-31 PROCEDURE — 82248 BILIRUBIN DIRECT: CPT

## 2023-07-31 PROCEDURE — 87077 CULTURE AEROBIC IDENTIFY: CPT

## 2023-07-31 PROCEDURE — 85025 COMPLETE CBC W/AUTO DIFF WBC: CPT

## 2023-07-31 PROCEDURE — 85610 PROTHROMBIN TIME: CPT

## 2023-07-31 PROCEDURE — 83605 ASSAY OF LACTIC ACID: CPT

## 2023-07-31 PROCEDURE — 96366 THER/PROPH/DIAG IV INF ADDON: CPT

## 2023-07-31 PROCEDURE — 96367 TX/PROPH/DG ADDL SEQ IV INF: CPT

## 2023-07-31 PROCEDURE — 87040 BLOOD CULTURE FOR BACTERIA: CPT

## 2023-07-31 PROCEDURE — 85730 THROMBOPLASTIN TIME PARTIAL: CPT

## 2023-07-31 PROCEDURE — 87186 SC STD MICRODIL/AGAR DIL: CPT

## 2023-07-31 PROCEDURE — 2580000003 HC RX 258: Performed by: EMERGENCY MEDICINE

## 2023-07-31 PROCEDURE — 96365 THER/PROPH/DIAG IV INF INIT: CPT

## 2023-07-31 PROCEDURE — 96375 TX/PRO/DX INJ NEW DRUG ADDON: CPT

## 2023-07-31 PROCEDURE — 80053 COMPREHEN METABOLIC PANEL: CPT

## 2023-07-31 PROCEDURE — 87070 CULTURE OTHR SPECIMN AEROBIC: CPT

## 2023-07-31 RX ORDER — FENTANYL CITRATE 50 UG/ML
25 INJECTION, SOLUTION INTRAMUSCULAR; INTRAVENOUS ONCE
Status: COMPLETED | OUTPATIENT
Start: 2023-07-31 | End: 2023-08-01

## 2023-07-31 RX ORDER — 0.9 % SODIUM CHLORIDE 0.9 %
500 INTRAVENOUS SOLUTION INTRAVENOUS ONCE
Status: COMPLETED | OUTPATIENT
Start: 2023-08-01 | End: 2023-08-01

## 2023-07-31 RX ORDER — ONDANSETRON 2 MG/ML
8 INJECTION INTRAMUSCULAR; INTRAVENOUS ONCE
Status: COMPLETED | OUTPATIENT
Start: 2023-07-31 | End: 2023-08-01

## 2023-07-31 RX ADMIN — SODIUM CHLORIDE 500 ML: 9 INJECTION, SOLUTION INTRAVENOUS at 23:57

## 2023-08-01 ENCOUNTER — APPOINTMENT (OUTPATIENT)
Dept: CT IMAGING | Age: 48
DRG: 299 | End: 2023-08-01
Payer: MEDICARE

## 2023-08-01 PROBLEM — I96 GANGRENE (HCC): Status: ACTIVE | Noted: 2023-08-01

## 2023-08-01 PROBLEM — L97.523 DIABETIC ULCER OF TOE OF LEFT FOOT ASSOCIATED WITH TYPE 2 DIABETES MELLITUS, WITH NECROSIS OF MUSCLE (HCC): Status: ACTIVE | Noted: 2022-09-01

## 2023-08-01 LAB
ALBUMIN SERPL-MCNC: 3.8 GM/DL (ref 3.4–5)
ALP BLD-CCNC: 77 IU/L (ref 40–129)
ALT SERPL-CCNC: 7 U/L (ref 10–40)
ANION GAP SERPL CALCULATED.3IONS-SCNC: 19 MMOL/L (ref 4–16)
APTT: 33.2 SECONDS (ref 25.1–37.1)
AST SERPL-CCNC: 9 IU/L (ref 15–37)
BASOPHILS ABSOLUTE: 0.1 K/CU MM
BASOPHILS RELATIVE PERCENT: 0.5 % (ref 0–1)
BILIRUB SERPL-MCNC: 0.4 MG/DL (ref 0–1)
BILIRUBIN DIRECT: 0.2 MG/DL (ref 0–0.3)
BILIRUBIN, INDIRECT: 0.2 MG/DL (ref 0–0.7)
BUN SERPL-MCNC: 59 MG/DL (ref 6–23)
CALCIUM SERPL-MCNC: 7.5 MG/DL (ref 8.3–10.6)
CHLORIDE BLD-SCNC: 92 MMOL/L (ref 99–110)
CO2: 25 MMOL/L (ref 21–32)
CREAT SERPL-MCNC: 7.5 MG/DL (ref 0.9–1.3)
DIFFERENTIAL TYPE: ABNORMAL
EOSINOPHILS ABSOLUTE: 0.2 K/CU MM
EOSINOPHILS RELATIVE PERCENT: 2.1 % (ref 0–3)
GFR SERPL CREATININE-BSD FRML MDRD: 8 ML/MIN/1.73M2
GLUCOSE BLD-MCNC: 122 MG/DL (ref 70–99)
GLUCOSE BLD-MCNC: 137 MG/DL (ref 70–99)
GLUCOSE BLD-MCNC: 156 MG/DL (ref 70–99)
GLUCOSE BLD-MCNC: 164 MG/DL (ref 70–99)
GLUCOSE BLD-MCNC: 182 MG/DL (ref 70–99)
GLUCOSE SERPL-MCNC: 173 MG/DL (ref 70–99)
HCT VFR BLD CALC: 32.2 % (ref 42–52)
HEMOGLOBIN: 10.3 GM/DL (ref 13.5–18)
IMMATURE NEUTROPHIL %: 0.5 % (ref 0–0.43)
INR BLD: 1.2 INDEX
LACTIC ACID, SEPSIS: 1.3 MMOL/L (ref 0.5–1.9)
LACTIC ACID, SEPSIS: 2.1 MMOL/L (ref 0.5–1.9)
LYMPHOCYTES ABSOLUTE: 1.2 K/CU MM
LYMPHOCYTES RELATIVE PERCENT: 11.8 % (ref 24–44)
MCH RBC QN AUTO: 29.3 PG (ref 27–31)
MCHC RBC AUTO-ENTMCNC: 32 % (ref 32–36)
MCV RBC AUTO: 91.5 FL (ref 78–100)
MONOCYTES ABSOLUTE: 0.7 K/CU MM
MONOCYTES RELATIVE PERCENT: 6.6 % (ref 0–4)
NUCLEATED RBC %: 0 %
PDW BLD-RTO: 15.5 % (ref 11.7–14.9)
PLATELET # BLD: 272 K/CU MM (ref 140–440)
PMV BLD AUTO: 10.2 FL (ref 7.5–11.1)
POTASSIUM SERPL-SCNC: 4.3 MMOL/L (ref 3.5–5.1)
PROTHROMBIN TIME: 15.2 SECONDS (ref 11.7–14.5)
RBC # BLD: 3.52 M/CU MM (ref 4.6–6.2)
SEGMENTED NEUTROPHILS ABSOLUTE COUNT: 8 K/CU MM
SEGMENTED NEUTROPHILS RELATIVE PERCENT: 78.5 % (ref 36–66)
SODIUM BLD-SCNC: 136 MMOL/L (ref 135–145)
TOTAL IMMATURE NEUTOROPHIL: 0.05 K/CU MM
TOTAL NUCLEATED RBC: 0 K/CU MM
TOTAL PROTEIN: 8.2 GM/DL (ref 6.4–8.2)
WBC # BLD: 10.2 K/CU MM (ref 4–10.5)

## 2023-08-01 PROCEDURE — 99211 OFF/OP EST MAY X REQ PHY/QHP: CPT

## 2023-08-01 PROCEDURE — 93005 ELECTROCARDIOGRAM TRACING: CPT | Performed by: STUDENT IN AN ORGANIZED HEALTH CARE EDUCATION/TRAINING PROGRAM

## 2023-08-01 PROCEDURE — 94761 N-INVAS EAR/PLS OXIMETRY MLT: CPT

## 2023-08-01 PROCEDURE — 6360000002 HC RX W HCPCS: Performed by: STUDENT IN AN ORGANIZED HEALTH CARE EDUCATION/TRAINING PROGRAM

## 2023-08-01 PROCEDURE — 99223 1ST HOSP IP/OBS HIGH 75: CPT | Performed by: INTERNAL MEDICINE

## 2023-08-01 PROCEDURE — 73701 CT LOWER EXTREMITY W/DYE: CPT

## 2023-08-01 PROCEDURE — 2580000003 HC RX 258: Performed by: EMERGENCY MEDICINE

## 2023-08-01 PROCEDURE — 6370000000 HC RX 637 (ALT 250 FOR IP): Performed by: STUDENT IN AN ORGANIZED HEALTH CARE EDUCATION/TRAINING PROGRAM

## 2023-08-01 PROCEDURE — 6360000002 HC RX W HCPCS: Performed by: EMERGENCY MEDICINE

## 2023-08-01 PROCEDURE — 1200000000 HC SEMI PRIVATE

## 2023-08-01 PROCEDURE — 6360000004 HC RX CONTRAST MEDICATION: Performed by: EMERGENCY MEDICINE

## 2023-08-01 PROCEDURE — 82962 GLUCOSE BLOOD TEST: CPT

## 2023-08-01 PROCEDURE — 87040 BLOOD CULTURE FOR BACTERIA: CPT

## 2023-08-01 PROCEDURE — 83605 ASSAY OF LACTIC ACID: CPT

## 2023-08-01 PROCEDURE — 2580000003 HC RX 258: Performed by: STUDENT IN AN ORGANIZED HEALTH CARE EDUCATION/TRAINING PROGRAM

## 2023-08-01 PROCEDURE — 6370000000 HC RX 637 (ALT 250 FOR IP): Performed by: SURGERY

## 2023-08-01 RX ORDER — HEPARIN SODIUM 5000 [USP'U]/ML
5000 INJECTION, SOLUTION INTRAVENOUS; SUBCUTANEOUS EVERY 8 HOURS SCHEDULED
Status: DISCONTINUED | OUTPATIENT
Start: 2023-08-01 | End: 2023-08-06 | Stop reason: HOSPADM

## 2023-08-01 RX ORDER — SODIUM CHLORIDE 9 MG/ML
INJECTION, SOLUTION INTRAVENOUS PRN
Status: DISCONTINUED | OUTPATIENT
Start: 2023-08-01 | End: 2023-08-06 | Stop reason: HOSPADM

## 2023-08-01 RX ORDER — SODIUM CHLORIDE 0.9 % (FLUSH) 0.9 %
5-40 SYRINGE (ML) INJECTION EVERY 12 HOURS SCHEDULED
Status: DISCONTINUED | OUTPATIENT
Start: 2023-08-01 | End: 2023-08-06 | Stop reason: HOSPADM

## 2023-08-01 RX ORDER — ACETAMINOPHEN 650 MG/1
650 SUPPOSITORY RECTAL EVERY 6 HOURS PRN
Status: DISCONTINUED | OUTPATIENT
Start: 2023-08-01 | End: 2023-08-06 | Stop reason: HOSPADM

## 2023-08-01 RX ORDER — POLYETHYLENE GLYCOL 3350 17 G/17G
17 POWDER, FOR SOLUTION ORAL DAILY PRN
Status: DISCONTINUED | OUTPATIENT
Start: 2023-08-01 | End: 2023-08-06 | Stop reason: HOSPADM

## 2023-08-01 RX ORDER — DEXTROSE MONOHYDRATE 100 MG/ML
INJECTION, SOLUTION INTRAVENOUS CONTINUOUS PRN
Status: DISCONTINUED | OUTPATIENT
Start: 2023-08-01 | End: 2023-08-06 | Stop reason: HOSPADM

## 2023-08-01 RX ORDER — ONDANSETRON 2 MG/ML
4 INJECTION INTRAMUSCULAR; INTRAVENOUS EVERY 6 HOURS PRN
Status: DISCONTINUED | OUTPATIENT
Start: 2023-08-01 | End: 2023-08-06 | Stop reason: HOSPADM

## 2023-08-01 RX ORDER — INSULIN LISPRO 100 [IU]/ML
0-4 INJECTION, SOLUTION INTRAVENOUS; SUBCUTANEOUS NIGHTLY
Status: DISCONTINUED | OUTPATIENT
Start: 2023-08-01 | End: 2023-08-06 | Stop reason: HOSPADM

## 2023-08-01 RX ORDER — ONDANSETRON 4 MG/1
4 TABLET, ORALLY DISINTEGRATING ORAL EVERY 8 HOURS PRN
Status: DISCONTINUED | OUTPATIENT
Start: 2023-08-01 | End: 2023-08-06 | Stop reason: HOSPADM

## 2023-08-01 RX ORDER — GLUCAGON 1 MG/ML
1 KIT INJECTION PRN
Status: DISCONTINUED | OUTPATIENT
Start: 2023-08-01 | End: 2023-08-06 | Stop reason: HOSPADM

## 2023-08-01 RX ORDER — SODIUM CHLORIDE 0.9 % (FLUSH) 0.9 %
5-40 SYRINGE (ML) INJECTION PRN
Status: DISCONTINUED | OUTPATIENT
Start: 2023-08-01 | End: 2023-08-06 | Stop reason: HOSPADM

## 2023-08-01 RX ORDER — LINEZOLID 2 MG/ML
600 INJECTION, SOLUTION INTRAVENOUS EVERY 12 HOURS
Status: DISCONTINUED | OUTPATIENT
Start: 2023-08-01 | End: 2023-08-03

## 2023-08-01 RX ORDER — OXYCODONE AND ACETAMINOPHEN 7.5; 325 MG/1; MG/1
1 TABLET ORAL EVERY 8 HOURS PRN
Status: DISCONTINUED | OUTPATIENT
Start: 2023-08-01 | End: 2023-08-06 | Stop reason: HOSPADM

## 2023-08-01 RX ORDER — ATORVASTATIN CALCIUM 40 MG/1
40 TABLET, FILM COATED ORAL NIGHTLY
Status: DISCONTINUED | OUTPATIENT
Start: 2023-08-01 | End: 2023-08-06 | Stop reason: HOSPADM

## 2023-08-01 RX ORDER — ASPIRIN 81 MG/1
81 TABLET, CHEWABLE ORAL DAILY
Status: DISCONTINUED | OUTPATIENT
Start: 2023-08-01 | End: 2023-08-06 | Stop reason: HOSPADM

## 2023-08-01 RX ORDER — INSULIN LISPRO 100 [IU]/ML
0-4 INJECTION, SOLUTION INTRAVENOUS; SUBCUTANEOUS
Status: DISCONTINUED | OUTPATIENT
Start: 2023-08-01 | End: 2023-08-06 | Stop reason: HOSPADM

## 2023-08-01 RX ORDER — ACETAMINOPHEN 325 MG/1
650 TABLET ORAL EVERY 6 HOURS PRN
Status: DISCONTINUED | OUTPATIENT
Start: 2023-08-01 | End: 2023-08-06 | Stop reason: HOSPADM

## 2023-08-01 RX ORDER — CARVEDILOL 25 MG/1
25 TABLET ORAL 2 TIMES DAILY WITH MEALS
Status: DISCONTINUED | OUTPATIENT
Start: 2023-08-01 | End: 2023-08-06 | Stop reason: HOSPADM

## 2023-08-01 RX ORDER — ARIPIPRAZOLE 10 MG/1
5 TABLET ORAL NIGHTLY
Status: DISCONTINUED | OUTPATIENT
Start: 2023-08-01 | End: 2023-08-06 | Stop reason: HOSPADM

## 2023-08-01 RX ADMIN — COLLAGENASE SANTYL: 250 OINTMENT TOPICAL at 17:54

## 2023-08-01 RX ADMIN — VANCOMYCIN HYDROCHLORIDE 1750 MG: 5 INJECTION, POWDER, LYOPHILIZED, FOR SOLUTION INTRAVENOUS at 01:05

## 2023-08-01 RX ADMIN — OXYCODONE AND ACETAMINOPHEN 1 TABLET: 7.5; 325 TABLET ORAL at 12:08

## 2023-08-01 RX ADMIN — LINEZOLID 600 MG: 600 INJECTION, SOLUTION INTRAVENOUS at 21:13

## 2023-08-01 RX ADMIN — LINEZOLID 600 MG: 600 INJECTION, SOLUTION INTRAVENOUS at 12:06

## 2023-08-01 RX ADMIN — CEFEPIME 2000 MG: 2 INJECTION, POWDER, FOR SOLUTION INTRAVENOUS at 12:03

## 2023-08-01 RX ADMIN — HEPARIN SODIUM 5000 UNITS: 5000 INJECTION INTRAVENOUS; SUBCUTANEOUS at 06:34

## 2023-08-01 RX ADMIN — ONDANSETRON 8 MG: 2 INJECTION INTRAMUSCULAR; INTRAVENOUS at 00:00

## 2023-08-01 RX ADMIN — SODIUM CHLORIDE, PRESERVATIVE FREE 10 ML: 5 INJECTION INTRAVENOUS at 21:12

## 2023-08-01 RX ADMIN — HYDROMORPHONE HYDROCHLORIDE 0.25 MG: 1 INJECTION, SOLUTION INTRAMUSCULAR; INTRAVENOUS; SUBCUTANEOUS at 18:25

## 2023-08-01 RX ADMIN — ATORVASTATIN CALCIUM 40 MG: 40 TABLET, FILM COATED ORAL at 20:52

## 2023-08-01 RX ADMIN — OXYCODONE AND ACETAMINOPHEN 1 TABLET: 7.5; 325 TABLET ORAL at 20:53

## 2023-08-01 RX ADMIN — FENTANYL CITRATE 25 MCG: 50 INJECTION, SOLUTION INTRAMUSCULAR; INTRAVENOUS at 00:01

## 2023-08-01 RX ADMIN — PIPERACILLIN AND TAZOBACTAM 2250 MG: 2; .25 INJECTION, POWDER, LYOPHILIZED, FOR SOLUTION INTRAVENOUS at 00:16

## 2023-08-01 RX ADMIN — CARVEDILOL 25 MG: 25 TABLET, FILM COATED ORAL at 17:54

## 2023-08-01 RX ADMIN — SODIUM CHLORIDE, PRESERVATIVE FREE 10 ML: 5 INJECTION INTRAVENOUS at 11:59

## 2023-08-01 RX ADMIN — IOPAMIDOL 75 ML: 755 INJECTION, SOLUTION INTRAVENOUS at 00:40

## 2023-08-01 RX ADMIN — HEPARIN SODIUM 5000 UNITS: 5000 INJECTION INTRAVENOUS; SUBCUTANEOUS at 17:55

## 2023-08-01 ASSESSMENT — PAIN DESCRIPTION - DESCRIPTORS: DESCRIPTORS: SHARP

## 2023-08-01 ASSESSMENT — PAIN SCALES - GENERAL
PAINLEVEL_OUTOF10: 7
PAINLEVEL_OUTOF10: 6
PAINLEVEL_OUTOF10: 7
PAINLEVEL_OUTOF10: 10

## 2023-08-01 ASSESSMENT — PAIN DESCRIPTION - ORIENTATION
ORIENTATION: LEFT

## 2023-08-01 ASSESSMENT — PAIN DESCRIPTION - LOCATION
LOCATION: FOOT
LOCATION: FOOT;LEG

## 2023-08-01 NOTE — ED PROVIDER NOTES
tests: reviewed  Decide to obtain previous medical records or to obtain history from someone other than the patient: yes       -  Patient seen and evaluated in the emergency department. -  Triage and nursing notes reviewed and incorporated. -  Old chart records reviewed and incorporated. -  Work-up included:  See above      Appropriate PPE utilized as indicated for entire patient encounter? Time of Disposition: See timeline      Independent Imaging Interpretation by me: None     EKG (if obtained): None     Chronic conditions affecting care: End-stage renal disease, diabetes, chronic wounds     Discussion with Other Profesionals : Dr. Thao Sparks of hospitalist team       Social Determinants : None          I am the Primary Clinician of Record. ?  New Prescriptions    No medications on file     FINAL IMPRESSION  1. Diabetic wet gangrene of the foot ([facility])    2. End stage renal disease [facility])        Electronically signed by:  [facility], 8/1/2023         [facility]  08/01/23 9666

## 2023-08-01 NOTE — H&P
subcutaneous edema in the upper calf down through the ankle. Steady increase of the degree of edema and non loculated fluid around the ankle and into the proximal foot. There is no soft tissue emphysema at the calf. No radiopaque foreign bodies. 1.  Subcutaneous edema in the calf with steady worsening around the ankle and into the foot. No loculated fluid collection and no soft tissue emphysema. There is some fatty atrophy of the posterior musculature but no abscess or hematoma. 2.  No acute fracture, cortical erosion, or periosteal reaction at the tibia and fibula. The alignment of the knee and ankle are maintained. CT FOOT LEFT W CONTRAST    Result Date: 8/1/2023  EXAMINATION: CT OF THE LEFT FOOT WITH CONTRAST 8/1/2023 12:41 am TECHNIQUE: CT of the left foot was performed with the administration of intravenous contrast.  Multiplanar reformatted images are provided for review. Automated exposure control, iterative reconstruction, and/or weight based adjustment of the mA/kV was utilized to reduce the radiation dose to as low as reasonably achievable. COMPARISON: None. HISTORY ORDERING SYSTEM PROVIDED HISTORY: Ulcerative wound to plantar aspect of first metatarsal with surrounding erythema and induration, evaluate for abscess/necrotizing soft tissue infection TECHNOLOGIST PROVIDED HISTORY: Reason for exam:->Ulcerative wound to plantar aspect of first metatarsal with surrounding erythema and induration, evaluate for abscess/necrotizing soft tissue infection Decision Support Exception - unselect if not a suspected or confirmed emergency medical condition->Emergency Medical Condition (MA) Reason for Exam: Ulcerative wound to plantar aspect of first metatarsal with surrounding erythema and induration, evaluate for abscess/necrotizing soft tissue infection FINDINGS: Bones: Margins of the ankle are intact. There is no fracture deformity, collapse, or destruction at the talus. Fused os trigonum is noted.

## 2023-08-02 ENCOUNTER — APPOINTMENT (OUTPATIENT)
Dept: MRI IMAGING | Age: 48
DRG: 299 | End: 2023-08-02
Payer: MEDICARE

## 2023-08-02 LAB
ANION GAP SERPL CALCULATED.3IONS-SCNC: 18 MMOL/L (ref 4–16)
BASOPHILS ABSOLUTE: 0 K/CU MM
BASOPHILS RELATIVE PERCENT: 0.5 % (ref 0–1)
BUN SERPL-MCNC: 72 MG/DL (ref 6–23)
CALCIUM IONIZED: 4 MG/DL (ref 4.48–5.28)
CALCIUM SERPL-MCNC: 6.1 MG/DL (ref 8.3–10.6)
CALCIUM SERPL-MCNC: 8.1 MG/DL (ref 8.3–10.6)
CHLORIDE BLD-SCNC: 96 MMOL/L (ref 99–110)
CO2: 21 MMOL/L (ref 21–32)
CREAT SERPL-MCNC: 9.4 MG/DL (ref 0.9–1.3)
DIFFERENTIAL TYPE: ABNORMAL
EOSINOPHILS ABSOLUTE: 0.2 K/CU MM
EOSINOPHILS RELATIVE PERCENT: 3.3 % (ref 0–3)
GFR SERPL CREATININE-BSD FRML MDRD: 6 ML/MIN/1.73M2
GLUCOSE BLD-MCNC: 130 MG/DL (ref 70–99)
GLUCOSE BLD-MCNC: 161 MG/DL (ref 70–99)
GLUCOSE BLD-MCNC: 212 MG/DL (ref 70–99)
GLUCOSE BLD-MCNC: 246 MG/DL (ref 70–99)
GLUCOSE SERPL-MCNC: 142 MG/DL (ref 70–99)
HBV SURFACE AB SERPL IA-ACNC: <3.5 M[IU]/ML
HBV SURFACE AG SERPL QL IA: NON REACTIVE
HCT VFR BLD CALC: 27.9 % (ref 42–52)
HEMOGLOBIN: 8.9 GM/DL (ref 13.5–18)
IMMATURE NEUTROPHIL %: 0.3 % (ref 0–0.43)
INR BLD: 1.1 INDEX
IONIZED CA: 1 MMOL/L (ref 1.12–1.32)
LYMPHOCYTES ABSOLUTE: 0.7 K/CU MM
LYMPHOCYTES RELATIVE PERCENT: 10.8 % (ref 24–44)
MAGNESIUM: 2.4 MG/DL (ref 1.8–2.4)
MCH RBC QN AUTO: 30.1 PG (ref 27–31)
MCHC RBC AUTO-ENTMCNC: 31.9 % (ref 32–36)
MCV RBC AUTO: 94.3 FL (ref 78–100)
MONOCYTES ABSOLUTE: 0.6 K/CU MM
MONOCYTES RELATIVE PERCENT: 9.5 % (ref 0–4)
NUCLEATED RBC %: 0 %
PDW BLD-RTO: 15.6 % (ref 11.7–14.9)
PHOSPHORUS: 9.4 MG/DL (ref 2.5–4.9)
PLATELET # BLD: 212 K/CU MM (ref 140–440)
PMV BLD AUTO: 9.9 FL (ref 7.5–11.1)
POTASSIUM SERPL-SCNC: 5 MMOL/L (ref 3.5–5.1)
PROTHROMBIN TIME: 14.7 SECONDS (ref 11.7–14.5)
RBC # BLD: 2.96 M/CU MM (ref 4.6–6.2)
SEGMENTED NEUTROPHILS ABSOLUTE COUNT: 5 K/CU MM
SEGMENTED NEUTROPHILS RELATIVE PERCENT: 75.6 % (ref 36–66)
SODIUM BLD-SCNC: 135 MMOL/L (ref 135–145)
TOTAL IMMATURE NEUTOROPHIL: 0.02 K/CU MM
TOTAL NUCLEATED RBC: 0 K/CU MM
WBC # BLD: 6.7 K/CU MM (ref 4–10.5)

## 2023-08-02 PROCEDURE — 90935 HEMODIALYSIS ONE EVALUATION: CPT

## 2023-08-02 PROCEDURE — 84100 ASSAY OF PHOSPHORUS: CPT

## 2023-08-02 PROCEDURE — 2500000003 HC RX 250 WO HCPCS: Performed by: INTERNAL MEDICINE

## 2023-08-02 PROCEDURE — 6360000002 HC RX W HCPCS: Performed by: STUDENT IN AN ORGANIZED HEALTH CARE EDUCATION/TRAINING PROGRAM

## 2023-08-02 PROCEDURE — 99233 SBSQ HOSP IP/OBS HIGH 50: CPT | Performed by: INTERNAL MEDICINE

## 2023-08-02 PROCEDURE — 36415 COLL VENOUS BLD VENIPUNCTURE: CPT

## 2023-08-02 PROCEDURE — 73718 MRI LOWER EXTREMITY W/O DYE: CPT

## 2023-08-02 PROCEDURE — 82962 GLUCOSE BLOOD TEST: CPT

## 2023-08-02 PROCEDURE — 5A1D70Z PERFORMANCE OF URINARY FILTRATION, INTERMITTENT, LESS THAN 6 HOURS PER DAY: ICD-10-PCS | Performed by: PHYSICIAN ASSISTANT

## 2023-08-02 PROCEDURE — 1200000000 HC SEMI PRIVATE

## 2023-08-02 PROCEDURE — 6370000000 HC RX 637 (ALT 250 FOR IP): Performed by: INTERNAL MEDICINE

## 2023-08-02 PROCEDURE — 2580000003 HC RX 258: Performed by: STUDENT IN AN ORGANIZED HEALTH CARE EDUCATION/TRAINING PROGRAM

## 2023-08-02 PROCEDURE — 82310 ASSAY OF CALCIUM: CPT

## 2023-08-02 PROCEDURE — 86706 HEP B SURFACE ANTIBODY: CPT

## 2023-08-02 PROCEDURE — 85610 PROTHROMBIN TIME: CPT

## 2023-08-02 PROCEDURE — 87340 HEPATITIS B SURFACE AG IA: CPT

## 2023-08-02 PROCEDURE — 6360000002 HC RX W HCPCS: Performed by: INTERNAL MEDICINE

## 2023-08-02 PROCEDURE — 83735 ASSAY OF MAGNESIUM: CPT

## 2023-08-02 PROCEDURE — 82330 ASSAY OF CALCIUM: CPT

## 2023-08-02 PROCEDURE — 80048 BASIC METABOLIC PNL TOTAL CA: CPT

## 2023-08-02 PROCEDURE — 6370000000 HC RX 637 (ALT 250 FOR IP): Performed by: STUDENT IN AN ORGANIZED HEALTH CARE EDUCATION/TRAINING PROGRAM

## 2023-08-02 PROCEDURE — 94761 N-INVAS EAR/PLS OXIMETRY MLT: CPT

## 2023-08-02 PROCEDURE — 2580000003 HC RX 258: Performed by: INTERNAL MEDICINE

## 2023-08-02 PROCEDURE — 85025 COMPLETE CBC W/AUTO DIFF WBC: CPT

## 2023-08-02 RX ORDER — CALCIUM ACETATE 667 MG/1
1 CAPSULE ORAL
Status: DISCONTINUED | OUTPATIENT
Start: 2023-08-02 | End: 2023-08-06 | Stop reason: HOSPADM

## 2023-08-02 RX ORDER — CALCIUM GLUCONATE 20 MG/ML
2000 INJECTION, SOLUTION INTRAVENOUS ONCE
Status: DISCONTINUED | OUTPATIENT
Start: 2023-08-02 | End: 2023-08-02

## 2023-08-02 RX ORDER — CALCITRIOL 0.25 UG/1
0.25 CAPSULE, LIQUID FILLED ORAL DAILY
Status: DISCONTINUED | OUTPATIENT
Start: 2023-08-02 | End: 2023-08-06 | Stop reason: HOSPADM

## 2023-08-02 RX ADMIN — HEPARIN SODIUM 5000 UNITS: 5000 INJECTION INTRAVENOUS; SUBCUTANEOUS at 18:30

## 2023-08-02 RX ADMIN — HYDROMORPHONE HYDROCHLORIDE 0.25 MG: 1 INJECTION, SOLUTION INTRAMUSCULAR; INTRAVENOUS; SUBCUTANEOUS at 18:31

## 2023-08-02 RX ADMIN — OXYCODONE AND ACETAMINOPHEN 1 TABLET: 7.5; 325 TABLET ORAL at 13:14

## 2023-08-02 RX ADMIN — HEPARIN SODIUM 5000 UNITS: 5000 INJECTION INTRAVENOUS; SUBCUTANEOUS at 00:24

## 2023-08-02 RX ADMIN — OXYCODONE AND ACETAMINOPHEN 1 TABLET: 7.5; 325 TABLET ORAL at 05:46

## 2023-08-02 RX ADMIN — OXYCODONE AND ACETAMINOPHEN 1 TABLET: 7.5; 325 TABLET ORAL at 22:29

## 2023-08-02 RX ADMIN — ATORVASTATIN CALCIUM 40 MG: 40 TABLET, FILM COATED ORAL at 22:29

## 2023-08-02 RX ADMIN — CALCIUM ACETATE 667 MG: 667 CAPSULE ORAL at 13:13

## 2023-08-02 RX ADMIN — CARVEDILOL 25 MG: 25 TABLET, FILM COATED ORAL at 18:31

## 2023-08-02 RX ADMIN — SODIUM CHLORIDE, PRESERVATIVE FREE 10 ML: 5 INJECTION INTRAVENOUS at 13:14

## 2023-08-02 RX ADMIN — CALCIUM ACETATE 667 MG: 667 CAPSULE ORAL at 18:31

## 2023-08-02 RX ADMIN — LINEZOLID 600 MG: 600 INJECTION, SOLUTION INTRAVENOUS at 13:29

## 2023-08-02 RX ADMIN — ASPIRIN 81 MG: 81 TABLET, CHEWABLE ORAL at 13:14

## 2023-08-02 RX ADMIN — PIPERACILLIN AND TAZOBACTAM 4500 MG: 4; .5 INJECTION, POWDER, LYOPHILIZED, FOR SOLUTION INTRAVENOUS; PARENTERAL at 18:36

## 2023-08-02 RX ADMIN — HYDROMORPHONE HYDROCHLORIDE 0.25 MG: 1 INJECTION, SOLUTION INTRAMUSCULAR; INTRAVENOUS; SUBCUTANEOUS at 00:30

## 2023-08-02 RX ADMIN — ONDANSETRON 4 MG: 2 INJECTION INTRAMUSCULAR; INTRAVENOUS at 08:30

## 2023-08-02 RX ADMIN — CALCITRIOL CAPSULES 0.25 MCG 0.25 MCG: 0.25 CAPSULE ORAL at 13:13

## 2023-08-02 RX ADMIN — ONDANSETRON 4 MG: 2 INJECTION INTRAMUSCULAR; INTRAVENOUS at 17:01

## 2023-08-02 RX ADMIN — INSULIN LISPRO 1 UNITS: 100 INJECTION, SOLUTION INTRAVENOUS; SUBCUTANEOUS at 17:02

## 2023-08-02 RX ADMIN — ONDANSETRON 4 MG: 2 INJECTION INTRAMUSCULAR; INTRAVENOUS at 00:39

## 2023-08-02 ASSESSMENT — PAIN DESCRIPTION - PAIN TYPE
TYPE: SURGICAL PAIN
TYPE: SURGICAL PAIN

## 2023-08-02 ASSESSMENT — PAIN DESCRIPTION - DESCRIPTORS
DESCRIPTORS: ACHING;THROBBING
DESCRIPTORS: THROBBING
DESCRIPTORS: ACHING;THROBBING
DESCRIPTORS: THROBBING
DESCRIPTORS: ACHING;THROBBING

## 2023-08-02 ASSESSMENT — PAIN - FUNCTIONAL ASSESSMENT
PAIN_FUNCTIONAL_ASSESSMENT: ACTIVITIES ARE NOT PREVENTED
PAIN_FUNCTIONAL_ASSESSMENT: PREVENTS OR INTERFERES SOME ACTIVE ACTIVITIES AND ADLS

## 2023-08-02 ASSESSMENT — PAIN DESCRIPTION - ORIENTATION
ORIENTATION: LEFT
ORIENTATION: LEFT;LOWER
ORIENTATION: LEFT
ORIENTATION: LOWER;LEFT

## 2023-08-02 ASSESSMENT — PAIN DESCRIPTION - LOCATION
LOCATION: LEG;FOOT
LOCATION: LEG
LOCATION: FOOT;LEG
LOCATION: LEG;FOOT
LOCATION: FOOT;LEG

## 2023-08-02 ASSESSMENT — PAIN SCALES - GENERAL
PAINLEVEL_OUTOF10: 7
PAINLEVEL_OUTOF10: 8

## 2023-08-02 ASSESSMENT — PAIN DESCRIPTION - ONSET: ONSET: ON-GOING

## 2023-08-02 ASSESSMENT — PAIN DESCRIPTION - FREQUENCY: FREQUENCY: CONTINUOUS

## 2023-08-02 NOTE — DIALYSIS
check: less than 0.1 ppm at:1045    Access Flows and Pressures  Patient Vitals for the past 8 hrs:   Blood Flow Rate (ml/min) Ultrafiltration Rate (ml/hr) Arterial Pressure (mmHg) Venous Pressure (mmHg) TMP DFR Comments Access Visible   08/02/23 0828 350 ml/min 1000 ml/hr -160 mmHg 130 80 800 pt resting, lines visible Yes   08/02/23 0830 350 ml/min 1000 ml/hr -160 mmHg 130 80 800 pt resting, denies needs Yes   08/02/23 0845 350 ml/min 1000 ml/hr -160 mmHg 130 90 800 pt alsleep Yes   08/02/23 0900 350 ml/min 1000 ml/hr -110 mmHg 150 50 800 pt asleep Yes   08/02/23 0915 350 ml/min 1000 ml/hr -110 mmHg 150 60 800 pt asleep Yes   08/02/23 0930 350 ml/min 1000 ml/hr -120 mmHg 150 50 800 pt alseep Yes   08/02/23 0945 350 ml/min 1000 ml/hr -120 mmHg 150 50 800 pt alseep Yes   08/02/23 1000 350 ml/min 1000 ml/hr -110 mmHg 150 50 800 pt using his cell phone Yes   08/02/23 1015 350 ml/min 1000 ml/hr -110 mmHg 150 50 800 pt using cell phone Yes   08/02/23 1030 350 ml/min 1000 ml/hr -110 mmHg 160 50 800 pt sleeping Yes   08/02/23 1045 350 ml/min 1000 ml/hr -110 mmHg 160 50 800 pt sleeping Yes   08/02/23 1100 350 ml/min 1000 ml/hr -110 mmHg 170 50 800 Pt Sleeping Yes   08/02/23 1115 350 ml/min 1000 ml/hr -110 mmHg 170 50 800 pt sleeping Yes   08/02/23 1130 350 ml/min 1000 ml/hr -110 mmHg 170 50 800 pt sleeping Yes   08/02/23 1145 350 ml/min 1020 ml/hr -110 mmHg 220 60 800 pt  resting Yes   08/02/23 1200 350 ml/min 1020 ml/hr -110 mmHg 220 60 800 pt resting Yes     Vital Signs  Patient Vitals for the past 8 hrs:   BP Temp Pulse Resp SpO2   08/02/23 0815 112/64 -- 73 (!) 9 --   08/02/23 0828 119/75 -- 72 10 --   08/02/23 0830 119/75 -- 71 21 --   08/02/23 0845 129/81 -- 70 13 --   08/02/23 0900 117/71 -- 70 15 --   08/02/23 0915 (!) 140/86 -- 71 14 --   08/02/23 0930 (!) 155/94 -- 72 15 --   08/02/23 0945 (!) 148/92 -- 74 11 --   08/02/23 1000 (!) 153/91 -- 76 12 --   08/02/23 1015 (!) 162/88 -- 77 14 --   08/02/23 1030 (!) 178/92

## 2023-08-02 NOTE — CARE COORDINATION
CM reviewed chart and spoke with pt in room. Pt is alert and oriented. Pt lives at home with his 23 yr old daughter. Pt has had trouble with transportation since his wife left him a few months ago. CM provided transportation resources for after his hospital stay along with other community resources. Pt stated that he would need a ride to get home after discharge. Pt has HD M-W-F with [facility]. Pt does not remember who he gets his oxygen from. Denies any other DME. Pt has difficulty with stairs to get to the second floor with the shower and uses the rail one side to aide him. CM asked if pt would like . CM asked how his experience was with [facility] previously. Pt said it was good and that he would return with them. CM offered other choices and pt declines stating he would like to return with [facility]. CM to make [facility] referral for [facility] [employer] Qu[facility]..      08/02/23 6852   Service Assessment   Patient Orientation Alert and Oriented   Cognition Alert   History Provided By Patient;Medical Record   Primary [address] is: Legal Next of Kin   PCP Verified by CM Yes   Prior Functional Level Independent in ADLs/IADLs   Current Functional Level Independent in ADLs/IADLs   Can patient return to prior living arrangement Yes   Ability to make needs known: Good   Family able to assist with home care needs: Other (comment)  (limited)   Would you like for me to discuss the discharge plan with any other family members/significant others, and if so, who? No   Financial Resources Medicare;Medicaid   [employer] None   Social/Functional History   Lives With Daughter  (Wife recently seperated. Pt's oldest dgt (23 yrs) lives with him. His other 2 younger children live with the ex-wife.  Oldest child is 25 and out of the house.)   Type of [address] [place] [address]  Two level  (stairs are hard but he can do them)   Home Access Stairs to enter without rails   Entrance Stairs -

## 2023-08-02 NOTE — CARE COORDINATION
CM to make referral to [facility]. for PT/OT for pt. ID is currently following pt with IVAB. CM PS Dr. Yousuf Monge asking if pt will be needing IVAB post discharge. Still unsure at the moment. Waiting on wound cultures. CM to wait to make referral to [facility] pending IV recs for post discharge needs.     [facility] fax for referral when ready: [phone]

## 2023-08-03 ENCOUNTER — APPOINTMENT (OUTPATIENT)
Dept: GENERAL RADIOLOGY | Age: 48
DRG: 299 | End: 2023-08-03
Attending: INTERNAL MEDICINE
Payer: MEDICARE

## 2023-08-03 LAB
ANION GAP SERPL CALCULATED.3IONS-SCNC: 14 MMOL/L (ref 4–16)
BASOPHILS ABSOLUTE: 0 K/CU MM
BASOPHILS RELATIVE PERCENT: 0.5 % (ref 0–1)
BUN SERPL-MCNC: 45 MG/DL (ref 6–23)
CALCIUM SERPL-MCNC: 7.3 MG/DL (ref 8.3–10.6)
CHLORIDE BLD-SCNC: 96 MMOL/L (ref 99–110)
CO2: 27 MMOL/L (ref 21–32)
CREAT SERPL-MCNC: 7.9 MG/DL (ref 0.9–1.3)
DIFFERENTIAL TYPE: ABNORMAL
EKG ATRIAL RATE: 82 BPM
EKG DIAGNOSIS: NORMAL
EKG P AXIS: 15 DEGREES
EKG P-R INTERVAL: 182 MS
EKG Q-T INTERVAL: 430 MS
EKG QRS DURATION: 80 MS
EKG QTC CALCULATION (BAZETT): 502 MS
EKG R AXIS: 40 DEGREES
EKG T AXIS: 83 DEGREES
EKG VENTRICULAR RATE: 82 BPM
EOSINOPHILS ABSOLUTE: 0.2 K/CU MM
EOSINOPHILS RELATIVE PERCENT: 3.5 % (ref 0–3)
GFR SERPL CREATININE-BSD FRML MDRD: 8 ML/MIN/1.73M2
GLUCOSE BLD-MCNC: 156 MG/DL (ref 70–99)
GLUCOSE BLD-MCNC: 161 MG/DL (ref 70–99)
GLUCOSE BLD-MCNC: 167 MG/DL (ref 70–99)
GLUCOSE BLD-MCNC: 182 MG/DL (ref 70–99)
GLUCOSE SERPL-MCNC: 190 MG/DL (ref 70–99)
HCT VFR BLD CALC: 27.8 % (ref 42–52)
HEMOGLOBIN: 8.6 GM/DL (ref 13.5–18)
IMMATURE NEUTROPHIL %: 0.5 % (ref 0–0.43)
LYMPHOCYTES ABSOLUTE: 1 K/CU MM
LYMPHOCYTES RELATIVE PERCENT: 16.4 % (ref 24–44)
MCH RBC QN AUTO: 29 PG (ref 27–31)
MCHC RBC AUTO-ENTMCNC: 30.9 % (ref 32–36)
MCV RBC AUTO: 93.6 FL (ref 78–100)
MONOCYTES ABSOLUTE: 0.6 K/CU MM
MONOCYTES RELATIVE PERCENT: 9.8 % (ref 0–4)
NUCLEATED RBC %: 0 %
PDW BLD-RTO: 15.4 % (ref 11.7–14.9)
PLATELET # BLD: 239 K/CU MM (ref 140–440)
PMV BLD AUTO: 10 FL (ref 7.5–11.1)
POTASSIUM SERPL-SCNC: 4.7 MMOL/L (ref 3.5–5.1)
RBC # BLD: 2.97 M/CU MM (ref 4.6–6.2)
SEGMENTED NEUTROPHILS ABSOLUTE COUNT: 4.3 K/CU MM
SEGMENTED NEUTROPHILS RELATIVE PERCENT: 69.3 % (ref 36–66)
SODIUM BLD-SCNC: 137 MMOL/L (ref 135–145)
TOTAL IMMATURE NEUTOROPHIL: 0.03 K/CU MM
TOTAL NUCLEATED RBC: 0 K/CU MM
WBC # BLD: 6.2 K/CU MM (ref 4–10.5)

## 2023-08-03 PROCEDURE — 6360000002 HC RX W HCPCS: Performed by: STUDENT IN AN ORGANIZED HEALTH CARE EDUCATION/TRAINING PROGRAM

## 2023-08-03 PROCEDURE — 80048 BASIC METABOLIC PNL TOTAL CA: CPT

## 2023-08-03 PROCEDURE — 93010 ELECTROCARDIOGRAM REPORT: CPT | Performed by: INTERNAL MEDICINE

## 2023-08-03 PROCEDURE — 2500000003 HC RX 250 WO HCPCS: Performed by: INTERNAL MEDICINE

## 2023-08-03 PROCEDURE — 6370000000 HC RX 637 (ALT 250 FOR IP): Performed by: INTERNAL MEDICINE

## 2023-08-03 PROCEDURE — 36415 COLL VENOUS BLD VENIPUNCTURE: CPT

## 2023-08-03 PROCEDURE — 94761 N-INVAS EAR/PLS OXIMETRY MLT: CPT

## 2023-08-03 PROCEDURE — 71045 X-RAY EXAM CHEST 1 VIEW: CPT

## 2023-08-03 PROCEDURE — 85025 COMPLETE CBC W/AUTO DIFF WBC: CPT

## 2023-08-03 PROCEDURE — 82962 GLUCOSE BLOOD TEST: CPT

## 2023-08-03 PROCEDURE — 1200000000 HC SEMI PRIVATE

## 2023-08-03 PROCEDURE — 2580000003 HC RX 258: Performed by: STUDENT IN AN ORGANIZED HEALTH CARE EDUCATION/TRAINING PROGRAM

## 2023-08-03 PROCEDURE — 6360000002 HC RX W HCPCS: Performed by: INTERNAL MEDICINE

## 2023-08-03 PROCEDURE — 2580000003 HC RX 258: Performed by: INTERNAL MEDICINE

## 2023-08-03 PROCEDURE — 99232 SBSQ HOSP IP/OBS MODERATE 35: CPT | Performed by: INTERNAL MEDICINE

## 2023-08-03 PROCEDURE — 6370000000 HC RX 637 (ALT 250 FOR IP): Performed by: STUDENT IN AN ORGANIZED HEALTH CARE EDUCATION/TRAINING PROGRAM

## 2023-08-03 RX ORDER — FAMOTIDINE 20 MG/1
20 TABLET, FILM COATED ORAL DAILY
Status: DISCONTINUED | OUTPATIENT
Start: 2023-08-03 | End: 2023-08-06 | Stop reason: HOSPADM

## 2023-08-03 RX ORDER — CALCIUM GLUCONATE 20 MG/ML
2000 INJECTION, SOLUTION INTRAVENOUS ONCE
Status: COMPLETED | OUTPATIENT
Start: 2023-08-03 | End: 2023-08-03

## 2023-08-03 RX ADMIN — CALCIUM ACETATE 667 MG: 667 CAPSULE ORAL at 17:32

## 2023-08-03 RX ADMIN — HEPARIN SODIUM 5000 UNITS: 5000 INJECTION INTRAVENOUS; SUBCUTANEOUS at 17:33

## 2023-08-03 RX ADMIN — ONDANSETRON 4 MG: 2 INJECTION INTRAMUSCULAR; INTRAVENOUS at 12:53

## 2023-08-03 RX ADMIN — PIPERACILLIN AND TAZOBACTAM 4500 MG: 4; .5 INJECTION, POWDER, FOR SOLUTION INTRAVENOUS at 19:00

## 2023-08-03 RX ADMIN — CALCIUM GLUCONATE 2000 MG: 20 INJECTION, SOLUTION INTRAVENOUS at 09:18

## 2023-08-03 RX ADMIN — ONDANSETRON 4 MG: 2 INJECTION INTRAMUSCULAR; INTRAVENOUS at 06:53

## 2023-08-03 RX ADMIN — OXYCODONE AND ACETAMINOPHEN 1 TABLET: 7.5; 325 TABLET ORAL at 21:17

## 2023-08-03 RX ADMIN — HEPARIN SODIUM 5000 UNITS: 5000 INJECTION INTRAVENOUS; SUBCUTANEOUS at 09:09

## 2023-08-03 RX ADMIN — CARVEDILOL 25 MG: 25 TABLET, FILM COATED ORAL at 17:33

## 2023-08-03 RX ADMIN — COLLAGENASE SANTYL: 250 OINTMENT TOPICAL at 06:56

## 2023-08-03 RX ADMIN — ATORVASTATIN CALCIUM 40 MG: 40 TABLET, FILM COATED ORAL at 21:18

## 2023-08-03 RX ADMIN — COLLAGENASE SANTYL: 250 OINTMENT TOPICAL at 09:27

## 2023-08-03 RX ADMIN — HYDROMORPHONE HYDROCHLORIDE 0.25 MG: 1 INJECTION, SOLUTION INTRAMUSCULAR; INTRAVENOUS; SUBCUTANEOUS at 12:39

## 2023-08-03 RX ADMIN — CALCIUM ACETATE 667 MG: 667 CAPSULE ORAL at 09:09

## 2023-08-03 RX ADMIN — ASPIRIN 81 MG: 81 TABLET, CHEWABLE ORAL at 09:09

## 2023-08-03 RX ADMIN — SODIUM CHLORIDE, PRESERVATIVE FREE 10 ML: 5 INJECTION INTRAVENOUS at 09:08

## 2023-08-03 RX ADMIN — CARVEDILOL 25 MG: 25 TABLET, FILM COATED ORAL at 09:09

## 2023-08-03 RX ADMIN — PIPERACILLIN AND TAZOBACTAM 4500 MG: 4; .5 INJECTION, POWDER, FOR SOLUTION INTRAVENOUS at 06:57

## 2023-08-03 RX ADMIN — HYDROMORPHONE HYDROCHLORIDE 0.25 MG: 1 INJECTION, SOLUTION INTRAMUSCULAR; INTRAVENOUS; SUBCUTANEOUS at 06:52

## 2023-08-03 RX ADMIN — HEPARIN SODIUM 5000 UNITS: 5000 INJECTION INTRAVENOUS; SUBCUTANEOUS at 00:29

## 2023-08-03 RX ADMIN — SODIUM CHLORIDE, PRESERVATIVE FREE 10 ML: 5 INJECTION INTRAVENOUS at 06:53

## 2023-08-03 RX ADMIN — FAMOTIDINE 20 MG: 20 TABLET ORAL at 12:42

## 2023-08-03 RX ADMIN — ARIPIPRAZOLE 5 MG: 10 TABLET ORAL at 21:17

## 2023-08-03 RX ADMIN — LINEZOLID 600 MG: 600 INJECTION, SOLUTION INTRAVENOUS at 00:24

## 2023-08-03 RX ADMIN — CALCITRIOL CAPSULES 0.25 MCG 0.25 MCG: 0.25 CAPSULE ORAL at 09:09

## 2023-08-03 RX ADMIN — CALCIUM ACETATE 667 MG: 667 CAPSULE ORAL at 12:42

## 2023-08-03 RX ADMIN — LINEZOLID 600 MG: 600 INJECTION, SOLUTION INTRAVENOUS at 12:49

## 2023-08-03 RX ADMIN — OXYCODONE AND ACETAMINOPHEN 1 TABLET: 7.5; 325 TABLET ORAL at 09:07

## 2023-08-03 RX ADMIN — ONDANSETRON 4 MG: 2 INJECTION INTRAMUSCULAR; INTRAVENOUS at 21:24

## 2023-08-03 ASSESSMENT — PAIN DESCRIPTION - DESCRIPTORS
DESCRIPTORS: ACHING;THROBBING
DESCRIPTORS: ACHING
DESCRIPTORS: ACHING

## 2023-08-03 ASSESSMENT — PAIN DESCRIPTION - LOCATION
LOCATION: LEG;FOOT

## 2023-08-03 ASSESSMENT — PAIN DESCRIPTION - ORIENTATION
ORIENTATION: LEFT

## 2023-08-03 ASSESSMENT — PAIN SCALES - GENERAL
PAINLEVEL_OUTOF10: 7
PAINLEVEL_OUTOF10: 2
PAINLEVEL_OUTOF10: 7
PAINLEVEL_OUTOF10: 3

## 2023-08-03 ASSESSMENT — PAIN DESCRIPTION - PAIN TYPE: TYPE: SURGICAL PAIN

## 2023-08-03 ASSESSMENT — PAIN - FUNCTIONAL ASSESSMENT
PAIN_FUNCTIONAL_ASSESSMENT: ACTIVITIES ARE NOT PREVENTED

## 2023-08-03 ASSESSMENT — PAIN SCALES - WONG BAKER
WONGBAKER_NUMERICALRESPONSE: 2
WONGBAKER_NUMERICALRESPONSE: 2

## 2023-08-03 NOTE — CONSULTS
[address], [address] [place] [address], [address] [place] [address]  Phone: [phone]  Office Hours: 8:30AM - 4:30PM  Monday - Friday     Nephrology Service Consultation    Patient:  Albina Zuniga  MRN: [id number]  Consulting physician:  Shanell Sherwood MD  Reason for Consult: ESRD    History Obtained From:  patient, electronic medical record  PCP: Hany De Leon MD    HISTORY OF PRESENT ILLNESS:   The patient is a 50 y.o. male who presents with gangrene of foot-  Recently  from wife and has no help at home  Poorly controlled DM2  On dialysis MWF  Feels better after antibiotics  Awaiting surgery eval    Past Medical History:        Diagnosis Date    Abscess 2010    scrotal    Acute renal failure (ARF) ([address]) 08/04/2019    Anxiety associated with depression     Anxiety associated with depression     Back pain 07/02/2012    CAD (coronary artery disease) 2/10/2023    Chest pain 05/01/2013    Diabetic nephropathy ([address])     Diabetic neuropathy ([address]) 12/22/2011    Diabetic ulcer of left midfoot associated with type 2 diabetes mellitus, with fat layer exposed ([address]) 07/18/2017    Diverticulosis     C scope + Dr. Jeff Nixon    DM (diabetes mellitus), type 2 ([address]) 2002    DR. 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 Jeff Nixon    Necrotizing fasciitis [facility])     Nose fracture 1988    Panic attacks     Pericarditis 2003    Hospitalized with s/p heart cath normal    Peripheral autonomic neuropathy due to diabetes mellitus ([address])     Axonal EMG- NCS, March 2011    Sebaceous cyst 09/01/2011    URI (upper respiratory infection) 02/27/2012    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 type 2 diabetes mellitus, with muscle involvement without evidence of necrosis ([address]) 9/21/2021    WD-Wound, surgical,
Infectious Disease Consult Note  2023   Patient Name: Ludivina Lawrence : 1975     Assessment  Left foot diabetic plantar ulcer and infection  Chronic plantar ulcer present for > 3 months  Likely polymicrobial infection  Since his divorce, transportation to his wound care appointments has been hard. ESRD   On HD  Comorbid conditions:     Plan  Therapeutic: continue linezolid. D/c cefepime and start Zosyn  Diagnostic: CRP, ESR  F/u: wound cx  Other: Surgery evaluation    Thank you for allowing me to consult in the care of this patient.  ------------------------  REASON FOR CONSULT: Left gangrenous diabetic foot infection   Requested by: Elias Mcleod MD   HPI:Patient is a 50 y.o. male with type 2 diabetes mellitus and diabetic polyneuropathy, ESRD, chronic lower extremity wounds. Last seen by the infectious disease service in 2021 after being treated for Irene's gangrene and had a right groin wound that was positive for E. coli and Enterobacter cloacae. He completed a 10-day course of ertapenem and fluconazole. Rolo Caro He was admitted 2023 for further evaluation and management of worsening pain in his left foot and left lower leg. He was earlier seen in the emergency department on 2023 with complaints of left foot plantar wound along the first metatarsal.  He received Bactrim and cephalexin but the wound worsened and he returned. He has not been able to follow-up with the wound clinic for about 3 months owing to lack of transportation. He reported that he had chills over the last few days. In the emergency department a CT of the left foot was done and showed large ulcer on the medial aspect of the first metatarsal and metatarsophalangeal joint with underlying cellulitis and possibly tenosynovitis. CT of the tibia and fibula with contrast showed subcutaneous edema in the calf with worsening around the ankle and the foot. Intravenous vancomycin and Zosyn was given empirically.   He was
[facility]  Consultation Note    Eric Tierney M.D.      Reason for Consult:  Gangrene of the left foot      Patient's Name/Date of Birth: Bisi Vanegas / 1975 (10 y.o.)    Date: August 1, 2023     HPI:  44-year-old male admitted with a gangrenous ulcer of the left foot over the first metatarsal head. I know this patient who has a history of diabetes and renal failure on hemodialysis. I performed the creation of a left upper arm AV fistula last year. The patient claims he has had this ulcer for a period of time he has been seen in the wound clinic and also been treated with antibiotics. He started having pain up his leg and foul-smelling wound of the foot. His wife had left him recently has been trying to manage this on his own. I have been consulted to see the patient for his foot ulcer. CT scan was performed of the foot which did not show any obvious radiographic signs of osteomyelitis. Past Medical History:   Diagnosis Date    Abscess 2010    scrotal    Acute renal failure (ARF) ([address]) 08/04/2019    Anxiety associated with depression     Anxiety associated with depression     Back pain 07/02/2012    CAD (coronary artery disease) 2/10/2023    Chest pain 05/01/2013    Diabetic nephropathy ([address])     Diabetic neuropathy ([address]) 12/22/2011    Diabetic ulcer of left midfoot associated with type 2 diabetes mellitus, with fat layer exposed ([address]) 07/18/2017    Diverticulosis     C scope + Dr. Jackie Ruth    DM (diabetes mellitus), type 2 ([address]) 2002    DR. 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 Jackie Ruth    Necrotizing fasciitis [facility])     Nose fracture 1988    Panic attacks     Pericarditis 2003    Hospitalized with s/p heart cath normal    Peripheral autonomic neuropathy
Tunneled Hemodialysis Catheter was not present on arrival.
2019    EGD BIOPSY performed by William Akhtar MD at [address] [place] [address] N/A 2022    EGD DIAGNOSTIC ONLY performed by William Akhtar MD at [facility]    Family History   Problem Relation Age of Onset    Cancer Mother         ?site    Stroke Mother     Bleeding Prob Mother     Diabetes Father     Heart Disease Father     High Blood Pressure Father     Obesity Father     Kidney Disease Father     Diabetes Sister     High Blood Pressure Sister     Mental Illness Sister     Obesity Sister     High Blood Pressure Other     Mental Illness Other         bipolar    Other Son         cyst in ear canal    ADHD Daughter        SOCIAL HISTORY    Social History     Tobacco Use    Smoking status: Former     Years: 20.00     Types: Cigarettes     Quit date: 2022     Years since quittin.0    Smokeless tobacco: Never    Tobacco comments:     Smokes when drinking/social   Vaping Use    Vaping Use: Never used   Substance Use Topics    Alcohol use: Not Currently     Comment: occ    Drug use: Yes     Frequency: 7.0 times per week     Types: Marijuana Gerhardt Salle)     Comment: 21 @        ALLERGIES    Allergies   Allergen Reactions    Adhesive Tape Rash    Doxycycline Nausea And Vomiting    Reglan [Metoclopramide] Anxiety       MEDICATIONS    No current facility-administered medications on file prior to encounter.      Current Outpatient Medications on File Prior to Encounter   Medication Sig Dispense Refill    atorvastatin (LIPITOR) 40 MG tablet Take 1 tablet by mouth nightly 30 tablet 3    insulin glargine (LANTUS SOLOSTAR) 100 UNIT/ML injection pen Inject 75 Units into the skin nightly 1 mL 0    insulin lispro, 1 Unit Dial, (HUMALOG KWIKPEN) 100 UNIT/ML SOPN Inject 35 Units into the skin 3 times daily (before meals) 1 mL 0    calcitRIOL (ROCALTROL) 0.5 MCG capsule Take 1 capsule by mouth daily 14 capsule 3    carvedilol (COREG) 25 MG tablet Take 1 tablet by

## 2023-08-03 NOTE — CARE COORDINATION
WIll need referral to [facility] if needs IV ATB at dc. Pending wound cx at this time. ID on boad for determination.    Please fax [phone] order to [facility] fax: [phone]

## 2023-08-04 LAB
ANION GAP SERPL CALCULATED.3IONS-SCNC: 15 MMOL/L (ref 4–16)
BASOPHILS ABSOLUTE: 0 K/CU MM
BASOPHILS RELATIVE PERCENT: 0.6 % (ref 0–1)
BUN SERPL-MCNC: 51 MG/DL (ref 6–23)
CALCIUM IONIZED: 3.88 MG/DL (ref 4.48–5.28)
CALCIUM SERPL-MCNC: 6.8 MG/DL (ref 8.3–10.6)
CHLORIDE BLD-SCNC: 96 MMOL/L (ref 99–110)
CO2: 23 MMOL/L (ref 21–32)
CREAT SERPL-MCNC: 8.9 MG/DL (ref 0.9–1.3)
CULTURE: ABNORMAL
DIFFERENTIAL TYPE: ABNORMAL
EOSINOPHILS ABSOLUTE: 0.2 K/CU MM
EOSINOPHILS RELATIVE PERCENT: 3.3 % (ref 0–3)
GFR SERPL CREATININE-BSD FRML MDRD: 7 ML/MIN/1.73M2
GLUCOSE BLD-MCNC: 122 MG/DL (ref 70–99)
GLUCOSE BLD-MCNC: 168 MG/DL (ref 70–99)
GLUCOSE BLD-MCNC: 288 MG/DL (ref 70–99)
GLUCOSE SERPL-MCNC: 137 MG/DL (ref 70–99)
HCT VFR BLD CALC: 27.4 % (ref 42–52)
HEMOGLOBIN: 8.7 GM/DL (ref 13.5–18)
IMMATURE NEUTROPHIL %: 0.4 % (ref 0–0.43)
IONIZED CA: 0.97 MMOL/L (ref 1.12–1.32)
LYMPHOCYTES ABSOLUTE: 1.3 K/CU MM
LYMPHOCYTES RELATIVE PERCENT: 17.3 % (ref 24–44)
Lab: ABNORMAL
MCH RBC QN AUTO: 29.6 PG (ref 27–31)
MCHC RBC AUTO-ENTMCNC: 31.8 % (ref 32–36)
MCV RBC AUTO: 93.2 FL (ref 78–100)
MONOCYTES ABSOLUTE: 0.6 K/CU MM
MONOCYTES RELATIVE PERCENT: 8.5 % (ref 0–4)
NUCLEATED RBC %: 0 %
PDW BLD-RTO: 14.8 % (ref 11.7–14.9)
PLATELET # BLD: 227 K/CU MM (ref 140–440)
PMV BLD AUTO: 9.2 FL (ref 7.5–11.1)
POTASSIUM SERPL-SCNC: 5 MMOL/L (ref 3.5–5.1)
RBC # BLD: 2.94 M/CU MM (ref 4.6–6.2)
SEGMENTED NEUTROPHILS ABSOLUTE COUNT: 5.1 K/CU MM
SEGMENTED NEUTROPHILS RELATIVE PERCENT: 69.9 % (ref 36–66)
SODIUM BLD-SCNC: 134 MMOL/L (ref 135–145)
SPECIMEN: ABNORMAL
TOTAL IMMATURE NEUTOROPHIL: 0.03 K/CU MM
TOTAL NUCLEATED RBC: 0 K/CU MM
WBC # BLD: 7.3 K/CU MM (ref 4–10.5)

## 2023-08-04 PROCEDURE — 6360000002 HC RX W HCPCS: Performed by: STUDENT IN AN ORGANIZED HEALTH CARE EDUCATION/TRAINING PROGRAM

## 2023-08-04 PROCEDURE — 2580000003 HC RX 258: Performed by: INTERNAL MEDICINE

## 2023-08-04 PROCEDURE — 1200000000 HC SEMI PRIVATE

## 2023-08-04 PROCEDURE — 85025 COMPLETE CBC W/AUTO DIFF WBC: CPT

## 2023-08-04 PROCEDURE — 6360000002 HC RX W HCPCS: Performed by: INTERNAL MEDICINE

## 2023-08-04 PROCEDURE — 2500000003 HC RX 250 WO HCPCS: Performed by: INTERNAL MEDICINE

## 2023-08-04 PROCEDURE — 82962 GLUCOSE BLOOD TEST: CPT

## 2023-08-04 PROCEDURE — 6370000000 HC RX 637 (ALT 250 FOR IP): Performed by: INTERNAL MEDICINE

## 2023-08-04 PROCEDURE — 90935 HEMODIALYSIS ONE EVALUATION: CPT

## 2023-08-04 PROCEDURE — 82330 ASSAY OF CALCIUM: CPT

## 2023-08-04 PROCEDURE — 2580000003 HC RX 258: Performed by: STUDENT IN AN ORGANIZED HEALTH CARE EDUCATION/TRAINING PROGRAM

## 2023-08-04 PROCEDURE — 99232 SBSQ HOSP IP/OBS MODERATE 35: CPT | Performed by: INTERNAL MEDICINE

## 2023-08-04 PROCEDURE — 94761 N-INVAS EAR/PLS OXIMETRY MLT: CPT

## 2023-08-04 PROCEDURE — 6370000000 HC RX 637 (ALT 250 FOR IP): Performed by: STUDENT IN AN ORGANIZED HEALTH CARE EDUCATION/TRAINING PROGRAM

## 2023-08-04 PROCEDURE — 80048 BASIC METABOLIC PNL TOTAL CA: CPT

## 2023-08-04 PROCEDURE — 36415 COLL VENOUS BLD VENIPUNCTURE: CPT

## 2023-08-04 RX ORDER — SULFAMETHOXAZOLE AND TRIMETHOPRIM 400; 80 MG/1; MG/1
1 TABLET ORAL EVERY 12 HOURS SCHEDULED
Status: DISCONTINUED | OUTPATIENT
Start: 2023-08-04 | End: 2023-08-06 | Stop reason: HOSPADM

## 2023-08-04 RX ORDER — LABETALOL HYDROCHLORIDE 5 MG/ML
10 INJECTION, SOLUTION INTRAVENOUS EVERY 4 HOURS PRN
Status: DISCONTINUED | OUTPATIENT
Start: 2023-08-04 | End: 2023-08-06 | Stop reason: HOSPADM

## 2023-08-04 RX ADMIN — ARIPIPRAZOLE 5 MG: 10 TABLET ORAL at 22:27

## 2023-08-04 RX ADMIN — PIPERACILLIN AND TAZOBACTAM 4500 MG: 4; .5 INJECTION, POWDER, FOR SOLUTION INTRAVENOUS at 06:48

## 2023-08-04 RX ADMIN — HEPARIN SODIUM 5000 UNITS: 5000 INJECTION INTRAVENOUS; SUBCUTANEOUS at 00:58

## 2023-08-04 RX ADMIN — FAMOTIDINE 20 MG: 20 TABLET ORAL at 12:57

## 2023-08-04 RX ADMIN — HEPARIN SODIUM 5000 UNITS: 5000 INJECTION INTRAVENOUS; SUBCUTANEOUS at 22:27

## 2023-08-04 RX ADMIN — HEPARIN SODIUM 5000 UNITS: 5000 INJECTION INTRAVENOUS; SUBCUTANEOUS at 16:54

## 2023-08-04 RX ADMIN — ASPIRIN 81 MG: 81 TABLET, CHEWABLE ORAL at 12:57

## 2023-08-04 RX ADMIN — SULFAMETHOXAZOLE AND TRIMETHOPRIM 1 TABLET: 400; 80 TABLET ORAL at 22:25

## 2023-08-04 RX ADMIN — HYDROMORPHONE HYDROCHLORIDE 0.25 MG: 1 INJECTION, SOLUTION INTRAMUSCULAR; INTRAVENOUS; SUBCUTANEOUS at 01:31

## 2023-08-04 RX ADMIN — CARVEDILOL 25 MG: 25 TABLET, FILM COATED ORAL at 11:14

## 2023-08-04 RX ADMIN — SODIUM CHLORIDE, PRESERVATIVE FREE 10 ML: 5 INJECTION INTRAVENOUS at 12:58

## 2023-08-04 RX ADMIN — SODIUM CHLORIDE, PRESERVATIVE FREE 10 ML: 5 INJECTION INTRAVENOUS at 22:28

## 2023-08-04 RX ADMIN — OXYCODONE AND ACETAMINOPHEN 1 TABLET: 7.5; 325 TABLET ORAL at 13:04

## 2023-08-04 RX ADMIN — CALCIUM ACETATE 667 MG: 667 CAPSULE ORAL at 12:57

## 2023-08-04 RX ADMIN — CALCITRIOL CAPSULES 0.25 MCG 0.25 MCG: 0.25 CAPSULE ORAL at 12:56

## 2023-08-04 RX ADMIN — SODIUM CHLORIDE, PRESERVATIVE FREE 10 ML: 5 INJECTION INTRAVENOUS at 01:32

## 2023-08-04 RX ADMIN — COLLAGENASE SANTYL: 250 OINTMENT TOPICAL at 18:06

## 2023-08-04 RX ADMIN — OXYCODONE AND ACETAMINOPHEN 1 TABLET: 7.5; 325 TABLET ORAL at 22:27

## 2023-08-04 RX ADMIN — ATORVASTATIN CALCIUM 40 MG: 40 TABLET, FILM COATED ORAL at 22:27

## 2023-08-04 RX ADMIN — SODIUM CHLORIDE: 9 INJECTION, SOLUTION INTRAVENOUS at 06:47

## 2023-08-04 ASSESSMENT — PAIN DESCRIPTION - FREQUENCY: FREQUENCY: CONTINUOUS

## 2023-08-04 ASSESSMENT — PAIN DESCRIPTION - DESCRIPTORS
DESCRIPTORS: ACHING;DISCOMFORT
DESCRIPTORS: THROBBING
DESCRIPTORS: SHARP
DESCRIPTORS: BURNING

## 2023-08-04 ASSESSMENT — PAIN DESCRIPTION - ORIENTATION
ORIENTATION: LEFT

## 2023-08-04 ASSESSMENT — PAIN DESCRIPTION - ONSET: ONSET: ON-GOING

## 2023-08-04 ASSESSMENT — PAIN DESCRIPTION - LOCATION
LOCATION: FOOT
LOCATION: FOOT
LOCATION: FOOT;LEG
LOCATION: FOOT;LEG

## 2023-08-04 ASSESSMENT — PAIN SCALES - GENERAL
PAINLEVEL_OUTOF10: 7
PAINLEVEL_OUTOF10: 0
PAINLEVEL_OUTOF10: 8
PAINLEVEL_OUTOF10: 8

## 2023-08-04 ASSESSMENT — PAIN DESCRIPTION - PAIN TYPE: TYPE: SURGICAL PAIN

## 2023-08-04 ASSESSMENT — PAIN - FUNCTIONAL ASSESSMENT: PAIN_FUNCTIONAL_ASSESSMENT: PREVENTS OR INTERFERES WITH MANY ACTIVE NOT PASSIVE ACTIVITIES

## 2023-08-05 LAB
ANION GAP SERPL CALCULATED.3IONS-SCNC: 12 MMOL/L (ref 4–16)
BASOPHILS ABSOLUTE: 0.1 K/CU MM
BASOPHILS RELATIVE PERCENT: 0.6 % (ref 0–1)
BUN SERPL-MCNC: 40 MG/DL (ref 6–23)
CALCIUM SERPL-MCNC: 7.6 MG/DL (ref 8.3–10.6)
CHLORIDE BLD-SCNC: 101 MMOL/L (ref 99–110)
CO2: 26 MMOL/L (ref 21–32)
CREAT SERPL-MCNC: 7.3 MG/DL (ref 0.9–1.3)
DIFFERENTIAL TYPE: ABNORMAL
EOSINOPHILS ABSOLUTE: 0.2 K/CU MM
EOSINOPHILS RELATIVE PERCENT: 2.5 % (ref 0–3)
GFR SERPL CREATININE-BSD FRML MDRD: 9 ML/MIN/1.73M2
GLUCOSE BLD-MCNC: 136 MG/DL (ref 70–99)
GLUCOSE BLD-MCNC: 162 MG/DL (ref 70–99)
GLUCOSE BLD-MCNC: 163 MG/DL (ref 70–99)
GLUCOSE BLD-MCNC: 169 MG/DL (ref 70–99)
GLUCOSE SERPL-MCNC: 156 MG/DL (ref 70–99)
HCT VFR BLD CALC: 28.3 % (ref 42–52)
HEMOGLOBIN: 8.8 GM/DL (ref 13.5–18)
IMMATURE NEUTROPHIL %: 0.4 % (ref 0–0.43)
LYMPHOCYTES ABSOLUTE: 1.2 K/CU MM
LYMPHOCYTES RELATIVE PERCENT: 15 % (ref 24–44)
MCH RBC QN AUTO: 29.4 PG (ref 27–31)
MCHC RBC AUTO-ENTMCNC: 31.1 % (ref 32–36)
MCV RBC AUTO: 94.6 FL (ref 78–100)
MONOCYTES ABSOLUTE: 0.7 K/CU MM
MONOCYTES RELATIVE PERCENT: 8.9 % (ref 0–4)
NUCLEATED RBC %: 0 %
PDW BLD-RTO: 15 % (ref 11.7–14.9)
PLATELET # BLD: 258 K/CU MM (ref 140–440)
PMV BLD AUTO: 9.4 FL (ref 7.5–11.1)
POTASSIUM SERPL-SCNC: 4.8 MMOL/L (ref 3.5–5.1)
RBC # BLD: 2.99 M/CU MM (ref 4.6–6.2)
SEGMENTED NEUTROPHILS ABSOLUTE COUNT: 5.7 K/CU MM
SEGMENTED NEUTROPHILS RELATIVE PERCENT: 72.6 % (ref 36–66)
SODIUM BLD-SCNC: 139 MMOL/L (ref 135–145)
TOTAL IMMATURE NEUTOROPHIL: 0.03 K/CU MM
TOTAL NUCLEATED RBC: 0 K/CU MM
WBC # BLD: 7.9 K/CU MM (ref 4–10.5)

## 2023-08-05 PROCEDURE — 80048 BASIC METABOLIC PNL TOTAL CA: CPT

## 2023-08-05 PROCEDURE — 2500000003 HC RX 250 WO HCPCS: Performed by: INTERNAL MEDICINE

## 2023-08-05 PROCEDURE — 6370000000 HC RX 637 (ALT 250 FOR IP): Performed by: STUDENT IN AN ORGANIZED HEALTH CARE EDUCATION/TRAINING PROGRAM

## 2023-08-05 PROCEDURE — 6370000000 HC RX 637 (ALT 250 FOR IP): Performed by: INTERNAL MEDICINE

## 2023-08-05 PROCEDURE — 6360000002 HC RX W HCPCS: Performed by: STUDENT IN AN ORGANIZED HEALTH CARE EDUCATION/TRAINING PROGRAM

## 2023-08-05 PROCEDURE — 36415 COLL VENOUS BLD VENIPUNCTURE: CPT

## 2023-08-05 PROCEDURE — 82962 GLUCOSE BLOOD TEST: CPT

## 2023-08-05 PROCEDURE — 1200000000 HC SEMI PRIVATE

## 2023-08-05 PROCEDURE — 94761 N-INVAS EAR/PLS OXIMETRY MLT: CPT

## 2023-08-05 PROCEDURE — 85025 COMPLETE CBC W/AUTO DIFF WBC: CPT

## 2023-08-05 RX ORDER — AMLODIPINE BESYLATE 5 MG/1
5 TABLET ORAL DAILY
Status: DISCONTINUED | OUTPATIENT
Start: 2023-08-05 | End: 2023-08-06 | Stop reason: HOSPADM

## 2023-08-05 RX ORDER — HYDRALAZINE HYDROCHLORIDE 25 MG/1
25 TABLET, FILM COATED ORAL EVERY 8 HOURS SCHEDULED
Status: DISCONTINUED | OUTPATIENT
Start: 2023-08-05 | End: 2023-08-06 | Stop reason: HOSPADM

## 2023-08-05 RX ADMIN — OXYCODONE AND ACETAMINOPHEN 1 TABLET: 7.5; 325 TABLET ORAL at 18:04

## 2023-08-05 RX ADMIN — HEPARIN SODIUM 5000 UNITS: 5000 INJECTION INTRAVENOUS; SUBCUTANEOUS at 18:04

## 2023-08-05 RX ADMIN — AMLODIPINE BESYLATE 5 MG: 5 TABLET ORAL at 10:55

## 2023-08-05 RX ADMIN — CARVEDILOL 25 MG: 25 TABLET, FILM COATED ORAL at 10:49

## 2023-08-05 RX ADMIN — OXYCODONE AND ACETAMINOPHEN 1 TABLET: 7.5; 325 TABLET ORAL at 10:48

## 2023-08-05 RX ADMIN — SULFAMETHOXAZOLE AND TRIMETHOPRIM 1 TABLET: 400; 80 TABLET ORAL at 10:50

## 2023-08-05 RX ADMIN — ARIPIPRAZOLE 5 MG: 10 TABLET ORAL at 21:52

## 2023-08-05 RX ADMIN — ONDANSETRON 4 MG: 2 INJECTION INTRAMUSCULAR; INTRAVENOUS at 22:27

## 2023-08-05 RX ADMIN — CALCITRIOL CAPSULES 0.25 MCG 0.25 MCG: 0.25 CAPSULE ORAL at 10:50

## 2023-08-05 RX ADMIN — CALCIUM ACETATE 667 MG: 667 CAPSULE ORAL at 10:49

## 2023-08-05 RX ADMIN — HYDROMORPHONE HYDROCHLORIDE 0.25 MG: 1 INJECTION, SOLUTION INTRAMUSCULAR; INTRAVENOUS; SUBCUTANEOUS at 22:24

## 2023-08-05 RX ADMIN — CARVEDILOL 25 MG: 25 TABLET, FILM COATED ORAL at 18:03

## 2023-08-05 RX ADMIN — LABETALOL HYDROCHLORIDE 10 MG: 5 INJECTION, SOLUTION INTRAVENOUS at 22:30

## 2023-08-05 RX ADMIN — CALCIUM ACETATE 667 MG: 667 CAPSULE ORAL at 18:04

## 2023-08-05 RX ADMIN — ATORVASTATIN CALCIUM 40 MG: 40 TABLET, FILM COATED ORAL at 21:52

## 2023-08-05 RX ADMIN — FAMOTIDINE 20 MG: 20 TABLET ORAL at 10:49

## 2023-08-05 RX ADMIN — ASPIRIN 81 MG: 81 TABLET, CHEWABLE ORAL at 10:50

## 2023-08-05 RX ADMIN — SULFAMETHOXAZOLE AND TRIMETHOPRIM 1 TABLET: 400; 80 TABLET ORAL at 21:52

## 2023-08-05 RX ADMIN — HEPARIN SODIUM 5000 UNITS: 5000 INJECTION INTRAVENOUS; SUBCUTANEOUS at 10:50

## 2023-08-05 ASSESSMENT — PAIN - FUNCTIONAL ASSESSMENT
PAIN_FUNCTIONAL_ASSESSMENT: ACTIVITIES ARE NOT PREVENTED
PAIN_FUNCTIONAL_ASSESSMENT: PREVENTS OR INTERFERES SOME ACTIVE ACTIVITIES AND ADLS
PAIN_FUNCTIONAL_ASSESSMENT: PREVENTS OR INTERFERES SOME ACTIVE ACTIVITIES AND ADLS

## 2023-08-05 ASSESSMENT — PAIN SCALES - GENERAL
PAINLEVEL_OUTOF10: 7
PAINLEVEL_OUTOF10: 7
PAINLEVEL_OUTOF10: 0
PAINLEVEL_OUTOF10: 7
PAINLEVEL_OUTOF10: 0

## 2023-08-05 ASSESSMENT — PAIN DESCRIPTION - ORIENTATION
ORIENTATION: LEFT

## 2023-08-05 ASSESSMENT — PAIN DESCRIPTION - LOCATION
LOCATION: FOOT

## 2023-08-05 ASSESSMENT — PAIN DESCRIPTION - FREQUENCY: FREQUENCY: CONTINUOUS

## 2023-08-05 ASSESSMENT — PAIN DESCRIPTION - PAIN TYPE: TYPE: SURGICAL PAIN

## 2023-08-05 ASSESSMENT — PAIN DESCRIPTION - DESCRIPTORS
DESCRIPTORS: ACHING
DESCRIPTORS: ACHING;DISCOMFORT
DESCRIPTORS: ACHING;THROBBING

## 2023-08-05 ASSESSMENT — PAIN DESCRIPTION - ONSET: ONSET: ON-GOING

## 2023-08-05 ASSESSMENT — PAIN SCALES - WONG BAKER: WONGBAKER_NUMERICALRESPONSE: 0

## 2023-08-06 VITALS
OXYGEN SATURATION: 96 % | WEIGHT: 255.3 LBS | HEART RATE: 76 BPM | DIASTOLIC BLOOD PRESSURE: 103 MMHG | TEMPERATURE: 97.8 F | RESPIRATION RATE: 16 BRPM | HEIGHT: 69 IN | BODY MASS INDEX: 37.81 KG/M2 | SYSTOLIC BLOOD PRESSURE: 171 MMHG

## 2023-08-06 LAB
CULTURE: NORMAL
CULTURE: NORMAL
GLUCOSE BLD-MCNC: 151 MG/DL (ref 70–99)
Lab: NORMAL
Lab: NORMAL
SPECIMEN: NORMAL
SPECIMEN: NORMAL

## 2023-08-06 PROCEDURE — 6370000000 HC RX 637 (ALT 250 FOR IP): Performed by: INTERNAL MEDICINE

## 2023-08-06 PROCEDURE — 82962 GLUCOSE BLOOD TEST: CPT

## 2023-08-06 PROCEDURE — 6370000000 HC RX 637 (ALT 250 FOR IP): Performed by: STUDENT IN AN ORGANIZED HEALTH CARE EDUCATION/TRAINING PROGRAM

## 2023-08-06 PROCEDURE — 2500000003 HC RX 250 WO HCPCS: Performed by: INTERNAL MEDICINE

## 2023-08-06 PROCEDURE — 2580000003 HC RX 258: Performed by: STUDENT IN AN ORGANIZED HEALTH CARE EDUCATION/TRAINING PROGRAM

## 2023-08-06 PROCEDURE — 6360000002 HC RX W HCPCS: Performed by: STUDENT IN AN ORGANIZED HEALTH CARE EDUCATION/TRAINING PROGRAM

## 2023-08-06 RX ORDER — CALCIUM ACETATE 667 MG/1
1 CAPSULE ORAL
Qty: 180 CAPSULE | Refills: 0 | Status: SHIPPED | OUTPATIENT
Start: 2023-08-06

## 2023-08-06 RX ORDER — INSULIN LISPRO 100 [IU]/ML
0-4 INJECTION, SOLUTION INTRAVENOUS; SUBCUTANEOUS
Qty: 1 ADJUSTABLE DOSE PRE-FILLED PEN SYRINGE | Refills: 2 | Status: SHIPPED | OUTPATIENT
Start: 2023-08-06

## 2023-08-06 RX ORDER — SULFAMETHOXAZOLE AND TRIMETHOPRIM 400; 80 MG/1; MG/1
1 TABLET ORAL EVERY 12 HOURS SCHEDULED
Qty: 81 TABLET | Refills: 0 | Status: CANCELLED | OUTPATIENT
Start: 2023-08-06 | End: 2023-09-16

## 2023-08-06 RX ORDER — INSULIN LISPRO 100 [IU]/ML
0-4 INJECTION, SOLUTION INTRAVENOUS; SUBCUTANEOUS
Qty: 4.8 ML | Refills: 0 | Status: SHIPPED | OUTPATIENT
Start: 2023-08-06 | End: 2023-09-05

## 2023-08-06 RX ORDER — AMLODIPINE BESYLATE 5 MG/1
5 TABLET ORAL DAILY
Qty: 30 TABLET | Refills: 3 | Status: SHIPPED | OUTPATIENT
Start: 2023-08-06

## 2023-08-06 RX ORDER — FAMOTIDINE 20 MG/1
20 TABLET, FILM COATED ORAL DAILY
Qty: 60 TABLET | Refills: 3 | Status: SHIPPED | OUTPATIENT
Start: 2023-08-06

## 2023-08-06 RX ORDER — HYDRALAZINE HYDROCHLORIDE 25 MG/1
25 TABLET, FILM COATED ORAL EVERY 8 HOURS SCHEDULED
Qty: 90 TABLET | Refills: 3 | Status: CANCELLED | OUTPATIENT
Start: 2023-08-06

## 2023-08-06 RX ORDER — SULFAMETHOXAZOLE AND TRIMETHOPRIM 400; 80 MG/1; MG/1
1 TABLET ORAL EVERY 12 HOURS SCHEDULED
Qty: 81 TABLET | Refills: 0 | Status: SHIPPED | OUTPATIENT
Start: 2023-08-06 | End: 2023-09-16

## 2023-08-06 RX ORDER — FAMOTIDINE 20 MG/1
20 TABLET, FILM COATED ORAL DAILY
Qty: 60 TABLET | Refills: 3 | Status: CANCELLED | OUTPATIENT
Start: 2023-08-06

## 2023-08-06 RX ORDER — AMLODIPINE BESYLATE 5 MG/1
5 TABLET ORAL DAILY
Qty: 30 TABLET | Refills: 3 | Status: CANCELLED | OUTPATIENT
Start: 2023-08-06

## 2023-08-06 RX ORDER — HYDRALAZINE HYDROCHLORIDE 25 MG/1
25 TABLET, FILM COATED ORAL EVERY 8 HOURS SCHEDULED
Qty: 90 TABLET | Refills: 3 | Status: SHIPPED | OUTPATIENT
Start: 2023-08-06

## 2023-08-06 RX ORDER — CALCIUM ACETATE 667 MG/1
1 CAPSULE ORAL
Qty: 180 CAPSULE | Refills: 0 | Status: CANCELLED | OUTPATIENT
Start: 2023-08-06

## 2023-08-06 RX ADMIN — HYDRALAZINE HYDROCHLORIDE 25 MG: 25 TABLET, FILM COATED ORAL at 00:26

## 2023-08-06 RX ADMIN — OXYCODONE AND ACETAMINOPHEN 1 TABLET: 7.5; 325 TABLET ORAL at 05:18

## 2023-08-06 RX ADMIN — CALCIUM ACETATE 667 MG: 667 CAPSULE ORAL at 10:03

## 2023-08-06 RX ADMIN — ASPIRIN 81 MG: 81 TABLET, CHEWABLE ORAL at 10:03

## 2023-08-06 RX ADMIN — CALCITRIOL CAPSULES 0.25 MCG 0.25 MCG: 0.25 CAPSULE ORAL at 10:02

## 2023-08-06 RX ADMIN — FAMOTIDINE 20 MG: 20 TABLET ORAL at 10:02

## 2023-08-06 RX ADMIN — SULFAMETHOXAZOLE AND TRIMETHOPRIM 1 TABLET: 400; 80 TABLET ORAL at 10:06

## 2023-08-06 RX ADMIN — SODIUM CHLORIDE, PRESERVATIVE FREE 10 ML: 5 INJECTION INTRAVENOUS at 10:03

## 2023-08-06 RX ADMIN — CARVEDILOL 25 MG: 25 TABLET, FILM COATED ORAL at 10:03

## 2023-08-06 RX ADMIN — AMLODIPINE BESYLATE 5 MG: 5 TABLET ORAL at 10:02

## 2023-08-06 RX ADMIN — HEPARIN SODIUM 5000 UNITS: 5000 INJECTION INTRAVENOUS; SUBCUTANEOUS at 00:08

## 2023-08-06 RX ADMIN — HYDRALAZINE HYDROCHLORIDE 25 MG: 25 TABLET, FILM COATED ORAL at 05:19

## 2023-08-06 ASSESSMENT — PAIN SCALES - WONG BAKER
WONGBAKER_NUMERICALRESPONSE: 0

## 2023-08-06 ASSESSMENT — PAIN DESCRIPTION - ORIENTATION: ORIENTATION: LEFT

## 2023-08-06 ASSESSMENT — PAIN SCALES - GENERAL
PAINLEVEL_OUTOF10: 0
PAINLEVEL_OUTOF10: 6
PAINLEVEL_OUTOF10: 0
PAINLEVEL_OUTOF10: 0

## 2023-08-06 ASSESSMENT — PAIN DESCRIPTION - LOCATION: LOCATION: FOOT

## 2023-08-06 ASSESSMENT — PAIN DESCRIPTION - DESCRIPTORS: DESCRIPTORS: ACHING;DISCOMFORT;BURNING

## 2023-08-06 NOTE — DISCHARGE SUMMARY
fluid collection in the musculature or deep compartment. Atherosclerotic calcifications and plaque at the distal superficial femoral artery, popliteal artery, and trifurcation in the calf arteries. There is heavy calcification of the posterior tibial artery. However this is not a CTA and limited arterial evaluation of the calf arteries. There is subcutaneous edema in the upper calf down through the ankle. Steady increase of the degree of edema and non loculated fluid around the ankle and into the proximal foot. There is no soft tissue emphysema at the calf. No radiopaque foreign bodies. 1.  Subcutaneous edema in the calf with steady worsening around the ankle and into the foot. No loculated fluid collection and no soft tissue emphysema. There is some fatty atrophy of the posterior musculature but no abscess or hematoma. 2.  No acute fracture, cortical erosion, or periosteal reaction at the tibia and fibula. The alignment of the knee and ankle are maintained. XR CHEST PORTABLE    Result Date: 8/3/2023  EXAMINATION: ONE XRAY VIEW OF THE CHEST 8/3/2023 4:49 pm COMPARISON: None. HISTORY: ORDERING SYSTEM PROVIDED HISTORY: Shortness of breath TECHNOLOGIST PROVIDED HISTORY: Reason for exam:->Shortness of breath Reason for Exam: Shortness of breath Additional signs and symptoms: na Relevant Medical/Surgical History: CAD, diabetes FINDINGS: No infiltrate or consolidation or effusion is identified. The heart size is normal.     No acute abnormality visualized. CT FOOT LEFT W CONTRAST    Result Date: 8/1/2023  EXAMINATION: CT OF THE LEFT FOOT WITH CONTRAST 8/1/2023 12:41 am TECHNIQUE: CT of the left foot was performed with the administration of intravenous contrast.  Multiplanar reformatted images are provided for review. Automated exposure control, iterative reconstruction, and/or weight based adjustment of the mA/kV was utilized to reduce the radiation dose to as low as reasonably achievable.  COMPARISON:

## 2023-08-06 NOTE — DISCHARGE INSTRUCTIONS
Please take your Medications regularly and set up appointments to follow up with your Primary Care Physician, Infectious Disease Specialist and Genral Surgeon soon  If having any new, persistent or worsening symptoms including but not limited to  persistent, worsening or new pain, fever/ chills etc ,please return to nearest facility for evaluation  Please check fingerstick glucose before meals and at bedtime. Maintain hypoglycemia protocol. If patient's fingerstick glucose is less than 120, please hold the Lantus dose. Fingerstick glucose are less than 110s, may need to hold oral diabetic medicine. If having hypoglycemia symptoms and fingerstick glucose readings are less than 100, patient needs as needed juices/glucose tablets.  In case of glucose reading greater than 300s or less than 100s, patient need to be evaluated by physician    Please go through Printed reading material and feel free to reach out in case of any queries, concerns or issues per contact provided

## 2023-08-21 NOTE — DISCHARGE INSTRUCTIONS
PHYSICIAN ORDERS AND DISCHARGE INSTRUCTIONS    NOTE: Upon discharge from the  Medical [facility], you will receive a patient experience survey. We would be grateful if you would take the time to fill this survey out. Wound care order history:     BAIRON's   Right       Left    Date:   Cultures:     Grafts:     Antibiotics:        Continuing wound care orders and information:                Residence:                Continue home health care with:    Your wound-care supplies will be provided by: Wound cleansing:     Do not scrub or use excessive force. Wash hands with soap and water before and after dressing changes. Prior to applying a clean dressing, cleanse wound with normal saline, wound cleanser, or mild soap and water. Ask the physician or nurse before getting the wound(s) wet in a shower. General Wound management:   Keep weight off wounds and reposition every 2 hours. Avoid standing for long periods of time. Apply wraps/stockings in AM and remove at bedtime. If swelling is present, elevate legs to the level of the heart or above for 30 minutes 4-5 times a day and/or when sitting. When taking antibiotics take entire prescription as ordered by physician do not stop taking until medicine is all gone. Increase protein intake to promote healing. Orders for this week: 8/22/23    Home care referral for skilled nursing wound care made to [facility] on 8/23/23    Rx: Santyl sent to [facility]    Left Plantar Foot Wound - Wash with soap and water, pat dry. Apply Santyl to wound bed. Cover with ca alginate and ABD. Wrap with conform and Coban. Change on Friday. Give forefoot offloading shoe today in clinic (8/22/23)      Dispense 30 day quantity when ordering supplies. Follow up with Dr Erendira He in 1 week(s) in the wound care center. Call [phone] for any questions or concerns.   Date__________   Time____________

## 2023-08-22 ENCOUNTER — HOSPITAL ENCOUNTER (OUTPATIENT)
Dept: WOUND CARE | Age: 48
Discharge: HOME | End: 2023-08-22
Payer: MEDICARE

## 2023-08-22 VITALS
DIASTOLIC BLOOD PRESSURE: 79 MMHG | SYSTOLIC BLOOD PRESSURE: 118 MMHG | HEART RATE: 86 BPM | TEMPERATURE: 97 F | RESPIRATION RATE: 16 BRPM

## 2023-08-22 PROCEDURE — 99213 OFFICE O/P EST LOW 20 MIN: CPT

## 2023-08-22 PROCEDURE — 11043 DBRDMT MUSC&/FSCA 1ST 20/<: CPT

## 2023-08-22 PROCEDURE — 11042 DBRDMT SUBQ TIS 1ST 20SQCM/<: CPT

## 2023-08-22 ASSESSMENT — PAIN DESCRIPTION - ONSET: ONSET: ON-GOING

## 2023-08-22 ASSESSMENT — PAIN - FUNCTIONAL ASSESSMENT: PAIN_FUNCTIONAL_ASSESSMENT: PREVENTS OR INTERFERES SOME ACTIVE ACTIVITIES AND ADLS

## 2023-08-22 ASSESSMENT — PAIN DESCRIPTION - LOCATION: LOCATION: FOOT

## 2023-08-22 ASSESSMENT — PAIN DESCRIPTION - ORIENTATION: ORIENTATION: LEFT

## 2023-08-22 ASSESSMENT — PAIN DESCRIPTION - FREQUENCY: FREQUENCY: CONTINUOUS

## 2023-08-22 ASSESSMENT — PAIN SCALES - GENERAL: PAINLEVEL_OUTOF10: 7

## 2023-08-22 ASSESSMENT — PAIN DESCRIPTION - DESCRIPTORS: DESCRIPTORS: SHARP

## 2023-08-22 ASSESSMENT — PAIN DESCRIPTION - PAIN TYPE: TYPE: CHRONIC PAIN

## 2023-08-22 NOTE — PROGRESS NOTES
bring with him on his subsequent visit.   Told him it is too expensive not to get it.  -Any concerns for the next visit go to the emergency room  -Follow-up 1 week for recheck       Electronically signed by Sheeba Freire DPM on 8/22/2023 at 4:23 PM

## 2023-08-29 ENCOUNTER — HOSPITAL ENCOUNTER (INPATIENT)
Age: 48
LOS: 14 days | Discharge: HOME | DRG: 853 | End: 2023-09-12
Attending: STUDENT IN AN ORGANIZED HEALTH CARE EDUCATION/TRAINING PROGRAM | Admitting: INTERNAL MEDICINE
Payer: MEDICARE

## 2023-08-29 ENCOUNTER — HOSPITAL ENCOUNTER (OUTPATIENT)
Dept: WOUND CARE | Age: 48
Discharge: HOME | End: 2023-08-29
Payer: MEDICARE

## 2023-08-29 ENCOUNTER — APPOINTMENT (OUTPATIENT)
Dept: ULTRASOUND IMAGING | Age: 48
DRG: 853 | End: 2023-08-29
Payer: MEDICARE

## 2023-08-29 ENCOUNTER — APPOINTMENT (OUTPATIENT)
Dept: GENERAL RADIOLOGY | Age: 48
DRG: 853 | End: 2023-08-29
Payer: MEDICARE

## 2023-08-29 VITALS
DIASTOLIC BLOOD PRESSURE: 72 MMHG | HEART RATE: 106 BPM | RESPIRATION RATE: 17 BRPM | SYSTOLIC BLOOD PRESSURE: 121 MMHG | TEMPERATURE: 99.4 F

## 2023-08-29 DIAGNOSIS — J18.9 PNEUMONIA OF RIGHT LOWER LOBE DUE TO INFECTIOUS ORGANISM: ICD-10-CM

## 2023-08-29 DIAGNOSIS — B99.9 INFECTION: ICD-10-CM

## 2023-08-29 DIAGNOSIS — E87.5 HYPERKALEMIA: ICD-10-CM

## 2023-08-29 DIAGNOSIS — N18.6 ESRD (END STAGE RENAL DISEASE) (HCC): ICD-10-CM

## 2023-08-29 DIAGNOSIS — L03.116 CELLULITIS OF LEFT LOWER EXTREMITY: Primary | ICD-10-CM

## 2023-08-29 DIAGNOSIS — L97.429 DIABETIC ULCER OF LEFT MIDFOOT ASSOCIATED WITH TYPE 2 DIABETES MELLITUS, UNSPECIFIED ULCER STAGE (HCC): ICD-10-CM

## 2023-08-29 DIAGNOSIS — E11.621 DIABETIC ULCER OF LEFT MIDFOOT ASSOCIATED WITH TYPE 2 DIABETES MELLITUS, UNSPECIFIED ULCER STAGE (HCC): ICD-10-CM

## 2023-08-29 DIAGNOSIS — K92.1 MELENA: ICD-10-CM

## 2023-08-29 LAB
ANION GAP SERPL CALCULATED.3IONS-SCNC: 20 MMOL/L (ref 4–16)
BASOPHILS ABSOLUTE: 0 K/CU MM
BASOPHILS RELATIVE PERCENT: 0.3 % (ref 0–1)
BUN SERPL-MCNC: 57 MG/DL (ref 6–23)
CALCIUM SERPL-MCNC: 7 MG/DL (ref 8.3–10.6)
CHLORIDE BLD-SCNC: 90 MMOL/L (ref 99–110)
CO2: 21 MMOL/L (ref 21–32)
CREAT SERPL-MCNC: 8.4 MG/DL (ref 0.9–1.3)
CRP SERPL HS-MCNC: 284.5 MG/L
DIFFERENTIAL TYPE: ABNORMAL
EOSINOPHILS ABSOLUTE: 0.2 K/CU MM
EOSINOPHILS RELATIVE PERCENT: 1 % (ref 0–3)
ERYTHROCYTE SEDIMENTATION RATE: 54 MM/HR (ref 0–15)
GFR SERPL CREATININE-BSD FRML MDRD: 7 ML/MIN/1.73M2
GLUCOSE BLD-MCNC: 198 MG/DL (ref 70–99)
GLUCOSE SERPL-MCNC: 161 MG/DL (ref 70–99)
HCT VFR BLD CALC: 26.1 % (ref 42–52)
HEMOGLOBIN: 8.3 GM/DL (ref 13.5–18)
IMMATURE NEUTROPHIL %: 0.5 % (ref 0–0.43)
INFLUENZA A ANTIGEN: NOT DETECTED
INFLUENZA B ANTIGEN: NOT DETECTED
LACTIC ACID, SEPSIS: 1.1 MMOL/L (ref 0.5–1.9)
LYMPHOCYTES ABSOLUTE: 0.6 K/CU MM
LYMPHOCYTES RELATIVE PERCENT: 3.7 % (ref 24–44)
MCH RBC QN AUTO: 29.3 PG (ref 27–31)
MCHC RBC AUTO-ENTMCNC: 31.8 % (ref 32–36)
MCV RBC AUTO: 92.2 FL (ref 78–100)
MONOCYTES ABSOLUTE: 1 K/CU MM
MONOCYTES RELATIVE PERCENT: 6.8 % (ref 0–4)
NUCLEATED RBC %: 0 %
PDW BLD-RTO: 17 % (ref 11.7–14.9)
PLATELET # BLD: 227 K/CU MM (ref 140–440)
PMV BLD AUTO: 9.8 FL (ref 7.5–11.1)
POTASSIUM SERPL-SCNC: 5.2 MMOL/L (ref 3.5–5.1)
PROCALCITONIN SERPL-MCNC: 1.88 NG/ML
RBC # BLD: 2.83 M/CU MM (ref 4.6–6.2)
SARS-COV-2 RDRP RESP QL NAA+PROBE: NOT DETECTED
SEGMENTED NEUTROPHILS ABSOLUTE COUNT: 13.4 K/CU MM
SEGMENTED NEUTROPHILS RELATIVE PERCENT: 87.7 % (ref 36–66)
SODIUM BLD-SCNC: 131 MMOL/L (ref 135–145)
SOURCE: NORMAL
TOTAL IMMATURE NEUTOROPHIL: 0.07 K/CU MM
TOTAL NUCLEATED RBC: 0 K/CU MM
WBC # BLD: 15.3 K/CU MM (ref 4–10.5)

## 2023-08-29 PROCEDURE — 87070 CULTURE OTHR SPECIMN AEROBIC: CPT

## 2023-08-29 PROCEDURE — 86140 C-REACTIVE PROTEIN: CPT

## 2023-08-29 PROCEDURE — 93971 EXTREMITY STUDY: CPT

## 2023-08-29 PROCEDURE — 96375 TX/PRO/DX INJ NEW DRUG ADDON: CPT

## 2023-08-29 PROCEDURE — 87502 INFLUENZA DNA AMP PROBE: CPT

## 2023-08-29 PROCEDURE — 84145 PROCALCITONIN (PCT): CPT

## 2023-08-29 PROCEDURE — 87635 SARS-COV-2 COVID-19 AMP PRB: CPT

## 2023-08-29 PROCEDURE — 87077 CULTURE AEROBIC IDENTIFY: CPT

## 2023-08-29 PROCEDURE — 6370000000 HC RX 637 (ALT 250 FOR IP): Performed by: INTERNAL MEDICINE

## 2023-08-29 PROCEDURE — 96365 THER/PROPH/DIAG IV INF INIT: CPT

## 2023-08-29 PROCEDURE — 87040 BLOOD CULTURE FOR BACTERIA: CPT

## 2023-08-29 PROCEDURE — 6360000002 HC RX W HCPCS: Performed by: STUDENT IN AN ORGANIZED HEALTH CARE EDUCATION/TRAINING PROGRAM

## 2023-08-29 PROCEDURE — 99285 EMERGENCY DEPT VISIT HI MDM: CPT

## 2023-08-29 PROCEDURE — 83605 ASSAY OF LACTIC ACID: CPT

## 2023-08-29 PROCEDURE — 87075 CULTR BACTERIA EXCEPT BLOOD: CPT

## 2023-08-29 PROCEDURE — 85025 COMPLETE CBC W/AUTO DIFF WBC: CPT

## 2023-08-29 PROCEDURE — 2580000003 HC RX 258: Performed by: INTERNAL MEDICINE

## 2023-08-29 PROCEDURE — 82962 GLUCOSE BLOOD TEST: CPT

## 2023-08-29 PROCEDURE — 87186 SC STD MICRODIL/AGAR DIL: CPT

## 2023-08-29 PROCEDURE — 93005 ELECTROCARDIOGRAM TRACING: CPT | Performed by: STUDENT IN AN ORGANIZED HEALTH CARE EDUCATION/TRAINING PROGRAM

## 2023-08-29 PROCEDURE — 87076 CULTURE ANAEROBE IDENT EACH: CPT

## 2023-08-29 PROCEDURE — 80048 BASIC METABOLIC PNL TOTAL CA: CPT

## 2023-08-29 PROCEDURE — 85652 RBC SED RATE AUTOMATED: CPT

## 2023-08-29 PROCEDURE — 71046 X-RAY EXAM CHEST 2 VIEWS: CPT

## 2023-08-29 PROCEDURE — 6370000000 HC RX 637 (ALT 250 FOR IP): Performed by: STUDENT IN AN ORGANIZED HEALTH CARE EDUCATION/TRAINING PROGRAM

## 2023-08-29 PROCEDURE — 73630 X-RAY EXAM OF FOOT: CPT

## 2023-08-29 PROCEDURE — 1200000000 HC SEMI PRIVATE

## 2023-08-29 PROCEDURE — 11042 DBRDMT SUBQ TIS 1ST 20SQCM/<: CPT

## 2023-08-29 PROCEDURE — 6360000002 HC RX W HCPCS: Performed by: INTERNAL MEDICINE

## 2023-08-29 PROCEDURE — 2580000003 HC RX 258: Performed by: STUDENT IN AN ORGANIZED HEALTH CARE EDUCATION/TRAINING PROGRAM

## 2023-08-29 RX ORDER — GLUCAGON 1 MG/ML
1 KIT INJECTION PRN
Status: DISCONTINUED | OUTPATIENT
Start: 2023-08-29 | End: 2023-09-12 | Stop reason: HOSPADM

## 2023-08-29 RX ORDER — SODIUM CHLORIDE 0.9 % (FLUSH) 0.9 %
5-40 SYRINGE (ML) INJECTION EVERY 12 HOURS SCHEDULED
Status: DISCONTINUED | OUTPATIENT
Start: 2023-08-29 | End: 2023-09-12 | Stop reason: HOSPADM

## 2023-08-29 RX ORDER — INSULIN LISPRO 100 [IU]/ML
0-4 INJECTION, SOLUTION INTRAVENOUS; SUBCUTANEOUS NIGHTLY
Status: DISCONTINUED | OUTPATIENT
Start: 2023-08-29 | End: 2023-09-12 | Stop reason: HOSPADM

## 2023-08-29 RX ORDER — OXYCODONE AND ACETAMINOPHEN 7.5; 325 MG/1; MG/1
1 TABLET ORAL EVERY 6 HOURS PRN
Status: DISCONTINUED | OUTPATIENT
Start: 2023-08-29 | End: 2023-09-06

## 2023-08-29 RX ORDER — AMLODIPINE BESYLATE 5 MG/1
5 TABLET ORAL DAILY
Status: DISCONTINUED | OUTPATIENT
Start: 2023-08-30 | End: 2023-09-12 | Stop reason: HOSPADM

## 2023-08-29 RX ORDER — DEXTROSE MONOHYDRATE 100 MG/ML
INJECTION, SOLUTION INTRAVENOUS CONTINUOUS PRN
Status: DISCONTINUED | OUTPATIENT
Start: 2023-08-29 | End: 2023-09-12 | Stop reason: HOSPADM

## 2023-08-29 RX ORDER — SODIUM CHLORIDE 9 MG/ML
INJECTION, SOLUTION INTRAVENOUS PRN
Status: DISCONTINUED | OUTPATIENT
Start: 2023-08-29 | End: 2023-09-12 | Stop reason: HOSPADM

## 2023-08-29 RX ORDER — ACETAMINOPHEN 650 MG/1
650 SUPPOSITORY RECTAL EVERY 6 HOURS PRN
Status: DISCONTINUED | OUTPATIENT
Start: 2023-08-29 | End: 2023-09-12 | Stop reason: HOSPADM

## 2023-08-29 RX ORDER — HYDROCODONE BITARTRATE AND ACETAMINOPHEN 5; 325 MG/1; MG/1
1 TABLET ORAL ONCE
Status: COMPLETED | OUTPATIENT
Start: 2023-08-29 | End: 2023-08-29

## 2023-08-29 RX ORDER — CALCIUM ACETATE 667 MG/1
1 CAPSULE ORAL
Status: DISCONTINUED | OUTPATIENT
Start: 2023-08-30 | End: 2023-09-12 | Stop reason: HOSPADM

## 2023-08-29 RX ORDER — INSULIN LISPRO 100 [IU]/ML
0-8 INJECTION, SOLUTION INTRAVENOUS; SUBCUTANEOUS
Status: DISCONTINUED | OUTPATIENT
Start: 2023-08-30 | End: 2023-09-12 | Stop reason: HOSPADM

## 2023-08-29 RX ORDER — ATORVASTATIN CALCIUM 40 MG/1
40 TABLET, FILM COATED ORAL NIGHTLY
Status: DISCONTINUED | OUTPATIENT
Start: 2023-08-29 | End: 2023-09-12 | Stop reason: HOSPADM

## 2023-08-29 RX ORDER — INSULIN GLARGINE 100 [IU]/ML
20 INJECTION, SOLUTION SUBCUTANEOUS NIGHTLY
Status: DISCONTINUED | OUTPATIENT
Start: 2023-08-29 | End: 2023-09-03

## 2023-08-29 RX ORDER — ARIPIPRAZOLE 10 MG/1
5 TABLET ORAL NIGHTLY
Status: DISCONTINUED | OUTPATIENT
Start: 2023-08-29 | End: 2023-09-12 | Stop reason: HOSPADM

## 2023-08-29 RX ORDER — CALCITRIOL 0.25 UG/1
0.5 CAPSULE, LIQUID FILLED ORAL DAILY
Status: DISCONTINUED | OUTPATIENT
Start: 2023-08-30 | End: 2023-09-12 | Stop reason: HOSPADM

## 2023-08-29 RX ORDER — FAMOTIDINE 20 MG/1
10 TABLET, FILM COATED ORAL DAILY
Status: DISCONTINUED | OUTPATIENT
Start: 2023-08-30 | End: 2023-09-02

## 2023-08-29 RX ORDER — HYDRALAZINE HYDROCHLORIDE 25 MG/1
25 TABLET, FILM COATED ORAL EVERY 8 HOURS SCHEDULED
Status: DISCONTINUED | OUTPATIENT
Start: 2023-08-29 | End: 2023-09-12 | Stop reason: HOSPADM

## 2023-08-29 RX ORDER — SODIUM CHLORIDE 0.9 % (FLUSH) 0.9 %
5-40 SYRINGE (ML) INJECTION PRN
Status: DISCONTINUED | OUTPATIENT
Start: 2023-08-29 | End: 2023-09-12 | Stop reason: HOSPADM

## 2023-08-29 RX ORDER — HEPARIN SODIUM 5000 [USP'U]/ML
5000 INJECTION, SOLUTION INTRAVENOUS; SUBCUTANEOUS EVERY 8 HOURS SCHEDULED
Status: DISCONTINUED | OUTPATIENT
Start: 2023-08-29 | End: 2023-09-06

## 2023-08-29 RX ORDER — CARVEDILOL 25 MG/1
25 TABLET ORAL 2 TIMES DAILY WITH MEALS
Status: DISCONTINUED | OUTPATIENT
Start: 2023-08-30 | End: 2023-09-12 | Stop reason: HOSPADM

## 2023-08-29 RX ORDER — ACETAMINOPHEN 325 MG/1
650 TABLET ORAL EVERY 6 HOURS PRN
Status: DISCONTINUED | OUTPATIENT
Start: 2023-08-29 | End: 2023-09-12 | Stop reason: HOSPADM

## 2023-08-29 RX ORDER — SODIUM CHLORIDE 9 MG/ML
INJECTION, SOLUTION INTRAVENOUS CONTINUOUS
Status: DISCONTINUED | OUTPATIENT
Start: 2023-08-29 | End: 2023-08-30

## 2023-08-29 RX ADMIN — INSULIN GLARGINE 20 UNITS: 100 INJECTION, SOLUTION SUBCUTANEOUS at 23:13

## 2023-08-29 RX ADMIN — ARIPIPRAZOLE 5 MG: 10 TABLET ORAL at 23:12

## 2023-08-29 RX ADMIN — OXYCODONE AND ACETAMINOPHEN 1 TABLET: 7.5; 325 TABLET ORAL at 23:14

## 2023-08-29 RX ADMIN — SODIUM CHLORIDE: 9 INJECTION, SOLUTION INTRAVENOUS at 23:20

## 2023-08-29 RX ADMIN — HYDROCODONE BITARTRATE AND ACETAMINOPHEN 1 TABLET: 5; 325 TABLET ORAL at 19:03

## 2023-08-29 RX ADMIN — HYDRALAZINE HYDROCHLORIDE 25 MG: 25 TABLET, FILM COATED ORAL at 23:12

## 2023-08-29 RX ADMIN — HEPARIN SODIUM 5000 UNITS: 5000 INJECTION INTRAVENOUS; SUBCUTANEOUS at 23:13

## 2023-08-29 RX ADMIN — CEFEPIME 2000 MG: 2 INJECTION, POWDER, FOR SOLUTION INTRAVENOUS at 19:01

## 2023-08-29 RX ADMIN — HYDROMORPHONE HYDROCHLORIDE 0.5 MG: 1 INJECTION, SOLUTION INTRAMUSCULAR; INTRAVENOUS; SUBCUTANEOUS at 20:16

## 2023-08-29 RX ADMIN — ATORVASTATIN CALCIUM 40 MG: 40 TABLET, FILM COATED ORAL at 23:12

## 2023-08-29 RX ADMIN — VANCOMYCIN HYDROCHLORIDE 2000 MG: 5 INJECTION, POWDER, LYOPHILIZED, FOR SOLUTION INTRAVENOUS at 20:38

## 2023-08-29 ASSESSMENT — PAIN DESCRIPTION - FREQUENCY
FREQUENCY: INTERMITTENT
FREQUENCY: INTERMITTENT

## 2023-08-29 ASSESSMENT — PAIN DESCRIPTION - LOCATION
LOCATION: FOOT

## 2023-08-29 ASSESSMENT — PAIN SCALES - GENERAL
PAINLEVEL_OUTOF10: 2
PAINLEVEL_OUTOF10: 10
PAINLEVEL_OUTOF10: 2
PAINLEVEL_OUTOF10: 0
PAINLEVEL_OUTOF10: 8
PAINLEVEL_OUTOF10: 0
PAINLEVEL_OUTOF10: 8
PAINLEVEL_OUTOF10: 8
PAINLEVEL_OUTOF10: 10

## 2023-08-29 ASSESSMENT — PAIN DESCRIPTION - ORIENTATION
ORIENTATION: LEFT

## 2023-08-29 ASSESSMENT — PAIN SCALES - WONG BAKER
WONGBAKER_NUMERICALRESPONSE: 0
WONGBAKER_NUMERICALRESPONSE: 2

## 2023-08-29 ASSESSMENT — PAIN DESCRIPTION - DESCRIPTORS
DESCRIPTORS: ACHING
DESCRIPTORS: CRUSHING

## 2023-08-29 ASSESSMENT — PAIN DESCRIPTION - ONSET
ONSET: ON-GOING
ONSET: ON-GOING

## 2023-08-29 ASSESSMENT — PAIN DESCRIPTION - PAIN TYPE
TYPE: CHRONIC PAIN
TYPE: CHRONIC PAIN

## 2023-08-29 NOTE — DISCHARGE INSTRUCTIONS
Dispense 30 day quantity when ordering supplies. Follow up with Dr Ash Chatman in 1 week(s) in the wound care center. Call [phone] for any questions or concerns.   Date__________   Time____________

## 2023-08-29 NOTE — ED PROVIDER NOTES
Emergency Department Encounter        Pt Name: Cherylene Rima  MRN: [id number]  [address] [facility] 1975  Date of evaluation: 8/29/2023  ED Physician: William Patricia MD    CHIEF COMPLAINT     Triage Chief Complaint:   Wound Check (L foot. Patient sent over by wound center for fever and check on the wound)      HISTORY OF PRESENT ILLNESS & REVIEW OF SYSTEMS     History obtained from the patient. Cherylene Rima is a 50 y.o. male who presents to the emergency department for evaluation of wound check. Patient says that he has had a wound to his left lower extremity for about 6 months. Says he goes and sees wound care and then he has home health come and see him a few times a week for dressing changes. Says that they sent him in today because he has had a fever and he says he has been having more pain and then he also is having pain to his left calf. Mentions little bit of a cough. Denies any known sick contacts. Denies any headache chest pain shortness of breath abdominal pain nausea vomiting diarrhea constipation. Says that he is on dialysis Monday Wednesday Friday and did have dialysis yesterday without any issues. Denies dysuria        Patient denies any new Headache, Fever, Chills, Chest pain, Shortness of breath, Abdominal pain, Nausea, Vomiting, Diarrhea, and Constipation. The patient has no other acute complaints at this time. Review of systems as above.           PAST MED/SURG/SOCIAL/FAM HISTORY & ALLERGY & MEDICATIONS     Past Medical History:   Diagnosis Date    Abscess 2010    scrotal    Acute renal failure (ARF) ([address]) 08/04/2019    Anxiety associated with depression     Anxiety associated with depression     Back pain 07/02/2012    CAD (coronary artery disease) 2/10/2023    Chest pain 05/01/2013    Diabetic nephropathy ([address])     Diabetic neuropathy ([address]) 12/22/2011    Diabetic ulcer of left midfoot associated with type 2 diabetes mellitus, with fat layer exposed ([address]) 07/18/2017

## 2023-08-29 NOTE — CARE COORDINATION
Pt noted for possible readmission. Pt was recently admitted 7/31-8/6/23 for gangrene. Pt is from home with adult child, has insurance and PCP. Pt is active with [facility]. Pt returns for a wound check. Pt is being admitted for further treatment at this time. At this time no CM needs noted on chart review.

## 2023-08-30 ENCOUNTER — ANESTHESIA EVENT (OUTPATIENT)
Dept: OPERATING ROOM | Age: 48
End: 2023-08-30
Payer: MEDICARE

## 2023-08-30 ENCOUNTER — ANESTHESIA (OUTPATIENT)
Dept: OPERATING ROOM | Age: 48
End: 2023-08-30
Payer: MEDICARE

## 2023-08-30 LAB
ANION GAP SERPL CALCULATED.3IONS-SCNC: 22 MMOL/L (ref 4–16)
BACTERIA: NEGATIVE /HPF
BASOPHILS ABSOLUTE: 0.1 K/CU MM
BASOPHILS RELATIVE PERCENT: 0.4 % (ref 0–1)
BILIRUBIN URINE: NEGATIVE MG/DL
BLOOD, URINE: ABNORMAL
BUN SERPL-MCNC: 58 MG/DL (ref 6–23)
CALCIUM SERPL-MCNC: 6.4 MG/DL (ref 8.3–10.6)
CHLORIDE BLD-SCNC: 91 MMOL/L (ref 99–110)
CLARITY: CLEAR
CO2: 18 MMOL/L (ref 21–32)
COLOR: YELLOW
CREAT SERPL-MCNC: 8.8 MG/DL (ref 0.9–1.3)
DIFFERENTIAL TYPE: ABNORMAL
EOSINOPHILS ABSOLUTE: 0.2 K/CU MM
EOSINOPHILS RELATIVE PERCENT: 1.2 % (ref 0–3)
GFR SERPL CREATININE-BSD FRML MDRD: 7 ML/MIN/1.73M2
GLUCOSE BLD-MCNC: 119 MG/DL (ref 70–99)
GLUCOSE BLD-MCNC: 122 MG/DL (ref 70–99)
GLUCOSE BLD-MCNC: 129 MG/DL (ref 70–99)
GLUCOSE BLD-MCNC: 194 MG/DL (ref 70–99)
GLUCOSE SERPL-MCNC: 175 MG/DL (ref 70–99)
GLUCOSE, URINE: 250 MG/DL
HBV SURFACE AB SERPL IA-ACNC: <3.5 M[IU]/ML
HBV SURFACE AG SERPL QL IA: NON REACTIVE
HCT VFR BLD CALC: 27.5 % (ref 42–52)
HEMOGLOBIN: 8 GM/DL (ref 13.5–18)
IMMATURE NEUTROPHIL %: 0.5 % (ref 0–0.43)
KETONES, URINE: NEGATIVE MG/DL
LACTIC ACID, SEPSIS: 1.2 MMOL/L (ref 0.5–1.9)
LEUKOCYTE ESTERASE, URINE: NEGATIVE
LYMPHOCYTES ABSOLUTE: 0.9 K/CU MM
LYMPHOCYTES RELATIVE PERCENT: 6.9 % (ref 24–44)
MCH RBC QN AUTO: 30.1 PG (ref 27–31)
MCHC RBC AUTO-ENTMCNC: 29.1 % (ref 32–36)
MCV RBC AUTO: 103.4 FL (ref 78–100)
MONOCYTES ABSOLUTE: 1 K/CU MM
MONOCYTES RELATIVE PERCENT: 7.1 % (ref 0–4)
NITRITE URINE, QUANTITATIVE: NEGATIVE
NUCLEATED RBC %: 0 %
PDW BLD-RTO: 17.1 % (ref 11.7–14.9)
PH, URINE: 7 (ref 5–8)
PLATELET # BLD: 206 K/CU MM (ref 140–440)
PMV BLD AUTO: 10 FL (ref 7.5–11.1)
POTASSIUM SERPL-SCNC: 4.9 MMOL/L (ref 3.5–5.1)
PROTEIN UA: >300 MG/DL
RBC # BLD: 2.66 M/CU MM (ref 4.6–6.2)
RBC URINE: <1 /HPF (ref 0–3)
SEGMENTED NEUTROPHILS ABSOLUTE COUNT: 11.5 K/CU MM
SEGMENTED NEUTROPHILS RELATIVE PERCENT: 83.9 % (ref 36–66)
SODIUM BLD-SCNC: 131 MMOL/L (ref 135–145)
SPECIFIC GRAVITY UA: 1.01 (ref 1–1.03)
SQUAMOUS EPITHELIAL: 1 /HPF
TOTAL IMMATURE NEUTOROPHIL: 0.07 K/CU MM
TOTAL NUCLEATED RBC: 0 K/CU MM
TRICHOMONAS: NORMAL /HPF
UROBILINOGEN, URINE: 1 MG/DL (ref 0.2–1)
WBC # BLD: 13.7 K/CU MM (ref 4–10.5)
WBC UA: 2 /HPF (ref 0–2)

## 2023-08-30 PROCEDURE — 6360000002 HC RX W HCPCS: Performed by: SURGERY

## 2023-08-30 PROCEDURE — 6360000002 HC RX W HCPCS: Performed by: INTERNAL MEDICINE

## 2023-08-30 PROCEDURE — 82962 GLUCOSE BLOOD TEST: CPT

## 2023-08-30 PROCEDURE — 83605 ASSAY OF LACTIC ACID: CPT

## 2023-08-30 PROCEDURE — 86706 HEP B SURFACE ANTIBODY: CPT

## 2023-08-30 PROCEDURE — 6370000000 HC RX 637 (ALT 250 FOR IP): Performed by: INTERNAL MEDICINE

## 2023-08-30 PROCEDURE — 6370000000 HC RX 637 (ALT 250 FOR IP): Performed by: SURGERY

## 2023-08-30 PROCEDURE — 36415 COLL VENOUS BLD VENIPUNCTURE: CPT

## 2023-08-30 PROCEDURE — 99223 1ST HOSP IP/OBS HIGH 75: CPT | Performed by: INTERNAL MEDICINE

## 2023-08-30 PROCEDURE — APPSS60 APP SPLIT SHARED TIME 46-60 MINUTES: Performed by: NURSE PRACTITIONER

## 2023-08-30 PROCEDURE — 3700000000 HC ANESTHESIA ATTENDED CARE: Performed by: SURGERY

## 2023-08-30 PROCEDURE — 85025 COMPLETE CBC W/AUTO DIFF WBC: CPT

## 2023-08-30 PROCEDURE — 2580000003 HC RX 258: Performed by: INTERNAL MEDICINE

## 2023-08-30 PROCEDURE — 3700000001 HC ADD 15 MINUTES (ANESTHESIA): Performed by: SURGERY

## 2023-08-30 PROCEDURE — 2500000003 HC RX 250 WO HCPCS: Performed by: SURGERY

## 2023-08-30 PROCEDURE — 81001 URINALYSIS AUTO W/SCOPE: CPT

## 2023-08-30 PROCEDURE — 87340 HEPATITIS B SURFACE AG IA: CPT

## 2023-08-30 PROCEDURE — 80048 BASIC METABOLIC PNL TOTAL CA: CPT

## 2023-08-30 PROCEDURE — 0L9W0ZZ DRAINAGE OF LEFT FOOT TENDON, OPEN APPROACH: ICD-10-PCS | Performed by: SURGERY

## 2023-08-30 PROCEDURE — 94761 N-INVAS EAR/PLS OXIMETRY MLT: CPT

## 2023-08-30 PROCEDURE — 2709999900 HC NON-CHARGEABLE SUPPLY: Performed by: SURGERY

## 2023-08-30 PROCEDURE — 6360000002 HC RX W HCPCS: Performed by: NURSE ANESTHETIST, CERTIFIED REGISTERED

## 2023-08-30 PROCEDURE — 3600000012 HC SURGERY LEVEL 2 ADDTL 15MIN: Performed by: SURGERY

## 2023-08-30 PROCEDURE — 2500000003 HC RX 250 WO HCPCS: Performed by: INTERNAL MEDICINE

## 2023-08-30 PROCEDURE — 87075 CULTR BACTERIA EXCEPT BLOOD: CPT

## 2023-08-30 PROCEDURE — 6360000002 HC RX W HCPCS: Performed by: NURSE PRACTITIONER

## 2023-08-30 PROCEDURE — 87205 SMEAR GRAM STAIN: CPT

## 2023-08-30 PROCEDURE — 87077 CULTURE AEROBIC IDENTIFY: CPT

## 2023-08-30 PROCEDURE — 1200000000 HC SEMI PRIVATE

## 2023-08-30 PROCEDURE — 6360000002 HC RX W HCPCS: Performed by: FAMILY MEDICINE

## 2023-08-30 PROCEDURE — 3600000002 HC SURGERY LEVEL 2 BASE: Performed by: SURGERY

## 2023-08-30 PROCEDURE — 5A1D70Z PERFORMANCE OF URINARY FILTRATION, INTERMITTENT, LESS THAN 6 HOURS PER DAY: ICD-10-PCS | Performed by: INTERNAL MEDICINE

## 2023-08-30 PROCEDURE — 90935 HEMODIALYSIS ONE EVALUATION: CPT

## 2023-08-30 PROCEDURE — 87070 CULTURE OTHR SPECIMN AEROBIC: CPT

## 2023-08-30 PROCEDURE — 2580000003 HC RX 258: Performed by: SURGERY

## 2023-08-30 PROCEDURE — 2580000003 HC RX 258: Performed by: NURSE ANESTHETIST, CERTIFIED REGISTERED

## 2023-08-30 RX ORDER — HYDROCODONE BITARTRATE AND ACETAMINOPHEN 7.5; 325 MG/1; MG/1
1 TABLET ORAL EVERY 6 HOURS PRN
Status: DISCONTINUED | OUTPATIENT
Start: 2023-08-30 | End: 2023-09-02

## 2023-08-30 RX ORDER — SODIUM CHLORIDE 9 MG/ML
INJECTION, SOLUTION INTRAVENOUS CONTINUOUS PRN
Status: DISCONTINUED | OUTPATIENT
Start: 2023-08-30 | End: 2023-08-30 | Stop reason: SDUPTHER

## 2023-08-30 RX ORDER — PROPOFOL 10 MG/ML
INJECTION, EMULSION INTRAVENOUS PRN
Status: DISCONTINUED | OUTPATIENT
Start: 2023-08-30 | End: 2023-08-30 | Stop reason: SDUPTHER

## 2023-08-30 RX ORDER — ONDANSETRON 2 MG/ML
4 INJECTION INTRAMUSCULAR; INTRAVENOUS
Status: COMPLETED | OUTPATIENT
Start: 2023-08-30 | End: 2023-08-30

## 2023-08-30 RX ORDER — LIDOCAINE HYDROCHLORIDE 20 MG/ML
INJECTION, SOLUTION INTRAVENOUS PRN
Status: DISCONTINUED | OUTPATIENT
Start: 2023-08-30 | End: 2023-08-30 | Stop reason: SDUPTHER

## 2023-08-30 RX ORDER — CALCIUM GLUCONATE 20 MG/ML
2000 INJECTION, SOLUTION INTRAVENOUS ONCE
Status: COMPLETED | OUTPATIENT
Start: 2023-08-30 | End: 2023-08-30

## 2023-08-30 RX ORDER — METRONIDAZOLE 500 MG/100ML
500 INJECTION, SOLUTION INTRAVENOUS EVERY 8 HOURS
Status: DISCONTINUED | OUTPATIENT
Start: 2023-08-30 | End: 2023-09-02

## 2023-08-30 RX ORDER — LIDOCAINE HYDROCHLORIDE 10 MG/ML
INJECTION, SOLUTION EPIDURAL; INFILTRATION; INTRACAUDAL; PERINEURAL
Status: COMPLETED | OUTPATIENT
Start: 2023-08-30 | End: 2023-08-30

## 2023-08-30 RX ADMIN — SODIUM CHLORIDE: 9 INJECTION, SOLUTION INTRAVENOUS at 06:00

## 2023-08-30 RX ADMIN — INSULIN GLARGINE 20 UNITS: 100 INJECTION, SOLUTION SUBCUTANEOUS at 20:52

## 2023-08-30 RX ADMIN — HEPARIN SODIUM 5000 UNITS: 5000 INJECTION INTRAVENOUS; SUBCUTANEOUS at 15:37

## 2023-08-30 RX ADMIN — HEPARIN SODIUM 5000 UNITS: 5000 INJECTION INTRAVENOUS; SUBCUTANEOUS at 05:54

## 2023-08-30 RX ADMIN — ONDANSETRON 4 MG: 2 INJECTION INTRAMUSCULAR; INTRAVENOUS at 10:38

## 2023-08-30 RX ADMIN — PROPOFOL 25 MG: 10 INJECTION, EMULSION INTRAVENOUS at 14:13

## 2023-08-30 RX ADMIN — HEPARIN SODIUM 5000 UNITS: 5000 INJECTION INTRAVENOUS; SUBCUTANEOUS at 20:45

## 2023-08-30 RX ADMIN — CALCIUM ACETATE 667 MG: 667 CAPSULE ORAL at 09:11

## 2023-08-30 RX ADMIN — CALCIUM GLUCONATE 2000 MG: 20 INJECTION, SOLUTION INTRAVENOUS at 02:09

## 2023-08-30 RX ADMIN — ARIPIPRAZOLE 5 MG: 10 TABLET ORAL at 20:46

## 2023-08-30 RX ADMIN — METRONIDAZOLE 500 MG: 500 INJECTION, SOLUTION INTRAVENOUS at 14:11

## 2023-08-30 RX ADMIN — CALCITRIOL CAPSULES 0.25 MCG 0.5 MCG: 0.25 CAPSULE ORAL at 09:11

## 2023-08-30 RX ADMIN — ATORVASTATIN CALCIUM 40 MG: 40 TABLET, FILM COATED ORAL at 20:46

## 2023-08-30 RX ADMIN — HYDROMORPHONE HYDROCHLORIDE 0.5 MG: 1 INJECTION, SOLUTION INTRAMUSCULAR; INTRAVENOUS; SUBCUTANEOUS at 17:16

## 2023-08-30 RX ADMIN — OXYCODONE AND ACETAMINOPHEN 1 TABLET: 7.5; 325 TABLET ORAL at 15:36

## 2023-08-30 RX ADMIN — CARVEDILOL 25 MG: 25 TABLET, FILM COATED ORAL at 09:10

## 2023-08-30 RX ADMIN — FAMOTIDINE 10 MG: 20 TABLET ORAL at 09:11

## 2023-08-30 RX ADMIN — CALCIUM ACETATE 667 MG: 667 CAPSULE ORAL at 16:45

## 2023-08-30 RX ADMIN — PROPOFOL 25 MG: 10 INJECTION, EMULSION INTRAVENOUS at 14:21

## 2023-08-30 RX ADMIN — PROPOFOL 25 MG: 10 INJECTION, EMULSION INTRAVENOUS at 14:16

## 2023-08-30 RX ADMIN — OXYCODONE AND ACETAMINOPHEN 1 TABLET: 7.5; 325 TABLET ORAL at 09:10

## 2023-08-30 RX ADMIN — SODIUM CHLORIDE, PRESERVATIVE FREE 10 ML: 5 INJECTION INTRAVENOUS at 20:56

## 2023-08-30 RX ADMIN — SODIUM CHLORIDE: 900 INJECTION INTRAVENOUS at 14:05

## 2023-08-30 RX ADMIN — CARVEDILOL 25 MG: 25 TABLET, FILM COATED ORAL at 16:45

## 2023-08-30 RX ADMIN — LIDOCAINE HYDROCHLORIDE 100 MG: 20 INJECTION, SOLUTION INTRAVENOUS at 14:11

## 2023-08-30 RX ADMIN — SODIUM CHLORIDE: 900 INJECTION INTRAVENOUS at 14:08

## 2023-08-30 RX ADMIN — METRONIDAZOLE 500 MG: 500 INJECTION, SOLUTION INTRAVENOUS at 22:13

## 2023-08-30 RX ADMIN — CEFEPIME 1000 MG: 1 INJECTION, POWDER, FOR SOLUTION INTRAMUSCULAR; INTRAVENOUS at 15:52

## 2023-08-30 RX ADMIN — HYDROMORPHONE HYDROCHLORIDE 0.5 MG: 1 INJECTION, SOLUTION INTRAMUSCULAR; INTRAVENOUS; SUBCUTANEOUS at 20:46

## 2023-08-30 RX ADMIN — ACETAMINOPHEN 650 MG: 325 TABLET ORAL at 21:24

## 2023-08-30 RX ADMIN — HYDRALAZINE HYDROCHLORIDE 25 MG: 25 TABLET, FILM COATED ORAL at 15:36

## 2023-08-30 ASSESSMENT — PAIN DESCRIPTION - LOCATION
LOCATION: FOOT

## 2023-08-30 ASSESSMENT — PAIN SCALES - GENERAL
PAINLEVEL_OUTOF10: 1
PAINLEVEL_OUTOF10: 8
PAINLEVEL_OUTOF10: 8
PAINLEVEL_OUTOF10: 9
PAINLEVEL_OUTOF10: 8
PAINLEVEL_OUTOF10: 9
PAINLEVEL_OUTOF10: 3
PAINLEVEL_OUTOF10: 8
PAINLEVEL_OUTOF10: 3
PAINLEVEL_OUTOF10: 8
PAINLEVEL_OUTOF10: 9
PAINLEVEL_OUTOF10: 8

## 2023-08-30 ASSESSMENT — PAIN DESCRIPTION - ORIENTATION
ORIENTATION: LEFT
ORIENTATION: RIGHT
ORIENTATION: LEFT
ORIENTATION: RIGHT

## 2023-08-30 ASSESSMENT — PAIN SCALES - WONG BAKER
WONGBAKER_NUMERICALRESPONSE: 0
WONGBAKER_NUMERICALRESPONSE: 4
WONGBAKER_NUMERICALRESPONSE: 0
WONGBAKER_NUMERICALRESPONSE: 0

## 2023-08-30 ASSESSMENT — PAIN - FUNCTIONAL ASSESSMENT
PAIN_FUNCTIONAL_ASSESSMENT: ACTIVITIES ARE NOT PREVENTED

## 2023-08-30 ASSESSMENT — PAIN DESCRIPTION - ONSET: ONSET: ON-GOING

## 2023-08-30 ASSESSMENT — PAIN DESCRIPTION - DESCRIPTORS
DESCRIPTORS: ACHING
DESCRIPTORS: ACHING;BURNING
DESCRIPTORS: ACHING;CRUSHING
DESCRIPTORS: ACHING

## 2023-08-30 ASSESSMENT — PAIN DESCRIPTION - FREQUENCY: FREQUENCY: INTERMITTENT

## 2023-08-30 ASSESSMENT — LIFESTYLE VARIABLES: SMOKING_STATUS: 1

## 2023-08-30 ASSESSMENT — PAIN DESCRIPTION - PAIN TYPE: TYPE: CHRONIC PAIN

## 2023-08-30 NOTE — OP NOTE
[address], [address] [place] [address]                                OPERATIVE REPORT    PATIENT NAME: Rubin Pro                     :        1975  MED REC NO:   [id number]                          ROOM:       [id number]  ACCOUNT NO:   [de-identified]                           ADMIT DATE: 2023  PROVIDER:     Rc Swift MD    DATE OF PROCEDURE:  2023    PREOPERATIVE DIAGNOSIS:  Diabetic left foot infection. POSTOPERATIVE DIAGNOSES:  1. Diabetic foot ulcer involving skin and subcutaneous tissue, muscle,  and head of first metatarsal suggesting osteomyelitis. 2.  Plantar abscess. OPERATION PERFORMED:  Debridement of skin and subcutaneous tissue,  tendon, muscle, and bone of left great toe with drainage of plantar  abscess. OPERATING SURGEON:  Rc Swift MD    ANESTHESIA:  Monitored anesthesia care with 20 mL of 1% lidocaine with  absent Marcaine used locally. ESTIMATED BLOOD LOSS:  5 mL. DETAIL OF THE PROCEDURE:   The patient brought to the operating room  where IV sedation and preoperative antibiotics been administered. The  left foot was prepped and draped in a normal sterile fashion. I used  local anesthetic to anesthetize the wound around the ulcer and then used  a 15-blade to incise the skin and necrotic tissue, which was easily seen  within the ulcer bed. As I extended this incision proximally, I did  enter a plantar abscess along the tendon sheath and adequately drained  this. Some of this nonviable plantar fascia was excised using heavy  scissors. As I debrided down to the deep aspect of the wound, there was  exposed metatarsal bone which was identified. This was not healthy  appearing, so I used a bone rongeur to remove some of this bone and send  it for culture. Also, the pus from the plantar abscess was cultured  separately.   Once all nonviable tissue was removed, I checked

## 2023-08-30 NOTE — ANESTHESIA POSTPROCEDURE EVALUATION
Department of Anesthesiology  Postprocedure Note    Patient: Yesenia Momin  MRN: [id number]  YOB: 1975  Date of evaluation: 8/30/2023      Procedure Summary     Date: 08/30/23 Room / Location: [address] [place] [address]    Anesthesia Start: 1405 Anesthesia Stop: 5132    Procedure: FOOT DEBRIDEMENT INCISION AND DRAINAGE (Left: Foot) Diagnosis:       Infection      (Infection [B99.9])    Surgeons: Rei Ugalde MD Responsible Provider: Sabino Mark MD    Anesthesia Type: MAC ASA Status: 4          Anesthesia Type: No value filed.     Marcus Phase I:      Marcus Phase II:        Anesthesia Post Evaluation    Patient location during evaluation: bedside  Patient participation: complete - patient participated  Level of consciousness: awake and alert  Pain score: 0  Airway patency: patent  Nausea & Vomiting: no nausea and no vomiting  Complications: no  Cardiovascular status: blood pressure returned to baseline  Respiratory status: acceptable and room air  Hydration status: euvolemic  Pain management: adequate

## 2023-08-30 NOTE — CARE COORDINATION
This RN case manager to the bedside to speak with pt and initiate DC planning. Pt out of room at this time. CM will revisit.

## 2023-08-31 PROBLEM — M86.9 DIABETIC FOOT ULCER WITH OSTEOMYELITIS (HCC): Status: ACTIVE | Noted: 2022-09-01

## 2023-08-31 PROBLEM — E11.69 DIABETIC FOOT ULCER WITH OSTEOMYELITIS (HCC): Status: ACTIVE | Noted: 2022-09-01

## 2023-08-31 LAB
ANION GAP SERPL CALCULATED.3IONS-SCNC: 18 MMOL/L (ref 4–16)
BASOPHILS ABSOLUTE: 0 K/CU MM
BASOPHILS RELATIVE PERCENT: 0.3 % (ref 0–1)
BUN SERPL-MCNC: 47 MG/DL (ref 6–23)
CALCIUM SERPL-MCNC: 7.4 MG/DL (ref 8.3–10.6)
CHLORIDE BLD-SCNC: 94 MMOL/L (ref 99–110)
CO2: 22 MMOL/L (ref 21–32)
CREAT SERPL-MCNC: 7.2 MG/DL (ref 0.9–1.3)
CRP SERPL HS-MCNC: 249 MG/L
DIFFERENTIAL TYPE: ABNORMAL
DOSE AMOUNT: NORMAL
DOSE TIME: NORMAL
EKG ATRIAL RATE: 104 BPM
EKG DIAGNOSIS: NORMAL
EKG P AXIS: 8 DEGREES
EKG P-R INTERVAL: 168 MS
EKG Q-T INTERVAL: 346 MS
EKG QRS DURATION: 92 MS
EKG QTC CALCULATION (BAZETT): 454 MS
EKG R AXIS: 44 DEGREES
EKG T AXIS: 77 DEGREES
EKG VENTRICULAR RATE: 104 BPM
EOSINOPHILS ABSOLUTE: 0.1 K/CU MM
EOSINOPHILS RELATIVE PERCENT: 0.9 % (ref 0–3)
GFR SERPL CREATININE-BSD FRML MDRD: 9 ML/MIN/1.73M2
GLUCOSE BLD-MCNC: 141 MG/DL (ref 70–99)
GLUCOSE BLD-MCNC: 144 MG/DL (ref 70–99)
GLUCOSE BLD-MCNC: 147 MG/DL (ref 70–99)
GLUCOSE BLD-MCNC: 157 MG/DL (ref 70–99)
GLUCOSE BLD-MCNC: 166 MG/DL (ref 70–99)
GLUCOSE BLD-MCNC: 171 MG/DL (ref 70–99)
GLUCOSE BLD-MCNC: 185 MG/DL (ref 70–99)
GLUCOSE SERPL-MCNC: 172 MG/DL (ref 70–99)
HCT VFR BLD CALC: 23.3 % (ref 42–52)
HEMOGLOBIN: 7.1 GM/DL (ref 13.5–18)
IMMATURE NEUTROPHIL %: 0.6 % (ref 0–0.43)
LYMPHOCYTES ABSOLUTE: 0.6 K/CU MM
LYMPHOCYTES RELATIVE PERCENT: 4.9 % (ref 24–44)
MCH RBC QN AUTO: 29.5 PG (ref 27–31)
MCHC RBC AUTO-ENTMCNC: 30.5 % (ref 32–36)
MCV RBC AUTO: 96.7 FL (ref 78–100)
MONOCYTES ABSOLUTE: 0.7 K/CU MM
MONOCYTES RELATIVE PERCENT: 5.4 % (ref 0–4)
NUCLEATED RBC %: 0 %
PDW BLD-RTO: 17 % (ref 11.7–14.9)
PLATELET # BLD: 229 K/CU MM (ref 140–440)
PMV BLD AUTO: 9.7 FL (ref 7.5–11.1)
POTASSIUM SERPL-SCNC: 5.2 MMOL/L (ref 3.5–5.1)
PROCALCITONIN SERPL-MCNC: 1.83 NG/ML
RBC # BLD: 2.41 M/CU MM (ref 4.6–6.2)
SEGMENTED NEUTROPHILS ABSOLUTE COUNT: 11 K/CU MM
SEGMENTED NEUTROPHILS RELATIVE PERCENT: 87.9 % (ref 36–66)
SODIUM BLD-SCNC: 134 MMOL/L (ref 135–145)
TOTAL IMMATURE NEUTOROPHIL: 0.08 K/CU MM
TOTAL NUCLEATED RBC: 0 K/CU MM
VANCOMYCIN RANDOM: 12.5 UG/ML
WBC # BLD: 12.6 K/CU MM (ref 4–10.5)

## 2023-08-31 PROCEDURE — 2500000003 HC RX 250 WO HCPCS: Performed by: SURGERY

## 2023-08-31 PROCEDURE — 85025 COMPLETE CBC W/AUTO DIFF WBC: CPT

## 2023-08-31 PROCEDURE — 94761 N-INVAS EAR/PLS OXIMETRY MLT: CPT

## 2023-08-31 PROCEDURE — 80048 BASIC METABOLIC PNL TOTAL CA: CPT

## 2023-08-31 PROCEDURE — 84145 PROCALCITONIN (PCT): CPT

## 2023-08-31 PROCEDURE — 6360000002 HC RX W HCPCS: Performed by: SURGERY

## 2023-08-31 PROCEDURE — 1200000000 HC SEMI PRIVATE

## 2023-08-31 PROCEDURE — 82962 GLUCOSE BLOOD TEST: CPT

## 2023-08-31 PROCEDURE — 80202 ASSAY OF VANCOMYCIN: CPT

## 2023-08-31 PROCEDURE — 99232 SBSQ HOSP IP/OBS MODERATE 35: CPT | Performed by: NURSE PRACTITIONER

## 2023-08-31 PROCEDURE — 97161 PT EVAL LOW COMPLEX 20 MIN: CPT

## 2023-08-31 PROCEDURE — 6370000000 HC RX 637 (ALT 250 FOR IP): Performed by: SURGERY

## 2023-08-31 PROCEDURE — 2580000003 HC RX 258: Performed by: SURGERY

## 2023-08-31 PROCEDURE — 36415 COLL VENOUS BLD VENIPUNCTURE: CPT

## 2023-08-31 PROCEDURE — 86140 C-REACTIVE PROTEIN: CPT

## 2023-08-31 PROCEDURE — 93010 ELECTROCARDIOGRAM REPORT: CPT | Performed by: INTERNAL MEDICINE

## 2023-08-31 PROCEDURE — 6370000000 HC RX 637 (ALT 250 FOR IP): Performed by: FAMILY MEDICINE

## 2023-08-31 RX ORDER — ONDANSETRON 2 MG/ML
4 INJECTION INTRAMUSCULAR; INTRAVENOUS EVERY 6 HOURS PRN
Status: COMPLETED | OUTPATIENT
Start: 2023-08-31 | End: 2023-08-31

## 2023-08-31 RX ORDER — ONDANSETRON 4 MG/1
4 TABLET, ORALLY DISINTEGRATING ORAL EVERY 8 HOURS PRN
Status: COMPLETED | OUTPATIENT
Start: 2023-08-31 | End: 2023-08-31

## 2023-08-31 RX ADMIN — HEPARIN SODIUM 5000 UNITS: 5000 INJECTION INTRAVENOUS; SUBCUTANEOUS at 16:01

## 2023-08-31 RX ADMIN — ONDANSETRON 4 MG: 4 TABLET, ORALLY DISINTEGRATING ORAL at 10:15

## 2023-08-31 RX ADMIN — METRONIDAZOLE 500 MG: 500 INJECTION, SOLUTION INTRAVENOUS at 22:02

## 2023-08-31 RX ADMIN — METRONIDAZOLE 500 MG: 500 INJECTION, SOLUTION INTRAVENOUS at 05:40

## 2023-08-31 RX ADMIN — HYDROCODONE BITARTRATE AND ACETAMINOPHEN 1 TABLET: 7.5; 325 TABLET ORAL at 18:02

## 2023-08-31 RX ADMIN — CALCIUM ACETATE 667 MG: 667 CAPSULE ORAL at 08:34

## 2023-08-31 RX ADMIN — ARIPIPRAZOLE 5 MG: 10 TABLET ORAL at 21:56

## 2023-08-31 RX ADMIN — HYDROCODONE BITARTRATE AND ACETAMINOPHEN 1 TABLET: 7.5; 325 TABLET ORAL at 11:47

## 2023-08-31 RX ADMIN — INSULIN GLARGINE 20 UNITS: 100 INJECTION, SOLUTION SUBCUTANEOUS at 21:56

## 2023-08-31 RX ADMIN — CEFEPIME 1000 MG: 1 INJECTION, POWDER, FOR SOLUTION INTRAMUSCULAR; INTRAVENOUS at 17:09

## 2023-08-31 RX ADMIN — ONDANSETRON 4 MG: 4 TABLET, ORALLY DISINTEGRATING ORAL at 03:29

## 2023-08-31 RX ADMIN — CALCIUM ACETATE 667 MG: 667 CAPSULE ORAL at 16:10

## 2023-08-31 RX ADMIN — SODIUM CHLORIDE, PRESERVATIVE FREE 10 ML: 5 INJECTION INTRAVENOUS at 08:35

## 2023-08-31 RX ADMIN — METRONIDAZOLE 500 MG: 500 INJECTION, SOLUTION INTRAVENOUS at 15:56

## 2023-08-31 RX ADMIN — ATORVASTATIN CALCIUM 40 MG: 40 TABLET, FILM COATED ORAL at 21:56

## 2023-08-31 RX ADMIN — CALCITRIOL CAPSULES 0.25 MCG 0.5 MCG: 0.25 CAPSULE ORAL at 08:35

## 2023-08-31 RX ADMIN — FAMOTIDINE 10 MG: 20 TABLET ORAL at 08:35

## 2023-08-31 RX ADMIN — OXYCODONE AND ACETAMINOPHEN 1 TABLET: 7.5; 325 TABLET ORAL at 00:48

## 2023-08-31 RX ADMIN — HEPARIN SODIUM 5000 UNITS: 5000 INJECTION INTRAVENOUS; SUBCUTANEOUS at 21:56

## 2023-08-31 RX ADMIN — HYDROMORPHONE HYDROCHLORIDE 0.5 MG: 1 INJECTION, SOLUTION INTRAMUSCULAR; INTRAVENOUS; SUBCUTANEOUS at 21:56

## 2023-08-31 RX ADMIN — HYDROMORPHONE HYDROCHLORIDE 0.5 MG: 1 INJECTION, SOLUTION INTRAMUSCULAR; INTRAVENOUS; SUBCUTANEOUS at 10:14

## 2023-08-31 RX ADMIN — AMLODIPINE BESYLATE 5 MG: 5 TABLET ORAL at 08:36

## 2023-08-31 RX ADMIN — HEPARIN SODIUM 5000 UNITS: 5000 INJECTION INTRAVENOUS; SUBCUTANEOUS at 05:40

## 2023-08-31 RX ADMIN — HYDROMORPHONE HYDROCHLORIDE 0.5 MG: 1 INJECTION, SOLUTION INTRAMUSCULAR; INTRAVENOUS; SUBCUTANEOUS at 05:48

## 2023-08-31 RX ADMIN — CALCIUM ACETATE 667 MG: 667 CAPSULE ORAL at 11:47

## 2023-08-31 RX ADMIN — CARVEDILOL 25 MG: 25 TABLET, FILM COATED ORAL at 08:34

## 2023-08-31 ASSESSMENT — PAIN DESCRIPTION - LOCATION
LOCATION: LEG;FOOT
LOCATION: FOOT
LOCATION: FOOT;LEG
LOCATION: FOOT

## 2023-08-31 ASSESSMENT — PAIN SCALES - GENERAL
PAINLEVEL_OUTOF10: 2
PAINLEVEL_OUTOF10: 8
PAINLEVEL_OUTOF10: 2
PAINLEVEL_OUTOF10: 2
PAINLEVEL_OUTOF10: 8
PAINLEVEL_OUTOF10: 1
PAINLEVEL_OUTOF10: 9
PAINLEVEL_OUTOF10: 2
PAINLEVEL_OUTOF10: 6
PAINLEVEL_OUTOF10: 8
PAINLEVEL_OUTOF10: 6
PAINLEVEL_OUTOF10: 7
PAINLEVEL_OUTOF10: 7

## 2023-08-31 ASSESSMENT — PAIN DESCRIPTION - ORIENTATION
ORIENTATION: LEFT
ORIENTATION: RIGHT
ORIENTATION: LEFT
ORIENTATION: RIGHT

## 2023-08-31 ASSESSMENT — PAIN DESCRIPTION - DESCRIPTORS
DESCRIPTORS: ACHING;BURNING
DESCRIPTORS: ACHING;BURNING;DULL
DESCRIPTORS: ACHING
DESCRIPTORS: SHOOTING
DESCRIPTORS: ACHING

## 2023-08-31 ASSESSMENT — PAIN SCALES - WONG BAKER
WONGBAKER_NUMERICALRESPONSE: 2
WONGBAKER_NUMERICALRESPONSE: 4
WONGBAKER_NUMERICALRESPONSE: 4
WONGBAKER_NUMERICALRESPONSE: 0
WONGBAKER_NUMERICALRESPONSE: 4
WONGBAKER_NUMERICALRESPONSE: 0

## 2023-08-31 ASSESSMENT — PAIN - FUNCTIONAL ASSESSMENT
PAIN_FUNCTIONAL_ASSESSMENT: PREVENTS OR INTERFERES SOME ACTIVE ACTIVITIES AND ADLS
PAIN_FUNCTIONAL_ASSESSMENT: PREVENTS OR INTERFERES SOME ACTIVE ACTIVITIES AND ADLS
PAIN_FUNCTIONAL_ASSESSMENT: ACTIVITIES ARE NOT PREVENTED
PAIN_FUNCTIONAL_ASSESSMENT: PREVENTS OR INTERFERES SOME ACTIVE ACTIVITIES AND ADLS
PAIN_FUNCTIONAL_ASSESSMENT: PREVENTS OR INTERFERES WITH MANY ACTIVE NOT PASSIVE ACTIVITIES

## 2023-08-31 ASSESSMENT — PAIN DESCRIPTION - PAIN TYPE
TYPE: CHRONIC PAIN

## 2023-08-31 ASSESSMENT — PAIN DESCRIPTION - FREQUENCY
FREQUENCY: INTERMITTENT

## 2023-08-31 ASSESSMENT — PAIN DESCRIPTION - ONSET
ONSET: ON-GOING

## 2023-08-31 NOTE — CARE COORDINATION
08/31/23 1212   Service Assessment   Patient Orientation Alert and Oriented   Cognition Alert   History Provided By Medical Record; Patient   Primary [facility]   Patient's Healthcare Decision Maker is: Legal Next of Kin   PCP Verified by CM Yes   Prior Functional Level Independent in ADLs/IADLs   Current Functional Level Independent in ADLs/IADLs   Can patient return to prior living arrangement Yes   Ability to make needs known: Good   Family able to assist with home care needs: No   Would you like for me to discuss the discharge plan with any other family members/significant others, and if so, who? No   Financial Resources Lucio Voss None     Patient is from home , independent. Pt has walker at home. PCP and insurance. Patient independent during this admission. No other needs. CM following.

## 2023-09-01 LAB
ANION GAP SERPL CALCULATED.3IONS-SCNC: 22 MMOL/L (ref 4–16)
BASOPHILS ABSOLUTE: 0.1 K/CU MM
BASOPHILS RELATIVE PERCENT: 0.5 % (ref 0–1)
BUN SERPL-MCNC: 55 MG/DL (ref 6–23)
CALCIUM SERPL-MCNC: 7.1 MG/DL (ref 8.3–10.6)
CHLORIDE BLD-SCNC: 89 MMOL/L (ref 99–110)
CO2: 21 MMOL/L (ref 21–32)
CREAT SERPL-MCNC: 8.4 MG/DL (ref 0.9–1.3)
CRP SERPL HS-MCNC: 244.3 MG/L
DIFFERENTIAL TYPE: ABNORMAL
EOSINOPHILS ABSOLUTE: 0.2 K/CU MM
EOSINOPHILS RELATIVE PERCENT: 1.6 % (ref 0–3)
GFR SERPL CREATININE-BSD FRML MDRD: 7 ML/MIN/1.73M2
GLUCOSE BLD-MCNC: 117 MG/DL (ref 70–99)
GLUCOSE BLD-MCNC: 137 MG/DL (ref 70–99)
GLUCOSE BLD-MCNC: 182 MG/DL (ref 70–99)
GLUCOSE BLD-MCNC: 256 MG/DL (ref 70–99)
GLUCOSE SERPL-MCNC: 144 MG/DL (ref 70–99)
HCT VFR BLD CALC: 21.4 % (ref 42–52)
HCT VFR BLD CALC: 25.6 % (ref 42–52)
HEMOGLOBIN: 6.6 GM/DL (ref 13.5–18)
HEMOGLOBIN: 8.2 GM/DL (ref 13.5–18)
IMMATURE NEUTROPHIL %: 0.5 % (ref 0–0.43)
LYMPHOCYTES ABSOLUTE: 0.6 K/CU MM
LYMPHOCYTES RELATIVE PERCENT: 6.5 % (ref 24–44)
MCH RBC QN AUTO: 29.5 PG (ref 27–31)
MCHC RBC AUTO-ENTMCNC: 30.8 % (ref 32–36)
MCV RBC AUTO: 95.5 FL (ref 78–100)
MONOCYTES ABSOLUTE: 0.9 K/CU MM
MONOCYTES RELATIVE PERCENT: 9.2 % (ref 0–4)
NUCLEATED RBC %: 0 %
PDW BLD-RTO: 17 % (ref 11.7–14.9)
PLATELET # BLD: 213 K/CU MM (ref 140–440)
PMV BLD AUTO: 9.5 FL (ref 7.5–11.1)
POTASSIUM SERPL-SCNC: 4.7 MMOL/L (ref 3.5–5.1)
RBC # BLD: 2.24 M/CU MM (ref 4.6–6.2)
SEGMENTED NEUTROPHILS ABSOLUTE COUNT: 7.6 K/CU MM
SEGMENTED NEUTROPHILS RELATIVE PERCENT: 81.7 % (ref 36–66)
SODIUM BLD-SCNC: 132 MMOL/L (ref 135–145)
TOTAL IMMATURE NEUTOROPHIL: 0.05 K/CU MM
TOTAL NUCLEATED RBC: 0 K/CU MM
WBC # BLD: 9.3 K/CU MM (ref 4–10.5)

## 2023-09-01 PROCEDURE — 6360000002 HC RX W HCPCS: Performed by: SURGERY

## 2023-09-01 PROCEDURE — 85025 COMPLETE CBC W/AUTO DIFF WBC: CPT

## 2023-09-01 PROCEDURE — 90935 HEMODIALYSIS ONE EVALUATION: CPT

## 2023-09-01 PROCEDURE — 86922 COMPATIBILITY TEST ANTIGLOB: CPT

## 2023-09-01 PROCEDURE — 86901 BLOOD TYPING SEROLOGIC RH(D): CPT

## 2023-09-01 PROCEDURE — 86900 BLOOD TYPING SEROLOGIC ABO: CPT

## 2023-09-01 PROCEDURE — 2500000003 HC RX 250 WO HCPCS: Performed by: SURGERY

## 2023-09-01 PROCEDURE — 80048 BASIC METABOLIC PNL TOTAL CA: CPT

## 2023-09-01 PROCEDURE — 2580000003 HC RX 258: Performed by: SURGERY

## 2023-09-01 PROCEDURE — 6360000002 HC RX W HCPCS: Performed by: INTERNAL MEDICINE

## 2023-09-01 PROCEDURE — P9016 RBC LEUKOCYTES REDUCED: HCPCS

## 2023-09-01 PROCEDURE — 86850 RBC ANTIBODY SCREEN: CPT

## 2023-09-01 PROCEDURE — 2580000003 HC RX 258: Performed by: INTERNAL MEDICINE

## 2023-09-01 PROCEDURE — 30233N1 TRANSFUSION OF NONAUTOLOGOUS RED BLOOD CELLS INTO PERIPHERAL VEIN, PERCUTANEOUS APPROACH: ICD-10-PCS | Performed by: STUDENT IN AN ORGANIZED HEALTH CARE EDUCATION/TRAINING PROGRAM

## 2023-09-01 PROCEDURE — 85018 HEMOGLOBIN: CPT

## 2023-09-01 PROCEDURE — 82962 GLUCOSE BLOOD TEST: CPT

## 2023-09-01 PROCEDURE — 94761 N-INVAS EAR/PLS OXIMETRY MLT: CPT

## 2023-09-01 PROCEDURE — 1200000000 HC SEMI PRIVATE

## 2023-09-01 PROCEDURE — 6370000000 HC RX 637 (ALT 250 FOR IP): Performed by: SURGERY

## 2023-09-01 PROCEDURE — 36415 COLL VENOUS BLD VENIPUNCTURE: CPT

## 2023-09-01 PROCEDURE — 85014 HEMATOCRIT: CPT

## 2023-09-01 PROCEDURE — 99233 SBSQ HOSP IP/OBS HIGH 50: CPT | Performed by: NURSE PRACTITIONER

## 2023-09-01 PROCEDURE — 86140 C-REACTIVE PROTEIN: CPT

## 2023-09-01 RX ORDER — SODIUM CHLORIDE 9 MG/ML
INJECTION, SOLUTION INTRAVENOUS PRN
Status: DISCONTINUED | OUTPATIENT
Start: 2023-09-01 | End: 2023-09-10

## 2023-09-01 RX ADMIN — HEPARIN SODIUM 5000 UNITS: 5000 INJECTION INTRAVENOUS; SUBCUTANEOUS at 05:35

## 2023-09-01 RX ADMIN — EPOETIN ALFA-EPBX 10000 UNITS: 10000 INJECTION, SOLUTION INTRAVENOUS; SUBCUTANEOUS at 13:46

## 2023-09-01 RX ADMIN — SODIUM CHLORIDE: 9 INJECTION, SOLUTION INTRAVENOUS at 23:21

## 2023-09-01 RX ADMIN — METRONIDAZOLE 500 MG: 500 INJECTION, SOLUTION INTRAVENOUS at 23:19

## 2023-09-01 RX ADMIN — OXYCODONE AND ACETAMINOPHEN 1 TABLET: 7.5; 325 TABLET ORAL at 10:01

## 2023-09-01 RX ADMIN — OXYCODONE AND ACETAMINOPHEN 1 TABLET: 7.5; 325 TABLET ORAL at 21:12

## 2023-09-01 RX ADMIN — CALCIUM ACETATE 667 MG: 667 CAPSULE ORAL at 09:48

## 2023-09-01 RX ADMIN — CALCIUM ACETATE 667 MG: 667 CAPSULE ORAL at 17:29

## 2023-09-01 RX ADMIN — HYDRALAZINE HYDROCHLORIDE 25 MG: 25 TABLET, FILM COATED ORAL at 21:46

## 2023-09-01 RX ADMIN — VANCOMYCIN HYDROCHLORIDE 1500 MG: 1.5 INJECTION, POWDER, LYOPHILIZED, FOR SOLUTION INTRAVENOUS at 18:38

## 2023-09-01 RX ADMIN — FAMOTIDINE 10 MG: 20 TABLET ORAL at 09:48

## 2023-09-01 RX ADMIN — HEPARIN SODIUM 5000 UNITS: 5000 INJECTION INTRAVENOUS; SUBCUTANEOUS at 17:21

## 2023-09-01 RX ADMIN — CALCIUM ACETATE 667 MG: 667 CAPSULE ORAL at 12:07

## 2023-09-01 RX ADMIN — SODIUM CHLORIDE: 9 INJECTION, SOLUTION INTRAVENOUS at 23:18

## 2023-09-01 RX ADMIN — METRONIDAZOLE 500 MG: 500 INJECTION, SOLUTION INTRAVENOUS at 17:21

## 2023-09-01 RX ADMIN — ATORVASTATIN CALCIUM 40 MG: 40 TABLET, FILM COATED ORAL at 21:12

## 2023-09-01 RX ADMIN — ARIPIPRAZOLE 5 MG: 10 TABLET ORAL at 21:11

## 2023-09-01 RX ADMIN — CALCITRIOL CAPSULES 0.25 MCG 0.5 MCG: 0.25 CAPSULE ORAL at 09:48

## 2023-09-01 RX ADMIN — CEFEPIME 1000 MG: 1 INJECTION, POWDER, FOR SOLUTION INTRAMUSCULAR; INTRAVENOUS at 23:22

## 2023-09-01 RX ADMIN — SODIUM CHLORIDE, PRESERVATIVE FREE 10 ML: 5 INJECTION INTRAVENOUS at 09:48

## 2023-09-01 RX ADMIN — METRONIDAZOLE 500 MG: 500 INJECTION, SOLUTION INTRAVENOUS at 05:40

## 2023-09-01 RX ADMIN — INSULIN GLARGINE 20 UNITS: 100 INJECTION, SOLUTION SUBCUTANEOUS at 21:13

## 2023-09-01 RX ADMIN — CARVEDILOL 25 MG: 25 TABLET, FILM COATED ORAL at 17:29

## 2023-09-01 RX ADMIN — SODIUM CHLORIDE: 9 INJECTION, SOLUTION INTRAVENOUS at 17:20

## 2023-09-01 RX ADMIN — HYDROMORPHONE HYDROCHLORIDE 0.5 MG: 1 INJECTION, SOLUTION INTRAMUSCULAR; INTRAVENOUS; SUBCUTANEOUS at 17:30

## 2023-09-01 ASSESSMENT — PAIN DESCRIPTION - DESCRIPTORS
DESCRIPTORS: ACHING
DESCRIPTORS: SHARP
DESCRIPTORS: SHOOTING;SHARP
DESCRIPTORS: SHARP;SHOOTING

## 2023-09-01 ASSESSMENT — PAIN DESCRIPTION - ORIENTATION
ORIENTATION: LEFT

## 2023-09-01 ASSESSMENT — PAIN DESCRIPTION - LOCATION
LOCATION: FOOT
LOCATION: FOOT;LEG

## 2023-09-01 ASSESSMENT — PAIN DESCRIPTION - FREQUENCY: FREQUENCY: CONTINUOUS

## 2023-09-01 ASSESSMENT — PAIN SCALES - GENERAL
PAINLEVEL_OUTOF10: 7
PAINLEVEL_OUTOF10: 7

## 2023-09-01 ASSESSMENT — PAIN DESCRIPTION - PAIN TYPE: TYPE: CHRONIC PAIN

## 2023-09-01 ASSESSMENT — PAIN - FUNCTIONAL ASSESSMENT: PAIN_FUNCTIONAL_ASSESSMENT: ACTIVITIES ARE NOT PREVENTED

## 2023-09-01 NOTE — CONSENT
Informed Consent for Blood Component Transfusion Note    I have discussed with the patient the rationale for blood component transfusion; its benefits in treating or preventing fatigue, organ damage; and its risk which includes mild transfusion reactions, rare risk of blood borne infection, or more serious but rare reactions. I have discussed the alternatives to transfusion, including the risk and consequences of not receiving transfusion. The patient had an opportunity to ask questions and had agreed to proceed with transfusion of blood components.     Electronically signed by Ludivina Davila MD on 9/1/23 at 8:13 AM EDT

## 2023-09-02 LAB
ABO/RH: NORMAL
ANION GAP SERPL CALCULATED.3IONS-SCNC: 16 MMOL/L (ref 4–16)
ANTIBODY SCREEN: NEGATIVE
BUN SERPL-MCNC: 41 MG/DL (ref 6–23)
CALCIUM SERPL-MCNC: 7.1 MG/DL (ref 8.3–10.6)
CHLORIDE BLD-SCNC: 94 MMOL/L (ref 99–110)
CO2: 24 MMOL/L (ref 21–32)
COMMENT: NORMAL
COMPONENT: NORMAL
CREAT SERPL-MCNC: 6.9 MG/DL (ref 0.9–1.3)
CROSSMATCH RESULT: NORMAL
CRP SERPL HS-MCNC: 157.8 MG/L
CULTURE: ABNORMAL
GFR SERPL CREATININE-BSD FRML MDRD: 9 ML/MIN/1.73M2
GLUCOSE BLD-MCNC: 157 MG/DL (ref 70–99)
GLUCOSE BLD-MCNC: 171 MG/DL (ref 70–99)
GLUCOSE BLD-MCNC: 189 MG/DL (ref 70–99)
GLUCOSE BLD-MCNC: 209 MG/DL (ref 70–99)
GLUCOSE SERPL-MCNC: 195 MG/DL (ref 70–99)
HCT VFR BLD CALC: 25.3 % (ref 42–52)
HEMOGLOBIN: 7.8 GM/DL (ref 13.5–18)
Lab: ABNORMAL
MCH RBC QN AUTO: 29.4 PG (ref 27–31)
MCHC RBC AUTO-ENTMCNC: 30.8 % (ref 32–36)
MCV RBC AUTO: 95.5 FL (ref 78–100)
PDW BLD-RTO: 17 % (ref 11.7–14.9)
PLATELET # BLD: 240 K/CU MM (ref 140–440)
PMV BLD AUTO: 9.6 FL (ref 7.5–11.1)
POTASSIUM SERPL-SCNC: 4.6 MMOL/L (ref 3.5–5.1)
RBC # BLD: 2.65 M/CU MM (ref 4.6–6.2)
SODIUM BLD-SCNC: 134 MMOL/L (ref 135–145)
SPECIMEN: ABNORMAL
STATUS: NORMAL
TRANSFUSION STATUS: NORMAL
UNIT DIVISION: 0
UNIT NUMBER: NORMAL
WBC # BLD: 11.5 K/CU MM (ref 4–10.5)

## 2023-09-02 PROCEDURE — C9113 INJ PANTOPRAZOLE SODIUM, VIA: HCPCS | Performed by: STUDENT IN AN ORGANIZED HEALTH CARE EDUCATION/TRAINING PROGRAM

## 2023-09-02 PROCEDURE — 6360000002 HC RX W HCPCS: Performed by: STUDENT IN AN ORGANIZED HEALTH CARE EDUCATION/TRAINING PROGRAM

## 2023-09-02 PROCEDURE — 80048 BASIC METABOLIC PNL TOTAL CA: CPT

## 2023-09-02 PROCEDURE — 94761 N-INVAS EAR/PLS OXIMETRY MLT: CPT

## 2023-09-02 PROCEDURE — 86140 C-REACTIVE PROTEIN: CPT

## 2023-09-02 PROCEDURE — 82962 GLUCOSE BLOOD TEST: CPT

## 2023-09-02 PROCEDURE — 2580000003 HC RX 258: Performed by: STUDENT IN AN ORGANIZED HEALTH CARE EDUCATION/TRAINING PROGRAM

## 2023-09-02 PROCEDURE — 6370000000 HC RX 637 (ALT 250 FOR IP): Performed by: SURGERY

## 2023-09-02 PROCEDURE — 85027 COMPLETE CBC AUTOMATED: CPT

## 2023-09-02 PROCEDURE — 2500000003 HC RX 250 WO HCPCS: Performed by: SURGERY

## 2023-09-02 PROCEDURE — 6360000002 HC RX W HCPCS: Performed by: SURGERY

## 2023-09-02 PROCEDURE — 36415 COLL VENOUS BLD VENIPUNCTURE: CPT

## 2023-09-02 PROCEDURE — 6370000000 HC RX 637 (ALT 250 FOR IP): Performed by: STUDENT IN AN ORGANIZED HEALTH CARE EDUCATION/TRAINING PROGRAM

## 2023-09-02 PROCEDURE — 1200000000 HC SEMI PRIVATE

## 2023-09-02 PROCEDURE — 2580000003 HC RX 258: Performed by: SURGERY

## 2023-09-02 RX ORDER — PANTOPRAZOLE SODIUM 40 MG/10ML
40 INJECTION, POWDER, LYOPHILIZED, FOR SOLUTION INTRAVENOUS 2 TIMES DAILY
Status: DISCONTINUED | OUTPATIENT
Start: 2023-09-02 | End: 2023-09-04

## 2023-09-02 RX ORDER — OXYCODONE HYDROCHLORIDE AND ACETAMINOPHEN 5; 325 MG/1; MG/1
1 TABLET ORAL EVERY 6 HOURS PRN
Status: DISCONTINUED | OUTPATIENT
Start: 2023-09-02 | End: 2023-09-06

## 2023-09-02 RX ORDER — INSULIN LISPRO 100 [IU]/ML
3 INJECTION, SOLUTION INTRAVENOUS; SUBCUTANEOUS
Status: DISCONTINUED | OUTPATIENT
Start: 2023-09-02 | End: 2023-09-12 | Stop reason: HOSPADM

## 2023-09-02 RX ADMIN — PANTOPRAZOLE SODIUM 40 MG: 40 INJECTION, POWDER, FOR SOLUTION INTRAVENOUS at 14:45

## 2023-09-02 RX ADMIN — CALCIUM ACETATE 667 MG: 667 CAPSULE ORAL at 16:53

## 2023-09-02 RX ADMIN — OXYCODONE AND ACETAMINOPHEN 1 TABLET: 7.5; 325 TABLET ORAL at 14:46

## 2023-09-02 RX ADMIN — CARVEDILOL 25 MG: 25 TABLET, FILM COATED ORAL at 08:41

## 2023-09-02 RX ADMIN — PIPERACILLIN AND TAZOBACTAM 3375 MG: 3; .375 INJECTION, POWDER, LYOPHILIZED, FOR SOLUTION INTRAVENOUS at 16:57

## 2023-09-02 RX ADMIN — AMLODIPINE BESYLATE 5 MG: 5 TABLET ORAL at 08:41

## 2023-09-02 RX ADMIN — SODIUM CHLORIDE, PRESERVATIVE FREE 10 ML: 5 INJECTION INTRAVENOUS at 21:37

## 2023-09-02 RX ADMIN — INSULIN LISPRO 2 UNITS: 100 INJECTION, SOLUTION INTRAVENOUS; SUBCUTANEOUS at 12:25

## 2023-09-02 RX ADMIN — CARVEDILOL 25 MG: 25 TABLET, FILM COATED ORAL at 16:52

## 2023-09-02 RX ADMIN — ARIPIPRAZOLE 5 MG: 10 TABLET ORAL at 20:49

## 2023-09-02 RX ADMIN — OXYCODONE AND ACETAMINOPHEN 1 TABLET: 7.5; 325 TABLET ORAL at 08:47

## 2023-09-02 RX ADMIN — HYDRALAZINE HYDROCHLORIDE 25 MG: 25 TABLET, FILM COATED ORAL at 20:50

## 2023-09-02 RX ADMIN — CALCITRIOL CAPSULES 0.25 MCG 0.5 MCG: 0.25 CAPSULE ORAL at 08:41

## 2023-09-02 RX ADMIN — INSULIN LISPRO 3 UNITS: 100 INJECTION, SOLUTION INTRAVENOUS; SUBCUTANEOUS at 16:53

## 2023-09-02 RX ADMIN — HYDRALAZINE HYDROCHLORIDE 25 MG: 25 TABLET, FILM COATED ORAL at 14:25

## 2023-09-02 RX ADMIN — INSULIN GLARGINE 20 UNITS: 100 INJECTION, SOLUTION SUBCUTANEOUS at 20:49

## 2023-09-02 RX ADMIN — ATORVASTATIN CALCIUM 40 MG: 40 TABLET, FILM COATED ORAL at 20:49

## 2023-09-02 RX ADMIN — HYDRALAZINE HYDROCHLORIDE 25 MG: 25 TABLET, FILM COATED ORAL at 06:19

## 2023-09-02 RX ADMIN — SODIUM CHLORIDE, PRESERVATIVE FREE 10 ML: 5 INJECTION INTRAVENOUS at 03:53

## 2023-09-02 RX ADMIN — OXYCODONE AND ACETAMINOPHEN 1 TABLET: 7.5; 325 TABLET ORAL at 20:52

## 2023-09-02 RX ADMIN — METRONIDAZOLE 500 MG: 500 INJECTION, SOLUTION INTRAVENOUS at 09:10

## 2023-09-02 RX ADMIN — CALCIUM ACETATE 667 MG: 667 CAPSULE ORAL at 08:41

## 2023-09-02 RX ADMIN — PANTOPRAZOLE SODIUM 40 MG: 40 INJECTION, POWDER, FOR SOLUTION INTRAVENOUS at 21:37

## 2023-09-02 RX ADMIN — CALCIUM ACETATE 667 MG: 667 CAPSULE ORAL at 12:24

## 2023-09-02 RX ADMIN — FAMOTIDINE 10 MG: 20 TABLET ORAL at 08:41

## 2023-09-02 RX ADMIN — HEPARIN SODIUM 5000 UNITS: 5000 INJECTION INTRAVENOUS; SUBCUTANEOUS at 00:25

## 2023-09-02 ASSESSMENT — PAIN DESCRIPTION - LOCATION
LOCATION: FOOT

## 2023-09-02 ASSESSMENT — PAIN SCALES - GENERAL
PAINLEVEL_OUTOF10: 8
PAINLEVEL_OUTOF10: 2
PAINLEVEL_OUTOF10: 7
PAINLEVEL_OUTOF10: 8
PAINLEVEL_OUTOF10: 8
PAINLEVEL_OUTOF10: 7

## 2023-09-02 ASSESSMENT — PAIN - FUNCTIONAL ASSESSMENT
PAIN_FUNCTIONAL_ASSESSMENT: ACTIVITIES ARE NOT PREVENTED
PAIN_FUNCTIONAL_ASSESSMENT: PREVENTS OR INTERFERES SOME ACTIVE ACTIVITIES AND ADLS
PAIN_FUNCTIONAL_ASSESSMENT: ACTIVITIES ARE NOT PREVENTED
PAIN_FUNCTIONAL_ASSESSMENT: PREVENTS OR INTERFERES SOME ACTIVE ACTIVITIES AND ADLS

## 2023-09-02 ASSESSMENT — PAIN SCALES - WONG BAKER
WONGBAKER_NUMERICALRESPONSE: 2
WONGBAKER_NUMERICALRESPONSE: 0

## 2023-09-02 ASSESSMENT — PAIN DESCRIPTION - DESCRIPTORS
DESCRIPTORS: SHARP;SHOOTING
DESCRIPTORS: SHARP;STABBING;THROBBING
DESCRIPTORS: SHARP;SHOOTING;STABBING
DESCRIPTORS: SHARP;SHOOTING;STABBING

## 2023-09-02 ASSESSMENT — PAIN DESCRIPTION - ONSET: ONSET: ON-GOING

## 2023-09-02 ASSESSMENT — PAIN DESCRIPTION - ORIENTATION
ORIENTATION: LEFT
ORIENTATION: OTHER (COMMENT)
ORIENTATION: LEFT
ORIENTATION: LEFT

## 2023-09-02 ASSESSMENT — PAIN DESCRIPTION - PAIN TYPE: TYPE: CHRONIC PAIN

## 2023-09-02 ASSESSMENT — PAIN DESCRIPTION - FREQUENCY: FREQUENCY: CONTINUOUS

## 2023-09-02 NOTE — CARE COORDINATION
This RN case manager attempted to speak with patient regarding [facility] choice. Patient in HD at this time. CM will revisit.

## 2023-09-03 LAB
ANION GAP SERPL CALCULATED.3IONS-SCNC: 16 MMOL/L (ref 4–16)
BUN SERPL-MCNC: 51 MG/DL (ref 6–23)
CALCIUM SERPL-MCNC: 7.5 MG/DL (ref 8.3–10.6)
CHLORIDE BLD-SCNC: 94 MMOL/L (ref 99–110)
CO2: 23 MMOL/L (ref 21–32)
CREAT SERPL-MCNC: 8.1 MG/DL (ref 0.9–1.3)
CULTURE: NORMAL
CULTURE: NORMAL
GFR SERPL CREATININE-BSD FRML MDRD: 8 ML/MIN/1.73M2
GLUCOSE BLD-MCNC: 122 MG/DL (ref 70–99)
GLUCOSE BLD-MCNC: 130 MG/DL (ref 70–99)
GLUCOSE BLD-MCNC: 182 MG/DL (ref 70–99)
GLUCOSE BLD-MCNC: 187 MG/DL (ref 70–99)
GLUCOSE SERPL-MCNC: 162 MG/DL (ref 70–99)
HCT VFR BLD CALC: 26 % (ref 42–52)
HEMOGLOBIN: 8 GM/DL (ref 13.5–18)
Lab: NORMAL
Lab: NORMAL
MCH RBC QN AUTO: 29.2 PG (ref 27–31)
MCHC RBC AUTO-ENTMCNC: 30.8 % (ref 32–36)
MCV RBC AUTO: 94.9 FL (ref 78–100)
PDW BLD-RTO: 16.7 % (ref 11.7–14.9)
PLATELET # BLD: 263 K/CU MM (ref 140–440)
PMV BLD AUTO: 9.1 FL (ref 7.5–11.1)
POTASSIUM SERPL-SCNC: 4.7 MMOL/L (ref 3.5–5.1)
RBC # BLD: 2.74 M/CU MM (ref 4.6–6.2)
SODIUM BLD-SCNC: 133 MMOL/L (ref 135–145)
SPECIMEN: NORMAL
SPECIMEN: NORMAL
WBC # BLD: 12.2 K/CU MM (ref 4–10.5)

## 2023-09-03 PROCEDURE — C9113 INJ PANTOPRAZOLE SODIUM, VIA: HCPCS | Performed by: STUDENT IN AN ORGANIZED HEALTH CARE EDUCATION/TRAINING PROGRAM

## 2023-09-03 PROCEDURE — 6360000002 HC RX W HCPCS: Performed by: STUDENT IN AN ORGANIZED HEALTH CARE EDUCATION/TRAINING PROGRAM

## 2023-09-03 PROCEDURE — 94761 N-INVAS EAR/PLS OXIMETRY MLT: CPT

## 2023-09-03 PROCEDURE — 80048 BASIC METABOLIC PNL TOTAL CA: CPT

## 2023-09-03 PROCEDURE — 2580000003 HC RX 258: Performed by: STUDENT IN AN ORGANIZED HEALTH CARE EDUCATION/TRAINING PROGRAM

## 2023-09-03 PROCEDURE — 36415 COLL VENOUS BLD VENIPUNCTURE: CPT

## 2023-09-03 PROCEDURE — 1200000000 HC SEMI PRIVATE

## 2023-09-03 PROCEDURE — 2500000003 HC RX 250 WO HCPCS: Performed by: SURGERY

## 2023-09-03 PROCEDURE — 6370000000 HC RX 637 (ALT 250 FOR IP): Performed by: STUDENT IN AN ORGANIZED HEALTH CARE EDUCATION/TRAINING PROGRAM

## 2023-09-03 PROCEDURE — 6370000000 HC RX 637 (ALT 250 FOR IP): Performed by: SURGERY

## 2023-09-03 PROCEDURE — 2580000003 HC RX 258: Performed by: SURGERY

## 2023-09-03 PROCEDURE — 82962 GLUCOSE BLOOD TEST: CPT

## 2023-09-03 PROCEDURE — 85027 COMPLETE CBC AUTOMATED: CPT

## 2023-09-03 RX ORDER — INSULIN GLARGINE 100 [IU]/ML
10 INJECTION, SOLUTION SUBCUTANEOUS NIGHTLY
Status: DISCONTINUED | OUTPATIENT
Start: 2023-09-03 | End: 2023-09-12 | Stop reason: HOSPADM

## 2023-09-03 RX ORDER — POLYETHYLENE GLYCOL 3350 17 G/17G
17 POWDER, FOR SOLUTION ORAL 3 TIMES DAILY PRN
Status: DISCONTINUED | OUTPATIENT
Start: 2023-09-03 | End: 2023-09-12 | Stop reason: HOSPADM

## 2023-09-03 RX ADMIN — SODIUM CHLORIDE, PRESERVATIVE FREE 10 ML: 5 INJECTION INTRAVENOUS at 21:03

## 2023-09-03 RX ADMIN — OXYCODONE AND ACETAMINOPHEN 1 TABLET: 7.5; 325 TABLET ORAL at 08:21

## 2023-09-03 RX ADMIN — CALCITRIOL CAPSULES 0.25 MCG 0.5 MCG: 0.25 CAPSULE ORAL at 08:17

## 2023-09-03 RX ADMIN — CARVEDILOL 25 MG: 25 TABLET, FILM COATED ORAL at 08:17

## 2023-09-03 RX ADMIN — INSULIN LISPRO 3 UNITS: 100 INJECTION, SOLUTION INTRAVENOUS; SUBCUTANEOUS at 12:08

## 2023-09-03 RX ADMIN — INSULIN GLARGINE 10 UNITS: 100 INJECTION, SOLUTION SUBCUTANEOUS at 21:02

## 2023-09-03 RX ADMIN — CALCIUM ACETATE 667 MG: 667 CAPSULE ORAL at 16:47

## 2023-09-03 RX ADMIN — PIPERACILLIN AND TAZOBACTAM 3375 MG: 3; .375 INJECTION, POWDER, LYOPHILIZED, FOR SOLUTION INTRAVENOUS at 04:35

## 2023-09-03 RX ADMIN — CARVEDILOL 25 MG: 25 TABLET, FILM COATED ORAL at 16:47

## 2023-09-03 RX ADMIN — ATORVASTATIN CALCIUM 40 MG: 40 TABLET, FILM COATED ORAL at 21:03

## 2023-09-03 RX ADMIN — OXYCODONE AND ACETAMINOPHEN 1 TABLET: 7.5; 325 TABLET ORAL at 21:56

## 2023-09-03 RX ADMIN — HYDRALAZINE HYDROCHLORIDE 25 MG: 25 TABLET, FILM COATED ORAL at 13:46

## 2023-09-03 RX ADMIN — HYDRALAZINE HYDROCHLORIDE 25 MG: 25 TABLET, FILM COATED ORAL at 21:03

## 2023-09-03 RX ADMIN — SODIUM CHLORIDE, PRESERVATIVE FREE 10 ML: 5 INJECTION INTRAVENOUS at 08:17

## 2023-09-03 RX ADMIN — ARIPIPRAZOLE 5 MG: 10 TABLET ORAL at 21:04

## 2023-09-03 RX ADMIN — AMLODIPINE BESYLATE 5 MG: 5 TABLET ORAL at 08:17

## 2023-09-03 RX ADMIN — INSULIN LISPRO 3 UNITS: 100 INJECTION, SOLUTION INTRAVENOUS; SUBCUTANEOUS at 08:16

## 2023-09-03 RX ADMIN — CALCIUM ACETATE 667 MG: 667 CAPSULE ORAL at 12:08

## 2023-09-03 RX ADMIN — INSULIN LISPRO 3 UNITS: 100 INJECTION, SOLUTION INTRAVENOUS; SUBCUTANEOUS at 16:47

## 2023-09-03 RX ADMIN — PIPERACILLIN AND TAZOBACTAM 3375 MG: 3; .375 INJECTION, POWDER, LYOPHILIZED, FOR SOLUTION INTRAVENOUS at 16:55

## 2023-09-03 RX ADMIN — OXYCODONE AND ACETAMINOPHEN 1 TABLET: 7.5; 325 TABLET ORAL at 16:47

## 2023-09-03 RX ADMIN — CALCIUM ACETATE 667 MG: 667 CAPSULE ORAL at 08:17

## 2023-09-03 RX ADMIN — PANTOPRAZOLE SODIUM 40 MG: 40 INJECTION, POWDER, FOR SOLUTION INTRAVENOUS at 21:39

## 2023-09-03 RX ADMIN — PANTOPRAZOLE SODIUM 40 MG: 40 INJECTION, POWDER, FOR SOLUTION INTRAVENOUS at 08:17

## 2023-09-03 ASSESSMENT — PAIN SCALES - GENERAL
PAINLEVEL_OUTOF10: 8
PAINLEVEL_OUTOF10: 7
PAINLEVEL_OUTOF10: 0
PAINLEVEL_OUTOF10: 2

## 2023-09-03 ASSESSMENT — PAIN DESCRIPTION - DESCRIPTORS
DESCRIPTORS: ACHING;SHARP;THROBBING
DESCRIPTORS: ACHING;SHARP;THROBBING

## 2023-09-03 ASSESSMENT — PAIN DESCRIPTION - LOCATION
LOCATION: FOOT
LOCATION: FOOT

## 2023-09-03 ASSESSMENT — PAIN DESCRIPTION - ORIENTATION
ORIENTATION: LEFT
ORIENTATION: LEFT

## 2023-09-03 ASSESSMENT — PAIN SCALES - WONG BAKER
WONGBAKER_NUMERICALRESPONSE: 0
WONGBAKER_NUMERICALRESPONSE: 2

## 2023-09-03 NOTE — CARE COORDINATION
CM reviewed the previous 's note stating patient will need [facility]  [address] [facility] services for IV antibiotic therapy and [address] [address] [address] [facility] agency choice is needed. CM spoke with the patient on the room telephone regarding his [address] choice. Patient stated that he had [address] prior to admission for wound dressings but is unable to remember the name of the [facility] agency. CM reviewed previous case management notes that indicate the patient had a referral to [facility] during previous admission in August 2023. CM will follow.

## 2023-09-04 ENCOUNTER — ANESTHESIA EVENT (OUTPATIENT)
Dept: ENDOSCOPY | Age: 48
End: 2023-09-04
Payer: MEDICARE

## 2023-09-04 ENCOUNTER — ANESTHESIA (OUTPATIENT)
Dept: ENDOSCOPY | Age: 48
End: 2023-09-04
Payer: MEDICARE

## 2023-09-04 LAB
ANION GAP SERPL CALCULATED.3IONS-SCNC: 17 MMOL/L (ref 4–16)
BUN SERPL-MCNC: 59 MG/DL (ref 6–23)
CALCIUM SERPL-MCNC: 7.6 MG/DL (ref 8.3–10.6)
CHLORIDE BLD-SCNC: 96 MMOL/L (ref 99–110)
CO2: 21 MMOL/L (ref 21–32)
CREAT SERPL-MCNC: 9.8 MG/DL (ref 0.9–1.3)
CULTURE: ABNORMAL
GFR SERPL CREATININE-BSD FRML MDRD: 6 ML/MIN/1.73M2
GLUCOSE BLD-MCNC: 114 MG/DL (ref 70–99)
GLUCOSE BLD-MCNC: 145 MG/DL (ref 70–99)
GLUCOSE BLD-MCNC: 157 MG/DL (ref 70–99)
GLUCOSE BLD-MCNC: 200 MG/DL (ref 70–99)
GLUCOSE SERPL-MCNC: 130 MG/DL (ref 70–99)
Lab: ABNORMAL
Lab: ABNORMAL
POTASSIUM SERPL-SCNC: 4.7 MMOL/L (ref 3.5–5.1)
SODIUM BLD-SCNC: 134 MMOL/L (ref 135–145)
SPECIMEN: ABNORMAL
SPECIMEN: ABNORMAL

## 2023-09-04 PROCEDURE — 2580000003 HC RX 258: Performed by: STUDENT IN AN ORGANIZED HEALTH CARE EDUCATION/TRAINING PROGRAM

## 2023-09-04 PROCEDURE — 3700000001 HC ADD 15 MINUTES (ANESTHESIA): Performed by: INTERNAL MEDICINE

## 2023-09-04 PROCEDURE — 1200000000 HC SEMI PRIVATE

## 2023-09-04 PROCEDURE — 36415 COLL VENOUS BLD VENIPUNCTURE: CPT

## 2023-09-04 PROCEDURE — 0DB68ZX EXCISION OF STOMACH, VIA NATURAL OR ARTIFICIAL OPENING ENDOSCOPIC, DIAGNOSTIC: ICD-10-PCS | Performed by: INTERNAL MEDICINE

## 2023-09-04 PROCEDURE — 88342 IMHCHEM/IMCYTCHM 1ST ANTB: CPT

## 2023-09-04 PROCEDURE — 2500000003 HC RX 250 WO HCPCS

## 2023-09-04 PROCEDURE — 6370000000 HC RX 637 (ALT 250 FOR IP): Performed by: SURGERY

## 2023-09-04 PROCEDURE — 3609012400 HC EGD TRANSORAL BIOPSY SINGLE/MULTIPLE: Performed by: INTERNAL MEDICINE

## 2023-09-04 PROCEDURE — 94761 N-INVAS EAR/PLS OXIMETRY MLT: CPT

## 2023-09-04 PROCEDURE — 82962 GLUCOSE BLOOD TEST: CPT

## 2023-09-04 PROCEDURE — 2709999900 HC NON-CHARGEABLE SUPPLY: Performed by: INTERNAL MEDICINE

## 2023-09-04 PROCEDURE — 6360000002 HC RX W HCPCS: Performed by: INTERNAL MEDICINE

## 2023-09-04 PROCEDURE — 88305 TISSUE EXAM BY PATHOLOGIST: CPT

## 2023-09-04 PROCEDURE — 2500000003 HC RX 250 WO HCPCS: Performed by: SURGERY

## 2023-09-04 PROCEDURE — 2580000003 HC RX 258: Performed by: SURGERY

## 2023-09-04 PROCEDURE — 6360000002 HC RX W HCPCS

## 2023-09-04 PROCEDURE — 6370000000 HC RX 637 (ALT 250 FOR IP): Performed by: STUDENT IN AN ORGANIZED HEALTH CARE EDUCATION/TRAINING PROGRAM

## 2023-09-04 PROCEDURE — 3700000000 HC ANESTHESIA ATTENDED CARE: Performed by: INTERNAL MEDICINE

## 2023-09-04 PROCEDURE — 90935 HEMODIALYSIS ONE EVALUATION: CPT

## 2023-09-04 PROCEDURE — 6360000002 HC RX W HCPCS: Performed by: STUDENT IN AN ORGANIZED HEALTH CARE EDUCATION/TRAINING PROGRAM

## 2023-09-04 PROCEDURE — 80048 BASIC METABOLIC PNL TOTAL CA: CPT

## 2023-09-04 RX ORDER — LIDOCAINE HYDROCHLORIDE 20 MG/ML
INJECTION, SOLUTION INFILTRATION; PERINEURAL PRN
Status: DISCONTINUED | OUTPATIENT
Start: 2023-09-04 | End: 2023-09-04 | Stop reason: SDUPTHER

## 2023-09-04 RX ORDER — ONDANSETRON 2 MG/ML
4 INJECTION INTRAMUSCULAR; INTRAVENOUS EVERY 6 HOURS PRN
Status: DISCONTINUED | OUTPATIENT
Start: 2023-09-04 | End: 2023-09-12 | Stop reason: HOSPADM

## 2023-09-04 RX ORDER — PANTOPRAZOLE SODIUM 40 MG/1
40 TABLET, DELAYED RELEASE ORAL
Status: DISCONTINUED | OUTPATIENT
Start: 2023-09-04 | End: 2023-09-12 | Stop reason: HOSPADM

## 2023-09-04 RX ORDER — ONDANSETRON 2 MG/ML
4 INJECTION INTRAMUSCULAR; INTRAVENOUS
Status: DISPENSED | OUTPATIENT
Start: 2023-09-04 | End: 2023-09-04

## 2023-09-04 RX ORDER — PROPOFOL 10 MG/ML
INJECTION, EMULSION INTRAVENOUS PRN
Status: DISCONTINUED | OUTPATIENT
Start: 2023-09-04 | End: 2023-09-04 | Stop reason: SDUPTHER

## 2023-09-04 RX ADMIN — CALCITRIOL CAPSULES 0.25 MCG 0.5 MCG: 0.25 CAPSULE ORAL at 13:44

## 2023-09-04 RX ADMIN — LIDOCAINE HYDROCHLORIDE 100 MG: 20 INJECTION, SOLUTION INFILTRATION; PERINEURAL at 07:45

## 2023-09-04 RX ADMIN — PANTOPRAZOLE SODIUM 40 MG: 40 TABLET, DELAYED RELEASE ORAL at 16:40

## 2023-09-04 RX ADMIN — OXYCODONE AND ACETAMINOPHEN 1 TABLET: 7.5; 325 TABLET ORAL at 20:58

## 2023-09-04 RX ADMIN — HYDRALAZINE HYDROCHLORIDE 25 MG: 25 TABLET, FILM COATED ORAL at 13:44

## 2023-09-04 RX ADMIN — AMLODIPINE BESYLATE 5 MG: 5 TABLET ORAL at 13:40

## 2023-09-04 RX ADMIN — INSULIN LISPRO 3 UNITS: 100 INJECTION, SOLUTION INTRAVENOUS; SUBCUTANEOUS at 13:48

## 2023-09-04 RX ADMIN — OXYCODONE AND ACETAMINOPHEN 1 TABLET: 7.5; 325 TABLET ORAL at 13:44

## 2023-09-04 RX ADMIN — SODIUM CHLORIDE, PRESERVATIVE FREE 10 ML: 5 INJECTION INTRAVENOUS at 13:40

## 2023-09-04 RX ADMIN — PIPERACILLIN AND TAZOBACTAM 3375 MG: 3; .375 INJECTION, POWDER, LYOPHILIZED, FOR SOLUTION INTRAVENOUS at 16:40

## 2023-09-04 RX ADMIN — PIPERACILLIN AND TAZOBACTAM 3375 MG: 3; .375 INJECTION, POWDER, LYOPHILIZED, FOR SOLUTION INTRAVENOUS at 04:22

## 2023-09-04 RX ADMIN — CALCIUM ACETATE 667 MG: 667 CAPSULE ORAL at 16:40

## 2023-09-04 RX ADMIN — CARVEDILOL 25 MG: 25 TABLET, FILM COATED ORAL at 13:44

## 2023-09-04 RX ADMIN — INSULIN LISPRO 2 UNITS: 100 INJECTION, SOLUTION INTRAVENOUS; SUBCUTANEOUS at 16:39

## 2023-09-04 RX ADMIN — CALCIUM ACETATE 667 MG: 667 CAPSULE ORAL at 13:44

## 2023-09-04 RX ADMIN — ATORVASTATIN CALCIUM 40 MG: 40 TABLET, FILM COATED ORAL at 20:59

## 2023-09-04 RX ADMIN — HYDRALAZINE HYDROCHLORIDE 25 MG: 25 TABLET, FILM COATED ORAL at 06:04

## 2023-09-04 RX ADMIN — INSULIN LISPRO 3 UNITS: 100 INJECTION, SOLUTION INTRAVENOUS; SUBCUTANEOUS at 16:39

## 2023-09-04 RX ADMIN — PROPOFOL 130 MG: 10 INJECTION, EMULSION INTRAVENOUS at 07:45

## 2023-09-04 RX ADMIN — ARIPIPRAZOLE 5 MG: 10 TABLET ORAL at 20:58

## 2023-09-04 RX ADMIN — OXYCODONE AND ACETAMINOPHEN 1 TABLET: 7.5; 325 TABLET ORAL at 06:05

## 2023-09-04 RX ADMIN — EPOETIN ALFA-EPBX 10000 UNITS: 10000 INJECTION, SOLUTION INTRAVENOUS; SUBCUTANEOUS at 10:00

## 2023-09-04 ASSESSMENT — PAIN DESCRIPTION - ORIENTATION
ORIENTATION: LEFT

## 2023-09-04 ASSESSMENT — PAIN SCALES - GENERAL
PAINLEVEL_OUTOF10: 7
PAINLEVEL_OUTOF10: 7

## 2023-09-04 ASSESSMENT — PAIN DESCRIPTION - PAIN TYPE: TYPE: CHRONIC PAIN

## 2023-09-04 ASSESSMENT — PAIN DESCRIPTION - LOCATION
LOCATION: FOOT

## 2023-09-04 ASSESSMENT — PAIN DESCRIPTION - DESCRIPTORS
DESCRIPTORS: ACHING
DESCRIPTORS: OTHER (COMMENT)
DESCRIPTORS: ACHING

## 2023-09-04 ASSESSMENT — PAIN SCALES - WONG BAKER
WONGBAKER_NUMERICALRESPONSE: 6
WONGBAKER_NUMERICALRESPONSE: 6

## 2023-09-04 ASSESSMENT — PAIN - FUNCTIONAL ASSESSMENT: PAIN_FUNCTIONAL_ASSESSMENT: PREVENTS OR INTERFERES WITH MANY ACTIVE NOT PASSIVE ACTIVITIES

## 2023-09-04 ASSESSMENT — LIFESTYLE VARIABLES: SMOKING_STATUS: 1

## 2023-09-04 NOTE — PLAN OF CARE
Patient seen in dialysis this am. He is sleepy but easily awakens  He states his foot is feeling better.     Had EGD this am:  non-bleeding gastric ulcer noted, nodular gastric lesion biopsied    No fever last 24 hours, having BID dressing changes to left foot    Continue antibiotics and local wound care

## 2023-09-04 NOTE — ANESTHESIA PRE PROCEDURE
Department of Anesthesiology  Preprocedure Note       Name:  Cherylene Rima   Age:  50 y.o.  :  1975                                          MRN:  [id number]         Date:  2023      Surgeon: Torito Harris):  Bo Radford MD    Procedure: Procedure(s):  EGD DIAGNOSTIC ONLY    Medications prior to admission:   Prior to Admission medications    Medication Sig Start Date End Date Taking?  Authorizing Provider   insulin lispro, 1 Unit Dial, (HUMALOG KWIKPEN) 100 UNIT/ML SOPN Inject 0-4 Units into the skin 4 times daily (after meals and at bedtime) 0-4 Units, SubCUTAneous, 3 TIMES DAILY WITH MEALS and at bedtime  No Insulin 200-249 1 Unit 250-299 2 Units 300-349 3 Units and notify doctor Over 349 4 Units and notify physician 23   Bronwyn Tobias MD   glucose 4 g chewable tablet Take 4 tablets by mouth as needed for Low blood sugar 23   Bronwyn Tobias MD   insulin lispro, 1 Unit Dial, (HUMALOG KWIKPEN) 100 UNIT/ML SOPN Inject 0-4 Units into the skin 4 times daily (before meals and nightly)  No Insulin  Please check Finger Stick Glucose Before meals and at bedtime  If -249 give1 Unit  If 250-299 give 2 Units  If 300-349 give 3 Units and contact physician  If >350 give 4 units and contact physician 23  Bronwyn Tobias MD   hydrALAZINE (APRESOLINE) 25 MG tablet Take 1 tablet by mouth every 8 hours 23   Bronwyn Tobias MD   amLODIPine (NORVASC) 5 MG tablet Take 1 tablet by mouth daily 23   Bronwyn Tobias MD   sulfamethoxazole-trimethoprim (BACTRIM;SEPTRA) 400-80 MG per tablet Take 1 tablet by mouth every 12 hours for 81 doses 23  Bronwyn Tobias MD   calcium acetate (PHOSLO) 667 MG CAPS capsule Take 1 capsule by mouth 3 times daily (with meals) 23   Bronwyn Tobias MD   famotidine (PEPCID) 20 MG tablet Take 1 tablet by mouth daily 23   Bronwyn Tobias MD   atorvastatin (LIPITOR) 40 MG tablet Take 1 tablet by mouth nightly 2/24/23   Oh Rubio MD   calcitRIOL

## 2023-09-04 NOTE — ANESTHESIA POSTPROCEDURE EVALUATION
Department of Anesthesiology  Postprocedure Note    Patient: Andra Fraga  MRN: [id number]  YOB: 1975  Date of evaluation: 9/4/2023      Procedure Summary     Date: 09/04/23 Room / Location: [address] [place] [address]    Anesthesia Start: 0740 Anesthesia Stop: 0801    Procedure: EGD BIOPSY Diagnosis:       Melena      (Melena [K92.1])    Surgeons: Jaguar Guajardo MD Responsible Provider: Dahiana Singer MD    Anesthesia Type: MAC ASA Status: 4          Anesthesia Type: MAC    Marcus Phase I:      Marcus Phase II:        Anesthesia Post Evaluation    Patient location during evaluation: bedside  Patient participation: complete - patient participated  Level of consciousness: awake  Pain score: 2  Nausea & Vomiting: no nausea and no vomiting  Complications: no  Cardiovascular status: hemodynamically stable  Respiratory status: acceptable  Hydration status: euvolemic

## 2023-09-04 NOTE — ANESTHESIA POSTPROCEDURE EVALUATION
Department of Anesthesiology  Postprocedure Note    Patient: Cherylene Rima  MRN: [id number]  YOB: 1975  Date of evaluation: 9/4/2023      Procedure Summary     Date: 09/04/23 Room / Location: [address] [place] [address]    Anesthesia Start: 0740 Anesthesia Stop: 0801    Procedure: EGD BIOPSY Diagnosis:       Melena      (Melena [K92.1])    Surgeons: Bo Radford MD Responsible Provider: APPLE Coon CRNA    Anesthesia Type: MAC ASA Status: 4          Anesthesia Type: MAC    Marcus Phase I:      Marcus Phase II:        Anesthesia Post Evaluation    Patient location during evaluation: floor  Patient participation: complete - patient participated  Level of consciousness: awake and alert  Airway patency: patent  Nausea & Vomiting: no nausea and no vomiting  Complications: no  Cardiovascular status: hemodynamically stable  Respiratory status: spontaneous ventilation and room air  Hydration status: stable  Pain management: adequate

## 2023-09-05 ENCOUNTER — ANESTHESIA EVENT (OUTPATIENT)
Dept: OPERATING ROOM | Age: 48
End: 2023-09-05
Payer: MEDICARE

## 2023-09-05 PROBLEM — M86.172 ACUTE OSTEOMYELITIS OF METATARSAL BONE, LEFT (HCC): Status: ACTIVE | Noted: 2023-09-05

## 2023-09-05 LAB
ANION GAP SERPL CALCULATED.3IONS-SCNC: 12 MMOL/L (ref 4–16)
BASOPHILS ABSOLUTE: 0.1 K/CU MM
BASOPHILS RELATIVE PERCENT: 0.4 % (ref 0–1)
BUN SERPL-MCNC: 37 MG/DL (ref 6–23)
CALCIUM SERPL-MCNC: 7.3 MG/DL (ref 8.3–10.6)
CHLORIDE BLD-SCNC: 97 MMOL/L (ref 99–110)
CO2: 25 MMOL/L (ref 21–32)
CREAT SERPL-MCNC: 7 MG/DL (ref 0.9–1.3)
DIFFERENTIAL TYPE: ABNORMAL
EOSINOPHILS ABSOLUTE: 0.2 K/CU MM
EOSINOPHILS RELATIVE PERCENT: 1.9 % (ref 0–3)
GFR SERPL CREATININE-BSD FRML MDRD: 9 ML/MIN/1.73M2
GLUCOSE BLD-MCNC: 120 MG/DL (ref 70–99)
GLUCOSE BLD-MCNC: 163 MG/DL (ref 70–99)
GLUCOSE BLD-MCNC: 185 MG/DL (ref 70–99)
GLUCOSE BLD-MCNC: 208 MG/DL (ref 70–99)
GLUCOSE BLD-MCNC: 231 MG/DL (ref 70–99)
GLUCOSE SERPL-MCNC: 182 MG/DL (ref 70–99)
HCT VFR BLD CALC: 26.9 % (ref 42–52)
HEMOGLOBIN: 8.1 GM/DL (ref 13.5–18)
IMMATURE NEUTROPHIL %: 1.9 % (ref 0–0.43)
LYMPHOCYTES ABSOLUTE: 1 K/CU MM
LYMPHOCYTES RELATIVE PERCENT: 8.6 % (ref 24–44)
MCH RBC QN AUTO: 28.9 PG (ref 27–31)
MCHC RBC AUTO-ENTMCNC: 30.1 % (ref 32–36)
MCV RBC AUTO: 96.1 FL (ref 78–100)
MONOCYTES ABSOLUTE: 0.9 K/CU MM
MONOCYTES RELATIVE PERCENT: 7.6 % (ref 0–4)
NUCLEATED RBC %: 0.2 %
PDW BLD-RTO: 16.6 % (ref 11.7–14.9)
PLATELET # BLD: 270 K/CU MM (ref 140–440)
PMV BLD AUTO: 9.1 FL (ref 7.5–11.1)
POTASSIUM SERPL-SCNC: 4.6 MMOL/L (ref 3.5–5.1)
RBC # BLD: 2.8 M/CU MM (ref 4.6–6.2)
SEGMENTED NEUTROPHILS ABSOLUTE COUNT: 9.6 K/CU MM
SEGMENTED NEUTROPHILS RELATIVE PERCENT: 79.6 % (ref 36–66)
SODIUM BLD-SCNC: 134 MMOL/L (ref 135–145)
TOTAL IMMATURE NEUTOROPHIL: 0.23 K/CU MM
TOTAL NUCLEATED RBC: 0 K/CU MM
WBC # BLD: 12.1 K/CU MM (ref 4–10.5)

## 2023-09-05 PROCEDURE — 94761 N-INVAS EAR/PLS OXIMETRY MLT: CPT

## 2023-09-05 PROCEDURE — 36415 COLL VENOUS BLD VENIPUNCTURE: CPT

## 2023-09-05 PROCEDURE — 82962 GLUCOSE BLOOD TEST: CPT

## 2023-09-05 PROCEDURE — 1200000000 HC SEMI PRIVATE

## 2023-09-05 PROCEDURE — 6370000000 HC RX 637 (ALT 250 FOR IP): Performed by: STUDENT IN AN ORGANIZED HEALTH CARE EDUCATION/TRAINING PROGRAM

## 2023-09-05 PROCEDURE — 6370000000 HC RX 637 (ALT 250 FOR IP): Performed by: SURGERY

## 2023-09-05 PROCEDURE — 99232 SBSQ HOSP IP/OBS MODERATE 35: CPT | Performed by: NURSE PRACTITIONER

## 2023-09-05 PROCEDURE — 2500000003 HC RX 250 WO HCPCS: Performed by: SURGERY

## 2023-09-05 PROCEDURE — 6370000000 HC RX 637 (ALT 250 FOR IP)

## 2023-09-05 PROCEDURE — 85025 COMPLETE CBC W/AUTO DIFF WBC: CPT

## 2023-09-05 PROCEDURE — 6360000002 HC RX W HCPCS: Performed by: STUDENT IN AN ORGANIZED HEALTH CARE EDUCATION/TRAINING PROGRAM

## 2023-09-05 PROCEDURE — 2580000003 HC RX 258: Performed by: SURGERY

## 2023-09-05 PROCEDURE — 2580000003 HC RX 258: Performed by: STUDENT IN AN ORGANIZED HEALTH CARE EDUCATION/TRAINING PROGRAM

## 2023-09-05 PROCEDURE — 80048 BASIC METABOLIC PNL TOTAL CA: CPT

## 2023-09-05 RX ORDER — INSULIN GLARGINE 100 [IU]/ML
10 INJECTION, SOLUTION SUBCUTANEOUS NIGHTLY
Qty: 3 ADJUSTABLE DOSE PRE-FILLED PEN SYRINGE | Refills: 3 | Status: SHIPPED | OUTPATIENT
Start: 2023-09-05

## 2023-09-05 RX ORDER — PANTOPRAZOLE SODIUM 40 MG/1
40 TABLET, DELAYED RELEASE ORAL
Qty: 30 TABLET | Refills: 1 | Status: SHIPPED | OUTPATIENT
Start: 2023-09-05 | End: 2023-09-05 | Stop reason: SDUPTHER

## 2023-09-05 RX ORDER — PANTOPRAZOLE SODIUM 40 MG/1
40 TABLET, DELAYED RELEASE ORAL
Qty: 60 TABLET | Refills: 1 | Status: SHIPPED | OUTPATIENT
Start: 2023-09-05

## 2023-09-05 RX ORDER — INSULIN LISPRO 100 [IU]/ML
3 INJECTION, SOLUTION INTRAVENOUS; SUBCUTANEOUS
Qty: 3 ADJUSTABLE DOSE PRE-FILLED PEN SYRINGE | Refills: 3 | Status: SHIPPED | OUTPATIENT
Start: 2023-09-05

## 2023-09-05 RX ADMIN — CARVEDILOL 25 MG: 25 TABLET, FILM COATED ORAL at 09:00

## 2023-09-05 RX ADMIN — CARVEDILOL 25 MG: 25 TABLET, FILM COATED ORAL at 17:38

## 2023-09-05 RX ADMIN — PIPERACILLIN AND TAZOBACTAM 3375 MG: 3; .375 INJECTION, POWDER, LYOPHILIZED, FOR SOLUTION INTRAVENOUS at 05:09

## 2023-09-05 RX ADMIN — PANTOPRAZOLE SODIUM 40 MG: 40 TABLET, DELAYED RELEASE ORAL at 17:38

## 2023-09-05 RX ADMIN — SODIUM CHLORIDE, PRESERVATIVE FREE 10 ML: 5 INJECTION INTRAVENOUS at 09:01

## 2023-09-05 RX ADMIN — INSULIN LISPRO 3 UNITS: 100 INJECTION, SOLUTION INTRAVENOUS; SUBCUTANEOUS at 17:38

## 2023-09-05 RX ADMIN — INSULIN GLARGINE 10 UNITS: 100 INJECTION, SOLUTION SUBCUTANEOUS at 22:50

## 2023-09-05 RX ADMIN — CALCIUM ACETATE 667 MG: 667 CAPSULE ORAL at 12:51

## 2023-09-05 RX ADMIN — PANTOPRAZOLE SODIUM 40 MG: 40 TABLET, DELAYED RELEASE ORAL at 05:27

## 2023-09-05 RX ADMIN — CALCITRIOL CAPSULES 0.25 MCG 0.5 MCG: 0.25 CAPSULE ORAL at 09:00

## 2023-09-05 RX ADMIN — CALCIUM ACETATE 667 MG: 667 CAPSULE ORAL at 17:39

## 2023-09-05 RX ADMIN — INSULIN LISPRO 3 UNITS: 100 INJECTION, SOLUTION INTRAVENOUS; SUBCUTANEOUS at 12:51

## 2023-09-05 RX ADMIN — INSULIN LISPRO 3 UNITS: 100 INJECTION, SOLUTION INTRAVENOUS; SUBCUTANEOUS at 09:01

## 2023-09-05 RX ADMIN — POLYETHYLENE GLYCOL (3350) 17 G: 17 POWDER, FOR SOLUTION ORAL at 08:59

## 2023-09-05 RX ADMIN — INSULIN LISPRO 2 UNITS: 100 INJECTION, SOLUTION INTRAVENOUS; SUBCUTANEOUS at 12:51

## 2023-09-05 RX ADMIN — ATORVASTATIN CALCIUM 40 MG: 40 TABLET, FILM COATED ORAL at 22:49

## 2023-09-05 RX ADMIN — AMLODIPINE BESYLATE 5 MG: 5 TABLET ORAL at 08:59

## 2023-09-05 RX ADMIN — OXYCODONE AND ACETAMINOPHEN 1 TABLET: 7.5; 325 TABLET ORAL at 22:48

## 2023-09-05 RX ADMIN — ARIPIPRAZOLE 5 MG: 10 TABLET ORAL at 22:49

## 2023-09-05 RX ADMIN — CALCIUM ACETATE 667 MG: 667 CAPSULE ORAL at 08:59

## 2023-09-05 RX ADMIN — OXYCODONE AND ACETAMINOPHEN 1 TABLET: 7.5; 325 TABLET ORAL at 09:06

## 2023-09-05 ASSESSMENT — PAIN DESCRIPTION - LOCATION
LOCATION: FOOT
LOCATION: FOOT

## 2023-09-05 ASSESSMENT — PAIN - FUNCTIONAL ASSESSMENT: PAIN_FUNCTIONAL_ASSESSMENT: ACTIVITIES ARE NOT PREVENTED

## 2023-09-05 ASSESSMENT — PAIN DESCRIPTION - DESCRIPTORS
DESCRIPTORS: SHARP;SHOOTING
DESCRIPTORS: SHARP;STABBING

## 2023-09-05 ASSESSMENT — PAIN DESCRIPTION - ORIENTATION
ORIENTATION: LEFT
ORIENTATION: LEFT

## 2023-09-05 ASSESSMENT — PAIN SCALES - GENERAL
PAINLEVEL_OUTOF10: 8
PAINLEVEL_OUTOF10: 8

## 2023-09-05 ASSESSMENT — LIFESTYLE VARIABLES: SMOKING_STATUS: 1

## 2023-09-06 ENCOUNTER — ANESTHESIA (OUTPATIENT)
Dept: OPERATING ROOM | Age: 48
End: 2023-09-06
Payer: MEDICARE

## 2023-09-06 LAB
ANION GAP SERPL CALCULATED.3IONS-SCNC: 12 MMOL/L (ref 4–16)
BASOPHILS ABSOLUTE: 0 K/CU MM
BASOPHILS RELATIVE PERCENT: 0.3 % (ref 0–1)
BUN SERPL-MCNC: 26 MG/DL (ref 6–23)
CALCIUM SERPL-MCNC: 7.8 MG/DL (ref 8.3–10.6)
CHLORIDE BLD-SCNC: 97 MMOL/L (ref 99–110)
CO2: 27 MMOL/L (ref 21–32)
CREAT SERPL-MCNC: 5.2 MG/DL (ref 0.9–1.3)
DIFFERENTIAL TYPE: ABNORMAL
EOSINOPHILS ABSOLUTE: 0.2 K/CU MM
EOSINOPHILS RELATIVE PERCENT: 1.4 % (ref 0–3)
GFR SERPL CREATININE-BSD FRML MDRD: 13 ML/MIN/1.73M2
GLUCOSE BLD-MCNC: 121 MG/DL (ref 70–99)
GLUCOSE BLD-MCNC: 122 MG/DL (ref 70–99)
GLUCOSE BLD-MCNC: 124 MG/DL (ref 70–99)
GLUCOSE BLD-MCNC: 135 MG/DL (ref 70–99)
GLUCOSE BLD-MCNC: 160 MG/DL (ref 70–99)
GLUCOSE BLD-MCNC: 257 MG/DL (ref 70–99)
GLUCOSE SERPL-MCNC: 155 MG/DL (ref 70–99)
HCT VFR BLD CALC: 26.7 % (ref 42–52)
HEMOGLOBIN: 7.8 GM/DL (ref 13.5–18)
IMMATURE NEUTROPHIL %: 2.3 % (ref 0–0.43)
LYMPHOCYTES ABSOLUTE: 0.9 K/CU MM
LYMPHOCYTES RELATIVE PERCENT: 7.5 % (ref 24–44)
MCH RBC QN AUTO: 29.4 PG (ref 27–31)
MCHC RBC AUTO-ENTMCNC: 29.2 % (ref 32–36)
MCV RBC AUTO: 100.8 FL (ref 78–100)
MONOCYTES ABSOLUTE: 0.7 K/CU MM
MONOCYTES RELATIVE PERCENT: 6.1 % (ref 0–4)
NUCLEATED RBC %: 0 %
PDW BLD-RTO: 16.7 % (ref 11.7–14.9)
PLATELET # BLD: 249 K/CU MM (ref 140–440)
PMV BLD AUTO: 9.7 FL (ref 7.5–11.1)
POTASSIUM SERPL-SCNC: 4.6 MMOL/L (ref 3.5–5.1)
RBC # BLD: 2.65 M/CU MM (ref 4.6–6.2)
SEGMENTED NEUTROPHILS ABSOLUTE COUNT: 9.7 K/CU MM
SEGMENTED NEUTROPHILS RELATIVE PERCENT: 82.4 % (ref 36–66)
SODIUM BLD-SCNC: 136 MMOL/L (ref 135–145)
TOTAL IMMATURE NEUTOROPHIL: 0.27 K/CU MM
TOTAL NUCLEATED RBC: 0 K/CU MM
WBC # BLD: 11.8 K/CU MM (ref 4–10.5)

## 2023-09-06 PROCEDURE — 88311 DECALCIFY TISSUE: CPT

## 2023-09-06 PROCEDURE — 0Y6Q0Z0 DETACHMENT AT LEFT 1ST TOE, COMPLETE, OPEN APPROACH: ICD-10-PCS | Performed by: SURGERY

## 2023-09-06 PROCEDURE — 7100000001 HC PACU RECOVERY - ADDTL 15 MIN: Performed by: SURGERY

## 2023-09-06 PROCEDURE — 3700000000 HC ANESTHESIA ATTENDED CARE: Performed by: SURGERY

## 2023-09-06 PROCEDURE — 87070 CULTURE OTHR SPECIMN AEROBIC: CPT

## 2023-09-06 PROCEDURE — 6360000002 HC RX W HCPCS: Performed by: NURSE ANESTHETIST, CERTIFIED REGISTERED

## 2023-09-06 PROCEDURE — 6360000002 HC RX W HCPCS: Performed by: ANESTHESIOLOGY

## 2023-09-06 PROCEDURE — 2500000003 HC RX 250 WO HCPCS: Performed by: SURGERY

## 2023-09-06 PROCEDURE — 6370000000 HC RX 637 (ALT 250 FOR IP): Performed by: SURGERY

## 2023-09-06 PROCEDURE — 87077 CULTURE AEROBIC IDENTIFY: CPT

## 2023-09-06 PROCEDURE — 99233 SBSQ HOSP IP/OBS HIGH 50: CPT | Performed by: NURSE PRACTITIONER

## 2023-09-06 PROCEDURE — 80048 BASIC METABOLIC PNL TOTAL CA: CPT

## 2023-09-06 PROCEDURE — 1200000000 HC SEMI PRIVATE

## 2023-09-06 PROCEDURE — 6360000002 HC RX W HCPCS: Performed by: SURGERY

## 2023-09-06 PROCEDURE — 36415 COLL VENOUS BLD VENIPUNCTURE: CPT

## 2023-09-06 PROCEDURE — 3600000012 HC SURGERY LEVEL 2 ADDTL 15MIN: Performed by: SURGERY

## 2023-09-06 PROCEDURE — 6360000002 HC RX W HCPCS: Performed by: INTERNAL MEDICINE

## 2023-09-06 PROCEDURE — 85025 COMPLETE CBC W/AUTO DIFF WBC: CPT

## 2023-09-06 PROCEDURE — 6370000000 HC RX 637 (ALT 250 FOR IP): Performed by: STUDENT IN AN ORGANIZED HEALTH CARE EDUCATION/TRAINING PROGRAM

## 2023-09-06 PROCEDURE — 82962 GLUCOSE BLOOD TEST: CPT

## 2023-09-06 PROCEDURE — 3600000002 HC SURGERY LEVEL 2 BASE: Performed by: SURGERY

## 2023-09-06 PROCEDURE — 2580000003 HC RX 258: Performed by: SURGERY

## 2023-09-06 PROCEDURE — 87186 SC STD MICRODIL/AGAR DIL: CPT

## 2023-09-06 PROCEDURE — 90935 HEMODIALYSIS ONE EVALUATION: CPT

## 2023-09-06 PROCEDURE — 2709999900 HC NON-CHARGEABLE SUPPLY: Performed by: SURGERY

## 2023-09-06 PROCEDURE — 87116 MYCOBACTERIA CULTURE: CPT

## 2023-09-06 PROCEDURE — 7100000000 HC PACU RECOVERY - FIRST 15 MIN: Performed by: SURGERY

## 2023-09-06 PROCEDURE — 3700000001 HC ADD 15 MINUTES (ANESTHESIA): Performed by: SURGERY

## 2023-09-06 PROCEDURE — 87075 CULTR BACTERIA EXCEPT BLOOD: CPT

## 2023-09-06 PROCEDURE — 87205 SMEAR GRAM STAIN: CPT

## 2023-09-06 PROCEDURE — 94761 N-INVAS EAR/PLS OXIMETRY MLT: CPT

## 2023-09-06 PROCEDURE — 88305 TISSUE EXAM BY PATHOLOGIST: CPT

## 2023-09-06 RX ORDER — HEPARIN SODIUM 5000 [USP'U]/ML
5000 INJECTION, SOLUTION INTRAVENOUS; SUBCUTANEOUS EVERY 8 HOURS SCHEDULED
Status: DISCONTINUED | OUTPATIENT
Start: 2023-09-06 | End: 2023-09-12 | Stop reason: HOSPADM

## 2023-09-06 RX ORDER — ONDANSETRON 2 MG/ML
4 INJECTION INTRAMUSCULAR; INTRAVENOUS
Status: COMPLETED | OUTPATIENT
Start: 2023-09-06 | End: 2023-09-06

## 2023-09-06 RX ORDER — PROCHLORPERAZINE EDISYLATE 5 MG/ML
5 INJECTION INTRAMUSCULAR; INTRAVENOUS
Status: DISCONTINUED | OUTPATIENT
Start: 2023-09-06 | End: 2023-09-06 | Stop reason: HOSPADM

## 2023-09-06 RX ORDER — FENTANYL CITRATE 50 UG/ML
25 INJECTION, SOLUTION INTRAMUSCULAR; INTRAVENOUS EVERY 5 MIN PRN
Status: DISCONTINUED | OUTPATIENT
Start: 2023-09-06 | End: 2023-09-06 | Stop reason: HOSPADM

## 2023-09-06 RX ORDER — PROPOFOL 10 MG/ML
INJECTION, EMULSION INTRAVENOUS PRN
Status: DISCONTINUED | OUTPATIENT
Start: 2023-09-06 | End: 2023-09-06 | Stop reason: SDUPTHER

## 2023-09-06 RX ORDER — SODIUM CHLORIDE 9 MG/ML
INJECTION, SOLUTION INTRAVENOUS PRN
Status: DISCONTINUED | OUTPATIENT
Start: 2023-09-06 | End: 2023-09-06 | Stop reason: HOSPADM

## 2023-09-06 RX ORDER — OXYCODONE AND ACETAMINOPHEN 7.5; 325 MG/1; MG/1
2 TABLET ORAL EVERY 6 HOURS PRN
Status: DISCONTINUED | OUTPATIENT
Start: 2023-09-06 | End: 2023-09-12 | Stop reason: HOSPADM

## 2023-09-06 RX ORDER — FENTANYL CITRATE 50 UG/ML
INJECTION, SOLUTION INTRAMUSCULAR; INTRAVENOUS PRN
Status: DISCONTINUED | OUTPATIENT
Start: 2023-09-06 | End: 2023-09-06 | Stop reason: SDUPTHER

## 2023-09-06 RX ORDER — OXYCODONE HYDROCHLORIDE AND ACETAMINOPHEN 5; 325 MG/1; MG/1
1 TABLET ORAL EVERY 4 HOURS PRN
Status: DISCONTINUED | OUTPATIENT
Start: 2023-09-06 | End: 2023-09-12 | Stop reason: HOSPADM

## 2023-09-06 RX ORDER — ONDANSETRON 2 MG/ML
INJECTION INTRAMUSCULAR; INTRAVENOUS PRN
Status: DISCONTINUED | OUTPATIENT
Start: 2023-09-06 | End: 2023-09-06 | Stop reason: SDUPTHER

## 2023-09-06 RX ORDER — SODIUM CHLORIDE 0.9 % (FLUSH) 0.9 %
5-40 SYRINGE (ML) INJECTION PRN
Status: DISCONTINUED | OUTPATIENT
Start: 2023-09-06 | End: 2023-09-06 | Stop reason: HOSPADM

## 2023-09-06 RX ORDER — BUPIVACAINE HYDROCHLORIDE 5 MG/ML
INJECTION, SOLUTION EPIDURAL; INTRACAUDAL
Status: COMPLETED | OUTPATIENT
Start: 2023-09-06 | End: 2023-09-06

## 2023-09-06 RX ORDER — SODIUM CHLORIDE 0.9 % (FLUSH) 0.9 %
5-40 SYRINGE (ML) INJECTION EVERY 12 HOURS SCHEDULED
Status: DISCONTINUED | OUTPATIENT
Start: 2023-09-06 | End: 2023-09-06 | Stop reason: HOSPADM

## 2023-09-06 RX ADMIN — HYDRALAZINE HYDROCHLORIDE 25 MG: 25 TABLET, FILM COATED ORAL at 06:56

## 2023-09-06 RX ADMIN — PANTOPRAZOLE SODIUM 40 MG: 40 TABLET, DELAYED RELEASE ORAL at 16:59

## 2023-09-06 RX ADMIN — EPOETIN ALFA-EPBX 10000 UNITS: 10000 INJECTION, SOLUTION INTRAVENOUS; SUBCUTANEOUS at 10:06

## 2023-09-06 RX ADMIN — ARIPIPRAZOLE 5 MG: 10 TABLET ORAL at 22:10

## 2023-09-06 RX ADMIN — ONDANSETRON 4 MG: 2 INJECTION INTRAMUSCULAR; INTRAVENOUS at 15:04

## 2023-09-06 RX ADMIN — SODIUM CHLORIDE, PRESERVATIVE FREE 10 ML: 5 INJECTION INTRAVENOUS at 22:13

## 2023-09-06 RX ADMIN — HEPARIN SODIUM 5000 UNITS: 5000 INJECTION INTRAVENOUS; SUBCUTANEOUS at 22:11

## 2023-09-06 RX ADMIN — CEFEPIME 2000 MG: 2 INJECTION, POWDER, FOR SOLUTION INTRAVENOUS at 17:04

## 2023-09-06 RX ADMIN — ONDANSETRON 4 MG: 2 INJECTION INTRAMUSCULAR; INTRAVENOUS at 14:48

## 2023-09-06 RX ADMIN — FENTANYL CITRATE 25 MCG: 50 INJECTION, SOLUTION INTRAMUSCULAR; INTRAVENOUS at 14:25

## 2023-09-06 RX ADMIN — CARVEDILOL 25 MG: 25 TABLET, FILM COATED ORAL at 16:59

## 2023-09-06 RX ADMIN — INSULIN LISPRO 3 UNITS: 100 INJECTION, SOLUTION INTRAVENOUS; SUBCUTANEOUS at 16:59

## 2023-09-06 RX ADMIN — HYDRALAZINE HYDROCHLORIDE 25 MG: 25 TABLET, FILM COATED ORAL at 16:59

## 2023-09-06 RX ADMIN — HYDRALAZINE HYDROCHLORIDE 25 MG: 25 TABLET, FILM COATED ORAL at 22:11

## 2023-09-06 RX ADMIN — PROPOFOL 150 MG: 10 INJECTION, EMULSION INTRAVENOUS at 14:10

## 2023-09-06 RX ADMIN — PROPOFOL 100 MG: 10 INJECTION, EMULSION INTRAVENOUS at 14:23

## 2023-09-06 RX ADMIN — OXYCODONE AND ACETAMINOPHEN 1 TABLET: 7.5; 325 TABLET ORAL at 12:43

## 2023-09-06 RX ADMIN — ATORVASTATIN CALCIUM 40 MG: 40 TABLET, FILM COATED ORAL at 22:11

## 2023-09-06 RX ADMIN — HYDROMORPHONE HYDROCHLORIDE 0.5 MG: 1 INJECTION, SOLUTION INTRAMUSCULAR; INTRAVENOUS; SUBCUTANEOUS at 15:18

## 2023-09-06 RX ADMIN — OXYCODONE AND ACETAMINOPHEN 2 TABLET: 7.5; 325 TABLET ORAL at 18:57

## 2023-09-06 RX ADMIN — CALCIUM ACETATE 667 MG: 667 CAPSULE ORAL at 16:59

## 2023-09-06 RX ADMIN — PANTOPRAZOLE SODIUM 40 MG: 40 TABLET, DELAYED RELEASE ORAL at 06:56

## 2023-09-06 RX ADMIN — CALCIUM ACETATE 667 MG: 667 CAPSULE ORAL at 12:43

## 2023-09-06 RX ADMIN — INSULIN GLARGINE 10 UNITS: 100 INJECTION, SOLUTION SUBCUTANEOUS at 22:11

## 2023-09-06 RX ADMIN — HYDROMORPHONE HYDROCHLORIDE 0.5 MG: 1 INJECTION, SOLUTION INTRAMUSCULAR; INTRAVENOUS; SUBCUTANEOUS at 15:08

## 2023-09-06 ASSESSMENT — PAIN SCALES - GENERAL
PAINLEVEL_OUTOF10: 4
PAINLEVEL_OUTOF10: 9
PAINLEVEL_OUTOF10: 9
PAINLEVEL_OUTOF10: 7
PAINLEVEL_OUTOF10: 8
PAINLEVEL_OUTOF10: 9
PAINLEVEL_OUTOF10: 0
PAINLEVEL_OUTOF10: 7
PAINLEVEL_OUTOF10: 8

## 2023-09-06 ASSESSMENT — PAIN DESCRIPTION - FREQUENCY
FREQUENCY: CONTINUOUS

## 2023-09-06 ASSESSMENT — PAIN DESCRIPTION - DESCRIPTORS
DESCRIPTORS: THROBBING
DESCRIPTORS: THROBBING
DESCRIPTORS: SHARP;SHOOTING;THROBBING
DESCRIPTORS: SHARP;SHOOTING
DESCRIPTORS: THROBBING
DESCRIPTORS: SHARP

## 2023-09-06 ASSESSMENT — PAIN DESCRIPTION - LOCATION
LOCATION: FOOT

## 2023-09-06 ASSESSMENT — PAIN DESCRIPTION - ORIENTATION
ORIENTATION: LEFT
ORIENTATION: RIGHT
ORIENTATION: LEFT

## 2023-09-06 ASSESSMENT — PAIN DESCRIPTION - ONSET
ONSET: ON-GOING

## 2023-09-06 ASSESSMENT — PAIN SCALES - WONG BAKER
WONGBAKER_NUMERICALRESPONSE: 6
WONGBAKER_NUMERICALRESPONSE: 6

## 2023-09-06 ASSESSMENT — PAIN DESCRIPTION - PAIN TYPE
TYPE: SURGICAL PAIN

## 2023-09-06 ASSESSMENT — PAIN - FUNCTIONAL ASSESSMENT
PAIN_FUNCTIONAL_ASSESSMENT: PREVENTS OR INTERFERES SOME ACTIVE ACTIVITIES AND ADLS
PAIN_FUNCTIONAL_ASSESSMENT: PREVENTS OR INTERFERES SOME ACTIVE ACTIVITIES AND ADLS
PAIN_FUNCTIONAL_ASSESSMENT: ACTIVITIES ARE NOT PREVENTED
PAIN_FUNCTIONAL_ASSESSMENT: PREVENTS OR INTERFERES SOME ACTIVE ACTIVITIES AND ADLS

## 2023-09-06 ASSESSMENT — LIFESTYLE VARIABLES: SMOKING_STATUS: 1

## 2023-09-06 NOTE — BRIEF OP NOTE
Brief Postoperative Note      Patient: Shay Gomez  YOB: 1975  MRN: [id number]    Date of Procedure: 9/6/2023    Pre-Op Diagnosis Codes:     * Infection [B99.9] osteomyelitis ist metatarsal    Post-Op Diagnosis: Same       Procedure(s):  GREAT TOE TRANSMETATARSAL AMPUTATION    Surgeon(s):  Jose Padron MD    Assistant:  First Assistant: Cesar Littlejohn    Anesthesia: General    Estimated Blood Loss (mL): Minimal    Complications: None    Specimens:   ID Type Source Tests Collected by Time Destination   1 : Left foot abscess  Body Fluid Fluid CULTURE WITH SMEAR, ACID FAST Ezra Harper MD 9/6/2023 1424    A : Left great toe  Tissue Tissue SURGICAL PATHOLOGY Jose Padron MD 9/6/2023 1439        Implants:  * No implants in log *      Drains: * No LDAs found *    Findings: see dictated note      Electronically signed by Jose Padron MD on 9/6/2023 at 2:59 PM
Brief Postoperative Note      Patient: Yesenia Momin  YOB: 1975  MRN: [id number]    Date of Procedure: 8/30/2023    Pre-Op Diagnosis Codes:     * Infection [B99.9]    Post-Op Diagnosis:  plantar abscess osteomyelitis ist metatarsal       Procedure(s):  FOOT DEBRIDEMENT INCISION AND DRAINAGE    Surgeon(s):  Rei Ugalde MD    Assistant:  * No surgical staff found *    Anesthesia: Monitor Anesthesia Care    Estimated Blood Loss (mL): Minimal    Complications: None    Specimens:   ID Type Source Tests Collected by Time Destination   1 :  Left foot wound culture 1 Specimen Foot CULTURE, SURGICAL Rei Ugalde MD 8/30/2023 1434    2 : Left foot wound bone culture Specimen Foot CULTURE, SURGICAL Rei Ugalde MD 8/30/2023 1435        Implants:  * No implants in log *      Drains: * No LDAs found *    Findings: see dictated notes for details      Electronically signed by Rei Ugalde MD on 8/30/2023 at 2:39 PM
food (residue) in the stomach. Not attempted. -  A single lesion suspicious for aberrant pancreas was found in the stomach. Biopsied.          -  Normal examined duodenum. Recommendation:          -  Await pathology results. -  Return to GI clinic in 2 weeks. -  The findings and recommendations were discussed with the patient and their family.          -  [facility]. -  Use a proton pump inhibitor PO BID for 8 weeks. - Outpatient EUS for eval of nodule based on pathology. -  Resume previous diet. -  Continue present medications.    _____________________________________________________________________  [address] [facility] gastroenterology - [facility]  9/4/2023  9:58 AM

## 2023-09-06 NOTE — CARE COORDINATION
Chart reviewed and patient discussed in IDR. Patient is going today for toe amputation. Will need therapy recs after. [address] [place] [address] is able to accept patient is he stills plans on home with [address] [address] [address]. CM following for safe DC.

## 2023-09-06 NOTE — ANESTHESIA POSTPROCEDURE EVALUATION
Department of Anesthesiology  Postprocedure Note    Patient: Sharon Vazquez  MRN: [id number]  YOB: 1975  Date of evaluation: 9/6/2023      Procedure Summary     Date: 09/06/23 Room / Location: [address] [place] [address]    Anesthesia Start: 7706 Anesthesia Stop: 1501    Procedure: GREAT TOE TRANSMETATARSAL AMPUTATION (Left) Diagnosis:       Infection      (Infection [B99.9])    Surgeons: Henry Wagner MD Responsible Provider: Sulema Piedra MD    Anesthesia Type: General ASA Status: 4          Anesthesia Type: General    Marcus Phase I: Marcus Score: 10    Marcus Phase II:        Anesthesia Post Evaluation    Patient location during evaluation: PACU  Patient participation: complete - patient participated  Level of consciousness: awake and alert  Pain score: 0  Airway patency: patent  Nausea & Vomiting: no vomiting and no nausea  Complications: no  Cardiovascular status: blood pressure returned to baseline and hemodynamically stable  Respiratory status: acceptable, spontaneous ventilation, nonlabored ventilation and nasal cannula  Hydration status: stable  Pain management: adequate

## 2023-09-06 NOTE — OP NOTE
[address], [address] [place] [address]                                OPERATIVE REPORT    PATIENT NAME: Tatum Hinojosa                     :        1975  MED REC NO:   [id number]                          ROOM:  ACCOUNT NO:   [de-identified]                           ADMIT DATE: 2023  PROVIDER:     José Antonio Nettles MD    DATE OF PROCEDURE:  2023    PREOPERATIVE DIAGNOSES:  Left diabetic foot infection with plantar  abscess and osteomyelitis of the first metatarsal.    POSTOPERATIVE DIAGNOSES:  Left diabetic foot infection with plantar  abscess and osteomyelitis of the first metatarsal.    OPERATION PERFORMED:  Amputation of the left great toe through mid  metatarsal with drainage of plantar abscess and debridement of skin and  subcutaneous tissue, muscle, and fascia. OPERATING SURGEON:  José Antonio Nettles MD    ANESTHESIA:  General anesthesia plus 30 ml of 0.5% Marcaine was used  locally. ESTIMATED BLOOD LOSS:  25 mL. COMPLICATIONS:  None. DETAILS OF THE PROCEDURE:  The patient brought to the operating room  where general anesthetic was administered. The left foot was prepped  and draped in a normal sterile fashion. I could express pus through the  open wound on the medial plantar aspect of the left foot. I took a  scalpel and extended the incision along this area and opened up the  plantar space draining a plantar abscess. I then made a circumferential  incision around the base of the great toe through the skin and  subcutaneous tissue and used a cautery for hemostasis. I then extended  this through the tendinous attachments down to the metatarsophalangeal  joint and then amputated the toe through this joint.   There was evidence  of bony destruction and bone involvement of the distal metatarsals, so I  elevated the periosteum and then divided the proximal metatarsal with an  electric saw and passed this off

## 2023-09-07 LAB
ANION GAP SERPL CALCULATED.3IONS-SCNC: 12 MMOL/L (ref 4–16)
BASOPHILS ABSOLUTE: 0.1 K/CU MM
BASOPHILS RELATIVE PERCENT: 0.5 % (ref 0–1)
BUN SERPL-MCNC: 36 MG/DL (ref 6–23)
CALCIUM SERPL-MCNC: 7.5 MG/DL (ref 8.3–10.6)
CHLORIDE BLD-SCNC: 99 MMOL/L (ref 99–110)
CO2: 26 MMOL/L (ref 21–32)
CREAT SERPL-MCNC: 6.2 MG/DL (ref 0.9–1.3)
DIFFERENTIAL TYPE: ABNORMAL
EOSINOPHILS ABSOLUTE: 0.2 K/CU MM
EOSINOPHILS RELATIVE PERCENT: 1.5 % (ref 0–3)
GFR SERPL CREATININE-BSD FRML MDRD: 10 ML/MIN/1.73M2
GLUCOSE BLD-MCNC: 142 MG/DL (ref 70–99)
GLUCOSE BLD-MCNC: 175 MG/DL (ref 70–99)
GLUCOSE BLD-MCNC: 178 MG/DL (ref 70–99)
GLUCOSE BLD-MCNC: 193 MG/DL (ref 70–99)
GLUCOSE SERPL-MCNC: 186 MG/DL (ref 70–99)
HCT VFR BLD CALC: 26.9 % (ref 42–52)
HEMOGLOBIN: 8 GM/DL (ref 13.5–18)
IMMATURE NEUTROPHIL %: 3 % (ref 0–0.43)
LYMPHOCYTES ABSOLUTE: 1 K/CU MM
LYMPHOCYTES RELATIVE PERCENT: 8.1 % (ref 24–44)
MAGNESIUM: 2.2 MG/DL (ref 1.8–2.4)
MCH RBC QN AUTO: 29.5 PG (ref 27–31)
MCHC RBC AUTO-ENTMCNC: 29.7 % (ref 32–36)
MCV RBC AUTO: 99.3 FL (ref 78–100)
MONOCYTES ABSOLUTE: 0.8 K/CU MM
MONOCYTES RELATIVE PERCENT: 6.8 % (ref 0–4)
NUCLEATED RBC %: 0 %
PDW BLD-RTO: 16.9 % (ref 11.7–14.9)
PHOSPHORUS: 4.3 MG/DL (ref 2.5–4.9)
PLATELET # BLD: 291 K/CU MM (ref 140–440)
PMV BLD AUTO: 9.3 FL (ref 7.5–11.1)
POTASSIUM SERPL-SCNC: 4.9 MMOL/L (ref 3.5–5.1)
RBC # BLD: 2.71 M/CU MM (ref 4.6–6.2)
SEGMENTED NEUTROPHILS ABSOLUTE COUNT: 10 K/CU MM
SEGMENTED NEUTROPHILS RELATIVE PERCENT: 80.1 % (ref 36–66)
SODIUM BLD-SCNC: 137 MMOL/L (ref 135–145)
TOTAL IMMATURE NEUTOROPHIL: 0.37 K/CU MM
TOTAL NUCLEATED RBC: 0 K/CU MM
WBC # BLD: 12.4 K/CU MM (ref 4–10.5)

## 2023-09-07 PROCEDURE — 6370000000 HC RX 637 (ALT 250 FOR IP): Performed by: SURGERY

## 2023-09-07 PROCEDURE — 80048 BASIC METABOLIC PNL TOTAL CA: CPT

## 2023-09-07 PROCEDURE — 99232 SBSQ HOSP IP/OBS MODERATE 35: CPT | Performed by: NURSE PRACTITIONER

## 2023-09-07 PROCEDURE — 2580000003 HC RX 258: Performed by: SURGERY

## 2023-09-07 PROCEDURE — 83735 ASSAY OF MAGNESIUM: CPT

## 2023-09-07 PROCEDURE — 82962 GLUCOSE BLOOD TEST: CPT

## 2023-09-07 PROCEDURE — 1200000000 HC SEMI PRIVATE

## 2023-09-07 PROCEDURE — 6360000002 HC RX W HCPCS: Performed by: SURGERY

## 2023-09-07 PROCEDURE — 85025 COMPLETE CBC W/AUTO DIFF WBC: CPT

## 2023-09-07 PROCEDURE — 94761 N-INVAS EAR/PLS OXIMETRY MLT: CPT

## 2023-09-07 PROCEDURE — 2500000003 HC RX 250 WO HCPCS: Performed by: SURGERY

## 2023-09-07 PROCEDURE — 36415 COLL VENOUS BLD VENIPUNCTURE: CPT

## 2023-09-07 PROCEDURE — 84100 ASSAY OF PHOSPHORUS: CPT

## 2023-09-07 RX ADMIN — INSULIN LISPRO 3 UNITS: 100 INJECTION, SOLUTION INTRAVENOUS; SUBCUTANEOUS at 09:58

## 2023-09-07 RX ADMIN — OXYCODONE AND ACETAMINOPHEN 2 TABLET: 7.5; 325 TABLET ORAL at 05:33

## 2023-09-07 RX ADMIN — HEPARIN SODIUM 5000 UNITS: 5000 INJECTION INTRAVENOUS; SUBCUTANEOUS at 22:35

## 2023-09-07 RX ADMIN — PANTOPRAZOLE SODIUM 40 MG: 40 TABLET, DELAYED RELEASE ORAL at 15:20

## 2023-09-07 RX ADMIN — SODIUM CHLORIDE, PRESERVATIVE FREE 10 ML: 5 INJECTION INTRAVENOUS at 10:02

## 2023-09-07 RX ADMIN — OXYCODONE AND ACETAMINOPHEN 2 TABLET: 7.5; 325 TABLET ORAL at 12:58

## 2023-09-07 RX ADMIN — INSULIN LISPRO 3 UNITS: 100 INJECTION, SOLUTION INTRAVENOUS; SUBCUTANEOUS at 17:04

## 2023-09-07 RX ADMIN — INSULIN LISPRO 3 UNITS: 100 INJECTION, SOLUTION INTRAVENOUS; SUBCUTANEOUS at 12:55

## 2023-09-07 RX ADMIN — INSULIN GLARGINE 10 UNITS: 100 INJECTION, SOLUTION SUBCUTANEOUS at 22:41

## 2023-09-07 RX ADMIN — ONDANSETRON 4 MG: 2 INJECTION INTRAMUSCULAR; INTRAVENOUS at 19:22

## 2023-09-07 RX ADMIN — PANTOPRAZOLE SODIUM 40 MG: 40 TABLET, DELAYED RELEASE ORAL at 05:33

## 2023-09-07 RX ADMIN — SODIUM CHLORIDE, PRESERVATIVE FREE 10 ML: 5 INJECTION INTRAVENOUS at 22:35

## 2023-09-07 RX ADMIN — CALCIUM ACETATE 667 MG: 667 CAPSULE ORAL at 17:03

## 2023-09-07 RX ADMIN — HYDRALAZINE HYDROCHLORIDE 25 MG: 25 TABLET, FILM COATED ORAL at 22:36

## 2023-09-07 RX ADMIN — CALCIUM ACETATE 667 MG: 667 CAPSULE ORAL at 12:54

## 2023-09-07 RX ADMIN — ARIPIPRAZOLE 5 MG: 10 TABLET ORAL at 22:35

## 2023-09-07 RX ADMIN — CARVEDILOL 25 MG: 25 TABLET, FILM COATED ORAL at 17:07

## 2023-09-07 RX ADMIN — CALCITRIOL CAPSULES 0.25 MCG 0.5 MCG: 0.25 CAPSULE ORAL at 09:51

## 2023-09-07 RX ADMIN — ATORVASTATIN CALCIUM 40 MG: 40 TABLET, FILM COATED ORAL at 22:35

## 2023-09-07 RX ADMIN — HYDROMORPHONE HYDROCHLORIDE 0.5 MG: 1 INJECTION, SOLUTION INTRAMUSCULAR; INTRAVENOUS; SUBCUTANEOUS at 09:59

## 2023-09-07 RX ADMIN — HYDRALAZINE HYDROCHLORIDE 25 MG: 25 TABLET, FILM COATED ORAL at 15:20

## 2023-09-07 RX ADMIN — OXYCODONE AND ACETAMINOPHEN 2 TABLET: 7.5; 325 TABLET ORAL at 20:05

## 2023-09-07 RX ADMIN — CALCIUM ACETATE 667 MG: 667 CAPSULE ORAL at 09:58

## 2023-09-07 RX ADMIN — HEPARIN SODIUM 5000 UNITS: 5000 INJECTION INTRAVENOUS; SUBCUTANEOUS at 05:33

## 2023-09-07 RX ADMIN — HEPARIN SODIUM 5000 UNITS: 5000 INJECTION INTRAVENOUS; SUBCUTANEOUS at 15:38

## 2023-09-07 RX ADMIN — HYDROMORPHONE HYDROCHLORIDE 0.5 MG: 1 INJECTION, SOLUTION INTRAMUSCULAR; INTRAVENOUS; SUBCUTANEOUS at 15:17

## 2023-09-07 ASSESSMENT — PAIN DESCRIPTION - ORIENTATION
ORIENTATION: LEFT

## 2023-09-07 ASSESSMENT — PAIN DESCRIPTION - DESCRIPTORS
DESCRIPTORS: ACHING;THROBBING
DESCRIPTORS: SHARP;SHOOTING
DESCRIPTORS: SHOOTING;SHARP

## 2023-09-07 ASSESSMENT — PAIN DESCRIPTION - LOCATION
LOCATION: FOOT
LOCATION: LEG
LOCATION: FOOT
LOCATION: LEG

## 2023-09-07 ASSESSMENT — PAIN SCALES - GENERAL
PAINLEVEL_OUTOF10: 7
PAINLEVEL_OUTOF10: 6
PAINLEVEL_OUTOF10: 6
PAINLEVEL_OUTOF10: 7
PAINLEVEL_OUTOF10: 9
PAINLEVEL_OUTOF10: 8

## 2023-09-07 NOTE — CARE COORDINATION
Chart reviewed and patient discussed in IDR. Patient had amputation yesterday. He has an open wound, wound will need closed in 5 days per provider. Patient is going home on IVAB, these will be administered during HD appointments. CM following. Plan is home with [facility]. CM continuing to follow.

## 2023-09-08 LAB
GLUCOSE BLD-MCNC: 168 MG/DL (ref 70–99)
GLUCOSE BLD-MCNC: 172 MG/DL (ref 70–99)
GLUCOSE BLD-MCNC: 193 MG/DL (ref 70–99)
MAGNESIUM: 2.1 MG/DL (ref 1.8–2.4)
PHOSPHORUS: 5.2 MG/DL (ref 2.5–4.9)

## 2023-09-08 PROCEDURE — 6360000002 HC RX W HCPCS: Performed by: SURGERY

## 2023-09-08 PROCEDURE — 84100 ASSAY OF PHOSPHORUS: CPT

## 2023-09-08 PROCEDURE — 2500000003 HC RX 250 WO HCPCS: Performed by: SURGERY

## 2023-09-08 PROCEDURE — 6370000000 HC RX 637 (ALT 250 FOR IP): Performed by: SURGERY

## 2023-09-08 PROCEDURE — 36415 COLL VENOUS BLD VENIPUNCTURE: CPT

## 2023-09-08 PROCEDURE — 82962 GLUCOSE BLOOD TEST: CPT

## 2023-09-08 PROCEDURE — 1200000000 HC SEMI PRIVATE

## 2023-09-08 PROCEDURE — 2580000003 HC RX 258: Performed by: SURGERY

## 2023-09-08 PROCEDURE — 99233 SBSQ HOSP IP/OBS HIGH 50: CPT | Performed by: NURSE PRACTITIONER

## 2023-09-08 PROCEDURE — 94761 N-INVAS EAR/PLS OXIMETRY MLT: CPT

## 2023-09-08 PROCEDURE — 83735 ASSAY OF MAGNESIUM: CPT

## 2023-09-08 PROCEDURE — 90935 HEMODIALYSIS ONE EVALUATION: CPT

## 2023-09-08 RX ADMIN — SODIUM CHLORIDE, PRESERVATIVE FREE 10 ML: 5 INJECTION INTRAVENOUS at 20:51

## 2023-09-08 RX ADMIN — CARVEDILOL 25 MG: 25 TABLET, FILM COATED ORAL at 13:12

## 2023-09-08 RX ADMIN — PANTOPRAZOLE SODIUM 40 MG: 40 TABLET, DELAYED RELEASE ORAL at 18:31

## 2023-09-08 RX ADMIN — INSULIN LISPRO 3 UNITS: 100 INJECTION, SOLUTION INTRAVENOUS; SUBCUTANEOUS at 13:00

## 2023-09-08 RX ADMIN — ONDANSETRON 4 MG: 2 INJECTION INTRAMUSCULAR; INTRAVENOUS at 19:36

## 2023-09-08 RX ADMIN — HYDROMORPHONE HYDROCHLORIDE 0.5 MG: 1 INJECTION, SOLUTION INTRAMUSCULAR; INTRAVENOUS; SUBCUTANEOUS at 06:40

## 2023-09-08 RX ADMIN — INSULIN GLARGINE 10 UNITS: 100 INJECTION, SOLUTION SUBCUTANEOUS at 20:51

## 2023-09-08 RX ADMIN — OXYCODONE AND ACETAMINOPHEN 2 TABLET: 7.5; 325 TABLET ORAL at 19:35

## 2023-09-08 RX ADMIN — ATORVASTATIN CALCIUM 40 MG: 40 TABLET, FILM COATED ORAL at 20:50

## 2023-09-08 RX ADMIN — SODIUM CHLORIDE: 9 INJECTION, SOLUTION INTRAVENOUS at 15:16

## 2023-09-08 RX ADMIN — CALCITRIOL CAPSULES 0.25 MCG 0.5 MCG: 0.25 CAPSULE ORAL at 13:13

## 2023-09-08 RX ADMIN — HYDRALAZINE HYDROCHLORIDE 25 MG: 25 TABLET, FILM COATED ORAL at 20:50

## 2023-09-08 RX ADMIN — CARVEDILOL 25 MG: 25 TABLET, FILM COATED ORAL at 18:31

## 2023-09-08 RX ADMIN — HEPARIN SODIUM 5000 UNITS: 5000 INJECTION INTRAVENOUS; SUBCUTANEOUS at 20:50

## 2023-09-08 RX ADMIN — SODIUM CHLORIDE, PRESERVATIVE FREE 10 ML: 5 INJECTION INTRAVENOUS at 13:14

## 2023-09-08 RX ADMIN — OXYCODONE AND ACETAMINOPHEN 2 TABLET: 7.5; 325 TABLET ORAL at 13:12

## 2023-09-08 RX ADMIN — HEPARIN SODIUM 5000 UNITS: 5000 INJECTION INTRAVENOUS; SUBCUTANEOUS at 13:13

## 2023-09-08 RX ADMIN — CEFEPIME 3000 MG: 1 INJECTION, POWDER, FOR SOLUTION INTRAMUSCULAR; INTRAVENOUS at 15:16

## 2023-09-08 RX ADMIN — PANTOPRAZOLE SODIUM 40 MG: 40 TABLET, DELAYED RELEASE ORAL at 06:42

## 2023-09-08 RX ADMIN — CALCIUM ACETATE 667 MG: 667 CAPSULE ORAL at 13:12

## 2023-09-08 RX ADMIN — SODIUM CHLORIDE: 9 INJECTION, SOLUTION INTRAVENOUS at 15:00

## 2023-09-08 RX ADMIN — CALCIUM ACETATE 667 MG: 667 CAPSULE ORAL at 18:32

## 2023-09-08 RX ADMIN — HYDRALAZINE HYDROCHLORIDE 25 MG: 25 TABLET, FILM COATED ORAL at 13:12

## 2023-09-08 RX ADMIN — ARIPIPRAZOLE 5 MG: 10 TABLET ORAL at 20:50

## 2023-09-08 RX ADMIN — HEPARIN SODIUM 5000 UNITS: 5000 INJECTION INTRAVENOUS; SUBCUTANEOUS at 06:39

## 2023-09-08 RX ADMIN — AMLODIPINE BESYLATE 5 MG: 5 TABLET ORAL at 13:13

## 2023-09-08 RX ADMIN — ONDANSETRON 4 MG: 2 INJECTION INTRAMUSCULAR; INTRAVENOUS at 13:20

## 2023-09-08 RX ADMIN — HYDRALAZINE HYDROCHLORIDE 25 MG: 25 TABLET, FILM COATED ORAL at 06:41

## 2023-09-08 ASSESSMENT — PAIN SCALES - GENERAL
PAINLEVEL_OUTOF10: 8
PAINLEVEL_OUTOF10: 8
PAINLEVEL_OUTOF10: 7
PAINLEVEL_OUTOF10: 8
PAINLEVEL_OUTOF10: 7
PAINLEVEL_OUTOF10: 7

## 2023-09-08 ASSESSMENT — PAIN DESCRIPTION - PAIN TYPE
TYPE: SURGICAL PAIN
TYPE: SURGICAL PAIN

## 2023-09-08 ASSESSMENT — PAIN DESCRIPTION - ORIENTATION
ORIENTATION: LEFT

## 2023-09-08 ASSESSMENT — PAIN DESCRIPTION - ONSET
ONSET: ON-GOING
ONSET: ON-GOING

## 2023-09-08 ASSESSMENT — PAIN DESCRIPTION - DESCRIPTORS
DESCRIPTORS: ACHING
DESCRIPTORS: SHARP
DESCRIPTORS: ACHING
DESCRIPTORS: SHARP
DESCRIPTORS: SHOOTING

## 2023-09-08 ASSESSMENT — PAIN SCALES - WONG BAKER: WONGBAKER_NUMERICALRESPONSE: 6

## 2023-09-08 ASSESSMENT — PAIN DESCRIPTION - LOCATION
LOCATION: FOOT

## 2023-09-08 ASSESSMENT — PAIN - FUNCTIONAL ASSESSMENT
PAIN_FUNCTIONAL_ASSESSMENT: ACTIVITIES ARE NOT PREVENTED

## 2023-09-08 ASSESSMENT — PAIN DESCRIPTION - FREQUENCY
FREQUENCY: CONTINUOUS
FREQUENCY: CONTINUOUS

## 2023-09-08 NOTE — CARE COORDINATION
Chart reviewed and patient discussed in IDR. HD today. Patient will have surgery toclose wound on Monday. IVAB. Plan is home with [facility]. [facility] has accepted.

## 2023-09-09 LAB
GLUCOSE BLD-MCNC: 119 MG/DL (ref 70–99)
GLUCOSE BLD-MCNC: 140 MG/DL (ref 70–99)
GLUCOSE BLD-MCNC: 158 MG/DL (ref 70–99)
GLUCOSE BLD-MCNC: 179 MG/DL (ref 70–99)
MAGNESIUM: 2.2 MG/DL (ref 1.8–2.4)
PHOSPHORUS: 4.9 MG/DL (ref 2.5–4.9)

## 2023-09-09 PROCEDURE — 82962 GLUCOSE BLOOD TEST: CPT

## 2023-09-09 PROCEDURE — 84100 ASSAY OF PHOSPHORUS: CPT

## 2023-09-09 PROCEDURE — 83735 ASSAY OF MAGNESIUM: CPT

## 2023-09-09 PROCEDURE — 1200000000 HC SEMI PRIVATE

## 2023-09-09 PROCEDURE — 6370000000 HC RX 637 (ALT 250 FOR IP): Performed by: SURGERY

## 2023-09-09 PROCEDURE — 2580000003 HC RX 258: Performed by: SURGERY

## 2023-09-09 PROCEDURE — 2500000003 HC RX 250 WO HCPCS: Performed by: SURGERY

## 2023-09-09 PROCEDURE — 94761 N-INVAS EAR/PLS OXIMETRY MLT: CPT

## 2023-09-09 PROCEDURE — 36415 COLL VENOUS BLD VENIPUNCTURE: CPT

## 2023-09-09 PROCEDURE — 6360000002 HC RX W HCPCS: Performed by: SURGERY

## 2023-09-09 RX ADMIN — CALCITRIOL CAPSULES 0.25 MCG 0.5 MCG: 0.25 CAPSULE ORAL at 08:49

## 2023-09-09 RX ADMIN — SODIUM CHLORIDE, PRESERVATIVE FREE 10 ML: 5 INJECTION INTRAVENOUS at 22:53

## 2023-09-09 RX ADMIN — INSULIN LISPRO 3 UNITS: 100 INJECTION, SOLUTION INTRAVENOUS; SUBCUTANEOUS at 17:56

## 2023-09-09 RX ADMIN — CARVEDILOL 25 MG: 25 TABLET, FILM COATED ORAL at 17:57

## 2023-09-09 RX ADMIN — CALCIUM ACETATE 667 MG: 667 CAPSULE ORAL at 17:57

## 2023-09-09 RX ADMIN — ONDANSETRON 4 MG: 2 INJECTION INTRAMUSCULAR; INTRAVENOUS at 22:47

## 2023-09-09 RX ADMIN — INSULIN LISPRO 3 UNITS: 100 INJECTION, SOLUTION INTRAVENOUS; SUBCUTANEOUS at 12:16

## 2023-09-09 RX ADMIN — INSULIN LISPRO 3 UNITS: 100 INJECTION, SOLUTION INTRAVENOUS; SUBCUTANEOUS at 08:50

## 2023-09-09 RX ADMIN — SODIUM CHLORIDE, PRESERVATIVE FREE 10 ML: 5 INJECTION INTRAVENOUS at 08:50

## 2023-09-09 RX ADMIN — PANTOPRAZOLE SODIUM 40 MG: 40 TABLET, DELAYED RELEASE ORAL at 06:03

## 2023-09-09 RX ADMIN — AMLODIPINE BESYLATE 5 MG: 5 TABLET ORAL at 08:49

## 2023-09-09 RX ADMIN — CALCIUM ACETATE 667 MG: 667 CAPSULE ORAL at 12:16

## 2023-09-09 RX ADMIN — PANTOPRAZOLE SODIUM 40 MG: 40 TABLET, DELAYED RELEASE ORAL at 17:57

## 2023-09-09 RX ADMIN — CALCIUM ACETATE 667 MG: 667 CAPSULE ORAL at 08:49

## 2023-09-09 RX ADMIN — HYDRALAZINE HYDROCHLORIDE 25 MG: 25 TABLET, FILM COATED ORAL at 06:03

## 2023-09-09 RX ADMIN — HEPARIN SODIUM 5000 UNITS: 5000 INJECTION INTRAVENOUS; SUBCUTANEOUS at 06:03

## 2023-09-09 RX ADMIN — HYDRALAZINE HYDROCHLORIDE 25 MG: 25 TABLET, FILM COATED ORAL at 12:16

## 2023-09-09 RX ADMIN — OXYCODONE AND ACETAMINOPHEN 2 TABLET: 7.5; 325 TABLET ORAL at 12:21

## 2023-09-09 RX ADMIN — HEPARIN SODIUM 5000 UNITS: 5000 INJECTION INTRAVENOUS; SUBCUTANEOUS at 12:15

## 2023-09-09 RX ADMIN — OXYCODONE AND ACETAMINOPHEN 2 TABLET: 7.5; 325 TABLET ORAL at 20:00

## 2023-09-09 RX ADMIN — CARVEDILOL 25 MG: 25 TABLET, FILM COATED ORAL at 08:50

## 2023-09-09 ASSESSMENT — PAIN DESCRIPTION - DESCRIPTORS: DESCRIPTORS: ACHING;SHARP

## 2023-09-09 ASSESSMENT — PAIN SCALES - GENERAL: PAINLEVEL_OUTOF10: 7

## 2023-09-09 ASSESSMENT — PAIN DESCRIPTION - PAIN TYPE: TYPE: SURGICAL PAIN

## 2023-09-09 ASSESSMENT — PAIN DESCRIPTION - ONSET: ONSET: ON-GOING

## 2023-09-09 ASSESSMENT — PAIN DESCRIPTION - LOCATION: LOCATION: FOOT

## 2023-09-09 ASSESSMENT — PAIN DESCRIPTION - FREQUENCY: FREQUENCY: CONTINUOUS

## 2023-09-09 ASSESSMENT — PAIN - FUNCTIONAL ASSESSMENT: PAIN_FUNCTIONAL_ASSESSMENT: ACTIVITIES ARE NOT PREVENTED

## 2023-09-09 ASSESSMENT — PAIN DESCRIPTION - ORIENTATION: ORIENTATION: LEFT

## 2023-09-09 NOTE — PLAN OF CARE
Problem: Discharge Planning  Goal: Discharge to home or other facility with appropriate resources  Outcome: Progressing     Problem: Pain  Goal: Verbalizes/displays adequate comfort level or baseline comfort level  Outcome: Progressing  Flowsheets (Taken 9/8/2023 1245 by Tom Osei RN)  Verbalizes/displays adequate comfort level or baseline comfort level: Encourage patient to monitor pain and request assistance     Problem: Safety - Adult  Goal: Free from fall injury  Outcome: Progressing     Problem: ABCDS Injury Assessment  Goal: Absence of physical injury  Outcome: Progressing

## 2023-09-10 LAB
GLUCOSE BLD-MCNC: 125 MG/DL (ref 70–99)
GLUCOSE BLD-MCNC: 140 MG/DL (ref 70–99)
GLUCOSE BLD-MCNC: 159 MG/DL (ref 70–99)
GLUCOSE BLD-MCNC: 184 MG/DL (ref 70–99)
MAGNESIUM: 2.3 MG/DL (ref 1.8–2.4)
PHOSPHORUS: 5.7 MG/DL (ref 2.5–4.9)

## 2023-09-10 PROCEDURE — 6370000000 HC RX 637 (ALT 250 FOR IP): Performed by: SURGERY

## 2023-09-10 PROCEDURE — 2500000003 HC RX 250 WO HCPCS: Performed by: SURGERY

## 2023-09-10 PROCEDURE — 82962 GLUCOSE BLOOD TEST: CPT

## 2023-09-10 PROCEDURE — 2580000003 HC RX 258: Performed by: SURGERY

## 2023-09-10 PROCEDURE — 83735 ASSAY OF MAGNESIUM: CPT

## 2023-09-10 PROCEDURE — 84100 ASSAY OF PHOSPHORUS: CPT

## 2023-09-10 PROCEDURE — 2700000000 HC OXYGEN THERAPY PER DAY

## 2023-09-10 PROCEDURE — 94761 N-INVAS EAR/PLS OXIMETRY MLT: CPT

## 2023-09-10 PROCEDURE — 6360000002 HC RX W HCPCS: Performed by: SURGERY

## 2023-09-10 PROCEDURE — 1200000000 HC SEMI PRIVATE

## 2023-09-10 PROCEDURE — 36415 COLL VENOUS BLD VENIPUNCTURE: CPT

## 2023-09-10 RX ADMIN — HYDRALAZINE HYDROCHLORIDE 25 MG: 25 TABLET, FILM COATED ORAL at 06:49

## 2023-09-10 RX ADMIN — CARVEDILOL 25 MG: 25 TABLET, FILM COATED ORAL at 17:10

## 2023-09-10 RX ADMIN — OXYCODONE AND ACETAMINOPHEN 2 TABLET: 7.5; 325 TABLET ORAL at 09:47

## 2023-09-10 RX ADMIN — PANTOPRAZOLE SODIUM 40 MG: 40 TABLET, DELAYED RELEASE ORAL at 06:48

## 2023-09-10 RX ADMIN — CARVEDILOL 25 MG: 25 TABLET, FILM COATED ORAL at 09:48

## 2023-09-10 RX ADMIN — INSULIN LISPRO 3 UNITS: 100 INJECTION, SOLUTION INTRAVENOUS; SUBCUTANEOUS at 12:57

## 2023-09-10 RX ADMIN — CALCIUM ACETATE 667 MG: 667 CAPSULE ORAL at 09:48

## 2023-09-10 RX ADMIN — CALCIUM ACETATE 667 MG: 667 CAPSULE ORAL at 12:57

## 2023-09-10 RX ADMIN — HYDRALAZINE HYDROCHLORIDE 25 MG: 25 TABLET, FILM COATED ORAL at 21:55

## 2023-09-10 RX ADMIN — CALCITRIOL CAPSULES 0.25 MCG 0.5 MCG: 0.25 CAPSULE ORAL at 09:48

## 2023-09-10 RX ADMIN — HEPARIN SODIUM 5000 UNITS: 5000 INJECTION INTRAVENOUS; SUBCUTANEOUS at 06:48

## 2023-09-10 RX ADMIN — HYDRALAZINE HYDROCHLORIDE 25 MG: 25 TABLET, FILM COATED ORAL at 17:10

## 2023-09-10 RX ADMIN — HEPARIN SODIUM 5000 UNITS: 5000 INJECTION INTRAVENOUS; SUBCUTANEOUS at 21:54

## 2023-09-10 RX ADMIN — ATORVASTATIN CALCIUM 40 MG: 40 TABLET, FILM COATED ORAL at 21:54

## 2023-09-10 RX ADMIN — ARIPIPRAZOLE 5 MG: 10 TABLET ORAL at 21:54

## 2023-09-10 RX ADMIN — SODIUM CHLORIDE, PRESERVATIVE FREE 10 ML: 5 INJECTION INTRAVENOUS at 21:55

## 2023-09-10 RX ADMIN — INSULIN LISPRO 3 UNITS: 100 INJECTION, SOLUTION INTRAVENOUS; SUBCUTANEOUS at 17:21

## 2023-09-10 RX ADMIN — INSULIN LISPRO 3 UNITS: 100 INJECTION, SOLUTION INTRAVENOUS; SUBCUTANEOUS at 09:48

## 2023-09-10 RX ADMIN — SODIUM CHLORIDE, PRESERVATIVE FREE 10 ML: 5 INJECTION INTRAVENOUS at 09:49

## 2023-09-10 RX ADMIN — AMLODIPINE BESYLATE 5 MG: 5 TABLET ORAL at 09:48

## 2023-09-10 RX ADMIN — CALCIUM ACETATE 667 MG: 667 CAPSULE ORAL at 17:10

## 2023-09-10 RX ADMIN — HEPARIN SODIUM 5000 UNITS: 5000 INJECTION INTRAVENOUS; SUBCUTANEOUS at 15:00

## 2023-09-10 RX ADMIN — OXYCODONE AND ACETAMINOPHEN 2 TABLET: 7.5; 325 TABLET ORAL at 17:15

## 2023-09-10 RX ADMIN — PANTOPRAZOLE SODIUM 40 MG: 40 TABLET, DELAYED RELEASE ORAL at 17:10

## 2023-09-10 ASSESSMENT — PAIN DESCRIPTION - DESCRIPTORS
DESCRIPTORS: CRUSHING
DESCRIPTORS: SHARP;SHOOTING
DESCRIPTORS: STABBING
DESCRIPTORS: SHARP
DESCRIPTORS: SHARP
DESCRIPTORS: STABBING
DESCRIPTORS: STABBING

## 2023-09-10 ASSESSMENT — PAIN DESCRIPTION - FREQUENCY: FREQUENCY: INTERMITTENT

## 2023-09-10 ASSESSMENT — PAIN DESCRIPTION - ORIENTATION
ORIENTATION: LEFT

## 2023-09-10 ASSESSMENT — PAIN DESCRIPTION - LOCATION
LOCATION: FOOT

## 2023-09-10 ASSESSMENT — PAIN SCALES - GENERAL
PAINLEVEL_OUTOF10: 7
PAINLEVEL_OUTOF10: 6
PAINLEVEL_OUTOF10: 8
PAINLEVEL_OUTOF10: 6
PAINLEVEL_OUTOF10: 8
PAINLEVEL_OUTOF10: 7

## 2023-09-10 ASSESSMENT — PAIN SCALES - WONG BAKER
WONGBAKER_NUMERICALRESPONSE: 6
WONGBAKER_NUMERICALRESPONSE: 6
WONGBAKER_NUMERICALRESPONSE: 2

## 2023-09-10 ASSESSMENT — PAIN - FUNCTIONAL ASSESSMENT
PAIN_FUNCTIONAL_ASSESSMENT: ACTIVITIES ARE NOT PREVENTED
PAIN_FUNCTIONAL_ASSESSMENT: PREVENTS OR INTERFERES SOME ACTIVE ACTIVITIES AND ADLS
PAIN_FUNCTIONAL_ASSESSMENT: ACTIVITIES ARE NOT PREVENTED

## 2023-09-10 ASSESSMENT — PAIN DESCRIPTION - PAIN TYPE: TYPE: SURGICAL PAIN

## 2023-09-10 ASSESSMENT — PAIN DESCRIPTION - ONSET: ONSET: ON-GOING

## 2023-09-11 LAB
GLUCOSE BLD-MCNC: 124 MG/DL (ref 70–99)
GLUCOSE BLD-MCNC: 124 MG/DL (ref 70–99)
GLUCOSE BLD-MCNC: 174 MG/DL (ref 70–99)
GLUCOSE BLD-MCNC: 178 MG/DL (ref 70–99)

## 2023-09-11 PROCEDURE — 97606 NEG PRS WND THER DME>50 SQCM: CPT

## 2023-09-11 PROCEDURE — 2580000003 HC RX 258: Performed by: SURGERY

## 2023-09-11 PROCEDURE — 6360000002 HC RX W HCPCS: Performed by: SURGERY

## 2023-09-11 PROCEDURE — 2500000003 HC RX 250 WO HCPCS: Performed by: SURGERY

## 2023-09-11 PROCEDURE — 97605 NEG PRS WND THER DME<=50SQCM: CPT

## 2023-09-11 PROCEDURE — 90935 HEMODIALYSIS ONE EVALUATION: CPT

## 2023-09-11 PROCEDURE — 6370000000 HC RX 637 (ALT 250 FOR IP): Performed by: SURGERY

## 2023-09-11 PROCEDURE — 1200000000 HC SEMI PRIVATE

## 2023-09-11 PROCEDURE — 6360000002 HC RX W HCPCS: Performed by: INTERNAL MEDICINE

## 2023-09-11 PROCEDURE — 99233 SBSQ HOSP IP/OBS HIGH 50: CPT | Performed by: NURSE PRACTITIONER

## 2023-09-11 PROCEDURE — 94761 N-INVAS EAR/PLS OXIMETRY MLT: CPT

## 2023-09-11 PROCEDURE — 82962 GLUCOSE BLOOD TEST: CPT

## 2023-09-11 RX ADMIN — SODIUM CHLORIDE, PRESERVATIVE FREE 10 ML: 5 INJECTION INTRAVENOUS at 22:00

## 2023-09-11 RX ADMIN — HEPARIN SODIUM 5000 UNITS: 5000 INJECTION INTRAVENOUS; SUBCUTANEOUS at 14:37

## 2023-09-11 RX ADMIN — OXYCODONE AND ACETAMINOPHEN 2 TABLET: 7.5; 325 TABLET ORAL at 21:59

## 2023-09-11 RX ADMIN — HYDRALAZINE HYDROCHLORIDE 25 MG: 25 TABLET, FILM COATED ORAL at 14:33

## 2023-09-11 RX ADMIN — HYDRALAZINE HYDROCHLORIDE 25 MG: 25 TABLET, FILM COATED ORAL at 06:28

## 2023-09-11 RX ADMIN — SODIUM CHLORIDE: 9 INJECTION, SOLUTION INTRAVENOUS at 14:50

## 2023-09-11 RX ADMIN — HEPARIN SODIUM 5000 UNITS: 5000 INJECTION INTRAVENOUS; SUBCUTANEOUS at 06:28

## 2023-09-11 RX ADMIN — PANTOPRAZOLE SODIUM 40 MG: 40 TABLET, DELAYED RELEASE ORAL at 06:29

## 2023-09-11 RX ADMIN — EPOETIN ALFA-EPBX 10000 UNITS: 10000 INJECTION, SOLUTION INTRAVENOUS; SUBCUTANEOUS at 12:05

## 2023-09-11 RX ADMIN — CEFEPIME 2000 MG: 2 INJECTION, POWDER, FOR SOLUTION INTRAVENOUS at 14:51

## 2023-09-11 RX ADMIN — ATORVASTATIN CALCIUM 40 MG: 40 TABLET, FILM COATED ORAL at 21:59

## 2023-09-11 RX ADMIN — HYDRALAZINE HYDROCHLORIDE 25 MG: 25 TABLET, FILM COATED ORAL at 21:59

## 2023-09-11 RX ADMIN — OXYCODONE AND ACETAMINOPHEN 1 TABLET: 7.5; 325 TABLET ORAL at 18:01

## 2023-09-11 RX ADMIN — OXYCODONE AND ACETAMINOPHEN 2 TABLET: 7.5; 325 TABLET ORAL at 06:34

## 2023-09-11 RX ADMIN — ARIPIPRAZOLE 5 MG: 10 TABLET ORAL at 21:59

## 2023-09-11 RX ADMIN — PANTOPRAZOLE SODIUM 40 MG: 40 TABLET, DELAYED RELEASE ORAL at 17:55

## 2023-09-11 RX ADMIN — INSULIN GLARGINE 10 UNITS: 100 INJECTION, SOLUTION SUBCUTANEOUS at 22:06

## 2023-09-11 RX ADMIN — CALCIUM ACETATE 667 MG: 667 CAPSULE ORAL at 17:54

## 2023-09-11 RX ADMIN — CARVEDILOL 25 MG: 25 TABLET, FILM COATED ORAL at 17:54

## 2023-09-11 RX ADMIN — HEPARIN SODIUM 5000 UNITS: 5000 INJECTION INTRAVENOUS; SUBCUTANEOUS at 21:59

## 2023-09-11 RX ADMIN — CALCIUM ACETATE 667 MG: 667 CAPSULE ORAL at 14:33

## 2023-09-11 RX ADMIN — HYDROMORPHONE HYDROCHLORIDE 0.5 MG: 1 INJECTION, SOLUTION INTRAMUSCULAR; INTRAVENOUS; SUBCUTANEOUS at 13:16

## 2023-09-11 ASSESSMENT — PAIN - FUNCTIONAL ASSESSMENT
PAIN_FUNCTIONAL_ASSESSMENT: ACTIVITIES ARE NOT PREVENTED

## 2023-09-11 ASSESSMENT — PAIN DESCRIPTION - PAIN TYPE: TYPE: SURGICAL PAIN

## 2023-09-11 ASSESSMENT — PAIN DESCRIPTION - DESCRIPTORS
DESCRIPTORS: CRAMPING
DESCRIPTORS: STABBING
DESCRIPTORS: STABBING
DESCRIPTORS: SHARP

## 2023-09-11 ASSESSMENT — PAIN SCALES - WONG BAKER: WONGBAKER_NUMERICALRESPONSE: 2

## 2023-09-11 ASSESSMENT — PAIN DESCRIPTION - LOCATION
LOCATION: FOOT

## 2023-09-11 ASSESSMENT — PAIN DESCRIPTION - ORIENTATION
ORIENTATION: LEFT
ORIENTATION: RIGHT

## 2023-09-11 ASSESSMENT — PAIN DESCRIPTION - FREQUENCY: FREQUENCY: INTERMITTENT

## 2023-09-11 ASSESSMENT — PAIN DESCRIPTION - ONSET: ONSET: ON-GOING

## 2023-09-11 ASSESSMENT — PAIN SCALES - GENERAL
PAINLEVEL_OUTOF10: 7
PAINLEVEL_OUTOF10: 5
PAINLEVEL_OUTOF10: 7

## 2023-09-11 NOTE — PLAN OF CARE
Problem: Discharge Planning  Goal: Discharge to home or other facility with appropriate resources  Outcome: Adequate for Discharge     Problem: Pain  Goal: Verbalizes/displays adequate comfort level or baseline comfort level  Outcome: Adequate for Discharge     Problem: Safety - Adult  Goal: Free from fall injury  Outcome: Adequate for Discharge     Problem: ABCDS Injury Assessment  Goal: Absence of physical injury  Outcome: Adequate for Discharge     Problem: Chronic Conditions and Co-morbidities  Goal: Patient's chronic conditions and co-morbidity symptoms are monitored and maintained or improved  Outcome: Adequate for Discharge     Problem: Skin/Tissue Integrity  Goal: Absence of new skin breakdown  Description: 1. Monitor for areas of redness and/or skin breakdown  2. Assess vascular access sites hourly  3. Every 4-6 hours minimum:  Change oxygen saturation probe site  4. Every 4-6 hours:  If on nasal continuous positive airway pressure, respiratory therapy assess nares and determine need for appliance change or resting period.   Outcome: Adequate for Discharge     Problem: Nutrition Deficit:  Goal: Optimize nutritional status  9/11/2023 1510 by Jessica Gan RN  Outcome: Adequate for Discharge  9/11/2023 1212 by Constantino Riley RD, LD  Flowsheets (Taken 9/11/2023 1212)  Nutrient intake appropriate for improving, restoring, or maintaining nutritional needs:   Monitor oral intake, labs, and treatment plans   Assess nutritional status and recommend course of action

## 2023-09-11 NOTE — CARE COORDINATION
Chart reviewed. Plan is for wound vac to be applied today. Wound vac will need to be ordered and authorized by insurance. This will take approx 24-48 hours. Patient in HD at this time.

## 2023-09-11 NOTE — CARE COORDINATION
Wound vac applied per wound care. I will send order to [facility] once wound notes and measurements are in. [facility] needs these to send auth.

## 2023-09-12 VITALS
BODY MASS INDEX: 36.98 KG/M2 | SYSTOLIC BLOOD PRESSURE: 128 MMHG | RESPIRATION RATE: 16 BRPM | HEART RATE: 82 BPM | DIASTOLIC BLOOD PRESSURE: 66 MMHG | WEIGHT: 249.7 LBS | HEIGHT: 69 IN | TEMPERATURE: 97.8 F | OXYGEN SATURATION: 97 %

## 2023-09-12 LAB
ANION GAP SERPL CALCULATED.3IONS-SCNC: 14 MMOL/L (ref 4–16)
BASOPHILS ABSOLUTE: 0 K/CU MM
BASOPHILS RELATIVE PERCENT: 0.4 % (ref 0–1)
BUN SERPL-MCNC: 48 MG/DL (ref 6–23)
CALCIUM SERPL-MCNC: 8 MG/DL (ref 8.3–10.6)
CHLORIDE BLD-SCNC: 97 MMOL/L (ref 99–110)
CO2: 23 MMOL/L (ref 21–32)
CREAT SERPL-MCNC: 6.8 MG/DL (ref 0.9–1.3)
CULTURE: ABNORMAL
CULTURE: ABNORMAL
DIFFERENTIAL TYPE: ABNORMAL
EOSINOPHILS ABSOLUTE: 0.2 K/CU MM
EOSINOPHILS RELATIVE PERCENT: 1.9 % (ref 0–3)
GFR SERPL CREATININE-BSD FRML MDRD: 9 ML/MIN/1.73M2
GLUCOSE BLD-MCNC: 162 MG/DL (ref 70–99)
GLUCOSE BLD-MCNC: 177 MG/DL (ref 70–99)
GLUCOSE SERPL-MCNC: 178 MG/DL (ref 70–99)
HCT VFR BLD CALC: 24.8 % (ref 42–52)
HEMOGLOBIN: 7.5 GM/DL (ref 13.5–18)
IMMATURE NEUTROPHIL %: 1.1 % (ref 0–0.43)
LYMPHOCYTES ABSOLUTE: 0.8 K/CU MM
LYMPHOCYTES RELATIVE PERCENT: 8.3 % (ref 24–44)
Lab: ABNORMAL
MCH RBC QN AUTO: 29.2 PG (ref 27–31)
MCHC RBC AUTO-ENTMCNC: 30.2 % (ref 32–36)
MCV RBC AUTO: 96.5 FL (ref 78–100)
MONOCYTES ABSOLUTE: 0.8 K/CU MM
MONOCYTES RELATIVE PERCENT: 7.8 % (ref 0–4)
NUCLEATED RBC %: 0 %
PDW BLD-RTO: 16.4 % (ref 11.7–14.9)
PLATELET # BLD: 246 K/CU MM (ref 140–440)
PMV BLD AUTO: 9.6 FL (ref 7.5–11.1)
POTASSIUM SERPL-SCNC: 5.7 MMOL/L (ref 3.5–5.1)
POTASSIUM SERPL-SCNC: 5.8 MMOL/L (ref 3.5–5.1)
RBC # BLD: 2.57 M/CU MM (ref 4.6–6.2)
SEGMENTED NEUTROPHILS ABSOLUTE COUNT: 7.8 K/CU MM
SEGMENTED NEUTROPHILS RELATIVE PERCENT: 80.5 % (ref 36–66)
SODIUM BLD-SCNC: 134 MMOL/L (ref 135–145)
SPECIMEN: ABNORMAL
TOTAL IMMATURE NEUTOROPHIL: 0.11 K/CU MM
TOTAL NUCLEATED RBC: 0 K/CU MM
WBC # BLD: 9.7 K/CU MM (ref 4–10.5)

## 2023-09-12 PROCEDURE — 90935 HEMODIALYSIS ONE EVALUATION: CPT

## 2023-09-12 PROCEDURE — 85025 COMPLETE CBC W/AUTO DIFF WBC: CPT

## 2023-09-12 PROCEDURE — 36415 COLL VENOUS BLD VENIPUNCTURE: CPT

## 2023-09-12 PROCEDURE — 80048 BASIC METABOLIC PNL TOTAL CA: CPT

## 2023-09-12 PROCEDURE — 2580000003 HC RX 258: Performed by: SURGERY

## 2023-09-12 PROCEDURE — 6370000000 HC RX 637 (ALT 250 FOR IP): Performed by: SURGERY

## 2023-09-12 PROCEDURE — 2700000000 HC OXYGEN THERAPY PER DAY

## 2023-09-12 PROCEDURE — 82962 GLUCOSE BLOOD TEST: CPT

## 2023-09-12 PROCEDURE — 99232 SBSQ HOSP IP/OBS MODERATE 35: CPT | Performed by: NURSE PRACTITIONER

## 2023-09-12 PROCEDURE — 94761 N-INVAS EAR/PLS OXIMETRY MLT: CPT

## 2023-09-12 PROCEDURE — 6370000000 HC RX 637 (ALT 250 FOR IP): Performed by: INTERNAL MEDICINE

## 2023-09-12 PROCEDURE — 84132 ASSAY OF SERUM POTASSIUM: CPT

## 2023-09-12 PROCEDURE — 2500000003 HC RX 250 WO HCPCS: Performed by: SURGERY

## 2023-09-12 PROCEDURE — 6360000002 HC RX W HCPCS: Performed by: SURGERY

## 2023-09-12 RX ADMIN — PANTOPRAZOLE SODIUM 40 MG: 40 TABLET, DELAYED RELEASE ORAL at 18:03

## 2023-09-12 RX ADMIN — INSULIN LISPRO 3 UNITS: 100 INJECTION, SOLUTION INTRAVENOUS; SUBCUTANEOUS at 09:21

## 2023-09-12 RX ADMIN — SODIUM ZIRCONIUM CYCLOSILICATE 10 G: 10 POWDER, FOR SUSPENSION ORAL at 13:42

## 2023-09-12 RX ADMIN — AMLODIPINE BESYLATE 5 MG: 5 TABLET ORAL at 08:05

## 2023-09-12 RX ADMIN — CALCIUM ACETATE 667 MG: 667 CAPSULE ORAL at 08:05

## 2023-09-12 RX ADMIN — CALCIUM ACETATE 667 MG: 667 CAPSULE ORAL at 18:01

## 2023-09-12 RX ADMIN — CARVEDILOL 25 MG: 25 TABLET, FILM COATED ORAL at 08:04

## 2023-09-12 RX ADMIN — HEPARIN SODIUM 5000 UNITS: 5000 INJECTION INTRAVENOUS; SUBCUTANEOUS at 05:57

## 2023-09-12 RX ADMIN — SODIUM CHLORIDE, PRESERVATIVE FREE 10 ML: 5 INJECTION INTRAVENOUS at 08:08

## 2023-09-12 RX ADMIN — CALCITRIOL CAPSULES 0.25 MCG 0.5 MCG: 0.25 CAPSULE ORAL at 08:04

## 2023-09-12 RX ADMIN — PANTOPRAZOLE SODIUM 40 MG: 40 TABLET, DELAYED RELEASE ORAL at 05:57

## 2023-09-12 RX ADMIN — OXYCODONE AND ACETAMINOPHEN 2 TABLET: 7.5; 325 TABLET ORAL at 05:58

## 2023-09-12 RX ADMIN — INSULIN LISPRO 3 UNITS: 100 INJECTION, SOLUTION INTRAVENOUS; SUBCUTANEOUS at 11:48

## 2023-09-12 RX ADMIN — OXYCODONE AND ACETAMINOPHEN 1 TABLET: 5; 325 TABLET ORAL at 13:48

## 2023-09-12 RX ADMIN — CARVEDILOL 25 MG: 25 TABLET, FILM COATED ORAL at 18:01

## 2023-09-12 RX ADMIN — HYDRALAZINE HYDROCHLORIDE 25 MG: 25 TABLET, FILM COATED ORAL at 13:42

## 2023-09-12 RX ADMIN — HYDRALAZINE HYDROCHLORIDE 25 MG: 25 TABLET, FILM COATED ORAL at 05:57

## 2023-09-12 RX ADMIN — CALCIUM ACETATE 667 MG: 667 CAPSULE ORAL at 11:48

## 2023-09-12 RX ADMIN — OXYCODONE AND ACETAMINOPHEN 2 TABLET: 7.5; 325 TABLET ORAL at 18:03

## 2023-09-12 RX ADMIN — HEPARIN SODIUM 5000 UNITS: 5000 INJECTION INTRAVENOUS; SUBCUTANEOUS at 13:42

## 2023-09-12 ASSESSMENT — PAIN DESCRIPTION - DESCRIPTORS
DESCRIPTORS: STABBING
DESCRIPTORS: STABBING
DESCRIPTORS: ACHING;SORE
DESCRIPTORS: THROBBING;SHOOTING;STABBING
DESCRIPTORS: THROBBING;STABBING

## 2023-09-12 ASSESSMENT — PAIN DESCRIPTION - ORIENTATION
ORIENTATION: LEFT

## 2023-09-12 ASSESSMENT — PAIN DESCRIPTION - LOCATION
LOCATION: FOOT

## 2023-09-12 ASSESSMENT — PAIN SCALES - GENERAL
PAINLEVEL_OUTOF10: 0
PAINLEVEL_OUTOF10: 2
PAINLEVEL_OUTOF10: 7
PAINLEVEL_OUTOF10: 7
PAINLEVEL_OUTOF10: 1
PAINLEVEL_OUTOF10: 1
PAINLEVEL_OUTOF10: 7

## 2023-09-12 NOTE — DISCHARGE SUMMARY
V2.0  Discharge Summary    Name:  Jake Nicole /Age/Sex: 1975 (50 y.o. male)   Admit Date: 2023  Discharge Date: 23    MRN & CSN:  [id number] & [id number] Encounter Date and Time 23 3:01 PM EDT    Attending:  Liliana Castle MD Discharging Provider: Liliana Castle MD       Hospital Course:     Brief HPI: Jake Nicole is a 50 y.o. male who presented with diabetic foot infection    Brief Problem Based Course:     Diabetic foot infection of LLE, no OM status post I&D and debridement on 2023; s/p transmetatarsal amputation on 23  Sepsis present on admission -2/2 above  Patient had a temp of 101.2, , WBC 15.3. ESR 54, .5. Influenza a negative, rapid COVID-negative. Chest x-ray-no acute process. X-ray of the foot-soft tissue ulcer/fistulous track with soft tissue swelling about the plantar aspect of the great toe. Blood cultures negative. -S/p I&D then transmetatarsal amputation as infection appeared to be worsening  -surgical pathology revealed clear margin with no residual OM - ID team recommend 6 weeks abx as he has a fistulous tract from imaging  -Continue cefepime after dialysis (2g M/W and 3g F, end date 10/13/23), discussed with nephrology team who verified the availability of cefepime at the dialysis center  -wound not ready for secondary closure and unlikely to be so in the next 1-2 weeks - wound VAC placed and home vac approved  -PT/OT - no weightbearing  -F/u with ID in 2 weeks with weekly lab draws  -F/u with general surgery and nephrology     Acute on chronic anemia  Hemoglobin dropped given 1 unit PRBC during his admission. GI consulted. Endoscopy with cratered gastric ulcer with no stigmata of bleeding. H. pylori biopsy pending. Plan for Protonix twice daily for 8 weeks.      ESRD on HD, MWF  Mild hyperkalemia  -Underwent dialysis per schedule and an extra 2 hour session to address hyperkalemia prior to discharge  -Discussed with nephrology, pt to
known as: BACTRIM;SEPTRA               Where to Get Your Medications        These medications were sent to [facility], [address]  [address] [facility], [address] [place] [address]      Phone: [phone]   insulin lispro (1 Unit Dial) 100 UNIT/ML Sopn  Lantus SoloStar 100 UNIT/ML injection pen  pantoprazole 40 MG tablet       Information about where to get these medications is not yet available    Ask your nurse or doctor about these medications  cefepime  infusion         Objective Findings at Discharge:       BMP/CBC  Recent Labs     09/03/23  0125 09/04/23  0201 09/05/23  0258   * 134* 134*   K 4.7 4.7 4.6   CL 94* 96* 97*   CO2 23 21 25   BUN 51* 59* 37*   CREATININE 8.1* 9.8* 7.0*   WBC 12.2*  --  12.1*   HCT 26.0*  --  26.9*     --  270       IMAGING:  -    Additional Information: Patient seen and examined day of discharge.  For more information regarding patient's care please contact [address] [place] [address] [facility] [phone]    Discharge Time of 40 minutes    Electronically signed by Terri Correa MD on 9/5/2023 at 1:05 PM

## 2023-09-12 NOTE — CARE COORDINATION
Wound vac has been approved. Dr. Elidia Barriga made aware. Waiting to hear back from Dr. Boston Latham on potassium levels. [facility] notified of potential DC today. Plan is for pt to return home with [facility] and receive IVAB at dialysis. Wound vac ordered in case patient is discharged tonight. Serial number [id number].     1540 - Wound vac delivered to patient. POD signed, stickered and faxed back to [facility]. Original hard copy placed in chart. Charge made aware.

## 2023-09-23 ENCOUNTER — HOSPITAL ENCOUNTER (EMERGENCY)
Age: 48
Discharge: HOME | End: 2023-09-23
Attending: STUDENT IN AN ORGANIZED HEALTH CARE EDUCATION/TRAINING PROGRAM
Payer: MEDICARE

## 2023-09-23 VITALS
DIASTOLIC BLOOD PRESSURE: 83 MMHG | WEIGHT: 247 LBS | OXYGEN SATURATION: 100 % | TEMPERATURE: 98.7 F | BODY MASS INDEX: 36.58 KG/M2 | SYSTOLIC BLOOD PRESSURE: 158 MMHG | HEART RATE: 85 BPM | RESPIRATION RATE: 17 BRPM | HEIGHT: 69 IN

## 2023-09-23 DIAGNOSIS — I97.89 NECROSIS OF SURGICAL WOUND (HCC): ICD-10-CM

## 2023-09-23 DIAGNOSIS — I96 NECROSIS OF SURGICAL WOUND (HCC): ICD-10-CM

## 2023-09-23 DIAGNOSIS — T14.8XXA OPEN WOUND: Primary | ICD-10-CM

## 2023-09-23 LAB
ALBUMIN SERPL-MCNC: 4.2 GM/DL (ref 3.4–5)
ALP BLD-CCNC: 76 IU/L (ref 40–129)
ALT SERPL-CCNC: 11 U/L (ref 10–40)
ANION GAP SERPL CALCULATED.3IONS-SCNC: 15 MMOL/L (ref 4–16)
AST SERPL-CCNC: 14 IU/L (ref 15–37)
BASOPHILS ABSOLUTE: 0.1 K/CU MM
BASOPHILS RELATIVE PERCENT: 1 % (ref 0–1)
BILIRUB SERPL-MCNC: 0.4 MG/DL (ref 0–1)
BUN SERPL-MCNC: 45 MG/DL (ref 6–23)
CALCIUM SERPL-MCNC: 7.7 MG/DL (ref 8.3–10.6)
CHLORIDE BLD-SCNC: 96 MMOL/L (ref 99–110)
CO2: 27 MMOL/L (ref 21–32)
CREAT SERPL-MCNC: 7 MG/DL (ref 0.9–1.3)
DIFFERENTIAL TYPE: ABNORMAL
EOSINOPHILS ABSOLUTE: 0.4 K/CU MM
EOSINOPHILS RELATIVE PERCENT: 5.3 % (ref 0–3)
GFR SERPL CREATININE-BSD FRML MDRD: 9 ML/MIN/1.73M2
GLUCOSE SERPL-MCNC: 140 MG/DL (ref 70–99)
HCT VFR BLD CALC: 30.6 % (ref 42–52)
HEMOGLOBIN: 9.3 GM/DL (ref 13.5–18)
IMMATURE NEUTROPHIL %: 0.4 % (ref 0–0.43)
LYMPHOCYTES ABSOLUTE: 1.1 K/CU MM
LYMPHOCYTES RELATIVE PERCENT: 13.5 % (ref 24–44)
MCH RBC QN AUTO: 29.4 PG (ref 27–31)
MCHC RBC AUTO-ENTMCNC: 30.4 % (ref 32–36)
MCV RBC AUTO: 96.8 FL (ref 78–100)
MONOCYTES ABSOLUTE: 0.6 K/CU MM
MONOCYTES RELATIVE PERCENT: 7.4 % (ref 0–4)
NUCLEATED RBC %: 0 %
PDW BLD-RTO: 16.8 % (ref 11.7–14.9)
PLATELET # BLD: 251 K/CU MM (ref 140–440)
PMV BLD AUTO: 9.6 FL (ref 7.5–11.1)
POTASSIUM SERPL-SCNC: 4.9 MMOL/L (ref 3.5–5.1)
RBC # BLD: 3.16 M/CU MM (ref 4.6–6.2)
SEGMENTED NEUTROPHILS ABSOLUTE COUNT: 5.6 K/CU MM
SEGMENTED NEUTROPHILS RELATIVE PERCENT: 72.4 % (ref 36–66)
SODIUM BLD-SCNC: 138 MMOL/L (ref 135–145)
TOTAL IMMATURE NEUTOROPHIL: 0.03 K/CU MM
TOTAL NUCLEATED RBC: 0 K/CU MM
TOTAL PROTEIN: 8 GM/DL (ref 6.4–8.2)
WBC # BLD: 7.8 K/CU MM (ref 4–10.5)

## 2023-09-23 PROCEDURE — 85025 COMPLETE CBC W/AUTO DIFF WBC: CPT

## 2023-09-23 PROCEDURE — 6370000000 HC RX 637 (ALT 250 FOR IP): Performed by: NURSE PRACTITIONER

## 2023-09-23 PROCEDURE — 87075 CULTR BACTERIA EXCEPT BLOOD: CPT

## 2023-09-23 PROCEDURE — 99283 EMERGENCY DEPT VISIT LOW MDM: CPT

## 2023-09-23 PROCEDURE — 87040 BLOOD CULTURE FOR BACTERIA: CPT

## 2023-09-23 PROCEDURE — 80053 COMPREHEN METABOLIC PANEL: CPT

## 2023-09-23 PROCEDURE — 87070 CULTURE OTHR SPECIMN AEROBIC: CPT

## 2023-09-23 RX ORDER — CLINDAMYCIN HYDROCHLORIDE 150 MG/1
450 CAPSULE ORAL ONCE
Status: COMPLETED | OUTPATIENT
Start: 2023-09-23 | End: 2023-09-23

## 2023-09-23 RX ORDER — CLINDAMYCIN HYDROCHLORIDE 150 MG/1
450 CAPSULE ORAL 3 TIMES DAILY
Qty: 90 CAPSULE | Refills: 0 | Status: SHIPPED | OUTPATIENT
Start: 2023-09-23 | End: 2023-10-03

## 2023-09-23 RX ADMIN — CLINDAMYCIN HYDROCHLORIDE 450 MG: 150 CAPSULE ORAL at 19:39

## 2023-09-23 ASSESSMENT — ENCOUNTER SYMPTOMS
COUGH: 0
ABDOMINAL PAIN: 0
SHORTNESS OF BREATH: 0
COLOR CHANGE: 0
CHEST TIGHTNESS: 0

## 2023-09-23 ASSESSMENT — PAIN DESCRIPTION - ORIENTATION: ORIENTATION: LEFT

## 2023-09-23 ASSESSMENT — PAIN SCALES - GENERAL: PAINLEVEL_OUTOF10: 7

## 2023-09-23 ASSESSMENT — PAIN DESCRIPTION - LOCATION: LOCATION: TOE (COMMENT WHICH ONE)

## 2023-09-23 ASSESSMENT — PAIN DESCRIPTION - PAIN TYPE: TYPE: ACUTE PAIN

## 2023-09-23 ASSESSMENT — PAIN DESCRIPTION - FREQUENCY: FREQUENCY: CONTINUOUS

## 2023-09-23 ASSESSMENT — PAIN DESCRIPTION - DESCRIPTORS: DESCRIPTORS: SHARP

## 2023-09-23 NOTE — ED PROVIDER NOTES
[address] [facility] ENCOUNTER      Pt Name: Ana Pizano  MRN: [id number]  [address] [facility] 1975  Date of evaluation: 9/23/2023  Provider: APPLE Gann - QIAN  PCP: Paty Morrissey MD  Note Started: 4:45 PM EDT 9/23/23    I am the Primary Clinician of Record. I have seen and evaluated this patient with my supervising physician Rubi Fonseca MD.  [address]       Chief Complaint   Patient presents with    Wound Check     wound check on recent Left Great Toe amputation       HISTORY OF PRESENT ILLNESS: 1 or more Elements   Ana Pizano is a 50 y.o. male with a history of end-stage renal disease on hemodialysis, chronic anemia, diabetes mellitus, hypertension, hyperlipidemia who presents to the ER for postoperative wound check after transmetatarsal amputation left great toe. The patient has had debridement in office by Dr. Fidelina Tran four days ago. Home health saw the patient yesterday and noted necrotic sloughing tissue. Pt was advised per Dr. Marx Back office to go to the ER yesterday, The patient had dialysis and upon return home from dialysis did not have transportation to the hospital, so he is here today. No fevers, no nausea, no vomiting. Patient is receiving cefepime after dialysis  M/W/F    I have reviewed the nursing triage documentation and agree unless otherwise noted. REVIEW OF SYSTEMS :    Review of Systems   Constitutional:  Negative for chills, fatigue and fever. HENT:  Negative for congestion. Respiratory:  Negative for cough, chest tightness and shortness of breath. Cardiovascular:  Negative for chest pain and leg swelling. Gastrointestinal:  Negative for abdominal pain. Genitourinary:  Negative for difficulty urinating and dysuria. Musculoskeletal:  Negative for myalgias. Skin:  Positive for wound (left foot). Negative for color change and rash.    Neurological:  Negative for mouth 2 times daily (before meals), Disp-60 tablet, R-1Normal      glucose 4 g chewable tablet Take 4 tablets by mouth as needed for Low blood sugar, Disp-60 tablet, R-3Normal      hydrALAZINE (APRESOLINE) 25 MG tablet Take 1 tablet by mouth every 8 hours, Disp-90 tablet, R-3Normal      amLODIPine (NORVASC) 5 MG tablet Take 1 tablet by mouth daily, Disp-30 tablet, R-3Normal      calcium acetate (PHOSLO) 667 MG CAPS capsule Take 1 capsule by mouth 3 times daily (with meals), Disp-180 capsule, R-0Normal      famotidine (PEPCID) 20 MG tablet Take 1 tablet by mouth daily, Disp-60 tablet, R-3Normal      atorvastatin (LIPITOR) 40 MG tablet Take 1 tablet by mouth nightly, Disp-30 tablet, R-3NO PRINT      calcitRIOL (ROCALTROL) 0.5 MCG capsule Take 1 capsule by mouth daily, Disp-14 capsule, R-3Normal      carvedilol (COREG) 25 MG tablet Take 1 tablet by mouth 2 times daily (with meals)Historical Med      calcium carbonate (TUMS) 500 MG chewable tablet Take 2 tablets by mouth 3 times daily (with meals)Historical Med      oxyCODONE-acetaminophen (PERCOCET) 7.5-325 MG per tablet Take 1 tablet by mouth every 8 hours as needed. Historical Med      ARIPiprazole (ABILIFY) 5 MG tablet Take 1 tablet by mouth at bedtime, Disp-30 tablet, R-0Normal      Lancets MISC DAILY Starting Fri 9/21/2018, Disp-100 each, R-3, Print      glucose monitoring kit (FREESTYLE) monitoring kit 1 each, Does not apply, ONCE Starting 6/20/2013, Disp-1 kit, R-0, Normal             ALLERGIES     Adhesive tape, Doxycycline, and Reglan [metoclopramide]    FAMILYHISTORY       Family History   Problem Relation Age of Onset    Cancer Mother         ?site    Stroke Mother     Bleeding Prob Mother     Diabetes Father     Heart Disease Father     High Blood Pressure Father     Obesity Father     Kidney Disease Father     Diabetes Sister     High Blood Pressure Sister     Mental Illness Sister     Obesity Sister     High Blood Pressure Other     Mental Illness Other

## 2023-09-23 NOTE — ED PROVIDER NOTES
I independently examined and evaluated Kar Daniels. In brief, 51-year-old male on ESRD with chronic wound to left foot. Sees wound care and general surgery regarding his wound. Came in for wound check. Had a debridement in the office about 4 days ago. Was supposed to go to doctor's office yesterday but was getting dialysis and was not able to make it. Denies any fevers. Denies any significant change in pain. Says that the wound has not really had much drainage. Says that he does have a new black spot on it but otherwise says that it looks better than it has in the past.  Says he gets cefepime during his dialysis sessions. Focused exam revealed     Chronic wound to lower extremity. Pulse intact. Sensation intact. There is an area of necrosis that does not appear to have overlying infection. Dry gangrene. No significant purulence able to be expressed. No crepitus. CC/HPI Summary, DDx, ED Course, and Reassessment:       General surgery consulted agreeable for outpatient follow-up with reassuring WBC. No leukocytosis. There does appear to be an area of dry gangrene but without overlying infection concerning for wet gangrene. No crepitus and given how open the wound is, I do not feel that imaging would be significantly helpful in this case. Neurovascularly intact. Discussed with patient, through decision making he was agreeable for discharge with antibiotics and following up with general surgery. We did offer admission but he was agreeable for discharge. Discharged with clindamycin. Strict return precautions discussed. Previous records reviewed:  Patient was seen 9/19/2023 for general surgery office regarding postop visit . Records Reviewed :  Outpatient Notes    Chronic conditions affecting care: ESRD, diabetes, chronic wound  Disposition Considerations (tests considered but not done, Shared Decision Making, Pt Expectation of Test or Tx.):   Through shared decision making,

## 2023-09-23 NOTE — DISCHARGE INSTRUCTIONS
It was a pleasure to treat you today. Please take antibiotics as prescribed in addition to your dialysis antibiotics. Return to the emergency department for worsening signs or symptoms, including drainage, controlled pain, fevers nausea or vomiting. Call Dr. Christopher Garcia office first thing Monday morning for follow-up.

## 2023-09-24 LAB
CULTURE: NORMAL
CULTURE: NORMAL
Lab: NORMAL
Lab: NORMAL
SPECIMEN: NORMAL
SPECIMEN: NORMAL

## 2023-09-28 LAB
CULTURE: NORMAL
CULTURE: NORMAL
Lab: NORMAL
Lab: NORMAL
SPECIMEN: NORMAL
SPECIMEN: NORMAL

## 2023-09-29 LAB
CULTURE: NORMAL
Lab: NORMAL
SPECIMEN: NORMAL

## 2023-10-11 ENCOUNTER — APPOINTMENT (OUTPATIENT)
Dept: GENERAL RADIOLOGY | Age: 48
DRG: 638 | End: 2023-10-11
Payer: MEDICARE

## 2023-10-11 ENCOUNTER — HOSPITAL ENCOUNTER (INPATIENT)
Age: 48
LOS: 3 days | Discharge: HOME | DRG: 638 | End: 2023-10-14
Attending: STUDENT IN AN ORGANIZED HEALTH CARE EDUCATION/TRAINING PROGRAM | Admitting: STUDENT IN AN ORGANIZED HEALTH CARE EDUCATION/TRAINING PROGRAM
Payer: MEDICARE

## 2023-10-11 DIAGNOSIS — L03.032 CELLULITIS OF TOE OF LEFT FOOT: Primary | ICD-10-CM

## 2023-10-11 LAB
ALBUMIN SERPL-MCNC: 4 GM/DL (ref 3.4–5)
ALP BLD-CCNC: 87 IU/L (ref 40–129)
ALT SERPL-CCNC: 7 U/L (ref 10–40)
ANION GAP SERPL CALCULATED.3IONS-SCNC: 20 MMOL/L (ref 4–16)
AST SERPL-CCNC: 13 IU/L (ref 15–37)
BASOPHILS ABSOLUTE: 0.1 K/CU MM
BASOPHILS RELATIVE PERCENT: 1 % (ref 0–1)
BILIRUB SERPL-MCNC: 0.4 MG/DL (ref 0–1)
BUN SERPL-MCNC: 58 MG/DL (ref 6–23)
CALCIUM SERPL-MCNC: 7.7 MG/DL (ref 8.3–10.6)
CHLORIDE BLD-SCNC: 96 MMOL/L (ref 99–110)
CO2: 22 MMOL/L (ref 21–32)
CREAT SERPL-MCNC: 8.6 MG/DL (ref 0.9–1.3)
CRP SERPL HS-MCNC: 26.2 MG/L
CRP SERPL HS-MCNC: 27.4 MG/L
DIFFERENTIAL TYPE: ABNORMAL
EOSINOPHILS ABSOLUTE: 0.2 K/CU MM
EOSINOPHILS RELATIVE PERCENT: 2.9 % (ref 0–3)
ERYTHROCYTE SEDIMENTATION RATE: 80 MM/HR (ref 0–15)
GFR SERPL CREATININE-BSD FRML MDRD: 7 ML/MIN/1.73M2
GLUCOSE SERPL-MCNC: 128 MG/DL (ref 70–99)
HCT VFR BLD CALC: 34.6 % (ref 42–52)
HEMOGLOBIN: 10.5 GM/DL (ref 13.5–18)
IMMATURE NEUTROPHIL %: 0.4 % (ref 0–0.43)
LYMPHOCYTES ABSOLUTE: 0.9 K/CU MM
LYMPHOCYTES RELATIVE PERCENT: 11.6 % (ref 24–44)
MCH RBC QN AUTO: 29.4 PG (ref 27–31)
MCHC RBC AUTO-ENTMCNC: 30.3 % (ref 32–36)
MCV RBC AUTO: 96.9 FL (ref 78–100)
MONOCYTES ABSOLUTE: 0.6 K/CU MM
MONOCYTES RELATIVE PERCENT: 8.3 % (ref 0–4)
NUCLEATED RBC %: 0 %
PDW BLD-RTO: 15.8 % (ref 11.7–14.9)
PLATELET # BLD: 203 K/CU MM (ref 140–440)
PMV BLD AUTO: 9.6 FL (ref 7.5–11.1)
POTASSIUM SERPL-SCNC: 5 MMOL/L (ref 3.5–5.1)
PRO-BNP: ABNORMAL PG/ML
PROCALCITONIN SERPL-MCNC: 0.54 NG/ML
RBC # BLD: 3.57 M/CU MM (ref 4.6–6.2)
SEGMENTED NEUTROPHILS ABSOLUTE COUNT: 5.6 K/CU MM
SEGMENTED NEUTROPHILS RELATIVE PERCENT: 75.8 % (ref 36–66)
SODIUM BLD-SCNC: 138 MMOL/L (ref 135–145)
TOTAL IMMATURE NEUTOROPHIL: 0.03 K/CU MM
TOTAL NUCLEATED RBC: 0 K/CU MM
TOTAL PROTEIN: 8.6 GM/DL (ref 6.4–8.2)
WBC # BLD: 7.3 K/CU MM (ref 4–10.5)

## 2023-10-11 PROCEDURE — 83036 HEMOGLOBIN GLYCOSYLATED A1C: CPT

## 2023-10-11 PROCEDURE — 99285 EMERGENCY DEPT VISIT HI MDM: CPT

## 2023-10-11 PROCEDURE — 36415 COLL VENOUS BLD VENIPUNCTURE: CPT

## 2023-10-11 PROCEDURE — 87077 CULTURE AEROBIC IDENTIFY: CPT

## 2023-10-11 PROCEDURE — 2580000003 HC RX 258: Performed by: STUDENT IN AN ORGANIZED HEALTH CARE EDUCATION/TRAINING PROGRAM

## 2023-10-11 PROCEDURE — 6370000000 HC RX 637 (ALT 250 FOR IP): Performed by: STUDENT IN AN ORGANIZED HEALTH CARE EDUCATION/TRAINING PROGRAM

## 2023-10-11 PROCEDURE — 87186 SC STD MICRODIL/AGAR DIL: CPT

## 2023-10-11 PROCEDURE — 84145 PROCALCITONIN (PCT): CPT

## 2023-10-11 PROCEDURE — 6360000002 HC RX W HCPCS: Performed by: STUDENT IN AN ORGANIZED HEALTH CARE EDUCATION/TRAINING PROGRAM

## 2023-10-11 PROCEDURE — 85652 RBC SED RATE AUTOMATED: CPT

## 2023-10-11 PROCEDURE — 73630 X-RAY EXAM OF FOOT: CPT

## 2023-10-11 PROCEDURE — 85025 COMPLETE CBC W/AUTO DIFF WBC: CPT

## 2023-10-11 PROCEDURE — 87075 CULTR BACTERIA EXCEPT BLOOD: CPT

## 2023-10-11 PROCEDURE — 71045 X-RAY EXAM CHEST 1 VIEW: CPT

## 2023-10-11 PROCEDURE — 83880 ASSAY OF NATRIURETIC PEPTIDE: CPT

## 2023-10-11 PROCEDURE — 87040 BLOOD CULTURE FOR BACTERIA: CPT

## 2023-10-11 PROCEDURE — 86140 C-REACTIVE PROTEIN: CPT

## 2023-10-11 PROCEDURE — 80053 COMPREHEN METABOLIC PANEL: CPT

## 2023-10-11 PROCEDURE — 1200000000 HC SEMI PRIVATE

## 2023-10-11 PROCEDURE — 87070 CULTURE OTHR SPECIMN AEROBIC: CPT

## 2023-10-11 PROCEDURE — 96365 THER/PROPH/DIAG IV INF INIT: CPT

## 2023-10-11 RX ORDER — MAGNESIUM SULFATE IN WATER 40 MG/ML
2000 INJECTION, SOLUTION INTRAVENOUS PRN
Status: DISCONTINUED | OUTPATIENT
Start: 2023-10-11 | End: 2023-10-14 | Stop reason: HOSPADM

## 2023-10-11 RX ORDER — CALCIUM CARBONATE 500 MG/1
2 TABLET, CHEWABLE ORAL
Status: DISCONTINUED | OUTPATIENT
Start: 2023-10-12 | End: 2023-10-14 | Stop reason: HOSPADM

## 2023-10-11 RX ORDER — INSULIN LISPRO 100 [IU]/ML
0-4 INJECTION, SOLUTION INTRAVENOUS; SUBCUTANEOUS
Status: DISCONTINUED | OUTPATIENT
Start: 2023-10-12 | End: 2023-10-14 | Stop reason: HOSPADM

## 2023-10-11 RX ORDER — ENOXAPARIN SODIUM 100 MG/ML
40 INJECTION SUBCUTANEOUS DAILY
Status: DISCONTINUED | OUTPATIENT
Start: 2023-10-12 | End: 2023-10-11

## 2023-10-11 RX ORDER — ACETAMINOPHEN 325 MG/1
650 TABLET ORAL EVERY 6 HOURS PRN
Status: DISCONTINUED | OUTPATIENT
Start: 2023-10-11 | End: 2023-10-14 | Stop reason: HOSPADM

## 2023-10-11 RX ORDER — CARVEDILOL 25 MG/1
25 TABLET ORAL 2 TIMES DAILY WITH MEALS
Status: DISCONTINUED | OUTPATIENT
Start: 2023-10-12 | End: 2023-10-14 | Stop reason: HOSPADM

## 2023-10-11 RX ORDER — GLUCAGON 1 MG/ML
1 KIT INJECTION PRN
Status: DISCONTINUED | OUTPATIENT
Start: 2023-10-11 | End: 2023-10-14 | Stop reason: HOSPADM

## 2023-10-11 RX ORDER — POTASSIUM CHLORIDE 20 MEQ/1
40 TABLET, EXTENDED RELEASE ORAL PRN
Status: DISCONTINUED | OUTPATIENT
Start: 2023-10-11 | End: 2023-10-14 | Stop reason: HOSPADM

## 2023-10-11 RX ORDER — SODIUM CHLORIDE 0.9 % (FLUSH) 0.9 %
5-40 SYRINGE (ML) INJECTION 2 TIMES DAILY
Status: DISCONTINUED | OUTPATIENT
Start: 2023-10-12 | End: 2023-10-14 | Stop reason: HOSPADM

## 2023-10-11 RX ORDER — INSULIN GLARGINE 100 [IU]/ML
10 INJECTION, SOLUTION SUBCUTANEOUS NIGHTLY
Status: DISCONTINUED | OUTPATIENT
Start: 2023-10-12 | End: 2023-10-14 | Stop reason: HOSPADM

## 2023-10-11 RX ORDER — ACETAMINOPHEN 650 MG/1
650 SUPPOSITORY RECTAL EVERY 6 HOURS PRN
Status: DISCONTINUED | OUTPATIENT
Start: 2023-10-11 | End: 2023-10-14 | Stop reason: HOSPADM

## 2023-10-11 RX ORDER — ATORVASTATIN CALCIUM 40 MG/1
40 TABLET, FILM COATED ORAL NIGHTLY
Status: DISCONTINUED | OUTPATIENT
Start: 2023-10-12 | End: 2023-10-14 | Stop reason: HOSPADM

## 2023-10-11 RX ORDER — OXYCODONE HYDROCHLORIDE AND ACETAMINOPHEN 5; 325 MG/1; MG/1
1 TABLET ORAL EVERY 6 HOURS PRN
Status: DISCONTINUED | OUTPATIENT
Start: 2023-10-11 | End: 2023-10-14 | Stop reason: HOSPADM

## 2023-10-11 RX ORDER — HEPARIN SODIUM 5000 [USP'U]/ML
5000 INJECTION, SOLUTION INTRAVENOUS; SUBCUTANEOUS EVERY 8 HOURS SCHEDULED
Status: DISCONTINUED | OUTPATIENT
Start: 2023-10-12 | End: 2023-10-14 | Stop reason: HOSPADM

## 2023-10-11 RX ORDER — INSULIN LISPRO 100 [IU]/ML
0-4 INJECTION, SOLUTION INTRAVENOUS; SUBCUTANEOUS NIGHTLY
Status: DISCONTINUED | OUTPATIENT
Start: 2023-10-12 | End: 2023-10-14 | Stop reason: HOSPADM

## 2023-10-11 RX ORDER — ONDANSETRON 4 MG/1
4 TABLET, ORALLY DISINTEGRATING ORAL EVERY 8 HOURS PRN
Status: DISCONTINUED | OUTPATIENT
Start: 2023-10-11 | End: 2023-10-14 | Stop reason: HOSPADM

## 2023-10-11 RX ORDER — ACETAMINOPHEN 500 MG
1000 TABLET ORAL ONCE
Status: COMPLETED | OUTPATIENT
Start: 2023-10-11 | End: 2023-10-11

## 2023-10-11 RX ORDER — ONDANSETRON 2 MG/ML
4 INJECTION INTRAMUSCULAR; INTRAVENOUS EVERY 6 HOURS PRN
Status: DISCONTINUED | OUTPATIENT
Start: 2023-10-11 | End: 2023-10-14 | Stop reason: HOSPADM

## 2023-10-11 RX ORDER — SODIUM CHLORIDE 0.9 % (FLUSH) 0.9 %
5-40 SYRINGE (ML) INJECTION PRN
Status: DISCONTINUED | OUTPATIENT
Start: 2023-10-11 | End: 2023-10-14 | Stop reason: HOSPADM

## 2023-10-11 RX ORDER — POLYETHYLENE GLYCOL 3350 17 G/17G
17 POWDER, FOR SOLUTION ORAL DAILY PRN
Status: DISCONTINUED | OUTPATIENT
Start: 2023-10-11 | End: 2023-10-14 | Stop reason: HOSPADM

## 2023-10-11 RX ORDER — ARIPIPRAZOLE 5 MG/1
5 TABLET ORAL NIGHTLY
Status: DISCONTINUED | OUTPATIENT
Start: 2023-10-12 | End: 2023-10-14 | Stop reason: HOSPADM

## 2023-10-11 RX ORDER — AMLODIPINE BESYLATE 5 MG/1
5 TABLET ORAL DAILY
Status: DISCONTINUED | OUTPATIENT
Start: 2023-10-12 | End: 2023-10-14 | Stop reason: HOSPADM

## 2023-10-11 RX ORDER — SODIUM CHLORIDE 9 MG/ML
INJECTION, SOLUTION INTRAVENOUS PRN
Status: DISCONTINUED | OUTPATIENT
Start: 2023-10-11 | End: 2023-10-14 | Stop reason: HOSPADM

## 2023-10-11 RX ORDER — SODIUM CHLORIDE 9 MG/ML
INJECTION, SOLUTION INTRAVENOUS CONTINUOUS
Status: ACTIVE | OUTPATIENT
Start: 2023-10-12 | End: 2023-10-12

## 2023-10-11 RX ORDER — ASPIRIN 81 MG/1
81 TABLET, CHEWABLE ORAL DAILY
Status: DISCONTINUED | OUTPATIENT
Start: 2023-10-12 | End: 2023-10-14 | Stop reason: HOSPADM

## 2023-10-11 RX ORDER — CALCIUM ACETATE 667 MG/1
1 CAPSULE ORAL
Status: DISCONTINUED | OUTPATIENT
Start: 2023-10-12 | End: 2023-10-14 | Stop reason: HOSPADM

## 2023-10-11 RX ORDER — FAMOTIDINE 20 MG/1
10 TABLET, FILM COATED ORAL DAILY
Status: DISCONTINUED | OUTPATIENT
Start: 2023-10-12 | End: 2023-10-14 | Stop reason: HOSPADM

## 2023-10-11 RX ORDER — DEXTROSE MONOHYDRATE 100 MG/ML
INJECTION, SOLUTION INTRAVENOUS CONTINUOUS PRN
Status: DISCONTINUED | OUTPATIENT
Start: 2023-10-11 | End: 2023-10-14 | Stop reason: HOSPADM

## 2023-10-11 RX ORDER — POTASSIUM CHLORIDE 7.45 MG/ML
10 INJECTION INTRAVENOUS PRN
Status: DISCONTINUED | OUTPATIENT
Start: 2023-10-11 | End: 2023-10-14 | Stop reason: HOSPADM

## 2023-10-11 RX ORDER — CALCITRIOL 0.25 UG/1
0.5 CAPSULE, LIQUID FILLED ORAL DAILY
Status: DISCONTINUED | OUTPATIENT
Start: 2023-10-12 | End: 2023-10-12

## 2023-10-11 RX ORDER — HYDRALAZINE HYDROCHLORIDE 25 MG/1
25 TABLET, FILM COATED ORAL EVERY 8 HOURS SCHEDULED
Status: DISCONTINUED | OUTPATIENT
Start: 2023-10-12 | End: 2023-10-14 | Stop reason: HOSPADM

## 2023-10-11 RX ADMIN — ACETAMINOPHEN 1000 MG: 500 TABLET ORAL at 17:00

## 2023-10-11 RX ADMIN — PIPERACILLIN AND TAZOBACTAM 3375 MG: 3; .375 INJECTION, POWDER, LYOPHILIZED, FOR SOLUTION INTRAVENOUS at 17:01

## 2023-10-11 RX ADMIN — ARIPIPRAZOLE 5 MG: 5 TABLET ORAL at 23:35

## 2023-10-11 RX ADMIN — OXYCODONE AND ACETAMINOPHEN 1 TABLET: 5; 325 TABLET ORAL at 23:34

## 2023-10-11 ASSESSMENT — PAIN DESCRIPTION - ORIENTATION
ORIENTATION: LEFT

## 2023-10-11 ASSESSMENT — PAIN - FUNCTIONAL ASSESSMENT: PAIN_FUNCTIONAL_ASSESSMENT: 0-10

## 2023-10-11 ASSESSMENT — PAIN SCALES - GENERAL
PAINLEVEL_OUTOF10: 5
PAINLEVEL_OUTOF10: 8
PAINLEVEL_OUTOF10: 7

## 2023-10-11 ASSESSMENT — PAIN DESCRIPTION - LOCATION
LOCATION: FOOT

## 2023-10-11 ASSESSMENT — PAIN DESCRIPTION - DESCRIPTORS
DESCRIPTORS: DISCOMFORT;PINS AND NEEDLES
DESCRIPTORS: DISCOMFORT;PINS AND NEEDLES
DESCRIPTORS: SHARP

## 2023-10-11 NOTE — ED PROVIDER NOTES
Emergency Department Encounter    Patient: Ana Pizano  MRN: [id number]  : 1975  Date of Evaluation: 10/11/2023  ED Provider:  Lolis Nguyen MD    Triage Chief Complaint:   Wound Check (Left great toe amputation, home health nurse is concerned for infection. Dr. Fidelina Tran )    [place] IRA:  Ana Pizano is a 50 y.o. male with complex past medical history including osteomyelitis, ESRD on HD MWF, toe amputations, DM, HTN that presents with wound check of left foot wound. Patient had recent amputation and osteomyelitis on cefepime infusion that is ending today. The last couple of days he has noted increased pain, erythema, intermittent chills. His home health nurse noticed yesterday that the odor and drainage was worse than usual and recommended evaluation. Patient reports that this feels like last time that he had osteomyelitis. He is still able to walk but it is painful. He denies any chest pain, shortness of breath, abdominal pain, nausea, vomiting. ROS - see HPI    Past Medical History:   Diagnosis Date    Abscess     scrotal    Acute renal failure (ARF) ([address]) 2019    Anxiety associated with depression     Anxiety associated with depression     Back pain 2012    CAD (coronary artery disease) 2/10/2023    Chest pain 2013    Diabetic nephropathy ([address])     Diabetic neuropathy ([address]) 2011    Diabetic ulcer of left midfoot associated with type 2 diabetes mellitus, with fat layer exposed ([address]) 2017    Diverticulosis     C scope + Dr. Kindra Kidd    DM (diabetes mellitus), type 2 ([address])     DR. 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 Kindra Kidd    Necrotizing fasciitis [facility])     Nose fracture     Panic attacks     Pericarditis 2003    Hospitalized

## 2023-10-11 NOTE — ED NOTES
ED TO INPATIENT SBAR HANDOFF    Patient Name: Monique Pemberton   :  [birth year]  50 y.o. Preferred Name    Family/Caregiver Present no   Restraints no   C-SSRS: Risk of Suicide: No Risk  Sitter no   Sepsis Risk Score Sepsis Risk Score: 1.35      Situation  Chief Complaint   Patient presents with    Wound Check     Left great toe amputation, home health nurse is concerned for infection. Dr. Eagle Chan      Brief Description of Patient's Condition:   Mental Status: oriented  Arrived from: home    Imaging:   XR FOOT LEFT (MIN 3 VIEWS)   Final Result   1. Partial amputation of the left 1st ray at the mid diaphysis of the 1st   metatarsal with cortical irregularity at the amputation margin. Osteomyelitis cannot be excluded on radiograph. Dedicated MRI of the left   forefoot could be obtained for further evaluation as clinically indicated. 2. Diffuse soft tissue edema. Correlate for cellulitis.            Abnormal labs:   Abnormal Labs Reviewed   CBC WITH AUTO DIFFERENTIAL - Abnormal; Notable for the following components:       Result Value    RBC 3.57 (*)     Hemoglobin 10.5 (*)     Hematocrit 34.6 (*)     MCHC 30.3 (*)     RDW 15.8 (*)     Segs Relative 75.8 (*)     Lymphocytes % 11.6 (*)     Monocytes % 8.3 (*)     All other components within normal limits   COMPREHENSIVE METABOLIC PANEL W/ REFLEX TO MG FOR LOW K - Abnormal; Notable for the following components:    Chloride 96 (*)     Anion Gap 20 (*)     Glucose 128 (*)     BUN 58 (*)     Creatinine 8.6 (*)     Est, Glom Filt Rate 7 (*)     Calcium 7.7 (*)     Total Protein 8.6 (*)     ALT 7 (*)     AST 13 (*)     All other components within normal limits   SEDIMENTATION RATE - Abnormal; Notable for the following components:    Sed Rate 80 (*)     All other components within normal limits   C-REACTIVE PROTEIN - Abnormal; Notable for the following components:    CRP High Sensitivity 27.4 (*)     All other components within normal limits   BRAIN NATRIURETIC PEPTIDE pain medication administered: Tylenol 1000mg @1700  NIH Score: NIH     Active LDA's:   Peripheral IV 10/11/23 Proximal;Right Forearm (Active)       Pertinent or High Risk Medications/Drips: no   If Yes, please provide details:   Blood Product Administration: no  If Yes, please provide details:     Recommendation    Incomplete orders   Additional Comments:    If any further questions, please call Sending RN at [address]    Electronically signed by: Electronically signed by Wellington Matute RN on 10/11/2023 at 8:24 PM      Puneet Acosta RN  10/11/23 [address], [address] [facility], [address]  10/11/23 2024

## 2023-10-11 NOTE — H&P
V2.0  History and Physical      Name:  Dino Major /Age/Sex: 1975  (50 y.o. male)   MRN & CSN:  [id number] & [id number] Encounter Date/Time: 10/11/2023 4:55 PM EDT   Location:  ED03/ED-03 PCP: Morelia Gavin MD       Hospital Day: 1    Assessment and Plan:   Dino Major is a 50 y.o. male with a pmh of  hypertension, diabetes mellitus type 2, on long-term insulin, diabetic foot ulcer, s/p amputation, coronary artery disease, history of stenting, ESRD on HD, depression, chronic pain, obesity who presents with L-foot cellulitis    Plan:  Diabetic Foot Ulcer w worsening cellulitis:  -Pt having worsening erythema and white discharge from wound on wound check, no worsening pain, no fever/ chills, doesn't meet sepsis criterion, inflammatory markers pending  -Admit to Med Surg  -Telemetry  -ESR/ CRP/ Lactate/ Procal pending  -Previous wound Cx gowing MSSA, Proteus vulgaris-2023, Enterobacter cloacae-2021  -Pt was on IV Abx as outpatient , cefepime with last dose due today  -Follow wound Cx  -Pt s/p Zosyn in ED  -Will s/w Vancomycin and Meropenem  -Will consult ID  -Will consult Surgery  -MRI Foot  -S/p amputation of the distal phalanx of right great toe, amputation of the right fourth toe at the metatarsophalangeal joint    ESRD on HD:  Mild Hyperkalemia:  Raised BNP likely iso above:  -Strict I&O  -Avoid Nephrotoxic medications  -Nephrology consult  -Follow CXR    CAD- c/w ASA, atorvastatin, carvedilol  HTN- amlodipine, carvedilol  DM- Lantus 25U QHS and LDSSI w hypoglycemia protocol  HLD- atorvastatin  Depression- abilify  Chronic pain- percocet  Anemia- likely ACD, H/H stable.  Monitor  Misc- c/w rest of home meds except contraindicated    Disposition:   Current Living situation: Home  Expected Disposition: Home  Estimated D/C: 2-3 days    Diet No diet orders on file   DVT Prophylaxis [] Lovenox, []  Heparin, [] SCDs, [] Ambulation,  [] Eliquis, [] Xarelto, [] Coumadin   Code Status Prior infection Additional signs and symptoms: left great toe amputation, possible infection Relevant Medical/Surgical History: left great toe amputation, possible infection FINDINGS: Partial amputation of the 1st ray at the mid diaphysis of the left 1st metatarsal.  Cortical irregularity at the amputation margin of the 1st metatarsal.  Infection cannot be excluded. Remote partial amputation of the 2nd metatarsal head and amputation of the 4th toe. Diffuse soft tissue edema with suspected distal wound/ulceration medially. Severe atherosclerotic disease. 1. Partial amputation of the left 1st ray at the mid diaphysis of the 1st metatarsal with cortical irregularity at the amputation margin. Osteomyelitis cannot be excluded on radiograph. Dedicated MRI of the left forefoot could be obtained for further evaluation as clinically indicated. 2. Diffuse soft tissue edema. Correlate for cellulitis.          Electronically signed by Debra Hutchins MD on 10/11/2023 at 4:55 PM

## 2023-10-11 NOTE — CARE COORDINATION
MCG criteria for Cellulitis reviewed at this time, criteria supports Inpatient Admission.  YANI,RN/CM

## 2023-10-12 ENCOUNTER — APPOINTMENT (OUTPATIENT)
Dept: MRI IMAGING | Age: 48
DRG: 638 | End: 2023-10-12
Payer: MEDICARE

## 2023-10-12 LAB
ALBUMIN SERPL-MCNC: 3.3 GM/DL (ref 3.4–5)
ALP BLD-CCNC: 66 IU/L (ref 40–129)
ALT SERPL-CCNC: 6 U/L (ref 10–40)
ANION GAP SERPL CALCULATED.3IONS-SCNC: 16 MMOL/L (ref 4–16)
APTT: 32.4 SECONDS (ref 25.1–37.1)
AST SERPL-CCNC: 10 IU/L (ref 15–37)
BASOPHILS ABSOLUTE: 0 K/CU MM
BASOPHILS RELATIVE PERCENT: 0.6 % (ref 0–1)
BILIRUB SERPL-MCNC: 0.3 MG/DL (ref 0–1)
BUN SERPL-MCNC: 71 MG/DL (ref 6–23)
CALCIUM SERPL-MCNC: 6.4 MG/DL (ref 8.3–10.6)
CHLORIDE BLD-SCNC: 101 MMOL/L (ref 99–110)
CO2: 23 MMOL/L (ref 21–32)
CREAT SERPL-MCNC: 9.7 MG/DL (ref 0.9–1.3)
DIFFERENTIAL TYPE: ABNORMAL
EOSINOPHILS ABSOLUTE: 0.2 K/CU MM
EOSINOPHILS RELATIVE PERCENT: 3.6 % (ref 0–3)
ESTIMATED AVERAGE GLUCOSE: 111 MG/DL
GFR SERPL CREATININE-BSD FRML MDRD: 6 ML/MIN/1.73M2
GLUCOSE BLD-MCNC: 121 MG/DL (ref 70–99)
GLUCOSE BLD-MCNC: 165 MG/DL (ref 70–99)
GLUCOSE BLD-MCNC: 167 MG/DL (ref 70–99)
GLUCOSE BLD-MCNC: 199 MG/DL (ref 70–99)
GLUCOSE SERPL-MCNC: 125 MG/DL (ref 70–99)
HBA1C MFR BLD: 5.5 % (ref 4.2–6.3)
HBV SURFACE AB SERPL IA-ACNC: 14.65 M[IU]/ML
HBV SURFACE AG SERPL QL IA: NON REACTIVE
HCT VFR BLD CALC: 27 % (ref 42–52)
HEMOGLOBIN: 8.4 GM/DL (ref 13.5–18)
IMMATURE NEUTROPHIL %: 0.6 % (ref 0–0.43)
INR BLD: 1.2 INDEX
LACTATE: 0.7 MMOL/L (ref 0.5–1.9)
LYMPHOCYTES ABSOLUTE: 1.1 K/CU MM
LYMPHOCYTES RELATIVE PERCENT: 21.7 % (ref 24–44)
MCH RBC QN AUTO: 30 PG (ref 27–31)
MCHC RBC AUTO-ENTMCNC: 31.1 % (ref 32–36)
MCV RBC AUTO: 96.4 FL (ref 78–100)
MONOCYTES ABSOLUTE: 0.6 K/CU MM
MONOCYTES RELATIVE PERCENT: 12 % (ref 0–4)
NUCLEATED RBC %: 0 %
PDW BLD-RTO: 16.1 % (ref 11.7–14.9)
PHOSPHORUS: 10.2 MG/DL (ref 2.5–4.9)
PLATELET # BLD: 162 K/CU MM (ref 140–440)
PMV BLD AUTO: 9.6 FL (ref 7.5–11.1)
POTASSIUM SERPL-SCNC: 5.4 MMOL/L (ref 3.5–5.1)
PROTHROMBIN TIME: 15.9 SECONDS (ref 11.7–14.5)
RBC # BLD: 2.8 M/CU MM (ref 4.6–6.2)
SEGMENTED NEUTROPHILS ABSOLUTE COUNT: 3.2 K/CU MM
SEGMENTED NEUTROPHILS RELATIVE PERCENT: 61.5 % (ref 36–66)
SODIUM BLD-SCNC: 140 MMOL/L (ref 135–145)
TOTAL IMMATURE NEUTOROPHIL: 0.03 K/CU MM
TOTAL NUCLEATED RBC: 0 K/CU MM
TOTAL PROTEIN: 6.3 GM/DL (ref 6.4–8.2)
WBC # BLD: 5.3 K/CU MM (ref 4–10.5)

## 2023-10-12 PROCEDURE — 85025 COMPLETE CBC W/AUTO DIFF WBC: CPT

## 2023-10-12 PROCEDURE — 82962 GLUCOSE BLOOD TEST: CPT

## 2023-10-12 PROCEDURE — 84100 ASSAY OF PHOSPHORUS: CPT

## 2023-10-12 PROCEDURE — 85730 THROMBOPLASTIN TIME PARTIAL: CPT

## 2023-10-12 PROCEDURE — 94761 N-INVAS EAR/PLS OXIMETRY MLT: CPT

## 2023-10-12 PROCEDURE — 1200000000 HC SEMI PRIVATE

## 2023-10-12 PROCEDURE — 87340 HEPATITIS B SURFACE AG IA: CPT

## 2023-10-12 PROCEDURE — 2500000003 HC RX 250 WO HCPCS: Performed by: STUDENT IN AN ORGANIZED HEALTH CARE EDUCATION/TRAINING PROGRAM

## 2023-10-12 PROCEDURE — 90945 DIALYSIS ONE EVALUATION: CPT

## 2023-10-12 PROCEDURE — 6370000000 HC RX 637 (ALT 250 FOR IP): Performed by: INTERNAL MEDICINE

## 2023-10-12 PROCEDURE — 86706 HEP B SURFACE ANTIBODY: CPT

## 2023-10-12 PROCEDURE — 5A1D70Z PERFORMANCE OF URINARY FILTRATION, INTERMITTENT, LESS THAN 6 HOURS PER DAY: ICD-10-PCS | Performed by: INTERNAL MEDICINE

## 2023-10-12 PROCEDURE — 73718 MRI LOWER EXTREMITY W/O DYE: CPT

## 2023-10-12 PROCEDURE — 36415 COLL VENOUS BLD VENIPUNCTURE: CPT

## 2023-10-12 PROCEDURE — 6360000002 HC RX W HCPCS: Performed by: STUDENT IN AN ORGANIZED HEALTH CARE EDUCATION/TRAINING PROGRAM

## 2023-10-12 PROCEDURE — 85610 PROTHROMBIN TIME: CPT

## 2023-10-12 PROCEDURE — 6370000000 HC RX 637 (ALT 250 FOR IP): Performed by: STUDENT IN AN ORGANIZED HEALTH CARE EDUCATION/TRAINING PROGRAM

## 2023-10-12 PROCEDURE — 2580000003 HC RX 258: Performed by: STUDENT IN AN ORGANIZED HEALTH CARE EDUCATION/TRAINING PROGRAM

## 2023-10-12 PROCEDURE — 87040 BLOOD CULTURE FOR BACTERIA: CPT

## 2023-10-12 PROCEDURE — 90935 HEMODIALYSIS ONE EVALUATION: CPT

## 2023-10-12 PROCEDURE — 83605 ASSAY OF LACTIC ACID: CPT

## 2023-10-12 PROCEDURE — 80053 COMPREHEN METABOLIC PANEL: CPT

## 2023-10-12 PROCEDURE — 6360000002 HC RX W HCPCS

## 2023-10-12 RX ORDER — CALCIUM GLUCONATE 20 MG/ML
2000 INJECTION, SOLUTION INTRAVENOUS ONCE
Status: COMPLETED | OUTPATIENT
Start: 2023-10-12 | End: 2023-10-12

## 2023-10-12 RX ORDER — LACTOBACILLUS RHAMNOSUS GG 10B CELL
1 CAPSULE ORAL
Status: DISCONTINUED | OUTPATIENT
Start: 2023-10-12 | End: 2023-10-14 | Stop reason: HOSPADM

## 2023-10-12 RX ORDER — CALCITRIOL 0.25 UG/1
0.5 CAPSULE, LIQUID FILLED ORAL 2 TIMES DAILY
Status: DISCONTINUED | OUTPATIENT
Start: 2023-10-12 | End: 2023-10-14 | Stop reason: HOSPADM

## 2023-10-12 RX ADMIN — SODIUM CHLORIDE, PRESERVATIVE FREE 10 ML: 5 INJECTION INTRAVENOUS at 00:32

## 2023-10-12 RX ADMIN — SODIUM CHLORIDE: 9 INJECTION, SOLUTION INTRAVENOUS at 23:03

## 2023-10-12 RX ADMIN — HEPARIN SODIUM 5000 UNITS: 5000 INJECTION INTRAVENOUS; SUBCUTANEOUS at 23:04

## 2023-10-12 RX ADMIN — CALCIUM CARBONATE 1000 MG: 500 TABLET, CHEWABLE ORAL at 15:06

## 2023-10-12 RX ADMIN — MEROPENEM 500 MG: 500 INJECTION, POWDER, FOR SOLUTION INTRAVENOUS at 23:03

## 2023-10-12 RX ADMIN — FAMOTIDINE 10 MG: 20 TABLET ORAL at 09:04

## 2023-10-12 RX ADMIN — CALCIUM ACETATE 667 MG: 667 CAPSULE ORAL at 15:06

## 2023-10-12 RX ADMIN — Medication 1 CAPSULE: at 18:40

## 2023-10-12 RX ADMIN — OXYCODONE AND ACETAMINOPHEN 1 TABLET: 5; 325 TABLET ORAL at 18:40

## 2023-10-12 RX ADMIN — SODIUM CHLORIDE, PRESERVATIVE FREE 10 ML: 5 INJECTION INTRAVENOUS at 21:25

## 2023-10-12 RX ADMIN — CALCIUM ACETATE 667 MG: 667 CAPSULE ORAL at 18:16

## 2023-10-12 RX ADMIN — SODIUM CHLORIDE: 9 INJECTION, SOLUTION INTRAVENOUS at 00:23

## 2023-10-12 RX ADMIN — HYDRALAZINE HYDROCHLORIDE 25 MG: 25 TABLET, FILM COATED ORAL at 15:06

## 2023-10-12 RX ADMIN — HEPARIN SODIUM 5000 UNITS: 5000 INJECTION INTRAVENOUS; SUBCUTANEOUS at 15:06

## 2023-10-12 RX ADMIN — HYDRALAZINE HYDROCHLORIDE 25 MG: 25 TABLET, FILM COATED ORAL at 09:03

## 2023-10-12 RX ADMIN — CALCIUM ACETATE 667 MG: 667 CAPSULE ORAL at 09:03

## 2023-10-12 RX ADMIN — INSULIN GLARGINE 10 UNITS: 100 INJECTION, SOLUTION SUBCUTANEOUS at 00:28

## 2023-10-12 RX ADMIN — INSULIN GLARGINE 10 UNITS: 100 INJECTION, SOLUTION SUBCUTANEOUS at 21:25

## 2023-10-12 RX ADMIN — ASPIRIN 81 MG CHEWABLE TABLET 81 MG: 81 TABLET CHEWABLE at 09:04

## 2023-10-12 RX ADMIN — AMLODIPINE BESYLATE 5 MG: 5 TABLET ORAL at 09:03

## 2023-10-12 RX ADMIN — CALCITRIOL CAPSULES 0.25 MCG 0.5 MCG: 0.25 CAPSULE ORAL at 09:03

## 2023-10-12 RX ADMIN — CARVEDILOL 25 MG: 25 TABLET, FILM COATED ORAL at 18:16

## 2023-10-12 RX ADMIN — CALCIUM GLUCONATE 2000 MG: 20 INJECTION, SOLUTION INTRAVENOUS at 18:26

## 2023-10-12 RX ADMIN — CALCIUM CARBONATE 1000 MG: 500 TABLET, CHEWABLE ORAL at 09:04

## 2023-10-12 RX ADMIN — ARIPIPRAZOLE 5 MG: 5 TABLET ORAL at 21:25

## 2023-10-12 RX ADMIN — CARVEDILOL 25 MG: 25 TABLET, FILM COATED ORAL at 09:03

## 2023-10-12 RX ADMIN — HEPARIN SODIUM 5000 UNITS: 5000 INJECTION INTRAVENOUS; SUBCUTANEOUS at 05:49

## 2023-10-12 RX ADMIN — ATORVASTATIN CALCIUM 40 MG: 40 TABLET, FILM COATED ORAL at 21:25

## 2023-10-12 RX ADMIN — OXYCODONE AND ACETAMINOPHEN 1 TABLET: 5; 325 TABLET ORAL at 09:03

## 2023-10-12 RX ADMIN — VANCOMYCIN HYDROCHLORIDE 2500 MG: 1.25 INJECTION, POWDER, LYOPHILIZED, FOR SOLUTION INTRAVENOUS at 06:01

## 2023-10-12 RX ADMIN — HYDRALAZINE HYDROCHLORIDE 25 MG: 25 TABLET, FILM COATED ORAL at 23:04

## 2023-10-12 RX ADMIN — CALCIUM CARBONATE 1000 MG: 500 TABLET, CHEWABLE ORAL at 18:16

## 2023-10-12 RX ADMIN — MEROPENEM 1000 MG: 1 INJECTION, POWDER, FOR SOLUTION INTRAVENOUS at 02:13

## 2023-10-12 RX ADMIN — CALCITRIOL CAPSULES 0.25 MCG 0.5 MCG: 0.25 CAPSULE ORAL at 21:25

## 2023-10-12 ASSESSMENT — PAIN SCALES - GENERAL
PAINLEVEL_OUTOF10: 7
PAINLEVEL_OUTOF10: 4
PAINLEVEL_OUTOF10: 0
PAINLEVEL_OUTOF10: 0
PAINLEVEL_OUTOF10: 5
PAINLEVEL_OUTOF10: 7
PAINLEVEL_OUTOF10: 3

## 2023-10-12 ASSESSMENT — PAIN DESCRIPTION - ORIENTATION
ORIENTATION: LEFT

## 2023-10-12 ASSESSMENT — PAIN DESCRIPTION - ONSET: ONSET: ON-GOING

## 2023-10-12 ASSESSMENT — PAIN DESCRIPTION - LOCATION
LOCATION: FOOT

## 2023-10-12 ASSESSMENT — PAIN DESCRIPTION - FREQUENCY: FREQUENCY: CONTINUOUS

## 2023-10-12 ASSESSMENT — PAIN DESCRIPTION - PAIN TYPE: TYPE: ACUTE PAIN

## 2023-10-12 ASSESSMENT — PAIN SCALES - WONG BAKER: WONGBAKER_NUMERICALRESPONSE: 0

## 2023-10-12 ASSESSMENT — PAIN DESCRIPTION - DESCRIPTORS
DESCRIPTORS: PINS AND NEEDLES
DESCRIPTORS: DISCOMFORT

## 2023-10-12 NOTE — CARE COORDINATION
LSW reviewed chart and met with pt to initiate dc planning, discuss concerns for readmission. Pt reported PTA living with roommate in story home, but able to reside on the first floor. Pt reported being indp with ADLs, amb with cane and or RW, [facility] for wound care, receives transportation [facility] to[facility]from  on [facility]. Pt confirmed having health and RX insurance. Pt reported dc plan is to return home with cont'd [facility] and denies further dc needs.   Electronically signed by DARSHANA Arroyo on 10/12/2023 at 8:59 AM

## 2023-10-12 NOTE — PROGRESS NOTES
4 Eyes Skin Assessment     NAME:  Peg Prior OF BIRTH:  1975  MEDICAL RECORD NUMBER:  [id number]    The patient is being assessed for  Admission    I agree that at least one RN has performed a thorough Head to Toe Skin Assessment on the patient. ALL assessment sites listed below have been assessed. Areas assessed by both nurses:    Head, Face, Ears, Shoulders, Back, Chest, Arms, Elbows, Hands, Sacrum. Buttock, Coccyx, Ischium, and Legs. Feet and Heels        Does the Patient have a Wound? Yes wound(s) were present on assessment.  LDA wound assessment was Initiated and completed by RN       Pradeep Prevention initiated by RN: Yes  Wound Care Orders initiated by RN: Yes    Pressure Injury (Stage 3,4, Unstageable, DTI, NWPT, and Complex wounds) if present, place Wound referral order by RN under : No    New Ostomies, if present place, Ostomy referral order under : No     Nurse 1 eSignature: Electronically signed by Santi Junior RN on 10/12/23 at 12:00 AM EDT    **SHARE this note so that the co-signing nurse can place an eSignature**    Nurse 2 eSignature: Electronically signed by Shreya Avitia RN on 10/12/23 at 12:03 AM EDT Patient is calling stating that she now is paying for her own health insurance and was told that if she needs a physical that the doctor would put her on a 3 year plan where as long as she comes in within the three years for a physical that the doctor would be able to continue filling her prescriptions for her medications. Patient states that she is going to be signing up through [employer] savings plan and once she does that she knows she will have to change pharmacies to where she is getting her prescriptions from.  Patient just want to make sure that the doctor would be ok with her coming in once every three years for a physical unless something is wrong then patient will come in sooner paying self pay.  For any questions or to futher discuss with patient please call at [phone]

## 2023-10-12 NOTE — PROGRESS NOTES
V2.0    [facility] [place]      Name:  Zeke Fang /Age/Sex: 1975  (50 y.o. male)   MRN & CSN:  [id number] & [id number] Encounter Date/Time: 10/12/2023 2:07 PM EDT   Location:  [address] [place] [address] PCP: Joesph Andrews MD     Attending:Fly Long, [address] Day: 2    Assessment and Recommendations   Zeke Fang is a 50 y.o. male with a pmh of  hypertension, diabetes mellitus type 2, on long-term insulin, diabetic foot ulcer, s/p amputation, coronary artery disease, history of stenting, ESRD on HD, depression, chronic pain, obesity who presents with L-foot cellulitis      Plan:     Diabetic Foot Ulcer w worsening cellulitis:  -Pt having worsening erythema and white discharge from wound on wound check, no worsening pain, no fever/ chills, doesn't meet sepsis criterion, inflammatory markers pending  -Admit to Med Surg  -Telemetry  -Previous wound Cx gowing MSSA, Proteus vulgaris-2023, Enterobacter cloacae-2021  -Pt was on IV Abx as outpatient , cefepime with last dose due today  -Follow wound Cx  -Pt s/p Zosyn in ED  -continue Vancomycin and Meropenem  -ID on board  -Will consult Surgery  -MRI Foot  -S/p amputation of the distal phalanx of right great toe, amputation of the right fourth toe at the metatarsophalangeal joint     ESRD on HD:  Mild Hyperkalemia:  Raised BNP likely iso above:  -Strict I&O  -Avoid Nephrotoxic medications  -Nephrology consult  -Follow CXR     CAD- c/w ASA, atorvastatin, carvedilol  HTN- amlodipine, carvedilol  DM- Lantus 25U QHS and LDSSI w hypoglycemia protocol  HLD- atorvastatin  Depression- abilify  Chronic pain- percocet  Anemia- likely ACD, H/H stable.  Monitor  Misc- c/w rest of home meds except contraindicated      Diet No diet orders on file   DVT Prophylaxis [] Lovenox, []  Heparin, [] SCDs, [] Ambulation,  [] Eliquis, [] Xarelto  [] Coumadin   Code Status Full Code   Disposition From: Home  Expected Disposition: TBD  Estimated Date of Discharge: TBD  Patient 02:00 AM    RBCUA <1 08/30/2023 02:00 AM    MUCUS RARE 07/24/2022 11:40 PM    TRICHOMONAS NONE SEEN 08/30/2023 02:00 AM    BACTERIA NEGATIVE 08/30/2023 02:00 AM    CLARITYU CLEAR 08/30/2023 02:00 AM    SPECGRAV 1.010 08/30/2023 02:00 AM    LEUKOCYTESUR NEGATIVE 08/30/2023 02:00 AM    UROBILINOGEN 1.0 08/30/2023 02:00 AM    BILIRUBINUR NEGATIVE 08/30/2023 02:00 AM    BILIRUBINUR neg 06/20/2013 09:51 AM    BLOODU TRACE 08/30/2023 02:00 AM    GLUCOSEU 1,000 06/20/2013 09:51 AM    KETUA NEGATIVE 08/30/2023 02:00 AM    AMORPHOUS RARE 07/24/2022 11:40 PM     Urine Cultures: No results found for: \"LABURIN\"  Blood Cultures: No results found for: \"BC\"  No results found for: \"BLOODCULT2\"  Organism:   Lab Results   Component Value Date/Time    ORG SAUR 01/07/2019 12:08 AM         Electronically signed by Norman Mitchell MD on 10/12/2023 at 2:07 PM

## 2023-10-12 NOTE — PROGRESS NOTES
LOVENOX PROPHYLAXIS EVALUATION  (Populations not addressed in this protocol: trauma, obstetrics, or COVID-19)    Wt Readings from Last 3 Encounters:   10/11/23 261 lb (118.4 kg)   09/26/23 247 lb (112 kg)   09/23/23 247 lb (112 kg)       Estimated Creatinine Clearance: 13 mL/min (A) (based on SCr of 8.6 mg/dL (H)).   Recent Labs     10/11/23  1411   BUN 58*   CREATININE 8.6*      HGB 10.5*   HCT 34.6*       Weight Range: 101-150.9 kg    CRCL <15 or dialysis    50.9 kg   and below     .9  kg   101-150.9 kg   151-174.9  kg   175 kg  or greater     Heparin 5,000 units  subq BID     Heparin 5,000 units  subq TID       Heparin 5,000 units  subq TID   Heparin 5,000 units  subq TID   Heparin 7,500 units  subq TID       Per P/T protocol for appropriate subq anticoagulation by weight and CRCL change to:    Heparin 5,000 units subq TID      MIRELA Rendon [facility]  11:18 PM  10/11/23

## 2023-10-12 NOTE — PROGRESS NOTES
Removed 1.5 L fluid. Patient Name: Mary Ann Montemayor  Patient : 1975  MRN: [id number]     Acct: [de-identified]  Date of Admission: 10/11/2023  Room/Bed: 4115/4115-A  Code Status:  Full Code  Allergies:    Allergies   Allergen Reactions    Adhesive Tape Rash    Doxycycline Nausea And Vomiting    Reglan [Metoclopramide] Anxiety     Diagnosis:    Patient Active Problem List   Diagnosis    Hiatal hernia    Diabetes mellitus type 2, improved controlled    Diabetic neuropathy (HCC)    Panic attack    Anxiety associated with depression    Neck pain    DANIELA (obstructive sleep apnea)    Urinary frequency    Bilateral carpal tunnel syndrome- left worse than right    Hyperlipidemia with target LDL less than 100    Essential hypertension    Bronchitis    Osteomyelitis (HCC)    Abscess    Periumbilical hernia    Sepsis (HCC)    Cellulitis of left foot    Constipation    Intractable nausea and vomiting    Uncontrolled type 2 diabetes mellitus    Nausea and vomiting    Diabetic foot infection (HCC)    Acute renal failure (ARF) (HCC)    Cellulitis    Cellulitis of left arm    Cellulitis, scrotum    Acute epididymitis    Irene gangrene    Recurrent major depressive disorder, in full remission ([address])    Generalized anxiety disorder    Morbid obesity with body mass index (BMI) of 40.0 to 44.9 in adult [facility])    Necrotizing fasciitis (HCC)    Syncope    Right groin wound    Ulcer of right groin with fat layer exposed ([address])    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 type 2 diabetes mellitus, with muscle involvement without evidence of necrosis (HCC)    Nephrotic syndrome    Generalized edema    Stage 3b chronic kidney disease (HCC)    Diabetic nephropathy associated with type 2 diabetes mellitus (HCC)    Hypoalbuminemia    Chronic kidney disease, stage IV (severe) (HCC)    FH: CAD (coronary artery disease)    ASCVD (arteriosclerotic cardiovascular disease)    Other proteinuria    Chest pain    Surgical

## 2023-10-13 LAB
GLUCOSE BLD-MCNC: 126 MG/DL (ref 70–99)
GLUCOSE BLD-MCNC: 131 MG/DL (ref 70–99)
GLUCOSE BLD-MCNC: 135 MG/DL (ref 70–99)
GLUCOSE BLD-MCNC: 141 MG/DL (ref 70–99)

## 2023-10-13 PROCEDURE — 2500000003 HC RX 250 WO HCPCS: Performed by: STUDENT IN AN ORGANIZED HEALTH CARE EDUCATION/TRAINING PROGRAM

## 2023-10-13 PROCEDURE — 94761 N-INVAS EAR/PLS OXIMETRY MLT: CPT

## 2023-10-13 PROCEDURE — 6370000000 HC RX 637 (ALT 250 FOR IP): Performed by: STUDENT IN AN ORGANIZED HEALTH CARE EDUCATION/TRAINING PROGRAM

## 2023-10-13 PROCEDURE — 6370000000 HC RX 637 (ALT 250 FOR IP): Performed by: INTERNAL MEDICINE

## 2023-10-13 PROCEDURE — 2580000003 HC RX 258: Performed by: STUDENT IN AN ORGANIZED HEALTH CARE EDUCATION/TRAINING PROGRAM

## 2023-10-13 PROCEDURE — 99211 OFF/OP EST MAY X REQ PHY/QHP: CPT

## 2023-10-13 PROCEDURE — 82962 GLUCOSE BLOOD TEST: CPT

## 2023-10-13 PROCEDURE — 6360000002 HC RX W HCPCS: Performed by: STUDENT IN AN ORGANIZED HEALTH CARE EDUCATION/TRAINING PROGRAM

## 2023-10-13 PROCEDURE — 1200000000 HC SEMI PRIVATE

## 2023-10-13 RX ORDER — SODIUM HYPOCHLORITE 1.25 MG/ML
SOLUTION TOPICAL DAILY
Status: DISCONTINUED | OUTPATIENT
Start: 2023-10-13 | End: 2023-10-13

## 2023-10-13 RX ADMIN — FAMOTIDINE 10 MG: 20 TABLET ORAL at 08:23

## 2023-10-13 RX ADMIN — HEPARIN SODIUM 5000 UNITS: 5000 INJECTION INTRAVENOUS; SUBCUTANEOUS at 21:52

## 2023-10-13 RX ADMIN — AMLODIPINE BESYLATE 5 MG: 5 TABLET ORAL at 08:23

## 2023-10-13 RX ADMIN — SODIUM CHLORIDE, PRESERVATIVE FREE 10 ML: 5 INJECTION INTRAVENOUS at 08:24

## 2023-10-13 RX ADMIN — ARIPIPRAZOLE 5 MG: 5 TABLET ORAL at 21:52

## 2023-10-13 RX ADMIN — INSULIN GLARGINE 10 UNITS: 100 INJECTION, SOLUTION SUBCUTANEOUS at 21:52

## 2023-10-13 RX ADMIN — HYDRALAZINE HYDROCHLORIDE 25 MG: 25 TABLET, FILM COATED ORAL at 08:23

## 2023-10-13 RX ADMIN — HEPARIN SODIUM 5000 UNITS: 5000 INJECTION INTRAVENOUS; SUBCUTANEOUS at 06:13

## 2023-10-13 RX ADMIN — SODIUM CHLORIDE: 9 INJECTION, SOLUTION INTRAVENOUS at 22:01

## 2023-10-13 RX ADMIN — SODIUM CHLORIDE, PRESERVATIVE FREE 10 ML: 5 INJECTION INTRAVENOUS at 21:53

## 2023-10-13 RX ADMIN — CALCITRIOL CAPSULES 0.25 MCG 0.5 MCG: 0.25 CAPSULE ORAL at 08:23

## 2023-10-13 RX ADMIN — Medication 1 CAPSULE: at 08:23

## 2023-10-13 RX ADMIN — OXYCODONE AND ACETAMINOPHEN 1 TABLET: 5; 325 TABLET ORAL at 08:27

## 2023-10-13 RX ADMIN — CALCIUM CARBONATE 1000 MG: 500 TABLET, CHEWABLE ORAL at 12:28

## 2023-10-13 RX ADMIN — HEPARIN SODIUM 5000 UNITS: 5000 INJECTION INTRAVENOUS; SUBCUTANEOUS at 13:44

## 2023-10-13 RX ADMIN — CALCIUM ACETATE 667 MG: 667 CAPSULE ORAL at 08:23

## 2023-10-13 RX ADMIN — HYDRALAZINE HYDROCHLORIDE 25 MG: 25 TABLET, FILM COATED ORAL at 16:24

## 2023-10-13 RX ADMIN — MEROPENEM 500 MG: 500 INJECTION, POWDER, FOR SOLUTION INTRAVENOUS at 22:02

## 2023-10-13 RX ADMIN — OXYCODONE AND ACETAMINOPHEN 1 TABLET: 5; 325 TABLET ORAL at 18:36

## 2023-10-13 RX ADMIN — CALCITRIOL CAPSULES 0.25 MCG 0.5 MCG: 0.25 CAPSULE ORAL at 21:52

## 2023-10-13 RX ADMIN — ASPIRIN 81 MG CHEWABLE TABLET 81 MG: 81 TABLET CHEWABLE at 08:23

## 2023-10-13 RX ADMIN — CALCIUM CARBONATE 1000 MG: 500 TABLET, CHEWABLE ORAL at 08:23

## 2023-10-13 RX ADMIN — ATORVASTATIN CALCIUM 40 MG: 40 TABLET, FILM COATED ORAL at 21:51

## 2023-10-13 RX ADMIN — CALCIUM ACETATE 667 MG: 667 CAPSULE ORAL at 16:24

## 2023-10-13 RX ADMIN — CARVEDILOL 25 MG: 25 TABLET, FILM COATED ORAL at 16:24

## 2023-10-13 RX ADMIN — CALCIUM CARBONATE 1000 MG: 500 TABLET, CHEWABLE ORAL at 16:24

## 2023-10-13 RX ADMIN — CALCIUM ACETATE 667 MG: 667 CAPSULE ORAL at 12:28

## 2023-10-13 RX ADMIN — CARVEDILOL 25 MG: 25 TABLET, FILM COATED ORAL at 08:23

## 2023-10-13 ASSESSMENT — PAIN SCALES - GENERAL
PAINLEVEL_OUTOF10: 7
PAINLEVEL_OUTOF10: 6
PAINLEVEL_OUTOF10: 3
PAINLEVEL_OUTOF10: 6
PAINLEVEL_OUTOF10: 5

## 2023-10-13 ASSESSMENT — PAIN DESCRIPTION - LOCATION
LOCATION: FOOT

## 2023-10-13 ASSESSMENT — PAIN SCALES - WONG BAKER
WONGBAKER_NUMERICALRESPONSE: 0
WONGBAKER_NUMERICALRESPONSE: 0

## 2023-10-13 ASSESSMENT — PAIN DESCRIPTION - ORIENTATION
ORIENTATION: RIGHT
ORIENTATION: LEFT
ORIENTATION: RIGHT
ORIENTATION: LEFT

## 2023-10-13 NOTE — PROGRESS NOTES
Nephrology Progress Note        [address], [address] [facility], [address] [place] [address]  Phone: [phone]  Office Hours: 8:30AM - 4:30PM  Monday - Friday        10/13/2023 8:36 AM  Subjective:   Admit Date: 10/11/2023  PCP: Enma Cuevas MD  Interval History:     Diet: ADULT DIET; Regular; 5 carb choices (75 gm/meal)      Data:   Scheduled Meds:   vancomycin (VANCOCIN) intermittent dosing (placeholder)   Other RX Placeholder    calcitRIOL  0.5 mcg Oral BID    lactobacillus  1 capsule Oral Daily with breakfast    amLODIPine  5 mg Oral Daily    ARIPiprazole  5 mg Oral Nightly    atorvastatin  40 mg Oral Nightly    calcium acetate  1 capsule Oral TID WC    calcium carbonate  2 tablet Oral TID WC    carvedilol  25 mg Oral BID WC    famotidine  10 mg Oral Daily    hydrALAZINE  25 mg Oral 3 times per day    insulin glargine  10 Units SubCUTAneous Nightly    insulin lispro  0-4 Units SubCUTAneous TID WC    insulin lispro  0-4 Units SubCUTAneous Nightly    sodium chloride flush  5-40 mL IntraVENous BID    meropenem  500 mg IntraVENous Q24H    aspirin  81 mg Oral Daily    heparin (porcine)  5,000 Units SubCUTAneous 3 times per day     Continuous Infusions:   sodium chloride 25 mL/hr at 10/12/23 2303    dextrose       PRN Meds:sodium chloride flush, sodium chloride, ondansetron **OR** ondansetron, polyethylene glycol, acetaminophen **OR** acetaminophen, magnesium sulfate, potassium chloride **OR** potassium alternative oral replacement **OR** potassium chloride, oxyCODONE-acetaminophen, glucose, dextrose bolus **OR** dextrose bolus, glucagon (rDNA), dextrose  I/O last 3 completed shifts: In: 740 [P.O.:240]  Out: 2000   No intake/output data recorded.     Intake/Output Summary (Last 24 hours) at 10/13/2023 0836  Last data filed at 10/13/2023 0217  Gross per 24 hour   Intake 740 ml   Output 2000 ml   Net -1260 ml       CBC:   Recent Labs     10/11/23  1411 10/12/23  0540   WBC 7.3 5.3   HGB 10.5* 8.4*    162

## 2023-10-13 NOTE — PROGRESS NOTES
Wound care- left plantar cleanse with NS, apply Dakins moist dressing, abd, and kerlix , patient tolerated well.

## 2023-10-13 NOTE — CONSULTS
[facility] Continence Nurse  Consult Note       Shay Gomez  AGE: 50 y.o. GENDER: male  : 1975  TODAY'S DATE:  10/13/2023    Subjective:     Reason for  Evaluation and Assessment: wound care evalJoe Gomez is a 50 y.o. male referred by:   [x] Physician  [] Nursing  [] Other:     Wound Identification:  Wound Type: diabetic, pressure, and non-healing surgical  Contributing Factors: diabetes and chronic pressure        PAST MEDICAL HISTORY        Diagnosis Date    Abscess 2010    scrotal    Acute renal failure (ARF) ([address]) 2019    Anxiety associated with depression     Anxiety associated with depression     Back pain 2012    CAD (coronary artery disease) 2/10/2023    Chest pain 2013    Diabetic nephropathy ([address])     Diabetic neuropathy ([address]) 2011    Diabetic ulcer of left midfoot associated with type 2 diabetes mellitus, with fat layer exposed ([address]) 2017    Diverticulosis     C scope + Dr. Torres Means    DM (diabetes mellitus), type 2 ([address])     DR. 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 Torres Means    Necrotizing fasciitis [facility])     Nose fracture     Panic attacks     Pericarditis     Hospitalized with s/p heart cath normal    Peripheral autonomic neuropathy due to diabetes mellitus ([address])     Axonal EMG- NCS, 2011    Sebaceous cyst 2011    URI (upper respiratory infection) 2012    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 type 2 diabetes mellitus, with muscle involvement without evidence of necrosis ([address]) 2021    WD-Wound, surgical, infected, initial encounter 2017    Wrist fracture        PAST SURGICAL HISTORY    Past Surgical History:   Procedure Laterality Date    ABDOMEN SURGERY Number of days: 1433       Wound 03/21/19 Foot Left;Posterior hard callus area (Active)   Number of days: 6174       Wound 03/24/21 Left;Plantar (Active)   Number of days: 932       Wound 05/13/21 Left;Plantar (Active)   Number of days: 882       Wound 05/17/21 Groin Right scrotum extends to lower buttock (Active)   Number of days: 879       Wound 09/01/22 #1 Left Plantar (Active)   Number of days: 406       Wound 08/01/23 Left;Plantar (Active)   Wound Image   10/13/23 1000   Wound Etiology Surgical 10/13/23 1000   Dressing Status New dressing applied 10/13/23 1000   Wound Cleansed Cleansed with saline 10/13/23 1000   Dressing/Treatment Moist to dry;ABD;Roll gauze 10/13/23 1000   Offloading for Diabetic Foot Ulcers Offloading boot 09/04/23 0620   Dressing Change Due 09/13/23 10/12/23 0858   Wound Length (cm) 13.7 cm 10/13/23 1000   Wound Width (cm) 5.5 cm 10/13/23 1000   Wound Depth (cm) 2.3 cm 10/13/23 1000   Wound Surface Area (cm^2) 75.35 cm^2 10/13/23 1000   Change in Wound Size % (l*w) -370.94 10/13/23 1000   Wound Volume (cm^3) 173.305 cm^3 10/13/23 1000   Wound Healing % -983 10/13/23 1000   Distance Tunneling (cm) 0 cm 10/13/23 1000   Tunneling Position ___ O'Clock 0 10/13/23 1000   Undermining Starts ___ O'Clock 0 10/13/23 1000   Undermining Ends___ O'Clock 0 10/13/23 1000   Undermining Maxium Distance (cm) 0 10/13/23 1000   Wound Assessment Pink/red;Slough 10/13/23 1000   Drainage Amount Large (50-75% saturated) 10/13/23 1000   Drainage Description Serosanguinous 10/13/23 1000   Odor Mild 10/13/23 1000   Shazia-wound Assessment Intact; Hyperkeratosis (callous) 10/13/23 1000   Margins Defined edges 10/13/23 1000   Wound Thickness Description not for Pressure Injury Full thickness 10/13/23 1000   Number of days: 72       Response to treatment:  Well tolerated by patient.      Pain Assessment:  Severity:  none  Quality of pain:   Wound Pain Timing/Severity:   Premedicated: no    Plan:     Plan of Care: Wound

## 2023-10-13 NOTE — PLAN OF CARE
Problem: Discharge Planning  Goal: Discharge to home or other facility with appropriate resources  10/13/2023 1355 by Lei Vega LPN  Outcome: Progressing  10/13/2023 0514 by Ninoska Smiley RN  Outcome: Progressing     Problem: Pain  Goal: Verbalizes/displays adequate comfort level or baseline comfort level  10/13/2023 1355 by Lei Vega LPN  Outcome: Progressing  10/13/2023 0514 by Ninoska Smiley RN  Outcome: Progressing     Problem: Chronic Conditions and Co-morbidities  Goal: Patient's chronic conditions and co-morbidity symptoms are monitored and maintained or improved  10/13/2023 1355 by Lei Vega LPN  Outcome: Progressing  10/13/2023 0514 by Ninoska Smiley RN  Outcome: Progressing

## 2023-10-13 NOTE — PLAN OF CARE
Problem: Discharge Planning  Goal: Discharge to home or other facility with appropriate resources  Outcome: Progressing     Problem: Pain  Goal: Verbalizes/displays adequate comfort level or baseline comfort level  Outcome: Progressing     Problem: Safety - Adult  Goal: Free from fall injury  Outcome: Progressing     Problem: Chronic Conditions and Co-morbidities  Goal: Patient's chronic conditions and co-morbidity symptoms are monitored and maintained or improved  Outcome: Progressing     Problem: Skin/Tissue Integrity - Adult  Goal: Incisions, wounds, or drain sites healing without S/S of infection  Outcome: Progressing

## 2023-10-13 NOTE — CARE COORDINATION
Reviewed chart, spoke with Dr Aliza Guevara and then pt. Pt active with [facility] but nurse with [facility] telling pt the they can not longer come out, d/t w-d dressing not a skill. Discussed with pt and open to [facility]. agency . Called/faxed  [phone] fax [phone] Referral to Luna Hurd with [facility]. Luna Hurd is concerned that W-d dressing are not a skill for [facility]. also, she is reaching out to her wound care nurse. If HC not an option, pt may need to be seen in [facility] more often. Placed on Weekend list for f/u in AM of 10/13. Also gave Luna Hurd both weekend CM info.

## 2023-10-13 NOTE — PROGRESS NOTES
Relevant Medical/Surgical History: left great toe amputation, possible infection FINDINGS: Partial amputation of the 1st ray at the mid diaphysis of the left 1st metatarsal.  Cortical irregularity at the amputation margin of the 1st metatarsal.  Infection cannot be excluded. Remote partial amputation of the 2nd metatarsal head and amputation of the 4th toe. Diffuse soft tissue edema with suspected distal wound/ulceration medially. Severe atherosclerotic disease. 1. Partial amputation of the left 1st ray at the mid diaphysis of the 1st metatarsal with cortical irregularity at the amputation margin. Osteomyelitis cannot be excluded on radiograph. Dedicated MRI of the left forefoot could be obtained for further evaluation as clinically indicated. 2. Diffuse soft tissue edema. Correlate for cellulitis. CBC:   Recent Labs     10/11/23  1411 10/12/23  0540   WBC 7.3 5.3   HGB 10.5* 8.4*    162     BMP:    Recent Labs     10/11/23  1411 10/12/23  0540    140   K 5.0 5.4*   CL 96* 101   CO2 22 23   BUN 58* 71*   CREATININE 8.6* 9.7*   GLUCOSE 128* 125*     Hepatic:   Recent Labs     10/11/23  1411 10/12/23  0540   AST 13* 10*   ALT 7* 6*   BILITOT 0.4 0.3   ALKPHOS 87 66     Lipids:   Lab Results   Component Value Date/Time    CHOL 166 09/22/2022 02:02 PM    CHOL 168 10/15/2013 10:30 AM    HDL 30 09/22/2022 02:02 PM    TRIG 254 09/22/2022 02:02 PM     Hemoglobin A1C:   Lab Results   Component Value Date/Time    LABA1C 5.5 10/11/2023 05:42 PM     TSH: No results found for: \"TSH\"  Troponin:   Lab Results   Component Value Date/Time    TROPONINT 0.176 02/08/2023 05:45 AM    TROPONINT 0.183 02/07/2023 09:17 PM    TROPONINT 0.155 09/13/2022 11:49 AM     Lactic Acid: No results for input(s): \"LACTA\" in the last 72 hours.   BNP:   Recent Labs     10/11/23  1411   PROBNP 60,486*     UA:  Lab Results   Component Value Date/Time    NITRU NEGATIVE 08/30/2023 02:00 AM    COLORU YELLOW 08/30/2023 02:00 AM

## 2023-10-13 NOTE — CONSULTS
potassium bicarb-citric acid (EFFER-K) effervescent tablet 40 mEq  40 mEq Oral PRN Erin Carson MD        Or    potassium chloride 10 mEq/100 mL IVPB (Peripheral Line)  10 mEq IntraVENous PRN Erin Carson MD        oxyCODONE-acetaminophen (PERCOCET) 5-325 MG per tablet 1 tablet  1 tablet Oral Q6H PRN Erin Carson MD   1 tablet at 10/13/23 0827    meropenem (MERREM) 500 mg in sodium chloride 0.9 % 100 mL IVPB (mini-bag)  500 mg IntraVENous Q24H Erin Carson MD   Stopped at 10/13/23 0230    aspirin chewable tablet 81 mg  81 mg Oral Daily Erin Carson MD   81 mg at 10/13/23 0233    heparin (porcine) injection 5,000 Units  5,000 Units SubCUTAneous 3 times per day Erin Carson MD   5,000 Units at 10/13/23 2293    glucose chewable tablet 16 g  4 tablet Oral PRN Erin Carson MD        dextrose bolus 10% 125 mL  125 mL IntraVENous PRN Erin Carson MD        Or    dextrose bolus 10% 250 mL  250 mL IntraVENous PRKIMBERLEE Carson MD        glucagon injection 1 mg  1 mg SubCUTAneous PRN Erin Carson MD        dextrose 10 % infusion   IntraVENous Continuous PRKIMBERLEE Carson MD             LABS    Results for orders placed or performed during the hospital encounter of 10/11/23   Culture, Wound Aerobic Only    Specimen: Toe   Result Value Ref Range    Specimen TOE     Special Requests Description:  >Aerobic Only       Culture Prelim Report     Culture (A)      ACHROMOBACTER SPECIES Rare growth ID and sensitivity to follow   Culture, Blood 1    Specimen: Blood   Result Value Ref Range    Specimen BLOOD     Special Requests NONE     Culture NO GROWTH AT 24 HOURS    Culture, Blood 2    Specimen: Blood   Result Value Ref Range    Specimen BLOOD     Special Requests NONE     Culture NO GROWTH AT 24 HOURS    CBC with Auto Differential   Result Value Ref Range    WBC 7.3 4.0 - 10.5 K/CU MM    RBC 3.57 (L) 4.6 - 6.2 M/CU MM    Hemoglobin 10.5 (L) 13.5 - 18.0 GM/DL    Hematocrit 34.6 (L) 42 - 52 %    MCV 96.9 78 - 100 FL    MCH MG/DL   POCT Glucose   Result Value Ref Range    POC Glucose 199 (H) 70 - 99 MG/DL   POCT Glucose   Result Value Ref Range    POC Glucose 167 (H) 70 - 99 MG/DL   POCT Glucose   Result Value Ref Range    POC Glucose 126 (H) 70 - 99 MG/DL   POCT Glucose   Result Value Ref Range    POC Glucose 131 (H) 70 - 99 MG/DL         IMAGING    EXAMINATION:  MRI OF THE LEFT FOOT WITHOUT CONTRAST, 10/12/2023 4:08 pm    IMPRESSION:  1. Soft tissue wound/ulceration medially with underlying T2 heterogeneous  signal which could reflect phlegmon/necrotic tissue. No well-defined fluid  collection. Underlying osteomyelitis of the residual 1st metatarsal.  2. Marrow edema along the lateral border of the medial cuneiform and  involving the intermediate cuneiform, proximal 2nd metatarsal, and 3rd  metatarsal head with decreased T1 signal at the sites. Osteomyelitis cannot  be excluded. Assessment: This is a man with diabetes and multiple complications thereof, including ESRD on HD and PAD with a chronic left foot ulcer. He had a partial amputation, but presents back with increasing pain, erythema, and drainage per notes. He has grown MSSA in the past, but his current culture is growing Achromobacter with susceptibilities pending. Recommendations:     Stop vancomycin. Achromobacter is often resistant to carbapenems. I would consider adding TMP/SMX for now until susceptibilities return. This organism can certainly be resistant to TMP/SMX as well, and we sometimes need to use a very broad-spectrum gram-negative agent such as cefiderocol or eravacycline. However, he appears to be afebrile and stable, so starting with adding TMP/SMX to meropenem is reasonable. Meropenem will provide excellent gram-positive coverage, just not for MRSA. Unless there is surgical cure, he will need 6 weeks of antibiotics to treat osteomyelitis.   It would be totally reasonable to request a surgical consult to see if he would

## 2023-10-13 NOTE — CONSULTS
Cigarettes     Quit date: 2022     Years since quittin.2    Smokeless tobacco: Never    Tobacco comments:     Smokes when drinking/social   Vaping Use    Vaping Use: Never used   Substance and Sexual Activity    Alcohol use: Not Currently     Comment: occ    Drug use: Yes     Frequency: 7.0 times per week     Types: Marijuana Melissa Rival)     Comment: 21 @ 2000    Sexual activity: Yes     Partners: Female   Other Topics Concern    Not on file   Social History Narrative    Not on file     Social Determinants of Health     Financial Resource Strain: Not on file   Food Insecurity: Not on file   Transportation Needs: Not on file   Physical Activity: Not on file   Stress: Not on file   Social Connections: Not on file   Intimate Partner Violence: Not on file   Housing Stability: Not on file       ROS: Non-contributory    Physical Exam:  Vitals:    10/13/23 0857   BP:    Pulse:    Resp: (!) 8   Temp:    SpO2:        Chest: Breath sounds were clear and equal with no rales, wheezes, or rhonchi. Respiratory effort was normal with no retractions or use of accessory muscles. Cardiovascular: Heart sounds were normal with a regular rate and rhythm. There were no murmurs or gallops. Abdomen: Bowel sounds were normal.  The abdomen was soft and non distended. There was no tenderness, guarding, rebound, or rigidity. There was no masses, hepatosplenomegaly, or hernias. The patient has had several toe amputations on the right foot which have healed examination of the left foot shows that the wound is actually stable. There is no cellulitis no foul smelling there is a exudative film on the bed of granulation tissue which I debrided at bedside. At home the patient had been receiving wet-to-dry Dakin's solution. He had been on wound VAC but that was macerating the skin. I did debride some of the skin and subcutaneous tissue and fascia at the bedside but overall the wound looks healthy.   Labs    CBC:   Lab Results

## 2023-10-14 VITALS
BODY MASS INDEX: 38.01 KG/M2 | RESPIRATION RATE: 17 BRPM | HEART RATE: 71 BPM | WEIGHT: 256.62 LBS | DIASTOLIC BLOOD PRESSURE: 99 MMHG | TEMPERATURE: 98 F | HEIGHT: 69 IN | OXYGEN SATURATION: 98 % | SYSTOLIC BLOOD PRESSURE: 150 MMHG

## 2023-10-14 LAB
DOSE AMOUNT: NORMAL
DOSE TIME: NORMAL
GLUCOSE BLD-MCNC: 121 MG/DL (ref 70–99)
GLUCOSE BLD-MCNC: 96 MG/DL (ref 70–99)
VANCOMYCIN RANDOM: 15.5 UG/ML

## 2023-10-14 PROCEDURE — 90935 HEMODIALYSIS ONE EVALUATION: CPT

## 2023-10-14 PROCEDURE — 82962 GLUCOSE BLOOD TEST: CPT

## 2023-10-14 PROCEDURE — 6370000000 HC RX 637 (ALT 250 FOR IP): Performed by: STUDENT IN AN ORGANIZED HEALTH CARE EDUCATION/TRAINING PROGRAM

## 2023-10-14 PROCEDURE — 80202 ASSAY OF VANCOMYCIN: CPT

## 2023-10-14 PROCEDURE — 6370000000 HC RX 637 (ALT 250 FOR IP): Performed by: INTERNAL MEDICINE

## 2023-10-14 PROCEDURE — 94761 N-INVAS EAR/PLS OXIMETRY MLT: CPT

## 2023-10-14 PROCEDURE — 2500000003 HC RX 250 WO HCPCS: Performed by: STUDENT IN AN ORGANIZED HEALTH CARE EDUCATION/TRAINING PROGRAM

## 2023-10-14 PROCEDURE — 6360000002 HC RX W HCPCS: Performed by: STUDENT IN AN ORGANIZED HEALTH CARE EDUCATION/TRAINING PROGRAM

## 2023-10-14 PROCEDURE — 36415 COLL VENOUS BLD VENIPUNCTURE: CPT

## 2023-10-14 RX ORDER — SULFAMETHOXAZOLE AND TRIMETHOPRIM 800; 160 MG/1; MG/1
1 TABLET ORAL EVERY 12 HOURS SCHEDULED
Status: DISCONTINUED | OUTPATIENT
Start: 2023-10-14 | End: 2023-10-14 | Stop reason: DRUGHIGH

## 2023-10-14 RX ORDER — ASPIRIN 81 MG/1
81 TABLET, CHEWABLE ORAL DAILY
Qty: 30 TABLET | Refills: 3 | Status: SHIPPED | OUTPATIENT
Start: 2023-10-14 | End: 2023-10-14 | Stop reason: SDUPTHER

## 2023-10-14 RX ORDER — SULFAMETHOXAZOLE AND TRIMETHOPRIM 400; 80 MG/1; MG/1
1 TABLET ORAL EVERY 12 HOURS SCHEDULED
Qty: 28 TABLET | Refills: 0 | Status: SHIPPED | OUTPATIENT
Start: 2023-10-14 | End: 2023-10-14 | Stop reason: SDUPTHER

## 2023-10-14 RX ORDER — SULFAMETHOXAZOLE AND TRIMETHOPRIM 400; 80 MG/1; MG/1
1 TABLET ORAL EVERY 12 HOURS SCHEDULED
Qty: 28 TABLET | Refills: 0 | Status: SHIPPED | OUTPATIENT
Start: 2023-10-14 | End: 2023-10-28

## 2023-10-14 RX ORDER — LACTOBACILLUS RHAMNOSUS GG 10B CELL
1 CAPSULE ORAL
Qty: 14 CAPSULE | Refills: 0 | Status: SHIPPED | OUTPATIENT
Start: 2023-10-14 | End: 2023-10-28

## 2023-10-14 RX ORDER — ASPIRIN 81 MG/1
81 TABLET, CHEWABLE ORAL DAILY
Qty: 30 TABLET | Refills: 3 | Status: SHIPPED | OUTPATIENT
Start: 2023-10-14

## 2023-10-14 RX ORDER — SULFAMETHOXAZOLE AND TRIMETHOPRIM 400; 80 MG/1; MG/1
1 TABLET ORAL EVERY 12 HOURS SCHEDULED
Status: DISCONTINUED | OUTPATIENT
Start: 2023-10-14 | End: 2023-10-14 | Stop reason: HOSPADM

## 2023-10-14 RX ORDER — SULFAMETHOXAZOLE AND TRIMETHOPRIM 800; 160 MG/1; MG/1
1 TABLET ORAL EVERY 12 HOURS SCHEDULED
Qty: 28 TABLET | Refills: 0 | Status: CANCELLED | OUTPATIENT
Start: 2023-10-14 | End: 2023-10-28

## 2023-10-14 RX ORDER — LACTOBACILLUS RHAMNOSUS GG 10B CELL
1 CAPSULE ORAL
Qty: 14 CAPSULE | Refills: 0 | Status: SHIPPED | OUTPATIENT
Start: 2023-10-14 | End: 2023-10-14 | Stop reason: SDUPTHER

## 2023-10-14 RX ORDER — SULFAMETHOXAZOLE AND TRIMETHOPRIM 400; 80 MG/1; MG/1
1 TABLET ORAL EVERY 12 HOURS SCHEDULED
Status: DISCONTINUED | OUTPATIENT
Start: 2023-10-14 | End: 2023-10-14

## 2023-10-14 RX ADMIN — CALCITRIOL CAPSULES 0.25 MCG 0.5 MCG: 0.25 CAPSULE ORAL at 13:41

## 2023-10-14 RX ADMIN — AMLODIPINE BESYLATE 5 MG: 5 TABLET ORAL at 13:42

## 2023-10-14 RX ADMIN — OXYCODONE AND ACETAMINOPHEN 1 TABLET: 5; 325 TABLET ORAL at 00:32

## 2023-10-14 RX ADMIN — ASPIRIN 81 MG CHEWABLE TABLET 81 MG: 81 TABLET CHEWABLE at 13:42

## 2023-10-14 RX ADMIN — OXYCODONE AND ACETAMINOPHEN 1 TABLET: 5; 325 TABLET ORAL at 06:24

## 2023-10-14 RX ADMIN — FAMOTIDINE 10 MG: 20 TABLET ORAL at 13:42

## 2023-10-14 RX ADMIN — Medication 1 CAPSULE: at 13:41

## 2023-10-14 RX ADMIN — HEPARIN SODIUM 5000 UNITS: 5000 INJECTION INTRAVENOUS; SUBCUTANEOUS at 06:04

## 2023-10-14 RX ADMIN — CARVEDILOL 25 MG: 25 TABLET, FILM COATED ORAL at 13:42

## 2023-10-14 RX ADMIN — CALCIUM ACETATE 667 MG: 667 CAPSULE ORAL at 13:42

## 2023-10-14 RX ADMIN — OXYCODONE AND ACETAMINOPHEN 1 TABLET: 5; 325 TABLET ORAL at 13:41

## 2023-10-14 RX ADMIN — CALCIUM CARBONATE 1000 MG: 500 TABLET, CHEWABLE ORAL at 13:42

## 2023-10-14 ASSESSMENT — PAIN SCALES - GENERAL
PAINLEVEL_OUTOF10: 6
PAINLEVEL_OUTOF10: 7

## 2023-10-14 ASSESSMENT — PAIN DESCRIPTION - PAIN TYPE: TYPE: ACUTE PAIN

## 2023-10-14 ASSESSMENT — PAIN DESCRIPTION - LOCATION
LOCATION: FOOT

## 2023-10-14 ASSESSMENT — PAIN DESCRIPTION - ORIENTATION
ORIENTATION: LEFT
ORIENTATION: LEFT

## 2023-10-14 ASSESSMENT — PAIN DESCRIPTION - DESCRIPTORS
DESCRIPTORS: SHARP
DESCRIPTORS: THROBBING

## 2023-10-14 NOTE — PROGRESS NOTES
Nephrology  Dialysis Note        [address], [address] [facility], [address] [place] [address]  Phone: [phone]  Office Hours: 8:30AM - 4:30PM  Monday - Friday          PROCEDURE:  Patient seen during hemodialysis      PHYSICIAN:  ANTHONY      INDICATION:  End-stage renal disease      RX:  See dialysis flowsheet for specifics on access, blood flow rate, dialysate baths, duration of dialysis, anticoagulation and other technical information.       COMMENTS:  SEEN IN HD  SET TO LOSE 3KG IF BP ALLOWS    Electronically signed by Xu Michaud DO on 10/14/2023 at 9:40 AM    [address] [facility] MichiganMD Raisa DO  [address] [place] [address],  [address] [place] [address]  PHONE: [phone]  FAX: [phone]

## 2023-10-14 NOTE — CARE COORDINATION
Conchis Bourne with [facility] called and they are unable to take on patient due the w-d dressing is not considered a skillable need. Patient in HD at this time. CM will revisit to speak with patient regarding [facility] outpatient clinic. Patient may need more frequent visits to the wound clinic.

## 2023-10-14 NOTE — DISCHARGE SUMMARY
V2.0  Discharge Summary    Name:  Bisi Vanegas /Age/Sex: 1975 (50 y.o. male)   Admit Date: 10/11/2023  Discharge Date: 10/14/23    MRN & CSN:  [id number] & [id number] Encounter Date and Time 10/14/23 11:51 AM EDT    Attending:  Jaleesa Silvestre MD Discharging Provider: Jaleesa Silvestre MD       Hospital Course:       Brief Problem Based Course:     Diabetic Foot Ulcer w worsening cellulitis:  -Pt having worsening erythema and white discharge from wound on wound check, no worsening pain, no fever/ chills, doesn't meet sepsis criterion, inflammatory markers pending  -Admit to Med Surg  -Telemetry  -Previous wound Cx gowing MSSA, Proteus vulgaris-2023, Enterobacter cloacae-2021  -Pt was on IV Abx as outpatient , cefepime with last dose due today  -Follow wound Cx  -Pt s/p Zosyn in ED  -patient was evaluated by GS he is a patient of Dr. Brea Jules, he recommends oral bactrim for 14 days and follow up in the office in a week  -MRI Foot  -S/p amputation of the distal phalanx of right great toe, amputation of the right fourth toe at the metatarsophalangeal joint     ESRD on HD:  Mild Hyperkalemia:  Raised BNP likely iso above:  -Strict I&O  -Avoid Nephrotoxic medications  -Nephrology consult-got dialysis today     CAD- c/w ASA, atorvastatin, carvedilol  HTN- amlodipine, carvedilol  DM- Lantus 25U QHS and LDSSI w hypoglycemia protocol  HLD- atorvastatin  Depression- abilify  Chronic pain- percocet  Anemia- likely ACD, H/H stable. Monitor  Misc- c/w rest of home meds except contraindicated      The patient expressed appropriate understanding of, and agreement with the discharge recommendations, medications, and plan.      Consults this admission:  PHARMACY TO DOSE VANCOMYCIN  IP CONSULT TO GENERAL SURGERY  IP CONSULT TO NEPHROLOGY  IP CONSULT TO GENERAL SURGERY  IP CONSULT TO INFECTIOUS DISEASES  IP CONSULT TO GENERAL SURGERY    Discharge Diagnosis:   Cellulitis    Discharge Instruction:   Follow up appointments:

## 2023-10-14 NOTE — PLAN OF CARE
Problem: Discharge Planning  Goal: Discharge to home or other facility with appropriate resources  10/14/2023 0054 by Shane Pressley RN  Outcome: Progressing  10/13/2023 1355 by Jodee Mendez LPN  Outcome: Progressing     Problem: Pain  Goal: Verbalizes/displays adequate comfort level or baseline comfort level  10/14/2023 0054 by Shane Pressley RN  Outcome: Progressing  10/13/2023 1355 by Jodee Mendez LPN  Outcome: Progressing     Problem: Safety - Adult  Goal: Free from fall injury  10/14/2023 0054 by Shane Pressley RN  Outcome: Progressing  10/13/2023 1355 by Jodee Mendez LPN  Outcome: Progressing     Problem: Chronic Conditions and Co-morbidities  Goal: Patient's chronic conditions and co-morbidity symptoms are monitored and maintained or improved  10/14/2023 0054 by Shane Pressley RN  Outcome: Progressing  10/13/2023 1355 by Jodee Mendez LPN  Outcome: Progressing     Problem: Skin/Tissue Integrity - Adult  Goal: Incisions, wounds, or drain sites healing without S/S of infection  10/14/2023 0054 by Shane Pressley RN  Outcome: Progressing  10/13/2023 1355 by Jodee Mendez LPN  Outcome: Progressing

## 2023-10-15 LAB
CULTURE: ABNORMAL
CULTURE: ABNORMAL
CULTURE: NORMAL
CULTURE: NORMAL
Lab: ABNORMAL
Lab: NORMAL
Lab: NORMAL
SPECIMEN: ABNORMAL
SPECIMEN: NORMAL
SPECIMEN: NORMAL

## 2023-10-17 PROBLEM — S91.302A OPEN WOUND OF PLANTAR ASPECT OF LEFT FOOT: Status: ACTIVE | Noted: 2023-10-17

## 2023-10-18 ENCOUNTER — TELEPHONE (OUTPATIENT)
Dept: WOUND CARE | Age: 48
End: 2023-10-18

## 2023-10-28 ENCOUNTER — HOSPITAL ENCOUNTER (EMERGENCY)
Age: 48
Discharge: HOME | End: 2023-10-29
Payer: MEDICARE

## 2023-10-28 DIAGNOSIS — Z51.89 VISIT FOR WOUND CHECK: Primary | ICD-10-CM

## 2023-10-28 PROCEDURE — 99283 EMERGENCY DEPT VISIT LOW MDM: CPT

## 2023-10-28 PROCEDURE — 85025 COMPLETE CBC W/AUTO DIFF WBC: CPT

## 2023-10-28 ASSESSMENT — PAIN - FUNCTIONAL ASSESSMENT: PAIN_FUNCTIONAL_ASSESSMENT: 0-10

## 2023-10-28 ASSESSMENT — PAIN SCALES - GENERAL: PAINLEVEL_OUTOF10: 8

## 2023-10-29 VITALS
BODY MASS INDEX: 37.92 KG/M2 | DIASTOLIC BLOOD PRESSURE: 79 MMHG | HEIGHT: 69 IN | HEART RATE: 96 BPM | TEMPERATURE: 99 F | SYSTOLIC BLOOD PRESSURE: 156 MMHG | RESPIRATION RATE: 19 BRPM | WEIGHT: 256 LBS | OXYGEN SATURATION: 99 %

## 2023-10-29 NOTE — ED NOTES
Dressing applied to left foot, non stick and cobain. Krik Solorzano aware. VSS Patient given discharge instructions medications and follow up care reviewed.  Patient voiced understanding         Lelia Cruz RN  10/29/23 0175

## 2023-10-29 NOTE — ED NOTES
Patient given discharge instructions medications and follow up care reviewed.  Patient voiced understanding         Sanaz Jennings RN  10/29/23 9238

## 2023-10-29 NOTE — ED PROVIDER NOTES
Triage Chief Complaint:   Post-op Problem (Pt states he had his L Great Toe removed approx 3 weeks ago and tonight it started bleeding. )    Lummi:  Today in the ED I had the pleasure of caring for Charity Benavidez who is a 50 y.o. male that presents today to the emergency department for evaluation. Patient is roughly 3 weeks status post left great toe amputation. Seen by his surgeon just 2 weeks ago. Has an appointment in 2 days. Patient states that he has been doing well healing well. Home health nurse just changed his dressing about 4 days ago. He was sitting at home watching the game when his family members informed him that he was having bleeding from his foot. No pain no lightheadedness no dizziness no new trauma or injury. Patient is a diabetic endorses neuropathy minimal pain. ROS:  REVIEW OF SYSTEMS    At least 10 systems reviewed      All other review of systems are negative  See HPI and nursing notes for additional information       Past Medical History:   Diagnosis Date    Abscess 2010    scrotal    Acute renal failure (ARF) ([address]) 08/04/2019    Anxiety associated with depression     Anxiety associated with depression     Back pain 07/02/2012    CAD (coronary artery disease) 2/10/2023    Chest pain 05/01/2013    Diabetic nephropathy ([address])     Diabetic neuropathy ([address]) 12/22/2011    Diabetic ulcer of left midfoot associated with type 2 diabetes mellitus, with fat layer exposed ([address]) 07/18/2017    Diverticulosis     C scope + Dr. Martha Elizabeth    DM (diabetes mellitus), type 2 ([address]) 2002    DR. 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 Martha Elizabeth    Necrotizing fasciitis [facility])     Nose fracture 1988    Panic attacks     Pericarditis 2003    Hospitalized with s/p heart cath normal    Peripheral

## 2024-01-12 PROBLEM — M79.672 CHRONIC FOOT PAIN, LEFT: Status: ACTIVE | Noted: 2024-01-12

## 2024-01-12 PROBLEM — L97.422 DIABETIC ULCER OF LEFT MIDFOOT ASSOCIATED WITH DIABETES MELLITUS DUE TO UNDERLYING CONDITION, WITH FAT LAYER EXPOSED (HCC): Status: ACTIVE | Noted: 2024-01-12

## 2024-01-12 PROBLEM — E08.621 DIABETIC ULCER OF LEFT MIDFOOT ASSOCIATED WITH DIABETES MELLITUS DUE TO UNDERLYING CONDITION, WITH FAT LAYER EXPOSED (HCC): Status: ACTIVE | Noted: 2024-01-12

## 2024-01-12 PROBLEM — G89.29 CHRONIC FOOT PAIN, LEFT: Status: ACTIVE | Noted: 2024-01-12

## 2024-01-12 PROBLEM — S91.302S: Status: ACTIVE | Noted: 2024-01-12

## 2024-01-15 NOTE — ED NOTES
[facility]  Consult  Name: Serafin Conklin 39 y.o. male I MRN: [id number]  Unit/Bed#: [address] [address] I Date of Admission: 1/15/2024   Date of Service: 1/15/2024 I Hospital Day: 0    Inpatient consult to Internal Medicine  Consult performed by: Yemi Suarez MD  Consult ordered by: Mary Fraser PA-C          Assessment/Plan   Type 2 diabetes mellitus with hyperglycemia, without long-term current use of insulin ([facility])  Assessment & Plan  Lab Results   Component Value Date    HGBA1C 6.9 (A) 10/23/2023       Recent Labs     01/15/24  1233 01/15/24  1333 01/15/24  1713 01/15/24  2057   POCGLU 144* 177* 168* 145*       Blood Sugar Average: Last 72 hrs:  (P) 174.6    On semaglutide  Monitor on sliding scale  Monitor blood glucose  Monitor      * Hypertension  Assessment & Plan  On lisinopril 10 mg daily  Continue lisinopril 10 mg daily hold for systolic blood pressure less than 130 mmHg    Class 3 severe obesity due to excess calories with serious comorbidity and body mass index (BMI) of 40.0 to 44.9 in adult ([facility])  Assessment & Plan  Morbid obesity with BMI of 41.99 kg/m²  Status post Armand-en-Y gastric bypass, 1/15  Doing well postoperatively  Monitor symptoms  Postoperative care as per primary team  Incentive spirometry  Follow-up labs in a.m.  Increase activity as tolerated  Continue IV fluids    Sleep apnea  Assessment & Plan  Not on CPAP               VTE Prophylaxis: VTE Score: 4 Low Risk (Score 0-2) - Encourage Ambulation.  As per primary team    Mobility:      HLM Goal achieved. Continue to encourage appropriate mobility.        Total Time Spent on Date of Encounter in care of patient: 35 mins. This time was spent on one or more of the following: performing physical exam; counseling and coordination of care; obtaining or reviewing history; documenting in the medical record; reviewing/ordering tests, medications or procedures; communicating with other healthcare professionals and discussing  ED TO INPATIENT SBAR HANDOFF    Patient Name: Annette Sicard   :  [birth year]  50 y.o. Preferred Name    Family/Caregiver Present no   Restraints no   C-SSRS: Risk of Suicide: No Risk  Sitter no   Sepsis Risk Score Sepsis Risk Score: 1.71      Situation  Chief Complaint   Patient presents with    Wound Check     L foot, seen last week and given antibiotics, now moving up leg, no improvement     Brief Description of Patient's Condition: pt presented to ED with complaints of foot pain. Pt states that he has had a wound present on the bottom of his lower left foot for some time now, but the pain is increasing making it harder to walk at times. Pt states that he was at Dialysis today and the Dr told him that he needed to come to the ED to have the wound checked. States that he was supposed to see the wound clinic but he does not have transportation to get there. Mental Status: oriented  Arrived from: home    Imaging:   CT FOOT LEFT W CONTRAST   Final Result   1. Large ulcer along the medial aspect of the 1st metatarsal and   metatarsophalangeal joint. There is underlying cellulitis and likely some   tenosynovitis along the margin of the 1st metatarsal and metatarsophalangeal   joint. However there is no drainable abscess and no necrotizing fasciitis. 2.  No cortical erosion or periosteal reaction along the 1st metatarsal or   1st phalanges. There is a severe hallux valgus deformity. 3.  Some additional soft tissue/fluid attenuation at the expected distal head   of the 2nd metatarsal with previous amputation. There is a sharp bony margin   without evidence of cortical erosion at this time. 4.  Elevation and thickening of the great toenail suggesting fungal infection   and correlate clinically. CT TIBIA FIBULA LEFT W CONTRAST   Final Result   1. Subcutaneous edema in the calf with steady worsening around the ankle and   into the foot.   No loculated fluid collection and no soft tissue with patient's family/caregivers.    Collaboration of Care: Were Recommendations Directly Discussed with Primary Treatment Team? Yes    History of Present Illness:  Serafin Conklin is a 39 y.o. male with history of diabetes mellitus type 2, hypertension, morbid obesity, mild sleep apnea who is originally admitted to the bariatric service postoperatively after laparoscopic Armand-en-Y gastric bypass.  Procedure was done under general anesthesia with estimated blood loss was 20 cc.  Patient tolerated procedure well and was subsequently admitted.  Patient history of hypertension, diabetes mellitus type 2, morbid obesity, fatty liver.  We are consulted for medical management..  Patient is comfortably sitting in recliner, reports feeling mildly lightheaded after surgery but was able to ambulate in his room.  Denies any chest pain shortness of breath.  Reports mild postoperative abdominal discomfort.    Review of Systems:  Review of Systems   Constitutional:  Negative for activity change and fever.   HENT:  Negative for trouble swallowing.    Respiratory:  Negative for shortness of breath.    Cardiovascular:  Negative for chest pain.   Gastrointestinal:  Positive for abdominal pain. Negative for nausea and vomiting.   Neurological:  Positive for dizziness.   Psychiatric/Behavioral:  Negative for confusion.        Past Medical and Surgical History:   Past Medical History:   Diagnosis Date    Asthma     as child    Diabetes mellitus (HCC)     Fatty liver     Hypertension     Nasal congestion     Frequently    PONV (postoperative nausea and vomiting)     Sleep apnea     5.5 apnea scale// no cpap       Past Surgical History:   Procedure Laterality Date    EPIDURAL BLOCK INJECTION N/A 11/15/2023    Procedure: L5-S1  LUMBAR epidural steroid injection (03772);  Surgeon: Lewis Pittman DO;  Location: [facility] MAIN OR;  Service: Pain Management     UT SEPTOPLASTY/SUBMUCOUS RESECJ W/WO CARTILAGE GRF N/A 11/28/2022    Procedure:  SEPTOPLASTY;  Surgeon: Abhi Couch MD;  Location: [facility] MAIN OR;  Service: ENT    HI SUBMUCOUS RESCJ INFERIOR TURBINATE PRTL/COMPL Bilateral 2022    Procedure: TURBINECTOMY;  Surgeon: Abhi Couch MD;  Location: [facility] MAIN OR;  Service: ENT       Meds/Allergies:  PTA meds:   Prior to Admission Medications   Prescriptions Last Dose Informant Patient Reported? Taking?   Azelastine HCl 137 MCG/SPRAY SOLN Past Month  No Yes   Si SPRAY INTO EACH NOSTRIL 2 (TWO) TIMES A DAY USE IN EACH NOSTRIL AS DIRECTED   lisinopril (ZESTRIL) 10 mg tablet 2024  No Yes   Sig: Take 1 tablet (10 mg total) by mouth daily   oxyCODONE (Roxicodone) 5 immediate release tablet   No No   Sig: Take 1 tablet (5 mg total) by mouth every 4 (four) hours as needed for moderate pain Max Daily Amount: 30 mg   pantoprazole (PROTONIX) 40 mg tablet   No No   Sig: Take 1 tablet (40 mg total) by mouth daily   semaglutide, 2 mg/dose, (Ozempic, 2 MG/DOSE,) 8 mg/ mL injection pen Past Month  No Yes   Sig: INJECT 0.75 ML (2 MG) SUBCUTANEOUSLY EVERY 7 DAYS   tadalafil (CIALIS) 10 MG tablet Past Week  No Yes   Sig: Take 1 tablet (10 mg total) by mouth daily as needed for erectile dysfunction      Facility-Administered Medications: None       Allergies:   Allergies   Allergen Reactions    Other Angioedema    Shellfish-Derived Products - Food Allergy Anaphylaxis    Penicillins Hives    Penicillin G Hives       Social History:  Marital Status: /Civil Union  Substance Use History:   Social History     Substance and Sexual Activity   Alcohol Use Yes    Comment: rare     Social History     Tobacco Use   Smoking Status Former    Types: Cigars    Quit date: 2023    Years since quittin.4    Passive exposure: Past   Smokeless Tobacco Never   Tobacco Comments    Wife just had baby not smoking right now    Pt stated stopped smoking cigars over a month ago     Social History     Substance and Sexual Activity   Drug Use Never       Family  "History:  Family History   Problem Relation Age of Onset    Diabetes type II Mother     Diabetes Mother     Diabetes Maternal Grandmother     Diabetes type II Maternal Grandmother     Arthritis Maternal Grandmother     Cancer Maternal Grandfather         Liver cancer       Physical Exam:   Vitals:   Blood Pressure: 128/77 (01/15/24 1526)  Pulse: 69 (01/15/24 1526)  Temperature: (!) 96.5 °F (35.8 °C) (01/15/24 1526)  Temp Source: Temporal (01/15/24 1223)  Respirations: 18 (01/15/24 1526)  Height: 5' 6\" (167.6 cm) (01/15/24 0850)  Weight - Scale: 118 kg (260 lb 2.3 oz) (01/15/24 0850)  SpO2: 98 % (01/15/24 1526)    Physical Exam  Vitals and nursing note reviewed.   Constitutional:       General: He is not in acute distress.     Appearance: He is well-developed. He is obese.   HENT:      Head: Normocephalic and atraumatic.   Eyes:      Conjunctiva/sclera: Conjunctivae normal.   Cardiovascular:      Rate and Rhythm: Normal rate and regular rhythm.      Heart sounds: No murmur heard.  Pulmonary:      Effort: Pulmonary effort is normal. No respiratory distress.      Breath sounds: Decreased breath sounds present. No wheezing or rhonchi.   Abdominal:      Palpations: Abdomen is soft.      Tenderness: There is no abdominal tenderness.      Comments: Incision C/D/I   Musculoskeletal:         General: No swelling.      Right lower leg: No edema.      Left lower leg: No edema.   Skin:     General: Skin is warm and dry.      Capillary Refill: Capillary refill takes less than 2 seconds.   Neurological:      General: No focal deficit present.      Mental Status: He is alert and oriented to person, place, and time.   Psychiatric:         Mood and Affect: Mood normal.         Additional Data:   Lab Results:                    Lab Results   Component Value Date/Time    HGBA1C 6.9 (A) 10/23/2023 04:19 PM    HGBA1C 7.3 (H) 08/24/2023 07:56 AM    HGBA1C 6.3 (H) 04/07/2023 09:34 AM    HGBA1C 6.6 (A) 03/16/2023 02:23 PM    HGBA1C 6.9 (A) " 12/15/2022 02:52 PM    HGBA1C 7.1 (H) 11/22/2021 08:48 AM    HGBA1C 8.2 (H) 09/03/2021 09:43 AM    HGBA1C 13.3 (H) 05/20/2021 11:10 AM     Results from last 7 days   Lab Units 01/15/24  1333 01/15/24  1233 01/15/24  0829   POC GLUCOSE mg/dl 177* 144* 239*           Imaging: Reviewed radiology reports from this admission including: None  No orders to display       EKG, Pathology, and Other Studies Reviewed on Admission:   EKG reviewed from cardiology office reveals sinus tachycardia at 100 bpm nonspecific T wave changes.    ** Please Note: This note may have been constructed using a voice recognition system. **

## 2024-02-06 ENCOUNTER — HOSPITAL ENCOUNTER (INPATIENT)
Age: 49
LOS: 6 days | Discharge: HOME HEALTH | DRG: 853 | End: 2024-02-12
Attending: EMERGENCY MEDICINE | Admitting: STUDENT IN AN ORGANIZED HEALTH CARE EDUCATION/TRAINING PROGRAM
Payer: MEDICARE

## 2024-02-06 ENCOUNTER — APPOINTMENT (OUTPATIENT)
Dept: GENERAL RADIOLOGY | Age: 49
DRG: 853 | End: 2024-02-06
Payer: MEDICARE

## 2024-02-06 DIAGNOSIS — A41.9 SEPTICEMIA (HCC): ICD-10-CM

## 2024-02-06 DIAGNOSIS — E87.1 HYPONATREMIA: ICD-10-CM

## 2024-02-06 DIAGNOSIS — L08.9 LEFT FOOT INFECTION: ICD-10-CM

## 2024-02-06 DIAGNOSIS — L97.525 DIABETIC ULCER OF LEFT FOOT ASSOCIATED WITH TYPE 2 DIABETES MELLITUS, WITH MUSCLE INVOLVEMENT WITHOUT EVIDENCE OF NECROSIS, UNSPECIFIED PART OF FOOT (HCC): Primary | ICD-10-CM

## 2024-02-06 DIAGNOSIS — N18.6 ESRD ON HEMODIALYSIS (HCC): ICD-10-CM

## 2024-02-06 DIAGNOSIS — E11.621 DIABETIC ULCER OF LEFT FOOT ASSOCIATED WITH TYPE 2 DIABETES MELLITUS, WITH MUSCLE INVOLVEMENT WITHOUT EVIDENCE OF NECROSIS, UNSPECIFIED PART OF FOOT (HCC): Primary | ICD-10-CM

## 2024-02-06 DIAGNOSIS — Z99.2 ESRD ON HEMODIALYSIS (HCC): ICD-10-CM

## 2024-02-06 LAB
ALBUMIN SERPL-MCNC: 3.8 GM/DL (ref 3.4–5)
ALP BLD-CCNC: 89 IU/L (ref 40–128)
ALT SERPL-CCNC: 11 U/L (ref 10–40)
ANION GAP SERPL CALCULATED.3IONS-SCNC: 19 MMOL/L (ref 7–16)
AST SERPL-CCNC: 13 IU/L (ref 15–37)
BASOPHILS ABSOLUTE: 0 K/CU MM
BASOPHILS RELATIVE PERCENT: 0.2 % (ref 0–1)
BILIRUB SERPL-MCNC: 0.5 MG/DL (ref 0–1)
BUN SERPL-MCNC: 62 MG/DL (ref 6–23)
CALCIUM SERPL-MCNC: 7 MG/DL (ref 8.3–10.6)
CHLORIDE BLD-SCNC: 88 MMOL/L (ref 99–110)
CO2: 24 MMOL/L (ref 21–32)
CREAT SERPL-MCNC: 8.3 MG/DL (ref 0.9–1.3)
CRP SERPL HS-MCNC: 213.8 MG/L
DIFFERENTIAL TYPE: ABNORMAL
EOSINOPHILS ABSOLUTE: 0 K/CU MM
EOSINOPHILS RELATIVE PERCENT: 0 % (ref 0–3)
GFR SERPL CREATININE-BSD FRML MDRD: 7 ML/MIN/1.73M2
GLUCOSE BLD-MCNC: 159 MG/DL (ref 70–99)
GLUCOSE SERPL-MCNC: 229 MG/DL (ref 70–99)
HCT VFR BLD CALC: 29.4 % (ref 42–52)
HEMOGLOBIN: 9.6 GM/DL (ref 13.5–18)
IMMATURE NEUTROPHIL %: 0.5 % (ref 0–0.43)
LACTATE: 2.4 MMOL/L (ref 0.5–1.9)
LACTIC ACID, SEPSIS: 1 MMOL/L (ref 0.4–2)
LACTIC ACID, SEPSIS: 1 MMOL/L (ref 0.4–2)
LIPASE: 20 IU/L (ref 13–60)
LYMPHOCYTES ABSOLUTE: 0.4 K/CU MM
LYMPHOCYTES RELATIVE PERCENT: 1.6 % (ref 24–44)
MCH RBC QN AUTO: 30.4 PG (ref 27–31)
MCHC RBC AUTO-ENTMCNC: 32.7 % (ref 32–36)
MCV RBC AUTO: 93 FL (ref 78–100)
MONOCYTES ABSOLUTE: 1.1 K/CU MM
MONOCYTES RELATIVE PERCENT: 4.8 % (ref 0–4)
NUCLEATED RBC %: 0 %
PDW BLD-RTO: 15.7 % (ref 11.7–14.9)
PLATELET # BLD: 245 K/CU MM (ref 140–440)
PMV BLD AUTO: 9.7 FL (ref 7.5–11.1)
POTASSIUM SERPL-SCNC: 4.2 MMOL/L (ref 3.5–5.1)
PROCALCITONIN SERPL-MCNC: 1.43 NG/ML
RBC # BLD: 3.16 M/CU MM (ref 4.6–6.2)
SEGMENTED NEUTROPHILS ABSOLUTE COUNT: 20.9 K/CU MM
SEGMENTED NEUTROPHILS RELATIVE PERCENT: 92.9 % (ref 36–66)
SODIUM BLD-SCNC: 131 MMOL/L (ref 135–145)
TOTAL IMMATURE NEUTOROPHIL: 0.12 K/CU MM
TOTAL NUCLEATED RBC: 0 K/CU MM
TOTAL PROTEIN: 7.1 GM/DL (ref 6.4–8.2)
WBC # BLD: 22.6 K/CU MM (ref 4–10.5)

## 2024-02-06 PROCEDURE — 96368 THER/DIAG CONCURRENT INF: CPT

## 2024-02-06 PROCEDURE — 85025 COMPLETE CBC W/AUTO DIFF WBC: CPT

## 2024-02-06 PROCEDURE — 6370000000 HC RX 637 (ALT 250 FOR IP): Performed by: STUDENT IN AN ORGANIZED HEALTH CARE EDUCATION/TRAINING PROGRAM

## 2024-02-06 PROCEDURE — 87040 BLOOD CULTURE FOR BACTERIA: CPT

## 2024-02-06 PROCEDURE — 1200000000 HC SEMI PRIVATE

## 2024-02-06 PROCEDURE — 83605 ASSAY OF LACTIC ACID: CPT

## 2024-02-06 PROCEDURE — 6370000000 HC RX 637 (ALT 250 FOR IP): Performed by: EMERGENCY MEDICINE

## 2024-02-06 PROCEDURE — 83690 ASSAY OF LIPASE: CPT

## 2024-02-06 PROCEDURE — 2580000003 HC RX 258: Performed by: EMERGENCY MEDICINE

## 2024-02-06 PROCEDURE — 2580000003 HC RX 258: Performed by: STUDENT IN AN ORGANIZED HEALTH CARE EDUCATION/TRAINING PROGRAM

## 2024-02-06 PROCEDURE — 6360000002 HC RX W HCPCS: Performed by: EMERGENCY MEDICINE

## 2024-02-06 PROCEDURE — 96365 THER/PROPH/DIAG IV INF INIT: CPT

## 2024-02-06 PROCEDURE — 6360000002 HC RX W HCPCS: Performed by: STUDENT IN AN ORGANIZED HEALTH CARE EDUCATION/TRAINING PROGRAM

## 2024-02-06 PROCEDURE — 82962 GLUCOSE BLOOD TEST: CPT

## 2024-02-06 PROCEDURE — 36415 COLL VENOUS BLD VENIPUNCTURE: CPT

## 2024-02-06 PROCEDURE — 80053 COMPREHEN METABOLIC PANEL: CPT

## 2024-02-06 PROCEDURE — 86140 C-REACTIVE PROTEIN: CPT

## 2024-02-06 PROCEDURE — 84145 PROCALCITONIN (PCT): CPT

## 2024-02-06 PROCEDURE — 73630 X-RAY EXAM OF FOOT: CPT

## 2024-02-06 PROCEDURE — 99285 EMERGENCY DEPT VISIT HI MDM: CPT

## 2024-02-06 RX ORDER — ACETAMINOPHEN 325 MG/1
650 TABLET ORAL EVERY 6 HOURS PRN
Status: DISCONTINUED | OUTPATIENT
Start: 2024-02-06 | End: 2024-02-06

## 2024-02-06 RX ORDER — CALCIUM ACETATE 667 MG/1
1 CAPSULE ORAL
Status: DISCONTINUED | OUTPATIENT
Start: 2024-02-07 | End: 2024-02-09

## 2024-02-06 RX ORDER — HEPARIN SODIUM 5000 [USP'U]/ML
5000 INJECTION, SOLUTION INTRAVENOUS; SUBCUTANEOUS EVERY 8 HOURS SCHEDULED
Status: DISCONTINUED | OUTPATIENT
Start: 2024-02-06 | End: 2024-02-12 | Stop reason: HOSPADM

## 2024-02-06 RX ORDER — INSULIN LISPRO 100 [IU]/ML
0-4 INJECTION, SOLUTION INTRAVENOUS; SUBCUTANEOUS
Status: DISCONTINUED | OUTPATIENT
Start: 2024-02-07 | End: 2024-02-12 | Stop reason: HOSPADM

## 2024-02-06 RX ORDER — ONDANSETRON 2 MG/ML
4 INJECTION INTRAMUSCULAR; INTRAVENOUS EVERY 6 HOURS PRN
Status: DISCONTINUED | OUTPATIENT
Start: 2024-02-06 | End: 2024-02-12 | Stop reason: HOSPADM

## 2024-02-06 RX ORDER — INSULIN GLARGINE 100 [IU]/ML
5 INJECTION, SOLUTION SUBCUTANEOUS NIGHTLY
Status: DISCONTINUED | OUTPATIENT
Start: 2024-02-06 | End: 2024-02-12 | Stop reason: HOSPADM

## 2024-02-06 RX ORDER — OXYCODONE HYDROCHLORIDE AND ACETAMINOPHEN 5; 325 MG/1; MG/1
2 TABLET ORAL ONCE
Status: COMPLETED | OUTPATIENT
Start: 2024-02-06 | End: 2024-02-06

## 2024-02-06 RX ORDER — SODIUM CHLORIDE 0.9 % (FLUSH) 0.9 %
5-40 SYRINGE (ML) INJECTION PRN
Status: DISCONTINUED | OUTPATIENT
Start: 2024-02-06 | End: 2024-02-12 | Stop reason: HOSPADM

## 2024-02-06 RX ORDER — DEXTROSE MONOHYDRATE 100 MG/ML
INJECTION, SOLUTION INTRAVENOUS CONTINUOUS PRN
Status: DISCONTINUED | OUTPATIENT
Start: 2024-02-06 | End: 2024-02-12 | Stop reason: HOSPADM

## 2024-02-06 RX ORDER — GLUCAGON 1 MG/ML
1 KIT INJECTION PRN
Status: DISCONTINUED | OUTPATIENT
Start: 2024-02-06 | End: 2024-02-12 | Stop reason: HOSPADM

## 2024-02-06 RX ORDER — ONDANSETRON 4 MG/1
4 TABLET, ORALLY DISINTEGRATING ORAL EVERY 8 HOURS PRN
Status: DISCONTINUED | OUTPATIENT
Start: 2024-02-06 | End: 2024-02-12 | Stop reason: HOSPADM

## 2024-02-06 RX ORDER — SODIUM CHLORIDE 0.9 % (FLUSH) 0.9 %
5-40 SYRINGE (ML) INJECTION EVERY 12 HOURS SCHEDULED
Status: DISCONTINUED | OUTPATIENT
Start: 2024-02-06 | End: 2024-02-12 | Stop reason: HOSPADM

## 2024-02-06 RX ORDER — SODIUM CHLORIDE, SODIUM LACTATE, POTASSIUM CHLORIDE, AND CALCIUM CHLORIDE .6; .31; .03; .02 G/100ML; G/100ML; G/100ML; G/100ML
500 INJECTION, SOLUTION INTRAVENOUS ONCE
Status: COMPLETED | OUTPATIENT
Start: 2024-02-06 | End: 2024-02-06

## 2024-02-06 RX ORDER — INSULIN LISPRO 100 [IU]/ML
0-4 INJECTION, SOLUTION INTRAVENOUS; SUBCUTANEOUS NIGHTLY
Status: DISCONTINUED | OUTPATIENT
Start: 2024-02-06 | End: 2024-02-12 | Stop reason: HOSPADM

## 2024-02-06 RX ORDER — HYDROCODONE BITARTRATE AND ACETAMINOPHEN 7.5; 325 MG/1; MG/1
1 TABLET ORAL EVERY 6 HOURS PRN
Status: DISCONTINUED | OUTPATIENT
Start: 2024-02-06 | End: 2024-02-10

## 2024-02-06 RX ORDER — OXYCODONE HYDROCHLORIDE 5 MG/1
5 TABLET ORAL EVERY 6 HOURS PRN
Status: DISCONTINUED | OUTPATIENT
Start: 2024-02-06 | End: 2024-02-10

## 2024-02-06 RX ORDER — ACETAMINOPHEN 650 MG/1
650 SUPPOSITORY RECTAL EVERY 6 HOURS PRN
Status: DISCONTINUED | OUTPATIENT
Start: 2024-02-06 | End: 2024-02-06

## 2024-02-06 RX ORDER — ASPIRIN 81 MG/1
81 TABLET, CHEWABLE ORAL DAILY
Status: DISCONTINUED | OUTPATIENT
Start: 2024-02-07 | End: 2024-02-12 | Stop reason: HOSPADM

## 2024-02-06 RX ORDER — HYDRALAZINE HYDROCHLORIDE 25 MG/1
25 TABLET, FILM COATED ORAL EVERY 8 HOURS SCHEDULED
Status: DISCONTINUED | OUTPATIENT
Start: 2024-02-06 | End: 2024-02-12 | Stop reason: HOSPADM

## 2024-02-06 RX ORDER — AMLODIPINE BESYLATE 5 MG/1
5 TABLET ORAL DAILY
Status: DISCONTINUED | OUTPATIENT
Start: 2024-02-07 | End: 2024-02-12 | Stop reason: HOSPADM

## 2024-02-06 RX ORDER — SODIUM CHLORIDE 9 MG/ML
INJECTION, SOLUTION INTRAVENOUS PRN
Status: DISCONTINUED | OUTPATIENT
Start: 2024-02-06 | End: 2024-02-12 | Stop reason: HOSPADM

## 2024-02-06 RX ORDER — METRONIDAZOLE 500 MG/100ML
500 INJECTION, SOLUTION INTRAVENOUS ONCE
Status: COMPLETED | OUTPATIENT
Start: 2024-02-06 | End: 2024-02-06

## 2024-02-06 RX ORDER — SODIUM CHLORIDE, SODIUM LACTATE, POTASSIUM CHLORIDE, AND CALCIUM CHLORIDE .6; .31; .03; .02 G/100ML; G/100ML; G/100ML; G/100ML
1000 INJECTION, SOLUTION INTRAVENOUS
Status: DISCONTINUED | OUTPATIENT
Start: 2024-02-06 | End: 2024-02-06

## 2024-02-06 RX ADMIN — CEFEPIME 2000 MG: 2 INJECTION, POWDER, FOR SOLUTION INTRAVENOUS at 17:01

## 2024-02-06 RX ADMIN — OXYCODONE HYDROCHLORIDE 5 MG: 5 TABLET ORAL at 23:27

## 2024-02-06 RX ADMIN — VANCOMYCIN HYDROCHLORIDE 2000 MG: 5 INJECTION, POWDER, LYOPHILIZED, FOR SOLUTION INTRAVENOUS at 23:20

## 2024-02-06 RX ADMIN — SODIUM CHLORIDE: 9 INJECTION, SOLUTION INTRAVENOUS at 22:47

## 2024-02-06 RX ADMIN — HEPARIN SODIUM 5000 UNITS: 5000 INJECTION INTRAVENOUS; SUBCUTANEOUS at 22:49

## 2024-02-06 RX ADMIN — OXYCODONE AND ACETAMINOPHEN 2 TABLET: 5; 325 TABLET ORAL at 18:46

## 2024-02-06 RX ADMIN — METRONIDAZOLE 500 MG: 500 INJECTION, SOLUTION INTRAVENOUS at 17:01

## 2024-02-06 RX ADMIN — SODIUM CHLORIDE, PRESERVATIVE FREE 10 ML: 5 INJECTION INTRAVENOUS at 22:58

## 2024-02-06 RX ADMIN — SODIUM CHLORIDE, POTASSIUM CHLORIDE, SODIUM LACTATE AND CALCIUM CHLORIDE 500 ML: 600; 310; 30; 20 INJECTION, SOLUTION INTRAVENOUS at 20:39

## 2024-02-06 RX ADMIN — MEROPENEM 1000 MG: 1 INJECTION, POWDER, FOR SOLUTION INTRAVENOUS at 22:48

## 2024-02-06 RX ADMIN — INSULIN GLARGINE 5 UNITS: 100 INJECTION, SOLUTION SUBCUTANEOUS at 22:49

## 2024-02-06 NOTE — ED PROVIDER NOTES
Emergency Department Encounter    Patient: Yovanny Levine  MRN: [id number]  : 1975  Date of Evaluation: 2024  ED Provider:  Hali Franco DO    Triage Chief Complaint:   Leg Injury (Left leg amputation a few months ago, stating there is an infection now, pain is worsening ), Nausea, and Diarrhea (Since yesterday)    [place]:  Yovanny Levine is a 48 y.o. male with a history of type 2 diabetes, anxiety, depression, peripheral autonomic neuropathy diabetic neuropathy diabetic nephropathy hyperlipidemia coronary artery disease diabetic ulcers of the left foot, end-stage renal disease on hemodialysis that presents to the emergency department for left foot infection.  Patient stating he had his left great toe amputated months ago by general surgeon Dr. Hsieh.looks patient states he is getting wound care home health coming every other day to change the dressing to the wound.  He states that he noticed some new ulcers on the lateral aspect of his left foot and were concerned for infection and told him to come to the emergency department for evaluation.  Patient states he has been having some pain and tenderness in his left leg and swelling.  Patient states pain 7 out of 10 on the pain scale.  Patient states he is on chronic home Percocets for pain.  Patient denies any falls injuries or trauma to the left lower extremity and foot.  Patient states he is also noted some nausea some diarrhea some chills for the last couple days.  He states no fever.  He denies any chest pain shortness breath abdominal pain no dysuria hematuria no runny nose earache.  Patient states he did get his dialysis on yesterday and slept all day yesterday after dialysis.  Patient here for evaluation.    ROS - see HPI, below listed is current ROS at time of my eval:  General:  No fevers, no chills, no weakness  Eyes:  No recent vison changes, no discharge  ENT:  No sore throat, no nasal congestion, no hearing changes  Cardiovascular:

## 2024-02-06 NOTE — H&P
History and Physical      Name:  Yovanyn Levine /Age/Sex: 1975  (48 y.o. male)   MRN & CSN:  [id number] & [id number] Encounter Date/Time: 2024 6:49 PM EST   Location:  [address] [place] [address] PCP: José Luis Izquierdo MD       Assessment and Plan:   Yovanny Levine is a 48 y.o. male with T2DM complicated by L. Foot ulcer, ESRD on HD, CAD, HTN, HLD, Depression/anxiety, chronic pain, anemia of chronic disease presented from home with worsening L. Foot pain.    Sepsis POA secondary to L. Foot cellulitis   Chronic L. Foot diabetic ulcer  XR foot soft tissue ulceration and gas plantar to 5th metatarsal head and base  Sepsis fluid bolus avoided due to ESRD  .8/Procal 1.43  Based on prior Cx, started on IV Vancomycin and IV Meropenem   Follow up wound and blood Cx  Local wound care    ESRD on HD (MWF)  LUE AV fistula   Nephrology for inpatient management of HD    T2DM with hyperglycemia  Basal and sliding scale insulin  Hypoglycemia protocol     Watery Diarrhea  + leukocytosis and risk of C. Difficile   Check C.difficile     Anemia of chronic disease  Hb 9.6  Stable Hb trend, no evidence of overt bleeding    Hypertension  Continue Amlodipine and Hydralazine    Chronic pain with opioid dependence   Resume home [place] as needed, PDMP reviewed    Inpatient, [facility]   Resuscitation/code status decision was based on a thorough discussion with the patient.  The code status was made Full Code No additional code details.    Disposition:   Current Living situation: Home   Expected Disposition: Home  Estimated D/C: 2-3 days    Diet ADULT DIET; Regular; 4 carb choices (60 gm/meal)   DVT Prophylaxis [] Lovenox, [x]  Heparin, [] SCDs, [] Ambulation,  [] Eliquis, [] Xarelto, [] Coumadin   Code Status Full Code   Surrogate Decision Maker/ POA [place]     Personally reviewed Lab Studies and Imaging   Discussed management of the case with ER provider who recommended admission for sepsis 2/2 L. Foot cellulitis  Drugs that require

## 2024-02-07 LAB
25(OH)D3 SERPL-MCNC: 6.79 NG/ML
ALBUMIN SERPL-MCNC: 3.2 GM/DL (ref 3.4–5)
ALP BLD-CCNC: 70 IU/L (ref 40–128)
ALT SERPL-CCNC: 7 U/L (ref 10–40)
ANION GAP SERPL CALCULATED.3IONS-SCNC: 19 MMOL/L (ref 7–16)
AST SERPL-CCNC: 8 IU/L (ref 15–37)
BASOPHILS ABSOLUTE: 0 K/CU MM
BASOPHILS RELATIVE PERCENT: 0.3 % (ref 0–1)
BILIRUB SERPL-MCNC: 0.4 MG/DL (ref 0–1)
BUN SERPL-MCNC: 73 MG/DL (ref 6–23)
CALCIUM IONIZED: 3.84 MG/DL (ref 4.48–5.28)
CALCIUM SERPL-MCNC: 6.5 MG/DL (ref 8.3–10.6)
CHLORIDE BLD-SCNC: 90 MMOL/L (ref 99–110)
CO2: 22 MMOL/L (ref 21–32)
CREAT SERPL-MCNC: 9.2 MG/DL (ref 0.9–1.3)
DIFFERENTIAL TYPE: ABNORMAL
DOSE AMOUNT: NORMAL
DOSE TIME: NORMAL
EOSINOPHILS ABSOLUTE: 0.1 K/CU MM
EOSINOPHILS RELATIVE PERCENT: 0.4 % (ref 0–3)
GFR SERPL CREATININE-BSD FRML MDRD: 6 ML/MIN/1.73M2
GLUCOSE BLD-MCNC: 130 MG/DL (ref 70–99)
GLUCOSE BLD-MCNC: 164 MG/DL (ref 70–99)
GLUCOSE BLD-MCNC: 203 MG/DL (ref 70–99)
GLUCOSE BLD-MCNC: 225 MG/DL (ref 70–99)
GLUCOSE SERPL-MCNC: 133 MG/DL (ref 70–99)
HCT VFR BLD CALC: 27.2 % (ref 42–52)
HEMOGLOBIN: 8.7 GM/DL (ref 13.5–18)
IMMATURE NEUTROPHIL %: 0.6 % (ref 0–0.43)
IONIZED CA: 0.96 MMOL/L (ref 1.12–1.32)
LACTATE: 0.8 MMOL/L (ref 0.5–1.9)
LYMPHOCYTES ABSOLUTE: 0.6 K/CU MM
LYMPHOCYTES RELATIVE PERCENT: 3.5 % (ref 24–44)
MCH RBC QN AUTO: 30 PG (ref 27–31)
MCHC RBC AUTO-ENTMCNC: 32 % (ref 32–36)
MCV RBC AUTO: 93.8 FL (ref 78–100)
MONOCYTES ABSOLUTE: 1 K/CU MM
MONOCYTES RELATIVE PERCENT: 6.5 % (ref 0–4)
MRSA, DNA, NASAL: NEGATIVE
NUCLEATED RBC %: 0 %
PDW BLD-RTO: 15.6 % (ref 11.7–14.9)
PLATELET # BLD: 214 K/CU MM (ref 140–440)
PMV BLD AUTO: 9.5 FL (ref 7.5–11.1)
POTASSIUM SERPL-SCNC: 4.4 MMOL/L (ref 3.5–5.1)
RBC # BLD: 2.9 M/CU MM (ref 4.6–6.2)
SEGMENTED NEUTROPHILS ABSOLUTE COUNT: 14 K/CU MM
SEGMENTED NEUTROPHILS RELATIVE PERCENT: 88.7 % (ref 36–66)
SODIUM BLD-SCNC: 131 MMOL/L (ref 135–145)
SPECIMEN DESCRIPTION: NORMAL
TOTAL IMMATURE NEUTOROPHIL: 0.09 K/CU MM
TOTAL NUCLEATED RBC: 0 K/CU MM
TOTAL PROTEIN: 5.8 GM/DL (ref 6.4–8.2)
VANCOMYCIN RANDOM: 19.7 UG/ML
WBC # BLD: 15.8 K/CU MM (ref 4–10.5)

## 2024-02-07 PROCEDURE — 6370000000 HC RX 637 (ALT 250 FOR IP): Performed by: INTERNAL MEDICINE

## 2024-02-07 PROCEDURE — 6370000000 HC RX 637 (ALT 250 FOR IP): Performed by: STUDENT IN AN ORGANIZED HEALTH CARE EDUCATION/TRAINING PROGRAM

## 2024-02-07 PROCEDURE — 1200000000 HC SEMI PRIVATE

## 2024-02-07 PROCEDURE — 85025 COMPLETE CBC W/AUTO DIFF WBC: CPT

## 2024-02-07 PROCEDURE — 5A1D70Z PERFORMANCE OF URINARY FILTRATION, INTERMITTENT, LESS THAN 6 HOURS PER DAY: ICD-10-PCS | Performed by: INTERNAL MEDICINE

## 2024-02-07 PROCEDURE — 2580000003 HC RX 258: Performed by: STUDENT IN AN ORGANIZED HEALTH CARE EDUCATION/TRAINING PROGRAM

## 2024-02-07 PROCEDURE — 87076 CULTURE ANAEROBE IDENT EACH: CPT

## 2024-02-07 PROCEDURE — 87077 CULTURE AEROBIC IDENTIFY: CPT

## 2024-02-07 PROCEDURE — 6360000002 HC RX W HCPCS: Performed by: STUDENT IN AN ORGANIZED HEALTH CARE EDUCATION/TRAINING PROGRAM

## 2024-02-07 PROCEDURE — 87075 CULTR BACTERIA EXCEPT BLOOD: CPT

## 2024-02-07 PROCEDURE — 80053 COMPREHEN METABOLIC PANEL: CPT

## 2024-02-07 PROCEDURE — 99999 PR OFFICE/OUTPT VISIT,PROCEDURE ONLY: CPT | Performed by: NURSE PRACTITIONER

## 2024-02-07 PROCEDURE — 6360000002 HC RX W HCPCS: Performed by: FAMILY MEDICINE

## 2024-02-07 PROCEDURE — 82306 VITAMIN D 25 HYDROXY: CPT

## 2024-02-07 PROCEDURE — 90935 HEMODIALYSIS ONE EVALUATION: CPT

## 2024-02-07 PROCEDURE — 82962 GLUCOSE BLOOD TEST: CPT

## 2024-02-07 PROCEDURE — 2500000003 HC RX 250 WO HCPCS: Performed by: STUDENT IN AN ORGANIZED HEALTH CARE EDUCATION/TRAINING PROGRAM

## 2024-02-07 PROCEDURE — 36415 COLL VENOUS BLD VENIPUNCTURE: CPT

## 2024-02-07 PROCEDURE — 82330 ASSAY OF CALCIUM: CPT

## 2024-02-07 PROCEDURE — 99223 1ST HOSP IP/OBS HIGH 75: CPT | Performed by: INTERNAL MEDICINE

## 2024-02-07 PROCEDURE — 80202 ASSAY OF VANCOMYCIN: CPT

## 2024-02-07 PROCEDURE — 87641 MR-STAPH DNA AMP PROBE: CPT

## 2024-02-07 PROCEDURE — 83605 ASSAY OF LACTIC ACID: CPT

## 2024-02-07 PROCEDURE — 94761 N-INVAS EAR/PLS OXIMETRY MLT: CPT

## 2024-02-07 PROCEDURE — 87186 SC STD MICRODIL/AGAR DIL: CPT

## 2024-02-07 PROCEDURE — 87070 CULTURE OTHR SPECIMN AEROBIC: CPT

## 2024-02-07 RX ORDER — CALCIUM GLUCONATE 20 MG/ML
2000 INJECTION, SOLUTION INTRAVENOUS ONCE
Status: COMPLETED | OUTPATIENT
Start: 2024-02-07 | End: 2024-02-07

## 2024-02-07 RX ORDER — CALCITRIOL 0.25 UG/1
0.5 CAPSULE, LIQUID FILLED ORAL 2 TIMES DAILY
Status: DISCONTINUED | OUTPATIENT
Start: 2024-02-07 | End: 2024-02-12 | Stop reason: HOSPADM

## 2024-02-07 RX ORDER — ERGOCALCIFEROL 1.25 MG/1
50000 CAPSULE ORAL
Status: DISCONTINUED | OUTPATIENT
Start: 2024-02-07 | End: 2024-02-12 | Stop reason: HOSPADM

## 2024-02-07 RX ADMIN — ERGOCALCIFEROL 50000 UNITS: 1.25 CAPSULE ORAL at 13:13

## 2024-02-07 RX ADMIN — HEPARIN SODIUM 5000 UNITS: 5000 INJECTION INTRAVENOUS; SUBCUTANEOUS at 15:07

## 2024-02-07 RX ADMIN — SODIUM CHLORIDE, PRESERVATIVE FREE 10 ML: 5 INJECTION INTRAVENOUS at 08:03

## 2024-02-07 RX ADMIN — OXYCODONE HYDROCHLORIDE 5 MG: 5 TABLET ORAL at 15:53

## 2024-02-07 RX ADMIN — SODIUM CHLORIDE, PRESERVATIVE FREE 10 ML: 5 INJECTION INTRAVENOUS at 21:15

## 2024-02-07 RX ADMIN — CALCIUM ACETATE 667 MG: 667 CAPSULE ORAL at 17:14

## 2024-02-07 RX ADMIN — INSULIN LISPRO 1 UNITS: 100 INJECTION, SOLUTION INTRAVENOUS; SUBCUTANEOUS at 17:14

## 2024-02-07 RX ADMIN — CALCITRIOL CAPSULES 0.25 MCG 0.5 MCG: 0.25 CAPSULE ORAL at 08:05

## 2024-02-07 RX ADMIN — VANCOMYCIN HYDROCHLORIDE 1500 MG: 1.5 INJECTION, POWDER, LYOPHILIZED, FOR SOLUTION INTRAVENOUS at 15:52

## 2024-02-07 RX ADMIN — HYDROCODONE BITARTRATE AND ACETAMINOPHEN 1 TABLET: 7.5; 325 TABLET ORAL at 05:42

## 2024-02-07 RX ADMIN — HYDRALAZINE HYDROCHLORIDE 25 MG: 25 TABLET ORAL at 21:14

## 2024-02-07 RX ADMIN — CALCIUM ACETATE 667 MG: 667 CAPSULE ORAL at 08:05

## 2024-02-07 RX ADMIN — HYDRALAZINE HYDROCHLORIDE 25 MG: 25 TABLET ORAL at 05:48

## 2024-02-07 RX ADMIN — HEPARIN SODIUM 5000 UNITS: 5000 INJECTION INTRAVENOUS; SUBCUTANEOUS at 21:14

## 2024-02-07 RX ADMIN — ONDANSETRON 4 MG: 2 INJECTION INTRAMUSCULAR; INTRAVENOUS at 08:02

## 2024-02-07 RX ADMIN — INSULIN GLARGINE 5 UNITS: 100 INJECTION, SOLUTION SUBCUTANEOUS at 21:24

## 2024-02-07 RX ADMIN — CALCIUM GLUCONATE 2000 MG: 20 INJECTION, SOLUTION INTRAVENOUS at 14:47

## 2024-02-07 RX ADMIN — CALCITRIOL CAPSULES 0.25 MCG 0.5 MCG: 0.25 CAPSULE ORAL at 21:14

## 2024-02-07 RX ADMIN — HEPARIN SODIUM 5000 UNITS: 5000 INJECTION INTRAVENOUS; SUBCUTANEOUS at 05:42

## 2024-02-07 RX ADMIN — HYDROCODONE BITARTRATE AND ACETAMINOPHEN 1 TABLET: 7.5; 325 TABLET ORAL at 13:17

## 2024-02-07 RX ADMIN — HYDRALAZINE HYDROCHLORIDE 25 MG: 25 TABLET ORAL at 15:07

## 2024-02-07 RX ADMIN — MEROPENEM 500 MG: 500 INJECTION, POWDER, FOR SOLUTION INTRAVENOUS at 21:55

## 2024-02-07 RX ADMIN — HYDROCODONE BITARTRATE AND ACETAMINOPHEN 1 TABLET: 7.5; 325 TABLET ORAL at 21:25

## 2024-02-07 RX ADMIN — ASPIRIN 81 MG: 81 TABLET, CHEWABLE ORAL at 08:05

## 2024-02-07 NOTE — ED NOTES
Lab called to start lactic  
Anxiety associated with depression     Back pain 07/02/2012    CAD (coronary artery disease) 2/10/2023    Chest pain 05/01/2013    Diabetic nephropathy (HCC)     Diabetic neuropathy (HCC) 12/22/2011    Diabetic ulcer of left midfoot associated with type 2 diabetes mellitus, with fat layer exposed (HCC) 07/18/2017    Diverticulosis     C scope + Dr. Medina    DM (diabetes mellitus), type 2 (HCC) 2002    DR. 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 Medina    Necrotizing fasciitis (HCC)     Nose fracture 1988    Panic attacks     Pericarditis 2003    Hospitalized with s/p heart cath normal    Peripheral autonomic neuropathy due to diabetes mellitus (HCC)     Axonal EMG- NCS, March 2011    Sebaceous cyst 09/01/2011    URI (upper respiratory infection) 02/27/2012    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 type 2 diabetes mellitus, with muscle involvement without evidence of necrosis (HCC) 9/21/2021    WD-Wound, surgical, infected, initial encounter 12/08/2017    Wrist fracture 1986       Assessment    Vitals:        Vitals:    02/06/24 1429 02/06/24 1635 02/06/24 1846   BP: (!) 163/106 (!) 156/88    Pulse: 97 88    Resp: 19 18 16   Temp: 97.5 °F (36.4 °C)     TempSrc: Oral     SpO2: 100% 100%      PO Status: Regular  O2 Flow Rate:      Cardiac Rhythm:   Last documented pain medication administered:   NIH Score: NIH     Active LDA's:   Peripheral IV 02/06/24 Right Antecubital (Active)       Pertinent or High Risk Medications/Drips: no   If Yes, please provide details:   Blood Product Administration: no  If Yes, please provide details:     Recommendation    Incomplete orders   Additional Comments:    If any further questions, please call Sending RN at ED    Electronically signed by:

## 2024-02-08 LAB
ANION GAP SERPL CALCULATED.3IONS-SCNC: 16 MMOL/L (ref 7–16)
BASOPHILS ABSOLUTE: 0 K/CU MM
BASOPHILS RELATIVE PERCENT: 0.2 % (ref 0–1)
BUN SERPL-MCNC: 52 MG/DL (ref 6–23)
CALCIUM IONIZED: 3.76 MG/DL (ref 4.48–5.28)
CALCIUM SERPL-MCNC: 7.4 MG/DL (ref 8.3–10.6)
CHLORIDE BLD-SCNC: 90 MMOL/L (ref 99–110)
CO2: 25 MMOL/L (ref 21–32)
CREAT SERPL-MCNC: 7 MG/DL (ref 0.9–1.3)
CRP SERPL HS-MCNC: 234.1 MG/L
DIFFERENTIAL TYPE: ABNORMAL
EOSINOPHILS ABSOLUTE: 0.2 K/CU MM
EOSINOPHILS RELATIVE PERCENT: 1.5 % (ref 0–3)
GFR SERPL CREATININE-BSD FRML MDRD: 9 ML/MIN/1.73M2
GLUCOSE BLD-MCNC: 135 MG/DL (ref 70–99)
GLUCOSE BLD-MCNC: 163 MG/DL (ref 70–99)
GLUCOSE BLD-MCNC: 211 MG/DL (ref 70–99)
GLUCOSE BLD-MCNC: 223 MG/DL (ref 70–99)
GLUCOSE SERPL-MCNC: 152 MG/DL (ref 70–99)
HCT VFR BLD CALC: 27.8 % (ref 42–52)
HEMOGLOBIN: 8.7 GM/DL (ref 13.5–18)
IMMATURE NEUTROPHIL %: 0.4 % (ref 0–0.43)
IONIZED CA: 0.94 MMOL/L (ref 1.12–1.32)
LYMPHOCYTES ABSOLUTE: 0.6 K/CU MM
LYMPHOCYTES RELATIVE PERCENT: 4.4 % (ref 24–44)
MCH RBC QN AUTO: 30 PG (ref 27–31)
MCHC RBC AUTO-ENTMCNC: 31.3 % (ref 32–36)
MCV RBC AUTO: 95.9 FL (ref 78–100)
MONOCYTES ABSOLUTE: 1 K/CU MM
MONOCYTES RELATIVE PERCENT: 7.6 % (ref 0–4)
NUCLEATED RBC %: 0 %
PDW BLD-RTO: 15.7 % (ref 11.7–14.9)
PLATELET # BLD: 207 K/CU MM (ref 140–440)
PMV BLD AUTO: 10.5 FL (ref 7.5–11.1)
POTASSIUM SERPL-SCNC: 4.3 MMOL/L (ref 3.5–5.1)
PROCALCITONIN SERPL-MCNC: 1.51 NG/ML
RBC # BLD: 2.9 M/CU MM (ref 4.6–6.2)
SEGMENTED NEUTROPHILS ABSOLUTE COUNT: 11.1 K/CU MM
SEGMENTED NEUTROPHILS RELATIVE PERCENT: 85.9 % (ref 36–66)
SODIUM BLD-SCNC: 131 MMOL/L (ref 135–145)
TOTAL IMMATURE NEUTOROPHIL: 0.05 K/CU MM
TOTAL NUCLEATED RBC: 0 K/CU MM
WBC # BLD: 12.9 K/CU MM (ref 4–10.5)

## 2024-02-08 PROCEDURE — 6360000002 HC RX W HCPCS: Performed by: STUDENT IN AN ORGANIZED HEALTH CARE EDUCATION/TRAINING PROGRAM

## 2024-02-08 PROCEDURE — 80048 BASIC METABOLIC PNL TOTAL CA: CPT

## 2024-02-08 PROCEDURE — 86140 C-REACTIVE PROTEIN: CPT

## 2024-02-08 PROCEDURE — 6370000000 HC RX 637 (ALT 250 FOR IP): Performed by: INTERNAL MEDICINE

## 2024-02-08 PROCEDURE — 94761 N-INVAS EAR/PLS OXIMETRY MLT: CPT

## 2024-02-08 PROCEDURE — 2580000003 HC RX 258: Performed by: STUDENT IN AN ORGANIZED HEALTH CARE EDUCATION/TRAINING PROGRAM

## 2024-02-08 PROCEDURE — 0KBW0ZZ EXCISION OF LEFT FOOT MUSCLE, OPEN APPROACH: ICD-10-PCS | Performed by: SURGERY

## 2024-02-08 PROCEDURE — 6370000000 HC RX 637 (ALT 250 FOR IP): Performed by: FAMILY MEDICINE

## 2024-02-08 PROCEDURE — 82330 ASSAY OF CALCIUM: CPT

## 2024-02-08 PROCEDURE — 84145 PROCALCITONIN (PCT): CPT

## 2024-02-08 PROCEDURE — 6370000000 HC RX 637 (ALT 250 FOR IP): Performed by: STUDENT IN AN ORGANIZED HEALTH CARE EDUCATION/TRAINING PROGRAM

## 2024-02-08 PROCEDURE — 2500000003 HC RX 250 WO HCPCS: Performed by: STUDENT IN AN ORGANIZED HEALTH CARE EDUCATION/TRAINING PROGRAM

## 2024-02-08 PROCEDURE — 82962 GLUCOSE BLOOD TEST: CPT

## 2024-02-08 PROCEDURE — 36415 COLL VENOUS BLD VENIPUNCTURE: CPT

## 2024-02-08 PROCEDURE — 1200000000 HC SEMI PRIVATE

## 2024-02-08 PROCEDURE — 99233 SBSQ HOSP IP/OBS HIGH 50: CPT | Performed by: NURSE PRACTITIONER

## 2024-02-08 PROCEDURE — 85025 COMPLETE CBC W/AUTO DIFF WBC: CPT

## 2024-02-08 RX ADMIN — MEROPENEM 500 MG: 500 INJECTION, POWDER, FOR SOLUTION INTRAVENOUS at 21:38

## 2024-02-08 RX ADMIN — INSULIN GLARGINE 5 UNITS: 100 INJECTION, SOLUTION SUBCUTANEOUS at 21:29

## 2024-02-08 RX ADMIN — SODIUM CHLORIDE, PRESERVATIVE FREE 10 ML: 5 INJECTION INTRAVENOUS at 21:29

## 2024-02-08 RX ADMIN — OXYCODONE HYDROCHLORIDE 5 MG: 5 TABLET ORAL at 22:57

## 2024-02-08 RX ADMIN — CALCIUM ACETATE 667 MG: 667 CAPSULE ORAL at 17:19

## 2024-02-08 RX ADMIN — HEPARIN SODIUM 5000 UNITS: 5000 INJECTION INTRAVENOUS; SUBCUTANEOUS at 21:28

## 2024-02-08 RX ADMIN — ONDANSETRON 4 MG: 4 TABLET, ORALLY DISINTEGRATING ORAL at 04:25

## 2024-02-08 RX ADMIN — ASPIRIN 81 MG: 81 TABLET, CHEWABLE ORAL at 11:01

## 2024-02-08 RX ADMIN — CALCITRIOL CAPSULES 0.25 MCG 0.5 MCG: 0.25 CAPSULE ORAL at 21:28

## 2024-02-08 RX ADMIN — CALCIUM ACETATE 667 MG: 667 CAPSULE ORAL at 11:01

## 2024-02-08 RX ADMIN — SODIUM CHLORIDE, PRESERVATIVE FREE 10 ML: 5 INJECTION INTRAVENOUS at 11:02

## 2024-02-08 RX ADMIN — HEPARIN SODIUM 5000 UNITS: 5000 INJECTION INTRAVENOUS; SUBCUTANEOUS at 14:58

## 2024-02-08 RX ADMIN — HEPARIN SODIUM 5000 UNITS: 5000 INJECTION INTRAVENOUS; SUBCUTANEOUS at 06:05

## 2024-02-08 RX ADMIN — HYDRALAZINE HYDROCHLORIDE 25 MG: 25 TABLET ORAL at 21:28

## 2024-02-08 RX ADMIN — AMLODIPINE BESYLATE 5 MG: 5 TABLET ORAL at 11:01

## 2024-02-08 RX ADMIN — ONDANSETRON 4 MG: 4 TABLET, ORALLY DISINTEGRATING ORAL at 22:57

## 2024-02-08 RX ADMIN — HYDROCODONE BITARTRATE AND ACETAMINOPHEN 1 TABLET: 7.5; 325 TABLET ORAL at 16:43

## 2024-02-08 RX ADMIN — HYDRALAZINE HYDROCHLORIDE 25 MG: 25 TABLET ORAL at 06:05

## 2024-02-08 RX ADMIN — HYDROCODONE BITARTRATE AND ACETAMINOPHEN 1 TABLET: 7.5; 325 TABLET ORAL at 04:25

## 2024-02-08 RX ADMIN — HYDROCODONE BITARTRATE AND ACETAMINOPHEN 1 TABLET: 7.5; 325 TABLET ORAL at 11:05

## 2024-02-08 RX ADMIN — CALCITRIOL CAPSULES 0.25 MCG 0.5 MCG: 0.25 CAPSULE ORAL at 11:01

## 2024-02-08 RX ADMIN — HYDRALAZINE HYDROCHLORIDE 25 MG: 25 TABLET ORAL at 14:58

## 2024-02-08 NOTE — CARE COORDINATION
CM spoke with pt in room. Pt is from home with his roommate. They are friends, but not close. Friend does not help with wound care or ADLs. The pt is indp of his ADLs. Uses cane PRN and shower chair. Pt lives in 2 story house. Pt's bedroom is on the first floor with a half bath, but the shower is on the 2nd floor. Pt struggles with the stairs. The pt has a PCP and ins. Pt does HD MWF on [address] at 11:45a. Has done dialysis for 1 year and has an upcoming appointment for a kidney transplant eval. Pt has 20$/month worth of food stamps and is on disability pay.     Pt does not drive and has issues with transportation - states this is the main reason why he misses many of his appointments. He currently has rides with his insurance, but states that he is running out of rides. CM placed transportation info on pt's AVS. Pt is aware.     Pt plans on returning home at discharge. Will require transportation.       02/08/24 8667   Service Assessment   Patient Orientation Alert and Oriented   Cognition Alert   History Provided By Patient;Medical Record   Primary Caregiver Self   Support Systems None   Patient's Healthcare Decision Maker is: Legal Next of Kin   PCP Verified by CM Yes   Last Visit to PCP Within last 3 months  (phone appointment a few days ago per pt)   Prior Functional Level Independent in ADLs/IADLs   Current Functional Level Independent in ADLs/IADLs   Can patient return to prior living arrangement Yes   Ability to make needs known: Good   Family able to assist with home care needs: No   Would you like for me to discuss the discharge plan with any other family members/significant others, and if so, who? No   Financial Resources [place];Medicare  (20$/month food stamps)   Community Resources None;Transportation   Social/Functional History   Lives With Other (comment)  (roommate)   Type of Home House   Home Layout Two level  (Bedroom on main level, shower/tub updstairs)   Bathroom Shower/Tub

## 2024-02-08 NOTE — DISCHARGE INSTRUCTIONS
What is Dial-A-Ride:  Origin to Destination service within 3/4 mile of the established fixed route service area by appointment for all citizens.  Dial-A-Ride Fare  $ 4.00  To schedule a Dial-A-Ride Trip:  Call [phone], Option 1. If you call after hours, weekends or on a holiday, a voice message system is available. Appointments can be scheduled the day prior to trip. Same day service may be available, if possible. Hearing impaired individuals should call the kwiry at [phone].  To cancel a Dial-A-Ride Trip:  Call at least one hour prior to the scheduled trip or the trip will be considered a “No Show”.  Transportation Department of Jobs and Family Services  Program Provides Short-Term Assistance  The Transportation Program assists qualifying residents of [facility] with transportation necessary for work-related activities or medical appointments.    The program provides bus tickets, gas cards and point-to-point transportation through a van service. All transportation services require a 5-business-day notice. For more information please call [phone].    Medical transportation services:  Are limited to persons approved for Medicaid  Are limited to appointments for a Medicaid-covered service  Work-related transportation services:  Are subject to financial guidelines  Might include a fee          [employer]  [phone]    [employer]    [phone] or [phone]      [employer]    [phone]      [employer]    [phone]      [employer]    [phone]      [facility] [employer]   [phone]

## 2024-02-08 NOTE — PLAN OF CARE
Problem: Safety - Adult  Goal: Free from fall injury  2/8/2024 1530 by Tere Pérez RN  Outcome: Progressing  2/8/2024 0507 by Morelia Wood RN  Outcome: Progressing     Problem: ABCDS Injury Assessment  Goal: Absence of physical injury  2/8/2024 1530 by Tere Pérez RN  Outcome: Progressing  2/8/2024 0507 by Morelia Wood RN  Outcome: Progressing     Problem: Discharge Planning  Goal: Discharge to home or other facility with appropriate resources  2/8/2024 1530 by Tere Pérez RN  Outcome: Progressing  2/8/2024 0507 by Morelia Wood RN  Outcome: Progressing     Problem: Chronic Conditions and Co-morbidities  Goal: Patient's chronic conditions and co-morbidity symptoms are monitored and maintained or improved  2/8/2024 1530 by Tere Pérez RN  Outcome: Progressing  2/8/2024 0507 by Morelia Wood RN  Outcome: Progressing     Problem: Pain  Goal: Verbalizes/displays adequate comfort level or baseline comfort level  2/8/2024 1530 by Tere Pérez RN  Outcome: Progressing  2/8/2024 0507 by Morelia Wood RN  Outcome: Progressing

## 2024-02-09 ENCOUNTER — APPOINTMENT (OUTPATIENT)
Dept: MRI IMAGING | Age: 49
DRG: 853 | End: 2024-02-09
Payer: MEDICARE

## 2024-02-09 LAB
ANION GAP SERPL CALCULATED.3IONS-SCNC: 18 MMOL/L (ref 7–16)
BASOPHILS ABSOLUTE: 0.1 K/CU MM
BASOPHILS RELATIVE PERCENT: 0.3 % (ref 0–1)
BUN SERPL-MCNC: 74 MG/DL (ref 6–23)
CALCIUM IONIZED: 3.44 MG/DL (ref 4.48–5.28)
CALCIUM SERPL-MCNC: 6.5 MG/DL (ref 8.3–10.6)
CHLORIDE BLD-SCNC: 92 MMOL/L (ref 99–110)
CO2: 26 MMOL/L (ref 21–32)
CREAT SERPL-MCNC: 8.5 MG/DL (ref 0.9–1.3)
CRP SERPL HS-MCNC: 195.8 MG/L
DIFFERENTIAL TYPE: ABNORMAL
DOSE AMOUNT: NORMAL
DOSE TIME: NORMAL
EOSINOPHILS ABSOLUTE: 0.2 K/CU MM
EOSINOPHILS RELATIVE PERCENT: 1.3 % (ref 0–3)
GFR SERPL CREATININE-BSD FRML MDRD: 7 ML/MIN/1.73M2
GLUCOSE BLD-MCNC: 165 MG/DL (ref 70–99)
GLUCOSE BLD-MCNC: 185 MG/DL (ref 70–99)
GLUCOSE BLD-MCNC: 237 MG/DL (ref 70–99)
GLUCOSE SERPL-MCNC: 170 MG/DL (ref 70–99)
HCT VFR BLD CALC: 26 % (ref 42–52)
HEMOGLOBIN: 8.2 GM/DL (ref 13.5–18)
IMMATURE NEUTROPHIL %: 0.6 % (ref 0–0.43)
IONIZED CA: 0.86 MMOL/L (ref 1.12–1.32)
LYMPHOCYTES ABSOLUTE: 0.6 K/CU MM
LYMPHOCYTES RELATIVE PERCENT: 4.2 % (ref 24–44)
MCH RBC QN AUTO: 29.8 PG (ref 27–31)
MCHC RBC AUTO-ENTMCNC: 31.5 % (ref 32–36)
MCV RBC AUTO: 94.5 FL (ref 78–100)
MONOCYTES ABSOLUTE: 1 K/CU MM
MONOCYTES RELATIVE PERCENT: 6.7 % (ref 0–4)
NUCLEATED RBC %: 0 %
PDW BLD-RTO: 15.5 % (ref 11.7–14.9)
PLATELET # BLD: 214 K/CU MM (ref 140–440)
PMV BLD AUTO: 10 FL (ref 7.5–11.1)
POTASSIUM SERPL-SCNC: 4.1 MMOL/L (ref 3.5–5.1)
RBC # BLD: 2.75 M/CU MM (ref 4.6–6.2)
SEGMENTED NEUTROPHILS ABSOLUTE COUNT: 12.6 K/CU MM
SEGMENTED NEUTROPHILS RELATIVE PERCENT: 86.9 % (ref 36–66)
SODIUM BLD-SCNC: 136 MMOL/L (ref 135–145)
TOTAL IMMATURE NEUTOROPHIL: 0.09 K/CU MM
TOTAL NUCLEATED RBC: 0 K/CU MM
VANCOMYCIN RANDOM: 22.1 UG/ML
WBC # BLD: 14.6 K/CU MM (ref 4–10.5)

## 2024-02-09 PROCEDURE — 73718 MRI LOWER EXTREMITY W/O DYE: CPT

## 2024-02-09 PROCEDURE — 6370000000 HC RX 637 (ALT 250 FOR IP): Performed by: STUDENT IN AN ORGANIZED HEALTH CARE EDUCATION/TRAINING PROGRAM

## 2024-02-09 PROCEDURE — 82330 ASSAY OF CALCIUM: CPT

## 2024-02-09 PROCEDURE — 6360000002 HC RX W HCPCS: Performed by: INTERNAL MEDICINE

## 2024-02-09 PROCEDURE — 2500000003 HC RX 250 WO HCPCS: Performed by: INTERNAL MEDICINE

## 2024-02-09 PROCEDURE — 90935 HEMODIALYSIS ONE EVALUATION: CPT

## 2024-02-09 PROCEDURE — 82962 GLUCOSE BLOOD TEST: CPT

## 2024-02-09 PROCEDURE — 6370000000 HC RX 637 (ALT 250 FOR IP): Performed by: INTERNAL MEDICINE

## 2024-02-09 PROCEDURE — 94761 N-INVAS EAR/PLS OXIMETRY MLT: CPT

## 2024-02-09 PROCEDURE — 1200000000 HC SEMI PRIVATE

## 2024-02-09 PROCEDURE — 36415 COLL VENOUS BLD VENIPUNCTURE: CPT

## 2024-02-09 PROCEDURE — 2580000003 HC RX 258: Performed by: STUDENT IN AN ORGANIZED HEALTH CARE EDUCATION/TRAINING PROGRAM

## 2024-02-09 PROCEDURE — 99233 SBSQ HOSP IP/OBS HIGH 50: CPT | Performed by: NURSE PRACTITIONER

## 2024-02-09 PROCEDURE — 80202 ASSAY OF VANCOMYCIN: CPT

## 2024-02-09 PROCEDURE — 80048 BASIC METABOLIC PNL TOTAL CA: CPT

## 2024-02-09 PROCEDURE — 86140 C-REACTIVE PROTEIN: CPT

## 2024-02-09 PROCEDURE — 6360000002 HC RX W HCPCS: Performed by: STUDENT IN AN ORGANIZED HEALTH CARE EDUCATION/TRAINING PROGRAM

## 2024-02-09 PROCEDURE — 85025 COMPLETE CBC W/AUTO DIFF WBC: CPT

## 2024-02-09 PROCEDURE — 6360000002 HC RX W HCPCS: Performed by: FAMILY MEDICINE

## 2024-02-09 RX ORDER — CALCIUM GLUCONATE 20 MG/ML
2000 INJECTION, SOLUTION INTRAVENOUS ONCE
Status: COMPLETED | OUTPATIENT
Start: 2024-02-09 | End: 2024-02-09

## 2024-02-09 RX ORDER — CALCIUM ACETATE 667 MG/1
2 CAPSULE ORAL
Status: DISCONTINUED | OUTPATIENT
Start: 2024-02-09 | End: 2024-02-12 | Stop reason: HOSPADM

## 2024-02-09 RX ADMIN — HYDRALAZINE HYDROCHLORIDE 25 MG: 25 TABLET ORAL at 14:23

## 2024-02-09 RX ADMIN — CALCIUM ACETATE 1334 MG: 667 CAPSULE ORAL at 17:52

## 2024-02-09 RX ADMIN — HEPARIN SODIUM 5000 UNITS: 5000 INJECTION INTRAVENOUS; SUBCUTANEOUS at 06:31

## 2024-02-09 RX ADMIN — INSULIN LISPRO 1 UNITS: 100 INJECTION, SOLUTION INTRAVENOUS; SUBCUTANEOUS at 17:52

## 2024-02-09 RX ADMIN — AMLODIPINE BESYLATE 5 MG: 5 TABLET ORAL at 14:05

## 2024-02-09 RX ADMIN — HYDROCODONE BITARTRATE AND ACETAMINOPHEN 1 TABLET: 7.5; 325 TABLET ORAL at 14:05

## 2024-02-09 RX ADMIN — EPOETIN ALFA-EPBX 10000 UNITS: 10000 INJECTION, SOLUTION INTRAVENOUS; SUBCUTANEOUS at 09:01

## 2024-02-09 RX ADMIN — SODIUM CHLORIDE 25 ML: 9 INJECTION, SOLUTION INTRAVENOUS at 21:39

## 2024-02-09 RX ADMIN — HEPARIN SODIUM 5000 UNITS: 5000 INJECTION INTRAVENOUS; SUBCUTANEOUS at 14:23

## 2024-02-09 RX ADMIN — CALCIUM ACETATE 1334 MG: 667 CAPSULE ORAL at 14:01

## 2024-02-09 RX ADMIN — SODIUM CHLORIDE, PRESERVATIVE FREE 10 ML: 5 INJECTION INTRAVENOUS at 20:17

## 2024-02-09 RX ADMIN — HYDRALAZINE HYDROCHLORIDE 25 MG: 25 TABLET ORAL at 06:30

## 2024-02-09 RX ADMIN — CALCITRIOL CAPSULES 0.25 MCG 0.5 MCG: 0.25 CAPSULE ORAL at 14:05

## 2024-02-09 RX ADMIN — OXYCODONE HYDROCHLORIDE 5 MG: 5 TABLET ORAL at 17:52

## 2024-02-09 RX ADMIN — SODIUM CHLORIDE, PRESERVATIVE FREE 10 ML: 5 INJECTION INTRAVENOUS at 14:07

## 2024-02-09 RX ADMIN — CALCITRIOL CAPSULES 0.25 MCG 0.5 MCG: 0.25 CAPSULE ORAL at 20:17

## 2024-02-09 RX ADMIN — INSULIN GLARGINE 5 UNITS: 100 INJECTION, SOLUTION SUBCUTANEOUS at 20:18

## 2024-02-09 RX ADMIN — ASPIRIN 81 MG: 81 TABLET, CHEWABLE ORAL at 14:05

## 2024-02-09 RX ADMIN — HYDROCODONE BITARTRATE AND ACETAMINOPHEN 1 TABLET: 7.5; 325 TABLET ORAL at 03:29

## 2024-02-09 RX ADMIN — HYDRALAZINE HYDROCHLORIDE 25 MG: 25 TABLET ORAL at 21:37

## 2024-02-09 RX ADMIN — HYDROCODONE BITARTRATE AND ACETAMINOPHEN 1 TABLET: 7.5; 325 TABLET ORAL at 20:22

## 2024-02-09 RX ADMIN — MEROPENEM 500 MG: 500 INJECTION, POWDER, FOR SOLUTION INTRAVENOUS at 21:40

## 2024-02-09 RX ADMIN — CALCIUM GLUCONATE 2000 MG: 20 INJECTION, SOLUTION INTRAVENOUS at 03:35

## 2024-02-09 RX ADMIN — HEPARIN SODIUM 5000 UNITS: 5000 INJECTION INTRAVENOUS; SUBCUTANEOUS at 21:37

## 2024-02-10 LAB
ANION GAP SERPL CALCULATED.3IONS-SCNC: 18 MMOL/L (ref 7–16)
BASOPHILS ABSOLUTE: 0.1 K/CU MM
BASOPHILS RELATIVE PERCENT: 0.4 % (ref 0–1)
BUN SERPL-MCNC: 55 MG/DL (ref 6–23)
CALCIUM IONIZED: 4.28 MG/DL (ref 4.48–5.28)
CALCIUM SERPL-MCNC: 8.1 MG/DL (ref 8.3–10.6)
CHLORIDE BLD-SCNC: 93 MMOL/L (ref 99–110)
CO2: 24 MMOL/L (ref 21–32)
CREAT SERPL-MCNC: 6.8 MG/DL (ref 0.9–1.3)
CRP SERPL HS-MCNC: 157.2 MG/L
CULTURE: ABNORMAL
DIFFERENTIAL TYPE: ABNORMAL
EOSINOPHILS ABSOLUTE: 0.2 K/CU MM
EOSINOPHILS RELATIVE PERCENT: 1.9 % (ref 0–3)
GFR SERPL CREATININE-BSD FRML MDRD: 9 ML/MIN/1.73M2
GLUCOSE BLD-MCNC: 148 MG/DL (ref 70–99)
GLUCOSE BLD-MCNC: 156 MG/DL (ref 70–99)
GLUCOSE BLD-MCNC: 175 MG/DL (ref 70–99)
GLUCOSE BLD-MCNC: 206 MG/DL (ref 70–99)
GLUCOSE SERPL-MCNC: 154 MG/DL (ref 70–99)
HCT VFR BLD CALC: 26.4 % (ref 42–52)
IMMATURE NEUTROPHIL %: 0.9 % (ref 0–0.43)
IONIZED CA: 1.07 MMOL/L (ref 1.12–1.32)
LYMPHOCYTES ABSOLUTE: 0.9 K/CU MM
LYMPHOCYTES RELATIVE PERCENT: 7.3 % (ref 24–44)
Lab: ABNORMAL
MCH RBC QN AUTO: 29.4 PG (ref 27–31)
MCHC RBC AUTO-ENTMCNC: 31.4 % (ref 32–36)
MCV RBC AUTO: 93.6 FL (ref 78–100)
MONOCYTES ABSOLUTE: 1 K/CU MM
MONOCYTES RELATIVE PERCENT: 8.3 % (ref 0–4)
NUCLEATED RBC %: 0 %
PDW BLD-RTO: 15.2 % (ref 11.7–14.9)
PLATELET # BLD: 212 K/CU MM (ref 140–440)
PMV BLD AUTO: 9.5 FL (ref 7.5–11.1)
POTASSIUM SERPL-SCNC: 4.3 MMOL/L (ref 3.5–5.1)
RBC # BLD: 2.82 M/CU MM (ref 4.6–6.2)
SEGMENTED NEUTROPHILS ABSOLUTE COUNT: 10.1 K/CU MM
SEGMENTED NEUTROPHILS RELATIVE PERCENT: 81.2 % (ref 36–66)
SODIUM BLD-SCNC: 135 MMOL/L (ref 135–145)
SPECIMEN: ABNORMAL
TOTAL IMMATURE NEUTOROPHIL: 0.11 K/CU MM
TOTAL NUCLEATED RBC: 0 K/CU MM
WBC # BLD: 12.4 K/CU MM (ref 4–10.5)

## 2024-02-10 PROCEDURE — 6370000000 HC RX 637 (ALT 250 FOR IP): Performed by: INTERNAL MEDICINE

## 2024-02-10 PROCEDURE — 94761 N-INVAS EAR/PLS OXIMETRY MLT: CPT

## 2024-02-10 PROCEDURE — 6370000000 HC RX 637 (ALT 250 FOR IP): Performed by: SURGERY

## 2024-02-10 PROCEDURE — 82330 ASSAY OF CALCIUM: CPT

## 2024-02-10 PROCEDURE — 2500000003 HC RX 250 WO HCPCS: Performed by: INTERNAL MEDICINE

## 2024-02-10 PROCEDURE — 0KBW0ZZ EXCISION OF LEFT FOOT MUSCLE, OPEN APPROACH: ICD-10-PCS | Performed by: SURGERY

## 2024-02-10 PROCEDURE — 6370000000 HC RX 637 (ALT 250 FOR IP): Performed by: STUDENT IN AN ORGANIZED HEALTH CARE EDUCATION/TRAINING PROGRAM

## 2024-02-10 PROCEDURE — 85025 COMPLETE CBC W/AUTO DIFF WBC: CPT

## 2024-02-10 PROCEDURE — 2580000003 HC RX 258: Performed by: STUDENT IN AN ORGANIZED HEALTH CARE EDUCATION/TRAINING PROGRAM

## 2024-02-10 PROCEDURE — 1200000000 HC SEMI PRIVATE

## 2024-02-10 PROCEDURE — 36415 COLL VENOUS BLD VENIPUNCTURE: CPT

## 2024-02-10 PROCEDURE — 80048 BASIC METABOLIC PNL TOTAL CA: CPT

## 2024-02-10 PROCEDURE — 86140 C-REACTIVE PROTEIN: CPT

## 2024-02-10 PROCEDURE — 82962 GLUCOSE BLOOD TEST: CPT

## 2024-02-10 PROCEDURE — 6360000002 HC RX W HCPCS: Performed by: STUDENT IN AN ORGANIZED HEALTH CARE EDUCATION/TRAINING PROGRAM

## 2024-02-10 RX ORDER — OXYCODONE HYDROCHLORIDE AND ACETAMINOPHEN 5; 325 MG/1; MG/1
1 TABLET ORAL EVERY 6 HOURS PRN
Status: DISCONTINUED | OUTPATIENT
Start: 2024-02-10 | End: 2024-02-12 | Stop reason: HOSPADM

## 2024-02-10 RX ORDER — ACETAMINOPHEN 650 MG
TABLET, EXTENDED RELEASE ORAL PRN
Status: DISCONTINUED | OUTPATIENT
Start: 2024-02-10 | End: 2024-02-12 | Stop reason: HOSPADM

## 2024-02-10 RX ORDER — POLYETHYLENE GLYCOL 3350 17 G/17G
17 POWDER, FOR SOLUTION ORAL DAILY
Status: DISCONTINUED | OUTPATIENT
Start: 2024-02-10 | End: 2024-02-12 | Stop reason: HOSPADM

## 2024-02-10 RX ORDER — LACTOBACILLUS RHAMNOSUS GG 10B CELL
1 CAPSULE ORAL
Status: DISCONTINUED | OUTPATIENT
Start: 2024-02-10 | End: 2024-02-12 | Stop reason: HOSPADM

## 2024-02-10 RX ADMIN — CALCIUM ACETATE 1334 MG: 667 CAPSULE ORAL at 16:47

## 2024-02-10 RX ADMIN — SODIUM CHLORIDE, PRESERVATIVE FREE 10 ML: 5 INJECTION INTRAVENOUS at 08:44

## 2024-02-10 RX ADMIN — CALCITRIOL CAPSULES 0.25 MCG 0.5 MCG: 0.25 CAPSULE ORAL at 08:36

## 2024-02-10 RX ADMIN — ASPIRIN 81 MG: 81 TABLET, CHEWABLE ORAL at 08:37

## 2024-02-10 RX ADMIN — Medication 1 CAPSULE: at 11:44

## 2024-02-10 RX ADMIN — HEPARIN SODIUM 5000 UNITS: 5000 INJECTION INTRAVENOUS; SUBCUTANEOUS at 21:38

## 2024-02-10 RX ADMIN — POLYETHYLENE GLYCOL (3350) 17 G: 17 POWDER, FOR SOLUTION ORAL at 14:08

## 2024-02-10 RX ADMIN — SODIUM CHLORIDE, PRESERVATIVE FREE 10 ML: 5 INJECTION INTRAVENOUS at 20:25

## 2024-02-10 RX ADMIN — ERGOCALCIFEROL 50000 UNITS: 1.25 CAPSULE ORAL at 08:36

## 2024-02-10 RX ADMIN — AMLODIPINE BESYLATE 5 MG: 5 TABLET ORAL at 08:37

## 2024-02-10 RX ADMIN — HEPARIN SODIUM 5000 UNITS: 5000 INJECTION INTRAVENOUS; SUBCUTANEOUS at 05:45

## 2024-02-10 RX ADMIN — CALCITRIOL CAPSULES 0.25 MCG 0.5 MCG: 0.25 CAPSULE ORAL at 20:23

## 2024-02-10 RX ADMIN — MEROPENEM 500 MG: 500 INJECTION, POWDER, FOR SOLUTION INTRAVENOUS at 21:40

## 2024-02-10 RX ADMIN — OXYCODONE HYDROCHLORIDE AND ACETAMINOPHEN 1 TABLET: 5; 325 TABLET ORAL at 18:12

## 2024-02-10 RX ADMIN — CALCIUM ACETATE 1334 MG: 667 CAPSULE ORAL at 11:44

## 2024-02-10 RX ADMIN — HYDRALAZINE HYDROCHLORIDE 25 MG: 25 TABLET ORAL at 13:13

## 2024-02-10 RX ADMIN — INSULIN GLARGINE 5 UNITS: 100 INJECTION, SOLUTION SUBCUTANEOUS at 20:23

## 2024-02-10 RX ADMIN — HYDRALAZINE HYDROCHLORIDE 25 MG: 25 TABLET ORAL at 05:45

## 2024-02-10 RX ADMIN — INSULIN LISPRO 1 UNITS: 100 INJECTION, SOLUTION INTRAVENOUS; SUBCUTANEOUS at 16:47

## 2024-02-10 RX ADMIN — HYDRALAZINE HYDROCHLORIDE 25 MG: 25 TABLET ORAL at 21:38

## 2024-02-10 RX ADMIN — HEPARIN SODIUM 5000 UNITS: 5000 INJECTION INTRAVENOUS; SUBCUTANEOUS at 13:13

## 2024-02-10 RX ADMIN — HYDROCODONE BITARTRATE AND ACETAMINOPHEN 1 TABLET: 7.5; 325 TABLET ORAL at 05:48

## 2024-02-10 RX ADMIN — CALCIUM ACETATE 1334 MG: 667 CAPSULE ORAL at 08:37

## 2024-02-10 RX ADMIN — SODIUM CHLORIDE 25 ML: 9 INJECTION, SOLUTION INTRAVENOUS at 21:39

## 2024-02-10 RX ADMIN — OXYCODONE HYDROCHLORIDE AND ACETAMINOPHEN 1 TABLET: 5; 325 TABLET ORAL at 11:44

## 2024-02-11 LAB
ANION GAP SERPL CALCULATED.3IONS-SCNC: 22 MMOL/L (ref 7–16)
BASOPHILS ABSOLUTE: 0.1 K/CU MM
BASOPHILS RELATIVE PERCENT: 0.4 % (ref 0–1)
BUN SERPL-MCNC: 69 MG/DL (ref 6–23)
CALCIUM SERPL-MCNC: 8.9 MG/DL (ref 8.3–10.6)
CHLORIDE BLD-SCNC: 94 MMOL/L (ref 99–110)
CO2: 18 MMOL/L (ref 21–32)
CREAT SERPL-MCNC: 8.1 MG/DL (ref 0.9–1.3)
CULTURE: NORMAL
CULTURE: NORMAL
DIFFERENTIAL TYPE: ABNORMAL
EOSINOPHILS ABSOLUTE: 0.2 K/CU MM
EOSINOPHILS RELATIVE PERCENT: 2.1 % (ref 0–3)
GFR SERPL CREATININE-BSD FRML MDRD: 8 ML/MIN/1.73M2
GLUCOSE BLD-MCNC: 141 MG/DL (ref 70–99)
GLUCOSE BLD-MCNC: 173 MG/DL (ref 70–99)
GLUCOSE BLD-MCNC: 173 MG/DL (ref 70–99)
GLUCOSE BLD-MCNC: 195 MG/DL (ref 70–99)
GLUCOSE SERPL-MCNC: 159 MG/DL (ref 70–99)
HCT VFR BLD CALC: 25.3 % (ref 42–52)
HEMOGLOBIN: 7.8 GM/DL (ref 13.5–18)
IMMATURE NEUTROPHIL %: 0.7 % (ref 0–0.43)
LYMPHOCYTES ABSOLUTE: 1 K/CU MM
LYMPHOCYTES RELATIVE PERCENT: 8.8 % (ref 24–44)
Lab: NORMAL
Lab: NORMAL
MCH RBC QN AUTO: 29.8 PG (ref 27–31)
MCHC RBC AUTO-ENTMCNC: 30.8 % (ref 32–36)
MCV RBC AUTO: 96.6 FL (ref 78–100)
MONOCYTES ABSOLUTE: 0.9 K/CU MM
MONOCYTES RELATIVE PERCENT: 8.2 % (ref 0–4)
NUCLEATED RBC %: 0 %
PDW BLD-RTO: 15.2 % (ref 11.7–14.9)
PLATELET # BLD: 240 K/CU MM (ref 140–440)
PMV BLD AUTO: 9.5 FL (ref 7.5–11.1)
POTASSIUM SERPL-SCNC: 5.1 MMOL/L (ref 3.5–5.1)
RBC # BLD: 2.62 M/CU MM (ref 4.6–6.2)
SEGMENTED NEUTROPHILS ABSOLUTE COUNT: 8.9 K/CU MM
SEGMENTED NEUTROPHILS RELATIVE PERCENT: 79.8 % (ref 36–66)
SODIUM BLD-SCNC: 134 MMOL/L (ref 135–145)
SPECIMEN: NORMAL
SPECIMEN: NORMAL
TOTAL IMMATURE NEUTOROPHIL: 0.08 K/CU MM
TOTAL NUCLEATED RBC: 0 K/CU MM
WBC # BLD: 11.2 K/CU MM (ref 4–10.5)

## 2024-02-11 PROCEDURE — 82330 ASSAY OF CALCIUM: CPT

## 2024-02-11 PROCEDURE — 1200000000 HC SEMI PRIVATE

## 2024-02-11 PROCEDURE — 6370000000 HC RX 637 (ALT 250 FOR IP): Performed by: INTERNAL MEDICINE

## 2024-02-11 PROCEDURE — 6370000000 HC RX 637 (ALT 250 FOR IP): Performed by: SURGERY

## 2024-02-11 PROCEDURE — 85025 COMPLETE CBC W/AUTO DIFF WBC: CPT

## 2024-02-11 PROCEDURE — 80048 BASIC METABOLIC PNL TOTAL CA: CPT

## 2024-02-11 PROCEDURE — 82962 GLUCOSE BLOOD TEST: CPT

## 2024-02-11 PROCEDURE — 6360000002 HC RX W HCPCS: Performed by: STUDENT IN AN ORGANIZED HEALTH CARE EDUCATION/TRAINING PROGRAM

## 2024-02-11 PROCEDURE — 94761 N-INVAS EAR/PLS OXIMETRY MLT: CPT

## 2024-02-11 PROCEDURE — 6360000002 HC RX W HCPCS: Performed by: FAMILY MEDICINE

## 2024-02-11 PROCEDURE — 2500000003 HC RX 250 WO HCPCS: Performed by: INTERNAL MEDICINE

## 2024-02-11 PROCEDURE — 36415 COLL VENOUS BLD VENIPUNCTURE: CPT

## 2024-02-11 PROCEDURE — 6370000000 HC RX 637 (ALT 250 FOR IP): Performed by: STUDENT IN AN ORGANIZED HEALTH CARE EDUCATION/TRAINING PROGRAM

## 2024-02-11 PROCEDURE — 2580000003 HC RX 258: Performed by: STUDENT IN AN ORGANIZED HEALTH CARE EDUCATION/TRAINING PROGRAM

## 2024-02-11 RX ADMIN — SODIUM CHLORIDE, PRESERVATIVE FREE 10 ML: 5 INJECTION INTRAVENOUS at 22:49

## 2024-02-11 RX ADMIN — SODIUM CHLORIDE 25 ML: 9 INJECTION, SOLUTION INTRAVENOUS at 21:44

## 2024-02-11 RX ADMIN — ASPIRIN 81 MG: 81 TABLET, CHEWABLE ORAL at 08:03

## 2024-02-11 RX ADMIN — SODIUM CHLORIDE, PRESERVATIVE FREE 10 ML: 5 INJECTION INTRAVENOUS at 08:05

## 2024-02-11 RX ADMIN — MEROPENEM 500 MG: 500 INJECTION, POWDER, FOR SOLUTION INTRAVENOUS at 21:45

## 2024-02-11 RX ADMIN — HYDRALAZINE HYDROCHLORIDE 25 MG: 25 TABLET ORAL at 21:45

## 2024-02-11 RX ADMIN — OXYCODONE HYDROCHLORIDE AND ACETAMINOPHEN 1 TABLET: 5; 325 TABLET ORAL at 06:35

## 2024-02-11 RX ADMIN — OXYCODONE HYDROCHLORIDE AND ACETAMINOPHEN 1 TABLET: 5; 325 TABLET ORAL at 12:31

## 2024-02-11 RX ADMIN — Medication 1 CAPSULE: at 08:03

## 2024-02-11 RX ADMIN — CALCITRIOL CAPSULES 0.25 MCG 0.5 MCG: 0.25 CAPSULE ORAL at 08:03

## 2024-02-11 RX ADMIN — CALCIUM ACETATE 1334 MG: 667 CAPSULE ORAL at 12:31

## 2024-02-11 RX ADMIN — CALCIUM ACETATE 1334 MG: 667 CAPSULE ORAL at 08:03

## 2024-02-11 RX ADMIN — HEPARIN SODIUM 5000 UNITS: 5000 INJECTION INTRAVENOUS; SUBCUTANEOUS at 12:31

## 2024-02-11 RX ADMIN — HYDRALAZINE HYDROCHLORIDE 25 MG: 25 TABLET ORAL at 12:31

## 2024-02-11 RX ADMIN — HEPARIN SODIUM 5000 UNITS: 5000 INJECTION INTRAVENOUS; SUBCUTANEOUS at 05:33

## 2024-02-11 RX ADMIN — INSULIN GLARGINE 5 UNITS: 100 INJECTION, SOLUTION SUBCUTANEOUS at 20:55

## 2024-02-11 RX ADMIN — OXYCODONE HYDROCHLORIDE AND ACETAMINOPHEN 1 TABLET: 5; 325 TABLET ORAL at 00:26

## 2024-02-11 RX ADMIN — POLYETHYLENE GLYCOL (3350) 17 G: 17 POWDER, FOR SOLUTION ORAL at 08:03

## 2024-02-11 RX ADMIN — HEPARIN SODIUM 5000 UNITS: 5000 INJECTION INTRAVENOUS; SUBCUTANEOUS at 21:45

## 2024-02-11 RX ADMIN — OXYCODONE HYDROCHLORIDE AND ACETAMINOPHEN 1 TABLET: 5; 325 TABLET ORAL at 18:23

## 2024-02-11 RX ADMIN — AMLODIPINE BESYLATE 5 MG: 5 TABLET ORAL at 08:03

## 2024-02-11 RX ADMIN — CALCIUM ACETATE 1334 MG: 667 CAPSULE ORAL at 16:44

## 2024-02-11 RX ADMIN — ONDANSETRON 4 MG: 2 INJECTION INTRAMUSCULAR; INTRAVENOUS at 22:48

## 2024-02-11 RX ADMIN — HYDRALAZINE HYDROCHLORIDE 25 MG: 25 TABLET ORAL at 05:33

## 2024-02-11 RX ADMIN — CALCITRIOL CAPSULES 0.25 MCG 0.5 MCG: 0.25 CAPSULE ORAL at 20:55

## 2024-02-11 RX ADMIN — SODIUM CHLORIDE, PRESERVATIVE FREE 10 ML: 5 INJECTION INTRAVENOUS at 20:55

## 2024-02-11 NOTE — PLAN OF CARE
When patient discharged he must follow-up in the wound clinic with Dr. Webber (who he has seen before) or with Dr. Fuentes weekly. Home health care orders have been written for daily dressings until seen in the wound clinic. When acute infection resolves, wound care orders will be from his wound care team/clinic team.

## 2024-02-12 VITALS
OXYGEN SATURATION: 98 % | BODY MASS INDEX: 37.36 KG/M2 | TEMPERATURE: 97.3 F | SYSTOLIC BLOOD PRESSURE: 178 MMHG | WEIGHT: 252.2 LBS | HEIGHT: 69 IN | HEART RATE: 75 BPM | RESPIRATION RATE: 18 BRPM | DIASTOLIC BLOOD PRESSURE: 91 MMHG

## 2024-02-12 LAB
ANION GAP SERPL CALCULATED.3IONS-SCNC: 20 MMOL/L (ref 7–16)
BASOPHILS ABSOLUTE: 0 K/CU MM
BASOPHILS RELATIVE PERCENT: 0.3 % (ref 0–1)
BUN SERPL-MCNC: 83 MG/DL (ref 6–23)
CALCIUM IONIZED: 4.04 MG/DL (ref 4.48–5.28)
CALCIUM SERPL-MCNC: 8.6 MG/DL (ref 8.3–10.6)
CHLORIDE BLD-SCNC: 94 MMOL/L (ref 99–110)
CO2: 22 MMOL/L (ref 21–32)
CREAT SERPL-MCNC: 9.3 MG/DL (ref 0.9–1.3)
DIFFERENTIAL TYPE: ABNORMAL
EOSINOPHILS ABSOLUTE: 0.2 K/CU MM
EOSINOPHILS RELATIVE PERCENT: 1.4 % (ref 0–3)
GFR SERPL CREATININE-BSD FRML MDRD: 6 ML/MIN/1.73M2
GLUCOSE BLD-MCNC: 149 MG/DL (ref 70–99)
GLUCOSE BLD-MCNC: 235 MG/DL (ref 70–99)
GLUCOSE SERPL-MCNC: 169 MG/DL (ref 70–99)
HCT VFR BLD CALC: 27.4 % (ref 42–52)
HEMOGLOBIN: 8.7 GM/DL (ref 13.5–18)
IMMATURE NEUTROPHIL %: 1.1 % (ref 0–0.43)
IONIZED CA: 1.01 MMOL/L (ref 1.12–1.32)
LYMPHOCYTES ABSOLUTE: 0.7 K/CU MM
LYMPHOCYTES RELATIVE PERCENT: 5.8 % (ref 24–44)
MCH RBC QN AUTO: 29.7 PG (ref 27–31)
MCHC RBC AUTO-ENTMCNC: 31.8 % (ref 32–36)
MCV RBC AUTO: 93.5 FL (ref 78–100)
MONOCYTES ABSOLUTE: 0.7 K/CU MM
MONOCYTES RELATIVE PERCENT: 6 % (ref 0–4)
NUCLEATED RBC %: 0 %
PDW BLD-RTO: 15.1 % (ref 11.7–14.9)
PLATELET # BLD: 304 K/CU MM (ref 140–440)
PMV BLD AUTO: 9.4 FL (ref 7.5–11.1)
POTASSIUM SERPL-SCNC: 5.1 MMOL/L (ref 3.5–5.1)
RBC # BLD: 2.93 M/CU MM (ref 4.6–6.2)
SEGMENTED NEUTROPHILS ABSOLUTE COUNT: 10.2 K/CU MM
SEGMENTED NEUTROPHILS RELATIVE PERCENT: 85.4 % (ref 36–66)
SODIUM BLD-SCNC: 136 MMOL/L (ref 135–145)
TOTAL IMMATURE NEUTOROPHIL: 0.13 K/CU MM
TOTAL NUCLEATED RBC: 0 K/CU MM
WBC # BLD: 12 K/CU MM (ref 4–10.5)

## 2024-02-12 PROCEDURE — 97530 THERAPEUTIC ACTIVITIES: CPT

## 2024-02-12 PROCEDURE — 85025 COMPLETE CBC W/AUTO DIFF WBC: CPT

## 2024-02-12 PROCEDURE — 97161 PT EVAL LOW COMPLEX 20 MIN: CPT

## 2024-02-12 PROCEDURE — 94761 N-INVAS EAR/PLS OXIMETRY MLT: CPT

## 2024-02-12 PROCEDURE — 97165 OT EVAL LOW COMPLEX 30 MIN: CPT

## 2024-02-12 PROCEDURE — 80048 BASIC METABOLIC PNL TOTAL CA: CPT

## 2024-02-12 PROCEDURE — 6370000000 HC RX 637 (ALT 250 FOR IP): Performed by: INTERNAL MEDICINE

## 2024-02-12 PROCEDURE — 36415 COLL VENOUS BLD VENIPUNCTURE: CPT

## 2024-02-12 PROCEDURE — 6370000000 HC RX 637 (ALT 250 FOR IP): Performed by: STUDENT IN AN ORGANIZED HEALTH CARE EDUCATION/TRAINING PROGRAM

## 2024-02-12 PROCEDURE — 99233 SBSQ HOSP IP/OBS HIGH 50: CPT | Performed by: NURSE PRACTITIONER

## 2024-02-12 PROCEDURE — 6360000002 HC RX W HCPCS: Performed by: INTERNAL MEDICINE

## 2024-02-12 PROCEDURE — 90935 HEMODIALYSIS ONE EVALUATION: CPT

## 2024-02-12 PROCEDURE — 6370000000 HC RX 637 (ALT 250 FOR IP): Performed by: FAMILY MEDICINE

## 2024-02-12 PROCEDURE — 97116 GAIT TRAINING THERAPY: CPT

## 2024-02-12 PROCEDURE — 6370000000 HC RX 637 (ALT 250 FOR IP): Performed by: NURSE PRACTITIONER

## 2024-02-12 PROCEDURE — 6360000002 HC RX W HCPCS: Performed by: STUDENT IN AN ORGANIZED HEALTH CARE EDUCATION/TRAINING PROGRAM

## 2024-02-12 PROCEDURE — 82330 ASSAY OF CALCIUM: CPT

## 2024-02-12 PROCEDURE — 2500000003 HC RX 250 WO HCPCS: Performed by: INTERNAL MEDICINE

## 2024-02-12 PROCEDURE — 6370000000 HC RX 637 (ALT 250 FOR IP): Performed by: SURGERY

## 2024-02-12 PROCEDURE — 2580000003 HC RX 258: Performed by: STUDENT IN AN ORGANIZED HEALTH CARE EDUCATION/TRAINING PROGRAM

## 2024-02-12 RX ORDER — BISMUTH SUBSALICYLATE 262 MG/1
524 TABLET, CHEWABLE ORAL
Status: DISCONTINUED | OUTPATIENT
Start: 2024-02-12 | End: 2024-02-12 | Stop reason: HOSPADM

## 2024-02-12 RX ORDER — CARVEDILOL 25 MG/1
25 TABLET ORAL 2 TIMES DAILY WITH MEALS
Status: DISCONTINUED | OUTPATIENT
Start: 2024-02-12 | End: 2024-02-12

## 2024-02-12 RX ORDER — ERGOCALCIFEROL 1.25 MG/1
50000 CAPSULE ORAL WEEKLY
Qty: 5 CAPSULE | Refills: 0 | Status: CANCELLED | OUTPATIENT
Start: 2024-02-12 | End: 2024-04-01

## 2024-02-12 RX ORDER — CARVEDILOL 25 MG/1
25 TABLET ORAL 2 TIMES DAILY WITH MEALS
Status: DISCONTINUED | OUTPATIENT
Start: 2024-02-12 | End: 2024-02-12 | Stop reason: HOSPADM

## 2024-02-12 RX ORDER — AMOXICILLIN AND CLAVULANATE POTASSIUM 500; 125 MG/1; MG/1
1 TABLET, FILM COATED ORAL
Qty: 37 TABLET | Refills: 0 | Status: SHIPPED | OUTPATIENT
Start: 2024-02-12 | End: 2024-03-20

## 2024-02-12 RX ORDER — AMOXICILLIN AND CLAVULANATE POTASSIUM 500; 125 MG/1; MG/1
1 TABLET, FILM COATED ORAL
Status: DISCONTINUED | OUTPATIENT
Start: 2024-02-12 | End: 2024-02-12 | Stop reason: HOSPADM

## 2024-02-12 RX ORDER — BISMUTH SUBSALICYLATE 262 MG/1
524 TABLET, CHEWABLE ORAL
Status: DISCONTINUED | OUTPATIENT
Start: 2024-02-12 | End: 2024-02-12

## 2024-02-12 RX ORDER — ERGOCALCIFEROL 1.25 MG/1
CAPSULE ORAL
Qty: 10 CAPSULE | Refills: 0 | Status: SHIPPED | OUTPATIENT
Start: 2024-02-14

## 2024-02-12 RX ORDER — LACTOBACILLUS RHAMNOSUS GG 10B CELL
1 CAPSULE ORAL
Qty: 14 CAPSULE | Refills: 0 | Status: SHIPPED | OUTPATIENT
Start: 2024-02-13 | End: 2024-02-27

## 2024-02-12 RX ADMIN — BISMUTH SUBSALICYLATE 524 MG: 262 TABLET, CHEWABLE ORAL at 12:23

## 2024-02-12 RX ADMIN — CALCIUM ACETATE 1334 MG: 667 CAPSULE ORAL at 17:05

## 2024-02-12 RX ADMIN — SODIUM CHLORIDE, PRESERVATIVE FREE 10 ML: 5 INJECTION INTRAVENOUS at 12:27

## 2024-02-12 RX ADMIN — CALCITRIOL CAPSULES 0.25 MCG 0.5 MCG: 0.25 CAPSULE ORAL at 12:24

## 2024-02-12 RX ADMIN — HYDRALAZINE HYDROCHLORIDE 25 MG: 25 TABLET ORAL at 17:06

## 2024-02-12 RX ADMIN — Medication 1 CAPSULE: at 12:23

## 2024-02-12 RX ADMIN — INSULIN LISPRO 1 UNITS: 100 INJECTION, SOLUTION INTRAVENOUS; SUBCUTANEOUS at 17:07

## 2024-02-12 RX ADMIN — CALCIUM ACETATE 1334 MG: 667 CAPSULE ORAL at 12:24

## 2024-02-12 RX ADMIN — HYDRALAZINE HYDROCHLORIDE 25 MG: 25 TABLET ORAL at 05:56

## 2024-02-12 RX ADMIN — CARVEDILOL 25 MG: 25 TABLET, FILM COATED ORAL at 17:06

## 2024-02-12 RX ADMIN — OXYCODONE HYDROCHLORIDE AND ACETAMINOPHEN 1 TABLET: 5; 325 TABLET ORAL at 12:24

## 2024-02-12 RX ADMIN — EPOETIN ALFA-EPBX 10000 UNITS: 10000 INJECTION, SOLUTION INTRAVENOUS; SUBCUTANEOUS at 09:02

## 2024-02-12 RX ADMIN — ASPIRIN 81 MG: 81 TABLET, CHEWABLE ORAL at 12:24

## 2024-02-12 RX ADMIN — BISMUTH SUBSALICYLATE 524 MG: 262 TABLET, CHEWABLE ORAL at 04:19

## 2024-02-12 RX ADMIN — AMOXICILLIN AND CLAVULANATE POTASSIUM 1 TABLET: 500; 125 TABLET, FILM COATED ORAL at 17:06

## 2024-02-12 RX ADMIN — AMLODIPINE BESYLATE 5 MG: 5 TABLET ORAL at 12:24

## 2024-02-12 RX ADMIN — HEPARIN SODIUM 5000 UNITS: 5000 INJECTION INTRAVENOUS; SUBCUTANEOUS at 05:56

## 2024-02-12 RX ADMIN — OXYCODONE HYDROCHLORIDE AND ACETAMINOPHEN 1 TABLET: 5; 325 TABLET ORAL at 00:30

## 2024-02-12 NOTE — PROGRESS NOTES
V2.0  [facility] [place]      Name:  Yovanny Levine /Age/Sex: 1975  (48 y.o. male)   MRN & CSN:  [id number] & [id number] Encounter Date/Time: 2024 8:06 AM EST    Location:  [address] [place] [address] PCP: José Luis Izquierdo MD       Hospital Day: 4    Assessment and Plan:   Yovanny Levine is a 48 y.o. male with pmh of T2DM, L. Foot ulcer, ESRD on HD, CAD, HTN, HLD, Depression/anxiety, chronic pain, anemia of chronic disease  who presents with Cellulitis of left foot      Plan:  Sepsis POA secondary to L. Foot cellulitis   Chronic L. Foot diabetic ulcer  XR foot soft tissue ulceration and gas plantar to 5th metatarsal head and base  LA 2.4 on admission, subsequently normalized  Sepsis fluid bolus avoided due to ESRD  .8/Procal 1.43  Based on prior Cx, started on IV Vancomycin and IV Meropenem   Follow up blood Cx, NGTD  wound Cx growing Proteus species and Enterococcus faecalis, sensitivities to follow  Local wound care  ID consulted, appreciate recs, left foot MRI ordered to evaluate for OM, pending  General surgery consulted, patient underwent incision and drainage of left foot , necrotic tissue noted.     ESRD on HD (MWF)  LUE AV fistula   Nephrology for inpatient management of HD     T2DM with hyperglycemia  Basal and sliding scale insulin  Hypoglycemia protocol      Watery Diarrhea  + leukocytosis and risk of C. Difficile   Check C.difficile, pending      Anemia of chronic disease  Hb 9.6  Stable Hb trend, no evidence of overt bleeding     Hypertension  Continue Amlodipine and Hydralazine     Chronic pain with opioid dependence   Resume home [place] as needed, PDMP reviewed    Diet ADULT DIET; Regular; 4 carb choices (60 gm/meal); No Added Salt (3-4 gm); 1500 ml   DVT Prophylaxis [] Lovenox, [x]  Heparin, [] SCDs, [] Ambulation,  [] Eliquis, [] Xarelto  [] Coumadin   Code Status Full Code   Disposition From: Home  Expected Disposition: Home  Estimated Date of Discharge: 2-3 days  Patient 
    V2.0  [facility] [place]      Name:  Yovanny Levine /Age/Sex: 1975  (48 y.o. male)   MRN & CSN:  [id number] & [id number] Encounter Date/Time: 2024 8:06 AM EST    Location:  [address] [place] [address] PCP: José Luis Izquierdo MD       Hospital Day: 3    Assessment and Plan:   Yovanny Levine is a 48 y.o. male with pmh of T2DM, L. Foot ulcer, ESRD on HD, CAD, HTN, HLD, Depression/anxiety, chronic pain, anemia of chronic disease  who presents with Cellulitis of left foot      Plan:  Sepsis POA secondary to L. Foot cellulitis   Chronic L. Foot diabetic ulcer  XR foot soft tissue ulceration and gas plantar to 5th metatarsal head and base  LA 2.4 on admission, subsequently normalized  Sepsis fluid bolus avoided due to ESRD  .8/Procal 1.43  Based on prior Cx, started on IV Vancomycin and IV Meropenem   Follow up wound and blood Cx, NGTD  Local wound care  ID consulted, appreciate recs, left foot MRI ordered to evaluate for OM  General surgery consulted, patient underwent incision and drainage of left foot , necrotic tissue noted.     ESRD on HD (MWF)  LUE AV fistula   Nephrology for inpatient management of HD     T2DM with hyperglycemia  Basal and sliding scale insulin  Hypoglycemia protocol      Watery Diarrhea  + leukocytosis and risk of C. Difficile   Check C.difficile, pending      Anemia of chronic disease  Hb 9.6  Stable Hb trend, no evidence of overt bleeding     Hypertension  Continue Amlodipine and Hydralazine     Chronic pain with opioid dependence   Resume home [place] as needed, PDMP reviewed    Diet ADULT DIET; Regular; 4 carb choices (60 gm/meal); No Added Salt (3-4 gm); 1500 ml   DVT Prophylaxis [] Lovenox, [x]  Heparin, [] SCDs, [] Ambulation,  [] Eliquis, [] Xarelto  [] Coumadin   Code Status Full Code   Disposition From: Home  Expected Disposition: Home  Estimated Date of Discharge: 2-3 days  Patient requires continued admission due to sepsis   Surrogate Decision Maker/ POA  
    V2.0  [facility] Hospitalist Progress Note      Name:  Yovanny Levine /Age/Sex: 1975  (48 y.o. male)   MRN & CSN:  [id number] & [id number] Encounter Date/Time: 2024 8:06 AM EST    Location:  [address] [place] [address] PCP: José Luis Izquierdo MD       Hospital Day: 7    Assessment and Plan:   Yovanny Levine is a 48 y.o. male with pmh of T2DM, L. Foot ulcer, ESRD on HD, CAD, HTN, HLD, Depression/anxiety, chronic pain, anemia of chronic disease  who presents with Cellulitis of left foot      Plan:  Sepsis POA secondary to L. Foot cellulitis   Chronic L. Foot diabetic ulcer  XR foot soft tissue ulceration and gas plantar to 5th metatarsal head and base  LA 2.4 on admission, subsequently normalized  Sepsis fluid bolus avoided due to ESRD  .8/Procal 1.43  Based on prior Cx, started on IV Vancomycin and IV Meropenem   Follow up blood Cx, NGTD  wound Cx growing Proteus species and Enterococcus faecalis, Abx deescalated to meropenem (Vanco d/c'd)  Local wound care  ID consulted, appreciate recs, left foot MRI confirms OM  General surgery consulted, patient underwent incision and drainage of left foot , necrotic tissue noted.  Repeat debridement done on 2/10.   Abx course as per ID,  PT OT consulted as patient has undergone 2 bedside debridements of his left foot, endorses instability while ambulating   Patient would likely need a tunneled PICC line for long-term antibiotics, final recs as per ID     ESRD on HD (MWF)  LUE AV fistula   Nephrology for inpatient management of HD     T2DM with hyperglycemia  Basal and sliding scale insulin  Hypoglycemia protocol      Watery Diarrhea-resolved  + leukocytosis and risk of C. Difficile   Check C.difficile, not done     Anemia of chronic disease  Hb 9.6  Stable Hb trend, no evidence of overt bleeding     Hypertension  Continue Amlodipine and Hydralazine     Chronic pain with opioid dependence   Resume home [place] as needed, PDMP reviewed    Diet ADULT DIET; Regular; 4 carb 
   02/09/24 1542   Encounter Summary   Encounter Overview/Reason  Initial Encounter   Service Provided For: Patient   Referral/Consult From: [facility]   Support System Spouse   Last Encounter  02/09/24  (PTdoing a MRI at time of visit,continue to provide support as needed.)   Complexity of Encounter Low   Begin Time 1538   End Time  1543   Total Time Calculated 5 min   Assessment/Intervention/Outcome   Assessment Unable to assess   Plan and Referrals   Plan/Referrals Continue Support (comment)       
4 Eyes Skin Assessment     NAME:  Yovanny Levine  YOB: 1975  MEDICAL RECORD NUMBER:  [id number]    The patient is being assessed for  Admission    I agree that at least one RN has performed a thorough Head to Toe Skin Assessment on the patient. ALL assessment sites listed below have been assessed.      Areas assessed by both nurses:    Head, Face, Ears, Shoulders, Back, Chest, Arms, Elbows, Hands, Sacrum. Buttock, Coccyx, Ischium, Legs. Feet and Heels, and Under Medical Devices   Grt toe and fourth toe amp previous dry eschar noted to lateral left foot area pink with scant amt of drainage noted. Fourth and fifth toe rt foot amp from previous noted. Faded bruising noted to LLE. Small scabs noted to LUE fistula site.     Does the Patient have a Wound? Yes wound(s) were present on assessment. LDA wound assessment was Initiated and completed by RN       Pradeep Prevention initiated by RN: No  Wound Care Orders initiated by RN: Yes    Pressure Injury (Stage 3,4, Unstageable, DTI, NWPT, and Complex wounds) if present, place Wound referral order by RN under : Yes    New Ostomies, if present place, Ostomy referral order under : No     Nurse 1 eSignature: Electronically signed by Nallely Dickson LPN on 2/6/24 at 8:05 PM EST    **SHARE this note so that the co-signing nurse can place an eSignature**    Nurse 2 eSignature: Electronically signed by Marina Morton RN on 2/6/24 at 9:48 PM EST   
Infectious Disease Progress Note  2024   Patient Name: Yovanny Levine : 1975   Impression  Left Foot Wounds, Cellulitis and Possible OM:  Acute Diarrhea: Resolved  Allergy/ Intolerance to doxycycline (N/V):  CrCl 12 with ESRD on HD, Afebrile, leukocytosis on the DWT, Pct 1.43, 1.51. , 234.1.   -BC 0/2 NGTD   -C.dif Pending  -MRSA by PCR negative  Past: 10/12/2023: MRI Foot Left WO contrast: Soft tissue wound/ulceration medially with underlying T2 heterogeneous signal which could reflect phlegmon/necrotic tissue.  No well-defined fluid collection.  Underlying osteomyelitis of the residual 1st metatarsal. 2. Marrow edema along the lateral border of the medial cuneiform and involving the intermediate cuneiform, proximal 2nd metatarsal, and 3rd metatarsal head with decreased T1 signal at the sites.  Osteomyelitis cannot be excluded.  Past: 2023: S/p per Dr. Fuentes: Excisional debridement of left foot.   -XR Left foot: Soft tissue ulcerations and gas plantar to the 5th metatarsal head and base and diffuse soft tissue swelling. No radiographic evidence of osteomyelitis.  -MRI Left foot WO contrast: Pending  -S/p per Dr. Doty: sharp excisional debridement of skin, subcutaneous tissue and muscle left foot.    Pt is not agreeable to any further amputation of foot/ toes, dw Dr. Doty.   ESRD on HD (MWF)/ Hyponatremia:  Dr. James onboard  DMII with Polyneuropathy:   Class II Obesity:  BMI: 37.07 kg/m2  Past Right and Left Toe Amputations:  Multi-morbidity: per PMHx:   Plan:  Continue IV meropenem 500 mg q24h  Continue IV vancomycin per pharmacy dosing, will await wound culture, noted MRSA screen negative  Trend CRP and Pct, ordered  Await wound culture left foot  Await C.dif  Await ordered left foot MRI to evaluate for OM-as the ulcerations have been present a questionable >3-4 weeks, as left foot XR showed no evidence of OM. This will determine the duration of therapy as the 
Meropenem dose updated to 500mg extended infusion every 24 hours (following the 1gm loading dose) per [facility] P&T protocol for HD dialysis patients.  Pharmacy will continue to follow.  
Nephrology Progress Note        [address] [facility], [address] [place] [address]  Phone: [phone]  Office Hours: 8:30AM - 4:30PM  Monday - Friday        2/10/2024 9:06 AM  Subjective:   Admit Date: 2/6/2024  PCP: José Luis Izquierdo MD  Interval History:   Doing well on room air    Diet: ADULT DIET; Regular; 4 carb choices (60 gm/meal); No Added Salt (3-4 gm); 1500 ml      Data:   Scheduled Meds:   lactobacillus  1 capsule Oral Daily with breakfast    calcium acetate  2 capsule Oral TID WC    epoetin paola-epbx  10,000 Units IntraVENous Once per day on Mon Wed Fri    calcitRIOL  0.5 mcg Oral BID    vitamin D  50,000 Units Oral Once per day on Wed Sat    sodium chloride flush  5-40 mL IntraVENous 2 times per day    heparin (porcine)  5,000 Units SubCUTAneous 3 times per day    insulin glargine  5 Units SubCUTAneous Nightly    insulin lispro  0-4 Units SubCUTAneous TID WC    insulin lispro  0-4 Units SubCUTAneous Nightly    meropenem  500 mg IntraVENous Q24H    amLODIPine  5 mg Oral Daily    aspirin  81 mg Oral Daily    hydrALAZINE  25 mg Oral 3 times per day     Continuous Infusions:   sodium chloride 25 mL (02/09/24 2139)    dextrose       PRN Meds:sodium chloride flush, sodium chloride, glucose, dextrose bolus **OR** dextrose bolus, glucagon (rDNA), dextrose, ondansetron **OR** ondansetron, HYDROcodone-acetaminophen  I/O last 3 completed shifts:  In: 1100 [P.O.:600]  Out: 3350 [Urine:350]  No intake/output data recorded.    Intake/Output Summary (Last 24 hours) at 2/10/2024 0906  Last data filed at 2/9/2024 2020  Gross per 24 hour   Intake 740 ml   Output 3125 ml   Net -2385 ml       CBC:   Recent Labs     02/08/24 0051 02/09/24  0039 02/10/24  0305   WBC 12.9* 14.6* 12.4*   HGB 8.7* 8.2* 8.3*    214 212       BMP:    Recent Labs     02/08/24 0051 02/09/24  0039 02/10/24  0305   * 136 135   K 4.3 4.1 4.3   CL 90* 92* 93*   CO2 25 26 24   BUN 52* 74* 55*   CREATININE 7.0* 8.5* 6.8* 
Nephrology Progress Note        [address] [facility], [address]  [place] [address]  Phone: [phone]  Office Hours: 8:30AM - 4:30PM  Monday - Friday 2/9/2024 7:10 AM  Subjective:   Admit Date: 2/6/2024  PCP: José Luis Izquierdo MD  Interval History:   On room air  Doing well  MRI foot pending    Diet: ADULT DIET; Regular; 4 carb choices (60 gm/meal); No Added Salt (3-4 gm); 1500 ml      Data:   Scheduled Meds:   calcium acetate  2 capsule Oral TID WC    calcitRIOL  0.5 mcg Oral BID    vitamin D  50,000 Units Oral Once per day on Wed Sat    sodium chloride flush  5-40 mL IntraVENous 2 times per day    heparin (porcine)  5,000 Units SubCUTAneous 3 times per day    insulin glargine  5 Units SubCUTAneous Nightly    insulin lispro  0-4 Units SubCUTAneous TID WC    insulin lispro  0-4 Units SubCUTAneous Nightly    vancomycin (VANCOCIN) intermittent dosing (placeholder)   Other RX Placeholder    meropenem  500 mg IntraVENous Q24H    amLODIPine  5 mg Oral Daily    aspirin  81 mg Oral Daily    hydrALAZINE  25 mg Oral 3 times per day     Continuous Infusions:   sodium chloride 25 mL/hr at 02/09/24 0334    dextrose       PRN Meds:sodium chloride flush, sodium chloride, glucose, dextrose bolus **OR** dextrose bolus, glucagon (rDNA), dextrose, oxyCODONE, ondansetron **OR** ondansetron, HYDROcodone-acetaminophen  I/O last 3 completed shifts:  In: 360 [P.O.:360]  Out: 575 [Urine:575]  No intake/output data recorded.    Intake/Output Summary (Last 24 hours) at 2/9/2024 0710  Last data filed at 2/8/2024 2302  Gross per 24 hour   Intake 360 ml   Output 575 ml   Net -215 ml       CBC:   Recent Labs     02/07/24  0512 02/08/24 0051 02/09/24  0039   WBC 15.8* 12.9* 14.6*   HGB 8.7* 8.7* 8.2*    207 214       BMP:    Recent Labs     02/07/24  0512 02/08/24  0051 02/09/24  0039   * 131* 136   K 4.4 4.3 4.1   CL 90* 90* 92*   CO2 22 25 26   BUN 73* 52* 74*   CREATININE 9.2* 7.0* 8.5*   GLUCOSE 133* 152* 
Nephrology Progress Note        [address] [facility], [address]  [place] [address]  Phone: [phone]  Office Hours: 8:30AM - 4:30PM  Monday - Friday 2/8/2024 7:49 AM  Subjective:   Admit Date: 2/6/2024  PCP: José Luis Izquierdo MD  Interval History:   Doing ok  Reports no BM since he came to the hospital    Diet: ADULT DIET; Regular; 4 carb choices (60 gm/meal); No Added Salt (3-4 gm); 1500 ml      Data:   Scheduled Meds:   calcitRIOL  0.5 mcg Oral BID    vitamin D  50,000 Units Oral Once per day on Wed Sat    sodium chloride flush  5-40 mL IntraVENous 2 times per day    heparin (porcine)  5,000 Units SubCUTAneous 3 times per day    insulin glargine  5 Units SubCUTAneous Nightly    insulin lispro  0-4 Units SubCUTAneous TID WC    insulin lispro  0-4 Units SubCUTAneous Nightly    vancomycin (VANCOCIN) intermittent dosing (placeholder)   Other RX Placeholder    meropenem  500 mg IntraVENous Q24H    amLODIPine  5 mg Oral Daily    aspirin  81 mg Oral Daily    calcium acetate  1 capsule Oral TID WC    hydrALAZINE  25 mg Oral 3 times per day     Continuous Infusions:   sodium chloride 25 mL/hr at 02/06/24 2247    dextrose       PRN Meds:sodium chloride flush, sodium chloride, glucose, dextrose bolus **OR** dextrose bolus, glucagon (rDNA), dextrose, oxyCODONE, ondansetron **OR** ondansetron, HYDROcodone-acetaminophen  I/O last 3 completed shifts:  In: 740 [P.O.:240]  Out: 3000   No intake/output data recorded.    Intake/Output Summary (Last 24 hours) at 2/8/2024 0749  Last data filed at 2/7/2024 1227  Gross per 24 hour   Intake 740 ml   Output 3000 ml   Net -2260 ml       CBC:   Recent Labs     02/06/24  1530 02/07/24  0512 02/08/24  0051   WBC 22.6* 15.8* 12.9*   HGB 9.6* 8.7* 8.7*    214 207       BMP:    Recent Labs     02/06/24  1530 02/07/24  0512 02/08/24  0051   * 131* 131*   K 4.2 4.4 4.3   CL 88* 90* 90*   CO2 24 22 25   BUN 62* 73* 52*   CREATININE 8.3* 9.2* 7.0*   GLUCOSE 229* 
Nephrology Progress Note        [address] [facility], [address]  [place] [address]  Phone: [phone]  Office Hours: 8:30AM - 4:30PM  Monday - Friday 2/12/2024 7:55 AM  Subjective:   Admit Date: 2/6/2024  PCP: José Luis Izquierdo MD  Interval History:   Doing ok  Had nausea last night after eating lasagna  Diet: ADULT DIET; Regular; 4 carb choices (60 gm/meal); No Added Salt (3-4 gm); 1500 ml      Data:   Scheduled Meds:   bismuth subsalicylate  524 mg Oral 4x Daily AC & HS    lactobacillus  1 capsule Oral Daily with breakfast    polyethylene glycol  17 g Oral Daily    calcium acetate  2 capsule Oral TID WC    epoetin paola-epbx  10,000 Units IntraVENous Once per day on Mon Wed Fri    calcitRIOL  0.5 mcg Oral BID    vitamin D  50,000 Units Oral Once per day on Wed Sat    sodium chloride flush  5-40 mL IntraVENous 2 times per day    heparin (porcine)  5,000 Units SubCUTAneous 3 times per day    insulin glargine  5 Units SubCUTAneous Nightly    insulin lispro  0-4 Units SubCUTAneous TID WC    insulin lispro  0-4 Units SubCUTAneous Nightly    meropenem  500 mg IntraVENous Q24H    amLODIPine  5 mg Oral Daily    aspirin  81 mg Oral Daily    hydrALAZINE  25 mg Oral 3 times per day     Continuous Infusions:   sodium chloride 25 mL (02/11/24 2144)    dextrose       PRN Meds:povidone-iodine, oxyCODONE-acetaminophen, sodium chloride flush, sodium chloride, glucose, dextrose bolus **OR** dextrose bolus, glucagon (rDNA), dextrose, ondansetron **OR** ondansetron  I/O last 3 completed shifts:  In: -   Out: 300 [Urine:300]  No intake/output data recorded.    Intake/Output Summary (Last 24 hours) at 2/12/2024 0755  Last data filed at 2/12/2024 0044  Gross per 24 hour   Intake --   Output 150 ml   Net -150 ml       CBC:   Recent Labs     02/10/24  0305 02/11/24  0412 02/12/24  0306   WBC 12.4* 11.2* 12.0*   HGB 8.3* 7.8* 8.7*    240 304       BMP:    Recent Labs     02/10/24  0305 02/11/24  0247 
Occupational Therapy  [facility] ACUTE CARE OCCUPATIONAL THERAPY EVALUATION  Yovanny Levine, 1975, 1123/1123-A, 2/12/2024    Discharge Recommendation: Home Health OT services    History  [place]:  The primary encounter diagnosis was Diabetic ulcer of left foot associated with type 2 diabetes mellitus, with muscle involvement without evidence of necrosis, unspecified part of foot (HCC). Diagnoses of Hyponatremia, ESRD on hemodialysis (HCC), Left foot infection, and Septicemia (HCC) were also pertinent to this visit.  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  Patient  has a past surgical history that includes Cardiac catheterization (2003, 2013); Tonsillectomy and adenoidectomy (1988); Upper gastrointestinal endoscopy (06/02/2011); Colonoscopy (06/02/2011); Nose surgery (1993); skin biopsy (N/A, 06/18/2013); other surgical history (Right, 05/22/2015); Toe amputation; Abdomen surgery; other surgical history (12/04/2017); pr incision bone cortex foot (Left, 09/22/2018); Upper 
Outpatient Pharmacy Progress Note for Meds-to-Beds    Total number of Prescriptions Filled: 1  The following medications were dispensed to the patient during the discharge process:  Augmentin     Additional Documentation:  Medications given to nurse   to provide to patient due to contact precautions   The following prescription(s) were not covered under the patient's insurance because they can be purchased as OTC medications: vitamin D and probiotic supplement       Thank you for letting us serve your patients.  [place] [id number]    Phone: [phone]    Fax: [phone]        
PHARMACY VANCOMYCIN MONITORING SERVICE  Pharmacy consulted by Miguel Angel Polo for monitoring and adjustment.    Indication for treatment: Vancomycin indication: SSTI with risk factors   Goal trough: 15-20 pre-HD  AUC/PAMELA: <500    Risk Factors for MRSA Identified:   Purulent and/or complicated SSTI    Pertinent Laboratory Values:   Temp Readings from Last 3 Encounters:   02/08/24 98.5 °F (36.9 °C) (Oral)   01/10/24 97.5 °F (36.4 °C)   11/21/23 96.9 °F (36.1 °C)     Recent Labs     02/06/24  1530 02/07/24  0512 02/08/24  0051   WBC 22.6* 15.8* 12.9*   LACTATE 2.4* 0.8  --      Recent Labs     02/06/24  1530 02/07/24  0512 02/08/24  0051   BUN 62* 73* 52*   CREATININE 8.3* 9.2* 7.0*     Estimated Creatinine Clearance: 16 mL/min (A) (based on SCr of 7 mg/dL (H)).  No intake or output data in the 24 hours ending 02/08/24 1409    Last Encounter Weight:  Wt Readings from Last 3 Encounters:   02/07/24 113.9 kg (251 lb)   01/10/24 114.3 kg (252 lb)   11/21/23 116.1 kg (256 lb)       Pertinent Cultures:   Date    Source    Results  02/06   Blood    Pending  2/7                              Wound (foot)                           Proteus penneri, Proteus vulgaris, Enterococcus faecalis       Vancomycin level:   TROUGH:  No results for input(s): \"VANCOTROUGH\" in the last 72 hours.  RANDOM:    Recent Labs     02/07/24  0512   VANCORANDOM 19.7       Assessment:  HPI: Left great toe amputation months ago and receiving home wound care.  New ulcers on left foot with pain.  ED notes reflects patient is on dialysis, last session on Monday.  2/7 - Pt received HD today, would culture obtained, ID consulted.  Interval History:   SCr, BUN, and urine output: ESRD on HD M/W/F, pt received HD this am  Day(s) of therapy:  3 of 5 (ID consulted)  Vancomycin concentration:   2/7 - 19.7, pre-HD level, OK to re-dose.    Plan:  Due to ESRD on HD, continue with intermittent vanco dosing based on levels.  Recheck the vanco level pre-HD on 
PHARMACY VANCOMYCIN MONITORING SERVICE  Pharmacy consulted by Miguel Angel Polo for monitoring and adjustment.    Indication for treatment: Vancomycin indication: SSTI with risk factors   Goal trough: Trough Goal: 10-15 mcg/mL  AUC/PAMELA: <500    Risk Factors for MRSA Identified:   Purulent and/or complicated SSTI    Pertinent Laboratory Values:   Temp Readings from Last 3 Encounters:   02/06/24 98.7 °F (37.1 °C) (Oral)   01/10/24 97.5 °F (36.4 °C)   11/21/23 96.9 °F (36.1 °C)     Recent Labs     02/06/24  1530   WBC 22.6*   LACTATE 2.4*     Recent Labs     02/06/24  1530   BUN 62*   CREATININE 8.3*     Estimated Creatinine Clearance: 13 mL/min (A) (based on SCr of 8.3 mg/dL (H)).  No intake or output data in the 24 hours ending 02/06/24 2147  Last Encounter Weight:  Wt Readings from Last 3 Encounters:   02/06/24 109.2 kg (240 lb 11.2 oz)   01/10/24 114.3 kg (252 lb)   11/21/23 116.1 kg (256 lb)       Pertinent Cultures:   Date    Source    Results  02/06   Blood    pending      Vancomycin level:   TROUGH:  No results for input(s): \"VANCOTROUGH\" in the last 72 hours.  RANDOM:  No results for input(s): \"VANCORANDOM\" in the last 72 hours.    Assessment:  HPI: Left great toe amputation months ago and receiving home wound care.  New ulcers on left foot with pain.  ED notes reflects patient is on dialysis, last session on Monday.  Interval History:   SCr, BUN, and urine output: Cr 8.3  Day(s) of therapy: Day 1  Vancomycin concentration: to be collected    Plan:  Current Vancomycin Dose: 2000 mg x 1 dose  Level to be determined by pharmacy clinical services once dialysis schedule is known.  Pharmacy will continue to monitor patient and adjust therapy as indicated      Thank you for the consult.  Yusef Dickson RPH  2/6/2024 9:47 PM    
Patient Name: Yovanny Levine  Patient : 1975  MRN: [id number]     Acct: [id number]  Date of Admission: 2024  Room/Bed: 1123/1123-A  Code Status:  Full Code  Allergies:   Allergies   Allergen Reactions    Adhesive Tape Rash    Doxycycline Nausea And Vomiting    Reglan [Metoclopramide] Anxiety     Diagnosis:    Patient Active Problem List   Diagnosis    Hiatal hernia    Diabetes mellitus type 2, improved controlled    Diabetic neuropathy (HCC)    Panic attack    Anxiety associated with depression    Neck pain    DANIELA (obstructive sleep apnea)    Urinary frequency    Bilateral carpal tunnel syndrome- left worse than right    Hyperlipidemia with target LDL less than 100    Essential hypertension    Bronchitis    Osteomyelitis (HCC)    Abscess    Periumbilical hernia    Sepsis (HCC)    Cellulitis of left foot    Constipation    Intractable nausea and vomiting    Uncontrolled type 2 diabetes mellitus    Nausea and vomiting    Diabetic foot infection (HCC)    Acute renal failure (ARF) (HCC)    Cellulitis    Cellulitis of left arm    Cellulitis, scrotum    Acute epididymitis    Irene gangrene    Recurrent major depressive disorder, in full remission (HCC)    Generalized anxiety disorder    Morbid obesity with body mass index (BMI) of 40.0 to 44.9 in adult (HCC)    Necrotizing fasciitis (HCC)    Syncope    Right groin wound    Ulcer of right groin with fat layer exposed (HCC)    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 type 2 diabetes mellitus, with muscle involvement without evidence of necrosis (HCC)    Nephrotic syndrome    Generalized edema    Stage 3b chronic kidney disease (HCC)    Diabetic nephropathy associated with type 2 diabetes mellitus (HCC)    Hypoalbuminemia    Chronic kidney disease, stage IV (severe) (HCC)    FH: CAD (coronary artery disease)    ASCVD (arteriosclerotic cardiovascular disease)    Other proteinuria    Chest pain    Surgical wound dehiscence    CARMEN (acute 
Patient just had a shower, he is doing well    PE:  Vitals:    02/09/24 2128 02/10/24 0321 02/10/24 0544 02/10/24 0830   BP: (!) 143/75 (!) 149/92 (!) 144/85 (!) 150/78   Pulse:  85 79 79   Resp:  18  20   Temp:  97.9 °F (36.6 °C)  98 °F (36.7 °C)   TempSrc:  Oral  Oral   SpO2: 93% 94%  93%   Weight:       Height:         Left foot:  dressing changed, no betadine or packing on wound, much  wound, pale coloration of tissue in wound bed, has some eschar and tissue necrosis, discussed with patient debride at bedside today and he is agreeable.    MRI results reviewed    A/P:  Patient is agreeable to debridement, he is not agreeable to any toe or foot amputation. He asked about pain medication as he takes percocet at home chronically, the norco isn't working as well. We discussed his home regimen of 7.5 mg BID versus 5mg q6h, in the acute hospitalization he would like to continue q6H and we will change to percocet.   He stated he has an evaluation for renal transplant. We discussed needing to clear any infection before he could be transplanted. He states his son has offered to donate a kidney to him.   I also discussed his MRI findings and the need for prolonged antibiotic to try and clear the osteomyelitis. I did discuss that at some point he may need to consider more surgery if the antibiotic doesn't clear the infection. Especially if he is hoping to be transplanted and on immunosuppression. I also specifically discussed the need for frequent treatment at the wound center. He has seen Dr Webber there and it does work in his schedule to see him.  
Pt needs to have MRI Questionnaire completed in order for pt to have MRI performed.  
RENAL DOSE ADJUSTMENT MADE PER P/T PROTOCOL    PREVIOUS ORDER:  Meropenem 1gm q8h    Estimated Creatinine Clearance: 13 mL/min (A) (based on SCr of 8.3 mg/dL (H)).  Recent Labs     02/06/24  1530   BUN 62*   CREATININE 8.3*        NEW RENALLY ADJUSTED ORDER:  Meropenem 1000mg over 30 minutes then Meropenem 500mg extended infusion Q12h per [facility] P&T protocol.      Yusef Dickson RPH  2/6/2024 8:34 PM     
Wound care eval for left foot patient is in dialysis will attempt at later time. Electronically signed by Rosie Salvador RN on 2/7/2024 at 11:54 AM    
days  Patient requires continued admission due to sepsis   Surrogate Decision Maker/ POA      Subjective:     Chief Complaint: Leg Injury (Left leg amputation a few months ago, stating there is an infection now, pain is worsening ), Nausea, and Diarrhea (Since yesterday)     Patient seen and evaluated at bedside.  No acute overnight events.  Patient seen comfortably resting in bed.  Denies any active complaints.  States pain is well-controlled.  Tolerating diet.  Patient endorses constipation for the past 2 days.  And of care discussed at length.  All questions answered.      Discussed with CM regarding patient care.     Imaging that was interpreted personally includes XR foot      Drugs that require monitoring for toxicity include heparin and monitor with CBC, BMP    Discussed management of the case in detail w/ the patient    Comment: Please note this report has been produced using speech recognition software and may contain errors related to that system including errors in grammar, punctuation, and spelling, as well as words and phrases that may be inappropriate. If there are any questions or concerns please feel free to contact the dictating provider for clarification.       Review of Systems:    Review of Systems   Constitutional:  Negative for activity change, appetite change, fatigue and fever.   HENT:  Negative for congestion and sore throat.    Eyes:  Negative for discharge and visual disturbance.   Respiratory:  Negative for cough, shortness of breath and wheezing.    Cardiovascular:  Negative for chest pain, palpitations and leg swelling.   Gastrointestinal:  Positive for constipation. Negative for abdominal distention, diarrhea and nausea.   Genitourinary:  Negative for difficulty urinating and hematuria.   Musculoskeletal:  Negative for arthralgias, back pain and myalgias.   Skin:  Positive for color change and wound.   Neurological:  Negative for dizziness, syncope and headaches.   Hematological:  
with vancomycin 1500mg ivpb x1 dose today after dialysis.  Recheck the vanco level pre-HD on Friday.  Follow blood and wound cultures.  Pharmacy will continue to monitor patient and adjust therapy as indicated    VANCOMYCIN CONCENTRATION SCHEDULED FOR 2/9 @06;00    Thank you for the consult.  Judah Franco RPH  2/7/2024 2:43 PM    
Or  ondansetron, 4 mg, Q6H PRN  HYDROcodone-acetaminophen, 1 tablet, Q6H PRN        Labs      Recent Results (from the past 24 hour(s))   CBC with Diff    Collection Time: 02/06/24  3:30 PM   Result Value Ref Range    WBC 22.6 (H) 4.0 - 10.5 K/CU MM    RBC 3.16 (L) 4.6 - 6.2 M/CU MM    Hemoglobin 9.6 (L) 13.5 - 18.0 GM/DL    Hematocrit 29.4 (L) 42 - 52 %    MCV 93.0 78 - 100 FL    MCH 30.4 27 - 31 PG    MCHC 32.7 32.0 - 36.0 %    RDW 15.7 (H) 11.7 - 14.9 %    Platelets 245 140 - 440 K/CU MM    MPV 9.7 7.5 - 11.1 FL    Differential Type AUTOMATED DIFFERENTIAL     Segs Relative 92.9 (H) 36 - 66 %    Lymphocytes % 1.6 (L) 24 - 44 %    Monocytes % 4.8 (H) 0 - 4 %    Eosinophils % 0.0 0 - 3 %    Basophils % 0.2 0 - 1 %    Segs Absolute 20.9 K/CU MM    Lymphocytes Absolute 0.4 K/CU MM    Monocytes Absolute 1.1 K/CU MM    Eosinophils Absolute 0.0 K/CU MM    Basophils Absolute 0.0 K/CU MM    Nucleated RBC % 0.0 %    Total Nucleated RBC 0.0 K/CU MM    Total Immature Neutrophil 0.12 K/CU MM    Immature Neutrophil % 0.5 (H) 0 - 0.43 %   CMP    Collection Time: 02/06/24  3:30 PM   Result Value Ref Range    Sodium 131 (L) 135 - 145 MMOL/L    Potassium 4.2 3.5 - 5.1 MMOL/L    Chloride 88 (L) 99 - 110 mMol/L    CO2 24 21 - 32 MMOL/L    Anion Gap 19 (H) 7 - 16    Glucose 229 (H) 70 - 99 MG/DL    BUN 62 (H) 6 - 23 MG/DL    Creatinine 8.3 (H) 0.9 - 1.3 MG/DL    Est, Glom Filt Rate 7 (L) >60 mL/min/1.73m2    Calcium 7.0 (L) 8.3 - 10.6 MG/DL    Total Protein 7.1 6.4 - 8.2 GM/DL    Albumin 3.8 3.4 - 5.0 GM/DL    Total Bilirubin 0.5 0.0 - 1.0 MG/DL    Alkaline Phosphatase 89 40 - 128 IU/L    ALT 11 10 - 40 U/L    AST 13 (L) 15 - 37 IU/L   Lipase    Collection Time: 02/06/24  3:30 PM   Result Value Ref Range    Lipase 20 13 - 60 IU/L   Lactic Acid    Collection Time: 02/06/24  3:30 PM   Result Value Ref Range    Lactate 2.4 (HH) 0.5 - 1.9 mMOL/L   C-Reactive Protein    Collection Time: 02/06/24  8:36 PM   Result Value Ref Range    CRP 
distal 5th metatarsal.  2. The proximal phalanx of the 5th digit has reactive edema.  3. Irregularity of the distal 1st metatarsal stump.  However, this is  improved in appearance since the previous study.       Labs:    Recent Results (from the past 24 hour(s))   POCT Glucose    Collection Time: 02/11/24  4:23 PM   Result Value Ref Range    POC Glucose 195 (H) 70 - 99 MG/DL   POCT Glucose    Collection Time: 02/11/24  8:49 PM   Result Value Ref Range    POC Glucose 173 (H) 70 - 99 MG/DL   Basic Metabolic Panel    Collection Time: 02/12/24  3:06 AM   Result Value Ref Range    Sodium 136 135 - 145 MMOL/L    Potassium 5.1 3.5 - 5.1 MMOL/L    Chloride 94 (L) 99 - 110 mMol/L    CO2 22 21 - 32 MMOL/L    Anion Gap 20 (H) 7 - 16    Glucose 169 (H) 70 - 99 MG/DL    BUN 83 (H) 6 - 23 MG/DL    Creatinine 9.3 (H) 0.9 - 1.3 MG/DL    Est, Glom Filt Rate 6 (L) >60 mL/min/1.73m2    Calcium 8.6 8.3 - 10.6 MG/DL   CBC with Auto Differential    Collection Time: 02/12/24  3:06 AM   Result Value Ref Range    WBC 12.0 (H) 4.0 - 10.5 K/CU MM    RBC 2.93 (L) 4.6 - 6.2 M/CU MM    Hemoglobin 8.7 (L) 13.5 - 18.0 GM/DL    Hematocrit 27.4 (L) 42 - 52 %    MCV 93.5 78 - 100 FL    MCH 29.7 27 - 31 PG    MCHC 31.8 (L) 32.0 - 36.0 %    RDW 15.1 (H) 11.7 - 14.9 %    Platelets 304 140 - 440 K/CU MM    MPV 9.4 7.5 - 11.1 FL    Differential Type AUTOMATED DIFFERENTIAL     Segs Relative 85.4 (H) 36 - 66 %    Lymphocytes % 5.8 (L) 24 - 44 %    Monocytes % 6.0 (H) 0 - 4 %    Eosinophils % 1.4 0 - 3 %    Basophils % 0.3 0 - 1 %    Segs Absolute 10.2 K/CU MM    Lymphocytes Absolute 0.7 K/CU MM    Monocytes Absolute 0.7 K/CU MM    Eosinophils Absolute 0.2 K/CU MM    Basophils Absolute 0.0 K/CU MM    Nucleated RBC % 0.0 %    Total Nucleated RBC 0.0 K/CU MM    Total Immature Neutrophil 0.13 K/CU MM    Immature Neutrophil % 1.1 (H) 0 - 0.43 %   Calcium, Ionized    Collection Time: 02/12/24  3:06 AM   Result Value Ref Range    Ionized Ca 1.01 (L) 1.12 - 1.32 
- 1500mg     DOSE TIME DOSE TIME GIVEN - 2/7 @15:00      CULTURE results: Invalid input(s): \"BLOOD CULTURE\", \"URINE CULTURE\", \"SURGICAL CULTURE\"    Diagnosis:  Patient Active Problem List   Diagnosis    Hiatal hernia    Diabetes mellitus type 2, improved controlled    Diabetic neuropathy (HCC)    Panic attack    Anxiety associated with depression    Neck pain    DANIELA (obstructive sleep apnea)    Urinary frequency    Bilateral carpal tunnel syndrome- left worse than right    Hyperlipidemia with target LDL less than 100    Essential hypertension    Bronchitis    Osteomyelitis (HCC)    Abscess    Periumbilical hernia    Sepsis (HCC)    Cellulitis of left foot    Constipation    Intractable nausea and vomiting    Uncontrolled type 2 diabetes mellitus    Nausea and vomiting    Diabetic foot infection (HCC)    Acute renal failure (ARF) (HCC)    Cellulitis    Cellulitis of left arm    Cellulitis, scrotum    Acute epididymitis    Irene gangrene    Recurrent major depressive disorder, in full remission ([facility])    Generalized anxiety disorder    Morbid obesity with body mass index (BMI) of 40.0 to 44.9 in adult ([facility])    Necrotizing fasciitis (HCC)    Syncope    Right groin wound    Ulcer of right groin with fat layer exposed ([facility])    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 type 2 diabetes mellitus, with muscle involvement without evidence of necrosis (HCC)    Nephrotic syndrome    Generalized edema    Stage 3b chronic kidney disease (HCC)    Diabetic nephropathy associated with type 2 diabetes mellitus (HCC)    Hypoalbuminemia    Chronic kidney disease, stage IV (severe) ([facility])    FH: CAD (coronary artery disease)    ASCVD (arteriosclerotic cardiovascular disease)    Other proteinuria    Chest pain    Surgical wound dehiscence    CARMEN (acute kidney injury) (HCC)    Anemia    Chest tightness    NSTEMI (non-ST elevated myocardial infarction) (HCC)    Diabetic foot ulcer with osteomyelitis (HCC)    ESRD (end 
Time: 02/11/24  4:12 AM   Result Value Ref Range    WBC 11.2 (H) 4.0 - 10.5 K/CU MM    RBC 2.62 (L) 4.6 - 6.2 M/CU MM    Hemoglobin 7.8 (L) 13.5 - 18.0 GM/DL    Hematocrit 25.3 (L) 42 - 52 %    MCV 96.6 78 - 100 FL    MCH 29.8 27 - 31 PG    MCHC 30.8 (L) 32.0 - 36.0 %    RDW 15.2 (H) 11.7 - 14.9 %    Platelets 240 140 - 440 K/CU MM    MPV 9.5 7.5 - 11.1 FL    Differential Type AUTOMATED DIFFERENTIAL     Segs Relative 79.8 (H) 36 - 66 %    Lymphocytes % 8.8 (L) 24 - 44 %    Monocytes % 8.2 (H) 0 - 4 %    Eosinophils % 2.1 0 - 3 %    Basophils % 0.4 0 - 1 %    Segs Absolute 8.9 K/CU MM    Lymphocytes Absolute 1.0 K/CU MM    Monocytes Absolute 0.9 K/CU MM    Eosinophils Absolute 0.2 K/CU MM    Basophils Absolute 0.1 K/CU MM    Nucleated RBC % 0.0 %    Total Nucleated RBC 0.0 K/CU MM    Total Immature Neutrophil 0.08 K/CU MM    Immature Neutrophil % 0.7 (H) 0 - 0.43 %   POCT Glucose    Collection Time: 02/11/24  7:37 AM   Result Value Ref Range    POC Glucose 173 (H) 70 - 99 MG/DL        Imaging/Diagnostics Last 24 Hours   XR FOOT LEFT (MIN 3 VIEWS)    Result Date: 2/6/2024  EXAMINATION: THREE XRAY VIEWS OF THE LEFT FOOT 2/6/2024 3:35 pm COMPARISON: Left foot radiographs 10/11/2023.  MRI left foot 10/12/2023. HISTORY: ORDERING SYSTEM PROVIDED HISTORY: left foot infection multiple diabetic ulcer bottom of foot s/p left great toe amputation TECHNOLOGIST PROVIDED HISTORY: Reason for exam:->left foot infection multiple diabetic ulcer bottom of foot s/p left great toe amputation Reason for Exam: left foot infection multiple diabetic uncer botton of foot s/p left great toe amputation FINDINGS: Status post partial amputation of the 1st ray, amputation of the 2nd metatarsal head, and amputation of the 4th toe.  Chronic irregularity about the distal aspect of the 1st metatarsal stump.  No fracture or dislocation identified.  No definite osseous erosion.  Soft tissue ulcerations and gas plantar to the 5th metatarsal head and 5th

## 2024-02-12 NOTE — DISCHARGE SUMMARY
V2.0  Discharge Summary    Name:  Yovanny Levine /Age/Sex: 1975 (48 y.o. male)   Admit Date: 2024  Discharge Date: 24    MRN & CSN:  [id number] & [id number] Encounter Date and Time 24 3:30 PM EST    Attending:  Milan Ramirez MD Discharging Provider: Milan Ramirez MD       Hospital Course:     Brief HPI: Yovanny Levine is a 48 y.o. male who presented with left foot wound.     Brief Problem Based Course:   Sepsis POA secondary to L. Foot cellulitis   Chronic L. Foot diabetic ulcer  XR foot soft tissue ulceration and gas plantar to 5th metatarsal head and base  LA 2.4 on admission, subsequently normalized  Sepsis fluid bolus avoided due to ESRD  .8/Procal 1.43  Based on prior Cx, started on IV Vancomycin and IV Meropenem   blood Cx, Negative  wound Cx growing Proteus species and Enterococcus faecalis, Abx deescalated to meropenem (Vanco d/c'd) on   Local wound care  ID consulted, appreciate recs, left foot MRI confirms OM  General surgery consulted, patient underwent incision and drainage of left foot , necrotic tissue noted.  Repeat debridement done on 2/10.   Abx course as per ID, switched to oral abx.   PT OT consulted as patient has undergone 2 bedside debridements of his left foot, endorses instability while ambulating rec home with [facility]     ESRD on HD (MWF)  LUE AV fistula   Nephrology for inpatient management of HD     T2DM with hyperglycemia  Resume home meds    Watery Diarrhea-resolved  + leukocytosis and risk of C. Difficile   C.difficile ordered, not done     Anemia of chronic disease  Hb 9.6  Stable Hb trend, no evidence of overt bleeding     Hypertension  Continue Amlodipine and Hydralazine     Chronic pain with opioid dependence   Resume home [place] as needed, PDMP reviewed      The patient expressed appropriate understanding of, and agreement with the discharge recommendations, medications, and plan.     Consults this admission:  IP CONSULT TO PHARMACY  IP CONSULT TO

## 2024-02-12 NOTE — CONSULTS
History and Physical    Patient Name: Yovanny Levine                              YOB: 1975  Exam Date: 2/6/2024    PCP:  José Luis Izquierdo MD           Referring Physician:  Hospitalist service    Chief Complaint:  left foot wound    History of Present Illness:  The patient is a 48 y.o. male known to my partners. The patient presented to the hospital with fever/chills at home. He had nausea and diarrhea at home too. Since in the hospital he continues with nausea but has not had any bowel movements. He did not take any imodium or similar med prior to admission. He states he was last on antibiotics at the time of his last foot surgery.     Past Medical History:   Diagnosis Date    Abscess 2010    scrotal    Acute renal failure (ARF) ([facility]) 08/04/2019    Anxiety associated with depression     Anxiety associated with depression     Back pain 07/02/2012    CAD (coronary artery disease) 02/10/2023    Chest pain 05/01/2013    Diabetic nephropathy ([facility])     Diabetic neuropathy ([facility]) 12/22/2011    Diabetic ulcer of left midfoot associated with type 2 diabetes mellitus, with fat layer exposed ([facility]) 07/18/2017    Diverticulosis     C scope + Dr. Medina    DM (diabetes mellitus), type 2 ([facility]) 2002    DR. 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 Medina    Nausea and vomiting 01/11/2019    Necrotizing fasciitis ([facility])     Nose fracture 1988    Panic attacks     Pericarditis 2003    Hospitalized with s/p heart cath normal    Peripheral autonomic neuropathy due to diabetes mellitus (HCC)     Axonal EMG- NCS, March 2011    Sebaceous cyst 09/01/2011    URI (upper respiratory infection) 02/27/2012    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 type 2 diabetes mellitus, with muscle 
Infectious Disease Consult Note  2024   Patient Name: Yovanny Levine : 1975   Impression  Left Foot Wounds, Cellulitis and Possible OM:  Acute Diarrhea: Resolved  Allergy/ Intolerance to doxycycline (N/V):  CrCl 12 with ESRD on HD, Afebrile, leukocytosis of 15.8, Pct 1.43, ,   -BC Pending  -C.dif Pending  Past: 10/12/2023: MRI Foot Left WO contrast: Soft tissue wound/ulceration medially with underlying T2 heterogeneous signal which could reflect phlegmon/necrotic tissue.  No well-defined fluid collection.  Underlying osteomyelitis of the residual 1st metatarsal. 2. Marrow edema along the lateral border of the medial cuneiform and involving the intermediate cuneiform, proximal 2nd metatarsal, and 3rd metatarsal head with decreased T1 signal at the sites.  Osteomyelitis cannot be excluded.  Past: 2023: S/p per Dr. Fuentes: Excisional debridement of left foot.   -XR Left foot: Soft tissue ulcerations and gas plantar to the 5th metatarsal head and base and diffuse soft tissue swelling. No radiographic evidence of osteomyelitis.  -MRI Left foot WO contrast: Pending  General surgery consulted.   ESRD on HD (MWF)/ Hyponatremia:  Dr. James onboard  DMII with Polyneuropathy:   Class II Obesity:  BMI: 37.07 kg/m2  Past Right and Left Toe Amputations:  Multi-morbidity: per PMHx:   Plan:  Continue IV meropenem 500 mg q24h  Continue IV vancomycin per pharmacy dosing  Trend CRP and Pct, ordered  Ordered wound culture left foot  Ordered MRSA screen   Await C.dif and BC  Ordered left foot MRI to evaluate for OM-as the ulcerations have been present a questionable >3-4 weeks, as left foot XR showed no evidence of OM.   Will follow with general surgery    Thank you for allowing me to consult in the care of this patient.  ------------------------  REASON FOR CONSULT: Infective syndrome \"L. Foot cellulitis\"  Requested by: Dr. Milan Ramirez  HPI:Patient is a 48 y.o.  male with a history of ESRD on 
4+/5, minimal impairment in function and endurance.    Neuro:  WFL      Mobility:  Rolling L/R:  NT, pt sitting at beginning and end of session  Supine to sit:  NT, pt sitting at beginning and end of session  Transfers: pt completed STS from EOB and to chair CGA with cues for sequencing  Sitting balance:  good.    Standing balance:  fair+.    Gait: pt ambulated 80' with SPC CGA. Pt had two LOB to right but able to self correct. Pt ambulated with good nic and step length. Cues provided for pathway.    [facility] 6 Clicks Inpatient Mobility:  AM-PAC Inpatient Mobility Raw Score : 20    Safety: patient left in chair (pt signed alarm waiver), call light within reach, RN notified, gait belt used.    Assessment:  Pt is a 48 y.o. male admitted to the hospital for cellulitis of foot. Pt underwent multiple debridements of the L foot during this admission. Pt is typically mod I with SPC for all ambulation and transfers. Pt is currently performing transfers CGA and ambulating 80' with SPC CGA. Pt is functioning close to baseline and does not require any further acute care needs.     Complexity: Low    Prognosis: Good, no significant barriers to participation at this time.     Equipment: none, pt owns SPC    Recommendations for NURSING mobility: amb with gait belt and SPC    Time:   Time in: 1506  Time out: 1522  Timed treatment minutes: 8  Total time: 16    Electronically signed by:    Leanna Kaye, PT  2/12/2024, 3:40 PM   
 Pulse 85   Temp 99.5 °F (37.5 °C) (Oral)   Resp 16   Ht 1.753 m (5' 9\")   Wt 109.2 kg (240 lb 11.2 oz)   SpO2 91%   BMI 35.55 kg/m²   General appearance: in no acute distress, appears stated age  Skin: Skin color, texture, turgor normal. No rashes or lesions  HEENT: normocephalic, atraumatic  Neck: supple, trachea midline  Lungs: clear to auscultation bilaterally, breathing comfortably  Heart:: regular rate and rhythm, S1, S2 normal,  Abdomen: soft, non-tender; bowel sounds normal; no masses,   Extremities:left foot is wrapped, LUE AVF with good thrill  Neurologic: Mental status: alert, oriented, interactive, following commands  Psychiatric: mood and affect appropriate     CBC:   Recent Labs     02/06/24  1530 02/07/24  0512   WBC 22.6* 15.8*   HGB 9.6* 8.7*    214     BMP:    Recent Labs     02/06/24  1530 02/07/24  0512   * 131*   K 4.2 4.4   CL 88* 90*   CO2 24 22   BUN 62* 73*   CREATININE 8.3* 9.2*   GLUCOSE 229* 133*     Hepatic:   Recent Labs     02/06/24  1530 02/07/24  0512   AST 13* 8*   ALT 11 7*   BILITOT 0.5 0.4   ALKPHOS 89 70     Troponin: No results for input(s): \"TROPONINI\" in the last 72 hours.  BNP: No results for input(s): \"BNP\" in the last 72 hours.  No intake/output data recorded.         Electronically signed by Melody James DO on 2/7/2024 at 7:03 AM    [facility]  MD SAMANTHA SMITH DO  [address],  [address]  [place] [address]  PHONE: [phone]  FAX: [phone]       
Tunneling (cm) 0 cm 02/07/24 1345   Tunneling Position ___ O'Clock 0 02/07/24 1345   Undermining Starts ___ O'Clock 0 02/07/24 1345   Undermining Ends___ O'Clock 0 02/07/24 1345   Undermining Maxium Distance (cm) 0 02/07/24 1345   Wound Assessment Purple/maroon 02/07/24 1345   Drainage Amount None (dry) 02/07/24 1345   Margins Attached edges 02/07/24 1345   Number of days: 0       Response to treatment:  Well tolerated by patient.     Pain Assessment:  Severity:  none  Quality of pain:   Wound Pain Timing/Severity:   Premedicated: no    Plan:     Plan of Care: Wound 02/07/24 Foot Left amp site-Dressing/Treatment: ABD, Roll gauze (betadine moist gauze)  Wound 02/07/24 Left;Plantar near base of toe-Dressing/Treatment: ABD, Roll gauze  Wound 02/07/24 Left;Plantar;Distal-Dressing/Treatment: ABD, Roll gauze (betadine moist)  Wound 02/07/24 Left;Plantar;Proximal-Dressing/Treatment: ABD, Roll gauze (betadine moist gauze)  Wound 02/07/24 Right;Plantar cluster-Dressing/Treatment: Open to air    Patient in bed agreeable to wound care eval. Pt has chronic wound to left foot amp site and other areas to plantar with necrotic tissue/odor/discoloration to surrounding tissue. Cleansed with NS measured and pictured. Applied betadine moist gauze. Recommend general surgery consult pt has been seeing Dr Hsieh. Rt plantar has purple areas measured and pictured FAUZIA. Pt is generally not at risk for skin breakdown AEB nicole.     Specialty Bed Required : no  [] Low Air Loss   [] Pressure Redistribution  [] Fluid Immersion  [] Bariatric  [] Total Pressure Relief  [] Other:     Discharge Plan:  Placement for patient upon discharge: tbd  Hospice Care: no  Patient appropriate for Outpatient Wound Care Center: pt follows with Dr Hsieh    Patient/Caregiver Teaching:  Level of patient/caregiver understanding able to:   Pt voiced understanding.        Electronically signed by Rosie Salvador RN,  on 2/7/2024 at 3:13 PM

## 2024-02-12 NOTE — PROCEDURES
3 HOUR HD TREATMENT COMPLETED  BLOOD RETURNED WITHOUT INCIDENT  2.5L NET FLUID REMOVAL  RETACRIT GIVEN BY NURSE AS ORDERED    LEFT AVF WAS USED FOR ACCESS  CANNULATED WITHOUT DIFFICULTIES  HEMOSTASIS ACHIEVED POST TREATMENT  SITES DRESSED WITH NEW GAUZE    VOICED NO NEW NEEDS  REPORT CALLED TO PRIMARY NURSE  RETURNED TO ROOM WITH TRANSPORT    TREATMENT OVERSEEN BY:  URVASHI JIMENEZ RN      Patient Name: Yovanny Levine  Patient : 1975  MRN: [id number]     Acct: [id number]  Date of Admission: 2024  Room/Bed: 1123/1123-A  Code Status:  Full Code  Allergies:   Allergies   Allergen Reactions    Adhesive Tape Rash    Doxycycline Nausea And Vomiting    Reglan [Metoclopramide] Anxiety     Diagnosis:    Patient Active Problem List   Diagnosis    Hiatal hernia    Diabetes mellitus type 2, improved controlled    Diabetic neuropathy (HCC)    Panic attack    Anxiety associated with depression    Neck pain    DANIELA (obstructive sleep apnea)    Urinary frequency    Bilateral carpal tunnel syndrome- left worse than right    Hyperlipidemia with target LDL less than 100    Essential hypertension    Bronchitis    Osteomyelitis (HCC)    Abscess    Periumbilical hernia    Sepsis (HCC)    Cellulitis of left foot    Constipation    Intractable nausea and vomiting    Uncontrolled type 2 diabetes mellitus    Nausea and vomiting    Diabetic foot infection (HCC)    Acute renal failure (ARF) (HCC)    Cellulitis    Cellulitis of left arm    Cellulitis, scrotum    Acute epididymitis    Irene gangrene    Recurrent major depressive disorder, in full remission (HCC)    Generalized anxiety disorder    Morbid obesity with body mass index (BMI) of 40.0 to 44.9 in adult (HCC)    Necrotizing fasciitis (HCC)    Syncope    Right groin wound    Ulcer of right groin with fat layer exposed (HCC)    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 type 2 diabetes mellitus, with muscle involvement without evidence of necrosis (HCC)    
OPERATIVE / PROCEDURE NOTE    Yovanny Levine          Preop Dx:  Diabetic ulcer left foot, osteomyelitis    Postop Dx:  Same    Procedure Performed:  Sharp excision debridement of skin, subcutaneous tissue and muscle, 2 cm sq left foot    Description of Procedure:  The patient was in his hospital bed. His dressing was removed from his left foot. The foot was cleansed. Sharp excision debridement was done using a scalpel to remove non-viable skin, subcutaneous tissue and muscle, 2 sq cm were removed. No abscess was encountered, minimal odor, the skin edged did have bleeding. Betadine soaked cling was placed over the wound defect. Sterile gauze and ABD pads and kerlix were placed. He tolerated the procedure well.     Specimens:  discarded    EBL: 5-10ml    Lalitha Doty MD, M.D., F.A.C.S.   
OPERATIVE / PROCEDURE NOTE    Yovanny Levine          Preop Dx:  left foot diabetic ulcer    Postop Dx:  Same    Procedure Performed:  Sharp excisional debridement of skin, subcutaneous tissue and muscle left foot, 2.5 cm2    Description of Procedure:  The patient was in his hospital bed. His left foot was unwrapped and examined. The plantar wound inferior to the 5th toe was washed with betadine. The thick callous was sharply excised. The underlying tissue was necrotic and malodorous. I sharply excised that tissue and it included skin, subcutaneous tissue and muscle. The tissue was frankly necrotic. There was minimal to no bleeding. The wound was probed and no abscess cavity was entered. I then irrigated the wound with betadine and peroxide using a janet syringe. A 1/2 cling was then soaked in betadine and packed in this wound. I sharply excised callous from the inferior plantar wound as well, the underlying skin was viable. Betadine gauze was placed on the other 2 wounds and the foot was covered with gauze, ABD's and kerlix. The patient tolerated this well.    EBL:  Scant    Specimens:  none      Lalitha Doty MD, M.D., F.A.C.S.   
(ml): 500 ml  Hemodialysis Output (ml): 3000 ml     Tolerated Treatment: Good  Patient Response to Treatment: good          Provider Notification        Handoff complete and report given to Primary RN at 1248 hours.  Primary RN (First Initial, Last Name, Title):  URVASHI RODRIGUEZ RN     Education  Person Educated: Patient   Knowledge Base: Substantial  Barriers to Learning?: None  Preferred method of Learning: Hands-on  Topic(s): Access Care and Procedural   Teaching Tools: Explanation   Response to Education: Verbalized Understanding     Electronically signed by Marina Jenkins RN on 2/7/2024 at 2:49 PM

## 2024-02-20 ENCOUNTER — HOSPITAL ENCOUNTER (OUTPATIENT)
Dept: WOUND CARE | Age: 49
Discharge: HOME | End: 2024-02-20
Payer: MEDICARE

## 2024-02-20 VITALS
HEART RATE: 92 BPM | DIASTOLIC BLOOD PRESSURE: 83 MMHG | SYSTOLIC BLOOD PRESSURE: 182 MMHG | TEMPERATURE: 97.4 F | RESPIRATION RATE: 18 BRPM

## 2024-02-20 DIAGNOSIS — Z89.422 ABSENCE OF TOE OF LEFT FOOT (HCC): Primary | ICD-10-CM

## 2024-02-20 PROCEDURE — 99213 OFFICE O/P EST LOW 20 MIN: CPT

## 2024-02-20 PROCEDURE — 11042 DBRDMT SUBQ TIS 1ST 20SQCM/<: CPT

## 2024-02-20 ASSESSMENT — PAIN - FUNCTIONAL ASSESSMENT: PAIN_FUNCTIONAL_ASSESSMENT: PREVENTS OR INTERFERES WITH ALL ACTIVE AND SOME PASSIVE ACTIVITIES

## 2024-02-20 ASSESSMENT — PAIN DESCRIPTION - ORIENTATION: ORIENTATION: LEFT

## 2024-02-20 ASSESSMENT — PAIN DESCRIPTION - DESCRIPTORS: DESCRIPTORS: SHARP;THROBBING;STABBING

## 2024-02-20 ASSESSMENT — PAIN DESCRIPTION - ONSET: ONSET: ON-GOING

## 2024-02-20 ASSESSMENT — PAIN SCALES - GENERAL: PAINLEVEL_OUTOF10: 9

## 2024-02-20 ASSESSMENT — PAIN DESCRIPTION - LOCATION: LOCATION: FOOT

## 2024-02-20 ASSESSMENT — PAIN SCALES - WONG BAKER: WONGBAKER_NUMERICALRESPONSE: 8;10

## 2024-02-20 ASSESSMENT — PAIN DESCRIPTION - PAIN TYPE: TYPE: CHRONIC PAIN

## 2024-02-20 ASSESSMENT — PAIN DESCRIPTION - FREQUENCY: FREQUENCY: CONTINUOUS

## 2024-02-20 NOTE — PATIENT INSTRUCTIONS
PHYSICIAN ORDERS AND DISCHARGE INSTRUCTIONS     Wound cleansing:              Do not scrub or use excessive force.             Wash hands with soap and water before and after dressing changes.             Prior to applying a clean dressing, cleanse wound with normal saline,               wound cleanser, or mild soap and water.              Ask the physician or nurse before getting the wound(s) wet in a shower                         Grafts:                   Wound Care Notes:  Rx:    Cultures:    Sees Dr Kumar.                                         Orders for this week: 23                 Fax to [facility]      Left plantar wounds : Wash with soap and water, pat dry.   Apply santyl to wound bed.  Cover with ca alginate and ABD  Wrap with kerlix  Secure with Ace   Change: [facility] to Change twice a week              Follow Up Instructions: At the Wound Care Center in 1 weeks  Primary Wound Care Provider: Dr Webber  Call  for any questions or concerns.  Central [phone] for imaging lab work

## 2024-02-20 NOTE — PROGRESS NOTES
not taking: Reported on 2/6/2024) 30 tablet 0    [DISCONTINUED] furosemide (LASIX) 40 MG tablet Take 1 tablet by mouth daily 30 tablet 1    [DISCONTINUED] insulin aspart (NOVOLOG) 100 UNIT/ML injection vial Inject 35 Units into the skin 3 times daily (before meals)      [DISCONTINUED] chlorthalidone (HYGROTON) 25 MG tablet Take 1 tablet by mouth daily 30 tablet 3    [DISCONTINUED] linagliptin (TRADJENTA) 5 MG tablet Take 1 tablet by mouth daily 30 tablet 5    Lancets MISC 1 each by Does not apply route daily 100 each 3    glucose monitoring kit (FREESTYLE) monitoring kit 1 each by Does not apply route once for 1 dose. 1 kit 0     No current facility-administered medications on file prior to encounter.       REVIEW OF SYSTEMS    Pertinent items are noted in HPI.    Constitutional: Negative for systemic symptoms including fever, chills and malaise.      Objective:      BP (!) 182/83   Pulse 92   Temp 97.4 °F (36.3 °C) (Temporal)   Resp 18     PHYSICAL EXAM  General: The patient is in no acute distress.    Mental status:  Patient is appropriate, is  oriented to place and plan of care.  Dermatologic exam: Visual inspection of the periwound reveals the skin to be normal in turgor and texture  Wound exam: see wound description below in procedure note  Musculoskeletal: Bilateral calves are soft and supple upon manual compression.  Negative Castellanos Neto test.    Assessment:   -Type 2 diabetes mellitus with foot ulceration down to fat layer.  Dave grade 1  -Type 2 diabetes mellitus with hyperglycemia and peripheral neuropathy  Problem List Items Addressed This Visit    None    Procedure Note    Indications:  Based on my examination of this patient's wound(s) today, sharp excision into necrotic subcutaneous tissue is required to promote healing and evaluate the extent of previous healing.    Performed by: Andrzej Webber DPM    Consent obtained: Yes    Time out taken:  Yes    Pain Control: lidocaine

## 2024-02-20 NOTE — PLAN OF CARE
Problem: Chronic Conditions and Co-morbidities  Goal: Patient's chronic conditions and co-morbidity symptoms are monitored and maintained or improved  2/20/2024 1427 by Liseth Barnard LPN  Outcome: Progressing  2/20/2024 1427 by Liseth Barnard LPN  Outcome: Progressing

## 2024-02-27 ENCOUNTER — HOSPITAL ENCOUNTER (OUTPATIENT)
Dept: WOUND CARE | Age: 49
Discharge: HOME | End: 2024-02-27
Payer: MEDICARE

## 2024-02-27 VITALS — TEMPERATURE: 97.8 F | HEART RATE: 80 BPM | SYSTOLIC BLOOD PRESSURE: 149 MMHG | DIASTOLIC BLOOD PRESSURE: 66 MMHG

## 2024-02-27 PROCEDURE — 11046 DBRDMT MUSC&/FSCA EA ADDL: CPT

## 2024-02-27 PROCEDURE — 11043 DBRDMT MUSC&/FSCA 1ST 20/<: CPT

## 2024-02-27 NOTE — WOUND CARE
Nonselective enzymatic debridement performed with Santyl per physician order to wound(s) of theLEFT FOOT  Patient tolerated the procedure well.

## 2024-02-27 NOTE — PROGRESS NOTES
performed by Jose Caba DPM at [facility]    TOE AMPUTATION Left 2023    GREAT TOE TRANSMETATARSAL AMPUTATION performed by Sammy Hsieh MD at [facility] OR    TONSILLECTOMY AND ADENOIDECTOMY  1988    UPPER GASTROINTESTINAL ENDOSCOPY  2011    Mild gastritis with moderatley severe antritis, small hiatal hernia, Dr. Medina    UPPER GASTROINTESTINAL ENDOSCOPY N/A 2019    EGD BIOPSY performed by Jose Maria Cornelius MD at [facility]    UPPER GASTROINTESTINAL ENDOSCOPY N/A 2022    EGD DIAGNOSTIC ONLY performed by Jose Maria Cornelius MD at [facility]    UPPER GASTROINTESTINAL ENDOSCOPY N/A 2023    EGD BIOPSY performed by Mia AMBRIZ MD at [facility]       FAMILY HISTORY    Family History   Problem Relation Age of Onset    Cancer Mother         ?site    Stroke Mother     Bleeding Prob Mother     Diabetes Father     Heart Disease Father     High Blood Pressure Father     Obesity Father     Kidney Disease Father     Diabetes Sister     High Blood Pressure Sister     Mental Illness Sister     Obesity Sister     High Blood Pressure Other     Mental Illness Other         bipolar    Other Son         cyst in ear canal    ADHD Daughter        SOCIAL HISTORY    Social History     Tobacco Use    Smoking status: Former     Current packs/day: 0.00     Types: Cigarettes     Start date: 2002     Quit date: 2022     Years since quittin.6    Smokeless tobacco: Never    Tobacco comments:     Smokes when drinking/social   Vaping Use    Vaping Use: Never used   Substance Use Topics    Alcohol use: Not Currently     Comment: occ    Drug use: Yes     Frequency: 7.0 times per week     Types: Marijuana (Weed)     Comment: 21 @        ALLERGIES    Allergies   Allergen Reactions    Adhesive Tape Rash    Doxycycline Nausea And Vomiting    Reglan [Metoclopramide] Anxiety       MEDICATIONS    Current Outpatient Medications on File Prior to Encounter   Medication Sig Dispense Refill    lactobacillus

## 2024-02-27 NOTE — PATIENT INSTRUCTIONS
PHYSICIAN ORDERS AND DISCHARGE INSTRUCTIONS     Wound cleansing:              Do not scrub or use excessive force.             Wash hands with soap and water before and after dressing changes.             Prior to applying a clean dressing, cleanse wound with normal saline,               wound cleanser, or mild soap and water.              Ask the physician or nurse before getting the wound(s) wet in a shower                         Grafts:                   Wound Care Notes:  Rx:    Cultures:    Sees Dr Kumar.                                         Orders for this week: 23                 Fax to [facility]!    Left plantar wounds - Wash with soap and water, pat dry.   Apply santyl to wound bed.  Cover with ca alginate and ABD  Wrap with kerlix  Secure with Ace   HHC to Change twice a week until vac is available       WOUND VAC THERAPY: Left plantar wounds    SKIN PREP OR MASTISOL, THEN DUODERM TO PERIWOUND FOR PROTECTION. APPLY BLACK FOAM TO WOUND. SECURE VAC DRESSING WITH DRAPE.    SET WOUND VAC  CONTINUOUS SUCTION. CANISTER CHANGE WEEKLY OR ACCORDING TO VOLUME OF DRAINAGE.    WOUND VAC DRESSING TO BE CHANGED BY HOME CARE ON  AND SATURDAY (OR ).  WOUND CARE CENTER WILL CHANGE ON  (NEED TO BRING VAC SUPPLIES TO APPOINTMENTS - CANISTER AND BLACK FOAM SUCTION KIT).            Follow Up Instructions: At the Wound Care Center in 1 weeks  Primary Wound Care Provider: Dr Webber  Call  for any questions or concerns.  Central [phone] for imaging lab work

## 2024-03-05 ENCOUNTER — HOSPITAL ENCOUNTER (OUTPATIENT)
Dept: WOUND CARE | Age: 49
Discharge: HOME | End: 2024-03-05
Attending: STUDENT IN AN ORGANIZED HEALTH CARE EDUCATION/TRAINING PROGRAM
Payer: MEDICARE

## 2024-03-05 VITALS
RESPIRATION RATE: 16 BRPM | SYSTOLIC BLOOD PRESSURE: 134 MMHG | HEART RATE: 82 BPM | DIASTOLIC BLOOD PRESSURE: 77 MMHG | TEMPERATURE: 98.2 F

## 2024-03-05 PROCEDURE — 11043 DBRDMT MUSC&/FSCA 1ST 20/<: CPT

## 2024-03-05 PROCEDURE — 11042 DBRDMT SUBQ TIS 1ST 20SQCM/<: CPT

## 2024-03-05 ASSESSMENT — PAIN SCALES - GENERAL: PAINLEVEL_OUTOF10: 7

## 2024-03-05 ASSESSMENT — PAIN DESCRIPTION - PAIN TYPE: TYPE: CHRONIC PAIN

## 2024-03-05 ASSESSMENT — PAIN DESCRIPTION - ONSET: ONSET: ON-GOING

## 2024-03-05 ASSESSMENT — PAIN DESCRIPTION - ORIENTATION: ORIENTATION: LEFT

## 2024-03-05 ASSESSMENT — PAIN DESCRIPTION - FREQUENCY: FREQUENCY: CONTINUOUS

## 2024-03-05 ASSESSMENT — PAIN SCALES - WONG BAKER: WONGBAKER_NUMERICALRESPONSE: 6

## 2024-03-05 ASSESSMENT — PAIN DESCRIPTION - LOCATION: LOCATION: FOOT

## 2024-03-05 ASSESSMENT — PAIN DESCRIPTION - DESCRIPTORS: DESCRIPTORS: SHARP

## 2024-03-05 ASSESSMENT — PAIN - FUNCTIONAL ASSESSMENT: PAIN_FUNCTIONAL_ASSESSMENT: PREVENTS OR INTERFERES WITH MANY ACTIVE NOT PASSIVE ACTIVITIES

## 2024-03-05 NOTE — PATIENT INSTRUCTIONS
PHYSICIAN ORDERS AND DISCHARGE INSTRUCTIONS     Wound cleansing:              Do not scrub or use excessive force.             Wash hands with soap and water before and after dressing changes.             Prior to applying a clean dressing, cleanse wound with normal saline,               wound cleanser, or mild soap and water.              Ask the physician or nurse before getting the wound(s) wet in a shower                         Grafts:                   Wound Care Notes:  Rx:    Cultures:    Sees Dr Kumar.                                         Orders for this week: 3/5/2024    Fax to [facility]!    Left plantar wounds - Wash with soap and water, pat dry.   Apply santyl to wound bed.  Cover with ca alginate and ABD  Wrap with kerlix  Secure with Ace   HHC to Change twice a week until vac is available       WOUND VAC THERAPY: Left plantar wounds    SKIN PREP OR MASTISOL, THEN DUODERM TO PERIWOUND FOR PROTECTION. APPLY BLACK FOAM TO WOUND. SECURE VAC DRESSING WITH DRAPE.    SET WOUND VAC  CONTINUOUS SUCTION. CANISTER CHANGE WEEKLY OR ACCORDING TO VOLUME OF DRAINAGE.    WOUND VAC DRESSING TO BE CHANGED BY HOME CARE ON  AND SATURDAY (OR ).  WOUND CARE CENTER WILL CHANGE ON  (NEED TO BRING VAC SUPPLIES TO APPOINTMENTS - CANISTER AND BLACK FOAM SUCTION KIT).            Follow Up Instructions: At the Wound Care Center in 1 week  Primary Wound Care Provider: Dr Webber  Call  for any questions or concerns.  Central [phone] for imaging lab work

## 2024-03-05 NOTE — PROGRESS NOTES
Wound Care Center Progress Note With Procedure    Yovanny Levine  AGE: 48 y.o.   GENDER: male  : 1975  EPISODE DATE:  3/5/2024     Subjective:     Chief Complaint   Patient presents with    Wound Check     Left foot          HISTORY of PRESENT ILLNESS      Yovanny Levine is a 48 y.o. male who presents today for wound evaluation of chronic wound to the bottom of the left foot. No changes from previous.  Has not heard anything about wound VAC    Denies any calf pain chest pain shortness of breath difficulty breathing.  Denies any constitutional symptoms.    PAST MEDICAL HISTORY        Diagnosis Date    2010    scrotal    Acute renal failure (ARF) ([facility]) 2019    Anxiety associated with depression     Anxiety associated with depression     Back pain 2012    CAD (coronary artery disease) 02/10/2023    Chest pain 2013    Diabetic nephropathy ([facility])     Diabetic neuropathy ([facility]) 2011    Diabetic ulcer of left midfoot associated with type 2 diabetes mellitus, with fat layer exposed ([facility]) 2017    Diverticulosis     C scope + Dr. Medina    DM (diabetes mellitus), type 2 ([facility])     DR. 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 Medina    Nausea and vomiting 2019    Necrotizing fasciitis ([facility])     Nose fracture 1988    Panic attacks     Pericarditis     Hospitalized with s/p heart cath normal    Peripheral autonomic neuropathy due to diabetes mellitus ([facility])     Axonal EMG- NCS, 2011    Sebaceous cyst 2011    URI (upper respiratory infection) 2012    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 type 2 diabetes mellitus, with muscle involvement without evidence of necrosis ([facility]) 2021    WD-Wound, surgical, infected,

## 2024-03-05 NOTE — WOUND CARE
Nonselective enzymatic debridement performed with Santyl per physician order to wound(s) of the LEFT FOOT  Patient tolerated the procedure well.

## 2024-03-12 ENCOUNTER — APPOINTMENT (OUTPATIENT)
Dept: GENERAL RADIOLOGY | Age: 49
DRG: 638 | End: 2024-03-12
Payer: MEDICARE

## 2024-03-12 ENCOUNTER — HOSPITAL ENCOUNTER (OUTPATIENT)
Dept: WOUND CARE | Age: 49
Discharge: HOME | End: 2024-03-12
Attending: STUDENT IN AN ORGANIZED HEALTH CARE EDUCATION/TRAINING PROGRAM
Payer: MEDICARE

## 2024-03-12 ENCOUNTER — HOSPITAL ENCOUNTER (INPATIENT)
Age: 49
LOS: 8 days | Discharge: HOME | DRG: 638 | End: 2024-03-20
Attending: INTERNAL MEDICINE | Admitting: INTERNAL MEDICINE
Payer: MEDICARE

## 2024-03-12 VITALS
TEMPERATURE: 98.7 F | DIASTOLIC BLOOD PRESSURE: 87 MMHG | SYSTOLIC BLOOD PRESSURE: 152 MMHG | HEART RATE: 87 BPM | RESPIRATION RATE: 16 BRPM

## 2024-03-12 DIAGNOSIS — E11.49 OTHER DIABETIC NEUROLOGICAL COMPLICATION ASSOCIATED WITH TYPE 2 DIABETES MELLITUS (HCC): ICD-10-CM

## 2024-03-12 DIAGNOSIS — E11.21 DIABETIC NEPHROPATHY ASSOCIATED WITH TYPE 2 DIABETES MELLITUS (HCC): ICD-10-CM

## 2024-03-12 DIAGNOSIS — M79.672 CHRONIC FOOT PAIN, LEFT: ICD-10-CM

## 2024-03-12 DIAGNOSIS — M86.9 OSTEOMYELITIS OF LEFT FOOT, UNSPECIFIED TYPE (HCC): Primary | ICD-10-CM

## 2024-03-12 DIAGNOSIS — E11.628 DIABETIC FOOT INFECTION (HCC): ICD-10-CM

## 2024-03-12 DIAGNOSIS — Z89.422 ABSENCE OF TOE OF LEFT FOOT (HCC): Primary | ICD-10-CM

## 2024-03-12 DIAGNOSIS — L97.423 DIABETIC ULCER OF LEFT MIDFOOT ASSOCIATED WITH TYPE 2 DIABETES MELLITUS, WITH NECROSIS OF MUSCLE (HCC): ICD-10-CM

## 2024-03-12 DIAGNOSIS — G89.29 CHRONIC FOOT PAIN, LEFT: ICD-10-CM

## 2024-03-12 DIAGNOSIS — E11.621 DIABETIC ULCER OF LEFT MIDFOOT ASSOCIATED WITH TYPE 2 DIABETES MELLITUS, WITH NECROSIS OF MUSCLE (HCC): ICD-10-CM

## 2024-03-12 DIAGNOSIS — L08.9 DIABETIC FOOT INFECTION (HCC): ICD-10-CM

## 2024-03-12 LAB
ALBUMIN SERPL-MCNC: 3.8 GM/DL (ref 3.4–5)
ALP BLD-CCNC: 88 IU/L (ref 40–128)
ALT SERPL-CCNC: 7 U/L (ref 10–40)
ANION GAP SERPL CALCULATED.3IONS-SCNC: 17 MMOL/L (ref 7–16)
AST SERPL-CCNC: 11 IU/L (ref 15–37)
BASOPHILS ABSOLUTE: 0 K/CU MM
BASOPHILS RELATIVE PERCENT: 0.5 % (ref 0–1)
BILIRUB SERPL-MCNC: 0.4 MG/DL (ref 0–1)
BUN SERPL-MCNC: 53 MG/DL (ref 6–23)
CALCIUM SERPL-MCNC: 7.5 MG/DL (ref 8.3–10.6)
CHLORIDE BLD-SCNC: 92 MMOL/L (ref 99–110)
CO2: 27 MMOL/L (ref 21–32)
CREAT SERPL-MCNC: 7.1 MG/DL (ref 0.9–1.3)
DIFFERENTIAL TYPE: ABNORMAL
EOSINOPHILS ABSOLUTE: 0.1 K/CU MM
EOSINOPHILS RELATIVE PERCENT: 1.4 % (ref 0–3)
GFR SERPL CREATININE-BSD FRML MDRD: 9 ML/MIN/1.73M2
GLUCOSE SERPL-MCNC: 214 MG/DL (ref 70–99)
HCT VFR BLD CALC: 22.3 % (ref 42–52)
HEMOGLOBIN: 7 GM/DL (ref 13.5–18)
IMMATURE NEUTROPHIL %: 0.6 % (ref 0–0.43)
LACTATE: 1.9 MMOL/L (ref 0.5–1.9)
LYMPHOCYTES ABSOLUTE: 0.8 K/CU MM
LYMPHOCYTES RELATIVE PERCENT: 8.7 % (ref 24–44)
MCH RBC QN AUTO: 29.7 PG (ref 27–31)
MCHC RBC AUTO-ENTMCNC: 31.4 % (ref 32–36)
MCV RBC AUTO: 94.5 FL (ref 78–100)
MONOCYTES ABSOLUTE: 0.6 K/CU MM
MONOCYTES RELATIVE PERCENT: 6.2 % (ref 0–4)
NUCLEATED RBC %: 0 %
PDW BLD-RTO: 15.5 % (ref 11.7–14.9)
PLATELET # BLD: 196 K/CU MM (ref 140–440)
PMV BLD AUTO: 9.9 FL (ref 7.5–11.1)
POTASSIUM SERPL-SCNC: 4 MMOL/L (ref 3.5–5.1)
RBC # BLD: 2.36 M/CU MM (ref 4.6–6.2)
SEGMENTED NEUTROPHILS ABSOLUTE COUNT: 7.3 K/CU MM
SEGMENTED NEUTROPHILS RELATIVE PERCENT: 82.6 % (ref 36–66)
SODIUM BLD-SCNC: 136 MMOL/L (ref 135–145)
TOTAL IMMATURE NEUTOROPHIL: 0.05 K/CU MM
TOTAL NUCLEATED RBC: 0 K/CU MM
TOTAL PROTEIN: 7.7 GM/DL (ref 6.4–8.2)
WBC # BLD: 8.8 K/CU MM (ref 4–10.5)

## 2024-03-12 PROCEDURE — 2580000003 HC RX 258: Performed by: PHYSICIAN ASSISTANT

## 2024-03-12 PROCEDURE — 0KBW0ZZ EXCISION OF LEFT FOOT MUSCLE, OPEN APPROACH: ICD-10-PCS | Performed by: INTERNAL MEDICINE

## 2024-03-12 PROCEDURE — 11042 DBRDMT SUBQ TIS 1ST 20SQCM/<: CPT

## 2024-03-12 PROCEDURE — 11043 DBRDMT MUSC&/FSCA 1ST 20/<: CPT

## 2024-03-12 PROCEDURE — 99285 EMERGENCY DEPT VISIT HI MDM: CPT

## 2024-03-12 PROCEDURE — 0JBR0ZZ EXCISION OF LEFT FOOT SUBCUTANEOUS TISSUE AND FASCIA, OPEN APPROACH: ICD-10-PCS | Performed by: INTERNAL MEDICINE

## 2024-03-12 PROCEDURE — 11042 DBRDMT SUBQ TIS 1ST 20SQCM/<: CPT | Performed by: NURSE PRACTITIONER

## 2024-03-12 PROCEDURE — 6360000002 HC RX W HCPCS: Performed by: PHYSICIAN ASSISTANT

## 2024-03-12 PROCEDURE — 96375 TX/PRO/DX INJ NEW DRUG ADDON: CPT

## 2024-03-12 PROCEDURE — 83605 ASSAY OF LACTIC ACID: CPT

## 2024-03-12 PROCEDURE — 73630 X-RAY EXAM OF FOOT: CPT

## 2024-03-12 PROCEDURE — 96374 THER/PROPH/DIAG INJ IV PUSH: CPT

## 2024-03-12 PROCEDURE — 85025 COMPLETE CBC W/AUTO DIFF WBC: CPT

## 2024-03-12 PROCEDURE — 11043 DBRDMT MUSC&/FSCA 1ST 20/<: CPT | Performed by: NURSE PRACTITIONER

## 2024-03-12 PROCEDURE — 80053 COMPREHEN METABOLIC PANEL: CPT

## 2024-03-12 PROCEDURE — 87040 BLOOD CULTURE FOR BACTERIA: CPT

## 2024-03-12 PROCEDURE — 1200000000 HC SEMI PRIVATE

## 2024-03-12 RX ORDER — SODIUM CHLORIDE 0.9 % (FLUSH) 0.9 %
5-40 SYRINGE (ML) INJECTION PRN
Status: DISCONTINUED | OUTPATIENT
Start: 2024-03-12 | End: 2024-03-20 | Stop reason: HOSPADM

## 2024-03-12 RX ORDER — INSULIN LISPRO 100 [IU]/ML
0-4 INJECTION, SOLUTION INTRAVENOUS; SUBCUTANEOUS NIGHTLY
Status: DISCONTINUED | OUTPATIENT
Start: 2024-03-13 | End: 2024-03-20 | Stop reason: HOSPADM

## 2024-03-12 RX ORDER — INSULIN LISPRO 100 [IU]/ML
0-8 INJECTION, SOLUTION INTRAVENOUS; SUBCUTANEOUS
Status: DISCONTINUED | OUTPATIENT
Start: 2024-03-13 | End: 2024-03-20 | Stop reason: HOSPADM

## 2024-03-12 RX ORDER — DEXTROSE MONOHYDRATE 100 MG/ML
INJECTION, SOLUTION INTRAVENOUS CONTINUOUS PRN
Status: DISCONTINUED | OUTPATIENT
Start: 2024-03-12 | End: 2024-03-20 | Stop reason: HOSPADM

## 2024-03-12 RX ORDER — POLYETHYLENE GLYCOL 3350 17 G/17G
17 POWDER, FOR SOLUTION ORAL DAILY PRN
Status: DISCONTINUED | OUTPATIENT
Start: 2024-03-12 | End: 2024-03-20 | Stop reason: HOSPADM

## 2024-03-12 RX ORDER — GLUCAGON 1 MG/ML
1 KIT INJECTION PRN
Status: DISCONTINUED | OUTPATIENT
Start: 2024-03-12 | End: 2024-03-20 | Stop reason: HOSPADM

## 2024-03-12 RX ORDER — ATORVASTATIN CALCIUM 40 MG/1
40 TABLET, FILM COATED ORAL NIGHTLY
Status: DISCONTINUED | OUTPATIENT
Start: 2024-03-13 | End: 2024-03-20 | Stop reason: HOSPADM

## 2024-03-12 RX ORDER — ACETAMINOPHEN 650 MG/1
650 SUPPOSITORY RECTAL EVERY 6 HOURS PRN
Status: DISCONTINUED | OUTPATIENT
Start: 2024-03-12 | End: 2024-03-20 | Stop reason: HOSPADM

## 2024-03-12 RX ORDER — HYDRALAZINE HYDROCHLORIDE 25 MG/1
25 TABLET, FILM COATED ORAL EVERY 8 HOURS SCHEDULED
Status: DISCONTINUED | OUTPATIENT
Start: 2024-03-13 | End: 2024-03-20 | Stop reason: HOSPADM

## 2024-03-12 RX ORDER — ONDANSETRON 2 MG/ML
4 INJECTION INTRAMUSCULAR; INTRAVENOUS EVERY 6 HOURS PRN
Status: DISCONTINUED | OUTPATIENT
Start: 2024-03-12 | End: 2024-03-20 | Stop reason: HOSPADM

## 2024-03-12 RX ORDER — SODIUM CHLORIDE 9 MG/ML
INJECTION, SOLUTION INTRAVENOUS PRN
Status: DISCONTINUED | OUTPATIENT
Start: 2024-03-12 | End: 2024-03-20 | Stop reason: HOSPADM

## 2024-03-12 RX ORDER — AMLODIPINE BESYLATE 5 MG/1
5 TABLET ORAL DAILY
Status: DISCONTINUED | OUTPATIENT
Start: 2024-03-13 | End: 2024-03-20 | Stop reason: HOSPADM

## 2024-03-12 RX ORDER — INSULIN GLARGINE 100 [IU]/ML
10 INJECTION, SOLUTION SUBCUTANEOUS NIGHTLY
Status: DISCONTINUED | OUTPATIENT
Start: 2024-03-13 | End: 2024-03-20 | Stop reason: HOSPADM

## 2024-03-12 RX ORDER — MORPHINE SULFATE 4 MG/ML
4 INJECTION, SOLUTION INTRAMUSCULAR; INTRAVENOUS EVERY 30 MIN PRN
Status: DISCONTINUED | OUTPATIENT
Start: 2024-03-12 | End: 2024-03-13

## 2024-03-12 RX ORDER — CALCIUM ACETATE 667 MG/1
1 CAPSULE ORAL
Status: DISCONTINUED | OUTPATIENT
Start: 2024-03-13 | End: 2024-03-20 | Stop reason: HOSPADM

## 2024-03-12 RX ORDER — SODIUM CHLORIDE 0.9 % (FLUSH) 0.9 %
5-40 SYRINGE (ML) INJECTION 2 TIMES DAILY
Status: DISCONTINUED | OUTPATIENT
Start: 2024-03-13 | End: 2024-03-20 | Stop reason: HOSPADM

## 2024-03-12 RX ORDER — HEPARIN SODIUM 5000 [USP'U]/ML
5000 INJECTION, SOLUTION INTRAVENOUS; SUBCUTANEOUS EVERY 8 HOURS SCHEDULED
Status: DISCONTINUED | OUTPATIENT
Start: 2024-03-13 | End: 2024-03-20 | Stop reason: HOSPADM

## 2024-03-12 RX ORDER — ARIPIPRAZOLE 10 MG/1
5 TABLET ORAL NIGHTLY
Status: DISCONTINUED | OUTPATIENT
Start: 2024-03-13 | End: 2024-03-20 | Stop reason: HOSPADM

## 2024-03-12 RX ORDER — ONDANSETRON 4 MG/1
4 TABLET, ORALLY DISINTEGRATING ORAL EVERY 8 HOURS PRN
Status: DISCONTINUED | OUTPATIENT
Start: 2024-03-12 | End: 2024-03-20 | Stop reason: HOSPADM

## 2024-03-12 RX ORDER — ACETAMINOPHEN 325 MG/1
650 TABLET ORAL EVERY 6 HOURS PRN
Status: DISCONTINUED | OUTPATIENT
Start: 2024-03-12 | End: 2024-03-20 | Stop reason: HOSPADM

## 2024-03-12 RX ORDER — OXYCODONE AND ACETAMINOPHEN 7.5; 325 MG/1; MG/1
1 TABLET ORAL EVERY 6 HOURS PRN
Status: DISCONTINUED | OUTPATIENT
Start: 2024-03-12 | End: 2024-03-20 | Stop reason: HOSPADM

## 2024-03-12 RX ORDER — CARVEDILOL 25 MG/1
25 TABLET ORAL 2 TIMES DAILY WITH MEALS
Status: DISCONTINUED | OUTPATIENT
Start: 2024-03-13 | End: 2024-03-20 | Stop reason: HOSPADM

## 2024-03-12 RX ADMIN — MEROPENEM 500 MG: 500 INJECTION, POWDER, FOR SOLUTION INTRAVENOUS at 21:19

## 2024-03-12 RX ADMIN — VANCOMYCIN HYDROCHLORIDE 2000 MG: 5 INJECTION, POWDER, LYOPHILIZED, FOR SOLUTION INTRAVENOUS at 20:39

## 2024-03-12 RX ADMIN — MORPHINE SULFATE 4 MG: 4 INJECTION, SOLUTION INTRAMUSCULAR; INTRAVENOUS at 19:19

## 2024-03-12 ASSESSMENT — PAIN DESCRIPTION - ORIENTATION
ORIENTATION: LEFT

## 2024-03-12 ASSESSMENT — PAIN SCALES - GENERAL
PAINLEVEL_OUTOF10: 7
PAINLEVEL_OUTOF10: 8
PAINLEVEL_OUTOF10: 8

## 2024-03-12 ASSESSMENT — PAIN DESCRIPTION - FREQUENCY: FREQUENCY: CONTINUOUS

## 2024-03-12 ASSESSMENT — PAIN DESCRIPTION - LOCATION
LOCATION: FOOT

## 2024-03-12 ASSESSMENT — PAIN - FUNCTIONAL ASSESSMENT: PAIN_FUNCTIONAL_ASSESSMENT: PREVENTS OR INTERFERES SOME ACTIVE ACTIVITIES AND ADLS

## 2024-03-12 ASSESSMENT — PAIN DESCRIPTION - DESCRIPTORS
DESCRIPTORS: THROBBING
DESCRIPTORS: SHARP

## 2024-03-12 ASSESSMENT — PAIN DESCRIPTION - ONSET: ONSET: ON-GOING

## 2024-03-12 ASSESSMENT — PAIN DESCRIPTION - PAIN TYPE: TYPE: CHRONIC PAIN

## 2024-03-12 NOTE — PROGRESS NOTES
OTHER SURGICAL HISTORY Right 05/22/2015    I & D right great toe with partial amputation    OTHER SURGICAL HISTORY  12/04/2017    inc and drainage of abcess    MN INCISION BONE CORTEX FOOT Left 09/22/2018    LEFT FOOT DEBRIDEMENT INCISION AND DRAINAGE TOP AND BOTTOM performed by Jose Caba DPM at [facility] OR    SCROTAL SURGERY N/A 05/15/2021    SCROTAL AND PERINEAL DEBRIDEMENT performed by Elder Betnacur MD at [facility] OR    SCROTAL SURGERY N/A 05/16/2021    SCROTAL RE-DEBRIDEMENT  INCISION AND DRAINAGE performed by Elder Betancur MD at [facility] OR    SKIN BIOPSY N/A 06/18/2013    sebaceous cyst removal times 4     TOE AMPUTATION      right great toe    TOE AMPUTATION Right 03/23/2019    TOE AMPUTATION RIGHT 5TH TOE performed by Jose Caba DPM at [facility] OR    TOE AMPUTATION Right 08/06/2019    TOE AMPUTATION RIGHT 4TH TOE performed by Jose Caba DPM at [facility] OR    TOE AMPUTATION Left 9/6/2023    GREAT TOE TRANSMETATARSAL AMPUTATION performed by Sammy Hsieh MD at [facility] OR    TONSILLECTOMY AND ADENOIDECTOMY  1988    UPPER GASTROINTESTINAL ENDOSCOPY  06/02/2011    Mild gastritis with moderatley severe antritis, small hiatal hernia, Dr. Medina    UPPER GASTROINTESTINAL ENDOSCOPY N/A 01/12/2019    EGD BIOPSY performed by Jose Maria Cornelius MD at [facility] ENDOSCOPY    UPPER GASTROINTESTINAL ENDOSCOPY N/A 07/26/2022    EGD DIAGNOSTIC ONLY performed by Jose Maria Cornelius MD at [facility] ENDOSCOPY    UPPER GASTROINTESTINAL ENDOSCOPY N/A 9/4/2023    EGD BIOPSY performed by Mia AMBRIZ MD at [facility] ENDOSCOPY       FAMILY HISTORY    Family History   Problem Relation Age of Onset    Cancer Mother         ?site    Stroke Mother     Bleeding Prob Mother     Diabetes Father     Heart Disease Father     High Blood Pressure Father     Obesity Father     Kidney Disease Father     Diabetes Sister     High Blood Pressure Sister     Mental Illness Sister     Obesity Sister     High Blood Pressure Other     Mental Illness Other         bipolar

## 2024-03-12 NOTE — ED PROVIDER NOTES
EMERGENCY DEPARTMENT ENCOUNTER        Pt Name: Yovanny Levine  MRN: [id number]  Birthdate 1975  Date of evaluation: 3/12/2024  Provider: Liseth Tracy PA-C  PCP: José Luis Izquierdo MD    RUSTY. I have evaluated this patient.        Triage CHIEF COMPLAINT       Chief Complaint   Patient presents with    Wound Check     Left foot         HISTORY OF PRESENT ILLNESS      Chief Complaint: Worsening diabetic foot ulcer    Yovanny Levine is a 48 y.o. male who presents for a worsening diabetic foot ulcer.  Patient reports he has been following with the Wound Clinic weekly for this ulcer.  Saw the nurse practitioner today who sent him here for IV ABX and surgical debridement.  He denies any fever.  He has been on Augmentin.  He states he has been waiting for a wound vac but it hasn't come in yet.  Follows with Dr. Webber, podiatry, at the Wound Clinic.  Les Hsieh/Alfredo/Soren surgeon.      Nursing Notes were all reviewed and agreed with or any disagreements were addressed in the HPI.    REVIEW OF SYSTEMS     CONSTITUTIONAL:  Denies fever.  EYES:  Denies visual changes.  HEAD:  Denies headache.  ENT:  Denies earache, nasal congestion, sore throat.  NECK:  Denies neck pain.  RESPIRATORY:  Denies any shortness of breath.  CARDIOVASCULAR:  Denies chest pain.  GI:  Denies nausea or vomiting.    :  Denies urinary symptoms.  MUSCULOSKELETAL:  Denies extremity pain or swelling.  BACK:  Denies back pain.  INTEGUMENT:  + diabetic ulcer.  LYMPHATIC:  Denies lymphadenopathy.  NEUROLOGIC:  Denies any numbness/tingling.  PSYCHIATRIC:  Denies SI/HI.    PAST MEDICAL HISTORY     Past Medical History:   Diagnosis Date    Abscess 2010    scrotal    Acute renal failure (ARF) (HCC) 08/04/2019    Anxiety associated with depression     Anxiety associated with depression     Back pain 07/02/2012    CAD (coronary artery disease) 02/10/2023    Chest pain 05/01/2013    Diabetic nephropathy (HCC)     Diabetic neuropathy (HCC)  (!) 149/90 (!) 142/80   Pulse:    98   Resp:    17   Temp:       TempSrc:       SpO2:  93% 96% 98%   Weight: 115.7 kg (255 lb)      Height: 1.753 m (5' 9\")          Patient was given thefollowing medications:  Medications   vancomycin (VANCOCIN) 2,000 mg in sodium chloride 0.9 % 500 mL IVPB (2,000 mg IntraVENous New Bag 3/12/24 2039)   morphine sulfate (PF) injection 4 mg (4 mg IntraVENous Given 3/12/24 1919)   meropenem (MERREM) 500 mg in sodium chloride 0.9 % 100 mL IVPB (mini-bag) (500 mg IntraVENous New Bag 3/12/24 2119)   oxyCODONE-acetaminophen (PERCOCET) 7.5-325 MG per tablet 1 tablet (has no administration in time range)         Is this patient to be included in the SEP-1 Core Measure due to severe sepsis or septic shock?   No   Exclusion criteria - the patient is NOT to be included for SEP-1 Core Measure due to:  2+ SIRS criteria are not met    MDM:    Chief Complaint/HPI Summary/Differential Diagnosis:  Patient presents to the ED with chief complaint of worsening diabetic ulcer of the left foot.  Patient seen and evaluated.  Triage and nursing notes reviewed and incorporated.  Differential diagnosis includes but is not limited to ulcer, osteomyelitis, cellulitis, others.      History from : Patient    Limitations to history : None    Patient was given the following medications:  Medications   vancomycin (VANCOCIN) 2,000 mg in sodium chloride 0.9 % 500 mL IVPB (2,000 mg IntraVENous New Bag 3/12/24 2039)   morphine sulfate (PF) injection 4 mg (4 mg IntraVENous Given 3/12/24 1919)   meropenem (MERREM) 500 mg in sodium chloride 0.9 % 100 mL IVPB (mini-bag) (500 mg IntraVENous New Bag 3/12/24 2119)   oxyCODONE-acetaminophen (PERCOCET) 7.5-325 MG per tablet 1 tablet (has no administration in time range)       Independent Imaging Interpretation by me:  None.  I did visualize imaging studies but interpretation performed by radiologist.      EKG (if obtained):  Please see supervising physician's note for

## 2024-03-12 NOTE — ED TRIAGE NOTES
Patient presents to the ED with complaint of a wound on his left foot. Patient states he has had the wound for a few months and has been going to the wound clinic. Patient states the wound clinic sent him to the ER to be admitted.

## 2024-03-12 NOTE — PATIENT INSTRUCTIONS
PHYSICIAN ORDERS AND DISCHARGE INSTRUCTIONS     Wound cleansing:              Do not scrub or use excessive force.             Wash hands with soap and water before and after dressing changes.             Prior to applying a clean dressing, cleanse wound with normal saline,               wound cleanser, or mild soap and water.              Ask the physician or nurse before getting the wound(s) wet in a shower                         Grafts:             Wound Care Notes:  Rx:    Cultures:    Sees Dr Kumar.                                         Orders for this week: 3/12/2024    Fax to [facility]!    Left plantar wounds - Wash with soap and water, pat dry.   Paint with betadine  Cover with ABD   Wrap with conform   HHC to Change twice a week until vac is available     WOUND VAC THERAPY: Left plantar wounds: [facility] apply vac at Home   SKIN PREP OR MASTISOL, THEN DUODERM TO PERIWOUND FOR PROTECTION.   APPLY SANTYL and BLACK FOAM TO WOUND. SECURE VAC DRESSING WITH DRAPE.  SET WOUND VAC  CONTINUOUS SUCTION.   CANISTER CHANGE WEEKLY OR ACCORDING TO VOLUME OF DRAINAGE.  WOUND VAC DRESSING TO BE CHANGED BY HOME CARE ON  AND SATURDAY (OR ).  WOUND CARE CENTER WILL CHANGE ON  (NEED TO BRING VAC SUPPLIES TO APPOINTMENTS - CANISTER AND BLACK FOAM SUCTION KIT).     Plan: Report to ER for IV antibiotics and debridement      Follow Up Instructions: At the Wound Care Center in 1 week  Primary Wound Care Provider: Dr Webber  Call  for any questions or concerns.  Central [phone] for imaging lab work

## 2024-03-13 LAB
ANION GAP SERPL CALCULATED.3IONS-SCNC: 17 MMOL/L (ref 7–16)
BASOPHILS ABSOLUTE: 0 K/CU MM
BASOPHILS RELATIVE PERCENT: 0.4 % (ref 0–1)
BUN SERPL-MCNC: 62 MG/DL (ref 6–23)
CALCIUM SERPL-MCNC: 6.9 MG/DL (ref 8.3–10.6)
CHLORIDE BLD-SCNC: 97 MMOL/L (ref 99–110)
CO2: 27 MMOL/L (ref 21–32)
CREAT SERPL-MCNC: 8.1 MG/DL (ref 0.9–1.3)
DIFFERENTIAL TYPE: ABNORMAL
EOSINOPHILS ABSOLUTE: 0.1 K/CU MM
EOSINOPHILS RELATIVE PERCENT: 1.9 % (ref 0–3)
GFR SERPL CREATININE-BSD FRML MDRD: 8 ML/MIN/1.73M2
GLUCOSE BLD-MCNC: 150 MG/DL (ref 70–99)
GLUCOSE BLD-MCNC: 171 MG/DL (ref 70–99)
GLUCOSE BLD-MCNC: 180 MG/DL (ref 70–99)
GLUCOSE BLD-MCNC: 181 MG/DL (ref 70–99)
GLUCOSE BLD-MCNC: 220 MG/DL (ref 70–99)
GLUCOSE SERPL-MCNC: 209 MG/DL (ref 70–99)
HCT VFR BLD CALC: 18.6 % (ref 42–52)
HCT VFR BLD CALC: 21.5 % (ref 42–52)
HEMOGLOBIN: 5.9 GM/DL (ref 13.5–18)
HEMOGLOBIN: 6.9 GM/DL (ref 13.5–18)
IMMATURE NEUTROPHIL %: 0.4 % (ref 0–0.43)
INR BLD: 1.2 INDEX
LYMPHOCYTES ABSOLUTE: 0.9 K/CU MM
LYMPHOCYTES RELATIVE PERCENT: 12.5 % (ref 24–44)
MCH RBC QN AUTO: 30.4 PG (ref 27–31)
MCHC RBC AUTO-ENTMCNC: 31.7 % (ref 32–36)
MCV RBC AUTO: 95.9 FL (ref 78–100)
MONOCYTES ABSOLUTE: 0.7 K/CU MM
MONOCYTES RELATIVE PERCENT: 9 % (ref 0–4)
NUCLEATED RBC %: 0 %
PDW BLD-RTO: 15.6 % (ref 11.7–14.9)
PLATELET # BLD: 154 K/CU MM (ref 140–440)
PMV BLD AUTO: 9.3 FL (ref 7.5–11.1)
POTASSIUM SERPL-SCNC: 3.9 MMOL/L (ref 3.5–5.1)
PROTHROMBIN TIME: 15.5 SECONDS (ref 11.7–14.5)
RBC # BLD: 1.94 M/CU MM (ref 4.6–6.2)
SEGMENTED NEUTROPHILS ABSOLUTE COUNT: 5.5 K/CU MM
SEGMENTED NEUTROPHILS RELATIVE PERCENT: 75.8 % (ref 36–66)
SODIUM BLD-SCNC: 141 MMOL/L (ref 135–145)
TOTAL IMMATURE NEUTOROPHIL: 0.03 K/CU MM
TOTAL NUCLEATED RBC: 0 K/CU MM
WBC # BLD: 7.3 K/CU MM (ref 4–10.5)

## 2024-03-13 PROCEDURE — 86850 RBC ANTIBODY SCREEN: CPT

## 2024-03-13 PROCEDURE — 99223 1ST HOSP IP/OBS HIGH 75: CPT | Performed by: INTERNAL MEDICINE

## 2024-03-13 PROCEDURE — 6370000000 HC RX 637 (ALT 250 FOR IP): Performed by: INTERNAL MEDICINE

## 2024-03-13 PROCEDURE — 30233N1 TRANSFUSION OF NONAUTOLOGOUS RED BLOOD CELLS INTO PERIPHERAL VEIN, PERCUTANEOUS APPROACH: ICD-10-PCS | Performed by: INTERNAL MEDICINE

## 2024-03-13 PROCEDURE — 2500000003 HC RX 250 WO HCPCS: Performed by: INTERNAL MEDICINE

## 2024-03-13 PROCEDURE — 6360000002 HC RX W HCPCS: Performed by: INTERNAL MEDICINE

## 2024-03-13 PROCEDURE — 86900 BLOOD TYPING SEROLOGIC ABO: CPT

## 2024-03-13 PROCEDURE — 85610 PROTHROMBIN TIME: CPT

## 2024-03-13 PROCEDURE — 90935 HEMODIALYSIS ONE EVALUATION: CPT

## 2024-03-13 PROCEDURE — 85018 HEMOGLOBIN: CPT

## 2024-03-13 PROCEDURE — 36430 TRANSFUSION BLD/BLD COMPNT: CPT

## 2024-03-13 PROCEDURE — 2580000003 HC RX 258

## 2024-03-13 PROCEDURE — 82962 GLUCOSE BLOOD TEST: CPT

## 2024-03-13 PROCEDURE — 86901 BLOOD TYPING SEROLOGIC RH(D): CPT

## 2024-03-13 PROCEDURE — 80048 BASIC METABOLIC PNL TOTAL CA: CPT

## 2024-03-13 PROCEDURE — 5A1D70Z PERFORMANCE OF URINARY FILTRATION, INTERMITTENT, LESS THAN 6 HOURS PER DAY: ICD-10-PCS | Performed by: INTERNAL MEDICINE

## 2024-03-13 PROCEDURE — 1200000000 HC SEMI PRIVATE

## 2024-03-13 PROCEDURE — 85025 COMPLETE CBC W/AUTO DIFF WBC: CPT

## 2024-03-13 PROCEDURE — 94761 N-INVAS EAR/PLS OXIMETRY MLT: CPT

## 2024-03-13 PROCEDURE — 2580000003 HC RX 258: Performed by: INTERNAL MEDICINE

## 2024-03-13 PROCEDURE — 36415 COLL VENOUS BLD VENIPUNCTURE: CPT

## 2024-03-13 PROCEDURE — 85014 HEMATOCRIT: CPT

## 2024-03-13 PROCEDURE — 86922 COMPATIBILITY TEST ANTIGLOB: CPT

## 2024-03-13 PROCEDURE — P9016 RBC LEUKOCYTES REDUCED: HCPCS

## 2024-03-13 RX ORDER — CALCITRIOL 0.25 UG/1
0.5 CAPSULE, LIQUID FILLED ORAL 2 TIMES DAILY
Status: DISCONTINUED | OUTPATIENT
Start: 2024-03-13 | End: 2024-03-20 | Stop reason: HOSPADM

## 2024-03-13 RX ORDER — VITAMIN B COMPLEX
5000 TABLET ORAL DAILY
Status: DISCONTINUED | OUTPATIENT
Start: 2024-03-13 | End: 2024-03-20 | Stop reason: HOSPADM

## 2024-03-13 RX ORDER — SODIUM CHLORIDE 9 MG/ML
INJECTION, SOLUTION INTRAVENOUS PRN
Status: DISCONTINUED | OUTPATIENT
Start: 2024-03-13 | End: 2024-03-13

## 2024-03-13 RX ORDER — SODIUM CHLORIDE 9 MG/ML
INJECTION, SOLUTION INTRAVENOUS PRN
Status: COMPLETED | OUTPATIENT
Start: 2024-03-13 | End: 2024-03-14

## 2024-03-13 RX ADMIN — VANCOMYCIN HYDROCHLORIDE 1500 MG: 1.5 INJECTION, POWDER, LYOPHILIZED, FOR SOLUTION INTRAVENOUS at 17:00

## 2024-03-13 RX ADMIN — ATORVASTATIN CALCIUM 40 MG: 40 TABLET, FILM COATED ORAL at 00:49

## 2024-03-13 RX ADMIN — CARVEDILOL 25 MG: 25 TABLET, FILM COATED ORAL at 09:11

## 2024-03-13 RX ADMIN — HYDRALAZINE HYDROCHLORIDE 25 MG: 25 TABLET ORAL at 00:35

## 2024-03-13 RX ADMIN — CALCITRIOL CAPSULES 0.25 MCG 0.5 MCG: 0.25 CAPSULE ORAL at 22:09

## 2024-03-13 RX ADMIN — HYDRALAZINE HYDROCHLORIDE 25 MG: 25 TABLET ORAL at 09:11

## 2024-03-13 RX ADMIN — SODIUM CHLORIDE: 9 INJECTION, SOLUTION INTRAVENOUS at 22:27

## 2024-03-13 RX ADMIN — OXYCODONE AND ACETAMINOPHEN 1 TABLET: 7.5; 325 TABLET ORAL at 00:49

## 2024-03-13 RX ADMIN — ARIPIPRAZOLE 5 MG: 10 TABLET ORAL at 22:09

## 2024-03-13 RX ADMIN — CALCIUM ACETATE 667 MG: 667 CAPSULE ORAL at 09:11

## 2024-03-13 RX ADMIN — INSULIN GLARGINE 10 UNITS: 100 INJECTION, SOLUTION SUBCUTANEOUS at 23:13

## 2024-03-13 RX ADMIN — ARIPIPRAZOLE 5 MG: 10 TABLET ORAL at 00:35

## 2024-03-13 RX ADMIN — MEROPENEM 500 MG: 500 INJECTION, POWDER, FOR SOLUTION INTRAVENOUS at 22:29

## 2024-03-13 RX ADMIN — CALCIUM ACETATE 667 MG: 667 CAPSULE ORAL at 17:04

## 2024-03-13 RX ADMIN — OXYCODONE AND ACETAMINOPHEN 1 TABLET: 7.5; 325 TABLET ORAL at 06:53

## 2024-03-13 RX ADMIN — OXYCODONE AND ACETAMINOPHEN 1 TABLET: 7.5; 325 TABLET ORAL at 17:04

## 2024-03-13 RX ADMIN — INSULIN GLARGINE 10 UNITS: 100 INJECTION, SOLUTION SUBCUTANEOUS at 00:53

## 2024-03-13 RX ADMIN — Medication 5000 UNITS: at 09:11

## 2024-03-13 RX ADMIN — EPOETIN ALFA-EPBX 10000 UNITS: 10000 INJECTION, SOLUTION INTRAVENOUS; SUBCUTANEOUS at 15:16

## 2024-03-13 RX ADMIN — ACETAMINOPHEN 650 MG: 325 TABLET ORAL at 22:16

## 2024-03-13 RX ADMIN — CARVEDILOL 25 MG: 25 TABLET, FILM COATED ORAL at 17:05

## 2024-03-13 RX ADMIN — HYDRALAZINE HYDROCHLORIDE 25 MG: 25 TABLET ORAL at 17:04

## 2024-03-13 RX ADMIN — CALCITRIOL CAPSULES 0.25 MCG 0.5 MCG: 0.25 CAPSULE ORAL at 09:11

## 2024-03-13 RX ADMIN — AMLODIPINE BESYLATE 5 MG: 5 TABLET ORAL at 09:11

## 2024-03-13 RX ADMIN — SODIUM CHLORIDE, PRESERVATIVE FREE 10 ML: 5 INJECTION INTRAVENOUS at 22:29

## 2024-03-13 RX ADMIN — ATORVASTATIN CALCIUM 40 MG: 40 TABLET, FILM COATED ORAL at 22:09

## 2024-03-13 RX ADMIN — SODIUM CHLORIDE, PRESERVATIVE FREE 10 ML: 5 INJECTION INTRAVENOUS at 09:11

## 2024-03-13 RX ADMIN — SODIUM CHLORIDE, PRESERVATIVE FREE 10 ML: 5 INJECTION INTRAVENOUS at 00:53

## 2024-03-13 ASSESSMENT — PAIN SCALES - GENERAL
PAINLEVEL_OUTOF10: 6
PAINLEVEL_OUTOF10: 8
PAINLEVEL_OUTOF10: 4
PAINLEVEL_OUTOF10: 8
PAINLEVEL_OUTOF10: 7
PAINLEVEL_OUTOF10: 4
PAINLEVEL_OUTOF10: 8

## 2024-03-13 ASSESSMENT — PAIN DESCRIPTION - ORIENTATION
ORIENTATION: LEFT
ORIENTATION: RIGHT

## 2024-03-13 ASSESSMENT — PAIN DESCRIPTION - FREQUENCY: FREQUENCY: CONTINUOUS

## 2024-03-13 ASSESSMENT — PAIN DESCRIPTION - LOCATION
LOCATION: FOOT;HEAD
LOCATION: FOOT

## 2024-03-13 ASSESSMENT — PAIN DESCRIPTION - DESCRIPTORS
DESCRIPTORS: THROBBING
DESCRIPTORS: ACHING;THROBBING;SHARP
DESCRIPTORS: THROBBING
DESCRIPTORS: THROBBING

## 2024-03-13 ASSESSMENT — PAIN DESCRIPTION - PAIN TYPE: TYPE: CHRONIC PAIN

## 2024-03-13 ASSESSMENT — PAIN DESCRIPTION - ONSET: ONSET: ON-GOING

## 2024-03-13 NOTE — CONSENT
Informed Consent for Blood Component Transfusion Note    I have discussed with the patient the rationale for blood component transfusion; its benefits in treating or preventing fatigue, organ damage, or death; and its risk which includes mild transfusion reactions, rare risk of blood borne infection, or more serious but rare reactions. I have discussed the alternatives to transfusion, including the risk and consequences of not receiving transfusion. The patient had an opportunity to ask questions and had agreed to proceed with transfusion of blood components.    Electronically signed by Aleksandra Reynoso MD on 3/13/24 at 10:46 AM EDT

## 2024-03-13 NOTE — CARE COORDINATION
Chart reviewed and patient discussed in IDR. Pt seen by CM in ER. Plan is home with roommate and [facility]. CM following for needs.

## 2024-03-13 NOTE — PROGRESS NOTES
Patient admitted last night for osteomyelitis of left foot, he has a pathologic fracture. Patient refuses any amputation. Will continue local wound care and antibiotics.     Full note to follow

## 2024-03-13 NOTE — PROGRESS NOTES
Utilized   Access: AVF  Location: Upper Extremity  Side: Left   Needle gauge:  16  + Bruit/Thrill: Yes    First Use X-ray Verified: Not Applicable  OK to use line order: Not Applicable    Site Assessment:  Signs and Symptoms of Infection/Inflammation: None  If yes: Not Applicable  Dressing: Dry and Intact  Site Prep: Medical Aseptic Technique  Dressing Changed this Treatment: Yes  If yes, by whom: Wendy KELLY  Date of Last Dressing Change: Not Applicable  Antimicrobial Patch in place?:  Not Applicable  Red Alcohol Caps in place?:  Not Applicable  Gauze Dressing?: Yes  Non Dialysis Use?: No  Comment:    Flows: Good, Patent  If access problem, who was notified:     Pre and Post-Assessment  Patient Vitals for the past 8 hrs:   Level of Consciousness Oriented X Heart Rhythm Respiratory Quality/Effort O2 Device Bilateral Breath Sounds Skin Condition/Temp Abdomen Inspection Bowel Sounds (All Quadrants) Edema Comments   03/13/24 0900 0 -- -- Unlabored None (Room air) -- Dry;Warm Obese;Rounded -- None --   03/13/24 0930 -- -- -- -- None (Room air) -- -- -- -- -- --   03/13/24 1030 0 -- -- -- -- -- -- -- -- -- --   03/13/24 1139 0 4 Regular Unlabored None (Room air) Clear Dry;Warm Obese;Rounded Active None Consent verified. Pre tx vs, machine checks completed   03/13/24 1450 0 4 Regular Unlabored None (Room air) Clear Dry;Warm Obese;Rounded Active None Treatment completed     Labs  Recent Labs     03/12/24  1850 03/13/24  0535   WBC 8.8 7.3   HGB 7.0* 5.9*   HCT 22.3* 18.6*    154                                                                  Recent Labs     03/12/24  1850 03/13/24  0535    141   K 4.0 3.9   CL 92* 97*   CO2 27 27   BUN 53* 62*   CREATININE 7.1* 8.1*   GLUCOSE 214* 209*     IV Drips and Rate/Dose   sodium chloride      sodium chloride      dextrose        Safety - Before each treatment:   Dialysis Machine No.: [id number]   Machine Number: [id number]  Dialyzer Lot No.: [id number]  RO Machine Log  Treatment: Good  Patient Response to Treatment: well          Provider Notification        Handoff complete and report given to Primary RN at 1505 hours.  Primary RN (First Initial, Last Name, Title):  SURJIT Ashley RN     Education  Person Educated: Patient   Knowledge Base: Substantial  Barriers to Learning?: None  Preferred method of Learning: Oral  Topic(s): Access Care and Procedural   Teaching Tools: Explanation   Response to Education: Verbalized Understanding     Electronically signed by Shelly Cuellar RN on 3/13/2024 at 3:26 PM

## 2024-03-13 NOTE — CARE COORDINATION
Patient identified as potential readmission. Last admission 2/6-2/12 for cellulitis. Patient here today for left foot wound. CM met with patient to begin discharge planning. CM introduced self and CM role. Patient states he was seen today by Shelly LAUGHLIN at the wound clinic and was instructed to be evaluated in the ER for IV antibiotics and likely admission for surgical debridement. Patient states he is seen at the wound clinic once a week typically by Dr Webber. Patient reports he has been receiving treatment for left foot wound for the past couple months. Patient prescribed Augmentin on 2/12 to be taken at home through 3/20/24. Patient reports he lives with a roommate in a 2 story home in [place]. Patient states he has everything he needs on main level. Patient has PCP and insurance which assists with medication affordability. Patient does not drive, but depends on his family for transportation to and from the wound clinic. Patient receives dialysis Monday, Wednesday and Friday at [facility] on [address] with a chair time of 1145. Patient states he receives transportation to dialysis with [employer] through his insurance. Patient states he is active with [facility] and has visits twice a week for dressing changes. Patient has the following DME: cane. Patient states he received wound vac on Wednesday. Patient found today to have worsening diabetic foot ulcer. Patient is requiring readmission and there were no alternatives to admission to explore at this time. Patient's discharge plan is to return home with roommate and continue [facility] services. When discharged, patient will need updated [facility] orders.

## 2024-03-13 NOTE — CONSULTS
Infectious Disease Consult Note  3/13/2024   Patient Name: Yovanny Levine : 1975     Assessment  Left foot diabetic ulcer and 5th metatarsal osteomyelitis  Long hx of ulcer and x-ray revealed a pathologic fracture of the metatarsal head, neck and fifth digit proximal phalanx compatible with osteomyelitis  Antibiotics alone will not guarantee cure.  Agree with general surgery Dr. Doty evaluation and the patient will need amputation.  However, he declines it at this time.  Wound culture from 2024 was positive for Proteus penneri, Proteus vulgaris, Enterococcus faecalis, Finegoldia magna.  Was treated with a 6-week course of Augmentin.  ESRD on hemodialysis  Type 2 diabetes mellitus with polyneuropathy  Class II obesity  Coronary artery disease  Diverticulosis  Comorbid conditions:     Plan  Therapeutic:  Vancomycin and meropenem  Anticipate a 6-week course of antibiotics  Diagnostic:  CRP, ESR  Wound culture  F/u:  3/12/2024: Blood culture x 2  Other:     Thank you for allowing me to consult in the care of this patient.  ------------------------  REASON FOR CONSULT: Osteomyelitis known patient  Requested by: Aleksandra Reynoso MD  HPI:Patient is a 48 y.o. male living with ESRD on hemodialysis, type 2 diabetes mellitus with polyneuropathy, class II obesity who is known to the infectious disease service.  He has been seen on numerous occasions for bilateral lower extremity diabetic foot ulcers and osteomyelitis.  At his most recent visit in 2024 he had presented with a 3-day history of left foot pain radiating to his left knee and left foot redness.  Wound culture was positive for Proteus penneri, Proteus vulgaris, Enterococcus faecalis, Finegoldia magna.  MRI of the left foot showing osteomyelitis of the distal fifth metatarsal and the proximal phalanx of the fifth digit had reactive edema.  Dr. Doty had performed a sharp excisional debridement of skin, subcutaneous tissue and muscle of the left     UPPER GASTROINTESTINAL ENDOSCOPY N/A 2022    EGD DIAGNOSTIC ONLY performed by Jose Maria Cornelius MD at [facility] ENDOSCOPY    UPPER GASTROINTESTINAL ENDOSCOPY N/A 2023    EGD BIOPSY performed by Mia AMBRIZ MD at [facility] ENDOSCOPY      Family History   Problem Relation Age of Onset    Cancer Mother         ?site    Stroke Mother     Bleeding Prob Mother     Diabetes Father     Heart Disease Father     High Blood Pressure Father     Obesity Father     Kidney Disease Father     Diabetes Sister     High Blood Pressure Sister     Mental Illness Sister     Obesity Sister     High Blood Pressure Other     Mental Illness Other         bipolar    Other Son         cyst in ear canal    ADHD Daughter       Infectious disease related family history - not contibutory.   SOCIAL HISTORY  Social History     Tobacco Use    Smoking status: Former     Current packs/day: 0.00     Types: Cigarettes     Start date: 2002     Quit date: 2022     Years since quittin.6    Smokeless tobacco: Never    Tobacco comments:     Smokes when drinking/social   Substance Use Topics    Alcohol use: Not Currently     Comment: occ      Ancestry: White   No recent travel of significance.  No recent unusual exposures.  NO pets    ?  ALLERGIES  Allergies   Allergen Reactions    Adhesive Tape Rash    Doxycycline Nausea And Vomiting    Reglan [Metoclopramide] Anxiety      MEDICATIONS  Reviewed and are per the chart/EMR.   IMMUNIZATION HISTORY  Immunization History   Administered Date(s) Administered    Influenza 10/15/2013    Influenza, Quadv, 6 mo and older, IM, PF (Flulaval, Fluarix) 2018    Pneumococcal, PPSV23, PNEUMOVAX 23, (age 2y+), SC/IM, 0.5mL 2013    TDaP, ADACEL (age 10y-64y), BOOSTRIX (age 10y+), IM, 0.5mL 2022     ?  Antibiotics:   ?  -------------------------------------------------------------------------------------------------------------------    Vital Signs:  Vitals:    24 1007   BP: 132/73

## 2024-03-13 NOTE — PROGRESS NOTES
V2.0  [facility] Progress Note      Name:  Yovanny Levine /Age/Sex: 1975  (48 y.o. male)   MRN & CSN:  [id number] & [id number] Encounter Date/Time: 3/13/2024 11:59 AM EDT    Location:  [facility] PCP: José Luis Izquierdo MD       Hospital Day: 2    Assessment and Plan:   Yovanny Levine is a 48 y.o. male with pmh of  coronary artery disease, hypertension, diabetes mellitus type 2, on long-term insulin, diabetic foot ulcer, hyperlipidemia, depression, chronic pain   who presents with Diabetic foot infection (HCC)      Plan:  #.  Diabetic foot infection  -Referred from wound clinic  -General surgery consulted from ED  -X-ray of the left foot-progressive extensive osteolysis with pathological fracture and deformity at the fifth metatarsal head and neck and fifth digit proximal phalanx compatible with osteomyelitis.  Adjacent large soft tissue ulceration, soft tissue edema and air.  -Patient refuses any amputation, agreeable to wound VAC plan to continue local wound care and antibiotics  -Continue broad-spectrum antibiotics-vancomycin, meropenem  -Consult infectious disease  -Patient on Augmentin (2 more doses left)       #.  Recent admission 2024 for sepsis secondary to left foot cellulitis  -Left foot MRI confirmed osteomyelitis-patient underwent incision and drainage, debridement of the left foot     #.  ESRD on HD  -Consult nephrology-DR Parmar/Dr James  -Patient is on calcium acetate     #.  PUD  -EGD-2023-1 nonbleeding gastric ulcer noted in the gastric fundus.      #.  Coronary artery disease  -History of PCI and stenting to LAD, left circumflex  -[facility]-2022-patent stents  -Patient not taking aspirin   -On atorvastatin, carvedilol-continue     #.  Chronic normocytic anemia  -Likely secondary to end-stage renal disease, hemoglobin 5.9 this a.m. will transfuse 1 unit PRBC and recheck H&H, continue to monitor continue Epogen during dialysis     #.  Hypertension-amlodipine, carvedilol,

## 2024-03-13 NOTE — PROGRESS NOTES
4 Eyes Skin Assessment     NAME:  Yovanny Levine  YOB: 1975  MEDICAL RECORD NUMBER:  [id number]    The patient is being assessed for  Admission    I agree that at least one RN has performed a thorough Head to Toe Skin Assessment on the patient. ALL assessment sites listed below have been assessed.      Areas assessed by both nurses:    Head, Face, Ears, Shoulders, Back, Chest, Arms, Elbows, Hands, Sacrum. Buttock, Coccyx, Ischium, Legs. Feet and Heels, and Under Medical Devices   Open diabetic  foot ulcer to lateral left foot with serosanguineous drainage. Callous areas to flori feet. Abrasion to RLE. Scattered self inflicted scratches to BUE and abdomen. Buttocks pink. Fistula LUE.       Does the Patient have a Wound? Yes wound(s) were present on assessment. LDA wound assessment was Initiated and completed by ROBIN Fernández Prevention initiated by RN: No  Wound Care Orders initiated by RN: Yes    Pressure Injury (Stage 3,4, Unstageable, DTI, NWPT, and Complex wounds) if present, place Wound referral order by RN under : No    New Ostomies, if present place, Ostomy referral order under : No     Nurse 1 eSignature: Electronically signed by Nallely Dickson LPN on 3/13/24 at 4:31 AM EDT    **SHARE this note so that the co-signing nurse can place an eSignature**    Nurse 2 eSignature: Electronically signed by Yoli Swenson RN on 3/13/24 at 4:37 AM EDT

## 2024-03-13 NOTE — CONSULTS
Nephrology Service Consultation      [address], [address]  [place] [address]  Phone: [phone]  Office Hours: 8:30AM - 4:30PM  Monday - Friday        MEDICAL DECISION MAKING and Recommendations     -Left foot infection: worsening  -Acute anemia  -Hypocalcemia   -Vitamin D deficiency  -ESRD on HD MWF  -HTN  -CAD/CHF HX    Suggest:  -Transfuse as needed  -HD planned today  -Surgical eval//pt seems to not want any amputations  -Resume calcitriol, resume vit D supplementation    Thank you      Patient Active Problem List    Diagnosis Date Noted    Volume overload 02/22/2023    Acute on chronic respiratory failure with hypoxia ([facility]) 02/10/2023    CAD (coronary artery disease) 02/10/2023    Fluid overload 02/07/2023    Problem with vascular access 11/26/2022    ESRD (end stage renal disease) ([facility]) 09/12/2022    Diabetic foot ulcer with osteomyelitis ([facility]) 09/01/2022    NSTEMI (non-ST elevated myocardial infarction) ([facility]) 08/24/2022    Chest tightness 08/22/2022    CARMEN (acute kidney injury) ([facility]) 07/25/2022    Anemia 07/25/2022    Recurrent major depressive disorder, in full remission ([facility]) 05/18/2021    Generalized anxiety disorder 05/18/2021    Absence of toe of left foot ([facility]) 02/20/2024    Diabetic ulcer of left midfoot associated with diabetes mellitus due to underlying condition, with fat layer exposed ([facility]) 01/12/2024    Open wound of foot, left, sequela 01/12/2024    Chronic foot pain, left 01/12/2024    Open wound of plantar aspect of left foot 10/17/2023    Acute osteomyelitis of metatarsal bone of left foot ([facility]) 09/05/2023    Gangrene ([facility]) 08/01/2023    Surgical wound dehiscence 02/08/2022    Chest pain 12/21/2021    Other proteinuria     FH: CAD (coronary artery disease) 09/29/2021    ASCVD (arteriosclerotic cardiovascular disease) 09/29/2021    Chronic kidney disease, stage IV (severe) ([facility]) 09/28/2021    Nephrotic syndrome 09/22/2021    Generalized edema 09/22/2021    Stage 3b

## 2024-03-13 NOTE — PROGRESS NOTES
PHARMACY VANCOMYCIN MONITORING SERVICE  Pharmacy consulted by Dr. Stuart for monitoring and adjustment.    Indication for treatment: Vancomycin indication: Bone/Joint infection   Goal trough: Trough Goal: 15-20 mcg/mL  AUC/PAMELA: 400-600    Risk Factors for MRSA Identified:   Hospitalization within the past 90 days, Received IV antibiotics within the past 90 days, Patient on hemodialysis, Purulent and/or complicated SSTI    Pertinent Laboratory Values:   Temp Readings from Last 3 Encounters:   03/13/24 97.9 °F (36.6 °C) (Oral)   03/12/24 98.7 °F (37.1 °C) (Temporal)   03/05/24 98.2 °F (36.8 °C) (Temporal)     Recent Labs     03/12/24  1850   WBC 8.8   LACTATE 1.9     Recent Labs     03/12/24  1850   BUN 53*   CREATININE 7.1*     Estimated Creatinine Clearance: 16 mL/min (A) (based on SCr of 7.1 mg/dL (H)).  No intake or output data in the 24 hours ending 03/13/24 0609  Last Encounter Weight:  Wt Readings from Last 3 Encounters:   03/12/24 115.7 kg (255 lb)   02/12/24 114.4 kg (252 lb 3.2 oz)   01/10/24 114.3 kg (252 lb)     Pertinent Cultures:   Date    Source    Results  03/12   Blood    In process    Assessment:  HPI: 48 y.o. male with history of CAD, HTN, DM type II, HLD, and diabetic foot ulcer.  Patient presented to ED with an open wound on his left foot.  X-ray of left foot shows progressive extensive osteomyelitis.  SCr = 7.1, BUN = 53, and no I/O data  ESRD on HD (Nephrology consulted)  Day(s) of therapy: 1  Vancomycin concentration:   ESRD on HD: Will need levels ordered    Plan:  Vancomycin 2,000 mg IV given in ED; Plan for intermittent dosing based on pre-dialysis levels  Pharmacy will continue to monitor patient and adjust therapy as indicated    Thank you for the consult.  Francisco Real RPH  3/13/2024 6:09 AM

## 2024-03-13 NOTE — PROGRESS NOTES
PHARMACY VANCOMYCIN MONITORING SERVICE  Pharmacy consulted by Dr. Stuart for monitoring and adjustment.    Indication for treatment: Vancomycin indication: Bone/Joint infection   Goal trough: Trough Goal: 15-20 mcg/mL  AUC/PAMELA: 400-600    Risk Factors for MRSA Identified:   Hospitalization within the past 90 days, Received IV antibiotics within the past 90 days, Patient on hemodialysis, Purulent and/or complicated SSTI    Pertinent Laboratory Values:   Temp Readings from Last 3 Encounters:   03/13/24 98 °F (36.7 °C)   03/12/24 98.7 °F (37.1 °C) (Temporal)   03/05/24 98.2 °F (36.8 °C) (Temporal)     Recent Labs     03/12/24  1850 03/13/24  0535   WBC 8.8 7.3   LACTATE 1.9  --        Recent Labs     03/12/24  1850 03/13/24  0535   BUN 53* 62*   CREATININE 7.1* 8.1*       Estimated Creatinine Clearance: 14 mL/min (A) (based on SCr of 8.1 mg/dL (H)).  No intake or output data in the 24 hours ending 03/13/24 1317  Last Encounter Weight:  Wt Readings from Last 3 Encounters:   03/12/24 115.7 kg (255 lb)   02/12/24 114.4 kg (252 lb 3.2 oz)   01/10/24 114.3 kg (252 lb)     Pertinent Cultures:   Date    Source    Results  03/12   Blood    In process    Assessment:  HPI: 48 y.o. male with history of CAD, HTN, DM type II, HLD, and diabetic foot ulcer.  Patient presented to ED with an open wound on his left foot.  X-ray of left foot shows progressive extensive osteomyelitis.  3/13 - Patient refuses any amputation.  Local wound and antibiotics to continue.  ID consulted.  ESRD on HD on M/W/F, Pt receiving dialysis today  Day(s) of therapy: 2   Vancomycin concentration:   3/15 - random level at 06:00     Plan:  Vancomycin 2000mg ivpb initial dose given last night.  Continue intermittent vanco dosing based on pre-dialysis levels  No vancomycin level ordered pre-HD today.  Due to worsening infection, will give vancomycin 1500mg ivpb x1 dose today after dialysis (2000mg + 1500mg ivpb doses given last admission around dialysis

## 2024-03-13 NOTE — H&P
2002 03/12/24 2031   BP:   (!) 160/103 (!) 149/90   Pulse:       Resp: 16      Temp:       TempSrc:       SpO2:   93% 96%   Weight:  115.7 kg (255 lb)     Height:  1.753 m (5' 9\")         Medications Prior to Admission   Reviewed medications with patient    Prior to Admission medications    Medication Sig Start Date End Date Taking? Authorizing Provider   Ergocalciferol (VITAMIN D) 73056 units CAPS 50,000 Units, Oral, USER SPECIFIED (Once per day on Wed Sat), 2/14/24   Milan Ramirez MD   amoxicillin-clavulanate (AUGMENTIN) 500-125 MG per tablet Take 1 tablet by mouth every 24 hours for 37 doses 2/12/24 3/20/24  Milan Ramirez MD   aspirin 81 MG chewable tablet Take 1 tablet by mouth daily  Patient not taking: Reported on 3/12/2024 10/14/23   Mary Long MD   epoetin paola-epbx (RETACRIT) 51955 UNIT/ML SOLN injection Inject 1 mL into the skin Every Monday, Wednesday, and Friday  Patient not taking: Reported on 2/6/2024 10/16/23   Mary Long MD   insulin glargine (LANTUS SOLOSTAR) 100 UNIT/ML injection pen Inject 10 Units into the skin nightly 9/5/23   Ashlie Brown MD   glucose 4 g chewable tablet Take 4 tablets by mouth as needed for Low blood sugar 8/6/23   Broderick Schafer MD   hydrALAZINE (APRESOLINE) 25 MG tablet Take 1 tablet by mouth every 8 hours 8/6/23   Broderick Schafer MD   amLODIPine (NORVASC) 5 MG tablet Take 1 tablet by mouth daily 8/6/23   Broderick Schafer MD   calcium acetate (PHOSLO) 667 MG CAPS capsule Take 1 capsule by mouth 3 times daily (with meals) 8/6/23   Broderick Schafer MD   atorvastatin (LIPITOR) 40 MG tablet Take 1 tablet by mouth nightly 2/24/23   Emmanuel Mcginnis MD   calcitRIOL (ROCALTROL) 0.5 MCG capsule Take 1 capsule by mouth daily  Patient not taking: Reported on 2/6/2024 2/24/23   Emmanuel Mcginnis MD   carvedilol (COREG) 25 MG tablet Take 1 tablet by mouth 2 times daily (with meals)    Provider, MD Deejay   calcium carbonate (TUMS) 500 MG chewable tablet Take 2 tablets by mouth 3 times  07/24/2022 11:40 PM    TRICHOMONAS NONE SEEN 08/30/2023 02:00 AM    BACTERIA NEGATIVE 08/30/2023 02:00 AM    CLARITYU CLEAR 08/30/2023 02:00 AM    SPECGRAV 1.010 08/30/2023 02:00 AM    LEUKOCYTESUR NEGATIVE 08/30/2023 02:00 AM    UROBILINOGEN 1.0 08/30/2023 02:00 AM    BILIRUBINUR NEGATIVE 08/30/2023 02:00 AM    BILIRUBINUR neg 06/20/2013 09:51 AM    BLOODU TRACE 08/30/2023 02:00 AM    GLUCOSEU 1,000 06/20/2013 09:51 AM    KETUA NEGATIVE 08/30/2023 02:00 AM    AMORPHOUS RARE 07/24/2022 11:40 PM     Urine Cultures: No results found for: \"LABURIN\"  Blood Cultures: No results found for: \"BC\"  No results found for: \"BLOODCULT2\"  Organism:   Lab Results   Component Value Date/Time    ORG SAUR 01/07/2019 12:08 AM       Imaging/Diagnostics Last 24 Hours   XR FOOT LEFT (MIN 3 VIEWS)    Result Date: 3/12/2024  EXAM: XR FOOT LEFT (MIN 3 VIEWS) INDICATION: Non healing wound TECHNIQUE: radiographic views: XR FOOT LEFT (MIN 3 VIEWS) COMPARISON: Left foot radiographs 6/20/2024 FINDINGS: BONES: No change. Osteotomy at the first metatarsal mid aspect. Punctate residual osseous fragments at the osteotomy site. Chronic osteolysis versus osteotomy at the second metatarsal head and neck. Mild widening across the residual pseudoarticulation with the second digit phalanges. No change. Amputation of the fourth digit phalanges. Progressive extensive osteolysis with pathologic fracture and deformity at the fifth metatarsal head and neck and fifth digit proximal phalanx compatible with osteomyelitis. Adjacent large soft tissue ulceration, soft tissue edema and air. JOINT SPACES: Negative foot joint space loss and osteophytes. Small posterior and plantar calcaneal enthesophytes SOFT TISSUES: Atherosclerosis. Extensive forefoot edema OTHER FINDINGS: None.     Progressive extensive osteolysis with pathologic fracture and deformity at the fifth metatarsal head and neck and fifth digit proximal phalanx compatible with osteomyelitis. Adjacent

## 2024-03-13 NOTE — ED NOTES
ED TO INPATIENT SBAR HANDOFF    Patient Name: Yovanny Levine   :  1975  48 y.o.   Preferred Name  Yovanny  Family/Caregiver Present no   Restraints no   C-SSRS: Risk of Suicide: No Risk  Sitter no   Sepsis Risk Score Sepsis Risk Score: 1.2      Situation  Chief Complaint   Patient presents with    Wound Check     Left foot     Brief Description of Patient's Condition: Patient presents to the ED with complaint of a wound on his left foot. Patient states he has had the wound for a few months and has been going to the wound clinic. Patient states the wound clinic sent him to the ER to be admitted.   Mental Status: oriented and alert  Arrived from: home    Imaging:   XR FOOT LEFT (MIN 3 VIEWS)   Final Result      Progressive extensive osteolysis with pathologic fracture and deformity at the fifth metatarsal head and neck and fifth digit proximal phalanx compatible with osteomyelitis. Adjacent large soft tissue ulceration, soft tissue edema and air.      Electronically signed by Ericka Reynoso DO        Abnormal labs:   Abnormal Labs Reviewed   CBC WITH AUTO DIFFERENTIAL - Abnormal; Notable for the following components:       Result Value    RBC 2.36 (*)     Hemoglobin 7.0 (*)     Hematocrit 22.3 (*)     MCHC 31.4 (*)     RDW 15.5 (*)     Segs Relative 82.6 (*)     Lymphocytes % 8.7 (*)     Monocytes % 6.2 (*)     Immature Neutrophil % 0.6 (*)     All other components within normal limits   COMPREHENSIVE METABOLIC PANEL - Abnormal; Notable for the following components:    Chloride 92 (*)     Anion Gap 17 (*)     Glucose 214 (*)     BUN 53 (*)     Creatinine 7.1 (*)     Est, Glom Filt Rate 9 (*)     Calcium 7.5 (*)     ALT 7 (*)     AST 11 (*)     All other components within normal limits       Background  History:   Past Medical History:   Diagnosis Date    Abscess     scrotal    Acute renal failure (ARF) ([facility]) 2019    Anxiety associated with depression     Anxiety associated with depression     Back pain  07/02/2012    CAD (coronary artery disease) 02/10/2023    Chest pain 05/01/2013    Diabetic nephropathy (HCC)     Diabetic neuropathy (HCC) 12/22/2011    Diabetic ulcer of left midfoot associated with type 2 diabetes mellitus, with fat layer exposed (HCC) 07/18/2017    Diverticulosis     C scope + Dr. Medina    DM (diabetes mellitus), type 2 (HCC) 2002    DR. 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 Medina    Nausea and vomiting 01/11/2019    Necrotizing fasciitis (HCC)     Nose fracture 1988    Panic attacks     Pericarditis 2003    Hospitalized with s/p heart cath normal    Peripheral autonomic neuropathy due to diabetes mellitus (HCC)     Axonal EMG- NCS, March 2011    Sebaceous cyst 09/01/2011    URI (upper respiratory infection) 02/27/2012    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 type 2 diabetes mellitus, with muscle involvement without evidence of necrosis (HCC) 09/21/2021    WD-Wound, surgical, infected, initial encounter 12/08/2017    Wrist fracture 1986       Assessment    Vitals:        Vitals:    03/12/24 1949 03/12/24 2002 03/12/24 2031 03/12/24 2231   BP:  (!) 160/103 (!) 149/90 (!) 142/80   Pulse:    98   Resp:    17   Temp:       TempSrc:       SpO2:  93% 96% 98%   Weight: 115.7 kg (255 lb)      Height: 1.753 m (5' 9\")        PO Status: NPO after midnight  O2 Flow Rate: O2 Device: None (Room air)    Cardiac Rhythm: NS  Last documented pain medication administered: 1919  NIH Score: NIH     Active LDA's:   Peripheral IV 03/12/24 Right Antecubital (Active)       Pertinent or High Risk Medications/Drips: no   If Yes, please provide details: na    Blood Product Administration: no  If Yes, please provide details: na    Recommendation    Incomplete orders na  Additional

## 2024-03-14 LAB
ANION GAP SERPL CALCULATED.3IONS-SCNC: 16 MMOL/L (ref 7–16)
BASOPHILS ABSOLUTE: 0 K/CU MM
BASOPHILS RELATIVE PERCENT: 0.7 % (ref 0–1)
BUN SERPL-MCNC: 43 MG/DL (ref 6–23)
CALCIUM SERPL-MCNC: 7.5 MG/DL (ref 8.3–10.6)
CHLORIDE BLD-SCNC: 96 MMOL/L (ref 99–110)
CO2: 26 MMOL/L (ref 21–32)
CREAT SERPL-MCNC: 6.3 MG/DL (ref 0.9–1.3)
DIFFERENTIAL TYPE: ABNORMAL
EOSINOPHILS ABSOLUTE: 0.2 K/CU MM
EOSINOPHILS RELATIVE PERCENT: 2.7 % (ref 0–3)
GFR SERPL CREATININE-BSD FRML MDRD: 10 ML/MIN/1.73M2
GLUCOSE BLD-MCNC: 125 MG/DL (ref 70–99)
GLUCOSE BLD-MCNC: 167 MG/DL (ref 70–99)
GLUCOSE BLD-MCNC: 171 MG/DL (ref 70–99)
GLUCOSE BLD-MCNC: 190 MG/DL (ref 70–99)
GLUCOSE BLD-MCNC: 298 MG/DL (ref 70–99)
GLUCOSE SERPL-MCNC: 119 MG/DL (ref 70–99)
HCT VFR BLD CALC: 22.2 % (ref 42–52)
HEMOGLOBIN: 7 GM/DL (ref 13.5–18)
IMMATURE NEUTROPHIL %: 0.7 % (ref 0–0.43)
LYMPHOCYTES ABSOLUTE: 0.8 K/CU MM
LYMPHOCYTES RELATIVE PERCENT: 13.1 % (ref 24–44)
MCH RBC QN AUTO: 30.3 PG (ref 27–31)
MCHC RBC AUTO-ENTMCNC: 31.5 % (ref 32–36)
MCV RBC AUTO: 96.1 FL (ref 78–100)
MONOCYTES ABSOLUTE: 0.6 K/CU MM
MONOCYTES RELATIVE PERCENT: 9.4 % (ref 0–4)
NUCLEATED RBC %: 0 %
PDW BLD-RTO: 16.3 % (ref 11.7–14.9)
PLATELET # BLD: 145 K/CU MM (ref 140–440)
PMV BLD AUTO: 10.1 FL (ref 7.5–11.1)
POTASSIUM SERPL-SCNC: 3.9 MMOL/L (ref 3.5–5.1)
RBC # BLD: 2.31 M/CU MM (ref 4.6–6.2)
SEGMENTED NEUTROPHILS ABSOLUTE COUNT: 4.3 K/CU MM
SEGMENTED NEUTROPHILS RELATIVE PERCENT: 73.4 % (ref 36–66)
SODIUM BLD-SCNC: 138 MMOL/L (ref 135–145)
TOTAL IMMATURE NEUTOROPHIL: 0.04 K/CU MM
TOTAL NUCLEATED RBC: 0 K/CU MM
WBC # BLD: 5.9 K/CU MM (ref 4–10.5)

## 2024-03-14 PROCEDURE — 6370000000 HC RX 637 (ALT 250 FOR IP): Performed by: INTERNAL MEDICINE

## 2024-03-14 PROCEDURE — 94761 N-INVAS EAR/PLS OXIMETRY MLT: CPT

## 2024-03-14 PROCEDURE — 2500000003 HC RX 250 WO HCPCS: Performed by: NURSE PRACTITIONER

## 2024-03-14 PROCEDURE — 36415 COLL VENOUS BLD VENIPUNCTURE: CPT

## 2024-03-14 PROCEDURE — 2580000003 HC RX 258: Performed by: INTERNAL MEDICINE

## 2024-03-14 PROCEDURE — 82962 GLUCOSE BLOOD TEST: CPT

## 2024-03-14 PROCEDURE — 6360000002 HC RX W HCPCS: Performed by: INTERNAL MEDICINE

## 2024-03-14 PROCEDURE — 1200000000 HC SEMI PRIVATE

## 2024-03-14 PROCEDURE — 2500000003 HC RX 250 WO HCPCS: Performed by: INTERNAL MEDICINE

## 2024-03-14 PROCEDURE — 85025 COMPLETE CBC W/AUTO DIFF WBC: CPT

## 2024-03-14 PROCEDURE — 80048 BASIC METABOLIC PNL TOTAL CA: CPT

## 2024-03-14 PROCEDURE — 0202U NFCT DS 22 TRGT SARS-COV-2: CPT

## 2024-03-14 RX ORDER — GUAIFENESIN 200 MG/10ML
200 LIQUID ORAL EVERY 4 HOURS PRN
Status: DISCONTINUED | OUTPATIENT
Start: 2024-03-14 | End: 2024-03-20 | Stop reason: HOSPADM

## 2024-03-14 RX ORDER — ALBUTEROL SULFATE 2.5 MG/3ML
2.5 SOLUTION RESPIRATORY (INHALATION) EVERY 6 HOURS PRN
Status: DISCONTINUED | OUTPATIENT
Start: 2024-03-14 | End: 2024-03-20 | Stop reason: HOSPADM

## 2024-03-14 RX ADMIN — ACETAMINOPHEN 650 MG: 325 TABLET ORAL at 21:50

## 2024-03-14 RX ADMIN — HYDRALAZINE HYDROCHLORIDE 25 MG: 25 TABLET ORAL at 17:55

## 2024-03-14 RX ADMIN — CALCIUM ACETATE 667 MG: 667 CAPSULE ORAL at 17:55

## 2024-03-14 RX ADMIN — ATORVASTATIN CALCIUM 40 MG: 40 TABLET, FILM COATED ORAL at 21:23

## 2024-03-14 RX ADMIN — GUAIFENESIN 200 MG: 100 SOLUTION ORAL at 23:30

## 2024-03-14 RX ADMIN — CALCIUM ACETATE 667 MG: 667 CAPSULE ORAL at 12:32

## 2024-03-14 RX ADMIN — CALCITRIOL CAPSULES 0.25 MCG 0.5 MCG: 0.25 CAPSULE ORAL at 09:45

## 2024-03-14 RX ADMIN — Medication 5000 UNITS: at 09:44

## 2024-03-14 RX ADMIN — ARIPIPRAZOLE 5 MG: 10 TABLET ORAL at 21:23

## 2024-03-14 RX ADMIN — OXYCODONE AND ACETAMINOPHEN 1 TABLET: 7.5; 325 TABLET ORAL at 03:24

## 2024-03-14 RX ADMIN — CARVEDILOL 25 MG: 25 TABLET, FILM COATED ORAL at 17:55

## 2024-03-14 RX ADMIN — OXYCODONE AND ACETAMINOPHEN 1 TABLET: 7.5; 325 TABLET ORAL at 09:45

## 2024-03-14 RX ADMIN — INSULIN GLARGINE 10 UNITS: 100 INJECTION, SOLUTION SUBCUTANEOUS at 21:42

## 2024-03-14 RX ADMIN — OXYCODONE AND ACETAMINOPHEN 1 TABLET: 7.5; 325 TABLET ORAL at 17:54

## 2024-03-14 RX ADMIN — SODIUM CHLORIDE, PRESERVATIVE FREE 10 ML: 5 INJECTION INTRAVENOUS at 09:49

## 2024-03-14 RX ADMIN — CALCIUM ACETATE 667 MG: 667 CAPSULE ORAL at 09:45

## 2024-03-14 RX ADMIN — SODIUM CHLORIDE, PRESERVATIVE FREE 10 ML: 5 INJECTION INTRAVENOUS at 21:42

## 2024-03-14 RX ADMIN — CALCITRIOL CAPSULES 0.25 MCG 0.5 MCG: 0.25 CAPSULE ORAL at 21:23

## 2024-03-14 RX ADMIN — MEROPENEM 500 MG: 500 INJECTION, POWDER, FOR SOLUTION INTRAVENOUS at 21:45

## 2024-03-14 RX ADMIN — POLYETHYLENE GLYCOL 3350 17 G: 17 POWDER, FOR SOLUTION ORAL at 22:12

## 2024-03-14 ASSESSMENT — PAIN DESCRIPTION - ORIENTATION
ORIENTATION: LEFT
ORIENTATION: RIGHT
ORIENTATION: RIGHT
ORIENTATION: LEFT

## 2024-03-14 ASSESSMENT — PAIN SCALES - GENERAL
PAINLEVEL_OUTOF10: 8
PAINLEVEL_OUTOF10: 7
PAINLEVEL_OUTOF10: 8
PAINLEVEL_OUTOF10: 7

## 2024-03-14 ASSESSMENT — PAIN DESCRIPTION - DESCRIPTORS
DESCRIPTORS: THROBBING
DESCRIPTORS: STABBING;THROBBING
DESCRIPTORS: THROBBING
DESCRIPTORS: ACHING;THROBBING

## 2024-03-14 ASSESSMENT — PAIN - FUNCTIONAL ASSESSMENT
PAIN_FUNCTIONAL_ASSESSMENT: ACTIVITIES ARE NOT PREVENTED

## 2024-03-14 ASSESSMENT — PAIN DESCRIPTION - PAIN TYPE
TYPE: CHRONIC PAIN;SURGICAL PAIN
TYPE: CHRONIC PAIN
TYPE: CHRONIC PAIN;SURGICAL PAIN

## 2024-03-14 ASSESSMENT — PAIN DESCRIPTION - LOCATION
LOCATION: FOOT
LOCATION: HEAD;FOOT

## 2024-03-14 ASSESSMENT — PAIN SCALES - WONG BAKER: WONGBAKER_NUMERICALRESPONSE: HURTS EVEN MORE

## 2024-03-14 ASSESSMENT — PAIN DESCRIPTION - FREQUENCY
FREQUENCY: CONTINUOUS
FREQUENCY: CONTINUOUS

## 2024-03-14 ASSESSMENT — PAIN DESCRIPTION - ONSET
ONSET: ON-GOING
ONSET: ON-GOING

## 2024-03-14 NOTE — CONSULTS
[facility] Ostomy Continence Nurse  Consult Note       Yovanny Levine  AGE: 48 y.o.   GENDER: male  : 1975  TODAY'S DATE:  3/14/2024    Subjective:     Reason for  Evaluation and Assessment: wound care eval/npwt dressing placement.       Yovanny Levine is a 48 y.o. male referred by:   [x] Physician  [] Nursing  [] Other:     Wound Identification:  Wound Type: diabetic, pressure, and non-healing surgical  Contributing Factors: edema, diabetes, and chronic pressure        PAST MEDICAL HISTORY        Diagnosis Date    Abscess 2010    scrotal    Acute renal failure (ARF) ([facility]) 2019    Anxiety associated with depression     Anxiety associated with depression     Back pain 2012    CAD (coronary artery disease) 02/10/2023    Chest pain 2013    Diabetic nephropathy ([facility])     Diabetic neuropathy ([facility]) 2011    Diabetic ulcer of left midfoot associated with type 2 diabetes mellitus, with fat layer exposed ([facility]) 2017    Diverticulosis     C scope + Dr. Medina    DM (diabetes mellitus), type 2 ([facility])     DR. 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 Medina    Nausea and vomiting 2019    Necrotizing fasciitis ([facility])     Nose fracture 1988    Panic attacks     Pericarditis     Hospitalized with s/p heart cath normal    Peripheral autonomic neuropathy due to diabetes mellitus ([facility])     Axonal EMG- NCS, 2011    Sebaceous cyst 2011    URI (upper respiratory infection) 2012    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 type 2 diabetes mellitus, with muscle involvement without evidence of necrosis ([facility]) 2021    WD-Wound, surgical, infected, initial encounter 2017    Wrist fracture        PAST SURGICAL HISTORY    Past  wound therapy (veraflo with vashe 15ml soak for 5 minutes every 2 hrs)  Wound 02/20/24 Wound #2 Left plantar-Dressing/Treatment: Open to air  Wound 03/14/24 Foot Left medial foot cluster-Dressing/Treatment: Open to air    Patient in bed agreeable to wound care eval/npwt dressing. Dr Doty saw this patient and asked wound care to eval for npwt dressing. Pt has chronic diabetic/surgical wound to left foot. Eschar areas to medial and distal foot recommend paint with betadine. Lateral foot cleansed with NS measured and pictured has slough tissue/mild odor. Applied veraflo cleanse choice vac dressing x 3 pieces to wound and 2 for bridge with Vashe soak  for 5 minutes every 2 hrs. Suction @ 150mmhg continuous. Wound care to change again on Monday. Pt is generally not at risk for skin breakdown DAMEON rivera.         Specialty Bed Required : no  [] Low Air Loss   [] Pressure Redistribution  [] Fluid Immersion  [] Bariatric  [] Total Pressure Relief  [] Other:     Discharge Plan:  Placement for patient upon discharge: tbd  Hospice Care: no  Patient appropriate for Outpatient Wound Care Center: yes to continue after discharge     Patient/Caregiver Teaching:  Level of patient/caregiver understanding able to: pt voiced understanding.          Electronically signed by Rosie Salvador RN,  on 3/14/2024 at 11:16 AM

## 2024-03-14 NOTE — CARE COORDINATION
This RN case manager received a call from Rosie with Wound Care and she has placed a wound vac on patient. I spoke with patient, and he has his home wound vac at his house from last visit. I have asked that he has family bring in Home Vac so it can be placed at discharge. Patient verbalized understanding. Plan remains home.

## 2024-03-14 NOTE — CONSULTS
History and Physical    Patient Name: Yovanny Levine                              YOB: 1975  Exam Date: 3/13/2024    PCP:  José Luis Izquierdo MD           Referring Physician:  ED    Chief Complaint:  diabetic foot infection    History of Present Illness:  The patient is a 48 y.o. male with a history of diabetic foot infection. He underwent an amputation of the left great toe to the mid-metatarsal with drainage of a plantar abscess and debridement of skin and subcutaneous tissue, muscle and fascia by my partner Dr. Hsieh 9/6/2023. The patient has had ongoing wound problems and difficulty getting to the office for follow-up. He has been seeing Dr. Webber at the wound clinic since his last admission to the hospital. He was seen at the wound clinic on the day of admission and after undergoing debridement he was sent to the emergency department for IV antibiotics. Dr. Webber was consulted from the ER but did not return the phone call and so I was then consulted as I'm covering for Dr. Hsieh. The patient does not have sensation in his foot and so he does not complain of any pain. He denies any fevers or chills recently. He also immediately stated that he will not undergo any amputation of any part of his left foot.  The patient presents with a lateral left foot ulceration which is malodorous. He had an x-ray In the emergency department which does show that he has extensive osteolysis with pathologic fracture and deformity at the fifth metatarsal head and neck of the fifth digit proximal phalanx compatible with osteomyelitis.    Past Medical History:   Diagnosis Date    Abscess 2010    scrotal    Acute renal failure (ARF) ([facility]) 08/04/2019    Anxiety associated with depression     Anxiety associated with depression     Back pain 07/02/2012    CAD (coronary artery disease) 02/10/2023    Chest pain 05/01/2013    Diabetic nephropathy ([facility])     Diabetic neuropathy ([facility]) 12/22/2011    Diabetic ulcer of left

## 2024-03-14 NOTE — PROGRESS NOTES
Irrigating wound vac was applied this am.     Patient asked about debridement, I explained he has minimal tissue overlying that bone on his 5th toe. He again stated he will not have an amputation. Will try the mechanical debridement of the wound vac and hope this wound heals. I do not have anything else to offer. Patient is treated as an outpatient by another provider that he prefers to see. I do not see him as an outpatient. I will continue to follow him this admission but in the future I will defer his care to the provider who is taking care of him.

## 2024-03-14 NOTE — PROGRESS NOTES
PHARMACY VANCOMYCIN MONITORING SERVICE  Pharmacy consulted by Dr. Stuart for monitoring and adjustment.    Indication for treatment: Vancomycin indication: Bone/Joint infection   Goal trough: Trough Goal: 15-20 mcg/mL  AUC/PAMELA: 400-600    Risk Factors for MRSA Identified:   Hospitalization within the past 90 days, Received IV antibiotics within the past 90 days, Patient on hemodialysis, Purulent and/or complicated SSTI    Pertinent Laboratory Values:   Temp Readings from Last 3 Encounters:   03/13/24 97.8 °F (36.6 °C) (Oral)   03/12/24 98.7 °F (37.1 °C) (Temporal)   03/05/24 98.2 °F (36.8 °C) (Temporal)     Recent Labs     03/12/24  1850 03/13/24  0535 03/14/24  0402   WBC 8.8 7.3 5.9   LACTATE 1.9  --   --      Recent Labs     03/12/24  1850 03/13/24  0535 03/14/24  0402   BUN 53* 62* 43*   CREATININE 7.1* 8.1* 6.3*     Estimated Creatinine Clearance: 18 mL/min (A) (based on SCr of 6.3 mg/dL (H)).    Intake/Output Summary (Last 24 hours) at 3/14/2024 1322  Last data filed at 3/14/2024 1230  Gross per 24 hour   Intake 1915.17 ml   Output 3200 ml   Net -1284.83 ml     Last Encounter Weight:  Wt Readings from Last 3 Encounters:   03/12/24 115.7 kg (255 lb)   02/12/24 114.4 kg (252 lb 3.2 oz)   01/10/24 114.3 kg (252 lb)     Pertinent Cultures:   Date    Source    Results  03/12   Blood    NGTD x48h    Assessment:  HPI: 48 y.o. male with history of CAD, HTN, DM type II, HLD, and diabetic foot ulcer.  Patient presented to ED with an open wound on his left foot.  X-ray of left foot shows progressive extensive osteomyelitis.  3/13 - Patient refuses any amputation.  Local wound and antibiotics to continue.  ID consulted.  ESRD on HD on M/W/F, Pt received 3 hours of dialysis yesterday and 1500mg vancomycin post HD  Day(s) of therapy: 3  Vancomycin concentration:   3/15 - random level at 06:00     Plan:  Continue intermittent vanco dosing based on pre-dialysis levels  Received 1500mg vancomycin post HD yesterday, no dose  indicated today  Check the vanco level on Friday pre-HD.  Pharmacy will continue to monitor patient and adjust therapy as indicated    VANCOMYCIN CONCENTRATION ORDERED FOR 3/15/24 @06:00    Thank you for the consult.  Renetta Metzger RPH  3/14/2024 1:22 PM

## 2024-03-14 NOTE — PROGRESS NOTES
V2.0  [facility] Hospitalist Progress Note      Name:  Yovanny Levine /Age/Sex: 1975  (48 y.o. male)   MRN & CSN:  [id number] & [id number] Encounter Date/Time: 3/14/2024 11:59 AM EDT    Location:  [facility] PCP: José Luis Izquierdo MD       Hospital Day: 3    Assessment and Plan:   Yovanny Levine is a 48 y.o. male with pmh of  coronary artery disease, hypertension, diabetes mellitus type 2, on long-term insulin, diabetic foot ulcer, hyperlipidemia, depression, chronic pain   who presents with Diabetic foot infection (HCC)      Plan:  #.  Diabetic foot infection  -Referred from wound clinic  -General surgery consulted from ED  -X-ray of the left foot-progressive extensive osteolysis with pathological fracture and deformity at the fifth metatarsal head and neck and fifth digit proximal phalanx compatible with osteomyelitis.  Adjacent large soft tissue ulceration, soft tissue edema and air.  -Patient refuses any amputation, agreeable to wound VAC plan to continue local wound care and antibiotics  -Continue broad-spectrum antibiotics- vancomycin, meropenem  -Consult infectious disease  -Patient on Augmentin (2 more doses left)       #.  Recent admission 2024 for sepsis secondary to left foot cellulitis  -Left foot MRI confirmed osteomyelitis-patient underwent incision and drainage, debridement of the left foot     #.  ESRD on HD  -Consult nephrology-Dr Parmar/Dr James  -Patient is on calcium acetate     #.  PUD  -EGD-2023-1 nonbleeding gastric ulcer noted in the gastric fundus.      #.  Coronary artery disease  -History of PCI and stenting to LAD, left circumflex  -[facility]-2022-patent stents  -Patient not taking aspirin   -On atorvastatin, carvedilol-continue     #.  Chronic normocytic anemia  -Likely secondary to end-stage renal disease, hemoglobin 5.9 this a.m. will transfuse 1 unit PRBC and recheck H&H, continue to monitor continue Epogen during dialysis     #.  Hypertension-amlodipine, carvedilol,

## 2024-03-15 LAB
ANION GAP SERPL CALCULATED.3IONS-SCNC: 17 MMOL/L (ref 7–16)
B PARAP IS1001 DNA NPH QL NAA+NON-PROBE: NOT DETECTED
B PERT.PT PRMT NPH QL NAA+NON-PROBE: NOT DETECTED
BASOPHILS ABSOLUTE: 0 K/CU MM
BASOPHILS RELATIVE PERCENT: 0.3 % (ref 0–1)
BUN SERPL-MCNC: 68 MG/DL (ref 6–23)
C PNEUM DNA NPH QL NAA+NON-PROBE: NOT DETECTED
CALCIUM SERPL-MCNC: 7.8 MG/DL (ref 8.3–10.6)
CHLORIDE BLD-SCNC: 95 MMOL/L (ref 99–110)
CO2: 24 MMOL/L (ref 21–32)
CREAT SERPL-MCNC: 8.5 MG/DL (ref 0.9–1.3)
DIFFERENTIAL TYPE: ABNORMAL
DOSE AMOUNT: NORMAL
DOSE TIME: NORMAL
EOSINOPHILS ABSOLUTE: 0.2 K/CU MM
EOSINOPHILS RELATIVE PERCENT: 2.8 % (ref 0–3)
FLUAV H1 2009 PAN RNA NPH NAA+NON-PROBE: NOT DETECTED
FLUAV H1 RNA NPH QL NAA+NON-PROBE: NOT DETECTED
FLUAV H3 RNA NPH QL NAA+NON-PROBE: NOT DETECTED
FLUAV RNA NPH QL NAA+NON-PROBE: NOT DETECTED
FLUBV RNA NPH QL NAA+NON-PROBE: NOT DETECTED
GFR SERPL CREATININE-BSD FRML MDRD: 7 ML/MIN/1.73M2
GLUCOSE BLD-MCNC: 139 MG/DL (ref 70–99)
GLUCOSE BLD-MCNC: 185 MG/DL (ref 70–99)
GLUCOSE BLD-MCNC: 187 MG/DL (ref 70–99)
GLUCOSE BLD-MCNC: 222 MG/DL (ref 70–99)
GLUCOSE SERPL-MCNC: 151 MG/DL (ref 70–99)
HADV DNA NPH QL NAA+NON-PROBE: NOT DETECTED
HCOV 229E RNA NPH QL NAA+NON-PROBE: NOT DETECTED
HCOV HKU1 RNA NPH QL NAA+NON-PROBE: NOT DETECTED
HCOV NL63 RNA NPH QL NAA+NON-PROBE: NOT DETECTED
HCOV OC43 RNA NPH QL NAA+NON-PROBE: NOT DETECTED
HCT VFR BLD CALC: 21.8 % (ref 42–52)
HCT VFR BLD CALC: 24.6 % (ref 42–52)
HEMOGLOBIN: 6.5 GM/DL (ref 13.5–18)
HEMOGLOBIN: 7.7 GM/DL (ref 13.5–18)
HMPV RNA NPH QL NAA+NON-PROBE: NOT DETECTED
HPIV1 RNA NPH QL NAA+NON-PROBE: NOT DETECTED
HPIV2 RNA NPH QL NAA+NON-PROBE: NOT DETECTED
HPIV3 RNA NPH QL NAA+NON-PROBE: NOT DETECTED
HPIV4 RNA NPH QL NAA+NON-PROBE: NOT DETECTED
IMMATURE NEUTROPHIL %: 0.7 % (ref 0–0.43)
LYMPHOCYTES ABSOLUTE: 0.8 K/CU MM
LYMPHOCYTES RELATIVE PERCENT: 10.9 % (ref 24–44)
M PNEUMO DNA NPH QL NAA+NON-PROBE: NOT DETECTED
MCH RBC QN AUTO: 29.7 PG (ref 27–31)
MCHC RBC AUTO-ENTMCNC: 29.8 % (ref 32–36)
MCV RBC AUTO: 99.5 FL (ref 78–100)
MONOCYTES ABSOLUTE: 0.5 K/CU MM
MONOCYTES RELATIVE PERCENT: 7.8 % (ref 0–4)
NUCLEATED RBC %: 0 %
PDW BLD-RTO: 16.2 % (ref 11.7–14.9)
PLATELET # BLD: 154 K/CU MM (ref 140–440)
PMV BLD AUTO: 10.5 FL (ref 7.5–11.1)
POTASSIUM SERPL-SCNC: 4.9 MMOL/L (ref 3.5–5.1)
RBC # BLD: 2.19 M/CU MM (ref 4.6–6.2)
RSV RNA NPH QL NAA+NON-PROBE: ABNORMAL
RV+EV RNA NPH QL NAA+NON-PROBE: NOT DETECTED
SARS-COV-2 RNA NPH QL NAA+NON-PROBE: NOT DETECTED
SEGMENTED NEUTROPHILS ABSOLUTE COUNT: 5.3 K/CU MM
SEGMENTED NEUTROPHILS RELATIVE PERCENT: 77.5 % (ref 36–66)
SODIUM BLD-SCNC: 136 MMOL/L (ref 135–145)
TOTAL IMMATURE NEUTOROPHIL: 0.05 K/CU MM
TOTAL NUCLEATED RBC: 0 K/CU MM
VANCOMYCIN RANDOM: 25.8 UG/ML
WBC # BLD: 6.9 K/CU MM (ref 4–10.5)

## 2024-03-15 PROCEDURE — 85025 COMPLETE CBC W/AUTO DIFF WBC: CPT

## 2024-03-15 PROCEDURE — 6370000000 HC RX 637 (ALT 250 FOR IP): Performed by: INTERNAL MEDICINE

## 2024-03-15 PROCEDURE — 2500000003 HC RX 250 WO HCPCS: Performed by: INTERNAL MEDICINE

## 2024-03-15 PROCEDURE — 2580000003 HC RX 258: Performed by: INTERNAL MEDICINE

## 2024-03-15 PROCEDURE — 80048 BASIC METABOLIC PNL TOTAL CA: CPT

## 2024-03-15 PROCEDURE — 36415 COLL VENOUS BLD VENIPUNCTURE: CPT

## 2024-03-15 PROCEDURE — 90935 HEMODIALYSIS ONE EVALUATION: CPT

## 2024-03-15 PROCEDURE — 85018 HEMOGLOBIN: CPT

## 2024-03-15 PROCEDURE — 6360000002 HC RX W HCPCS: Performed by: INTERNAL MEDICINE

## 2024-03-15 PROCEDURE — 6370000000 HC RX 637 (ALT 250 FOR IP): Performed by: STUDENT IN AN ORGANIZED HEALTH CARE EDUCATION/TRAINING PROGRAM

## 2024-03-15 PROCEDURE — P9016 RBC LEUKOCYTES REDUCED: HCPCS

## 2024-03-15 PROCEDURE — 6360000002 HC RX W HCPCS: Performed by: NURSE PRACTITIONER

## 2024-03-15 PROCEDURE — 80202 ASSAY OF VANCOMYCIN: CPT

## 2024-03-15 PROCEDURE — 99232 SBSQ HOSP IP/OBS MODERATE 35: CPT | Performed by: INTERNAL MEDICINE

## 2024-03-15 PROCEDURE — 94761 N-INVAS EAR/PLS OXIMETRY MLT: CPT

## 2024-03-15 PROCEDURE — 85014 HEMATOCRIT: CPT

## 2024-03-15 PROCEDURE — 94640 AIRWAY INHALATION TREATMENT: CPT

## 2024-03-15 PROCEDURE — 1200000000 HC SEMI PRIVATE

## 2024-03-15 PROCEDURE — 82962 GLUCOSE BLOOD TEST: CPT

## 2024-03-15 RX ORDER — LOPERAMIDE HYDROCHLORIDE 2 MG/1
2 CAPSULE ORAL 4 TIMES DAILY PRN
Status: DISCONTINUED | OUTPATIENT
Start: 2024-03-15 | End: 2024-03-20 | Stop reason: HOSPADM

## 2024-03-15 RX ORDER — SODIUM CHLORIDE 9 MG/ML
INJECTION, SOLUTION INTRAVENOUS PRN
Status: COMPLETED | OUTPATIENT
Start: 2024-03-15 | End: 2024-03-17

## 2024-03-15 RX ADMIN — Medication 5000 UNITS: at 10:27

## 2024-03-15 RX ADMIN — INSULIN GLARGINE 10 UNITS: 100 INJECTION, SOLUTION SUBCUTANEOUS at 21:27

## 2024-03-15 RX ADMIN — MEROPENEM 500 MG: 500 INJECTION, POWDER, FOR SOLUTION INTRAVENOUS at 21:24

## 2024-03-15 RX ADMIN — OXYCODONE AND ACETAMINOPHEN 1 TABLET: 7.5; 325 TABLET ORAL at 01:27

## 2024-03-15 RX ADMIN — ATORVASTATIN CALCIUM 40 MG: 40 TABLET, FILM COATED ORAL at 21:28

## 2024-03-15 RX ADMIN — SODIUM CHLORIDE: 9 INJECTION, SOLUTION INTRAVENOUS at 21:23

## 2024-03-15 RX ADMIN — EPOETIN ALFA-EPBX 10000 UNITS: 10000 INJECTION, SOLUTION INTRAVENOUS; SUBCUTANEOUS at 15:04

## 2024-03-15 RX ADMIN — CALCIUM ACETATE 667 MG: 667 CAPSULE ORAL at 17:05

## 2024-03-15 RX ADMIN — ARIPIPRAZOLE 5 MG: 10 TABLET ORAL at 21:28

## 2024-03-15 RX ADMIN — SODIUM CHLORIDE, PRESERVATIVE FREE 10 ML: 5 INJECTION INTRAVENOUS at 10:30

## 2024-03-15 RX ADMIN — OXYCODONE AND ACETAMINOPHEN 1 TABLET: 7.5; 325 TABLET ORAL at 23:43

## 2024-03-15 RX ADMIN — ALBUTEROL SULFATE 2.5 MG: 2.5 SOLUTION RESPIRATORY (INHALATION) at 10:41

## 2024-03-15 RX ADMIN — LOPERAMIDE HYDROCHLORIDE 2 MG: 2 CAPSULE ORAL at 10:29

## 2024-03-15 RX ADMIN — OXYCODONE AND ACETAMINOPHEN 1 TABLET: 7.5; 325 TABLET ORAL at 10:29

## 2024-03-15 RX ADMIN — HYDRALAZINE HYDROCHLORIDE 25 MG: 25 TABLET ORAL at 23:43

## 2024-03-15 RX ADMIN — OXYCODONE AND ACETAMINOPHEN 1 TABLET: 7.5; 325 TABLET ORAL at 17:05

## 2024-03-15 RX ADMIN — SODIUM CHLORIDE, PRESERVATIVE FREE 10 ML: 5 INJECTION INTRAVENOUS at 21:20

## 2024-03-15 RX ADMIN — CALCITRIOL CAPSULES 0.25 MCG 0.5 MCG: 0.25 CAPSULE ORAL at 21:28

## 2024-03-15 RX ADMIN — CALCIUM ACETATE 667 MG: 667 CAPSULE ORAL at 10:28

## 2024-03-15 RX ADMIN — ALBUTEROL SULFATE 2.5 MG: 2.5 SOLUTION RESPIRATORY (INHALATION) at 00:03

## 2024-03-15 RX ADMIN — CALCIUM ACETATE 667 MG: 667 CAPSULE ORAL at 12:13

## 2024-03-15 RX ADMIN — CALCITRIOL CAPSULES 0.25 MCG 0.5 MCG: 0.25 CAPSULE ORAL at 10:27

## 2024-03-15 ASSESSMENT — PAIN DESCRIPTION - DESCRIPTORS
DESCRIPTORS: SHARP
DESCRIPTORS: ACHING;THROBBING
DESCRIPTORS: SHARP
DESCRIPTORS: SHARP
DESCRIPTORS: THROBBING

## 2024-03-15 ASSESSMENT — PAIN DESCRIPTION - ORIENTATION
ORIENTATION: LEFT

## 2024-03-15 ASSESSMENT — PAIN SCALES - GENERAL
PAINLEVEL_OUTOF10: 8
PAINLEVEL_OUTOF10: 8
PAINLEVEL_OUTOF10: 0
PAINLEVEL_OUTOF10: 8
PAINLEVEL_OUTOF10: 7
PAINLEVEL_OUTOF10: 8
PAINLEVEL_OUTOF10: 6
PAINLEVEL_OUTOF10: 6
PAINLEVEL_OUTOF10: 5

## 2024-03-15 ASSESSMENT — PAIN DESCRIPTION - LOCATION
LOCATION: FOOT

## 2024-03-15 ASSESSMENT — PAIN DESCRIPTION - ONSET
ONSET: ON-GOING

## 2024-03-15 ASSESSMENT — PAIN - FUNCTIONAL ASSESSMENT
PAIN_FUNCTIONAL_ASSESSMENT: ACTIVITIES ARE NOT PREVENTED
PAIN_FUNCTIONAL_ASSESSMENT: ACTIVITIES ARE NOT PREVENTED
PAIN_FUNCTIONAL_ASSESSMENT: PREVENTS OR INTERFERES SOME ACTIVE ACTIVITIES AND ADLS
PAIN_FUNCTIONAL_ASSESSMENT: PREVENTS OR INTERFERES SOME ACTIVE ACTIVITIES AND ADLS

## 2024-03-15 ASSESSMENT — PAIN DESCRIPTION - FREQUENCY
FREQUENCY: CONTINUOUS

## 2024-03-15 ASSESSMENT — PAIN SCALES - WONG BAKER: WONGBAKER_NUMERICALRESPONSE: HURTS LITTLE MORE

## 2024-03-15 ASSESSMENT — PAIN DESCRIPTION - PAIN TYPE: TYPE: CHRONIC PAIN

## 2024-03-15 NOTE — PROGRESS NOTES
7.4  Bicarbonate Concentrate Lot No.: [id number]  Acid Concentrate Lot No.: [id number]  Manual Ph: 7.4  Bleach Test (Neg): Yes  Bath Temperature: 95 °F (35 °C)  Tubing Lot#: [id number]  Conductivity Meter Serial #: [id number]  All Connections Secure?: Yes  Venous Parameters Set?: Yes  Arterial Parameters Set?: Yes  Saline Line Double Clamped?: Yes  Air Foam Detector Engaged?: Yes  Machine Functioning Alarm Free? Yes  Prime Given: 200ml    Chlorine Testing - Before each treatment and every 4 hours:   Treatment  Treatment Number: 1  Time On: 1308  Time Off: 1552  Weight - Scale: 115.7 kg (255 lb) (03/12/24 1949)  1st check: less than 0.1 ppm at: 1030 hours  2nd check: less than 0.1 ppm at: 1420 hours  3rd check: Not Applicable  (if greater than 0.1 ppm, then check every 30 minutes from secondary)    Access Flows and Pressures  Patient Vitals for the past 8 hrs:   Blood Flow Rate (ml/min) Ultrafiltration Rate (ml/hr) Ultrafiltration Removed (ml) Arterial Pressure (mmHg) Venous Pressure (mmHg) TMP DFR Comments Access Visible   03/15/24 1308 350 ml/min 1170 ml/hr 0 ml -30 mmHg 60 90 700 tx started, blood transfusion started Yes   03/15/24 1315 350 ml/min 1170 ml/hr 166 ml -40 mmHg 70 90 700 ID MD at bedside Yes   03/15/24 1330 350 ml/min 1170 ml/hr 452 ml -40 mmHg 80 100 700 pt son visiting at bedside Yes   03/15/24 1335 -- -- -- -- -- -- -- UF goal increased to 3800 to account for blood. blood transfusion completed --   03/15/24 1345 350 ml/min 1280 ml/hr 749 ml -60 mmHg 230 120 700 pt awake and talking on phone Yes   03/15/24 1400 350 ml/min 1280 ml/hr 1154 ml -130 mmHg 100 110 700 pt resting with eyes closed Yes   03/15/24 1415 350 ml/min 1280 ml/hr 1357 ml -60 mmHg 80 100 700 family in visiting with pt Yes   03/15/24 1430 350 ml/min 1280 ml/hr 1705 ml -60 mmHg 90 100 700 visiting with family Yes   03/15/24 1445 350 ml/min 1280 ml/hr 2009 ml -70 mmHg 90 100 700 pt awake and interacting with family Yes   03/15/24 1500 300 ml/min  None  Preferred method of Learning: Oral  Topic(s): Access Care, Signs and Symptoms of Infection, Fluid Management, Albumin, Procedural, Medications, Treatment Options, Potassium, Diet, and Transplant   Teaching Tools: Explanation   Response to Education: Verbalized Understanding     Electronically signed by Lola Marx RN on 3/15/2024 at 4:24 PM

## 2024-03-15 NOTE — PROGRESS NOTES
V2.0  [facility] Hospitalist Progress Note      Name:  Yovanny Levine /Age/Sex: 1975  (48 y.o. male)   MRN & CSN:  [id number] & [id number] Encounter Date/Time: 3/15/2024 11:59 AM EDT    Location:  [facility] PCP: José Luis Izquierdo MD       Hospital Day: 4    Assessment and Plan:   Yovanny Levine is a 48 y.o. male with pmh of  coronary artery disease, hypertension, diabetes mellitus type 2, on long-term insulin, diabetic foot ulcer, hyperlipidemia, depression, chronic pain   who presents with Diabetic foot infection (HCC)      Plan:  #.  Diabetic foot infection  -Referred from wound clinic  -General surgery consulted from ED  -X-ray of the left foot-progressive extensive osteolysis with pathological fracture and deformity at the fifth metatarsal head and neck and fifth digit proximal phalanx compatible with osteomyelitis.  Adjacent large soft tissue ulceration, soft tissue edema and air.  -Patient refuses any amputation, agreeable to wound VAC plan to continue local wound care and antibiotics  -Continue broad-spectrum antibiotics- vancomycin, meropenem will discuss with ID if antibiotics can be narrowed down  -Consult infectious disease  -Patient on Augmentin (2 more doses left)       #.  Recent admission 2024 for sepsis secondary to left foot cellulitis  -Left foot MRI confirmed osteomyelitis-patient underwent incision and drainage, debridement of the left foot     #.  ESRD on HD  -Consult nephrology-Dr Parmar/Dr James  -Patient is on calcium acetate     #.  PUD  -EGD-2023-1 nonbleeding gastric ulcer noted in the gastric fundus.      #.  Coronary artery disease  -History of PCI and stenting to LAD, left circumflex  -[facility]-2022-patent stents  -Patient not taking aspirin   -On atorvastatin, carvedilol-continue     #.  Chronic normocytic anemia  -Likely secondary to end-stage renal disease, hemoglobin 6.5 this morning will order 1 more unit of PRBC has already received 1 unit this admission, will get Epogen

## 2024-03-15 NOTE — PROGRESS NOTES
PHARMACY VANCOMYCIN MONITORING SERVICE  Pharmacy consulted by Dr. Sturat for monitoring and adjustment.    Indication for treatment: Vancomycin indication: Bone/Joint infection   Goal trough: Trough Goal: 15-20 mcg/mL  AUC/PAMELA: 400-600    Risk Factors for MRSA Identified:   Hospitalization within the past 90 days, Received IV antibiotics within the past 90 days, Patient on hemodialysis, Purulent and/or complicated SSTI    Pertinent Laboratory Values:   Temp Readings from Last 3 Encounters:   03/15/24 98.1 °F (36.7 °C) (Oral)   03/12/24 98.7 °F (37.1 °C) (Temporal)   03/05/24 98.2 °F (36.8 °C) (Temporal)     Recent Labs     03/12/24  1850 03/13/24  0535 03/14/24  0402 03/15/24  0843   WBC 8.8 7.3 5.9 6.9   LACTATE 1.9  --   --   --        Recent Labs     03/13/24  0535 03/14/24  0402 03/15/24  0843   BUN 62* 43* 68*   CREATININE 8.1* 6.3* 8.5*       Estimated Creatinine Clearance: 13 mL/min (A) (based on SCr of 8.5 mg/dL (H)).    Intake/Output Summary (Last 24 hours) at 3/15/2024 1250  Last data filed at 3/15/2024 1213  Gross per 24 hour   Intake 120 ml   Output --   Net 120 ml       Last Encounter Weight:  Wt Readings from Last 3 Encounters:   03/12/24 115.7 kg (255 lb)   02/12/24 114.4 kg (252 lb 3.2 oz)   01/10/24 114.3 kg (252 lb)     Pertinent Cultures:   Date    Source    Results  03/12   Blood    NGTD x48h    VANCOMYCIN TROUGH:  No results for input(s): \"VANCOTROUGH\" in the last 72 hours.  VANCOMYCIN RANDOM:    Recent Labs     03/15/24  0843   VANCORANDOM 25.8     Assessment:  HPI: 48 y.o. male with history of CAD, HTN, DM type II, HLD, and diabetic foot ulcer.  Patient presented to ED with an open wound on his left foot.  X-ray of left foot shows progressive extensive osteomyelitis.  3/13 - Patient refuses any amputation.  Local wound and antibiotics to continue.  ID consulted.  3/14 - ID notes to continue vancomycin.  ESRD on HD on M/W/F, Pt having dialysis today.   Day(s) of therapy: 4  Vancomycin

## 2024-03-15 NOTE — PROGRESS NOTES
Nephrology Progress Note        [address] [facility] [facility], [address]  [place] [address]  Phone: [phone]  Office Hours: 8:30AM - 4:30PM  Monday - Friday        3/15/2024 6:43 AM  Subjective:   Admit Date: 3/12/2024  PCP: José Luis Izquierdo MD  Interval History:   Now with RSV  On room air    Diet: ADULT DIET; Regular; 4 carb choices (60 gm/meal); 1500 ml      Data:   Scheduled Meds:   vancomycin (VANCOCIN) intermittent dosing (placeholder)   Other RX Placeholder    calcitRIOL  0.5 mcg Oral BID    Vitamin D  5,000 Units Oral Daily    epoetin paola-epbx  10,000 Units IntraVENous Once per day on Mon Wed Fri    sodium chloride flush  5-40 mL IntraVENous BID    [Held by provider] heparin (porcine)  5,000 Units SubCUTAneous 3 times per day    insulin lispro  0-8 Units SubCUTAneous TID WC    insulin lispro  0-4 Units SubCUTAneous Nightly    amLODIPine  5 mg Oral Daily    ARIPiprazole  5 mg Oral Nightly    atorvastatin  40 mg Oral Nightly    calcium acetate  1 capsule Oral TID WC    carvedilol  25 mg Oral BID WC    hydrALAZINE  25 mg Oral 3 times per day    insulin glargine  10 Units SubCUTAneous Nightly    meropenem  500 mg IntraVENous Q24H     Continuous Infusions:   sodium chloride      dextrose       PRN Meds:guaiFENesin, albuterol, sodium chloride flush, sodium chloride, ondansetron **OR** ondansetron, polyethylene glycol, acetaminophen **OR** acetaminophen, glucose, dextrose bolus **OR** dextrose bolus, glucagon (rDNA), dextrose, oxyCODONE-acetaminophen  I/O last 3 completed shifts:  In: 2155.2 [P.O.:1200; Blood:455.2]  Out: 3200 [Urine:200]  No intake/output data recorded.    Intake/Output Summary (Last 24 hours) at 3/15/2024 0643  Last data filed at 3/14/2024 1230  Gross per 24 hour   Intake 480 ml   Output --   Net 480 ml       CBC:   Recent Labs     03/12/24  1850 03/13/24  0535 03/13/24  1455 03/14/24  0402   WBC 8.8 7.3  --  5.9   HGB 7.0* 5.9* 6.9* 7.0*    154  --  145       BMP:    Recent Labs

## 2024-03-15 NOTE — PROGRESS NOTES
03/15/24 1500   Encounter Summary   Encounter Overview/Reason  Initial Encounter   Service Provided For: Patient   [facility]   Last Encounter  03/15/24  (PT doing dialysis but he wanted prayer, he appreciates his caregivers, he has support with family.)   Complexity of Encounter Low   Begin Time 1450   End Time  1501   Total Time Calculated 11 min   Spiritual/Emotional needs   Type Spiritual Support   Assessment/Intervention/Outcome   Assessment Calm;Concerns with suffering;Coping   Intervention Active listening;Discussed illness injury and it’s impact;Explored/Affirmed feelings, thoughts, concerns;Prayer (assurance of)/[place];Sustaining Presence/Ministry of presence   Outcome Acceptance;Coping;Expressed feelings, needs, and concerns;Expressed Gratitude   Plan and Referrals   Plan/Referrals Continue Support (comment)

## 2024-03-15 NOTE — PROGRESS NOTES
Infectious Disease Progress Note  3/15/2024   Patient Name: Yovanny Levine : 1975     Assessment  Left foot diabetic ulcer and 5th metatarsal osteomyelitis  Long hx of ulcer and x-ray revealed a pathologic fracture of the metatarsal head, neck and fifth digit proximal phalanx compatible with osteomyelitis  Antibiotics alone will not guarantee cure.  Agree with general surgery Dr. Doty evaluation and the patient will need amputation.  However, he declines it at this time.  Wound culture from 2024 was positive for Proteus penneri, Proteus vulgaris, Enterococcus faecalis, Finegoldia magna.  Was treated with a 6-week course of Augmentin.  RSV URTI  Experiencing cough and tested positive for RSV on 3/14/2024  ESRD on hemodialysis  Type 2 diabetes mellitus with polyneuropathy  Class II obesity  Coronary artery disease  Diverticulosis  Comorbid conditions:      Plan  Therapeutic:  Vancomycin and meropenem  Anticipate a 6-week course of antibiotics  Diagnostic:  CRP, ESR  Wound culture  F/u:  3/12/2024: Blood culture x 2  Othe    Reason for visit: F/u left diabetic foot ulcer and fifth metatarsal osteomyelitis  History:?Interval history noted  Denies n/v/d/f or untoward effects of antimicrobials  Physical Exam:  Vital Signs: /69   Pulse 68   Temp 98.1 °F (36.7 °C) (Oral)   Resp 18   Ht 1.753 m (5' 9\")   Wt 115.7 kg (255 lb)   SpO2 95%   BMI 37.66 kg/m²     Gen: alert and oriented X3, no distress  Skin: no stigmata of endocarditis  Wounds: Left foot wound VAC   HEMT: AT/NC Oropharynx pink, moist, and without lesions or exudates; dentition in good state of repair  Eyes: PERRLA, EOMI, conjunctiva pink, sclera anicteric.   Neck: Supple. Trachea midline. No LAD.  Chest: no distress and CTA. Good air movement.  Heart: RRR and no MRG.   Abd: soft, non-distended, no tenderness, no hepatomegaly. Normoactive bowel sounds.  Ext: no clubbing, cyanosis, or edema  Catheter Site: without erythema or  by PCR NOT DETECTED NOT DETECTED   POCT Glucose    Collection Time: 03/15/24  8:15 AM   Result Value Ref Range    POC Glucose 222 (H) 70 - 99 MG/DL   CBC with Auto Differential    Collection Time: 03/15/24  8:43 AM   Result Value Ref Range    WBC 6.9 4.0 - 10.5 K/CU MM    RBC 2.19 (L) 4.6 - 6.2 M/CU MM    Hemoglobin 6.5 (LL) 13.5 - 18.0 GM/DL    Hematocrit 21.8 (L) 42 - 52 %    MCV 99.5 78 - 100 FL    MCH 29.7 27 - 31 PG    MCHC 29.8 (L) 32.0 - 36.0 %    RDW 16.2 (H) 11.7 - 14.9 %    Platelets 154 140 - 440 K/CU MM    MPV 10.5 7.5 - 11.1 FL    Differential Type AUTOMATED DIFFERENTIAL     Segs Relative 77.5 (H) 36 - 66 %    Lymphocytes % 10.9 (L) 24 - 44 %    Monocytes % 7.8 (H) 0 - 4 %    Eosinophils % 2.8 0 - 3 %    Basophils % 0.3 0 - 1 %    Segs Absolute 5.3 K/CU MM    Lymphocytes Absolute 0.8 K/CU MM    Monocytes Absolute 0.5 K/CU MM    Eosinophils Absolute 0.2 K/CU MM    Basophils Absolute 0.0 K/CU MM    Nucleated RBC % 0.0 %    Total Nucleated RBC 0.0 K/CU MM    Total Immature Neutrophil 0.05 K/CU MM    Immature Neutrophil % 0.7 (H) 0 - 0.43 %   Basic Metabolic Panel    Collection Time: 03/15/24  8:43 AM   Result Value Ref Range    Sodium 136 135 - 145 MMOL/L    Potassium 4.9 3.5 - 5.1 MMOL/L    Chloride 95 (L) 99 - 110 mMol/L    CO2 24 21 - 32 MMOL/L    Anion Gap 17 (H) 7 - 16    Glucose 151 (H) 70 - 99 MG/DL    BUN 68 (H) 6 - 23 MG/DL    Creatinine 8.5 (H) 0.9 - 1.3 MG/DL    Est, Glom Filt Rate 7 (L) >60 mL/min/1.73m2    Calcium 7.8 (L) 8.3 - 10.6 MG/DL   Vancomycin Level, Random    Collection Time: 03/15/24  8:43 AM   Result Value Ref Range    Vancomycin Rm 25.8 UG/ML    DOSE AMOUNT DOSE AMT. GIVEN - 1500mg     DOSE TIME DOSE TIME GIVEN - 3/13 @16:00      CULTURE results: Invalid input(s): \"BLOOD CULTURE\", \"URINE CULTURE\", \"SURGICAL CULTURE\"    Diagnosis:  Patient Active Problem List   Diagnosis    Hiatal hernia    Diabetes mellitus type 2, improved controlled    Diabetic neuropathy (HCC)    Panic attack

## 2024-03-16 LAB
ABO/RH: NORMAL
ANION GAP SERPL CALCULATED.3IONS-SCNC: 14 MMOL/L (ref 7–16)
ANTIBODY SCREEN: NEGATIVE
BASOPHILS ABSOLUTE: 0 K/CU MM
BASOPHILS RELATIVE PERCENT: 0.4 % (ref 0–1)
BUN SERPL-MCNC: 56 MG/DL (ref 6–23)
CALCIUM SERPL-MCNC: 8.4 MG/DL (ref 8.3–10.6)
CHLORIDE BLD-SCNC: 101 MMOL/L (ref 99–110)
CO2: 25 MMOL/L (ref 21–32)
COMPONENT: NORMAL
COMPONENT: NORMAL
CREAT SERPL-MCNC: 7.8 MG/DL (ref 0.9–1.3)
CROSSMATCH RESULT: NORMAL
CROSSMATCH RESULT: NORMAL
CRP SERPL HS-MCNC: 43.9 MG/L
DIFFERENTIAL TYPE: ABNORMAL
EOSINOPHILS ABSOLUTE: 0.1 K/CU MM
EOSINOPHILS RELATIVE PERCENT: 2.5 % (ref 0–3)
ERYTHROCYTE SEDIMENTATION RATE: 61 MM/HR (ref 0–15)
GFR SERPL CREATININE-BSD FRML MDRD: 8 ML/MIN/1.73M2
GLUCOSE BLD-MCNC: 170 MG/DL (ref 70–99)
GLUCOSE BLD-MCNC: 176 MG/DL (ref 70–99)
GLUCOSE BLD-MCNC: 180 MG/DL (ref 70–99)
GLUCOSE SERPL-MCNC: 138 MG/DL (ref 70–99)
HCT VFR BLD CALC: 22.5 % (ref 42–52)
HCT VFR BLD CALC: 24.2 % (ref 42–52)
HEMOGLOBIN: 7 GM/DL (ref 13.5–18)
HEMOGLOBIN: 7.5 GM/DL (ref 13.5–18)
IMMATURE NEUTROPHIL %: 0.7 % (ref 0–0.43)
LYMPHOCYTES ABSOLUTE: 0.7 K/CU MM
LYMPHOCYTES RELATIVE PERCENT: 12.1 % (ref 24–44)
MCH RBC QN AUTO: 29.9 PG (ref 27–31)
MCHC RBC AUTO-ENTMCNC: 31.1 % (ref 32–36)
MCV RBC AUTO: 96.2 FL (ref 78–100)
MONOCYTES ABSOLUTE: 0.5 K/CU MM
MONOCYTES RELATIVE PERCENT: 9.6 % (ref 0–4)
NUCLEATED RBC %: 0.4 %
PDW BLD-RTO: 16.9 % (ref 11.7–14.9)
PLATELET # BLD: 128 K/CU MM (ref 140–440)
PMV BLD AUTO: 10.2 FL (ref 7.5–11.1)
POTASSIUM SERPL-SCNC: 5.1 MMOL/L (ref 3.5–5.1)
RBC # BLD: 2.34 M/CU MM (ref 4.6–6.2)
SEGMENTED NEUTROPHILS ABSOLUTE COUNT: 4.1 K/CU MM
SEGMENTED NEUTROPHILS RELATIVE PERCENT: 74.7 % (ref 36–66)
SODIUM BLD-SCNC: 140 MMOL/L (ref 135–145)
STATUS: NORMAL
STATUS: NORMAL
TOTAL IMMATURE NEUTOROPHIL: 0.04 K/CU MM
TOTAL NUCLEATED RBC: 0 K/CU MM
TRANSFUSION STATUS: NORMAL
TRANSFUSION STATUS: NORMAL
UNIT DIVISION: 0
UNIT DIVISION: 0
UNIT NUMBER: NORMAL
UNIT NUMBER: NORMAL
WBC # BLD: 5.5 K/CU MM (ref 4–10.5)

## 2024-03-16 PROCEDURE — 2580000003 HC RX 258: Performed by: INTERNAL MEDICINE

## 2024-03-16 PROCEDURE — 6370000000 HC RX 637 (ALT 250 FOR IP): Performed by: INTERNAL MEDICINE

## 2024-03-16 PROCEDURE — 36415 COLL VENOUS BLD VENIPUNCTURE: CPT

## 2024-03-16 PROCEDURE — 6360000002 HC RX W HCPCS: Performed by: NURSE PRACTITIONER

## 2024-03-16 PROCEDURE — 85018 HEMOGLOBIN: CPT

## 2024-03-16 PROCEDURE — 2500000003 HC RX 250 WO HCPCS: Performed by: INTERNAL MEDICINE

## 2024-03-16 PROCEDURE — 94640 AIRWAY INHALATION TREATMENT: CPT

## 2024-03-16 PROCEDURE — 1200000000 HC SEMI PRIVATE

## 2024-03-16 PROCEDURE — 82962 GLUCOSE BLOOD TEST: CPT

## 2024-03-16 PROCEDURE — 85025 COMPLETE CBC W/AUTO DIFF WBC: CPT

## 2024-03-16 PROCEDURE — 94761 N-INVAS EAR/PLS OXIMETRY MLT: CPT

## 2024-03-16 PROCEDURE — 85014 HEMATOCRIT: CPT

## 2024-03-16 PROCEDURE — 6360000002 HC RX W HCPCS: Performed by: INTERNAL MEDICINE

## 2024-03-16 PROCEDURE — 80048 BASIC METABOLIC PNL TOTAL CA: CPT

## 2024-03-16 PROCEDURE — 85652 RBC SED RATE AUTOMATED: CPT

## 2024-03-16 PROCEDURE — 86140 C-REACTIVE PROTEIN: CPT

## 2024-03-16 RX ADMIN — HYDRALAZINE HYDROCHLORIDE 25 MG: 25 TABLET ORAL at 16:47

## 2024-03-16 RX ADMIN — SODIUM CHLORIDE, PRESERVATIVE FREE 10 ML: 5 INJECTION INTRAVENOUS at 10:41

## 2024-03-16 RX ADMIN — CALCIUM ACETATE 667 MG: 667 CAPSULE ORAL at 10:38

## 2024-03-16 RX ADMIN — INSULIN GLARGINE 10 UNITS: 100 INJECTION, SOLUTION SUBCUTANEOUS at 21:21

## 2024-03-16 RX ADMIN — ALBUTEROL SULFATE 2.5 MG: 2.5 SOLUTION RESPIRATORY (INHALATION) at 21:00

## 2024-03-16 RX ADMIN — Medication 5000 UNITS: at 10:37

## 2024-03-16 RX ADMIN — CALCIUM ACETATE 667 MG: 667 CAPSULE ORAL at 16:47

## 2024-03-16 RX ADMIN — AMLODIPINE BESYLATE 5 MG: 5 TABLET ORAL at 10:37

## 2024-03-16 RX ADMIN — SODIUM CHLORIDE: 9 INJECTION, SOLUTION INTRAVENOUS at 21:20

## 2024-03-16 RX ADMIN — CARVEDILOL 25 MG: 25 TABLET, FILM COATED ORAL at 10:40

## 2024-03-16 RX ADMIN — ATORVASTATIN CALCIUM 40 MG: 40 TABLET, FILM COATED ORAL at 21:21

## 2024-03-16 RX ADMIN — CARVEDILOL 25 MG: 25 TABLET, FILM COATED ORAL at 16:47

## 2024-03-16 RX ADMIN — CALCIUM ACETATE 667 MG: 667 CAPSULE ORAL at 12:28

## 2024-03-16 RX ADMIN — MEROPENEM 500 MG: 500 INJECTION, POWDER, FOR SOLUTION INTRAVENOUS at 21:21

## 2024-03-16 RX ADMIN — OXYCODONE AND ACETAMINOPHEN 1 TABLET: 7.5; 325 TABLET ORAL at 21:21

## 2024-03-16 RX ADMIN — ALBUTEROL SULFATE 2.5 MG: 2.5 SOLUTION RESPIRATORY (INHALATION) at 13:56

## 2024-03-16 RX ADMIN — CALCITRIOL CAPSULES 0.25 MCG 0.5 MCG: 0.25 CAPSULE ORAL at 10:37

## 2024-03-16 RX ADMIN — CALCITRIOL CAPSULES 0.25 MCG 0.5 MCG: 0.25 CAPSULE ORAL at 21:21

## 2024-03-16 RX ADMIN — ARIPIPRAZOLE 5 MG: 10 TABLET ORAL at 21:21

## 2024-03-16 RX ADMIN — OXYCODONE AND ACETAMINOPHEN 1 TABLET: 7.5; 325 TABLET ORAL at 13:48

## 2024-03-16 RX ADMIN — HYDRALAZINE HYDROCHLORIDE 25 MG: 25 TABLET ORAL at 10:37

## 2024-03-16 RX ADMIN — OXYCODONE AND ACETAMINOPHEN 1 TABLET: 7.5; 325 TABLET ORAL at 06:13

## 2024-03-16 RX ADMIN — SODIUM CHLORIDE, PRESERVATIVE FREE 10 ML: 5 INJECTION INTRAVENOUS at 21:15

## 2024-03-16 ASSESSMENT — PAIN DESCRIPTION - DESCRIPTORS
DESCRIPTORS: BURNING;THROBBING
DESCRIPTORS: OTHER (COMMENT)
DESCRIPTORS: BURNING

## 2024-03-16 ASSESSMENT — PAIN DESCRIPTION - LOCATION
LOCATION: FOOT

## 2024-03-16 ASSESSMENT — PAIN SCALES - GENERAL
PAINLEVEL_OUTOF10: 8
PAINLEVEL_OUTOF10: 6
PAINLEVEL_OUTOF10: 0
PAINLEVEL_OUTOF10: 8
PAINLEVEL_OUTOF10: 8

## 2024-03-16 ASSESSMENT — PAIN DESCRIPTION - ONSET: ONSET: ON-GOING

## 2024-03-16 ASSESSMENT — PAIN DESCRIPTION - ORIENTATION
ORIENTATION: LEFT

## 2024-03-16 ASSESSMENT — PAIN DESCRIPTION - FREQUENCY: FREQUENCY: CONTINUOUS

## 2024-03-16 NOTE — PROGRESS NOTES
Nephrology Progress Note        [address] [facility], [address] [place] [address]  Phone: [phone]  Office Hours: 8:30AM - 4:30PM  Monday - Friday        3/16/2024 10:27 AM  Subjective:   Admit Date: 3/12/2024  PCP: José Luis Izquierdo MD  Interval History:   In resp isolation for RSV  On room air    Diet: ADULT DIET; Regular; 4 carb choices (60 gm/meal); 1500 ml      Data:   Scheduled Meds:   vancomycin (VANCOCIN) intermittent dosing (placeholder)   Other RX Placeholder    calcitRIOL  0.5 mcg Oral BID    Vitamin D  5,000 Units Oral Daily    epoetin paola-epbx  10,000 Units IntraVENous Once per day on Mon Wed Fri    sodium chloride flush  5-40 mL IntraVENous BID    [Held by provider] heparin (porcine)  5,000 Units SubCUTAneous 3 times per day    insulin lispro  0-8 Units SubCUTAneous TID WC    insulin lispro  0-4 Units SubCUTAneous Nightly    amLODIPine  5 mg Oral Daily    ARIPiprazole  5 mg Oral Nightly    atorvastatin  40 mg Oral Nightly    calcium acetate  1 capsule Oral TID WC    carvedilol  25 mg Oral BID WC    hydrALAZINE  25 mg Oral 3 times per day    insulin glargine  10 Units SubCUTAneous Nightly    meropenem  500 mg IntraVENous Q24H     Continuous Infusions:   sodium chloride      sodium chloride 100 mL/hr at 03/15/24 2123    dextrose       PRN Meds:loperamide, sodium chloride, guaiFENesin, albuterol, sodium chloride flush, sodium chloride, ondansetron **OR** ondansetron, polyethylene glycol, acetaminophen **OR** acetaminophen, glucose, dextrose bolus **OR** dextrose bolus, glucagon (rDNA), dextrose, oxyCODONE-acetaminophen  I/O last 3 completed shifts:  In: 1163.3 [P.O.:360; Blood:303.3]  Out: 3205   No intake/output data recorded.    Intake/Output Summary (Last 24 hours) at 3/16/2024 1028  Last data filed at 3/15/2024 1823  Gross per 24 hour   Intake 1163.33 ml   Output 3205 ml   Net -2041.67 ml       CBC:   Recent Labs     03/14/24  0402 03/15/24  0843 03/15/24  2021 03/16/24  0336  03/16/24  0846   WBC 5.9 6.9  --  5.5  --    HGB 7.0* 6.5* 7.7* 7.0* 7.5*    154  --  128*  --        BMP:    Recent Labs     03/14/24  0402 03/15/24  0843 03/16/24  0336    136 140   K 3.9 4.9 5.1   CL 96* 95* 101   CO2 26 24 25   BUN 43* 68* 56*   CREATININE 6.3* 8.5* 7.8*   GLUCOSE 119* 151* 138*   CALCIUM 7.5* 7.8* 8.4     Hepatic:   No results for input(s): \"AST\", \"ALT\", \"ALB\", \"BILITOT\", \"ALKPHOS\" in the last 72 hours.          Objective:   Vitals: /84   Pulse 72   Temp 97.9 °F (36.6 °C) (Oral)   Resp 16   Ht 1.753 m (5' 9\")   Wt 115.7 kg (255 lb)   SpO2 96%   BMI 37.66 kg/m²   General appearance: in no acute distress  HEENT: normocephalic, atraumatic,   Neck: supple, trachea midline  Lungs: breathing comfortably on room air  Extremities: left foot is wrapped  Neurologic: drowsy    MEDICAL DECISION MAKING     -Left foot infection: wound vac and abx as he declined an amputation  -Acute anemia  -Hypocalcemia   -Vitamin D deficiency  -ESRD on HD MWF  -HTN  -CAD/CHF HX  -RSV infection     Suggest:  -Hb 7  Will maximize JERRI - non responsive due to ongoing osteo and infection  K 5.1  Next dialysis Monday  Has RSV resp infection                  Electronically signed by Stephen Parmar MD on 3/16/2024 at 10:28 AM    [facility]  MD SAMANTHA SMITH DO  [address] [facility],  [address] [place] [address]  PHONE: [phone]  FAX: [phone]

## 2024-03-16 NOTE — PROGRESS NOTES
PHARMACY VANCOMYCIN MONITORING SERVICE  Pharmacy consulted by Dr. Stuart for monitoring and adjustment.    Indication for treatment: Vancomycin indication: Bone/Joint infection   Goal trough: Trough Goal: 15-20 mcg/mL  AUC/PAMELA: 400-600    Risk Factors for MRSA Identified:   Hospitalization within the past 90 days, Received IV antibiotics within the past 90 days, Patient on hemodialysis, Purulent and/or complicated SSTI    Pertinent Laboratory Values:   Temp Readings from Last 3 Encounters:   03/16/24 98.2 °F (36.8 °C) (Oral)   03/12/24 98.7 °F (37.1 °C) (Temporal)   03/05/24 98.2 °F (36.8 °C) (Temporal)     Recent Labs     03/14/24  0402 03/15/24  0843 03/16/24  0336   WBC 5.9 6.9 5.5     Recent Labs     03/14/24  0402 03/15/24  0843 03/16/24  0336   BUN 43* 68* 56*   CREATININE 6.3* 8.5* 7.8*     Estimated Creatinine Clearance: 15 mL/min (A) (based on SCr of 7.8 mg/dL (H)).    Intake/Output Summary (Last 24 hours) at 3/16/2024 1319  Last data filed at 3/15/2024 1823  Gross per 24 hour   Intake 1043.33 ml   Output 3205 ml   Net -2161.67 ml     Last Encounter Weight:  Wt Readings from Last 3 Encounters:   03/12/24 115.7 kg (255 lb)   02/12/24 114.4 kg (252 lb 3.2 oz)   01/10/24 114.3 kg (252 lb)     Pertinent Cultures:   Date    Source    Results  03/12/24  Blood    NGTD x72h  03/14/24  Respiratory Panel  RSV    VANCOMYCIN TROUGH:  No results for input(s): \"VANCOTROUGH\" in the last 72 hours.  VANCOMYCIN RANDOM:    Recent Labs     03/15/24  0843   VANCORANDOM 25.8     Assessment:  HPI: 48 y.o. male with history of CAD, HTN, DM type II, HLD, and diabetic foot ulcer. Patient presented to ED with an open wound on his left foot. X-ray of left foot shows progressive extensive osteomyelitis.  3/13 - Patient declines amputation. Local wound and antibiotics to continue. ID consulted.  3/14 - ID notes to continue vancomycin.  SCr, BUN, and urine output:  ESRD on HD on M/W/F  No urine output recorded  Day(s) of therapy: 5  (ID anticipating 6 week course)  Vancomycin concentration:   3/15 -  25.8 mg/L, pre-hd level, no dose needed today   3/18 - random @06:00, pre-HD    Plan:  Continue intermittent vancomycin dosing based on pre-dialysis levels  No vancomycin dose indicated today  Recheck level on Monday pre-HD.  Pharmacy will continue to monitor patient and adjust therapy as indicated    VANCOMYCIN CONCENTRATION ORDERED FOR 3/18/24 @06:00    Thank you for the consult.  Dora Gómez, [facility], PharmD, BCPS  3/16/2024 1:19 PM

## 2024-03-16 NOTE — PROGRESS NOTES
V2.0  [facility] Hospitalist Progress Note      Name:  Yovanny Levine /Age/Sex: 1975  (48 y.o. male)   MRN & CSN:  [id number] & [id number] Encounter Date/Time: 3/16/2024 11:59 AM EDT    Location:  [facility] PCP: José Luis Izquierdo MD       Hospital Day: 5    Assessment and Plan:   Yovanny Levine is a 48 y.o. male with pmh of  coronary artery disease, hypertension, diabetes mellitus type 2, on long-term insulin, diabetic foot ulcer, hyperlipidemia, depression, chronic pain   who presents with Diabetic foot infection (HCC)      Plan:  # Diabetic foot infection  -Referred from wound clinic  -General surgery consulted from ED  -X-ray of the left foot-progressive extensive osteolysis with pathological fracture and deformity at the fifth metatarsal head and neck and fifth digit proximal phalanx compatible with osteomyelitis.  Adjacent large soft tissue ulceration, soft tissue edema and air.  -Patient refuses any amputation, agreeable to wound VAC plan to continue local wound care and antibiotics  -Continue broad-spectrum antibiotics- vancomycin, meropenem will discuss with ID if antibiotics can be narrowed down, will require wound cultures prior to final antibiotic selection, called wound care and then the phone went to voicemail regarding collecting wound cultures did call the patient's RN she will discuss with charge nurse and try to get in touch with wound care RN  -ID on board         #.  Recent admission 2024 for sepsis secondary to left foot cellulitis  -Left foot MRI confirmed osteomyelitis-patient underwent incision and drainage, debridement of the left foot     #.  ESRD on HD  -Consult nephrology-Dr Parmar/Dr James  -Patient is on calcium acetate     #.  PUD  -EGD-2023-1 nonbleeding gastric ulcer noted in the gastric fundus.      #.  Coronary artery disease  -History of PCI and stenting to LAD, left circumflex  -[facility]-2022-patent stents  -Patient not taking aspirin   -On atorvastatin,

## 2024-03-17 LAB
ANION GAP SERPL CALCULATED.3IONS-SCNC: 19 MMOL/L (ref 7–16)
BASOPHILS ABSOLUTE: 0 K/CU MM
BASOPHILS RELATIVE PERCENT: 0.5 % (ref 0–1)
BUN SERPL-MCNC: 74 MG/DL (ref 6–23)
CALCIUM SERPL-MCNC: 8.3 MG/DL (ref 8.3–10.6)
CHLORIDE BLD-SCNC: 96 MMOL/L (ref 99–110)
CO2: 22 MMOL/L (ref 21–32)
CREAT SERPL-MCNC: 9.9 MG/DL (ref 0.9–1.3)
CRP SERPL HS-MCNC: 38.5 MG/L
CULTURE: NORMAL
CULTURE: NORMAL
DIFFERENTIAL TYPE: ABNORMAL
EOSINOPHILS ABSOLUTE: 0.1 K/CU MM
EOSINOPHILS RELATIVE PERCENT: 2.4 % (ref 0–3)
GFR SERPL CREATININE-BSD FRML MDRD: 6 ML/MIN/1.73M2
GLUCOSE BLD-MCNC: 130 MG/DL (ref 70–99)
GLUCOSE BLD-MCNC: 140 MG/DL (ref 70–99)
GLUCOSE BLD-MCNC: 164 MG/DL (ref 70–99)
GLUCOSE BLD-MCNC: 176 MG/DL (ref 70–99)
GLUCOSE SERPL-MCNC: 148 MG/DL (ref 70–99)
HCT VFR BLD CALC: 22.3 % (ref 42–52)
HEMOGLOBIN: 7.1 GM/DL (ref 13.5–18)
IMMATURE NEUTROPHIL %: 0.5 % (ref 0–0.43)
LYMPHOCYTES ABSOLUTE: 0.8 K/CU MM
LYMPHOCYTES RELATIVE PERCENT: 12.9 % (ref 24–44)
Lab: NORMAL
Lab: NORMAL
MCH RBC QN AUTO: 30.2 PG (ref 27–31)
MCHC RBC AUTO-ENTMCNC: 31.8 % (ref 32–36)
MCV RBC AUTO: 94.9 FL (ref 78–100)
MONOCYTES ABSOLUTE: 0.6 K/CU MM
MONOCYTES RELATIVE PERCENT: 9.6 % (ref 0–4)
NUCLEATED RBC %: 0 %
PDW BLD-RTO: 16.4 % (ref 11.7–14.9)
PLATELET # BLD: 136 K/CU MM (ref 140–440)
PMV BLD AUTO: 10.2 FL (ref 7.5–11.1)
POTASSIUM SERPL-SCNC: 5.3 MMOL/L (ref 3.5–5.1)
RBC # BLD: 2.35 M/CU MM (ref 4.6–6.2)
SEGMENTED NEUTROPHILS ABSOLUTE COUNT: 4.4 K/CU MM
SEGMENTED NEUTROPHILS RELATIVE PERCENT: 74.1 % (ref 36–66)
SODIUM BLD-SCNC: 137 MMOL/L (ref 135–145)
SPECIMEN: NORMAL
SPECIMEN: NORMAL
TOTAL IMMATURE NEUTOROPHIL: 0.03 K/CU MM
TOTAL NUCLEATED RBC: 0 K/CU MM
WBC # BLD: 6 K/CU MM (ref 4–10.5)

## 2024-03-17 PROCEDURE — 6370000000 HC RX 637 (ALT 250 FOR IP): Performed by: INTERNAL MEDICINE

## 2024-03-17 PROCEDURE — 1200000000 HC SEMI PRIVATE

## 2024-03-17 PROCEDURE — 76937 US GUIDE VASCULAR ACCESS: CPT

## 2024-03-17 PROCEDURE — 6370000000 HC RX 637 (ALT 250 FOR IP): Performed by: NURSE PRACTITIONER

## 2024-03-17 PROCEDURE — 2580000003 HC RX 258: Performed by: INTERNAL MEDICINE

## 2024-03-17 PROCEDURE — 2500000003 HC RX 250 WO HCPCS: Performed by: INTERNAL MEDICINE

## 2024-03-17 PROCEDURE — 2500000003 HC RX 250 WO HCPCS: Performed by: NURSE PRACTITIONER

## 2024-03-17 PROCEDURE — 87075 CULTR BACTERIA EXCEPT BLOOD: CPT

## 2024-03-17 PROCEDURE — 2580000003 HC RX 258: Performed by: STUDENT IN AN ORGANIZED HEALTH CARE EDUCATION/TRAINING PROGRAM

## 2024-03-17 PROCEDURE — 94761 N-INVAS EAR/PLS OXIMETRY MLT: CPT

## 2024-03-17 PROCEDURE — 36415 COLL VENOUS BLD VENIPUNCTURE: CPT

## 2024-03-17 PROCEDURE — 6360000002 HC RX W HCPCS: Performed by: INTERNAL MEDICINE

## 2024-03-17 PROCEDURE — 87070 CULTURE OTHR SPECIMN AEROBIC: CPT

## 2024-03-17 PROCEDURE — 86140 C-REACTIVE PROTEIN: CPT

## 2024-03-17 PROCEDURE — 85025 COMPLETE CBC W/AUTO DIFF WBC: CPT

## 2024-03-17 PROCEDURE — 80048 BASIC METABOLIC PNL TOTAL CA: CPT

## 2024-03-17 PROCEDURE — 82962 GLUCOSE BLOOD TEST: CPT

## 2024-03-17 RX ORDER — HYDROXYZINE HYDROCHLORIDE 10 MG/1
10 TABLET, FILM COATED ORAL 3 TIMES DAILY PRN
Status: DISCONTINUED | OUTPATIENT
Start: 2024-03-17 | End: 2024-03-20 | Stop reason: HOSPADM

## 2024-03-17 RX ADMIN — OXYCODONE AND ACETAMINOPHEN 1 TABLET: 7.5; 325 TABLET ORAL at 18:37

## 2024-03-17 RX ADMIN — ATORVASTATIN CALCIUM 40 MG: 40 TABLET, FILM COATED ORAL at 22:51

## 2024-03-17 RX ADMIN — GUAIFENESIN 200 MG: 100 SOLUTION ORAL at 01:57

## 2024-03-17 RX ADMIN — SODIUM CHLORIDE, PRESERVATIVE FREE 10 ML: 5 INJECTION INTRAVENOUS at 22:57

## 2024-03-17 RX ADMIN — GUAIFENESIN 200 MG: 100 SOLUTION ORAL at 10:27

## 2024-03-17 RX ADMIN — OXYCODONE AND ACETAMINOPHEN 1 TABLET: 7.5; 325 TABLET ORAL at 05:49

## 2024-03-17 RX ADMIN — INSULIN GLARGINE 10 UNITS: 100 INJECTION, SOLUTION SUBCUTANEOUS at 22:53

## 2024-03-17 RX ADMIN — SODIUM CHLORIDE: 9 INJECTION, SOLUTION INTRAVENOUS at 22:58

## 2024-03-17 RX ADMIN — CALCITRIOL CAPSULES 0.25 MCG 0.5 MCG: 0.25 CAPSULE ORAL at 22:51

## 2024-03-17 RX ADMIN — MEROPENEM 500 MG: 500 INJECTION, POWDER, FOR SOLUTION INTRAVENOUS at 22:59

## 2024-03-17 RX ADMIN — SODIUM CHLORIDE, PRESERVATIVE FREE 10 ML: 5 INJECTION INTRAVENOUS at 10:35

## 2024-03-17 RX ADMIN — HYDRALAZINE HYDROCHLORIDE 25 MG: 25 TABLET ORAL at 10:27

## 2024-03-17 RX ADMIN — HYDRALAZINE HYDROCHLORIDE 25 MG: 25 TABLET ORAL at 00:23

## 2024-03-17 RX ADMIN — HYDROXYZINE HYDROCHLORIDE 10 MG: 10 TABLET, FILM COATED ORAL at 10:25

## 2024-03-17 RX ADMIN — CARVEDILOL 25 MG: 25 TABLET, FILM COATED ORAL at 10:27

## 2024-03-17 RX ADMIN — CALCIUM ACETATE 667 MG: 667 CAPSULE ORAL at 18:34

## 2024-03-17 RX ADMIN — Medication 5000 UNITS: at 10:24

## 2024-03-17 RX ADMIN — HYDRALAZINE HYDROCHLORIDE 25 MG: 25 TABLET ORAL at 22:51

## 2024-03-17 RX ADMIN — ARIPIPRAZOLE 5 MG: 10 TABLET ORAL at 22:51

## 2024-03-17 RX ADMIN — ACETAMINOPHEN 650 MG: 325 TABLET ORAL at 10:24

## 2024-03-17 RX ADMIN — CALCITRIOL CAPSULES 0.25 MCG 0.5 MCG: 0.25 CAPSULE ORAL at 10:25

## 2024-03-17 RX ADMIN — OXYCODONE AND ACETAMINOPHEN 1 TABLET: 7.5; 325 TABLET ORAL at 12:02

## 2024-03-17 RX ADMIN — AMLODIPINE BESYLATE 5 MG: 5 TABLET ORAL at 10:24

## 2024-03-17 RX ADMIN — CALCIUM ACETATE 667 MG: 667 CAPSULE ORAL at 12:02

## 2024-03-17 ASSESSMENT — PAIN DESCRIPTION - LOCATION
LOCATION: FOOT

## 2024-03-17 ASSESSMENT — PAIN DESCRIPTION - ORIENTATION
ORIENTATION: LEFT

## 2024-03-17 ASSESSMENT — PAIN DESCRIPTION - DESCRIPTORS
DESCRIPTORS: DULL;DISCOMFORT
DESCRIPTORS: BURNING
DESCRIPTORS: DULL;DISCOMFORT
DESCRIPTORS: DISCOMFORT;DULL
DESCRIPTORS: BURNING

## 2024-03-17 ASSESSMENT — PAIN SCALES - GENERAL
PAINLEVEL_OUTOF10: 8
PAINLEVEL_OUTOF10: 7
PAINLEVEL_OUTOF10: 4
PAINLEVEL_OUTOF10: 5
PAINLEVEL_OUTOF10: 8
PAINLEVEL_OUTOF10: 8
PAINLEVEL_OUTOF10: 7

## 2024-03-17 ASSESSMENT — PAIN DESCRIPTION - ONSET: ONSET: ON-GOING

## 2024-03-17 ASSESSMENT — PAIN - FUNCTIONAL ASSESSMENT: PAIN_FUNCTIONAL_ASSESSMENT: ACTIVITIES ARE NOT PREVENTED

## 2024-03-17 ASSESSMENT — PAIN DESCRIPTION - FREQUENCY: FREQUENCY: CONTINUOUS

## 2024-03-17 NOTE — PROGRESS NOTES
PHARMACY VANCOMYCIN MONITORING SERVICE  Pharmacy consulted by Dr. Stuart for monitoring and adjustment.    Indication for treatment: Vancomycin indication: Bone/Joint infection   Goal trough: Trough Goal: 15-20 mcg/mL  AUC/PAMELA: 400-600    Risk Factors for MRSA Identified:   Hospitalization within the past 90 days, Received IV antibiotics within the past 90 days, Patient on hemodialysis, Purulent and/or complicated SSTI    Pertinent Laboratory Values:   Temp Readings from Last 3 Encounters:   03/17/24 98.7 °F (37.1 °C) (Oral)   03/12/24 98.7 °F (37.1 °C) (Temporal)   03/05/24 98.2 °F (36.8 °C) (Temporal)     Recent Labs     03/15/24  0843 03/16/24  0336 03/17/24  0446   WBC 6.9 5.5 6.0     Recent Labs     03/15/24  0843 03/16/24  0336 03/17/24  0446   BUN 68* 56* 74*   CREATININE 8.5* 7.8* 9.9*     Estimated Creatinine Clearance: 11 mL/min (A) (based on SCr of 9.9 mg/dL (H)).    Intake/Output Summary (Last 24 hours) at 3/17/2024 1153  Last data filed at 3/16/2024 2125  Gross per 24 hour   Intake --   Output 100 ml   Net -100 ml     Last Encounter Weight:  Wt Readings from Last 3 Encounters:   03/12/24 115.7 kg (255 lb)   02/12/24 114.4 kg (252 lb 3.2 oz)   01/10/24 114.3 kg (252 lb)     Pertinent Cultures:   Date    Source    Results  03/12/24  Blood    NGTD x72h  03/14/24  Respiratory Panel  RSV    VANCOMYCIN TROUGH:  No results for input(s): \"VANCOTROUGH\" in the last 72 hours.  VANCOMYCIN RANDOM:    Recent Labs     03/15/24  0843   VANCORANDOM 25.8     Assessment:  HPI: 48 y.o. male with history of CAD, HTN, DM type II, HLD, and diabetic foot ulcer. Patient presented to ED with an open wound on his left foot. X-ray of left foot shows progressive extensive osteomyelitis.  3/13 - Patient declines amputation. Local wound and antibiotics to continue. ID consulted.  3/14 - ID notes to continue vancomycin.  SCr, BUN, and urine output:  ESRD on HD on M/W/F  No urine output recorded  Day(s) of therapy: 6 (ID

## 2024-03-17 NOTE — PROGRESS NOTES
Nephrology Progress Note        [address] [facility], [address]  [place] [address]  Phone: [phone]  Office Hours: 8:30AM - 4:30PM  Monday - Friday        3/17/2024 8:38 AM  Subjective:   Admit Date: 3/12/2024  PCP: José Luis Izquierdo MD  Interval History:   In resp isolation for RSV  On room air  Awake alert  Mom dying- in hospice on the 4th floor    Diet: ADULT DIET; Regular; 4 carb choices (60 gm/meal); 1500 ml      Data:   Scheduled Meds:   vancomycin (VANCOCIN) intermittent dosing (placeholder)   Other RX Placeholder    calcitRIOL  0.5 mcg Oral BID    Vitamin D  5,000 Units Oral Daily    epoetin paola-epbx  10,000 Units IntraVENous Once per day on Mon Wed Fri    sodium chloride flush  5-40 mL IntraVENous BID    [Held by provider] heparin (porcine)  5,000 Units SubCUTAneous 3 times per day    insulin lispro  0-8 Units SubCUTAneous TID WC    insulin lispro  0-4 Units SubCUTAneous Nightly    amLODIPine  5 mg Oral Daily    ARIPiprazole  5 mg Oral Nightly    atorvastatin  40 mg Oral Nightly    calcium acetate  1 capsule Oral TID WC    carvedilol  25 mg Oral BID WC    hydrALAZINE  25 mg Oral 3 times per day    insulin glargine  10 Units SubCUTAneous Nightly    meropenem  500 mg IntraVENous Q24H     Continuous Infusions:   sodium chloride      sodium chloride 100 mL/hr at 03/16/24 2120    dextrose       PRN Meds:hydrOXYzine HCl, loperamide, sodium chloride, guaiFENesin, albuterol, sodium chloride flush, sodium chloride, ondansetron **OR** ondansetron, polyethylene glycol, acetaminophen **OR** acetaminophen, glucose, dextrose bolus **OR** dextrose bolus, glucagon (rDNA), dextrose, oxyCODONE-acetaminophen  I/O last 3 completed shifts:  In: -   Out: 100 [Urine:100]  No intake/output data recorded.    Intake/Output Summary (Last 24 hours) at 3/17/2024 0838  Last data filed at 3/16/2024 2125  Gross per 24 hour   Intake --   Output 100 ml   Net -100 ml       CBC:   Recent Labs     03/15/24  0843 03/15/24  2021

## 2024-03-17 NOTE — CONSULTS
Consult completed.  Nexiva 20g 1.00 inch catheter inserted via ultrasound in patient's RFA.  Brisk blood return noted and catheter flushes with ease.  Patient tolerated well.  Consult IV/PICC team for any questions or if patient's needs change.

## 2024-03-17 NOTE — PROGRESS NOTES
V2.0  [facility] Hospitalist Progress Note      Name:  Yovanny Levine /Age/Sex: 1975  (48 y.o. male)   MRN & CSN:  [id number] & [id number] Encounter Date/Time: 3/17/2024 11:59 AM EDT    Location:  [facility]-A PCP: José Luis Izquierdo MD       Hospital Day: 6    Assessment and Plan:   Yovanny Levine is a 48 y.o. male with pmh of  coronary artery disease, hypertension, diabetes mellitus type 2, on long-term insulin, diabetic foot ulcer, hyperlipidemia, depression, chronic pain   who presents with Diabetic foot infection (HCC)      Plan:  # Diabetic foot infection  -Referred from wound clinic  -General surgery consulted from ED  -X-ray of the left foot-progressive extensive osteolysis with pathological fracture and deformity at the fifth metatarsal head and neck and fifth digit proximal phalanx compatible with osteomyelitis.  Adjacent large soft tissue ulceration, soft tissue edema and air.  -Patient refuses any amputation, agreeable to wound VAC plan to continue local wound care and antibiotics  -Continue broad-spectrum antibiotics- vancomycin, meropenem will discuss with ID if antibiotics can be narrowed down, will require wound cultures prior to final antibiotic selection, FINALLY wound culture obtained after 48 hours of the orders  -ID on board         #.  Recent admission 2024 for sepsis secondary to left foot cellulitis  -Left foot MRI confirmed osteomyelitis-patient underwent incision and drainage, debridement of the left foot     #.  ESRD on HD  -Consult nephrology-Dr Parmar/Dr James  -Patient is on calcium acetate     #.  PUD  -EGD-2023-1 nonbleeding gastric ulcer noted in the gastric fundus.      #.  Coronary artery disease  -History of PCI and stenting to LAD, left circumflex  -[facility]-2022-patent stents  -Patient not taking aspirin   -On atorvastatin, carvedilol-continue     #.  Chronic normocytic anemia  -Likely secondary to end-stage renal disease, hemoglobin 6.5 this morning will order 1 more unit

## 2024-03-18 LAB
ANION GAP SERPL CALCULATED.3IONS-SCNC: 21 MMOL/L (ref 7–16)
BASOPHILS ABSOLUTE: 0 K/CU MM
BASOPHILS RELATIVE PERCENT: 0.6 % (ref 0–1)
BUN SERPL-MCNC: 90 MG/DL (ref 6–23)
CALCIUM SERPL-MCNC: 8.2 MG/DL (ref 8.3–10.6)
CHLORIDE BLD-SCNC: 99 MMOL/L (ref 99–110)
CO2: 19 MMOL/L (ref 21–32)
CREAT SERPL-MCNC: 10.9 MG/DL (ref 0.9–1.3)
CRP SERPL HS-MCNC: 28.3 MG/L
DIFFERENTIAL TYPE: ABNORMAL
DOSE AMOUNT: NORMAL
DOSE TIME: NORMAL
EOSINOPHILS ABSOLUTE: 0.2 K/CU MM
EOSINOPHILS RELATIVE PERCENT: 3 % (ref 0–3)
GFR SERPL CREATININE-BSD FRML MDRD: 5 ML/MIN/1.73M2
GLUCOSE BLD-MCNC: 134 MG/DL (ref 70–99)
GLUCOSE BLD-MCNC: 146 MG/DL (ref 70–99)
GLUCOSE BLD-MCNC: 171 MG/DL (ref 70–99)
GLUCOSE BLD-MCNC: 194 MG/DL (ref 70–99)
GLUCOSE SERPL-MCNC: 116 MG/DL (ref 70–99)
HCT VFR BLD CALC: 21.9 % (ref 42–52)
HCT VFR BLD CALC: 22.5 % (ref 42–52)
HEMOGLOBIN: 6.8 GM/DL (ref 13.5–18)
HEMOGLOBIN: 7.2 GM/DL (ref 13.5–18)
IMMATURE NEUTROPHIL %: 0.6 % (ref 0–0.43)
LYMPHOCYTES ABSOLUTE: 0.7 K/CU MM
LYMPHOCYTES RELATIVE PERCENT: 12.3 % (ref 24–44)
MCH RBC QN AUTO: 29.6 PG (ref 27–31)
MCHC RBC AUTO-ENTMCNC: 31.1 % (ref 32–36)
MCV RBC AUTO: 95.2 FL (ref 78–100)
MONOCYTES ABSOLUTE: 0.6 K/CU MM
MONOCYTES RELATIVE PERCENT: 10.8 % (ref 0–4)
NUCLEATED RBC %: 0 %
PDW BLD-RTO: 16.1 % (ref 11.7–14.9)
PLATELET # BLD: 134 K/CU MM (ref 140–440)
PMV BLD AUTO: 9.6 FL (ref 7.5–11.1)
POTASSIUM SERPL-SCNC: 5.7 MMOL/L (ref 3.5–5.1)
RBC # BLD: 2.3 M/CU MM (ref 4.6–6.2)
SEGMENTED NEUTROPHILS ABSOLUTE COUNT: 3.8 K/CU MM
SEGMENTED NEUTROPHILS RELATIVE PERCENT: 72.7 % (ref 36–66)
SODIUM BLD-SCNC: 139 MMOL/L (ref 135–145)
TOTAL IMMATURE NEUTOROPHIL: 0.03 K/CU MM
TOTAL NUCLEATED RBC: 0 K/CU MM
VANCOMYCIN RANDOM: 16.6 UG/ML
WBC # BLD: 5.3 K/CU MM (ref 4–10.5)

## 2024-03-18 PROCEDURE — 6360000002 HC RX W HCPCS: Performed by: INTERNAL MEDICINE

## 2024-03-18 PROCEDURE — 36415 COLL VENOUS BLD VENIPUNCTURE: CPT

## 2024-03-18 PROCEDURE — 80202 ASSAY OF VANCOMYCIN: CPT

## 2024-03-18 PROCEDURE — 80048 BASIC METABOLIC PNL TOTAL CA: CPT

## 2024-03-18 PROCEDURE — 6360000002 HC RX W HCPCS: Performed by: NURSE PRACTITIONER

## 2024-03-18 PROCEDURE — 94640 AIRWAY INHALATION TREATMENT: CPT

## 2024-03-18 PROCEDURE — 97605 NEG PRS WND THER DME<=50SQCM: CPT

## 2024-03-18 PROCEDURE — 85014 HEMATOCRIT: CPT

## 2024-03-18 PROCEDURE — 86140 C-REACTIVE PROTEIN: CPT

## 2024-03-18 PROCEDURE — 87186 SC STD MICRODIL/AGAR DIL: CPT

## 2024-03-18 PROCEDURE — 86850 RBC ANTIBODY SCREEN: CPT

## 2024-03-18 PROCEDURE — 86900 BLOOD TYPING SEROLOGIC ABO: CPT

## 2024-03-18 PROCEDURE — 6370000000 HC RX 637 (ALT 250 FOR IP): Performed by: INTERNAL MEDICINE

## 2024-03-18 PROCEDURE — 87075 CULTR BACTERIA EXCEPT BLOOD: CPT

## 2024-03-18 PROCEDURE — 1200000000 HC SEMI PRIVATE

## 2024-03-18 PROCEDURE — 86901 BLOOD TYPING SEROLOGIC RH(D): CPT

## 2024-03-18 PROCEDURE — 94761 N-INVAS EAR/PLS OXIMETRY MLT: CPT

## 2024-03-18 PROCEDURE — 87077 CULTURE AEROBIC IDENTIFY: CPT

## 2024-03-18 PROCEDURE — 86922 COMPATIBILITY TEST ANTIGLOB: CPT

## 2024-03-18 PROCEDURE — P9016 RBC LEUKOCYTES REDUCED: HCPCS

## 2024-03-18 PROCEDURE — 85018 HEMOGLOBIN: CPT

## 2024-03-18 PROCEDURE — 2500000003 HC RX 250 WO HCPCS: Performed by: INTERNAL MEDICINE

## 2024-03-18 PROCEDURE — 90935 HEMODIALYSIS ONE EVALUATION: CPT

## 2024-03-18 PROCEDURE — 82962 GLUCOSE BLOOD TEST: CPT

## 2024-03-18 PROCEDURE — 36430 TRANSFUSION BLD/BLD COMPNT: CPT

## 2024-03-18 PROCEDURE — 87070 CULTURE OTHR SPECIMN AEROBIC: CPT

## 2024-03-18 PROCEDURE — 99232 SBSQ HOSP IP/OBS MODERATE 35: CPT | Performed by: INTERNAL MEDICINE

## 2024-03-18 PROCEDURE — 85025 COMPLETE CBC W/AUTO DIFF WBC: CPT

## 2024-03-18 PROCEDURE — 2580000003 HC RX 258: Performed by: INTERNAL MEDICINE

## 2024-03-18 RX ORDER — SODIUM CHLORIDE 9 MG/ML
INJECTION, SOLUTION INTRAVENOUS PRN
Status: DISCONTINUED | OUTPATIENT
Start: 2024-03-18 | End: 2024-03-18

## 2024-03-18 RX ORDER — SODIUM CHLOR/HYPOCHLOROUS ACID 0.033 %
SOLUTION, IRRIGATION IRRIGATION PRN
Status: DISCONTINUED | OUTPATIENT
Start: 2024-03-18 | End: 2024-03-20 | Stop reason: HOSPADM

## 2024-03-18 RX ADMIN — CALCITRIOL CAPSULES 0.25 MCG 0.5 MCG: 0.25 CAPSULE ORAL at 09:09

## 2024-03-18 RX ADMIN — HYDRALAZINE HYDROCHLORIDE 25 MG: 25 TABLET ORAL at 17:18

## 2024-03-18 RX ADMIN — OXYCODONE AND ACETAMINOPHEN 1 TABLET: 7.5; 325 TABLET ORAL at 23:48

## 2024-03-18 RX ADMIN — OXYCODONE AND ACETAMINOPHEN 1 TABLET: 7.5; 325 TABLET ORAL at 02:15

## 2024-03-18 RX ADMIN — OXYCODONE AND ACETAMINOPHEN 1 TABLET: 7.5; 325 TABLET ORAL at 17:19

## 2024-03-18 RX ADMIN — HYDRALAZINE HYDROCHLORIDE 25 MG: 25 TABLET ORAL at 09:09

## 2024-03-18 RX ADMIN — HYDRALAZINE HYDROCHLORIDE 25 MG: 25 TABLET ORAL at 23:49

## 2024-03-18 RX ADMIN — EPOETIN ALFA-EPBX 14000 UNITS: 10000 INJECTION, SOLUTION INTRAVENOUS; SUBCUTANEOUS at 16:31

## 2024-03-18 RX ADMIN — CARVEDILOL 25 MG: 25 TABLET, FILM COATED ORAL at 09:10

## 2024-03-18 RX ADMIN — SODIUM CHLORIDE, PRESERVATIVE FREE 10 ML: 5 INJECTION INTRAVENOUS at 09:10

## 2024-03-18 RX ADMIN — CALCIUM ACETATE 667 MG: 667 CAPSULE ORAL at 17:19

## 2024-03-18 RX ADMIN — ALBUTEROL SULFATE 2.5 MG: 2.5 SOLUTION RESPIRATORY (INHALATION) at 11:51

## 2024-03-18 RX ADMIN — Medication 5000 UNITS: at 09:09

## 2024-03-18 RX ADMIN — AMLODIPINE BESYLATE 5 MG: 5 TABLET ORAL at 09:09

## 2024-03-18 RX ADMIN — OXYCODONE AND ACETAMINOPHEN 1 TABLET: 7.5; 325 TABLET ORAL at 09:17

## 2024-03-18 RX ADMIN — VANCOMYCIN HYDROCHLORIDE 1500 MG: 1.5 INJECTION, POWDER, LYOPHILIZED, FOR SOLUTION INTRAVENOUS at 17:34

## 2024-03-18 RX ADMIN — SODIUM CHLORIDE: 9 INJECTION, SOLUTION INTRAVENOUS at 21:59

## 2024-03-18 RX ADMIN — ATORVASTATIN CALCIUM 40 MG: 40 TABLET, FILM COATED ORAL at 21:50

## 2024-03-18 RX ADMIN — CALCIUM ACETATE 667 MG: 667 CAPSULE ORAL at 09:09

## 2024-03-18 RX ADMIN — CARVEDILOL 25 MG: 25 TABLET, FILM COATED ORAL at 17:19

## 2024-03-18 RX ADMIN — ARIPIPRAZOLE 5 MG: 10 TABLET ORAL at 21:50

## 2024-03-18 RX ADMIN — CALCITRIOL CAPSULES 0.25 MCG 0.5 MCG: 0.25 CAPSULE ORAL at 21:50

## 2024-03-18 RX ADMIN — CALCIUM ACETATE 667 MG: 667 CAPSULE ORAL at 12:22

## 2024-03-18 RX ADMIN — MEROPENEM 500 MG: 500 INJECTION, POWDER, FOR SOLUTION INTRAVENOUS at 22:01

## 2024-03-18 RX ADMIN — INSULIN GLARGINE 10 UNITS: 100 INJECTION, SOLUTION SUBCUTANEOUS at 21:51

## 2024-03-18 RX ADMIN — SODIUM CHLORIDE, PRESERVATIVE FREE 10 ML: 5 INJECTION INTRAVENOUS at 21:49

## 2024-03-18 ASSESSMENT — PAIN DESCRIPTION - PAIN TYPE
TYPE: ACUTE PAIN

## 2024-03-18 ASSESSMENT — PAIN DESCRIPTION - ORIENTATION
ORIENTATION: LEFT

## 2024-03-18 ASSESSMENT — PAIN DESCRIPTION - LOCATION
LOCATION: FOOT

## 2024-03-18 ASSESSMENT — PAIN DESCRIPTION - FREQUENCY
FREQUENCY: CONTINUOUS
FREQUENCY: INTERMITTENT
FREQUENCY: CONTINUOUS

## 2024-03-18 ASSESSMENT — PAIN SCALES - WONG BAKER
WONGBAKER_NUMERICALRESPONSE: HURTS EVEN MORE

## 2024-03-18 ASSESSMENT — PAIN - FUNCTIONAL ASSESSMENT
PAIN_FUNCTIONAL_ASSESSMENT: ACTIVITIES ARE NOT PREVENTED

## 2024-03-18 ASSESSMENT — PAIN DESCRIPTION - DESCRIPTORS
DESCRIPTORS: STABBING;SHARP
DESCRIPTORS: ACHING;THROBBING
DESCRIPTORS: SHOOTING

## 2024-03-18 ASSESSMENT — PAIN SCALES - GENERAL
PAINLEVEL_OUTOF10: 8
PAINLEVEL_OUTOF10: 8
PAINLEVEL_OUTOF10: 9
PAINLEVEL_OUTOF10: 8
PAINLEVEL_OUTOF10: 7
PAINLEVEL_OUTOF10: 8

## 2024-03-18 ASSESSMENT — PAIN DESCRIPTION - ONSET
ONSET: ON-GOING

## 2024-03-18 NOTE — PROGRESS NOTES
Pt completed 3 hours dialysis today removing 3 L fluid.  Tolerated well, no distress noted.  Left AVF used for access ran good, pressure dressing to both needle sites.  Pt denies needs.  Report to ROBIN Jean.      Patient Name: Yovanny Levine  Patient : 1975  MRN: [id number]     Acct: [id number]  Date of Admission: 3/12/2024  Room/Bed: 1118/[facility]8-A  Code Status:  Full Code  Allergies:   Allergies   Allergen Reactions    Adhesive Tape Rash    Doxycycline Nausea And Vomiting    Reglan [Metoclopramide] Anxiety     Diagnosis:    Patient Active Problem List   Diagnosis    Hiatal hernia    Diabetes mellitus type 2, improved controlled    Diabetic neuropathy (HCC)    Panic attack    Anxiety associated with depression    Neck pain    DANIELA (obstructive sleep apnea)    Urinary frequency    Bilateral carpal tunnel syndrome- left worse than right    Hyperlipidemia with target LDL less than 100    Essential hypertension    Bronchitis    Osteomyelitis (HCC)    Abscess    Periumbilical hernia    Sepsis (HCC)    Cellulitis of left foot    Constipation    Intractable nausea and vomiting    Uncontrolled type 2 diabetes mellitus    Nausea and vomiting    Diabetic foot infection (HCC)    Acute renal failure (ARF) (HCC)    Cellulitis    Cellulitis of left arm    Cellulitis, scrotum    Acute epididymitis    Irene gangrene    Recurrent major depressive disorder, in full remission (HCC)    Generalized anxiety disorder    Morbid obesity with body mass index (BMI) of 40.0 to 44.9 in adult (HCC)    Necrotizing fasciitis (HCC)    Syncope    Right groin wound    Ulcer of right groin with fat layer exposed (HCC)    Diabetic ulcer of left midfoot associated with type 2 diabetes mellitus, with necrosis of muscle (HCC)    Nephrotic syndrome    Generalized edema    Stage 3b chronic kidney disease (HCC)    Diabetic nephropathy associated with type 2 diabetes mellitus (HCC)    Hypoalbuminemia    Chronic kidney disease, stage IV (severe)  ml/hr 0 ml -110 mmHg 150 50 700 TX STARTED, PRIME GIVEN, LINES SECURE AND CALL LIGHT WITHIN REACH Yes   03/18/24 1330 350 ml/min 1170 ml/hr 60 ml -120 mmHg 150 70 700 RESTING WITH EYES CLOSED Yes   03/18/24 1345 350 ml/min 1170 ml/hr 628 ml -110 mmHg 160 70 700 RESTING WITH EYES CLOSED Yes   03/18/24 1400 350 ml/min 1170 ml/hr 912 ml -150 mmHg 160 70 700 Denies needs Yes   03/18/24 1415 350 ml/min 1170 ml/hr 1234 ml -150 mmHg 160 70 700 RESTING WITH EYES CLOSED Yes   03/18/24 1430 350 ml/min 1170 ml/hr 1541 ml -140 mmHg 160 70 700 No distress noted Yes   03/18/24 1445 350 ml/min 1170 ml/hr 1767 ml -130 mmHg 170 70 700 Eyes closed Yes   03/18/24 1500 350 ml/min 1170 ml/hr 2030 ml -130 mmHg 170 70 700 RESTING WITH EYES CLOSED Yes   03/18/24 1515 350 ml/min 1170 ml/hr 2350 ml -130 mmHg 140 70 700 NO CHANGE, RESTING Yes   03/18/24 1530 350 ml/min 1160 ml/hr 2605 ml -140 mmHg 190 70 700 on cell phone Yes   03/18/24 1545 350 ml/min 1160 ml/hr 2924 ml -140 mmHg 170 70 700 PRBC completed Yes   03/18/24 1600 350 ml/min 1160 ml/hr 3186 ml -140 mmHg 180 70 700 Denies needs Yes   03/18/24 1615 350 ml/min 1160 ml/hr 3480 ml -140 mmHg 180 70 700 AWAKE AND TALKING Yes   03/18/24 1621 200 ml/min 0 ml/hr 3500 ml -20 mmHg 230 50 700 Treatment completed Yes     Vital Signs  Patient Vitals for the past 8 hrs:   BP Temp Pulse Resp SpO2   03/18/24 0900 (!) 126/58 98.2 °F (36.8 °C) 66 16 92 %   03/18/24 1151 -- -- 64 18 --   03/18/24 1200 (!) 119/98 97.7 °F (36.5 °C) -- 18 93 %   03/18/24 1215 135/84 97.9 °F (36.6 °C) -- 18 94 %   03/18/24 1224 117/60 97.9 °F (36.6 °C) -- 18 93 %   03/18/24 1305 117/70 98.2 °F (36.8 °C) -- 22 95 %   03/18/24 1315 124/64 -- 66 -- --   03/18/24 1330 123/73 -- 66 -- --   03/18/24 1345 138/66 -- 66 -- --   03/18/24 1400 (!) 159/65 -- 66 -- --   03/18/24 1415 134/72 -- 66 -- --   03/18/24 1430 130/69 -- 67 -- --   03/18/24 1445 136/75 -- 65 -- --   03/18/24 1500 (!) 156/82 -- 66 -- --   03/18/24 1515 (!)

## 2024-03-18 NOTE — PROGRESS NOTES
PHARMACY VANCOMYCIN MONITORING SERVICE  Pharmacy consulted by Dr. Stuart for monitoring and adjustment.    Indication for treatment: Vancomycin indication: Bone/Joint infection   Goal trough: Trough Goal: 15-20 mcg/mL  AUC/PAMELA: 400-600    Risk Factors for MRSA Identified:   Hospitalization within the past 90 days, Received IV antibiotics within the past 90 days, Patient on hemodialysis, Purulent and/or complicated SSTI    Pertinent Laboratory Values:   Temp Readings from Last 3 Encounters:   03/18/24 98.2 °F (36.8 °C) (Oral)   03/12/24 98.7 °F (37.1 °C) (Temporal)   03/05/24 98.2 °F (36.8 °C) (Temporal)     Recent Labs     03/16/24  0336 03/17/24  0446 03/18/24  0617   WBC 5.5 6.0 5.3       Recent Labs     03/16/24  0336 03/17/24  0446 03/18/24  0617   BUN 56* 74* 90*   CREATININE 7.8* 9.9* 10.9*       Estimated Creatinine Clearance: 10 mL/min (A) (based on SCr of 10.9 mg/dL (H)).    Intake/Output Summary (Last 24 hours) at 3/18/2024 1205  Last data filed at 3/18/2024 0759  Gross per 24 hour   Intake 480 ml   Output 100 ml   Net 380 ml       Last Encounter Weight:  Wt Readings from Last 3 Encounters:   03/12/24 115.7 kg (255 lb)   02/12/24 114.4 kg (252 lb 3.2 oz)   01/10/24 114.3 kg (252 lb)     Pertinent Cultures:   Date    Source    Results  03/12/24  Blood    NGTD x72h  03/14/24  Respiratory Panel  RSV    VANCOMYCIN TROUGH:  No results for input(s): \"VANCOTROUGH\" in the last 72 hours.  VANCOMYCIN RANDOM:    Recent Labs     03/18/24  0617   VANCORANDOM 16.6       Assessment:  HPI: 48 y.o. male with history of CAD, HTN, DM type II, HLD, and diabetic foot ulcer. Patient presented to ED with an open wound on his left foot. X-ray of left foot shows progressive extensive osteomyelitis.  3/13 - Patient declines amputation. Local wound and antibiotics to continue. ID consulted.  3/14 - ID notes to continue vancomycin.  3/18 - ID notes antibiotics continue for a 6 week course.  SCr, BUN, and urine output:  ESRD on HD

## 2024-03-18 NOTE — CONSULTS
[facility] Continence Nurse  Consult Note       Yovanny Levine  AGE: 48 y.o.   GENDER: male  : 1975  TODAY'S DATE:  3/18/2024    Subjective:     Reason for  Evaluation and Assessment: NPWT veraflo dressing change to left foot.      Yovanny Levine is a 48 y.o. male referred by:   [x] Physician  [] Nursing  [] Other:     Wound Identification:  Wound Type: diabetic, pressure, and non-healing surgical  Contributing Factors: diabetes and chronic pressure        PAST MEDICAL HISTORY        Diagnosis Date    Abscess 2010    scrotal    Acute renal failure (ARF) ([facility]) 2019    Anxiety associated with depression     Anxiety associated with depression     Back pain 2012    CAD (coronary artery disease) 02/10/2023    Chest pain 2013    Diabetic nephropathy ([facility])     Diabetic neuropathy ([facility]) 2011    Diabetic ulcer of left midfoot associated with type 2 diabetes mellitus, with fat layer exposed ([facility]) 2017    Diverticulosis     C scope + Dr. Medina    DM (diabetes mellitus), type 2 ([facility])     DR. 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 Medina    Nausea and vomiting 2019    Necrotizing fasciitis ([facility])     Nose fracture 1988    Panic attacks     Pericarditis     Hospitalized with s/p heart cath normal    Peripheral autonomic neuropathy due to diabetes mellitus ([facility])     Axonal EMG- [facility], 2011    Sebaceous cyst 2011    URI (upper respiratory infection) 2012    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 type 2 diabetes mellitus, with muscle involvement without evidence of necrosis ([facility]) 2021    WD-Wound, surgical, infected, initial encounter 2017    Wrist fracture        PAST SURGICAL HISTORY    Past Surgical  (cm) 0.2 cm 03/12/24 1549   Post-Procedure Width (cm) 0.2 cm 03/12/24 1549   Post-Procedure Depth (cm) 0.3 cm 03/12/24 1549   Post-Procedure Surface Area (cm^2) 0.04 cm^2 03/12/24 1549   Post-Procedure Volume (cm^3) 0.012 cm^3 03/12/24 1549   Distance Tunneling (cm) 0 cm 03/18/24 0919   Tunneling Position ___ O'Clock 0 03/18/24 0919   Undermining Starts ___ O'Clock 0 03/18/24 0919   Undermining Ends___ O'Clock 0 03/18/24 0919   Undermining Maxium Distance (cm) 0 03/18/24 0919   Wound Assessment Eschar dry 03/18/24 0919   Drainage Amount None (dry) 03/18/24 0919   Drainage Description Serosanguinous;Yellow 03/18/24 0919   Odor None 03/18/24 0919   Shazia-wound Assessment Intact 03/18/24 0919   Margins Attached edges 03/18/24 0919   Wound Thickness Description not for Pressure Injury Full thickness 03/18/24 0919   Number of days: 26       Wound 03/14/24 Foot Left medial foot cluster (Active)   Wound Image   03/14/24 0920   Wound Etiology Diabetic 03/18/24 0919   Wound Cleansed Betadine/povidone iodine 03/18/24 0919   Dressing/Treatment Open to air 03/18/24 1100   Wound Length (cm) 5 cm 03/14/24 0920   Wound Width (cm) 4.5 cm 03/14/24 0920   Wound Depth (cm) 0 cm 03/14/24 0920   Wound Surface Area (cm^2) 22.5 cm^2 03/14/24 0920   Wound Volume (cm^3) 0 cm^3 03/14/24 0920   Distance Tunneling (cm) 0 cm 03/18/24 0919   Tunneling Position ___ O'Clock 0 03/18/24 0919   Undermining Starts ___ O'Clock 0 03/18/24 0919   Undermining Ends___ O'Clock 0 03/18/24 0919   Wound Assessment Eschar dry;Pink/red 03/18/24 0919   Drainage Amount None (dry) 03/18/24 0919   Odor None 03/18/24 0919   Shazia-wound Assessment Intact 03/18/24 0919   Margins Attached edges 03/18/24 0919   Number of days: 4       Response to treatment:  Well tolerated by patient.     Pain Assessment:  Severity:  non  Quality of pain:   Wound Pain Timing/Severity:   Premedicated: no    Plan:     Plan of Care: Wound 02/20/24 Wound #1 Left Lateral Distal

## 2024-03-18 NOTE — PROGRESS NOTES
Infectious Disease Progress Note  3/18/2024   Patient Name: Yovanny Levine : 1975     Assessment  Left foot diabetic ulcer and 5th metatarsal osteomyelitis  Long hx of ulcer and x-ray revealed a pathologic fracture of the metatarsal head, neck and fifth digit proximal phalanx compatible with osteomyelitis  Antibiotics alone will not guarantee cure.  Agree with general surgery Dr. Doty evaluation and the patient will need amputation.  However, he declines it at this time.  Wound culture from 2024 was positive for Proteus penneri, Proteus vulgaris, Enterococcus faecalis, Finegoldia magna.  Was treated with a 6-week course of Augmentin.  Afebrile.  CRP on a downward trend.  Wound VAC change was today.  Pictures show some maceration.  RSV URTI  Experienced cough and tested positive for RSV on 3/14/2024.  Cough is improving.  ESRD on hemodialysis  Type 2 diabetes mellitus with polyneuropathy  Class II obesity  Coronary artery disease  Diverticulosis  Comorbid conditions:      Plan  Therapeutic:  Vancomycin and meropenem  Anticipate a 6-week course of antibiotics  Diagnostic:  CRP, ESR  Wound culture taken on 3/18/2024  F/u:  3/12/2024: Blood culture x 2  Othe    Reason for visit: F/u left diabetic foot ulcer and fifth metatarsal osteomyelitis  History:?Interval history noted  Denies n/v/d/f or untoward effects of antimicrobials  Physical Exam:  Vital Signs: /60   Pulse 65   Temp 98 °F (36.7 °C) (Oral)   Resp 16   Ht 1.753 m (5' 9\")   Wt 115.7 kg (255 lb)   SpO2 94%   BMI 37.66 kg/m²     Gen: alert and oriented X3, no distress  Skin: no stigmata of endocarditis  Wounds: Left foot wound VAC   HEMT: AT/NC Oropharynx pink, moist, and without lesions or exudates; dentition in good state of repair  Eyes: PERRLA, EOMI, conjunctiva pink, sclera anicteric.   Neck: Supple. Trachea midline. No LAD.  Chest: no distress and CTA. Good air movement.  Heart: RRR and no MRG.   Abd: soft, non-distended, no

## 2024-03-18 NOTE — CARE COORDINATION
Chart reviewed and patient discussed in IDR. Wound cultures pending. Needs ID final recs. Family to bring in home wound vac. CM following.

## 2024-03-18 NOTE — PROGRESS NOTES
Nephrology Progress Note        [address] [facility], [address] [place] [address]  Phone: [phone]  Office Hours: 8:30AM - 4:30PM  Monday - Friday        3/18/2024 7:21 AM  Subjective:   Admit Date: 3/12/2024  PCP: José Luis Izquierdo MD  Interval History:   ON ROOM AIR  Resting  Hgb below 7 again today  Wound vac on left foot    Diet: ADULT DIET; Regular; 4 carb choices (60 gm/meal); 1500 ml      Data:   Scheduled Meds:   epoetin paola-epbx  14,000 Units IntraVENous Once per day on Mon Wed Fri    vancomycin (VANCOCIN) intermittent dosing (placeholder)   Other RX Placeholder    calcitRIOL  0.5 mcg Oral BID    Vitamin D  5,000 Units Oral Daily    sodium chloride flush  5-40 mL IntraVENous BID    [Held by provider] heparin (porcine)  5,000 Units SubCUTAneous 3 times per day    insulin lispro  0-8 Units SubCUTAneous TID WC    insulin lispro  0-4 Units SubCUTAneous Nightly    amLODIPine  5 mg Oral Daily    ARIPiprazole  5 mg Oral Nightly    atorvastatin  40 mg Oral Nightly    calcium acetate  1 capsule Oral TID WC    carvedilol  25 mg Oral BID WC    hydrALAZINE  25 mg Oral 3 times per day    insulin glargine  10 Units SubCUTAneous Nightly    meropenem  500 mg IntraVENous Q24H     Continuous Infusions:   sodium chloride      sodium chloride 25 mL/hr at 03/17/24 2300    dextrose       PRN Meds:sodium chloride, hydrOXYzine HCl, loperamide, guaiFENesin, albuterol, sodium chloride flush, sodium chloride, ondansetron **OR** ondansetron, polyethylene glycol, acetaminophen **OR** acetaminophen, glucose, dextrose bolus **OR** dextrose bolus, glucagon (rDNA), dextrose, oxyCODONE-acetaminophen  I/O last 3 completed shifts:  In: 240 [P.O.:240]  Out: 200 [Urine:200]  No intake/output data recorded.    Intake/Output Summary (Last 24 hours) at 3/18/2024 0721  Last data filed at 3/17/2024 2306  Gross per 24 hour   Intake 240 ml   Output 100 ml   Net 140 ml       CBC:   Recent Labs     03/16/24  0336 03/16/24  0846

## 2024-03-18 NOTE — PROGRESS NOTES
V2.0  [facility] Hospitalist Progress Note      Name:  Yovanny Levine /Age/Sex: 1975  (48 y.o. male)   MRN & CSN:  [id number] & [id number] Encounter Date/Time: 3/18/2024 11:59 AM EDT    [facility]:  Critical access hospital/[facility]- PCP: José Luis Izquierdo MD       Hospital Day: 7    Assessment and Plan:   Yovanny Levine is a 48 y.o. male with pmh of  coronary artery disease, hypertension, diabetes mellitus type 2, on long-term insulin, diabetic foot ulcer, hyperlipidemia, depression, chronic pain   who presents with Diabetic foot infection (HCC)      Plan:  # Diabetic foot infection  -Referred from wound clinic  -General surgery consulted from ED  -X-ray of the left foot-progressive extensive osteolysis with pathological fracture and deformity at the fifth metatarsal head and neck and fifth digit proximal phalanx compatible with osteomyelitis.  Adjacent large soft tissue ulceration, soft tissue edema and air.  -Patient refuses any amputation, agreeable to wound VAC plan to continue local wound care and antibiotics  -Continue broad-spectrum antibiotics- vancomycin, meropenem will require wound cultures prior to final antibiotic selection, FINALLY wound culture obtained  -ID on board         #.  Recent admission 2024 for sepsis secondary to left foot cellulitis  -Left foot MRI confirmed osteomyelitis-patient underwent incision and drainage, debridement of the left foot     #.  ESRD on HD  -Consult nephrology-Dr Parmar/Dr James  -Patient is on calcium acetate     #.  PUD  -EGD-2023-1 nonbleeding gastric ulcer noted in the gastric fundus.      #.  Coronary artery disease  -History of PCI and stenting to LAD, left circumflex  -[facility]-2022-patent stents  -Patient not taking aspirin   -On atorvastatin, carvedilol-continue     #.  Chronic normocytic anemia  -Likely secondary to end-stage renal disease, hemoglobin 6.5 this morning will order 1 more unit of PRBC has already received 1 unit this admission, will get Epogen with dialysis  fifth metatarsal head and neck and fifth digit proximal phalanx compatible with osteomyelitis. Adjacent large soft tissue ulceration, soft tissue edema and air. Electronically signed by Ericka Reynoso DO      Electronically signed by Aleksandra Reynoso MD on 3/18/2024 at 12:43 PM

## 2024-03-19 LAB
ABO/RH: NORMAL
ANION GAP SERPL CALCULATED.3IONS-SCNC: 16 MMOL/L (ref 7–16)
ANTIBODY SCREEN: NEGATIVE
BASOPHILS ABSOLUTE: 0 K/CU MM
BASOPHILS RELATIVE PERCENT: 0.3 % (ref 0–1)
BUN SERPL-MCNC: 63 MG/DL (ref 6–23)
CALCIUM SERPL-MCNC: 8.8 MG/DL (ref 8.3–10.6)
CHLORIDE BLD-SCNC: 100 MMOL/L (ref 99–110)
CO2: 24 MMOL/L (ref 21–32)
COMPONENT: NORMAL
CREAT SERPL-MCNC: 8.1 MG/DL (ref 0.9–1.3)
CROSSMATCH RESULT: NORMAL
DIFFERENTIAL TYPE: ABNORMAL
EOSINOPHILS ABSOLUTE: 0.2 K/CU MM
EOSINOPHILS RELATIVE PERCENT: 3.1 % (ref 0–3)
GFR SERPL CREATININE-BSD FRML MDRD: 8 ML/MIN/1.73M2
GLUCOSE BLD-MCNC: 139 MG/DL (ref 70–99)
GLUCOSE BLD-MCNC: 165 MG/DL (ref 70–99)
GLUCOSE BLD-MCNC: 167 MG/DL (ref 70–99)
GLUCOSE BLD-MCNC: 194 MG/DL (ref 70–99)
GLUCOSE SERPL-MCNC: 126 MG/DL (ref 70–99)
HCT VFR BLD CALC: 25.7 % (ref 42–52)
HEMOGLOBIN: 8.1 GM/DL (ref 13.5–18)
IMMATURE NEUTROPHIL %: 1 % (ref 0–0.43)
LYMPHOCYTES ABSOLUTE: 0.8 K/CU MM
LYMPHOCYTES RELATIVE PERCENT: 12.7 % (ref 24–44)
MCH RBC QN AUTO: 29.8 PG (ref 27–31)
MCHC RBC AUTO-ENTMCNC: 31.5 % (ref 32–36)
MCV RBC AUTO: 94.5 FL (ref 78–100)
MONOCYTES ABSOLUTE: 0.6 K/CU MM
MONOCYTES RELATIVE PERCENT: 10 % (ref 0–4)
NUCLEATED RBC %: 0 %
PDW BLD-RTO: 16.1 % (ref 11.7–14.9)
PLATELET # BLD: 161 K/CU MM (ref 140–440)
PMV BLD AUTO: 10.1 FL (ref 7.5–11.1)
POTASSIUM SERPL-SCNC: 4.5 MMOL/L (ref 3.5–5.1)
RBC # BLD: 2.72 M/CU MM (ref 4.6–6.2)
SEGMENTED NEUTROPHILS ABSOLUTE COUNT: 4.3 K/CU MM
SEGMENTED NEUTROPHILS RELATIVE PERCENT: 72.9 % (ref 36–66)
SODIUM BLD-SCNC: 140 MMOL/L (ref 135–145)
STATUS: NORMAL
TOTAL IMMATURE NEUTOROPHIL: 0.06 K/CU MM
TOTAL NUCLEATED RBC: 0 K/CU MM
TRANSFUSION STATUS: NORMAL
UNIT DIVISION: 0
UNIT NUMBER: NORMAL
WBC # BLD: 5.9 K/CU MM (ref 4–10.5)

## 2024-03-19 PROCEDURE — 6360000002 HC RX W HCPCS: Performed by: INTERNAL MEDICINE

## 2024-03-19 PROCEDURE — 99232 SBSQ HOSP IP/OBS MODERATE 35: CPT | Performed by: INTERNAL MEDICINE

## 2024-03-19 PROCEDURE — 85025 COMPLETE CBC W/AUTO DIFF WBC: CPT

## 2024-03-19 PROCEDURE — 2580000003 HC RX 258: Performed by: INTERNAL MEDICINE

## 2024-03-19 PROCEDURE — 1200000000 HC SEMI PRIVATE

## 2024-03-19 PROCEDURE — 82962 GLUCOSE BLOOD TEST: CPT

## 2024-03-19 PROCEDURE — 6370000000 HC RX 637 (ALT 250 FOR IP): Performed by: INTERNAL MEDICINE

## 2024-03-19 PROCEDURE — 6370000000 HC RX 637 (ALT 250 FOR IP): Performed by: FAMILY MEDICINE

## 2024-03-19 PROCEDURE — 2500000003 HC RX 250 WO HCPCS: Performed by: INTERNAL MEDICINE

## 2024-03-19 PROCEDURE — 80048 BASIC METABOLIC PNL TOTAL CA: CPT

## 2024-03-19 PROCEDURE — 36415 COLL VENOUS BLD VENIPUNCTURE: CPT

## 2024-03-19 RX ORDER — CALCIUM CARBONATE 500 MG/1
500 TABLET, CHEWABLE ORAL 3 TIMES DAILY PRN
Status: DISCONTINUED | OUTPATIENT
Start: 2024-03-19 | End: 2024-03-20 | Stop reason: HOSPADM

## 2024-03-19 RX ADMIN — OXYCODONE AND ACETAMINOPHEN 1 TABLET: 7.5; 325 TABLET ORAL at 19:24

## 2024-03-19 RX ADMIN — Medication 5000 UNITS: at 09:27

## 2024-03-19 RX ADMIN — CALCITRIOL CAPSULES 0.25 MCG 0.5 MCG: 0.25 CAPSULE ORAL at 20:43

## 2024-03-19 RX ADMIN — SODIUM CHLORIDE, PRESERVATIVE FREE 10 ML: 5 INJECTION INTRAVENOUS at 20:45

## 2024-03-19 RX ADMIN — SODIUM CHLORIDE, PRESERVATIVE FREE 10 ML: 5 INJECTION INTRAVENOUS at 09:28

## 2024-03-19 RX ADMIN — CALCIUM ACETATE 667 MG: 667 CAPSULE ORAL at 12:21

## 2024-03-19 RX ADMIN — HYDRALAZINE HYDROCHLORIDE 25 MG: 25 TABLET ORAL at 09:28

## 2024-03-19 RX ADMIN — CALCIUM CARBONATE 500 MG: 500 TABLET, CHEWABLE ORAL at 07:09

## 2024-03-19 RX ADMIN — AMLODIPINE BESYLATE 5 MG: 5 TABLET ORAL at 09:28

## 2024-03-19 RX ADMIN — SODIUM CHLORIDE: 9 INJECTION, SOLUTION INTRAVENOUS at 20:48

## 2024-03-19 RX ADMIN — CALCITRIOL CAPSULES 0.25 MCG 0.5 MCG: 0.25 CAPSULE ORAL at 09:27

## 2024-03-19 RX ADMIN — CARVEDILOL 25 MG: 25 TABLET, FILM COATED ORAL at 16:53

## 2024-03-19 RX ADMIN — ATORVASTATIN CALCIUM 40 MG: 40 TABLET, FILM COATED ORAL at 20:43

## 2024-03-19 RX ADMIN — CARVEDILOL 25 MG: 25 TABLET, FILM COATED ORAL at 09:27

## 2024-03-19 RX ADMIN — CALCIUM ACETATE 667 MG: 667 CAPSULE ORAL at 09:27

## 2024-03-19 RX ADMIN — OXYCODONE AND ACETAMINOPHEN 1 TABLET: 7.5; 325 TABLET ORAL at 06:29

## 2024-03-19 RX ADMIN — HYDRALAZINE HYDROCHLORIDE 25 MG: 25 TABLET ORAL at 23:52

## 2024-03-19 RX ADMIN — INSULIN GLARGINE 10 UNITS: 100 INJECTION, SOLUTION SUBCUTANEOUS at 20:41

## 2024-03-19 RX ADMIN — ARIPIPRAZOLE 5 MG: 10 TABLET ORAL at 20:42

## 2024-03-19 RX ADMIN — MEROPENEM 500 MG: 500 INJECTION, POWDER, FOR SOLUTION INTRAVENOUS at 20:49

## 2024-03-19 RX ADMIN — OXYCODONE AND ACETAMINOPHEN 1 TABLET: 7.5; 325 TABLET ORAL at 12:25

## 2024-03-19 RX ADMIN — CALCIUM ACETATE 667 MG: 667 CAPSULE ORAL at 16:53

## 2024-03-19 RX ADMIN — HYDRALAZINE HYDROCHLORIDE 25 MG: 25 TABLET ORAL at 16:53

## 2024-03-19 ASSESSMENT — PAIN DESCRIPTION - ORIENTATION
ORIENTATION: LEFT

## 2024-03-19 ASSESSMENT — PAIN SCALES - GENERAL
PAINLEVEL_OUTOF10: 0
PAINLEVEL_OUTOF10: 5
PAINLEVEL_OUTOF10: 7
PAINLEVEL_OUTOF10: 0
PAINLEVEL_OUTOF10: 8
PAINLEVEL_OUTOF10: 8
PAINLEVEL_OUTOF10: 0
PAINLEVEL_OUTOF10: 0
PAINLEVEL_OUTOF10: 8

## 2024-03-19 ASSESSMENT — PAIN DESCRIPTION - LOCATION
LOCATION: EAR;FOOT
LOCATION: FOOT

## 2024-03-19 ASSESSMENT — PAIN DESCRIPTION - DESCRIPTORS
DESCRIPTORS: BURNING;STABBING
DESCRIPTORS: ACHING;THROBBING
DESCRIPTORS: PENETRATING;THROBBING
DESCRIPTORS: ACHING;THROBBING

## 2024-03-19 ASSESSMENT — PAIN SCALES - WONG BAKER: WONGBAKER_NUMERICALRESPONSE: HURTS EVEN MORE

## 2024-03-19 ASSESSMENT — PAIN DESCRIPTION - PAIN TYPE: TYPE: ACUTE PAIN

## 2024-03-19 ASSESSMENT — PAIN DESCRIPTION - ONSET: ONSET: ON-GOING

## 2024-03-19 NOTE — PROGRESS NOTES
Ideal  Protein (g/day): 87 (1.2 g/kg)  Method Used for Fluid Requirements: Standard Renal  Fluid (ml/day): per nephrology    Nutrition Diagnosis:   Increased nutrient needs related to renal dysfunction as evidenced by wounds, dialysis    Nutrition Interventions:   Food and/or Nutrient Delivery: Continue Current Diet  Nutrition Education/Counseling: No recommendation at this time  Coordination of Nutrition Care: Continue to monitor while inpatient  Plan of Care discussed with: patient    Goals:     Goals: PO intake 75% or greater, by next RD assessment       Nutrition Monitoring and Evaluation:   Behavioral-Environmental Outcomes: None Identified  Food/Nutrient Intake Outcomes: Diet Advancement/Tolerance, Food and Nutrient Intake  Physical Signs/Symptoms Outcomes: Biochemical Data, Skin, Weight, Meal Time Behavior    Discharge Planning:    Continue current diet     Karla Hennessy, MIGUEL ANGEL, LD  Contact: [phone]

## 2024-03-19 NOTE — PROGRESS NOTES
Infectious Disease Progress Note  3/19/2024   Patient Name: Yovanny Levine : 1975     Assessment  Left foot diabetic ulcer and 5th metatarsal osteomyelitis  Long hx of ulcer and x-ray revealed a pathologic fracture of the metatarsal head, neck and fifth digit proximal phalanx compatible with osteomyelitis  Antibiotics alone will not guarantee cure.  Agree with general surgery Dr. Doty evaluation and the patient will need amputation.  However, he declines it at this time.  Wound culture from 2024 was positive for Proteus penneri, Proteus vulgaris, Enterococcus faecalis, Finegoldia magna.  Was treated with a 6-week course of Augmentin.  3/12/2024: Blood culture x 2 negative   Afebrile.  CRP on a downward trend.  Wound VAC change was today.  Pictures show some maceration.  RSV URTI  Experienced cough and tested positive for RSV on 3/14/2024.  Cough is improving.  ESRD on hemodialysis  Type 2 diabetes mellitus with polyneuropathy  Class II obesity  Coronary artery disease  Diverticulosis  Comorbid conditions:      Plan  Therapeutic:  Vancomycin and meropenem  Anticipate a 6-week course of antibiotics  Diagnostic:  CRP, ESR  Wound culture taken on 3/18/2024  F/u:    Othe    Reason for visit: F/u left diabetic foot ulcer and fifth metatarsal osteomyelitis  History:?Interval history noted  Denies n/v/d/f or untoward effects of antimicrobials  Physical Exam:  Vital Signs: BP (!) 144/82   Pulse 70   Temp 98.2 °F (36.8 °C) (Oral)   Resp 16   Ht 1.753 m (5' 9\")   Wt 115.7 kg (255 lb)   SpO2 95%   BMI 37.66 kg/m²     Gen: alert and oriented X3, no distress  Skin: no stigmata of endocarditis  Wounds: Left foot wound VAC   HEMT: AT/NC Oropharynx pink, moist, and without lesions or exudates; dentition in good state of repair  Eyes: PERRLA, EOMI, conjunctiva pink, sclera anicteric.   Neck: Supple. Trachea midline. No LAD.  Chest: no distress and CTA. Good air movement.  Heart: RRR and no MRG.   Abd: soft,

## 2024-03-19 NOTE — PROGRESS NOTES
Nephrology Progress Note        [address] [facility], [address]  [place] [address]  Phone: [phone]  Office Hours: 8:30AM - 4:30PM  Monday - Friday        3/19/2024 7:55 AM  Subjective:   Admit Date: 3/12/2024  PCP: José Luis Izquierdo MD  Interval History: doing ok  On room air  S/p HD yesterday  He is wondering if he can go see his mom who is on hospice upstairs    Diet: ADULT DIET; Regular; 4 carb choices (60 gm/meal); 1500 ml      Data:   Scheduled Meds:   epoetin paola-epbx  14,000 Units IntraVENous Once per day on Mon Wed Fri    vancomycin (VANCOCIN) intermittent dosing (placeholder)   Other RX Placeholder    calcitRIOL  0.5 mcg Oral BID    Vitamin D  5,000 Units Oral Daily    sodium chloride flush  5-40 mL IntraVENous BID    [Held by provider] heparin (porcine)  5,000 Units SubCUTAneous 3 times per day    insulin lispro  0-8 Units SubCUTAneous TID WC    insulin lispro  0-4 Units SubCUTAneous Nightly    amLODIPine  5 mg Oral Daily    ARIPiprazole  5 mg Oral Nightly    atorvastatin  40 mg Oral Nightly    calcium acetate  1 capsule Oral TID WC    carvedilol  25 mg Oral BID WC    hydrALAZINE  25 mg Oral 3 times per day    insulin glargine  10 Units SubCUTAneous Nightly    meropenem  500 mg IntraVENous Q24H     Continuous Infusions:   sodium chloride 250 mL/hr at 03/18/24 2159    dextrose       PRN Meds:calcium carbonate, vashe wound therapy, hydrOXYzine HCl, loperamide, guaiFENesin, albuterol, sodium chloride flush, sodium chloride, ondansetron **OR** ondansetron, polyethylene glycol, acetaminophen **OR** acetaminophen, glucose, dextrose bolus **OR** dextrose bolus, glucagon (rDNA), dextrose, oxyCODONE-acetaminophen  I/O last 3 completed shifts:  In: 1161.7 [P.O.:240; Blood:421.7]  Out: 3600 [Urine:100]  No intake/output data recorded.    Intake/Output Summary (Last 24 hours) at 3/19/2024 0755  Last data filed at 3/18/2024 1621  Gross per 24 hour   Intake 1161.7 ml   Output 3500 ml   Net -2338.3 ml

## 2024-03-19 NOTE — PROGRESS NOTES
extensive osteolysis with pathologic fracture and deformity at the fifth metatarsal head and neck and fifth digit proximal phalanx compatible with osteomyelitis. Adjacent large soft tissue ulceration, soft tissue edema and air. Electronically signed by Ericka Reynoso DO         Electronically signed by Dean Mcclendon MD on 3/19/2024 at 12:54 PM

## 2024-03-19 NOTE — PROGRESS NOTES
PHARMACY VANCOMYCIN MONITORING SERVICE  Pharmacy consulted by Dr. Stuart for monitoring and adjustment.    Indication for treatment: Vancomycin indication: Bone/Joint infection   Goal trough: Trough Goal: 15-20 mcg/mL  AUC/PAMELA: 400-600    Risk Factors for MRSA Identified:   Hospitalization within the past 90 days, Received IV antibiotics within the past 90 days, Patient on hemodialysis, Purulent and/or complicated SSTI    Pertinent Laboratory Values:   Temp Readings from Last 3 Encounters:   03/19/24 98.2 °F (36.8 °C) (Oral)   03/12/24 98.7 °F (37.1 °C) (Temporal)   03/05/24 98.2 °F (36.8 °C) (Temporal)     Recent Labs     03/17/24  0446 03/18/24  0617 03/19/24  0323   WBC 6.0 5.3 5.9       Recent Labs     03/17/24  0446 03/18/24  0617 03/19/24  0323   BUN 74* 90* 63*   CREATININE 9.9* 10.9* 8.1*       Estimated Creatinine Clearance: 14 mL/min (A) (based on SCr of 8.1 mg/dL (H)).    Intake/Output Summary (Last 24 hours) at 3/19/2024 1136  Last data filed at 3/18/2024 1621  Gross per 24 hour   Intake 921.7 ml   Output 3500 ml   Net -2578.3 ml       Last Encounter Weight:  Wt Readings from Last 3 Encounters:   03/12/24 115.7 kg (255 lb)   02/12/24 114.4 kg (252 lb 3.2 oz)   01/10/24 114.3 kg (252 lb)     Pertinent Cultures:   Date    Source    Results  03/12/24  Blood    NGTD x72h  03/14/24  Respiratory Panel  RSV  3/18                            Foot wound Cx                        In process    VANCOMYCIN TROUGH:  No results for input(s): \"VANCOTROUGH\" in the last 72 hours.  VANCOMYCIN RANDOM:    Recent Labs     03/18/24  0617   VANCORANDOM 16.6       Assessment:  HPI: 48 y.o. male with history of CAD, HTN, DM type II, HLD, and diabetic foot ulcer. Patient presented to ED with an open wound on his left foot. X-ray of left foot shows progressive extensive osteomyelitis.  3/13 - Patient declines amputation. Local wound and antibiotics to continue. ID consulted.  3/14 - ID notes to continue vancomycin.  3/18 - ID  notes antibiotics continue for a 6 week course  3/19 - Foot culture in process from yesterday.  SCr, BUN, and urine output:  ESRD on HD on M/W/F, did receive HD yesterday.  Limited urine output recorded  Day(s) of therapy: 8 (ID anticipating 6 week course)  Vancomycin concentration:   3/15 -  25.8 mg/L, pre-hd level, no dose needed today   3/18 - 16.6, pre-HD level, ok to re-dose    Plan:  Continue intermittent vancomycin dosing based on pre-dialysis levels  Vancomycin 1500mg ivpb x1 dose given after HD yesterday.  Recheck vanco level on Wednesday pre-HD.  Pharmacy will continue to monitor patient and adjust therapy as indicated    VANCOMYCIN CONCENTRATION ORDERED FOR 3/20 @06:00    Thank you for the consult.  Judah Franco RPH  3/19/2024 11:36 AM

## 2024-03-20 VITALS
BODY MASS INDEX: 37.77 KG/M2 | DIASTOLIC BLOOD PRESSURE: 82 MMHG | HEART RATE: 63 BPM | RESPIRATION RATE: 16 BRPM | HEIGHT: 69 IN | WEIGHT: 255 LBS | OXYGEN SATURATION: 98 % | TEMPERATURE: 97.9 F | SYSTOLIC BLOOD PRESSURE: 145 MMHG

## 2024-03-20 LAB
ANION GAP SERPL CALCULATED.3IONS-SCNC: 19 MMOL/L (ref 7–16)
BASOPHILS ABSOLUTE: 0 K/CU MM
BASOPHILS RELATIVE PERCENT: 0.7 % (ref 0–1)
BUN SERPL-MCNC: 75 MG/DL (ref 6–23)
CALCIUM SERPL-MCNC: 9.1 MG/DL (ref 8.3–10.6)
CHLORIDE BLD-SCNC: 97 MMOL/L (ref 99–110)
CO2: 21 MMOL/L (ref 21–32)
CREAT SERPL-MCNC: 9.2 MG/DL (ref 0.9–1.3)
DIFFERENTIAL TYPE: ABNORMAL
DOSE AMOUNT: NORMAL
DOSE TIME: NORMAL
EOSINOPHILS ABSOLUTE: 0.7 K/CU MM
EOSINOPHILS RELATIVE PERCENT: 11.4 % (ref 0–3)
GFR SERPL CREATININE-BSD FRML MDRD: 6 ML/MIN/1.73M2
GLUCOSE BLD-MCNC: 114 MG/DL (ref 70–99)
GLUCOSE BLD-MCNC: 227 MG/DL (ref 70–99)
GLUCOSE SERPL-MCNC: 145 MG/DL (ref 70–99)
HCT VFR BLD CALC: 29.2 % (ref 42–52)
HEMOGLOBIN: 9.3 GM/DL (ref 13.5–18)
IMMATURE NEUTROPHIL %: 1 % (ref 0–0.43)
LYMPHOCYTES ABSOLUTE: 0.4 K/CU MM
LYMPHOCYTES RELATIVE PERCENT: 7.2 % (ref 24–44)
MCH RBC QN AUTO: 30.4 PG (ref 27–31)
MCHC RBC AUTO-ENTMCNC: 31.8 % (ref 32–36)
MCV RBC AUTO: 95.4 FL (ref 78–100)
MONOCYTES ABSOLUTE: 0.6 K/CU MM
MONOCYTES RELATIVE PERCENT: 9.7 % (ref 0–4)
NUCLEATED RBC %: 9.2 %
PDW BLD-RTO: 16 % (ref 11.7–14.9)
PLATELET # BLD: 179 K/CU MM (ref 140–440)
PMV BLD AUTO: 10 FL (ref 7.5–11.1)
POTASSIUM SERPL-SCNC: 4.7 MMOL/L (ref 3.5–5.1)
RBC # BLD: 3.06 M/CU MM (ref 4.6–6.2)
SEGMENTED NEUTROPHILS ABSOLUTE COUNT: 4.2 K/CU MM
SEGMENTED NEUTROPHILS RELATIVE PERCENT: 70 % (ref 36–66)
SODIUM BLD-SCNC: 137 MMOL/L (ref 135–145)
TOTAL IMMATURE NEUTOROPHIL: 0.06 K/CU MM
TOTAL NUCLEATED RBC: 0.6 K/CU MM
VANCOMYCIN RANDOM: 22.5 UG/ML
WBC # BLD: 6 K/CU MM (ref 4–10.5)

## 2024-03-20 PROCEDURE — 6370000000 HC RX 637 (ALT 250 FOR IP): Performed by: INTERNAL MEDICINE

## 2024-03-20 PROCEDURE — 94761 N-INVAS EAR/PLS OXIMETRY MLT: CPT

## 2024-03-20 PROCEDURE — 36415 COLL VENOUS BLD VENIPUNCTURE: CPT

## 2024-03-20 PROCEDURE — 85025 COMPLETE CBC W/AUTO DIFF WBC: CPT

## 2024-03-20 PROCEDURE — 80202 ASSAY OF VANCOMYCIN: CPT

## 2024-03-20 PROCEDURE — 6360000002 HC RX W HCPCS: Performed by: INTERNAL MEDICINE

## 2024-03-20 PROCEDURE — 90935 HEMODIALYSIS ONE EVALUATION: CPT

## 2024-03-20 PROCEDURE — 2500000003 HC RX 250 WO HCPCS: Performed by: INTERNAL MEDICINE

## 2024-03-20 PROCEDURE — 99232 SBSQ HOSP IP/OBS MODERATE 35: CPT | Performed by: INTERNAL MEDICINE

## 2024-03-20 PROCEDURE — 80048 BASIC METABOLIC PNL TOTAL CA: CPT

## 2024-03-20 PROCEDURE — 94640 AIRWAY INHALATION TREATMENT: CPT

## 2024-03-20 PROCEDURE — 82962 GLUCOSE BLOOD TEST: CPT

## 2024-03-20 PROCEDURE — 97605 NEG PRS WND THER DME<=50SQCM: CPT

## 2024-03-20 PROCEDURE — 6360000002 HC RX W HCPCS: Performed by: NURSE PRACTITIONER

## 2024-03-20 RX ORDER — CALCITRIOL 0.5 UG/1
0.5 CAPSULE, LIQUID FILLED ORAL 2 TIMES DAILY
Qty: 30 CAPSULE | Refills: 0 | Status: SHIPPED | OUTPATIENT
Start: 2024-03-20

## 2024-03-20 RX ORDER — SODIUM CHLOR/HYPOCHLOROUS ACID 0.033 %
SOLUTION, IRRIGATION IRRIGATION
Qty: 475 ML | Refills: 0 | Status: SHIPPED | OUTPATIENT
Start: 2024-03-20

## 2024-03-20 RX ORDER — CIPROFLOXACIN 500 MG/1
750 TABLET, FILM COATED ORAL
Status: DISCONTINUED | OUTPATIENT
Start: 2024-03-20 | End: 2024-03-20 | Stop reason: HOSPADM

## 2024-03-20 RX ORDER — PREGABALIN 75 MG/1
75 CAPSULE ORAL DAILY
Status: DISCONTINUED | OUTPATIENT
Start: 2024-03-20 | End: 2024-03-20 | Stop reason: HOSPADM

## 2024-03-20 RX ORDER — ALBUTEROL SULFATE 90 UG/1
2 AEROSOL, METERED RESPIRATORY (INHALATION) 4 TIMES DAILY PRN
Qty: 18 G | Refills: 0 | Status: SHIPPED | OUTPATIENT
Start: 2024-03-20

## 2024-03-20 RX ORDER — PREGABALIN 75 MG/1
75 CAPSULE ORAL DAILY
Qty: 30 CAPSULE | Refills: 0 | Status: SHIPPED | OUTPATIENT
Start: 2024-03-21 | End: 2024-04-20

## 2024-03-20 RX ORDER — VITAMIN B COMPLEX
5000 TABLET ORAL DAILY
Qty: 150 TABLET | Refills: 0 | Status: SHIPPED | OUTPATIENT
Start: 2024-03-21 | End: 2024-04-20

## 2024-03-20 RX ORDER — ALBUTEROL SULFATE 90 UG/1
2 AEROSOL, METERED RESPIRATORY (INHALATION) EVERY 6 HOURS PRN
Status: DISCONTINUED | OUTPATIENT
Start: 2024-03-20 | End: 2024-03-20 | Stop reason: HOSPADM

## 2024-03-20 RX ORDER — CIPROFLOXACIN 750 MG/1
750 TABLET, FILM COATED ORAL
Qty: 41 TABLET | Refills: 0 | Status: SHIPPED | OUTPATIENT
Start: 2024-03-21 | End: 2024-05-01

## 2024-03-20 RX ADMIN — ALBUTEROL SULFATE 2.5 MG: 2.5 SOLUTION RESPIRATORY (INHALATION) at 12:53

## 2024-03-20 RX ADMIN — Medication 5000 UNITS: at 09:38

## 2024-03-20 RX ADMIN — CALCIUM ACETATE 667 MG: 667 CAPSULE ORAL at 17:05

## 2024-03-20 RX ADMIN — EPOETIN ALFA-EPBX 10000 UNITS: 10000 INJECTION, SOLUTION INTRAVENOUS; SUBCUTANEOUS at 14:16

## 2024-03-20 RX ADMIN — INSULIN LISPRO 2 UNITS: 100 INJECTION, SOLUTION INTRAVENOUS; SUBCUTANEOUS at 12:16

## 2024-03-20 RX ADMIN — HYDRALAZINE HYDROCHLORIDE 25 MG: 25 TABLET ORAL at 09:38

## 2024-03-20 RX ADMIN — OXYCODONE AND ACETAMINOPHEN 1 TABLET: 7.5; 325 TABLET ORAL at 02:04

## 2024-03-20 RX ADMIN — CALCITRIOL CAPSULES 0.25 MCG 0.5 MCG: 0.25 CAPSULE ORAL at 09:38

## 2024-03-20 RX ADMIN — CALCIUM ACETATE 667 MG: 667 CAPSULE ORAL at 09:38

## 2024-03-20 RX ADMIN — PREGABALIN 75 MG: 75 CAPSULE ORAL at 12:29

## 2024-03-20 RX ADMIN — HYDRALAZINE HYDROCHLORIDE 25 MG: 25 TABLET ORAL at 17:05

## 2024-03-20 RX ADMIN — CARVEDILOL 25 MG: 25 TABLET, FILM COATED ORAL at 17:05

## 2024-03-20 RX ADMIN — CARVEDILOL 25 MG: 25 TABLET, FILM COATED ORAL at 09:38

## 2024-03-20 RX ADMIN — AMLODIPINE BESYLATE 5 MG: 5 TABLET ORAL at 09:42

## 2024-03-20 RX ADMIN — OXYCODONE AND ACETAMINOPHEN 1 TABLET: 7.5; 325 TABLET ORAL at 18:00

## 2024-03-20 RX ADMIN — CALCIUM ACETATE 667 MG: 667 CAPSULE ORAL at 12:33

## 2024-03-20 RX ADMIN — EPOETIN ALFA-EPBX 4000 UNITS: 2000 INJECTION, SOLUTION INTRAVENOUS; SUBCUTANEOUS at 14:16

## 2024-03-20 RX ADMIN — OXYCODONE AND ACETAMINOPHEN 1 TABLET: 7.5; 325 TABLET ORAL at 09:38

## 2024-03-20 RX ADMIN — CIPROFLOXACIN 750 MG: 500 TABLET, FILM COATED ORAL at 12:33

## 2024-03-20 ASSESSMENT — PAIN SCALES - GENERAL
PAINLEVEL_OUTOF10: 8
PAINLEVEL_OUTOF10: 0
PAINLEVEL_OUTOF10: 8
PAINLEVEL_OUTOF10: 0
PAINLEVEL_OUTOF10: 8
PAINLEVEL_OUTOF10: 0

## 2024-03-20 ASSESSMENT — PAIN DESCRIPTION - ORIENTATION
ORIENTATION: RIGHT;LEFT
ORIENTATION: RIGHT;LEFT
ORIENTATION: LEFT

## 2024-03-20 ASSESSMENT — PAIN DESCRIPTION - DESCRIPTORS
DESCRIPTORS: BURNING;STABBING
DESCRIPTORS: ACHING
DESCRIPTORS: ACHING

## 2024-03-20 ASSESSMENT — PAIN DESCRIPTION - LOCATION
LOCATION: FOOT

## 2024-03-20 ASSESSMENT — PAIN SCALES - WONG BAKER: WONGBAKER_NUMERICALRESPONSE: HURTS EVEN MORE

## 2024-03-20 ASSESSMENT — PAIN DESCRIPTION - ONSET: ONSET: ON-GOING

## 2024-03-20 ASSESSMENT — PAIN DESCRIPTION - PAIN TYPE: TYPE: ACUTE PAIN

## 2024-03-20 ASSESSMENT — PAIN DESCRIPTION - FREQUENCY: FREQUENCY: CONTINUOUS

## 2024-03-20 NOTE — PROGRESS NOTES
This nurse called and notified [facility] of patient discharge. This nurse also faxed  AVS to [facility]

## 2024-03-20 NOTE — PROGRESS NOTES
Infectious Disease Progress Note  3/20/2024   Patient Name: Yovanny Levine : 1975     Assessment  Left foot diabetic ulcer and 5th metatarsal osteomyelitis  Long hx of ulcer and x-ray revealed a pathologic fracture of the metatarsal head, neck and fifth digit proximal phalanx compatible with osteomyelitis  Antibiotics alone will not guarantee cure.  Agree with general surgery Dr. Doty evaluation and the patient will need amputation.  However, he declines it at this time.  Wound culture from 2024 was positive for Proteus penneri, Proteus vulgaris, Enterococcus faecalis, Finegoldia magna.  Was treated with a 6-week course of Augmentin.  3/12/2024: Blood culture x 2 negative   Wound cx: MDR Proteus vulgaris  Afebrile.  CRP on a downward trend.  RSV URTI  Experienced cough and tested positive for RSV on 3/14/2024.  Cough is improving.  ESRD on hemodialysis  Type 2 diabetes mellitus with polyneuropathy  Class II obesity  Coronary artery disease  Diverticulosis  Comorbid conditions:      Plan  Therapeutic:  Discontinue Vancomycin and meropenem  6 week course of ciprofloxacin 750 mg po daily (EOT: 2024)  Diagnostic:    F/u:  Wound culture taken on 3/18/2024  Other  Patient may be discharged from infectious diseases service viewpoint. Follow in the office in 2 weeks.     Reason for visit: F/u left diabetic foot ulcer and fifth metatarsal osteomyelitis  History:?Interval history noted  Denies n/v/d/f or untoward effects of antimicrobials  Physical Exam:  Vital Signs: BP (!) 142/90   Pulse 61   Temp 98 °F (36.7 °C)   Resp 18   Ht 1.753 m (5' 9\")   Wt 115.7 kg (255 lb)   SpO2 98%   BMI 37.66 kg/m²     Gen: alert and oriented X3, no distress  Skin: no stigmata of endocarditis  Wounds: Left foot wound VAC   HEMT: AT/NC Oropharynx pink, moist, and without lesions or exudates; dentition in good state of repair  Eyes: PERRLA, EOMI, conjunctiva pink, sclera anicteric.   Neck: Supple. Trachea midline. No  LAD.  Chest: no distress and CTA. Good air movement.  Heart: RRR and no MRG.   Abd: soft, non-distended, no tenderness, no hepatomegaly. Normoactive bowel sounds.  Ext: no clubbing, cyanosis, or edema  Catheter Site: without erythema or tenderness  LDA:  Neuro: Mental status intact. CN 2-12 intact and no focal sensory or motor deficits     Radiologic / Imaging / TESTING  No results found.     Labs:    Recent Results (from the past 24 hour(s))   POCT Glucose    Collection Time: 03/19/24  4:42 PM   Result Value Ref Range    POC Glucose 194 (H) 70 - 99 MG/DL   POCT Glucose    Collection Time: 03/19/24  8:31 PM   Result Value Ref Range    POC Glucose 167 (H) 70 - 99 MG/DL   CBC with Auto Differential    Collection Time: 03/20/24  2:35 AM   Result Value Ref Range    WBC 6.0 4.0 - 10.5 K/CU MM    RBC 3.06 (L) 4.6 - 6.2 M/CU MM    Hemoglobin 9.3 (L) 13.5 - 18.0 GM/DL    Hematocrit 29.2 (L) 42 - 52 %    MCV 95.4 78 - 100 FL    MCH 30.4 27 - 31 PG    MCHC 31.8 (L) 32.0 - 36.0 %    RDW 16.0 (H) 11.7 - 14.9 %    Platelets 179 140 - 440 K/CU MM    MPV 10.0 7.5 - 11.1 FL    Differential Type AUTOMATED DIFFERENTIAL     Segs Relative 70.0 (H) 36 - 66 %    Lymphocytes % 7.2 (L) 24 - 44 %    Monocytes % 9.7 (H) 0 - 4 %    Eosinophils % 11.4 (H) 0 - 3 %    Basophils % 0.7 0 - 1 %    Segs Absolute 4.2 K/CU MM    Lymphocytes Absolute 0.4 K/CU MM    Monocytes Absolute 0.6 K/CU MM    Eosinophils Absolute 0.7 K/CU MM    Basophils Absolute 0.0 K/CU MM    Nucleated RBC % 9.2 %    Total Nucleated RBC 0.6 K/CU MM    Total Immature Neutrophil 0.06 K/CU MM    Immature Neutrophil % 1.0 (H) 0 - 0.43 %   Basic Metabolic Panel    Collection Time: 03/20/24  2:35 AM   Result Value Ref Range    Sodium 137 135 - 145 MMOL/L    Potassium 4.7 3.5 - 5.1 MMOL/L    Chloride 97 (L) 99 - 110 mMol/L    CO2 21 21 - 32 MMOL/L    Anion Gap 19 (H) 7 - 16    Glucose 145 (H) 70 - 99 MG/DL    BUN 75 (H) 6 - 23 MG/DL    Creatinine 9.2 (H) 0.9 - 1.3 MG/DL    Est, Glom

## 2024-03-20 NOTE — DISCHARGE SUMMARY
Patient : Mildred Crouch Age: 35 year old Sex: female   MRN: [id number] Encounter Date: 6/12/2020      History     Chief Complaint   Patient presents with   • Headache New Onset on New Symptom   • Hypertension     HPI The patient is a 34 yo female with PMH significant for CAD, afib, CVA with residual right sided deficits, anxiety, bipolar disorder and polysubstance abuse (alcohol and cocaine) who presents with headache, nausea and vomiting. She reports feeling unwell over the past 2-3 days with chest pain, SOB, palpitations, HA, myalgias, nausea and vomiting. Pt denies any fever, chills, abd pain, weakness, dysuria. She was seen at [place] three times in the past few days for evaluation. She had extensive w/u including screening labs, EKG, CXR, CT head and CT angio chest that did not show any evidence of acute abnormality. The patient was treated symptomatically, including management of elevated BP and was d/c with outpatient follow up. Pt has continued to drink alcohol, but denies any illicit drug use during this time frame. She was offered resources for substance abuse treatment during her visits, but requested outpatient resources. Pt has a care plan in place for multiple ED visits related to elevated BP, anxiety causing chest pain and SOB. She reports taking her medications as prescribed (with metoprolol as her only medication affecting BP). Pt is on coumadin for her CVA history, which was felt to be embolic, related to afib and PFO. Pt is a 1/4 ppd smoker.    Allergies   Allergen Reactions   • Amoxicillin ANAPHYLAXIS     \"throat swelling, trouble breathing\"   • Epinephrine ANXIETY, HEADACHES, SHORTNESS OF BREATH and Palpitations     Patient wants Epinephrine given if she needs it for anaphylactic reaction.    • Reglan SHORTNESS OF BREATH   • Atorvastatin MYALGIA   • Bactrim [Sulfamethoxazole W/Trimethoprim] Other (See Comments)     Self-reported   • Banana Other (See Comments)     Burning and Swelling     • Benadryl  Please use this note as a progress note for the day if the patient does not discharge today      Discharge Summary    Name:  Yovanny Levine /Age/Sex: 1975  (48 y.o. male)   MRN & CSN:  [id number] & [id number] Admission Date/Time: 3/12/2024  6:33 PM   Attending:  Dean Mcclendon MD Discharging Physician: Dean Mcclendon MD       Admission Diagnosis:   Diabetic foot infection   ESRD on HD   PUD   CAD   Chronic Anemia   HTN   Type II DM on insulin   DM Foot ulcer   H/O HLD   Chronic pain   Obesity Class II     Discharge Diagnosis, hospital course, assessment and plan:  Yovanny Levine is a 48 y.o.  male  who presents with Diabetic foot infection (HCC)    Patient admitted on 3/12/2024  6:33 PM    Per H+P:  Yovanny Levine is a 48 y.o. male with coronary artery disease, hypertension, diabetes mellitus type 2, on long-term insulin, diabetic foot ulcer, hyperlipidemia, depression, chronic pain was referred to ED from wound clinic.  Patient has an open wound on his left plantar aspect, was recently admitted to the hospital.  Patient received multiple I&D, debridement in the past.  Ulcer has been getting worse, foul-smelling.  Patient denies any fever, chills, nausea, vomiting, denied any chest pain, shortness of breath, denies any abdominal pain, denies any constipation, denied any urinary complaints.  Vitals on arrival-/93, HR 88, RR 19, temp 98.2, saturating 98% on room air.  Labs significant for BUN 53, creatinine 7.1, anion gap 17, random glucose 214, lactic acid 1.9, LFTs within normal range, WBC 8.8, hemoglobin 7, platelets 196.  Blood cultures drawn in ED.  X-ray of the left foot done in ED.  General surgery consulted from ED.  Patient received vancomycin, meropenem.    # MDR Proteus Diabetic foot infection  -Referred from wound clinic  -General surgery consulted from ED  -X-ray of the left foot-progressive extensive osteolysis with pathological fracture and deformity at the fifth metatarsal  Allergy ANXIETY and PRURITUS     Patient states she's allergic to the capsules    • Compazine ANXIETY   • Demerol Other (See Comments)     Anxiety panic attack   • Doxycycline HEADACHES and NAUSEA   • Lovenox SWELLING and MYALGIA     Tongue swelling, mild per pt, joint and muscle pain  (tolerates heparin)   • Morphine Other (See Comments)     headache   • Vicodin [Hydrocodone-Acetaminophen] PRURITUS     Tolerates percocet.    • Zofran ANXIETY       Current Discharge Medication List      Prior to Admission Medications    Details   metoPROLOL tartrate (LOPRESSOR) 100 MG tablet Take 1 tablet by mouth 2 times daily.  Qty: 60 tablet, Refills: 2      warfarin (COUMADIN) 2 MG tablet Take 5 mg nightly or as directed by anticoagulation clinic  Qty: 75 tablet, Refills: 1      gabapentin (NEURONTIN) 600 MG tablet Take 1 tablet by mouth nightly.  Qty: 30 tablet, Refills: 1      medroxyPROGESTERone (PROVERA) 10 MG tablet Take one pill per day for 14 days of the month  Qty: 14 tablet, Refills: 12      Valacyclovir HCl 1000 MG Tab Take 1 tablet by mouth daily.  Qty: 90 tablet, Refills: 2      cyclobenzaprine (FLEXERIL) 5 MG tablet Take 2 tablets by mouth at bedtime.  Qty: 90 tablet, Refills: 0      pantoprazole (PROTONIX) 40 MG tablet Take 1 tablet by mouth daily.  Qty: 90 tablet, Refills: 0      ferrous sulfate 325 (65 FE) MG tablet Take 1 tablet by mouth daily (with breakfast).  Qty: 90 tablet, Refills: 3      cetirizine (ZYRTEC) 10 MG tablet Take 1 tablet by mouth daily as needed for Allergies.  Qty: 15 tablet, Refills: 0      acetaminophen (TYLENOL) 500 MG tablet Take 500 mg by mouth every 6 hours as needed for Pain.      polyethylene glycol (MIRALAX) powder Take 17 g by mouth daily.  Qty: 510 g, Refills: 0      vitamin - therapeutic multivitamins w/minerals (CENTRUM SILVER,THERA-M) Tab Take 1 tablet by mouth daily.  Qty: 30 tablet, Refills: 0             Past Medical History:   Diagnosis Date   • Alcohol abuse 1/12/2018   •  Anemia 2016    On iron and folic acid. several blood transfusions   • Anxiety    • Arthritis     in [facility] knees and hands, and back   • Atrial fibrillation (CMS/HCC)    • Bipolar 1 disorder, mixed (CMS/HCC) 10/27/2015    pt denies   • Bronchitis    • Chronic back pain    • Chronic kidney disease    • Chronic pain     back, knees   • Depression    • Diabetes mellitus, type 2 (CMS/HCC)    • Diabetic peripheral neuropathy (CMS/HCC)    • Ectopic pregnancy 2015   • Essential (primary) hypertension    • Failed moderate sedation during procedure     Reaction to anesthetic   • Gastroesophageal reflux disease    • H/O: CVA (cerebrovascular accident) 10/06/2016    In 2014. Embolic from atrial fibrillation and PFO.    • History of herpes genitalis    • History of TTP (thrombotic thrombocytopenic purpura)     possibly  see Hematology note    • Hydradenitis    • Hyperlipidemia 2018   • Irregular menstrual cycle 2017   • Leukocytosis     chronic   • Liver disease    • PFO (patent foramen ovale)    • Pneumonia    • Sleep apnea    • Stroke (CMS/HCC)     x4. Last 2014   • Urinary incontinence 2018   • Vitamin D deficiency        Past Surgical History:   Procedure Laterality Date   • ABDOMEN SURGERY     • BREAST BIOPSY Right 10/12/2015    I&D, debridement related to hydradenitis   • BREAST SURGERY      I&D breast abscess x 3   • ECHO M-MODE/2D/DOPPLER (ROUTINE)  2017    [facility] EF 68% Agitated saline was injected through a peripheral vein and did show evidence of a medium sized shunt.Normal left ventricular size, systolic function and wall thickness, with no regional wall motion abnormalities.No change since the prior study in    • SALPINGECTOMY Right 2015    ectopic pregnancy, right sided, 3 L of hemoperitoneum   • SKIN BIOPSY     • SKIN EXCISION  2019    hydradenitis   • THERAPEUTIC       x2   • US CAROTID DUPLEX BILATERAL Bilateral 2017    [facility] Stable right  carotid moderate plaque with velocity profile remaining <50% stenosis category.Progressive right internal carotid artery disease on basis of increasing velocity to a current maximal of 182 cm/s.Although velocities are generally increased on left, current peak velocity places disease in 50-69% stenosis category.   • US LOWER EXTREMITY VENOUS DUPLEX BILATERAL Bilateral 05/01/2017    [facility] No evidence of DVT within bilateral lower extremities from the level of the common femoral veins to the deep calf veins.   • US LOWER EXTREMITY VENOUS DUPLEX BILATERAL Bilateral 05/17/2017    [facility] Normal sonography of the bilateral femoral popliteal venous systems. Normal flow detected in the calf veins.       Family History   Problem Relation Age of Onset   • Stroke Mother    • Obesity Mother         gastric bypass   • Hypertension Mother    • Arthritis Mother    • Heart disease Maternal Grandmother    • Diabetes Maternal Grandmother    • Diabetes Brother    • Cancer, Lung Maternal Grandfather         asbestos related   • Diabetes Maternal Aunt         x2   • Diabetes Maternal Uncle         x2       Social History     Tobacco Use   • Smoking status: Current Some Day Smoker     Packs/day: 0.25     Years: 12.00     Pack years: 3.00     Types: Cigarettes   • Smokeless tobacco: Never Used   Substance Use Topics   • Alcohol use: Yes     Alcohol/week: 4.0 standard drinks     Types: 4 Standard drinks or equivalent per week     Frequency: Never     Drinks per session: 1 or 2     Binge frequency: Never     Comment: 2 pints a day    • Drug use: Not Currently     Types: Cocaine     Comment: hx of cocaine 2013       Review of Systems   Constitutional: Negative for activity change, chills, fatigue and fever.   HENT: Negative for congestion, rhinorrhea and sore throat.    Eyes: Negative for pain and visual disturbance.   Respiratory: Positive for chest tightness and shortness of breath. Negative for cough.    Cardiovascular: Negative for chest  pain and palpitations.   Gastrointestinal: Negative for abdominal pain, diarrhea and vomiting.   Genitourinary: Negative for difficulty urinating, dysuria and frequency.   Musculoskeletal: Negative for arthralgias and myalgias.   Skin: Negative for color change and rash.   Neurological: Positive for numbness (residual right sided numbness after prior stroke) and headaches. Negative for dizziness and weakness.   Hematological: Bruises/bleeds easily (on coumadin).   Psychiatric/Behavioral: Positive for sleep disturbance. The patient is nervous/anxious.        Physical Exam     ED Triage Vitals [06/12/20 0555]   ED Triage Vitals Group      Temp 99 °F (37.2 °C)      Heart Rate 81      Resp 20      BP (!) 202/102      SpO2 100 %      EtCO2 mmHg       Height       Weight 279 lb 1.6 oz (126.6 kg)      Weight Scale Used ED Actual      BMI (Calculated)       IBW/kg (Calculated)        Physical Exam   Constitutional: She is oriented to person, place, and time. She appears well-developed and well-nourished. She appears distressed (moderately due to symptoms).   HENT:   Head: Normocephalic and atraumatic.   Right Ear: External ear normal.   Left Ear: External ear normal.   Mouth/Throat: Oropharynx is clear and moist and mucous membranes are normal.   Eyes: Pupils are equal, round, and reactive to light. Conjunctivae and EOM are normal. No scleral icterus.   Neck: Normal range of motion. Neck supple. No JVD present.   Cardiovascular: Normal rate, regular rhythm, normal heart sounds and intact distal pulses. Exam reveals no gallop and no friction rub.   No murmur heard.  Pulmonary/Chest: Effort normal and breath sounds normal. No respiratory distress. She has no wheezes. She has no rales.   Abdominal: Soft. Bowel sounds are normal. She exhibits no distension. There is no abdominal tenderness.   Musculoskeletal: Normal range of motion.         General: No tenderness or edema.   Lymphadenopathy:     She has no cervical adenopathy.    Neurological: She is alert and oriented to person, place, and time. She has normal strength. No sensory deficit. She exhibits normal muscle tone. Coordination normal.   No finger to nose dysmetria.  No pronator drift.  Normal strength throughout all extremities.   No aphasia, no dysarthria.   Sensation slightly decreased right relative to left - chronic per patient.   Skin: Skin is warm and dry. No rash noted. No erythema. No pallor.   Psychiatric: Her behavior is normal. Her mood appears anxious. Her speech is rapid and/or pressured.   Nursing note and vitals reviewed.      ED Course     Procedures    Lab Results     Pending.    EKG   EKG Interpretation  Rate: 73  Rhythm: normal sinus rhythm   Abnormality: no  No significant change compared to 6/9/2020    EKG tracing interpreted by ED physician    Radiology Results     Imaging Results    CT head pending         ED Medication Orders (From admission, onward)    Ordered Start     Status Ordering Provider    06/12/20 0609 06/12/20 0610  dexamethasone (PF) (DECADRON) injection 10 mg  ONCE      Last MAR action:  Given GISELA ÁLVAREZ               [facility]  ED course/MDM  Initial visit: Pt with extensive PMH including CAD, afib, CVA, bipolar disorder, anxiety presenting with HA, vomiting, SOB, chest tightness. Onset a few days ago. Seen three times at [place] for similar symptoms. Daily drinker, but no alcohol consumption in the past day, likely contributing to her symptoms. No trauma to the had, but pt on coumadin with elevated BP. Neuro exam was at baseline in the ED. Discussed plan for CT head, CXR, EKG, screening labs to further evaluate. Will also order medication for symptoms (limited options due to pt's med allergies; plan to prescribe decadron to help with HA pain and nausea). Pt was agreeable with the plan.    Care of the patient was transferred to Dr. Jimenez for follow up on results of ED evaluation and pt's response to medications.    Critical Care time  spent on this patient outside of billable procedures:  none.    ED Diagnoses        Final diagnoses    Hypertension, unspecified type          Severe headache          Nausea and vomiting, intractability of vomiting not specified, unspecified vomiting type          Anxiety                   Laura Gurrola MD  06/12/20 1942

## 2024-03-20 NOTE — CONSULTS
[facility] Contin[facility] Nurse  Consult Note       Yovanny Levine  AGE: 48 y.o.   GENDER: male  : 1975  TODAY'S DATE:  3/20/2024    Subjective:     Reason for  Evaluation and Assessment: NPWT dressing change to left foot.       Yovanny Levine is a 48 y.o. male referred by:   [x] Physician  [] Nursing  [] Other:     Wound Identification:  Wound Type: diabetic and non-healing surgical  Contributing Factors: diabetes and chronic pressure        PAST MEDICAL HISTORY        Diagnosis Date    Abscess 2010    scrotal    Acute renal failure (ARF) ([facility]) 2019    Anxiety associated with depression     Anxiety associated with depression     Back pain 2012    CAD (coronary artery disease) 02/10/2023    Chest pain 2013    Diabetic nephropathy ([facility])     Diabetic neuropathy ([facility]) 2011    Diabetic ulcer of left midfoot associated with type 2 diabetes mellitus, with fat layer exposed ([facility]) 2017    Diverticulosis     C scope + Dr. Medina    DM (diabetes mellitus), type 2 ([facility])     DR. 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 Medina    Nausea and vomiting 2019    Necrotizing fasciitis ([facility])     Nose fracture 1988    Panic attacks     Pericarditis     Hospitalized with s/p heart cath normal    Peripheral autonomic neuropathy due to diabetes mellitus ([facility])     Axonal EMG- NCS, 2011    Sebaceous cyst 2011    URI (upper respiratory infection) 2012    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 type 2 diabetes mellitus, with muscle involvement without evidence of necrosis ([facility]) 2021    WD-Wound, surgical, infected, initial encounter 2017    Wrist fracture 1986       PAST SURGICAL HISTORY    Past Surgical History:

## 2024-03-20 NOTE — PROGRESS NOTES
Nephrology Progress Note        [address], [address] [place] [address]  Phone: [phone]  Office Hours: 8:30AM - 4:30PM  Monday - Friday        3/20/2024 9:28 AM  Subjective:   Admit Date: 3/12/2024  PCP: José Luis Izuqierdo MD  Interval History:     Diet: ADULT DIET; Regular; 4 carb choices (60 gm/meal); 1500 ml      Data:   Scheduled Meds:   vancomycin  1,000 mg IntraVENous Once    epoetin paola-epbx  14,000 Units IntraVENous Once per day on Mon Wed Fri    vancomycin (VANCOCIN) intermittent dosing (placeholder)   Other RX Placeholder    calcitRIOL  0.5 mcg Oral BID    Vitamin D  5,000 Units Oral Daily    sodium chloride flush  5-40 mL IntraVENous BID    [Held by provider] heparin (porcine)  5,000 Units SubCUTAneous 3 times per day    insulin lispro  0-8 Units SubCUTAneous TID WC    insulin lispro  0-4 Units SubCUTAneous Nightly    amLODIPine  5 mg Oral Daily    ARIPiprazole  5 mg Oral Nightly    atorvastatin  40 mg Oral Nightly    calcium acetate  1 capsule Oral TID WC    carvedilol  25 mg Oral BID WC    hydrALAZINE  25 mg Oral 3 times per day    insulin glargine  10 Units SubCUTAneous Nightly    meropenem  500 mg IntraVENous Q24H     Continuous Infusions:   sodium chloride 250 mL/hr at 03/19/24 2048    dextrose       PRN Meds:calcium carbonate, vashe wound therapy, hydrOXYzine HCl, loperamide, guaiFENesin, albuterol, sodium chloride flush, sodium chloride, ondansetron **OR** ondansetron, polyethylene glycol, acetaminophen **OR** acetaminophen, glucose, dextrose bolus **OR** dextrose bolus, glucagon (rDNA), dextrose, oxyCODONE-acetaminophen  I/O last 3 completed shifts:  In: 240 [P.O.:240]  Out: -   No intake/output data recorded.    Intake/Output Summary (Last 24 hours) at 3/20/2024 0928  Last data filed at 3/19/2024 1930  Gross per 24 hour   Intake 240 ml   Output --   Net 240 ml       CBC:   Recent Labs     03/18/24  0617 03/18/24  0910 03/19/24  0323 03/20/24  0235   WBC 5.3  --  5.9 6.0

## 2024-03-20 NOTE — PROGRESS NOTES
PHARMACY VANCOMYCIN MONITORING SERVICE  Pharmacy consulted by Dr. Stuart for monitoring and adjustment.    Indication for treatment: Vancomycin indication: Bone/Joint infection   Goal trough: Trough Goal: 15-20 mcg/mL  AUC/PAMELA: 400-600    Risk Factors for MRSA Identified:   Hospitalization within the past 90 days, Received IV antibiotics within the past 90 days, Patient on hemodialysis, Purulent and/or complicated SSTI    Pertinent Laboratory Values:   Temp Readings from Last 3 Encounters:   03/20/24 97.8 °F (36.6 °C) (Oral)   03/12/24 98.7 °F (37.1 °C) (Temporal)   03/05/24 98.2 °F (36.8 °C) (Temporal)     Recent Labs     03/18/24  0617 03/19/24  0323 03/20/24  0235   WBC 5.3 5.9 6.0     Recent Labs     03/18/24  0617 03/19/24  0323 03/20/24  0235   BUN 90* 63* 75*   CREATININE 10.9* 8.1* 9.2*     Estimated Creatinine Clearance: 12 mL/min (A) (based on SCr of 9.2 mg/dL (H)).    Intake/Output Summary (Last 24 hours) at 3/20/2024 0916  Last data filed at 3/19/2024 1930  Gross per 24 hour   Intake 240 ml   Output --   Net 240 ml     Last Encounter Weight:  Wt Readings from Last 3 Encounters:   03/12/24 115.7 kg (255 lb)   02/12/24 114.4 kg (252 lb 3.2 oz)   01/10/24 114.3 kg (252 lb)     Pertinent Cultures:   Date    Source    Results  03/12/24  Blood    NGTD x72h  03/14/24  Respiratory Panel  RSV  3/18                            Foot wound Cx                        Proteus vulgaris    VANCOMYCIN TROUGH:  No results for input(s): \"VANCOTROUGH\" in the last 72 hours.  VANCOMYCIN RANDOM:    Recent Labs     03/18/24  0617 03/20/24  0235   VANCORANDOM 16.6 22.5     Assessment:  HPI: 48 y.o. male with history of CAD, HTN, DM type II, HLD, and diabetic foot ulcer. Patient presented to ED with an open wound on his left foot. X-ray of left foot shows progressive extensive osteomyelitis.  3/13 - Patient declines amputation. Local wound and antibiotics to continue. ID consulted.  3/14 - ID notes to continue vancomycin.  3/18

## 2024-03-20 NOTE — PROGRESS NOTES
V2.0  [facility] [place]      Name:  Yovanny Levine /Age/Sex: 1975  (48 y.o. male)   MRN & CSN:  [id number] & [id number] Encounter Date/Time: 3/20/2024 11:59 AM EDT    [facility]  [facility] PCP: José Luis Izquierdo MD       Hospital Day: 9    Assessment and Plan:   Yovanny Levine is a 48 y.o. male with pmh of  coronary artery disease, hypertension, diabetes mellitus type 2, on long-term insulin, diabetic foot ulcer, hyperlipidemia, depression, chronic pain   who presents with Diabetic foot infection (HCC)      Plan:  # MDR Proteus Diabetic foot infection  -Referred from wound clinic  -General surgery consulted from ED  -X-ray of the left foot-progressive extensive osteolysis with pathological fracture and deformity at the fifth metatarsal head and neck and fifth digit proximal phalanx compatible with osteomyelitis.  Adjacent large soft tissue ulceration, soft tissue edema and air.  -Patient refuses any amputation, agreeable to wound VAC plan to continue local wound care and antibiotics  -Continue broad-spectrum antibiotics- vancomycin, meropenem. Final wound cultures pending. Final abx recommendations pending from ID. Possible switch to PO ciprofloxacin today   - Surgery recommending amputation - patient refusing.   - Has wound vac     #. RSV  - +ve for RSV  - Albuterol PRN        #.  Recent admission 2024 for sepsis secondary to left foot cellulitis  -Left foot MRI confirmed osteomyelitis-patient underwent incision and drainage, debridement of the left foot     #.  ESRD on HD  -Nephrology on board -Dr Parmar/Dr James  -Patient is on calcium acetate     #.  PUD  -EGD-2023-1 nonbleeding gastric ulcer noted in the gastric fundus.      #.  Coronary artery disease  -History of PCI and stenting to LAD, left circumflex  -LHC-2022-patent stents  -Patient not taking aspirin   -On atorvastatin, carvedilol-continue     #.  Chronic normocytic anemia  -Likely secondary to end-stage renal disease, No  anaerobes isolated so far, Further report to follow    Narrative:      SETUP DATE/TIME:  03/17/2024 0756    Respiratory Panel, Molecular, with COVID-19 (Restricted: peds pts or suitable admitted adults) [[id number]]  (Abnormal) Collected: 03/14/24 2335    Order Status: Completed Specimen: Nasopharyngeal Updated: 03/15/24 0118     Adenovirus Detection by PCR NOT DETECTED     Coronavirus 229E PCR NOT DETECTED     Coronavirus HKU1 PCR NOT DETECTED     Coronavirus NL63 PCR NOT DETECTED     Coronavirus OC43 PCR NOT DETECTED     SARS-CoV-2 NOT DETECTED     Comment:         Fact sheet for Healthcare Providers: https://www.fda.gov/media/525330/download  Fact sheet for Patients: https://www.fda.gov/media/303264/download          METHODOLOGY: Multiplex PCR, LJ NON-PROBE Detection          Human Metapneumovirus PCR NOT DETECTED     Rhinovirus Enterovirus PCR NOT DETECTED     Influenza A by PCR NOT DETECTED     Influenza A H1 Pandemic PCR NOT DETECTED     Influenza A H1 (2009) PCR NOT DETECTED     Influenza A H3 PCR NOT DETECTED     Influenza B by PCR NOT DETECTED     Parainfluenza 1 PCR NOT DETECTED     Parainfluenza 2 PCR NOT DETECTED     Parainfluenza 3 PCR NOT DETECTED     Parainfluenza 4 PCR NOT DETECTED     RSV PCR DETECTED BY PCR     Bordetella parapertussis by PCR NOT DETECTED     B Pertussis by PCR NOT DETECTED     Chlamydophila Pneumonia PCR NOT DETECTED     Mycoplasma pneumo by PCR NOT DETECTED    Blood Culture 1 [[id number]] Collected: 03/12/24 1905    Order Status: Completed Specimen: Blood Updated: 03/17/24 0752     Specimen BLOOD     Special Requests NONE     Culture NO GROWTH AT 5 DAYS    Narrative:      SETUP DATE/TIME:  03/12/2024 2106    Blood Culture 2 [[id number]] Collected: 03/12/24 1850    Order Status: Completed Specimen: Blood Updated: 03/17/24 0752     Specimen BLOOD     Special Requests NONE     Culture NO GROWTH AT 5 DAYS    Narrative:      SETUP DATE/TIME:  03/12/2024 2106

## 2024-03-20 NOTE — PROGRESS NOTES
Patient Name: Yovanny Levine  Patient : 1975  MRN: [id number]     Acct: [id number]  Date of Admission: 3/12/2024  Room/Bed: 1118/1118-A  Code Status:  Full Code  Allergies:   Allergies   Allergen Reactions    Adhesive Tape Rash    Doxycycline Nausea And Vomiting    Reglan [Metoclopramide] Anxiety     Diagnosis:    Patient Active Problem List   Diagnosis    Hiatal hernia    Diabetes mellitus type 2, improved controlled    Diabetic neuropathy (HCC)    Panic attack    Anxiety associated with depression    Neck pain    DANIELA (obstructive sleep apnea)    Urinary frequency    Bilateral carpal tunnel syndrome- left worse than right    Hyperlipidemia with target LDL less than 100    Essential hypertension    Bronchitis    Osteomyelitis (HCC)    Abscess    Periumbilical hernia    Sepsis (HCC)    Cellulitis of left foot    Constipation    Intractable nausea and vomiting    Uncontrolled type 2 diabetes mellitus    Nausea and vomiting    Diabetic foot infection (HCC)    Acute renal failure (ARF) (HCC)    Cellulitis    Cellulitis of left arm    Cellulitis, scrotum    Acute epididymitis    Irene gangrene    Recurrent major depressive disorder, in full remission (HCC)    Generalized anxiety disorder    Morbid obesity with body mass index (BMI) of 40.0 to 44.9 in adult (HCC)    Necrotizing fasciitis (HCC)    Syncope    Right groin wound    Ulcer of right groin with fat layer exposed (HCC)    Diabetic ulcer of left midfoot associated with type 2 diabetes mellitus, with necrosis of muscle (HCC)    Nephrotic syndrome    Generalized edema    Stage 3b chronic kidney disease (HCC)    Diabetic nephropathy associated with type 2 diabetes mellitus (HCC)    Hypoalbuminemia    Chronic kidney disease, stage IV (severe) (HCC)    FH: CAD (coronary artery disease)    ASCVD (arteriosclerotic cardiovascular disease)    Other proteinuria    Chest pain    Surgical wound dehiscence    CARMEN (acute kidney injury) (HCC)    Anemia    Chest  ml/hr 2466 ml -40 mmHg 80 80 700 no complaints Yes   03/20/24 1530 350 ml/min 1170 ml/hr 2864 ml -50 mmHg 90 80 700 resting with eyes closed, no distress noted Yes   03/20/24 1545 350 ml/min 1170 ml/hr 3096 ml -50 mmHg 90 80 700 snoring Yes   03/20/24 1604 -- -- 3500 ml -- -- -- -- tx ended Yes     Vital Signs  Patient Vitals for the past 8 hrs:   BP Temp Pulse Resp SpO2   03/20/24 0938 (!) 142/90 98 °F (36.7 °C) 61 18 --   03/20/24 1253 -- -- -- -- 98 %   03/20/24 1255 114/75 98 °F (36.7 °C) 59 18 98 %   03/20/24 1302 109/71 -- 58 -- --   03/20/24 1315 121/68 -- 60 -- --   03/20/24 1330 119/73 -- 60 -- --   03/20/24 1345 (!) 127/94 -- 60 -- --   03/20/24 1400 115/70 -- 60 -- --   03/20/24 1415 130/66 -- 61 -- --   03/20/24 1430 125/73 -- 60 -- --   03/20/24 1445 120/76 -- 60 -- --   03/20/24 1500 131/65 -- 61 -- --   03/20/24 1502 131/65 -- 61 -- --   03/20/24 1515 117/64 -- 60 -- --   03/20/24 1530 116/77 -- 60 -- --   03/20/24 1545 110/71 -- 60 -- --   03/20/24 1604 122/74 -- 62 -- --   03/20/24 1609 129/85 98.1 °F (36.7 °C) 62 16 98 %     Post-Dialysis  Arterial Catheter Locking Solution: Not Applicable  Venous Catheter Locking Solution: Not Applicable  Post-Treatment Procedures: Blood returned, Access bleeding time < 10 minutes  Machine Disinfection Process: Acid/Vinegar Clean, Heat Disinfect, Exterior Machine Disinfection  Rinseback Volume (ml): 300 ml  Blood Volume Processed (Liters): 59.5 L  Dialyzer Clearance: Heavily streaked  Duration of Treatment (minutes): 180 minutes     Hemodialysis Intake (ml): 500 ml  Hemodialysis Output (ml): 3500 ml     Tolerated Treatment: Good  Patient Response to Treatment: good          Provider Notification        Handoff complete and report given to Primary RN at 1610 hours.  Primary RN (First Initial, Last Name, Title):  JEFF Holland RN     Education  Person Educated: Patient   Knowledge Base: Substantial  Barriers to Learning?: None  Preferred method of Learning:

## 2024-03-20 NOTE — DISCHARGE INSTRUCTIONS
Internal Medicine Discharge Instruction    Discharge to:  Home  Diet: ADULT DIET; Regular; 4 carb choices (60 gm/meal); 1500 ml  Activity: As tolerated       Be compliant with medications  Please take medications as prescribed   Please follow up with wound clinic         Electronically signed by Dean Mcclendon MD on 3/20/2024 at 4:43 PM

## 2024-03-22 LAB
CULTURE: ABNORMAL
CULTURE: ABNORMAL
CULTURE: NORMAL
Lab: ABNORMAL
Lab: NORMAL
SPECIMEN: ABNORMAL
SPECIMEN: NORMAL

## 2024-03-26 ENCOUNTER — HOSPITAL ENCOUNTER (OUTPATIENT)
Dept: WOUND CARE | Age: 49
Discharge: HOME | End: 2024-03-26
Attending: STUDENT IN AN ORGANIZED HEALTH CARE EDUCATION/TRAINING PROGRAM
Payer: MEDICARE

## 2024-03-26 VITALS
TEMPERATURE: 98.7 F | SYSTOLIC BLOOD PRESSURE: 141 MMHG | RESPIRATION RATE: 18 BRPM | HEART RATE: 82 BPM | DIASTOLIC BLOOD PRESSURE: 82 MMHG

## 2024-03-26 PROCEDURE — 11043 DBRDMT MUSC&/FSCA 1ST 20/<: CPT

## 2024-03-26 PROCEDURE — 11042 DBRDMT SUBQ TIS 1ST 20SQCM/<: CPT

## 2024-03-26 PROCEDURE — 97605 NEG PRS WND THER DME<=50SQCM: CPT

## 2024-03-26 ASSESSMENT — PAIN SCALES - WONG BAKER: WONGBAKER_NUMERICALRESPONSE: HURTS EVEN MORE

## 2024-03-26 ASSESSMENT — PAIN SCALES - GENERAL: PAINLEVEL_OUTOF10: 0

## 2024-03-26 NOTE — PROGRESS NOTES
Wound Care Center Progress Note With Procedure    Yovanny Levine  AGE: 48 y.o.   GENDER: male  : 1975  EPISODE DATE:  3/26/2024     Subjective:     Chief Complaint   Patient presents with    Wound Check     Left foot          HISTORY of PRESENT ILLNESS      Yovanny Levine is a 48 y.o. male who presents today for wound evaluation of chronic wound to the bottom of the left foot.  Patient was admitted to the hospital since the last visit.  When he was there he was put on antibiotics.  He is currently on p.o. antibiotics.  Says that surgery was recommended with concern for residual bone infection but he refused surgery.  Denies any pain or constitutional symptoms today.    Denies any calf pain chest pain shortness of breath difficulty breathing.  Denies any constitutional symptoms.    PAST MEDICAL HISTORY        Diagnosis Date    Abscess     scrotal    Acute renal failure (ARF) ([facility]) 2019    Anxiety associated with depression     Anxiety associated with depression     Back pain 2012    CAD (coronary artery disease) 02/10/2023    Chest pain 2013    Diabetic nephropathy ([facility])     Diabetic neuropathy ([facility]) 2011    Diabetic ulcer of left midfoot associated with type 2 diabetes mellitus, with fat layer exposed ([facility]) 2017    Diverticulosis     C scope + Dr. Medina    DM (diabetes mellitus), type 2 ([facility])     DR. 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 Medina    Nausea and vomiting 2019    Necrotizing fasciitis ([facility])     Nose fracture     Panic attacks     Pericarditis     Hospitalized with s/p heart cath normal    Peripheral autonomic neuropathy due to diabetes mellitus ([facility])     Axonal EMG- [facility], 2011    Sebaceous cyst 2011    URI

## 2024-03-26 NOTE — PATIENT INSTRUCTIONS
PHYSICIAN ORDERS AND DISCHARGE INSTRUCTIONS     Wound cleansing:              Do not scrub or use excessive force.             Wash hands with soap and water before and after dressing changes.             Prior to applying a clean dressing, cleanse wound with normal saline,               wound cleanser, or mild soap and water.              Ask the physician or nurse before getting the wound(s) wet in a shower                         Grafts:             Wound Care Notes:  Rx:    Cultures:    Sees Dr Kumar.                                         Orders for this week: 3/26/2024     Fax to [facility]!     Left plantar wound - Wash with soap and water, pat dry.   Paint with betadine  Cover with ABD   Wrap with conform   HHC to Change twice a week until vac is available      Left Lateral plantar wounds: WOUND VAC THERAPY:  SKIN PREP OR MASTISOL, THEN DUODERM TO PERIWOUND FOR PROTECTION.   APPLY SANTYL and BLACK FOAM TO WOUND. SECURE VAC DRESSING WITH DRAPE.  SET WOUND VAC  CONTINUOUS SUCTION.   CANISTER CHANGE WEEKLY OR ACCORDING TO VOLUME OF DRAINAGE.  WOUND VAC DRESSING TO BE CHANGED BY HOME CARE ON  AND SATURDAY (OR ).  WOUND CARE CENTER WILL CHANGE ON  (NEED TO BRING VAC SUPPLIES TO APPOINTMENTS - CANISTER AND BLACK FOAM SUCTION KIT).     Plan: Report to ER for IV antibiotics and debridement      Follow Up Instructions: At the Wound Care Center in 1 week  Primary Wound Care Provider: Dr Webber  Call  for any questions or concerns.  Central [phone] for imaging lab work

## 2024-03-26 NOTE — PROGRESS NOTES
Nonselective enzymatic debridement performed with Santyl per physician order to wound amp site.   Patient tolerated the procedure well.       Negative Pressure Wound Therapy    NAME:  Yovanny Levine  YOB: 1975  MEDICAL RECORD NUMBER:  [id number]  DATE:  3/26/2024    Applied Negative Pressure to amp site.  [x] Applied skin barrier prep to mao-wound.   [x] Cut strips of plastic drape to picture frame wound so that mao-wound is     covered with the drape.   [x] If bridging dressing to less prominent site, cover any intact skin that will come in contact with the Negative Pressure Therapy sponge, gauze or channel drain with plastic drape. The sponge should never touch intact skin.   [x] Cut sponge, gauze or channel drain to size which will fit into the wound/ulcer bed without being forced.   [x] Be sure the sponge is large enough to hold the entire round plastic flange which is attached to the tubing. Never allow flange to be larger than the sponge or it will produce suction damaging intact skin.  Total number of individual pieces of foam used within the wound bed: 1    [x] If bridging the dressing away from the primary site, be sure the bridge leads to a piece of sponge large enough to hold the entire flange without allowing any of the flange to overlap onto intact skin.   [x] Covered sponge, gauze or channel drain with plastic drape.   [x] Cut a hole in this plastic drape directly over the sponge the same size as the plastic drain tubing.   [x] Removed plastic liner from flange and apply it directly over the hole you cut.   [x] Removed the plastic cover from the flange.   [x] Attached the tubing to the wound/ulcer Negative Pressure Therapy and turn it on to be sure a vacuum is created and that there are no leaks.   [x] If air leaks occur, use plastic drape to patch them.   [x] Secured Negative Pressure Therapy dressing with ace wrap loosely if located on an extremity. Maintain tubing outside of ace

## 2024-04-02 ENCOUNTER — HOSPITAL ENCOUNTER (OUTPATIENT)
Dept: WOUND CARE | Age: 49
Discharge: HOME | End: 2024-04-02
Attending: STUDENT IN AN ORGANIZED HEALTH CARE EDUCATION/TRAINING PROGRAM
Payer: MEDICARE

## 2024-04-02 VITALS
RESPIRATION RATE: 18 BRPM | SYSTOLIC BLOOD PRESSURE: 143 MMHG | HEART RATE: 98 BPM | DIASTOLIC BLOOD PRESSURE: 77 MMHG | TEMPERATURE: 98.4 F

## 2024-04-02 PROCEDURE — 11043 DBRDMT MUSC&/FSCA 1ST 20/<: CPT

## 2024-04-02 PROCEDURE — 97605 NEG PRS WND THER DME<=50SQCM: CPT

## 2024-04-02 ASSESSMENT — PAIN DESCRIPTION - LOCATION: LOCATION: FOOT

## 2024-04-02 ASSESSMENT — PAIN DESCRIPTION - FREQUENCY: FREQUENCY: CONTINUOUS

## 2024-04-02 ASSESSMENT — PAIN SCALES - GENERAL: PAINLEVEL_OUTOF10: 3

## 2024-04-02 ASSESSMENT — PAIN DESCRIPTION - ORIENTATION: ORIENTATION: LEFT

## 2024-04-02 ASSESSMENT — PAIN DESCRIPTION - ONSET: ONSET: ON-GOING

## 2024-04-02 ASSESSMENT — PAIN DESCRIPTION - DESCRIPTORS: DESCRIPTORS: ACHING

## 2024-04-02 ASSESSMENT — PAIN DESCRIPTION - PAIN TYPE: TYPE: CHRONIC PAIN

## 2024-04-02 NOTE — PROGRESS NOTES
Nonselective enzymatic debridement performed with Santyl per physician order to wound(s) of the LEFT FOOT.  Patient tolerated the procedure well.   
DRAPE BRIDGE TO TOP OF FOOT DRAPE VAC)  Wound 02/20/24 Wound #2 Left plantar-Dressing/Treatment:  (PAINTED WITH BETADINE ABD CONFORM)    Written Patient Dismissal Instructions Given     -Patient was seen, evaluated, treated today  -Continue with antibiotics per infectious disease  -Clinically there is no signs of acute infection today  -Wound VAC was reapplied today.  Home health care to change  -Any concerns before the next visit call the office or go to emergency room  -Follow-up 1 week for recheck      Electronically signed by Andrzej Webber DPM on 4/2/2024 at 3:53 PM

## 2024-04-02 NOTE — PATIENT INSTRUCTIONS
PHYSICIAN ORDERS AND DISCHARGE INSTRUCTIONS     Wound cleansing:              Do not scrub or use excessive force.             Wash hands with soap and water before and after dressing changes.             Prior to applying a clean dressing, cleanse wound with normal saline,               wound cleanser, or mild soap and water.              Ask the physician or nurse before getting the wound(s) wet in a shower                         Grafts:             Wound Care Notes:  Rx:    Cultures:    Sees Dr Kumar.                                         Orders for this week: 2024     Fax to [facility]!      Left Lateral plantar wound: WOUND VAC THERAPY:  SKIN PREP OR MASTISOL, THEN DUODERM TO PERIWOUND FOR PROTECTION.   APPLY SANTYL and BLACK FOAM TO WOUND. SECURE VAC DRESSING WITH DRAPE.  SET WOUND VAC  CONTINUOUS SUCTION.   CANISTER CHANGE WEEKLY OR ACCORDING TO VOLUME OF DRAINAGE.  WOUND VAC DRESSING TO BE CHANGED BY HOME CARE ON  AND SATURDAY (OR ).  WOUND CARE CENTER WILL CHANGE ON  (NEED TO BRING VAC SUPPLIES TO APPOINTMENTS - CANISTER AND BLACK FOAM SUCTION KIT).        Follow Up Instructions: At the Wound Care Center in 1 week  Primary Wound Care Provider: Dr Webber  Call  for any questions or concerns.  Central [phone] for imaging lab work

## 2024-04-08 ENCOUNTER — HOSPITAL ENCOUNTER (INPATIENT)
Age: 49
LOS: 3 days | Discharge: HOME | DRG: 640 | End: 2024-04-11
Attending: STUDENT IN AN ORGANIZED HEALTH CARE EDUCATION/TRAINING PROGRAM | Admitting: INTERNAL MEDICINE
Payer: MEDICARE

## 2024-04-08 ENCOUNTER — APPOINTMENT (OUTPATIENT)
Dept: GENERAL RADIOLOGY | Age: 49
DRG: 640 | End: 2024-04-08
Payer: MEDICARE

## 2024-04-08 DIAGNOSIS — L97.422 DIABETIC ULCER OF LEFT MIDFOOT ASSOCIATED WITH DIABETES MELLITUS DUE TO UNDERLYING CONDITION, WITH FAT LAYER EXPOSED (HCC): ICD-10-CM

## 2024-04-08 DIAGNOSIS — E87.70 HYPERVOLEMIA, UNSPECIFIED HYPERVOLEMIA TYPE: ICD-10-CM

## 2024-04-08 DIAGNOSIS — E83.51 HYPOCALCEMIA: ICD-10-CM

## 2024-04-08 DIAGNOSIS — D63.1 ANEMIA DUE TO STAGE 3 CHRONIC KIDNEY DISEASE, UNSPECIFIED WHETHER STAGE 3A OR 3B CKD (HCC): ICD-10-CM

## 2024-04-08 DIAGNOSIS — N18.30 ANEMIA DUE TO STAGE 3 CHRONIC KIDNEY DISEASE, UNSPECIFIED WHETHER STAGE 3A OR 3B CKD (HCC): ICD-10-CM

## 2024-04-08 DIAGNOSIS — E08.621 DIABETIC ULCER OF LEFT MIDFOOT ASSOCIATED WITH DIABETES MELLITUS DUE TO UNDERLYING CONDITION, WITH FAT LAYER EXPOSED (HCC): ICD-10-CM

## 2024-04-08 DIAGNOSIS — E87.5 HYPERKALEMIA: Primary | ICD-10-CM

## 2024-04-08 DIAGNOSIS — N18.6 ESRD (END STAGE RENAL DISEASE) (HCC): ICD-10-CM

## 2024-04-08 LAB
ANION GAP SERPL CALCULATED.3IONS-SCNC: 22 MMOL/L (ref 7–16)
BASOPHILS ABSOLUTE: 0 K/CU MM
BASOPHILS RELATIVE PERCENT: 0.5 % (ref 0–1)
BUN SERPL-MCNC: 93 MG/DL (ref 6–23)
CALCIUM SERPL-MCNC: 6.3 MG/DL (ref 8.3–10.6)
CHLORIDE BLD-SCNC: 96 MMOL/L (ref 99–110)
CO2: 21 MMOL/L (ref 21–32)
CREAT SERPL-MCNC: 11.5 MG/DL (ref 0.9–1.3)
DIFFERENTIAL TYPE: ABNORMAL
EKG ATRIAL RATE: 89 BPM
EKG DIAGNOSIS: NORMAL
EKG P AXIS: 31 DEGREES
EKG P-R INTERVAL: 170 MS
EKG Q-T INTERVAL: 390 MS
EKG QRS DURATION: 86 MS
EKG QTC CALCULATION (BAZETT): 474 MS
EKG R AXIS: 34 DEGREES
EKG T AXIS: 79 DEGREES
EKG VENTRICULAR RATE: 89 BPM
EOSINOPHILS ABSOLUTE: 0.2 K/CU MM
EOSINOPHILS RELATIVE PERCENT: 3 % (ref 0–3)
GFR SERPL CREATININE-BSD FRML MDRD: 5 ML/MIN/1.73M2
GLUCOSE BLD-MCNC: 123 MG/DL (ref 70–99)
GLUCOSE BLD-MCNC: 126 MG/DL (ref 70–99)
GLUCOSE BLD-MCNC: 131 MG/DL (ref 70–99)
GLUCOSE BLD-MCNC: 147 MG/DL (ref 70–99)
GLUCOSE BLD-MCNC: 248 MG/DL (ref 70–99)
GLUCOSE SERPL-MCNC: 131 MG/DL (ref 70–99)
HCT VFR BLD CALC: 26.2 % (ref 42–52)
HEMOGLOBIN: 8.1 GM/DL (ref 13.5–18)
IMMATURE NEUTROPHIL %: 0.3 % (ref 0–0.43)
LYMPHOCYTES ABSOLUTE: 0.7 K/CU MM
LYMPHOCYTES RELATIVE PERCENT: 8.8 % (ref 24–44)
MCH RBC QN AUTO: 30 PG (ref 27–31)
MCHC RBC AUTO-ENTMCNC: 30.9 % (ref 32–36)
MCV RBC AUTO: 97 FL (ref 78–100)
MONOCYTES ABSOLUTE: 0.6 K/CU MM
MONOCYTES RELATIVE PERCENT: 7.3 % (ref 0–4)
NUCLEATED RBC %: 0 %
PDW BLD-RTO: 15.4 % (ref 11.7–14.9)
PLATELET # BLD: 180 K/CU MM (ref 140–440)
PMV BLD AUTO: 10.2 FL (ref 7.5–11.1)
POTASSIUM SERPL-SCNC: 5.6 MMOL/L (ref 3.5–5.1)
PRO-BNP: ABNORMAL PG/ML
RBC # BLD: 2.7 M/CU MM (ref 4.6–6.2)
SEGMENTED NEUTROPHILS ABSOLUTE COUNT: 6.3 K/CU MM
SEGMENTED NEUTROPHILS RELATIVE PERCENT: 80.1 % (ref 36–66)
SODIUM BLD-SCNC: 139 MMOL/L (ref 135–145)
TOTAL IMMATURE NEUTOROPHIL: 0.02 K/CU MM
TOTAL NUCLEATED RBC: 0 K/CU MM
TROPONIN, HIGH SENSITIVITY: 133 NG/L (ref 0–22)
TROPONIN, HIGH SENSITIVITY: 134 NG/L (ref 0–22)
TROPONIN, HIGH SENSITIVITY: 144 NG/L (ref 0–22)
WBC # BLD: 7.9 K/CU MM (ref 4–10.5)

## 2024-04-08 PROCEDURE — 80048 BASIC METABOLIC PNL TOTAL CA: CPT

## 2024-04-08 PROCEDURE — 83880 ASSAY OF NATRIURETIC PEPTIDE: CPT

## 2024-04-08 PROCEDURE — 71046 X-RAY EXAM CHEST 2 VIEWS: CPT

## 2024-04-08 PROCEDURE — 2500000003 HC RX 250 WO HCPCS: Performed by: STUDENT IN AN ORGANIZED HEALTH CARE EDUCATION/TRAINING PROGRAM

## 2024-04-08 PROCEDURE — 93010 ELECTROCARDIOGRAM REPORT: CPT | Performed by: INTERNAL MEDICINE

## 2024-04-08 PROCEDURE — 94761 N-INVAS EAR/PLS OXIMETRY MLT: CPT

## 2024-04-08 PROCEDURE — 87641 MR-STAPH DNA AMP PROBE: CPT

## 2024-04-08 PROCEDURE — 2700000000 HC OXYGEN THERAPY PER DAY

## 2024-04-08 PROCEDURE — 5A1D70Z PERFORMANCE OF URINARY FILTRATION, INTERMITTENT, LESS THAN 6 HOURS PER DAY: ICD-10-PCS | Performed by: INTERNAL MEDICINE

## 2024-04-08 PROCEDURE — 85025 COMPLETE CBC W/AUTO DIFF WBC: CPT

## 2024-04-08 PROCEDURE — 2580000003 HC RX 258: Performed by: INTERNAL MEDICINE

## 2024-04-08 PROCEDURE — 6370000000 HC RX 637 (ALT 250 FOR IP): Performed by: STUDENT IN AN ORGANIZED HEALTH CARE EDUCATION/TRAINING PROGRAM

## 2024-04-08 PROCEDURE — 36415 COLL VENOUS BLD VENIPUNCTURE: CPT

## 2024-04-08 PROCEDURE — 73562 X-RAY EXAM OF KNEE 3: CPT

## 2024-04-08 PROCEDURE — 2580000003 HC RX 258: Performed by: STUDENT IN AN ORGANIZED HEALTH CARE EDUCATION/TRAINING PROGRAM

## 2024-04-08 PROCEDURE — 90935 HEMODIALYSIS ONE EVALUATION: CPT

## 2024-04-08 PROCEDURE — 1200000000 HC SEMI PRIVATE

## 2024-04-08 PROCEDURE — 87081 CULTURE SCREEN ONLY: CPT

## 2024-04-08 PROCEDURE — 82962 GLUCOSE BLOOD TEST: CPT

## 2024-04-08 PROCEDURE — 6360000002 HC RX W HCPCS: Performed by: INTERNAL MEDICINE

## 2024-04-08 PROCEDURE — 84484 ASSAY OF TROPONIN QUANT: CPT

## 2024-04-08 PROCEDURE — 2500000003 HC RX 250 WO HCPCS: Performed by: INTERNAL MEDICINE

## 2024-04-08 PROCEDURE — 93005 ELECTROCARDIOGRAM TRACING: CPT | Performed by: STUDENT IN AN ORGANIZED HEALTH CARE EDUCATION/TRAINING PROGRAM

## 2024-04-08 PROCEDURE — 6370000000 HC RX 637 (ALT 250 FOR IP): Performed by: INTERNAL MEDICINE

## 2024-04-08 PROCEDURE — 99285 EMERGENCY DEPT VISIT HI MDM: CPT

## 2024-04-08 RX ORDER — CALCIUM GLUCONATE 20 MG/ML
1000 INJECTION, SOLUTION INTRAVENOUS ONCE
Status: COMPLETED | OUTPATIENT
Start: 2024-04-08 | End: 2024-04-08

## 2024-04-08 RX ORDER — ATORVASTATIN CALCIUM 40 MG/1
40 TABLET, FILM COATED ORAL NIGHTLY
Status: DISCONTINUED | OUTPATIENT
Start: 2024-04-08 | End: 2024-04-11 | Stop reason: HOSPADM

## 2024-04-08 RX ORDER — ONDANSETRON 2 MG/ML
4 INJECTION INTRAMUSCULAR; INTRAVENOUS EVERY 6 HOURS PRN
Status: DISCONTINUED | OUTPATIENT
Start: 2024-04-08 | End: 2024-04-11 | Stop reason: HOSPADM

## 2024-04-08 RX ORDER — ACETAMINOPHEN 650 MG/1
650 SUPPOSITORY RECTAL EVERY 6 HOURS PRN
Status: DISCONTINUED | OUTPATIENT
Start: 2024-04-08 | End: 2024-04-11 | Stop reason: HOSPADM

## 2024-04-08 RX ORDER — HEPARIN SODIUM 5000 [USP'U]/ML
5000 INJECTION, SOLUTION INTRAVENOUS; SUBCUTANEOUS EVERY 8 HOURS SCHEDULED
Status: DISCONTINUED | OUTPATIENT
Start: 2024-04-08 | End: 2024-04-11 | Stop reason: HOSPADM

## 2024-04-08 RX ORDER — CALCIUM CARBONATE 500 MG/1
2 TABLET, CHEWABLE ORAL
Status: DISCONTINUED | OUTPATIENT
Start: 2024-04-08 | End: 2024-04-08

## 2024-04-08 RX ORDER — CIPROFLOXACIN 500 MG/1
750 TABLET, FILM COATED ORAL
Status: DISCONTINUED | OUTPATIENT
Start: 2024-04-08 | End: 2024-04-11 | Stop reason: HOSPADM

## 2024-04-08 RX ORDER — POLYETHYLENE GLYCOL 3350 17 G/17G
17 POWDER, FOR SOLUTION ORAL DAILY PRN
Status: DISCONTINUED | OUTPATIENT
Start: 2024-04-08 | End: 2024-04-11 | Stop reason: HOSPADM

## 2024-04-08 RX ORDER — GLUCAGON 1 MG/ML
1 KIT INJECTION PRN
Status: DISCONTINUED | OUTPATIENT
Start: 2024-04-08 | End: 2024-04-11 | Stop reason: HOSPADM

## 2024-04-08 RX ORDER — CALCIUM ACETATE 667 MG/1
1 CAPSULE ORAL
Status: DISCONTINUED | OUTPATIENT
Start: 2024-04-08 | End: 2024-04-08

## 2024-04-08 RX ORDER — SODIUM CHLORIDE 0.9 % (FLUSH) 0.9 %
5-40 SYRINGE (ML) INJECTION PRN
Status: DISCONTINUED | OUTPATIENT
Start: 2024-04-08 | End: 2024-04-11 | Stop reason: HOSPADM

## 2024-04-08 RX ORDER — AMLODIPINE BESYLATE 5 MG/1
5 TABLET ORAL DAILY
Status: DISCONTINUED | OUTPATIENT
Start: 2024-04-08 | End: 2024-04-11 | Stop reason: HOSPADM

## 2024-04-08 RX ORDER — VITAMIN B COMPLEX
5000 TABLET ORAL DAILY
Status: DISCONTINUED | OUTPATIENT
Start: 2024-04-08 | End: 2024-04-11 | Stop reason: HOSPADM

## 2024-04-08 RX ORDER — CALCIUM ACETATE 667 MG/1
2 CAPSULE ORAL
Status: DISCONTINUED | OUTPATIENT
Start: 2024-04-08 | End: 2024-04-11 | Stop reason: HOSPADM

## 2024-04-08 RX ORDER — PREGABALIN 75 MG/1
75 CAPSULE ORAL DAILY
Status: DISCONTINUED | OUTPATIENT
Start: 2024-04-08 | End: 2024-04-11 | Stop reason: HOSPADM

## 2024-04-08 RX ORDER — ONDANSETRON 4 MG/1
4 TABLET, ORALLY DISINTEGRATING ORAL EVERY 8 HOURS PRN
Status: DISCONTINUED | OUTPATIENT
Start: 2024-04-08 | End: 2024-04-11 | Stop reason: HOSPADM

## 2024-04-08 RX ORDER — HYDRALAZINE HYDROCHLORIDE 25 MG/1
25 TABLET, FILM COATED ORAL EVERY 8 HOURS SCHEDULED
Status: DISCONTINUED | OUTPATIENT
Start: 2024-04-08 | End: 2024-04-11 | Stop reason: HOSPADM

## 2024-04-08 RX ORDER — INSULIN LISPRO 100 [IU]/ML
0-4 INJECTION, SOLUTION INTRAVENOUS; SUBCUTANEOUS NIGHTLY
Status: DISCONTINUED | OUTPATIENT
Start: 2024-04-08 | End: 2024-04-11 | Stop reason: HOSPADM

## 2024-04-08 RX ORDER — SODIUM CHLORIDE 9 MG/ML
INJECTION, SOLUTION INTRAVENOUS PRN
Status: DISCONTINUED | OUTPATIENT
Start: 2024-04-08 | End: 2024-04-11 | Stop reason: HOSPADM

## 2024-04-08 RX ORDER — CALCITRIOL 0.25 UG/1
0.5 CAPSULE, LIQUID FILLED ORAL 2 TIMES DAILY
Status: DISCONTINUED | OUTPATIENT
Start: 2024-04-08 | End: 2024-04-11 | Stop reason: HOSPADM

## 2024-04-08 RX ORDER — INSULIN GLARGINE 100 [IU]/ML
10 INJECTION, SOLUTION SUBCUTANEOUS NIGHTLY
Status: DISCONTINUED | OUTPATIENT
Start: 2024-04-08 | End: 2024-04-11 | Stop reason: HOSPADM

## 2024-04-08 RX ORDER — ACETAMINOPHEN 325 MG/1
650 TABLET ORAL EVERY 6 HOURS PRN
Status: DISCONTINUED | OUTPATIENT
Start: 2024-04-08 | End: 2024-04-11 | Stop reason: HOSPADM

## 2024-04-08 RX ORDER — DEXTROSE MONOHYDRATE 100 MG/ML
INJECTION, SOLUTION INTRAVENOUS CONTINUOUS PRN
Status: DISCONTINUED | OUTPATIENT
Start: 2024-04-08 | End: 2024-04-11 | Stop reason: HOSPADM

## 2024-04-08 RX ORDER — CARVEDILOL 25 MG/1
25 TABLET ORAL 2 TIMES DAILY WITH MEALS
Status: DISCONTINUED | OUTPATIENT
Start: 2024-04-08 | End: 2024-04-11 | Stop reason: HOSPADM

## 2024-04-08 RX ORDER — OXYCODONE AND ACETAMINOPHEN 7.5; 325 MG/1; MG/1
1 TABLET ORAL EVERY 8 HOURS PRN
Status: DISCONTINUED | OUTPATIENT
Start: 2024-04-08 | End: 2024-04-11 | Stop reason: HOSPADM

## 2024-04-08 RX ORDER — ARIPIPRAZOLE 5 MG/1
5 TABLET ORAL NIGHTLY
Status: DISCONTINUED | OUTPATIENT
Start: 2024-04-08 | End: 2024-04-11 | Stop reason: HOSPADM

## 2024-04-08 RX ORDER — DEXTROSE MONOHYDRATE 100 MG/ML
INJECTION, SOLUTION INTRAVENOUS CONTINUOUS PRN
Status: DISCONTINUED | OUTPATIENT
Start: 2024-04-08 | End: 2024-04-08

## 2024-04-08 RX ORDER — INSULIN LISPRO 100 [IU]/ML
0-4 INJECTION, SOLUTION INTRAVENOUS; SUBCUTANEOUS
Status: DISCONTINUED | OUTPATIENT
Start: 2024-04-08 | End: 2024-04-11 | Stop reason: HOSPADM

## 2024-04-08 RX ORDER — SODIUM CHLORIDE 0.9 % (FLUSH) 0.9 %
5-40 SYRINGE (ML) INJECTION EVERY 12 HOURS SCHEDULED
Status: DISCONTINUED | OUTPATIENT
Start: 2024-04-08 | End: 2024-04-11 | Stop reason: HOSPADM

## 2024-04-08 RX ORDER — GLUCAGON 1 MG/ML
1 KIT INJECTION PRN
Status: DISCONTINUED | OUTPATIENT
Start: 2024-04-08 | End: 2024-04-08

## 2024-04-08 RX ORDER — ALBUTEROL SULFATE 90 UG/1
2 AEROSOL, METERED RESPIRATORY (INHALATION) 4 TIMES DAILY PRN
Status: DISCONTINUED | OUTPATIENT
Start: 2024-04-08 | End: 2024-04-11 | Stop reason: HOSPADM

## 2024-04-08 RX ORDER — HEPARIN SODIUM 1000 [USP'U]/ML
3000 INJECTION, SOLUTION INTRAVENOUS; SUBCUTANEOUS
Status: COMPLETED | OUTPATIENT
Start: 2024-04-08 | End: 2024-04-08

## 2024-04-08 RX ADMIN — CARVEDILOL 25 MG: 25 TABLET, FILM COATED ORAL at 13:19

## 2024-04-08 RX ADMIN — COLLAGENASE SANTYL: 250 OINTMENT TOPICAL at 14:14

## 2024-04-08 RX ADMIN — SODIUM ZIRCONIUM CYCLOSILICATE 10 G: 5 POWDER, FOR SUSPENSION ORAL at 03:39

## 2024-04-08 RX ADMIN — OXYCODONE AND ACETAMINOPHEN 1 TABLET: 7.5; 325 TABLET ORAL at 21:58

## 2024-04-08 RX ADMIN — AMLODIPINE BESYLATE 5 MG: 5 TABLET ORAL at 13:19

## 2024-04-08 RX ADMIN — HEPARIN SODIUM 5000 UNITS: 5000 INJECTION INTRAVENOUS; SUBCUTANEOUS at 21:57

## 2024-04-08 RX ADMIN — HYDRALAZINE HYDROCHLORIDE 25 MG: 25 TABLET ORAL at 05:05

## 2024-04-08 RX ADMIN — INSULIN HUMAN 10 UNITS: 100 INJECTION, SOLUTION PARENTERAL at 03:41

## 2024-04-08 RX ADMIN — PREGABALIN 75 MG: 75 CAPSULE ORAL at 13:19

## 2024-04-08 RX ADMIN — ARIPIPRAZOLE 5 MG: 5 TABLET ORAL at 21:58

## 2024-04-08 RX ADMIN — CALCIUM GLUCONATE 1000 MG: 20 INJECTION, SOLUTION INTRAVENOUS at 03:59

## 2024-04-08 RX ADMIN — HYDRALAZINE HYDROCHLORIDE 25 MG: 25 TABLET ORAL at 13:18

## 2024-04-08 RX ADMIN — Medication 5000 UNITS: at 13:19

## 2024-04-08 RX ADMIN — CALCITRIOL CAPSULES 0.25 MCG 0.5 MCG: 0.25 CAPSULE ORAL at 21:58

## 2024-04-08 RX ADMIN — OXYCODONE AND ACETAMINOPHEN 1 TABLET: 7.5; 325 TABLET ORAL at 05:06

## 2024-04-08 RX ADMIN — CALCIUM ACETATE 1334 MG: 667 CAPSULE ORAL at 16:53

## 2024-04-08 RX ADMIN — HEPARIN SODIUM 3000 UNITS: 1000 INJECTION INTRAVENOUS; SUBCUTANEOUS at 07:50

## 2024-04-08 RX ADMIN — HEPARIN SODIUM 5000 UNITS: 5000 INJECTION INTRAVENOUS; SUBCUTANEOUS at 05:07

## 2024-04-08 RX ADMIN — INSULIN GLARGINE 10 UNITS: 100 INJECTION, SOLUTION SUBCUTANEOUS at 21:57

## 2024-04-08 RX ADMIN — CALCIUM ACETATE 1334 MG: 667 CAPSULE ORAL at 13:19

## 2024-04-08 RX ADMIN — CALCITRIOL CAPSULES 0.25 MCG 0.5 MCG: 0.25 CAPSULE ORAL at 13:19

## 2024-04-08 RX ADMIN — HEPARIN SODIUM 5000 UNITS: 5000 INJECTION INTRAVENOUS; SUBCUTANEOUS at 13:19

## 2024-04-08 RX ADMIN — CIPROFLOXACIN HYDROCHLORIDE 750 MG: 500 TABLET, FILM COATED ORAL at 13:18

## 2024-04-08 RX ADMIN — ATORVASTATIN CALCIUM 40 MG: 40 TABLET, FILM COATED ORAL at 21:57

## 2024-04-08 RX ADMIN — SODIUM CHLORIDE, PRESERVATIVE FREE 10 ML: 5 INJECTION INTRAVENOUS at 22:04

## 2024-04-08 RX ADMIN — OXYCODONE AND ACETAMINOPHEN 1 TABLET: 7.5; 325 TABLET ORAL at 13:18

## 2024-04-08 RX ADMIN — HYDRALAZINE HYDROCHLORIDE 25 MG: 25 TABLET ORAL at 21:57

## 2024-04-08 RX ADMIN — ONDANSETRON 4 MG: 4 TABLET, ORALLY DISINTEGRATING ORAL at 17:39

## 2024-04-08 RX ADMIN — CARVEDILOL 25 MG: 25 TABLET, FILM COATED ORAL at 16:53

## 2024-04-08 RX ADMIN — DEXTROSE MONOHYDRATE 250 ML: 100 INJECTION, SOLUTION INTRAVENOUS at 03:41

## 2024-04-08 ASSESSMENT — PAIN DESCRIPTION - DESCRIPTORS
DESCRIPTORS: ACHING;STABBING;SORE
DESCRIPTORS: STABBING;DISCOMFORT

## 2024-04-08 ASSESSMENT — ENCOUNTER SYMPTOMS
DIARRHEA: 1
NAUSEA: 0
SHORTNESS OF BREATH: 1
VOMITING: 0
COUGH: 1
SORE THROAT: 0
CHEST TIGHTNESS: 1

## 2024-04-08 ASSESSMENT — PAIN - FUNCTIONAL ASSESSMENT
PAIN_FUNCTIONAL_ASSESSMENT: PREVENTS OR INTERFERES SOME ACTIVE ACTIVITIES AND ADLS
PAIN_FUNCTIONAL_ASSESSMENT: NONE - DENIES PAIN
PAIN_FUNCTIONAL_ASSESSMENT: PREVENTS OR INTERFERES SOME ACTIVE ACTIVITIES AND ADLS

## 2024-04-08 ASSESSMENT — PAIN DESCRIPTION - ONSET
ONSET: PROGRESSIVE
ONSET: ON-GOING

## 2024-04-08 ASSESSMENT — PAIN DESCRIPTION - LOCATION
LOCATION: FOOT
LOCATION: FOOT;LEG
LOCATION: LEG;FOOT

## 2024-04-08 ASSESSMENT — PAIN DESCRIPTION - PAIN TYPE
TYPE: SURGICAL PAIN;CHRONIC PAIN
TYPE: SURGICAL PAIN;CHRONIC PAIN

## 2024-04-08 ASSESSMENT — PAIN SCALES - GENERAL
PAINLEVEL_OUTOF10: 9
PAINLEVEL_OUTOF10: 7
PAINLEVEL_OUTOF10: 8

## 2024-04-08 ASSESSMENT — PAIN DESCRIPTION - ORIENTATION
ORIENTATION: LEFT
ORIENTATION: LEFT;RIGHT
ORIENTATION: RIGHT;LEFT

## 2024-04-08 ASSESSMENT — PAIN DESCRIPTION - FREQUENCY: FREQUENCY: CONTINUOUS

## 2024-04-08 NOTE — ED NOTES
Dr. Shaikh at bedside.   
Patient to Xray  
Assessment Clean, dry & intact 04/08/24 0136   Line Status Blood return noted;Flushed 04/08/24 0136   Line Care Connections checked and tightened 04/08/24 0136   Phlebitis Assessment No symptoms 04/08/24 0136   Infiltration Assessment 0 04/08/24 0136   Dressing Status New dressing applied 04/08/24 0136       Pertinent or High Risk Medications/Drips: no   If Yes, please provide details: n/a  Blood Product Administration: no  If Yes, please provide details: n/a    Recommendation    Incomplete orders IV medications  Additional Comments: n/a   If any further questions, please call Sending RN at [phone]    Electronically signed by: Electronically signed by Laila Gutiérrez RN on 4/8/2024 at 3:20 AM

## 2024-04-08 NOTE — H&P
Progressive extensive osteolysis with pathologic fracture and deformity at the fifth metatarsal head and neck and fifth digit proximal phalanx compatible with osteomyelitis. Adjacent large soft tissue ulceration, soft tissue edema and air. JOINT SPACES: Negative foot joint space loss and osteophytes. Small posterior and plantar calcaneal enthesophytes SOFT TISSUES: Atherosclerosis. Extensive forefoot edema OTHER FINDINGS: None.     Progressive extensive osteolysis with pathologic fracture and deformity at the fifth metatarsal head and neck and fifth digit proximal phalanx compatible with osteomyelitis. Adjacent large soft tissue ulceration, soft tissue edema and air. Electronically signed by Ericka Reynoso DO      Personally reviewed Lab Studies, Imaging, and discussed case with ED physician.    Electronically signed by Valeria Stuart MD on 4/8/2024 at 2:47 AM    Comment: Please note this report has been produced using speech recognition software and may contain errors related to that system including errors in grammar, punctuation, and spelling, as well as words and phrases that may be inappropriate. If there are any questions or concerns please feel free to contact the dictating provider for clarification.

## 2024-04-08 NOTE — ED PROVIDER NOTES
pain.    Ordered a cardiac workup.  EKG is nonischemic, initial troponin is 133, repeat is currently pending.  BNP greater than 70,000.  Chest x-ray does show some fluid in the lower lung fields.  Radiology reports possible pneumonia however I currently have low concerns for pneumonia.  CBC shows no leukocytosis does show chronic anemia, hemoglobin at 8.1 which is stable.  BMP shows CKD with hyperkalemia of 5.6 and hypocalcemia of 6.3.  I called and discussed case with Dr. James, with nephrology.  Plan to dialyze him later this morning.  In the meantime I ordered insulin and glucose for hyperkalemia as well as a dose of Lokelma.  Also ordered 1 g of calcium for hypocalcemia.    Discussed results with patient and recommended admission.  He is agreeable to admission.  Called and discussed case with hospitalist who agrees admit patient.    Problems Addressed:  Anemia due to stage 3 chronic kidney disease, unspecified whether stage 3a or 3b CKD (HCC): chronic illness or injury  ESRD (end stage renal disease) (HCC): chronic illness or injury  Hyperkalemia: acute illness or injury that poses a threat to life or bodily functions  Hypervolemia, unspecified hypervolemia type: acute illness or injury  Hypocalcemia: acute illness or injury    Amount and/or Complexity of Data Reviewed  Labs: ordered.  Radiology: ordered.  ECG/medicine tests: ordered.    Risk  OTC drugs.  Prescription drug management.  Decision regarding hospitalization.              CONSULTS:  IP CONSULT TO NEPHROLOGY    CRITICAL CARE:  Total critical care time today provided was at least 33 minutes hat required close evaluation and/or intervention with concern for patient decompensation, discussion with patient and family about goals of care in the setting of a critical scenario, discussion with consulting services. This excludes seperately billable procedures and teaching.     FINAL IMPRESSION      1. Hyperkalemia    2. Hypocalcemia    3. ESRD (end stage renal

## 2024-04-08 NOTE — PROCEDURES
3.5 HOUR HD TREATMENT COMPLETED  BLOOD RETURNED WITHOUT INCIDENT  3.0L NET FLUID REMOVAL   HEPARIN GIVEN WITH TREATMENT    LEFT AVF WAS USED FOR ACCESS  CANNULATED WITHOUT DIFFICULTY  HEMOSTASIS ACHIEVED POST TREATMENT  SITES DRESSED WITH NEW GAUZE    VOICED NO NEW NEEDS  REPORT CALLED TO PRIMARY NURSE  RETURNED TO ROOM WITH TRANSPORT    TREATMENT COMPLETED BY:  SANJANA BAIRD [facility]    Name: Yovanny Levine  Patient : 1975  MRN: [id number]     Acct: [id number]  Date of Admission: 2024  Room/Bed: 1120/1120-A  Code Status:  Full Code  Allergies:   Allergies   Allergen Reactions    Adhesive Tape Rash    Doxycycline Nausea And Vomiting    Reglan [Metoclopramide] Anxiety     Diagnosis:    Patient Active Problem List   Diagnosis    Hiatal hernia    Diabetes mellitus type 2, improved controlled    Diabetic neuropathy (HCC)    Panic attack    Anxiety associated with depression    Neck pain    DANIELA (obstructive sleep apnea)    Urinary frequency    Bilateral carpal tunnel syndrome- left worse than right    Hyperlipidemia with target LDL less than 100    Essential hypertension    Bronchitis    Osteomyelitis (HCC)    Abscess    Periumbilical hernia    Sepsis (HCC)    Cellulitis of left foot    Constipation    Intractable nausea and vomiting    Uncontrolled type 2 diabetes mellitus    Nausea and vomiting    Diabetic foot infection (HCC)    Acute renal failure (ARF) (HCC)    Cellulitis    Cellulitis of left arm    Cellulitis, scrotum    Acute epididymitis    Irene gangrene    Recurrent major depressive disorder, in full remission (HCC)    Generalized anxiety disorder    Morbid obesity with body mass index (BMI) of 40.0 to 44.9 in adult (HCC)    Necrotizing fasciitis (HCC)    Syncope    Right groin wound    Ulcer of right groin with fat layer exposed (HCC)    Diabetic ulcer of left midfoot associated with type 2 diabetes mellitus, with necrosis of muscle (HCC)    Nephrotic syndrome    Generalized edema    Stage 3b

## 2024-04-08 NOTE — CARE COORDINATION
04/08/24 1418   Service Assessment   Patient Orientation Alert and Oriented   Cognition Alert   History Provided By Patient;Medical Record;Physician   Primary Caregiver Self   Support Systems Friends/Neighbors   Patient's Healthcare Decision Maker is: Legal Next of Kin   PCP Verified by CM Yes   Last Visit to PCP Within last 6 months   Prior Functional Level Independent in ADLs/IADLs   Current Functional Level Assistance with the following:;Bathing;Toileting;Mobility   Can patient return to prior living arrangement Yes   Ability to make needs known: Good   Family able to assist with home care needs: No   Would you like for me to discuss the discharge plan with any other family members/significant others, and if so, who? No   Financial Resources Medicare;Medicaid   Community Resources ECF/Home Care     From home with family. Has home wound vac. Active with [facility]. PCP and insurance. CM following for needs. Will need new HC at discharge.

## 2024-04-09 LAB
ANION GAP SERPL CALCULATED.3IONS-SCNC: 16 MMOL/L (ref 7–16)
BASOPHILS ABSOLUTE: 0 K/CU MM
BASOPHILS RELATIVE PERCENT: 0.7 % (ref 0–1)
BUN SERPL-MCNC: 60 MG/DL (ref 6–23)
CALCIUM SERPL-MCNC: 7.4 MG/DL (ref 8.3–10.6)
CHLORIDE BLD-SCNC: 97 MMOL/L (ref 99–110)
CO2: 25 MMOL/L (ref 21–32)
CREAT SERPL-MCNC: 8 MG/DL (ref 0.9–1.3)
DIFFERENTIAL TYPE: ABNORMAL
EOSINOPHILS ABSOLUTE: 0.1 K/CU MM
EOSINOPHILS RELATIVE PERCENT: 3.1 % (ref 0–3)
GFR SERPL CREATININE-BSD FRML MDRD: 8 ML/MIN/1.73M2
GLUCOSE BLD-MCNC: 123 MG/DL (ref 70–99)
GLUCOSE BLD-MCNC: 133 MG/DL (ref 70–99)
GLUCOSE BLD-MCNC: 153 MG/DL (ref 70–99)
GLUCOSE BLD-MCNC: 196 MG/DL (ref 70–99)
GLUCOSE SERPL-MCNC: 154 MG/DL (ref 70–99)
HCT VFR BLD CALC: 23.6 % (ref 42–52)
HEMOGLOBIN: 7.3 GM/DL (ref 13.5–18)
IMMATURE NEUTROPHIL %: 0.2 % (ref 0–0.43)
LYMPHOCYTES ABSOLUTE: 0.7 K/CU MM
LYMPHOCYTES RELATIVE PERCENT: 14.2 % (ref 24–44)
MCH RBC QN AUTO: 30.3 PG (ref 27–31)
MCHC RBC AUTO-ENTMCNC: 30.9 % (ref 32–36)
MCV RBC AUTO: 97.9 FL (ref 78–100)
MONOCYTES ABSOLUTE: 0.5 K/CU MM
MONOCYTES RELATIVE PERCENT: 9.8 % (ref 0–4)
MRSA, DNA, NASAL: NEGATIVE
NUCLEATED RBC %: 0 %
PDW BLD-RTO: 14.9 % (ref 11.7–14.9)
PLATELET # BLD: 160 K/CU MM (ref 140–440)
PMV BLD AUTO: 10.1 FL (ref 7.5–11.1)
POTASSIUM SERPL-SCNC: 5.2 MMOL/L (ref 3.5–5.1)
RBC # BLD: 2.41 M/CU MM (ref 4.6–6.2)
SEGMENTED NEUTROPHILS ABSOLUTE COUNT: 3.3 K/CU MM
SEGMENTED NEUTROPHILS RELATIVE PERCENT: 72 % (ref 36–66)
SODIUM BLD-SCNC: 138 MMOL/L (ref 135–145)
SPECIMEN DESCRIPTION: NORMAL
TOTAL IMMATURE NEUTOROPHIL: 0.01 K/CU MM
TOTAL NUCLEATED RBC: 0 K/CU MM
WBC # BLD: 4.6 K/CU MM (ref 4–10.5)

## 2024-04-09 PROCEDURE — 6370000000 HC RX 637 (ALT 250 FOR IP): Performed by: INTERNAL MEDICINE

## 2024-04-09 PROCEDURE — 6360000002 HC RX W HCPCS: Performed by: INTERNAL MEDICINE

## 2024-04-09 PROCEDURE — 6360000002 HC RX W HCPCS: Performed by: STUDENT IN AN ORGANIZED HEALTH CARE EDUCATION/TRAINING PROGRAM

## 2024-04-09 PROCEDURE — 36415 COLL VENOUS BLD VENIPUNCTURE: CPT

## 2024-04-09 PROCEDURE — 2500000003 HC RX 250 WO HCPCS: Performed by: INTERNAL MEDICINE

## 2024-04-09 PROCEDURE — 82962 GLUCOSE BLOOD TEST: CPT

## 2024-04-09 PROCEDURE — 94761 N-INVAS EAR/PLS OXIMETRY MLT: CPT

## 2024-04-09 PROCEDURE — 80048 BASIC METABOLIC PNL TOTAL CA: CPT

## 2024-04-09 PROCEDURE — 6370000000 HC RX 637 (ALT 250 FOR IP): Performed by: STUDENT IN AN ORGANIZED HEALTH CARE EDUCATION/TRAINING PROGRAM

## 2024-04-09 PROCEDURE — 90935 HEMODIALYSIS ONE EVALUATION: CPT

## 2024-04-09 PROCEDURE — 2580000003 HC RX 258: Performed by: INTERNAL MEDICINE

## 2024-04-09 PROCEDURE — 1200000000 HC SEMI PRIVATE

## 2024-04-09 PROCEDURE — 85025 COMPLETE CBC W/AUTO DIFF WBC: CPT

## 2024-04-09 RX ORDER — LACTOBACILLUS RHAMNOSUS GG 10B CELL
1 CAPSULE ORAL
Status: DISCONTINUED | OUTPATIENT
Start: 2024-04-10 | End: 2024-04-11 | Stop reason: HOSPADM

## 2024-04-09 RX ADMIN — INSULIN GLARGINE 10 UNITS: 100 INJECTION, SOLUTION SUBCUTANEOUS at 21:46

## 2024-04-09 RX ADMIN — OXYCODONE AND ACETAMINOPHEN 1 TABLET: 7.5; 325 TABLET ORAL at 17:39

## 2024-04-09 RX ADMIN — HYDRALAZINE HYDROCHLORIDE 25 MG: 25 TABLET ORAL at 14:15

## 2024-04-09 RX ADMIN — Medication 5000 UNITS: at 11:20

## 2024-04-09 RX ADMIN — HYDRALAZINE HYDROCHLORIDE 25 MG: 25 TABLET ORAL at 06:00

## 2024-04-09 RX ADMIN — CALCIUM ACETATE 1334 MG: 667 CAPSULE ORAL at 17:39

## 2024-04-09 RX ADMIN — ONDANSETRON 4 MG: 4 TABLET, ORALLY DISINTEGRATING ORAL at 12:38

## 2024-04-09 RX ADMIN — PREGABALIN 75 MG: 75 CAPSULE ORAL at 11:21

## 2024-04-09 RX ADMIN — ATORVASTATIN CALCIUM 40 MG: 40 TABLET, FILM COATED ORAL at 21:46

## 2024-04-09 RX ADMIN — FIDAXOMICIN 200 MG: 200 TABLET, FILM COATED ORAL at 21:58

## 2024-04-09 RX ADMIN — ARIPIPRAZOLE 5 MG: 5 TABLET ORAL at 21:57

## 2024-04-09 RX ADMIN — HEPARIN SODIUM 5000 UNITS: 5000 INJECTION INTRAVENOUS; SUBCUTANEOUS at 06:01

## 2024-04-09 RX ADMIN — OXYCODONE AND ACETAMINOPHEN 1 TABLET: 7.5; 325 TABLET ORAL at 06:00

## 2024-04-09 RX ADMIN — AMLODIPINE BESYLATE 5 MG: 5 TABLET ORAL at 11:24

## 2024-04-09 RX ADMIN — HEPARIN SODIUM 5000 UNITS: 5000 INJECTION INTRAVENOUS; SUBCUTANEOUS at 21:46

## 2024-04-09 RX ADMIN — FIDAXOMICIN 200 MG: 200 TABLET, FILM COATED ORAL at 14:17

## 2024-04-09 RX ADMIN — SODIUM CHLORIDE, PRESERVATIVE FREE 10 ML: 5 INJECTION INTRAVENOUS at 21:58

## 2024-04-09 RX ADMIN — HEPARIN SODIUM 5000 UNITS: 5000 INJECTION INTRAVENOUS; SUBCUTANEOUS at 14:17

## 2024-04-09 RX ADMIN — SODIUM CHLORIDE, PRESERVATIVE FREE 10 ML: 5 INJECTION INTRAVENOUS at 11:27

## 2024-04-09 RX ADMIN — CARVEDILOL 25 MG: 25 TABLET, FILM COATED ORAL at 17:36

## 2024-04-09 RX ADMIN — HYDROMORPHONE HYDROCHLORIDE 0.5 MG: 1 INJECTION, SOLUTION INTRAMUSCULAR; INTRAVENOUS; SUBCUTANEOUS at 11:56

## 2024-04-09 RX ADMIN — CIPROFLOXACIN HYDROCHLORIDE 750 MG: 500 TABLET, FILM COATED ORAL at 11:21

## 2024-04-09 RX ADMIN — CALCITRIOL CAPSULES 0.25 MCG 0.5 MCG: 0.25 CAPSULE ORAL at 21:45

## 2024-04-09 RX ADMIN — CALCIUM ACETATE 1334 MG: 667 CAPSULE ORAL at 12:03

## 2024-04-09 RX ADMIN — CALCITRIOL CAPSULES 0.25 MCG 0.5 MCG: 0.25 CAPSULE ORAL at 11:21

## 2024-04-09 ASSESSMENT — PAIN DESCRIPTION - ORIENTATION
ORIENTATION: LEFT
ORIENTATION: LEFT
ORIENTATION: RIGHT;LEFT
ORIENTATION: LEFT

## 2024-04-09 ASSESSMENT — PAIN DESCRIPTION - PAIN TYPE
TYPE: SURGICAL PAIN;CHRONIC PAIN
TYPE: ACUTE PAIN;CHRONIC PAIN
TYPE: ACUTE PAIN

## 2024-04-09 ASSESSMENT — PAIN DESCRIPTION - LOCATION
LOCATION: FOOT
LOCATION: FOOT
LOCATION: FOOT;LEG
LOCATION: ARM

## 2024-04-09 ASSESSMENT — PAIN SCALES - WONG BAKER
WONGBAKER_NUMERICALRESPONSE: HURTS A LITTLE BIT

## 2024-04-09 ASSESSMENT — PAIN SCALES - GENERAL
PAINLEVEL_OUTOF10: 5
PAINLEVEL_OUTOF10: 7
PAINLEVEL_OUTOF10: 5
PAINLEVEL_OUTOF10: 5
PAINLEVEL_OUTOF10: 7
PAINLEVEL_OUTOF10: 3
PAINLEVEL_OUTOF10: 7

## 2024-04-09 ASSESSMENT — ENCOUNTER SYMPTOMS
VOICE CHANGE: 0
SORE THROAT: 0
COLOR CHANGE: 0
CONSTIPATION: 0
COUGH: 0
ABDOMINAL PAIN: 0
TROUBLE SWALLOWING: 0
SINUS PRESSURE: 0
NAUSEA: 0
BACK PAIN: 0
DIARRHEA: 1
SINUS PAIN: 0
WHEEZING: 0
CHEST TIGHTNESS: 0
SHORTNESS OF BREATH: 0
VOMITING: 0

## 2024-04-09 ASSESSMENT — PAIN DESCRIPTION - DESCRIPTORS
DESCRIPTORS: THROBBING
DESCRIPTORS: ACHING;THROBBING
DESCRIPTORS: STABBING
DESCRIPTORS: STABBING

## 2024-04-09 ASSESSMENT — PAIN - FUNCTIONAL ASSESSMENT
PAIN_FUNCTIONAL_ASSESSMENT: PREVENTS OR INTERFERES SOME ACTIVE ACTIVITIES AND ADLS
PAIN_FUNCTIONAL_ASSESSMENT: ACTIVITIES ARE NOT PREVENTED
PAIN_FUNCTIONAL_ASSESSMENT: PREVENTS OR INTERFERES SOME ACTIVE ACTIVITIES AND ADLS
PAIN_FUNCTIONAL_ASSESSMENT: ACTIVITIES ARE NOT PREVENTED

## 2024-04-09 ASSESSMENT — PAIN DESCRIPTION - ONSET
ONSET: ON-GOING

## 2024-04-09 ASSESSMENT — PAIN DESCRIPTION - FREQUENCY
FREQUENCY: CONTINUOUS
FREQUENCY: INTERMITTENT
FREQUENCY: CONTINUOUS

## 2024-04-09 NOTE — PROCEDURES
15 MINUTES OF A 3 HOUR HD TREATMENT COMPLETED DUE TO VENOUS NEEDLE INFILTRATE NOTED BELOW    DR CRUZ NOTIFIED  BLOOD RETURNED WITHOUT INCIDENT     NEEDLE REMOVED, HEMOSTASIS ACHIEVED AND SITES DRESSED WITH NEW GAUZE  ICE PACK APPLIED TO INFILTRATE SITE    REPORTED CALLED TO PRIMARY NURSE  RETURNED TO ROOM WITH TRANSPORT    TREATMENT COMPLETED BY:  SANJANA BAIRD OCD[facility]    Patient Name: Yovanny Levine  Patient : 1975  MRN: [id number]     Acct: [id number]  Date of Admission: 2024  Room/Bed: 1120/1120-A  Code Status:  Full Code  Allergies:   Allergies   Allergen Reactions    Adhesive Tape Rash    Doxycycline Nausea And Vomiting    Reglan [Metoclopramide] Anxiety     Diagnosis:    Patient Active Problem List   Diagnosis    Hiatal hernia    Diabetes mellitus type 2, improved controlled    Diabetic neuropathy (HCC)    Panic attack    Anxiety associated with depression    Neck pain    DANIELA (obstructive sleep apnea)    Urinary frequency    Bilateral carpal tunnel syndrome- left worse than right    Hyperlipidemia with target LDL less than 100    Essential hypertension    Bronchitis    Osteomyelitis (HCC)    Abscess    Periumbilical hernia    Sepsis (HCC)    Cellulitis of left foot    Constipation    Intractable nausea and vomiting    Uncontrolled type 2 diabetes mellitus    Nausea and vomiting    Diabetic foot infection (HCC)    Acute renal failure (ARF) (HCC)    Cellulitis    Cellulitis of left arm    Cellulitis, scrotum    Acute epididymitis    Irene gangrene    Recurrent major depressive disorder, in full remission (HCC)    Generalized anxiety disorder    Morbid obesity with body mass index (BMI) of 40.0 to 44.9 in adult (HCC)    Necrotizing fasciitis (HCC)    Syncope    Right groin wound    Ulcer of right groin with fat layer exposed (HCC)    Diabetic ulcer of left midfoot associated with type 2 diabetes mellitus, with necrosis of muscle (HCC)    Nephrotic syndrome    Generalized edema    Stage 3b chronic

## 2024-04-10 LAB
ANION GAP SERPL CALCULATED.3IONS-SCNC: 15 MMOL/L (ref 7–16)
BASOPHILS ABSOLUTE: 0 K/CU MM
BASOPHILS RELATIVE PERCENT: 0.6 % (ref 0–1)
BUN SERPL-MCNC: 69 MG/DL (ref 6–23)
CALCIUM SERPL-MCNC: 7.3 MG/DL (ref 8.3–10.6)
CHLORIDE BLD-SCNC: 96 MMOL/L (ref 99–110)
CO2: 26 MMOL/L (ref 21–32)
CREAT SERPL-MCNC: 9.6 MG/DL (ref 0.9–1.3)
DIFFERENTIAL TYPE: ABNORMAL
EOSINOPHILS ABSOLUTE: 0.2 K/CU MM
EOSINOPHILS RELATIVE PERCENT: 4.1 % (ref 0–3)
GFR SERPL CREATININE-BSD FRML MDRD: 6 ML/MIN/1.73M2
GLUCOSE BLD-MCNC: 150 MG/DL (ref 70–99)
GLUCOSE BLD-MCNC: 172 MG/DL (ref 70–99)
GLUCOSE BLD-MCNC: 183 MG/DL (ref 70–99)
GLUCOSE BLD-MCNC: 184 MG/DL (ref 70–99)
GLUCOSE SERPL-MCNC: 127 MG/DL (ref 70–99)
HCT VFR BLD CALC: 24.1 % (ref 42–52)
HEMOGLOBIN: 7.6 GM/DL (ref 13.5–18)
IMMATURE NEUTROPHIL %: 0.2 % (ref 0–0.43)
LYMPHOCYTES ABSOLUTE: 0.8 K/CU MM
LYMPHOCYTES RELATIVE PERCENT: 15.4 % (ref 24–44)
MCH RBC QN AUTO: 30.5 PG (ref 27–31)
MCHC RBC AUTO-ENTMCNC: 31.5 % (ref 32–36)
MCV RBC AUTO: 96.8 FL (ref 78–100)
MONOCYTES ABSOLUTE: 0.5 K/CU MM
MONOCYTES RELATIVE PERCENT: 10.7 % (ref 0–4)
NEUTROPHILS RELATIVE PERCENT: 69 % (ref 36–66)
NUCLEATED RBC %: 0 %
PDW BLD-RTO: 14.8 % (ref 11.7–14.9)
PLATELET # BLD: 157 K/CU MM (ref 140–440)
PMV BLD AUTO: 10.3 FL (ref 7.5–11.1)
POTASSIUM SERPL-SCNC: 5.4 MMOL/L (ref 3.5–5.1)
RBC # BLD: 2.49 M/CU MM (ref 4.6–6.2)
SEGMENTED NEUTROPHILS ABSOLUTE COUNT: 3.4 K/CU MM
SODIUM BLD-SCNC: 137 MMOL/L (ref 135–145)
TOTAL IMMATURE NEUTOROPHIL: 0.01 K/CU MM
TOTAL NUCLEATED RBC: 0 K/CU MM
WBC # BLD: 4.9 K/CU MM (ref 4–10.5)

## 2024-04-10 PROCEDURE — 1200000000 HC SEMI PRIVATE

## 2024-04-10 PROCEDURE — 85025 COMPLETE CBC W/AUTO DIFF WBC: CPT

## 2024-04-10 PROCEDURE — 97605 NEG PRS WND THER DME<=50SQCM: CPT

## 2024-04-10 PROCEDURE — 6360000002 HC RX W HCPCS: Performed by: INTERNAL MEDICINE

## 2024-04-10 PROCEDURE — 2580000003 HC RX 258: Performed by: INTERNAL MEDICINE

## 2024-04-10 PROCEDURE — 87449 NOS EACH ORGANISM AG IA: CPT

## 2024-04-10 PROCEDURE — 6370000000 HC RX 637 (ALT 250 FOR IP): Performed by: STUDENT IN AN ORGANIZED HEALTH CARE EDUCATION/TRAINING PROGRAM

## 2024-04-10 PROCEDURE — 6370000000 HC RX 637 (ALT 250 FOR IP): Performed by: INTERNAL MEDICINE

## 2024-04-10 PROCEDURE — 80048 BASIC METABOLIC PNL TOTAL CA: CPT

## 2024-04-10 PROCEDURE — 94761 N-INVAS EAR/PLS OXIMETRY MLT: CPT

## 2024-04-10 PROCEDURE — 2500000003 HC RX 250 WO HCPCS: Performed by: INTERNAL MEDICINE

## 2024-04-10 PROCEDURE — 36415 COLL VENOUS BLD VENIPUNCTURE: CPT

## 2024-04-10 PROCEDURE — 82962 GLUCOSE BLOOD TEST: CPT

## 2024-04-10 RX ORDER — HEPARIN SODIUM 1000 [USP'U]/ML
3000 INJECTION, SOLUTION INTRAVENOUS; SUBCUTANEOUS
Status: COMPLETED | OUTPATIENT
Start: 2024-04-10 | End: 2024-04-10

## 2024-04-10 RX ADMIN — CARVEDILOL 25 MG: 25 TABLET, FILM COATED ORAL at 13:33

## 2024-04-10 RX ADMIN — CALCIUM ACETATE 1334 MG: 667 CAPSULE ORAL at 18:54

## 2024-04-10 RX ADMIN — Medication 5000 UNITS: at 13:34

## 2024-04-10 RX ADMIN — HYDRALAZINE HYDROCHLORIDE 25 MG: 25 TABLET ORAL at 13:33

## 2024-04-10 RX ADMIN — FIDAXOMICIN 200 MG: 200 TABLET, FILM COATED ORAL at 13:33

## 2024-04-10 RX ADMIN — HYDRALAZINE HYDROCHLORIDE 25 MG: 25 TABLET ORAL at 06:44

## 2024-04-10 RX ADMIN — ATORVASTATIN CALCIUM 40 MG: 40 TABLET, FILM COATED ORAL at 22:17

## 2024-04-10 RX ADMIN — HEPARIN SODIUM 5000 UNITS: 5000 INJECTION INTRAVENOUS; SUBCUTANEOUS at 06:44

## 2024-04-10 RX ADMIN — INSULIN GLARGINE 10 UNITS: 100 INJECTION, SOLUTION SUBCUTANEOUS at 22:17

## 2024-04-10 RX ADMIN — ARIPIPRAZOLE 5 MG: 5 TABLET ORAL at 22:23

## 2024-04-10 RX ADMIN — CARVEDILOL 25 MG: 25 TABLET, FILM COATED ORAL at 18:55

## 2024-04-10 RX ADMIN — OXYCODONE AND ACETAMINOPHEN 1 TABLET: 7.5; 325 TABLET ORAL at 01:35

## 2024-04-10 RX ADMIN — OXYCODONE AND ACETAMINOPHEN 1 TABLET: 7.5; 325 TABLET ORAL at 22:16

## 2024-04-10 RX ADMIN — CALCIUM ACETATE 1334 MG: 667 CAPSULE ORAL at 13:32

## 2024-04-10 RX ADMIN — AMLODIPINE BESYLATE 5 MG: 5 TABLET ORAL at 13:34

## 2024-04-10 RX ADMIN — PREGABALIN 75 MG: 75 CAPSULE ORAL at 13:35

## 2024-04-10 RX ADMIN — SODIUM CHLORIDE, PRESERVATIVE FREE 10 ML: 5 INJECTION INTRAVENOUS at 22:18

## 2024-04-10 RX ADMIN — SODIUM CHLORIDE, PRESERVATIVE FREE 10 ML: 5 INJECTION INTRAVENOUS at 13:38

## 2024-04-10 RX ADMIN — HEPARIN SODIUM 5000 UNITS: 5000 INJECTION INTRAVENOUS; SUBCUTANEOUS at 22:18

## 2024-04-10 RX ADMIN — CALCITRIOL CAPSULES 0.25 MCG 0.5 MCG: 0.25 CAPSULE ORAL at 13:35

## 2024-04-10 RX ADMIN — OXYCODONE AND ACETAMINOPHEN 1 TABLET: 7.5; 325 TABLET ORAL at 13:35

## 2024-04-10 RX ADMIN — CIPROFLOXACIN HYDROCHLORIDE 750 MG: 500 TABLET, FILM COATED ORAL at 13:32

## 2024-04-10 RX ADMIN — COLLAGENASE SANTYL: 250 OINTMENT TOPICAL at 13:36

## 2024-04-10 RX ADMIN — HEPARIN SODIUM 3000 UNITS: 1000 INJECTION INTRAVENOUS; SUBCUTANEOUS at 08:34

## 2024-04-10 RX ADMIN — HEPARIN SODIUM 5000 UNITS: 5000 INJECTION INTRAVENOUS; SUBCUTANEOUS at 13:34

## 2024-04-10 RX ADMIN — Medication 1 CAPSULE: at 13:34

## 2024-04-10 RX ADMIN — FIDAXOMICIN 200 MG: 200 TABLET, FILM COATED ORAL at 22:23

## 2024-04-10 RX ADMIN — CALCITRIOL CAPSULES 0.25 MCG 0.5 MCG: 0.25 CAPSULE ORAL at 22:17

## 2024-04-10 ASSESSMENT — ENCOUNTER SYMPTOMS
SORE THROAT: 0
CHEST TIGHTNESS: 0
SINUS PRESSURE: 0
SINUS PAIN: 0
COUGH: 0
ABDOMINAL PAIN: 0
VOMITING: 0
CONSTIPATION: 0
SHORTNESS OF BREATH: 0
VOICE CHANGE: 0
COLOR CHANGE: 0
WHEEZING: 0
DIARRHEA: 1
TROUBLE SWALLOWING: 0
NAUSEA: 0
BACK PAIN: 0

## 2024-04-10 ASSESSMENT — PAIN SCALES - GENERAL
PAINLEVEL_OUTOF10: 0
PAINLEVEL_OUTOF10: 8
PAINLEVEL_OUTOF10: 7
PAINLEVEL_OUTOF10: 8

## 2024-04-10 ASSESSMENT — PAIN DESCRIPTION - ORIENTATION
ORIENTATION: LEFT

## 2024-04-10 ASSESSMENT — PAIN DESCRIPTION - DESCRIPTORS
DESCRIPTORS: ACHING;DISCOMFORT;CRAMPING
DESCRIPTORS: SHOOTING
DESCRIPTORS: ACHING;SHOOTING

## 2024-04-10 ASSESSMENT — PAIN DESCRIPTION - PAIN TYPE: TYPE: ACUTE PAIN;SURGICAL PAIN

## 2024-04-10 ASSESSMENT — PAIN DESCRIPTION - ONSET: ONSET: ON-GOING

## 2024-04-10 ASSESSMENT — PAIN SCALES - WONG BAKER: WONGBAKER_NUMERICALRESPONSE: HURTS A LITTLE BIT

## 2024-04-10 ASSESSMENT — PAIN DESCRIPTION - LOCATION
LOCATION: FOOT

## 2024-04-10 ASSESSMENT — PAIN DESCRIPTION - FREQUENCY: FREQUENCY: CONTINUOUS

## 2024-04-10 NOTE — PLAN OF CARE
Problem: Pain  Goal: Verbalizes/displays adequate comfort level or baseline comfort level  Outcome: Progressing     Problem: ABCDS Injury Assessment  Goal: Absence of physical injury  Outcome: Progressing     Problem: Chronic Conditions and Co-morbidities  Goal: Patient's chronic conditions and co-morbidity symptoms are monitored and maintained or improved  Outcome: Progressing

## 2024-04-10 NOTE — CONSULTS
[facility] Nurse  Consult Note       Yovanny Levine  AGE: 48 y.o.   GENDER: male  : 1975  TODAY'S DATE:  2024    Subjective:     Reason for Evaluation and Assessment: NPWT/wound care eval.      Yovanny Levine is a 48 y.o. male referred by:   [x] Physician  [] Nursing  [] Other:     Wound Identification:  Wound Type: diabetic and non-healing surgical  Contributing Factors: diabetes and chronic pressure        PAST MEDICAL HISTORY        Diagnosis Date    Abscess 2010    scrotal    Acute renal failure (ARF) ([facility]) 2019    Anxiety associated with depression     Anxiety associated with depression     Back pain 2012    CAD (coronary artery disease) 02/10/2023    Chest pain 2013    Diabetic nephropathy ([facility])     Diabetic neuropathy ([facility]) 2011    Diabetic ulcer of left midfoot associated with type 2 diabetes mellitus, with fat layer exposed ([facility]) 2017    Diverticulosis     C scope + Dr. Medina    DM (diabetes mellitus), type 2 ([facility])     DR. 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 Medina    Nausea and vomiting 2019    Necrotizing fasciitis ([facility])     Nose fracture 1988    Panic attacks     Pericarditis     Hospitalized with s/p heart cath normal    Peripheral autonomic neuropathy due to diabetes mellitus ([facility])     Axonal EMG- NCS, 2011    Sebaceous cyst 2011    URI (upper respiratory infection) 2012    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 type 2 diabetes mellitus, with muscle involvement without evidence of necrosis ([facility]) 2021    WD-Wound, surgical, infected, initial encounter 2017    Wrist fracture 1986       PAST SURGICAL HISTORY    Past Surgical History:   Procedure Laterality 
Nephrology Service Consultation      [address], [address]  [place] [address]  Phone: [phone]  Office Hours: 8:30AM - 4:30PM  Monday - Friday        MEDICAL DECISION MAKING and Recommendations   Diarrhea  Diabetic foot ulcer and supposed to be on cipro for 41days from march 21st  Hyperkalemia  Hypocalcemia  Known vitamin D deficiency  Fluid overload  ESRD on HD MWF    Suggest;  HD today  Continue calcitriol and ergocalciferol  Diarrhea workup in this pt on cipro for weeks now  Will follow    Thank you      Patient Active Problem List    Diagnosis Date Noted    Volume overload 02/22/2023    Acute on chronic respiratory failure with hypoxia ([facility]) 02/10/2023    CAD (coronary artery disease) 02/10/2023    Fluid overload 02/07/2023    Problem with vascular access 11/26/2022    ESRD (end stage renal disease) ([facility]) 09/12/2022    Diabetic foot ulcer with osteomyelitis ([facility]) 09/01/2022    NSTEMI (non-ST elevated myocardial infarction) ([facility]) 08/24/2022    Chest tightness 08/22/2022    CARMEN (acute kidney injury) ([facility]) 07/25/2022    Anemia 07/25/2022    Recurrent major depressive disorder, in full remission ([facility]) 05/18/2021    Generalized anxiety disorder 05/18/2021    Hyperkalemia 04/08/2024    Absence of toe of left foot ([facility]) 02/20/2024    Diabetic ulcer of left midfoot associated with diabetes mellitus due to underlying condition, with fat layer exposed ([facility]) 01/12/2024    Open wound of foot, left, sequela 01/12/2024    Chronic foot pain, left 01/12/2024    Open wound of plantar aspect of left foot 10/17/2023    Acute osteomyelitis of metatarsal bone of left foot ([facility]) 09/05/2023    Gangrene ([facility]) 08/01/2023    Surgical wound dehiscence 02/08/2022    Chest pain 12/21/2021    Other proteinuria     FH: CAD (coronary artery disease) 09/29/2021    ASCVD (arteriosclerotic cardiovascular disease) 09/29/2021    Chronic kidney disease, stage IV (severe) ([facility]) 09/28/2021    Nephrotic syndrome 09/22/2021    
Reported on 3/12/2024) 30 tablet 0    [DISCONTINUED] furosemide (LASIX) 40 MG tablet Take 1 tablet by mouth daily 30 tablet 1    [DISCONTINUED] insulin aspart (NOVOLOG) 100 UNIT/ML injection vial Inject 35 Units into the skin 3 times daily (before meals)      [DISCONTINUED] chlorthalidone (HYGROTON) 25 MG tablet Take 1 tablet by mouth daily 30 tablet 3    [DISCONTINUED] linagliptin (TRADJENTA) 5 MG tablet Take 1 tablet by mouth daily 30 tablet 5    Lancets MISC 1 each by Does not apply route daily 100 each 3    glucose monitoring kit (FREESTYLE) monitoring kit 1 each by Does not apply route once for 1 dose. 1 kit 0         Objective:      BP (!) 165/92   Pulse 72   Temp 97.6 °F (36.4 °C) (Oral)   Resp 16   Ht 1.753 m (5' 9\")   Wt 124.8 kg (275 lb 3.2 oz)   SpO2 96%   BMI 40.64 kg/m²   Pradeep Risk Score: Pradeep Scale Score: 19    LABS    CBC:   Lab Results   Component Value Date/Time    WBC 4.9 04/10/2024 03:04 AM    RBC 2.49 04/10/2024 03:04 AM    HGB 7.6 04/10/2024 03:04 AM    HCT 24.1 04/10/2024 03:04 AM    MCV 96.8 04/10/2024 03:04 AM    MCH 30.5 04/10/2024 03:04 AM    MCHC 31.5 04/10/2024 03:04 AM    RDW 14.8 04/10/2024 03:04 AM     04/10/2024 03:04 AM    MPV 10.3 04/10/2024 03:04 AM     CMP:    Lab Results   Component Value Date/Time     04/10/2024 03:04 AM    K 5.4 04/10/2024 03:04 AM    CL 96 04/10/2024 03:04 AM    CO2 26 04/10/2024 03:04 AM    BUN 69 04/10/2024 03:04 AM    CREATININE 9.6 04/10/2024 03:04 AM    GFRAA 10 10/03/2022 08:00 AM    LABGLOM 6 04/10/2024 03:04 AM    GLUCOSE 127 04/10/2024 03:04 AM    PROT 7.7 03/12/2024 06:50 PM    PROT 7.1 04/11/2012 09:31 AM    LABALBU 3.8 03/12/2024 06:50 PM    LABALBU 48 03/27/2021 06:54 PM    CALCIUM 7.3 04/10/2024 03:04 AM    BILITOT 0.4 03/12/2024 06:50 PM    ALKPHOS 88 03/12/2024 06:50 PM    AST 11 03/12/2024 06:50 PM    ALT 7 03/12/2024 06:50 PM     Albumin:    Lab Results   Component Value Date/Time    LABALBU 3.8 03/12/2024 06:50 PM

## 2024-04-11 VITALS
TEMPERATURE: 97.3 F | RESPIRATION RATE: 18 BRPM | SYSTOLIC BLOOD PRESSURE: 151 MMHG | OXYGEN SATURATION: 93 % | HEART RATE: 65 BPM | WEIGHT: 275.2 LBS | HEIGHT: 69 IN | BODY MASS INDEX: 40.76 KG/M2 | DIASTOLIC BLOOD PRESSURE: 73 MMHG

## 2024-04-11 LAB
ANION GAP SERPL CALCULATED.3IONS-SCNC: 11 MMOL/L (ref 7–16)
BASOPHILS ABSOLUTE: 0 K/CU MM
BASOPHILS RELATIVE PERCENT: 1 % (ref 0–1)
BUN SERPL-MCNC: 43 MG/DL (ref 6–23)
CALCIUM SERPL-MCNC: 8.1 MG/DL (ref 8.3–10.6)
CHLORIDE BLD-SCNC: 100 MMOL/L (ref 99–110)
CO2: 29 MMOL/L (ref 21–32)
CREAT SERPL-MCNC: 6.6 MG/DL (ref 0.9–1.3)
DIFFERENTIAL TYPE: ABNORMAL
EOSINOPHILS ABSOLUTE: 0.2 K/CU MM
EOSINOPHILS RELATIVE PERCENT: 3.6 % (ref 0–3)
GFR SERPL CREATININE-BSD FRML MDRD: 10 ML/MIN/1.73M2
GLUCOSE BLD-MCNC: 135 MG/DL (ref 70–99)
GLUCOSE BLD-MCNC: 137 MG/DL (ref 70–99)
GLUCOSE SERPL-MCNC: 117 MG/DL (ref 70–99)
HCT VFR BLD CALC: 24.6 % (ref 42–52)
HEMOGLOBIN: 7.5 GM/DL (ref 13.5–18)
IMMATURE NEUTROPHIL %: 0.2 % (ref 0–0.43)
L PNEUMO AG UR QL IA: NEGATIVE
LYMPHOCYTES ABSOLUTE: 0.6 K/CU MM
LYMPHOCYTES RELATIVE PERCENT: 15.6 % (ref 24–44)
MCH RBC QN AUTO: 29.9 PG (ref 27–31)
MCHC RBC AUTO-ENTMCNC: 30.5 % (ref 32–36)
MCV RBC AUTO: 98 FL (ref 78–100)
MONOCYTES ABSOLUTE: 0.4 K/CU MM
MONOCYTES RELATIVE PERCENT: 10.7 % (ref 0–4)
NEUTROPHILS RELATIVE PERCENT: 68.9 % (ref 36–66)
NUCLEATED RBC %: 0 %
PDW BLD-RTO: 14.7 % (ref 11.7–14.9)
PLATELET # BLD: 156 K/CU MM (ref 140–440)
PMV BLD AUTO: 10.3 FL (ref 7.5–11.1)
POTASSIUM SERPL-SCNC: 5.2 MMOL/L (ref 3.5–5.1)
RBC # BLD: 2.51 M/CU MM (ref 4.6–6.2)
SEGMENTED NEUTROPHILS ABSOLUTE COUNT: 2.8 K/CU MM
SODIUM BLD-SCNC: 140 MMOL/L (ref 135–145)
TOTAL IMMATURE NEUTOROPHIL: 0.01 K/CU MM
TOTAL NUCLEATED RBC: 0 K/CU MM
WBC # BLD: 4.1 K/CU MM (ref 4–10.5)

## 2024-04-11 PROCEDURE — 6370000000 HC RX 637 (ALT 250 FOR IP): Performed by: STUDENT IN AN ORGANIZED HEALTH CARE EDUCATION/TRAINING PROGRAM

## 2024-04-11 PROCEDURE — 6370000000 HC RX 637 (ALT 250 FOR IP): Performed by: INTERNAL MEDICINE

## 2024-04-11 PROCEDURE — 82962 GLUCOSE BLOOD TEST: CPT

## 2024-04-11 PROCEDURE — 6360000002 HC RX W HCPCS: Performed by: INTERNAL MEDICINE

## 2024-04-11 PROCEDURE — 36415 COLL VENOUS BLD VENIPUNCTURE: CPT

## 2024-04-11 PROCEDURE — 94618 PULMONARY STRESS TESTING: CPT

## 2024-04-11 PROCEDURE — 2500000003 HC RX 250 WO HCPCS: Performed by: INTERNAL MEDICINE

## 2024-04-11 PROCEDURE — 80048 BASIC METABOLIC PNL TOTAL CA: CPT

## 2024-04-11 PROCEDURE — 2580000003 HC RX 258: Performed by: INTERNAL MEDICINE

## 2024-04-11 PROCEDURE — 85025 COMPLETE CBC W/AUTO DIFF WBC: CPT

## 2024-04-11 RX ORDER — LACTOBACILLUS RHAMNOSUS GG 10B CELL
1 CAPSULE ORAL
Qty: 30 CAPSULE | Refills: 0 | Status: SHIPPED | OUTPATIENT
Start: 2024-04-12 | End: 2024-05-12

## 2024-04-11 RX ORDER — OXYCODONE HYDROCHLORIDE AND ACETAMINOPHEN 5; 325 MG/1; MG/1
1 TABLET ORAL EVERY 6 HOURS PRN
Qty: 12 TABLET | Refills: 0 | Status: SHIPPED | OUTPATIENT
Start: 2024-04-11 | End: 2024-04-14

## 2024-04-11 RX ADMIN — CARVEDILOL 25 MG: 25 TABLET, FILM COATED ORAL at 08:32

## 2024-04-11 RX ADMIN — FIDAXOMICIN 200 MG: 200 TABLET, FILM COATED ORAL at 08:49

## 2024-04-11 RX ADMIN — SODIUM ZIRCONIUM CYCLOSILICATE 10 G: 10 POWDER, FOR SUSPENSION ORAL at 08:27

## 2024-04-11 RX ADMIN — CALCIUM ACETATE 1334 MG: 667 CAPSULE ORAL at 08:32

## 2024-04-11 RX ADMIN — POLYETHYLENE GLYCOL 3350 17 G: 17 POWDER, FOR SOLUTION ORAL at 08:42

## 2024-04-11 RX ADMIN — Medication 5000 UNITS: at 08:28

## 2024-04-11 RX ADMIN — CALCIUM ACETATE 1334 MG: 667 CAPSULE ORAL at 11:53

## 2024-04-11 RX ADMIN — HEPARIN SODIUM 5000 UNITS: 5000 INJECTION INTRAVENOUS; SUBCUTANEOUS at 05:29

## 2024-04-11 RX ADMIN — SODIUM CHLORIDE, PRESERVATIVE FREE 10 ML: 5 INJECTION INTRAVENOUS at 09:11

## 2024-04-11 RX ADMIN — CALCITRIOL CAPSULES 0.25 MCG 0.5 MCG: 0.25 CAPSULE ORAL at 08:28

## 2024-04-11 RX ADMIN — OXYCODONE AND ACETAMINOPHEN 1 TABLET: 7.5; 325 TABLET ORAL at 09:10

## 2024-04-11 RX ADMIN — HYDRALAZINE HYDROCHLORIDE 25 MG: 25 TABLET ORAL at 05:28

## 2024-04-11 RX ADMIN — CIPROFLOXACIN HYDROCHLORIDE 750 MG: 500 TABLET, FILM COATED ORAL at 08:33

## 2024-04-11 RX ADMIN — Medication 1 CAPSULE: at 08:32

## 2024-04-11 RX ADMIN — AMLODIPINE BESYLATE 5 MG: 5 TABLET ORAL at 08:33

## 2024-04-11 RX ADMIN — PREGABALIN 75 MG: 75 CAPSULE ORAL at 09:10

## 2024-04-11 ASSESSMENT — ENCOUNTER SYMPTOMS
ABDOMINAL PAIN: 0
SHORTNESS OF BREATH: 0
CONSTIPATION: 0
SINUS PRESSURE: 0
SINUS PAIN: 0
DIARRHEA: 1
VOMITING: 0
BACK PAIN: 0
COUGH: 0
WHEEZING: 0
CHEST TIGHTNESS: 0
TROUBLE SWALLOWING: 0
NAUSEA: 0
SORE THROAT: 0
COLOR CHANGE: 0
VOICE CHANGE: 0

## 2024-04-11 ASSESSMENT — PAIN DESCRIPTION - LOCATION: LOCATION: FOOT

## 2024-04-11 ASSESSMENT — PAIN - FUNCTIONAL ASSESSMENT: PAIN_FUNCTIONAL_ASSESSMENT: ACTIVITIES ARE NOT PREVENTED

## 2024-04-11 ASSESSMENT — PAIN SCALES - GENERAL
PAINLEVEL_OUTOF10: 0
PAINLEVEL_OUTOF10: 7

## 2024-04-11 ASSESSMENT — PAIN DESCRIPTION - FREQUENCY: FREQUENCY: CONTINUOUS

## 2024-04-11 ASSESSMENT — PAIN DESCRIPTION - ORIENTATION: ORIENTATION: LEFT

## 2024-04-11 ASSESSMENT — PAIN DESCRIPTION - ONSET: ONSET: ON-GOING

## 2024-04-11 ASSESSMENT — PAIN DESCRIPTION - DESCRIPTORS: DESCRIPTORS: ACHING;DISCOMFORT

## 2024-04-11 ASSESSMENT — PAIN DESCRIPTION - PAIN TYPE: TYPE: ACUTE PAIN;CHRONIC PAIN

## 2024-04-11 NOTE — DISCHARGE INSTRUCTIONS
Please complete course of additional oral antibiotic.    Plan will be for outpatient dialysis tomorrow.

## 2024-04-11 NOTE — PLAN OF CARE
Problem: Discharge Planning  Goal: Discharge to home or other facility with appropriate resources  4/11/2024 1054 by Caron Olvera RN  Outcome: Adequate for Discharge  Flowsheets (Taken 4/11/2024 0931)  Discharge to home or other facility with appropriate resources: Identify barriers to discharge with patient and caregiver  4/11/2024 0918 by Caron Olvera RN  Outcome: Progressing     Problem: Safety - Adult  Goal: Free from fall injury  4/11/2024 1054 by Caron Olvera RN  Outcome: Adequate for Discharge  4/11/2024 0918 by Caron Olvera RN  Outcome: Progressing     Problem: ABCDS Injury Assessment  Goal: Absence of physical injury  4/11/2024 1054 by Caron Olvera RN  Outcome: Adequate for Discharge  4/11/2024 0918 by Caron Olvera RN  Outcome: Progressing     Problem: Pain  Goal: Verbalizes/displays adequate comfort level or baseline comfort level  4/11/2024 1054 by Caron Olvera RN  Outcome: Adequate for Discharge  4/11/2024 0918 by Caron Olvera RN  Outcome: Progressing     Problem: Chronic Conditions and Co-morbidities  Goal: Patient's chronic conditions and co-morbidity symptoms are monitored and maintained or improved  4/11/2024 1054 by Caron Olvera RN  Outcome: Adequate for Discharge  Flowsheets (Taken 4/11/2024 0931)  Care Plan - Patient's Chronic Conditions and Co-Morbidity Symptoms are Monitored and Maintained or Improved: Monitor and assess patient's chronic conditions and comorbid symptoms for stability, deterioration, or improvement  4/11/2024 0918 by Caron Olvera RN  Outcome: Progressing

## 2024-04-11 NOTE — PROGRESS NOTES
4/11/2024 12:35 PM  Patient Room #: 1120/1120-A  Patient Name: Yovanny Levine    (Step 1 Done by RN if possible otherwise call Pulmonary Diagnostics)  Place patient on room air at rest for at least 30 minutes.  If patient falls below 88% before 30 minutes then you can record the level and stop.  Record room air saturation level _88_ %.  If patient is at 88% or below, they will qualify for home oxygen and you can stop.  If level does not fall below 88%, fill in level above. If indicated continue to Step 2.   Signature:__Sammy Lundy RRT___ Date: _04/11/2024__  (Step 2&3 Done by [facility])  Ambulate patient on room air until saturation falls below 89%.  Record level of room air saturation with ambulation___ %.  Next, place patient back on ___lpm oxygen and ambulate, record level __%.  (Note:  this level must show improvement from room air level done with ambulation.)  If patient’s saturation on room air with ambulation is 88% or below AND patient shows improvement with oxygen during ambulation, they will qualify for home oxygen and you can stop.  If patient does not drop below 89%, then patient should have an overnight oximetry trending on room air to see if level falls below 88%.  Complete level in Step 3 below.    Room air overnight oximetry level 88 % for___  cumulative minutes.  If patient’s room air oxygen level is below <89% for any amount of time they will qualify for nocturnal home oxygen.        (Attach Night Trending Report)    Complete order below: Diagnosis:__CAD__  Home oxygen at:  Length of Need: ?X Lifetime ? 3 Months     _2__lpm or __%   via  [x] nasal cannula  []mask  [] other         [x]continuous [x]  with activity  [x]  Nocturnal   [x] Portable Tanks [x]  Concentrator  [x] Conserving Device        Therapist Signature:_Sammy Lundy RRT______     Date:  _04/11/2024__  Physician Signature:  __Electronically Signed in EMR_    Date:___  Physician Printed Name:  ___Anurag Mcarthur, 
    V2.0  [facility] Hospitalist Progress Note      Name:  Yovanny Levine /Age/Sex: 1975  (48 y.o. male)   MRN & CSN:  [id number] & [id number] Encounter Date/Time: 2024 11:49 AM EDT    Location:  [facility]0/1120-A PCP: José Luis Izquierdo MD       Hospital Day: 4    Assessment and Plan:   Yovanny Levine is a 48 y.o. male who presents with Hyperkalemia      Plan:    #.  Hyperkalemia  #.  Missed dialysis  -Potassium 5.6 on presentation  -Patient was given calcium gluconate, insulin, dextrose, Lokelma in ED  -Consult nephrology  -Monitor with repeat BMP in AM.     Diarrhea  Reason for presentation. On antibiotics and concern for C. Diff  - F/U C. Diff testing  - F/u Blood culture  - Will start dificid    #.  Hypocalcemia  -Patient received calcium gluconate  -Resume home medications     #.  Diarrhea  -Patient reported having diarrhea on Friday, Saturday, resolved on .  -Monitor as patient is on antibiotics.     #.  Shortness of breath-could be secondary to missed dialysis  -Chest x-ray-right basilar airspace disease, correlate for pneumonia.  -Patient denies any fever, has productive cough cough with mucus production, no leukocytosis.  -Monitor with repeat chest x-ray  -Patient is on ciprofloxacin for diabetic foot infection.     #.  Uncontrolled hypertension  -Continue home medications-amlodipine, carvedilol, hydralazine     #.  Recent admission for diabetic foot infection  -Patient has a wound VAC  -Patient is on ciprofloxacin 750 mg daily  -Consult wound care  -Patient was positive for RSV during recent admission     #.  ESRD on HD  -Consult nephrology-DR Parmar/Dr James  -Patient is on calcium acetate, calcitriol, calcium carbonate     #.  PUD  -EGD-2023-1 nonbleeding gastric ulcer noted in the gastric fundus.      #.  Coronary artery disease  -History of PCI and stenting to LAD, left circumflex  -[facility]-2022-patent stents  -Patient not taking aspirin   -On atorvastatin, carvedilol-continue     #.  Chronic 
    V2.0  [facility] Hospitalist Progress Note      Name:  Yovanny Levine /Age/Sex: 1975  (48 y.o. male)   MRN & CSN:  [id number] & [id number] Encounter Date/Time: 4/10/2024 11:49 AM EDT    Location:  [facility]0/1120-A PCP: José Luis Izquierdo MD       Hospital Day: 3    Assessment and Plan:   Yovanny Levine is a 48 y.o. male who presents with Hyperkalemia      Plan:    #.  Hyperkalemia  #.  Missed dialysis  -Potassium 5.6  -Patient was given calcium gluconate, insulin, dextrose, Lokelma in ED  -Consult nephrology  -Monitor with repeat BMP in AM.     Diarrhea  Reason for presentation. On antibiotics and concern for C. Diff  - F/U C. Diff testing  - F/u Blood culture  - Will start dificid    #.  Hypocalcemia  -Patient received calcium gluconate  -Resume home medications     #.  Diarrhea  -Patient reported having diarrhea on Friday, Saturday, resolved on .  -Monitor as patient is on antibiotics.     #.  Shortness of breath-could be secondary to missed dialysis  -Chest x-ray-right basilar airspace disease, correlate for pneumonia.  -Patient denies any fever, has productive cough cough with mucus production, no leukocytosis.  -Monitor with repeat chest x-ray  -Patient is on ciprofloxacin for diabetic foot infection.     #.  Uncontrolled hypertension  -Continue home medications-amlodipine, carvedilol, hydralazine     #.  Recent admission for diabetic foot infection  -Patient has a wound VAC  -Patient is on ciprofloxacin 750 mg daily  -Consult wound care  -Patient was positive for RSV during recent admission     #.  ESRD on HD  -Consult nephrology-DR Parmar/Dr James  -Patient is on calcium acetate, calcitriol, calcium carbonate     #.  PUD  -EGD-2023-1 nonbleeding gastric ulcer noted in the gastric fundus.      #.  Coronary artery disease  -History of PCI and stenting to LAD, left circumflex  -[facility]-2022-patent stents  -Patient not taking aspirin   -On atorvastatin, carvedilol-continue     #.  Chronic normocytic 
   04/09/24 1140   Encounter Summary   Encounter Overview/Reason  Initial Encounter;Spiritual/Emotional Needs   Service Provided For: Patient   Referral/Consult From: [facility]   Support System Family members   Last Encounter  04/09/24  (PT dealing with bad infection, in pain due to is IV, yet comfortable and calm. He wanted prayer, he finds meaning in tutu and family.)   Complexity of Encounter Low   Begin Time 1130   End Time  1142   Total Time Calculated 12 min   Spiritual/Emotional needs   Type Spiritual Support;Emotional Distress   Grief, Loss, and Adjustments   Type Adjustment to illness   Assessment/Intervention/Outcome   Assessment Anxious;Concerns with suffering;Coping;Fearful;Powerlessness;Stress overload   Intervention Active listening;Discussed belief system/Gnosticist practices/tutu;Discussed illness injury and it’s impact;Discussed meaning/purpose;Discussed relationship with God;Explored/Affirmed feelings, thoughts, concerns;Explored Coping Skills/Resources;Nurtured Hope;Prayer (assurance of)/McGregor;Sustaining Presence/Ministry of presence   Outcome Acceptance;Comfort;Connection/Belonging;Coping;Encouraged;Expressed Gratitude   Plan and Referrals   Plan/Referrals Continue Support (comment)       
  Patient was seen in hospital for  CAD.  I am prescribing oxygen because the diagnosis and testing requires the patient to have oxygen in the home.  Conditions will improve or be benefited by oxygen use.  The patient is able to perform good mobility and therefore requires the use of a portable oxygen system for ambulation.    
 Nephrology  Dialysis Note        [address] [facility], [address] [place] [address]  Phone: [phone]  Office Hours: 8:30AM - 4:30PM  Monday - Friday          PROCEDURE:  Patient seen during hemodialysis      PHYSICIAN:  ANTHONY      INDICATION:  End-stage renal disease      RX:  See dialysis flowsheet for specifics on access, blood flow rate, dialysate baths, duration of dialysis, anticoagulation and other technical information.      COMMENTS:  SEEN ON HD, SET TO LOSE 3KG IF BP ALLOWS    Electronically signed by Melody James DO on 4/8/2024 at 8:59 AM    [facility]  MD SAMANTHA SMITH DO  [address] [facility],  [address] [place] [address]  PHONE: [phone]  FAX: [phone]   
4 Eyes Skin Assessment     NAME:  Yovanny Levine  YOB: 1975  MEDICAL RECORD NUMBER:  [id number]    The patient is being assessed for  Admission    I agree that at least one RN has performed a thorough Head to Toe Skin Assessment on the patient. ALL assessment sites listed below have been assessed.      Areas assessed by both nurses:    Head, Face, Ears, Shoulders, Back, Chest, Arms, Elbows, Hands, Sacrum. Buttock, Coccyx, Ischium, and Legs. Feet and Heels        Does the Patient have a Wound? Yes wound(s) were present on assessment. LDA wound assessment was Initiated and completed by RN       Pradeep Prevention initiated by RN: Yes  Wound Care Orders initiated by RN: Yes    Pressure Injury (Stage 3,4, Unstageable, DTI, NWPT, and Complex wounds) if present, place Wound referral order by RN under : No    New Ostomies, if present place, Ostomy referral order under : No     Nurse 1 eSignature: Electronically signed by Liseth Perkins RN on 4/8/24 at 4:39 AM EDT    **SHARE this note so that the co-signing nurse can place an eSignature**    Nurse 2 eSignature: Electronically signed by Dane Diaz RN on 4/10/24 at 6:14 AM EDT   
Nephrology Progress Note        [address] [facility], [address]  [place] [address]  Phone: [phone]  Office Hours: 8:30AM - 4:30PM  Monday - Friday 4/9/2024 6:59 AM  Subjective:   Admit Date: 4/8/2024  PCP: José Luis Izquierdo MD  Interval History:   On NC  Still with dyspnea    Diet: ADULT DIET; Regular; 4 carb choices (60 gm/meal); Low Sodium (2 gm); Low Potassium (Less than 3000 mg/day); Low Phosphorus (Less than 1000 mg)      Data:   Scheduled Meds:   [START ON 4/10/2024] epoetin paola-epbx  10,000 Units IntraVENous Once per day on Mon Wed Fri    sodium chloride flush  5-40 mL IntraVENous 2 times per day    heparin (porcine)  5,000 Units SubCUTAneous 3 times per day    amLODIPine  5 mg Oral Daily    ARIPiprazole  5 mg Oral Nightly    atorvastatin  40 mg Oral Nightly    calcitRIOL  0.5 mcg Oral BID    carvedilol  25 mg Oral BID WC    ciprofloxacin  750 mg Oral Daily    hydrALAZINE  25 mg Oral 3 times per day    insulin glargine  10 Units SubCUTAneous Nightly    pregabalin  75 mg Oral Daily    Vitamin D  5,000 Units Oral Daily    insulin lispro  0-4 Units SubCUTAneous TID WC    insulin lispro  0-4 Units SubCUTAneous Nightly    calcium acetate  2 capsule Oral TID WC    collagenase   Topical Once per day on Mon Wed Fri     Continuous Infusions:   sodium chloride      dextrose       PRN Meds:sodium chloride flush, sodium chloride, ondansetron **OR** ondansetron, polyethylene glycol, acetaminophen **OR** acetaminophen, albuterol sulfate HFA, oxyCODONE-acetaminophen, glucose, dextrose bolus **OR** dextrose bolus, glucagon (rDNA), dextrose  I/O last 3 completed shifts:  In: 3000 [I.V.:2500]  Out: 3700 [Urine:200]  I/O this shift:  In: 240 [P.O.:240]  Out: 150 [Urine:150]    Intake/Output Summary (Last 24 hours) at 4/9/2024 0659  Last data filed at 4/8/2024 2206  Gross per 24 hour   Intake 3240 ml   Output 3650 ml   Net -410 ml       CBC:   Recent Labs     04/08/24  0142 04/09/24  0444   WBC 7.9 4.6 
Outpatient Pharmacy Progress Note for Meds-to-Beds    Total number of Prescriptions Filled: 2  The following medications were dispensed to the patient during the discharge process:  Fidaxomicin  oxyCODONE-acetaminophen    Additional Documentation:  {Blank single:66727::\"Medication(s) were delivered to the patient's room prior to discharge\",\"Patient picked-up the medication(s) in the OP Pharmacy\",\"Patient's family member picked-up the medication(s) in the OP Pharmacy\",\"Medications given to nurse *** to provide to patient\",\"Medications picked-up in the OP Pharmacy by nurse *** to provide to patient\"}  The following prescription(s) were not covered under the patient's insurance because they can be purchased as OTC medications: probiotic supplement   Prior auth was completed for the fidaxomicin with co-pay of $0      Thank you for letting us serve your patients.  [address] [address]    Phone: [phone]    Fax: [phone]        
Patient was seen and admitted by my colleague Dr. Stuart and I am in agreement with her assessment and plan from the H/P from earlier this AM. Patient was seen and evaluated at dialysis this morning by myself and given the findings on his CXR and continued complaints of SOB, will pursue in addition MRSA screen, and urinary antigens, continue his PO ciprofloxacin.  
Patient's oxygen saturation 88% on room air.  Notified MD, ordered Home o2 eval.  
Pt qualified for home oxygen. Paperwork faxed to [facility]. Please do not discharge pt without oxygen. This testing will  and have to be repeated if pt has not discharged 48 hours from time testing was ordered. Please call [facility] @ [phone] if oxygen has not been delivered prior to pt discharging. Thanks.   
   157 [id number]       BMP:    Recent Labs     04/09/24  0444 04/10/24  0304 04/11/24  0257    137 140   K 5.2* 5.4* 5.2*   CL 97* 96* 100   CO2 25 26 29   BUN 60* 69* 43*   CREATININE 8.0* 9.6* 6.6*   GLUCOSE 154* 127* 117*   CALCIUM 7.4* 7.3* 8.1*         Objective:   Vitals: /82   Pulse 68   Temp 98.2 °F (36.8 °C) (Oral)   Resp 16   Ht 1.753 m (5' 9\")   Wt 124.8 kg (275 lb 3.2 oz)   SpO2 96%   BMI 40.64 kg/m²   General appearance: alert and cooperative with exam, in no acute distress  HEENT: normocephalic, atraumatic,   Neck: supple, trachea midline  Lungs: breathing comfortably on nc  Abdomen: soft, non-tender; non distended,  Extremities: no ble edema  Neurologic: alert, oriented, follows commands, interactive    MEDICAL DECISION MAKING     Patient Active Problem List    Diagnosis Date Noted    Volume overload 02/22/2023    Acute on chronic respiratory failure with hypoxia ([facility]) 02/10/2023    CAD (coronary artery disease) 02/10/2023    Fluid overload 02/07/2023    Problem with vascular access 11/26/2022    ESRD (end stage renal disease) ([facility]) 09/12/2022    Diabetic foot ulcer with osteomyelitis ([facility]) 09/01/2022    NSTEMI (non-ST elevated myocardial infarction) ([facility]) 08/24/2022    Chest tightness 08/22/2022    CARMEN (acute kidney injury) ([facility]) 07/25/2022    Anemia 07/25/2022    Recurrent major depressive disorder, in full remission ([facility]) 05/18/2021    Generalized anxiety disorder 05/18/2021    Hyperkalemia 04/08/2024    Absence of toe of left foot ([facility]) 02/20/2024    Diabetic ulcer of left midfoot associated with diabetes mellitus due to underlying condition, with fat layer exposed ([facility]) 01/12/2024    Open wound of foot, left, sequela 01/12/2024    Chronic foot pain, left 01/12/2024    Open wound of plantar aspect of left foot 10/17/2023    Acute osteomyelitis of metatarsal bone of left foot ([facility]) 09/05/2023    Gangrene ([facility]) 08/01/2023    Surgical wound dehiscence 02/08/2022    Chest pain 
  Intake 1470 ml   Output 301 ml   Net 1169 ml       CBC:   Recent Labs     04/08/24  0142 04/09/24  0444 04/10/24  0304   WBC 7.9 4.6 4.9   HGB 8.1* 7.3* 7.6*    160 157       BMP:    Recent Labs     04/08/24  0142 04/09/24  0444 04/10/24  0304    138 137   K 5.6* 5.2* 5.4*   CL 96* 97* 96*   CO2 21 25 26   BUN 93* 60* 69*   CREATININE 11.5* 8.0* 9.6*   GLUCOSE 131* 154* 127*   CALCIUM 6.3* 7.4* 7.3*       Objective:   Vitals: /68   Pulse 67   Temp 97.8 °F (36.6 °C) (Oral)   Resp 18   Ht 1.753 m (5' 9\")   Wt 124.8 kg (275 lb 3.2 oz)   SpO2 98%   BMI 40.64 kg/m²   General appearance: alert and cooperative with exam, in no acute distress  HEENT: normocephalic, atraumatic,   Neck: supple, trachea midline  Lungs: breathing comfortably on nc  Heart:: regular rate and rhythm, S1, S2 normal,  Extremities: no ble edema  Neurologic: alert, oriented, follows commands, interactive    MEDICAL DECISION MAKING     -Diarrhea  -Diabetic foot ulcer and supposed to be on cipro for 41days from march 21st  -Hyperkalemia  -Hypocalcemia  -Known vitamin D deficiency  -Fluid overload  -ESRD on HD MWF     Suggest;  HD today with 1-2L UF since he reports dizziness  Continue calcitriol and ergocalciferol  Will follow                  Electronically signed by Melody James DO on 4/10/2024 at 8:30 AM    [facility]  MD SAMANTHA SMITH DO  [address] [facility],  [address]  [place] [address]  PHONE: [phone]  FAX: [phone]       
Symptoms of Infection/Inflammation: None  If yes: Not Applicable  Dressing: Dry and Intact  Site Prep: Medical Aseptic Technique  Dressing Changed this Treatment: Yes  Comment:    Flows: Good, Patent  If access problem, who was notified:     Pre and Post-Assessment  Patient Vitals for the past 8 hrs:   Level of Consciousness Oriented X Heart Rhythm Respiratory Quality/Effort O2 Device Bilateral Breath Sounds Skin Color Skin Condition/Temp Abdomen Inspection Bowel Sounds (All Quadrants) Edema RUE Edema LUE Edema RLE Edema LLE Edema   04/10/24 0831 0 4 Regular Unlabored None (Room air) Clear Pale Dry;Warm Rounded Active Left lower extremity;Right lower extremity Trace Trace Non-pitting Non-pitting   04/10/24 1230 0 4 Regular Unlabored None (Room air) Clear Pale Dry;Warm Rounded Active Left lower extremity;Right lower extremity Trace Trace Non-pitting Non-pitting     Labs  Recent Labs     04/08/24  0142 04/09/24  0444 04/10/24  0304   WBC 7.9 4.6 4.9   HGB 8.1* 7.3* 7.6*   HCT 26.2* 23.6* 24.1*    160 157                                                                  Recent Labs     04/08/24  0142 04/09/24  0444 04/10/24  0304    138 137   K 5.6* 5.2* 5.4*   CL 96* 97* 96*   CO2 21 25 26   BUN 93* 60* 69*   CREATININE 11.5* 8.0* 9.6*   GLUCOSE 131* 154* 127*     IV Drips and Rate/Dose   sodium chloride      dextrose        Safety - Before each treatment:   Dialysis Machine No.: [id number]   Machine Number: [id number]  Dialyzer Lot No.: [id number]  RO Machine Log Sheet Completed: Yes  Machine Alarm Self Test: Completed;Passed (04/10/24 0736)     Air Foam Detector: Tested, Proper Function, pH Reading  Extracorporeal Circuit Tested for Integrity: Yes  Machine Conductivity: 14.1  Manual Conductivity: 14  Machine Ph: 7.3  Bicarbonate Concentrate Lot No.: [id number]  Acid Concentrate Lot No.: [id number]  Manual Ph: 7.2  Bleach Test (Neg): Yes  Bath Temperature: 95 °F (35 °C)  Tubing Lot#: [id number]  Conductivity Meter 
person, place, and time. Mental status is at baseline.   Psychiatric:         Mood and Affect: Mood normal.         Behavior: Behavior normal.         Thought Content: Thought content normal.         Medications:   Medications:    [START ON 4/10/2024] epoetin paola-epbx  10,000 Units IntraVENous Once per day on Mon Wed Fri    Fidaxomicin  200 mg Oral BID    HYDROmorphone  0.5 mg IntraVENous Once    [START ON 4/10/2024] lactobacillus  1 capsule Oral Daily with breakfast    sodium chloride flush  5-40 mL IntraVENous 2 times per day    heparin (porcine)  5,000 Units SubCUTAneous 3 times per day    amLODIPine  5 mg Oral Daily    ARIPiprazole  5 mg Oral Nightly    atorvastatin  40 mg Oral Nightly    calcitRIOL  0.5 mcg Oral BID    carvedilol  25 mg Oral BID WC    ciprofloxacin  750 mg Oral Daily    hydrALAZINE  25 mg Oral 3 times per day    insulin glargine  10 Units SubCUTAneous Nightly    pregabalin  75 mg Oral Daily    Vitamin D  5,000 Units Oral Daily    insulin lispro  0-4 Units SubCUTAneous TID WC    insulin lispro  0-4 Units SubCUTAneous Nightly    calcium acetate  2 capsule Oral TID WC    collagenase   Topical Once per day on Mon Wed Fri      Infusions:    sodium chloride      dextrose       PRN Meds: sodium chloride flush, 5-40 mL, PRN  sodium chloride, , PRN  ondansetron, 4 mg, Q8H PRN   Or  ondansetron, 4 mg, Q6H PRN  polyethylene glycol, 17 g, Daily PRN  acetaminophen, 650 mg, Q6H PRN   Or  acetaminophen, 650 mg, Q6H PRN  albuterol sulfate HFA, 2 puff, 4x Daily PRN  oxyCODONE-acetaminophen, 1 tablet, Q8H PRN  glucose, 4 tablet, PRN  dextrose bolus, 125 mL, PRN   Or  dextrose bolus, 250 mL, PRN  glucagon (rDNA), 1 mg, PRN  dextrose, , Continuous PRN        Labs      Recent Results (from the past 24 hour(s))   POCT Glucose    Collection Time: 04/08/24 12:06 PM   Result Value Ref Range    POC Glucose 126 (H) 70 - 99 MG/DL   POCT Glucose    Collection Time: 04/08/24  4:11 PM   Result Value Ref Range    POC Glucose

## 2024-04-16 ENCOUNTER — HOSPITAL ENCOUNTER (OUTPATIENT)
Dept: WOUND CARE | Age: 49
Discharge: HOME | End: 2024-04-16
Attending: STUDENT IN AN ORGANIZED HEALTH CARE EDUCATION/TRAINING PROGRAM
Payer: MEDICARE

## 2024-04-16 VITALS
HEART RATE: 83 BPM | RESPIRATION RATE: 18 BRPM | TEMPERATURE: 99 F | SYSTOLIC BLOOD PRESSURE: 152 MMHG | DIASTOLIC BLOOD PRESSURE: 78 MMHG

## 2024-04-16 DIAGNOSIS — S91.302A OPEN WOUND OF PLANTAR ASPECT OF LEFT FOOT: Primary | ICD-10-CM

## 2024-04-16 DIAGNOSIS — E08.621 DIABETIC ULCER OF LEFT MIDFOOT ASSOCIATED WITH DIABETES MELLITUS DUE TO UNDERLYING CONDITION, WITH FAT LAYER EXPOSED (HCC): ICD-10-CM

## 2024-04-16 DIAGNOSIS — L97.422 DIABETIC ULCER OF LEFT MIDFOOT ASSOCIATED WITH DIABETES MELLITUS DUE TO UNDERLYING CONDITION, WITH FAT LAYER EXPOSED (HCC): ICD-10-CM

## 2024-04-16 PROCEDURE — 11043 DBRDMT MUSC&/FSCA 1ST 20/<: CPT

## 2024-04-16 RX ORDER — OXYCODONE AND ACETAMINOPHEN 7.5; 325 MG/1; MG/1
1 TABLET ORAL EVERY 6 HOURS PRN
Qty: 28 TABLET | Refills: 0 | Status: SHIPPED | OUTPATIENT
Start: 2024-04-16 | End: 2024-04-23

## 2024-04-16 ASSESSMENT — PAIN SCALES - GENERAL: PAINLEVEL_OUTOF10: 8

## 2024-04-16 ASSESSMENT — PAIN SCALES - WONG BAKER: WONGBAKER_NUMERICALRESPONSE: HURTS A LITTLE BIT

## 2024-04-16 ASSESSMENT — PAIN DESCRIPTION - ORIENTATION: ORIENTATION: LEFT

## 2024-04-16 ASSESSMENT — PAIN DESCRIPTION - DESCRIPTORS: DESCRIPTORS: ACHING

## 2024-04-16 ASSESSMENT — PAIN DESCRIPTION - LOCATION: LOCATION: FOOT

## 2024-04-16 NOTE — PATIENT INSTRUCTIONS
PHYSICIAN ORDERS AND DISCHARGE INSTRUCTIONS     Wound cleansing:              Do not scrub or use excessive force.             Wash hands with soap and water before and after dressing changes.             Prior to applying a clean dressing, cleanse wound with normal saline,               wound cleanser, or mild soap and water.              Ask the physician or nurse before getting the wound(s) wet in a shower                         Grafts:             Wound Care Notes:  Rx:    Cultures:    Sees Dr Kumar.                                         Orders for this week: 2024     Fax to [facility]!      Left Lateral plantar wound: WOUND VAC THERAPY:  SKIN PREP OR MASTISOL, THEN DUODERM TO PERIWOUND FOR PROTECTION.   APPLY BLACK FOAM TO WOUND. SECURE VAC DRESSING WITH DRAPE.  SET WOUND VAC  CONTINUOUS SUCTION.   CANISTER CHANGE WEEKLY OR ACCORDING TO VOLUME OF DRAINAGE.  WOUND VAC DRESSING TO BE CHANGED BY HOME CARE ON  AND SATURDAY (OR ).  WOUND CARE CENTER WILL CHANGE ON  (NEED TO BRING VAC SUPPLIES TO APPOINTMENTS - CANISTER AND BLACK FOAM SUCTION KIT).        Follow Up Instructions: At the Wound Care Center in 1 week  Primary Wound Care Provider: Dr Webber  Call  for any questions or concerns.  Central [phone] for imaging lab work

## 2024-04-16 NOTE — PROGRESS NOTES
signed by Letha Goodman RN on 4/16/2024 at 2:56 PM  
(LOPRESSOR) 25 MG tablet Take 0.5 tablets by mouth 2 times daily 30 tablet 0    ARIPiprazole (ABILIFY) 5 MG tablet Take 1 tablet by mouth at bedtime (Patient not taking: Reported on 3/12/2024) 30 tablet 0    [DISCONTINUED] furosemide (LASIX) 40 MG tablet Take 1 tablet by mouth daily 30 tablet 1    [DISCONTINUED] insulin aspart (NOVOLOG) 100 UNIT/ML injection vial Inject 35 Units into the skin 3 times daily (before meals)      [DISCONTINUED] chlorthalidone (HYGROTON) 25 MG tablet Take 1 tablet by mouth daily 30 tablet 3    [DISCONTINUED] linagliptin (TRADJENTA) 5 MG tablet Take 1 tablet by mouth daily 30 tablet 5    Lancets MISC 1 each by Does not apply route daily 100 each 3    glucose monitoring kit (FREESTYLE) monitoring kit 1 each by Does not apply route once for 1 dose. 1 kit 0     No current facility-administered medications on file prior to encounter.       REVIEW OF SYSTEMS    Pertinent items are noted in HPI.    Constitutional: Negative for systemic symptoms including fever, chills and malaise.      Objective:      BP (!) 152/78   Pulse 83   Temp 99 °F (37.2 °C) (Temporal)   Resp 18     PHYSICAL EXAM  General: The patient is in no acute distress.    Mental status:  Patient is appropriate, is  oriented to place and plan of care.  Dermatologic exam: Visual inspection of the periwound reveals the skin to be normal in turgor and texture  Wound exam: see wound description below in procedure note  Musculoskeletal: Bilateral calves are soft and supple upon manual compression.  Negative Castellanos Neto test.    Assessment:   -Type 2 diabetes mellitus with foot ulceration down to fat layer.  Dave grade 1  -Type 2 diabetes mellitus with hyperglycemia and peripheral neuropathy  Problem List Items Addressed This Visit          Endocrine    Diabetic ulcer of left midfoot associated with diabetes mellitus due to underlying condition, with fat layer exposed (HCC)    Relevant Medications    oxyCODONE-acetaminophen

## 2024-04-22 ENCOUNTER — APPOINTMENT (OUTPATIENT)
Dept: GENERAL RADIOLOGY | Age: 49
DRG: 617 | End: 2024-04-22
Payer: MEDICARE

## 2024-04-22 ENCOUNTER — HOSPITAL ENCOUNTER (INPATIENT)
Age: 49
LOS: 5 days | Discharge: HOME HEALTH | DRG: 617 | End: 2024-04-27
Attending: STUDENT IN AN ORGANIZED HEALTH CARE EDUCATION/TRAINING PROGRAM | Admitting: STUDENT IN AN ORGANIZED HEALTH CARE EDUCATION/TRAINING PROGRAM
Payer: MEDICARE

## 2024-04-22 ENCOUNTER — TELEPHONE (OUTPATIENT)
Dept: WOUND CARE | Age: 49
End: 2024-04-22

## 2024-04-22 ENCOUNTER — ANESTHESIA EVENT (OUTPATIENT)
Dept: OPERATING ROOM | Age: 49
End: 2024-04-22
Payer: MEDICARE

## 2024-04-22 DIAGNOSIS — L97.429 DIABETIC ULCER OF LEFT MIDFOOT ASSOCIATED WITH TYPE 2 DIABETES MELLITUS, UNSPECIFIED ULCER STAGE (HCC): Primary | ICD-10-CM

## 2024-04-22 DIAGNOSIS — Z99.2 ESRD (END STAGE RENAL DISEASE) ON DIALYSIS (HCC): ICD-10-CM

## 2024-04-22 DIAGNOSIS — N18.6 ESRD (END STAGE RENAL DISEASE) ON DIALYSIS (HCC): ICD-10-CM

## 2024-04-22 DIAGNOSIS — B99.9 INFECTION: ICD-10-CM

## 2024-04-22 DIAGNOSIS — E08.621 DIABETIC ULCER OF LEFT MIDFOOT ASSOCIATED WITH DIABETES MELLITUS DUE TO UNDERLYING CONDITION, WITH FAT LAYER EXPOSED (HCC): ICD-10-CM

## 2024-04-22 DIAGNOSIS — E11.621 DIABETIC ULCER OF LEFT MIDFOOT ASSOCIATED WITH TYPE 2 DIABETES MELLITUS, UNSPECIFIED ULCER STAGE (HCC): Primary | ICD-10-CM

## 2024-04-22 DIAGNOSIS — L97.422 DIABETIC ULCER OF LEFT MIDFOOT ASSOCIATED WITH DIABETES MELLITUS DUE TO UNDERLYING CONDITION, WITH FAT LAYER EXPOSED (HCC): ICD-10-CM

## 2024-04-22 LAB
ALBUMIN SERPL-MCNC: 3.8 GM/DL (ref 3.4–5)
ALP BLD-CCNC: 77 IU/L (ref 40–128)
ALT SERPL-CCNC: 9 U/L (ref 10–40)
ANION GAP SERPL CALCULATED.3IONS-SCNC: 18 MMOL/L (ref 7–16)
AST SERPL-CCNC: 13 IU/L (ref 15–37)
BASOPHILS ABSOLUTE: 0.1 K/CU MM
BASOPHILS RELATIVE PERCENT: 0.6 % (ref 0–1)
BILIRUB SERPL-MCNC: 0.5 MG/DL (ref 0–1)
BUN SERPL-MCNC: 65 MG/DL (ref 6–23)
CALCIUM SERPL-MCNC: 7.1 MG/DL (ref 8.3–10.6)
CHLORIDE BLD-SCNC: 100 MMOL/L (ref 99–110)
CO2: 23 MMOL/L (ref 21–32)
CREAT SERPL-MCNC: 8.9 MG/DL (ref 0.9–1.3)
DIFFERENTIAL TYPE: ABNORMAL
EOSINOPHILS ABSOLUTE: 0.1 K/CU MM
EOSINOPHILS RELATIVE PERCENT: 1.6 % (ref 0–3)
GFR SERPL CREATININE-BSD FRML MDRD: 7 ML/MIN/1.73M2
GLUCOSE BLD-MCNC: 128 MG/DL (ref 70–99)
GLUCOSE BLD-MCNC: 177 MG/DL (ref 70–99)
GLUCOSE SERPL-MCNC: 181 MG/DL (ref 70–99)
HCT VFR BLD CALC: 27.1 % (ref 42–52)
HEMOGLOBIN: 8.5 GM/DL (ref 13.5–18)
IMMATURE NEUTROPHIL %: 0.5 % (ref 0–0.43)
LACTIC ACID, SEPSIS: 1.8 MMOL/L (ref 0.4–2)
LYMPHOCYTES ABSOLUTE: 0.5 K/CU MM
LYMPHOCYTES RELATIVE PERCENT: 6.1 % (ref 24–44)
MCH RBC QN AUTO: 30.4 PG (ref 27–31)
MCHC RBC AUTO-ENTMCNC: 31.4 % (ref 32–36)
MCV RBC AUTO: 96.8 FL (ref 78–100)
MONOCYTES ABSOLUTE: 0.5 K/CU MM
MONOCYTES RELATIVE PERCENT: 6.2 % (ref 0–4)
NEUTROPHILS RELATIVE PERCENT: 85 % (ref 36–66)
NUCLEATED RBC %: 0 %
PDW BLD-RTO: 15.9 % (ref 11.7–14.9)
PLATELET # BLD: 169 K/CU MM (ref 140–440)
PMV BLD AUTO: 10.1 FL (ref 7.5–11.1)
POTASSIUM SERPL-SCNC: 5 MMOL/L (ref 3.5–5.1)
RBC # BLD: 2.8 M/CU MM (ref 4.6–6.2)
SEGMENTED NEUTROPHILS ABSOLUTE COUNT: 7.4 K/CU MM
SODIUM BLD-SCNC: 141 MMOL/L (ref 135–145)
TOTAL IMMATURE NEUTOROPHIL: 0.04 K/CU MM
TOTAL NUCLEATED RBC: 0 K/CU MM
TOTAL PROTEIN: 7.5 GM/DL (ref 6.4–8.2)
WBC # BLD: 8.7 K/CU MM (ref 4–10.5)

## 2024-04-22 PROCEDURE — 94761 N-INVAS EAR/PLS OXIMETRY MLT: CPT

## 2024-04-22 PROCEDURE — 5A1D70Z PERFORMANCE OF URINARY FILTRATION, INTERMITTENT, LESS THAN 6 HOURS PER DAY: ICD-10-PCS | Performed by: INTERNAL MEDICINE

## 2024-04-22 PROCEDURE — 87040 BLOOD CULTURE FOR BACTERIA: CPT

## 2024-04-22 PROCEDURE — 82962 GLUCOSE BLOOD TEST: CPT

## 2024-04-22 PROCEDURE — 6370000000 HC RX 637 (ALT 250 FOR IP): Performed by: STUDENT IN AN ORGANIZED HEALTH CARE EDUCATION/TRAINING PROGRAM

## 2024-04-22 PROCEDURE — 99285 EMERGENCY DEPT VISIT HI MDM: CPT

## 2024-04-22 PROCEDURE — 90935 HEMODIALYSIS ONE EVALUATION: CPT

## 2024-04-22 PROCEDURE — 2580000003 HC RX 258: Performed by: STUDENT IN AN ORGANIZED HEALTH CARE EDUCATION/TRAINING PROGRAM

## 2024-04-22 PROCEDURE — 73630 X-RAY EXAM OF FOOT: CPT

## 2024-04-22 PROCEDURE — 87075 CULTR BACTERIA EXCEPT BLOOD: CPT

## 2024-04-22 PROCEDURE — 87077 CULTURE AEROBIC IDENTIFY: CPT

## 2024-04-22 PROCEDURE — 87070 CULTURE OTHR SPECIMN AEROBIC: CPT

## 2024-04-22 PROCEDURE — 80053 COMPREHEN METABOLIC PANEL: CPT

## 2024-04-22 PROCEDURE — 1200000000 HC SEMI PRIVATE

## 2024-04-22 PROCEDURE — 6370000000 HC RX 637 (ALT 250 FOR IP): Performed by: NURSE PRACTITIONER

## 2024-04-22 PROCEDURE — 85025 COMPLETE CBC W/AUTO DIFF WBC: CPT

## 2024-04-22 PROCEDURE — 83605 ASSAY OF LACTIC ACID: CPT

## 2024-04-22 PROCEDURE — 99233 SBSQ HOSP IP/OBS HIGH 50: CPT | Performed by: SURGERY

## 2024-04-22 RX ORDER — ALBUTEROL SULFATE 90 UG/1
2 AEROSOL, METERED RESPIRATORY (INHALATION) 4 TIMES DAILY PRN
Status: DISCONTINUED | OUTPATIENT
Start: 2024-04-22 | End: 2024-04-27 | Stop reason: HOSPADM

## 2024-04-22 RX ORDER — CIPROFLOXACIN 2 MG/ML
400 INJECTION, SOLUTION INTRAVENOUS EVERY 24 HOURS
Status: DISCONTINUED | OUTPATIENT
Start: 2024-04-23 | End: 2024-04-25

## 2024-04-22 RX ORDER — POLYETHYLENE GLYCOL 3350 17 G/17G
17 POWDER, FOR SOLUTION ORAL DAILY PRN
Status: DISCONTINUED | OUTPATIENT
Start: 2024-04-22 | End: 2024-04-27 | Stop reason: HOSPADM

## 2024-04-22 RX ORDER — ATORVASTATIN CALCIUM 40 MG/1
40 TABLET, FILM COATED ORAL NIGHTLY
Status: DISCONTINUED | OUTPATIENT
Start: 2024-04-22 | End: 2024-04-27 | Stop reason: HOSPADM

## 2024-04-22 RX ORDER — OXYCODONE HYDROCHLORIDE AND ACETAMINOPHEN 5; 325 MG/1; MG/1
1 TABLET ORAL ONCE
Status: COMPLETED | OUTPATIENT
Start: 2024-04-22 | End: 2024-04-22

## 2024-04-22 RX ORDER — SODIUM CHLORIDE 9 MG/ML
INJECTION, SOLUTION INTRAVENOUS PRN
Status: DISCONTINUED | OUTPATIENT
Start: 2024-04-22 | End: 2024-04-27 | Stop reason: HOSPADM

## 2024-04-22 RX ORDER — SODIUM CHLORIDE 0.9 % (FLUSH) 0.9 %
5-40 SYRINGE (ML) INJECTION PRN
Status: DISCONTINUED | OUTPATIENT
Start: 2024-04-22 | End: 2024-04-27 | Stop reason: HOSPADM

## 2024-04-22 RX ORDER — DEXTROSE MONOHYDRATE 100 MG/ML
INJECTION, SOLUTION INTRAVENOUS CONTINUOUS PRN
Status: DISCONTINUED | OUTPATIENT
Start: 2024-04-22 | End: 2024-04-27 | Stop reason: HOSPADM

## 2024-04-22 RX ORDER — INSULIN LISPRO 100 [IU]/ML
0-4 INJECTION, SOLUTION INTRAVENOUS; SUBCUTANEOUS NIGHTLY
Status: DISCONTINUED | OUTPATIENT
Start: 2024-04-22 | End: 2024-04-27 | Stop reason: HOSPADM

## 2024-04-22 RX ORDER — CALCIUM ACETATE 667 MG/1
2 CAPSULE ORAL
Status: DISCONTINUED | OUTPATIENT
Start: 2024-04-22 | End: 2024-04-27 | Stop reason: HOSPADM

## 2024-04-22 RX ORDER — VITAMIN B COMPLEX
2000 TABLET ORAL DAILY
Status: DISCONTINUED | OUTPATIENT
Start: 2024-04-22 | End: 2024-04-27 | Stop reason: HOSPADM

## 2024-04-22 RX ORDER — 0.9 % SODIUM CHLORIDE 0.9 %
1000 INTRAVENOUS SOLUTION INTRAVENOUS ONCE
Status: DISCONTINUED | OUTPATIENT
Start: 2024-04-22 | End: 2024-04-22

## 2024-04-22 RX ORDER — SODIUM CHLORIDE 0.9 % (FLUSH) 0.9 %
5-40 SYRINGE (ML) INJECTION EVERY 12 HOURS SCHEDULED
Status: DISCONTINUED | OUTPATIENT
Start: 2024-04-22 | End: 2024-04-27 | Stop reason: HOSPADM

## 2024-04-22 RX ORDER — HYDRALAZINE HYDROCHLORIDE 25 MG/1
25 TABLET, FILM COATED ORAL EVERY 8 HOURS SCHEDULED
Status: DISCONTINUED | OUTPATIENT
Start: 2024-04-22 | End: 2024-04-27 | Stop reason: HOSPADM

## 2024-04-22 RX ORDER — HEPARIN SODIUM 5000 [USP'U]/ML
5000 INJECTION, SOLUTION INTRAVENOUS; SUBCUTANEOUS EVERY 8 HOURS SCHEDULED
Status: DISCONTINUED | OUTPATIENT
Start: 2024-04-22 | End: 2024-04-27 | Stop reason: HOSPADM

## 2024-04-22 RX ORDER — GLUCAGON 1 MG/ML
1 KIT INJECTION PRN
Status: DISCONTINUED | OUTPATIENT
Start: 2024-04-22 | End: 2024-04-27 | Stop reason: HOSPADM

## 2024-04-22 RX ORDER — CALCITRIOL 0.25 UG/1
0.5 CAPSULE, LIQUID FILLED ORAL 2 TIMES DAILY
Status: DISCONTINUED | OUTPATIENT
Start: 2024-04-22 | End: 2024-04-27 | Stop reason: HOSPADM

## 2024-04-22 RX ORDER — OXYCODONE AND ACETAMINOPHEN 7.5; 325 MG/1; MG/1
1 TABLET ORAL EVERY 6 HOURS PRN
Status: DISCONTINUED | OUTPATIENT
Start: 2024-04-22 | End: 2024-04-25

## 2024-04-22 RX ORDER — ACETAMINOPHEN 650 MG/1
650 SUPPOSITORY RECTAL EVERY 6 HOURS PRN
Status: DISCONTINUED | OUTPATIENT
Start: 2024-04-22 | End: 2024-04-27 | Stop reason: HOSPADM

## 2024-04-22 RX ORDER — INSULIN GLARGINE 100 [IU]/ML
15 INJECTION, SOLUTION SUBCUTANEOUS NIGHTLY
Status: DISCONTINUED | OUTPATIENT
Start: 2024-04-22 | End: 2024-04-27 | Stop reason: HOSPADM

## 2024-04-22 RX ORDER — INSULIN LISPRO 100 [IU]/ML
10 INJECTION, SOLUTION INTRAVENOUS; SUBCUTANEOUS
Status: DISCONTINUED | OUTPATIENT
Start: 2024-04-22 | End: 2024-04-27 | Stop reason: HOSPADM

## 2024-04-22 RX ORDER — ONDANSETRON 4 MG/1
4 TABLET, ORALLY DISINTEGRATING ORAL EVERY 8 HOURS PRN
Status: DISCONTINUED | OUTPATIENT
Start: 2024-04-22 | End: 2024-04-27 | Stop reason: HOSPADM

## 2024-04-22 RX ORDER — CIPROFLOXACIN 2 MG/ML
400 INJECTION, SOLUTION INTRAVENOUS ONCE
Status: DISCONTINUED | OUTPATIENT
Start: 2024-04-22 | End: 2024-04-26

## 2024-04-22 RX ORDER — ONDANSETRON 2 MG/ML
4 INJECTION INTRAMUSCULAR; INTRAVENOUS EVERY 6 HOURS PRN
Status: DISCONTINUED | OUTPATIENT
Start: 2024-04-22 | End: 2024-04-27 | Stop reason: HOSPADM

## 2024-04-22 RX ORDER — ACETAMINOPHEN 325 MG/1
650 TABLET ORAL EVERY 6 HOURS PRN
Status: DISCONTINUED | OUTPATIENT
Start: 2024-04-22 | End: 2024-04-27 | Stop reason: HOSPADM

## 2024-04-22 RX ORDER — CALCIUM CARBONATE 500 MG/1
2 TABLET, CHEWABLE ORAL
Status: DISCONTINUED | OUTPATIENT
Start: 2024-04-22 | End: 2024-04-22

## 2024-04-22 RX ORDER — AMLODIPINE BESYLATE 5 MG/1
5 TABLET ORAL DAILY
Status: DISCONTINUED | OUTPATIENT
Start: 2024-04-22 | End: 2024-04-27 | Stop reason: HOSPADM

## 2024-04-22 RX ORDER — INSULIN LISPRO 100 [IU]/ML
0-8 INJECTION, SOLUTION INTRAVENOUS; SUBCUTANEOUS
Status: DISCONTINUED | OUTPATIENT
Start: 2024-04-22 | End: 2024-04-27 | Stop reason: HOSPADM

## 2024-04-22 RX ADMIN — INSULIN GLARGINE 15 UNITS: 100 INJECTION, SOLUTION SUBCUTANEOUS at 21:18

## 2024-04-22 RX ADMIN — CALCITRIOL CAPSULES 0.25 MCG 0.5 MCG: 0.25 CAPSULE ORAL at 21:17

## 2024-04-22 RX ADMIN — OXYCODONE AND ACETAMINOPHEN 1 TABLET: 7.5; 325 TABLET ORAL at 16:00

## 2024-04-22 RX ADMIN — HYDRALAZINE HYDROCHLORIDE 25 MG: 25 TABLET ORAL at 21:17

## 2024-04-22 RX ADMIN — OXYCODONE HYDROCHLORIDE AND ACETAMINOPHEN 1 TABLET: 5; 325 TABLET ORAL at 13:02

## 2024-04-22 RX ADMIN — SODIUM CHLORIDE, PRESERVATIVE FREE 10 ML: 5 INJECTION INTRAVENOUS at 21:18

## 2024-04-22 RX ADMIN — ATORVASTATIN CALCIUM 40 MG: 40 TABLET, FILM COATED ORAL at 21:17

## 2024-04-22 RX ADMIN — OXYCODONE AND ACETAMINOPHEN 1 TABLET: 7.5; 325 TABLET ORAL at 22:50

## 2024-04-22 ASSESSMENT — LIFESTYLE VARIABLES: SMOKING_STATUS: 1

## 2024-04-22 ASSESSMENT — PAIN DESCRIPTION - ORIENTATION
ORIENTATION: LEFT
ORIENTATION: LEFT

## 2024-04-22 ASSESSMENT — PAIN DESCRIPTION - DESCRIPTORS
DESCRIPTORS: SHARP
DESCRIPTORS: SHARP

## 2024-04-22 ASSESSMENT — PAIN DESCRIPTION - PAIN TYPE: TYPE: CHRONIC PAIN

## 2024-04-22 ASSESSMENT — PAIN DESCRIPTION - ONSET: ONSET: ON-GOING

## 2024-04-22 ASSESSMENT — PAIN DESCRIPTION - LOCATION
LOCATION: BACK;FOOT
LOCATION: FOOT

## 2024-04-22 ASSESSMENT — PAIN SCALES - GENERAL
PAINLEVEL_OUTOF10: 8

## 2024-04-22 ASSESSMENT — PAIN DESCRIPTION - FREQUENCY: FREQUENCY: CONTINUOUS

## 2024-04-22 NOTE — PROGRESS NOTES
MR WILSON IS BEING ADMITTED FOR HIS FOOT WOUND SO WILL GO AHEAD AND DO HD TODAY AS HE IS MWF SCHEDULE

## 2024-04-22 NOTE — TELEPHONE ENCOUNTER
Client's home care nurse Manda Harris called in to report that client went to a wedding this past weekend and was on his foot more than usual. Wound has deteriorated significantly and a new plantar wound has opened. Client scheduled for appt with [facility] tomorrow afternoon, but Dr. Webber instructed for the patient go to the ER and have a surgery consult. SN notified patient who said he would go after dialysis today.    Electronically signed by Fernando Cooper RN on 4/22/2024 at 8:35 AM

## 2024-04-22 NOTE — ED PROVIDER NOTES
below findings:    Interpretation pert Radiologist below, if available at the time of this note:    XR FOOT LEFT (MIN 3 VIEWS)   Final Result   1. Soft tissue ulcer adjacent to the fifth metatarsal with increased bony erosion of the fifth metatarsal since prior film consistent with osteomyelitis.   2. Diffuse soft tissue swelling.   3. No soft tissue gas.      Electronically signed by Dane Gillespie MD        XR FOOT LEFT (MIN 3 VIEWS)    Result Date: 4/22/2024  History: Diabetic wound of the left foot. COMPARISON: Left foot radiographs 3/12/2024. FINDINGS: AP, lateral, and oblique views of the left foot were obtained. Previous amputation of the first digit at the level of the mid metatarsal. Amputation of the second digit at the level of the second MTP. Focal soft tissue ulcer overlying the fifth digit at the level of the fifth MTP joint. Bony erosion of the distal aspect of the fifth metatarsal with increased erosion since prior film consistent with osteomyelitis of the fifth metatarsal shaft. Diffuse soft tissue swelling. No soft tissue gas.     1. Soft tissue ulcer adjacent to the fifth metatarsal with increased bony erosion of the fifth metatarsal since prior film consistent with osteomyelitis. 2. Diffuse soft tissue swelling. 3. No soft tissue gas. Electronically signed by Dane Gillespie MD        PROCEDURES   Unless otherwise noted below, none       Procedures    CRITICAL CARE   CRITICAL CARE NOTE:  N/A    CONSULTS:  IP CONSULT TO NEPHROLOGY  IP CONSULT TO GENERAL SURGERY      VITALS:   Vitals:    Vitals:    04/22/24 1159 04/22/24 1302 04/22/24 1358   BP: (!) 167/102     Pulse: 95     Resp: 19 19    Temp: 97.8 °F (36.6 °C)     TempSrc: Oral     SpO2: 92%     Weight:   124.8 kg (275 lb 3.2 oz)   Height:   1.753 m (5' 9\")       EMERGENCY DEPARTMENT COURSE and MDM:   Patient presents as above.  Emergent etiologies considered.  Patient seen and examined.  Work-up initiated secondary to presentation, physical exam  MEDICATIONS:  Discontinued Medications    No medications on file              (Please note that portions ofthis note were completed with a voice recognition program.  Efforts were made to edit the dictations but occasionally words are mis-transcribed.)    APPLE MOTA CNP (electronically signed)              Thalia Napier, APPLE Pineda CNP  04/22/24 1427       Thalia Napier, APRN - CNP  04/22/24 153       Thalia Napier, APRN - CNP  04/22/24 1531

## 2024-04-22 NOTE — CONSULTS
Department of GeneralSurgery   Surgical Service Dr. Forrest   Consult Note    Date of Consult: 4/22/24      Reason for Consult: Foot abscess  Requesting Physician: ED    CHIEF COMPLAINT: Osteomyelitis/foot wound    History Obtained From:  patient, electronic medical record    HISTORY OF PRESENT ILLNESS:                The patient is a 48 y.o. male who presents with foot wound.  Patient has history of first toe transmetatarsal amputation, second metatarsal amputation and fourth toe amputation.  He has been following with wound care for large lateral/plantar wound.  Imaging including x-ray today showing osteomyelitis in the fifth metatarsal.  Sent in by wound care/Dr. Webber for surgical debridement    Past Medical History:    Past Medical History:   Diagnosis Date    Abscess 2010    scrotal    Acute renal failure (ARF) ([facility]) 08/04/2019    Anxiety associated with depression     Anxiety associated with depression     Back pain 07/02/2012    CAD (coronary artery disease) 02/10/2023    Chest pain 05/01/2013    Diabetic nephropathy ([facility])     Diabetic neuropathy ([facility]) 12/22/2011    Diabetic ulcer of left midfoot associated with type 2 diabetes mellitus, with fat layer exposed ([facility]) 07/18/2017    Diverticulosis     C scope + Dr. Medina    DM (diabetes mellitus), type 2 ([facility]) 2002    DR. 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 Medina    Nausea and vomiting 01/11/2019    Necrotizing fasciitis ([facility])     Nose fracture 1988    Panic attacks     Pericarditis 2003    Hospitalized with s/p heart cath normal    Peripheral autonomic neuropathy due to diabetes mellitus (HCC)     Axonal EMG- NCS, March 2011    Sebaceous cyst 09/01/2011    URI (upper respiratory infection) 02/27/2012    WD-Diabetic ulcer of left midfoot        Physical Exam  General: No acute distress  Respiratory: Chest rise equal bilaterally  CV: Appears well perfused   Abdomen: Soft, nontender, nondistended, no rebound or guarding  Large left lateral/plantar wound with sloughing/gangrenous tissue.          DATA:    CBC:   Lab Results   Component Value Date/Time    WBC 8.7 04/22/2024 01:10 PM    RBC 2.80 04/22/2024 01:10 PM    HGB 8.5 04/22/2024 01:10 PM    HCT 27.1 04/22/2024 01:10 PM    MCV 96.8 04/22/2024 01:10 PM    MCH 30.4 04/22/2024 01:10 PM    MCHC 31.4 04/22/2024 01:10 PM    RDW 15.9 04/22/2024 01:10 PM     04/22/2024 01:10 PM    MPV 10.1 04/22/2024 01:10 PM       IMPRESSION:        Patient Active Problem List:     Hiatal hernia     Diabetes mellitus type 2, improved controlled     Diabetic neuropathy (HCC)     Panic attack     Anxiety associated with depression     Neck pain     DANIELA (obstructive sleep apnea)     Urinary frequency     Bilateral carpal tunnel syndrome- left worse than right     Hyperlipidemia with target LDL less than 100     Essential hypertension     Bronchitis     Osteomyelitis (HCC)     Abscess     Periumbilical hernia     Sepsis (HCC)     Cellulitis of left foot     Constipation     Intractable nausea and vomiting     Uncontrolled type 2 diabetes mellitus     Nausea and vomiting     Diabetic foot infection (HCC)     Acute renal failure (ARF) (HCC)     Cellulitis     Cellulitis of left arm     Cellulitis, scrotum     Acute epididymitis     Irene gangrene     Recurrent major depressive disorder, in full remission (HCC)     Generalized anxiety disorder     Morbid obesity with body mass index (BMI) of 40.0 to 44.9 in adult (HCC)     Necrotizing fasciitis (HCC)     Syncope     Right groin wound     Ulcer of right groin with fat layer exposed (HCC)     Diabetic ulcer of left midfoot associated with type 2 diabetes mellitus, with necrosis of muscle (HCC)     Nephrotic syndrome     Generalized edema     Stage 3b chronic kidney

## 2024-04-22 NOTE — CONSULTS
Nephrology Service Consultation      [address], [address]  [place] [address]  Phone: [phone]  Office Hours: 8:30AM - 4:30PM  Monday - Friday        MEDICAL DECISION MAKING and Recommendations   -Left foot diabetic ulcer  -HTN  -Hypervolemia   -Hypocalcemia  -ESRD on HD MWF  -Anemia of ESRD  -CKD-MBD  -CHF/CAD      Suggest:  -Currently on HD  -Resume calcitriol for calcium mgmt and vitamin D supplements  -Give phoslo as phos binders  -Will consider UF again tomorrow as he feels hypervolemic    Thank you      Patient Active Problem List    Diagnosis Date Noted    Volume overload 02/22/2023    Acute on chronic respiratory failure with hypoxia ([facility]) 02/10/2023    CAD (coronary artery disease) 02/10/2023    Fluid overload 02/07/2023    Problem with vascular access 11/26/2022    ESRD (end stage renal disease) ([facility]) 09/12/2022    Diabetic foot ulcer with osteomyelitis ([facility]) 09/01/2022    NSTEMI (non-ST elevated myocardial infarction) ([facility]) 08/24/2022    Chest tightness 08/22/2022    CARMEN (acute kidney injury) ([facility]) 07/25/2022    Anemia 07/25/2022    Recurrent major depressive disorder, in full remission ([facility]) 05/18/2021    Generalized anxiety disorder 05/18/2021    Hyperkalemia 04/08/2024    Absence of toe of left foot ([facility]) 02/20/2024    Diabetic ulcer of left midfoot associated with diabetes mellitus due to underlying condition, with fat layer exposed ([facility]) 01/12/2024    Open wound of foot, left, sequela 01/12/2024    Chronic foot pain, left 01/12/2024    Open wound of plantar aspect of left foot 10/17/2023    Acute osteomyelitis of metatarsal bone of left foot ([facility]) 09/05/2023    Gangrene ([facility]) 08/01/2023    Surgical wound dehiscence 02/08/2022    Chest pain 12/21/2021    Other proteinuria     FH: CAD (coronary artery disease) 09/29/2021    ASCVD (arteriosclerotic cardiovascular disease) 09/29/2021    Chronic kidney disease, stage IV (severe) ([facility]) 09/28/2021    Nephrotic syndrome 09/22/2021

## 2024-04-22 NOTE — PROGRESS NOTES
4 Eyes Skin Assessment     NAME:  Yovanny Levine  YOB: 1975  MEDICAL RECORD NUMBER:  [id number]    The patient is being assessed for  Admission    I agree that at least one RN has performed a thorough Head to Toe Skin Assessment on the patient. ALL assessment sites listed below have been assessed.      Areas assessed by both nurses:    Head, Face, Ears, Shoulders, Back, Chest, Arms, Elbows, Hands, Sacrum. Buttock, Coccyx, Ischium, Legs. Feet and Heels, and Under Medical Devices         Does the Patient have a Wound? Yes wound(s) were present on assessment. LDA wound assessment was Initiated and completed by RN    Ongoing bilateral foot problems.       Pradeep Prevention initiated by RN: No  Wound Care Orders initiated by RN: yes  For ongoing foot wounds      Pressure Injury (Stage 3,4, Unstageable, DTI, NWPT, and Complex wounds) if present, place Wound referral order by RN under : No    New Ostomies, if present place, Ostomy referral order under : No     Nurse 1 eSignature: Electronically signed by Mariluz Holland RN on 4/22/24 at 4:44 PM EDT    **SHARE this note so that the co-signing nurse can place an eSignature**    Nurse 2 eSignature: Electronically signed by Hanane Ramos RN on 4/22/24 at 7:38 PM EDT

## 2024-04-22 NOTE — ED NOTES
ED TO INPATIENT SBAR HANDOFF    Patient Name: Yovanny Levine   :  1975  48 y.o.   Preferred Name    Family/Caregiver Present no   Restraints no   C-SSRS: Risk of Suicide: No Risk  Sitter no   Sepsis Risk Score Sepsis Risk Score: 1.37      Situation  Chief Complaint   Patient presents with    Wound Check     Left wound check      Brief Description of Patient's Condition: Pt arrived to ED via walk in with c/o foot wound, pt being admitted for infection, pt is a MWF dialysis pt and is scheduled to have HD today. Nephrology and general surgery consulted at this time. Pt eating in ED about 1400.  Mental Status: oriented, alert, coherent, logical, thought processes intact, and able to concentrate and follow conversation  Arrived from: home    Imaging:   XR FOOT LEFT (MIN 3 VIEWS)   Final Result   1. Soft tissue ulcer adjacent to the fifth metatarsal with increased bony erosion of the fifth metatarsal since prior film consistent with osteomyelitis.   2. Diffuse soft tissue swelling.   3. No soft tissue gas.      Electronically signed by Dane Gillespie MD        Abnormal labs:   Abnormal Labs Reviewed   CBC WITH AUTO DIFFERENTIAL - Abnormal; Notable for the following components:       Result Value    RBC 2.80 (*)     Hemoglobin 8.5 (*)     Hematocrit 27.1 (*)     MCHC 31.4 (*)     RDW 15.9 (*)     Neutrophils % 85.0 (*)     Lymphocytes % 6.1 (*)     Monocytes % 6.2 (*)     Immature Neutrophil % 0.5 (*)     All other components within normal limits   COMPREHENSIVE METABOLIC PANEL - Abnormal; Notable for the following components:    Anion Gap 18 (*)     Glucose 181 (*)     BUN 65 (*)     Creatinine 8.9 (*)     Est, Glom Filt Rate 7 (*)     Calcium 7.1 (*)     ALT 9 (*)     AST 13 (*)     All other components within normal limits       Background  History:   Past Medical History:   Diagnosis Date    Abscess     scrotal    Acute renal failure (ARF) ([facility]) 2019    Anxiety associated with depression     Anxiety  1314   Phlebitis Assessment No symptoms 04/22/24 1314   Infiltration Assessment 0 04/22/24 1314   Dressing Status New dressing applied;Clean, dry & intact 04/22/24 1314   Dressing Type Transparent 04/22/24 1314   Dressing Intervention New 04/22/24 1314       Pertinent or High Risk Medications/Drips: yes   If Yes, please provide details: abx  Blood Product Administration: no  If Yes, please provide details:     Recommendation    Incomplete orders   Additional Comments:    If any further questions, please call Sending RN at [phone]    Electronically signed by: Electronically signed by Veronique Gomez RN on 4/22/2024 at 3:24 PM

## 2024-04-22 NOTE — PROGRESS NOTES
Infection/Inflammation: None  If yes: Not Applicable  Dressing: Dry and Intact  Site Prep: Medical Aseptic Technique  Dressing Changed this Treatment: Yes  Comment:    Flows: Good, Patent  If access problem, who was notified:     Pre and Post-Assessment  Patient Vitals for the past 8 hrs:   Level of Consciousness Oriented X Heart Rhythm Respiratory Quality/Effort O2 Device Bilateral Breath Sounds Skin Condition/Temp Abdomen Inspection Bowel Sounds (All Quadrants) Edema RUE Edema LUE Edema RLE Edema LLE Edema Pain Level   04/22/24 1302 -- -- -- -- -- -- -- -- -- -- -- -- -- -- 8   04/22/24 1646 0 -- -- Unlabored -- -- -- Rounded -- Left lower extremity;Right lower extremity Trace Trace Non-pitting Non-pitting --   04/22/24 1918 0 4 Regular Unlabored None (Room air) Diminished Dry;Warm Rounded Active Left lower extremity;Right lower extremity Trace Trace Non-pitting Non-pitting --     Labs  Recent Labs     04/22/24  1310   WBC 8.7   HGB 8.5*   HCT 27.1*                                                                     Recent Labs     04/22/24  1310      K 5.0      CO2 23   BUN 65*   CREATININE 8.9*   GLUCOSE 181*     IV Drips and Rate/Dose   dextrose      sodium chloride        Safety - Before each treatment:   Dialysis Machine No.: [id number]   Machine Number: [id number]  Dialyzer Lot No.: [id number]  RO Machine Log Sheet Completed: Yes  Machine Alarm Self Test: Completed;Passed (04/22/24 1611)     Air Foam Detector: Tested, Proper Function  Extracorporeal Circuit Tested for Integrity: Yes  Machine Conductivity: 13.9  Manual Conductivity: 14     Bicarbonate Concentrate Lot No.: [id number]  Acid Concentrate Lot No.: [id number]  Manual Ph: 7.4  Bleach Test (Neg): Yes  Bath Temperature: 95 °F (35 °C)  Tubing Lot#: [id number]  Conductivity Meter Serial #: [id number]  All Connections Secure?: Yes  Venous Parameters Set?: Yes  Arterial Parameters Set?: Yes  Saline Line Double Clamped?: Yes  Air Foam Detector  (36.6 °C) 95 19 92 % -- -- -- --   04/22/24 1302 -- -- -- 19 -- -- -- -- --   04/22/24 1358 -- -- -- -- -- 1.753 m (5' 9\") 124.8 kg (275 lb 3.2 oz) Stated 0   04/22/24 1434 (!) 176/98 -- 89 -- 93 % -- -- -- --   04/22/24 1502 (!) 152/88 -- 84 -- 95 % -- -- -- --   04/22/24 1611 (!) 151/92 98 °F (36.7 °C) 86 15 97 % -- 128.8 kg (283 lb 15.2 oz) Bed scale 3.18   04/22/24 1615 (!) 150/89 -- 82 -- -- -- -- -- --   04/22/24 1630 (!) 162/98 -- 84 18 -- -- -- -- --   04/22/24 1645 (!) 160/95 -- 85 -- -- -- -- -- --   04/22/24 1700 (!) 156/94 -- 86 -- -- -- -- -- --   04/22/24 1715 (!) 169/110 -- 86 -- -- -- -- -- --   04/22/24 1730 (!) 171/95 -- 87 -- -- -- -- -- --   04/22/24 1744 (!) 154/98 -- 85 -- -- -- -- -- --   04/22/24 1800 (!) 174/94 -- 86 -- -- -- -- -- --   04/22/24 1815 (!) 145/88 -- 87 -- -- -- -- -- --   04/22/24 1830 (!) 161/93 -- 85 -- -- -- -- -- --   04/22/24 1845 (!) 149/66 -- 80 -- -- -- -- -- --   04/22/24 1900 (!) 155/84 -- 86 -- -- -- -- -- --   04/22/24 1915 (!) 156/92 -- 86 -- -- -- -- -- --   04/22/24 1918 (!) 162/90 98 °F (36.7 °C) 84 18 95 % -- -- -- --     Post-Dialysis  Arterial Catheter Locking Solution: Not Applicable  Venous Catheter Locking Solution: Not Applicable  Post-Treatment Procedures: Blood returned, Access bleeding time < 10 minutes  Machine Disinfection Process: Acid/Vinegar Clean, Heat Disinfect, Exterior Machine Disinfection  Rinseback Volume (ml): 300 ml  Blood Volume Processed (Liters): 60.6 L  Dialyzer Clearance: Lightly streaked  Duration of Treatment (minutes): 180 minutes     Hemodialysis Intake (ml): 500 ml  Hemodialysis Output (ml): 3500 ml     Tolerated Treatment: Good  Patient Response to Treatment: well          Provider Notification        Handoff complete and report given to Primary RN at 1922 hours.  Primary RN (First Initial, Last Name, Title):  DONOVAN Kwok LPN     Education  Person Educated: Patient   Knowledge Base: Substantial  Barriers to Learning?:

## 2024-04-22 NOTE — ANESTHESIA PRE PROCEDURE
Department of Anesthesiology  Preprocedure Note       Name:  Yovanny Levine   Age:  48 y.o.  :  1975                                          MRN:  [id number]         Date:  2024      Surgeon: Surgeon(s):  Claudy Forrest DO    Procedure: Procedure(s):  FOOT DEBRIDEMENT INCISION AND DRAINAGE  left 5th TOE AMPUTATION    Medications prior to admission:   Prior to Admission medications    Medication Sig Start Date End Date Taking? Authorizing Provider   oxyCODONE-acetaminophen (PERCOCET) 7.5-325 MG per tablet Take 1 tablet by mouth every 6 hours as needed for Pain for up to 7 days. Intended supply: 30 days Max Daily Amount: 4 tablets 24  Andrzej Webber DPM   lactobacillus (CULTURELLE) capsule Take 1 capsule by mouth daily (with breakfast) 24  Anurag Mcarthur MD   pregabalin (LYRICA) 75 MG capsule Take 1 capsule by mouth daily for 30 days. Max Daily Amount: 75 mg 3/21/24 4/20/24  Dean Mcclendon MD   Wound Cleansers (VASHE WOUND THERAPY) external solution During wound care 3/20/24   Dean Mcclendon MD   ciprofloxacin (CIPRO) 750 MG tablet Take 1 tablet by mouth every 24 hours for 41 doses 3/21/24 5/1/24  Dean Mcclendon MD   calcitRIOL (ROCALTROL) 0.5 MCG capsule Take 1 capsule by mouth 2 times daily 3/20/24   Dean Mcclendon MD   Vitamin D (CHOLECALCIFEROL) 25 MCG (1000 UT) TABS tablet Take 5 tablets by mouth daily 3/21/24 4/20/24  Dean Mcclendon MD   albuterol sulfate HFA (VENTOLIN HFA) 108 (90 Base) MCG/ACT inhaler Inhale 2 puffs into the lungs 4 times daily as needed for Wheezing 3/20/24   Dean Mcclendon MD   epoetin paola-epbx (RETACRIT) 53873 UNIT/ML SOLN injection Inject 1 mL into the skin Every Monday, Wednesday, and Friday  Patient not taking: Reported on 2024 10/16/23   Mary Long MD   insulin glargine (LANTUS SOLOSTAR) 100 UNIT/ML injection pen Inject 10 Units into the skin nightly 23   Ashlie Brown MD   glucose 4

## 2024-04-22 NOTE — H&P
V2.0  History and Physical      Name:  Yovanny Levine /Age/Sex: 1975  (48 y.o. male)   MRN & CSN:  [id number] & [id number] Encounter Date/Time: 2024 2:58 PM EDT   Location:  ED14/ED-14 PCP: José Luis Izquierdo MD       Hospital Day: 1    Assessment and Plan:   Yovanny Levine is a 48 y.o. male who presents with Osteomyelitis (HCC)    Hospital Problems             Last Modified POA    * (Principal) Osteomyelitis (HCC) 2024 Yes       Plan:    Left foot osteomyelitis  -Has been on ciprofloxacin since discharge last time based on prior cultures.  Wound now getting worse.  Podiatry has been consulted for possible debridement.  Continue ciprofloxacin for now and await further surgical cultures as he has not had any systemic signs such as leukocytosis or fevers that are concerning for resistance.  -Follow-up on results of blood culture.    ESRD on dialysis  -Nephrology consulted.  Plan for dialysis today    Essential hypertension  -Continue home amlodipine, hydralazine.    CAD status post stents  -Continue home statin.  He is not on any antiplatelet agents at home.  Recommend cardiology follow-up outpatient.    Chronic anemia  -Likely secondary to ESRD.  No signs of blood loss.  Monitor daily CBCs.    Type 2 diabetes mellitus  -Resume home regimen of basal and bolus insulin.  Add on medium dose sliding scale.  Blood glucose checks before every meal and at bedtime.  Hypoglycemia protocol ordered    Disposition:   Current Living situation: Home  Expected Disposition: Home  Estimated D/C: 3 days    Diet Carb controlled diet.     DVT Prophylaxis [] Lovenox, [x]  Heparin, [] SCDs, [] Ambulation,  [] Eliquis, [] Xarelto, [] Coumadin   Code Status Full code   Surrogate Decision Maker/ POA Child Yovanny Levine     Personally reviewed Lab Studies and Imaging     Discussed management of the case with ED provider who recommended admission and agree      Imaging that was interpreted personally includes left foot x-ray  Nausea And Vomiting    Reglan [Metoclopramide] Anxiety     Fam HX:  family history includes ADHD in his daughter; Bleeding Prob in his mother; Cancer in his mother; Diabetes in his father and sister; Heart Disease in his father; High Blood Pressure in his father, sister, and another family member; Kidney Disease in his father; Mental Illness in his sister and another family member; Obesity in his father and sister; Other in his son; Stroke in his mother.  Soc HX:   Social History     Socioeconomic History    Marital status:      Spouse name: None    Number of children: None    Years of education: None    Highest education level: None   Tobacco Use    Smoking status: Former     Current packs/day: 0.00     Types: Cigarettes     Start date: 2002     Quit date: 2022     Years since quittin.8    Smokeless tobacco: Never    Tobacco comments:     Smokes when drinking/social   Vaping Use    Vaping Use: Never used   Substance and Sexual Activity    Alcohol use: Not Currently     Comment: occ    Drug use: Yes     Frequency: 7.0 times per week     Types: Marijuana (Weed)     Comment: 21 @     Sexual activity: Yes     Partners: Female     Social Determinants of Health     Food Insecurity: No Food Insecurity (2024)    Hunger Vital Sign     Worried About Running Out of Food in the Last Year: Never true     Ran Out of Food in the Last Year: Never true   Transportation Needs: No Transportation Needs (2024)    PRAPARE - Transportation     Lack of Transportation (Medical): No     Lack of Transportation (Non-Medical): No   Housing Stability: Low Risk  (2024)    Housing Stability Vital Sign     Unable to Pay for Housing in the Last Year: No     Number of Places Lived in the Last Year: 1     Unstable Housing in the Last Year: No       Medications:   Medications:    ciprofloxacin  400 mg IntraVENous Once      Infusions:   PRN Meds:      Labs      CBC:   Recent Labs     24  1310   WBC 8.7

## 2024-04-23 ENCOUNTER — ANESTHESIA (OUTPATIENT)
Dept: OPERATING ROOM | Age: 49
End: 2024-04-23
Payer: MEDICARE

## 2024-04-23 ENCOUNTER — HOSPITAL ENCOUNTER (OUTPATIENT)
Dept: WOUND CARE | Age: 49
Discharge: HOME | End: 2024-04-23
Attending: STUDENT IN AN ORGANIZED HEALTH CARE EDUCATION/TRAINING PROGRAM

## 2024-04-23 LAB
ANION GAP SERPL CALCULATED.3IONS-SCNC: 12 MMOL/L (ref 7–16)
BASOPHILS ABSOLUTE: 0.1 K/CU MM
BASOPHILS RELATIVE PERCENT: 1.1 % (ref 0–1)
BUN SERPL-MCNC: 36 MG/DL (ref 6–23)
CALCIUM SERPL-MCNC: 8.3 MG/DL (ref 8.3–10.6)
CHLORIDE BLD-SCNC: 99 MMOL/L (ref 99–110)
CO2: 27 MMOL/L (ref 21–32)
CREAT SERPL-MCNC: 4.9 MG/DL (ref 0.9–1.3)
DIFFERENTIAL TYPE: ABNORMAL
EOSINOPHILS ABSOLUTE: 0.2 K/CU MM
EOSINOPHILS RELATIVE PERCENT: 4 % (ref 0–3)
GFR SERPL CREATININE-BSD FRML MDRD: 14 ML/MIN/1.73M2
GLUCOSE BLD-MCNC: 106 MG/DL (ref 70–99)
GLUCOSE BLD-MCNC: 125 MG/DL (ref 70–99)
GLUCOSE BLD-MCNC: 132 MG/DL (ref 70–99)
GLUCOSE BLD-MCNC: 148 MG/DL (ref 70–99)
GLUCOSE BLD-MCNC: 246 MG/DL (ref 70–99)
GLUCOSE SERPL-MCNC: 105 MG/DL (ref 70–99)
HCT VFR BLD CALC: 25.7 % (ref 42–52)
HEMOGLOBIN: 8.1 GM/DL (ref 13.5–18)
IMMATURE NEUTROPHIL %: 0.6 % (ref 0–0.43)
LYMPHOCYTES ABSOLUTE: 0.7 K/CU MM
LYMPHOCYTES RELATIVE PERCENT: 15.5 % (ref 24–44)
MCH RBC QN AUTO: 30.3 PG (ref 27–31)
MCHC RBC AUTO-ENTMCNC: 31.5 % (ref 32–36)
MCV RBC AUTO: 96.3 FL (ref 78–100)
MONOCYTES ABSOLUTE: 0.5 K/CU MM
MONOCYTES RELATIVE PERCENT: 11.5 % (ref 0–4)
NEUTROPHILS RELATIVE PERCENT: 67.3 % (ref 36–66)
NUCLEATED RBC %: 0 %
PDW BLD-RTO: 15.7 % (ref 11.7–14.9)
PLATELET # BLD: 161 K/CU MM (ref 140–440)
PMV BLD AUTO: 9.7 FL (ref 7.5–11.1)
POTASSIUM SERPL-SCNC: 4.4 MMOL/L (ref 3.5–5.1)
RBC # BLD: 2.67 M/CU MM (ref 4.6–6.2)
SEGMENTED NEUTROPHILS ABSOLUTE COUNT: 3.2 K/CU MM
SODIUM BLD-SCNC: 138 MMOL/L (ref 135–145)
TOTAL IMMATURE NEUTOROPHIL: 0.03 K/CU MM
TOTAL NUCLEATED RBC: 0 K/CU MM
WBC # BLD: 4.7 K/CU MM (ref 4–10.5)

## 2024-04-23 PROCEDURE — 2700000000 HC OXYGEN THERAPY PER DAY

## 2024-04-23 PROCEDURE — 80048 BASIC METABOLIC PNL TOTAL CA: CPT

## 2024-04-23 PROCEDURE — 2500000003 HC RX 250 WO HCPCS: Performed by: INTERNAL MEDICINE

## 2024-04-23 PROCEDURE — 87077 CULTURE AEROBIC IDENTIFY: CPT

## 2024-04-23 PROCEDURE — 85025 COMPLETE CBC W/AUTO DIFF WBC: CPT

## 2024-04-23 PROCEDURE — 0Y6Y0Z0 DETACHMENT AT LEFT 5TH TOE, COMPLETE, OPEN APPROACH: ICD-10-PCS | Performed by: SURGERY

## 2024-04-23 PROCEDURE — 6360000002 HC RX W HCPCS: Performed by: STUDENT IN AN ORGANIZED HEALTH CARE EDUCATION/TRAINING PROGRAM

## 2024-04-23 PROCEDURE — 28810 AMPUTATION TOE & METATARSAL: CPT | Performed by: SURGERY

## 2024-04-23 PROCEDURE — 2709999900 HC NON-CHARGEABLE SUPPLY: Performed by: SURGERY

## 2024-04-23 PROCEDURE — 87205 SMEAR GRAM STAIN: CPT

## 2024-04-23 PROCEDURE — 7100000000 HC PACU RECOVERY - FIRST 15 MIN: Performed by: SURGERY

## 2024-04-23 PROCEDURE — 3700000001 HC ADD 15 MINUTES (ANESTHESIA): Performed by: SURGERY

## 2024-04-23 PROCEDURE — 6370000000 HC RX 637 (ALT 250 FOR IP): Performed by: STUDENT IN AN ORGANIZED HEALTH CARE EDUCATION/TRAINING PROGRAM

## 2024-04-23 PROCEDURE — 88305 TISSUE EXAM BY PATHOLOGIST: CPT

## 2024-04-23 PROCEDURE — 6360000002 HC RX W HCPCS: Performed by: FAMILY MEDICINE

## 2024-04-23 PROCEDURE — 1200000000 HC SEMI PRIVATE

## 2024-04-23 PROCEDURE — 2580000003 HC RX 258

## 2024-04-23 PROCEDURE — 7100000001 HC PACU RECOVERY - ADDTL 15 MIN: Performed by: SURGERY

## 2024-04-23 PROCEDURE — 0QBP0ZZ EXCISION OF LEFT METATARSAL, OPEN APPROACH: ICD-10-PCS | Performed by: SURGERY

## 2024-04-23 PROCEDURE — 87186 SC STD MICRODIL/AGAR DIL: CPT

## 2024-04-23 PROCEDURE — 6360000002 HC RX W HCPCS: Performed by: NURSE ANESTHETIST, CERTIFIED REGISTERED

## 2024-04-23 PROCEDURE — 94761 N-INVAS EAR/PLS OXIMETRY MLT: CPT

## 2024-04-23 PROCEDURE — 3600000012 HC SURGERY LEVEL 2 ADDTL 15MIN: Performed by: SURGERY

## 2024-04-23 PROCEDURE — 3600000002 HC SURGERY LEVEL 2 BASE: Performed by: SURGERY

## 2024-04-23 PROCEDURE — 87076 CULTURE ANAEROBE IDENT EACH: CPT

## 2024-04-23 PROCEDURE — 3700000000 HC ANESTHESIA ATTENDED CARE: Performed by: SURGERY

## 2024-04-23 PROCEDURE — 87070 CULTURE OTHR SPECIMN AEROBIC: CPT

## 2024-04-23 PROCEDURE — 6360000002 HC RX W HCPCS: Performed by: ANESTHESIOLOGY

## 2024-04-23 PROCEDURE — 2580000003 HC RX 258: Performed by: STUDENT IN AN ORGANIZED HEALTH CARE EDUCATION/TRAINING PROGRAM

## 2024-04-23 PROCEDURE — 88311 DECALCIFY TISSUE: CPT

## 2024-04-23 PROCEDURE — 6360000002 HC RX W HCPCS

## 2024-04-23 PROCEDURE — 87075 CULTR BACTERIA EXCEPT BLOOD: CPT

## 2024-04-23 PROCEDURE — 11046 DBRDMT MUSC&/FSCA EA ADDL: CPT | Performed by: SURGERY

## 2024-04-23 PROCEDURE — 82962 GLUCOSE BLOOD TEST: CPT

## 2024-04-23 PROCEDURE — 90935 HEMODIALYSIS ONE EVALUATION: CPT

## 2024-04-23 PROCEDURE — 88304 TISSUE EXAM BY PATHOLOGIST: CPT

## 2024-04-23 PROCEDURE — 11043 DBRDMT MUSC&/FSCA 1ST 20/<: CPT | Performed by: SURGERY

## 2024-04-23 RX ORDER — SODIUM CHLORIDE 0.9 % (FLUSH) 0.9 %
5-40 SYRINGE (ML) INJECTION EVERY 12 HOURS SCHEDULED
Status: DISCONTINUED | OUTPATIENT
Start: 2024-04-23 | End: 2024-04-23 | Stop reason: HOSPADM

## 2024-04-23 RX ORDER — NALOXONE HYDROCHLORIDE 0.4 MG/ML
INJECTION, SOLUTION INTRAMUSCULAR; INTRAVENOUS; SUBCUTANEOUS PRN
Status: DISCONTINUED | OUTPATIENT
Start: 2024-04-23 | End: 2024-04-23 | Stop reason: HOSPADM

## 2024-04-23 RX ORDER — PROCHLORPERAZINE EDISYLATE 5 MG/ML
5 INJECTION INTRAMUSCULAR; INTRAVENOUS EVERY 6 HOURS PRN
Status: DISCONTINUED | OUTPATIENT
Start: 2024-04-23 | End: 2024-04-27 | Stop reason: HOSPADM

## 2024-04-23 RX ORDER — SODIUM CHLORIDE 9 MG/ML
INJECTION, SOLUTION INTRAVENOUS CONTINUOUS PRN
Status: DISCONTINUED | OUTPATIENT
Start: 2024-04-23 | End: 2024-04-23 | Stop reason: SDUPTHER

## 2024-04-23 RX ORDER — CEFAZOLIN SODIUM 1 G/3ML
INJECTION, POWDER, FOR SOLUTION INTRAMUSCULAR; INTRAVENOUS PRN
Status: DISCONTINUED | OUTPATIENT
Start: 2024-04-23 | End: 2024-04-23 | Stop reason: SDUPTHER

## 2024-04-23 RX ORDER — FENTANYL CITRATE 50 UG/ML
INJECTION, SOLUTION INTRAMUSCULAR; INTRAVENOUS PRN
Status: DISCONTINUED | OUTPATIENT
Start: 2024-04-23 | End: 2024-04-23 | Stop reason: SDUPTHER

## 2024-04-23 RX ORDER — DEXAMETHASONE SODIUM PHOSPHATE 4 MG/ML
INJECTION, SOLUTION INTRA-ARTICULAR; INTRALESIONAL; INTRAMUSCULAR; INTRAVENOUS; SOFT TISSUE PRN
Status: DISCONTINUED | OUTPATIENT
Start: 2024-04-23 | End: 2024-04-23 | Stop reason: SDUPTHER

## 2024-04-23 RX ORDER — PROPOFOL 10 MG/ML
INJECTION, EMULSION INTRAVENOUS PRN
Status: DISCONTINUED | OUTPATIENT
Start: 2024-04-23 | End: 2024-04-23 | Stop reason: SDUPTHER

## 2024-04-23 RX ORDER — SODIUM CHLORIDE 9 MG/ML
INJECTION, SOLUTION INTRAVENOUS PRN
Status: DISCONTINUED | OUTPATIENT
Start: 2024-04-23 | End: 2024-04-23 | Stop reason: HOSPADM

## 2024-04-23 RX ORDER — FENTANYL CITRATE 50 UG/ML
50 INJECTION, SOLUTION INTRAMUSCULAR; INTRAVENOUS EVERY 5 MIN PRN
Status: DISCONTINUED | OUTPATIENT
Start: 2024-04-23 | End: 2024-04-23 | Stop reason: HOSPADM

## 2024-04-23 RX ORDER — PHENYLEPHRINE HCL IN 0.9% NACL 1 MG/10 ML
SYRINGE (ML) INTRAVENOUS PRN
Status: DISCONTINUED | OUTPATIENT
Start: 2024-04-23 | End: 2024-04-23 | Stop reason: SDUPTHER

## 2024-04-23 RX ORDER — LIDOCAINE HYDROCHLORIDE 20 MG/ML
INJECTION, SOLUTION INTRAVENOUS PRN
Status: DISCONTINUED | OUTPATIENT
Start: 2024-04-23 | End: 2024-04-23 | Stop reason: SDUPTHER

## 2024-04-23 RX ORDER — ONDANSETRON 2 MG/ML
4 INJECTION INTRAMUSCULAR; INTRAVENOUS
Status: DISCONTINUED | OUTPATIENT
Start: 2024-04-23 | End: 2024-04-23 | Stop reason: HOSPADM

## 2024-04-23 RX ORDER — ONDANSETRON 2 MG/ML
INJECTION INTRAMUSCULAR; INTRAVENOUS PRN
Status: DISCONTINUED | OUTPATIENT
Start: 2024-04-23 | End: 2024-04-23 | Stop reason: SDUPTHER

## 2024-04-23 RX ORDER — SODIUM CHLORIDE 0.9 % (FLUSH) 0.9 %
5-40 SYRINGE (ML) INJECTION PRN
Status: DISCONTINUED | OUTPATIENT
Start: 2024-04-23 | End: 2024-04-23 | Stop reason: HOSPADM

## 2024-04-23 RX ADMIN — HEPARIN SODIUM 5000 UNITS: 5000 INJECTION INTRAVENOUS; SUBCUTANEOUS at 15:40

## 2024-04-23 RX ADMIN — AMLODIPINE BESYLATE 5 MG: 5 TABLET ORAL at 11:40

## 2024-04-23 RX ADMIN — CALCIUM ACETATE 1334 MG: 667 CAPSULE ORAL at 11:40

## 2024-04-23 RX ADMIN — SODIUM CHLORIDE, PRESERVATIVE FREE 10 ML: 5 INJECTION INTRAVENOUS at 20:08

## 2024-04-23 RX ADMIN — DEXAMETHASONE SODIUM PHOSPHATE 4 MG: 4 INJECTION, SOLUTION INTRAMUSCULAR; INTRAVENOUS at 13:09

## 2024-04-23 RX ADMIN — CIPROFLOXACIN 400 MG: 2 INJECTION, SOLUTION INTRAVENOUS at 15:48

## 2024-04-23 RX ADMIN — CALCITRIOL CAPSULES 0.25 MCG 0.5 MCG: 0.25 CAPSULE ORAL at 20:09

## 2024-04-23 RX ADMIN — FENTANYL CITRATE 50 MCG: 50 INJECTION INTRAMUSCULAR; INTRAVENOUS at 14:43

## 2024-04-23 RX ADMIN — INSULIN GLARGINE 15 UNITS: 100 INJECTION, SOLUTION SUBCUTANEOUS at 20:09

## 2024-04-23 RX ADMIN — PROCHLORPERAZINE EDISYLATE 5 MG: 5 INJECTION INTRAMUSCULAR; INTRAVENOUS at 17:32

## 2024-04-23 RX ADMIN — Medication 200 MCG: at 13:58

## 2024-04-23 RX ADMIN — FENTANYL CITRATE 100 MCG: 50 INJECTION, SOLUTION INTRAMUSCULAR; INTRAVENOUS at 13:09

## 2024-04-23 RX ADMIN — HEPARIN SODIUM 5000 UNITS: 5000 INJECTION INTRAVENOUS; SUBCUTANEOUS at 20:09

## 2024-04-23 RX ADMIN — HYDROMORPHONE HYDROCHLORIDE 0.25 MG: 1 INJECTION, SOLUTION INTRAMUSCULAR; INTRAVENOUS; SUBCUTANEOUS at 05:39

## 2024-04-23 RX ADMIN — HYDRALAZINE HYDROCHLORIDE 25 MG: 25 TABLET ORAL at 20:10

## 2024-04-23 RX ADMIN — PROPOFOL 200 MG: 10 INJECTION, EMULSION INTRAVENOUS at 13:09

## 2024-04-23 RX ADMIN — LIDOCAINE HYDROCHLORIDE 50 MG: 20 INJECTION, SOLUTION INTRAVENOUS at 13:09

## 2024-04-23 RX ADMIN — Medication 100 MCG: at 13:50

## 2024-04-23 RX ADMIN — OXYCODONE AND ACETAMINOPHEN 1 TABLET: 7.5; 325 TABLET ORAL at 21:38

## 2024-04-23 RX ADMIN — Medication 100 MCG: at 13:52

## 2024-04-23 RX ADMIN — ONDANSETRON 4 MG: 2 INJECTION INTRAMUSCULAR; INTRAVENOUS at 13:09

## 2024-04-23 RX ADMIN — FENTANYL CITRATE 50 MCG: 50 INJECTION INTRAMUSCULAR; INTRAVENOUS at 14:51

## 2024-04-23 RX ADMIN — SODIUM CHLORIDE: 9 INJECTION, SOLUTION INTRAVENOUS at 15:44

## 2024-04-23 RX ADMIN — ATORVASTATIN CALCIUM 40 MG: 40 TABLET, FILM COATED ORAL at 20:09

## 2024-04-23 RX ADMIN — HYDRALAZINE HYDROCHLORIDE 25 MG: 25 TABLET ORAL at 15:38

## 2024-04-23 RX ADMIN — OXYCODONE AND ACETAMINOPHEN 1 TABLET: 7.5; 325 TABLET ORAL at 15:38

## 2024-04-23 RX ADMIN — CALCIUM ACETATE 1334 MG: 667 CAPSULE ORAL at 15:38

## 2024-04-23 RX ADMIN — CEFAZOLIN 3 G: 1 INJECTION, POWDER, FOR SOLUTION INTRAMUSCULAR; INTRAVENOUS; PARENTERAL at 13:35

## 2024-04-23 RX ADMIN — SODIUM CHLORIDE, PRESERVATIVE FREE 10 ML: 5 INJECTION INTRAVENOUS at 05:40

## 2024-04-23 RX ADMIN — SODIUM CHLORIDE: 9 INJECTION, SOLUTION INTRAVENOUS at 13:05

## 2024-04-23 ASSESSMENT — PAIN DESCRIPTION - PAIN TYPE: TYPE: CHRONIC PAIN

## 2024-04-23 ASSESSMENT — PAIN DESCRIPTION - DESCRIPTORS
DESCRIPTORS: ACHING;DISCOMFORT
DESCRIPTORS: SHARP
DESCRIPTORS: ACHING
DESCRIPTORS: SHARP
DESCRIPTORS: ACHING;BURNING

## 2024-04-23 ASSESSMENT — PAIN DESCRIPTION - ONSET
ONSET: ON-GOING
ONSET: ON-GOING

## 2024-04-23 ASSESSMENT — PAIN - FUNCTIONAL ASSESSMENT
PAIN_FUNCTIONAL_ASSESSMENT: ACTIVITIES ARE NOT PREVENTED
PAIN_FUNCTIONAL_ASSESSMENT: 0-10
PAIN_FUNCTIONAL_ASSESSMENT: ACTIVITIES ARE NOT PREVENTED

## 2024-04-23 ASSESSMENT — PAIN DESCRIPTION - ORIENTATION
ORIENTATION: LEFT
ORIENTATION: LEFT
ORIENTATION: LEFT;MID;LOWER
ORIENTATION: LEFT

## 2024-04-23 ASSESSMENT — PAIN DESCRIPTION - LOCATION
LOCATION: FOOT
LOCATION: FOOT
LOCATION: BACK;FOOT
LOCATION: FOOT

## 2024-04-23 ASSESSMENT — PAIN SCALES - GENERAL
PAINLEVEL_OUTOF10: 8
PAINLEVEL_OUTOF10: 7
PAINLEVEL_OUTOF10: 6
PAINLEVEL_OUTOF10: 8

## 2024-04-23 ASSESSMENT — PAIN DESCRIPTION - FREQUENCY
FREQUENCY: CONTINUOUS
FREQUENCY: CONTINUOUS

## 2024-04-23 NOTE — PROGRESS NOTES
Patient Name: Yovanny Levine  Patient : 1975  MRN: [id number]     Acct: [id number]  Date of Admission: 2024  Room/Bed: 1103/1103-A  Code Status:  Full Code  Allergies:   Allergies   Allergen Reactions    Adhesive Tape Rash    Doxycycline Nausea And Vomiting    Reglan [Metoclopramide] Anxiety     Diagnosis:    Patient Active Problem List   Diagnosis    Hiatal hernia    Diabetes mellitus type 2, improved controlled    Diabetic neuropathy (HCC)    Panic attack    Anxiety associated with depression    Neck pain    DANIELA (obstructive sleep apnea)    Urinary frequency    Bilateral carpal tunnel syndrome- left worse than right    Hyperlipidemia with target LDL less than 100    Essential hypertension    Bronchitis    Osteomyelitis (HCC)    Abscess    Periumbilical hernia    Sepsis (HCC)    Cellulitis of left foot    Constipation    Intractable nausea and vomiting    Uncontrolled type 2 diabetes mellitus    Nausea and vomiting    Diabetic foot infection (HCC)    Acute renal failure (ARF) (HCC)    Cellulitis    Cellulitis of left arm    Cellulitis, scrotum    Acute epididymitis    Irene gangrene    Recurrent major depressive disorder, in full remission (HCC)    Generalized anxiety disorder    Morbid obesity with body mass index (BMI) of 40.0 to 44.9 in adult (HCC)    Necrotizing fasciitis (HCC)    Syncope    Right groin wound    Ulcer of right groin with fat layer exposed (HCC)    Diabetic ulcer of left midfoot associated with type 2 diabetes mellitus (HCC)    Nephrotic syndrome    Generalized edema    Stage 3b chronic kidney disease (HCC)    Diabetic nephropathy associated with type 2 diabetes mellitus (HCC)    Hypoalbuminemia    Chronic kidney disease, stage IV (severe) (HCC)    FH: CAD (coronary artery disease)    ASCVD (arteriosclerotic cardiovascular disease)    Other proteinuria    Chest pain    Surgical wound dehiscence    CARMEN (acute kidney injury) (HCC)    Anemia    Chest tightness    NSTEMI (non-ST  14  Machine Ph: 7.2  Bicarbonate Concentrate Lot No.: [id number]  Acid Concentrate Lot No.: [id number]  Manual Ph: 7.2  Bleach Test (Neg): Yes  Bath Temperature: 96.8 °F (36 °C)  Tubing Lot#: [id number]  Conductivity Meter Serial #: [id number]  All Connections Secure?: Yes  Venous Parameters Set?: Yes  Arterial Parameters Set?: Yes  Saline Line Double Clamped?: Yes  Air Foam Detector Engaged?: Yes  Machine Functioning Alarm Free? Yes  Prime Given: 200ml    Chlorine Testing - Before each treatment and every 4 hours:   Treatment  Treatment Number: 1  Time On: 0745  Time Off: 1053  Treatment Goal: 3 HOUR, 2.7L  Weight - Scale: 123 kg (271 lb 2.7 oz) (04/23/24 0543)  1st check: less than 0.1 ppm at: 0700 hours  2nd check: less than 0.1 ppm at: 1025 hours  3rd check: Not Applicable  (if greater than 0.1 ppm, then check every 30 minutes from secondary)    Access Flows and Pressures  Patient Vitals for the past 8 hrs:   Blood Flow Rate (ml/min) Ultrafiltration Rate (ml/hr) Ultrafiltration Removed (ml) Arterial Pressure (mmHg) Venous Pressure (mmHg) TMP DFR Comments Access Visible   04/23/24 0745 350 ml/min 1170 ml/hr 0 ml -50 mmHg 90 60 700 tx started Yes   04/23/24 0800 350 ml/min 1170 ml/hr 307 ml -60 mmHg 100 60 700 pt resting Yes   04/23/24 0815 350 ml/min 1170 ml/hr 753 ml -70 mmHg 100 60 700 resting with eyes closed Yes   04/23/24 0830 350 ml/min 1170 ml/hr 861 ml -70 mmHg 100 60 700 Dr. leon at bedside Yes   04/23/24 0845 350 ml/min 1170 ml/hr 1175 ml -80 mmHg 250 110 700 no needs Yes   04/23/24 0900 350 ml/min 1170 ml/hr 1380 ml -90 mmHg 140 100 700 resting Yes   04/23/24 0915 350 ml/min 1170 ml/hr 1723 ml -90 mmHg 150 100 700 resting Yes   04/23/24 0930 350 ml/min 1170 ml/hr 1982 ml -100 mmHg 140 100 700 no distress Yes   04/23/24 0944 350 ml/min 1170 ml/hr 2226 ml -100 mmHg 140 100 700 no changes Yes   04/23/24 1000 350 ml/min 1170 ml/hr 2588 ml -100 mmHg 140 100 700 resting with eyes closed Yes   04/23/24 1001 350 ml/min

## 2024-04-23 NOTE — CARE COORDINATION
04/23/24 1125   Service Assessment   Patient Orientation Alert and Oriented   Cognition Alert   History Provided By Patient;Medical Record;Physician   Primary Caregiver Self   Support Systems Friends/Neighbors   Patient's Healthcare Decision Maker is: Legal Next of Kin   PCP Verified by CM Yes   Last Visit to PCP Within last 3 months   Prior Functional Level Independent in ADLs/IADLs   Current Functional Level Independent in ADLs/IADLs   Can patient return to prior living arrangement Yes   Ability to make needs known: Good   Family able to assist with home care needs: No   Would you like for me to discuss the discharge plan with any other family members/significant others, and if so, who? No   Financial Resources Medicaid;Medicare   Community Resources ECF/Home Care  ([facility])     Discharge planning initiated. From home, independent. Active with [facility]. Home wound vac. Plan is home with [facility]. CM following.

## 2024-04-23 NOTE — ANESTHESIA POSTPROCEDURE EVALUATION
Department of Anesthesiology  Postprocedure Note    Patient: Yovanny Levine  MRN: [id number]  YOB: 1975  Date of evaluation: 4/23/2024    Procedure Summary       Date: 04/23/24 Room / Location: [address]    Anesthesia Start: 1305 Anesthesia Stop: 1440    Procedures:       FOOT DEBRIDEMENT INCISION AND DRAINAGE (Left)      left 5th TOE AMPUTATION (Left) Diagnosis:       Infection      (Infection [B99.9])    Surgeons: Claudy Forrest DO Responsible Provider: Hipolito Villa MD    Anesthesia Type: MAC, general ASA Status: 4            Anesthesia Type: No value filed.    Marcus Phase I: Marcus Score: 8    Marcus Phase II:      Anesthesia Post Evaluation    Patient location during evaluation: bedside  Patient participation: complete - patient participated  Level of consciousness: awake  Pain score: 0  Airway patency: patent  Nausea & Vomiting: no nausea and no vomiting  Cardiovascular status: hemodynamically stable  Respiratory status: acceptable  Hydration status: euvolemic  Pain management: adequate    No notable events documented.

## 2024-04-23 NOTE — PROGRESS NOTES
Nephrology  Dialysis Note        [address] [facility], [address] [place], [place] [address]  Phone: [phone]  Office Hours: 8:30AM - 4:30PM  Monday - Friday          PROCEDURE:  Patient seen during hemodialysis      PHYSICIAN:  ANTHONY      INDICATION:  End-stage renal disease      RX:  See dialysis flowsheet for specifics on access, blood flow rate, dialysate baths, duration of dialysis, anticoagulation and other technical information.      COMMENTS:  UF TODAY, HE WILL LOSE AROUND 2-3KG TODAY IF BP ALLOWS        Electronically signed by Melody James DO on 4/23/2024 at 8:18 AM    [facility]  MD SAMANTHA SMITH DO  [address] [facility],  [address] [place] [address]  PHONE: [phone]  FAX: [phone]

## 2024-04-23 NOTE — OP NOTE
Operative Note      Patient: Yovanny Levine  YOB: 1975  MRN: [id number]    Date of Procedure: 4/23/2024    Pre-Op Diagnosis Codes:     Diabetic foot ulcer with infection.  Osteomyelitis.    Post-Op Diagnosis: Same       Procedure(s):  Left fifth toe metatarsal amputation  Excisional debridement of diabetic foot ulcer.    Surgeon(s):  Claudy Forrest DO    Assistant:   First Assistant: Danelle Stephens    Anesthesia: Choice    Estimated Blood Loss (mL): less than 50     Complications: None    Specimens:   ID Type Source Tests Collected by Time Destination   1 : Left foot tissue culture Tissue Tissue CULTURE, TISSUE Claudy Forrest,  4/23/2024 1408    A : Left 5th metatarsal Tissue Tissue SURGICAL PATHOLOGY Claudy Forrest DO 4/23/2024 1408    B : Left 5th toe Specimen Toe SURGICAL PATHOLOGY Claudy Forrest,  4/23/2024 1424        Implants:  * No implants in log *      Drains:   Negative Pressure Wound Therapy (Active)   Wound Type Surgical 04/22/24 1646   Unit Type kci 04/22/24 1646   Dressing Type Black Foam 04/22/24 1646   Number of pieces used 3 04/22/24 1646   Number of pieces removed 2 04/08/24 1422   Cycle Continuous 04/22/24 1646   Target Pressure (mmHg) 125 04/22/24 1646   Canister changed? No 04/22/24 1646   Dressing Status Dry 04/22/24 1646   Dressing Changed Other (Comment) 04/22/24 1646   Drainage Amount Moderate 04/22/24 1646   Drainage Description Serosanguinous 04/22/24 1646   Dressing Change Due 04/12/24 04/22/24 1646   Wound Assessment Pale granulation tissue;Slough 04/22/24 1646   Shazia-wound Assessment Maceration 04/22/24 1646   Odor None 04/22/24 1646       Negative Pressure Wound Therapy Left;Anterior (Active)       Findings:  Infection Present At Time Of Surgery (PATOS) (choose all levels that have infection present):  - Superficial Infection (skin/subcutaneous) present as evidenced by abscess and purulent fluid  - Deep Infection (muscle/fascia) present as evidenced by

## 2024-04-23 NOTE — PROGRESS NOTES
V2.0    [facility] [place] [facility] Progress Note      Name:  Yovanny Levine /Age/Sex: 1975  (48 y.o. male)   MRN & CSN:  [id number] & [id number] Encounter Date/Time: 2024 12:36 PM EDT   Location:  OR/NONE PCP: José Luis Izquierdo MD     Attending:Eduard Medina MD       Hospital Day: 2    Assessment and Recommendations   Yovanny Levine is a 48 y.o. male who presents with Osteomyelitis (HCC)      Plan:     Left foot osteomyelitis  -Has been on ciprofloxacin since discharge last time based on prior cultures.  Wound now getting worse.  .  Continue ciprofloxacin for now and await further surgical cultures as he has not had any systemic signs such as leukocytosis or fevers that are concerning for resistance.  -General surgery consulted.  Plan for surgical exploration and debridement today.  -Follow-up on results of blood culture.     ESRD on dialysis  -Nephrology consulted.  Plan for dialysis today     Essential hypertension  -Continue home amlodipine, hydralazine.     CAD status post stents  -Continue home statin.  He is not on any antiplatelet agents at home.  Recommend cardiology follow-up outpatient.     Chronic anemia  -Likely secondary to ESRD.  No signs of blood loss.  Monitor daily CBCs.     Type 2 diabetes mellitus  -Resume home regimen of basal and bolus insulin.  Add on medium dose sliding scale.  Blood glucose checks before every meal and at bedtime.  Hypoglycemia protocol ordered      Diet Diet NPO   DVT Prophylaxis [] Lovenox, [x]  Heparin, [] SCDs, [] Ambulation,  [] Eliquis, [] Xarelto  [] Coumadin   Code Status Full Code   Disposition From: Home  Expected Disposition: Home  Estimated Date of Discharge: 2 to 3 days  Patient requires continued admission due to surgical exploration of the wound   Surrogate Decision Maker/ Yovanny Valadez (Child)        Personally reviewed Lab Studies and Imaging     Imaging that was interpreted personally includes left foot x-ray and results bony erosion consistent with  osteomyelitis.     Drugs that require monitoring for toxicity include heparin and the method of monitoring was CBC      Subjective:     Chief Complaint:   Chief Complaint   Patient presents with    Wound Check     Left wound check        Seen during dialysis.  Reports he has okay with getting the surgical procedure today.  Denies any chest pain, nausea, vomiting shortness of breath.  Reports he did not eat overnight and anticipation of surgery.  Review of Systems:      Pertinent positives and negatives discussed in HPI    Objective:     Intake/Output Summary (Last 24 hours) at 4/23/2024 1236  Last data filed at 4/23/2024 1053  Gross per 24 hour   Intake 1000 ml   Output 7000 ml   Net -6000 ml      Vitals:   Vitals:    04/23/24 1047 04/23/24 1053 04/23/24 1136 04/23/24 1154   BP: (!) 159/92 (!) 159/92 (!) 184/93 (!) 179/97   Pulse: 78 79 82 84   Resp:   18 16   Temp:  97.2 °F (36.2 °C) 97.7 °F (36.5 °C) 97.8 °F (36.6 °C)   TempSrc:   Oral Temporal   SpO2:    97%   Weight:       Height:             Physical Exam:      General appearance: alert and cooperative with exam  Lungs: Not in respiratory distress.  Able to speak full sentences.  Lungs clear to auscultation.  Heart: regular rate and rhythm, S1, S2 normal, no murmur, click, rub or gallop  Abdomen: soft, non-tender; bowel sounds normal; no masses,  no organomegaly  Extremities: Extremities are not cyanotic.  Left foot covered with bandage.  Bandage noted to be soaked with serosanguineous fluid noted.  Neurologic: Alert and oriented to time place and person.  Strength 5 out of 5 in bilateral upper and lower extremities.  Sensation to light touch is intact and equal in bilateral upper and lower extremities.        Medications:   Medications:    [START ON 4/24/2024] epoetin paola-epbx  10,000 Units IntraVENous Once per day on Mon Wed Fri    ciprofloxacin  400 mg IntraVENous Once    amLODIPine  5 mg Oral Daily    atorvastatin  40 mg Oral Nightly    calcitRIOL  0.5 mcg

## 2024-04-24 LAB
ANION GAP SERPL CALCULATED.3IONS-SCNC: 11 MMOL/L (ref 7–16)
BASOPHILS ABSOLUTE: 0 K/CU MM
BASOPHILS RELATIVE PERCENT: 0.5 % (ref 0–1)
BUN SERPL-MCNC: 49 MG/DL (ref 6–23)
CALCIUM SERPL-MCNC: 7.9 MG/DL (ref 8.3–10.6)
CHLORIDE BLD-SCNC: 98 MMOL/L (ref 99–110)
CO2: 29 MMOL/L (ref 21–32)
CREAT SERPL-MCNC: 6 MG/DL (ref 0.9–1.3)
DIFFERENTIAL TYPE: ABNORMAL
EOSINOPHILS ABSOLUTE: 0 K/CU MM
EOSINOPHILS RELATIVE PERCENT: 0.4 % (ref 0–3)
GFR SERPL CREATININE-BSD FRML MDRD: 11 ML/MIN/1.73M2
GLUCOSE BLD-MCNC: 128 MG/DL (ref 70–99)
GLUCOSE BLD-MCNC: 129 MG/DL (ref 70–99)
GLUCOSE BLD-MCNC: 186 MG/DL (ref 70–99)
GLUCOSE BLD-MCNC: 192 MG/DL (ref 70–99)
GLUCOSE SERPL-MCNC: 103 MG/DL (ref 70–99)
HCT VFR BLD CALC: 26.2 % (ref 42–52)
HEMOGLOBIN: 8.1 GM/DL (ref 13.5–18)
IMMATURE NEUTROPHIL %: 0.4 % (ref 0–0.43)
LYMPHOCYTES ABSOLUTE: 0.5 K/CU MM
LYMPHOCYTES RELATIVE PERCENT: 7 % (ref 24–44)
MCH RBC QN AUTO: 30 PG (ref 27–31)
MCHC RBC AUTO-ENTMCNC: 30.9 % (ref 32–36)
MCV RBC AUTO: 97 FL (ref 78–100)
MONOCYTES ABSOLUTE: 0.8 K/CU MM
MONOCYTES RELATIVE PERCENT: 10.3 % (ref 0–4)
NEUTROPHILS RELATIVE PERCENT: 81.4 % (ref 36–66)
NUCLEATED RBC %: 0 %
PDW BLD-RTO: 15.2 % (ref 11.7–14.9)
PLATELET # BLD: 159 K/CU MM (ref 140–440)
PMV BLD AUTO: 10.1 FL (ref 7.5–11.1)
POTASSIUM SERPL-SCNC: 5.7 MMOL/L (ref 3.5–5.1)
RBC # BLD: 2.7 M/CU MM (ref 4.6–6.2)
SEGMENTED NEUTROPHILS ABSOLUTE COUNT: 6.1 K/CU MM
SODIUM BLD-SCNC: 138 MMOL/L (ref 135–145)
TOTAL IMMATURE NEUTOROPHIL: 0.03 K/CU MM
TOTAL NUCLEATED RBC: 0 K/CU MM
WBC # BLD: 7.5 K/CU MM (ref 4–10.5)

## 2024-04-24 PROCEDURE — 2500000003 HC RX 250 WO HCPCS: Performed by: INTERNAL MEDICINE

## 2024-04-24 PROCEDURE — 82962 GLUCOSE BLOOD TEST: CPT

## 2024-04-24 PROCEDURE — 6370000000 HC RX 637 (ALT 250 FOR IP): Performed by: INTERNAL MEDICINE

## 2024-04-24 PROCEDURE — 90935 HEMODIALYSIS ONE EVALUATION: CPT

## 2024-04-24 PROCEDURE — 6360000002 HC RX W HCPCS: Performed by: STUDENT IN AN ORGANIZED HEALTH CARE EDUCATION/TRAINING PROGRAM

## 2024-04-24 PROCEDURE — 85025 COMPLETE CBC W/AUTO DIFF WBC: CPT

## 2024-04-24 PROCEDURE — 36415 COLL VENOUS BLD VENIPUNCTURE: CPT

## 2024-04-24 PROCEDURE — 1200000000 HC SEMI PRIVATE

## 2024-04-24 PROCEDURE — 2700000000 HC OXYGEN THERAPY PER DAY

## 2024-04-24 PROCEDURE — 2580000003 HC RX 258: Performed by: STUDENT IN AN ORGANIZED HEALTH CARE EDUCATION/TRAINING PROGRAM

## 2024-04-24 PROCEDURE — 80048 BASIC METABOLIC PNL TOTAL CA: CPT

## 2024-04-24 PROCEDURE — 6370000000 HC RX 637 (ALT 250 FOR IP): Performed by: STUDENT IN AN ORGANIZED HEALTH CARE EDUCATION/TRAINING PROGRAM

## 2024-04-24 PROCEDURE — 6360000002 HC RX W HCPCS: Performed by: INTERNAL MEDICINE

## 2024-04-24 PROCEDURE — 94761 N-INVAS EAR/PLS OXIMETRY MLT: CPT

## 2024-04-24 RX ADMIN — OXYCODONE AND ACETAMINOPHEN 1 TABLET: 7.5; 325 TABLET ORAL at 11:47

## 2024-04-24 RX ADMIN — CALCITRIOL CAPSULES 0.25 MCG 0.5 MCG: 0.25 CAPSULE ORAL at 11:37

## 2024-04-24 RX ADMIN — CALCIUM ACETATE 1334 MG: 667 CAPSULE ORAL at 16:42

## 2024-04-24 RX ADMIN — SODIUM CHLORIDE: 9 INJECTION, SOLUTION INTRAVENOUS at 14:26

## 2024-04-24 RX ADMIN — ATORVASTATIN CALCIUM 40 MG: 40 TABLET, FILM COATED ORAL at 21:53

## 2024-04-24 RX ADMIN — INSULIN LISPRO 10 UNITS: 100 INJECTION, SOLUTION INTRAVENOUS; SUBCUTANEOUS at 16:41

## 2024-04-24 RX ADMIN — HEPARIN SODIUM 5000 UNITS: 5000 INJECTION INTRAVENOUS; SUBCUTANEOUS at 05:53

## 2024-04-24 RX ADMIN — HYDRALAZINE HYDROCHLORIDE 25 MG: 25 TABLET ORAL at 14:29

## 2024-04-24 RX ADMIN — INSULIN GLARGINE 15 UNITS: 100 INJECTION, SOLUTION SUBCUTANEOUS at 21:53

## 2024-04-24 RX ADMIN — CALCIUM ACETATE 1334 MG: 667 CAPSULE ORAL at 11:40

## 2024-04-24 RX ADMIN — EPOETIN ALFA-EPBX 10000 UNITS: 10000 INJECTION, SOLUTION INTRAVENOUS; SUBCUTANEOUS at 10:00

## 2024-04-24 RX ADMIN — AMLODIPINE BESYLATE 5 MG: 5 TABLET ORAL at 11:38

## 2024-04-24 RX ADMIN — INSULIN LISPRO 10 UNITS: 100 INJECTION, SOLUTION INTRAVENOUS; SUBCUTANEOUS at 11:36

## 2024-04-24 RX ADMIN — HEPARIN SODIUM 5000 UNITS: 5000 INJECTION INTRAVENOUS; SUBCUTANEOUS at 21:53

## 2024-04-24 RX ADMIN — OXYCODONE AND ACETAMINOPHEN 1 TABLET: 7.5; 325 TABLET ORAL at 18:16

## 2024-04-24 RX ADMIN — SODIUM CHLORIDE, PRESERVATIVE FREE 10 ML: 5 INJECTION INTRAVENOUS at 21:53

## 2024-04-24 RX ADMIN — SODIUM CHLORIDE, PRESERVATIVE FREE 10 ML: 5 INJECTION INTRAVENOUS at 11:41

## 2024-04-24 RX ADMIN — Medication 2000 UNITS: at 11:47

## 2024-04-24 RX ADMIN — HYDRALAZINE HYDROCHLORIDE 25 MG: 25 TABLET ORAL at 21:53

## 2024-04-24 RX ADMIN — CALCITRIOL CAPSULES 0.25 MCG 0.5 MCG: 0.25 CAPSULE ORAL at 21:53

## 2024-04-24 RX ADMIN — HEPARIN SODIUM 5000 UNITS: 5000 INJECTION INTRAVENOUS; SUBCUTANEOUS at 14:28

## 2024-04-24 RX ADMIN — OXYCODONE AND ACETAMINOPHEN 1 TABLET: 7.5; 325 TABLET ORAL at 05:52

## 2024-04-24 RX ADMIN — HYDRALAZINE HYDROCHLORIDE 25 MG: 25 TABLET ORAL at 05:53

## 2024-04-24 RX ADMIN — CIPROFLOXACIN 400 MG: 2 INJECTION, SOLUTION INTRAVENOUS at 14:28

## 2024-04-24 ASSESSMENT — PAIN SCALES - WONG BAKER
WONGBAKER_NUMERICALRESPONSE: NO HURT
WONGBAKER_NUMERICALRESPONSE: NO HURT

## 2024-04-24 ASSESSMENT — PAIN DESCRIPTION - DESCRIPTORS
DESCRIPTORS: ACHING

## 2024-04-24 ASSESSMENT — PAIN DESCRIPTION - LOCATION
LOCATION: FOOT;BACK
LOCATION: FOOT

## 2024-04-24 ASSESSMENT — PAIN DESCRIPTION - ORIENTATION
ORIENTATION: MID
ORIENTATION: LEFT
ORIENTATION: LEFT
ORIENTATION: LOWER

## 2024-04-24 ASSESSMENT — PAIN DESCRIPTION - PAIN TYPE: TYPE: CHRONIC PAIN

## 2024-04-24 ASSESSMENT — PAIN SCALES - GENERAL
PAINLEVEL_OUTOF10: 7
PAINLEVEL_OUTOF10: 7
PAINLEVEL_OUTOF10: 0
PAINLEVEL_OUTOF10: 8
PAINLEVEL_OUTOF10: 7

## 2024-04-24 ASSESSMENT — PAIN - FUNCTIONAL ASSESSMENT
PAIN_FUNCTIONAL_ASSESSMENT: ACTIVITIES ARE NOT PREVENTED

## 2024-04-24 NOTE — PROGRESS NOTES
Nephrology  Dialysis Note        [address] [facility], [address] [place] [address]  Phone: [phone]  Office Hours: 8:30AM - 4:30PM  Monday - Friday          PROCEDURE:  Patient seen during hemodialysis      PHYSICIAN:  ANTHONY      INDICATION:  End-stage renal disease      RX:  See dialysis flowsheet for specifics on access, blood flow rate, dialysate baths, duration of dialysis, anticoagulation and other technical information.      COMMENTS:  CONTINUE HD  SET TO LOSE 3KG IF BP ALLOWS  KEEP ON LOW K DIET    Electronically signed by Melody James DO on 4/24/2024 at 8:40 AM    [facility]  MD SAMANTHA SMITH DO  [address] [facility],  [address] [place] [address]  PHONE: [phone]  FAX: [phone]

## 2024-04-24 NOTE — PROGRESS NOTES
V2.0    [facility] [place]      Name:  Yovanny Levine /Age/Sex: 1975  (48 y.o. male)   MRN & CSN:  [id number] & [id number] Encounter Date/Time: 2024 12:36 PM EDT   Location:  [address] [place] [address] PCP: José Luis Izquierdo MD     Attending:Eduard Medina MD       Hospital Day: 3    Assessment and Recommendations   Yovanny Levine is a 48 y.o. male who presents with Osteomyelitis (HCC)      Plan:     Left foot osteomyelitis  -Has been on ciprofloxacin since discharge last time based on prior cultures.  Wound now getting worse. .  Continue ciprofloxacin for now and await further surgical cultures as he has not had any systemic signs such as leukocytosis or fevers that are concerning for resistance.  -General surgery consulted.  Underwent surgical exploration and debridement on   -BC -ve. F/u surgical cultures.      ESRD on dialysis  -Nephrology consulted.  MWF dialysis as per nephrology.      Essential hypertension  -Continue home amlodipine, hydralazine.     CAD status post stents  -Continue home statin.  He is not on any antiplatelet agents at home.  Recommend cardiology follow-up outpatient.     Chronic anemia  -Likely secondary to ESRD.  No signs of blood loss.  Monitor daily CBCs.     Type 2 diabetes mellitus  -Resume home regimen of basal and bolus insulin.  Add on medium dose sliding scale.  Blood glucose checks before every meal and at bedtime.  Hypoglycemia protocol ordered      Diet ADULT DIET; Regular; 5 carb choices (75 gm/meal); Low Potassium (Less than 3000 mg/day); 1500 ml   DVT Prophylaxis [] Lovenox, [x]  Heparin, [] SCDs, [] Ambulation,  [] Eliquis, [] Xarelto  [] Coumadin   Code Status Full Code   Disposition From: Home  Expected Disposition: Home  Estimated Date of Discharge: 2 to 3 days  Patient requires continued admission due to surgical exploration of the wound   Surrogate Decision Maker/ Yovanny Valadez (Child)        Personally reviewed Lab Studies and Imaging     Imaging that was    Recent Labs     04/22/24  1310 04/23/24  0930 04/24/24  0350   WBC 8.7 4.7 7.5   HGB 8.5* 8.1* 8.1*    161 159       BMP:    Recent Labs     04/22/24  1310 04/23/24  0930 04/24/24  0350    138 138   K 5.0 4.4 5.7*    99 98*   CO2 23 27 29   BUN 65* 36* 49*   CREATININE 8.9* 4.9* 6.0*   GLUCOSE 181* 105* 103*       Hepatic:   Recent Labs     04/22/24  1310   AST 13*   ALT 9*   BILITOT 0.5   ALKPHOS 77       Lipids:   Lab Results   Component Value Date/Time    CHOL 166 09/22/2022 02:02 PM    CHOL 168 10/15/2013 10:30 AM    HDL 30 09/22/2022 02:02 PM    TRIG 254 09/22/2022 02:02 PM     Hemoglobin A1C:   Lab Results   Component Value Date/Time    LABA1C 5.5 10/11/2023 05:42 PM       Troponin:   Lab Results   Component Value Date/Time    TROPONINT 0.176 02/08/2023 05:45 AM    TROPONINT 0.183 02/07/2023 09:17 PM    TROPONINT 0.155 09/13/2022 11:49 AM     UA:  Lab Results   Component Value Date/Time    NITRU NEGATIVE 08/30/2023 02:00 AM    COLORU YELLOW 08/30/2023 02:00 AM    PHUR 6.5 06/20/2013 09:51 AM    WBCUA 2 08/30/2023 02:00 AM    RBCUA <1 08/30/2023 02:00 AM    MUCUS RARE 07/24/2022 11:40 PM    TRICHOMONAS NONE SEEN 08/30/2023 02:00 AM    BACTERIA NEGATIVE 08/30/2023 02:00 AM    CLARITYU CLEAR 08/30/2023 02:00 AM    SPECGRAV 1.010 08/30/2023 02:00 AM    LEUKOCYTESUR NEGATIVE 08/30/2023 02:00 AM    UROBILINOGEN 1.0 08/30/2023 02:00 AM    BILIRUBINUR NEGATIVE 08/30/2023 02:00 AM    BILIRUBINUR neg 06/20/2013 09:51 AM    BLOODU TRACE 08/30/2023 02:00 AM    GLUCOSEU 1,000 06/20/2013 09:51 AM    KETUA NEGATIVE 08/30/2023 02:00 AM    AMORPHOUS RARE 07/24/2022 11:40 PM       Organism:   Lab Results   Component Value Date/Time    ORG SAVANNESA 01/07/2019 12:08 AM         Electronically signed by Eduard Medina MD on 4/24/2024 at 1:37 PM

## 2024-04-24 NOTE — PLAN OF CARE
Problem: Safety - Adult  Goal: Free from fall injury  4/23/2024 1820 by Keyana Nichols LPN  Outcome: Progressing     Problem: ABCDS Injury Assessment  Goal: Absence of physical injury  4/23/2024 2249 by Yolis Kearns RN  Outcome: Progressing  4/23/2024 1820 by Keyana Nichols LPN  Outcome: Progressing     Problem: Pain  Goal: Verbalizes/displays adequate comfort level or baseline comfort level  4/23/2024 2249 by Yolis Kearns RN  Outcome: Progressing  4/23/2024 1820 by Keyana Nichols LPN  Outcome: Progressing     Problem: Chronic Conditions and Co-morbidities  Goal: Patient's chronic conditions and co-morbidity symptoms are monitored and maintained or improved  4/23/2024 2249 by Yolis Kearns RN  Outcome: Progressing  4/23/2024 1820 by Keyaan Nichols LPN  Outcome: Progressing

## 2024-04-24 NOTE — PROGRESS NOTES
Patient Name: Yovanny Levine  Patient : 1975  MRN: [id number]     Acct: [id number]  Date of Admission: 2024  Room/Bed: 1103/1103-A  Code Status:  Full Code  Allergies:   Allergies   Allergen Reactions    Adhesive Tape Rash    Doxycycline Nausea And Vomiting    Reglan [Metoclopramide] Anxiety     Diagnosis:    Patient Active Problem List   Diagnosis    Hiatal hernia    Diabetes mellitus type 2, improved controlled    Diabetic neuropathy (HCC)    Panic attack    Anxiety associated with depression    Neck pain    DANIELA (obstructive sleep apnea)    Urinary frequency    Bilateral carpal tunnel syndrome- left worse than right    Hyperlipidemia with target LDL less than 100    Essential hypertension    Bronchitis    Osteomyelitis (HCC)    Abscess    Periumbilical hernia    Sepsis (HCC)    Cellulitis of left foot    Constipation    Intractable nausea and vomiting    Uncontrolled type 2 diabetes mellitus    Nausea and vomiting    Diabetic foot infection (HCC)    Acute renal failure (ARF) (HCC)    Cellulitis    Cellulitis of left arm    Cellulitis, scrotum    Acute epididymitis    Irene gangrene    Recurrent major depressive disorder, in full remission (HCC)    Generalized anxiety disorder    Morbid obesity with body mass index (BMI) of 40.0 to 44.9 in adult (HCC)    Necrotizing fasciitis (HCC)    Syncope    Right groin wound    Ulcer of right groin with fat layer exposed (HCC)    Diabetic ulcer of left midfoot associated with type 2 diabetes mellitus (HCC)    Nephrotic syndrome    Generalized edema    Stage 3b chronic kidney disease (HCC)    Diabetic nephropathy associated with type 2 diabetes mellitus (HCC)    Hypoalbuminemia    Chronic kidney disease, stage IV (severe) (HCC)    FH: CAD (coronary artery disease)    ASCVD (arteriosclerotic cardiovascular disease)    Other proteinuria    Chest pain    Surgical wound dehiscence    CARMEN (acute kidney injury) (HCC)    Anemia    Chest tightness    NSTEMI (non-ST  elevated myocardial infarction) (HCC)    Diabetic foot ulcer with osteomyelitis (HCC)    ESRD (end stage renal disease) (HCC)    Problem with vascular access    Fluid overload    Acute on chronic respiratory failure with hypoxia (HCC)    CAD (coronary artery disease)    Volume overload    Gangrene (HCC)    Acute osteomyelitis of metatarsal bone of left foot (HCC)    Open wound of plantar aspect of left foot    Diabetic ulcer of left midfoot associated with diabetes mellitus due to underlying condition, with fat layer exposed (HCC)    Open wound of foot, left, sequela    Chronic foot pain, left    Absence of toe of left foot (HCC)    Hyperkalemia         Treatment:  Hemodilaysis 2:1  Priority: Routine  Location: Acute Room    Diabetic: Yes  NPO: No  Isolation Precautions: Contact     Consent for Treatment Verified: Yes  Blood Consent Verified: Not Applicable     Safety Verified: Identify (I), Consent (C), Equipment (E), HepB Status (B), Orders Complete (O), Access Verified (A), and Timeliness (T)  Time out performed prior to access at 0814 hours.    Report Received from Primary RN at 0720 hours.  Primary RN (First Initial, Last Name, Title): ELINA Shoemaker LPN  Incapacitated Nurse Education Completed: Yes     HBsAg ONLY:  Date Drawn: October 12, 2023       Results: Negative  HBsAb:  Date Drawn:  October 12, 2023       Results: Immune >10    Order  Dialysis Bath  K+ (Potassium): 2  Ca+ (Calcium):  (3.5)  Na+ (Sodium): 138  HCO3 (Bicarb): 35  Bicarbonate Concentrate Lot No.: [id number]  Acid Concentrate Lot No.: [id number]     Na+ Modeling: Not Applicable  Dialyzer: F180  Dialysate Temperature (C):  36  Blood Flow Rate (BFR):  350   Dialysate Flow Rate (DFR):   700        Access to be Utilized   Access: AVF  Location: Upper Extremity  Side: Left   Needle gauge:  16  + Bruit/Thrill: Yes    First Use X-ray Verified: Not Applicable  OK to use line order: Not Applicable    Site Assessment:  Signs and Symptoms of

## 2024-04-25 ENCOUNTER — APPOINTMENT (OUTPATIENT)
Dept: GENERAL RADIOLOGY | Age: 49
DRG: 617 | End: 2024-04-25
Payer: MEDICARE

## 2024-04-25 PROBLEM — N18.6 ESRD (END STAGE RENAL DISEASE) ON DIALYSIS (HCC): Status: ACTIVE | Noted: 2024-04-25

## 2024-04-25 PROBLEM — Z99.2 ESRD (END STAGE RENAL DISEASE) ON DIALYSIS (HCC): Status: ACTIVE | Noted: 2024-04-25

## 2024-04-25 LAB
ANION GAP SERPL CALCULATED.3IONS-SCNC: 13 MMOL/L (ref 7–16)
BASOPHILS ABSOLUTE: 0 K/CU MM
BASOPHILS RELATIVE PERCENT: 0.3 % (ref 0–1)
BUN SERPL-MCNC: 47 MG/DL (ref 6–23)
CALCIUM SERPL-MCNC: 8.1 MG/DL (ref 8.3–10.6)
CHLORIDE BLD-SCNC: 97 MMOL/L (ref 99–110)
CO2: 28 MMOL/L (ref 21–32)
CREAT SERPL-MCNC: 6.5 MG/DL (ref 0.9–1.3)
CRP SERPL HS-MCNC: 28.4 MG/L
DIFFERENTIAL TYPE: ABNORMAL
EOSINOPHILS ABSOLUTE: 0.2 K/CU MM
EOSINOPHILS RELATIVE PERCENT: 3.3 % (ref 0–3)
GFR SERPL CREATININE-BSD FRML MDRD: 10 ML/MIN/1.73M2
GLUCOSE BLD-MCNC: 137 MG/DL (ref 70–99)
GLUCOSE BLD-MCNC: 138 MG/DL (ref 70–99)
GLUCOSE BLD-MCNC: 153 MG/DL (ref 70–99)
GLUCOSE BLD-MCNC: 82 MG/DL (ref 70–99)
GLUCOSE SERPL-MCNC: 144 MG/DL (ref 70–99)
HCT VFR BLD CALC: 26.5 % (ref 42–52)
HEMOGLOBIN: 8.2 GM/DL (ref 13.5–18)
IMMATURE NEUTROPHIL %: 0.5 % (ref 0–0.43)
LYMPHOCYTES ABSOLUTE: 0.7 K/CU MM
LYMPHOCYTES RELATIVE PERCENT: 11 % (ref 24–44)
MCH RBC QN AUTO: 30 PG (ref 27–31)
MCHC RBC AUTO-ENTMCNC: 30.9 % (ref 32–36)
MCV RBC AUTO: 97.1 FL (ref 78–100)
MONOCYTES ABSOLUTE: 0.7 K/CU MM
MONOCYTES RELATIVE PERCENT: 10.5 % (ref 0–4)
NEUTROPHILS RELATIVE PERCENT: 74.4 % (ref 36–66)
NUCLEATED RBC %: 0 %
PDW BLD-RTO: 15.4 % (ref 11.7–14.9)
PLATELET # BLD: 167 K/CU MM (ref 140–440)
PMV BLD AUTO: 9.9 FL (ref 7.5–11.1)
POTASSIUM SERPL-SCNC: 5.1 MMOL/L (ref 3.5–5.1)
PROCALCITONIN SERPL-MCNC: 0.67 NG/ML
RBC # BLD: 2.73 M/CU MM (ref 4.6–6.2)
SEGMENTED NEUTROPHILS ABSOLUTE COUNT: 4.7 K/CU MM
SODIUM BLD-SCNC: 138 MMOL/L (ref 135–145)
TOTAL CK: 56 IU/L (ref 38–174)
TOTAL IMMATURE NEUTOROPHIL: 0.03 K/CU MM
TOTAL NUCLEATED RBC: 0 K/CU MM
WBC # BLD: 6.3 K/CU MM (ref 4–10.5)

## 2024-04-25 PROCEDURE — 1200000000 HC SEMI PRIVATE

## 2024-04-25 PROCEDURE — 86140 C-REACTIVE PROTEIN: CPT

## 2024-04-25 PROCEDURE — APPSS60 APP SPLIT SHARED TIME 46-60 MINUTES: Performed by: NURSE PRACTITIONER

## 2024-04-25 PROCEDURE — 36415 COLL VENOUS BLD VENIPUNCTURE: CPT

## 2024-04-25 PROCEDURE — 2500000003 HC RX 250 WO HCPCS: Performed by: INTERNAL MEDICINE

## 2024-04-25 PROCEDURE — 71045 X-RAY EXAM CHEST 1 VIEW: CPT

## 2024-04-25 PROCEDURE — 85025 COMPLETE CBC W/AUTO DIFF WBC: CPT

## 2024-04-25 PROCEDURE — 97166 OT EVAL MOD COMPLEX 45 MIN: CPT

## 2024-04-25 PROCEDURE — 82962 GLUCOSE BLOOD TEST: CPT

## 2024-04-25 PROCEDURE — 6370000000 HC RX 637 (ALT 250 FOR IP): Performed by: STUDENT IN AN ORGANIZED HEALTH CARE EDUCATION/TRAINING PROGRAM

## 2024-04-25 PROCEDURE — 6360000002 HC RX W HCPCS: Performed by: NURSE PRACTITIONER

## 2024-04-25 PROCEDURE — 2700000000 HC OXYGEN THERAPY PER DAY

## 2024-04-25 PROCEDURE — 99223 1ST HOSP IP/OBS HIGH 75: CPT | Performed by: INTERNAL MEDICINE

## 2024-04-25 PROCEDURE — 80048 BASIC METABOLIC PNL TOTAL CA: CPT

## 2024-04-25 PROCEDURE — 84145 PROCALCITONIN (PCT): CPT

## 2024-04-25 PROCEDURE — 2580000003 HC RX 258: Performed by: NURSE PRACTITIONER

## 2024-04-25 PROCEDURE — 82550 ASSAY OF CK (CPK): CPT

## 2024-04-25 PROCEDURE — 94761 N-INVAS EAR/PLS OXIMETRY MLT: CPT

## 2024-04-25 PROCEDURE — 2580000003 HC RX 258: Performed by: STUDENT IN AN ORGANIZED HEALTH CARE EDUCATION/TRAINING PROGRAM

## 2024-04-25 PROCEDURE — 6370000000 HC RX 637 (ALT 250 FOR IP): Performed by: INTERNAL MEDICINE

## 2024-04-25 PROCEDURE — 97530 THERAPEUTIC ACTIVITIES: CPT

## 2024-04-25 PROCEDURE — 6360000002 HC RX W HCPCS: Performed by: STUDENT IN AN ORGANIZED HEALTH CARE EDUCATION/TRAINING PROGRAM

## 2024-04-25 RX ORDER — CALCIUM CARBONATE 500 MG/1
500 TABLET, CHEWABLE ORAL 3 TIMES DAILY PRN
Status: DISCONTINUED | OUTPATIENT
Start: 2024-04-25 | End: 2024-04-27 | Stop reason: HOSPADM

## 2024-04-25 RX ORDER — OXYCODONE HYDROCHLORIDE 10 MG/1
10 TABLET ORAL EVERY 6 HOURS PRN
Status: DISCONTINUED | OUTPATIENT
Start: 2024-04-25 | End: 2024-04-27 | Stop reason: HOSPADM

## 2024-04-25 RX ADMIN — CALCIUM ACETATE 1334 MG: 667 CAPSULE ORAL at 14:10

## 2024-04-25 RX ADMIN — OXYCODONE HYDROCHLORIDE 10 MG: 10 TABLET ORAL at 21:28

## 2024-04-25 RX ADMIN — ATORVASTATIN CALCIUM 40 MG: 40 TABLET, FILM COATED ORAL at 21:28

## 2024-04-25 RX ADMIN — SODIUM ZIRCONIUM CYCLOSILICATE 10 G: 10 POWDER, FOR SUSPENSION ORAL at 08:34

## 2024-04-25 RX ADMIN — CALCITRIOL CAPSULES 0.25 MCG 0.5 MCG: 0.25 CAPSULE ORAL at 08:35

## 2024-04-25 RX ADMIN — OXYCODONE AND ACETAMINOPHEN 1 TABLET: 7.5; 325 TABLET ORAL at 05:59

## 2024-04-25 RX ADMIN — CALCITRIOL CAPSULES 0.25 MCG 0.5 MCG: 0.25 CAPSULE ORAL at 21:28

## 2024-04-25 RX ADMIN — CALCIUM ACETATE 1334 MG: 667 CAPSULE ORAL at 08:35

## 2024-04-25 RX ADMIN — OXYCODONE AND ACETAMINOPHEN 1 TABLET: 7.5; 325 TABLET ORAL at 00:15

## 2024-04-25 RX ADMIN — INSULIN LISPRO 10 UNITS: 100 INJECTION, SOLUTION INTRAVENOUS; SUBCUTANEOUS at 08:36

## 2024-04-25 RX ADMIN — HYDRALAZINE HYDROCHLORIDE 25 MG: 25 TABLET ORAL at 14:10

## 2024-04-25 RX ADMIN — HYDRALAZINE HYDROCHLORIDE 25 MG: 25 TABLET ORAL at 21:28

## 2024-04-25 RX ADMIN — Medication 2000 UNITS: at 08:35

## 2024-04-25 RX ADMIN — HEPARIN SODIUM 5000 UNITS: 5000 INJECTION INTRAVENOUS; SUBCUTANEOUS at 14:10

## 2024-04-25 RX ADMIN — HEPARIN SODIUM 5000 UNITS: 5000 INJECTION INTRAVENOUS; SUBCUTANEOUS at 05:59

## 2024-04-25 RX ADMIN — SODIUM CHLORIDE, PRESERVATIVE FREE 10 ML: 5 INJECTION INTRAVENOUS at 08:37

## 2024-04-25 RX ADMIN — POLYETHYLENE GLYCOL 3350 17 G: 17 POWDER, FOR SOLUTION ORAL at 08:34

## 2024-04-25 RX ADMIN — DAPTOMYCIN 550 MG: 500 INJECTION, POWDER, LYOPHILIZED, FOR SOLUTION INTRAVENOUS at 15:14

## 2024-04-25 RX ADMIN — HYDRALAZINE HYDROCHLORIDE 25 MG: 25 TABLET ORAL at 05:59

## 2024-04-25 RX ADMIN — OXYCODONE HYDROCHLORIDE 10 MG: 10 TABLET ORAL at 15:17

## 2024-04-25 RX ADMIN — CALCIUM CARBONATE 500 MG: 500 TABLET, CHEWABLE ORAL at 11:55

## 2024-04-25 RX ADMIN — ONDANSETRON 4 MG: 4 TABLET, ORALLY DISINTEGRATING ORAL at 11:06

## 2024-04-25 RX ADMIN — AMLODIPINE BESYLATE 5 MG: 5 TABLET ORAL at 08:36

## 2024-04-25 RX ADMIN — HEPARIN SODIUM 5000 UNITS: 5000 INJECTION INTRAVENOUS; SUBCUTANEOUS at 21:27

## 2024-04-25 RX ADMIN — INSULIN GLARGINE 15 UNITS: 100 INJECTION, SOLUTION SUBCUTANEOUS at 21:27

## 2024-04-25 ASSESSMENT — PAIN SCALES - WONG BAKER
WONGBAKER_NUMERICALRESPONSE: HURTS EVEN MORE
WONGBAKER_NUMERICALRESPONSE: HURTS EVEN MORE
WONGBAKER_NUMERICALRESPONSE: NO HURT
WONGBAKER_NUMERICALRESPONSE: HURTS EVEN MORE

## 2024-04-25 ASSESSMENT — PAIN DESCRIPTION - LOCATION
LOCATION: FOOT

## 2024-04-25 ASSESSMENT — PAIN DESCRIPTION - FREQUENCY
FREQUENCY: CONTINUOUS

## 2024-04-25 ASSESSMENT — PAIN SCALES - GENERAL
PAINLEVEL_OUTOF10: 8
PAINLEVEL_OUTOF10: 6
PAINLEVEL_OUTOF10: 6
PAINLEVEL_OUTOF10: 8
PAINLEVEL_OUTOF10: 7
PAINLEVEL_OUTOF10: 7
PAINLEVEL_OUTOF10: 5
PAINLEVEL_OUTOF10: 7
PAINLEVEL_OUTOF10: 8
PAINLEVEL_OUTOF10: 8
PAINLEVEL_OUTOF10: 7
PAINLEVEL_OUTOF10: 7

## 2024-04-25 ASSESSMENT — PAIN DESCRIPTION - DESCRIPTORS
DESCRIPTORS: OTHER (COMMENT)
DESCRIPTORS: ACHING;DISCOMFORT
DESCRIPTORS: OTHER (COMMENT)
DESCRIPTORS: ACHING;DISCOMFORT
DESCRIPTORS: OTHER (COMMENT)
DESCRIPTORS: ACHING;DISCOMFORT
DESCRIPTORS: ACHING;DISCOMFORT

## 2024-04-25 ASSESSMENT — PAIN - FUNCTIONAL ASSESSMENT
PAIN_FUNCTIONAL_ASSESSMENT: PREVENTS OR INTERFERES SOME ACTIVE ACTIVITIES AND ADLS
PAIN_FUNCTIONAL_ASSESSMENT: ACTIVITIES ARE NOT PREVENTED
PAIN_FUNCTIONAL_ASSESSMENT: PREVENTS OR INTERFERES SOME ACTIVE ACTIVITIES AND ADLS
PAIN_FUNCTIONAL_ASSESSMENT: ACTIVITIES ARE NOT PREVENTED
PAIN_FUNCTIONAL_ASSESSMENT: PREVENTS OR INTERFERES SOME ACTIVE ACTIVITIES AND ADLS
PAIN_FUNCTIONAL_ASSESSMENT: ACTIVITIES ARE NOT PREVENTED
PAIN_FUNCTIONAL_ASSESSMENT: ACTIVITIES ARE NOT PREVENTED

## 2024-04-25 ASSESSMENT — PAIN DESCRIPTION - ORIENTATION
ORIENTATION: LEFT

## 2024-04-25 ASSESSMENT — PAIN DESCRIPTION - PAIN TYPE
TYPE: CHRONIC PAIN;SURGICAL PAIN
TYPE: CHRONIC PAIN
TYPE: CHRONIC PAIN;SURGICAL PAIN

## 2024-04-25 ASSESSMENT — PAIN DESCRIPTION - ONSET
ONSET: ON-GOING

## 2024-04-25 NOTE — PROGRESS NOTES
Pharmacy Note - Renal dose adjustment made per P/T protocol    Original order:  Daptomycin 550 mg every 24 hours    Estimated Creatinine Clearance: 18 mL/min (A) (based on SCr of 6.5 mg/dL (H)).    Recent Labs     04/23/24  0930 04/24/24  0350 04/25/24  0250   BUN 36* 49* 47*   CREATININE 4.9* 6.0* 6.5*       Renally adjusted order:  Due to dialysis patient/renal status change to   Daptomycin 550 mg every 48 hours  (administer after hemodialysis on dialysis days).     Please call pharmacy with any questions.    Thank you,  Parth Alston [facility]  4/25/2024 2:08 PM

## 2024-04-25 NOTE — PROGRESS NOTES
Nephrology Progress Note        [address], [address]  [place] [address]  Phone: [phone]  Office Hours: 8:30AM - 4:30PM  Monday - Friday 4/25/2024 6:57 AM  Subjective:   Admit Date: 4/22/2024  PCP: José Luis Izquierdo MD  Interval History:   On NC  Good BP      Diet: ADULT DIET; Regular; 5 carb choices (75 gm/meal); Low Potassium (Less than 3000 mg/day); 1500 ml      Data:   Scheduled Meds:   sodium zirconium cyclosilicate  10 g Oral Once per day on Sun Tue Thu Sat    epoetin paola-epbx  10,000 Units IntraVENous Once per day on Mon Wed Fri    ciprofloxacin  400 mg IntraVENous Once    amLODIPine  5 mg Oral Daily    atorvastatin  40 mg Oral Nightly    calcitRIOL  0.5 mcg Oral BID    hydrALAZINE  25 mg Oral 3 times per day    insulin glargine  15 Units SubCUTAneous Nightly    insulin lispro  10 Units SubCUTAneous TID WC    insulin lispro  0-8 Units SubCUTAneous TID WC    insulin lispro  0-4 Units SubCUTAneous Nightly    ciprofloxacin  400 mg IntraVENous Q24H    sodium chloride flush  5-40 mL IntraVENous 2 times per day    heparin (porcine)  5,000 Units SubCUTAneous 3 times per day    calcium acetate  2 capsule Oral TID WC    Vitamin D  2,000 Units Oral Daily     Continuous Infusions:   dextrose      sodium chloride 25 mL/hr at 04/24/24 1426     PRN Meds:prochlorperazine, albuterol sulfate HFA, oxyCODONE-acetaminophen, glucose, dextrose bolus **OR** dextrose bolus, glucagon (rDNA), dextrose, sodium chloride flush, sodium chloride, ondansetron **OR** ondansetron, polyethylene glycol, acetaminophen **OR** acetaminophen  I/O last 3 completed shifts:  In: 1650 [P.O.:240; I.V.:410]  Out: 7050 [Blood:50]  No intake/output data recorded.    Intake/Output Summary (Last 24 hours) at 4/25/2024 0657  Last data filed at 4/25/2024 0647  Gross per 24 hour   Intake 740 ml   Output 3500 ml   Net -2760 ml       CBC:   Recent Labs     04/23/24  0930 04/24/24  0350 04/25/24  0250   WBC 4.7 7.5 6.3   HGB 8.1*

## 2024-04-25 NOTE — CARE COORDINATION
Chart reviewed and patient discussed in IDR. Awaiting ID final recs. From home. Active with [facility]. CM following.

## 2024-04-25 NOTE — PLAN OF CARE
Problem: Discharge Planning  Goal: Discharge to home or other facility with appropriate resources  Outcome: Progressing     Problem: Safety - Adult  Goal: Free from fall injury  4/24/2024 2218 by Yoli Swenson RN  Outcome: Progressing  4/24/2024 1154 by Laura Shoemaker LPN  Outcome: Progressing     Problem: ABCDS Injury Assessment  Goal: Absence of physical injury  Outcome: Progressing     Problem: Pain  Goal: Verbalizes/displays adequate comfort level or baseline comfort level  4/24/2024 2218 by Yoli Swenson RN  Outcome: Progressing  4/24/2024 1154 by Laura Shoemaker LPN  Outcome: Progressing     Problem: Chronic Conditions and Co-morbidities  Goal: Patient's chronic conditions and co-morbidity symptoms are monitored and maintained or improved  Outcome: Progressing

## 2024-04-25 NOTE — CONSULTS
Infectious Disease Consult Note  2024   Patient Name: Yovanny Levine : 1975   Impression  Polymicrobial Infection of Left DFI with OM s/p 5th MT Amputation 2024:  Allergy/ Intolerance to doxycycline (N/V):  CrCl 18 on HD. Afebrile with no leukocytosis.   -BC 0/2 NGTD  -Left foot wound culture: Corynebacterium striatum (no sensi), Oligella urethralis (no sensi), and Enterococcus faecium (sensi pending)  Past: 2024: MRI Left foot wo contrast: 1. Osteomyelitis at the distal 5th metatarsal.2. The proximal phalanx of the 5th digit has reactive edema.3. Irregularity of the distal 1st metatarsal stump.  However, this is improved in appearance since the previous study.  -Left foot XR: 1. Soft tissue ulcer adjacent to the fifth metatarsal with increased bony erosion of the fifth metatarsal since prior film consistent with osteomyelitis. 2. Diffuse soft tissue swelling. 3. No soft tissue gas.   -BC 0/2 NGTD  -S/p per Dr. Forrest: Left 5th toe metatarsal amputation, excisional debridement of diabetic foot ulcer. Cultures: Enterococcus faecium (sensi pending), Corynebacterium striatum. Pathology: Pending.  ESRD on HD ([facility]):  Dr. James onboard  Right Charcot Foot s/p Amputations 2019:  DMII with Polyneuropathy:  Class II Obesity: BMI: 39.91 kg/m2  Multi-morbidity: per PMHx: ESRD on HD thrice weekly, anxiety/ depression, CAD, DMII with polyneuropathy, past Irene's gangrene, HTN, HLD, s/p right toe amputations  with Charcot's foot, s/p left toe amputation  and allergy/intolerance to doxycycline  Plan:  DC IV ciprofloxacin   Start IV daptomycin 6 mg/kg per adjusted dose, as concern for possible VRE as has had multiple ABX, while awaiting sensitivities  (if sensi to vancomycin will transition)  Trend CK today and weekly while on daptomycin  Will plan on a 6 week course of ABX therapy, possibly po after sensitivities return  Await surgical cultures and pathology for final ABX recs  and duration, will plan on at least a 6 week course of therapy as OM appears extensive.  Trend CRP and Pct, ordered    Thank you for allowing me to consult in the care of this patient.  ------------------------  REASON FOR CONSULT: Infective syndrome \"polymicrobial foot infection\"  Requested by: Dr. Sade Medina  HPI:Patient is a 48 y.o.  male known to ID, living with ESRD on HD thrice weekly, anxiety/ depression, CAD, DMII with polyneuropathy, past Irene's gangrene, HTN, HLD, s/p right toe amputations 2019, s/p left toe amputation 2023 and allergy/intolerance to doxycycline and multiple recent hospitalizations who was admitted 4/22/2024 for further evaluation and management of worsening left foot wound. He presented afebrile with no leukocytosis, BC showed NGTD and left foot wound culture grew Corynebacterium striatum and Oligella urethralis. He has been on po Cipro since his last hospitalization based on cultures growing on Proteus vulgaris from 3/18/2024. Cultures prior from 2/7/2024 grew Proteus penneri, Proteus vulgaris, Enterococcus faecalis and Finegoldia magna. His left foot XR revealed OM of the 5th MT with scattered tissue swelling. He was started on IV ciprofloxacin. He underwent per Dr. Forrest on 4/23 a left 5th toe metatarsal amputation, excisional debridement of diabetic foot ulcer with cultures and pathology pending.     Infectious diseases service was consulted to evaluate the pt, and recommend further investigative and therapeutic measures.  ROS: Other systems reviewed Including eyes, ENT, respiratory, cardiovascular, GI, , dermatologic, neurologic, psych, hem/lymphatic, musculoskeletal and endocrine were negative other than what is mentioned above.     Patient Active Problem List    Diagnosis Date Noted    Volume overload 02/22/2023    Acute on chronic respiratory failure with hypoxia (HCC) 02/10/2023    CAD (coronary artery disease) 02/10/2023    Fluid overload 02/07/2023    Problem

## 2024-04-25 NOTE — PROGRESS NOTES
Occupational Therapy    [facility] ACUTE CARE OCCUPATIONAL THERAPY EVALUATION    Yovanny Levine, 1975, 1103/1103-A, 4/25/2024    Discharge Recommendation: Home with initial assistance PRN, Home Health OT services (S Level 3), [place]    History:  [place]:  The primary encounter diagnosis was Diabetic ulcer of left midfoot associated with type 2 diabetes mellitus, unspecified ulcer stage ([facility]). Diagnoses of ESRD (end stage renal disease) on dialysis ([facility]) and Infection were also pertinent to this visit.    Subjective:  Patient states: \"I got up to the bathroom yesterday.\"  Pain: Pt reported 7/10 surgical pain in Lt foot  Communication with other providers: LPN Kristal  Restrictions: General Precautions, Fall Risk, WB on Lt heel only with post-op shoe, Wound Vac, Bed exit alarm    Home Setup/Prior level of function:  Social/Functional History  Lives With: Roommate (who works full time per pt)  Type of Home: House  Home Layout: Two level, 1/2 bath on main level  Home Access: Stairs to enter without rails  Entrance Stairs - Number of Steps: 4  Bathroom Shower/Tub: Tub/Shower unit  Bathroom Toilet: Standard  Bathroom Equipment: Shower chair  Bathroom Accessibility: Accessible  Home Equipment: Cane  ADL Assistance: Independent (recently has been spongebathing)  Homemaking Assistance: Independent (shares with roommate but \"it's a struggle\")  Homemaking Responsibilities: Yes  Ambulation Assistance: Independent (mod I with cane)  Transfer Assistance: Independent  Active : No (roommate drives)  Occupation: On disability    Examination:  Observation: Sitting EOB upon OT arrival. Pleasant and agreeable to evaluation.  Vision: WFL  Hearing: WFL  Vitals: Stable vitals throughout session on room air    Body Systems and functions:  ROM: Active shoulder flexion grossly 0-90' in BL UEs, WFL distally in BL UEs  Strength: 4+/5 MMT all major muscle groups BL UEs  Sensation: WFL in BL UEs (See PT evaluation  for LE assessment)  Tone: Normal  Coordination: WFL for ADLs  Perception: WNL    Activities of Daily Living (ADLs):  Feeding: Independent   Grooming: CGA (able to complete in standing at sink with min cues for maintaining Lt LE WB restrictions)  UB bathing: SBA   LB bathing: Min A (for thoroughness with reaching distal Lt LE)  UB dressing: SBA (donning clean robe seated EOB)  LB dressing: Mod A (dependent with donning Lt post-op shoe, able to don Rt sock seated EOB SBA by crossing leg into \"figure 4 position\")  Toileting: CGA    Cognitive and Psychosocial Functioning:  Overall cognitive status: WFL  Affect: Normal     Balance:   Sitting: SBA in unsupported sitting EOB  Standing: CGA with RW; min cues for maintaining Lt LE WB status    Functional Mobility:  Bed Mobility: Not observed. Pt received sitting EOB upon arrival.  Transfers: CGA to/from bed (min cues for technique/safe hand placement each direction)  Ambulation: CGA with RW ~100 ft; min coaching for sequencing steps, RW mgmt, and maintaining Lt LE WB restrictions      AM-PAC 6 click short form for inpatient daily activity:   How much help from another person does the patient currently need... Unable  Dep A Lot  Max A A Lot   Mod A A Little  Min A A Little   CGA  SBA None   Mod I  Indep  Sup   1.  Putting on and taking off regular lower body clothing? [] 1    [] 2   [x] 2   [] 3   [] 3   [] 4      2. Bathing (including washing, rinsing, drying)? [] 1   [] 2   [] 2 [x] 3 [] 3 [] 4   3. Toileting, which includes using toilet, bedpan, or urinal? [] 1    [] 2   [] 2   [] 3   [x] 3   [] 4     4. Putting on and taking off regular upper body clothing? [] 1   [] 2   [] 2   [] 3   [x] 3    [] 4      5. Taking care of personal grooming such as brushing teeth? [] 1   [] 2    [] 2 [] 3    [x] 3   [] 4      6. Eating meals?   [] 1   [] 2   [] 2   [] 3   [] 3   [x] 4      Raw Score:  18  [24=0% impaired(CH), 23=1-19%(CI), 20-22=20-39%(CJ), 15-19=40-59%(CK),

## 2024-04-25 NOTE — PROGRESS NOTES
cyclosilicate  10 g Oral Once per day on Sun Tue Thu Sat    epoetin paola-epbx  10,000 Units IntraVENous Once per day on Mon Wed Fri    ciprofloxacin  400 mg IntraVENous Once    amLODIPine  5 mg Oral Daily    atorvastatin  40 mg Oral Nightly    calcitRIOL  0.5 mcg Oral BID    hydrALAZINE  25 mg Oral 3 times per day    insulin glargine  15 Units SubCUTAneous Nightly    insulin lispro  10 Units SubCUTAneous TID WC    insulin lispro  0-8 Units SubCUTAneous TID WC    insulin lispro  0-4 Units SubCUTAneous Nightly    ciprofloxacin  400 mg IntraVENous Q24H    sodium chloride flush  5-40 mL IntraVENous 2 times per day    heparin (porcine)  5,000 Units SubCUTAneous 3 times per day    calcium acetate  2 capsule Oral TID WC    Vitamin D  2,000 Units Oral Daily      Infusions:    dextrose      sodium chloride 25 mL/hr at 04/24/24 1426     PRN Meds: calcium carbonate, 500 mg, TID PRN  prochlorperazine, 5 mg, Q6H PRN  albuterol sulfate HFA, 2 puff, 4x Daily PRN  oxyCODONE-acetaminophen, 1 tablet, Q6H PRN  glucose, 4 tablet, PRN  dextrose bolus, 125 mL, PRN   Or  dextrose bolus, 250 mL, PRN  glucagon (rDNA), 1 mg, PRN  dextrose, , Continuous PRN  sodium chloride flush, 5-40 mL, PRN  sodium chloride, , PRN  ondansetron, 4 mg, Q8H PRN   Or  ondansetron, 4 mg, Q6H PRN  polyethylene glycol, 17 g, Daily PRN  acetaminophen, 650 mg, Q6H PRN   Or  acetaminophen, 650 mg, Q6H PRN        Labs and Imaging   XR FOOT LEFT (MIN 3 VIEWS)    Result Date: 4/22/2024  History: Diabetic wound of the left foot. COMPARISON: Left foot radiographs 3/12/2024. FINDINGS: AP, lateral, and oblique views of the left foot were obtained. Previous amputation of the first digit at the level of the mid metatarsal. Amputation of the second digit at the level of the second MTP. Focal soft tissue ulcer overlying the fifth digit at the level of the fifth MTP joint. Bony erosion of the distal aspect of the fifth metatarsal with increased erosion since prior film     AMORPHOUS RARE 07/24/2022 11:40 PM       Organism:   Lab Results   Component Value Date/Time    ORG SAUR 01/07/2019 12:08 AM         Electronically signed by Eduard Medina MD on 4/25/2024 at 11:49 AM

## 2024-04-25 NOTE — PROGRESS NOTES
04/25/24 1537   Encounter Summary   Encounter Overview/Reason Spiritual/Emotional Needs;Palliative Care   Service Provided For Patient   Referral/Consult From [facility]   Support System Family members   Last Encounter  04/25/24  (PT dealing with grief, lost his mother, dealing with his health issues and a recent divorce, he's in pain yet he's a fighter, wants to be positive, has tutu in God, finds meaning in his kids, confident he will get well, high risk readmit, EOL,pall-care.)   Complexity of Encounter Low   Begin Time 1510   End Time  1539   Total Time Calculated 29 min   Spiritual/Emotional needs   Type Spiritual Support;Difficult news received;Emotional Distress;Spiritual Distress   Grief, Loss, and Adjustments   Type End of Life;Grief and loss;Adjustment to illness;Life Adjustments   Palliative Care   Type Palliative Care, Initial/Spiritual Assessment   Assessment/Intervention/Outcome   Assessment Anxious;Concerns with suffering;Coping;Despair;Fearful;Interrupted family processes;Loneliness;Powerlessness;Sad;Stress overload   Intervention Active listening;Discussed belief system/Latter day practices/tutu;Discussed death, afterlife;Discussed illness injury and it’s impact;Discussed meaning/purpose;Discussed relationship with God;Explored/Affirmed feelings, thoughts, concerns;Explored Coping Skills/Resources;Grief Care;Nurtured Hope;Prayer (assurance of)/[place];Sustaining Presence/Ministry of presence   Outcome Acceptance;Comfort;Coping;Encouraged;Engaged in conversation;Expressed feelings, needs, and concerns;Expressed Gratitude;Grieving;Peace   Plan and Referrals   Plan/Referrals Continue Support (comment)

## 2024-04-26 LAB
CRP SERPL HS-MCNC: 26.1 MG/L
CULTURE: ABNORMAL
GLUCOSE BLD-MCNC: 113 MG/DL (ref 70–99)
GLUCOSE BLD-MCNC: 122 MG/DL (ref 70–99)
GLUCOSE BLD-MCNC: 126 MG/DL (ref 70–99)
GLUCOSE BLD-MCNC: 165 MG/DL (ref 70–99)
GRAM SMEAR: ABNORMAL
Lab: ABNORMAL
PROCALCITONIN SERPL-MCNC: 0.67 NG/ML
SPECIMEN: ABNORMAL

## 2024-04-26 PROCEDURE — 6360000002 HC RX W HCPCS: Performed by: STUDENT IN AN ORGANIZED HEALTH CARE EDUCATION/TRAINING PROGRAM

## 2024-04-26 PROCEDURE — 6360000002 HC RX W HCPCS: Performed by: NURSE PRACTITIONER

## 2024-04-26 PROCEDURE — 99233 SBSQ HOSP IP/OBS HIGH 50: CPT | Performed by: NURSE PRACTITIONER

## 2024-04-26 PROCEDURE — 82962 GLUCOSE BLOOD TEST: CPT

## 2024-04-26 PROCEDURE — 36415 COLL VENOUS BLD VENIPUNCTURE: CPT

## 2024-04-26 PROCEDURE — 6360000002 HC RX W HCPCS: Performed by: INTERNAL MEDICINE

## 2024-04-26 PROCEDURE — 2580000003 HC RX 258: Performed by: NURSE PRACTITIONER

## 2024-04-26 PROCEDURE — 97605 NEG PRS WND THER DME<=50SQCM: CPT

## 2024-04-26 PROCEDURE — 94761 N-INVAS EAR/PLS OXIMETRY MLT: CPT

## 2024-04-26 PROCEDURE — 2700000000 HC OXYGEN THERAPY PER DAY

## 2024-04-26 PROCEDURE — 90935 HEMODIALYSIS ONE EVALUATION: CPT

## 2024-04-26 PROCEDURE — 2580000003 HC RX 258: Performed by: STUDENT IN AN ORGANIZED HEALTH CARE EDUCATION/TRAINING PROGRAM

## 2024-04-26 PROCEDURE — 6370000000 HC RX 637 (ALT 250 FOR IP): Performed by: INTERNAL MEDICINE

## 2024-04-26 PROCEDURE — 84145 PROCALCITONIN (PCT): CPT

## 2024-04-26 PROCEDURE — 2500000003 HC RX 250 WO HCPCS: Performed by: INTERNAL MEDICINE

## 2024-04-26 PROCEDURE — 86140 C-REACTIVE PROTEIN: CPT

## 2024-04-26 PROCEDURE — 1200000000 HC SEMI PRIVATE

## 2024-04-26 PROCEDURE — 6370000000 HC RX 637 (ALT 250 FOR IP): Performed by: STUDENT IN AN ORGANIZED HEALTH CARE EDUCATION/TRAINING PROGRAM

## 2024-04-26 RX ADMIN — OXYCODONE HYDROCHLORIDE 10 MG: 10 TABLET ORAL at 07:59

## 2024-04-26 RX ADMIN — Medication 2000 UNITS: at 07:59

## 2024-04-26 RX ADMIN — CALCIUM ACETATE 1334 MG: 667 CAPSULE ORAL at 07:58

## 2024-04-26 RX ADMIN — SODIUM CHLORIDE, PRESERVATIVE FREE 10 ML: 5 INJECTION INTRAVENOUS at 21:21

## 2024-04-26 RX ADMIN — ATORVASTATIN CALCIUM 40 MG: 40 TABLET, FILM COATED ORAL at 21:16

## 2024-04-26 RX ADMIN — HEPARIN SODIUM 5000 UNITS: 5000 INJECTION INTRAVENOUS; SUBCUTANEOUS at 14:26

## 2024-04-26 RX ADMIN — AMLODIPINE BESYLATE 5 MG: 5 TABLET ORAL at 07:59

## 2024-04-26 RX ADMIN — OXYCODONE HYDROCHLORIDE 10 MG: 10 TABLET ORAL at 14:30

## 2024-04-26 RX ADMIN — CALCIUM ACETATE 1334 MG: 667 CAPSULE ORAL at 12:50

## 2024-04-26 RX ADMIN — VANCOMYCIN HYDROCHLORIDE 2000 MG: 5 INJECTION, POWDER, LYOPHILIZED, FOR SOLUTION INTRAVENOUS at 13:05

## 2024-04-26 RX ADMIN — CALCITRIOL CAPSULES 0.25 MCG 0.5 MCG: 0.25 CAPSULE ORAL at 21:17

## 2024-04-26 RX ADMIN — EPOETIN ALFA-EPBX 10000 UNITS: 10000 INJECTION, SOLUTION INTRAVENOUS; SUBCUTANEOUS at 10:13

## 2024-04-26 RX ADMIN — SODIUM CHLORIDE, PRESERVATIVE FREE 10 ML: 5 INJECTION INTRAVENOUS at 08:00

## 2024-04-26 RX ADMIN — INSULIN LISPRO 10 UNITS: 100 INJECTION, SOLUTION INTRAVENOUS; SUBCUTANEOUS at 17:21

## 2024-04-26 RX ADMIN — OXYCODONE HYDROCHLORIDE 10 MG: 10 TABLET ORAL at 20:43

## 2024-04-26 RX ADMIN — CALCITRIOL CAPSULES 0.25 MCG 0.5 MCG: 0.25 CAPSULE ORAL at 07:59

## 2024-04-26 RX ADMIN — INSULIN GLARGINE 15 UNITS: 100 INJECTION, SOLUTION SUBCUTANEOUS at 21:20

## 2024-04-26 RX ADMIN — HYDRALAZINE HYDROCHLORIDE 25 MG: 25 TABLET ORAL at 06:11

## 2024-04-26 RX ADMIN — HYDRALAZINE HYDROCHLORIDE 25 MG: 25 TABLET ORAL at 14:27

## 2024-04-26 RX ADMIN — HEPARIN SODIUM 5000 UNITS: 5000 INJECTION INTRAVENOUS; SUBCUTANEOUS at 21:17

## 2024-04-26 RX ADMIN — HYDRALAZINE HYDROCHLORIDE 25 MG: 25 TABLET ORAL at 21:16

## 2024-04-26 RX ADMIN — HEPARIN SODIUM 5000 UNITS: 5000 INJECTION INTRAVENOUS; SUBCUTANEOUS at 06:10

## 2024-04-26 ASSESSMENT — PAIN DESCRIPTION - FREQUENCY
FREQUENCY: CONTINUOUS

## 2024-04-26 ASSESSMENT — PAIN DESCRIPTION - LOCATION
LOCATION: FOOT

## 2024-04-26 ASSESSMENT — PAIN DESCRIPTION - PAIN TYPE
TYPE: SURGICAL PAIN

## 2024-04-26 ASSESSMENT — PAIN DESCRIPTION - ORIENTATION
ORIENTATION: LEFT

## 2024-04-26 ASSESSMENT — PAIN SCALES - WONG BAKER
WONGBAKER_NUMERICALRESPONSE: HURTS WHOLE LOT
WONGBAKER_NUMERICALRESPONSE: HURTS EVEN MORE
WONGBAKER_NUMERICALRESPONSE: HURTS EVEN MORE
WONGBAKER_NUMERICALRESPONSE: HURTS WHOLE LOT
WONGBAKER_NUMERICALRESPONSE: HURTS EVEN MORE

## 2024-04-26 ASSESSMENT — PAIN DESCRIPTION - ONSET
ONSET: ON-GOING

## 2024-04-26 ASSESSMENT — PAIN SCALES - GENERAL
PAINLEVEL_OUTOF10: 7
PAINLEVEL_OUTOF10: 8
PAINLEVEL_OUTOF10: 8
PAINLEVEL_OUTOF10: 7
PAINLEVEL_OUTOF10: 0
PAINLEVEL_OUTOF10: 7
PAINLEVEL_OUTOF10: 8
PAINLEVEL_OUTOF10: 7
PAINLEVEL_OUTOF10: 7
PAINLEVEL_OUTOF10: 5

## 2024-04-26 ASSESSMENT — PAIN - FUNCTIONAL ASSESSMENT
PAIN_FUNCTIONAL_ASSESSMENT: ACTIVITIES ARE NOT PREVENTED

## 2024-04-26 ASSESSMENT — PAIN DESCRIPTION - DESCRIPTORS
DESCRIPTORS: ACHING;DISCOMFORT
DESCRIPTORS: ACHING
DESCRIPTORS: ACHING;DISCOMFORT

## 2024-04-26 NOTE — PROGRESS NOTES
Infectious Disease Progress Note  2024   Patient Name: Yovanny Levine : 1975   Impression  Polymicrobial Infection of Left DFU with OM s/p 5th MT Amputation 2024:  Allergy/ Intolerance to doxycycline (N/V):  CrCl 18 on HD. Afebrile with no leukocytosis. Pct 0.665, 0.668. CRP 28, 26. CK 56.   -BC 0/2 NGTD  -Left foot wound culture: Corynebacterium striatum (no sensi), Oligella urethralis (no sensi), and Enterococcus faecium (resistant to ampicillin, sensi to vancomycin with PAMELA 1)  Past: 2024: MRI Left foot wo contrast: 1. Osteomyelitis at the distal 5th metatarsal.2. The proximal phalanx of the 5th digit has reactive edema.3. Irregularity of the distal 1st metatarsal stump.  However, this is improved in appearance since the previous study.  -Left foot XR: 1. Soft tissue ulcer adjacent to the fifth metatarsal with increased bony erosion of the fifth metatarsal since prior film consistent with osteomyelitis. 2. Diffuse soft tissue swelling. 3. No soft tissue gas.   -BC 0/2 NGTD  -S/p per Dr. Forrest: Left 5th toe metatarsal amputation, excisional debridement of diabetic foot ulcer. Cultures: Enterococcus faecium (sensi pending), Corynebacterium striatum. Pathology: Pending.  ESRD on HD ([facility]):  Dr. James onboard  Right Charcot Foot s/p Amputations 2019:  DMII with Polyneuropathy:  Class II Obesity: BMI: 39.91 kg/m2  Multi-morbidity: per PMHx: ESRD on HD thrice weekly, anxiety/ depression, CAD, DMII with polyneuropathy, past Irene's gangrene, HTN, HLD, s/p right toe amputations  with Charcot's foot, s/p left toe amputation  and allergy/intolerance to doxycycline  Plan:  DC IV daptomycin as sensi has returned  Start IV vancomycin per pharmacy dosing, to cover E.faecium and C.striatum, for a 6 week cumulative course (end date 2024) as no oral options  May give per HD sessions as I DW Dr. James, she is in agreement  Follow up with ID clinic in 1 week please  Weekly  labs drawn on Monday during the course of treatment  CBC with differential, CMP, ESR, CRP, Vancomycin Trough  Fax results to Attn: [facility] # [phone]   OK from ID standpoint to DC when medically ready    Ongoing Antimicrobial Therapy  Vancomycin 4/26-?  Completed Antimicrobial Therapy  Cefazolin 4/23  Ciprofloxacin 4/23-  Daptomycin 4/25- 26  History:?Interval history noted. Chief complaint: Left DFU and OM.   Denies n/v/d/f or untoward effects of antibiotics  Physical Exam:  Vital Signs: BP (!) 177/99   Pulse 86   Temp 98.2 °F (36.8 °C)   Resp 20   Ht 1.753 m (5' 9\")   Wt 123.2 kg (271 lb 9.7 oz)   SpO2 100%   BMI 40.11 kg/m²     Gen: A&O x 4  Wounds: C/D/I left foot with wound VAC intact  HEMT: AT/NC Oropharynx pink, moist, and without lesions or exudates; dentition in good state of repair  Eyes: PERRLA, EOMI, conjunctiva pink, sclera anicteric.   Neck: Supple. Trachea midline. No LAD.  Chest: no distress and CTA. Good air movement. Room air.   Heart: NSR and no MRG.   Abd: soft, non-distended, no tenderness, no hepatomegaly. Normoactive bowel sounds.  Ext: no clubbing, cyanosis, or edema, see wounds above  Neuro: Mental status intact. CN 2-12 intact and no focal sensory or motor deficits     Radiologic / Imaging / TESTING  4/25/2024 XR Chest Portable:   IMPRESSION:  Interval worsening of right basilar airspace disease with likely associated small amount of right pleural fluid.      Electronically signed by Osman Singh MD     Labs:    Recent Results (from the past 24 hour(s))   POCT Glucose    Collection Time: 04/25/24 11:33 AM   Result Value Ref Range    POC Glucose 82 70 - 99 MG/DL   POCT Glucose    Collection Time: 04/25/24  4:04 PM   Result Value Ref Range    POC Glucose 137 (H) 70 - 99 MG/DL   POCT Glucose    Collection Time: 04/25/24  8:14 PM   Result Value Ref Range    POC Glucose 153 (H) 70 - 99 MG/DL   Procalcitonin    Collection Time: 04/26/24  4:59 AM   Result

## 2024-04-26 NOTE — CONSULTS
PHARMACY VANCOMYCIN MONITORING SERVICE  Pharmacy consulted by Lalitha Almodovar NP for monitoring and adjustment.    Indication for treatment: Vancomycin indication: Bone/Joint infection   Goal trough: Trough Goal: 15-20 mcg/mL  AUC/PAMELA: 400-600    Risk Factors for MRSA Identified:   Hospitalization within the past 90 days, Patient on hemodialysis    Pertinent Laboratory Values:   Temp Readings from Last 3 Encounters:   04/26/24 98.3 °F (36.8 °C)   04/16/24 99 °F (37.2 °C) (Temporal)   04/11/24 97.3 °F (36.3 °C) (Oral)     Recent Labs     04/24/24  0350 04/25/24  0250   WBC 7.5 6.3     Recent Labs     04/24/24  0350 04/25/24  0250   BUN 49* 47*   CREATININE 6.0* 6.5*     Estimated Creatinine Clearance: 18 mL/min (A) (based on SCr of 6.5 mg/dL (H)).    Intake/Output Summary (Last 24 hours) at 4/26/2024 1207  Last data filed at 4/26/2024 1157  Gross per 24 hour   Intake 980 ml   Output 3675 ml   Net -2695 ml     Last Encounter Weight:  Wt Readings from Last 3 Encounters:   04/26/24 123.2 kg (271 lb 9.7 oz)   04/08/24 124.8 kg (275 lb 3.2 oz)   03/12/24 115.7 kg (255 lb)       Pertinent Cultures:   Date    Source    Results  4/22   Blood    No growth  4/22   Wound   E. Faecium, C. Striatum  4/23                            Tissue                         E. Faecum, C. Striatum    Vancomycin level:   TROUGH:  No results for input(s): \"VANCOTROUGH\" in the last 72 hours.  RANDOM:  No results for input(s): \"VANCORANDOM\" in the last 72 hours.    Assessment:  HPI: Polymicrobial Infection of Left DFU with OM s/p 5th MT Amputation   4/26 - E. Faecium resistant to ampicillin, sensitive to vancomycin  SCr, BUN, and urine output: ESRD on HD M/W/F, pt did receive dialysis today  Day(s) of therapy: 1 (ID notes vanco to continue until 6/4/24)  Vancomycin concentration:   4/27 - random at 06:00  4/29 - pre-HD @06:00    Plan:  Intermittent vancomycin dosing based on levels due to ESRD  Give vancomycin 2000mg ivpb x1 loading dose

## 2024-04-26 NOTE — PROGRESS NOTES
Patient Name: Yovanny Levine  Patient : 1975  MRN: [id number]     Acct: [id number]  Date of Admission: 2024  Room/Bed: 1103/1103-A  Code Status:  Full Code  Allergies:   Allergies   Allergen Reactions    Adhesive Tape Rash    Doxycycline Nausea And Vomiting    Reglan [Metoclopramide] Anxiety     Diagnosis:    Patient Active Problem List   Diagnosis    Hiatal hernia    Diabetes mellitus type 2, improved controlled    Diabetic neuropathy (HCC)    Panic attack    Anxiety associated with depression    Neck pain    DANIELA (obstructive sleep apnea)    Urinary frequency    Bilateral carpal tunnel syndrome- left worse than right    Hyperlipidemia with target LDL less than 100    Essential hypertension    Bronchitis    Osteomyelitis (HCC)    Abscess    Periumbilical hernia    Sepsis (HCC)    Cellulitis of left foot    Constipation    Intractable nausea and vomiting    Uncontrolled type 2 diabetes mellitus    Nausea and vomiting    Diabetic foot infection (HCC)    Acute renal failure (ARF) (HCC)    Cellulitis    Cellulitis of left arm    Cellulitis, scrotum    Acute epididymitis    Irene gangrene    Recurrent major depressive disorder, in full remission (HCC)    Generalized anxiety disorder    Morbid obesity with body mass index (BMI) of 40.0 to 44.9 in adult (HCC)    Necrotizing fasciitis (HCC)    Syncope    Right groin wound    Ulcer of right groin with fat layer exposed (HCC)    Diabetic ulcer of left midfoot associated with type 2 diabetes mellitus (HCC)    Nephrotic syndrome    Generalized edema    Stage 3b chronic kidney disease (HCC)    Diabetic nephropathy associated with type 2 diabetes mellitus (HCC)    Hypoalbuminemia    Chronic kidney disease, stage IV (severe) (HCC)    FH: CAD (coronary artery disease)    ASCVD (arteriosclerotic cardiovascular disease)    Other proteinuria    Chest pain    Surgical wound dehiscence    CARMEN (acute kidney injury) (HCC)    Anemia    Chest tightness    NSTEMI (non-ST  1160 ml/hr 3108 ml -120 mmHg 100 90 700 NO CHANGE, RESTING Yes   04/26/24 1145 350 ml/min 1190 ml/hr 3430 ml -150 mmHg 100 90 700 resting Yes   04/26/24 1157 -- -- 3500 ml -- -- -- -- tx complete --     Vital Signs  Patient Vitals for the past 8 hrs:   BP Temp Pulse Resp SpO2 Height Weight Weight Method Percent Weight Change   04/26/24 0600 -- -- -- -- -- 1.753 m (5' 9\") 123.2 kg (271 lb 9.7 oz) Bed scale 0   04/26/24 0751 (!) 160/90 98.5 °F (36.9 °C) 85 20 100 % -- -- -- --   04/26/24 0843 (!) 162/99 98.2 °F (36.8 °C) 85 20 -- -- -- -- --   04/26/24 0848 (!) 171/100 -- 84 -- -- -- -- -- --   04/26/24 0900 (!) 177/99 -- 86 -- -- -- -- -- --   04/26/24 0915 (!) 161/100 -- 85 -- -- -- -- -- --   04/26/24 0930 (!) 158/99 -- 82 -- -- -- -- -- --   04/26/24 0945 (!) 145/94 -- 81 -- -- -- -- -- --   04/26/24 1000 (!) 158/88 -- 80 -- -- -- -- -- --   04/26/24 1015 (!) 162/91 -- 83 -- -- -- -- -- --   04/26/24 1030 (!) 163/101 -- 79 -- -- -- -- -- --   04/26/24 1045 (!) 155/91 -- 79 -- -- -- -- -- --   04/26/24 1100 (!) 164/91 -- 77 -- -- -- -- -- --   04/26/24 1115 (!) 155/95 -- 76 -- -- -- -- -- --   04/26/24 1130 (!) 171/98 -- 81 -- -- -- -- -- --   04/26/24 1145 (!) 147/98 -- 76 -- -- -- -- -- --   04/26/24 1157 -- 98.3 °F (36.8 °C) -- -- -- -- -- -- --     Post-Dialysis  Arterial Catheter Locking Solution: Not Applicable  Venous Catheter Locking Solution: Not Applicable  Post-Treatment Procedures: Blood returned, Access bleeding time < 10 minutes  Machine Disinfection Process: Exterior Machine Disinfection  Rinseback Volume (ml): 300 ml  Blood Volume Processed (Liters): 60.3 L  Dialyzer Clearance: Lightly streaked  Duration of Treatment (minutes): 180 minutes     Hemodialysis Intake (ml): 500 ml  Hemodialysis Output (ml): 3500 ml     Tolerated Treatment: Good  Patient Response to Treatment: well  Physician Notified: No       Provider Notification        Handoff complete and report given to Primary RN at 1209

## 2024-04-26 NOTE — CONSULTS
[facility] Continence Nurse  Consult Note       Yovanny Levine  AGE: 48 y.o.   GENDER: male  : 1975  TODAY'S DATE:  2024    Subjective:     Reason for Evaluation and Assessment: wound vac change      Yovanny Levine is a 48 y.o. male referred by:   [] Physician  [] Nursing  [] Other:     Wound Identification:  Wound Type: non-healing/non-surgical  Contributing Factors: diabetes, poor glucose control, chronic pressure, shear force, obesity, and decreased tissue oxygenation        PAST MEDICAL HISTORY        Diagnosis Date    Abscess 2010    scrotal    Acute renal failure (ARF) ([facility]) 2019    Anxiety associated with depression     Anxiety associated with depression     Back pain 2012    CAD (coronary artery disease) 02/10/2023    Chest pain 2013    Diabetic nephropathy ([facility])     Diabetic neuropathy ([facility]) 2011    Diabetic ulcer of left midfoot associated with type 2 diabetes mellitus, with fat layer exposed ([facility]) 2017    Diverticulosis     C scope + Dr. Medina    DM (diabetes mellitus), type 2 ([facility])     DR. 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 Medina    Nausea and vomiting 2019    Necrotizing fasciitis ([facility])     Nose fracture 1988    Panic attacks     Pericarditis     Hospitalized with s/p heart cath normal    Peripheral autonomic neuropathy due to diabetes mellitus ([facility])     Axonal EMG- [facility], 2011    Sebaceous cyst 2011    URI (upper respiratory infection) 2012    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 type 2 diabetes mellitus, with muscle involvement without evidence of necrosis ([facility]) 2021    WD-Wound, surgical, infected, initial encounter 2017    Wrist fracture 1986       PAST SURGICAL  (cm) 0.8 cm 04/16/24 1446   Post-Procedure Width (cm) 0.9 cm 04/16/24 1446   Post-Procedure Depth (cm) 0.1 cm 04/16/24 1446   Post-Procedure Surface Area (cm^2) 0.72 cm^2 04/16/24 1446   Post-Procedure Volume (cm^3) 0.072 cm^3 04/16/24 1446   Distance Tunneling (cm) 0 cm 04/26/24 1431   Tunneling Position ___ O'Clock 0 04/26/24 1431   Undermining Starts ___ O'Clock 0 04/26/24 1431   Undermining Ends___ O'Clock 0 04/26/24 1431   Undermining Maxium Distance (cm) 0 04/26/24 1431   Wound Assessment Pink/red 04/26/24 1431   Drainage Amount Moderate (25-50%) 04/26/24 1431   Drainage Description Sanguinous 04/26/24 1431   Odor None 04/26/24 1431   Shazia-wound Assessment Maceration 04/26/24 1431   Margins Defined edges 04/26/24 1431   Wound Thickness Description not for Pressure Injury Full thickness 04/26/24 1431   Number of days: 66       Response to treatment:  Well tolerated by patient.     Pain Assessment:  Severity:  none  Quality of pain: none  Wound Pain Timing/Severity: none  Premedicated: none    Plan:     Plan of Care: Wound 02/20/24 Wound #1 Left Lateral Distal Foot-Dressing/Treatment: Negative pressure wound therapy  Wound 02/20/24 Wound #2 Left plantar-Dressing/Treatment: Alginate with Ag (covered with vac drape)    Alert and agreeable to wound vac change. Silver foam removed from wound bed, foul smelling wound  noted. Wound bed with clusters of dark tissue noted. Silver foam applied and covered with drape, bridged to ankle.  Attached to continuous suction at 125 MMHG. Shazia wound macerated slightly. Plantar wound covered with opticell ag and drape, bleeding moderate amount. Tolerated well.    Specialty Bed Required : no  [] Low Air Loss   [] Pressure Redistribution  [] Fluid Immersion  [] Bariatric  [] Total Pressure Relief  [] Other:     Discharge Plan:  Placement for patient upon discharge:  to be determined   Hospice Care: not at this time   Patient appropriate for Outpatient Wound Care Center: is current pt

## 2024-04-26 NOTE — CARE COORDINATION
Chart reviewed and patient discussed in IDR. Awaiting ID final recs. Plan is for patient to get IVAB at HD. Patient is active with [facility]. Has home wound vac that family will bring in. CM following.

## 2024-04-26 NOTE — PROGRESS NOTES
Nephrology Progress Note        [address] [facility], [address]  [place] [address]  Phone: [phone]  Office Hours: 8:30AM - 4:30PM  Monday - Friday 4/26/2024 8:49 AM  Subjective:   Admit Date: 4/22/2024  PCP: José Luis Izquierdo MD  Interval History:   On NC  Leg edema remains    Diet: ADULT DIET; Regular; 5 carb choices (75 gm/meal); Low Potassium (Less than 3000 mg/day); 1500 ml      Data:   Scheduled Meds:   sodium zirconium cyclosilicate  10 g Oral Once per day on Sun Tue Thu Sat    DAPTOmycin IV orderable  6 mg/kg (Adjusted) IntraVENous Q48H    epoetin paola-epbx  10,000 Units IntraVENous Once per day on Mon Wed Fri    ciprofloxacin  400 mg IntraVENous Once    amLODIPine  5 mg Oral Daily    atorvastatin  40 mg Oral Nightly    calcitRIOL  0.5 mcg Oral BID    hydrALAZINE  25 mg Oral 3 times per day    insulin glargine  15 Units SubCUTAneous Nightly    insulin lispro  10 Units SubCUTAneous TID WC    insulin lispro  0-8 Units SubCUTAneous TID WC    insulin lispro  0-4 Units SubCUTAneous Nightly    sodium chloride flush  5-40 mL IntraVENous 2 times per day    heparin (porcine)  5,000 Units SubCUTAneous 3 times per day    calcium acetate  2 capsule Oral TID WC    Vitamin D  2,000 Units Oral Daily     Continuous Infusions:   dextrose      sodium chloride 25 mL/hr at 04/24/24 1426     PRN Meds:calcium carbonate, oxyCODONE, prochlorperazine, albuterol sulfate HFA, glucose, dextrose bolus **OR** dextrose bolus, glucagon (rDNA), dextrose, sodium chloride flush, sodium chloride, ondansetron **OR** ondansetron, polyethylene glycol, acetaminophen **OR** acetaminophen  I/O last 3 completed shifts:  In: 480 [P.O.:480]  Out: 175 [Urine:100; Drains:75]  I/O this shift:  In: 240 [P.O.:240]  Out: -     Intake/Output Summary (Last 24 hours) at 4/26/2024 0849  Last data filed at 4/26/2024 0738  Gross per 24 hour   Intake 480 ml   Output 175 ml   Net 305 ml       CBC:   Recent Labs     04/23/24  0930 04/24/24  0350

## 2024-04-27 VITALS
TEMPERATURE: 97.6 F | SYSTOLIC BLOOD PRESSURE: 168 MMHG | OXYGEN SATURATION: 95 % | HEART RATE: 94 BPM | WEIGHT: 271.61 LBS | HEIGHT: 69 IN | DIASTOLIC BLOOD PRESSURE: 96 MMHG | BODY MASS INDEX: 40.23 KG/M2 | RESPIRATION RATE: 18 BRPM

## 2024-04-27 LAB
ALBUMIN SERPL-MCNC: 3.7 GM/DL (ref 3.4–5)
ALP BLD-CCNC: 70 IU/L (ref 40–128)
ALT SERPL-CCNC: <5 U/L (ref 10–40)
ANION GAP SERPL CALCULATED.3IONS-SCNC: 17 MMOL/L (ref 7–16)
AST SERPL-CCNC: 12 IU/L (ref 15–37)
BASOPHILS ABSOLUTE: 0 K/CU MM
BASOPHILS RELATIVE PERCENT: 0.5 % (ref 0–1)
BILIRUB SERPL-MCNC: 0.4 MG/DL (ref 0–1)
BUN SERPL-MCNC: 55 MG/DL (ref 6–23)
CALCIUM SERPL-MCNC: 9.3 MG/DL (ref 8.3–10.6)
CHLORIDE BLD-SCNC: 98 MMOL/L (ref 99–110)
CO2: 23 MMOL/L (ref 21–32)
CREAT SERPL-MCNC: 6.8 MG/DL (ref 0.9–1.3)
CRP SERPL HS-MCNC: 30.1 MG/L
CULTURE: ABNORMAL
CULTURE: NORMAL
CULTURE: NORMAL
DIFFERENTIAL TYPE: ABNORMAL
DOSE AMOUNT: NORMAL
DOSE TIME: NORMAL
EOSINOPHILS ABSOLUTE: 0.2 K/CU MM
EOSINOPHILS RELATIVE PERCENT: 3.9 % (ref 0–3)
GFR SERPL CREATININE-BSD FRML MDRD: 9 ML/MIN/1.73M2
GLUCOSE BLD-MCNC: 105 MG/DL (ref 70–99)
GLUCOSE BLD-MCNC: 158 MG/DL (ref 70–99)
GLUCOSE BLD-MCNC: 167 MG/DL (ref 70–99)
GLUCOSE SERPL-MCNC: 139 MG/DL (ref 70–99)
HCT VFR BLD CALC: 24.7 % (ref 42–52)
HEMOGLOBIN: 7.5 GM/DL (ref 13.5–18)
IMMATURE NEUTROPHIL %: 0.5 % (ref 0–0.43)
LYMPHOCYTES ABSOLUTE: 0.6 K/CU MM
LYMPHOCYTES RELATIVE PERCENT: 9.8 % (ref 24–44)
Lab: ABNORMAL
Lab: NORMAL
Lab: NORMAL
MCH RBC QN AUTO: 29.8 PG (ref 27–31)
MCHC RBC AUTO-ENTMCNC: 30.4 % (ref 32–36)
MCV RBC AUTO: 98 FL (ref 78–100)
MONOCYTES ABSOLUTE: 0.5 K/CU MM
MONOCYTES RELATIVE PERCENT: 8.6 % (ref 0–4)
NEUTROPHILS RELATIVE PERCENT: 76.7 % (ref 36–66)
NUCLEATED RBC %: 0 %
PDW BLD-RTO: 15.1 % (ref 11.7–14.9)
PLATELET # BLD: 159 K/CU MM (ref 140–440)
POTASSIUM SERPL-SCNC: 5.6 MMOL/L (ref 3.5–5.1)
RBC # BLD: 2.52 M/CU MM (ref 4.6–6.2)
SEGMENTED NEUTROPHILS ABSOLUTE COUNT: 4.4 K/CU MM
SODIUM BLD-SCNC: 138 MMOL/L (ref 135–145)
SPECIMEN: ABNORMAL
SPECIMEN: NORMAL
SPECIMEN: NORMAL
TOTAL IMMATURE NEUTOROPHIL: 0.03 K/CU MM
TOTAL NUCLEATED RBC: 0 K/CU MM
TOTAL PROTEIN: 6.8 GM/DL (ref 6.4–8.2)
TOTAL RETICULOCYTE COUNT: 0.06 K/CU MM
VANCOMYCIN RANDOM: 16.8 UG/ML
WBC # BLD: 5.7 K/CU MM (ref 4–10.5)

## 2024-04-27 PROCEDURE — 6370000000 HC RX 637 (ALT 250 FOR IP): Performed by: STUDENT IN AN ORGANIZED HEALTH CARE EDUCATION/TRAINING PROGRAM

## 2024-04-27 PROCEDURE — 82962 GLUCOSE BLOOD TEST: CPT

## 2024-04-27 PROCEDURE — 94761 N-INVAS EAR/PLS OXIMETRY MLT: CPT

## 2024-04-27 PROCEDURE — 6370000000 HC RX 637 (ALT 250 FOR IP): Performed by: INTERNAL MEDICINE

## 2024-04-27 PROCEDURE — 80053 COMPREHEN METABOLIC PANEL: CPT

## 2024-04-27 PROCEDURE — 2700000000 HC OXYGEN THERAPY PER DAY

## 2024-04-27 PROCEDURE — 6360000002 HC RX W HCPCS: Performed by: NURSE PRACTITIONER

## 2024-04-27 PROCEDURE — 85025 COMPLETE CBC W/AUTO DIFF WBC: CPT

## 2024-04-27 PROCEDURE — 80202 ASSAY OF VANCOMYCIN: CPT

## 2024-04-27 PROCEDURE — 2580000003 HC RX 258: Performed by: STUDENT IN AN ORGANIZED HEALTH CARE EDUCATION/TRAINING PROGRAM

## 2024-04-27 PROCEDURE — 6360000002 HC RX W HCPCS: Performed by: STUDENT IN AN ORGANIZED HEALTH CARE EDUCATION/TRAINING PROGRAM

## 2024-04-27 PROCEDURE — 2580000003 HC RX 258: Performed by: NURSE PRACTITIONER

## 2024-04-27 PROCEDURE — 2500000003 HC RX 250 WO HCPCS: Performed by: INTERNAL MEDICINE

## 2024-04-27 PROCEDURE — 36415 COLL VENOUS BLD VENIPUNCTURE: CPT

## 2024-04-27 PROCEDURE — 90935 HEMODIALYSIS ONE EVALUATION: CPT

## 2024-04-27 PROCEDURE — 86140 C-REACTIVE PROTEIN: CPT

## 2024-04-27 RX ORDER — OXYCODONE HYDROCHLORIDE AND ACETAMINOPHEN 5; 325 MG/1; MG/1
1 TABLET ORAL EVERY 6 HOURS PRN
Qty: 12 TABLET | Refills: 0 | Status: SHIPPED | OUTPATIENT
Start: 2024-04-27 | End: 2024-04-29 | Stop reason: SDUPTHER

## 2024-04-27 RX ORDER — VITAMIN B COMPLEX
5000 TABLET ORAL DAILY
Qty: 150 TABLET | Refills: 0 | Status: SHIPPED | OUTPATIENT
Start: 2024-04-27 | End: 2024-05-27

## 2024-04-27 RX ADMIN — CALCIUM ACETATE 1334 MG: 667 CAPSULE ORAL at 12:43

## 2024-04-27 RX ADMIN — SODIUM CHLORIDE, PRESERVATIVE FREE 10 ML: 5 INJECTION INTRAVENOUS at 12:45

## 2024-04-27 RX ADMIN — OXYCODONE HYDROCHLORIDE 10 MG: 10 TABLET ORAL at 04:13

## 2024-04-27 RX ADMIN — HEPARIN SODIUM 5000 UNITS: 5000 INJECTION INTRAVENOUS; SUBCUTANEOUS at 14:11

## 2024-04-27 RX ADMIN — INSULIN LISPRO 10 UNITS: 100 INJECTION, SOLUTION INTRAVENOUS; SUBCUTANEOUS at 17:12

## 2024-04-27 RX ADMIN — Medication 2000 UNITS: at 12:43

## 2024-04-27 RX ADMIN — CALCITRIOL CAPSULES 0.25 MCG 0.5 MCG: 0.25 CAPSULE ORAL at 12:42

## 2024-04-27 RX ADMIN — HYDRALAZINE HYDROCHLORIDE 25 MG: 25 TABLET ORAL at 14:11

## 2024-04-27 RX ADMIN — OXYCODONE HYDROCHLORIDE 10 MG: 10 TABLET ORAL at 14:12

## 2024-04-27 RX ADMIN — CALCIUM ACETATE 1334 MG: 667 CAPSULE ORAL at 17:11

## 2024-04-27 RX ADMIN — PROCHLORPERAZINE EDISYLATE 5 MG: 5 INJECTION INTRAMUSCULAR; INTRAVENOUS at 07:00

## 2024-04-27 RX ADMIN — SODIUM ZIRCONIUM CYCLOSILICATE 10 G: 10 POWDER, FOR SUSPENSION ORAL at 12:40

## 2024-04-27 RX ADMIN — HYDRALAZINE HYDROCHLORIDE 25 MG: 25 TABLET ORAL at 06:45

## 2024-04-27 RX ADMIN — AMLODIPINE BESYLATE 5 MG: 5 TABLET ORAL at 12:42

## 2024-04-27 RX ADMIN — VANCOMYCIN HYDROCHLORIDE 1750 MG: 5 INJECTION, POWDER, LYOPHILIZED, FOR SOLUTION INTRAVENOUS at 16:06

## 2024-04-27 RX ADMIN — HEPARIN SODIUM 5000 UNITS: 5000 INJECTION INTRAVENOUS; SUBCUTANEOUS at 06:50

## 2024-04-27 RX ADMIN — INSULIN LISPRO 10 UNITS: 100 INJECTION, SOLUTION INTRAVENOUS; SUBCUTANEOUS at 13:00

## 2024-04-27 ASSESSMENT — PAIN SCALES - GENERAL
PAINLEVEL_OUTOF10: 8

## 2024-04-27 ASSESSMENT — PAIN DESCRIPTION - ORIENTATION
ORIENTATION: LEFT

## 2024-04-27 ASSESSMENT — PAIN DESCRIPTION - DESCRIPTORS
DESCRIPTORS: ACHING;DISCOMFORT
DESCRIPTORS: ACHING;OTHER (COMMENT)
DESCRIPTORS: ACHING;DISCOMFORT

## 2024-04-27 ASSESSMENT — PAIN DESCRIPTION - LOCATION
LOCATION: FOOT

## 2024-04-27 ASSESSMENT — PAIN SCALES - WONG BAKER
WONGBAKER_NUMERICALRESPONSE: HURTS WHOLE LOT
WONGBAKER_NUMERICALRESPONSE: NO HURT
WONGBAKER_NUMERICALRESPONSE: HURTS WHOLE LOT

## 2024-04-27 ASSESSMENT — PAIN DESCRIPTION - FREQUENCY
FREQUENCY: CONTINUOUS

## 2024-04-27 ASSESSMENT — PAIN DESCRIPTION - PAIN TYPE
TYPE: SURGICAL PAIN

## 2024-04-27 ASSESSMENT — PAIN DESCRIPTION - ONSET
ONSET: ON-GOING

## 2024-04-27 NOTE — DISCHARGE INSTRUCTIONS
You get antibiotics or dialysis.  Please see infectious disease doctor within 1 week and your primary care doctor within 1 week.  Primary care doctor will give you further doses of pain medications    As discussed please avoid high potassium foods.

## 2024-04-27 NOTE — DISCHARGE SUMMARY
V2.0  Discharge Summary    Name:  Yovanny Levine /Age/Sex: 1975 (48 y.o. male)   Admit Date: 2024  Discharge Date: 24    MRN & CSN:  [id number] & [id number] Encounter Date and Time 24 2:15 PM EDT    Attending:  Eduard Medina MD Discharging Provider: Eduard Medina MD       Hospital Course:     Brief HPI: Yovanny Levine is a 48 y.o. male  who presents with worsening left foot wound.  Wound nurse noticed it when he was wound VAC was getting replaced.  Was asked by podiatrist to come to the hospital for possible surgical intervention.  He denies any fevers, chills, nausea, vomiting, chest pain or shortness of breath.  Reports today is his dialysis day.  Reports he has been taking his home ciprofloxacin as prescribed.     Brief Problem Based Course:     Left foot osteomyelitis  -Has been on ciprofloxacin since discharge last time based on prior cultures.  Wound now getting worse. .  Continue ciprofloxacin for now and await further surgical cultures as he has not had any systemic signs such as leukocytosis or fevers that are concerning for resistance.  -General surgery consulted.  Underwent surgical exploration and debridement on  wound VAC in place.  -BC -ve.  Surgical cultures reviewed..  Infectious disease consult: Plan for vancomycin with dialysis for 6-week cumulative course with end date of  as per their recommendations.  Discussed with nephrology infectious disease patient will get vancomycin at dialysis as per  schedule.  Home care arrangement made by social work for wound VAC.    ESRD on dialysis  -Nephrology consulted.  MWF dialysis as per nephrology.      Right lower lobe infiltrate  -Unclear etiology.  He is on room air and uses oxygen periodically at night for comfort.  we did a desaturation screen and he is 92% on room air.  No cough or sputum production noted.     Essential hypertension  -Continue home amlodipine, hydralazine.     CAD status post    Where to Get Your Medications        These medications [facility] [id number] - [facility] [address] - P [phone] - F [phone]   [facility] [address]      Phone: [phone]   oxyCODONE-acetaminophen 5-325 MG per tablet  Vitamin D 25 MCG (1000 UT) Tabs tablet        Objective Findings at Discharge:   BP (!) 180/92   Pulse 90   Temp 98.2 °F (36.8 °C) (Oral)   Resp 22   Ht 1.753 m (5' 9\")   Wt 123.2 kg (271 lb 9.7 oz)   SpO2 96%   BMI 40.11 kg/m²       Physical Exam:   General appearance: alert and cooperative with exam  Lungs: Not in respiratory distress.  Able to speak full sentences.  Lungs clear to auscultation.  Heart: regular rate and rhythm, S1, S2 normal, no murmur, click, rub or gallop  Abdomen: soft, non-tender; bowel sounds normal; no masses,  no organomegaly  Extremities: Extremities are not cyanotic.  Left foot covered with bandage.  Wound vac in place  Neurologic: Alert and oriented to time place and person.  Strength 5 out of 5 in bilateral upper and lower extremities.  Sensation to light touch is intact and equal in bilateral upper and lower extremities.        Labs and Imaging   XR CHEST PORTABLE    Result Date: 4/25/2024  PORTABLE AP CHEST AT 10:09 AM: REASON FOR EXAM: hypoxia COMPARISON: April 8, 2024 FINDINGS: 2 portable AP views of the chest demonstrate interval worsening of right basilar airspace disease with likely associated small amount of right pleural fluid. Mild left basilar airspace disease similar to prior exam. Normal pulmonary vascularity. Negative  for pneumothorax. Stable cardiomediastinal silhouette.     Interval worsening of right basilar airspace disease with likely associated small amount of right pleural fluid. Electronically signed by Osman Singh MD    XR FOOT LEFT (MIN 3 VIEWS)    Result Date: 4/22/2024  History: Diabetic wound of the left foot. COMPARISON: Left foot radiographs 3/12/2024. FINDINGS: AP, lateral,

## 2024-04-27 NOTE — PROGRESS NOTES
Occupational Therapy Treatment Note    Name: Yovanny Levine MRN: [id number] :   1975   Date:  2024   Admission Date: 2024 Room:  1103/1103-A     Attempted to see pt this afternoon but pt refusing therapy and is being d/c tonight. Pt educated on benefits of therapy. Pt educated change of position that relieve pain but pt still refusing. Will re-attempt as schedule allows.     Electronically signed by:    FAUZIA Thompson,   2024, 7:46 AM

## 2024-04-27 NOTE — CONSULTS
PHARMACY VANCOMYCIN MONITORING SERVICE  Pharmacy consulted by Lalitha Almodovar NP for monitoring and adjustment.    Indication for treatment: Vancomycin indication: Bone/Joint infection   Goal trough: Trough Goal: 15-20 mcg/mL  AUC/PAMELA: 400-600    Risk Factors for MRSA Identified:   Hospitalization within the past 90 days, Patient on hemodialysis    Pertinent Laboratory Values:   Temp Readings from Last 3 Encounters:   04/27/24 98.4 °F (36.9 °C)   04/16/24 99 °F (37.2 °C) (Temporal)   04/11/24 97.3 °F (36.3 °C) (Oral)     Recent Labs     04/25/24  0250 04/27/24  0200   WBC 6.3 5.7     Recent Labs     04/25/24  0250 04/27/24  0200   BUN 47* 55*   CREATININE 6.5* 6.8*     Estimated Creatinine Clearance: 17 mL/min (A) (based on SCr of 6.8 mg/dL (H)).    Intake/Output Summary (Last 24 hours) at 4/27/2024 1035  Last data filed at 4/27/2024 0630  Gross per 24 hour   Intake 750 ml   Output 3650 ml   Net -2900 ml     Last Encounter Weight:  Wt Readings from Last 3 Encounters:   04/26/24 123.2 kg (271 lb 9.7 oz)   04/08/24 124.8 kg (275 lb 3.2 oz)   03/12/24 115.7 kg (255 lb)       Pertinent Cultures:   Date    Source    Results  4/22   Blood    No growth  4/22   Wound   E. Faecium, C. Striatum  4/23                            Tissue                         E. Faecum, C. Striatum    Vancomycin level:   TROUGH:  No results for input(s): \"VANCOTROUGH\" in the last 72 hours.  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 Faecium resistant to ampicillin, sensitive to vancomycin  SCr, BUN, and urine output: ESRD on HD M/W/F, pt did receive dialysis today (Saturday) due to hypovolemia  Day(s) of therapy: 2 (ID notes vanco to continue until 6/4/24)  Vancomycin concentration:   4/27 - 16.8 before dialysis - redose  4/29 - pre-HD @06:00    Plan:  Intermittent vancomycin dosing based on levels due to ESRD  Give

## 2024-04-27 NOTE — CARE COORDINATION
Chart reviewed. Pt to discharge later today post dialysis with [facility] and wound vac from home. Pt will receive IVAB at HD per ID and nephrology notes. Needs [facility] orders. PS sent requesting inpt HC orders for PT/OT/Skilled RN with wound care specifics listed.

## 2024-04-27 NOTE — PROGRESS NOTES
Nephrology  Dialysis Note        [address] [facility], [address] [place] [address]  Phone: [phone]  Office Hours: 8:30AM - 4:30PM  Monday - Friday          PROCEDURE:  Patient seen during hemodialysis      PHYSICIAN:  ANTHONY      INDICATION:  End-stage renal disease      RX:  See dialysis flowsheet for specifics on access, blood flow rate, dialysate baths, duration of dialysis, anticoagulation and other technical information.      COMMENTS:  TOLERATING HD, SET TO LOSE 2.5KG, BP HOLDING WELL WHILE ON HD    Electronically signed by Melody James DO on 4/27/2024 at 9:35 AM    [facility]  MD SAMANTHA SMITH DO  [address] [facility] [address],  [address] [place] [address]  PHONE: [phone]  FAX: [phone]

## 2024-04-27 NOTE — PROGRESS NOTES
Nephrology Progress Note        [address] [facility], [address]  [place] [address]  Phone: [phone]  Office Hours: 8:30AM - 4:30PM  Monday - Friday 4/27/2024 7:44 AM  Subjective:   Admit Date: 4/22/2024  PCP: José Luis Izquierdo MD  Interval History:   On NC  No complaints this am    Diet: ADULT DIET; Regular; 5 carb choices (75 gm/meal); Low Potassium (Less than 3000 mg/day); 1500 ml      Data:   Scheduled Meds:   vancomycin (VANCOCIN) intermittent dosing (placeholder)   Other RX Placeholder    sodium zirconium cyclosilicate  10 g Oral Once per day on Sun Tue Thu Sat    epoetin paola-epbx  10,000 Units IntraVENous Once per day on Mon Wed Fri    amLODIPine  5 mg Oral Daily    atorvastatin  40 mg Oral Nightly    calcitRIOL  0.5 mcg Oral BID    hydrALAZINE  25 mg Oral 3 times per day    insulin glargine  15 Units SubCUTAneous Nightly    insulin lispro  10 Units SubCUTAneous TID WC    insulin lispro  0-8 Units SubCUTAneous TID WC    insulin lispro  0-4 Units SubCUTAneous Nightly    sodium chloride flush  5-40 mL IntraVENous 2 times per day    heparin (porcine)  5,000 Units SubCUTAneous 3 times per day    calcium acetate  2 capsule Oral TID WC    Vitamin D  2,000 Units Oral Daily     Continuous Infusions:   dextrose      sodium chloride 25 mL/hr at 04/24/24 1426     PRN Meds:calcium carbonate, oxyCODONE, prochlorperazine, albuterol sulfate HFA, glucose, dextrose bolus **OR** dextrose bolus, glucagon (rDNA), dextrose, sodium chloride flush, sodium chloride, ondansetron **OR** ondansetron, polyethylene glycol, acetaminophen **OR** acetaminophen  I/O last 3 completed shifts:  In: 750 [P.O.:240; I.V.:10]  Out: 3600 [Urine:100]  No intake/output data recorded.    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

## 2024-04-27 NOTE — PROGRESS NOTES
04/27/24 1015 350 ml/min 1000 ml/hr 1985 ml -140 mmHg 190 90 700 no distress noted, resting with eyes closed Yes   04/27/24 1030 350 ml/min 1050 ml/hr 2244 ml -140 mmHg 180 90 700 pt resting Yes   04/27/24 1045 350 ml/min 1050 ml/hr 2496 ml -140 mmHg 190 100 700 no needs Yes   04/27/24 1100 350 ml/min 1050 ml/hr 2768 ml -140 mmHg 190 100 700 resting Yes   04/27/24 1115 -- -- 3000 ml -- -- -- -- tx complete --     Vital Signs  Patient Vitals for the past 8 hrs:   BP Temp Pulse Resp SpO2   04/27/24 0413 -- -- -- 22 --   04/27/24 0443 -- -- -- 20 --   04/27/24 0630 (!) 172/102 -- 88 22 95 %   04/27/24 0808 (!) 169/92 98.4 °F (36.9 °C) 92 -- --   04/27/24 0814 (!) 169/91 -- 91 -- --   04/27/24 0830 (!) 160/97 -- 90 -- --   04/27/24 0845 (!) 166/92 -- 93 -- --   04/27/24 0900 (!) 161/90 -- 91 -- --   04/27/24 0915 (!) 176/99 -- 91 -- --   04/27/24 0930 (!) 164/93 -- 90 -- --   04/27/24 0945 (!) 163/86 -- 87 -- --   04/27/24 1000 (!) 148/94 -- 81 -- --   04/27/24 1015 136/78 -- 79 -- --   04/27/24 1030 (!) 150/86 -- 79 -- --   04/27/24 1045 (!) 149/96 -- 83 -- --   04/27/24 1100 (!) 163/82 -- 80 -- --   04/27/24 1115 (!) 161/88 98.2 °F (36.8 °C) 80 -- --     Post-Dialysis  Arterial Catheter Locking Solution: Not Applicable  Venous Catheter Locking Solution: Not Applicable  Post-Treatment Procedures: Blood returned, Access bleeding time < 10 minutes  Machine Disinfection Process: Acid/Vinegar Clean, Heat Disinfect, Exterior Machine Disinfection  Rinseback Volume (ml): 300 ml  Blood Volume Processed (Liters): 56.3 L  Dialyzer Clearance: Lightly streaked  Duration of Treatment (minutes): 180 minutes     Hemodialysis Intake (ml): 500 ml  Hemodialysis Output (ml): 3000 ml     Tolerated Treatment: Good  Patient Response to Treatment: well  Physician Notified: Yes       Provider Notification        Handoff complete and report given to Primary RN at 1120 hours.  Primary RN (First Initial, Last Name, Title):  CARMELA Palmer LPN      Education  Person Educated: Patient   Knowledge Base: Substantial  Barriers to Learning?: None  Preferred method of Learning: Oral  Topic(s): Access Care, Signs and Symptoms of Infection, Fluid Management, Albumin, Procedural, Medications, Treatment Options, Potassium, Diet, and Transplant   Teaching Tools: Explanation   Response to Education: Verbalized Understanding and Requires Follow-up     Electronically signed by Lola Marx RN on 4/27/2024 at 11:22 AM

## 2024-04-27 NOTE — PROGRESS NOTES
This nurse reviewed discharge instructions with patient. Patient verbalized understanding at this time.  IV removed before discharge without complications.  Patient tolerated procedure well. Family here at bedside ready to take patient home.  This  nurse did call [facility] to tell them that patient will be discharging today.  All paperwork was faxed over to them. This nurse did apply patient's home wound vac on before discharge.  This  nurse used w/c to take patient out to front lobby to meet family.  No other concerns voiced.

## 2024-04-29 DIAGNOSIS — E11.621 DIABETIC ULCER OF LEFT MIDFOOT ASSOCIATED WITH TYPE 2 DIABETES MELLITUS, UNSPECIFIED ULCER STAGE (HCC): ICD-10-CM

## 2024-04-29 DIAGNOSIS — L97.429 DIABETIC ULCER OF LEFT MIDFOOT ASSOCIATED WITH TYPE 2 DIABETES MELLITUS, UNSPECIFIED ULCER STAGE (HCC): ICD-10-CM

## 2024-04-29 LAB
CULTURE: ABNORMAL
GRAM SMEAR: ABNORMAL
Lab: ABNORMAL
SPECIMEN: ABNORMAL

## 2024-04-29 NOTE — TELEPHONE ENCOUNTER
Patient called he has surgery amputation on 4/23. He is asking for a refill of pain medication. His primary care can't get him in to refill for 2 weeks.

## 2024-04-30 ENCOUNTER — HOSPITAL ENCOUNTER (OUTPATIENT)
Dept: WOUND CARE | Age: 49
Discharge: HOME | End: 2024-04-30
Attending: STUDENT IN AN ORGANIZED HEALTH CARE EDUCATION/TRAINING PROGRAM
Payer: MEDICARE

## 2024-04-30 VITALS
TEMPERATURE: 97.2 F | RESPIRATION RATE: 18 BRPM | DIASTOLIC BLOOD PRESSURE: 83 MMHG | HEART RATE: 97 BPM | SYSTOLIC BLOOD PRESSURE: 165 MMHG

## 2024-04-30 DIAGNOSIS — L97.425 DIABETIC ULCER OF LEFT MIDFOOT ASSOCIATED WITH DIABETES MELLITUS DUE TO UNDERLYING CONDITION, WITH MUSCLE INVOLVEMENT WITHOUT EVIDENCE OF NECROSIS (HCC): ICD-10-CM

## 2024-04-30 DIAGNOSIS — E11.621 DIABETIC ULCER OF LEFT MIDFOOT ASSOCIATED WITH TYPE 2 DIABETES MELLITUS, UNSPECIFIED ULCER STAGE (HCC): ICD-10-CM

## 2024-04-30 DIAGNOSIS — E66.01 MORBID OBESITY WITH BODY MASS INDEX (BMI) OF 40.0 TO 44.9 IN ADULT (HCC): ICD-10-CM

## 2024-04-30 DIAGNOSIS — E08.621 DIABETIC ULCER OF LEFT MIDFOOT ASSOCIATED WITH DIABETES MELLITUS DUE TO UNDERLYING CONDITION, WITH MUSCLE INVOLVEMENT WITHOUT EVIDENCE OF NECROSIS (HCC): ICD-10-CM

## 2024-04-30 DIAGNOSIS — Z99.2 ESRD (END STAGE RENAL DISEASE) ON DIALYSIS (HCC): Primary | ICD-10-CM

## 2024-04-30 DIAGNOSIS — E11.21 DIABETIC NEPHROPATHY ASSOCIATED WITH TYPE 2 DIABETES MELLITUS (HCC): ICD-10-CM

## 2024-04-30 DIAGNOSIS — Z78.9 PRESENCE OF SURGICAL INCISION: ICD-10-CM

## 2024-04-30 DIAGNOSIS — L97.429 DIABETIC ULCER OF LEFT MIDFOOT ASSOCIATED WITH TYPE 2 DIABETES MELLITUS, UNSPECIFIED ULCER STAGE (HCC): ICD-10-CM

## 2024-04-30 DIAGNOSIS — N18.6 ESRD (END STAGE RENAL DISEASE) ON DIALYSIS (HCC): Primary | ICD-10-CM

## 2024-04-30 PROBLEM — L97.505 DIABETIC ULCER OF FOOT WITH MUSCLE INVOLVEMENT WITHOUT EVIDENCE OF NECROSIS (HCC): Status: ACTIVE | Noted: 2024-04-30

## 2024-04-30 PROCEDURE — 11042 DBRDMT SUBQ TIS 1ST 20SQCM/<: CPT | Performed by: NURSE PRACTITIONER

## 2024-04-30 PROCEDURE — 11043 DBRDMT MUSC&/FSCA 1ST 20/<: CPT

## 2024-04-30 PROCEDURE — 11046 DBRDMT MUSC&/FSCA EA ADDL: CPT

## 2024-04-30 PROCEDURE — 11046 DBRDMT MUSC&/FSCA EA ADDL: CPT | Performed by: NURSE PRACTITIONER

## 2024-04-30 PROCEDURE — 11043 DBRDMT MUSC&/FSCA 1ST 20/<: CPT | Performed by: NURSE PRACTITIONER

## 2024-04-30 PROCEDURE — 11042 DBRDMT SUBQ TIS 1ST 20SQCM/<: CPT

## 2024-04-30 PROCEDURE — 97605 NEG PRS WND THER DME<=50SQCM: CPT

## 2024-04-30 RX ORDER — OXYCODONE HYDROCHLORIDE AND ACETAMINOPHEN 5; 325 MG/1; MG/1
1 TABLET ORAL EVERY 6 HOURS PRN
Qty: 28 TABLET | Refills: 0 | Status: SHIPPED | OUTPATIENT
Start: 2024-04-30 | End: 2024-05-07

## 2024-04-30 RX ORDER — OXYCODONE HYDROCHLORIDE AND ACETAMINOPHEN 5; 325 MG/1; MG/1
1 TABLET ORAL EVERY 6 HOURS PRN
Qty: 15 TABLET | Refills: 0 | Status: SHIPPED | OUTPATIENT
Start: 2024-04-30 | End: 2024-04-30 | Stop reason: SDUPTHER

## 2024-04-30 ASSESSMENT — PAIN DESCRIPTION - LOCATION: LOCATION: FOOT

## 2024-04-30 ASSESSMENT — PAIN SCALES - WONG BAKER: WONGBAKER_NUMERICALRESPONSE: HURTS WORST

## 2024-04-30 ASSESSMENT — PAIN DESCRIPTION - DESCRIPTORS: DESCRIPTORS: SHARP

## 2024-04-30 ASSESSMENT — PAIN DESCRIPTION - ONSET: ONSET: ON-GOING

## 2024-04-30 ASSESSMENT — PAIN DESCRIPTION - ORIENTATION: ORIENTATION: LEFT

## 2024-04-30 ASSESSMENT — PAIN DESCRIPTION - PAIN TYPE: TYPE: SURGICAL PAIN

## 2024-04-30 ASSESSMENT — PAIN - FUNCTIONAL ASSESSMENT: PAIN_FUNCTIONAL_ASSESSMENT: PREVENTS OR INTERFERES WITH MANY ACTIVE NOT PASSIVE ACTIVITIES

## 2024-04-30 ASSESSMENT — PAIN SCALES - GENERAL: PAINLEVEL_OUTOF10: 9

## 2024-04-30 ASSESSMENT — PAIN DESCRIPTION - FREQUENCY: FREQUENCY: CONTINUOUS

## 2024-04-30 NOTE — PROGRESS NOTES
Wound Care Center Progress Visit      Yovanny Levine  AGE: 48 y.o.   GENDER: male  : 1975  EPISODE DATE:  2024   Referred by: José Luis Izquierdo MD     Subjective:     CHIEF COMPLAINT  WOUND   Problem List Items Addressed This Visit          Endocrine    Diabetic nephropathy associated with type 2 diabetes mellitus (HCC)    Diabetic ulcer of foot with muscle involvement without evidence of necrosis (HCC)       Genitourinary    ESRD (end stage renal disease) on dialysis (HCC) - Primary       Other    Morbid obesity with body mass index (BMI) of 40.0 to 44.9 in adult (HCC)    Presence of surgical incision     Chief Complaint   Patient presents with    Wound Check        HISTORY of PRESENT ILLNESS      Yovanny Levine is a 48 y.o. male who presents to the Wound Clinic for evaluation and treatment of Chronic diabetic  and  surgical ulcer(s) of the left foot. The condition is of marked severity. The patient has been seeing Dr. Webber at the wound clinic since 24. He was hospitalized -24  Sepsis secondary to left foot cellulitis and chronic left diabetic foot ulcer. General surgery consulted, patient underwent incision and drainage of left foot  and again 2/10/24, IV antibiotics given, home on Augmentin through 3/20/24.  Hospitalized 3/12-3/20/24 with diabetic foot infection, IV antibiotics, switched to oral Cipro. Hospitalized -24 with I&D and foot debridement per Dr. Forrest 24 with wound vac placement. Osteomyelitis, infectious disease consult: plan for vancomycin with dialysis for 6-week cumulative course with end date of 24. The patient has significant underlying medical conditions as below. José Luis Izquierdo MD is PCP.    Wound Pain Timing/Severity: waxing and waning, moderate  Quality of pain: shooting, pins and needles  Severity of pain:  5 / 10   Modifying Factors: diabetes, chronic pressure, shear force, obesity, and non-adherence  Associated Signs/Symptoms: edema, 
Santyl per physician order to wound amp site.  Patient tolerated the procedure well.     Electronically signed by Shila Carroll LPN on 4/30/2024 at 4:13 PM

## 2024-04-30 NOTE — PATIENT INSTRUCTIONS
PHYSICIAN ORDERS AND DISCHARGE INSTRUCTIONS     Wound cleansing:              Do not scrub or use excessive force.             Wash hands with soap and water before and after dressing changes.             Prior to applying a clean dressing, cleanse wound with normal saline,               wound cleanser, or mild soap and water.              Ask the physician or nurse before getting the wound(s) wet in a shower                         Grafts:             Wound Care Notes:  Rx:    Cultures:    Sees Dr Kumar.                                         Orders for this week: 2024     Fax to [facility]!    Left Lateral Proximal plantar (smaller) wound - wash with soap and water, pat dry.  Steri strips to bolster edges of wound.  Apply Sorbact to the wound bed, followed by Santyl and Stimulen.  Double Qwick to periwound to offload.  Cover with ABD or super absorber and secure with vac drape.     Left Lateral Distal plantar (larger) wound: WOUND VAC THERAPY:  SKIN PREP OR MASTISOL, THEN DUODERM TO PERIWOUND FOR PROTECTION.   APPLY SORBACT TO WOUND BED, THEN SANTYL, STIMULEN, and BLACK FOAM TO WOUND. SECURE VAC DRESSING WITH DRAPE.  SET WOUND VAC  CONTINUOUS SUCTION.   CANISTER CHANGE WEEKLY OR ACCORDING TO VOLUME OF DRAINAGE.  Wrap with ACE, but do not compress vac tubing against skin.    WOUND VAC DRESSING TO BE CHANGED BY HOME CARE ON  AND SATURDAY (OR ).  WOUND CARE CENTER WILL CHANGE ON  (NEED TO BRING VAC SUPPLIES TO APPOINTMENTS - CANISTER AND BLACK FOAM SUCTION KIT).        Follow Up Instructions: At the Wound Care Center in 1 week  Primary Wound Care Provider: Dr Forrest  Call  for any questions or concerns.  Central [phone] for imaging lab work

## 2024-04-30 NOTE — PLAN OF CARE
Problem: Chronic Conditions and Co-morbidities  Goal: Patient's chronic conditions and co-morbidity symptoms are monitored and maintained or improved  Outcome: Progressing     Problem: Pain  Goal: Verbalizes/displays adequate comfort level or baseline comfort level  Outcome: Progressing  Flowsheets (Taken 4/30/2024 1921 by Rosie Carpio LPN)  Verbalizes/displays adequate comfort level or baseline comfort level: Encourage patient to monitor pain and request assistance

## 2024-04-30 NOTE — TELEPHONE ENCOUNTER
Pt called today 04/30/24 wanting to know about if he can get pain meds. He said he is in a lot of pain.

## 2024-05-06 ENCOUNTER — TELEPHONE (OUTPATIENT)
Dept: WOUND CARE | Age: 49
End: 2024-05-06

## 2024-05-06 NOTE — TELEPHONE ENCOUNTER
Call from Letha MOSES at [facility] stating that patient's vac dressing failed after a few hours during weekend visit. She was not able to get a new dressing to seal due to maceration and states client is not offloading like he needs to. Family was going to try to assist with reinforcing dressing and ended up doing a wet to dry. Agency is having issues obtaining skin prep/mastisol to get a better seal. Will discuss alternative interventions to maintain vac integrity during appointment tomorrow.    Electronically signed by Fernando Cooper RN on 5/6/2024 at 1:17 PM

## 2024-05-07 ENCOUNTER — HOSPITAL ENCOUNTER (OUTPATIENT)
Dept: WOUND CARE | Age: 49
Discharge: HOME | End: 2024-05-07
Attending: STUDENT IN AN ORGANIZED HEALTH CARE EDUCATION/TRAINING PROGRAM
Payer: MEDICARE

## 2024-05-07 VITALS
TEMPERATURE: 98.4 F | RESPIRATION RATE: 20 BRPM | DIASTOLIC BLOOD PRESSURE: 92 MMHG | HEART RATE: 85 BPM | SYSTOLIC BLOOD PRESSURE: 162 MMHG

## 2024-05-07 PROCEDURE — 11046 DBRDMT MUSC&/FSCA EA ADDL: CPT

## 2024-05-07 PROCEDURE — 97605 NEG PRS WND THER DME<=50SQCM: CPT

## 2024-05-07 PROCEDURE — 11043 DBRDMT MUSC&/FSCA 1ST 20/<: CPT

## 2024-05-07 ASSESSMENT — PAIN DESCRIPTION - ORIENTATION: ORIENTATION: LEFT

## 2024-05-07 ASSESSMENT — PAIN DESCRIPTION - FREQUENCY: FREQUENCY: CONTINUOUS

## 2024-05-07 ASSESSMENT — PAIN DESCRIPTION - DESCRIPTORS: DESCRIPTORS: SHARP

## 2024-05-07 ASSESSMENT — PAIN DESCRIPTION - ONSET: ONSET: ON-GOING

## 2024-05-07 ASSESSMENT — PAIN - FUNCTIONAL ASSESSMENT: PAIN_FUNCTIONAL_ASSESSMENT: PREVENTS OR INTERFERES SOME ACTIVE ACTIVITIES AND ADLS

## 2024-05-07 ASSESSMENT — PAIN DESCRIPTION - LOCATION: LOCATION: LEG

## 2024-05-07 ASSESSMENT — PAIN SCALES - GENERAL: PAINLEVEL_OUTOF10: 7

## 2024-05-07 ASSESSMENT — PAIN DESCRIPTION - PAIN TYPE: TYPE: SURGICAL PAIN

## 2024-05-07 NOTE — PROGRESS NOTES
pressure. This can be achieved with crutches, wheel chair, knee walker etc. Minimizing pressure through partial weight bearing (minimizing the amount of  pressure applied and or the amount of time on the area of pressure) or maintaining a non-weight bearing status can be used to promote and often can be essential for thee wound to heal. Off loading may also need to be achieved for non-weight bearing wounds such as pressure ulcers to the torso. Turning and changing positions frequently, at least every two hours. Use of pressure cushion if sitting up in chair.     Skin Care  Keep skin clean and well moisturized , moisturize routinely with ointments for heavier moisturizer needs for extremely dry skin or cracks such as A&D ointment and lotions for a light moisturizer such as CeraVe or Eucerin. If incontinent change incontinence garments as soon as soiled and keeping skin clean and use barrier cream to protect the skin.    Reduce Salt and Sodium  Choose low- or reduced- sodium, or no-salt-added versions of foods and condiments when available. Buy fresh, plain frozen, or canned with “no-added-salt” vegetables. Use fresh poultry, fish and lean meat, rather than canned, smoked or processed types. Choose ready-to-eat breakfast cereals that are lower in sodium. Limit cured foods (such as alvarez and ham), foods packed in brine (such as pickled foods) and condiments (such as MSG, mustard, horseradish, and catsup). Limit even lower sodium versions of soy sauce and teriyaki sauce-treat these condiments just like salt). In cooking and at the table, flavor foods with herbs, spices, lemon, lime, vinegar or salt-free seasoning blends. Start by cutting salt in half. Cook rice, pasta and hot cereals without salt. Cut back on instant or flavored rice, pasta and cereal mixes, which usually have added salt. Choose “convenience” foods that are lower in sodium. Limit frozen dinners, packaged mixes, canned soups and dressings. Rinse canned

## 2024-05-07 NOTE — WOUND CARE
WOUND VAC THERAPY:     DUODERM TO PERIWOUND FOR PROTECTION. APPLY BLACK FOAM TO WOUND    MAY USE MEPITEL TO WOUND BED TO PREVENT BLACK FOAM FROM ADHERING TO WOUND BED. SECURE VAC. DRESSING WITH DRAPE.    SET WOUND VAC  CONTINUOUS SUCTION. CANISTER CHANGE WITH EACH DRESSING CHANGE OR ACCORDING TO VOLUME OF DRAINAGE.    WOUND VAC DRESSING TO BE CHANGED MON, WED, FRI

## 2024-05-07 NOTE — WOUND CARE
Nonselective enzymatic debridement performed with Santyl per physician order to wound(s) of the Left Distal, and Left Plantar wounds   Patient tolerated the procedure well.

## 2024-05-07 NOTE — PATIENT INSTRUCTIONS
PHYSICIAN ORDERS AND DISCHARGE INSTRUCTIONS     Wound cleansing:              Do not scrub or use excessive force.             Wash hands with soap and water before and after dressing changes.             Prior to applying a clean dressing, cleanse wound with normal saline,               wound cleanser, or mild soap and water.              Ask the physician or nurse before getting the wound(s) wet in a shower                         Grafts:             Wound Care Notes:  Rx:    Cultures:    Sees Dr Kumar.                                         Orders for this week: 2024     Fax to [facility]! Reapply wound vac if it becomes disconnected ASAP     Left Lateral Proximal plantar (smaller) wound - wash with soap and water, pat dry.  Apply Sorbact to the wound bed, followed by Santyl and Stimulen.  Double Qwick to periwound to offload.  Cover with ABD or super absorber and secure with vac drape.     Left Lateral Distal plantar (larger) wound: WOUND VAC THERAPY:  SKIN PREP OR MASTISOL, THEN DUODERM TO PERIWOUND FOR PROTECTION.   APPLY SORBACT TO WOUND BED, THEN SANTYL, STIMULEN, and BLACK FOAM TO WOUND. SECURE VAC DRESSING WITH DRAPE.  SET WOUND VAC  CONTINUOUS SUCTION.   CANISTER CHANGE WEEKLY OR ACCORDING TO VOLUME OF DRAINAGE.  Wrap with ACE, but do not compress vac tubing against skin.  WOUND VAC DRESSING TO BE CHANGED BY HOME CARE ON  AND SATURDAY (OR ).  WOUND CARE CENTER WILL CHANGE ON  (NEED TO BRING VAC SUPPLIES TO APPOINTMENTS - CANISTER AND BLACK FOAM SUCTION KIT).        Follow Up Instructions: At the Wound Care Center in 1 week  Primary Wound Care Provider: Dr Forrest  Call  for any questions or concerns.  Central [phone] for imaging lab work

## 2024-05-14 ENCOUNTER — HOSPITAL ENCOUNTER (OUTPATIENT)
Dept: WOUND CARE | Age: 49
Discharge: HOME | End: 2024-05-14
Attending: STUDENT IN AN ORGANIZED HEALTH CARE EDUCATION/TRAINING PROGRAM

## 2024-05-14 NOTE — PATIENT INSTRUCTIONS
PHYSICIAN ORDERS AND DISCHARGE INSTRUCTIONS     Wound cleansing:              Do not scrub or use excessive force.             Wash hands with soap and water before and after dressing changes.             Prior to applying a clean dressing, cleanse wound with normal saline,               wound cleanser, or mild soap and water.              Ask the physician or nurse before getting the wound(s) wet in a shower                         Grafts:             Wound Care Notes:  Rx:    Cultures:    Sees Dr Kumar.                                         Orders for this week: 2024     Fax to [facility]! Reapply wound vac if it becomes disconnected ASAP even if macerated    Left Lateral Proximal plantar (smaller) wound - wash with soap and water, pat dry.  Apply Sorbact to the wound bed, followed by Santyl and Stimulen.  Double Qwick to periwound to offload.  Cover with ABD or super absorber and secure with vac drape.     Left Lateral Distal plantar (larger) wound: WOUND VAC THERAPY:  SKIN PREP OR MASTISOL, THEN DUODERM TO PERIWOUND FOR PROTECTION.   APPLY SORBACT TO WOUND BED, SANTYL, STIMULEN, and BLACK FOAM TO WOUND. SECURE VAC DRESSING WITH DRAPE.  SET WOUND VAC  CONTINUOUS SUCTION.   CANISTER CHANGE WEEKLY OR ACCORDING TO VOLUME OF DRAINAGE.  Wrap with ACE, but do not compress vac tubing against skin.  WOUND VAC DRESSING TO BE CHANGED BY HOME CARE ON  AND SATURDAY (OR ).  WOUND CARE CENTER WILL CHANGE ON  (NEED TO BRING VAC SUPPLIES TO APPOINTMENTS - CANISTER AND BLACK FOAM SUCTION KIT).        Follow Up Instructions: At the Wound Care Center in 1 week  Primary Wound Care Provider: Dr Forrest  Call  for any questions or concerns.  Central [phone] for imaging lab work

## 2024-05-15 ENCOUNTER — TELEPHONE (OUTPATIENT)
Dept: WOUND CARE | Age: 49
End: 2024-05-15

## 2024-05-15 NOTE — TELEPHONE ENCOUNTER
Danielle at [facility] called to report that client tripped during the night pulling vac dressing off of foot. They are sending a nurse out to reapply the vac this morning.    Electronically signed by Fernando Cooper RN on 5/15/2024 at 8:59 AM

## 2024-05-16 ENCOUNTER — HOSPITAL ENCOUNTER (OUTPATIENT)
Dept: WOUND CARE | Age: 49
Discharge: HOME | End: 2024-05-16
Attending: STUDENT IN AN ORGANIZED HEALTH CARE EDUCATION/TRAINING PROGRAM

## 2024-05-16 NOTE — PATIENT INSTRUCTIONS
PHYSICIAN ORDERS AND DISCHARGE INSTRUCTIONS     Wound cleansing:              Do not scrub or use excessive force.             Wash hands with soap and water before and after dressing changes.             Prior to applying a clean dressing, cleanse wound with normal saline,               wound cleanser, or mild soap and water.              Ask the physician or nurse before getting the wound(s) wet in a shower                         Grafts:             Wound Care Notes:  Rx:    Cultures:    Sees Dr Kumar.                                         Orders for this week: 2024     Fax to [facility]! Reapply wound vac if it becomes disconnected ASAP     Left Lateral Proximal plantar (smaller) wound - wash with soap and water, pat dry.  Apply Sorbact to the wound bed, followed by Santyl and Stimulen.  Double Qwick to periwound to offload.  Cover with ABD or super absorber and secure with vac drape.     Left Lateral Distal plantar (larger) wound: WOUND VAC THERAPY:  SKIN PREP OR MASTISOL, THEN DUODERM TO PERIWOUND FOR PROTECTION.   APPLY SORBACT TO WOUND BED, SANTYL, STIMULEN, and BLACK FOAM TO WOUND. SECURE VAC DRESSING WITH DRAPE.  SET WOUND VAC  CONTINUOUS SUCTION.   CANISTER CHANGE WEEKLY OR ACCORDING TO VOLUME OF DRAINAGE.  Wrap with ACE, but do not compress vac tubing against skin.  WOUND VAC DRESSING TO BE CHANGED BY HOME CARE ON  AND SATURDAY (OR ).  WOUND CARE CENTER WILL CHANGE ON  (NEED TO BRING VAC SUPPLIES TO APPOINTMENTS - CANISTER AND BLACK FOAM SUCTION KIT).        Follow Up Instructions: At the Wound Care Center in 1 week  Primary Wound Care Provider: Dr Forrest  Call  for any questions or concerns.  Central [phone] for imaging lab work

## 2024-05-20 ENCOUNTER — TELEPHONE (OUTPATIENT)
Dept: WOUND CARE | Age: 49
End: 2024-05-20

## 2024-05-20 NOTE — TELEPHONE ENCOUNTER
Home care nurse Tir[place] called to report that client had missed wound clinic appointment on Thursday and when home care went out on Saturday, patient's foot was macerated with a foul odor. Wound was redressed with available supplies. Client is scheduled to come in tomorrow (Tuesday 5/21/24).    ,Electronically signed by Fernando Cooper RN on 5/20/2024 at 12:14 PM

## 2024-05-21 ENCOUNTER — HOSPITAL ENCOUNTER (OUTPATIENT)
Dept: WOUND CARE | Age: 49
Discharge: HOME | End: 2024-05-21
Attending: STUDENT IN AN ORGANIZED HEALTH CARE EDUCATION/TRAINING PROGRAM
Payer: MEDICARE

## 2024-05-21 VITALS
RESPIRATION RATE: 20 BRPM | TEMPERATURE: 98 F | SYSTOLIC BLOOD PRESSURE: 179 MMHG | DIASTOLIC BLOOD PRESSURE: 96 MMHG | HEART RATE: 90 BPM

## 2024-05-21 DIAGNOSIS — E11.621 DIABETIC ULCER OF MIDFOOT ASSOCIATED WITH TYPE 2 DIABETES MELLITUS, WITH MUSCLE INVOLVEMENT WITHOUT EVIDENCE OF NECROSIS, UNSPECIFIED LATERALITY (HCC): Primary | ICD-10-CM

## 2024-05-21 DIAGNOSIS — Z89.422 ABSENCE OF TOE OF LEFT FOOT (HCC): ICD-10-CM

## 2024-05-21 DIAGNOSIS — L97.405 DIABETIC ULCER OF MIDFOOT ASSOCIATED WITH TYPE 2 DIABETES MELLITUS, WITH MUSCLE INVOLVEMENT WITHOUT EVIDENCE OF NECROSIS, UNSPECIFIED LATERALITY (HCC): Primary | ICD-10-CM

## 2024-05-21 PROCEDURE — 97605 NEG PRS WND THER DME<=50SQCM: CPT

## 2024-05-21 PROCEDURE — 11042 DBRDMT SUBQ TIS 1ST 20SQCM/<: CPT

## 2024-05-21 PROCEDURE — 11045 DBRDMT SUBQ TISS EACH ADDL: CPT

## 2024-05-21 RX ORDER — BETAMETHASONE DIPROPIONATE 0.5 MG/G
CREAM TOPICAL ONCE
OUTPATIENT
Start: 2024-05-21 | End: 2024-05-21

## 2024-05-21 RX ORDER — LIDOCAINE HYDROCHLORIDE 20 MG/ML
JELLY TOPICAL ONCE
Status: CANCELLED | OUTPATIENT
Start: 2024-05-21 | End: 2024-05-21

## 2024-05-21 RX ORDER — BACITRACIN ZINC AND POLYMYXIN B SULFATE 500; 1000 [USP'U]/G; [USP'U]/G
OINTMENT TOPICAL ONCE
Status: CANCELLED | OUTPATIENT
Start: 2024-05-21 | End: 2024-05-21

## 2024-05-21 RX ORDER — LIDOCAINE HYDROCHLORIDE 20 MG/ML
JELLY TOPICAL ONCE
OUTPATIENT
Start: 2024-05-21 | End: 2024-05-21

## 2024-05-21 RX ORDER — LIDOCAINE 40 MG/G
CREAM TOPICAL ONCE
Status: CANCELLED | OUTPATIENT
Start: 2024-05-21 | End: 2024-05-21

## 2024-05-21 RX ORDER — IBUPROFEN 200 MG
TABLET ORAL ONCE
Status: CANCELLED | OUTPATIENT
Start: 2024-05-21 | End: 2024-05-21

## 2024-05-21 RX ORDER — SODIUM CHLOR/HYPOCHLOROUS ACID 0.033 %
SOLUTION, IRRIGATION IRRIGATION ONCE
OUTPATIENT
Start: 2024-05-21 | End: 2024-05-21

## 2024-05-21 RX ORDER — LIDOCAINE HYDROCHLORIDE 40 MG/ML
SOLUTION TOPICAL ONCE
OUTPATIENT
Start: 2024-05-21 | End: 2024-05-21

## 2024-05-21 RX ORDER — GENTAMICIN SULFATE 1 MG/G
OINTMENT TOPICAL ONCE
OUTPATIENT
Start: 2024-05-21 | End: 2024-05-21

## 2024-05-21 RX ORDER — BETAMETHASONE DIPROPIONATE 0.5 MG/G
CREAM TOPICAL ONCE
Status: CANCELLED | OUTPATIENT
Start: 2024-05-21 | End: 2024-05-21

## 2024-05-21 RX ORDER — LIDOCAINE 50 MG/G
OINTMENT TOPICAL ONCE
OUTPATIENT
Start: 2024-05-21 | End: 2024-05-21

## 2024-05-21 RX ORDER — CLOBETASOL PROPIONATE 0.5 MG/G
OINTMENT TOPICAL ONCE
Status: CANCELLED | OUTPATIENT
Start: 2024-05-21 | End: 2024-05-21

## 2024-05-21 RX ORDER — LIDOCAINE 50 MG/G
OINTMENT TOPICAL ONCE
Status: CANCELLED | OUTPATIENT
Start: 2024-05-21 | End: 2024-05-21

## 2024-05-21 RX ORDER — CLOBETASOL PROPIONATE 0.5 MG/G
OINTMENT TOPICAL ONCE
OUTPATIENT
Start: 2024-05-21 | End: 2024-05-21

## 2024-05-21 RX ORDER — TRIAMCINOLONE ACETONIDE 1 MG/G
OINTMENT TOPICAL ONCE
Status: CANCELLED | OUTPATIENT
Start: 2024-05-21 | End: 2024-05-21

## 2024-05-21 RX ORDER — LIDOCAINE HYDROCHLORIDE 40 MG/ML
SOLUTION TOPICAL ONCE
Status: CANCELLED | OUTPATIENT
Start: 2024-05-21 | End: 2024-05-21

## 2024-05-21 RX ORDER — BACITRACIN ZINC 500 [USP'U]/G
OINTMENT TOPICAL ONCE
OUTPATIENT
Start: 2024-05-21 | End: 2024-05-21

## 2024-05-21 RX ORDER — BACITRACIN ZINC 500 [USP'U]/G
OINTMENT TOPICAL ONCE
Status: CANCELLED | OUTPATIENT
Start: 2024-05-21 | End: 2024-05-21

## 2024-05-21 RX ORDER — LIDOCAINE 40 MG/G
CREAM TOPICAL ONCE
OUTPATIENT
Start: 2024-05-21 | End: 2024-05-21

## 2024-05-21 RX ORDER — TRIAMCINOLONE ACETONIDE 1 MG/G
OINTMENT TOPICAL ONCE
OUTPATIENT
Start: 2024-05-21 | End: 2024-05-21

## 2024-05-21 RX ORDER — SODIUM CHLOR/HYPOCHLOROUS ACID 0.033 %
SOLUTION, IRRIGATION IRRIGATION ONCE
Status: CANCELLED | OUTPATIENT
Start: 2024-05-21 | End: 2024-05-21

## 2024-05-21 RX ORDER — BACITRACIN ZINC AND POLYMYXIN B SULFATE 500; 1000 [USP'U]/G; [USP'U]/G
OINTMENT TOPICAL ONCE
OUTPATIENT
Start: 2024-05-21 | End: 2024-05-21

## 2024-05-21 RX ORDER — IBUPROFEN 200 MG
TABLET ORAL ONCE
OUTPATIENT
Start: 2024-05-21 | End: 2024-05-21

## 2024-05-21 RX ORDER — GENTAMICIN SULFATE 1 MG/G
OINTMENT TOPICAL ONCE
Status: CANCELLED | OUTPATIENT
Start: 2024-05-21 | End: 2024-05-21

## 2024-05-21 ASSESSMENT — PAIN DESCRIPTION - LOCATION: LOCATION: LEG

## 2024-05-21 ASSESSMENT — PAIN DESCRIPTION - ORIENTATION: ORIENTATION: LEFT

## 2024-05-21 ASSESSMENT — PAIN DESCRIPTION - DESCRIPTORS: DESCRIPTORS: SHARP

## 2024-05-21 ASSESSMENT — PAIN SCALES - GENERAL: PAINLEVEL_OUTOF10: 8

## 2024-05-21 ASSESSMENT — PAIN DESCRIPTION - FREQUENCY: FREQUENCY: CONTINUOUS

## 2024-05-21 ASSESSMENT — PAIN DESCRIPTION - PAIN TYPE: TYPE: SURGICAL PAIN

## 2024-05-21 ASSESSMENT — PAIN DESCRIPTION - ONSET: ONSET: ON-GOING

## 2024-05-21 ASSESSMENT — PAIN - FUNCTIONAL ASSESSMENT: PAIN_FUNCTIONAL_ASSESSMENT: PREVENTS OR INTERFERES SOME ACTIVE ACTIVITIES AND ADLS

## 2024-05-21 NOTE — PROGRESS NOTES
RN on 5/21/2024 at 11:59 AM  
Pink/red;Granulation tissue;Slough 05/21/24 1136   Drainage Amount Large (50-75% saturated) 05/21/24 1136   Drainage Description Serosanguinous 05/21/24 1136   Odor Moderate 05/21/24 1136   Shazia-wound Assessment Hyperkeratosis (callous);Maceration 05/21/24 1136   Margins Defined edges 05/21/24 1136   Wound Thickness Description not for Pressure Injury Full thickness 05/21/24 1136   Number of days: 90       Wound 02/20/24 Wound #2 Left plantar (Active)   Wound Image   05/07/24 1101   Wound Etiology Diabetic 04/30/24 1450   Dressing Status New dressing applied;Dry;Clean;Intact 05/07/24 1255   Wound Cleansed Wound cleanser 05/21/24 1136   Dressing/Treatment Alginate with Ag 04/27/24 1228   Offloading for Diabetic Foot Ulcers Post op shoe 05/21/24 1136   Wound Length (cm) 0.3 cm 05/21/24 1136   Wound Width (cm) 0.3 cm 05/21/24 1136   Wound Depth (cm) 0.1 cm 05/21/24 1136   Wound Surface Area (cm^2) 0.09 cm^2 05/21/24 1136   Change in Wound Size % (l*w) 64 05/21/24 1136   Wound Volume (cm^3) 0.009 cm^3 05/21/24 1136   Wound Healing % 93 05/21/24 1136   Post-Procedure Length (cm) 0.3 cm 05/21/24 1146   Post-Procedure Width (cm) 0.3 cm 05/21/24 1146   Post-Procedure Depth (cm) 0.1 cm 05/21/24 1146   Post-Procedure Surface Area (cm^2) 0.09 cm^2 05/21/24 1146   Post-Procedure Volume (cm^3) 0.009 cm^3 05/21/24 1146   Distance Tunneling (cm) 0 cm 05/21/24 1136   Tunneling Position ___ O'Clock 0 05/21/24 1136   Undermining Starts ___ O'Clock 0 05/21/24 1136   Undermining Ends___ O'Clock 0 05/21/24 1136   Undermining Maxium Distance (cm) 0 05/21/24 1136   Wound Assessment Pink/red 05/21/24 1136   Drainage Amount Moderate (25-50%) 05/21/24 1136   Drainage Description Serosanguinous 05/21/24 1136   Odor None 05/21/24 1136   Shazia-wound Assessment Hyperkeratosis (callous) 05/21/24 1136   Margins Defined edges 05/21/24 1136   Wound Thickness Description not for Pressure Injury Full thickness 05/21/24 1136   Number of days: 90

## 2024-05-22 ENCOUNTER — HOSPITAL ENCOUNTER (EMERGENCY)
Age: 49
Discharge: HOME | End: 2024-05-22
Attending: STUDENT IN AN ORGANIZED HEALTH CARE EDUCATION/TRAINING PROGRAM
Payer: MEDICARE

## 2024-05-22 VITALS
OXYGEN SATURATION: 100 % | HEIGHT: 69 IN | WEIGHT: 260 LBS | HEART RATE: 87 BPM | SYSTOLIC BLOOD PRESSURE: 156 MMHG | TEMPERATURE: 98 F | RESPIRATION RATE: 16 BRPM | DIASTOLIC BLOOD PRESSURE: 91 MMHG | BODY MASS INDEX: 38.51 KG/M2

## 2024-05-22 DIAGNOSIS — E13.621: ICD-10-CM

## 2024-05-22 DIAGNOSIS — L97.425: ICD-10-CM

## 2024-05-22 DIAGNOSIS — T14.8XXA CHRONIC WOUND: Primary | ICD-10-CM

## 2024-05-22 LAB
ANION GAP SERPL CALCULATED.3IONS-SCNC: 18 MMOL/L (ref 7–16)
BASOPHILS ABSOLUTE: 0.1 K/CU MM
BASOPHILS RELATIVE PERCENT: 0.8 % (ref 0–1)
BUN SERPL-MCNC: 59 MG/DL (ref 6–23)
CALCIUM SERPL-MCNC: 7.9 MG/DL (ref 8.3–10.6)
CHLORIDE BLD-SCNC: 97 MMOL/L (ref 99–110)
CO2: 24 MMOL/L (ref 21–32)
CREAT SERPL-MCNC: 8.7 MG/DL (ref 0.9–1.3)
DIFFERENTIAL TYPE: ABNORMAL
EOSINOPHILS ABSOLUTE: 0.1 K/CU MM
EOSINOPHILS RELATIVE PERCENT: 1.8 % (ref 0–3)
GFR, ESTIMATED: 7 ML/MIN/1.73M2
GLUCOSE SERPL-MCNC: 176 MG/DL (ref 70–99)
HCT VFR BLD CALC: 31.2 % (ref 42–52)
HEMOGLOBIN: 9.6 GM/DL (ref 13.5–18)
IMMATURE NEUTROPHIL %: 0.5 % (ref 0–0.43)
LYMPHOCYTES ABSOLUTE: 0.6 K/CU MM
LYMPHOCYTES RELATIVE PERCENT: 8.6 % (ref 24–44)
MCH RBC QN AUTO: 29.7 PG (ref 27–31)
MCHC RBC AUTO-ENTMCNC: 30.8 % (ref 32–36)
MCV RBC AUTO: 96.6 FL (ref 78–100)
MONOCYTES ABSOLUTE: 0.5 K/CU MM
MONOCYTES RELATIVE PERCENT: 6.7 % (ref 0–4)
NEUTROPHILS ABSOLUTE: 6 K/CU MM
NEUTROPHILS RELATIVE PERCENT: 81.6 % (ref 36–66)
NUCLEATED RBC %: 0 %
PDW BLD-RTO: 17.5 % (ref 11.7–14.9)
PLATELET # BLD: 164 K/CU MM (ref 140–440)
PMV BLD AUTO: 10.5 FL (ref 7.5–11.1)
POTASSIUM SERPL-SCNC: 4.6 MMOL/L (ref 3.5–5.1)
RBC # BLD: 3.23 M/CU MM (ref 4.6–6.2)
SODIUM BLD-SCNC: 139 MMOL/L (ref 135–145)
TOTAL IMMATURE NEUTOROPHIL: 0.04 K/CU MM
TOTAL NUCLEATED RBC: 0 K/CU MM
WBC # BLD: 7.4 K/CU MM (ref 4–10.5)

## 2024-05-22 PROCEDURE — 85025 COMPLETE CBC W/AUTO DIFF WBC: CPT

## 2024-05-22 PROCEDURE — 6360000002 HC RX W HCPCS: Performed by: STUDENT IN AN ORGANIZED HEALTH CARE EDUCATION/TRAINING PROGRAM

## 2024-05-22 PROCEDURE — 99284 EMERGENCY DEPT VISIT MOD MDM: CPT

## 2024-05-22 PROCEDURE — 80048 BASIC METABOLIC PNL TOTAL CA: CPT

## 2024-05-22 PROCEDURE — 97605 NEG PRS WND THER DME<=50SQCM: CPT

## 2024-05-22 PROCEDURE — 96374 THER/PROPH/DIAG INJ IV PUSH: CPT

## 2024-05-22 RX ORDER — MORPHINE SULFATE 4 MG/ML
4 INJECTION, SOLUTION INTRAMUSCULAR; INTRAVENOUS ONCE
Status: COMPLETED | OUTPATIENT
Start: 2024-05-22 | End: 2024-05-22

## 2024-05-22 RX ORDER — HYDROCODONE BITARTRATE AND ACETAMINOPHEN 10; 325 MG/1; MG/1
1 TABLET ORAL EVERY 6 HOURS PRN
Qty: 12 TABLET | Refills: 0 | Status: SHIPPED | OUTPATIENT
Start: 2024-05-22 | End: 2024-05-25

## 2024-05-22 RX ADMIN — MORPHINE SULFATE 4 MG: 4 INJECTION, SOLUTION INTRAMUSCULAR; INTRAVENOUS at 10:30

## 2024-05-22 ASSESSMENT — PAIN SCALES - GENERAL
PAINLEVEL_OUTOF10: 4
PAINLEVEL_OUTOF10: 9

## 2024-05-22 ASSESSMENT — LIFESTYLE VARIABLES
HOW MANY STANDARD DRINKS CONTAINING ALCOHOL DO YOU HAVE ON A TYPICAL DAY: PATIENT DOES NOT DRINK
HOW OFTEN DO YOU HAVE A DRINK CONTAINING ALCOHOL: NEVER

## 2024-05-22 ASSESSMENT — PAIN - FUNCTIONAL ASSESSMENT: PAIN_FUNCTIONAL_ASSESSMENT: 0-10

## 2024-05-22 NOTE — CONSULTS
Wound Etiology Diabetic 05/22/24 1030   Dressing Status New dressing applied;Dry;Clean;Intact 05/07/24 1255   Wound Cleansed Cleansed with saline 05/22/24 1030   Dressing/Treatment Hydrofiber Ag 05/22/24 1030   Offloading for Diabetic Foot Ulcers Post op shoe 05/21/24 1136   Wound Length (cm) 0.3 cm 05/22/24 1030   Wound Width (cm) 0.3 cm 05/22/24 1030   Wound Depth (cm) 0.1 cm 05/22/24 1030   Wound Surface Area (cm^2) 0.09 cm^2 05/22/24 1030   Change in Wound Size % (l*w) 64 05/22/24 1030   Wound Volume (cm^3) 0.009 cm^3 05/22/24 1030   Wound Healing % 93 05/22/24 1030   Post-Procedure Length (cm) 0.3 cm 05/21/24 1146   Post-Procedure Width (cm) 0.3 cm 05/21/24 1146   Post-Procedure Depth (cm) 0.1 cm 05/21/24 1146   Post-Procedure Surface Area (cm^2) 0.09 cm^2 05/21/24 1146   Post-Procedure Volume (cm^3) 0.009 cm^3 05/21/24 1146   Distance Tunneling (cm) 0 cm 05/22/24 1030   Tunneling Position ___ O'Clock 0 05/22/24 1030   Undermining Starts ___ O'Clock 0 05/22/24 1030   Undermining Ends___ O'Clock 0 05/22/24 1030   Undermining Maxium Distance (cm) 0 05/22/24 1030   Wound Assessment Dry;Pink/red 05/22/24 1030   Drainage Amount None (dry) 05/22/24 1030   Drainage Description Serosanguinous 05/21/24 1136   Odor None 05/22/24 1030   Shazia-wound Assessment Hyperkeratosis (callous) 05/22/24 1030   Margins Defined edges 05/22/24 1030   Wound Thickness Description not for Pressure Injury Full thickness 05/22/24 1030   Number of days: 91       Response to treatment:  With complaints of pain.     Pain Assessment:  Severity:  moderate  Quality of pain: sharp  Wound Pain Timing/Severity: intermittent  Premedicated: yes    Plan:     Plan of Care: Wound 02/20/24 Wound #1 Left Lateral Distal Foot-Dressing/Treatment: Negative pressure wound therapy (sorbact)  Wound 02/20/24 Wound #2 Left plantar-Dressing/Treatment: Hydrofiber [place] (drape)    Pt seen in ED. Came to ED due to wound vac being pulled off last night. Active with

## 2024-05-22 NOTE — ED PROVIDER NOTES
18.0 GM/DL    Hematocrit 31.2 (L) 42 - 52 %    MCV 96.6 78 - 100 FL    MCH 29.7 27 - 31 PG    MCHC 30.8 (L) 32.0 - 36.0 %    RDW 17.5 (H) 11.7 - 14.9 %    Platelets 164 140 - 440 K/CU MM    MPV 10.5 7.5 - 11.1 FL    Differential Type AUTOMATED DIFFERENTIAL     Neutrophils % 81.6 (H) 36 - 66 %    Lymphocytes % 8.6 (L) 24 - 44 %    Monocytes % 6.7 (H) 0 - 4 %    Eosinophils % 1.8 0 - 3 %    Basophils % 0.8 0 - 1 %    Neutrophils Absolute 6.0 K/CU MM    Lymphocytes Absolute 0.6 K/CU MM    Monocytes Absolute 0.5 K/CU MM    Eosinophils Absolute 0.1 K/CU MM    Basophils Absolute 0.1 K/CU MM    Nucleated RBC % 0.0 %    Total Nucleated RBC 0.0 K/CU MM    Total Immature Neutrophil 0.04 K/CU MM    Immature Neutrophil % 0.5 (H) 0 - 0.43 %   Basic Metabolic Panel   Result Value Ref Range    Sodium 139 135 - 145 MMOL/L    Potassium 4.6 3.5 - 5.1 MMOL/L    Chloride 97 (L) 99 - 110 mMol/L    CO2 24 21 - 32 MMOL/L    Anion Gap 18 (H) 7 - 16    Glucose 176 (H) 70 - 99 MG/DL    BUN 59 (H) 6 - 23 MG/DL    Creatinine 8.7 (H) 0.9 - 1.3 MG/DL    Est, Glom Filt Rate 7 (L) >60 mL/min/1.73m2    Calcium 7.9 (L) 8.3 - 10.6 MG/DL         Radiologic Studies:   If obtained, pertinent radiology studies and findings discussed and MDM.    Medical Decision Making     Patient was triaged by nursing staff and I reviewed vital signs.  Available medical records were reviewed including nursing notes, past medical history, past surgical history, family history and social history.  History obtained by patient and/or family without any significant limitations.    MDM:     On initial exam patient resting in bed in no acute distress.  Patient is afebrile and remaining vitals within normal limits.  Focused exam shows a ulcer to lateral base of the left foot.  There appears to be more chronic skin changes than any acute cellulitic findings.  No purulence appreciated.  Ankle and calf are nonerythematous.    Clinically left lower extremity does not resemble

## 2024-05-22 NOTE — ED TRIAGE NOTES
Wound vac fell off, called wound center, states they told him to come to the ER to be replaced. Also supposed to have dialysis today, but missing it d/t the wound vac being off. C/o pain, rating at 9 out of 10.

## 2024-05-22 NOTE — DISCHARGE INSTRUCTIONS
Continue to follow-up outpatient with wound clinic.  Return to emergency department if you develop new or worsening symptoms.

## 2024-05-23 ENCOUNTER — HOSPITAL ENCOUNTER (EMERGENCY)
Age: 49
Discharge: HOME | End: 2024-05-24
Payer: MEDICARE

## 2024-05-23 ENCOUNTER — APPOINTMENT (OUTPATIENT)
Dept: GENERAL RADIOLOGY | Age: 49
End: 2024-05-23
Payer: MEDICARE

## 2024-05-23 ENCOUNTER — APPOINTMENT (OUTPATIENT)
Dept: ULTRASOUND IMAGING | Age: 49
End: 2024-05-23
Payer: MEDICARE

## 2024-05-23 DIAGNOSIS — M79.605 LEFT LEG PAIN: Primary | ICD-10-CM

## 2024-05-23 LAB
ALBUMIN SERPL-MCNC: 3.8 GM/DL (ref 3.4–5)
ALP BLD-CCNC: 83 IU/L (ref 40–129)
ALT SERPL-CCNC: 57 U/L (ref 10–40)
ANION GAP SERPL CALCULATED.3IONS-SCNC: 16 MMOL/L (ref 7–16)
AST SERPL-CCNC: 79 IU/L (ref 15–37)
BASOPHILS ABSOLUTE: 0.1 K/CU MM
BASOPHILS RELATIVE PERCENT: 0.7 % (ref 0–1)
BILIRUB SERPL-MCNC: 0.5 MG/DL (ref 0–1)
BUN SERPL-MCNC: 48 MG/DL (ref 6–23)
CALCIUM SERPL-MCNC: 7.9 MG/DL (ref 8.3–10.6)
CHLORIDE BLD-SCNC: 94 MMOL/L (ref 99–110)
CO2: 27 MMOL/L (ref 21–32)
CREAT SERPL-MCNC: 7.1 MG/DL (ref 0.9–1.3)
DIFFERENTIAL TYPE: ABNORMAL
EOSINOPHILS ABSOLUTE: 0.1 K/CU MM
EOSINOPHILS RELATIVE PERCENT: 0.8 % (ref 0–3)
GFR, ESTIMATED: 9 ML/MIN/1.73M2
GLUCOSE SERPL-MCNC: 200 MG/DL (ref 70–99)
HCT VFR BLD CALC: 29 % (ref 42–52)
HEMOGLOBIN: 9.1 GM/DL (ref 13.5–18)
IMMATURE NEUTROPHIL %: 0.4 % (ref 0–0.43)
LYMPHOCYTES ABSOLUTE: 0.5 K/CU MM
LYMPHOCYTES RELATIVE PERCENT: 6.9 % (ref 24–44)
MCH RBC QN AUTO: 29.4 PG (ref 27–31)
MCHC RBC AUTO-ENTMCNC: 31.4 % (ref 32–36)
MCV RBC AUTO: 93.5 FL (ref 78–100)
MONOCYTES ABSOLUTE: 0.4 K/CU MM
MONOCYTES RELATIVE PERCENT: 4.9 % (ref 0–4)
NEUTROPHILS ABSOLUTE: 6.5 K/CU MM
NEUTROPHILS RELATIVE PERCENT: 86.3 % (ref 36–66)
NUCLEATED RBC %: 0 %
PDW BLD-RTO: 17.4 % (ref 11.7–14.9)
PLATELET # BLD: 138 K/CU MM (ref 140–440)
PMV BLD AUTO: 10.6 FL (ref 7.5–11.1)
POTASSIUM SERPL-SCNC: 4.7 MMOL/L (ref 3.5–5.1)
RBC # BLD: 3.1 M/CU MM (ref 4.6–6.2)
SODIUM BLD-SCNC: 137 MMOL/L (ref 135–145)
TOTAL IMMATURE NEUTOROPHIL: 0.03 K/CU MM
TOTAL NUCLEATED RBC: 0 K/CU MM
TOTAL PROTEIN: 7.2 GM/DL (ref 6.4–8.2)
WBC # BLD: 7.6 K/CU MM (ref 4–10.5)

## 2024-05-23 PROCEDURE — 99284 EMERGENCY DEPT VISIT MOD MDM: CPT

## 2024-05-23 PROCEDURE — 6360000002 HC RX W HCPCS: Performed by: PHYSICIAN ASSISTANT

## 2024-05-23 PROCEDURE — 96376 TX/PRO/DX INJ SAME DRUG ADON: CPT

## 2024-05-23 PROCEDURE — 85025 COMPLETE CBC W/AUTO DIFF WBC: CPT

## 2024-05-23 PROCEDURE — 93971 EXTREMITY STUDY: CPT

## 2024-05-23 PROCEDURE — 96374 THER/PROPH/DIAG INJ IV PUSH: CPT

## 2024-05-23 PROCEDURE — 73630 X-RAY EXAM OF FOOT: CPT

## 2024-05-23 PROCEDURE — 96375 TX/PRO/DX INJ NEW DRUG ADDON: CPT

## 2024-05-23 PROCEDURE — 80053 COMPREHEN METABOLIC PANEL: CPT

## 2024-05-23 RX ORDER — MORPHINE SULFATE 4 MG/ML
4 INJECTION, SOLUTION INTRAMUSCULAR; INTRAVENOUS
Status: DISCONTINUED | OUTPATIENT
Start: 2024-05-23 | End: 2024-05-24 | Stop reason: HOSPADM

## 2024-05-23 RX ADMIN — MORPHINE SULFATE 4 MG: 4 INJECTION, SOLUTION INTRAMUSCULAR; INTRAVENOUS at 22:18

## 2024-05-23 RX ADMIN — MORPHINE SULFATE 4 MG: 4 INJECTION, SOLUTION INTRAMUSCULAR; INTRAVENOUS at 23:34

## 2024-05-23 ASSESSMENT — PAIN SCALES - GENERAL
PAINLEVEL_OUTOF10: 8
PAINLEVEL_OUTOF10: 9
PAINLEVEL_OUTOF10: 9

## 2024-05-23 ASSESSMENT — PAIN DESCRIPTION - ORIENTATION
ORIENTATION: LEFT

## 2024-05-23 ASSESSMENT — PAIN DESCRIPTION - LOCATION
LOCATION: FOOT;LEG
LOCATION: LEG;FOOT
LOCATION: LEG

## 2024-05-23 ASSESSMENT — PAIN DESCRIPTION - DESCRIPTORS
DESCRIPTORS: SHARP;SHOOTING;DISCOMFORT
DESCRIPTORS: BURNING;SHARP
DESCRIPTORS: ACHING;CRUSHING

## 2024-05-23 ASSESSMENT — PAIN - FUNCTIONAL ASSESSMENT: PAIN_FUNCTIONAL_ASSESSMENT: 0-10

## 2024-05-24 ENCOUNTER — TELEPHONE (OUTPATIENT)
Dept: SURGERY | Age: 49
End: 2024-05-24

## 2024-05-24 VITALS
DIASTOLIC BLOOD PRESSURE: 91 MMHG | TEMPERATURE: 97.6 F | RESPIRATION RATE: 13 BRPM | OXYGEN SATURATION: 100 % | HEART RATE: 87 BPM | SYSTOLIC BLOOD PRESSURE: 160 MMHG

## 2024-05-24 PROCEDURE — 6360000002 HC RX W HCPCS: Performed by: PHYSICIAN ASSISTANT

## 2024-05-24 PROCEDURE — 6370000000 HC RX 637 (ALT 250 FOR IP): Performed by: PHYSICIAN ASSISTANT

## 2024-05-24 RX ORDER — PREGABALIN 75 MG/1
75 CAPSULE ORAL ONCE
Status: COMPLETED | OUTPATIENT
Start: 2024-05-24 | End: 2024-05-24

## 2024-05-24 RX ORDER — HYDROCODONE BITARTRATE AND ACETAMINOPHEN 5; 325 MG/1; MG/1
1 TABLET ORAL EVERY 4 HOURS PRN
Qty: 20 TABLET | Refills: 0 | Status: SHIPPED | OUTPATIENT
Start: 2024-05-24 | End: 2024-05-29

## 2024-05-24 RX ADMIN — PREGABALIN 75 MG: 75 CAPSULE ORAL at 01:00

## 2024-05-24 RX ADMIN — HYDROMORPHONE HYDROCHLORIDE 0.5 MG: 1 INJECTION, SOLUTION INTRAMUSCULAR; INTRAVENOUS; SUBCUTANEOUS at 01:01

## 2024-05-24 ASSESSMENT — PAIN SCALES - GENERAL
PAINLEVEL_OUTOF10: 5
PAINLEVEL_OUTOF10: 8

## 2024-05-24 ASSESSMENT — PAIN DESCRIPTION - ORIENTATION: ORIENTATION: LEFT

## 2024-05-24 ASSESSMENT — PAIN DESCRIPTION - LOCATION: LOCATION: FOOT;LEG

## 2024-05-24 ASSESSMENT — PAIN DESCRIPTION - DESCRIPTORS: DESCRIPTORS: BURNING;SHOOTING

## 2024-05-24 NOTE — ED NOTES
Pt oxygen was down in the 85% range, patient stated he having trouble breathing. Pt head of bed raised to high fowlers and oxygen applied to 3 liters. Pt stated he wears 3 liters of oxygen at home. Pt stated he would like to have the Bipap because it feels like he suffocating when falls asleep/wakes up. RN notified of the patient condition and request. O2 stat is 97% with applied oxygen and head of bed raised.

## 2024-05-24 NOTE — ED TRIAGE NOTES
Per patient left toe amputation w/increasing pain radiating up lower left leg. Patient has a wound vac on left foot.

## 2024-05-24 NOTE — TELEPHONE ENCOUNTER
Yovanny called stating he is having increased Pain of Left leg with shooting to the groin. Has Been in ER Wed and Thurs (5/21 & 5/22).    Message sent to Dr Forrest  Spoke with Dr Forrest, sent in Ruffin and wants to bump up surgery for next week.    Called Yovanny back and informed him.

## 2024-05-24 NOTE — ED PROVIDER NOTES
performed by Jose Maria Cornleius MD at [facility] ENDOSCOPY    UPPER GASTROINTESTINAL ENDOSCOPY N/A 9/4/2023    EGD BIOPSY performed by Mia AMBRIZ MD at [facility] ENDOSCOPY       CURRENTMEDICATIONS       Previous Medications    ALBUTEROL SULFATE HFA (VENTOLIN HFA) 108 (90 BASE) MCG/ACT INHALER    Inhale 2 puffs into the lungs 4 times daily as needed for Wheezing    AMLODIPINE (NORVASC) 5 MG TABLET    Take 1 tablet by mouth daily    ATORVASTATIN (LIPITOR) 40 MG TABLET    Take 1 tablet by mouth nightly    CALCITRIOL (ROCALTROL) 0.5 MCG CAPSULE    Take 1 capsule by mouth 2 times daily    CALCIUM CARBONATE (TUMS) 500 MG CHEWABLE TABLET    Take 2 tablets by mouth 3 times daily (with meals)    CARVEDILOL (COREG) 25 MG TABLET    Take 1 tablet by mouth 2 times daily (with meals)    GLUCOSE 4 G CHEWABLE TABLET    Take 4 tablets by mouth as needed for Low blood sugar    GLUCOSE MONITORING KIT (FREESTYLE) MONITORING KIT    1 each by Does not apply route once for 1 dose.    HYDRALAZINE (APRESOLINE) 25 MG TABLET    Take 1 tablet by mouth every 8 hours    HYDROCODONE-ACETAMINOPHEN (NORCO)  MG PER TABLET    Take 1 tablet by mouth every 6 hours as needed for Pain for up to 3 days. Intended supply: 30 days Max Daily Amount: 4 tablets    INSULIN GLARGINE (LANTUS SOLOSTAR) 100 UNIT/ML INJECTION PEN    Inject 10 Units into the skin nightly    LANCETS MISC    1 each by Does not apply route daily    PREGABALIN (LYRICA) 75 MG CAPSULE    Take 1 capsule by mouth daily for 30 days. Max Daily Amount: 75 mg    VITAMIN D (CHOLECALCIFEROL) 25 MCG (1000 UT) TABS TABLET    Take 5 tablets by mouth daily    WOUND CLEANSERS (VASHE WOUND THERAPY) EXTERNAL SOLUTION    During wound care       ALLERGIES     Adhesive tape, Doxycycline, and Reglan [metoclopramide]    FAMILYHISTORY       Family History   Problem Relation Age of Onset    Cancer Mother         ?site    Stroke Mother     Bleeding Prob Mother     Diabetes Father     Heart Disease Father     High Blood  Sclera anicteric.   ENT:  Ears, nose, mouth normal.     NECK:  Supple.  CARDIO:  RRR.  LUNGS:   CTAB.  Respirations unlabored.  EXTREMITIES:  Amputation site to the left foot with wound vac in place.  Chronic skin changes consistent with previous pictures in chart.  No erythema/warmth/streaking.    SKIN:  Warm and dry.  Abrasion to the left shin, no evidence secondary infection.  NEUROLOGICAL:  Alert and oriented.  PSYCHIATRIC:  Normal mood.     DIAGNOSTIC RESULTS   LABS:    Labs Reviewed   CBC WITH AUTO DIFFERENTIAL - Abnormal; Notable for the following components:       Result Value    RBC 3.10 (*)     Hemoglobin 9.1 (*)     Hematocrit 29.0 (*)     MCHC 31.4 (*)     RDW 17.4 (*)     Platelets 138 (*)     Neutrophils % 86.3 (*)     Lymphocytes % 6.9 (*)     Monocytes % 4.9 (*)     All other components within normal limits   COMPREHENSIVE METABOLIC PANEL - Abnormal; Notable for the following components:    Chloride 94 (*)     Glucose 200 (*)     BUN 48 (*)     Creatinine 7.1 (*)     Est, Glom Filt Rate 9 (*)     Calcium 7.9 (*)     ALT 57 (*)     AST 79 (*)     All other components within normal limits       When ordered, only abnormal lab results are displayed.  All other labs were within normal range or not returned as of this dictation.    EKG:  When ordered, EKG's are interpreted by the Emergency Department Physician in the absence of a cardiologist.  Please see their note for interpretation of EKG.    RADIOLOGY:   Non-plain film images such as CT, Ultrasound and MRI are read by the radiologist.  Plain radiographic images are visualized and preliminarily interpreted by the ED Provider.    Interpretation per the Radiologist below:    Vascular duplex lower extremity venous left   Final Result      1. No evidence of deep venous thrombosis.   2. Left inguinal lymphadenopathy      Electronically signed by Mark Metzger MD      XR FOOT LEFT (MIN 3 VIEWS)   Final Result      No fracture or osteomyelitis status post

## 2024-05-27 PROBLEM — L97.513 DIABETIC ULCER OF TOE OF RIGHT FOOT ASSOCIATED WITH TYPE 2 DIABETES MELLITUS, WITH NECROSIS OF MUSCLE (HCC): Status: ACTIVE | Noted: 2024-04-30

## 2024-05-28 ENCOUNTER — HOSPITAL ENCOUNTER (OUTPATIENT)
Dept: WOUND CARE | Age: 49
Discharge: HOME | End: 2024-05-28
Attending: STUDENT IN AN ORGANIZED HEALTH CARE EDUCATION/TRAINING PROGRAM
Payer: MEDICARE

## 2024-05-28 VITALS
HEART RATE: 88 BPM | DIASTOLIC BLOOD PRESSURE: 91 MMHG | RESPIRATION RATE: 22 BRPM | TEMPERATURE: 97.9 F | SYSTOLIC BLOOD PRESSURE: 160 MMHG

## 2024-05-28 DIAGNOSIS — E11.621 DIABETIC ULCER OF TOE OF RIGHT FOOT ASSOCIATED WITH TYPE 2 DIABETES MELLITUS, WITH NECROSIS OF MUSCLE (HCC): ICD-10-CM

## 2024-05-28 DIAGNOSIS — Z89.422 ABSENCE OF TOE OF LEFT FOOT (HCC): Primary | ICD-10-CM

## 2024-05-28 DIAGNOSIS — L97.513 DIABETIC ULCER OF TOE OF RIGHT FOOT ASSOCIATED WITH TYPE 2 DIABETES MELLITUS, WITH NECROSIS OF MUSCLE (HCC): ICD-10-CM

## 2024-05-28 PROCEDURE — 11042 DBRDMT SUBQ TIS 1ST 20SQCM/<: CPT

## 2024-05-28 PROCEDURE — 11046 DBRDMT MUSC&/FSCA EA ADDL: CPT

## 2024-05-28 PROCEDURE — 11043 DBRDMT MUSC&/FSCA 1ST 20/<: CPT

## 2024-05-28 PROCEDURE — 11045 DBRDMT SUBQ TISS EACH ADDL: CPT

## 2024-05-28 PROCEDURE — 6370000000 HC RX 637 (ALT 250 FOR IP): Performed by: SURGERY

## 2024-05-28 PROCEDURE — 97605 NEG PRS WND THER DME<=50SQCM: CPT

## 2024-05-28 RX ORDER — LIDOCAINE HYDROCHLORIDE 20 MG/ML
JELLY TOPICAL ONCE
OUTPATIENT
Start: 2024-05-28 | End: 2024-05-28

## 2024-05-28 RX ORDER — LIDOCAINE HYDROCHLORIDE 20 MG/ML
JELLY TOPICAL ONCE
Status: CANCELLED | OUTPATIENT
Start: 2024-05-28 | End: 2024-05-28

## 2024-05-28 RX ORDER — LIDOCAINE 40 MG/G
CREAM TOPICAL ONCE
Status: CANCELLED | OUTPATIENT
Start: 2024-05-28 | End: 2024-05-28

## 2024-05-28 RX ORDER — BACITRACIN ZINC AND POLYMYXIN B SULFATE 500; 1000 [USP'U]/G; [USP'U]/G
OINTMENT TOPICAL ONCE
Status: CANCELLED | OUTPATIENT
Start: 2024-05-28 | End: 2024-05-28

## 2024-05-28 RX ORDER — BACITRACIN ZINC AND POLYMYXIN B SULFATE 500; 1000 [USP'U]/G; [USP'U]/G
OINTMENT TOPICAL ONCE
OUTPATIENT
Start: 2024-05-28 | End: 2024-05-28

## 2024-05-28 RX ORDER — BETAMETHASONE DIPROPIONATE 0.5 MG/G
CREAM TOPICAL ONCE
Status: CANCELLED | OUTPATIENT
Start: 2024-05-28 | End: 2024-05-28

## 2024-05-28 RX ORDER — BETAMETHASONE DIPROPIONATE 0.5 MG/G
CREAM TOPICAL ONCE
OUTPATIENT
Start: 2024-05-28 | End: 2024-05-28

## 2024-05-28 RX ORDER — CLOBETASOL PROPIONATE 0.5 MG/G
OINTMENT TOPICAL ONCE
Status: CANCELLED | OUTPATIENT
Start: 2024-05-28 | End: 2024-05-28

## 2024-05-28 RX ORDER — LIDOCAINE HYDROCHLORIDE 40 MG/ML
SOLUTION TOPICAL ONCE
OUTPATIENT
Start: 2024-05-28 | End: 2024-05-28

## 2024-05-28 RX ORDER — CLOBETASOL PROPIONATE 0.5 MG/G
OINTMENT TOPICAL ONCE
OUTPATIENT
Start: 2024-05-28 | End: 2024-05-28

## 2024-05-28 RX ORDER — BACITRACIN ZINC 500 [USP'U]/G
OINTMENT TOPICAL ONCE
OUTPATIENT
Start: 2024-05-28 | End: 2024-05-28

## 2024-05-28 RX ORDER — IBUPROFEN 200 MG
TABLET ORAL ONCE
OUTPATIENT
Start: 2024-05-28 | End: 2024-05-28

## 2024-05-28 RX ORDER — OXYCODONE HYDROCHLORIDE AND ACETAMINOPHEN 5; 325 MG/1; MG/1
1 TABLET ORAL EVERY 4 HOURS PRN
Qty: 25 TABLET | Refills: 0 | Status: SHIPPED | OUTPATIENT
Start: 2024-05-28 | End: 2024-06-02

## 2024-05-28 RX ORDER — LIDOCAINE 50 MG/G
OINTMENT TOPICAL ONCE
OUTPATIENT
Start: 2024-05-28 | End: 2024-05-28

## 2024-05-28 RX ORDER — LIDOCAINE 50 MG/G
OINTMENT TOPICAL ONCE
Status: CANCELLED | OUTPATIENT
Start: 2024-05-28 | End: 2024-05-28

## 2024-05-28 RX ORDER — LIDOCAINE 40 MG/G
CREAM TOPICAL ONCE
OUTPATIENT
Start: 2024-05-28 | End: 2024-05-28

## 2024-05-28 RX ORDER — TRIAMCINOLONE ACETONIDE 1 MG/G
OINTMENT TOPICAL ONCE
OUTPATIENT
Start: 2024-05-28 | End: 2024-05-28

## 2024-05-28 RX ORDER — BACITRACIN ZINC 500 [USP'U]/G
OINTMENT TOPICAL ONCE
Status: CANCELLED | OUTPATIENT
Start: 2024-05-28 | End: 2024-05-28

## 2024-05-28 RX ORDER — IBUPROFEN 200 MG
TABLET ORAL ONCE
Status: CANCELLED | OUTPATIENT
Start: 2024-05-28 | End: 2024-05-28

## 2024-05-28 RX ORDER — SODIUM CHLOR/HYPOCHLOROUS ACID 0.033 %
SOLUTION, IRRIGATION IRRIGATION ONCE
Status: CANCELLED | OUTPATIENT
Start: 2024-05-28 | End: 2024-05-28

## 2024-05-28 RX ORDER — GENTAMICIN SULFATE 1 MG/G
OINTMENT TOPICAL ONCE
Status: CANCELLED | OUTPATIENT
Start: 2024-05-28 | End: 2024-05-28

## 2024-05-28 RX ORDER — GENTAMICIN SULFATE 1 MG/G
OINTMENT TOPICAL ONCE
OUTPATIENT
Start: 2024-05-28 | End: 2024-05-28

## 2024-05-28 RX ORDER — LIDOCAINE HYDROCHLORIDE 40 MG/ML
SOLUTION TOPICAL ONCE
Status: CANCELLED | OUTPATIENT
Start: 2024-05-28 | End: 2024-05-28

## 2024-05-28 RX ORDER — TRIAMCINOLONE ACETONIDE 1 MG/G
OINTMENT TOPICAL ONCE
Status: CANCELLED | OUTPATIENT
Start: 2024-05-28 | End: 2024-05-28

## 2024-05-28 RX ORDER — SODIUM CHLOR/HYPOCHLOROUS ACID 0.033 %
SOLUTION, IRRIGATION IRRIGATION ONCE
OUTPATIENT
Start: 2024-05-28 | End: 2024-05-28

## 2024-05-28 RX ADMIN — COLLAGENASE SANTYL: 250 OINTMENT TOPICAL at 11:59

## 2024-05-28 ASSESSMENT — PAIN - FUNCTIONAL ASSESSMENT: PAIN_FUNCTIONAL_ASSESSMENT: PREVENTS OR INTERFERES SOME ACTIVE ACTIVITIES AND ADLS

## 2024-05-28 ASSESSMENT — PAIN SCALES - WONG BAKER: WONGBAKER_NUMERICALRESPONSE: NO HURT

## 2024-05-28 ASSESSMENT — PAIN SCALES - GENERAL: PAINLEVEL_OUTOF10: 8

## 2024-05-28 ASSESSMENT — PAIN DESCRIPTION - PAIN TYPE: TYPE: SURGICAL PAIN

## 2024-05-28 ASSESSMENT — PAIN DESCRIPTION - LOCATION: LOCATION: FOOT

## 2024-05-28 ASSESSMENT — PAIN DESCRIPTION - DESCRIPTORS: DESCRIPTORS: BURNING;SHOOTING

## 2024-05-28 ASSESSMENT — PAIN DESCRIPTION - FREQUENCY: FREQUENCY: CONTINUOUS

## 2024-05-28 ASSESSMENT — PAIN DESCRIPTION - ORIENTATION: ORIENTATION: LEFT

## 2024-05-28 ASSESSMENT — PAIN DESCRIPTION - ONSET: ONSET: ON-GOING

## 2024-05-28 NOTE — PROGRESS NOTES
Multilayer Compression Wrap   (Not Unna) Below the Knee    NAME:  Yovanny Levine  YOB: 1975  MEDICAL RECORD NUMBER:  [id number]  DATE:  5/28/2024    Multilayer compression wrap: Removed old Multilayer wrap if indicated and wash leg with mild soap/water.  Applied moisturizing agent to dry skin as needed.   Applied primary and secondary dressing as ordered.  Applied multilayered dressing below the knee to left lower leg.  Instructed patient/caregiver not to remove dressing and to keep it clean and dry.   Instructed patient/caregiver on complications to report to provider, such as pain, numbness in toes, heavy drainage, and slippage of dressing.  Instructed patient on purpose of compression dressing and on activity and exercise recommendations.      Electronically signed by Ankit Dan RN on 5/28/2024 at 12:03 PMNegative Pressure    NAME:  Yovanny Levine  YOB: 1975  MEDICAL RECORD NUMBER:  [id number]  DATE:  5/28/2024    Applied Negative Pressure to LEFT FOOT wound(s)/ulcer(s).  [x] Applied skin barrier prep to mao-wound.   [x] Cut strips of plastic drape to picture frame wound so that mao-wound is     covered with the drape.   [x] If bridging dressing to less prominent site, cover any intact skin that will come in contact with the Negative Pressure Therapy sponge, gauze or channel drain with plastic drape.  The sponge should never touch intact skin.   [x] Cut sponge, gauze or channel drain to size which will fit into the wound/ulcer bed without being forced.   [x] Be sure the sponge is large enough to hold the entire round plastic flange which is attached to the tubing.  Never allow flange to be larger than the sponge or it will produce suction damaging intact skin.  Total number of individual pieces of foam used within the wound bed: 1    [x] If bridging the dressing away from the primary site, be sure the bridge leads to a piece of sponge large enough to hold the entire flange

## 2024-05-28 NOTE — PATIENT INSTRUCTIONS
PHYSICIAN ORDERS AND DISCHARGE INSTRUCTIONS     Wound cleansing:              Do not scrub or use excessive force.             Wash hands with soap and water before and after dressing changes.             Prior to applying a clean dressing, cleanse wound with normal saline,               wound cleanser, or mild soap and water.              Ask the physician or nurse before getting the wound(s) wet in a shower                         Grafts:             Wound Care Notes:  Rx:    Cultures:    Sees Dr Kumar.                                         Orders for this week: 2024     Fax to [facility]! Reapply wound vac if it becomes disconnected ASAP even if macerated    Left Lateral Proximal plantar (smaller) wound - wash with soap and water, pat dry.  Apply Sorbact to the wound bed, followed by Santyl and Stimulen.  Double Qwick to periwound to offload.  Cover with ABD or super absorber and secure with vac drape.     Left Lateral Distal plantar (larger) wound: WOUND VAC THERAPY:  SKIN PREP OR MASTISOL, THEN DUODERM TO PERIWOUND FOR PROTECTION.   APPLY SORBACT TO WOUND BED, SANTYL, STIMULEN, and BLACK FOAM TO WOUND. SECURE VAC DRESSING WITH DRAPE.  SET WOUND VAC  CONTINUOUS SUCTION.   CANISTER CHANGE WEEKLY OR ACCORDING TO VOLUME OF DRAINAGE.  Wrap with Coflex Full, but do not compress vac tubing against skin.  WOUND VAC DRESSING TO BE CHANGED BY HOME CARE ON  AND SATURDAY (OR ).  WOUND CARE CENTER WILL CHANGE ON  (NEED TO BRING VAC SUPPLIES TO APPOINTMENTS - CANISTER AND BLACK FOAM SUCTION KIT).        Follow Up Instructions: At the Wound Care Center in 1 week  Primary Wound Care Provider: Dr Forrest  Call  for any questions or concerns.  Central [phone] for imaging lab work

## 2024-05-30 ENCOUNTER — TELEPHONE (OUTPATIENT)
Dept: WOUND CARE | Age: 49
End: 2024-05-30

## 2024-05-30 NOTE — TELEPHONE ENCOUNTER
Sangita at [facility] pharmacy called to clarify a recent percocet script from Dr. Forrest for 25 tabs. She said that the patient had received Norco and Percocet (60 tabs) about a week ago and that Dr. Izquierdo is working on weaning the dose down. Dr. Forrest was notified and he instructed to cancel his recent prescription. Pharmacy and patient notified of decision.    Electronically signed by Fernando Cooper RN on 5/30/2024 at 3:43 PM

## 2024-05-31 PROBLEM — L97.405 DIABETIC ULCER OF MIDFOOT ASSOCIATED WITH TYPE 2 DIABETES MELLITUS, WITH MUSCLE INVOLVEMENT WITHOUT EVIDENCE OF NECROSIS (HCC): Status: ACTIVE | Noted: 2024-05-31

## 2024-05-31 PROBLEM — E11.621 DIABETIC ULCER OF MIDFOOT ASSOCIATED WITH TYPE 2 DIABETES MELLITUS, WITH MUSCLE INVOLVEMENT WITHOUT EVIDENCE OF NECROSIS (HCC): Status: ACTIVE | Noted: 2024-05-31

## 2024-06-02 ENCOUNTER — APPOINTMENT (OUTPATIENT)
Dept: GENERAL RADIOLOGY | Age: 49
End: 2024-06-02
Payer: MEDICARE

## 2024-06-02 ENCOUNTER — APPOINTMENT (OUTPATIENT)
Dept: ULTRASOUND IMAGING | Age: 49
End: 2024-06-02
Payer: MEDICARE

## 2024-06-02 ENCOUNTER — HOSPITAL ENCOUNTER (EMERGENCY)
Age: 49
Discharge: HOME | End: 2024-06-02
Attending: STUDENT IN AN ORGANIZED HEALTH CARE EDUCATION/TRAINING PROGRAM
Payer: MEDICARE

## 2024-06-02 VITALS
DIASTOLIC BLOOD PRESSURE: 97 MMHG | RESPIRATION RATE: 15 BRPM | OXYGEN SATURATION: 100 % | SYSTOLIC BLOOD PRESSURE: 166 MMHG | TEMPERATURE: 98.2 F | HEART RATE: 85 BPM

## 2024-06-02 DIAGNOSIS — E11.621 DIABETIC ULCER OF TOE OF RIGHT FOOT ASSOCIATED WITH TYPE 2 DIABETES MELLITUS, WITH NECROSIS OF MUSCLE (HCC): ICD-10-CM

## 2024-06-02 DIAGNOSIS — M79.605 LEFT LEG PAIN: Primary | ICD-10-CM

## 2024-06-02 DIAGNOSIS — L97.513 DIABETIC ULCER OF TOE OF RIGHT FOOT ASSOCIATED WITH TYPE 2 DIABETES MELLITUS, WITH NECROSIS OF MUSCLE (HCC): ICD-10-CM

## 2024-06-02 LAB
ALBUMIN SERPL-MCNC: 3.6 GM/DL (ref 3.4–5)
ALP BLD-CCNC: 70 IU/L (ref 40–128)
ALT SERPL-CCNC: 8 U/L (ref 10–40)
ANION GAP SERPL CALCULATED.3IONS-SCNC: 17 MMOL/L (ref 7–16)
AST SERPL-CCNC: 11 IU/L (ref 15–37)
BASOPHILS ABSOLUTE: 0 K/CU MM
BASOPHILS RELATIVE PERCENT: 0.6 % (ref 0–1)
BILIRUB SERPL-MCNC: 0.4 MG/DL (ref 0–1)
BUN SERPL-MCNC: 68 MG/DL (ref 6–23)
CALCIUM SERPL-MCNC: 7.8 MG/DL (ref 8.3–10.6)
CHLORIDE BLD-SCNC: 97 MMOL/L (ref 99–110)
CO2: 24 MMOL/L (ref 21–32)
CREAT SERPL-MCNC: 7.1 MG/DL (ref 0.9–1.3)
DIFFERENTIAL TYPE: ABNORMAL
EOSINOPHILS ABSOLUTE: 0.1 K/CU MM
EOSINOPHILS RELATIVE PERCENT: 2.4 % (ref 0–3)
GFR, ESTIMATED: 9 ML/MIN/1.73M2
GLUCOSE SERPL-MCNC: 154 MG/DL (ref 70–99)
HCT VFR BLD CALC: 31.9 % (ref 42–52)
HEMOGLOBIN: 9.7 GM/DL (ref 13.5–18)
IMMATURE NEUTROPHIL %: 0.6 % (ref 0–0.43)
LYMPHOCYTES ABSOLUTE: 0.5 K/CU MM
LYMPHOCYTES RELATIVE PERCENT: 9.9 % (ref 24–44)
MAGNESIUM: 2.1 MG/DL (ref 1.8–2.4)
MCH RBC QN AUTO: 28.8 PG (ref 27–31)
MCHC RBC AUTO-ENTMCNC: 30.4 % (ref 32–36)
MCV RBC AUTO: 94.7 FL (ref 78–100)
MONOCYTES ABSOLUTE: 0.4 K/CU MM
MONOCYTES RELATIVE PERCENT: 6.5 % (ref 0–4)
NEUTROPHILS ABSOLUTE: 4.3 K/CU MM
NEUTROPHILS RELATIVE PERCENT: 80 % (ref 36–66)
NUCLEATED RBC %: 0 %
PDW BLD-RTO: 16.7 % (ref 11.7–14.9)
PLATELET # BLD: 186 K/CU MM (ref 140–440)
PMV BLD AUTO: 9.8 FL (ref 7.5–11.1)
POTASSIUM SERPL-SCNC: 4.6 MMOL/L (ref 3.5–5.1)
PRO-BNP: ABNORMAL PG/ML
RBC # BLD: 3.37 M/CU MM (ref 4.6–6.2)
SODIUM BLD-SCNC: 138 MMOL/L (ref 135–145)
TOTAL IMMATURE NEUTOROPHIL: 0.03 K/CU MM
TOTAL NUCLEATED RBC: 0 K/CU MM
TOTAL PROTEIN: 7.2 GM/DL (ref 6.4–8.2)
TROPONIN, HIGH SENSITIVITY: 158 NG/L (ref 0–22)
TROPONIN, HIGH SENSITIVITY: 159 NG/L (ref 0–22)
WBC # BLD: 5.4 K/CU MM (ref 4–10.5)

## 2024-06-02 PROCEDURE — 96374 THER/PROPH/DIAG INJ IV PUSH: CPT

## 2024-06-02 PROCEDURE — 6370000000 HC RX 637 (ALT 250 FOR IP): Performed by: STUDENT IN AN ORGANIZED HEALTH CARE EDUCATION/TRAINING PROGRAM

## 2024-06-02 PROCEDURE — 85025 COMPLETE CBC W/AUTO DIFF WBC: CPT

## 2024-06-02 PROCEDURE — 96375 TX/PRO/DX INJ NEW DRUG ADDON: CPT

## 2024-06-02 PROCEDURE — 6360000002 HC RX W HCPCS: Performed by: STUDENT IN AN ORGANIZED HEALTH CARE EDUCATION/TRAINING PROGRAM

## 2024-06-02 PROCEDURE — 73630 X-RAY EXAM OF FOOT: CPT

## 2024-06-02 PROCEDURE — 84484 ASSAY OF TROPONIN QUANT: CPT

## 2024-06-02 PROCEDURE — 83735 ASSAY OF MAGNESIUM: CPT

## 2024-06-02 PROCEDURE — 93005 ELECTROCARDIOGRAM TRACING: CPT | Performed by: PHYSICIAN ASSISTANT

## 2024-06-02 PROCEDURE — 83880 ASSAY OF NATRIURETIC PEPTIDE: CPT

## 2024-06-02 PROCEDURE — 71045 X-RAY EXAM CHEST 1 VIEW: CPT

## 2024-06-02 PROCEDURE — 80053 COMPREHEN METABOLIC PANEL: CPT

## 2024-06-02 PROCEDURE — 99285 EMERGENCY DEPT VISIT HI MDM: CPT

## 2024-06-02 PROCEDURE — 93971 EXTREMITY STUDY: CPT

## 2024-06-02 RX ORDER — OXYCODONE HYDROCHLORIDE AND ACETAMINOPHEN 5; 325 MG/1; MG/1
1 TABLET ORAL EVERY 8 HOURS PRN
Qty: 6 TABLET | Refills: 0 | Status: SHIPPED | OUTPATIENT
Start: 2024-06-02 | End: 2024-06-04

## 2024-06-02 RX ORDER — HYDROCODONE BITARTRATE AND ACETAMINOPHEN 7.5; 325 MG/1; MG/1
1 TABLET ORAL ONCE
Status: COMPLETED | OUTPATIENT
Start: 2024-06-02 | End: 2024-06-02

## 2024-06-02 RX ORDER — KETOROLAC TROMETHAMINE 15 MG/ML
15 INJECTION, SOLUTION INTRAMUSCULAR; INTRAVENOUS ONCE
Status: COMPLETED | OUTPATIENT
Start: 2024-06-02 | End: 2024-06-02

## 2024-06-02 RX ADMIN — HYDROCODONE BITARTRATE AND ACETAMINOPHEN 1 TABLET: 7.5; 325 TABLET ORAL at 18:20

## 2024-06-02 RX ADMIN — HYDROMORPHONE HYDROCHLORIDE 0.5 MG: 1 INJECTION, SOLUTION INTRAMUSCULAR; INTRAVENOUS; SUBCUTANEOUS at 20:35

## 2024-06-02 RX ADMIN — KETOROLAC TROMETHAMINE 15 MG: 15 INJECTION, SOLUTION INTRAMUSCULAR; INTRAVENOUS at 18:21

## 2024-06-02 ASSESSMENT — PAIN DESCRIPTION - ORIENTATION
ORIENTATION: LEFT

## 2024-06-02 ASSESSMENT — PAIN DESCRIPTION - LOCATION
LOCATION: LEG

## 2024-06-02 ASSESSMENT — PAIN DESCRIPTION - DESCRIPTORS
DESCRIPTORS: ACHING

## 2024-06-02 ASSESSMENT — PAIN SCALES - GENERAL
PAINLEVEL_OUTOF10: 6
PAINLEVEL_OUTOF10: 8
PAINLEVEL_OUTOF10: 8
PAINLEVEL_OUTOF10: 9
PAINLEVEL_OUTOF10: 7

## 2024-06-02 NOTE — ED TRIAGE NOTES
Patient to triage with c/o pain to left foot. Patient currently has wound vac. In place. Patient also states he thinks he may be in fluid overload because he is having shortness of breath that started last night. Resps even and unlabored.

## 2024-06-02 NOTE — ED PROVIDER NOTES
Age of Onset    Cancer Mother         ?site    Stroke Mother     Bleeding Prob Mother     Diabetes Father     Heart Disease Father     High Blood Pressure Father     Obesity Father     Kidney Disease Father     Diabetes Sister     High Blood Pressure Sister     Mental Illness Sister     Obesity Sister     High Blood Pressure Other     Mental Illness Other         bipolar    Other Son         cyst in ear canal    ADHD Daughter      Social History     Socioeconomic History    Marital status:      Spouse name: Not on file    Number of children: Not on file    Years of education: Not on file    Highest education level: Not on file   Occupational History    Not on file   Tobacco Use    Smoking status: Former     Current packs/day: 0.00     Types: Cigarettes     Start date: 2002     Quit date: 2022     Years since quittin.9    Smokeless tobacco: Never    Tobacco comments:     Smokes when drinking/social   Vaping Use    Vaping Use: Never used   Substance and Sexual Activity    Alcohol use: Not Currently     Comment: occ    Drug use: Yes     Frequency: 7.0 times per week     Types: Marijuana (Weed)     Comment: 21 @     Sexual activity: Yes     Partners: Female   Other Topics Concern    Not on file   Social History Narrative    Not on file     Social Determinants of Health     Financial Resource Strain: Not on file   Food Insecurity: No Food Insecurity (2024)    Hunger Vital Sign     Worried About Running Out of Food in the Last Year: Never true     Ran Out of Food in the Last Year: Never true   Transportation Needs: No Transportation Needs (2024)    PRAPARE - Transportation     Lack of Transportation (Medical): No     Lack of Transportation (Non-Medical): No   Physical Activity: Not on file   Stress: Not on file   Social Connections: Not on file   Intimate Partner Violence: Not on file   Housing Stability: Low Risk  (2024)    Housing Stability Vital Sign     Unable to Pay for

## 2024-06-03 ENCOUNTER — TELEPHONE (OUTPATIENT)
Dept: BARIATRICS/WEIGHT MGMT | Age: 49
End: 2024-06-03

## 2024-06-03 NOTE — DISCHARGE INSTRUCTIONS
-Take Tylenol (1000 mg, every 6-8 hours) and/or ibuprofen (600 mg, every 6-8 hours) as needed for pain  -Take Percocets as needed for severe pain  -Transfer to [facility] pharmacy so they can send your medication at home, I already put the pain medication going there  -Follow-up with your surgeon, Dr. Forrest as soon as you can  -Come back to emergency department if you develop severe pain, severe redness, fever, vomiting, or if you are concerned

## 2024-06-03 NOTE — TELEPHONE ENCOUNTER
Pt called stating that his wound vac - alarm has been going off since 6 am for a leak - pt called wound care - he has no transport to take him, office tried to get pt to come into the office today - no transport    Called [facility]* to get nurse to come to his house today . Waiting on a call back.     Pt turned off the wound vac at this time

## 2024-06-03 NOTE — CARE COORDINATION
Dr. Smith requested that CM see patient. Patient unable to  his needed prescriptions as he has limited transportation. CM in to see patient. Patient states that he uses [facility] pharmacy on [address], but is willing to change pharmacies to a pharmacy that will deliver his medication b/c he knows that [facility] will not deliver his meds.     Patient also reported that he is getting ready to get his leg amputated in the coming weeks and wanted to know about OT/PT after surgery. CM discussed Home Care and SNF Placement. Patient would rather get therapy in SNF setting. CM provided patient a listing of facilities that his [facility] will cover as well as area listing of SNFs.       CM gave patient and Dr. [facility] information with phone number/address/name. Dr. Smith will call patients' prescription in to [facility]'s pharmacy so patient can have his medication delivered. CM gave patient same information and patient will call [facility] and have his regular prescriptions transferred over to [facility] Pharmacy. Patient also to call [facility]'s to give them his name/address/insurance information. Patient in agreement and happy with prescription situation getting resolved.

## 2024-06-04 ENCOUNTER — HOSPITAL ENCOUNTER (OUTPATIENT)
Dept: WOUND CARE | Age: 49
Discharge: HOME | End: 2024-06-04
Attending: STUDENT IN AN ORGANIZED HEALTH CARE EDUCATION/TRAINING PROGRAM
Payer: MEDICARE

## 2024-06-04 VITALS
SYSTOLIC BLOOD PRESSURE: 148 MMHG | TEMPERATURE: 98.2 F | RESPIRATION RATE: 18 BRPM | HEART RATE: 94 BPM | DIASTOLIC BLOOD PRESSURE: 84 MMHG

## 2024-06-04 DIAGNOSIS — Z99.2 ESRD (END STAGE RENAL DISEASE) ON DIALYSIS (HCC): ICD-10-CM

## 2024-06-04 DIAGNOSIS — L97.405 DIABETIC ULCER OF MIDFOOT ASSOCIATED WITH TYPE 2 DIABETES MELLITUS, WITH MUSCLE INVOLVEMENT WITHOUT EVIDENCE OF NECROSIS, UNSPECIFIED LATERALITY (HCC): ICD-10-CM

## 2024-06-04 DIAGNOSIS — Z89.422 ABSENCE OF TOE OF LEFT FOOT (HCC): Primary | ICD-10-CM

## 2024-06-04 DIAGNOSIS — N18.6 ESRD (END STAGE RENAL DISEASE) ON DIALYSIS (HCC): ICD-10-CM

## 2024-06-04 DIAGNOSIS — Z78.9 PRESENCE OF SURGICAL INCISION: ICD-10-CM

## 2024-06-04 DIAGNOSIS — E11.621 DIABETIC ULCER OF MIDFOOT ASSOCIATED WITH TYPE 2 DIABETES MELLITUS, WITH MUSCLE INVOLVEMENT WITHOUT EVIDENCE OF NECROSIS, UNSPECIFIED LATERALITY (HCC): ICD-10-CM

## 2024-06-04 DIAGNOSIS — T14.8XXA PAIN ASSOCIATED WITH WOUND: ICD-10-CM

## 2024-06-04 DIAGNOSIS — R52 PAIN ASSOCIATED WITH WOUND: ICD-10-CM

## 2024-06-04 DIAGNOSIS — L97.513 DIABETIC ULCER OF TOE OF RIGHT FOOT ASSOCIATED WITH TYPE 2 DIABETES MELLITUS, WITH NECROSIS OF MUSCLE (HCC): ICD-10-CM

## 2024-06-04 DIAGNOSIS — E11.621 DIABETIC ULCER OF TOE OF RIGHT FOOT ASSOCIATED WITH TYPE 2 DIABETES MELLITUS, WITH NECROSIS OF MUSCLE (HCC): ICD-10-CM

## 2024-06-04 LAB
EKG ATRIAL RATE: 98 BPM
EKG DIAGNOSIS: NORMAL
EKG P AXIS: 27 DEGREES
EKG P-R INTERVAL: 176 MS
EKG Q-T INTERVAL: 372 MS
EKG QRS DURATION: 84 MS
EKG QTC CALCULATION (BAZETT): 474 MS
EKG R AXIS: 34 DEGREES
EKG T AXIS: 76 DEGREES
EKG VENTRICULAR RATE: 98 BPM

## 2024-06-04 PROCEDURE — 11043 DBRDMT MUSC&/FSCA 1ST 20/<: CPT

## 2024-06-04 PROCEDURE — 93010 ELECTROCARDIOGRAM REPORT: CPT | Performed by: INTERNAL MEDICINE

## 2024-06-04 PROCEDURE — 11043 DBRDMT MUSC&/FSCA 1ST 20/<: CPT | Performed by: NURSE PRACTITIONER

## 2024-06-04 PROCEDURE — 11046 DBRDMT MUSC&/FSCA EA ADDL: CPT | Performed by: NURSE PRACTITIONER

## 2024-06-04 PROCEDURE — 11042 DBRDMT SUBQ TIS 1ST 20SQCM/<: CPT

## 2024-06-04 PROCEDURE — 11042 DBRDMT SUBQ TIS 1ST 20SQCM/<: CPT | Performed by: NURSE PRACTITIONER

## 2024-06-04 PROCEDURE — 11045 DBRDMT SUBQ TISS EACH ADDL: CPT

## 2024-06-04 PROCEDURE — 97605 NEG PRS WND THER DME<=50SQCM: CPT

## 2024-06-04 RX ORDER — LIDOCAINE HYDROCHLORIDE 20 MG/ML
JELLY TOPICAL ONCE
OUTPATIENT
Start: 2024-06-04 | End: 2024-06-04

## 2024-06-04 RX ORDER — LIDOCAINE 40 MG/G
CREAM TOPICAL ONCE
OUTPATIENT
Start: 2024-06-04 | End: 2024-06-04

## 2024-06-04 RX ORDER — SODIUM CHLOR/HYPOCHLOROUS ACID 0.033 %
SOLUTION, IRRIGATION IRRIGATION ONCE
OUTPATIENT
Start: 2024-06-04 | End: 2024-06-04

## 2024-06-04 RX ORDER — LIDOCAINE HYDROCHLORIDE 40 MG/ML
SOLUTION TOPICAL ONCE
OUTPATIENT
Start: 2024-06-04 | End: 2024-06-04

## 2024-06-04 RX ORDER — IBUPROFEN 200 MG
TABLET ORAL ONCE
OUTPATIENT
Start: 2024-06-04 | End: 2024-06-04

## 2024-06-04 RX ORDER — CLOBETASOL PROPIONATE 0.5 MG/G
OINTMENT TOPICAL ONCE
OUTPATIENT
Start: 2024-06-04 | End: 2024-06-04

## 2024-06-04 RX ORDER — OXYCODONE AND ACETAMINOPHEN 7.5; 325 MG/1; MG/1
1 TABLET ORAL EVERY 8 HOURS PRN
Qty: 21 TABLET | Refills: 0 | Status: ON HOLD | OUTPATIENT
Start: 2024-06-04 | End: 2024-06-11

## 2024-06-04 RX ORDER — BACITRACIN ZINC 500 [USP'U]/G
OINTMENT TOPICAL ONCE
OUTPATIENT
Start: 2024-06-04 | End: 2024-06-04

## 2024-06-04 RX ORDER — BACITRACIN ZINC AND POLYMYXIN B SULFATE 500; 1000 [USP'U]/G; [USP'U]/G
OINTMENT TOPICAL ONCE
OUTPATIENT
Start: 2024-06-04 | End: 2024-06-04

## 2024-06-04 RX ORDER — TRIAMCINOLONE ACETONIDE 1 MG/G
OINTMENT TOPICAL ONCE
OUTPATIENT
Start: 2024-06-04 | End: 2024-06-04

## 2024-06-04 RX ORDER — LIDOCAINE 50 MG/G
OINTMENT TOPICAL ONCE
OUTPATIENT
Start: 2024-06-04 | End: 2024-06-04

## 2024-06-04 RX ORDER — METRONIDAZOLE 500 MG/1
500 TABLET ORAL 3 TIMES DAILY
Qty: 90 TABLET | Refills: 0 | Status: ON HOLD | OUTPATIENT
Start: 2024-06-04 | End: 2024-07-04

## 2024-06-04 RX ORDER — GENTAMICIN SULFATE 1 MG/G
OINTMENT TOPICAL ONCE
OUTPATIENT
Start: 2024-06-04 | End: 2024-06-04

## 2024-06-04 RX ORDER — BETAMETHASONE DIPROPIONATE 0.5 MG/G
CREAM TOPICAL ONCE
OUTPATIENT
Start: 2024-06-04 | End: 2024-06-04

## 2024-06-04 NOTE — PATIENT INSTRUCTIONS
PHYSICIAN ORDERS AND DISCHARGE INSTRUCTIONS     Wound cleansing:              Do not scrub or use excessive force.             Wash hands with soap and water before and after dressing changes.             Prior to applying a clean dressing, cleanse wound with normal saline,               wound cleanser, or mild soap and water.              Ask the physician or nurse before getting the wound(s) wet in a shower                      Wound Care Notes:  Sees Dr Kumar.                                         Orders for this week: 2024     Fax to [facility]! Reapply wound vac if it becomes disconnected ASAP even if macerated    Left Lateral Proximal plantar (smaller) wound - wash with soap and water, pat dry.  Apply Stimulen powder to the wound bed  Double Qwick to periwound to offload.  Cover with ABD and secure with vac drape.     Left Lateral Distal plantar (larger) wound: WOUND VAC THERAPY:  SKIN PREP OR MASTISOL, THEN DUODERM TO PERIWOUND FOR PROTECTION.   Bolster proximal wound together with duoderm   APPLY Stimulen powder and anasept spray  on BLACK FOAM TO WOUND. SECURE VAC DRESSING WITH DRAPE.  SET WOUND VAC  CONTINUOUS SUCTION.   CANISTER CHANGE WEEKLY OR ACCORDING TO VOLUME OF DRAINAGE.  Wrap with Coflex Full, but do not compress vac tubing against skin.  WOUND VAC DRESSING TO BE CHANGED BY HOME CARE ON  AND SATURDAY (OR ).  WOUND CARE CENTER WILL CHANGE ON  (NEED TO BRING VAC SUPPLIES TO APPOINTMENTS - CANISTER AND BLACK FOAM SUCTION KIT).       Plan: To apply x2 crushed flagyl to wound bed when received        Follow Up Instructions: At the Wound Care Center in 1 week  Primary Wound Care Provider: Dr Forrest  Call  for any questions or concerns.  Central [phone] for imaging lab work

## 2024-06-04 NOTE — PROGRESS NOTES
Negative Pressure    NAME:  Yovanny Levine  YOB: 1975  MEDICAL RECORD NUMBER:  [id number]  DATE:  6/4/2024    Applied Negative Pressure to LEFT FOOT wound(s)/ulcer(s).  [x] Applied skin barrier prep to mao-wound.   [x] Cut strips of plastic drape to picture frame wound so that mao-wound is     covered with the drape.   [x] If bridging dressing to less prominent site, cover any intact skin that will come in contact with the Negative Pressure Therapy sponge, gauze or channel drain with plastic drape.  The sponge should never touch intact skin.   [x] Cut sponge, gauze or channel drain to size which will fit into the wound/ulcer bed without being forced.   [x] Be sure the sponge is large enough to hold the entire round plastic flange which is attached to the tubing.  Never allow flange to be larger than the sponge or it will produce suction damaging intact skin.  Total number of individual pieces of foam used within the wound bed: 2    [x] If bridging the dressing away from the primary site, be sure the bridge leads to a piece of sponge large enough to hold the entire flange without allowing any of the flange to overlap onto intact skin.   [x] Covered sponge, gauze or channel drain with plastic drape.   [x] Cut a hole in this plastic drape directly over the sponge the same size as the plastic drain tubing.   [x] Removed plastic liner from flange and apply it directly over the hole you cut.   [x] Removed the plastic cover from the flange.   [x] Attached the tubing to the wound/ulcer Negative Pressure Therapy and turn it on to be sure a vacuum is created and that there are no leaks.   [x] If air leaks occur, use plastic drape to patch them.   [x] Secured Negative Pressure Therapy dressing with ace wrap loosely if located on an extremity. Maintain tubing outside of ace wrap. Tubing must not exert pressure on intact skin.    Applied per  Guidelines      Electronically signed by Ankit Dan RN 
Factors: diabetes, chronic pressure, shear force, obesity, and non-adherence  Associated Signs/Symptoms: edema, erythema, drainage, odor, and pain    BAIRON: as indicated base on wound location and assessment    Wound infection: wound culture will be obtained as needed for symptoms of infection     Culture   Date Value Ref Range Status   04/23/2024 (A)  Final    PORPHYROMONAS ASACCHAROLYTICA/UENONIS Moderate growth Beta Lactamase POSITIVE. Sensitivities not routinely done. Drugs of choice are: Metronidazole, Cefoxitin, or Piperacillin/Tazobactam.   04/23/2024 Final Report  Final   04/23/2024 ENTEROCOCCUS FAECIUM Light growth (A)  Final   04/23/2024 (A)  Final    CORYNEBACTERIUM STRIATUM Light growth No further workup        Arterial evaluation: if indicated based on wound, location, symptoms and healing    Venous Evaluation: if indicated based on wound, location, symptoms and healing    Radiology:    XR CHEST PORTABLE  Narrative: CHEST 1 VIEW      HISTORY: Shortness of breath.      COMPARISON: Chest x-ray dated 4/25/2024    FINDINGS: Portable AP radiograph of the chest was obtained. The heart and mediastinum are within normal limits for size and configuration. No infiltrate, effusion or pneumothorax is identified. Lung volumes are decreased.  Impression: 1.  No evidence of acute cardiopulmonary disease.    Electronically signed by Lionel Costello MD  Vascular duplex lower extremity venous left  Narrative: Indication: Left leg pain.    FINDINGS: Ultrasound imaging of the left lower extremity was obtained from the inguinal crease through the proximal calf. Study is somewhat limited secondary to patient being unable to tolerate compression as well as patient motion. There is no evidence   of any deep venous thrombosis within the left common femoral vein, left superficial femoral vein, left popliteal vein, left posterior tibial vein, left peroneal vein, and left anterior tibial vein. No thrombosis is identified within

## 2024-06-07 ENCOUNTER — HOSPITAL ENCOUNTER (INPATIENT)
Age: 49
LOS: 10 days | Discharge: NURSING, REHAB OR LONG-TERM CARE | DRG: 291 | End: 2024-06-17
Attending: EMERGENCY MEDICINE | Admitting: INTERNAL MEDICINE
Payer: MEDICARE

## 2024-06-07 ENCOUNTER — APPOINTMENT (OUTPATIENT)
Dept: GENERAL RADIOLOGY | Age: 49
DRG: 291 | End: 2024-06-07
Payer: MEDICARE

## 2024-06-07 DIAGNOSIS — J18.9 PNEUMONIA OF LEFT LOWER LOBE DUE TO INFECTIOUS ORGANISM: ICD-10-CM

## 2024-06-07 DIAGNOSIS — I47.29 VENTRICULAR TACHYCARDIA (PAROXYSMAL) (HCC): Primary | ICD-10-CM

## 2024-06-07 DIAGNOSIS — I42.9 CARDIOMYOPATHY, UNSPECIFIED TYPE (HCC): ICD-10-CM

## 2024-06-07 DIAGNOSIS — Z99.2 CKD (CHRONIC KIDNEY DISEASE) STAGE V REQUIRING CHRONIC DIALYSIS (HCC): ICD-10-CM

## 2024-06-07 DIAGNOSIS — I50.9 ACUTE CONGESTIVE HEART FAILURE, UNSPECIFIED HEART FAILURE TYPE (HCC): ICD-10-CM

## 2024-06-07 DIAGNOSIS — T14.8XXA PAIN ASSOCIATED WITH WOUND: ICD-10-CM

## 2024-06-07 DIAGNOSIS — R52 PAIN ASSOCIATED WITH WOUND: ICD-10-CM

## 2024-06-07 DIAGNOSIS — R07.9 CHEST PAIN, UNSPECIFIED TYPE: ICD-10-CM

## 2024-06-07 DIAGNOSIS — R06.00 DYSPNEA, UNSPECIFIED TYPE: ICD-10-CM

## 2024-06-07 DIAGNOSIS — J96.00 ACUTE RESPIRATORY FAILURE, UNSPECIFIED WHETHER WITH HYPOXIA OR HYPERCAPNIA (HCC): ICD-10-CM

## 2024-06-07 DIAGNOSIS — M86.9 OSTEOMYELITIS OF LEFT FOOT, UNSPECIFIED TYPE (HCC): ICD-10-CM

## 2024-06-07 DIAGNOSIS — N18.6 CKD (CHRONIC KIDNEY DISEASE) STAGE V REQUIRING CHRONIC DIALYSIS (HCC): ICD-10-CM

## 2024-06-07 LAB
ALBUMIN SERPL-MCNC: 3.8 GM/DL (ref 3.4–5)
ALP BLD-CCNC: 88 IU/L (ref 40–129)
ALT SERPL-CCNC: 16 U/L (ref 10–40)
ANION GAP SERPL CALCULATED.3IONS-SCNC: 13 MMOL/L (ref 7–16)
AST SERPL-CCNC: 21 IU/L (ref 15–37)
BASE EXCESS MIXED: 9.7 (ref 0–3)
BASOPHILS ABSOLUTE: 0 K/CU MM
BASOPHILS RELATIVE PERCENT: 0.5 % (ref 0–1)
BILIRUB SERPL-MCNC: 0.5 MG/DL (ref 0–1)
BUN SERPL-MCNC: 41 MG/DL (ref 6–23)
CALCIUM SERPL-MCNC: 7.9 MG/DL (ref 8.3–10.6)
CHLORIDE BLD-SCNC: 95 MMOL/L (ref 99–110)
CO2: 29 MMOL/L (ref 21–32)
COMMENT: ABNORMAL
CREAT SERPL-MCNC: 4.6 MG/DL (ref 0.9–1.3)
DIFFERENTIAL TYPE: ABNORMAL
EOSINOPHILS ABSOLUTE: 0.2 K/CU MM
EOSINOPHILS RELATIVE PERCENT: 2.5 % (ref 0–3)
GFR, ESTIMATED: 15 ML/MIN/1.73M2
GLUCOSE SERPL-MCNC: 203 MG/DL (ref 70–99)
HCO3 VENOUS: 35.4 MMOL/L (ref 22–29)
HCT VFR BLD CALC: 31.6 % (ref 42–52)
HEMOGLOBIN: 9.8 GM/DL (ref 13.5–18)
IMMATURE NEUTROPHIL %: 0.5 % (ref 0–0.43)
LACTATE: 1.1 MMOL/L (ref 0.5–1.9)
LYMPHOCYTES ABSOLUTE: 0.6 K/CU MM
LYMPHOCYTES RELATIVE PERCENT: 8.6 % (ref 24–44)
MAGNESIUM: 1.8 MG/DL (ref 1.8–2.4)
MCH RBC QN AUTO: 28.8 PG (ref 27–31)
MCHC RBC AUTO-ENTMCNC: 31 % (ref 32–36)
MCV RBC AUTO: 92.9 FL (ref 78–100)
MONOCYTES ABSOLUTE: 0.4 K/CU MM
MONOCYTES RELATIVE PERCENT: 5.8 % (ref 0–4)
NEUTROPHILS ABSOLUTE: 5.3 K/CU MM
NEUTROPHILS RELATIVE PERCENT: 82.1 % (ref 36–66)
NUCLEATED RBC %: 0 %
O2 SAT, VEN: 79 % (ref 50–70)
PCO2, VEN: 51 MMHG (ref 41–51)
PDW BLD-RTO: 16.6 % (ref 11.7–14.9)
PH VENOUS: 7.45 (ref 7.32–7.43)
PLATELET # BLD: 200 K/CU MM (ref 140–440)
PMV BLD AUTO: 9.8 FL (ref 7.5–11.1)
PO2, VEN: 53 MMHG (ref 28–48)
POTASSIUM SERPL-SCNC: 4.1 MMOL/L (ref 3.5–5.1)
PRO-BNP: ABNORMAL PG/ML
RBC # BLD: 3.4 M/CU MM (ref 4.6–6.2)
SODIUM BLD-SCNC: 137 MMOL/L (ref 135–145)
TOTAL IMMATURE NEUTOROPHIL: 0.03 K/CU MM
TOTAL NUCLEATED RBC: 0 K/CU MM
TOTAL PROTEIN: 7.4 GM/DL (ref 6.4–8.2)
TROPONIN, HIGH SENSITIVITY: 136 NG/L (ref 0–22)
WBC # BLD: 6.4 K/CU MM (ref 4–10.5)

## 2024-06-07 PROCEDURE — 84484 ASSAY OF TROPONIN QUANT: CPT

## 2024-06-07 PROCEDURE — 84145 PROCALCITONIN (PCT): CPT

## 2024-06-07 PROCEDURE — 83605 ASSAY OF LACTIC ACID: CPT

## 2024-06-07 PROCEDURE — 80053 COMPREHEN METABOLIC PANEL: CPT

## 2024-06-07 PROCEDURE — 2140000000 HC CCU INTERMEDIATE R&B

## 2024-06-07 PROCEDURE — 83735 ASSAY OF MAGNESIUM: CPT

## 2024-06-07 PROCEDURE — 71045 X-RAY EXAM CHEST 1 VIEW: CPT

## 2024-06-07 PROCEDURE — 2580000003 HC RX 258

## 2024-06-07 PROCEDURE — 99285 EMERGENCY DEPT VISIT HI MDM: CPT

## 2024-06-07 PROCEDURE — 83880 ASSAY OF NATRIURETIC PEPTIDE: CPT

## 2024-06-07 PROCEDURE — 82805 BLOOD GASES W/O2 SATURATION: CPT

## 2024-06-07 PROCEDURE — 6370000000 HC RX 637 (ALT 250 FOR IP)

## 2024-06-07 PROCEDURE — 93005 ELECTROCARDIOGRAM TRACING: CPT | Performed by: PHYSICIAN ASSISTANT

## 2024-06-07 PROCEDURE — 85025 COMPLETE CBC W/AUTO DIFF WBC: CPT

## 2024-06-07 PROCEDURE — 6360000002 HC RX W HCPCS

## 2024-06-07 RX ORDER — CALCIUM CARBONATE 500 MG/1
2 TABLET, CHEWABLE ORAL
Status: DISCONTINUED | OUTPATIENT
Start: 2024-06-08 | End: 2024-06-17 | Stop reason: HOSPADM

## 2024-06-07 RX ORDER — OXYCODONE AND ACETAMINOPHEN 7.5; 325 MG/1; MG/1
1 TABLET ORAL EVERY 4 HOURS PRN
Status: DISCONTINUED | OUTPATIENT
Start: 2024-06-07 | End: 2024-06-07 | Stop reason: SDUPTHER

## 2024-06-07 RX ORDER — SODIUM CHLORIDE 0.9 % (FLUSH) 0.9 %
5-40 SYRINGE (ML) INJECTION EVERY 12 HOURS SCHEDULED
Status: DISCONTINUED | OUTPATIENT
Start: 2024-06-07 | End: 2024-06-17 | Stop reason: HOSPADM

## 2024-06-07 RX ORDER — INSULIN LISPRO 100 [IU]/ML
0-8 INJECTION, SOLUTION INTRAVENOUS; SUBCUTANEOUS
Status: DISCONTINUED | OUTPATIENT
Start: 2024-06-08 | End: 2024-06-17 | Stop reason: HOSPADM

## 2024-06-07 RX ORDER — AMIODARONE HYDROCHLORIDE 200 MG/1
400 TABLET ORAL 2 TIMES DAILY
Status: DISCONTINUED | OUTPATIENT
Start: 2024-06-08 | End: 2024-06-09

## 2024-06-07 RX ORDER — ACETAMINOPHEN 325 MG/1
650 TABLET ORAL EVERY 6 HOURS PRN
Status: DISCONTINUED | OUTPATIENT
Start: 2024-06-07 | End: 2024-06-17 | Stop reason: HOSPADM

## 2024-06-07 RX ORDER — AMIODARONE HYDROCHLORIDE 150 MG/3ML
150 INJECTION, SOLUTION INTRAVENOUS ONCE
Status: DISCONTINUED | OUTPATIENT
Start: 2024-06-07 | End: 2024-06-07

## 2024-06-07 RX ORDER — METRONIDAZOLE 250 MG/1
500 TABLET ORAL 3 TIMES DAILY
Status: DISCONTINUED | OUTPATIENT
Start: 2024-06-07 | End: 2024-06-17 | Stop reason: HOSPADM

## 2024-06-07 RX ORDER — CALCITRIOL 0.25 UG/1
0.5 CAPSULE, LIQUID FILLED ORAL 2 TIMES DAILY
Status: DISCONTINUED | OUTPATIENT
Start: 2024-06-07 | End: 2024-06-17 | Stop reason: HOSPADM

## 2024-06-07 RX ORDER — ONDANSETRON 4 MG/1
4 TABLET, ORALLY DISINTEGRATING ORAL EVERY 8 HOURS PRN
Status: DISCONTINUED | OUTPATIENT
Start: 2024-06-07 | End: 2024-06-17 | Stop reason: HOSPADM

## 2024-06-07 RX ORDER — CARVEDILOL 25 MG/1
25 TABLET ORAL 2 TIMES DAILY WITH MEALS
Status: DISCONTINUED | OUTPATIENT
Start: 2024-06-08 | End: 2024-06-17 | Stop reason: HOSPADM

## 2024-06-07 RX ORDER — INSULIN LISPRO 100 [IU]/ML
0-4 INJECTION, SOLUTION INTRAVENOUS; SUBCUTANEOUS NIGHTLY
Status: DISCONTINUED | OUTPATIENT
Start: 2024-06-07 | End: 2024-06-17 | Stop reason: HOSPADM

## 2024-06-07 RX ORDER — ONDANSETRON 2 MG/ML
4 INJECTION INTRAMUSCULAR; INTRAVENOUS EVERY 6 HOURS PRN
Status: DISCONTINUED | OUTPATIENT
Start: 2024-06-07 | End: 2024-06-17 | Stop reason: HOSPADM

## 2024-06-07 RX ORDER — VITAMIN B COMPLEX
5000 TABLET ORAL DAILY
Status: DISCONTINUED | OUTPATIENT
Start: 2024-06-08 | End: 2024-06-17 | Stop reason: HOSPADM

## 2024-06-07 RX ORDER — INSULIN GLARGINE 100 [IU]/ML
10 INJECTION, SOLUTION SUBCUTANEOUS NIGHTLY
Status: DISCONTINUED | OUTPATIENT
Start: 2024-06-07 | End: 2024-06-17 | Stop reason: HOSPADM

## 2024-06-07 RX ORDER — ALBUTEROL SULFATE 90 UG/1
2 AEROSOL, METERED RESPIRATORY (INHALATION) 4 TIMES DAILY PRN
Status: DISCONTINUED | OUTPATIENT
Start: 2024-06-07 | End: 2024-06-17 | Stop reason: HOSPADM

## 2024-06-07 RX ORDER — POLYETHYLENE GLYCOL 3350 17 G/17G
17 POWDER, FOR SOLUTION ORAL DAILY PRN
Status: DISCONTINUED | OUTPATIENT
Start: 2024-06-07 | End: 2024-06-17 | Stop reason: HOSPADM

## 2024-06-07 RX ORDER — SODIUM CHLORIDE 0.9 % (FLUSH) 0.9 %
5-40 SYRINGE (ML) INJECTION PRN
Status: DISCONTINUED | OUTPATIENT
Start: 2024-06-07 | End: 2024-06-17 | Stop reason: HOSPADM

## 2024-06-07 RX ORDER — HEPARIN SODIUM 5000 [USP'U]/ML
5000 INJECTION, SOLUTION INTRAVENOUS; SUBCUTANEOUS EVERY 8 HOURS SCHEDULED
Status: DISCONTINUED | OUTPATIENT
Start: 2024-06-07 | End: 2024-06-17 | Stop reason: HOSPADM

## 2024-06-07 RX ORDER — OXYCODONE AND ACETAMINOPHEN 7.5; 325 MG/1; MG/1
1 TABLET ORAL EVERY 8 HOURS PRN
Status: DISCONTINUED | OUTPATIENT
Start: 2024-06-07 | End: 2024-06-17 | Stop reason: HOSPADM

## 2024-06-07 RX ORDER — DEXTROSE MONOHYDRATE 100 MG/ML
INJECTION, SOLUTION INTRAVENOUS CONTINUOUS PRN
Status: DISCONTINUED | OUTPATIENT
Start: 2024-06-07 | End: 2024-06-17 | Stop reason: HOSPADM

## 2024-06-07 RX ORDER — AMLODIPINE BESYLATE 5 MG/1
5 TABLET ORAL DAILY
Status: DISCONTINUED | OUTPATIENT
Start: 2024-06-08 | End: 2024-06-17 | Stop reason: HOSPADM

## 2024-06-07 RX ORDER — FUROSEMIDE 10 MG/ML
80 INJECTION INTRAMUSCULAR; INTRAVENOUS ONCE
Status: COMPLETED | OUTPATIENT
Start: 2024-06-07 | End: 2024-06-08

## 2024-06-07 RX ORDER — ACETAMINOPHEN 650 MG/1
650 SUPPOSITORY RECTAL EVERY 6 HOURS PRN
Status: DISCONTINUED | OUTPATIENT
Start: 2024-06-07 | End: 2024-06-17 | Stop reason: HOSPADM

## 2024-06-07 RX ORDER — GLUCAGON 1 MG/ML
1 KIT INJECTION PRN
Status: DISCONTINUED | OUTPATIENT
Start: 2024-06-07 | End: 2024-06-17 | Stop reason: HOSPADM

## 2024-06-07 RX ORDER — ATORVASTATIN CALCIUM 40 MG/1
40 TABLET, FILM COATED ORAL NIGHTLY
Status: DISCONTINUED | OUTPATIENT
Start: 2024-06-07 | End: 2024-06-17 | Stop reason: HOSPADM

## 2024-06-07 RX ORDER — SODIUM CHLORIDE 9 MG/ML
INJECTION, SOLUTION INTRAVENOUS PRN
Status: DISCONTINUED | OUTPATIENT
Start: 2024-06-07 | End: 2024-06-17 | Stop reason: HOSPADM

## 2024-06-07 RX ORDER — HYDRALAZINE HYDROCHLORIDE 25 MG/1
25 TABLET, FILM COATED ORAL EVERY 8 HOURS SCHEDULED
Status: DISCONTINUED | OUTPATIENT
Start: 2024-06-07 | End: 2024-06-17 | Stop reason: HOSPADM

## 2024-06-07 RX ADMIN — OXYCODONE AND ACETAMINOPHEN 1 TABLET: 7.5; 325 TABLET ORAL at 20:29

## 2024-06-07 RX ADMIN — AMIODARONE HYDROCHLORIDE 150 MG: 50 INJECTION, SOLUTION INTRAVENOUS at 22:00

## 2024-06-07 ASSESSMENT — PAIN DESCRIPTION - LOCATION
LOCATION: LEG
LOCATION: LEG
LOCATION: FOOT

## 2024-06-07 ASSESSMENT — PAIN DESCRIPTION - DESCRIPTORS
DESCRIPTORS: SHARP
DESCRIPTORS: SHARP

## 2024-06-07 ASSESSMENT — LIFESTYLE VARIABLES
HOW OFTEN DO YOU HAVE A DRINK CONTAINING ALCOHOL: NEVER
HOW MANY STANDARD DRINKS CONTAINING ALCOHOL DO YOU HAVE ON A TYPICAL DAY: PATIENT DOES NOT DRINK

## 2024-06-07 ASSESSMENT — PAIN DESCRIPTION - ORIENTATION
ORIENTATION: LEFT

## 2024-06-07 ASSESSMENT — PAIN SCALES - GENERAL
PAINLEVEL_OUTOF10: 6
PAINLEVEL_OUTOF10: 8
PAINLEVEL_OUTOF10: 8

## 2024-06-07 NOTE — ED NOTES
Patient placed on 2L Nc for sob. While at bedside this rn noted increased work of breathing with stat ranging from 86 - 93% on room air. Patient reports wearing 3L NC at home when lying down/sleeping.

## 2024-06-07 NOTE — ED TRIAGE NOTES
Patient presents with c/o fluid overload, sob, lightheadedness. States he just left dialysis and was told he is still 17kg over.

## 2024-06-08 LAB
ANION GAP SERPL CALCULATED.3IONS-SCNC: 15 MMOL/L (ref 7–16)
BASOPHILS ABSOLUTE: 0.1 K/CU MM
BASOPHILS RELATIVE PERCENT: 0.9 % (ref 0–1)
BUN SERPL-MCNC: 27 MG/DL (ref 6–23)
CALCIUM SERPL-MCNC: 9.1 MG/DL (ref 8.3–10.6)
CHLORIDE BLD-SCNC: 98 MMOL/L (ref 99–110)
CO2: 25 MMOL/L (ref 21–32)
CREAT SERPL-MCNC: 3.4 MG/DL (ref 0.9–1.3)
DIFFERENTIAL TYPE: ABNORMAL
EOSINOPHILS ABSOLUTE: 0.2 K/CU MM
EOSINOPHILS RELATIVE PERCENT: 3.1 % (ref 0–3)
GFR, ESTIMATED: 21 ML/MIN/1.73M2
GLUCOSE BLD-MCNC: 128 MG/DL (ref 70–99)
GLUCOSE BLD-MCNC: 147 MG/DL (ref 70–99)
GLUCOSE BLD-MCNC: 173 MG/DL (ref 70–99)
GLUCOSE SERPL-MCNC: 114 MG/DL (ref 70–99)
HCT VFR BLD CALC: 31.8 % (ref 42–52)
HEMOGLOBIN: 9.9 GM/DL (ref 13.5–18)
IMMATURE NEUTROPHIL %: 0.3 % (ref 0–0.43)
LYMPHOCYTES ABSOLUTE: 0.5 K/CU MM
LYMPHOCYTES RELATIVE PERCENT: 8.2 % (ref 24–44)
MCH RBC QN AUTO: 28.7 PG (ref 27–31)
MCHC RBC AUTO-ENTMCNC: 31.1 % (ref 32–36)
MCV RBC AUTO: 92.2 FL (ref 78–100)
MONOCYTES ABSOLUTE: 0.5 K/CU MM
MONOCYTES RELATIVE PERCENT: 8.4 % (ref 0–4)
NEUTROPHILS ABSOLUTE: 4.5 K/CU MM
NEUTROPHILS RELATIVE PERCENT: 79.1 % (ref 36–66)
NUCLEATED RBC %: 0 %
PDW BLD-RTO: 16.6 % (ref 11.7–14.9)
PLATELET # BLD: 183 K/CU MM (ref 140–440)
PMV BLD AUTO: 10.2 FL (ref 7.5–11.1)
POTASSIUM SERPL-SCNC: 4.3 MMOL/L (ref 3.5–5.1)
PROCALCITONIN SERPL-MCNC: 0.61 NG/ML
RBC # BLD: 3.45 M/CU MM (ref 4.6–6.2)
SODIUM BLD-SCNC: 138 MMOL/L (ref 135–145)
TOTAL IMMATURE NEUTOROPHIL: 0.02 K/CU MM
TOTAL NUCLEATED RBC: 0 K/CU MM
WBC # BLD: 5.7 K/CU MM (ref 4–10.5)

## 2024-06-08 PROCEDURE — 99222 1ST HOSP IP/OBS MODERATE 55: CPT | Performed by: INTERNAL MEDICINE

## 2024-06-08 PROCEDURE — 85025 COMPLETE CBC W/AUTO DIFF WBC: CPT

## 2024-06-08 PROCEDURE — 6370000000 HC RX 637 (ALT 250 FOR IP): Performed by: INTERNAL MEDICINE

## 2024-06-08 PROCEDURE — 90935 HEMODIALYSIS ONE EVALUATION: CPT

## 2024-06-08 PROCEDURE — 5A1D70Z PERFORMANCE OF URINARY FILTRATION, INTERMITTENT, LESS THAN 6 HOURS PER DAY: ICD-10-PCS | Performed by: INTERNAL MEDICINE

## 2024-06-08 PROCEDURE — 2140000000 HC CCU INTERMEDIATE R&B

## 2024-06-08 PROCEDURE — 6370000000 HC RX 637 (ALT 250 FOR IP)

## 2024-06-08 PROCEDURE — 2700000000 HC OXYGEN THERAPY PER DAY

## 2024-06-08 PROCEDURE — 6360000002 HC RX W HCPCS: Performed by: INTERNAL MEDICINE

## 2024-06-08 PROCEDURE — 6360000002 HC RX W HCPCS

## 2024-06-08 PROCEDURE — 94761 N-INVAS EAR/PLS OXIMETRY MLT: CPT

## 2024-06-08 PROCEDURE — 82962 GLUCOSE BLOOD TEST: CPT

## 2024-06-08 PROCEDURE — 80202 ASSAY OF VANCOMYCIN: CPT

## 2024-06-08 PROCEDURE — 2580000003 HC RX 258: Performed by: INTERNAL MEDICINE

## 2024-06-08 PROCEDURE — 6370000000 HC RX 637 (ALT 250 FOR IP): Performed by: STUDENT IN AN ORGANIZED HEALTH CARE EDUCATION/TRAINING PROGRAM

## 2024-06-08 PROCEDURE — 80048 BASIC METABOLIC PNL TOTAL CA: CPT

## 2024-06-08 RX ORDER — LANOLIN ALCOHOL/MO/W.PET/CERES
400 CREAM (GRAM) TOPICAL ONCE
Status: COMPLETED | OUTPATIENT
Start: 2024-06-08 | End: 2024-06-08

## 2024-06-08 RX ADMIN — AMIODARONE HYDROCHLORIDE 400 MG: 200 TABLET ORAL at 22:08

## 2024-06-08 RX ADMIN — CARVEDILOL 25 MG: 25 TABLET, FILM COATED ORAL at 12:27

## 2024-06-08 RX ADMIN — HYDRALAZINE HYDROCHLORIDE 25 MG: 25 TABLET ORAL at 05:33

## 2024-06-08 RX ADMIN — ONDANSETRON 4 MG: 2 INJECTION INTRAMUSCULAR; INTRAVENOUS at 05:32

## 2024-06-08 RX ADMIN — AMLODIPINE BESYLATE 5 MG: 5 TABLET ORAL at 12:27

## 2024-06-08 RX ADMIN — OXYCODONE AND ACETAMINOPHEN 1 TABLET: 7.5; 325 TABLET ORAL at 12:27

## 2024-06-08 RX ADMIN — ATORVASTATIN CALCIUM 40 MG: 40 TABLET, FILM COATED ORAL at 00:33

## 2024-06-08 RX ADMIN — INSULIN GLARGINE 10 UNITS: 100 INJECTION, SOLUTION SUBCUTANEOUS at 22:15

## 2024-06-08 RX ADMIN — Medication 5000 UNITS: at 12:26

## 2024-06-08 RX ADMIN — AMIODARONE HYDROCHLORIDE 400 MG: 200 TABLET ORAL at 12:27

## 2024-06-08 RX ADMIN — SODIUM CHLORIDE, PRESERVATIVE FREE 10 ML: 5 INJECTION INTRAVENOUS at 00:34

## 2024-06-08 RX ADMIN — HEPARIN SODIUM 5000 UNITS: 5000 INJECTION INTRAVENOUS; SUBCUTANEOUS at 00:33

## 2024-06-08 RX ADMIN — FUROSEMIDE 80 MG: 10 INJECTION, SOLUTION INTRAMUSCULAR; INTRAVENOUS at 00:33

## 2024-06-08 RX ADMIN — METRONIDAZOLE 500 MG: 250 TABLET ORAL at 16:21

## 2024-06-08 RX ADMIN — OXYCODONE AND ACETAMINOPHEN 1 TABLET: 7.5; 325 TABLET ORAL at 00:32

## 2024-06-08 RX ADMIN — HYDRALAZINE HYDROCHLORIDE 25 MG: 25 TABLET ORAL at 15:00

## 2024-06-08 RX ADMIN — CALCITRIOL CAPSULES 0.25 MCG 0.5 MCG: 0.25 CAPSULE ORAL at 00:33

## 2024-06-08 RX ADMIN — CALCIUM CARBONATE 1000 MG: 500 TABLET, CHEWABLE ORAL at 17:16

## 2024-06-08 RX ADMIN — ATORVASTATIN CALCIUM 40 MG: 40 TABLET, FILM COATED ORAL at 22:08

## 2024-06-08 RX ADMIN — CALCITRIOL CAPSULES 0.25 MCG 0.5 MCG: 0.25 CAPSULE ORAL at 22:08

## 2024-06-08 RX ADMIN — HEPARIN SODIUM 5000 UNITS: 5000 INJECTION INTRAVENOUS; SUBCUTANEOUS at 15:00

## 2024-06-08 RX ADMIN — HYDRALAZINE HYDROCHLORIDE 25 MG: 25 TABLET ORAL at 00:32

## 2024-06-08 RX ADMIN — CALCIUM CARBONATE 1000 MG: 500 TABLET, CHEWABLE ORAL at 12:26

## 2024-06-08 RX ADMIN — METRONIDAZOLE 500 MG: 250 TABLET ORAL at 22:08

## 2024-06-08 RX ADMIN — CARVEDILOL 25 MG: 25 TABLET, FILM COATED ORAL at 17:16

## 2024-06-08 RX ADMIN — OXYCODONE AND ACETAMINOPHEN 1 TABLET: 7.5; 325 TABLET ORAL at 22:14

## 2024-06-08 RX ADMIN — Medication 400 MG: at 12:27

## 2024-06-08 RX ADMIN — METRONIDAZOLE 500 MG: 250 TABLET ORAL at 00:33

## 2024-06-08 ASSESSMENT — PAIN DESCRIPTION - ONSET: ONSET: PROGRESSIVE

## 2024-06-08 ASSESSMENT — PAIN DESCRIPTION - ORIENTATION
ORIENTATION: LEFT

## 2024-06-08 ASSESSMENT — PAIN SCALES - GENERAL
PAINLEVEL_OUTOF10: 7
PAINLEVEL_OUTOF10: 3
PAINLEVEL_OUTOF10: 3
PAINLEVEL_OUTOF10: 7

## 2024-06-08 ASSESSMENT — PAIN DESCRIPTION - DESCRIPTORS
DESCRIPTORS: BURNING
DESCRIPTORS: ACHING
DESCRIPTORS: ACHING

## 2024-06-08 ASSESSMENT — PAIN DESCRIPTION - LOCATION
LOCATION: LEG
LOCATION: FOOT
LOCATION: FOOT;LEG
LOCATION: LEG;FOOT

## 2024-06-08 ASSESSMENT — PAIN DESCRIPTION - FREQUENCY: FREQUENCY: CONTINUOUS

## 2024-06-08 ASSESSMENT — PAIN - FUNCTIONAL ASSESSMENT: PAIN_FUNCTIONAL_ASSESSMENT: ACTIVITIES ARE NOT PREVENTED

## 2024-06-08 NOTE — ACP (ADVANCE CARE PLANNING)
Code status discussed with patient- opted FULL CODE  Surrogate decision maker- RufinoMiriamYovanny Levine- [phone]

## 2024-06-08 NOTE — ED PROVIDER NOTES
I independently examined and evaluated Yovanny Levine.  I personally saw the patient and made/approved the management plan and take responsibility of the patient management.     In brief their history revealed patient is here with increasing shortness of breath abdominal distention and concern for fluid overload despite attending dialysis.  Patient reports they were only able to pull off a small amount of fluid today dialysis and he was sent to the emergency department for further evaluation.  Patient does have intermittent lightheadedness.  Patient denies any chest pain or palpitations.  Patient has reportedly gained approximately 30 pounds.  Patient is recovering from osteomyelitis and has wound VAC in place to his foot.  Patient is on vancomycin and will be starting Flagyl for this as well.    Their focused exam revealed the patient is afebrile, hypertensive but otherwise hemodynamically stable on 2 L nasal cannula.  The patient appears older than stated age and quite deconditioned. Mucous membranes are moist. Speech is clear. Breathing is unlabored.  There is some abdominal distention and anasarca present.  Skin is dry. Mental status is normal. The patient moves all extremities and is without facial droop.  Wound VAC in place to left lower extremity.    Results for orders placed or performed during the hospital encounter of 06/07/24   CBC with Auto Differential   Result Value Ref Range    WBC 6.4 4.0 - 10.5 K/CU MM    RBC 3.40 (L) 4.6 - 6.2 M/CU MM    Hemoglobin 9.8 (L) 13.5 - 18.0 GM/DL    Hematocrit 31.6 (L) 42 - 52 %    MCV 92.9 78 - 100 FL    MCH 28.8 27 - 31 PG    MCHC 31.0 (L) 32.0 - 36.0 %    RDW 16.6 (H) 11.7 - 14.9 %    Platelets 200 140 - 440 K/CU MM    MPV 9.8 7.5 - 11.1 FL    Differential Type AUTOMATED DIFFERENTIAL     Neutrophils % 82.1 (H) 36 - 66 %    Lymphocytes % 8.6 (L) 24 - 44 %    Monocytes % 5.8 (H) 0 - 4 %    Eosinophils % 2.5 0 - 3 %    Basophils % 0.5 0 - 1 %    Neutrophils Absolute 5.3

## 2024-06-08 NOTE — PLAN OF CARE
Problem: Discharge Planning  Goal: Discharge to home or other facility with appropriate resources  Outcome: Progressing     Problem: Safety - Adult  Goal: Free from fall injury  Outcome: Progressing     Problem: Respiratory - Adult  Goal: Achieves optimal ventilation and oxygenation  Outcome: Progressing     Problem: Musculoskeletal - Adult  Goal: Return mobility to safest level of function  Outcome: Progressing  Goal: Maintain proper alignment of affected body part  Outcome: Progressing  Goal: Return ADL status to a safe level of function  Outcome: Progressing     Problem: Metabolic/Fluid and Electrolytes - Adult  Goal: Electrolytes maintained within normal limits  Outcome: Progressing  Goal: Hemodynamic stability and optimal renal function maintained  Outcome: Progressing  Goal: Glucose maintained within prescribed range  Outcome: Progressing

## 2024-06-08 NOTE — ED NOTES
The pt is resting in bed, is on the monitor, has a call light in reach, and is not in acute distress, and denies any needs at this time

## 2024-06-08 NOTE — ED NOTES
Pt is resting in bed, has call light within reach, and is not in acute distress, and denies any needs at this time

## 2024-06-08 NOTE — ED PROVIDER NOTES
[facility] EMERGENCY DEPARTMENT  EMERGENCY DEPARTMENT ENCOUNTER        Pt Name: Yovanny Levine  MRN: [id number]  Birthdate 1975  Date of evaluation: 6/7/2024  Provider: APPLE Yadav - CNP  PCP: José Luis Izquierdo MD  Note Started: 10:00 PM EDT 6/7/24       I have seen and evaluated this patient with my supervising physician Dr Dumont .      CHIEF COMPLAINT       Chief Complaint   Patient presents with    Shortness of Breath     Just left dialysis states \"fluid overload, sob, lightheaded\"       HISTORY OF PRESENT ILLNESS: 1 or more Elements     History From: Patient    Limitations to history : None    Social Determinants Significantly Affecting Health : None    Chief Complaint: Increased shortness of breath, increased abdominal girth distention from fluid overload, sent from dialysis for inpatient fluid management    Yovanny Levine is a 49 y.o. male with significant medical history end-stage renal disease on dialysis, hypertension, CAD post stents, chronic anemia, type 2 diabetes mellitus, recent osteomyelitis with left foot wound VAC.  Who presents to ED stating he had dialysis today.  Stated he has been going as scheduled 3 times weekly.  States that while he was at dialysis he gained 15 kg in weight and they were concerned for fluid volume overload.  They sent him to the emergency department.  He denies any chest pain or palpitations.  States he does feel increasingly short of breath.  States he uses 2 L nasal cannula at nighttime.  States because of the shortness of breath they started him on oxygen on arrival.  Denies any nasal congestion or increased cough.  Denies any abdominal pain fevers hematuria dysuria.  States he does still make some urine.  States he is taking his medications as prescribed.  States his wound VAC is functioning properly denies any pain to the area but does state that he was evaluated by surgery who is suspicious that he may need a

## 2024-06-08 NOTE — H&P
History and Physical      Name:  Yovanny Levine /Age/Sex: 1975  (49 y.o. male)   MRN & CSN:  [id number] & [id number] Encounter Date/Time: 2024 9:52 PM EDT   Location:  ED19/ED-19 PCP: José Luis Izquierdo MD       Hospital Day: 1    Assessment and Plan:     #.  Hypervolemia  -As per information patient has weight gain of around 17 kgs.  -Patient had completion of dialysis today  -Lasix 80 mg IV ordered by Dr. Parmar  -Patient to get dialyzed again in AM.    #.  Ventricular tachycardia  -As per information patient had 42 beats run of V. tach in ER  -Cardiology consulted  -Patient received amiodarone bolus  -Amiodarone 400 mg twice daily ordered      #.  Chronically elevated troponin    #.  Left foot osteomyelitis  -Patient has a wound VAC  -Patient is on vancomycin, Flagyl  -As per discharge summary indicated for vancomycin 2024    #.  Hypertension  -Continue home medications-amlodipine, carvedilol, hydralazine     #.  ESRD on HD  -Dr Parmar consulted from ED  -Patient is on calcium acetate, calcitriol, calcium carbonate     #.  PUD  -EGD-2023-1 nonbleeding gastric ulcer noted in the gastric fundus.      #.  Coronary artery disease  -History of PCI and stenting to LAD, left circumflex  -[facility]-2022-patent stents  -Patient not taking aspirin   -On atorvastatin, carvedilol-continue     #.  Chronic normocytic anemia  -Monitor with repeat H&H     #.  Diabetes mellitus type 2, on long-term insulin  -Patient is on Lantus 10 units nightly, insulin sliding scale  -Continue above with hypoglycemia protocol.     #.  Diabetic foot ulcer  -S/p amputation of the distal phalanx of right great toe, amputation of the right fourth toe at the metatarsophalangeal joint  -Patient had left great toe transmetatarsal amputation-2023     #.  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  Hyperlipidemia-on atorvastatin     #.  Chronic pain-on Percocet     #.  Obesity with BMI 37.66    Disposition:   Current Living

## 2024-06-08 NOTE — ED NOTES
ED TO INPATIENT SBAR HANDOFF    Patient Name: Yovanny Levine   :  1975  49 y.o.   Preferred Name    Family/Caregiver Present no   Restraints no   C-SSRS: Risk of Suicide: No Risk  Sitter no   Sepsis Risk Score Sepsis Risk Score: 0.86      Situation  Chief Complaint   Patient presents with    Shortness of Breath     Just left dialysis states \"fluid overload, sob, lightheaded\"     Brief Description of Patient's Condition: pt to the ED with shortness of breath and had an episode of vtach  Mental Status: oriented, alert, coherent, logical, thought processes intact, and able to concentrate and follow conversation  Arrived from: home    Imaging:   XR CHEST PORTABLE   Final Result   1. As above.         Electronically signed by Estefany Ricks        Abnormal labs:   Abnormal Labs Reviewed   CBC WITH AUTO DIFFERENTIAL - Abnormal; Notable for the following components:       Result Value    RBC 3.40 (*)     Hemoglobin 9.8 (*)     Hematocrit 31.6 (*)     MCHC 31.0 (*)     RDW 16.6 (*)     Neutrophils % 82.1 (*)     Lymphocytes % 8.6 (*)     Monocytes % 5.8 (*)     Immature Neutrophil % 0.5 (*)     All other components within normal limits   COMPREHENSIVE METABOLIC PANEL - Abnormal; Notable for the following components:    Chloride 95 (*)     Glucose 203 (*)     BUN 41 (*)     Creatinine 4.6 (*)     Est, Glom Filt Rate 15 (*)     Calcium 7.9 (*)     All other components within normal limits   BRAIN NATRIURETIC PEPTIDE - Abnormal; Notable for the following components:    Pro-BNP >70,000 (*)     All other components within normal limits   TROPONIN - Abnormal; Notable for the following components:    Troponin, High Sensitivity 136 (*)     All other components within normal limits   BLOOD GAS, VENOUS - Abnormal; Notable for the following components:    pH, Néstor 7.45 (*)     pO2, Néstor 53 (*)     Base Exc, Mixed 9.7 (*)     HCO3, Venous 35.4 (*)     O2 Sat, Néstor 79.0 (*)     All other components within normal limits

## 2024-06-09 LAB
ANION GAP SERPL CALCULATED.3IONS-SCNC: 16 MMOL/L (ref 7–16)
BASOPHILS ABSOLUTE: 0 K/CU MM
BASOPHILS RELATIVE PERCENT: 0.8 % (ref 0–1)
BUN SERPL-MCNC: 53 MG/DL (ref 6–23)
CALCIUM SERPL-MCNC: 7.9 MG/DL (ref 8.3–10.6)
CHLORIDE BLD-SCNC: 97 MMOL/L (ref 99–110)
CO2: 24 MMOL/L (ref 21–32)
CREAT SERPL-MCNC: 5.7 MG/DL (ref 0.9–1.3)
CRP SERPL HS-MCNC: 18.4 MG/L
DIFFERENTIAL TYPE: ABNORMAL
DOSE AMOUNT: NORMAL
DOSE TIME: NORMAL
EOSINOPHILS ABSOLUTE: 0.2 K/CU MM
EOSINOPHILS RELATIVE PERCENT: 3.6 % (ref 0–3)
GFR, ESTIMATED: 11 ML/MIN/1.73M2
GLUCOSE BLD-MCNC: 105 MG/DL (ref 70–99)
GLUCOSE BLD-MCNC: 127 MG/DL (ref 70–99)
GLUCOSE BLD-MCNC: 144 MG/DL (ref 70–99)
GLUCOSE BLD-MCNC: 158 MG/DL (ref 70–99)
GLUCOSE SERPL-MCNC: 124 MG/DL (ref 70–99)
HCT VFR BLD CALC: 30.1 % (ref 42–52)
HEMOGLOBIN: 8.9 GM/DL (ref 13.5–18)
IMMATURE NEUTROPHIL %: 0.6 % (ref 0–0.43)
LYMPHOCYTES ABSOLUTE: 0.7 K/CU MM
LYMPHOCYTES RELATIVE PERCENT: 12.9 % (ref 24–44)
MCH RBC QN AUTO: 28.8 PG (ref 27–31)
MCHC RBC AUTO-ENTMCNC: 29.6 % (ref 32–36)
MCV RBC AUTO: 97.4 FL (ref 78–100)
MONOCYTES ABSOLUTE: 0.5 K/CU MM
MONOCYTES RELATIVE PERCENT: 9.6 % (ref 0–4)
NEUTROPHILS ABSOLUTE: 3.9 K/CU MM
NEUTROPHILS RELATIVE PERCENT: 72.5 % (ref 36–66)
NUCLEATED RBC %: 0 %
PDW BLD-RTO: 16.4 % (ref 11.7–14.9)
PLATELET # BLD: 163 K/CU MM (ref 140–440)
PMV BLD AUTO: 10.4 FL (ref 7.5–11.1)
POTASSIUM SERPL-SCNC: 5.1 MMOL/L (ref 3.5–5.1)
RBC # BLD: 3.09 M/CU MM (ref 4.6–6.2)
SODIUM BLD-SCNC: 137 MMOL/L (ref 135–145)
TOTAL IMMATURE NEUTOROPHIL: 0.03 K/CU MM
TOTAL NUCLEATED RBC: 0 K/CU MM
TROPONIN, HIGH SENSITIVITY: 136 NG/L (ref 0–22)
VANCOMYCIN RANDOM: 21.5 UG/ML
WBC # BLD: 5.3 K/CU MM (ref 4–10.5)

## 2024-06-09 PROCEDURE — 84484 ASSAY OF TROPONIN QUANT: CPT

## 2024-06-09 PROCEDURE — 86140 C-REACTIVE PROTEIN: CPT

## 2024-06-09 PROCEDURE — 82962 GLUCOSE BLOOD TEST: CPT

## 2024-06-09 PROCEDURE — 80202 ASSAY OF VANCOMYCIN: CPT

## 2024-06-09 PROCEDURE — 2700000000 HC OXYGEN THERAPY PER DAY

## 2024-06-09 PROCEDURE — 85025 COMPLETE CBC W/AUTO DIFF WBC: CPT

## 2024-06-09 PROCEDURE — 2140000000 HC CCU INTERMEDIATE R&B

## 2024-06-09 PROCEDURE — 94761 N-INVAS EAR/PLS OXIMETRY MLT: CPT

## 2024-06-09 PROCEDURE — 6370000000 HC RX 637 (ALT 250 FOR IP)

## 2024-06-09 PROCEDURE — 6370000000 HC RX 637 (ALT 250 FOR IP): Performed by: INTERNAL MEDICINE

## 2024-06-09 PROCEDURE — 6360000002 HC RX W HCPCS: Performed by: INTERNAL MEDICINE

## 2024-06-09 PROCEDURE — 99232 SBSQ HOSP IP/OBS MODERATE 35: CPT | Performed by: INTERNAL MEDICINE

## 2024-06-09 PROCEDURE — 2580000003 HC RX 258: Performed by: INTERNAL MEDICINE

## 2024-06-09 PROCEDURE — 80307 DRUG TEST PRSMV CHEM ANLYZR: CPT

## 2024-06-09 PROCEDURE — APPNB15 APP NON BILLABLE TIME 0-15 MINS

## 2024-06-09 PROCEDURE — 36415 COLL VENOUS BLD VENIPUNCTURE: CPT

## 2024-06-09 PROCEDURE — 80048 BASIC METABOLIC PNL TOTAL CA: CPT

## 2024-06-09 RX ORDER — ISOSORBIDE MONONITRATE 30 MG/1
30 TABLET, EXTENDED RELEASE ORAL DAILY
Status: DISCONTINUED | OUTPATIENT
Start: 2024-06-09 | End: 2024-06-17 | Stop reason: HOSPADM

## 2024-06-09 RX ORDER — AMIODARONE HYDROCHLORIDE 200 MG/1
400 TABLET ORAL DAILY
Status: DISCONTINUED | OUTPATIENT
Start: 2024-06-10 | End: 2024-06-10

## 2024-06-09 RX ADMIN — METRONIDAZOLE 500 MG: 250 TABLET ORAL at 13:56

## 2024-06-09 RX ADMIN — Medication 5000 UNITS: at 09:04

## 2024-06-09 RX ADMIN — CALCITRIOL CAPSULES 0.25 MCG 0.5 MCG: 0.25 CAPSULE ORAL at 09:04

## 2024-06-09 RX ADMIN — CALCIUM CARBONATE 1000 MG: 500 TABLET, CHEWABLE ORAL at 12:03

## 2024-06-09 RX ADMIN — CALCIUM CARBONATE 1000 MG: 500 TABLET, CHEWABLE ORAL at 09:04

## 2024-06-09 RX ADMIN — CARVEDILOL 25 MG: 25 TABLET, FILM COATED ORAL at 17:02

## 2024-06-09 RX ADMIN — HYDRALAZINE HYDROCHLORIDE 25 MG: 25 TABLET ORAL at 13:56

## 2024-06-09 RX ADMIN — SODIUM CHLORIDE, PRESERVATIVE FREE 10 ML: 5 INJECTION INTRAVENOUS at 12:04

## 2024-06-09 RX ADMIN — OXYCODONE AND ACETAMINOPHEN 1 TABLET: 7.5; 325 TABLET ORAL at 21:08

## 2024-06-09 RX ADMIN — SODIUM CHLORIDE, PRESERVATIVE FREE 10 ML: 5 INJECTION INTRAVENOUS at 21:08

## 2024-06-09 RX ADMIN — ISOSORBIDE MONONITRATE 30 MG: 30 TABLET, EXTENDED RELEASE ORAL at 17:02

## 2024-06-09 RX ADMIN — CALCIUM CARBONATE 1000 MG: 500 TABLET, CHEWABLE ORAL at 17:02

## 2024-06-09 RX ADMIN — HEPARIN SODIUM 5000 UNITS: 5000 INJECTION INTRAVENOUS; SUBCUTANEOUS at 21:07

## 2024-06-09 RX ADMIN — ONDANSETRON 4 MG: 2 INJECTION INTRAMUSCULAR; INTRAVENOUS at 17:42

## 2024-06-09 RX ADMIN — HEPARIN SODIUM 5000 UNITS: 5000 INJECTION INTRAVENOUS; SUBCUTANEOUS at 13:56

## 2024-06-09 RX ADMIN — OXYCODONE AND ACETAMINOPHEN 1 TABLET: 7.5; 325 TABLET ORAL at 09:04

## 2024-06-09 RX ADMIN — AMIODARONE HYDROCHLORIDE 400 MG: 200 TABLET ORAL at 09:04

## 2024-06-09 RX ADMIN — CALCITRIOL CAPSULES 0.25 MCG 0.5 MCG: 0.25 CAPSULE ORAL at 21:07

## 2024-06-09 RX ADMIN — ATORVASTATIN CALCIUM 40 MG: 40 TABLET, FILM COATED ORAL at 21:07

## 2024-06-09 RX ADMIN — METRONIDAZOLE 500 MG: 250 TABLET ORAL at 21:07

## 2024-06-09 RX ADMIN — METRONIDAZOLE 500 MG: 250 TABLET ORAL at 09:04

## 2024-06-09 ASSESSMENT — PAIN DESCRIPTION - ONSET: ONSET: ON-GOING

## 2024-06-09 ASSESSMENT — PAIN DESCRIPTION - DESCRIPTORS
DESCRIPTORS: ACHING

## 2024-06-09 ASSESSMENT — PAIN DESCRIPTION - ORIENTATION
ORIENTATION: LEFT

## 2024-06-09 ASSESSMENT — PAIN DESCRIPTION - PAIN TYPE: TYPE: CHRONIC PAIN;ACUTE PAIN

## 2024-06-09 ASSESSMENT — PAIN - FUNCTIONAL ASSESSMENT: PAIN_FUNCTIONAL_ASSESSMENT: ACTIVITIES ARE NOT PREVENTED

## 2024-06-09 ASSESSMENT — PAIN DESCRIPTION - LOCATION
LOCATION: FOOT;RIB CAGE
LOCATION: LEG
LOCATION: LEG

## 2024-06-09 ASSESSMENT — PAIN SCALES - GENERAL
PAINLEVEL_OUTOF10: 9
PAINLEVEL_OUTOF10: 0
PAINLEVEL_OUTOF10: 7

## 2024-06-09 ASSESSMENT — PAIN DESCRIPTION - FREQUENCY: FREQUENCY: INTERMITTENT

## 2024-06-09 NOTE — PLAN OF CARE
Problem: Discharge Planning  Goal: Discharge to home or other facility with appropriate resources  Outcome: Progressing     Problem: Safety - Adult  Goal: Free from fall injury  Outcome: Progressing     Problem: Respiratory - Adult  Goal: Achieves optimal ventilation and oxygenation  Outcome: Progressing     Problem: Musculoskeletal - Adult  Goal: Return mobility to safest level of function  Outcome: Progressing  Goal: Maintain proper alignment of affected body part  Outcome: Progressing  Goal: Return ADL status to a safe level of function  Outcome: Progressing     Problem: Metabolic/Fluid and Electrolytes - Adult  Goal: Electrolytes maintained within normal limits  Outcome: Progressing  Goal: Hemodynamic stability and optimal renal function maintained  Outcome: Progressing  Goal: Glucose maintained within prescribed range  Outcome: Progressing     Problem: Pain  Goal: Verbalizes/displays adequate comfort level or baseline comfort level  Outcome: Progressing     Problem: Skin/Tissue Integrity  Goal: Absence of new skin breakdown  Description: 1.  Monitor for areas of redness and/or skin breakdown  2.  Assess vascular access sites hourly  3.  Every 4-6 hours minimum:  Change oxygen saturation probe site  4.  Every 4-6 hours:  If on nasal continuous positive airway pressure, respiratory therapy assess nares and determine need for appliance change or resting period.  Outcome: Progressing

## 2024-06-09 NOTE — PLAN OF CARE
Problem: Safety - Adult  Goal: Free from fall injury  Outcome: Progressing     Problem: Respiratory - Adult  Goal: Achieves optimal ventilation and oxygenation  Outcome: Progressing     Problem: Metabolic/Fluid and Electrolytes - Adult  Goal: Electrolytes maintained within normal limits  Outcome: Progressing     Problem: Pain  Goal: Verbalizes/displays adequate comfort level or baseline comfort level  Outcome: Progressing     Problem: Skin/Tissue Integrity  Goal: Absence of new skin breakdown  Description: 1.  Monitor for areas of redness and/or skin breakdown  2.  Assess vascular access sites hourly  3.  Every 4-6 hours minimum:  Change oxygen saturation probe site  4.  Every 4-6 hours:  If on nasal continuous positive airway pressure, respiratory therapy assess nares and determine need for appliance change or resting period.  Outcome: Progressing

## 2024-06-10 ENCOUNTER — APPOINTMENT (OUTPATIENT)
Dept: NON INVASIVE DIAGNOSTICS | Age: 49
DRG: 291 | End: 2024-06-10
Payer: MEDICARE

## 2024-06-10 LAB
ANION GAP SERPL CALCULATED.3IONS-SCNC: 17 MMOL/L (ref 7–16)
BASOPHILS ABSOLUTE: 0 K/CU MM
BASOPHILS RELATIVE PERCENT: 0.6 % (ref 0–1)
BUN SERPL-MCNC: 73 MG/DL (ref 6–23)
CALCIUM SERPL-MCNC: 7.7 MG/DL (ref 8.3–10.6)
CHLORIDE BLD-SCNC: 96 MMOL/L (ref 99–110)
CO2: 26 MMOL/L (ref 21–32)
CREAT SERPL-MCNC: 7 MG/DL (ref 0.9–1.3)
CRP SERPL HS-MCNC: 15.1 MG/L
DIFFERENTIAL TYPE: ABNORMAL
DOSE AMOUNT: NORMAL
DOSE TIME: NORMAL
ECHO AO ROOT DIAM: 2.7 CM
ECHO AO ROOT INDEX: 1.15 CM/M2
ECHO AV AREA PEAK VELOCITY: 1.8 CM2
ECHO AV AREA VTI: 1.7 CM2
ECHO AV AREA/BSA PEAK VELOCITY: 0.8 CM2/M2
ECHO AV AREA/BSA VTI: 0.7 CM2/M2
ECHO AV MEAN GRADIENT: 7 MMHG
ECHO AV MEAN VELOCITY: 1.3 M/S
ECHO AV PEAK GRADIENT: 13 MMHG
ECHO AV PEAK VELOCITY: 1.8 M/S
ECHO AV VELOCITY RATIO: 0.56
ECHO AV VTI: 40.1 CM
ECHO BSA: 2.44 M2
ECHO EST RA PRESSURE: 15 MMHG
ECHO IVC PROX: 2.9 CM
ECHO LA AREA 4C: 27 CM2
ECHO LA DIAMETER INDEX: 2.21 CM/M2
ECHO LA DIAMETER: 5.2 CM
ECHO LA MAJOR AXIS: 6.9 CM
ECHO LA TO AORTIC ROOT RATIO: 1.93
ECHO LA VOL MOD A4C: 86 ML (ref 18–58)
ECHO LA VOLUME INDEX MOD A4C: 37 ML/M2 (ref 16–34)
ECHO LV E' LATERAL VELOCITY: 10 CM/S
ECHO LV E' SEPTAL VELOCITY: 8 CM/S
ECHO LV EDV A4C: 183 ML
ECHO LV EDV INDEX A4C: 78 ML/M2
ECHO LV EJECTION FRACTION A4C: 49 %
ECHO LV ESV A4C: 94 ML
ECHO LV ESV INDEX A4C: 40 ML/M2
ECHO LV FRACTIONAL SHORTENING: 29 % (ref 28–44)
ECHO LV INTERNAL DIMENSION DIASTOLE INDEX: 2.04 CM/M2
ECHO LV INTERNAL DIMENSION DIASTOLIC: 4.8 CM (ref 4.2–5.9)
ECHO LV INTERNAL DIMENSION SYSTOLIC INDEX: 1.45 CM/M2
ECHO LV INTERNAL DIMENSION SYSTOLIC: 3.4 CM
ECHO LV IVSD: 1.3 CM (ref 0.6–1)
ECHO LV MASS 2D: 232.2 G (ref 88–224)
ECHO LV MASS INDEX 2D: 98.8 G/M2 (ref 49–115)
ECHO LV POSTERIOR WALL DIASTOLIC: 1.2 CM (ref 0.6–1)
ECHO LV RELATIVE WALL THICKNESS RATIO: 0.5
ECHO LVOT AREA: 3.1 CM2
ECHO LVOT AV VTI INDEX: 0.53
ECHO LVOT DIAM: 2 CM
ECHO LVOT MEAN GRADIENT: 3 MMHG
ECHO LVOT PEAK GRADIENT: 4 MMHG
ECHO LVOT PEAK VELOCITY: 1 M/S
ECHO LVOT STROKE VOLUME INDEX: 28.6 ML/M2
ECHO LVOT SV: 67.2 ML
ECHO LVOT VTI: 21.4 CM
ECHO MV E DECELERATION TIME (DT): 190 MS
ECHO MV E VELOCITY: 1.21 M/S
ECHO MV E/E' LATERAL: 12.1
ECHO MV E/E' RATIO (AVERAGED): 13.61
ECHO MV E/E' SEPTAL: 15.13
ECHO RIGHT VENTRICULAR SYSTOLIC PRESSURE (RVSP): 48 MMHG
ECHO RV MID DIMENSION: 4.1 CM
ECHO TV REGURGITANT MAX VELOCITY: 2.89 M/S
ECHO TV REGURGITANT PEAK GRADIENT: 33 MMHG
EKG ATRIAL RATE: 89 BPM
EKG DIAGNOSIS: NORMAL
EKG P AXIS: 20 DEGREES
EKG P-R INTERVAL: 170 MS
EKG Q-T INTERVAL: 404 MS
EKG QRS DURATION: 96 MS
EKG QTC CALCULATION (BAZETT): 491 MS
EKG R AXIS: 32 DEGREES
EKG T AXIS: 115 DEGREES
EKG VENTRICULAR RATE: 89 BPM
EOSINOPHILS ABSOLUTE: 0.2 K/CU MM
EOSINOPHILS RELATIVE PERCENT: 3.4 % (ref 0–3)
GFR, ESTIMATED: 9 ML/MIN/1.73M2
GLUCOSE BLD-MCNC: 124 MG/DL (ref 70–99)
GLUCOSE BLD-MCNC: 126 MG/DL (ref 70–99)
GLUCOSE BLD-MCNC: 158 MG/DL (ref 70–99)
GLUCOSE SERPL-MCNC: 108 MG/DL (ref 70–99)
HCT VFR BLD CALC: 28.2 % (ref 42–52)
HEMOGLOBIN: 8.5 GM/DL (ref 13.5–18)
IMMATURE NEUTROPHIL %: 0.4 % (ref 0–0.43)
LYMPHOCYTES ABSOLUTE: 0.8 K/CU MM
LYMPHOCYTES RELATIVE PERCENT: 14.9 % (ref 24–44)
MCH RBC QN AUTO: 28.8 PG (ref 27–31)
MCHC RBC AUTO-ENTMCNC: 30.1 % (ref 32–36)
MCV RBC AUTO: 95.6 FL (ref 78–100)
MONOCYTES ABSOLUTE: 0.5 K/CU MM
MONOCYTES RELATIVE PERCENT: 9.1 % (ref 0–4)
NEUTROPHILS ABSOLUTE: 3.6 K/CU MM
NEUTROPHILS RELATIVE PERCENT: 71.6 % (ref 36–66)
NUCLEATED RBC %: 0 %
PDW BLD-RTO: 16.2 % (ref 11.7–14.9)
PLATELET # BLD: 177 K/CU MM (ref 140–440)
PMV BLD AUTO: 10.9 FL (ref 7.5–11.1)
POTASSIUM SERPL-SCNC: 5.4 MMOL/L (ref 3.5–5.1)
RBC # BLD: 2.95 M/CU MM (ref 4.6–6.2)
REASON FOR REJECTION: NORMAL
REJECTED TEST: NORMAL
SODIUM BLD-SCNC: 139 MMOL/L (ref 135–145)
TOTAL IMMATURE NEUTOROPHIL: 0.02 K/CU MM
TOTAL NUCLEATED RBC: 0 K/CU MM
VANCOMYCIN RANDOM: 21.5 UG/ML
WBC # BLD: 5.1 K/CU MM (ref 4–10.5)

## 2024-06-10 PROCEDURE — 97605 NEG PRS WND THER DME<=50SQCM: CPT

## 2024-06-10 PROCEDURE — 90935 HEMODIALYSIS ONE EVALUATION: CPT

## 2024-06-10 PROCEDURE — 93010 ELECTROCARDIOGRAM REPORT: CPT | Performed by: INTERNAL MEDICINE

## 2024-06-10 PROCEDURE — 2580000003 HC RX 258: Performed by: INTERNAL MEDICINE

## 2024-06-10 PROCEDURE — 36415 COLL VENOUS BLD VENIPUNCTURE: CPT

## 2024-06-10 PROCEDURE — C8929 TTE W OR WO FOL WCON,DOPPLER: HCPCS

## 2024-06-10 PROCEDURE — 6370000000 HC RX 637 (ALT 250 FOR IP): Performed by: INTERNAL MEDICINE

## 2024-06-10 PROCEDURE — 93306 TTE W/DOPPLER COMPLETE: CPT | Performed by: INTERNAL MEDICINE

## 2024-06-10 PROCEDURE — 2140000000 HC CCU INTERMEDIATE R&B

## 2024-06-10 PROCEDURE — APPNB15 APP NON BILLABLE TIME 0-15 MINS

## 2024-06-10 PROCEDURE — 6360000002 HC RX W HCPCS: Performed by: INTERNAL MEDICINE

## 2024-06-10 PROCEDURE — 82962 GLUCOSE BLOOD TEST: CPT

## 2024-06-10 PROCEDURE — 85025 COMPLETE CBC W/AUTO DIFF WBC: CPT

## 2024-06-10 PROCEDURE — 86140 C-REACTIVE PROTEIN: CPT

## 2024-06-10 PROCEDURE — 94761 N-INVAS EAR/PLS OXIMETRY MLT: CPT

## 2024-06-10 PROCEDURE — 80202 ASSAY OF VANCOMYCIN: CPT

## 2024-06-10 PROCEDURE — 99232 SBSQ HOSP IP/OBS MODERATE 35: CPT | Performed by: INTERNAL MEDICINE

## 2024-06-10 PROCEDURE — 6370000000 HC RX 637 (ALT 250 FOR IP)

## 2024-06-10 PROCEDURE — 2700000000 HC OXYGEN THERAPY PER DAY

## 2024-06-10 PROCEDURE — 6360000004 HC RX CONTRAST MEDICATION

## 2024-06-10 PROCEDURE — 80048 BASIC METABOLIC PNL TOTAL CA: CPT

## 2024-06-10 RX ORDER — CLOPIDOGREL BISULFATE 75 MG/1
75 TABLET ORAL DAILY
Status: DISCONTINUED | OUTPATIENT
Start: 2024-06-10 | End: 2024-06-17 | Stop reason: HOSPADM

## 2024-06-10 RX ORDER — AMIODARONE HYDROCHLORIDE 200 MG/1
200 TABLET ORAL DAILY
Status: DISCONTINUED | OUTPATIENT
Start: 2024-06-10 | End: 2024-06-17 | Stop reason: HOSPADM

## 2024-06-10 RX ORDER — MIDODRINE HYDROCHLORIDE 5 MG/1
10 TABLET ORAL ONCE
Status: COMPLETED | OUTPATIENT
Start: 2024-06-10 | End: 2024-06-10

## 2024-06-10 RX ADMIN — CALCIUM CARBONATE 1000 MG: 500 TABLET, CHEWABLE ORAL at 12:08

## 2024-06-10 RX ADMIN — MIDODRINE HYDROCHLORIDE 10 MG: 5 TABLET ORAL at 08:00

## 2024-06-10 RX ADMIN — CARVEDILOL 25 MG: 25 TABLET, FILM COATED ORAL at 16:35

## 2024-06-10 RX ADMIN — EPOETIN ALFA-EPBX 10000 UNITS: 10000 INJECTION, SOLUTION INTRAVENOUS; SUBCUTANEOUS at 15:41

## 2024-06-10 RX ADMIN — METRONIDAZOLE 500 MG: 250 TABLET ORAL at 22:03

## 2024-06-10 RX ADMIN — CALCITRIOL CAPSULES 0.25 MCG 0.5 MCG: 0.25 CAPSULE ORAL at 22:03

## 2024-06-10 RX ADMIN — CARVEDILOL 25 MG: 25 TABLET, FILM COATED ORAL at 08:01

## 2024-06-10 RX ADMIN — CLOPIDOGREL BISULFATE 75 MG: 75 TABLET ORAL at 16:35

## 2024-06-10 RX ADMIN — CALCIUM CARBONATE 1000 MG: 500 TABLET, CHEWABLE ORAL at 16:34

## 2024-06-10 RX ADMIN — HEPARIN SODIUM 5000 UNITS: 5000 INJECTION INTRAVENOUS; SUBCUTANEOUS at 22:04

## 2024-06-10 RX ADMIN — HYDRALAZINE HYDROCHLORIDE 25 MG: 25 TABLET ORAL at 22:03

## 2024-06-10 RX ADMIN — SODIUM CHLORIDE, PRESERVATIVE FREE 10 ML: 5 INJECTION INTRAVENOUS at 08:06

## 2024-06-10 RX ADMIN — OXYCODONE AND ACETAMINOPHEN 1 TABLET: 7.5; 325 TABLET ORAL at 06:10

## 2024-06-10 RX ADMIN — ATORVASTATIN CALCIUM 40 MG: 40 TABLET, FILM COATED ORAL at 22:04

## 2024-06-10 RX ADMIN — HEPARIN SODIUM 5000 UNITS: 5000 INJECTION INTRAVENOUS; SUBCUTANEOUS at 16:38

## 2024-06-10 RX ADMIN — METRONIDAZOLE 500 MG: 250 TABLET ORAL at 08:01

## 2024-06-10 RX ADMIN — Medication 5000 UNITS: at 08:00

## 2024-06-10 RX ADMIN — INSULIN LISPRO 2 UNITS: 100 INJECTION, SOLUTION INTRAVENOUS; SUBCUTANEOUS at 12:08

## 2024-06-10 RX ADMIN — HEPARIN SODIUM 5000 UNITS: 5000 INJECTION INTRAVENOUS; SUBCUTANEOUS at 06:09

## 2024-06-10 RX ADMIN — ISOSORBIDE MONONITRATE 30 MG: 30 TABLET, EXTENDED RELEASE ORAL at 08:01

## 2024-06-10 RX ADMIN — OXYCODONE AND ACETAMINOPHEN 1 TABLET: 7.5; 325 TABLET ORAL at 16:34

## 2024-06-10 RX ADMIN — SODIUM CHLORIDE, PRESERVATIVE FREE 10 ML: 5 INJECTION INTRAVENOUS at 22:04

## 2024-06-10 RX ADMIN — AMIODARONE HYDROCHLORIDE 200 MG: 200 TABLET ORAL at 16:38

## 2024-06-10 RX ADMIN — OXYCODONE AND ACETAMINOPHEN 1 TABLET: 7.5; 325 TABLET ORAL at 23:15

## 2024-06-10 RX ADMIN — HYDRALAZINE HYDROCHLORIDE 25 MG: 25 TABLET ORAL at 16:35

## 2024-06-10 RX ADMIN — PERFLUTREN 4 ML: 6.52 INJECTION, SUSPENSION INTRAVENOUS at 10:39

## 2024-06-10 RX ADMIN — CALCITRIOL CAPSULES 0.25 MCG 0.5 MCG: 0.25 CAPSULE ORAL at 08:00

## 2024-06-10 RX ADMIN — METRONIDAZOLE 500 MG: 250 TABLET ORAL at 16:35

## 2024-06-10 RX ADMIN — AMIODARONE HYDROCHLORIDE 400 MG: 200 TABLET ORAL at 08:00

## 2024-06-10 RX ADMIN — CALCIUM CARBONATE 1000 MG: 500 TABLET, CHEWABLE ORAL at 08:00

## 2024-06-10 ASSESSMENT — PAIN SCALES - GENERAL
PAINLEVEL_OUTOF10: 9
PAINLEVEL_OUTOF10: 9
PAINLEVEL_OUTOF10: 8
PAINLEVEL_OUTOF10: 8
PAINLEVEL_OUTOF10: 0
PAINLEVEL_OUTOF10: 1
PAINLEVEL_OUTOF10: 8
PAINLEVEL_OUTOF10: 8

## 2024-06-10 ASSESSMENT — PAIN DESCRIPTION - PAIN TYPE
TYPE: ACUTE PAIN
TYPE: CHRONIC PAIN;ACUTE PAIN
TYPE: ACUTE PAIN

## 2024-06-10 ASSESSMENT — PAIN DESCRIPTION - ONSET
ONSET: PROGRESSIVE
ONSET: PROGRESSIVE

## 2024-06-10 ASSESSMENT — PAIN DESCRIPTION - DESCRIPTORS
DESCRIPTORS: ACHING

## 2024-06-10 ASSESSMENT — PAIN DESCRIPTION - FREQUENCY
FREQUENCY: INTERMITTENT

## 2024-06-10 ASSESSMENT — PAIN DESCRIPTION - ORIENTATION
ORIENTATION: LEFT

## 2024-06-10 ASSESSMENT — PAIN DESCRIPTION - LOCATION
LOCATION: OTHER (COMMENT)
LOCATION: FOOT
LOCATION: LEG
LOCATION: FOOT

## 2024-06-10 ASSESSMENT — PAIN SCALES - WONG BAKER
WONGBAKER_NUMERICALRESPONSE: HURTS A LITTLE BIT
WONGBAKER_NUMERICALRESPONSE: HURTS A LITTLE BIT

## 2024-06-10 ASSESSMENT — PAIN - FUNCTIONAL ASSESSMENT
PAIN_FUNCTIONAL_ASSESSMENT: ACTIVITIES ARE NOT PREVENTED

## 2024-06-10 NOTE — CARE COORDINATION
Chart reviewed. Attempted to speak with pt in room. Not present or available. CM following. Will re attempt assessment as able.

## 2024-06-11 PROBLEM — I47.29 VENTRICULAR TACHYCARDIA (PAROXYSMAL) (HCC): Status: ACTIVE | Noted: 2024-06-11

## 2024-06-11 PROBLEM — I47.20 VENTRICULAR TACHYCARDIA (PAROXYSMAL): Status: ACTIVE | Noted: 2024-06-11

## 2024-06-11 LAB
AMPHETAMINES: NEGATIVE
ANION GAP SERPL CALCULATED.3IONS-SCNC: 14 MMOL/L (ref 7–16)
BARBITURATE SCREEN URINE: NEGATIVE
BASOPHILS ABSOLUTE: 0 K/CU MM
BASOPHILS RELATIVE PERCENT: 0.5 % (ref 0–1)
BENZODIAZEPINE SCREEN, URINE: NEGATIVE
BUN SERPL-MCNC: 65 MG/DL (ref 6–23)
CALCIUM SERPL-MCNC: 8.3 MG/DL (ref 8.3–10.6)
CANNABINOID SCREEN URINE: ABNORMAL
CHLORIDE BLD-SCNC: 98 MMOL/L (ref 99–110)
CO2: 26 MMOL/L (ref 21–32)
COCAINE METABOLITE: NEGATIVE
CREAT SERPL-MCNC: 6.7 MG/DL (ref 0.9–1.3)
CRP SERPL HS-MCNC: 12.3 MG/L
DIFFERENTIAL TYPE: ABNORMAL
EOSINOPHILS ABSOLUTE: 0.2 K/CU MM
EOSINOPHILS RELATIVE PERCENT: 2.6 % (ref 0–3)
FENTANYL URINE: NEGATIVE
GFR, ESTIMATED: 9 ML/MIN/1.73M2
GLUCOSE BLD-MCNC: 121 MG/DL (ref 70–99)
GLUCOSE BLD-MCNC: 176 MG/DL (ref 70–99)
GLUCOSE BLD-MCNC: 198 MG/DL (ref 70–99)
GLUCOSE SERPL-MCNC: 167 MG/DL (ref 70–99)
HCT VFR BLD CALC: 27.6 % (ref 42–52)
HEMOGLOBIN: 8.5 GM/DL (ref 13.5–18)
IMMATURE NEUTROPHIL %: 0.7 % (ref 0–0.43)
LYMPHOCYTES ABSOLUTE: 0.6 K/CU MM
LYMPHOCYTES RELATIVE PERCENT: 9.1 % (ref 24–44)
MCH RBC QN AUTO: 29.2 PG (ref 27–31)
MCHC RBC AUTO-ENTMCNC: 30.8 % (ref 32–36)
MCV RBC AUTO: 94.8 FL (ref 78–100)
MONOCYTES ABSOLUTE: 0.6 K/CU MM
MONOCYTES RELATIVE PERCENT: 10 % (ref 0–4)
NEUTROPHILS ABSOLUTE: 4.7 K/CU MM
NEUTROPHILS RELATIVE PERCENT: 77.1 % (ref 36–66)
NUCLEATED RBC %: 0 %
OPIATES, URINE: NEGATIVE
OXYCODONE: ABNORMAL
PDW BLD-RTO: 16.3 % (ref 11.7–14.9)
PLATELET # BLD: 147 K/CU MM (ref 140–440)
PMV BLD AUTO: 11 FL (ref 7.5–11.1)
POTASSIUM SERPL-SCNC: 4.8 MMOL/L (ref 3.5–5.1)
RBC # BLD: 2.91 M/CU MM (ref 4.6–6.2)
SODIUM BLD-SCNC: 138 MMOL/L (ref 135–145)
TOTAL IMMATURE NEUTOROPHIL: 0.04 K/CU MM
TOTAL NUCLEATED RBC: 0 K/CU MM
WBC # BLD: 6.1 K/CU MM (ref 4–10.5)

## 2024-06-11 PROCEDURE — 6370000000 HC RX 637 (ALT 250 FOR IP)

## 2024-06-11 PROCEDURE — 6370000000 HC RX 637 (ALT 250 FOR IP): Performed by: STUDENT IN AN ORGANIZED HEALTH CARE EDUCATION/TRAINING PROGRAM

## 2024-06-11 PROCEDURE — 36415 COLL VENOUS BLD VENIPUNCTURE: CPT

## 2024-06-11 PROCEDURE — 82962 GLUCOSE BLOOD TEST: CPT

## 2024-06-11 PROCEDURE — 86140 C-REACTIVE PROTEIN: CPT

## 2024-06-11 PROCEDURE — 2580000003 HC RX 258: Performed by: INTERNAL MEDICINE

## 2024-06-11 PROCEDURE — 99223 1ST HOSP IP/OBS HIGH 75: CPT | Performed by: INTERNAL MEDICINE

## 2024-06-11 PROCEDURE — 6360000002 HC RX W HCPCS: Performed by: INTERNAL MEDICINE

## 2024-06-11 PROCEDURE — 94761 N-INVAS EAR/PLS OXIMETRY MLT: CPT

## 2024-06-11 PROCEDURE — 85025 COMPLETE CBC W/AUTO DIFF WBC: CPT

## 2024-06-11 PROCEDURE — 80048 BASIC METABOLIC PNL TOTAL CA: CPT

## 2024-06-11 PROCEDURE — 80307 DRUG TEST PRSMV CHEM ANLYZR: CPT

## 2024-06-11 PROCEDURE — 90935 HEMODIALYSIS ONE EVALUATION: CPT

## 2024-06-11 PROCEDURE — 6370000000 HC RX 637 (ALT 250 FOR IP): Performed by: INTERNAL MEDICINE

## 2024-06-11 PROCEDURE — 2140000000 HC CCU INTERMEDIATE R&B

## 2024-06-11 PROCEDURE — 2700000000 HC OXYGEN THERAPY PER DAY

## 2024-06-11 RX ORDER — HEPARIN SODIUM 1000 [USP'U]/ML
3400 INJECTION, SOLUTION INTRAVENOUS; SUBCUTANEOUS
Status: COMPLETED | OUTPATIENT
Start: 2024-06-11 | End: 2024-06-11

## 2024-06-11 RX ADMIN — HYDRALAZINE HYDROCHLORIDE 25 MG: 25 TABLET ORAL at 20:24

## 2024-06-11 RX ADMIN — ATORVASTATIN CALCIUM 40 MG: 40 TABLET, FILM COATED ORAL at 20:24

## 2024-06-11 RX ADMIN — CLOPIDOGREL BISULFATE 75 MG: 75 TABLET ORAL at 13:05

## 2024-06-11 RX ADMIN — CALCIUM CARBONATE 1000 MG: 500 TABLET, CHEWABLE ORAL at 16:46

## 2024-06-11 RX ADMIN — Medication 5000 UNITS: at 15:09

## 2024-06-11 RX ADMIN — CALCIUM CARBONATE 1000 MG: 500 TABLET, CHEWABLE ORAL at 13:05

## 2024-06-11 RX ADMIN — SODIUM CHLORIDE, PRESERVATIVE FREE 10 ML: 5 INJECTION INTRAVENOUS at 20:25

## 2024-06-11 RX ADMIN — INSULIN GLARGINE 10 UNITS: 100 INJECTION, SOLUTION SUBCUTANEOUS at 20:26

## 2024-06-11 RX ADMIN — HEPARIN SODIUM 5000 UNITS: 5000 INJECTION INTRAVENOUS; SUBCUTANEOUS at 20:25

## 2024-06-11 RX ADMIN — CARVEDILOL 25 MG: 25 TABLET, FILM COATED ORAL at 16:46

## 2024-06-11 RX ADMIN — CALCITRIOL CAPSULES 0.25 MCG 0.5 MCG: 0.25 CAPSULE ORAL at 13:05

## 2024-06-11 RX ADMIN — AMIODARONE HYDROCHLORIDE 200 MG: 200 TABLET ORAL at 13:05

## 2024-06-11 RX ADMIN — HYDRALAZINE HYDROCHLORIDE 25 MG: 25 TABLET ORAL at 05:51

## 2024-06-11 RX ADMIN — HEPARIN SODIUM 5000 UNITS: 5000 INJECTION INTRAVENOUS; SUBCUTANEOUS at 15:09

## 2024-06-11 RX ADMIN — SODIUM CHLORIDE, PRESERVATIVE FREE 10 ML: 5 INJECTION INTRAVENOUS at 13:07

## 2024-06-11 RX ADMIN — METRONIDAZOLE 500 MG: 250 TABLET ORAL at 20:25

## 2024-06-11 RX ADMIN — CALCITRIOL CAPSULES 0.25 MCG 0.5 MCG: 0.25 CAPSULE ORAL at 20:24

## 2024-06-11 RX ADMIN — METRONIDAZOLE 500 MG: 250 TABLET ORAL at 13:05

## 2024-06-11 RX ADMIN — HEPARIN SODIUM 3400 UNITS: 1000 INJECTION INTRAVENOUS; SUBCUTANEOUS at 08:38

## 2024-06-11 RX ADMIN — OXYCODONE AND ACETAMINOPHEN 1 TABLET: 7.5; 325 TABLET ORAL at 16:46

## 2024-06-11 RX ADMIN — HEPARIN SODIUM 5000 UNITS: 5000 INJECTION INTRAVENOUS; SUBCUTANEOUS at 05:51

## 2024-06-11 RX ADMIN — ISOSORBIDE MONONITRATE 30 MG: 30 TABLET, EXTENDED RELEASE ORAL at 13:06

## 2024-06-11 RX ADMIN — OXYCODONE AND ACETAMINOPHEN 1 TABLET: 7.5; 325 TABLET ORAL at 07:36

## 2024-06-11 ASSESSMENT — PAIN - FUNCTIONAL ASSESSMENT
PAIN_FUNCTIONAL_ASSESSMENT: PREVENTS OR INTERFERES SOME ACTIVE ACTIVITIES AND ADLS
PAIN_FUNCTIONAL_ASSESSMENT: ACTIVITIES ARE NOT PREVENTED
PAIN_FUNCTIONAL_ASSESSMENT: ACTIVITIES ARE NOT PREVENTED

## 2024-06-11 ASSESSMENT — PAIN DESCRIPTION - DESCRIPTORS
DESCRIPTORS: SHARP
DESCRIPTORS: ACHING
DESCRIPTORS: ACHING;SHARP

## 2024-06-11 ASSESSMENT — PAIN DESCRIPTION - ORIENTATION
ORIENTATION: LEFT

## 2024-06-11 ASSESSMENT — PAIN SCALES - WONG BAKER: WONGBAKER_NUMERICALRESPONSE: NO HURT

## 2024-06-11 ASSESSMENT — PAIN DESCRIPTION - LOCATION
LOCATION: LEG;FOOT
LOCATION: FOOT
LOCATION: LEG;FOOT

## 2024-06-11 ASSESSMENT — PAIN DESCRIPTION - PAIN TYPE: TYPE: CHRONIC PAIN

## 2024-06-11 ASSESSMENT — PAIN DESCRIPTION - ONSET: ONSET: ON-GOING

## 2024-06-11 ASSESSMENT — PAIN SCALES - GENERAL
PAINLEVEL_OUTOF10: 6
PAINLEVEL_OUTOF10: 7
PAINLEVEL_OUTOF10: 8
PAINLEVEL_OUTOF10: 8

## 2024-06-11 ASSESSMENT — PAIN DESCRIPTION - FREQUENCY: FREQUENCY: CONTINUOUS

## 2024-06-11 NOTE — PLAN OF CARE
Problem: Discharge Planning  Goal: Discharge to home or other facility with appropriate resources  Outcome: Progressing     Problem: Safety - Adult  Goal: Free from fall injury  Outcome: Progressing     Problem: Respiratory - Adult  Goal: Achieves optimal ventilation and oxygenation  Outcome: Progressing     Problem: Musculoskeletal - Adult  Goal: Return mobility to safest level of function  Outcome: Progressing  Goal: Maintain proper alignment of affected body part  Outcome: Progressing  Goal: Return ADL status to a safe level of function  Outcome: Progressing     Problem: Metabolic/Fluid and Electrolytes - Adult  Goal: Electrolytes maintained within normal limits  Outcome: Progressing  Goal: Hemodynamic stability and optimal renal function maintained  Outcome: Progressing  Goal: Glucose maintained within prescribed range  Outcome: Progressing     Problem: Pain  Goal: Verbalizes/displays adequate comfort level or baseline comfort level  Outcome: Progressing     Problem: Skin/Tissue Integrity  Goal: Absence of new skin breakdown  Description: 1.  Monitor for areas of redness and/or skin breakdown  2.  Assess vascular access sites hourly  3.  Every 4-6 hours minimum:  Change oxygen saturation probe site  4.  Every 4-6 hours:  If on nasal continuous positive airway pressure, respiratory therapy assess nares and determine need for appliance change or resting period.  Outcome: Progressing     Problem: Chronic Conditions and Co-morbidities  Goal: Patient's chronic conditions and co-morbidity symptoms are monitored and maintained or improved  Outcome: Progressing

## 2024-06-11 NOTE — CARE COORDINATION
CM reviewed chart and met with patient at bedside.  Pt is very present, ESRD, hd at [facility] M,W,F, s/p Rt and Lt toes amputation, Lt foot osteomyelitis-d/c on 6/4 w/ [facility] wound VAC and home IV antibiotics.  Pt states he has no strength in his legs.  CM discussed SNF and pt is agreeable.  CM provided SNF list.  PT/OT ordered.  CM will con't to follow     06/11/24 1430   Service Assessment   Patient Orientation Alert and Oriented   Cognition Alert   History Provided By Patient   Primary Caregiver Self   Support Systems Children   Patient's Healthcare Decision Maker is: Legal Next of Kin   PCP Verified by CM Yes   Last Visit to PCP Within last 6 months   Current Functional Level Assistance with the following:;Mobility   Can patient return to prior living arrangement Unknown at present   Ability to make needs known: Good   Family able to assist with home care needs: No   Social/Functional History   Lives With Other (comment)  (roommate)   Type of Home House   Home Layout Multi-level  (bathroom on 2nd floor)   Home Access Stairs to enter with rails   Entrance Stairs - Number of Steps 4   Home Equipment Cane;Walker - Standard   Active  No   Patient's  Info insurance provides transport to medical appionments   Occupation Other(comment)   Type of Occupation Disability   Discharge Planning   Type of Residence Skilled Nursing Facility

## 2024-06-12 LAB
ANION GAP SERPL CALCULATED.3IONS-SCNC: 19 MMOL/L (ref 7–16)
BASOPHILS ABSOLUTE: 0.1 K/CU MM
BASOPHILS RELATIVE PERCENT: 1 % (ref 0–1)
BUN SERPL-MCNC: 81 MG/DL (ref 6–23)
CALCIUM SERPL-MCNC: 8.5 MG/DL (ref 8.3–10.6)
CHLORIDE BLD-SCNC: 96 MMOL/L (ref 99–110)
CO2: 21 MMOL/L (ref 21–32)
CREAT SERPL-MCNC: 7.6 MG/DL (ref 0.9–1.3)
CRP SERPL HS-MCNC: 13.1 MG/L
DIFFERENTIAL TYPE: ABNORMAL
DOSE AMOUNT: NORMAL
DOSE TIME: NORMAL
EOSINOPHILS ABSOLUTE: 0.2 K/CU MM
EOSINOPHILS RELATIVE PERCENT: 3.8 % (ref 0–3)
GFR, ESTIMATED: 8 ML/MIN/1.73M2
GLUCOSE BLD-MCNC: 139 MG/DL (ref 70–99)
GLUCOSE BLD-MCNC: 189 MG/DL (ref 70–99)
GLUCOSE BLD-MCNC: 234 MG/DL (ref 70–99)
GLUCOSE BLD-MCNC: 247 MG/DL (ref 70–99)
GLUCOSE SERPL-MCNC: 128 MG/DL (ref 70–99)
HCT VFR BLD CALC: 30.5 % (ref 42–52)
HEMOGLOBIN: 9.4 GM/DL (ref 13.5–18)
IMMATURE NEUTROPHIL %: 0.6 % (ref 0–0.43)
LYMPHOCYTES ABSOLUTE: 0.6 K/CU MM
LYMPHOCYTES RELATIVE PERCENT: 12.7 % (ref 24–44)
MCH RBC QN AUTO: 28.8 PG (ref 27–31)
MCHC RBC AUTO-ENTMCNC: 30.8 % (ref 32–36)
MCV RBC AUTO: 93.6 FL (ref 78–100)
MONOCYTES ABSOLUTE: 0.5 K/CU MM
MONOCYTES RELATIVE PERCENT: 9.1 % (ref 0–4)
NEUTROPHILS ABSOLUTE: 3.7 K/CU MM
NEUTROPHILS RELATIVE PERCENT: 72.8 % (ref 36–66)
NUCLEATED RBC %: 0 %
PDW BLD-RTO: 16.5 % (ref 11.7–14.9)
PLATELET # BLD: 161 K/CU MM (ref 140–440)
PMV BLD AUTO: 10.2 FL (ref 7.5–11.1)
POTASSIUM SERPL-SCNC: 5.3 MMOL/L (ref 3.5–5.1)
RBC # BLD: 3.26 M/CU MM (ref 4.6–6.2)
SODIUM BLD-SCNC: 136 MMOL/L (ref 135–145)
TOTAL IMMATURE NEUTOROPHIL: 0.03 K/CU MM
TOTAL NUCLEATED RBC: 0 K/CU MM
VANCOMYCIN RANDOM: 18 UG/ML
WBC # BLD: 5.1 K/CU MM (ref 4–10.5)

## 2024-06-12 PROCEDURE — 6370000000 HC RX 637 (ALT 250 FOR IP): Performed by: INTERNAL MEDICINE

## 2024-06-12 PROCEDURE — 36415 COLL VENOUS BLD VENIPUNCTURE: CPT

## 2024-06-12 PROCEDURE — 2580000003 HC RX 258: Performed by: INTERNAL MEDICINE

## 2024-06-12 PROCEDURE — 97530 THERAPEUTIC ACTIVITIES: CPT

## 2024-06-12 PROCEDURE — 90935 HEMODIALYSIS ONE EVALUATION: CPT

## 2024-06-12 PROCEDURE — 80048 BASIC METABOLIC PNL TOTAL CA: CPT

## 2024-06-12 PROCEDURE — 85025 COMPLETE CBC W/AUTO DIFF WBC: CPT

## 2024-06-12 PROCEDURE — 82962 GLUCOSE BLOOD TEST: CPT

## 2024-06-12 PROCEDURE — 97535 SELF CARE MNGMENT TRAINING: CPT

## 2024-06-12 PROCEDURE — 94761 N-INVAS EAR/PLS OXIMETRY MLT: CPT

## 2024-06-12 PROCEDURE — 87075 CULTR BACTERIA EXCEPT BLOOD: CPT

## 2024-06-12 PROCEDURE — 6360000002 HC RX W HCPCS: Performed by: INTERNAL MEDICINE

## 2024-06-12 PROCEDURE — 97162 PT EVAL MOD COMPLEX 30 MIN: CPT

## 2024-06-12 PROCEDURE — 97605 NEG PRS WND THER DME<=50SQCM: CPT

## 2024-06-12 PROCEDURE — 87186 SC STD MICRODIL/AGAR DIL: CPT

## 2024-06-12 PROCEDURE — 6370000000 HC RX 637 (ALT 250 FOR IP)

## 2024-06-12 PROCEDURE — 2140000000 HC CCU INTERMEDIATE R&B

## 2024-06-12 PROCEDURE — 80202 ASSAY OF VANCOMYCIN: CPT

## 2024-06-12 PROCEDURE — 97166 OT EVAL MOD COMPLEX 45 MIN: CPT

## 2024-06-12 PROCEDURE — 87070 CULTURE OTHR SPECIMN AEROBIC: CPT

## 2024-06-12 PROCEDURE — 86140 C-REACTIVE PROTEIN: CPT

## 2024-06-12 PROCEDURE — 6370000000 HC RX 637 (ALT 250 FOR IP): Performed by: STUDENT IN AN ORGANIZED HEALTH CARE EDUCATION/TRAINING PROGRAM

## 2024-06-12 PROCEDURE — 87077 CULTURE AEROBIC IDENTIFY: CPT

## 2024-06-12 RX ORDER — HEPARIN SODIUM 1000 [USP'U]/ML
3000 INJECTION, SOLUTION INTRAVENOUS; SUBCUTANEOUS
Status: COMPLETED | OUTPATIENT
Start: 2024-06-12 | End: 2024-06-12

## 2024-06-12 RX ADMIN — HEPARIN SODIUM 5000 UNITS: 5000 INJECTION INTRAVENOUS; SUBCUTANEOUS at 20:07

## 2024-06-12 RX ADMIN — CLOPIDOGREL BISULFATE 75 MG: 75 TABLET ORAL at 08:22

## 2024-06-12 RX ADMIN — Medication 5000 UNITS: at 08:21

## 2024-06-12 RX ADMIN — INSULIN GLARGINE 10 UNITS: 100 INJECTION, SOLUTION SUBCUTANEOUS at 21:29

## 2024-06-12 RX ADMIN — SODIUM CHLORIDE, PRESERVATIVE FREE 10 ML: 5 INJECTION INTRAVENOUS at 20:08

## 2024-06-12 RX ADMIN — METRONIDAZOLE 500 MG: 250 TABLET ORAL at 08:21

## 2024-06-12 RX ADMIN — ISOSORBIDE MONONITRATE 30 MG: 30 TABLET, EXTENDED RELEASE ORAL at 08:22

## 2024-06-12 RX ADMIN — SODIUM CHLORIDE, PRESERVATIVE FREE 10 ML: 5 INJECTION INTRAVENOUS at 08:31

## 2024-06-12 RX ADMIN — AMIODARONE HYDROCHLORIDE 200 MG: 200 TABLET ORAL at 08:21

## 2024-06-12 RX ADMIN — EPOETIN ALFA-EPBX 10000 UNITS: 10000 INJECTION, SOLUTION INTRAVENOUS; SUBCUTANEOUS at 13:23

## 2024-06-12 RX ADMIN — CALCITRIOL CAPSULES 0.25 MCG 0.5 MCG: 0.25 CAPSULE ORAL at 20:08

## 2024-06-12 RX ADMIN — HEPARIN SODIUM 3000 UNITS: 1000 INJECTION INTRAVENOUS; SUBCUTANEOUS at 13:03

## 2024-06-12 RX ADMIN — HYDRALAZINE HYDROCHLORIDE 25 MG: 25 TABLET ORAL at 20:07

## 2024-06-12 RX ADMIN — CARVEDILOL 25 MG: 25 TABLET, FILM COATED ORAL at 17:33

## 2024-06-12 RX ADMIN — OXYCODONE AND ACETAMINOPHEN 1 TABLET: 7.5; 325 TABLET ORAL at 09:31

## 2024-06-12 RX ADMIN — CARVEDILOL 25 MG: 25 TABLET, FILM COATED ORAL at 08:22

## 2024-06-12 RX ADMIN — CALCIUM CARBONATE 1000 MG: 500 TABLET, CHEWABLE ORAL at 08:20

## 2024-06-12 RX ADMIN — ATORVASTATIN CALCIUM 40 MG: 40 TABLET, FILM COATED ORAL at 20:07

## 2024-06-12 RX ADMIN — AMLODIPINE BESYLATE 5 MG: 5 TABLET ORAL at 08:20

## 2024-06-12 RX ADMIN — METRONIDAZOLE 500 MG: 250 TABLET ORAL at 17:32

## 2024-06-12 RX ADMIN — OXYCODONE AND ACETAMINOPHEN 1 TABLET: 7.5; 325 TABLET ORAL at 00:59

## 2024-06-12 RX ADMIN — CALCIUM CARBONATE 1000 MG: 500 TABLET, CHEWABLE ORAL at 17:32

## 2024-06-12 RX ADMIN — INSULIN LISPRO 2 UNITS: 100 INJECTION, SOLUTION INTRAVENOUS; SUBCUTANEOUS at 12:04

## 2024-06-12 RX ADMIN — HEPARIN SODIUM 5000 UNITS: 5000 INJECTION INTRAVENOUS; SUBCUTANEOUS at 05:10

## 2024-06-12 RX ADMIN — OXYCODONE AND ACETAMINOPHEN 1 TABLET: 7.5; 325 TABLET ORAL at 17:33

## 2024-06-12 RX ADMIN — CALCIUM CARBONATE 1000 MG: 500 TABLET, CHEWABLE ORAL at 12:04

## 2024-06-12 RX ADMIN — CALCITRIOL CAPSULES 0.25 MCG 0.5 MCG: 0.25 CAPSULE ORAL at 08:21

## 2024-06-12 ASSESSMENT — PAIN SCALES - GENERAL
PAINLEVEL_OUTOF10: 7
PAINLEVEL_OUTOF10: 8
PAINLEVEL_OUTOF10: 7
PAINLEVEL_OUTOF10: 7
PAINLEVEL_OUTOF10: 8

## 2024-06-12 ASSESSMENT — PAIN DESCRIPTION - PAIN TYPE: TYPE: CHRONIC PAIN

## 2024-06-12 ASSESSMENT — PAIN - FUNCTIONAL ASSESSMENT
PAIN_FUNCTIONAL_ASSESSMENT: PREVENTS OR INTERFERES SOME ACTIVE ACTIVITIES AND ADLS
PAIN_FUNCTIONAL_ASSESSMENT: ACTIVITIES ARE NOT PREVENTED

## 2024-06-12 ASSESSMENT — PAIN DESCRIPTION - LOCATION
LOCATION: LEG;FOOT
LOCATION: LEG
LOCATION: LEG;FOOT
LOCATION: FOOT;LEG
LOCATION: FOOT;LEG

## 2024-06-12 ASSESSMENT — PAIN DESCRIPTION - ORIENTATION
ORIENTATION: LEFT

## 2024-06-12 ASSESSMENT — PAIN DESCRIPTION - ONSET: ONSET: ON-GOING

## 2024-06-12 ASSESSMENT — PAIN DESCRIPTION - DESCRIPTORS
DESCRIPTORS: ACHING
DESCRIPTORS: SHARP
DESCRIPTORS: ACHING
DESCRIPTORS: SHARP

## 2024-06-12 ASSESSMENT — PAIN DESCRIPTION - FREQUENCY: FREQUENCY: CONTINUOUS

## 2024-06-12 ASSESSMENT — PAIN SCALES - WONG BAKER: WONGBAKER_NUMERICALRESPONSE: NO HURT

## 2024-06-13 ENCOUNTER — HOSPITAL ENCOUNTER (OUTPATIENT)
Dept: WOUND CARE | Age: 49
Discharge: HOME | End: 2024-06-13
Attending: STUDENT IN AN ORGANIZED HEALTH CARE EDUCATION/TRAINING PROGRAM

## 2024-06-13 LAB
ANION GAP SERPL CALCULATED.3IONS-SCNC: 13 MMOL/L (ref 7–16)
BASOPHILS ABSOLUTE: 0 K/CU MM
BASOPHILS RELATIVE PERCENT: 0.8 % (ref 0–1)
BUN SERPL-MCNC: 60 MG/DL (ref 6–23)
CALCIUM SERPL-MCNC: 9.3 MG/DL (ref 8.3–10.6)
CHLORIDE BLD-SCNC: 100 MMOL/L (ref 99–110)
CO2: 27 MMOL/L (ref 21–32)
CREAT SERPL-MCNC: 6.7 MG/DL (ref 0.9–1.3)
CRP SERPL HS-MCNC: 9.3 MG/L
DIFFERENTIAL TYPE: ABNORMAL
DOSE AMOUNT: NORMAL
DOSE TIME: NORMAL
EOSINOPHILS ABSOLUTE: 0.2 K/CU MM
EOSINOPHILS RELATIVE PERCENT: 3.6 % (ref 0–3)
GFR, ESTIMATED: 9 ML/MIN/1.73M2
GLUCOSE BLD-MCNC: 146 MG/DL (ref 70–99)
GLUCOSE BLD-MCNC: 244 MG/DL (ref 70–99)
GLUCOSE BLD-MCNC: 269 MG/DL (ref 70–99)
GLUCOSE SERPL-MCNC: 178 MG/DL (ref 70–99)
HCT VFR BLD CALC: 28.4 % (ref 42–52)
HEMOGLOBIN: 8.6 GM/DL (ref 13.5–18)
IMMATURE NEUTROPHIL %: 0.6 % (ref 0–0.43)
LYMPHOCYTES ABSOLUTE: 0.6 K/CU MM
LYMPHOCYTES RELATIVE PERCENT: 12.9 % (ref 24–44)
MCH RBC QN AUTO: 28.4 PG (ref 27–31)
MCHC RBC AUTO-ENTMCNC: 30.3 % (ref 32–36)
MCV RBC AUTO: 93.7 FL (ref 78–100)
MONOCYTES ABSOLUTE: 0.5 K/CU MM
MONOCYTES RELATIVE PERCENT: 10.5 % (ref 0–4)
NEUTROPHILS ABSOLUTE: 3.6 K/CU MM
NEUTROPHILS RELATIVE PERCENT: 71.6 % (ref 36–66)
NUCLEATED RBC %: 0 %
PDW BLD-RTO: 16.7 % (ref 11.7–14.9)
PLATELET # BLD: 153 K/CU MM (ref 140–440)
PMV BLD AUTO: 10.7 FL (ref 7.5–11.1)
POTASSIUM SERPL-SCNC: 4.9 MMOL/L (ref 3.5–5.1)
RBC # BLD: 3.03 M/CU MM (ref 4.6–6.2)
SODIUM BLD-SCNC: 140 MMOL/L (ref 135–145)
TOTAL IMMATURE NEUTOROPHIL: 0.03 K/CU MM
TOTAL NUCLEATED RBC: 0 K/CU MM
VANCOMYCIN RANDOM: 15.9 UG/ML
WBC # BLD: 5 K/CU MM (ref 4–10.5)

## 2024-06-13 PROCEDURE — 80048 BASIC METABOLIC PNL TOTAL CA: CPT

## 2024-06-13 PROCEDURE — 85025 COMPLETE CBC W/AUTO DIFF WBC: CPT

## 2024-06-13 PROCEDURE — 6360000002 HC RX W HCPCS: Performed by: INTERNAL MEDICINE

## 2024-06-13 PROCEDURE — 2580000003 HC RX 258: Performed by: INTERNAL MEDICINE

## 2024-06-13 PROCEDURE — 6370000000 HC RX 637 (ALT 250 FOR IP): Performed by: INTERNAL MEDICINE

## 2024-06-13 PROCEDURE — 99232 SBSQ HOSP IP/OBS MODERATE 35: CPT | Performed by: INTERNAL MEDICINE

## 2024-06-13 PROCEDURE — 82962 GLUCOSE BLOOD TEST: CPT

## 2024-06-13 PROCEDURE — 90935 HEMODIALYSIS ONE EVALUATION: CPT

## 2024-06-13 PROCEDURE — 6370000000 HC RX 637 (ALT 250 FOR IP): Performed by: STUDENT IN AN ORGANIZED HEALTH CARE EDUCATION/TRAINING PROGRAM

## 2024-06-13 PROCEDURE — 2700000000 HC OXYGEN THERAPY PER DAY

## 2024-06-13 PROCEDURE — 6370000000 HC RX 637 (ALT 250 FOR IP)

## 2024-06-13 PROCEDURE — 80202 ASSAY OF VANCOMYCIN: CPT

## 2024-06-13 PROCEDURE — 86140 C-REACTIVE PROTEIN: CPT

## 2024-06-13 PROCEDURE — 94761 N-INVAS EAR/PLS OXIMETRY MLT: CPT

## 2024-06-13 PROCEDURE — 2140000000 HC CCU INTERMEDIATE R&B

## 2024-06-13 RX ORDER — HEPARIN SODIUM 1000 [USP'U]/ML
3000 INJECTION, SOLUTION INTRAVENOUS; SUBCUTANEOUS
Status: COMPLETED | OUTPATIENT
Start: 2024-06-13 | End: 2024-06-13

## 2024-06-13 RX ADMIN — HEPARIN SODIUM 5000 UNITS: 5000 INJECTION INTRAVENOUS; SUBCUTANEOUS at 21:01

## 2024-06-13 RX ADMIN — CALCIUM CARBONATE 1000 MG: 500 TABLET, CHEWABLE ORAL at 17:51

## 2024-06-13 RX ADMIN — ISOSORBIDE MONONITRATE 30 MG: 30 TABLET, EXTENDED RELEASE ORAL at 12:13

## 2024-06-13 RX ADMIN — OXYCODONE AND ACETAMINOPHEN 1 TABLET: 7.5; 325 TABLET ORAL at 20:56

## 2024-06-13 RX ADMIN — CALCITRIOL CAPSULES 0.25 MCG 0.5 MCG: 0.25 CAPSULE ORAL at 12:14

## 2024-06-13 RX ADMIN — HEPARIN SODIUM 3000 UNITS: 1000 INJECTION INTRAVENOUS; SUBCUTANEOUS at 07:50

## 2024-06-13 RX ADMIN — SODIUM CHLORIDE, PRESERVATIVE FREE 10 ML: 5 INJECTION INTRAVENOUS at 12:19

## 2024-06-13 RX ADMIN — METRONIDAZOLE 500 MG: 250 TABLET ORAL at 00:40

## 2024-06-13 RX ADMIN — OXYCODONE AND ACETAMINOPHEN 1 TABLET: 7.5; 325 TABLET ORAL at 12:14

## 2024-06-13 RX ADMIN — ATORVASTATIN CALCIUM 40 MG: 40 TABLET, FILM COATED ORAL at 20:58

## 2024-06-13 RX ADMIN — INSULIN LISPRO 2 UNITS: 100 INJECTION, SOLUTION INTRAVENOUS; SUBCUTANEOUS at 17:53

## 2024-06-13 RX ADMIN — Medication 5000 UNITS: at 12:14

## 2024-06-13 RX ADMIN — AMLODIPINE BESYLATE 5 MG: 5 TABLET ORAL at 12:13

## 2024-06-13 RX ADMIN — HEPARIN SODIUM 5000 UNITS: 5000 INJECTION INTRAVENOUS; SUBCUTANEOUS at 05:09

## 2024-06-13 RX ADMIN — AMIODARONE HYDROCHLORIDE 200 MG: 200 TABLET ORAL at 12:14

## 2024-06-13 RX ADMIN — CLOPIDOGREL BISULFATE 75 MG: 75 TABLET ORAL at 12:14

## 2024-06-13 RX ADMIN — METRONIDAZOLE 500 MG: 250 TABLET ORAL at 17:51

## 2024-06-13 RX ADMIN — CARVEDILOL 25 MG: 25 TABLET, FILM COATED ORAL at 17:51

## 2024-06-13 RX ADMIN — CALCIUM CARBONATE 1000 MG: 500 TABLET, CHEWABLE ORAL at 12:14

## 2024-06-13 RX ADMIN — OXYCODONE AND ACETAMINOPHEN 1 TABLET: 7.5; 325 TABLET ORAL at 02:52

## 2024-06-13 RX ADMIN — SODIUM CHLORIDE, PRESERVATIVE FREE 10 ML: 5 INJECTION INTRAVENOUS at 21:00

## 2024-06-13 RX ADMIN — HYDRALAZINE HYDROCHLORIDE 25 MG: 25 TABLET ORAL at 21:00

## 2024-06-13 RX ADMIN — INSULIN GLARGINE 10 UNITS: 100 INJECTION, SOLUTION SUBCUTANEOUS at 20:58

## 2024-06-13 RX ADMIN — HEPARIN SODIUM 5000 UNITS: 5000 INJECTION INTRAVENOUS; SUBCUTANEOUS at 13:59

## 2024-06-13 RX ADMIN — HYDRALAZINE HYDROCHLORIDE 25 MG: 25 TABLET ORAL at 05:09

## 2024-06-13 RX ADMIN — METRONIDAZOLE 500 MG: 250 TABLET ORAL at 12:14

## 2024-06-13 RX ADMIN — VANCOMYCIN HYDROCHLORIDE 1000 MG: 1 INJECTION, POWDER, LYOPHILIZED, FOR SOLUTION INTRAVENOUS at 14:10

## 2024-06-13 RX ADMIN — EPOETIN ALFA-EPBX 10000 UNITS: 10000 INJECTION, SOLUTION INTRAVENOUS; SUBCUTANEOUS at 07:51

## 2024-06-13 RX ADMIN — CALCITRIOL CAPSULES 0.25 MCG 0.5 MCG: 0.25 CAPSULE ORAL at 20:58

## 2024-06-13 RX ADMIN — HYDRALAZINE HYDROCHLORIDE 25 MG: 25 TABLET ORAL at 13:59

## 2024-06-13 RX ADMIN — CARVEDILOL 25 MG: 25 TABLET, FILM COATED ORAL at 12:14

## 2024-06-13 ASSESSMENT — PAIN DESCRIPTION - LOCATION
LOCATION: FOOT

## 2024-06-13 ASSESSMENT — PAIN DESCRIPTION - ONSET
ONSET: ON-GOING
ONSET: ON-GOING

## 2024-06-13 ASSESSMENT — PAIN DESCRIPTION - ORIENTATION
ORIENTATION: LEFT

## 2024-06-13 ASSESSMENT — PAIN DESCRIPTION - FREQUENCY
FREQUENCY: CONTINUOUS
FREQUENCY: CONTINUOUS

## 2024-06-13 ASSESSMENT — PAIN SCALES - WONG BAKER
WONGBAKER_NUMERICALRESPONSE: NO HURT
WONGBAKER_NUMERICALRESPONSE: HURTS LITTLE MORE
WONGBAKER_NUMERICALRESPONSE: HURTS EVEN MORE
WONGBAKER_NUMERICALRESPONSE: NO HURT

## 2024-06-13 ASSESSMENT — PAIN DESCRIPTION - DESCRIPTORS
DESCRIPTORS: SHARP
DESCRIPTORS: ACHING
DESCRIPTORS: ACHING
DESCRIPTORS: THROBBING
DESCRIPTORS: ACHING
DESCRIPTORS: ACHING

## 2024-06-13 ASSESSMENT — PAIN SCALES - GENERAL
PAINLEVEL_OUTOF10: 8
PAINLEVEL_OUTOF10: 5
PAINLEVEL_OUTOF10: 4
PAINLEVEL_OUTOF10: 4
PAINLEVEL_OUTOF10: 10
PAINLEVEL_OUTOF10: 4
PAINLEVEL_OUTOF10: 6
PAINLEVEL_OUTOF10: 0

## 2024-06-13 ASSESSMENT — PAIN DESCRIPTION - PAIN TYPE
TYPE: CHRONIC PAIN

## 2024-06-13 NOTE — CARE COORDINATION
CM reviewed chart.  Spoke w/ pt by phone, discussed PT/OT recommended ARU.  Pt is agreeable.  CM called referral in [place] via voice message

## 2024-06-13 NOTE — CARE COORDINATION
Discussed pt in IDR, no beds available in ARU.  CM dicussed SNF with pt, first choice [facility], than [facility].

## 2024-06-14 LAB
ANION GAP SERPL CALCULATED.3IONS-SCNC: 15 MMOL/L (ref 7–16)
BASOPHILS ABSOLUTE: 0.1 K/CU MM
BASOPHILS RELATIVE PERCENT: 1.2 % (ref 0–1)
BUN SERPL-MCNC: 49 MG/DL (ref 6–23)
CALCIUM SERPL-MCNC: 9.5 MG/DL (ref 8.3–10.6)
CHLORIDE BLD-SCNC: 98 MMOL/L (ref 99–110)
CO2: 26 MMOL/L (ref 21–32)
CREAT SERPL-MCNC: 5.9 MG/DL (ref 0.9–1.3)
CRP SERPL HS-MCNC: 7.5 MG/L
CULTURE: ABNORMAL
CULTURE: ABNORMAL
DIFFERENTIAL TYPE: ABNORMAL
DOSE AMOUNT: NORMAL
DOSE TIME: NORMAL
EOSINOPHILS ABSOLUTE: 0.2 K/CU MM
EOSINOPHILS RELATIVE PERCENT: 4 % (ref 0–3)
GFR, ESTIMATED: 11 ML/MIN/1.73M2
GLUCOSE BLD-MCNC: 135 MG/DL (ref 70–99)
GLUCOSE BLD-MCNC: 147 MG/DL (ref 70–99)
GLUCOSE BLD-MCNC: 203 MG/DL (ref 70–99)
GLUCOSE SERPL-MCNC: 156 MG/DL (ref 70–99)
HCT VFR BLD CALC: 31.5 % (ref 42–52)
HEMOGLOBIN: 9.3 GM/DL (ref 13.5–18)
IMMATURE NEUTROPHIL %: 0.6 % (ref 0–0.43)
LYMPHOCYTES ABSOLUTE: 0.6 K/CU MM
LYMPHOCYTES RELATIVE PERCENT: 12.1 % (ref 24–44)
Lab: ABNORMAL
MCH RBC QN AUTO: 28.6 PG (ref 27–31)
MCHC RBC AUTO-ENTMCNC: 29.5 % (ref 32–36)
MCV RBC AUTO: 96.9 FL (ref 78–100)
MONOCYTES ABSOLUTE: 0.6 K/CU MM
MONOCYTES RELATIVE PERCENT: 11.7 % (ref 0–4)
NEUTROPHILS ABSOLUTE: 3.5 K/CU MM
NEUTROPHILS RELATIVE PERCENT: 70.4 % (ref 36–66)
NUCLEATED RBC %: 0 %
PDW BLD-RTO: 16.9 % (ref 11.7–14.9)
PLATELET # BLD: 157 K/CU MM (ref 140–440)
PMV BLD AUTO: 10.5 FL (ref 7.5–11.1)
POTASSIUM SERPL-SCNC: 5.1 MMOL/L (ref 3.5–5.1)
RBC # BLD: 3.25 M/CU MM (ref 4.6–6.2)
SODIUM BLD-SCNC: 139 MMOL/L (ref 135–145)
SPECIMEN: ABNORMAL
TOTAL IMMATURE NEUTOROPHIL: 0.03 K/CU MM
TOTAL NUCLEATED RBC: 0 K/CU MM
VANCOMYCIN RANDOM: 16.7 UG/ML
WBC # BLD: 5 K/CU MM (ref 4–10.5)

## 2024-06-14 PROCEDURE — 6370000000 HC RX 637 (ALT 250 FOR IP): Performed by: INTERNAL MEDICINE

## 2024-06-14 PROCEDURE — 6360000002 HC RX W HCPCS: Performed by: INTERNAL MEDICINE

## 2024-06-14 PROCEDURE — 86140 C-REACTIVE PROTEIN: CPT

## 2024-06-14 PROCEDURE — 90935 HEMODIALYSIS ONE EVALUATION: CPT

## 2024-06-14 PROCEDURE — 2140000000 HC CCU INTERMEDIATE R&B

## 2024-06-14 PROCEDURE — 36415 COLL VENOUS BLD VENIPUNCTURE: CPT

## 2024-06-14 PROCEDURE — 99232 SBSQ HOSP IP/OBS MODERATE 35: CPT | Performed by: INTERNAL MEDICINE

## 2024-06-14 PROCEDURE — 2580000003 HC RX 258: Performed by: INTERNAL MEDICINE

## 2024-06-14 PROCEDURE — 6370000000 HC RX 637 (ALT 250 FOR IP)

## 2024-06-14 PROCEDURE — 85025 COMPLETE CBC W/AUTO DIFF WBC: CPT

## 2024-06-14 PROCEDURE — 80048 BASIC METABOLIC PNL TOTAL CA: CPT

## 2024-06-14 PROCEDURE — 97605 NEG PRS WND THER DME<=50SQCM: CPT

## 2024-06-14 PROCEDURE — 82962 GLUCOSE BLOOD TEST: CPT

## 2024-06-14 PROCEDURE — 6370000000 HC RX 637 (ALT 250 FOR IP): Performed by: STUDENT IN AN ORGANIZED HEALTH CARE EDUCATION/TRAINING PROGRAM

## 2024-06-14 PROCEDURE — 94761 N-INVAS EAR/PLS OXIMETRY MLT: CPT

## 2024-06-14 PROCEDURE — 80202 ASSAY OF VANCOMYCIN: CPT

## 2024-06-14 RX ORDER — SULFAMETHOXAZOLE AND TRIMETHOPRIM 400; 80 MG/1; MG/1
1 TABLET ORAL 2 TIMES DAILY
Status: DISCONTINUED | OUTPATIENT
Start: 2024-06-14 | End: 2024-06-17 | Stop reason: HOSPADM

## 2024-06-14 RX ORDER — HEPARIN SODIUM 1000 [USP'U]/ML
3400 INJECTION, SOLUTION INTRAVENOUS; SUBCUTANEOUS
Status: COMPLETED | OUTPATIENT
Start: 2024-06-14 | End: 2024-06-14

## 2024-06-14 RX ADMIN — ATORVASTATIN CALCIUM 40 MG: 40 TABLET, FILM COATED ORAL at 20:23

## 2024-06-14 RX ADMIN — CALCIUM CARBONATE 1000 MG: 500 TABLET, CHEWABLE ORAL at 17:17

## 2024-06-14 RX ADMIN — HEPARIN SODIUM 5000 UNITS: 5000 INJECTION INTRAVENOUS; SUBCUTANEOUS at 14:50

## 2024-06-14 RX ADMIN — CARVEDILOL 25 MG: 25 TABLET, FILM COATED ORAL at 12:17

## 2024-06-14 RX ADMIN — AMIODARONE HYDROCHLORIDE 200 MG: 200 TABLET ORAL at 12:17

## 2024-06-14 RX ADMIN — CALCITRIOL CAPSULES 0.25 MCG 0.5 MCG: 0.25 CAPSULE ORAL at 12:17

## 2024-06-14 RX ADMIN — CARVEDILOL 25 MG: 25 TABLET, FILM COATED ORAL at 17:17

## 2024-06-14 RX ADMIN — INSULIN GLARGINE 10 UNITS: 100 INJECTION, SOLUTION SUBCUTANEOUS at 20:24

## 2024-06-14 RX ADMIN — Medication 5000 UNITS: at 12:17

## 2024-06-14 RX ADMIN — HEPARIN SODIUM 5000 UNITS: 5000 INJECTION INTRAVENOUS; SUBCUTANEOUS at 06:30

## 2024-06-14 RX ADMIN — SODIUM CHLORIDE, PRESERVATIVE FREE 10 ML: 5 INJECTION INTRAVENOUS at 20:24

## 2024-06-14 RX ADMIN — AMLODIPINE BESYLATE 5 MG: 5 TABLET ORAL at 12:18

## 2024-06-14 RX ADMIN — HEPARIN SODIUM 3400 UNITS: 1000 INJECTION INTRAVENOUS; SUBCUTANEOUS at 07:46

## 2024-06-14 RX ADMIN — OXYCODONE AND ACETAMINOPHEN 1 TABLET: 7.5; 325 TABLET ORAL at 12:17

## 2024-06-14 RX ADMIN — HYDRALAZINE HYDROCHLORIDE 25 MG: 25 TABLET ORAL at 20:23

## 2024-06-14 RX ADMIN — ONDANSETRON 4 MG: 2 INJECTION INTRAMUSCULAR; INTRAVENOUS at 07:46

## 2024-06-14 RX ADMIN — ISOSORBIDE MONONITRATE 30 MG: 30 TABLET, EXTENDED RELEASE ORAL at 12:17

## 2024-06-14 RX ADMIN — CALCIUM CARBONATE 1000 MG: 500 TABLET, CHEWABLE ORAL at 12:18

## 2024-06-14 RX ADMIN — METRONIDAZOLE 500 MG: 250 TABLET ORAL at 00:58

## 2024-06-14 RX ADMIN — ATORVASTATIN CALCIUM 40 MG: 40 TABLET, FILM COATED ORAL at 12:16

## 2024-06-14 RX ADMIN — OXYCODONE AND ACETAMINOPHEN 1 TABLET: 7.5; 325 TABLET ORAL at 20:23

## 2024-06-14 RX ADMIN — EPOETIN ALFA-EPBX 10000 UNITS: 10000 INJECTION, SOLUTION INTRAVENOUS; SUBCUTANEOUS at 07:46

## 2024-06-14 RX ADMIN — SULFAMETHOXAZOLE AND TRIMETHOPRIM 1 TABLET: 400; 80 TABLET ORAL at 14:50

## 2024-06-14 RX ADMIN — HEPARIN SODIUM 5000 UNITS: 5000 INJECTION INTRAVENOUS; SUBCUTANEOUS at 21:32

## 2024-06-14 RX ADMIN — HYDRALAZINE HYDROCHLORIDE 25 MG: 25 TABLET ORAL at 12:17

## 2024-06-14 RX ADMIN — CALCITRIOL CAPSULES 0.25 MCG 0.5 MCG: 0.25 CAPSULE ORAL at 20:23

## 2024-06-14 RX ADMIN — METRONIDAZOLE 500 MG: 250 TABLET ORAL at 17:16

## 2024-06-14 RX ADMIN — SULFAMETHOXAZOLE AND TRIMETHOPRIM 1 TABLET: 400; 80 TABLET ORAL at 20:26

## 2024-06-14 RX ADMIN — METRONIDAZOLE 500 MG: 250 TABLET ORAL at 12:17

## 2024-06-14 RX ADMIN — CLOPIDOGREL BISULFATE 75 MG: 75 TABLET ORAL at 12:17

## 2024-06-14 ASSESSMENT — PAIN DESCRIPTION - FREQUENCY: FREQUENCY: CONTINUOUS

## 2024-06-14 ASSESSMENT — PAIN DESCRIPTION - ORIENTATION: ORIENTATION: LEFT

## 2024-06-14 ASSESSMENT — PAIN DESCRIPTION - LOCATION: LOCATION: FOOT

## 2024-06-14 ASSESSMENT — PAIN SCALES - WONG BAKER
WONGBAKER_NUMERICALRESPONSE: HURTS LITTLE MORE
WONGBAKER_NUMERICALRESPONSE: HURTS EVEN MORE

## 2024-06-14 ASSESSMENT — PAIN DESCRIPTION - PAIN TYPE: TYPE: CHRONIC PAIN

## 2024-06-14 ASSESSMENT — PAIN SCALES - GENERAL
PAINLEVEL_OUTOF10: 6
PAINLEVEL_OUTOF10: 4
PAINLEVEL_OUTOF10: 2
PAINLEVEL_OUTOF10: 8
PAINLEVEL_OUTOF10: 8

## 2024-06-14 ASSESSMENT — PAIN DESCRIPTION - ONSET: ONSET: ON-GOING

## 2024-06-14 ASSESSMENT — PAIN DESCRIPTION - DESCRIPTORS: DESCRIPTORS: SHARP;ACHING

## 2024-06-14 ASSESSMENT — PAIN - FUNCTIONAL ASSESSMENT: PAIN_FUNCTIONAL_ASSESSMENT: ACTIVITIES ARE NOT PREVENTED

## 2024-06-14 NOTE — CARE COORDINATION
The authorization request [id number] has been submitted for [facility] [employer] [facility]. The authorization is pending for clinical review via [employer] portal.  1:45 PM

## 2024-06-14 NOTE — CARE COORDINATION
CM received call from [facility] in admission, can not confirm transport for [facility].  Pt is agreeable to [facility].  [facility] approved.  CM sent Team chat to Letha GILLILAND to begin percert.

## 2024-06-14 NOTE — CARE COORDINATION
No bed available on ARU at this time.  Discussed with CM.  Per CM sent referral to plan B.  Will remove patient from ARU list.

## 2024-06-14 NOTE — PLAN OF CARE
Problem: Discharge Planning  Goal: Discharge to home or other facility with appropriate resources  Outcome: Progressing  Flowsheets (Taken 6/14/2024 0304)  Discharge to home or other facility with appropriate resources:   Arrange for needed discharge resources and transportation as appropriate   Identify barriers to discharge with patient and caregiver     Problem: Safety - Adult  Goal: Free from fall injury  Outcome: Progressing     Problem: Respiratory - Adult  Goal: Achieves optimal ventilation and oxygenation  Outcome: Progressing  Flowsheets (Taken 6/14/2024 0304)  Achieves optimal ventilation and oxygenation:   Assess for changes in mentation and behavior   Assess for changes in respiratory status   Assess and instruct to report shortness of breath or any respiratory difficulty     Problem: Musculoskeletal - Adult  Goal: Return mobility to safest level of function  Outcome: Progressing  Goal: Maintain proper alignment of affected body part  Outcome: Progressing  Goal: Return ADL status to a safe level of function  Outcome: Progressing     Problem: Metabolic/Fluid and Electrolytes - Adult  Goal: Electrolytes maintained within normal limits  Outcome: Progressing  Flowsheets (Taken 6/14/2024 0304)  Electrolytes maintained within normal limits: Monitor labs and assess patient for signs and symptoms of electrolyte imbalances  Goal: Hemodynamic stability and optimal renal function maintained  Outcome: Progressing  Goal: Glucose maintained within prescribed range  Outcome: Progressing     Problem: Pain  Goal: Verbalizes/displays adequate comfort level or baseline comfort level  Outcome: Progressing  Flowsheets (Taken 6/14/2024 0304)  Verbalizes/displays adequate comfort level or baseline comfort level:   Administer analgesics based on type and severity of pain and evaluate response   Assess pain using appropriate pain scale   Encourage patient to monitor pain and request assistance     Problem: Skin/Tissue

## 2024-06-15 LAB
CRP SERPL HS-MCNC: 5.6 MG/L
GLUCOSE BLD-MCNC: 142 MG/DL (ref 70–99)
GLUCOSE BLD-MCNC: 144 MG/DL (ref 70–99)
GLUCOSE BLD-MCNC: 165 MG/DL (ref 70–99)
GLUCOSE BLD-MCNC: 232 MG/DL (ref 70–99)

## 2024-06-15 PROCEDURE — 2700000000 HC OXYGEN THERAPY PER DAY

## 2024-06-15 PROCEDURE — 6370000000 HC RX 637 (ALT 250 FOR IP): Performed by: INTERNAL MEDICINE

## 2024-06-15 PROCEDURE — 6370000000 HC RX 637 (ALT 250 FOR IP)

## 2024-06-15 PROCEDURE — 82962 GLUCOSE BLOOD TEST: CPT

## 2024-06-15 PROCEDURE — 6360000002 HC RX W HCPCS: Performed by: INTERNAL MEDICINE

## 2024-06-15 PROCEDURE — 94761 N-INVAS EAR/PLS OXIMETRY MLT: CPT

## 2024-06-15 PROCEDURE — 86140 C-REACTIVE PROTEIN: CPT

## 2024-06-15 PROCEDURE — 2140000000 HC CCU INTERMEDIATE R&B

## 2024-06-15 PROCEDURE — 2580000003 HC RX 258: Performed by: INTERNAL MEDICINE

## 2024-06-15 PROCEDURE — 90935 HEMODIALYSIS ONE EVALUATION: CPT

## 2024-06-15 PROCEDURE — 6370000000 HC RX 637 (ALT 250 FOR IP): Performed by: STUDENT IN AN ORGANIZED HEALTH CARE EDUCATION/TRAINING PROGRAM

## 2024-06-15 PROCEDURE — 36415 COLL VENOUS BLD VENIPUNCTURE: CPT

## 2024-06-15 RX ORDER — HEPARIN SODIUM 1000 [USP'U]/ML
2400 INJECTION, SOLUTION INTRAVENOUS; SUBCUTANEOUS
Status: COMPLETED | OUTPATIENT
Start: 2024-06-15 | End: 2024-06-15

## 2024-06-15 RX ADMIN — ISOSORBIDE MONONITRATE 30 MG: 30 TABLET, EXTENDED RELEASE ORAL at 12:55

## 2024-06-15 RX ADMIN — METRONIDAZOLE 500 MG: 250 TABLET ORAL at 00:25

## 2024-06-15 RX ADMIN — AMIODARONE HYDROCHLORIDE 200 MG: 200 TABLET ORAL at 12:55

## 2024-06-15 RX ADMIN — INSULIN GLARGINE 10 UNITS: 100 INJECTION, SOLUTION SUBCUTANEOUS at 21:50

## 2024-06-15 RX ADMIN — CLOPIDOGREL BISULFATE 75 MG: 75 TABLET ORAL at 12:55

## 2024-06-15 RX ADMIN — CALCITRIOL CAPSULES 0.25 MCG 0.5 MCG: 0.25 CAPSULE ORAL at 21:50

## 2024-06-15 RX ADMIN — HEPARIN SODIUM 2400 UNITS: 1000 INJECTION INTRAVENOUS; SUBCUTANEOUS at 08:45

## 2024-06-15 RX ADMIN — HEPARIN SODIUM 5000 UNITS: 5000 INJECTION INTRAVENOUS; SUBCUTANEOUS at 05:37

## 2024-06-15 RX ADMIN — SODIUM CHLORIDE, PRESERVATIVE FREE 10 ML: 5 INJECTION INTRAVENOUS at 12:56

## 2024-06-15 RX ADMIN — HEPARIN SODIUM 5000 UNITS: 5000 INJECTION INTRAVENOUS; SUBCUTANEOUS at 21:49

## 2024-06-15 RX ADMIN — ONDANSETRON 4 MG: 4 TABLET, ORALLY DISINTEGRATING ORAL at 06:15

## 2024-06-15 RX ADMIN — INSULIN LISPRO 2 UNITS: 100 INJECTION, SOLUTION INTRAVENOUS; SUBCUTANEOUS at 17:00

## 2024-06-15 RX ADMIN — ATORVASTATIN CALCIUM 40 MG: 40 TABLET, FILM COATED ORAL at 21:50

## 2024-06-15 RX ADMIN — AMLODIPINE BESYLATE 5 MG: 5 TABLET ORAL at 12:55

## 2024-06-15 RX ADMIN — HYDRALAZINE HYDROCHLORIDE 25 MG: 25 TABLET ORAL at 05:37

## 2024-06-15 RX ADMIN — CALCITRIOL CAPSULES 0.25 MCG 0.5 MCG: 0.25 CAPSULE ORAL at 12:55

## 2024-06-15 RX ADMIN — CARVEDILOL 25 MG: 25 TABLET, FILM COATED ORAL at 17:00

## 2024-06-15 RX ADMIN — HYDRALAZINE HYDROCHLORIDE 25 MG: 25 TABLET ORAL at 15:14

## 2024-06-15 RX ADMIN — METRONIDAZOLE 500 MG: 250 TABLET ORAL at 12:56

## 2024-06-15 RX ADMIN — SULFAMETHOXAZOLE AND TRIMETHOPRIM 1 TABLET: 400; 80 TABLET ORAL at 21:53

## 2024-06-15 RX ADMIN — OXYCODONE AND ACETAMINOPHEN 1 TABLET: 7.5; 325 TABLET ORAL at 21:50

## 2024-06-15 RX ADMIN — OXYCODONE AND ACETAMINOPHEN 1 TABLET: 7.5; 325 TABLET ORAL at 12:54

## 2024-06-15 RX ADMIN — SODIUM CHLORIDE, PRESERVATIVE FREE 10 ML: 5 INJECTION INTRAVENOUS at 21:50

## 2024-06-15 RX ADMIN — OXYCODONE AND ACETAMINOPHEN 1 TABLET: 7.5; 325 TABLET ORAL at 05:34

## 2024-06-15 RX ADMIN — CALCIUM CARBONATE 1000 MG: 500 TABLET, CHEWABLE ORAL at 12:55

## 2024-06-15 RX ADMIN — SULFAMETHOXAZOLE AND TRIMETHOPRIM 1 TABLET: 400; 80 TABLET ORAL at 12:56

## 2024-06-15 RX ADMIN — HEPARIN SODIUM 5000 UNITS: 5000 INJECTION INTRAVENOUS; SUBCUTANEOUS at 15:14

## 2024-06-15 RX ADMIN — Medication 5000 UNITS: at 12:55

## 2024-06-15 RX ADMIN — CARVEDILOL 25 MG: 25 TABLET, FILM COATED ORAL at 12:55

## 2024-06-15 RX ADMIN — HYDRALAZINE HYDROCHLORIDE 25 MG: 25 TABLET ORAL at 21:50

## 2024-06-15 ASSESSMENT — PAIN DESCRIPTION - ORIENTATION
ORIENTATION: LEFT

## 2024-06-15 ASSESSMENT — PAIN DESCRIPTION - DESCRIPTORS
DESCRIPTORS: SHARP;ACHING
DESCRIPTORS: SHARP
DESCRIPTORS: ACHING
DESCRIPTORS: ACHING;SHARP

## 2024-06-15 ASSESSMENT — PAIN SCALES - GENERAL
PAINLEVEL_OUTOF10: 7
PAINLEVEL_OUTOF10: 6
PAINLEVEL_OUTOF10: 8
PAINLEVEL_OUTOF10: 7
PAINLEVEL_OUTOF10: 8
PAINLEVEL_OUTOF10: 7
PAINLEVEL_OUTOF10: 4
PAINLEVEL_OUTOF10: 8
PAINLEVEL_OUTOF10: 4

## 2024-06-15 ASSESSMENT — PAIN DESCRIPTION - ONSET: ONSET: ON-GOING

## 2024-06-15 ASSESSMENT — PAIN SCALES - WONG BAKER
WONGBAKER_NUMERICALRESPONSE: HURTS WHOLE LOT
WONGBAKER_NUMERICALRESPONSE: HURTS WHOLE LOT
WONGBAKER_NUMERICALRESPONSE: HURTS LITTLE MORE
WONGBAKER_NUMERICALRESPONSE: HURTS EVEN MORE

## 2024-06-15 ASSESSMENT — PAIN DESCRIPTION - LOCATION
LOCATION: FOOT

## 2024-06-15 ASSESSMENT — PAIN - FUNCTIONAL ASSESSMENT
PAIN_FUNCTIONAL_ASSESSMENT: PREVENTS OR INTERFERES SOME ACTIVE ACTIVITIES AND ADLS
PAIN_FUNCTIONAL_ASSESSMENT: PREVENTS OR INTERFERES SOME ACTIVE ACTIVITIES AND ADLS
PAIN_FUNCTIONAL_ASSESSMENT: ACTIVITIES ARE NOT PREVENTED

## 2024-06-15 ASSESSMENT — PAIN DESCRIPTION - FREQUENCY: FREQUENCY: CONTINUOUS

## 2024-06-15 ASSESSMENT — PAIN DESCRIPTION - PAIN TYPE: TYPE: CHRONIC PAIN;ACUTE PAIN

## 2024-06-15 NOTE — PLAN OF CARE
Problem: Discharge Planning  Goal: Discharge to home or other facility with appropriate resources  Outcome: Progressing  Flowsheets (Taken 6/14/2024 0304)  Discharge to home or other facility with appropriate resources:   Arrange for needed discharge resources and transportation as appropriate   Identify barriers to discharge with patient and caregiver     Problem: Safety - Adult  Goal: Free from fall injury  Outcome: Progressing     Problem: Respiratory - Adult  Goal: Achieves optimal ventilation and oxygenation  Outcome: Progressing  Flowsheets (Taken 6/14/2024 2113)  Achieves optimal ventilation and oxygenation:   Assess for changes in mentation and behavior   Assess for changes in respiratory status   Oxygen supplementation based on oxygen saturation or arterial blood gases     Problem: Musculoskeletal - Adult  Goal: Return mobility to safest level of function  Outcome: Progressing  Goal: Maintain proper alignment of affected body part  Outcome: Progressing  Goal: Return ADL status to a safe level of function  Outcome: Progressing     Problem: Metabolic/Fluid and Electrolytes - Adult  Goal: Electrolytes maintained within normal limits  Outcome: Progressing  Flowsheets (Taken 6/14/2024 2113)  Electrolytes maintained within normal limits:   Monitor labs and assess patient for signs and symptoms of electrolyte imbalances   Administer electrolyte replacement as ordered   Monitor response to electrolyte replacements, including repeat lab results as appropriate   Fluid restriction as ordered  Goal: Hemodynamic stability and optimal renal function maintained  Outcome: Progressing  Goal: Glucose maintained within prescribed range  Outcome: Progressing     Problem: Pain  Goal: Verbalizes/displays adequate comfort level or baseline comfort level  Outcome: Progressing     Problem: Skin/Tissue Integrity  Goal: Absence of new skin breakdown  Description: 1.  Monitor for areas of redness and/or skin breakdown  2.  Assess

## 2024-06-15 NOTE — DIALYSIS
06/14/24 0258   WBC 5.0 5.0   HGB 8.6* 9.3*   HCT 28.4* 31.5*    157                                                                  Recent Labs     06/13/24  0414 06/14/24 0258    139   K 4.9 5.1    98*   CO2 27 26   BUN 60* 49*   CREATININE 6.7* 5.9*   GLUCOSE 178* 156*     IV Drips and Rate/Dose   sodium chloride      dextrose        Safety - Before each treatment:   Dialysis Machine No.: [id number]  RO Machine Number: [id number]  Dialyzer Lot No.: [id number]  RO Machine Log Sheet Completed: Yes  Machine Alarm Self Test: Completed;Passed (06/15/24 0726)     Air Foam Detector: Tested, Proper Function, pH Reading  Extracorporeal Circuit Tested for Integrity: Yes  Machine Conductivity: 13.8  Manual Conductivity: 14  Machine Ph: 7.2  Bicarbonate Concentrate Lot No.: [id number]  Acid Concentrate Lot No.: [id number]  Manual Ph: 7.4  Bleach Test (Neg): Yes  Bath Temperature: 95 °F (35 °C)  Tubing Lot#: [id number]  Conductivity Meter Serial #: [id number]  All Connections Secure?: Yes  Venous Parameters Set?: Yes  Arterial Parameters Set?: Yes  Saline Line Double Clamped?: Yes  Air Foam Detector Engaged?: Yes  Machine Functioning Alarm Free? Yes  Prime Given: 200ml    Chlorine Testing - Before each treatment and every 4 hours:   Treatment  Treatment Number: 2  Time On: 0845  Time Off: 1145  Treatment Goal: 3.5 HOUR, 4.0L  Weight - Scale: 125.5 kg (276 lb 10.8 oz) (06/12/24 0434)  1st check: less than 0.1 ppm at: 0700 hours  2nd check: less than 0.1 ppm at: 1040 hours  3rd check: Not Applicable  (if greater than 0.1 ppm, then check every 30 minutes from secondary)    Access Flows and Pressures  Patient Vitals for the past 8 hrs:   Blood Flow Rate (ml/min) Ultrafiltration Rate (ml/hr) Ultrafiltration Removed (ml) Arterial Pressure (mmHg) Venous Pressure (mmHg) TMP DFR Comments Access Visible   06/15/24 0845 350 ml/min 840 ml/hr -- -80 mmHg 150 40 700 tx initiated Yes   06/15/24 0900 350 ml/min 840 ml/hr 223 ml -80 mmHg 150

## 2024-06-16 LAB
GLUCOSE BLD-MCNC: 122 MG/DL (ref 70–99)
GLUCOSE BLD-MCNC: 187 MG/DL (ref 70–99)
GLUCOSE BLD-MCNC: 190 MG/DL (ref 70–99)
GLUCOSE BLD-MCNC: 198 MG/DL (ref 70–99)
GLUCOSE BLD-MCNC: 212 MG/DL (ref 70–99)

## 2024-06-16 PROCEDURE — 97116 GAIT TRAINING THERAPY: CPT

## 2024-06-16 PROCEDURE — 97110 THERAPEUTIC EXERCISES: CPT

## 2024-06-16 PROCEDURE — 2580000003 HC RX 258: Performed by: INTERNAL MEDICINE

## 2024-06-16 PROCEDURE — 6370000000 HC RX 637 (ALT 250 FOR IP): Performed by: INTERNAL MEDICINE

## 2024-06-16 PROCEDURE — 94761 N-INVAS EAR/PLS OXIMETRY MLT: CPT

## 2024-06-16 PROCEDURE — 82962 GLUCOSE BLOOD TEST: CPT

## 2024-06-16 PROCEDURE — 6360000002 HC RX W HCPCS: Performed by: INTERNAL MEDICINE

## 2024-06-16 PROCEDURE — 2140000000 HC CCU INTERMEDIATE R&B

## 2024-06-16 PROCEDURE — 6370000000 HC RX 637 (ALT 250 FOR IP)

## 2024-06-16 PROCEDURE — 2700000000 HC OXYGEN THERAPY PER DAY

## 2024-06-16 PROCEDURE — 6370000000 HC RX 637 (ALT 250 FOR IP): Performed by: STUDENT IN AN ORGANIZED HEALTH CARE EDUCATION/TRAINING PROGRAM

## 2024-06-16 RX ADMIN — CARVEDILOL 25 MG: 25 TABLET, FILM COATED ORAL at 08:21

## 2024-06-16 RX ADMIN — CALCITRIOL CAPSULES 0.25 MCG 0.5 MCG: 0.25 CAPSULE ORAL at 20:59

## 2024-06-16 RX ADMIN — OXYCODONE AND ACETAMINOPHEN 1 TABLET: 7.5; 325 TABLET ORAL at 16:08

## 2024-06-16 RX ADMIN — CALCIUM CARBONATE 1000 MG: 500 TABLET, CHEWABLE ORAL at 08:21

## 2024-06-16 RX ADMIN — METRONIDAZOLE 500 MG: 250 TABLET ORAL at 08:22

## 2024-06-16 RX ADMIN — METRONIDAZOLE 500 MG: 250 TABLET ORAL at 01:07

## 2024-06-16 RX ADMIN — HYDRALAZINE HYDROCHLORIDE 25 MG: 25 TABLET ORAL at 05:34

## 2024-06-16 RX ADMIN — CARVEDILOL 25 MG: 25 TABLET, FILM COATED ORAL at 16:08

## 2024-06-16 RX ADMIN — METRONIDAZOLE 500 MG: 250 TABLET ORAL at 16:08

## 2024-06-16 RX ADMIN — CLOPIDOGREL BISULFATE 75 MG: 75 TABLET ORAL at 08:21

## 2024-06-16 RX ADMIN — HYDRALAZINE HYDROCHLORIDE 25 MG: 25 TABLET ORAL at 13:22

## 2024-06-16 RX ADMIN — CALCIUM CARBONATE 1000 MG: 500 TABLET, CHEWABLE ORAL at 16:08

## 2024-06-16 RX ADMIN — SULFAMETHOXAZOLE AND TRIMETHOPRIM 1 TABLET: 400; 80 TABLET ORAL at 21:01

## 2024-06-16 RX ADMIN — HEPARIN SODIUM 5000 UNITS: 5000 INJECTION INTRAVENOUS; SUBCUTANEOUS at 13:22

## 2024-06-16 RX ADMIN — SODIUM CHLORIDE, PRESERVATIVE FREE 10 ML: 5 INJECTION INTRAVENOUS at 21:00

## 2024-06-16 RX ADMIN — OXYCODONE AND ACETAMINOPHEN 1 TABLET: 7.5; 325 TABLET ORAL at 05:34

## 2024-06-16 RX ADMIN — INSULIN GLARGINE 10 UNITS: 100 INJECTION, SOLUTION SUBCUTANEOUS at 20:59

## 2024-06-16 RX ADMIN — HEPARIN SODIUM 5000 UNITS: 5000 INJECTION INTRAVENOUS; SUBCUTANEOUS at 20:59

## 2024-06-16 RX ADMIN — CALCITRIOL CAPSULES 0.25 MCG 0.5 MCG: 0.25 CAPSULE ORAL at 08:21

## 2024-06-16 RX ADMIN — CALCIUM CARBONATE 1000 MG: 500 TABLET, CHEWABLE ORAL at 13:21

## 2024-06-16 RX ADMIN — AMLODIPINE BESYLATE 5 MG: 5 TABLET ORAL at 08:21

## 2024-06-16 RX ADMIN — ATORVASTATIN CALCIUM 40 MG: 40 TABLET, FILM COATED ORAL at 20:59

## 2024-06-16 RX ADMIN — ISOSORBIDE MONONITRATE 30 MG: 30 TABLET, EXTENDED RELEASE ORAL at 08:21

## 2024-06-16 RX ADMIN — AMIODARONE HYDROCHLORIDE 200 MG: 200 TABLET ORAL at 08:21

## 2024-06-16 RX ADMIN — HYDRALAZINE HYDROCHLORIDE 25 MG: 25 TABLET ORAL at 20:59

## 2024-06-16 RX ADMIN — HEPARIN SODIUM 5000 UNITS: 5000 INJECTION INTRAVENOUS; SUBCUTANEOUS at 05:34

## 2024-06-16 RX ADMIN — Medication 5000 UNITS: at 08:22

## 2024-06-16 RX ADMIN — SULFAMETHOXAZOLE AND TRIMETHOPRIM 1 TABLET: 400; 80 TABLET ORAL at 08:22

## 2024-06-16 ASSESSMENT — PAIN SCALES - GENERAL
PAINLEVEL_OUTOF10: 7
PAINLEVEL_OUTOF10: 3

## 2024-06-16 ASSESSMENT — PAIN DESCRIPTION - DESCRIPTORS
DESCRIPTORS: STABBING
DESCRIPTORS: ACHING
DESCRIPTORS: ACHING

## 2024-06-16 ASSESSMENT — PAIN DESCRIPTION - ORIENTATION
ORIENTATION: LEFT

## 2024-06-16 ASSESSMENT — PAIN DESCRIPTION - LOCATION
LOCATION: LEG;FOOT
LOCATION: LEG
LOCATION: FOOT

## 2024-06-16 ASSESSMENT — PAIN SCALES - WONG BAKER
WONGBAKER_NUMERICALRESPONSE: HURTS LITTLE MORE
WONGBAKER_NUMERICALRESPONSE: NO HURT

## 2024-06-16 ASSESSMENT — PAIN - FUNCTIONAL ASSESSMENT: PAIN_FUNCTIONAL_ASSESSMENT: ACTIVITIES ARE NOT PREVENTED

## 2024-06-17 VITALS
DIASTOLIC BLOOD PRESSURE: 94 MMHG | HEIGHT: 69 IN | RESPIRATION RATE: 16 BRPM | SYSTOLIC BLOOD PRESSURE: 172 MMHG | BODY MASS INDEX: 40.98 KG/M2 | WEIGHT: 276.68 LBS | TEMPERATURE: 98 F | HEART RATE: 69 BPM | OXYGEN SATURATION: 97 %

## 2024-06-17 PROBLEM — I47.29 VENTRICULAR TACHYCARDIA (PAROXYSMAL) (HCC): Status: RESOLVED | Noted: 2024-06-11 | Resolved: 2024-06-17

## 2024-06-17 PROBLEM — E87.70 FLUID OVERLOAD: Status: RESOLVED | Noted: 2023-02-07 | Resolved: 2024-06-17

## 2024-06-17 PROBLEM — I47.20 VENTRICULAR TACHYCARDIA (PAROXYSMAL): Status: RESOLVED | Noted: 2024-06-11 | Resolved: 2024-06-17

## 2024-06-17 LAB — GLUCOSE BLD-MCNC: 113 MG/DL (ref 70–99)

## 2024-06-17 PROCEDURE — 6370000000 HC RX 637 (ALT 250 FOR IP): Performed by: INTERNAL MEDICINE

## 2024-06-17 PROCEDURE — 99231 SBSQ HOSP IP/OBS SF/LOW 25: CPT | Performed by: INTERNAL MEDICINE

## 2024-06-17 PROCEDURE — 6360000002 HC RX W HCPCS: Performed by: INTERNAL MEDICINE

## 2024-06-17 PROCEDURE — 82962 GLUCOSE BLOOD TEST: CPT

## 2024-06-17 PROCEDURE — 6370000000 HC RX 637 (ALT 250 FOR IP)

## 2024-06-17 PROCEDURE — 97605 NEG PRS WND THER DME<=50SQCM: CPT

## 2024-06-17 PROCEDURE — 90935 HEMODIALYSIS ONE EVALUATION: CPT

## 2024-06-17 RX ORDER — ISOSORBIDE MONONITRATE 30 MG/1
30 TABLET, EXTENDED RELEASE ORAL DAILY
Qty: 30 TABLET | Refills: 3 | Status: SHIPPED | OUTPATIENT
Start: 2024-06-18

## 2024-06-17 RX ORDER — AMIODARONE HYDROCHLORIDE 200 MG/1
200 TABLET ORAL DAILY
Qty: 30 TABLET | Refills: 1 | Status: SHIPPED | OUTPATIENT
Start: 2024-06-18

## 2024-06-17 RX ORDER — CLOPIDOGREL BISULFATE 75 MG/1
75 TABLET ORAL DAILY
Qty: 30 TABLET | Refills: 3 | Status: SHIPPED | OUTPATIENT
Start: 2024-06-18

## 2024-06-17 RX ORDER — SULFAMETHOXAZOLE AND TRIMETHOPRIM 400; 80 MG/1; MG/1
1 TABLET ORAL 2 TIMES DAILY
Qty: 49 TABLET | Refills: 0 | Status: SHIPPED | OUTPATIENT
Start: 2024-06-17 | End: 2024-07-12

## 2024-06-17 RX ORDER — OXYCODONE AND ACETAMINOPHEN 7.5; 325 MG/1; MG/1
1 TABLET ORAL EVERY 8 HOURS PRN
Qty: 9 TABLET | Refills: 0 | Status: SHIPPED | OUTPATIENT
Start: 2024-06-17 | End: 2024-06-20

## 2024-06-17 RX ORDER — HEPARIN SODIUM 1000 [USP'U]/ML
3400 INJECTION, SOLUTION INTRAVENOUS; SUBCUTANEOUS
Status: COMPLETED | OUTPATIENT
Start: 2024-06-17 | End: 2024-06-17

## 2024-06-17 RX ADMIN — CLOPIDOGREL BISULFATE 75 MG: 75 TABLET ORAL at 13:01

## 2024-06-17 RX ADMIN — EPOETIN ALFA-EPBX 10000 UNITS: 10000 INJECTION, SOLUTION INTRAVENOUS; SUBCUTANEOUS at 08:26

## 2024-06-17 RX ADMIN — OXYCODONE AND ACETAMINOPHEN 1 TABLET: 7.5; 325 TABLET ORAL at 15:46

## 2024-06-17 RX ADMIN — CALCITRIOL CAPSULES 0.25 MCG 0.5 MCG: 0.25 CAPSULE ORAL at 13:00

## 2024-06-17 RX ADMIN — OXYCODONE AND ACETAMINOPHEN 1 TABLET: 7.5; 325 TABLET ORAL at 00:26

## 2024-06-17 RX ADMIN — HEPARIN SODIUM 3400 UNITS: 1000 INJECTION INTRAVENOUS; SUBCUTANEOUS at 08:26

## 2024-06-17 RX ADMIN — HEPARIN SODIUM 5000 UNITS: 5000 INJECTION INTRAVENOUS; SUBCUTANEOUS at 13:01

## 2024-06-17 RX ADMIN — HYDRALAZINE HYDROCHLORIDE 25 MG: 25 TABLET ORAL at 13:00

## 2024-06-17 RX ADMIN — SULFAMETHOXAZOLE AND TRIMETHOPRIM 1 TABLET: 400; 80 TABLET ORAL at 13:07

## 2024-06-17 RX ADMIN — AMIODARONE HYDROCHLORIDE 200 MG: 200 TABLET ORAL at 13:01

## 2024-06-17 RX ADMIN — ISOSORBIDE MONONITRATE 30 MG: 30 TABLET, EXTENDED RELEASE ORAL at 13:00

## 2024-06-17 RX ADMIN — METRONIDAZOLE 500 MG: 250 TABLET ORAL at 13:00

## 2024-06-17 RX ADMIN — OXYCODONE AND ACETAMINOPHEN 1 TABLET: 7.5; 325 TABLET ORAL at 07:36

## 2024-06-17 RX ADMIN — METRONIDAZOLE 500 MG: 250 TABLET ORAL at 00:26

## 2024-06-17 RX ADMIN — AMLODIPINE BESYLATE 5 MG: 5 TABLET ORAL at 13:01

## 2024-06-17 RX ADMIN — HYDRALAZINE HYDROCHLORIDE 25 MG: 25 TABLET ORAL at 04:30

## 2024-06-17 RX ADMIN — CARVEDILOL 25 MG: 25 TABLET, FILM COATED ORAL at 13:01

## 2024-06-17 RX ADMIN — HEPARIN SODIUM 5000 UNITS: 5000 INJECTION INTRAVENOUS; SUBCUTANEOUS at 04:30

## 2024-06-17 RX ADMIN — Medication 5000 UNITS: at 12:59

## 2024-06-17 ASSESSMENT — PAIN SCALES - WONG BAKER
WONGBAKER_NUMERICALRESPONSE: NO HURT
WONGBAKER_NUMERICALRESPONSE: NO HURT

## 2024-06-17 ASSESSMENT — PAIN DESCRIPTION - FREQUENCY: FREQUENCY: CONTINUOUS

## 2024-06-17 ASSESSMENT — PAIN SCALES - GENERAL
PAINLEVEL_OUTOF10: 8
PAINLEVEL_OUTOF10: 0
PAINLEVEL_OUTOF10: 7
PAINLEVEL_OUTOF10: 0

## 2024-06-17 ASSESSMENT — PAIN DESCRIPTION - ORIENTATION: ORIENTATION: LEFT

## 2024-06-17 ASSESSMENT — PAIN DESCRIPTION - LOCATION
LOCATION: FOOT
LOCATION: FOOT

## 2024-06-17 ASSESSMENT — PAIN DESCRIPTION - DESCRIPTORS
DESCRIPTORS: ACHING
DESCRIPTORS: ACHING;DISCOMFORT

## 2024-06-17 ASSESSMENT — PAIN - FUNCTIONAL ASSESSMENT: PAIN_FUNCTIONAL_ASSESSMENT: PREVENTS OR INTERFERES SOME ACTIVE ACTIVITIES AND ADLS

## 2024-06-17 ASSESSMENT — PAIN DESCRIPTION - ONSET
ONSET: ON-GOING
ONSET: ON-GOING

## 2024-06-17 NOTE — CARE COORDINATION
Precert pending for [facility] at this time.      [facility] updated     12:29 PM   CM updated [facility] of approval, they can accept Pt today if medically ready.    PS to Dr. David to update     2:31 PM   Pt on discharge.  Cm set stretcher transportation with [facility] for 1600    Pt RBOIN Acosta updated  Pt updated  [facility] updated

## 2024-06-17 NOTE — DISCHARGE SUMMARY
Where to Get Your Medications        These medications were sent to [facility], [place] - [address] [place] [address] Drive - P [phone] - F [phone]  [address] [facility] [address]      Phone: [phone]   amiodarone 200 MG tablet  clopidogrel 75 MG tablet  isosorbide mononitrate 30 MG extended release tablet  sulfamethoxazole-trimethoprim 400-80 MG per tablet       You can get these medications from any pharmacy    Bring a paper prescription for each of these medications  oxyCODONE-acetaminophen 7.5-325 MG per tablet        Objective Findings at Discharge:   BP (!) 172/94   Pulse 69   Temp 98 °F (36.7 °C) (Oral)   Resp 15   Ht 1.753 m (5' 9\")   Wt 125.5 kg (276 lb 10.8 oz)   SpO2 97%   BMI 40.86 kg/m²       Physical Exam:   General: NAD  Eyes: EOMI  ENT: neck supple  Cardiovascular: Regular rate.  Respiratory: Clear to auscultation  Gastrointestinal: Soft, non tender  Genitourinary: no suprapubic tenderness  Musculoskeletal: No edema, Wound vac on L foot  Skin: warm, dry  Neuro: Alert.  Psych: Mood appropriate.         Labs and Imaging   No results found.    CBC: No results for input(s): \"WBC\", \"HGB\", \"PLT\" in the last 72 hours.  BMP:  No results for input(s): \"NA\", \"K\", \"CL\", \"CO2\", \"BUN\", \"CREATININE\", \"GLUCOSE\" in the last 72 hours.  Hepatic: No results for input(s): \"AST\", \"ALT\", \"BILITOT\", \"ALKPHOS\" in the last 72 hours.    Invalid input(s): \"ALB\"  Lipids:   Lab Results   Component Value Date/Time    CHOL 166 09/22/2022 02:02 PM    CHOL 168 10/15/2013 10:30 AM    HDL 30 09/22/2022 02:02 PM    TRIG 254 09/22/2022 02:02 PM     Hemoglobin A1C:   Lab Results   Component Value Date/Time    LABA1C 5.5 10/11/2023 05:42 PM     TSH: No results found for: \"TSH\"  Troponin:   Lab Results   Component Value Date/Time    TROPONINT 0.176 02/08/2023 05:45 AM    TROPONINT 0.183 02/07/2023 09:17 PM    TROPONINT 0.155 09/13/2022 11:49 AM     Lactic Acid: No results for

## 2024-06-17 NOTE — CONSULTS
[address] [place] [address], [address]  [place] [address]  Phone: [phone]  Office Hours: 8:30AM - 4:30PM  Monday - Friday     Nephrology Service Consultation    Patient:  Yovanny Levine  MRN: [id number]  Consulting physician:  Anurag Mcarthur,*  Reason for Consult: ESKD fluid overload    History Obtained From:  patient, electronic medical record  PCP: José Luis Izquierdo MD    Assessment and Recommendations     Patient Active Problem List   Diagnosis Code    Hiatal hernia K44.9    Diabetes mellitus type 2, improved controlled [id number]    Diabetic neuropathy ([facility]) E11.40    Panic attack F41.0    Anxiety associated with depression F41.8    Neck pain M54.2    DANIELA (obstructive sleep apnea) G47.33    Urinary frequency R35.0    Bilateral carpal tunnel syndrome- left worse than right G56.03    Hyperlipidemia with target LDL less than 100 E78.5    Essential hypertension I10    Bronchitis J40    Osteomyelitis ([facility]) M86.9    Abscess L02.91    Periumbilical hernia K42.9    Sepsis ([facility]) A41.9    Cellulitis of left foot L03.116    Constipation K59.00    Intractable nausea and vomiting R11.2    Uncontrolled type 2 diabetes mellitus [id number]    Nausea and vomiting R11.2    Diabetic foot infection ([facility]) E11.628, L08.9    Acute renal failure (ARF) (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) F33.42    Generalized anxiety disorder F41.1    Morbid obesity with body mass index (BMI) of 40.0 to 44.9 in adult ([facility]) E66.01, Z68.41    Necrotizing fasciitis ([facility]) M72.6    Syncope R55    Right groin wound S31.109A    Ulcer of right groin with fat layer exposed ([facility]) L98.492    Diabetic ulcer of left midfoot associated with type 2 diabetes mellitus ([facility]) E11.621, L97.429    Nephrotic syndrome N04.9    Generalized edema R60.1    Stage 3b chronic kidney disease 
 [facility]  Consult Note       Yovanny Levine  AGE: 49 y.o.   GENDER: male  : 1975  TODAY'S DATE:  2024    Subjective:     Reason for C Evaluation and Assessment: NPWT dressing change       Yovanny Levine is a 49 y.o. male referred by:   [x] Physician  [] Nursing  [] Other:     Wound Identification:  Wound Type: diabetic  Contributing Factors: poor glucose control, chronic pressure, shear force, obesity, and decreased tissue oxygenation        PAST MEDICAL HISTORY        Diagnosis Date    Abscess 2010    scrotal    Acute renal failure (ARF) ([facility]) 2019    Anxiety associated with depression     Anxiety associated with depression     Back pain 2012    CAD (coronary artery disease) 02/10/2023    Chest pain 2013    Diabetic nephropathy ([facility])     Diabetic neuropathy ([facility]) 2011    Diabetic ulcer of left midfoot associated with type 2 diabetes mellitus, with fat layer exposed ([facility]) 2017    Diverticulosis     C scope + Dr. Medina    DM (diabetes mellitus), type 2 ([facility])     DR. 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 Medina    Nausea and vomiting 2019    Necrotizing fasciitis ([facility])     Nose fracture 1988    Panic attacks     Pericarditis     Hospitalized with s/p heart cath normal    Peripheral autonomic neuropathy due to diabetes mellitus ([facility])     Axonal EMG- NCS, 2011    Sebaceous cyst 2011    URI (upper respiratory infection) 2012    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 type 2 diabetes mellitus, with muscle involvement without evidence of necrosis ([facility]) 2021    WD-Wound, surgical, infected, initial encounter 2017    Wrist fracture 1986       PAST SURGICAL HISTORY    Past 
Infectious Disease Consult Note  6/10/2024   Patient Name: Yovanny Levine : 1975     Assessment  Left diabetic foot osteomyelitis  Admitted for fluid overload.  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  Culture was positive for Enterococcus faecium and Corynebacterium striatum.  He completed 6-week course of intravenous vancomycin on 2024.  Wound VAC is in place, but there is exposed bone and as such he still has osteomyelitis.  Will require another wound culture.  ESRD on HD; fluid overload  Right foot Charcot foot  Type 2 diabetes mellitus with polyneuropathy  Obesity class II  Comorbid conditions: Coronary artery disease    Plan  Therapeutic:  Metronidazole  Diagnostic:  Obtain wound culture during wound VAC change  Trend CRP  F/u:  Other:     Thank you for allowing me to consult in the care of this patient.  ------------------------  REASON FOR CONSULT: \"Follows as outpatient. was to complete antibiotic course. still signs of infeciton\"  Requested by: Anurag Mcarthur MD  HPI:Patient is a 49 y.o. male with coronary artery disease, hypertension, type 2 diabetes mellitus, hyperlipidemia, depression and chronic pain.  He was seen during her 2024 admission for polymicrobial left diabetic foot osteomyelitis that led to the fifth metatarsal amputation on 2024.  Culture was positive for Enterococcus faecium, corynebacterium striatum and Porphyromonas assacharolytics.  He was discharged to complete a 6-week course of intravenous vancomycin (EOT: 2024).  Patient who was admitted 2024 for further evaluation and management of fluid overload.  He is on hemodialysis and was referred to emergency department by the dialysis center.  He reported shortness of breath, and orthopnea.  He also had ventricular tachycardia in the 
[facility] ACUTE CARE PHYSICAL THERAPY EVALUATION  Yovanny Levine, 1975, 3130/3130-A, 6/12/2024    History  [place]:  The primary encounter diagnosis was Ventricular tachycardia (paroxysmal) ([facility]). Diagnoses of Acute congestive heart failure, unspecified heart failure type (HCC), Dyspnea, unspecified type, Acute respiratory failure, unspecified whether with hypoxia or hypercapnia (HCC), Osteomyelitis of left foot, unspecified type (HCC), CKD (chronic kidney disease) stage V requiring chronic dialysis (HCC), Pneumonia of left lower lobe due to infectious organism, Cardiomyopathy, unspecified type (HCC), and Chest pain, unspecified type were also pertinent to this visit.  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  Patient  has a past surgical history that includes Cardiac catheterization (2003, 2013); Tonsillectomy and adenoidectomy (1988); Upper gastrointestinal endoscopy (06/02/2011); Colonoscopy (06/02/2011); Nose surgery (1993); skin biopsy (N/A, 06/18/2013); other surgical 
03/13/2024    PROTIME 15.5 (H) 03/13/2024       EKG: (reviewed by myself): His ECG showed sinus rhythm with nonspecific ST-T changes.    ECHO:(reviewed by myself) his last echo from 5/3/2023 showed EF of 55 to 60% with mild left ventricular hypertrophy.    Chest Xray:(reviewed by myself): His chest x-ray showed left lower lung infiltrate and small left pleural effusion.      All labs, medications and tests reviewed by myself including data  from outside source , patient and available family .  Continue all other medications of all above medical condition listed as is.     Impression and Recommendations:    Fluid overload in the setting of end-stage renal disease  Ventricular tachycardia      49 y.o.year old with above medical history.  For now I would recommend to continue with amiodarone.  We will continue to look for the strips and based on that we will make final recommendations.  I will also continue with amlodipine, carvedilol 25 mg twice a day, hydralazine.      Thank you  much for consult and giving us the opportunity in contributing in the care of this patient. Please feel free to call me for any questions.       Sherwin George MD, 6/8/2024 2:27 PM     
performed by Jose Caba DPM at [facility] OR    SCROTAL SURGERY N/A 05/15/2021    SCROTAL AND PERINEAL DEBRIDEMENT performed by Elder Betnacur MD at [facility] OR    SCROTAL SURGERY N/A 05/16/2021    SCROTAL RE-DEBRIDEMENT  INCISION AND DRAINAGE performed by Elder Betancur MD at [facility] OR    SKIN BIOPSY N/A 06/18/2013    sebaceous cyst removal times 4     TOE AMPUTATION      right great toe    TOE AMPUTATION Right 03/23/2019    TOE AMPUTATION RIGHT 5TH TOE performed by Jose Caba DPM at [facility] OR    TOE AMPUTATION Right 08/06/2019    TOE AMPUTATION RIGHT 4TH TOE performed by Jose Caba DPM at [facility] OR    TOE AMPUTATION Left 9/6/2023    GREAT TOE TRANSMETATARSAL AMPUTATION performed by Sammy Hsieh MD at [facility] OR    TOE AMPUTATION Left 4/23/2024    left 5th TOE AMPUTATION performed by Claudy Forrest DO at [facility] OR    TONSILLECTOMY AND ADENOIDECTOMY  1988    UPPER GASTROINTESTINAL ENDOSCOPY  06/02/2011    Mild gastritis with moderatley severe antritis, small hiatal hernia, Dr. Medina    UPPER GASTROINTESTINAL ENDOSCOPY N/A 01/12/2019    EGD BIOPSY performed by Jose Maria Cornelius MD at [facility] ENDOSCOPY    UPPER GASTROINTESTINAL ENDOSCOPY N/A 07/26/2022    EGD DIAGNOSTIC ONLY performed by Jose Maria Cornelius MD at [facility] ENDOSCOPY    UPPER GASTROINTESTINAL ENDOSCOPY N/A 9/4/2023    EGD BIOPSY performed by Mia AMBRIZ MD at [facility] ENDOSCOPY       Current Medications:   Current Facility-Administered Medications   Medication Dose Route Frequency Provider Last Rate Last Admin    epoetin paola-epbx (RETACRIT) injection 10,000 Units  10,000 Units IntraVENous Once per day on Mon Wed Melody Chapman DO   10,000 Units at 06/10/24 1541    clopidogrel (PLAVIX) tablet 75 mg  75 mg Oral Daily Jon Escudero PA-C   75 mg at 06/11/24 1305    amiodarone (CORDARONE) tablet 200 mg  200 mg Oral Daily Sherwin George MD   200 mg at 06/11/24 1305    isosorbide mononitrate (IMDUR) extended release tablet 30 mg  30 mg Oral Daily 
Vomiting    Reglan [Metoclopramide] Anxiety       MEDICATIONS    No current facility-administered medications on file prior to encounter.     Current Outpatient Medications on File Prior to Encounter   Medication Sig Dispense Refill    Vitamin D (CHOLECALCIFEROL) 25 MCG (1000 UT) TABS tablet Take 5 tablets by mouth daily 150 tablet 0    Wound Cleansers (VASHE WOUND THERAPY) external solution During wound care 475 mL 0    calcitRIOL (ROCALTROL) 0.5 MCG capsule Take 1 capsule by mouth 2 times daily 30 capsule 0    albuterol sulfate HFA (VENTOLIN HFA) 108 (90 Base) MCG/ACT inhaler Inhale 2 puffs into the lungs 4 times daily as needed for Wheezing 18 g 0    insulin glargine (LANTUS SOLOSTAR) 100 UNIT/ML injection pen Inject 10 Units into the skin nightly 3 Adjustable Dose Pre-filled Pen Syringe 3    glucose 4 g chewable tablet Take 4 tablets by mouth as needed for Low blood sugar 60 tablet 3    hydrALAZINE (APRESOLINE) 25 MG tablet Take 1 tablet by mouth every 8 hours 90 tablet 3    amLODIPine (NORVASC) 5 MG tablet Take 1 tablet by mouth daily 30 tablet 3    atorvastatin (LIPITOR) 40 MG tablet Take 1 tablet by mouth nightly 30 tablet 3    carvedilol (COREG) 25 MG tablet Take 1 tablet by mouth 2 times daily (with meals)      calcium carbonate (TUMS) 500 MG chewable tablet Take 2 tablets by mouth 3 times daily (with meals)      [DISCONTINUED] metoprolol tartrate (LOPRESSOR) 25 MG tablet Take 0.5 tablets by mouth 2 times daily 30 tablet 0    [DISCONTINUED] furosemide (LASIX) 40 MG tablet Take 1 tablet by mouth daily 30 tablet 1    [DISCONTINUED] insulin aspart (NOVOLOG) 100 UNIT/ML injection vial Inject 35 Units into the skin 3 times daily (before meals)      [DISCONTINUED] chlorthalidone (HYGROTON) 25 MG tablet Take 1 tablet by mouth daily 30 tablet 3    [DISCONTINUED] linagliptin (TRADJENTA) 5 MG tablet Take 1 tablet by mouth daily 30 tablet 5    Lancets MISC 1 each by Does not apply route daily 100 each 3    glucose 
[Metoclopramide] Anxiety       MEDICATIONS    No current facility-administered medications on file prior to encounter.     Current Outpatient Medications on File Prior to Encounter   Medication Sig Dispense Refill    metroNIDAZOLE (FLAGYL) 500 MG tablet Take 1 tablet by mouth 3 times daily 90 tablet 0    Vitamin D (CHOLECALCIFEROL) 25 MCG (1000 UT) TABS tablet Take 5 tablets by mouth daily 150 tablet 0    Wound Cleansers (VASHE WOUND THERAPY) external solution During wound care 475 mL 0    calcitRIOL (ROCALTROL) 0.5 MCG capsule Take 1 capsule by mouth 2 times daily 30 capsule 0    albuterol sulfate HFA (VENTOLIN HFA) 108 (90 Base) MCG/ACT inhaler Inhale 2 puffs into the lungs 4 times daily as needed for Wheezing 18 g 0    insulin glargine (LANTUS SOLOSTAR) 100 UNIT/ML injection pen Inject 10 Units into the skin nightly 3 Adjustable Dose Pre-filled Pen Syringe 3    glucose 4 g chewable tablet Take 4 tablets by mouth as needed for Low blood sugar 60 tablet 3    hydrALAZINE (APRESOLINE) 25 MG tablet Take 1 tablet by mouth every 8 hours 90 tablet 3    amLODIPine (NORVASC) 5 MG tablet Take 1 tablet by mouth daily 30 tablet 3    atorvastatin (LIPITOR) 40 MG tablet Take 1 tablet by mouth nightly 30 tablet 3    carvedilol (COREG) 25 MG tablet Take 1 tablet by mouth 2 times daily (with meals)      calcium carbonate (TUMS) 500 MG chewable tablet Take 2 tablets by mouth 3 times daily (with meals) (Patient not taking: Reported on 6/7/2024)      [DISCONTINUED] metoprolol tartrate (LOPRESSOR) 25 MG tablet Take 0.5 tablets by mouth 2 times daily 30 tablet 0    [DISCONTINUED] furosemide (LASIX) 40 MG tablet Take 1 tablet by mouth daily 30 tablet 1    [DISCONTINUED] insulin aspart (NOVOLOG) 100 UNIT/ML injection vial Inject 35 Units into the skin 3 times daily (before meals)      [DISCONTINUED] chlorthalidone (HYGROTON) 25 MG tablet Take 1 tablet by mouth daily 30 tablet 3    [DISCONTINUED] linagliptin (TRADJENTA) 5 MG tablet Take 1 
  Drainage Amount Moderate (25-50%) 06/10/24 1130   Drainage Description Serosanguinous 06/10/24 1130   Odor Mild 06/10/24 1130   Shazia-wound Assessment Maceration;Hyperkeratosis (callous) 06/10/24 1130   Margins Defined edges 06/10/24 1130   Wound Thickness Description not for Pressure Injury Full thickness 06/10/24 1130   Number of days: 111       Wound 02/20/24 Wound #2 Left plantar (Active)   Wound Image   05/28/24 1120   Wound Etiology Diabetic 06/09/24 1700   Dressing Status Clean;Dry;Intact 06/09/24 1700   Wound Cleansed Soap and water 06/04/24 1112   Dressing/Treatment Hydrofiber Ag 05/22/24 1030   Offloading for Diabetic Foot Ulcers Post op shoe 06/04/24 1112   Wound Length (cm) 0.2 cm 06/04/24 1112   Wound Width (cm) 0.2 cm 06/04/24 1112   Wound Depth (cm) 0.1 cm 06/04/24 1112   Wound Surface Area (cm^2) 0.04 cm^2 06/04/24 1112   Change in Wound Size % (l*w) 84 06/04/24 1112   Wound Volume (cm^3) 0.004 cm^3 06/04/24 1112   Wound Healing % 97 06/04/24 1112   Post-Procedure Length (cm) 0.2 cm 06/04/24 1131   Post-Procedure Width (cm) 0.2 cm 06/04/24 1131   Post-Procedure Depth (cm) 0.1 cm 06/04/24 1131   Post-Procedure Surface Area (cm^2) 0.04 cm^2 06/04/24 1131   Post-Procedure Volume (cm^3) 0.004 cm^3 06/04/24 1131   Distance Tunneling (cm) 0 cm 05/28/24 1120   Tunneling Position ___ O'Clock 0 05/28/24 1120   Undermining Starts ___ O'Clock 0 05/28/24 1120   Undermining Ends___ O'Clock 0 05/28/24 1120   Undermining Maxium Distance (cm) 0 05/28/24 1120   Wound Assessment Pink/red 06/04/24 1112   Drainage Amount None (dry) 06/09/24 1700   Drainage Description Serosanguinous 05/21/24 1136   Odor None 06/04/24 1112   Shazia-wound Assessment Hyperkeratosis (callous) 06/04/24 1112   Margins Attached edges 06/04/24 1112   Wound Thickness Description not for Pressure Injury Full thickness 05/22/24 1030   Number of days: 111       Response to treatment:  Well tolerated by patient.     Pain Assessment:  Severity:

## 2024-06-17 NOTE — PROGRESS NOTES
[place] [address]  Phone: [phone]    Fax [phone]                  Elen Ibarra MD, [facility]       Eric Isbell MD, [facility]  Sukhi Schafer MD, [facility]    MD Miguel Angel Watkins MD Tariq Rizvi, MD Bilal Alam, MD Dr. Waseem Sajjad MD Melissa Kellis, APRKIMBERLEE Mondragon, APRKIMBERLEE Cabrera, APRKIMBERLEE Hernandez, APRN  Jon Escudero PA-C    CARDIOLOGY  NOTE      Name:  Yovanny Levine /Age/Sex: 1975  (49 y.o. male)   MRN & CSN:  [id number] & [id number] Admission Date/Time: 2024  6:59 PM   Location:  [address] [place] [address]-A PCP: José Luis Izquierdo MD       Hospital Day: 3    - Cardiology consult is for: Ventricular tachycardia?      ASSESSMENT/ PLAN:  Concern for ventricular tachycardia  Coronary artery disease s/p PCI   Exertional chest tightness  -No arrhythmia noted on telemetry or EKG. unable to find strips of ventricular tachycardia.  -EKG nonischemic, troponin chronically elevated  -Obtain second troponin  -Obtain echocardiogram tomorrow. May consider outpatient stress test  -Initiate patient on Imdur 30 mg daily.  -Continue aspirin, Lipitor 40 mg daily and Coreg 25 mg twice daily  -On amiodarone 400 mg twice daily  Volume overload in setting of end-stage renal disease  -Underwent dialysis yesterday.  Nephrology following  Type 2 diabetes mellitus  Hypertension  -Blood pressure stable at this time.  -Continue Norvasc 5 mg daily, hydralazine 25 mg 3 times daily          Subjective:  Yovanny is a 49 y.o.year old     Patient states that his shortness of breath and lower extremity edema has improved but still somewhat present.    Patient has also been complaining of some exertional chest tightness.  Pain is not at rest but with fairly minimal exertion he does have some tightness of his chest.  Did describe that this is similar to when he has needed a stent before.    Objective: 
    [address] [facility], [address]  [place] [address]  Phone: [phone]  Office Hours: 8:30AM - 4:30PM  Monday - Friday   Doing a little better  Not soa at rest  Lungs  decreased BS bilat  Will dialyze tomorrow  Check vanco level in am  
    V2.0    [facility] Progress Note      Name:  Yovanny Levine /Age/Sex: 1975  (49 y.o. male)   MRN & CSN:  [id number] & [id number] Encounter Date/Time: 2024 7:36 AM EDT   [facility] PCP: José Luis Izquierdo MD     Attending:Dereje Pastor*       Hospital Day: 5    Assessment and Recommendations   Yovanny Levine is a 49 y.o. male with pmh of coronary artery disease, hypertension, diabetes mellitus type 2, on long-term insulin, left foot osteomyelitis, hyperlipidemia, depression, and chronic pain  who presents with Fluid overload      Plan:   Hypervolemia  Patient sent in from dialysis, reportedly gained around 16 kgs.  --Dialyzing on schedule with extra dialysis , plan for UF today and one more tomorrew  --Strict I/O and daily weights     Ventricular tachycardia  Reportedly had 42 beats run of V. tach in ER  --Cardiology consulted, appreciate recs, echo showed EF 55-60%, slightly hypokinetic inferior wall, RVSP 48mmHG  -- Continue Amiodarone 200 mg BID    Chronically elevated troponin     Left foot osteomyelitis  Patient has a wound VAC  --Patient is on vancomycin, Flagyl  --ID consulted as was GS, no surgical intervention at this time. Continue metronidazole and follow up WCx  --Wound care following     Hypertension  --Continue home medications-amlodipine, carvedilol, hydralazine     ESRD on HD  Nephro on board, managing dialysis  --Continue epo, calcitriol, and calcium carbonate    Coronary artery disease  History of PCI and stenting to LAD, left circumflex  --[facility]-2022-patent stents  --Patient not taking aspirin, continue statin and coreg    Diabetes mellitus type 2, on long-term insulin  --Continue accucheks  --Continue Lantus 10 units nightly,, cover with insulin sliding scale and hypoglycemia protocol.     Hyperlipidemia  --Continue atorvastatin     Chronic pain  --Continue Percocet     Obesity with BMI 37.66      Diet ADULT DIET; Regular; 4 carb choices (60 gm/meal)   DVT 
    V2.0    [facility] Progress Note      Name:  Yovanny Levine /Age/Sex: 1975  (49 y.o. male)   MRN & CSN:  [id number] & [id number] Encounter Date/Time: 2024 7:36 AM EDT   [facility]  [facility] PCP: José Luis Izquierdo MD     Attending:Dereje Pastor*       Hospital Day: 5    Assessment and Recommendations   Yovanny Levine is a 49 y.o. male with pmh of coronary artery disease, hypertension, diabetes mellitus type 2, on long-term insulin, left foot osteomyelitis, hyperlipidemia, depression, and chronic pain  who presents with Fluid overload      Plan:   Hypervolemia  Patient sent in from dialysis, reportedly gained around 16 kgs.  --Dialyzing on schedule with extra dialysis , plan for UF today  --Strict I/O and daily weights     Ventricular tachycardia  Reportedly had 42 beats run of V. tach in ER  --Cardiology consulted, appreciate recs, echo showed EF 55-60%, slightly hypokinetic inferior wall, RVSP 48mmHG  -- Continue Amiodarone 200 mg BID    Chronically elevated troponin     Left foot osteomyelitis  Patient has a wound VAC  --Patient is on vancomycin, Flagyl  --ID consulted  --Wound care following     Hypertension  --Continue home medications-amlodipine, carvedilol, hydralazine     ESRD on HD  Nephro on board, managing dialysis  --Continue epo, calcitriol, and calcium carbonate    Coronary artery disease  History of PCI and stenting to LAD, left circumflex  --[facility]-2022-patent stents  --Patient not taking aspirin, continue statin and coreg    Diabetes mellitus type 2, on long-term insulin  --Continue accucheks  --Continue Lantus 10 units nightly,, cover with insulin sliding scale and hypoglycemia protocol.     Hyperlipidemia  --Continue atorvastatin     Chronic pain  --Continue Percocet     Obesity with BMI 37.66      Diet ADULT DIET; Regular; 4 carb choices (60 gm/meal)   DVT Prophylaxis [] Lovenox, [x]  Heparin, [] SCDs, [] Ambulation,  [] Eliquis, [] Xarelto  [] Coumadin   Code Status Full 
    V2.0    [facility] Progress Note      Name:  Yovanny Levine /Age/Sex: 1975  (49 y.o. male)   MRN & CSN:  [id number] & [id number] Encounter Date/Time: 2024 7:36 AM EDT   [facility]  [facility]A PCP: José Luis Izquierdo MD     Attending:Dereje Pastor*       Hospital Day: 5    Assessment and Recommendations   Yovanny Levine is a 49 y.o. male with pmh of coronary artery disease, hypertension, diabetes mellitus type 2, on long-term insulin, left foot osteomyelitis, hyperlipidemia, depression, and chronic pain  who presents with Fluid overload      Plan:   Hypervolemia  Patient sent in from dialysis, reportedly gained around 16 kgs.  --Dialyzed for 3 days  --Strict I/O and daily weights     Ventricular tachycardia  Reportedly had 42 beats run of V. tach in ER  --Cardiology consulted, appreciate recs, echo showed EF 55-60%, slightly hypokinetic inferior wall, RVSP 48mmHG  -- Continue Amiodarone 200 mg BID    Chronically elevated troponin     Left foot osteomyelitis  Patient has a wound VAC  --Patient is on vancomycin, Flagyl  --ID consulted as was GS, no surgical intervention at this time. Continue metronidazole and Wcx growing serratia, f/u sensitivities  --Wound care following     Hypertension  --Continue home medications-amlodipine, carvedilol, hydralazine     ESRD on HD  Nephro on board, managing dialysis  --Continue epo, calcitriol, and calcium carbonate    Coronary artery disease  History of PCI and stenting to LAD, left circumflex  --[facility]-2022-patent stents  --Patient not taking aspirin, continue statin and coreg    Diabetes mellitus type 2, on long-term insulin  --Continue accucheks  --Continue Lantus 10 units nightly,, cover with insulin sliding scale and hypoglycemia protocol.     Hyperlipidemia  --Continue atorvastatin     Chronic pain  --Continue Percocet     Obesity with BMI 37.66      Diet ADULT DIET; Regular; 4 carb choices (60 gm/meal)   DVT Prophylaxis [] Lovenox, [x]  Heparin, [] 
    V2.0  [facility] Hospitalist Progress Note      Name:  Yovanny Levine /Age/Sex: 1975  (49 y.o. male)   MRN & CSN:  [id number] & [id number] Encounter Date/Time: 2024 1:36 PM EDT    Location:  [address] [place] [address] PCP: José Luis Izquierdo MD       Hospital Day: 3    Assessment and Plan:   Yovanny Levine is a 49 y.o. male  who presents with Fluid overload      Plan:    Hypervolemia in setting of ESRD on HD  Patient sent in from dialysis  Nephrology consulted  Will have extra dialysis    Osteomyelitis  Patient with left foot osteo with a wound VAC in place.  On IV antibiotics at baseline and following with wound care  General surgery consulted: had been talk of possible need for surgical intervention  Infectious disease consulted  Continue vancomycin plus Flagyl    Type 2 diabetes  Continue Lantus 10 units nightly  Sliding scale insulin  Point-of-care glucose checks    CAD  Has had prior PCI and stenting to the LAD and multiple vessels  Continue beta-blocker, statin.      Diet ADULT DIET; Regular; 4 carb choices (60 gm/meal)   DVT Prophylaxis [] Lovenox, [x]  Heparin, [] SCDs, [] Ambulation,    [] Eliquis, [] Xarelto  [] Coumadin [] other   Code Status Full Code   Disposition From: Home  Expected Disposition: To be determined  Estimated Date of Discharge: 2 3 days  Patient requires continued admission due to x-ray dialysis and surgical evaluation   Surrogate Decision Maker/ POA      Personally reviewed Lab Studies and Imaging     Discussed management of the case with nephrology who recommended dialysis    EKG interpreted personally and results no acute ST changes    Drugs that require monitoring for toxicity include vancomycin and the method of monitoring was blood level monitoring    Subjective:     Chief Complaint: Shortness of Breath (Just left dialysis states \"fluid overload, sob, lightheaded\")       Patient with dialysis this morning and tolerated well.  Patient states that he is still volume up and overweight.  
    V2.0  [facility] [place]      Name:  Yovanny Levine /Age/Sex: 1975  (49 y.o. male)   MRN & CSN:  [id number] & [id number] Encounter Date/Time: 2024 1:36 PM EDT    Location:  [address] [place] [address] PCP: José Luis Izquierdo MD       Hospital Day: 2    Assessment and Plan:   Yovanny Levine is a 49 y.o. male  who presents with Fluid overload      Plan:    Hypervolemia in setting of ESRD on HD  Patient sent in from dialysis  Nephrology consulted  Will have extra dialysis    Osteomyelitis  Patient with left foot osteo with a wound VAC in place.  On IV antibiotics at baseline and following with wound care  General surgery consulted: had been talk of possible need for surgical intervention  Infectious disease consulted  Continue vancomycin plus Flagyl    Type 2 diabetes  Continue Lantus 10 units nightly  Sliding scale insulin  Point-of-care glucose checks    CAD  Has had prior PCI and stenting to the LAD and multiple vessels  Continue beta-blocker, statin.      Diet ADULT DIET; Regular; 4 carb choices (60 gm/meal)   DVT Prophylaxis [] Lovenox, [x]  Heparin, [] SCDs, [] Ambulation,    [] Eliquis, [] Xarelto  [] Coumadin [] other   Code Status Full Code   Disposition From: Home  Expected Disposition: To be determined  Estimated Date of Discharge: 2 3 days  Patient requires continued admission due to x-ray dialysis and surgical evaluation   Surrogate Decision Maker/ POA      Personally reviewed Lab Studies and Imaging     Discussed management of the case with nephrology who recommended dialysis    EKG interpreted personally and results no acute ST changes    Drugs that require monitoring for toxicity include vancomycin and the method of monitoring was blood level monitoring    Subjective:     Chief Complaint: Shortness of Breath (Just left dialysis states \"fluid overload, sob, lightheaded\")       Patient with dialysis this morning and tolerated well.  Patient states that he is still volume up and overweight.  
    V2.0  [facility] Hospitalist Progress Note      Name:  Yovanny Levine /Age/Sex: 1975  (49 y.o. male)   MRN & CSN:  [id number] & [id number] Encounter Date/Time: 6/10/2024 1:36 PM EDT    Location:  [address] [place] [address] PCP: José Luis Izquierdo MD       Hospital Day: 4    Assessment and Plan:   Yovanny Levine is a 49 y.o. male  who presents with Fluid overload      Plan:    Hypervolemia in setting of ESRD on HD  Patient sent in from dialysis  Nephrology consulted  Will have extra dialysis    Osteomyelitis  Patient with left foot osteo with a wound VAC in place.  On IV antibiotics at baseline and following with wound care  General surgery consulted: had been talk of possible need for surgical intervention  Infectious disease consulted  Continue vancomycin plus Flagyl    Type 2 diabetes  Continue Lantus 10 units nightly  Sliding scale insulin  Point-of-care glucose checks    CAD  Has had prior PCI and stenting to the LAD and multiple vessels  Continue beta-blocker, statin.      Diet ADULT DIET; Regular; 4 carb choices (60 gm/meal)   DVT Prophylaxis [] Lovenox, [x]  Heparin, [] SCDs, [] Ambulation,    [] Eliquis, [] Xarelto  [] Coumadin [] other   Code Status Full Code   Disposition From: Home  Expected Disposition: To be determined  Estimated Date of Discharge: 2 3 days  Patient requires continued admission due to x-ray dialysis and surgical evaluation   Surrogate Decision Maker/ POA      Personally reviewed Lab Studies and Imaging     Discussed management of the case with nephrology who recommended dialysis    EKG interpreted personally and results no acute ST changes    Drugs that require monitoring for toxicity include vancomycin and the method of monitoring was blood level monitoring    Subjective:     Chief Complaint: Shortness of Breath (Just left dialysis states \"fluid overload, sob, lightheaded\")     Patient still feels overweight.  And will continue to benefit from extra diuresis.    Review of Systems:    Review of 
   06/11/24 1551   Encounter Summary   Encounter Overview/Reason Initial Encounter   Service Provided For Patient   Referral/Consult From [facility]   Support System Spouse;Children   Last Encounter  06/11/24  (Patient was calmly watching television. Patient did not express any needs at this time.)   Complexity of Encounter Low   Begin Time 1545   End Time  1600   Total Time Calculated 15 min   Spiritual/Emotional needs   Type Spiritual Support   Grief, Loss, and Adjustments   Type Adjustment to illness   Assessment/Intervention/Outcome   Assessment Coping;Hopeful;Peaceful;Loneliness   Intervention Active listening;Empowerment;Sustaining Presence/Ministry of presence   Outcome Coping;Encouraged;Engaged in conversation;Expressed Gratitude;Receptive   Plan and Referrals   Plan/Referrals Continue Support (comment)       
  [address] [place] [address], [place] [address]  Phone: [phone]  Office Hours: 8:30AM - 4:30PM  Monday - Friday      Nephrology  Dialysis Note        PROCEDURE:  Patient seen during hemodialysis      PHYSICIAN:  PK      INDICATION:  Congestive heart failure, End-stage renal disease, fluid overload      RX:  See dialysis flowsheet for specifics on access, blood flow rate, dialysate baths, duration of dialysis, anticoagulation and other technical information.      COMMENTS:  tolerating  
  Physical Therapy Treatment Note  Name: Yovanny Levine MRN: [id number] :   1975   Date:  2024   Admission Date: 2024 Room:  [address] [place] [address]   Restrictions/Precautions:   general precautions, contact precautions, fall risk, post-op shoes to bilat feet, LLE heel touch WB with wound vac, signed bed alarm declination   Communication with other providers:  Pt okay to see for therapy per RN   Subjective:  Patient states:  \"I guess we can try\"   Pain:   Location, Type, Intensity (0/10 to 10/10):  Endorses pn at L LE with ambulation.   Objective:    Observation:  Pt supine in bed upon PTA arrival.   Objective Measures:  Tele, stable  Treatment, including education/measures:    Therapeutic Activity Training:   Therapeutic activity training was instructed today.  Cues were given for safety, sequence, UE/LE placement, awareness, and balance.  Activities performed today included bed mobility training, sup-sit, sit-stand, SPT.    Mobility:  Sup > sit: SBA  Scooting: SBA  STS: CGA from EOB x 2 reps.     Gait:  Pt ambulated 80ft with RW and CGA for safety. Pt demos decreased step length, decreased gait speed, good maintenance of WB restrictions and cues provided for the same. Pt demos decreased foot clearance and heavy reliance on UEs for ambulation.     Exercises:  Pt performed seated APs, LAQ, marching, hip abd with manual resistance, hip add with pillow, glut sets 1 x 10 ea.     Safety:   Pt returned safely to EOB, no chair alarm waiver signed, call light in reach, all needs met.   Assessment / Impression:    Pt tolerated increased gait distances well this date with min increase in fatigue but pt does present with increased pain with ambulation.   Patient's tolerance of treatment:  Good   Adverse Reaction: Pain  Significant change in status and impact:  none  Barriers to improvement:  none  Plan for Next Session:    Plan to continue OOB activities.   Time in:  1136  Time out:  1200  Timed treatment minutes:  
  Physician Progress Note      PATIENT:               MICHELINE WILSON  CSN #:                  [id number]  :                       1975  ADMIT DATE:       2024 6:59 PM  DISCH DATE:  RESPONDING  PROVIDER #:        PRAKASH CALVILLO          QUERY TEXT:    Pt admitted with Hypervolemia and has CHF documented in the ED and  PN. If   possible, please document in progress notes and discharge summary further   specificity regarding the type and acuity of CHF:    The medical record reflects the following:  Risk Factors: possible CHF  Clinical Indicators: Congestive heart failure, End-stage renal disease, fluid   overload, Acute congestive heart failure, unspecified heart failure type  Treatment: Home meds: Hydralazine, Norvasc, Coreg, Lopressor, Patient received   amiodarone bolus, IV Lasix 80 mg in ED.    Thank you Jailene Mansfield RN CDS [phone]  Options provided:  -- Acute on Chronic Systolic CHF/HFrEF  -- Acute on Chronic Diastolic CHF/HFpEF  -- Acute on Chronic Systolic and Diastolic CHF  -- Acute Systolic CHF/HFrEF  -- Acute Diastolic CHF/HFpEF  -- Acute Systolic and Diastolic CHF  -- CHF ruled out, fluid overload only  -- Other - I will add my own diagnosis  -- Disagree - Not applicable / Not valid  -- Disagree - Clinically unable to determine / Unknown  -- Refer to Clinical Documentation Reviewer    PROVIDER RESPONSE TEXT:    This patient is in acute on chronic diastolic CHF/HFpEF.    Query created by: Jailene Mansfield on 6/10/2024 3:45 PM      Electronically signed by:  PRAKASH CALVILLO 2024 4:40 PM          
 Nephrology  Dialysis Note        [address] [facility], [address] [place] [address]  Phone: [phone]  Office Hours: 8:30AM - 4:30PM  Monday - Friday          PROCEDURE:  Patient seen during hemodialysis      PHYSICIAN:  ANTHONY      INDICATION:  End-stage renal disease      RX:  See dialysis flowsheet for specifics on access, blood flow rate, dialysate baths, duration of dialysis, anticoagulation and other technical information.      COMMENTS:  SEEN IN HD  SET TO LOSE 3KG IF BP ALLOWS  DOING BETTER OVERALL    Electronically signed by Melody James DO on 6/14/2024 at 8:12 AM    [facility]  MD SAMANTHA SMITH DO  [address] [facility],  [address] [place] [address]  PHONE: [phone]  FAX: [phone]   
4 Eyes Skin Assessment     NAME:  Yovanny Levine  YOB: 1975  MEDICAL RECORD NUMBER:  [id number]    The patient is being assessed for  Admission    I agree that at least one RN has performed a thorough Head to Toe Skin Assessment on the patient. ALL assessment sites listed below have been assessed.      Areas assessed by both nurses:    Head, Face, Ears, Shoulders, Back, Chest, Arms, Elbows, Hands, Sacrum. Buttock, Coccyx, Ischium, Legs. Feet and Heels, and Under Medical Devices         Does the Patient have a Wound? Yes wound(s) were present on assessment. LDA wound assessment was Initiated and completed by RN       Pradeep Prevention initiated by RN: Yes  Wound Care Orders initiated by RN: Yes    Pressure Injury (Stage 3,4, Unstageable, DTI, NWPT, and Complex wounds) if present, place Wound referral order by RN under : Yes    New Ostomies, if present place, Ostomy referral order under : No     Nurse 1 eSignature: Electronically signed by Arik Palmer RN on 6/8/24 at 2:41 AM EDT    **SHARE this note so that the co-signing nurse can place an eSignature**    Nurse 2 eSignature: Electronically signed by Terese Clifford RN on 6/8/24 at 4:19 AM EDT   
Infectious Disease Progress Note  2024   Patient Name: Yovanny Levine : 1975     Assessment  Left diabetic foot osteomyelitis  Admitted for fluid overload.  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  Culture was positive for Enterococcus faecium and Corynebacterium striatum.  He completed 6-week course of intravenous vancomycin on 2024.  Wound VAC is in place, but there is exposed bone and as such he still has osteomyelitis.  Will require another wound culture.  ESRD on HD; fluid overload  Right foot Charcot foot  Type 2 diabetes mellitus with polyneuropathy  Obesity class II  Comorbid conditions: Coronary artery disease     Plan  Therapeutic:  Continue IV vancomycin and metronidazole  Diagnostic:    Trend CRP  F/u:   wound culture   Other:     Reason for visit: F/u left diabetic foot osteomyelitis  History:?Interval history noted  Denies n/v/d/f or untoward effects of antimicrobials  Physical Exam:  Vital Signs: BP (!) 145/87   Pulse 59   Temp 98.1 °F (36.7 °C) (Oral)   Resp 13   Ht 1.753 m (5' 9\")   Wt 125.5 kg (276 lb 10.8 oz)   SpO2 97%   BMI 40.86 kg/m²     Gen: alert and oriented X3, no distress  Skin: no stigmata of endocarditis  Wounds: left foot wound VAC   HEMT: AT/NC Oropharynx pink, moist, and without lesions or exudates; dentition in poor state of repair  Eyes: PERRLA, EOMI, conjunctiva pink, sclera anicteric.   Neck: Supple. Trachea midline. No LAD.  Chest: no distress and CTA. Good air movement.  Heart: RRR and no MRG.   Abd: soft, non-distended, no tenderness, no hepatomegaly. Normoactive bowel sounds.  Ext: no clubbing, cyanosis, or edema  Catheter Site: without erythema or tenderness  LDA:   Neuro: Mental status intact. CN 2-12 intact and no focal sensory or motor deficits     Radiologic / Imaging / TESTING  No 
Infectious Disease Progress Note  2024   Patient Name: Yovanny Levine : 1975     Assessment  Left diabetic foot osteomyelitis  Admitted for fluid overload.  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  Culture was positive for Enterococcus faecium and Corynebacterium striatum.  He completed 6-week course of intravenous vancomycin on 2024.  Wound VAC is in place, but there is exposed bone and as such he still has osteomyelitis.  Will require another wound culture.  Wound culture positive for Serratia marcescens (susceptible to ceftriaxone, Bactrim and fluoroquinolones)  QTc 491  Afebrile, clinically stable  ESRD on HD; fluid overload  Improving, HD on MWF  Right foot Charcot foot  Type 2 diabetes mellitus with polyneuropathy  Obesity class II  Comorbid conditions: Coronary artery disease     Plan  Therapeutic:  continue Bactrim SS 1 tab po bid for 6 weeks (EoT: 2024)  Diagnostic:    Trend CRP  F/u:   wound culture   Other:   Patient may be discharged from infectious diseases service viewpoint. Follow in the office in 1 weeks.  Will sign off and not follow the patient actively, please reconsult as needed.      Reason for visit: F/u left diabetic foot osteomyelitis  History:?Interval history noted  Denies n/v/d/f or untoward effects of antimicrobials  Physical Exam:  Vital Signs: /82   Pulse 66   Temp 98.5 °F (36.9 °C)   Resp 17   Ht 1.753 m (5' 9\")   Wt 125.5 kg (276 lb 10.8 oz)   SpO2 97%   BMI 40.86 kg/m²     Gen: alert and oriented X3, no distress  Skin: no stigmata of endocarditis  Wounds: left foot wound VAC   HEMT: AT/NC Oropharynx pink, moist, and without lesions or exudates; dentition in poor state of repair  Eyes: PERRLA, EOMI, conjunctiva pink, sclera anicteric.   Neck: Supple. Trachea midline. No LAD.  Chest: no 
Infectious Disease Progress Note  2024   Patient Name: Yovanny Levine : 1975     Assessment  Left diabetic foot osteomyelitis  Admitted for fluid overload.  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  Culture was positive for Enterococcus faecium and Corynebacterium striatum.  He completed 6-week course of intravenous vancomycin on 2024.  Wound VAC is in place, but there is exposed bone and as such he still has osteomyelitis.  Will require another wound culture.  Wound culture positive for Serratia marcescens (susceptible to ceftriaxone, Bactrim and fluoroquinolones)  QTc 491  ESRD on HD; fluid overload  Right foot Charcot foot  Type 2 diabetes mellitus with polyneuropathy  Obesity class II  Comorbid conditions: Coronary artery disease     Plan  Therapeutic:  Discontinue vancomycin and metronidazole  Start Bactrim SS 1 tab po bid for 6 weeks   Diagnostic:    Trend CRP  F/u:   wound culture   Other:   Patient may be discharged from infectious diseases service viewpoint. Follow in the office in 1 weeks.      Reason for visit: F/u left diabetic foot osteomyelitis  History:?Interval history noted  Denies n/v/d/f or untoward effects of antimicrobials  Physical Exam:  Vital Signs: BP (!) 138/51   Pulse 65   Temp 98.7 °F (37.1 °C) (Oral)   Resp 17   Ht 1.753 m (5' 9\")   Wt 125.5 kg (276 lb 10.8 oz)   SpO2 96%   BMI 40.86 kg/m²     Gen: alert and oriented X3, no distress  Skin: no stigmata of endocarditis  Wounds: left foot wound VAC   HEMT: AT/NC Oropharynx pink, moist, and without lesions or exudates; dentition in poor state of repair  Eyes: PERRLA, EOMI, conjunctiva pink, sclera anicteric.   Neck: Supple. Trachea midline. No LAD.  Chest: no distress and CTA. Good air movement.  Heart: RRR and no MRG.   Abd: soft, non-distended, no 
Nephrology Progress Note        [address] [facility], [address] [place] [address]  Phone: [phone]  Office Hours: 8:30AM - 4:30PM  Monday - Friday        6/10/2024 7:12 AM  Subjective:   Admit Date: 6/7/2024  PCP: José Luis Izquierdo MD  Interval History:   On NC  BP on the low side    Diet: ADULT DIET; Regular; 4 carb choices (60 gm/meal)      Data:   Scheduled Meds:   epoetin paola-epbx  10,000 Units IntraVENous Once per day on Mon Wed Fri    isosorbide mononitrate  30 mg Oral Daily    amiodarone  400 mg Oral Daily    vancomycin (VANCOCIN) intermittent dosing (placeholder)   Other RX Placeholder    sodium chloride flush  5-40 mL IntraVENous 2 times per day    heparin (porcine)  5,000 Units SubCUTAneous 3 times per day    amLODIPine  5 mg Oral Daily    atorvastatin  40 mg Oral Nightly    calcitRIOL  0.5 mcg Oral BID    calcium carbonate  2 tablet Oral TID WC    carvedilol  25 mg Oral BID WC    hydrALAZINE  25 mg Oral 3 times per day    insulin glargine  10 Units SubCUTAneous Nightly    metroNIDAZOLE  500 mg Oral TID    Vitamin D  5,000 Units Oral Daily    insulin lispro  0-8 Units SubCUTAneous TID WC    insulin lispro  0-4 Units SubCUTAneous Nightly     Continuous Infusions:   sodium chloride      dextrose       PRN Meds:sodium chloride flush, sodium chloride, ondansetron **OR** ondansetron, polyethylene glycol, acetaminophen **OR** acetaminophen, albuterol sulfate HFA, oxyCODONE-acetaminophen, glucose, dextrose bolus **OR** dextrose bolus, glucagon (rDNA), dextrose  No intake/output data recorded.  No intake/output data recorded.  No intake or output data in the 24 hours ending 06/10/24 0712    CBC:   Recent Labs     06/07/24 1918 06/08/24  1149 06/09/24  0838   WBC 6.4 5.7 5.3   HGB 9.8* 9.9* 8.9*    183 163       BMP:    Recent Labs     06/07/24 1918 06/08/24  1149 06/09/24  0838    138 137   K 4.1 4.3 5.1   CL 95* 98* 97*   CO2 29 25 24   BUN 41* 27* 53*   CREATININE 4.6* 3.4* 5.7* 
Nephrology Progress Note        [address] [facility], [address] [place] [address]  Phone: [phone]  Office Hours: 8:30AM - 4:30PM  Monday - Friday 6/12/2024 7:34 AM  Subjective:   Admit Date: 6/7/2024  PCP: José Luis Izquierdo MD  Interval History:   On room air  Sitting at the edge of the bed    Diet: ADULT DIET; Regular; 4 carb choices (60 gm/meal)      Data:   Scheduled Meds:   heparin (porcine)  3,000 Units IntraCATHeter Once in dialysis    epoetin paola-epbx  10,000 Units IntraVENous Once per day on Mon Wed Fri    clopidogrel  75 mg Oral Daily    amiodarone  200 mg Oral Daily    isosorbide mononitrate  30 mg Oral Daily    vancomycin (VANCOCIN) intermittent dosing (placeholder)   Other RX Placeholder    sodium chloride flush  5-40 mL IntraVENous 2 times per day    heparin (porcine)  5,000 Units SubCUTAneous 3 times per day    amLODIPine  5 mg Oral Daily    atorvastatin  40 mg Oral Nightly    calcitRIOL  0.5 mcg Oral BID    calcium carbonate  2 tablet Oral TID WC    carvedilol  25 mg Oral BID WC    hydrALAZINE  25 mg Oral 3 times per day    insulin glargine  10 Units SubCUTAneous Nightly    metroNIDAZOLE  500 mg Oral TID    Vitamin D  5,000 Units Oral Daily    insulin lispro  0-8 Units SubCUTAneous TID WC    insulin lispro  0-4 Units SubCUTAneous Nightly     Continuous Infusions:   sodium chloride      dextrose       PRN Meds:sodium chloride flush, sodium chloride, ondansetron **OR** ondansetron, polyethylene glycol, acetaminophen **OR** acetaminophen, albuterol sulfate HFA, oxyCODONE-acetaminophen, glucose, dextrose bolus **OR** dextrose bolus, glucagon (rDNA), dextrose  I/O last 3 completed shifts:  In: 620 [P.O.:120]  Out: 4000   No intake/output data recorded.    Intake/Output Summary (Last 24 hours) at 6/12/2024 0734  Last data filed at 6/11/2024 2100  Gross per 24 hour   Intake 620 ml   Output 4000 ml   Net -3380 ml       CBC:   Recent Labs     06/10/24  0608 06/11/24  0232 
Nephrology Progress Note        [address] [facility], [address]  [place] [address]  Phone: [phone]  Office Hours: 8:30AM - 4:30PM  Monday - Friday        6/15/2024 8:31 AM  Subjective:   Admit Date: 6/7/2024  PCP: José Luis Izquierdo MD  Interval History:   On NC  Doing ok  Awaiting [facility] rehab    Diet: ADULT DIET; Regular; 4 carb choices (60 gm/meal); Low Potassium (Less than 3000 mg/day); 1500 ml      Data:   Scheduled Meds:   heparin (porcine)  2,400 Units IntraVENous Once in dialysis    sulfamethoxazole-trimethoprim  1 tablet Oral BID    epoetin paola-epbx  10,000 Units IntraVENous Once per day on Mon Wed Fri    clopidogrel  75 mg Oral Daily    amiodarone  200 mg Oral Daily    isosorbide mononitrate  30 mg Oral Daily    sodium chloride flush  5-40 mL IntraVENous 2 times per day    heparin (porcine)  5,000 Units SubCUTAneous 3 times per day    amLODIPine  5 mg Oral Daily    atorvastatin  40 mg Oral Nightly    calcitRIOL  0.5 mcg Oral BID    calcium carbonate  2 tablet Oral TID WC    carvedilol  25 mg Oral BID WC    hydrALAZINE  25 mg Oral 3 times per day    insulin glargine  10 Units SubCUTAneous Nightly    metroNIDAZOLE  500 mg Oral TID    Vitamin D  5,000 Units Oral Daily    insulin lispro  0-8 Units SubCUTAneous TID WC    insulin lispro  0-4 Units SubCUTAneous Nightly     Continuous Infusions:   sodium chloride      dextrose       PRN Meds:sodium chloride flush, sodium chloride, ondansetron **OR** ondansetron, polyethylene glycol, acetaminophen **OR** acetaminophen, albuterol sulfate HFA, oxyCODONE-acetaminophen, glucose, dextrose bolus **OR** dextrose bolus, glucagon (rDNA), dextrose  I/O last 3 completed shifts:  In: 4425 [P.O.:520; I.V.:3405]  Out: 3750 [Drains:250]  No intake/output data recorded.    Intake/Output Summary (Last 24 hours) at 6/15/2024 0831  Last data filed at 6/14/2024 1510  Gross per 24 hour   Intake 4100 ml   Output 3500 ml   Net 600 ml       CBC:   Recent Labs     
Nephrology Progress Note        [address] [facility], [address]  [place] [address]  Phone: [phone]  Office Hours: 8:30AM - 4:30PM  Monday - Friday 6/17/2024 7:04 AM  Subjective:   Admit Date: 6/7/2024  PCP: José Luis Izquierdo MD  Interval History:   On NC  Doing well    Diet: ADULT DIET; Regular; 4 carb choices (60 gm/meal); Low Potassium (Less than 3000 mg/day); 1500 ml      Data:   Scheduled Meds:   heparin (porcine)  3,400 Units IntraVENous Once in dialysis    sulfamethoxazole-trimethoprim  1 tablet Oral BID    epoetin paola-epbx  10,000 Units IntraVENous Once per day on Mon Wed Fri    clopidogrel  75 mg Oral Daily    amiodarone  200 mg Oral Daily    isosorbide mononitrate  30 mg Oral Daily    sodium chloride flush  5-40 mL IntraVENous 2 times per day    heparin (porcine)  5,000 Units SubCUTAneous 3 times per day    amLODIPine  5 mg Oral Daily    atorvastatin  40 mg Oral Nightly    calcitRIOL  0.5 mcg Oral BID    calcium carbonate  2 tablet Oral TID WC    carvedilol  25 mg Oral BID WC    hydrALAZINE  25 mg Oral 3 times per day    insulin glargine  10 Units SubCUTAneous Nightly    metroNIDAZOLE  500 mg Oral TID    Vitamin D  5,000 Units Oral Daily    insulin lispro  0-8 Units SubCUTAneous TID WC    insulin lispro  0-4 Units SubCUTAneous Nightly     Continuous Infusions:   sodium chloride      dextrose       PRN Meds:sodium chloride flush, sodium chloride, ondansetron **OR** ondansetron, polyethylene glycol, acetaminophen **OR** acetaminophen, albuterol sulfate HFA, oxyCODONE-acetaminophen, glucose, dextrose bolus **OR** dextrose bolus, glucagon (rDNA), dextrose  I/O last 3 completed shifts:  In: 610 [P.O.:600; I.V.:10]  Out: 350 [Drains:350]  No intake/output data recorded.    Intake/Output Summary (Last 24 hours) at 6/17/2024 0704  Last data filed at 6/17/2024 0424  Gross per 24 hour   Intake 360 ml   Output --   Net 360 ml           Objective:   Vitals: BP (!) 158/94   Pulse 67   Temp 98.5 
Nephrology Progress Note        [address] [facility], [address] [place] [address]  Phone: [phone]  Office Hours: 8:30AM - 4:30PM  Monday - Friday 6/11/2024 7:45 AM  Subjective:   Admit Date: 6/7/2024  PCP: José Luis Izquierdo MD  Interval History:   On NC  Doing ok    Diet: ADULT DIET; Regular; 4 carb choices (60 gm/meal)      Data:   Scheduled Meds:   heparin (porcine)  3,400 Units IntraVENous Once in dialysis    epoetin paola-epbx  10,000 Units IntraVENous Once per day on Mon Wed Fri    clopidogrel  75 mg Oral Daily    amiodarone  200 mg Oral Daily    isosorbide mononitrate  30 mg Oral Daily    vancomycin (VANCOCIN) intermittent dosing (placeholder)   Other RX Placeholder    sodium chloride flush  5-40 mL IntraVENous 2 times per day    heparin (porcine)  5,000 Units SubCUTAneous 3 times per day    amLODIPine  5 mg Oral Daily    atorvastatin  40 mg Oral Nightly    calcitRIOL  0.5 mcg Oral BID    calcium carbonate  2 tablet Oral TID WC    carvedilol  25 mg Oral BID WC    hydrALAZINE  25 mg Oral 3 times per day    insulin glargine  10 Units SubCUTAneous Nightly    metroNIDAZOLE  500 mg Oral TID    Vitamin D  5,000 Units Oral Daily    insulin lispro  0-8 Units SubCUTAneous TID WC    insulin lispro  0-4 Units SubCUTAneous Nightly     Continuous Infusions:   sodium chloride      dextrose       PRN Meds:sodium chloride flush, sodium chloride, ondansetron **OR** ondansetron, polyethylene glycol, acetaminophen **OR** acetaminophen, albuterol sulfate HFA, oxyCODONE-acetaminophen, glucose, dextrose bolus **OR** dextrose bolus, glucagon (rDNA), dextrose  I/O last 3 completed shifts:  In: 500   Out: 2604   No intake/output data recorded.    Intake/Output Summary (Last 24 hours) at 6/11/2024 0745  Last data filed at 6/10/2024 1600  Gross per 24 hour   Intake 500 ml   Output 2604 ml   Net -2104 ml       CBC:   Recent Labs     06/09/24  0838 06/10/24  0608 06/11/24  0232   WBC 5.3 5.1 6.1   HGB 8.9* 8.5* 8.5* 
Nephrology Progress Note        [address] [facility], [address] [place] [address]  Phone: [phone]  Office Hours: 8:30AM - 4:30PM  Monday - Friday 6/13/2024 7:30 AM  Subjective:   Admit Date: 6/7/2024  PCP: José Luis Izquierdo MD  Interval History:   On NC  Feeling lighter    Diet: ADULT DIET; Regular; 4 carb choices (60 gm/meal); Low Potassium (Less than 3000 mg/day); 1500 ml      Data:   Scheduled Meds:   heparin (porcine)  3,000 Units IntraCATHeter Once in dialysis    epoetin paola-epbx  10,000 Units IntraVENous Once per day on Mon Wed Fri    clopidogrel  75 mg Oral Daily    amiodarone  200 mg Oral Daily    isosorbide mononitrate  30 mg Oral Daily    vancomycin (VANCOCIN) intermittent dosing (placeholder)   Other RX Placeholder    sodium chloride flush  5-40 mL IntraVENous 2 times per day    heparin (porcine)  5,000 Units SubCUTAneous 3 times per day    amLODIPine  5 mg Oral Daily    atorvastatin  40 mg Oral Nightly    calcitRIOL  0.5 mcg Oral BID    calcium carbonate  2 tablet Oral TID WC    carvedilol  25 mg Oral BID WC    hydrALAZINE  25 mg Oral 3 times per day    insulin glargine  10 Units SubCUTAneous Nightly    metroNIDAZOLE  500 mg Oral TID    Vitamin D  5,000 Units Oral Daily    insulin lispro  0-8 Units SubCUTAneous TID WC    insulin lispro  0-4 Units SubCUTAneous Nightly     Continuous Infusions:   sodium chloride      dextrose       PRN Meds:sodium chloride flush, sodium chloride, ondansetron **OR** ondansetron, polyethylene glycol, acetaminophen **OR** acetaminophen, albuterol sulfate HFA, oxyCODONE-acetaminophen, glucose, dextrose bolus **OR** dextrose bolus, glucagon (rDNA), dextrose  I/O last 3 completed shifts:  In: 3980 [P.O.:480; I.V.:3000]  Out: 3700   No intake/output data recorded.    Intake/Output Summary (Last 24 hours) at 6/13/2024 0730  Last data filed at 6/12/2024 2100  Gross per 24 hour   Intake 3860 ml   Output 3700 ml   Net 160 ml       CBC:   Recent Labs     
Occupational Therapy  Occupational therapy attempted to see pt today for treatment. Pt in bed sleeping. Provided education to the pt regarding the importance of therapy for discharge planning and functional independence. Pt verbalized understanding but continued to defer occupational therapy session d/t wanting to wait for wound care prior to participating in therapy. Will re-attempt treatment at a later time.     
On NC  Next HD on Monday  Awaiting rehab placement    Patient Active Problem List    Diagnosis Date Noted    Volume overload 02/22/2023    Acute on chronic respiratory failure with hypoxia ([facility]) 02/10/2023    CAD (coronary artery disease) 02/10/2023    Fluid overload 02/07/2023    Problem with vascular access 11/26/2022    ESRD (end stage renal disease) ([facility]) 09/12/2022    Diabetic foot ulcer with osteomyelitis ([facility]) 09/01/2022    NSTEMI (non-ST elevated myocardial infarction) ([facility]) 08/24/2022    Chest tightness 08/22/2022    CARMEN (acute kidney injury) ([facility]) 07/25/2022    Anemia 07/25/2022    Recurrent major depressive disorder, in full remission ([facility]) 05/18/2021    Generalized anxiety disorder 05/18/2021    Ventricular tachycardia (paroxysmal) ([facility]) 06/11/2024    Diabetic ulcer of midfoot associated with type 2 diabetes mellitus, with muscle involvement without evidence of necrosis ([facility]) 05/31/2024    Presence of surgical incision 04/30/2024    Diabetic ulcer of toe of right foot associated with type 2 diabetes mellitus, with necrosis of muscle ([facility]) 04/30/2024    ESRD (end stage renal disease) on dialysis ([facility]) 04/25/2024    Hyperkalemia 04/08/2024    Absence of toe of left foot ([facility]) 02/20/2024    Diabetic ulcer of left midfoot associated with diabetes mellitus due to underlying condition, with fat layer exposed ([facility]) 01/12/2024    Open wound of foot, left, sequela 01/12/2024    Chronic foot pain, left 01/12/2024    Open wound of plantar aspect of left foot 10/17/2023    Acute osteomyelitis of metatarsal bone of left foot ([facility]) 09/05/2023    Gangrene ([facility]) 08/01/2023    Surgical wound dehiscence 02/08/2022    Chest pain 12/21/2021    Other proteinuria     FH: CAD (coronary artery disease) 09/29/2021    ASCVD (arteriosclerotic cardiovascular disease) 09/29/2021    Chronic kidney disease, stage IV (severe) ([facility]) 09/28/2021    Nephrotic syndrome 09/22/2021    Generalized edema 09/22/2021    Stage 3b chronic kidney 
Outpatient Pharmacy Progress Note for Meds-to-Beds    The outpatient pharmacy received prescription(s) for the patient, however, the patient is going to an assisted living facility or SNF. The facility will provide the patient's home medications. The outpatient pharmacy will profile the prescriptions and have them on file.    A medication list and facesheet should be provided to facility so their staff can provide the appropriate medications.       Thank you for letting [place] [address]    Phone: [phone]    Fax: [phone]        
PHARMACY VANCOMYCIN MONITORING SERVICE  Pharmacy consulted by Dr. Stuart for monitoring and adjustment.    Indication for treatment: Vancomycin indication: Bone/Joint infection   Goal trough: Trough Goal: 15-20 mcg/mL  AUC/PAMELA: 400-600    Risk Factors for MRSA Identified:   Hospitalization within the past 90 days, Patient on hemodialysis, Received IV antibiotics within the past 90 days    Pertinent Laboratory Values:   Temp Readings from Last 3 Encounters:   06/07/24 98.6 °F (37 °C) (Oral)   06/04/24 98.2 °F (36.8 °C) (Temporal)   06/02/24 98.2 °F (36.8 °C) (Oral)     Recent Labs     06/07/24 1918 06/07/24 2010   WBC 6.4  --    LACTATE  --  1.1     Recent Labs     06/07/24 1918   BUN 41*   CREATININE 4.6*     Estimated Creatinine Clearance: 25 mL/min (A) (based on SCr of 4.6 mg/dL (H)).  No intake or output data in the 24 hours ending 06/08/24 0326  Last Encounter Weight:  Wt Readings from Last 3 Encounters:   06/07/24 122.5 kg (270 lb)   05/22/24 117.9 kg (260 lb)   04/26/24 123.2 kg (271 lb 9.7 oz)       Pertinent Cultures:   Date    Source    Results      Vancomycin level:   TROUGH:  No results for input(s): \"VANCOTROUGH\" in the last 72 hours.  RANDOM:  No results for input(s): \"VANCORANDOM\" in the last 72 hours.    Assessment:  HPI: 49 year old male with pmh of hypertension, CAD, hyperlipidemia, diabetic foot ulcer and ESRD on HD presented to the ED from dialysis clinic with fluid overload. Patient had dialysis yesterday.  Nephrology plans to dialyze him again today. Patient was receiving vancomycin in an outpatient setting for 6 weeks for osteomyelitis, which should have ended 6/4/24. However, patient states he had a dose of vancomycin during dialysis yesterday.  SCr, BUN, and urine output: ESRD on HD on MWF  Day(s) of therapy: 1  Vancomycin concentration: To be collected    Plan:  Patient received an unknown amount of vancomycin at an unknown time before admission  Intermittent dosing based on levels 
PHARMACY VANCOMYCIN MONITORING SERVICE  Pharmacy consulted by Dr. Stuart for monitoring and adjustment.    Indication for treatment: Vancomycin indication: Bone/Joint infection   Goal trough: Trough Goal: 15-20 mcg/mL  AUC/PAMELA: 400-600    Risk Factors for MRSA Identified:   Hospitalization within the past 90 days, Patient on hemodialysis, Received IV antibiotics within the past 90 days    Pertinent Laboratory Values:   Temp Readings from Last 3 Encounters:   06/08/24 98 °F (36.7 °C)   06/04/24 98.2 °F (36.8 °C) (Temporal)   06/02/24 98.2 °F (36.8 °C) (Oral)     Recent Labs     06/07/24 1918 06/07/24 2010   WBC 6.4  --    LACTATE  --  1.1       Recent Labs     06/07/24 1918   BUN 41*   CREATININE 4.6*       Estimated Creatinine Clearance: 25 mL/min (A) (based on SCr of 4.6 mg/dL (H)).  No intake or output data in the 24 hours ending 06/08/24 1121  Last Encounter Weight:  Wt Readings from Last 3 Encounters:   06/07/24 122.5 kg (270 lb)   05/22/24 117.9 kg (260 lb)   04/26/24 123.2 kg (271 lb 9.7 oz)       Pertinent Cultures:   Date    Source    Results  04/23/24  Tissue    E. vaecium, C. striatum    Vancomycin level:   TROUGH:  No results for input(s): \"VANCOTROUGH\" in the last 72 hours.  RANDOM:  No results for input(s): \"VANCORANDOM\" in the last 72 hours.    Assessment:  HPI: 49 year old male with pmh of hypertension, CAD, hyperlipidemia, diabetic foot ulcer and ESRD on HD presented to the ED from dialysis clinic with fluid overload. Patient had dialysis yesterday.  Nephrology plans to dialyze him again today. Patient was receiving vancomycin in an outpatient setting for 6 weeks for osteomyelitis due to ampicillin resistant Enterococcus faecium and Corynebacterium striatum, which was scheduled to end 6/4/24. However, patient states he had a dose of vancomycin during dialysis 6/7/24.  SCr, BUN, and urine output:  ESRD on HD on MWF  Extra HD session scheduled for 6/8/24  Day(s) of therapy: 1  Vancomycin 
PHARMACY VANCOMYCIN MONITORING SERVICE  Pharmacy consulted by Dr. Stuart for monitoring and adjustment.    Indication for treatment: Vancomycin indication: Bone/Joint infection   Goal trough: Trough Goal: 15-20 mcg/mL  AUC/PAMELA: 400-600    Risk Factors for MRSA Identified:   Hospitalization within the past 90 days, Patient on hemodialysis, Received IV antibiotics within the past 90 days    Pertinent Laboratory Values:   Temp Readings from Last 3 Encounters:   06/09/24 97.7 °F (36.5 °C) (Oral)   06/04/24 98.2 °F (36.8 °C) (Temporal)   06/02/24 98.2 °F (36.8 °C) (Oral)     Recent Labs     06/07/24 1918 06/07/24 2010 06/08/24  1149 06/09/24  0838   WBC 6.4  --  5.7 5.3   LACTATE  --  1.1  --   --        Recent Labs     06/07/24 1918 06/08/24 1149 06/09/24  0838   BUN 41* 27* 53*   CREATININE 4.6* 3.4* 5.7*       Estimated Creatinine Clearance: 20 mL/min (A) (based on SCr of 5.7 mg/dL (H)).    Intake/Output Summary (Last 24 hours) at 6/9/2024 1003  Last data filed at 6/8/2024 1139  Gross per 24 hour   Intake 1000 ml   Output 5453 ml   Net -4453 ml     Last Encounter Weight:  Wt Readings from Last 3 Encounters:   06/07/24 122.5 kg (270 lb)   05/22/24 117.9 kg (260 lb)   04/26/24 123.2 kg (271 lb 9.7 oz)       Pertinent Cultures:   Date    Source    Results  04/23/24  Tissue    E. vaecium, C. striatum    Vancomycin level:   TROUGH:  No results for input(s): \"VANCOTROUGH\" in the last 72 hours.  RANDOM:    Recent Labs     06/09/24  0838   VANCORANDOM 21.5       Assessment:  HPI: 49 year old male with pmh of hypertension, CAD, hyperlipidemia, diabetic foot ulcer and ESRD on HD presented to the ED from dialysis clinic with fluid overload. Patient had dialysis yesterday.  Nephrology plans to dialyze him again today. Patient was receiving vancomycin in an outpatient setting for 6 weeks for osteomyelitis due to ampicillin resistant Enterococcus faecium and Corynebacterium striatum, which was scheduled to end 6/4/24. 
PHARMACY VANCOMYCIN MONITORING SERVICE  Pharmacy consulted by Dr. Stuart for monitoring and adjustment.    Indication for treatment: Vancomycin indication: Bone/Joint infection   Goal trough: Trough Goal: 15-20 mcg/mL  AUC/PAMELA: 400-600    Risk Factors for MRSA Identified:   Hospitalization within the past 90 days, Patient on hemodialysis, Received IV antibiotics within the past 90 days    Pertinent Laboratory Values:   Temp Readings from Last 3 Encounters:   06/10/24 98.1 °F (36.7 °C) (Oral)   06/04/24 98.2 °F (36.8 °C) (Temporal)   06/02/24 98.2 °F (36.8 °C) (Oral)     Recent Labs     06/07/24 2010 06/08/24  1149 06/09/24  0838 06/10/24  0608   WBC  --  5.7 5.3 5.1   LACTATE 1.1  --   --   --        Recent Labs     06/08/24 1149 06/09/24  0838 06/10/24  0608   BUN 27* 53* 73*   CREATININE 3.4* 5.7* 7.0*       Estimated Creatinine Clearance: 17 mL/min (A) (based on SCr of 7 mg/dL (H)).  No intake or output data in the 24 hours ending 06/10/24 1300    Last Encounter Weight:  Wt Readings from Last 3 Encounters:   06/10/24 122.5 kg (270 lb)   05/22/24 117.9 kg (260 lb)   04/26/24 123.2 kg (271 lb 9.7 oz)       Pertinent Cultures:   Date    Source    Results  04/23/24  Tissue    E. vaecium, C. striatum    Vancomycin level:   TROUGH:  No results for input(s): \"VANCOTROUGH\" in the last 72 hours.  RANDOM:    Recent Labs     06/09/24  0838 06/10/24  0608   VANCORANDOM 21.5 21.5         Assessment:  HPI: 49 year old male with pmh of hypertension, CAD, hyperlipidemia, diabetic foot ulcer and ESRD on HD presented to the ED from dialysis clinic with fluid overload. Patient had dialysis yesterday.  Nephrology plans to dialyze him again today. Patient was receiving vancomycin in an outpatient setting for 6 weeks for osteomyelitis due to ampicillin resistant Enterococcus faecium and Corynebacterium striatum, which was scheduled to end 6/4/24. However, patient states he had a dose of vancomycin during dialysis 6/7/24.  SCr, 
PHARMACY VANCOMYCIN MONITORING SERVICE  Pharmacy consulted by Dr. Stuart for monitoring and adjustment.    Indication for treatment: Vancomycin indication: Bone/Joint infection   Goal trough: Trough Goal: 15-20 mcg/mL  AUC/PAMELA: 400-600    Risk Factors for MRSA Identified:   Hospitalization within the past 90 days, Patient on hemodialysis, Received IV antibiotics within the past 90 days    Pertinent Laboratory Values:   Temp Readings from Last 3 Encounters:   06/11/24 97.9 °F (36.6 °C)   06/04/24 98.2 °F (36.8 °C) (Temporal)   06/02/24 98.2 °F (36.8 °C) (Oral)     Recent Labs     06/09/24  0838 06/10/24  0608 06/11/24  0232   WBC 5.3 5.1 6.1       Recent Labs     06/09/24  0838 06/10/24  0608 06/11/24  0232   BUN 53* 73* 65*   CREATININE 5.7* 7.0* 6.7*       Estimated Creatinine Clearance: 17 mL/min (A) (based on SCr of 6.7 mg/dL (H)).    Intake/Output Summary (Last 24 hours) at 6/11/2024 1326  Last data filed at 6/11/2024 1207  Gross per 24 hour   Intake 1000 ml   Output 6604 ml   Net -5604 ml       Last Encounter Weight:  Wt Readings from Last 3 Encounters:   06/11/24 123 kg (271 lb 2.7 oz)   05/22/24 117.9 kg (260 lb)   04/26/24 123.2 kg (271 lb 9.7 oz)       Pertinent Cultures:   Date    Source    Results  04/23/24  Tissue    E. vaecium, C. striatum    Vancomycin level:   TROUGH:  No results for input(s): \"VANCOTROUGH\" in the last 72 hours.  RANDOM:    Recent Labs     06/09/24  0838 06/10/24  0608   VANCORANDOM 21.5 21.5         Assessment:  HPI: 49 year old male with pmh of hypertension, CAD, hyperlipidemia, diabetic foot ulcer and ESRD on HD presented to the ED from dialysis clinic with fluid overload. Patient had dialysis yesterday.  Nephrology plans to dialyze him again today. Patient was receiving vancomycin in an outpatient setting for 6 weeks for osteomyelitis due to ampicillin resistant Enterococcus faecium and Corynebacterium striatum, which was scheduled to end 6/4/24. However, patient states he had 
PHARMACY VANCOMYCIN MONITORING SERVICE  Pharmacy consulted by Dr. Stuart for monitoring and adjustment.    Indication for treatment: Vancomycin indication: Bone/Joint infection   Goal trough: Trough Goal: 15-20 mcg/mL  AUC/PAMELA: 400-600    Risk Factors for MRSA Identified:   Hospitalization within the past 90 days, Patient on hemodialysis, Received IV antibiotics within the past 90 days    Pertinent Laboratory Values:   Temp Readings from Last 3 Encounters:   06/12/24 98.1 °F (36.7 °C) (Oral)   06/04/24 98.2 °F (36.8 °C) (Temporal)   06/02/24 98.2 °F (36.8 °C) (Oral)     Recent Labs     06/10/24  0608 06/11/24  0232 06/12/24  0209   WBC 5.1 6.1 5.1       Recent Labs     06/10/24  0608 06/11/24  0232 06/12/24  0209   BUN 73* 65* 81*   CREATININE 7.0* 6.7* 7.6*       Estimated Creatinine Clearance: 15 mL/min (A) (based on SCr of 7.6 mg/dL (H)).    Intake/Output Summary (Last 24 hours) at 6/12/2024 1242  Last data filed at 6/12/2024 1045  Gross per 24 hour   Intake 360 ml   Output --   Net 360 ml       Last Encounter Weight:  Wt Readings from Last 3 Encounters:   06/12/24 125.5 kg (276 lb 10.8 oz)   05/22/24 117.9 kg (260 lb)   04/26/24 123.2 kg (271 lb 9.7 oz)       Pertinent Cultures:   Date    Source    Results  04/23/24  Tissue    E. vaecium, C. striatum    Vancomycin level:   TROUGH:  No results for input(s): \"VANCOTROUGH\" in the last 72 hours.  RANDOM:    Recent Labs     06/10/24  0608 06/12/24  0209   VANCORANDOM 21.5 18.0         Assessment:  HPI: 49 year old male with pmh of hypertension, CAD, hyperlipidemia, diabetic foot ulcer and ESRD on HD presented to the ED from dialysis clinic with fluid overload. Patient had dialysis yesterday.  Nephrology plans to dialyze him again today. Patient was receiving vancomycin in an outpatient setting for 6 weeks for osteomyelitis due to ampicillin resistant Enterococcus faecium and Corynebacterium striatum, which was scheduled to end 6/4/24. However, patient states he 
PHARMACY VANCOMYCIN MONITORING SERVICE  Pharmacy consulted by Dr. Stuart for monitoring and adjustment.    Indication for treatment: Vancomycin indication: Bone/Joint infection   Goal trough: Trough Goal: 15-20 mcg/mL  AUC/PAMELA: 400-600    Risk Factors for MRSA Identified:   Hospitalization within the past 90 days, Patient on hemodialysis, Received IV antibiotics within the past 90 days    Pertinent Laboratory Values:   Temp Readings from Last 3 Encounters:   06/13/24 98.1 °F (36.7 °C) (Oral)   06/04/24 98.2 °F (36.8 °C) (Temporal)   06/02/24 98.2 °F (36.8 °C) (Oral)     Recent Labs     06/11/24  0232 06/12/24  0209 06/13/24 0414   WBC 6.1 5.1 5.0       Recent Labs     06/11/24  0232 06/12/24  0209 06/13/24 0414   BUN 65* 81* 60*   CREATININE 6.7* 7.6* 6.7*       Estimated Creatinine Clearance: 17 mL/min (A) (based on SCr of 6.7 mg/dL (H)).    Intake/Output Summary (Last 24 hours) at 6/13/2024 1321  Last data filed at 6/13/2024 1118  Gross per 24 hour   Intake 7120 ml   Output 7400 ml   Net -280 ml       Last Encounter Weight:  Wt Readings from Last 3 Encounters:   06/12/24 125.5 kg (276 lb 10.8 oz)   05/22/24 117.9 kg (260 lb)   04/26/24 123.2 kg (271 lb 9.7 oz)       Pertinent Cultures:   Date    Source    Results  04/23/24  Tissue    E. vaecium, C. striatum    Vancomycin level:   TROUGH:  No results for input(s): \"VANCOTROUGH\" in the last 72 hours.  RANDOM:    Recent Labs     06/12/24  0209 06/13/24 0414   VANCORANDOM 18.0 15.9         Assessment:  HPI: 49 year old male with pmh of hypertension, CAD, hyperlipidemia, diabetic foot ulcer and ESRD on HD presented to the ED from dialysis clinic with fluid overload. Patient had dialysis yesterday.  Nephrology plans to dialyze him again today. Patient was receiving vancomycin in an outpatient setting for 6 weeks for osteomyelitis due to ampicillin resistant Enterococcus faecium and Corynebacterium striatum, which was scheduled to end 6/4/24. However, patient 
Patient Name: Yovanny Levine  Patient : 1975  MRN: [id number]     Acct: [id number]  Date of Admission: 2024  Room/Bed: 3130/3130-A  Code Status:  Full Code  Allergies:   Allergies   Allergen Reactions    Adhesive Tape Rash    Doxycycline Nausea And Vomiting    Reglan [Metoclopramide] Anxiety     Diagnosis:    Patient Active Problem List   Diagnosis    Hiatal hernia    Diabetes mellitus type 2, improved controlled    Diabetic neuropathy (HCC)    Panic attack    Anxiety associated with depression    Neck pain    DANIELA (obstructive sleep apnea)    Urinary frequency    Bilateral carpal tunnel syndrome- left worse than right    Hyperlipidemia with target LDL less than 100    Essential hypertension    Bronchitis    Osteomyelitis (HCC)    Abscess    Periumbilical hernia    Sepsis (HCC)    Cellulitis of left foot    Constipation    Intractable nausea and vomiting    Uncontrolled type 2 diabetes mellitus    Nausea and vomiting    Diabetic foot infection (HCC)    Acute renal failure (ARF) (HCC)    Cellulitis    Cellulitis of left arm    Cellulitis, scrotum    Acute epididymitis    Irene gangrene    Recurrent major depressive disorder, in full remission (HCC)    Generalized anxiety disorder    Morbid obesity with body mass index (BMI) of 40.0 to 44.9 in adult (HCC)    Necrotizing fasciitis (HCC)    Syncope    Right groin wound    Ulcer of right groin with fat layer exposed (HCC)    Diabetic ulcer of left midfoot associated with type 2 diabetes mellitus (HCC)    Nephrotic syndrome    Generalized edema    Stage 3b chronic kidney disease (HCC)    Diabetic nephropathy associated with type 2 diabetes mellitus (HCC)    Hypoalbuminemia    Chronic kidney disease, stage IV (severe) (HCC)    FH: CAD (coronary artery disease)    ASCVD (arteriosclerotic cardiovascular disease)    Other proteinuria    Chest pain    Surgical wound dehiscence    CARMEN (acute kidney injury) (HCC)    Anemia    Chest tightness    NSTEMI (non-ST 
Patient Name: Yovanny Levine  Patient : 1975  MRN: [id number]     Acct: [id number]  Date of Admission: 2024  Room/Bed: 3130/3130-A  Code Status:  Full Code  Allergies:   Allergies   Allergen Reactions    Adhesive Tape Rash    Doxycycline Nausea And Vomiting    Reglan [Metoclopramide] Anxiety     Diagnosis:    Patient Active Problem List   Diagnosis    Hiatal hernia    Diabetes mellitus type 2, improved controlled    Diabetic neuropathy (HCC)    Panic attack    Anxiety associated with depression    Neck pain    DANIELA (obstructive sleep apnea)    Urinary frequency    Bilateral carpal tunnel syndrome- left worse than right    Hyperlipidemia with target LDL less than 100    Essential hypertension    Bronchitis    Osteomyelitis (HCC)    Abscess    Periumbilical hernia    Sepsis (HCC)    Cellulitis of left foot    Constipation    Intractable nausea and vomiting    Uncontrolled type 2 diabetes mellitus    Nausea and vomiting    Diabetic foot infection (HCC)    Acute renal failure (ARF) (HCC)    Cellulitis    Cellulitis of left arm    Cellulitis, scrotum    Acute epididymitis    Irene gangrene    Recurrent major depressive disorder, in full remission (HCC)    Generalized anxiety disorder    Morbid obesity with body mass index (BMI) of 40.0 to 44.9 in adult (HCC)    Necrotizing fasciitis (HCC)    Syncope    Right groin wound    Ulcer of right groin with fat layer exposed (HCC)    Diabetic ulcer of left midfoot associated with type 2 diabetes mellitus (HCC)    Nephrotic syndrome    Generalized edema    Stage 3b chronic kidney disease (HCC)    Diabetic nephropathy associated with type 2 diabetes mellitus (HCC)    Hypoalbuminemia    Chronic kidney disease, stage IV (severe) (HCC)    FH: CAD (coronary artery disease)    ASCVD (arteriosclerotic cardiovascular disease)    Other proteinuria    Chest pain    Surgical wound dehiscence    CARMEN (acute kidney injury) (HCC)    Anemia    Chest tightness    NSTEMI (non-ST 
Patient Name: Yovanny Levine  Patient : 1975  MRN: [id number]     Acct: [id number]  Date of Admission: 2024  Room/Bed: 3130/3130-A  Code Status:  Full Code  Allergies:   Allergies   Allergen Reactions    Adhesive Tape Rash    Doxycycline Nausea And Vomiting    Reglan [Metoclopramide] Anxiety     Diagnosis:    Patient Active Problem List   Diagnosis    Hiatal hernia    Diabetes mellitus type 2, improved controlled    Diabetic neuropathy (HCC)    Panic attack    Anxiety associated with depression    Neck pain    DANIELA (obstructive sleep apnea)    Urinary frequency    Bilateral carpal tunnel syndrome- left worse than right    Hyperlipidemia with target LDL less than 100    Essential hypertension    Bronchitis    Osteomyelitis (HCC)    Abscess    Periumbilical hernia    Sepsis (HCC)    Cellulitis of left foot    Constipation    Intractable nausea and vomiting    Uncontrolled type 2 diabetes mellitus    Nausea and vomiting    Diabetic foot infection (HCC)    Acute renal failure (ARF) (HCC)    Cellulitis    Cellulitis of left arm    Cellulitis, scrotum    Acute epididymitis    Irene gangrene    Recurrent major depressive disorder, in full remission (HCC)    Generalized anxiety disorder    Morbid obesity with body mass index (BMI) of 40.0 to 44.9 in adult (HCC)    Necrotizing fasciitis (HCC)    Syncope    Right groin wound    Ulcer of right groin with fat layer exposed (HCC)    Diabetic ulcer of left midfoot associated with type 2 diabetes mellitus (HCC)    Nephrotic syndrome    Generalized edema    Stage 3b chronic kidney disease (HCC)    Diabetic nephropathy associated with type 2 diabetes mellitus (HCC)    Hypoalbuminemia    Chronic kidney disease, stage IV (severe) (HCC)    FH: CAD (coronary artery disease)    ASCVD (arteriosclerotic cardiovascular disease)    Other proteinuria    Chest pain    Surgical wound dehiscence    CARMEN (acute kidney injury) (HCC)    Anemia    Chest tightness    NSTEMI (non-ST 
Physical Therapy    Physical Therapy Treatment Note  Name: Yovanny Levine MRN: [id number] :   1975   Date:  2024   Admission Date: 2024 Room:  [address] [place] [address]-A     PT session attempted: Pt. Declines skilled services at this time d/t nausea. Pt. States he started feeling this way after dialysis and has been trying not to take medication for it. Pt. Request therapy return in an hour or 2. PT will f/u as schedule allow.     Electronically signed by:    Jules Gómez PTA  2024, 1:54 PM      
Pt c/o nausea prn zofran given as ordered  
Report called to [facility] spoke with nurse Sims.  Medicated pt with percocet for comfort. Educated pt on SNF  administering medications and his blood glucose monitoring.  ETA for transport 1600  1645 [facility] here . [facility] taken to [facility] .  
    Goals:     Goals: Meet at least 75% of estimated needs       Nutrition Monitoring and Evaluation:   Behavioral-Environmental Outcomes: None Identified  Food/Nutrient Intake Outcomes: Food and Nutrient Intake  Physical Signs/Symptoms Outcomes: Biochemical Data, Skin, Weight    Discharge Planning:    No discharge needs at this time     Nicole Langley RD, LD  Contact: [phone]      
(coronary artery disease)    ASCVD (arteriosclerotic cardiovascular disease)    Other proteinuria    Chest pain    Surgical wound dehiscence    CARMEN (acute kidney injury) (HCC)    Anemia    Chest tightness    NSTEMI (non-ST elevated myocardial infarction) (HCC)    Diabetic foot ulcer with osteomyelitis (HCC)    ESRD (end stage renal disease) (HCC)    Problem with vascular access    Fluid overload    Acute on chronic respiratory failure with hypoxia (HCC)    CAD (coronary artery disease)    Volume overload    Gangrene (HCC)    Acute osteomyelitis of metatarsal bone of left foot (HCC)    Open wound of plantar aspect of left foot    Diabetic ulcer of left midfoot associated with diabetes mellitus due to underlying condition, with fat layer exposed (HCC)    Open wound of foot, left, sequela    Chronic foot pain, left    Absence of toe of left foot (HCC)    Hyperkalemia    ESRD (end stage renal disease) on dialysis (HCC)    Presence of surgical incision    Diabetic ulcer of toe of right foot associated with type 2 diabetes mellitus, with necrosis of muscle (HCC)    Diabetic ulcer of midfoot associated with type 2 diabetes mellitus, with muscle involvement without evidence of necrosis (HCC)         Treatment:  Hemodilaysis 2:1  Priority: Routine  Location: Acute Room    Diabetic: Yes  NPO: No  Isolation Precautions: Dialysis     Consent for Treatment Verified: Yes  Blood Consent Verified: Not Applicable     Safety Verified: Identify (I), Consent (C), Equipment (E), HepB Status (B), Orders Complete (O), Access Verified (A), and Timeliness (T)  Time out performed prior to access at 0740 hours.    Report Received from Primary RN at 0715 hours.  Primary RN (First Initial, Last Name, Title): lety ribeiro  Incapacitated Nurse Education Completed: Yes     HBsAg ONLY:  Date Drawn: October 12, 2024       Results: Negative  HBsAb:  Date Drawn:  October 12, 2024       Results: Immune >10    Order  Dialysis Bath  K+ (Potassium): 3  Ca+ 
IntraVENous 2 times per day    heparin (porcine)  5,000 Units SubCUTAneous 3 times per day    amLODIPine  5 mg Oral Daily    atorvastatin  40 mg Oral Nightly    calcitRIOL  0.5 mcg Oral BID    calcium carbonate  2 tablet Oral TID     carvedilol  25 mg Oral BID     hydrALAZINE  25 mg Oral 3 times per day    insulin glargine  10 Units SubCUTAneous Nightly    metroNIDAZOLE  500 mg Oral TID    Vitamin D  5,000 Units Oral Daily    insulin lispro  0-8 Units SubCUTAneous TID     insulin lispro  0-4 Units SubCUTAneous Nightly      Infusions:    sodium chloride      dextrose       PRN Meds: sodium chloride flush, 5-40 mL, PRN  sodium chloride, , PRN  ondansetron, 4 mg, Q8H PRN   Or  ondansetron, 4 mg, Q6H PRN  polyethylene glycol, 17 g, Daily PRN  acetaminophen, 650 mg, Q6H PRN   Or  acetaminophen, 650 mg, Q6H PRN  albuterol sulfate HFA, 2 puff, 4x Daily PRN  oxyCODONE-acetaminophen, 1 tablet, Q8H PRN  glucose, 4 tablet, PRN  dextrose bolus, 125 mL, PRN   Or  dextrose bolus, 250 mL, PRN  glucagon (rDNA), 1 mg, PRN  dextrose, , Continuous PRN        Labs and Imaging   Echo (TTE) complete (PRN contrast/bubble/strain/3D)    Result Date: 6/10/2024    Definity was utilized.   Left Ventricle: Normal left ventricular systolic function with a visually estimated EF of 55-60%. Left ventricle size is normal. Mildly increased wall thickness. Inferior wall appears slightly hypokinetic. Indeterminate diastolic function.   Left Atrium: Left atrium is mildly dilated.   Tricuspid Valve: Mild regurgitation. The estimated RVSP is 48 mmHg.   Pericardium: There is evidence of epicardial fat. Physiologic amount of pericardial fluid noted.   Dilated IVC with poor inspiratory collapse.   Extracardiac: Left pleural effusion.     XR CHEST PORTABLE    Result Date: 6/7/2024  Chest X-ray INDICATION: Shortness of breath COMPARISON:  None TECHNIQUE: AP/PA view of the chest was obtained. FINDINGS: Left lower lung infiltrate and small left pleural 
      Safety - Before each treatment:   Dialysis Machine No.: [id number]  RO Machine Number: [id number]  Dialyzer Lot No.: [id number]  RO Machine Log Sheet Completed: Yes  Machine Alarm Self Test: Completed;Passed (06/13/24 0640)     Air Foam Detector: Tested, Proper Function, pH Reading  Extracorporeal Circuit Tested for Integrity: Yes  Machine Conductivity: 14  Manual Conductivity: 14  Machine Ph: 7.2  Bicarbonate Concentrate Lot No.: [id number]  Acid Concentrate Lot No.: [id number]  Manual Ph: 7.2  Bleach Test (Neg): Yes  Bath Temperature: 96.8 °F (36 °C)  Tubing Lot#: [id number]  Conductivity Meter Serial #: [id number]  All Connections Secure?: Yes  Venous Parameters Set?: Yes  Arterial Parameters Set?: Yes  Saline Line Double Clamped?: Yes  Air Foam Detector Engaged?: Yes  Machine Functioning Alarm Free? Yes  Prime Given: 200ml    Chlorine Testing - Before each treatment and every 4 hours:   Treatment  Treatment Number: 2  Time On: 0747  Time Off: 1118  Treatment Goal: 3.5 HOUR, 4.0L  Weight - Scale: 125.5 kg (276 lb 10.8 oz) (06/12/24 0434)  1st check: less than 0.1 ppm at: 0700 hours  2nd check: less than 0.1 ppm at: 1050 hours  3rd check: Not Applicable  (if greater than 0.1 ppm, then check every 30 minutes from secondary)    Access Flows and Pressures  Patient Vitals for the past 8 hrs:   Blood Flow Rate (ml/min) Ultrafiltration Rate (ml/hr) Ultrafiltration Removed (ml) Arterial Pressure (mmHg) Venous Pressure (mmHg) TMP DFR Comments Access Visible   06/13/24 0747 300 ml/min 1060 ml/hr 0 ml -10 mmHg 70 50 700 tx intiated Yes   06/13/24 0800 300 ml/min 1060 ml/hr 271 ml -30 mmHg 80 60 700 resting Yes   06/13/24 0815 300 ml/min 1060 ml/hr 547 ml -20 mmHg 80 60 700 no needs Yes   06/13/24 0830 300 ml/min 1060 ml/hr 930 ml -30 mmHg 80 70 700 resting quietly Yes   06/13/24 0845 300 ml/min 1060 ml/hr 1050 ml -30 mmHg 80 70 700 no changes Yes   06/13/24 0900 300 ml/min 1060 ml/hr 1296 ml -30 mmHg 90 70 700 bicarb changed Yes 
COMPARISON:  None TECHNIQUE: AP/PA view of the chest was obtained. FINDINGS: Left lower lung infiltrate and small left pleural effusion seen. Cardiac silhouette enlarged..  The heart size is mildly enlarged.  The bony thorax is intact.      1. As above. Electronically signed by Estefany Ricks      CBC:   Recent Labs     06/12/24 0209 06/13/24 0414 06/14/24 0258   WBC 5.1 5.0 5.0   HGB 9.4* 8.6* 9.3*    153 157     BMP:    Recent Labs     06/12/24  0209 06/13/24 0414 06/14/24  0258    140 139   K 5.3* 4.9 5.1   CL 96* 100 98*   CO2 21 27 26   BUN 81* 60* 49*   CREATININE 7.6* 6.7* 5.9*   GLUCOSE 128* 178* 156*     Hepatic: No results for input(s): \"AST\", \"ALT\", \"BILITOT\", \"ALKPHOS\" in the last 72 hours.    Invalid input(s): \"ALB\"  Lipids:   Lab Results   Component Value Date/Time    CHOL 166 09/22/2022 02:02 PM    CHOL 168 10/15/2013 10:30 AM    HDL 30 09/22/2022 02:02 PM    TRIG 254 09/22/2022 02:02 PM     Hemoglobin A1C:   Lab Results   Component Value Date/Time    LABA1C 5.5 10/11/2023 05:42 PM     TSH: No results found for: \"TSH\"  Troponin:   Lab Results   Component Value Date/Time    TROPONINT 0.176 02/08/2023 05:45 AM    TROPONINT 0.183 02/07/2023 09:17 PM    TROPONINT 0.155 09/13/2022 11:49 AM     Lactic Acid: No results for input(s): \"LACTA\" in the last 72 hours.  BNP: No results for input(s): \"PROBNP\" in the last 72 hours.  UA:  Lab Results   Component Value Date/Time    NITRU NEGATIVE 08/30/2023 02:00 AM    COLORU YELLOW 08/30/2023 02:00 AM    PHUR 7.0 08/30/2023 02:00 AM    PHUR 6.5 06/20/2013 09:51 AM    WBCUA 2 08/30/2023 02:00 AM    RBCUA <1 08/30/2023 02:00 AM    MUCUS RARE 07/24/2022 11:40 PM    TRICHOMONAS NONE SEEN 08/30/2023 02:00 AM    BACTERIA NEGATIVE 08/30/2023 02:00 AM    CLARITYU CLEAR 08/30/2023 02:00 AM    SPECGRAV 1.025 06/20/2013 09:51 AM    LEUKOCYTESUR NEGATIVE 08/30/2023 02:00 AM    UROBILINOGEN 1.0 08/30/2023 02:00 AM    BILIRUBINUR NEGATIVE 08/30/2023 02:00 AM    
effusion seen. Cardiac silhouette enlarged..  The heart size is mildly enlarged.  The bony thorax is intact.      1. As above. Electronically signed by Estefany Ricks      CBC:   Recent Labs     06/14/24 0258   WBC 5.0   HGB 9.3*        BMP:    Recent Labs     06/14/24 0258      K 5.1   CL 98*   CO2 26   BUN 49*   CREATININE 5.9*   GLUCOSE 156*     Hepatic: No results for input(s): \"AST\", \"ALT\", \"BILITOT\", \"ALKPHOS\" in the last 72 hours.    Invalid input(s): \"ALB\"  Lipids:   Lab Results   Component Value Date/Time    CHOL 166 09/22/2022 02:02 PM    CHOL 168 10/15/2013 10:30 AM    HDL 30 09/22/2022 02:02 PM    TRIG 254 09/22/2022 02:02 PM     Hemoglobin A1C:   Lab Results   Component Value Date/Time    LABA1C 5.5 10/11/2023 05:42 PM     TSH: No results found for: \"TSH\"  Troponin:   Lab Results   Component Value Date/Time    TROPONINT 0.176 02/08/2023 05:45 AM    TROPONINT 0.183 02/07/2023 09:17 PM    TROPONINT 0.155 09/13/2022 11:49 AM     Lactic Acid: No results for input(s): \"LACTA\" in the last 72 hours.  BNP: No results for input(s): \"PROBNP\" in the last 72 hours.  UA:  Lab Results   Component Value Date/Time    NITRU NEGATIVE 08/30/2023 02:00 AM    COLORU YELLOW 08/30/2023 02:00 AM    PHUR 7.0 08/30/2023 02:00 AM    PHUR 6.5 06/20/2013 09:51 AM    WBCUA 2 08/30/2023 02:00 AM    RBCUA <1 08/30/2023 02:00 AM    MUCUS RARE 07/24/2022 11:40 PM    TRICHOMONAS NONE SEEN 08/30/2023 02:00 AM    BACTERIA NEGATIVE 08/30/2023 02:00 AM    CLARITYU CLEAR 08/30/2023 02:00 AM    SPECGRAV 1.025 06/20/2013 09:51 AM    LEUKOCYTESUR NEGATIVE 08/30/2023 02:00 AM    UROBILINOGEN 1.0 08/30/2023 02:00 AM    BILIRUBINUR NEGATIVE 08/30/2023 02:00 AM    BILIRUBINUR neg 06/20/2013 09:51 AM    BLOODU TRACE 08/30/2023 02:00 AM    GLUCOSEU 250 08/30/2023 02:00 AM    GLUCOSEU 1,000 06/20/2013 09:51 AM    KETUA NEGATIVE 08/30/2023 02:00 AM    AMORPHOUS RARE 07/24/2022 11:40 PM     Urine Cultures: No results found for: 
heart failure, unspecified heart failure type (HCC), Dyspnea, unspecified type, Acute respiratory failure, unspecified whether with hypoxia or hypercapnia (HCC), Osteomyelitis of left foot, unspecified type (HCC), CKD (chronic kidney disease) stage V requiring chronic dialysis (HCC), Pneumonia of left lower lobe due to infectious organism, Cardiomyopathy, unspecified type (HCC), and Chest pain, unspecified type were also pertinent to this visit.. Pt at baseline is independent with ADLs and modified independent with functional transfers/mobility using SPC. Pt currently presents w/ deficits relating to ADLs, IADLs, UE strength/ROM, functional activity tolerance, safety awareness, and functional mobility. Pt would benefit from continued acute care OT services     Complexity: Moderate   Prognosis: Good, no significant barriers to participation at this time.   Occupational Therapy Plan  Times Per Week: 3-5x wk  Times Per Day: Once a day  Current Treatment Recommendations: Strengthening, Balance training, Functional mobility training, Endurance training, Gait training, Neuromuscular re-education, Pain management, Safety education & training, Patient/Caregiver education & training, Equipment evaluation, education, & procurement, Positioning, Self-Care / ADL, Home management training       Goals:  - Pt will perform UE ADLs with Mod I  using AE PRN by d/c  - Pt will perform LE ADLs with Mod I  using AE PRN by d/c  - Pt will perform toileting with Mod I using AE PRN by d/c  - Pt will perform HH distance functional mobility with Mod I  using DME PRN in preparation for return to home   - Pt will perform functional transfers to/from bed, chair, and toilet with Mod I using AE PRN by d/c  - Pt will perform therex/theract in order to increase functional activity tolerance    Treatment plan:  Pt will perform therex/theract in order to increase functional activity tolerance in preparation for ADL participation.     Recommendations 
states \"fluid overload, sob, lightheaded\"     Please review addendum/changes made to note above   Interval history: He is feeling better.  No further ventricular tachycardia noted.    Physical Exam:  General:  Awake, alert, NAD  Head:normal  Eye:normal  Neck:  No JVD   Chest:  Clear to auscultation, respiration easy  Cardiovascular: Regular pulse   Abdomen:   nontender  Extremities: No edema  Pulses; palpable  Neuro: grossly normal        I agree with the plan, which was planned by myself and discussed with advanced level provider.  My documented MDM is a substantive portion of the supervisory note.    I have seen ,spoken to  and examined this patient personally, independently of the advanced level provider.  I have spent substantiate  portion of this encounter independently myself in examining patient and developing the medical management plan . I have reviewed the hospital care given to date and reviewed all pertinent labs and imaging.The plan was developed mutually at the time of the visit with the patient,  NP /PA  and myself. I have spoken with patient, nursing staff and provided written and verbal instructions .The above note has been reviewed and I agree with the assessment, diagnosis, and treatment plan with changes made by me as follows .    Sherwin George MD    
3500 ml     Tolerated Treatment: Good  Patient Response to Treatment: well  Physician Notified: No       Provider Notification  Provider Notification  Reason for Communication: Evaluate (CLOTTED SYSTEM, 45 MINUTES LEFT IN TREATMENT) (06/10/24 1600)  Provider Name: EDON (06/10/24 1600)  Provider Notification: Physician (06/10/24 1600)  Method of Communication: Call (06/10/24 1600)  Response: Waiting for response (06/10/24 1600)     Handoff complete and report given to Primary RN at 1125 hours.  Primary RN (First Initial, Last Name, Title):  CARMELA Beasley RN     Education  Person Educated: Patient   Knowledge Base: Substantial  Barriers to Learning?: None  Preferred method of Learning: Oral  Topic(s): Access Care, Signs and Symptoms of Infection, Fluid Management, Albumin, Procedural, Medications, Treatment Options, Potassium, Diet, and Transplant   Teaching Tools: Explanation   Response to Education: Verbalized Understanding and Requires Follow-up     Electronically signed by Lola Marx RN on 6/14/2024 at 11:25 AM  
Treatment: TOLERATED WELL  Physician Notified: Yes       Provider Notification  Provider Notification  Reason for Communication: Evaluate (CLOTTED SYSTEM, 45 MINUTES LEFT IN TREATMENT) (06/10/24 1600)  Provider Name: EDON (06/10/24 1600)  Provider Notification: Physician (06/10/24 1600)  Method of Communication: Call (06/10/24 1600)  Response: Waiting for response (06/10/24 1600)     Handoff complete and report given to Primary RN at 1612 hours.  Primary RN (First Initial, Last Name, Title):  TOBIAS Gomez RN      Education  Person Educated: Patient   Knowledge Base: Substantial  Barriers to Learning?: None  Preferred method of Learning: Oral  Topic(s): Access Care, Signs and Symptoms of Infection, and Procedural   Teaching Tools: Explanation   Response to Education: Verbalized Understanding     Electronically signed by Marina Jenkins RN on 6/10/2024 at 4:13 PM

## 2024-06-17 NOTE — CARE COORDINATION
Precert has been APPROVED via [employer] Authorization Number: [id number] 12:13 PM    Effective From:  06/15/2024  Effective Through:  2024    Electronic PAS completed        Received call with SNF denial and need for [employer]:   Provider can call [employer] at [phone], will need patient name/ / Member ID [id number], Authorization Number: [id number]   Deadline to call: 2024 @ 2:20pm.     Sent PS to Dr. David to update. 11:25 AM     Per "[employer]" portal precert remains pending at this time 9:15 AM

## 2024-06-17 NOTE — DISCHARGE INSTR - COC
Continuity of Care Form    Patient Name: Yovanny Levine   :  1975  MRN:  [id number]    Admit date:  2024  Discharge date:  ***    Code Status Order: Full Code   Advance Directives:     Admitting Physician:  Valeria Stuart MD  PCP: José Luis Izquierdo MD    Discharging Nurse: ***  Discharging Hospital Unit/Room#: 3130/3130-A  Discharging Unit Phone Number: ***    Emergency Contact:   Extended Emergency Contact Information  Primary Emergency Contact: Yovanny Levine  Mobile Phone: [phone]  Relation: Child  Preferred language: English   needed? No  Secondary Emergency Contact: Magui Levine (Do Not Contact) Medical POA  Address: [address] [place] [address]  Home Phone: [phone]  Mobile Phone: [phone]  Relation: Spouse    Past Surgical History:  Past Surgical History:   Procedure Laterality Date    ABDOMEN SURGERY      CARDIAC CATHETERIZATION  ,     Normal (Dr. Winslow)    COLONOSCOPY  2011    Pandiverticulosis, Nonbleeding internal hemorrhoids, Repeat colonoscopy at age 50- Dr. Medina    CORONARY STENT PLACEMENT      x 3    FOOT DEBRIDEMENT Left 2023    FOOT DEBRIDEMENT INCISION AND DRAINAGE performed by Sammy Hsieh MD at [facility] OR    FOOT DEBRIDEMENT Left 2024    FOOT DEBRIDEMENT INCISION AND DRAINAGE performed by Claudy Forrest DO at [facility] OR    FOOT SURGERY Left 2021    EXCISION OF HEAD LEFT 2ND METATARSAL performed by Andrzej Webber DPM at [facility] OR    IR NONTUNNELED VASCULAR CATHETER  2022    IR NONTUNNELED VASCULAR CATHETER 2022 [facility] SPECIAL PROCEDURES    IR TUNNELED CATHETER PLACEMENT GREATER THAN 5 YEARS  2022    IR TUNNELED CATHETER PLACEMENT GREATER THAN 5 YEARS 2022 [facility] SPECIAL PROCEDURES    IR TUNNELED CATHETER PLACEMENT GREATER THAN 5 YEARS  2022    IR TUNNELED CATHETER PLACEMENT GREATER THAN 5 YEARS 2022 [facility] SPECIAL PROCEDURES    NOSE SURGERY

## 2024-06-25 ENCOUNTER — HOSPITAL ENCOUNTER (OUTPATIENT)
Dept: WOUND CARE | Age: 49
Discharge: HOME | End: 2024-06-25
Attending: STUDENT IN AN ORGANIZED HEALTH CARE EDUCATION/TRAINING PROGRAM
Payer: MEDICARE

## 2024-06-25 VITALS
SYSTOLIC BLOOD PRESSURE: 173 MMHG | RESPIRATION RATE: 16 BRPM | HEART RATE: 76 BPM | TEMPERATURE: 97.9 F | DIASTOLIC BLOOD PRESSURE: 80 MMHG

## 2024-06-25 DIAGNOSIS — Z99.2 ESRD (END STAGE RENAL DISEASE) ON DIALYSIS (HCC): ICD-10-CM

## 2024-06-25 DIAGNOSIS — M79.672 CHRONIC FOOT PAIN, LEFT: ICD-10-CM

## 2024-06-25 DIAGNOSIS — E66.01 MORBID OBESITY WITH BODY MASS INDEX (BMI) OF 40.0 TO 44.9 IN ADULT (HCC): ICD-10-CM

## 2024-06-25 DIAGNOSIS — L97.425 DIABETIC ULCER OF LEFT MIDFOOT ASSOCIATED WITH TYPE 2 DIABETES MELLITUS, WITH MUSCLE INVOLVEMENT WITHOUT EVIDENCE OF NECROSIS (HCC): ICD-10-CM

## 2024-06-25 DIAGNOSIS — Z89.422 ABSENCE OF TOE OF LEFT FOOT (HCC): Primary | ICD-10-CM

## 2024-06-25 DIAGNOSIS — E11.621 DIABETIC ULCER OF LEFT MIDFOOT ASSOCIATED WITH TYPE 2 DIABETES MELLITUS, WITH MUSCLE INVOLVEMENT WITHOUT EVIDENCE OF NECROSIS (HCC): ICD-10-CM

## 2024-06-25 DIAGNOSIS — N18.6 ESRD (END STAGE RENAL DISEASE) ON DIALYSIS (HCC): ICD-10-CM

## 2024-06-25 DIAGNOSIS — G89.29 CHRONIC FOOT PAIN, LEFT: ICD-10-CM

## 2024-06-25 PROCEDURE — 97605 NEG PRS WND THER DME<=50SQCM: CPT

## 2024-06-25 PROCEDURE — 11042 DBRDMT SUBQ TIS 1ST 20SQCM/<: CPT

## 2024-06-25 PROCEDURE — 11042 DBRDMT SUBQ TIS 1ST 20SQCM/<: CPT | Performed by: NURSE PRACTITIONER

## 2024-06-25 PROCEDURE — 11045 DBRDMT SUBQ TISS EACH ADDL: CPT

## 2024-06-25 PROCEDURE — 11045 DBRDMT SUBQ TISS EACH ADDL: CPT | Performed by: NURSE PRACTITIONER

## 2024-06-25 RX ORDER — OXYCODONE AND ACETAMINOPHEN 7.5; 325 MG/1; MG/1
1 TABLET ORAL EVERY 8 HOURS PRN
Qty: 21 TABLET | Refills: 0 | Status: SHIPPED | OUTPATIENT
Start: 2024-06-25 | End: 2024-06-25 | Stop reason: SDUPTHER

## 2024-06-25 RX ORDER — SODIUM CHLOR/HYPOCHLOROUS ACID 0.033 %
SOLUTION, IRRIGATION IRRIGATION ONCE
OUTPATIENT
Start: 2024-06-25 | End: 2024-06-25

## 2024-06-25 RX ORDER — LIDOCAINE HYDROCHLORIDE 20 MG/ML
JELLY TOPICAL ONCE
OUTPATIENT
Start: 2024-06-25 | End: 2024-06-25

## 2024-06-25 RX ORDER — BACITRACIN ZINC 500 [USP'U]/G
OINTMENT TOPICAL ONCE
OUTPATIENT
Start: 2024-06-25 | End: 2024-06-25

## 2024-06-25 RX ORDER — TRIAMCINOLONE ACETONIDE 1 MG/G
OINTMENT TOPICAL ONCE
OUTPATIENT
Start: 2024-06-25 | End: 2024-06-25

## 2024-06-25 RX ORDER — BACITRACIN ZINC AND POLYMYXIN B SULFATE 500; 1000 [USP'U]/G; [USP'U]/G
OINTMENT TOPICAL ONCE
OUTPATIENT
Start: 2024-06-25 | End: 2024-06-25

## 2024-06-25 RX ORDER — LIDOCAINE 40 MG/G
CREAM TOPICAL ONCE
OUTPATIENT
Start: 2024-06-25 | End: 2024-06-25

## 2024-06-25 RX ORDER — IBUPROFEN 200 MG
TABLET ORAL ONCE
OUTPATIENT
Start: 2024-06-25 | End: 2024-06-25

## 2024-06-25 RX ORDER — BETAMETHASONE DIPROPIONATE 0.5 MG/G
CREAM TOPICAL ONCE
OUTPATIENT
Start: 2024-06-25 | End: 2024-06-25

## 2024-06-25 RX ORDER — OXYCODONE AND ACETAMINOPHEN 7.5; 325 MG/1; MG/1
1 TABLET ORAL EVERY 8 HOURS PRN
Qty: 21 TABLET | Refills: 0 | Status: SHIPPED | OUTPATIENT
Start: 2024-06-25 | End: 2024-07-02

## 2024-06-25 RX ORDER — LIDOCAINE 50 MG/G
OINTMENT TOPICAL ONCE
OUTPATIENT
Start: 2024-06-25 | End: 2024-06-25

## 2024-06-25 RX ORDER — SULFAMETHOXAZOLE AND TRIMETHOPRIM 400; 80 MG/1; MG/1
1 TABLET ORAL 2 TIMES DAILY
Qty: 40 TABLET | Refills: 0 | Status: SHIPPED | OUTPATIENT
Start: 2024-06-25 | End: 2024-07-15

## 2024-06-25 RX ORDER — CLOBETASOL PROPIONATE 0.5 MG/G
OINTMENT TOPICAL ONCE
OUTPATIENT
Start: 2024-06-25 | End: 2024-06-25

## 2024-06-25 RX ORDER — GENTAMICIN SULFATE 1 MG/G
OINTMENT TOPICAL ONCE
OUTPATIENT
Start: 2024-06-25 | End: 2024-06-25

## 2024-06-25 RX ORDER — LIDOCAINE HYDROCHLORIDE 40 MG/ML
SOLUTION TOPICAL ONCE
OUTPATIENT
Start: 2024-06-25 | End: 2024-06-25

## 2024-06-25 ASSESSMENT — PAIN DESCRIPTION - ONSET: ONSET: ON-GOING

## 2024-06-25 ASSESSMENT — PAIN DESCRIPTION - DESCRIPTORS: DESCRIPTORS: SHARP;STABBING

## 2024-06-25 ASSESSMENT — PAIN DESCRIPTION - ORIENTATION: ORIENTATION: LEFT

## 2024-06-25 ASSESSMENT — PAIN DESCRIPTION - LOCATION: LOCATION: FOOT

## 2024-06-25 ASSESSMENT — PAIN SCALES - GENERAL: PAINLEVEL_OUTOF10: 9

## 2024-06-25 ASSESSMENT — PAIN - FUNCTIONAL ASSESSMENT: PAIN_FUNCTIONAL_ASSESSMENT: PREVENTS OR INTERFERES SOME ACTIVE ACTIVITIES AND ADLS

## 2024-06-25 ASSESSMENT — PAIN DESCRIPTION - FREQUENCY: FREQUENCY: CONTINUOUS

## 2024-06-25 NOTE — PROGRESS NOTES
Negative Pressure Wound Therapy    NAME:  Yovanny Levine  YOB: 1975  MEDICAL RECORD NUMBER:  [id number]  DATE:  6/25/2024    Applied Negative Pressure to left amp site.  [x] Applied skin barrier prep to mao-wound.   [x] Cut strips of plastic drape to picture frame wound so that mao-wound is     covered with the drape.   [x] If bridging dressing to less prominent site, cover any intact skin that will come in contact with the Negative Pressure Therapy sponge, gauze or channel drain with plastic drape. The sponge should never touch intact skin.   [x] Cut sponge, gauze or channel drain to size which will fit into the wound/ulcer bed without being forced.   [x] Be sure the sponge is large enough to hold the entire round plastic flange which is attached to the tubing. Never allow flange to be larger than the sponge or it will produce suction damaging intact skin.  Total number of individual pieces of foam used within the wound bed: 2    [x] If bridging the dressing away from the primary site, be sure the bridge leads to a piece of sponge large enough to hold the entire flange without allowing any of the flange to overlap onto intact skin.   [x] Covered sponge, gauze or channel drain with plastic drape.   [x] Cut a hole in this plastic drape directly over the sponge the same size as the plastic drain tubing.   [x] Removed plastic liner from flange and apply it directly over the hole you cut.   [x] Removed the plastic cover from the flange.   [x] Attached the tubing to the wound/ulcer Negative Pressure Therapy and turn it on to be sure a vacuum is created and that there are no leaks.   [x] If air leaks occur, use plastic drape to patch them.   [x] Secured Negative Pressure Therapy dressing with ace wrap loosely if located on an extremity. Maintain tubing outside of ace wrap. Tubing must not exert pressure on intact skin.    Applied per  Guidelines      Electronically signed by Shila ALLAN 
(cm^2) 26.68 cm^2 06/25/24 1335   Change in Wound Size % (l*w) -37.88 06/25/24 1335   Wound Volume (cm^3) 66.7 cm^3 06/25/24 1335   Wound Healing % -331 06/25/24 1335   Post-Procedure Length (cm) 11.6 cm 06/25/24 1428   Post-Procedure Width (cm) 2.3 cm 06/25/24 1428   Post-Procedure Depth (cm) 2.5 cm 06/25/24 1428   Post-Procedure Surface Area (cm^2) 26.68 cm^2 06/25/24 1428   Post-Procedure Volume (cm^3) 66.7 cm^3 06/25/24 1428   Distance Tunneling (cm) 0 cm 06/25/24 1335   Tunneling Position ___ O'Clock 0 06/25/24 1335   Undermining Starts ___ O'Clock 600 06/25/24 1335   Undermining Ends___ O'Clock 900 06/25/24 1335   Undermining Maxium Distance (cm) 0.5 06/25/24 1335   Wound Assessment Pink/red;Slough 06/25/24 1335   Drainage Amount Moderate (25-50%) 06/25/24 1335   Drainage Description Serosanguinous;Brown;Sanguinous 06/25/24 1335   Odor Mild 06/25/24 1335   Shazia-wound Assessment Maceration;Hyperkeratosis (callous) 06/25/24 1335   Margins Defined edges 06/25/24 1335   Wound Thickness Description not for Pressure Injury Full thickness 06/25/24 1335   Number of days: 125     Total  Area  Debrided: 26 sq cm     Bleeding:  Minimal    Hemostasis Achieved:  by pressure    Procedural Pain:  0  / 10     Post Procedural Pain:  0 / 10     Response to treatment:  Well tolerated by patient.     Plan:     Patient Instructions   PHYSICIAN ORDERS AND DISCHARGE INSTRUCTIONS     Wound cleansing:              Do not scrub or use excessive force.             Wash hands with soap and water before and after dressing changes.             Prior to applying a clean dressing, cleanse wound with normal saline,               wound cleanser, or mild soap and water.              Ask the physician or nurse before getting the wound(s) wet in a shower                      Wound Care Notes:  Sees Dr Kumar.                                         Orders for this week: 6/25/2024     Fax to [facility]! Reapply wound vac if it becomes disconnected ASAP

## 2024-06-25 NOTE — PATIENT INSTRUCTIONS
PHYSICIAN ORDERS AND DISCHARGE INSTRUCTIONS     Wound cleansing:              Do not scrub or use excessive force.             Wash hands with soap and water before and after dressing changes.             Prior to applying a clean dressing, cleanse wound with normal saline,               wound cleanser, or mild soap and water.              Ask the physician or nurse before getting the wound(s) wet in a shower                      Wound Care Notes:  Sees Dr Kumar.                                         Orders for this week: 2024     Fax to [facility]! Reapply wound vac if it becomes disconnected ASAP even if macerated     Left Lateral Distal plantar (larger) wound: WOUND VAC THERAPY:  SKIN PREP OR MASTISOL, THEN DUODERM TO PERIWOUND FOR PROTECTION.   Bolster proximal wound together with drape after foam is placed   APPLY puracol ag in clinic (use stimulen powder at home)  on BLACK FOAM TO WOUND. SECURE VAC DRESSING WITH DRAPE.  SET WOUND VAC  CONTINUOUS SUCTION.   CANISTER CHANGE WEEKLY OR ACCORDING TO VOLUME OF DRAINAGE.  Wrap with Coflex Full, but do not compress vac tubing against skin.  WOUND VAC DRESSING TO BE CHANGED BY HOME CARE ON  AND SATURDAY (OR ).  WOUND CARE CENTER WILL CHANGE ON  (NEED TO BRING VAC SUPPLIES TO APPOINTMENTS - CANISTER AND BLACK FOAM SUCTION KIT).        Plan: To apply x2 crushed flagyl to wound bed when received         Follow Up Instructions: At the Wound Care Center in 1 week  Primary Wound Care Provider: Dr Forrest  Call  for any questions or concerns.  Central [phone] for imaging lab work

## 2024-07-02 ENCOUNTER — HOSPITAL ENCOUNTER (OUTPATIENT)
Dept: WOUND CARE | Age: 49
Discharge: HOME | End: 2024-07-02
Attending: STUDENT IN AN ORGANIZED HEALTH CARE EDUCATION/TRAINING PROGRAM
Payer: MEDICARE

## 2024-07-02 VITALS
RESPIRATION RATE: 16 BRPM | HEART RATE: 80 BPM | TEMPERATURE: 97.9 F | DIASTOLIC BLOOD PRESSURE: 92 MMHG | SYSTOLIC BLOOD PRESSURE: 168 MMHG

## 2024-07-02 DIAGNOSIS — N18.6 ESRD (END STAGE RENAL DISEASE) ON DIALYSIS (HCC): ICD-10-CM

## 2024-07-02 DIAGNOSIS — L97.425 DIABETIC ULCER OF LEFT MIDFOOT ASSOCIATED WITH TYPE 2 DIABETES MELLITUS, WITH MUSCLE INVOLVEMENT WITHOUT EVIDENCE OF NECROSIS (HCC): ICD-10-CM

## 2024-07-02 DIAGNOSIS — G89.29 CHRONIC FOOT PAIN, LEFT: ICD-10-CM

## 2024-07-02 DIAGNOSIS — M79.672 CHRONIC FOOT PAIN, LEFT: ICD-10-CM

## 2024-07-02 DIAGNOSIS — Z78.9 PRESENCE OF SURGICAL INCISION: ICD-10-CM

## 2024-07-02 DIAGNOSIS — Z99.2 ESRD (END STAGE RENAL DISEASE) ON DIALYSIS (HCC): ICD-10-CM

## 2024-07-02 DIAGNOSIS — E11.621 DIABETIC ULCER OF LEFT MIDFOOT ASSOCIATED WITH TYPE 2 DIABETES MELLITUS, WITH MUSCLE INVOLVEMENT WITHOUT EVIDENCE OF NECROSIS (HCC): ICD-10-CM

## 2024-07-02 DIAGNOSIS — Z89.422 ABSENCE OF TOE OF LEFT FOOT (HCC): Primary | ICD-10-CM

## 2024-07-02 PROCEDURE — 11045 DBRDMT SUBQ TISS EACH ADDL: CPT | Performed by: NURSE PRACTITIONER

## 2024-07-02 PROCEDURE — 11042 DBRDMT SUBQ TIS 1ST 20SQCM/<: CPT

## 2024-07-02 PROCEDURE — 97605 NEG PRS WND THER DME<=50SQCM: CPT

## 2024-07-02 PROCEDURE — 11045 DBRDMT SUBQ TISS EACH ADDL: CPT

## 2024-07-02 PROCEDURE — 11042 DBRDMT SUBQ TIS 1ST 20SQCM/<: CPT | Performed by: NURSE PRACTITIONER

## 2024-07-02 RX ORDER — LIDOCAINE HYDROCHLORIDE 20 MG/ML
JELLY TOPICAL ONCE
OUTPATIENT
Start: 2024-07-02 | End: 2024-07-02

## 2024-07-02 RX ORDER — GENTAMICIN SULFATE 1 MG/G
OINTMENT TOPICAL ONCE
OUTPATIENT
Start: 2024-07-02 | End: 2024-07-02

## 2024-07-02 RX ORDER — OXYCODONE AND ACETAMINOPHEN 7.5; 325 MG/1; MG/1
1 TABLET ORAL EVERY 8 HOURS PRN
Qty: 21 TABLET | Refills: 0 | Status: SHIPPED | OUTPATIENT
Start: 2024-07-02 | End: 2024-07-09

## 2024-07-02 RX ORDER — BACITRACIN ZINC 500 [USP'U]/G
OINTMENT TOPICAL ONCE
OUTPATIENT
Start: 2024-07-02 | End: 2024-07-02

## 2024-07-02 RX ORDER — SODIUM CHLOR/HYPOCHLOROUS ACID 0.033 %
SOLUTION, IRRIGATION IRRIGATION ONCE
OUTPATIENT
Start: 2024-07-02 | End: 2024-07-02

## 2024-07-02 RX ORDER — CLOBETASOL PROPIONATE 0.5 MG/G
OINTMENT TOPICAL ONCE
OUTPATIENT
Start: 2024-07-02 | End: 2024-07-02

## 2024-07-02 RX ORDER — BETAMETHASONE DIPROPIONATE 0.5 MG/G
CREAM TOPICAL ONCE
OUTPATIENT
Start: 2024-07-02 | End: 2024-07-02

## 2024-07-02 RX ORDER — LIDOCAINE HYDROCHLORIDE 40 MG/ML
SOLUTION TOPICAL ONCE
OUTPATIENT
Start: 2024-07-02 | End: 2024-07-02

## 2024-07-02 RX ORDER — TRIAMCINOLONE ACETONIDE 1 MG/G
OINTMENT TOPICAL ONCE
OUTPATIENT
Start: 2024-07-02 | End: 2024-07-02

## 2024-07-02 RX ORDER — LIDOCAINE 40 MG/G
CREAM TOPICAL ONCE
OUTPATIENT
Start: 2024-07-02 | End: 2024-07-02

## 2024-07-02 RX ORDER — IBUPROFEN 200 MG
TABLET ORAL ONCE
OUTPATIENT
Start: 2024-07-02 | End: 2024-07-02

## 2024-07-02 RX ORDER — BACITRACIN ZINC AND POLYMYXIN B SULFATE 500; 1000 [USP'U]/G; [USP'U]/G
OINTMENT TOPICAL ONCE
OUTPATIENT
Start: 2024-07-02 | End: 2024-07-02

## 2024-07-02 RX ORDER — LIDOCAINE 50 MG/G
OINTMENT TOPICAL ONCE
OUTPATIENT
Start: 2024-07-02 | End: 2024-07-02

## 2024-07-02 ASSESSMENT — PAIN DESCRIPTION - ONSET: ONSET: ON-GOING

## 2024-07-02 ASSESSMENT — PAIN DESCRIPTION - LOCATION: LOCATION: FOOT

## 2024-07-02 ASSESSMENT — PAIN DESCRIPTION - FREQUENCY: FREQUENCY: CONTINUOUS

## 2024-07-02 ASSESSMENT — PAIN DESCRIPTION - PAIN TYPE: TYPE: CHRONIC PAIN

## 2024-07-02 ASSESSMENT — PAIN - FUNCTIONAL ASSESSMENT: PAIN_FUNCTIONAL_ASSESSMENT: PREVENTS OR INTERFERES SOME ACTIVE ACTIVITIES AND ADLS

## 2024-07-02 ASSESSMENT — PAIN DESCRIPTION - ORIENTATION: ORIENTATION: LEFT

## 2024-07-02 ASSESSMENT — PAIN SCALES - GENERAL: PAINLEVEL_OUTOF10: 7

## 2024-07-02 ASSESSMENT — PAIN DESCRIPTION - DESCRIPTORS: DESCRIPTORS: SHARP

## 2024-07-02 NOTE — PROGRESS NOTES
Wound Care Center Progress Visit      Yovanny Levine  AGE: 49 y.o.   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 male who presents to the Wound Clinic for evaluation and treatment of Chronic diabetic  and  surgical ulcer(s) of the left foot. The condition is of marked severity. The patient has been seeing Dr. Webber at the wound clinic since 24. He was hospitalized -24  Sepsis secondary to left foot cellulitis and chronic left diabetic foot ulcer. General surgery consulted, patient underwent incision and drainage of left foot  and again 2/10/24, IV antibiotics given, home on Augmentin through 3/20/24.  Hospitalized 3/12-3/20/24 with diabetic foot infection, IV antibiotics, switched to oral Cipro. Hospitalized -24 with I&D and foot debridement per Dr. Forrest 24 with wound vac placement. Osteomyelitis, infectious disease consult: plan for vancomycin with dialysis for 6-week cumulative course with end date of 24. The patient has significant underlying medical conditions as below. José Luis Izquierdo MD is PCP.    Wound Pain Timing/Severity: waxing and waning, moderate  Quality of pain: shooting, pins and needles  Severity of pain:  5 / 10   Modifying Factors: diabetes, chronic pressure, shear force, obesity, and non-adherence  Associated Signs/Symptoms: edema,

## 2024-07-02 NOTE — PATIENT INSTRUCTIONS
PHYSICIAN ORDERS AND DISCHARGE INSTRUCTIONS     Wound cleansing:              Do not scrub or use excessive force.             Wash hands with soap and water before and after dressing changes.             Prior to applying a clean dressing, cleanse wound with normal saline,               wound cleanser, or mild soap and water.              Ask the physician or nurse before getting the wound(s) wet in a shower                      Wound Care Notes:  Sees Dr Kumar.                                         Orders for this week: 2024     Fax to [facility]! Reapply wound vac if it becomes disconnected ASAP even if macerated     Left Lateral Distal plantar (larger) wound: WOUND VAC THERAPY:  SKIN PREP OR MASTISOL, THEN DUODERM TO PERIWOUND FOR PROTECTION.   Bolster proximal wound together with drape after foam is placed   APPLY puracol ag in clinic (use stimulen powder at home)  on BLACK FOAM TO WOUND. SECURE VAC DRESSING WITH DRAPE.  SET WOUND VAC  CONTINUOUS SUCTION.   CANISTER CHANGE WEEKLY OR ACCORDING TO VOLUME OF DRAINAGE.  Wrap with coban 2 full ( may use ace wrap if coban 2 full not available) , but do not compress vac tubing against skin.  WOUND VAC DRESSING TO BE CHANGED BY HOME CARE ON  AND SATURDAY (OR ).  WOUND CARE CENTER WILL CHANGE ON  (NEED TO BRING VAC SUPPLIES TO APPOINTMENTS - CANISTER AND BLACK FOAM SUCTION KIT).        Plan: To apply x2 crushed flagyl to wound bed when received         Follow Up Instructions: At the Wound Care Center in 1 week  Primary Wound Care Provider: Dr Forrest  Call  for any questions or concerns.  Central [phone] for imaging lab work

## 2024-07-02 NOTE — PROGRESS NOTES
Negative Pressure Wound Therapy    NAME:  Yovanny Levine  YOB: 1975  MEDICAL RECORD NUMBER:  [id number]  DATE:  7/2/2024    Applied Negative Pressure to left lateral foot wound(s)/ulcer(s).  [x] Applied skin barrier prep to mao-wound.   [x] Cut strips of plastic drape to picture frame wound so that mao-wound is     covered with the drape.   [x] If bridging dressing to less prominent site, cover any intact skin that will come in contact with the Negative Pressure Therapy sponge, gauze or channel drain with plastic drape. The sponge should never touch intact skin.   [x] Cut sponge, gauze or channel drain to size which will fit into the wound/ulcer bed without being forced.   [x] Be sure the sponge is large enough to hold the entire round plastic flange which is attached to the tubing. Never allow flange to be larger than the sponge or it will produce suction damaging intact skin.  Total number of individual pieces of foam used within the wound bed: 1    [x] If bridging the dressing away from the primary site, be sure the bridge leads to a piece of sponge large enough to hold the entire flange without allowing any of the flange to overlap onto intact skin.   [x] Covered sponge, gauze or channel drain with plastic drape.   [x] Cut a hole in this plastic drape directly over the sponge the same size as the plastic drain tubing.   [x] Removed plastic liner from flange and apply it directly over the hole you cut.   [x] Removed the plastic cover from the flange.   [x] Attached the tubing to the wound/ulcer Negative Pressure Therapy and turn it on to be sure a vacuum is created and that there are no leaks.   [x] If air leaks occur, use plastic drape to patch them.   [x] Secured Negative Pressure Therapy dressing with ace wrap loosely if located on an extremity. Maintain tubing outside of ace wrap. Tubing must not exert pressure on intact skin.    Applied per  Guidelines      Electronically signed

## 2024-07-02 NOTE — PROGRESS NOTES
Multilayer Compression Wrap   (Not Unna) Below the Knee    NAME:  Yovanny Levine  YOB: 1975  MEDICAL RECORD NUMBER:  [id number]  DATE:  7/2/2024    Multilayer compression wrap: Removed old Multilayer wrap if indicated and wash leg with mild soap/water.  Applied moisturizing agent to dry skin as needed.   Applied primary and secondary dressing as ordered.  Applied multilayered dressing below the knee to left lower leg.  Instructed patient/caregiver not to remove dressing and to keep it clean and dry.   Instructed patient/caregiver on complications to report to provider, such as pain, numbness in toes, heavy drainage, and slippage of dressing.  Instructed patient on purpose of compression dressing and on activity and exercise recommendations.      Electronically signed by Kat Dailey LPN on 7/2/2024 at 1:55 PM

## 2024-07-09 ENCOUNTER — HOSPITAL ENCOUNTER (OUTPATIENT)
Dept: WOUND CARE | Age: 49
Discharge: HOME | End: 2024-07-09
Attending: STUDENT IN AN ORGANIZED HEALTH CARE EDUCATION/TRAINING PROGRAM
Payer: MEDICARE

## 2024-07-09 VITALS
TEMPERATURE: 98.5 F | DIASTOLIC BLOOD PRESSURE: 82 MMHG | HEART RATE: 85 BPM | RESPIRATION RATE: 16 BRPM | SYSTOLIC BLOOD PRESSURE: 156 MMHG

## 2024-07-09 DIAGNOSIS — L97.425 DIABETIC ULCER OF LEFT MIDFOOT ASSOCIATED WITH TYPE 2 DIABETES MELLITUS, WITH MUSCLE INVOLVEMENT WITHOUT EVIDENCE OF NECROSIS (HCC): ICD-10-CM

## 2024-07-09 DIAGNOSIS — G89.29 CHRONIC FOOT PAIN, LEFT: ICD-10-CM

## 2024-07-09 DIAGNOSIS — M79.672 CHRONIC FOOT PAIN, LEFT: ICD-10-CM

## 2024-07-09 DIAGNOSIS — E11.621 DIABETIC ULCER OF LEFT MIDFOOT ASSOCIATED WITH TYPE 2 DIABETES MELLITUS, WITH MUSCLE INVOLVEMENT WITHOUT EVIDENCE OF NECROSIS (HCC): ICD-10-CM

## 2024-07-09 DIAGNOSIS — Z99.2 ESRD (END STAGE RENAL DISEASE) ON DIALYSIS (HCC): ICD-10-CM

## 2024-07-09 DIAGNOSIS — E11.21 DIABETIC NEPHROPATHY ASSOCIATED WITH TYPE 2 DIABETES MELLITUS (HCC): ICD-10-CM

## 2024-07-09 DIAGNOSIS — N18.6 ESRD (END STAGE RENAL DISEASE) ON DIALYSIS (HCC): ICD-10-CM

## 2024-07-09 DIAGNOSIS — Z89.422 ABSENCE OF TOE OF LEFT FOOT (HCC): Primary | ICD-10-CM

## 2024-07-09 PROCEDURE — 97605 NEG PRS WND THER DME<=50SQCM: CPT

## 2024-07-09 PROCEDURE — 11042 DBRDMT SUBQ TIS 1ST 20SQCM/<: CPT

## 2024-07-09 PROCEDURE — 11045 DBRDMT SUBQ TISS EACH ADDL: CPT

## 2024-07-09 PROCEDURE — 11045 DBRDMT SUBQ TISS EACH ADDL: CPT | Performed by: NURSE PRACTITIONER

## 2024-07-09 PROCEDURE — 11042 DBRDMT SUBQ TIS 1ST 20SQCM/<: CPT | Performed by: NURSE PRACTITIONER

## 2024-07-09 RX ORDER — METRONIDAZOLE 500 MG/1
500 TABLET ORAL 3 TIMES DAILY
Qty: 90 TABLET | Refills: 0 | Status: SHIPPED | OUTPATIENT
Start: 2024-07-09 | End: 2024-08-08

## 2024-07-09 RX ORDER — SULFAMETHOXAZOLE AND TRIMETHOPRIM 400; 80 MG/1; MG/1
1 TABLET ORAL 2 TIMES DAILY
Qty: 60 TABLET | Refills: 0 | Status: SHIPPED | OUTPATIENT
Start: 2024-07-09 | End: 2024-08-08

## 2024-07-09 RX ORDER — OXYCODONE AND ACETAMINOPHEN 7.5; 325 MG/1; MG/1
1 TABLET ORAL EVERY 8 HOURS PRN
Qty: 21 TABLET | Refills: 0 | Status: SHIPPED | OUTPATIENT
Start: 2024-07-24 | End: 2024-07-31

## 2024-07-09 RX ORDER — GENTAMICIN SULFATE 1 MG/G
OINTMENT TOPICAL ONCE
OUTPATIENT
Start: 2024-07-09 | End: 2024-07-09

## 2024-07-09 RX ORDER — LIDOCAINE 40 MG/G
CREAM TOPICAL ONCE
OUTPATIENT
Start: 2024-07-09 | End: 2024-07-09

## 2024-07-09 RX ORDER — LIDOCAINE HYDROCHLORIDE 40 MG/ML
SOLUTION TOPICAL ONCE
OUTPATIENT
Start: 2024-07-09 | End: 2024-07-09

## 2024-07-09 RX ORDER — LIDOCAINE HYDROCHLORIDE 20 MG/ML
JELLY TOPICAL ONCE
OUTPATIENT
Start: 2024-07-09 | End: 2024-07-09

## 2024-07-09 RX ORDER — IBUPROFEN 200 MG
TABLET ORAL ONCE
OUTPATIENT
Start: 2024-07-09 | End: 2024-07-09

## 2024-07-09 RX ORDER — CLOBETASOL PROPIONATE 0.5 MG/G
OINTMENT TOPICAL ONCE
OUTPATIENT
Start: 2024-07-09 | End: 2024-07-09

## 2024-07-09 RX ORDER — LIDOCAINE 50 MG/G
OINTMENT TOPICAL ONCE
OUTPATIENT
Start: 2024-07-09 | End: 2024-07-09

## 2024-07-09 RX ORDER — OXYCODONE AND ACETAMINOPHEN 7.5; 325 MG/1; MG/1
1 TABLET ORAL EVERY 8 HOURS PRN
Qty: 42 TABLET | Refills: 0 | Status: SHIPPED | OUTPATIENT
Start: 2024-07-09 | End: 2024-07-09 | Stop reason: SDUPTHER

## 2024-07-09 RX ORDER — SODIUM CHLOR/HYPOCHLOROUS ACID 0.033 %
SOLUTION, IRRIGATION IRRIGATION ONCE
OUTPATIENT
Start: 2024-07-09 | End: 2024-07-09

## 2024-07-09 RX ORDER — TRIAMCINOLONE ACETONIDE 1 MG/G
OINTMENT TOPICAL ONCE
OUTPATIENT
Start: 2024-07-09 | End: 2024-07-09

## 2024-07-09 RX ORDER — BETAMETHASONE DIPROPIONATE 0.5 MG/G
CREAM TOPICAL ONCE
OUTPATIENT
Start: 2024-07-09 | End: 2024-07-09

## 2024-07-09 RX ORDER — BACITRACIN ZINC 500 [USP'U]/G
OINTMENT TOPICAL ONCE
OUTPATIENT
Start: 2024-07-09 | End: 2024-07-09

## 2024-07-09 RX ORDER — BACITRACIN ZINC AND POLYMYXIN B SULFATE 500; 1000 [USP'U]/G; [USP'U]/G
OINTMENT TOPICAL ONCE
OUTPATIENT
Start: 2024-07-09 | End: 2024-07-09

## 2024-07-09 ASSESSMENT — PAIN DESCRIPTION - FREQUENCY: FREQUENCY: CONTINUOUS

## 2024-07-09 ASSESSMENT — PAIN DESCRIPTION - ONSET: ONSET: ON-GOING

## 2024-07-09 ASSESSMENT — PAIN SCALES - GENERAL: PAINLEVEL_OUTOF10: 7

## 2024-07-09 NOTE — PROGRESS NOTES
Wound Care Center Progress Visit      Yovanny Levine  AGE: 49 y.o.   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 male who presents to the Wound Clinic for evaluation and treatment of Chronic diabetic  and  surgical ulcer(s) of the left foot. The condition is of marked severity. The patient has been seeing Dr. Webber at the wound clinic since 24. He was hospitalized -24  Sepsis secondary to left foot cellulitis and chronic left diabetic foot ulcer. General surgery consulted, patient underwent incision and drainage of left foot  and again 2/10/24, IV antibiotics given, home on Augmentin through 3/20/24.  Hospitalized 3/12-3/20/24 with diabetic foot infection, IV antibiotics, switched to oral Cipro. Hospitalized -24 with I&D and foot debridement per Dr. Forrest 24 with wound vac placement. Osteomyelitis, infectious disease consult: plan for vancomycin with dialysis for 6-week cumulative course with end date of 24. The patient has significant underlying medical conditions as below. José Luis Izquierdo MD is PCP.    Wound Pain Timing/Severity: waxing and waning, moderate  Quality of pain: shooting, pins and needles  Severity of pain:  5 / 10   Modifying Factors: diabetes, chronic pressure, shear force, obesity, and

## 2024-07-09 NOTE — PROGRESS NOTES
Multilayer Compression Wrap   (Not Unna) Below the Knee    NAME:  Yovanny Levine  YOB: 1975  MEDICAL RECORD NUMBER:  [id number]  DATE:  7/9/2024    Multilayer compression wrap: Removed old Multilayer wrap if indicated and wash leg with mild soap/water.  Applied moisturizing agent to dry skin as needed.   Applied primary and secondary dressing as ordered.  Applied multilayered dressing below the knee to left lower leg.  Instructed patient/caregiver not to remove dressing and to keep it clean and dry.   Instructed patient/caregiver on complications to report to provider, such as pain, numbness in toes, heavy drainage, and slippage of dressing.  Instructed patient on purpose of compression dressing and on activity and exercise recommendations.      Electronically signed by Kat Dailey LPN on 7/9/2024 at 2:41 PM

## 2024-07-09 NOTE — PATIENT INSTRUCTIONS
PHYSICIAN ORDERS AND DISCHARGE INSTRUCTIONS     Wound cleansing:              Do not scrub or use excessive force.             Wash hands with soap and water before and after dressing changes.             Prior to applying a clean dressing, cleanse wound with normal saline,               wound cleanser, or mild soap and water.              Ask the physician or nurse before getting the wound(s) wet in a shower                      Wound Care Notes:  Sees Dr Kumar.                                         Orders for this week: 2024     Fax to [facility]! Reapply wound vac if it becomes disconnected ASAP even if macerated     Left Lateral Distal plantar (larger) wound: WOUND VAC THERAPY:  SKIN PREP OR MASTISOL, THEN DUODERM TO PERIWOUND FOR PROTECTION.   Bolster proximal wound together with drape after foam is placed   APPLY crushed 2 tabs of Flagyl, puracol ag in clinic (use stimulen powder at home)  on BLACK FOAM TO WOUND. SECURE VAC DRESSING WITH DRAPE.  SET WOUND VAC  CONTINUOUS SUCTION.   CANISTER CHANGE WEEKLY OR ACCORDING TO VOLUME OF DRAINAGE.  Wrap with coban 2 full ( may use ace wrap if coban 2 full not available) , but do not compress vac tubing against skin.  WOUND VAC DRESSING TO BE CHANGED BY HOME CARE ON  SATURDAY (OR ).  WOUND CARE CENTER WILL CHANGE ON  (NEED TO BRING VAC SUPPLIES TO APPOINTMENTS - CANISTER AND BLACK FOAM SUCTION KIT).        Plan:         Follow Up Instructions: At the Wound Care Center in 1 week  Primary Wound Care Provider: Dr Forrest  Call  for any questions or concerns.  Central [phone] for imaging lab work

## 2024-07-16 ENCOUNTER — HOSPITAL ENCOUNTER (OUTPATIENT)
Dept: WOUND CARE | Age: 49
Discharge: HOME | End: 2024-07-16
Attending: STUDENT IN AN ORGANIZED HEALTH CARE EDUCATION/TRAINING PROGRAM
Payer: MEDICARE

## 2024-07-16 VITALS
DIASTOLIC BLOOD PRESSURE: 85 MMHG | HEART RATE: 87 BPM | SYSTOLIC BLOOD PRESSURE: 175 MMHG | RESPIRATION RATE: 16 BRPM | TEMPERATURE: 97.9 F

## 2024-07-16 PROCEDURE — 97605 NEG PRS WND THER DME<=50SQCM: CPT

## 2024-07-16 ASSESSMENT — PAIN DESCRIPTION - FREQUENCY: FREQUENCY: CONTINUOUS

## 2024-07-16 ASSESSMENT — PAIN SCALES - GENERAL: PAINLEVEL_OUTOF10: 7

## 2024-07-16 ASSESSMENT — PAIN DESCRIPTION - LOCATION: LOCATION: FOOT

## 2024-07-16 ASSESSMENT — PAIN DESCRIPTION - PAIN TYPE: TYPE: CHRONIC PAIN

## 2024-07-16 ASSESSMENT — PAIN DESCRIPTION - ORIENTATION: ORIENTATION: LEFT

## 2024-07-16 ASSESSMENT — PAIN DESCRIPTION - ONSET: ONSET: ON-GOING

## 2024-07-16 ASSESSMENT — PAIN DESCRIPTION - DESCRIPTORS: DESCRIPTORS: SHARP

## 2024-07-16 NOTE — WOUND CARE
WOUND VAC THERAPY:     DUODERM TO PERIWOUND FOR PROTECTION. APPLY BLACK FOAM TO WOUND  APPLY MAY USE MEPITEL TO WOUND BED TO PREVENT BLACK FOAM FROM ADHERING TO WOUND BED. SECURE VAC. DRESSING WITH DRAPE.    SET WOUND VAC  CONTINUOUS SUCTION. CANISTER CHANGE WITH EACH DRESSING CHANGE OR ACCORDING TO VOLUME OF DRAINAGE.    WOUND VAC DRESSING TO BE CHANGED MON, WED, FRI  
18-May-2021 19:00

## 2024-07-16 NOTE — PATIENT INSTRUCTIONS
PHYSICIAN ORDERS AND DISCHARGE INSTRUCTIONS     Wound cleansing:              Do not scrub or use excessive force.             Wash hands with soap and water before and after dressing changes.             Prior to applying a clean dressing, cleanse wound with normal saline,               wound cleanser, or mild soap and water.              Ask the physician or nurse before getting the wound(s) wet in a shower                      Wound Care Notes:  Sees Dr Kumar.                                         Orders for this week: 2024     Fax to [facility]! Reapply wound vac if it becomes disconnected ASAP even if macerated     Left Lateral Distal plantar (larger) wound: WOUND VAC THERAPY:  SKIN PREP OR MASTISOL, THEN DUODERM TO PERIWOUND FOR PROTECTION.   Bolster proximal wound together with drape after foam is placed   APPLY crushed 2 tabs of Flagyl, puracol ag in clinic (use stimulen powder at home)  on BLACK FOAM TO WOUND. SECURE VAC DRESSING WITH DRAPE.  SET WOUND VAC  CONTINUOUS SUCTION.   CANISTER CHANGE WEEKLY OR ACCORDING TO VOLUME OF DRAINAGE.  Wrap with coban 2 full ( may use ace wrap if coban 2 full not available) , but do not compress vac tubing against skin.  WOUND VAC DRESSING TO BE CHANGED BY HOME CARE ON  SATURDAY (OR ).  WOUND CARE CENTER WILL CHANGE ON  (NEED TO BRING VAC SUPPLIES TO APPOINTMENTS - CANISTER AND BLACK FOAM SUCTION KIT).        Plan:         Follow Up Instructions: At the Wound Care Center in 1 week  Primary Wound Care Provider: Dr Forrest  Call  for any questions or concerns.  Central [phone] for imaging lab work

## 2024-07-23 ENCOUNTER — HOSPITAL ENCOUNTER (OUTPATIENT)
Dept: WOUND CARE | Age: 49
Discharge: HOME | End: 2024-07-23
Attending: STUDENT IN AN ORGANIZED HEALTH CARE EDUCATION/TRAINING PROGRAM
Payer: MEDICARE

## 2024-07-23 VITALS
RESPIRATION RATE: 18 BRPM | SYSTOLIC BLOOD PRESSURE: 145 MMHG | DIASTOLIC BLOOD PRESSURE: 78 MMHG | TEMPERATURE: 97.8 F | HEART RATE: 90 BPM

## 2024-07-23 DIAGNOSIS — Z89.422 ABSENCE OF TOE OF LEFT FOOT (HCC): Primary | ICD-10-CM

## 2024-07-23 DIAGNOSIS — N18.6 ESRD (END STAGE RENAL DISEASE) ON DIALYSIS (HCC): ICD-10-CM

## 2024-07-23 DIAGNOSIS — Z99.2 ESRD (END STAGE RENAL DISEASE) ON DIALYSIS (HCC): ICD-10-CM

## 2024-07-23 DIAGNOSIS — M79.672 CHRONIC FOOT PAIN, LEFT: ICD-10-CM

## 2024-07-23 DIAGNOSIS — E11.621 DIABETIC ULCER OF LEFT MIDFOOT ASSOCIATED WITH TYPE 2 DIABETES MELLITUS, WITH MUSCLE INVOLVEMENT WITHOUT EVIDENCE OF NECROSIS (HCC): ICD-10-CM

## 2024-07-23 DIAGNOSIS — Z78.9 PRESENCE OF SURGICAL INCISION: ICD-10-CM

## 2024-07-23 DIAGNOSIS — L97.425 DIABETIC ULCER OF LEFT MIDFOOT ASSOCIATED WITH TYPE 2 DIABETES MELLITUS, WITH MUSCLE INVOLVEMENT WITHOUT EVIDENCE OF NECROSIS (HCC): ICD-10-CM

## 2024-07-23 DIAGNOSIS — G89.29 CHRONIC FOOT PAIN, LEFT: ICD-10-CM

## 2024-07-23 PROCEDURE — 11045 DBRDMT SUBQ TISS EACH ADDL: CPT | Performed by: NURSE PRACTITIONER

## 2024-07-23 PROCEDURE — 11045 DBRDMT SUBQ TISS EACH ADDL: CPT

## 2024-07-23 PROCEDURE — 11042 DBRDMT SUBQ TIS 1ST 20SQCM/<: CPT | Performed by: NURSE PRACTITIONER

## 2024-07-23 PROCEDURE — 97605 NEG PRS WND THER DME<=50SQCM: CPT

## 2024-07-23 PROCEDURE — 11042 DBRDMT SUBQ TIS 1ST 20SQCM/<: CPT

## 2024-07-23 RX ORDER — OXYCODONE AND ACETAMINOPHEN 7.5; 325 MG/1; MG/1
1 TABLET ORAL EVERY 8 HOURS PRN
Qty: 42 TABLET | Refills: 0 | Status: SHIPPED | OUTPATIENT
Start: 2024-07-24 | End: 2024-08-07

## 2024-07-23 RX ORDER — BACITRACIN ZINC 500 [USP'U]/G
OINTMENT TOPICAL ONCE
OUTPATIENT
Start: 2024-07-23 | End: 2024-07-23

## 2024-07-23 RX ORDER — BETAMETHASONE DIPROPIONATE 0.5 MG/G
CREAM TOPICAL ONCE
OUTPATIENT
Start: 2024-07-23 | End: 2024-07-23

## 2024-07-23 RX ORDER — LIDOCAINE HYDROCHLORIDE 40 MG/ML
SOLUTION TOPICAL ONCE
OUTPATIENT
Start: 2024-07-23 | End: 2024-07-23

## 2024-07-23 RX ORDER — GENTAMICIN SULFATE 1 MG/G
OINTMENT TOPICAL ONCE
OUTPATIENT
Start: 2024-07-23 | End: 2024-07-23

## 2024-07-23 RX ORDER — IBUPROFEN 200 MG
TABLET ORAL ONCE
OUTPATIENT
Start: 2024-07-23 | End: 2024-07-23

## 2024-07-23 RX ORDER — BACITRACIN ZINC AND POLYMYXIN B SULFATE 500; 1000 [USP'U]/G; [USP'U]/G
OINTMENT TOPICAL ONCE
OUTPATIENT
Start: 2024-07-23 | End: 2024-07-23

## 2024-07-23 RX ORDER — LIDOCAINE HYDROCHLORIDE 20 MG/ML
JELLY TOPICAL ONCE
OUTPATIENT
Start: 2024-07-23 | End: 2024-07-23

## 2024-07-23 RX ORDER — CLOBETASOL PROPIONATE 0.5 MG/G
OINTMENT TOPICAL ONCE
OUTPATIENT
Start: 2024-07-23 | End: 2024-07-23

## 2024-07-23 RX ORDER — SODIUM CHLOR/HYPOCHLOROUS ACID 0.033 %
SOLUTION, IRRIGATION IRRIGATION ONCE
OUTPATIENT
Start: 2024-07-23 | End: 2024-07-23

## 2024-07-23 RX ORDER — TRIAMCINOLONE ACETONIDE 1 MG/G
OINTMENT TOPICAL ONCE
OUTPATIENT
Start: 2024-07-23 | End: 2024-07-23

## 2024-07-23 RX ORDER — LIDOCAINE 50 MG/G
OINTMENT TOPICAL ONCE
OUTPATIENT
Start: 2024-07-23 | End: 2024-07-23

## 2024-07-23 RX ORDER — LIDOCAINE 40 MG/G
CREAM TOPICAL ONCE
OUTPATIENT
Start: 2024-07-23 | End: 2024-07-23

## 2024-07-23 NOTE — PROGRESS NOTES
Negative Pressure    NAME:  Yovanny Levine  YOB: 1975  MEDICAL RECORD NUMBER:  [id number]  DATE:  7/23/2024    Applied Negative Pressure to LEFT FOOT wound(s)/ulcer(s).  [x] Applied skin barrier prep to mao-wound.   [x] Cut strips of plastic drape to picture frame wound so that mao-wound is     covered with the drape.   [x] If bridging dressing to less prominent site, cover any intact skin that will come in contact with the Negative Pressure Therapy sponge, gauze or channel drain with plastic drape.  The sponge should never touch intact skin.   [x] Cut sponge, gauze or channel drain to size which will fit into the wound/ulcer bed without being forced.   [x] Be sure the sponge is large enough to hold the entire round plastic flange which is attached to the tubing.  Never allow flange to be larger than the sponge or it will produce suction damaging intact skin.  Total number of individual pieces of foam used within the wound bed: 1    [x] If bridging the dressing away from the primary site, be sure the bridge leads to a piece of sponge large enough to hold the entire flange without allowing any of the flange to overlap onto intact skin.   [x] Covered sponge, gauze or channel drain with plastic drape.   [x] Cut a hole in this plastic drape directly over the sponge the same size as the plastic drain tubing.   [x] Removed plastic liner from flange and apply it directly over the hole you cut.   [x] Removed the plastic cover from the flange.   [x] Attached the tubing to the wound/ulcer Negative Pressure Therapy and turn it on to be sure a vacuum is created and that there are no leaks.   [x] If air leaks occur, use plastic drape to patch them.   [x] Secured Negative Pressure Therapy dressing with ace wrap loosely if located on an extremity. Maintain tubing outside of ace wrap. Tubing must not exert pressure on intact skin.    Applied per  Guidelines      Electronically signed by Ankit Dan 
sulfamethoxazole-trimethoprim (BACTRIM) 400-80 MG per tablet Take 1 tablet by mouth 2 times daily 60 tablet 0    metroNIDAZOLE (FLAGYL) 500 MG tablet Take 1 tablet by mouth 3 times daily 90 tablet 0    [START ON 7/24/2024] oxyCODONE-acetaminophen (PERCOCET) 7.5-325 MG per tablet Take 1 tablet by mouth every 8 hours as needed for Pain for up to 7 days. Intended supply: 7 days Max Daily Amount: 3 tablets 21 tablet 0    isosorbide mononitrate (IMDUR) 30 MG extended release tablet Take 1 tablet by mouth daily 30 tablet 3    amiodarone (CORDARONE) 200 MG tablet Take 1 tablet by mouth daily 30 tablet 1    clopidogrel (PLAVIX) 75 MG tablet Take 1 tablet by mouth daily 30 tablet 3    Vitamin D (CHOLECALCIFEROL) 25 MCG (1000 UT) TABS tablet Take 5 tablets by mouth daily 150 tablet 0    Wound Cleansers (VASHE WOUND THERAPY) external solution During wound care 475 mL 0    calcitRIOL (ROCALTROL) 0.5 MCG capsule Take 1 capsule by mouth 2 times daily 30 capsule 0    albuterol sulfate HFA (VENTOLIN HFA) 108 (90 Base) MCG/ACT inhaler Inhale 2 puffs into the lungs 4 times daily as needed for Wheezing 18 g 0    insulin glargine (LANTUS SOLOSTAR) 100 UNIT/ML injection pen Inject 10 Units into the skin nightly 3 Adjustable Dose Pre-filled Pen Syringe 3    glucose 4 g chewable tablet Take 4 tablets by mouth as needed for Low blood sugar 60 tablet 3    hydrALAZINE (APRESOLINE) 25 MG tablet Take 1 tablet by mouth every 8 hours 90 tablet 3    amLODIPine (NORVASC) 5 MG tablet Take 1 tablet by mouth daily 30 tablet 3    atorvastatin (LIPITOR) 40 MG tablet Take 1 tablet by mouth nightly 30 tablet 3    carvedilol (COREG) 25 MG tablet Take 1 tablet by mouth 2 times daily (with meals)      calcium carbonate (TUMS) 500 MG chewable tablet Take 2 tablets by mouth 3 times daily (with meals)      [DISCONTINUED] metoprolol tartrate (LOPRESSOR) 25 MG tablet Take 0.5 tablets by mouth 2 times daily 30 tablet 0    [DISCONTINUED] furosemide (LASIX) 40 MG

## 2024-07-23 NOTE — PLAN OF CARE
Problem: Pain  Goal: Verbalizes/displays adequate comfort level or baseline comfort level  7/23/2024 1347 by Ankit Dan, RN  Outcome: Progressing  7/23/2024 1347 by Ankit Dan, RN  Outcome: Progressing     Problem: Wound:  Goal: Will show signs of wound healing; wound closure and no evidence of infection  Description: Will show signs of wound healing; wound closure and no evidence of infection  Outcome: Progressing

## 2024-07-23 NOTE — PATIENT INSTRUCTIONS
PHYSICIAN ORDERS AND DISCHARGE INSTRUCTIONS     Wound cleansing:              Do not scrub or use excessive force.             Wash hands with soap and water before and after dressing changes.             Prior to applying a clean dressing, cleanse wound with normal saline,               wound cleanser, or mild soap and water.              Ask the physician or nurse before getting the wound(s) wet in a shower                      Wound Care Notes:  Sees Dr Kumar.                                         Orders for this week: 2024     Fax to [facility]! Reapply wound vac if it becomes disconnected ASAP even if macerated     Left Lateral Distal plantar (larger) wound: WOUND VAC THERAPY:  SKIN PREP OR MASTISOL, THEN DUODERM TO PERIWOUND FOR PROTECTION.   Bolster proximal wound together with drape after foam is placed   APPLY crushed 2 tabs of Flagyl, puracol ag in clinic (use stimulen powder at home)  on BLACK FOAM TO WOUND. SECURE VAC DRESSING WITH DRAPE.  SET WOUND VAC  CONTINUOUS SUCTION.   CANISTER CHANGE WEEKLY OR ACCORDING TO VOLUME OF DRAINAGE.  Wrap with coban 2 full ( may use ace wrap if coban 2 full not available) , but do not compress vac tubing against skin.  WOUND VAC DRESSING TO BE CHANGED BY HOME CARE ON  SATURDAY (OR ).  WOUND CARE CENTER WILL CHANGE ON  (NEED TO BRING VAC SUPPLIES TO APPOINTMENTS - CANISTER AND BLACK FOAM SUCTION KIT).        Plan:         Follow Up Instructions: At the Wound Care Center in 1 week  Primary Wound Care Provider: Dr Forrest  Call  for any questions or concerns.  Central [phone] for imaging lab work

## 2024-07-30 ENCOUNTER — HOSPITAL ENCOUNTER (OUTPATIENT)
Dept: WOUND CARE | Age: 49
Discharge: HOME | End: 2024-07-30
Attending: STUDENT IN AN ORGANIZED HEALTH CARE EDUCATION/TRAINING PROGRAM
Payer: MEDICARE

## 2024-07-30 VITALS
TEMPERATURE: 98.2 F | RESPIRATION RATE: 18 BRPM | DIASTOLIC BLOOD PRESSURE: 92 MMHG | SYSTOLIC BLOOD PRESSURE: 172 MMHG | HEART RATE: 85 BPM

## 2024-07-30 DIAGNOSIS — Z89.422 ABSENCE OF TOE OF LEFT FOOT (HCC): Primary | ICD-10-CM

## 2024-07-30 DIAGNOSIS — E11.621 DIABETIC ULCER OF LEFT MIDFOOT ASSOCIATED WITH TYPE 2 DIABETES MELLITUS, WITH MUSCLE INVOLVEMENT WITHOUT EVIDENCE OF NECROSIS (HCC): ICD-10-CM

## 2024-07-30 DIAGNOSIS — L97.425 DIABETIC ULCER OF LEFT MIDFOOT ASSOCIATED WITH TYPE 2 DIABETES MELLITUS, WITH MUSCLE INVOLVEMENT WITHOUT EVIDENCE OF NECROSIS (HCC): ICD-10-CM

## 2024-07-30 PROCEDURE — 11042 DBRDMT SUBQ TIS 1ST 20SQCM/<: CPT | Performed by: NURSE PRACTITIONER

## 2024-07-30 PROCEDURE — 11042 DBRDMT SUBQ TIS 1ST 20SQCM/<: CPT

## 2024-07-30 PROCEDURE — 97605 NEG PRS WND THER DME<=50SQCM: CPT

## 2024-07-30 RX ORDER — LIDOCAINE 40 MG/G
CREAM TOPICAL ONCE
OUTPATIENT
Start: 2024-07-30 | End: 2024-07-30

## 2024-07-30 RX ORDER — SODIUM CHLOR/HYPOCHLOROUS ACID 0.033 %
SOLUTION, IRRIGATION IRRIGATION ONCE
OUTPATIENT
Start: 2024-07-30 | End: 2024-07-30

## 2024-07-30 RX ORDER — LIDOCAINE HYDROCHLORIDE 40 MG/ML
SOLUTION TOPICAL ONCE
OUTPATIENT
Start: 2024-07-30 | End: 2024-07-30

## 2024-07-30 RX ORDER — BETAMETHASONE DIPROPIONATE 0.5 MG/G
CREAM TOPICAL ONCE
OUTPATIENT
Start: 2024-07-30 | End: 2024-07-30

## 2024-07-30 RX ORDER — CLOBETASOL PROPIONATE 0.5 MG/G
OINTMENT TOPICAL ONCE
OUTPATIENT
Start: 2024-07-30 | End: 2024-07-30

## 2024-07-30 RX ORDER — BACITRACIN ZINC AND POLYMYXIN B SULFATE 500; 1000 [USP'U]/G; [USP'U]/G
OINTMENT TOPICAL ONCE
OUTPATIENT
Start: 2024-07-30 | End: 2024-07-30

## 2024-07-30 RX ORDER — LIDOCAINE HYDROCHLORIDE 20 MG/ML
JELLY TOPICAL ONCE
OUTPATIENT
Start: 2024-07-30 | End: 2024-07-30

## 2024-07-30 RX ORDER — TRIAMCINOLONE ACETONIDE 1 MG/G
OINTMENT TOPICAL ONCE
OUTPATIENT
Start: 2024-07-30 | End: 2024-07-30

## 2024-07-30 RX ORDER — LIDOCAINE 50 MG/G
OINTMENT TOPICAL ONCE
OUTPATIENT
Start: 2024-07-30 | End: 2024-07-30

## 2024-07-30 RX ORDER — BACITRACIN ZINC 500 [USP'U]/G
OINTMENT TOPICAL ONCE
OUTPATIENT
Start: 2024-07-30 | End: 2024-07-30

## 2024-07-30 RX ORDER — GENTAMICIN SULFATE 1 MG/G
OINTMENT TOPICAL ONCE
OUTPATIENT
Start: 2024-07-30 | End: 2024-07-30

## 2024-07-30 RX ORDER — IBUPROFEN 200 MG
TABLET ORAL ONCE
OUTPATIENT
Start: 2024-07-30 | End: 2024-07-30

## 2024-07-30 ASSESSMENT — PAIN DESCRIPTION - ORIENTATION: ORIENTATION: LEFT

## 2024-07-30 ASSESSMENT — PAIN SCALES - GENERAL: PAINLEVEL_OUTOF10: 7

## 2024-07-30 ASSESSMENT — PAIN DESCRIPTION - LOCATION: LOCATION: FOOT

## 2024-07-30 ASSESSMENT — PAIN DESCRIPTION - DESCRIPTORS: DESCRIPTORS: SHARP

## 2024-07-30 NOTE — PROGRESS NOTES
with coban 2 full ( may use ace wrap if coban 2 full not available) , but do not compress vac tubing against skin.  WOUND VAC DRESSING TO BE CHANGED BY HOME CARE ON  SATURDAY (OR ).  WOUND CARE CENTER WILL CHANGE ON  (NEED TO BRING VAC SUPPLIES TO APPOINTMENTS - CANISTER AND BLACK FOAM SUCTION KIT).        Plan:         Follow Up Instructions: At the Wound Care Center in 1 week  Primary Wound Care Provider: Dr Forrest  Call  for any questions or concerns.  Central [phone] for imaging lab work

## 2024-07-30 NOTE — WOUND CARE
WOUND VAC THERAPY:     DUODERM TO PERIWOUND FOR PROTECTION. APPLY BLACK FOAM TO WOUND  LEFT FOOT WOUND MAY USE MEPITEL TO WOUND BED TO PREVENT BLACK FOAM FROM ADHERING TO WOUND BED. SECURE VAC. DRESSING WITH DRAPE.    SET WOUND VAC  CONTINUOUS SUCTION. CANISTER CHANGE WITH EACH DRESSING CHANGE OR ACCORDING TO VOLUME OF DRAINAGE.    WOUND VAC DRESSING TO BE CHANGED MON, WED, FRI

## 2024-07-30 NOTE — PLAN OF CARE
Problem: Chronic Conditions and Co-morbidities  Goal: Patient's chronic conditions and co-morbidity symptoms are monitored and maintained or improved  7/30/2024 1416 by Rosie Carpio LPN  Outcome: Progressing  7/30/2024 1411 by Rosie Carpio LPN  Outcome: Progressing

## 2024-07-30 NOTE — PATIENT INSTRUCTIONS
PHYSICIAN ORDERS AND DISCHARGE INSTRUCTIONS     Wound cleansing:              Do not scrub or use excessive force.             Wash hands with soap and water before and after dressing changes.             Prior to applying a clean dressing, cleanse wound with normal saline,               wound cleanser, or mild soap and water.              Ask the physician or nurse before getting the wound(s) wet in a shower                      Wound Care Notes:  Sees Dr Kumar.                                         Orders for this week: 2024     Fax to [facility]! Reapply wound vac if it becomes disconnected ASAP even if macerated     Left Lateral Distal plantar (larger) wound: WOUND VAC THERAPY:  SKIN PREP OR MASTISOL, THEN DUODERM TO PERIWOUND FOR PROTECTION.   Bolster proximal wound together with drape after foam is placed   APPLY crushed 2 tabs of Flagyl, puracol ag in clinic (use stimulen powder at home)  on BLACK FOAM TO depth of WOUND. SECURE VAC DRESSING WITH DRAPE.  SET WOUND VAC  CONTINUOUS SUCTION.   CANISTER CHANGE WEEKLY OR ACCORDING TO VOLUME OF DRAINAGE.  Wrap with coban 2 full ( may use ace wrap if coban 2 full not available) , but do not compress vac tubing against skin.  WOUND VAC DRESSING TO BE CHANGED BY HOME CARE ON  SATURDAY (OR ).  WOUND CARE CENTER WILL CHANGE ON  (NEED TO BRING VAC SUPPLIES TO APPOINTMENTS - CANISTER AND BLACK FOAM SUCTION KIT).        Plan:         Follow Up Instructions: At the Wound Care Center in 1 week  Primary Wound Care Provider: Dr Forrest  Call  for any questions or concerns.  Central [phone] for imaging lab work

## 2024-08-06 ENCOUNTER — HOSPITAL ENCOUNTER (OUTPATIENT)
Dept: WOUND CARE | Age: 49
Discharge: HOME | End: 2024-08-06
Attending: STUDENT IN AN ORGANIZED HEALTH CARE EDUCATION/TRAINING PROGRAM
Payer: MEDICARE

## 2024-08-06 VITALS
SYSTOLIC BLOOD PRESSURE: 160 MMHG | DIASTOLIC BLOOD PRESSURE: 90 MMHG | TEMPERATURE: 98.5 F | RESPIRATION RATE: 18 BRPM | HEART RATE: 82 BPM

## 2024-08-06 DIAGNOSIS — M79.672 CHRONIC FOOT PAIN, LEFT: ICD-10-CM

## 2024-08-06 DIAGNOSIS — E11.621 DIABETIC ULCER OF MIDFOOT ASSOCIATED WITH TYPE 2 DIABETES MELLITUS, WITH MUSCLE INVOLVEMENT WITHOUT EVIDENCE OF NECROSIS, UNSPECIFIED LATERALITY (HCC): Primary | ICD-10-CM

## 2024-08-06 DIAGNOSIS — G89.29 CHRONIC FOOT PAIN, LEFT: ICD-10-CM

## 2024-08-06 DIAGNOSIS — L97.405 DIABETIC ULCER OF MIDFOOT ASSOCIATED WITH TYPE 2 DIABETES MELLITUS, WITH MUSCLE INVOLVEMENT WITHOUT EVIDENCE OF NECROSIS, UNSPECIFIED LATERALITY (HCC): Primary | ICD-10-CM

## 2024-08-06 PROCEDURE — 97605 NEG PRS WND THER DME<=50SQCM: CPT

## 2024-08-06 PROCEDURE — 11042 DBRDMT SUBQ TIS 1ST 20SQCM/<: CPT

## 2024-08-06 PROCEDURE — 11042 DBRDMT SUBQ TIS 1ST 20SQCM/<: CPT | Performed by: NURSE PRACTITIONER

## 2024-08-06 RX ORDER — OXYCODONE AND ACETAMINOPHEN 7.5; 325 MG/1; MG/1
1 TABLET ORAL EVERY 8 HOURS PRN
Qty: 42 TABLET | Refills: 0 | Status: SHIPPED | OUTPATIENT
Start: 2024-08-06 | End: 2024-08-20

## 2024-08-06 ASSESSMENT — PAIN DESCRIPTION - FREQUENCY: FREQUENCY: CONTINUOUS

## 2024-08-06 ASSESSMENT — PAIN SCALES - WONG BAKER: WONGBAKER_NUMERICALRESPONSE: NO HURT

## 2024-08-06 ASSESSMENT — PAIN DESCRIPTION - PAIN TYPE: TYPE: CHRONIC PAIN

## 2024-08-06 ASSESSMENT — PAIN DESCRIPTION - LOCATION: LOCATION: FOOT

## 2024-08-06 ASSESSMENT — PAIN - FUNCTIONAL ASSESSMENT: PAIN_FUNCTIONAL_ASSESSMENT: PREVENTS OR INTERFERES SOME ACTIVE ACTIVITIES AND ADLS

## 2024-08-06 ASSESSMENT — PAIN DESCRIPTION - DESCRIPTORS: DESCRIPTORS: SHARP

## 2024-08-06 ASSESSMENT — PAIN DESCRIPTION - ONSET: ONSET: ON-GOING

## 2024-08-06 ASSESSMENT — PAIN SCALES - GENERAL: PAINLEVEL_OUTOF10: 6

## 2024-08-06 ASSESSMENT — PAIN DESCRIPTION - ORIENTATION: ORIENTATION: LEFT

## 2024-08-06 NOTE — PROGRESS NOTES
Wound Care Center Progress Visit      Yovanny Levine  AGE: 49 y.o.   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 male who presents to the Wound Clinic for evaluation and treatment of Chronic diabetic  and  surgical ulcer(s) of the left foot. The condition is of marked severity. The patient has been seeing Dr. Webber at the wound clinic since 24. He was hospitalized -24  Sepsis secondary to left foot cellulitis and chronic left diabetic foot ulcer. General surgery consulted, patient underwent incision and drainage of left foot  and again 2/10/24, IV antibiotics given, home on Augmentin through 3/20/24.  Hospitalized 3/12-3/20/24 with diabetic foot infection, IV antibiotics, switched to oral Cipro. Hospitalized -24 with I&D and foot debridement per Dr. Forrest 24 with wound vac placement. Osteomyelitis, infectious disease consult: plan for vancomycin with dialysis for 6-week cumulative course with end date of 24. The patient has significant underlying medical conditions as below. José Luis Izquierdo MD is PCP.    Wound Pain Timing/Severity: waxing and waning, moderate  Quality of pain: shooting, pins and needles  Severity of pain:  5 / 10   Modifying Factors: diabetes, chronic pressure, shear force, obesity, and non-adherence  Associated Signs/Symptoms: edema, erythema, drainage, odor, and pain    Wound status:     Patient hospitalized -24 with

## 2024-08-06 NOTE — PROGRESS NOTES
.Negative Pressure Wound Therapy    NAME:  Yovanny Levine  YOB: 1975  MEDICAL RECORD NUMBER:  [id number]  DATE:  8/6/2024    Applied Negative Pressure to left foot  wound(s)/ulcer(s).  [x] Applied skin barrier prep to mao-wound.   [x] Cut strips of plastic drape to picture frame wound so that mao-wound is     covered with the drape.   [x] If bridging dressing to less prominent site, cover any intact skin that will come in contact with the Negative Pressure Therapy sponge, gauze or channel drain with plastic drape. The sponge should never touch intact skin.   [x] Cut sponge, gauze or channel drain to size which will fit into the wound/ulcer bed without being forced.   [x] Be sure the sponge is large enough to hold the entire round plastic flange which is attached to the tubing. Never allow flange to be larger than the sponge or it will produce suction damaging intact skin.  Total number of individual pieces of foam used within the wound bed: 1    [x] If bridging the dressing away from the primary site, be sure the bridge leads to a piece of sponge large enough to hold the entire flange without allowing any of the flange to overlap onto intact skin.   [x] Covered sponge, gauze or channel drain with plastic drape.   [] Cut a hole in this plastic drape directly over the sponge the same size as the plastic drain tubing.   [x] Removed plastic liner from flange and apply it directly over the hole you cut.   [x] Removed the plastic cover from the flange.   [x] Attached the tubing to the wound/ulcer Negative Pressure Therapy and turn it on to be sure a vacuum is created and that there are no leaks.   [x] If air leaks occur, use plastic drape to patch them.   [] Secured Negative Pressure Therapy dressing with ace wrap loosely if located on an extremity. Maintain tubing outside of ace wrap. Tubing must not exert pressure on intact skin.    Applied per  Guidelines      Electronically signed by

## 2024-08-06 NOTE — PROGRESS NOTES
.Multilayer Compression Wrap   (Not Unna) Below the Knee    NAME:  Yovanny Levine  YOB: 1975  MEDICAL RECORD NUMBER:  [id number]  DATE:  8/6/2024    Multilayer compression wrap: Removed old Multilayer wrap if indicated and wash leg with mild soap/water.  Applied moisturizing agent to dry skin as needed.   Applied primary and secondary dressing as ordered.  Applied multilayered dressing below the knee to left lower leg.  Instructed patient/caregiver not to remove dressing and to keep it clean and dry.   Instructed patient/caregiver on complications to report to provider, such as pain, numbness in toes, heavy drainage, and slippage of dressing.  Instructed patient on purpose of compression dressing and on activity and exercise recommendations.      Electronically signed by Letha Goodman RN on 8/6/2024 at 11:38 AM

## 2024-08-06 NOTE — PATIENT INSTRUCTIONS
PHYSICIAN ORDERS AND DISCHARGE INSTRUCTIONS     Wound cleansing:              Do not scrub or use excessive force.             Wash hands with soap and water before and after dressing changes.             Prior to applying a clean dressing, cleanse wound with normal saline,               wound cleanser, or mild soap and water.              Ask the physician or nurse before getting the wound(s) wet in a shower                      Wound Care Notes:  Sees Dr Kumar.                                         Orders for this week: 2024     Fax to [facility]! Reapply wound vac if it becomes disconnected ASAP even if macerated     Left Lateral Distal plantar (larger) wound: WOUND VAC THERAPY:  SKIN PREP OR MASTISOL, THEN DUODERM TO PERIWOUND FOR PROTECTION.   Bolster proximal wound together with drape after foam is placed   APPLY crushed 2 tabs of Flagyl, puracol ag in clinic (use stimulen powder at home)  on BLACK FOAM TO depth of WOUND. SECURE VAC DRESSING WITH DRAPE.  SET WOUND VAC  CONTINUOUS SUCTION.   CANISTER CHANGE WEEKLY OR ACCORDING TO VOLUME OF DRAINAGE.  Wrap with coban 2 full ( may use ace wrap if coban 2 full not available) , but do not compress vac tubing against skin.  WOUND VAC DRESSING TO BE CHANGED BY HOME CARE ON  SATURDAY (OR ).  WOUND CARE CENTER WILL CHANGE ON  (NEED TO BRING VAC SUPPLIES TO APPOINTMENTS - CANISTER AND BLACK FOAM SUCTION KIT).        Plan:         Follow Up Instructions: At the Wound Care Center in 1 week  Primary Wound Care Provider: Dr Forrest  Call  for any questions or concerns.  Central [phone] for imaging lab work

## 2024-08-13 ENCOUNTER — HOSPITAL ENCOUNTER (OUTPATIENT)
Dept: WOUND CARE | Age: 49
Discharge: HOME | End: 2024-08-13
Attending: STUDENT IN AN ORGANIZED HEALTH CARE EDUCATION/TRAINING PROGRAM
Payer: MEDICARE

## 2024-08-13 VITALS
TEMPERATURE: 98 F | HEART RATE: 88 BPM | DIASTOLIC BLOOD PRESSURE: 69 MMHG | SYSTOLIC BLOOD PRESSURE: 135 MMHG | RESPIRATION RATE: 18 BRPM

## 2024-08-13 DIAGNOSIS — E08.42 DIABETIC POLYNEUROPATHY ASSOCIATED WITH DIABETES MELLITUS DUE TO UNDERLYING CONDITION (HCC): Chronic | ICD-10-CM

## 2024-08-13 DIAGNOSIS — L97.425 DIABETIC ULCER OF LEFT MIDFOOT ASSOCIATED WITH TYPE 2 DIABETES MELLITUS, WITH MUSCLE INVOLVEMENT WITHOUT EVIDENCE OF NECROSIS (HCC): ICD-10-CM

## 2024-08-13 DIAGNOSIS — Z78.9 PRESENCE OF SURGICAL INCISION: ICD-10-CM

## 2024-08-13 DIAGNOSIS — Z89.422 ABSENCE OF TOE OF LEFT FOOT (HCC): Primary | ICD-10-CM

## 2024-08-13 DIAGNOSIS — Z99.2 ESRD (END STAGE RENAL DISEASE) ON DIALYSIS (HCC): ICD-10-CM

## 2024-08-13 DIAGNOSIS — E11.621 DIABETIC ULCER OF LEFT MIDFOOT ASSOCIATED WITH TYPE 2 DIABETES MELLITUS, WITH MUSCLE INVOLVEMENT WITHOUT EVIDENCE OF NECROSIS (HCC): ICD-10-CM

## 2024-08-13 DIAGNOSIS — N18.6 ESRD (END STAGE RENAL DISEASE) ON DIALYSIS (HCC): ICD-10-CM

## 2024-08-13 PROCEDURE — 97605 NEG PRS WND THER DME<=50SQCM: CPT

## 2024-08-13 PROCEDURE — 11043 DBRDMT MUSC&/FSCA 1ST 20/<: CPT | Performed by: NURSE PRACTITIONER

## 2024-08-13 PROCEDURE — 11043 DBRDMT MUSC&/FSCA 1ST 20/<: CPT

## 2024-08-13 PROCEDURE — 11042 DBRDMT SUBQ TIS 1ST 20SQCM/<: CPT

## 2024-08-13 RX ORDER — LIDOCAINE 40 MG/G
CREAM TOPICAL ONCE
OUTPATIENT
Start: 2024-08-13 | End: 2024-08-13

## 2024-08-13 RX ORDER — CLOBETASOL PROPIONATE 0.5 MG/G
OINTMENT TOPICAL ONCE
OUTPATIENT
Start: 2024-08-13 | End: 2024-08-13

## 2024-08-13 RX ORDER — IBUPROFEN 200 MG
TABLET ORAL ONCE
OUTPATIENT
Start: 2024-08-13 | End: 2024-08-13

## 2024-08-13 RX ORDER — LIDOCAINE HYDROCHLORIDE 20 MG/ML
JELLY TOPICAL ONCE
OUTPATIENT
Start: 2024-08-13 | End: 2024-08-13

## 2024-08-13 RX ORDER — BETAMETHASONE DIPROPIONATE 0.5 MG/G
CREAM TOPICAL ONCE
OUTPATIENT
Start: 2024-08-13 | End: 2024-08-13

## 2024-08-13 RX ORDER — SODIUM CHLOR/HYPOCHLOROUS ACID 0.033 %
SOLUTION, IRRIGATION IRRIGATION ONCE
OUTPATIENT
Start: 2024-08-13 | End: 2024-08-13

## 2024-08-13 RX ORDER — BACITRACIN ZINC AND POLYMYXIN B SULFATE 500; 1000 [USP'U]/G; [USP'U]/G
OINTMENT TOPICAL ONCE
OUTPATIENT
Start: 2024-08-13 | End: 2024-08-13

## 2024-08-13 RX ORDER — BACITRACIN ZINC 500 [USP'U]/G
OINTMENT TOPICAL ONCE
OUTPATIENT
Start: 2024-08-13 | End: 2024-08-13

## 2024-08-13 RX ORDER — TRIAMCINOLONE ACETONIDE 1 MG/G
OINTMENT TOPICAL ONCE
OUTPATIENT
Start: 2024-08-13 | End: 2024-08-13

## 2024-08-13 RX ORDER — LIDOCAINE HYDROCHLORIDE 40 MG/ML
SOLUTION TOPICAL ONCE
OUTPATIENT
Start: 2024-08-13 | End: 2024-08-13

## 2024-08-13 RX ORDER — LIDOCAINE 50 MG/G
OINTMENT TOPICAL ONCE
OUTPATIENT
Start: 2024-08-13 | End: 2024-08-13

## 2024-08-13 RX ORDER — GENTAMICIN SULFATE 1 MG/G
OINTMENT TOPICAL ONCE
OUTPATIENT
Start: 2024-08-13 | End: 2024-08-13

## 2024-08-13 NOTE — PATIENT INSTRUCTIONS
PHYSICIAN ORDERS AND DISCHARGE INSTRUCTIONS     Wound cleansing:              Do not scrub or use excessive force.             Wash hands with soap and water before and after dressing changes.             Prior to applying a clean dressing, cleanse wound with normal saline,               wound cleanser, or mild soap and water.              Ask the physician or nurse before getting the wound(s) wet in a shower                      Wound Care Notes:  Sees Dr Kumar.                  Donated Kerecis applied to left lateral foot 24                           Orders for this week: 2024     Applied for Kerecis grafts on 24    Fax to [facility]! Reapply wound vac if it becomes disconnected ASAP even if macerated     Left Lateral Distal plantar (larger) wound: WOUND VAC THERAPY:  SKIN PREP OR MASTISOL, THEN DUODERM TO PERIWOUND FOR PROTECTION.   Bolster proximal wound together with drape after foam is placed  Applied Donated Kerecis graft to left lateral foot   BLACK FOAM  over graft TO depth of WOUND. SECURE VAC DRESSING WITH DRAPE.  SET WOUND VAC  CONTINUOUS SUCTION.   CANISTER CHANGE WEEKLY OR ACCORDING TO VOLUME OF DRAINAGE.  Wrap with coban 2 full (may use ace wrap if coban 2 full not available) , but do not compress vac tubing against skin.  WOUND VAC DRESSING TO BE CHANGED BY HOME CARE ON SATURDAY (OR ).  WOUND CARE CENTER WILL CHANGE ON  (NEED TO BRING VAC SUPPLIES TO APPOINTMENTS - CANISTER AND BLACK FOAM SUCTION KIT).        Plan:         Follow Up Instructions: At the Wound Care Center in 1 week  Primary Wound Care Provider: Shelly Rubalcava CNP  Call  for any questions or concerns.  Central [phone] for imaging lab work

## 2024-08-13 NOTE — PROGRESS NOTES
Wound Care Center Progress Visit      Yovanny Levine  AGE: 49 y.o.   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 male who presents to the Wound Clinic for evaluation and treatment of Chronic diabetic  and  surgical ulcer(s) of the left foot. The condition is of marked severity. The patient has been seeing Dr. Webber at the wound clinic since 24. He was hospitalized -24  Sepsis secondary to left foot cellulitis and chronic left diabetic foot ulcer. General surgery consulted, patient underwent incision and drainage of left foot  and again 2/10/24, IV antibiotics given, home on Augmentin through 3/20/24.  Hospitalized 3/12-3/20/24 with diabetic foot infection, IV antibiotics, switched to oral Cipro. Hospitalized -24 with I&D and foot debridement per Dr. Forrest 24 with wound vac placement. Osteomyelitis, infectious disease consult: plan for vancomycin with dialysis for 6-week cumulative course with end date of 24. The patient has significant underlying medical conditions as below. José Luis Izquierdo MD is PCP.    Wound Pain Timing/Severity: waxing and waning, moderate  Quality of pain: shooting, pins and needles  Severity of pain:  5 / 10   Modifying Factors: diabetes, chronic pressure, shear force, obesity, and

## 2024-08-14 NOTE — PROGRESS NOTES
Kerecis Omega3 Treatment Note    NAME:  Yovanny Levine  YOB: 1975  MEDICAL RECORD NUMBER:  [id number]  DATE:  8/13/2024    Goal:  Patient will receive safe and proper application of skin substitute. Patient will comply with caring for dressing, and reporting complications.     The expiration date is checked immediately before use., The package was intact prior to use and no damage was noted., Kerecis Omega3 is stored at room temperature.,  Kerecis Omega3 was hydrated with sterile normal saline per the provider.,  Kerecis Omega3 was removed from protective sterile packaging by the provider and applied to the prepared ulcer bed. ,  Kerecis Omega3 was applied to Left Lateral Foot  , Keresis Omega 3 and affixed with steri-strips by the provider.,  Kerecis Omega3 was covered with non-adherent ulcer dressing. , Applied Black foam NPWT and Coban 2 over non-adherent., Applied dry gauze and/or roll gauze. , Applied appropriate off-loading device., The patient/caregiver was instructed not to remove the dressing and to keep it clean and dry., The patient/family/caregiver was instructed on the need for offloading and elevation of the affected extremity and on using the prescribed offloading device., and The patient/family/caregiver was instructed on signs and symptoms of complication to report, such as draining through dressing, dressing falling down/slipping, getting wet, or severe pain or tingling.     Guidelines followed  Kerecis may only be used every 12 months per wound.      Date of first application of Kerecis Omega 3 for this current wound is August 13, 2024.    Donated Kerecis applied to left foot wound on 8/13/24  Application #  0  out of 10    Electronically signed by Fernando Cooper RN on 8/14/2024 at 3:38 PM

## 2024-08-20 ENCOUNTER — HOSPITAL ENCOUNTER (OUTPATIENT)
Dept: WOUND CARE | Age: 49
Discharge: HOME | End: 2024-08-20
Attending: STUDENT IN AN ORGANIZED HEALTH CARE EDUCATION/TRAINING PROGRAM
Payer: MEDICARE

## 2024-08-20 DIAGNOSIS — E66.01 MORBID OBESITY WITH BODY MASS INDEX (BMI) OF 40.0 TO 44.9 IN ADULT (HCC): ICD-10-CM

## 2024-08-20 DIAGNOSIS — G89.29 CHRONIC FOOT PAIN, LEFT: ICD-10-CM

## 2024-08-20 DIAGNOSIS — Z99.2 ESRD (END STAGE RENAL DISEASE) ON DIALYSIS (HCC): ICD-10-CM

## 2024-08-20 DIAGNOSIS — L97.425 DIABETIC ULCER OF LEFT MIDFOOT ASSOCIATED WITH TYPE 2 DIABETES MELLITUS, WITH MUSCLE INVOLVEMENT WITHOUT EVIDENCE OF NECROSIS (HCC): ICD-10-CM

## 2024-08-20 DIAGNOSIS — M79.672 CHRONIC FOOT PAIN, LEFT: ICD-10-CM

## 2024-08-20 DIAGNOSIS — Z89.422 ABSENCE OF TOE OF LEFT FOOT (HCC): Primary | ICD-10-CM

## 2024-08-20 DIAGNOSIS — M25.562 ACUTE PAIN OF LEFT KNEE: ICD-10-CM

## 2024-08-20 DIAGNOSIS — Z78.9 PRESENCE OF SURGICAL INCISION: ICD-10-CM

## 2024-08-20 DIAGNOSIS — N18.6 ESRD (END STAGE RENAL DISEASE) ON DIALYSIS (HCC): ICD-10-CM

## 2024-08-20 DIAGNOSIS — E11.621 DIABETIC ULCER OF LEFT MIDFOOT ASSOCIATED WITH TYPE 2 DIABETES MELLITUS, WITH MUSCLE INVOLVEMENT WITHOUT EVIDENCE OF NECROSIS (HCC): ICD-10-CM

## 2024-08-20 PROCEDURE — 11043 DBRDMT MUSC&/FSCA 1ST 20/<: CPT | Performed by: NURSE PRACTITIONER

## 2024-08-20 PROCEDURE — 11043 DBRDMT MUSC&/FSCA 1ST 20/<: CPT

## 2024-08-20 PROCEDURE — 97605 NEG PRS WND THER DME<=50SQCM: CPT

## 2024-08-20 PROCEDURE — 11042 DBRDMT SUBQ TIS 1ST 20SQCM/<: CPT

## 2024-08-20 RX ORDER — CLOBETASOL PROPIONATE 0.5 MG/G
OINTMENT TOPICAL ONCE
OUTPATIENT
Start: 2024-08-20 | End: 2024-08-20

## 2024-08-20 RX ORDER — LIDOCAINE 50 MG/G
OINTMENT TOPICAL ONCE
OUTPATIENT
Start: 2024-08-20 | End: 2024-08-20

## 2024-08-20 RX ORDER — LIDOCAINE HYDROCHLORIDE 20 MG/ML
JELLY TOPICAL ONCE
OUTPATIENT
Start: 2024-08-20 | End: 2024-08-20

## 2024-08-20 RX ORDER — LIDOCAINE HYDROCHLORIDE 40 MG/ML
SOLUTION TOPICAL ONCE
OUTPATIENT
Start: 2024-08-20 | End: 2024-08-20

## 2024-08-20 RX ORDER — SODIUM CHLOR/HYPOCHLOROUS ACID 0.033 %
SOLUTION, IRRIGATION IRRIGATION ONCE
OUTPATIENT
Start: 2024-08-20 | End: 2024-08-20

## 2024-08-20 RX ORDER — IBUPROFEN 200 MG
TABLET ORAL ONCE
OUTPATIENT
Start: 2024-08-20 | End: 2024-08-20

## 2024-08-20 RX ORDER — GENTAMICIN SULFATE 1 MG/G
OINTMENT TOPICAL ONCE
OUTPATIENT
Start: 2024-08-20 | End: 2024-08-20

## 2024-08-20 RX ORDER — BACITRACIN ZINC 500 [USP'U]/G
OINTMENT TOPICAL ONCE
OUTPATIENT
Start: 2024-08-20 | End: 2024-08-20

## 2024-08-20 RX ORDER — TRIAMCINOLONE ACETONIDE 1 MG/G
OINTMENT TOPICAL ONCE
OUTPATIENT
Start: 2024-08-20 | End: 2024-08-20

## 2024-08-20 RX ORDER — OXYCODONE AND ACETAMINOPHEN 7.5; 325 MG/1; MG/1
1 TABLET ORAL EVERY 8 HOURS PRN
Qty: 42 TABLET | Refills: 0 | Status: SHIPPED | OUTPATIENT
Start: 2024-08-20 | End: 2024-09-03

## 2024-08-20 RX ORDER — BACITRACIN ZINC AND POLYMYXIN B SULFATE 500; 1000 [USP'U]/G; [USP'U]/G
OINTMENT TOPICAL ONCE
OUTPATIENT
Start: 2024-08-20 | End: 2024-08-20

## 2024-08-20 RX ORDER — BETAMETHASONE DIPROPIONATE 0.5 MG/G
CREAM TOPICAL ONCE
OUTPATIENT
Start: 2024-08-20 | End: 2024-08-20

## 2024-08-20 RX ORDER — LIDOCAINE 40 MG/G
CREAM TOPICAL ONCE
OUTPATIENT
Start: 2024-08-20 | End: 2024-08-20

## 2024-08-20 ASSESSMENT — PAIN DESCRIPTION - LOCATION: LOCATION: FOOT

## 2024-08-20 ASSESSMENT — PAIN DESCRIPTION - ORIENTATION: ORIENTATION: LEFT

## 2024-08-20 ASSESSMENT — PAIN SCALES - GENERAL: PAINLEVEL_OUTOF10: 6

## 2024-08-20 NOTE — PROGRESS NOTES
Negative Pressure Wound Therapy    NAME:  Yovanny Levine  YOB: 1975  MEDICAL RECORD NUMBER:  [id number]  DATE:  8/20/2024    Applied Negative Pressure to left foot amp site.  [x] Applied skin barrier prep to mao-wound.   [x] Cut strips of plastic drape to picture frame wound so that mao-wound is     covered with the drape.   [x] If bridging dressing to less prominent site, cover any intact skin that will come in contact with the Negative Pressure Therapy sponge, gauze or channel drain with plastic drape. The sponge should never touch intact skin.   [x] Cut sponge, gauze or channel drain to size which will fit into the wound/ulcer bed without being forced.   [x] Be sure the sponge is large enough to hold the entire round plastic flange which is attached to the tubing. Never allow flange to be larger than the sponge or it will produce suction damaging intact skin.  Total number of individual pieces of foam used within the wound bed: 1    [x] If bridging the dressing away from the primary site, be sure the bridge leads to a piece of sponge large enough to hold the entire flange without allowing any of the flange to overlap onto intact skin.   [x] Covered sponge, gauze or channel drain with plastic drape.   [x] Cut a hole in this plastic drape directly over the sponge the same size as the plastic drain tubing.   [x] Removed plastic liner from flange and apply it directly over the hole you cut.   [x] Removed the plastic cover from the flange.   [x] Attached the tubing to the wound/ulcer Negative Pressure Therapy and turn it on to be sure a vacuum is created and that there are no leaks.   [x] If air leaks occur, use plastic drape to patch them.   [x] Secured Negative Pressure Therapy dressing with ace wrap loosely if located on an extremity. Maintain tubing outside of ace wrap. Tubing must not exert pressure on intact skin.    Applied per  Guidelines      Electronically signed by Shila ALLAN 
06/14/2024    CL 98 (L) 06/14/2024    CO2 26 06/14/2024    BUN 49 (H) 06/14/2024    CREATININE 5.9 (H) 06/14/2024    GLUCOSE 156 (H) 06/14/2024    CALCIUM 9.5 06/14/2024    LABALBU 48 03/27/2021    BILITOT 0.5 06/07/2024    ALKPHOS 88 06/07/2024    AST 21 06/07/2024    ALT 16 06/07/2024    LABGLOM 11 (L) 06/14/2024    GFRAA 10 (L) 10/03/2022        [] Yes (current treatment in place) [x]  No  Wound Free from infection    No redness, swelling, odor or abnormal drainage  Culture   Date Value Ref Range Status   06/12/2024   Final    Final Report Mixed skin alexsander,  Rare growth No further workup No anaerobes isolated   06/12/2024 SERRATIA MARCESCENS Light growth (A)  Final        [x] Yes []  No  Is wound free of eschar, slough, and/or [place]?      [] Yes [x]  No  Malignant Process in Wound      [x] Yes []  No  Hemoglobin A1C in the last 3 months?    Hemoglobin A1C   Date Value Ref Range Status   10/11/2023 5.5 4.2 - 6.3 % Final        [x] Yes []  No  Off loading Diabetic Foot Ulcer?    [] N/A (off loading not indicated for this wound type)    [] Off loading shoe   [] Non-weight bearing with device   [x] Within the wound dressing    [] Total Contact Cast    [x] Yes []  No []  N/A (No etiology such as venous or lymphedema requiring compression is being treated)   Compression Therapy of 20-30 mmHg or Greater for at least 4 weeks  [] Home compression socks of at least 20-30 mmHg  [x] Multilayer compression wraps  []  Juxtalite compression system  [] Farrow wrap    Plan:     Patient Instructions   PHYSICIAN ORDERS AND DISCHARGE INSTRUCTIONS     Wound cleansing:              Do not scrub or use excessive force.             Wash hands with soap and water before and after dressing changes.             Prior to applying a clean dressing, cleanse wound with normal saline,               wound cleanser, or mild soap and water.              Ask the physician or nurse before getting the wound(s) wet in a shower

## 2024-08-20 NOTE — PATIENT INSTRUCTIONS
PHYSICIAN ORDERS AND DISCHARGE INSTRUCTIONS     Wound cleansing:              Do not scrub or use excessive force.             Wash hands with soap and water before and after dressing changes.             Prior to applying a clean dressing, cleanse wound with normal saline,               wound cleanser, or mild soap and water.              Ask the physician or nurse before getting the wound(s) wet in a shower                      Wound Care Notes:  Sees Dr Kumar.                  Donated Kerecis applied to left lateral foot 24                           Orders for this week: 2024     Applied for Kerecis grafts on 24    Fax to [facility]! Reapply wound vac if it becomes disconnected ASAP even if macerated     Left Lateral Distal plantar (larger) wound: WOUND VAC THERAPY:  SKIN PREP OR MASTISOL, THEN DUODERM TO PERIWOUND FOR PROTECTION.   Puracol packed into deeper area of wound, sprinkle ColActive powder to shallower area.  Bolster proximal wound together with drape after foam is placed  BLACK FOAM TO WOUND. SECURE VAC DRESSING WITH DRAPE.  SET WOUND VAC  CONTINUOUS SUCTION.   CANISTER CHANGE WEEKLY OR ACCORDING TO VOLUME OF DRAINAGE.  Wrap with coban 2 full (may use ace wrap if coban 2 full not available) , but do not compress vac tubing against skin.  WOUND VAC DRESSING TO BE CHANGED BY HOME CARE ON SATURDAY (OR ).  WOUND CARE CENTER WILL CHANGE ON  (NEED TO BRING VAC SUPPLIES TO APPOINTMENTS - CANISTER AND BLACK FOAM SUCTION KIT).        Plan:         Follow Up Instructions: At the Wound Care Center in 1 week  Primary Wound Care Provider: Shelly Rubalcava CNP  Call  for any questions or concerns.  Central [phone] for imaging lab work

## 2024-08-27 ENCOUNTER — HOSPITAL ENCOUNTER (OUTPATIENT)
Dept: WOUND CARE | Age: 49
Discharge: HOME | End: 2024-08-27
Attending: STUDENT IN AN ORGANIZED HEALTH CARE EDUCATION/TRAINING PROGRAM
Payer: MEDICARE

## 2024-08-27 ENCOUNTER — HOSPITAL ENCOUNTER (INPATIENT)
Age: 49
LOS: 7 days | Discharge: HOME HEALTH | DRG: 982 | End: 2024-09-03
Attending: STUDENT IN AN ORGANIZED HEALTH CARE EDUCATION/TRAINING PROGRAM
Payer: MEDICARE

## 2024-08-27 ENCOUNTER — APPOINTMENT (OUTPATIENT)
Dept: GENERAL RADIOLOGY | Age: 49
DRG: 982 | End: 2024-08-27
Payer: MEDICARE

## 2024-08-27 ENCOUNTER — APPOINTMENT (OUTPATIENT)
Dept: ULTRASOUND IMAGING | Age: 49
DRG: 982 | End: 2024-08-27
Payer: MEDICARE

## 2024-08-27 VITALS — RESPIRATION RATE: 20 BRPM | TEMPERATURE: 98.5 F | DIASTOLIC BLOOD PRESSURE: 95 MMHG | SYSTOLIC BLOOD PRESSURE: 158 MMHG

## 2024-08-27 DIAGNOSIS — Z99.2 STAGE 5 CHRONIC KIDNEY DISEASE ON CHRONIC DIALYSIS (HCC): ICD-10-CM

## 2024-08-27 DIAGNOSIS — M86.9 OSTEOMYELITIS OF LEFT FOOT, UNSPECIFIED TYPE: ICD-10-CM

## 2024-08-27 DIAGNOSIS — M25.562 ACUTE PAIN OF LEFT KNEE: Primary | ICD-10-CM

## 2024-08-27 DIAGNOSIS — Z99.2 TYPE 2 DIABETES MELLITUS WITH CHRONIC KIDNEY DISEASE ON CHRONIC DIALYSIS, WITH LONG-TERM CURRENT USE OF INSULIN (HCC): ICD-10-CM

## 2024-08-27 DIAGNOSIS — R78.81 BACTEREMIA: ICD-10-CM

## 2024-08-27 DIAGNOSIS — Z79.4 TYPE 2 DIABETES MELLITUS WITH CHRONIC KIDNEY DISEASE ON CHRONIC DIALYSIS, WITH LONG-TERM CURRENT USE OF INSULIN (HCC): ICD-10-CM

## 2024-08-27 DIAGNOSIS — E11.621 DIABETIC ULCER OF LEFT MIDFOOT ASSOCIATED WITH TYPE 2 DIABETES MELLITUS, WITH MUSCLE INVOLVEMENT WITHOUT EVIDENCE OF NECROSIS (HCC): ICD-10-CM

## 2024-08-27 DIAGNOSIS — N18.6 TYPE 2 DIABETES MELLITUS WITH CHRONIC KIDNEY DISEASE ON CHRONIC DIALYSIS, WITH LONG-TERM CURRENT USE OF INSULIN (HCC): ICD-10-CM

## 2024-08-27 DIAGNOSIS — Z99.2 ESRD (END STAGE RENAL DISEASE) ON DIALYSIS (HCC): ICD-10-CM

## 2024-08-27 DIAGNOSIS — N18.6 STAGE 5 CHRONIC KIDNEY DISEASE ON CHRONIC DIALYSIS (HCC): ICD-10-CM

## 2024-08-27 DIAGNOSIS — E11.22 TYPE 2 DIABETES MELLITUS WITH CHRONIC KIDNEY DISEASE ON CHRONIC DIALYSIS, WITH LONG-TERM CURRENT USE OF INSULIN (HCC): ICD-10-CM

## 2024-08-27 DIAGNOSIS — L97.425 DIABETIC ULCER OF LEFT MIDFOOT ASSOCIATED WITH TYPE 2 DIABETES MELLITUS, WITH MUSCLE INVOLVEMENT WITHOUT EVIDENCE OF NECROSIS (HCC): ICD-10-CM

## 2024-08-27 DIAGNOSIS — M79.662 PAIN IN LEFT LOWER LEG: ICD-10-CM

## 2024-08-27 DIAGNOSIS — N18.6 ESRD (END STAGE RENAL DISEASE) ON DIALYSIS (HCC): ICD-10-CM

## 2024-08-27 DIAGNOSIS — M79.605 LEFT LEG PAIN: Primary | ICD-10-CM

## 2024-08-27 LAB
ALBUMIN SERPL-MCNC: 3.7 GM/DL (ref 3.4–5)
ALP BLD-CCNC: 71 IU/L (ref 40–128)
ALT SERPL-CCNC: 19 U/L (ref 10–40)
ANION GAP SERPL CALCULATED.3IONS-SCNC: 17 MMOL/L (ref 7–16)
AST SERPL-CCNC: 22 IU/L (ref 15–37)
BASOPHILS ABSOLUTE: 0 K/CU MM
BASOPHILS RELATIVE PERCENT: 0.3 % (ref 0–1)
BILIRUB SERPL-MCNC: 0.6 MG/DL (ref 0–1)
BUN SERPL-MCNC: 68 MG/DL (ref 6–23)
CALCIUM SERPL-MCNC: 8 MG/DL (ref 8.3–10.6)
CHLORIDE BLD-SCNC: 91 MMOL/L (ref 99–110)
CO2: 26 MMOL/L (ref 21–32)
CREAT SERPL-MCNC: 7.2 MG/DL (ref 0.9–1.3)
DIFFERENTIAL TYPE: ABNORMAL
EOSINOPHILS ABSOLUTE: 0.1 K/CU MM
EOSINOPHILS RELATIVE PERCENT: 1.4 % (ref 0–3)
GFR, ESTIMATED: 9 ML/MIN/1.73M2
GLUCOSE BLD-MCNC: 158 MG/DL (ref 70–99)
GLUCOSE SERPL-MCNC: 207 MG/DL (ref 70–99)
HCT VFR BLD CALC: 27.2 % (ref 42–52)
HEMOGLOBIN: 8.8 GM/DL (ref 13.5–18)
IMMATURE NEUTROPHIL %: 0.3 % (ref 0–0.43)
LACTIC ACID, SEPSIS: 0.9 MMOL/L (ref 0.4–2)
LYMPHOCYTES ABSOLUTE: 0.3 K/CU MM
LYMPHOCYTES RELATIVE PERCENT: 3.5 % (ref 24–44)
MCH RBC QN AUTO: 30.1 PG (ref 27–31)
MCHC RBC AUTO-ENTMCNC: 32.4 % (ref 32–36)
MCV RBC AUTO: 93.2 FL (ref 78–100)
MONOCYTES ABSOLUTE: 0.7 K/CU MM
MONOCYTES RELATIVE PERCENT: 7.9 % (ref 0–4)
NEUTROPHILS ABSOLUTE: 7.9 K/CU MM
NEUTROPHILS RELATIVE PERCENT: 86.6 % (ref 36–66)
NUCLEATED RBC %: 0 %
PDW BLD-RTO: 15.7 % (ref 11.7–14.9)
PLATELET # BLD: 187 K/CU MM (ref 140–440)
PMV BLD AUTO: 10.1 FL (ref 7.5–11.1)
POTASSIUM SERPL-SCNC: 4.4 MMOL/L (ref 3.5–5.1)
RBC # BLD: 2.92 M/CU MM (ref 4.6–6.2)
SODIUM BLD-SCNC: 134 MMOL/L (ref 135–145)
TOTAL IMMATURE NEUTOROPHIL: 0.03 K/CU MM
TOTAL NUCLEATED RBC: 0 K/CU MM
TOTAL PROTEIN: 7.9 GM/DL (ref 6.4–8.2)
WBC # BLD: 9.1 K/CU MM (ref 4–10.5)

## 2024-08-27 PROCEDURE — 96375 TX/PRO/DX INJ NEW DRUG ADDON: CPT

## 2024-08-27 PROCEDURE — 6360000002 HC RX W HCPCS: Performed by: NURSE PRACTITIONER

## 2024-08-27 PROCEDURE — 11042 DBRDMT SUBQ TIS 1ST 20SQCM/<: CPT | Performed by: NURSE PRACTITIONER

## 2024-08-27 PROCEDURE — 11042 DBRDMT SUBQ TIS 1ST 20SQCM/<: CPT

## 2024-08-27 PROCEDURE — 6370000000 HC RX 637 (ALT 250 FOR IP): Performed by: STUDENT IN AN ORGANIZED HEALTH CARE EDUCATION/TRAINING PROGRAM

## 2024-08-27 PROCEDURE — 0KDW0ZZ EXTRACTION OF LEFT FOOT MUSCLE, OPEN APPROACH: ICD-10-PCS | Performed by: STUDENT IN AN ORGANIZED HEALTH CARE EDUCATION/TRAINING PROGRAM

## 2024-08-27 PROCEDURE — 85025 COMPLETE CBC W/AUTO DIFF WBC: CPT

## 2024-08-27 PROCEDURE — 73630 X-RAY EXAM OF FOOT: CPT

## 2024-08-27 PROCEDURE — 93971 EXTREMITY STUDY: CPT

## 2024-08-27 PROCEDURE — 83036 HEMOGLOBIN GLYCOSYLATED A1C: CPT

## 2024-08-27 PROCEDURE — 0KBW0ZZ EXCISION OF LEFT FOOT MUSCLE, OPEN APPROACH: ICD-10-PCS | Performed by: STUDENT IN AN ORGANIZED HEALTH CARE EDUCATION/TRAINING PROGRAM

## 2024-08-27 PROCEDURE — 83605 ASSAY OF LACTIC ACID: CPT

## 2024-08-27 PROCEDURE — 6360000002 HC RX W HCPCS: Performed by: STUDENT IN AN ORGANIZED HEALTH CARE EDUCATION/TRAINING PROGRAM

## 2024-08-27 PROCEDURE — 99285 EMERGENCY DEPT VISIT HI MDM: CPT

## 2024-08-27 PROCEDURE — 6360000002 HC RX W HCPCS

## 2024-08-27 PROCEDURE — 87150 DNA/RNA AMPLIFIED PROBE: CPT

## 2024-08-27 PROCEDURE — 99214 OFFICE O/P EST MOD 30 MIN: CPT | Performed by: NURSE PRACTITIONER

## 2024-08-27 PROCEDURE — 87086 URINE CULTURE/COLONY COUNT: CPT

## 2024-08-27 PROCEDURE — 80053 COMPREHEN METABOLIC PANEL: CPT

## 2024-08-27 PROCEDURE — 87186 SC STD MICRODIL/AGAR DIL: CPT

## 2024-08-27 PROCEDURE — 1200000000 HC SEMI PRIVATE

## 2024-08-27 PROCEDURE — 82962 GLUCOSE BLOOD TEST: CPT

## 2024-08-27 PROCEDURE — 36415 COLL VENOUS BLD VENIPUNCTURE: CPT

## 2024-08-27 PROCEDURE — 87040 BLOOD CULTURE FOR BACTERIA: CPT

## 2024-08-27 PROCEDURE — 2580000003 HC RX 258: Performed by: STUDENT IN AN ORGANIZED HEALTH CARE EDUCATION/TRAINING PROGRAM

## 2024-08-27 PROCEDURE — 2580000003 HC RX 258

## 2024-08-27 PROCEDURE — 96374 THER/PROPH/DIAG INJ IV PUSH: CPT

## 2024-08-27 RX ORDER — DEXTROSE MONOHYDRATE 100 MG/ML
INJECTION, SOLUTION INTRAVENOUS CONTINUOUS PRN
Status: DISCONTINUED | OUTPATIENT
Start: 2024-08-27 | End: 2024-09-01

## 2024-08-27 RX ORDER — SODIUM CHLORIDE, SODIUM LACTATE, POTASSIUM CHLORIDE, CALCIUM CHLORIDE 600; 310; 30; 20 MG/100ML; MG/100ML; MG/100ML; MG/100ML
INJECTION, SOLUTION INTRAVENOUS CONTINUOUS
Status: DISCONTINUED | OUTPATIENT
Start: 2024-08-27 | End: 2024-08-28 | Stop reason: ALTCHOICE

## 2024-08-27 RX ORDER — POLYETHYLENE GLYCOL 3350 17 G/17G
17 POWDER, FOR SOLUTION ORAL DAILY PRN
Status: DISCONTINUED | OUTPATIENT
Start: 2024-08-27 | End: 2024-09-01

## 2024-08-27 RX ORDER — SODIUM CHLORIDE 0.9 % (FLUSH) 0.9 %
5-40 SYRINGE (ML) INJECTION PRN
Status: DISCONTINUED | OUTPATIENT
Start: 2024-08-27 | End: 2024-09-03 | Stop reason: HOSPADM

## 2024-08-27 RX ORDER — ONDANSETRON 2 MG/ML
4 INJECTION INTRAMUSCULAR; INTRAVENOUS EVERY 6 HOURS PRN
Status: DISCONTINUED | OUTPATIENT
Start: 2024-08-27 | End: 2024-09-03 | Stop reason: HOSPADM

## 2024-08-27 RX ORDER — MORPHINE SULFATE 4 MG/ML
4 INJECTION, SOLUTION INTRAMUSCULAR; INTRAVENOUS ONCE
Status: COMPLETED | OUTPATIENT
Start: 2024-08-27 | End: 2024-08-27

## 2024-08-27 RX ORDER — SODIUM CHLORIDE 0.9 % (FLUSH) 0.9 %
5-40 SYRINGE (ML) INJECTION EVERY 12 HOURS SCHEDULED
Status: DISCONTINUED | OUTPATIENT
Start: 2024-08-27 | End: 2024-09-03 | Stop reason: HOSPADM

## 2024-08-27 RX ORDER — ISOSORBIDE MONONITRATE 30 MG/1
30 TABLET, EXTENDED RELEASE ORAL DAILY
Status: DISCONTINUED | OUTPATIENT
Start: 2024-08-27 | End: 2024-09-01

## 2024-08-27 RX ORDER — ACETAMINOPHEN 325 MG/1
650 TABLET ORAL EVERY 6 HOURS PRN
Status: DISCONTINUED | OUTPATIENT
Start: 2024-08-27 | End: 2024-09-01

## 2024-08-27 RX ORDER — INSULIN GLARGINE 100 [IU]/ML
5 INJECTION, SOLUTION SUBCUTANEOUS NIGHTLY
Status: DISCONTINUED | OUTPATIENT
Start: 2024-08-27 | End: 2024-09-01

## 2024-08-27 RX ORDER — FENTANYL CITRATE 50 UG/ML
50 INJECTION, SOLUTION INTRAMUSCULAR; INTRAVENOUS ONCE
Status: COMPLETED | OUTPATIENT
Start: 2024-08-27 | End: 2024-08-27

## 2024-08-27 RX ORDER — ONDANSETRON 4 MG/1
4 TABLET, ORALLY DISINTEGRATING ORAL EVERY 8 HOURS PRN
Status: DISCONTINUED | OUTPATIENT
Start: 2024-08-27 | End: 2024-09-03 | Stop reason: HOSPADM

## 2024-08-27 RX ORDER — INSULIN LISPRO 100 [IU]/ML
0-4 INJECTION, SOLUTION INTRAVENOUS; SUBCUTANEOUS
Status: DISCONTINUED | OUTPATIENT
Start: 2024-08-28 | End: 2024-09-01

## 2024-08-27 RX ORDER — HYDROMORPHONE HYDROCHLORIDE 1 MG/ML
0.5 INJECTION, SOLUTION INTRAMUSCULAR; INTRAVENOUS; SUBCUTANEOUS EVERY 4 HOURS PRN
Status: DISCONTINUED | OUTPATIENT
Start: 2024-08-27 | End: 2024-08-27

## 2024-08-27 RX ORDER — ACETAMINOPHEN 650 MG/1
650 SUPPOSITORY RECTAL EVERY 6 HOURS PRN
Status: DISCONTINUED | OUTPATIENT
Start: 2024-08-27 | End: 2024-09-01

## 2024-08-27 RX ORDER — OXYCODONE AND ACETAMINOPHEN 5; 325 MG/1; MG/1
1 TABLET ORAL EVERY 6 HOURS PRN
Status: DISCONTINUED | OUTPATIENT
Start: 2024-08-27 | End: 2024-08-28

## 2024-08-27 RX ORDER — CLOPIDOGREL BISULFATE 75 MG/1
75 TABLET ORAL DAILY
Status: DISCONTINUED | OUTPATIENT
Start: 2024-08-27 | End: 2024-09-03 | Stop reason: HOSPADM

## 2024-08-27 RX ORDER — SODIUM CHLORIDE 9 MG/ML
INJECTION, SOLUTION INTRAVENOUS PRN
Status: DISCONTINUED | OUTPATIENT
Start: 2024-08-27 | End: 2024-09-03 | Stop reason: HOSPADM

## 2024-08-27 RX ORDER — ATORVASTATIN CALCIUM 40 MG/1
40 TABLET, FILM COATED ORAL NIGHTLY
Status: DISCONTINUED | OUTPATIENT
Start: 2024-08-27 | End: 2024-09-03 | Stop reason: HOSPADM

## 2024-08-27 RX ORDER — ENOXAPARIN SODIUM 100 MG/ML
30 INJECTION SUBCUTANEOUS DAILY
Status: DISCONTINUED | OUTPATIENT
Start: 2024-08-27 | End: 2024-08-27 | Stop reason: DRUGHIGH

## 2024-08-27 RX ORDER — AMIODARONE HYDROCHLORIDE 200 MG/1
200 TABLET ORAL DAILY
Status: DISCONTINUED | OUTPATIENT
Start: 2024-08-27 | End: 2024-09-03 | Stop reason: HOSPADM

## 2024-08-27 RX ORDER — AMLODIPINE BESYLATE 5 MG/1
5 TABLET ORAL DAILY
Status: DISCONTINUED | OUTPATIENT
Start: 2024-08-27 | End: 2024-09-03 | Stop reason: HOSPADM

## 2024-08-27 RX ORDER — INSULIN LISPRO 100 [IU]/ML
INJECTION, SOLUTION INTRAVENOUS; SUBCUTANEOUS
COMMUNITY

## 2024-08-27 RX ORDER — INSULIN LISPRO 100 [IU]/ML
0-4 INJECTION, SOLUTION INTRAVENOUS; SUBCUTANEOUS NIGHTLY
Status: DISCONTINUED | OUTPATIENT
Start: 2024-08-27 | End: 2024-09-01

## 2024-08-27 RX ORDER — HYDRALAZINE HYDROCHLORIDE 25 MG/1
25 TABLET, FILM COATED ORAL EVERY 8 HOURS SCHEDULED
Status: DISCONTINUED | OUTPATIENT
Start: 2024-08-27 | End: 2024-09-03 | Stop reason: HOSPADM

## 2024-08-27 RX ORDER — HEPARIN SODIUM 5000 [USP'U]/ML
5000 INJECTION, SOLUTION INTRAVENOUS; SUBCUTANEOUS EVERY 8 HOURS SCHEDULED
Status: DISCONTINUED | OUTPATIENT
Start: 2024-08-27 | End: 2024-09-03 | Stop reason: HOSPADM

## 2024-08-27 RX ORDER — GLUCAGON 1 MG/ML
1 KIT INJECTION PRN
Status: DISCONTINUED | OUTPATIENT
Start: 2024-08-27 | End: 2024-09-01

## 2024-08-27 RX ADMIN — FENTANYL CITRATE 50 MCG: 50 INJECTION, SOLUTION INTRAMUSCULAR; INTRAVENOUS at 17:41

## 2024-08-27 RX ADMIN — HYDRALAZINE HYDROCHLORIDE 25 MG: 25 TABLET ORAL at 22:06

## 2024-08-27 RX ADMIN — INSULIN GLARGINE 5 UNITS: 100 INJECTION, SOLUTION SUBCUTANEOUS at 22:06

## 2024-08-27 RX ADMIN — ATORVASTATIN CALCIUM 40 MG: 40 TABLET, FILM COATED ORAL at 22:06

## 2024-08-27 RX ADMIN — HYDROMORPHONE HYDROCHLORIDE 0.5 MG: 1 INJECTION, SOLUTION INTRAMUSCULAR; INTRAVENOUS; SUBCUTANEOUS at 22:08

## 2024-08-27 RX ADMIN — HEPARIN SODIUM 5000 UNITS: 5000 INJECTION INTRAVENOUS; SUBCUTANEOUS at 22:08

## 2024-08-27 RX ADMIN — SODIUM CHLORIDE, PRESERVATIVE FREE 10 ML: 5 INJECTION INTRAVENOUS at 22:07

## 2024-08-27 RX ADMIN — CEFEPIME 1000 MG: 1 INJECTION, POWDER, FOR SOLUTION INTRAMUSCULAR; INTRAVENOUS at 22:36

## 2024-08-27 RX ADMIN — SODIUM CHLORIDE, POTASSIUM CHLORIDE, SODIUM LACTATE AND CALCIUM CHLORIDE: 600; 310; 30; 20 INJECTION, SOLUTION INTRAVENOUS at 22:30

## 2024-08-27 RX ADMIN — VANCOMYCIN HYDROCHLORIDE 2000 MG: 5 INJECTION, POWDER, LYOPHILIZED, FOR SOLUTION INTRAVENOUS at 23:35

## 2024-08-27 RX ADMIN — MORPHINE SULFATE 4 MG: 4 INJECTION, SOLUTION INTRAMUSCULAR; INTRAVENOUS at 16:29

## 2024-08-27 ASSESSMENT — PAIN SCALES - GENERAL
PAINLEVEL_OUTOF10: 9
PAINLEVEL_OUTOF10: 9
PAINLEVEL_OUTOF10: 7
PAINLEVEL_OUTOF10: 9
PAINLEVEL_OUTOF10: 6
PAINLEVEL_OUTOF10: 7
PAINLEVEL_OUTOF10: 9
PAINLEVEL_OUTOF10: 8

## 2024-08-27 ASSESSMENT — PAIN - FUNCTIONAL ASSESSMENT
PAIN_FUNCTIONAL_ASSESSMENT: ACTIVITIES ARE NOT PREVENTED
PAIN_FUNCTIONAL_ASSESSMENT: ACTIVITIES ARE NOT PREVENTED
PAIN_FUNCTIONAL_ASSESSMENT: PREVENTS OR INTERFERES SOME ACTIVE ACTIVITIES AND ADLS
PAIN_FUNCTIONAL_ASSESSMENT: PREVENTS OR INTERFERES SOME ACTIVE ACTIVITIES AND ADLS
PAIN_FUNCTIONAL_ASSESSMENT: 0-10

## 2024-08-27 ASSESSMENT — PAIN DESCRIPTION - LOCATION
LOCATION: FOOT;LEG
LOCATION: LEG
LOCATION: FOOT;LEG
LOCATION: LEG;FOOT
LOCATION: LEG
LOCATION: FOOT;LEG

## 2024-08-27 ASSESSMENT — PAIN DESCRIPTION - ORIENTATION
ORIENTATION: LEFT

## 2024-08-27 ASSESSMENT — PAIN DESCRIPTION - FREQUENCY
FREQUENCY: CONTINUOUS

## 2024-08-27 ASSESSMENT — PAIN DESCRIPTION - ONSET
ONSET: ON-GOING

## 2024-08-27 ASSESSMENT — PAIN DESCRIPTION - PAIN TYPE
TYPE: ACUTE PAIN

## 2024-08-27 ASSESSMENT — PAIN DESCRIPTION - DESCRIPTORS
DESCRIPTORS: SHARP;BURNING
DESCRIPTORS: ACHING;SHOOTING
DESCRIPTORS: SHARP
DESCRIPTORS: SHARP;BURNING

## 2024-08-27 NOTE — H&P
V2.0  History and Physical      Name:  Yovanny Levine /Age/Sex: 1975  (49 y.o. male)   MRN & CSN:  [id number] & [id number] Encounter Date/Time: 2024 6:54 PM EDT   Location:  ED14/ED-14 PCP: José Luis Izquierdo MD       Hospital Day: 1    Assessment and Plan:   Yovanny Levine is a 49 y.o. male who presents with Osteomyelitis (formerly [facility])    Hospital Problems             Last Modified POA    * (Principal) Osteomyelitis (formerly [facility]) 2024 Yes       Intractable left foot pain with left foot x-ray showing signs of lateral midfoot ulceration concerning for possible osteomyelitis.  MRI of the left foot to be ordered.  Started on vancomycin and cefepime de-escalate antibiotic based on culture results.  Infectious disease to be consulted.  Considering surgery consultation if confirmed on MRI  ESRD on hemodialysis neurology has been consulted  History of chronic left-sided foot osteomyelitis patient has a wound VAC in place since last admission.  Previously general surgery had no surgical interventions and wound cultures were growing Serratia and the patient was switched to Bactrim for 6 weeks last discharge was on 2024.  Coronary artery disease with history of PCI and stenting to LAD and LCx continue Plavix statin and Coreg  Diabetes mellitus insulin-dependent start the patient on Lantus of 10 units and sliding scale insulin monitor for hypoglycemia  Hyperlipidemia on statin  Chronic pain disorder on home Percocet  Essential hypertension on amlodipine carvedilol and hydralazine along with Imdur       Medical Decision Making:  The following items were considered in medical decision making:  Discussion of patient care with other providers  Reviewed clinical lab tests if any  Reviewed radiology tests if any  Reviewed other diagnostic tests/interventions  Independent review of radiologic images if any  Microbiology cultures and other micro tests if any    Estimated time spent for medical decision-making encompassing    Allergies   Allergen Reactions   • Adhesive Tape Rash   • Doxycycline Nausea And Vomiting   • Reglan [Metoclopramide] Anxiety       Medications:   Medications:    Infusions:   PRN Meds:     Labs      CBC:   Recent Labs     08/27/24  1630   WBC 9.1   HGB 8.8*        BMP:    Recent Labs     08/27/24  1630   *   K 4.4   CL 91*   CO2 26   BUN 68*   CREATININE 7.2*   GLUCOSE 207*     Hepatic:   Recent Labs     08/27/24  1630   AST 22   ALT 19   BILITOT 0.6   ALKPHOS 71     Lipids:   Lab Results   Component Value Date/Time    CHOL 166 09/22/2022 02:02 PM    CHOL 168 10/15/2013 10:30 AM    HDL 30 09/22/2022 02:02 PM    TRIG 254 09/22/2022 02:02 PM     Hemoglobin A1C:   Lab Results   Component Value Date/Time    LABA1C 5.5 10/11/2023 05:42 PM     TSH: No results found for: \"TSH\"  Troponin:   Lab Results   Component Value Date/Time    TROPONINT 0.176 02/08/2023 05:45 AM    TROPONINT 0.183 02/07/2023 09:17 PM    TROPONINT 0.155 09/13/2022 11:49 AM     Lactic Acid: No results for input(s): \"LACTA\" in the last 72 hours.  BNP: No results for input(s): \"PROBNP\" in the last 72 hours.  UA:  Lab Results   Component Value Date/Time    NITRU NEGATIVE 08/30/2023 02:00 AM    COLORU YELLOW 08/30/2023 02:00 AM    PHUR 7.0 08/30/2023 02:00 AM    PHUR 6.5 06/20/2013 09:51 AM    WBCUA 2 08/30/2023 02:00 AM    RBCUA <1 08/30/2023 02:00 AM    MUCUS RARE 07/24/2022 11:40 PM    TRICHOMONAS NONE SEEN 08/30/2023 02:00 AM    BACTERIA NEGATIVE 08/30/2023 02:00 AM    CLARITYU CLEAR 08/30/2023 02:00 AM    SPECGRAV 1.025 06/20/2013 09:51 AM    LEUKOCYTESUR NEGATIVE 08/30/2023 02:00 AM    UROBILINOGEN 1.0 08/30/2023 02:00 AM    BILIRUBINUR NEGATIVE 08/30/2023 02:00 AM    BILIRUBINUR neg 06/20/2013 09:51 AM    BLOODU TRACE 08/30/2023 02:00 AM    GLUCOSEU 250 08/30/2023 02:00 AM    GLUCOSEU 1,000 06/20/2013 09:51 AM    KETUA NEGATIVE 08/30/2023 02:00 AM    AMORPHOUS RARE 07/24/2022 11:40 PM     Urine Cultures: No results found for:  \"LABURIN\"  Blood Cultures: No results found for: \"BC\"  No results found for: \"BLOODCULT2\"  Organism:   Lab Results   Component Value Date/Time    ORG HOMAR 01/07/2019 12:08 AM       Imaging/Diagnostics Last 24 Hours   Vascular duplex lower extremity venous left    Result Date: 8/27/2024  Left lower extremity venous ultrasound INDICATION:  Pain and swelling. Left leg pain COMPARISON:  None Doppler ultrasound of the left lower extremity was performed. FINDINGS:  There is normal flow in the great saphenous, common femoral, femoral, and popliteal veins. Normal compression and augmentation is demonstrated. The proximal calf veins are also patent. Additional notation made of mildly prominent nonpathologically enlarged left groin lymph nodes all of which demonstrate a short axis of less than 10 mm.     No evidence of deep venous thrombosis in the left lower extremity Electronically signed by Gonzales Simons MD    XR FOOT LEFT (MIN 3 VIEWS)    Result Date: 8/27/2024  Left Foot INDICATION: Trauma COMPARISON: X-ray foot June 2, 2024 TECHNIQUE: Three views of the left foot were obtained FINDINGS: Redemonstrated amputations from the proximal first metatarsal, at the fourth MTP joint, and the entire fifth ray. Amputation of the second metatarsal head. Soft tissue swelling and ulceration over the lateral midfoot with erosions at the base of the fourth metatarsal and cuboid. No evidence of acute fracture or dislocation.     Redemonstrated amputations described above. Lateral midfoot ulceration with associated erosive changes at the base of the fourth metatarsal and cuboid. Concerning for possible osteomyelitis. Correlate clinically. Consider MRI for further assessment. Electronically signed by DANA BANERJEE      Personally reviewed Lab Studies, Imaging    Electronically signed by Kike Tinoco MD on 8/27/2024 at 6:54 PM

## 2024-08-27 NOTE — PATIENT INSTRUCTIONS
PHYSICIAN ORDERS AND DISCHARGE INSTRUCTIONS     Wound cleansing:              Do not scrub or use excessive force.             Wash hands with soap and water before and after dressing changes.             Prior to applying a clean dressing, cleanse wound with normal saline,               wound cleanser, or mild soap and water.              Ask the physician or nurse before getting the wound(s) wet in a shower                      Wound Care Notes:  Sees Dr Kumar.                  Donated Kerecis applied to left lateral foot 8/13/24                           Orders for this week: 8/27/2024     Applied for Kerecis grafts on 8/13/24    Fax to [facility]! Reapply wound vac if it becomes disconnected ASAP even if macerated     In clinic 8/27/24 Left Lateral Distal plantar (larger) wound: apply puracol ag, ABD, Conform, ace    Left Lateral Distal plantar (larger) wound: WOUND VAC THERAPY: Hold vac 8/27/24> Home care may reapply vac when discharged from Hospital  SKIN PREP OR MASTISOL, THEN DUODERM TO PERIWOUND FOR PROTECTION.   Puracol packed into deeper area of wound, sprinkle ColActive powder to shallower area.  Bolster proximal wound together with drape after foam is placed  BLACK FOAM TO WOUND. SECURE VAC DRESSING WITH DRAPE.  SET WOUND VAC  CONTINUOUS SUCTION.   CANISTER CHANGE WEEKLY OR ACCORDING TO VOLUME OF DRAINAGE.  Wrap with coban 2 full (may use ace wrap if coban 2 full not available) , but do not compress vac tubing against skin.  WOUND VAC DRESSING TO BE CHANGED BY HOME CARE ON SATURDAY (OR SUNDAY and 9/3/24 Tuesday )   WOUND CARE CENTER WILL CHANGE ON TUESDAYS (NEED TO BRING VAC SUPPLIES TO APPOINTMENTS - CANISTER AND BLACK FOAM SUCTION KIT).        Plan: Going to Hospital d/t pain of LLE         2 weeks with fei   Follow Up Instructions: At the Wound Care Center in 1 week  Primary Wound Care Provider: Fei Rubalcava CNP  Call  for any questions or concerns.  Central Scheduling:

## 2024-08-27 NOTE — ED PROVIDER NOTES
Reassessment:     Records Reviewed : Outpatient Notes    Review of notes dated /8/27 2024 reveal \"The patient has been seeing Dr. Webber at the wound clinic since 2/20/24. He was hospitalized 2/6-2/12/24  Sepsis secondary to left foot cellulitis and chronic left diabetic foot ulcer. General surgery consulted, patient underwent incision and drainage of left foot 2/8 and again 2/10/24, IV antibiotics given, home on Augmentin through 3/20/24.  Hospitalized 3/12-3/20/24 with diabetic foot infection, IV antibiotics, switched to oral Cipro. Hospitalized 4/22-4/27/24 with I&D and foot debridement per Dr. Forrest 4/23/24 with wound vac placement. Osteomyelitis, infectious disease consult: plan for vancomycin with dialysis for 6-week cumulative course with end date of 6/4/24.\"     Chart review shows recent radiograph(s):  No results found.    Vitals:    Vitals:    08/27/24 1700 08/27/24 1715 08/27/24 1730 08/27/24 1829   BP: (!) 163/107  (!) 153/93 (!) 160/109   Pulse:    84   Resp:    18   Temp:       SpO2: 100% 100% 98% 100%   Weight:       Height:            Yovanny Levine is a 49 y.o. male who present to the emergency center for left leg pain that is not ongoing the last 2 days..   Pt has easy nonlabored respirations.  Vital signs within acceptable parameters.  Patient is afebrile and in no acute distress. Pt hemodynamically stable and neurovascularly intact.    Patient seen and examined.  Work-up initiated secondary to presentation, physical exam findings, vital signs and medical chart review. Emergent etiologies considered.    Differential diagnosis include, but not limited to, DVT, cellulitis, osteomyelitis, necrotizing fasciitis    Is this patient to be included in the SEP-1 Core Measure due to severe sepsis or septic shock?   No   Exclusion criteria - the patient is NOT to be included for SEP-1 Core Measure due to:  2+ SIRS criteria are not met     His lab is exquisitely tender to palpation from the groin down to the  Earnest and Consultant Dr. Rosales's        Disposition Considerations (include 1 Tests not done, Shared Decision Making, Pt Expectation of Test or Tx.): Escalation of care considered for admission given osteomyelitis on x-ray.   I will admit to [facility] for further treatment and management.  Per our discussion he will initiate antibiotics.    FINAL IMPRESSION      1. Left leg pain    2. Osteomyelitis of left foot, unspecified type ([facility])    3. Stage 5 chronic kidney disease on chronic dialysis ([facility])    4. Type 2 diabetes mellitus with chronic kidney disease on chronic dialysis, with long-term current use of insulin ([facility])          DISPOSITION/PLAN     DISPOSITION Decision To Admit 08/27/2024 06:32:35 PM  Condition at Disposition: Stable    PATIENT REFERRED TO:  No follow-up provider specified.    DISCHARGE MEDICATIONS:  New Prescriptions    No medications on file       DISCONTINUED MEDICATIONS:  Discontinued Medications    No medications on file            (Please note that portions of this note were completed with a voice recognition program.  Efforts were made to edit the dictations but occasionally words are mis-transcribed.)    APPLE Colvin CNP (electronically signed)       Felipa Goodman APRN - CNP  08/27/24 1631

## 2024-08-27 NOTE — ED NOTES
ED TO INPATIENT SBAR HANDOFF    Patient Name: Yovanny Levine   :  1975  49 y.o.   Preferred Name  Yovanny   Family/Caregiver Present no   Restraints no   C-SSRS: Risk of Suicide: No Risk  Sitter no   Sepsis Risk Score        Situation  Chief Complaint   Patient presents with    Leg Pain     States leg pain since yesterday     Brief Description of Patient's Condition: pt presents to the ed with complaints of left leg pain. Pt states that he has a hx of wounds on legs. Pt has absence of a toe on the left foot.   Mental Status: oriented  Arrived from: home    Imaging:   Vascular duplex lower extremity venous left   Final Result   No evidence of deep venous thrombosis in the left lower extremity         Electronically signed by Gonzales Simons MD      XR FOOT LEFT (MIN 3 VIEWS)   Final Result   Redemonstrated amputations described above. Lateral midfoot   ulceration with associated erosive changes at the base of the fourth metatarsal   and cuboid. Concerning for possible osteomyelitis. Correlate clinically.   Consider MRI for further assessment.         Electronically signed by DANA BANERJEE        Abnormal labs:   Abnormal Labs Reviewed   CBC WITH AUTO DIFFERENTIAL - Abnormal; Notable for the following components:       Result Value    RBC 2.92 (*)     Hemoglobin 8.8 (*)     Hematocrit 27.2 (*)     RDW 15.7 (*)     Neutrophils % 86.6 (*)     Lymphocytes % 3.5 (*)     Monocytes % 7.9 (*)     All other components within normal limits   COMPREHENSIVE METABOLIC PANEL - Abnormal; Notable for the following components:    Sodium 134 (*)     Chloride 91 (*)     Anion Gap 17 (*)     Glucose 207 (*)     BUN 68 (*)     Creatinine 7.2 (*)     Est, Glom Filt Rate 9 (*)     Calcium 8.0 (*)     All other components within normal limits        Background  History:   Past Medical History:   Diagnosis Date    Abscess     scrotal    Acute renal failure (ARF) ([facility]) 2019    Anxiety associated with depression     Anxiety

## 2024-08-27 NOTE — PROGRESS NOTES
Wound Care Center Progress Visit      Yovanny Levine  AGE: 49 y.o.   GENDER: male  : 1975  EPISODE DATE:  2024   Referred by: José Luis Izquierdo MD     Subjective:     CHIEF COMPLAINT  WOUND   Problem List Items Addressed This Visit          Endocrine    Diabetic ulcer of midfoot associated with type 2 diabetes mellitus, with muscle involvement without evidence of necrosis (HCC)       Genitourinary    ESRD (end stage renal disease) on dialysis (HCC)       Other    Acute pain of left knee - Primary    Pain in left lower leg     Chief Complaint   Patient presents with    Wound Check        HISTORY of PRESENT ILLNESS      Yovanny Levine is a 49 y.o. male who presents to the Wound Clinic for evaluation and treatment of Chronic diabetic  and  surgical ulcer(s) of the left foot. The condition is of marked severity. The patient has been seeing Dr. Webber at the wound clinic since 24. He was hospitalized -24  Sepsis secondary to left foot cellulitis and chronic left diabetic foot ulcer. General surgery consulted, patient underwent incision and drainage of left foot  and again 2/10/24, IV antibiotics given, home on Augmentin through 3/20/24.  Hospitalized 3/12-3/20/24 with diabetic foot infection, IV antibiotics, switched to oral Cipro. Hospitalized -24 with I&D and foot debridement per Dr. Forrest 24 with wound vac placement. Osteomyelitis, infectious disease consult: plan for vancomycin with dialysis for 6-week cumulative course with end date of 24. The patient has significant underlying medical conditions as below. José Luis Izquierdo MD is PCP.    Wound Pain Timing/Severity: waxing and waning, moderate  Quality of pain: shooting, pins and needles  Severity of pain:  5 / 10   Modifying Factors: diabetes, chronic pressure, shear force, obesity, and non-adherence  Associated Signs/Symptoms: edema, erythema, drainage, odor, and pain    Recent Wound History:     Patient  without evidence of necrosis (HCC) 09/21/2021    WD-Wound, surgical, infected, initial encounter 12/08/2017    Wrist fracture 1986       PAST SURGICAL HISTORY    Past Surgical History:   Procedure Laterality Date    ABDOMEN SURGERY      CARDIAC CATHETERIZATION  2003, 2013    Normal (Dr. Winslow)    COLONOSCOPY  06/02/2011    Pandiverticulosis, Nonbleeding internal hemorrhoids, Repeat colonoscopy at age 50- Dr. Medina    CORONARY STENT PLACEMENT      x 3    FOOT DEBRIDEMENT Left 8/30/2023    FOOT DEBRIDEMENT INCISION AND DRAINAGE performed by Sammy Hsieh MD at [facility] OR    FOOT DEBRIDEMENT Left 4/23/2024    FOOT DEBRIDEMENT INCISION AND DRAINAGE performed by Claudy Forrest DO at [facility] OR    FOOT SURGERY Left 12/21/2021    EXCISION OF HEAD LEFT 2ND METATARSAL performed by Andrzej Webber DPM at [facility] OR    IR NONTUNNELED VASCULAR CATHETER  07/25/2022    IR NONTUNNELED VASCULAR CATHETER 7/25/2022 [facility] SPECIAL PROCEDURES    IR TUNNELED CATHETER PLACEMENT GREATER THAN 5 YEARS  07/27/2022    IR TUNNELED CATHETER PLACEMENT GREATER THAN 5 YEARS 7/27/2022 [facility] SPECIAL PROCEDURES    IR TUNNELED CATHETER PLACEMENT GREATER THAN 5 YEARS  11/28/2022    IR TUNNELED CATHETER PLACEMENT GREATER THAN 5 YEARS 11/28/2022 [facility] SPECIAL PROCEDURES    NOSE SURGERY  1993    \"had reconstruction surgery on nose a year after the other nose surgery\"    OTHER SURGICAL HISTORY Right 05/22/2015    I & D right great toe with partial amputation    OTHER SURGICAL HISTORY  12/04/2017    inc and drainage of abcess    DE INCISION BONE CORTEX FOOT Left 09/22/2018    LEFT FOOT DEBRIDEMENT INCISION AND DRAINAGE TOP AND BOTTOM performed by Jose Caba DPM at [facility] OR    SCROTAL SURGERY N/A 05/15/2021    SCROTAL AND PERINEAL DEBRIDEMENT performed by Elder Betancur MD at [facility] OR    SCROTAL SURGERY N/A 05/16/2021    SCROTAL RE-DEBRIDEMENT  INCISION AND DRAINAGE performed by Elder Betancur MD at [facility] OR    SKIN BIOPSY N/A 06/18/2013    sebaceous cyst

## 2024-08-28 LAB
ANION GAP SERPL CALCULATED.3IONS-SCNC: 16 MMOL/L (ref 7–16)
BACTERIA: ABNORMAL /HPF
BASOPHILS ABSOLUTE: 0 K/CU MM
BASOPHILS RELATIVE PERCENT: 0.4 % (ref 0–1)
BILIRUBIN, URINE: NEGATIVE MG/DL
BLOOD, URINE: ABNORMAL
BUN SERPL-MCNC: 40 MG/DL (ref 6–23)
CALCIUM SERPL-MCNC: 9.3 MG/DL (ref 8.3–10.6)
CHLORIDE BLD-SCNC: 94 MMOL/L (ref 99–110)
CLARITY, UA: CLEAR
CO2: 26 MMOL/L (ref 21–32)
COLOR, UA: YELLOW
CREAT SERPL-MCNC: 5.2 MG/DL (ref 0.9–1.3)
CRP SERPL HS-MCNC: 138.6 MG/L
DIFFERENTIAL TYPE: ABNORMAL
DOSE AMOUNT: NORMAL
DOSE TIME: NORMAL
EOSINOPHILS ABSOLUTE: 0.1 K/CU MM
EOSINOPHILS RELATIVE PERCENT: 1.6 % (ref 0–3)
ESTIMATED AVERAGE GLUCOSE: 114 MG/DL
GFR, ESTIMATED: 13 ML/MIN/1.73M2
GLUCOSE BLD-MCNC: 125 MG/DL (ref 70–99)
GLUCOSE BLD-MCNC: 142 MG/DL (ref 70–99)
GLUCOSE BLD-MCNC: 162 MG/DL (ref 70–99)
GLUCOSE BLD-MCNC: 164 MG/DL (ref 70–99)
GLUCOSE SERPL-MCNC: 192 MG/DL (ref 70–99)
GLUCOSE URINE: 250 MG/DL
HBA1C MFR BLD: 5.6 % (ref 4.2–6.3)
HCT VFR BLD CALC: 27.7 % (ref 42–52)
HEMOGLOBIN: 8.8 GM/DL (ref 13.5–18)
HYALINE CASTS: 10 /LPF
IMMATURE NEUTROPHIL %: 0.4 % (ref 0–0.43)
KETONES, URINE: NEGATIVE MG/DL
LEUKOCYTE ESTERASE, URINE: NEGATIVE
LYMPHOCYTES ABSOLUTE: 0.2 K/CU MM
LYMPHOCYTES RELATIVE PERCENT: 2.6 % (ref 24–44)
MCH RBC QN AUTO: 29.7 PG (ref 27–31)
MCHC RBC AUTO-ENTMCNC: 31.8 % (ref 32–36)
MCV RBC AUTO: 93.6 FL (ref 78–100)
MONOCYTES ABSOLUTE: 0.5 K/CU MM
MONOCYTES RELATIVE PERCENT: 7.7 % (ref 0–4)
NEUTROPHILS ABSOLUTE: 6 K/CU MM
NEUTROPHILS RELATIVE PERCENT: 87.3 % (ref 36–66)
NITRITE URINE, QUANTITATIVE: NEGATIVE
NUCLEATED RBC %: 0 %
PDW BLD-RTO: 15.9 % (ref 11.7–14.9)
PH, URINE: 7 (ref 5–8)
PLATELET # BLD: 190 K/CU MM (ref 140–440)
PMV BLD AUTO: 10 FL (ref 7.5–11.1)
POTASSIUM SERPL-SCNC: 4.1 MMOL/L (ref 3.5–5.1)
PROTEIN UA: >300 MG/DL
RBC # BLD: 2.96 M/CU MM (ref 4.6–6.2)
RBC URINE: 1 /HPF (ref 0–3)
SED RATE, AUTOMATED: 87 MM/HR (ref 0–15)
SODIUM BLD-SCNC: 136 MMOL/L (ref 135–145)
SPECIFIC GRAVITY UA: 1.01 (ref 1–1.03)
SQUAMOUS EPITHELIAL: 1 /HPF
TOTAL IMMATURE NEUTOROPHIL: 0.03 K/CU MM
TOTAL NUCLEATED RBC: 0 K/CU MM
TRANSITIONAL EPITHELIAL: <1 /HPF
TRICHOMONAS: ABNORMAL /HPF
UROBILINOGEN, URINE: 1 MG/DL (ref 0.2–1)
VANCOMYCIN RANDOM: 19.8 UG/ML
WBC # BLD: 6.9 K/CU MM (ref 4–10.5)
WBC UA: 3 /HPF (ref 0–2)

## 2024-08-28 PROCEDURE — 81001 URINALYSIS AUTO W/SCOPE: CPT

## 2024-08-28 PROCEDURE — 86140 C-REACTIVE PROTEIN: CPT

## 2024-08-28 PROCEDURE — 94761 N-INVAS EAR/PLS OXIMETRY MLT: CPT

## 2024-08-28 PROCEDURE — 90935 HEMODIALYSIS ONE EVALUATION: CPT

## 2024-08-28 PROCEDURE — 2700000000 HC OXYGEN THERAPY PER DAY

## 2024-08-28 PROCEDURE — 85652 RBC SED RATE AUTOMATED: CPT

## 2024-08-28 PROCEDURE — 6370000000 HC RX 637 (ALT 250 FOR IP): Performed by: STUDENT IN AN ORGANIZED HEALTH CARE EDUCATION/TRAINING PROGRAM

## 2024-08-28 PROCEDURE — 6370000000 HC RX 637 (ALT 250 FOR IP): Performed by: INTERNAL MEDICINE

## 2024-08-28 PROCEDURE — 6370000000 HC RX 637 (ALT 250 FOR IP)

## 2024-08-28 PROCEDURE — 2580000003 HC RX 258

## 2024-08-28 PROCEDURE — 99223 1ST HOSP IP/OBS HIGH 75: CPT | Performed by: INTERNAL MEDICINE

## 2024-08-28 PROCEDURE — 82962 GLUCOSE BLOOD TEST: CPT

## 2024-08-28 PROCEDURE — 6360000002 HC RX W HCPCS

## 2024-08-28 PROCEDURE — 6360000002 HC RX W HCPCS: Performed by: STUDENT IN AN ORGANIZED HEALTH CARE EDUCATION/TRAINING PROGRAM

## 2024-08-28 PROCEDURE — 36415 COLL VENOUS BLD VENIPUNCTURE: CPT

## 2024-08-28 PROCEDURE — 2580000003 HC RX 258: Performed by: STUDENT IN AN ORGANIZED HEALTH CARE EDUCATION/TRAINING PROGRAM

## 2024-08-28 PROCEDURE — 1200000000 HC SEMI PRIVATE

## 2024-08-28 PROCEDURE — 97605 NEG PRS WND THER DME<=50SQCM: CPT

## 2024-08-28 PROCEDURE — 80048 BASIC METABOLIC PNL TOTAL CA: CPT

## 2024-08-28 PROCEDURE — 85025 COMPLETE CBC W/AUTO DIFF WBC: CPT

## 2024-08-28 PROCEDURE — 6360000002 HC RX W HCPCS: Performed by: INTERNAL MEDICINE

## 2024-08-28 PROCEDURE — 80202 ASSAY OF VANCOMYCIN: CPT

## 2024-08-28 PROCEDURE — 5A1D70Z PERFORMANCE OF URINARY FILTRATION, INTERMITTENT, LESS THAN 6 HOURS PER DAY: ICD-10-PCS | Performed by: INTERNAL MEDICINE

## 2024-08-28 RX ORDER — HEPARIN SODIUM 1000 [USP'U]/ML
4400 INJECTION, SOLUTION INTRAVENOUS; SUBCUTANEOUS
Status: COMPLETED | OUTPATIENT
Start: 2024-08-28 | End: 2024-08-28

## 2024-08-28 RX ORDER — SEVELAMER CARBONATE 800 MG/1
800 TABLET, FILM COATED ORAL
Status: DISCONTINUED | OUTPATIENT
Start: 2024-08-28 | End: 2024-09-03 | Stop reason: HOSPADM

## 2024-08-28 RX ORDER — OXYCODONE AND ACETAMINOPHEN 7.5; 325 MG/1; MG/1
1 TABLET ORAL EVERY 6 HOURS PRN
Status: DISCONTINUED | OUTPATIENT
Start: 2024-08-28 | End: 2024-09-01

## 2024-08-28 RX ORDER — METRONIDAZOLE 250 MG/1
500 TABLET ORAL EVERY 8 HOURS SCHEDULED
Status: DISCONTINUED | OUTPATIENT
Start: 2024-08-28 | End: 2024-09-03

## 2024-08-28 RX ORDER — CALCITRIOL 0.25 UG/1
1 CAPSULE, LIQUID FILLED ORAL DAILY
Status: DISCONTINUED | OUTPATIENT
Start: 2024-08-28 | End: 2024-09-03 | Stop reason: HOSPADM

## 2024-08-28 RX ADMIN — HEPARIN SODIUM 5000 UNITS: 5000 INJECTION INTRAVENOUS; SUBCUTANEOUS at 07:18

## 2024-08-28 RX ADMIN — SODIUM CHLORIDE 50 ML: 9 INJECTION, SOLUTION INTRAVENOUS at 22:26

## 2024-08-28 RX ADMIN — EPOETIN ALFA-EPBX 10000 UNITS: 10000 INJECTION, SOLUTION INTRAVENOUS; SUBCUTANEOUS at 09:29

## 2024-08-28 RX ADMIN — METRONIDAZOLE 500 MG: 250 TABLET ORAL at 14:28

## 2024-08-28 RX ADMIN — CALCITRIOL CAPSULES 0.25 MCG 1 MCG: 0.25 CAPSULE ORAL at 14:28

## 2024-08-28 RX ADMIN — HYDRALAZINE HYDROCHLORIDE 25 MG: 25 TABLET ORAL at 14:29

## 2024-08-28 RX ADMIN — OXYCODONE AND ACETAMINOPHEN 1 TABLET: 7.5; 325 TABLET ORAL at 22:16

## 2024-08-28 RX ADMIN — ONDANSETRON 4 MG: 2 INJECTION INTRAMUSCULAR; INTRAVENOUS at 04:30

## 2024-08-28 RX ADMIN — OXYCODONE AND ACETAMINOPHEN 1 TABLET: 7.5; 325 TABLET ORAL at 14:29

## 2024-08-28 RX ADMIN — INSULIN GLARGINE 5 UNITS: 100 INJECTION, SOLUTION SUBCUTANEOUS at 22:15

## 2024-08-28 RX ADMIN — OXYCODONE AND ACETAMINOPHEN 1 TABLET: 7.5; 325 TABLET ORAL at 07:18

## 2024-08-28 RX ADMIN — VANCOMYCIN HYDROCHLORIDE 1000 MG: 1 INJECTION, POWDER, LYOPHILIZED, FOR SOLUTION INTRAVENOUS at 18:03

## 2024-08-28 RX ADMIN — HYDRALAZINE HYDROCHLORIDE 25 MG: 25 TABLET ORAL at 22:16

## 2024-08-28 RX ADMIN — CEFEPIME 1000 MG: 1 INJECTION, POWDER, FOR SOLUTION INTRAMUSCULAR; INTRAVENOUS at 22:27

## 2024-08-28 RX ADMIN — METRONIDAZOLE 500 MG: 250 TABLET ORAL at 22:15

## 2024-08-28 RX ADMIN — SEVELAMER CARBONATE 800 MG: 800 TABLET, FILM COATED ORAL at 14:28

## 2024-08-28 RX ADMIN — OXYCODONE HYDROCHLORIDE AND ACETAMINOPHEN 1 TABLET: 5; 325 TABLET ORAL at 00:13

## 2024-08-28 RX ADMIN — SEVELAMER CARBONATE 800 MG: 800 TABLET, FILM COATED ORAL at 18:01

## 2024-08-28 RX ADMIN — SODIUM CHLORIDE, PRESERVATIVE FREE 10 ML: 5 INJECTION INTRAVENOUS at 22:16

## 2024-08-28 RX ADMIN — HEPARIN SODIUM 5000 UNITS: 5000 INJECTION INTRAVENOUS; SUBCUTANEOUS at 22:15

## 2024-08-28 RX ADMIN — HEPARIN SODIUM 5000 UNITS: 5000 INJECTION INTRAVENOUS; SUBCUTANEOUS at 14:28

## 2024-08-28 RX ADMIN — HYDRALAZINE HYDROCHLORIDE 25 MG: 25 TABLET ORAL at 07:22

## 2024-08-28 RX ADMIN — ATORVASTATIN CALCIUM 40 MG: 40 TABLET, FILM COATED ORAL at 22:16

## 2024-08-28 RX ADMIN — HEPARIN SODIUM 4400 UNITS: 1000 INJECTION INTRAVENOUS; SUBCUTANEOUS at 09:29

## 2024-08-28 ASSESSMENT — PAIN DESCRIPTION - ORIENTATION
ORIENTATION: LEFT

## 2024-08-28 ASSESSMENT — PAIN DESCRIPTION - LOCATION
LOCATION: FOOT

## 2024-08-28 ASSESSMENT — PAIN - FUNCTIONAL ASSESSMENT
PAIN_FUNCTIONAL_ASSESSMENT: ACTIVITIES ARE NOT PREVENTED
PAIN_FUNCTIONAL_ASSESSMENT: ACTIVITIES ARE NOT PREVENTED
PAIN_FUNCTIONAL_ASSESSMENT: PREVENTS OR INTERFERES SOME ACTIVE ACTIVITIES AND ADLS
PAIN_FUNCTIONAL_ASSESSMENT: ACTIVITIES ARE NOT PREVENTED
PAIN_FUNCTIONAL_ASSESSMENT: ACTIVITIES ARE NOT PREVENTED
PAIN_FUNCTIONAL_ASSESSMENT: PREVENTS OR INTERFERES SOME ACTIVE ACTIVITIES AND ADLS

## 2024-08-28 ASSESSMENT — PAIN DESCRIPTION - FREQUENCY
FREQUENCY: CONTINUOUS

## 2024-08-28 ASSESSMENT — PAIN SCALES - GENERAL
PAINLEVEL_OUTOF10: 8
PAINLEVEL_OUTOF10: 8
PAINLEVEL_OUTOF10: 6
PAINLEVEL_OUTOF10: 8

## 2024-08-28 ASSESSMENT — PAIN DESCRIPTION - DESCRIPTORS
DESCRIPTORS: BURNING
DESCRIPTORS: SHARP;BURNING
DESCRIPTORS: CRUSHING
DESCRIPTORS: BURNING

## 2024-08-28 ASSESSMENT — PAIN DESCRIPTION - PAIN TYPE
TYPE: ACUTE PAIN

## 2024-08-28 ASSESSMENT — PAIN DESCRIPTION - ONSET
ONSET: ON-GOING

## 2024-08-28 NOTE — PROGRESS NOTES
Removed 3.5 L        Patient Name: Yovanny Levine  Patient : 1975  MRN: [id number]     Acct: [id number]  Date of Admission: 2024  Room/Bed: 3030/3030-A  Code Status:  Full Code  Allergies:   Allergies   Allergen Reactions    Adhesive Tape Rash    Doxycycline Nausea And Vomiting    Reglan [Metoclopramide] Anxiety     Diagnosis:    Patient Active Problem List   Diagnosis    Hiatal hernia    Diabetes mellitus type 2, improved controlled    Diabetic neuropathy (HCC)    Panic attack    Anxiety associated with depression    Neck pain    DANIELA (obstructive sleep apnea)    Urinary frequency    Bilateral carpal tunnel syndrome- left worse than right    Hyperlipidemia with target LDL less than 100    Essential hypertension    Bronchitis    Osteomyelitis (HCC)    Abscess    Periumbilical hernia    Sepsis (HCC)    Cellulitis of left foot    Constipation    Intractable nausea and vomiting    Uncontrolled type 2 diabetes mellitus    Nausea and vomiting    Diabetic foot infection (HCC)    Acute renal failure (ARF) (HCC)    Cellulitis    Cellulitis of left arm    Cellulitis, scrotum    Acute epididymitis    Irene gangrene    Recurrent major depressive disorder, in full remission (HCC)    Generalized anxiety disorder    Morbid obesity with body mass index (BMI) of 40.0 to 44.9 in adult (HCC)    Necrotizing fasciitis (HCC)    Syncope    Right groin wound    Ulcer of right groin with fat layer exposed (HCC)    Diabetic ulcer of left midfoot associated with type 2 diabetes mellitus (HCC)    Nephrotic syndrome    Generalized edema    Stage 3b chronic kidney disease (HCC)    Diabetic nephropathy associated with type 2 diabetes mellitus (HCC)    Hypoalbuminemia    Chronic kidney disease, stage IV (severe) (HCC)    FH: CAD (coronary artery disease)    ASCVD (arteriosclerotic cardiovascular disease)    Other proteinuria    Chest pain    Surgical wound dehiscence    CARMEN (acute kidney injury) (HCC)    Anemia    Chest tightness  Last Name, Title):  DIAMOND Laguerre      Education  Person Educated: Patient   Knowledge Base: Substantial  Barriers to Learning?: None  Preferred method of Learning: Oral  Topic(s): Access Care, Signs and Symptoms of Infection, and Procedural   Teaching Tools: Explanation   Response to Education: Verbalized Understanding     Electronically signed by Marina Jenkins RN on 8/28/2024 at 1:36 PM

## 2024-08-28 NOTE — PROGRESS NOTES
PHARMACY VANCOMYCIN MONITORING SERVICE  Pharmacy consulted by Earnest Shafer  for monitoring and adjustment.    Indication for treatment: Vancomycin indication: Bone/Joint infection   Goal trough: Trough Goal: 10-15 mcg/mL  AUC/PAMELA: <500    Risk Factors for MRSA Identified:   Patient on hemodialysis    Pertinent Laboratory Values:   Temp Readings from Last 3 Encounters:   08/27/24 98.5 °F (36.9 °C) (Oral)   08/27/24 98.5 °F (36.9 °C) (Temporal)   08/13/24 98 °F (36.7 °C) (Temporal)     Recent Labs     08/27/24  1630   WBC 9.1     Recent Labs     08/27/24  1630   BUN 68*   CREATININE 7.2*     Estimated Creatinine Clearance: 16 mL/min (A) (based on SCr of 7.2 mg/dL (H)).  No intake or output data in the 24 hours ending 08/27/24 2044  Last Encounter Weight:  Wt Readings from Last 3 Encounters:   08/27/24 115.7 kg (255 lb)   06/12/24 125.5 kg (276 lb 10.8 oz)   05/22/24 117.9 kg (260 lb)       Pertinent Cultures:   Date    Source    Results  8/27   Blood    pending  8/27   urine                          pending           Vancomycin level:   TROUGH:  No results for input(s): \"VANCOTROUGH\" in the last 72 hours.  RANDOM:  No results for input(s): \"VANCORANDOM\" in the last 72 hours.    Assessment:  HPI: Intractable left foot pain with left foot x-ray showing signs of lateral midfoot ulceration concerning for possible osteomyelitis, Diabetes mellitus, ESRD on hemodialysis, Chronic pain disorder   SCr, BUN, and urine output: 7.2, 68  Day(s) of therapy: 1  Vancomycin concentration: to be collected     Plan:  Loading dose of vancomycin administered:  . Will give vancomycin 2000 mg x 1 and dose intermittently due to renal function.   Pharmacy will continue to monitor patient and adjust therapy as indicated    VANCOMYCIN CONCENTRATION SCHEDULED FOR 8/28 @0600    Thank you for the consult.  Tamra Parkinson RPH  8/27/2024 8:44 PM

## 2024-08-28 NOTE — CONSULTS
Infectious Disease Consult Note  2024   Patient Name: Yovanny Levine : 1975     Assessment  Left foot diabetic ulcer wound infection  2024: Status post left fifth toe metatarsal amputation, excisional debridement of diabetic foot ulcer performed by Dr. TOBIAS Forrest  2023: Amputation of left great toe through mid mid metatarsal with drainage of plantar abscess and debridement of skin and subcutaneous tissue, muscle and fascia, performed by Dr. Hsieh.  Previous wound cultures over the past year have isolated Serratia marcescens, Enterococcus faecium, corynebacterium striatum, Porphyromonas asaccharolyticus/uenonis, Oligella urethralis, Proteus vulgaris, Proteus penneri, Enterococcus faecalis, Finegoldia magna, MSSA  Left foot pain and groin pain, suggestive of cellulitis and lymphadenitis  ESRD on hemodialysis  Right foot Charcot  Type 2 diabetes mellitus with polyneuropathy  Obesity class II  Comorbid conditions:     Plan  Therapeutic:  Continue intravenous vancomycin (-) and cefepime (-)  Start metronidazole (-).  Diagnostic:  Wound culture when wound VAC is changed  F/u:  MRI of the left ankle  Other:     Thank you for allowing me to consult in the care of this patient.  ------------------------  REASON FOR CONSULT: \"Repeat concern for osteomyelitis of the left foot\"  Requested by: Kike Tinoco MD  HPI:Patient is a 49 y.o. male living with coronary artery disease, hypertension, type 2 diabetes mellitus, hyperlipidemia, chronic pain who is known to the infectious disease service for multiple admissions where he was diagnosed with left diabetic foot osteomyelitis.  He has undergone fifth metatarsal amputation on 2024.  Was treated with a 6-week course of intravenous vancomycin (culture was positive for Enterococcus faecium and corynebacterium striatum.  He was seen again in 2024 and his wound VAC was in place but he had exposed bone.  Wound cultures positive for Serratia  dogs  ?  ALLERGIES  Allergies   Allergen Reactions    Adhesive Tape Rash    Doxycycline Nausea And Vomiting    Reglan [Metoclopramide] Anxiety      MEDICATIONS  Reviewed and are per the chart/EMR.   IMMUNIZATION HISTORY  Immunization History   Administered Date(s) Administered    Influenza 10/15/2013    Influenza, Quadv, 6 mo and older, IM, PF (Flulaval, Fluarix) 09/19/2018    Pneumococcal, PPSV23, PNEUMOVAX 23, (age 2y+), SC/IM, 0.5mL 05/02/2013    TDaP, ADACEL (age 10y-64y), BOOSTRIX (age 10y+), IM, 0.5mL 05/24/2022     ?  ?  -------------------------------------------------------------------------------------------------------------------    Vital Signs:  Vitals:    08/28/24 0245   BP:    Pulse:    Resp: 20   Temp: 98.4 °F (36.9 °C)   SpO2:          Exam:    VS: noted; wt 115.7 kg  Gen: alert and oriented X3, no distress  Skin: no stigmata of endocarditis  Wounds: left foot wound VAC  HEMT: AT/NC Oropharynx pink, moist, and without lesions or exudates; dentition in poor state of repair  Eyes: PERRLA, EOMI, conjunctiva pink, sclera anicteric.   Neck: Supple. Trachea midline. No LAD.  Chest: no distress and CTA. Good air movement.  Heart: RRR and no MRG.   Abd: soft, non-distended, no tenderness, no hepatomegaly. Normoactive bowel sounds.  Ext: no clubbing, cyanosis, or edema  Catheter Site: without erythema or tenderness  LDA:   Neuro: Mental status intact. CN 2-12 intact and no focal sensory or motor deficits    ?Diagnostic Studies: reviewed  ??  I have examined this patient and available medical records on this date and have made the above observations, conclusions and recommendations.  Electronically signed by: Electronically signed by Heber Fleming MD on 8/28/2024 at 7:15 AM

## 2024-08-28 NOTE — PROGRESS NOTES
4 Eyes Skin Assessment     NAME:  Yovanny Levine  YOB: 1975  MEDICAL RECORD NUMBER:  [id number]    The patient is being assessed for  Admission    I agree that at least one RN has performed a thorough Head to Toe Skin Assessment on the patient. ALL assessment sites listed below have been assessed.      Areas assessed by both nurses:    Head, Face, Ears, Shoulders, Back, Chest, Arms, Elbows, Hands, Sacrum. Buttock, Coccyx, Ischium, Legs. Feet and Heels, and Under Medical Devices         Does the Patient have a Wound? Yes wound(s) were present on assessment. LDA wound assessment was Initiated and completed by RN Open wound to Left foot        Pradeep Prevention initiated by RN: No  Wound Care Orders initiated by RN: Yes    Pressure Injury (Stage 3,4, Unstageable, DTI, NWPT, and Complex wounds) if present, place Wound referral order by RN under : No    New Ostomies, if present place, Ostomy referral order under : No     Nurse 1 eSignature: Electronically signed by Jules Lyons RN on 8/27/24 at 11:57 PM EDT    **SHARE this note so that the co-signing nurse can place an eSignature**    Nurse 2 eSignature: Electronically signed by Jamee Martin RN on 8/28/24 at 2:43 AM EDT

## 2024-08-28 NOTE — PROGRESS NOTES
V2.0  [facility] Hospitalist Progress Note      Name:  Yovanny Levine /Age/Sex: 1975  (49 y.o. male)   MRN & CSN:  [id number] & [id number] Encounter Date/Time: 2024 2:48 PM EDT    [facility] PCP: José Luis Izquierdo MD       Hospital Day: 2      Subjective:     Chief Complaint:  Leg Pain (States leg pain since yesterday)     Patient seen and examined while in dialysis.   Doing well. States his pain is under control.  ID added Flagyl. Waiting on MRI     Assessment and Plan:   Left foot cellulitis, concerning osteomyelitis. Came w worsening l foot pain. Left foot x-ray showing signs of lateral midfoot ulceration concerning for possible osteomyelitis.    - ID consulted  - now on vanc+cefepime+flagyl  - f/u blood cxX2  - pain control  - waiting on MRI L foot      ESRD on hemodialysis   nephrology has been consulted    History of chronic left-sided foot osteomyelitis   Has wound VAC in place since last admission.     - plan as above  - plan to consult general surgery if OM is confirmed  - wound care consulted    Coronary artery disease with history of PCI and stenting to LAD and LCx continue Plavix statin and Coreg    Diabetes mellitus insulin-dependent start the patient on Lantus of 10 units and sliding scale insulin monitor for hypoglycemia    Hyperlipidemia on statin    Chronic pain disorder on home Percocet    Essential hypertension on amlodipine carvedilol and hydralazine along with Imdur        Personally reviewed Lab Studies and Imaging     Imaging that was interpreted personally includes L foot x-ray and results as above    Drugs that require monitoring for toxicity include vanc and the method of monitoring was renal      Diet ADULT DIET; Regular; 5 carb choices (75 gm/meal); No Added Salt (3-4 gm); 1500 ml   DVT Prophylaxis [x] Lovenox, []  Heparin, [] SCDs, [] Ambulation,  [] Eliquis, [] Xarelto  [] Coumadin   Code Status Full Code   Disposition From: home  Expected Disposition:

## 2024-08-28 NOTE — CARE COORDINATION
08/28/24 1511   Service Assessment   Patient Orientation Alert and Oriented   Cognition Alert   History Provided By Patient   Primary Caregiver Self   Support Systems Children   Patient's Healthcare Decision Maker is: Legal Next of Kin   PCP Verified by CM Yes   Prior Functional Level Assistance with the following:;Mobility   Current Functional Level Assistance with the following:;Mobility   Can patient return to prior living arrangement Unknown at present   Ability to make needs known: Good   Family able to assist with home care needs: No   Would you like for me to discuss the discharge plan with any other family members/significant others, and if so, who? No   Financial Resources Medicare   Community Resources ECF/Home Care     CM in to see Pt to initiate discharge planning.  Pt is from home with [facility].  Pt denies any needs at this time.  CM following

## 2024-08-28 NOTE — CONSULTS
PHARMACY VANCOMYCIN MONITORING SERVICE  Pharmacy consulted by Earnest Shafer  for monitoring and adjustment.    Indication for treatment: Vancomycin indication: Bone/Joint infection   Goal trough: Trough Goal: 10-15 mcg/mL  AUC/PAMELA: <500    Risk Factors for MRSA Identified:   Hospitalization within the past 90 days, Received IV antibiotics within the past 90 days, Patient on hemodialysis    Pertinent Laboratory Values:   Temp Readings from Last 3 Encounters:   08/28/24 98.1 °F (36.7 °C) (Oral)   08/27/24 98.5 °F (36.9 °C) (Temporal)   08/13/24 98 °F (36.7 °C) (Temporal)     Recent Labs     08/27/24  1630   WBC 9.1     Recent Labs     08/27/24  1630   BUN 68*   CREATININE 7.2*     Estimated Creatinine Clearance: 16 mL/min (A) (based on SCr of 7.2 mg/dL (H)).  No intake or output data in the 24 hours ending 08/28/24 0844  Last Encounter Weight:  Wt Readings from Last 3 Encounters:   08/27/24 115.7 kg (255 lb)   06/12/24 125.5 kg (276 lb 10.8 oz)   05/22/24 117.9 kg (260 lb)       Pertinent Cultures:   Date    Source    Results  8/27   Blood    Sent  8/27   urine                          Sent           Vancomycin level:   TROUGH:  No results for input(s): \"VANCOTROUGH\" in the last 72 hours.  RANDOM:    Recent Labs     08/28/24  0541   VANCORANDOM 19.8       Assessment:  HPI: Intractable left foot pain with left foot x-ray showing signs of lateral midfoot ulceration concerning for possible osteomyelitis, Diabetes mellitus, ESRD on hemodialysis, Chronic pain disorder   SCr, BUN, and urine output: Scr=7.2, BUN=68, UoP=none documented  Day(s) of therapy; 2  Vancomycin concentration:  08/28: 19.8 mg/L (pre-HD level after loading dose)    Plan:  iHD scheduled every MWF  Will give vancomycin 1000 mg IV x ONCE after dialysis session today  Pharmacy will continue to monitor patient and adjust therapy as indicated    VANCOMYCIN CONCENTRATION SCHEDULED FOR 8/30 @0600    Thank you for the consult.  Kinsey Crump RPH,

## 2024-08-28 NOTE — CONSULTS
[facility] Ostomy Continence Nurse  Consult Note       Yovanny Levine  AGE: 49 y.o.   GENDER: male  : 1975  TODAY'S DATE:  2024    Subjective:     Reason for Evaluation and Assessment: wound assessment      Yovanny Levine is a 49 y.o. male referred by:   [x] Physician  [] Nursing  [] Other:     Wound Identification:  Wound Type: non-healing surgical  Contributing Factors: diabetes, poor glucose control, chronic pressure, and shear force        PAST MEDICAL HISTORY        Diagnosis Date    Abscess 2010    scrotal    Acute renal failure (ARF) ([facility]) 2019    Anxiety associated with depression     Anxiety associated with depression     Back pain 2012    CAD (coronary artery disease) 02/10/2023    Chest pain 2013    Diabetic nephropathy ([facility])     Diabetic neuropathy ([facility]) 2011    Diabetic ulcer of left midfoot associated with type 2 diabetes mellitus, with fat layer exposed ([facility]) 2017    Diverticulosis     C scope + Dr. Medina    DM (diabetes mellitus), type 2 ([facility])     DR. 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 Medina    Nausea and vomiting 2019    Necrotizing fasciitis ([facility])     Nose fracture 1988    Panic attacks     Pericarditis     Hospitalized with s/p heart cath normal    Peripheral autonomic neuropathy due to diabetes mellitus ([facility])     Axonal EMG- NCS, 2011    Sebaceous cyst 2011    URI (upper respiratory infection) 2012    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 type 2 diabetes mellitus, with muscle involvement without evidence of necrosis ([facility]) 2021    WD-Wound, surgical, infected, initial encounter 2017    Wrist fracture 1986       PAST SURGICAL HISTORY    Past Surgical History:    Procedure Laterality Date    ABDOMEN SURGERY      CARDIAC CATHETERIZATION  2003, 2013    Normal (Dr. Winslow)    COLONOSCOPY  06/02/2011    Pandiverticulosis, Nonbleeding internal hemorrhoids, Repeat colonoscopy at age 50- Dr. Medina    CORONARY STENT PLACEMENT      x 3    FOOT DEBRIDEMENT Left 8/30/2023    FOOT DEBRIDEMENT INCISION AND DRAINAGE performed by Sammy Hsieh MD at [facility] OR    FOOT DEBRIDEMENT Left 4/23/2024    FOOT DEBRIDEMENT INCISION AND DRAINAGE performed by Claudy Forrest DO at [facility] OR    FOOT SURGERY Left 12/21/2021    EXCISION OF HEAD LEFT 2ND METATARSAL performed by Andrzej Webber DPM at [facility] OR    IR NONTUNNELED VASCULAR CATHETER  07/25/2022    IR NONTUNNELED VASCULAR CATHETER 7/25/2022 [facility] SPECIAL PROCEDURES    IR TUNNELED CATHETER PLACEMENT GREATER THAN 5 YEARS  07/27/2022    IR TUNNELED CATHETER PLACEMENT GREATER THAN 5 YEARS 7/27/2022 [facility] SPECIAL PROCEDURES    IR TUNNELED CATHETER PLACEMENT GREATER THAN 5 YEARS  11/28/2022    IR TUNNELED CATHETER PLACEMENT GREATER THAN 5 YEARS 11/28/2022 [facility] SPECIAL PROCEDURES    NOSE SURGERY  1993    \"had reconstruction surgery on nose a year after the other nose surgery\"    OTHER SURGICAL HISTORY Right 05/22/2015    I & D right great toe with partial amputation    OTHER SURGICAL HISTORY  12/04/2017    inc and drainage of abcess    CA INCISION BONE CORTEX FOOT Left 09/22/2018    LEFT FOOT DEBRIDEMENT INCISION AND DRAINAGE TOP AND BOTTOM performed by Jose Caba DPM at [facility] OR    SCROTAL SURGERY N/A 05/15/2021    SCROTAL AND PERINEAL DEBRIDEMENT performed by Elder Betancur MD at [facility] OR    SCROTAL SURGERY N/A 05/16/2021    SCROTAL RE-DEBRIDEMENT  INCISION AND DRAINAGE performed by Elder Betancur MD at [facility] OR    SKIN BIOPSY N/A 06/18/2013    sebaceous cyst removal times 4     TOE AMPUTATION      right great toe    TOE AMPUTATION Right 03/23/2019    TOE AMPUTATION RIGHT 5TH TOE performed by Jose Caba DPM at [facility] OR    TOE AMPUTATION

## 2024-08-28 NOTE — ED NOTES
Medication History  [facility] [place], [place]    Patient Name: Yovanny Levine 1975     Medication history has been completed by: Emeli White    Source(s) of information: Patient, Insurance claims      Primary Care Physician: José Luis Izquierdo MD     Pharmacy: Luis    Allergies as of 08/27/2024 - Fully Reviewed 08/27/2024   Allergen Reaction Noted    Adhesive tape Rash 10/15/2013    Doxycycline Nausea And Vomiting 07/09/2018    Reglan [metoclopramide] Anxiety 10/07/2018        Prior to Admission medications    Medication Sig Start Date End Date Taking? Authorizing Provider   insulin lispro (HUMALOG,ADMELOG) 100 UNIT/ML SOLN injection vial Inject into the skin 3 times daily (with meals) Sliding scale insulin   Yes Provider, MD Deejay   oxyCODONE-acetaminophen (PERCOCET) 7.5-325 MG per tablet Take 1 tablet by mouth every 8 hours as needed for Pain for up to 14 days. Intended supply: 14 days Max Daily Amount: 3 tablets 8/20/24 9/3/24  Shelly Rubalcava APRN - CNP   isosorbide mononitrate (IMDUR) 30 MG extended release tablet Take 1 tablet by mouth daily  Patient not taking: Reported on 8/27/2024 6/18/24   Dereje Pastor MD   amiodarone (CORDARONE) 200 MG tablet Take 1 tablet by mouth daily  Patient not taking: Reported on 8/27/2024 6/18/24   Dereje Pastor MD   clopidogrel (PLAVIX) 75 MG tablet Take 1 tablet by mouth daily  Patient not taking: Reported on 8/27/2024 6/18/24   Dereje Pastor MD   Vitamin D (CHOLECALCIFEROL) 25 MCG (1000 UT) TABS tablet Take 5 tablets by mouth daily 4/27/24 5/27/24  Eduard Medina MD   Wound Cleansers (VASHE WOUND THERAPY) external solution During wound care 3/20/24   Dean Mcclendon MD   calcitRIOL (ROCALTROL) 0.5 MCG capsule Take 1 capsule by mouth 2 times daily  Patient not taking: Reported on 8/27/2024 3/20/24   Dean Mcclendon MD   albuterol sulfate HFA (VENTOLIN HFA) 108 (90 Base) MCG/ACT inhaler  Inhale 2 puffs into the lungs 4 times daily as needed for Wheezing 3/20/24   Dean Mcclendon MD   insulin glargine (LANTUS SOLOSTAR) 100 UNIT/ML injection pen Inject 10 Units into the skin nightly 9/5/23   Ashlie Brown MD   glucose 4 g chewable tablet Take 4 tablets by mouth as needed for Low blood sugar  Patient not taking: Reported on 8/27/2024 8/6/23   Broderick Schafer MD   hydrALAZINE (APRESOLINE) 25 MG tablet Take 1 tablet by mouth every 8 hours 8/6/23   Broderick Schafer MD   amLODIPine (NORVASC) 5 MG tablet Take 1 tablet by mouth daily  Patient not taking: Reported on 8/27/2024 8/6/23   Broderick Schafer MD   atorvastatin (LIPITOR) 40 MG tablet Take 1 tablet by mouth nightly  Patient not taking: Reported on 8/27/2024 2/24/23   Emmanuel Mcginnis MD   carvedilol (COREG) 25 MG tablet Take 1 tablet by mouth 2 times daily (with meals)  Patient not taking: Reported on 8/27/2024    Deejay Devine MD   calcium carbonate (TUMS) 500 MG chewable tablet Take 2 tablets by mouth 3 times daily (with meals)  Patient not taking: Reported on 8/27/2024    Deejay Devine MD   metoprolol tartrate (LOPRESSOR) 25 MG tablet Take 0.5 tablets by mouth 2 times daily 8/25/22 9/14/22  Bert Tinoco MD   furosemide (LASIX) 40 MG tablet Take 1 tablet by mouth daily 9/22/21 8/3/22  Stephen Parmar MD   insulin aspart (NOVOLOG) 100 UNIT/ML injection vial Inject 35 Units into the skin 3 times daily (before meals)  8/3/22  Deejay Devine MD   chlorthalidone (HYGROTON) 25 MG tablet Take 1 tablet by mouth daily 6/15/21 8/3/22  José Luis Izquierdo MD   linagliptin (TRADJENTA) 5 MG tablet Take 1 tablet by mouth daily 3/29/21 8/3/22  José Luis Izquierdo MD   Lancets MISC 1 each by Does not apply route daily 9/21/18   Esther Vela MD   glucose monitoring kit (FREESTYLE) monitoring kit 1 each by Does not apply route once for 1 dose. 6/20/13 6/20/13  Kenia Rodgers MD       Medications added or changed (ex. new

## 2024-08-28 NOTE — CONSULTS
mellitus due to underlying condition, with fat layer exposed ([facility]) 01/12/2024    Open wound of foot, left, sequela 01/12/2024    Chronic foot pain, left 01/12/2024    Open wound of plantar aspect of left foot 10/17/2023    Acute osteomyelitis of metatarsal bone of left foot ([facility]) 09/05/2023    Gangrene ([facility]) 08/01/2023    Surgical wound dehiscence 02/08/2022    Chest pain 12/21/2021    Other proteinuria     FH: CAD (coronary artery disease) 09/29/2021    ASCVD (arteriosclerotic cardiovascular disease) 09/29/2021    Chronic kidney disease, stage IV (severe) ([facility]) 09/28/2021    Nephrotic syndrome 09/22/2021    Generalized edema 09/22/2021    Stage 3b chronic kidney disease ([facility]) 09/22/2021    Diabetic nephropathy associated with type 2 diabetes mellitus ([facility]) 09/22/2021    Hypoalbuminemia 09/22/2021    Diabetic ulcer of left midfoot associated with type 2 diabetes mellitus ([facility]) 09/21/2021    Right groin wound 06/09/2021    Ulcer of right groin with fat layer exposed ([facility])     Syncope 06/05/2021    Necrotizing fasciitis ([facility]) 05/24/2021    Morbid obesity with body mass index (BMI) of 40.0 to 44.9 in adult ([facility]) 05/21/2021    Irene gangrene     Cellulitis, scrotum 05/13/2021    Acute epididymitis     Cellulitis of left arm 04/03/2021    Cellulitis 03/23/2021    Acute renal failure (ARF) ([facility]) 08/04/2019    Diabetic foot infection ([facility]) 03/21/2019    Intractable nausea and vomiting 01/11/2019    Uncontrolled type 2 diabetes mellitus 01/11/2019    Nausea and vomiting 01/11/2019    Constipation 10/07/2018    Cellulitis of left foot     Sepsis ([facility]) 09/15/2018    Periumbilical hernia     Abscess 12/03/2017    Osteomyelitis ([facility]) 05/22/2015    Bronchitis 10/15/2013    Hyperlipidemia with target LDL less than 100 07/15/2013    Essential hypertension 07/15/2013    Bilateral carpal tunnel syndrome- left worse than right 06/24/2013    DANIELA (obstructive sleep apnea) 06/20/2013    Urinary frequency 06/20/2013    Neck pain  tablet Take 1 tablet by mouth daily  Patient not taking: Reported on 8/27/2024 6/18/24   Dereje Pastor MD   clopidogrel (PLAVIX) 75 MG tablet Take 1 tablet by mouth daily  Patient not taking: Reported on 8/27/2024 6/18/24   Dereje Pastor MD   Wound Cleansers (VASHE WOUND THERAPY) external solution During wound care 3/20/24   Dean Mcclendon MD   albuterol sulfate HFA (VENTOLIN HFA) 108 (90 Base) MCG/ACT inhaler Inhale 2 puffs into the lungs 4 times daily as needed for Wheezing 3/20/24   Dean Mcclendon MD   insulin glargine (LANTUS SOLOSTAR) 100 UNIT/ML injection pen Inject 10 Units into the skin nightly 9/5/23   Ashlie Brown MD   hydrALAZINE (APRESOLINE) 25 MG tablet Take 1 tablet by mouth every 8 hours  Patient taking differently: Take 1 tablet by mouth in the morning and at bedtime 8/6/23   Broderick Schafer MD   amLODIPine (NORVASC) 5 MG tablet Take 1 tablet by mouth daily  Patient not taking: Reported on 8/27/2024 8/6/23   Broderick Schafer MD   atorvastatin (LIPITOR) 40 MG tablet Take 1 tablet by mouth nightly  Patient not taking: Reported on 8/27/2024 2/24/23   Emmanuel Mcginnis MD   metoprolol tartrate (LOPRESSOR) 25 MG tablet Take 0.5 tablets by mouth 2 times daily 8/25/22 9/14/22  Bert Tinoco MD   furosemide (LASIX) 40 MG tablet Take 1 tablet by mouth daily 9/22/21 8/3/22  Stephen Parmar MD   insulin aspart (NOVOLOG) 100 UNIT/ML injection vial Inject 35 Units into the skin 3 times daily (before meals)  8/3/22  Deejay Devine MD   chlorthalidone (HYGROTON) 25 MG tablet Take 1 tablet by mouth daily 6/15/21 8/3/22  José Luis Izquierdo MD   linagliptin (TRADJENTA) 5 MG tablet Take 1 tablet by mouth daily 3/29/21 8/3/22  José Luis Izquierdo MD   Lancets MISC 1 each by Does not apply route daily 9/21/18   Esther Vela MD   glucose monitoring kit (FREESTYLE) monitoring kit 1 each by Does not apply route once for 1 dose. 6/20/13 6/20/13  Kenia Rodgers  MD Víctor        Allergies:  Adhesive tape, Doxycycline, and Reglan [metoclopramide]    Social History:   TOBACCO:   reports that he quit smoking about 2 years ago. His smoking use included cigarettes. He started smoking about 22 years ago. He has never been exposed to tobacco smoke. He has never used smokeless tobacco.  ETOH:   reports that he does not currently use alcohol.  OCCUPATION:      Family History:       Problem Relation Age of Onset    Cancer Mother         ?site    Stroke Mother     Bleeding Prob Mother     Diabetes Father     Heart Disease Father     High Blood Pressure Father     Obesity Father     Kidney Disease Father     Diabetes Sister     High Blood Pressure Sister     Mental Illness Sister     Obesity Sister     High Blood Pressure Other     Mental Illness Other         bipolar    Other Son         cyst in ear canal    ADHD Daughter      Physical Exam:    Vitals: BP (!) 162/91   Pulse 88   Temp 98.1 °F (36.7 °C) (Oral)   Resp 16   Ht 1.753 m (5' 9\")   Wt 115.7 kg (255 lb)   SpO2 98%   BMI 37.66 kg/m²   General appearance: in no acute distress, appears stated age  Skin: Skin color, texture, turgor normal. No rashes or lesions  HEENT: normocephalic, atraumatic  Neck: supple, trachea midline  Lungs:  breathing comfortably on NC  Heart:: regular rate and rhythm, S1, S2 normal,  Abdomen: soft, non-tender; bowel sounds normal; no masses,   Extremities: NO ble EDEMA, LUE AVF, left foot is wrapped  Neurologic: Mental status: alert, oriented, interactive, following commands  Psychiatric: mood and affect appropriate     CBC:   Recent Labs     08/27/24  1630   WBC 9.1   HGB 8.8*        BMP:    Recent Labs     08/27/24  1630   *   K 4.4   CL 91*   CO2 26   BUN 68*   CREATININE 7.2*   GLUCOSE 207*     Hepatic:   Recent Labs     08/27/24  1630   AST 22   ALT 19   BILITOT 0.6   ALKPHOS 71            Electronically signed by Melody James DO on 8/28/2024 at 7:30 AM    ADULT

## 2024-08-29 ENCOUNTER — APPOINTMENT (OUTPATIENT)
Dept: MRI IMAGING | Age: 49
DRG: 982 | End: 2024-08-29
Payer: MEDICARE

## 2024-08-29 LAB
ANION GAP SERPL CALCULATED.3IONS-SCNC: 13 MMOL/L (ref 7–16)
BASOPHILS ABSOLUTE: 0 K/CU MM
BASOPHILS RELATIVE PERCENT: 0.5 % (ref 0–1)
BUN SERPL-MCNC: 54 MG/DL (ref 6–23)
CALCIUM SERPL-MCNC: 8.5 MG/DL (ref 8.3–10.6)
CHLORIDE BLD-SCNC: 96 MMOL/L (ref 99–110)
CO2: 27 MMOL/L (ref 21–32)
CREAT SERPL-MCNC: 6.2 MG/DL (ref 0.9–1.3)
CULTURE: NORMAL
DIFFERENTIAL TYPE: ABNORMAL
EOSINOPHILS ABSOLUTE: 0.2 K/CU MM
EOSINOPHILS RELATIVE PERCENT: 3.4 % (ref 0–3)
GFR, ESTIMATED: 10 ML/MIN/1.73M2
GLUCOSE BLD-MCNC: 161 MG/DL (ref 70–99)
GLUCOSE BLD-MCNC: 161 MG/DL (ref 70–99)
GLUCOSE BLD-MCNC: 201 MG/DL (ref 70–99)
GLUCOSE SERPL-MCNC: 139 MG/DL (ref 70–99)
HCT VFR BLD CALC: 26.1 % (ref 42–52)
HEMOGLOBIN: 8.1 GM/DL (ref 13.5–18)
IMMATURE NEUTROPHIL %: 0.5 % (ref 0–0.43)
LYMPHOCYTES ABSOLUTE: 0.4 K/CU MM
LYMPHOCYTES RELATIVE PERCENT: 7.1 % (ref 24–44)
Lab: NORMAL
MCH RBC QN AUTO: 29.7 PG (ref 27–31)
MCHC RBC AUTO-ENTMCNC: 31 % (ref 32–36)
MCV RBC AUTO: 95.6 FL (ref 78–100)
MONOCYTES ABSOLUTE: 0.7 K/CU MM
MONOCYTES RELATIVE PERCENT: 12.8 % (ref 0–4)
NEUTROPHILS ABSOLUTE: 4.3 K/CU MM
NEUTROPHILS RELATIVE PERCENT: 75.7 % (ref 36–66)
NUCLEATED RBC %: 0 %
PDW BLD-RTO: 15.7 % (ref 11.7–14.9)
PLATELET # BLD: 188 K/CU MM (ref 140–440)
PMV BLD AUTO: 10.1 FL (ref 7.5–11.1)
POTASSIUM SERPL-SCNC: 4.5 MMOL/L (ref 3.5–5.1)
RBC # BLD: 2.73 M/CU MM (ref 4.6–6.2)
SODIUM BLD-SCNC: 136 MMOL/L (ref 135–145)
SPECIMEN: NORMAL
TOTAL IMMATURE NEUTOROPHIL: 0.03 K/CU MM
TOTAL NUCLEATED RBC: 0 K/CU MM
WBC # BLD: 5.6 K/CU MM (ref 4–10.5)

## 2024-08-29 PROCEDURE — 6370000000 HC RX 637 (ALT 250 FOR IP): Performed by: STUDENT IN AN ORGANIZED HEALTH CARE EDUCATION/TRAINING PROGRAM

## 2024-08-29 PROCEDURE — 1200000000 HC SEMI PRIVATE

## 2024-08-29 PROCEDURE — 80048 BASIC METABOLIC PNL TOTAL CA: CPT

## 2024-08-29 PROCEDURE — 99222 1ST HOSP IP/OBS MODERATE 55: CPT | Performed by: SURGERY

## 2024-08-29 PROCEDURE — 6370000000 HC RX 637 (ALT 250 FOR IP)

## 2024-08-29 PROCEDURE — 94761 N-INVAS EAR/PLS OXIMETRY MLT: CPT

## 2024-08-29 PROCEDURE — 85025 COMPLETE CBC W/AUTO DIFF WBC: CPT

## 2024-08-29 PROCEDURE — 6360000002 HC RX W HCPCS

## 2024-08-29 PROCEDURE — 6370000000 HC RX 637 (ALT 250 FOR IP): Performed by: INTERNAL MEDICINE

## 2024-08-29 PROCEDURE — 73721 MRI JNT OF LWR EXTRE W/O DYE: CPT

## 2024-08-29 PROCEDURE — 6360000002 HC RX W HCPCS: Performed by: STUDENT IN AN ORGANIZED HEALTH CARE EDUCATION/TRAINING PROGRAM

## 2024-08-29 PROCEDURE — 82962 GLUCOSE BLOOD TEST: CPT

## 2024-08-29 PROCEDURE — 2700000000 HC OXYGEN THERAPY PER DAY

## 2024-08-29 PROCEDURE — 36415 COLL VENOUS BLD VENIPUNCTURE: CPT

## 2024-08-29 PROCEDURE — 2580000003 HC RX 258

## 2024-08-29 PROCEDURE — 2500000003 HC RX 250 WO HCPCS: Performed by: STUDENT IN AN ORGANIZED HEALTH CARE EDUCATION/TRAINING PROGRAM

## 2024-08-29 PROCEDURE — 2580000003 HC RX 258: Performed by: STUDENT IN AN ORGANIZED HEALTH CARE EDUCATION/TRAINING PROGRAM

## 2024-08-29 RX ORDER — HYDROMORPHONE HYDROCHLORIDE 1 MG/ML
0.5 INJECTION, SOLUTION INTRAMUSCULAR; INTRAVENOUS; SUBCUTANEOUS EVERY 4 HOURS PRN
Status: DISCONTINUED | OUTPATIENT
Start: 2024-08-29 | End: 2024-09-01

## 2024-08-29 RX ADMIN — HYDROMORPHONE HYDROCHLORIDE 0.5 MG: 1 INJECTION, SOLUTION INTRAMUSCULAR; INTRAVENOUS; SUBCUTANEOUS at 17:00

## 2024-08-29 RX ADMIN — SODIUM CHLORIDE 50 ML: 9 INJECTION, SOLUTION INTRAVENOUS at 21:58

## 2024-08-29 RX ADMIN — SODIUM CHLORIDE, PRESERVATIVE FREE 10 ML: 5 INJECTION INTRAVENOUS at 08:32

## 2024-08-29 RX ADMIN — HYDRALAZINE HYDROCHLORIDE 25 MG: 25 TABLET ORAL at 21:56

## 2024-08-29 RX ADMIN — HYDRALAZINE HYDROCHLORIDE 25 MG: 25 TABLET ORAL at 15:14

## 2024-08-29 RX ADMIN — OXYCODONE AND ACETAMINOPHEN 1 TABLET: 7.5; 325 TABLET ORAL at 18:52

## 2024-08-29 RX ADMIN — ATORVASTATIN CALCIUM 40 MG: 40 TABLET, FILM COATED ORAL at 21:56

## 2024-08-29 RX ADMIN — HEPARIN SODIUM 5000 UNITS: 5000 INJECTION INTRAVENOUS; SUBCUTANEOUS at 05:36

## 2024-08-29 RX ADMIN — ONDANSETRON 4 MG: 4 TABLET, ORALLY DISINTEGRATING ORAL at 05:37

## 2024-08-29 RX ADMIN — SEVELAMER CARBONATE 800 MG: 800 TABLET, FILM COATED ORAL at 12:01

## 2024-08-29 RX ADMIN — HEPARIN SODIUM 5000 UNITS: 5000 INJECTION INTRAVENOUS; SUBCUTANEOUS at 15:15

## 2024-08-29 RX ADMIN — METRONIDAZOLE 500 MG: 250 TABLET ORAL at 05:35

## 2024-08-29 RX ADMIN — SEVELAMER CARBONATE 800 MG: 800 TABLET, FILM COATED ORAL at 17:00

## 2024-08-29 RX ADMIN — INSULIN GLARGINE 5 UNITS: 100 INJECTION, SOLUTION SUBCUTANEOUS at 21:55

## 2024-08-29 RX ADMIN — CEFEPIME 1000 MG: 1 INJECTION, POWDER, FOR SOLUTION INTRAMUSCULAR; INTRAVENOUS at 22:02

## 2024-08-29 RX ADMIN — SODIUM CHLORIDE, PRESERVATIVE FREE 10 ML: 5 INJECTION INTRAVENOUS at 21:56

## 2024-08-29 RX ADMIN — OXYCODONE AND ACETAMINOPHEN 1 TABLET: 7.5; 325 TABLET ORAL at 12:01

## 2024-08-29 RX ADMIN — POLYETHYLENE GLYCOL (3350) 17 G: 17 POWDER, FOR SOLUTION ORAL at 18:56

## 2024-08-29 RX ADMIN — HYDROMORPHONE HYDROCHLORIDE 0.5 MG: 1 INJECTION, SOLUTION INTRAMUSCULAR; INTRAVENOUS; SUBCUTANEOUS at 21:54

## 2024-08-29 RX ADMIN — CALCITRIOL CAPSULES 0.25 MCG 1 MCG: 0.25 CAPSULE ORAL at 08:31

## 2024-08-29 RX ADMIN — METRONIDAZOLE 500 MG: 250 TABLET ORAL at 15:15

## 2024-08-29 RX ADMIN — SEVELAMER CARBONATE 800 MG: 800 TABLET, FILM COATED ORAL at 08:31

## 2024-08-29 RX ADMIN — HYDRALAZINE HYDROCHLORIDE 25 MG: 25 TABLET ORAL at 05:35

## 2024-08-29 RX ADMIN — OXYCODONE AND ACETAMINOPHEN 1 TABLET: 7.5; 325 TABLET ORAL at 05:36

## 2024-08-29 RX ADMIN — HEPARIN SODIUM 5000 UNITS: 5000 INJECTION INTRAVENOUS; SUBCUTANEOUS at 21:56

## 2024-08-29 RX ADMIN — METRONIDAZOLE 500 MG: 250 TABLET ORAL at 21:56

## 2024-08-29 ASSESSMENT — PAIN DESCRIPTION - LOCATION
LOCATION: FOOT;LEG
LOCATION: FOOT
LOCATION: FOOT
LOCATION: LEG;FOOT
LOCATION: FOOT

## 2024-08-29 ASSESSMENT — PAIN DESCRIPTION - ORIENTATION
ORIENTATION: LEFT

## 2024-08-29 ASSESSMENT — PAIN SCALES - GENERAL
PAINLEVEL_OUTOF10: 8
PAINLEVEL_OUTOF10: 7

## 2024-08-29 ASSESSMENT — PAIN DESCRIPTION - DESCRIPTORS
DESCRIPTORS: ACHING;BURNING;SHARP
DESCRIPTORS: SHOOTING
DESCRIPTORS: PRESSURE
DESCRIPTORS: ACHING

## 2024-08-29 NOTE — CONSULTS
Department of General Surgery   Surgical Service Dr. Cha   Consult Note    Date of Consult: 8/29/24    Reason for Consult:  left foot wound  Requesting Physician:  Dr. Hsieh    CHIEF COMPLAINT:  worsening left foot wound    History Obtained From:  patient    HISTORY OF PRESENT ILLNESS:    The patient is a 49 y.o. male who presented with worsening left foot wound.  He has a history of multiple prior foot surgeries.  His last surgery was in April when he underwent left fifth metatarsal excision by Dr. Forrest.  At that time they had brought up the option of below-knee amputation but decided to continue with foot salvage.  He had been doing well on wound care.  He reports worsening pain and swelling in the left foot.  He was sent to the ER where MRI showed:  IMPRESSION:     1. Osteomyelitis of the cuboid, lateral cuneiform and proximal fourth  metatarsal.  2. Tenosynovitis of tibialis posterior, flexor hallucis longus,, peroneus brevis  and longus tendon sheaths with tendon remaining intact.  3. Moderate cellulitis.  4. Draining abscess at the lateral midfoot adjacent to cuboid and fourth  metatarsal.  5. Amputation of fifth metatarsal and proximal and mid first metatarsal          Past Medical History:    Past Medical History:   Diagnosis Date    Abscess 2010    scrotal    Acute renal failure (ARF) ([facility]) 08/04/2019    Anxiety associated with depression     Anxiety associated with depression     Back pain 07/02/2012    CAD (coronary artery disease) 02/10/2023    Chest pain 05/01/2013    Diabetic nephropathy ([facility])     Diabetic neuropathy ([facility]) 12/22/2011    Diabetic ulcer of left midfoot associated with type 2 diabetes mellitus, with fat layer exposed ([facility]) 07/18/2017    Diverticulosis     C scope + Dr. Medina    DM (diabetes mellitus), type 2 ([facility]) 2002    DR. 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  Wound care photos reviewed.  The wound appears clean without necrotic tissue present.    Neuro: no focal deficits noted  Psych: mood normal        DATA:    Lab Results   Component Value Date    WBC 5.6 08/29/2024    HGB 8.1 (L) 08/29/2024    HCT 26.1 (L) 08/29/2024    MCV 95.6 08/29/2024     08/29/2024     Lab Results   Component Value Date/Time     08/29/2024 05:26 AM    K 4.5 08/29/2024 05:26 AM    CL 96 08/29/2024 05:26 AM    CO2 27 08/29/2024 05:26 AM    BUN 54 08/29/2024 05:26 AM    CREATININE 6.2 08/29/2024 05:26 AM    GLUCOSE 139 08/29/2024 05:26 AM    CALCIUM 8.5 08/29/2024 05:26 AM          IMPRESSION:    49-year-old male with left foot osteomyelitis.  Osteomyelitis is involving the cuboid bone, quite deep into the foot.  I do not think further surgical debridement would be of benefit.     Patient Active Problem List:     Hiatal hernia     Diabetes mellitus type 2, improved controlled     Diabetic neuropathy (HCC)     Panic attack     Anxiety associated with depression     Neck pain     DANIELA (obstructive sleep apnea)     Urinary frequency     Bilateral carpal tunnel syndrome- left worse than right     Hyperlipidemia with target LDL less than 100     Essential hypertension     Bronchitis     Osteomyelitis (HCC)     Abscess     Periumbilical hernia     Sepsis (HCC)     Cellulitis of left foot     Constipation     Intractable nausea and vomiting     Uncontrolled type 2 diabetes mellitus     Nausea and vomiting     Diabetic foot infection (HCC)     Acute renal failure (ARF) (HCC)     Cellulitis     Cellulitis of left arm     Cellulitis,

## 2024-08-29 NOTE — PROGRESS NOTES
Physician Progress Note      PATIENT:               MICHELINE WILSON  [facility] #:                  [id number]  :                       1975  ADMIT DATE:       2024 2:45 PM  DISCH DATE:  RESPONDING  PROVIDER #:        Melody CRUZ DO          QUERY TEXT:    Patient admitted with Osteomyelitis. Noted documentation of Hyponatremia in   Consult notes on . In order to support the diagnosis of Hyponatremia,   please include additional clinical indicators in your documentation.  Or   please document if the diagnosis of Hyponatremia has been ruled out after   further study.    The medical record reflects the following:  Risk Factors: Hypocalcemia, ESRD, anemia.  Clinical Indicators: Consult notes on , Hyponatremia.  Lab values Na 134, 136.  Treatment: IVfs, Monitor lab values.    ThanksSharifa AHS, CDS.  Options provided:  -- Hyponatremia present as evidenced by, Please document evidence.  -- Hyponatremia was ruled out  -- Other - I will add my own diagnosis  -- Disagree - Not applicable / Not valid  -- Disagree - Clinically unable to determine / Unknown  -- Refer to Clinical Documentation Reviewer    PROVIDER RESPONSE TEXT:    Hyponatremia is present as evidenced by [id number] IS HYPONATREMIA. THX    Query created by: Sharifa Mcdonald on 2024 8:18 AM      Electronically signed by:  Melody CRUZ DO 2024 8:22 AM

## 2024-08-29 NOTE — PROGRESS NOTES
PHARMACY VANCOMYCIN MONITORING SERVICE  Pharmacy consulted by Earnest Shafer  for monitoring and adjustment.    Indication for treatment: Vancomycin indication: Bone/Joint infection   Goal trough: Trough Goal: 10-15 mcg/mL  AUC/PAMELA: <500    Risk Factors for MRSA Identified:   Hospitalization within the past 90 days, Received IV antibiotics within the past 90 days, Patient on hemodialysis    Pertinent Laboratory Values:   Temp Readings from Last 3 Encounters:   08/29/24 98 °F (36.7 °C) (Oral)   08/27/24 98.5 °F (36.9 °C) (Temporal)   08/13/24 98 °F (36.7 °C) (Temporal)     Recent Labs     08/27/24  1630 08/28/24  1643 08/29/24  0526   WBC 9.1 6.9 5.6     Recent Labs     08/27/24  1630 08/28/24  1643 08/29/24  0526   BUN 68* 40* 54*   CREATININE 7.2* 5.2* 6.2*     Estimated Creatinine Clearance: 18 mL/min (A) (based on SCr of 6.2 mg/dL (H)).  No intake or output data in the 24 hours ending 08/29/24 1344  Last Encounter Weight:  Wt Readings from Last 3 Encounters:   08/27/24 115.7 kg (255 lb)   06/12/24 125.5 kg (276 lb 10.8 oz)   05/22/24 117.9 kg (260 lb)       Pertinent Cultures:   Date    Source    Results  8/27   Blood    NG at 24 hours  8/27   urine                          No Growth           Vancomycin level:   TROUGH:  No results for input(s): \"VANCOTROUGH\" in the last 72 hours.  RANDOM:    Recent Labs     08/28/24  0541   VANCORANDOM 19.8       Assessment:  HPI: Intractable left foot pain with left foot x-ray showing signs of lateral midfoot ulceration concerning for possible osteomyelitis, Diabetes mellitus, ESRD on hemodialysis, Chronic pain disorder   SCr, BUN, and urine output:   ESRD on HD MWF  Day(s) of therapy; 3  Vancomycin concentration:  08/28: 19.8 mg/L (pre-HD level after loading dose)    Plan:  iHD scheduled every MWF  Will dose intermittently with HD.  No dose/level today. Level tomorrow prior to HD.  Pharmacy will continue to monitor patient and adjust therapy as indicated    VANCOMYCIN  CONCENTRATION SCHEDULED FOR 8/30 @0600    Thank you for the consult.      Renu Robledo, PharmD, RP, BCPS  Main Pharmacy: [id number]  8/29/2024 1:44 PM

## 2024-08-29 NOTE — PROGRESS NOTES
Nephrology Progress Note        [address] [facility], [address] [place] [address]  Phone: [phone]  Office Hours: 8:30AM - 4:30PM  Monday - Friday 8/29/2024 8:20 AM  Subjective:   Admit Date: 8/27/2024  PCP: José Luis Izquierdo MD  Interval History:   On NC  Doing well  Had HD yesterday    Diet: ADULT DIET; Regular; 5 carb choices (75 gm/meal); No Added Salt (3-4 gm); 1500 ml      Data:   Scheduled Meds:   metroNIDAZOLE  500 mg Oral 3 times per day    calcitRIOL  1 mcg Oral Daily    sevelamer  800 mg Oral TID WC    [Held by provider] amiodarone  200 mg Oral Daily    [Held by provider] amLODIPine  5 mg Oral Daily    atorvastatin  40 mg Oral Nightly    [Held by provider] clopidogrel  75 mg Oral Daily    hydrALAZINE  25 mg Oral 3 times per day    [Held by provider] isosorbide mononitrate  30 mg Oral Daily    insulin glargine  5 Units SubCUTAneous Nightly    insulin lispro  0-4 Units SubCUTAneous TID WC    insulin lispro  0-4 Units SubCUTAneous Nightly    sodium chloride flush  5-40 mL IntraVENous 2 times per day    epoetin paola-epbx  10,000 Units IntraVENous Once per day on Monday Wednesday Friday    cefepime  1,000 mg IntraVENous Q24H    heparin (porcine)  5,000 Units SubCUTAneous 3 times per day    vancomycin (VANCOCIN) intermittent dosing (placeholder)   Other RX Placeholder     Continuous Infusions:   dextrose      sodium chloride 50 mL (08/28/24 2226)     PRN Meds:oxyCODONE-acetaminophen, glucose, dextrose bolus **OR** dextrose bolus, glucagon (rDNA), dextrose, sodium chloride flush, sodium chloride, ondansetron **OR** ondansetron, polyethylene glycol, acetaminophen **OR** acetaminophen  I/O last 3 completed shifts:  In: 4500 [I.V.:4000]  Out: 4000   No intake/output data recorded.    Intake/Output Summary (Last 24 hours) at 8/29/2024 0820  Last data filed at 8/28/2024 1329  Gross per 24 hour   Intake 4500 ml   Output 4000 ml   Net 500 ml       CBC:   Recent Labs     08/27/24  1630

## 2024-08-30 ENCOUNTER — APPOINTMENT (OUTPATIENT)
Dept: NON INVASIVE DIAGNOSTICS | Age: 49
DRG: 982 | End: 2024-08-30
Attending: STUDENT IN AN ORGANIZED HEALTH CARE EDUCATION/TRAINING PROGRAM
Payer: MEDICARE

## 2024-08-30 LAB
ANION GAP SERPL CALCULATED.3IONS-SCNC: 18 MMOL/L (ref 7–16)
BASOPHILS ABSOLUTE: 0 K/CU MM
BASOPHILS RELATIVE PERCENT: 0.5 % (ref 0–1)
BUN SERPL-MCNC: 73 MG/DL (ref 6–23)
CALCIUM SERPL-MCNC: 8.1 MG/DL (ref 8.3–10.6)
CHLORIDE BLD-SCNC: 94 MMOL/L (ref 99–110)
CO2: 25 MMOL/L (ref 21–32)
CREAT SERPL-MCNC: 7.7 MG/DL (ref 0.9–1.3)
DIFFERENTIAL TYPE: ABNORMAL
DOSE AMOUNT: NORMAL
DOSE TIME: NORMAL
ECHO BSA: 2.37 M2
ECHO EST RA PRESSURE: 3 MMHG
ECHO LV EDV A4C: 140 ML
ECHO LV EDV INDEX A4C: 61 ML/M2
ECHO LV EF PHYSICIAN: 50 %
ECHO LV EJECTION FRACTION A4C: 47 %
ECHO LV ESV A4C: 74 ML
ECHO LV ESV INDEX A4C: 32 ML/M2
ECHO LV FRACTIONAL SHORTENING: 21 % (ref 28–44)
ECHO LV INTERNAL DIMENSION DIASTOLE INDEX: 2.05 CM/M2
ECHO LV INTERNAL DIMENSION DIASTOLIC: 4.7 CM (ref 4.2–5.9)
ECHO LV INTERNAL DIMENSION SYSTOLIC INDEX: 1.62 CM/M2
ECHO LV INTERNAL DIMENSION SYSTOLIC: 3.7 CM
ECHO LV IVSD: 1.5 CM (ref 0.6–1)
ECHO LV MASS 2D: 265.2 G (ref 88–224)
ECHO LV MASS INDEX 2D: 115.8 G/M2 (ref 49–115)
ECHO LV POSTERIOR WALL DIASTOLIC: 1.3 CM (ref 0.6–1)
ECHO LV RELATIVE WALL THICKNESS RATIO: 0.55
ECHO RIGHT VENTRICULAR SYSTOLIC PRESSURE (RVSP): 42 MMHG
ECHO TV REGURGITANT MAX VELOCITY: 3.13 M/S
ECHO TV REGURGITANT PEAK GRADIENT: 39 MMHG
EOSINOPHILS ABSOLUTE: 0.1 K/CU MM
EOSINOPHILS RELATIVE PERCENT: 1.9 % (ref 0–3)
GFR, ESTIMATED: 8 ML/MIN/1.73M2
GLUCOSE BLD-MCNC: 121 MG/DL (ref 70–99)
GLUCOSE BLD-MCNC: 146 MG/DL (ref 70–99)
GLUCOSE BLD-MCNC: 189 MG/DL (ref 70–99)
GLUCOSE BLD-MCNC: 248 MG/DL (ref 70–99)
GLUCOSE SERPL-MCNC: 166 MG/DL (ref 70–99)
HCT VFR BLD CALC: 27.3 % (ref 42–52)
HEMOGLOBIN: 8.6 GM/DL (ref 13.5–18)
IMMATURE NEUTROPHIL %: 0.3 % (ref 0–0.43)
LYMPHOCYTES ABSOLUTE: 0.4 K/CU MM
LYMPHOCYTES RELATIVE PERCENT: 6.8 % (ref 24–44)
MCH RBC QN AUTO: 29.8 PG (ref 27–31)
MCHC RBC AUTO-ENTMCNC: 31.5 % (ref 32–36)
MCV RBC AUTO: 94.5 FL (ref 78–100)
MONOCYTES ABSOLUTE: 0.5 K/CU MM
MONOCYTES RELATIVE PERCENT: 7.8 % (ref 0–4)
NEUTROPHILS ABSOLUTE: 4.8 K/CU MM
NEUTROPHILS RELATIVE PERCENT: 82.7 % (ref 36–66)
NUCLEATED RBC %: 0 %
PDW BLD-RTO: 15.4 % (ref 11.7–14.9)
PLATELET # BLD: 186 K/CU MM (ref 140–440)
PMV BLD AUTO: 9.6 FL (ref 7.5–11.1)
POTASSIUM SERPL-SCNC: 4.9 MMOL/L (ref 3.5–5.1)
RBC # BLD: 2.89 M/CU MM (ref 4.6–6.2)
SODIUM BLD-SCNC: 137 MMOL/L (ref 135–145)
TOTAL IMMATURE NEUTOROPHIL: 0.02 K/CU MM
TOTAL NUCLEATED RBC: 0 K/CU MM
VANCOMYCIN TROUGH: 17 UG/ML (ref 10–20)
WBC # BLD: 5.8 K/CU MM (ref 4–10.5)

## 2024-08-30 PROCEDURE — 87040 BLOOD CULTURE FOR BACTERIA: CPT

## 2024-08-30 PROCEDURE — 2500000003 HC RX 250 WO HCPCS: Performed by: STUDENT IN AN ORGANIZED HEALTH CARE EDUCATION/TRAINING PROGRAM

## 2024-08-30 PROCEDURE — 80202 ASSAY OF VANCOMYCIN: CPT

## 2024-08-30 PROCEDURE — 6370000000 HC RX 637 (ALT 250 FOR IP): Performed by: STUDENT IN AN ORGANIZED HEALTH CARE EDUCATION/TRAINING PROGRAM

## 2024-08-30 PROCEDURE — 87077 CULTURE AEROBIC IDENTIFY: CPT

## 2024-08-30 PROCEDURE — 94761 N-INVAS EAR/PLS OXIMETRY MLT: CPT

## 2024-08-30 PROCEDURE — 90935 HEMODIALYSIS ONE EVALUATION: CPT

## 2024-08-30 PROCEDURE — 6370000000 HC RX 637 (ALT 250 FOR IP)

## 2024-08-30 PROCEDURE — 6360000002 HC RX W HCPCS: Performed by: INTERNAL MEDICINE

## 2024-08-30 PROCEDURE — 6370000000 HC RX 637 (ALT 250 FOR IP): Performed by: INTERNAL MEDICINE

## 2024-08-30 PROCEDURE — 87075 CULTR BACTERIA EXCEPT BLOOD: CPT

## 2024-08-30 PROCEDURE — 87070 CULTURE OTHR SPECIMN AEROBIC: CPT

## 2024-08-30 PROCEDURE — 99232 SBSQ HOSP IP/OBS MODERATE 35: CPT | Performed by: INTERNAL MEDICINE

## 2024-08-30 PROCEDURE — 93308 TTE F-UP OR LMTD: CPT | Performed by: INTERNAL MEDICINE

## 2024-08-30 PROCEDURE — 6360000002 HC RX W HCPCS

## 2024-08-30 PROCEDURE — 97605 NEG PRS WND THER DME<=50SQCM: CPT

## 2024-08-30 PROCEDURE — 93308 TTE F-UP OR LMTD: CPT

## 2024-08-30 PROCEDURE — 99232 SBSQ HOSP IP/OBS MODERATE 35: CPT | Performed by: PHYSICIAN ASSISTANT

## 2024-08-30 PROCEDURE — 93321 DOPPLER ECHO F-UP/LMTD STD: CPT | Performed by: INTERNAL MEDICINE

## 2024-08-30 PROCEDURE — 36415 COLL VENOUS BLD VENIPUNCTURE: CPT

## 2024-08-30 PROCEDURE — 2580000003 HC RX 258: Performed by: STUDENT IN AN ORGANIZED HEALTH CARE EDUCATION/TRAINING PROGRAM

## 2024-08-30 PROCEDURE — 83036 HEMOGLOBIN GLYCOSYLATED A1C: CPT

## 2024-08-30 PROCEDURE — 82962 GLUCOSE BLOOD TEST: CPT

## 2024-08-30 PROCEDURE — 6360000002 HC RX W HCPCS: Performed by: STUDENT IN AN ORGANIZED HEALTH CARE EDUCATION/TRAINING PROGRAM

## 2024-08-30 PROCEDURE — 93325 DOPPLER ECHO COLOR FLOW MAPG: CPT | Performed by: INTERNAL MEDICINE

## 2024-08-30 PROCEDURE — 1200000000 HC SEMI PRIVATE

## 2024-08-30 PROCEDURE — 85025 COMPLETE CBC W/AUTO DIFF WBC: CPT

## 2024-08-30 PROCEDURE — 80048 BASIC METABOLIC PNL TOTAL CA: CPT

## 2024-08-30 PROCEDURE — 2580000003 HC RX 258

## 2024-08-30 PROCEDURE — 87186 SC STD MICRODIL/AGAR DIL: CPT

## 2024-08-30 PROCEDURE — 6370000000 HC RX 637 (ALT 250 FOR IP): Performed by: NURSE PRACTITIONER

## 2024-08-30 PROCEDURE — 2700000000 HC OXYGEN THERAPY PER DAY

## 2024-08-30 PROCEDURE — 87076 CULTURE ANAEROBE IDENT EACH: CPT

## 2024-08-30 RX ORDER — HEPARIN SODIUM 5000 [USP'U]/ML
5000 INJECTION, SOLUTION INTRAVENOUS; SUBCUTANEOUS
Status: DISCONTINUED | OUTPATIENT
Start: 2024-08-30 | End: 2024-08-30

## 2024-08-30 RX ORDER — HEPARIN SODIUM 1000 [USP'U]/ML
5000 INJECTION, SOLUTION INTRAVENOUS; SUBCUTANEOUS
Status: COMPLETED | OUTPATIENT
Start: 2024-08-30 | End: 2024-08-30

## 2024-08-30 RX ORDER — CALCIUM CARBONATE 500 MG/1
500 TABLET, CHEWABLE ORAL 3 TIMES DAILY PRN
Status: DISCONTINUED | OUTPATIENT
Start: 2024-08-30 | End: 2024-09-01

## 2024-08-30 RX ADMIN — SODIUM CHLORIDE 1250 MG: 9 INJECTION, SOLUTION INTRAVENOUS at 16:59

## 2024-08-30 RX ADMIN — HYDROMORPHONE HYDROCHLORIDE 0.5 MG: 1 INJECTION, SOLUTION INTRAMUSCULAR; INTRAVENOUS; SUBCUTANEOUS at 18:52

## 2024-08-30 RX ADMIN — CEFEPIME 1000 MG: 1 INJECTION, POWDER, FOR SOLUTION INTRAMUSCULAR; INTRAVENOUS at 21:08

## 2024-08-30 RX ADMIN — SODIUM CHLORIDE, PRESERVATIVE FREE 10 ML: 5 INJECTION INTRAVENOUS at 20:58

## 2024-08-30 RX ADMIN — HYDRALAZINE HYDROCHLORIDE 25 MG: 25 TABLET ORAL at 05:11

## 2024-08-30 RX ADMIN — CALCIUM CARBONATE 500 MG: 500 TABLET, CHEWABLE ORAL at 05:11

## 2024-08-30 RX ADMIN — ONDANSETRON 4 MG: 2 INJECTION INTRAMUSCULAR; INTRAVENOUS at 21:04

## 2024-08-30 RX ADMIN — SEVELAMER CARBONATE 800 MG: 800 TABLET, FILM COATED ORAL at 14:49

## 2024-08-30 RX ADMIN — HEPARIN SODIUM 5000 UNITS: 5000 INJECTION INTRAVENOUS; SUBCUTANEOUS at 20:58

## 2024-08-30 RX ADMIN — SODIUM CHLORIDE, PRESERVATIVE FREE 10 ML: 5 INJECTION INTRAVENOUS at 08:14

## 2024-08-30 RX ADMIN — METRONIDAZOLE 500 MG: 250 TABLET ORAL at 14:50

## 2024-08-30 RX ADMIN — ONDANSETRON 4 MG: 2 INJECTION INTRAMUSCULAR; INTRAVENOUS at 09:18

## 2024-08-30 RX ADMIN — ONDANSETRON 4 MG: 2 INJECTION INTRAMUSCULAR; INTRAVENOUS at 03:08

## 2024-08-30 RX ADMIN — HEPARIN SODIUM 5000 UNITS: 5000 INJECTION INTRAVENOUS; SUBCUTANEOUS at 05:15

## 2024-08-30 RX ADMIN — INSULIN GLARGINE 5 UNITS: 100 INJECTION, SOLUTION SUBCUTANEOUS at 20:58

## 2024-08-30 RX ADMIN — ATORVASTATIN CALCIUM 40 MG: 40 TABLET, FILM COATED ORAL at 20:58

## 2024-08-30 RX ADMIN — SODIUM CHLORIDE: 9 INJECTION, SOLUTION INTRAVENOUS at 16:56

## 2024-08-30 RX ADMIN — METRONIDAZOLE 500 MG: 250 TABLET ORAL at 20:58

## 2024-08-30 RX ADMIN — OXYCODONE AND ACETAMINOPHEN 1 TABLET: 7.5; 325 TABLET ORAL at 03:05

## 2024-08-30 RX ADMIN — HYDRALAZINE HYDROCHLORIDE 25 MG: 25 TABLET ORAL at 14:49

## 2024-08-30 RX ADMIN — SEVELAMER CARBONATE 800 MG: 800 TABLET, FILM COATED ORAL at 08:15

## 2024-08-30 RX ADMIN — OXYCODONE AND ACETAMINOPHEN 1 TABLET: 7.5; 325 TABLET ORAL at 22:44

## 2024-08-30 RX ADMIN — SEVELAMER CARBONATE 800 MG: 800 TABLET, FILM COATED ORAL at 17:29

## 2024-08-30 RX ADMIN — HYDROMORPHONE HYDROCHLORIDE 0.5 MG: 1 INJECTION, SOLUTION INTRAMUSCULAR; INTRAVENOUS; SUBCUTANEOUS at 05:20

## 2024-08-30 RX ADMIN — OXYCODONE AND ACETAMINOPHEN 1 TABLET: 7.5; 325 TABLET ORAL at 08:14

## 2024-08-30 RX ADMIN — EPOETIN ALFA-EPBX 10000 UNITS: 10000 INJECTION, SOLUTION INTRAVENOUS; SUBCUTANEOUS at 09:35

## 2024-08-30 RX ADMIN — HEPARIN SODIUM 5000 UNITS: 1000 INJECTION INTRAVENOUS; SUBCUTANEOUS at 09:35

## 2024-08-30 RX ADMIN — HYDRALAZINE HYDROCHLORIDE 25 MG: 25 TABLET ORAL at 20:58

## 2024-08-30 RX ADMIN — METRONIDAZOLE 500 MG: 250 TABLET ORAL at 05:11

## 2024-08-30 RX ADMIN — CALCIUM CARBONATE 500 MG: 500 TABLET, CHEWABLE ORAL at 21:02

## 2024-08-30 RX ADMIN — AMLODIPINE BESYLATE 5 MG: 5 TABLET ORAL at 08:15

## 2024-08-30 RX ADMIN — OXYCODONE AND ACETAMINOPHEN 1 TABLET: 7.5; 325 TABLET ORAL at 14:50

## 2024-08-30 RX ADMIN — CALCITRIOL CAPSULES 0.25 MCG 1 MCG: 0.25 CAPSULE ORAL at 14:50

## 2024-08-30 ASSESSMENT — PAIN DESCRIPTION - ONSET
ONSET: ON-GOING

## 2024-08-30 ASSESSMENT — PAIN SCALES - GENERAL
PAINLEVEL_OUTOF10: 7
PAINLEVEL_OUTOF10: 7
PAINLEVEL_OUTOF10: 8
PAINLEVEL_OUTOF10: 4
PAINLEVEL_OUTOF10: 8
PAINLEVEL_OUTOF10: 5
PAINLEVEL_OUTOF10: 0
PAINLEVEL_OUTOF10: 8
PAINLEVEL_OUTOF10: 4
PAINLEVEL_OUTOF10: 5
PAINLEVEL_OUTOF10: 8
PAINLEVEL_OUTOF10: 6

## 2024-08-30 ASSESSMENT — PAIN DESCRIPTION - DESCRIPTORS
DESCRIPTORS: ACHING;BURNING
DESCRIPTORS: BURNING
DESCRIPTORS: ACHING;BURNING

## 2024-08-30 ASSESSMENT — PAIN SCALES - WONG BAKER
WONGBAKER_NUMERICALRESPONSE: NO HURT

## 2024-08-30 ASSESSMENT — PAIN DESCRIPTION - PAIN TYPE
TYPE: ACUTE PAIN

## 2024-08-30 ASSESSMENT — PAIN DESCRIPTION - LOCATION
LOCATION: FOOT;LEG
LOCATION: LEG;FOOT
LOCATION: FOOT;LEG
LOCATION: LEG;FOOT

## 2024-08-30 ASSESSMENT — PAIN DESCRIPTION - FREQUENCY
FREQUENCY: CONTINUOUS

## 2024-08-30 ASSESSMENT — PAIN DESCRIPTION - ORIENTATION
ORIENTATION: LEFT

## 2024-08-30 NOTE — PROGRESS NOTES
V2.0  [facility] Hospitalist Progress Note      Name:  Yovanny Levine /Age/Sex: 1975  (49 y.o. male)   MRN & CSN:  [id number] & [id number] Encounter Date/Time: 2024 2:48 PM EDT    [facility]:  [facility] PCP: José Luis Izquierdo MD       Hospital Day: 3      Subjective:     Chief Complaint:  Leg Pain (States leg pain since yesterday)     MRI l foot confirmed OM of cuboid, lateral cunfeiform and proximal fourth metatarsal.   Pt complains of pain, otherwise no new complaints or concerns.      Assessment and Plan:   Left foot cellulitis, w osteomyelitis of cuboid, lateral cunfeiform and proximal fourth metatarsal. Came w worsening l foot pain. Left foot x-ray showing signs of lateral midfoot ulceration concerning for possible osteomyelitis.  MRI confirmed diagnosis  - ID consulted  - now on vanc+cefepime+flagyl  - f/u blood cxX2; now growing MRSA   - pain control  - general surgery consulted      ESRD on hemodialysis   nephrology has been consulted    History of chronic left-sided foot osteomyelitis   Has wound VAC in place since last admission.     - plan as above  - consulted general surgery as above  - wound care consulted    Coronary artery disease with history of PCI and stenting to LAD and LCx continue Plavix statin and Coreg    Diabetes mellitus insulin-dependent   Continue the patient on Lantus of 5 units and sliding scale insulin monitor for hypoglycemia      Hyperlipidemia on statin    Chronic pain disorder on home Percocet, added Dilaudid for breakthrough pain for now    Essential hypertension on amlodipine carvedilol and hydralazine along with Imdur        Personally reviewed Lab Studies and Imaging     Imaging that was interpreted personally includes L foot x-ray and results as above    Drugs that require monitoring for toxicity include vanc and the method of monitoring was renal      Diet ADULT DIET; Regular; 5 carb choices (75 gm/meal); No Added Salt (3-4 gm); 1500 ml   DVT Prophylaxis [x]  tablet, Q6H PRN  glucose, 4 tablet, PRN  dextrose bolus, 125 mL, PRN   Or  dextrose bolus, 250 mL, PRN  glucagon (rDNA), 1 mg, PRN  dextrose, , Continuous PRN  sodium chloride flush, 5-40 mL, PRN  sodium chloride, , PRN  ondansetron, 4 mg, Q8H PRN   Or  ondansetron, 4 mg, Q6H PRN  polyethylene glycol, 17 g, Daily PRN  acetaminophen, 650 mg, Q6H PRN   Or  acetaminophen, 650 mg, Q6H PRN        Labs      Recent Results (from the past 24 hour(s))   Basic Metabolic Panel w/ Reflex to MG    Collection Time: 08/29/24  5:26 AM   Result Value Ref Range    Sodium 136 135 - 145 MMOL/L    Potassium 4.5 3.5 - 5.1 MMOL/L    Chloride 96 (L) 99 - 110 mMol/L    CO2 27 21 - 32 MMOL/L    Anion Gap 13 7 - 16    Glucose 139 (H) 70 - 99 MG/DL    BUN 54 (H) 6 - 23 MG/DL    Creatinine 6.2 (H) 0.9 - 1.3 MG/DL    Est, Glom Filt Rate 10 (L) >60 mL/min/1.73m2    Calcium 8.5 8.3 - 10.6 MG/DL   CBC with Auto Differential    Collection Time: 08/29/24  5:26 AM   Result Value Ref Range    WBC 5.6 4.0 - 10.5 K/CU MM    RBC 2.73 (L) 4.6 - 6.2 M/CU MM    Hemoglobin 8.1 (L) 13.5 - 18.0 GM/DL    Hematocrit 26.1 (L) 42 - 52 %    MCV 95.6 78 - 100 FL    MCH 29.7 27 - 31 PG    MCHC 31.0 (L) 32.0 - 36.0 %    RDW 15.7 (H) 11.7 - 14.9 %    Platelets 188 140 - 440 K/CU MM    MPV 10.1 7.5 - 11.1 FL    Differential Type AUTOMATED DIFFERENTIAL     Neutrophils % 75.7 (H) 36 - 66 %    Lymphocytes % 7.1 (L) 24 - 44 %    Monocytes % 12.8 (H) 0 - 4 %    Eosinophils % 3.4 (H) 0 - 3 %    Basophils % 0.5 0 - 1 %    Neutrophils Absolute 4.3 K/CU MM    Lymphocytes Absolute 0.4 K/CU MM    Monocytes Absolute 0.7 K/CU MM    Eosinophils Absolute 0.2 K/CU MM    Basophils Absolute 0.0 K/CU MM    Nucleated RBC % 0.0 %    Total Nucleated RBC 0.0 K/CU MM    Total Immature Neutrophil 0.03 K/CU MM    Immature Neutrophil % 0.5 (H) 0 - 0.43 %   POCT Glucose    Collection Time: 08/29/24 11:31 AM   Result Value Ref Range    POC Glucose 161 (H) 70 - 99 MG/DL   POCT Glucose    Collection

## 2024-08-30 NOTE — PROGRESS NOTES
Infectious Disease Progress Note  2024   Patient Name: Yovanny Levine : 1975     Assessment  Left foot diabetic ulcer infection  2024: Status post left fifth toe metatarsal amputation, excisional debridement of diabetic foot ulcer performed by Dr. TOBIAS Forrest  2023: Amputation of left great toe through mid mid metatarsal with drainage of plantar abscess and debridement of skin and subcutaneous tissue, muscle and fascia, performed by Dr. Hsieh.  Previous wound cultures over the past year have isolated Serratia marcescens, Enterococcus faecium, corynebacterium striatum, Porphyromonas asaccharolyticus/uenonis, Oligella urethralis, Proteus vulgaris, Proteus penneri, Enterococcus faecalis, Finegoldia magna, MSSA  Left foot pain and groin pain, suggestive of cellulitis and lymphadenitis  MRSA bacteremia  2024, 1 of 4 bottles.  Possibly secondary to left diabetic ulcer wound infection.  ESRD on hemodialysis  Right foot Charcot  Type 2 diabetes mellitus with polyneuropathy  Obesity class II  Comorbid conditions:      Plan  Therapeutic:  Continue intravenous vancomycin (-), cefepime (-), and metronidazole (-).  Diagnostic:  Wound culture when wound VAC is changed  Repeat blood cultures every 48 hours.  TTE  F/u:  MRI of the left ankle  Other:     Reason for visit: F/u left diabetic ulcer infection  History:?Interval history noted  Denies n/v/d/f or untoward effects of antimicrobials  Physical Exam:  Vital Signs: BP (!) 177/88   Pulse 78   Temp 98.2 °F (36.8 °C) (Oral)   Resp 17   Ht 1.753 m (5' 9\")   Wt 115.7 kg (255 lb)   SpO2 100%   BMI 37.66 kg/m²     Gen: alert and oriented X3, no distress  Skin: no stigmata of endocarditis  Wounds: left foot wound VAC  HEMT: AT/NC Oropharynx pink, moist, and without lesions or exudates; dentition in poor state of repair  Eyes: PERRLA, EOMI, conjunctiva pink, sclera anicteric.   Neck: Supple. Trachea midline. No LAD.  Chest: no distress and

## 2024-08-30 NOTE — PROGRESS NOTES
V2.0  [facility] Hospitalist Progress Note      Name:  Yovanny Levine /Age/Sex: 1975  (49 y.o. male)   MRN & CSN:  [id number] & [id number] Encounter Date/Time: 2024 2:48 PM EDT    [facility]:  [facility]- PCP: José Luis Izquierdo MD       Hospital Day: 4      Subjective:     Chief Complaint:  Leg Pain (States leg pain since yesterday)     Seen by surgery no plan for surgery for now.  Blood cx growing MRSA.     Pt complains of pain, otherwise no new complaints or concerns.    Denies diarrhea.     Assessment and Plan:   Left foot cellulitis, w osteomyelitis of cuboid, lateral cunfeiform and proximal fourth metatarsal. Came w worsening l foot pain. Left foot x-ray showing signs of lateral midfoot ulceration concerning for possible osteomyelitis.  MRI confirmed diagnosis  - ID consulted  - now on vanc+cefepime+flagyl  - f/u blood cxX2; now growing MRSA   - pain control  - general surgery consulted    MRSA bacteremia  Blood cx 2024  MRSA  - on vancomycin as above  - repeat blood cxX2  - ECHO limited ordered  - ID following    ESRD on hemodialysis   nephrology has been consulted    History of chronic left-sided foot osteomyelitis   Has wound VAC in place since last admission.     - plan as above  - consulted general surgery as above  - wound care consulted    Coronary artery disease with history of PCI and stenting to LAD and LCx continue Plavix statin and Coreg    Diabetes mellitus insulin-dependent   Continue the patient on Lantus of 5 units and sliding scale insulin monitor for hypoglycemia      Hyperlipidemia on statin    Chronic pain disorder on home Percocet, added Dilaudid for breakthrough pain for now    Essential hypertension on home amlodipine carvedilol and hydralazine along with Imdur  For now on amlodipine, hydralazine, only        Personally reviewed Lab Studies and Imaging     Imaging that was interpreted personally includes L foot x-ray and results as above    Drugs that require monitoring  % 1.9 0 - 3 %    Basophils % 0.5 0 - 1 %    Neutrophils Absolute 4.8 K/CU MM    Lymphocytes Absolute 0.4 K/CU MM    Monocytes Absolute 0.5 K/CU MM    Eosinophils Absolute 0.1 K/CU MM    Basophils Absolute 0.0 K/CU MM    Nucleated RBC % 0.0 %    Total Nucleated RBC 0.0 K/CU MM    Total Immature Neutrophil 0.02 K/CU MM    Immature Neutrophil % 0.3 0 - 0.43 %   Vancomycin Level, Trough    Collection Time: 08/30/24  3:01 AM   Result Value Ref Range    Vancomycin Tr 17.0 10 - 20 UG/ML   POCT Glucose    Collection Time: 08/30/24  7:46 AM   Result Value Ref Range    POC Glucose 146 (H) 70 - 99 MG/DL   POCT Glucose    Collection Time: 08/30/24  2:40 PM   Result Value Ref Range    POC Glucose 121 (H) 70 - 99 MG/DL        Imaging/Diagnostics Last 24 Hours   Vascular duplex lower extremity venous left    Result Date: 8/27/2024  Left lower extremity venous ultrasound INDICATION:  Pain and swelling. Left leg pain COMPARISON:  None Doppler ultrasound of the left lower extremity was performed. FINDINGS:  There is normal flow in the great saphenous, common femoral, femoral, and popliteal veins. Normal compression and augmentation is demonstrated. The proximal calf veins are also patent. Additional notation made of mildly prominent nonpathologically enlarged left groin lymph nodes all of which demonstrate a short axis of less than 10 mm.     No evidence of deep venous thrombosis in the left lower extremity Electronically signed by Gonzales Simons MD    XR FOOT LEFT (MIN 3 VIEWS)    Result Date: 8/27/2024  Left Foot INDICATION: Trauma COMPARISON: X-ray foot June 2, 2024 TECHNIQUE: Three views of the left foot were obtained FINDINGS: Redemonstrated amputations from the proximal first metatarsal, at the fourth MTP joint, and the entire fifth ray. Amputation of the second metatarsal head. Soft tissue swelling and ulceration over the lateral midfoot with erosions at the base of the fourth metatarsal and cuboid. No evidence of acute fracture  or dislocation.     Redemonstrated amputations described above. Lateral midfoot ulceration with associated erosive changes at the base of the fourth metatarsal and cuboid. Concerning for possible osteomyelitis. Correlate clinically. Consider MRI for further assessment. Electronically signed by DANA BANERJEE      Electronically signed by Jori Carbajal MD on 8/30/2024 at 2:58 PM

## 2024-08-30 NOTE — PROGRESS NOTES
Nephrology Progress Note        [address], [address]  [place] [address]  Phone: [phone]  Office Hours: 8:30AM - 4:30PM  Monday - Friday 8/30/2024 7:30 AM  Subjective:   Admit Date: 8/27/2024  PCP: José Luis Izquierdo MD  Interval History:   On nc  Doing ok  BP elevated    Diet: ADULT DIET; Regular; 5 carb choices (75 gm/meal); No Added Salt (3-4 gm); 1500 ml      Data:   Scheduled Meds:   heparin (porcine)  5,000 Units IntraCATHeter Once in dialysis    metroNIDAZOLE  500 mg Oral 3 times per day    calcitRIOL  1 mcg Oral Daily    sevelamer  800 mg Oral TID WC    [Held by provider] amiodarone  200 mg Oral Daily    [Held by provider] amLODIPine  5 mg Oral Daily    atorvastatin  40 mg Oral Nightly    [Held by provider] clopidogrel  75 mg Oral Daily    hydrALAZINE  25 mg Oral 3 times per day    [Held by provider] isosorbide mononitrate  30 mg Oral Daily    insulin glargine  5 Units SubCUTAneous Nightly    insulin lispro  0-4 Units SubCUTAneous TID WC    insulin lispro  0-4 Units SubCUTAneous Nightly    sodium chloride flush  5-40 mL IntraVENous 2 times per day    epoetin paola-epbx  10,000 Units IntraVENous Once per day on Monday Wednesday Friday    cefepime  1,000 mg IntraVENous Q24H    heparin (porcine)  5,000 Units SubCUTAneous 3 times per day    vancomycin (VANCOCIN) intermittent dosing (placeholder)   Other RX Placeholder     Continuous Infusions:   dextrose      sodium chloride 50 mL (08/29/24 2158)     PRN Meds:calcium carbonate, HYDROmorphone, oxyCODONE-acetaminophen, glucose, dextrose bolus **OR** dextrose bolus, glucagon (rDNA), dextrose, sodium chloride flush, sodium chloride, ondansetron **OR** ondansetron, polyethylene glycol, acetaminophen **OR** acetaminophen  No intake/output data recorded.  No intake/output data recorded.  No intake or output data in the 24 hours ending 08/30/24 0730    CBC:   Recent Labs     08/28/24  1643 08/29/24  0526 08/30/24  0301   WBC 6.9 5.6 5.8    HGB 8.8* 8.1* 8.6*    188 186       BMP:    Recent Labs     08/28/24  1643 08/29/24  0526 08/30/24  0301    136 137   K 4.1 4.5 4.9   CL 94* 96* 94*   CO2 26 27 25   BUN 40* 54* 73*   CREATININE 5.2* 6.2* 7.7*   GLUCOSE 192* 139* 166*   CALCIUM 9.3 8.5 8.1*     Hepatic:   Recent Labs     08/27/24  1630   AST 22   ALT 19   BILITOT 0.6   ALKPHOS 71         Objective:   Vitals: BP (!) 178/92   Pulse 73   Temp 98.2 °F (36.8 °C) (Oral)   Resp 18   Ht 1.753 m (5' 9\")   Wt 115.7 kg (255 lb)   SpO2 98%   BMI 37.66 kg/m²   General appearance: alert and cooperative with exam, in no acute distress  HEENT: normocephalic, atraumatic,   Neck: supple, trachea midline  Lungs:  breathing comfortably on nc  Abdomen:non distended,   Extremities: wound vac on left foot  Neurologic: alert, oriented, follows commands, interactive    MEDICAL DECISION MAKING     -Left foot osteo; recurrent//MRI left foot showed on 8/29/24: Osteomyelitis of the cuboid, lateral cuneiform and proximal fourth metatarsal.///Tenosynovitis of tibialis posterior, flexor hallucis longus,, peroneus brevis and longus tendon sheaths with tendon remaining intact.//Moderate cellulitis//Draining abscess at the lateral midfoot adjacent to cuboid and fourth metatarsal///Amputation of fifth metatarsal and proximal and mid first metatarsal  -Hyponatremia  -Hypocalcemia   -ESRD on HD MWF  -Anemia of ESRD  -CKD-MBD     Suggest;  -HD planned today  -Phos binders and calcitriol to be continued  -Retacrit in HD for Anemia mgmt  -The fistula arm is only to be touched by the HD nurse please, no BP cuff, no IV ACcess in that arm  -BP is high so resume amlodipine     Thank you                         Electronically signed by Melody James DO on 8/30/2024 at 7:30 AM    [facility]  MD SAMANTHA SMITH DO  [address],  [address] [place], [place] [id number]  PHONE: [phone]  FAX: [phone]

## 2024-08-30 NOTE — CONSULTS
Wound culture collected, wound vac dressing returned to place. Drape intact and suction at 125 MMHG continuously.

## 2024-08-30 NOTE — PROGRESS NOTES
Patient Name: Yovanny Levine  Patient : 1975  MRN: [id number]     Acct: [id number]  Date of Admission: 2024  Room/Bed: 3030/3030-A  Code Status:  Full Code  Allergies:   Allergies   Allergen Reactions    Adhesive Tape Rash    Doxycycline Nausea And Vomiting    Reglan [Metoclopramide] Anxiety     Diagnosis:    Patient Active Problem List   Diagnosis    Hiatal hernia    Diabetes mellitus type 2, improved controlled    Diabetic neuropathy (HCC)    Panic attack    Anxiety associated with depression    Neck pain    DANIELA (obstructive sleep apnea)    Urinary frequency    Bilateral carpal tunnel syndrome- left worse than right    Hyperlipidemia with target LDL less than 100    Essential hypertension    Bronchitis    Osteomyelitis (HCC)    Abscess    Periumbilical hernia    Sepsis (HCC)    Cellulitis of left foot    Constipation    Intractable nausea and vomiting    Uncontrolled type 2 diabetes mellitus    Nausea and vomiting    Diabetic foot infection (HCC)    Acute renal failure (ARF) (HCC)    Cellulitis    Cellulitis of left arm    Cellulitis, scrotum    Acute epididymitis    Irene gangrene    Recurrent major depressive disorder, in full remission (HCC)    Generalized anxiety disorder    Morbid obesity with body mass index (BMI) of 40.0 to 44.9 in adult (HCC)    Necrotizing fasciitis (HCC)    Syncope    Right groin wound    Ulcer of right groin with fat layer exposed (HCC)    Diabetic ulcer of left midfoot associated with type 2 diabetes mellitus (HCC)    Nephrotic syndrome    Generalized edema    Stage 3b chronic kidney disease (HCC)    Diabetic nephropathy associated with type 2 diabetes mellitus (HCC)    Hypoalbuminemia    Chronic kidney disease, stage IV (severe) (HCC)    FH: CAD (coronary artery disease)    ASCVD (arteriosclerotic cardiovascular disease)    Other proteinuria    Chest pain    Surgical wound dehiscence    CARMEN (acute kidney injury) (HCC)    Anemia    Chest tightness    NSTEMI (non-ST  2008 ml -50 mmHg 120 90 700 lines secure Yes   08/30/24 1200 350 ml/min 1000 ml/hr 2244 ml -60 mmHg 120 90 700 RESTING WITH EYES CLOSED Yes   08/30/24 1215 350 ml/min 1000 ml/hr 2490 ml -60 mmHg 120 90 700 pt resting with eyes closed Yes   08/30/24 1230 350 ml/min 1000 ml/hr 2770 ml -60 mmHg 120 90 700 no s/s of distress Yes   08/30/24 1245 350 ml/min 1000 ml/hr 2974 ml -50 mmHg 140 90 700 RESTING WITH EYES CLOSED Yes   08/30/24 1300 350 ml/min 100 ml/hr 3240 ml -60 mmHg 160 90 700 pt alert, no s/s of distress Yes   08/30/24 1315 350 ml/min 1000 ml/hr 3511 ml -40 mmHg 170 90 700 HOSPITALIST AT BEDSIDE Yes   08/30/24 1330 350 ml/min 1000 ml/hr 3688 ml -50 mmHg 160 90 700 no needs Yes   08/30/24 1345 350 ml/min 1000 ml/hr 3954 ml -50 mmHg 160 40 700 RESTING WITH EYES CLOSED Yes   08/30/24 1349 350 ml/min 1000 ml/hr 4000 ml -50 mmHg 160 40 700 tx complete Yes     Vital Signs  Patient Vitals for the past 8 hrs:   BP Temp Pulse Resp SpO2   08/30/24 0800 (!) 177/88 98.2 °F (36.8 °C) 78 17 100 %   08/30/24 0939 (!) 184/106 -- 77 22 --   08/30/24 0946 (!) 169/108 -- 76 15 --   08/30/24 1000 (!) 189/106 -- 78 16 --   08/30/24 1015 (!) 191/105 -- 78 12 --   08/30/24 1030 (!) 191/115 -- 77 15 --   08/30/24 1045 (!) 186/105 -- 76 (!) 9 --   08/30/24 1100 (!) 190/108 -- 76 13 --   08/30/24 1115 (!) 190/106 -- 79 21 --   08/30/24 1130 (!) 186/101 -- 75 11 --   08/30/24 1145 (!) 186/108 -- 74 11 --   08/30/24 1200 (!) 189/102 -- 73 14 --   08/30/24 1215 (!) 180/103 -- 74 11 --   08/30/24 1230 (!) 174/100 -- 75 (!) 7 --   08/30/24 1245 (!) 177/111 -- 77 14 --   08/30/24 1300 (!) 182/112 -- 75 13 --   08/30/24 1315 (!) 185/109 -- 75 10 --   08/30/24 1330 (!) 184/107 -- 73 10 --   08/30/24 1345 (!) 176/92 -- 74 11 --   08/30/24 1349 (!) 173/96 -- 74 10 --   08/30/24 1400 (!) 163/88 98.2 °F (36.8 °C) 75 11 --     Post-Dialysis  Arterial Catheter Locking Solution: Not Applicable  Venous Catheter Locking Solution: Not  Applicable  Post-Treatment Procedures: Blood returned, Access bleeding time > 10 minutes  Machine Disinfection Process: Acid/Vinegar Clean, Heat Disinfect, Exterior Machine Disinfection  Rinseback Volume (ml): 250 ml  Blood Volume Processed (Liters): 79.6 L  Dialyzer Clearance: Lightly streaked  Duration of Treatment (minutes): 240 minutes     Hemodialysis Intake (ml): 500 ml  Hemodialysis Output (ml): 4000 ml     Tolerated Treatment: Good  Patient Response to Treatment: stable  Physician Notified: No       Provider Notification        Handoff complete and report given to Primary RN at 1406 hours.  Primary RN (First Initial, Last Name, Title):  ELINA Baxter RN     Education  Person Educated: Patient   Knowledge Base: Substantial  Barriers to Learning?: None  Preferred method of Learning: Oral  Topic(s): Access Care, Signs and Symptoms of Infection, Fluid Management, Albumin, Procedural, Medications, Treatment Options, Potassium, Diet, and Transplant   Teaching Tools: Explanation   Response to Education: Verbalized Understanding and Requires Follow-up     Electronically signed by Lola Marx RN on 8/30/2024 at 2:06 PM

## 2024-08-30 NOTE — PROGRESS NOTES
General Surgery Progress Note      Hospital Day: 4    Chief Complaint on Admission: Osteomyelitis      Subjective:     Yovanny Levine is a 49 y.o. male with   Osteomyelitis of the L foot. Patient reports L foot pain that is unchanged from his baseline. Denies fever/chills. Tolerating ADULT DIET; Regular; 5 carb choices (75 gm/meal); No Added Salt (3-4 gm); 1500 ml.    ROS:  Review of Systems   Constitutional:  Negative for chills and fever.   HENT:  Negative for ear pain, mouth sores, sore throat and tinnitus.    Eyes:  Negative for photophobia, redness and itching.   Respiratory:  Negative for apnea, choking and stridor.    Cardiovascular:  Negative for chest pain and palpitations.   Gastrointestinal:  Negative for anal bleeding, constipation and rectal pain.   Endocrine: Negative for polydipsia.   Genitourinary:  Negative for enuresis, flank pain and hematuria.   Musculoskeletal:  Positive for arthralgias and gait problem. Negative for back pain, joint swelling and myalgias.   Skin:  Positive for wound. Negative for color change and pallor.   Allergic/Immunologic: Negative for environmental allergies.   Neurological:  Negative for syncope and speech difficulty.   Psychiatric/Behavioral:  Negative for confusion and hallucinations.        Allergies  Adhesive tape, Doxycycline, and Reglan [metoclopramide]          Diagnosis Date    Abscess 2010    scrotal    Acute renal failure (ARF) ([facility]) 08/04/2019    Anxiety associated with depression     Anxiety associated with depression     Back pain 07/02/2012    CAD (coronary artery disease) 02/10/2023    Chest pain 05/01/2013    Diabetic nephropathy ([facility])     Diabetic neuropathy ([facility]) 12/22/2011    Diabetic ulcer of left midfoot associated with type 2 diabetes mellitus, with fat layer exposed ([facility]) 07/18/2017    Diverticulosis     C scope + Dr. Medina    DM (diabetes mellitus), type 2 ([facility]) 2002    DR. 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     H/O percutaneous left  associated with diabetes mellitus due to underlying condition, with fat layer exposed (HCC)     Open wound of foot, left, sequela     Chronic foot pain, left     Absence of toe of left foot (HCC)     Hyperkalemia     ESRD (end stage renal disease) on dialysis (HCC)     Presence of surgical incision     Diabetic ulcer of toe of right foot associated with type 2 diabetes mellitus, with necrosis of muscle (HCC)     Diabetic ulcer of midfoot associated with type 2 diabetes mellitus, with muscle involvement without evidence of necrosis (HCC)     Acute pain of left knee     Pain in left lower leg      Plan:       Continue wound care of L foot. Patient is requesting foot salvage if possible. Discussed long term ABX and HBO, which pt is agreeable to.     Dr Cha will contact the wound center to try to get HBO set up. ID on board for abx recommendations.     Will follow peripherally.     Claribel Napier PA-C

## 2024-08-30 NOTE — CONSULTS
[facility]ound Ostomy Continence Nurse  Consult Note       Yovanny Levine  AGE: 49 y.o.   GENDER: male  : 1975  TODAY'S DATE:  2024    Subjective:     Reason for Evaluation and Assessment: wound vac change       Yovanny Levine is a 49 y.o. male referred by:   [] Physician  [] Nursing  [] Other:     Wound Identification:  Wound Type: non-healing surgical  Contributing Factors: diabetes, chronic pressure, shear force, obesity, and decreased tissue oxygenation        PAST MEDICAL HISTORY        Diagnosis Date    Abscess 2010    scrotal    Acute renal failure (ARF) ([facility]) 2019    Anxiety associated with depression     Anxiety associated with depression     Back pain 2012    CAD (coronary artery disease) 02/10/2023    Chest pain 2013    Diabetic nephropathy ([facility])     Diabetic neuropathy ([facility]) 2011    Diabetic ulcer of left midfoot associated with type 2 diabetes mellitus, with fat layer exposed ([facility]) 2017    Diverticulosis     C scope + Dr. Medina    DM (diabetes mellitus), type 2 ([facility])     DR. 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 Medina    Nausea and vomiting 2019    Necrotizing fasciitis ([facility])     Nose fracture 1988    Panic attacks     Pericarditis     Hospitalized with s/p heart cath normal    Peripheral autonomic neuropathy due to diabetes mellitus ([facility])     Axonal EMG- [facility], 2011    Sebaceous cyst 2011    URI (upper respiratory infection) 2012    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 type 2 diabetes mellitus, with muscle involvement without evidence of necrosis ([facility]) 2021    WD-Wound, surgical, infected, initial encounter 2017    Wrist fracture 1986       PAST SURGICAL HISTORY    Past Surgical  (Active)   Number of days: 2295       Wound 09/17/18 Left dorsal foot and 4th toe amp site 9/19/18 (Active)   Number of days: 2173       Incision 05/22/15 Foot Right (Active)   Number of days: 3387       Incision 12/04/17 Abdomen Mid (Active)   Number of days: 2460       Wound 01/11/19 left plantar foot wound (Active)   Number of days: 2058       Wound 03/21/19 Toe (Comment  which one) Anterior;Right (Active)   Number of days: 1989       Wound 03/21/19 Foot Left;Posterior hard callus area (Active)   Number of days: 1989       Wound 03/24/21 Left;Plantar (Active)   Number of days: 1255       Wound 05/13/21 Left;Plantar (Active)   Number of days: 1204       Wound 05/17/21 Groin Right scrotum extends to lower buttock (Active)   Number of days: 1201       Wound 09/01/22 #1 Left Plantar (Active)   Number of days: 729       Wound 02/20/24 Wound #1 Left Lateral Distal Foot (Active)   Wound Image   08/28/24 0830   Wound Etiology Non-Healing Surgical 08/30/24 1445   Dressing Status New dressing applied 08/30/24 1445   Wound Cleansed Cleansed with saline 08/30/24 1445   Dressing/Treatment Negative pressure wound therapy 08/30/24 1445   Offloading for Diabetic Foot Ulcers Post op shoe 08/30/24 1445   Wound Length (cm) 7.8 cm 08/28/24 0830   Wound Width (cm) 1 cm 08/28/24 0830   Wound Depth (cm) 1.8 cm 08/28/24 0830   Wound Surface Area (cm^2) 7.8 cm^2 08/28/24 0830   Change in Wound Size % (l*w) 59.69 08/28/24 0830   Wound Volume (cm^3) 14.04 cm^3 08/28/24 0830   Wound Healing % 9 08/28/24 0830   Post-Procedure Length (cm) 8 cm 08/27/24 1335   Post-Procedure Width (cm) 1.4 cm 08/27/24 1335   Post-Procedure Depth (cm) 2 cm 08/27/24 1335   Post-Procedure Surface Area (cm^2) 11.2 cm^2 08/27/24 1335   Post-Procedure Volume (cm^3) 22.4 cm^3 08/27/24 1335   Distance Tunneling (cm) 0 cm 08/28/24 0830   Tunneling Position ___ O'Clock 0 08/28/24 0830   Undermining Starts ___ O'Clock 0 08/28/24 0830   Undermining Ends___ O'Clock 0

## 2024-08-30 NOTE — PROGRESS NOTES
PHARMACY VANCOMYCIN MONITORING SERVICE  Pharmacy consulted by Earnest Shafer  for monitoring and adjustment.    Indication for treatment: Vancomycin indication: Bone/Joint infection   Goal trough: pre-Hd - 21-24  AUC/PAMELA: 400-600    Risk Factors for MRSA Identified:   Hospitalization within the past 90 days, Received IV antibiotics within the past 90 days, Patient on hemodialysis    Pertinent Laboratory Values:   Temp Readings from Last 3 Encounters:   08/30/24 97.7 °F (36.5 °C) (Oral)   08/27/24 98.5 °F (36.9 °C) (Temporal)   08/13/24 98 °F (36.7 °C) (Temporal)     Recent Labs     08/28/24  1643 08/29/24  0526 08/30/24  0301   WBC 6.9 5.6 5.8     Recent Labs     08/28/24  1643 08/29/24  0526 08/30/24  0301   BUN 40* 54* 73*   CREATININE 5.2* 6.2* 7.7*     Estimated Creatinine Clearance: 15 mL/min (A) (based on SCr of 7.7 mg/dL (H)).    Intake/Output Summary (Last 24 hours) at 8/30/2024 1535  Last data filed at 8/30/2024 1400  Gross per 24 hour   Intake 4500 ml   Output 4000 ml   Net 500 ml     Last Encounter Weight:  Wt Readings from Last 3 Encounters:   08/30/24 115.7 kg (255 lb)   06/12/24 125.5 kg (276 lb 10.8 oz)   05/22/24 117.9 kg (260 lb)       Pertinent Cultures:   Date    Source    Results  8/27   Blood    NG at 24 hours  8/27   urine                          No Growth           Vancomycin level:   TROUGH:    Recent Labs     08/30/24  0301   VANCOTROUGH 17.0     RANDOM:    Recent Labs     08/28/24  0541   VANCORANDOM 19.8       Assessment:  HPI: Intractable left foot pain with left foot x-ray showing signs of lateral midfoot ulceration concerning for possible osteomyelitis, Diabetes mellitus, ESRD on hemodialysis, Chronic pain disorder   SCr, BUN, and urine output:   ESRD on HD MWF, pt did receive Hd today  Day(s) of therapy; 4  Vancomycin concentration:  08/28: 19.8 mg/L (pre-HD level after loading dose)  8/30 - 17, pre-HD level, ok to re-dose    Plan:  iHD scheduled every MWF  Will dose intermittently

## 2024-08-30 NOTE — CARE COORDINATION
CM in to see Pt to follow up on discharge planning.  Plan remains home with [facility].      Pt has home woundvac in his room    Final ID recommendations pending.      Pt denies any needs. CM following

## 2024-08-31 LAB
CULTURE: ABNORMAL
CULTURE: ABNORMAL
GLUCOSE BLD-MCNC: 128 MG/DL (ref 70–99)
GLUCOSE BLD-MCNC: 169 MG/DL (ref 70–99)
GLUCOSE BLD-MCNC: 171 MG/DL (ref 70–99)
GLUCOSE BLD-MCNC: 213 MG/DL (ref 70–99)
Lab: ABNORMAL
SPECIMEN: ABNORMAL

## 2024-08-31 PROCEDURE — 6360000002 HC RX W HCPCS: Performed by: NURSE PRACTITIONER

## 2024-08-31 PROCEDURE — 2580000003 HC RX 258

## 2024-08-31 PROCEDURE — 6360000002 HC RX W HCPCS

## 2024-08-31 PROCEDURE — 1200000000 HC SEMI PRIVATE

## 2024-08-31 PROCEDURE — 6370000000 HC RX 637 (ALT 250 FOR IP): Performed by: STUDENT IN AN ORGANIZED HEALTH CARE EDUCATION/TRAINING PROGRAM

## 2024-08-31 PROCEDURE — 94761 N-INVAS EAR/PLS OXIMETRY MLT: CPT

## 2024-08-31 PROCEDURE — 82962 GLUCOSE BLOOD TEST: CPT

## 2024-08-31 PROCEDURE — 6370000000 HC RX 637 (ALT 250 FOR IP)

## 2024-08-31 PROCEDURE — 6370000000 HC RX 637 (ALT 250 FOR IP): Performed by: NURSE PRACTITIONER

## 2024-08-31 PROCEDURE — 6370000000 HC RX 637 (ALT 250 FOR IP): Performed by: INTERNAL MEDICINE

## 2024-08-31 PROCEDURE — 2580000003 HC RX 258: Performed by: STUDENT IN AN ORGANIZED HEALTH CARE EDUCATION/TRAINING PROGRAM

## 2024-08-31 PROCEDURE — 2500000003 HC RX 250 WO HCPCS: Performed by: STUDENT IN AN ORGANIZED HEALTH CARE EDUCATION/TRAINING PROGRAM

## 2024-08-31 PROCEDURE — 2700000000 HC OXYGEN THERAPY PER DAY

## 2024-08-31 RX ORDER — CALCIUM CARBONATE 500 MG/1
1000 TABLET, CHEWABLE ORAL
Status: DISCONTINUED | OUTPATIENT
Start: 2024-08-31 | End: 2024-09-03 | Stop reason: HOSPADM

## 2024-08-31 RX ORDER — PANTOPRAZOLE SODIUM 40 MG/10ML
40 INJECTION, POWDER, LYOPHILIZED, FOR SOLUTION INTRAVENOUS 2 TIMES DAILY
Status: DISCONTINUED | OUTPATIENT
Start: 2024-08-31 | End: 2024-09-01

## 2024-08-31 RX ORDER — LABETALOL HYDROCHLORIDE 5 MG/ML
10 INJECTION, SOLUTION INTRAVENOUS ONCE
Status: COMPLETED | OUTPATIENT
Start: 2024-08-31 | End: 2024-08-31

## 2024-08-31 RX ADMIN — CEFEPIME 1000 MG: 1 INJECTION, POWDER, FOR SOLUTION INTRAMUSCULAR; INTRAVENOUS at 23:10

## 2024-08-31 RX ADMIN — SEVELAMER CARBONATE 800 MG: 800 TABLET, FILM COATED ORAL at 10:21

## 2024-08-31 RX ADMIN — INSULIN GLARGINE 5 UNITS: 100 INJECTION, SOLUTION SUBCUTANEOUS at 22:50

## 2024-08-31 RX ADMIN — PANTOPRAZOLE SODIUM 40 MG: 40 INJECTION, POWDER, FOR SOLUTION INTRAVENOUS at 10:16

## 2024-08-31 RX ADMIN — METRONIDAZOLE 500 MG: 250 TABLET ORAL at 22:49

## 2024-08-31 RX ADMIN — OXYCODONE AND ACETAMINOPHEN 1 TABLET: 7.5; 325 TABLET ORAL at 17:39

## 2024-08-31 RX ADMIN — HYDROMORPHONE HYDROCHLORIDE 0.5 MG: 1 INJECTION, SOLUTION INTRAMUSCULAR; INTRAVENOUS; SUBCUTANEOUS at 23:04

## 2024-08-31 RX ADMIN — HYDRALAZINE HYDROCHLORIDE 25 MG: 25 TABLET ORAL at 05:40

## 2024-08-31 RX ADMIN — CALCITRIOL CAPSULES 0.25 MCG 1 MCG: 0.25 CAPSULE ORAL at 10:13

## 2024-08-31 RX ADMIN — CALCIUM CARBONATE 1000 MG: 500 TABLET, CHEWABLE ORAL at 12:34

## 2024-08-31 RX ADMIN — CALCIUM CARBONATE 1000 MG: 500 TABLET, CHEWABLE ORAL at 10:21

## 2024-08-31 RX ADMIN — LABETALOL HYDROCHLORIDE 10 MG: 5 INJECTION, SOLUTION INTRAVENOUS at 03:33

## 2024-08-31 RX ADMIN — OXYCODONE AND ACETAMINOPHEN 1 TABLET: 7.5; 325 TABLET ORAL at 10:13

## 2024-08-31 RX ADMIN — ATORVASTATIN CALCIUM 40 MG: 40 TABLET, FILM COATED ORAL at 22:49

## 2024-08-31 RX ADMIN — SEVELAMER CARBONATE 800 MG: 800 TABLET, FILM COATED ORAL at 12:27

## 2024-08-31 RX ADMIN — SODIUM CHLORIDE, PRESERVATIVE FREE 10 ML: 5 INJECTION INTRAVENOUS at 22:50

## 2024-08-31 RX ADMIN — HEPARIN SODIUM 5000 UNITS: 5000 INJECTION INTRAVENOUS; SUBCUTANEOUS at 05:39

## 2024-08-31 RX ADMIN — METRONIDAZOLE 500 MG: 250 TABLET ORAL at 05:40

## 2024-08-31 RX ADMIN — PANTOPRAZOLE SODIUM 40 MG: 40 INJECTION, POWDER, FOR SOLUTION INTRAVENOUS at 00:18

## 2024-08-31 RX ADMIN — PANTOPRAZOLE SODIUM 40 MG: 40 INJECTION, POWDER, FOR SOLUTION INTRAVENOUS at 22:50

## 2024-08-31 RX ADMIN — HYDRALAZINE HYDROCHLORIDE 25 MG: 25 TABLET ORAL at 12:24

## 2024-08-31 RX ADMIN — HEPARIN SODIUM 5000 UNITS: 5000 INJECTION INTRAVENOUS; SUBCUTANEOUS at 12:24

## 2024-08-31 RX ADMIN — HYDRALAZINE HYDROCHLORIDE 25 MG: 25 TABLET ORAL at 22:49

## 2024-08-31 RX ADMIN — SODIUM CHLORIDE, PRESERVATIVE FREE 10 ML: 5 INJECTION INTRAVENOUS at 10:18

## 2024-08-31 RX ADMIN — POLYETHYLENE GLYCOL (3350) 17 G: 17 POWDER, FOR SOLUTION ORAL at 11:02

## 2024-08-31 RX ADMIN — INSULIN LISPRO 1 UNITS: 100 INJECTION, SOLUTION INTRAVENOUS; SUBCUTANEOUS at 17:40

## 2024-08-31 RX ADMIN — HEPARIN SODIUM 5000 UNITS: 5000 INJECTION INTRAVENOUS; SUBCUTANEOUS at 22:49

## 2024-08-31 RX ADMIN — CALCIUM CARBONATE 500 MG: 500 TABLET, CHEWABLE ORAL at 02:47

## 2024-08-31 RX ADMIN — ISOSORBIDE MONONITRATE 30 MG: 30 TABLET, EXTENDED RELEASE ORAL at 10:13

## 2024-08-31 RX ADMIN — AMLODIPINE BESYLATE 5 MG: 5 TABLET ORAL at 10:13

## 2024-08-31 RX ADMIN — SEVELAMER CARBONATE 800 MG: 800 TABLET, FILM COATED ORAL at 17:39

## 2024-08-31 RX ADMIN — METRONIDAZOLE 500 MG: 250 TABLET ORAL at 12:24

## 2024-08-31 RX ADMIN — CALCIUM CARBONATE 1000 MG: 500 TABLET, CHEWABLE ORAL at 17:40

## 2024-08-31 ASSESSMENT — PAIN DESCRIPTION - PAIN TYPE: TYPE: ACUTE PAIN;CHRONIC PAIN;SURGICAL PAIN

## 2024-08-31 ASSESSMENT — PAIN SCALES - GENERAL
PAINLEVEL_OUTOF10: 8
PAINLEVEL_OUTOF10: 5
PAINLEVEL_OUTOF10: 7
PAINLEVEL_OUTOF10: 5
PAINLEVEL_OUTOF10: 7

## 2024-08-31 ASSESSMENT — PAIN DESCRIPTION - DESCRIPTORS: DESCRIPTORS: BURNING;SHARP

## 2024-08-31 ASSESSMENT — PAIN DESCRIPTION - LOCATION
LOCATION: LEG;FOOT
LOCATION: FOOT;LEG

## 2024-08-31 ASSESSMENT — PAIN SCALES - WONG BAKER
WONGBAKER_NUMERICALRESPONSE: NO HURT
WONGBAKER_NUMERICALRESPONSE: NO HURT

## 2024-08-31 ASSESSMENT — PAIN DESCRIPTION - ORIENTATION
ORIENTATION: LEFT
ORIENTATION: LEFT

## 2024-08-31 ASSESSMENT — PAIN DESCRIPTION - ONSET: ONSET: PROGRESSIVE

## 2024-08-31 ASSESSMENT — PAIN DESCRIPTION - FREQUENCY: FREQUENCY: CONTINUOUS

## 2024-08-31 NOTE — PLAN OF CARE
Problem: Discharge Planning  Goal: Discharge to home or other facility with appropriate resources  8/31/2024 1113 by Caron Olvera RN  Outcome: Progressing  Flowsheets (Taken 8/31/2024 1105)  Discharge to home or other facility with appropriate resources: Identify barriers to discharge with patient and caregiver  8/31/2024 0036 by Brooke Estrella RN  Outcome: Progressing     Problem: Pain  Goal: Verbalizes/displays adequate comfort level or baseline comfort level  8/31/2024 1113 by Caron Olvera RN  Outcome: Progressing  8/31/2024 0036 by Brooke Estrella RN  Outcome: Progressing     Problem: Safety - Adult  Goal: Free from fall injury  8/31/2024 1113 by Caron Olvera RN  Outcome: Progressing  8/31/2024 0036 by Brooke Estrella RN  Outcome: Progressing     Problem: Chronic Conditions and Co-morbidities  Goal: Patient's chronic conditions and co-morbidity symptoms are monitored and maintained or improved  8/31/2024 1113 by Caron Olvera RN  Outcome: Progressing  Flowsheets (Taken 8/31/2024 1105)  Care Plan - Patient's Chronic Conditions and Co-Morbidity Symptoms are Monitored and Maintained or Improved: Monitor and assess patient's chronic conditions and comorbid symptoms for stability, deterioration, or improvement  8/31/2024 0036 by Brooke Estrella RN  Outcome: Progressing     Problem: ABCDS Injury Assessment  Goal: Absence of physical injury  8/31/2024 1113 by Caron Olvera RN  Outcome: Progressing  8/31/2024 0036 by Brooke Estrella RN  Outcome: Progressing     Problem: Skin/Tissue Integrity - Adult  Goal: Incisions, wounds, or drain sites healing without S/S of infection  8/31/2024 1113 by Caron Olvera RN  Outcome: Progressing  Flowsheets (Taken 8/31/2024 1105)  Incisions, Wounds, or Drain Sites Healing Without Sign and Symptoms of Infection: ADMISSION and DAILY: Assess and document risk factors for pressure ulcer development  8/31/2024 0036 by Brooke Estrella RN  Outcome: Progressing     Problem:  Gastrointestinal - Adult  Goal: Minimal or absence of nausea and vomiting  8/31/2024 1113 by Caron Olvera RN  Outcome: Progressing  Flowsheets (Taken 8/31/2024 1105)  Minimal or absence of nausea and vomiting: Administer IV fluids as ordered to ensure adequate hydration  8/31/2024 0036 by Brooke Estrella RN  Outcome: Progressing     Problem: Infection - Adult  Goal: Absence of infection at discharge  8/31/2024 1113 by Caron Olvera RN  Outcome: Progressing  Flowsheets (Taken 8/31/2024 1105)  Absence of infection at discharge: Assess and monitor for signs and symptoms of infection  8/31/2024 0036 by Brooke Estrella RN  Outcome: Progressing  Goal: Absence of infection during hospitalization  8/31/2024 1113 by Caron Olvera RN  Outcome: Progressing  Flowsheets (Taken 8/31/2024 1105)  Absence of infection during hospitalization: Assess and monitor for signs and symptoms of infection  8/31/2024 0036 by Brooke Estrella RN  Outcome: Progressing     Problem: Metabolic/Fluid and Electrolytes - Adult  Goal: Electrolytes maintained within normal limits  8/31/2024 1113 by Caron Olvera RN  Outcome: Progressing  Flowsheets (Taken 8/31/2024 1105)  Electrolytes maintained within normal limits: Monitor labs and assess patient for signs and symptoms of electrolyte imbalances  8/31/2024 0036 by Brooke Estrella RN  Outcome: Progressing  Goal: Hemodynamic stability and optimal renal function maintained  8/31/2024 1113 by Caron Olvera RN  Outcome: Progressing  Flowsheets (Taken 8/31/2024 1105)  Hemodynamic stability and optimal renal function maintained: Monitor labs and assess for signs and symptoms of volume excess or deficit  8/31/2024 0036 by Brooke Estrella RN  Outcome: Progressing  Goal: Glucose maintained within prescribed range  8/31/2024 1113 by Caron Olvera RN  Outcome: Progressing  Flowsheets (Taken 8/31/2024 1105)  Glucose maintained within prescribed range: Monitor blood glucose as ordered  8/31/2024 0036 by Sameer  ROBIN Cox  Outcome: Progressing

## 2024-08-31 NOTE — CONSULTS
PHARMACY VANCOMYCIN MONITORING SERVICE  Pharmacy consulted by Earnest Shafer  for monitoring and adjustment.    Indication for treatment: Vancomycin indication: Bone/Joint infection   Goal trough: pre-Hd - 21-24  AUC/PAMELA: 400-600    Risk Factors for MRSA Identified:   Hospitalization within the past 90 days, Received IV antibiotics within the past 90 days, Patient on hemodialysis    Pertinent Laboratory Values:   Temp Readings from Last 3 Encounters:   08/31/24 98.2 °F (36.8 °C) (Oral)   08/27/24 98.5 °F (36.9 °C) (Temporal)   08/13/24 98 °F (36.7 °C) (Temporal)     Recent Labs     08/28/24  1643 08/29/24  0526 08/30/24  0301   WBC 6.9 5.6 5.8     Recent Labs     08/28/24  1643 08/29/24  0526 08/30/24  0301   BUN 40* 54* 73*   CREATININE 5.2* 6.2* 7.7*     Estimated Creatinine Clearance: 15 mL/min (A) (based on SCr of 7.7 mg/dL (H)).    Intake/Output Summary (Last 24 hours) at 8/31/2024 1334  Last data filed at 8/31/2024 1102  Gross per 24 hour   Intake 4740 ml   Output 4350 ml   Net 390 ml     Last Encounter Weight:  Wt Readings from Last 3 Encounters:   08/30/24 115.7 kg (255 lb)   06/12/24 125.5 kg (276 lb 10.8 oz)   05/22/24 117.9 kg (260 lb)       Pertinent Cultures:   Date    Source    Results  8/27   Blood    1/4 MRSA +  8/27   Urine                         No Growth  8/30                            Wound                                    Pending  8/30                            Blood                                    Sent           Vancomycin level:   TROUGH:    Recent Labs     08/30/24  0301   VANCOTROUGH 17.0       Assessment:  HPI: Intractable left foot pain with left foot x-ray showing signs of lateral midfoot ulceration concerning for possible osteomyelitis, Diabetes mellitus, ESRD on hemodialysis, Chronic pain disorder. Now blood culture in 1/4 positive for MRSA.  SCr, BUN, and urine output: ESRD on hemodialysis  Day(s) of therapy: 5  Vancomycin concentration:   8/28: 19.8 mg/L (pre-HD  level)  8/30: 17 mg/L (pre-HD level)    Plan:  iHD scheduled every MWF  No vancomycin today, next level ordered for Monday prior to next HD session  Pharmacy will continue to monitor patient and adjust therapy as indicated    VANCOMYCIN CONCENTRATION SCHEDULED FOR 09/02 @ 0600    Thank you for the consult.  Kinsey Crump [facility], PharmD.  8/31/2024 1:38 PM

## 2024-08-31 NOTE — PROGRESS NOTES
Nephrology Progress Note        [address] [facility], [address]  [place] [address]  Phone: [phone]  Office Hours: 8:30AM - 4:30PM  Monday - Friday 8/31/2024 8:12 AM  Subjective:   Admit Date: 8/27/2024  PCP: José Luis Izquierdo MD  Interval History:   Reports acid reflux last night  No dyspnea  Had HD yesterday  +constipation    Diet: ADULT DIET; Regular; 5 carb choices (75 gm/meal); No Added Salt (3-4 gm); Low Potassium (Less than 3000 mg/day); 1500 ml      Data:   Scheduled Meds:   pantoprazole  40 mg IntraVENous BID    metroNIDAZOLE  500 mg Oral 3 times per day    calcitRIOL  1 mcg Oral Daily    sevelamer  800 mg Oral TID WC    [Held by provider] amiodarone  200 mg Oral Daily    amLODIPine  5 mg Oral Daily    atorvastatin  40 mg Oral Nightly    [Held by provider] clopidogrel  75 mg Oral Daily    hydrALAZINE  25 mg Oral 3 times per day    isosorbide mononitrate  30 mg Oral Daily    insulin glargine  5 Units SubCUTAneous Nightly    insulin lispro  0-4 Units SubCUTAneous TID WC    insulin lispro  0-4 Units SubCUTAneous Nightly    sodium chloride flush  5-40 mL IntraVENous 2 times per day    epoetin paola-epbx  10,000 Units IntraVENous Once per day on Monday Wednesday Friday    cefepime  1,000 mg IntraVENous Q24H    heparin (porcine)  5,000 Units SubCUTAneous 3 times per day    vancomycin (VANCOCIN) intermittent dosing (placeholder)   Other RX Placeholder     Continuous Infusions:   dextrose      sodium chloride 25 mL/hr at 08/30/24 1656     PRN Meds:calcium carbonate, HYDROmorphone, oxyCODONE-acetaminophen, glucose, dextrose bolus **OR** dextrose bolus, glucagon (rDNA), dextrose, sodium chloride flush, sodium chloride, ondansetron **OR** ondansetron, polyethylene glycol, acetaminophen **OR** acetaminophen  I/O last 3 completed shifts:  In: 4500 [I.V.:4000]  Out: 4350 [Emesis/NG output:350]  No intake/output data recorded.    Intake/Output Summary (Last 24 hours) at 8/31/2024 0812  Last data filed  at 8/30/2024 2256  Gross per 24 hour   Intake 4500 ml   Output 4350 ml   Net 150 ml       CBC:   Recent Labs     08/28/24  1643 08/29/24  0526 08/30/24  0301   WBC 6.9 5.6 5.8   HGB 8.8* 8.1* 8.6*    188 186       BMP:    Recent Labs     08/28/24  1643 08/29/24  0526 08/30/24  0301    136 137   K 4.1 4.5 4.9   CL 94* 96* 94*   CO2 26 27 25   BUN 40* 54* 73*   CREATININE 5.2* 6.2* 7.7*   GLUCOSE 192* 139* 166*   CALCIUM 9.3 8.5 8.1*     Hepatic: No results for input(s): \"AST\", \"ALT\", \"BILITOT\", \"ALKPHOS\" in the last 72 hours.    Invalid input(s): \"ALB\"  Troponin: No results for input(s): \"TROPONINI\" in the last 72 hours.  BNP: No results for input(s): \"BNP\" in the last 72 hours.  Lipids: No results for input(s): \"CHOL\", \"HDL\" in the last 72 hours.    Invalid input(s): \"LDLCALCU\"  ABGs:   Lab Results   Component Value Date/Time    PO2ART 119 06/18/2013 01:15 PM    LUV0WOC 47.0 06/18/2013 01:15 PM     INR: No results for input(s): \"INR\" in the last 72 hours.    Objective:   Vitals: BP (!) 162/93   Pulse 82   Temp 97.7 °F (36.5 °C) (Oral)   Resp 12   Ht 1.753 m (5' 9\")   Wt 115.7 kg (255 lb)   SpO2 98%   BMI 37.66 kg/m²   General appearance: alert and cooperative with exam, in no acute distress  HEENT: normocephalic, atraumatic,   Neck: supple, trachea midline  Lungs: breathing comfortably on nc  Extremities: trace LLE edema  Neurologic: alert, oriented, follows commands, interactive    MEDICAL DECISION MAKING   Left foot osteo; recurrent//MRI left foot showed on 8/29/24: Osteomyelitis of the cuboid, lateral cuneiform and proximal fourth metatarsal.///Tenosynovitis of tibialis posterior, flexor hallucis longus,, peroneus brevis and longus tendon sheaths with tendon remaining intact.//Moderate cellulitis//Draining abscess at the lateral midfoot adjacent to cuboid and fourth metatarsal///Amputation of fifth metatarsal and proximal and mid first metatarsal  -Hyponatremia  -Hypocalcemia   -ESRD on HD

## 2024-08-31 NOTE — PROGRESS NOTES
V2.0  [facility] Hospitalist Progress Note      Name:  Yovanny Levine /Age/Sex: 1975  (49 y.o. male)   MRN & CSN:  [id number] & [id number] Encounter Date/Time: 2024 2:48 PM EDT    Location:  [address] [place] [address]- PCP: José Luis Izquierdo MD       Hospital Day: 5      Subjective:     Chief Complaint:  Leg Pain (States leg pain since yesterday)     Patient seen and evaluated at bedside.  No acute events overnight.  Patient seen sitting on the side of the bed eating breakfast.  Denies any active complaints.  States he feels better today.  Communicating appropriately.  Plan of care discussed at length.  All questions answered.    Assessment and Plan:   Left foot cellulitis, w osteomyelitis of cuboid, lateral cunfeiform and proximal fourth metatarsal. Came w worsening l foot pain. Left foot x-ray showing signs of lateral midfoot ulceration concerning for possible osteomyelitis.  MRI confirmed diagnosis  - ID consulted  - now on vanc+cefepime+flagyl  - f/u blood cxX2; now growing MRSA   - pain control  - general surgery consulted    MRSA bacteremia  Blood cx 2024  MRSA  - on vancomycin as above  - repeat blood cxX2 NGTD  - ECHO limited ordered, EF 50%, no evidence of endocarditis  - ID following, cultures every 48h.     ESRD on hemodialysis   - nephrology has been consulted  - HD as per nephrology    History of chronic left-sided foot osteomyelitis   Has wound VAC in place since last admission.     - plan as above  - consulted general surgery as above  - wound care consulted    Coronary artery disease with history of PCI and stenting to LAD and LCx continue Plavix statin and Coreg    Diabetes mellitus insulin-dependent   Continue the patient on Lantus of 5 units and sliding scale insulin monitor for hypoglycemia      Hyperlipidemia on statin    Chronic pain disorder on home Percocet, added Dilaudid for breakthrough pain for now    Essential hypertension on home amlodipine carvedilol and hydralazine along with  POC Glucose 189 (H) 70 - 99 MG/DL   POCT Glucose    Collection Time: 08/30/24  8:49 PM   Result Value Ref Range    POC Glucose 248 (H) 70 - 99 MG/DL   POCT Glucose    Collection Time: 08/31/24  6:42 AM   Result Value Ref Range    POC Glucose 171 (H) 70 - 99 MG/DL        Imaging/Diagnostics Last 24 Hours   Vascular duplex lower extremity venous left    Result Date: 8/27/2024  Left lower extremity venous ultrasound INDICATION:  Pain and swelling. Left leg pain COMPARISON:  None Doppler ultrasound of the left lower extremity was performed. FINDINGS:  There is normal flow in the great saphenous, common femoral, femoral, and popliteal veins. Normal compression and augmentation is demonstrated. The proximal calf veins are also patent. Additional notation made of mildly prominent nonpathologically enlarged left groin lymph nodes all of which demonstrate a short axis of less than 10 mm.     No evidence of deep venous thrombosis in the left lower extremity Electronically signed by Gonzales Simons MD    XR FOOT LEFT (MIN 3 VIEWS)    Result Date: 8/27/2024  Left Foot INDICATION: Trauma COMPARISON: X-ray foot June 2, 2024 TECHNIQUE: Three views of the left foot were obtained FINDINGS: Redemonstrated amputations from the proximal first metatarsal, at the fourth MTP joint, and the entire fifth ray. Amputation of the second metatarsal head. Soft tissue swelling and ulceration over the lateral midfoot with erosions at the base of the fourth metatarsal and cuboid. No evidence of acute fracture or dislocation.     Redemonstrated amputations described above. Lateral midfoot ulceration with associated erosive changes at the base of the fourth metatarsal and cuboid. Concerning for possible osteomyelitis. Correlate clinically. Consider MRI for further assessment. Electronically signed by DANA BANERJEE      Electronically signed by Milan Ramirez MD on 8/31/2024 at 9:06 AM

## 2024-09-01 ENCOUNTER — APPOINTMENT (OUTPATIENT)
Dept: GENERAL RADIOLOGY | Age: 49
DRG: 982 | End: 2024-09-01
Payer: MEDICARE

## 2024-09-01 LAB
CULTURE: NORMAL
GLUCOSE BLD-MCNC: 143 MG/DL (ref 70–99)
GLUCOSE BLD-MCNC: 157 MG/DL (ref 70–99)
GLUCOSE BLD-MCNC: 183 MG/DL (ref 70–99)
GLUCOSE BLD-MCNC: 198 MG/DL (ref 70–99)
Lab: NORMAL
SPECIMEN: NORMAL

## 2024-09-01 PROCEDURE — 2700000000 HC OXYGEN THERAPY PER DAY

## 2024-09-01 PROCEDURE — 6370000000 HC RX 637 (ALT 250 FOR IP): Performed by: STUDENT IN AN ORGANIZED HEALTH CARE EDUCATION/TRAINING PROGRAM

## 2024-09-01 PROCEDURE — 6360000002 HC RX W HCPCS: Performed by: NURSE PRACTITIONER

## 2024-09-01 PROCEDURE — 6370000000 HC RX 637 (ALT 250 FOR IP): Performed by: INTERNAL MEDICINE

## 2024-09-01 PROCEDURE — 82962 GLUCOSE BLOOD TEST: CPT

## 2024-09-01 PROCEDURE — 2580000003 HC RX 258

## 2024-09-01 PROCEDURE — 1200000000 HC SEMI PRIVATE

## 2024-09-01 PROCEDURE — 6360000002 HC RX W HCPCS

## 2024-09-01 PROCEDURE — 6370000000 HC RX 637 (ALT 250 FOR IP)

## 2024-09-01 PROCEDURE — 74018 RADEX ABDOMEN 1 VIEW: CPT

## 2024-09-01 PROCEDURE — 6370000000 HC RX 637 (ALT 250 FOR IP): Performed by: HOSPITALIST

## 2024-09-01 PROCEDURE — 2500000003 HC RX 250 WO HCPCS: Performed by: STUDENT IN AN ORGANIZED HEALTH CARE EDUCATION/TRAINING PROGRAM

## 2024-09-01 PROCEDURE — 94761 N-INVAS EAR/PLS OXIMETRY MLT: CPT

## 2024-09-01 PROCEDURE — 2580000003 HC RX 258: Performed by: STUDENT IN AN ORGANIZED HEALTH CARE EDUCATION/TRAINING PROGRAM

## 2024-09-01 RX ORDER — GABAPENTIN 100 MG/1
100 CAPSULE ORAL 3 TIMES DAILY
Status: DISCONTINUED | OUTPATIENT
Start: 2024-09-01 | End: 2024-09-03 | Stop reason: HOSPADM

## 2024-09-01 RX ORDER — POLYETHYLENE GLYCOL 3350 17 G/17G
17 POWDER, FOR SOLUTION ORAL DAILY
Status: DISCONTINUED | OUTPATIENT
Start: 2024-09-02 | End: 2024-09-03 | Stop reason: HOSPADM

## 2024-09-01 RX ORDER — INSULIN LISPRO 100 [IU]/ML
0-4 INJECTION, SOLUTION INTRAVENOUS; SUBCUTANEOUS
Status: DISCONTINUED | OUTPATIENT
Start: 2024-09-02 | End: 2024-09-03 | Stop reason: HOSPADM

## 2024-09-01 RX ORDER — GLUCAGON 1 MG/ML
1 KIT INJECTION PRN
Status: DISCONTINUED | OUTPATIENT
Start: 2024-09-01 | End: 2024-09-03 | Stop reason: HOSPADM

## 2024-09-01 RX ORDER — OXYCODONE HYDROCHLORIDE 10 MG/1
10 TABLET ORAL EVERY 4 HOURS PRN
Status: DISCONTINUED | OUTPATIENT
Start: 2024-09-01 | End: 2024-09-03 | Stop reason: HOSPADM

## 2024-09-01 RX ORDER — ISOSORBIDE MONONITRATE 60 MG/1
60 TABLET, EXTENDED RELEASE ORAL DAILY
Status: DISCONTINUED | OUTPATIENT
Start: 2024-09-01 | End: 2024-09-03 | Stop reason: HOSPADM

## 2024-09-01 RX ORDER — INSULIN LISPRO 100 [IU]/ML
0.05 INJECTION, SOLUTION INTRAVENOUS; SUBCUTANEOUS
Status: DISCONTINUED | OUTPATIENT
Start: 2024-09-02 | End: 2024-09-03 | Stop reason: HOSPADM

## 2024-09-01 RX ORDER — DEXTROSE MONOHYDRATE 100 MG/ML
INJECTION, SOLUTION INTRAVENOUS CONTINUOUS PRN
Status: DISCONTINUED | OUTPATIENT
Start: 2024-09-01 | End: 2024-09-03 | Stop reason: HOSPADM

## 2024-09-01 RX ORDER — ACETAMINOPHEN 500 MG
1000 TABLET ORAL 3 TIMES DAILY
Status: DISCONTINUED | OUTPATIENT
Start: 2024-09-01 | End: 2024-09-03 | Stop reason: HOSPADM

## 2024-09-01 RX ORDER — INSULIN GLARGINE 100 [IU]/ML
24 INJECTION, SOLUTION SUBCUTANEOUS ONCE
Status: COMPLETED | OUTPATIENT
Start: 2024-09-01 | End: 2024-09-01

## 2024-09-01 RX ORDER — PANTOPRAZOLE SODIUM 40 MG/1
40 TABLET, DELAYED RELEASE ORAL
Status: DISCONTINUED | OUTPATIENT
Start: 2024-09-02 | End: 2024-09-03 | Stop reason: HOSPADM

## 2024-09-01 RX ORDER — OXYCODONE HYDROCHLORIDE 5 MG/1
5 TABLET ORAL EVERY 4 HOURS PRN
Status: DISCONTINUED | OUTPATIENT
Start: 2024-09-01 | End: 2024-09-03 | Stop reason: HOSPADM

## 2024-09-01 RX ORDER — INSULIN LISPRO 100 [IU]/ML
0-4 INJECTION, SOLUTION INTRAVENOUS; SUBCUTANEOUS NIGHTLY
Status: DISCONTINUED | OUTPATIENT
Start: 2024-09-01 | End: 2024-09-03 | Stop reason: HOSPADM

## 2024-09-01 RX ORDER — INSULIN GLARGINE 100 [IU]/ML
0.25 INJECTION, SOLUTION SUBCUTANEOUS NIGHTLY
Status: DISCONTINUED | OUTPATIENT
Start: 2024-09-02 | End: 2024-09-03 | Stop reason: HOSPADM

## 2024-09-01 RX ORDER — SENNOSIDES A AND B 8.6 MG/1
1 TABLET, FILM COATED ORAL NIGHTLY
Status: DISCONTINUED | OUTPATIENT
Start: 2024-09-01 | End: 2024-09-03 | Stop reason: HOSPADM

## 2024-09-01 RX ADMIN — CEFEPIME 1000 MG: 1 INJECTION, POWDER, FOR SOLUTION INTRAMUSCULAR; INTRAVENOUS at 20:38

## 2024-09-01 RX ADMIN — PANTOPRAZOLE SODIUM 40 MG: 40 INJECTION, POWDER, FOR SOLUTION INTRAVENOUS at 09:06

## 2024-09-01 RX ADMIN — SEVELAMER CARBONATE 800 MG: 800 TABLET, FILM COATED ORAL at 17:53

## 2024-09-01 RX ADMIN — METRONIDAZOLE 500 MG: 250 TABLET ORAL at 06:17

## 2024-09-01 RX ADMIN — SODIUM CHLORIDE, PRESERVATIVE FREE 10 ML: 5 INJECTION INTRAVENOUS at 09:07

## 2024-09-01 RX ADMIN — HYDRALAZINE HYDROCHLORIDE 25 MG: 25 TABLET ORAL at 20:32

## 2024-09-01 RX ADMIN — POLYETHYLENE GLYCOL (3350) 17 G: 17 POWDER, FOR SOLUTION ORAL at 06:29

## 2024-09-01 RX ADMIN — OXYCODONE AND ACETAMINOPHEN 1 TABLET: 7.5; 325 TABLET ORAL at 06:29

## 2024-09-01 RX ADMIN — SENNOSIDES 8.6 MG: 8.6 TABLET, FILM COATED ORAL at 23:30

## 2024-09-01 RX ADMIN — ISOSORBIDE MONONITRATE 60 MG: 60 TABLET, EXTENDED RELEASE ORAL at 09:04

## 2024-09-01 RX ADMIN — HYDROMORPHONE HYDROCHLORIDE 0.5 MG: 1 INJECTION, SOLUTION INTRAMUSCULAR; INTRAVENOUS; SUBCUTANEOUS at 11:24

## 2024-09-01 RX ADMIN — HYDRALAZINE HYDROCHLORIDE 25 MG: 25 TABLET ORAL at 15:08

## 2024-09-01 RX ADMIN — CALCITRIOL CAPSULES 0.25 MCG 1 MCG: 0.25 CAPSULE ORAL at 09:04

## 2024-09-01 RX ADMIN — HYDRALAZINE HYDROCHLORIDE 25 MG: 25 TABLET ORAL at 06:16

## 2024-09-01 RX ADMIN — HEPARIN SODIUM 5000 UNITS: 5000 INJECTION INTRAVENOUS; SUBCUTANEOUS at 15:08

## 2024-09-01 RX ADMIN — METRONIDAZOLE 500 MG: 250 TABLET ORAL at 15:08

## 2024-09-01 RX ADMIN — GABAPENTIN 100 MG: 100 CAPSULE ORAL at 23:31

## 2024-09-01 RX ADMIN — CALCIUM CARBONATE 1000 MG: 500 TABLET, CHEWABLE ORAL at 17:54

## 2024-09-01 RX ADMIN — INSULIN GLARGINE 5 UNITS: 100 INJECTION, SOLUTION SUBCUTANEOUS at 20:32

## 2024-09-01 RX ADMIN — OXYCODONE AND ACETAMINOPHEN 1 TABLET: 7.5; 325 TABLET ORAL at 17:54

## 2024-09-01 RX ADMIN — INSULIN GLARGINE 24 UNITS: 100 INJECTION, SOLUTION SUBCUTANEOUS at 23:31

## 2024-09-01 RX ADMIN — CALCIUM CARBONATE 1000 MG: 500 TABLET, CHEWABLE ORAL at 12:00

## 2024-09-01 RX ADMIN — ATORVASTATIN CALCIUM 40 MG: 40 TABLET, FILM COATED ORAL at 20:32

## 2024-09-01 RX ADMIN — CALCIUM CARBONATE 1000 MG: 500 TABLET, CHEWABLE ORAL at 09:05

## 2024-09-01 RX ADMIN — SEVELAMER CARBONATE 800 MG: 800 TABLET, FILM COATED ORAL at 12:00

## 2024-09-01 RX ADMIN — ACETAMINOPHEN 1000 MG: 500 TABLET ORAL at 23:31

## 2024-09-01 RX ADMIN — OXYCODONE HYDROCHLORIDE 10 MG: 10 TABLET ORAL at 23:30

## 2024-09-01 RX ADMIN — AMLODIPINE BESYLATE 5 MG: 5 TABLET ORAL at 09:04

## 2024-09-01 RX ADMIN — PANTOPRAZOLE SODIUM 40 MG: 40 INJECTION, POWDER, FOR SOLUTION INTRAVENOUS at 20:31

## 2024-09-01 RX ADMIN — SEVELAMER CARBONATE 800 MG: 800 TABLET, FILM COATED ORAL at 09:05

## 2024-09-01 RX ADMIN — METRONIDAZOLE 500 MG: 250 TABLET ORAL at 20:32

## 2024-09-01 RX ADMIN — HEPARIN SODIUM 5000 UNITS: 5000 INJECTION INTRAVENOUS; SUBCUTANEOUS at 06:16

## 2024-09-01 RX ADMIN — HEPARIN SODIUM 5000 UNITS: 5000 INJECTION INTRAVENOUS; SUBCUTANEOUS at 20:31

## 2024-09-01 RX ADMIN — SODIUM CHLORIDE, PRESERVATIVE FREE 10 ML: 5 INJECTION INTRAVENOUS at 20:31

## 2024-09-01 ASSESSMENT — PAIN - FUNCTIONAL ASSESSMENT
PAIN_FUNCTIONAL_ASSESSMENT: PREVENTS OR INTERFERES SOME ACTIVE ACTIVITIES AND ADLS
PAIN_FUNCTIONAL_ASSESSMENT: ACTIVITIES ARE NOT PREVENTED
PAIN_FUNCTIONAL_ASSESSMENT: PREVENTS OR INTERFERES SOME ACTIVE ACTIVITIES AND ADLS
PAIN_FUNCTIONAL_ASSESSMENT: ACTIVITIES ARE NOT PREVENTED
PAIN_FUNCTIONAL_ASSESSMENT: PREVENTS OR INTERFERES SOME ACTIVE ACTIVITIES AND ADLS

## 2024-09-01 ASSESSMENT — PAIN DESCRIPTION - ORIENTATION
ORIENTATION: LEFT

## 2024-09-01 ASSESSMENT — PAIN DESCRIPTION - DESCRIPTORS
DESCRIPTORS: BURNING;SHARP
DESCRIPTORS: BURNING;SPASM
DESCRIPTORS: SHARP
DESCRIPTORS: BURNING;SPASM
DESCRIPTORS: SHARP
DESCRIPTORS: SPASM
DESCRIPTORS: SPASM;SHOOTING

## 2024-09-01 ASSESSMENT — PAIN DESCRIPTION - FREQUENCY
FREQUENCY: CONTINUOUS

## 2024-09-01 ASSESSMENT — PAIN SCALES - WONG BAKER
WONGBAKER_NUMERICALRESPONSE: NO HURT

## 2024-09-01 ASSESSMENT — PAIN DESCRIPTION - ONSET
ONSET: PROGRESSIVE
ONSET: ON-GOING
ONSET: PROGRESSIVE

## 2024-09-01 ASSESSMENT — PAIN SCALES - GENERAL
PAINLEVEL_OUTOF10: 7
PAINLEVEL_OUTOF10: 7
PAINLEVEL_OUTOF10: 8
PAINLEVEL_OUTOF10: 4
PAINLEVEL_OUTOF10: 3
PAINLEVEL_OUTOF10: 8
PAINLEVEL_OUTOF10: 7
PAINLEVEL_OUTOF10: 7

## 2024-09-01 ASSESSMENT — PAIN DESCRIPTION - LOCATION
LOCATION: FOOT
LOCATION: FOOT;LEG
LOCATION: FOOT;LEG
LOCATION: FOOT
LOCATION: FOOT;LEG

## 2024-09-01 ASSESSMENT — PAIN DESCRIPTION - PAIN TYPE
TYPE: ACUTE PAIN;CHRONIC PAIN;SURGICAL PAIN
TYPE: SURGICAL PAIN;ACUTE PAIN
TYPE: SURGICAL PAIN
TYPE: SURGICAL PAIN;ACUTE PAIN
TYPE: SURGICAL PAIN

## 2024-09-01 NOTE — PROGRESS NOTES
V2.0  [facility] Hospitalist Progress Note      Name:  Yovanny Levine /Age/Sex: 1975  (49 y.o. male)   MRN & CSN:  [id number] & [id number] Encounter Date/Time: 2024 2:48 PM EDT    Location:  [address] [place] [address] PCP: José Luis Izquierdo MD       Hospital Day: 6      Subjective:     Chief Complaint:  Leg Pain (States leg pain since yesterday)     Patient seen and evaluated at bedside.  No acute events overnight.  Patient seen sitting on the side of the bed eating breakfast.  Denies any active complaints.  States he feels better today.  Communicating appropriately.  Plan of care discussed at length.  All questions answered.    Assessment and Plan:   Left foot cellulitis, w osteomyelitis of cuboid, lateral cunfeiform and proximal fourth metatarsal. Came w worsening l foot pain. Left foot x-ray showing signs of lateral midfoot ulceration concerning for possible osteomyelitis.  MRI confirmed diagnosis  - ID consulted  - now on vanc+cefepime+flagyl  - f/u blood cx ; now growing MRSA   - pain control  - general surgery consulted, appreciate recs.  Attempt to salvage foot    MRSA bacteremia  Blood cx 2024  MRSA  - on vancomycin as above  - repeat blood cxX2 from  growing light growth of MRSA  - ECHO limited ordered, EF 50%, no evidence of endocarditis  - ID following, cultures every 48h until clear    ESRD on hemodialysis   - nephrology has been consulted  - HD as per nephrology    History of chronic left-sided foot osteomyelitis   Has wound VAC in place since last admission.     - plan as above  - consulted general surgery as above  - wound care consulted    Coronary artery disease with history of PCI and stenting to LAD and LCx continue Plavix statin and Coreg    Diabetes mellitus insulin-dependent   Continue the patient on Lantus of 5 units and sliding scale insulin monitor for hypoglycemia      Hyperlipidemia on statin    Chronic pain disorder on home Percocet, added Dilaudid for breakthrough pain for  saphenous, common femoral, femoral, and popliteal veins. Normal compression and augmentation is demonstrated. The proximal calf veins are also patent. Additional notation made of mildly prominent nonpathologically enlarged left groin lymph nodes all of which demonstrate a short axis of less than 10 mm.     No evidence of deep venous thrombosis in the left lower extremity Electronically signed by Gonzales Simons MD    XR FOOT LEFT (MIN 3 VIEWS)    Result Date: 8/27/2024  Left Foot INDICATION: Trauma COMPARISON: X-ray foot June 2, 2024 TECHNIQUE: Three views of the left foot were obtained FINDINGS: Redemonstrated amputations from the proximal first metatarsal, at the fourth MTP joint, and the entire fifth ray. Amputation of the second metatarsal head. Soft tissue swelling and ulceration over the lateral midfoot with erosions at the base of the fourth metatarsal and cuboid. No evidence of acute fracture or dislocation.     Redemonstrated amputations described above. Lateral midfoot ulceration with associated erosive changes at the base of the fourth metatarsal and cuboid. Concerning for possible osteomyelitis. Correlate clinically. Consider MRI for further assessment. Electronically signed by DANA BANERJEE      Electronically signed by Milan Ramirez MD on 9/1/2024 at 9:42 AM

## 2024-09-01 NOTE — PROGRESS NOTES
Nephrology Progress Note        [address] [facility], [address] [place] [address]  Phone: [phone]  Office Hours: 8:30AM - 4:30PM  Monday - Friday 9/1/2024 7:32 AM  Subjective:   Admit Date: 8/27/2024  PCP: José Luis Izquierdo MD  Interval History: on nc  No heartburn last night  BP not controlled    Diet: ADULT DIET; Regular; 5 carb choices (75 gm/meal); No Added Salt (3-4 gm); Low Potassium (Less than 3000 mg/day); 1500 ml      Data:   Scheduled Meds:   pantoprazole  40 mg IntraVENous BID    calcium carbonate  1,000 mg Oral TID WC    metroNIDAZOLE  500 mg Oral 3 times per day    calcitRIOL  1 mcg Oral Daily    sevelamer  800 mg Oral TID WC    [Held by provider] amiodarone  200 mg Oral Daily    amLODIPine  5 mg Oral Daily    atorvastatin  40 mg Oral Nightly    [Held by provider] clopidogrel  75 mg Oral Daily    hydrALAZINE  25 mg Oral 3 times per day    isosorbide mononitrate  30 mg Oral Daily    insulin glargine  5 Units SubCUTAneous Nightly    insulin lispro  0-4 Units SubCUTAneous TID WC    insulin lispro  0-4 Units SubCUTAneous Nightly    sodium chloride flush  5-40 mL IntraVENous 2 times per day    epoetin paola-epbx  10,000 Units IntraVENous Once per day on Monday Wednesday Friday    cefepime  1,000 mg IntraVENous Q24H    heparin (porcine)  5,000 Units SubCUTAneous 3 times per day    vancomycin (VANCOCIN) intermittent dosing (placeholder)   Other RX Placeholder     Continuous Infusions:   dextrose      sodium chloride 25 mL/hr at 08/30/24 1656     PRN Meds:calcium carbonate, HYDROmorphone, oxyCODONE-acetaminophen, glucose, dextrose bolus **OR** dextrose bolus, glucagon (rDNA), dextrose, sodium chloride flush, sodium chloride, ondansetron **OR** ondansetron, polyethylene glycol, acetaminophen **OR** acetaminophen  I/O last 3 completed shifts:  In: 240 [P.O.:240]  Out: 400 [Urine:50; Emesis/NG output:350]  No intake/output data recorded.    Intake/Output Summary (Last 24 hours) at 9/1/2024  be continued  -Retacrit in HD for Anemia mgmt  -The fistula arm is only to be touched by the HD nurse please, no BP cuff, no IV ACcess in that arm  -BP monitoring, goal //increase imdur to 60mg daily                     Electronically signed by Melody James DO on 9/1/2024 at 7:32 AM    [facility]  MD SAMANTHA SMITH DO  [address] [facility],  [address] [place] [address]  PHONE: [phone]  FAX: [phone]

## 2024-09-01 NOTE — CONSULTS
PHARMACY VANCOMYCIN MONITORING SERVICE  Pharmacy consulted by Earnest Shafer  for monitoring and adjustment.    Indication for treatment: Vancomycin indication: Bone/Joint infection   Goal trough: pre-Hd - 21-24  AUC/PAMELA: 400-600    Risk Factors for MRSA Identified:   Hospitalization within the past 90 days, Received IV antibiotics within the past 90 days, Patient on hemodialysis    Pertinent Laboratory Values:   Temp Readings from Last 3 Encounters:   09/01/24 98.1 °F (36.7 °C)   08/27/24 98.5 °F (36.9 °C) (Temporal)   08/13/24 98 °F (36.7 °C) (Temporal)     Recent Labs     08/30/24  0301   WBC 5.8     Recent Labs     08/30/24  0301   BUN 73*   CREATININE 7.7*     Estimated Creatinine Clearance: 15 mL/min (A) (based on SCr of 7.7 mg/dL (H)).    Intake/Output Summary (Last 24 hours) at 9/1/2024 1006  Last data filed at 9/1/2024 0336  Gross per 24 hour   Intake 240 ml   Output 50 ml   Net 190 ml     Last Encounter Weight:  Wt Readings from Last 3 Encounters:   08/30/24 115.7 kg (255 lb)   06/12/24 125.5 kg (276 lb 10.8 oz)   05/22/24 117.9 kg (260 lb)       Pertinent Cultures:   Date    Source    Results  8/27   Blood    1/4 MRSA +  8/27   Urine                         No Growth  8/30                            Wound                                    MRSA+  8/30                            Blood                                    NG x48 hours           Vancomycin level:   TROUGH:    Recent Labs     08/30/24  0301   VANCOTROUGH 17.0       Assessment:  HPI: Intractable left foot pain with left foot x-ray showing signs of lateral midfoot ulceration concerning for possible osteomyelitis, Diabetes mellitus, ESRD on hemodialysis, Chronic pain disorder. Now blood culture in 1/4 positive for MRSA. Wound culture positive for MRSA.  SCr, BUN, and urine output: ESRD on hemodialysis  Day(s) of therapy: 5  Vancomycin concentration:   8/28: 19.8 mg/L (pre-HD level)  8/30: 17 mg/L (pre-HD level)    Plan:  iHD scheduled every  MWF  No vancomycin today, next level ordered for Monday prior to next HD session  Pharmacy will continue to monitor patient and adjust therapy as indicated    VANCOMYCIN CONCENTRATION SCHEDULED FOR 09/02 @ 0600    Thank you for the consult.  Kinsey Crump RPH, PharmD.  9/1/2024 10:06 AM

## 2024-09-01 NOTE — PLAN OF CARE
Problem: Discharge Planning  Goal: Discharge to home or other facility with appropriate resources  Outcome: Progressing     Problem: Pain  Goal: Verbalizes/displays adequate comfort level or baseline comfort level  Outcome: Progressing     Problem: Safety - Adult  Goal: Free from fall injury  Outcome: Progressing     Problem: Chronic Conditions and Co-morbidities  Goal: Patient's chronic conditions and co-morbidity symptoms are monitored and maintained or improved  Outcome: Progressing     Problem: ABCDS Injury Assessment  Goal: Absence of physical injury  Outcome: Progressing     Problem: Skin/Tissue Integrity - Adult  Goal: Incisions, wounds, or drain sites healing without S/S of infection  Outcome: Progressing     Problem: Gastrointestinal - Adult  Goal: Minimal or absence of nausea and vomiting  Outcome: Progressing     Problem: Infection - Adult  Goal: Absence of infection at discharge  Outcome: Progressing  Goal: Absence of infection during hospitalization  Outcome: Progressing     Problem: Metabolic/Fluid and Electrolytes - Adult  Goal: Electrolytes maintained within normal limits  Outcome: Progressing  Goal: Hemodynamic stability and optimal renal function maintained  Outcome: Progressing  Goal: Glucose maintained within prescribed range  Outcome: Progressing

## 2024-09-02 LAB
DOSE AMOUNT: NORMAL
DOSE TIME: NORMAL
GLUCOSE BLD-MCNC: 106 MG/DL (ref 70–99)
GLUCOSE BLD-MCNC: 117 MG/DL (ref 70–99)
GLUCOSE BLD-MCNC: 180 MG/DL (ref 70–99)
GLUCOSE BLD-MCNC: 96 MG/DL (ref 70–99)
VANCOMYCIN RANDOM: 22.4 UG/ML

## 2024-09-02 PROCEDURE — 90935 HEMODIALYSIS ONE EVALUATION: CPT

## 2024-09-02 PROCEDURE — 82962 GLUCOSE BLOOD TEST: CPT

## 2024-09-02 PROCEDURE — 6360000002 HC RX W HCPCS

## 2024-09-02 PROCEDURE — 6360000002 HC RX W HCPCS: Performed by: STUDENT IN AN ORGANIZED HEALTH CARE EDUCATION/TRAINING PROGRAM

## 2024-09-02 PROCEDURE — 2580000003 HC RX 258

## 2024-09-02 PROCEDURE — 6370000000 HC RX 637 (ALT 250 FOR IP): Performed by: INTERNAL MEDICINE

## 2024-09-02 PROCEDURE — 6370000000 HC RX 637 (ALT 250 FOR IP): Performed by: STUDENT IN AN ORGANIZED HEALTH CARE EDUCATION/TRAINING PROGRAM

## 2024-09-02 PROCEDURE — 6370000000 HC RX 637 (ALT 250 FOR IP): Performed by: HOSPITALIST

## 2024-09-02 PROCEDURE — 6360000002 HC RX W HCPCS: Performed by: INTERNAL MEDICINE

## 2024-09-02 PROCEDURE — 36415 COLL VENOUS BLD VENIPUNCTURE: CPT

## 2024-09-02 PROCEDURE — 94761 N-INVAS EAR/PLS OXIMETRY MLT: CPT

## 2024-09-02 PROCEDURE — 1200000000 HC SEMI PRIVATE

## 2024-09-02 PROCEDURE — 2580000003 HC RX 258: Performed by: STUDENT IN AN ORGANIZED HEALTH CARE EDUCATION/TRAINING PROGRAM

## 2024-09-02 PROCEDURE — 80202 ASSAY OF VANCOMYCIN: CPT

## 2024-09-02 RX ORDER — MINERAL OIL/HYDROPHIL PETROLAT
OINTMENT (GRAM) TOPICAL 2 TIMES DAILY PRN
Status: DISCONTINUED | OUTPATIENT
Start: 2024-09-02 | End: 2024-09-02 | Stop reason: SDUPTHER

## 2024-09-02 RX ORDER — SKIN PROTECTANT 44 G/100G
OINTMENT TOPICAL 2 TIMES DAILY PRN
Status: DISCONTINUED | OUTPATIENT
Start: 2024-09-02 | End: 2024-09-03 | Stop reason: HOSPADM

## 2024-09-02 RX ORDER — MINERAL OIL/HYDROPHIL PETROLAT
OINTMENT (GRAM) TOPICAL 2 TIMES DAILY PRN
Status: DISCONTINUED | OUTPATIENT
Start: 2024-09-02 | End: 2024-09-02 | Stop reason: CLARIF

## 2024-09-02 RX ORDER — HEPARIN SODIUM 1000 [USP'U]/ML
5000 INJECTION, SOLUTION INTRAVENOUS; SUBCUTANEOUS
Status: COMPLETED | OUTPATIENT
Start: 2024-09-02 | End: 2024-09-02

## 2024-09-02 RX ADMIN — SENNOSIDES 8.6 MG: 8.6 TABLET, FILM COATED ORAL at 22:26

## 2024-09-02 RX ADMIN — METRONIDAZOLE 500 MG: 250 TABLET ORAL at 05:00

## 2024-09-02 RX ADMIN — METRONIDAZOLE 500 MG: 250 TABLET ORAL at 15:05

## 2024-09-02 RX ADMIN — CEFEPIME 1000 MG: 1 INJECTION, POWDER, FOR SOLUTION INTRAMUSCULAR; INTRAVENOUS at 22:38

## 2024-09-02 RX ADMIN — METRONIDAZOLE 500 MG: 250 TABLET ORAL at 22:26

## 2024-09-02 RX ADMIN — OXYCODONE HYDROCHLORIDE 10 MG: 10 TABLET ORAL at 17:27

## 2024-09-02 RX ADMIN — GABAPENTIN 100 MG: 100 CAPSULE ORAL at 22:26

## 2024-09-02 RX ADMIN — SODIUM CHLORIDE, PRESERVATIVE FREE 10 ML: 5 INJECTION INTRAVENOUS at 22:36

## 2024-09-02 RX ADMIN — HEPARIN SODIUM 5000 UNITS: 5000 INJECTION INTRAVENOUS; SUBCUTANEOUS at 22:27

## 2024-09-02 RX ADMIN — CALCIUM CARBONATE 1000 MG: 500 TABLET, CHEWABLE ORAL at 17:23

## 2024-09-02 RX ADMIN — HYDRALAZINE HYDROCHLORIDE 25 MG: 25 TABLET ORAL at 15:05

## 2024-09-02 RX ADMIN — GABAPENTIN 100 MG: 100 CAPSULE ORAL at 12:51

## 2024-09-02 RX ADMIN — SEVELAMER CARBONATE 800 MG: 800 TABLET, FILM COATED ORAL at 17:23

## 2024-09-02 RX ADMIN — OXYCODONE HYDROCHLORIDE 10 MG: 10 TABLET ORAL at 05:00

## 2024-09-02 RX ADMIN — ISOSORBIDE MONONITRATE 60 MG: 60 TABLET, EXTENDED RELEASE ORAL at 12:56

## 2024-09-02 RX ADMIN — OXYCODONE HYDROCHLORIDE 10 MG: 10 TABLET ORAL at 22:26

## 2024-09-02 RX ADMIN — SODIUM CHLORIDE, PRESERVATIVE FREE 10 ML: 5 INJECTION INTRAVENOUS at 13:02

## 2024-09-02 RX ADMIN — HEPARIN SODIUM 5000 UNITS: 5000 INJECTION INTRAVENOUS; SUBCUTANEOUS at 15:04

## 2024-09-02 RX ADMIN — POLYETHYLENE GLYCOL (3350) 17 G: 17 POWDER, FOR SOLUTION ORAL at 12:50

## 2024-09-02 RX ADMIN — CLOPIDOGREL BISULFATE 75 MG: 75 TABLET ORAL at 12:52

## 2024-09-02 RX ADMIN — CALCITRIOL CAPSULES 0.25 MCG 1 MCG: 0.25 CAPSULE ORAL at 12:50

## 2024-09-02 RX ADMIN — AMIODARONE HYDROCHLORIDE 200 MG: 200 TABLET ORAL at 12:52

## 2024-09-02 RX ADMIN — ATORVASTATIN CALCIUM 40 MG: 40 TABLET, FILM COATED ORAL at 22:25

## 2024-09-02 RX ADMIN — HYDRALAZINE HYDROCHLORIDE 25 MG: 25 TABLET ORAL at 22:27

## 2024-09-02 RX ADMIN — ONDANSETRON 4 MG: 2 INJECTION INTRAMUSCULAR; INTRAVENOUS at 06:32

## 2024-09-02 RX ADMIN — SEVELAMER CARBONATE 800 MG: 800 TABLET, FILM COATED ORAL at 12:51

## 2024-09-02 RX ADMIN — HEPARIN SODIUM 5000 UNITS: 1000 INJECTION INTRAVENOUS; SUBCUTANEOUS at 07:48

## 2024-09-02 RX ADMIN — HYDRALAZINE HYDROCHLORIDE 25 MG: 25 TABLET ORAL at 05:00

## 2024-09-02 RX ADMIN — OXYCODONE HYDROCHLORIDE 10 MG: 10 TABLET ORAL at 12:53

## 2024-09-02 RX ADMIN — ACETAMINOPHEN 1000 MG: 500 TABLET ORAL at 22:25

## 2024-09-02 RX ADMIN — EPOETIN ALFA-EPBX 10000 UNITS: 10000 INJECTION, SOLUTION INTRAVENOUS; SUBCUTANEOUS at 07:49

## 2024-09-02 RX ADMIN — PANTOPRAZOLE SODIUM 40 MG: 40 TABLET, DELAYED RELEASE ORAL at 06:32

## 2024-09-02 RX ADMIN — INSULIN GLARGINE 29 UNITS: 100 INJECTION, SOLUTION SUBCUTANEOUS at 22:27

## 2024-09-02 RX ADMIN — AMLODIPINE BESYLATE 5 MG: 5 TABLET ORAL at 12:53

## 2024-09-02 RX ADMIN — VANCOMYCIN HYDROCHLORIDE 1000 MG: 1 INJECTION, POWDER, LYOPHILIZED, FOR SOLUTION INTRAVENOUS at 15:09

## 2024-09-02 RX ADMIN — CALCIUM CARBONATE 1000 MG: 500 TABLET, CHEWABLE ORAL at 12:51

## 2024-09-02 RX ADMIN — SODIUM CHLORIDE: 9 INJECTION, SOLUTION INTRAVENOUS at 22:37

## 2024-09-02 RX ADMIN — HEPARIN SODIUM 5000 UNITS: 5000 INJECTION INTRAVENOUS; SUBCUTANEOUS at 05:01

## 2024-09-02 ASSESSMENT — PAIN DESCRIPTION - LOCATION
LOCATION: FOOT;LEG
LOCATION: FOOT

## 2024-09-02 ASSESSMENT — PAIN SCALES - GENERAL
PAINLEVEL_OUTOF10: 8
PAINLEVEL_OUTOF10: 7
PAINLEVEL_OUTOF10: 8
PAINLEVEL_OUTOF10: 8
PAINLEVEL_OUTOF10: 0
PAINLEVEL_OUTOF10: 7
PAINLEVEL_OUTOF10: 7
PAINLEVEL_OUTOF10: 8

## 2024-09-02 ASSESSMENT — PAIN DESCRIPTION - ONSET: ONSET: PROGRESSIVE

## 2024-09-02 ASSESSMENT — PAIN DESCRIPTION - DESCRIPTORS
DESCRIPTORS: SPASM;SHARP
DESCRIPTORS: BURNING;SHARP
DESCRIPTORS: BURNING
DESCRIPTORS: THROBBING

## 2024-09-02 ASSESSMENT — PAIN DESCRIPTION - ORIENTATION
ORIENTATION: LEFT

## 2024-09-02 ASSESSMENT — PAIN DESCRIPTION - FREQUENCY: FREQUENCY: CONTINUOUS

## 2024-09-02 ASSESSMENT — PAIN SCALES - WONG BAKER: WONGBAKER_NUMERICALRESPONSE: NO HURT

## 2024-09-02 ASSESSMENT — PAIN DESCRIPTION - PAIN TYPE: TYPE: SURGICAL PAIN;ACUTE PAIN

## 2024-09-02 NOTE — PROGRESS NOTES
Nephrology  Dialysis Note        [address] [facility], [address] [place] [address]  Phone: [phone]  Office Hours: 8:30AM - 4:30PM  Monday - Friday          PROCEDURE:  Patient seen during hemodialysis      PHYSICIAN:  ANTHONY      INDICATION:  End-stage renal disease      RX:  See dialysis flowsheet for specifics on access, blood flow rate, dialysate baths, duration of dialysis, anticoagulation and other technical information.      COMMENTS:  SEEN IN HD  ON NC  SET TO LOSE 3.5KG IF BP ALLOWS    Electronically signed by Melody James DO on 9/2/2024 at 9:19 AM    [facility]  MD SAMANTHA SMITH DO  [address] [facility],  [address] [place] [address]  PHONE: [phone]  FAX: [phone]

## 2024-09-02 NOTE — PROGRESS NOTES
Patient Name: Yovanny Levine  Patient : 1975  MRN: [id number]     Acct: [id number]  Date of Admission: 2024  Room/Bed: 3030/3030-A  Code Status:  Full Code  Allergies:   Allergies   Allergen Reactions    Adhesive Tape Rash    Doxycycline Nausea And Vomiting    Reglan [Metoclopramide] Anxiety     Diagnosis:    Patient Active Problem List   Diagnosis    Hiatal hernia    Diabetes mellitus type 2, improved controlled    Diabetic neuropathy (HCC)    Panic attack    Anxiety associated with depression    Neck pain    DANIELA (obstructive sleep apnea)    Urinary frequency    Bilateral carpal tunnel syndrome- left worse than right    Hyperlipidemia with target LDL less than 100    Essential hypertension    Bronchitis    Osteomyelitis (HCC)    Abscess    Periumbilical hernia    Sepsis (HCC)    Cellulitis of left foot    Constipation    Intractable nausea and vomiting    Uncontrolled type 2 diabetes mellitus    Nausea and vomiting    Diabetic foot infection (HCC)    Acute renal failure (ARF) (HCC)    Cellulitis    Cellulitis of left arm    Cellulitis, scrotum    Acute epididymitis    Irene gangrene    Recurrent major depressive disorder, in full remission (HCC)    Generalized anxiety disorder    Morbid obesity with body mass index (BMI) of 40.0 to 44.9 in adult (HCC)    Necrotizing fasciitis (HCC)    Syncope    Right groin wound    Ulcer of right groin with fat layer exposed (HCC)    Diabetic ulcer of left midfoot associated with type 2 diabetes mellitus (HCC)    Nephrotic syndrome    Generalized edema    Stage 3b chronic kidney disease (HCC)    Diabetic nephropathy associated with type 2 diabetes mellitus (HCC)    Hypoalbuminemia    Chronic kidney disease, stage IV (severe) (HCC)    FH: CAD (coronary artery disease)    ASCVD (arteriosclerotic cardiovascular disease)    Other proteinuria    Chest pain    Surgical wound dehiscence    CARMEN (acute kidney injury) (HCC)    Anemia    Chest tightness    NSTEMI (non-ST  F180  Dialysate Temperature (C):  36  Blood Flow Rate (BFR):  350   Dialysate Flow Rate (DFR):   700        Access to be Utilized   Access: AVF  Location: Upper Extremity  Side: Left   Needle gauge:  16  + Bruit/Thrill: Yes    First Use X-ray Verified: Not Applicable  OK to use line order: Not Applicable    Site Assessment:  Signs and Symptoms of Infection/Inflammation: None  If yes: Not Applicable  Dressing: Dry and Intact  Site Prep: Medical Aseptic Technique  Dressing Changed this Treatment: Yes  Gauze Dressing?: Yes  Non Dialysis Use?: No  Comment:    Flows: Good, Patent  If access problem, who was notified:     Pre and Post-Assessment  Patient Vitals for the past 8 hrs:   Level of Consciousness Oriented X Heart Rhythm Respiratory Quality/Effort O2 Device Bilateral Breath Sounds Skin Color Skin Condition/Temp Abdomen Inspection Bowel Sounds (All Quadrants) Edema RLE Edema LLE Edema Pain Level Response to Pain Intervention   09/02/24 0409 -- -- -- -- Nasal cannula -- -- -- -- -- -- -- -- -- --   09/02/24 0458 -- -- -- -- -- -- -- -- -- -- -- -- -- 8 --   09/02/24 0528 -- -- -- -- -- -- -- -- -- -- -- -- -- 7 Pain improved but above pain goal   09/02/24 0530 -- -- -- -- -- -- -- -- -- -- -- -- -- 7 Pain improved but above pain goal   09/02/24 0730 0 -- -- Unlabored -- -- Pink Dry;Warm Obese -- Left lower extremity Non-pitting +3;Other (comment) -- --   09/02/24 0741 0 4 Regular Unlabored Nasal cannula Clear Pink Dry;Warm Obese;Distended Active Left lower extremity Non-pitting Non-pitting -- --     Labs  No results for input(s): \"WBC\", \"HGB\", \"HCT\", \"PLT\" in the last 72 hours.                                                               No results for input(s): \"NA\", \"K\", \"CL\", \"CO2\", \"BUN\", \"CREATININE\", \"GLUCOSE\", \"PHOS\" in the last 72 hours.    Invalid input(s): \"CA\"  IV Drips and Rate/Dose   dextrose      sodium chloride 25 mL/hr at 08/30/24 7572      Safety - Before each treatment:   Dialysis Machine No.:    09/02/24 0930 350 ml/min 1000 ml/hr 1570 ml -20 mmHg 80 100 700 pt resting with eyes closed Yes   09/02/24 0945 350 ml/min 1000 ml/hr 1840 ml -30 mmHg 80 100 700 lines secure Yes   09/02/24 1000 350 ml/min 1000 ml/hr 2069 ml -30 mmHg 80 100 700 pt laying with eyes closed and mouth open Yes   09/02/24 1015 350 ml/min 1000 ml/hr 2320 ml -20 mmHg 90 100 700 no HD complications Yes   09/02/24 1030 350 ml/min 1000 ml/hr 2574 ml -40 mmHg 80 100 700 pt awake and talking Yes   09/02/24 1045 350 ml/min 1000 ml/hr 2802 ml -40 mmHg 90 100 700 pt asking what his bp is Yes   09/02/24 1100 350 ml/min 1000 ml/hr 3050 ml -40 mmHg 90 80 700 pt resting with eyes closed Yes   09/02/24 1115 350 ml/min 1000 ml/hr 3300 ml -30 mmHg 80 80 700 bicarb jug changed Yes   09/02/24 1130 350 ml/min 1000 ml/hr 3560 ml -30 mmHg 80 80 700 pt resting with eyes closed Yes   09/02/24 1145 350 ml/min 1100 ml/hr 3834 ml -30 mmHg 90 100 700 no needs Yes   09/02/24 1156 -- -- 4000 ml -- -- -- -- tx complete --     Vital Signs  Patient Vitals for the past 8 hrs:   BP Temp Pulse Resp SpO2   09/02/24 0409 (!) 160/92 -- 76 -- 97 %   09/02/24 0500 (!) 160/92 -- -- -- --   09/02/24 0530 -- -- -- 18 --   09/02/24 0741 (!) 155/99 97.9 °F (36.6 °C) 72 15 96 %   09/02/24 0755 (!) 155/102 -- 70 -- --   09/02/24 0800 (!) 150/102 -- 71 -- --   09/02/24 0815 (!) 134/107 -- 71 -- --   09/02/24 0830 (!) 135/105 -- 71 -- --   09/02/24 0845 (!) 153/97 -- 71 -- --   09/02/24 0900 (!) 145/89 -- 70 -- --   09/02/24 0915 (!) 156/87 -- 71 -- --   09/02/24 0930 (!) 151/100 -- 71 -- --   09/02/24 0945 (!) 164/105 -- 72 -- --   09/02/24 1000 (!) 156/124 -- 73 -- --   09/02/24 1015 (!) 152/83 -- 72 -- --   09/02/24 1030 (!) 152/83 -- 74 -- --   09/02/24 1045 (!) 170/88 -- 74 -- --   09/02/24 1100 (!) 145/90 -- 77 -- --   09/02/24 1115 (!) 162/98 -- 74 -- --   09/02/24 1130 (!) 172/93 -- 77 -- --   09/02/24 1145 (!) 162/86 -- 76 -- --   09/02/24 1156 (!) 170/96 -- 74 -- --

## 2024-09-02 NOTE — PROGRESS NOTES
V2.0  [facility] [place] [facility] Progress Note      Name:  Yovanny Levine /Age/Sex: 1975  (49 y.o. male)   MRN & CSN:  [id number] & [id number] Encounter Date/Time: 2024 2:48 PM EDT    Location:  [address] [place] [address]- PCP: José Luis Izquierdo MD       Hospital Day: 7      Subjective:     Chief Complaint:  Leg Pain (States leg pain since yesterday)     Patient seen and evaluated at bedside.  No acute events overnight.  Patient seen sitting on the side of the bed eating breakfast.  Denies any active complaints.  States he feels better today.  Communicating appropriately.  Plan of care discussed at length.  All questions answered.    Assessment and Plan:   Left foot cellulitis, w osteomyelitis of cuboid, lateral cunfeiform and proximal fourth metatarsal. Came w worsening l foot pain. Left foot x-ray showing signs of lateral midfoot ulceration concerning for possible osteomyelitis.  MRI confirmed diagnosis  - ID consulted  - now on vanc+cefepime+flagyl  - f/u blood cx ; now growing MRSA   - pain control  - general surgery consulted, appreciate recs.  Attempt to salvage foot    MRSA bacteremia  Blood cx 2024  MRSA  - on vancomycin as above  - repeat blood cxX2 from  NGTD, growing light growth of MRSA in wound culture  - ECHO limited ordered, EF 50%, no evidence of endocarditis  - ID following, cultures every 48h until clear    ESRD on hemodialysis   - nephrology has been consulted  - HD as per nephrology    History of chronic left-sided foot osteomyelitis   Has wound VAC in place since last admission.     - plan as above  - consulted general surgery as above  - wound care consulted    Coronary artery disease with history of PCI and stenting to LAD and LCx continue Plavix statin and Coreg    Diabetes mellitus insulin-dependent   Continue the patient on Lantus of 5 units and sliding scale insulin monitor for hypoglycemia      Hyperlipidemia on statin    Chronic pain disorder on home Percocet, added Dilaudid for  senna  1 tablet Oral Nightly    calcium carbonate  1,000 mg Oral TID     metroNIDAZOLE  500 mg Oral 3 times per day    calcitRIOL  1 mcg Oral Daily    sevelamer  800 mg Oral TID     amiodarone  200 mg Oral Daily    amLODIPine  5 mg Oral Daily    atorvastatin  40 mg Oral Nightly    clopidogrel  75 mg Oral Daily    hydrALAZINE  25 mg Oral 3 times per day    sodium chloride flush  5-40 mL IntraVENous 2 times per day    epoetin paola-epbx  10,000 Units IntraVENous Once per day on Monday Wednesday Friday    cefepime  1,000 mg IntraVENous Q24H    heparin (porcine)  5,000 Units SubCUTAneous 3 times per day    vancomycin (VANCOCIN) intermittent dosing (placeholder)   Other RX Placeholder      Infusions:    dextrose      sodium chloride 25 mL/hr at 08/30/24 1656     PRN Meds: glucose, 4 tablet, PRN  dextrose bolus, 125 mL, PRN   Or  dextrose bolus, 250 mL, PRN  glucagon (rDNA), 1 mg, PRN  dextrose, , Continuous PRN  oxyCODONE, 5 mg, Q4H PRN   Or  oxyCODONE, 10 mg, Q4H PRN  sodium chloride flush, 5-40 mL, PRN  sodium chloride, , PRN  ondansetron, 4 mg, Q8H PRN   Or  ondansetron, 4 mg, Q6H PRN        Labs      Recent Results (from the past 24 hour(s))   POCT Glucose    Collection Time: 09/01/24 11:18 AM   Result Value Ref Range    POC Glucose 157 (H) 70 - 99 MG/DL   POCT Glucose    Collection Time: 09/01/24  5:24 PM   Result Value Ref Range    POC Glucose 198 (H) 70 - 99 MG/DL   POCT Glucose    Collection Time: 09/01/24  8:20 PM   Result Value Ref Range    POC Glucose 183 (H) 70 - 99 MG/DL   Vancomycin Level, Random    Collection Time: 09/02/24  5:15 AM   Result Value Ref Range    Vancomycin Rm 22.4 UG/ML    DOSE AMOUNT DOSE AMT. GIVEN - 1250mg     DOSE TIME DOSE TIME GIVEN - 8/30 @16:00    POCT Glucose    Collection Time: 09/02/24  6:58 AM   Result Value Ref Range    POC Glucose 106 (H) 70 - 99 MG/DL        Imaging/Diagnostics Last 24 Hours   Vascular duplex lower extremity venous left    Result Date: 8/27/2024  Left lower

## 2024-09-02 NOTE — PLAN OF CARE
Problem: Discharge Planning  Goal: Discharge to home or other facility with appropriate resources  Outcome: Progressing     Problem: Pain  Goal: Verbalizes/displays adequate comfort level or baseline comfort level  Outcome: Progressing     Problem: Safety - Adult  Goal: Free from fall injury  Outcome: Progressing     Problem: ABCDS Injury Assessment  Goal: Absence of physical injury  Outcome: Progressing     Problem: Skin/Tissue Integrity - Adult  Goal: Incisions, wounds, or drain sites healing without S/S of infection  Outcome: Progressing     Problem: Gastrointestinal - Adult  Goal: Minimal or absence of nausea and vomiting  Outcome: Progressing     Problem: Infection - Adult  Goal: Absence of infection at discharge  Outcome: Progressing  Goal: Absence of infection during hospitalization  Outcome: Progressing     Problem: Metabolic/Fluid and Electrolytes - Adult  Goal: Electrolytes maintained within normal limits  Outcome: Progressing  Goal: Hemodynamic stability and optimal renal function maintained  Outcome: Progressing  Goal: Glucose maintained within prescribed range  Outcome: Progressing

## 2024-09-02 NOTE — CONSULTS
PHARMACY VANCOMYCIN MONITORING SERVICE  Pharmacy consulted by Earnest Shafer  for monitoring and adjustment.    Indication for treatment: Vancomycin indication: Bone/Joint infection   Goal trough: pre-Hd - 21-24  AUC/PAMELA: 400-600    Risk Factors for MRSA Identified:   Hospitalization within the past 90 days, Received IV antibiotics within the past 90 days, Patient on hemodialysis    Pertinent Laboratory Values:   Temp Readings from Last 3 Encounters:   09/02/24 97.9 °F (36.6 °C) (Oral)   08/27/24 98.5 °F (36.9 °C) (Temporal)   08/13/24 98 °F (36.7 °C) (Temporal)     No results for input(s): \"WBC\", \"LACTATE\" in the last 72 hours.    No results for input(s): \"BUN\", \"CREATININE\" in the last 72 hours.    Estimated Creatinine Clearance: 15 mL/min (A) (based on SCr of 7.7 mg/dL (H)).    Intake/Output Summary (Last 24 hours) at 9/2/2024 1110  Last data filed at 9/1/2024 2101  Gross per 24 hour   Intake 960 ml   Output 100 ml   Net 860 ml     Last Encounter Weight:  Wt Readings from Last 3 Encounters:   08/30/24 115.7 kg (255 lb)   06/12/24 125.5 kg (276 lb 10.8 oz)   05/22/24 117.9 kg (260 lb)       Pertinent Cultures:   Date    Source    Results  8/27   Blood    1/4 MRSA +  8/27   Urine                         No Growth  8/30                            Wound                                    MRSA+  8/30                            Blood                                    NG x48 hours         VANCOMYCIN TROUGH:  No results for input(s): \"VANCOTROUGH\" in the last 72 hours.  VANCOMYCIN RANDOM:    Recent Labs     09/02/24  0515   VANCORANDOM 22.4       Assessment:  HPI: Intractable left foot pain with left foot x-ray showing signs of lateral midfoot ulceration concerning for possible osteomyelitis, Diabetes mellitus, ESRD on hemodialysis, Chronic pain disorder. Now blood culture in 1/4 positive for MRSA. Wound culture positive for MRSA.  SCr, BUN, and urine output: ESRD on hemodialysis on M/W/F  Day(s) of therapy: 6 (ID  following)  Vancomycin concentration:   8/28: 19.8 mg/L (pre-HD level)  8/30: 17 mg/L (pre-HD level)  9/2:  22.4 mg/L (pre-HD level, ok to re-dose)    Plan:  Give vancomycin 1000mg ivpb x1 dose after dialysis today  Next level ordered for Wednesday pre-HD  Pharmacy will continue to monitor patient and adjust therapy as indicated    VANCOMYCIN CONCENTRATION SCHEDULED FOR 09/04 @ 0600    Thank you for the consult.  Judah Franco RPH  9/2/2024 11:10 AM

## 2024-09-03 VITALS
HEIGHT: 69 IN | TEMPERATURE: 97.9 F | DIASTOLIC BLOOD PRESSURE: 90 MMHG | RESPIRATION RATE: 20 BRPM | BODY MASS INDEX: 37.77 KG/M2 | OXYGEN SATURATION: 98 % | WEIGHT: 255 LBS | SYSTOLIC BLOOD PRESSURE: 146 MMHG | HEART RATE: 72 BPM

## 2024-09-03 LAB
ANION GAP SERPL CALCULATED.3IONS-SCNC: 21 MMOL/L (ref 7–16)
BASOPHILS ABSOLUTE: 0.1 K/CU MM
BASOPHILS RELATIVE PERCENT: 0.6 % (ref 0–1)
BUN SERPL-MCNC: 73 MG/DL (ref 6–23)
CALCIUM SERPL-MCNC: 9 MG/DL (ref 8.3–10.6)
CHLORIDE BLD-SCNC: 95 MMOL/L (ref 99–110)
CO2: 21 MMOL/L (ref 21–32)
CREAT SERPL-MCNC: 8.1 MG/DL (ref 0.9–1.3)
DIFFERENTIAL TYPE: ABNORMAL
EOSINOPHILS ABSOLUTE: 0.2 K/CU MM
EOSINOPHILS RELATIVE PERCENT: 2.6 % (ref 0–3)
ESTIMATED AVERAGE GLUCOSE: 117 MG/DL
GFR, ESTIMATED: 7 ML/MIN/1.73M2
GLUCOSE BLD-MCNC: 138 MG/DL (ref 70–99)
GLUCOSE BLD-MCNC: 140 MG/DL (ref 70–99)
GLUCOSE BLD-MCNC: 169 MG/DL (ref 70–99)
GLUCOSE SERPL-MCNC: 176 MG/DL (ref 70–99)
HBA1C MFR BLD: 5.7 % (ref 4.2–6.3)
HCT VFR BLD CALC: 28.1 % (ref 42–52)
HEMOGLOBIN: 8.8 GM/DL (ref 13.5–18)
IMMATURE NEUTROPHIL %: 1.4 % (ref 0–0.43)
LYMPHOCYTES ABSOLUTE: 0.5 K/CU MM
LYMPHOCYTES RELATIVE PERCENT: 5.3 % (ref 24–44)
MCH RBC QN AUTO: 29.9 PG (ref 27–31)
MCHC RBC AUTO-ENTMCNC: 31.3 % (ref 32–36)
MCV RBC AUTO: 95.6 FL (ref 78–100)
MONOCYTES ABSOLUTE: 0.8 K/CU MM
MONOCYTES RELATIVE PERCENT: 9.1 % (ref 0–4)
NEUTROPHILS ABSOLUTE: 6.9 K/CU MM
NEUTROPHILS RELATIVE PERCENT: 81 % (ref 36–66)
NUCLEATED RBC %: 0.2 %
PDW BLD-RTO: 16.3 % (ref 11.7–14.9)
PLATELET # BLD: 185 K/CU MM (ref 140–440)
PMV BLD AUTO: 9.9 FL (ref 7.5–11.1)
POTASSIUM SERPL-SCNC: 4.8 MMOL/L (ref 3.5–5.1)
RBC # BLD: 2.94 M/CU MM (ref 4.6–6.2)
SODIUM BLD-SCNC: 137 MMOL/L (ref 135–145)
TOTAL IMMATURE NEUTOROPHIL: 0.12 K/CU MM
TOTAL NUCLEATED RBC: 0 K/CU MM
WBC # BLD: 8.5 K/CU MM (ref 4–10.5)

## 2024-09-03 PROCEDURE — 82962 GLUCOSE BLOOD TEST: CPT

## 2024-09-03 PROCEDURE — 6370000000 HC RX 637 (ALT 250 FOR IP): Performed by: HOSPITALIST

## 2024-09-03 PROCEDURE — 80048 BASIC METABOLIC PNL TOTAL CA: CPT

## 2024-09-03 PROCEDURE — 6370000000 HC RX 637 (ALT 250 FOR IP): Performed by: STUDENT IN AN ORGANIZED HEALTH CARE EDUCATION/TRAINING PROGRAM

## 2024-09-03 PROCEDURE — 2580000003 HC RX 258: Performed by: STUDENT IN AN ORGANIZED HEALTH CARE EDUCATION/TRAINING PROGRAM

## 2024-09-03 PROCEDURE — 36415 COLL VENOUS BLD VENIPUNCTURE: CPT

## 2024-09-03 PROCEDURE — 85025 COMPLETE CBC W/AUTO DIFF WBC: CPT

## 2024-09-03 PROCEDURE — 6360000002 HC RX W HCPCS

## 2024-09-03 PROCEDURE — 6370000000 HC RX 637 (ALT 250 FOR IP): Performed by: INTERNAL MEDICINE

## 2024-09-03 PROCEDURE — 99232 SBSQ HOSP IP/OBS MODERATE 35: CPT | Performed by: INTERNAL MEDICINE

## 2024-09-03 PROCEDURE — 97605 NEG PRS WND THER DME<=50SQCM: CPT

## 2024-09-03 PROCEDURE — 2700000000 HC OXYGEN THERAPY PER DAY

## 2024-09-03 PROCEDURE — 94761 N-INVAS EAR/PLS OXIMETRY MLT: CPT

## 2024-09-03 RX ORDER — DIPHENHYDRAMINE HCL 25 MG
25 TABLET ORAL EVERY 8 HOURS PRN
Status: DISCONTINUED | OUTPATIENT
Start: 2024-09-03 | End: 2024-09-03 | Stop reason: HOSPADM

## 2024-09-03 RX ORDER — GABAPENTIN 100 MG/1
100 CAPSULE ORAL 3 TIMES DAILY
Qty: 90 CAPSULE | Refills: 0 | Status: SHIPPED | OUTPATIENT
Start: 2024-09-03 | End: 2024-10-03

## 2024-09-03 RX ORDER — SEVELAMER CARBONATE 800 MG/1
800 TABLET, FILM COATED ORAL
Qty: 90 TABLET | Refills: 3 | Status: SHIPPED | OUTPATIENT
Start: 2024-09-03

## 2024-09-03 RX ORDER — CALCITRIOL 0.5 UG/1
0.5 CAPSULE, LIQUID FILLED ORAL 2 TIMES DAILY
Qty: 30 CAPSULE | Refills: 0 | Status: SHIPPED | OUTPATIENT
Start: 2024-09-03

## 2024-09-03 RX ORDER — SENNOSIDES A AND B 8.6 MG/1
1 TABLET, FILM COATED ORAL NIGHTLY
Qty: 30 TABLET | Refills: 0 | Status: SHIPPED | OUTPATIENT
Start: 2024-09-03 | End: 2024-10-03

## 2024-09-03 RX ADMIN — GABAPENTIN 100 MG: 100 CAPSULE ORAL at 09:31

## 2024-09-03 RX ADMIN — OXYCODONE HYDROCHLORIDE 10 MG: 10 TABLET ORAL at 16:19

## 2024-09-03 RX ADMIN — ACETAMINOPHEN 1000 MG: 500 TABLET ORAL at 14:16

## 2024-09-03 RX ADMIN — HYDRALAZINE HYDROCHLORIDE 25 MG: 25 TABLET ORAL at 05:46

## 2024-09-03 RX ADMIN — INSULIN LISPRO 6 UNITS: 100 INJECTION, SOLUTION INTRAVENOUS; SUBCUTANEOUS at 12:35

## 2024-09-03 RX ADMIN — HEPARIN SODIUM 5000 UNITS: 5000 INJECTION INTRAVENOUS; SUBCUTANEOUS at 14:16

## 2024-09-03 RX ADMIN — POLYETHYLENE GLYCOL (3350) 17 G: 17 POWDER, FOR SOLUTION ORAL at 09:31

## 2024-09-03 RX ADMIN — CALCIUM CARBONATE 1000 MG: 500 TABLET, CHEWABLE ORAL at 12:35

## 2024-09-03 RX ADMIN — SEVELAMER CARBONATE 800 MG: 800 TABLET, FILM COATED ORAL at 09:31

## 2024-09-03 RX ADMIN — AMIODARONE HYDROCHLORIDE 200 MG: 200 TABLET ORAL at 09:31

## 2024-09-03 RX ADMIN — AMLODIPINE BESYLATE 5 MG: 5 TABLET ORAL at 09:31

## 2024-09-03 RX ADMIN — OXYCODONE HYDROCHLORIDE 10 MG: 10 TABLET ORAL at 05:54

## 2024-09-03 RX ADMIN — OXYCODONE HYDROCHLORIDE 5 MG: 5 TABLET ORAL at 10:51

## 2024-09-03 RX ADMIN — SKIN PROTECTANT: 44 OINTMENT TOPICAL at 10:52

## 2024-09-03 RX ADMIN — ACETAMINOPHEN 1000 MG: 500 TABLET ORAL at 09:31

## 2024-09-03 RX ADMIN — SEVELAMER CARBONATE 800 MG: 800 TABLET, FILM COATED ORAL at 12:35

## 2024-09-03 RX ADMIN — HEPARIN SODIUM 5000 UNITS: 5000 INJECTION INTRAVENOUS; SUBCUTANEOUS at 05:47

## 2024-09-03 RX ADMIN — CALCITRIOL CAPSULES 0.25 MCG 1 MCG: 0.25 CAPSULE ORAL at 09:31

## 2024-09-03 RX ADMIN — ISOSORBIDE MONONITRATE 60 MG: 60 TABLET, EXTENDED RELEASE ORAL at 09:31

## 2024-09-03 RX ADMIN — CALCIUM CARBONATE 1000 MG: 500 TABLET, CHEWABLE ORAL at 09:30

## 2024-09-03 RX ADMIN — METRONIDAZOLE 500 MG: 250 TABLET ORAL at 05:46

## 2024-09-03 RX ADMIN — CLOPIDOGREL BISULFATE 75 MG: 75 TABLET ORAL at 09:31

## 2024-09-03 RX ADMIN — PANTOPRAZOLE SODIUM 40 MG: 40 TABLET, DELAYED RELEASE ORAL at 05:46

## 2024-09-03 RX ADMIN — METRONIDAZOLE 500 MG: 250 TABLET ORAL at 14:16

## 2024-09-03 RX ADMIN — HYDRALAZINE HYDROCHLORIDE 25 MG: 25 TABLET ORAL at 14:15

## 2024-09-03 RX ADMIN — SODIUM CHLORIDE, PRESERVATIVE FREE 10 ML: 5 INJECTION INTRAVENOUS at 09:33

## 2024-09-03 RX ADMIN — GABAPENTIN 100 MG: 100 CAPSULE ORAL at 14:16

## 2024-09-03 ASSESSMENT — PAIN DESCRIPTION - LOCATION
LOCATION: LEG;FOOT
LOCATION: BACK
LOCATION: FOOT;LEG
LOCATION: LEG;FOOT
LOCATION: FOOT;LEG
LOCATION: LEG;FOOT

## 2024-09-03 ASSESSMENT — PAIN DESCRIPTION - ORIENTATION
ORIENTATION: LEFT
ORIENTATION: OTHER (COMMENT)
ORIENTATION: LEFT

## 2024-09-03 ASSESSMENT — PAIN DESCRIPTION - DESCRIPTORS
DESCRIPTORS: STABBING;THROBBING;BURNING
DESCRIPTORS: BURNING;STABBING;THROBBING
DESCRIPTORS: SHOOTING;BURNING;THROBBING
DESCRIPTORS: ACHING
DESCRIPTORS: BURNING;THROBBING;SHOOTING
DESCRIPTORS: BURNING;THROBBING;SHOOTING

## 2024-09-03 ASSESSMENT — PAIN - FUNCTIONAL ASSESSMENT
PAIN_FUNCTIONAL_ASSESSMENT: PREVENTS OR INTERFERES SOME ACTIVE ACTIVITIES AND ADLS
PAIN_FUNCTIONAL_ASSESSMENT: PREVENTS OR INTERFERES SOME ACTIVE ACTIVITIES AND ADLS
PAIN_FUNCTIONAL_ASSESSMENT: ACTIVITIES ARE NOT PREVENTED
PAIN_FUNCTIONAL_ASSESSMENT: PREVENTS OR INTERFERES SOME ACTIVE ACTIVITIES AND ADLS
PAIN_FUNCTIONAL_ASSESSMENT: PREVENTS OR INTERFERES SOME ACTIVE ACTIVITIES AND ADLS

## 2024-09-03 ASSESSMENT — PAIN SCALES - GENERAL
PAINLEVEL_OUTOF10: 8
PAINLEVEL_OUTOF10: 7
PAINLEVEL_OUTOF10: 10
PAINLEVEL_OUTOF10: 2
PAINLEVEL_OUTOF10: 7

## 2024-09-03 ASSESSMENT — PAIN DESCRIPTION - FREQUENCY: FREQUENCY: CONTINUOUS

## 2024-09-03 ASSESSMENT — PAIN DESCRIPTION - ONSET: ONSET: ON-GOING

## 2024-09-03 NOTE — PLAN OF CARE
Problem: Discharge Planning  Goal: Discharge to home or other facility with appropriate resources  9/3/2024 1149 by Belkys Francisco RN  Outcome: Progressing  9/3/2024 0241 by Eden Galvez RN  Outcome: Progressing     Problem: Pain  Goal: Verbalizes/displays adequate comfort level or baseline comfort level  9/3/2024 1149 by Belkys Francisco RN  Outcome: Progressing  9/3/2024 0241 by Eden Galvez RN  Outcome: Progressing     Problem: Safety - Adult  Goal: Free from fall injury  9/3/2024 1149 by Belkys Francisco RN  Outcome: Progressing  9/3/2024 0241 by Eden Galvez RN  Outcome: Progressing     Problem: Chronic Conditions and Co-morbidities  Goal: Patient's chronic conditions and co-morbidity symptoms are monitored and maintained or improved  9/3/2024 1149 by Belkys Francisco RN  Outcome: Progressing  9/3/2024 0241 by Eden Galvez RN  Outcome: Progressing     Problem: ABCDS Injury Assessment  Goal: Absence of physical injury  Outcome: Progressing     Problem: Skin/Tissue Integrity - Adult  Goal: Incisions, wounds, or drain sites healing without S/S of infection  9/3/2024 1149 by Belkys Francisco RN  Outcome: Progressing  9/3/2024 0241 by Eden Galvez RN  Outcome: Progressing     Problem: Gastrointestinal - Adult  Goal: Minimal or absence of nausea and vomiting  9/3/2024 1149 by Belkys Francisco RN  Outcome: Progressing  9/3/2024 0241 by Eden Galvez RN  Outcome: Progressing     Problem: Infection - Adult  Goal: Absence of infection at discharge  9/3/2024 1149 by Belkys Francisco RN  Outcome: Progressing  9/3/2024 0241 by Eden Galvez RN  Outcome: Progressing  Goal: Absence of infection during hospitalization  9/3/2024 1149 by Belkys Francisco RN  Outcome: Progressing  9/3/2024 0241 by Eden Galvez RN  Outcome: Progressing     Problem: Metabolic/Fluid and Electrolytes - Adult  Goal: Electrolytes maintained within normal limits  9/3/2024 1149 by Maryam  ROBIN Rizvi  Outcome: Progressing  9/3/2024 0241 by Eden Galvez RN  Outcome: Progressing  Goal: Hemodynamic stability and optimal renal function maintained  9/3/2024 1149 by Belkys Francisco RN  Outcome: Progressing  9/3/2024 0241 by Eden Galvez RN  Outcome: Progressing  Goal: Glucose maintained within prescribed range  9/3/2024 1149 by Belkys Francisco RN  Outcome: Progressing  9/3/2024 0241 by Eden Galvez RN  Outcome: Progressing

## 2024-09-03 NOTE — PROGRESS NOTES
09/03/24 1503   Encounter Summary   Encounter Overview/Reason Follow-up   Service Provided For Patient   Referral/Consult From [facility]   Support System Family members   Last Encounter  09/03/24  (Follow-up visit, PT expressed feeling ok today, no concerns at the moment, he finds meaning in God and appreciates the prayers, calm and coping.)   Complexity of Encounter Low   Begin Time 1458   End Time  1504   Total Time Calculated 6 min   Spiritual/Emotional needs   Type Spiritual Support   Assessment/Intervention/Outcome   Assessment Calm;Concerns with suffering;Hopeful;Stress overload   Intervention Active listening;Discussed illness injury and it’s impact;Discussed relationship with God;Prayer (assurance of)/[place];Sustaining Presence/Ministry of presence   Outcome Comfort;Coping;Expressed feelings, needs, and concerns;Expressed Gratitude   Plan and Referrals   Plan/Referrals Continue Support (comment)

## 2024-09-03 NOTE — CARE COORDINATION
CM in to see Pt to follow up on discharge planning.  Plan remains home with [facility].  Pt denies any needs at this time.  CM following

## 2024-09-03 NOTE — DISCHARGE SUMMARY
V2.0  Discharge Summary    Name:  Yovanny Levine /Age/Sex: 1975 (49 y.o. male)   Admit Date: 2024  Discharge Date: 9/3/24    MRN & CSN:  [id number] & [id number] Encounter Date and Time 9/3/24 3:43 PM EDT    Attending:  Milan Ramirez MD Discharging Provider: Milan Ramirez MD       Hospital Course:     Brief HPI: Yovanny Levine is a 49 y.o. male who presented with left foot cellulitis.    Discharge plan and medication discussed with ID and nephrology. Patient to be discharged on IV vancomycin for a total of six weeks. IV. Vancomycin to be dosed with dialysis on Monday, Wednesday and Friday. Nephrology contacted and signed off on the plan.  Patient also to follow-up with wound care clinic.    Brief Problem Based Course:.   Left foot cellulitis, w osteomyelitis of cuboid, lateral cunfeiform and proximal fourth metatarsal. Came w worsening l foot pain. Left foot x-ray showing signs of lateral midfoot ulceration concerning for possible osteomyelitis.  MRI confirmed diagnosis  - ID consulted  - now on vanc+cefepime+flagyl  - f/u blood cx ; now growing MRSA   - pain control  - general surgery consulted, appreciate recs.  Attempt to salvage foot     MRSA bacteremia  Blood cx 2024  MRSA  - on vancomycin as above  - repeat blood cxX2 from  NGTD, growing light growth of MRSA in wound culture  - ECHO limited ordered, EF 50%, no evidence of endocarditis  - ID following, patient cleared for discharge on IV vancomycin to be dosed during dialysis.     ESRD on hemodialysis   - nephrology has been consulted  - HD as per nephrology     History of chronic left-sided foot osteomyelitis   Has wound VAC in place since last admission.     - plan as above  - consulted general surgery as above  - wound care consulted, appreciate recs     Coronary artery disease with history of PCI and stenting to LAD and LCx continue Plavix statin and Coreg     Diabetes mellitus insulin-dependent   Resume home medications on  prescription for each of these medications  vancomycin infusion        Objective Findings at Discharge:   BP (!) 146/90   Pulse 76   Temp 97.9 °F (36.6 °C) (Oral)   Resp 20   Ht 1.753 m (5' 9\")   Wt 115.7 kg (255 lb)   SpO2 95%   BMI 37.66 kg/m²       Physical Exam:   Physical Exam  Vitals and nursing note reviewed.   Constitutional:       General: He is not in acute distress.     Appearance: He is obese. He is not ill-appearing.   HENT:      Head: Normocephalic and atraumatic.      Mouth/Throat:      Mouth: Mucous membranes are moist.   Eyes:      Extraocular Movements: Extraocular movements intact.      Pupils: Pupils are equal, round, and reactive to light.   Cardiovascular:      Rate and Rhythm: Normal rate and regular rhythm.      Pulses: Normal pulses.      Heart sounds: Normal heart sounds.   Pulmonary:      Effort: Pulmonary effort is normal.      Breath sounds: Normal breath sounds.   Abdominal:      Palpations: Abdomen is soft.   Musculoskeletal:         General: No tenderness or signs of injury. Normal range of motion.      Comments: left foot w wound vac, status post toe amputation   Skin:     General: Skin is warm.      Capillary Refill: Capillary refill takes less than 2 seconds.   Neurological:      General: No focal deficit present.      Mental Status: He is alert and oriented to person, place, and time.   Psychiatric:         Mood and Affect: Mood normal.         Behavior: Behavior normal.         Thought Content: Thought content normal.         Judgment: Judgment normal.              Labs and Imaging   Vascular duplex lower extremity venous left    Result Date: 8/27/2024  Left lower extremity venous ultrasound INDICATION:  Pain and swelling. Left leg pain COMPARISON:  None Doppler ultrasound of the left lower extremity was performed. FINDINGS:  There is normal flow in the great saphenous, common femoral, femoral, and popliteal veins. Normal compression and augmentation is demonstrated. The  last 72 hours.  BNP: No results for input(s): \"PROBNP\" in the last 72 hours.  UA:  Lab Results   Component Value Date/Time    NITRU NEGATIVE 08/28/2024 03:09 AM    COLORU YELLOW 08/28/2024 03:09 AM    PHUR 7.0 08/28/2024 03:09 AM    PHUR 6.5 06/20/2013 09:51 AM    WBCUA 3 08/28/2024 03:09 AM    RBCUA 1 08/28/2024 03:09 AM    MUCUS RARE 07/24/2022 11:40 PM    TRICHOMONAS NONE SEEN 08/28/2024 03:09 AM    BACTERIA RARE 08/28/2024 03:09 AM    CLARITYU CLEAR 08/28/2024 03:09 AM    SPECGRAV 1.025 06/20/2013 09:51 AM    LEUKOCYTESUR NEGATIVE 08/28/2024 03:09 AM    UROBILINOGEN 1.0 08/28/2024 03:09 AM    BILIRUBINUR NEGATIVE 08/28/2024 03:09 AM    BLOODU TRACE 08/28/2024 03:09 AM    GLUCOSEU 250 08/28/2024 03:09 AM    KETUA NEGATIVE 08/28/2024 03:09 AM    AMORPHOUS RARE 07/24/2022 11:40 PM     Urine Cultures: No results found for: \"LABURIN\"  Blood Cultures: No results found for: \"BC\"  No results found for: \"BLOODCULT2\"  Organism:   Lab Results   Component Value Date/Time    ORG SAUR 01/07/2019 12:08 AM       Time Spent Discharging patient 35 minutes    Electronically signed by Milan Ramirez MD on 9/3/2024 at 3:43 PM

## 2024-09-03 NOTE — PROGRESS NOTES
Removed patient's IV and disconnected patient's hospital wound vac. Reconnected to portable vac for home health care use. Provided discharge paperwork and informed patient that he will receive home health care and antibiotics at dialysis [facility].     Harley Figueroa RN, informed this nurse that she would set up the home health care plan and stated that Dr. Dana James is aware of antibiotic use.

## 2024-09-03 NOTE — PLAN OF CARE
Problem: Discharge Planning  Goal: Discharge to home or other facility with appropriate resources  9/3/2024 0241 by Eden Galvez RN  Outcome: Progressing     Problem: Pain  Goal: Verbalizes/displays adequate comfort level or baseline comfort level  9/3/2024 0241 by Eden Galvez RN  Outcome: Progressing     Problem: Safety - Adult  Goal: Free from fall injury  9/3/2024 0241 by Eden Galvez RN  Outcome: Progressing     Problem: Chronic Conditions and Co-morbidities  Goal: Patient's chronic conditions and co-morbidity symptoms are monitored and maintained or improved  Outcome: Progressing     Problem: Skin/Tissue Integrity - Adult  Goal: Incisions, wounds, or drain sites healing without S/S of infection  9/3/2024 0241 by Eden Galvez RN  Outcome: Progressing     Problem: Gastrointestinal - Adult  Goal: Minimal or absence of nausea and vomiting  9/3/2024 0241 by Eden Galvez RN  Outcome: Progressing     Problem: Infection - Adult  Goal: Absence of infection at discharge  9/3/2024 0241 by Eden Galvez RN  Outcome: Progressing     Problem: Infection - Adult  Goal: Absence of infection during hospitalization  9/3/2024 0241 by Eden Galvez RN  Outcome: Progressing     Problem: Metabolic/Fluid and Electrolytes - Adult  Goal: Electrolytes maintained within normal limits  9/3/2024 0241 by Eden Galvez RN  Outcome: Progressing     Problem: Metabolic/Fluid and Electrolytes - Adult  Goal: Hemodynamic stability and optimal renal function maintained  9/3/2024 0241 by Eden Galvez RN  Outcome: Progressing     Problem: Metabolic/Fluid and Electrolytes - Adult  Goal: Glucose maintained within prescribed range  9/3/2024 0241 by Eden Galvez RN  Outcome: Progressing

## 2024-09-03 NOTE — PROGRESS NOTES
Infectious Disease Progress Note  9/3/2024   Patient Name: Yovanny Levine : 1975     Assessment  Left foot diabetic ulcer MRSA osteomyelitis  2024: Status post left fifth toe metatarsal amputation, excisional debridement of diabetic foot ulcer performed by Dr. TOBIAS Forrest  2023: Amputation of left great toe through mid mid metatarsal with drainage of plantar abscess and debridement of skin and subcutaneous tissue, muscle and fascia, performed by Dr. Hsieh.  Previous wound cultures over the past year have isolated Serratia marcescens, Enterococcus faecium, corynebacterium striatum, Porphyromonas asaccharolyticus/uenonis, Oligella urethralis, Proteus vulgaris, Proteus penneri, Enterococcus faecalis, Finegoldia magna, MSSA  Improved redness. Wound cx: MRSA and Proteus mirabilis  MRSA bacteremia  2024, 1 of 4 bottles.  TTE negative  2024, blood cx 0/2 ngtd  Left foot abscess  ESRD on hemodialysis  Right foot Charcot  Type 2 diabetes mellitus with polyneuropathy  Obesity class II  Comorbid conditions:      Plan  Therapeutic:  Continue intravenous vancomycin (-), treat for 6 weeks (EoT: 10/10/2024) ,  Discontinue cefepime (-), and metronidazole (-).  After discharge the following should be done:  Weekly labs drawn on Monday during the course of treatment  CBC with differential, CMP, ESR, CRP,Vancomycin Trough  IV vancomycin pharmacy to dose  Cathflo for blocked vascular access as needed  Fax results to Attn: [facility]  # [phone]  See in clinic within one week after discharge  Disposition: home on abx  Diagnostic:  F/u:  Other:     Reason for visit: F/u left diabetic ulcer infection  History:?Interval history noted  Denies n/v/d/f or untoward effects of antimicrobials  Physical Exam:  Vital Signs: BP (!) 151/97   Pulse 79   Temp 97.9 °F (36.6 °C) (Oral)   Resp 17   Ht 1.753 m (5' 9\")   Wt 115.7 kg (255 lb)   SpO2 98%   BMI 37.66 kg/m²     Gen:     Nausea and vomiting    Diabetic foot infection (HCC)    Acute renal failure (ARF) (HCC)    Cellulitis    Cellulitis of left arm    Cellulitis, scrotum    Acute epididymitis    Irene gangrene    Recurrent major depressive disorder, in full remission (HCC)    Generalized anxiety disorder    Morbid obesity with body mass index (BMI) of 40.0 to 44.9 in adult (HCC)    Necrotizing fasciitis (HCC)    Syncope    Right groin wound    Ulcer of right groin with fat layer exposed (HCC)    Diabetic ulcer of left midfoot associated with type 2 diabetes mellitus (HCC)    Nephrotic syndrome    Generalized edema    Stage 3b chronic kidney disease (HCC)    Diabetic nephropathy associated with type 2 diabetes mellitus (HCC)    Hypoalbuminemia    Chronic kidney disease, stage IV (severe) (HCC)    FH: CAD (coronary artery disease)    ASCVD (arteriosclerotic cardiovascular disease)    Other proteinuria    Chest pain    Surgical wound dehiscence    CARMEN (acute kidney injury) (HCC)    Anemia    Chest tightness    NSTEMI (non-ST elevated myocardial infarction) (HCC)    Diabetic foot ulcer with osteomyelitis (HCC)    ESRD (end stage renal disease) ([facility])    Problem with vascular access    Acute on chronic respiratory failure with hypoxia (HCC)    CAD (coronary artery disease)    Volume overload    Gangrene (HCC)    Acute osteomyelitis of metatarsal bone of left foot (HCC)    Open wound of plantar aspect of left foot    Diabetic ulcer of left midfoot associated with diabetes mellitus due to underlying condition, with fat layer exposed (HCC)    Open wound of foot, left, sequela    Chronic foot pain, left    Absence of toe of left foot (HCC)    Hyperkalemia    ESRD (end stage renal disease) on dialysis (HCC)    Presence of surgical incision    Diabetic ulcer of toe of right foot associated with type 2 diabetes mellitus, with necrosis of muscle (HCC)    Diabetic ulcer of midfoot associated with type 2 diabetes mellitus, with muscle  involvement without evidence of necrosis (HCC)    Acute pain of left knee    Pain in left lower leg       Active Problems  Principal Problem:    Osteomyelitis (HCC)  Resolved Problems:    * No resolved hospital problems. *              Electronically signed by: Electronically signed by Heber Fleming MD on 9/3/2024 at 12:39 PM

## 2024-09-03 NOTE — PROGRESS NOTES
Spiritual Health Assessment/Progress Note  [facility]    Follow-up,  ,  ,      Name: Yovanny Levine MRN: [id number]    Age: 49 y.o.     Sex: male   Language: English   Yarsanism: Confucianist   Osteomyelitis (HCC)     Date: 9/3/2024            Total Time Calculated: 6 min              Spiritual Assessment began in [facility]        Referral/Consult From: Rounding   Encounter Overview/Reason: Follow-up  Service Provided For: Patient    Nicolasa, Belief, Meaning:   Patient has beliefs or practices that help with coping during difficult times  Family/Friends No family/friends present      Importance and Influence:  Patient has spiritual/personal beliefs that influence decisions regarding their health  Family/Friends no family/friends present    Community:  Patient Other:    Family/Friends Other:      Assessment and Plan of Care:     Patient Interventions include: Affirmed coping skills/support systems  Family/Friends Interventions include: Other:      Patient Plan of Care: Spiritual Care available upon further referral  Family/Friends Plan of Care: No future visits per patient/family request    Electronically signed by OMAR Moran on 9/3/2024 at 3:05 PM

## 2024-09-03 NOTE — CONSULTS
[facility] Contin[facility] Nurse  Consult Note       Yovanny Levine  AGE: 49 y.o.   GENDER: male  : 1975  TODAY'S DATE:  9/3/2024    Subjective:     Reason for Evaluation and Assessment: wound vac dressing change       Yovanny Levine is a 49 y.o. male referred by:   [x] Physician  [] Nursing  [] Other:     Wound Identification:  Wound Type: non-healing surgical  Contributing Factors: diabetes and chronic pressure        PAST MEDICAL HISTORY        Diagnosis Date    Abscess 2010    scrotal    Acute renal failure (ARF) ([facility]) 2019    Anxiety associated with depression     Anxiety associated with depression     Back pain 2012    CAD (coronary artery disease) 02/10/2023    Chest pain 2013    Diabetic nephropathy ([facility])     Diabetic neuropathy ([facility]) 2011    Diabetic ulcer of left midfoot associated with type 2 diabetes mellitus, with fat layer exposed ([facility]) 2017    Diverticulosis     C scope + Dr. Medina    DM (diabetes mellitus), type 2 ([facility])     DR. 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 Medina    Nausea and vomiting 2019    Necrotizing fasciitis ([facility])     Nose fracture 1988    Panic attacks     Pericarditis     Hospitalized with s/p heart cath normal    Peripheral autonomic neuropathy due to diabetes mellitus ([facility])     Axonal EMG- NCS, 2011    Sebaceous cyst 2011    URI (upper respiratory infection) 2012    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 type 2 diabetes mellitus, with muscle involvement without evidence of necrosis ([facility]) 2021    WD-Wound, surgical, infected, initial encounter 2017    Wrist fracture 1986       PAST SURGICAL HISTORY    Past Surgical History:   Procedure Laterality Date     [Metoclopramide] Anxiety       MEDICATIONS    No current facility-administered medications on file prior to encounter.     Current Outpatient Medications on File Prior to Encounter   Medication Sig Dispense Refill    insulin lispro (HUMALOG,ADMELOG) 100 UNIT/ML SOLN injection vial Inject into the skin 3 times daily (with meals) Sliding scale insulin      oxyCODONE-acetaminophen (PERCOCET) 7.5-325 MG per tablet Take 1 tablet by mouth every 8 hours as needed for Pain for up to 14 days. Intended supply: 14 days Max Daily Amount: 3 tablets 42 tablet 0    isosorbide mononitrate (IMDUR) 30 MG extended release tablet Take 1 tablet by mouth daily (Patient not taking: Reported on 8/27/2024) 30 tablet 3    amiodarone (CORDARONE) 200 MG tablet Take 1 tablet by mouth daily (Patient not taking: Reported on 8/27/2024) 30 tablet 1    clopidogrel (PLAVIX) 75 MG tablet Take 1 tablet by mouth daily (Patient not taking: Reported on 8/27/2024) 30 tablet 3    Wound Cleansers (VASHE WOUND THERAPY) external solution During wound care 475 mL 0    albuterol sulfate HFA (VENTOLIN HFA) 108 (90 Base) MCG/ACT inhaler Inhale 2 puffs into the lungs 4 times daily as needed for Wheezing 18 g 0    insulin glargine (LANTUS SOLOSTAR) 100 UNIT/ML injection pen Inject 10 Units into the skin nightly 3 Adjustable Dose Pre-filled Pen Syringe 3    hydrALAZINE (APRESOLINE) 25 MG tablet Take 1 tablet by mouth every 8 hours (Patient taking differently: Take 1 tablet by mouth in the morning and at bedtime) 90 tablet 3    amLODIPine (NORVASC) 5 MG tablet Take 1 tablet by mouth daily (Patient not taking: Reported on 8/27/2024) 30 tablet 3    atorvastatin (LIPITOR) 40 MG tablet Take 1 tablet by mouth nightly (Patient not taking: Reported on 8/27/2024) 30 tablet 3    [DISCONTINUED] metoprolol tartrate (LOPRESSOR) 25 MG tablet Take 0.5 tablets by mouth 2 times daily 30 tablet 0    [DISCONTINUED] furosemide (LASIX) 40 MG tablet Take 1 tablet by mouth daily 30 tablet    Undermining Maxium Distance (cm) 0 09/03/24 0900   Wound Assessment Pale granulation tissue;Pink/red 09/03/24 0900   Drainage Amount Scant (moist but unmeasurable) 09/03/24 0900   Drainage Description Serosanguinous 09/03/24 0900   Odor Mild 09/03/24 0900   Mao-wound Assessment Hyperkeratosis (callous);Maceration 09/03/24 0900   Margins Defined edges 09/03/24 0900   Wound Thickness Description not for Pressure Injury Full thickness 09/03/24 0900   Number of days: 195       Response to treatment:  Well tolerated by patient.     Pain Assessment:  Severity:  none  Quality of pain: na  Wound Pain Timing/Severity: na  Premedicated: none    Plan:     Plan of Care: Wound 02/20/24 Wound #1 Left Lateral Distal Foot-Dressing/Treatment: Negative pressure wound therapy    Alert and agreeable to wound vac dressing change. Left lateral foot with healing noted to mao wound, covered with opticell ag and drape. Stimulin to wound bed then covered with Black foam to wound bed and covered with drape. To continuous suction at 125 MMHG. Wrapped with ace wrap per pt request. [facility].     Specialty Bed Required : none  [] Low Air Loss   [] Pressure Redistribution  [] Fluid Immersion  [] Bariatric  [] Total Pressure Relief  [] Other:     Discharge Plan:  Placement for patient upon discharge: to be determined   Hospice Care: not at this time   Patient appropriate for Outpatient Wound Care Center: is current pt    Patient/Caregiver Teaching:  Level of patient/caregiver understanding able to:   Voiced understanding        Electronically signed by Dawn Kirkland RN,  on 9/3/2024 at 1:22 PM

## 2024-09-03 NOTE — PROGRESS NOTES
Outpatient Pharmacy Progress Note for Meds-to-Beds    Total number of Prescriptions Filled: 2    Additional Documentation:  Medications given to nurse Jamila to provide to patient  The following prescription(s) were not covered under the patient's insurance because they can be purchased as OTC medications: Senna  The calcitriol requires a PA      Thank you for letting [place] [address]    Phone: [phone]    Fax: [phone]

## 2024-09-03 NOTE — CARE COORDINATION
Dr Ramirez stated the IV vanco is to be completed with Dialysis and Dr James will take care of the atbs & pt does not need any IV access. Also  stated to speak with Starla on when the next vac change is do Wound order states M-W-F and [facility] order stated 9/6 was the next vac change. Dr Cha states to wait until 9/6 since the vac was changed today. Spoke with nurse Terrazas with update on my conversations with both MD's.

## 2024-09-03 NOTE — PROGRESS NOTES
Nephrology Progress Note        [address], [address]  [place] [address]  Phone: [phone]  Office Hours: 8:30AM - 4:30PM  Monday - Friday        9/3/2024 7:12 AM  Subjective:   Admit Date: 8/27/2024  PCP: José Luis Izquierdo MD  Interval History:   On NC  BP seems better    Diet: ADULT DIET; Regular; 3 carb choices (45 gm/meal); No Added Salt (3-4 gm); Low Potassium (Less than 3000 mg/day); 1500 ml      Data:   Scheduled Meds:   isosorbide mononitrate  60 mg Oral Daily    pantoprazole  40 mg Oral QAM AC    insulin glargine  0.25 Units/kg SubCUTAneous Nightly    insulin lispro  0.05 Units/kg SubCUTAneous TID WC    insulin lispro  0-4 Units SubCUTAneous TID WC    insulin lispro  0-4 Units SubCUTAneous Nightly    polyethylene glycol  17 g Oral Daily    acetaminophen  1,000 mg Oral TID    gabapentin  100 mg Oral TID    senna  1 tablet Oral Nightly    calcium carbonate  1,000 mg Oral TID WC    metroNIDAZOLE  500 mg Oral 3 times per day    calcitRIOL  1 mcg Oral Daily    sevelamer  800 mg Oral TID WC    amiodarone  200 mg Oral Daily    amLODIPine  5 mg Oral Daily    atorvastatin  40 mg Oral Nightly    clopidogrel  75 mg Oral Daily    hydrALAZINE  25 mg Oral 3 times per day    sodium chloride flush  5-40 mL IntraVENous 2 times per day    epoetin paola-epbx  10,000 Units IntraVENous Once per day on Monday Wednesday Friday    cefepime  1,000 mg IntraVENous Q24H    heparin (porcine)  5,000 Units SubCUTAneous 3 times per day    vancomycin (VANCOCIN) intermittent dosing (placeholder)   Other RX Placeholder     Continuous Infusions:   dextrose      sodium chloride 10 mL/hr at 09/02/24 2237     PRN Meds:diphenhydrAMINE, dermaphor, glucose, dextrose bolus **OR** dextrose bolus, glucagon (rDNA), dextrose, oxyCODONE **OR** oxyCODONE, sodium chloride flush, sodium chloride, ondansetron **OR** ondansetron  I/O last 3 completed shifts:  In: 5980 [P.O.:480; I.V.:5000]  Out: 4150 [Urine:150]  No intake/output data  recorded.    Intake/Output Summary (Last 24 hours) at 9/3/2024 0712  Last data filed at 9/2/2024 1156  Gross per 24 hour   Intake 5500 ml   Output 4050 ml   Net 1450 ml           Objective:   Vitals: BP (!) 154/94   Pulse 79   Temp 97.6 °F (36.4 °C) (Oral)   Resp 23   Ht 1.753 m (5' 9\")   Wt 115.7 kg (255 lb)   SpO2 97%   BMI 37.66 kg/m²   General appearance: alert and cooperative with exam, in no acute distress  HEENT: normocephalic, atraumatic,   Neck: supple, trachea midline  Lungs: breathing comfortably on NC  Heart:: regular rate and rhythm,   Extremities: no BLE edema  Neurologic: alert, oriented, follows commands, interactive    MEDICAL DECISION MAKING     -Left foot osteo; recurrent//MRI left foot showed on 8/29/24: Osteomyelitis of the cuboid, lateral cuneiform and proximal fourth metatarsal.///Tenosynovitis of tibialis posterior, flexor hallucis longus,, peroneus brevis and longus tendon sheaths with tendon remaining intact.//Moderate cellulitis//Draining abscess at the lateral midfoot adjacent to cuboid and fourth metatarsal///Amputation of fifth metatarsal and proximal and mid first metatarsal  -Hyponatremia  -Hypocalcemia   -ESRD on HD MWF  -Anemia of ESRD  -CKD-MBD  -GERD     Suggest;  -HD planned wedn  -Keep on low K diet  -Phos binders and calcitriol to be continued  -Retacrit in HD for Anemia mgmt  -The fistula arm is only to be touched by the HD nurse please, no BP cuff, no IV ACcess in that arm  -BP monitoring, goal //                           Electronically signed by Melody James DO on 9/3/2024 at 7:12 AM    [facility]  MD SAMANTHA SMITH DO  [address] [facility],  [address] [place] [address]  PHONE: [phone]  FAX: [phone]

## 2024-09-03 NOTE — CONSULTS
PHARMACY VANCOMYCIN MONITORING SERVICE  Pharmacy consulted by Earnest Shafer  for monitoring and adjustment.    Indication for treatment: Vancomycin indication: Bone/Joint infection   Goal trough: pre-Hd - 21-24  AUC/PAMELA: 400-600    Risk Factors for MRSA Identified:   Hospitalization within the past 90 days, Received IV antibiotics within the past 90 days, Patient on hemodialysis    Pertinent Laboratory Values:   Temp Readings from Last 3 Encounters:   09/03/24 97.9 °F (36.6 °C) (Oral)   08/27/24 98.5 °F (36.9 °C) (Temporal)   08/13/24 98 °F (36.7 °C) (Temporal)     No results for input(s): \"WBC\", \"LACTATE\" in the last 72 hours.    No results for input(s): \"BUN\", \"CREATININE\" in the last 72 hours.    Estimated Creatinine Clearance: 15 mL/min (A) (based on SCr of 7.7 mg/dL (H)).    Intake/Output Summary (Last 24 hours) at 9/3/2024 1253  Last data filed at 9/3/2024 0933  Gross per 24 hour   Intake 10 ml   Output --   Net 10 ml     Last Encounter Weight:  Wt Readings from Last 3 Encounters:   08/30/24 115.7 kg (255 lb)   06/12/24 125.5 kg (276 lb 10.8 oz)   05/22/24 117.9 kg (260 lb)       Pertinent Cultures:   Date    Source    Results  8/27   Blood    1/4 MRSA +  8/27   Urine                         No Growth  8/30                            Wound                                    MRSA+  8/30                            Blood                                    NG x48 hours         VANCOMYCIN TROUGH:  No results for input(s): \"VANCOTROUGH\" in the last 72 hours.  VANCOMYCIN RANDOM:    Recent Labs     09/02/24  0515   VANCORANDOM 22.4       Assessment:  HPI: Intractable left foot pain with left foot x-ray showing signs of lateral midfoot ulceration concerning for possible osteomyelitis, Diabetes mellitus, ESRD on hemodialysis, anemia of ESRD and Chronic pain disorder. Now blood culture in 1/4 positive for MRSA. Wound culture positive for MRSA.  SCr, BUN, and urine output: ESRD on hemodialysis on M/W/F, Urine  negligible   Day(s) of therapy: 7   (ID following)  Vancomycin concentration:   8/28: 19.8 mg/L (pre-HD level)  8/30: 17 mg/L (pre-HD level)  9/2:  22.4 mg/L (pre-HD level, ok to re-dose)    Plan:  No Dose, nor level today.   Next level ordered for Wednesday pre-HD  Pharmacy will continue to monitor patient and adjust therapy as indicated    VANCOMYCIN CONCENTRATION SCHEDULED FOR 09/04 @ 0600    Thank you for the consult.  Lovely Saeed RPH  9/3/2024 12:53 PM

## 2024-09-04 LAB
CULTURE: ABNORMAL
CULTURE: NORMAL
CULTURE: NORMAL
Lab: ABNORMAL
Lab: NORMAL
Lab: NORMAL
SPECIMEN: ABNORMAL
SPECIMEN: NORMAL
SPECIMEN: NORMAL

## 2024-09-06 PROBLEM — M86.672 CHRONIC REFRACTORY OSTEOMYELITIS OF FOOT, LEFT (HCC): Status: ACTIVE | Noted: 2024-09-06

## 2024-09-10 ENCOUNTER — HOSPITAL ENCOUNTER (OUTPATIENT)
Dept: WOUND CARE | Age: 49
Discharge: HOME | End: 2024-09-10
Attending: STUDENT IN AN ORGANIZED HEALTH CARE EDUCATION/TRAINING PROGRAM
Payer: MEDICARE

## 2024-09-10 VITALS
RESPIRATION RATE: 20 BRPM | TEMPERATURE: 98.4 F | HEART RATE: 91 BPM | DIASTOLIC BLOOD PRESSURE: 85 MMHG | SYSTOLIC BLOOD PRESSURE: 146 MMHG

## 2024-09-10 DIAGNOSIS — L97.425 DIABETIC ULCER OF LEFT MIDFOOT ASSOCIATED WITH TYPE 2 DIABETES MELLITUS, WITH MUSCLE INVOLVEMENT WITHOUT EVIDENCE OF NECROSIS (HCC): ICD-10-CM

## 2024-09-10 DIAGNOSIS — E13.621: ICD-10-CM

## 2024-09-10 DIAGNOSIS — Z89.422 ABSENCE OF TOE OF LEFT FOOT (HCC): Primary | ICD-10-CM

## 2024-09-10 DIAGNOSIS — M86.672 CHRONIC REFRACTORY OSTEOMYELITIS OF FOOT, LEFT (HCC): ICD-10-CM

## 2024-09-10 DIAGNOSIS — M79.662 PAIN IN LEFT LOWER LEG: ICD-10-CM

## 2024-09-10 DIAGNOSIS — E11.621 DIABETIC ULCER OF LEFT MIDFOOT ASSOCIATED WITH TYPE 2 DIABETES MELLITUS, WITH MUSCLE INVOLVEMENT WITHOUT EVIDENCE OF NECROSIS (HCC): ICD-10-CM

## 2024-09-10 DIAGNOSIS — L97.425: ICD-10-CM

## 2024-09-10 PROCEDURE — 97605 NEG PRS WND THER DME<=50SQCM: CPT

## 2024-09-10 PROCEDURE — 11042 DBRDMT SUBQ TIS 1ST 20SQCM/<: CPT

## 2024-09-10 RX ORDER — MUPIROCIN 20 MG/G
OINTMENT TOPICAL ONCE
OUTPATIENT
Start: 2024-09-10 | End: 2024-09-10

## 2024-09-10 RX ORDER — LIDOCAINE HYDROCHLORIDE 20 MG/ML
JELLY TOPICAL ONCE
OUTPATIENT
Start: 2024-09-10 | End: 2024-09-10

## 2024-09-10 RX ORDER — GENTAMICIN SULFATE 1 MG/G
OINTMENT TOPICAL ONCE
OUTPATIENT
Start: 2024-09-10 | End: 2024-09-10

## 2024-09-10 RX ORDER — NEOMYCIN/BACITRACIN/POLYMYXINB 3.5-400-5K
OINTMENT (GRAM) TOPICAL ONCE
OUTPATIENT
Start: 2024-09-10 | End: 2024-09-10

## 2024-09-10 RX ORDER — HYDROCODONE BITARTRATE AND ACETAMINOPHEN 10; 325 MG/1; MG/1
1 TABLET ORAL EVERY 6 HOURS PRN
Qty: 56 TABLET | Refills: 0 | Status: SHIPPED | OUTPATIENT
Start: 2024-09-10 | End: 2024-09-24

## 2024-09-10 RX ORDER — LIDOCAINE 50 MG/G
OINTMENT TOPICAL ONCE
OUTPATIENT
Start: 2024-09-10 | End: 2024-09-10

## 2024-09-10 RX ORDER — OXYCODONE AND ACETAMINOPHEN 10; 325 MG/1; MG/1
1 TABLET ORAL EVERY 6 HOURS PRN
Qty: 56 TABLET | Refills: 0 | Status: SHIPPED | OUTPATIENT
Start: 2024-09-10 | End: 2024-09-24

## 2024-09-10 RX ORDER — BACITRACIN ZINC AND POLYMYXIN B SULFATE 500; 1000 [USP'U]/G; [USP'U]/G
OINTMENT TOPICAL ONCE
OUTPATIENT
Start: 2024-09-10 | End: 2024-09-10

## 2024-09-10 RX ORDER — TRIAMCINOLONE ACETONIDE 1 MG/G
OINTMENT TOPICAL ONCE
OUTPATIENT
Start: 2024-09-10 | End: 2024-09-10

## 2024-09-10 RX ORDER — BETAMETHASONE DIPROPIONATE 0.5 MG/G
CREAM TOPICAL ONCE
OUTPATIENT
Start: 2024-09-10 | End: 2024-09-10

## 2024-09-10 RX ORDER — LIDOCAINE 40 MG/G
CREAM TOPICAL ONCE
OUTPATIENT
Start: 2024-09-10 | End: 2024-09-10

## 2024-09-10 RX ORDER — BACITRACIN ZINC 500 [USP'U]/G
OINTMENT TOPICAL ONCE
OUTPATIENT
Start: 2024-09-10 | End: 2024-09-10

## 2024-09-10 RX ORDER — CLOBETASOL PROPIONATE 0.5 MG/G
OINTMENT TOPICAL ONCE
OUTPATIENT
Start: 2024-09-10 | End: 2024-09-10

## 2024-09-10 RX ORDER — SILVER SULFADIAZINE 10 MG/G
CREAM TOPICAL ONCE
OUTPATIENT
Start: 2024-09-10 | End: 2024-09-10

## 2024-09-10 RX ORDER — SODIUM CHLOR/HYPOCHLOROUS ACID 0.033 %
SOLUTION, IRRIGATION IRRIGATION ONCE
OUTPATIENT
Start: 2024-09-10 | End: 2024-09-10

## 2024-09-10 RX ORDER — LIDOCAINE HYDROCHLORIDE 40 MG/ML
SOLUTION TOPICAL ONCE
OUTPATIENT
Start: 2024-09-10 | End: 2024-09-10

## 2024-09-10 ASSESSMENT — PAIN DESCRIPTION - DESCRIPTORS: DESCRIPTORS: SHARP;SHOOTING

## 2024-09-10 ASSESSMENT — PAIN DESCRIPTION - FREQUENCY: FREQUENCY: CONTINUOUS

## 2024-09-10 ASSESSMENT — PAIN - FUNCTIONAL ASSESSMENT: PAIN_FUNCTIONAL_ASSESSMENT: PREVENTS OR INTERFERES SOME ACTIVE ACTIVITIES AND ADLS

## 2024-09-10 ASSESSMENT — PAIN DESCRIPTION - LOCATION: LOCATION: FOOT

## 2024-09-10 ASSESSMENT — PAIN DESCRIPTION - ORIENTATION: ORIENTATION: LEFT

## 2024-09-10 ASSESSMENT — PAIN DESCRIPTION - ONSET: ONSET: ON-GOING

## 2024-09-10 ASSESSMENT — PAIN SCALES - GENERAL: PAINLEVEL_OUTOF10: 9

## 2024-09-10 ASSESSMENT — PAIN DESCRIPTION - PAIN TYPE: TYPE: SURGICAL PAIN

## 2024-09-16 ENCOUNTER — HOSPITAL ENCOUNTER (OUTPATIENT)
Dept: HYPERBARIC MEDICINE | Age: 49
Discharge: HOME | End: 2024-09-16
Payer: MEDICARE

## 2024-09-16 VITALS
RESPIRATION RATE: 16 BRPM | DIASTOLIC BLOOD PRESSURE: 88 MMHG | HEART RATE: 76 BPM | TEMPERATURE: 98 F | SYSTOLIC BLOOD PRESSURE: 154 MMHG

## 2024-09-16 DIAGNOSIS — L97.415 DIABETIC ULCER OF RIGHT MIDFOOT ASSOCIATED WITH TYPE 2 DIABETES MELLITUS, WITH MUSCLE INVOLVEMENT WITHOUT EVIDENCE OF NECROSIS (HCC): ICD-10-CM

## 2024-09-16 DIAGNOSIS — M86.672 CHRONIC REFRACTORY OSTEOMYELITIS OF FOOT, LEFT (HCC): Primary | ICD-10-CM

## 2024-09-16 DIAGNOSIS — E11.621 DIABETIC ULCER OF RIGHT MIDFOOT ASSOCIATED WITH TYPE 2 DIABETES MELLITUS, WITH MUSCLE INVOLVEMENT WITHOUT EVIDENCE OF NECROSIS (HCC): ICD-10-CM

## 2024-09-16 PROCEDURE — 99183 HYPERBARIC OXYGEN THERAPY: CPT | Performed by: NURSE PRACTITIONER

## 2024-09-16 PROCEDURE — G0277 HBOT, FULL BODY CHAMBER, 30M: HCPCS

## 2024-09-16 PROCEDURE — 82962 GLUCOSE BLOOD TEST: CPT

## 2024-09-16 ASSESSMENT — PAIN DESCRIPTION - ONSET
ONSET: ON-GOING
ONSET: ON-GOING

## 2024-09-16 ASSESSMENT — PAIN DESCRIPTION - FREQUENCY
FREQUENCY: CONTINUOUS
FREQUENCY: CONTINUOUS

## 2024-09-16 ASSESSMENT — PAIN DESCRIPTION - LOCATION
LOCATION: FOOT;BACK
LOCATION: FOOT;BACK

## 2024-09-16 ASSESSMENT — PAIN DESCRIPTION - PAIN TYPE
TYPE: CHRONIC PAIN
TYPE: CHRONIC PAIN

## 2024-09-16 ASSESSMENT — PAIN SCALES - GENERAL
PAINLEVEL_OUTOF10: 7
PAINLEVEL_OUTOF10: 7

## 2024-09-16 ASSESSMENT — PAIN DESCRIPTION - DESCRIPTORS
DESCRIPTORS: SHARP
DESCRIPTORS: SHARP

## 2024-09-16 ASSESSMENT — PAIN DESCRIPTION - ORIENTATION
ORIENTATION: RIGHT;LEFT
ORIENTATION: RIGHT;LEFT

## 2024-09-17 ENCOUNTER — HOSPITAL ENCOUNTER (OUTPATIENT)
Dept: WOUND CARE | Age: 49
Discharge: HOME | End: 2024-09-17
Attending: STUDENT IN AN ORGANIZED HEALTH CARE EDUCATION/TRAINING PROGRAM

## 2024-09-19 LAB
GLUCOSE BLD-MCNC: 143 MG/DL (ref 74–99)
GLUCOSE BLD-MCNC: 165 MG/DL (ref 74–99)

## 2024-09-23 ENCOUNTER — HOSPITAL ENCOUNTER (OUTPATIENT)
Dept: WOUND CARE | Age: 49
Discharge: HOME | End: 2024-09-23
Attending: STUDENT IN AN ORGANIZED HEALTH CARE EDUCATION/TRAINING PROGRAM
Payer: MEDICARE

## 2024-09-23 ENCOUNTER — HOSPITAL ENCOUNTER (OUTPATIENT)
Dept: HYPERBARIC MEDICINE | Age: 49
Discharge: HOME | End: 2024-09-23
Payer: MEDICARE

## 2024-09-23 VITALS
TEMPERATURE: 97.4 F | RESPIRATION RATE: 24 BRPM | DIASTOLIC BLOOD PRESSURE: 87 MMHG | SYSTOLIC BLOOD PRESSURE: 153 MMHG | HEART RATE: 83 BPM

## 2024-09-23 DIAGNOSIS — M86.672 CHRONIC REFRACTORY OSTEOMYELITIS OF FOOT, LEFT: ICD-10-CM

## 2024-09-23 DIAGNOSIS — E11.621 DIABETIC ULCER OF MIDFOOT ASSOCIATED WITH TYPE 2 DIABETES MELLITUS, WITH MUSCLE INVOLVEMENT WITHOUT EVIDENCE OF NECROSIS, UNSPECIFIED LATERALITY (HCC): ICD-10-CM

## 2024-09-23 DIAGNOSIS — L97.405 DIABETIC ULCER OF MIDFOOT ASSOCIATED WITH TYPE 2 DIABETES MELLITUS, WITH MUSCLE INVOLVEMENT WITHOUT EVIDENCE OF NECROSIS, UNSPECIFIED LATERALITY (HCC): ICD-10-CM

## 2024-09-23 DIAGNOSIS — Z89.422 ABSENCE OF TOE OF LEFT FOOT (HCC): Primary | ICD-10-CM

## 2024-09-23 DIAGNOSIS — M79.662 PAIN IN LEFT LOWER LEG: ICD-10-CM

## 2024-09-23 DIAGNOSIS — M86.672 CHRONIC REFRACTORY OSTEOMYELITIS OF FOOT, LEFT: Primary | ICD-10-CM

## 2024-09-23 DIAGNOSIS — L97.415 DIABETIC ULCER OF RIGHT MIDFOOT ASSOCIATED WITH TYPE 2 DIABETES MELLITUS, WITH MUSCLE INVOLVEMENT WITHOUT EVIDENCE OF NECROSIS (HCC): ICD-10-CM

## 2024-09-23 DIAGNOSIS — E11.21 DIABETIC NEPHROPATHY ASSOCIATED WITH TYPE 2 DIABETES MELLITUS (HCC): ICD-10-CM

## 2024-09-23 DIAGNOSIS — Z99.2 ESRD (END STAGE RENAL DISEASE) ON DIALYSIS (HCC): ICD-10-CM

## 2024-09-23 DIAGNOSIS — E11.621 DIABETIC ULCER OF RIGHT MIDFOOT ASSOCIATED WITH TYPE 2 DIABETES MELLITUS, WITH MUSCLE INVOLVEMENT WITHOUT EVIDENCE OF NECROSIS (HCC): ICD-10-CM

## 2024-09-23 DIAGNOSIS — N18.6 ESRD (END STAGE RENAL DISEASE) ON DIALYSIS (HCC): ICD-10-CM

## 2024-09-23 LAB
GLUCOSE BLD-MCNC: 160 MG/DL (ref 74–99)
GLUCOSE BLD-MCNC: 179 MG/DL (ref 74–99)

## 2024-09-23 PROCEDURE — 11042 DBRDMT SUBQ TIS 1ST 20SQCM/<: CPT | Performed by: NURSE PRACTITIONER

## 2024-09-23 PROCEDURE — 99183 HYPERBARIC OXYGEN THERAPY: CPT | Performed by: NURSE PRACTITIONER

## 2024-09-23 PROCEDURE — 11042 DBRDMT SUBQ TIS 1ST 20SQCM/<: CPT

## 2024-09-23 PROCEDURE — G0277 HBOT, FULL BODY CHAMBER, 30M: HCPCS

## 2024-09-23 PROCEDURE — 11719 TRIM NAIL(S) ANY NUMBER: CPT

## 2024-09-23 PROCEDURE — 82962 GLUCOSE BLOOD TEST: CPT

## 2024-09-23 PROCEDURE — 97605 NEG PRS WND THER DME<=50SQCM: CPT

## 2024-09-23 RX ORDER — GENTAMICIN SULFATE 1 MG/G
OINTMENT TOPICAL ONCE
OUTPATIENT
Start: 2024-09-23 | End: 2024-09-23

## 2024-09-23 RX ORDER — LIDOCAINE HYDROCHLORIDE 40 MG/ML
SOLUTION TOPICAL ONCE
OUTPATIENT
Start: 2024-09-23 | End: 2024-09-23

## 2024-09-23 RX ORDER — NEOMYCIN/BACITRACIN/POLYMYXINB 3.5-400-5K
OINTMENT (GRAM) TOPICAL ONCE
OUTPATIENT
Start: 2024-09-23 | End: 2024-09-23

## 2024-09-23 RX ORDER — SODIUM CHLOR/HYPOCHLOROUS ACID 0.033 %
SOLUTION, IRRIGATION IRRIGATION ONCE
OUTPATIENT
Start: 2024-09-23 | End: 2024-09-23

## 2024-09-23 RX ORDER — BETAMETHASONE DIPROPIONATE 0.5 MG/G
CREAM TOPICAL ONCE
OUTPATIENT
Start: 2024-09-23 | End: 2024-09-23

## 2024-09-23 RX ORDER — SILVER SULFADIAZINE 10 MG/G
CREAM TOPICAL ONCE
OUTPATIENT
Start: 2024-09-23 | End: 2024-09-23

## 2024-09-23 RX ORDER — LIDOCAINE 50 MG/G
OINTMENT TOPICAL ONCE
OUTPATIENT
Start: 2024-09-23 | End: 2024-09-23

## 2024-09-23 RX ORDER — LIDOCAINE HYDROCHLORIDE 20 MG/ML
JELLY TOPICAL ONCE
OUTPATIENT
Start: 2024-09-23 | End: 2024-09-23

## 2024-09-23 RX ORDER — CLOBETASOL PROPIONATE 0.5 MG/G
OINTMENT TOPICAL ONCE
OUTPATIENT
Start: 2024-09-23 | End: 2024-09-23

## 2024-09-23 RX ORDER — BACITRACIN ZINC AND POLYMYXIN B SULFATE 500; 1000 [USP'U]/G; [USP'U]/G
OINTMENT TOPICAL ONCE
OUTPATIENT
Start: 2024-09-23 | End: 2024-09-23

## 2024-09-23 RX ORDER — TRIAMCINOLONE ACETONIDE 1 MG/G
OINTMENT TOPICAL ONCE
OUTPATIENT
Start: 2024-09-23 | End: 2024-09-23

## 2024-09-23 RX ORDER — BACITRACIN ZINC 500 [USP'U]/G
OINTMENT TOPICAL ONCE
OUTPATIENT
Start: 2024-09-23 | End: 2024-09-23

## 2024-09-23 RX ORDER — LIDOCAINE 40 MG/G
CREAM TOPICAL ONCE
OUTPATIENT
Start: 2024-09-23 | End: 2024-09-23

## 2024-09-23 RX ORDER — OXYCODONE AND ACETAMINOPHEN 10; 325 MG/1; MG/1
1 TABLET ORAL EVERY 6 HOURS PRN
Qty: 56 TABLET | Refills: 0 | Status: SHIPPED | OUTPATIENT
Start: 2024-09-23 | End: 2024-10-07

## 2024-09-23 RX ORDER — MUPIROCIN 20 MG/G
OINTMENT TOPICAL ONCE
OUTPATIENT
Start: 2024-09-23 | End: 2024-09-23

## 2024-09-23 ASSESSMENT — PAIN DESCRIPTION - PAIN TYPE
TYPE: CHRONIC PAIN
TYPE: CHRONIC PAIN

## 2024-09-23 ASSESSMENT — PAIN DESCRIPTION - DESCRIPTORS
DESCRIPTORS: OTHER (COMMENT)
DESCRIPTORS: OTHER (COMMENT)

## 2024-09-23 ASSESSMENT — PAIN DESCRIPTION - ONSET
ONSET: ON-GOING
ONSET: ON-GOING

## 2024-09-23 ASSESSMENT — PAIN DESCRIPTION - LOCATION
LOCATION: OTHER (COMMENT)
LOCATION: OTHER (COMMENT)

## 2024-09-23 ASSESSMENT — PAIN DESCRIPTION - FREQUENCY
FREQUENCY: CONTINUOUS
FREQUENCY: CONTINUOUS

## 2024-09-23 ASSESSMENT — PAIN SCALES - GENERAL: PAINLEVEL_OUTOF10: 7

## 2024-09-24 ENCOUNTER — HOSPITAL ENCOUNTER (OUTPATIENT)
Dept: HYPERBARIC MEDICINE | Age: 49
Discharge: HOME | End: 2024-09-24
Payer: MEDICARE

## 2024-09-24 VITALS
DIASTOLIC BLOOD PRESSURE: 88 MMHG | TEMPERATURE: 98.2 F | RESPIRATION RATE: 16 BRPM | HEART RATE: 78 BPM | SYSTOLIC BLOOD PRESSURE: 152 MMHG

## 2024-09-24 DIAGNOSIS — E11.621 DIABETIC ULCER OF LEFT MIDFOOT ASSOCIATED WITH TYPE 2 DIABETES MELLITUS, WITH MUSCLE INVOLVEMENT WITHOUT EVIDENCE OF NECROSIS (HCC): Primary | ICD-10-CM

## 2024-09-24 DIAGNOSIS — M86.672 CHRONIC REFRACTORY OSTEOMYELITIS OF FOOT, LEFT: ICD-10-CM

## 2024-09-24 DIAGNOSIS — L97.415 DIABETIC ULCER OF RIGHT MIDFOOT ASSOCIATED WITH TYPE 2 DIABETES MELLITUS, WITH MUSCLE INVOLVEMENT WITHOUT EVIDENCE OF NECROSIS (HCC): ICD-10-CM

## 2024-09-24 DIAGNOSIS — E11.621 DIABETIC ULCER OF RIGHT MIDFOOT ASSOCIATED WITH TYPE 2 DIABETES MELLITUS, WITH MUSCLE INVOLVEMENT WITHOUT EVIDENCE OF NECROSIS (HCC): ICD-10-CM

## 2024-09-24 DIAGNOSIS — L97.425 DIABETIC ULCER OF LEFT MIDFOOT ASSOCIATED WITH TYPE 2 DIABETES MELLITUS, WITH MUSCLE INVOLVEMENT WITHOUT EVIDENCE OF NECROSIS (HCC): Primary | ICD-10-CM

## 2024-09-24 LAB
GLUCOSE BLD-MCNC: 139 MG/DL (ref 74–99)
GLUCOSE BLD-MCNC: 150 MG/DL (ref 74–99)

## 2024-09-24 PROCEDURE — 82962 GLUCOSE BLOOD TEST: CPT

## 2024-09-24 PROCEDURE — G0277 HBOT, FULL BODY CHAMBER, 30M: HCPCS

## 2024-09-24 ASSESSMENT — PAIN SCALES - GENERAL
PAINLEVEL_OUTOF10: 6
PAINLEVEL_OUTOF10: 6

## 2024-09-24 ASSESSMENT — PAIN DESCRIPTION - ONSET
ONSET: ON-GOING
ONSET: ON-GOING

## 2024-09-24 ASSESSMENT — PAIN DESCRIPTION - LOCATION: LOCATION: FOOT;BACK

## 2024-09-24 ASSESSMENT — PAIN DESCRIPTION - DESCRIPTORS
DESCRIPTORS: SHARP
DESCRIPTORS: SHARP

## 2024-09-24 ASSESSMENT — PAIN DESCRIPTION - ORIENTATION: ORIENTATION: OTHER (COMMENT)

## 2024-09-24 ASSESSMENT — PAIN DESCRIPTION - PAIN TYPE
TYPE: CHRONIC PAIN
TYPE: CHRONIC PAIN

## 2024-09-24 ASSESSMENT — PAIN DESCRIPTION - FREQUENCY
FREQUENCY: CONTINUOUS
FREQUENCY: CONTINUOUS

## 2024-09-25 ENCOUNTER — HOSPITAL ENCOUNTER (OUTPATIENT)
Dept: HYPERBARIC MEDICINE | Age: 49
Discharge: HOME | End: 2024-09-25
Payer: MEDICARE

## 2024-09-25 VITALS
DIASTOLIC BLOOD PRESSURE: 91 MMHG | TEMPERATURE: 97.9 F | HEART RATE: 77 BPM | RESPIRATION RATE: 16 BRPM | SYSTOLIC BLOOD PRESSURE: 161 MMHG

## 2024-09-25 DIAGNOSIS — E11.621 DIABETIC ULCER OF RIGHT MIDFOOT ASSOCIATED WITH TYPE 2 DIABETES MELLITUS, WITH MUSCLE INVOLVEMENT WITHOUT EVIDENCE OF NECROSIS (HCC): ICD-10-CM

## 2024-09-25 DIAGNOSIS — L97.415 DIABETIC ULCER OF RIGHT MIDFOOT ASSOCIATED WITH TYPE 2 DIABETES MELLITUS, WITH MUSCLE INVOLVEMENT WITHOUT EVIDENCE OF NECROSIS (HCC): ICD-10-CM

## 2024-09-25 DIAGNOSIS — M86.672 CHRONIC REFRACTORY OSTEOMYELITIS OF FOOT, LEFT: Primary | ICD-10-CM

## 2024-09-25 DIAGNOSIS — E11.621 DIABETIC ULCER OF LEFT MIDFOOT ASSOCIATED WITH TYPE 2 DIABETES MELLITUS, WITH MUSCLE INVOLVEMENT WITHOUT EVIDENCE OF NECROSIS (HCC): ICD-10-CM

## 2024-09-25 DIAGNOSIS — L97.425 DIABETIC ULCER OF LEFT MIDFOOT ASSOCIATED WITH TYPE 2 DIABETES MELLITUS, WITH MUSCLE INVOLVEMENT WITHOUT EVIDENCE OF NECROSIS (HCC): ICD-10-CM

## 2024-09-25 LAB
GLUCOSE BLD-MCNC: 157 MG/DL (ref 74–99)
GLUCOSE BLD-MCNC: 185 MG/DL (ref 74–99)

## 2024-09-25 PROCEDURE — 82962 GLUCOSE BLOOD TEST: CPT

## 2024-09-25 PROCEDURE — G0277 HBOT, FULL BODY CHAMBER, 30M: HCPCS

## 2024-09-25 ASSESSMENT — PAIN DESCRIPTION - ONSET
ONSET: ON-GOING
ONSET: ON-GOING

## 2024-09-25 ASSESSMENT — PAIN DESCRIPTION - FREQUENCY
FREQUENCY: CONTINUOUS
FREQUENCY: CONTINUOUS

## 2024-09-25 ASSESSMENT — PAIN DESCRIPTION - DESCRIPTORS
DESCRIPTORS: SHARP
DESCRIPTORS: SHARP

## 2024-09-25 ASSESSMENT — PAIN SCALES - GENERAL
PAINLEVEL_OUTOF10: 6
PAINLEVEL_OUTOF10: 7

## 2024-09-25 ASSESSMENT — PAIN DESCRIPTION - LOCATION
LOCATION: BACK;OTHER (COMMENT)
LOCATION: BACK;OTHER (COMMENT)

## 2024-09-25 ASSESSMENT — PAIN DESCRIPTION - PAIN TYPE
TYPE: CHRONIC PAIN
TYPE: CHRONIC PAIN

## 2024-09-25 ASSESSMENT — PAIN DESCRIPTION - ORIENTATION: ORIENTATION: OTHER (COMMENT)

## 2024-09-26 ENCOUNTER — HOSPITAL ENCOUNTER (OUTPATIENT)
Dept: WOUND CARE | Age: 49
Discharge: HOME | End: 2024-09-26
Attending: STUDENT IN AN ORGANIZED HEALTH CARE EDUCATION/TRAINING PROGRAM

## 2024-09-27 ENCOUNTER — HOSPITAL ENCOUNTER (OUTPATIENT)
Dept: HYPERBARIC MEDICINE | Age: 49
Discharge: HOME | End: 2024-09-27
Payer: MEDICARE

## 2024-09-27 ENCOUNTER — HOSPITAL ENCOUNTER (OUTPATIENT)
Dept: WOUND CARE | Age: 49
Discharge: HOME | End: 2024-09-27
Attending: STUDENT IN AN ORGANIZED HEALTH CARE EDUCATION/TRAINING PROGRAM
Payer: MEDICARE

## 2024-09-27 VITALS
DIASTOLIC BLOOD PRESSURE: 91 MMHG | TEMPERATURE: 98.3 F | RESPIRATION RATE: 20 BRPM | HEART RATE: 82 BPM | SYSTOLIC BLOOD PRESSURE: 157 MMHG

## 2024-09-27 DIAGNOSIS — L97.415 DIABETIC ULCER OF RIGHT MIDFOOT ASSOCIATED WITH TYPE 2 DIABETES MELLITUS, WITH MUSCLE INVOLVEMENT WITHOUT EVIDENCE OF NECROSIS (HCC): ICD-10-CM

## 2024-09-27 DIAGNOSIS — E11.621 DIABETIC ULCER OF RIGHT MIDFOOT ASSOCIATED WITH TYPE 2 DIABETES MELLITUS, WITH MUSCLE INVOLVEMENT WITHOUT EVIDENCE OF NECROSIS (HCC): ICD-10-CM

## 2024-09-27 DIAGNOSIS — M86.672 CHRONIC REFRACTORY OSTEOMYELITIS OF FOOT, LEFT: Primary | ICD-10-CM

## 2024-09-27 DIAGNOSIS — E11.621 DIABETIC ULCER OF LEFT MIDFOOT ASSOCIATED WITH TYPE 2 DIABETES MELLITUS, WITH MUSCLE INVOLVEMENT WITHOUT EVIDENCE OF NECROSIS (HCC): ICD-10-CM

## 2024-09-27 DIAGNOSIS — L97.425 DIABETIC ULCER OF LEFT MIDFOOT ASSOCIATED WITH TYPE 2 DIABETES MELLITUS, WITH MUSCLE INVOLVEMENT WITHOUT EVIDENCE OF NECROSIS (HCC): ICD-10-CM

## 2024-09-27 LAB
GLUCOSE BLD-MCNC: 143 MG/DL (ref 74–99)
GLUCOSE BLD-MCNC: 151 MG/DL (ref 74–99)

## 2024-09-27 PROCEDURE — 97605 NEG PRS WND THER DME<=50SQCM: CPT

## 2024-09-27 PROCEDURE — 82962 GLUCOSE BLOOD TEST: CPT

## 2024-09-27 PROCEDURE — G0277 HBOT, FULL BODY CHAMBER, 30M: HCPCS

## 2024-09-27 ASSESSMENT — PAIN SCALES - GENERAL: PAINLEVEL_OUTOF10: 6

## 2024-09-27 ASSESSMENT — PAIN DESCRIPTION - FREQUENCY: FREQUENCY: CONTINUOUS

## 2024-09-27 ASSESSMENT — PAIN DESCRIPTION - LOCATION: LOCATION: OTHER (COMMENT)

## 2024-09-27 ASSESSMENT — PAIN DESCRIPTION - PAIN TYPE: TYPE: CHRONIC PAIN

## 2024-09-27 ASSESSMENT — PAIN DESCRIPTION - ONSET: ONSET: ON-GOING

## 2024-09-27 ASSESSMENT — PAIN - FUNCTIONAL ASSESSMENT: PAIN_FUNCTIONAL_ASSESSMENT: PREVENTS OR INTERFERES SOME ACTIVE ACTIVITIES AND ADLS

## 2024-09-27 ASSESSMENT — PAIN DESCRIPTION - DESCRIPTORS: DESCRIPTORS: SHARP

## 2024-09-30 ENCOUNTER — HOSPITAL ENCOUNTER (OUTPATIENT)
Dept: HYPERBARIC MEDICINE | Age: 49
Discharge: HOME | End: 2024-09-30
Payer: MEDICARE

## 2024-09-30 VITALS
TEMPERATURE: 97.7 F | HEART RATE: 73 BPM | RESPIRATION RATE: 16 BRPM | DIASTOLIC BLOOD PRESSURE: 90 MMHG | SYSTOLIC BLOOD PRESSURE: 153 MMHG

## 2024-09-30 DIAGNOSIS — M86.672 CHRONIC REFRACTORY OSTEOMYELITIS OF FOOT, LEFT: Primary | ICD-10-CM

## 2024-09-30 DIAGNOSIS — E11.621 DIABETIC ULCER OF RIGHT MIDFOOT ASSOCIATED WITH TYPE 2 DIABETES MELLITUS, WITH MUSCLE INVOLVEMENT WITHOUT EVIDENCE OF NECROSIS (HCC): ICD-10-CM

## 2024-09-30 DIAGNOSIS — L97.415 DIABETIC ULCER OF RIGHT MIDFOOT ASSOCIATED WITH TYPE 2 DIABETES MELLITUS, WITH MUSCLE INVOLVEMENT WITHOUT EVIDENCE OF NECROSIS (HCC): ICD-10-CM

## 2024-09-30 DIAGNOSIS — E11.621 DIABETIC ULCER OF LEFT MIDFOOT ASSOCIATED WITH TYPE 2 DIABETES MELLITUS, WITH MUSCLE INVOLVEMENT WITHOUT EVIDENCE OF NECROSIS (HCC): ICD-10-CM

## 2024-09-30 DIAGNOSIS — L97.425 DIABETIC ULCER OF LEFT MIDFOOT ASSOCIATED WITH TYPE 2 DIABETES MELLITUS, WITH MUSCLE INVOLVEMENT WITHOUT EVIDENCE OF NECROSIS (HCC): ICD-10-CM

## 2024-09-30 LAB
GLUCOSE BLD-MCNC: 156 MG/DL (ref 74–99)
GLUCOSE BLD-MCNC: 157 MG/DL (ref 74–99)

## 2024-09-30 PROCEDURE — G0277 HBOT, FULL BODY CHAMBER, 30M: HCPCS

## 2024-09-30 PROCEDURE — 99183 HYPERBARIC OXYGEN THERAPY: CPT | Performed by: NURSE PRACTITIONER

## 2024-09-30 PROCEDURE — 82962 GLUCOSE BLOOD TEST: CPT

## 2024-09-30 ASSESSMENT — PAIN DESCRIPTION - FREQUENCY
FREQUENCY: CONTINUOUS
FREQUENCY: CONTINUOUS

## 2024-09-30 ASSESSMENT — PAIN DESCRIPTION - ONSET
ONSET: ON-GOING
ONSET: ON-GOING

## 2024-09-30 ASSESSMENT — PAIN DESCRIPTION - LOCATION
LOCATION: OTHER (COMMENT)
LOCATION: OTHER (COMMENT)

## 2024-09-30 ASSESSMENT — PAIN DESCRIPTION - DESCRIPTORS
DESCRIPTORS: SHARP
DESCRIPTORS: SHARP

## 2024-09-30 ASSESSMENT — PAIN DESCRIPTION - ORIENTATION
ORIENTATION: OTHER (COMMENT)
ORIENTATION: OTHER (COMMENT)

## 2024-09-30 ASSESSMENT — PAIN SCALES - GENERAL
PAINLEVEL_OUTOF10: 6
PAINLEVEL_OUTOF10: 6

## 2024-09-30 ASSESSMENT — PAIN DESCRIPTION - PAIN TYPE
TYPE: CHRONIC PAIN
TYPE: CHRONIC PAIN

## 2024-10-01 ENCOUNTER — HOSPITAL ENCOUNTER (OUTPATIENT)
Dept: HYPERBARIC MEDICINE | Age: 49
Discharge: HOME | End: 2024-10-01
Payer: MEDICARE

## 2024-10-01 ENCOUNTER — HOSPITAL ENCOUNTER (OUTPATIENT)
Dept: WOUND CARE | Age: 49
Discharge: HOME | End: 2024-10-01
Attending: STUDENT IN AN ORGANIZED HEALTH CARE EDUCATION/TRAINING PROGRAM
Payer: MEDICARE

## 2024-10-01 VITALS
SYSTOLIC BLOOD PRESSURE: 143 MMHG | DIASTOLIC BLOOD PRESSURE: 85 MMHG | RESPIRATION RATE: 20 BRPM | TEMPERATURE: 98.3 F | HEART RATE: 75 BPM

## 2024-10-01 VITALS
DIASTOLIC BLOOD PRESSURE: 69 MMHG | TEMPERATURE: 97.4 F | HEART RATE: 81 BPM | SYSTOLIC BLOOD PRESSURE: 111 MMHG | RESPIRATION RATE: 20 BRPM

## 2024-10-01 DIAGNOSIS — E11.621 DIABETIC ULCER OF LEFT MIDFOOT ASSOCIATED WITH TYPE 2 DIABETES MELLITUS, WITH MUSCLE INVOLVEMENT WITHOUT EVIDENCE OF NECROSIS (HCC): ICD-10-CM

## 2024-10-01 DIAGNOSIS — E11.21 DIABETIC NEPHROPATHY ASSOCIATED WITH TYPE 2 DIABETES MELLITUS (HCC): ICD-10-CM

## 2024-10-01 DIAGNOSIS — M79.662 PAIN IN LEFT LOWER LEG: ICD-10-CM

## 2024-10-01 DIAGNOSIS — N18.6 ESRD (END STAGE RENAL DISEASE) ON DIALYSIS (HCC): ICD-10-CM

## 2024-10-01 DIAGNOSIS — Z99.2 ESRD (END STAGE RENAL DISEASE) ON DIALYSIS (HCC): ICD-10-CM

## 2024-10-01 DIAGNOSIS — M86.672 CHRONIC REFRACTORY OSTEOMYELITIS OF FOOT, LEFT: ICD-10-CM

## 2024-10-01 DIAGNOSIS — E11.621 DIABETIC ULCER OF MIDFOOT ASSOCIATED WITH TYPE 2 DIABETES MELLITUS, WITH MUSCLE INVOLVEMENT WITHOUT EVIDENCE OF NECROSIS, UNSPECIFIED LATERALITY (HCC): Primary | ICD-10-CM

## 2024-10-01 DIAGNOSIS — L97.425 DIABETIC ULCER OF LEFT MIDFOOT ASSOCIATED WITH TYPE 2 DIABETES MELLITUS, WITH MUSCLE INVOLVEMENT WITHOUT EVIDENCE OF NECROSIS (HCC): ICD-10-CM

## 2024-10-01 DIAGNOSIS — Z89.422 ABSENCE OF TOE OF LEFT FOOT (HCC): Primary | ICD-10-CM

## 2024-10-01 DIAGNOSIS — L97.405 DIABETIC ULCER OF MIDFOOT ASSOCIATED WITH TYPE 2 DIABETES MELLITUS, WITH MUSCLE INVOLVEMENT WITHOUT EVIDENCE OF NECROSIS, UNSPECIFIED LATERALITY (HCC): Primary | ICD-10-CM

## 2024-10-01 PROBLEM — Z78.9 PRESENCE OF SURGICAL INCISION: Status: RESOLVED | Noted: 2024-04-30 | Resolved: 2024-10-01

## 2024-10-01 LAB
GLUCOSE BLD-MCNC: 152 MG/DL (ref 74–99)
GLUCOSE BLD-MCNC: 156 MG/DL (ref 74–99)

## 2024-10-01 PROCEDURE — 11042 DBRDMT SUBQ TIS 1ST 20SQCM/<: CPT | Performed by: NURSE PRACTITIONER

## 2024-10-01 PROCEDURE — 11042 DBRDMT SUBQ TIS 1ST 20SQCM/<: CPT

## 2024-10-01 PROCEDURE — 99183 HYPERBARIC OXYGEN THERAPY: CPT | Performed by: NURSE PRACTITIONER

## 2024-10-01 PROCEDURE — 82962 GLUCOSE BLOOD TEST: CPT

## 2024-10-01 PROCEDURE — G0277 HBOT, FULL BODY CHAMBER, 30M: HCPCS

## 2024-10-01 RX ORDER — OXYCODONE AND ACETAMINOPHEN 10; 325 MG/1; MG/1
1 TABLET ORAL EVERY 6 HOURS PRN
Qty: 56 TABLET | Refills: 0 | Status: SHIPPED | OUTPATIENT
Start: 2024-10-08 | End: 2024-10-22

## 2024-10-01 RX ORDER — MUPIROCIN 20 MG/G
OINTMENT TOPICAL ONCE
OUTPATIENT
Start: 2024-10-01 | End: 2024-10-01

## 2024-10-01 RX ORDER — LIDOCAINE HYDROCHLORIDE 20 MG/ML
JELLY TOPICAL ONCE
OUTPATIENT
Start: 2024-10-01 | End: 2024-10-01

## 2024-10-01 RX ORDER — SODIUM CHLOR/HYPOCHLOROUS ACID 0.033 %
SOLUTION, IRRIGATION IRRIGATION ONCE
OUTPATIENT
Start: 2024-10-01 | End: 2024-10-01

## 2024-10-01 RX ORDER — BETAMETHASONE DIPROPIONATE 0.5 MG/G
CREAM TOPICAL ONCE
OUTPATIENT
Start: 2024-10-01 | End: 2024-10-01

## 2024-10-01 RX ORDER — LIDOCAINE 50 MG/G
OINTMENT TOPICAL ONCE
OUTPATIENT
Start: 2024-10-01 | End: 2024-10-01

## 2024-10-01 RX ORDER — BACITRACIN ZINC AND POLYMYXIN B SULFATE 500; 1000 [USP'U]/G; [USP'U]/G
OINTMENT TOPICAL ONCE
OUTPATIENT
Start: 2024-10-01 | End: 2024-10-01

## 2024-10-01 RX ORDER — LIDOCAINE HYDROCHLORIDE 40 MG/ML
SOLUTION TOPICAL ONCE
OUTPATIENT
Start: 2024-10-01 | End: 2024-10-01

## 2024-10-01 RX ORDER — LIDOCAINE 40 MG/G
CREAM TOPICAL ONCE
OUTPATIENT
Start: 2024-10-01 | End: 2024-10-01

## 2024-10-01 RX ORDER — BACITRACIN ZINC 500 [USP'U]/G
OINTMENT TOPICAL ONCE
OUTPATIENT
Start: 2024-10-01 | End: 2024-10-01

## 2024-10-01 RX ORDER — NEOMYCIN/BACITRACIN/POLYMYXINB 3.5-400-5K
OINTMENT (GRAM) TOPICAL ONCE
OUTPATIENT
Start: 2024-10-01 | End: 2024-10-01

## 2024-10-01 RX ORDER — GENTAMICIN SULFATE 1 MG/G
OINTMENT TOPICAL ONCE
OUTPATIENT
Start: 2024-10-01 | End: 2024-10-01

## 2024-10-01 RX ORDER — TRIAMCINOLONE ACETONIDE 1 MG/G
OINTMENT TOPICAL ONCE
OUTPATIENT
Start: 2024-10-01 | End: 2024-10-01

## 2024-10-01 RX ORDER — SILVER SULFADIAZINE 10 MG/G
CREAM TOPICAL ONCE
OUTPATIENT
Start: 2024-10-01 | End: 2024-10-01

## 2024-10-01 RX ORDER — CLOBETASOL PROPIONATE 0.5 MG/G
OINTMENT TOPICAL ONCE
OUTPATIENT
Start: 2024-10-01 | End: 2024-10-01

## 2024-10-01 ASSESSMENT — PAIN DESCRIPTION - DESCRIPTORS
DESCRIPTORS: SHARP

## 2024-10-01 ASSESSMENT — PAIN DESCRIPTION - PAIN TYPE
TYPE: CHRONIC PAIN

## 2024-10-01 ASSESSMENT — PAIN SCALES - GENERAL
PAINLEVEL_OUTOF10: 7

## 2024-10-01 ASSESSMENT — PAIN DESCRIPTION - FREQUENCY
FREQUENCY: CONTINUOUS

## 2024-10-01 ASSESSMENT — PAIN DESCRIPTION - ORIENTATION
ORIENTATION: OTHER (COMMENT)
ORIENTATION: LEFT
ORIENTATION: OTHER (COMMENT)

## 2024-10-01 ASSESSMENT — PAIN DESCRIPTION - LOCATION
LOCATION: OTHER (COMMENT)
LOCATION: FOOT
LOCATION: OTHER (COMMENT)

## 2024-10-01 ASSESSMENT — PAIN DESCRIPTION - ONSET
ONSET: ON-GOING

## 2024-10-01 NOTE — PATIENT INSTRUCTIONS
PHYSICIAN ORDERS AND DISCHARGE INSTRUCTIONS     Wound cleansing:              Do not scrub or use excessive force.             Wash hands with soap and water before and after dressing changes.             Prior to applying a clean dressing, cleanse wound with normal saline,               wound cleanser, or mild soap and water.              Ask the physician or nurse before getting the wound(s) wet in a shower                      Wound Care Notes:  Sees Dr Kumar.                Applied for Kerecis grafts on 24  Donated Kerecis applied to left lateral foot 24                             Orders for this week: 10/1/2024    Wound vac on hold week of 10/1/24    Left Lateral Distal plantar wound: Wash with soap and water, pat dry.  Pack anasept spray dampened hydrofera blue (cut in half lengthwise) to depth/tunnel of wound.  Then apply stimulen and puracol ag to remainder of wound bed.  Cover with ca alginate and agile.  Bolster/secure with medipore tape.  Cover with 2 ABD pads.  Wrap with coban 2 full (may use ace wrap if coban 2 full not available).  Change on Monday and Thursday (or Friday).        Plan: HBO workup started 9/10/24.   [facility] discharge due to not home bound 24.   Toenails 24       Nurse visit Thursday (or Friday) Morning  Then nurse visits on Tuesday and Thursday (or Friday) week of 10/7/24  Follow Up Instructions: At the Wound Care Center in 2 weeks.    Primary Wound Care Provider: Shelly Rubalcava CNP  Call  for any questions or concerns.  Central [phone] for imaging lab work

## 2024-10-01 NOTE — PROGRESS NOTES
Multilayer Compression Wrap   (Not Unna) Below the Knee    NAME:  Yovanny Levine  YOB: 1975  MEDICAL RECORD NUMBER:  [id number]  DATE:  10/1/2024    Multilayer compression wrap: Removed old Multilayer wrap if indicated and wash leg with mild soap/water.  Applied moisturizing agent to dry skin as needed.   Applied primary and secondary dressing as ordered.  Applied multilayered dressing below the knee to left lower leg.  Instructed patient/caregiver not to remove dressing and to keep it clean and dry.   Instructed patient/caregiver on complications to report to provider, such as pain, numbness in toes, heavy drainage, and slippage of dressing.  Instructed patient on purpose of compression dressing and on activity and exercise recommendations.      Electronically signed by Kat Dailey LPN on 10/1/2024 at 11:41 AM  
fracture 1986       PAST SURGICAL HISTORY    Past Surgical History:   Procedure Laterality Date    ABDOMEN SURGERY      CARDIAC CATHETERIZATION  2003, 2013    Normal (Dr. Winslow)    COLONOSCOPY  06/02/2011    Pandiverticulosis, Nonbleeding internal hemorrhoids, Repeat colonoscopy at age 50- Dr. Medina    CORONARY STENT PLACEMENT      x 3    FOOT DEBRIDEMENT Left 8/30/2023    FOOT DEBRIDEMENT INCISION AND DRAINAGE performed by Sammy Hsieh MD at [facility] OR    FOOT DEBRIDEMENT Left 4/23/2024    FOOT DEBRIDEMENT INCISION AND DRAINAGE performed by Claudy Forrest DO at [facility] OR    FOOT SURGERY Left 12/21/2021    EXCISION OF HEAD LEFT 2ND METATARSAL performed by Andrzej Webber DPM at [facility] OR    IR NONTUNNELED VASCULAR CATHETER  07/25/2022    IR NONTUNNELED VASCULAR CATHETER 7/25/2022 [facility] PROC[facility]    IR TUNNELED CATHETER PLACEMENT GREATER THAN 5 YEARS  07/27/2022    IR TUNNELED CATHETER PLACEMENT GREATER THAN 5 YEARS 7/27/2022 [facility] PROCED[facility]    IR TUNNELED CATHETER PLACEMENT GREATER THAN 5 YEARS  11/28/2022    IR TUNNELED CATHETER PLACEMENT GREATER THAN 5 YEARS 11/28/2022 [facility] PROCED[facility]    NOSE SURGERY  1993    \"had reconstruction surgery on nose a year after the other nose surgery\"    OTHER SURGICAL HISTORY Right 05/22/2015    I & D right great toe with partial amputation    OTHER SURGICAL HISTORY  12/04/2017    inc and drainage of abcess    IN INCISION BONE CORTEX FOOT Left 09/22/2018    LEFT FOOT DEBRIDEMENT INCISION AND DRAINAGE TOP AND BOTTOM performed by Jose Caba DPM at [facility] OR    SCROTAL SURGERY N/A 05/15/2021    SCROTAL AND PERINEAL DEBRIDEMENT performed by Elder Betancur MD at [facility] OR    SCROTAL SURGERY N/A 05/16/2021    SCROTAL RE-DEBRIDEMENT  INCISION AND DRAINAGE performed by Elder Betancur MD at [facility] OR    SKIN BIOPSY N/A 06/18/2013    sebaceous cyst removal times 4     TOE AMPUTATION      right great toe    TOE AMPUTATION Right 03/23/2019    TOE AMPUTATION RIGHT 5TH TOE

## 2024-10-01 NOTE — PLAN OF CARE
Problem: Chronic Conditions and Co-morbidities  Goal: Patient's chronic conditions and co-morbidity symptoms are monitored and maintained or improved  10/1/2024 1137 by Rosie Carpio LPN  Outcome: Progressing     Problem: Pain  Goal: Verbalizes/displays adequate comfort level or baseline comfort level  Outcome: Progressing

## 2024-10-02 ENCOUNTER — HOSPITAL ENCOUNTER (OUTPATIENT)
Dept: WOUND CARE | Age: 49
Discharge: HOME | End: 2024-10-02
Attending: SURGERY
Payer: MEDICARE

## 2024-10-02 VITALS
HEART RATE: 84 BPM | DIASTOLIC BLOOD PRESSURE: 104 MMHG | RESPIRATION RATE: 16 BRPM | TEMPERATURE: 97.7 F | SYSTOLIC BLOOD PRESSURE: 180 MMHG

## 2024-10-02 LAB — GLUCOSE BLD-MCNC: 231 MG/DL (ref 74–99)

## 2024-10-02 PROCEDURE — 29581 APPL MULTLAYER CMPRN SYS LEG: CPT

## 2024-10-02 PROCEDURE — 82962 GLUCOSE BLOOD TEST: CPT

## 2024-10-02 ASSESSMENT — PAIN DESCRIPTION - ONSET: ONSET: ON-GOING

## 2024-10-02 ASSESSMENT — PAIN DESCRIPTION - FREQUENCY: FREQUENCY: CONTINUOUS

## 2024-10-02 ASSESSMENT — PAIN DESCRIPTION - DESCRIPTORS: DESCRIPTORS: OTHER (COMMENT)

## 2024-10-02 ASSESSMENT — PAIN DESCRIPTION - ORIENTATION: ORIENTATION: OTHER (COMMENT)

## 2024-10-02 ASSESSMENT — PAIN DESCRIPTION - LOCATION: LOCATION: OTHER (COMMENT)

## 2024-10-02 ASSESSMENT — PAIN DESCRIPTION - PAIN TYPE: TYPE: CHRONIC PAIN

## 2024-10-02 ASSESSMENT — PAIN SCALES - GENERAL: PAINLEVEL_OUTOF10: 7

## 2024-10-02 ASSESSMENT — PAIN - FUNCTIONAL ASSESSMENT: PAIN_FUNCTIONAL_ASSESSMENT: PREVENTS OR INTERFERES SOME ACTIVE ACTIVITIES AND ADLS

## 2024-10-02 NOTE — PROGRESS NOTES
Patient here this morning for HBO treatment ; SN unaware of Physician arriving late @ 0930 due to being in surgery this morning. Pre Tx. VS-@0756 97.7-84-/104 , BS-231.Patient agreed to wait and get in as much treatment as possible before his dialysis chair time 1145. At 0945 received message Physician not going to make it to clinic today ; patient resting comfortably /sleeping on HBO bed ;will continue to do so until needing to get ready to go to dialysis. At 1110 patient awake and changed clothing to go to dialysis. No signs of distress noted.

## 2024-10-02 NOTE — PROGRESS NOTES
Patient reported multiple times feeling shooting pain in the left foot and feeling like possibly the compression wrap may be too tight. This SN reported to ROBIN King and asked him to evaluate. Fernando removed current wrap , assessed , cut back some of the tape that may have been pulling on top of the foot and applied new compression wrap. Pt. Reports feels better after intervention.

## 2024-10-03 ENCOUNTER — HOSPITAL ENCOUNTER (OUTPATIENT)
Dept: WOUND CARE | Age: 49
Discharge: HOME | End: 2024-10-03
Attending: STUDENT IN AN ORGANIZED HEALTH CARE EDUCATION/TRAINING PROGRAM
Payer: MEDICARE

## 2024-10-03 ENCOUNTER — HOSPITAL ENCOUNTER (OUTPATIENT)
Dept: HYPERBARIC MEDICINE | Age: 49
Discharge: HOME | End: 2024-10-03
Payer: MEDICARE

## 2024-10-03 VITALS
HEART RATE: 76 BPM | RESPIRATION RATE: 20 BRPM | TEMPERATURE: 98.3 F | DIASTOLIC BLOOD PRESSURE: 91 MMHG | SYSTOLIC BLOOD PRESSURE: 168 MMHG

## 2024-10-03 DIAGNOSIS — L97.425 DIABETIC ULCER OF LEFT MIDFOOT ASSOCIATED WITH TYPE 2 DIABETES MELLITUS, WITH MUSCLE INVOLVEMENT WITHOUT EVIDENCE OF NECROSIS (HCC): ICD-10-CM

## 2024-10-03 DIAGNOSIS — L97.415 DIABETIC ULCER OF RIGHT MIDFOOT ASSOCIATED WITH TYPE 2 DIABETES MELLITUS, WITH MUSCLE INVOLVEMENT WITHOUT EVIDENCE OF NECROSIS (HCC): ICD-10-CM

## 2024-10-03 DIAGNOSIS — E11.621 DIABETIC ULCER OF LEFT MIDFOOT ASSOCIATED WITH TYPE 2 DIABETES MELLITUS, WITH MUSCLE INVOLVEMENT WITHOUT EVIDENCE OF NECROSIS (HCC): ICD-10-CM

## 2024-10-03 DIAGNOSIS — M86.672 CHRONIC REFRACTORY OSTEOMYELITIS OF FOOT, LEFT: Primary | ICD-10-CM

## 2024-10-03 DIAGNOSIS — E11.621 DIABETIC ULCER OF RIGHT MIDFOOT ASSOCIATED WITH TYPE 2 DIABETES MELLITUS, WITH MUSCLE INVOLVEMENT WITHOUT EVIDENCE OF NECROSIS (HCC): ICD-10-CM

## 2024-10-03 LAB
GLUCOSE BLD-MCNC: 149 MG/DL (ref 74–99)
GLUCOSE BLD-MCNC: 169 MG/DL (ref 74–99)

## 2024-10-03 PROCEDURE — 82962 GLUCOSE BLOOD TEST: CPT

## 2024-10-03 PROCEDURE — 29581 APPL MULTLAYER CMPRN SYS LEG: CPT

## 2024-10-03 PROCEDURE — 99183 HYPERBARIC OXYGEN THERAPY: CPT | Performed by: NURSE PRACTITIONER

## 2024-10-03 PROCEDURE — G0277 HBOT, FULL BODY CHAMBER, 30M: HCPCS

## 2024-10-03 ASSESSMENT — PAIN SCALES - GENERAL
PAINLEVEL_OUTOF10: 6
PAINLEVEL_OUTOF10: 6

## 2024-10-03 ASSESSMENT — PAIN DESCRIPTION - FREQUENCY
FREQUENCY: CONTINUOUS
FREQUENCY: CONTINUOUS

## 2024-10-03 ASSESSMENT — PAIN DESCRIPTION - LOCATION
LOCATION: OTHER (COMMENT);ABDOMEN
LOCATION: OTHER (COMMENT)

## 2024-10-03 ASSESSMENT — PAIN DESCRIPTION - DESCRIPTORS
DESCRIPTORS: SPASM;SHARP
DESCRIPTORS: SHARP

## 2024-10-03 ASSESSMENT — PAIN DESCRIPTION - PAIN TYPE
TYPE: CHRONIC PAIN
TYPE: CHRONIC PAIN

## 2024-10-03 ASSESSMENT — PAIN DESCRIPTION - ORIENTATION
ORIENTATION: OTHER (COMMENT)
ORIENTATION: OTHER (COMMENT)

## 2024-10-03 ASSESSMENT — PAIN DESCRIPTION - ONSET
ONSET: ON-GOING
ONSET: ON-GOING

## 2024-10-03 NOTE — PLAN OF CARE
Problem: Pain  Goal: Verbalizes/displays adequate comfort level or baseline comfort level  Outcome: Progressing      Anticoagulation Summary  As of 2019    INR goal:   2.0-3.0   TTR:   58.6 % (3.2 y)   INR used for dosin.2 (2019)   Warfarin maintenance plan:   10 mg (10 mg x 1) every M, F; 15 mg (10 mg x 1 and 5 mg x 1) all other days   Weekly warfarin total:   95 mg   No change documented:   Gloria Lares RN   Plan last modified:   Glen Heredia RN (2018)   Next INR check:   3/7/2019   Priority:   Follow-Up - 4 Weeks   Target end date:       Indications    Long-term (current) use of anticoagulants [Z79.01]  Acute saddle pulmonary embolism without acute cor pulmonale (CMS/HCC) [I26.92]  Chronic saddle pulmonary embolism without acute cor pulmonale (CMS/HCC) [I26.92]             Anticoagulation Episode Summary     INR check location:   [facility]    Preferred lab:       Send INR reminders to:   ANTICOAG MONITORING [facility]    Comments:   Referral renewal due 2017. Takes pills in the morning. 2 to 3 beers/week? Is using pill tray now. Vit K foods. PE on 2016, 2012, 2011.      Anticoagulation Care Providers     Provider Role Specialty Phone number    Romel Chamorro DO Referring [facility] [phone]        INR 2.2, at target of 2.0 to 3.0. In clinic.     Discussed cont. warfarin dose, using the 5mg tabs: Take 15 mg daily except 10mg M/F. Recheck INR in 4 weeks.    No med changes. No bleeding problems. Patient denies diet changes. Reviewed Anticoag Flowsheet. Teaching given regarding vit K foods and weekly consistency; fall protocol. Onsite provider is hitesh. Patient ambulated to office with self. Patient verbalizes understanding and agreement.

## 2024-10-03 NOTE — PROGRESS NOTES
Multilayer Compression Wrap   (Not Unna) Below the Knee    NAME:  Yovanny Levine  YOB: 1975  MEDICAL RECORD NUMBER:  [id number]  DATE:  10/3/2024    Multilayer compression wrap: Removed old Multilayer wrap if indicated and wash leg with mild soap/water.  Applied moisturizing agent to dry skin as needed.   Applied primary and secondary dressing as ordered.  Applied multilayered dressing below the knee to left lower leg.  Instructed patient/caregiver not to remove dressing and to keep it clean and dry.   Instructed patient/caregiver on complications to report to provider, such as pain, numbness in toes, heavy drainage, and slippage of dressing.  Instructed patient on purpose of compression dressing and on activity and exercise recommendations.      Electronically signed by Shila Carroll LPN on 10/3/2024 at 10:46 AM

## 2024-10-03 NOTE — PROGRESS NOTES
[facility].    In my clinical judgement, ongoing HBO therapy is  necessary at this time, given a threat to patient function, limb or life from the current condition.      Supervision and attendance of Hyperbaric Oxygen Therapy provided.  Continue HBO treatment as outlined in the treatment plan.    Hyperbaric Oxygen: Yovanny Levine tolerated Treatment Number: 6 well today without complications.     Electronically signed by APPLE Aguilera CNP on 9/30/2024 at 5:28 AM

## 2024-10-03 NOTE — PROGRESS NOTES
[facility]  Hyperbaric Oxygen Therapy   Progress Note      NAME: Yovanny Levine   MEDICAL RECORD NUMBER:  [id number]  AGE: 49 y.o.   GENDER: male  : 1975  EPISODE DATE:  10/3/2024     Subjective     HBO Treatment Number: 8 out of Total Treatments: 30    HBO Diagnosis:     Indications: Lower Extremity Diabetic Wound ___(site) (left foot)  Dave: Dave 3     Safety checks performed prior to treatment.  See doc flowsheets for documentation.    Objective           Recent Labs     10/03/24  0811 10/03/24  1042   POCGLU 149* 169*       Pre treatment Vital Signs       Temp: 98.1 °F (36.7 °C)     Pulse: 83     Respirations: 16     BP: (!) 192/101 (Reported to provider; Morning meds taken. Patient does not report S/S HTN)     Blood Sugar 149    Post treatment Vital Signs  Temp: 98.3 °F (36.8 °C)  Pulse: 76  Respirations: 20  BP: (!) 168/91 (Noted to provider)  Blood Sugar 169    Assessment        Physical Exam:  General Appearance:  alert and oriented to person, place and time, well-developed and well-nourished, in no acute distress    Pre Tympanic Membrane Assessment:  tympanic membranes intact bilaterally, normal color    Post Tympanic Membrane Assessment:  Right: Normal (No complaints per Pt.)  Left: Normal (No complaints per Pt.)    Pulmonary/Chest:  clear to auscultation bilaterally- no wheezes, rales or rhonchi, normal air movement, no respiratory distress    Cardiovascular:  normal, regular rate and rhythm    Chamber #: [id number]       Treatment Start Time: 0824     Pressure Reached Time: 0839  CAMRON : 2  Number of Air Breaks:  Treatment Status: No Air break      Decompression Time: 1009   Treatment End Time: 1024    Symptoms Noted During Treatment: None    Adverse Event: no      Total time in chamber: 120  Minutes at depth: 90    I was present on these premises and immediately available to furnish assistance & direction throughout the procedure.       Plan          Yovanny Levine is a

## 2024-10-03 NOTE — PROGRESS NOTES
[facility]  Hyperbaric Oxygen Therapy   Progress Note      NAME: Yovanny Levine   MEDICAL RECORD NUMBER:  [id number]  AGE: 49 y.o.   GENDER: male  : 1975  EPISODE DATE:  10/1/2024     Subjective     HBO Treatment Number: 7 out of Total Treatments: 30    HBO Diagnosis:     Indications: Lower Extremity Diabetic Wound ___(site) (left foot)  Dave: Dave 3     Safety checks performed prior to treatment.  See doc flowsheets for documentation.    Objective           Recent Labs     10/01/24  0801 10/01/24  1034   POCGLU 152 156       Pre treatment Vital Signs       Temp: 98 °F (36.7 °C)     Pulse: 86     Respirations: 16     BP: (!) 182/100 (Reported  to provider pre Treatment ; morning meds taken; no s/s HTN)     Blood Sugar 152    Post treatment Vital Signs  Temp: 98.3 °F (36.8 °C)  Pulse: 75  Respirations: 20  BP: (!) 143/85 (Noted to provider)  Blood Sugar 156    Assessment        Physical Exam:  General Appearance:  alert and oriented to person, place and time, well-developed and well-nourished, in no acute distress    Pre Tympanic Membrane Assessment:  tympanic membranes intact bilaterally, normal color    Post Tympanic Membrane Assessment:  Right: Normal (No complaints per Pt.)  Left: Normal (No complaints per pt.)    Pulmonary/Chest:  clear to auscultation bilaterally- no wheezes, rales or rhonchi, normal air movement, no respiratory distress    Cardiovascular:  normal, regular rate and rhythm    Chamber #: [id number]       Treatment Start Time: 0815     Pressure Reached Time: 0829  CAMRON : 2  Number of Air Breaks:  Treatment Status: No Air break      Decompression Time: 0959   Treatment End Time: 1014    Symptoms Noted During Treatment: None    Adverse Event: no      Total time in chamber: 119  Minutes at depth: 90    I was present on these premises and immediately available to furnish assistance & direction throughout the procedure.       Plan          Yovanny Levine is a 49 y.o.

## 2024-10-04 ENCOUNTER — HOSPITAL ENCOUNTER (OUTPATIENT)
Dept: HYPERBARIC MEDICINE | Age: 49
Discharge: HOME | End: 2024-10-04
Payer: MEDICARE

## 2024-10-04 VITALS
HEART RATE: 72 BPM | DIASTOLIC BLOOD PRESSURE: 82 MMHG | TEMPERATURE: 98.1 F | RESPIRATION RATE: 16 BRPM | SYSTOLIC BLOOD PRESSURE: 143 MMHG

## 2024-10-04 DIAGNOSIS — M86.672 CHRONIC REFRACTORY OSTEOMYELITIS OF FOOT, LEFT: Primary | ICD-10-CM

## 2024-10-04 DIAGNOSIS — E11.621 DIABETIC ULCER OF RIGHT MIDFOOT ASSOCIATED WITH TYPE 2 DIABETES MELLITUS, WITH MUSCLE INVOLVEMENT WITHOUT EVIDENCE OF NECROSIS (HCC): ICD-10-CM

## 2024-10-04 DIAGNOSIS — E11.621 DIABETIC ULCER OF LEFT MIDFOOT ASSOCIATED WITH TYPE 2 DIABETES MELLITUS, WITH MUSCLE INVOLVEMENT WITHOUT EVIDENCE OF NECROSIS (HCC): ICD-10-CM

## 2024-10-04 DIAGNOSIS — L97.415 DIABETIC ULCER OF RIGHT MIDFOOT ASSOCIATED WITH TYPE 2 DIABETES MELLITUS, WITH MUSCLE INVOLVEMENT WITHOUT EVIDENCE OF NECROSIS (HCC): ICD-10-CM

## 2024-10-04 DIAGNOSIS — L97.425 DIABETIC ULCER OF LEFT MIDFOOT ASSOCIATED WITH TYPE 2 DIABETES MELLITUS, WITH MUSCLE INVOLVEMENT WITHOUT EVIDENCE OF NECROSIS (HCC): ICD-10-CM

## 2024-10-04 LAB
GLUCOSE BLD-MCNC: 172 MG/DL (ref 74–99)
GLUCOSE BLD-MCNC: 173 MG/DL (ref 74–99)

## 2024-10-04 PROCEDURE — G0277 HBOT, FULL BODY CHAMBER, 30M: HCPCS

## 2024-10-04 PROCEDURE — 82962 GLUCOSE BLOOD TEST: CPT

## 2024-10-04 ASSESSMENT — PAIN SCALES - GENERAL
PAINLEVEL_OUTOF10: 6
PAINLEVEL_OUTOF10: 6

## 2024-10-04 ASSESSMENT — PAIN DESCRIPTION - ORIENTATION
ORIENTATION: OTHER (COMMENT)
ORIENTATION: OTHER (COMMENT)

## 2024-10-04 ASSESSMENT — PAIN DESCRIPTION - PAIN TYPE: TYPE: CHRONIC PAIN

## 2024-10-04 ASSESSMENT — PAIN DESCRIPTION - DESCRIPTORS
DESCRIPTORS: SHARP
DESCRIPTORS: SHARP

## 2024-10-04 ASSESSMENT — PAIN DESCRIPTION - ONSET
ONSET: ON-GOING
ONSET: ON-GOING

## 2024-10-04 ASSESSMENT — PAIN DESCRIPTION - LOCATION
LOCATION: OTHER (COMMENT)
LOCATION: OTHER (COMMENT)

## 2024-10-04 ASSESSMENT — PAIN DESCRIPTION - FREQUENCY
FREQUENCY: CONTINUOUS
FREQUENCY: CONTINUOUS

## 2024-10-04 NOTE — PROGRESS NOTES
today's hyperbaric oxygen treatment at [facility].    In my clinical judgement, ongoing HBO therapy is  necessary at this time, given a threat to patient function, limb or life from the current condition.      Supervision and attendance of Hyperbaric Oxygen Therapy provided.  Continue HBO treatment as outlined in the treatment plan.    Hyperbaric Oxygen: Yovanny Levine tolerated Treatment Number: 9 well today without complications.     Electronically signed by APPLE Posadas CNP on 10/4/2024 at 12:41 PM

## 2024-10-08 ENCOUNTER — HOSPITAL ENCOUNTER (OUTPATIENT)
Dept: HYPERBARIC MEDICINE | Age: 49
Discharge: HOME | End: 2024-10-08
Payer: MEDICARE

## 2024-10-08 ENCOUNTER — HOSPITAL ENCOUNTER (OUTPATIENT)
Dept: WOUND CARE | Age: 49
Discharge: HOME | End: 2024-10-08
Attending: STUDENT IN AN ORGANIZED HEALTH CARE EDUCATION/TRAINING PROGRAM
Payer: MEDICARE

## 2024-10-08 VITALS
TEMPERATURE: 97.6 F | SYSTOLIC BLOOD PRESSURE: 170 MMHG | DIASTOLIC BLOOD PRESSURE: 80 MMHG | HEART RATE: 82 BPM | RESPIRATION RATE: 16 BRPM

## 2024-10-08 DIAGNOSIS — E11.621 DIABETIC ULCER OF RIGHT MIDFOOT ASSOCIATED WITH TYPE 2 DIABETES MELLITUS, WITH MUSCLE INVOLVEMENT WITHOUT EVIDENCE OF NECROSIS (HCC): ICD-10-CM

## 2024-10-08 DIAGNOSIS — L97.415 DIABETIC ULCER OF RIGHT MIDFOOT ASSOCIATED WITH TYPE 2 DIABETES MELLITUS, WITH MUSCLE INVOLVEMENT WITHOUT EVIDENCE OF NECROSIS (HCC): ICD-10-CM

## 2024-10-08 DIAGNOSIS — L97.425 DIABETIC ULCER OF LEFT MIDFOOT ASSOCIATED WITH TYPE 2 DIABETES MELLITUS, WITH MUSCLE INVOLVEMENT WITHOUT EVIDENCE OF NECROSIS (HCC): ICD-10-CM

## 2024-10-08 DIAGNOSIS — E11.621 DIABETIC ULCER OF LEFT MIDFOOT ASSOCIATED WITH TYPE 2 DIABETES MELLITUS, WITH OTHER ULCER SEVERITY (HCC): Primary | ICD-10-CM

## 2024-10-08 DIAGNOSIS — L97.428 DIABETIC ULCER OF LEFT MIDFOOT ASSOCIATED WITH TYPE 2 DIABETES MELLITUS, WITH OTHER ULCER SEVERITY (HCC): Primary | ICD-10-CM

## 2024-10-08 DIAGNOSIS — M86.672 CHRONIC REFRACTORY OSTEOMYELITIS OF FOOT, LEFT: ICD-10-CM

## 2024-10-08 DIAGNOSIS — E11.621 DIABETIC ULCER OF LEFT MIDFOOT ASSOCIATED WITH TYPE 2 DIABETES MELLITUS, WITH MUSCLE INVOLVEMENT WITHOUT EVIDENCE OF NECROSIS (HCC): ICD-10-CM

## 2024-10-08 DIAGNOSIS — Z89.422 ABSENCE OF TOE OF LEFT FOOT (HCC): Primary | ICD-10-CM

## 2024-10-08 LAB
GLUCOSE BLD-MCNC: 160 MG/DL (ref 74–99)
GLUCOSE BLD-MCNC: 163 MG/DL (ref 74–99)

## 2024-10-08 PROCEDURE — 29581 APPL MULTLAYER CMPRN SYS LEG: CPT

## 2024-10-08 PROCEDURE — 82962 GLUCOSE BLOOD TEST: CPT

## 2024-10-08 RX ORDER — TRIAMCINOLONE ACETONIDE 1 MG/G
OINTMENT TOPICAL ONCE
OUTPATIENT
Start: 2024-10-08 | End: 2024-10-08

## 2024-10-08 RX ORDER — LIDOCAINE HYDROCHLORIDE 20 MG/ML
JELLY TOPICAL ONCE
OUTPATIENT
Start: 2024-10-08 | End: 2024-10-08

## 2024-10-08 RX ORDER — LIDOCAINE HYDROCHLORIDE 40 MG/ML
SOLUTION TOPICAL ONCE
OUTPATIENT
Start: 2024-10-08 | End: 2024-10-08

## 2024-10-08 RX ORDER — LIDOCAINE 50 MG/G
OINTMENT TOPICAL ONCE
OUTPATIENT
Start: 2024-10-08 | End: 2024-10-08

## 2024-10-08 RX ORDER — BETAMETHASONE DIPROPIONATE 0.5 MG/G
CREAM TOPICAL ONCE
OUTPATIENT
Start: 2024-10-08 | End: 2024-10-08

## 2024-10-08 RX ORDER — LIDOCAINE 40 MG/G
CREAM TOPICAL ONCE
OUTPATIENT
Start: 2024-10-08 | End: 2024-10-08

## 2024-10-08 RX ORDER — GENTAMICIN SULFATE 1 MG/G
OINTMENT TOPICAL ONCE
OUTPATIENT
Start: 2024-10-08 | End: 2024-10-08

## 2024-10-08 RX ORDER — SODIUM CHLOR/HYPOCHLOROUS ACID 0.033 %
SOLUTION, IRRIGATION IRRIGATION ONCE
OUTPATIENT
Start: 2024-10-08 | End: 2024-10-08

## 2024-10-08 RX ORDER — MUPIROCIN 20 MG/G
OINTMENT TOPICAL ONCE
OUTPATIENT
Start: 2024-10-08 | End: 2024-10-08

## 2024-10-08 RX ORDER — BACITRACIN ZINC AND POLYMYXIN B SULFATE 500; 1000 [USP'U]/G; [USP'U]/G
OINTMENT TOPICAL ONCE
OUTPATIENT
Start: 2024-10-08 | End: 2024-10-08

## 2024-10-08 RX ORDER — BACITRACIN ZINC 500 [USP'U]/G
OINTMENT TOPICAL ONCE
OUTPATIENT
Start: 2024-10-08 | End: 2024-10-08

## 2024-10-08 RX ORDER — NEOMYCIN/BACITRACIN/POLYMYXINB 3.5-400-5K
OINTMENT (GRAM) TOPICAL ONCE
OUTPATIENT
Start: 2024-10-08 | End: 2024-10-08

## 2024-10-08 RX ORDER — SILVER SULFADIAZINE 10 MG/G
CREAM TOPICAL ONCE
OUTPATIENT
Start: 2024-10-08 | End: 2024-10-08

## 2024-10-08 RX ORDER — CLOBETASOL PROPIONATE 0.5 MG/G
OINTMENT TOPICAL ONCE
OUTPATIENT
Start: 2024-10-08 | End: 2024-10-08

## 2024-10-08 ASSESSMENT — PAIN DESCRIPTION - ORIENTATION
ORIENTATION: OTHER (COMMENT)
ORIENTATION: OTHER (COMMENT)

## 2024-10-08 ASSESSMENT — PAIN DESCRIPTION - LOCATION
LOCATION: OTHER (COMMENT)
LOCATION: OTHER (COMMENT)

## 2024-10-08 ASSESSMENT — PAIN DESCRIPTION - DESCRIPTORS
DESCRIPTORS: SHARP
DESCRIPTORS: SHARP

## 2024-10-08 ASSESSMENT — PAIN SCALES - GENERAL
PAINLEVEL_OUTOF10: 6
PAINLEVEL_OUTOF10: 6

## 2024-10-08 ASSESSMENT — PAIN DESCRIPTION - ONSET
ONSET: ON-GOING
ONSET: ON-GOING

## 2024-10-08 ASSESSMENT — PAIN DESCRIPTION - FREQUENCY
FREQUENCY: CONTINUOUS
FREQUENCY: CONTINUOUS

## 2024-10-08 ASSESSMENT — PAIN DESCRIPTION - PAIN TYPE
TYPE: CHRONIC PAIN
TYPE: CHRONIC PAIN

## 2024-10-08 NOTE — PROGRESS NOTES
at [facility] [place].    In my clinical judgement, ongoing HBO therapy is  necessary at this time, given a threat to patient function, limb or life from the current condition.      Supervision and attendance of Hyperbaric Oxygen Therapy provided.  Continue HBO treatment as outlined in the treatment plan.    Hyperbaric Oxygen: Yovanny Levine tolerated Treatment Number: 10 well today without complications.     Electronically signed by Claudy Forrest DO on 10/8/2024 at 8:39 AM

## 2024-10-08 NOTE — PROGRESS NOTES
Multilayer Compression Wrap   (Not Unna) Below the Knee    NAME:  Yovanny Levine  YOB: 1975  MEDICAL RECORD NUMBER:  [id number]  DATE:  10/8/2024    Multilayer compression wrap: Removed old Multilayer wrap if indicated and wash leg with mild soap/water.  Applied moisturizing agent to dry skin as needed.   Applied primary and secondary dressing as ordered.  Applied multilayered dressing below the knee to left lower leg.  Instructed patient/caregiver not to remove dressing and to keep it clean and dry.   Instructed patient/caregiver on complications to report to provider, such as pain, numbness in toes, heavy drainage, and slippage of dressing.  Instructed patient on purpose of compression dressing and on activity and exercise recommendations.      Electronically signed by Kat Dailey LPN on 10/8/2024 at 11:33 AM

## 2024-10-09 ENCOUNTER — HOSPITAL ENCOUNTER (OUTPATIENT)
Dept: HYPERBARIC MEDICINE | Age: 49
Discharge: HOME | End: 2024-10-09
Payer: MEDICARE

## 2024-10-09 VITALS
DIASTOLIC BLOOD PRESSURE: 84 MMHG | TEMPERATURE: 98.1 F | SYSTOLIC BLOOD PRESSURE: 145 MMHG | RESPIRATION RATE: 16 BRPM | HEART RATE: 72 BPM

## 2024-10-09 DIAGNOSIS — L97.425 DIABETIC ULCER OF LEFT MIDFOOT ASSOCIATED WITH TYPE 2 DIABETES MELLITUS, WITH MUSCLE INVOLVEMENT WITHOUT EVIDENCE OF NECROSIS (HCC): ICD-10-CM

## 2024-10-09 DIAGNOSIS — E11.621 DIABETIC ULCER OF LEFT MIDFOOT ASSOCIATED WITH TYPE 2 DIABETES MELLITUS, WITH MUSCLE INVOLVEMENT WITHOUT EVIDENCE OF NECROSIS (HCC): ICD-10-CM

## 2024-10-09 DIAGNOSIS — M86.672 CHRONIC REFRACTORY OSTEOMYELITIS OF FOOT, LEFT: Primary | ICD-10-CM

## 2024-10-09 DIAGNOSIS — L97.415 DIABETIC ULCER OF RIGHT MIDFOOT ASSOCIATED WITH TYPE 2 DIABETES MELLITUS, WITH MUSCLE INVOLVEMENT WITHOUT EVIDENCE OF NECROSIS (HCC): ICD-10-CM

## 2024-10-09 DIAGNOSIS — E11.621 DIABETIC ULCER OF RIGHT MIDFOOT ASSOCIATED WITH TYPE 2 DIABETES MELLITUS, WITH MUSCLE INVOLVEMENT WITHOUT EVIDENCE OF NECROSIS (HCC): ICD-10-CM

## 2024-10-09 LAB
GLUCOSE BLD-MCNC: 161 MG/DL (ref 74–99)
GLUCOSE BLD-MCNC: 164 MG/DL (ref 74–99)

## 2024-10-09 PROCEDURE — 82962 GLUCOSE BLOOD TEST: CPT

## 2024-10-09 PROCEDURE — G0277 HBOT, FULL BODY CHAMBER, 30M: HCPCS

## 2024-10-09 ASSESSMENT — PAIN DESCRIPTION - LOCATION
LOCATION: OTHER (COMMENT)
LOCATION: OTHER (COMMENT)

## 2024-10-09 ASSESSMENT — PAIN SCALES - GENERAL
PAINLEVEL_OUTOF10: 6
PAINLEVEL_OUTOF10: 6

## 2024-10-09 ASSESSMENT — PAIN DESCRIPTION - ORIENTATION
ORIENTATION: OTHER (COMMENT)
ORIENTATION: OTHER (COMMENT)

## 2024-10-09 ASSESSMENT — PAIN DESCRIPTION - ONSET
ONSET: ON-GOING
ONSET: ON-GOING

## 2024-10-09 ASSESSMENT — PAIN DESCRIPTION - PAIN TYPE
TYPE: CHRONIC PAIN
TYPE: CHRONIC PAIN

## 2024-10-09 ASSESSMENT — PAIN DESCRIPTION - FREQUENCY
FREQUENCY: CONTINUOUS
FREQUENCY: CONTINUOUS

## 2024-10-09 ASSESSMENT — PAIN DESCRIPTION - DESCRIPTORS
DESCRIPTORS: SHARP
DESCRIPTORS: SHARP

## 2024-10-09 NOTE — PROGRESS NOTES
[facility]  Hyperbaric Oxygen Therapy   Progress Note      NAME: Yovanny Levine   MEDICAL RECORD NUMBER:  [id number]  AGE: 49 y.o.   GENDER: male  : 1975  EPISODE DATE:  10/9/2024     Subjective     HBO Treatment Number: 11 out of Total Treatments: 30    HBO Diagnosis:     Indications: Lower Extremity Diabetic Wound ___(site) (left foot)  Dave: Dave 3     Safety checks performed prior to treatment.  See doc flowsheets for documentation.    Objective           Recent Labs     10/09/24  0818 10/09/24  1042   POCGLU 164* 161*       Pre treatment Vital Signs       Temp: 97.8 °F (36.6 °C)     Pulse: 77     Respirations: 16     BP: (!) 189/95 (Noted to provider; Pt. took morning meds  and does not report s/s HTN)     Blood Sugar 164    Post treatment Vital Signs  Temp: 98.1 °F (36.7 °C)  Pulse: 72  Respirations: 16  BP: (!) 145/84 (Noted to provider)  Blood Sugar 161    Assessment        Physical Exam:  General Appearance:  alert and oriented to person, place and time, well-developed and well-nourished, in no acute distress    Pre Tympanic Membrane Assessment:  tympanic membranes intact bilaterally, normal color, normal light reflex bilaterally    Post Tympanic Membrane Assessment:  Right: Normal (No complaints per pt.)  Left: Normal (No complaints per Pt.)    Pulmonary/Chest:  clear to auscultation bilaterally- no wheezes, rales or rhonchi, normal air movement, no respiratory distress    Cardiovascular:  regular rate and rhythm, no murmurs rubs or gallops    Chamber #: [id number]       Treatment Start Time: 0834     Pressure Reached Time: 0848  CAMRON : 2  Number of Air Breaks:  Treatment Status: No Air break      Decompression Time: 1018   Treatment End Time: 1033    Symptoms Noted During Treatment: None    TOTAL TIME AT DEPTH-119    TOTAL TIME IN CHAMBER-90    Adverse Event: no      I was present on these premises and immediately available to furnish assistance & direction throughout the

## 2024-10-15 ENCOUNTER — HOSPITAL ENCOUNTER (OUTPATIENT)
Dept: HYPERBARIC MEDICINE | Age: 49
Discharge: HOME | End: 2024-10-15
Payer: MEDICARE

## 2024-10-15 ENCOUNTER — HOSPITAL ENCOUNTER (OUTPATIENT)
Dept: WOUND CARE | Age: 49
Discharge: HOME | End: 2024-10-15
Attending: STUDENT IN AN ORGANIZED HEALTH CARE EDUCATION/TRAINING PROGRAM
Payer: MEDICARE

## 2024-10-15 VITALS
TEMPERATURE: 97.5 F | RESPIRATION RATE: 20 BRPM | HEART RATE: 86 BPM | SYSTOLIC BLOOD PRESSURE: 144 MMHG | DIASTOLIC BLOOD PRESSURE: 84 MMHG

## 2024-10-15 VITALS
SYSTOLIC BLOOD PRESSURE: 151 MMHG | HEART RATE: 79 BPM | RESPIRATION RATE: 20 BRPM | TEMPERATURE: 98.2 F | DIASTOLIC BLOOD PRESSURE: 88 MMHG

## 2024-10-15 DIAGNOSIS — Z89.422 ABSENCE OF TOE OF LEFT FOOT (HCC): Primary | ICD-10-CM

## 2024-10-15 DIAGNOSIS — E11.621 DIABETIC ULCER OF LEFT MIDFOOT ASSOCIATED WITH TYPE 2 DIABETES MELLITUS, WITH MUSCLE INVOLVEMENT WITHOUT EVIDENCE OF NECROSIS (HCC): ICD-10-CM

## 2024-10-15 DIAGNOSIS — M86.672 CHRONIC REFRACTORY OSTEOMYELITIS OF FOOT, LEFT: Primary | ICD-10-CM

## 2024-10-15 DIAGNOSIS — Z99.2 ESRD (END STAGE RENAL DISEASE) ON DIALYSIS (HCC): ICD-10-CM

## 2024-10-15 DIAGNOSIS — E11.621 DIABETIC ULCER OF RIGHT MIDFOOT ASSOCIATED WITH TYPE 2 DIABETES MELLITUS, WITH MUSCLE INVOLVEMENT WITHOUT EVIDENCE OF NECROSIS (HCC): ICD-10-CM

## 2024-10-15 DIAGNOSIS — L97.415 DIABETIC ULCER OF RIGHT MIDFOOT ASSOCIATED WITH TYPE 2 DIABETES MELLITUS, WITH MUSCLE INVOLVEMENT WITHOUT EVIDENCE OF NECROSIS (HCC): ICD-10-CM

## 2024-10-15 DIAGNOSIS — N18.6 ESRD (END STAGE RENAL DISEASE) ON DIALYSIS (HCC): ICD-10-CM

## 2024-10-15 DIAGNOSIS — L97.425 DIABETIC ULCER OF LEFT MIDFOOT ASSOCIATED WITH TYPE 2 DIABETES MELLITUS, WITH MUSCLE INVOLVEMENT WITHOUT EVIDENCE OF NECROSIS (HCC): ICD-10-CM

## 2024-10-15 PROBLEM — E66.01 MORBID OBESITY WITH BODY MASS INDEX (BMI) OF 40.0 TO 44.9 IN ADULT: Status: RESOLVED | Noted: 2021-05-21 | Resolved: 2024-10-15

## 2024-10-15 LAB
GLUCOSE BLD-MCNC: 155 MG/DL (ref 74–99)
GLUCOSE BLD-MCNC: 171 MG/DL (ref 74–99)

## 2024-10-15 PROCEDURE — 11042 DBRDMT SUBQ TIS 1ST 20SQCM/<: CPT

## 2024-10-15 PROCEDURE — 99183 HYPERBARIC OXYGEN THERAPY: CPT | Performed by: NURSE PRACTITIONER

## 2024-10-15 PROCEDURE — 82962 GLUCOSE BLOOD TEST: CPT

## 2024-10-15 PROCEDURE — G0277 HBOT, FULL BODY CHAMBER, 30M: HCPCS

## 2024-10-15 PROCEDURE — 11042 DBRDMT SUBQ TIS 1ST 20SQCM/<: CPT | Performed by: NURSE PRACTITIONER

## 2024-10-15 RX ORDER — TRIAMCINOLONE ACETONIDE 1 MG/G
OINTMENT TOPICAL ONCE
OUTPATIENT
Start: 2024-10-15 | End: 2024-10-15

## 2024-10-15 RX ORDER — MUPIROCIN 20 MG/G
OINTMENT TOPICAL ONCE
OUTPATIENT
Start: 2024-10-15 | End: 2024-10-15

## 2024-10-15 RX ORDER — LIDOCAINE 40 MG/G
CREAM TOPICAL ONCE
OUTPATIENT
Start: 2024-10-15 | End: 2024-10-15

## 2024-10-15 RX ORDER — LIDOCAINE 50 MG/G
OINTMENT TOPICAL ONCE
OUTPATIENT
Start: 2024-10-15 | End: 2024-10-15

## 2024-10-15 RX ORDER — SODIUM CHLOR/HYPOCHLOROUS ACID 0.033 %
SOLUTION, IRRIGATION IRRIGATION ONCE
OUTPATIENT
Start: 2024-10-15 | End: 2024-10-15

## 2024-10-15 RX ORDER — GENTAMICIN SULFATE 1 MG/G
OINTMENT TOPICAL ONCE
OUTPATIENT
Start: 2024-10-15 | End: 2024-10-15

## 2024-10-15 RX ORDER — BACITRACIN ZINC AND POLYMYXIN B SULFATE 500; 1000 [USP'U]/G; [USP'U]/G
OINTMENT TOPICAL ONCE
OUTPATIENT
Start: 2024-10-15 | End: 2024-10-15

## 2024-10-15 RX ORDER — LIDOCAINE HYDROCHLORIDE 20 MG/ML
JELLY TOPICAL ONCE
OUTPATIENT
Start: 2024-10-15 | End: 2024-10-15

## 2024-10-15 RX ORDER — BETAMETHASONE DIPROPIONATE 0.5 MG/G
CREAM TOPICAL ONCE
OUTPATIENT
Start: 2024-10-15 | End: 2024-10-15

## 2024-10-15 RX ORDER — CLOBETASOL PROPIONATE 0.5 MG/G
OINTMENT TOPICAL ONCE
OUTPATIENT
Start: 2024-10-15 | End: 2024-10-15

## 2024-10-15 RX ORDER — LIDOCAINE HYDROCHLORIDE 40 MG/ML
SOLUTION TOPICAL ONCE
OUTPATIENT
Start: 2024-10-15 | End: 2024-10-15

## 2024-10-15 RX ORDER — NEOMYCIN/BACITRACIN/POLYMYXINB 3.5-400-5K
OINTMENT (GRAM) TOPICAL ONCE
OUTPATIENT
Start: 2024-10-15 | End: 2024-10-15

## 2024-10-15 RX ORDER — BACITRACIN ZINC 500 [USP'U]/G
OINTMENT TOPICAL ONCE
OUTPATIENT
Start: 2024-10-15 | End: 2024-10-15

## 2024-10-15 RX ORDER — SILVER SULFADIAZINE 10 MG/G
CREAM TOPICAL ONCE
OUTPATIENT
Start: 2024-10-15 | End: 2024-10-15

## 2024-10-15 ASSESSMENT — PAIN DESCRIPTION - LOCATION
LOCATION: OTHER (COMMENT)
LOCATION: FOOT
LOCATION: OTHER (COMMENT)

## 2024-10-15 ASSESSMENT — PAIN DESCRIPTION - ORIENTATION
ORIENTATION: OTHER (COMMENT)
ORIENTATION: OTHER (COMMENT)
ORIENTATION: LEFT

## 2024-10-15 ASSESSMENT — PAIN DESCRIPTION - ONSET
ONSET: ON-GOING
ONSET: ON-GOING

## 2024-10-15 ASSESSMENT — PAIN DESCRIPTION - DESCRIPTORS
DESCRIPTORS: SHARP

## 2024-10-15 ASSESSMENT — PAIN DESCRIPTION - PAIN TYPE
TYPE: CHRONIC PAIN
TYPE: CHRONIC PAIN

## 2024-10-15 ASSESSMENT — PAIN DESCRIPTION - FREQUENCY
FREQUENCY: CONTINUOUS

## 2024-10-15 ASSESSMENT — PAIN SCALES - GENERAL
PAINLEVEL_OUTOF10: 7

## 2024-10-15 NOTE — PATIENT INSTRUCTIONS
PHYSICIAN ORDERS AND DISCHARGE INSTRUCTIONS     Wound cleansing:              Do not scrub or use excessive force.             Wash hands with soap and water before and after dressing changes.             Prior to applying a clean dressing, cleanse wound with normal saline,               wound cleanser, or mild soap and water.              Ask the physician or nurse before getting the wound(s) wet in a shower                      Wound Care Notes:  Sees Dr Kumar.                Applied for Kerecis grafts on 24  Donated Kerecis applied to left lateral foot 24                             Orders for this week: 10/15/2024    Wound vac on hold week of 10/1/24    Left Lateral Distal plantar wound: Wash with soap and water, pat dry.  Pack anasept spray dampened hydrofera blue rope (cut in half lengthwise) to depth/tunnel of wound.  Then apply stimulen and puracol ag to remainder of wound bed.  Cover with ca alginate and agile.  Bolster/secure with medipore tape.  Cover with 2 ABD pads.  Wrap with coban 2 full (may use ace wrap if coban 2 full not available).  Change on Tuesday and Friday        Plan: HBO workup started 9/10/24.   [facility] discharge due to not home bound 24.   Toenails 24       Nurse visit Friday Morning  Follow Up Instructions: At the Wound Care Center in 1 weeks.    Primary Wound Care Provider: Shelly Rubalcava CNP  Call  for any questions or concerns.  Central [phone] for imaging lab work

## 2024-10-15 NOTE — PROGRESS NOTES
Wound Care Center Progress Visit      Yovanny Levine  AGE: 49 y.o.   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 male who presents to the Wound Clinic for evaluation and treatment of Chronic diabetic  and  surgical ulcer(s) of the left foot. The condition is of marked severity. The patient has been seeing Dr. Webber at the wound clinic since 24. He was hospitalized -24  Sepsis secondary to left foot cellulitis and chronic left diabetic foot ulcer. General surgery consulted, patient underwent incision and drainage of left foot  and again 2/10/24, IV antibiotics given, home on Augmentin through 3/20/24.  Hospitalized 3/12-3/20/24 with diabetic foot infection, IV antibiotics, switched to oral Cipro. Hospitalized -24 with I&D and foot debridement per Dr. Forrest 24 with wound vac placement. Osteomyelitis, infectious disease consult: plan for vancomycin with dialysis for 6-week cumulative course with end date of 24. The patient has significant underlying medical conditions as below. José Luis Izquierdo MD is PCP.    Wound Pain Timing/Severity: waxing and waning, moderate  Quality of pain: shooting, pins and needles  Severity of pain:  5 / 10   Modifying Factors: diabetes, chronic pressure, shear force, obesity, and non-adherence  Associated Signs/Symptoms: edema and pain    Current wound

## 2024-10-17 ENCOUNTER — HOSPITAL ENCOUNTER (OUTPATIENT)
Dept: HYPERBARIC MEDICINE | Age: 49
Discharge: HOME | End: 2024-10-17
Payer: MEDICARE

## 2024-10-17 VITALS
DIASTOLIC BLOOD PRESSURE: 82 MMHG | RESPIRATION RATE: 16 BRPM | TEMPERATURE: 98.2 F | SYSTOLIC BLOOD PRESSURE: 153 MMHG | HEART RATE: 84 BPM

## 2024-10-17 DIAGNOSIS — M86.672 CHRONIC REFRACTORY OSTEOMYELITIS OF FOOT, LEFT: Primary | ICD-10-CM

## 2024-10-17 DIAGNOSIS — E11.621 DIABETIC ULCER OF RIGHT MIDFOOT ASSOCIATED WITH TYPE 2 DIABETES MELLITUS, WITH MUSCLE INVOLVEMENT WITHOUT EVIDENCE OF NECROSIS (HCC): ICD-10-CM

## 2024-10-17 DIAGNOSIS — L97.415 DIABETIC ULCER OF RIGHT MIDFOOT ASSOCIATED WITH TYPE 2 DIABETES MELLITUS, WITH MUSCLE INVOLVEMENT WITHOUT EVIDENCE OF NECROSIS (HCC): ICD-10-CM

## 2024-10-17 LAB
GLUCOSE BLD-MCNC: 182 MG/DL (ref 74–99)
GLUCOSE BLD-MCNC: 192 MG/DL (ref 74–99)

## 2024-10-17 PROCEDURE — G0277 HBOT, FULL BODY CHAMBER, 30M: HCPCS

## 2024-10-17 PROCEDURE — 82962 GLUCOSE BLOOD TEST: CPT

## 2024-10-17 PROCEDURE — 99183 HYPERBARIC OXYGEN THERAPY: CPT | Performed by: NURSE PRACTITIONER

## 2024-10-17 ASSESSMENT — PAIN DESCRIPTION - ORIENTATION
ORIENTATION: OTHER (COMMENT)
ORIENTATION: OTHER (COMMENT)

## 2024-10-17 ASSESSMENT — PAIN DESCRIPTION - DESCRIPTORS
DESCRIPTORS: SHARP
DESCRIPTORS: SHARP

## 2024-10-17 ASSESSMENT — PAIN SCALES - GENERAL
PAINLEVEL_OUTOF10: 7
PAINLEVEL_OUTOF10: 7

## 2024-10-17 ASSESSMENT — PAIN DESCRIPTION - LOCATION
LOCATION: OTHER (COMMENT)
LOCATION: OTHER (COMMENT)

## 2024-10-17 ASSESSMENT — PAIN DESCRIPTION - PAIN TYPE
TYPE: CHRONIC PAIN
TYPE: CHRONIC PAIN

## 2024-10-17 ASSESSMENT — PAIN DESCRIPTION - ONSET
ONSET: ON-GOING
ONSET: ON-GOING

## 2024-10-17 ASSESSMENT — PAIN DESCRIPTION - FREQUENCY
FREQUENCY: CONTINUOUS
FREQUENCY: CONTINUOUS

## 2024-10-17 NOTE — PROGRESS NOTES
[facility]  Hyperbaric Oxygen Therapy   Progress Note      NAME: Yovanny Levine   MEDICAL RECORD NUMBER:  [id number]  AGE: 49 y.o.   GENDER: male  : 1975  EPISODE DATE:  10/15/2024     Subjective     HBO Treatment Number: 12 out of Total Treatments: 30    HBO Diagnosis:     Indications: Lower Extremity Diabetic Wound ___(site) (left foot)  Dave: Dave 3     Safety checks performed prior to treatment.  See doc flowsheets for documentation.    Objective           Recent Labs     10/15/24  0820 10/15/24  1033   POCGLU 171 155       Pre treatment Vital Signs       Temp: 98 °F (36.7 °C)     Pulse: 86     Respirations: 16     BP: (!) 192/105 (Patient reports morning medications were taken; does not report s/s HTN.)     Blood Sugar 171    Post treatment Vital Signs  Temp: 98.2 °F (36.8 °C)  Pulse: 79  Respirations: 20  BP: (!) 151/88 (Noted to provider)  Blood Sugar 155    Assessment        Physical Exam:  General Appearance:  alert and oriented to person, place and time, well-developed and well-nourished, in no acute distress    Pre Tympanic Membrane Assessment:  tympanic membranes intact bilaterally, normal color    Post Tympanic Membrane Assessment:  Right: Normal (No complaints per pt.)  Left: Normal (No complaints per Pt.)    Pulmonary/Chest:  clear to auscultation bilaterally- no wheezes, rales or rhonchi, normal air movement, no respiratory distress    Cardiovascular:  normal, regular rate and rhythm    Chamber #: [id number]       Treatment Start Time: 0828     Pressure Reached Time: 0841  CAMRON : 2  Number of Air Breaks:  Treatment Status: No Air break      Decompression Time: 1011   Treatment End Time: 1026    Symptoms Noted During Treatment: None    Adverse Event: no      Total time in chamber: 118  Minutes at depth: 90    I was present on these premises and immediately available to furnish assistance & direction throughout the procedure.       Plan          Yovanny Levine is a 49

## 2024-10-17 NOTE — PROGRESS NOTES
[facility]  Hyperbaric Oxygen Therapy   Progress Note      NAME: Yovanny Levine   MEDICAL RECORD NUMBER:  [id number]  AGE: 49 y.o.   GENDER: male  : 1975  EPISODE DATE:  10/17/2024     Subjective     HBO Treatment Number: 13 out of Total Treatments: 30    HBO Diagnosis:     Indications: Lower Extremity Diabetic Wound ___(site) (left foot)  Dave: Dave 3     Safety checks performed prior to treatment.  See doc flowsheets for documentation.    Objective           Recent Labs     10/17/24  0830 10/17/24  1050   POCGLU 182* 192*       Pre treatment Vital Signs       Temp: 98.2 °F (36.8 °C)     Pulse: 88     Respirations: 16     BP: (!) 200/100 (Patient reports took morning meds; will inform provider .)     Blood Sugar 182    Post treatment Vital Signs  Temp: 98.2 °F (36.8 °C)  Pulse: 84  Respirations: 16  BP: (!) 153/82 (Noted to Provider)  Blood Sugar 192    Assessment        Physical Exam:  General Appearance:  alert and oriented to person, place and time, well-developed and well-nourished, in no acute distress    Pre Tympanic Membrane Assessment:  tympanic membranes intact bilaterally    Post Tympanic Membrane Assessment:  Right: Normal (No complaints per Pt.)  Left: Normal (No complaints per Pt.)    Pulmonary/Chest:  clear to auscultation bilaterally- no wheezes, rales or rhonchi, normal air movement, no respiratory distress    Cardiovascular:  normal, regular rate and rhythm    Chamber #: [id number]       Treatment Start Time: 0843     Pressure Reached Time: 0856  CAMRON : 2  Number of Air Breaks:  Treatment Status: No Air break      Decompression Time: 1026   Treatment End Time: 1042    Symptoms Noted During Treatment: None    Adverse Event: no      Total time in chamber: 119  Minutes at depth: 90    I was present on these premises and immediately available to furnish assistance & direction throughout the procedure.       Plan          Yovanny Levine is a 49 y.o. male  did

## 2024-10-21 ENCOUNTER — HOSPITAL ENCOUNTER (OUTPATIENT)
Dept: HYPERBARIC MEDICINE | Age: 49
Discharge: HOME | End: 2024-10-21
Payer: MEDICARE

## 2024-10-21 VITALS
HEART RATE: 77 BPM | DIASTOLIC BLOOD PRESSURE: 88 MMHG | SYSTOLIC BLOOD PRESSURE: 162 MMHG | TEMPERATURE: 98.1 F | RESPIRATION RATE: 20 BRPM

## 2024-10-21 DIAGNOSIS — L97.415 DIABETIC ULCER OF RIGHT MIDFOOT ASSOCIATED WITH TYPE 2 DIABETES MELLITUS, WITH MUSCLE INVOLVEMENT WITHOUT EVIDENCE OF NECROSIS (HCC): ICD-10-CM

## 2024-10-21 DIAGNOSIS — M86.672 CHRONIC REFRACTORY OSTEOMYELITIS OF FOOT, LEFT: Primary | ICD-10-CM

## 2024-10-21 DIAGNOSIS — L97.425 DIABETIC ULCER OF LEFT MIDFOOT ASSOCIATED WITH TYPE 2 DIABETES MELLITUS, WITH MUSCLE INVOLVEMENT WITHOUT EVIDENCE OF NECROSIS (HCC): ICD-10-CM

## 2024-10-21 DIAGNOSIS — E11.621 DIABETIC ULCER OF RIGHT MIDFOOT ASSOCIATED WITH TYPE 2 DIABETES MELLITUS, WITH MUSCLE INVOLVEMENT WITHOUT EVIDENCE OF NECROSIS (HCC): ICD-10-CM

## 2024-10-21 DIAGNOSIS — E11.621 DIABETIC ULCER OF LEFT MIDFOOT ASSOCIATED WITH TYPE 2 DIABETES MELLITUS, WITH MUSCLE INVOLVEMENT WITHOUT EVIDENCE OF NECROSIS (HCC): ICD-10-CM

## 2024-10-21 LAB
GLUCOSE BLD-MCNC: 202 MG/DL (ref 74–99)
GLUCOSE BLD-MCNC: 204 MG/DL (ref 74–99)

## 2024-10-21 PROCEDURE — 99183 HYPERBARIC OXYGEN THERAPY: CPT | Performed by: NURSE PRACTITIONER

## 2024-10-21 PROCEDURE — G0277 HBOT, FULL BODY CHAMBER, 30M: HCPCS

## 2024-10-21 PROCEDURE — 82962 GLUCOSE BLOOD TEST: CPT

## 2024-10-21 ASSESSMENT — PAIN DESCRIPTION - ORIENTATION
ORIENTATION: OTHER (COMMENT)
ORIENTATION: OTHER (COMMENT)

## 2024-10-21 ASSESSMENT — PAIN DESCRIPTION - FREQUENCY
FREQUENCY: CONTINUOUS
FREQUENCY: CONTINUOUS

## 2024-10-21 ASSESSMENT — PAIN SCALES - GENERAL
PAINLEVEL_OUTOF10: 7
PAINLEVEL_OUTOF10: 7

## 2024-10-21 ASSESSMENT — PAIN DESCRIPTION - ONSET
ONSET: ON-GOING
ONSET: ON-GOING

## 2024-10-21 ASSESSMENT — PAIN DESCRIPTION - DESCRIPTORS
DESCRIPTORS: SHARP
DESCRIPTORS: SHARP

## 2024-10-21 ASSESSMENT — PAIN DESCRIPTION - LOCATION
LOCATION: OTHER (COMMENT)
LOCATION: OTHER (COMMENT)

## 2024-10-21 ASSESSMENT — PAIN DESCRIPTION - PAIN TYPE
TYPE: CHRONIC PAIN
TYPE: CHRONIC PAIN

## 2024-10-21 NOTE — PLAN OF CARE
Patient tolerated treatment well, rested on right side with eyes closed without signs of acute distress noted.

## 2024-10-21 NOTE — PROGRESS NOTES
[facility]  Hyperbaric Oxygen Therapy   Progress Note      NAME: Yovanny Levine   MEDICAL RECORD NUMBER:  [id number]  AGE: 49 y.o.   GENDER: male  : 1975  EPISODE DATE:  10/21/2024     Subjective     HBO Treatment Number: 14 out of Total Treatments: 30    HBO Diagnosis:     Indications: Lower Extremity Diabetic Wound ___(site) (left foot)  Dave: Dave 3     Safety checks performed prior to treatment.  See doc flowsheets for documentation.    Objective           Recent Labs     10/21/24  0835 10/21/24  1047   POCGLU 204* 202*       Pre treatment Vital Signs       Temp: 97.7 °F (36.5 °C)     Pulse: 87     Respirations: 16     BP: (!) 190/113 (Reported & Noted to provider; Morning medications taken.)     Blood Sugar 204    Post treatment Vital Signs  Temp: 98.1 °F (36.7 °C)  Pulse: 77  Respirations: 20  BP: (!) 162/88 (Noted to provider)  Blood Sugar 202    Assessment        Physical Exam:  General Appearance:  alert and oriented to person, place and time, well-developed and well-nourished, in no acute distress    Pre Tympanic Membrane Assessment:  tympanic membranes intact bilaterally    Post Tympanic Membrane Assessment:  Right: Normal (No complaints per patient)  Left: Normal (No complaints per Pt.)    Pulmonary/Chest:  clear to auscultation bilaterally- no wheezes, rales or rhonchi, normal air movement, no respiratory distress    Cardiovascular:  normal, regular rate and rhythm    Chamber #: [id number]       Treatment Start Time: 0843     Pressure Reached Time: 0858  CAMRON : 2  Number of Air Breaks:  Treatment Status: No Air break      Decompression Time: 1028   Treatment End Time: 1041    Symptoms Noted During Treatment: None    Adverse Event: no      Total time in chamber: 118  Minutes at depth: 90    I was present on these premises and immediately available to furnish assistance & direction throughout the procedure.       Plan          Yovanny Levine is a 49 y.o. male  did

## 2024-10-21 NOTE — PROGRESS NOTES
RN HYPERBARIC OXYGEN THERAPY RISK ASSESSMENT TOOL   [facility]     Yovanny Levine  MEDICAL RECORD NUMBER:  [id number]  AGE: 49 y.o.   GENDER: male  : 1975  EPISODE DATE:  10/21/2024       PAST MEDICAL HISTORY      Diagnosis Date    Abscess     scrotal    Acute renal failure (ARF) (HCC) 2019    Anxiety associated with depression     Anxiety associated with depression     Back pain 2012    CAD (coronary artery disease) 02/10/2023    Chest pain 2013    Diabetic nephropathy (HCC)     Diabetic neuropathy ([facility]) 2011    Diabetic ulcer of left midfoot associated with type 2 diabetes mellitus, with fat layer exposed ([facility]) 2017    Diverticulosis     C scope + Dr. Medina    DM (diabetes mellitus), type 2 ([facility])     DR. 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 Medina    Nausea and vomiting 2019    Necrotizing fasciitis     Nose fracture 1988    Panic attacks     Pericarditis     Hospitalized with s/p heart cath normal    Peripheral autonomic neuropathy due to diabetes mellitus (HCC)     Axonal EMG- [facility], 2011    Sebaceous cyst 2011    URI (upper respiratory infection) 2012    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 type 2 diabetes mellitus, with muscle involvement without evidence of necrosis ([facility]) 2021    WD-Wound, surgical, infected, initial encounter 2017    Wrist fracture 1986       PAST SURGICAL HISTORY  Past Surgical History:   Procedure Laterality Date    ABDOMEN SURGERY      CARDIAC CATHETERIZATION  ,     Normal (Dr. Winslow)    COLONOSCOPY  2011    Pandiverticulosis, Nonbleeding internal hemorrhoids, Repeat colonoscopy at age 50- Dr. Medina    CORONARY

## 2024-10-22 ENCOUNTER — HOSPITAL ENCOUNTER (OUTPATIENT)
Dept: HYPERBARIC MEDICINE | Age: 49
Discharge: HOME | End: 2024-10-22
Payer: MEDICARE

## 2024-10-22 ENCOUNTER — HOSPITAL ENCOUNTER (OUTPATIENT)
Dept: WOUND CARE | Age: 49
Discharge: HOME | End: 2024-10-22
Attending: STUDENT IN AN ORGANIZED HEALTH CARE EDUCATION/TRAINING PROGRAM
Payer: MEDICARE

## 2024-10-22 VITALS
DIASTOLIC BLOOD PRESSURE: 95 MMHG | TEMPERATURE: 97.8 F | HEART RATE: 73 BPM | SYSTOLIC BLOOD PRESSURE: 160 MMHG | RESPIRATION RATE: 20 BRPM

## 2024-10-22 DIAGNOSIS — L97.425 DIABETIC ULCER OF LEFT MIDFOOT ASSOCIATED WITH TYPE 2 DIABETES MELLITUS, WITH MUSCLE INVOLVEMENT WITHOUT EVIDENCE OF NECROSIS (HCC): ICD-10-CM

## 2024-10-22 DIAGNOSIS — M86.672 CHRONIC REFRACTORY OSTEOMYELITIS OF FOOT, LEFT: Primary | ICD-10-CM

## 2024-10-22 DIAGNOSIS — E11.621 DIABETIC ULCER OF LEFT MIDFOOT ASSOCIATED WITH TYPE 2 DIABETES MELLITUS, WITH MUSCLE INVOLVEMENT WITHOUT EVIDENCE OF NECROSIS (HCC): ICD-10-CM

## 2024-10-22 DIAGNOSIS — E11.621 DIABETIC ULCER OF RIGHT MIDFOOT ASSOCIATED WITH TYPE 2 DIABETES MELLITUS, WITH MUSCLE INVOLVEMENT WITHOUT EVIDENCE OF NECROSIS (HCC): ICD-10-CM

## 2024-10-22 DIAGNOSIS — Z89.422 ABSENCE OF TOE OF LEFT FOOT (HCC): Primary | ICD-10-CM

## 2024-10-22 DIAGNOSIS — L97.415 DIABETIC ULCER OF RIGHT MIDFOOT ASSOCIATED WITH TYPE 2 DIABETES MELLITUS, WITH MUSCLE INVOLVEMENT WITHOUT EVIDENCE OF NECROSIS (HCC): ICD-10-CM

## 2024-10-22 DIAGNOSIS — M79.662 PAIN IN LEFT LOWER LEG: ICD-10-CM

## 2024-10-22 LAB
GLUCOSE BLD-MCNC: 180 MG/DL (ref 74–99)
GLUCOSE BLD-MCNC: 202 MG/DL (ref 74–99)

## 2024-10-22 PROCEDURE — 11042 DBRDMT SUBQ TIS 1ST 20SQCM/<: CPT

## 2024-10-22 PROCEDURE — 82962 GLUCOSE BLOOD TEST: CPT

## 2024-10-22 PROCEDURE — 99183 HYPERBARIC OXYGEN THERAPY: CPT | Performed by: NURSE PRACTITIONER

## 2024-10-22 PROCEDURE — G0277 HBOT, FULL BODY CHAMBER, 30M: HCPCS

## 2024-10-22 RX ORDER — SODIUM CHLOR/HYPOCHLOROUS ACID 0.033 %
SOLUTION, IRRIGATION IRRIGATION ONCE
OUTPATIENT
Start: 2024-10-22 | End: 2024-10-22

## 2024-10-22 RX ORDER — LIDOCAINE 40 MG/G
CREAM TOPICAL ONCE
OUTPATIENT
Start: 2024-10-22 | End: 2024-10-22

## 2024-10-22 RX ORDER — NEOMYCIN/BACITRACIN/POLYMYXINB 3.5-400-5K
OINTMENT (GRAM) TOPICAL ONCE
OUTPATIENT
Start: 2024-10-22 | End: 2024-10-22

## 2024-10-22 RX ORDER — TRIAMCINOLONE ACETONIDE 1 MG/G
OINTMENT TOPICAL ONCE
OUTPATIENT
Start: 2024-10-22 | End: 2024-10-22

## 2024-10-22 RX ORDER — LIDOCAINE HYDROCHLORIDE 20 MG/ML
JELLY TOPICAL ONCE
OUTPATIENT
Start: 2024-10-22 | End: 2024-10-22

## 2024-10-22 RX ORDER — GENTAMICIN SULFATE 1 MG/G
OINTMENT TOPICAL ONCE
OUTPATIENT
Start: 2024-10-22 | End: 2024-10-22

## 2024-10-22 RX ORDER — SILVER SULFADIAZINE 10 MG/G
CREAM TOPICAL ONCE
OUTPATIENT
Start: 2024-10-22 | End: 2024-10-22

## 2024-10-22 RX ORDER — CLOBETASOL PROPIONATE 0.5 MG/G
OINTMENT TOPICAL ONCE
OUTPATIENT
Start: 2024-10-22 | End: 2024-10-22

## 2024-10-22 RX ORDER — LIDOCAINE HYDROCHLORIDE 40 MG/ML
SOLUTION TOPICAL ONCE
OUTPATIENT
Start: 2024-10-22 | End: 2024-10-22

## 2024-10-22 RX ORDER — BACITRACIN ZINC 500 [USP'U]/G
OINTMENT TOPICAL ONCE
OUTPATIENT
Start: 2024-10-22 | End: 2024-10-22

## 2024-10-22 RX ORDER — BETAMETHASONE DIPROPIONATE 0.5 MG/G
CREAM TOPICAL ONCE
OUTPATIENT
Start: 2024-10-22 | End: 2024-10-22

## 2024-10-22 RX ORDER — OXYCODONE AND ACETAMINOPHEN 10; 325 MG/1; MG/1
1 TABLET ORAL EVERY 6 HOURS PRN
Qty: 56 TABLET | Refills: 0 | Status: SHIPPED | OUTPATIENT
Start: 2024-10-22 | End: 2024-11-05

## 2024-10-22 RX ORDER — LIDOCAINE 50 MG/G
OINTMENT TOPICAL ONCE
OUTPATIENT
Start: 2024-10-22 | End: 2024-10-22

## 2024-10-22 RX ORDER — BACITRACIN ZINC AND POLYMYXIN B SULFATE 500; 1000 [USP'U]/G; [USP'U]/G
OINTMENT TOPICAL ONCE
OUTPATIENT
Start: 2024-10-22 | End: 2024-10-22

## 2024-10-22 RX ORDER — MUPIROCIN 20 MG/G
OINTMENT TOPICAL ONCE
OUTPATIENT
Start: 2024-10-22 | End: 2024-10-22

## 2024-10-22 ASSESSMENT — PAIN DESCRIPTION - DESCRIPTORS
DESCRIPTORS: SHARP
DESCRIPTORS: SHARP

## 2024-10-22 ASSESSMENT — PAIN SCALES - GENERAL
PAINLEVEL_OUTOF10: 7
PAINLEVEL_OUTOF10: 7

## 2024-10-22 ASSESSMENT — PAIN DESCRIPTION - FREQUENCY
FREQUENCY: CONTINUOUS
FREQUENCY: CONTINUOUS

## 2024-10-22 ASSESSMENT — PAIN DESCRIPTION - ONSET
ONSET: ON-GOING
ONSET: ON-GOING

## 2024-10-22 ASSESSMENT — PAIN DESCRIPTION - PAIN TYPE
TYPE: CHRONIC PAIN
TYPE: CHRONIC PAIN

## 2024-10-22 ASSESSMENT — PAIN DESCRIPTION - ORIENTATION
ORIENTATION: OTHER (COMMENT)
ORIENTATION: OTHER (COMMENT)

## 2024-10-22 ASSESSMENT — PAIN DESCRIPTION - LOCATION
LOCATION: OTHER (COMMENT)
LOCATION: OTHER (COMMENT)

## 2024-10-22 NOTE — PATIENT INSTRUCTIONS
PHYSICIAN ORDERS AND DISCHARGE INSTRUCTIONS     Wound cleansing:              Do not scrub or use excessive force.             Wash hands with soap and water before and after dressing changes.             Prior to applying a clean dressing, cleanse wound with normal saline,               wound cleanser, or mild soap and water.              Ask the physician or nurse before getting the wound(s) wet in a shower                      Wound Care Notes:  Sees Dr Kumar.                Applied for Kerecis grafts on 24  Donated Kerecis applied to left lateral foot 24                             Orders for this week: 10/22/2024    Wound vac on hold week of 10/1/24    Left Lateral Distal plantar wound: Wash with soap and water, pat dry.  Pack anasept spray dampened hydrofera blue rope (cut in half lengthwise) to depth/tunnel of wound.  Then apply stimulen and puracol ag to remainder of wound bed.  Cover with ca alginate and agile.  Bolster/secure with medipore tape.  Cover with 2 ABD pads.  Wrap with coban 2 full (may use ace wrap if coban 2 full not available).  Change on Tuesday and Friday        Plan: HBO workup started 9/10/24.   [facility] discharge due to not home bound 24.   Toenails 24       Nurse visit Friday Morning  Follow Up Instructions: At the Wound Care Center in 1 weeks.    Primary Wound Care Provider: Shelly Rubalcava CNP  Call  for any questions or concerns.  Central [phone] for imaging lab work

## 2024-10-22 NOTE — PROGRESS NOTES
Multilayer Compression Wrap   (Not Unna) Below the Knee    NAME:  Yovanny Levine  YOB: 1975  MEDICAL RECORD NUMBER:  [id number]  DATE:  10/22/2024    Multilayer compression wrap: Removed old Multilayer wrap if indicated and wash leg with mild soap/water.  Applied moisturizing agent to dry skin as needed.   Applied primary and secondary dressing as ordered.  Applied multilayered dressing below the knee to left lower leg.  Instructed patient/caregiver not to remove dressing and to keep it clean and dry.   Instructed patient/caregiver on complications to report to provider, such as pain, numbness in toes, heavy drainage, and slippage of dressing.  Instructed patient on purpose of compression dressing and on activity and exercise recommendations.      Electronically signed by LYNSEY CASTILLO LPN on 10/22/2024 at 11:53 AM

## 2024-10-25 ENCOUNTER — HOSPITAL ENCOUNTER (OUTPATIENT)
Dept: WOUND CARE | Age: 49
Discharge: HOME | End: 2024-10-25
Attending: STUDENT IN AN ORGANIZED HEALTH CARE EDUCATION/TRAINING PROGRAM
Payer: MEDICARE

## 2024-10-25 ENCOUNTER — HOSPITAL ENCOUNTER (OUTPATIENT)
Dept: HYPERBARIC MEDICINE | Age: 49
Discharge: HOME | End: 2024-10-25
Payer: MEDICARE

## 2024-10-25 VITALS
SYSTOLIC BLOOD PRESSURE: 163 MMHG | DIASTOLIC BLOOD PRESSURE: 94 MMHG | TEMPERATURE: 98 F | RESPIRATION RATE: 20 BRPM | HEART RATE: 76 BPM

## 2024-10-25 DIAGNOSIS — E11.621 DIABETIC ULCER OF RIGHT MIDFOOT ASSOCIATED WITH TYPE 2 DIABETES MELLITUS, WITH MUSCLE INVOLVEMENT WITHOUT EVIDENCE OF NECROSIS (HCC): ICD-10-CM

## 2024-10-25 DIAGNOSIS — E11.621 DIABETIC ULCER OF LEFT MIDFOOT ASSOCIATED WITH TYPE 2 DIABETES MELLITUS, WITH MUSCLE INVOLVEMENT WITHOUT EVIDENCE OF NECROSIS (HCC): ICD-10-CM

## 2024-10-25 DIAGNOSIS — L97.425 DIABETIC ULCER OF LEFT MIDFOOT ASSOCIATED WITH TYPE 2 DIABETES MELLITUS, WITH MUSCLE INVOLVEMENT WITHOUT EVIDENCE OF NECROSIS (HCC): ICD-10-CM

## 2024-10-25 DIAGNOSIS — M86.672 CHRONIC REFRACTORY OSTEOMYELITIS OF FOOT, LEFT: Primary | ICD-10-CM

## 2024-10-25 DIAGNOSIS — L97.415 DIABETIC ULCER OF RIGHT MIDFOOT ASSOCIATED WITH TYPE 2 DIABETES MELLITUS, WITH MUSCLE INVOLVEMENT WITHOUT EVIDENCE OF NECROSIS (HCC): ICD-10-CM

## 2024-10-25 LAB
GLUCOSE BLD-MCNC: 164 MG/DL (ref 74–99)
GLUCOSE BLD-MCNC: 168 MG/DL (ref 74–99)

## 2024-10-25 PROCEDURE — G0277 HBOT, FULL BODY CHAMBER, 30M: HCPCS

## 2024-10-25 PROCEDURE — 82962 GLUCOSE BLOOD TEST: CPT

## 2024-10-25 PROCEDURE — 29581 APPL MULTLAYER CMPRN SYS LEG: CPT

## 2024-10-25 ASSESSMENT — PAIN DESCRIPTION - ONSET
ONSET: ON-GOING
ONSET: ON-GOING

## 2024-10-25 ASSESSMENT — PAIN DESCRIPTION - ORIENTATION
ORIENTATION: OTHER (COMMENT)
ORIENTATION: OTHER (COMMENT)

## 2024-10-25 ASSESSMENT — PAIN SCALES - GENERAL
PAINLEVEL_OUTOF10: 7
PAINLEVEL_OUTOF10: 7

## 2024-10-25 ASSESSMENT — PAIN DESCRIPTION - PAIN TYPE
TYPE: CHRONIC PAIN
TYPE: CHRONIC PAIN

## 2024-10-25 ASSESSMENT — PAIN DESCRIPTION - LOCATION
LOCATION: OTHER (COMMENT)
LOCATION: OTHER (COMMENT)

## 2024-10-25 ASSESSMENT — PAIN DESCRIPTION - DESCRIPTORS
DESCRIPTORS: SHARP
DESCRIPTORS: SHARP

## 2024-10-25 ASSESSMENT — PAIN DESCRIPTION - FREQUENCY
FREQUENCY: CONTINUOUS
FREQUENCY: CONTINUOUS

## 2024-10-25 NOTE — PROGRESS NOTES
Multilayer Compression Wrap   (Not Unna) Below the Knee    NAME:  Yovanny Levine  YOB: 1975  MEDICAL RECORD NUMBER:  [id number]  DATE:  10/25/2024    Multilayer compression wrap: Removed old Multilayer wrap if indicated and wash leg with mild soap/water.  Applied moisturizing agent to dry skin as needed.   Applied primary and secondary dressing as ordered.  Applied multilayered dressing below the knee to left lower leg.  Instructed patient/caregiver not to remove dressing and to keep it clean and dry.   Instructed patient/caregiver on complications to report to provider, such as pain, numbness in toes, heavy drainage, and slippage of dressing.  Instructed patient on purpose of compression dressing and on activity and exercise recommendations.      Electronically signed by Shila Carroll LPN on 10/25/2024 at 11:13 AM

## 2024-10-25 NOTE — PATIENT INSTRUCTIONS
PHYSICIAN ORDERS AND DISCHARGE INSTRUCTIONS     Wound cleansing:              Do not scrub or use excessive force.             Wash hands with soap and water before and after dressing changes.             Prior to applying a clean dressing, cleanse wound with normal saline,               wound cleanser, or mild soap and water.              Ask the physician or nurse before getting the wound(s) wet in a shower                      Wound Care Notes:  Sees Dr Kumar.                Applied for Kerecis grafts on 24  Donated Kerecis applied to left lateral foot 24                             Orders for this week: 10/25/2024    Wound vac on hold week of 10/1/24    Left Lateral Distal plantar wound: Wash with soap and water, pat dry.  Pack anasept spray dampened hydrofera blue rope (cut in half lengthwise) to depth/tunnel of wound.  Then apply stimulen and puracol ag to remainder of wound bed.  Cover with ca alginate and agile.  Bolster/secure with medipore tape.  Cover with 2 ABD pads.  Wrap with coban 2 full (may use ace wrap if coban 2 full not available).  Change on Tuesday and Friday        Plan: HBO workup started 9/10/24.   [facility] discharge due to not home bound 24.   Toenails 24       Nurse visit Friday Morning  Follow Up Instructions: At the Wound Care Center in 1 weeks.    Primary Wound Care Provider: Shelly Rubalcava CNP  Call  for any questions or concerns.  Central [phone] for imaging lab work    21-Jun-2021

## 2024-10-25 NOTE — PROGRESS NOTES
[facility]  Hyperbaric Oxygen Therapy   Progress Note      NAME: Yovanny Levine   MEDICAL RECORD NUMBER:  [id number]  AGE: 49 y.o.   GENDER: male  : 1975  EPISODE DATE:  10/22/2024     Subjective     HBO Treatment Number: 15 out of Total Treatments: 30    HBO Diagnosis:     Indications: Lower Extremity Diabetic Wound ___(site) (left foot)  Dave: Dave 3     Safety checks performed prior to treatment.  See doc flowsheets for documentation.    Objective           Recent Labs     10/25/24  0813 10/25/24  1028   POCGLU 168* 164*       Pre treatment Vital Signs       Temp: 97.8 °F (36.6 °C)     Pulse: 82     Respirations: 16     BP: (!) 191/100 (Noted to provider)     Blood Sugar 168    Post treatment Vital Signs  Temp: 97.8 °F (36.6 °C)  Pulse: 73  Respirations: 20  BP: (!) 160/95 (Noted to Provider)  Blood Sugar 164    Assessment        Physical Exam:  General Appearance:  alert and oriented to person, place and time, well-developed and well-nourished, in no acute distress    Pre Tympanic Membrane Assessment:  tympanic membranes intact bilaterally, normal color    Post Tympanic Membrane Assessment:  Right: Normal (No complaints per Patient)  Left: Normal (No complaints per patient)    Pulmonary/Chest:  clear to auscultation bilaterally- no wheezes, rales or rhonchi, normal air movement, no respiratory distress    Cardiovascular:  normal, regular rate and rhythm    Chamber #: [id number]       Treatment Start Time: 0830     Pressure Reached Time: 0845  CAMRON : 2  Number of Air Breaks:  Treatment Status: No Air break      Decompression Time: 1015   Treatment End Time: 1030    Symptoms Noted During Treatment: None    Adverse Event: no      Total time in chamber:120  Minutes at depth: 90    I was present on these premises and immediately available to furnish assistance & direction throughout the procedure.       Plan          Yovanny Levine is a 49 y.o. male  did successfully complete today's

## 2024-10-29 ENCOUNTER — HOSPITAL ENCOUNTER (OUTPATIENT)
Dept: WOUND CARE | Age: 49
Discharge: HOME | End: 2024-10-29
Attending: STUDENT IN AN ORGANIZED HEALTH CARE EDUCATION/TRAINING PROGRAM
Payer: MEDICARE

## 2024-10-29 ENCOUNTER — HOSPITAL ENCOUNTER (OUTPATIENT)
Dept: HYPERBARIC MEDICINE | Age: 49
Discharge: HOME | End: 2024-10-29
Payer: MEDICARE

## 2024-10-29 VITALS
HEART RATE: 75 BPM | RESPIRATION RATE: 16 BRPM | TEMPERATURE: 98 F | SYSTOLIC BLOOD PRESSURE: 160 MMHG | DIASTOLIC BLOOD PRESSURE: 90 MMHG

## 2024-10-29 VITALS
DIASTOLIC BLOOD PRESSURE: 73 MMHG | RESPIRATION RATE: 16 BRPM | SYSTOLIC BLOOD PRESSURE: 132 MMHG | HEART RATE: 77 BPM | TEMPERATURE: 97.9 F

## 2024-10-29 DIAGNOSIS — L97.513 DIABETIC ULCER OF TOE OF RIGHT FOOT ASSOCIATED WITH TYPE 2 DIABETES MELLITUS, WITH NECROSIS OF MUSCLE (HCC): ICD-10-CM

## 2024-10-29 DIAGNOSIS — E11.621 DIABETIC ULCER OF RIGHT MIDFOOT ASSOCIATED WITH TYPE 2 DIABETES MELLITUS, WITH MUSCLE INVOLVEMENT WITHOUT EVIDENCE OF NECROSIS (HCC): ICD-10-CM

## 2024-10-29 DIAGNOSIS — E11.621 DIABETIC ULCER OF LEFT MIDFOOT ASSOCIATED WITH TYPE 2 DIABETES MELLITUS, WITH MUSCLE INVOLVEMENT WITHOUT EVIDENCE OF NECROSIS (HCC): ICD-10-CM

## 2024-10-29 DIAGNOSIS — Z99.2 ESRD (END STAGE RENAL DISEASE) ON DIALYSIS (HCC): ICD-10-CM

## 2024-10-29 DIAGNOSIS — N18.6 ESRD (END STAGE RENAL DISEASE) ON DIALYSIS (HCC): ICD-10-CM

## 2024-10-29 DIAGNOSIS — M86.672 CHRONIC REFRACTORY OSTEOMYELITIS OF FOOT, LEFT: Primary | ICD-10-CM

## 2024-10-29 DIAGNOSIS — L97.415 DIABETIC ULCER OF RIGHT MIDFOOT ASSOCIATED WITH TYPE 2 DIABETES MELLITUS, WITH MUSCLE INVOLVEMENT WITHOUT EVIDENCE OF NECROSIS (HCC): ICD-10-CM

## 2024-10-29 DIAGNOSIS — L97.425 DIABETIC ULCER OF LEFT MIDFOOT ASSOCIATED WITH TYPE 2 DIABETES MELLITUS, WITH MUSCLE INVOLVEMENT WITHOUT EVIDENCE OF NECROSIS (HCC): ICD-10-CM

## 2024-10-29 DIAGNOSIS — E11.621 DIABETIC ULCER OF TOE OF RIGHT FOOT ASSOCIATED WITH TYPE 2 DIABETES MELLITUS, WITH NECROSIS OF MUSCLE (HCC): ICD-10-CM

## 2024-10-29 LAB
GLUCOSE BLD-MCNC: 171 MG/DL (ref 74–99)
GLUCOSE BLD-MCNC: 177 MG/DL (ref 74–99)

## 2024-10-29 PROCEDURE — 11042 DBRDMT SUBQ TIS 1ST 20SQCM/<: CPT | Performed by: NURSE PRACTITIONER

## 2024-10-29 PROCEDURE — 11042 DBRDMT SUBQ TIS 1ST 20SQCM/<: CPT

## 2024-10-29 PROCEDURE — 99183 HYPERBARIC OXYGEN THERAPY: CPT | Performed by: NURSE PRACTITIONER

## 2024-10-29 PROCEDURE — 82962 GLUCOSE BLOOD TEST: CPT

## 2024-10-29 RX ORDER — SODIUM CHLOR/HYPOCHLOROUS ACID 0.033 %
SOLUTION, IRRIGATION IRRIGATION ONCE
OUTPATIENT
Start: 2024-10-29 | End: 2024-10-29

## 2024-10-29 RX ORDER — LIDOCAINE HYDROCHLORIDE 40 MG/ML
SOLUTION TOPICAL ONCE
OUTPATIENT
Start: 2024-10-29 | End: 2024-10-29

## 2024-10-29 RX ORDER — GENTAMICIN SULFATE 1 MG/G
OINTMENT TOPICAL ONCE
OUTPATIENT
Start: 2024-10-29 | End: 2024-10-29

## 2024-10-29 RX ORDER — BACITRACIN ZINC AND POLYMYXIN B SULFATE 500; 1000 [USP'U]/G; [USP'U]/G
OINTMENT TOPICAL ONCE
OUTPATIENT
Start: 2024-10-29 | End: 2024-10-29

## 2024-10-29 RX ORDER — LIDOCAINE 50 MG/G
OINTMENT TOPICAL ONCE
OUTPATIENT
Start: 2024-10-29 | End: 2024-10-29

## 2024-10-29 RX ORDER — SILVER SULFADIAZINE 10 MG/G
CREAM TOPICAL ONCE
OUTPATIENT
Start: 2024-10-29 | End: 2024-10-29

## 2024-10-29 RX ORDER — CLOBETASOL PROPIONATE 0.5 MG/G
OINTMENT TOPICAL ONCE
OUTPATIENT
Start: 2024-10-29 | End: 2024-10-29

## 2024-10-29 RX ORDER — MUPIROCIN 20 MG/G
OINTMENT TOPICAL ONCE
OUTPATIENT
Start: 2024-10-29 | End: 2024-10-29

## 2024-10-29 RX ORDER — LIDOCAINE HYDROCHLORIDE 20 MG/ML
JELLY TOPICAL ONCE
OUTPATIENT
Start: 2024-10-29 | End: 2024-10-29

## 2024-10-29 RX ORDER — NEOMYCIN/BACITRACIN/POLYMYXINB 3.5-400-5K
OINTMENT (GRAM) TOPICAL ONCE
OUTPATIENT
Start: 2024-10-29 | End: 2024-10-29

## 2024-10-29 RX ORDER — LIDOCAINE 40 MG/G
CREAM TOPICAL ONCE
OUTPATIENT
Start: 2024-10-29 | End: 2024-10-29

## 2024-10-29 RX ORDER — TRIAMCINOLONE ACETONIDE 1 MG/G
OINTMENT TOPICAL ONCE
OUTPATIENT
Start: 2024-10-29 | End: 2024-10-29

## 2024-10-29 RX ORDER — BETAMETHASONE DIPROPIONATE 0.5 MG/G
CREAM TOPICAL ONCE
OUTPATIENT
Start: 2024-10-29 | End: 2024-10-29

## 2024-10-29 RX ORDER — BACITRACIN ZINC 500 [USP'U]/G
OINTMENT TOPICAL ONCE
OUTPATIENT
Start: 2024-10-29 | End: 2024-10-29

## 2024-10-29 ASSESSMENT — PAIN DESCRIPTION - ORIENTATION
ORIENTATION: LEFT
ORIENTATION: OTHER (COMMENT)
ORIENTATION: OTHER (COMMENT)

## 2024-10-29 ASSESSMENT — PAIN SCALES - GENERAL
PAINLEVEL_OUTOF10: 7

## 2024-10-29 ASSESSMENT — PAIN DESCRIPTION - LOCATION
LOCATION: OTHER (COMMENT)
LOCATION: OTHER (COMMENT)
LOCATION: FOOT

## 2024-10-29 ASSESSMENT — PAIN DESCRIPTION - PAIN TYPE
TYPE: CHRONIC PAIN

## 2024-10-29 ASSESSMENT — PAIN DESCRIPTION - ONSET
ONSET: ON-GOING
ONSET: ON-GOING

## 2024-10-29 ASSESSMENT — PAIN DESCRIPTION - DESCRIPTORS
DESCRIPTORS: SHARP

## 2024-10-29 ASSESSMENT — PAIN DESCRIPTION - FREQUENCY
FREQUENCY: CONTINUOUS
FREQUENCY: CONTINUOUS

## 2024-10-29 NOTE — PROGRESS NOTES
times daily 30 tablet 0    [DISCONTINUED] furosemide (LASIX) 40 MG tablet Take 1 tablet by mouth daily 30 tablet 1    [DISCONTINUED] insulin aspart (NOVOLOG) 100 UNIT/ML injection vial Inject 35 Units into the skin 3 times daily (before meals)      [DISCONTINUED] chlorthalidone (HYGROTON) 25 MG tablet Take 1 tablet by mouth daily 30 tablet 3    [DISCONTINUED] linagliptin (TRADJENTA) 5 MG tablet Take 1 tablet by mouth daily 30 tablet 5    Lancets MISC 1 each by Does not apply route daily 100 each 3    glucose monitoring kit (FREESTYLE) monitoring kit 1 each by Does not apply route once for 1 dose. 1 kit 0     No current facility-administered medications on file prior to encounter.       PROBLEM LIST    Patient Active Problem List   Diagnosis    Hiatal hernia    Diabetes mellitus type 2, improved controlled    Diabetic neuropathy (HCC)    Panic attack    Anxiety associated with depression    Neck pain    DANIELA (obstructive sleep apnea)    Urinary frequency    Bilateral carpal tunnel syndrome- left worse than right    Hyperlipidemia with target LDL less than 100    Essential hypertension    Bronchitis    Osteomyelitis    Abscess    Periumbilical hernia    Sepsis (HCC)    Cellulitis of left foot    Constipation    Intractable nausea and vomiting    Uncontrolled type 2 diabetes mellitus    Nausea and vomiting    Diabetic foot infection (HCC)    Acute renal failure (ARF) (HCC)    Cellulitis    Cellulitis of left arm    Cellulitis, scrotum    Acute epididymitis    Irene gangrene    Recurrent major depressive disorder, in full remission (HCC)    Generalized anxiety disorder    Necrotizing fasciitis    Syncope    Right groin wound    Ulcer of right groin with fat layer exposed (HCC)    Diabetic ulcer of left midfoot associated with type 2 diabetes mellitus (HCC)    Nephrotic syndrome    Generalized edema    Stage 3b chronic kidney disease (HCC)    Diabetic nephropathy associated with type 2 diabetes mellitus (HCC)

## 2024-10-29 NOTE — PATIENT INSTRUCTIONS
PHYSICIAN ORDERS AND DISCHARGE INSTRUCTIONS     Wound cleansing:              Do not scrub or use excessive force.             Wash hands with soap and water before and after dressing changes.             Prior to applying a clean dressing, cleanse wound with normal saline,               wound cleanser, or mild soap and water.              Ask the physician or nurse before getting the wound(s) wet in a shower                      Wound Care Notes:  Sees Dr Kumar.                Applied for Kerecis grafts on 24  Donated Kerecis applied to left lateral foot 24                             Orders for this week: 10/29/2024    Wound vac on hold week of 10/1/24    Left Lateral Distal plantar wound: Wash with soap and water, pat dry.  Pack anasept spray dampened hydrofera blue rope (cut in half lengthwise) to depth/tunnel of wound.  Then apply stimulen and puracol ag to remainder of wound bed.  Cover with ca alginate and agile.  Bolster/secure with medipore tape.  Cover with 2 ABD pads.  Wrap with coban 2 full (may use ace wrap if coban 2 full not available).  Change on Tuesday and Friday        Plan: HBO workup started 9/10/24.   [facility] discharge due to not home bound 24.   Toenails 24. Called senait about abx. No answer 10/28/24       Nurse visit Friday Morning  Follow Up Instructions: At the Wound Care Center in 1 weeks.    Primary Wound Care Provider: Shelly Rubalcava CNP  Call  for any questions or concerns.  Central [phone] for imaging lab work

## 2024-10-29 NOTE — PROGRESS NOTES
[facility]  Hyperbaric Oxygen Therapy   Progress Note      NAME: Yovanny Levine   MEDICAL RECORD NUMBER:  [id number]  AGE: 49 y.o.   GENDER: male  : 1975  EPISODE DATE:  10/29/2024     Subjective     HBO Treatment Number: 17 out of Total Treatments: 30    HBO Diagnosis:     Indications: Lower Extremity Diabetic Wound ___(site) (Left foot)  Dave: Dave 3     Safety checks performed prior to treatment.  See doc flowsheets for documentation.    Objective           Recent Labs     10/29/24  0814 10/29/24  1022   POCGLU 177* 171*       Pre treatment Vital Signs       Temp: 97.9 °F (36.6 °C)     Pulse: 85     Respirations: 16     BP: (!) 193/107 (Noted to provider; Patient took morning medications; Pt. does not report s/s of HTN)     Blood Sugar 117    Post treatment Vital Signs  Temp: 98 °F (36.7 °C)  Pulse: 75  Respirations: 16  BP: (!) 160/90 (Noted to Provider)  Blood Sugar 171      Assessment        Physical Exam:  General Appearance:  alert and oriented to person, place and time, well-developed and well-nourished, in no acute distress    Pre Tympanic Membrane Assessment:  tympanic membranes intact bilaterally    Post Tympanic Membrane Assessment:  Right: Normal (No complaints reported per patient)  Left: Normal (No complaints reported per patient)    Pulmonary/Chest:  clear to auscultation bilaterally- no wheezes, rales or rhonchi, normal air movement, no respiratory distress    Cardiovascular:  normal, regular rate and rhythm    Chamber #: [id number]       Treatment Start Time: 0829     Pressure Reached Time: 0843  CAMRON : 2  Number of Air Breaks:  Treatment Status: No Air break      Decompression Time: 1003   Treatment End Time: 1017    Symptoms Noted During Treatment: None    Adverse Event: no      Total time in chamber: 108  Minutes at depth: 80    I was present on these premises and immediately available to furnish assistance & direction throughout the procedure.       Plan

## 2024-11-04 ENCOUNTER — HOSPITAL ENCOUNTER (OUTPATIENT)
Age: 49
Setting detail: SPECIMEN
Discharge: HOME | End: 2024-11-04
Payer: MEDICARE

## 2024-11-04 LAB
ANION GAP SERPL CALCULATED.3IONS-SCNC: 21 MMOL/L (ref 9–17)
BUN SERPL-MCNC: 90 MG/DL (ref 7–20)
CALCIUM SERPL-MCNC: 8.3 MG/DL (ref 8.3–10.6)
CHLORIDE SERPL-SCNC: 96 MMOL/L (ref 99–110)
CO2 SERPL-SCNC: 21 MMOL/L (ref 21–32)
CREAT SERPL-MCNC: 9.4 MG/DL (ref 0.9–1.3)
ERYTHROCYTE [DISTWIDTH] IN BLOOD BY AUTOMATED COUNT: 16.7 % (ref 11.7–14.9)
GFR, ESTIMATED: 6 ML/MIN/1.73M2
GLUCOSE SERPL-MCNC: 117 MG/DL (ref 74–99)
HCT VFR BLD AUTO: 33.1 % (ref 42–52)
HGB BLD-MCNC: 10.5 G/DL (ref 13.5–18)
MCH RBC QN AUTO: 29.2 PG (ref 27–31)
MCHC RBC AUTO-ENTMCNC: 31.7 G/DL (ref 32–36)
MCV RBC AUTO: 91.9 FL (ref 78–100)
PLATELET # BLD AUTO: 154 K/UL (ref 140–440)
PMV BLD AUTO: 10.5 FL (ref 7.5–11.1)
POTASSIUM SERPL-SCNC: 5.2 MMOL/L (ref 3.5–5.1)
RBC # BLD AUTO: 3.6 M/UL (ref 4.6–6.2)
SODIUM SERPL-SCNC: 138 MMOL/L (ref 136–145)
WBC OTHER # BLD: 7.7 K/UL (ref 4–10.5)

## 2024-11-04 PROCEDURE — 80048 BASIC METABOLIC PNL TOTAL CA: CPT

## 2024-11-04 PROCEDURE — 85027 COMPLETE CBC AUTOMATED: CPT

## 2024-11-05 ENCOUNTER — HOSPITAL ENCOUNTER (OUTPATIENT)
Dept: WOUND CARE | Age: 49
Discharge: HOME | End: 2024-11-05
Attending: STUDENT IN AN ORGANIZED HEALTH CARE EDUCATION/TRAINING PROGRAM
Payer: MEDICARE

## 2024-11-05 ENCOUNTER — HOSPITAL ENCOUNTER (OUTPATIENT)
Dept: HYPERBARIC MEDICINE | Age: 49
Discharge: HOME | End: 2024-11-05
Payer: MEDICARE

## 2024-11-05 VITALS
DIASTOLIC BLOOD PRESSURE: 95 MMHG | SYSTOLIC BLOOD PRESSURE: 175 MMHG | TEMPERATURE: 98.3 F | RESPIRATION RATE: 16 BRPM | HEART RATE: 84 BPM

## 2024-11-05 DIAGNOSIS — M79.662 PAIN IN LEFT LOWER LEG: ICD-10-CM

## 2024-11-05 DIAGNOSIS — E11.621 DIABETIC ULCER OF MIDFOOT ASSOCIATED WITH TYPE 2 DIABETES MELLITUS, WITH MUSCLE INVOLVEMENT WITHOUT EVIDENCE OF NECROSIS, UNSPECIFIED LATERALITY (HCC): ICD-10-CM

## 2024-11-05 DIAGNOSIS — E11.621 DIABETIC ULCER OF RIGHT MIDFOOT ASSOCIATED WITH TYPE 2 DIABETES MELLITUS, WITH MUSCLE INVOLVEMENT WITHOUT EVIDENCE OF NECROSIS (HCC): ICD-10-CM

## 2024-11-05 DIAGNOSIS — Z89.422 ABSENCE OF TOE OF LEFT FOOT (HCC): Primary | ICD-10-CM

## 2024-11-05 DIAGNOSIS — N18.6 ESRD (END STAGE RENAL DISEASE) ON DIALYSIS (HCC): ICD-10-CM

## 2024-11-05 DIAGNOSIS — Z99.2 ESRD (END STAGE RENAL DISEASE) ON DIALYSIS (HCC): ICD-10-CM

## 2024-11-05 DIAGNOSIS — L97.415 DIABETIC ULCER OF RIGHT MIDFOOT ASSOCIATED WITH TYPE 2 DIABETES MELLITUS, WITH MUSCLE INVOLVEMENT WITHOUT EVIDENCE OF NECROSIS (HCC): ICD-10-CM

## 2024-11-05 DIAGNOSIS — L97.405 DIABETIC ULCER OF MIDFOOT ASSOCIATED WITH TYPE 2 DIABETES MELLITUS, WITH MUSCLE INVOLVEMENT WITHOUT EVIDENCE OF NECROSIS, UNSPECIFIED LATERALITY (HCC): ICD-10-CM

## 2024-11-05 DIAGNOSIS — M86.672 CHRONIC REFRACTORY OSTEOMYELITIS OF FOOT, LEFT: Primary | ICD-10-CM

## 2024-11-05 LAB
GLUCOSE BLD-MCNC: 153 MG/DL (ref 74–99)
GLUCOSE BLD-MCNC: 153 MG/DL (ref 74–99)

## 2024-11-05 PROCEDURE — 99183 HYPERBARIC OXYGEN THERAPY: CPT | Performed by: NURSE PRACTITIONER

## 2024-11-05 PROCEDURE — 11042 DBRDMT SUBQ TIS 1ST 20SQCM/<: CPT | Performed by: NURSE PRACTITIONER

## 2024-11-05 PROCEDURE — 11042 DBRDMT SUBQ TIS 1ST 20SQCM/<: CPT

## 2024-11-05 PROCEDURE — 82962 GLUCOSE BLOOD TEST: CPT

## 2024-11-05 PROCEDURE — G0277 HBOT, FULL BODY CHAMBER, 30M: HCPCS

## 2024-11-05 RX ORDER — MUPIROCIN 20 MG/G
OINTMENT TOPICAL ONCE
OUTPATIENT
Start: 2024-11-05 | End: 2024-11-05

## 2024-11-05 RX ORDER — NEOMYCIN/BACITRACIN/POLYMYXINB 3.5-400-5K
OINTMENT (GRAM) TOPICAL ONCE
OUTPATIENT
Start: 2024-11-05 | End: 2024-11-05

## 2024-11-05 RX ORDER — BETAMETHASONE DIPROPIONATE 0.5 MG/G
CREAM TOPICAL ONCE
OUTPATIENT
Start: 2024-11-05 | End: 2024-11-05

## 2024-11-05 RX ORDER — BACITRACIN ZINC 500 [USP'U]/G
OINTMENT TOPICAL ONCE
OUTPATIENT
Start: 2024-11-05 | End: 2024-11-05

## 2024-11-05 RX ORDER — LIDOCAINE HYDROCHLORIDE 20 MG/ML
JELLY TOPICAL ONCE
OUTPATIENT
Start: 2024-11-05 | End: 2024-11-05

## 2024-11-05 RX ORDER — CLOBETASOL PROPIONATE 0.5 MG/G
OINTMENT TOPICAL ONCE
OUTPATIENT
Start: 2024-11-05 | End: 2024-11-05

## 2024-11-05 RX ORDER — TRIAMCINOLONE ACETONIDE 1 MG/G
OINTMENT TOPICAL ONCE
OUTPATIENT
Start: 2024-11-05 | End: 2024-11-05

## 2024-11-05 RX ORDER — LIDOCAINE 40 MG/G
CREAM TOPICAL ONCE
OUTPATIENT
Start: 2024-11-05 | End: 2024-11-05

## 2024-11-05 RX ORDER — GENTAMICIN SULFATE 1 MG/G
OINTMENT TOPICAL ONCE
OUTPATIENT
Start: 2024-11-05 | End: 2024-11-05

## 2024-11-05 RX ORDER — SODIUM CHLOR/HYPOCHLOROUS ACID 0.033 %
SOLUTION, IRRIGATION IRRIGATION ONCE
OUTPATIENT
Start: 2024-11-05 | End: 2024-11-05

## 2024-11-05 RX ORDER — OXYCODONE AND ACETAMINOPHEN 10; 325 MG/1; MG/1
1 TABLET ORAL EVERY 6 HOURS PRN
Qty: 56 TABLET | Refills: 0 | Status: SHIPPED | OUTPATIENT
Start: 2024-11-05 | End: 2024-11-19

## 2024-11-05 RX ORDER — LIDOCAINE 50 MG/G
OINTMENT TOPICAL ONCE
OUTPATIENT
Start: 2024-11-05 | End: 2024-11-05

## 2024-11-05 RX ORDER — BACITRACIN ZINC AND POLYMYXIN B SULFATE 500; 1000 [USP'U]/G; [USP'U]/G
OINTMENT TOPICAL ONCE
OUTPATIENT
Start: 2024-11-05 | End: 2024-11-05

## 2024-11-05 RX ORDER — SILVER SULFADIAZINE 10 MG/G
CREAM TOPICAL ONCE
OUTPATIENT
Start: 2024-11-05 | End: 2024-11-05

## 2024-11-05 RX ORDER — LIDOCAINE HYDROCHLORIDE 40 MG/ML
SOLUTION TOPICAL ONCE
OUTPATIENT
Start: 2024-11-05 | End: 2024-11-05

## 2024-11-05 ASSESSMENT — PAIN DESCRIPTION - PAIN TYPE: TYPE: CHRONIC PAIN

## 2024-11-05 ASSESSMENT — PAIN DESCRIPTION - ONSET: ONSET: ON-GOING

## 2024-11-05 ASSESSMENT — PAIN SCALES - GENERAL: PAINLEVEL_OUTOF10: 7

## 2024-11-05 ASSESSMENT — PAIN DESCRIPTION - LOCATION: LOCATION: OTHER (COMMENT)

## 2024-11-05 ASSESSMENT — PAIN DESCRIPTION - FREQUENCY: FREQUENCY: CONTINUOUS

## 2024-11-05 ASSESSMENT — PAIN DESCRIPTION - ORIENTATION: ORIENTATION: OTHER (COMMENT)

## 2024-11-05 ASSESSMENT — PAIN - FUNCTIONAL ASSESSMENT: PAIN_FUNCTIONAL_ASSESSMENT: PREVENTS OR INTERFERES SOME ACTIVE ACTIVITIES AND ADLS

## 2024-11-05 ASSESSMENT — PAIN DESCRIPTION - DESCRIPTORS: DESCRIPTORS: SHARP

## 2024-11-05 NOTE — PATIENT INSTRUCTIONS
PHYSICIAN ORDERS AND DISCHARGE INSTRUCTIONS     Wound cleansing:              Do not scrub or use excessive force.             Wash hands with soap and water before and after dressing changes.             Prior to applying a clean dressing, cleanse wound with normal saline,               wound cleanser, or mild soap and water.              Ask the physician or nurse before getting the wound(s) wet in a shower                      Wound Care Notes:  Sees Dr Kumar.                Applied for Kerecis grafts on 24  Donated Kerecis applied to left lateral foot 24                             Orders for this week: 2024    Wound vac on hold week of 10/1/24    Left Lateral Distal plantar wound: Wash with soap and water, pat dry.  Paint with betadine.  Tuck Puracol Ag to wound bed (and in fold of skin).  Bolster/secure with medipore tape.  Cover with ca alginate and agile.  Cover with 2 ABD pads.  Wrap with coban 2 full (may use ace wrap if coban 2 full not available).  Change on Tuesday and Friday        Plan: HBO workup started 9/10/24.   [facility] discharge due to not home bound 24.   Toenails 24. Called senait about abx. No answer 10/28/24       Nurse visit Friday Morning  Follow Up Instructions: At the Wound Care Center in 1 weeks.    Primary Wound Care Provider: Shelly Rubalcava CNP  Call  for any questions or concerns.  Central [phone] for imaging lab work

## 2024-11-05 NOTE — PROGRESS NOTES
[facility]  Hyperbaric Oxygen Therapy   Progress Note      NAME: Yovanny Levine   MEDICAL RECORD NUMBER:  [id number]  AGE: 49 y.o.   GENDER: male  : 1975  EPISODE DATE:  2024     Subjective     HBO Treatment Number: 18 out of Total Treatments: 30    HBO Diagnosis:     Indications: Lower Extremity Diabetic Wound ___(site) (left foot)  Dave: Dave 3     Safety checks performed prior to treatment.  See doc flowsheets for documentation.    Objective           Recent Labs     24  0807 24  1019   POCGLU 153* 153*       Pre treatment Vital Signs       Temp: 98.3 °F (36.8 °C)     Pulse: 84     Respirations: 16     BP: (!) 175/95 (Noted to Provider, Patient took B/P Med this morning)     Blood Sugar 153    Post treatment Vital Signs  Temp: 98.3 °F (36.8 °C)  Pulse: 84  Respirations: 16  BP: (!) 175/95 (Noted to Provider, Patient took B/P Med this morning)  Blood Sugar 153    Assessment        Physical Exam:  General Appearance:  alert and oriented to person, place and time, well-developed and well-nourished, in no acute distress    Pre Tympanic Membrane Assessment:  tympanic membranes intact bilaterally    Post Tympanic Membrane Assessment:  Right: Normal (No complaints per patient)  Left: Normal (No complaints per patient)    Pulmonary/Chest:  clear to auscultation bilaterally- no wheezes, rales or rhonchi, normal air movement, no respiratory distress    Cardiovascular:  normal, regular rate and rhythm    Chamber #: [id number]       Treatment Start Time: 0817     Pressure Reached Time: 0830  CAMRON : 2  Number of Air Breaks:  Treatment Status: No Air break      Decompression Time: 1000   Treatment End Time: 1014    Symptoms Noted During Treatment: None    Adverse Event: no      Total time in chamber: 117  Minutes at depth: 90    I was present on these premises and immediately available to furnish assistance & direction throughout the procedure.       Tania HAWKINS

## 2024-11-05 NOTE — WOUND CARE
Multilayer Compression Wrap   (Not Unna) Below the Knee    NAME:  Yovanny Levine  YOB: 1975  MEDICAL RECORD NUMBER:  [id number]  DATE:  11/5/2024    Multilayer compression wrap: Removed old Multilayer wrap if indicated and wash leg with mild soap/water.  Applied moisturizing agent to dry skin as needed.   Applied primary and secondary dressing as ordered.  Applied multilayered dressing below the knee to left lower leg.  Instructed patient/caregiver not to remove dressing and to keep it clean and dry.   Instructed patient/caregiver on complications to report to provider, such as pain, numbness in toes, heavy drainage, and slippage of dressing.  Instructed patient on purpose of compression dressing and on activity and exercise recommendations.      Electronically signed by Rosie Carpio LPN on 11/5/2024 at 11:37 AM

## 2024-11-05 NOTE — PROGRESS NOTES
.Multilayer Compression Wrap   (Not Unna) Below the Knee    NAME:  Yovanny Levine  YOB: 1975  MEDICAL RECORD NUMBER:  [id number]  DATE:  11/5/2024    Multilayer compression wrap: Removed old Multilayer wrap if indicated and wash leg with mild soap/water.  Applied moisturizing agent to dry skin as needed.   Applied primary and secondary dressing as ordered.  Applied multilayered dressing below the knee to left lower leg.  Instructed patient/caregiver not to remove dressing and to keep it clean and dry.   Instructed patient/caregiver on complications to report to provider, such as pain, numbness in toes, heavy drainage, and slippage of dressing.  Instructed patient on purpose of compression dressing and on activity and exercise recommendations.      Electronically signed by Letha Goodman RN on 11/5/2024 at 11:30 AM   
nephropathy associated with type 2 diabetes mellitus (HCC)    Hypoalbuminemia    Chronic kidney disease, stage IV (severe) (HCC)    FH: CAD (coronary artery disease)    ASCVD (arteriosclerotic cardiovascular disease)    Other proteinuria    Chest pain    Surgical wound dehiscence    CARMEN (acute kidney injury) (HCC)    Anemia    Chest tightness    NSTEMI (non-ST elevated myocardial infarction) (HCC)    Diabetic foot ulcer with osteomyelitis (HCC)    ESRD (end stage renal disease) (HCC)    Problem with vascular access    Acute on chronic respiratory failure with hypoxia    CAD (coronary artery disease)    Volume overload    Gangrene (HCC)    Acute osteomyelitis of metatarsal bone of left foot    Open wound of plantar aspect of left foot    Diabetic ulcer of left midfoot associated with diabetes mellitus due to underlying condition, with fat layer exposed (HCC)    Open wound of foot, left, sequela    Chronic foot pain, left    Absence of toe of left foot (HCC)    Hyperkalemia    ESRD (end stage renal disease) on dialysis (HCC)    Diabetic ulcer of toe of right foot associated with type 2 diabetes mellitus, with necrosis of muscle (HCC)    Diabetic ulcer of midfoot Dave 3 associated with type 2 diabetes mellitus, with muscle involvement without evidence of necrosis (HCC)    Acute pain of left knee    Pain in left lower leg    Chronic refractory osteomyelitis of foot, left       REVIEW OF SYSTEMS      Constitutional: negative for anorexia, chills, fatigue, fevers, malaise, and sweats  Respiratory: negative for cough, hemoptysis, pleurisy/chest pain, sputum, and stridor  Cardiovascular: negative for chest pain, chest pressure/discomfort, exertional chest pressure/discomfort, near-syncope, orthopnea, palpitations, paroxysmal nocturnal dyspnea, syncope, and tachypnea  Integument/breast: positive for skin lesion(s)        Objective:      There were no vitals taken for this visit.    BP Readings from Last 3 Encounters:

## 2024-11-11 ENCOUNTER — APPOINTMENT (OUTPATIENT)
Dept: CT IMAGING | Age: 49
DRG: 640 | End: 2024-11-11
Attending: STUDENT IN AN ORGANIZED HEALTH CARE EDUCATION/TRAINING PROGRAM
Payer: MEDICARE

## 2024-11-11 ENCOUNTER — HOSPITAL ENCOUNTER (INPATIENT)
Age: 49
LOS: 1 days | Discharge: HOME | DRG: 640 | End: 2024-11-15
Attending: STUDENT IN AN ORGANIZED HEALTH CARE EDUCATION/TRAINING PROGRAM | Admitting: STUDENT IN AN ORGANIZED HEALTH CARE EDUCATION/TRAINING PROGRAM
Payer: MEDICARE

## 2024-11-11 DIAGNOSIS — N18.6 ESRD (END STAGE RENAL DISEASE) (HCC): ICD-10-CM

## 2024-11-11 DIAGNOSIS — E87.70 HYPERVOLEMIA, UNSPECIFIED HYPERVOLEMIA TYPE: ICD-10-CM

## 2024-11-11 DIAGNOSIS — J96.01 ACUTE RESPIRATORY FAILURE WITH HYPOXIA: Primary | ICD-10-CM

## 2024-11-11 LAB
ALBUMIN SERPL-MCNC: 4.1 G/DL (ref 3.4–5)
ALBUMIN/GLOB SERPL: 1.3 {RATIO} (ref 1.1–2.2)
ALP SERPL-CCNC: 79 U/L (ref 40–129)
ALT SERPL-CCNC: <5 U/L (ref 10–40)
ANION GAP SERPL CALCULATED.3IONS-SCNC: 21 MMOL/L (ref 9–17)
AST SERPL-CCNC: 15 U/L (ref 15–37)
BASOPHILS # BLD: 0.04 K/UL
BASOPHILS NFR BLD: 1 % (ref 0–1)
BILIRUB SERPL-MCNC: 0.5 MG/DL (ref 0–1)
BUN SERPL-MCNC: 79 MG/DL (ref 7–20)
CALCIUM SERPL-MCNC: 8.5 MG/DL (ref 8.3–10.6)
CHLORIDE SERPL-SCNC: 94 MMOL/L (ref 99–110)
CO2 SERPL-SCNC: 23 MMOL/L (ref 21–32)
CREAT SERPL-MCNC: 8.6 MG/DL (ref 0.9–1.3)
EOSINOPHIL # BLD: 0.19 K/UL
EOSINOPHILS RELATIVE PERCENT: 3 % (ref 0–3)
ERYTHROCYTE [DISTWIDTH] IN BLOOD BY AUTOMATED COUNT: 16.4 % (ref 11.7–14.9)
GFR, ESTIMATED: 7 ML/MIN/1.73M2
GLUCOSE BLD-MCNC: 207 MG/DL (ref 74–99)
GLUCOSE SERPL-MCNC: 177 MG/DL (ref 74–99)
HCT VFR BLD AUTO: 31.3 % (ref 42–52)
HGB BLD-MCNC: 10 G/DL (ref 13.5–18)
IMM GRANULOCYTES # BLD AUTO: 0.03 K/UL
IMM GRANULOCYTES NFR BLD: 0 %
LYMPHOCYTES NFR BLD: 0.52 K/UL
LYMPHOCYTES RELATIVE PERCENT: 8 % (ref 24–44)
MCH RBC QN AUTO: 29.3 PG (ref 27–31)
MCHC RBC AUTO-ENTMCNC: 31.9 G/DL (ref 32–36)
MCV RBC AUTO: 91.8 FL (ref 78–100)
MONOCYTES NFR BLD: 0.56 K/UL
MONOCYTES NFR BLD: 8 % (ref 0–4)
NEUTROPHILS NFR BLD: 80 % (ref 36–66)
NEUTS SEG NFR BLD: 5.42 K/UL
PLATELET # BLD AUTO: 133 K/UL (ref 140–440)
PMV BLD AUTO: 11.1 FL (ref 7.5–11.1)
POTASSIUM SERPL-SCNC: 4.5 MMOL/L (ref 3.5–5.1)
PROT SERPL-MCNC: 7.3 G/DL (ref 6.4–8.2)
RBC # BLD AUTO: 3.41 M/UL (ref 4.6–6.2)
SODIUM SERPL-SCNC: 138 MMOL/L (ref 136–145)
TROPONIN I SERPL HS-MCNC: 143 NG/L (ref 0–22)
TROPONIN I SERPL HS-MCNC: 147 NG/L (ref 0–22)
WBC OTHER # BLD: 6.8 K/UL (ref 4–10.5)

## 2024-11-11 PROCEDURE — G0378 HOSPITAL OBSERVATION PER HR: HCPCS

## 2024-11-11 PROCEDURE — 85025 COMPLETE CBC W/AUTO DIFF WBC: CPT

## 2024-11-11 PROCEDURE — 6370000000 HC RX 637 (ALT 250 FOR IP): Performed by: STUDENT IN AN ORGANIZED HEALTH CARE EDUCATION/TRAINING PROGRAM

## 2024-11-11 PROCEDURE — 84484 ASSAY OF TROPONIN QUANT: CPT

## 2024-11-11 PROCEDURE — 5A1D70Z PERFORMANCE OF URINARY FILTRATION, INTERMITTENT, LESS THAN 6 HOURS PER DAY: ICD-10-PCS | Performed by: INTERNAL MEDICINE

## 2024-11-11 PROCEDURE — 0W993ZZ DRAINAGE OF RIGHT PLEURAL CAVITY, PERCUTANEOUS APPROACH: ICD-10-PCS | Performed by: RADIOLOGY

## 2024-11-11 PROCEDURE — 99285 EMERGENCY DEPT VISIT HI MDM: CPT

## 2024-11-11 PROCEDURE — 93005 ELECTROCARDIOGRAM TRACING: CPT | Performed by: STUDENT IN AN ORGANIZED HEALTH CARE EDUCATION/TRAINING PROGRAM

## 2024-11-11 PROCEDURE — 6360000004 HC RX CONTRAST MEDICATION: Performed by: STUDENT IN AN ORGANIZED HEALTH CARE EDUCATION/TRAINING PROGRAM

## 2024-11-11 PROCEDURE — 82962 GLUCOSE BLOOD TEST: CPT

## 2024-11-11 PROCEDURE — 80053 COMPREHEN METABOLIC PANEL: CPT

## 2024-11-11 PROCEDURE — 71260 CT THORAX DX C+: CPT

## 2024-11-11 PROCEDURE — 90935 HEMODIALYSIS ONE EVALUATION: CPT

## 2024-11-11 RX ORDER — INSULIN LISPRO 100 [IU]/ML
0-8 INJECTION, SOLUTION INTRAVENOUS; SUBCUTANEOUS
Status: DISCONTINUED | OUTPATIENT
Start: 2024-11-11 | End: 2024-11-15 | Stop reason: HOSPADM

## 2024-11-11 RX ORDER — IOPAMIDOL 755 MG/ML
80 INJECTION, SOLUTION INTRAVASCULAR
Status: COMPLETED | OUTPATIENT
Start: 2024-11-11 | End: 2024-11-11

## 2024-11-11 RX ORDER — OXYCODONE AND ACETAMINOPHEN 10; 325 MG/1; MG/1
1 TABLET ORAL EVERY 6 HOURS PRN
Status: DISCONTINUED | OUTPATIENT
Start: 2024-11-11 | End: 2024-11-11 | Stop reason: SDUPTHER

## 2024-11-11 RX ORDER — CALCIUM CARBONATE 500 MG/1
2 TABLET, CHEWABLE ORAL DAILY
COMMUNITY

## 2024-11-11 RX ORDER — OXYCODONE AND ACETAMINOPHEN 7.5; 325 MG/1; MG/1
1 TABLET ORAL EVERY 6 HOURS PRN
Status: DISCONTINUED | OUTPATIENT
Start: 2024-11-11 | End: 2024-11-11

## 2024-11-11 RX ORDER — ALBUTEROL SULFATE 90 UG/1
2 INHALANT RESPIRATORY (INHALATION) 4 TIMES DAILY PRN
Status: DISCONTINUED | OUTPATIENT
Start: 2024-11-11 | End: 2024-11-15 | Stop reason: HOSPADM

## 2024-11-11 RX ORDER — OXYCODONE HYDROCHLORIDE 5 MG/1
5 TABLET ORAL EVERY 6 HOURS PRN
Status: DISCONTINUED | OUTPATIENT
Start: 2024-11-11 | End: 2024-11-15 | Stop reason: HOSPADM

## 2024-11-11 RX ORDER — DEXTROSE MONOHYDRATE 100 MG/ML
INJECTION, SOLUTION INTRAVENOUS CONTINUOUS PRN
Status: DISCONTINUED | OUTPATIENT
Start: 2024-11-11 | End: 2024-11-15 | Stop reason: HOSPADM

## 2024-11-11 RX ORDER — HYDRALAZINE HYDROCHLORIDE 25 MG/1
25 TABLET, FILM COATED ORAL 2 TIMES DAILY
Status: DISCONTINUED | OUTPATIENT
Start: 2024-11-11 | End: 2024-11-15 | Stop reason: HOSPADM

## 2024-11-11 RX ORDER — INSULIN GLARGINE 100 [IU]/ML
15 INJECTION, SOLUTION SUBCUTANEOUS NIGHTLY
Status: DISCONTINUED | OUTPATIENT
Start: 2024-11-11 | End: 2024-11-15 | Stop reason: HOSPADM

## 2024-11-11 RX ORDER — OXYCODONE AND ACETAMINOPHEN 5; 325 MG/1; MG/1
1 TABLET ORAL EVERY 6 HOURS PRN
Status: DISCONTINUED | OUTPATIENT
Start: 2024-11-11 | End: 2024-11-15 | Stop reason: HOSPADM

## 2024-11-11 RX ORDER — GLUCAGON 1 MG/ML
1 KIT INJECTION PRN
Status: DISCONTINUED | OUTPATIENT
Start: 2024-11-11 | End: 2024-11-15 | Stop reason: HOSPADM

## 2024-11-11 RX ORDER — CALCITRIOL 0.25 UG/1
0.5 CAPSULE, LIQUID FILLED ORAL 2 TIMES DAILY
Status: DISCONTINUED | OUTPATIENT
Start: 2024-11-11 | End: 2024-11-15 | Stop reason: HOSPADM

## 2024-11-11 RX ADMIN — IOPAMIDOL 80 ML: 755 INJECTION, SOLUTION INTRAVENOUS at 11:29

## 2024-11-11 RX ADMIN — INSULIN GLARGINE 15 UNITS: 100 INJECTION, SOLUTION SUBCUTANEOUS at 20:28

## 2024-11-11 RX ADMIN — OXYCODONE HYDROCHLORIDE AND ACETAMINOPHEN 1 TABLET: 5; 325 TABLET ORAL at 20:13

## 2024-11-11 RX ADMIN — CALCITRIOL CAPSULES 0.25 MCG 0.5 MCG: 0.25 CAPSULE ORAL at 20:12

## 2024-11-11 RX ADMIN — HYDRALAZINE HYDROCHLORIDE 25 MG: 25 TABLET ORAL at 20:12

## 2024-11-11 RX ADMIN — INSULIN LISPRO 2 UNITS: 100 INJECTION, SOLUTION INTRAVENOUS; SUBCUTANEOUS at 20:28

## 2024-11-11 RX ADMIN — OXYCODONE HYDROCHLORIDE 5 MG: 5 TABLET ORAL at 20:28

## 2024-11-11 RX ADMIN — OXYCODONE HYDROCHLORIDE AND ACETAMINOPHEN 1 TABLET: 7.5; 325 TABLET ORAL at 10:02

## 2024-11-11 ASSESSMENT — PAIN DESCRIPTION - DESCRIPTORS
DESCRIPTORS: ACHING

## 2024-11-11 ASSESSMENT — PAIN DESCRIPTION - LOCATION
LOCATION: FOOT;LEG
LOCATION: FOOT
LOCATION: FOOT
LOCATION: CHEST;LEG;FOOT

## 2024-11-11 ASSESSMENT — PAIN SCALES - GENERAL
PAINLEVEL_OUTOF10: 8
PAINLEVEL_OUTOF10: 8
PAINLEVEL_OUTOF10: 5
PAINLEVEL_OUTOF10: 8

## 2024-11-11 ASSESSMENT — PAIN DESCRIPTION - FREQUENCY: FREQUENCY: CONTINUOUS

## 2024-11-11 ASSESSMENT — PAIN DESCRIPTION - ORIENTATION
ORIENTATION: LEFT

## 2024-11-11 ASSESSMENT — PAIN - FUNCTIONAL ASSESSMENT
PAIN_FUNCTIONAL_ASSESSMENT: ACTIVITIES ARE NOT PREVENTED
PAIN_FUNCTIONAL_ASSESSMENT: 0-10

## 2024-11-11 ASSESSMENT — PAIN DESCRIPTION - ONSET: ONSET: ON-GOING

## 2024-11-11 ASSESSMENT — PAIN DESCRIPTION - PAIN TYPE: TYPE: CHRONIC PAIN

## 2024-11-11 NOTE — ED NOTES
amiodarone (CORDARONE) 200 MG tablet Take 1 tablet by mouth daily  Patient not taking: Reported on 11/11/2024 6/18/24   Dereje Pastor MD   clopidogrel (PLAVIX) 75 MG tablet Take 1 tablet by mouth daily  Patient not taking: Reported on 11/11/2024 6/18/24   Dereje Pastor MD   Wound Cleansers (VASHE WOUND THERAPY) external solution During wound care 3/20/24   Dean Mcclendon MD   albuterol sulfate HFA (VENTOLIN HFA) 108 (90 Base) MCG/ACT inhaler Inhale 2 puffs into the lungs 4 times daily as needed for Wheezing 3/20/24   Dean Mcclendon MD   hydrALAZINE (APRESOLINE) 25 MG tablet Take 1 tablet by mouth every 8 hours  Patient taking differently: Take 1 tablet by mouth in the morning and at bedtime 8/6/23   Broderick Schafer MD   amLODIPine (NORVASC) 5 MG tablet Take 1 tablet by mouth daily 8/6/23   Broderick Schafer MD   Lancets MISC 1 each by Does not apply route daily 9/21/18   Esther Vela MD     Medications added or changed (ex. new medication, dosage change, interval change, formulation change):  Tums OTC (added)    Medications removed from list (include reason, ex. noncompliance, medication cost, therapy complete etc.):   Atorvastatin not taking  Chlorthalidone discontinued 8/03/2022  Furosemide discontinued 08/03/2022  Novolog discontinued 08/03/2022  Linagliptin discontinued 08/03/2022  Metoprolol tartrate discontinued 09/14/2022  Amiodarone Profiled never dispensed from retail pharmacy., flagged for review.  Clopidogrel Profiled never dispensed from retail pharmacy., flagged for review.  Isosorbide MN Profiled never dispensed from retail pharmacy., flagged for review.    Comments:  Medication list reviewed with patient and insurance claims verified.  Insulin updated per information received. Patient reports he takes Lantus 15 units at HS and uses sliding scale for Humalog.  Per claims patient only has one active medication, percocet.  Reached out to retail pharmacy, for

## 2024-11-11 NOTE — ED NOTES
Dialysis calling and reports ready for pt and setting up transport. Meal tray here for pt. Made aware of going to dialysis

## 2024-11-11 NOTE — ED PROVIDER NOTES
Emergency Department Encounter    Patient: Yovanny Levine  MRN: [id number]  : 1975  Date of Evaluation: 2024  ED Provider:  Davey Egan DO    Triage Chief Complaint:   Chest Pain (Chest pain and shortness of breath x2 hours, due for dialysis this morning )    [place]:  Yovanny Levine is a 49 y.o. male that presents with 1 day of chest pain and shortness of breath.  He states he does get dialysis , he reports he gets some shortness of breath over the weekend usually but never this bad.  He has not had chest pain usually either.  Denies any lightheadedness or syncope.  No history of DVT or PE.  He does report he has a history of a diabetic ulcer in his left leg, currently being managed by wound care.  Has had some increased pain in his left leg.  He reports he does have oxygen at home but was told that he will need to wear it at nighttime.    MDM:    History from : Patient    On arrival patient was hypertensive, he was hypoxic to 85% on room air.  He was placed on 2 L nasal cannula.  He was sitting up in bed due to his dyspnea but was able to converse a fully.  He is not toxic-appearing.  Likely reason for his dyspnea is fluid overload but given his increased pain in his left leg and hypoxia will obtain CT scan of his chest to rule out pulmonary embolism.  EKG is overall nonischemic.    CT scan without any evidence for PE, does have significant bilateral pleural effusions.  This is likely was causing his increased oxygen requirement.  Will reach out to nephrology for dialysis and talk with hospitalist for admission    EKG (if obtained): (All EKG's are interpreted by myself in the absence of a cardiologist) sinus rhythm with rate of 87.  No ST elevation or ST depression.  , QRS 90, QTc 486      ED Course as of 24 1222   Mon 2024   1113 Reviewed outpatient wound care notes, patient has a history of left midfoot diabetic ulcer with infection.  Receives hyperbaric

## 2024-11-11 NOTE — PROGRESS NOTES
Patient Name: Yovanny Levine  Patient : 1975  MRN: [id number]     Acct: [id number]  Date of Admission: 2024  Room/Bed: VISH-02/VISH-2  Code Status:  Prior  Allergies:   Allergies   Allergen Reactions    Adhesive Tape Rash    Doxycycline Nausea And Vomiting    Reglan [Metoclopramide] Anxiety     Diagnosis:    Patient Active Problem List   Diagnosis    Hiatal hernia    Diabetes mellitus type 2, improved controlled    Diabetic neuropathy (HCC)    Panic attack    Anxiety associated with depression    Neck pain    DANIELA (obstructive sleep apnea)    Urinary frequency    Bilateral carpal tunnel syndrome- left worse than right    Hyperlipidemia with target LDL less than 100    Essential hypertension    Bronchitis    Osteomyelitis    Abscess    Periumbilical hernia    Sepsis (HCC)    Cellulitis of left foot    Constipation    Intractable nausea and vomiting    Uncontrolled type 2 diabetes mellitus    Nausea and vomiting    Diabetic foot infection ([facility])    Acute renal failure (ARF) ([facility])    Cellulitis    Cellulitis of left arm    Cellulitis, scrotum    Acute epididymitis    Irene gangrene    Recurrent major depressive disorder, in full remission ([facility])    Generalized anxiety disorder    Necrotizing fasciitis    Syncope    Right groin wound    Ulcer of right groin with fat layer exposed ([facility])    Diabetic ulcer of left midfoot associated with type 2 diabetes mellitus (HCC)    Nephrotic syndrome    Generalized edema    Stage 3b chronic kidney disease (HCC)    Diabetic nephropathy associated with type 2 diabetes mellitus ([facility])    Hypoalbuminemia    Chronic kidney disease, stage IV (severe) ([facility])    FH: CAD (coronary artery disease)    ASCVD (arteriosclerotic cardiovascular disease)    Other proteinuria    Chest pain    Surgical wound dehiscence    CARMEN (acute kidney injury) ([facility])    Anemia    Chest tightness    NSTEMI (non-ST elevated myocardial infarction) ([facility])    Diabetic foot ulcer with osteomyelitis ([facility])

## 2024-11-11 NOTE — H&P
V2.0  History and Physical      Name:  Yovanny Levine /Age/Sex: 1975  (49 y.o. male)   MRN & CSN:  [id number] & [id number] Encounter Date/Time: 2024 12:49 PM EST   Location:  ED20/ED-20 PCP: José Luis Izquierdo MD       Hospital Day: 1    Assessment and Plan:     Patient is a 49 y.o. male who presented with shortness of breath    Acute hypoxic respiratory failure  -Patient uses 2 L oxygen just during the night  -Was hypoxic on arrival to the ER was placed on 2 L O2 via nasal cannula, during my encounter I wean him down to room air  -Likely secondary to fluid overload in the setting of end-stage kidney disease  -CT chest reviewed    ESRD on hemodialysis  -Patient missed hemodialysis today, appointment was at 10:30 AM  -Nephro consulted plan for HD today can be discharged in next 24 hours    History of chronic left foot osteomyelitis  -Goes to wound care every Tuesday, next appointment tomorrow  Will consult wound care while patient being admitted    Coronary artery disease with history of PCI and stenting to LAD and LCx continue Plavix statin and Coreg     Hyperlipidemia on statin     Chronic pain disorder on home Percocet,      Essential hypertension on home amlodipine carvedilol and hydralazine along with Imdur    Checklist:  Advanced directive: full  Diet: diabetic  DVT ppx: Lovenox  Sugar: BG goal of 140-180 while inpatient    Disposition: place in observation.  Estimated discharge: 1 day(s).  Current living situation: home.  Expected disposition: home.    Spoke with ED provider who recommended admission for the patient and I agree with that plan.  Personally reviewed lab studies and imaging.  EKG interpreted personally and results as stated above.  Imaging that was interpreted personally and results as stated above.      Comment: Please note this report has been produced using speech recognition software and may contain errors related to that system including errors in grammar, punctuation, and  CONTRAST    Result Date: 11/11/2024  CTA CHEST CLINICAL INDICATION: Shortness of breath. Rule out pulmonary embolism. COMPARISON: CT abdomen pelvis July 24, 2022. TECHNIQUE: Helical scan mode CTA of the Thorax after administration of 100 mL of Isovue-370 .  3-D Reconstruction and MIP projections were performed of the pulmonary arteries.  Dose lowering technique and dose optimization was utilized. FINDINGS: The heart is normal in size. No central or segmental pulmonary embolism. Evaluation is limited by poor bolus opacification. The distal segmental and subsegmental branches are not well-visualized. No aortic dissection. Is nondiagnostic for aortic dissection. No aneurysm. There is no mediastinal or hilar lymphadenopathy. Moderate to large right and moderate left pleural effusions. Hazy diffuse groundglass opacities are present. There is right lower lobe atelectasis. No consolidation or focal infiltrate. Nodular contour to the liver is suggestive of cirrhosis. Review of bone windows demonstrates no osteoblastic or lytic lesions.     No pulmonary embolism. Findings suggestive of pulmonary edema with moderate to large right and moderate left pleural effusions. Cirrhotic appearing liver. ** If there are any questions about this report, I can be reached on [employer] or at [phone] ** Electronically signed by KULWINDER WHITFIELD        Electronically signed by Aleksandra Reynoso MD on 11/11/2024 at 12:49 PM

## 2024-11-11 NOTE — ED NOTES
Pt placing call light on. Reports taking percocet routinely 4 times a day at home and has not had since yesterday. Complaints of pain to L foot and leg d/t osteomyelitis.

## 2024-11-11 NOTE — ED NOTES
Pt sat noted to be 87% on room air. Reports hospitalist turning o2 off. Placed back on and ED doc notified and to page hospitalist.

## 2024-11-11 NOTE — PROGRESS NOTES
4 Eyes Skin Assessment     NAME:  Yovanny Levine  YOB: 1975  MEDICAL RECORD NUMBER:  [id number]    The patient is being assessed for  Admission    I agree that at least one RN has performed a thorough Head to Toe Skin Assessment on the patient. ALL assessment sites listed below have been assessed.      Areas assessed by both nurses:    Head, Face, Ears, Shoulders, Back, Chest, Arms, Elbows, Hands, Sacrum. Buttock, Coccyx, Ischium, Legs. Feet and Heels, and Under Medical Devices         Does the Patient have a Wound? Yes wound(s) were present on assessment. LDA wound assessment was Initiated and completed by RN       Pradeep Prevention initiated by RN: Yes  Wound Care Orders initiated by RN: Yes    Pressure Injury (Stage 3,4, Unstageable, DTI, NWPT, and Complex wounds) if present, place Wound referral order by RN under : No    New Ostomies, if present place, Ostomy referral order under : No     Nurse 1 eSignature: Electronically signed by Armani Kyle RN on 11/11/24 at 6:49 PM EST    **SHARE this note so that the co-signing nurse can place an eSignature**    Nurse 2 eSignature: Electronically signed by Coreen Pollock LPN on 11/11/24 at 7:05 PM EST

## 2024-11-11 NOTE — ED TRIAGE NOTES
Pt to the ED via walk in with chest pain and shortness of breath x2 hours. Pt states he would also like to be seen for pain management for his left foot pain that he has had since 12/2022 with a history of osteomyelitis. Patient had an oxygen saturation of 84% on arrival to the ED, was placed on 2L, and is now at 93%. Pt wears 3L at night at baseline.

## 2024-11-12 ENCOUNTER — HOSPITAL ENCOUNTER (OUTPATIENT)
Dept: PULMONOLOGY | Age: 49
Discharge: HOME | End: 2024-11-12
Attending: INTERNAL MEDICINE

## 2024-11-12 ENCOUNTER — APPOINTMENT (OUTPATIENT)
Dept: GENERAL RADIOLOGY | Age: 49
DRG: 640 | End: 2024-11-12
Payer: MEDICARE

## 2024-11-12 LAB
EKG ATRIAL RATE: 87 BPM
EKG DIAGNOSIS: NORMAL
EKG P AXIS: 97 DEGREES
EKG P-R INTERVAL: 172 MS
EKG Q-T INTERVAL: 404 MS
EKG QRS DURATION: 90 MS
EKG QTC CALCULATION (BAZETT): 486 MS
EKG R AXIS: 51 DEGREES
EKG T AXIS: 96 DEGREES
EKG VENTRICULAR RATE: 87 BPM
GLUCOSE BLD-MCNC: 135 MG/DL (ref 74–99)
GLUCOSE BLD-MCNC: 153 MG/DL (ref 74–99)
GLUCOSE BLD-MCNC: 179 MG/DL (ref 74–99)
GLUCOSE BLD-MCNC: 94 MG/DL (ref 74–99)

## 2024-11-12 PROCEDURE — 6370000000 HC RX 637 (ALT 250 FOR IP): Performed by: STUDENT IN AN ORGANIZED HEALTH CARE EDUCATION/TRAINING PROGRAM

## 2024-11-12 PROCEDURE — 93010 ELECTROCARDIOGRAM REPORT: CPT | Performed by: INTERNAL MEDICINE

## 2024-11-12 PROCEDURE — G0378 HOSPITAL OBSERVATION PER HR: HCPCS

## 2024-11-12 PROCEDURE — 2700000000 HC OXYGEN THERAPY PER DAY

## 2024-11-12 PROCEDURE — 71045 X-RAY EXAM CHEST 1 VIEW: CPT

## 2024-11-12 PROCEDURE — 94761 N-INVAS EAR/PLS OXIMETRY MLT: CPT

## 2024-11-12 PROCEDURE — 82962 GLUCOSE BLOOD TEST: CPT

## 2024-11-12 PROCEDURE — 90935 HEMODIALYSIS ONE EVALUATION: CPT

## 2024-11-12 RX ADMIN — HYDRALAZINE HYDROCHLORIDE 25 MG: 25 TABLET ORAL at 12:53

## 2024-11-12 RX ADMIN — OXYCODONE HYDROCHLORIDE AND ACETAMINOPHEN 1 TABLET: 5; 325 TABLET ORAL at 07:30

## 2024-11-12 RX ADMIN — OXYCODONE HYDROCHLORIDE 5 MG: 5 TABLET ORAL at 14:58

## 2024-11-12 RX ADMIN — CALCITRIOL CAPSULES 0.25 MCG 0.5 MCG: 0.25 CAPSULE ORAL at 12:53

## 2024-11-12 RX ADMIN — INSULIN GLARGINE 15 UNITS: 100 INJECTION, SOLUTION SUBCUTANEOUS at 22:14

## 2024-11-12 RX ADMIN — OXYCODONE HYDROCHLORIDE AND ACETAMINOPHEN 1 TABLET: 5; 325 TABLET ORAL at 14:57

## 2024-11-12 RX ADMIN — OXYCODONE HYDROCHLORIDE 5 MG: 5 TABLET ORAL at 22:13

## 2024-11-12 RX ADMIN — CALCITRIOL CAPSULES 0.25 MCG 0.5 MCG: 0.25 CAPSULE ORAL at 22:13

## 2024-11-12 RX ADMIN — OXYCODONE HYDROCHLORIDE AND ACETAMINOPHEN 1 TABLET: 5; 325 TABLET ORAL at 22:14

## 2024-11-12 RX ADMIN — HYDRALAZINE HYDROCHLORIDE 25 MG: 25 TABLET ORAL at 22:14

## 2024-11-12 RX ADMIN — OXYCODONE HYDROCHLORIDE 5 MG: 5 TABLET ORAL at 07:29

## 2024-11-12 ASSESSMENT — PAIN DESCRIPTION - DESCRIPTORS
DESCRIPTORS: SHARP;STABBING
DESCRIPTORS: ACHING;STABBING
DESCRIPTORS: STABBING

## 2024-11-12 ASSESSMENT — PAIN SCALES - GENERAL
PAINLEVEL_OUTOF10: 8

## 2024-11-12 ASSESSMENT — PAIN DESCRIPTION - ONSET: ONSET: ON-GOING

## 2024-11-12 ASSESSMENT — PAIN DESCRIPTION - LOCATION
LOCATION: FOOT
LOCATION: LEG;FOOT
LOCATION: FOOT

## 2024-11-12 ASSESSMENT — PAIN DESCRIPTION - PAIN TYPE: TYPE: SURGICAL PAIN;ACUTE PAIN

## 2024-11-12 ASSESSMENT — PAIN DESCRIPTION - ORIENTATION
ORIENTATION: LEFT

## 2024-11-12 ASSESSMENT — PAIN DESCRIPTION - FREQUENCY: FREQUENCY: CONTINUOUS

## 2024-11-12 NOTE — PROGRESS NOTES
I did NOT see the patient. The patient was discussed with the RUSTY. I was available for questions and consultation as needed.       Getting dialysis today. Repeat CXR afterwards.

## 2024-11-12 NOTE — PROGRESS NOTES
(BFR):  350   Dialysate Flow Rate (DFR):   700        Access to be Utilized   Access: AVF  Location: Upper Extremity  Side: Left   Needle gauge:  16  + Bruit/Thrill: Yes    First Use X-ray Verified: Not Applicable  OK to use line order: Not Applicable    Site Assessment:  Signs and Symptoms of Infection/Inflammation: None  If yes: Not Applicable  Dressing: Dry and Intact  Site Prep: Medical Aseptic Technique  Dressing Changed this Treatment: Yes  Comment:    Flows: Good, Patent  If access problem, who was notified:     Pre and Post-Assessment  Patient Vitals for the past 8 hrs:   Level of Consciousness Oriented X Heart Rhythm Respiratory Quality/Effort O2 Device Bilateral Breath Sounds Skin Color Skin Condition/Temp Abdomen Inspection Bowel Sounds (All Quadrants) Edema Edema Generalized RLE Edema LLE Edema Pain Level   11/12/24 0729 -- -- -- -- -- -- -- -- -- -- -- -- -- -- 8   11/12/24 0816 0 4 Regular Unlabored Nasal cannula Diminished Pale Dry;Warm Rounded Active Generalized;Right lower extremity;Left lower extremity Non-pitting +1 +1 --   11/12/24 1215 0 4 Regular Unlabored Nasal cannula Diminished Pale Dry;Warm Rounded Active Generalized;Right lower extremity;Left lower extremity Non-pitting +1 +1 --     Labs  Recent Labs     11/11/24  0807   WBC 6.8   HGB 10.0*   HCT 31.3*   *                                                                  Recent Labs     11/11/24  0807      K 4.5   CL 94*   CO2 23   BUN 79*   CREATININE 8.6*   GLUCOSE 177*     IV Drips and Rate/Dose   dextrose        Safety - Before each treatment:   Dialysis Machine No.: [id number]   Machine Number: [id number]  Dialyzer Lot No.: [id number]  RO Machine Log Sheet Completed: Yes  Machine Alarm Self Test: Completed;Passed (11/12/24 0700)     Air Foam Detector: Tested, Proper Function, pH Reading  Extracorporeal Circuit Tested for Integrity: Yes  Machine Conductivity: 13.6  Manual Conductivity: 13.6     Bicarbonate Concentrate Lot No.:  170 80 700 lines secure Yes   11/12/24 1115 350 ml/min 1000 ml/hr 2824 ml -140 mmHg 170 80 700 resting Yes   11/12/24 1130 350 ml/min 1000 ml/hr 3066 ml -140 mmHg 170 80 700 bicarb changed Yes   11/12/24 1145 350 ml/min 1000 ml/hr 3284 ml -140 mmHg 180 70 700 pt alert, no distress noted Yes   11/12/24 1200 350 ml/min 940 ml/hr 3488 ml -140 mmHg 170 70 700 no needs Yes   11/12/24 1207 -- -- 3500 ml -- -- -- -- tx complete --     Vital Signs  Patient Vitals for the past 8 hrs:   BP Pulse Resp   11/12/24 0729 -- -- 16   11/12/24 0816 (!) 168/96 80 --   11/12/24 0825 (!) 171/96 77 --   11/12/24 0830 (!) 171/96 77 --   11/12/24 0845 (!) 173/104 80 --   11/12/24 0900 (!) 162/98 79 --   11/12/24 0915 (!) 177/96 81 --   11/12/24 0929 (!) 158/99 75 --   11/12/24 0945 (!) 143/91 72 --   11/12/24 1000 (!) 140/95 70 --   11/12/24 1015 137/85 69 --   11/12/24 1030 (!) 142/91 68 --   11/12/24 1045 (!) 140/86 70 --   11/12/24 1100 (!) 156/85 72 --   11/12/24 1115 (!) 140/84 71 --   11/12/24 1130 (!) 148/92 72 --   11/12/24 1145 (!) 145/91 71 --   11/12/24 1200 (!) 153/98 70 --   11/12/24 1207 (!) 162/93 70 --     Post-Dialysis  Arterial Catheter Locking Solution: Not Applicable  Venous Catheter Locking Solution: Not Applicable  Post-Treatment Procedures: Blood returned  Machine Disinfection Process: Acid/Vinegar Clean, Heat Disinfect, Exterior Machine Disinfection  Rinseback Volume (ml): 300 ml  Blood Volume Processed (Liters): 70.6 L  Dialyzer Clearance: Lightly streaked  Duration of Treatment (minutes): 210 minutes     Hemodialysis Intake (ml): 500 ml  Hemodialysis Output (ml): 3500 ml     Tolerated Treatment: Good  Patient Response to Treatment: well  Physician Notified: No       Provider Notification        Handoff complete and report given to Primary RN at 1221 hours.  Primary RN (First Initial, Last Name, Title):  TARUN Ledezma RN     Education  Person Educated: Patient   Knowledge Base: Substantial  Barriers to Learning?:

## 2024-11-12 NOTE — PROGRESS NOTES
Nephrology Service Consultation      [address], [address]  [place] [address]  Phone: [phone]  Office Hours: 8:30AM - 4:30PM  Monday - Friday        MEDICAL DECISION MAKING and Recommendations   -Chest pain  -Hypervolemia with pulm edema and bilateral pleural effusions on CTA chest  -Anemia of ESRD  -CKD-MBD  -ESRD on HD MWF  -Left foot diabetic ulcer hx and wound: getting hyperbaric treatment at the wound care clinic    Plan:  -HD planned again today with 3L fluid removal  -Continue calcitriol  -Continue antihypertensives  -Limit oral fluids to 1500ml per day    Thank you    Patient Active Problem List    Diagnosis Date Noted    Volume overload 02/22/2023    Acute on chronic respiratory failure with hypoxia 02/10/2023    CAD (coronary artery disease) 02/10/2023    Problem with vascular access 11/26/2022    ESRD (end stage renal disease) ([facility]) 09/12/2022    Diabetic foot ulcer with osteomyelitis ([facility]) 09/01/2022    NSTEMI (non-ST elevated myocardial infarction) ([facility]) 08/24/2022    Chest tightness 08/22/2022    CARMEN (acute kidney injury) ([facility]) 07/25/2022    Anemia 07/25/2022    Recurrent major depressive disorder, in full remission ([facility]) 05/18/2021    Generalized anxiety disorder 05/18/2021    Acute hypoxic respiratory failure 11/11/2024    Chronic refractory osteomyelitis of foot, left 09/06/2024    Pain in left lower leg 08/27/2024    Acute pain of left knee 08/20/2024    Diabetic ulcer of midfoot Dave 3 associated with type 2 diabetes mellitus, with muscle involvement without evidence of necrosis ([facility]) 05/31/2024    Diabetic ulcer of toe of right foot associated with type 2 diabetes mellitus, with necrosis of muscle ([facility]) 04/30/2024    ESRD (end stage renal disease) on dialysis ([facility]) 04/25/2024    Hyperkalemia 04/08/2024    Absence of toe of left foot ([facility]) 02/20/2024    Diabetic ulcer of left midfoot associated with diabetes mellitus due to underlying condition, with fat layer exposed

## 2024-11-12 NOTE — CONSULTS
[facility]omy Contin[facility] Nurse  Consult Note       Yovanny Levine  AGE: 49 y.o.   GENDER: male  : 1975  TODAY'S DATE:  2024    Subjective:     Reason for  Evaluation and Assessment: wound care eval.      Yovanny Levine is a 49 y.o. male referred by:   [x] Physician  [] Nursing  [] Other:     Wound Identification:  Wound Type: diabetic and non-healing surgical, traumatic   Contributing Factors: edema, diabetes, and chronic pressure        PAST MEDICAL HISTORY        Diagnosis Date    Abscess 2010    scrotal    Acute renal failure (ARF) ([facility]) 2019    Anxiety associated with depression     Anxiety associated with depression     Back pain 2012    CAD (coronary artery disease) 02/10/2023    Chest pain 2013    Diabetic nephropathy ([facility])     Diabetic neuropathy ([facility]) 2011    Diabetic ulcer of left midfoot associated with type 2 diabetes mellitus, with fat layer exposed ([facility]) 2017    Diverticulosis     C scope + Dr. Medina    DM (diabetes mellitus), type 2 ([facility])     DR. 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 Medina    Nausea and vomiting 2019    Necrotizing fasciitis     Nose fracture 1988    Panic attacks     Pericarditis     Hospitalized with s/p heart cath normal    Peripheral autonomic neuropathy due to diabetes mellitus ([facility])     Axonal EMG- NCS, 2011    Sebaceous cyst 2011    URI (upper respiratory infection) 2012    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 type 2 diabetes mellitus, with muscle involvement without evidence of necrosis ([facility]) 2021    WD-Wound, surgical, infected, initial encounter 2017    Wrist fracture 1986       PAST SURGICAL HISTORY    Past Surgical History:   Procedure  active    Patient/Caregiver Teaching:  Level of patient/caregiver understanding able to: pt voiced understanding.         Electronically signed by Rosie Salvador RN, on 11/12/2024 at 1:34 PM

## 2024-11-12 NOTE — PROGRESS NOTES
PRN  dextrose, , Continuous PRN  oxyCODONE-acetaminophen, 1 tablet, Q6H PRN   And  oxyCODONE, 5 mg, Q6H PRN        Labs and Imaging   CT CHEST PULMONARY EMBOLISM W CONTRAST    Result Date: 11/11/2024  CTA CHEST CLINICAL INDICATION: Shortness of breath. Rule out pulmonary embolism. COMPARISON: CT abdomen pelvis July 24, 2022. TECHNIQUE: Helical scan mode CTA of the Thorax after administration of 100 mL of Isovue-370 .  3-D Reconstruction and MIP projections were performed of the pulmonary arteries.  Dose lowering technique and dose optimization was utilized. FINDINGS: The heart is normal in size. No central or segmental pulmonary embolism. Evaluation is limited by poor bolus opacification. The distal segmental and subsegmental branches are not well-visualized. No aortic dissection. Is nondiagnostic for aortic dissection. No aneurysm. There is no mediastinal or hilar lymphadenopathy. Moderate to large right and moderate left pleural effusions. Hazy diffuse groundglass opacities are present. There is right lower lobe atelectasis. No consolidation or focal infiltrate. Nodular contour to the liver is suggestive of cirrhosis. Review of bone windows demonstrates no osteoblastic or lytic lesions.     No pulmonary embolism. Findings suggestive of pulmonary edema with moderate to large right and moderate left pleural effusions. Cirrhotic appearing liver. ** If there are any questions about this report, I can be reached on [email or web address] or at [phone] ** Electronically signed by KULWINDER WHITFIELD      CBC:   Recent Labs     11/11/24  0807   WBC 6.8   HGB 10.0*   *     BMP:    Recent Labs     11/11/24  0807      K 4.5   CL 94*   CO2 23   BUN 79*   CREATININE 8.6*   GLUCOSE 177*     Hepatic:   Recent Labs     11/11/24  0807   AST 15   ALT <5*   BILITOT 0.5   ALKPHOS 79     Lipids:   Lab Results   Component Value Date/Time    CHOL 166 09/22/2022 02:02 PM    CHOL 168 10/15/2013 10:30 AM    HDL 30 09/22/2022 02:02 PM

## 2024-11-13 LAB
ANION GAP SERPL CALCULATED.3IONS-SCNC: 15 MMOL/L (ref 9–17)
BUN SERPL-MCNC: 45 MG/DL (ref 7–20)
CALCIUM SERPL-MCNC: 9.5 MG/DL (ref 8.3–10.6)
CHLORIDE SERPL-SCNC: 98 MMOL/L (ref 99–110)
CO2 SERPL-SCNC: 26 MMOL/L (ref 21–32)
CREAT SERPL-MCNC: 5.2 MG/DL (ref 0.9–1.3)
GFR, ESTIMATED: 12 ML/MIN/1.73M2
GLUCOSE BLD-MCNC: 140 MG/DL (ref 74–99)
GLUCOSE BLD-MCNC: 145 MG/DL (ref 74–99)
GLUCOSE BLD-MCNC: 154 MG/DL (ref 74–99)
GLUCOSE SERPL-MCNC: 146 MG/DL (ref 74–99)
POTASSIUM SERPL-SCNC: 4 MMOL/L (ref 3.5–5.1)
SODIUM SERPL-SCNC: 139 MMOL/L (ref 136–145)

## 2024-11-13 PROCEDURE — 80048 BASIC METABOLIC PNL TOTAL CA: CPT

## 2024-11-13 PROCEDURE — 6370000000 HC RX 637 (ALT 250 FOR IP): Performed by: STUDENT IN AN ORGANIZED HEALTH CARE EDUCATION/TRAINING PROGRAM

## 2024-11-13 PROCEDURE — 90935 HEMODIALYSIS ONE EVALUATION: CPT

## 2024-11-13 PROCEDURE — G0378 HOSPITAL OBSERVATION PER HR: HCPCS

## 2024-11-13 PROCEDURE — 6360000002 HC RX W HCPCS: Performed by: NURSE PRACTITIONER

## 2024-11-13 PROCEDURE — 82962 GLUCOSE BLOOD TEST: CPT

## 2024-11-13 PROCEDURE — 96372 THER/PROPH/DIAG INJ SC/IM: CPT

## 2024-11-13 PROCEDURE — 94761 N-INVAS EAR/PLS OXIMETRY MLT: CPT

## 2024-11-13 RX ORDER — HEPARIN SODIUM 5000 [USP'U]/ML
5000 INJECTION, SOLUTION INTRAVENOUS; SUBCUTANEOUS 2 TIMES DAILY
Status: DISCONTINUED | OUTPATIENT
Start: 2024-11-13 | End: 2024-11-15 | Stop reason: HOSPADM

## 2024-11-13 RX ADMIN — CALCITRIOL CAPSULES 0.25 MCG 0.5 MCG: 0.25 CAPSULE ORAL at 21:11

## 2024-11-13 RX ADMIN — OXYCODONE HYDROCHLORIDE 5 MG: 5 TABLET ORAL at 06:47

## 2024-11-13 RX ADMIN — INSULIN GLARGINE 15 UNITS: 100 INJECTION, SOLUTION SUBCUTANEOUS at 21:11

## 2024-11-13 RX ADMIN — OXYCODONE HYDROCHLORIDE 5 MG: 5 TABLET ORAL at 13:14

## 2024-11-13 RX ADMIN — OXYCODONE HYDROCHLORIDE 5 MG: 5 TABLET ORAL at 19:28

## 2024-11-13 RX ADMIN — OXYCODONE HYDROCHLORIDE AND ACETAMINOPHEN 1 TABLET: 5; 325 TABLET ORAL at 06:47

## 2024-11-13 RX ADMIN — HYDRALAZINE HYDROCHLORIDE 25 MG: 25 TABLET ORAL at 13:14

## 2024-11-13 RX ADMIN — HYDRALAZINE HYDROCHLORIDE 25 MG: 25 TABLET ORAL at 21:12

## 2024-11-13 RX ADMIN — OXYCODONE HYDROCHLORIDE AND ACETAMINOPHEN 1 TABLET: 5; 325 TABLET ORAL at 13:14

## 2024-11-13 RX ADMIN — OXYCODONE HYDROCHLORIDE AND ACETAMINOPHEN 1 TABLET: 5; 325 TABLET ORAL at 19:28

## 2024-11-13 RX ADMIN — HEPARIN SODIUM 5000 UNITS: 5000 INJECTION INTRAVENOUS; SUBCUTANEOUS at 21:11

## 2024-11-13 ASSESSMENT — PAIN DESCRIPTION - PAIN TYPE
TYPE: SURGICAL PAIN
TYPE: SURGICAL PAIN;ACUTE PAIN

## 2024-11-13 ASSESSMENT — PAIN DESCRIPTION - DESCRIPTORS
DESCRIPTORS: ACHING;CRAMPING
DESCRIPTORS: STABBING;SHARP
DESCRIPTORS: STABBING

## 2024-11-13 ASSESSMENT — PAIN DESCRIPTION - ONSET: ONSET: ON-GOING

## 2024-11-13 ASSESSMENT — PAIN DESCRIPTION - ORIENTATION
ORIENTATION: LEFT
ORIENTATION: LOWER
ORIENTATION: LEFT

## 2024-11-13 ASSESSMENT — PAIN DESCRIPTION - LOCATION
LOCATION: LEG;FOOT
LOCATION: FOOT;LEG
LOCATION: FOOT;LEG

## 2024-11-13 ASSESSMENT — PAIN SCALES - GENERAL
PAINLEVEL_OUTOF10: 7
PAINLEVEL_OUTOF10: 6

## 2024-11-13 ASSESSMENT — PAIN DESCRIPTION - FREQUENCY: FREQUENCY: CONTINUOUS

## 2024-11-13 NOTE — PLAN OF CARE
Problem: Safety - Adult  Goal: Free from fall injury  Outcome: Progressing     Problem: Chronic Conditions and Co-morbidities  Goal: Patient's chronic conditions and co-morbidity symptoms are monitored and maintained or improved  Outcome: Progressing     Problem: Discharge Planning  Goal: Discharge to home or other facility with appropriate resources  Outcome: Progressing     Problem: Pain  Goal: Verbalizes/displays adequate comfort level or baseline comfort level  Outcome: Progressing       from Lab called with critical result of Hgb at 1310. Critical lab result read back to .   Dr. Norris notified of critical lab result at 1331.  Critical lab result read back by Dr. Norris.

## 2024-11-13 NOTE — PROGRESS NOTES
Nephrology Progress Note        [address] [facility], [address]  [place] [address]  Phone: [phone]  Office Hours: 8:30AM - 4:30PM  Monday - Friday 11/13/2024 6:38 AM  Subjective:   Admit Date: 11/11/2024  PCP: José Luis Izquierdo MD  Interval History:   On NC  Reports some improvement in breathing, compared to admission    Diet: ADULT DIET; Regular; 4 carb choices (60 gm/meal); No Added Salt (3-4 gm); 1500 ml      Data:   Scheduled Meds:   calcitRIOL  0.5 mcg Oral BID    hydrALAZINE  25 mg Oral BID    insulin glargine  15 Units SubCUTAneous Nightly    insulin lispro  0-8 Units SubCUTAneous 4x Daily AC & HS     Continuous Infusions:   dextrose       PRN Meds:albuterol sulfate HFA, glucose, dextrose bolus **OR** dextrose bolus, glucagon (rDNA), dextrose, oxyCODONE-acetaminophen **AND** oxyCODONE  I/O last 3 completed shifts:  In: 1000   Out: 7000   No intake/output data recorded.    Intake/Output Summary (Last 24 hours) at 11/13/2024 0638  Last data filed at 11/12/2024 1207  Gross per 24 hour   Intake 500 ml   Output 3500 ml   Net -3000 ml       CBC:   Recent Labs     11/11/24  0807   WBC 6.8   HGB 10.0*   *       BMP:    Recent Labs     11/11/24  0807      K 4.5   CL 94*   CO2 23   BUN 79*   CREATININE 8.6*   GLUCOSE 177*   CALCIUM 8.5     Hepatic:   Recent Labs     11/11/24  0807   AST 15   ALT <5*   BILITOT 0.5   ALKPHOS 79       Objective:   Vitals: BP (!) 154/98   Pulse 80   Temp 98.2 °F (36.8 °C) (Oral)   Resp 15   Ht 1.753 m (5' 9\")   Wt 121.2 kg (267 lb 3.2 oz)   SpO2 93%   BMI 39.46 kg/m²   General appearance: alert and cooperative with exam, in no acute distress  HEENT: normocephalic, atraumatic,   Neck: supple, trachea midline  Lungs: breathing comfortably on nc  Abdomen: non distended,   Extremities: no ble edema  Neurologic: alert, oriented, follows commands, interactive    MEDICAL DECISION MAKING     -Chest pain  -Hypervolemia with pulm edema and bilateral pleural

## 2024-11-13 NOTE — PROGRESS NOTES
We received an IR consult and we do not have an IR doctor today but we do have an IR doctor on call [facility].  Updated the pt nurse Coreen MOSES and Corazon Montoya NP.  Dr Hernández will review this tomorrow.

## 2024-11-13 NOTE — PROGRESS NOTES
-110 mmHg 160 50 700 tolerating tx well Yes   11/13/24 1015 300 ml/min 1000 ml/hr 2307 ml -110 mmHg 170 50 700 no needs Yes   11/13/24 1030 300 ml/min 1000 ml/hr 2635 ml -100 mmHg 170 40 700 watching tv. Yes   11/13/24 1045 300 ml/min 1000 ml/hr 2869 ml -110 mmHg 170 20 700 replaced acid and bicarb jugs Yes   11/13/24 1100 300 ml/min 1000 ml/hr 3083 ml -120 mmHg 180 10 700 lines secure Yes   11/13/24 1115 300 ml/min 1000 ml/hr 3372 ml -110 mmHg 170 0 700 no needs Yes   11/13/24 1130 300 ml/min 1000 ml/hr 3591 ml -110 mmHg 210 10 700 resting Yes   11/13/24 1145 300 ml/min 1020 ml/hr 3805 ml -100 mmHg 220 10 700 no concerns Yes   11/13/24 1205 -- -- 4000 ml -- -- -- -- tx complete --     Vital Signs  Patient Vitals for the past 8 hrs:   BP Temp Pulse   11/13/24 0742 (!) 158/95 98 °F (36.7 °C) 77   11/13/24 0750 (!) 164/104 -- 75   11/13/24 0800 (!) 157/96 -- 76   11/13/24 0815 (!) 168/101 -- 75   11/13/24 0830 (!) 174/104 -- 79   11/13/24 0845 (!) 143/102 -- 75   11/13/24 0900 (!) 172/101 -- 77   11/13/24 0915 (!) 157/95 -- 74   11/13/24 0929 (!) 161/92 -- 73   11/13/24 0945 (!) 156/94 -- 72   11/13/24 1000 (!) 155/89 -- 72   11/13/24 1015 (!) 159/93 -- 73   11/13/24 1030 (!) 144/97 -- 74   11/13/24 1045 136/67 -- 76   11/13/24 1100 (!) 151/93 -- 73   11/13/24 1115 (!) 157/90 -- 70   11/13/24 1130 (!) 161/97 -- 73   11/13/24 1145 (!) 160/91 -- 73   11/13/24 1205 (!) 158/95 -- 73     Post-Dialysis  Arterial Catheter Locking Solution: Not Applicable  Venous Catheter Locking Solution: Not Applicable  Post-Treatment Procedures: Blood returned, Access bleeding time < 10 minutes  Machine Disinfection Process: Acid/Vinegar Clean, Heat Disinfect, Exterior Machine Disinfection  Rinseback Volume (ml): 300 ml  Blood Volume Processed (Liters): 68.5 L  Dialyzer Clearance: Lightly streaked  Duration of Treatment (minutes): 240 minutes     Hemodialysis Intake (ml): 500 ml  Hemodialysis Output (ml): 4000 ml     Tolerated Treatment:  Good  Patient Response to Treatment: well  Physician Notified: No       Provider Notification        Handoff complete and report given to Primary RN at 1230 hours.  Primary RN (First Initial, Last Name, Title):  TARUN Pollock LPN     Education  Person Educated: Patient   Knowledge Base: Substantial  Barriers to Learning?: None  Preferred method of Learning: Oral  Topic(s): Access Care, Signs and Symptoms of Infection, Fluid Management, Albumin, Procedural, Medications, Treatment Options, Potassium, Diet, and Transplant   Teaching Tools: Explanation   Response to Education: Verbalized Understanding and Requires Follow-up     Electronically signed by Lola Marx RN on 11/13/2024 at 12:40 PM

## 2024-11-13 NOTE — PROGRESS NOTES
I did NOT see the patient. The patient was discussed with the RUSTY. I was available for questions and consultation as needed.       Repeat CXR. Plan for IR for thoracentesis tomorrow.

## 2024-11-13 NOTE — PROGRESS NOTES
V2.0    [facility] Progress Note      Name:  Yovanny Levine /Age/Sex: 1975  (49 y.o. male)   MRN & CSN:  [id number] & [id number] Encounter Date/Time: 2024 12:27 PM EST   Location:  [address] [place] [address]-A PCP: José Luis Izquierdo MD     Attending:Bert Tinoco MD       Hospital Day: 3    Assessment and Recommendations   Yovanny Levine is a 49 y.o. male with pmh of  type 2 diabetes, coronary artery disease, end-stage renal disease, hypertension, chronic osteomyelitis who presents with Acute hypoxic respiratory failure      Plan:     Acute hypoxic respiratory failure  -Patient uses 2 L oxygen just during the night  -Was hypoxic on arrival to the ER was placed on 2 L O2 via nasal cannula, during my encounter I wean him down to room air  -Likely secondary to fluid overload in the setting of end-stage kidney disease  -CT chest consistent with pulmonary edema with moderate to large pleural effusion.   -Dialysis today  -Repeat CXR after dialysis with no changes  -IR consult, will f/u tomorrow     ESRD on hemodialysis  -Patient missed hemodialysis today, appointment was at 10:30 AM  -Nephro consulted plan for HD today   -Dialysis daily. Plan to remove 3-3.5 liters  -Continue Calcitrol  -Daily fluid restriction: 1500 ml per day  -Daily weight      History of chronic left foot osteomyelitis  -Goes to wound care every Tuesday, next appointment tomorrow  Will consult wound care while patient being admitted     Coronary artery disease with history of PCI and stenting to LAD and LCx continue Plavix statin and Coreg      Hyperlipidemia on statin     Chronic pain disorder on home Percocet,      Essential hypertension on home amlodipine carvedilol and hydralazine along with Imdur      Diet ADULT DIET; Regular; 4 carb choices (60 gm/meal); No Added Salt (3-4 gm); 1500 ml   DVT Prophylaxis [] Lovenox, [x]  Heparin, [] SCDs, [] Ambulation,  [] Eliquis, [] Xarelto  [] Coumadin   Code Status Prior   Disposition From: Home  Expected  NEGATIVE 08/28/2024 03:09 AM    BLOODU TRACE 08/28/2024 03:09 AM    GLUCOSEU 250 08/28/2024 03:09 AM    KETUA NEGATIVE 08/28/2024 03:09 AM    AMORPHOUS RARE 07/24/2022 11:40 PM     Urine Cultures: No results found for: \"LABURIN\"  Blood Cultures: No results found for: \"BC\"  No results found for: \"BLOODCULT2\"  Organism:   Lab Results   Component Value Date/Time    ORG HOMAR 01/07/2019 12:08 AM         Electronically signed by APPLE Wiseman CNP on 11/13/2024 at 12:27 PM

## 2024-11-14 ENCOUNTER — APPOINTMENT (OUTPATIENT)
Dept: INTERVENTIONAL RADIOLOGY/VASCULAR | Age: 49
DRG: 640 | End: 2024-11-14
Payer: MEDICARE

## 2024-11-14 ENCOUNTER — APPOINTMENT (OUTPATIENT)
Dept: GENERAL RADIOLOGY | Age: 49
DRG: 640 | End: 2024-11-14
Payer: MEDICARE

## 2024-11-14 LAB
APPEARANCE FLD: CLEAR
BODY FLD TYPE: NORMAL
CLOT CHECK: NORMAL
COLOR FLD: YELLOW
GLUCOSE BLD-MCNC: 151 MG/DL (ref 74–99)
GLUCOSE BLD-MCNC: 179 MG/DL (ref 74–99)
GLUCOSE BLD-MCNC: 84 MG/DL (ref 74–99)
GLUCOSE BLD-MCNC: 94 MG/DL (ref 74–99)
INR PPP: 1.2
MONOCYTES NFR FLD: 85 %
NEUTROPHILS NFR FLD: 15 %
PARTIAL THROMBOPLASTIN TIME: 29.5 SEC (ref 25.1–37.1)
PROTHROMBIN TIME: 15.6 SEC (ref 11.7–14.5)
RBC # FLD: <2000 CELLS/UL
WBC # FLD: 189 CELLS/UL

## 2024-11-14 PROCEDURE — 82962 GLUCOSE BLOOD TEST: CPT

## 2024-11-14 PROCEDURE — 82945 GLUCOSE OTHER FLUID: CPT

## 2024-11-14 PROCEDURE — 82042 OTHER SOURCE ALBUMIN QUAN EA: CPT

## 2024-11-14 PROCEDURE — 82150 ASSAY OF AMYLASE: CPT

## 2024-11-14 PROCEDURE — G0378 HOSPITAL OBSERVATION PER HR: HCPCS

## 2024-11-14 PROCEDURE — 87075 CULTR BACTERIA EXCEPT BLOOD: CPT

## 2024-11-14 PROCEDURE — 85610 PROTHROMBIN TIME: CPT

## 2024-11-14 PROCEDURE — 32555 ASPIRATE PLEURA W/ IMAGING: CPT | Performed by: RADIOLOGY

## 2024-11-14 PROCEDURE — 85730 THROMBOPLASTIN TIME PARTIAL: CPT

## 2024-11-14 PROCEDURE — 96374 THER/PROPH/DIAG INJ IV PUSH: CPT

## 2024-11-14 PROCEDURE — 93005 ELECTROCARDIOGRAM TRACING: CPT | Performed by: NURSE PRACTITIONER

## 2024-11-14 PROCEDURE — 96375 TX/PRO/DX INJ NEW DRUG ADDON: CPT

## 2024-11-14 PROCEDURE — 83615 LACTATE (LD) (LDH) ENZYME: CPT

## 2024-11-14 PROCEDURE — 90935 HEMODIALYSIS ONE EVALUATION: CPT

## 2024-11-14 PROCEDURE — 6370000000 HC RX 637 (ALT 250 FOR IP): Performed by: STUDENT IN AN ORGANIZED HEALTH CARE EDUCATION/TRAINING PROGRAM

## 2024-11-14 PROCEDURE — 2700000000 HC OXYGEN THERAPY PER DAY

## 2024-11-14 PROCEDURE — 2709999900 IR GUIDED THORACENTESIS PLEURAL

## 2024-11-14 PROCEDURE — 88341 IMHCHEM/IMCYTCHM EA ADD ANTB: CPT

## 2024-11-14 PROCEDURE — 6360000002 HC RX W HCPCS: Performed by: NURSE PRACTITIONER

## 2024-11-14 PROCEDURE — 88305 TISSUE EXAM BY PATHOLOGIST: CPT

## 2024-11-14 PROCEDURE — 87070 CULTURE OTHR SPECIMN AEROBIC: CPT

## 2024-11-14 PROCEDURE — 71045 X-RAY EXAM CHEST 1 VIEW: CPT

## 2024-11-14 PROCEDURE — 96372 THER/PROPH/DIAG INJ SC/IM: CPT

## 2024-11-14 PROCEDURE — 94761 N-INVAS EAR/PLS OXIMETRY MLT: CPT

## 2024-11-14 PROCEDURE — 84157 ASSAY OF PROTEIN OTHER: CPT

## 2024-11-14 PROCEDURE — 36415 COLL VENOUS BLD VENIPUNCTURE: CPT

## 2024-11-14 PROCEDURE — 88108 CYTOPATH CONCENTRATE TECH: CPT

## 2024-11-14 PROCEDURE — 87205 SMEAR GRAM STAIN: CPT

## 2024-11-14 PROCEDURE — 89051 BODY FLUID CELL COUNT: CPT

## 2024-11-14 PROCEDURE — 88342 IMHCHEM/IMCYTCHM 1ST ANTB: CPT

## 2024-11-14 RX ORDER — MORPHINE SULFATE 2 MG/ML
2 INJECTION, SOLUTION INTRAMUSCULAR; INTRAVENOUS ONCE
Status: COMPLETED | OUTPATIENT
Start: 2024-11-14 | End: 2024-11-14

## 2024-11-14 RX ORDER — LORAZEPAM 2 MG/ML
1 INJECTION INTRAMUSCULAR ONCE
Status: COMPLETED | OUTPATIENT
Start: 2024-11-14 | End: 2024-11-14

## 2024-11-14 RX ADMIN — HYDRALAZINE HYDROCHLORIDE 25 MG: 25 TABLET ORAL at 22:29

## 2024-11-14 RX ADMIN — OXYCODONE HYDROCHLORIDE AND ACETAMINOPHEN 1 TABLET: 5; 325 TABLET ORAL at 19:37

## 2024-11-14 RX ADMIN — LORAZEPAM 1 MG: 2 INJECTION INTRAMUSCULAR; INTRAVENOUS at 16:11

## 2024-11-14 RX ADMIN — INSULIN GLARGINE 15 UNITS: 100 INJECTION, SOLUTION SUBCUTANEOUS at 22:30

## 2024-11-14 RX ADMIN — OXYCODONE HYDROCHLORIDE AND ACETAMINOPHEN 1 TABLET: 5; 325 TABLET ORAL at 14:19

## 2024-11-14 RX ADMIN — OXYCODONE HYDROCHLORIDE 5 MG: 5 TABLET ORAL at 19:37

## 2024-11-14 RX ADMIN — OXYCODONE HYDROCHLORIDE 5 MG: 5 TABLET ORAL at 06:59

## 2024-11-14 RX ADMIN — OXYCODONE HYDROCHLORIDE 5 MG: 5 TABLET ORAL at 14:14

## 2024-11-14 RX ADMIN — OXYCODONE HYDROCHLORIDE AND ACETAMINOPHEN 1 TABLET: 5; 325 TABLET ORAL at 07:01

## 2024-11-14 RX ADMIN — HEPARIN SODIUM 5000 UNITS: 5000 INJECTION INTRAVENOUS; SUBCUTANEOUS at 22:29

## 2024-11-14 RX ADMIN — CALCITRIOL CAPSULES 0.25 MCG 0.5 MCG: 0.25 CAPSULE ORAL at 22:29

## 2024-11-14 RX ADMIN — MORPHINE SULFATE 2 MG: 2 INJECTION, SOLUTION INTRAMUSCULAR; INTRAVENOUS at 17:44

## 2024-11-14 ASSESSMENT — PAIN SCALES - GENERAL
PAINLEVEL_OUTOF10: 7
PAINLEVEL_OUTOF10: 2
PAINLEVEL_OUTOF10: 1
PAINLEVEL_OUTOF10: 8
PAINLEVEL_OUTOF10: 9
PAINLEVEL_OUTOF10: 8
PAINLEVEL_OUTOF10: 9
PAINLEVEL_OUTOF10: 9

## 2024-11-14 ASSESSMENT — PAIN DESCRIPTION - LOCATION
LOCATION: CHEST;BACK
LOCATION: FOOT;LEG;CHEST
LOCATION: FOOT
LOCATION: FOOT;LEG
LOCATION: CHEST;FOOT;LEG

## 2024-11-14 ASSESSMENT — PAIN DESCRIPTION - ORIENTATION
ORIENTATION: RIGHT;LEFT
ORIENTATION: LEFT
ORIENTATION: RIGHT;LEFT;MID
ORIENTATION: MID
ORIENTATION: LEFT

## 2024-11-14 ASSESSMENT — PAIN DESCRIPTION - DESCRIPTORS
DESCRIPTORS: STABBING;SHARP
DESCRIPTORS: PRESSURE;DULL;ACHING
DESCRIPTORS: SHARP
DESCRIPTORS: DULL;ACHING;PRESSURE
DESCRIPTORS: ACHING;DISCOMFORT

## 2024-11-14 NOTE — PLAN OF CARE
Problem: Safety - Adult  Goal: Free from fall injury  11/14/2024 0041 by Thalia Strange RN  Outcome: Progressing  11/13/2024 1518 by Coreen Pollock LPN  Outcome: Progressing     Problem: Chronic Conditions and Co-morbidities  Goal: Patient's chronic conditions and co-morbidity symptoms are monitored and maintained or improved  11/14/2024 0041 by Thalia Strange RN  Outcome: Progressing  11/13/2024 1518 by Coreen Pollock LPN  Outcome: Progressing     Problem: Discharge Planning  Goal: Discharge to home or other facility with appropriate resources  11/14/2024 0041 by Thalia Strange RN  Outcome: Progressing  11/13/2024 1518 by Coreen Pollock LPN  Outcome: Progressing     Problem: Pain  Goal: Verbalizes/displays adequate comfort level or baseline comfort level  11/14/2024 0041 by Thalia Strange RN  Outcome: Progressing  11/13/2024 1518 by Coreen Pollock LPN  Outcome: Progressing

## 2024-11-14 NOTE — PROGRESS NOTES
Nephrology Progress Note        [address] [facility], [address]  [place] [address]  Phone: [phone]  Office Hours: 8:30AM - 4:30PM  Monday - Friday 11/14/2024 6:36 AM  Subjective:   Admit Date: 11/11/2024  PCP: José Luis Izquierdo MD  Interval History:   On NC  Reports feeling better  Diet: ADULT DIET; Regular; 4 carb choices (60 gm/meal); No Added Salt (3-4 gm); 1500 ml      Data:   Scheduled Meds:   heparin (porcine)  5,000 Units SubCUTAneous BID    calcitRIOL  0.5 mcg Oral BID    hydrALAZINE  25 mg Oral BID    insulin glargine  15 Units SubCUTAneous Nightly    insulin lispro  0-8 Units SubCUTAneous 4x Daily AC & HS     Continuous Infusions:   dextrose       PRN Meds:albuterol sulfate HFA, glucose, dextrose bolus **OR** dextrose bolus, glucagon (rDNA), dextrose, oxyCODONE-acetaminophen **AND** oxyCODONE  I/O last 3 completed shifts:  In: 1000   Out: 7500   No intake/output data recorded.    Intake/Output Summary (Last 24 hours) at 11/14/2024 0636  Last data filed at 11/13/2024 1205  Gross per 24 hour   Intake 500 ml   Output 4000 ml   Net -3500 ml       CBC:   Recent Labs     11/11/24  0807   WBC 6.8   HGB 10.0*   *       BMP:    Recent Labs     11/11/24  0807 11/13/24  0810    139   K 4.5 4.0   CL 94* 98*   CO2 23 26   BUN 79* 45*   CREATININE 8.6* 5.2*   GLUCOSE 177* 146*   CALCIUM 8.5 9.5     Hepatic:   Recent Labs     11/11/24  0807   AST 15   ALT <5*   BILITOT 0.5   ALKPHOS 79           Objective:   Vitals: BP (!) 154/84   Pulse 72   Temp 97.7 °F (36.5 °C) (Oral)   Resp 17   Ht 1.753 m (5' 9\")   Wt 121.2 kg (267 lb 3.2 oz)   SpO2 100%   BMI 39.46 kg/m²   General appearance: alert and cooperative with exam, in no acute distress  HEENT: normocephalic, atraumatic,   Neck: supple, trachea midline  Lungs: breathing comfortably on nc  Abdomen: non distended,  Extremities: no BLE edema  Neurologic: alert, oriented, follows commands, interactive    MEDICAL DECISION MAKING

## 2024-11-14 NOTE — PROGRESS NOTES
Patient Name: Yovanny Levine  Patient : 1975  MRN: [id number]     Acct: [id number]  Date of Admission: 2024  Room/Bed: 4105/4105-A  Code Status:  Prior  Allergies:   Allergies   Allergen Reactions    Adhesive Tape Rash    Doxycycline Nausea And Vomiting    Reglan [Metoclopramide] Anxiety     Diagnosis:    Patient Active Problem List   Diagnosis    Hiatal hernia    Diabetes mellitus type 2, improved controlled    Diabetic neuropathy (HCC)    Panic attack    Anxiety associated with depression    Neck pain    DANIELA (obstructive sleep apnea)    Urinary frequency    Bilateral carpal tunnel syndrome- left worse than right    Hyperlipidemia with target LDL less than 100    Essential hypertension    Bronchitis    Osteomyelitis    Abscess    Periumbilical hernia    Sepsis (HCC)    Cellulitis of left foot    Constipation    Intractable nausea and vomiting    Uncontrolled type 2 diabetes mellitus    Nausea and vomiting    Diabetic foot infection (HCC)    Acute renal failure (ARF) ([facility])    Cellulitis    Cellulitis of left arm    Cellulitis, scrotum    Acute epididymitis    Irene gangrene    Recurrent major depressive disorder, in full remission ([facility])    Generalized anxiety disorder    Necrotizing fasciitis    Syncope    Right groin wound    Ulcer of right groin with fat layer exposed (HCC)    Diabetic ulcer of left midfoot associated with type 2 diabetes mellitus (HCC)    Nephrotic syndrome    Generalized edema    Stage 3b chronic kidney disease (HCC)    Diabetic nephropathy associated with type 2 diabetes mellitus (HCC)    Hypoalbuminemia    Chronic kidney disease, stage IV (severe) ([facility])    FH: CAD (coronary artery disease)    ASCVD (arteriosclerotic cardiovascular disease)    Other proteinuria    Chest pain    Surgical wound dehiscence    CARMEN (acute kidney injury) ([facility])    Anemia    Chest tightness    NSTEMI (non-ST elevated myocardial infarction) (HCC)    Diabetic foot ulcer with osteomyelitis ([facility])    ESRD  Concentrate Lot No.: [id number]  Acid Concentrate Lot No.: [id number]  Manual Ph: 7.2  Bleach Test (Neg): Yes  Bath Temperature: 96.8 °F (36 °C)  Tubing Lot#: [id number]  Conductivity Meter Serial #: [id number]  All Connections Secure?: Yes  Venous Parameters Set?: Yes  Arterial Parameters Set?: Yes  Saline Line Double Clamped?: Yes  Air Foam Detector Engaged?: Yes  Machine Functioning Alarm Free? Yes  Prime Given: 200ml    Chlorine Testing - Before each treatment and every 4 hours:   Treatment  Time On: 0735  Time Off: 1100  Treatment Goal: 3.5H  Weight - Scale: 121.2 kg (267 lb 3.2 oz) (11/11/24 1807)  1st check: less than 0.1 ppm at: 0700 hours  2nd check: less than 0.1 ppm at: 1000 hours  3rd check: Not Applicable  (if greater than 0.1 ppm, then check every 30 minutes from secondary)    Access Flows and Pressures  Patient Vitals for the past 8 hrs:   Blood Flow Rate (ml/min) Ultrafiltration Rate (ml/hr) Ultrafiltration Removed (ml) Arterial Pressure (mmHg) Venous Pressure (mmHg) TMP Comments Access Visible   11/14/24 0735 300 ml/min 1000 ml/hr 0 ml -80 mmHg 160 30 tx started Yes   11/14/24 0745 300 ml/min 1000 ml/hr 220 ml -80 mmHg 160 30 pt awake on phone Yes   11/14/24 0800 300 ml/min 1000 ml/hr 448 ml -110 mmHg 160 20 awake watching phone Yes   11/14/24 0815 300 ml/min 1000 ml/hr 703 ml -120 mmHg 160 20 lines secure Yes   11/14/24 0830 300 ml/min 1000 ml/hr 951 ml -120 mmHg 16 20 pt awake on phone Yes   11/14/24 0845 300 ml/min 1000 ml/hr 1198 ml -100 mmHg 160 20 pt adjusted in bed Yes   11/14/24 0900 300 ml/min 1000 ml/hr 1444 ml -100 mmHg 160 20 pt asleep Yes   11/14/24 0915 300 ml/min 1000 ml/hr 1698 ml -100 mmHg 160 20 pt resting with no needs Yes   11/14/24 0930 300 ml/min 1000 ml/hr 1967 ml -100 mmHg 160 20 lines secure Yes   11/14/24 0945 300 ml/min 1000 ml/hr 2190 ml -110 mmHg 160 20 resting Yes   11/14/24 1000 300 ml/min 1000 ml/hr 2452 ml -110 mmHg 160 20 no needs Yes   11/14/24 1015 300 ml/min 1000 ml/hr 2789

## 2024-11-14 NOTE — PROGRESS NOTES
TRANSFER - OUT REPORT:    Verbal report given to Teresa KELLY on Yovanny Levine being transferred back to #[id number] for routine post-op       Report consisted of patient's Situation, Background, Assessment and   Recommendations(SBAR).     Successful Right Thoracentesis in IR with Dr. Hernández at this time. Pt. tolerated well. 1,450cc clear, yellow fluid, removed from right lower posterior back. Specimen sent to lab as ordered. PCXR ordered. Dry dressing placed on right lower back after procedure. Report called to Kristina KELLY and pt. returned to his room with pt transport.      Information from the following report(s) Nurse Handoff Report was reviewed with the receiving nurse.    Opportunity for questions and clarification was provided.      Patient transported with:   Registered Nurse

## 2024-11-14 NOTE — PROGRESS NOTES
V2.0    [facility] Progress Note      Name:  Yovanny Levine /Age/Sex: 1975  (49 y.o. male)   MRN & CSN:  [id number] & [id number] Encounter Date/Time: 2024 3:00 PM EST   Location:  [address] [place] [address]-A PCP: José Luis Izquierdo MD     Attending:Bert Tinoco MD       Hospital Day: 4    Assessment and Recommendations   Yovanny Levine is a 49 y.o. male with pmh of type 2 diabetes, coronary artery disease, end-stage renal disease, hypertension, chronic osteomyelitis who presents with Acute hypoxic respiratory failure      Plan:     Acute hypoxic respiratory failure  -Patient uses 2 L oxygen just during the night  -Was hypoxic on arrival to the ER was placed on 2 L O2 via nasal cannula, during my encounter I wean him down to room air  -Likely secondary to fluid overload in the setting of end-stage kidney disease  -CT chest consistent with pulmonary edema with moderate to large pleural effusion.   -Dialysis today  -Repeat CXR after dialysis with no changes  -IR consult, plan for thoracentesis today  -Ativan 1 mg before thoracentesis     ESRD on hemodialysis  -Patient missed hemodialysis today, appointment was at 10:30 AM  -Nephro consulted plan for HD today   -Dialysis daily. Plan to remove 3-3.5 liters  -Continue Calcitrol  -Daily fluid restriction: 1500 ml per day  -Daily weight      History of chronic left foot osteomyelitis  -Goes to wound care every Tuesday, next appointment tomorrow  Will consult wound care while patient being admitted     Coronary artery disease with history of PCI and stenting to LAD and LCx continue Plavix statin and Coreg      Hyperlipidemia on statin     Chronic pain disorder on home Percocet,      Essential hypertension on home amlodipine carvedilol and hydralazine along with Imdur    Obesity class II with BMI 39.46  -Educated about lifestyle changes      Diet ADULT DIET; Regular; 4 carb choices (60 gm/meal); No Added Salt (3-4 gm); 1500 ml   DVT Prophylaxis [] Lovenox, [x]  Heparin, [] SCDs,  or hilar lymphadenopathy. Moderate to large right and moderate left pleural effusions. Hazy diffuse groundglass opacities are present. There is right lower lobe atelectasis. No consolidation or focal infiltrate. Nodular contour to the liver is suggestive of cirrhosis. Review of bone windows demonstrates no osteoblastic or lytic lesions.     No pulmonary embolism. Findings suggestive of pulmonary edema with moderate to large right and moderate left pleural effusions. Cirrhotic appearing liver. ** If there are any questions about this report, I can be reached on [employer] or at [phone] ** Electronically signed by KULWINDER WHITFIELD      CBC: No results for input(s): \"WBC\", \"HGB\", \"PLT\" in the last 72 hours.  BMP:    Recent Labs     11/13/24  0810      K 4.0   CL 98*   CO2 26   BUN 45*   CREATININE 5.2*   GLUCOSE 146*     Hepatic: No results for input(s): \"AST\", \"ALT\", \"BILITOT\", \"ALKPHOS\" in the last 72 hours.    Invalid input(s): \"ALB\"  Lipids:   Lab Results   Component Value Date/Time    CHOL 166 09/22/2022 02:02 PM    CHOL 168 10/15/2013 10:30 AM    HDL 30 09/22/2022 02:02 PM    TRIG 254 09/22/2022 02:02 PM     Hemoglobin A1C:   Lab Results   Component Value Date/Time    LABA1C 5.7 08/30/2024 02:57 AM     TSH: No results found for: \"TSH\"  Troponin:   Lab Results   Component Value Date/Time    TROPONINT 0.176 02/08/2023 05:45 AM    TROPONINT 0.183 02/07/2023 09:17 PM    TROPONINT 0.155 09/13/2022 11:49 AM     Lactic Acid: No results for input(s): \"LACTA\" in the last 72 hours.  BNP: No results for input(s): \"PROBNP\" in the last 72 hours.  UA:  Lab Results   Component Value Date/Time    NITRU NEGATIVE 08/28/2024 03:09 AM    COLORU YELLOW 08/28/2024 03:09 AM    PHUR 7.0 08/28/2024 03:09 AM    PHUR 6.5 06/20/2013 09:51 AM    WBCUA 3 08/28/2024 03:09 AM    RBCUA 1 08/28/2024 03:09 AM    MUCUS RARE 07/24/2022 11:40 PM    TRICHOMONAS NONE SEEN 08/28/2024 03:09 AM    BACTERIA RARE 08/28/2024 03:09 AM    CLARITYU CLEAR

## 2024-11-14 NOTE — PROGRESS NOTES
Pt arrived back to unit following thoracentesis and pt was c/o upper abdominal pain, he was rating it 9/10. Charge RN notified attending and EKG and 2mg Morphine were ordered and completed. CXR currently pending results.

## 2024-11-14 NOTE — PROGRESS NOTES
I did NOT see the patient. The patient was discussed with the RUSTY. I was available for questions and consultation as needed.       Getting HD today. Plan for IR thoracentesis.

## 2024-11-14 NOTE — CARE COORDINATION
Chart reviewed for continued discharge planning. Per previous CM notes, patient is from home w/ family, has PCP and insurance. Patient has reliable transportation through family and insurance. Patient uses home O2 from [employer], 3L baseline. Patient has OP HD T,TH, Sat per Dr. James's note. Discharge plan remains for patient to return home once medically ready. CM remains available should needs present.  
Planning   The Patient and/or Patient Representative was provided with a Choice of Provider? Patient   The Patient and/Or Patient Representative agree with the Discharge Plan? Yes   Freedom of Choice list was provided with basic dialogue that supports the patient's individualized plan of care/goals, treatment preferences, and shares the quality data associated with the providers?  Yes

## 2024-11-14 NOTE — CONSULTS
Consult Interventional Radiology    Date:2024  Name:Yovanny Levine   :1975   MR#:[id number]    SEX:male     Planned procedure:  right thoracentesis  Indication: right pleural effusion    H&P Status: H&P was reviewed, the patient was examined and no change has occurred in patient's condition since H&P was completed.    Past Medical History:  Past Medical History:   Diagnosis Date    Abscess     scrotal    Acute renal failure (ARF) (Formerly [facility]) 2019    Anxiety associated with depression     Anxiety associated with depression     Back pain 2012    CAD (coronary artery disease) 02/10/2023    Chest pain 2013    Diabetic nephropathy (HCC)     Diabetic neuropathy (Formerly [facility]) 2011    Diabetic ulcer of left midfoot associated with type 2 diabetes mellitus, with fat layer exposed ([facility]) 2017    Diverticulosis     C scope + Dr. Medina    DM (diabetes mellitus), type 2 (Formerly [facility])     DR. 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 Medina    Nausea and vomiting 2019    Necrotizing fasciitis     Nose fracture 1988    Panic attacks     Pericarditis     Hospitalized with s/p heart cath normal    Peripheral autonomic neuropathy due to diabetes mellitus (Formerly [facility])     Axonal EMG- NCS, 2011    Sebaceous cyst 2011    URI (upper respiratory infection) 2012    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 type 2 diabetes mellitus, with muscle involvement without evidence of necrosis ([facility]) 2021    WD-Wound, surgical, infected, initial encounter 2017    Wrist fracture 1986        Past Surgical History:  Past Surgical History:   Procedure Laterality Date    ABDOMEN SURGERY      CARDIAC CATHETERIZATION  ,     Normal (Dr. Winslow)     1 tablet by mouth daily 30 tablet 5    Lancets MISC 1 each by Does not apply route daily 100 each 3    glucose monitoring kit (FREESTYLE) monitoring kit 1 each by Does not apply route once for 1 dose. 1 kit 0       Review of Systems:  A 10 point review of systems was conducted and was negative with the exception of what is noted above.     Vital Signs:  Vitals:    11/14/24 1700 11/14/24 1715 11/14/24 1736 11/14/24 1744   BP: (!) 171/101 (!) 171/97 (!) 165/104    Pulse: 73 73 73    Resp: 15 13 14 15   Temp:       TempSrc:       SpO2: 97% 98% 95%    Weight:       Height:            Laboratory:  Recent Labs     11/13/24  0810 11/14/24  0115     --    CL 98*  --    CO2 26  --    BUN 45*  --    CREATININE 5.2*  --    GLUCOSE 146*  --    INR  --  1.2         Mallampati Score III  ASA class III    Medications reviewed: No contraindications for coagulation or sedation plans    IMAGING DATA   None available for review    ASSESSMENT AND PLAN     Pt is a good candidate for right thoracentesis      Procedure, RBA explained to patient and available family. Informed consent obtained    Electronically signed by Addy Hernández MD on 11/14/2024 at 6:21 PM

## 2024-11-15 ENCOUNTER — APPOINTMENT (OUTPATIENT)
Dept: GENERAL RADIOLOGY | Age: 49
DRG: 640 | End: 2024-11-15
Payer: MEDICARE

## 2024-11-15 VITALS
RESPIRATION RATE: 18 BRPM | SYSTOLIC BLOOD PRESSURE: 189 MMHG | HEART RATE: 88 BPM | BODY MASS INDEX: 39.58 KG/M2 | DIASTOLIC BLOOD PRESSURE: 101 MMHG | OXYGEN SATURATION: 94 % | TEMPERATURE: 98.2 F | HEIGHT: 69 IN | WEIGHT: 267.2 LBS

## 2024-11-15 LAB
ALBUMIN FLD-MCNC: 2.2 G/DL
AMYLASE FLD-CCNC: 21 U/L
EKG ATRIAL RATE: 74 BPM
EKG DIAGNOSIS: NORMAL
EKG P AXIS: 16 DEGREES
EKG P-R INTERVAL: 210 MS
EKG Q-T INTERVAL: 430 MS
EKG QRS DURATION: 98 MS
EKG QTC CALCULATION (BAZETT): 477 MS
EKG R AXIS: 49 DEGREES
EKG T AXIS: 57 DEGREES
EKG VENTRICULAR RATE: 74 BPM
GLUCOSE BLD-MCNC: 133 MG/DL (ref 74–99)
GLUCOSE BLD-MCNC: 146 MG/DL (ref 74–99)
GLUCOSE FLD-MCNC: 146 MG/DL
LDH FLD L TO P-CCNC: 106 U/L
PROT FLD-MCNC: 3.4 G/DL
SPECIMEN TYPE: NORMAL

## 2024-11-15 PROCEDURE — 90935 HEMODIALYSIS ONE EVALUATION: CPT

## 2024-11-15 PROCEDURE — 1200000000 HC SEMI PRIVATE

## 2024-11-15 PROCEDURE — 94150 VITAL CAPACITY TEST: CPT

## 2024-11-15 PROCEDURE — 96375 TX/PRO/DX INJ NEW DRUG ADDON: CPT

## 2024-11-15 PROCEDURE — 71045 X-RAY EXAM CHEST 1 VIEW: CPT

## 2024-11-15 PROCEDURE — 6370000000 HC RX 637 (ALT 250 FOR IP): Performed by: INTERNAL MEDICINE

## 2024-11-15 PROCEDURE — 6360000002 HC RX W HCPCS: Performed by: PREVENTIVE MEDICINE

## 2024-11-15 PROCEDURE — 6370000000 HC RX 637 (ALT 250 FOR IP): Performed by: STUDENT IN AN ORGANIZED HEALTH CARE EDUCATION/TRAINING PROGRAM

## 2024-11-15 PROCEDURE — 82962 GLUCOSE BLOOD TEST: CPT

## 2024-11-15 PROCEDURE — 2700000000 HC OXYGEN THERAPY PER DAY

## 2024-11-15 PROCEDURE — 94761 N-INVAS EAR/PLS OXIMETRY MLT: CPT

## 2024-11-15 PROCEDURE — 32555 ASPIRATE PLEURA W/ IMAGING: CPT

## 2024-11-15 PROCEDURE — 6360000002 HC RX W HCPCS: Performed by: NURSE PRACTITIONER

## 2024-11-15 PROCEDURE — 93010 ELECTROCARDIOGRAM REPORT: CPT | Performed by: INTERNAL MEDICINE

## 2024-11-15 RX ORDER — LIDOCAINE 4 G/G
1 PATCH TOPICAL DAILY
Qty: 3 EACH | Refills: 0 | Status: SHIPPED | OUTPATIENT
Start: 2024-11-15 | End: 2024-11-18

## 2024-11-15 RX ORDER — HYDROMORPHONE HYDROCHLORIDE 1 MG/ML
0.5 INJECTION, SOLUTION INTRAMUSCULAR; INTRAVENOUS; SUBCUTANEOUS ONCE
Status: COMPLETED | OUTPATIENT
Start: 2024-11-15 | End: 2024-11-15

## 2024-11-15 RX ORDER — LIDOCAINE 4 G/G
1 PATCH TOPICAL DAILY
Status: DISCONTINUED | OUTPATIENT
Start: 2024-11-15 | End: 2024-11-15 | Stop reason: HOSPADM

## 2024-11-15 RX ORDER — ONDANSETRON 2 MG/ML
4 INJECTION INTRAMUSCULAR; INTRAVENOUS EVERY 6 HOURS PRN
Status: DISCONTINUED | OUTPATIENT
Start: 2024-11-15 | End: 2024-11-15 | Stop reason: HOSPADM

## 2024-11-15 RX ORDER — LIDOCAINE 4 G/G
1 PATCH TOPICAL ONCE
Status: DISCONTINUED | OUTPATIENT
Start: 2024-11-15 | End: 2024-11-15 | Stop reason: HOSPADM

## 2024-11-15 RX ADMIN — HYDROMORPHONE HYDROCHLORIDE 0.5 MG: 1 INJECTION, SOLUTION INTRAMUSCULAR; INTRAVENOUS; SUBCUTANEOUS at 08:38

## 2024-11-15 RX ADMIN — OXYCODONE HYDROCHLORIDE 5 MG: 5 TABLET ORAL at 13:21

## 2024-11-15 RX ADMIN — ONDANSETRON 4 MG: 2 INJECTION INTRAMUSCULAR; INTRAVENOUS at 02:38

## 2024-11-15 RX ADMIN — OXYCODONE HYDROCHLORIDE 5 MG: 5 TABLET ORAL at 03:04

## 2024-11-15 RX ADMIN — OXYCODONE HYDROCHLORIDE AND ACETAMINOPHEN 1 TABLET: 5; 325 TABLET ORAL at 13:21

## 2024-11-15 RX ADMIN — ONDANSETRON 4 MG: 2 INJECTION INTRAMUSCULAR; INTRAVENOUS at 09:06

## 2024-11-15 RX ADMIN — OXYCODONE HYDROCHLORIDE AND ACETAMINOPHEN 1 TABLET: 5; 325 TABLET ORAL at 03:04

## 2024-11-15 ASSESSMENT — PAIN DESCRIPTION - LOCATION
LOCATION: HEAD;LEG
LOCATION: CHEST;FOOT;LEG

## 2024-11-15 ASSESSMENT — PAIN DESCRIPTION - DESCRIPTORS
DESCRIPTORS: SHOOTING;STABBING

## 2024-11-15 ASSESSMENT — PAIN SCALES - GENERAL
PAINLEVEL_OUTOF10: 7
PAINLEVEL_OUTOF10: 8
PAINLEVEL_OUTOF10: 8

## 2024-11-15 ASSESSMENT — PAIN DESCRIPTION - ORIENTATION
ORIENTATION: LEFT
ORIENTATION: LEFT
ORIENTATION: RIGHT;LEFT;LOWER;MID

## 2024-11-15 NOTE — PROGRESS NOTES
I did NOT see the patient. The patient was discussed with the RUSTY. I was available for questions and consultation as needed.       Had thoracentesis yesterday. Plan for dc after dialysis today.

## 2024-11-15 NOTE — DISCHARGE SUMMARY
V2.0  Discharge Summary    Name:  Yovanny Levine /Age/Sex: 1975 (49 y.o. male)   Admit Date: 2024  Discharge Date: 11/15/24    MRN & CSN:  [id number] & [id number] Encounter Date and Time 11/15/24 2:14 PM EST    Attending:  Bert Tinoco MD Discharging Provider: APPLE Wiseman [facility] Course:     Brief HPI: Yovanny Levine is a 49 y.o. male who presented with pmh of type 2 diabetes, coronary artery disease, end-stage renal disease, hypertension, chronic osteomyelitis who presents with acute onset shortness of breath which started this morning about 4:30 AM patient states that he woke up with it denies any chest pain denies any cough sputum production nausea vomiting fever or chills he also complains of some left foot pain which is somewhat chronic for him he states that his dialysis is supposed to be at 10:30 AM today but he missed it because he came to the emergency department.          Brief Problem Based Course:   Acute hypoxic respiratory failure  -Patient uses 2 L oxygen just during the night  -Was hypoxic on arrival to the ER was placed on 2 L O2 via nasal cannula, during my encounter I wean him down to room air  -Likely secondary to fluid overload in the setting of end-stage kidney disease  -CT chest consistent with pulmonary edema with moderate to large pleural effusion.   -Dialysis today  -Repeat CXR after dialysis with no changes  -IR consult, plan for thoracentesis today  -Ativan 1 mg before thoracentesis  -Successful Thoracentesis. Repeat CXR with showed small pleural effusion. No pneumothorax.   -Lidocaine patch at the incision site.      ESRD on hemodialysis  -Patient missed hemodialysis today, appointment was at 10:30 AM  -Nephro consulted plan for HD today   -Dialysis daily. Plan to remove 3-3.5 liters  -Continue Calcitrol  -Daily fluid restriction: 1500 ml per day  -Daily weight      History of chronic left foot osteomyelitis  -Goes to wound care every Tuesday,

## 2024-11-15 NOTE — PROGRESS NOTES
Nephrology Progress Note        [address], [address]  [place] [address]  Phone: [phone]  Office Hours: 8:30AM - 4:30PM  Monday - Friday        11/15/2024 7:27 AM  Subjective:   Admit Date: 11/11/2024  PCP: José Luis Izquierdo MD  Interval History:   On room air  Reports pleuritic pain s/p thoeracentesis yesterday    Diet: ADULT DIET; Regular; 4 carb choices (60 gm/meal); No Added Salt (3-4 gm); 1500 ml      Data:   Scheduled Meds:   heparin (porcine)  5,000 Units SubCUTAneous BID    calcitRIOL  0.5 mcg Oral BID    hydrALAZINE  25 mg Oral BID    insulin glargine  15 Units SubCUTAneous Nightly    insulin lispro  0-8 Units SubCUTAneous 4x Daily AC & HS     Continuous Infusions:   dextrose       PRN Meds:ondansetron, albuterol sulfate HFA, glucose, dextrose bolus **OR** dextrose bolus, glucagon (rDNA), dextrose, oxyCODONE-acetaminophen **AND** oxyCODONE  I/O last 3 completed shifts:  In: 500   Out: 3500   No intake/output data recorded.    Intake/Output Summary (Last 24 hours) at 11/15/2024 0727  Last data filed at 11/14/2024 1100  Gross per 24 hour   Intake 500 ml   Output 3500 ml   Net -3000 ml       CBC: No results for input(s): \"WBC\", \"HGB\", \"PLT\" in the last 72 hours.    BMP:    Recent Labs     11/13/24  0810      K 4.0   CL 98*   CO2 26   BUN 45*   CREATININE 5.2*   GLUCOSE 146*   CALCIUM 9.5     Hepatic: No results for input(s): \"AST\", \"ALT\", \"BILITOT\", \"ALKPHOS\" in the last 72 hours.    Invalid input(s): \"ALB\"  Troponin: No results for input(s): \"TROPONINI\" in the last 72 hours.  BNP: No results for input(s): \"BNP\" in the last 72 hours.  Lipids: No results for input(s): \"CHOL\", \"HDL\" in the last 72 hours.    Invalid input(s): \"LDLCALCU\"  ABGs:   Lab Results   Component Value Date/Time    PO2ART 119 06/18/2013 01:15 PM    MTT2TDJ 47.0 06/18/2013 01:15 PM     INR:   Recent Labs     11/14/24  0115   INR 1.2       Objective:   Vitals: BP (!) 168/94 Comment: notified nurse  Pulse 80

## 2024-11-15 NOTE — PLAN OF CARE
Problem: Safety - Adult  Goal: Free from fall injury  11/15/2024 1200 by Teresa Barbosa RN  Outcome: Progressing  11/15/2024 0045 by Nehal Arnold LPN  Outcome: Progressing     Problem: Chronic Conditions and Co-morbidities  Goal: Patient's chronic conditions and co-morbidity symptoms are monitored and maintained or improved  11/15/2024 1200 by Teresa Barbosa RN  Outcome: Progressing  11/15/2024 0045 by Nehal Arnold LPN  Outcome: Progressing     Problem: Discharge Planning  Goal: Discharge to home or other facility with appropriate resources  11/15/2024 1200 by Teresa Barbosa RN  Outcome: Progressing  11/15/2024 0045 by Nehal Arnold LPN  Outcome: Progressing     Problem: Pain  Goal: Verbalizes/displays adequate comfort level or baseline comfort level  11/15/2024 1200 by Teresa Barbosa RN  Outcome: Progressing  11/15/2024 0045 by Nehal Arnold LPN  Outcome: Not Progressing  Flowsheets (Taken 11/14/2024 2229)  Verbalizes/displays adequate comfort level or baseline comfort level: Encourage patient to monitor pain and request assistance     Problem: Pain  Goal: Verbalizes/displays adequate comfort level or baseline comfort level  11/15/2024 1200 by Teresa Barbosa RN  Outcome: Progressing  11/15/2024 0045 by Nehal Arnold LPN  Outcome: Not Progressing  Flowsheets (Taken 11/14/2024 2229)  Verbalizes/displays adequate comfort level or baseline comfort level: Encourage patient to monitor pain and request assistance

## 2024-11-15 NOTE — PROGRESS NOTES
Patient Name: Yovanny Levine  Patient : 1975  MRN: [id number]     Acct: [id number]  Date of Admission: 2024  Room/Bed: 4105/4105-A  Code Status:  Prior  Allergies:   Allergies   Allergen Reactions    Adhesive Tape Rash    Doxycycline Nausea And Vomiting    Reglan [Metoclopramide] Anxiety     Diagnosis:    Patient Active Problem List   Diagnosis    Hiatal hernia    Diabetes mellitus type 2, improved controlled    Diabetic neuropathy (HCC)    Panic attack    Anxiety associated with depression    Neck pain    DANIELA (obstructive sleep apnea)    Urinary frequency    Bilateral carpal tunnel syndrome- left worse than right    Hyperlipidemia with target LDL less than 100    Essential hypertension    Bronchitis    Osteomyelitis    Abscess    Periumbilical hernia    Sepsis (HCC)    Cellulitis of left foot    Constipation    Intractable nausea and vomiting    Uncontrolled type 2 diabetes mellitus    Nausea and vomiting    Diabetic foot infection (HCC)    Acute renal failure (ARF) ([facility])    Cellulitis    Cellulitis of left arm    Cellulitis, scrotum    Acute epididymitis    Irene gangrene    Recurrent major depressive disorder, in full remission ([facility])    Generalized anxiety disorder    Necrotizing fasciitis    Syncope    Right groin wound    Ulcer of right groin with fat layer exposed (HCC)    Diabetic ulcer of left midfoot associated with type 2 diabetes mellitus (HCC)    Nephrotic syndrome    Generalized edema    Stage 3b chronic kidney disease (HCC)    Diabetic nephropathy associated with type 2 diabetes mellitus (HCC)    Hypoalbuminemia    Chronic kidney disease, stage IV (severe) ([facility])    FH: CAD (coronary artery disease)    ASCVD (arteriosclerotic cardiovascular disease)    Other proteinuria    Chest pain    Surgical wound dehiscence    CARMEN (acute kidney injury) ([facility])    Anemia    Chest tightness    NSTEMI (non-ST elevated myocardial infarction) (HCC)    Diabetic foot ulcer with osteomyelitis ([facility])    ESRD

## 2024-11-17 LAB
MICROORGANISM SPEC CULT: NORMAL
MICROORGANISM/AGENT SPEC: NORMAL
SERVICE CMNT-IMP: NORMAL
SPECIMEN DESCRIPTION: NORMAL

## 2024-11-19 ENCOUNTER — HOSPITAL ENCOUNTER (OUTPATIENT)
Dept: WOUND CARE | Age: 49
Discharge: HOME | End: 2024-11-19
Attending: STUDENT IN AN ORGANIZED HEALTH CARE EDUCATION/TRAINING PROGRAM
Payer: MEDICARE

## 2024-11-19 VITALS
HEART RATE: 77 BPM | DIASTOLIC BLOOD PRESSURE: 91 MMHG | SYSTOLIC BLOOD PRESSURE: 155 MMHG | RESPIRATION RATE: 18 BRPM | TEMPERATURE: 97.4 F

## 2024-11-19 DIAGNOSIS — L97.422 DIABETIC ULCER OF LEFT MIDFOOT ASSOCIATED WITH TYPE 2 DIABETES MELLITUS, WITH FAT LAYER EXPOSED (HCC): Primary | ICD-10-CM

## 2024-11-19 DIAGNOSIS — Z79.4 TYPE 2 DIABETES MELLITUS WITH FOOT ULCER, WITH LONG-TERM CURRENT USE OF INSULIN (HCC): ICD-10-CM

## 2024-11-19 DIAGNOSIS — E11.621 DIABETIC ULCER OF LEFT MIDFOOT ASSOCIATED WITH TYPE 2 DIABETES MELLITUS, WITH FAT LAYER EXPOSED (HCC): Primary | ICD-10-CM

## 2024-11-19 DIAGNOSIS — E11.621 TYPE 2 DIABETES MELLITUS WITH FOOT ULCER, WITH LONG-TERM CURRENT USE OF INSULIN (HCC): ICD-10-CM

## 2024-11-19 DIAGNOSIS — L97.509 TYPE 2 DIABETES MELLITUS WITH FOOT ULCER, WITH LONG-TERM CURRENT USE OF INSULIN (HCC): ICD-10-CM

## 2024-11-19 DIAGNOSIS — N18.6 ESRD (END STAGE RENAL DISEASE) ON DIALYSIS (HCC): ICD-10-CM

## 2024-11-19 DIAGNOSIS — M86.672 CHRONIC REFRACTORY OSTEOMYELITIS OF FOOT, LEFT: ICD-10-CM

## 2024-11-19 DIAGNOSIS — Z89.422 ABSENCE OF TOE OF LEFT FOOT (HCC): ICD-10-CM

## 2024-11-19 DIAGNOSIS — Z99.2 ESRD (END STAGE RENAL DISEASE) ON DIALYSIS (HCC): ICD-10-CM

## 2024-11-19 DIAGNOSIS — M79.662 PAIN IN LEFT LOWER LEG: ICD-10-CM

## 2024-11-19 PROBLEM — L97.429 DIABETIC ULCER OF LEFT MIDFOOT ASSOCIATED WITH TYPE 2 DIABETES MELLITUS (HCC): Status: RESOLVED | Noted: 2021-09-21 | Resolved: 2024-11-19

## 2024-11-19 PROBLEM — L97.513 DIABETIC ULCER OF TOE OF RIGHT FOOT ASSOCIATED WITH TYPE 2 DIABETES MELLITUS, WITH NECROSIS OF MUSCLE (HCC): Status: RESOLVED | Noted: 2024-04-30 | Resolved: 2024-11-19

## 2024-11-19 PROBLEM — E08.621 DIABETIC ULCER OF LEFT MIDFOOT ASSOCIATED WITH DIABETES MELLITUS DUE TO UNDERLYING CONDITION, WITH FAT LAYER EXPOSED (HCC): Status: RESOLVED | Noted: 2024-01-12 | Resolved: 2024-11-19

## 2024-11-19 PROBLEM — E11.628 TYPE 2 DIABETES MELLITUS WITH SKIN COMPLICATION, WITH LONG-TERM CURRENT USE OF INSULIN (HCC): Status: ACTIVE | Noted: 2024-11-19

## 2024-11-19 PROCEDURE — 11042 DBRDMT SUBQ TIS 1ST 20SQCM/<: CPT | Performed by: NURSE PRACTITIONER

## 2024-11-19 PROCEDURE — 11042 DBRDMT SUBQ TIS 1ST 20SQCM/<: CPT

## 2024-11-19 RX ORDER — SODIUM CHLOR/HYPOCHLOROUS ACID 0.033 %
SOLUTION, IRRIGATION IRRIGATION ONCE
OUTPATIENT
Start: 2024-11-19 | End: 2024-11-19

## 2024-11-19 RX ORDER — BACITRACIN ZINC 500 [USP'U]/G
OINTMENT TOPICAL ONCE
OUTPATIENT
Start: 2024-11-19 | End: 2024-11-19

## 2024-11-19 RX ORDER — MUPIROCIN 20 MG/G
OINTMENT TOPICAL ONCE
OUTPATIENT
Start: 2024-11-19 | End: 2024-11-19

## 2024-11-19 RX ORDER — CLOBETASOL PROPIONATE 0.5 MG/G
OINTMENT TOPICAL ONCE
OUTPATIENT
Start: 2024-11-19 | End: 2024-11-19

## 2024-11-19 RX ORDER — BACITRACIN ZINC AND POLYMYXIN B SULFATE 500; 1000 [USP'U]/G; [USP'U]/G
OINTMENT TOPICAL ONCE
OUTPATIENT
Start: 2024-11-19 | End: 2024-11-19

## 2024-11-19 RX ORDER — SILVER SULFADIAZINE 10 MG/G
CREAM TOPICAL ONCE
OUTPATIENT
Start: 2024-11-19 | End: 2024-11-19

## 2024-11-19 RX ORDER — LIDOCAINE HYDROCHLORIDE 40 MG/ML
SOLUTION TOPICAL ONCE
OUTPATIENT
Start: 2024-11-19 | End: 2024-11-19

## 2024-11-19 RX ORDER — LIDOCAINE HYDROCHLORIDE 20 MG/ML
JELLY TOPICAL ONCE
OUTPATIENT
Start: 2024-11-19 | End: 2024-11-19

## 2024-11-19 RX ORDER — OXYCODONE AND ACETAMINOPHEN 10; 325 MG/1; MG/1
1 TABLET ORAL EVERY 6 HOURS PRN
Qty: 56 TABLET | Refills: 0 | Status: SHIPPED | OUTPATIENT
Start: 2024-11-19 | End: 2024-12-03

## 2024-11-19 RX ORDER — TRIAMCINOLONE ACETONIDE 1 MG/G
OINTMENT TOPICAL ONCE
OUTPATIENT
Start: 2024-11-19 | End: 2024-11-19

## 2024-11-19 RX ORDER — LIDOCAINE 50 MG/G
OINTMENT TOPICAL ONCE
OUTPATIENT
Start: 2024-11-19 | End: 2024-11-19

## 2024-11-19 RX ORDER — LIDOCAINE 40 MG/G
CREAM TOPICAL ONCE
OUTPATIENT
Start: 2024-11-19 | End: 2024-11-19

## 2024-11-19 RX ORDER — GENTAMICIN SULFATE 1 MG/G
OINTMENT TOPICAL ONCE
OUTPATIENT
Start: 2024-11-19 | End: 2024-11-19

## 2024-11-19 RX ORDER — NEOMYCIN/BACITRACIN/POLYMYXINB 3.5-400-5K
OINTMENT (GRAM) TOPICAL ONCE
OUTPATIENT
Start: 2024-11-19 | End: 2024-11-19

## 2024-11-19 RX ORDER — BETAMETHASONE DIPROPIONATE 0.5 MG/G
CREAM TOPICAL ONCE
OUTPATIENT
Start: 2024-11-19 | End: 2024-11-19

## 2024-11-19 ASSESSMENT — PAIN DESCRIPTION - FREQUENCY: FREQUENCY: CONTINUOUS

## 2024-11-19 ASSESSMENT — PAIN DESCRIPTION - DESCRIPTORS: DESCRIPTORS: SHARP

## 2024-11-19 ASSESSMENT — PAIN DESCRIPTION - PAIN TYPE: TYPE: CHRONIC PAIN

## 2024-11-19 ASSESSMENT — PAIN DESCRIPTION - ORIENTATION: ORIENTATION: LEFT

## 2024-11-19 ASSESSMENT — PAIN DESCRIPTION - ONSET: ONSET: ON-GOING

## 2024-11-19 ASSESSMENT — PAIN SCALES - GENERAL: PAINLEVEL_OUTOF10: 7

## 2024-11-19 ASSESSMENT — PAIN DESCRIPTION - LOCATION: LOCATION: FOOT

## 2024-11-19 NOTE — PROGRESS NOTES
.Multilayer Compression Wrap   (Not Unna) Below the Knee    NAME:  Yovanny Levine  YOB: 1975  MEDICAL RECORD NUMBER:  [id number]  DATE:  11/19/2024    Multilayer compression wrap: Removed old Multilayer wrap if indicated and wash leg with mild soap/water.  Applied moisturizing agent to dry skin as needed.   Applied primary and secondary dressing as ordered.  Applied multilayered dressing below the knee to left lower leg.  Instructed patient/caregiver not to remove dressing and to keep it clean and dry.   Instructed patient/caregiver on complications to report to provider, such as pain, numbness in toes, heavy drainage, and slippage of dressing.  Instructed patient on purpose of compression dressing and on activity and exercise recommendations.      Electronically signed by Letha Goodman RN on 11/19/2024 at 11:41 AM   
MD Мария at [facility] OR    TOE AMPUTATION Left 2024    left 5th TOE AMPUTATION performed by Claudy Forrest DO at [facility] OR    TONSILLECTOMY AND ADENOIDECTOMY      UPPER GASTROINTESTINAL ENDOSCOPY  2011    Mild gastritis with moderatley severe antritis, small hiatal hernia, Dr. Medina    UPPER GASTROINTESTINAL ENDOSCOPY N/A 2019    EGD BIOPSY performed by Jose Maria Cornelius MD at [facility] ENDOSCOPY    UPPER GASTROINTESTINAL ENDOSCOPY N/A 2022    EGD DIAGNOSTIC ONLY performed by Jose Maria Cornelius MD at [facility] ENDOSCOPY    UPPER GASTROINTESTINAL ENDOSCOPY N/A 2023    EGD BIOPSY performed by Mia AMBRIZ MD at [facility] ENDOSCOPY       FAMILY HISTORY    Family History   Problem Relation Age of Onset    Cancer Mother         ?site    Stroke Mother     Bleeding Prob Mother     Diabetes Father     Heart Disease Father     High Blood Pressure Father     Obesity Father     Kidney Disease Father     Diabetes Sister     High Blood Pressure Sister     Mental Illness Sister     Obesity Sister     High Blood Pressure Other     Mental Illness Other         bipolar    Other Son         cyst in ear canal    ADHD Daughter        SOCIAL HISTORY    Social History     Tobacco Use    Smoking status: Former     Current packs/day: 0.00     Types: Cigarettes     Start date: 2002     Quit date: 2022     Years since quittin.3     Passive exposure: Never    Smokeless tobacco: Never    Tobacco comments:     Smokes when drinking/social   Vaping Use    Vaping status: Never Used   Substance Use Topics    Alcohol use: Not Currently     Comment: occ    Drug use: Yes     Frequency: 7.0 times per week     Types: Marijuana (Weed)     Comment: 21 @        ALLERGIES    Allergies   Allergen Reactions    Adhesive Tape Rash    Doxycycline Nausea And Vomiting    Reglan [Metoclopramide] Anxiety       MEDICATIONS    Current Outpatient Medications on File Prior to Encounter   Medication Sig Dispense Refill    calcium carbonate

## 2024-11-19 NOTE — PATIENT INSTRUCTIONS
PHYSICIAN ORDERS AND DISCHARGE INSTRUCTIONS     Wound cleansing:              Do not scrub or use excessive force.             Wash hands with soap and water before and after dressing changes.             Prior to applying a clean dressing, cleanse wound with normal saline,               wound cleanser, or mild soap and water.              Ask the physician or nurse before getting the wound(s) wet in a shower                      Wound Care Notes:  Sees Dr Kumar.                Applied for Kerecis grafts on 24  Donated Kerecis applied to left lateral foot 24                             Orders for this week: 2024    Left Lateral Distal plantar wound: Wash with soap and water, pat dry.  Paint with betadine.  Tuck Puracol Ag to wound bed (and in fold of skin).  Bolster/secure with medipore tape.  Cover with ca alginate and agile.  Cover with 2 ABD pads.  Wrap with coban 2 full (may use ace wrap if coban 2 full not available).  Change on Tuesday         Plan: HBO workup started 9/10/24.   [facility] discharge due to not home bound 24.   Toenails 24. Called senait about abx. No answer 10/28/24       Follow Up Instructions: At the [facility] in 1 weeks.    Primary Wound Care Provider: Shelly Rubalcava CNP  Call  for any questions or concerns.  Central [phone] for imaging lab work

## 2024-11-22 LAB — NON-GYN CYTOLOGY REPORT: NORMAL

## 2024-11-26 ENCOUNTER — HOSPITAL ENCOUNTER (OUTPATIENT)
Dept: WOUND CARE | Age: 49
Discharge: HOME | End: 2024-11-26
Attending: STUDENT IN AN ORGANIZED HEALTH CARE EDUCATION/TRAINING PROGRAM
Payer: MEDICARE

## 2024-11-26 DIAGNOSIS — N18.6 ESRD (END STAGE RENAL DISEASE) ON DIALYSIS (HCC): ICD-10-CM

## 2024-11-26 DIAGNOSIS — E11.621 TYPE 2 DIABETES MELLITUS WITH FOOT ULCER, WITH LONG-TERM CURRENT USE OF INSULIN (HCC): ICD-10-CM

## 2024-11-26 DIAGNOSIS — Z79.4 TYPE 2 DIABETES MELLITUS WITH FOOT ULCER, WITH LONG-TERM CURRENT USE OF INSULIN (HCC): ICD-10-CM

## 2024-11-26 DIAGNOSIS — L97.422 DIABETIC ULCER OF LEFT MIDFOOT ASSOCIATED WITH TYPE 2 DIABETES MELLITUS, WITH FAT LAYER EXPOSED (HCC): Primary | ICD-10-CM

## 2024-11-26 DIAGNOSIS — M79.662 PAIN IN LEFT LOWER LEG: ICD-10-CM

## 2024-11-26 DIAGNOSIS — E11.621 DIABETIC ULCER OF LEFT MIDFOOT ASSOCIATED WITH TYPE 2 DIABETES MELLITUS, WITH FAT LAYER EXPOSED (HCC): Primary | ICD-10-CM

## 2024-11-26 DIAGNOSIS — L97.509 TYPE 2 DIABETES MELLITUS WITH FOOT ULCER, WITH LONG-TERM CURRENT USE OF INSULIN (HCC): ICD-10-CM

## 2024-11-26 DIAGNOSIS — Z89.422 ABSENCE OF TOE OF LEFT FOOT (HCC): ICD-10-CM

## 2024-11-26 DIAGNOSIS — M86.672 CHRONIC REFRACTORY OSTEOMYELITIS OF FOOT, LEFT: ICD-10-CM

## 2024-11-26 DIAGNOSIS — Z99.2 ESRD (END STAGE RENAL DISEASE) ON DIALYSIS (HCC): ICD-10-CM

## 2024-11-26 PROCEDURE — 11042 DBRDMT SUBQ TIS 1ST 20SQCM/<: CPT

## 2024-11-26 PROCEDURE — 11042 DBRDMT SUBQ TIS 1ST 20SQCM/<: CPT | Performed by: NURSE PRACTITIONER

## 2024-11-26 RX ORDER — BETAMETHASONE DIPROPIONATE 0.5 MG/G
CREAM TOPICAL ONCE
OUTPATIENT
Start: 2024-11-26 | End: 2024-11-26

## 2024-11-26 RX ORDER — BACITRACIN ZINC AND POLYMYXIN B SULFATE 500; 1000 [USP'U]/G; [USP'U]/G
OINTMENT TOPICAL ONCE
OUTPATIENT
Start: 2024-11-26 | End: 2024-11-26

## 2024-11-26 RX ORDER — LIDOCAINE HYDROCHLORIDE 20 MG/ML
JELLY TOPICAL ONCE
OUTPATIENT
Start: 2024-11-26 | End: 2024-11-26

## 2024-11-26 RX ORDER — LIDOCAINE HYDROCHLORIDE 40 MG/ML
SOLUTION TOPICAL ONCE
OUTPATIENT
Start: 2024-11-26 | End: 2024-11-26

## 2024-11-26 RX ORDER — OXYCODONE AND ACETAMINOPHEN 10; 325 MG/1; MG/1
1 TABLET ORAL EVERY 6 HOURS PRN
Qty: 28 TABLET | Refills: 0 | Status: SHIPPED | OUTPATIENT
Start: 2024-12-04 | End: 2024-12-18

## 2024-11-26 RX ORDER — SILVER SULFADIAZINE 10 MG/G
CREAM TOPICAL ONCE
OUTPATIENT
Start: 2024-11-26 | End: 2024-11-26

## 2024-11-26 RX ORDER — GENTAMICIN SULFATE 1 MG/G
OINTMENT TOPICAL ONCE
OUTPATIENT
Start: 2024-11-26 | End: 2024-11-26

## 2024-11-26 RX ORDER — TRIAMCINOLONE ACETONIDE 1 MG/G
OINTMENT TOPICAL ONCE
OUTPATIENT
Start: 2024-11-26 | End: 2024-11-26

## 2024-11-26 RX ORDER — SODIUM CHLOR/HYPOCHLOROUS ACID 0.033 %
SOLUTION, IRRIGATION IRRIGATION ONCE
OUTPATIENT
Start: 2024-11-26 | End: 2024-11-26

## 2024-11-26 RX ORDER — CLOBETASOL PROPIONATE 0.5 MG/G
OINTMENT TOPICAL ONCE
OUTPATIENT
Start: 2024-11-26 | End: 2024-11-26

## 2024-11-26 RX ORDER — LIDOCAINE 50 MG/G
OINTMENT TOPICAL ONCE
OUTPATIENT
Start: 2024-11-26 | End: 2024-11-26

## 2024-11-26 RX ORDER — MUPIROCIN 20 MG/G
OINTMENT TOPICAL ONCE
OUTPATIENT
Start: 2024-11-26 | End: 2024-11-26

## 2024-11-26 RX ORDER — LIDOCAINE 40 MG/G
CREAM TOPICAL ONCE
OUTPATIENT
Start: 2024-11-26 | End: 2024-11-26

## 2024-11-26 RX ORDER — BACITRACIN ZINC 500 [USP'U]/G
OINTMENT TOPICAL ONCE
OUTPATIENT
Start: 2024-11-26 | End: 2024-11-26

## 2024-11-26 RX ORDER — NEOMYCIN/BACITRACIN/POLYMYXINB 3.5-400-5K
OINTMENT (GRAM) TOPICAL ONCE
OUTPATIENT
Start: 2024-11-26 | End: 2024-11-26

## 2024-11-26 NOTE — PATIENT INSTRUCTIONS
PHYSICIAN ORDERS AND DISCHARGE INSTRUCTIONS     Wound cleansing:              Do not scrub or use excessive force.             Wash hands with soap and water before and after dressing changes.             Prior to applying a clean dressing, cleanse wound with normal saline,               wound cleanser, or mild soap and water.              Ask the physician or nurse before getting the wound(s) wet in a shower                      Wound Care Notes:  Sees Dr Kumar.                Applied for Kerecis grafts on 24  Donated Kerecis applied to left lateral foot 24                             Orders for this week: 2024    Left Lateral Distal plantar wound: Wash with soap and water, pat dry.  Paint with betadine.  Tuck Puracol Ag to wound bed (and in fold of skin).  Bolster/secure with medipore tape.  Cover with ca alginate and agile.  Cover with 2 ABD pads.  Wrap with coban 2 full (may use ace wrap if coban 2 full not available).  Change on Tuesday         Plan: HBO workup started 9/10/24.   [facility] discharge due to not home bound 24.   Toenails 24. Called [facility]ait about abx. No answer 10/28/24       Follow Up Instructions: At the [facility] Center in 1 weeks.    Primary Wound Care Provider: Shelly Rubalcava CNP  Call  for any questions or concerns.  Central [phone] for imaging lab work

## 2024-11-26 NOTE — PROGRESS NOTES
.Multilayer Compression Wrap   (Not Unna) Below the Knee    NAME:  Yovanny Levine  YOB: 1975  MEDICAL RECORD NUMBER:  [id number]  DATE:  11/26/2024    Multilayer compression wrap: Removed old Multilayer wrap if indicated and wash leg with mild soap/water.  Applied moisturizing agent to dry skin as needed.   Applied primary and secondary dressing as ordered.  Applied multilayered dressing below the knee to left lower leg.  Instructed patient/caregiver not to remove dressing and to keep it clean and dry.   Instructed patient/caregiver on complications to report to provider, such as pain, numbness in toes, heavy drainage, and slippage of dressing.  Instructed patient on purpose of compression dressing and on activity and exercise recommendations.      Electronically signed by Letha Goodman RN on 11/26/2024 at 11:18 AM   
11/26/24 1100   Wound Depth (cm) 0.1 cm 11/26/24 1100   Wound Surface Area (cm^2) 1.4 cm^2 11/26/24 1100   Change in Wound Size % (l*w) -40 11/26/24 1100   Wound Volume (cm^3) 0.14 cm^3 11/26/24 1100   Wound Healing % -40 11/26/24 1100   Post-Procedure Length (cm) 1 cm 11/26/24 1111   Post-Procedure Width (cm) 1.4 cm 11/26/24 1111   Post-Procedure Depth (cm) 0.1 cm 11/26/24 1111   Post-Procedure Surface Area (cm^2) 1.4 cm^2 11/26/24 1111   Post-Procedure Volume (cm^3) 0.14 cm^3 11/26/24 1111   Distance Tunneling (cm) 0 cm 11/26/24 1100   Tunneling Position ___ O'Clock 0 11/26/24 1100   Undermining Starts ___ O'Clock 0 11/26/24 1100   Undermining Ends___ O'Clock 0 11/26/24 1100   Undermining Maxium Distance (cm) 0 11/26/24 1100   Wound Assessment Dry;Pink/red 11/26/24 1100   Drainage Amount None (dry) 11/26/24 1100   Odor None 11/26/24 1100   Shazia-wound Assessment Fragile;Intact 11/26/24 1100   Margins Attached edges 11/26/24 1100   Wound Thickness Description not for Pressure Injury Full thickness 11/26/24 1100   Number of days: 14       Total  Area  Debrided: 0.5 sq cm     Bleeding:  Minimal    Hemostasis Achieved:  by pressure    Procedural Pain:  0  / 10     Post Procedural Pain:  0 / 10     Response to treatment:  Well tolerated by patient.       Plan:     Patient Instructions   PHYSICIAN ORDERS AND DISCHARGE INSTRUCTIONS     Wound cleansing:              Do not scrub or use excessive force.             Wash hands with soap and water before and after dressing changes.             Prior to applying a clean dressing, cleanse wound with normal saline,               wound cleanser, or mild soap and water.              Ask the physician or nurse before getting the wound(s) wet in a shower                      Wound Care Notes:  Sees Dr Kumar.                Applied for Kerecis grafts on 8/13/24  Donated Kerecis applied to left lateral foot 8/13/24                             Orders for this week: 11/26/2024    Left

## 2024-12-03 ENCOUNTER — HOSPITAL ENCOUNTER (OUTPATIENT)
Dept: WOUND CARE | Age: 49
Discharge: HOME | End: 2024-12-03
Attending: STUDENT IN AN ORGANIZED HEALTH CARE EDUCATION/TRAINING PROGRAM

## 2024-12-03 NOTE — PATIENT INSTRUCTIONS
PHYSICIAN ORDERS AND DISCHARGE INSTRUCTIONS     Wound cleansing:              Do not scrub or use excessive force.             Wash hands with soap and water before and after dressing changes.             Prior to applying a clean dressing, cleanse wound with normal saline,               wound cleanser, or mild soap and water.              Ask the physician or nurse before getting the wound(s) wet in a shower                      Wound Care Notes:  Sees Dr Kumar.                Applied for Kerecis grafts on 24  Donated Kerecis applied to left lateral foot 24                             Orders for this week: 12/3/2024    Left Lateral Distal plantar wound: Wash with soap and water, pat dry.  Paint with betadine.  Tuck Puracol Ag to wound bed (and in fold of skin).  Bolster/secure with medipore tape.  Cover with ca alginate and agile.  Cover with 2 ABD pads.  Wrap with coban 2 full (may use ace wrap if coban 2 full not available).  Change on Tuesday         Plan: HBO workup started 9/10/24.   [facility] discharge due to not home bound 24.   Toenails 24. Called [facility] about abx. No answer 10/28/24       Follow Up Instructions: 1 weeks  Primary Wound Care Provider: Shelly Rubalcava CNP  Call  for any questions or concerns.  Central [phone] for imaging lab work

## 2024-12-12 ENCOUNTER — HOSPITAL ENCOUNTER (OUTPATIENT)
Dept: HYPERBARIC MEDICINE | Age: 49
Discharge: HOME | End: 2024-12-12

## 2024-12-12 ENCOUNTER — HOSPITAL ENCOUNTER (OUTPATIENT)
Dept: WOUND CARE | Age: 49
Discharge: HOME | End: 2024-12-12
Attending: STUDENT IN AN ORGANIZED HEALTH CARE EDUCATION/TRAINING PROGRAM
Payer: MEDICARE

## 2024-12-12 VITALS
TEMPERATURE: 97 F | DIASTOLIC BLOOD PRESSURE: 98 MMHG | RESPIRATION RATE: 18 BRPM | SYSTOLIC BLOOD PRESSURE: 161 MMHG | HEART RATE: 86 BPM

## 2024-12-12 DIAGNOSIS — M86.9 DIABETIC FOOT ULCER WITH OSTEOMYELITIS (HCC): ICD-10-CM

## 2024-12-12 DIAGNOSIS — Z89.422 ABSENCE OF TOE OF LEFT FOOT (HCC): Primary | ICD-10-CM

## 2024-12-12 DIAGNOSIS — L97.509 DIABETIC FOOT ULCER WITH OSTEOMYELITIS (HCC): ICD-10-CM

## 2024-12-12 DIAGNOSIS — E11.69 DIABETIC FOOT ULCER WITH OSTEOMYELITIS (HCC): ICD-10-CM

## 2024-12-12 DIAGNOSIS — E11.621 DIABETIC FOOT ULCER WITH OSTEOMYELITIS (HCC): ICD-10-CM

## 2024-12-12 PROCEDURE — 11042 DBRDMT SUBQ TIS 1ST 20SQCM/<: CPT

## 2024-12-12 PROCEDURE — 6370000000 HC RX 637 (ALT 250 FOR IP): Performed by: SURGERY

## 2024-12-12 RX ORDER — NEOMYCIN/BACITRACIN/POLYMYXINB 3.5-400-5K
OINTMENT (GRAM) TOPICAL ONCE
OUTPATIENT
Start: 2024-12-12 | End: 2024-12-12

## 2024-12-12 RX ORDER — TRIAMCINOLONE ACETONIDE 1 MG/G
OINTMENT TOPICAL ONCE
OUTPATIENT
Start: 2024-12-12 | End: 2024-12-12

## 2024-12-12 RX ORDER — GENTAMICIN SULFATE 1 MG/G
OINTMENT TOPICAL ONCE
Status: COMPLETED | OUTPATIENT
Start: 2024-12-12 | End: 2024-12-12

## 2024-12-12 RX ORDER — OXYCODONE AND ACETAMINOPHEN 7.5; 325 MG/1; MG/1
1 TABLET ORAL EVERY 8 HOURS PRN
Qty: 42 TABLET | Refills: 0 | Status: ON HOLD | OUTPATIENT
Start: 2024-12-12 | End: 2024-12-26

## 2024-12-12 RX ORDER — BETAMETHASONE DIPROPIONATE 0.5 MG/G
CREAM TOPICAL ONCE
OUTPATIENT
Start: 2024-12-12 | End: 2024-12-12

## 2024-12-12 RX ORDER — GENTAMICIN SULFATE 1 MG/G
OINTMENT TOPICAL ONCE
OUTPATIENT
Start: 2024-12-12 | End: 2024-12-12

## 2024-12-12 RX ORDER — MUPIROCIN 20 MG/G
OINTMENT TOPICAL ONCE
OUTPATIENT
Start: 2024-12-12 | End: 2024-12-12

## 2024-12-12 RX ORDER — CLOBETASOL PROPIONATE 0.5 MG/G
OINTMENT TOPICAL ONCE
OUTPATIENT
Start: 2024-12-12 | End: 2024-12-12

## 2024-12-12 RX ORDER — LIDOCAINE HYDROCHLORIDE 40 MG/ML
SOLUTION TOPICAL ONCE
OUTPATIENT
Start: 2024-12-12 | End: 2024-12-12

## 2024-12-12 RX ORDER — LIDOCAINE HYDROCHLORIDE 20 MG/ML
JELLY TOPICAL ONCE
OUTPATIENT
Start: 2024-12-12 | End: 2024-12-12

## 2024-12-12 RX ORDER — LIDOCAINE 40 MG/G
CREAM TOPICAL ONCE
OUTPATIENT
Start: 2024-12-12 | End: 2024-12-12

## 2024-12-12 RX ORDER — BACITRACIN ZINC AND POLYMYXIN B SULFATE 500; 1000 [USP'U]/G; [USP'U]/G
OINTMENT TOPICAL ONCE
OUTPATIENT
Start: 2024-12-12 | End: 2024-12-12

## 2024-12-12 RX ORDER — SILVER SULFADIAZINE 10 MG/G
CREAM TOPICAL ONCE
OUTPATIENT
Start: 2024-12-12 | End: 2024-12-12

## 2024-12-12 RX ORDER — BACITRACIN ZINC 500 [USP'U]/G
OINTMENT TOPICAL ONCE
OUTPATIENT
Start: 2024-12-12 | End: 2024-12-12

## 2024-12-12 RX ORDER — SODIUM CHLOR/HYPOCHLOROUS ACID 0.033 %
SOLUTION, IRRIGATION IRRIGATION ONCE
OUTPATIENT
Start: 2024-12-12 | End: 2024-12-12

## 2024-12-12 RX ORDER — LIDOCAINE 50 MG/G
OINTMENT TOPICAL ONCE
OUTPATIENT
Start: 2024-12-12 | End: 2024-12-12

## 2024-12-12 RX ADMIN — GENTAMICIN SULFATE: 1 OINTMENT TOPICAL at 11:39

## 2024-12-12 ASSESSMENT — PAIN SCALES - WONG BAKER: WONGBAKER_NUMERICALRESPONSE: NO HURT

## 2024-12-12 ASSESSMENT — PAIN SCALES - GENERAL: PAINLEVEL_OUTOF10: 0

## 2024-12-12 NOTE — PROGRESS NOTES
Multilayer Compression Wrap   (Not Unna) Below the Knee    NAME:  Yovanny Levine  YOB: 1975  MEDICAL RECORD NUMBER:  [id number]  DATE:  12/12/2024    Multilayer compression wrap: Removed old Multilayer wrap if indicated and wash leg with mild soap/water.  Applied moisturizing agent to dry skin as needed.   Applied primary and secondary dressing as ordered.  Applied multilayered dressing below the knee to right lower leg.  Applied multilayered dressing below the knee to left lower leg.  Instructed patient/caregiver not to remove dressing and to keep it clean and dry.   Instructed patient/caregiver on complications to report to provider, such as pain, numbness in toes, heavy drainage, and slippage of dressing.  Instructed patient on purpose of compression dressing and on activity and exercise recommendations.      Electronically signed by Ankit Dan RN on 12/12/2024 at 11:42 AM

## 2024-12-12 NOTE — PROGRESS NOTES
Spoke with patient via telephone yesterday, reports having trouble finding transportation to come to HBO appointments; states\" I have moved, the transport company was picking me up as a favor at my other address , the place I'm living now is out of the way and they can't pick me up. My son works second shift and is too tired to bring me ; I want to come and finish but having trouble getting there \". This SN instructed patient to discuss with QIAN Bravo during clinic appointment on Thursday. Patient voiced understanding to do so.

## 2024-12-12 NOTE — PLAN OF CARE
Problem: Pain  Goal: Verbalizes/displays adequate comfort level or baseline comfort level  12/12/2024 1143 by Ankit Dan, RN  Outcome: Progressing  12/12/2024 1142 by Ankit Dan, RN  Outcome: Progressing

## 2024-12-13 ENCOUNTER — HOSPITAL ENCOUNTER (OUTPATIENT)
Dept: SLEEP CENTER | Age: 49
Discharge: HOME | End: 2024-12-13
Payer: MEDICARE

## 2024-12-13 DIAGNOSIS — G47.33 OBSTRUCTIVE SLEEP APNEA: ICD-10-CM

## 2024-12-13 PROCEDURE — 95810 POLYSOM 6/> YRS 4/> PARAM: CPT

## 2024-12-14 ENCOUNTER — APPOINTMENT (OUTPATIENT)
Dept: GENERAL RADIOLOGY | Age: 49
DRG: 321 | End: 2024-12-14
Payer: MEDICARE

## 2024-12-14 ENCOUNTER — APPOINTMENT (OUTPATIENT)
Dept: GENERAL RADIOLOGY | Age: 49
DRG: 321 | End: 2024-12-14
Attending: EMERGENCY MEDICINE
Payer: MEDICARE

## 2024-12-14 ENCOUNTER — APPOINTMENT (OUTPATIENT)
Dept: ULTRASOUND IMAGING | Age: 49
DRG: 321 | End: 2024-12-14
Payer: MEDICARE

## 2024-12-14 ENCOUNTER — HOSPITAL ENCOUNTER (INPATIENT)
Age: 49
LOS: 3 days | Discharge: HOME | DRG: 321 | End: 2024-12-19
Attending: STUDENT IN AN ORGANIZED HEALTH CARE EDUCATION/TRAINING PROGRAM | Admitting: HOSPITALIST
Payer: MEDICARE

## 2024-12-14 DIAGNOSIS — R06.02 SHORTNESS OF BREATH: ICD-10-CM

## 2024-12-14 DIAGNOSIS — N18.6 ESRD (END STAGE RENAL DISEASE) ON DIALYSIS (HCC): ICD-10-CM

## 2024-12-14 DIAGNOSIS — I21.4 NSTEMI, INITIAL EPISODE OF CARE (HCC): ICD-10-CM

## 2024-12-14 DIAGNOSIS — I25.10 CORONARY ARTERY DISEASE DUE TO LIPID RICH PLAQUE: ICD-10-CM

## 2024-12-14 DIAGNOSIS — M79.605 LEFT LEG PAIN: ICD-10-CM

## 2024-12-14 DIAGNOSIS — I25.83 CORONARY ARTERY DISEASE DUE TO LIPID RICH PLAQUE: ICD-10-CM

## 2024-12-14 DIAGNOSIS — R07.9 CHEST PAIN, UNSPECIFIED TYPE: ICD-10-CM

## 2024-12-14 DIAGNOSIS — Z99.2 ESRD (END STAGE RENAL DISEASE) ON DIALYSIS (HCC): ICD-10-CM

## 2024-12-14 DIAGNOSIS — R79.89 ELEVATED TROPONIN: ICD-10-CM

## 2024-12-14 DIAGNOSIS — I20.9 ANGINA PECTORIS (HCC): ICD-10-CM

## 2024-12-14 DIAGNOSIS — J18.9 PNEUMONIA OF RIGHT LOWER LOBE DUE TO INFECTIOUS ORGANISM: Primary | ICD-10-CM

## 2024-12-14 LAB
ALBUMIN SERPL-MCNC: 3.9 G/DL (ref 3.4–5)
ALBUMIN/GLOB SERPL: 1.4 {RATIO} (ref 1.1–2.2)
ALP SERPL-CCNC: 80 U/L (ref 40–129)
ALT SERPL-CCNC: 13 U/L (ref 10–40)
ANION GAP SERPL CALCULATED.3IONS-SCNC: 16 MMOL/L (ref 9–17)
AST SERPL-CCNC: 23 U/L (ref 15–37)
B PARAP IS1001 DNA NPH QL NAA+NON-PROBE: NOT DETECTED
B PERT DNA SPEC QL NAA+PROBE: NOT DETECTED
BASOPHILS # BLD: 0.04 K/UL
BASOPHILS NFR BLD: 1 % (ref 0–1)
BILIRUB SERPL-MCNC: 0.7 MG/DL (ref 0–1)
BNP SERPL-MCNC: ABNORMAL PG/ML (ref 0–125)
BUN SERPL-MCNC: 48 MG/DL (ref 7–20)
C PNEUM DNA NPH QL NAA+NON-PROBE: NOT DETECTED
CALCIUM SERPL-MCNC: 8.2 MG/DL (ref 8.3–10.6)
CHLORIDE SERPL-SCNC: 93 MMOL/L (ref 99–110)
CO2 SERPL-SCNC: 29 MMOL/L (ref 21–32)
CREAT SERPL-MCNC: 5.8 MG/DL (ref 0.9–1.3)
EOSINOPHIL # BLD: 0.27 K/UL
EOSINOPHILS RELATIVE PERCENT: 4 % (ref 0–3)
ERYTHROCYTE [DISTWIDTH] IN BLOOD BY AUTOMATED COUNT: 17.4 % (ref 11.7–14.9)
FLUAV RNA NPH QL NAA+NON-PROBE: NOT DETECTED
FLUBV RNA NPH QL NAA+NON-PROBE: NOT DETECTED
GFR, ESTIMATED: 10 ML/MIN/1.73M2
GLUCOSE BLD-MCNC: 142 MG/DL (ref 74–99)
GLUCOSE BLD-MCNC: 191 MG/DL (ref 74–99)
GLUCOSE SERPL-MCNC: 152 MG/DL (ref 74–99)
HADV DNA NPH QL NAA+NON-PROBE: NOT DETECTED
HCOV 229E RNA NPH QL NAA+NON-PROBE: NOT DETECTED
HCOV HKU1 RNA NPH QL NAA+NON-PROBE: NOT DETECTED
HCOV NL63 RNA NPH QL NAA+NON-PROBE: NOT DETECTED
HCOV OC43 RNA NPH QL NAA+NON-PROBE: NOT DETECTED
HCT VFR BLD AUTO: 35.7 % (ref 42–52)
HGB BLD-MCNC: 11.3 G/DL (ref 13.5–18)
HMPV RNA NPH QL NAA+NON-PROBE: NOT DETECTED
HPIV1 RNA NPH QL NAA+NON-PROBE: NOT DETECTED
HPIV2 RNA NPH QL NAA+NON-PROBE: NOT DETECTED
HPIV3 RNA NPH QL NAA+NON-PROBE: NOT DETECTED
HPIV4 RNA NPH QL NAA+NON-PROBE: NOT DETECTED
IMM GRANULOCYTES # BLD AUTO: 0.03 K/UL
IMM GRANULOCYTES NFR BLD: 0 %
INFLUENZA A BY PCR: NOT DETECTED
INFLUENZA B BY PCR: NOT DETECTED
LYMPHOCYTES NFR BLD: 0.45 K/UL
LYMPHOCYTES RELATIVE PERCENT: 7 % (ref 24–44)
M PNEUMO DNA NPH QL NAA+NON-PROBE: NOT DETECTED
MCH RBC QN AUTO: 29.4 PG (ref 27–31)
MCHC RBC AUTO-ENTMCNC: 31.7 G/DL (ref 32–36)
MCV RBC AUTO: 93 FL (ref 78–100)
MONOCYTES NFR BLD: 0.78 K/UL
MONOCYTES NFR BLD: 11 % (ref 0–4)
NEUTROPHILS NFR BLD: 77 % (ref 36–66)
NEUTS SEG NFR BLD: 5.3 K/UL
PLATELET # BLD AUTO: 137 K/UL (ref 140–440)
PMV BLD AUTO: 10.1 FL (ref 7.5–11.1)
POTASSIUM SERPL-SCNC: 4 MMOL/L (ref 3.5–5.1)
PROCALCITONIN SERPL-MCNC: 0.71 NG/ML
PROT SERPL-MCNC: 6.7 G/DL (ref 6.4–8.2)
RBC # BLD AUTO: 3.84 M/UL (ref 4.6–6.2)
RSV RNA NPH QL NAA+NON-PROBE: NOT DETECTED
RV+EV RNA NPH QL NAA+NON-PROBE: NOT DETECTED
SARS-COV-2 RDRP RESP QL NAA+PROBE: NOT DETECTED
SARS-COV-2 RNA NPH QL NAA+NON-PROBE: NOT DETECTED
SODIUM SERPL-SCNC: 138 MMOL/L (ref 136–145)
SPECIMEN DESCRIPTION: NORMAL
SPECIMEN DESCRIPTION: NORMAL
TROPONIN I SERPL HS-MCNC: 149 NG/L (ref 0–22)
TROPONIN I SERPL HS-MCNC: 164 NG/L (ref 0–22)
TROPONIN I SERPL HS-MCNC: 164 NG/L (ref 0–22)
WBC OTHER # BLD: 6.9 K/UL (ref 4–10.5)

## 2024-12-14 PROCEDURE — 99285 EMERGENCY DEPT VISIT HI MDM: CPT

## 2024-12-14 PROCEDURE — 6370000000 HC RX 637 (ALT 250 FOR IP)

## 2024-12-14 PROCEDURE — 85025 COMPLETE CBC W/AUTO DIFF WBC: CPT

## 2024-12-14 PROCEDURE — 96367 TX/PROPH/DG ADDL SEQ IV INF: CPT

## 2024-12-14 PROCEDURE — 71045 X-RAY EXAM CHEST 1 VIEW: CPT

## 2024-12-14 PROCEDURE — 2580000003 HC RX 258: Performed by: HOSPITALIST

## 2024-12-14 PROCEDURE — 6370000000 HC RX 637 (ALT 250 FOR IP): Performed by: STUDENT IN AN ORGANIZED HEALTH CARE EDUCATION/TRAINING PROGRAM

## 2024-12-14 PROCEDURE — 36415 COLL VENOUS BLD VENIPUNCTURE: CPT

## 2024-12-14 PROCEDURE — G0378 HOSPITAL OBSERVATION PER HR: HCPCS

## 2024-12-14 PROCEDURE — 80053 COMPREHEN METABOLIC PANEL: CPT

## 2024-12-14 PROCEDURE — 96375 TX/PRO/DX INJ NEW DRUG ADDON: CPT

## 2024-12-14 PROCEDURE — 6360000002 HC RX W HCPCS: Performed by: HOSPITALIST

## 2024-12-14 PROCEDURE — 93005 ELECTROCARDIOGRAM TRACING: CPT | Performed by: EMERGENCY MEDICINE

## 2024-12-14 PROCEDURE — 94640 AIRWAY INHALATION TREATMENT: CPT

## 2024-12-14 PROCEDURE — 6360000002 HC RX W HCPCS: Performed by: STUDENT IN AN ORGANIZED HEALTH CARE EDUCATION/TRAINING PROGRAM

## 2024-12-14 PROCEDURE — 84145 PROCALCITONIN (PCT): CPT

## 2024-12-14 PROCEDURE — 2580000003 HC RX 258: Performed by: STUDENT IN AN ORGANIZED HEALTH CARE EDUCATION/TRAINING PROGRAM

## 2024-12-14 PROCEDURE — 6370000000 HC RX 637 (ALT 250 FOR IP): Performed by: INTERNAL MEDICINE

## 2024-12-14 PROCEDURE — 87502 INFLUENZA DNA AMP PROBE: CPT

## 2024-12-14 PROCEDURE — 82962 GLUCOSE BLOOD TEST: CPT

## 2024-12-14 PROCEDURE — 2700000000 HC OXYGEN THERAPY PER DAY

## 2024-12-14 PROCEDURE — 93926 LOWER EXTREMITY STUDY: CPT

## 2024-12-14 PROCEDURE — 87635 SARS-COV-2 COVID-19 AMP PRB: CPT

## 2024-12-14 PROCEDURE — 87633 RESP VIRUS 12-25 TARGETS: CPT

## 2024-12-14 PROCEDURE — 87205 SMEAR GRAM STAIN: CPT

## 2024-12-14 PROCEDURE — 99223 1ST HOSP IP/OBS HIGH 75: CPT | Performed by: INTERNAL MEDICINE

## 2024-12-14 PROCEDURE — 83880 ASSAY OF NATRIURETIC PEPTIDE: CPT

## 2024-12-14 PROCEDURE — 6370000000 HC RX 637 (ALT 250 FOR IP): Performed by: HOSPITALIST

## 2024-12-14 PROCEDURE — 87449 NOS EACH ORGANISM AG IA: CPT

## 2024-12-14 PROCEDURE — 87899 AGENT NOS ASSAY W/OPTIC: CPT

## 2024-12-14 PROCEDURE — 96365 THER/PROPH/DIAG IV INF INIT: CPT

## 2024-12-14 PROCEDURE — 94761 N-INVAS EAR/PLS OXIMETRY MLT: CPT

## 2024-12-14 PROCEDURE — 73590 X-RAY EXAM OF LOWER LEG: CPT

## 2024-12-14 PROCEDURE — 96372 THER/PROPH/DIAG INJ SC/IM: CPT

## 2024-12-14 PROCEDURE — 93971 EXTREMITY STUDY: CPT

## 2024-12-14 PROCEDURE — 0202U NFCT DS 22 TRGT SARS-COV-2: CPT

## 2024-12-14 PROCEDURE — 84484 ASSAY OF TROPONIN QUANT: CPT

## 2024-12-14 PROCEDURE — 87070 CULTURE OTHR SPECIMN AEROBIC: CPT

## 2024-12-14 RX ORDER — SODIUM CHLORIDE 0.9 % (FLUSH) 0.9 %
5-40 SYRINGE (ML) INJECTION EVERY 12 HOURS SCHEDULED
Status: DISCONTINUED | OUTPATIENT
Start: 2024-12-14 | End: 2024-12-19 | Stop reason: HOSPADM

## 2024-12-14 RX ORDER — CALCIUM CARBONATE 500 MG/1
2 TABLET, CHEWABLE ORAL DAILY
Status: DISCONTINUED | OUTPATIENT
Start: 2024-12-14 | End: 2024-12-15

## 2024-12-14 RX ORDER — SODIUM CHLORIDE 0.9 % (FLUSH) 0.9 %
5-40 SYRINGE (ML) INJECTION PRN
Status: DISCONTINUED | OUTPATIENT
Start: 2024-12-14 | End: 2024-12-19 | Stop reason: HOSPADM

## 2024-12-14 RX ORDER — DEXTROSE MONOHYDRATE 100 MG/ML
INJECTION, SOLUTION INTRAVENOUS CONTINUOUS PRN
Status: DISCONTINUED | OUTPATIENT
Start: 2024-12-14 | End: 2024-12-19 | Stop reason: HOSPADM

## 2024-12-14 RX ORDER — HEPARIN SODIUM 5000 [USP'U]/ML
5000 INJECTION, SOLUTION INTRAVENOUS; SUBCUTANEOUS EVERY 8 HOURS SCHEDULED
Status: DISCONTINUED | OUTPATIENT
Start: 2024-12-14 | End: 2024-12-19 | Stop reason: HOSPADM

## 2024-12-14 RX ORDER — MAGNESIUM SULFATE IN WATER 40 MG/ML
2000 INJECTION, SOLUTION INTRAVENOUS ONCE
Status: COMPLETED | OUTPATIENT
Start: 2024-12-14 | End: 2024-12-14

## 2024-12-14 RX ORDER — INSULIN LISPRO 100 [IU]/ML
0-8 INJECTION, SOLUTION INTRAVENOUS; SUBCUTANEOUS
Status: DISCONTINUED | OUTPATIENT
Start: 2024-12-14 | End: 2024-12-19 | Stop reason: HOSPADM

## 2024-12-14 RX ORDER — INSULIN GLARGINE 100 [IU]/ML
10 INJECTION, SOLUTION SUBCUTANEOUS NIGHTLY
Status: DISCONTINUED | OUTPATIENT
Start: 2024-12-14 | End: 2024-12-19 | Stop reason: HOSPADM

## 2024-12-14 RX ORDER — ATORVASTATIN CALCIUM 40 MG/1
40 TABLET, FILM COATED ORAL NIGHTLY
Status: DISCONTINUED | OUTPATIENT
Start: 2024-12-14 | End: 2024-12-19 | Stop reason: HOSPADM

## 2024-12-14 RX ORDER — METOPROLOL TARTRATE 25 MG/1
12.5 TABLET, FILM COATED ORAL 2 TIMES DAILY
Status: DISCONTINUED | OUTPATIENT
Start: 2024-12-14 | End: 2024-12-16

## 2024-12-14 RX ORDER — OXYCODONE AND ACETAMINOPHEN 5; 325 MG/1; MG/1
1 TABLET ORAL ONCE
Status: COMPLETED | OUTPATIENT
Start: 2024-12-14 | End: 2024-12-14

## 2024-12-14 RX ORDER — ONDANSETRON 4 MG/1
4 TABLET, ORALLY DISINTEGRATING ORAL EVERY 8 HOURS PRN
Status: DISCONTINUED | OUTPATIENT
Start: 2024-12-14 | End: 2024-12-19 | Stop reason: HOSPADM

## 2024-12-14 RX ORDER — ASPIRIN 81 MG/1
81 TABLET, CHEWABLE ORAL DAILY
Status: DISCONTINUED | OUTPATIENT
Start: 2024-12-15 | End: 2024-12-16

## 2024-12-14 RX ORDER — ALBUTEROL SULFATE 0.83 MG/ML
2.5 SOLUTION RESPIRATORY (INHALATION)
Status: DISCONTINUED | OUTPATIENT
Start: 2024-12-14 | End: 2024-12-18

## 2024-12-14 RX ORDER — SODIUM CHLORIDE 9 MG/ML
INJECTION, SOLUTION INTRAVENOUS PRN
Status: DISCONTINUED | OUTPATIENT
Start: 2024-12-14 | End: 2024-12-16

## 2024-12-14 RX ORDER — ALBUTEROL SULFATE 90 UG/1
2 INHALANT RESPIRATORY (INHALATION) 4 TIMES DAILY PRN
Status: DISCONTINUED | OUTPATIENT
Start: 2024-12-14 | End: 2024-12-19 | Stop reason: HOSPADM

## 2024-12-14 RX ORDER — POLYETHYLENE GLYCOL 3350 17 G/17G
17 POWDER, FOR SOLUTION ORAL DAILY PRN
Status: DISCONTINUED | OUTPATIENT
Start: 2024-12-14 | End: 2024-12-19 | Stop reason: HOSPADM

## 2024-12-14 RX ORDER — ASPIRIN 81 MG/1
TABLET, CHEWABLE ORAL
Status: COMPLETED
Start: 2024-12-14 | End: 2024-12-14

## 2024-12-14 RX ORDER — OXYCODONE AND ACETAMINOPHEN 7.5; 325 MG/1; MG/1
1 TABLET ORAL EVERY 8 HOURS PRN
Status: DISCONTINUED | OUTPATIENT
Start: 2024-12-14 | End: 2024-12-19 | Stop reason: HOSPADM

## 2024-12-14 RX ORDER — DOXYCYCLINE HYCLATE 100 MG
100 TABLET ORAL ONCE
Status: COMPLETED | OUTPATIENT
Start: 2024-12-14 | End: 2024-12-14

## 2024-12-14 RX ORDER — GUAIFENESIN/DEXTROMETHORPHAN 100-10MG/5
5 SYRUP ORAL EVERY 4 HOURS PRN
Status: DISCONTINUED | OUTPATIENT
Start: 2024-12-14 | End: 2024-12-19 | Stop reason: HOSPADM

## 2024-12-14 RX ORDER — ONDANSETRON 2 MG/ML
4 INJECTION INTRAMUSCULAR; INTRAVENOUS EVERY 6 HOURS PRN
Status: DISCONTINUED | OUTPATIENT
Start: 2024-12-14 | End: 2024-12-19 | Stop reason: HOSPADM

## 2024-12-14 RX ORDER — GLUCAGON 1 MG/ML
1 KIT INJECTION PRN
Status: DISCONTINUED | OUTPATIENT
Start: 2024-12-14 | End: 2024-12-19 | Stop reason: HOSPADM

## 2024-12-14 RX ADMIN — HEPARIN SODIUM 5000 UNITS: 5000 INJECTION INTRAVENOUS; SUBCUTANEOUS at 13:15

## 2024-12-14 RX ADMIN — AZITHROMYCIN MONOHYDRATE 500 MG: 500 INJECTION, POWDER, LYOPHILIZED, FOR SOLUTION INTRAVENOUS at 11:59

## 2024-12-14 RX ADMIN — ALBUTEROL SULFATE 2.5 MG: 2.5 SOLUTION RESPIRATORY (INHALATION) at 12:08

## 2024-12-14 RX ADMIN — INSULIN LISPRO 2 UNITS: 100 INJECTION, SOLUTION INTRAVENOUS; SUBCUTANEOUS at 20:28

## 2024-12-14 RX ADMIN — OXYCODONE HYDROCHLORIDE AND ACETAMINOPHEN 1 TABLET: 5; 325 TABLET ORAL at 07:22

## 2024-12-14 RX ADMIN — ASPIRIN 324 MG: 81 TABLET, CHEWABLE ORAL at 05:58

## 2024-12-14 RX ADMIN — INSULIN LISPRO 2 UNITS: 100 INJECTION, SOLUTION INTRAVENOUS; SUBCUTANEOUS at 11:56

## 2024-12-14 RX ADMIN — INSULIN GLARGINE 10 UNITS: 100 INJECTION, SOLUTION SUBCUTANEOUS at 20:29

## 2024-12-14 RX ADMIN — OXYCODONE AND ACETAMINOPHEN 1 TABLET: 7.5; 325 TABLET ORAL at 16:04

## 2024-12-14 RX ADMIN — ALBUTEROL SULFATE 2.5 MG: 2.5 SOLUTION RESPIRATORY (INHALATION) at 20:55

## 2024-12-14 RX ADMIN — METOPROLOL TARTRATE 12.5 MG: 25 TABLET, FILM COATED ORAL at 20:28

## 2024-12-14 RX ADMIN — SODIUM CHLORIDE, PRESERVATIVE FREE 10 ML: 5 INJECTION INTRAVENOUS at 20:28

## 2024-12-14 RX ADMIN — HEPARIN SODIUM 5000 UNITS: 5000 INJECTION INTRAVENOUS; SUBCUTANEOUS at 20:28

## 2024-12-14 RX ADMIN — DOXYCYCLINE HYCLATE 100 MG: 100 TABLET, COATED ORAL at 07:22

## 2024-12-14 RX ADMIN — CEFTRIAXONE 1000 MG: 1 INJECTION, POWDER, FOR SOLUTION INTRAMUSCULAR; INTRAVENOUS at 07:22

## 2024-12-14 RX ADMIN — MAGNESIUM SULFATE HEPTAHYDRATE 2000 MG: 40 INJECTION, SOLUTION INTRAVENOUS at 07:28

## 2024-12-14 RX ADMIN — ATORVASTATIN CALCIUM 40 MG: 40 TABLET, FILM COATED ORAL at 20:28

## 2024-12-14 RX ADMIN — SODIUM CHLORIDE, PRESERVATIVE FREE 10 ML: 5 INJECTION INTRAVENOUS at 13:15

## 2024-12-14 RX ADMIN — CALCIUM CARBONATE 1000 MG: 500 TABLET, CHEWABLE ORAL at 11:56

## 2024-12-14 RX ADMIN — ALBUTEROL SULFATE 2.5 MG: 2.5 SOLUTION RESPIRATORY (INHALATION) at 15:41

## 2024-12-14 ASSESSMENT — PAIN SCALES - GENERAL
PAINLEVEL_OUTOF10: 7

## 2024-12-14 ASSESSMENT — PAIN DESCRIPTION - ORIENTATION: ORIENTATION: LEFT

## 2024-12-14 ASSESSMENT — PAIN - FUNCTIONAL ASSESSMENT
PAIN_FUNCTIONAL_ASSESSMENT: 0-10
PAIN_FUNCTIONAL_ASSESSMENT: ACTIVITIES ARE NOT PREVENTED

## 2024-12-14 ASSESSMENT — PAIN DESCRIPTION - LOCATION: LOCATION: FOOT

## 2024-12-14 ASSESSMENT — PAIN DESCRIPTION - PAIN TYPE: TYPE: CHRONIC PAIN

## 2024-12-14 ASSESSMENT — PAIN DESCRIPTION - FREQUENCY: FREQUENCY: CONTINUOUS

## 2024-12-14 ASSESSMENT — PAIN DESCRIPTION - DESCRIPTORS: DESCRIPTORS: STABBING

## 2024-12-14 ASSESSMENT — PAIN DESCRIPTION - ONSET: ONSET: ON-GOING

## 2024-12-14 NOTE — ED NOTES
Lab called to let nurse no no one was contacted for a critical high troponin at 6am. Lab told we were aware and second troponin already sent.

## 2024-12-14 NOTE — ED TRIAGE NOTES
PT reports to ED from a sleep study. Sleep study location called EMS. Pt woke with chest pain 10/10 that radiates. EMS administered 324 aspirin and 1 nitro. PT presents with 20 ga IV right forearm. 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 Pt reports this is not the first time having this type of chest pain. Pt is a/o x4, speech clear.   EMS states 12 lead EKG completed with no ST elevations or depressions.

## 2024-12-14 NOTE — CONSULTS
INPATIENT CARDIOLOGY CONSULT NOTE       Reason for consultation:  Chest Pain         Chief Complaint   Patient presents with    Chest Pain       History of present illness:Yovanny is a 49 y.o.year old who  presents with  Chief Complaint   Patient presents with    Chest Pain     Patient is a 49-year-old gentleman with prior medical history significant for coronary disease status post PCI to LAD and circumflex artery, history of essential hypertension hyperlipidemia presents to the hospital with chief complaint of chest pain and shortness of breath.  Patient is on oxygen 2 L at night, he mentioned that he was undergoing sleep study when he started experiencing retrosternal chest pain/pressure and was brought to the hospital for further evaluation  Unfortunately patient has not been following up with cardiology over the years however mentions that he has been compliant with medication  Patient also has history of end-stage renal disease on hemodialysis with chronically elevated cardiac troponin, history of chronic left foot osteomyelitis under wound care           Past medical history:    has a past medical history of Abscess, Acute renal failure (ARF) (HCC), Anxiety associated with depression, Anxiety associated with depression, Back pain, CAD (coronary artery disease), Chest pain, Diabetic nephropathy (HCC), Diabetic neuropathy (HCC), Diabetic ulcer of left midfoot associated with type 2 diabetes mellitus, with fat layer exposed (HCC), Diverticulosis, DM (diabetes mellitus), type 2 (HCC), 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, H/O percutaneous left heart catheterization, Hyperlipidemia LDL goal < 100, Hypertension, Internal hemorrhoid, Nausea and vomiting, Necrotizing fasciitis, Nose fracture, Panic attacks, Pericarditis, Peripheral autonomic neuropathy due to diabetes mellitus (HCC), Sebaceous cyst, URI (upper respiratory infection), 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 type 2 diabetes mellitus, with  injection vial 10 Units, Nightly  insulin lispro (HUMALOG,ADMELOG) injection vial 0-8 Units, 4x Daily AC & HS  oxyCODONE-acetaminophen (PERCOCET) 7.5-325 MG per tablet 1 tablet, Q8H PRN  sodium chloride flush 0.9 % injection 5-40 mL, 2 times per day  sodium chloride flush 0.9 % injection 5-40 mL, PRN  0.9 % sodium chloride infusion, PRN  ondansetron (ZOFRAN-ODT) disintegrating tablet 4 mg, Q8H PRN   Or  ondansetron (ZOFRAN) injection 4 mg, Q6H PRN  polyethylene glycol (GLYCOLAX) packet 17 g, Daily PRN  [START ON 12/15/2024] aspirin chewable tablet 81 mg, Daily  atorvastatin (LIPITOR) tablet 40 mg, Nightly  albuterol (PROVENTIL) (2.5 MG/3ML) 0.083% nebulizer solution 2.5 mg, Q4H WA RT  guaiFENesin-dextromethorphan (ROBITUSSIN DM) 100-10 MG/5ML syrup 5 mL, Q4H PRN  [START ON 12/15/2024] cefTRIAXone (ROCEPHIN) 1,000 mg in sterile water 10 mL IV syringe, Q24H   And  azithromycin (ZITHROMAX) 500 mg in sodium chloride 0.9 % 250 mL IVPB ([id number]), Q24H  heparin (porcine) injection 5,000 Units, 3 times per day        Current Facility-Administered Medications   Medication Dose Route Frequency Provider Last Rate Last Admin    albuterol sulfate HFA (PROVENTIL;VENTOLIN;PROAIR) 108 (90 Base) MCG/ACT inhaler 2 puff  2 puff Inhalation 4x Daily PRN Bert Tinoco MD        calcium carbonate (TUMS) chewable tablet 1,000 mg  2 tablet Oral Daily Bert Tinoco MD   1,000 mg at 12/14/24 1156    insulin glargine (LANTUS) injection vial 10 Units  10 Units SubCUTAneous Nightly Bert Tinoco MD        insulin lispro (HUMALOG,ADMELOG) injection vial 0-8 Units  0-8 Units SubCUTAneous 4x Daily AC & HS Bert Tinoco MD   2 Units at 12/14/24 1156    oxyCODONE-acetaminophen (PERCOCET) 7.5-325 MG per tablet 1 tablet  1 tablet Oral Q8H PRN Bert Tinoco MD        sodium chloride flush 0.9 % injection 5-40 mL  5-40 mL IntraVENous 2 times per day Bert Tinoco MD   10 mL at 12/14/24 1315    sodium chloride flush 0.9 % injection 5-40 mL  5-40 mL  old with PMH of  has a past medical history of Abscess, Acute renal failure (ARF) (HCC), Anxiety associated with depression, Anxiety associated with depression, Back pain, CAD (coronary artery disease), Chest pain, Diabetic nephropathy (HCC), Diabetic neuropathy (HCC), Diabetic ulcer of left midfoot associated with type 2 diabetes mellitus, with fat layer exposed (HCC), Diverticulosis, DM (diabetes mellitus), type 2 (HCC), 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       Recommendations:      Atypical chest pain  History of coronary disease status post PCI with no recent follow-ups  Essential hypertension  Hyperlipidemia  History of chronic left foot osteomyelitis  End-stage renal disease on hemodialysis  Chronic pain disorder  Acute on chronic respiratory failure with hypoxia  Former smoker    Plan for stress test and echocardiogram Monday morning  Obtain lower extremity arterial duplex ultrasound  Continue aspirin 81 mg daily  Continue Lipitor 40 mg daily  Start patient on beta-blocker: Metoprolol tartrate 12.5 twice daily  Risk factor modification    Thank you for the consult    Dr. TARUN Gomez  12/14/2024 2:36 PM

## 2024-12-14 NOTE — ED PROVIDER NOTES
Emergency Department Encounter        Pt Name: Yovanny Levine  MRN: [id number]  Birthdate 1975  Date of evaluation: 12/14/2024  ED Physician: South Mix MD    CHIEF COMPLAINT     Triage Chief Complaint:   Chest Pain      HISTORY OF PRESENT ILLNESS & REVIEW OF SYSTEMS     History obtained from the patient and staff.    Yovanny Levine is a 49 y.o. male who presents to the emergency department for evaluation of chest pain.  Says that he has had some chest pain intermittently for the past few weeks.  Says that it is worse over the past couple days.  Says it, feels sharp.  Not worse with exertion.  Denies any cough denies any fevers denies any shortness of breath.  Says he does get dialysis Monday Wednesday Friday did get a full session yesterday.  Says that he is taking Percocet for pain and is about due for pain medication.  Denies any previous MI CVA or blood clot.  Says he normally wears 3 L supplemental oxygen baseline        Patient denies any new Headache, Fever, Chills, Cough, Shortness of breath, Abdominal pain, Nausea, Vomiting, Diarrhea, Constipation, and Leg swelling.    The patient has no other acute complaints at this time.  Review of systems as above.          PAST MED/SURG/SOCIAL/FAM HISTORY & ALLERGY & MEDICATIONS     Past Medical History:   Diagnosis Date    Abscess 2010    scrotal    Acute renal failure (ARF) ([facility]) 08/04/2019    Anxiety associated with depression     Anxiety associated with depression     Back pain 07/02/2012    CAD (coronary artery disease) 02/10/2023    Chest pain 05/01/2013    Diabetic nephropathy ([facility])     Diabetic neuropathy ([facility]) 12/22/2011    Diabetic ulcer of left midfoot associated with type 2 diabetes mellitus, with fat layer exposed ([facility]) 07/18/2017    Diverticulosis     C scope + Dr. Medina    DM (diabetes mellitus), type 2 ([facility]) 2002    DR. 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    Current Outpatient Medications:     oxyCODONE-acetaminophen (PERCOCET) 7.5-325 MG per tablet, Take 1 tablet by mouth every 8 hours as needed for Pain for up to 14 days. Intended supply: 30 days Max Daily Amount: 3 tablets, Disp: 42 tablet, Rfl: 0    oxyCODONE-acetaminophen (PERCOCET)  MG per tablet, Take 1 tablet by mouth every 6 hours as needed for Pain for up to 14 days. Intended supply: 14 days Max Daily Amount: 4 tablets, Disp: 28 tablet, Rfl: 0    calcium carbonate (TUMS) 500 MG chewable tablet, Take 2 tablets by mouth daily, Disp: , Rfl:     OXYGEN, Inhale 2 L into the lungs at bedtime, Disp: , Rfl:     calcitRIOL (ROCALTROL) 0.5 MCG capsule, Take 1 capsule by mouth 2 times daily, Disp: 30 capsule, Rfl: 0    gabapentin (NEURONTIN) 100 MG capsule, Take 1 capsule by mouth 3 times daily for 30 days., Disp: 90 capsule, Rfl: 0    insulin lispro (HUMALOG,ADMELOG) 100 UNIT/ML SOLN injection vial, Inject into the skin 3 times daily (with meals) Sliding scale insulin, Disp: , Rfl:     Wound Cleansers (VASHE WOUND THERAPY) external solution, During wound care, Disp: 475 mL, Rfl: 0    albuterol sulfate HFA (VENTOLIN HFA) 108 (90 Base) MCG/ACT inhaler, Inhale 2 puffs into the lungs 4 times daily as needed for Wheezing, Disp: 18 g, Rfl: 0    insulin glargine (LANTUS SOLOSTAR) 100 UNIT/ML injection pen, Inject 10 Units into the skin nightly, Disp: 3 Adjustable Dose Pre-filled Pen Syringe, Rfl: 3    hydrALAZINE (APRESOLINE) 25 MG tablet, Take 1 tablet by mouth every 8 hours, Disp: 90 tablet, Rfl: 3    Lancets MISC, 1 each by Does not apply route daily, Disp: 100

## 2024-12-14 NOTE — H&P
V2.0  History and Physical      Name:  Yovanny Levine /Age/Sex: 1975  (49 y.o. male)   MRN & CSN:  [id number] & [id number] Encounter Date/Time: 2024 9:26 AM EST   Location:  ED15/ED-15 PCP: José Luis Izquierdo MD       Hospital Day: 1    Assessment and Plan:   Yovanny Levine is a 49 y.o. male who presents with chest pain    Chest pain-check serial troponins.  May be elevated due to ESRD.  EKG shows normal sinus rhythm.  Cardiology consult.  Check echo.  Chest pain may be due to pneumonia.  On aspirin, statin, beta-blocker.  Possible pneumonia-chest x-ray shows right lower lobe opacity.  Patient on Rocephin and azithromycin.  Check respiratory culture.  Check pneumonia panel.  Check urine strep and Legionella.  Check procalcitonin and CRP.  Check respiratory viral panel.  Wean oxygen as tolerated.  Left lower extremity pain-check x-ray of tibia-fibula and check venous duplex.  ESRD-nephrology consult.  Patient receives dialysis on .  Hypertension-on metoprolol.    History of chronic left foot osteomyelitis  CAD  Hyperlipidemia  Chronic pain disorder    Comment: Please note this report has been produced using speech recognition software and may contain errors related to that system including errors in grammar, punctuation, and spelling, as well as words and phrases that may be inappropriate. If there are any questions or concerns please feel free to contact the dictating provider for clarification.      Total Encounter Time: 31 minutes  Home Meds Documented: [x] Yes [] Needs Reconciled    Disposition:   Current Living situation: Home  Expected Disposition: TBD  Estimated D/C:     Diet No diet orders on file   DVT Prophylaxis [] Lovenox, [x]  Heparin, [] SCDs, [] Ambulation,  [] Eliquis, [] Xarelto   Code Status  Had discussion with patient about CPR, shocking, and intubation. Patient is a Full Code.    Surrogate Decision Maker/ POA Son - Yovanny Levine     Personally reviewed  Lab Studies and Imaging       EKG interpreted personally and results normal sinus rhythm        History from:     patient, electronic medical record    History of Present Illness:     Chief Complaint: Chest pain  Yovanny Levine is a 49 y.o. male with pmh of ESRD, CAD, osteomyelitis who presents with chest pain     Patient states around 3-4 a.m. today he had chest pain.  He describes it as a stabbing sensation.  Radiate down his left arm.  It was a 7 out of 10.  Lasted a couple hours until the paramedics gave him nitro.  He has noticed this pain mostly at night.  He has had it a couple times a week.  He states it also occurred a few days earlier.  He states his chest gets a little tight.  He has had stents placed in his heart in the past.  He quit smoking a few years ago.  He does do marijuana.  He has hypertension, diabetes mellitus, and hyperlipidemia.  His father had an MI.  Patient denies any fevers.  He has had some shortness of breath, but no cough.  No sick contacts.  He wears oxygen when sleeping.  He states this chest pain occurred during a sleep clinic evaluation for sleep apnea.  He has had a little bit of swelling.  A little bit of lightheadedness.      He states he finished antibiotics for osteomyelitis last week.  He is also following up with the wound clinic for his amputations.  He has hemodialysis on Monday Wednesday Friday.        Review of Systems:        Pertinent positives and negatives discussed in HPI        Objective:   No intake or output data in the 24 hours ending 12/14/24 0926   Vitals:   Vitals:    12/14/24 0552 12/14/24 0630 12/14/24 0715 12/14/24 0732   BP: (!) 160/99 (!) 167/104 (!) 162/98 (!) 154/95   Pulse: 93 91 87 85   Resp:  17 19    Temp: 98.2 °F (36.8 °C)      TempSrc: Oral      SpO2: 95% 97% 96%        Medications Prior to Admission     Prior to Admission medications    Medication Sig Start Date End Date Taking? Authorizing Provider   oxyCODONE-acetaminophen (PERCOCET) 7.5-325    Psychiatric:         Mood and Affect: Mood normal.         Behavior: Behavior normal.         Thought Content: Thought content normal.         Judgment: Judgment normal.            Past Medical History:   PMHx   Past Medical History:   Diagnosis Date   • Abscess 2010    scrotal   • Acute renal failure (ARF) ([facility]) 08/04/2019   • Anxiety associated with depression    • Anxiety associated with depression    • Back pain 07/02/2012   • CAD (coronary artery disease) 02/10/2023   • Chest pain 05/01/2013   • Diabetic nephropathy ([facility])    • Diabetic neuropathy ([facility]) 12/22/2011   • Diabetic ulcer of left midfoot associated with type 2 diabetes mellitus, with fat layer exposed ([facility]) 07/18/2017   • Diverticulosis     C scope + Dr. Medina   • DM (diabetes mellitus), type 2 ([facility]) 2002    DR. 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 Medina   • Nausea and vomiting 01/11/2019   • Necrotizing fasciitis    • Nose fracture 1988   • Panic attacks    • Pericarditis 2003    Hospitalized with s/p heart cath normal   • Peripheral autonomic neuropathy due to diabetes mellitus ([facility])     Axonal EMG- NCS, March 2011   • Sebaceous cyst 09/01/2011   • URI (upper respiratory infection) 02/27/2012   • 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 type 2 diabetes mellitus, with muscle involvement without evidence of necrosis ([facility]) 09/21/2021   • WD-Wound, surgical, infected, initial encounter 12/08/2017   • Wrist fracture 1986     PSHX:  has a past surgical history that includes Cardiac catheterization (2003, 2013); Tonsillectomy and adenoidectomy (1988); Upper gastrointestinal endoscopy (06/02/2011); Colonoscopy (06/02/2011); Nose surgery (1993); skin biopsy (N/A, 06/18/2013); other surgical history (Right,

## 2024-12-14 NOTE — PROGRESS NOTES
4 Eyes Skin Assessment     NAME:  Yovanny Levine  YOB: 1975  MEDICAL RECORD NUMBER:  [id number]    The patient is being assessed for  Admission    I agree that at least one RN has performed a thorough Head to Toe Skin Assessment on the patient. ALL assessment sites listed below have been assessed.      Areas assessed by both nurses:    Head, Face, Ears, Shoulders, Back, Chest, Arms, Elbows, Hands, Sacrum. Buttock, Coccyx, Ischium, and Legs. Feet and Heels        Does the Patient have a Wound? Yes wound(s) were present on assessment. LDA wound assessment was Initiated and completed by RN       Pradeep Prevention initiated by RN: Yes  Wound Care Orders initiated by RN: Yes    Pressure Injury (Stage 3,4, Unstageable, DTI, NWPT, and Complex wounds) if present, place Wound referral order by RN under : Yes    New Ostomies, if present place, Ostomy referral order under : Yes     Nurse 1 eSignature: Electronically signed by Shelly Atwood RN on 12/14/24 at 1:11 PM EST    **SHARE this note so that the co-signing nurse can place an eSignature**    Nurse 2 eSignature: Electronically signed by Brooke Jaime RN on 12/14/24 at 8:04 PM EST

## 2024-12-14 NOTE — ED NOTES
ED TO INPATIENT SBAR HANDOFF    Patient Name: Yovanny Levine   :  1975  49 y.o.   Preferred Name  Yovanny  Family/Caregiver Present no   Restraints no   C-SSRS: Risk of Suicide: No Risk  Sitter no   Sepsis Risk Score        Situation  Chief Complaint   Patient presents with    Chest Pain     Brief Description of Patient's Condition: Patient arrived via EMS from a sleep study. Patient was having a sleep study done when he started complaining on 10/10 chest pain. Patient is a dialysis patient and last had dialysis yesterday. Patients troponin elevated. Patient being admitted for elevated troponin, chest pain and pneumonia of the right lower long.   Mental Status: oriented and alert  Arrived from: home    Imaging:   XR CHEST PORTABLE   Final Result   1. Right lower lobe opacity likely relating to infiltrate. Continued follow-up   is recommended to ensure resolution.   2. Stable cardiomegaly.         Electronically signed by Abdi Schafer        Abnormal labs:   Abnormal Labs Reviewed   CBC WITH AUTO DIFFERENTIAL - Abnormal; Notable for the following components:       Result Value    RBC 3.84 (*)     Hemoglobin 11.3 (*)     Hematocrit 35.7 (*)     MCHC 31.7 (*)     RDW 17.4 (*)     Platelets 137 (*)     Neutrophils % 77 (*)     Lymphocytes % 7 (*)     Monocytes % 11 (*)     Eosinophils % 4 (*)     All other components within normal limits   COMPREHENSIVE METABOLIC PANEL - Abnormal; Notable for the following components:    Chloride 93 (*)     Glucose 152 (*)     BUN 48 (*)     Creatinine 5.8 (*)     Est, Glom Filt Rate 10 (*)     Calcium 8.2 (*)     All other components within normal limits   TROPONIN - Abnormal; Notable for the following components:    Troponin, High Sensitivity 164 (*)     All other components within normal limits   TROPONIN - Abnormal; Notable for the following components:    Troponin, High Sensitivity 149 (*)     All other components within normal limits   BRAIN NATRIURETIC PEPTIDE - Abnormal;  Notable for the following components:    NT Pro-BNP >70,000 (*)     All other components within normal limits        Background  History:   Past Medical History:   Diagnosis Date    Abscess 2010    scrotal    Acute renal failure (ARF) ([facility]) 08/04/2019    Anxiety associated with depression     Anxiety associated with depression     Back pain 07/02/2012    CAD (coronary artery disease) 02/10/2023    Chest pain 05/01/2013    Diabetic nephropathy (HCC)     Diabetic neuropathy ([facility]) 12/22/2011    Diabetic ulcer of left midfoot associated with type 2 diabetes mellitus, with fat layer exposed ([facility]) 07/18/2017    Diverticulosis     C scope + Dr. Medina    DM (diabetes mellitus), type 2 ([facility]) 2002    DR. 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 Medina    Nausea and vomiting 01/11/2019    Necrotizing fasciitis     Nose fracture 1988    Panic attacks     Pericarditis 2003    Hospitalized with s/p heart cath normal    Peripheral autonomic neuropathy due to diabetes mellitus ([facility])     Axonal EMG- NCS, March 2011    Sebaceous cyst 09/01/2011    URI (upper respiratory infection) 02/27/2012    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 type 2 diabetes mellitus, with muscle involvement without evidence of necrosis ([facility]) 09/21/2021    WD-Wound, surgical, infected, initial encounter 12/08/2017    Wrist fracture 1986       Assessment    Vitals:    Level of Consciousness: Alert (0)   Vitals:    12/14/24 0552 12/14/24 0630 12/14/24 0715 12/14/24 0732   BP: (!) 160/99 (!) 167/104 (!) 162/98 (!) 154/95   Pulse: 93 91 87 85   Resp:  17 19    Temp: 98.2 °F (36.8 °C)      TempSrc: Oral      SpO2: 95% 97% 96%        PO Status: Regular    O2 Flow Rate: O2 Device: Nasal cannula O2 Flow Rate (L/min): 2

## 2024-12-15 ENCOUNTER — APPOINTMENT (OUTPATIENT)
Dept: GENERAL RADIOLOGY | Age: 49
DRG: 321 | End: 2024-12-15
Attending: HOSPITALIST
Payer: MEDICARE

## 2024-12-15 LAB
ACINETOBACTER CALCOACETICUS-BAUMANNII BY PCR: NOT DETECTED
ADENOVIRUS: NOT DETECTED
ANION GAP SERPL CALCULATED.3IONS-SCNC: 20 MMOL/L (ref 9–17)
BUN SERPL-MCNC: 67 MG/DL (ref 7–20)
CALCIUM SERPL-MCNC: 8 MG/DL (ref 8.3–10.6)
CHLAMYDIA PNEUMONIAE BY PCR: NOT DETECTED
CHLORIDE SERPL-SCNC: 94 MMOL/L (ref 99–110)
CO2 SERPL-SCNC: 24 MMOL/L (ref 21–32)
CORONAVIRUS PCR: NOT DETECTED
CREAT SERPL-MCNC: 7.4 MG/DL (ref 0.9–1.3)
ENTEROBACTER CLOACAE COMPLEX BY PCR: NOT DETECTED
ERYTHROCYTE [DISTWIDTH] IN BLOOD BY AUTOMATED COUNT: 17.2 % (ref 11.7–14.9)
ESCHERICHIA COLI BY PCR: NOT DETECTED
GFR, ESTIMATED: 8 ML/MIN/1.73M2
GLUCOSE BLD-MCNC: 141 MG/DL (ref 74–99)
GLUCOSE BLD-MCNC: 147 MG/DL (ref 74–99)
GLUCOSE BLD-MCNC: 174 MG/DL (ref 74–99)
GLUCOSE SERPL-MCNC: 155 MG/DL (ref 74–99)
GP B STREP DNA SPT NAA+NON-PRB-NCNCRNG: NOT DETECTED
HAEMOPHILUS INFLUENZAE BY PCR: NOT DETECTED
HCT VFR BLD AUTO: 33.8 % (ref 42–52)
HGB BLD-MCNC: 10.6 G/DL (ref 13.5–18)
HUMAN RHINOVIRUS/ENTEROVIRUS BY PCR: NOT DETECTED
INFLUENZA A BY PCR: NOT DETECTED
INFLUENZA B BY PCR: NOT DETECTED
K AEROGENES DNA BAL NAA+NON-PRB-NCNCRNG: NOT DETECTED
K OXYTOCA DNA SPT NAA+NON-PRB-NCNCRNG: NOT DETECTED
K PNEU GRP DNA SPT NAA+NON-PRB-NCNCRNG: NOT DETECTED
L PNEUMO1 AG UR QL IA.RAPID: NEGATIVE
LEGIONELLA PNEUMOPHILIA BY PCR: NOT DETECTED
M CATARRHALIS DNA BAL NAA+NON-PRB-NCNCRNG: NOT DETECTED
MCH RBC QN AUTO: 30.1 PG (ref 27–31)
MCHC RBC AUTO-ENTMCNC: 31.4 G/DL (ref 32–36)
MCV RBC AUTO: 96 FL (ref 78–100)
METAPNEUMOVIRUS BY PCR: NOT DETECTED
MYCOPLASMA PNEUMONIAE PCR: NOT DETECTED
P AERUGINOSA DNA SPT NAA+NON-PRB-NCNCRNG: NOT DETECTED
PARAINFLUENZA VIRUS BY PCR: NOT DETECTED
PLATELET # BLD AUTO: 114 K/UL (ref 140–440)
PMV BLD AUTO: 9.8 FL (ref 7.5–11.1)
POTASSIUM SERPL-SCNC: 4 MMOL/L (ref 3.5–5.1)
PROTEUS SP DNA BAL NAA+NON-PRB-NCNCRNG: NOT DETECTED
RBC # BLD AUTO: 3.52 M/UL (ref 4.6–6.2)
RSV BY PCR: NOT DETECTED
S AUREUS DNA SPT NAA+NON-PRB-NCNCRNG: NOT DETECTED
S MARCESCENS DNA SPT NAA+NON-PRB-NCNCRNG: NOT DETECTED
S PNEUM AG SPEC QL: NEGATIVE
S PNEUM DNA BAL NAA+NON-PRB-NCNCRNG: NOT DETECTED
S PYO DNA SPT NAA+NON-PRB-NCNCRNG: NOT DETECTED
SODIUM SERPL-SCNC: 138 MMOL/L (ref 136–145)
SOURCE: NORMAL
SPECIMEN SOURCE: NORMAL
WBC OTHER # BLD: 5.8 K/UL (ref 4–10.5)

## 2024-12-15 PROCEDURE — 94640 AIRWAY INHALATION TREATMENT: CPT

## 2024-12-15 PROCEDURE — 94761 N-INVAS EAR/PLS OXIMETRY MLT: CPT

## 2024-12-15 PROCEDURE — G0378 HOSPITAL OBSERVATION PER HR: HCPCS

## 2024-12-15 PROCEDURE — 96372 THER/PROPH/DIAG INJ SC/IM: CPT

## 2024-12-15 PROCEDURE — 2700000000 HC OXYGEN THERAPY PER DAY

## 2024-12-15 PROCEDURE — 6370000000 HC RX 637 (ALT 250 FOR IP): Performed by: INTERNAL MEDICINE

## 2024-12-15 PROCEDURE — 2580000003 HC RX 258: Performed by: HOSPITALIST

## 2024-12-15 PROCEDURE — 80048 BASIC METABOLIC PNL TOTAL CA: CPT

## 2024-12-15 PROCEDURE — 6370000000 HC RX 637 (ALT 250 FOR IP): Performed by: NURSE PRACTITIONER

## 2024-12-15 PROCEDURE — 6370000000 HC RX 637 (ALT 250 FOR IP): Performed by: HOSPITALIST

## 2024-12-15 PROCEDURE — 99233 SBSQ HOSP IP/OBS HIGH 50: CPT | Performed by: INTERNAL MEDICINE

## 2024-12-15 PROCEDURE — 6360000002 HC RX W HCPCS: Performed by: HOSPITALIST

## 2024-12-15 PROCEDURE — 36415 COLL VENOUS BLD VENIPUNCTURE: CPT

## 2024-12-15 PROCEDURE — 71046 X-RAY EXAM CHEST 2 VIEWS: CPT

## 2024-12-15 PROCEDURE — 82962 GLUCOSE BLOOD TEST: CPT

## 2024-12-15 PROCEDURE — 96376 TX/PRO/DX INJ SAME DRUG ADON: CPT

## 2024-12-15 PROCEDURE — 96366 THER/PROPH/DIAG IV INF ADDON: CPT

## 2024-12-15 PROCEDURE — 85027 COMPLETE CBC AUTOMATED: CPT

## 2024-12-15 PROCEDURE — APPSS60 APP SPLIT SHARED TIME 46-60 MINUTES: Performed by: NURSE PRACTITIONER

## 2024-12-15 RX ORDER — HYDRALAZINE HYDROCHLORIDE 25 MG/1
25 TABLET, FILM COATED ORAL EVERY 8 HOURS SCHEDULED
Status: DISCONTINUED | OUTPATIENT
Start: 2024-12-15 | End: 2024-12-19 | Stop reason: HOSPADM

## 2024-12-15 RX ORDER — CALCIUM CARBONATE 500 MG/1
2 TABLET, CHEWABLE ORAL
Status: DISCONTINUED | OUTPATIENT
Start: 2024-12-15 | End: 2024-12-19 | Stop reason: HOSPADM

## 2024-12-15 RX ORDER — CALCITRIOL 0.25 UG/1
0.5 CAPSULE, LIQUID FILLED ORAL 2 TIMES DAILY
Status: DISCONTINUED | OUTPATIENT
Start: 2024-12-15 | End: 2024-12-19 | Stop reason: HOSPADM

## 2024-12-15 RX ADMIN — INSULIN GLARGINE 10 UNITS: 100 INJECTION, SOLUTION SUBCUTANEOUS at 20:01

## 2024-12-15 RX ADMIN — ALBUTEROL SULFATE 2.5 MG: 2.5 SOLUTION RESPIRATORY (INHALATION) at 10:56

## 2024-12-15 RX ADMIN — HEPARIN SODIUM 5000 UNITS: 5000 INJECTION INTRAVENOUS; SUBCUTANEOUS at 14:15

## 2024-12-15 RX ADMIN — ALBUTEROL SULFATE 2.5 MG: 2.5 SOLUTION RESPIRATORY (INHALATION) at 06:58

## 2024-12-15 RX ADMIN — CALCIUM CARBONATE 1000 MG: 500 TABLET, CHEWABLE ORAL at 14:10

## 2024-12-15 RX ADMIN — HEPARIN SODIUM 5000 UNITS: 5000 INJECTION INTRAVENOUS; SUBCUTANEOUS at 06:20

## 2024-12-15 RX ADMIN — METOPROLOL TARTRATE 12.5 MG: 25 TABLET, FILM COATED ORAL at 09:04

## 2024-12-15 RX ADMIN — CEFTRIAXONE 1000 MG: 1 INJECTION, POWDER, FOR SOLUTION INTRAMUSCULAR; INTRAVENOUS at 06:20

## 2024-12-15 RX ADMIN — ONDANSETRON 4 MG: 2 INJECTION INTRAMUSCULAR; INTRAVENOUS at 16:50

## 2024-12-15 RX ADMIN — CALCIUM CARBONATE 1000 MG: 500 TABLET, CHEWABLE ORAL at 09:04

## 2024-12-15 RX ADMIN — METOPROLOL TARTRATE 12.5 MG: 25 TABLET, FILM COATED ORAL at 20:01

## 2024-12-15 RX ADMIN — ALBUTEROL SULFATE 2.5 MG: 2.5 SOLUTION RESPIRATORY (INHALATION) at 19:08

## 2024-12-15 RX ADMIN — CALCITRIOL 0.5 MCG: 0.5 CAPSULE, LIQUID FILLED ORAL at 14:10

## 2024-12-15 RX ADMIN — AZITHROMYCIN MONOHYDRATE 500 MG: 500 INJECTION, POWDER, LYOPHILIZED, FOR SOLUTION INTRAVENOUS at 14:08

## 2024-12-15 RX ADMIN — CALCITRIOL 0.5 MCG: 0.5 CAPSULE, LIQUID FILLED ORAL at 20:03

## 2024-12-15 RX ADMIN — HEPARIN SODIUM 5000 UNITS: 5000 INJECTION INTRAVENOUS; SUBCUTANEOUS at 20:01

## 2024-12-15 RX ADMIN — OXYCODONE AND ACETAMINOPHEN 1 TABLET: 7.5; 325 TABLET ORAL at 13:20

## 2024-12-15 RX ADMIN — ASPIRIN 81 MG: 81 TABLET, CHEWABLE ORAL at 09:04

## 2024-12-15 RX ADMIN — OXYCODONE AND ACETAMINOPHEN 1 TABLET: 7.5; 325 TABLET ORAL at 03:40

## 2024-12-15 RX ADMIN — SODIUM CHLORIDE, PRESERVATIVE FREE 10 ML: 5 INJECTION INTRAVENOUS at 14:11

## 2024-12-15 RX ADMIN — ATORVASTATIN CALCIUM 40 MG: 40 TABLET, FILM COATED ORAL at 20:01

## 2024-12-15 RX ADMIN — HYDRALAZINE HYDROCHLORIDE 25 MG: 25 TABLET ORAL at 14:14

## 2024-12-15 RX ADMIN — SODIUM CHLORIDE, PRESERVATIVE FREE 10 ML: 5 INJECTION INTRAVENOUS at 20:02

## 2024-12-15 RX ADMIN — ALBUTEROL SULFATE 2.5 MG: 2.5 SOLUTION RESPIRATORY (INHALATION) at 15:41

## 2024-12-15 RX ADMIN — HYDRALAZINE HYDROCHLORIDE 25 MG: 25 TABLET ORAL at 22:42

## 2024-12-15 ASSESSMENT — PAIN DESCRIPTION - ORIENTATION
ORIENTATION: LEFT

## 2024-12-15 ASSESSMENT — PAIN DESCRIPTION - LOCATION
LOCATION: LEG
LOCATION: FOOT
LOCATION: FOOT

## 2024-12-15 ASSESSMENT — PAIN - FUNCTIONAL ASSESSMENT: PAIN_FUNCTIONAL_ASSESSMENT: ACTIVITIES ARE NOT PREVENTED

## 2024-12-15 ASSESSMENT — PAIN DESCRIPTION - DESCRIPTORS
DESCRIPTORS: DISCOMFORT;THROBBING
DESCRIPTORS: THROBBING
DESCRIPTORS: ACHING;SHARP

## 2024-12-15 ASSESSMENT — PAIN SCALES - GENERAL
PAINLEVEL_OUTOF10: 7
PAINLEVEL_OUTOF10: 6
PAINLEVEL_OUTOF10: 7
PAINLEVEL_OUTOF10: 8

## 2024-12-15 ASSESSMENT — PAIN SCALES - WONG BAKER: WONGBAKER_NUMERICALRESPONSE: NO HURT

## 2024-12-15 NOTE — PROGRESS NOTES
Cardiology Progress Note      Today's Plan: npo after midnight for stress test in am.     Admit Date:  12/14/2024    Consult reason/ Seen today for: CP    Subjective and  Overnight Events:  Denies any chest pain, SOB, or palpations.         CARDIOLOGY ATTENDING ADDENDUM    I have seen, spoken to and examined this patient personally, independent of the NP/PAC. I have reviewed the hospital care given to date and reviewed all pertinent labs and imaging. I have spoken with patient, nursing staff and provided written and verbal instructions .The above note has been reviewed. I have spent substantive (>50%) amount of time in formulating patient care.         Physical Exam:       Head: normal  Eye: normal  Chest:   Clear,  0 Basilar crackles   Cardiovascular:  S1S2   Abdomen: soft          MEDICAL DECISION MAKING :        CP:  stress test and echo on Monday.  HTN: lopressor 12.5 mg. On HD, start hydralazine 25 mg TID.   CAD: PCI to LAD(9/30/21) and LCX (8/24/22). Continue with ASA, BB, and statin.   Left foot osteomyelitis: Primary team follow-up  HLD: continue statins  ESRD: on HD.          Dr. TARUN Gomez MD      +++++++++++++++++++++++++++++++++++++++        Review of Systems:  Review of Systems   Respiratory:  Negative for shortness of breath.    Cardiovascular:  Negative for chest pain, palpitations and leg swelling.   Musculoskeletal: Negative.    Skin: Negative.    Neurological:  Negative for dizziness and weakness.   All other systems reviewed and are negative.       BP (!) 157/94   Pulse 66   Temp 97.5 °F (36.4 °C) (Oral)   Resp 18   Ht 1.753 m (5' 9\")   Wt 114.5 kg (252 lb 6.8 oz)   SpO2 97%   BMI 37.28 kg/m²     Intake/Output Summary (Last 24 hours) at 12/15/2024 1337  Last data filed at 12/15/2024 0845  Gross per 24 hour   Intake 360 ml   Output --   Net 360 ml       Physical Exam:  Physical Exam  Constitutional:       Appearance: He is well-developed.   Cardiovascular:      Rate and Rhythm: Normal

## 2024-12-15 NOTE — PROGRESS NOTES
V2.0  [facility] Hospitalist Progress Note      Name:  Yovanny Levine /Age/Sex: 1975  (49 y.o. male)   MRN & CSN:  [id number] & [id number] Encounter Date/Time: 12/15/2024 7:38 AM EST    Location:  OBS  PCP: José Luis Izquierdo MD       Hospital Day: 2    Assessment and Plan:   Yovanny Levine is a 49 y.o. male who presents with Chest pain    Chest pain  Elevated troponin   - presented with acute on chronic left-sided chest pain   - trop 164, 149,164 -chronically elevated.  Chart reviewed, likely due to ESRD  -Cardiology consulted, planning for echo and stress test in a.m.  -Chest pain improved this a.m.  -Continue aspirin, statin, beta-blocker    Possible pneumonia  -chest x-ray shows right lower lobe opacity. Had recent admission with R-sided pleural effusion s/p thoracentesis    - strep/legionella antigens negative   - afebrile, no leukocytosis  - respiratory panel and PNA panel negative  - procal elevated, but may be due to renal disease  - On rocephin and azithromycin  - 2-view CXR pending. Likely de-escalate abx soon     Left lower extremity pain  - venous duple negative for DVT  - arterial duplex pending  - x-ray of tibia-fibula pending   - denied pain this AM    Chronic respiratory failure with hypoxia   -wears 3L NC at night    ESRD  -Patient receives dialysis on .  - nephrology following     Hypertension  -on metoprolol, hydralazine    T2DM  -Continue Lantus with sliding scale  -Monitor for hypoglycemia    This patient was discussed with Dr. Tinoco. He was agreeable with the assessment and plan as dictated above.     Current Living situation: home  Expected Disposition: same  Estimated D/C: 24 hours    Diet ADULT DIET; Regular; 4 carb choices (60 gm/meal); Low Fat/Low Chol/High Fiber/ERLIN; No Caffeine   DVT Prophylaxis [] Lovenox, [x]  Heparin, [] SCDs, [] Ambulation,  [] Eliquis, [] Xarelto []Coumadin   Code Status Full Code   Disposition Patient requires continued  CORONAVIRUS PCR Not Detected Not Detected    Enterobacter cloacae complex by PCR Not Detected Not Detected    Escherichia coli by PCR Not Detected Not Detected    Influenza A by PCR Not Detected Not Detected    Influenza B by PCR Not Detected Not Detected    Haemophilus Influenzae by PCR Not Detected Not Detected    Metapneumovirus by PCR Not Detected Not Detected    Klebsiella aerogenes by PCR Not Detected Not Detected    Klebsiella oxytoca by PCR Not Detected Not Detected    Klebsiella pneumoniae group by PCR Not Detected Not Detected    Legionella pneumophilia by PCR Not Detected Not Detected    Moraxella catarrhalis by PCR Not Detected Not Detected    Mycoplasma pneumo by PCR Not Detected Not Detected    Pseudomonas aeruginosa by PCR Not Detected Not Detected    Parainfluenza virus by PCR Not Detected Not Detected    Proteus species by PCR Not Detected Not Detected    Human Rhinovirus/Enterovirus by PCR Not Detected Not Detected    RSV by PCR Not Detected Not Detected    Strep agalactiae by PCR Not Detected Not Detected    Staph aureus by PCR Not Detected Not Detected    Serratia marcescens by PCR Not Detected Not Detected    Streptococcus pneumoniae by PCR Not Detected Not Detected    Streptococcus pyogenes  by PCR Not Detected Not Detected   POCT Glucose    Collection Time: 12/14/24  5:00 PM   Result Value Ref Range    POC Glucose 142 (H) 74 - 99 mg/dL   POCT Glucose    Collection Time: 12/14/24  8:13 PM   Result Value Ref Range    POC Glucose 191 (H) 74 - 99 mg/dL   Basic Metabolic Panel w/ Reflex to MG    Collection Time: 12/15/24  4:43 AM   Result Value Ref Range    Sodium 138 136 - 145 mmol/L    Potassium 4.0 3.5 - 5.1 mmol/L    Chloride 94 (L) 99 - 110 mmol/L    CO2 24 21 - 32 mmol/L    Anion Gap 20 (H) 9 - 17 mmol/L    Glucose 155 (H) 74 - 99 mg/dL    BUN 67 (H) 7 - 20 mg/dL    Creatinine 7.4 (H) 0.9 - 1.3 mg/dL    Est, Glom Filt Rate 8 (L) >60 mL/min/1.73m2    Calcium 8.0 (L) 8.3 - 10.6 mg/dL   CBC

## 2024-12-15 NOTE — CONSULTS
Nephrology Service Consultation      [address], [address]  [place] [address]  Phone: [phone]  Office Hours: 8:30AM - 4:30PM  Monday - Friday        MEDICAL DECISION MAKING and Recommendations   -RLL pneumonia  -Chest pain and hx of CAD  -ESRD on HD MWF  -Hypocalcemia  -CKD-MBD  -Anemia of ESRD  -DM2  -Hx diabetic foot ulcer: seems to have healed now    Suggest:  -Increase calcium carbonate and resume calcitriol  -HD planned MWF while inpatient  -Resume home doses of antihypertensives and adjust doses as needed  -Retacrit for hgb<10  -Abx for pna  -Left AVF so keep that arm free of IV access, BP cuffs, etc    Thank you        Patient Active Problem List    Diagnosis Date Noted    Volume overload 02/22/2023    Acute on chronic respiratory failure with hypoxia 02/10/2023    CAD (coronary artery disease) 02/10/2023    Problem with vascular access 11/26/2022    ESRD (end stage renal disease) ([facility]) 09/12/2022    Diabetic foot ulcer with osteomyelitis ([facility]) 09/01/2022    NSTEMI (non-ST elevated myocardial infarction) ([facility]) 08/24/2022    Chest tightness 08/22/2022    CARMEN (acute kidney injury) ([facility]) 07/25/2022    Anemia 07/25/2022    Recurrent major depressive disorder, in full remission ([facility]) 05/18/2021    Generalized anxiety disorder 05/18/2021    Diabetic ulcer of left midfoot Dave 3 associated with type 2 diabetes mellitus, with fat layer exposed ([facility]) 11/19/2024    Type 2 diabetes mellitus with skin complication, with long-term current use of insulin ([facility]) 11/19/2024    Acute hypoxic respiratory failure 11/11/2024    Chronic refractory osteomyelitis of foot, left 09/06/2024    Pain in left lower leg 08/27/2024    Acute pain of left knee 08/20/2024    Diabetic ulcer of midfoot Dave 3 associated with type 2 diabetes mellitus, with muscle involvement without evidence of necrosis ([facility]) 05/31/2024    ESRD (end stage renal disease) on dialysis ([facility]) 04/25/2024    Hyperkalemia 04/08/2024             ?site    Stroke Mother     Bleeding Prob Mother     Diabetes Father     Heart Disease Father     High Blood Pressure Father     Obesity Father     Kidney Disease Father     Diabetes Sister     High Blood Pressure Sister     Mental Illness Sister     Obesity Sister     High Blood Pressure Other     Mental Illness Other         bipolar    Other Son         cyst in ear canal    ADHD Daughter      Physical Exam:    Vitals: BP (!) 167/97   Pulse 67   Temp 97.4 °F (36.3 °C) (Oral)   Resp 16   Ht 1.753 m (5' 9\")   Wt 114.5 kg (252 lb 6.8 oz)   SpO2 99%   BMI 37.28 kg/m²   General appearance: in no acute distress, appears stated age  Skin: Skin color, texture, turgor normal. No rashes or lesions  HEENT: normocephalic, atraumatic  Neck: supple, trachea midline  Lungs: clear to auscultation bilaterally, breathing comfortably  Heart:: regular rate and rhythm, S1, S2 normal,  Abdomen: soft, non-tender; bowel sounds normal; no masses,   Extremities: extremities normal, atraumatic, no cyanosis or edema  Neurologic: Mental status: alert, oriented, interactive, following commands  Psychiatric: mood and affect appropriate     CBC:   Recent Labs     12/14/24  0553 12/15/24  0443   WBC 6.9 5.8   HGB 11.3* 10.6*   * 114*     BMP:    Recent Labs     12/14/24  0553 12/15/24  0443    138   K 4.0 4.0   CL 93* 94*   CO2 29 24   BUN 48* 67*   CREATININE 5.8* 7.4*   GLUCOSE 152* 155*     Hepatic:   Recent Labs     12/14/24  0553   AST 23   ALT 13   BILITOT 0.7   ALKPHOS 80     Troponin: No results for input(s): \"TROPONINI\" in the last 72 hours.  BNP: No results for input(s): \"BNP\" in the last 72 hours.  No intake/output data recorded.         Electronically signed by Melody James DO on 12/15/2024 at 7:21 AM    [facility]  MD SAMANTHA SMITH DO  [address],  [address]  [place] [address]  PHONE: [phone]  FAX: [phone]

## 2024-12-16 ENCOUNTER — APPOINTMENT (OUTPATIENT)
Dept: NUCLEAR MEDICINE | Age: 49
DRG: 321 | End: 2024-12-16
Payer: MEDICARE

## 2024-12-16 ENCOUNTER — APPOINTMENT (OUTPATIENT)
Dept: NON INVASIVE DIAGNOSTICS | Age: 49
DRG: 321 | End: 2024-12-16
Payer: MEDICARE

## 2024-12-16 ENCOUNTER — APPOINTMENT (OUTPATIENT)
Dept: NON INVASIVE DIAGNOSTICS | Age: 49
DRG: 321 | End: 2024-12-16
Attending: INTERNAL MEDICINE
Payer: MEDICARE

## 2024-12-16 PROBLEM — J96.20 ACUTE ON CHRONIC RESPIRATORY FAILURE: Status: ACTIVE | Noted: 2024-12-16

## 2024-12-16 LAB
ECHO AO ROOT DIAM: 3 CM
ECHO AO ROOT INDEX: 1.28 CM/M2
ECHO AV AREA PEAK VELOCITY: 2.3 CM2
ECHO AV AREA VTI: 2.2 CM2
ECHO AV AREA/BSA PEAK VELOCITY: 1 CM2/M2
ECHO AV AREA/BSA VTI: 0.9 CM2/M2
ECHO AV MEAN GRADIENT: 3 MMHG
ECHO AV MEAN VELOCITY: 0.8 M/S
ECHO AV PEAK GRADIENT: 5 MMHG
ECHO AV PEAK VELOCITY: 1.2 M/S
ECHO AV VELOCITY RATIO: 0.67
ECHO AV VTI: 25.3 CM
ECHO BSA: 2.43 M2
ECHO EST RA PRESSURE: 3 MMHG
ECHO IVC PROX: 2.8 CM
ECHO LA AREA 4C: 23.4 CM2
ECHO LA DIAMETER INDEX: 1.92 CM/M2
ECHO LA DIAMETER: 4.5 CM
ECHO LA MAJOR AXIS: 6.8 CM
ECHO LA TO AORTIC ROOT RATIO: 1.5
ECHO LA VOL MOD A4C: 71 ML (ref 18–58)
ECHO LA VOLUME INDEX MOD A4C: 30 ML/M2 (ref 16–34)
ECHO LV E' LATERAL VELOCITY: 9 CM/S
ECHO LV E' SEPTAL VELOCITY: 6.1 CM/S
ECHO LV EF PHYSICIAN: 50 %
ECHO LV FRACTIONAL SHORTENING: 19 % (ref 28–44)
ECHO LV INTERNAL DIMENSION DIASTOLE INDEX: 1.84 CM/M2
ECHO LV INTERNAL DIMENSION DIASTOLIC: 4.3 CM (ref 4.2–5.9)
ECHO LV INTERNAL DIMENSION SYSTOLIC INDEX: 1.5 CM/M2
ECHO LV INTERNAL DIMENSION SYSTOLIC: 3.5 CM
ECHO LV IVSD: 1.5 CM (ref 0.6–1)
ECHO LV MASS 2D: 245 G (ref 88–224)
ECHO LV MASS INDEX 2D: 104.7 G/M2 (ref 49–115)
ECHO LV POSTERIOR WALL DIASTOLIC: 1.4 CM (ref 0.6–1)
ECHO LV RELATIVE WALL THICKNESS RATIO: 0.65
ECHO LVOT AREA: 3.5 CM2
ECHO LVOT AV VTI INDEX: 0.65
ECHO LVOT DIAM: 2.1 CM
ECHO LVOT MEAN GRADIENT: 1 MMHG
ECHO LVOT PEAK GRADIENT: 2 MMHG
ECHO LVOT PEAK VELOCITY: 0.8 M/S
ECHO LVOT STROKE VOLUME INDEX: 24.3 ML/M2
ECHO LVOT SV: 56.8 ML
ECHO LVOT VTI: 16.4 CM
ECHO MV A VELOCITY: 0.3 M/S
ECHO MV E DECELERATION TIME (DT): 169 MS
ECHO MV E VELOCITY: 1.08 M/S
ECHO MV E/A RATIO: 3.6
ECHO MV E/E' LATERAL: 12
ECHO MV E/E' RATIO (AVERAGED): 14.85
ECHO MV E/E' SEPTAL: 17.7
ECHO RIGHT VENTRICULAR SYSTOLIC PRESSURE (RVSP): 36 MMHG
ECHO TV REGURGITANT MAX VELOCITY: 2.87 M/S
ECHO TV REGURGITANT PEAK GRADIENT: 33 MMHG
EKG ATRIAL RATE: 84 BPM
EKG DIAGNOSIS: NORMAL
EKG P AXIS: 3 DEGREES
EKG P-R INTERVAL: 164 MS
EKG Q-T INTERVAL: 432 MS
EKG QRS DURATION: 96 MS
EKG QTC CALCULATION (BAZETT): 510 MS
EKG R AXIS: 46 DEGREES
EKG T AXIS: 85 DEGREES
EKG VENTRICULAR RATE: 84 BPM
GLUCOSE BLD-MCNC: 102 MG/DL (ref 74–99)
GLUCOSE BLD-MCNC: 120 MG/DL (ref 74–99)
GLUCOSE BLD-MCNC: 127 MG/DL (ref 74–99)
GLUCOSE BLD-MCNC: 145 MG/DL (ref 74–99)
MICROORGANISM SPEC CULT: ABNORMAL
MICROORGANISM/AGENT SPEC: ABNORMAL
NUC STRESS EJECTION FRACTION: 28 %
SPECIMEN DESCRIPTION: ABNORMAL
STRESS BASELINE DIAS BP: 76 MMHG
STRESS BASELINE HR: 60 BPM
STRESS BASELINE SYS BP: 119 MMHG
STRESS ESTIMATED WORKLOAD: 1 METS
STRESS PEAK DIAS BP: 84 MMHG
STRESS PEAK SYS BP: 126 MMHG
STRESS PERCENT HR ACHIEVED: 38 %
STRESS POST PEAK HR: 65 BPM
STRESS RATE PRESSURE PRODUCT: 8190 BPM*MMHG
STRESS TARGET HR: 171 BPM

## 2024-12-16 PROCEDURE — 94640 AIRWAY INHALATION TREATMENT: CPT

## 2024-12-16 PROCEDURE — 89220 SPUTUM SPECIMEN COLLECTION: CPT

## 2024-12-16 PROCEDURE — 2580000003 HC RX 258: Performed by: INTERNAL MEDICINE

## 2024-12-16 PROCEDURE — 90935 HEMODIALYSIS ONE EVALUATION: CPT

## 2024-12-16 PROCEDURE — 93016 CV STRESS TEST SUPVJ ONLY: CPT | Performed by: INTERNAL MEDICINE

## 2024-12-16 PROCEDURE — 96366 THER/PROPH/DIAG IV INF ADDON: CPT

## 2024-12-16 PROCEDURE — C1725 CATH, TRANSLUMIN NON-LASER: HCPCS | Performed by: INTERNAL MEDICINE

## 2024-12-16 PROCEDURE — 82962 GLUCOSE BLOOD TEST: CPT

## 2024-12-16 PROCEDURE — 2580000003 HC RX 258: Performed by: HOSPITALIST

## 2024-12-16 PROCEDURE — 6370000000 HC RX 637 (ALT 250 FOR IP): Performed by: HOSPITALIST

## 2024-12-16 PROCEDURE — C9600 PERC DRUG-EL COR STENT SING: HCPCS | Performed by: INTERNAL MEDICINE

## 2024-12-16 PROCEDURE — 93571 IV DOP VEL&/PRESS C FLO 1ST: CPT | Performed by: INTERNAL MEDICINE

## 2024-12-16 PROCEDURE — 027034Z DILATION OF CORONARY ARTERY, ONE ARTERY WITH DRUG-ELUTING INTRALUMINAL DEVICE, PERCUTANEOUS APPROACH: ICD-10-PCS | Performed by: INTERNAL MEDICINE

## 2024-12-16 PROCEDURE — 6360000002 HC RX W HCPCS: Performed by: INTERNAL MEDICINE

## 2024-12-16 PROCEDURE — 96372 THER/PROPH/DIAG INJ SC/IM: CPT

## 2024-12-16 PROCEDURE — 3430000000 HC RX DIAGNOSTIC RADIOPHARMACEUTICAL: Performed by: INTERNAL MEDICINE

## 2024-12-16 PROCEDURE — 96376 TX/PRO/DX INJ SAME DRUG ADON: CPT

## 2024-12-16 PROCEDURE — 6360000002 HC RX W HCPCS: Performed by: HOSPITALIST

## 2024-12-16 PROCEDURE — 93017 CV STRESS TEST TRACING ONLY: CPT

## 2024-12-16 PROCEDURE — 6360000004 HC RX CONTRAST MEDICATION: Performed by: INTERNAL MEDICINE

## 2024-12-16 PROCEDURE — 2060000000 HC ICU INTERMEDIATE R&B

## 2024-12-16 PROCEDURE — 93306 TTE W/DOPPLER COMPLETE: CPT

## 2024-12-16 PROCEDURE — 93010 ELECTROCARDIOGRAM REPORT: CPT | Performed by: INTERNAL MEDICINE

## 2024-12-16 PROCEDURE — 4A023N7 MEASUREMENT OF CARDIAC SAMPLING AND PRESSURE, LEFT HEART, PERCUTANEOUS APPROACH: ICD-10-PCS | Performed by: INTERNAL MEDICINE

## 2024-12-16 PROCEDURE — 93018 CV STRESS TEST I&R ONLY: CPT | Performed by: INTERNAL MEDICINE

## 2024-12-16 PROCEDURE — B2151ZZ FLUOROSCOPY OF LEFT HEART USING LOW OSMOLAR CONTRAST: ICD-10-PCS | Performed by: INTERNAL MEDICINE

## 2024-12-16 PROCEDURE — 94664 DEMO&/EVAL PT USE INHALER: CPT

## 2024-12-16 PROCEDURE — 93572 IV DOP VEL&/PRESS C FLO EA: CPT | Performed by: INTERNAL MEDICINE

## 2024-12-16 PROCEDURE — 92920 PRQ TRLUML C ANGIOP 1ART&/BR: CPT | Performed by: INTERNAL MEDICINE

## 2024-12-16 PROCEDURE — 92928 PRQ TCAT PLMT NTRAC ST 1 LES: CPT | Performed by: INTERNAL MEDICINE

## 2024-12-16 PROCEDURE — 92921 HC PRQ CARDIAC ANGIO ADDL ART: CPT | Performed by: INTERNAL MEDICINE

## 2024-12-16 PROCEDURE — APPNB15 APP NON BILLABLE TIME 0-15 MINS

## 2024-12-16 PROCEDURE — C1887 CATHETER, GUIDING: HCPCS | Performed by: INTERNAL MEDICINE

## 2024-12-16 PROCEDURE — 78452 HT MUSCLE IMAGE SPECT MULT: CPT | Performed by: INTERNAL MEDICINE

## 2024-12-16 PROCEDURE — 2700000000 HC OXYGEN THERAPY PER DAY

## 2024-12-16 PROCEDURE — 78452 HT MUSCLE IMAGE SPECT MULT: CPT

## 2024-12-16 PROCEDURE — 93458 L HRT ARTERY/VENTRICLE ANGIO: CPT | Performed by: INTERNAL MEDICINE

## 2024-12-16 PROCEDURE — 6370000000 HC RX 637 (ALT 250 FOR IP): Performed by: INTERNAL MEDICINE

## 2024-12-16 PROCEDURE — C1769 GUIDE WIRE: HCPCS | Performed by: INTERNAL MEDICINE

## 2024-12-16 PROCEDURE — B2111ZZ FLUOROSCOPY OF MULTIPLE CORONARY ARTERIES USING LOW OSMOLAR CONTRAST: ICD-10-PCS | Performed by: INTERNAL MEDICINE

## 2024-12-16 PROCEDURE — 85347 COAGULATION TIME ACTIVATED: CPT

## 2024-12-16 PROCEDURE — C9601 PERC DRUG-EL COR STENT BRAN: HCPCS | Performed by: INTERNAL MEDICINE

## 2024-12-16 PROCEDURE — 4A033BC MEASUREMENT OF ARTERIAL PRESSURE, CORONARY, PERCUTANEOUS APPROACH: ICD-10-PCS | Performed by: INTERNAL MEDICINE

## 2024-12-16 PROCEDURE — 93306 TTE W/DOPPLER COMPLETE: CPT | Performed by: INTERNAL MEDICINE

## 2024-12-16 PROCEDURE — C1874 STENT, COATED/COV W/DEL SYS: HCPCS | Performed by: INTERNAL MEDICINE

## 2024-12-16 PROCEDURE — 92929 PR PRQ TRLUML CORONARY STENT W/ANGIO ADDL ART/BRNCH: CPT | Performed by: INTERNAL MEDICINE

## 2024-12-16 PROCEDURE — 2709999900 HC NON-CHARGEABLE SUPPLY: Performed by: INTERNAL MEDICINE

## 2024-12-16 PROCEDURE — C1894 INTRO/SHEATH, NON-LASER: HCPCS | Performed by: INTERNAL MEDICINE

## 2024-12-16 PROCEDURE — 99233 SBSQ HOSP IP/OBS HIGH 50: CPT | Performed by: INTERNAL MEDICINE

## 2024-12-16 PROCEDURE — 6370000000 HC RX 637 (ALT 250 FOR IP): Performed by: NURSE PRACTITIONER

## 2024-12-16 PROCEDURE — 5A1D70Z PERFORMANCE OF URINARY FILTRATION, INTERMITTENT, LESS THAN 6 HOURS PER DAY: ICD-10-PCS | Performed by: HOSPITALIST

## 2024-12-16 PROCEDURE — 94761 N-INVAS EAR/PLS OXIMETRY MLT: CPT

## 2024-12-16 PROCEDURE — A9500 TC99M SESTAMIBI: HCPCS | Performed by: INTERNAL MEDICINE

## 2024-12-16 PROCEDURE — 6370000000 HC RX 637 (ALT 250 FOR IP)

## 2024-12-16 DEVICE — STENT CORONARY ONYX FRONTIER RX 3.5X26 MM ZOTAROLIMUS ELUT: Type: IMPLANTABLE DEVICE | Status: FUNCTIONAL

## 2024-12-16 DEVICE — STENT ONYXNG27518UX ONYX 2.75X18RX
Type: IMPLANTABLE DEVICE | Status: FUNCTIONAL
Brand: ONYX FRONTIER™

## 2024-12-16 RX ORDER — TETRAKIS(2-METHOXYISOBUTYLISOCYANIDE)COPPER(I) TETRAFLUOROBORATE 1 MG/ML
33 INJECTION, POWDER, LYOPHILIZED, FOR SOLUTION INTRAVENOUS
Status: COMPLETED | OUTPATIENT
Start: 2024-12-16 | End: 2024-12-16

## 2024-12-16 RX ORDER — METOPROLOL SUCCINATE 25 MG/1
25 TABLET, EXTENDED RELEASE ORAL DAILY
Status: DISCONTINUED | OUTPATIENT
Start: 2024-12-16 | End: 2024-12-19 | Stop reason: HOSPADM

## 2024-12-16 RX ORDER — ACETAMINOPHEN 325 MG/1
650 TABLET ORAL EVERY 4 HOURS PRN
Status: DISCONTINUED | OUTPATIENT
Start: 2024-12-16 | End: 2024-12-19 | Stop reason: HOSPADM

## 2024-12-16 RX ORDER — TETRAKIS(2-METHOXYISOBUTYLISOCYANIDE)COPPER(I) TETRAFLUOROBORATE 1 MG/ML
11 INJECTION, POWDER, LYOPHILIZED, FOR SOLUTION INTRAVENOUS
Status: COMPLETED | OUTPATIENT
Start: 2024-12-16 | End: 2024-12-16

## 2024-12-16 RX ORDER — ONDANSETRON 2 MG/ML
INJECTION INTRAMUSCULAR; INTRAVENOUS PRN
Status: DISCONTINUED | OUTPATIENT
Start: 2024-12-16 | End: 2024-12-16 | Stop reason: HOSPADM

## 2024-12-16 RX ORDER — ASPIRIN 325 MG
TABLET, DELAYED RELEASE (ENTERIC COATED) ORAL PRN
Status: DISCONTINUED | OUTPATIENT
Start: 2024-12-16 | End: 2024-12-16 | Stop reason: HOSPADM

## 2024-12-16 RX ORDER — ASPIRIN 81 MG/1
81 TABLET, CHEWABLE ORAL DAILY
Status: DISCONTINUED | OUTPATIENT
Start: 2024-12-17 | End: 2024-12-19 | Stop reason: HOSPADM

## 2024-12-16 RX ORDER — SODIUM CHLORIDE 9 MG/ML
INJECTION, SOLUTION INTRAVENOUS PRN
Status: DISCONTINUED | OUTPATIENT
Start: 2024-12-16 | End: 2024-12-19 | Stop reason: HOSPADM

## 2024-12-16 RX ORDER — SODIUM CHLORIDE 0.9 % (FLUSH) 0.9 %
5-40 SYRINGE (ML) INJECTION PRN
Status: DISCONTINUED | OUTPATIENT
Start: 2024-12-16 | End: 2024-12-19 | Stop reason: HOSPADM

## 2024-12-16 RX ORDER — REGADENOSON 0.08 MG/ML
0.4 INJECTION, SOLUTION INTRAVENOUS
Status: COMPLETED | OUTPATIENT
Start: 2024-12-16 | End: 2024-12-16

## 2024-12-16 RX ORDER — ATROPINE SULFATE 0.1 MG/ML
INJECTION INTRAVENOUS
Status: DISCONTINUED
Start: 2024-12-16 | End: 2024-12-17 | Stop reason: WASHOUT

## 2024-12-16 RX ORDER — CLOPIDOGREL BISULFATE 75 MG/1
75 TABLET ORAL DAILY
Status: DISCONTINUED | OUTPATIENT
Start: 2024-12-17 | End: 2024-12-19 | Stop reason: HOSPADM

## 2024-12-16 RX ORDER — CLOPIDOGREL BISULFATE 75 MG/1
TABLET ORAL PRN
Status: DISCONTINUED | OUTPATIENT
Start: 2024-12-16 | End: 2024-12-16 | Stop reason: HOSPADM

## 2024-12-16 RX ORDER — HEPARIN SODIUM 200 [USP'U]/100ML
INJECTION, SOLUTION INTRAVENOUS PRN
Status: DISCONTINUED | OUTPATIENT
Start: 2024-12-16 | End: 2024-12-16 | Stop reason: HOSPADM

## 2024-12-16 RX ORDER — SODIUM CHLORIDE 9 MG/ML
INJECTION, SOLUTION INTRAVENOUS CONTINUOUS
Status: DISCONTINUED | OUTPATIENT
Start: 2024-12-16 | End: 2024-12-17

## 2024-12-16 RX ORDER — AMINOPHYLLINE 25 MG/ML
75 INJECTION, SOLUTION INTRAVENOUS ONCE
Status: COMPLETED | OUTPATIENT
Start: 2024-12-16 | End: 2024-12-16

## 2024-12-16 RX ORDER — MIDAZOLAM HYDROCHLORIDE 1 MG/ML
INJECTION, SOLUTION INTRAMUSCULAR; INTRAVENOUS PRN
Status: DISCONTINUED | OUTPATIENT
Start: 2024-12-16 | End: 2024-12-16 | Stop reason: HOSPADM

## 2024-12-16 RX ORDER — HEPARIN SODIUM 10000 [USP'U]/ML
INJECTION, SOLUTION INTRAVENOUS; SUBCUTANEOUS PRN
Status: DISCONTINUED | OUTPATIENT
Start: 2024-12-16 | End: 2024-12-16 | Stop reason: HOSPADM

## 2024-12-16 RX ORDER — SODIUM CHLORIDE 0.9 % (FLUSH) 0.9 %
5-40 SYRINGE (ML) INJECTION EVERY 12 HOURS SCHEDULED
Status: DISCONTINUED | OUTPATIENT
Start: 2024-12-16 | End: 2024-12-19 | Stop reason: HOSPADM

## 2024-12-16 RX ORDER — IOPAMIDOL 755 MG/ML
INJECTION, SOLUTION INTRAVASCULAR PRN
Status: DISCONTINUED | OUTPATIENT
Start: 2024-12-16 | End: 2024-12-16 | Stop reason: HOSPADM

## 2024-12-16 RX ORDER — REGADENOSON 0.08 MG/ML
0.4 INJECTION, SOLUTION INTRAVENOUS
Status: DISCONTINUED | OUTPATIENT
Start: 2024-12-16 | End: 2024-12-19 | Stop reason: HOSPADM

## 2024-12-16 RX ORDER — ISOSORBIDE MONONITRATE 30 MG/1
30 TABLET, EXTENDED RELEASE ORAL DAILY
Status: DISCONTINUED | OUTPATIENT
Start: 2024-12-16 | End: 2024-12-19 | Stop reason: HOSPADM

## 2024-12-16 RX ADMIN — KIT FOR THE PREPARATION OF TECHNETIUM TC99M SESTAMIBI 33 MILLICURIE: 1 INJECTION, POWDER, LYOPHILIZED, FOR SOLUTION PARENTERAL at 12:00

## 2024-12-16 RX ADMIN — HEPARIN SODIUM 5000 UNITS: 5000 INJECTION INTRAVENOUS; SUBCUTANEOUS at 21:33

## 2024-12-16 RX ADMIN — HEPARIN SODIUM 5000 UNITS: 5000 INJECTION INTRAVENOUS; SUBCUTANEOUS at 06:18

## 2024-12-16 RX ADMIN — SULFUR HEXAFLUORIDE 2 ML: 60.7; .19; .19 INJECTION, POWDER, LYOPHILIZED, FOR SUSPENSION INTRAVENOUS; INTRAVESICAL at 10:39

## 2024-12-16 RX ADMIN — HYDRALAZINE HYDROCHLORIDE 25 MG: 25 TABLET ORAL at 21:33

## 2024-12-16 RX ADMIN — OXYCODONE AND ACETAMINOPHEN 1 TABLET: 7.5; 325 TABLET ORAL at 00:12

## 2024-12-16 RX ADMIN — CEFTRIAXONE 1000 MG: 1 INJECTION, POWDER, FOR SOLUTION INTRAMUSCULAR; INTRAVENOUS at 06:18

## 2024-12-16 RX ADMIN — ATORVASTATIN CALCIUM 40 MG: 40 TABLET, FILM COATED ORAL at 21:33

## 2024-12-16 RX ADMIN — CALCIUM CARBONATE 1000 MG: 500 TABLET, CHEWABLE ORAL at 16:33

## 2024-12-16 RX ADMIN — ISOSORBIDE MONONITRATE 30 MG: 30 TABLET, EXTENDED RELEASE ORAL at 16:33

## 2024-12-16 RX ADMIN — REGADENOSON 0.4 MG: 0.08 INJECTION, SOLUTION INTRAVENOUS at 09:48

## 2024-12-16 RX ADMIN — ALBUTEROL SULFATE 2.5 MG: 2.5 SOLUTION RESPIRATORY (INHALATION) at 11:18

## 2024-12-16 RX ADMIN — CALCITRIOL 0.5 MCG: 0.5 CAPSULE, LIQUID FILLED ORAL at 21:33

## 2024-12-16 RX ADMIN — INSULIN GLARGINE 10 UNITS: 100 INJECTION, SOLUTION SUBCUTANEOUS at 21:44

## 2024-12-16 RX ADMIN — SODIUM CHLORIDE: 9 INJECTION, SOLUTION INTRAVENOUS at 16:27

## 2024-12-16 RX ADMIN — KIT FOR THE PREPARATION OF TECHNETIUM TC99M SESTAMIBI 11 MILLICURIE: 1 INJECTION, POWDER, LYOPHILIZED, FOR SOLUTION PARENTERAL at 12:00

## 2024-12-16 RX ADMIN — OXYCODONE AND ACETAMINOPHEN 1 TABLET: 7.5; 325 TABLET ORAL at 18:12

## 2024-12-16 RX ADMIN — METOPROLOL SUCCINATE 25 MG: 25 TABLET, EXTENDED RELEASE ORAL at 16:33

## 2024-12-16 RX ADMIN — AMINOPHYLLINE 75 MG: 25 INJECTION, SOLUTION INTRAVENOUS at 09:56

## 2024-12-16 RX ADMIN — OXYCODONE AND ACETAMINOPHEN 1 TABLET: 7.5; 325 TABLET ORAL at 08:59

## 2024-12-16 RX ADMIN — AZITHROMYCIN MONOHYDRATE 500 MG: 500 INJECTION, POWDER, LYOPHILIZED, FOR SOLUTION INTRAVENOUS at 11:45

## 2024-12-16 ASSESSMENT — PAIN - FUNCTIONAL ASSESSMENT
PAIN_FUNCTIONAL_ASSESSMENT: PREVENTS OR INTERFERES SOME ACTIVE ACTIVITIES AND ADLS
PAIN_FUNCTIONAL_ASSESSMENT: ACTIVITIES ARE NOT PREVENTED
PAIN_FUNCTIONAL_ASSESSMENT: PREVENTS OR INTERFERES SOME ACTIVE ACTIVITIES AND ADLS

## 2024-12-16 ASSESSMENT — PAIN DESCRIPTION - LOCATION
LOCATION: HEAD;LEG
LOCATION: LEG
LOCATION: BACK
LOCATION: LEG;FOOT
LOCATION: LEG
LOCATION: HEAD;LEG

## 2024-12-16 ASSESSMENT — PAIN SCALES - GENERAL
PAINLEVEL_OUTOF10: 3
PAINLEVEL_OUTOF10: 7

## 2024-12-16 ASSESSMENT — PAIN SCALES - WONG BAKER
WONGBAKER_NUMERICALRESPONSE: HURTS LITTLE MORE
WONGBAKER_NUMERICALRESPONSE: NO HURT

## 2024-12-16 ASSESSMENT — PAIN DESCRIPTION - PAIN TYPE
TYPE: CHRONIC PAIN
TYPE: ACUTE PAIN;CHRONIC PAIN
TYPE: ACUTE PAIN

## 2024-12-16 ASSESSMENT — PAIN DESCRIPTION - DESCRIPTORS
DESCRIPTORS: ACHING;DISCOMFORT
DESCRIPTORS: SHARP
DESCRIPTORS: ACHING;GNAWING
DESCRIPTORS: ACHING

## 2024-12-16 ASSESSMENT — PAIN DESCRIPTION - ORIENTATION
ORIENTATION: LEFT
ORIENTATION: LEFT;RIGHT
ORIENTATION: LEFT
ORIENTATION: LEFT

## 2024-12-16 ASSESSMENT — PAIN DESCRIPTION - FREQUENCY: FREQUENCY: CONTINUOUS

## 2024-12-16 NOTE — PROGRESS NOTES
ESRD (end stage renal disease) (HCC)    Problem with vascular access    Acute on chronic respiratory failure with hypoxia    CAD (coronary artery disease)    Volume overload    Gangrene (HCC)    Acute osteomyelitis of metatarsal bone of left foot    Open wound of plantar aspect of left foot    Open wound of foot, left, sequela    Chronic foot pain, left    Absence of toe of left foot (HCC)    Hyperkalemia    ESRD (end stage renal disease) on dialysis (HCC)    Diabetic ulcer of midfoot Dave 3 associated with type 2 diabetes mellitus, with muscle involvement without evidence of necrosis (HCC)    Acute pain of left knee    Pain in left lower leg    Chronic refractory osteomyelitis of foot, left    Acute hypoxic respiratory failure    Diabetic ulcer of left midfoot Dave 3 associated with type 2 diabetes mellitus, with fat layer exposed (HCC)    Type 2 diabetes mellitus with skin complication, with long-term current use of insulin (HCC)    Acute on chronic respiratory failure         Treatment:  Hemodialysis 1:1  Priority: Routine  Location: Bedside    Diabetic: Yes  NPO: No  Isolation Precautions: None     Consent for Treatment Verified: Yes  Blood Consent Verified: Not Applicable     Safety Verified: Identify (I), Consent (C), Equipment (E), HepB Status (B), Orders Complete (O), Access Verified (A), and Timeliness (T)  Time out performed prior to access at 1740 hours.    Report Received from Primary RN at 1700 hours.  Primary RN (First Initial, Last Name, Title): TOBIAS Main RN  Incapacitated Nurse Education Completed: Yes     HBsAg ONLY:  Date Drawn: December 4, 2024       Results: Negative  HBsAb:  Date Drawn:  December 4, 2024       Results: Unknown    Order  Dialysis Bath  K+ (Potassium): 2  Ca+ (Calcium):  (3.5)  Na+ (Sodium): 138  HCO3 (Bicarb): 35  Bicarbonate Concentrate Lot No.: [id number]  Acid Concentrate Lot No.: [id number]     Na+ Modeling: Not Applicable  Dialyzer: F180  Dialysate Temperature (C):   minutes  Machine Disinfection Process: Acid/Vinegar Clean, Heat Disinfect, Exterior Machine Disinfection  Rinseback Volume (ml): 300 ml  Blood Volume Processed (Liters): 60 L  Dialyzer Clearance: Moderately streaked  Duration of Treatment (minutes): 210 minutes     Hemodialysis Intake (ml): 500 ml  Hemodialysis Output (ml): 3500 ml     Tolerated Treatment: Good             Provider Notification        Handoff complete and report given to Primary RN at 2130 hours.  Primary RN (First Initial, Last Name, Title):  IRWIN Ledesma RN     Education  Person Educated: Patient   Knowledge Base: Substantial  Barriers to Learning?: None  Preferred method of Learning: Oral  Topic(s): Access Care, Signs and Symptoms of Infection, Fluid Management, Procedural, and Potassium   Teaching Tools: Demonstration and Explanation   Response to Education: Verbalized Understanding     Electronically signed by Anna Canseco RN on 12/16/2024 at 10:10 PM

## 2024-12-16 NOTE — CONSULTS
[facility] Nurse  Consult Note       Yovanny Levine  AGE: 49 y.o.   GENDER: male  : 1975  TODAY'S DATE:  2024    Subjective:     Reason for  Evaluation and Assessment: wound care eval.      Yovanny Levine is a 49 y.o. male referred by:   [x] Physician  [] Nursing  [] Other:     Wound Identification:  Wound Type: diabetic and traumatic  Contributing Factors: diabetes and obesity        PAST MEDICAL HISTORY        Diagnosis Date    Abscess 2010    scrotal    Acute renal failure (ARF) ([facility]) 2019    Anxiety associated with depression     Anxiety associated with depression     Back pain 2012    CAD (coronary artery disease) 02/10/2023    Chest pain 2013    Diabetic nephropathy ([facility])     Diabetic neuropathy ([facility]) 2011    Diabetic ulcer of left midfoot associated with type 2 diabetes mellitus, with fat layer exposed ([facility]) 2017    Diverticulosis     C scope + Dr. Medina    DM (diabetes mellitus), type 2 ([facility])     DR. 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 Medina    Nausea and vomiting 2019    Necrotizing fasciitis     Nose fracture 1988    Panic attacks     Pericarditis     Hospitalized with s/p heart cath normal    Peripheral autonomic neuropathy due to diabetes mellitus ([facility])     Axonal EMG- NCS, 2011    Sebaceous cyst 2011    URI (upper respiratory infection) 2012    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 type 2 diabetes mellitus, with muscle involvement without evidence of necrosis ([facility]) 2021    WD-Wound, surgical, infected, initial encounter 2017    Wrist fracture 1986       PAST SURGICAL HISTORY    Past Surgical History:   Procedure Laterality Date    ABDOMEN SURGERY       Foot-Dressing/Treatment:  (dry go steristrips2 abd coban2 fuoo)    Patient in bed agreeable to wound care eval. Pt has a traumatic/diabetic wounds to the scalp/rt knee/rt leg with dry eschar cleansed with NS measured and pictured. Painted with betadine. Left foot wound intact not open. Pt is generally not at risk for skin breakdown AEB nicole.     Specialty Bed Required : no  [] Low Air Loss   [] Pressure Redistribution  [] Fluid Immersion  [] Bariatric  [] Total Pressure Relief  [] Other:     Discharge Plan:  Placement for patient upon discharge: tbd  Hospice Care: no  Patient appropriate for Outpatient Wound Care Center: active    Patient/Caregiver Teaching:  Level of patient/caregiver understanding able to: pt voiced understanding.         Electronically signed by Rosie Salvador RN,  on 12/16/2024 at 12:40 PM

## 2024-12-16 NOTE — PROGRESS NOTES
with Dr. Tinoco. He was agreeable with the assessment and plan as dictated above.     Current Living situation: home  Expected Disposition: same  Estimated D/C: 24 hours    Diet Diet NPO   DVT Prophylaxis [] Lovenox, [x]  Heparin, [] SCDs, [] Ambulation,  [] Eliquis, [] Xarelto []Coumadin   Code Status Full Code   Disposition Patient requires continued admission due to chest pain   Surrogate Decision Maker/ POCHELITA Levine (son)     Personally reviewed Lab Studies and Imaging         Subjective:     Chief Complaint: Chest Pain        Patient seen and examined at bedside.  Denied chest pain this morning.  Still complaining of left lower extremity pain that he describes as sharp pain even at rest.         Review of Systems:    Pertinent positives and negatives discussed in HPI    Objective:   No intake or output data in the 24 hours ending 12/16/24 1405     Vitals:   Vitals:    12/16/24 1307   BP:    Pulse:    Resp:    Temp:    SpO2: (!) 3%       Physical Exam:   General: NAD, chronically ill-appearing  Eyes: EOMI  ENT: neck supple  Cardiovascular: Regular rate and rhythm, LUE fistula, bilateral lower extremities, warm with cap refill less than 2 seconds  Respiratory: Clear to auscultation, decreased in RLL, respirations even and unlabored on 3L NC  Gastrointestinal: Soft, non tender  Genitourinary: no suprapubic tenderness  Musculoskeletal: trace BLE edema  Skin: warm, dry  Neuro: Alert.  Psych: Mood appropriate.    Medications:   Medications:    metoprolol succinate  25 mg Oral Daily    isosorbide mononitrate  30 mg Oral Daily    calcitRIOL  0.5 mcg Oral BID    calcium carbonate  2 tablet Oral TID WC    hydrALAZINE  25 mg Oral 3 times per day    insulin glargine  10 Units SubCUTAneous Nightly    insulin lispro  0-8 Units SubCUTAneous 4x Daily AC & HS    sodium chloride flush  5-40 mL IntraVENous 2 times per day    aspirin  81 mg Oral Daily    atorvastatin  40 mg Oral Nightly    albuterol  2.5 mg Nebulization Q4H WA  RT    cefTRIAXone (ROCEPHIN) IV  1,000 mg IntraVENous Q24H    heparin (porcine)  5,000 Units SubCUTAneous 3 times per day      Infusions:    sodium chloride      dextrose       PRN Meds: regadenoson, 0.4 mg, ONCE PRN  Heparin (Porcine), , PRN  midazolam, , PRN  lidocaine, , PRN  heparin (porcine), , PRN  albuterol sulfate HFA, 2 puff, 4x Daily PRN  oxyCODONE-acetaminophen, 1 tablet, Q8H PRN  sodium chloride flush, 5-40 mL, PRN  sodium chloride, , PRN  ondansetron, 4 mg, Q8H PRN   Or  ondansetron, 4 mg, Q6H PRN  polyethylene glycol, 17 g, Daily PRN  guaiFENesin-dextromethorphan, 5 mL, Q4H PRN  glucose, 4 tablet, PRN  dextrose bolus, 125 mL, PRN   Or  dextrose bolus, 250 mL, PRN  glucagon (rDNA), 1 mg, PRN  dextrose, , Continuous PRN        Labs      Recent Results (from the past 24 hour(s))   POCT Glucose    Collection Time: 12/15/24  7:23 PM   Result Value Ref Range    POC Glucose 141 (H) 74 - 99 mg/dL   POCT Glucose    Collection Time: 12/16/24  7:45 AM   Result Value Ref Range    POC Glucose 120 (H) 74 - 99 mg/dL   Echo (TTE) complete (PRN contrast/bubble/strain/3D)    Collection Time: 12/16/24 10:43 AM   Result Value Ref Range    LA Major Harwood 6.8 cm    LA Area 4C 23.4 cm2    LA Volume MOD A4C 71 (A) 18 - 58 mL    LA Diameter 4.5 cm    AV Mean Gradient 3 mmHg    AV VTI 25.3 cm    AV Mean Velocity 0.8 m/s    AV Peak Velocity 1.2 m/s    AV Peak Gradient 5 mmHg    AV Area by VTI 2.2 cm2    AV Area by Peak Velocity 2.3 cm2    Aortic Root 3.0 cm    IVC Proxmal 2.8 cm    IVSd 1.5 (A) 0.6 - 1.0 cm    LVIDd 4.3 4.2 - 5.9 cm    LVIDs 3.5 cm    LVOT Diameter 2.1 cm    LVOT Mean Gradient 1 mmHg    LVOT VTI 16.4 cm    LVOT Peak Velocity 0.8 m/s    LVOT Peak Gradient 2 mmHg    LVPWd 1.4 (A) 0.6 - 1.0 cm    LV E' Lateral Velocity 9.00 cm/s    LV E' Septal Velocity 6.10 cm/s    LVOT Area 3.5 cm2    LVOT SV 56.8 ml    MV E Wave Deceleration Time 169.0 ms    MV A Velocity 0.30 m/s    MV E Velocity 1.08 m/s    Est. RA Pressure 3  mmHg    TR Max Velocity 2.87 m/s    TR Peak Gradient 33 mmHg    Body Surface Area 2.43 m2    Fractional Shortening 2D 19 28 - 44 %    LVIDd Index 1.84 cm/m2    LVIDs Index 1.50 cm/m2    LV RWT Ratio 0.65     LV Mass 2D 245.0 (A) 88 - 224 g    LV Mass 2D Index 104.7 49 - 115 g/m2    MV E/A 3.60     E/E' Ratio (Averaged) 14.85     E/E' Lateral 12.00     E/E' Septal 17.70     LVOT Stroke Volume Index 24.3 mL/m2    LA Volume Index MOD A4C 30 16 - 34 ml/m2    LA Size Index 1.92 cm/m2    LA/AO Root Ratio 1.50     Ao Root Index 1.28 cm/m2    AV Velocity Ratio 0.67     LVOT:AV VTI Index 0.65     WILMER/BSA VTI 0.9 cm2/m2    WILMER/BSA Peak Velocity 1.0 cm2/m2    RVSP 36 mmHg    EF Physician 50 %   POCT Glucose    Collection Time: 12/16/24 11:43 AM   Result Value Ref Range    POC Glucose 145 (H) 74 - 99 mg/dL   Nuclear stress test with myocardial perfusion    Collection Time: 12/16/24 11:59 AM   Result Value Ref Range    Baseline Systolic  mmHg    Baseline Diastolic BP 76 mmHg    Stress Systolic  mmHg    Stress Diastolic BP 84 mmHg    Baseline HR 60 BPM    Stress Peak HR 65 BPM    Stress Estimated Workload 1.0 METS    Body Surface Area 2.43 m2    Stress Rate Pressure Product 8,190 BPM*mmHg    Stress Target  bpm    Stress Percent HR Achieved 38 %    Nuc Stress EF 28 %        Imaging/Diagnostics Last 24 Hours   Vascular duplex lower extremity venous left    Result Date: 12/14/2024  EXAM: US Duplex Left Lower Extremity Veins. CLINICAL HISTORY: The patient is 49 years old and is Male; Pain left entire leg. TECHNIQUE: Real-time ultrasound scan of the veins of the left lower extremity with color Doppler flow, spectral waveform analysis and compression. COMPARISON: 8/27/2024 FINDINGS: DEEP VEINS: The common femoral, superficial femoral, and popliteal veins are echolucent and compressible. These vessels demonstrate respiratory variation and augmentation. There is normal color Doppler flow throughout. The visualized calf

## 2024-12-16 NOTE — PROGRESS NOTES
[place] [address]  Phone: [phone]    Fax [phone]                  Elen Ibarra MD, [facility]       Eric Isbell MD, [facility]  Sukhi Schafer MD, [facility]    MD Miguel Angel Watkins MD Tariq Rizvi, MD Bilal Alam, MD Dr. Waseem Sajjad MD Melissa Kellis, APRKIMBERLEE Mondragon, APRKIMBERLEE Cabrera, APRKIMBERLEE Hernandez, APRN  Jon Escudero PA-C    CARDIOLOGY  NOTE      Name:  Yovanny Levine /Age/Sex: 1975  (49 y.o. male)   MRN & CSN:  [id number] & [id number] Admission Date/Time: 2024  5:46 AM   Location:  [facility]  PCP: José Luis Izquierdo MD       Hospital Day: 3    - Cardiology consult is for: Chest pain        CARDIOLOGY ATTENDING ADDENDUM    I have seen, spoken to and examined this patient personally, independent of the NP/PAC. I have reviewed the hospital care given to date and reviewed all pertinent labs and imaging. I have spoken with patient, nursing staff and provided written and verbal instructions .The above note has been reviewed. I have spent substantive (>50%) amount of time in formulating patient care.              Physical Exam:       Head: normal  Eye: normal  Chest:  Clear,  0 Basilar crackles   Cardiovascular:  S1S2 Abdomen: soft          MEDICAL DECISION MAKING :      Atypical chest pain  History of coronary disease status post PCI with no recent follow-ups      Echocardiogram today revealing EF 40-45% with mild LVH and inferior/inferior wall hypokinesis.  Grade 3 diastolic dysfunction and severe left atrial dilation noted.  This is change in comparison to echo in August    Abnormal stress test today followed by left heart Cardi catheterization status post PCI to LAD  GDMT: Switch Lopressor to Toprol-XL 25 mg daily.  Hydralazine 25 mg 3 times daily.  Start Imdur 30 mg daily    Continue aspirin, Lipitor 40 mg daily    Peripheral arterial

## 2024-12-16 NOTE — PROGRESS NOTES
Nephrology Progress Note        [address], [address]  [place] [address]  Phone: [phone]  Office Hours: 8:30AM - 4:30PM  Monday - Friday 12/16/2024 6:14 AM  Subjective:   Admit Date: 12/14/2024  PCP: José Luis Izquierdo MD  Interval History:   On NC  No worsening dyspnea    Diet: Diet NPO      Data:   Scheduled Meds:   calcitRIOL  0.5 mcg Oral BID    calcium carbonate  2 tablet Oral TID WC    hydrALAZINE  25 mg Oral 3 times per day    insulin glargine  10 Units SubCUTAneous Nightly    insulin lispro  0-8 Units SubCUTAneous 4x Daily AC & HS    sodium chloride flush  5-40 mL IntraVENous 2 times per day    aspirin  81 mg Oral Daily    atorvastatin  40 mg Oral Nightly    albuterol  2.5 mg Nebulization Q4H WA RT    cefTRIAXone (ROCEPHIN) IV  1,000 mg IntraVENous Q24H    And    azithromycin  500 mg IntraVENous Q24H    heparin (porcine)  5,000 Units SubCUTAneous 3 times per day    metoprolol tartrate  12.5 mg Oral BID     Continuous Infusions:   sodium chloride      dextrose       PRN Meds:albuterol sulfate HFA, oxyCODONE-acetaminophen, sodium chloride flush, sodium chloride, ondansetron **OR** ondansetron, polyethylene glycol, guaiFENesin-dextromethorphan, glucose, dextrose bolus **OR** dextrose bolus, glucagon (rDNA), dextrose  I/O last 3 completed shifts:  In: 600 [P.O.:600]  Out: -   No intake/output data recorded.    Intake/Output Summary (Last 24 hours) at 12/16/2024 0614  Last data filed at 12/15/2024 1320  Gross per 24 hour   Intake 600 ml   Output --   Net 600 ml       CBC:   Recent Labs     12/14/24  0553 12/15/24  0443   WBC 6.9 5.8   HGB 11.3* 10.6*   * 114*       BMP:    Recent Labs     12/14/24  0553 12/15/24  0443    138   K 4.0 4.0   CL 93* 94*   CO2 29 24   BUN 48* 67*   CREATININE 5.8* 7.4*   GLUCOSE 152* 155*   CALCIUM 8.2* 8.0*     Hepatic:   Recent Labs     12/14/24  0553   AST 23   ALT 13   BILITOT 0.7   ALKPHOS 80       Objective:   Vitals: /83

## 2024-12-17 LAB
ANION GAP SERPL CALCULATED.3IONS-SCNC: 17 MMOL/L (ref 9–17)
BUN SERPL-MCNC: 70 MG/DL (ref 7–20)
CALCIUM SERPL-MCNC: 8.4 MG/DL (ref 8.3–10.6)
CHLORIDE SERPL-SCNC: 96 MMOL/L (ref 99–110)
CO2 SERPL-SCNC: 25 MMOL/L (ref 21–32)
CREAT SERPL-MCNC: 7.4 MG/DL (ref 0.9–1.3)
ERYTHROCYTE [DISTWIDTH] IN BLOOD BY AUTOMATED COUNT: 16.9 % (ref 11.7–14.9)
GFR, ESTIMATED: 8 ML/MIN/1.73M2
GLUCOSE BLD-MCNC: 103 MG/DL (ref 74–99)
GLUCOSE BLD-MCNC: 124 MG/DL (ref 74–99)
GLUCOSE BLD-MCNC: 146 MG/DL (ref 74–99)
GLUCOSE BLD-MCNC: 152 MG/DL (ref 74–99)
GLUCOSE BLD-MCNC: 169 MG/DL (ref 74–99)
GLUCOSE SERPL-MCNC: 119 MG/DL (ref 74–99)
HCT VFR BLD AUTO: 29.3 % (ref 42–52)
HGB BLD-MCNC: 9.3 G/DL (ref 13.5–18)
MCH RBC QN AUTO: 30 PG (ref 27–31)
MCHC RBC AUTO-ENTMCNC: 31.7 G/DL (ref 32–36)
MCV RBC AUTO: 94.5 FL (ref 78–100)
PLATELET, FLUORESCENCE: 108 K/UL (ref 140–440)
PMV BLD AUTO: 11.3 FL (ref 7.5–11.1)
POTASSIUM SERPL-SCNC: 4.6 MMOL/L (ref 3.5–5.1)
RBC # BLD AUTO: 3.1 M/UL (ref 4.6–6.2)
SODIUM SERPL-SCNC: 138 MMOL/L (ref 136–145)
WBC OTHER # BLD: 6.7 K/UL (ref 4–10.5)

## 2024-12-17 PROCEDURE — 6370000000 HC RX 637 (ALT 250 FOR IP): Performed by: INTERNAL MEDICINE

## 2024-12-17 PROCEDURE — 6370000000 HC RX 637 (ALT 250 FOR IP)

## 2024-12-17 PROCEDURE — 85027 COMPLETE CBC AUTOMATED: CPT

## 2024-12-17 PROCEDURE — 6360000002 HC RX W HCPCS: Performed by: HOSPITALIST

## 2024-12-17 PROCEDURE — 82962 GLUCOSE BLOOD TEST: CPT

## 2024-12-17 PROCEDURE — 2580000003 HC RX 258: Performed by: HOSPITALIST

## 2024-12-17 PROCEDURE — 2500000003 HC RX 250 WO HCPCS: Performed by: HOSPITALIST

## 2024-12-17 PROCEDURE — 94761 N-INVAS EAR/PLS OXIMETRY MLT: CPT

## 2024-12-17 PROCEDURE — 94640 AIRWAY INHALATION TREATMENT: CPT

## 2024-12-17 PROCEDURE — APPNB15 APP NON BILLABLE TIME 0-15 MINS

## 2024-12-17 PROCEDURE — 90935 HEMODIALYSIS ONE EVALUATION: CPT

## 2024-12-17 PROCEDURE — 99233 SBSQ HOSP IP/OBS HIGH 50: CPT | Performed by: INTERNAL MEDICINE

## 2024-12-17 PROCEDURE — 2700000000 HC OXYGEN THERAPY PER DAY

## 2024-12-17 PROCEDURE — 2060000000 HC ICU INTERMEDIATE R&B

## 2024-12-17 PROCEDURE — 80048 BASIC METABOLIC PNL TOTAL CA: CPT

## 2024-12-17 PROCEDURE — 6370000000 HC RX 637 (ALT 250 FOR IP): Performed by: NURSE PRACTITIONER

## 2024-12-17 PROCEDURE — 6370000000 HC RX 637 (ALT 250 FOR IP): Performed by: HOSPITALIST

## 2024-12-17 PROCEDURE — 1200000000 HC SEMI PRIVATE

## 2024-12-17 RX ADMIN — ONDANSETRON 4 MG: 2 INJECTION INTRAMUSCULAR; INTRAVENOUS at 05:26

## 2024-12-17 RX ADMIN — ALBUTEROL SULFATE 2.5 MG: 2.5 SOLUTION RESPIRATORY (INHALATION) at 19:26

## 2024-12-17 RX ADMIN — ISOSORBIDE MONONITRATE 30 MG: 30 TABLET, EXTENDED RELEASE ORAL at 12:01

## 2024-12-17 RX ADMIN — CALCITRIOL 0.5 MCG: 0.5 CAPSULE, LIQUID FILLED ORAL at 21:32

## 2024-12-17 RX ADMIN — SODIUM CHLORIDE, PRESERVATIVE FREE 10 ML: 5 INJECTION INTRAVENOUS at 12:04

## 2024-12-17 RX ADMIN — CALCIUM CARBONATE 1000 MG: 500 TABLET, CHEWABLE ORAL at 18:09

## 2024-12-17 RX ADMIN — HYDRALAZINE HYDROCHLORIDE 25 MG: 25 TABLET ORAL at 05:18

## 2024-12-17 RX ADMIN — CLOPIDOGREL BISULFATE 75 MG: 75 TABLET ORAL at 12:01

## 2024-12-17 RX ADMIN — METOPROLOL SUCCINATE 25 MG: 25 TABLET, EXTENDED RELEASE ORAL at 12:01

## 2024-12-17 RX ADMIN — HEPARIN SODIUM 5000 UNITS: 5000 INJECTION INTRAVENOUS; SUBCUTANEOUS at 21:32

## 2024-12-17 RX ADMIN — HYDRALAZINE HYDROCHLORIDE 25 MG: 25 TABLET ORAL at 21:32

## 2024-12-17 RX ADMIN — HYDRALAZINE HYDROCHLORIDE 25 MG: 25 TABLET ORAL at 14:09

## 2024-12-17 RX ADMIN — ASPIRIN 81 MG: 81 TABLET, CHEWABLE ORAL at 12:01

## 2024-12-17 RX ADMIN — SODIUM CHLORIDE, PRESERVATIVE FREE 10 ML: 5 INJECTION INTRAVENOUS at 21:32

## 2024-12-17 RX ADMIN — HEPARIN SODIUM 5000 UNITS: 5000 INJECTION INTRAVENOUS; SUBCUTANEOUS at 05:18

## 2024-12-17 RX ADMIN — INSULIN GLARGINE 10 UNITS: 100 INJECTION, SOLUTION SUBCUTANEOUS at 21:31

## 2024-12-17 RX ADMIN — HEPARIN SODIUM 5000 UNITS: 5000 INJECTION INTRAVENOUS; SUBCUTANEOUS at 14:09

## 2024-12-17 RX ADMIN — OXYCODONE AND ACETAMINOPHEN 1 TABLET: 7.5; 325 TABLET ORAL at 05:18

## 2024-12-17 RX ADMIN — CALCIUM CARBONATE 1000 MG: 500 TABLET, CHEWABLE ORAL at 12:01

## 2024-12-17 RX ADMIN — OXYCODONE AND ACETAMINOPHEN 1 TABLET: 7.5; 325 TABLET ORAL at 14:13

## 2024-12-17 RX ADMIN — ATORVASTATIN CALCIUM 40 MG: 40 TABLET, FILM COATED ORAL at 21:32

## 2024-12-17 RX ADMIN — OXYCODONE AND ACETAMINOPHEN 1 TABLET: 7.5; 325 TABLET ORAL at 22:57

## 2024-12-17 RX ADMIN — CALCITRIOL 0.5 MCG: 0.5 CAPSULE, LIQUID FILLED ORAL at 12:01

## 2024-12-17 ASSESSMENT — PAIN DESCRIPTION - ORIENTATION
ORIENTATION: RIGHT;LEFT
ORIENTATION: LEFT;RIGHT
ORIENTATION: LEFT;RIGHT
ORIENTATION: LEFT

## 2024-12-17 ASSESSMENT — PAIN SCALES - GENERAL
PAINLEVEL_OUTOF10: 7
PAINLEVEL_OUTOF10: 6
PAINLEVEL_OUTOF10: 6
PAINLEVEL_OUTOF10: 7
PAINLEVEL_OUTOF10: 5
PAINLEVEL_OUTOF10: 6
PAINLEVEL_OUTOF10: 7

## 2024-12-17 ASSESSMENT — PAIN DESCRIPTION - DESCRIPTORS
DESCRIPTORS: DISCOMFORT
DESCRIPTORS: DISCOMFORT;ACHING;SORE
DESCRIPTORS: DISCOMFORT

## 2024-12-17 ASSESSMENT — PAIN DESCRIPTION - LOCATION
LOCATION: FOOT;LEG
LOCATION: LEG
LOCATION: GENERALIZED
LOCATION: LEG;BACK;GROIN
LOCATION: LEG;BACK;GROIN

## 2024-12-17 ASSESSMENT — PAIN DESCRIPTION - ONSET
ONSET: ON-GOING
ONSET: ON-GOING

## 2024-12-17 ASSESSMENT — PAIN DESCRIPTION - PAIN TYPE
TYPE: CHRONIC PAIN
TYPE: ACUTE PAIN;CHRONIC PAIN

## 2024-12-17 ASSESSMENT — PAIN DESCRIPTION - FREQUENCY
FREQUENCY: CONTINUOUS
FREQUENCY: CONTINUOUS

## 2024-12-17 NOTE — PROGRESS NOTES
[place] [address]  Phone: [phone]    Fax [phone]                  Elen Ibarra MD, [facility]       Eric Isbell MD, [facility]  Sukhi Schafer MD, [facility]    MD Miguel Angel Watkins MD Tariq Rizvi, MD Bilal Alam, MD Dr. Waseem Sajjad MD Melissa Kellis, APRKIMBERLEE Mondragon, APRKIMBERLEE Cabrera, APRKIMBERLEE Henrandez, APRN  Jon Escudero PA-C    CARDIOLOGY  NOTE      Name:  Yovanny Levine /Age/Sex: 1975  (49 y.o. male)   MRN & CSN:  [id number] & [id number] Admission Date/Time: 2024  5:46 AM   Location:  -A PCP: José Luis Izquierdo MD       Hospital Day: 4    - Cardiology consult is for: Chest pain        CARDIOLOGY ATTENDING ADDENDUM    I have seen, spoken to and examined this patient personally, independent of the NP/PAC. I have reviewed the hospital care given to date and reviewed all pertinent labs and imaging. I have spoken with patient, nursing staff and provided written and verbal instructions .The above note has been reviewed. I have spent substantive (>50%) amount of time in formulating patient care.              Physical Exam:       Head: normal  Eye: normal  Chest:  Clear,  0 Basilar crackles   Cardiovascular:  S1S2 Abdomen: soft          MEDICAL DECISION MAKING :      Atypical chest pain  History of coronary disease status post PCI with no recent follow-ups      Echocardiogram today revealing EF 40-45% with mild LVH and inferior/inferior wall hypokinesis.  Grade 3 diastolic dysfunction and severe left atrial dilation noted.  This has change in comparison to prior echo    Abnormal stress test followed by left heart     S/p PCI proximal and distal LAD yesterday    GDMT: Toprol-XL 25 mg daily.  Hydralazine 25 mg 3 times daily and Imdur 30 mg daily    Continue aspirin and Plavix for minimum of 6 months    Continue Lipitor 40 mg daily    Peripheral arterial  (Left, 8/30/2023); Upper gastrointestinal endoscopy (N/A, 9/4/2023); Toe amputation (Left, 9/6/2023); Foot Debridement (Left, 4/23/2024); Toe amputation (Left, 4/23/2024); Cardiac procedure (N/A, 12/16/2024); Cardiac procedure (N/A, 12/16/2024); and Cardiac procedure (N/A, 12/16/2024).    Physical Exam  Constitutional:       Appearance: He is not ill-appearing.   HENT:      Mouth/Throat:      Comments: Pupils equal and round  Cardiovascular:      Rate and Rhythm: Normal rate and regular rhythm.      Comments: right  femoral artery access site dressing is clean, dry and intact, site is soft without hematoma, or erythema noted.  Mild ecchymosis noted surrounding access site  Pulmonary:      Effort: Pulmonary effort is normal.      Breath sounds: Normal breath sounds.   Abdominal:      Palpations: Abdomen is soft.   Musculoskeletal:      Right lower leg: No edema.      Left lower leg: No edema.   Skin:     General: Skin is warm.      Capillary Refill: Capillary refill takes less than 2 seconds.   Neurological:      Mental Status: He is alert and oriented to person, place, and time.          Medications:    metoprolol succinate  25 mg Oral Daily    isosorbide mononitrate  30 mg Oral Daily    sodium chloride flush  5-40 mL IntraVENous 2 times per day    clopidogrel  75 mg Oral Daily    aspirin  81 mg Oral Daily    calcitRIOL  0.5 mcg Oral BID    calcium carbonate  2 tablet Oral TID WC    hydrALAZINE  25 mg Oral 3 times per day    insulin glargine  10 Units SubCUTAneous Nightly    insulin lispro  0-8 Units SubCUTAneous 4x Daily AC & HS    sodium chloride flush  5-40 mL IntraVENous 2 times per day    atorvastatin  40 mg Oral Nightly    albuterol  2.5 mg Nebulization Q4H WA RT    heparin (porcine)  5,000 Units SubCUTAneous 3 times per day      sodium chloride      dextrose       regadenoson, sodium chloride flush, sodium chloride, acetaminophen, albuterol sulfate HFA, oxyCODONE-acetaminophen, sodium chloride flush,

## 2024-12-17 NOTE — PROGRESS NOTES
V2.0  [facility] Hospitalist Progress Note      Name:  Yovanny Levine /Age/Sex: 1975  (49 y.o. male)   MRN & CSN:  [id number] & [id number] Encounter Date/Time: 2024 7:38 AM EST    Location:  -A PCP: José Luis Izquierdo MD       Hospital Day: 4    Assessment and Plan:   Yovanny Levine is a 49 y.o. male who presents with Chest pain    Chest pain  Elevated troponin  Abnormal stress test   - presented with acute on chronic left-sided chest pain   - trop 164, 149,164 -chronically elevated.  Chart reviewed, likely due to ESRD  -Stress test  abnormal  -s/p left heart catheterization and PCI of proximal and distal LAD yesterday  -Plan for continuing DAPT for 6 months minimum and Lipitor    Acute on chronic respiratory failure with hypoxia   -wears 3L NC at night, currently requiring 3L NC continously  - likely due to hypervolemia   - may need new home O2 eval    Acute on chronic combined systolic and diastolic CHF  -Echo with EF 40%, G3 DD  -CXR with signs of CHF  -BNP greater than 70K  -Discussed with nephrology, continue volume management with dialysis    Possible pneumonia  -chest x-ray shows right lower lobe opacity. Had recent admission with R-sided pleural effusion s/p thoracentesis    - repeat 2 view CXR with signs of CHF  - strep/legionella antigens negative   - afebrile, no leukocytosis  - respiratory panel and PNA panel negative  - procal elevated, but may be due to renal disease  - received rocephin and azithromycin, will monitor off abx for now    Left lower extremity pain  PAD  - LE DVT US -ve  - arterial duplex with hemodynamically significant stenosis of the left infrapopliteal vessels, unchanged from prior studies  -Left lower extremity appears well-perfused  -Cardiology planning for outpatient valuation    ESRD  -Patient receives dialysis on .  - nephrology following     Hypertension  -on metoprolol, hydralazine    T2DM  -Continue Lantus with sliding  veins of the left lower extremity with color Doppler flow, spectral waveform analysis and compression. COMPARISON: 8/27/2024 FINDINGS: DEEP VEINS: The common femoral, superficial femoral, and popliteal veins are echolucent and compressible. These vessels demonstrate respiratory variation and augmentation. There is normal color Doppler flow throughout. The visualized calf veins are also patent. SUPERFICIAL VEINS: The visualized greater saphenous vein is patent. SOFT TISSUES: No popliteal fossa cyst or other abnormalities. OTHER FINDINGS: -VAS DUP LOWER EXTREMITY VENOUS LEFT     1. No deep venous thrombosis in the left lower extremity. No change from 4 months earlier. Electronically signed by Tan Reynoso    XR CHEST PORTABLE    Result Date: 12/14/2024  Chest X-ray INDICATION: Chest pain. COMPARISON: 11/15/2024. TECHNIQUE: AP/PA view of the chest was obtained. FINDINGS: Cardiomegaly is identified. There is mild prominence of the pulmonary interstitium. Right lower lobe opacity is identified. No sizable pleural effusion or pneumothorax is present.     1. Right lower lobe opacity likely relating to infiltrate. Continued follow-up is recommended to ensure resolution. 2. Stable cardiomegaly. Electronically signed by Abdi Schafer      Electronically signed by Broderick Schafer MD on 12/17/2024 at 7:57 AM

## 2024-12-17 NOTE — PROGRESS NOTES
ACT results 175- ok'd to pull early per Dr. George  right femoral 6 fr. sheath removed.   Manual pressure held per Mimi RN x 25 minutes.  No bruising, drainage, or swelling noted. No hematoma.  DSD/Tegederm applied to site.  Pulses palpable , VSS.  Pt. instructed on post sheath removal care/restrictions and verbalized an understanding.

## 2024-12-17 NOTE — CARE COORDINATION
12/17/24 1500   Service Assessment   Patient Orientation Alert and Oriented   Cognition Alert   History Provided By Patient;Medical Record   Primary Caregiver Self   Support Systems Family Members  (Has waiver assistance)   Patient's Healthcare Decision Maker is: Legal Next of Kin   PCP Verified by CM Yes   Prior Functional Level Assistance with the following:;Mobility  (Uses cane)   Current Functional Level Assistance with the following:;Toileting;Mobility  (Hospital policy)   Can patient return to prior living arrangement Yes   Ability to make needs known: Good   Family able to assist with home care needs: Yes   Would you like for me to discuss the discharge plan with any other family members/significant others, and if so, who? No  (Not necessary)   Financial Resources Medicare;Medicaid   Community Resources Transportation  (Waiver services)   Social/Functional History   Lives With Alone   Type of Home House   Active  No   Patient's  Info Uses [facility] - for HD   Occupation On disability   Discharge Planning   Type of Residence House   Living Arrangements Alone   Current Services Prior To Admission Oxygen Therapy   Current DME Prior to Arrival Oxygen Therapy (Comment);Cane     Met with patient for discharge planning. He is awake and able to participate. Patient is from home alone. Stated \"fairly independent\". He uses a cane. Patient has home O2 provided by [employer]. He has Waiver Services- gets home delivered meals and has [employer]. He will get an aide when available. He receives HD- M-W-F 11:[address] on [address]. Transportation provided by [facility]. Patient gets medications from [facility] [address]. Patient is open to HHC- chose [facility]. CM will follow for needs. Natali Morales RN

## 2024-12-17 NOTE — PROGRESS NOTES
Nephrology Progress Note        [address], [address]  [place] [address]  Phone: [phone]  Office Hours: 8:30AM - 4:30PM  Monday - Friday 12/17/2024 7:32 AM  Subjective:   Admit Date: 12/14/2024  PCP: José Luis Izquierdo MD  Interval History:   On NC  Ended up having an abnormal stress test and coronary artery stents were placed yesterday    Diet: ADULT DIET; Regular; Low Fat/Low Chol/High Fiber/2 gm Na; Low Potassium (Less than 3000 mg/day); Low Phosphorus (Less than 1000 mg)      Data:   Scheduled Meds:   metoprolol succinate  25 mg Oral Daily    isosorbide mononitrate  30 mg Oral Daily    sodium chloride flush  5-40 mL IntraVENous 2 times per day    clopidogrel  75 mg Oral Daily    aspirin  81 mg Oral Daily    calcitRIOL  0.5 mcg Oral BID    calcium carbonate  2 tablet Oral TID WC    hydrALAZINE  25 mg Oral 3 times per day    insulin glargine  10 Units SubCUTAneous Nightly    insulin lispro  0-8 Units SubCUTAneous 4x Daily AC & HS    sodium chloride flush  5-40 mL IntraVENous 2 times per day    atorvastatin  40 mg Oral Nightly    albuterol  2.5 mg Nebulization Q4H WA RT    heparin (porcine)  5,000 Units SubCUTAneous 3 times per day     Continuous Infusions:   sodium chloride      dextrose       PRN Meds:regadenoson, sodium chloride flush, sodium chloride, acetaminophen, albuterol sulfate HFA, oxyCODONE-acetaminophen, sodium chloride flush, ondansetron **OR** ondansetron, polyethylene glycol, guaiFENesin-dextromethorphan, glucose, dextrose bolus **OR** dextrose bolus, glucagon (rDNA), dextrose  I/O last 3 completed shifts:  In: 500   Out: 3520 [Blood:20]  No intake/output data recorded.    Intake/Output Summary (Last 24 hours) at 12/17/2024 0732  Last data filed at 12/16/2024 2115  Gross per 24 hour   Intake 500 ml   Output 3520 ml   Net -3020 ml       CBC:   Recent Labs     12/15/24  0443   WBC 5.8   HGB 10.6*   *       BMP:    Recent Labs     12/15/24  0443      K 4.0    CL 94*   CO2 24   BUN 67*   CREATININE 7.4*   GLUCOSE 155*   CALCIUM 8.0*       Objective:   Vitals: /69   Pulse 61   Temp 97.7 °F (36.5 °C) (Oral)   Resp 16   Ht 1.753 m (5' 9\")   Wt 121 kg (266 lb 12.1 oz)   SpO2 95%   BMI 39.39 kg/m²   General appearance: alert and cooperative with exam, in no acute distress  HEENT: normocephalic, atraumatic,   Neck: supple, trachea midline  Lungs: breathing comfortably on nc  Abdomen: soft, non-tender; non distended,  Extremities: extremities atraumatic, no cyanosis or edema  Neurologic: alert, oriented, follows commands, interactive    MEDICAL DECISION MAKING   -RLL pneumonia  -Chest pain and hx of CAD: ended up having 2 coronary stents placed on 12/16/24  -ESRD on HD MWF  -Hypocalcemia  -CKD-MBD  -Anemia of ESRD  -DM2  -Hx diabetic foot ulcer: seems to have healed now     Suggest:  -Please stop NS in this pt with ESRD and hypervolemia  -UF planned today  -Continue calcium carbonate and calcitriol  -Retacrit for hgb<10  -Left AVF so keep that arm free of IV access, BP cuffs, etc     Thank you                       Electronically signed by Melody James DO on 12/17/2024 at 7:32 AM    [facility]  MD SAMANTHA SMITH DO  [address],  [address]  [place] [address]  PHONE: [phone]  FAX: [phone]

## 2024-12-17 NOTE — PROGRESS NOTES
Patient Name: Yovanny Levine  Patient : 1975  MRN: [id number]     Acct: [id number]  Date of Admission: 2024  Room/Bed: -A  Code Status:  Full Code  Allergies:   Allergies   Allergen Reactions    Adhesive Tape Rash    Doxycycline Nausea And Vomiting    Reglan [Metoclopramide] Anxiety     Diagnosis:    Patient Active Problem List   Diagnosis    Hiatal hernia    Diabetes mellitus type 2, improved controlled    Diabetic neuropathy (HCC)    Panic attack    Anxiety associated with depression    Neck pain    DANIELA (obstructive sleep apnea)    Urinary frequency    Bilateral carpal tunnel syndrome- left worse than right    Hyperlipidemia with target LDL less than 100    Essential hypertension    Bronchitis    Osteomyelitis    Abscess    Periumbilical hernia    Sepsis (HCC)    Cellulitis of left foot    Constipation    Intractable nausea and vomiting    Uncontrolled type 2 diabetes mellitus    Nausea and vomiting    Diabetic foot infection ([facility])    Acute renal failure (ARF) ([facility])    Cellulitis    Cellulitis of left arm    Cellulitis, scrotum    Acute epididymitis    Irene gangrene    Recurrent major depressive disorder, in full remission ([facility])    Generalized anxiety disorder    Necrotizing fasciitis    Syncope    Right groin wound    Nephrotic syndrome    Generalized edema    Stage 3b chronic kidney disease (HCC)    Diabetic nephropathy associated with type 2 diabetes mellitus ([facility])    Hypoalbuminemia    Chronic kidney disease, stage IV (severe) ([facility])    FH: CAD (coronary artery disease)    ASCVD (arteriosclerotic cardiovascular disease)    Other proteinuria    Chest pain    Surgical wound dehiscence    CARMEN (acute kidney injury) ([facility])    Anemia    Chest tightness    NSTEMI (non-ST elevated myocardial infarction) ([facility])    Diabetic foot ulcer with osteomyelitis ([facility])    ESRD (end stage renal disease) ([facility])    Problem with vascular access    Acute on chronic respiratory failure with hypoxia    CAD

## 2024-12-17 NOTE — CARE COORDINATION
CM reviewed pt's medical record. CM in to see pt to discuss discharge plans, but pt was not in room. Will return at later time.

## 2024-12-18 ENCOUNTER — APPOINTMENT (OUTPATIENT)
Dept: GENERAL RADIOLOGY | Age: 49
DRG: 321 | End: 2024-12-18
Payer: MEDICARE

## 2024-12-18 LAB
ALBUMIN SERPL-MCNC: 3.8 G/DL (ref 3.4–5)
ALBUMIN/GLOB SERPL: 1.2 {RATIO} (ref 1.1–2.2)
ALP SERPL-CCNC: 76 U/L (ref 40–129)
ALT SERPL-CCNC: 10 U/L (ref 10–40)
ANION GAP SERPL CALCULATED.3IONS-SCNC: 20 MMOL/L (ref 9–17)
AST SERPL-CCNC: 17 U/L (ref 15–37)
BILIRUB SERPL-MCNC: 0.4 MG/DL (ref 0–1)
BUN SERPL-MCNC: 87 MG/DL (ref 7–20)
CALCIUM SERPL-MCNC: 8.4 MG/DL (ref 8.3–10.6)
CHLORIDE SERPL-SCNC: 93 MMOL/L (ref 99–110)
CO2 SERPL-SCNC: 20 MMOL/L (ref 21–32)
CREAT SERPL-MCNC: 8.7 MG/DL (ref 0.9–1.3)
CRP SERPL HS-MCNC: 49.1 MG/L (ref 0–5)
ERYTHROCYTE [DISTWIDTH] IN BLOOD BY AUTOMATED COUNT: 16.9 % (ref 11.7–14.9)
GFR, ESTIMATED: 7 ML/MIN/1.73M2
GLUCOSE BLD-MCNC: 111 MG/DL (ref 74–99)
GLUCOSE BLD-MCNC: 118 MG/DL (ref 74–99)
GLUCOSE BLD-MCNC: 145 MG/DL (ref 74–99)
GLUCOSE BLD-MCNC: 323 MG/DL (ref 74–99)
GLUCOSE SERPL-MCNC: 102 MG/DL (ref 74–99)
HCT VFR BLD AUTO: 33.1 % (ref 42–52)
HGB BLD-MCNC: 10.2 G/DL (ref 13.5–18)
MAGNESIUM SERPL-MCNC: 2.7 MG/DL (ref 1.8–2.4)
MCH RBC QN AUTO: 29.7 PG (ref 27–31)
MCHC RBC AUTO-ENTMCNC: 30.8 G/DL (ref 32–36)
MCV RBC AUTO: 96.2 FL (ref 78–100)
PHOSPHATE SERPL-MCNC: 10.9 MG/DL (ref 2.5–4.9)
PLATELET # BLD AUTO: 117 K/UL (ref 140–440)
PMV BLD AUTO: 11.1 FL (ref 7.5–11.1)
POTASSIUM SERPL-SCNC: 5 MMOL/L (ref 3.5–5.1)
PROCALCITONIN SERPL-MCNC: 0.88 NG/ML
PROT SERPL-MCNC: 7 G/DL (ref 6.4–8.2)
RBC # BLD AUTO: 3.44 M/UL (ref 4.6–6.2)
S PYO AG THROAT QL: NEGATIVE
SODIUM SERPL-SCNC: 133 MMOL/L (ref 136–145)
SPECIMEN SOURCE: NORMAL
WBC OTHER # BLD: 6.8 K/UL (ref 4–10.5)

## 2024-12-18 PROCEDURE — 94640 AIRWAY INHALATION TREATMENT: CPT

## 2024-12-18 PROCEDURE — 85027 COMPLETE CBC AUTOMATED: CPT

## 2024-12-18 PROCEDURE — 87081 CULTURE SCREEN ONLY: CPT

## 2024-12-18 PROCEDURE — 84100 ASSAY OF PHOSPHORUS: CPT

## 2024-12-18 PROCEDURE — 82962 GLUCOSE BLOOD TEST: CPT

## 2024-12-18 PROCEDURE — 6360000002 HC RX W HCPCS: Performed by: STUDENT IN AN ORGANIZED HEALTH CARE EDUCATION/TRAINING PROGRAM

## 2024-12-18 PROCEDURE — 6360000002 HC RX W HCPCS: Performed by: HOSPITALIST

## 2024-12-18 PROCEDURE — 36415 COLL VENOUS BLD VENIPUNCTURE: CPT

## 2024-12-18 PROCEDURE — 6370000000 HC RX 637 (ALT 250 FOR IP): Performed by: INTERNAL MEDICINE

## 2024-12-18 PROCEDURE — 84145 PROCALCITONIN (PCT): CPT

## 2024-12-18 PROCEDURE — 94761 N-INVAS EAR/PLS OXIMETRY MLT: CPT

## 2024-12-18 PROCEDURE — 71045 X-RAY EXAM CHEST 1 VIEW: CPT

## 2024-12-18 PROCEDURE — 83735 ASSAY OF MAGNESIUM: CPT

## 2024-12-18 PROCEDURE — 2500000003 HC RX 250 WO HCPCS: Performed by: INTERNAL MEDICINE

## 2024-12-18 PROCEDURE — 86140 C-REACTIVE PROTEIN: CPT

## 2024-12-18 PROCEDURE — 90935 HEMODIALYSIS ONE EVALUATION: CPT

## 2024-12-18 PROCEDURE — 6370000000 HC RX 637 (ALT 250 FOR IP): Performed by: HOSPITALIST

## 2024-12-18 PROCEDURE — 6370000000 HC RX 637 (ALT 250 FOR IP): Performed by: NURSE PRACTITIONER

## 2024-12-18 PROCEDURE — 1200000000 HC SEMI PRIVATE

## 2024-12-18 PROCEDURE — 6370000000 HC RX 637 (ALT 250 FOR IP): Performed by: STUDENT IN AN ORGANIZED HEALTH CARE EDUCATION/TRAINING PROGRAM

## 2024-12-18 PROCEDURE — 6370000000 HC RX 637 (ALT 250 FOR IP)

## 2024-12-18 PROCEDURE — 2700000000 HC OXYGEN THERAPY PER DAY

## 2024-12-18 PROCEDURE — 87880 STREP A ASSAY W/OPTIC: CPT

## 2024-12-18 PROCEDURE — 80053 COMPREHEN METABOLIC PANEL: CPT

## 2024-12-18 PROCEDURE — 2500000003 HC RX 250 WO HCPCS: Performed by: HOSPITALIST

## 2024-12-18 RX ORDER — LIDOCAINE 4 G/G
1 PATCH TOPICAL DAILY
Status: DISCONTINUED | OUTPATIENT
Start: 2024-12-18 | End: 2024-12-19 | Stop reason: HOSPADM

## 2024-12-18 RX ORDER — IBUPROFEN 400 MG/1
400 TABLET, FILM COATED ORAL ONCE
Status: COMPLETED | OUTPATIENT
Start: 2024-12-18 | End: 2024-12-18

## 2024-12-18 RX ORDER — ALBUTEROL SULFATE 0.83 MG/ML
2.5 SOLUTION RESPIRATORY (INHALATION)
Status: DISCONTINUED | OUTPATIENT
Start: 2024-12-18 | End: 2024-12-19 | Stop reason: HOSPADM

## 2024-12-18 RX ADMIN — INSULIN LISPRO 6 UNITS: 100 INJECTION, SOLUTION INTRAVENOUS; SUBCUTANEOUS at 21:21

## 2024-12-18 RX ADMIN — ALBUTEROL SULFATE 2.5 MG: 2.5 SOLUTION RESPIRATORY (INHALATION) at 19:50

## 2024-12-18 RX ADMIN — ONDANSETRON 4 MG: 2 INJECTION INTRAMUSCULAR; INTRAVENOUS at 08:19

## 2024-12-18 RX ADMIN — SODIUM CHLORIDE, PRESERVATIVE FREE 10 ML: 5 INJECTION INTRAVENOUS at 21:18

## 2024-12-18 RX ADMIN — CALCIUM CARBONATE 1000 MG: 500 TABLET, CHEWABLE ORAL at 17:02

## 2024-12-18 RX ADMIN — INSULIN GLARGINE 10 UNITS: 100 INJECTION, SOLUTION SUBCUTANEOUS at 21:17

## 2024-12-18 RX ADMIN — HEPARIN SODIUM 5000 UNITS: 5000 INJECTION INTRAVENOUS; SUBCUTANEOUS at 06:05

## 2024-12-18 RX ADMIN — HYDRALAZINE HYDROCHLORIDE 25 MG: 25 TABLET ORAL at 06:05

## 2024-12-18 RX ADMIN — SODIUM CHLORIDE, PRESERVATIVE FREE 10 ML: 5 INJECTION INTRAVENOUS at 08:13

## 2024-12-18 RX ADMIN — OXYCODONE AND ACETAMINOPHEN 1 TABLET: 7.5; 325 TABLET ORAL at 08:19

## 2024-12-18 RX ADMIN — ALBUTEROL SULFATE 2.5 MG: 2.5 SOLUTION RESPIRATORY (INHALATION) at 11:51

## 2024-12-18 RX ADMIN — ISOSORBIDE MONONITRATE 30 MG: 30 TABLET, EXTENDED RELEASE ORAL at 08:13

## 2024-12-18 RX ADMIN — ATORVASTATIN CALCIUM 40 MG: 40 TABLET, FILM COATED ORAL at 21:16

## 2024-12-18 RX ADMIN — SODIUM CHLORIDE, PRESERVATIVE FREE 10 ML: 5 INJECTION INTRAVENOUS at 21:17

## 2024-12-18 RX ADMIN — IBUPROFEN 400 MG: 400 TABLET, FILM COATED ORAL at 18:05

## 2024-12-18 RX ADMIN — ASPIRIN 81 MG: 81 TABLET, CHEWABLE ORAL at 08:13

## 2024-12-18 RX ADMIN — CLOPIDOGREL BISULFATE 75 MG: 75 TABLET ORAL at 08:13

## 2024-12-18 RX ADMIN — METOPROLOL SUCCINATE 25 MG: 25 TABLET, EXTENDED RELEASE ORAL at 08:13

## 2024-12-18 RX ADMIN — HYDRALAZINE HYDROCHLORIDE 25 MG: 25 TABLET ORAL at 21:17

## 2024-12-18 RX ADMIN — CALCITRIOL 0.5 MCG: 0.5 CAPSULE, LIQUID FILLED ORAL at 08:13

## 2024-12-18 RX ADMIN — CALCITRIOL 0.5 MCG: 0.5 CAPSULE, LIQUID FILLED ORAL at 21:17

## 2024-12-18 RX ADMIN — HEPARIN SODIUM 5000 UNITS: 5000 INJECTION INTRAVENOUS; SUBCUTANEOUS at 21:16

## 2024-12-18 RX ADMIN — OXYCODONE AND ACETAMINOPHEN 1 TABLET: 7.5; 325 TABLET ORAL at 17:02

## 2024-12-18 RX ADMIN — CALCIUM CARBONATE 1000 MG: 500 TABLET, CHEWABLE ORAL at 08:13

## 2024-12-18 ASSESSMENT — PAIN SCALES - GENERAL
PAINLEVEL_OUTOF10: 7
PAINLEVEL_OUTOF10: 7

## 2024-12-18 ASSESSMENT — PAIN DESCRIPTION - LOCATION
LOCATION: CHEST;BACK
LOCATION: CHEST;FOOT;LEG

## 2024-12-18 ASSESSMENT — PAIN DESCRIPTION - ORIENTATION
ORIENTATION: LEFT;LOWER
ORIENTATION: LEFT;UPPER

## 2024-12-18 ASSESSMENT — PAIN DESCRIPTION - DESCRIPTORS
DESCRIPTORS: ACHING;NAGGING
DESCRIPTORS: ACHING

## 2024-12-18 NOTE — PROGRESS NOTES
V2.0  [facility] Hospitalist Progress Note      Name:  Yovanny Levine /Age/Sex: 1975  (49 y.o. male)   MRN & CSN:  [id number] & [id number] Encounter Date/Time: 2024 7:38 AM EST    Location:  -A PCP: José Luis Izquierdo MD       Hospital Day: 5    Assessment and Plan:   Yovanny Levine is a 49 y.o. male who presents with Chest pain    Chest pain  Elevated troponin  Abnormal stress test   - presented with acute on chronic left-sided chest pain   - trop 164, 149,164 -chronically elevated.  Chart reviewed, likely due to ESRD  -Stress test  abnormal  -s/p left heart catheterization and PCI of proximal and distal LAD yesterday  -Plan for continuing DAPT for 6 months minimum and Lipitor    Acute on chronic respiratory failure with hypoxia   -wears 3L NC at night, currently requiring 3L NC continously  - likely due to hypervolemia   - may need new home O2 eval    Acute on chronic combined systolic and diastolic CHF  -Echo with EF 40%, G3 DD  -CXR with signs of CHF  -BNP greater than 70K  -Discussed with nephrology, continue volume management with dialysis    Possible pneumonia  -chest x-ray shows right lower lobe opacity. Had recent admission with R-sided pleural effusion s/p thoracentesis    - repeat 2 view CXR with signs of CHF  - strep/legionella antigens negative   - afebrile, no leukocytosis  - respiratory panel and PNA panel negative  - procal elevated, but may be due to renal disease  - received rocephin and azithromycin, will monitor off abx for now    Left lower extremity pain  PAD  - LE DVT US -ve  - arterial duplex with hemodynamically significant stenosis of the left infrapopliteal vessels, unchanged from prior studies  -Left lower extremity appears well-perfused  -Cardiology planning for outpatient valuation    ESRD  -Patient receives dialysis on .  - nephrology following     Hypertension  -on metoprolol, hydralazine    T2DM  -Continue Lantus with sliding  change from 4 months earlier. Electronically signed by Tan Reynoso    XR CHEST PORTABLE    Result Date: 12/14/2024  Chest X-ray INDICATION: Chest pain. COMPARISON: 11/15/2024. TECHNIQUE: AP/PA view of the chest was obtained. FINDINGS: Cardiomegaly is identified. There is mild prominence of the pulmonary interstitium. Right lower lobe opacity is identified. No sizable pleural effusion or pneumothorax is present.     1. Right lower lobe opacity likely relating to infiltrate. Continued follow-up is recommended to ensure resolution. 2. Stable cardiomegaly. Electronically signed by Abdi Schafer      Electronically signed by Broderick Schafer MD on 12/18/2024 at 10:49 AM

## 2024-12-18 NOTE — PROGRESS NOTES
Nephrology Progress Note        [address], [address]  [place] [address]  Phone: [phone]  Office Hours: 8:30AM - 4:30PM  Monday - Friday 12/18/2024 6:55 AM  Subjective:   Admit Date: 12/14/2024  PCP: José Luis Izquierdo MD  Interval History:   On NC  Good BP  Doing ok    Diet: ADULT DIET; Regular; Low Fat/Low Chol/High Fiber/2 gm Na; Low Potassium (Less than 3000 mg/day); Low Phosphorus (Less than 1000 mg)      Data:   Scheduled Meds:   metoprolol succinate  25 mg Oral Daily    isosorbide mononitrate  30 mg Oral Daily    sodium chloride flush  5-40 mL IntraVENous 2 times per day    clopidogrel  75 mg Oral Daily    aspirin  81 mg Oral Daily    calcitRIOL  0.5 mcg Oral BID    calcium carbonate  2 tablet Oral TID WC    hydrALAZINE  25 mg Oral 3 times per day    insulin glargine  10 Units SubCUTAneous Nightly    insulin lispro  0-8 Units SubCUTAneous 4x Daily AC & HS    sodium chloride flush  5-40 mL IntraVENous 2 times per day    atorvastatin  40 mg Oral Nightly    albuterol  2.5 mg Nebulization Q4H WA RT    heparin (porcine)  5,000 Units SubCUTAneous 3 times per day     Continuous Infusions:   sodium chloride      dextrose       PRN Meds:regadenoson, sodium chloride flush, sodium chloride, acetaminophen, albuterol sulfate HFA, oxyCODONE-acetaminophen, sodium chloride flush, ondansetron **OR** ondansetron, polyethylene glycol, guaiFENesin-dextromethorphan, glucose, dextrose bolus **OR** dextrose bolus, glucagon (rDNA), dextrose  I/O last 3 completed shifts:  In: 1480 [P.O.:480]  Out: 6793 [Blood:20]  No intake/output data recorded.    Intake/Output Summary (Last 24 hours) at 12/18/2024 0655  Last data filed at 12/17/2024 1811  Gross per 24 hour   Intake 980 ml   Output 3273 ml   Net -2293 ml       CBC:   Recent Labs     12/17/24  0800 12/18/24  0402   WBC 6.7 6.8   HGB 9.3* 10.2*   PLT  --  117*       BMP:    Recent Labs     12/17/24  0800 12/18/24  0402    133*   K 4.6 5.0   CL 96*

## 2024-12-18 NOTE — RT PROTOCOL NOTE
RT Inhaler-Nebulizer Bronchodilator Protocol Note    There is a bronchodilator order in the chart from a provider indicating to follow the RT Bronchodilator Protocol and there is an “Initiate RT Inhaler-Nebulizer Bronchodilator Protocol” order as well (see protocol at bottom of note).    CXR Findings:  No results found.    The findings from the last RT Protocol Assessment were as follows:   History Pulmonary Disease: Chronic pulmonary disease  Respiratory Pattern: Regular pattern and RR 12-20 bpm  Breath Sounds: Slightly diminished and/or crackles  Cough: Strong, spontaneous, non-productive  Indication for Bronchodilator Therapy:    Bronchodilator Assessment Score: 4    Aerosolized bronchodilator medication orders have been revised according to the RT Inhaler-Nebulizer Bronchodilator Protocol below.    Respiratory Therapist to perform RT Therapy Protocol Assessment initially then follow the protocol.  Repeat RT Therapy Protocol Assessment PRN for score 0-3 or on second treatment, BID, and PRN for scores above 3.    No Indications - adjust the frequency to every 6 hours PRN wheezing or bronchospasm, if no treatments needed after 48 hours then discontinue using Per Protocol order mode.     If indication present, adjust the RT bronchodilator orders based on the Bronchodilator Assessment Score as indicated below.  Use Inhaler orders unless patient has one or more of the following: on home nebulizer, not able to hold breath for 10 seconds, is not alert and oriented, cannot activate and use MDI correctly, or respiratory rate 25 breaths per minute or more, then use the equivalent nebulizer order(s) with same Frequency and PRN reasons based on the score.  If a patient is on this medication at home then do not decrease Frequency below that used at home.    0-3 - enter or revise RT bronchodilator order(s) to equivalent RT Bronchodilator order with Frequency of every 4 hours PRN for wheezing or increased work of breathing  using Per Protocol order mode.        4-6 - enter or revise RT Bronchodilator order(s) to two equivalent RT bronchodilator orders with one order with BID Frequency and one order with Frequency of every 4 hours PRN wheezing or increased work of breathing using Per Protocol order mode.        7-10 - enter or revise RT Bronchodilator order(s) to two equivalent RT bronchodilator orders with one order with TID Frequency and one order with Frequency of every 4 hours PRN wheezing or increased work of breathing using Per Protocol order mode.       11-13 - enter or revise RT Bronchodilator order(s) to one equivalent RT bronchodilator order with QID Frequency and an Albuterol order with Frequency of every 4 hours PRN wheezing or increased work of breathing using Per Protocol order mode.      Greater than 13 - enter or revise RT Bronchodilator order(s) to one equivalent RT bronchodilator order with every 4 hours Frequency and an Albuterol order with Frequency of every 2 hours PRN wheezing or increased work of breathing using Per Protocol order mode.     RT to enter RT Home Evaluation for COPD & MDI Assessment order using Per Protocol order mode.    Electronically signed by Eunice Bryant RCP on 12/18/2024 at 7:58 AM

## 2024-12-18 NOTE — PROGRESS NOTES
Patient Name: Yovanny Levine  Patient : 1975  MRN: [id number]     Acct: [id number]  Date of Admission: 2024  Room/Bed: -A  Code Status:  Full Code  Allergies:   Allergies   Allergen Reactions    Adhesive Tape Rash    Doxycycline Nausea And Vomiting    Reglan [Metoclopramide] Anxiety     Diagnosis:    Patient Active Problem List   Diagnosis    Hiatal hernia    Diabetes mellitus type 2, improved controlled    Diabetic neuropathy (HCC)    Panic attack    Anxiety associated with depression    Neck pain    DANIELA (obstructive sleep apnea)    Urinary frequency    Bilateral carpal tunnel syndrome- left worse than right    Hyperlipidemia with target LDL less than 100    Essential hypertension    Bronchitis    Osteomyelitis    Abscess    Periumbilical hernia    Sepsis (HCC)    Cellulitis of left foot    Constipation    Intractable nausea and vomiting    Uncontrolled type 2 diabetes mellitus    Nausea and vomiting    Diabetic foot infection ([facility])    Acute renal failure (ARF) ([facility])    Cellulitis    Cellulitis of left arm    Cellulitis, scrotum    Acute epididymitis    Irene gangrene    Recurrent major depressive disorder, in full remission ([facility])    Generalized anxiety disorder    Necrotizing fasciitis    Syncope    Right groin wound    Nephrotic syndrome    Generalized edema    Stage 3b chronic kidney disease (HCC)    Diabetic nephropathy associated with type 2 diabetes mellitus ([facility])    Hypoalbuminemia    Chronic kidney disease, stage IV (severe) ([facility])    FH: CAD (coronary artery disease)    ASCVD (arteriosclerotic cardiovascular disease)    Other proteinuria    Chest pain    Surgical wound dehiscence    CARMEN (acute kidney injury) ([facility])    Anemia    Chest tightness    NSTEMI (non-ST elevated myocardial infarction) ([facility])    Diabetic foot ulcer with osteomyelitis ([facility])    ESRD (end stage renal disease) ([facility])    Problem with vascular access    Acute on chronic respiratory failure with hypoxia    CAD  [id number]  Manual Ph: 7.2  Bleach Test (Neg): Yes  Bath Temperature: 96.8 °F (36 °C)  Tubing Lot#: [id number]  Conductivity Meter Serial #: [id number]  All Connections Secure?: Yes  Venous Parameters Set?: Yes  Arterial Parameters Set?: Yes  Saline Line Double Clamped?: Yes  Air Foam Detector Engaged?: Yes  Machine Functioning Alarm Free? Yes  Prime Given: 200ml    Chlorine Testing - Before each treatment and every 4 hours:   Treatment  Time On: 0758  Time Off: 1622  Treatment Goal: 3kg  Weight - Scale: 119.4 kg (263 lb 3.7 oz) (12/18/24 0408)  1st check: less than 0.1 ppm at: 1010 hours  2nd check: less than 0.1 ppm at: 1310 hours  3rd check: Not Applicable  (if greater than 0.1 ppm, then check every 30 minutes from secondary)    Access Flows and Pressures  Patient Vitals for the past 8 hrs:   Blood Flow Rate (ml/min) Ultrafiltration Rate (ml/hr) Ultrafiltration Removed (ml) Arterial Pressure (mmHg) Venous Pressure (mmHg) TMP DFR Comments Access Visible   12/18/24 1251 350 ml/min 990 ml/hr -- -100 mmHg 170 70 700 tx initiated Yes   12/18/24 1300 350 ml/min 990 ml/hr 297 ml -110 mmHg 170 80 700 resting with eyes closed Yes   12/18/24 1315 350 ml/min 990 ml/hr 470 ml -110 mmHg 170 80 700 no distress Yes   12/18/24 1330 350 ml/min 990 ml/hr 748 ml -110 mmHg 180 80 700 resting Yes   12/18/24 1345 350 ml/min 990 ml/hr 1006 ml -120 mmHg 180 80 700 no complaints Yes   12/18/24 1400 350 ml/min 990 ml/hr 1193 ml -120 mmHg 180 80 700 lines secure Yes   12/18/24 1415 350 ml/min 990 ml/hr 1430 ml -120 mmHg 180 80 700 resting Yes   12/18/24 1430 350 ml/min 990 ml/hr 1755 ml -120 mmHg 180 80 700 resting Yes   12/18/24 1445 350 ml/min 990 ml/hr 1955 ml -120 mmHg 180 80 700 no complaints Yes   12/18/24 1500 350 ml/min 990 ml/hr 2187 ml -120 mmHg 190 80 700 resting Yes   12/18/24 1515 350 ml/min 990 ml/hr 2436 ml -120 mmHg 190 90 700 lines secure Yes   12/18/24 1530 350 ml/min 990 ml/hr 2671 ml -120 mmHg 190 90 700 resting Yes   12/18/24

## 2024-12-19 VITALS
HEIGHT: 69 IN | BODY MASS INDEX: 37.91 KG/M2 | SYSTOLIC BLOOD PRESSURE: 140 MMHG | DIASTOLIC BLOOD PRESSURE: 80 MMHG | WEIGHT: 255.95 LBS | RESPIRATION RATE: 18 BRPM | OXYGEN SATURATION: 98 % | TEMPERATURE: 98 F | HEART RATE: 61 BPM

## 2024-12-19 PROBLEM — L98.492 SKIN ULCER OF SCALP WITH FAT LAYER EXPOSED (HCC): Status: ACTIVE | Noted: 2024-12-19

## 2024-12-19 PROBLEM — L97.912 TRAUMATIC LEG ULCER, RIGHT, WITH FAT LAYER EXPOSED (HCC): Status: ACTIVE | Noted: 2024-12-19

## 2024-12-19 LAB
ALBUMIN SERPL-MCNC: 3.8 G/DL (ref 3.4–5)
ALBUMIN/GLOB SERPL: 1.1 {RATIO} (ref 1.1–2.2)
ALP SERPL-CCNC: 82 U/L (ref 40–129)
ALT SERPL-CCNC: 12 U/L (ref 10–40)
ANION GAP SERPL CALCULATED.3IONS-SCNC: 16 MMOL/L (ref 9–17)
AST SERPL-CCNC: 19 U/L (ref 15–37)
BILIRUB SERPL-MCNC: 0.5 MG/DL (ref 0–1)
BUN SERPL-MCNC: 63 MG/DL (ref 7–20)
CALCIUM SERPL-MCNC: 9.6 MG/DL (ref 8.3–10.6)
CHLORIDE SERPL-SCNC: 95 MMOL/L (ref 99–110)
CO2 SERPL-SCNC: 25 MMOL/L (ref 21–32)
CREAT SERPL-MCNC: 7.2 MG/DL (ref 0.9–1.3)
ERYTHROCYTE [DISTWIDTH] IN BLOOD BY AUTOMATED COUNT: 16.7 % (ref 11.7–14.9)
GFR, ESTIMATED: 8 ML/MIN/1.73M2
GLUCOSE BLD-MCNC: 124 MG/DL (ref 74–99)
GLUCOSE BLD-MCNC: 214 MG/DL (ref 74–99)
GLUCOSE SERPL-MCNC: 133 MG/DL (ref 74–99)
HCT VFR BLD AUTO: 32 % (ref 42–52)
HGB BLD-MCNC: 9.9 G/DL (ref 13.5–18)
MAGNESIUM SERPL-MCNC: 2.4 MG/DL (ref 1.8–2.4)
MCH RBC QN AUTO: 29.3 PG (ref 27–31)
MCHC RBC AUTO-ENTMCNC: 30.9 G/DL (ref 32–36)
MCV RBC AUTO: 94.7 FL (ref 78–100)
PHOSPHATE SERPL-MCNC: 8.1 MG/DL (ref 2.5–4.9)
PLATELET # BLD AUTO: 135 K/UL (ref 140–440)
PMV BLD AUTO: 10.8 FL (ref 7.5–11.1)
POTASSIUM SERPL-SCNC: 5.1 MMOL/L (ref 3.5–5.1)
POTASSIUM SERPL-SCNC: 5.3 MMOL/L (ref 3.5–5.1)
PROT SERPL-MCNC: 7.3 G/DL (ref 6.4–8.2)
RBC # BLD AUTO: 3.38 M/UL (ref 4.6–6.2)
SODIUM SERPL-SCNC: 136 MMOL/L (ref 136–145)
WBC OTHER # BLD: 5.9 K/UL (ref 4–10.5)

## 2024-12-19 PROCEDURE — 6370000000 HC RX 637 (ALT 250 FOR IP)

## 2024-12-19 PROCEDURE — 80053 COMPREHEN METABOLIC PANEL: CPT

## 2024-12-19 PROCEDURE — 82962 GLUCOSE BLOOD TEST: CPT

## 2024-12-19 PROCEDURE — 6370000000 HC RX 637 (ALT 250 FOR IP): Performed by: HOSPITALIST

## 2024-12-19 PROCEDURE — 2500000003 HC RX 250 WO HCPCS: Performed by: HOSPITALIST

## 2024-12-19 PROCEDURE — 6360000002 HC RX W HCPCS: Performed by: HOSPITALIST

## 2024-12-19 PROCEDURE — 2500000003 HC RX 250 WO HCPCS: Performed by: INTERNAL MEDICINE

## 2024-12-19 PROCEDURE — 84100 ASSAY OF PHOSPHORUS: CPT

## 2024-12-19 PROCEDURE — 36415 COLL VENOUS BLD VENIPUNCTURE: CPT

## 2024-12-19 PROCEDURE — 6370000000 HC RX 637 (ALT 250 FOR IP): Performed by: INTERNAL MEDICINE

## 2024-12-19 PROCEDURE — 83735 ASSAY OF MAGNESIUM: CPT

## 2024-12-19 PROCEDURE — 85027 COMPLETE CBC AUTOMATED: CPT

## 2024-12-19 PROCEDURE — 6370000000 HC RX 637 (ALT 250 FOR IP): Performed by: NURSE PRACTITIONER

## 2024-12-19 PROCEDURE — 84132 ASSAY OF SERUM POTASSIUM: CPT

## 2024-12-19 PROCEDURE — 94761 N-INVAS EAR/PLS OXIMETRY MLT: CPT

## 2024-12-19 PROCEDURE — 6370000000 HC RX 637 (ALT 250 FOR IP): Performed by: STUDENT IN AN ORGANIZED HEALTH CARE EDUCATION/TRAINING PROGRAM

## 2024-12-19 PROCEDURE — 2700000000 HC OXYGEN THERAPY PER DAY

## 2024-12-19 RX ORDER — GUAIFENESIN/DEXTROMETHORPHAN 100-10MG/5
5 SYRUP ORAL EVERY 4 HOURS PRN
Qty: 120 ML | Refills: 0 | Status: SHIPPED | OUTPATIENT
Start: 2024-12-19 | End: 2024-12-29

## 2024-12-19 RX ORDER — METOPROLOL SUCCINATE 25 MG/1
25 TABLET, EXTENDED RELEASE ORAL DAILY
Qty: 30 TABLET | Refills: 3 | Status: SHIPPED | OUTPATIENT
Start: 2024-12-20

## 2024-12-19 RX ORDER — AMOXICILLIN AND CLAVULANATE POTASSIUM 125; 31.25 MG/5ML; MG/5ML
125 FOR SUSPENSION ORAL 2 TIMES DAILY
Qty: 50 ML | Refills: 0 | Status: SHIPPED | OUTPATIENT
Start: 2024-12-19 | End: 2024-12-24

## 2024-12-19 RX ORDER — CLOPIDOGREL BISULFATE 75 MG/1
75 TABLET ORAL DAILY
Qty: 30 TABLET | Refills: 3 | Status: SHIPPED | OUTPATIENT
Start: 2024-12-20

## 2024-12-19 RX ORDER — ISOSORBIDE MONONITRATE 30 MG/1
30 TABLET, EXTENDED RELEASE ORAL DAILY
Qty: 30 TABLET | Refills: 3 | Status: SHIPPED | OUTPATIENT
Start: 2024-12-20

## 2024-12-19 RX ORDER — ASPIRIN 81 MG/1
81 TABLET, CHEWABLE ORAL DAILY
Qty: 30 TABLET | Refills: 3 | Status: SHIPPED | OUTPATIENT
Start: 2024-12-20

## 2024-12-19 RX ORDER — ATORVASTATIN CALCIUM 40 MG/1
40 TABLET, FILM COATED ORAL NIGHTLY
Qty: 30 TABLET | Refills: 3 | Status: SHIPPED | OUTPATIENT
Start: 2024-12-19

## 2024-12-19 RX ADMIN — SODIUM CHLORIDE, PRESERVATIVE FREE 10 ML: 5 INJECTION INTRAVENOUS at 08:43

## 2024-12-19 RX ADMIN — OXYCODONE AND ACETAMINOPHEN 1 TABLET: 7.5; 325 TABLET ORAL at 02:05

## 2024-12-19 RX ADMIN — CALCIUM CARBONATE 1000 MG: 500 TABLET, CHEWABLE ORAL at 08:41

## 2024-12-19 RX ADMIN — CLOPIDOGREL BISULFATE 75 MG: 75 TABLET ORAL at 08:41

## 2024-12-19 RX ADMIN — SODIUM ZIRCONIUM CYCLOSILICATE 10 G: 10 POWDER, FOR SUSPENSION ORAL at 08:49

## 2024-12-19 RX ADMIN — HEPARIN SODIUM 5000 UNITS: 5000 INJECTION INTRAVENOUS; SUBCUTANEOUS at 13:59

## 2024-12-19 RX ADMIN — HEPARIN SODIUM 5000 UNITS: 5000 INJECTION INTRAVENOUS; SUBCUTANEOUS at 06:11

## 2024-12-19 RX ADMIN — CALCIUM CARBONATE 1000 MG: 500 TABLET, CHEWABLE ORAL at 11:53

## 2024-12-19 RX ADMIN — INSULIN LISPRO 2 UNITS: 100 INJECTION, SOLUTION INTRAVENOUS; SUBCUTANEOUS at 14:00

## 2024-12-19 RX ADMIN — CALCITRIOL 0.5 MCG: 0.5 CAPSULE, LIQUID FILLED ORAL at 08:41

## 2024-12-19 RX ADMIN — SODIUM ZIRCONIUM CYCLOSILICATE 10 G: 10 POWDER, FOR SUSPENSION ORAL at 12:55

## 2024-12-19 RX ADMIN — ISOSORBIDE MONONITRATE 30 MG: 30 TABLET, EXTENDED RELEASE ORAL at 08:41

## 2024-12-19 RX ADMIN — METOPROLOL SUCCINATE 25 MG: 25 TABLET, EXTENDED RELEASE ORAL at 08:41

## 2024-12-19 RX ADMIN — OXYCODONE AND ACETAMINOPHEN 1 TABLET: 7.5; 325 TABLET ORAL at 11:52

## 2024-12-19 RX ADMIN — ASPIRIN 81 MG: 81 TABLET, CHEWABLE ORAL at 08:41

## 2024-12-19 RX ADMIN — HYDRALAZINE HYDROCHLORIDE 25 MG: 25 TABLET ORAL at 13:59

## 2024-12-19 RX ADMIN — SODIUM CHLORIDE, PRESERVATIVE FREE 10 ML: 5 INJECTION INTRAVENOUS at 08:42

## 2024-12-19 ASSESSMENT — PAIN DESCRIPTION - LOCATION
LOCATION: LEG
LOCATION: GROIN;LEG
LOCATION: LEG;FOOT

## 2024-12-19 ASSESSMENT — PAIN DESCRIPTION - PAIN TYPE: TYPE: CHRONIC PAIN

## 2024-12-19 ASSESSMENT — PAIN DESCRIPTION - ORIENTATION
ORIENTATION: LEFT

## 2024-12-19 ASSESSMENT — PAIN SCALES - GENERAL
PAINLEVEL_OUTOF10: 5
PAINLEVEL_OUTOF10: 8
PAINLEVEL_OUTOF10: 6
PAINLEVEL_OUTOF10: 8
PAINLEVEL_OUTOF10: 6
PAINLEVEL_OUTOF10: 0

## 2024-12-19 ASSESSMENT — PAIN - FUNCTIONAL ASSESSMENT
PAIN_FUNCTIONAL_ASSESSMENT: ACTIVITIES ARE NOT PREVENTED

## 2024-12-19 ASSESSMENT — PAIN DESCRIPTION - ONSET: ONSET: ON-GOING

## 2024-12-19 ASSESSMENT — PAIN DESCRIPTION - DESCRIPTORS
DESCRIPTORS: ACHING

## 2024-12-19 ASSESSMENT — PAIN DESCRIPTION - FREQUENCY: FREQUENCY: CONTINUOUS

## 2024-12-19 NOTE — DISCHARGE SUMMARY
V2.0  Discharge Summary    Name:  Yovanny Levine /Age/Sex: 1975 (49 y.o. male)   Admit Date: 2024  Discharge Date: 24    MRN & CSN:  [id number] & [id number] Encounter Date and Time 24 11:14 AM EST    Attending:  Broderick Schafer MD Discharging Provider: Broderick Schafer MD       Hospital Course:     Brief HPI: As per admitting physician, \"Yovanny Levine is a 49 y.o. male with pmh of ESRD, CAD, osteomyelitis who presents with chest pain     Patient states around 3-4 a.m. today he had chest pain.  He describes it as a stabbing sensation.  Radiate down his left arm.  It was a 7 out of 10.  Lasted a couple hours until the paramedics gave him nitro.  He has noticed this pain mostly at night.  He has had it a couple times a week.  He states it also occurred a few days earlier.  He states his chest gets a little tight.  He has had stents placed in his heart in the past.  He quit smoking a few years ago.  He does do marijuana.  He has hypertension, diabetes mellitus, and hyperlipidemia.  His father had an MI.  Patient denies any fevers.  He has had some shortness of breath, but no cough.  No sick contacts.  He wears oxygen when sleeping.  He states this chest pain occurred during a sleep clinic evaluation for sleep apnea.  He has had a little bit of swelling.  A little bit of lightheadedness.       He states he finished antibiotics for osteomyelitis last week.  He is also following up with the wound clinic for his amputations.  He has hemodialysis on .''    Patient was admitted for chest pain workup.  Cardiology was consulted.  Patient was also having some possible right lower lobe pneumonia for which he received 3 days of antibiotics IV while inpatient with improvement in symptoms.  Hospital stay with hemodialysis sessions as per nephrology.  Patient's stress test  abnormal followed by left heart cath status post PCI to LAD.  GDMT managed as per cardiology while inpatient.   Tibia Fibula, 2 Views. CLINICAL HISTORY: The patient is 49 years old and is Male. Left leg pain. Pneumonia, unspecified organism. COMPARISON: CT of the left tibia and fibula from 8/1/2023 performed with contrast. Plain films of the left tibia and fibula from 2/17/2022. FINDINGS: BONES: No acute fracture or focal osseous lesion. JOINTS: No dislocation. The joint spaces are normal. SOFT TISSUES: The soft tissues are unremarkable except for arterial calcifications.     1. No acute osseous abnormality. No change from the prior studies. Electronically signed by Tan Reynoso    XR CHEST (2 VW)    Result Date: 12/15/2024  EXAM: XR Chest, 2 Views. CLINICAL HISTORY: The patient is 49 years old and is Male ; shortness of breath pneumonia, unspecified organism / Reason for exam:->shortness of breath COMPARISON: Comparison is made to the prior radiograph from one day earlier. FINDINGS: LUNGS: There is slight interval improvement in pulmonary vascular congestion and pulmonary edema. The lungs are clear. No consolidation. PLEURAL SPACES: Stable small bilateral pleural effusions. No pleural effusion or pneumothorax. HEART: The heart size is top normal. The mediastinal contour is unremarkable. There are electrocardiogram leads present. BONES: No acute osseous abnormality.     1. Slight interval improvement in pulmonary vascular congestion and pulmonary edema, with stable small bilateral pleural effusions. However, this does represent acute decompensated congestive heart failure. Electronically signed by Tan Reynoso    Vascular duplex lower extremity arteries left    Result Date: 12/15/2024  EXAM: US Duplex Left Lower Extremity Arteries. CLINICAL HISTORY: The patient is 49 years old and is Male; Rest Pain. TECHNIQUE: Real-time ultrasound scan of the arteries of the left lower extremity with 2-D gray scale, color Doppler flow and spectral waveform analysis. COMPARISON: CT of the left tibia and fibula with contrast. Ultrasound arterial  YELLOW 08/28/2024 03:09 AM    PHUR 7.0 08/28/2024 03:09 AM    PHUR 6.5 06/20/2013 09:51 AM    WBCUA 3 08/28/2024 03:09 AM    RBCUA 1 08/28/2024 03:09 AM    MUCUS RARE 07/24/2022 11:40 PM    TRICHOMONAS NONE SEEN 08/28/2024 03:09 AM    BACTERIA RARE 08/28/2024 03:09 AM    CLARITYU CLEAR 08/28/2024 03:09 AM    SPECGRAV 1.025 06/20/2013 09:51 AM    LEUKOCYTESUR NEGATIVE 08/28/2024 03:09 AM    UROBILINOGEN 1.0 08/28/2024 03:09 AM    BILIRUBINUR NEGATIVE 08/28/2024 03:09 AM    BLOODU TRACE 08/28/2024 03:09 AM    GLUCOSEU 250 08/28/2024 03:09 AM    KETUA NEGATIVE 08/28/2024 03:09 AM    AMORPHOUS RARE 07/24/2022 11:40 PM     Urine Cultures: No results found for: \"LABURIN\"  Blood Cultures: No results found for: \"BC\"  No results found for: \"BLOODCULT2\"  Organism:   Lab Results   Component Value Date/Time    ORG SAUR 01/07/2019 12:08 AM     Discharge instructions given to the patient:    Please take your Medications regularly and set up appointments to follow up with your Primary Care Physician, cardiologist & nephrologist soon within 1 week    If having any new, persistent or worsening symptoms including but not limited to chest pain, shorness of breath, loss of consciousness, palpitations , chest or abdomen pressure, left arm pain or pressure, severe headache, especially with new weakness/numbness or tingling in any part of body, any gait or balance issues, any fever/ chill, cough, sputum,  buring with urine , any bleeding or dark stools etc  ,please return to nearest facility for evaluation    Please go through Printed reading material and feel free to reach out in case of any queries, concerns or issues per contact provided      Time Spent Discharging patient 35 minutes    Electronically signed by Broderick Schafer MD on 12/19/2024 at 11:14 AM

## 2024-12-19 NOTE — PROGRESS NOTES
Patient discharge order noted, Dr Schafer wants to repeat potasium level before discharge.     Potasium level back as 5.1, per Dr Schafer, patient can be discharged after Lokelma x1 dose.    Patient would like eat lunch first. Awaiting transportation.

## 2024-12-19 NOTE — PROGRESS NOTES
Nephrology Progress Note        [address], [address] [place] [address]  Phone: [phone]  Office Hours: 8:30AM - 4:30PM  Monday - Friday 12/19/2024 8:32 AM  Subjective:   Admit Date: 12/14/2024  PCP: José Luis Izquierdo MD  Interval History:   On NC  Reports cramps in HD yesterday    Diet: ADULT DIET; Regular; Low Fat/Low Chol/High Fiber/2 gm Na; Low Potassium (Less than 3000 mg/day); Low Phosphorus (Less than 1000 mg)      Data:   Scheduled Meds:   sodium zirconium cyclosilicate  10 g Oral Once    albuterol  2.5 mg Nebulization BID RT    lidocaine  1 patch TransDERmal Daily    metoprolol succinate  25 mg Oral Daily    isosorbide mononitrate  30 mg Oral Daily    sodium chloride flush  5-40 mL IntraVENous 2 times per day    clopidogrel  75 mg Oral Daily    aspirin  81 mg Oral Daily    calcitRIOL  0.5 mcg Oral BID    calcium carbonate  2 tablet Oral TID WC    hydrALAZINE  25 mg Oral 3 times per day    insulin glargine  10 Units SubCUTAneous Nightly    insulin lispro  0-8 Units SubCUTAneous 4x Daily AC & HS    sodium chloride flush  5-40 mL IntraVENous 2 times per day    atorvastatin  40 mg Oral Nightly    heparin (porcine)  5,000 Units SubCUTAneous 3 times per day     Continuous Infusions:   sodium chloride      dextrose       PRN Meds:regadenoson, sodium chloride flush, sodium chloride, acetaminophen, albuterol sulfate HFA, oxyCODONE-acetaminophen, sodium chloride flush, ondansetron **OR** ondansetron, polyethylene glycol, guaiFENesin-dextromethorphan, glucose, dextrose bolus **OR** dextrose bolus, glucagon (rDNA), dextrose  I/O last 3 completed shifts:  In: 1670 [P.O.:1160; I.V.:10]  Out: 3500   No intake/output data recorded.    Intake/Output Summary (Last 24 hours) at 12/19/2024 0833  Last data filed at 12/18/2024 2355  Gross per 24 hour   Intake 1400 ml   Output 3500 ml   Net -2100 ml       CBC:   Recent Labs     12/17/24  0800 12/18/24  0402 12/19/24  0356   WBC 6.7 6.8 5.9   HGB

## 2024-12-19 NOTE — PLAN OF CARE
Problem: Chronic Conditions and Co-morbidities  Goal: Patient's chronic conditions and co-morbidity symptoms are monitored and maintained or improved  Outcome: Progressing  Flowsheets (Taken 12/18/2024 1923)  Care Plan - Patient's Chronic Conditions and Co-Morbidity Symptoms are Monitored and Maintained or Improved:   Monitor and assess patient's chronic conditions and comorbid symptoms for stability, deterioration, or improvement   Collaborate with multidisciplinary team to address chronic and comorbid conditions and prevent exacerbation or deterioration   Update acute care plan with appropriate goals if chronic or comorbid symptoms are exacerbated and prevent overall improvement and discharge     Problem: Discharge Planning  Goal: Discharge to home or other facility with appropriate resources  Outcome: Progressing  Flowsheets (Taken 12/18/2024 1923)  Discharge to home or other facility with appropriate resources:   Identify barriers to discharge with patient and caregiver   Arrange for needed discharge resources and transportation as appropriate   Identify discharge learning needs (meds, wound care, etc)   Refer to discharge planning if patient needs post-hospital services based on physician order or complex needs related to functional status, cognitive ability or social support system     Problem: Pain  Goal: Verbalizes/displays adequate comfort level or baseline comfort level  Outcome: Progressing  Flowsheets  Taken 12/18/2024 2355  Verbalizes/displays adequate comfort level or baseline comfort level:   Encourage patient to monitor pain and request assistance   Assess pain using appropriate pain scale   Administer analgesics based on type and severity of pain and evaluate response   Implement non-pharmacological measures as appropriate and evaluate response   Consider cultural and social influences on pain and pain management   Notify Licensed Independent Practitioner if interventions unsuccessful or patient  level  Goal: Return ADL status to a safe level of function  Outcome: Progressing  Flowsheets (Taken 12/18/2024 1923)  Return ADL Status to a Safe Level of Function:   Administer medication as ordered   Assess activities of daily living deficits and provide assistive devices as needed   Obtain physical therapy/occupational therapy consults as needed   Assist and instruct patient to increase activity and self care as tolerated     Problem: Gastrointestinal - Adult  Goal: Minimal or absence of nausea and vomiting  Outcome: Progressing  Flowsheets (Taken 12/18/2024 1923)  Minimal or absence of nausea and vomiting:   Administer IV fluids as ordered to ensure adequate hydration   Maintain NPO status until nausea and vomiting are resolved   Nasogastric tube to low intermittent suction as ordered   Administer ordered antiemetic medications as needed   Provide nonpharmacologic comfort measures as appropriate   Advance diet as tolerated, if ordered   Nutrition consult to assist patient with adequate nutrition and appropriate food choices  Goal: Maintains or returns to baseline bowel function  Outcome: Progressing  Flowsheets (Taken 12/18/2024 1923)  Maintains or returns to baseline bowel function:   Assess bowel function   Encourage oral fluids to ensure adequate hydration   Administer IV fluids as ordered to ensure adequate hydration   Administer ordered medications as needed   Encourage mobilization and activity   Nutrition consult to assist patient with appropriate food choices  Goal: Maintains adequate nutritional intake  Outcome: Progressing  Flowsheets (Taken 12/18/2024 1923)  Maintains adequate nutritional intake:   Monitor percentage of each meal consumed   Identify factors contributing to decreased intake, treat as appropriate   Assist with meals as needed   Monitor intake and output, weight and lab values   Obtain nutritional consult as needed     Problem: Genitourinary - Adult  Goal: Absence of urinary  electrolyte replacement as ordered   Monitor response to electrolyte replacements, including repeat lab results as appropriate   Fluid restriction as ordered   Instruct patient on fluid and nutrition restrictions as appropriate  Goal: Hemodynamic stability and optimal renal function maintained  Outcome: Progressing  Flowsheets (Taken 12/18/2024 1923)  Hemodynamic stability and optimal renal function maintained:   Monitor labs and assess for signs and symptoms of volume excess or deficit   Monitor intake, output and patient weight   Monitor urine specific gravity, serum osmolarity and serum sodium as indicated or ordered   Monitor response to interventions for patient's volume status, including labs, urine output, blood pressure (other measures as available)   Encourage oral intake as appropriate   Instruct patient on fluid and nutrition restrictions as appropriate     Problem: Hematologic - Adult  Goal: Maintains hematologic stability  Outcome: Progressing  Flowsheets (Taken 12/18/2024 1923)  Maintains hematologic stability:   Assess for signs and symptoms of bleeding or hemorrhage   Monitor labs for bleeding or clotting disorders   Administer blood products/factors as ordered

## 2024-12-19 NOTE — CARE COORDINATION
CM reviewed pt's medical record and discussed in IDR. CM in to see pt to discuss discharge plan. Pt plans to go home alone with the help of waiver services. Pt is a current HD patient. [employer] information filled out for transportation home.   Pt returned from ultrasound.

## 2024-12-20 LAB
ACTIVATED CLOTTING TIME, LOW RANGE: 175 SEC (ref 89–169)
ACTIVATED CLOTTING TIME, LOW RANGE: 194 SEC (ref 89–169)
ACTIVATED CLOTTING TIME, LOW RANGE: 194 SEC (ref 89–169)
ACTIVATED CLOTTING TIME, LOW RANGE: 234 SEC (ref 89–169)
ACTIVATED CLOTTING TIME, LOW RANGE: 284 SEC (ref 89–169)
ACTIVATED CLOTTING TIME, LOW RANGE: 297 SEC (ref 89–169)
MICROORGANISM SPEC CULT: NORMAL
SPECIMEN DESCRIPTION: NORMAL

## 2024-12-20 NOTE — ADT AUTH CERT
Utilization Reviews       12/17    Last updated by Moira Mireles, RN on 12/20/2024 0800     Review Status Created By   In Primary Moira Mireles RN       Review Type Associated Date   Continued Stay 12/20/2024      Criteria Review   DATE: 12/17  TYPE OF BED: intermed     RELEVANT BASELINES:  CAD: PCI to LAD(9/30/21) and LCX (8/24/22)      PERTINENT UPDATES:  No cp  Cont to co lle pain  Tolerated HD today     VITALS:  97.8 (36.6) 17 61 117/73   Sao2 94 w o2 3l      ABNL/PERTINENT LABS/RADIOLOGY/DIAGNOSTIC STUDIES:    Latest Reference Range & Units 12/17/24 08:00   Sodium 136 - 145 mmol/L 138   Potassium 3.5 - 5.1 mmol/L 4.6   Chloride 99 - 110 mmol/L 96 (L)   CARBON DIOXIDE 21 - 32 mmol/L 25   BUN,BUNPL 7 - 20 mg/dL 70 (H)   Creatinine 0.9 - 1.3 mg/dL 7.4 (H)   Anion Gap 9 - 17 mmol/L 17   Est, Glom Filt Rate >60 mL/min/1.73m2 8 (L)   Glucose 74 - 99 mg/dL 119 (H)   Calcium 8.3 - 10.6 mg/dL 8.4        Latest Reference Range & Units 12/17/24 08:00   WBC 4.0 - 10.5 k/uL 6.7   RBC 4.60 - 6.20 m/uL 3.10 (L)   Hemoglobin Quant 13.5 - 18.0 g/dL 9.3 (L)   Hematocrit 42.0 - 52.0 % 29.3 (L)   MCV 78.0 - 100.0 fL 94.5   MCH 27.0 - 31.0 pg 30.0   MCHC 32.0 - 36.0 g/dL 31.7 (L)   MPV 7.5 - 11.1 fL 11.3 (H)   RDW 11.7 - 14.9 % 16.9 (H)   Platelet, Fluorescence 140 - 440 k/uL 108 (L)      PHYSICAL EXAM:  General: NAD, chronically ill-appearing  Eyes: EOMI  ENT: neck supple  Cardiovascular: Regular rate and rhythm, LUE fistula, bilateral lower extremities, warm with cap refill less than 2 seconds  Respiratory: Clear to auscultation, decreased in RLL, respirations even and unlabored on 3L NC  Gastrointestinal: Soft, non tender  Genitourinary: no suprapubic tenderness  Musculoskeletal: trace BLE edema  Skin: warm, dry  Neuro: Alert.  Psych: Mood appropriate.        MD CONSULTS/ASSESSMENT AND PLAN:  Nephrology -   RLL pneumonia  -Chest pain and hx of CAD: ended up having 2 coronary stents placed on 12/16/24  -ESRD on HD    Left ventriculography: LVEDP was 28 mmHg     Coronary intervention details: Decision was made to engage intervene upon LAD.  An XB 3 5 guide was used.  Vessel was initially cannulated with RFR wire.  RFR was noted to be 0.78.  We then heparinized the patient and used a run-through wire.  We then predilated the lesion using a 3 mm x 12 mm noncompliant balloon in the proximal LAD.  Subsequently a 3.5 x 26 mm stent was implanted and postdilated using a 3.5 mm noncompliant balloon at 16 serg.  Afterwards we performed RFR again and was still 0.84 we therefore decided to intervene upon this LAD lesion.  This was predilated using a 2.75 mm balloon followed by 2.75 x 18 mm drug-eluting stent.  There is some to area of diffuse disease between mid LAD and distal LAD stents which remain unchanged compared to baseline angiogram.     Arteriotomy closure: Manual pressure     Complications: None        Implant Name Type Inv. Item Serial No.  Lot No. LRB No. Used Action   STENT CORONARY TIFFANY FRONTIER RX 3.5X26 MM ZOTAROLIMUS ELUT - [id number] Coronary stents STENT CORONARY TIFFANY FRONTIER RX 3.5X26 MM ZOTAROLIMUS ELUT   [employer]WD   N/A 1 Implanted   STENT CORONARY TIFFANY FRONTIER RX 2.75X18 MM ZOTAROLIMUS ELUT - [id number] Coronary stents STENT CORONARY TIFFANY FRONTIER RX 2.75X18 MM ZOTAROLIMUS ELUT   [employer]WD [id number] N/A 1 Implanted               Pre KELLY score: 3  Post KELLY score: 3  Preintervention stenosis proximal LAD: 70%  Postintervention stenosis proximal LAD: 0%  Preintervention stenosis distal LAD: 80%  Postintervention stenosis distal LAD: 0%     Conclusion:     Successful PCI of proximal LAD and distal LAD utilizing drug-eluting stents as above.  Subtotal occlusion of mid to distal OM with collaterals from right to left circulation.  Moderate to severe lesion in distal PDA.     Recommendations:     Bedrest for 5 hours  Right femoral artery sheath removal per protocol with ACT

## 2024-12-26 ENCOUNTER — HOSPITAL ENCOUNTER (EMERGENCY)
Age: 49
Discharge: HOME | End: 2024-12-26
Payer: MEDICARE

## 2024-12-26 ENCOUNTER — APPOINTMENT (OUTPATIENT)
Dept: GENERAL RADIOLOGY | Age: 49
End: 2024-12-26
Payer: MEDICARE

## 2024-12-26 VITALS
DIASTOLIC BLOOD PRESSURE: 114 MMHG | RESPIRATION RATE: 16 BRPM | HEART RATE: 92 BPM | OXYGEN SATURATION: 99 % | SYSTOLIC BLOOD PRESSURE: 187 MMHG | TEMPERATURE: 98.1 F

## 2024-12-26 DIAGNOSIS — S90.851A FOREIGN BODY IN RIGHT FOOT, INITIAL ENCOUNTER: ICD-10-CM

## 2024-12-26 DIAGNOSIS — S91.331A PUNCTURE WOUND OF RIGHT FOOT, INITIAL ENCOUNTER: Primary | ICD-10-CM

## 2024-12-26 PROCEDURE — 6370000000 HC RX 637 (ALT 250 FOR IP): Performed by: PHYSICIAN ASSISTANT

## 2024-12-26 PROCEDURE — 99283 EMERGENCY DEPT VISIT LOW MDM: CPT

## 2024-12-26 PROCEDURE — 73630 X-RAY EXAM OF FOOT: CPT

## 2024-12-26 RX ORDER — TRAMADOL HYDROCHLORIDE 50 MG/1
50 TABLET ORAL ONCE
Status: COMPLETED | OUTPATIENT
Start: 2024-12-26 | End: 2024-12-26

## 2024-12-26 RX ORDER — GINSENG 100 MG
CAPSULE ORAL ONCE
Status: COMPLETED | OUTPATIENT
Start: 2024-12-26 | End: 2024-12-26

## 2024-12-26 RX ORDER — HYDRALAZINE HYDROCHLORIDE 25 MG/1
25 TABLET, FILM COATED ORAL ONCE
Status: COMPLETED | OUTPATIENT
Start: 2024-12-26 | End: 2024-12-26

## 2024-12-26 RX ORDER — CEPHALEXIN 500 MG/1
500 CAPSULE ORAL 4 TIMES DAILY
Qty: 28 CAPSULE | Refills: 0 | Status: SHIPPED | OUTPATIENT
Start: 2024-12-26 | End: 2025-01-02

## 2024-12-26 RX ORDER — LEVOFLOXACIN 500 MG/1
500 TABLET, FILM COATED ORAL ONCE
Status: COMPLETED | OUTPATIENT
Start: 2024-12-26 | End: 2024-12-26

## 2024-12-26 RX ORDER — LEVOFLOXACIN 750 MG/1
750 TABLET, FILM COATED ORAL DAILY
Qty: 5 TABLET | Refills: 0 | Status: SHIPPED | OUTPATIENT
Start: 2024-12-26 | End: 2024-12-31

## 2024-12-26 RX ADMIN — LEVOFLOXACIN 500 MG: 500 TABLET, FILM COATED ORAL at 20:52

## 2024-12-26 RX ADMIN — BACITRACIN 1 EACH: 500 OINTMENT TOPICAL at 20:53

## 2024-12-26 RX ADMIN — HYDRALAZINE HYDROCHLORIDE 25 MG: 25 TABLET ORAL at 21:07

## 2024-12-26 RX ADMIN — TRAMADOL HYDROCHLORIDE 50 MG: 50 TABLET ORAL at 20:53

## 2024-12-26 ASSESSMENT — PAIN SCALES - GENERAL
PAINLEVEL_OUTOF10: 7
PAINLEVEL_OUTOF10: 7

## 2024-12-26 ASSESSMENT — PAIN - FUNCTIONAL ASSESSMENT: PAIN_FUNCTIONAL_ASSESSMENT: 0-10

## 2024-12-27 NOTE — ED PROVIDER NOTES
Triage Chief Complaint:   Fall (Pt was sitting in a chair that collapsed) and Laceration (Stepped on glass and bottom of foot is hurting)    [place]:  Today in the ED I had the pleasure of caring for Yovanny Levine who is a 49 y.o. male that presents today to the ED for puncture wound in the foot.  Context this patient stepped on a glass piece last night.  Since then has had been having pain in his foot.  He is diabetic does have a history of peripheral neuropathy and history of per referral arterial disease, history of multiple toe amputations secondary to diabetic ulcerations.  Denies any fevers or chills nausea vomiting or diarrhea.  Pain is ranked 2/10.  Tetanus shot is up-to-date.    ROS:  REVIEW OF SYSTEMS    At least 10 systems reviewed      All other review of systems are negative  See HPI and nursing notes for additional information       Past Medical History:   Diagnosis Date    Abscess 2010    scrotal    Acute renal failure (ARF) ([facility]) 08/04/2019    Anxiety associated with depression     Anxiety associated with depression     Back pain 07/02/2012    CAD (coronary artery disease) 02/10/2023    Chest pain 05/01/2013    Diabetic nephropathy ([facility])     Diabetic neuropathy ([facility]) 12/22/2011    Diabetic ulcer of left midfoot associated with type 2 diabetes mellitus, with fat layer exposed ([facility]) 07/18/2017    Diverticulosis     C scope + Dr. Medina    DM (diabetes mellitus), type 2 ([facility]) 2002    DR. 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 Medina    Nausea and vomiting 01/11/2019    Necrotizing fasciitis     Nose fracture 1988    Panic attacks     Pericarditis 2003    Hospitalized with s/p heart cath normal    Peripheral autonomic neuropathy due to diabetes mellitus (HCC)     Axonal EMG- NCS, March

## 2024-12-30 ENCOUNTER — HOSPITAL ENCOUNTER (OUTPATIENT)
Dept: WOUND CARE | Age: 49
Discharge: HOME | End: 2024-12-30
Attending: STUDENT IN AN ORGANIZED HEALTH CARE EDUCATION/TRAINING PROGRAM
Payer: MEDICARE

## 2024-12-30 VITALS
RESPIRATION RATE: 18 BRPM | SYSTOLIC BLOOD PRESSURE: 176 MMHG | HEART RATE: 93 BPM | TEMPERATURE: 98.4 F | DIASTOLIC BLOOD PRESSURE: 71 MMHG

## 2024-12-30 DIAGNOSIS — E11.621 TYPE 2 DIABETES MELLITUS WITH FOOT ULCER, WITH LONG-TERM CURRENT USE OF INSULIN (HCC): ICD-10-CM

## 2024-12-30 DIAGNOSIS — E11.621 DIABETIC ULCER OF LEFT MIDFOOT ASSOCIATED WITH TYPE 2 DIABETES MELLITUS, WITH FAT LAYER EXPOSED (HCC): ICD-10-CM

## 2024-12-30 DIAGNOSIS — E11.621 DIABETIC ULCER OF RIGHT MIDFOOT ASSOCIATED WITH TYPE 2 DIABETES MELLITUS, WITH FAT LAYER EXPOSED (HCC): Primary | ICD-10-CM

## 2024-12-30 DIAGNOSIS — L97.412 DIABETIC ULCER OF RIGHT MIDFOOT ASSOCIATED WITH TYPE 2 DIABETES MELLITUS, WITH FAT LAYER EXPOSED (HCC): Primary | ICD-10-CM

## 2024-12-30 DIAGNOSIS — Z89.422 ABSENCE OF TOE OF LEFT FOOT (HCC): ICD-10-CM

## 2024-12-30 DIAGNOSIS — L97.912 TRAUMATIC LEG ULCER, RIGHT, WITH FAT LAYER EXPOSED (HCC): ICD-10-CM

## 2024-12-30 DIAGNOSIS — N18.6 ESRD (END STAGE RENAL DISEASE) ON DIALYSIS (HCC): ICD-10-CM

## 2024-12-30 DIAGNOSIS — M86.9 DIABETIC FOOT ULCER WITH OSTEOMYELITIS (HCC): ICD-10-CM

## 2024-12-30 DIAGNOSIS — G89.4 CHRONIC PAIN SYNDROME: ICD-10-CM

## 2024-12-30 DIAGNOSIS — L97.509 TYPE 2 DIABETES MELLITUS WITH FOOT ULCER, WITH LONG-TERM CURRENT USE OF INSULIN (HCC): ICD-10-CM

## 2024-12-30 DIAGNOSIS — L97.422 DIABETIC ULCER OF LEFT MIDFOOT ASSOCIATED WITH TYPE 2 DIABETES MELLITUS, WITH FAT LAYER EXPOSED (HCC): ICD-10-CM

## 2024-12-30 DIAGNOSIS — Z99.2 ESRD (END STAGE RENAL DISEASE) ON DIALYSIS (HCC): ICD-10-CM

## 2024-12-30 DIAGNOSIS — E11.621 DIABETIC FOOT ULCER WITH OSTEOMYELITIS (HCC): ICD-10-CM

## 2024-12-30 DIAGNOSIS — Z79.4 TYPE 2 DIABETES MELLITUS WITH FOOT ULCER, WITH LONG-TERM CURRENT USE OF INSULIN (HCC): ICD-10-CM

## 2024-12-30 DIAGNOSIS — L97.509 DIABETIC FOOT ULCER WITH OSTEOMYELITIS (HCC): ICD-10-CM

## 2024-12-30 DIAGNOSIS — E11.69 DIABETIC FOOT ULCER WITH OSTEOMYELITIS (HCC): ICD-10-CM

## 2024-12-30 DIAGNOSIS — L60.2 LONG TOENAIL: ICD-10-CM

## 2024-12-30 PROCEDURE — 97597 DBRDMT OPN WND 1ST 20 CM/<: CPT

## 2024-12-30 PROCEDURE — 11042 DBRDMT SUBQ TIS 1ST 20SQCM/<: CPT | Performed by: NURSE PRACTITIONER

## 2024-12-30 PROCEDURE — 11042 DBRDMT SUBQ TIS 1ST 20SQCM/<: CPT

## 2024-12-30 PROCEDURE — 11719 TRIM NAIL(S) ANY NUMBER: CPT

## 2024-12-30 PROCEDURE — 97597 DBRDMT OPN WND 1ST 20 CM/<: CPT | Performed by: NURSE PRACTITIONER

## 2024-12-30 PROCEDURE — 6370000000 HC RX 637 (ALT 250 FOR IP): Performed by: SURGERY

## 2024-12-30 PROCEDURE — 11719 TRIM NAIL(S) ANY NUMBER: CPT | Performed by: NURSE PRACTITIONER

## 2024-12-30 RX ORDER — CLOBETASOL PROPIONATE 0.5 MG/G
OINTMENT TOPICAL ONCE
OUTPATIENT
Start: 2024-12-30 | End: 2024-12-30

## 2024-12-30 RX ORDER — SILVER SULFADIAZINE 10 MG/G
CREAM TOPICAL ONCE
OUTPATIENT
Start: 2024-12-30 | End: 2024-12-30

## 2024-12-30 RX ORDER — LIDOCAINE HYDROCHLORIDE 20 MG/ML
JELLY TOPICAL ONCE
OUTPATIENT
Start: 2024-12-30 | End: 2024-12-30

## 2024-12-30 RX ORDER — OXYCODONE AND ACETAMINOPHEN 7.5; 325 MG/1; MG/1
1 TABLET ORAL EVERY 8 HOURS PRN
Qty: 42 TABLET | Refills: 0 | Status: SHIPPED | OUTPATIENT
Start: 2024-12-30 | End: 2025-01-13

## 2024-12-30 RX ORDER — LIDOCAINE HYDROCHLORIDE 40 MG/ML
SOLUTION TOPICAL ONCE
OUTPATIENT
Start: 2024-12-30 | End: 2024-12-30

## 2024-12-30 RX ORDER — MUPIROCIN 20 MG/G
OINTMENT TOPICAL ONCE
OUTPATIENT
Start: 2024-12-30 | End: 2024-12-30

## 2024-12-30 RX ORDER — BETAMETHASONE DIPROPIONATE 0.5 MG/G
CREAM TOPICAL ONCE
OUTPATIENT
Start: 2024-12-30 | End: 2024-12-30

## 2024-12-30 RX ORDER — LIDOCAINE 40 MG/G
CREAM TOPICAL ONCE
OUTPATIENT
Start: 2024-12-30 | End: 2024-12-30

## 2024-12-30 RX ORDER — BACITRACIN ZINC AND POLYMYXIN B SULFATE 500; 1000 [USP'U]/G; [USP'U]/G
OINTMENT TOPICAL ONCE
OUTPATIENT
Start: 2024-12-30 | End: 2024-12-30

## 2024-12-30 RX ORDER — GINSENG 100 MG
CAPSULE ORAL ONCE
OUTPATIENT
Start: 2024-12-30 | End: 2024-12-30

## 2024-12-30 RX ORDER — TRIAMCINOLONE ACETONIDE 1 MG/G
OINTMENT TOPICAL ONCE
OUTPATIENT
Start: 2024-12-30 | End: 2024-12-30

## 2024-12-30 RX ORDER — LIDOCAINE 50 MG/G
OINTMENT TOPICAL ONCE
OUTPATIENT
Start: 2024-12-30 | End: 2024-12-30

## 2024-12-30 RX ORDER — GENTAMICIN SULFATE 1 MG/G
OINTMENT TOPICAL ONCE
OUTPATIENT
Start: 2024-12-30 | End: 2024-12-30

## 2024-12-30 RX ORDER — GENTAMICIN SULFATE 1 MG/G
OINTMENT TOPICAL ONCE
Status: COMPLETED | OUTPATIENT
Start: 2024-12-30 | End: 2024-12-30

## 2024-12-30 RX ORDER — NEOMYCIN/BACITRACIN/POLYMYXINB 3.5-400-5K
OINTMENT (GRAM) TOPICAL ONCE
OUTPATIENT
Start: 2024-12-30 | End: 2024-12-30

## 2024-12-30 RX ORDER — SODIUM CHLOR/HYPOCHLOROUS ACID 0.033 %
SOLUTION, IRRIGATION IRRIGATION ONCE
OUTPATIENT
Start: 2024-12-30 | End: 2024-12-30

## 2024-12-30 RX ADMIN — GENTAMICIN SULFATE: 1 OINTMENT TOPICAL at 12:08

## 2024-12-30 ASSESSMENT — PAIN DESCRIPTION - ORIENTATION: ORIENTATION: LEFT

## 2024-12-30 ASSESSMENT — PAIN DESCRIPTION - LOCATION: LOCATION: FOOT;LEG

## 2024-12-30 ASSESSMENT — PAIN SCALES - GENERAL: PAINLEVEL_OUTOF10: 8

## 2024-12-30 ASSESSMENT — PAIN DESCRIPTION - FREQUENCY: FREQUENCY: CONTINUOUS

## 2024-12-30 ASSESSMENT — PAIN DESCRIPTION - DESCRIPTORS: DESCRIPTORS: SHARP

## 2024-12-30 NOTE — PROGRESS NOTES
Multilayer Compression Wrap   (Not Unna) Below the Knee    NAME:  Yovanny Levien  YOB: 1975  MEDICAL RECORD NUMBER:  [id number]  DATE:  12/30/2024    Multilayer compression wrap: Removed old Multilayer wrap if indicated and wash leg with mild soap/water.  Applied moisturizing agent to dry skin as needed.   Applied primary and secondary dressing as ordered.  Applied multilayered dressing below the knee to right lower leg.  Instructed patient/caregiver not to remove dressing and to keep it clean and dry.   Instructed patient/caregiver on complications to report to provider, such as pain, numbness in toes, heavy drainage, and slippage of dressing.  Instructed patient on purpose of compression dressing and on activity and exercise recommendations.      Electronically signed by LYNSEY CASTILLO LPN on 12/30/2024 at 12:08 PM  
and catsup). Limit even lower sodium versions of soy sauce and teriyaki sauce-treat these condiments just like salt). In cooking and at the table, flavor foods with herbs, spices, lemon, lime, vinegar or salt-free seasoning blends. Start by cutting salt in half. Cook rice, pasta and hot cereals without salt. Cut back on instant or flavored rice, pasta and cereal mixes, which usually have added salt. Choose “convenience” foods that are lower in sodium. Limit frozen dinners, packaged mixes, canned soups and dressings. Rinse canned foods, such as tuna, to remove some sodium. Choose fruits or vegetables instead of salty snack foods.    Edema Management   Whenever resting, raise your legs up. Try to keep the swollen area higher than the level of your heart. Take breaks from standing or sitting in one position. Walk around to increase the blood flow in your lower legs. Move your feet and ankles often while you stand, or tighten and relax your leg muscles. Wear support stockings. Put them on in the morning, before swelling gets worse.  Eat a balanced diet. Lose weight if you need to. Limit the amount of salt (sodium) in your diet. Salt holds fluid in the body and may increase swelling. Apply compression stocking(s) every morning as soon as you get up. Remove at bedtime unless instructed to wear day and night. Hand wash and line dry to prevent loss of elasticity. Replace every 3-4 months to ensure proper fit.     Weight Management   Will need to ultimately change overall eating behaviors to have success with weight loss. Encouraged to weigh daily and work towards a goal of 1-2 pounds of weight loss weekly. Encouraged to journal all food intake, [employer] pal is a useful tool to help keep track of food intake and caloric value.  Keep calorie level at approximately 7619-7745. Protein intake is to be a minimum of 60 grams per day (unless otherwise directed). Water drinking was encouraged with a goal of 64oz-128oz daily.

## 2024-12-30 NOTE — PATIENT INSTRUCTIONS
PHYSICIAN ORDERS AND DISCHARGE INSTRUCTIONS     Wound cleansing:              Do not scrub or use excessive force.             Wash hands with soap and water before and after dressing changes.             Prior to applying a clean dressing, cleanse wound with normal saline,               wound cleanser, or mild soap and water.              Ask the physician or nurse before getting the wound(s) wet in a shower                      Wound Care Notes:  Sees Dr Kumar.                Applied for Kerecis grafts on 24  Donated Kerecis applied to left lateral foot 24                             Orders for this week: 2024    Right plantar : Wash with soap and water, pat dry.  Paint toes with betadine.  Gentamicin and stimulen powder  to wound bed   Apply whole qwick to periwound sec  Cover with ABD pads.  Wrap with coban 2 full  Leave in place for 1 week     Right knee:   Wash with soap and water, pat dry.  Apply Gentamicin, stimulento wounds  Cover Right knee with Gentac     Leave in place for 1 week     Leave in place til next visit to [facility]    Plan: HBO workup started 9/10/24.   [facility] discharge due to not home bound 24.   Toenails 24. Called [facility] about abx. No answer 10/28/24       Follow Up Instructions: 1 weeks  Primary Wound Care Provider: Shelly Rubalcava CNP  Call  for any questions or concerns.  Central [phone] for imaging lab work

## 2025-01-02 LAB — GLUCOSE BLD-MCNC: 248 MG/DL (ref 74–99)

## 2025-01-06 ENCOUNTER — HOSPITAL ENCOUNTER (OUTPATIENT)
Dept: WOUND CARE | Age: 50
Discharge: HOME | End: 2025-01-06
Attending: STUDENT IN AN ORGANIZED HEALTH CARE EDUCATION/TRAINING PROGRAM

## 2025-01-06 NOTE — PATIENT INSTRUCTIONS
PHYSICIAN ORDERS AND DISCHARGE INSTRUCTIONS     Wound cleansing:              Do not scrub or use excessive force.             Wash hands with soap and water before and after dressing changes.             Prior to applying a clean dressing, cleanse wound with normal saline,               wound cleanser, or mild soap and water.              Ask the physician or nurse before getting the wound(s) wet in a shower                      Wound Care Notes:  Sees Dr Kumar.                Applied for Kerecis grafts on 24  Donated Kerecis applied to left lateral foot 24                             Orders for this week: 2025    Right plantar : Wash with soap and water, pat dry.  Paint toes with betadine.  Gentamicin and stimulen powder  to wound bed   Apply whole qwick to periwound sec  Cover with ABD pads.  Wrap with coban 2 full  Leave in place for 1 week     Right knee:   Wash with soap and water, pat dry.  Apply Gentamicin, stimulento wounds  Cover Right knee with Gentac     Leave in place for 1 week     Leave in place til next visit to Wound Care Center    Plan: HBO workup started 9/10/24.   [facility] discharge due to not home bound 24.   Toenails 24. Called [facility] about abx. No answer 10/28/24       Follow Up Instructions: 1 weeks  Primary Wound Care Provider: Shelly Rubalcava CNP  Call  for any questions or concerns.  Central [phone] for imaging lab work

## 2025-01-07 ENCOUNTER — HOSPITAL ENCOUNTER (INPATIENT)
Age: 50
LOS: 2 days | Discharge: HOME | DRG: 640 | End: 2025-01-09
Attending: STUDENT IN AN ORGANIZED HEALTH CARE EDUCATION/TRAINING PROGRAM | Admitting: STUDENT IN AN ORGANIZED HEALTH CARE EDUCATION/TRAINING PROGRAM
Payer: MEDICARE

## 2025-01-07 ENCOUNTER — APPOINTMENT (OUTPATIENT)
Dept: GENERAL RADIOLOGY | Age: 50
DRG: 640 | End: 2025-01-07
Payer: MEDICARE

## 2025-01-07 DIAGNOSIS — I50.20 HFREF (HEART FAILURE WITH REDUCED EJECTION FRACTION) (HCC): Primary | ICD-10-CM

## 2025-01-07 DIAGNOSIS — Z99.2 ESRD (END STAGE RENAL DISEASE) ON DIALYSIS (HCC): ICD-10-CM

## 2025-01-07 DIAGNOSIS — R06.00 DYSPNEA, UNSPECIFIED TYPE: ICD-10-CM

## 2025-01-07 DIAGNOSIS — N18.6 ESRD (END STAGE RENAL DISEASE) ON DIALYSIS (HCC): ICD-10-CM

## 2025-01-07 DIAGNOSIS — L97.412 DIABETIC ULCER OF RIGHT MIDFOOT ASSOCIATED WITH TYPE 2 DIABETES MELLITUS, WITH FAT LAYER EXPOSED (HCC): ICD-10-CM

## 2025-01-07 DIAGNOSIS — E11.621 DIABETIC ULCER OF RIGHT MIDFOOT ASSOCIATED WITH TYPE 2 DIABETES MELLITUS, WITH FAT LAYER EXPOSED (HCC): ICD-10-CM

## 2025-01-07 PROBLEM — N28.9 HYPERVOLEMIA ASSOCIATED WITH RENAL INSUFFICIENCY: Status: ACTIVE | Noted: 2025-01-07

## 2025-01-07 PROBLEM — E87.70 HYPERVOLEMIA ASSOCIATED WITH RENAL INSUFFICIENCY: Status: ACTIVE | Noted: 2025-01-07

## 2025-01-07 LAB
ALBUMIN SERPL-MCNC: 3.8 G/DL (ref 3.4–5)
ALBUMIN SERPL-MCNC: 4 G/DL (ref 3.4–5)
ALBUMIN/GLOB SERPL: 1.1 {RATIO} (ref 1.1–2.2)
ALBUMIN/GLOB SERPL: 1.1 {RATIO} (ref 1.1–2.2)
ALP SERPL-CCNC: 88 U/L (ref 40–129)
ALP SERPL-CCNC: 91 U/L (ref 40–129)
ALT SERPL-CCNC: 7 U/L (ref 10–40)
ALT SERPL-CCNC: 7 U/L (ref 10–40)
ANION GAP SERPL CALCULATED.3IONS-SCNC: 24 MMOL/L (ref 9–17)
ANION GAP SERPL CALCULATED.3IONS-SCNC: 25 MMOL/L (ref 9–17)
AST SERPL-CCNC: 14 U/L (ref 15–37)
AST SERPL-CCNC: 23 U/L (ref 15–37)
B PARAP IS1001 DNA NPH QL NAA+NON-PROBE: NOT DETECTED
B PERT DNA SPEC QL NAA+PROBE: NOT DETECTED
BACTERIA URNS QL MICRO: ABNORMAL
BASOPHILS # BLD: 0.02 K/UL
BASOPHILS # BLD: 0.04 K/UL
BASOPHILS NFR BLD: 0 % (ref 0–1)
BASOPHILS NFR BLD: 1 % (ref 0–1)
BILIRUB SERPL-MCNC: 0.6 MG/DL (ref 0–1)
BILIRUB SERPL-MCNC: 0.7 MG/DL (ref 0–1)
BILIRUB UR QL STRIP: NEGATIVE
BNP SERPL-MCNC: ABNORMAL PG/ML (ref 0–125)
BUN SERPL-MCNC: 114 MG/DL (ref 7–20)
BUN SERPL-MCNC: 116 MG/DL (ref 7–20)
C PNEUM DNA NPH QL NAA+NON-PROBE: NOT DETECTED
CALCIUM SERPL-MCNC: 7.4 MG/DL (ref 8.3–10.6)
CALCIUM SERPL-MCNC: 7.5 MG/DL (ref 8.3–10.6)
CHLORIDE SERPL-SCNC: 92 MMOL/L (ref 99–110)
CHLORIDE SERPL-SCNC: 93 MMOL/L (ref 99–110)
CHP ED QC CHECK: YES
CLARITY UR: CLEAR
CO2 SERPL-SCNC: 17 MMOL/L (ref 21–32)
CO2 SERPL-SCNC: 21 MMOL/L (ref 21–32)
COLOR UR: YELLOW
CREAT SERPL-MCNC: 11.6 MG/DL (ref 0.9–1.3)
CREAT SERPL-MCNC: 11.7 MG/DL (ref 0.9–1.3)
EOSINOPHIL # BLD: 0.19 K/UL
EOSINOPHIL # BLD: 0.2 K/UL
EOSINOPHILS RELATIVE PERCENT: 3 % (ref 0–3)
EOSINOPHILS RELATIVE PERCENT: 3 % (ref 0–3)
EPI CELLS #/AREA URNS HPF: <1 /HPF
ERYTHROCYTE [DISTWIDTH] IN BLOOD BY AUTOMATED COUNT: 17.3 % (ref 11.7–14.9)
ERYTHROCYTE [DISTWIDTH] IN BLOOD BY AUTOMATED COUNT: 17.6 % (ref 11.7–14.9)
FLUAV RNA NPH QL NAA+NON-PROBE: NOT DETECTED
FLUBV RNA NPH QL NAA+NON-PROBE: NOT DETECTED
GFR, ESTIMATED: 5 ML/MIN/1.73M2
GFR, ESTIMATED: 5 ML/MIN/1.73M2
GLUCOSE BLD-MCNC: 124 MG/DL (ref 74–99)
GLUCOSE BLD-MCNC: 140 MG/DL
GLUCOSE BLD-MCNC: 140 MG/DL (ref 74–99)
GLUCOSE BLD-MCNC: 182 MG/DL (ref 74–99)
GLUCOSE BLD-MCNC: 210 MG/DL (ref 74–99)
GLUCOSE SERPL-MCNC: 147 MG/DL (ref 74–99)
GLUCOSE SERPL-MCNC: 151 MG/DL (ref 74–99)
GLUCOSE UR STRIP-MCNC: 250 MG/DL
HADV DNA NPH QL NAA+NON-PROBE: NOT DETECTED
HBV SURFACE AB SERPL IA-ACNC: 3.5 MIU/ML
HBV SURFACE AG SERPL QL IA: NONREACTIVE
HCOV 229E RNA NPH QL NAA+NON-PROBE: NOT DETECTED
HCOV HKU1 RNA NPH QL NAA+NON-PROBE: NOT DETECTED
HCOV NL63 RNA NPH QL NAA+NON-PROBE: NOT DETECTED
HCOV OC43 RNA NPH QL NAA+NON-PROBE: NOT DETECTED
HCT VFR BLD AUTO: 30.6 % (ref 42–52)
HCT VFR BLD AUTO: 31.2 % (ref 42–52)
HGB BLD-MCNC: 10 G/DL (ref 13.5–18)
HGB BLD-MCNC: 10 G/DL (ref 13.5–18)
HGB UR QL STRIP.AUTO: ABNORMAL
HMPV RNA NPH QL NAA+NON-PROBE: NOT DETECTED
HPIV1 RNA NPH QL NAA+NON-PROBE: NOT DETECTED
HPIV2 RNA NPH QL NAA+NON-PROBE: NOT DETECTED
HPIV3 RNA NPH QL NAA+NON-PROBE: NOT DETECTED
HPIV4 RNA NPH QL NAA+NON-PROBE: NOT DETECTED
IMM GRANULOCYTES # BLD AUTO: 0.02 K/UL
IMM GRANULOCYTES # BLD AUTO: 0.03 K/UL
IMM GRANULOCYTES NFR BLD: 0 %
IMM GRANULOCYTES NFR BLD: 0 %
INR PPP: 1.4
KETONES UR STRIP-MCNC: NEGATIVE MG/DL
LEUKOCYTE ESTERASE UR QL STRIP: NEGATIVE
LYMPHOCYTES NFR BLD: 0.51 K/UL
LYMPHOCYTES NFR BLD: 0.53 K/UL
LYMPHOCYTES RELATIVE PERCENT: 7 % (ref 24–44)
LYMPHOCYTES RELATIVE PERCENT: 8 % (ref 24–44)
M PNEUMO DNA NPH QL NAA+NON-PROBE: NOT DETECTED
MAGNESIUM SERPL-MCNC: 2.4 MG/DL (ref 1.8–2.4)
MCH RBC QN AUTO: 30.2 PG (ref 27–31)
MCH RBC QN AUTO: 30.2 PG (ref 27–31)
MCHC RBC AUTO-ENTMCNC: 32.1 G/DL (ref 32–36)
MCHC RBC AUTO-ENTMCNC: 32.7 G/DL (ref 32–36)
MCV RBC AUTO: 92.4 FL (ref 78–100)
MCV RBC AUTO: 94.3 FL (ref 78–100)
MICROORGANISM SPEC CULT: NORMAL
MONOCYTES NFR BLD: 0.58 K/UL
MONOCYTES NFR BLD: 0.71 K/UL
MONOCYTES NFR BLD: 10 % (ref 0–4)
MONOCYTES NFR BLD: 9 % (ref 0–4)
NEUTROPHILS NFR BLD: 79 % (ref 36–66)
NEUTROPHILS NFR BLD: 80 % (ref 36–66)
NEUTS SEG NFR BLD: 5.44 K/UL
NEUTS SEG NFR BLD: 5.63 K/UL
NITRITE UR QL STRIP: NEGATIVE
PH UR STRIP: 6.5 [PH] (ref 5–8)
PLATELET # BLD AUTO: 141 K/UL (ref 140–440)
PLATELET # BLD AUTO: 144 K/UL (ref 140–440)
PMV BLD AUTO: 10.3 FL (ref 7.5–11.1)
PMV BLD AUTO: 10.5 FL (ref 7.5–11.1)
POTASSIUM SERPL-SCNC: 5.5 MMOL/L (ref 3.5–5.1)
POTASSIUM SERPL-SCNC: 6.2 MMOL/L (ref 3.5–5.1)
PROT SERPL-MCNC: 7.4 G/DL (ref 6.4–8.2)
PROT SERPL-MCNC: 7.5 G/DL (ref 6.4–8.2)
PROT UR STRIP-MCNC: >=300 MG/DL
PROTHROMBIN TIME: 17.4 SEC (ref 11.7–14.5)
RBC # BLD AUTO: 3.31 M/UL (ref 4.6–6.2)
RBC # BLD AUTO: 3.31 M/UL (ref 4.6–6.2)
RBC #/AREA URNS HPF: 1 /HPF (ref 0–2)
RSV RNA NPH QL NAA+NON-PROBE: NOT DETECTED
RV+EV RNA NPH QL NAA+NON-PROBE: NOT DETECTED
SARS-COV-2 RNA NPH QL NAA+NON-PROBE: NOT DETECTED
SERVICE CMNT-IMP: NORMAL
SODIUM SERPL-SCNC: 135 MMOL/L (ref 136–145)
SODIUM SERPL-SCNC: 137 MMOL/L (ref 136–145)
SP GR UR STRIP: 1.02 (ref 1–1.03)
SPECIMEN DESCRIPTION: NORMAL
SPECIMEN DESCRIPTION: NORMAL
UROBILINOGEN UR STRIP-ACNC: 0.2 EU/DL (ref 0–1)
WBC #/AREA URNS HPF: 2 /HPF (ref 0–5)
WBC OTHER # BLD: 6.8 K/UL (ref 4–10.5)
WBC OTHER # BLD: 7.1 K/UL (ref 4–10.5)

## 2025-01-07 PROCEDURE — 2140000000 HC CCU INTERMEDIATE R&B

## 2025-01-07 PROCEDURE — 2700000000 HC OXYGEN THERAPY PER DAY

## 2025-01-07 PROCEDURE — 2500000003 HC RX 250 WO HCPCS: Performed by: STUDENT IN AN ORGANIZED HEALTH CARE EDUCATION/TRAINING PROGRAM

## 2025-01-07 PROCEDURE — 85025 COMPLETE CBC W/AUTO DIFF WBC: CPT

## 2025-01-07 PROCEDURE — 6370000000 HC RX 637 (ALT 250 FOR IP): Performed by: STUDENT IN AN ORGANIZED HEALTH CARE EDUCATION/TRAINING PROGRAM

## 2025-01-07 PROCEDURE — 80053 COMPREHEN METABOLIC PANEL: CPT

## 2025-01-07 PROCEDURE — 73620 X-RAY EXAM OF FOOT: CPT

## 2025-01-07 PROCEDURE — 86317 IMMUNOASSAY INFECTIOUS AGENT: CPT

## 2025-01-07 PROCEDURE — 94761 N-INVAS EAR/PLS OXIMETRY MLT: CPT

## 2025-01-07 PROCEDURE — 83735 ASSAY OF MAGNESIUM: CPT

## 2025-01-07 PROCEDURE — 82962 GLUCOSE BLOOD TEST: CPT

## 2025-01-07 PROCEDURE — 6370000000 HC RX 637 (ALT 250 FOR IP): Performed by: FAMILY MEDICINE

## 2025-01-07 PROCEDURE — 71045 X-RAY EXAM CHEST 1 VIEW: CPT

## 2025-01-07 PROCEDURE — 81001 URINALYSIS AUTO W/SCOPE: CPT

## 2025-01-07 PROCEDURE — 6360000002 HC RX W HCPCS: Performed by: STUDENT IN AN ORGANIZED HEALTH CARE EDUCATION/TRAINING PROGRAM

## 2025-01-07 PROCEDURE — 87086 URINE CULTURE/COLONY COUNT: CPT

## 2025-01-07 PROCEDURE — 83880 ASSAY OF NATRIURETIC PEPTIDE: CPT

## 2025-01-07 PROCEDURE — 99285 EMERGENCY DEPT VISIT HI MDM: CPT

## 2025-01-07 PROCEDURE — 0202U NFCT DS 22 TRGT SARS-COV-2: CPT

## 2025-01-07 PROCEDURE — 85610 PROTHROMBIN TIME: CPT

## 2025-01-07 PROCEDURE — 87340 HEPATITIS B SURFACE AG IA: CPT

## 2025-01-07 RX ORDER — DEXTROSE MONOHYDRATE 100 MG/ML
INJECTION, SOLUTION INTRAVENOUS CONTINUOUS PRN
Status: DISCONTINUED | OUTPATIENT
Start: 2025-01-07 | End: 2025-01-09 | Stop reason: HOSPADM

## 2025-01-07 RX ORDER — HYDRALAZINE HYDROCHLORIDE 25 MG/1
25 TABLET, FILM COATED ORAL EVERY 8 HOURS SCHEDULED
Status: DISCONTINUED | OUTPATIENT
Start: 2025-01-07 | End: 2025-01-07

## 2025-01-07 RX ORDER — FUROSEMIDE 10 MG/ML
100 INJECTION INTRAMUSCULAR; INTRAVENOUS ONCE
Status: COMPLETED | OUTPATIENT
Start: 2025-01-07 | End: 2025-01-07

## 2025-01-07 RX ORDER — CALCIUM CARBONATE 500 MG/1
2 TABLET, CHEWABLE ORAL DAILY
Status: DISCONTINUED | OUTPATIENT
Start: 2025-01-07 | End: 2025-01-09 | Stop reason: HOSPADM

## 2025-01-07 RX ORDER — GLUCAGON 1 MG/ML
1 KIT INJECTION PRN
Status: DISCONTINUED | OUTPATIENT
Start: 2025-01-07 | End: 2025-01-09 | Stop reason: HOSPADM

## 2025-01-07 RX ORDER — ONDANSETRON 2 MG/ML
4 INJECTION INTRAMUSCULAR; INTRAVENOUS EVERY 6 HOURS PRN
Status: DISCONTINUED | OUTPATIENT
Start: 2025-01-07 | End: 2025-01-09 | Stop reason: HOSPADM

## 2025-01-07 RX ORDER — HYDRALAZINE HYDROCHLORIDE 25 MG/1
50 TABLET, FILM COATED ORAL EVERY 8 HOURS SCHEDULED
Status: DISCONTINUED | OUTPATIENT
Start: 2025-01-07 | End: 2025-01-09 | Stop reason: HOSPADM

## 2025-01-07 RX ORDER — ALBUTEROL SULFATE 90 UG/1
2 INHALANT RESPIRATORY (INHALATION) 4 TIMES DAILY PRN
Status: DISCONTINUED | OUTPATIENT
Start: 2025-01-07 | End: 2025-01-09 | Stop reason: HOSPADM

## 2025-01-07 RX ORDER — POLYETHYLENE GLYCOL 3350 17 G/17G
17 POWDER, FOR SOLUTION ORAL DAILY PRN
Status: DISCONTINUED | OUTPATIENT
Start: 2025-01-07 | End: 2025-01-09 | Stop reason: HOSPADM

## 2025-01-07 RX ORDER — CLOPIDOGREL BISULFATE 75 MG/1
75 TABLET ORAL DAILY
Status: DISCONTINUED | OUTPATIENT
Start: 2025-01-07 | End: 2025-01-09 | Stop reason: HOSPADM

## 2025-01-07 RX ORDER — ONDANSETRON 4 MG/1
4 TABLET, ORALLY DISINTEGRATING ORAL EVERY 8 HOURS PRN
Status: DISCONTINUED | OUTPATIENT
Start: 2025-01-07 | End: 2025-01-09 | Stop reason: HOSPADM

## 2025-01-07 RX ORDER — SODIUM CHLORIDE 9 MG/ML
INJECTION, SOLUTION INTRAVENOUS PRN
Status: DISCONTINUED | OUTPATIENT
Start: 2025-01-07 | End: 2025-01-09 | Stop reason: HOSPADM

## 2025-01-07 RX ORDER — ACETAMINOPHEN 325 MG/1
650 TABLET ORAL EVERY 6 HOURS PRN
Status: DISCONTINUED | OUTPATIENT
Start: 2025-01-07 | End: 2025-01-09 | Stop reason: HOSPADM

## 2025-01-07 RX ORDER — OXYCODONE AND ACETAMINOPHEN 7.5; 325 MG/1; MG/1
1 TABLET ORAL EVERY 8 HOURS PRN
Status: DISCONTINUED | OUTPATIENT
Start: 2025-01-07 | End: 2025-01-09 | Stop reason: HOSPADM

## 2025-01-07 RX ORDER — ISOSORBIDE MONONITRATE 30 MG/1
30 TABLET, EXTENDED RELEASE ORAL DAILY
Status: DISCONTINUED | OUTPATIENT
Start: 2025-01-07 | End: 2025-01-09 | Stop reason: HOSPADM

## 2025-01-07 RX ORDER — METOPROLOL SUCCINATE 25 MG/1
25 TABLET, EXTENDED RELEASE ORAL DAILY
Status: DISCONTINUED | OUTPATIENT
Start: 2025-01-07 | End: 2025-01-08

## 2025-01-07 RX ORDER — ASPIRIN 81 MG/1
81 TABLET, CHEWABLE ORAL DAILY
Status: DISCONTINUED | OUTPATIENT
Start: 2025-01-07 | End: 2025-01-09 | Stop reason: HOSPADM

## 2025-01-07 RX ORDER — INSULIN GLARGINE 100 [IU]/ML
7 INJECTION, SOLUTION SUBCUTANEOUS NIGHTLY
Status: DISCONTINUED | OUTPATIENT
Start: 2025-01-07 | End: 2025-01-09 | Stop reason: HOSPADM

## 2025-01-07 RX ORDER — ATORVASTATIN CALCIUM 40 MG/1
40 TABLET, FILM COATED ORAL NIGHTLY
Status: DISCONTINUED | OUTPATIENT
Start: 2025-01-07 | End: 2025-01-09 | Stop reason: HOSPADM

## 2025-01-07 RX ORDER — ACETAMINOPHEN 650 MG/1
650 SUPPOSITORY RECTAL EVERY 6 HOURS PRN
Status: DISCONTINUED | OUTPATIENT
Start: 2025-01-07 | End: 2025-01-09 | Stop reason: HOSPADM

## 2025-01-07 RX ORDER — LABETALOL HYDROCHLORIDE 5 MG/ML
10 INJECTION, SOLUTION INTRAVENOUS ONCE
Status: DISCONTINUED | OUTPATIENT
Start: 2025-01-07 | End: 2025-01-08

## 2025-01-07 RX ORDER — INSULIN LISPRO 100 [IU]/ML
0-4 INJECTION, SOLUTION INTRAVENOUS; SUBCUTANEOUS
Status: DISCONTINUED | OUTPATIENT
Start: 2025-01-07 | End: 2025-01-09 | Stop reason: HOSPADM

## 2025-01-07 RX ORDER — SODIUM CHLORIDE 0.9 % (FLUSH) 0.9 %
5-40 SYRINGE (ML) INJECTION EVERY 12 HOURS SCHEDULED
Status: DISCONTINUED | OUTPATIENT
Start: 2025-01-07 | End: 2025-01-09 | Stop reason: HOSPADM

## 2025-01-07 RX ORDER — INSULIN LISPRO 100 [IU]/ML
0-4 INJECTION, SOLUTION INTRAVENOUS; SUBCUTANEOUS EVERY 4 HOURS
Status: DISCONTINUED | OUTPATIENT
Start: 2025-01-07 | End: 2025-01-08

## 2025-01-07 RX ORDER — CALCITRIOL 0.5 UG/1
0.5 CAPSULE, LIQUID FILLED ORAL 2 TIMES DAILY
Status: DISCONTINUED | OUTPATIENT
Start: 2025-01-07 | End: 2025-01-09 | Stop reason: HOSPADM

## 2025-01-07 RX ORDER — SODIUM CHLORIDE 0.9 % (FLUSH) 0.9 %
5-40 SYRINGE (ML) INJECTION PRN
Status: DISCONTINUED | OUTPATIENT
Start: 2025-01-07 | End: 2025-01-09 | Stop reason: HOSPADM

## 2025-01-07 RX ORDER — HEPARIN SODIUM 5000 [USP'U]/ML
5000 INJECTION, SOLUTION INTRAVENOUS; SUBCUTANEOUS EVERY 8 HOURS SCHEDULED
Status: DISCONTINUED | OUTPATIENT
Start: 2025-01-07 | End: 2025-01-09 | Stop reason: HOSPADM

## 2025-01-07 RX ADMIN — INSULIN LISPRO 1 UNITS: 100 INJECTION, SOLUTION INTRAVENOUS; SUBCUTANEOUS at 20:16

## 2025-01-07 RX ADMIN — ASPIRIN 81 MG: 81 TABLET, CHEWABLE ORAL at 12:34

## 2025-01-07 RX ADMIN — SODIUM CHLORIDE, PRESERVATIVE FREE 10 ML: 5 INJECTION INTRAVENOUS at 12:35

## 2025-01-07 RX ADMIN — INSULIN GLARGINE 7 UNITS: 100 INJECTION, SOLUTION SUBCUTANEOUS at 20:17

## 2025-01-07 RX ADMIN — OXYCODONE AND ACETAMINOPHEN 1 TABLET: 7.5; 325 TABLET ORAL at 12:34

## 2025-01-07 RX ADMIN — HYDRALAZINE HYDROCHLORIDE 50 MG: 25 TABLET ORAL at 20:18

## 2025-01-07 RX ADMIN — METOPROLOL SUCCINATE 25 MG: 25 TABLET, EXTENDED RELEASE ORAL at 12:34

## 2025-01-07 RX ADMIN — INSULIN LISPRO 1 UNITS: 100 INJECTION, SOLUTION INTRAVENOUS; SUBCUTANEOUS at 16:46

## 2025-01-07 RX ADMIN — HYDRALAZINE HYDROCHLORIDE 25 MG: 25 TABLET ORAL at 06:04

## 2025-01-07 RX ADMIN — ISOSORBIDE MONONITRATE 30 MG: 30 TABLET, EXTENDED RELEASE ORAL at 12:33

## 2025-01-07 RX ADMIN — HEPARIN SODIUM 5000 UNITS: 5000 INJECTION INTRAVENOUS; SUBCUTANEOUS at 14:18

## 2025-01-07 RX ADMIN — SODIUM CHLORIDE, PRESERVATIVE FREE 10 ML: 5 INJECTION INTRAVENOUS at 20:15

## 2025-01-07 RX ADMIN — HEPARIN SODIUM 5000 UNITS: 5000 INJECTION INTRAVENOUS; SUBCUTANEOUS at 20:16

## 2025-01-07 RX ADMIN — OXYCODONE AND ACETAMINOPHEN 1 TABLET: 7.5; 325 TABLET ORAL at 21:11

## 2025-01-07 RX ADMIN — FUROSEMIDE 100 MG: 10 INJECTION, SOLUTION INTRAMUSCULAR; INTRAVENOUS at 03:16

## 2025-01-07 RX ADMIN — CLOPIDOGREL BISULFATE 75 MG: 75 TABLET ORAL at 12:34

## 2025-01-07 RX ADMIN — HYDRALAZINE HYDROCHLORIDE 25 MG: 25 TABLET ORAL at 12:34

## 2025-01-07 RX ADMIN — ATORVASTATIN CALCIUM 40 MG: 40 TABLET, FILM COATED ORAL at 20:18

## 2025-01-07 RX ADMIN — OXYCODONE AND ACETAMINOPHEN 1 TABLET: 7.5; 325 TABLET ORAL at 03:17

## 2025-01-07 RX ADMIN — CALCITRIOL 0.5 MCG: 0.5 CAPSULE, LIQUID FILLED ORAL at 22:24

## 2025-01-07 RX ADMIN — HEPARIN SODIUM 5000 UNITS: 5000 INJECTION INTRAVENOUS; SUBCUTANEOUS at 06:04

## 2025-01-07 ASSESSMENT — PAIN DESCRIPTION - ORIENTATION
ORIENTATION: RIGHT
ORIENTATION: RIGHT;MID;LOWER

## 2025-01-07 ASSESSMENT — PAIN SCALES - GENERAL
PAINLEVEL_OUTOF10: 2
PAINLEVEL_OUTOF10: 2
PAINLEVEL_OUTOF10: 6
PAINLEVEL_OUTOF10: 3
PAINLEVEL_OUTOF10: 0
PAINLEVEL_OUTOF10: 0
PAINLEVEL_OUTOF10: 7
PAINLEVEL_OUTOF10: 7

## 2025-01-07 ASSESSMENT — PAIN DESCRIPTION - LOCATION
LOCATION: BACK;FOOT
LOCATION: FOOT;BACK

## 2025-01-07 ASSESSMENT — PAIN DESCRIPTION - DESCRIPTORS
DESCRIPTORS: STABBING
DESCRIPTORS: SHARP

## 2025-01-07 ASSESSMENT — PAIN - FUNCTIONAL ASSESSMENT
PAIN_FUNCTIONAL_ASSESSMENT: ACTIVITIES ARE NOT PREVENTED
PAIN_FUNCTIONAL_ASSESSMENT: 0-10

## 2025-01-07 ASSESSMENT — PAIN SCALES - WONG BAKER
WONGBAKER_NUMERICALRESPONSE: NO HURT
WONGBAKER_NUMERICALRESPONSE: NO HURT

## 2025-01-07 NOTE — ED NOTES
K6.2, Tere Kingston notified as on call for Luis Enrique via perfect serve. Per Tere, message received and Nephrology should be in to see patient shortly.

## 2025-01-07 NOTE — CONSULTS
[facility] Nurse  Consult Note       Yovanny Levine  AGE: 49 y.o.   GENDER: male  : 1975  TODAY'S DATE:  2025    Subjective:     Reason for  Evaluation and Assessment: wound care eval.      Yovanny Levine is a 49 y.o. male referred by:   [x] Physician  [] Nursing  [] Other:     Wound Identification:  Wound Type: diabetic  Contributing Factors: diabetes, chronic pressure, and obesity        PAST MEDICAL HISTORY        Diagnosis Date    Abscess 2010    scrotal    Acute renal failure (ARF) ([facility]) 2019    Anxiety associated with depression     Anxiety associated with depression     Back pain 2012    CAD (coronary artery disease) 02/10/2023    Chest pain 2013    Diabetic nephropathy ([facility])     Diabetic neuropathy ([facility]) 2011    Diabetic ulcer of left midfoot associated with type 2 diabetes mellitus, with fat layer exposed ([facility]) 2017    Diverticulosis     C scope + Dr. Medina    DM (diabetes mellitus), type 2 ([facility])     DR. 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 Medina    Nausea and vomiting 2019    Necrotizing fasciitis     Nose fracture 1988    Panic attacks     Pericarditis     Hospitalized with s/p heart cath normal    Peripheral autonomic neuropathy due to diabetes mellitus ([facility])     Axonal EMG- NCS, 2011    Sebaceous cyst 2011    URI (upper respiratory infection) 2012    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 type 2 diabetes mellitus, with muscle involvement without evidence of necrosis ([facility]) 2021    WD-Wound, surgical, infected, initial encounter 2017    Wrist fracture 1986       PAST SURGICAL HISTORY    Past Surgical History:   Procedure Laterality Date    ABDOMEN SURGERY      
appropriate     CBC:   Recent Labs     01/07/25 0052 01/07/25  0503   WBC 6.8 7.1   HGB 10.0* 10.0*    144     BMP:    Recent Labs     01/07/25 0052 01/07/25  0503 01/07/25  0509    135*  --    K 5.5* 6.2*  --    CL 92* 93*  --    CO2 21 17*  --    * 116*  --    CREATININE 11.6* 11.7*  --    GLUCOSE 151* 147* 140     Hepatic:   Recent Labs     01/07/25 0052 01/07/25  0503   AST 14* 23   ALT 7* 7*   BILITOT 0.7 0.6   ALKPHOS 91 88     Troponin: No results for input(s): \"TROPONINI\" in the last 72 hours.  BNP: No results for input(s): \"BNP\" in the last 72 hours.  No intake/output data recorded.         Electronically signed by Melody James DO on 1/7/2025 at 7:59 AM    [facility]  MD SAMANTHA SMITH DO  [address] [facility],  [address] [place] [address]  PHONE: [phone]  FAX: [phone]

## 2025-01-07 NOTE — ED PROVIDER NOTES
EKG per my interpretation demonstrates normal sinus rhythm at a rate of 81 bpm.  Normal axis.  Prolonged QTc interval 485 ms.  No acute ST segment changes.     Shaheen Lennon,   01/07/25 0126    
   UPPER GASTROINTESTINAL ENDOSCOPY  06/02/2011    Mild gastritis with moderatley severe antritis, small hiatal hernia, Dr. Medina    UPPER GASTROINTESTINAL ENDOSCOPY N/A 01/12/2019    EGD BIOPSY performed by Jose Maria Cornelius MD at [facility]    UPPER GASTROINTESTINAL ENDOSCOPY N/A 07/26/2022    EGD DIAGNOSTIC ONLY performed by Jose Maria Cornelius MD at [facility]    UPPER GASTROINTESTINAL ENDOSCOPY N/A 9/4/2023    EGD BIOPSY performed by Mia AMBRIZ MD at [facility]       CURRENTMEDICATIONS       Previous Medications    ALBUTEROL SULFATE HFA (VENTOLIN HFA) 108 (90 BASE) MCG/ACT INHALER    Inhale 2 puffs into the lungs 4 times daily as needed for Wheezing    ASPIRIN 81 MG CHEWABLE TABLET    Take 1 tablet by mouth daily    ATORVASTATIN (LIPITOR) 40 MG TABLET    Take 1 tablet by mouth nightly    CALCITRIOL (ROCALTROL) 0.5 MCG CAPSULE    Take 1 capsule by mouth 2 times daily    CALCIUM CARBONATE (TUMS) 500 MG CHEWABLE TABLET    Take 2 tablets by mouth daily    CLOPIDOGREL (PLAVIX) 75 MG TABLET    Take 1 tablet by mouth daily    GLUCOSE 4 G CHEWABLE TABLET    Take 4 tablets by mouth as needed for Low blood sugar    GLUCOSE MONITORING KIT (FREESTYLE) MONITORING KIT    1 each by Does not apply route once for 1 dose.    HYDRALAZINE (APRESOLINE) 25 MG TABLET    Take 1 tablet by mouth every 8 hours    INSULIN GLARGINE (LANTUS SOLOSTAR) 100 UNIT/ML INJECTION PEN    Inject 10 Units into the skin nightly    INSULIN LISPRO (HUMALOG,ADMELOG) 100 UNIT/ML SOLN INJECTION VIAL    Inject into the skin 3 times daily (with meals) Sliding scale insulin    ISOSORBIDE MONONITRATE (IMDUR) 30 MG EXTENDED RELEASE TABLET    Take 1 tablet by mouth daily    LANCETS MISC    1 each by Does not apply route daily    METOPROLOL SUCCINATE (TOPROL XL) 25 MG EXTENDED RELEASE TABLET    Take 1 tablet by mouth daily    OXYCODONE-ACETAMINOPHEN (PERCOCET) 7.5-325 MG PER TABLET    Take 1 tablet by mouth every 8 hours as needed for Pain for up to 14 days.

## 2025-01-07 NOTE — ED NOTES
ED TO INPATIENT SBAR HANDOFF    Patient Name: Yovanny Levine   :  1975  49 y.o.   Preferred Name  Yovanyn  Family/Caregiver Present no   Restraints no   C-SSRS: Risk of Suicide: No Risk  Sitter no   Sepsis Risk Score        Situation  Chief Complaint   Patient presents with    Shortness of Breath     Missed dialysis today d/t weather. No ride. Wears 3L n/c at all times.      Brief Description of Patient's Condition: 49 y.o. male who presents for evaluation of increasing shortness of breath after missing dialysis today.  Patient states that due to the severe weather, his ambulance ride was not able to pick him up.  He did contact the dialysis center but there are no chair times available for tomorrow and he shortness of breath progressed throughout the day.  He has noticed some increased peripheral edema and increased abdominal edema.  Last dialysis was 4 days ago as he typically goes Monday/Wednesday/Friday.  He has also had some mild URI symptoms over the last couple of days with cough and sore throat.  He is on 3 L of oxygen continuously at home and has not had any increased O2 demand.  Denies any fevers or chills, chest pain, abdominal pain.  Has had some mild nausea but no vomiting or diarrhea.  He does still produce a small amount of urine but is not on any diuretics at home.  Nephrologist is Dr. Parmar.   Mental Status: oriented, alert, and coherent  Arrived from: home    Imaging:   XR FOOT RIGHT (2 VIEWS)   Final Result      XR CHEST PORTABLE   Final Result        Abnormal labs:   Abnormal Labs Reviewed   CBC WITH AUTO DIFFERENTIAL - Abnormal; Notable for the following components:       Result Value    RBC 3.31 (*)     Hemoglobin 10.0 (*)     Hematocrit 30.6 (*)     RDW 17.3 (*)     Neutrophils % 80 (*)     Lymphocytes % 8 (*)     Monocytes % 9 (*)     All other components within normal limits   COMPREHENSIVE METABOLIC PANEL - Abnormal; Notable for the following components:    Potassium 5.5 (*)

## 2025-01-08 LAB
ALBUMIN SERPL-MCNC: 3.6 G/DL (ref 3.4–5)
ALBUMIN/GLOB SERPL: 1.1 {RATIO} (ref 1.1–2.2)
ALP SERPL-CCNC: 87 U/L (ref 40–129)
ALT SERPL-CCNC: 5 U/L (ref 10–40)
ANION GAP SERPL CALCULATED.3IONS-SCNC: 19 MMOL/L (ref 9–17)
AST SERPL-CCNC: 15 U/L (ref 15–37)
BASOPHILS # BLD: 0.07 K/UL
BASOPHILS NFR BLD: 2 % (ref 0–1)
BILIRUB SERPL-MCNC: 0.6 MG/DL (ref 0–1)
BUN SERPL-MCNC: 81 MG/DL (ref 7–20)
CALCIUM SERPL-MCNC: 8.1 MG/DL (ref 8.3–10.6)
CHLORIDE SERPL-SCNC: 96 MMOL/L (ref 99–110)
CO2 SERPL-SCNC: 22 MMOL/L (ref 21–32)
CREAT SERPL-MCNC: 9 MG/DL (ref 0.9–1.3)
EOSINOPHIL # BLD: 0.17 K/UL
EOSINOPHILS RELATIVE PERCENT: 4 % (ref 0–3)
ERYTHROCYTE [DISTWIDTH] IN BLOOD BY AUTOMATED COUNT: 17.8 % (ref 11.7–14.9)
GFR, ESTIMATED: 6 ML/MIN/1.73M2
GLUCOSE BLD-MCNC: 109 MG/DL (ref 74–99)
GLUCOSE BLD-MCNC: 131 MG/DL (ref 74–99)
GLUCOSE BLD-MCNC: 139 MG/DL (ref 74–99)
GLUCOSE BLD-MCNC: 169 MG/DL (ref 74–99)
GLUCOSE BLD-MCNC: 187 MG/DL (ref 74–99)
GLUCOSE BLD-MCNC: 241 MG/DL (ref 74–99)
GLUCOSE SERPL-MCNC: 99 MG/DL (ref 74–99)
HCT VFR BLD AUTO: 30.6 % (ref 42–52)
HGB BLD-MCNC: 9.8 G/DL (ref 13.5–18)
IMM GRANULOCYTES # BLD AUTO: 0.02 K/UL
IMM GRANULOCYTES NFR BLD: 0 %
LYMPHOCYTES NFR BLD: 0.41 K/UL
LYMPHOCYTES RELATIVE PERCENT: 9 % (ref 24–44)
MCH RBC QN AUTO: 30.4 PG (ref 27–31)
MCHC RBC AUTO-ENTMCNC: 32 G/DL (ref 32–36)
MCV RBC AUTO: 95 FL (ref 78–100)
MONOCYTES NFR BLD: 0.54 K/UL
MONOCYTES NFR BLD: 11 % (ref 0–4)
NEUTROPHILS NFR BLD: 75 % (ref 36–66)
NEUTS SEG NFR BLD: 3.58 K/UL
PLATELET # BLD AUTO: 130 K/UL (ref 140–440)
PMV BLD AUTO: 10.4 FL (ref 7.5–11.1)
POTASSIUM SERPL-SCNC: 4.9 MMOL/L (ref 3.5–5.1)
PROT SERPL-MCNC: 6.8 G/DL (ref 6.4–8.2)
RBC # BLD AUTO: 3.22 M/UL (ref 4.6–6.2)
SODIUM SERPL-SCNC: 137 MMOL/L (ref 136–145)
WBC OTHER # BLD: 4.8 K/UL (ref 4–10.5)

## 2025-01-08 PROCEDURE — 2500000003 HC RX 250 WO HCPCS: Performed by: STUDENT IN AN ORGANIZED HEALTH CARE EDUCATION/TRAINING PROGRAM

## 2025-01-08 PROCEDURE — 6370000000 HC RX 637 (ALT 250 FOR IP): Performed by: FAMILY MEDICINE

## 2025-01-08 PROCEDURE — 94761 N-INVAS EAR/PLS OXIMETRY MLT: CPT

## 2025-01-08 PROCEDURE — 6370000000 HC RX 637 (ALT 250 FOR IP): Performed by: STUDENT IN AN ORGANIZED HEALTH CARE EDUCATION/TRAINING PROGRAM

## 2025-01-08 PROCEDURE — 2140000000 HC CCU INTERMEDIATE R&B

## 2025-01-08 PROCEDURE — 36415 COLL VENOUS BLD VENIPUNCTURE: CPT

## 2025-01-08 PROCEDURE — 82962 GLUCOSE BLOOD TEST: CPT

## 2025-01-08 PROCEDURE — 6360000002 HC RX W HCPCS: Performed by: STUDENT IN AN ORGANIZED HEALTH CARE EDUCATION/TRAINING PROGRAM

## 2025-01-08 PROCEDURE — 80053 COMPREHEN METABOLIC PANEL: CPT

## 2025-01-08 PROCEDURE — 85025 COMPLETE CBC W/AUTO DIFF WBC: CPT

## 2025-01-08 PROCEDURE — 5A1D70Z PERFORMANCE OF URINARY FILTRATION, INTERMITTENT, LESS THAN 6 HOURS PER DAY: ICD-10-PCS | Performed by: STUDENT IN AN ORGANIZED HEALTH CARE EDUCATION/TRAINING PROGRAM

## 2025-01-08 PROCEDURE — 90935 HEMODIALYSIS ONE EVALUATION: CPT

## 2025-01-08 PROCEDURE — 2700000000 HC OXYGEN THERAPY PER DAY

## 2025-01-08 RX ORDER — METOPROLOL SUCCINATE 50 MG/1
50 TABLET, EXTENDED RELEASE ORAL DAILY
Status: DISCONTINUED | OUTPATIENT
Start: 2025-01-08 | End: 2025-01-09 | Stop reason: HOSPADM

## 2025-01-08 RX ADMIN — HYDRALAZINE HYDROCHLORIDE 50 MG: 25 TABLET ORAL at 06:08

## 2025-01-08 RX ADMIN — METOPROLOL SUCCINATE 50 MG: 50 TABLET, EXTENDED RELEASE ORAL at 13:16

## 2025-01-08 RX ADMIN — HYDRALAZINE HYDROCHLORIDE 50 MG: 25 TABLET ORAL at 21:43

## 2025-01-08 RX ADMIN — CALCIUM CARBONATE 1000 MG: 500 TABLET, CHEWABLE ORAL at 13:16

## 2025-01-08 RX ADMIN — SODIUM CHLORIDE, PRESERVATIVE FREE 10 ML: 5 INJECTION INTRAVENOUS at 21:43

## 2025-01-08 RX ADMIN — CLOPIDOGREL BISULFATE 75 MG: 75 TABLET ORAL at 13:16

## 2025-01-08 RX ADMIN — OXYCODONE AND ACETAMINOPHEN 1 TABLET: 7.5; 325 TABLET ORAL at 21:45

## 2025-01-08 RX ADMIN — INSULIN LISPRO 1 UNITS: 100 INJECTION, SOLUTION INTRAVENOUS; SUBCUTANEOUS at 17:36

## 2025-01-08 RX ADMIN — HYDRALAZINE HYDROCHLORIDE 50 MG: 25 TABLET ORAL at 15:53

## 2025-01-08 RX ADMIN — ASPIRIN 81 MG: 81 TABLET, CHEWABLE ORAL at 13:16

## 2025-01-08 RX ADMIN — SODIUM CHLORIDE, PRESERVATIVE FREE 10 ML: 5 INJECTION INTRAVENOUS at 13:18

## 2025-01-08 RX ADMIN — HEPARIN SODIUM 5000 UNITS: 5000 INJECTION INTRAVENOUS; SUBCUTANEOUS at 06:09

## 2025-01-08 RX ADMIN — INSULIN GLARGINE 7 UNITS: 100 INJECTION, SOLUTION SUBCUTANEOUS at 21:42

## 2025-01-08 RX ADMIN — HEPARIN SODIUM 5000 UNITS: 5000 INJECTION INTRAVENOUS; SUBCUTANEOUS at 15:53

## 2025-01-08 RX ADMIN — OXYCODONE AND ACETAMINOPHEN 1 TABLET: 7.5; 325 TABLET ORAL at 13:16

## 2025-01-08 RX ADMIN — CALCITRIOL 0.5 MCG: 0.5 CAPSULE, LIQUID FILLED ORAL at 23:18

## 2025-01-08 RX ADMIN — HEPARIN SODIUM 5000 UNITS: 5000 INJECTION INTRAVENOUS; SUBCUTANEOUS at 21:42

## 2025-01-08 RX ADMIN — INSULIN LISPRO 1 UNITS: 100 INJECTION, SOLUTION INTRAVENOUS; SUBCUTANEOUS at 21:42

## 2025-01-08 RX ADMIN — ISOSORBIDE MONONITRATE 30 MG: 30 TABLET, EXTENDED RELEASE ORAL at 13:17

## 2025-01-08 RX ADMIN — ATORVASTATIN CALCIUM 40 MG: 40 TABLET, FILM COATED ORAL at 21:43

## 2025-01-08 ASSESSMENT — PAIN SCALES - GENERAL
PAINLEVEL_OUTOF10: 7
PAINLEVEL_OUTOF10: 5

## 2025-01-09 VITALS
SYSTOLIC BLOOD PRESSURE: 159 MMHG | HEIGHT: 69 IN | HEART RATE: 64 BPM | DIASTOLIC BLOOD PRESSURE: 90 MMHG | BODY MASS INDEX: 38.24 KG/M2 | OXYGEN SATURATION: 95 % | WEIGHT: 258.16 LBS | TEMPERATURE: 98.1 F | RESPIRATION RATE: 19 BRPM

## 2025-01-09 LAB
ALBUMIN SERPL-MCNC: 3.4 G/DL (ref 3.4–5)
ALBUMIN/GLOB SERPL: 1.2 {RATIO} (ref 1.1–2.2)
ALP SERPL-CCNC: 77 U/L (ref 40–129)
ALT SERPL-CCNC: <5 U/L (ref 10–40)
ANION GAP SERPL CALCULATED.3IONS-SCNC: 17 MMOL/L (ref 9–17)
AST SERPL-CCNC: 13 U/L (ref 15–37)
BASOPHILS # BLD: 0.04 K/UL
BASOPHILS NFR BLD: 1 % (ref 0–1)
BILIRUB SERPL-MCNC: 0.5 MG/DL (ref 0–1)
BUN SERPL-MCNC: 66 MG/DL (ref 7–20)
CALCIUM SERPL-MCNC: 8.2 MG/DL (ref 8.3–10.6)
CHLORIDE SERPL-SCNC: 99 MMOL/L (ref 99–110)
CO2 SERPL-SCNC: 24 MMOL/L (ref 21–32)
CREAT SERPL-MCNC: 7.8 MG/DL (ref 0.9–1.3)
EOSINOPHIL # BLD: 0.21 K/UL
EOSINOPHILS RELATIVE PERCENT: 4 % (ref 0–3)
ERYTHROCYTE [DISTWIDTH] IN BLOOD BY AUTOMATED COUNT: 17.6 % (ref 11.7–14.9)
GFR, ESTIMATED: 7 ML/MIN/1.73M2
GLUCOSE BLD-MCNC: 113 MG/DL (ref 74–99)
GLUCOSE BLD-MCNC: 157 MG/DL (ref 74–99)
GLUCOSE BLD-MCNC: 192 MG/DL (ref 74–99)
GLUCOSE SERPL-MCNC: 168 MG/DL (ref 74–99)
HCT VFR BLD AUTO: 29.6 % (ref 42–52)
HGB BLD-MCNC: 9.6 G/DL (ref 13.5–18)
IMM GRANULOCYTES # BLD AUTO: 0.01 K/UL
IMM GRANULOCYTES NFR BLD: 0 %
LYMPHOCYTES NFR BLD: 0.51 K/UL
LYMPHOCYTES RELATIVE PERCENT: 9 % (ref 24–44)
MCH RBC QN AUTO: 30.7 PG (ref 27–31)
MCHC RBC AUTO-ENTMCNC: 32.4 G/DL (ref 32–36)
MCV RBC AUTO: 94.6 FL (ref 78–100)
MONOCYTES NFR BLD: 0.69 K/UL
MONOCYTES NFR BLD: 13 % (ref 0–4)
NEUTROPHILS NFR BLD: 74 % (ref 36–66)
NEUTS SEG NFR BLD: 4.07 K/UL
PLATELET # BLD AUTO: 120 K/UL (ref 140–440)
PMV BLD AUTO: 10.4 FL (ref 7.5–11.1)
POTASSIUM SERPL-SCNC: 4.4 MMOL/L (ref 3.5–5.1)
PROT SERPL-MCNC: 6.3 G/DL (ref 6.4–8.2)
RBC # BLD AUTO: 3.13 M/UL (ref 4.6–6.2)
SODIUM SERPL-SCNC: 140 MMOL/L (ref 136–145)
WBC OTHER # BLD: 5.5 K/UL (ref 4–10.5)

## 2025-01-09 PROCEDURE — 6370000000 HC RX 637 (ALT 250 FOR IP): Performed by: FAMILY MEDICINE

## 2025-01-09 PROCEDURE — 2700000000 HC OXYGEN THERAPY PER DAY

## 2025-01-09 PROCEDURE — 6360000002 HC RX W HCPCS: Performed by: STUDENT IN AN ORGANIZED HEALTH CARE EDUCATION/TRAINING PROGRAM

## 2025-01-09 PROCEDURE — 94761 N-INVAS EAR/PLS OXIMETRY MLT: CPT

## 2025-01-09 PROCEDURE — 36415 COLL VENOUS BLD VENIPUNCTURE: CPT

## 2025-01-09 PROCEDURE — 85025 COMPLETE CBC W/AUTO DIFF WBC: CPT

## 2025-01-09 PROCEDURE — 6360000002 HC RX W HCPCS: Performed by: INTERNAL MEDICINE

## 2025-01-09 PROCEDURE — 82962 GLUCOSE BLOOD TEST: CPT

## 2025-01-09 PROCEDURE — 94618 PULMONARY STRESS TESTING: CPT

## 2025-01-09 PROCEDURE — 90935 HEMODIALYSIS ONE EVALUATION: CPT

## 2025-01-09 PROCEDURE — 80053 COMPREHEN METABOLIC PANEL: CPT

## 2025-01-09 PROCEDURE — 2500000003 HC RX 250 WO HCPCS: Performed by: STUDENT IN AN ORGANIZED HEALTH CARE EDUCATION/TRAINING PROGRAM

## 2025-01-09 PROCEDURE — 6370000000 HC RX 637 (ALT 250 FOR IP): Performed by: STUDENT IN AN ORGANIZED HEALTH CARE EDUCATION/TRAINING PROGRAM

## 2025-01-09 RX ORDER — HYDRALAZINE HYDROCHLORIDE 50 MG/1
50 TABLET, FILM COATED ORAL EVERY 8 HOURS SCHEDULED
Qty: 90 TABLET | Refills: 0 | Status: SHIPPED | OUTPATIENT
Start: 2025-01-09

## 2025-01-09 RX ORDER — HEPARIN SODIUM 1000 [USP'U]/ML
3400 INJECTION, SOLUTION INTRAVENOUS; SUBCUTANEOUS ONCE
Status: DISCONTINUED | OUTPATIENT
Start: 2025-01-09 | End: 2025-01-09

## 2025-01-09 RX ORDER — HEPARIN SODIUM 1000 [USP'U]/ML
3400 INJECTION, SOLUTION INTRAVENOUS; SUBCUTANEOUS
Status: COMPLETED | OUTPATIENT
Start: 2025-01-09 | End: 2025-01-09

## 2025-01-09 RX ORDER — METOPROLOL SUCCINATE 50 MG/1
50 TABLET, EXTENDED RELEASE ORAL DAILY
Qty: 30 TABLET | Refills: 0 | Status: SHIPPED | OUTPATIENT
Start: 2025-01-09

## 2025-01-09 RX ADMIN — ISOSORBIDE MONONITRATE 30 MG: 30 TABLET, EXTENDED RELEASE ORAL at 08:42

## 2025-01-09 RX ADMIN — ASPIRIN 81 MG: 81 TABLET, CHEWABLE ORAL at 08:42

## 2025-01-09 RX ADMIN — HEPARIN SODIUM 3400 UNITS: 1000 INJECTION INTRAVENOUS; SUBCUTANEOUS at 09:31

## 2025-01-09 RX ADMIN — OXYCODONE AND ACETAMINOPHEN 1 TABLET: 7.5; 325 TABLET ORAL at 14:04

## 2025-01-09 RX ADMIN — CALCITRIOL 0.5 MCG: 0.5 CAPSULE, LIQUID FILLED ORAL at 14:04

## 2025-01-09 RX ADMIN — CALCIUM CARBONATE 1000 MG: 500 TABLET, CHEWABLE ORAL at 08:42

## 2025-01-09 RX ADMIN — HYDRALAZINE HYDROCHLORIDE 50 MG: 25 TABLET ORAL at 14:04

## 2025-01-09 RX ADMIN — METOPROLOL SUCCINATE 50 MG: 50 TABLET, EXTENDED RELEASE ORAL at 08:42

## 2025-01-09 RX ADMIN — SODIUM CHLORIDE, PRESERVATIVE FREE 10 ML: 5 INJECTION INTRAVENOUS at 08:42

## 2025-01-09 RX ADMIN — HEPARIN SODIUM 5000 UNITS: 5000 INJECTION INTRAVENOUS; SUBCUTANEOUS at 06:45

## 2025-01-09 RX ADMIN — CLOPIDOGREL BISULFATE 75 MG: 75 TABLET ORAL at 08:42

## 2025-01-09 RX ADMIN — HYDRALAZINE HYDROCHLORIDE 50 MG: 25 TABLET ORAL at 05:33

## 2025-01-09 ASSESSMENT — PAIN DESCRIPTION - LOCATION: LOCATION: FOOT

## 2025-01-09 ASSESSMENT — PAIN DESCRIPTION - ORIENTATION: ORIENTATION: RIGHT

## 2025-01-09 ASSESSMENT — PAIN - FUNCTIONAL ASSESSMENT: PAIN_FUNCTIONAL_ASSESSMENT: ACTIVITIES ARE NOT PREVENTED

## 2025-01-09 ASSESSMENT — PAIN DESCRIPTION - DESCRIPTORS: DESCRIPTORS: ACHING

## 2025-01-09 ASSESSMENT — PAIN SCALES - GENERAL: PAINLEVEL_OUTOF10: 7

## 2025-01-09 NOTE — DIALYSIS
01/08/25 0527 01/09/25 0531   *  --  137 140   K 6.2*  --  4.9 4.4   CL 93*  --  96* 99   CO2 17*  --  22 24   *  --  81* 66*   CREATININE 11.7*  --  9.0* 7.8*   GLUCOSE 147* 140 99 168*     IV Drips and Rate/Dose   dextrose      sodium chloride        Safety - Before each treatment:   Dialysis Machine No.: [id number]   Machine Number: [id number]  Dialyzer Lot No.: [id number]  RO Machine Log Sheet Completed: Yes  Machine Alarm Self Test: Passed;Completed (01/09/25 0745)     Air Foam Detector: Tested, Proper Function, pH Reading  Extracorporeal Circuit Tested for Integrity: Yes  Machine Conductivity: 13.8  Manual Conductivity: 13.8     Bicarbonate Concentrate Lot No.: [id number]  Acid Concentrate Lot No.: [id number]  Manual Ph: 7.2  Bleach Test (Neg): Yes  Bath Temperature: 96.8 °F (36 °C)  Tubing Lot#: [id number]  Conductivity Meter Serial #: [id number]  All Connections Secure?: Yes  Venous Parameters Set?: Yes  Arterial Parameters Set?: Yes  Saline Line Double Clamped?: Yes  Air Foam Detector Engaged?: Yes  Machine Functioning Alarm Free? Yes  Prime Given: 200ml    Chlorine Testing - Before each treatment and every 4 hours:   Treatment  Time On: 0921  Time Off: 1325  Treatment Goal: 4hr 3.5L  Weight - Scale: 117.1 kg (258 lb 2.5 oz) (01/08/25 0236)  1st check: less than 0.1 ppm at: 0700 hours  2nd check: less than 0.1 ppm at: 1045 hours  3rd check: Not Applicable  (if greater than 0.1 ppm, then check every 30 minutes from secondary)    Access Flows and Pressures  Patient Vitals for the past 8 hrs:   Blood Flow Rate (ml/min) Ultrafiltration Rate (ml/hr) Ultrafiltration Removed (ml) Arterial Pressure (mmHg) Venous Pressure (mmHg) TMP DFR Comments Access Visible   01/09/25 0921 350 ml/min 1000 ml/hr 0 ml -130 mmHg 160 60 700 tx initiated Yes   01/09/25 0930 350 ml/min 1000 ml/hr 150 ml -130 mmHg 170 50 700 lines secure Yes   01/09/25 0945 350 ml/min 1000 ml/hr 512 ml -130 mmHg 180 60 700 pt resting Yes   01/09/25 1000 350

## 2025-01-09 NOTE — DISCHARGE INSTRUCTIONS
Internal Medicine Discharge Instruction    Discharge to:  Home  Diet: ADULT DIET; Regular; No Added Salt (3-4 gm); fluid restriction to 1200 ml in a 24 hour period  Activity: As tolerated       Be compliant with medications  Please take medications as prescribed       Electronically signed by Dean Mcclendon MD on 1/9/2025 at 8:32 AM

## 2025-01-09 NOTE — PROGRESS NOTES
1/9/2025 3:54 PM  Patient Room #: OBS 06/APX27-81  Patient Name: Yovanny Levine    (Step 1 Done by RN if possible otherwise call Pulmonary Diagnostics)  Place patient on room air at rest for at least 30 minutes.  If patient falls below 88% before 30 minutes then you can record the level and stop.  Record room air saturation level __ %.  If patient is at 88% or below, they will qualify for home oxygen and you can stop.  If level does not fall below 88%, fill in level above. If indicated continue to Step 2.   Signature:_____ Date: ___  (Step 2&3 Done by [facility])  Ambulate patient on room air until saturation falls below 89%.  Record level of room air saturation with ambulation___ %.  Next, place patient back on _2__lpm oxygen and ambulate, record level _91_%.  (Note:  this level must show improvement from room air level done with ambulation.)  If patient’s saturation on room air with ambulation is 88% or below AND patient shows improvement with oxygen during ambulation, they will qualify for home oxygen and you can stop.  If patient does not drop below 89%, then patient should have an overnight oximetry trending on room air to see if level falls below 88%.  Complete level in Step 3 below.    Room air overnight oximetry level 88 % for___  cumulative minutes.  If patient’s room air oxygen level is below <89% for any amount of time they will qualify for nocturnal home oxygen.        (Attach Night Trending Report)    Complete order below: Diagnosis:____  Home oxygen at:  Length of Need: ? Lifetime ? 3 Months     ___lpm or __%   via  [] nasal cannula  []mask  [] other         []continuous []  with activity  []  Nocturnal   [] Portable Tanks []  Concentrator  [] Conserving Device        Therapist Signature:___Sammy Lundy RRT____     Date:  _01/09/2024__  Physician Signature:  __Electronically Signed in EMR_    Date:___  Physician Printed Name:  ____   NPI # ___    [] Patient Qualifies      [] Patient Does NOT 
    V2.0    [facility] [place]      Name:  Yovanny Levine /Age/Sex: 1975  (49 y.o. male)   MRN & CSN:  [id number] & [id number] Encounter Date/Time: 2025 1:28 PM EST   Location:  OBS  PCP: José Luis Izquierdo MD     Attending:Matias Peck MD       Hospital Day: 1    Assessment and Recommendations   Yovanny Levine is a 49 y.o. male with pmh of ESRD on HD,  HTN, CAD, DM, HLD who presents with Hypervolemia associated with renal insufficiency. Missed HD . Labs showed Na 135, K+ 6.2, Cr 11.7, hgb 10.0. Chest xray showing pulmonary edema. Admitted for HD      Plan:   Hypervolemia secondary to ESRD and acute on chronic systolic HF: Missed HD  due to transportation issues.  Given IV Lasix x 1.  HD  and  per Dr. James.  Continue calcitriol.  On beta-blocker, Imdur, hydralazine.  No ACE/ARB in the setting of end-stage renal disease and hyperkalemia.  Hyperkalemia: K+ 6.2 and getting HD. Will repeat  and treat as needed  Accelerated hypertension: Systolic blood pressure up to 180s-190s.  Increased hydralazine to 50 mg 3 times daily.  Continue metoprolol, Imdur.  IV meds as needed.  Chronic respiratory failure with hypoxia: Stable at baseline of 3 L nasal cannula wean/titrate accordingly.  CAD s/p  PCI to proximal distal LAD on 2024.  Continue DAPT, statin, beta-blocker.  Type 2 diabetes: Basal insulin abbreviated with stable glycemic control.  Continue SSI.  History of osteomyelitis with amputations.  Hyperlipidemia: Per history, continue statin.  Superficial skin laceration of the right foot: Occurred after stepping on foreign body.  No evidence of infection.  X-rays without evidence of foreign body.  Wound care to consult.      Diet ADULT DIET; Regular   DVT Prophylaxis [] Lovenox, [x]  Heparin, [] SCDs, [] Ambulation,  [] Eliquis, [] Xarelto  [] Coumadin   Code Status Full Code   Disposition From: Home  Expected Disposition: Home  Estimated Date of Discharge:  after 
4 Eyes Skin Assessment     NAME:  Yovanny Levine  YOB: 1975  MEDICAL RECORD NUMBER:  [id number]    The patient is being assessed for  Admission    I agree that at least one RN has performed a thorough Head to Toe Skin Assessment on the patient. ALL assessment sites listed below have been assessed.      Areas assessed by both nurses:    Head, Face, Ears, Shoulders, Back, Chest, Arms, Elbows, Hands, Sacrum. Buttock, Coccyx, Ischium, Legs. Feet and Heels, and Under Medical Devices         Does the Patient have a Wound? Yes wound(s) were present on assessment. LDA wound assessment was Initiated and completed by RN Right foot wound & abdomen.        Pradeep Prevention initiated by RN: Yes  Wound Care Orders initiated by RN: Yes    Pressure Injury (Stage 3,4, Unstageable, DTI, NWPT, and Complex wounds) if present, place Wound referral order by RN under : Yes    New Ostomies, if present place, Ostomy referral order under : No     Nurse 1 eSignature: Electronically signed by Teresa Barbosa RN on 1/7/25 at 3:03 PM EST    **SHARE this note so that the co-signing nurse can place an eSignature**    Nurse 2 eSignature: Electronically signed by Hamida Lebron RN on 1/7/25 at 3:51 PM EST   
Nephrology Progress Note        [address] [facility], [address] [place] [address]  Phone: [phone]  Office Hours: 8:30AM - 4:30PM  Monday - Friday 1/8/2025 6:46 AM  Subjective:   Admit Date: 1/7/2025  PCP: José Luis Izquierdo MD  Interval History: on NC  Feeling better but not at baseline    Diet: ADULT DIET; Regular      Data:   Scheduled Meds:   metoprolol succinate  50 mg Oral Daily    insulin glargine  7 Units SubCUTAneous Nightly    calcitRIOL  0.5 mcg Oral BID    calcium carbonate  2 tablet Oral Daily    aspirin  81 mg Oral Daily    isosorbide mononitrate  30 mg Oral Daily    atorvastatin  40 mg Oral Nightly    clopidogrel  75 mg Oral Daily    insulin lispro  0-4 Units SubCUTAneous 4x Daily AC & HS    insulin lispro  0-4 Units SubCUTAneous Q4H    sodium chloride flush  5-40 mL IntraVENous 2 times per day    heparin (porcine)  5,000 Units SubCUTAneous 3 times per day    hydrALAZINE  50 mg Oral 3 times per day     Continuous Infusions:   dextrose      sodium chloride       PRN Meds:albuterol sulfate HFA, oxyCODONE-acetaminophen, glucose, dextrose bolus **OR** dextrose bolus, glucagon (rDNA), dextrose, sodium chloride flush, sodium chloride, ondansetron **OR** ondansetron, polyethylene glycol, acetaminophen **OR** acetaminophen  I/O last 3 completed shifts:  In: 620 [P.O.:120]  Out: 3680   No intake/output data recorded.    Intake/Output Summary (Last 24 hours) at 1/8/2025 0646  Last data filed at 1/7/2025 1457  Gross per 24 hour   Intake 620 ml   Output 3680 ml   Net -3060 ml       CBC:   Recent Labs     01/07/25  0052 01/07/25  0503   WBC 6.8 7.1   HGB 10.0* 10.0*    144       BMP:    Recent Labs     01/07/25  0052 01/07/25  0503 01/07/25  0509    135*  --    K 5.5* 6.2*  --    CL 92* 93*  --    CO2 21 17*  --    * 116*  --    CREATININE 11.6* 11.7*  --    GLUCOSE 151* 147* 140   CALCIUM 7.5* 7.4*  --      Hepatic:   Recent Labs     01/07/25  0052 01/07/25  0503   AST 
Patient Name: Yovanny Levine  Patient : 1975  MRN: [id number]     Acct: [id number]  Date of Admission: 2025  Room/Bed: OBS /VXF53-23  Code Status:  Full Code  Allergies:   Allergies   Allergen Reactions    Adhesive Tape Rash    Doxycycline Nausea And Vomiting    Reglan [Metoclopramide] Anxiety     Diagnosis:    Patient Active Problem List   Diagnosis    Hiatal hernia    Diabetes mellitus type 2, improved controlled    Diabetic neuropathy ([facility])    Panic attack    Anxiety associated with depression    Neck pain    DANIELA (obstructive sleep apnea)    Urinary frequency    Bilateral carpal tunnel syndrome- left worse than right    Hyperlipidemia with target LDL less than 100    Essential hypertension    Bronchitis    Osteomyelitis    Abscess    Periumbilical hernia    Sepsis ([facility])    Cellulitis of left foot    Constipation    Intractable nausea and vomiting    Uncontrolled type 2 diabetes mellitus    Nausea and vomiting    Diabetic foot infection ([facility])    Acute renal failure (ARF) ([facility])    Cellulitis    Cellulitis of left arm    Cellulitis, scrotum    Acute epididymitis    Irene gangrene    Recurrent major depressive disorder, in full remission ([facility])    Generalized anxiety disorder    Necrotizing fasciitis    Syncope    Right groin wound    Nephrotic syndrome    Generalized edema    Stage 3b chronic kidney disease ([facility])    Diabetic nephropathy associated with type 2 diabetes mellitus ([facility])    Hypoalbuminemia    Chronic kidney disease, stage IV (severe) ([facility])    FH: CAD (coronary artery disease)    ASCVD (arteriosclerotic cardiovascular disease)    Other proteinuria    Chest pain    Surgical wound dehiscence    CARMEN (acute kidney injury) ([facility])    Anemia    Chest tightness    NSTEMI (non-ST elevated myocardial infarction) ([facility])    Diabetic foot ulcer with osteomyelitis ([facility])    ESRD (end stage renal disease) ([facility])    Problem with vascular access    Acute on chronic respiratory failure with hypoxia    CAD 
Pt asked this nurse to sign the independent waiver. This nurse educated pt on reason for bed alarm. Pt still opted to refuse the bed alarm and signed the independent waiver. This nurse notified Charge RN.   
Pt has 2 lpm home oxygen with [employer].  Will walk pt to see if pt needs more than 2 lpm while ambulating.    
[phone]       
left infrapopliteal vessels, particularly involving the left peroneal artery. This is unchanged from the prior studies. No inflow lesion in the femoropopliteal segment. Electronically signed by Tan Reynoso    Vascular duplex lower extremity venous left    Result Date: 12/14/2024  EXAM: US Duplex Left Lower Extremity Veins. CLINICAL HISTORY: The patient is 49 years old and is Male; Pain left entire leg. TECHNIQUE: Real-time ultrasound scan of the veins of the left lower extremity with color Doppler flow, spectral waveform analysis and compression. COMPARISON: 8/27/2024 FINDINGS: DEEP VEINS: The common femoral, superficial femoral, and popliteal veins are echolucent and compressible. These vessels demonstrate respiratory variation and augmentation. There is normal color Doppler flow throughout. The visualized calf veins are also patent. SUPERFICIAL VEINS: The visualized greater saphenous vein is patent. SOFT TISSUES: No popliteal fossa cyst or other abnormalities. OTHER FINDINGS: -VAS DUP LOWER EXTREMITY VENOUS LEFT     1. No deep venous thrombosis in the left lower extremity. No change from 4 months earlier. Electronically signed by Tan Reynoso    XR CHEST PORTABLE    Result Date: 12/14/2024  Chest X-ray INDICATION: Chest pain. COMPARISON: 11/15/2024. TECHNIQUE: AP/PA view of the chest was obtained. FINDINGS: Cardiomegaly is identified. There is mild prominence of the pulmonary interstitium. Right lower lobe opacity is identified. No sizable pleural effusion or pneumothorax is present.     1. Right lower lobe opacity likely relating to infiltrate. Continued follow-up is recommended to ensure resolution. 2. Stable cardiomegaly. Electronically signed by Abdi Schafer           CBC:   Recent Labs     01/07/25  0052 01/07/25  0503 01/08/25  0527   WBC 6.8 7.1 4.8   HGB 10.0* 10.0* 9.8*    144 130*     BMP:    Recent Labs     01/07/25  0052 01/07/25  0503 01/07/25  0509 01/08/25  0527    135*  --  137   K 5.5*

## 2025-01-09 NOTE — DISCHARGE SUMMARY
suggest a high risk of cardiac events. •  Perfusion Defect: There is a left ventricular stress perfusion defect that is medium to large in size present in the mid to distal inferior segment(s) that is partially reversible. The defect is consistent with abnormal perfusion in the LCx or RCA territories. This defect was visualized during the stress and rest phases of imaging. •  ECG: Resting ECG demonstrates normal sinus rhythm and first-degree AV block. •  Stress Test: A pharmacological stress test was performed using regadenoson (Lexiscan). The patient reported no symptoms during the stress test. Abnormal stress test Large severe intensity partially fixed defect lateral wall and inferior wall suggestive of MI with mao-infarct ischemia Stress test was reviewed and compared with prior study in 2022, findings are new. Recommend coronary angiogram to further delineate anatomy given history of PCI in the past     XR TIBIA FIBULA LEFT (2 VIEWS)    Result Date: 12/15/2024  EXAM: XR Left Tibia Fibula, 2 Views. CLINICAL HISTORY: The patient is 49 years old and is Male. Left leg pain. Pneumonia, unspecified organism. COMPARISON: CT of the left tibia and fibula from 8/1/2023 performed with contrast. Plain films of the left tibia and fibula from 2/17/2022. FINDINGS: BONES: No acute fracture or focal osseous lesion. JOINTS: No dislocation. The joint spaces are normal. SOFT TISSUES: The soft tissues are unremarkable except for arterial calcifications.     1. No acute osseous abnormality. No change from the prior studies. Electronically signed by Tan Reynoso    XR CHEST (2 VW)    Result Date: 12/15/2024  EXAM: XR Chest, 2 Views. CLINICAL HISTORY: The patient is 49 years old and is Male ; shortness of breath pneumonia, unspecified organism / Reason for exam:->shortness of breath COMPARISON: Comparison is made to the prior radiograph from one day earlier. FINDINGS: LUNGS: There is slight interval improvement in pulmonary vascular

## 2025-01-16 ENCOUNTER — HOSPITAL ENCOUNTER (OUTPATIENT)
Dept: WOUND CARE | Age: 50
Discharge: HOME | End: 2025-01-16
Attending: STUDENT IN AN ORGANIZED HEALTH CARE EDUCATION/TRAINING PROGRAM
Payer: MEDICARE

## 2025-01-16 VITALS
TEMPERATURE: 99.1 F | RESPIRATION RATE: 18 BRPM | SYSTOLIC BLOOD PRESSURE: 159 MMHG | DIASTOLIC BLOOD PRESSURE: 63 MMHG | HEART RATE: 89 BPM

## 2025-01-16 DIAGNOSIS — G89.4 CHRONIC PAIN SYNDROME: ICD-10-CM

## 2025-01-16 DIAGNOSIS — Z89.422 ABSENCE OF TOE OF LEFT FOOT (HCC): ICD-10-CM

## 2025-01-16 DIAGNOSIS — N18.6 ESRD (END STAGE RENAL DISEASE) ON DIALYSIS (HCC): ICD-10-CM

## 2025-01-16 DIAGNOSIS — L97.421 DIABETIC ULCER OF LEFT MIDFOOT ASSOCIATED WITH TYPE 2 DIABETES MELLITUS, LIMITED TO BREAKDOWN OF SKIN (HCC): ICD-10-CM

## 2025-01-16 DIAGNOSIS — Z99.2 ESRD (END STAGE RENAL DISEASE) ON DIALYSIS (HCC): ICD-10-CM

## 2025-01-16 DIAGNOSIS — E11.621 DIABETIC ULCER OF LEFT MIDFOOT ASSOCIATED WITH TYPE 2 DIABETES MELLITUS, LIMITED TO BREAKDOWN OF SKIN (HCC): ICD-10-CM

## 2025-01-16 DIAGNOSIS — L98.491 ULCER OF ABDOMEN WALL, LIMITED TO BREAKDOWN OF SKIN (HCC): ICD-10-CM

## 2025-01-16 DIAGNOSIS — E11.621 DIABETIC ULCER OF RIGHT MIDFOOT ASSOCIATED WITH TYPE 2 DIABETES MELLITUS, WITH FAT LAYER EXPOSED (HCC): Primary | ICD-10-CM

## 2025-01-16 DIAGNOSIS — L97.412 DIABETIC ULCER OF RIGHT MIDFOOT ASSOCIATED WITH TYPE 2 DIABETES MELLITUS, WITH FAT LAYER EXPOSED (HCC): Primary | ICD-10-CM

## 2025-01-16 PROCEDURE — 11042 DBRDMT SUBQ TIS 1ST 20SQCM/<: CPT

## 2025-01-16 PROCEDURE — 11042 DBRDMT SUBQ TIS 1ST 20SQCM/<: CPT | Performed by: NURSE PRACTITIONER

## 2025-01-16 PROCEDURE — 29581 APPL MULTLAYER CMPRN SYS LEG: CPT

## 2025-01-16 PROCEDURE — 97597 DBRDMT OPN WND 1ST 20 CM/<: CPT | Performed by: NURSE PRACTITIONER

## 2025-01-16 PROCEDURE — 6370000000 HC RX 637 (ALT 250 FOR IP): Performed by: SURGERY

## 2025-01-16 RX ORDER — LIDOCAINE 40 MG/G
CREAM TOPICAL ONCE
OUTPATIENT
Start: 2025-01-16 | End: 2025-01-16

## 2025-01-16 RX ORDER — TRIAMCINOLONE ACETONIDE 1 MG/G
OINTMENT TOPICAL ONCE
OUTPATIENT
Start: 2025-01-16 | End: 2025-01-16

## 2025-01-16 RX ORDER — CLOBETASOL PROPIONATE 0.5 MG/G
OINTMENT TOPICAL ONCE
OUTPATIENT
Start: 2025-01-16 | End: 2025-01-16

## 2025-01-16 RX ORDER — SILVER SULFADIAZINE 10 MG/G
CREAM TOPICAL ONCE
OUTPATIENT
Start: 2025-01-16 | End: 2025-01-16

## 2025-01-16 RX ORDER — BACITRACIN ZINC AND POLYMYXIN B SULFATE 500; 1000 [USP'U]/G; [USP'U]/G
OINTMENT TOPICAL ONCE
OUTPATIENT
Start: 2025-01-16 | End: 2025-01-16

## 2025-01-16 RX ORDER — GENTAMICIN SULFATE 1 MG/G
OINTMENT TOPICAL ONCE
OUTPATIENT
Start: 2025-01-16 | End: 2025-01-16

## 2025-01-16 RX ORDER — BETAMETHASONE DIPROPIONATE 0.5 MG/G
CREAM TOPICAL ONCE
OUTPATIENT
Start: 2025-01-16 | End: 2025-01-16

## 2025-01-16 RX ORDER — GENTAMICIN SULFATE 1 MG/G
OINTMENT TOPICAL
Qty: 30 G | Refills: 1 | Status: SHIPPED | OUTPATIENT
Start: 2025-01-16 | End: 2025-01-23

## 2025-01-16 RX ORDER — GENTAMICIN SULFATE 1 MG/G
OINTMENT TOPICAL ONCE
Status: COMPLETED | OUTPATIENT
Start: 2025-01-16 | End: 2025-01-16

## 2025-01-16 RX ORDER — MUPIROCIN 20 MG/G
OINTMENT TOPICAL ONCE
OUTPATIENT
Start: 2025-01-16 | End: 2025-01-16

## 2025-01-16 RX ORDER — LIDOCAINE HYDROCHLORIDE 40 MG/ML
SOLUTION TOPICAL ONCE
OUTPATIENT
Start: 2025-01-16 | End: 2025-01-16

## 2025-01-16 RX ORDER — GINSENG 100 MG
CAPSULE ORAL ONCE
OUTPATIENT
Start: 2025-01-16 | End: 2025-01-16

## 2025-01-16 RX ORDER — SODIUM CHLOR/HYPOCHLOROUS ACID 0.033 %
SOLUTION, IRRIGATION IRRIGATION ONCE
OUTPATIENT
Start: 2025-01-16 | End: 2025-01-16

## 2025-01-16 RX ORDER — LIDOCAINE HYDROCHLORIDE 20 MG/ML
JELLY TOPICAL ONCE
OUTPATIENT
Start: 2025-01-16 | End: 2025-01-16

## 2025-01-16 RX ORDER — NEOMYCIN/BACITRACIN/POLYMYXINB 3.5-400-5K
OINTMENT (GRAM) TOPICAL ONCE
OUTPATIENT
Start: 2025-01-16 | End: 2025-01-16

## 2025-01-16 RX ORDER — LIDOCAINE 50 MG/G
OINTMENT TOPICAL ONCE
OUTPATIENT
Start: 2025-01-16 | End: 2025-01-16

## 2025-01-16 RX ORDER — CLOBETASOL PROPIONATE 0.5 MG/G
OINTMENT TOPICAL ONCE
Status: COMPLETED | OUTPATIENT
Start: 2025-01-16 | End: 2025-01-16

## 2025-01-16 RX ORDER — OXYCODONE AND ACETAMINOPHEN 7.5; 325 MG/1; MG/1
1 TABLET ORAL EVERY 8 HOURS PRN
Qty: 90 TABLET | Refills: 0 | Status: SHIPPED | OUTPATIENT
Start: 2025-01-16 | End: 2025-02-15

## 2025-01-16 RX ADMIN — CLOBETASOL PROPIONATE: 0.5 OINTMENT TOPICAL at 11:10

## 2025-01-16 RX ADMIN — GENTAMICIN SULFATE: 1 OINTMENT TOPICAL at 11:10

## 2025-01-16 ASSESSMENT — PAIN DESCRIPTION - FREQUENCY: FREQUENCY: CONTINUOUS

## 2025-01-16 ASSESSMENT — PAIN DESCRIPTION - ORIENTATION: ORIENTATION: RIGHT;MID

## 2025-01-16 ASSESSMENT — PAIN DESCRIPTION - LOCATION: LOCATION: FOOT;ABDOMEN

## 2025-01-16 ASSESSMENT — PAIN DESCRIPTION - DESCRIPTORS: DESCRIPTORS: SHARP

## 2025-01-16 ASSESSMENT — PAIN SCALES - GENERAL: PAINLEVEL_OUTOF10: 7

## 2025-01-16 NOTE — PROGRESS NOTES
Multilayer Compression Wrap   (Not Unna) Below the Knee    NAME:  Yovanny Levine  YOB: 1975  MEDICAL RECORD NUMBER:  [id number]  DATE:  1/16/2025    Multilayer compression wrap: Removed old Multilayer wrap if indicated and wash leg with mild soap/water.  Applied moisturizing agent to dry skin as needed.   Applied primary and secondary dressing as ordered.  Applied multilayered dressing below the knee to right lower leg.  Applied multilayered dressing below the knee to left lower leg.  Instructed patient/caregiver not to remove dressing and to keep it clean and dry.   Instructed patient/caregiver on complications to report to provider, such as pain, numbness in toes, heavy drainage, and slippage of dressing.  Instructed patient on purpose of compression dressing and on activity and exercise recommendations.      Electronically signed by Laila Cabrera RN on 1/16/2025 at 11:08 AM  
 Area  Debrided:  2 sq cm     Bleeding:  None    Hemostasis Achieved:  not needed    Procedural Pain:  0  / 10     Post Procedural Pain:  0 / 10     Response to treatment:  Well tolerated by patient.     Plan:     Patient Instructions   PHYSICIAN ORDERS AND DISCHARGE INSTRUCTIONS     Wound Care Notes:  Sees Dr Kumar.                Applied for Kerecis grafts on 24  Donated Kerecis applied to left lateral foot 24                             Orders for this week: 2025    Right plantar : Wash with soap and water, pat dry.  A+D to Left foot   Gentamicin and stimulen powder  to wound bed   Apply whole qwick to periwound sec  Cover with ABD   Wrap BLE with coban 2 full  Leave in place for 1 week     Abdomen: Wash with soap and water, pat dry.  Shave around area   Gentamicin, clobetasol and stimulen powder  Cover with ca alginate and gentac   Leave in place for week       Leave in place til next visit to [facility]    Plan: HBO workup started 9/10/24.   [facility] discharge due to not home bound 24.   Toenails 24. Called [facility] about abx. No answer 10/28/24       Follow Up Instructions: 1 weeks  Primary Wound Care Provider: Shelly Rubalcava CNP  Call  for any questions or concerns.  Central [phone] for imaging lab work

## 2025-01-16 NOTE — PATIENT INSTRUCTIONS
PHYSICIAN ORDERS AND DISCHARGE INSTRUCTIONS     Wound Care Notes:  Sees Dr Kumar.                Applied for Kerecis grafts on 24  Donated Kerecis applied to left lateral foot 24                             Orders for this week: 2025    Right plantar : Wash with soap and water, pat dry.  A+D to Left foot   Gentamicin and stimulen powder  to wound bed   Apply whole qwick to periwound sec  Cover with ABD   Wrap BLE with coban 2 full  Leave in place for 1 week     Abdomen: Wash with soap and water, pat dry.  Shave around area   Gentamicin, clobetasol and stimulen powder  Cover with ca alginate and gentac   Leave in place for week       Leave in place til next visit to [facility] Care Center    Plan: HBO workup started 9/10/24.   [facility] discharge due to not home bound 24.   Toenails 24. Called [facility] about abx. No answer 10/28/24       Follow Up Instructions: 1 weeks  Primary Wound Care Provider: Shelly Rubalcava CNP  Call  for any questions or concerns.  Central [phone] for imaging lab work

## 2025-01-20 ENCOUNTER — HOSPITAL ENCOUNTER (INPATIENT)
Age: 50
LOS: 4 days | Discharge: HOME HEALTH | DRG: 280 | End: 2025-01-28
Attending: INTERNAL MEDICINE | Admitting: INTERNAL MEDICINE
Payer: MEDICARE

## 2025-01-20 ENCOUNTER — APPOINTMENT (OUTPATIENT)
Dept: GENERAL RADIOLOGY | Age: 50
DRG: 280 | End: 2025-01-20
Payer: MEDICARE

## 2025-01-20 DIAGNOSIS — N18.6 ESRD (END STAGE RENAL DISEASE) (HCC): ICD-10-CM

## 2025-01-20 DIAGNOSIS — I50.20 HFREF (HEART FAILURE WITH REDUCED EJECTION FRACTION) (HCC): ICD-10-CM

## 2025-01-20 DIAGNOSIS — N28.9 HYPERVOLEMIA ASSOCIATED WITH RENAL INSUFFICIENCY: Primary | ICD-10-CM

## 2025-01-20 DIAGNOSIS — E87.70 HYPERVOLEMIA ASSOCIATED WITH RENAL INSUFFICIENCY: Primary | ICD-10-CM

## 2025-01-20 LAB
ALBUMIN SERPL-MCNC: 4.1 G/DL (ref 3.4–5)
ALBUMIN/GLOB SERPL: 1.2 {RATIO} (ref 1.1–2.2)
ALP SERPL-CCNC: 94 U/L (ref 40–129)
ALT SERPL-CCNC: 11 U/L (ref 10–40)
ANION GAP SERPL CALCULATED.3IONS-SCNC: 17 MMOL/L (ref 9–17)
AST SERPL-CCNC: 23 U/L (ref 15–37)
BASOPHILS # BLD: 0.04 K/UL
BASOPHILS NFR BLD: 1 % (ref 0–1)
BILIRUB SERPL-MCNC: 1.2 MG/DL (ref 0–1)
BNP SERPL-MCNC: ABNORMAL PG/ML (ref 0–125)
BUN SERPL-MCNC: 45 MG/DL (ref 7–20)
CALCIUM SERPL-MCNC: 8.5 MG/DL (ref 8.3–10.6)
CHLORIDE SERPL-SCNC: 91 MMOL/L (ref 99–110)
CO2 SERPL-SCNC: 27 MMOL/L (ref 21–32)
CREAT SERPL-MCNC: 5.1 MG/DL (ref 0.9–1.3)
EOSINOPHIL # BLD: 0.13 K/UL
EOSINOPHILS RELATIVE PERCENT: 2 % (ref 0–3)
ERYTHROCYTE [DISTWIDTH] IN BLOOD BY AUTOMATED COUNT: 16.4 % (ref 11.7–14.9)
GFR, ESTIMATED: 12 ML/MIN/1.73M2
GLUCOSE BLD-MCNC: 189 MG/DL (ref 74–99)
GLUCOSE SERPL-MCNC: 171 MG/DL (ref 74–99)
HCT VFR BLD AUTO: 30.9 % (ref 42–52)
HGB BLD-MCNC: 10.1 G/DL (ref 13.5–18)
IMM GRANULOCYTES # BLD AUTO: 0.02 K/UL
IMM GRANULOCYTES NFR BLD: 0 %
INFLUENZA A BY PCR: NOT DETECTED
INFLUENZA B BY PCR: NOT DETECTED
LACTATE BLDV-SCNC: 1.7 MMOL/L (ref 0.4–2)
LYMPHOCYTES NFR BLD: 0.3 K/UL
LYMPHOCYTES RELATIVE PERCENT: 5 % (ref 24–44)
MAGNESIUM SERPL-MCNC: 2.1 MG/DL (ref 1.8–2.4)
MCH RBC QN AUTO: 30.3 PG (ref 27–31)
MCHC RBC AUTO-ENTMCNC: 32.7 G/DL (ref 32–36)
MCV RBC AUTO: 92.8 FL (ref 78–100)
MONOCYTES NFR BLD: 0.45 K/UL
MONOCYTES NFR BLD: 7 % (ref 0–4)
NEUTROPHILS NFR BLD: 85 % (ref 36–66)
NEUTS SEG NFR BLD: 5.3 K/UL
PLATELET # BLD AUTO: 170 K/UL (ref 140–440)
PMV BLD AUTO: 10.3 FL (ref 7.5–11.1)
POTASSIUM SERPL-SCNC: 3.8 MMOL/L (ref 3.5–5.1)
PROT SERPL-MCNC: 7.7 G/DL (ref 6.4–8.2)
RBC # BLD AUTO: 3.33 M/UL (ref 4.6–6.2)
SARS-COV-2 RDRP RESP QL NAA+PROBE: NOT DETECTED
SODIUM SERPL-SCNC: 135 MMOL/L (ref 136–145)
SPECIMEN DESCRIPTION: NORMAL
WBC OTHER # BLD: 6.2 K/UL (ref 4–10.5)

## 2025-01-20 PROCEDURE — 2500000003 HC RX 250 WO HCPCS: Performed by: INTERNAL MEDICINE

## 2025-01-20 PROCEDURE — 71045 X-RAY EXAM CHEST 1 VIEW: CPT

## 2025-01-20 PROCEDURE — 85025 COMPLETE CBC W/AUTO DIFF WBC: CPT

## 2025-01-20 PROCEDURE — 80053 COMPREHEN METABOLIC PANEL: CPT

## 2025-01-20 PROCEDURE — 83735 ASSAY OF MAGNESIUM: CPT

## 2025-01-20 PROCEDURE — 6370000000 HC RX 637 (ALT 250 FOR IP): Performed by: INTERNAL MEDICINE

## 2025-01-20 PROCEDURE — 82962 GLUCOSE BLOOD TEST: CPT

## 2025-01-20 PROCEDURE — 87502 INFLUENZA DNA AMP PROBE: CPT

## 2025-01-20 PROCEDURE — 94761 N-INVAS EAR/PLS OXIMETRY MLT: CPT

## 2025-01-20 PROCEDURE — 96372 THER/PROPH/DIAG INJ SC/IM: CPT

## 2025-01-20 PROCEDURE — 87635 SARS-COV-2 COVID-19 AMP PRB: CPT

## 2025-01-20 PROCEDURE — 83880 ASSAY OF NATRIURETIC PEPTIDE: CPT

## 2025-01-20 PROCEDURE — 83605 ASSAY OF LACTIC ACID: CPT

## 2025-01-20 PROCEDURE — 6360000002 HC RX W HCPCS: Performed by: INTERNAL MEDICINE

## 2025-01-20 PROCEDURE — 99285 EMERGENCY DEPT VISIT HI MDM: CPT

## 2025-01-20 PROCEDURE — 2700000000 HC OXYGEN THERAPY PER DAY

## 2025-01-20 PROCEDURE — G0378 HOSPITAL OBSERVATION PER HR: HCPCS

## 2025-01-20 RX ORDER — SODIUM CHLORIDE 0.9 % (FLUSH) 0.9 %
5-40 SYRINGE (ML) INJECTION PRN
Status: DISCONTINUED | OUTPATIENT
Start: 2025-01-20 | End: 2025-01-28 | Stop reason: HOSPADM

## 2025-01-20 RX ORDER — METOPROLOL SUCCINATE 50 MG/1
50 TABLET, EXTENDED RELEASE ORAL DAILY
Status: DISCONTINUED | OUTPATIENT
Start: 2025-01-21 | End: 2025-01-28 | Stop reason: HOSPADM

## 2025-01-20 RX ORDER — INSULIN GLARGINE 100 [IU]/ML
10 INJECTION, SOLUTION SUBCUTANEOUS NIGHTLY
Status: DISCONTINUED | OUTPATIENT
Start: 2025-01-20 | End: 2025-01-28 | Stop reason: HOSPADM

## 2025-01-20 RX ORDER — ACETAMINOPHEN 650 MG/1
650 SUPPOSITORY RECTAL EVERY 6 HOURS PRN
Status: DISCONTINUED | OUTPATIENT
Start: 2025-01-20 | End: 2025-01-28 | Stop reason: HOSPADM

## 2025-01-20 RX ORDER — SODIUM CHLORIDE 0.9 % (FLUSH) 0.9 %
5-40 SYRINGE (ML) INJECTION EVERY 12 HOURS SCHEDULED
Status: DISCONTINUED | OUTPATIENT
Start: 2025-01-20 | End: 2025-01-28 | Stop reason: HOSPADM

## 2025-01-20 RX ORDER — ASPIRIN 81 MG/1
81 TABLET, CHEWABLE ORAL DAILY
Status: DISCONTINUED | OUTPATIENT
Start: 2025-01-21 | End: 2025-01-28 | Stop reason: HOSPADM

## 2025-01-20 RX ORDER — CALCIUM CARBONATE 500 MG/1
2 TABLET, CHEWABLE ORAL DAILY
Status: DISCONTINUED | OUTPATIENT
Start: 2025-01-21 | End: 2025-01-24

## 2025-01-20 RX ORDER — ALBUTEROL SULFATE 90 UG/1
2 INHALANT RESPIRATORY (INHALATION) 4 TIMES DAILY PRN
Status: DISCONTINUED | OUTPATIENT
Start: 2025-01-20 | End: 2025-01-28 | Stop reason: HOSPADM

## 2025-01-20 RX ORDER — INSULIN LISPRO 100 [IU]/ML
0-8 INJECTION, SOLUTION INTRAVENOUS; SUBCUTANEOUS
Status: DISCONTINUED | OUTPATIENT
Start: 2025-01-20 | End: 2025-01-28 | Stop reason: HOSPADM

## 2025-01-20 RX ORDER — GENTAMICIN SULFATE 1 MG/G
OINTMENT TOPICAL 2 TIMES DAILY
Status: DISCONTINUED | OUTPATIENT
Start: 2025-01-20 | End: 2025-01-28 | Stop reason: HOSPADM

## 2025-01-20 RX ORDER — POLYETHYLENE GLYCOL 3350 17 G/17G
17 POWDER, FOR SOLUTION ORAL DAILY PRN
Status: DISCONTINUED | OUTPATIENT
Start: 2025-01-20 | End: 2025-01-28 | Stop reason: HOSPADM

## 2025-01-20 RX ORDER — DEXTROSE MONOHYDRATE 100 MG/ML
INJECTION, SOLUTION INTRAVENOUS CONTINUOUS PRN
Status: DISCONTINUED | OUTPATIENT
Start: 2025-01-20 | End: 2025-01-26

## 2025-01-20 RX ORDER — ACETAMINOPHEN 325 MG/1
650 TABLET ORAL EVERY 6 HOURS PRN
Status: DISCONTINUED | OUTPATIENT
Start: 2025-01-20 | End: 2025-01-28 | Stop reason: HOSPADM

## 2025-01-20 RX ORDER — ONDANSETRON 4 MG/1
4 TABLET, ORALLY DISINTEGRATING ORAL EVERY 8 HOURS PRN
Status: DISCONTINUED | OUTPATIENT
Start: 2025-01-20 | End: 2025-01-20

## 2025-01-20 RX ORDER — CLOPIDOGREL BISULFATE 75 MG/1
75 TABLET ORAL DAILY
Status: DISCONTINUED | OUTPATIENT
Start: 2025-01-21 | End: 2025-01-28 | Stop reason: HOSPADM

## 2025-01-20 RX ORDER — ATORVASTATIN CALCIUM 40 MG/1
40 TABLET, FILM COATED ORAL NIGHTLY
Status: DISCONTINUED | OUTPATIENT
Start: 2025-01-20 | End: 2025-01-28 | Stop reason: HOSPADM

## 2025-01-20 RX ORDER — HYDRALAZINE HYDROCHLORIDE 25 MG/1
50 TABLET, FILM COATED ORAL EVERY 8 HOURS SCHEDULED
Status: DISCONTINUED | OUTPATIENT
Start: 2025-01-20 | End: 2025-01-23

## 2025-01-20 RX ORDER — CALCITRIOL 0.25 UG/1
0.5 CAPSULE, LIQUID FILLED ORAL 2 TIMES DAILY
Status: DISCONTINUED | OUTPATIENT
Start: 2025-01-20 | End: 2025-01-28 | Stop reason: HOSPADM

## 2025-01-20 RX ORDER — GLUCAGON 1 MG/ML
1 KIT INJECTION PRN
Status: DISCONTINUED | OUTPATIENT
Start: 2025-01-20 | End: 2025-01-26

## 2025-01-20 RX ORDER — ONDANSETRON 2 MG/ML
4 INJECTION INTRAMUSCULAR; INTRAVENOUS EVERY 6 HOURS PRN
Status: DISCONTINUED | OUTPATIENT
Start: 2025-01-20 | End: 2025-01-20

## 2025-01-20 RX ORDER — OXYCODONE AND ACETAMINOPHEN 7.5; 325 MG/1; MG/1
1 TABLET ORAL EVERY 8 HOURS PRN
Status: DISCONTINUED | OUTPATIENT
Start: 2025-01-20 | End: 2025-01-28 | Stop reason: HOSPADM

## 2025-01-20 RX ORDER — ISOSORBIDE MONONITRATE 30 MG/1
30 TABLET, EXTENDED RELEASE ORAL DAILY
Status: DISCONTINUED | OUTPATIENT
Start: 2025-01-21 | End: 2025-01-24

## 2025-01-20 RX ORDER — SODIUM CHLORIDE 9 MG/ML
INJECTION, SOLUTION INTRAVENOUS PRN
Status: DISCONTINUED | OUTPATIENT
Start: 2025-01-20 | End: 2025-01-28 | Stop reason: HOSPADM

## 2025-01-20 RX ORDER — PROCHLORPERAZINE EDISYLATE 5 MG/ML
10 INJECTION INTRAMUSCULAR; INTRAVENOUS EVERY 6 HOURS PRN
Status: DISCONTINUED | OUTPATIENT
Start: 2025-01-20 | End: 2025-01-28 | Stop reason: HOSPADM

## 2025-01-20 RX ORDER — HEPARIN SODIUM 5000 [USP'U]/ML
5000 INJECTION, SOLUTION INTRAVENOUS; SUBCUTANEOUS EVERY 8 HOURS SCHEDULED
Status: COMPLETED | OUTPATIENT
Start: 2025-01-20 | End: 2025-01-23

## 2025-01-20 RX ADMIN — HEPARIN SODIUM 5000 UNITS: 5000 INJECTION INTRAVENOUS; SUBCUTANEOUS at 23:58

## 2025-01-20 RX ADMIN — INSULIN LISPRO 2 UNITS: 100 INJECTION, SOLUTION INTRAVENOUS; SUBCUTANEOUS at 23:58

## 2025-01-20 RX ADMIN — OXYCODONE HYDROCHLORIDE AND ACETAMINOPHEN 1 TABLET: 7.5; 325 TABLET ORAL at 23:57

## 2025-01-20 RX ADMIN — ATORVASTATIN CALCIUM 40 MG: 40 TABLET, FILM COATED ORAL at 23:56

## 2025-01-20 RX ADMIN — SODIUM CHLORIDE, PRESERVATIVE FREE 10 ML: 5 INJECTION INTRAVENOUS at 23:58

## 2025-01-20 RX ADMIN — HYDRALAZINE HYDROCHLORIDE 50 MG: 25 TABLET ORAL at 23:56

## 2025-01-20 RX ADMIN — INSULIN GLARGINE 10 UNITS: 100 INJECTION, SOLUTION SUBCUTANEOUS at 23:58

## 2025-01-20 ASSESSMENT — PAIN DESCRIPTION - PAIN TYPE
TYPE: ACUTE PAIN
TYPE: ACUTE PAIN

## 2025-01-20 ASSESSMENT — PAIN DESCRIPTION - ONSET: ONSET: ON-GOING

## 2025-01-20 ASSESSMENT — PAIN SCALES - GENERAL
PAINLEVEL_OUTOF10: 7
PAINLEVEL_OUTOF10: 8
PAINLEVEL_OUTOF10: 8

## 2025-01-20 ASSESSMENT — PAIN DESCRIPTION - LOCATION
LOCATION: FOOT

## 2025-01-20 ASSESSMENT — PAIN DESCRIPTION - FREQUENCY
FREQUENCY: CONTINUOUS
FREQUENCY: CONTINUOUS

## 2025-01-20 ASSESSMENT — PAIN DESCRIPTION - ORIENTATION
ORIENTATION: RIGHT;LEFT
ORIENTATION: RIGHT;LEFT
ORIENTATION: LEFT;RIGHT

## 2025-01-20 ASSESSMENT — PAIN DESCRIPTION - DESCRIPTORS: DESCRIPTORS: ACHING;THROBBING

## 2025-01-20 ASSESSMENT — PAIN - FUNCTIONAL ASSESSMENT: PAIN_FUNCTIONAL_ASSESSMENT: 0-10

## 2025-01-20 NOTE — ED TRIAGE NOTES
Patient presents to the ED with complaint of fatigue and shortness of breath that began last night. Patient was hoping his symptoms would go away after his dialysis today but he states he finished dialysis at approx 430pm and is still having symptoms.

## 2025-01-21 LAB
ANION GAP SERPL CALCULATED.3IONS-SCNC: 19 MMOL/L (ref 9–17)
BASOPHILS # BLD: 0.04 K/UL
BASOPHILS NFR BLD: 1 % (ref 0–1)
BUN SERPL-MCNC: 62 MG/DL (ref 7–20)
CALCIUM SERPL-MCNC: 8.2 MG/DL (ref 8.3–10.6)
CHLORIDE SERPL-SCNC: 93 MMOL/L (ref 99–110)
CO2 SERPL-SCNC: 24 MMOL/L (ref 21–32)
CREAT SERPL-MCNC: 6.4 MG/DL (ref 0.9–1.3)
EOSINOPHIL # BLD: 0.19 K/UL
EOSINOPHILS RELATIVE PERCENT: 3 % (ref 0–3)
ERYTHROCYTE [DISTWIDTH] IN BLOOD BY AUTOMATED COUNT: 16.4 % (ref 11.7–14.9)
GFR, ESTIMATED: 8 ML/MIN/1.73M2
GLUCOSE BLD-MCNC: 146 MG/DL (ref 74–99)
GLUCOSE BLD-MCNC: 151 MG/DL (ref 74–99)
GLUCOSE BLD-MCNC: 161 MG/DL (ref 74–99)
GLUCOSE BLD-MCNC: 182 MG/DL (ref 74–99)
GLUCOSE SERPL-MCNC: 184 MG/DL (ref 74–99)
HCT VFR BLD AUTO: 29.5 % (ref 42–52)
HGB BLD-MCNC: 9.5 G/DL (ref 13.5–18)
IMM GRANULOCYTES # BLD AUTO: 0.01 K/UL
IMM GRANULOCYTES NFR BLD: 0 %
LYMPHOCYTES NFR BLD: 0.47 K/UL
LYMPHOCYTES RELATIVE PERCENT: 8 % (ref 24–44)
MCH RBC QN AUTO: 30.7 PG (ref 27–31)
MCHC RBC AUTO-ENTMCNC: 32.2 G/DL (ref 32–36)
MCV RBC AUTO: 95.5 FL (ref 78–100)
MONOCYTES NFR BLD: 0.6 K/UL
MONOCYTES NFR BLD: 11 % (ref 0–4)
NEUTROPHILS NFR BLD: 77 % (ref 36–66)
NEUTS SEG NFR BLD: 4.41 K/UL
PLATELET # BLD AUTO: 151 K/UL (ref 140–440)
PMV BLD AUTO: 10.5 FL (ref 7.5–11.1)
POTASSIUM SERPL-SCNC: 5 MMOL/L (ref 3.5–5.1)
RBC # BLD AUTO: 3.09 M/UL (ref 4.6–6.2)
SODIUM SERPL-SCNC: 135 MMOL/L (ref 136–145)
WBC OTHER # BLD: 5.7 K/UL (ref 4–10.5)

## 2025-01-21 PROCEDURE — 96372 THER/PROPH/DIAG INJ SC/IM: CPT

## 2025-01-21 PROCEDURE — 94761 N-INVAS EAR/PLS OXIMETRY MLT: CPT

## 2025-01-21 PROCEDURE — 2500000003 HC RX 250 WO HCPCS: Performed by: INTERNAL MEDICINE

## 2025-01-21 PROCEDURE — 85025 COMPLETE CBC W/AUTO DIFF WBC: CPT

## 2025-01-21 PROCEDURE — 5A1D70Z PERFORMANCE OF URINARY FILTRATION, INTERMITTENT, LESS THAN 6 HOURS PER DAY: ICD-10-PCS | Performed by: INTERNAL MEDICINE

## 2025-01-21 PROCEDURE — 6370000000 HC RX 637 (ALT 250 FOR IP): Performed by: INTERNAL MEDICINE

## 2025-01-21 PROCEDURE — 2700000000 HC OXYGEN THERAPY PER DAY

## 2025-01-21 PROCEDURE — 6360000002 HC RX W HCPCS: Performed by: INTERNAL MEDICINE

## 2025-01-21 PROCEDURE — G0378 HOSPITAL OBSERVATION PER HR: HCPCS

## 2025-01-21 PROCEDURE — 82962 GLUCOSE BLOOD TEST: CPT

## 2025-01-21 PROCEDURE — 99223 1ST HOSP IP/OBS HIGH 75: CPT | Performed by: INTERNAL MEDICINE

## 2025-01-21 PROCEDURE — 80048 BASIC METABOLIC PNL TOTAL CA: CPT

## 2025-01-21 RX ADMIN — HEPARIN SODIUM 5000 UNITS: 5000 INJECTION INTRAVENOUS; SUBCUTANEOUS at 06:39

## 2025-01-21 RX ADMIN — HEPARIN SODIUM 5000 UNITS: 5000 INJECTION INTRAVENOUS; SUBCUTANEOUS at 20:38

## 2025-01-21 RX ADMIN — ISOSORBIDE MONONITRATE 30 MG: 30 TABLET, EXTENDED RELEASE ORAL at 11:44

## 2025-01-21 RX ADMIN — CALCITRIOL 0.5 MCG: 0.5 CAPSULE, LIQUID FILLED ORAL at 02:17

## 2025-01-21 RX ADMIN — GENTAMICIN SULFATE: 1 OINTMENT TOPICAL at 11:49

## 2025-01-21 RX ADMIN — HYDRALAZINE HYDROCHLORIDE 50 MG: 25 TABLET ORAL at 06:39

## 2025-01-21 RX ADMIN — ATORVASTATIN CALCIUM 40 MG: 40 TABLET, FILM COATED ORAL at 20:38

## 2025-01-21 RX ADMIN — METOPROLOL SUCCINATE 50 MG: 50 TABLET, EXTENDED RELEASE ORAL at 11:44

## 2025-01-21 RX ADMIN — CLOPIDOGREL BISULFATE 75 MG: 75 TABLET ORAL at 11:44

## 2025-01-21 RX ADMIN — GENTAMICIN SULFATE: 1 OINTMENT TOPICAL at 02:19

## 2025-01-21 RX ADMIN — SACUBITRIL AND VALSARTAN 1 TABLET: 24; 26 TABLET, FILM COATED ORAL at 21:27

## 2025-01-21 RX ADMIN — ASPIRIN 81 MG CHEWABLE TABLET 81 MG: 81 TABLET CHEWABLE at 11:44

## 2025-01-21 RX ADMIN — SODIUM CHLORIDE, PRESERVATIVE FREE 10 ML: 5 INJECTION INTRAVENOUS at 20:37

## 2025-01-21 RX ADMIN — CALCITRIOL 0.5 MCG: 0.5 CAPSULE, LIQUID FILLED ORAL at 21:27

## 2025-01-21 RX ADMIN — OXYCODONE HYDROCHLORIDE AND ACETAMINOPHEN 1 TABLET: 7.5; 325 TABLET ORAL at 20:38

## 2025-01-21 RX ADMIN — CALCITRIOL 0.5 MCG: 0.5 CAPSULE, LIQUID FILLED ORAL at 11:44

## 2025-01-21 RX ADMIN — ANTACID TABLETS 1000 MG: 500 TABLET, CHEWABLE ORAL at 11:44

## 2025-01-21 RX ADMIN — HEPARIN SODIUM 5000 UNITS: 5000 INJECTION INTRAVENOUS; SUBCUTANEOUS at 14:50

## 2025-01-21 RX ADMIN — INSULIN LISPRO 2 UNITS: 100 INJECTION, SOLUTION INTRAVENOUS; SUBCUTANEOUS at 20:37

## 2025-01-21 RX ADMIN — SACUBITRIL AND VALSARTAN 1 TABLET: 24; 26 TABLET, FILM COATED ORAL at 14:49

## 2025-01-21 RX ADMIN — HYDRALAZINE HYDROCHLORIDE 50 MG: 25 TABLET ORAL at 14:50

## 2025-01-21 RX ADMIN — INSULIN GLARGINE 10 UNITS: 100 INJECTION, SOLUTION SUBCUTANEOUS at 20:37

## 2025-01-21 RX ADMIN — HYDRALAZINE HYDROCHLORIDE 50 MG: 25 TABLET ORAL at 20:38

## 2025-01-21 RX ADMIN — GENTAMICIN SULFATE: 1 OINTMENT TOPICAL at 20:39

## 2025-01-21 RX ADMIN — OXYCODONE HYDROCHLORIDE AND ACETAMINOPHEN 1 TABLET: 7.5; 325 TABLET ORAL at 11:44

## 2025-01-21 RX ADMIN — SODIUM CHLORIDE, PRESERVATIVE FREE 10 ML: 5 INJECTION INTRAVENOUS at 11:51

## 2025-01-21 ASSESSMENT — PAIN DESCRIPTION - ORIENTATION
ORIENTATION: RIGHT;LEFT
ORIENTATION: RIGHT;LEFT

## 2025-01-21 ASSESSMENT — PAIN SCALES - GENERAL
PAINLEVEL_OUTOF10: 5
PAINLEVEL_OUTOF10: 6
PAINLEVEL_OUTOF10: 5
PAINLEVEL_OUTOF10: 7

## 2025-01-21 ASSESSMENT — PAIN DESCRIPTION - LOCATION
LOCATION: FOOT
LOCATION: FOOT

## 2025-01-21 ASSESSMENT — PAIN DESCRIPTION - PAIN TYPE: TYPE: ACUTE PAIN

## 2025-01-21 ASSESSMENT — PAIN DESCRIPTION - ONSET
ONSET: ON-GOING
ONSET: ON-GOING

## 2025-01-21 ASSESSMENT — PAIN DESCRIPTION - FREQUENCY
FREQUENCY: CONTINUOUS
FREQUENCY: CONTINUOUS

## 2025-01-21 ASSESSMENT — PAIN - FUNCTIONAL ASSESSMENT: PAIN_FUNCTIONAL_ASSESSMENT: PREVENTS OR INTERFERES SOME ACTIVE ACTIVITIES AND ADLS

## 2025-01-21 ASSESSMENT — PAIN DESCRIPTION - DESCRIPTORS
DESCRIPTORS: ACHING;THROBBING
DESCRIPTORS: ACHING;THROBBING

## 2025-01-21 NOTE — ED NOTES
Called lab about lactate collected but un-resulted at 1849. Lab states they never received specimen. Lab notified that RN will redraw.  
Jacob returned page  
Paged Dr James  
Report given to Veronique KELLY  
Report received from ROBIN Dunlap. Care assumed at this time.   
depression     Anxiety associated with depression     Back pain 07/02/2012    CAD (coronary artery disease) 02/10/2023    Chest pain 05/01/2013    Diabetic nephropathy (HCC)     Diabetic neuropathy (HCC) 12/22/2011    Diabetic ulcer of left midfoot associated with type 2 diabetes mellitus, with fat layer exposed ([facility]) 07/18/2017    Diverticulosis     C scope + Dr. Medina    DM (diabetes mellitus), type 2 ([facility]) 2002    DR. 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 Medina    Nausea and vomiting 01/11/2019    Necrotizing fasciitis     Nose fracture 1988    Panic attacks     Pericarditis 2003    Hospitalized with s/p heart cath normal    Peripheral autonomic neuropathy due to diabetes mellitus ([facility])     Axonal EMG- NCS, March 2011    Sebaceous cyst 09/01/2011    URI (upper respiratory infection) 02/27/2012    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 type 2 diabetes mellitus, with muscle involvement without evidence of necrosis ([facility]) 09/21/2021    WD-Wound, surgical, infected, initial encounter 12/08/2017    Wrist fracture 1986       Assessment    Vitals:    Level of Consciousness: Alert (0)   Vitals:    01/20/25 1759 01/20/25 1813 01/20/25 1815 01/20/25 1816   BP:   (!) 155/88    Pulse:   86    Resp:   19    Temp:  98 °F (36.7 °C)     TempSrc:  Oral     SpO2:   (!) 85% 98%   Weight: 117.9 kg (260 lb)        PO Status: Nothing by Mouth  O2 Flow Rate: O2 Device: Nasal cannula O2 Flow Rate (L/min): 3 L/min  Cardiac Rhythm: NSR  Last documented pain medication administered: See MAR  NIH Score: NIH     Active LDA's:   Peripheral IV 01/20/25 Right;Anterior Forearm (Active)       Pertinent or High Risk Medications/Drips: no   If Yes, please provide details:   Blood Product Administration:

## 2025-01-21 NOTE — CARE COORDINATION
Chart reviewed. Patient seen by CM on 12/19/24 for discharge needs. He is from home; HD patient. He has a PCP and insurance that assists with Rx when needed. CM will follow for any discharge needs.Natali Morales RN

## 2025-01-21 NOTE — ED PROVIDER NOTES
[facility] EMERGENCY DEPARTMENT  EMERGENCY DEPARTMENT ENCOUNTER        Pt Name: Yovanny Levine  MRN: [id number]  Birthdate 1975  Date of evaluation: 1/20/2025  Provider: APPLE MOTA - CNP  PCP: José Luis Izquierdo MD    RUSTY. I have evaluated this patient.        Triage CHIEF COMPLAINT       Chief Complaint   Patient presents with    Fatigue     Patient reports weakness and fatigue after finishing dialysis prior to arrival.         HISTORY OF PRESENT ILLNESS      Chief Complaint: Fatigue, shortness of breath    Yovanny Levine is a 49 y.o. male who presents for evaluation of fatigue and shortness of breath.  Patient reports that he has been feeling a little short of breath the last 2 days but after he had dialysis today he was feeling worse.  He saw Dr. Parmar nephrology who recommended that he come over and get checked out.  He denies any chest pain.  Has had a mild cough for the last couple of days but no other URI symptoms, fever.  He reports some mild increase in peripheral edema as well as abdominal distention.  Denies any abdominal pain, nausea, vomiting, diarrhea.  He does not produce much urine but has not noticed any change in this.    Nursing Notes were all reviewed and agreed with or any disagreements were addressed in the HPI.    REVIEW OF SYSTEMS     Pertinent ROS as noted in HPI.      PAST MEDICAL HISTORY     Past Medical History:   Diagnosis Date    Abscess 2010    scrotal    Acute renal failure (ARF) ([facility]) 08/04/2019    Anxiety associated with depression     Anxiety associated with depression     Back pain 07/02/2012    CAD (coronary artery disease) 02/10/2023    Chest pain 05/01/2013    Diabetic nephropathy (HCC)     Diabetic neuropathy ([facility]) 12/22/2011    Diabetic ulcer of left midfoot associated with type 2 diabetes mellitus, with fat layer exposed ([facility]) 07/18/2017    Diverticulosis     C scope + Dr. Medina    DM (diabetes mellitus), type 2 ([facility]) 2002

## 2025-01-21 NOTE — H&P
MD Esther   glucose monitoring kit (FREESTYLE) monitoring kit 1 each by Does not apply route once for 1 dose. 6/20/13 6/20/13  Kenia Rodgers MD       Physical Exam: Need 8 Elements   Physical Exam     GEN  -Awake, alert, NAD.   EYES   -PERRL.   HENT  -MM are moist.   RESP  -LS CTA equal bilat, bibasilar crackles,. Symmetric chest movement. No respiratory distress noted.  C/V  -S1/S2 auscultated. RRR without appreciable M/R/G. No JVD or carotid bruits. Peripheral pulses equal bilaterally and palpable. + peripheral edema.  GI  -Abdomen is soft, non-distended, no significant tenderness. No masses or guarding. + BS in all quadrants. Rectal exam deferred.     -No CVA tenderness. Luna catheter is not present.  MS  -B/L extremities -dressing intact, + swelling, intact sensation symmetrical.   SKIN  -Normal coloration, warm, dry.   NEURO  -awake, alert, oriented x 3, no focal deficits noted.    Past Medical History:   Reviewed patient's past medical, surgical, social, family history and allergies.      PMHx   Past Medical History:   Diagnosis Date   • Abscess 2010    scrotal   • Acute renal failure (ARF) ([facility]) 08/04/2019   • Anxiety associated with depression    • Anxiety associated with depression    • Back pain 07/02/2012   • CAD (coronary artery disease) 02/10/2023   • Chest pain 05/01/2013   • Diabetic nephropathy (HCC)    • Diabetic neuropathy ([facility]) 12/22/2011   • Diabetic ulcer of left midfoot associated with type 2 diabetes mellitus, with fat layer exposed ([facility]) 07/18/2017   • Diverticulosis     C scope + Dr. Medina   • DM (diabetes mellitus), type 2 ([facility]) 2002    DR. 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 Medina   • Nausea and vomiting 01/11/2019   • Necrotizing

## 2025-01-22 LAB
ANION GAP SERPL CALCULATED.3IONS-SCNC: 19 MMOL/L (ref 9–17)
BUN SERPL-MCNC: 87 MG/DL (ref 7–20)
CALCIUM SERPL-MCNC: 8.4 MG/DL (ref 8.3–10.6)
CHLORIDE SERPL-SCNC: 96 MMOL/L (ref 99–110)
CO2 SERPL-SCNC: 24 MMOL/L (ref 21–32)
CREAT SERPL-MCNC: 8.7 MG/DL (ref 0.9–1.3)
GFR, ESTIMATED: 7 ML/MIN/1.73M2
GLUCOSE BLD-MCNC: 138 MG/DL (ref 74–99)
GLUCOSE BLD-MCNC: 144 MG/DL (ref 74–99)
GLUCOSE BLD-MCNC: 156 MG/DL (ref 74–99)
GLUCOSE BLD-MCNC: 162 MG/DL (ref 74–99)
GLUCOSE SERPL-MCNC: 146 MG/DL (ref 74–99)
POTASSIUM SERPL-SCNC: 5.1 MMOL/L (ref 3.5–5.1)
SODIUM SERPL-SCNC: 139 MMOL/L (ref 136–145)

## 2025-01-22 PROCEDURE — 6360000002 HC RX W HCPCS: Performed by: INTERNAL MEDICINE

## 2025-01-22 PROCEDURE — 2700000000 HC OXYGEN THERAPY PER DAY

## 2025-01-22 PROCEDURE — 6370000000 HC RX 637 (ALT 250 FOR IP): Performed by: INTERNAL MEDICINE

## 2025-01-22 PROCEDURE — 96374 THER/PROPH/DIAG INJ IV PUSH: CPT

## 2025-01-22 PROCEDURE — G0378 HOSPITAL OBSERVATION PER HR: HCPCS

## 2025-01-22 PROCEDURE — 80048 BASIC METABOLIC PNL TOTAL CA: CPT

## 2025-01-22 PROCEDURE — 2500000003 HC RX 250 WO HCPCS: Performed by: INTERNAL MEDICINE

## 2025-01-22 PROCEDURE — 96372 THER/PROPH/DIAG INJ SC/IM: CPT

## 2025-01-22 PROCEDURE — 36415 COLL VENOUS BLD VENIPUNCTURE: CPT

## 2025-01-22 PROCEDURE — 6360000002 HC RX W HCPCS: Performed by: STUDENT IN AN ORGANIZED HEALTH CARE EDUCATION/TRAINING PROGRAM

## 2025-01-22 PROCEDURE — 82962 GLUCOSE BLOOD TEST: CPT

## 2025-01-22 PROCEDURE — 90935 HEMODIALYSIS ONE EVALUATION: CPT

## 2025-01-22 PROCEDURE — 94761 N-INVAS EAR/PLS OXIMETRY MLT: CPT

## 2025-01-22 PROCEDURE — 6370000000 HC RX 637 (ALT 250 FOR IP): Performed by: NURSE PRACTITIONER

## 2025-01-22 RX ORDER — MORPHINE SULFATE 2 MG/ML
2 INJECTION, SOLUTION INTRAMUSCULAR; INTRAVENOUS ONCE
Status: COMPLETED | OUTPATIENT
Start: 2025-01-22 | End: 2025-01-22

## 2025-01-22 RX ORDER — IPRATROPIUM BROMIDE AND ALBUTEROL SULFATE 2.5; .5 MG/3ML; MG/3ML
1 SOLUTION RESPIRATORY (INHALATION) EVERY 4 HOURS PRN
Status: DISCONTINUED | OUTPATIENT
Start: 2025-01-22 | End: 2025-01-28 | Stop reason: HOSPADM

## 2025-01-22 RX ORDER — ROPINIROLE 0.25 MG/1
0.25 TABLET, FILM COATED ORAL NIGHTLY
Status: DISCONTINUED | OUTPATIENT
Start: 2025-01-22 | End: 2025-01-28 | Stop reason: HOSPADM

## 2025-01-22 RX ADMIN — ATORVASTATIN CALCIUM 40 MG: 40 TABLET, FILM COATED ORAL at 20:36

## 2025-01-22 RX ADMIN — SACUBITRIL AND VALSARTAN 1 TABLET: 24; 26 TABLET, FILM COATED ORAL at 13:45

## 2025-01-22 RX ADMIN — SODIUM CHLORIDE, PRESERVATIVE FREE 10 ML: 5 INJECTION INTRAVENOUS at 13:47

## 2025-01-22 RX ADMIN — HEPARIN SODIUM 5000 UNITS: 5000 INJECTION INTRAVENOUS; SUBCUTANEOUS at 06:32

## 2025-01-22 RX ADMIN — EPOETIN ALFA-EPBX 10000 UNITS: 10000 INJECTION, SOLUTION INTRAVENOUS; SUBCUTANEOUS at 09:10

## 2025-01-22 RX ADMIN — ISOSORBIDE MONONITRATE 30 MG: 30 TABLET, EXTENDED RELEASE ORAL at 13:46

## 2025-01-22 RX ADMIN — OXYCODONE HYDROCHLORIDE AND ACETAMINOPHEN 1 TABLET: 7.5; 325 TABLET ORAL at 18:09

## 2025-01-22 RX ADMIN — HEPARIN SODIUM 5000 UNITS: 5000 INJECTION INTRAVENOUS; SUBCUTANEOUS at 13:46

## 2025-01-22 RX ADMIN — SACUBITRIL AND VALSARTAN 1 TABLET: 24; 26 TABLET, FILM COATED ORAL at 20:36

## 2025-01-22 RX ADMIN — INSULIN GLARGINE 10 UNITS: 100 INJECTION, SOLUTION SUBCUTANEOUS at 20:38

## 2025-01-22 RX ADMIN — HEPARIN SODIUM 5000 UNITS: 5000 INJECTION INTRAVENOUS; SUBCUTANEOUS at 20:38

## 2025-01-22 RX ADMIN — OXYCODONE HYDROCHLORIDE AND ACETAMINOPHEN 1 TABLET: 7.5; 325 TABLET ORAL at 06:35

## 2025-01-22 RX ADMIN — CALCITRIOL 0.5 MCG: 0.5 CAPSULE, LIQUID FILLED ORAL at 13:44

## 2025-01-22 RX ADMIN — ASPIRIN 81 MG CHEWABLE TABLET 81 MG: 81 TABLET CHEWABLE at 13:46

## 2025-01-22 RX ADMIN — HYDRALAZINE HYDROCHLORIDE 50 MG: 25 TABLET ORAL at 20:36

## 2025-01-22 RX ADMIN — CLOPIDOGREL BISULFATE 75 MG: 75 TABLET ORAL at 13:46

## 2025-01-22 RX ADMIN — HYDRALAZINE HYDROCHLORIDE 50 MG: 25 TABLET ORAL at 06:35

## 2025-01-22 RX ADMIN — METOPROLOL SUCCINATE 50 MG: 50 TABLET, EXTENDED RELEASE ORAL at 13:45

## 2025-01-22 RX ADMIN — CALCITRIOL 0.5 MCG: 0.5 CAPSULE, LIQUID FILLED ORAL at 20:36

## 2025-01-22 RX ADMIN — ANTACID TABLETS 1000 MG: 500 TABLET, CHEWABLE ORAL at 13:45

## 2025-01-22 RX ADMIN — SODIUM CHLORIDE, PRESERVATIVE FREE 10 ML: 5 INJECTION INTRAVENOUS at 20:38

## 2025-01-22 RX ADMIN — GENTAMICIN SULFATE: 1 OINTMENT TOPICAL at 20:38

## 2025-01-22 RX ADMIN — EMPAGLIFLOZIN 10 MG: 10 TABLET, FILM COATED ORAL at 13:57

## 2025-01-22 RX ADMIN — HYDRALAZINE HYDROCHLORIDE 50 MG: 25 TABLET ORAL at 13:46

## 2025-01-22 RX ADMIN — ROPINIROLE HYDROCHLORIDE 0.25 MG: 0.25 TABLET, FILM COATED ORAL at 20:36

## 2025-01-22 RX ADMIN — MORPHINE SULFATE 2 MG: 2 INJECTION, SOLUTION INTRAMUSCULAR; INTRAVENOUS at 02:21

## 2025-01-22 ASSESSMENT — PAIN DESCRIPTION - ONSET
ONSET: ON-GOING
ONSET: ON-GOING

## 2025-01-22 ASSESSMENT — PAIN SCALES - GENERAL
PAINLEVEL_OUTOF10: 5
PAINLEVEL_OUTOF10: 5
PAINLEVEL_OUTOF10: 7
PAINLEVEL_OUTOF10: 8

## 2025-01-22 ASSESSMENT — PAIN DESCRIPTION - DESCRIPTORS
DESCRIPTORS: ACHING
DESCRIPTORS: THROBBING
DESCRIPTORS: STABBING;TINGLING

## 2025-01-22 ASSESSMENT — PAIN DESCRIPTION - PAIN TYPE: TYPE: ACUTE PAIN

## 2025-01-22 ASSESSMENT — PAIN DESCRIPTION - LOCATION
LOCATION: LEG
LOCATION: BACK;FOOT
LOCATION: FOOT
LOCATION: FOOT
LOCATION: LEG

## 2025-01-22 ASSESSMENT — PAIN - FUNCTIONAL ASSESSMENT: PAIN_FUNCTIONAL_ASSESSMENT: PREVENTS OR INTERFERES SOME ACTIVE ACTIVITIES AND ADLS

## 2025-01-22 ASSESSMENT — PAIN DESCRIPTION - ORIENTATION
ORIENTATION: RIGHT;LEFT

## 2025-01-22 ASSESSMENT — PAIN DESCRIPTION - FREQUENCY
FREQUENCY: CONTINUOUS
FREQUENCY: CONTINUOUS

## 2025-01-22 NOTE — DIALYSIS
watching tv Yes   01/22/25 0845 350 ml/min 100 ml/hr 454 ml -120 mmHg 10 70 700 no s/s of distress Yes   01/22/25 0900 350 ml/min 1000 ml/hr 745 ml -130 mmHg 190 70 700 MD bedside visit Yes   01/22/25 0915 350 ml/min 1000 ml/hr 938 ml -130 mmHg 180 70 700 lines secure epo given Yes   01/22/25 0930 350 ml/min 1000 ml/hr 1220 ml -130 mmHg 180 70 700 no needs voiced Yes   01/22/25 0945 350 ml/min 1000 ml/hr 1433 ml -130 mmHg 190 70 700 pt restless in bed Yes   01/22/25 1000 350 ml/min 1000 ml/hr 1662 ml -140 mmHg 190 70 700 pt resting Yes   01/22/25 1015 350 ml/min 1000 ml/hr 1918 ml -140 mmHg 190 70 700 no s/s of distress Yes   01/22/25 1030 350 ml/min 1000 ml/hr 2170 ml -150 mmHg 180 70 700 resting quietly with eyes closed Yes   01/22/25 1045 300 ml/min 1050 ml/hr 2401 ml -120 mmHg 160 80 700 no voiced concerns Yes   01/22/25 1100 300 ml/min 1050 ml/hr 2667 ml -130 mmHg 160 70 700 pt resting with eyes closed Yes   01/22/25 1115 300 ml/min 1050 ml/hr 2870 ml -130 mmHg 150 70 700 lines secure and visible Yes   01/22/25 1130 350 ml/min 1060 ml/hr 3228 ml -160 mmHg 180 70 700 c/o hand feeeling numb ,no other distress noted Yes   01/22/25 1145 350 ml/min 1060 ml/hr 3463 ml -160 mmHg 190 70 700 resting Yes   01/22/25 1150 -- 1060 ml/hr 3500 ml -- -- -- -- txt completed Yes     Vital Signs  Patient Vitals for the past 8 hrs:   BP Temp Pulse Resp SpO2   01/22/25 0635 112/73 -- -- -- --   01/22/25 0745 (!) 142/82 97.7 °F (36.5 °C) 66 17 97 %   01/22/25 0800 129/72 98 °F (36.7 °C) 64 13 --   01/22/25 0818 126/77 -- 64 19 --   01/22/25 0830 124/83 -- 65 16 --   01/22/25 0845 124/81 -- 66 19 --   01/22/25 0900 117/79 -- 65 17 --   01/22/25 0915 113/77 -- 64 10 --   01/22/25 0930 113/74 -- 63 12 --   01/22/25 0945 111/72 -- 65 12 --   01/22/25 1000 119/76 -- 66 26 --   01/22/25 1015 112/74 -- 65 14 --   01/22/25 1030 113/71 -- 67 14 --   01/22/25 1045 (!) 130/54 -- 66 14 --   01/22/25 1100 122/74 -- 66 12 --   01/22/25 1115

## 2025-01-23 ENCOUNTER — APPOINTMENT (OUTPATIENT)
Dept: CT IMAGING | Age: 50
DRG: 280 | End: 2025-01-23
Payer: MEDICARE

## 2025-01-23 ENCOUNTER — HOSPITAL ENCOUNTER (OUTPATIENT)
Dept: WOUND CARE | Age: 50
Discharge: HOME | End: 2025-01-23
Attending: STUDENT IN AN ORGANIZED HEALTH CARE EDUCATION/TRAINING PROGRAM

## 2025-01-23 LAB
ANION GAP SERPL CALCULATED.3IONS-SCNC: 16 MMOL/L (ref 9–17)
BASOPHILS # BLD: 0.06 K/UL
BASOPHILS NFR BLD: 1 % (ref 0–1)
BUN SERPL-MCNC: 55 MG/DL (ref 7–20)
CALCIUM SERPL-MCNC: 9.4 MG/DL (ref 8.3–10.6)
CHLORIDE SERPL-SCNC: 97 MMOL/L (ref 99–110)
CO2 SERPL-SCNC: 24 MMOL/L (ref 21–32)
CREAT SERPL-MCNC: 7.1 MG/DL (ref 0.9–1.3)
EOSINOPHIL # BLD: 0.26 K/UL
EOSINOPHILS RELATIVE PERCENT: 5 % (ref 0–3)
ERYTHROCYTE [DISTWIDTH] IN BLOOD BY AUTOMATED COUNT: 16.6 % (ref 11.7–14.9)
GFR, ESTIMATED: 8 ML/MIN/1.73M2
GLUCOSE BLD-MCNC: 161 MG/DL (ref 74–99)
GLUCOSE BLD-MCNC: 172 MG/DL (ref 74–99)
GLUCOSE BLD-MCNC: 173 MG/DL (ref 74–99)
GLUCOSE BLD-MCNC: 237 MG/DL (ref 74–99)
GLUCOSE SERPL-MCNC: 144 MG/DL (ref 74–99)
HCT VFR BLD AUTO: 34.4 % (ref 42–52)
HGB BLD-MCNC: 10.8 G/DL (ref 13.5–18)
IMM GRANULOCYTES # BLD AUTO: 0.02 K/UL
IMM GRANULOCYTES NFR BLD: 0 %
LYMPHOCYTES NFR BLD: 0.61 K/UL
LYMPHOCYTES RELATIVE PERCENT: 12 % (ref 24–44)
MAGNESIUM SERPL-MCNC: 2.3 MG/DL (ref 1.8–2.4)
MCH RBC QN AUTO: 30.3 PG (ref 27–31)
MCHC RBC AUTO-ENTMCNC: 31.4 G/DL (ref 32–36)
MCV RBC AUTO: 96.4 FL (ref 78–100)
MONOCYTES NFR BLD: 0.73 K/UL
MONOCYTES NFR BLD: 14 % (ref 0–4)
NEUTROPHILS NFR BLD: 68 % (ref 36–66)
NEUTS SEG NFR BLD: 3.62 K/UL
PHOSPHATE SERPL-MCNC: 7.9 MG/DL (ref 2.5–4.9)
PLATELET # BLD AUTO: 185 K/UL (ref 140–440)
PMV BLD AUTO: 10.8 FL (ref 7.5–11.1)
POTASSIUM SERPL-SCNC: 4.8 MMOL/L (ref 3.5–5.1)
RBC # BLD AUTO: 3.57 M/UL (ref 4.6–6.2)
SODIUM SERPL-SCNC: 137 MMOL/L (ref 136–145)
WBC OTHER # BLD: 5.3 K/UL (ref 4–10.5)

## 2025-01-23 PROCEDURE — 96372 THER/PROPH/DIAG INJ SC/IM: CPT

## 2025-01-23 PROCEDURE — 90935 HEMODIALYSIS ONE EVALUATION: CPT

## 2025-01-23 PROCEDURE — 6370000000 HC RX 637 (ALT 250 FOR IP): Performed by: NURSE PRACTITIONER

## 2025-01-23 PROCEDURE — 82962 GLUCOSE BLOOD TEST: CPT

## 2025-01-23 PROCEDURE — 94761 N-INVAS EAR/PLS OXIMETRY MLT: CPT

## 2025-01-23 PROCEDURE — 84100 ASSAY OF PHOSPHORUS: CPT

## 2025-01-23 PROCEDURE — 2700000000 HC OXYGEN THERAPY PER DAY

## 2025-01-23 PROCEDURE — 2500000003 HC RX 250 WO HCPCS: Performed by: INTERNAL MEDICINE

## 2025-01-23 PROCEDURE — 6360000002 HC RX W HCPCS: Performed by: NURSE PRACTITIONER

## 2025-01-23 PROCEDURE — 6360000002 HC RX W HCPCS: Performed by: INTERNAL MEDICINE

## 2025-01-23 PROCEDURE — 80048 BASIC METABOLIC PNL TOTAL CA: CPT

## 2025-01-23 PROCEDURE — 6360000004 HC RX CONTRAST MEDICATION: Performed by: FAMILY MEDICINE

## 2025-01-23 PROCEDURE — 85025 COMPLETE CBC W/AUTO DIFF WBC: CPT

## 2025-01-23 PROCEDURE — 6370000000 HC RX 637 (ALT 250 FOR IP): Performed by: INTERNAL MEDICINE

## 2025-01-23 PROCEDURE — 36415 COLL VENOUS BLD VENIPUNCTURE: CPT

## 2025-01-23 PROCEDURE — 83735 ASSAY OF MAGNESIUM: CPT

## 2025-01-23 PROCEDURE — G0378 HOSPITAL OBSERVATION PER HR: HCPCS

## 2025-01-23 PROCEDURE — 71275 CT ANGIOGRAPHY CHEST: CPT

## 2025-01-23 RX ORDER — IOPAMIDOL 755 MG/ML
75 INJECTION, SOLUTION INTRAVASCULAR
Status: COMPLETED | OUTPATIENT
Start: 2025-01-23 | End: 2025-01-23

## 2025-01-23 RX ORDER — HYDRALAZINE HYDROCHLORIDE 25 MG/1
50 TABLET, FILM COATED ORAL 2 TIMES DAILY
Status: DISCONTINUED | OUTPATIENT
Start: 2025-01-23 | End: 2025-01-24

## 2025-01-23 RX ORDER — LOPERAMIDE HYDROCHLORIDE 2 MG/1
2 CAPSULE ORAL 4 TIMES DAILY PRN
Status: DISCONTINUED | OUTPATIENT
Start: 2025-01-23 | End: 2025-01-28 | Stop reason: HOSPADM

## 2025-01-23 RX ADMIN — HEPARIN SODIUM 5000 UNITS: 5000 INJECTION INTRAVENOUS; SUBCUTANEOUS at 21:04

## 2025-01-23 RX ADMIN — HYDRALAZINE HYDROCHLORIDE 50 MG: 25 TABLET ORAL at 05:53

## 2025-01-23 RX ADMIN — IOPAMIDOL 75 ML: 755 INJECTION, SOLUTION INTRAVENOUS at 08:33

## 2025-01-23 RX ADMIN — HEPARIN SODIUM 5000 UNITS: 5000 INJECTION INTRAVENOUS; SUBCUTANEOUS at 05:54

## 2025-01-23 RX ADMIN — INSULIN LISPRO 2 UNITS: 100 INJECTION, SOLUTION INTRAVENOUS; SUBCUTANEOUS at 16:51

## 2025-01-23 RX ADMIN — ROPINIROLE HYDROCHLORIDE 0.25 MG: 0.25 TABLET, FILM COATED ORAL at 21:05

## 2025-01-23 RX ADMIN — ATORVASTATIN CALCIUM 40 MG: 40 TABLET, FILM COATED ORAL at 21:03

## 2025-01-23 RX ADMIN — OXYCODONE HYDROCHLORIDE AND ACETAMINOPHEN 1 TABLET: 7.5; 325 TABLET ORAL at 16:51

## 2025-01-23 RX ADMIN — INSULIN GLARGINE 10 UNITS: 100 INJECTION, SOLUTION SUBCUTANEOUS at 21:04

## 2025-01-23 RX ADMIN — CALCITRIOL 0.5 MCG: 0.5 CAPSULE, LIQUID FILLED ORAL at 21:04

## 2025-01-23 RX ADMIN — SACUBITRIL AND VALSARTAN 1 TABLET: 24; 26 TABLET, FILM COATED ORAL at 21:04

## 2025-01-23 RX ADMIN — SODIUM CHLORIDE, PRESERVATIVE FREE 10 ML: 5 INJECTION INTRAVENOUS at 21:05

## 2025-01-23 RX ADMIN — CLOPIDOGREL BISULFATE 75 MG: 75 TABLET ORAL at 14:04

## 2025-01-23 RX ADMIN — ANTACID TABLETS 1000 MG: 500 TABLET, CHEWABLE ORAL at 14:05

## 2025-01-23 RX ADMIN — ASPIRIN 81 MG CHEWABLE TABLET 81 MG: 81 TABLET CHEWABLE at 14:05

## 2025-01-23 RX ADMIN — EMPAGLIFLOZIN 10 MG: 10 TABLET, FILM COATED ORAL at 14:09

## 2025-01-23 RX ADMIN — METOPROLOL SUCCINATE 50 MG: 50 TABLET, EXTENDED RELEASE ORAL at 14:05

## 2025-01-23 RX ADMIN — ISOSORBIDE MONONITRATE 30 MG: 30 TABLET, EXTENDED RELEASE ORAL at 14:04

## 2025-01-23 RX ADMIN — HEPARIN SODIUM 5000 UNITS: 5000 INJECTION INTRAVENOUS; SUBCUTANEOUS at 14:06

## 2025-01-23 RX ADMIN — GENTAMICIN SULFATE: 1 OINTMENT TOPICAL at 21:08

## 2025-01-23 RX ADMIN — OXYCODONE HYDROCHLORIDE AND ACETAMINOPHEN 1 TABLET: 7.5; 325 TABLET ORAL at 03:07

## 2025-01-23 ASSESSMENT — PAIN SCALES - GENERAL
PAINLEVEL_OUTOF10: 2
PAINLEVEL_OUTOF10: 7
PAINLEVEL_OUTOF10: 7

## 2025-01-23 ASSESSMENT — PAIN DESCRIPTION - ORIENTATION
ORIENTATION: RIGHT;LEFT
ORIENTATION: LEFT
ORIENTATION: RIGHT;LEFT

## 2025-01-23 ASSESSMENT — PAIN SCALES - WONG BAKER: WONGBAKER_NUMERICALRESPONSE: NO HURT

## 2025-01-23 ASSESSMENT — PAIN DESCRIPTION - LOCATION
LOCATION: FOOT
LOCATION: FOOT;BACK
LOCATION: FOOT

## 2025-01-23 ASSESSMENT — PAIN DESCRIPTION - ONSET
ONSET: ON-GOING
ONSET: AWAKENED FROM SLEEP

## 2025-01-23 ASSESSMENT — PAIN DESCRIPTION - FREQUENCY: FREQUENCY: CONTINUOUS

## 2025-01-23 ASSESSMENT — PAIN DESCRIPTION - DESCRIPTORS
DESCRIPTORS: ACHING
DESCRIPTORS: ACHING

## 2025-01-23 ASSESSMENT — PAIN DESCRIPTION - PAIN TYPE: TYPE: ACUTE PAIN

## 2025-01-23 NOTE — PLAN OF CARE
Problem: Chronic Conditions and Co-morbidities  Goal: Patient's chronic conditions and co-morbidity symptoms are monitored and maintained or improved  1/22/2025 2049 by Liseth Perkins RN  Outcome: Progressing  1/22/2025 0816 by Hamida Lebron RN  Outcome: Progressing     Problem: Discharge Planning  Goal: Discharge to home or other facility with appropriate resources  1/22/2025 2049 by Liseth Perkins RN  Outcome: Progressing  1/22/2025 0816 by Hamida Lebron RN  Outcome: Progressing     Problem: Pain  Goal: Verbalizes/displays adequate comfort level or baseline comfort level  1/22/2025 2049 by Liseth Perkins RN  Outcome: Progressing  1/22/2025 0816 by Hamida Lebron RN  Outcome: Progressing     Problem: Safety - Adult  Goal: Free from fall injury  1/22/2025 2049 by Liseth Perkins RN  Outcome: Progressing  1/22/2025 0816 by Hamida Lebron RN  Outcome: Progressing     Problem: Skin/Tissue Integrity  Goal: Absence of new skin breakdown  Description: 1.  Monitor for areas of redness and/or skin breakdown  2.  Assess vascular access sites hourly  3.  Every 4-6 hours minimum:  Change oxygen saturation probe site  4.  Every 4-6 hours:  If on nasal continuous positive airway pressure, respiratory therapy assess nares and determine need for appliance change or resting period.  1/22/2025 2049 by Liseth Perkins RN  Outcome: Progressing  1/22/2025 0816 by Hamida Lebron RN  Outcome: Progressing

## 2025-01-23 NOTE — PATIENT INSTRUCTIONS
PHYSICIAN ORDERS AND DISCHARGE INSTRUCTIONS     Wound Care Notes:  Sees Dr Kumar.                Applied for Kerecis grafts on 24  Donated Kerecis applied to left lateral foot 24                             Orders for this week: 2025    Right plantar : Wash with soap and water, pat dry.  A+D to Left foot   Gentamicin and stimulen powder  to wound bed   Apply whole qwick to periwound sec  Cover with ABD   Wrap BLE with coban 2 full  Leave in place for 1 week     Abdomen: Wash with soap and water, pat dry.  Shave around area   Gentamicin, clobetasol and stimulen powder  Cover with ca alginate and gentac   Leave in place for week       Leave in place til next visit to [facility]    Plan: HBO workup started 9/10/24.   [facility] discharge due to not home bound 24.   Toenails 24. Called senait about abx. No answer 10/28/24       Follow Up Instructions: 1 weeks  Primary Wound Care Provider: Shelly Rubalcava CNP  Call  for any questions or concerns.  Central [phone] for imaging lab work

## 2025-01-24 ENCOUNTER — APPOINTMENT (OUTPATIENT)
Dept: GENERAL RADIOLOGY | Age: 50
DRG: 280 | End: 2025-01-24
Payer: MEDICARE

## 2025-01-24 ENCOUNTER — APPOINTMENT (OUTPATIENT)
Dept: INTERVENTIONAL RADIOLOGY/VASCULAR | Age: 50
DRG: 280 | End: 2025-01-24
Payer: MEDICARE

## 2025-01-24 PROBLEM — J90 RECURRENT RIGHT PLEURAL EFFUSION: Status: ACTIVE | Noted: 2025-01-24

## 2025-01-24 LAB
ANION GAP SERPL CALCULATED.3IONS-SCNC: 10 MMOL/L (ref 9–17)
BUN SERPL-MCNC: 23 MG/DL (ref 7–20)
CALCIUM SERPL-MCNC: 10.1 MG/DL (ref 8.3–10.6)
CHLORIDE SERPL-SCNC: 98 MMOL/L (ref 99–110)
CO2 SERPL-SCNC: 28 MMOL/L (ref 21–32)
CREAT SERPL-MCNC: 3.6 MG/DL (ref 0.9–1.3)
GFR, ESTIMATED: 18 ML/MIN/1.73M2
GLUCOSE BLD-MCNC: 126 MG/DL (ref 74–99)
GLUCOSE BLD-MCNC: 151 MG/DL (ref 74–99)
GLUCOSE BLD-MCNC: 187 MG/DL (ref 74–99)
GLUCOSE BLD-MCNC: 196 MG/DL (ref 74–99)
GLUCOSE SERPL-MCNC: 128 MG/DL (ref 74–99)
MAGNESIUM SERPL-MCNC: 2.1 MG/DL (ref 1.8–2.4)
PHOSPHATE SERPL-MCNC: 3.3 MG/DL (ref 2.5–4.9)
POTASSIUM SERPL-SCNC: 3.8 MMOL/L (ref 3.5–5.1)
SODIUM SERPL-SCNC: 135 MMOL/L (ref 136–145)

## 2025-01-24 PROCEDURE — 80048 BASIC METABOLIC PNL TOTAL CA: CPT

## 2025-01-24 PROCEDURE — 71045 X-RAY EXAM CHEST 1 VIEW: CPT

## 2025-01-24 PROCEDURE — G0378 HOSPITAL OBSERVATION PER HR: HCPCS

## 2025-01-24 PROCEDURE — 94761 N-INVAS EAR/PLS OXIMETRY MLT: CPT

## 2025-01-24 PROCEDURE — C1729 CATH, DRAINAGE: HCPCS

## 2025-01-24 PROCEDURE — 83735 ASSAY OF MAGNESIUM: CPT

## 2025-01-24 PROCEDURE — 6370000000 HC RX 637 (ALT 250 FOR IP): Performed by: NURSE PRACTITIONER

## 2025-01-24 PROCEDURE — 2500000003 HC RX 250 WO HCPCS: Performed by: INTERNAL MEDICINE

## 2025-01-24 PROCEDURE — 32555 ASPIRATE PLEURA W/ IMAGING: CPT | Performed by: RADIOLOGY

## 2025-01-24 PROCEDURE — 6370000000 HC RX 637 (ALT 250 FOR IP): Performed by: INTERNAL MEDICINE

## 2025-01-24 PROCEDURE — 32555 ASPIRATE PLEURA W/ IMAGING: CPT

## 2025-01-24 PROCEDURE — 0W993ZZ DRAINAGE OF RIGHT PLEURAL CAVITY, PERCUTANEOUS APPROACH: ICD-10-PCS | Performed by: RADIOLOGY

## 2025-01-24 PROCEDURE — 2700000000 HC OXYGEN THERAPY PER DAY

## 2025-01-24 PROCEDURE — 90935 HEMODIALYSIS ONE EVALUATION: CPT

## 2025-01-24 PROCEDURE — 6360000002 HC RX W HCPCS: Performed by: RADIOLOGY

## 2025-01-24 PROCEDURE — 82962 GLUCOSE BLOOD TEST: CPT

## 2025-01-24 PROCEDURE — 6360000002 HC RX W HCPCS: Performed by: NURSE PRACTITIONER

## 2025-01-24 PROCEDURE — 84100 ASSAY OF PHOSPHORUS: CPT

## 2025-01-24 PROCEDURE — 6360000002 HC RX W HCPCS: Performed by: INTERNAL MEDICINE

## 2025-01-24 PROCEDURE — 1200000000 HC SEMI PRIVATE

## 2025-01-24 RX ORDER — CALCIUM CARBONATE 500 MG/1
2 TABLET, CHEWABLE ORAL
Status: DISCONTINUED | OUTPATIENT
Start: 2025-01-24 | End: 2025-01-28 | Stop reason: HOSPADM

## 2025-01-24 RX ORDER — LIDOCAINE HYDROCHLORIDE 10 MG/ML
INJECTION, SOLUTION EPIDURAL; INFILTRATION; INTRACAUDAL; PERINEURAL PRN
Status: COMPLETED | OUTPATIENT
Start: 2025-01-24 | End: 2025-01-24

## 2025-01-24 RX ORDER — ONDANSETRON 2 MG/ML
4 INJECTION INTRAMUSCULAR; INTRAVENOUS
Status: COMPLETED | OUTPATIENT
Start: 2025-01-24 | End: 2025-01-24

## 2025-01-24 RX ORDER — ONDANSETRON 4 MG/1
4 TABLET, ORALLY DISINTEGRATING ORAL EVERY 8 HOURS PRN
Status: DISCONTINUED | OUTPATIENT
Start: 2025-01-24 | End: 2025-01-28 | Stop reason: HOSPADM

## 2025-01-24 RX ORDER — TRAMADOL HYDROCHLORIDE 50 MG/1
50 TABLET ORAL ONCE
Status: COMPLETED | OUTPATIENT
Start: 2025-01-24 | End: 2025-01-24

## 2025-01-24 RX ORDER — ISOSORBIDE MONONITRATE 30 MG/1
15 TABLET, EXTENDED RELEASE ORAL DAILY
Status: DISCONTINUED | OUTPATIENT
Start: 2025-01-24 | End: 2025-01-28 | Stop reason: HOSPADM

## 2025-01-24 RX ORDER — HYDRALAZINE HYDROCHLORIDE 25 MG/1
25 TABLET, FILM COATED ORAL 2 TIMES DAILY
Status: DISCONTINUED | OUTPATIENT
Start: 2025-01-24 | End: 2025-01-28 | Stop reason: HOSPADM

## 2025-01-24 RX ADMIN — CALCITRIOL 0.5 MCG: 0.5 CAPSULE, LIQUID FILLED ORAL at 13:33

## 2025-01-24 RX ADMIN — EMPAGLIFLOZIN 10 MG: 10 TABLET, FILM COATED ORAL at 13:35

## 2025-01-24 RX ADMIN — LIDOCAINE HYDROCHLORIDE 10 ML: 10 INJECTION, SOLUTION EPIDURAL; INFILTRATION; INTRACAUDAL; PERINEURAL at 08:46

## 2025-01-24 RX ADMIN — CALCITRIOL 0.5 MCG: 0.5 CAPSULE, LIQUID FILLED ORAL at 19:50

## 2025-01-24 RX ADMIN — INSULIN LISPRO 2 UNITS: 100 INJECTION, SOLUTION INTRAVENOUS; SUBCUTANEOUS at 20:07

## 2025-01-24 RX ADMIN — OXYCODONE HYDROCHLORIDE AND ACETAMINOPHEN 1 TABLET: 7.5; 325 TABLET ORAL at 19:49

## 2025-01-24 RX ADMIN — SODIUM CHLORIDE, PRESERVATIVE FREE 10 ML: 5 INJECTION INTRAVENOUS at 13:36

## 2025-01-24 RX ADMIN — CALCIUM CARBONATE 1000 MG: 500 TABLET, CHEWABLE ORAL at 18:18

## 2025-01-24 RX ADMIN — ONDANSETRON 4 MG: 2 INJECTION INTRAMUSCULAR; INTRAVENOUS at 12:27

## 2025-01-24 RX ADMIN — HYDRALAZINE HYDROCHLORIDE 25 MG: 25 TABLET ORAL at 19:49

## 2025-01-24 RX ADMIN — INSULIN GLARGINE 10 UNITS: 100 INJECTION, SOLUTION SUBCUTANEOUS at 19:51

## 2025-01-24 RX ADMIN — CALCIUM CARBONATE 1000 MG: 500 TABLET, CHEWABLE ORAL at 13:34

## 2025-01-24 RX ADMIN — CLOPIDOGREL BISULFATE 75 MG: 75 TABLET ORAL at 13:34

## 2025-01-24 RX ADMIN — SODIUM CHLORIDE, PRESERVATIVE FREE 10 ML: 5 INJECTION INTRAVENOUS at 19:51

## 2025-01-24 RX ADMIN — ATORVASTATIN CALCIUM 40 MG: 40 TABLET, FILM COATED ORAL at 19:49

## 2025-01-24 RX ADMIN — SACUBITRIL AND VALSARTAN 1 TABLET: 24; 26 TABLET, FILM COATED ORAL at 13:35

## 2025-01-24 RX ADMIN — SACUBITRIL AND VALSARTAN 1 TABLET: 24; 26 TABLET, FILM COATED ORAL at 19:49

## 2025-01-24 RX ADMIN — HYDROMORPHONE HYDROCHLORIDE 0.5 MG: 1 INJECTION, SOLUTION INTRAMUSCULAR; INTRAVENOUS; SUBCUTANEOUS at 13:31

## 2025-01-24 RX ADMIN — ROPINIROLE HYDROCHLORIDE 0.25 MG: 0.25 TABLET, FILM COATED ORAL at 20:07

## 2025-01-24 RX ADMIN — GENTAMICIN SULFATE: 1 OINTMENT TOPICAL at 19:51

## 2025-01-24 RX ADMIN — TRAMADOL HYDROCHLORIDE 50 MG: 50 TABLET, COATED ORAL at 10:03

## 2025-01-24 RX ADMIN — ASPIRIN 81 MG CHEWABLE TABLET 81 MG: 81 TABLET CHEWABLE at 13:35

## 2025-01-24 RX ADMIN — METOPROLOL SUCCINATE 50 MG: 50 TABLET, EXTENDED RELEASE ORAL at 13:35

## 2025-01-24 RX ADMIN — OXYCODONE HYDROCHLORIDE AND ACETAMINOPHEN 1 TABLET: 7.5; 325 TABLET ORAL at 07:02

## 2025-01-24 ASSESSMENT — PAIN SCALES - GENERAL
PAINLEVEL_OUTOF10: 8
PAINLEVEL_OUTOF10: 9
PAINLEVEL_OUTOF10: 8
PAINLEVEL_OUTOF10: 5
PAINLEVEL_OUTOF10: 8

## 2025-01-24 ASSESSMENT — PAIN DESCRIPTION - DESCRIPTORS
DESCRIPTORS: ACHING;SHARP
DESCRIPTORS: ACHING;DISCOMFORT;GNAWING
DESCRIPTORS: ACHING;CRAMPING;DISCOMFORT
DESCRIPTORS: ACHING

## 2025-01-24 ASSESSMENT — PAIN - FUNCTIONAL ASSESSMENT
PAIN_FUNCTIONAL_ASSESSMENT: PREVENTS OR INTERFERES SOME ACTIVE ACTIVITIES AND ADLS

## 2025-01-24 ASSESSMENT — PAIN DESCRIPTION - LOCATION
LOCATION: FOOT;CHEST
LOCATION: OTHER (COMMENT);HEAD
LOCATION: FOOT;BACK

## 2025-01-24 ASSESSMENT — PAIN DESCRIPTION - ORIENTATION
ORIENTATION: RIGHT;LEFT;ANTERIOR;POSTERIOR
ORIENTATION: RIGHT
ORIENTATION: RIGHT;LEFT;ANTERIOR;POSTERIOR
ORIENTATION: RIGHT

## 2025-01-25 LAB
ANION GAP SERPL CALCULATED.3IONS-SCNC: 11 MMOL/L (ref 9–17)
BUN SERPL-MCNC: 39 MG/DL (ref 7–20)
CALCIUM SERPL-MCNC: 10.2 MG/DL (ref 8.3–10.6)
CHLORIDE SERPL-SCNC: 95 MMOL/L (ref 99–110)
CO2 SERPL-SCNC: 32 MMOL/L (ref 21–32)
CREAT SERPL-MCNC: 5.4 MG/DL (ref 0.9–1.3)
GFR, ESTIMATED: 11 ML/MIN/1.73M2
GLUCOSE BLD-MCNC: 168 MG/DL (ref 74–99)
GLUCOSE BLD-MCNC: 172 MG/DL (ref 74–99)
GLUCOSE BLD-MCNC: 180 MG/DL (ref 74–99)
GLUCOSE SERPL-MCNC: 171 MG/DL (ref 74–99)
MAGNESIUM SERPL-MCNC: 2.5 MG/DL (ref 1.8–2.4)
PHOSPHATE SERPL-MCNC: 6.3 MG/DL (ref 2.5–4.9)
POTASSIUM SERPL-SCNC: 4.7 MMOL/L (ref 3.5–5.1)
SODIUM SERPL-SCNC: 138 MMOL/L (ref 136–145)

## 2025-01-25 PROCEDURE — 80048 BASIC METABOLIC PNL TOTAL CA: CPT

## 2025-01-25 PROCEDURE — 6370000000 HC RX 637 (ALT 250 FOR IP): Performed by: INTERNAL MEDICINE

## 2025-01-25 PROCEDURE — 6360000002 HC RX W HCPCS: Performed by: NURSE PRACTITIONER

## 2025-01-25 PROCEDURE — 6370000000 HC RX 637 (ALT 250 FOR IP): Performed by: NURSE PRACTITIONER

## 2025-01-25 PROCEDURE — 2500000003 HC RX 250 WO HCPCS: Performed by: INTERNAL MEDICINE

## 2025-01-25 PROCEDURE — 83735 ASSAY OF MAGNESIUM: CPT

## 2025-01-25 PROCEDURE — 84100 ASSAY OF PHOSPHORUS: CPT

## 2025-01-25 PROCEDURE — 1200000000 HC SEMI PRIVATE

## 2025-01-25 PROCEDURE — 36415 COLL VENOUS BLD VENIPUNCTURE: CPT

## 2025-01-25 PROCEDURE — 94761 N-INVAS EAR/PLS OXIMETRY MLT: CPT

## 2025-01-25 PROCEDURE — 82962 GLUCOSE BLOOD TEST: CPT

## 2025-01-25 PROCEDURE — 2700000000 HC OXYGEN THERAPY PER DAY

## 2025-01-25 RX ORDER — NAPROXEN 250 MG/1
250 TABLET ORAL ONCE
Status: COMPLETED | OUTPATIENT
Start: 2025-01-25 | End: 2025-01-25

## 2025-01-25 RX ADMIN — NAPROXEN 250 MG: 250 TABLET ORAL at 17:06

## 2025-01-25 RX ADMIN — CLOPIDOGREL BISULFATE 75 MG: 75 TABLET ORAL at 09:09

## 2025-01-25 RX ADMIN — HYDRALAZINE HYDROCHLORIDE 25 MG: 25 TABLET ORAL at 09:10

## 2025-01-25 RX ADMIN — CALCIUM CARBONATE 1000 MG: 500 TABLET, CHEWABLE ORAL at 12:26

## 2025-01-25 RX ADMIN — ONDANSETRON 4 MG: 4 TABLET, ORALLY DISINTEGRATING ORAL at 20:04

## 2025-01-25 RX ADMIN — INSULIN LISPRO 2 UNITS: 100 INJECTION, SOLUTION INTRAVENOUS; SUBCUTANEOUS at 06:45

## 2025-01-25 RX ADMIN — SODIUM CHLORIDE, PRESERVATIVE FREE 10 ML: 5 INJECTION INTRAVENOUS at 20:04

## 2025-01-25 RX ADMIN — CALCITRIOL 0.5 MCG: 0.5 CAPSULE, LIQUID FILLED ORAL at 20:04

## 2025-01-25 RX ADMIN — CALCITRIOL 0.5 MCG: 0.5 CAPSULE, LIQUID FILLED ORAL at 09:09

## 2025-01-25 RX ADMIN — ONDANSETRON 4 MG: 4 TABLET, ORALLY DISINTEGRATING ORAL at 12:26

## 2025-01-25 RX ADMIN — SACUBITRIL AND VALSARTAN 1 TABLET: 24; 26 TABLET, FILM COATED ORAL at 21:38

## 2025-01-25 RX ADMIN — ATORVASTATIN CALCIUM 40 MG: 40 TABLET, FILM COATED ORAL at 20:04

## 2025-01-25 RX ADMIN — HYDROMORPHONE HYDROCHLORIDE 0.5 MG: 1 INJECTION, SOLUTION INTRAMUSCULAR; INTRAVENOUS; SUBCUTANEOUS at 09:08

## 2025-01-25 RX ADMIN — CALCIUM CARBONATE 1000 MG: 500 TABLET, CHEWABLE ORAL at 17:06

## 2025-01-25 RX ADMIN — EMPAGLIFLOZIN 10 MG: 10 TABLET, FILM COATED ORAL at 09:10

## 2025-01-25 RX ADMIN — INSULIN LISPRO 2 UNITS: 100 INJECTION, SOLUTION INTRAVENOUS; SUBCUTANEOUS at 20:20

## 2025-01-25 RX ADMIN — CALCIUM CARBONATE 1000 MG: 500 TABLET, CHEWABLE ORAL at 09:10

## 2025-01-25 RX ADMIN — NAPROXEN 250 MG: 250 TABLET ORAL at 06:45

## 2025-01-25 RX ADMIN — INSULIN GLARGINE 10 UNITS: 100 INJECTION, SOLUTION SUBCUTANEOUS at 20:20

## 2025-01-25 RX ADMIN — SODIUM CHLORIDE, PRESERVATIVE FREE 10 ML: 5 INJECTION INTRAVENOUS at 09:11

## 2025-01-25 RX ADMIN — ASPIRIN 81 MG CHEWABLE TABLET 81 MG: 81 TABLET CHEWABLE at 09:10

## 2025-01-25 RX ADMIN — SACUBITRIL AND VALSARTAN 1 TABLET: 24; 26 TABLET, FILM COATED ORAL at 09:09

## 2025-01-25 RX ADMIN — HYDRALAZINE HYDROCHLORIDE 25 MG: 25 TABLET ORAL at 20:04

## 2025-01-25 RX ADMIN — GENTAMICIN SULFATE: 1 OINTMENT TOPICAL at 09:09

## 2025-01-25 RX ADMIN — GENTAMICIN SULFATE: 1 OINTMENT TOPICAL at 21:34

## 2025-01-25 RX ADMIN — OXYCODONE HYDROCHLORIDE AND ACETAMINOPHEN 1 TABLET: 7.5; 325 TABLET ORAL at 17:10

## 2025-01-25 RX ADMIN — ROPINIROLE HYDROCHLORIDE 0.25 MG: 0.25 TABLET, FILM COATED ORAL at 20:04

## 2025-01-25 RX ADMIN — ISOSORBIDE MONONITRATE 15 MG: 30 TABLET, EXTENDED RELEASE ORAL at 09:10

## 2025-01-25 RX ADMIN — OXYCODONE HYDROCHLORIDE AND ACETAMINOPHEN 1 TABLET: 7.5; 325 TABLET ORAL at 05:06

## 2025-01-25 RX ADMIN — METOPROLOL SUCCINATE 50 MG: 50 TABLET, EXTENDED RELEASE ORAL at 09:10

## 2025-01-25 RX ADMIN — HYDROMORPHONE HYDROCHLORIDE 0.5 MG: 1 INJECTION, SOLUTION INTRAMUSCULAR; INTRAVENOUS; SUBCUTANEOUS at 01:17

## 2025-01-25 ASSESSMENT — PAIN SCALES - GENERAL
PAINLEVEL_OUTOF10: 8
PAINLEVEL_OUTOF10: 9
PAINLEVEL_OUTOF10: 0
PAINLEVEL_OUTOF10: 7
PAINLEVEL_OUTOF10: 7
PAINLEVEL_OUTOF10: 3

## 2025-01-25 ASSESSMENT — PAIN DESCRIPTION - LOCATION
LOCATION: BACK
LOCATION: RIB CAGE
LOCATION: RIB CAGE
LOCATION: RIB CAGE;FOOT
LOCATION: RIB CAGE
LOCATION: RIB CAGE

## 2025-01-25 ASSESSMENT — PAIN DESCRIPTION - ORIENTATION
ORIENTATION: RIGHT
ORIENTATION: RIGHT
ORIENTATION: RIGHT;LEFT
ORIENTATION: RIGHT

## 2025-01-25 ASSESSMENT — PAIN DESCRIPTION - DESCRIPTORS
DESCRIPTORS: CRUSHING;DISCOMFORT;PRESSURE
DESCRIPTORS: DISCOMFORT;NAGGING;SHOOTING
DESCRIPTORS: ACHING

## 2025-01-25 ASSESSMENT — PAIN - FUNCTIONAL ASSESSMENT
PAIN_FUNCTIONAL_ASSESSMENT: PREVENTS OR INTERFERES WITH ALL ACTIVE AND SOME PASSIVE ACTIVITIES
PAIN_FUNCTIONAL_ASSESSMENT: PREVENTS OR INTERFERES WITH MANY ACTIVE NOT PASSIVE ACTIVITIES
PAIN_FUNCTIONAL_ASSESSMENT: PREVENTS OR INTERFERES WITH ALL ACTIVE AND SOME PASSIVE ACTIVITIES
PAIN_FUNCTIONAL_ASSESSMENT: ACTIVITIES ARE NOT PREVENTED

## 2025-01-25 ASSESSMENT — PAIN SCALES - WONG BAKER: WONGBAKER_NUMERICALRESPONSE: NO HURT

## 2025-01-26 LAB
ANION GAP SERPL CALCULATED.3IONS-SCNC: 15 MMOL/L (ref 9–17)
BUN SERPL-MCNC: 55 MG/DL (ref 7–20)
CALCIUM SERPL-MCNC: 10 MG/DL (ref 8.3–10.6)
CHLORIDE SERPL-SCNC: 94 MMOL/L (ref 99–110)
CO2 SERPL-SCNC: 31 MMOL/L (ref 21–32)
CREAT SERPL-MCNC: 7.3 MG/DL (ref 0.9–1.3)
GFR, ESTIMATED: 8 ML/MIN/1.73M2
GLUCOSE BLD-MCNC: 123 MG/DL (ref 74–99)
GLUCOSE BLD-MCNC: 154 MG/DL (ref 74–99)
GLUCOSE BLD-MCNC: 156 MG/DL (ref 74–99)
GLUCOSE BLD-MCNC: 168 MG/DL (ref 74–99)
GLUCOSE BLD-MCNC: 178 MG/DL (ref 74–99)
GLUCOSE SERPL-MCNC: 196 MG/DL (ref 74–99)
MAGNESIUM SERPL-MCNC: 2.6 MG/DL (ref 1.8–2.4)
PHOSPHATE SERPL-MCNC: 8 MG/DL (ref 2.5–4.9)
POTASSIUM SERPL-SCNC: 5.2 MMOL/L (ref 3.5–5.1)
POTASSIUM SERPL-SCNC: 6.3 MMOL/L (ref 3.5–5.1)
SODIUM SERPL-SCNC: 139 MMOL/L (ref 136–145)

## 2025-01-26 PROCEDURE — 6360000002 HC RX W HCPCS: Performed by: INTERNAL MEDICINE

## 2025-01-26 PROCEDURE — 2500000003 HC RX 250 WO HCPCS: Performed by: INTERNAL MEDICINE

## 2025-01-26 PROCEDURE — 6370000000 HC RX 637 (ALT 250 FOR IP): Performed by: INTERNAL MEDICINE

## 2025-01-26 PROCEDURE — 2700000000 HC OXYGEN THERAPY PER DAY

## 2025-01-26 PROCEDURE — 2580000003 HC RX 258: Performed by: INTERNAL MEDICINE

## 2025-01-26 PROCEDURE — 6360000002 HC RX W HCPCS: Performed by: NURSE PRACTITIONER

## 2025-01-26 PROCEDURE — 6370000000 HC RX 637 (ALT 250 FOR IP): Performed by: NURSE PRACTITIONER

## 2025-01-26 PROCEDURE — 94640 AIRWAY INHALATION TREATMENT: CPT

## 2025-01-26 PROCEDURE — 1200000000 HC SEMI PRIVATE

## 2025-01-26 RX ORDER — GLUCAGON 1 MG/ML
1 KIT INJECTION PRN
Status: DISCONTINUED | OUTPATIENT
Start: 2025-01-26 | End: 2025-01-28 | Stop reason: HOSPADM

## 2025-01-26 RX ORDER — ALBUTEROL SULFATE 5 MG/ML
10 SOLUTION RESPIRATORY (INHALATION) ONCE
Status: COMPLETED | OUTPATIENT
Start: 2025-01-26 | End: 2025-01-26

## 2025-01-26 RX ORDER — CALCIUM GLUCONATE 20 MG/ML
1000 INJECTION, SOLUTION INTRAVENOUS ONCE
Status: COMPLETED | OUTPATIENT
Start: 2025-01-26 | End: 2025-01-26

## 2025-01-26 RX ORDER — DEXTROSE MONOHYDRATE 100 MG/ML
INJECTION, SOLUTION INTRAVENOUS CONTINUOUS PRN
Status: DISCONTINUED | OUTPATIENT
Start: 2025-01-26 | End: 2025-01-28 | Stop reason: HOSPADM

## 2025-01-26 RX ADMIN — SACUBITRIL AND VALSARTAN 1 TABLET: 24; 26 TABLET, FILM COATED ORAL at 08:36

## 2025-01-26 RX ADMIN — HYDRALAZINE HYDROCHLORIDE 25 MG: 25 TABLET ORAL at 20:06

## 2025-01-26 RX ADMIN — ROPINIROLE HYDROCHLORIDE 0.25 MG: 0.25 TABLET, FILM COATED ORAL at 20:06

## 2025-01-26 RX ADMIN — SODIUM CHLORIDE, PRESERVATIVE FREE 10 ML: 5 INJECTION INTRAVENOUS at 08:37

## 2025-01-26 RX ADMIN — OXYCODONE HYDROCHLORIDE AND ACETAMINOPHEN 1 TABLET: 7.5; 325 TABLET ORAL at 16:54

## 2025-01-26 RX ADMIN — HYDRALAZINE HYDROCHLORIDE 25 MG: 25 TABLET ORAL at 08:36

## 2025-01-26 RX ADMIN — INSULIN GLARGINE 10 UNITS: 100 INJECTION, SOLUTION SUBCUTANEOUS at 20:11

## 2025-01-26 RX ADMIN — HYDROMORPHONE HYDROCHLORIDE 0.5 MG: 1 INJECTION, SOLUTION INTRAMUSCULAR; INTRAVENOUS; SUBCUTANEOUS at 23:33

## 2025-01-26 RX ADMIN — CALCIUM CARBONATE 1000 MG: 500 TABLET, CHEWABLE ORAL at 08:36

## 2025-01-26 RX ADMIN — DEXTROSE MONOHYDRATE 250 ML: 100 INJECTION, SOLUTION INTRAVENOUS at 17:26

## 2025-01-26 RX ADMIN — SODIUM BICARBONATE 50 MEQ: 84 INJECTION INTRAVENOUS at 17:07

## 2025-01-26 RX ADMIN — INSULIN HUMAN 10 UNITS: 100 INJECTION, SOLUTION PARENTERAL at 17:47

## 2025-01-26 RX ADMIN — CLOPIDOGREL BISULFATE 75 MG: 75 TABLET ORAL at 08:36

## 2025-01-26 RX ADMIN — GENTAMICIN SULFATE: 1 OINTMENT TOPICAL at 20:10

## 2025-01-26 RX ADMIN — ATORVASTATIN CALCIUM 40 MG: 40 TABLET, FILM COATED ORAL at 20:06

## 2025-01-26 RX ADMIN — SODIUM CHLORIDE, PRESERVATIVE FREE 10 ML: 5 INJECTION INTRAVENOUS at 20:10

## 2025-01-26 RX ADMIN — OXYCODONE HYDROCHLORIDE AND ACETAMINOPHEN 1 TABLET: 7.5; 325 TABLET ORAL at 04:25

## 2025-01-26 RX ADMIN — SODIUM ZIRCONIUM CYCLOSILICATE 10 G: 10 POWDER, FOR SUSPENSION ORAL at 20:07

## 2025-01-26 RX ADMIN — SODIUM ZIRCONIUM CYCLOSILICATE 10 G: 10 POWDER, FOR SUSPENSION ORAL at 17:07

## 2025-01-26 RX ADMIN — CALCITRIOL 0.5 MCG: 0.5 CAPSULE, LIQUID FILLED ORAL at 08:36

## 2025-01-26 RX ADMIN — ALBUTEROL SULFATE 10 MG: 2.5 SOLUTION RESPIRATORY (INHALATION) at 16:13

## 2025-01-26 RX ADMIN — ISOSORBIDE MONONITRATE 15 MG: 30 TABLET, EXTENDED RELEASE ORAL at 08:36

## 2025-01-26 RX ADMIN — CALCIUM GLUCONATE 1000 MG: 20 INJECTION, SOLUTION INTRAVENOUS at 16:55

## 2025-01-26 RX ADMIN — EMPAGLIFLOZIN 10 MG: 10 TABLET, FILM COATED ORAL at 08:36

## 2025-01-26 RX ADMIN — SODIUM ZIRCONIUM CYCLOSILICATE 10 G: 10 POWDER, FOR SUSPENSION ORAL at 08:37

## 2025-01-26 RX ADMIN — METOPROLOL SUCCINATE 50 MG: 50 TABLET, EXTENDED RELEASE ORAL at 08:36

## 2025-01-26 RX ADMIN — CALCITRIOL 0.5 MCG: 0.5 CAPSULE, LIQUID FILLED ORAL at 20:06

## 2025-01-26 RX ADMIN — ASPIRIN 81 MG CHEWABLE TABLET 81 MG: 81 TABLET CHEWABLE at 08:36

## 2025-01-26 RX ADMIN — CALCIUM CARBONATE 1000 MG: 500 TABLET, CHEWABLE ORAL at 16:54

## 2025-01-26 RX ADMIN — HYDROMORPHONE HYDROCHLORIDE 0.5 MG: 1 INJECTION, SOLUTION INTRAMUSCULAR; INTRAVENOUS; SUBCUTANEOUS at 08:46

## 2025-01-26 ASSESSMENT — PAIN DESCRIPTION - LOCATION
LOCATION: BACK;FOOT
LOCATION: BACK
LOCATION: BACK
LOCATION: CHEST;FOOT

## 2025-01-26 ASSESSMENT — PAIN DESCRIPTION - DESCRIPTORS
DESCRIPTORS: DISCOMFORT
DESCRIPTORS: ACHING

## 2025-01-26 ASSESSMENT — PAIN SCALES - WONG BAKER
WONGBAKER_NUMERICALRESPONSE: NO HURT
WONGBAKER_NUMERICALRESPONSE: 8;6
WONGBAKER_NUMERICALRESPONSE: NO HURT
WONGBAKER_NUMERICALRESPONSE: HURTS EVEN MORE

## 2025-01-26 ASSESSMENT — PAIN DESCRIPTION - FREQUENCY
FREQUENCY: INTERMITTENT
FREQUENCY: INTERMITTENT

## 2025-01-26 ASSESSMENT — PAIN SCALES - GENERAL
PAINLEVEL_OUTOF10: 0
PAINLEVEL_OUTOF10: 0
PAINLEVEL_OUTOF10: 7
PAINLEVEL_OUTOF10: 6

## 2025-01-26 ASSESSMENT — PAIN DESCRIPTION - ONSET
ONSET: ON-GOING
ONSET: ON-GOING

## 2025-01-26 ASSESSMENT — PAIN - FUNCTIONAL ASSESSMENT
PAIN_FUNCTIONAL_ASSESSMENT: ACTIVITIES ARE NOT PREVENTED

## 2025-01-26 ASSESSMENT — PAIN DESCRIPTION - PAIN TYPE
TYPE: CHRONIC PAIN
TYPE: CHRONIC PAIN

## 2025-01-26 ASSESSMENT — PAIN DESCRIPTION - ORIENTATION
ORIENTATION: RIGHT;LEFT
ORIENTATION: RIGHT

## 2025-01-27 ENCOUNTER — APPOINTMENT (OUTPATIENT)
Dept: GENERAL RADIOLOGY | Age: 50
DRG: 280 | End: 2025-01-27
Payer: MEDICARE

## 2025-01-27 LAB
ANION GAP SERPL CALCULATED.3IONS-SCNC: 19 MMOL/L (ref 9–17)
BUN SERPL-MCNC: 75 MG/DL (ref 7–20)
CALCIUM SERPL-MCNC: 9.9 MG/DL (ref 8.3–10.6)
CHLORIDE SERPL-SCNC: 93 MMOL/L (ref 99–110)
CO2 SERPL-SCNC: 29 MMOL/L (ref 21–32)
CREAT SERPL-MCNC: 9.1 MG/DL (ref 0.9–1.3)
GFR, ESTIMATED: 6 ML/MIN/1.73M2
GLUCOSE BLD-MCNC: 123 MG/DL (ref 74–99)
GLUCOSE BLD-MCNC: 163 MG/DL (ref 74–99)
GLUCOSE BLD-MCNC: 181 MG/DL (ref 74–99)
GLUCOSE BLD-MCNC: 188 MG/DL (ref 74–99)
GLUCOSE BLD-MCNC: 195 MG/DL (ref 74–99)
GLUCOSE SERPL-MCNC: 169 MG/DL (ref 74–99)
MAGNESIUM SERPL-MCNC: 2.7 MG/DL (ref 1.8–2.4)
PHOSPHATE SERPL-MCNC: 9.2 MG/DL (ref 2.5–4.9)
POTASSIUM SERPL-SCNC: 5.3 MMOL/L (ref 3.5–5.1)
SODIUM SERPL-SCNC: 141 MMOL/L (ref 136–145)

## 2025-01-27 PROCEDURE — 83735 ASSAY OF MAGNESIUM: CPT

## 2025-01-27 PROCEDURE — 97530 THERAPEUTIC ACTIVITIES: CPT

## 2025-01-27 PROCEDURE — 1200000000 HC SEMI PRIVATE

## 2025-01-27 PROCEDURE — 36415 COLL VENOUS BLD VENIPUNCTURE: CPT

## 2025-01-27 PROCEDURE — 82962 GLUCOSE BLOOD TEST: CPT

## 2025-01-27 PROCEDURE — 71045 X-RAY EXAM CHEST 1 VIEW: CPT

## 2025-01-27 PROCEDURE — 90935 HEMODIALYSIS ONE EVALUATION: CPT

## 2025-01-27 PROCEDURE — 6370000000 HC RX 637 (ALT 250 FOR IP): Performed by: INTERNAL MEDICINE

## 2025-01-27 PROCEDURE — 6360000002 HC RX W HCPCS: Performed by: INTERNAL MEDICINE

## 2025-01-27 PROCEDURE — 84100 ASSAY OF PHOSPHORUS: CPT

## 2025-01-27 PROCEDURE — 80048 BASIC METABOLIC PNL TOTAL CA: CPT

## 2025-01-27 PROCEDURE — 97162 PT EVAL MOD COMPLEX 30 MIN: CPT

## 2025-01-27 PROCEDURE — 2500000003 HC RX 250 WO HCPCS: Performed by: INTERNAL MEDICINE

## 2025-01-27 PROCEDURE — 6360000002 HC RX W HCPCS: Performed by: STUDENT IN AN ORGANIZED HEALTH CARE EDUCATION/TRAINING PROGRAM

## 2025-01-27 PROCEDURE — 6370000000 HC RX 637 (ALT 250 FOR IP): Performed by: NURSE PRACTITIONER

## 2025-01-27 PROCEDURE — 97116 GAIT TRAINING THERAPY: CPT

## 2025-01-27 PROCEDURE — 97166 OT EVAL MOD COMPLEX 45 MIN: CPT

## 2025-01-27 RX ORDER — ISOSORBIDE MONONITRATE 30 MG/1
15 TABLET, EXTENDED RELEASE ORAL DAILY
Qty: 30 TABLET | Refills: 3 | Status: SHIPPED | OUTPATIENT
Start: 2025-01-27

## 2025-01-27 RX ORDER — PROCHLORPERAZINE MALEATE 10 MG
5 TABLET ORAL EVERY 6 HOURS PRN
Qty: 12 TABLET | Refills: 0 | Status: SHIPPED | OUTPATIENT
Start: 2025-01-27 | End: 2025-01-27 | Stop reason: HOSPADM

## 2025-01-27 RX ORDER — ROPINIROLE 0.25 MG/1
0.25 TABLET, FILM COATED ORAL NIGHTLY
Qty: 90 TABLET | Refills: 3 | Status: SHIPPED | OUTPATIENT
Start: 2025-01-27

## 2025-01-27 RX ORDER — PANTOPRAZOLE SODIUM 40 MG/10ML
40 INJECTION, POWDER, LYOPHILIZED, FOR SOLUTION INTRAVENOUS ONCE
Status: COMPLETED | OUTPATIENT
Start: 2025-01-27 | End: 2025-01-27

## 2025-01-27 RX ORDER — HYDRALAZINE HYDROCHLORIDE 50 MG/1
25 TABLET, FILM COATED ORAL 2 TIMES DAILY
Qty: 60 TABLET | Refills: 0 | Status: SHIPPED | OUTPATIENT
Start: 2025-01-27

## 2025-01-27 RX ORDER — PANTOPRAZOLE SODIUM 40 MG/1
40 TABLET, DELAYED RELEASE ORAL DAILY
Qty: 30 TABLET | Refills: 0 | Status: SHIPPED | OUTPATIENT
Start: 2025-01-28

## 2025-01-27 RX ORDER — PANTOPRAZOLE SODIUM 40 MG/1
40 TABLET, DELAYED RELEASE ORAL
Status: DISCONTINUED | OUTPATIENT
Start: 2025-01-28 | End: 2025-01-28 | Stop reason: HOSPADM

## 2025-01-27 RX ADMIN — PROCHLORPERAZINE EDISYLATE 10 MG: 5 INJECTION INTRAMUSCULAR; INTRAVENOUS at 07:31

## 2025-01-27 RX ADMIN — OXYCODONE HYDROCHLORIDE AND ACETAMINOPHEN 1 TABLET: 7.5; 325 TABLET ORAL at 21:27

## 2025-01-27 RX ADMIN — CLOPIDOGREL BISULFATE 75 MG: 75 TABLET ORAL at 13:34

## 2025-01-27 RX ADMIN — INSULIN LISPRO 2 UNITS: 100 INJECTION, SOLUTION INTRAVENOUS; SUBCUTANEOUS at 21:30

## 2025-01-27 RX ADMIN — INSULIN GLARGINE 10 UNITS: 100 INJECTION, SOLUTION SUBCUTANEOUS at 21:30

## 2025-01-27 RX ADMIN — CALCITRIOL 0.5 MCG: 0.5 CAPSULE, LIQUID FILLED ORAL at 21:27

## 2025-01-27 RX ADMIN — PANTOPRAZOLE SODIUM 40 MG: 40 INJECTION, POWDER, FOR SOLUTION INTRAVENOUS at 07:39

## 2025-01-27 RX ADMIN — HYDRALAZINE HYDROCHLORIDE 25 MG: 25 TABLET ORAL at 21:27

## 2025-01-27 RX ADMIN — GENTAMICIN SULFATE: 1 OINTMENT TOPICAL at 21:29

## 2025-01-27 RX ADMIN — ASPIRIN 81 MG CHEWABLE TABLET 81 MG: 81 TABLET CHEWABLE at 13:32

## 2025-01-27 RX ADMIN — ATORVASTATIN CALCIUM 40 MG: 40 TABLET, FILM COATED ORAL at 21:27

## 2025-01-27 RX ADMIN — CALCITRIOL 0.5 MCG: 0.5 CAPSULE, LIQUID FILLED ORAL at 13:32

## 2025-01-27 RX ADMIN — HYDRALAZINE HYDROCHLORIDE 25 MG: 25 TABLET ORAL at 13:34

## 2025-01-27 RX ADMIN — EPOETIN ALFA-EPBX 10000 UNITS: 10000 INJECTION, SOLUTION INTRAVENOUS; SUBCUTANEOUS at 10:28

## 2025-01-27 RX ADMIN — GENTAMICIN SULFATE: 1 OINTMENT TOPICAL at 13:37

## 2025-01-27 RX ADMIN — METOPROLOL SUCCINATE 50 MG: 50 TABLET, EXTENDED RELEASE ORAL at 13:34

## 2025-01-27 RX ADMIN — SODIUM CHLORIDE, PRESERVATIVE FREE 10 ML: 5 INJECTION INTRAVENOUS at 21:29

## 2025-01-27 RX ADMIN — EMPAGLIFLOZIN 10 MG: 10 TABLET, FILM COATED ORAL at 13:34

## 2025-01-27 RX ADMIN — INSULIN LISPRO 2 UNITS: 100 INJECTION, SOLUTION INTRAVENOUS; SUBCUTANEOUS at 18:10

## 2025-01-27 RX ADMIN — CALCIUM CARBONATE 1000 MG: 500 TABLET, CHEWABLE ORAL at 13:32

## 2025-01-27 RX ADMIN — OXYCODONE HYDROCHLORIDE AND ACETAMINOPHEN 1 TABLET: 7.5; 325 TABLET ORAL at 13:32

## 2025-01-27 RX ADMIN — ISOSORBIDE MONONITRATE 15 MG: 30 TABLET, EXTENDED RELEASE ORAL at 13:34

## 2025-01-27 RX ADMIN — ROPINIROLE HYDROCHLORIDE 0.25 MG: 0.25 TABLET, FILM COATED ORAL at 21:27

## 2025-01-27 RX ADMIN — CALCIUM CARBONATE 1000 MG: 500 TABLET, CHEWABLE ORAL at 18:11

## 2025-01-27 RX ADMIN — SODIUM CHLORIDE, PRESERVATIVE FREE 10 ML: 5 INJECTION INTRAVENOUS at 07:39

## 2025-01-27 ASSESSMENT — PAIN SCALES - WONG BAKER: WONGBAKER_NUMERICALRESPONSE: NO HURT

## 2025-01-27 ASSESSMENT — PAIN DESCRIPTION - DESCRIPTORS
DESCRIPTORS: SHARP
DESCRIPTORS: ACHING

## 2025-01-27 ASSESSMENT — PAIN SCALES - GENERAL
PAINLEVEL_OUTOF10: 2
PAINLEVEL_OUTOF10: 0
PAINLEVEL_OUTOF10: 7
PAINLEVEL_OUTOF10: 7

## 2025-01-27 ASSESSMENT — PAIN DESCRIPTION - ONSET: ONSET: ON-GOING

## 2025-01-27 ASSESSMENT — PAIN DESCRIPTION - LOCATION
LOCATION: CHEST;FOOT
LOCATION: CHEST;FOOT

## 2025-01-27 ASSESSMENT — PAIN DESCRIPTION - PAIN TYPE: TYPE: CHRONIC PAIN

## 2025-01-27 ASSESSMENT — PAIN - FUNCTIONAL ASSESSMENT
PAIN_FUNCTIONAL_ASSESSMENT: PREVENTS OR INTERFERES SOME ACTIVE ACTIVITIES AND ADLS
PAIN_FUNCTIONAL_ASSESSMENT: PREVENTS OR INTERFERES WITH ALL ACTIVE AND SOME PASSIVE ACTIVITIES

## 2025-01-27 ASSESSMENT — PAIN DESCRIPTION - ORIENTATION
ORIENTATION: LEFT;RIGHT
ORIENTATION: LEFT;RIGHT

## 2025-01-27 ASSESSMENT — PAIN DESCRIPTION - FREQUENCY: FREQUENCY: INTERMITTENT

## 2025-01-27 NOTE — DISCHARGE SUMMARY
V2.0  Discharge Summary    Name:  Yovanny Levine /Age/Sex: 1975 (49 y.o. male)   Admit Date: 2025  Discharge Date: 25    MRN & CSN:  [id number] & [id number] Encounter Date and Time 25 2:45 PM EST    Attending:  Bert Tinoco MD Discharging Provider: Sandhya Calderon PA-C       Hospital Course:     Brief HPI: Yovanny Levine is a 49 y.o. male who presented with fluid overload.    If patient not discharged, this serves as a progress note for today.    Problems addressed during this hospitalization:     Hypervolemia/fluid overload likely multifactorial related to underlying heart failure and ESRD on HD  -Patient complaining shortness of breath  -NT proBNP> 70,000, chest x-ray findings suggestive of CHF-small bilateral pleural effusions, pulmonary edema in bilateral lower lobes.  -Cardiology consulted: demand ischemia type II MI in setting of renal and heart failure, no plans for ischemic workup, follow up outpatient   - nephrology followed - added Jardiance. Adjusted doses of Imdur and hydralazine. Received dialysis while hospitalized. Nephrology cleared for discharge and recommended to resume OP dialysis MWF.     Hyperkalemia   - improved with lokelma and dialysis   - Entresto stopped     Bilateral Pleural Effusion, R>L  -CTA Chest: mild pulmonary edema with bilateral pleural effusions R>L  - am 1300 ml clear yellow pleural fluid removed during thoracentesis with Dr. Hernández, follow up CXR is normal and shows no pneumothorax  -having increased right thoracic pain after procedure, CXR shows small bilateral plerual effusions. Pain is tender to palpation, seems MSK in origin.      Coronary artery disease  -Status post PCI and stent to proximal LAD-2024  -Patient is on aspirin, Plavix, atorvastatin, metoprolol succinate, IMN     Acute on chronic systolic heart failure  -Last echo  with EF 40-45%, last heart cath was 24, PCO to LAD  -Cardiology consult as above, recommending medical

## 2025-01-27 NOTE — CARE COORDINATION
[facility] Liaison spoke w/pt, he verified demo info, is aware of discharge & will initiate HHC. Shelly at the [facility] will be following.

## 2025-01-27 NOTE — CARE COORDINATION
Received update from Sandhya SINGLETON that patient would like [facility] at discharge. Per chart review, referral has previously been sent to [facility]. Perfect serve sent to [facility] liaison.

## 2025-01-28 VITALS
OXYGEN SATURATION: 97 % | RESPIRATION RATE: 19 BRPM | DIASTOLIC BLOOD PRESSURE: 82 MMHG | HEART RATE: 62 BPM | TEMPERATURE: 98.3 F | WEIGHT: 242.95 LBS | HEIGHT: 69 IN | BODY MASS INDEX: 35.98 KG/M2 | SYSTOLIC BLOOD PRESSURE: 141 MMHG

## 2025-01-28 LAB
ANION GAP SERPL CALCULATED.3IONS-SCNC: 14 MMOL/L (ref 9–17)
BUN SERPL-MCNC: 50 MG/DL (ref 7–20)
CALCIUM SERPL-MCNC: 9.7 MG/DL (ref 8.3–10.6)
CHLORIDE SERPL-SCNC: 99 MMOL/L (ref 99–110)
CO2 SERPL-SCNC: 27 MMOL/L (ref 21–32)
CREAT SERPL-MCNC: 7.7 MG/DL (ref 0.9–1.3)
GFR, ESTIMATED: 8 ML/MIN/1.73M2
GLUCOSE BLD-MCNC: 176 MG/DL (ref 74–99)
GLUCOSE SERPL-MCNC: 167 MG/DL (ref 74–99)
MAGNESIUM SERPL-MCNC: 2.5 MG/DL (ref 1.8–2.4)
PHOSPHATE SERPL-MCNC: 6.8 MG/DL (ref 2.5–4.9)
POTASSIUM SERPL-SCNC: 5.7 MMOL/L (ref 3.5–5.1)
SODIUM SERPL-SCNC: 139 MMOL/L (ref 136–145)

## 2025-01-28 PROCEDURE — 82962 GLUCOSE BLOOD TEST: CPT

## 2025-01-28 PROCEDURE — 80048 BASIC METABOLIC PNL TOTAL CA: CPT

## 2025-01-28 PROCEDURE — 6370000000 HC RX 637 (ALT 250 FOR IP): Performed by: INTERNAL MEDICINE

## 2025-01-28 PROCEDURE — 36415 COLL VENOUS BLD VENIPUNCTURE: CPT

## 2025-01-28 PROCEDURE — 83735 ASSAY OF MAGNESIUM: CPT

## 2025-01-28 PROCEDURE — 2500000003 HC RX 250 WO HCPCS: Performed by: INTERNAL MEDICINE

## 2025-01-28 PROCEDURE — 84100 ASSAY OF PHOSPHORUS: CPT

## 2025-01-28 RX ADMIN — GENTAMICIN SULFATE: 1 OINTMENT TOPICAL at 10:11

## 2025-01-28 RX ADMIN — CLOPIDOGREL BISULFATE 75 MG: 75 TABLET ORAL at 10:06

## 2025-01-28 RX ADMIN — CALCIUM CARBONATE 1000 MG: 500 TABLET, CHEWABLE ORAL at 10:06

## 2025-01-28 RX ADMIN — CALCITRIOL 0.5 MCG: 0.5 CAPSULE, LIQUID FILLED ORAL at 10:06

## 2025-01-28 RX ADMIN — HYDRALAZINE HYDROCHLORIDE 25 MG: 25 TABLET ORAL at 10:06

## 2025-01-28 RX ADMIN — PANTOPRAZOLE SODIUM 40 MG: 40 TABLET, DELAYED RELEASE ORAL at 05:40

## 2025-01-28 RX ADMIN — OXYCODONE HYDROCHLORIDE AND ACETAMINOPHEN 1 TABLET: 7.5; 325 TABLET ORAL at 05:40

## 2025-01-28 RX ADMIN — METOPROLOL SUCCINATE 50 MG: 50 TABLET, EXTENDED RELEASE ORAL at 10:06

## 2025-01-28 RX ADMIN — SODIUM ZIRCONIUM CYCLOSILICATE 10 G: 10 POWDER, FOR SUSPENSION ORAL at 10:07

## 2025-01-28 RX ADMIN — EMPAGLIFLOZIN 10 MG: 10 TABLET, FILM COATED ORAL at 10:06

## 2025-01-28 RX ADMIN — SODIUM CHLORIDE, PRESERVATIVE FREE 10 ML: 5 INJECTION INTRAVENOUS at 10:09

## 2025-01-28 RX ADMIN — ASPIRIN 81 MG CHEWABLE TABLET 81 MG: 81 TABLET CHEWABLE at 10:06

## 2025-01-28 RX ADMIN — ISOSORBIDE MONONITRATE 15 MG: 30 TABLET, EXTENDED RELEASE ORAL at 10:06

## 2025-01-28 ASSESSMENT — PAIN DESCRIPTION - LOCATION: LOCATION: CHEST;FOOT

## 2025-01-28 ASSESSMENT — PAIN SCALES - GENERAL
PAINLEVEL_OUTOF10: 8
PAINLEVEL_OUTOF10: 2

## 2025-01-28 ASSESSMENT — PAIN DESCRIPTION - PAIN TYPE: TYPE: CHRONIC PAIN

## 2025-01-28 ASSESSMENT — PAIN DESCRIPTION - FREQUENCY: FREQUENCY: INTERMITTENT

## 2025-01-28 ASSESSMENT — PAIN DESCRIPTION - ONSET: ONSET: ON-GOING

## 2025-01-28 ASSESSMENT — PAIN - FUNCTIONAL ASSESSMENT: PAIN_FUNCTIONAL_ASSESSMENT: PREVENTS OR INTERFERES SOME ACTIVE ACTIVITIES AND ADLS

## 2025-01-28 ASSESSMENT — PAIN DESCRIPTION - ORIENTATION: ORIENTATION: LEFT;RIGHT

## 2025-01-28 ASSESSMENT — PAIN DESCRIPTION - DESCRIPTORS: DESCRIPTORS: ACHING

## 2025-01-28 NOTE — CONSULTS
Nephrology Service Consultation      [address], [address]  [place] [address]  Phone: [phone]  Office Hours: 8:30AM - 4:30PM  Monday - Friday        MEDICAL DECISION MAKING and Recommendations     Dyspnea  Hypervolemia  ESRD on HD MWF  Recent coronary stents  Anemia of ESRD  CKD-MBD  EF 45%    Suggest:  -Will plan more UF today with 3.5kg removal if BP allows but it does not make sense that he would be dyspneic despite all the UF done yesterday as outpt.  It might be wise to have cardiology involved as he never followed up with them since he was discharged after his coronary stents placement  -Start entresto for CHF    Thank you          Patient Active Problem List    Diagnosis Date Noted    Volume overload 02/22/2023    Acute on chronic respiratory failure with hypoxia 02/10/2023    CAD (coronary artery disease) 02/10/2023    Problem with vascular access 11/26/2022    ESRD (end stage renal disease) ([facility]) 09/12/2022    Diabetic foot ulcer with osteomyelitis ([facility]) 09/01/2022    NSTEMI (non-ST elevated myocardial infarction) ([facility]) 08/24/2022    Chest tightness 08/22/2022    CARMEN (acute kidney injury) ([facility]) 07/25/2022    Anemia 07/25/2022    Recurrent major depressive disorder, in full remission ([facility]) 05/18/2021    Generalized anxiety disorder 05/18/2021    Fluid overload 01/20/2025    Ulcer of abdomen wall, limited to breakdown of skin ([facility]) 01/16/2025    Diabetic ulcer of left midfoot associated with type 2 diabetes mellitus, limited to breakdown of skin ([facility]) 01/16/2025    Hypervolemia associated with renal insufficiency 01/07/2025    Diabetic ulcer of right midfoot associated with type 2 diabetes mellitus, with fat layer exposed ([facility]) 12/30/2024    Long toenail 12/30/2024    Traumatic leg ulcer, right, with fat layer exposed ([facility]) 12/19/2024    Skin ulcer of scalp with fat layer exposed ([facility]) 12/19/2024    Acute on chronic respiratory failure 12/16/2024    Diabetic ulcer of left midfoot 
  Procedure Laterality Date    ABDOMEN SURGERY      CARDIAC CATHETERIZATION  2003, 2013    Normal (Dr. Winslow)    CARDIAC PROCEDURE N/A 12/16/2024    Left heart cath / coronary angiography performed by Sherwin George MD at [facility] CARDIAC CATH LAB    CARDIAC PROCEDURE N/A 12/16/2024    Fractional flow reserve (FFR) performed by Sherwin George MD at [facility] CARDIAC CATH LAB    CARDIAC PROCEDURE N/A 12/16/2024    Insert stent gisela coronary performed by Sherwin George MD at [facility] CARDIAC CATH LAB    COLONOSCOPY  06/02/2011    Pandiverticulosis, Nonbleeding internal hemorrhoids, Repeat colonoscopy at age 50- Dr. Medina    CORONARY STENT PLACEMENT      x 3    FOOT DEBRIDEMENT Left 8/30/2023    FOOT DEBRIDEMENT INCISION AND DRAINAGE performed by Sammy Hsieh MD at [facility] OR    FOOT DEBRIDEMENT Left 4/23/2024    FOOT DEBRIDEMENT INCISION AND DRAINAGE performed by Claudy Forrest DO at [facility] OR    FOOT SURGERY Left 12/21/2021    EXCISION OF HEAD LEFT 2ND METATARSAL performed by Andrzej Webber DPM at [facility] OR    IR NONTUNNELED VASCULAR CATHETER > 5 YEARS  07/25/2022    IR NONTUNNELED VASCULAR CATHETER 7/25/2022 [facility] SPECIAL PROCEDURES    IR TUNNELED CVC PLACE WO SQ PORT/PUMP > 5 YEARS  07/27/2022    IR TUNNELED CATHETER PLACEMENT GREATER THAN 5 YEARS 7/27/2022 [facility] PROCEDURES    IR TUNNELED CVC PLACE WO SQ PORT/PUMP > 5 YEARS  11/28/2022    IR TUNNELED CATHETER PLACEMENT GREATER THAN 5 YEARS 11/28/2022 [facility] PROCEDURES    NOSE SURGERY  1993    \"had reconstruction surgery on nose a year after the other nose surgery\"    OTHER SURGICAL HISTORY Right 05/22/2015    I & D right great toe with partial amputation    OTHER SURGICAL HISTORY  12/04/2017    inc and drainage of abcess    MO INCISION BONE CORTEX FOOT Left 09/22/2018    LEFT FOOT DEBRIDEMENT INCISION AND DRAINAGE TOP AND BOTTOM performed by Jose Caba DPM at [facility] OR    SCROTAL SURGERY N/A 05/15/2021    SCROTAL AND PERINEAL DEBRIDEMENT performed by Elder 
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  Past surgical history:   has a past surgical history that includes Cardiac catheterization (2003, 2013); Tonsillectomy and adenoidectomy (1988); Upper gastrointestinal endoscopy (06/02/2011); Colonoscopy (06/02/2011); Nose surgery (1993); skin biopsy (N/A, 06/18/2013); other surgical history (Right, 05/22/2015); Toe amputation; Abdomen surgery; other surgical history (12/04/2017); pr incision bone cortex foot (Left, 09/22/2018); Upper gastrointestinal endoscopy (N/A, 01/12/2019); Toe amputation (Right, 03/23/2019); Toe amputation (Right, 08/06/2019); Scrotal surgery (N/A, 05/15/2021); Scrotal surgery (N/A, 05/16/2021); Foot surgery (Left, 12/21/2021); Upper gastrointestinal endoscopy (N/A, 07/26/2022); IR TUNNELED CVC PLACE WO SQ PORT/PUMP > 5 YEARS (07/27/2022); IR NONTUNNELED VASCULAR CATHETER > 5 YEARS (07/25/2022); Coronary stent placement; IR TUNNELED CVC PLACE WO SQ PORT/PUMP > 5 YEARS (11/28/2022); Foot Debridement (Left, 8/30/2023); Upper gastrointestinal endoscopy (N/A, 9/4/2023); Toe amputation (Left, 9/6/2023); Foot Debridement (Left, 4/23/2024); Toe amputation (Left, 4/23/2024); Cardiac procedure (N/A, 12/16/2024); Cardiac procedure (N/A, 12/16/2024); and Cardiac procedure (N/A, 12/16/2024).  Social History:   reports that he quit smoking about 2 years ago. His smoking use included cigarettes. He started smoking about 22 years ago. He has 
needs     Goals:  Short Term Goals  Time Frame for Short Term Goals: 2 weeks  Short Term Goal 1: Pt will perform sit><supine indep  Short Term Goal 2: Pt will transfer to all surfaces Anna Marie  Short Term Goal 3: Pt will ambulate 150ft with LRAD Anna Marie  Short Term Goal 4: Pt will ascend/descend 3 steps, single rail SBA  Short Term Goal 5: Pt will perform standing light dynamic activity x 3 minutes, single UE support if needed Anna Marie       Treatment plan:  Bed mobility, transfers, balance, gait, TA, TX, stairs     Recommendations for NURSING mobility: ambulate with SPC or walker to the bathroom     Time:   Time in: 1313  Time out: 1332  Timed treatment minutes:9  Total time: 19 minutes     Electronically signed by:    Naomi Horvath, [id number]  1/27/2025, 2:17 PM    
previous admissions. Has chronic rt plantar diabetic wound and abdomen wounds. Cleansed with NS measured and pictured. Applied collagen to rt foot wound. Recommend betadine to dry eschar to abdomen, lt gentamicin and Opticell. Pt is generally not at risk for skin breakdown AEB nicole.     Specialty Bed Required : no  [] Low Air Loss   [] Pressure Redistribution  [] Fluid Immersion  [] Bariatric  [] Total Pressure Relief  [] Other:     Discharge Plan:  Placement for patient upon discharge: tbd  Hospice Care: no  Patient appropriate for Outpatient Wound Care Center: active    Patient/Caregiver Teaching:  Level of patient/caregiver understanding able to: pt voiced understanding.         Electronically signed by Rosie Salvador RN,  on 1/21/2025 at 2:03 PM

## 2025-01-28 NOTE — DISCHARGE SUMMARY
V2.0  Discharge Summary    Name:  Yovanny Levine /Age/Sex: 1975 (49 y.o. male)   Admit Date: 2025  Discharge Date: 25    MRN & CSN:  [id number] & [id number] Encounter Date and Time 25 10:44 AM EST    Attending:  No att. providers found Discharging Provider: Sandhya Calderon PA-C       Hospital Course:     Brief HPI: Yovanny Levine is a 49 y.o. male who presented with hypervolemia.     Problems addressed during this hospitalization:     Hypervolemia/fluid overload likely multifactorial related to underlying heart failure and ESRD on HD  -Patient complaining shortness of breath  -NT proBNP> 70,000, chest x-ray findings suggestive of CHF-small bilateral pleural effusions, pulmonary edema in bilateral lower lobes.  -Cardiology consulted: demand ischemia type II MI in setting of renal and heart failure, no plans for ischemic workup, follow up outpatient   - nephrology followed - added Jardiance. Adjusted doses of Imdur and hydralazine. Received dialysis while hospitalized. Nephrology cleared for discharge and recommended to resume OP dialysis MWF.      Hyperkalemia   - improved with lokelma and dialysis. Persistent on discharge. Dr. James recommended Lokelma x2 doses (received one does prior to discharge) and resuming OP dialysis tomorrow. No need to recheck prior to discharge per Dr. James.   - Entresto stopped     Bilateral Pleural Effusion, R>L  -CTA Chest: mild pulmonary edema with bilateral pleural effusions R>L  -1/24 am 1300 ml clear yellow pleural fluid removed during thoracentesis with Dr. Hernández, follow up CXR is normal and shows no pneumothorax  -having increased right thoracic pain after procedure, CXR shows small bilateral plerual effusions. Pain is tender to palpation, seems MSK in origin. Improved on discharge.     Coronary artery disease  -Status post PCI and stent to proximal LAD-2024  -Patient is on aspirin, Plavix, atorvastatin, metoprolol succinate, IMN     Acute on chronic

## 2025-01-28 NOTE — PROGRESS NOTES
Hospitalist Progress Note      Name:  Yovanny Levine /Age/Sex: 1975  (49 y.o. male)   MRN & CSN:  [id number] & [id number] Admission Date/Time: 2025  6:10 PM   Location:  OBS /[id number] PCP: José Luis Izquierdo MD         Hospital Day: 4                                               Attending Physician Dr. Peck    Assessment and Plan:   Yovanny Levine is a 49 y.o.  male  who presents with Fluid overload    #.  Hypervolemia/fluid overload likely multifactorial related to underlying heart failure and ESRD on HD  -Patient complaining shortness of breath  -NT proBNP> 70,000, chest x-ray findings suggestive of CHF-small bilateral pleural effusions, pulmonary edema in bilateral lower lobes.  -Dr. James consulted from ED, plans for HD  and , added Entresto and Jardiance for CHF, to help with fluid overload issues   -Cardiology consulted: demand ischemia type II MI in setting of renal and heart failure, no plans for ischemic workup, follow up outpatient   -CTA Chest: mild pulmonary edema with bilateral pleural effusions R>L  -IR Consulted for Right Thoracentesis, Dr. Daly Edgar to perform , NPO after midnight tonight,. To hold Heparin dose tomorrow am    #.  Coronary artery disease  -Status post PCI and stent to proximal LAD-2024  -Patient is on aspirin, Plavix, atorvastatin, metoprolol succinate, IMN  -Last echo  with EF 40-45%, last heart cath was 24, PCO to LAD    #. Acute on chronic systolic heart failure  -Cardiology consult as above, recommending medical management with GDMT, follow up outpatient, continue with aspirin, plavix, statin, hydralazine, imdur, toprol xl   -Nephro added Entresto and Jardiance, will need to watch BP closely     #. Restless Leg Syndrome  -started Requip 0.25 mg PO QHS, states he slept better than he has in years on Requip      #.  History of ventricular tachycardia-2024     #.  Chronic left foot osteomyelitis  -Following with wound care   
    Hospitalist Progress Note      Name:  Yovanny Levine /Age/Sex: 1975  (49 y.o. male)   MRN & CSN:  [id number] & [id number] Admission Date/Time: 2025  6:10 PM   Location:  OBS /[id number] PCP: José Luis Izquierdo MD         Hospital Day: 3                                               Attending Physician Dr. Peck    Assessment and Plan:   Yovanny Levine is a 49 y.o.  male  who presents with Fluid overload    #.  Hypervolemia/fluid overload likely multifactorial related to underlying heart failure and ESRD on HD  -Patient complaining shortness of breath  -NT proBNP> 70,000, chest x-ray findings suggestive of CHF-small bilateral pleural effusions, pulmonary edema in bilateral lower lobes.  -Dr. James consulted from ED, plans for HD  and , added Entresto and Jardiance for CHF, to help with fluid overload issues   -Cardiology consulted: demand ischemia type II MI in setting of renal and heart failure, no plans for ischemic workup, follow up outpatient     #.  Coronary artery disease  -Status post PCI and stent to proximal LAD-2024  -Patient is on aspirin, Plavix, atorvastatin, metoprolol succinate, IMN  -Last echo  with EF 40-45%, last heart cath was 24, PCO to LAD    #. Acute on chronic systolic heart failure  -Cardiology consult as above, recommending medical management with GDMT, follow up outpatient, continue with aspirin, plavix, statin, hydralazine, imdur, toprol xl   -Nephro added Entresto and Jardiance, will need to watch BP closely     #. Restless Leg Syndrome  -Requip 0.25 mg PO QHS      #.  History of ventricular tachycardia-2024     #.  Chronic left foot osteomyelitis  -Following with wound care     #.  Hypertension  -Continue hydralazine, metoprolol succinate     #.  Diabetes mellitus type 2, on long-term insulin  -Patient is on Lantus 10 units nightly, insulin sliding scale  -Continue above with hypoglycemia protocol     #.  Diabetic foot ulcer  -Status post 
    Hospitalist Progress Note      Name:  Yovanny Levine /Age/Sex: 1975  (49 y.o. male)   MRN & CSN:  [id number] & [id number] Admission Date/Time: 2025  6:10 PM   Location:  OBS /[id number] PCP: José Luis Izquierdo MD         Hospital Day: 2                                               Attending Physician Dr. Peck    Assessment and Plan:   Yovanny Levine is a 49 y.o.  male  who presents with Fluid overload    #.  Hypervolemia/fluid overload likely multifactorial related to underlying heart failure and ESRD on HD  -Patient complaining shortness of breath  -NT proBNP> 70,000, chest x-ray findings suggestive of CHF-small bilateral pleural effusions, pulmonary edema in bilateral lower lobes.  -Dr. James consulted from ED, plans for HD , recommended cardiology evaluation  -Cardiology consulted,     #.  Coronary artery disease  -Status post PCI and stent to proximal LAD-2024  -Patient is on aspirin, Plavix, atorvastatin, metoprolol succinate, IMN  -Last echo  with EF 40-45%, last heart cath was 24, PCO to LAD    #. Acute on chronic systolic heart failure  -Cardiology consult as above, recommending medical management with GDMT, follow up outpatient, continue with aspirin, plavix, statin, hydralazine, imdur, toprol     #.  History of ventricular tachycardia-2024     #.  Chronic left foot osteomyelitis  -Following with wound care     #.  Hypertension  -Continue hydralazine, metoprolol succinate     #.  Diabetes mellitus type 2, on long-term insulin  -Patient is on Lantus 10 units nightly, insulin sliding scale  -Continue above with hypoglycemia protocol     #.  Diabetic foot ulcer  -Status post amputation of the distal phalanx of right great toe, amputation of the right fourth toe at the metatarsophalangeal joint, left great toe transmetatarsal amputation-2023     #.  ESRD on HD on calcitriol  -HD outpatient M,W,F  -Plan for HD , last session was  outpatient    #.  PUD     #.  
    V2.0  [facility] Hospitalist Progress Note      Name:  Yovanny Levine /Age/Sex: 1975  (49 y.o. male)   MRN & CSN:  [id number] & [id number] Encounter Date/Time: 2025 9:31 AM EST    Location:  [address] [place] [address]-A PCP: José Luis Izquierdo MD       Hospital Day: 7    Assessment and Plan:   Yovanny Levine is a 49 y.o. male with pmh as noted below who presents with Fluid overload      Plan:  Hypervolemia/fluid overload likely multifactorial related to underlying heart failure and ESRD on HD  -Patient complaining shortness of breath  -NT proBNP> 70,000, chest x-ray findings suggestive of CHF-small bilateral pleural effusions, pulmonary edema in bilateral lower lobes.  -Dr. James consulted from ED, plans for HD , ,  and ,  added Entresto and Jardiance for CHF, to help with fluid overload issues (keep close eye on BP as he is high risk for HYPOTENSION)  -Cardiology consulted: demand ischemia type II MI in setting of renal and heart failure, no plans for ischemic workup, follow up outpatient    no dialysis today per Dr. James, Imdur and hydralazine doses adjusted   originally plan was for home, however potassium elevated.  Potassium 5.2 this a.m., received 1 dose of Lokelma, potassium now 6.3.  Discussed with nephrology, will hold off on discharge.  Entresto held, and Lokelma routine in place per  Nephrology     #. Bilateral Pleural Effusion, R>L  -CTA Chest: mild pulmonary edema with bilateral pleural effusions R>L  -IR Consulted for Right Thoracentesis, Dr. Addy Hernández to perform , To hold Heparin dose Fri am, then restart after procedure completed   - am 1300 ml clear yellow pleural fluid removed during thoracentesis with Dr. Hernández, follow up CXR is normal and shows no pneumothorax  -having increased right thoracic pain after procedure, adding IV Dilaudid 0.5 mg Q4 prn    pleuritic pain s/p thoracentesis.  Received NSAID per nephrology.     #.  Coronary artery disease  -Status post 
    V2.0  [facility] Hospitalist Progress Note      Name:  Yovanny Levine /Age/Sex: 1975  (49 y.o. male)   MRN & CSN:  [id number] & [id number] Encounter Date/Time: 2025 8:58 AM EST    Location:  OBS  PCP: José Luis Izquierdo MD       Hospital Day: 6    Assessment and Plan:   Yovanny Levine is a 49 y.o. male with pmh as noted below who presents with Fluid overload      Plan:  Hypervolemia/fluid overload likely multifactorial related to underlying heart failure and ESRD on HD  -Patient complaining shortness of breath  -NT proBNP> 70,000, chest x-ray findings suggestive of CHF-small bilateral pleural effusions, pulmonary edema in bilateral lower lobes.  -Dr. James consulted from ED, plans for HD , ,  and ,  added Entresto and Jardiance for CHF, to help with fluid overload issues (keep close eye on BP as he is high risk for HYPOTENSION)  -Cardiology consulted: demand ischemia type II MI in setting of renal and heart failure, no plans for ischemic workup, follow up outpatient    no dialysis today per Dr. James, Imdur and hydralazine doses adjusted     #. Bilateral Pleural Effusion, R>L  -CTA Chest: mild pulmonary edema with bilateral pleural effusions R>L  -IR Consulted for Right Thoracentesis, Dr. Addy Hernández to perform , To hold Heparin dose Fri am, then restart after procedure completed   - am 1300 ml clear yellow pleural fluid removed during thoracentesis with Dr. Hernández, follow up CXR is normal and shows no pneumothorax  -having increased right thoracic pain after procedure, adding IV Dilaudid 0.5 mg Q4 prn    pleuritic pain s/p thoracentesis.  Received NSAID per nephrology.     #.  Coronary artery disease  -Status post PCI and stent to proximal LAD-2024  -Patient is on aspirin, Plavix, atorvastatin, metoprolol succinate, IMN  -Last echo  with EF 40-45%, last heart cath was 24, PCO to LAD     #. Acute on chronic systolic heart failure  -Cardiology 
 Nephrology  Dialysis Note        [address] [facility], [address]  [place] [address]  Phone: [phone]  Office Hours: 8:30AM - 4:30PM  Monday - Friday          PROCEDURE:  Patient seen during hemodialysis      PHYSICIAN:  ANTHONY      INDICATION:  End-stage renal disease      RX:  See dialysis flowsheet for specifics on access, blood flow rate, dialysate baths, duration of dialysis, anticoagulation and other technical information.      COMMENTS:  SEEN IN HD  SET TO LOSE 3KG IF BP ALLOWS    ENTRESTO AND JARDIANCE ADDED FOR BETTER CHF  MGMT, WILL HOPEFULLY HELP WITH ALL THE FLUID OVERLOAD ISSUES    Electronically signed by Melody James DO on 1/22/2025 at 9:32 AM    [facility]  MD SAMANTHA SMITH DO  [address] [facility],  [address] [place] [id number]  PHONE: [phone]  FAX: [phone]   
4 Eyes Skin Assessment     NAME:  Yovanny Levine  YOB: 1975  MEDICAL RECORD NUMBER:  [id number]    The patient is being assessed for  Admission    I agree that at least one RN has performed a thorough Head to Toe Skin Assessment on the patient. ALL assessment sites listed below have been assessed.      Areas assessed by both nurses:    Head, Face, Ears, Shoulders, Back, Chest, Arms, Elbows, Hands, Sacrum. Buttock, Coccyx, Ischium, and Legs. Feet and Heels        Does the Patient have a Wound? Yes wound(s) were present on assessment. LDA wound assessment was Initiated and completed by RN       Pradeep Prevention initiated by RN: Yes  Wound Care Orders initiated by RN: Yes    Pressure Injury (Stage 3,4, Unstageable, DTI, NWPT, and Complex wounds) if present, place Wound referral order by RN under : No    New Ostomies, if present place, Ostomy referral order under : No     Nurse 1 eSignature: Electronically signed by Liseth Perkins RN on 1/21/25 at 12:06 AM EST    **SHARE this note so that the co-signing nurse can place an eSignature**    Nurse 2 eSignature: Electronically signed by Oksana Jean RN on 1/21/25 at 12:09 AM EST   
Called notified the Hospitalist that pt does not want to be discharged this late. Does not like to go home in the dark and has no ride tonight. Wants to go home in the am. I tried to Call the transport service again line was busy, but pt pretty insistant to go home tomorrow. Even called his family on the phone and had them talk to me and they were upset he was leaving at this time. Hospitalist, ok with pt to go home in am if doesn't have a ride tonight.   
I dcd pt to home with family, I attem[facility]ped [facility] to  one time dose of Lokelma for this evening and pt refused to stop. Said he will get it later.   
IR consult received, Dr Hernández reviewed case, will plan on tomorrow.  The pt nurse Miranda KELLY and Noni Murphy NP updated.  
Nephrology Progress Note        [address] [facility], [address]  [place] [address]  Phone: [phone]  Office Hours: 8:30AM - 4:30PM  Monday - Friday 1/22/2025 7:03 AM  Subjective:   Admit Date: 1/20/2025  PCP: José Luis Izquierdo MD  Interval History:   On NC  Had some cramping yesterday    Diet: ADULT DIET; Regular; 4 carb choices (60 gm/meal); Low Sodium (2 gm); Low Potassium (Less than 3000 mg/day); Low Phosphorus (Less than 1000 mg); 1500 ml      Data:   Scheduled Meds:   sacubitril-valsartan  1 tablet Oral BID    epoetin paola-epbx  10,000 Units IntraVENous Once per day on Monday Wednesday Friday    sodium chloride flush  5-40 mL IntraVENous 2 times per day    heparin (porcine)  5,000 Units SubCUTAneous 3 times per day    aspirin  81 mg Oral Daily    atorvastatin  40 mg Oral Nightly    calcitRIOL  0.5 mcg Oral BID    calcium carbonate  2 tablet Oral Daily    clopidogrel  75 mg Oral Daily    gentamicin   Topical BID    hydrALAZINE  50 mg Oral 3 times per day    insulin glargine  10 Units SubCUTAneous Nightly    isosorbide mononitrate  30 mg Oral Daily    metoprolol succinate  50 mg Oral Daily    insulin lispro  0-8 Units SubCUTAneous 4x Daily AC & HS     Continuous Infusions:   sodium chloride      dextrose       PRN Meds:sodium chloride flush, sodium chloride, polyethylene glycol, acetaminophen **OR** acetaminophen, albuterol sulfate HFA, oxyCODONE-acetaminophen, glucose, dextrose bolus **OR** dextrose bolus, glucagon (rDNA), dextrose, prochlorperazine  I/O last 3 completed shifts:  In: 1100 [P.O.:600]  Out: 2327   No intake/output data recorded.    Intake/Output Summary (Last 24 hours) at 1/22/2025 0703  Last data filed at 1/21/2025 1736  Gross per 24 hour   Intake 1100 ml   Output 2327 ml   Net -1227 ml       CBC:   Recent Labs     01/20/25 1823 01/21/25  0901   WBC 6.2 5.7   HGB 10.1* 9.5*    151       BMP:    Recent Labs     01/20/25  1823 01/21/25  0901 01/22/25  0521   * 
Nephrology Progress Note        [address] [facility], [address]  [place] [address]  Phone: [phone]  Office Hours: 8:30AM - 4:30PM  Monday - Friday 1/25/2025 6:20 AM  Subjective:   Admit Date: 1/20/2025  PCP: José Luis Izquierdo MD  Interval History:   Reports pain with right chest wall pain with breathing and coughing, after the thoracentesis//no pneumothorax on cxr.  He had the same complaint after the thoracentesis at the previous admission  Good BP  Requip helping the RLS  Had cramps in his legs after HD    Diet: ADULT DIET; Regular; 4 carb choices (60 gm/meal); Low Sodium (2 gm); Low Potassium (Less than 3000 mg/day); Low Phosphorus (Less than 1000 mg); 1500 ml      Data:   Scheduled Meds:   calcium carbonate  2 tablet Oral TID WC    hydrALAZINE  25 mg Oral BID    isosorbide mononitrate  15 mg Oral Daily    empagliflozin  10 mg Oral Daily    rOPINIRole  0.25 mg Oral Nightly    sacubitril-valsartan  1 tablet Oral BID    epoetin paola-epbx  10,000 Units IntraVENous Once per day on Monday Wednesday Friday    sodium chloride flush  5-40 mL IntraVENous 2 times per day    aspirin  81 mg Oral Daily    atorvastatin  40 mg Oral Nightly    calcitRIOL  0.5 mcg Oral BID    clopidogrel  75 mg Oral Daily    gentamicin   Topical BID    insulin glargine  10 Units SubCUTAneous Nightly    metoprolol succinate  50 mg Oral Daily    insulin lispro  0-8 Units SubCUTAneous 4x Daily AC & HS     Continuous Infusions:   sodium chloride      dextrose       PRN Meds:ondansetron, HYDROmorphone, loperamide, ipratropium 0.5 mg-albuterol 2.5 mg, sodium chloride flush, sodium chloride, polyethylene glycol, acetaminophen **OR** acetaminophen, albuterol sulfate HFA, oxyCODONE-acetaminophen, glucose, dextrose bolus **OR** dextrose bolus, glucagon (rDNA), dextrose, prochlorperazine  I/O last 3 completed shifts:  In: 1840 [P.O.:840]  Out: 4047   I/O this shift:  In: 240 [P.O.:240]  Out: -     Intake/Output Summary (Last 24 hours) 
Nephrology Progress Note        [address] [facility], [address] [place] [address]  Phone: [phone]  Office Hours: 8:30AM - 4:30PM  Monday - Friday 1/23/2025 6:59 AM  Subjective:   Admit Date: 1/20/2025  PCP: José Luis Izquierdo MD  Interval History: REPORTS DYSPNEA    Diet: ADULT DIET; Regular; 4 carb choices (60 gm/meal); Low Sodium (2 gm); Low Potassium (Less than 3000 mg/day); Low Phosphorus (Less than 1000 mg); 1500 ml      Data:   Scheduled Meds:   hydrALAZINE  50 mg Oral BID    empagliflozin  10 mg Oral Daily    rOPINIRole  0.25 mg Oral Nightly    sacubitril-valsartan  1 tablet Oral BID    epoetin paola-epbx  10,000 Units IntraVENous Once per day on Monday Wednesday Friday    sodium chloride flush  5-40 mL IntraVENous 2 times per day    heparin (porcine)  5,000 Units SubCUTAneous 3 times per day    aspirin  81 mg Oral Daily    atorvastatin  40 mg Oral Nightly    calcitRIOL  0.5 mcg Oral BID    calcium carbonate  2 tablet Oral Daily    clopidogrel  75 mg Oral Daily    gentamicin   Topical BID    insulin glargine  10 Units SubCUTAneous Nightly    isosorbide mononitrate  30 mg Oral Daily    metoprolol succinate  50 mg Oral Daily    insulin lispro  0-8 Units SubCUTAneous 4x Daily AC & HS     Continuous Infusions:   sodium chloride      dextrose       PRN Meds:ipratropium 0.5 mg-albuterol 2.5 mg, sodium chloride flush, sodium chloride, polyethylene glycol, acetaminophen **OR** acetaminophen, albuterol sulfate HFA, oxyCODONE-acetaminophen, glucose, dextrose bolus **OR** dextrose bolus, glucagon (rDNA), dextrose, prochlorperazine  I/O last 3 completed shifts:  In: 1610 [P.O.:600; I.V.:10]  Out: 5336   No intake/output data recorded.    Intake/Output Summary (Last 24 hours) at 1/23/2025 0659  Last data filed at 1/22/2025 1347  Gross per 24 hour   Intake 510 ml   Output 3500 ml   Net -2990 ml       CBC:   Recent Labs     01/20/25  1823 01/21/25  0901   WBC 6.2 5.7   HGB 10.1* 9.5*    151 
Nephrology Progress Note        [address] [facility], [address]  [place] [address]  Phone: [phone]  Office Hours: 8:30AM - 4:30PM  Monday - Friday 1/27/2025 7:29 AM  Subjective:   Admit Date: 1/20/2025  PCP: José Luis Izquierdo MD  Interval History:   Vomiting this morning  Was hyperkalemic yesterday    Diet: ADULT DIET; Regular; 4 carb choices (60 gm/meal); Low Sodium (2 gm); Low Potassium (Less than 3000 mg/day); Low Phosphorus (Less than 1000 mg); 1500 ml      Data:   Scheduled Meds:   pantoprazole  40 mg IntraVENous Once    [START ON 1/28/2025] pantoprazole  40 mg Oral BID AC    calcium carbonate  2 tablet Oral TID WC    hydrALAZINE  25 mg Oral BID    isosorbide mononitrate  15 mg Oral Daily    empagliflozin  10 mg Oral Daily    rOPINIRole  0.25 mg Oral Nightly    epoetin paola-epbx  10,000 Units IntraVENous Once per day on Monday Wednesday Friday    sodium chloride flush  5-40 mL IntraVENous 2 times per day    aspirin  81 mg Oral Daily    atorvastatin  40 mg Oral Nightly    calcitRIOL  0.5 mcg Oral BID    clopidogrel  75 mg Oral Daily    gentamicin   Topical BID    insulin glargine  10 Units SubCUTAneous Nightly    metoprolol succinate  50 mg Oral Daily    insulin lispro  0-8 Units SubCUTAneous 4x Daily AC & HS     Continuous Infusions:   dextrose      sodium chloride       PRN Meds:glucose, dextrose bolus **OR** dextrose bolus, glucagon (rDNA), dextrose, ondansetron, HYDROmorphone, loperamide, ipratropium 0.5 mg-albuterol 2.5 mg, sodium chloride flush, sodium chloride, polyethylene glycol, acetaminophen **OR** acetaminophen, albuterol sulfate HFA, oxyCODONE-acetaminophen, glucose, prochlorperazine  I/O last 3 completed shifts:  In: 400 [P.O.:400]  Out: -   No intake/output data recorded.    Intake/Output Summary (Last 24 hours) at 1/27/2025 0729  Last data filed at 1/26/2025 2013  Gross per 24 hour   Intake 200 ml   Output --   Net 200 ml       CBC: No results for input(s): \"WBC\", \"HGB\", 
Nephrology Progress Note        [address] [facility], [address] [place] [address]  Phone: [phone]  Office Hours: 8:30AM - 4:30PM  Monday - Friday 1/24/2025 6:27 AM  Subjective:   Admit Date: 1/20/2025  PCP: José Luis Izquierdo MD  Interval History:   On NC  Low BP and cramping in HD yesterday    Diet: Diet NPO Exceptions are: Sips of Water with Meds      Data:   Scheduled Meds:   calcium carbonate  2 tablet Oral TID WC    hydrALAZINE  25 mg Oral BID    isosorbide mononitrate  15 mg Oral Daily    empagliflozin  10 mg Oral Daily    rOPINIRole  0.25 mg Oral Nightly    sacubitril-valsartan  1 tablet Oral BID    epoetin paola-epbx  10,000 Units IntraVENous Once per day on Monday Wednesday Friday    sodium chloride flush  5-40 mL IntraVENous 2 times per day    aspirin  81 mg Oral Daily    atorvastatin  40 mg Oral Nightly    calcitRIOL  0.5 mcg Oral BID    clopidogrel  75 mg Oral Daily    gentamicin   Topical BID    insulin glargine  10 Units SubCUTAneous Nightly    metoprolol succinate  50 mg Oral Daily    insulin lispro  0-8 Units SubCUTAneous 4x Daily AC & HS     Continuous Infusions:   sodium chloride      dextrose       PRN Meds:loperamide, ipratropium 0.5 mg-albuterol 2.5 mg, sodium chloride flush, sodium chloride, polyethylene glycol, acetaminophen **OR** acetaminophen, albuterol sulfate HFA, oxyCODONE-acetaminophen, glucose, dextrose bolus **OR** dextrose bolus, glucagon (rDNA), dextrose, prochlorperazine  I/O last 3 completed shifts:  In: 1610 [P.O.:600; I.V.:10]  Out: 5547   No intake/output data recorded.    Intake/Output Summary (Last 24 hours) at 1/24/2025 0627  Last data filed at 1/23/2025 2057  Gross per 24 hour   Intake 1100 ml   Output 2047 ml   Net -947 ml       CBC:   Recent Labs     01/21/25  0901 01/23/25  0748   WBC 5.7 5.3   HGB 9.5* 10.8*    185       BMP:    Recent Labs     01/21/25  0901 01/22/25  0521 01/23/25  0748   * 139 137   K 5.0 5.1 4.8   CL 93* 96* 97* 
Occupational Therapy    [facility] ACUTE CARE OCCUPATIONAL THERAPY EVALUATION    Yovanny Levine, 1975, 4130/4130-A, 1/27/2025    Discharge Recommendation: Home with initial assistance PRN, Home Health OT services (S Level 2)    History:  [place]:  The primary encounter diagnosis was Hypervolemia associated with renal insufficiency. Diagnoses of ESRD (end stage renal disease) ([facility]) and HFrEF (heart failure with reduced ejection fraction) ([facility]) were also pertinent to this visit.    Subjective:  Patient states: \"It was nice to see a [employer] victory on Monday!\"  Pain: Pt reported 8/10 pain in BL feet at rest  Communication with other providers: PT Naomi, ROBIN Alfonso, MANI Arthur  Restrictions: General Precautions, Fall Risk, Contact Precautions, 3L o2, Bed exit alarm    Home Setup/Prior level of function:  Social/Functional History  Lives With: Alone  Type of Home: House  Home Layout: One level  Home Access: Stairs to enter with rails  Entrance Stairs - Number of Steps: 3  Entrance Stairs - Rails: Right  Bathroom Shower/Tub: Walk-in shower  Bathroom Toilet: Standard  Bathroom Equipment: Shower chair, Grab bars in shower  Bathroom Accessibility: Accessible  Home Equipment: Cane, Walker - Rolling, Oxygen (3L o2)  Has the patient had two or more falls in the past year or any fall with injury in the past year?: Yes (4)  Prior Level of Assist for ADLs: Independent  Prior Level of Assist for Homemaking: Independent (increased time/effort lately)  Homemaking Responsibilities: Yes  Prior Level of Assist for Ambulation: Independent household ambulator, with or without device (mod I with cane)  Prior Level of Assist for Transfers: Independent  Active : No  Occupation: Retired  Type of Occupation:  at [employer]    Examination:  Observation: Sitting EOB upon OT arrival. Pleasant and agreeable to evaluation.  Vision: WFL  Hearing: WFL  Vitals: Stable vitals throughout session on 3L 
Outpatient Pharmacy Progress Note for Meds-to-Beds    Total number of Prescriptions Filled: 0    Additional Documentation:  Patient was discharged without his prescription for Lokelma. Pharmacy attempted to call patient but no answer.      Thank you for letting us serve your patients.  [facility] [place] [id number]    Phone: [phone]    Fax: [phone]        
Patient Name: Yovanny Levine  Patient : 1975  MRN: [id number]     Acct: [id number]  Date of Admission: 2025  Room/Bed: OBS /JNC39-21  Code Status:  Full Code  Allergies:   Allergies   Allergen Reactions    Adhesive Tape Rash    Doxycycline Nausea And Vomiting    Reglan [Metoclopramide] Anxiety     Diagnosis:    Patient Active Problem List   Diagnosis    Hiatal hernia    Diabetes mellitus type 2, improved controlled    Diabetic neuropathy ([facility])    Panic attack    Anxiety associated with depression    Neck pain    DANIELA (obstructive sleep apnea)    Urinary frequency    Bilateral carpal tunnel syndrome- left worse than right    Hyperlipidemia with target LDL less than 100    Essential hypertension    Bronchitis    Osteomyelitis    Abscess    Periumbilical hernia    Sepsis ([facility])    Cellulitis of left foot    Constipation    Intractable nausea and vomiting    Uncontrolled type 2 diabetes mellitus    Nausea and vomiting    Diabetic foot infection ([facility])    Acute renal failure (ARF) ([facility])    Cellulitis    Cellulitis of left arm    Cellulitis, scrotum    Acute epididymitis    Irene gangrene    Recurrent major depressive disorder, in full remission ([facility])    Generalized anxiety disorder    Necrotizing fasciitis    Syncope    Right groin wound    Nephrotic syndrome    Generalized edema    Stage 3b chronic kidney disease ([facility])    Diabetic nephropathy associated with type 2 diabetes mellitus ([facility])    Hypoalbuminemia    Chronic kidney disease, stage IV (severe) ([facility])    FH: CAD (coronary artery disease)    ASCVD (arteriosclerotic cardiovascular disease)    Other proteinuria    Chest pain    Surgical wound dehiscence    CARMEN (acute kidney injury) ([facility])    Anemia    Chest tightness    NSTEMI (non-ST elevated myocardial infarction) ([facility])    Diabetic foot ulcer with osteomyelitis ([facility])    ESRD (end stage renal disease) ([facility])    Problem with vascular access    Acute on chronic respiratory failure with hypoxia    CAD 
Patient Name: Yovanny Levine  Patient : 1975  MRN: [id number]     Acct: [id number]  Date of Admission: 2025  Room/Bed: 4130/4130-A  Code Status:  Full Code  Allergies:   Allergies   Allergen Reactions    Adhesive Tape Rash    Doxycycline Nausea And Vomiting    Reglan [Metoclopramide] Anxiety     Diagnosis:    Patient Active Problem List   Diagnosis    Hiatal hernia    Diabetes mellitus type 2, improved controlled    Diabetic neuropathy ([facility])    Panic attack    Anxiety associated with depression    Neck pain    DANIELA (obstructive sleep apnea)    Urinary frequency    Bilateral carpal tunnel syndrome- left worse than right    Hyperlipidemia with target LDL less than 100    Essential hypertension    Bronchitis    Osteomyelitis    Abscess    Periumbilical hernia    Sepsis ([facility])    Cellulitis of left foot    Constipation    Intractable nausea and vomiting    Uncontrolled type 2 diabetes mellitus    Nausea and vomiting    Diabetic foot infection ([facility])    Acute renal failure (ARF) ([facility])    Cellulitis    Cellulitis of left arm    Cellulitis, scrotum    Acute epididymitis    Irene gangrene    Recurrent major depressive disorder, in full remission ([facility])    Generalized anxiety disorder    Necrotizing fasciitis    Syncope    Right groin wound    Nephrotic syndrome    Generalized edema    Stage 3b chronic kidney disease ([facility])    Diabetic nephropathy associated with type 2 diabetes mellitus ([facility])    Hypoalbuminemia    Chronic kidney disease, stage IV (severe) ([facility])    FH: CAD (coronary artery disease)    ASCVD (arteriosclerotic cardiovascular disease)    Other proteinuria    Chest pain    Surgical wound dehiscence    CARMEN (acute kidney injury) ([facility])    Anemia    Chest tightness    NSTEMI (non-ST elevated myocardial infarction) ([facility])    Diabetic foot ulcer with osteomyelitis ([facility])    ESRD (end stage renal disease) ([facility])    Problem with vascular access    Acute on chronic respiratory failure with hypoxia    CAD 
Patient complaints of nausea, DR James notified and orders rec'd for zofran,  second x-ray completed    
TRANSFER - OUT REPORT: 1300 ml clear yellow plueral fluid removed, pt tolerated procedure well, VSS, denies pain, dressing clean dry and intact. Chest x ray ordered per protocol.     Verbal report given to Angélica KELLY on Yovanny Lveine      Report consisted of patient's Situation, Background, Assessment and   Recommendations(SBAR).     Information from the following report(s) Nurse Handoff Report was reviewed with the receiving nurse.    Opportunity for questions and clarification was provided.      Patient transported with:   Registered Nurse    
Tolerated 2.5 hour treatment well, removed 1.5 liters with treatment.  Left arm cannulated without complication.  UF off last 30 minutes of treatment.        Patient Name: Yovanny Levine  Patient : 1975  MRN: [id number]     Acct: [id number]  Date of Admission: 2025  Room/Bed: OBS 05/ZNZ54-84  Code Status:  Full Code  Allergies:   Allergies   Allergen Reactions    Adhesive Tape Rash    Doxycycline Nausea And Vomiting    Reglan [Metoclopramide] Anxiety     Diagnosis:    Patient Active Problem List   Diagnosis    Hiatal hernia    Diabetes mellitus type 2, improved controlled    Diabetic neuropathy (HCC)    Panic attack    Anxiety associated with depression    Neck pain    DANIELA (obstructive sleep apnea)    Urinary frequency    Bilateral carpal tunnel syndrome- left worse than right    Hyperlipidemia with target LDL less than 100    Essential hypertension    Bronchitis    Osteomyelitis    Abscess    Periumbilical hernia    Sepsis (HCC)    Cellulitis of left foot    Constipation    Intractable nausea and vomiting    Uncontrolled type 2 diabetes mellitus    Nausea and vomiting    Diabetic foot infection (HCC)    Acute renal failure (ARF) ([facility])    Cellulitis    Cellulitis of left arm    Cellulitis, scrotum    Acute epididymitis    Irene gangrene    Recurrent major depressive disorder, in full remission ([facility])    Generalized anxiety disorder    Necrotizing fasciitis    Syncope    Right groin wound    Nephrotic syndrome    Generalized edema    Stage 3b chronic kidney disease (HCC)    Diabetic nephropathy associated with type 2 diabetes mellitus (HCC)    Hypoalbuminemia    Chronic kidney disease, stage IV (severe) ([facility])    FH: CAD (coronary artery disease)    ASCVD (arteriosclerotic cardiovascular disease)    Other proteinuria    Chest pain    Surgical wound dehiscence    CARMEN (acute kidney injury) (HCC)    Anemia    Chest tightness    NSTEMI (non-ST elevated myocardial infarction) ([facility])    Diabetic foot ulcer 
Treatment ended early d/t patient cramping. Dr leon notified and new order to end treatment. 2 liters taken off, tolerated treatment fair.    Patient Name: Yovanny Levine  Patient : 1975  MRN: [id number]     Acct: [id number]  Date of Admission: 2025  Room/Bed: OBS 05/ZYN37-58  Code Status:  Full Code  Allergies:   Allergies   Allergen Reactions    Adhesive Tape Rash    Doxycycline Nausea And Vomiting    Reglan [Metoclopramide] Anxiety     Diagnosis:    Patient Active Problem List   Diagnosis    Hiatal hernia    Diabetes mellitus type 2, improved controlled    Diabetic neuropathy (HCC)    Panic attack    Anxiety associated with depression    Neck pain    DANIELA (obstructive sleep apnea)    Urinary frequency    Bilateral carpal tunnel syndrome- left worse than right    Hyperlipidemia with target LDL less than 100    Essential hypertension    Bronchitis    Osteomyelitis    Abscess    Periumbilical hernia    Sepsis ([facility])    Cellulitis of left foot    Constipation    Intractable nausea and vomiting    Uncontrolled type 2 diabetes mellitus    Nausea and vomiting    Diabetic foot infection ([facility])    Acute renal failure (ARF) ([facility])    Cellulitis    Cellulitis of left arm    Cellulitis, scrotum    Acute epididymitis    Irene gangrene    Recurrent major depressive disorder, in full remission ([facility])    Generalized anxiety disorder    Necrotizing fasciitis    Syncope    Right groin wound    Nephrotic syndrome    Generalized edema    Stage 3b chronic kidney disease (HCC)    Diabetic nephropathy associated with type 2 diabetes mellitus ([facility])    Hypoalbuminemia    Chronic kidney disease, stage IV (severe) ([facility])    FH: CAD (coronary artery disease)    ASCVD (arteriosclerotic cardiovascular disease)    Other proteinuria    Chest pain    Surgical wound dehiscence    CARMEN (acute kidney injury) ([facility])    Anemia    Chest tightness    NSTEMI (non-ST elevated myocardial infarction) ([facility])    Diabetic foot ulcer with 
      BMP:    Recent Labs     01/24/25  1152 01/25/25  0850 01/26/25  0234   * 138 139   K 3.8 4.7 5.2*   CL 98* 95* 94*   CO2 28 32 31   BUN 23* 39* 55*   CREATININE 3.6* 5.4* 7.3*   GLUCOSE 128* 171* 196*   CALCIUM 10.1 10.2 10.0       Objective:   Vitals: /68   Pulse 60   Temp 97.5 °F (36.4 °C) (Oral)   Resp 14   Ht 1.753 m (5' 9\")   Wt 110.2 kg (242 lb 15.2 oz)   SpO2 100%   BMI 35.88 kg/m²   General appearance: alert and cooperative with exam, in no acute distress  HEENT: normocephalic, atraumatic,   Neck: supple, trachea midline  Lungs: breathing comfortably on nc  Extremities: extremities atraumatic, no cyanosis or edema  Neurologic: alert, oriented, follows commands, interactive    MEDICAL DECISION MAKING     -Dyspnea  -Hypervolemia  -Bilateral pleural effusions: s/p 1300ml right thoracentesis on 1/24/25  -Pleuritic pain after thoracentesis; No pneumothorax on cxr  -ESRD on HD MWF  -Recent coronary stents  -Anemia of ESRD  -CKD-MBD  -EF 45%  -RLS: requip helping     Suggest:  -K 5.2 so give lokelma  -He had some vomiting yesterday, could have been from the naproxen so will not give it anymore  -If you do not want ESRD pts to be discharged after HD on their HD days then it would be best to dc him today so he can go to his outpt HD ctr for his early morning HD session on monday  -Continue entresto and jardiance on dc  -Low dosehydralazine and imdur er doses to allow a higher bp  -Tums as phos binders so give with foods                  Electronically signed by Melody James DO on 1/26/2025 at 7:39 AM    [facility]  MD SAMANTHA SMITH DO  [address],  [address]  [place] [address]  PHONE: [phone]  FAX: [phone]       
input(s): \"WBC\", \"HGB\", \"PLT\" in the last 72 hours.    BMP:    Recent Labs     01/26/25  0234 01/26/25  1358 01/27/25  0521 01/28/25  0807     --  141 139   K 5.2* 6.3* 5.3* 5.7*   CL 94*  --  93* 99   CO2 31  --  29 27   BUN 55*  --  75* 50*   CREATININE 7.3*  --  9.1* 7.7*   GLUCOSE 196*  --  169* 167*   CALCIUM 10.0  --  9.9 9.7         Objective:   Vitals: BP (!) 146/86   Pulse 58   Temp 98.6 °F (37 °C) (Oral)   Resp 18   Ht 1.753 m (5' 9\")   Wt 110.2 kg (242 lb 15.2 oz)   SpO2 95%   BMI 35.88 kg/m²   General appearance: alert and cooperative with exam, in no acute distress  HEENT: normocephalic, atraumatic,   Neck: supple, trachea midline  Lungs:  breathing comfortably on nc  Extremities: extremities atraumatic, no cyanosis or edema  Neurologic: alert, oriented, follows commands, interactive    MEDICAL DECISION MAKING     -Dyspnea  -Hypervolemia  -Bilateral pleural effusions: s/p 1300ml right thoracentesis on 1/24/25  -Pleuritic pain after thoracentesis; No pneumothorax on cxr  -ESRD on HD MWF  -Recent coronary stents  -Anemia of ESRD  -CKD-MBD  -EF 45%  -RLS: requip helping  -Emesis today     Suggest:  -HD planned tomorrow  -Give a dose of lokelma today  -He needs to go to the outpt HD ctr tomorrow if he gets discharged today                        Electronically signed by Melody James DO on 1/28/2025 at 9:29 AM    [facility]  MD SAMANTHA SMITH DO  [address],  [address] [place] [address]  PHONE: [phone]  FAX: [phone]       
  Hospitalist  1/24/2025, 8:03 AM

## 2025-01-29 ENCOUNTER — TELEPHONE (OUTPATIENT)
Dept: WOUND CARE | Age: 50
End: 2025-01-29

## 2025-01-29 NOTE — TELEPHONE ENCOUNTER
pt # not accepting calls, left message on son's # to have pt return call to reschedule 1/30/25 appt

## 2025-02-06 ENCOUNTER — HOSPITAL ENCOUNTER (OUTPATIENT)
Dept: WOUND CARE | Age: 50
Discharge: HOME | End: 2025-02-06
Attending: STUDENT IN AN ORGANIZED HEALTH CARE EDUCATION/TRAINING PROGRAM
Payer: MEDICARE

## 2025-02-06 VITALS
DIASTOLIC BLOOD PRESSURE: 79 MMHG | TEMPERATURE: 97.8 F | SYSTOLIC BLOOD PRESSURE: 142 MMHG | RESPIRATION RATE: 18 BRPM | HEART RATE: 71 BPM

## 2025-02-06 DIAGNOSIS — Z89.422 ABSENCE OF TOE OF LEFT FOOT (HCC): ICD-10-CM

## 2025-02-06 DIAGNOSIS — E11.622 TYPE 2 DIABETES MELLITUS WITH OTHER SKIN ULCER, WITH LONG-TERM CURRENT USE OF INSULIN (HCC): ICD-10-CM

## 2025-02-06 DIAGNOSIS — Z79.4 TYPE 2 DIABETES MELLITUS WITH OTHER SKIN ULCER, WITH LONG-TERM CURRENT USE OF INSULIN (HCC): ICD-10-CM

## 2025-02-06 DIAGNOSIS — L98.491 ULCER OF ABDOMEN WALL, LIMITED TO BREAKDOWN OF SKIN (HCC): Primary | ICD-10-CM

## 2025-02-06 DIAGNOSIS — L84 PRE-ULCERATIVE CALLUSES: ICD-10-CM

## 2025-02-06 DIAGNOSIS — G89.4 CHRONIC PAIN SYNDROME: ICD-10-CM

## 2025-02-06 DIAGNOSIS — L60.2 LONG TOENAIL: ICD-10-CM

## 2025-02-06 PROCEDURE — 6370000000 HC RX 637 (ALT 250 FOR IP): Performed by: SURGERY

## 2025-02-06 PROCEDURE — 11042 DBRDMT SUBQ TIS 1ST 20SQCM/<: CPT

## 2025-02-06 PROCEDURE — 11056 PARNG/CUTG B9 HYPRKR LES 2-4: CPT

## 2025-02-06 PROCEDURE — 11719 TRIM NAIL(S) ANY NUMBER: CPT

## 2025-02-06 RX ORDER — BETAMETHASONE DIPROPIONATE 0.5 MG/G
CREAM TOPICAL ONCE
OUTPATIENT
Start: 2025-02-06 | End: 2025-02-06

## 2025-02-06 RX ORDER — GENTAMICIN SULFATE 1 MG/G
OINTMENT TOPICAL ONCE
OUTPATIENT
Start: 2025-02-06 | End: 2025-02-06

## 2025-02-06 RX ORDER — LIDOCAINE 40 MG/G
CREAM TOPICAL ONCE
OUTPATIENT
Start: 2025-02-06 | End: 2025-02-06

## 2025-02-06 RX ORDER — LIDOCAINE HYDROCHLORIDE 40 MG/ML
SOLUTION TOPICAL ONCE
OUTPATIENT
Start: 2025-02-06 | End: 2025-02-06

## 2025-02-06 RX ORDER — SODIUM CHLOR/HYPOCHLOROUS ACID 0.033 %
SOLUTION, IRRIGATION IRRIGATION ONCE
OUTPATIENT
Start: 2025-02-06 | End: 2025-02-06

## 2025-02-06 RX ORDER — BACITRACIN ZINC AND POLYMYXIN B SULFATE 500; 1000 [USP'U]/G; [USP'U]/G
OINTMENT TOPICAL ONCE
OUTPATIENT
Start: 2025-02-06 | End: 2025-02-06

## 2025-02-06 RX ORDER — LIDOCAINE 50 MG/G
OINTMENT TOPICAL ONCE
OUTPATIENT
Start: 2025-02-06 | End: 2025-02-06

## 2025-02-06 RX ORDER — SILVER SULFADIAZINE 10 MG/G
CREAM TOPICAL ONCE
OUTPATIENT
Start: 2025-02-06 | End: 2025-02-06

## 2025-02-06 RX ORDER — CLOBETASOL PROPIONATE 0.5 MG/G
OINTMENT TOPICAL ONCE
OUTPATIENT
Start: 2025-02-06 | End: 2025-02-06

## 2025-02-06 RX ORDER — GINSENG 100 MG
CAPSULE ORAL ONCE
OUTPATIENT
Start: 2025-02-06 | End: 2025-02-06

## 2025-02-06 RX ORDER — LIDOCAINE HYDROCHLORIDE 20 MG/ML
JELLY TOPICAL ONCE
OUTPATIENT
Start: 2025-02-06 | End: 2025-02-06

## 2025-02-06 RX ORDER — MUPIROCIN 20 MG/G
OINTMENT TOPICAL ONCE
OUTPATIENT
Start: 2025-02-06 | End: 2025-02-06

## 2025-02-06 RX ORDER — TRIAMCINOLONE ACETONIDE 1 MG/G
OINTMENT TOPICAL ONCE
OUTPATIENT
Start: 2025-02-06 | End: 2025-02-06

## 2025-02-06 RX ORDER — NEOMYCIN/BACITRACIN/POLYMYXINB 3.5-400-5K
OINTMENT (GRAM) TOPICAL ONCE
OUTPATIENT
Start: 2025-02-06 | End: 2025-02-06

## 2025-02-06 RX ADMIN — COLLAGENASE SANTYL: 250 OINTMENT TOPICAL at 11:42

## 2025-02-06 ASSESSMENT — PAIN DESCRIPTION - ORIENTATION: ORIENTATION: RIGHT;LEFT

## 2025-02-06 ASSESSMENT — PAIN DESCRIPTION - DESCRIPTORS: DESCRIPTORS: SHARP

## 2025-02-06 ASSESSMENT — PAIN SCALES - GENERAL: PAINLEVEL_OUTOF10: 7

## 2025-02-06 ASSESSMENT — PAIN DESCRIPTION - FREQUENCY: FREQUENCY: CONTINUOUS

## 2025-02-06 ASSESSMENT — PAIN DESCRIPTION - LOCATION: LOCATION: FOOT

## 2025-02-06 NOTE — WOUND CARE
.Multilayer Compression Wrap   (Not Unna) Below the Knee    NAME:  Yovanny Levine  YOB: 1975  MEDICAL RECORD NUMBER:  [id number]  DATE:  2/6/2025    Multilayer compression wrap: Removed old Multilayer wrap if indicated and wash leg with mild soap/water.  Applied moisturizing agent to dry skin as needed.   Applied primary and secondary dressing as ordered.  Applied multilayered dressing below the knee to right lower leg.  Applied multilayered dressing below the knee to left lower leg.  Instructed patient/caregiver not to remove dressing and to keep it clean and dry.   Instructed patient/caregiver on complications to report to provider, such as pain, numbness in toes, heavy drainage, and slippage of dressing.  Instructed patient on purpose of compression dressing and on activity and exercise recommendations.      Electronically signed by Letha Goodman RN on 2/6/2025 at 11:40 AM

## 2025-02-06 NOTE — WOUND CARE
.Nonselective enzymatic debridement performed with Santyl per physician order to wound(s) of the abdomen  Patient tolerated the procedure well.

## 2025-02-06 NOTE — PLAN OF CARE
Patient Info: Lori Levine  [address] [address]  [phone]  : 1975  Age: 49 y.o.  Gender: male  Todays Date: 2025    Insurance Info:    Plan: DM DUAL ADVANTAGE  Coverage: [facility] MEDICARE  Effective Date: 2023  Group Number: [unfilled]  Subscriber Number: [id number] - (Medicare Managed)    Payer/Plan Subscr  Sex Relation Sub. Ins. ID Effective Group Num   1. [facility] MEDICARE* MICHELINE LEVINE ROSS 1975 Male Self [id number]                                      2. [place]ARE - [place]* KATIEMICHELINE HAWKINS 1975 Male Self [id number]                                    [address] [address]

## 2025-02-06 NOTE — PATIENT INSTRUCTIONS
PHYSICIAN ORDERS AND DISCHARGE INSTRUCTIONS     Wound Care Notes:  Sees Dr Kumar.                Applied for Kerecis grafts on 24  Donated Kerecis applied to left lateral foot 24                             Orders for this week: 2025    Bilateral extremities: Wash with soap and water, pat dry.  Apply bath oil to feet  Pad lateral left foot with ABD for offloading  Wrap BLE with coban 2 full  Leave in place for 1 week     Abdomen: Wash with soap and water, pat dry.  Shave around area   Santyl, Gentamicin, lidocaine, and stimulen powder  Cover with ca alginate and gentac w/ tegaderm  Change Daily       Leave in place til next visit to Wound Care Center    Plan: HBO workup started 9/10/24.   [facility] discharge due to not home bound 24.   Toenails 2025. Called [facility] about abx. No answer 10/28/24       Follow Up Instructions: 1 weeks  Primary Wound Care Provider: Shelly Rubalcava CNP  Call  for any questions or concerns.  Central [phone] for imaging lab work

## 2025-02-06 NOTE — PROGRESS NOTES
Wound Care Center Progress Visit      Yovanny Levine  AGE: 49 y.o.   GENDER: male  : 1975  EPISODE DATE:  2025   Referred by: José Luis Izquierdo MD     Subjective:     CHIEF COMPLAINT  WOUND   Problem List Items Addressed This Visit          Endocrine    Type 2 diabetes mellitus with skin complication, with long-term current use of insulin (HCC)       Other    Absence of toe of left foot (HCC)    Relevant Orders    Initiate Outpatient Wound Care Protocol    Long toenail    Ulcer of abdomen wall, limited to breakdown of skin (HCC) - Primary    Pre-ulcerative calluses     Chief Complaint   Patient presents with    Wound Check        HISTORY of PRESENT ILLNESS      Yovanny Levine is a 49 y.o. male who presents to the Wound Clinic for evaluation and treatment of Chronic diabetic  and  surgical ulcer(s) of the left foot. The condition is of marked severity. The patient has been seeing Dr. Webber at the wound clinic since 24. He was hospitalized -24  Sepsis secondary to left foot cellulitis and chronic left diabetic foot ulcer. General surgery consulted, patient underwent incision and drainage of left foot  and again 2/10/24, IV antibiotics given, home on Augmentin through 3/20/24.  Hospitalized 3/12-3/20/24 with diabetic foot infection, IV antibiotics, switched to oral Cipro. Hospitalized -24 with I&D and foot debridement per Dr. Forrest 24 with wound vac placement. Osteomyelitis, infectious disease consult: plan for vancomycin with dialysis for 6-week cumulative course with end date of 24. The patient has significant underlying medical conditions as below. José Luis Izquierdo MD is PCP.    Wound Pain Timing/Severity: waxing and waning, moderate  Quality of pain: shooting, pins and needles  Severity of pain:  5 / 10   Modifying Factors: diabetes, chronic pressure, shear force, obesity, and non-adherence  Associated Signs/Symptoms: edema and pain    Current wound history: The

## 2025-02-13 ENCOUNTER — HOSPITAL ENCOUNTER (OUTPATIENT)
Dept: WOUND CARE | Age: 50
Discharge: HOME | End: 2025-02-13
Attending: STUDENT IN AN ORGANIZED HEALTH CARE EDUCATION/TRAINING PROGRAM
Payer: MEDICARE

## 2025-02-13 VITALS
RESPIRATION RATE: 18 BRPM | SYSTOLIC BLOOD PRESSURE: 188 MMHG | HEART RATE: 72 BPM | DIASTOLIC BLOOD PRESSURE: 73 MMHG | TEMPERATURE: 98.4 F

## 2025-02-13 DIAGNOSIS — L02.619 ABSCESS OF FOOT: ICD-10-CM

## 2025-02-13 DIAGNOSIS — L97.409 DIABETIC MIDFOOT ULCER IN TYPE 2 DIABETES MELLITUS (HCC): ICD-10-CM

## 2025-02-13 DIAGNOSIS — L03.115 CELLULITIS OF RIGHT FOOT: ICD-10-CM

## 2025-02-13 DIAGNOSIS — L97.412 DIABETIC ULCER OF RIGHT MIDFOOT ASSOCIATED WITH TYPE 2 DIABETES MELLITUS, WITH FAT LAYER EXPOSED (HCC): ICD-10-CM

## 2025-02-13 DIAGNOSIS — E11.621 DIABETIC MIDFOOT ULCER IN TYPE 2 DIABETES MELLITUS (HCC): ICD-10-CM

## 2025-02-13 DIAGNOSIS — E11.621 DIABETIC ULCER OF RIGHT MIDFOOT ASSOCIATED WITH TYPE 2 DIABETES MELLITUS, WITH FAT LAYER EXPOSED (HCC): ICD-10-CM

## 2025-02-13 DIAGNOSIS — Z89.422 ABSENCE OF TOE OF LEFT FOOT (HCC): Primary | ICD-10-CM

## 2025-02-13 DIAGNOSIS — G89.4 CHRONIC PAIN SYNDROME: ICD-10-CM

## 2025-02-13 PROCEDURE — 87077 CULTURE AEROBIC IDENTIFY: CPT

## 2025-02-13 PROCEDURE — 11042 DBRDMT SUBQ TIS 1ST 20SQCM/<: CPT | Performed by: NURSE PRACTITIONER

## 2025-02-13 PROCEDURE — 11042 DBRDMT SUBQ TIS 1ST 20SQCM/<: CPT

## 2025-02-13 PROCEDURE — 6370000000 HC RX 637 (ALT 250 FOR IP): Performed by: SURGERY

## 2025-02-13 PROCEDURE — 99213 OFFICE O/P EST LOW 20 MIN: CPT | Performed by: NURSE PRACTITIONER

## 2025-02-13 PROCEDURE — 87070 CULTURE OTHR SPECIMN AEROBIC: CPT

## 2025-02-13 PROCEDURE — 87186 SC STD MICRODIL/AGAR DIL: CPT

## 2025-02-13 PROCEDURE — 11056 PARNG/CUTG B9 HYPRKR LES 2-4: CPT

## 2025-02-13 PROCEDURE — 29581 APPL MULTLAYER CMPRN SYS LEG: CPT

## 2025-02-13 PROCEDURE — 87205 SMEAR GRAM STAIN: CPT

## 2025-02-13 PROCEDURE — 87075 CULTR BACTERIA EXCEPT BLOOD: CPT

## 2025-02-13 RX ORDER — SODIUM CHLOR/HYPOCHLOROUS ACID 0.033 %
SOLUTION, IRRIGATION IRRIGATION ONCE
OUTPATIENT
Start: 2025-02-13 | End: 2025-02-13

## 2025-02-13 RX ORDER — LIDOCAINE 50 MG/G
OINTMENT TOPICAL ONCE
OUTPATIENT
Start: 2025-02-13 | End: 2025-02-13

## 2025-02-13 RX ORDER — LIDOCAINE 50 MG/G
OINTMENT TOPICAL ONCE
Status: COMPLETED | OUTPATIENT
Start: 2025-02-13 | End: 2025-02-13

## 2025-02-13 RX ORDER — BETAMETHASONE DIPROPIONATE 0.5 MG/G
CREAM TOPICAL ONCE
OUTPATIENT
Start: 2025-02-13 | End: 2025-02-13

## 2025-02-13 RX ORDER — CLOBETASOL PROPIONATE 0.5 MG/G
OINTMENT TOPICAL ONCE
OUTPATIENT
Start: 2025-02-13 | End: 2025-02-13

## 2025-02-13 RX ORDER — SULFAMETHOXAZOLE AND TRIMETHOPRIM 800; 160 MG/1; MG/1
1 TABLET ORAL 2 TIMES DAILY
Qty: 20 TABLET | Refills: 0 | Status: SHIPPED | OUTPATIENT
Start: 2025-02-13 | End: 2025-02-23

## 2025-02-13 RX ORDER — LIDOCAINE HYDROCHLORIDE 20 MG/ML
JELLY TOPICAL ONCE
OUTPATIENT
Start: 2025-02-13 | End: 2025-02-13

## 2025-02-13 RX ORDER — LIDOCAINE HYDROCHLORIDE 40 MG/ML
SOLUTION TOPICAL ONCE
OUTPATIENT
Start: 2025-02-13 | End: 2025-02-13

## 2025-02-13 RX ORDER — GENTAMICIN SULFATE 1 MG/G
OINTMENT TOPICAL ONCE
OUTPATIENT
Start: 2025-02-13 | End: 2025-02-13

## 2025-02-13 RX ORDER — NEOMYCIN/BACITRACIN/POLYMYXINB 3.5-400-5K
OINTMENT (GRAM) TOPICAL ONCE
OUTPATIENT
Start: 2025-02-13 | End: 2025-02-13

## 2025-02-13 RX ORDER — LIDOCAINE 40 MG/G
CREAM TOPICAL ONCE
OUTPATIENT
Start: 2025-02-13 | End: 2025-02-13

## 2025-02-13 RX ORDER — GINSENG 100 MG
CAPSULE ORAL ONCE
OUTPATIENT
Start: 2025-02-13 | End: 2025-02-13

## 2025-02-13 RX ORDER — GENTAMICIN SULFATE 1 MG/G
OINTMENT TOPICAL ONCE
Status: COMPLETED | OUTPATIENT
Start: 2025-02-13 | End: 2025-02-13

## 2025-02-13 RX ORDER — AMOXICILLIN 500 MG/1
500 CAPSULE ORAL 2 TIMES DAILY
Qty: 20 CAPSULE | Refills: 0 | Status: SHIPPED | OUTPATIENT
Start: 2025-02-13 | End: 2025-02-23

## 2025-02-13 RX ORDER — BACITRACIN ZINC AND POLYMYXIN B SULFATE 500; 1000 [USP'U]/G; [USP'U]/G
OINTMENT TOPICAL ONCE
OUTPATIENT
Start: 2025-02-13 | End: 2025-02-13

## 2025-02-13 RX ORDER — OXYCODONE AND ACETAMINOPHEN 7.5; 325 MG/1; MG/1
1 TABLET ORAL EVERY 8 HOURS PRN
Qty: 90 TABLET | Refills: 0 | Status: SHIPPED | OUTPATIENT
Start: 2025-02-13 | End: 2025-03-15

## 2025-02-13 RX ORDER — TRIAMCINOLONE ACETONIDE 1 MG/G
OINTMENT TOPICAL ONCE
OUTPATIENT
Start: 2025-02-13 | End: 2025-02-13

## 2025-02-13 RX ORDER — SILVER SULFADIAZINE 10 MG/G
CREAM TOPICAL ONCE
OUTPATIENT
Start: 2025-02-13 | End: 2025-02-13

## 2025-02-13 RX ORDER — MUPIROCIN 20 MG/G
OINTMENT TOPICAL ONCE
OUTPATIENT
Start: 2025-02-13 | End: 2025-02-13

## 2025-02-13 RX ADMIN — GENTAMICIN SULFATE: 1 OINTMENT TOPICAL at 11:49

## 2025-02-13 RX ADMIN — COLLAGENASE SANTYL: 250 OINTMENT TOPICAL at 11:50

## 2025-02-13 RX ADMIN — LIDOCAINE: 50 OINTMENT TOPICAL at 11:49

## 2025-02-13 ASSESSMENT — PAIN DESCRIPTION - DESCRIPTORS: DESCRIPTORS: SHARP

## 2025-02-13 ASSESSMENT — PAIN DESCRIPTION - LOCATION: LOCATION: FOOT

## 2025-02-13 ASSESSMENT — PAIN DESCRIPTION - ORIENTATION: ORIENTATION: RIGHT;LEFT

## 2025-02-13 ASSESSMENT — PAIN SCALES - GENERAL: PAINLEVEL_OUTOF10: 7

## 2025-02-13 NOTE — PATIENT INSTRUCTIONS
PHYSICIAN ORDERS AND DISCHARGE INSTRUCTIONS     Wound Care Notes:  Sees Dr Kumar.                Applied for Kerecis grafts on 24  Donated Kerecis applied to left lateral foot 24                             Orders for this week: 2025    Right Plantar:   Apply stimulen powder to wound bed   Pack with betadine gauze  Offload with qwick to periwound  Cover with zetuvit   Wrap w/ coban 2 full  Leave in place for 1 week    Bilateral extremities: Wash with soap and water, pat dry.  Apply bath oil to feet  Pad lateral left foot with ABD for offloading  Wrap BLE with coban 2 full  Leave in place for 1 week     Abdomen: Wash with soap and water, pat dry.  Shave around area   Santyl, Gentamicin, lidocaine, and stimulen powder  Cover with ca alginate and gentac w/ tegaderm  Change Daily       Leave in place til next visit to Wound Care Center    Plan: HBO workup started 9/10/24.   [facility] discharge due to not home bound 24.   Toenails 2025. Called [facility] about abx. No answer 10/28/24   Cultures sent from R heel 25     Follow Up Instructions: 1 weeks  Primary Wound Care Provider: Shelly Rubalcava CNP  Call  for any questions or concerns.  Central [phone] for imaging lab work

## 2025-02-13 NOTE — PROGRESS NOTES
canned foods, such as tuna, to remove some sodium. Choose fruits or vegetables instead of salty snack foods.    Edema Management   Whenever resting, raise your legs up. Try to keep the swollen area higher than the level of your heart. Take breaks from standing or sitting in one position. Walk around to increase the blood flow in your lower legs. Move your feet and ankles often while you stand, or tighten and relax your leg muscles. Wear support stockings. Put them on in the morning, before swelling gets worse.  Eat a balanced diet. Lose weight if you need to. Limit the amount of salt (sodium) in your diet. Salt holds fluid in the body and may increase swelling. Apply compression stocking(s) every morning as soon as you get up. Remove at bedtime unless instructed to wear day and night. Hand wash and line dry to prevent loss of elasticity. Replace every 3-4 months to ensure proper fit.     Weight Management   Will need to ultimately change overall eating behaviors to have success with weight loss. Encouraged to weigh daily and work towards a goal of 1-2 pounds of weight loss weekly. Encouraged to journal all food intake, Venafi pal is a useful tool to help keep track of food intake and caloric value.  Keep calorie level at approximately 2838-4994. Protein intake is to be a minimum of 60 grams per day (unless otherwise directed). Water drinking was encouraged with a goal of 64oz-128oz daily. Beverages to be calorie free except for milk. Every other beverage should be water, avoid soda. Continue to increase level of physical activity. Refer to weight management as indicated and requested by patient.          PAST MEDICAL HISTORY        Diagnosis Date    Abscess 2010    scrotal    Acute renal failure (ARF) (HCC) 08/04/2019    Anxiety associated with depression     Anxiety associated with depression     Back pain 07/02/2012    CAD (coronary artery disease) 02/10/2023    Chest pain 05/01/2013    Diabetic nephropathy

## 2025-02-13 NOTE — WOUND CARE
.Multilayer Compression Wrap   (Not Unna) Below the Knee    NAME:  Yovanny Levine  YOB: 1975  MEDICAL RECORD NUMBER:  [id number]  DATE:  2/13/2025    Multilayer compression wrap: Removed old Multilayer wrap if indicated and wash leg with mild soap/water.  Applied moisturizing agent to dry skin as needed.   Applied primary and secondary dressing as ordered.  Applied multilayered dressing below the knee to right lower leg.  Applied multilayered dressing below the knee to left lower leg.  Instructed patient/caregiver not to remove dressing and to keep it clean and dry.   Instructed patient/caregiver on complications to report to provider, such as pain, numbness in toes, heavy drainage, and slippage of dressing.  Instructed patient on purpose of compression dressing and on activity and exercise recommendations.      Electronically signed by Letha Goodman RN on 2/13/2025 at 11:48 AM

## 2025-02-16 LAB
MICROORGANISM SPEC CULT: ABNORMAL
MICROORGANISM/AGENT SPEC: ABNORMAL
SPECIMEN DESCRIPTION: ABNORMAL

## 2025-02-17 ENCOUNTER — HOSPITAL ENCOUNTER (INPATIENT)
Age: 50
LOS: 7 days | Discharge: HOME HEALTH | DRG: 291 | End: 2025-02-24
Attending: STUDENT IN AN ORGANIZED HEALTH CARE EDUCATION/TRAINING PROGRAM | Admitting: INTERNAL MEDICINE
Payer: MEDICARE

## 2025-02-17 ENCOUNTER — APPOINTMENT (OUTPATIENT)
Dept: GENERAL RADIOLOGY | Age: 50
DRG: 291 | End: 2025-02-17
Payer: MEDICARE

## 2025-02-17 DIAGNOSIS — E87.70 HYPERVOLEMIA ASSOCIATED WITH RENAL INSUFFICIENCY: Primary | ICD-10-CM

## 2025-02-17 DIAGNOSIS — E87.5 HYPERKALEMIA: ICD-10-CM

## 2025-02-17 DIAGNOSIS — J96.11 CHRONIC RESPIRATORY FAILURE WITH HYPOXIA (HCC): ICD-10-CM

## 2025-02-17 DIAGNOSIS — N28.9 HYPERVOLEMIA ASSOCIATED WITH RENAL INSUFFICIENCY: Primary | ICD-10-CM

## 2025-02-17 DIAGNOSIS — R07.9 CHEST PAIN, UNSPECIFIED TYPE: ICD-10-CM

## 2025-02-17 PROBLEM — I50.9 HEART FAILURE (HCC): Status: ACTIVE | Noted: 2025-02-17

## 2025-02-17 LAB
ANION GAP SERPL CALCULATED.3IONS-SCNC: 22 MMOL/L (ref 9–17)
B PARAP IS1001 DNA NPH QL NAA+NON-PROBE: NOT DETECTED
B PERT DNA SPEC QL NAA+PROBE: NOT DETECTED
BASOPHILS # BLD: 0.04 K/UL
BASOPHILS NFR BLD: 1 % (ref 0–1)
BNP SERPL-MCNC: ABNORMAL PG/ML (ref 0–125)
BUN SERPL-MCNC: 84 MG/DL (ref 7–20)
C PNEUM DNA NPH QL NAA+NON-PROBE: NOT DETECTED
CALCIUM SERPL-MCNC: 8.4 MG/DL (ref 8.3–10.6)
CHLORIDE SERPL-SCNC: 93 MMOL/L (ref 99–110)
CHP ED QC CHECK: YES
CO2 SERPL-SCNC: 22 MMOL/L (ref 21–32)
CREAT SERPL-MCNC: 9 MG/DL (ref 0.9–1.3)
EOSINOPHIL # BLD: 0.21 K/UL
EOSINOPHILS RELATIVE PERCENT: 3 % (ref 0–3)
ERYTHROCYTE [DISTWIDTH] IN BLOOD BY AUTOMATED COUNT: 16.7 % (ref 11.7–14.9)
FERRITIN SERPL-MCNC: 637 NG/ML (ref 30–400)
FLUAV RNA NPH QL NAA+NON-PROBE: NOT DETECTED
FLUBV RNA NPH QL NAA+NON-PROBE: NOT DETECTED
GFR, ESTIMATED: 6 ML/MIN/1.73M2
GLUCOSE BLD-MCNC: 111 MG/DL
GLUCOSE BLD-MCNC: 111 MG/DL (ref 74–99)
GLUCOSE BLD-MCNC: 152 MG/DL
GLUCOSE BLD-MCNC: 152 MG/DL (ref 74–99)
GLUCOSE BLD-MCNC: 163 MG/DL
GLUCOSE BLD-MCNC: 163 MG/DL (ref 74–99)
GLUCOSE SERPL-MCNC: 169 MG/DL (ref 74–99)
HADV DNA NPH QL NAA+NON-PROBE: NOT DETECTED
HCOV 229E RNA NPH QL NAA+NON-PROBE: NOT DETECTED
HCOV HKU1 RNA NPH QL NAA+NON-PROBE: NOT DETECTED
HCOV NL63 RNA NPH QL NAA+NON-PROBE: NOT DETECTED
HCOV OC43 RNA NPH QL NAA+NON-PROBE: NOT DETECTED
HCT VFR BLD AUTO: 30.4 % (ref 42–52)
HGB BLD-MCNC: 9.7 G/DL (ref 13.5–18)
HMPV RNA NPH QL NAA+NON-PROBE: NOT DETECTED
HPIV1 RNA NPH QL NAA+NON-PROBE: NOT DETECTED
HPIV2 RNA NPH QL NAA+NON-PROBE: NOT DETECTED
HPIV3 RNA NPH QL NAA+NON-PROBE: NOT DETECTED
HPIV4 RNA NPH QL NAA+NON-PROBE: NOT DETECTED
IMM GRANULOCYTES # BLD AUTO: 0.04 K/UL
IMM GRANULOCYTES NFR BLD: 1 %
INFLUENZA A BY PCR: NOT DETECTED
INFLUENZA B BY PCR: NOT DETECTED
IRON SATN MFR SERPL: 18 % (ref 15–50)
IRON SERPL-MCNC: 44 UG/DL (ref 59–158)
LYMPHOCYTES NFR BLD: 0.44 K/UL
LYMPHOCYTES RELATIVE PERCENT: 6 % (ref 24–44)
M PNEUMO DNA NPH QL NAA+NON-PROBE: NOT DETECTED
MCH RBC QN AUTO: 31.1 PG (ref 27–31)
MCHC RBC AUTO-ENTMCNC: 31.9 G/DL (ref 32–36)
MCV RBC AUTO: 97.4 FL (ref 78–100)
MONOCYTES NFR BLD: 0.5 K/UL
MONOCYTES NFR BLD: 7 % (ref 0–4)
NEUTROPHILS NFR BLD: 83 % (ref 36–66)
NEUTS SEG NFR BLD: 5.83 K/UL
PLATELET # BLD AUTO: 164 K/UL (ref 140–440)
PMV BLD AUTO: 9.9 FL (ref 7.5–11.1)
POTASSIUM SERPL-SCNC: 6 MMOL/L (ref 3.5–5.1)
RBC # BLD AUTO: 3.12 M/UL (ref 4.6–6.2)
RSV RNA NPH QL NAA+NON-PROBE: NOT DETECTED
RV+EV RNA NPH QL NAA+NON-PROBE: NOT DETECTED
SARS-COV-2 RDRP RESP QL NAA+PROBE: NOT DETECTED
SARS-COV-2 RNA NPH QL NAA+NON-PROBE: NOT DETECTED
SODIUM SERPL-SCNC: 136 MMOL/L (ref 136–145)
SPECIMEN DESCRIPTION: NORMAL
SPECIMEN DESCRIPTION: NORMAL
TIBC SERPL-MCNC: 248 UG/DL (ref 260–445)
TROPONIN I SERPL HS-MCNC: 145 NG/L (ref 0–22)
TROPONIN I SERPL HS-MCNC: 155 NG/L (ref 0–22)
TROPONIN I SERPL HS-MCNC: 160 NG/L (ref 0–22)
TSH SERPL DL<=0.05 MIU/L-ACNC: 1.64 UIU/ML (ref 0.27–4.2)
UNSATURATED IRON BINDING CAPACITY: 204 UG/DL (ref 110–370)
WBC OTHER # BLD: 7.1 K/UL (ref 4–10.5)

## 2025-02-17 PROCEDURE — 85025 COMPLETE CBC W/AUTO DIFF WBC: CPT

## 2025-02-17 PROCEDURE — 6360000002 HC RX W HCPCS: Performed by: INTERNAL MEDICINE

## 2025-02-17 PROCEDURE — 83880 ASSAY OF NATRIURETIC PEPTIDE: CPT

## 2025-02-17 PROCEDURE — 80048 BASIC METABOLIC PNL TOTAL CA: CPT

## 2025-02-17 PROCEDURE — 84484 ASSAY OF TROPONIN QUANT: CPT

## 2025-02-17 PROCEDURE — 90935 HEMODIALYSIS ONE EVALUATION: CPT

## 2025-02-17 PROCEDURE — 2700000000 HC OXYGEN THERAPY PER DAY

## 2025-02-17 PROCEDURE — 82962 GLUCOSE BLOOD TEST: CPT

## 2025-02-17 PROCEDURE — 87635 SARS-COV-2 COVID-19 AMP PRB: CPT

## 2025-02-17 PROCEDURE — 84443 ASSAY THYROID STIM HORMONE: CPT

## 2025-02-17 PROCEDURE — 83550 IRON BINDING TEST: CPT

## 2025-02-17 PROCEDURE — 2000000000 HC ICU R&B

## 2025-02-17 PROCEDURE — 94640 AIRWAY INHALATION TREATMENT: CPT

## 2025-02-17 PROCEDURE — 99285 EMERGENCY DEPT VISIT HI MDM: CPT

## 2025-02-17 PROCEDURE — 5A1D70Z PERFORMANCE OF URINARY FILTRATION, INTERMITTENT, LESS THAN 6 HOURS PER DAY: ICD-10-PCS | Performed by: INTERNAL MEDICINE

## 2025-02-17 PROCEDURE — 94761 N-INVAS EAR/PLS OXIMETRY MLT: CPT

## 2025-02-17 PROCEDURE — 82728 ASSAY OF FERRITIN: CPT

## 2025-02-17 PROCEDURE — 87502 INFLUENZA DNA AMP PROBE: CPT

## 2025-02-17 PROCEDURE — 0202U NFCT DS 22 TRGT SARS-COV-2: CPT

## 2025-02-17 PROCEDURE — 2500000003 HC RX 250 WO HCPCS: Performed by: INTERNAL MEDICINE

## 2025-02-17 PROCEDURE — 6370000000 HC RX 637 (ALT 250 FOR IP): Performed by: INTERNAL MEDICINE

## 2025-02-17 PROCEDURE — 83540 ASSAY OF IRON: CPT

## 2025-02-17 PROCEDURE — 6360000002 HC RX W HCPCS: Performed by: STUDENT IN AN ORGANIZED HEALTH CARE EDUCATION/TRAINING PROGRAM

## 2025-02-17 PROCEDURE — 71046 X-RAY EXAM CHEST 2 VIEWS: CPT

## 2025-02-17 RX ORDER — CALCIUM CARBONATE 500 MG/1
2 TABLET, CHEWABLE ORAL 2 TIMES DAILY
Status: DISCONTINUED | OUTPATIENT
Start: 2025-02-17 | End: 2025-02-20

## 2025-02-17 RX ORDER — POLYETHYLENE GLYCOL 3350 17 G/17G
17 POWDER, FOR SOLUTION ORAL DAILY PRN
Status: DISCONTINUED | OUTPATIENT
Start: 2025-02-17 | End: 2025-02-24 | Stop reason: HOSPADM

## 2025-02-17 RX ORDER — ROPINIROLE 0.25 MG/1
0.25 TABLET, FILM COATED ORAL NIGHTLY
Status: DISCONTINUED | OUTPATIENT
Start: 2025-02-17 | End: 2025-02-24 | Stop reason: HOSPADM

## 2025-02-17 RX ORDER — HEPARIN SODIUM 1000 [USP'U]/ML
2000 INJECTION, SOLUTION INTRAVENOUS; SUBCUTANEOUS
Status: COMPLETED | OUTPATIENT
Start: 2025-02-17 | End: 2025-02-17

## 2025-02-17 RX ORDER — GLUCAGON 1 MG/ML
1 KIT INJECTION PRN
Status: DISCONTINUED | OUTPATIENT
Start: 2025-02-17 | End: 2025-02-24 | Stop reason: HOSPADM

## 2025-02-17 RX ORDER — ONDANSETRON 2 MG/ML
4 INJECTION INTRAMUSCULAR; INTRAVENOUS EVERY 6 HOURS PRN
Status: DISCONTINUED | OUTPATIENT
Start: 2025-02-17 | End: 2025-02-24 | Stop reason: HOSPADM

## 2025-02-17 RX ORDER — ISOSORBIDE MONONITRATE 30 MG/1
30 TABLET, EXTENDED RELEASE ORAL DAILY
Status: DISCONTINUED | OUTPATIENT
Start: 2025-02-17 | End: 2025-02-18

## 2025-02-17 RX ORDER — DEXTROSE MONOHYDRATE 100 MG/ML
INJECTION, SOLUTION INTRAVENOUS CONTINUOUS PRN
Status: DISCONTINUED | OUTPATIENT
Start: 2025-02-17 | End: 2025-02-24 | Stop reason: HOSPADM

## 2025-02-17 RX ORDER — SODIUM CHLORIDE 9 MG/ML
INJECTION, SOLUTION INTRAVENOUS PRN
Status: DISCONTINUED | OUTPATIENT
Start: 2025-02-17 | End: 2025-02-24 | Stop reason: HOSPADM

## 2025-02-17 RX ORDER — PANTOPRAZOLE SODIUM 40 MG/1
40 TABLET, DELAYED RELEASE ORAL DAILY
Status: DISCONTINUED | OUTPATIENT
Start: 2025-02-17 | End: 2025-02-24 | Stop reason: HOSPADM

## 2025-02-17 RX ORDER — METOPROLOL SUCCINATE 25 MG/1
25 TABLET, EXTENDED RELEASE ORAL DAILY
Status: DISCONTINUED | OUTPATIENT
Start: 2025-02-17 | End: 2025-02-24 | Stop reason: HOSPADM

## 2025-02-17 RX ORDER — AMOXICILLIN 500 MG/1
500 CAPSULE ORAL 2 TIMES DAILY
Status: DISCONTINUED | OUTPATIENT
Start: 2025-02-17 | End: 2025-02-18

## 2025-02-17 RX ORDER — POTASSIUM CHLORIDE 7.45 MG/ML
10 INJECTION INTRAVENOUS PRN
Status: DISCONTINUED | OUTPATIENT
Start: 2025-02-17 | End: 2025-02-24 | Stop reason: HOSPADM

## 2025-02-17 RX ORDER — HYDRALAZINE HYDROCHLORIDE 25 MG/1
25 TABLET, FILM COATED ORAL 2 TIMES DAILY
Status: DISCONTINUED | OUTPATIENT
Start: 2025-02-17 | End: 2025-02-23

## 2025-02-17 RX ORDER — ACETAMINOPHEN 325 MG/1
650 TABLET ORAL EVERY 6 HOURS PRN
Status: DISCONTINUED | OUTPATIENT
Start: 2025-02-17 | End: 2025-02-24 | Stop reason: HOSPADM

## 2025-02-17 RX ORDER — INSULIN LISPRO 100 [IU]/ML
0-4 INJECTION, SOLUTION INTRAVENOUS; SUBCUTANEOUS
Status: DISCONTINUED | OUTPATIENT
Start: 2025-02-17 | End: 2025-02-24 | Stop reason: HOSPADM

## 2025-02-17 RX ORDER — CLOPIDOGREL BISULFATE 75 MG/1
75 TABLET ORAL DAILY
Status: DISCONTINUED | OUTPATIENT
Start: 2025-02-17 | End: 2025-02-24 | Stop reason: HOSPADM

## 2025-02-17 RX ORDER — CALCITRIOL 0.25 UG/1
0.5 CAPSULE, LIQUID FILLED ORAL 2 TIMES DAILY
Status: DISCONTINUED | OUTPATIENT
Start: 2025-02-17 | End: 2025-02-20

## 2025-02-17 RX ORDER — POTASSIUM CHLORIDE 1500 MG/1
40 TABLET, EXTENDED RELEASE ORAL PRN
Status: DISCONTINUED | OUTPATIENT
Start: 2025-02-17 | End: 2025-02-24 | Stop reason: HOSPADM

## 2025-02-17 RX ORDER — ALBUTEROL SULFATE 0.83 MG/ML
10 SOLUTION RESPIRATORY (INHALATION) ONCE
Status: COMPLETED | OUTPATIENT
Start: 2025-02-17 | End: 2025-02-17

## 2025-02-17 RX ORDER — SODIUM CHLORIDE 0.9 % (FLUSH) 0.9 %
5-40 SYRINGE (ML) INJECTION EVERY 12 HOURS SCHEDULED
Status: DISCONTINUED | OUTPATIENT
Start: 2025-02-17 | End: 2025-02-24 | Stop reason: HOSPADM

## 2025-02-17 RX ORDER — OXYCODONE AND ACETAMINOPHEN 7.5; 325 MG/1; MG/1
1 TABLET ORAL EVERY 8 HOURS PRN
Status: DISCONTINUED | OUTPATIENT
Start: 2025-02-17 | End: 2025-02-24 | Stop reason: HOSPADM

## 2025-02-17 RX ORDER — INSULIN GLARGINE 100 [IU]/ML
8 INJECTION, SOLUTION SUBCUTANEOUS NIGHTLY
Status: DISCONTINUED | OUTPATIENT
Start: 2025-02-17 | End: 2025-02-23

## 2025-02-17 RX ORDER — SULFAMETHOXAZOLE AND TRIMETHOPRIM 800; 160 MG/1; MG/1
1 TABLET ORAL 2 TIMES DAILY
Status: DISCONTINUED | OUTPATIENT
Start: 2025-02-17 | End: 2025-02-18

## 2025-02-17 RX ORDER — MAGNESIUM SULFATE IN WATER 40 MG/ML
2000 INJECTION, SOLUTION INTRAVENOUS PRN
Status: DISCONTINUED | OUTPATIENT
Start: 2025-02-17 | End: 2025-02-24 | Stop reason: HOSPADM

## 2025-02-17 RX ORDER — METOPROLOL SUCCINATE 25 MG/1
25 TABLET, EXTENDED RELEASE ORAL DAILY
COMMUNITY

## 2025-02-17 RX ORDER — CALCIUM GLUCONATE 20 MG/ML
1000 INJECTION, SOLUTION INTRAVENOUS ONCE
Status: DISCONTINUED | OUTPATIENT
Start: 2025-02-17 | End: 2025-02-18

## 2025-02-17 RX ORDER — HEPARIN SODIUM 5000 [USP'U]/ML
5000 INJECTION, SOLUTION INTRAVENOUS; SUBCUTANEOUS EVERY 8 HOURS SCHEDULED
Status: DISCONTINUED | OUTPATIENT
Start: 2025-02-17 | End: 2025-02-24 | Stop reason: HOSPADM

## 2025-02-17 RX ORDER — SODIUM CHLORIDE 0.9 % (FLUSH) 0.9 %
5-40 SYRINGE (ML) INJECTION PRN
Status: DISCONTINUED | OUTPATIENT
Start: 2025-02-17 | End: 2025-02-24 | Stop reason: HOSPADM

## 2025-02-17 RX ORDER — ONDANSETRON 4 MG/1
4 TABLET, ORALLY DISINTEGRATING ORAL EVERY 8 HOURS PRN
Status: DISCONTINUED | OUTPATIENT
Start: 2025-02-17 | End: 2025-02-24 | Stop reason: HOSPADM

## 2025-02-17 RX ORDER — ATORVASTATIN CALCIUM 40 MG/1
40 TABLET, FILM COATED ORAL NIGHTLY
Status: DISCONTINUED | OUTPATIENT
Start: 2025-02-17 | End: 2025-02-24 | Stop reason: HOSPADM

## 2025-02-17 RX ORDER — ASPIRIN 81 MG/1
81 TABLET, CHEWABLE ORAL DAILY
Status: DISCONTINUED | OUTPATIENT
Start: 2025-02-17 | End: 2025-02-24 | Stop reason: HOSPADM

## 2025-02-17 RX ORDER — ENOXAPARIN SODIUM 100 MG/ML
40 INJECTION SUBCUTANEOUS DAILY
Status: DISCONTINUED | OUTPATIENT
Start: 2025-02-17 | End: 2025-02-17

## 2025-02-17 RX ORDER — ALBUTEROL SULFATE 90 UG/1
2 INHALANT RESPIRATORY (INHALATION) 4 TIMES DAILY PRN
Status: DISCONTINUED | OUTPATIENT
Start: 2025-02-17 | End: 2025-02-24 | Stop reason: HOSPADM

## 2025-02-17 RX ADMIN — METOPROLOL SUCCINATE 25 MG: 25 TABLET, EXTENDED RELEASE ORAL at 19:14

## 2025-02-17 RX ADMIN — HEPARIN SODIUM 2000 UNITS: 1000 INJECTION INTRAVENOUS; SUBCUTANEOUS at 14:30

## 2025-02-17 RX ADMIN — ASPIRIN 81 MG: 81 TABLET, CHEWABLE ORAL at 19:14

## 2025-02-17 RX ADMIN — SODIUM CHLORIDE, PRESERVATIVE FREE 10 ML: 5 INJECTION INTRAVENOUS at 21:28

## 2025-02-17 RX ADMIN — ROPINIROLE HYDROCHLORIDE 0.25 MG: 0.25 TABLET, FILM COATED ORAL at 21:27

## 2025-02-17 RX ADMIN — INSULIN GLARGINE 8 UNITS: 100 INJECTION, SOLUTION SUBCUTANEOUS at 21:27

## 2025-02-17 RX ADMIN — CLOPIDOGREL BISULFATE 75 MG: 75 TABLET ORAL at 19:14

## 2025-02-17 RX ADMIN — ATORVASTATIN CALCIUM 40 MG: 40 TABLET, FILM COATED ORAL at 21:27

## 2025-02-17 RX ADMIN — HEPARIN SODIUM 5000 UNITS: 5000 INJECTION INTRAVENOUS; SUBCUTANEOUS at 21:27

## 2025-02-17 RX ADMIN — AMOXICILLIN 500 MG: 500 CAPSULE ORAL at 21:27

## 2025-02-17 RX ADMIN — CALCIUM CARBONATE 1000 MG: 500 TABLET, CHEWABLE ORAL at 21:27

## 2025-02-17 RX ADMIN — EMPAGLIFLOZIN 10 MG: 10 TABLET, FILM COATED ORAL at 19:14

## 2025-02-17 RX ADMIN — PANTOPRAZOLE SODIUM 40 MG: 40 TABLET, DELAYED RELEASE ORAL at 19:14

## 2025-02-17 RX ADMIN — ALBUTEROL SULFATE 10 MG: 2.5 SOLUTION RESPIRATORY (INHALATION) at 13:28

## 2025-02-17 RX ADMIN — ISOSORBIDE MONONITRATE 30 MG: 30 TABLET, EXTENDED RELEASE ORAL at 19:14

## 2025-02-17 RX ADMIN — CALCITRIOL 0.5 MCG: 0.5 CAPSULE, LIQUID FILLED ORAL at 21:27

## 2025-02-17 RX ADMIN — HYDRALAZINE HYDROCHLORIDE 25 MG: 25 TABLET ORAL at 21:27

## 2025-02-17 RX ADMIN — OXYCODONE HYDROCHLORIDE AND ACETAMINOPHEN 1 TABLET: 7.5; 325 TABLET ORAL at 19:14

## 2025-02-17 RX ADMIN — SULFAMETHOXAZOLE AND TRIMETHOPRIM 1 TABLET: 800; 160 TABLET ORAL at 21:27

## 2025-02-17 ASSESSMENT — PAIN SCALES - GENERAL
PAINLEVEL_OUTOF10: 8
PAINLEVEL_OUTOF10: 6

## 2025-02-17 ASSESSMENT — PAIN - FUNCTIONAL ASSESSMENT
PAIN_FUNCTIONAL_ASSESSMENT: 0-10
PAIN_FUNCTIONAL_ASSESSMENT: 0-10

## 2025-02-17 ASSESSMENT — PAIN DESCRIPTION - ORIENTATION: ORIENTATION: LEFT;RIGHT

## 2025-02-17 ASSESSMENT — PAIN DESCRIPTION - DESCRIPTORS: DESCRIPTORS: ACHING

## 2025-02-17 ASSESSMENT — PAIN DESCRIPTION - LOCATION: LOCATION: FOOT;LEG

## 2025-02-17 NOTE — H&P
Social Determinants of Health     Food Insecurity: No Food Insecurity (1/20/2025)    Hunger Vital Sign    • Worried About Running Out of Food in the Last Year: Never true    • Ran Out of Food in the Last Year: Never true   Transportation Needs: Unmet Transportation Needs (1/20/2025)    PRAPARE - Transportation    • Lack of Transportation (Medical): Yes    • Lack of Transportation (Non-Medical): Yes   Housing Stability: Low Risk  (1/20/2025)    Housing Stability Vital Sign    • Unable to Pay for Housing in the Last Year: No    • Number of Times Moved in the Last Year: 1    • Homeless in the Last Year: No   Recent Concern: Housing Stability - High Risk (12/14/2024)    Housing Stability Vital Sign    • Unable to Pay for Housing in the Last Year: No    • Number of Times Moved in the Last Year: 2    • Homeless in the Last Year: No       Medications:   Medications:   • calcium gluconate  1,000 mg IntraVENous Once   • insulin regular  10 Units IntraVENous Once    And   • dextrose bolus  250 mL IntraVENous Once   • heparin (porcine)  2,000 Units IntraVENous Once in dialysis      Infusions:   • dextrose       PRN Meds: glucose, 4 tablet, PRN  dextrose bolus, 125 mL, PRN   Or  dextrose bolus, 250 mL, PRN  glucagon (rDNA), 1 mg, PRN  dextrose, , Continuous PRN        Labs      CBC:   Recent Labs     02/17/25  1143   WBC 7.1   HGB 9.7*        BMP:    Recent Labs     02/17/25  1143 02/17/25  1301     --    K 6.0*  --    CL 93*  --    CO2 22  --    BUN 84*  --    CREATININE 9.0*  --    GLUCOSE 169* 152     Hepatic: No results for input(s): \"AST\", \"ALT\", \"BILITOT\", \"ALKPHOS\" in the last 72 hours.    Invalid input(s): \"ALB\"  Lipids:   Lab Results   Component Value Date/Time    CHOL 166 09/22/2022 02:02 PM    CHOL 168 10/15/2013 10:30 AM    HDL 30 09/22/2022 02:02 PM    TRIG 254 09/22/2022 02:02 PM     Hemoglobin A1C:   Lab Results   Component Value Date/Time    LABA1C 5.7 08/30/2024 02:57 AM     TSH: No results

## 2025-02-17 NOTE — ED PROVIDER NOTES
CC: Prenatal visit    Vianca Jules is a 23 y.o.  at 19w2d.  Doing well.  Denies dysuria, abnormal vaginal d/c, headaches, heartburn, constipation, regular contractions, LOF, or VB.  Reports good FM.    /70   Wt 73.9 kg (163 lb)   LMP 2021   BMI 26.31 kg/m²   SVE: NA  Preliminary anatomy report reviewed. Subopts noted. All anatomy seen noted to appear normal at this time. Posterior placenta w/o previa, with normal insertion of a 3VC. CL- 3.32cm.      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  at 19w2d.  - RTC in 4 weeks  - Reviewed COVID-19 visitation policy  - Reviewed COVID-19 precautions     Diagnosis Plan   1. Heartburn during pregnancy in second trimester     2. 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 Rh negative status during pregnancy in second trimester     4. Marijuana abuse     5. High risk pregnancy due to maternal drug abuse in second trimester (HCC)     6. Primigravida in second trimester     7. 19 weeks gestation of pregnancy     8. Encounter for other  screening follow-up  US ob follow up transabdominal approach     SEJAL Mahmood  2021  13:01 CST    
    TOE AMPUTATION      right great toe    TOE AMPUTATION Right 2019    TOE AMPUTATION RIGHT 5TH TOE performed by Jose Caba DPM at [facility] OR    TOE AMPUTATION Right 2019    TOE AMPUTATION RIGHT 4TH TOE performed by Jose Caba DPM at [facility] OR    TOE AMPUTATION Left 2023    GREAT TOE TRANSMETATARSAL AMPUTATION performed by Sammy Hsieh MD at [facility] OR    TOE AMPUTATION Left 2024    left 5th TOE AMPUTATION performed by Claudy Forrest DO at [facility] OR    TONSILLECTOMY AND ADENOIDECTOMY  1988    UPPER GASTROINTESTINAL ENDOSCOPY  2011    Mild gastritis with moderatley severe antritis, small hiatal hernia, Dr. Medina    UPPER GASTROINTESTINAL ENDOSCOPY N/A 2019    EGD BIOPSY performed by Jose Maria Cornelius MD at [facility] ENDOSCOPY    UPPER GASTROINTESTINAL ENDOSCOPY N/A 2022    EGD DIAGNOSTIC ONLY performed by Jose Maria Cornelius MD at [facility] ENDOSCOPY    UPPER GASTROINTESTINAL ENDOSCOPY N/A 2023    EGD BIOPSY performed by Mia AMBRIZ MD at [facility] ENDOSCOPY     Family History   Problem Relation Age of Onset    Cancer Mother         ?site    Stroke Mother     Bleeding Prob Mother     Diabetes Father     Heart Disease Father     High Blood Pressure Father     Obesity Father     Kidney Disease Father     Diabetes Sister     High Blood Pressure Sister     Mental Illness Sister     Obesity Sister     High Blood Pressure Other     Mental Illness Other         bipolar    Other Son         cyst in ear canal    ADHD Daughter      Social History     Socioeconomic History    Marital status: Legally      Spouse name: Not on file    Number of children: Not on file    Years of education: Not on file    Highest education level: Not on file   Occupational History    Not on file   Tobacco Use    Smoking status: Former     Current packs/day: 0.00     Types: Cigarettes     Start date: 2002     Quit date: 2022     Years since quittin.6     Passive exposure: Never    Smokeless

## 2025-02-17 NOTE — ED NOTES
Patient transported to Dialysis by transport. Belongings taken with patient in case he comes back to a different location.

## 2025-02-17 NOTE — ED NOTES
Medication History  [facility]    Patient Name: Yovanny Levine 1975     Medication history has been completed by: Natali Reyes CPhT    Source(s) of information: patient and retail pharmacy     Primary Care Physician: José Luis Izquierdo MD     Pharmacy: [facility]    Allergies as of 02/17/2025 - Fully Reviewed 02/17/2025   Allergen Reaction Noted    Adhesive tape Rash 10/15/2013    Doxycycline Nausea And Vomiting 07/09/2018    Reglan [metoclopramide] Anxiety 10/07/2018        Prior to Admission medications    Medication Sig Start Date End Date Taking? Authorizing Provider   metoprolol succinate (TOPROL XL) 25 MG extended release tablet Take 1 tablet by mouth daily   Yes Provider, MD Deejay   sulfamethoxazole-trimethoprim (BACTRIM DS;SEPTRA DS) 800-160 MG per tablet Take 1 tablet by mouth 2 times daily for 10 days 2/13/25 2/23/25 Yes Shelly Rubalcava APRN - CNP   amoxicillin (AMOXIL) 500 MG capsule Take 1 capsule by mouth 2 times daily for 10 days 2/13/25 2/23/25 Yes Shelly Rubalcava APRN - CNP   oxyCODONE-acetaminophen (PERCOCET) 7.5-325 MG per tablet Take 1 tablet by mouth every 8 hours as needed for Pain for up to 30 days. Intended supply: 30 days Max Daily Amount: 3 tablets 2/13/25 3/15/25 Yes Shelly Rubalcava APRN - CNP   empagliflozin (JARDIANCE) 10 MG tablet Take 1 tablet by mouth daily 1/28/25  Yes Sandhya Calderon PA-C   hydrALAZINE (APRESOLINE) 50 MG tablet Take 0.5 tablets by mouth in the morning and at bedtime 1/27/25  Yes Sandhya Calderon PA-C   rOPINIRole (REQUIP) 0.25 MG tablet Take 1 tablet by mouth nightly 1/27/25  Yes Sandhya Calderon PA-C   pantoprazole (PROTONIX) 40 MG tablet Take 1 tablet by mouth daily 1/28/25  Yes Sandhya Calderon PA-C   isosorbide mononitrate (IMDUR) 30 MG extended release tablet Take 1 tablet by mouth daily 1/27/25  Yes Sandhya Calderon PA-C   aspirin 81 MG chewable tablet Take 1 tablet by mouth daily 12/20/24  Yes Broderick Schafer MD   atorvastatin

## 2025-02-18 ENCOUNTER — APPOINTMENT (OUTPATIENT)
Dept: GENERAL RADIOLOGY | Age: 50
DRG: 291 | End: 2025-02-18
Payer: MEDICARE

## 2025-02-18 LAB
ALBUMIN SERPL-MCNC: 3.6 G/DL (ref 3.4–5)
ALBUMIN/GLOB SERPL: 1.1 {RATIO} (ref 1.1–2.2)
ALP SERPL-CCNC: 73 U/L (ref 40–129)
ALT SERPL-CCNC: 11 U/L (ref 10–40)
ANION GAP SERPL CALCULATED.3IONS-SCNC: 15 MMOL/L (ref 9–17)
AST SERPL-CCNC: 17 U/L (ref 15–37)
BILIRUB DIRECT SERPL-MCNC: 0.3 MG/DL (ref 0–0.3)
BILIRUB INDIRECT SERPL-MCNC: 0.3 MG/DL (ref 0–0.7)
BILIRUB SERPL-MCNC: 0.6 MG/DL (ref 0–1)
BUN SERPL-MCNC: 56 MG/DL (ref 7–20)
CALCIUM SERPL-MCNC: 8.6 MG/DL (ref 8.3–10.6)
CHLORIDE SERPL-SCNC: 97 MMOL/L (ref 99–110)
CHOLEST SERPL-MCNC: 84 MG/DL (ref 125–199)
CO2 SERPL-SCNC: 27 MMOL/L (ref 21–32)
CREAT SERPL-MCNC: 7.3 MG/DL (ref 0.9–1.3)
CRP SERPL HS-MCNC: 20.3 MG/L (ref 0–5)
GFR, ESTIMATED: 8 ML/MIN/1.73M2
GLUCOSE BLD-MCNC: 114 MG/DL (ref 74–99)
GLUCOSE BLD-MCNC: 115 MG/DL (ref 74–99)
GLUCOSE BLD-MCNC: 126 MG/DL (ref 74–99)
GLUCOSE BLD-MCNC: 131 MG/DL (ref 74–99)
GLUCOSE SERPL-MCNC: 142 MG/DL (ref 74–99)
HDLC SERPL-MCNC: 30 MG/DL
LDLC SERPL CALC-MCNC: 43 MG/DL
MAGNESIUM SERPL-MCNC: 2.2 MG/DL (ref 1.8–2.4)
MICROORGANISM SPEC CULT: ABNORMAL
MICROORGANISM/AGENT SPEC: ABNORMAL
POTASSIUM SERPL-SCNC: 5.1 MMOL/L (ref 3.5–5.1)
PROT SERPL-MCNC: 6.8 G/DL (ref 6.4–8.2)
SODIUM SERPL-SCNC: 139 MMOL/L (ref 136–145)
SPECIMEN DESCRIPTION: ABNORMAL
TRIGL SERPL-MCNC: 57 MG/DL
TROPONIN I SERPL HS-MCNC: 140 NG/L (ref 0–22)

## 2025-02-18 PROCEDURE — 2700000000 HC OXYGEN THERAPY PER DAY

## 2025-02-18 PROCEDURE — 6360000002 HC RX W HCPCS: Performed by: INTERNAL MEDICINE

## 2025-02-18 PROCEDURE — 82962 GLUCOSE BLOOD TEST: CPT

## 2025-02-18 PROCEDURE — 86140 C-REACTIVE PROTEIN: CPT

## 2025-02-18 PROCEDURE — 2580000003 HC RX 258: Performed by: NURSE PRACTITIONER

## 2025-02-18 PROCEDURE — 83735 ASSAY OF MAGNESIUM: CPT

## 2025-02-18 PROCEDURE — APPSS60 APP SPLIT SHARED TIME 46-60 MINUTES: Performed by: NURSE PRACTITIONER

## 2025-02-18 PROCEDURE — 80053 COMPREHEN METABOLIC PANEL: CPT

## 2025-02-18 PROCEDURE — 99223 1ST HOSP IP/OBS HIGH 75: CPT | Performed by: INTERNAL MEDICINE

## 2025-02-18 PROCEDURE — 1200000000 HC SEMI PRIVATE

## 2025-02-18 PROCEDURE — 73630 X-RAY EXAM OF FOOT: CPT

## 2025-02-18 PROCEDURE — 80061 LIPID PANEL: CPT

## 2025-02-18 PROCEDURE — 6370000000 HC RX 637 (ALT 250 FOR IP): Performed by: INTERNAL MEDICINE

## 2025-02-18 PROCEDURE — 82248 BILIRUBIN DIRECT: CPT

## 2025-02-18 PROCEDURE — 2500000003 HC RX 250 WO HCPCS: Performed by: INTERNAL MEDICINE

## 2025-02-18 PROCEDURE — 6360000002 HC RX W HCPCS: Performed by: NURSE PRACTITIONER

## 2025-02-18 PROCEDURE — 84484 ASSAY OF TROPONIN QUANT: CPT

## 2025-02-18 PROCEDURE — 94761 N-INVAS EAR/PLS OXIMETRY MLT: CPT

## 2025-02-18 RX ORDER — DIPHENHYDRAMINE HYDROCHLORIDE 50 MG/ML
50 INJECTION INTRAMUSCULAR; INTRAVENOUS EVERY 6 HOURS PRN
Status: DISCONTINUED | OUTPATIENT
Start: 2025-02-18 | End: 2025-02-24 | Stop reason: HOSPADM

## 2025-02-18 RX ORDER — ISOSORBIDE MONONITRATE 60 MG/1
60 TABLET, EXTENDED RELEASE ORAL DAILY
Status: DISCONTINUED | OUTPATIENT
Start: 2025-02-19 | End: 2025-02-24 | Stop reason: HOSPADM

## 2025-02-18 RX ORDER — LABETALOL HYDROCHLORIDE 5 MG/ML
10 INJECTION, SOLUTION INTRAVENOUS EVERY 4 HOURS PRN
Status: DISCONTINUED | OUTPATIENT
Start: 2025-02-18 | End: 2025-02-24 | Stop reason: HOSPADM

## 2025-02-18 RX ORDER — HYDRALAZINE HYDROCHLORIDE 20 MG/ML
10 INJECTION INTRAMUSCULAR; INTRAVENOUS EVERY 4 HOURS PRN
Status: DISCONTINUED | OUTPATIENT
Start: 2025-02-18 | End: 2025-02-24 | Stop reason: HOSPADM

## 2025-02-18 RX ADMIN — EMPAGLIFLOZIN 10 MG: 10 TABLET, FILM COATED ORAL at 08:36

## 2025-02-18 RX ADMIN — HEPARIN SODIUM 5000 UNITS: 5000 INJECTION INTRAVENOUS; SUBCUTANEOUS at 22:30

## 2025-02-18 RX ADMIN — SULFAMETHOXAZOLE AND TRIMETHOPRIM 1 TABLET: 800; 160 TABLET ORAL at 08:42

## 2025-02-18 RX ADMIN — METOPROLOL SUCCINATE 25 MG: 25 TABLET, EXTENDED RELEASE ORAL at 08:36

## 2025-02-18 RX ADMIN — OXYCODONE HYDROCHLORIDE AND ACETAMINOPHEN 1 TABLET: 7.5; 325 TABLET ORAL at 22:16

## 2025-02-18 RX ADMIN — HYDRALAZINE HYDROCHLORIDE 25 MG: 25 TABLET ORAL at 22:31

## 2025-02-18 RX ADMIN — CALCIUM CARBONATE 1000 MG: 500 TABLET, CHEWABLE ORAL at 22:30

## 2025-02-18 RX ADMIN — VANCOMYCIN HYDROCHLORIDE 2000 MG: 1 INJECTION, POWDER, LYOPHILIZED, FOR SOLUTION INTRAVENOUS at 15:51

## 2025-02-18 RX ADMIN — HEPARIN SODIUM 5000 UNITS: 5000 INJECTION INTRAVENOUS; SUBCUTANEOUS at 04:43

## 2025-02-18 RX ADMIN — ROPINIROLE HYDROCHLORIDE 0.25 MG: 0.25 TABLET, FILM COATED ORAL at 22:30

## 2025-02-18 RX ADMIN — CALCITRIOL 0.5 MCG: 0.5 CAPSULE, LIQUID FILLED ORAL at 22:31

## 2025-02-18 RX ADMIN — SODIUM CHLORIDE, PRESERVATIVE FREE 10 ML: 5 INJECTION INTRAVENOUS at 22:30

## 2025-02-18 RX ADMIN — ASPIRIN 81 MG: 81 TABLET, CHEWABLE ORAL at 08:36

## 2025-02-18 RX ADMIN — INSULIN GLARGINE 8 UNITS: 100 INJECTION, SOLUTION SUBCUTANEOUS at 22:30

## 2025-02-18 RX ADMIN — ATORVASTATIN CALCIUM 40 MG: 40 TABLET, FILM COATED ORAL at 22:31

## 2025-02-18 RX ADMIN — CALCITRIOL 0.5 MCG: 0.5 CAPSULE, LIQUID FILLED ORAL at 08:36

## 2025-02-18 RX ADMIN — HYDRALAZINE HYDROCHLORIDE 25 MG: 25 TABLET ORAL at 08:36

## 2025-02-18 RX ADMIN — PANTOPRAZOLE SODIUM 40 MG: 40 TABLET, DELAYED RELEASE ORAL at 08:36

## 2025-02-18 RX ADMIN — HEPARIN SODIUM 5000 UNITS: 5000 INJECTION INTRAVENOUS; SUBCUTANEOUS at 14:18

## 2025-02-18 RX ADMIN — ISOSORBIDE MONONITRATE 30 MG: 30 TABLET, EXTENDED RELEASE ORAL at 08:36

## 2025-02-18 RX ADMIN — AMOXICILLIN 500 MG: 500 CAPSULE ORAL at 08:42

## 2025-02-18 RX ADMIN — OXYCODONE HYDROCHLORIDE AND ACETAMINOPHEN 1 TABLET: 7.5; 325 TABLET ORAL at 14:18

## 2025-02-18 RX ADMIN — DIPHENHYDRAMINE HYDROCHLORIDE 50 MG: 50 INJECTION, SOLUTION INTRAMUSCULAR; INTRAVENOUS at 22:16

## 2025-02-18 RX ADMIN — OXYCODONE HYDROCHLORIDE AND ACETAMINOPHEN 1 TABLET: 7.5; 325 TABLET ORAL at 04:43

## 2025-02-18 RX ADMIN — CALCIUM CARBONATE 1000 MG: 500 TABLET, CHEWABLE ORAL at 08:35

## 2025-02-18 RX ADMIN — DIPHENHYDRAMINE HYDROCHLORIDE 50 MG: 50 INJECTION, SOLUTION INTRAMUSCULAR; INTRAVENOUS at 16:12

## 2025-02-18 RX ADMIN — CLOPIDOGREL BISULFATE 75 MG: 75 TABLET ORAL at 08:36

## 2025-02-18 ASSESSMENT — PAIN DESCRIPTION - LOCATION
LOCATION: FOOT;LEG
LOCATION: FOOT

## 2025-02-18 ASSESSMENT — PAIN DESCRIPTION - DESCRIPTORS
DESCRIPTORS: ACHING
DESCRIPTORS: ACHING;DISCOMFORT

## 2025-02-18 ASSESSMENT — PAIN DESCRIPTION - ORIENTATION
ORIENTATION: RIGHT;LEFT
ORIENTATION: RIGHT;LEFT
ORIENTATION: LEFT;RIGHT
ORIENTATION: RIGHT;LEFT

## 2025-02-18 ASSESSMENT — PAIN SCALES - GENERAL
PAINLEVEL_OUTOF10: 7
PAINLEVEL_OUTOF10: 6
PAINLEVEL_OUTOF10: 7
PAINLEVEL_OUTOF10: 6
PAINLEVEL_OUTOF10: 7
PAINLEVEL_OUTOF10: 7

## 2025-02-18 ASSESSMENT — PAIN - FUNCTIONAL ASSESSMENT: PAIN_FUNCTIONAL_ASSESSMENT: PREVENTS OR INTERFERES SOME ACTIVE ACTIVITIES AND ADLS

## 2025-02-18 NOTE — ED NOTES
ED TO INPATIENT SBAR HANDOFF    Patient Name: Bart Levine   :  1975  49 y.o.   Preferred Name  bart  Family/Caregiver Present no   Restraints no   C-SSRS: Risk of Suicide: No Risk  Sitter no   Sepsis Risk Score        Situation  Chief Complaint   Patient presents with    Shortness of Breath     Left arm numbness onset 4am     Brief Description of Patient's Condition: 49 y.o. male that presents with chest pain and shortness of breath.  States that he was going to get dialysis today but was having chest pain to the morning so he was instructed come to the emergency department.  He reports increasing shortness of breath and orthopnea over the last few days.  No fevers.  No cough, no sick contacts.  He has a history of CHF and ESRD.  He gets  dialysis, did not miss any days last week.  He reported that this morning he began having chest heaviness on the left side of his chest.  He had similar chest heaviness last week as well when his shortness of breath started.  It resolved until this morning.  He describes a tingling sensation in his left hand that is occurring intermittently.   Mental Status: oriented, alert, coherent, logical, thought processes intact, and able to concentrate and follow conversation  Arrived from: home    Imaging:   XR CHEST (2 VW)   Final Result        Abnormal labs:   Abnormal Labs Reviewed   BASIC METABOLIC PANEL - Abnormal; Notable for the following components:       Result Value    Potassium 6.0 (*)     Chloride 93 (*)     Anion Gap 22 (*)     Glucose 169 (*)     BUN 84 (*)     Creatinine 9.0 (*)     Est, Glom Filt Rate 6 (*)     All other components within normal limits   CBC WITH AUTO DIFFERENTIAL - Abnormal; Notable for the following components:    RBC 3.12 (*)     Hemoglobin 9.7 (*)     Hematocrit 30.4 (*)     MCH 31.1 (*)     MCHC 31.9 (*)     RDW 16.7 (*)     Neutrophils % 83 (*)     Lymphocytes % 6 (*)     Monocytes % 7 (*)     Immature Granulocytes % 1

## 2025-02-19 LAB
ANION GAP SERPL CALCULATED.3IONS-SCNC: 14 MMOL/L (ref 9–17)
BNP SERPL-MCNC: ABNORMAL PG/ML (ref 0–125)
BUN SERPL-MCNC: 31 MG/DL (ref 7–20)
CALCIUM SERPL-MCNC: 11 MG/DL (ref 8.3–10.6)
CHLORIDE SERPL-SCNC: 96 MMOL/L (ref 99–110)
CO2 SERPL-SCNC: 27 MMOL/L (ref 21–32)
CREAT SERPL-MCNC: 4.3 MG/DL (ref 0.9–1.3)
CRP SERPL HS-MCNC: 27.8 MG/L (ref 0–5)
GFR, ESTIMATED: 15 ML/MIN/1.73M2
GLUCOSE BLD-MCNC: 110 MG/DL (ref 74–99)
GLUCOSE BLD-MCNC: 147 MG/DL (ref 74–99)
GLUCOSE BLD-MCNC: 206 MG/DL (ref 74–99)
GLUCOSE SERPL-MCNC: 84 MG/DL (ref 74–99)
MAGNESIUM SERPL-MCNC: 2.1 MG/DL (ref 1.8–2.4)
POTASSIUM SERPL-SCNC: 3.9 MMOL/L (ref 3.5–5.1)
SODIUM SERPL-SCNC: 137 MMOL/L (ref 136–145)

## 2025-02-19 PROCEDURE — 80048 BASIC METABOLIC PNL TOTAL CA: CPT

## 2025-02-19 PROCEDURE — 6360000002 HC RX W HCPCS: Performed by: INTERNAL MEDICINE

## 2025-02-19 PROCEDURE — 83880 ASSAY OF NATRIURETIC PEPTIDE: CPT

## 2025-02-19 PROCEDURE — 6370000000 HC RX 637 (ALT 250 FOR IP): Performed by: INTERNAL MEDICINE

## 2025-02-19 PROCEDURE — 2700000000 HC OXYGEN THERAPY PER DAY

## 2025-02-19 PROCEDURE — 2500000003 HC RX 250 WO HCPCS: Performed by: INTERNAL MEDICINE

## 2025-02-19 PROCEDURE — 82962 GLUCOSE BLOOD TEST: CPT

## 2025-02-19 PROCEDURE — 1200000000 HC SEMI PRIVATE

## 2025-02-19 PROCEDURE — APPNB15 APP NON BILLABLE TIME 0-15 MINS

## 2025-02-19 PROCEDURE — 86140 C-REACTIVE PROTEIN: CPT

## 2025-02-19 PROCEDURE — 90935 HEMODIALYSIS ONE EVALUATION: CPT

## 2025-02-19 PROCEDURE — 94761 N-INVAS EAR/PLS OXIMETRY MLT: CPT

## 2025-02-19 PROCEDURE — G0545 PR INHERENT VISIT TO INPT: HCPCS | Performed by: NURSE PRACTITIONER

## 2025-02-19 PROCEDURE — 99232 SBSQ HOSP IP/OBS MODERATE 35: CPT | Performed by: NURSE PRACTITIONER

## 2025-02-19 PROCEDURE — 83735 ASSAY OF MAGNESIUM: CPT

## 2025-02-19 RX ORDER — HEPARIN SODIUM 1000 [USP'U]/ML
2500 INJECTION, SOLUTION INTRAVENOUS; SUBCUTANEOUS
Status: COMPLETED | OUTPATIENT
Start: 2025-02-19 | End: 2025-02-19

## 2025-02-19 RX ADMIN — INSULIN GLARGINE 8 UNITS: 100 INJECTION, SOLUTION SUBCUTANEOUS at 21:34

## 2025-02-19 RX ADMIN — CALCITRIOL 0.5 MCG: 0.5 CAPSULE, LIQUID FILLED ORAL at 21:14

## 2025-02-19 RX ADMIN — DIPHENHYDRAMINE HYDROCHLORIDE 50 MG: 50 INJECTION, SOLUTION INTRAMUSCULAR; INTRAVENOUS at 07:14

## 2025-02-19 RX ADMIN — INSULIN LISPRO 1 UNITS: 100 INJECTION, SOLUTION INTRAVENOUS; SUBCUTANEOUS at 18:40

## 2025-02-19 RX ADMIN — ISOSORBIDE MONONITRATE 60 MG: 60 TABLET, EXTENDED RELEASE ORAL at 13:23

## 2025-02-19 RX ADMIN — HEPARIN SODIUM 2500 UNITS: 1000 INJECTION INTRAVENOUS; SUBCUTANEOUS at 08:21

## 2025-02-19 RX ADMIN — METOPROLOL SUCCINATE 25 MG: 25 TABLET, EXTENDED RELEASE ORAL at 13:23

## 2025-02-19 RX ADMIN — HYDRALAZINE HYDROCHLORIDE 25 MG: 25 TABLET ORAL at 13:22

## 2025-02-19 RX ADMIN — HYDRALAZINE HYDROCHLORIDE 25 MG: 25 TABLET ORAL at 21:13

## 2025-02-19 RX ADMIN — ROPINIROLE HYDROCHLORIDE 0.25 MG: 0.25 TABLET, FILM COATED ORAL at 21:13

## 2025-02-19 RX ADMIN — HEPARIN SODIUM 5000 UNITS: 5000 INJECTION INTRAVENOUS; SUBCUTANEOUS at 06:42

## 2025-02-19 RX ADMIN — CALCIUM CARBONATE 1000 MG: 500 TABLET, CHEWABLE ORAL at 21:13

## 2025-02-19 RX ADMIN — EMPAGLIFLOZIN 10 MG: 10 TABLET, FILM COATED ORAL at 13:22

## 2025-02-19 RX ADMIN — CALCITRIOL 0.5 MCG: 0.5 CAPSULE, LIQUID FILLED ORAL at 13:23

## 2025-02-19 RX ADMIN — ATORVASTATIN CALCIUM 40 MG: 40 TABLET, FILM COATED ORAL at 21:13

## 2025-02-19 RX ADMIN — OXYCODONE HYDROCHLORIDE AND ACETAMINOPHEN 1 TABLET: 7.5; 325 TABLET ORAL at 16:09

## 2025-02-19 RX ADMIN — HEPARIN SODIUM 5000 UNITS: 5000 INJECTION INTRAVENOUS; SUBCUTANEOUS at 13:22

## 2025-02-19 RX ADMIN — DIPHENHYDRAMINE HYDROCHLORIDE 50 MG: 50 INJECTION, SOLUTION INTRAMUSCULAR; INTRAVENOUS at 13:22

## 2025-02-19 RX ADMIN — SODIUM CHLORIDE, PRESERVATIVE FREE 10 ML: 5 INJECTION INTRAVENOUS at 13:24

## 2025-02-19 RX ADMIN — HEPARIN SODIUM 5000 UNITS: 5000 INJECTION INTRAVENOUS; SUBCUTANEOUS at 21:34

## 2025-02-19 RX ADMIN — CLOPIDOGREL BISULFATE 75 MG: 75 TABLET ORAL at 13:23

## 2025-02-19 RX ADMIN — ASPIRIN 81 MG: 81 TABLET, CHEWABLE ORAL at 13:22

## 2025-02-19 RX ADMIN — SODIUM CHLORIDE, PRESERVATIVE FREE 10 ML: 5 INJECTION INTRAVENOUS at 21:18

## 2025-02-19 RX ADMIN — OXYCODONE HYDROCHLORIDE AND ACETAMINOPHEN 1 TABLET: 7.5; 325 TABLET ORAL at 07:14

## 2025-02-19 ASSESSMENT — PAIN DESCRIPTION - LOCATION
LOCATION: LEG
LOCATION: LEG

## 2025-02-19 ASSESSMENT — PAIN DESCRIPTION - ORIENTATION: ORIENTATION: LEFT;RIGHT

## 2025-02-19 ASSESSMENT — PAIN SCALES - GENERAL
PAINLEVEL_OUTOF10: 7
PAINLEVEL_OUTOF10: 8

## 2025-02-19 ASSESSMENT — PAIN DESCRIPTION - DESCRIPTORS: DESCRIPTORS: ACHING

## 2025-02-20 LAB
ANION GAP SERPL CALCULATED.3IONS-SCNC: 16 MMOL/L (ref 9–17)
BUN SERPL-MCNC: 57 MG/DL (ref 7–20)
CALCIUM SERPL-MCNC: 9.5 MG/DL (ref 8.3–10.6)
CHLORIDE SERPL-SCNC: 96 MMOL/L (ref 99–110)
CO2 SERPL-SCNC: 24 MMOL/L (ref 21–32)
CREAT SERPL-MCNC: 7 MG/DL (ref 0.9–1.3)
CRP SERPL HS-MCNC: 22.9 MG/L (ref 0–5)
DATE LAST DOSE: NORMAL
GFR, ESTIMATED: 8 ML/MIN/1.73M2
GLUCOSE BLD-MCNC: 167 MG/DL (ref 74–99)
GLUCOSE BLD-MCNC: 184 MG/DL (ref 74–99)
GLUCOSE BLD-MCNC: 202 MG/DL (ref 74–99)
GLUCOSE SERPL-MCNC: 163 MG/DL (ref 74–99)
MAGNESIUM SERPL-MCNC: 2.4 MG/DL (ref 1.8–2.4)
POTASSIUM SERPL-SCNC: 5.4 MMOL/L (ref 3.5–5.1)
SODIUM SERPL-SCNC: 136 MMOL/L (ref 136–145)
TME LAST DOSE: NORMAL H
VANCOMYCIN DOSE: NORMAL MG
VANCOMYCIN SERPL-MCNC: 12 UG/ML (ref 10–20)

## 2025-02-20 PROCEDURE — 80048 BASIC METABOLIC PNL TOTAL CA: CPT

## 2025-02-20 PROCEDURE — 6370000000 HC RX 637 (ALT 250 FOR IP): Performed by: INTERNAL MEDICINE

## 2025-02-20 PROCEDURE — 83735 ASSAY OF MAGNESIUM: CPT

## 2025-02-20 PROCEDURE — 99232 SBSQ HOSP IP/OBS MODERATE 35: CPT | Performed by: INTERNAL MEDICINE

## 2025-02-20 PROCEDURE — 80202 ASSAY OF VANCOMYCIN: CPT

## 2025-02-20 PROCEDURE — G0545 PR INHERENT VISIT TO INPT: HCPCS | Performed by: NURSE PRACTITIONER

## 2025-02-20 PROCEDURE — 86140 C-REACTIVE PROTEIN: CPT

## 2025-02-20 PROCEDURE — 6360000002 HC RX W HCPCS: Performed by: INTERNAL MEDICINE

## 2025-02-20 PROCEDURE — 2580000003 HC RX 258: Performed by: NURSE PRACTITIONER

## 2025-02-20 PROCEDURE — 1200000000 HC SEMI PRIVATE

## 2025-02-20 PROCEDURE — 6360000002 HC RX W HCPCS: Performed by: NURSE PRACTITIONER

## 2025-02-20 PROCEDURE — 2500000003 HC RX 250 WO HCPCS: Performed by: INTERNAL MEDICINE

## 2025-02-20 PROCEDURE — 99232 SBSQ HOSP IP/OBS MODERATE 35: CPT | Performed by: NURSE PRACTITIONER

## 2025-02-20 PROCEDURE — 82962 GLUCOSE BLOOD TEST: CPT

## 2025-02-20 PROCEDURE — 90935 HEMODIALYSIS ONE EVALUATION: CPT

## 2025-02-20 RX ORDER — HEPARIN SODIUM 1000 [USP'U]/ML
3000 INJECTION, SOLUTION INTRAVENOUS; SUBCUTANEOUS ONCE
Status: COMPLETED | OUTPATIENT
Start: 2025-02-20 | End: 2025-02-20

## 2025-02-20 RX ORDER — HEPARIN SODIUM 5000 [USP'U]/ML
5000 INJECTION, SOLUTION INTRAVENOUS; SUBCUTANEOUS ONCE
Status: DISCONTINUED | OUTPATIENT
Start: 2025-02-20 | End: 2025-02-20

## 2025-02-20 RX ORDER — HEPARIN SODIUM 5000 [USP'U]/ML
5000 INJECTION, SOLUTION INTRAVENOUS; SUBCUTANEOUS EVERY 8 HOURS SCHEDULED
Status: DISCONTINUED | OUTPATIENT
Start: 2025-02-20 | End: 2025-02-20

## 2025-02-20 RX ADMIN — HEPARIN SODIUM 5000 UNITS: 5000 INJECTION INTRAVENOUS; SUBCUTANEOUS at 15:32

## 2025-02-20 RX ADMIN — SODIUM CHLORIDE, PRESERVATIVE FREE 10 ML: 5 INJECTION INTRAVENOUS at 14:27

## 2025-02-20 RX ADMIN — HEPARIN SODIUM 5000 UNITS: 5000 INJECTION INTRAVENOUS; SUBCUTANEOUS at 05:46

## 2025-02-20 RX ADMIN — ROPINIROLE HYDROCHLORIDE 0.25 MG: 0.25 TABLET, FILM COATED ORAL at 20:31

## 2025-02-20 RX ADMIN — METOPROLOL SUCCINATE 25 MG: 25 TABLET, EXTENDED RELEASE ORAL at 13:06

## 2025-02-20 RX ADMIN — VANCOMYCIN HYDROCHLORIDE 1250 MG: 1.25 INJECTION, POWDER, LYOPHILIZED, FOR SOLUTION INTRAVENOUS at 16:01

## 2025-02-20 RX ADMIN — SODIUM CHLORIDE, PRESERVATIVE FREE 10 ML: 5 INJECTION INTRAVENOUS at 20:32

## 2025-02-20 RX ADMIN — DIPHENHYDRAMINE HYDROCHLORIDE 50 MG: 50 INJECTION, SOLUTION INTRAMUSCULAR; INTRAVENOUS at 15:31

## 2025-02-20 RX ADMIN — HEPARIN SODIUM 5000 UNITS: 5000 INJECTION INTRAVENOUS; SUBCUTANEOUS at 20:33

## 2025-02-20 RX ADMIN — HEPARIN SODIUM 3000 UNITS: 1000 INJECTION INTRAVENOUS; SUBCUTANEOUS at 09:49

## 2025-02-20 RX ADMIN — OXYCODONE HYDROCHLORIDE AND ACETAMINOPHEN 1 TABLET: 7.5; 325 TABLET ORAL at 20:31

## 2025-02-20 RX ADMIN — ISOSORBIDE MONONITRATE 60 MG: 60 TABLET, EXTENDED RELEASE ORAL at 13:05

## 2025-02-20 RX ADMIN — HYDRALAZINE HYDROCHLORIDE 25 MG: 25 TABLET ORAL at 13:05

## 2025-02-20 RX ADMIN — DIPHENHYDRAMINE HYDROCHLORIDE 50 MG: 50 INJECTION, SOLUTION INTRAMUSCULAR; INTRAVENOUS at 02:13

## 2025-02-20 RX ADMIN — DIPHENHYDRAMINE HYDROCHLORIDE 50 MG: 50 INJECTION, SOLUTION INTRAMUSCULAR; INTRAVENOUS at 22:07

## 2025-02-20 RX ADMIN — HYDRALAZINE HYDROCHLORIDE 25 MG: 25 TABLET ORAL at 20:31

## 2025-02-20 RX ADMIN — PANTOPRAZOLE SODIUM 40 MG: 40 TABLET, DELAYED RELEASE ORAL at 13:05

## 2025-02-20 RX ADMIN — OXYCODONE HYDROCHLORIDE AND ACETAMINOPHEN 1 TABLET: 7.5; 325 TABLET ORAL at 13:05

## 2025-02-20 RX ADMIN — INSULIN GLARGINE 8 UNITS: 100 INJECTION, SOLUTION SUBCUTANEOUS at 20:32

## 2025-02-20 RX ADMIN — ASPIRIN 81 MG: 81 TABLET, CHEWABLE ORAL at 13:05

## 2025-02-20 RX ADMIN — INSULIN LISPRO 1 UNITS: 100 INJECTION, SOLUTION INTRAVENOUS; SUBCUTANEOUS at 20:32

## 2025-02-20 RX ADMIN — DIPHENHYDRAMINE HYDROCHLORIDE 50 MG: 50 INJECTION, SOLUTION INTRAMUSCULAR; INTRAVENOUS at 08:36

## 2025-02-20 RX ADMIN — CLOPIDOGREL BISULFATE 75 MG: 75 TABLET ORAL at 13:05

## 2025-02-20 RX ADMIN — OXYCODONE HYDROCHLORIDE AND ACETAMINOPHEN 1 TABLET: 7.5; 325 TABLET ORAL at 02:12

## 2025-02-20 RX ADMIN — EMPAGLIFLOZIN 10 MG: 10 TABLET, FILM COATED ORAL at 13:05

## 2025-02-20 RX ADMIN — ATORVASTATIN CALCIUM 40 MG: 40 TABLET, FILM COATED ORAL at 20:31

## 2025-02-20 ASSESSMENT — PAIN DESCRIPTION - ORIENTATION
ORIENTATION: RIGHT;LEFT
ORIENTATION: RIGHT

## 2025-02-20 ASSESSMENT — PAIN SCALES - GENERAL
PAINLEVEL_OUTOF10: 0
PAINLEVEL_OUTOF10: 7
PAINLEVEL_OUTOF10: 0
PAINLEVEL_OUTOF10: 8

## 2025-02-20 ASSESSMENT — PAIN DESCRIPTION - LOCATION
LOCATION: LEG
LOCATION: LEG;FOOT

## 2025-02-20 ASSESSMENT — PAIN DESCRIPTION - DESCRIPTORS
DESCRIPTORS: ACHING
DESCRIPTORS: ACHING

## 2025-02-20 NOTE — DISCHARGE INSTRUCTIONS
What is Dial-A-Ride:  Origin to Destination service within 3/4 mile of the established fixed route service area by appointment for all citizens.  Dial-A-Ride Fare $ 4.00  To schedule a Dial-A-Ride Trip:  Call [phone], Option 1. If you call after hours, weekends or on a holiday, a voice message system is available. Appointments can be scheduled the day prior to trip. Same day service may be available, if possible. Hearing impaired individuals should call the [employer] at [phone].  To cancel a Dial-A-Ride Trip:  Call at least one hour prior to the scheduled trip or the trip will be considered a “No Show”.  Transportation Department of [employer] and Family [employer]  Program Provides Short-Term Assistance  The Transportation Program assists qualifying residents of [facility] with transportation necessary for work-related activities or medical appointments.    The program provides bus tickets, gas cards and point-to-point transportation through a van service. All transportation services require a 5-business-day notice. For more information please call [phone].    Medical transportation services:  Are limited to persons approved for Medicaid  Are limited to appointments for a Medicaid-covered service  Work-related transportation services:  Are subject to financial guidelines  Might include a fee          [employer]   [phone]    [facility] Transit offers services throughout the [facility], Monday through Friday beginning at 6:40 AM and ending at 6:40 PM.  Transit services include:  Fixed-route:  Large buses that travel throughout the city on designated routes with established route timepoints.    Dial-A-Ride:  Select “curb to curb” service that operates by appointment.  This service does not require certification and is open to the public.  Current cost for Dial-A-Ride service is $4.00 per person, one way.    [employer] Paratransit:  Select “curb to curb” service specifically

## 2025-02-21 ENCOUNTER — APPOINTMENT (OUTPATIENT)
Dept: MRI IMAGING | Age: 50
DRG: 291 | End: 2025-02-21
Payer: MEDICARE

## 2025-02-21 PROBLEM — L08.9 DIABETIC FOOT INFECTION (HCC): Status: ACTIVE | Noted: 2025-02-21

## 2025-02-21 PROBLEM — E11.628 DIABETIC FOOT INFECTION (HCC): Status: ACTIVE | Noted: 2025-02-21

## 2025-02-21 LAB
ANION GAP SERPL CALCULATED.3IONS-SCNC: 12 MMOL/L (ref 9–17)
BNP SERPL-MCNC: ABNORMAL PG/ML (ref 0–125)
BUN SERPL-MCNC: 29 MG/DL (ref 7–20)
CALCIUM SERPL-MCNC: 9.4 MG/DL (ref 8.3–10.6)
CHLORIDE SERPL-SCNC: 96 MMOL/L (ref 99–110)
CO2 SERPL-SCNC: 28 MMOL/L (ref 21–32)
CREAT SERPL-MCNC: 4.4 MG/DL (ref 0.9–1.3)
GFR, ESTIMATED: 14 ML/MIN/1.73M2
GLUCOSE BLD-MCNC: 162 MG/DL (ref 74–99)
GLUCOSE BLD-MCNC: 196 MG/DL (ref 74–99)
GLUCOSE BLD-MCNC: 204 MG/DL (ref 74–99)
GLUCOSE BLD-MCNC: 99 MG/DL (ref 74–99)
GLUCOSE SERPL-MCNC: 117 MG/DL (ref 74–99)
MAGNESIUM SERPL-MCNC: 2.1 MG/DL (ref 1.8–2.4)
POTASSIUM SERPL-SCNC: 3.7 MMOL/L (ref 3.5–5.1)
SODIUM SERPL-SCNC: 136 MMOL/L (ref 136–145)

## 2025-02-21 PROCEDURE — 6360000002 HC RX W HCPCS: Performed by: INTERNAL MEDICINE

## 2025-02-21 PROCEDURE — 6370000000 HC RX 637 (ALT 250 FOR IP): Performed by: INTERNAL MEDICINE

## 2025-02-21 PROCEDURE — 1200000000 HC SEMI PRIVATE

## 2025-02-21 PROCEDURE — 36415 COLL VENOUS BLD VENIPUNCTURE: CPT

## 2025-02-21 PROCEDURE — 90935 HEMODIALYSIS ONE EVALUATION: CPT

## 2025-02-21 PROCEDURE — 2700000000 HC OXYGEN THERAPY PER DAY

## 2025-02-21 PROCEDURE — 82962 GLUCOSE BLOOD TEST: CPT

## 2025-02-21 PROCEDURE — 2500000003 HC RX 250 WO HCPCS: Performed by: INTERNAL MEDICINE

## 2025-02-21 PROCEDURE — 83880 ASSAY OF NATRIURETIC PEPTIDE: CPT

## 2025-02-21 PROCEDURE — 80048 BASIC METABOLIC PNL TOTAL CA: CPT

## 2025-02-21 PROCEDURE — 83735 ASSAY OF MAGNESIUM: CPT

## 2025-02-21 PROCEDURE — 99232 SBSQ HOSP IP/OBS MODERATE 35: CPT | Performed by: INTERNAL MEDICINE

## 2025-02-21 PROCEDURE — 94761 N-INVAS EAR/PLS OXIMETRY MLT: CPT

## 2025-02-21 RX ORDER — LORAZEPAM 2 MG/ML
1 INJECTION INTRAMUSCULAR
Status: COMPLETED | OUTPATIENT
Start: 2025-02-22 | End: 2025-02-22

## 2025-02-21 RX ORDER — HEPARIN SODIUM 1000 [USP'U]/ML
2000 INJECTION, SOLUTION INTRAVENOUS; SUBCUTANEOUS
Status: COMPLETED | OUTPATIENT
Start: 2025-02-21 | End: 2025-02-21

## 2025-02-21 RX ADMIN — DIPHENHYDRAMINE HYDROCHLORIDE 50 MG: 50 INJECTION, SOLUTION INTRAMUSCULAR; INTRAVENOUS at 04:56

## 2025-02-21 RX ADMIN — INSULIN LISPRO 1 UNITS: 100 INJECTION, SOLUTION INTRAVENOUS; SUBCUTANEOUS at 20:30

## 2025-02-21 RX ADMIN — ATORVASTATIN CALCIUM 40 MG: 40 TABLET, FILM COATED ORAL at 20:30

## 2025-02-21 RX ADMIN — HYDRALAZINE HYDROCHLORIDE 25 MG: 25 TABLET ORAL at 20:30

## 2025-02-21 RX ADMIN — DIPHENHYDRAMINE HYDROCHLORIDE 50 MG: 50 INJECTION, SOLUTION INTRAMUSCULAR; INTRAVENOUS at 20:30

## 2025-02-21 RX ADMIN — OXYCODONE HYDROCHLORIDE AND ACETAMINOPHEN 1 TABLET: 7.5; 325 TABLET ORAL at 12:42

## 2025-02-21 RX ADMIN — SODIUM CHLORIDE, PRESERVATIVE FREE 10 ML: 5 INJECTION INTRAVENOUS at 12:56

## 2025-02-21 RX ADMIN — METOPROLOL SUCCINATE 25 MG: 25 TABLET, EXTENDED RELEASE ORAL at 12:46

## 2025-02-21 RX ADMIN — HYDRALAZINE HYDROCHLORIDE 25 MG: 25 TABLET ORAL at 12:47

## 2025-02-21 RX ADMIN — HEPARIN SODIUM 5000 UNITS: 5000 INJECTION INTRAVENOUS; SUBCUTANEOUS at 20:30

## 2025-02-21 RX ADMIN — LABETALOL HYDROCHLORIDE 10 MG: 5 INJECTION, SOLUTION INTRAVENOUS at 04:55

## 2025-02-21 RX ADMIN — DIPHENHYDRAMINE HYDROCHLORIDE 50 MG: 50 INJECTION, SOLUTION INTRAMUSCULAR; INTRAVENOUS at 12:55

## 2025-02-21 RX ADMIN — EMPAGLIFLOZIN 10 MG: 10 TABLET, FILM COATED ORAL at 12:50

## 2025-02-21 RX ADMIN — ASPIRIN 81 MG: 81 TABLET, CHEWABLE ORAL at 12:47

## 2025-02-21 RX ADMIN — SODIUM CHLORIDE, PRESERVATIVE FREE 10 ML: 5 INJECTION INTRAVENOUS at 20:31

## 2025-02-21 RX ADMIN — ISOSORBIDE MONONITRATE 60 MG: 60 TABLET, EXTENDED RELEASE ORAL at 12:47

## 2025-02-21 RX ADMIN — PANTOPRAZOLE SODIUM 40 MG: 40 TABLET, DELAYED RELEASE ORAL at 13:02

## 2025-02-21 RX ADMIN — ROPINIROLE HYDROCHLORIDE 0.25 MG: 0.25 TABLET, FILM COATED ORAL at 20:31

## 2025-02-21 RX ADMIN — OXYCODONE HYDROCHLORIDE AND ACETAMINOPHEN 1 TABLET: 7.5; 325 TABLET ORAL at 04:56

## 2025-02-21 RX ADMIN — HEPARIN SODIUM 5000 UNITS: 5000 INJECTION INTRAVENOUS; SUBCUTANEOUS at 04:56

## 2025-02-21 RX ADMIN — HEPARIN SODIUM 2000 UNITS: 1000 INJECTION INTRAVENOUS; SUBCUTANEOUS at 08:30

## 2025-02-21 RX ADMIN — CLOPIDOGREL BISULFATE 75 MG: 75 TABLET ORAL at 12:47

## 2025-02-21 RX ADMIN — INSULIN GLARGINE 8 UNITS: 100 INJECTION, SOLUTION SUBCUTANEOUS at 20:30

## 2025-02-21 RX ADMIN — INSULIN LISPRO 1 UNITS: 100 INJECTION, SOLUTION INTRAVENOUS; SUBCUTANEOUS at 17:33

## 2025-02-21 RX ADMIN — OXYCODONE HYDROCHLORIDE AND ACETAMINOPHEN 1 TABLET: 7.5; 325 TABLET ORAL at 20:30

## 2025-02-21 RX ADMIN — HEPARIN SODIUM 5000 UNITS: 5000 INJECTION INTRAVENOUS; SUBCUTANEOUS at 14:42

## 2025-02-21 ASSESSMENT — PAIN SCALES - GENERAL
PAINLEVEL_OUTOF10: 4
PAINLEVEL_OUTOF10: 0
PAINLEVEL_OUTOF10: 8
PAINLEVEL_OUTOF10: 5

## 2025-02-21 ASSESSMENT — PAIN DESCRIPTION - DESCRIPTORS
DESCRIPTORS: ACHING
DESCRIPTORS: STABBING;THROBBING

## 2025-02-21 ASSESSMENT — PAIN DESCRIPTION - LOCATION
LOCATION: FOOT;LEG
LOCATION: FOOT

## 2025-02-21 ASSESSMENT — PAIN DESCRIPTION - ORIENTATION: ORIENTATION: RIGHT;LEFT

## 2025-02-21 ASSESSMENT — PAIN SCALES - WONG BAKER: WONGBAKER_NUMERICALRESPONSE: HURTS A LITTLE BIT

## 2025-02-21 NOTE — PLAN OF CARE
Problem: Pain  Goal: Verbalizes/displays adequate comfort level or baseline comfort level  Outcome: Progressing  Flowsheets (Taken 2/20/2025 2000)  Verbalizes/displays adequate comfort level or baseline comfort level:   Encourage patient to monitor pain and request assistance   Assess pain using appropriate pain scale   Administer analgesics based on type and severity of pain and evaluate response   Implement non-pharmacological measures as appropriate and evaluate response     Problem: Chronic Conditions and Co-morbidities  Goal: Patient's chronic conditions and co-morbidity symptoms are monitored and maintained or improved  Outcome: Progressing  Flowsheets (Taken 2/20/2025 2000)  Care Plan - Patient's Chronic Conditions and Co-Morbidity Symptoms are Monitored and Maintained or Improved:   Monitor and assess patient's chronic conditions and comorbid symptoms for stability, deterioration, or improvement   Collaborate with multidisciplinary team to address chronic and comorbid conditions and prevent exacerbation or deterioration   Update acute care plan with appropriate goals if chronic or comorbid symptoms are exacerbated and prevent overall improvement and discharge     Problem: Discharge Planning  Goal: Discharge to home or other facility with appropriate resources  Outcome: Progressing  Flowsheets (Taken 2/20/2025 2000)  Discharge to home or other facility with appropriate resources:   Identify barriers to discharge with patient and caregiver   Arrange for needed discharge resources and transportation as appropriate   Identify discharge learning needs (meds, wound care, etc)     Problem: Safety - Adult  Goal: Free from fall injury  Outcome: Progressing

## 2025-02-21 NOTE — PLAN OF CARE
Problem: Pain  Goal: Verbalizes/displays adequate comfort level or baseline comfort level  Outcome: Progressing     Problem: Chronic Conditions and Co-morbidities  Goal: Patient's chronic conditions and co-morbidity symptoms are monitored and maintained or improved  Outcome: Progressing     Problem: Discharge Planning  Goal: Discharge to home or other facility with appropriate resources  Outcome: Progressing

## 2025-02-22 ENCOUNTER — APPOINTMENT (OUTPATIENT)
Dept: MRI IMAGING | Age: 50
DRG: 291 | End: 2025-02-22
Payer: MEDICARE

## 2025-02-22 LAB
ANION GAP SERPL CALCULATED.3IONS-SCNC: 13 MMOL/L (ref 9–17)
BUN SERPL-MCNC: 55 MG/DL (ref 7–20)
CALCIUM SERPL-MCNC: 9.5 MG/DL (ref 8.3–10.6)
CHLORIDE SERPL-SCNC: 99 MMOL/L (ref 99–110)
CO2 SERPL-SCNC: 27 MMOL/L (ref 21–32)
CREAT SERPL-MCNC: 6.4 MG/DL (ref 0.9–1.3)
DATE LAST DOSE: NORMAL
GFR, ESTIMATED: 9 ML/MIN/1.73M2
GLUCOSE BLD-MCNC: 135 MG/DL (ref 74–99)
GLUCOSE BLD-MCNC: 172 MG/DL (ref 74–99)
GLUCOSE BLD-MCNC: 178 MG/DL (ref 74–99)
GLUCOSE BLD-MCNC: 66 MG/DL (ref 74–99)
GLUCOSE SERPL-MCNC: 132 MG/DL (ref 74–99)
MAGNESIUM SERPL-MCNC: 2.3 MG/DL (ref 1.8–2.4)
POTASSIUM SERPL-SCNC: 4.8 MMOL/L (ref 3.5–5.1)
SODIUM SERPL-SCNC: 139 MMOL/L (ref 136–145)
TME LAST DOSE: NORMAL H
VANCOMYCIN DOSE: NORMAL MG
VANCOMYCIN SERPL-MCNC: 17 UG/ML (ref 10–20)

## 2025-02-22 PROCEDURE — 6370000000 HC RX 637 (ALT 250 FOR IP): Performed by: INTERNAL MEDICINE

## 2025-02-22 PROCEDURE — 83735 ASSAY OF MAGNESIUM: CPT

## 2025-02-22 PROCEDURE — 6360000002 HC RX W HCPCS: Performed by: NURSE PRACTITIONER

## 2025-02-22 PROCEDURE — 94761 N-INVAS EAR/PLS OXIMETRY MLT: CPT

## 2025-02-22 PROCEDURE — 6360000002 HC RX W HCPCS: Performed by: INTERNAL MEDICINE

## 2025-02-22 PROCEDURE — 2580000003 HC RX 258: Performed by: NURSE PRACTITIONER

## 2025-02-22 PROCEDURE — 73718 MRI LOWER EXTREMITY W/O DYE: CPT

## 2025-02-22 PROCEDURE — 82962 GLUCOSE BLOOD TEST: CPT

## 2025-02-22 PROCEDURE — 80202 ASSAY OF VANCOMYCIN: CPT

## 2025-02-22 PROCEDURE — 1200000000 HC SEMI PRIVATE

## 2025-02-22 PROCEDURE — 2700000000 HC OXYGEN THERAPY PER DAY

## 2025-02-22 PROCEDURE — 80048 BASIC METABOLIC PNL TOTAL CA: CPT

## 2025-02-22 PROCEDURE — 90935 HEMODIALYSIS ONE EVALUATION: CPT

## 2025-02-22 PROCEDURE — 36415 COLL VENOUS BLD VENIPUNCTURE: CPT

## 2025-02-22 PROCEDURE — 2500000003 HC RX 250 WO HCPCS: Performed by: INTERNAL MEDICINE

## 2025-02-22 RX ORDER — HEPARIN SODIUM 1000 [USP'U]/ML
4000 INJECTION, SOLUTION INTRAVENOUS; SUBCUTANEOUS
Status: COMPLETED | OUTPATIENT
Start: 2025-02-22 | End: 2025-02-22

## 2025-02-22 RX ADMIN — DIPHENHYDRAMINE HYDROCHLORIDE 50 MG: 50 INJECTION, SOLUTION INTRAMUSCULAR; INTRAVENOUS at 21:26

## 2025-02-22 RX ADMIN — OXYCODONE HYDROCHLORIDE AND ACETAMINOPHEN 1 TABLET: 7.5; 325 TABLET ORAL at 05:44

## 2025-02-22 RX ADMIN — HEPARIN SODIUM 4000 UNITS: 1000 INJECTION INTRAVENOUS; SUBCUTANEOUS at 11:10

## 2025-02-22 RX ADMIN — ATORVASTATIN CALCIUM 40 MG: 40 TABLET, FILM COATED ORAL at 21:26

## 2025-02-22 RX ADMIN — DIPHENHYDRAMINE HYDROCHLORIDE 50 MG: 50 INJECTION, SOLUTION INTRAMUSCULAR; INTRAVENOUS at 05:44

## 2025-02-22 RX ADMIN — VANCOMYCIN HYDROCHLORIDE 1250 MG: 1.25 INJECTION, POWDER, LYOPHILIZED, FOR SOLUTION INTRAVENOUS at 17:31

## 2025-02-22 RX ADMIN — HEPARIN SODIUM 5000 UNITS: 5000 INJECTION INTRAVENOUS; SUBCUTANEOUS at 05:44

## 2025-02-22 RX ADMIN — LORAZEPAM 1 MG: 2 INJECTION INTRAMUSCULAR; INTRAVENOUS at 13:00

## 2025-02-22 RX ADMIN — OXYCODONE HYDROCHLORIDE AND ACETAMINOPHEN 1 TABLET: 7.5; 325 TABLET ORAL at 21:25

## 2025-02-22 RX ADMIN — ROPINIROLE HYDROCHLORIDE 0.25 MG: 0.25 TABLET, FILM COATED ORAL at 21:26

## 2025-02-22 RX ADMIN — HYDRALAZINE HYDROCHLORIDE 25 MG: 25 TABLET ORAL at 21:26

## 2025-02-22 RX ADMIN — LORAZEPAM 1 MG: 2 INJECTION INTRAMUSCULAR; INTRAVENOUS at 13:24

## 2025-02-22 RX ADMIN — LABETALOL HYDROCHLORIDE 10 MG: 5 INJECTION, SOLUTION INTRAVENOUS at 15:17

## 2025-02-22 RX ADMIN — INSULIN GLARGINE 8 UNITS: 100 INJECTION, SOLUTION SUBCUTANEOUS at 21:25

## 2025-02-22 RX ADMIN — HYDRALAZINE HYDROCHLORIDE 10 MG: 20 INJECTION INTRAMUSCULAR; INTRAVENOUS at 18:22

## 2025-02-22 RX ADMIN — SODIUM CHLORIDE, PRESERVATIVE FREE 10 ML: 5 INJECTION INTRAVENOUS at 21:24

## 2025-02-22 RX ADMIN — HEPARIN SODIUM 5000 UNITS: 5000 INJECTION INTRAVENOUS; SUBCUTANEOUS at 21:26

## 2025-02-22 ASSESSMENT — PAIN DESCRIPTION - LOCATION: LOCATION: FOOT

## 2025-02-22 ASSESSMENT — PAIN - FUNCTIONAL ASSESSMENT: PAIN_FUNCTIONAL_ASSESSMENT: ACTIVITIES ARE NOT PREVENTED

## 2025-02-22 ASSESSMENT — PAIN SCALES - GENERAL
PAINLEVEL_OUTOF10: 0
PAINLEVEL_OUTOF10: 8
PAINLEVEL_OUTOF10: 0

## 2025-02-22 ASSESSMENT — PAIN DESCRIPTION - DESCRIPTORS: DESCRIPTORS: ACHING

## 2025-02-22 ASSESSMENT — PAIN DESCRIPTION - ORIENTATION: ORIENTATION: RIGHT;LEFT

## 2025-02-22 NOTE — PLAN OF CARE
Problem: Pain  Goal: Verbalizes/displays adequate comfort level or baseline comfort level  2/22/2025 0037 by Liset Goodman RN  Outcome: Progressing  Flowsheets (Taken 2/21/2025 2000)  Verbalizes/displays adequate comfort level or baseline comfort level:   Encourage patient to monitor pain and request assistance   Assess pain using appropriate pain scale   Administer analgesics based on type and severity of pain and evaluate response   Implement non-pharmacological measures as appropriate and evaluate response  2/21/2025 1859 by Jailene Tyson RN  Outcome: Progressing     Problem: Chronic Conditions and Co-morbidities  Goal: Patient's chronic conditions and co-morbidity symptoms are monitored and maintained or improved  2/22/2025 0037 by Liset Goodman RN  Outcome: Progressing  Flowsheets (Taken 2/21/2025 2000)  Care Plan - Patient's Chronic Conditions and Co-Morbidity Symptoms are Monitored and Maintained or Improved:   Monitor and assess patient's chronic conditions and comorbid symptoms for stability, deterioration, or improvement   Collaborate with multidisciplinary team to address chronic and comorbid conditions and prevent exacerbation or deterioration   Update acute care plan with appropriate goals if chronic or comorbid symptoms are exacerbated and prevent overall improvement and discharge  2/21/2025 1859 by Jailene Tyson, RN  Outcome: Progressing     Problem: Discharge Planning  Goal: Discharge to home or other facility with appropriate resources  2/22/2025 0037 by Liset Goodman RN  Outcome: Progressing  Flowsheets (Taken 2/21/2025 2000)  Discharge to home or other facility with appropriate resources:   Identify barriers to discharge with patient and caregiver   Arrange for needed discharge resources and transportation as appropriate   Identify discharge learning needs (meds, wound care, etc)  2/21/2025 1859 by Jailene Tyson, RN  Outcome: Progressing     Problem: Safety - Adult  Goal: Free from fall

## 2025-02-23 LAB
ANION GAP SERPL CALCULATED.3IONS-SCNC: 13 MMOL/L (ref 9–17)
BNP SERPL-MCNC: ABNORMAL PG/ML (ref 0–125)
BUN SERPL-MCNC: 53 MG/DL (ref 7–20)
CALCIUM SERPL-MCNC: 9.3 MG/DL (ref 8.3–10.6)
CHLORIDE SERPL-SCNC: 101 MMOL/L (ref 99–110)
CO2 SERPL-SCNC: 25 MMOL/L (ref 21–32)
CREAT SERPL-MCNC: 6 MG/DL (ref 0.9–1.3)
GFR, ESTIMATED: 10 ML/MIN/1.73M2
GLUCOSE BLD-MCNC: 143 MG/DL (ref 74–99)
GLUCOSE BLD-MCNC: 143 MG/DL (ref 74–99)
GLUCOSE BLD-MCNC: 198 MG/DL (ref 74–99)
GLUCOSE BLD-MCNC: 254 MG/DL (ref 74–99)
GLUCOSE SERPL-MCNC: 151 MG/DL (ref 74–99)
POTASSIUM SERPL-SCNC: 4.6 MMOL/L (ref 3.5–5.1)
SODIUM SERPL-SCNC: 139 MMOL/L (ref 136–145)

## 2025-02-23 PROCEDURE — 36415 COLL VENOUS BLD VENIPUNCTURE: CPT

## 2025-02-23 PROCEDURE — 6370000000 HC RX 637 (ALT 250 FOR IP): Performed by: INTERNAL MEDICINE

## 2025-02-23 PROCEDURE — 2500000003 HC RX 250 WO HCPCS: Performed by: INTERNAL MEDICINE

## 2025-02-23 PROCEDURE — 80048 BASIC METABOLIC PNL TOTAL CA: CPT

## 2025-02-23 PROCEDURE — 6360000002 HC RX W HCPCS: Performed by: INTERNAL MEDICINE

## 2025-02-23 PROCEDURE — 94761 N-INVAS EAR/PLS OXIMETRY MLT: CPT

## 2025-02-23 PROCEDURE — 83880 ASSAY OF NATRIURETIC PEPTIDE: CPT

## 2025-02-23 PROCEDURE — 2700000000 HC OXYGEN THERAPY PER DAY

## 2025-02-23 PROCEDURE — 76937 US GUIDE VASCULAR ACCESS: CPT

## 2025-02-23 PROCEDURE — 82962 GLUCOSE BLOOD TEST: CPT

## 2025-02-23 PROCEDURE — 1200000000 HC SEMI PRIVATE

## 2025-02-23 RX ORDER — HYDRALAZINE HYDROCHLORIDE 50 MG/1
50 TABLET, FILM COATED ORAL EVERY 8 HOURS SCHEDULED
Status: DISCONTINUED | OUTPATIENT
Start: 2025-02-23 | End: 2025-02-24

## 2025-02-23 RX ORDER — INSULIN GLARGINE 100 [IU]/ML
5 INJECTION, SOLUTION SUBCUTANEOUS NIGHTLY
Status: DISCONTINUED | OUTPATIENT
Start: 2025-02-23 | End: 2025-02-24 | Stop reason: HOSPADM

## 2025-02-23 RX ADMIN — SODIUM CHLORIDE, PRESERVATIVE FREE 10 ML: 5 INJECTION INTRAVENOUS at 07:51

## 2025-02-23 RX ADMIN — HEPARIN SODIUM 5000 UNITS: 5000 INJECTION INTRAVENOUS; SUBCUTANEOUS at 21:30

## 2025-02-23 RX ADMIN — HYDRALAZINE HYDROCHLORIDE 50 MG: 50 TABLET ORAL at 21:17

## 2025-02-23 RX ADMIN — INSULIN LISPRO 1 UNITS: 100 INJECTION, SOLUTION INTRAVENOUS; SUBCUTANEOUS at 12:47

## 2025-02-23 RX ADMIN — EMPAGLIFLOZIN 10 MG: 10 TABLET, FILM COATED ORAL at 09:00

## 2025-02-23 RX ADMIN — SODIUM CHLORIDE, PRESERVATIVE FREE 10 ML: 5 INJECTION INTRAVENOUS at 09:42

## 2025-02-23 RX ADMIN — ROPINIROLE HYDROCHLORIDE 0.25 MG: 0.25 TABLET, FILM COATED ORAL at 21:18

## 2025-02-23 RX ADMIN — CLOPIDOGREL BISULFATE 75 MG: 75 TABLET ORAL at 09:00

## 2025-02-23 RX ADMIN — DIPHENHYDRAMINE HYDROCHLORIDE 50 MG: 50 INJECTION, SOLUTION INTRAMUSCULAR; INTRAVENOUS at 18:24

## 2025-02-23 RX ADMIN — LABETALOL HYDROCHLORIDE 10 MG: 5 INJECTION, SOLUTION INTRAVENOUS at 18:27

## 2025-02-23 RX ADMIN — ISOSORBIDE MONONITRATE 60 MG: 60 TABLET, EXTENDED RELEASE ORAL at 09:00

## 2025-02-23 RX ADMIN — HYDRALAZINE HYDROCHLORIDE 50 MG: 50 TABLET ORAL at 12:47

## 2025-02-23 RX ADMIN — DIPHENHYDRAMINE HYDROCHLORIDE 50 MG: 50 INJECTION, SOLUTION INTRAMUSCULAR; INTRAVENOUS at 09:11

## 2025-02-23 RX ADMIN — HEPARIN SODIUM 5000 UNITS: 5000 INJECTION INTRAVENOUS; SUBCUTANEOUS at 06:15

## 2025-02-23 RX ADMIN — INSULIN LISPRO 2 UNITS: 100 INJECTION, SOLUTION INTRAVENOUS; SUBCUTANEOUS at 21:15

## 2025-02-23 RX ADMIN — SODIUM CHLORIDE, PRESERVATIVE FREE 10 ML: 5 INJECTION INTRAVENOUS at 21:18

## 2025-02-23 RX ADMIN — HEPARIN SODIUM 5000 UNITS: 5000 INJECTION INTRAVENOUS; SUBCUTANEOUS at 12:47

## 2025-02-23 RX ADMIN — LABETALOL HYDROCHLORIDE 10 MG: 5 INJECTION, SOLUTION INTRAVENOUS at 06:28

## 2025-02-23 RX ADMIN — HYDRALAZINE HYDROCHLORIDE 10 MG: 20 INJECTION INTRAMUSCULAR; INTRAVENOUS at 07:48

## 2025-02-23 RX ADMIN — METOPROLOL SUCCINATE 25 MG: 25 TABLET, EXTENDED RELEASE ORAL at 09:00

## 2025-02-23 RX ADMIN — ASPIRIN 81 MG: 81 TABLET, CHEWABLE ORAL at 09:00

## 2025-02-23 RX ADMIN — PANTOPRAZOLE SODIUM 40 MG: 40 TABLET, DELAYED RELEASE ORAL at 09:00

## 2025-02-23 RX ADMIN — OXYCODONE HYDROCHLORIDE AND ACETAMINOPHEN 1 TABLET: 7.5; 325 TABLET ORAL at 18:24

## 2025-02-23 RX ADMIN — ATORVASTATIN CALCIUM 40 MG: 40 TABLET, FILM COATED ORAL at 21:16

## 2025-02-23 RX ADMIN — OXYCODONE HYDROCHLORIDE AND ACETAMINOPHEN 1 TABLET: 7.5; 325 TABLET ORAL at 09:11

## 2025-02-23 RX ADMIN — INSULIN GLARGINE 5 UNITS: 100 INJECTION, SOLUTION SUBCUTANEOUS at 21:16

## 2025-02-23 ASSESSMENT — PAIN SCALES - GENERAL
PAINLEVEL_OUTOF10: 8
PAINLEVEL_OUTOF10: 0
PAINLEVEL_OUTOF10: 8

## 2025-02-23 ASSESSMENT — PAIN DESCRIPTION - ORIENTATION
ORIENTATION: RIGHT;LEFT
ORIENTATION: RIGHT;LEFT

## 2025-02-23 ASSESSMENT — PAIN DESCRIPTION - DESCRIPTORS
DESCRIPTORS: THROBBING;SHARP
DESCRIPTORS: SHARP;THROBBING

## 2025-02-23 ASSESSMENT — PAIN DESCRIPTION - LOCATION
LOCATION: LEG;FOOT
LOCATION: FOOT;LEG

## 2025-02-24 VITALS
TEMPERATURE: 97.1 F | HEART RATE: 64 BPM | RESPIRATION RATE: 14 BRPM | BODY MASS INDEX: 40.98 KG/M2 | OXYGEN SATURATION: 96 % | SYSTOLIC BLOOD PRESSURE: 125 MMHG | HEIGHT: 69 IN | DIASTOLIC BLOOD PRESSURE: 62 MMHG | WEIGHT: 276.68 LBS

## 2025-02-24 LAB
ANION GAP SERPL CALCULATED.3IONS-SCNC: 16 MMOL/L (ref 9–17)
BUN SERPL-MCNC: 68 MG/DL (ref 7–20)
CALCIUM SERPL-MCNC: 8.6 MG/DL (ref 8.3–10.6)
CHLORIDE SERPL-SCNC: 99 MMOL/L (ref 99–110)
CO2 SERPL-SCNC: 22 MMOL/L (ref 21–32)
CREAT SERPL-MCNC: 7.6 MG/DL (ref 0.9–1.3)
DATE LAST DOSE: ABNORMAL
GFR, ESTIMATED: 8 ML/MIN/1.73M2
GLUCOSE BLD-MCNC: 166 MG/DL (ref 74–99)
GLUCOSE BLD-MCNC: 261 MG/DL (ref 74–99)
GLUCOSE BLD-MCNC: 88 MG/DL (ref 74–99)
GLUCOSE SERPL-MCNC: 154 MG/DL (ref 74–99)
POTASSIUM SERPL-SCNC: 5.4 MMOL/L (ref 3.5–5.1)
SODIUM SERPL-SCNC: 137 MMOL/L (ref 136–145)
TME LAST DOSE: ABNORMAL H
VANCOMYCIN DOSE: ABNORMAL MG
VANCOMYCIN SERPL-MCNC: 21.1 UG/ML (ref 10–20)

## 2025-02-24 PROCEDURE — 80048 BASIC METABOLIC PNL TOTAL CA: CPT

## 2025-02-24 PROCEDURE — 90935 HEMODIALYSIS ONE EVALUATION: CPT

## 2025-02-24 PROCEDURE — 80202 ASSAY OF VANCOMYCIN: CPT

## 2025-02-24 PROCEDURE — 6370000000 HC RX 637 (ALT 250 FOR IP): Performed by: INTERNAL MEDICINE

## 2025-02-24 PROCEDURE — 99232 SBSQ HOSP IP/OBS MODERATE 35: CPT | Performed by: NURSE PRACTITIONER

## 2025-02-24 PROCEDURE — 6360000002 HC RX W HCPCS: Performed by: INTERNAL MEDICINE

## 2025-02-24 PROCEDURE — 2500000003 HC RX 250 WO HCPCS: Performed by: INTERNAL MEDICINE

## 2025-02-24 PROCEDURE — 82962 GLUCOSE BLOOD TEST: CPT

## 2025-02-24 PROCEDURE — G0545 PR INHERENT VISIT TO INPT: HCPCS | Performed by: NURSE PRACTITIONER

## 2025-02-24 PROCEDURE — 6370000000 HC RX 637 (ALT 250 FOR IP): Performed by: STUDENT IN AN ORGANIZED HEALTH CARE EDUCATION/TRAINING PROGRAM

## 2025-02-24 PROCEDURE — 6370000000 HC RX 637 (ALT 250 FOR IP): Performed by: NURSE PRACTITIONER

## 2025-02-24 RX ORDER — HEPARIN SODIUM 1000 [USP'U]/ML
2000 INJECTION, SOLUTION INTRAVENOUS; SUBCUTANEOUS
Status: COMPLETED | OUTPATIENT
Start: 2025-02-24 | End: 2025-02-24

## 2025-02-24 RX ORDER — LINEZOLID 600 MG/1
600 TABLET, FILM COATED ORAL EVERY 12 HOURS SCHEDULED
Qty: 27 TABLET | Refills: 0 | Status: SHIPPED | OUTPATIENT
Start: 2025-02-24 | End: 2025-03-10

## 2025-02-24 RX ORDER — LINEZOLID 600 MG/1
600 TABLET, FILM COATED ORAL EVERY 12 HOURS SCHEDULED
Status: DISCONTINUED | OUTPATIENT
Start: 2025-02-24 | End: 2025-02-24 | Stop reason: HOSPADM

## 2025-02-24 RX ADMIN — DIPHENHYDRAMINE HYDROCHLORIDE 50 MG: 50 INJECTION, SOLUTION INTRAMUSCULAR; INTRAVENOUS at 16:33

## 2025-02-24 RX ADMIN — INSULIN LISPRO 2 UNITS: 100 INJECTION, SOLUTION INTRAVENOUS; SUBCUTANEOUS at 11:48

## 2025-02-24 RX ADMIN — ASPIRIN 81 MG: 81 TABLET, CHEWABLE ORAL at 08:56

## 2025-02-24 RX ADMIN — EMPAGLIFLOZIN 10 MG: 10 TABLET, FILM COATED ORAL at 09:02

## 2025-02-24 RX ADMIN — LINEZOLID 600 MG: 600 TABLET, FILM COATED ORAL at 11:47

## 2025-02-24 RX ADMIN — SODIUM CHLORIDE, PRESERVATIVE FREE 10 ML: 5 INJECTION INTRAVENOUS at 00:27

## 2025-02-24 RX ADMIN — SODIUM CHLORIDE, PRESERVATIVE FREE 10 ML: 5 INJECTION INTRAVENOUS at 09:04

## 2025-02-24 RX ADMIN — DIPHENHYDRAMINE HYDROCHLORIDE 50 MG: 50 INJECTION, SOLUTION INTRAMUSCULAR; INTRAVENOUS at 06:55

## 2025-02-24 RX ADMIN — OXYCODONE HYDROCHLORIDE AND ACETAMINOPHEN 1 TABLET: 7.5; 325 TABLET ORAL at 06:54

## 2025-02-24 RX ADMIN — HYDRALAZINE HYDROCHLORIDE 50 MG: 50 TABLET ORAL at 06:47

## 2025-02-24 RX ADMIN — HEPARIN SODIUM 5000 UNITS: 5000 INJECTION INTRAVENOUS; SUBCUTANEOUS at 16:33

## 2025-02-24 RX ADMIN — OXYCODONE HYDROCHLORIDE AND ACETAMINOPHEN 1 TABLET: 7.5; 325 TABLET ORAL at 16:33

## 2025-02-24 RX ADMIN — DIPHENHYDRAMINE HYDROCHLORIDE 50 MG: 50 INJECTION, SOLUTION INTRAMUSCULAR; INTRAVENOUS at 00:27

## 2025-02-24 RX ADMIN — HEPARIN SODIUM 5000 UNITS: 5000 INJECTION INTRAVENOUS; SUBCUTANEOUS at 06:49

## 2025-02-24 RX ADMIN — ISOSORBIDE MONONITRATE 60 MG: 60 TABLET, EXTENDED RELEASE ORAL at 08:57

## 2025-02-24 RX ADMIN — HEPARIN SODIUM 2000 UNITS: 1000 INJECTION INTRAVENOUS; SUBCUTANEOUS at 12:35

## 2025-02-24 RX ADMIN — MELATONIN TAB 3 MG 3 MG: 3 TAB at 00:30

## 2025-02-24 RX ADMIN — METOPROLOL SUCCINATE 25 MG: 25 TABLET, EXTENDED RELEASE ORAL at 08:57

## 2025-02-24 RX ADMIN — CLOPIDOGREL BISULFATE 75 MG: 75 TABLET ORAL at 08:56

## 2025-02-24 RX ADMIN — PANTOPRAZOLE SODIUM 40 MG: 40 TABLET, DELAYED RELEASE ORAL at 08:56

## 2025-02-24 RX ADMIN — HYDRALAZINE HYDROCHLORIDE 75 MG: 50 TABLET ORAL at 16:39

## 2025-02-24 ASSESSMENT — PAIN DESCRIPTION - ORIENTATION: ORIENTATION: RIGHT

## 2025-02-24 ASSESSMENT — PAIN DESCRIPTION - LOCATION
LOCATION: TOE (COMMENT WHICH ONE)
LOCATION: FOOT;LEG

## 2025-02-24 ASSESSMENT — PAIN DESCRIPTION - FREQUENCY: FREQUENCY: INTERMITTENT

## 2025-02-24 ASSESSMENT — PAIN SCALES - GENERAL
PAINLEVEL_OUTOF10: 8
PAINLEVEL_OUTOF10: 7

## 2025-02-24 ASSESSMENT — PAIN DESCRIPTION - PAIN TYPE: TYPE: CHRONIC PAIN

## 2025-02-24 ASSESSMENT — PAIN DESCRIPTION - DESCRIPTORS: DESCRIPTORS: ACHING;SHARP

## 2025-02-24 NOTE — PLAN OF CARE
Problem: Pain  Goal: Verbalizes/displays adequate comfort level or baseline comfort level  2/24/2025 0951 by Mariely Vincent RN  Outcome: Progressing  2/24/2025 0630 by Mohini Carter RN  Outcome: Progressing     Problem: Chronic Conditions and Co-morbidities  Goal: Patient's chronic conditions and co-morbidity symptoms are monitored and maintained or improved  2/24/2025 0951 by Mariely Vincent RN  Outcome: Progressing  2/24/2025 0630 by Mohini Carter RN  Outcome: Progressing     Problem: Discharge Planning  Goal: Discharge to home or other facility with appropriate resources  2/24/2025 0630 by Mohini Carter RN  Outcome: Progressing     Problem: Safety - Adult  Goal: Free from fall injury  2/24/2025 0630 by Mohini Carter RN  Outcome: Progressing     Problem: Skin/Tissue Integrity  Goal: Skin integrity remains intact  Description: 1.  Monitor for areas of redness and/or skin breakdown  2.  Assess vascular access sites hourly  3.  Every 4-6 hours minimum:  Change oxygen saturation probe site  4.  Every 4-6 hours:  If on nasal continuous positive airway pressure, respiratory therapy assess nares and determine need for appliance change or resting period  Recent Flowsheet Documentation  Taken 2/24/2025 0631 by Mohini Carter RN  Skin Integrity Remains Intact: Monitor for areas of redness and/or skin breakdown  2/24/2025 0630 by Mohini Carter RN  Outcome: Progressing     Problem: Chronic Conditions and Co-morbidities  Goal: Patient's chronic conditions and co-morbidity symptoms are monitored and maintained or improved  2/24/2025 0951 by Mariely Vincent RN  Outcome: Progressing  2/24/2025 0630 by Mohini Carter RN  Outcome: Progressing

## 2025-02-24 NOTE — PROGRESS NOTES
CARDIOLOGY PROGRESS NOTE                                                  Name:  Yovanny Levine /Age/Sex: 1975  (49 y.o. male)   MRN & CSN:  [id number] & [id number] Admission Date/Time: 2025 10:39 AM   Location:  -A PCP: José Luis Izquierdo MD         Admit Date:  2025  Hospital Day: 4      SUBJECTIVE:     Seen patient as follow up as consultation for Chest Pain    Mild chest pain.  No shortness of breath  No palpations    TELEMETRY: Sinus         Intake/Output Summary (Last 24 hours) at 2025  Last data filed at 2025 1427  Gross per 24 hour   Intake 510 ml   Output 3505 ml   Net -2995 ml       Assessment/Plan:      Atypical chest pain  // RESOLVED  Coronary disease status post PCI to LAD and circumflex artery in 2024  Essential hypertension  Hyperlipidemia  Diabetes mellitus  Polyneuropathy diabetic foot status post amputation.  End-stage renal disease on hemodialysis     Cardiac troponin is elevated, demand ischemia in absence of any EKG changes next  Continue with aspirin 81 mg daily  Continue Plavix 25 mg daily  Continue with Imdur 30 mg daily, will uptitrate the dose to 60 mg daily  Continue with beta-blocker metoprolol XL at 25 mg p.o. daily  High intensity statins Lipitor 40 mg p.o. daily  Patient is on Jardiance 10 mg daily  No further chest pain of angina quality  Recommend outpt follow up   Cardiology will sign off please call us with questions.              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 
    In-Patient Progress Note    Patient:  Yovanny Levine 49 y.o. male MRN: [id number]     Date of Service: 2/19/2025    Hospital Day: 3      Chief complaint: had concerns including Shortness of Breath (Left arm numbness onset 4am).      Assessment and Plan   HPI : \"Yovanny Levine is a 49 y.o. male with pmh of ESRD, CAD, RLS, type 2 diabetes, HTN who presents with sudden onset shortness of breath.  Patient states that he developed chest pain and shortness of breath yesterday.  He also started having significant swelling of bilateral lower extremities.  Patient states that he has been having runny nose over the last few days.  Denies any fevers or chills however did have nausea and vomiting and diarrhea yesterday.  Patient denies missing any dialysis sessions.  Chest pain is described as pressure-like, no radiation.  Pain has been going on since yesterday, constant.  Patient makes only very little urine.  Patient also does not know any specific sick contacts however he does state that there are always sick people at dialysis.  Patient does not smoke cigarettes however does smoke marijuana several times per week and does not drink alcohol. On arrival patient was hemodynamically stable, saturating 86% on room air.  Saturation improved to 100% on 2 L. Labs are significant for potassium of 6.0, troponin of 145--> 155, proBNP was greater than 70,000, negative for the flu. Patient received albuterol, calcium gluconate, insulin + D10, heparin.  Nephrology was consulted from the ED who is setting up dialysis. \"    Assessment and plan    Update: Admitted with complaints of shortness of breath, orthopnea, dyspnea on exertion.  Recently had left heart cath done on 12/2024 with MICAELA x 2 to LAD.  Cardiology saw the patient and escalated the dose of Imdur.  Patient was not having significant chest discomfort today.  No plans for left heart cath.  Diet resumed.  Infectious disease team evaluated the patient.  Antibiotic changed to IV 
    In-Patient Progress Note    Patient:  Yovanny Levine 49 y.o. male MRN: [id number]     Date of Service: 2/20/2025    Hospital Day: 4      Chief complaint: had concerns including Shortness of Breath (Left arm numbness onset 4am).      Assessment and Plan   HPI : \"Yovanny Levine is a 49 y.o. male with pmh of ESRD, CAD, RLS, type 2 diabetes, HTN who presents with sudden onset shortness of breath.  Patient states that he developed chest pain and shortness of breath yesterday.  He also started having significant swelling of bilateral lower extremities.  Patient states that he has been having runny nose over the last few days.  Denies any fevers or chills however did have nausea and vomiting and diarrhea yesterday.  Patient denies missing any dialysis sessions.  Chest pain is described as pressure-like, no radiation.  Pain has been going on since yesterday, constant.  Patient makes only very little urine.  Patient also does not know any specific sick contacts however he does state that there are always sick people at dialysis.  Patient does not smoke cigarettes however does smoke marijuana several times per week and does not drink alcohol. On arrival patient was hemodynamically stable, saturating 86% on room air.  Saturation improved to 100% on 2 L. Labs are significant for potassium of 6.0, troponin of 145--> 155, proBNP was greater than 70,000, negative for the flu. Patient received albuterol, calcium gluconate, insulin + D10, heparin.  Nephrology was consulted from the ED who is setting up dialysis. \"    Assessment and plan    Update: Patient seen and evaluated at bedside.  Does not complain of chest pain/discomfort.  Treating for suspected osteomyelitis.  MRI pending.  Appreciate cardiology and infectious disease team recommendation.      # Acute on chronic HFrEF exacerbation suspecting in the setting of URI, stable  -Patient with sudden onset shortness of breath and chest tightness which started yesterday 
    In-Patient Progress Note    Patient:  Yovanny Levine 49 y.o. male MRN: [id number]     Date of Service: 2/22/2025    Hospital Day: 6      Chief complaint: had concerns including Shortness of Breath (Left arm numbness onset 4am).      Assessment and Plan   HPI : \"Yovanny Levine is a 49 y.o. male with pmh of ESRD, CAD, RLS, type 2 diabetes, HTN who presents with sudden onset shortness of breath.  Patient states that he developed chest pain and shortness of breath yesterday.  He also started having significant swelling of bilateral lower extremities.  Patient states that he has been having runny nose over the last few days.  Denies any fevers or chills however did have nausea and vomiting and diarrhea yesterday.  Patient denies missing any dialysis sessions.  Chest pain is described as pressure-like, no radiation.  Pain has been going on since yesterday, constant.  Patient makes only very little urine.  Patient also does not know any specific sick contacts however he does state that there are always sick people at dialysis.  Patient does not smoke cigarettes however does smoke marijuana several times per week and does not drink alcohol. On arrival patient was hemodynamically stable, saturating 86% on room air.  Saturation improved to 100% on 2 L. Labs are significant for potassium of 6.0, troponin of 145--> 155, proBNP was greater than 70,000, negative for the flu. Patient received albuterol, calcium gluconate, insulin + D10, heparin.  Nephrology was consulted from the ED who is setting up dialysis. \"    Assessment and plan    Update: Patient seen and evaluated at bedside.  Does not complain of chest pain/discomfort.  Treating for suspected osteomyelitis.  MRI pending.  Appreciate cardiology and infectious disease team recommendation.      # Acute on chronic HFrEF exacerbation suspecting in the setting of URI, stable  -Patient with sudden onset shortness of breath and chest tightness which started yesterday 
    In-Patient Progress Note    Patient:  Yovanny Levine 49 y.o. male MRN: [id number]     Date of Service: 2/21/2025    Hospital Day: 5      Chief complaint: had concerns including Shortness of Breath (Left arm numbness onset 4am).      Assessment and Plan   HPI : \"Yovanny Levine is a 49 y.o. male with pmh of ESRD, CAD, RLS, type 2 diabetes, HTN who presents with sudden onset shortness of breath.  Patient states that he developed chest pain and shortness of breath yesterday.  He also started having significant swelling of bilateral lower extremities.  Patient states that he has been having runny nose over the last few days.  Denies any fevers or chills however did have nausea and vomiting and diarrhea yesterday.  Patient denies missing any dialysis sessions.  Chest pain is described as pressure-like, no radiation.  Pain has been going on since yesterday, constant.  Patient makes only very little urine.  Patient also does not know any specific sick contacts however he does state that there are always sick people at dialysis.  Patient does not smoke cigarettes however does smoke marijuana several times per week and does not drink alcohol. On arrival patient was hemodynamically stable, saturating 86% on room air.  Saturation improved to 100% on 2 L. Labs are significant for potassium of 6.0, troponin of 145--> 155, proBNP was greater than 70,000, negative for the flu. Patient received albuterol, calcium gluconate, insulin + D10, heparin.  Nephrology was consulted from the ED who is setting up dialysis. \"    Assessment and plan    Update: Patient seen and evaluated at bedside.  Does not complain of chest pain/discomfort.  Treating for suspected osteomyelitis.  MRI pending.  Appreciate cardiology and infectious disease team recommendation.      # Acute on chronic HFrEF exacerbation suspecting in the setting of URI, stable  -Patient with sudden onset shortness of breath and chest tightness which started yesterday 
  Physician Progress Note      PATIENT:               MICHELINE WILSON  CSN #:                  [id number]  :                       1975  ADMIT DATE:       2025 10:39 AM  DISCH DATE:  RESPONDING  PROVIDER #:        Ashlie Brown MD          QUERY TEXT:    Pt admitted with sob. Pt noted to also have CHF. If possible, please document   in progress notes and discharge summary the etiology of CHF, if able to be   determined.  The medical record reflects the following:  Risk Factors: ESRD, AoC combined CHF, DM  Clinical Indicators: PN : history significant for coronary disease and   essential hypertension. H&P: Acute on chronic HFrEF exacerbation, CAD-S/p PCI   and stent to proximal LAD in 2024  Treatment: EKG, serial troponins. aspirin, Plavix, atorvastatin, metoprolol,   Imdur  Options provided:  -- CHF due to Hypertensive Heart Disease  -- CHF due to Hypertensive Heart Disease and CAD  -- CHF not due to Hypertension but due to CAD  -- Other - I will add my own diagnosis  -- Disagree - Not applicable / Not valid  -- Disagree - Clinically unable to determine / Unknown  -- Refer to Clinical Documentation Reviewer    PROVIDER RESPONSE TEXT:    This patient has CHF due to hypertensive heart disease.    Query created by: Brendan Rodriguez on 2025 4:58 PM      QUERY TEXT:    Patient admitted with SOB . Noted documentation of Chronic Respiratory   failure. In order to support the diagnosis of Chronic respiratory failure    please include additional clinical indicators in your documentation.  Or   please document if the diagnosis of CRF has been ruled out after further   study.    The medical record reflects the following:  Risk Factors: ESRD, AoC CHF, Obesity  Clinical Indicators: ED: Chronic respiratory failure with hypoxia, H&P and   PNs:  Chronic respiratory failure on 3 L oxygen at night  Treatment: 3 LNC at night, 2 LNC prn during the day. No VGS  Options provided:  -- chronic respiratory failure was 
4 Eyes Skin Assessment     NAME:  Yovanny Levine  YOB: 1975  MEDICAL RECORD NUMBER:  [id number]    The patient is being assessed for  Admission    I agree that at least one RN has performed a thorough Head to Toe Skin Assessment on the patient. ALL assessment sites listed below have been assessed.      Areas assessed by both nurses:    Head, Face, Ears, Shoulders, Back, Chest, Arms, Elbows, Hands, Sacrum. Buttock, Coccyx, Ischium, and Legs. Feet and Heels        Does the Patient have a Wound? Yes wound(s) were present on assessment. LDA wound assessment was Initiated and completed by RN       Pradeep Prevention initiated by RN: No  Wound Care Orders initiated by RN: Yes    Pressure Injury (Stage 3,4, Unstageable, DTI, NWPT, and Complex wounds) if present, place Wound referral order by RN under : No    New Ostomies, if present place, Ostomy referral order under : No     Nurse 1 eSignature: Electronically signed by Delfin Tyson RN on 2/18/25 at 9:03 AM EST    **SHARE this note so that the co-signing nurse can place an eSignature**    Nurse 2 eSignature: Electronically signed by Shaylee Burden RN on 2/18/25 at 10:51 AM EST    
Accessed pt chart for wound prevalence study   
HD ORDERED  
Infectious Disease Progress Note  2025   Patient Name: Yovanny Levine : 1975     Assessment  Right diabetic foot ulcer  Admitted for chest pain, shortness of breath, runny nose, nausea, vomiting and diarrhea but he was noted to have a right foot plantar wound.  And had recently been seen at the wound care clinic when I&D was done on 2025 and cultures were positive for MRSA, Enterococcus faecalis and Staphylococcus caprae  Right foot x-ray done on 2025 showed an area density at the plantar aspect of the midfoot and could not rule out possible foreign body.   -MRI right foot wo contrast: Negative study due to motion. No definite evidence of OM. Bone scan could be obtained would be less sensitive due to motion. Soft tissue swelling and edema with myositis. Allowing for limitations, no definite abscess.   Charcot arthropathy  ESRD on HD  Doxycycline allergy  Combined systolic and diastolic heart failure  Multiple comorbidity per PMHx: Coronary artery disease, hypertension, hyperlipidemia, anxiety and depression  Plan  Therapeutic:  DC intravenous vancomycin, based on prior cultures from  of MRSA, E.faecalis and Staphylococcus caprae.   Start po linezolid 600 mg bid for 14 more days (end date 3/9/2025).   Diagnostic:  MRI of the right foot-no definite evidence of OM due to motion artifact.  F/u:  Right foot wound culture was ordered on 2025 but not received by the microbiology laboratory, wound now dry, unable to obtain cultures  Other: Follow up with wound care after DC  OK from ID standpoint to DC when medically ready    Reason for visit: F/u for right diabetic foot ulcer   History:?Interval history noted  Denies n/v/d/f or untoward effects of antimicrobials  Physical Exam:  Vital Signs: BP (!) 122/90   Pulse 72   Temp 97.3 °F (36.3 °C) (Oral)   Resp 16   Ht 1.753 m (5' 9\")   Wt 120.8 kg (266 lb 5.1 oz)   SpO2 91%   BMI 39.33 kg/m²     Gen: remains A&O  Skin: no stigmata of 
Infectious Disease Progress Note  2025   Patient Name: Yovanny Levine : 1975     Assessment  Right diabetic foot ulcer  admitted for chest pain, shortness of breath, runny nose, nausea, vomiting and diarrhea but he was noted to have a right foot plantar wound.  And had recently been seen at the wound care clinic when I&D was done on 2025 and cultures were positive for MRSA, Enterococcus faecalis and Staphylococcus caprae  Right foot x-ray done on 2025 showed an area density at the plantar aspect of the midfoot and could not rule out possible foreign body.  MRI of the right foot is pending.  Charcot arthropathy  ESRD on HD  Doxycycline allergy  Combined systolic and diastolic heart failure  Multiple comorbidity per PMHx: Coronary artery disease, hypertension, hyperlipidemia, anxiety and depression  Plan  Therapeutic:  Continue intravenous vancomycin  Diagnostic:  MRI of the right foot  F/u:  Right foot wound culture was ordered on 2025 but not received by the microbiology laboratory  Other:     Reason for visit: F/u for right diabetic foot ulcer   History:?Interval history noted  Denies n/v/d/f or untoward effects of antimicrobials  Physical Exam:  Vital Signs: BP (!) 151/80   Pulse 67   Temp 98.1 °F (36.7 °C)   Resp 22   Ht 1.753 m (5' 9\")   Wt 119.9 kg (264 lb 5.3 oz)   SpO2 100%   BMI 39.03 kg/m²     Gen: alert and oriented X3, no distress  Skin: no stigmata of endocarditis  Wounds: right foot wound dressing  C/D/I  HEMT: AT/NC Oropharynx pink, moist, and without lesions or exudates;  Eyes: PERRLA, EOMI, conjunctiva pink, sclera anicteric.   Neck: Supple. Trachea midline. No LAD.  Chest: no distress and CTA. Good air movement.  Heart: RRR and no MRG.   Abd: soft, non-distended, no tenderness, no hepatomegaly. Normoactive bowel sounds.  Ext: no clubbing, cyanosis, or edema  Catheter Site: without erythema or tenderness  LDA:   Neuro: Mental status intact. CN 2-12 intact and no 
Nephrology Progress Note        [address] [facility], [address]  [place] [address]  Phone: [phone]  Office Hours: 8:30AM - 4:30PM  Monday - Friday 2/22/2025 6:47 AM  Subjective:   Admit Date: 2/17/2025  PCP: José Luis Izquierdo MD  Interval History:   Sitting in a chair due to dyspnea  Legs were moving in the MRI machine yesterday so he could not have the MRI    Diet: ADULT DIET; Regular; Low Sodium (2 gm); Low Potassium (Less than 3000 mg/day); 1200 ml      Data:   Scheduled Meds:   vancomycin (VANCOCIN) intermittent dosing (placeholder)   Other RX Placeholder    isosorbide mononitrate  60 mg Oral Daily    sodium chloride flush  5-40 mL IntraVENous 2 times per day    insulin glargine  8 Units SubCUTAneous Nightly    insulin lispro  0-4 Units SubCUTAneous 4x Daily AC & HS    aspirin  81 mg Oral Daily    atorvastatin  40 mg Oral Nightly    clopidogrel  75 mg Oral Daily    empagliflozin  10 mg Oral Daily    hydrALAZINE  25 mg Oral BID    metoprolol succinate  25 mg Oral Daily    pantoprazole  40 mg Oral Daily    rOPINIRole  0.25 mg Oral Nightly    heparin (porcine)  5,000 Units SubCUTAneous 3 times per day     Continuous Infusions:   dextrose      sodium chloride      dextrose       PRN Meds:LORazepam, hydrALAZINE, labetalol, diphenhydrAMINE, glucose, dextrose bolus **OR** dextrose bolus, glucagon (rDNA), dextrose, sodium chloride flush, sodium chloride, ondansetron **OR** ondansetron, polyethylene glycol, acetaminophen **OR** acetaminophen, potassium chloride **OR** potassium alternative oral replacement **OR** potassium chloride, magnesium sulfate, glucose, dextrose bolus **OR** dextrose bolus, glucagon (rDNA), dextrose, albuterol sulfate HFA, oxyCODONE-acetaminophen  I/O last 3 completed shifts:  In: 3860.7 [P.O.:600; I.V.:2010; IV Piggyback:250.7]  Out: 7005   I/O this shift:  In: 1320 [P.O.:1320]  Out: -     Intake/Output Summary (Last 24 hours) at 2/22/2025 0647  Last data filed at 2/22/2025 
Nephrology Progress Note        [address] [facility], [address] [place] [address]  Phone: [phone]  Office Hours: 8:30AM - 4:30PM  Monday - Friday 2/20/2025 6:53 AM  Subjective:   Admit Date: 2/17/2025  PCP: José Luis Izquierdo MD  Interval History:   On NC  Doing ok    Diet: ADULT DIET; Regular; Low Sodium (2 gm); 1200 ml      Data:   Scheduled Meds:   heparin (porcine)  5,000 Units IntraCATHeter 3 times per day    vancomycin (VANCOCIN) intermittent dosing (placeholder)   Other RX Placeholder    isosorbide mononitrate  60 mg Oral Daily    sodium chloride flush  5-40 mL IntraVENous 2 times per day    insulin glargine  8 Units SubCUTAneous Nightly    insulin lispro  0-4 Units SubCUTAneous 4x Daily AC & HS    aspirin  81 mg Oral Daily    atorvastatin  40 mg Oral Nightly    calcitRIOL  0.5 mcg Oral BID    calcium carbonate  2 tablet Oral BID    clopidogrel  75 mg Oral Daily    empagliflozin  10 mg Oral Daily    hydrALAZINE  25 mg Oral BID    metoprolol succinate  25 mg Oral Daily    pantoprazole  40 mg Oral Daily    rOPINIRole  0.25 mg Oral Nightly    heparin (porcine)  5,000 Units SubCUTAneous 3 times per day     Continuous Infusions:   dextrose      sodium chloride      dextrose       PRN Meds:hydrALAZINE, labetalol, diphenhydrAMINE, glucose, dextrose bolus **OR** dextrose bolus, glucagon (rDNA), dextrose, sodium chloride flush, sodium chloride, ondansetron **OR** ondansetron, polyethylene glycol, acetaminophen **OR** acetaminophen, potassium chloride **OR** potassium alternative oral replacement **OR** potassium chloride, magnesium sulfate, glucose, dextrose bolus **OR** dextrose bolus, glucagon (rDNA), dextrose, albuterol sulfate HFA, oxyCODONE-acetaminophen  I/O last 3 completed shifts:  In: 860 [P.O.:350; I.V.:10]  Out: 4000   No intake/output data recorded.    Intake/Output Summary (Last 24 hours) at 2/20/2025 0653  Last data filed at 2/19/2025 1500  Gross per 24 hour   Intake 860 ml 
Nephrology Progress Note        [address] [facility], [address] [place] [address]  Phone: [phone]  Office Hours: 8:30AM - 4:30PM  Monday - Friday 2/19/2025 7:28 AM  Subjective:   Admit Date: 2/17/2025  PCP: José Luis Izquierdo MD  Interval History:   On NC  Reports dyspnea on exertion    Diet: Diet NPO      Data:   Scheduled Meds:   heparin (porcine)  2,500 Units IntraVENous Once in dialysis    vancomycin (VANCOCIN) intermittent dosing (placeholder)   Other RX Placeholder    isosorbide mononitrate  60 mg Oral Daily    sodium chloride flush  5-40 mL IntraVENous 2 times per day    insulin glargine  8 Units SubCUTAneous Nightly    insulin lispro  0-4 Units SubCUTAneous 4x Daily AC & HS    aspirin  81 mg Oral Daily    atorvastatin  40 mg Oral Nightly    calcitRIOL  0.5 mcg Oral BID    calcium carbonate  2 tablet Oral BID    clopidogrel  75 mg Oral Daily    empagliflozin  10 mg Oral Daily    hydrALAZINE  25 mg Oral BID    metoprolol succinate  25 mg Oral Daily    pantoprazole  40 mg Oral Daily    rOPINIRole  0.25 mg Oral Nightly    heparin (porcine)  5,000 Units SubCUTAneous 3 times per day     Continuous Infusions:   dextrose      sodium chloride      dextrose       PRN Meds:hydrALAZINE, labetalol, diphenhydrAMINE, glucose, dextrose bolus **OR** dextrose bolus, glucagon (rDNA), dextrose, sodium chloride flush, sodium chloride, ondansetron **OR** ondansetron, polyethylene glycol, acetaminophen **OR** acetaminophen, potassium chloride **OR** potassium alternative oral replacement **OR** potassium chloride, magnesium sulfate, glucose, dextrose bolus **OR** dextrose bolus, glucagon (rDNA), dextrose, albuterol sulfate HFA, oxyCODONE-acetaminophen  No intake/output data recorded.  No intake/output data recorded.  No intake or output data in the 24 hours ending 02/19/25 0728    CBC:   Recent Labs     02/17/25  1143   WBC 7.1   HGB 9.7*          BMP:    Recent Labs     02/17/25  1143 02/17/25  1301 
Nephrology Progress Note        [address] [facility], [address] [place] [address]  Phone: [phone]  Office Hours: 8:30AM - 4:30PM  Monday - Friday 2/21/2025 7:23 AM  Subjective:   Admit Date: 2/17/2025  PCP: José Luis Izquierdo MD  Interval History:   On NC  Reports dyspnea last night    Diet: ADULT DIET; Regular; Low Sodium (2 gm); Low Potassium (Less than 3000 mg/day); 1200 ml      Data:   Scheduled Meds:   heparin (porcine)  2,000 Units IntraVENous Once in dialysis    vancomycin (VANCOCIN) intermittent dosing (placeholder)   Other RX Placeholder    isosorbide mononitrate  60 mg Oral Daily    sodium chloride flush  5-40 mL IntraVENous 2 times per day    insulin glargine  8 Units SubCUTAneous Nightly    insulin lispro  0-4 Units SubCUTAneous 4x Daily AC & HS    aspirin  81 mg Oral Daily    atorvastatin  40 mg Oral Nightly    clopidogrel  75 mg Oral Daily    empagliflozin  10 mg Oral Daily    hydrALAZINE  25 mg Oral BID    metoprolol succinate  25 mg Oral Daily    pantoprazole  40 mg Oral Daily    rOPINIRole  0.25 mg Oral Nightly    heparin (porcine)  5,000 Units SubCUTAneous 3 times per day     Continuous Infusions:   dextrose      sodium chloride      dextrose       PRN Meds:hydrALAZINE, labetalol, diphenhydrAMINE, glucose, dextrose bolus **OR** dextrose bolus, glucagon (rDNA), dextrose, sodium chloride flush, sodium chloride, ondansetron **OR** ondansetron, polyethylene glycol, acetaminophen **OR** acetaminophen, potassium chloride **OR** potassium alternative oral replacement **OR** potassium chloride, magnesium sulfate, glucose, dextrose bolus **OR** dextrose bolus, glucagon (rDNA), dextrose, albuterol sulfate HFA, oxyCODONE-acetaminophen  I/O last 3 completed shifts:  In: 1360.7 [P.O.:600; I.V.:10; IV Piggyback:250.7]  Out: 3505   No intake/output data recorded.    Intake/Output Summary (Last 24 hours) at 2/21/2025 0723  Last data filed at 2/21/2025 0620  Gross per 24 hour   Intake 1360.68 
Nephrology Progress Note        [address] [facility], [address] [place] [address]  Phone: [phone]  Office Hours: 8:30AM - 4:30PM  Monday - Friday 2/24/2025 7:13 AM  Subjective:   Admit Date: 2/17/2025  PCP: José Luis Izquierdo MD  Interval History:   Slept in the chair  On NC    Diet: ADULT DIET; Regular; Low Sodium (2 gm); Low Potassium (Less than 3000 mg/day); 1200 ml      Data:   Scheduled Meds:   heparin (porcine)  2,000 Units IntraVENous Once in dialysis    hydrALAZINE  50 mg Oral 3 times per day    insulin glargine  5 Units SubCUTAneous Nightly    vancomycin (VANCOCIN) intermittent dosing (placeholder)   Other RX Placeholder    isosorbide mononitrate  60 mg Oral Daily    sodium chloride flush  5-40 mL IntraVENous 2 times per day    insulin lispro  0-4 Units SubCUTAneous 4x Daily AC & HS    aspirin  81 mg Oral Daily    atorvastatin  40 mg Oral Nightly    clopidogrel  75 mg Oral Daily    empagliflozin  10 mg Oral Daily    metoprolol succinate  25 mg Oral Daily    pantoprazole  40 mg Oral Daily    rOPINIRole  0.25 mg Oral Nightly    heparin (porcine)  5,000 Units SubCUTAneous 3 times per day     Continuous Infusions:   dextrose      sodium chloride      dextrose       PRN Meds:melatonin, hydrALAZINE, labetalol, diphenhydrAMINE, glucose, dextrose bolus **OR** dextrose bolus, glucagon (rDNA), dextrose, sodium chloride flush, sodium chloride, ondansetron **OR** ondansetron, polyethylene glycol, acetaminophen **OR** acetaminophen, potassium chloride **OR** potassium alternative oral replacement **OR** potassium chloride, magnesium sulfate, glucose, dextrose bolus **OR** dextrose bolus, glucagon (rDNA), dextrose, albuterol sulfate HFA, oxyCODONE-acetaminophen  I/O last 3 completed shifts:  In: 190 [P.O.:190]  Out: -   No intake/output data recorded.    Intake/Output Summary (Last 24 hours) at 2/24/2025 0787  Last data filed at 2/24/2025 0647  Gross per 24 hour   Intake 190 ml   Output --   Net 
Nephrology Progress Note        [address] [facility], [address] [place] [address]  Phone: [phone]  Office Hours: 8:30AM - 4:30PM  Monday - Friday 2/23/2025 7:08 AM  Subjective:   Admit Date: 2/17/2025  PCP: José Luis Izquierdo MD  Interval History:   Doing ok      Diet: ADULT DIET; Regular; Low Sodium (2 gm); Low Potassium (Less than 3000 mg/day); 1200 ml      Data:   Scheduled Meds:   hydrALAZINE  50 mg Oral 3 times per day    vancomycin (VANCOCIN) intermittent dosing (placeholder)   Other RX Placeholder    isosorbide mononitrate  60 mg Oral Daily    sodium chloride flush  5-40 mL IntraVENous 2 times per day    insulin glargine  8 Units SubCUTAneous Nightly    insulin lispro  0-4 Units SubCUTAneous 4x Daily AC & HS    aspirin  81 mg Oral Daily    atorvastatin  40 mg Oral Nightly    clopidogrel  75 mg Oral Daily    empagliflozin  10 mg Oral Daily    metoprolol succinate  25 mg Oral Daily    pantoprazole  40 mg Oral Daily    rOPINIRole  0.25 mg Oral Nightly    heparin (porcine)  5,000 Units SubCUTAneous 3 times per day     Continuous Infusions:   dextrose      sodium chloride      dextrose       PRN Meds:hydrALAZINE, labetalol, diphenhydrAMINE, glucose, dextrose bolus **OR** dextrose bolus, glucagon (rDNA), dextrose, sodium chloride flush, sodium chloride, ondansetron **OR** ondansetron, polyethylene glycol, acetaminophen **OR** acetaminophen, potassium chloride **OR** potassium alternative oral replacement **OR** potassium chloride, magnesium sulfate, glucose, dextrose bolus **OR** dextrose bolus, glucagon (rDNA), dextrose, albuterol sulfate HFA, oxyCODONE-acetaminophen  I/O last 3 completed shifts:  In: 4040 [P.O.:1540; I.V.:2000]  Out: 3500   No intake/output data recorded.    Intake/Output Summary (Last 24 hours) at 2/23/2025 0708  Last data filed at 2/22/2025 1239  Gross per 24 hour   Intake 2720 ml   Output 3500 ml   Net -780 ml       CBC: No results for input(s): \"WBC\", \"HGB\", \"PLT\" in 
No plans for heart catheterization today.    Patient may have diet.    Full note to follow.    Jon Escudero PA-C 02/19/25 11:34 AM    
Outpatient Pharmacy Progress Note for Meds-to-Beds    Total number of Prescriptions Filled: 1    Additional Documentation:  Medication(s) were delivered to the patient's room prior to discharge      Thank you for letting us serve your patients.  [address] [address]    Phone: [phone]    Fax: [phone]        
PHARMACY VANCOMYCIN MONITORING SERVICE  Pharmacy consulted by Jo Ann Almodovar for monitoring and adjustment.    Indication for treatment: Vancomycin indication: Other: wound growing MRSA and E faecalis   Goal trough: Trough Goal: 15-20 mcg/mL  AUC/PAMELA: 400-600    Pertinent Laboratory Values:   Temp Readings from Last 3 Encounters:   02/19/25 97.6 °F (36.4 °C)   02/13/25 98.4 °F (36.9 °C) (Temporal)   02/06/25 97.8 °F (36.6 °C) (Temporal)     Recent Labs     02/17/25  1143   WBC 7.1     Recent Labs     02/17/25  1143 02/18/25  0435   BUN 84* 56*   CREATININE 9.0* 7.3*     Estimated Creatinine Clearance: 15 mL/min (A) (based on SCr of 7.3 mg/dL (H)).    Intake/Output Summary (Last 24 hours) at 2/19/2025 1226  Last data filed at 2/19/2025 1159  Gross per 24 hour   Intake 500 ml   Output 4000 ml   Net -3500 ml     Last Encounter Weight:  Wt Readings from Last 3 Encounters:   02/18/25 116.7 kg (257 lb 4.4 oz)   01/25/25 110.2 kg (242 lb 15.2 oz)   01/08/25 117.1 kg (258 lb 2.5 oz)       Pertinent Cultures:   Date    Source    Results  2/13   Wound    MRSA, E faecali    Vancomycin level:   TROUGH:  No results for input(s): \"VANCOTROUGH\" in the last 72 hours.  RANDOM:  No results for input(s): \"VANCORANDOM\" in the last 72 hours.    Assessment:  HPI: Pt is 49 yom with right plantar OM. Wound cx growing MRSA and E faecalis. ID consulted for vancomycin for 10days  SCr, BUN, and urine output: ESRD on HD MWF. Last HD 2/17.  Day(s) of therapy: 2 of 10 per ID  Vancomycin concentration: tbd    Plan:  Patient received vanco 2000mg IV x 1 yesterday. iHD today. Plan for post HD level tomorrow AM to ensure therapeutic post-HD level.  Pharmacy will continue to monitor patient and adjust therapy as indicated    VANCOMYCIN CONCENTRATION SCHEDULED FOR 2/20 @0600    Thank you for the consult.  Mariana Mcgowan RPH  2/19/2025 12:26 PM   
PHARMACY VANCOMYCIN MONITORING SERVICE  Pharmacy consulted by Lalitha Almodovar NP for monitoring and adjustment.    Indication for treatment: SSTI with concern for OM  Goal trough: Trough Goal: 15-20 mcg/mL  AUC/PAMELA: 400-600    Risk Factors for MRSA Identified:   Documented isolation of MRSA within 1 year , Hospitalization within the past 90 days, Received IV antibiotics within the past 90 days    Pertinent Laboratory Values:   Temp Readings from Last 3 Encounters:   02/20/25 97.9 °F (36.6 °C)   02/13/25 98.4 °F (36.9 °C) (Temporal)   02/06/25 97.8 °F (36.6 °C) (Temporal)     Recent Labs     02/18/25  1020 02/19/25  1139 02/20/25  0900   CRP 20.3* 27.8* 22.9*     Recent Labs     02/18/25  0435 02/19/25  1139 02/20/25  0900   BUN 56* 31* 57*   CREATININE 7.3* 4.3* 7.0*     Estimated Creatinine Clearance: 16 mL/min (A) (based on SCr of 7 mg/dL (H)).    Intake/Output Summary (Last 24 hours) at 2/20/2025 1303  Last data filed at 2/20/2025 1153  Gross per 24 hour   Intake 860 ml   Output 3505 ml   Net -2645 ml     Last Encounter Weight:  Wt Readings from Last 3 Encounters:   02/18/25 116.7 kg (257 lb 4.4 oz)   01/25/25 110.2 kg (242 lb 15.2 oz)   01/08/25 117.1 kg (258 lb 2.5 oz)     Ideal body weight: 70.7 kg (155 lb 13.8 oz)  Adjusted ideal body weight: 89.1 kg (196 lb 6.9 oz)     Pertinent Cultures:   Date    Source    Results  02/13   Surgical culture (foot)  MRSA, E faecalis, Staph caprae  02/18   Wound culture (foot)  Sent     Vancomycin level:   Recent Labs     02/20/25  0900   VANCORANDOM 12.0       Assessment:  HPI: The patient is a 49 y.o. male who presented with diabetic foot infection with concerns for osteomyelitis. Foot wound has history of MRSA as outlined above. PMHx significant for HFrEF, CAD, RLS, T2DM, HTN, respiratory failure on 3 L O2 nightly, ESRD.  Scr, BUN, and urine output:   ESRD on iHD MWF. Currently scheduled T/T/S  UoP none documented  Day(s) of therapy: 3 or 10 per ID  Vancomycin 
PHARMACY VANCOMYCIN MONITORING SERVICE  Pharmacy consulted by Lalitha Almodovar NP for monitoring and adjustment.    Indication for treatment: SSTI with concern for OM  Goal trough: Trough Goal: 15-20 mcg/mL  AUC/PAMELA: 400-600    Risk Factors for MRSA Identified:   Documented isolation of MRSA within 1 year , Hospitalization within the past 90 days, Received IV antibiotics within the past 90 days    Pertinent Laboratory Values:   Temp Readings from Last 3 Encounters:   02/21/25 98.1 °F (36.7 °C)   02/13/25 98.4 °F (36.9 °C) (Temporal)   02/06/25 97.8 °F (36.6 °C) (Temporal)     Recent Labs     02/18/25  1020 02/19/25  1139 02/20/25  0900   CRP 20.3* 27.8* 22.9*     Recent Labs     02/19/25  1139 02/20/25  0900   BUN 31* 57*   CREATININE 4.3* 7.0*     Estimated Creatinine Clearance: 16 mL/min (A) (based on SCr of 7 mg/dL (H)).    Intake/Output Summary (Last 24 hours) at 2/21/2025 0904  Last data filed at 2/21/2025 0620  Gross per 24 hour   Intake 1360.68 ml   Output 3505 ml   Net -2144.32 ml     Last Encounter Weight:  Wt Readings from Last 3 Encounters:   02/21/25 119.9 kg (264 lb 5.3 oz)   01/25/25 110.2 kg (242 lb 15.2 oz)   01/08/25 117.1 kg (258 lb 2.5 oz)     Ideal body weight: 70.7 kg (155 lb 13.8 oz)  Adjusted ideal body weight: 90.4 kg (199 lb 4 oz)     Pertinent Cultures:   Date    Source    Results  02/13   Surgical culture (foot)  MRSA, E faecalis, Staph caprae  02/18   Wound culture (foot)  Sent     Vancomycin level:   Recent Labs     02/20/25  0900   VANCORANDOM 12.0       Assessment:  HPI: The patient is a 49 y.o. male who presented with diabetic foot infection with concerns for osteomyelitis. Foot wound has history of MRSA as outlined above. PMHx significant for HFrEF, CAD, RLS, T2DM, HTN, respiratory failure on 3 L O2 nightly, ESRD.  Scr, BUN, and urine output:   ESRD on iHD MWF. Currently scheduled T/T/S  UoP none documented  Day(s) of therapy: 4 or 10 per ID  Vancomycin concentration:   02/20 - 12.0 
PHARMACY VANCOMYCIN MONITORING SERVICE  Pharmacy consulted by Lalitha Almodovar NP for monitoring and adjustment.    Indication for treatment: SSTI with concern for OM  Goal trough: Trough Goal: 15-20 mcg/mL  AUC/PAMELA: 400-600    Risk Factors for MRSA Identified:   Documented isolation of MRSA within 1 year , Hospitalization within the past 90 days, Received IV antibiotics within the past 90 days    Pertinent Laboratory Values:   Temp Readings from Last 3 Encounters:   02/22/25 96.8 °F (36 °C)   02/13/25 98.4 °F (36.9 °C) (Temporal)   02/06/25 97.8 °F (36.6 °C) (Temporal)     Recent Labs     02/20/25  0900   CRP 22.9*     Recent Labs     02/20/25  0900 02/21/25  1405 02/22/25  0853   BUN 57* 29* 55*   CREATININE 7.0* 4.4* 6.4*     Estimated Creatinine Clearance: 18 mL/min (A) (based on SCr of 6.4 mg/dL (H)).    Intake/Output Summary (Last 24 hours) at 2/22/2025 1443  Last data filed at 2/22/2025 1239  Gross per 24 hour   Intake 4040 ml   Output 3500 ml   Net 540 ml     Last Encounter Weight:  Wt Readings from Last 3 Encounters:   02/22/25 119.1 kg (262 lb 9.1 oz)   01/25/25 110.2 kg (242 lb 15.2 oz)   01/08/25 117.1 kg (258 lb 2.5 oz)     Ideal body weight: 70.7 kg (155 lb 13.8 oz)  Adjusted ideal body weight: 90.1 kg (198 lb 8.7 oz)     Pertinent Cultures:   Date    Source    Results  02/13   Surgical culture (foot)  MRSA, E faecalis, Staph caprae  02/18   Wound culture (foot)  Sent     Vancomycin level:   Recent Labs     02/22/25  0853   VANCORANDOM 17.0       Assessment:  HPI: The patient is a 49 y.o. male who presented with diabetic foot infection with concerns for osteomyelitis. Foot wound has history of MRSA as outlined above. PMHx significant for HFrEF, CAD, RLS, T2DM, HTN, respiratory failure on 3 L O2 nightly, ESRD.  Scr, BUN, and urine output:   ESRD on iHD MWF. Currently scheduled T/T/S  UoP none documented  Day(s) of therapy: 5 or 10 per ID  Vancomycin concentration:   02/20 - 12.0 mg/L, pre-HD 
PHARMACY VANCOMYCIN MONITORING SERVICE  Pharmacy consulted by Lalitha Almodovar NP for monitoring and adjustment.    Indication for treatment: SSTI with concern for OM  Goal trough: Trough Goal: 15-20 mcg/mL  AUC/PAMELA: 400-600    Risk Factors for MRSA Identified:   Documented isolation of MRSA within 1 year , Hospitalization within the past 90 days, Received IV antibiotics within the past 90 days    Pertinent Laboratory Values:   Temp Readings from Last 3 Encounters:   02/23/25 97.5 °F (36.4 °C) (Oral)   02/13/25 98.4 °F (36.9 °C) (Temporal)   02/06/25 97.8 °F (36.6 °C) (Temporal)     No results for input(s): \"WBC\", \"PROCAL\", \"LACTATE\", \"CRP\" in the last 72 hours.    Recent Labs     02/21/25  1405 02/22/25  0853 02/23/25  0837   BUN 29* 55* 53*   CREATININE 4.4* 6.4* 6.0*     Estimated Creatinine Clearance: 19 mL/min (A) (based on SCr of 6 mg/dL (H)).  No intake or output data in the 24 hours ending 02/23/25 1626    Last Encounter Weight:  Wt Readings from Last 3 Encounters:   02/22/25 119.1 kg (262 lb 9.1 oz)   01/25/25 110.2 kg (242 lb 15.2 oz)   01/08/25 117.1 kg (258 lb 2.5 oz)     Ideal body weight: 70.7 kg (155 lb 13.8 oz)  Adjusted ideal body weight: 90.1 kg (198 lb 8.7 oz)     Pertinent Cultures:   Date    Source    Results  02/13   Surgical culture (foot)  MRSA, E faecalis, Staph caprae  02/18   Wound culture (foot)  Sent     Vancomycin level:   Recent Labs     02/22/25  0853   VANCORANDOM 17.0       Assessment:  HPI: The patient is a 49 y.o. male who presented with diabetic foot infection with concerns for osteomyelitis. Foot wound has history of MRSA as outlined above. PMHx significant for HFrEF, CAD, RLS, T2DM, HTN, respiratory failure on 3 L O2 nightly, ESRD.  Scr, BUN, and urine output:   ESRD on iHD MWF. Per nephrology switch back to MWF next session 2/24 AM   UoP none documented  Day(s) of therapy: 6 or 10 per ID  Vancomycin concentration:   02/20 - 12.0 mg/L, pre-HD level  2/22- 17 mg/L,~41 hour 
Patient Name: Yovanny Levine  Patient : 1975  MRN: [id number]     Acct: [id number]  Date of Admission: 2025  Room/Bed: 2128/2128-A  Code Status:  Full Code  Allergies:   Allergies   Allergen Reactions    Adhesive Tape Rash    Doxycycline Nausea And Vomiting    Reglan [Metoclopramide] Anxiety     Diagnosis:    Patient Active Problem List   Diagnosis    Hiatal hernia    Diabetes mellitus type 2, improved controlled    Diabetic neuropathy (HCC)    Panic attack    Anxiety associated with depression    Neck pain    DANIELA (obstructive sleep apnea)    Urinary frequency    Bilateral carpal tunnel syndrome- left worse than right    Hyperlipidemia with target LDL less than 100    Essential hypertension    Bronchitis    Osteomyelitis (HCC)    Abscess    Periumbilical hernia    Sepsis (HCC)    Cellulitis of left foot    Constipation    Intractable nausea and vomiting    Uncontrolled type 2 diabetes mellitus    Nausea and vomiting    Type 2 diabetes mellitus with right diabetic foot infection (HCC)    Acute renal failure (ARF)    Cellulitis    Cellulitis of left arm    Cellulitis, scrotum    Acute epididymitis    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HCC)    Recurrent major depressive disorder, in full remission    Generalized anxiety disorder    Necrotizing fasciitis (HCC)    Syncope    Right groin wound    Nephrotic syndrome    Generalized edema    Stage 3b chronic kidney disease (HCC)    Diabetic nephropathy associated with type 2 diabetes mellitus (HCC)    Hypoalbuminemia    Chronic kidney disease, stage IV (severe) ([facility])    FH: CAD (coronary artery disease)    ASCVD (arteriosclerotic cardiovascular disease)    Other proteinuria    Chest pain    Surgical wound dehiscence    CARMEN (acute kidney injury)    Anemia    Chest tightness    NSTEMI (non-ST elevated myocardial infarction) (HCC)    Diabetic foot ulcer with osteomyelitis (HCC)    ESRD (end stage renal disease) (HCC)    Problem with vascular access    Acute on chronic 
Pt arrived from dialysis stated that he feels dizzy upon arrive to the unit. Discharge order was placed while pt was in dialysis. Primary RN notified pt regarding discharge upon arrival from dialysis. Pt stated that he does not feel comfortable with going home due to him feeling light headed and with his son getting off at 2am. Pt stated that he would prefer to leave in the AM, so he could have someone with him at home. Hospitalitis on notified via call secure message regarding the pt's concerns. Hospitalist stated that pt is stable and that d/c will not be cancelled and to try call case management on call. Primary RN attempted 2x and no response.  Primary RN notified physician of the attempts as  well as reiterating pt's concerns regarding d/c   
Pt has uncontrollable muscle spasms we obtained 0 diagnostic images.  Pt returned, RN aware, will contact MD   
Spoke with RN concerning this patient's MRI Questionnaire, and RN will get MRI Q completed with the patient so we can try to schedule patient's MRI exam once the MRI Q is completed.   
Tolerated 3 hour dialysis treatment well.  Removed 3 liters with treatment.  Left AVF cannulated with 16 gauge dialysis needles without complication.  To MRI post treatment.  RN here to medicate prior to MRI        Patient Name: Yovanny Levine  Patient : 1975  MRN: [id number]     Acct: [id number]  Date of Admission: 2025  Room/Bed: [facility] [place]-A  Code Status:  Full Code  Allergies:   Allergies   Allergen Reactions    Adhesive Tape Rash    Doxycycline Nausea And Vomiting    Reglan [Metoclopramide] Anxiety     Diagnosis:    Patient Active Problem List   Diagnosis    Hiatal hernia    Diabetes mellitus type 2, improved controlled    Diabetic neuropathy (HCC)    Panic attack    Anxiety associated with depression    Neck pain    DANIELA (obstructive sleep apnea)    Urinary frequency    Bilateral carpal tunnel syndrome- left worse than right    Hyperlipidemia with target LDL less than 100    Essential hypertension    Bronchitis    Osteomyelitis (HCC)    Abscess    Periumbilical hernia    Sepsis (HCC)    Cellulitis of left foot    Constipation    Intractable nausea and vomiting    Uncontrolled type 2 diabetes mellitus    Nausea and vomiting    Type 2 diabetes mellitus with right diabetic foot infection (HCC)    Acute renal failure (ARF)    Cellulitis    Cellulitis of left arm    Cellulitis, scrotum    Acute epididymitis    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HCC)    Recurrent major depressive disorder, in full remission    Generalized anxiety disorder    Necrotizing fasciitis (HCC)    Syncope    Right groin wound    Nephrotic syndrome    Generalized edema    Stage 3b chronic kidney disease (HCC)    Diabetic nephropathy associated with type 2 diabetes mellitus (HCC)    Hypoalbuminemia    Chronic kidney disease, stage IV (severe) (HCC)    FH: CAD (coronary artery disease)    ASCVD (arteriosclerotic cardiovascular disease)    Other proteinuria    Chest pain    Surgical wound dehiscence    CARMEN (acute kidney injury)    Anemia    Chest 
Identify (I), Consent (C), Equipment (E), HepB Status (B), Orders Complete (O), Access Verified (A), and Timeliness (T)  Time out performed prior to access at 0845 hours.    Report Received from Primary RN at 0800 hours.  Primary RN (First Initial, Last Name, Title): juvenal rn   Incapacitated Nurse Education Completed: Yes     HBsAg ONLY:  Date Drawn: February 5, 2025       Results: Negative  HBsAb:  Date Drawn:  February 5, 2025       Results: Unknown    Order  Dialysis Bath  K+ (Potassium): 2  Ca+ (Calcium):  (3.5)  Na+ (Sodium): 138  HCO3 (Bicarb): 35  Bicarbonate Concentrate Lot No.: [id number]  Acid Concentrate Lot No.: [id number]     Na+ Modeling: Not Applicable  Dialyzer: F180  Dialysate Temperature (C):  36  Blood Flow Rate (BFR):  350   Dialysate Flow Rate (DFR):   700        Access to be Utilized   Access: AVF  Location: Upper Extremity  Side: Left   Needle gauge:  16  + Bruit/Thrill: Yes    First Use X-ray Verified: Not Applicable  OK to use line order: Not Applicable    Site Assessment:  Signs and Symptoms of Infection/Inflammation: None  If yes: Not Applicable    Non Dialysis Use?: No  Comment:    Flows: Good, Patent  If access problem, who was notified:     Pre and Post-Assessment  No data found.  Labs  No results for input(s): \"WBC\", \"HGB\", \"HCT\", \"PLT\" in the last 72 hours.                                                                 Recent Labs     02/18/25  0435 02/19/25  1139 02/20/25  0900    137 136   K 5.1 3.9 5.4*   CL 97* 96* 96*   CO2 27 27 24   BUN 56* 31* 57*   CREATININE 7.3* 4.3* 7.0*   GLUCOSE 142* 84 163*     IV Drips and Rate/Dose   dextrose      sodium chloride      dextrose        Safety - Before each treatment:   Dialysis Machine No.: [id number]   Machine Number: [id number]  Dialyzer Lot No.: [id number]  RO Machine Log Sheet Completed: Yes  Machine Alarm Self Test: Completed;Passed (02/20/25 3808)     Air Foam Detector: Tested, Proper Function, pH Reading  Extracorporeal Circuit 
hours.    Report Received from Primary RN at 0745 hours.  Primary RN (First Initial, Last Name, Title): DASHAWN Tyson RN  Incapacitated Nurse Education Completed: Yes     HBsAg ONLY:  Date Drawn: February 5, 2025       Results: Negative  HBsAb:  Date Drawn:  February 5, 2025       Results: Unknown    Order  Dialysis Bath  K+ (Potassium): 2  Ca+ (Calcium): 2.5  Na+ (Sodium): 138  HCO3 (Bicarb): 35  Bicarbonate Concentrate Lot No.: [id number]  Acid Concentrate Lot No.: [id number]     Na+ Modeling: Not Applicable  Dialyzer: F180  Dialysate Temperature (C):  36  Blood Flow Rate (BFR):  350   Dialysate Flow Rate (DFR):   700        Access to be Utilized   Access: AVG  Location: Upper Extremity  Side: Left   Needle gauge:  16  + Bruit/Thrill: Yes    First Use X-ray Verified: Yes  OK to use line order: Yes    Site Assessment:  Signs and Symptoms of Infection/Inflammation: None  If yes: Not Applicable  Dressing: Dry and Intact  Site Prep: Medical Aseptic Technique    Flows: Good, Patent  If access problem, who was notified:     Pre and Post-Assessment  Patient Vitals for the past 8 hrs:   Level of Consciousness Oriented X Heart Rhythm Respiratory Quality/Effort O2 Device Bilateral Breath Sounds Skin Color Skin Condition/Temp Abdomen Inspection Bowel Sounds (All Quadrants) Edema Edema Generalized RLE Edema LLE Edema Pain Level Response to Pain Intervention   02/21/25 0400 -- -- -- -- Nasal cannula -- -- -- -- -- -- -- -- -- -- --   02/21/25 0526 -- -- -- -- -- -- -- -- -- -- -- -- -- -- 0 Patient satisfied   02/21/25 0824 0 4 Regular Dyspnea with exertion Nasal cannula Diminished Ashen;John Dry;Warm Soft;Rounded Active Right lower extremity Non-pitting +2 +2 -- --   02/21/25 1132 -- -- -- -- Nasal cannula -- -- -- -- -- -- -- -- -- -- --   02/21/25 1135 0 4 Regular Dyspnea with exertion Nasal cannula Diminished -- Dry;Warm Soft;Rounded Active Right lower extremity Non-pitting +2 +2 -- --     Labs  No results for input(s): \"WBC\", 
hours.  Primary RN (First Initial, Last Name, Title): DONOVAN Butler RN  Incapacitated Nurse Education Completed: Yes     HBsAg ONLY:  Date Drawn: February 5, 2025       Results: Negative  HBsAb:  Date Drawn:  February 5, 2025       Results: Unknown    Order  Dialysis Bath  K+ (Potassium): 2  Ca+ (Calcium):  (3.5)  Na+ (Sodium): 138  HCO3 (Bicarb): 35  Bicarbonate Concentrate Lot No.: [id number]  Acid Concentrate Lot No.: [id number]     Na+ Modeling: Not Applicable  Dialyzer: F180  Dialysate Temperature (C):  36  Blood Flow Rate (BFR):  350   Dialysate Flow Rate (DFR):   700        Access to be Utilized   Access: AVF  Location: Upper Extremity  Side: Left   Needle gauge:  16  + Bruit/Thrill: Yes    First Use X-ray Verified: Not Applicable  OK to use line order: Not Applicable    Site Assessment:  Signs and Symptoms of Infection/Inflammation: None  If yes: Not Applicable  Dressing: Dry and Intact  Site Prep: Medical Aseptic Technique  Dressing Changed this Treatment: Yes  Comment:    Flows: Good, Patent  If access problem, who was notified:     Pre and Post-Assessment  Patient Vitals for the past 8 hrs:   Level of Consciousness Oriented X Heart Rhythm Respiratory Quality/Effort O2 Device Bilateral Breath Sounds Skin Condition/Temp Abdomen Inspection Bowel Sounds (All Quadrants) Edema Edema Generalized RLE Edema LLE Edema Pain Level   02/19/25 0714 -- -- -- -- -- -- -- -- -- -- -- -- -- 8   02/19/25 0810 0 4 Irregular Dyspnea with exertion Nasal cannula Diminished Dry;Warm Soft;Rounded Active Right lower extremity;Left lower extremity Non-pitting +2 +2 --   02/19/25 1215 0 4 Regular Dyspnea with exertion Nasal cannula Diminished Dry;Warm Soft;Rounded Active Right lower extremity;Left lower extremity Non-pitting +2 +2 --     Labs  Recent Labs     02/17/25  1143   WBC 7.1   HGB 9.7*   HCT 30.4*                                                                     Recent Labs     02/17/25  1143 02/17/25  1301 02/17/25  1909 
                            Recent Labs     02/17/25  1143 02/17/25  1301     --    K 6.0*  --    CL 93*  --    CO2 22  --    BUN 84*  --    CREATININE 9.0*  --    GLUCOSE 169* 152     IV Drips and Rate/Dose   dextrose      dextrose        Safety - Before each treatment:   Dialysis Machine No.: [id number]   Machine Number: [id number]  Dialyzer Lot No.: [id number]  RO Machine Log Sheet Completed: Yes  Machine Alarm Self Test: Passed;Completed (02/17/25 1352)     Air Foam Detector: Tested, Proper Function, pH Reading  Extracorporeal Circuit Tested for Integrity: Yes  Machine Conductivity: 13.8  Manual Conductivity: 13.8     Bicarbonate Concentrate Lot No.: [id number]  Acid Concentrate Lot No.: [id number]  Manual Ph: 7.4  Bleach Test (Neg): Yes  Bath Temperature: 95 °F (35 °C)  Tubing Lot#: [id number]  Conductivity Meter Serial #: [id number]  All Connections Secure?: Yes  Venous Parameters Set?: Yes  Arterial Parameters Set?: Yes  Saline Line Double Clamped?: Yes  Air Foam Detector Engaged?: Yes  Machine Functioning Alarm Free? Yes  Prime Given: 200ml    Chlorine Testing - Before each treatment and every 4 hours:   Treatment  Time On: 1427  Time Off: 1722     1st check: less than 0.1 ppm at: 1345hours  2nd check: less than 0.1 ppm at: 1540 hours  3rd check: Not Applicable  (if greater than 0.1 ppm, then check every 30 minutes from secondary)    Access Flows and Pressures  Patient Vitals for the past 8 hrs:   Blood Flow Rate (ml/min) Ultrafiltration Rate (ml/hr) Ultrafiltration Removed (ml) Arterial Pressure (mmHg) Venous Pressure (mmHg) TMP DFR Comments Access Visible   02/17/25 1427 350 ml/min 1140 ml/hr -- -50 mmHg 120 80 700 tx started Yes   02/17/25 1430 350 ml/min 1140 ml/hr 113 ml -40 mmHg 130 80 700 alert Yes   02/17/25 1445 350 ml/min 1140 ml/hr 386 ml -50 mmHg 130 80 700 no s/sx of distress Yes   02/17/25 1500 350 ml/min 1140 ml/hr 894 ml -60 mmHg 140 90 700 pt resting Yes   02/17/25 1515 350 ml/min 1140 ml/hr 955 ml 
Cellulitis    Cellulitis of left arm    Cellulitis, scrotum    Acute epididymitis    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HCC)    Recurrent major depressive disorder, in full remission    Generalized anxiety disorder    Necrotizing fasciitis (HCC)    Syncope    Right groin wound    Nephrotic syndrome    Generalized edema    Stage 3b chronic kidney disease (HCC)    Diabetic nephropathy associated with type 2 diabetes mellitus (HCC)    Hypoalbuminemia    Chronic kidney disease, stage IV (severe) (HCC)    FH: CAD (coronary artery disease)    ASCVD (arteriosclerotic cardiovascular disease)    Other proteinuria    Chest pain    Surgical wound dehiscence    CARMEN (acute kidney injury)    Anemia    Chest tightness    NSTEMI (non-ST elevated myocardial infarction) (HCC)    Diabetic foot ulcer with osteomyelitis (HCC)    ESRD (end stage renal disease) ([facility])    Problem with vascular access    Acute on chronic respiratory failure with hypoxia (HCC)    CAD (coronary artery disease)    Volume overload    Gangrene (HCC)    Acute osteomyelitis of metatarsal bone of left foot (HCC)    Open wound of plantar aspect of left foot    Open wound of foot, left, sequela    Chronic foot pain, left    Absence of toe of left foot    Hyperkalemia    ESRD (end stage renal disease) on dialysis (HCC)    Diabetic ulcer of midfoot Dave 3 associated with type 2 diabetes mellitus, with muscle involvement without evidence of necrosis (HCC)    Acute pain of left knee    Pain in left lower leg    Chronic refractory osteomyelitis of foot, left (HCC)    Acute hypoxic respiratory failure (HCC)    Diabetic ulcer of left midfoot Dave 3 associated with type 2 diabetes mellitus, with fat layer exposed (HCC)    Type 2 diabetes mellitus with skin complication, with long-term current use of insulin (HCC)    Acute on chronic respiratory failure (HCC)    Traumatic leg ulcer, right, with fat layer exposed (HCC)    Skin ulcer of scalp with fat layer exposed (HCC)    Diabetic ulcer 
APPLE Almodovar - CNP on 2/19/2025 at 11:50 AM  
vancomycin (VANCOCIN) intermittent dosing (placeholder)   Other RX Placeholder    isosorbide mononitrate  60 mg Oral Daily    sodium chloride flush  5-40 mL IntraVENous 2 times per day    insulin lispro  0-4 Units SubCUTAneous 4x Daily AC & HS    aspirin  81 mg Oral Daily    atorvastatin  40 mg Oral Nightly    clopidogrel  75 mg Oral Daily    empagliflozin  10 mg Oral Daily    metoprolol succinate  25 mg Oral Daily    pantoprazole  40 mg Oral Daily    rOPINIRole  0.25 mg Oral Nightly    heparin (porcine)  5,000 Units SubCUTAneous 3 times per day         Labs and Imaging Studies   Laboratory findings:  XR CHEST (2 VW)    Result Date: 2/17/2025  EXAM: XR CHEST (2 VW) ACCESSION: [id number] DATE OF SERVICE:  2/17/2025 13:46 ORDERING PROVIDER:STELLA STANLEY  REASON FOR STUDY: SOB PATIENT DEMOGRAPHICS: 49 years, Male COMPARISON: No existing relevant imaging study corresponding to the same anatomical region is available. TECHNIQUE: Frontal and lateral views of the chest were obtained. FINDINGS: Overlying EKG leads and wires obscure portions of the chest.  Cardiac silhouette  is mildly enlarged. Pulmonary vasculature is mildly prominent. Lung volumes are  satisfactory. Patchy infiltrate is seen in the right posterior basilar lung region. Upper lungs are clear. Small right pleural effusion. Left pleural space is clear.  Moderate degenerative changes to the thoracic spine are seen. IMPRESSION: 1. Right lower lobe infiltrate. This may represent an area of atelectasis. 2. Small right pleural effusion. 3. No evidence of pneumothorax. This dictation was created with voice recognition software.  While attempts have  been made to review the dictation as it is transcribed, on occasion the spoken word can be misinterpreted by the technology leading to omissions or inappropriate words, phrases or sentences.  Dictated and Electronically Signed By: Rahul Mtz DO 2/17/2025 19:05          Recent Results (from the past 24 
Value Ref Range    Glucose 111 mg/dL    QC OK? yes    POCT Glucose    Collection Time: 02/17/25  9:19 PM   Result Value Ref Range    POC Glucose 163 (H) 74 - 99 mg/dL   POCT Glucose    Collection Time: 02/17/25  9:21 PM   Result Value Ref Range    Glucose 163 mg/dL    QC OK? yes    Basic Metabolic Panel    Collection Time: 02/18/25  4:35 AM   Result Value Ref Range    Sodium 139 136 - 145 mmol/L    Potassium 5.1 3.5 - 5.1 mmol/L    Chloride 97 (L) 99 - 110 mmol/L    CO2 27 21 - 32 mmol/L    Anion Gap 15 9 - 17 mmol/L    Glucose 142 (H) 74 - 99 mg/dL    BUN 56 (H) 7 - 20 mg/dL    Creatinine 7.3 (H) 0.9 - 1.3 mg/dL    Est, Glom Filt Rate 8 (L) >60 mL/min/1.73m2    Calcium 8.6 8.3 - 10.6 mg/dL   Hepatic function panel    Collection Time: 02/18/25  4:35 AM   Result Value Ref Range    Albumin 3.6 3.4 - 5.0 g/dL    Alkaline Phosphatase 73 40 - 129 U/L    ALT 11 10 - 40 U/L    AST 17 15 - 37 U/L    Total Bilirubin 0.6 0.0 - 1.0 mg/dL    Bilirubin, Direct 0.3 0.0 - 0.3 mg/dL    Bilirubin, Indirect 0.3 0.0 - 0.7 mg/dL    Total Protein 6.8 6.4 - 8.2 g/dL    Albumin/Globulin Ratio 1.1 1.1 - 2.2   Magnesium    Collection Time: 02/18/25  4:35 AM   Result Value Ref Range    Magnesium 2.2 1.8 - 2.4 mg/dL   Lipid Panel    Collection Time: 02/18/25  4:35 AM   Result Value Ref Range    Cholesterol, Total 84 (L) 125 - 199 mg/dL    HDL 30 (L) >40 mg/dL    LDL Cholesterol 43 <101 mg/dL    Triglycerides 57 <150 mg/dL   POCT Glucose    Collection Time: 02/18/25  6:44 AM   Result Value Ref Range    POC Glucose 114 (H) 74 - 99 mg/dL   Troponin    Collection Time: 02/18/25 10:20 AM   Result Value Ref Range    Troponin, High Sensitivity 140 (HH) 0 - 22 ng/L   C-Reactive Protein    Collection Time: 02/18/25 10:20 AM   Result Value Ref Range    CRP 20.3 (H) 0.0 - 5.0 mg/L   POCT Glucose    Collection Time: 02/18/25 10:25 AM   Result Value Ref Range    POC Glucose 131 (H) 74 - 99 mg/dL           Electronically signed by Ashlie Brown MD on

## 2025-02-24 NOTE — CONSULTS
INPATIENT CARDIOLOGY CONSULT NOTE         Reason for consultation:  BUI    History of present illness:Yovnany is a 49 y.o.year old who is admitted with   Chief Complaint   Patient presents with    Shortness of Breath     Left arm numbness onset 4am         Patient is 49-year-old gentleman with prior medical history significant for coronary disease status post PCI to LAD and circumflex artery in December 2024, essential hypertension hyperlipidemia end-stage renal disease on dialysis, diabetes mellitus with polyneuropathy, Charcot heart right foot, status post left foot amputation, right plantar infection, anxiety, presents to the hospital with chief complaint of shortness of breath and chest pressure.  Cardiology consulted to evaluate patient for above.    Cardiac troponin 155, 160, 140  Trend has been unchanged and patient has had chronically elevated troponin in the same range.    EKG was sinus rhythm without any ischemic changes     Pertinent Lab Personally Review     Recent Labs     02/17/25  1143   WBC 7.1   HGB 9.7*   HCT 30.4*         Recent Labs     02/18/25  0435      K 5.1   CL 97*   CO2 27   BUN 56*   CREATININE 7.3*     Recent Labs     02/18/25  0435   AST 17   ALT 11   BILIDIR 0.3   BILITOT 0.6   ALKPHOS 73     Lab Results   Component Value Date    PROBNP >70,000 (H) 02/17/2025    PROBNP >70,000 (H) 01/20/2025    PROBNP >70,000 (H) 01/07/2025         Past medical history:    has a past medical history of Abscess, Acute renal failure (ARF) ([facility]), Anxiety associated with depression, Anxiety associated with depression, Back pain, CAD (coronary artery disease), Chest pain, Diabetic nephropathy ([facility]), Diabetic neuropathy ([facility]), Diabetic ulcer of left midfoot associated with type 2 diabetes mellitus, with fat layer exposed ([facility]), Diverticulosis, DM (diabetes mellitus), type 2 (HCC), 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 
Clinical Pharmacy Progress Note    Vancomycin has been discontinued by Lalitha Almodovar NP. Pharmacy will sign off. Please re-consult pharmacy if Vancomycin dosing is wanted in the future.    Please call with questions.    Rahul Cohn PharmD, [facility]   Main Pharmacy [id number]  2/24/2025 10:08 AM        
Consult completed. Procedure/rationale explained to pt & consent obtained. #22ga Nexiva extra-long, 1.75\" PIV initiated using UltraSound-guided technique without difficulty/complications. Pt tolerated well and no other c/o or needs noted or reported.     
Infectious Disease Consult Note  2025   Patient Name: Yovanny Levine : 1975   Impression  Right Plantar Infected DFU:  Charcot Arthropathy of Right Foot:  Allergy to doxycycline (N/V):  Concern for right plantar OM as the wound has been present for about 7 weeks and has continued to worsen. Will obtain right foot XR to evaluate for foreign body, OM and/or abscess.   CrCl 15 on HD. Afebrile. No leukocytosis. COVID 19, influenza A/B and respiratory viral panel negative.   -s/p I&D at the wound center- Right foot surgical culture: MRSA (PAMELA 1 to vancomycin), E.faecalis (pan sensi) and Staphylococcus caprae  Past: 2024 XR Foot Right: Radiopaque foreign body as described above. No acute fracture or dislocation. Deformity involving the base of the fifth metatarsal, please correlate clinically with prior injury versus infection. Postop changes as described above.  Past: 2025 XR Foot Right: No radiopaque foreign body  -2 VW CXR: 1. Right lower lobe infiltrate. This may represent an area of atelectasis.   2. Small right pleural effusion.   3. No evidence of pneumothorax.   Chronic Left Foot OM S/p Remote Left Foot Amputations-Hallux, 4th and 5th Toes:   Chest Pain/Combined Systolic and Diastolic HF/ CAD/ HTN/ HLD:  Cardiology has been consulted  ESRD on HD (MWF):  Dr. James onboard  DMII with Polyneuropathy:  Anxiety/ Depression:  Class III Obesity: BMI: 37.99 kg/m2  Multi-morbidity: per PMHx: ESRD on HD, charcot right foot, CAD, RLS, DMII with polyneuropathy, HF, HTN, anxiety/ depression, HLD, obesity  Plan:  DC po amoxicillin and Bactrim  Start IV vancomycin per pharmacy dosing for coverage of prior wound culture from 2025   Trend CRPs, ordered  Ordered right foot culture   Ordered xr right foot to evaluate for foreign body and/or abscess and/or OM   Thank you for allowing me to consult in the care of this patient.  ------------------------  REASON FOR CONSULT: Infective syndrome 
Nephrology Service Consultation      [address], [address]  [place] [address]  Phone: [phone]  Office Hours: 8:30AM - 4:30PM  Monday - Friday        MEDICAL DECISION MAKING and Recommendations   -Chest pain  -Acute on chronic hypoxic resp failure  -Hyperkalemia: K 6  -ESRD on HD MWF  -Anemia of ESRD  -CKD-MBD  -HTN  -CHF with EF 40-45%    Suggest:  -He had HD yesterday with 3L UF and starting developing muscle cramps, suggesting that he was already euvolemic and was getting volume depleted thus, I do not plan any HD today.  Other causes of chest pain and dyspnea should be considered  -Will follow  -Please place limb alert on AVF arm    Thank you      Patient Active Problem List    Diagnosis Date Noted    Volume overload 02/22/2023    Acute on chronic respiratory failure with hypoxia 02/10/2023    CAD (coronary artery disease) 02/10/2023    Problem with vascular access 11/26/2022    ESRD (end stage renal disease) ([facility]) 09/12/2022    Diabetic foot ulcer with osteomyelitis ([facility]) 09/01/2022    NSTEMI (non-ST elevated myocardial infarction) ([facility]) 08/24/2022    Chest tightness 08/22/2022    CARMEN (acute kidney injury) ([facility]) 07/25/2022    Anemia 07/25/2022    Recurrent major depressive disorder, in full remission ([facility]) 05/18/2021    Generalized anxiety disorder 05/18/2021    Heart failure ([facility]) 02/17/2025    Abscess of foot 02/13/2025    Cellulitis of right foot 02/13/2025    Pre-ulcerative calluses 02/06/2025    Recurrent right pleural effusion 01/24/2025    Fluid overload 01/20/2025    Ulcer of abdomen wall, limited to breakdown of skin ([facility]) 01/16/2025    Diabetic ulcer of left midfoot associated with type 2 diabetes mellitus, limited to breakdown of skin ([facility]) 01/16/2025    Hypervolemia associated with renal insufficiency 01/07/2025    Diabetic ulcer of right midfoot associated with type 2 diabetes mellitus, with fat layer exposed ([facility]) 12/30/2024    Long toenail 12/30/2024    Traumatic leg 
PHARMACY VANCOMYCIN MONITORING SERVICE  Pharmacy consulted by Jo Ann Almodovar for monitoring and adjustment.    Indication for treatment: Vancomycin indication: Other: wound growing MRSA and E faecalis   Goal trough: Trough Goal: 15-20 mcg/mL  AUC/PAMELA: 400-600    Pertinent Laboratory Values:   Temp Readings from Last 3 Encounters:   02/18/25 98.4 °F (36.9 °C) (Oral)   02/13/25 98.4 °F (36.9 °C) (Temporal)   02/06/25 97.8 °F (36.6 °C) (Temporal)     Recent Labs     02/17/25  1143   WBC 7.1     Recent Labs     02/17/25  1143 02/18/25  0435   BUN 84* 56*   CREATININE 9.0* 7.3*     Estimated Creatinine Clearance: 15 mL/min (A) (based on SCr of 7.3 mg/dL (H)).    Intake/Output Summary (Last 24 hours) at 2/18/2025 1420  Last data filed at 2/17/2025 1722  Gross per 24 hour   Intake 500 ml   Output 3300 ml   Net -2800 ml     Last Encounter Weight:  Wt Readings from Last 3 Encounters:   02/18/25 116.7 kg (257 lb 4.4 oz)   01/25/25 110.2 kg (242 lb 15.2 oz)   01/08/25 117.1 kg (258 lb 2.5 oz)       Pertinent Cultures:   Date    Source    Results  2/13   Wound    MRSA, E faecali    Vancomycin level:   TROUGH:  No results for input(s): \"VANCOTROUGH\" in the last 72 hours.  RANDOM:  No results for input(s): \"VANCORANDOM\" in the last 72 hours.    Assessment:  HPI: Pt is 49 yom with right plantar OM. Wound cx growing MRSA and E faecalis. ID consulted for vancomycin for 10days  SCr, BUN, and urine output: ESRD on HD MWF. Last HD 2/17.  Day(s) of therapy: 1  Vancomycin concentration: tbd    Plan:  Will give vanco 2000mg IV x 1. Pt to have HD tomorrow. Plan for post HD level Thursday AM to ensure therapeutic post-HD level.  Pharmacy will continue to monitor patient and adjust therapy as indicated    VANCOMYCIN CONCENTRATION SCHEDULED FOR 2/20 @0600    Thank you for the consult.  Mariana Mcgowan RPH  2/18/2025 2:20 PM   
HISTORY    Past Surgical History:   Procedure Laterality Date    ABDOMEN SURGERY      CARDIAC CATHETERIZATION  2003, 2013    Normal (Dr. Winslow)    CARDIAC PROCEDURE N/A 12/16/2024    Left heart cath / coronary angiography performed by Sherwin George MD at [facility] CARDIAC CATH LAB    CARDIAC PROCEDURE N/A 12/16/2024    Fractional flow reserve (FFR) performed by Sherwin George MD at [facility] CARDIAC CATH LAB    CARDIAC PROCEDURE N/A 12/16/2024    Insert stent gisela coronary performed by Sherwin George MD at [facility] CARDIAC CATH LAB    COLONOSCOPY  06/02/2011    Pandiverticulosis, Nonbleeding internal hemorrhoids, Repeat colonoscopy at age 50- Dr. Medina    CORONARY STENT PLACEMENT      x 3    FOOT DEBRIDEMENT Left 8/30/2023    FOOT DEBRIDEMENT INCISION AND DRAINAGE performed by Sammy Hsieh MD at [facility] OR    FOOT DEBRIDEMENT Left 4/23/2024    FOOT DEBRIDEMENT INCISION AND DRAINAGE performed by Claudy Forrest DO at [facility] OR    FOOT SURGERY Left 12/21/2021    EXCISION OF HEAD LEFT 2ND METATARSAL performed by Andrzej Webber DPM at [facility] OR    IR NONTUNNELED VASCULAR CATHETER > 5 YEARS  07/25/2022    IR NONTUNNELED VASCULAR CATHETER 7/25/2022 [facility] SPECIAL PROCEDURES    IR TUNNELED CVC PLACE WO SQ PORT/PUMP > 5 YEARS  07/27/2022    IR TUNNELED CATHETER PLACEMENT GREATER THAN 5 YEARS 7/27/2022 [facility] SPECIAL PROCEDURES    IR TUNNELED CVC PLACE WO SQ PORT/PUMP > 5 YEARS  11/28/2022    IR TUNNELED CATHETER PLACEMENT GREATER THAN 5 YEARS 11/28/2022 [facility] SPECIAL PROCEDURES    NOSE SURGERY  1993    \"had reconstruction surgery on nose a year after the other nose surgery\"    OTHER SURGICAL HISTORY Right 05/22/2015    I & D right great toe with partial amputation    OTHER SURGICAL HISTORY  12/04/2017    inc and drainage of abcess    IA INCISION BONE CORTEX FOOT Left 09/22/2018    LEFT FOOT DEBRIDEMENT INCISION AND DRAINAGE TOP AND BOTTOM performed by Jose Caba DPM at [facility] OR    SCROTAL SURGERY N/A 05/15/2021    SCROTAL AND

## 2025-02-24 NOTE — PLAN OF CARE
Problem: Pain  Goal: Verbalizes/displays adequate comfort level or baseline comfort level  Outcome: Progressing     Problem: Chronic Conditions and Co-morbidities  Goal: Patient's chronic conditions and co-morbidity symptoms are monitored and maintained or improved  Outcome: Progressing     Problem: Discharge Planning  Goal: Discharge to home or other facility with appropriate resources  Outcome: Progressing     Problem: Safety - Adult  Goal: Free from fall injury  Outcome: Progressing     Problem: Skin/Tissue Integrity  Goal: Skin integrity remains intact  Description: 1.  Monitor for areas of redness and/or skin breakdown  2.  Assess vascular access sites hourly  3.  Every 4-6 hours minimum:  Change oxygen saturation probe site  4.  Every 4-6 hours:  If on nasal continuous positive airway pressure, respiratory therapy assess nares and determine need for appliance change or resting period  Outcome: Progressing

## 2025-02-27 ENCOUNTER — HOSPITAL ENCOUNTER (OUTPATIENT)
Dept: WOUND CARE | Age: 50
Discharge: HOME | End: 2025-02-27
Attending: STUDENT IN AN ORGANIZED HEALTH CARE EDUCATION/TRAINING PROGRAM
Payer: MEDICARE

## 2025-02-27 DIAGNOSIS — E11.621 TYPE 2 DIABETES MELLITUS WITH FOOT ULCER, WITH LONG-TERM CURRENT USE OF INSULIN (HCC): ICD-10-CM

## 2025-02-27 DIAGNOSIS — L97.412 DIABETIC ULCER OF RIGHT MIDFOOT ASSOCIATED WITH TYPE 2 DIABETES MELLITUS, WITH FAT LAYER EXPOSED (HCC): Primary | ICD-10-CM

## 2025-02-27 DIAGNOSIS — L97.509 TYPE 2 DIABETES MELLITUS WITH FOOT ULCER, WITH LONG-TERM CURRENT USE OF INSULIN (HCC): ICD-10-CM

## 2025-02-27 DIAGNOSIS — Z79.4 TYPE 2 DIABETES MELLITUS WITH FOOT ULCER, WITH LONG-TERM CURRENT USE OF INSULIN (HCC): ICD-10-CM

## 2025-02-27 DIAGNOSIS — E11.621 DIABETIC ULCER OF RIGHT MIDFOOT ASSOCIATED WITH TYPE 2 DIABETES MELLITUS, WITH FAT LAYER EXPOSED (HCC): Primary | ICD-10-CM

## 2025-02-27 DIAGNOSIS — L98.491 ULCER OF ABDOMEN WALL, LIMITED TO BREAKDOWN OF SKIN (HCC): ICD-10-CM

## 2025-02-27 DIAGNOSIS — T23.222A PARTIAL THICKNESS BURN OF FINGER OF LEFT HAND, INITIAL ENCOUNTER: ICD-10-CM

## 2025-02-27 DIAGNOSIS — Z89.422: ICD-10-CM

## 2025-02-27 PROBLEM — L97.912 TRAUMATIC LEG ULCER, RIGHT, WITH FAT LAYER EXPOSED (HCC): Status: RESOLVED | Noted: 2024-12-19 | Resolved: 2025-02-27

## 2025-02-27 PROBLEM — L97.422 DIABETIC ULCER OF LEFT MIDFOOT ASSOCIATED WITH TYPE 2 DIABETES MELLITUS, WITH FAT LAYER EXPOSED (HCC): Status: RESOLVED | Noted: 2024-11-19 | Resolved: 2025-02-27

## 2025-02-27 PROBLEM — L97.421 DIABETIC ULCER OF LEFT MIDFOOT ASSOCIATED WITH TYPE 2 DIABETES MELLITUS, LIMITED TO BREAKDOWN OF SKIN (HCC): Status: RESOLVED | Noted: 2025-01-16 | Resolved: 2025-02-27

## 2025-02-27 PROBLEM — L98.492 SKIN ULCER OF SCALP WITH FAT LAYER EXPOSED (HCC): Status: RESOLVED | Noted: 2024-12-19 | Resolved: 2025-02-27

## 2025-02-27 PROCEDURE — 6370000000 HC RX 637 (ALT 250 FOR IP): Performed by: SURGERY

## 2025-02-27 PROCEDURE — 2500000003 HC RX 250 WO HCPCS: Performed by: SURGERY

## 2025-02-27 PROCEDURE — 11042 DBRDMT SUBQ TIS 1ST 20SQCM/<: CPT

## 2025-02-27 RX ORDER — LIDOCAINE HYDROCHLORIDE 20 MG/ML
JELLY TOPICAL ONCE
OUTPATIENT
Start: 2025-02-27 | End: 2025-02-27

## 2025-02-27 RX ORDER — LIDOCAINE 40 MG/G
CREAM TOPICAL ONCE
OUTPATIENT
Start: 2025-02-27 | End: 2025-02-27

## 2025-02-27 RX ORDER — BACITRACIN ZINC AND POLYMYXIN B SULFATE 500; 1000 [USP'U]/G; [USP'U]/G
OINTMENT TOPICAL ONCE
OUTPATIENT
Start: 2025-02-27 | End: 2025-02-27

## 2025-02-27 RX ORDER — TRIAMCINOLONE ACETONIDE 1 MG/G
OINTMENT TOPICAL ONCE
OUTPATIENT
Start: 2025-02-27 | End: 2025-02-27

## 2025-02-27 RX ORDER — GENTAMICIN SULFATE 1 MG/G
OINTMENT TOPICAL ONCE
OUTPATIENT
Start: 2025-02-27 | End: 2025-02-27

## 2025-02-27 RX ORDER — SODIUM CHLOR/HYPOCHLOROUS ACID 0.033 %
SOLUTION, IRRIGATION IRRIGATION ONCE
OUTPATIENT
Start: 2025-02-27 | End: 2025-02-27

## 2025-02-27 RX ORDER — SILVER SULFADIAZINE 10 MG/G
CREAM TOPICAL ONCE
Status: COMPLETED | OUTPATIENT
Start: 2025-02-27 | End: 2025-02-27

## 2025-02-27 RX ORDER — LIDOCAINE 50 MG/G
OINTMENT TOPICAL ONCE
OUTPATIENT
Start: 2025-02-27 | End: 2025-02-27

## 2025-02-27 RX ORDER — GINSENG 100 MG
CAPSULE ORAL ONCE
OUTPATIENT
Start: 2025-02-27 | End: 2025-02-27

## 2025-02-27 RX ORDER — GENTAMICIN SULFATE 1 MG/G
OINTMENT TOPICAL ONCE
Status: COMPLETED | OUTPATIENT
Start: 2025-02-27 | End: 2025-02-27

## 2025-02-27 RX ORDER — LIDOCAINE 50 MG/G
OINTMENT TOPICAL ONCE
Status: COMPLETED | OUTPATIENT
Start: 2025-02-27 | End: 2025-02-27

## 2025-02-27 RX ORDER — LIDOCAINE HYDROCHLORIDE 40 MG/ML
SOLUTION TOPICAL ONCE
OUTPATIENT
Start: 2025-02-27 | End: 2025-02-27

## 2025-02-27 RX ORDER — CLOBETASOL PROPIONATE 0.5 MG/G
OINTMENT TOPICAL ONCE
OUTPATIENT
Start: 2025-02-27 | End: 2025-02-27

## 2025-02-27 RX ORDER — BETAMETHASONE DIPROPIONATE 0.5 MG/G
CREAM TOPICAL ONCE
OUTPATIENT
Start: 2025-02-27 | End: 2025-02-27

## 2025-02-27 RX ORDER — NEOMYCIN/BACITRACIN/POLYMYXINB 3.5-400-5K
OINTMENT (GRAM) TOPICAL ONCE
OUTPATIENT
Start: 2025-02-27 | End: 2025-02-27

## 2025-02-27 RX ORDER — SILVER SULFADIAZINE 10 MG/G
CREAM TOPICAL ONCE
OUTPATIENT
Start: 2025-02-27 | End: 2025-02-27

## 2025-02-27 RX ORDER — MUPIROCIN 20 MG/G
OINTMENT TOPICAL ONCE
OUTPATIENT
Start: 2025-02-27 | End: 2025-02-27

## 2025-02-27 RX ADMIN — GENTAMICIN SULFATE: 1 OINTMENT TOPICAL at 11:15

## 2025-02-27 RX ADMIN — COLLAGENASE SANTYL: 250 OINTMENT TOPICAL at 11:14

## 2025-02-27 RX ADMIN — SILVER SULFADIAZINE: 10 CREAM TOPICAL at 11:15

## 2025-02-27 RX ADMIN — LIDOCAINE: 50 OINTMENT TOPICAL at 11:13

## 2025-02-27 ASSESSMENT — PAIN DESCRIPTION - PAIN TYPE: TYPE: CHRONIC PAIN

## 2025-02-27 ASSESSMENT — PAIN DESCRIPTION - ORIENTATION: ORIENTATION: LEFT;RIGHT

## 2025-02-27 ASSESSMENT — PAIN DESCRIPTION - LOCATION: LOCATION: FOOT

## 2025-02-27 ASSESSMENT — PAIN DESCRIPTION - DESCRIPTORS: DESCRIPTORS: SHARP

## 2025-02-27 ASSESSMENT — PAIN DESCRIPTION - FREQUENCY: FREQUENCY: CONTINUOUS

## 2025-02-27 ASSESSMENT — PAIN SCALES - GENERAL: PAINLEVEL_OUTOF10: 6

## 2025-02-27 ASSESSMENT — PAIN DESCRIPTION - ONSET: ONSET: ON-GOING

## 2025-02-27 ASSESSMENT — PAIN - FUNCTIONAL ASSESSMENT: PAIN_FUNCTIONAL_ASSESSMENT: PREVENTS OR INTERFERES SOME ACTIVE ACTIVITIES AND ADLS

## 2025-02-27 NOTE — PROGRESS NOTES
Multilayer Compression Wrap   (Not Unna) Below the Knee    NAME:  Yovanny Levine  YOB: 1975  MEDICAL RECORD NUMBER:  [id number]  DATE:  2/27/2025    Multilayer compression wrap: Removed old Multilayer wrap if indicated and wash leg with mild soap/water.  Applied moisturizing agent to dry skin as needed.   Applied primary and secondary dressing as ordered.  Applied multilayered dressing below the knee to right lower leg.  Instructed patient/caregiver not to remove dressing and to keep it clean and dry.   Instructed patient/caregiver on complications to report to provider, such as pain, numbness in toes, heavy drainage, and slippage of dressing.  Instructed patient on purpose of compression dressing and on activity and exercise recommendations.      Electronically signed by LYNSEY CASTILLO LPN on 2/27/2025 at 11:12 AM  
Nonselective enzymatic debridement performed with Santyl per physician order to wound(s) of the abdomen  Patient tolerated the procedure well.   
Dry;Other (Comment) 02/24/25 0409   Drainage Amount None (dry) 02/27/25 1026   Drainage Description Serosanguinous 02/19/25 1400   Odor None 02/27/25 1026   Shazia-wound Assessment Hyperkeratosis (callous) 02/27/25 1026   Margins Attached edges 02/27/25 1026   Wound Thickness Description not for Pressure Injury Full thickness 02/19/25 1400   Number of days: 14     Total  Area  Debrided: 1.9 sq cm     Bleeding:  Minimal    Hemostasis Achieved:  by pressure    Procedural Pain:  0  / 10     Post Procedural Pain:  0 / 10     Response to treatment:  Well tolerated by patient.     Procedure Note    Indications:  Based on my examination of this patient's wound(s) today, sharp excision into subcutaneous tissue is required to promote healing and evaluate the extent of previous healing.    Performed by: APPLE Aguilera CNP    Consent obtained: Yes    Time out taken:  Yes    Pain Control: 2% Lidocaine topical spray    Debridement:Excisional Debridement    Using curette the wound(s) was/were sharply debrided down through and including the removal of subcutaneous tissue.        Devitalized Tissue Debrided:  fibrin, biofilm, slough, and exudate    Pre Debridement Measurements:  Are located in the Wound Documentation Flow Sheet    All active wounds listed below with today's date are evaluated  Wound(s)    debrided this date include # : 3     Post  Debridement Measurements:  Wound 02/18/25, #3 Finger (Comment which one) Left 3rd middle (Active)   Wound Image   02/18/25 1000   Wound Etiology Burn 02/20/25 2000   Dressing Status New dressing applied;Clean;Dry 02/27/25 1109   Wound Cleansed Wound cleanser 02/27/25 1026   Dressing/Treatment Other (comment) 02/27/25 1109   Offloading for Diabetic Foot Ulcers Offloading not required 02/27/25 1109   Wound Length (cm) 1.1 cm 02/27/25 1026   Wound Width (cm) 0.6 cm 02/27/25 1026   Wound Depth (cm) 0.1 cm 02/27/25 1026   Wound Surface Area (cm^2) 0.66 cm^2 02/27/25 1026   Change in

## 2025-02-27 NOTE — PATIENT INSTRUCTIONS
PHYSICIAN ORDERS AND DISCHARGE INSTRUCTIONS     Wound Care Notes:  Sees Dr Kumar.                Applied for Kerecis grafts on 24  Donated Kerecis applied to left lateral foot 24                             Orders for this week: 2025    Right Plantar:   Apply stimulen powder to wound bed   Pack with betadine gauze  Offload with qwick to periwound  Cover with zetuvit   Wrap w/ coban 2 lites  Leave in place for 1 week    Left hand 3rd finger: Wash with soap and water, pat dry  Apply silvadene and stimulen to wound bed   Cover with conform and coban to finger   Change daily      Abdomen: Wash with soap and water, pat dry.  Shave around area   Santyl, Gentamicin, lidocaine, and stimulen powder  Cover with ca alginate and gentac w/ tegaderm  Change Daily       Leave in place til next visit to [facility]    Plan: HBO workup started 9/10/24.   CM discharge due to not home bound 24.   Toenails 2025. Called senait about abx. No answer 10/28/24   Cultures sent from R heel 25     Follow Up Instructions: 1 weeks  Primary Wound Care Provider: Shelly Rubalcava CNP  Call  for any questions or concerns.  Central [phone] for imaging lab work

## 2025-03-04 ENCOUNTER — HOSPITAL ENCOUNTER (INPATIENT)
Age: 50
LOS: 4 days | Discharge: HOME HEALTH | DRG: 640 | End: 2025-03-08
Attending: STUDENT IN AN ORGANIZED HEALTH CARE EDUCATION/TRAINING PROGRAM | Admitting: STUDENT IN AN ORGANIZED HEALTH CARE EDUCATION/TRAINING PROGRAM
Payer: MEDICARE

## 2025-03-04 ENCOUNTER — APPOINTMENT (OUTPATIENT)
Dept: GENERAL RADIOLOGY | Age: 50
DRG: 640 | End: 2025-03-04
Payer: MEDICARE

## 2025-03-04 DIAGNOSIS — N28.9 HYPERVOLEMIA ASSOCIATED WITH RENAL INSUFFICIENCY: Primary | ICD-10-CM

## 2025-03-04 DIAGNOSIS — R06.02 SHORTNESS OF BREATH: ICD-10-CM

## 2025-03-04 DIAGNOSIS — E87.70 HYPERVOLEMIA ASSOCIATED WITH RENAL INSUFFICIENCY: Primary | ICD-10-CM

## 2025-03-04 DIAGNOSIS — R07.9 CHEST PAIN, UNSPECIFIED TYPE: ICD-10-CM

## 2025-03-04 LAB
ALBUMIN SERPL-MCNC: 4.1 G/DL (ref 3.4–5)
ALBUMIN/GLOB SERPL: 1.3 {RATIO} (ref 1.1–2.2)
ALP SERPL-CCNC: 80 U/L (ref 40–129)
ALT SERPL-CCNC: 23 U/L (ref 10–40)
ANION GAP SERPL CALCULATED.3IONS-SCNC: 22 MMOL/L (ref 9–17)
AST SERPL-CCNC: 37 U/L (ref 15–37)
BASOPHILS # BLD: 0.05 K/UL
BASOPHILS NFR BLD: 1 % (ref 0–1)
BILIRUB SERPL-MCNC: 0.7 MG/DL (ref 0–1)
BNP SERPL-MCNC: ABNORMAL PG/ML (ref 0–125)
BUN SERPL-MCNC: 66 MG/DL (ref 7–20)
CALCIUM SERPL-MCNC: 8.2 MG/DL (ref 8.3–10.6)
CHLORIDE SERPL-SCNC: 95 MMOL/L (ref 99–110)
CO2 SERPL-SCNC: 21 MMOL/L (ref 21–32)
CREAT SERPL-MCNC: 6.7 MG/DL (ref 0.9–1.3)
EOSINOPHIL # BLD: 0.11 K/UL
EOSINOPHILS RELATIVE PERCENT: 2 % (ref 0–3)
ERYTHROCYTE [DISTWIDTH] IN BLOOD BY AUTOMATED COUNT: 15.6 % (ref 11.7–14.9)
GFR, ESTIMATED: 9 ML/MIN/1.73M2
GLUCOSE BLD-MCNC: 164 MG/DL
GLUCOSE SERPL-MCNC: 140 MG/DL (ref 74–99)
HCT VFR BLD AUTO: 33.8 % (ref 42–52)
HGB BLD-MCNC: 10.7 G/DL (ref 13.5–18)
IMM GRANULOCYTES # BLD AUTO: 0.03 K/UL
IMM GRANULOCYTES NFR BLD: 1 %
LYMPHOCYTES NFR BLD: 0.44 K/UL
LYMPHOCYTES RELATIVE PERCENT: 7 % (ref 24–44)
MAGNESIUM SERPL-MCNC: 2.2 MG/DL (ref 1.8–2.4)
MCH RBC QN AUTO: 31.4 PG (ref 27–31)
MCHC RBC AUTO-ENTMCNC: 31.7 G/DL (ref 32–36)
MCV RBC AUTO: 99.1 FL (ref 78–100)
MONOCYTES NFR BLD: 0.6 K/UL
MONOCYTES NFR BLD: 10 % (ref 0–4)
NEUTROPHILS NFR BLD: 81 % (ref 36–66)
NEUTS SEG NFR BLD: 5.08 K/UL
PLATELET # BLD AUTO: 159 K/UL (ref 140–440)
PMV BLD AUTO: 9.9 FL (ref 7.5–11.1)
POTASSIUM SERPL-SCNC: 4.5 MMOL/L (ref 3.5–5.1)
POTASSIUM SERPL-SCNC: 5.4 MMOL/L (ref 3.5–5.1)
PROT SERPL-MCNC: 7.3 G/DL (ref 6.4–8.2)
RBC # BLD AUTO: 3.41 M/UL (ref 4.6–6.2)
SODIUM SERPL-SCNC: 137 MMOL/L (ref 136–145)
TROPONIN I SERPL HS-MCNC: 130 NG/L (ref 0–22)
TROPONIN I SERPL HS-MCNC: 133 NG/L (ref 0–22)
WBC OTHER # BLD: 6.3 K/UL (ref 4–10.5)

## 2025-03-04 PROCEDURE — 99285 EMERGENCY DEPT VISIT HI MDM: CPT

## 2025-03-04 PROCEDURE — 2700000000 HC OXYGEN THERAPY PER DAY

## 2025-03-04 PROCEDURE — 83735 ASSAY OF MAGNESIUM: CPT

## 2025-03-04 PROCEDURE — 6360000002 HC RX W HCPCS: Performed by: NURSE PRACTITIONER

## 2025-03-04 PROCEDURE — 6370000000 HC RX 637 (ALT 250 FOR IP): Performed by: STUDENT IN AN ORGANIZED HEALTH CARE EDUCATION/TRAINING PROGRAM

## 2025-03-04 PROCEDURE — 82962 GLUCOSE BLOOD TEST: CPT

## 2025-03-04 PROCEDURE — 85025 COMPLETE CBC W/AUTO DIFF WBC: CPT

## 2025-03-04 PROCEDURE — 83880 ASSAY OF NATRIURETIC PEPTIDE: CPT

## 2025-03-04 PROCEDURE — 71045 X-RAY EXAM CHEST 1 VIEW: CPT

## 2025-03-04 PROCEDURE — 84484 ASSAY OF TROPONIN QUANT: CPT

## 2025-03-04 PROCEDURE — 84132 ASSAY OF SERUM POTASSIUM: CPT

## 2025-03-04 PROCEDURE — 94640 AIRWAY INHALATION TREATMENT: CPT

## 2025-03-04 PROCEDURE — 80053 COMPREHEN METABOLIC PANEL: CPT

## 2025-03-04 PROCEDURE — 2140000000 HC CCU INTERMEDIATE R&B

## 2025-03-04 PROCEDURE — 2500000003 HC RX 250 WO HCPCS: Performed by: STUDENT IN AN ORGANIZED HEALTH CARE EDUCATION/TRAINING PROGRAM

## 2025-03-04 PROCEDURE — 94761 N-INVAS EAR/PLS OXIMETRY MLT: CPT

## 2025-03-04 PROCEDURE — 6360000002 HC RX W HCPCS: Performed by: STUDENT IN AN ORGANIZED HEALTH CARE EDUCATION/TRAINING PROGRAM

## 2025-03-04 PROCEDURE — 96374 THER/PROPH/DIAG INJ IV PUSH: CPT

## 2025-03-04 RX ORDER — HYDRALAZINE HYDROCHLORIDE 25 MG/1
25 TABLET, FILM COATED ORAL 2 TIMES DAILY
Status: DISCONTINUED | OUTPATIENT
Start: 2025-03-04 | End: 2025-03-08 | Stop reason: HOSPADM

## 2025-03-04 RX ORDER — ASPIRIN 81 MG/1
TABLET, CHEWABLE ORAL
Status: DISCONTINUED
Start: 2025-03-04 | End: 2025-03-04

## 2025-03-04 RX ORDER — METOPROLOL SUCCINATE 25 MG/1
25 TABLET, EXTENDED RELEASE ORAL DAILY
Status: DISCONTINUED | OUTPATIENT
Start: 2025-03-04 | End: 2025-03-08 | Stop reason: HOSPADM

## 2025-03-04 RX ORDER — ACETAMINOPHEN 650 MG/1
650 SUPPOSITORY RECTAL EVERY 6 HOURS PRN
Status: DISCONTINUED | OUTPATIENT
Start: 2025-03-04 | End: 2025-03-08 | Stop reason: HOSPADM

## 2025-03-04 RX ORDER — LINEZOLID 600 MG/1
600 TABLET, FILM COATED ORAL ONCE
Status: DISCONTINUED | OUTPATIENT
Start: 2025-03-04 | End: 2025-03-04

## 2025-03-04 RX ORDER — ONDANSETRON 4 MG/1
4 TABLET, ORALLY DISINTEGRATING ORAL EVERY 8 HOURS PRN
Status: DISCONTINUED | OUTPATIENT
Start: 2025-03-04 | End: 2025-03-08 | Stop reason: HOSPADM

## 2025-03-04 RX ORDER — MORPHINE SULFATE 4 MG/ML
4 INJECTION, SOLUTION INTRAMUSCULAR; INTRAVENOUS ONCE
Status: COMPLETED | OUTPATIENT
Start: 2025-03-04 | End: 2025-03-04

## 2025-03-04 RX ORDER — HEPARIN SODIUM 5000 [USP'U]/ML
5000 INJECTION, SOLUTION INTRAVENOUS; SUBCUTANEOUS EVERY 8 HOURS SCHEDULED
Status: DISCONTINUED | OUTPATIENT
Start: 2025-03-05 | End: 2025-03-08 | Stop reason: HOSPADM

## 2025-03-04 RX ORDER — CALCITRIOL 0.25 UG/1
0.5 CAPSULE, LIQUID FILLED ORAL 2 TIMES DAILY
Status: DISCONTINUED | OUTPATIENT
Start: 2025-03-04 | End: 2025-03-08 | Stop reason: HOSPADM

## 2025-03-04 RX ORDER — CALCIUM CARBONATE 500 MG/1
2 TABLET, CHEWABLE ORAL 2 TIMES DAILY
Status: DISCONTINUED | OUTPATIENT
Start: 2025-03-04 | End: 2025-03-08 | Stop reason: HOSPADM

## 2025-03-04 RX ORDER — ATORVASTATIN CALCIUM 40 MG/1
40 TABLET, FILM COATED ORAL NIGHTLY
Status: DISCONTINUED | OUTPATIENT
Start: 2025-03-05 | End: 2025-03-08 | Stop reason: HOSPADM

## 2025-03-04 RX ORDER — DEXTROSE MONOHYDRATE 100 MG/ML
INJECTION, SOLUTION INTRAVENOUS CONTINUOUS PRN
Status: DISCONTINUED | OUTPATIENT
Start: 2025-03-04 | End: 2025-03-08 | Stop reason: HOSPADM

## 2025-03-04 RX ORDER — SODIUM CHLORIDE 9 MG/ML
INJECTION, SOLUTION INTRAVENOUS PRN
Status: DISCONTINUED | OUTPATIENT
Start: 2025-03-04 | End: 2025-03-08 | Stop reason: HOSPADM

## 2025-03-04 RX ORDER — ONDANSETRON 2 MG/ML
4 INJECTION INTRAMUSCULAR; INTRAVENOUS EVERY 6 HOURS PRN
Status: DISCONTINUED | OUTPATIENT
Start: 2025-03-04 | End: 2025-03-08 | Stop reason: HOSPADM

## 2025-03-04 RX ORDER — ISOSORBIDE MONONITRATE 30 MG/1
30 TABLET, EXTENDED RELEASE ORAL DAILY
Status: DISCONTINUED | OUTPATIENT
Start: 2025-03-04 | End: 2025-03-08 | Stop reason: HOSPADM

## 2025-03-04 RX ORDER — PANTOPRAZOLE SODIUM 40 MG/1
40 TABLET, DELAYED RELEASE ORAL DAILY
Status: DISCONTINUED | OUTPATIENT
Start: 2025-03-05 | End: 2025-03-08 | Stop reason: HOSPADM

## 2025-03-04 RX ORDER — INSULIN LISPRO 100 [IU]/ML
0-4 INJECTION, SOLUTION INTRAVENOUS; SUBCUTANEOUS
Status: DISCONTINUED | OUTPATIENT
Start: 2025-03-04 | End: 2025-03-08 | Stop reason: HOSPADM

## 2025-03-04 RX ORDER — ACETAMINOPHEN 325 MG/1
650 TABLET ORAL EVERY 6 HOURS PRN
Status: DISCONTINUED | OUTPATIENT
Start: 2025-03-04 | End: 2025-03-08 | Stop reason: HOSPADM

## 2025-03-04 RX ORDER — LINEZOLID 600 MG/1
600 TABLET, FILM COATED ORAL EVERY 12 HOURS SCHEDULED
Status: DISCONTINUED | OUTPATIENT
Start: 2025-03-04 | End: 2025-03-08 | Stop reason: HOSPADM

## 2025-03-04 RX ORDER — OXYCODONE AND ACETAMINOPHEN 7.5; 325 MG/1; MG/1
1 TABLET ORAL EVERY 8 HOURS PRN
Status: DISCONTINUED | OUTPATIENT
Start: 2025-03-04 | End: 2025-03-08 | Stop reason: HOSPADM

## 2025-03-04 RX ORDER — ALBUTEROL SULFATE 90 UG/1
2 INHALANT RESPIRATORY (INHALATION) 4 TIMES DAILY PRN
Status: DISCONTINUED | OUTPATIENT
Start: 2025-03-04 | End: 2025-03-08 | Stop reason: HOSPADM

## 2025-03-04 RX ORDER — POLYETHYLENE GLYCOL 3350 17 G/17G
17 POWDER, FOR SOLUTION ORAL DAILY PRN
Status: DISCONTINUED | OUTPATIENT
Start: 2025-03-04 | End: 2025-03-08 | Stop reason: HOSPADM

## 2025-03-04 RX ORDER — ROPINIROLE 0.25 MG/1
0.25 TABLET, FILM COATED ORAL NIGHTLY
Status: DISCONTINUED | OUTPATIENT
Start: 2025-03-04 | End: 2025-03-08 | Stop reason: HOSPADM

## 2025-03-04 RX ORDER — SODIUM CHLORIDE 0.9 % (FLUSH) 0.9 %
5-40 SYRINGE (ML) INJECTION EVERY 12 HOURS SCHEDULED
Status: DISCONTINUED | OUTPATIENT
Start: 2025-03-04 | End: 2025-03-08 | Stop reason: HOSPADM

## 2025-03-04 RX ORDER — CLOPIDOGREL BISULFATE 75 MG/1
75 TABLET ORAL DAILY
Status: DISCONTINUED | OUTPATIENT
Start: 2025-03-05 | End: 2025-03-08 | Stop reason: HOSPADM

## 2025-03-04 RX ORDER — INSULIN GLARGINE 100 [IU]/ML
10 INJECTION, SOLUTION SUBCUTANEOUS NIGHTLY
Status: DISCONTINUED | OUTPATIENT
Start: 2025-03-04 | End: 2025-03-08 | Stop reason: HOSPADM

## 2025-03-04 RX ORDER — GLUCAGON 1 MG/ML
1 KIT INJECTION PRN
Status: DISCONTINUED | OUTPATIENT
Start: 2025-03-04 | End: 2025-03-08 | Stop reason: HOSPADM

## 2025-03-04 RX ORDER — SODIUM CHLORIDE 0.9 % (FLUSH) 0.9 %
5-40 SYRINGE (ML) INJECTION PRN
Status: DISCONTINUED | OUTPATIENT
Start: 2025-03-04 | End: 2025-03-08 | Stop reason: HOSPADM

## 2025-03-04 RX ORDER — ASPIRIN 81 MG/1
81 TABLET, CHEWABLE ORAL DAILY
Status: DISCONTINUED | OUTPATIENT
Start: 2025-03-05 | End: 2025-03-08 | Stop reason: HOSPADM

## 2025-03-04 RX ADMIN — SODIUM CHLORIDE, PRESERVATIVE FREE 10 ML: 5 INJECTION INTRAVENOUS at 23:38

## 2025-03-04 RX ADMIN — INSULIN GLARGINE 10 UNITS: 100 INJECTION, SOLUTION SUBCUTANEOUS at 23:57

## 2025-03-04 RX ADMIN — ONDANSETRON 4 MG: 2 INJECTION INTRAMUSCULAR; INTRAVENOUS at 20:28

## 2025-03-04 RX ADMIN — LINEZOLID 600 MG: 600 TABLET, FILM COATED ORAL at 20:28

## 2025-03-04 RX ADMIN — MORPHINE SULFATE 4 MG: 4 INJECTION INTRAVENOUS at 18:09

## 2025-03-04 RX ADMIN — ALBUTEROL SULFATE 2 PUFF: 90 AEROSOL, METERED RESPIRATORY (INHALATION) at 19:52

## 2025-03-04 ASSESSMENT — PAIN DESCRIPTION - DESCRIPTORS: DESCRIPTORS: STABBING;PRESSURE

## 2025-03-04 ASSESSMENT — PAIN DESCRIPTION - ORIENTATION: ORIENTATION: LEFT

## 2025-03-04 ASSESSMENT — PAIN - FUNCTIONAL ASSESSMENT: PAIN_FUNCTIONAL_ASSESSMENT: 0-10

## 2025-03-04 ASSESSMENT — PAIN SCALES - GENERAL
PAINLEVEL_OUTOF10: 7
PAINLEVEL_OUTOF10: 7

## 2025-03-04 NOTE — H&P
History and Physical      Name:  Yovanny Levine /Age/Sex: 1975  (49 y.o. male)   MRN & CSN:  [id number] & [id number] Encounter Date/Time: 3/4/2025 6:27 PM   Location:  ED[facility]/ED-16 PCP: José Luis Izquierdo MD       Hospital Day: 1    Assessment and Plan:     Patient is a 49 y.o. male who presented with SOB.    # Volume overload  # ESRD on HD MWF  # HFrEF, compensated  - Reported worsening SOB and orthopnea over past 2 days. Oliguric, last HD Monday 3/3, full treatment. Last TTE in 2024 with LVEF of 40-45 with G3DD. Had multiple recent admission for similar issue.   - Clinically hypervolemic. Pro-BNP >70k. CXR with pulmonary congestion.   - ED provider discussed with nephrology, plan for HD in the morning.- Continue Calcitrol.     # CAD s/p PCI to pLAD in 2024  # Non-MI chronic troponin elevation  - Denied any typical CP.   - Initial Tn elevated, but stable compared to previous levels. ECG without acute ischemic changes.  - Continue ASA, Plavix, Lipitor, Toprol-XL and Imdur.    # Hx of VT in 2024  - Continue Toprol-XL.   - Monitor electrolytes.       # Chronic left foot osteomyelitis  - Following with wound care. Recently evaluated by ID, started on 2-week course of Zyvox, ending 3/10.  - Continue Zyvox.    # Chronic hypoxemic respiratory failure  - On 2-3L NC qhs.      # Essential hypertension  - Continue hydralazine and Toprol-XL.    # Mixed hyperlipidemia  - Continue Lipitor.    # T2DM with hyperglycemia  - Last A1c 5.7% in 2024, repeat pending.   - Continue Jardiance and insulin Lantus 10 u qhs with LCSI.    # PUD  - Continue PPI.    # RLS  - Continue Requip.     # Chronic pain  - On Percocet.    # Class II obesity  - BMI 38.4.     Checklist:  Advanced care planning: full  Diet: cardiac / diabetic    Sugar: BG goal of 140-180 while inpatient  VTE ppx: heparin      Disposition: admit to inpatient.  Estimated discharge: 2-3 day(s).  Current living situation: home.  Expected disposition:  results found for: \"BLOODCULT2\"  Organism:   Lab Results   Component Value Date/Time    [facility] 01/07/2019 12:08 AM       Radiology results:  XR CHEST PORTABLE   Final Result          Luis Enrique Guzman MD  03/04/25 6:27 PM

## 2025-03-04 NOTE — ED PROVIDER NOTES
[facility] EMERGENCY DEPARTMENT  EMERGENCY DEPARTMENT ENCOUNTER        Pt Name: Yovanny Levine  MRN: [id number]  Birthdate 1975  Date of evaluation: 3/4/2025  Provider: APPLE MOTA - CNP  PCP: José Luis Izquierdo MD    RUSTY. I have evaluated this patient.        Triage CHIEF COMPLAINT       Chief Complaint   Patient presents with    Chest Pain     Since yesterday. Was given 240 of asa         HISTORY OF PRESENT ILLNESS      Chief Complaint: Chest pain, shortness of breath    Yovanny Levine is a 49 y.o. male who presents for evaluation of increased shortness of breath and chest pressure over the last couple of days.  Patient reports he has had some increased swelling over the last couple of days.  Was recently admitted with similar symptoms and had a lot of fluid in his lungs that had to be drained off.  He went to dialysis yesterday and they removed 5 L.  Dr. Parmar wanted him to return today for additional dialysis but the patient did not have transportation for this.  They also encouraged him to come to the hospital yesterday for evaluation of the chest pain.  He denies any abdominal pain, nausea, vomiting.  He has had a mild cough but no other URI symptoms or fever.    Nursing Notes were all reviewed and agreed with or any disagreements were addressed in the HPI.    REVIEW OF SYSTEMS     Pertinent ROS as noted in HPI.      PAST MEDICAL HISTORY     Past Medical History:   Diagnosis Date    Abscess 2010    scrotal    Acute renal failure (ARF) 08/04/2019    Anxiety associated with depression     Back pain 07/02/2012    CAD (coronary artery disease) 02/10/2023    Chest pain 05/01/2013    Diabetic neuropathy (HCC) 12/22/2011    Diabetic ulcer of left midfoot associated with type 2 diabetes mellitus, with fat layer exposed (HCC) 07/18/2017    Diverticulosis     C scope + Dr. Medina    DM (diabetes mellitus), type 2 (HCC) 2002    DR. Turner podiatry    ESRD (end stage renal disease)  Adnrzej Webber DPM at [facility] OR    IR NONTUNNELED VASCULAR CATHETER > 5 YEARS  07/25/2022    IR NONTUNNELED VASCULAR CATHETER 7/25/2022 [facility] PROC[facility]    IR TUNNELED CVC PLACE WO SQ PORT/PUMP > 5 YEARS  07/27/2022    IR TUNNELED CATHETER PLACEMENT GREATER THAN 5 YEARS 7/27/2022 [facility]    IR TUNNELED CVC PLACE WO SQ PORT/PUMP > 5 YEARS  11/28/2022    IR TUNNELED CATHETER PLACEMENT GREATER THAN 5 YEARS 11/28/2022 [facility] PROC[facility]    NOSE SURGERY  1993    \"had reconstruction surgery on nose a year after the other nose surgery\"    OTHER SURGICAL HISTORY Right 05/22/2015    I & D right great toe with partial amputation    OTHER SURGICAL HISTORY  12/04/2017    inc and drainage of abcess    AZ INCISION BONE CORTEX FOOT Left 09/22/2018    LEFT FOOT DEBRIDEMENT INCISION AND DRAINAGE TOP AND BOTTOM performed by Jose Caba DPM at [facility] OR    SCROTAL SURGERY N/A 05/15/2021    SCROTAL AND PERINEAL DEBRIDEMENT performed by Elder Betancur MD at [facility] OR    SCROTAL SURGERY N/A 05/16/2021    SCROTAL RE-DEBRIDEMENT  INCISION AND DRAINAGE performed by Elder Betancur MD at [facility] OR    SKIN BIOPSY N/A 06/18/2013    sebaceous cyst removal times 4     TOE AMPUTATION      right great toe    TOE AMPUTATION Right 03/23/2019    TOE AMPUTATION RIGHT 5TH TOE performed by Jose Caba DPM at [facility] OR    TOE AMPUTATION Right 08/06/2019    TOE AMPUTATION RIGHT 4TH TOE performed by Jose Caba DPM at [facility] OR    TOE AMPUTATION Left 9/6/2023    GREAT TOE TRANSMETATARSAL AMPUTATION performed by Sammy Hsieh MD at [facility] OR    TOE AMPUTATION Left 4/23/2024    left 5th TOE AMPUTATION performed by Claudy Forrest DO at [facility] OR    TONSILLECTOMY AND ADENOIDECTOMY  1988    UPPER GASTROINTESTINAL ENDOSCOPY  06/02/2011    Mild gastritis with moderatley severe antritis, small hiatal hernia, Dr. Medina    UPPER GASTROINTESTINAL ENDOSCOPY N/A 01/12/2019    EGD BIOPSY performed by Jose Maria Cornelius MD at [facility] ENDOSCOPY

## 2025-03-05 LAB
ALBUMIN: 3.8 G/DL (ref 3.4–5)
ANION GAP SERPL CALCULATED.3IONS-SCNC: 16 MMOL/L (ref 9–17)
BASOPHILS # BLD: 0.05 K/UL
BASOPHILS NFR BLD: 1 % (ref 0–1)
BUN SERPL-MCNC: 74 MG/DL (ref 7–20)
CALCIUM SERPL-MCNC: 7.8 MG/DL (ref 8.3–10.6)
CHLORIDE SERPL-SCNC: 94 MMOL/L (ref 99–110)
CO2 SERPL-SCNC: 26 MMOL/L (ref 21–32)
CREAT SERPL-MCNC: 6.9 MG/DL (ref 0.9–1.3)
EOSINOPHIL # BLD: 0.17 K/UL
EOSINOPHILS RELATIVE PERCENT: 3 % (ref 0–3)
ERYTHROCYTE [DISTWIDTH] IN BLOOD BY AUTOMATED COUNT: 15.2 % (ref 11.7–14.9)
EST. AVERAGE GLUCOSE BLD GHB EST-MCNC: 119 MG/DL
GFR, ESTIMATED: 9 ML/MIN/1.73M2
GLUCOSE BLD-MCNC: 112 MG/DL (ref 74–99)
GLUCOSE BLD-MCNC: 161 MG/DL (ref 74–99)
GLUCOSE BLD-MCNC: 164 MG/DL (ref 74–99)
GLUCOSE BLD-MCNC: 186 MG/DL (ref 74–99)
GLUCOSE SERPL-MCNC: 123 MG/DL (ref 74–99)
HBA1C MFR BLD: 5.8 % (ref 4.2–6.3)
HBV SURFACE AB SERPL IA-ACNC: 3.5 MIU/ML
HBV SURFACE AG SERPL QL IA: NONREACTIVE
HCT VFR BLD AUTO: 28.4 % (ref 42–52)
HGB BLD-MCNC: 9.2 G/DL (ref 13.5–18)
IMM GRANULOCYTES # BLD AUTO: 0.01 K/UL
IMM GRANULOCYTES NFR BLD: 0 %
LYMPHOCYTES NFR BLD: 0.38 K/UL
LYMPHOCYTES RELATIVE PERCENT: 7 % (ref 24–44)
MCH RBC QN AUTO: 31 PG (ref 27–31)
MCHC RBC AUTO-ENTMCNC: 32.4 G/DL (ref 32–36)
MCV RBC AUTO: 95.6 FL (ref 78–100)
MONOCYTES NFR BLD: 0.54 K/UL
MONOCYTES NFR BLD: 10 % (ref 0–4)
NEUTROPHILS NFR BLD: 80 % (ref 36–66)
NEUTS SEG NFR BLD: 4.55 K/UL
PHOSPHATE SERPL-MCNC: 8.4 MG/DL (ref 2.5–4.9)
PLATELET # BLD AUTO: 139 K/UL (ref 140–440)
PMV BLD AUTO: 9.4 FL (ref 7.5–11.1)
POTASSIUM SERPL-SCNC: 5.1 MMOL/L (ref 3.5–5.1)
RBC # BLD AUTO: 2.97 M/UL (ref 4.6–6.2)
SODIUM SERPL-SCNC: 136 MMOL/L (ref 136–145)
WBC OTHER # BLD: 5.7 K/UL (ref 4–10.5)

## 2025-03-05 PROCEDURE — 6360000002 HC RX W HCPCS: Performed by: INTERNAL MEDICINE

## 2025-03-05 PROCEDURE — 6360000002 HC RX W HCPCS: Performed by: STUDENT IN AN ORGANIZED HEALTH CARE EDUCATION/TRAINING PROGRAM

## 2025-03-05 PROCEDURE — 85025 COMPLETE CBC W/AUTO DIFF WBC: CPT

## 2025-03-05 PROCEDURE — 2700000000 HC OXYGEN THERAPY PER DAY

## 2025-03-05 PROCEDURE — 94761 N-INVAS EAR/PLS OXIMETRY MLT: CPT

## 2025-03-05 PROCEDURE — 2500000003 HC RX 250 WO HCPCS: Performed by: STUDENT IN AN ORGANIZED HEALTH CARE EDUCATION/TRAINING PROGRAM

## 2025-03-05 PROCEDURE — 82962 GLUCOSE BLOOD TEST: CPT

## 2025-03-05 PROCEDURE — 87340 HEPATITIS B SURFACE AG IA: CPT

## 2025-03-05 PROCEDURE — 6370000000 HC RX 637 (ALT 250 FOR IP): Performed by: STUDENT IN AN ORGANIZED HEALTH CARE EDUCATION/TRAINING PROGRAM

## 2025-03-05 PROCEDURE — 1200000000 HC SEMI PRIVATE

## 2025-03-05 PROCEDURE — 86317 IMMUNOASSAY INFECTIOUS AGENT: CPT

## 2025-03-05 PROCEDURE — 83036 HEMOGLOBIN GLYCOSYLATED A1C: CPT

## 2025-03-05 PROCEDURE — 80069 RENAL FUNCTION PANEL: CPT

## 2025-03-05 RX ORDER — HEPARIN SODIUM 1000 [USP'U]/ML
3400 INJECTION, SOLUTION INTRAVENOUS; SUBCUTANEOUS
Status: COMPLETED | OUTPATIENT
Start: 2025-03-05 | End: 2025-03-05

## 2025-03-05 RX ORDER — DIPHENHYDRAMINE HYDROCHLORIDE 50 MG/ML
25 INJECTION INTRAMUSCULAR; INTRAVENOUS EVERY 6 HOURS PRN
Status: DISCONTINUED | OUTPATIENT
Start: 2025-03-05 | End: 2025-03-08 | Stop reason: HOSPADM

## 2025-03-05 RX ADMIN — DIPHENHYDRAMINE HYDROCHLORIDE 25 MG: 50 INJECTION, SOLUTION INTRAMUSCULAR; INTRAVENOUS at 17:59

## 2025-03-05 RX ADMIN — SODIUM CHLORIDE, PRESERVATIVE FREE 10 ML: 5 INJECTION INTRAVENOUS at 15:31

## 2025-03-05 RX ADMIN — HYDRALAZINE HYDROCHLORIDE 25 MG: 25 TABLET ORAL at 20:49

## 2025-03-05 RX ADMIN — OXYCODONE HYDROCHLORIDE AND ACETAMINOPHEN 1 TABLET: 7.5; 325 TABLET ORAL at 15:24

## 2025-03-05 RX ADMIN — CALCIUM CARBONATE 1000 MG: 500 TABLET, CHEWABLE ORAL at 15:30

## 2025-03-05 RX ADMIN — INSULIN LISPRO 1 UNITS: 100 INJECTION, SOLUTION INTRAVENOUS; SUBCUTANEOUS at 17:59

## 2025-03-05 RX ADMIN — HYDRALAZINE HYDROCHLORIDE 25 MG: 25 TABLET ORAL at 15:24

## 2025-03-05 RX ADMIN — ONDANSETRON 4 MG: 2 INJECTION INTRAMUSCULAR; INTRAVENOUS at 01:23

## 2025-03-05 RX ADMIN — EMPAGLIFLOZIN 10 MG: 10 TABLET, FILM COATED ORAL at 15:24

## 2025-03-05 RX ADMIN — ISOSORBIDE MONONITRATE 30 MG: 30 TABLET, EXTENDED RELEASE ORAL at 15:22

## 2025-03-05 RX ADMIN — CALCIUM CARBONATE 1000 MG: 500 TABLET, CHEWABLE ORAL at 00:12

## 2025-03-05 RX ADMIN — ASPIRIN 81 MG CHEWABLE TABLET 81 MG: 81 TABLET CHEWABLE at 15:26

## 2025-03-05 RX ADMIN — METOPROLOL SUCCINATE 25 MG: 25 TABLET, EXTENDED RELEASE ORAL at 00:12

## 2025-03-05 RX ADMIN — HEPARIN SODIUM 3400 UNITS: 1000 INJECTION INTRAVENOUS; SUBCUTANEOUS at 09:33

## 2025-03-05 RX ADMIN — ONDANSETRON 4 MG: 2 INJECTION INTRAMUSCULAR; INTRAVENOUS at 08:04

## 2025-03-05 RX ADMIN — HEPARIN SODIUM 5000 UNITS: 5000 INJECTION INTRAVENOUS; SUBCUTANEOUS at 22:00

## 2025-03-05 RX ADMIN — OXYCODONE HYDROCHLORIDE AND ACETAMINOPHEN 1 TABLET: 7.5; 325 TABLET ORAL at 01:22

## 2025-03-05 RX ADMIN — CALCITRIOL 0.5 MCG: 0.5 CAPSULE, LIQUID FILLED ORAL at 00:13

## 2025-03-05 RX ADMIN — LINEZOLID 600 MG: 600 TABLET, FILM COATED ORAL at 20:53

## 2025-03-05 RX ADMIN — CLOPIDOGREL BISULFATE 75 MG: 75 TABLET ORAL at 15:24

## 2025-03-05 RX ADMIN — ATORVASTATIN CALCIUM 40 MG: 40 TABLET, FILM COATED ORAL at 20:49

## 2025-03-05 RX ADMIN — ROPINIROLE HYDROCHLORIDE 0.25 MG: 0.25 TABLET, FILM COATED ORAL at 20:50

## 2025-03-05 RX ADMIN — CALCITRIOL 0.5 MCG: 0.5 CAPSULE, LIQUID FILLED ORAL at 15:23

## 2025-03-05 RX ADMIN — CALCITRIOL 0.5 MCG: 0.5 CAPSULE, LIQUID FILLED ORAL at 20:50

## 2025-03-05 RX ADMIN — CALCIUM CARBONATE 1000 MG: 500 TABLET, CHEWABLE ORAL at 20:50

## 2025-03-05 RX ADMIN — INSULIN GLARGINE 10 UNITS: 100 INJECTION, SOLUTION SUBCUTANEOUS at 20:50

## 2025-03-05 RX ADMIN — HYDRALAZINE HYDROCHLORIDE 25 MG: 25 TABLET ORAL at 00:13

## 2025-03-05 RX ADMIN — SODIUM CHLORIDE, PRESERVATIVE FREE 10 ML: 5 INJECTION INTRAVENOUS at 20:53

## 2025-03-05 RX ADMIN — PANTOPRAZOLE SODIUM 40 MG: 40 TABLET, DELAYED RELEASE ORAL at 08:04

## 2025-03-05 RX ADMIN — MELATONIN TAB 3 MG 3 MG: 3 TAB at 01:23

## 2025-03-05 RX ADMIN — HEPARIN SODIUM 5000 UNITS: 5000 INJECTION INTRAVENOUS; SUBCUTANEOUS at 15:24

## 2025-03-05 RX ADMIN — METOPROLOL SUCCINATE 25 MG: 25 TABLET, EXTENDED RELEASE ORAL at 15:22

## 2025-03-05 RX ADMIN — ISOSORBIDE MONONITRATE 30 MG: 30 TABLET, EXTENDED RELEASE ORAL at 00:13

## 2025-03-05 RX ADMIN — ONDANSETRON 4 MG: 2 INJECTION INTRAMUSCULAR; INTRAVENOUS at 15:24

## 2025-03-05 RX ADMIN — ROPINIROLE HYDROCHLORIDE 0.25 MG: 0.25 TABLET, FILM COATED ORAL at 00:13

## 2025-03-05 ASSESSMENT — PAIN DESCRIPTION - PAIN TYPE: TYPE: CHRONIC PAIN

## 2025-03-05 ASSESSMENT — PAIN DESCRIPTION - LOCATION
LOCATION: FOOT
LOCATION: FOOT;BACK
LOCATION: FOOT
LOCATION: FOOT

## 2025-03-05 ASSESSMENT — PAIN DESCRIPTION - ONSET: ONSET: ON-GOING

## 2025-03-05 ASSESSMENT — PAIN DESCRIPTION - FREQUENCY: FREQUENCY: CONTINUOUS

## 2025-03-05 ASSESSMENT — PAIN SCALES - GENERAL
PAINLEVEL_OUTOF10: 7

## 2025-03-05 ASSESSMENT — PAIN DESCRIPTION - ORIENTATION
ORIENTATION: RIGHT;LEFT

## 2025-03-05 ASSESSMENT — PAIN DESCRIPTION - DESCRIPTORS
DESCRIPTORS: STABBING
DESCRIPTORS: ACHING

## 2025-03-05 ASSESSMENT — PAIN - FUNCTIONAL ASSESSMENT: PAIN_FUNCTIONAL_ASSESSMENT: PREVENTS OR INTERFERES SOME ACTIVE ACTIVITIES AND ADLS

## 2025-03-05 NOTE — ED NOTES
ED TO INPATIENT SBAR HANDOFF    Patient Name: Yovanny Levine   :  1975  49 y.o.   Preferred Name    Family/Caregiver Present no   Restraints no   C-SSRS: Risk of Suicide: No Risk  Sitter no   Sepsis Risk Score        Situation  Chief Complaint   Patient presents with    Chest Pain     Since yesterday. Was given 240 of asa     Brief Description of Patient's Condition: Pt presents to the the ed with shortness of breath and increased chest pressure for the past couple of days. Pt was admitted recently for excess fluid volume in the lung which needed to be drained. Pt is currently not experiencing any resp distress at this time. Pt is aaox4. Equal rise and fall of the chest.   Mental Status: oriented, alert, coherent, logical, thought processes intact, and able to concentrate and follow conversation  Arrived from: home    Imaging:   XR CHEST PORTABLE   Final Result        Abnormal labs:   Abnormal Labs Reviewed   CBC WITH AUTO DIFFERENTIAL - Abnormal; Notable for the following components:       Result Value    RBC 3.41 (*)     Hemoglobin 10.7 (*)     Hematocrit 33.8 (*)     MCH 31.4 (*)     MCHC 31.7 (*)     RDW 15.6 (*)     Neutrophils % 81 (*)     Lymphocytes % 7 (*)     Monocytes % 10 (*)     Immature Granulocytes % 1 (*)     All other components within normal limits   COMPREHENSIVE METABOLIC PANEL - Abnormal; Notable for the following components:    Potassium 5.4 (*)     Chloride 95 (*)     Anion Gap 22 (*)     Glucose 140 (*)     BUN 66 (*)     Creatinine 6.7 (*)     Est, Glom Filt Rate 9 (*)     Calcium 8.2 (*)     All other components within normal limits   BRAIN NATRIURETIC PEPTIDE - Abnormal; Notable for the following components:    NT Pro-BNP >70,000 (*)     All other components within normal limits   TROPONIN - Abnormal; Notable for the following components:    Troponin, High Sensitivity 133 (*)     All other components within normal limits   TROPONIN - Abnormal; Notable for the following components:    SpO2: (!) 88% 96% 98%    Weight:       Height:           PO Status: Regular    O2 Flow Rate: O2 Device: Nasal cannula O2 Flow Rate (L/min): 2 L/min    Cardiac Rhythm: NSR    Last documented pain medication administered: Morphine    NIH Score: NIH       Active LDA's:   Peripheral IV Dorsal;Right Forearm (Active)       Pertinent or High Risk Medications/Drips: no   If Yes, please provide details:       Blood Product Administration: no  If Yes, please provide details:     Recommendation    Incomplete orders   Additional Comments:    If any further questions, please call Sending RN at     Electronically signed by: Electronically signed by Alfonzo Gomes RN on 3/5/2025 at 12:22 AM

## 2025-03-05 NOTE — PROGRESS NOTES
Patient did not want his dressing removed from his foot upon skin assessment.Patient stated the wound care nurses take care of it 3 times a week

## 2025-03-05 NOTE — CARE COORDINATION
Met with pt at bedside to initiate discharge planning. Introduced self and role of CM. Pt from home alone. Pt does outpatient HD on M,W,Fr. Pt uses transportation company to get to HD but has had trouble with the company the last two weeks which is why he missed one of his treatments. Pt states that  at dialysis has been working on this. CM encourages pt at this time to try to schedule transportation through his medicaid if possible and to give them at least 24 hours notice. Pt does not have anyone else that can provide transportation for him.     Pt has a son that is local that helps him with grocery shopping. Pt is independent in ADL's and uses a cane at home. Pt has all needed DME. Has PCP and insurance. Wears O2 at night that is provided by [employer]. Pt is active with [facility] for nursing services and would like to continue with them at discharge. Pt will need new home care order. Plan is home with [facility]. CM following.     03/05/25 8418   Service Assessment   Patient Orientation Alert and Oriented   Cognition Alert   History Provided By Patient   Primary Caregiver Self   Accompanied By/Relationship N/A   Support Systems Children;Home Care Staff   Patient's Healthcare Decision Maker is: Legal Next of Kin   PCP Verified by CM Yes   Last Visit to PCP Within last 6 months   Prior Functional Level Assistance with the following:;Shopping   Current Functional Level Independent in ADLs/IADLs   Can patient return to prior living arrangement Yes   Ability to make needs known: Good   Family able to assist with home care needs: No   Would you like for me to discuss the discharge plan with any other family members/significant others, and if so, who? No   Financial Resources Medicaid;Medicare   Community Resources Transportation   CM/SW Referral Other (see comment)  (Discharge planning assessment)   Social/Functional History   Lives With Alone   Type of Home House  (Duplex)   Home Layout One level   Bathroom Shower/Tub

## 2025-03-05 NOTE — PROGRESS NOTES
Patient Name: Yovanny Levine  Patient : 1975  MRN: [id number]     Acct: [id number]  Date of Admission: 3/4/2025  Room/Bed: 3111/3111-A  Code Status:  Full Code  Allergies:   Allergies   Allergen Reactions    Adhesive Tape Rash    Doxycycline Nausea And Vomiting    Reglan [Metoclopramide] Anxiety     Diagnosis:    Patient Active Problem List   Diagnosis    Hiatal hernia    Diabetes mellitus type 2, improved controlled    Diabetic neuropathy (HCC)    Panic attack    Anxiety associated with depression    Neck pain    DANIELA (obstructive sleep apnea)    Urinary frequency    Bilateral carpal tunnel syndrome- left worse than right    Hyperlipidemia with target LDL less than 100    Essential hypertension    Bronchitis    Osteomyelitis (HCC)    Abscess    Periumbilical hernia    Sepsis (HCC)    Cellulitis of left foot    Constipation    Intractable nausea and vomiting    Uncontrolled type 2 diabetes mellitus    Nausea and vomiting    Type 2 diabetes mellitus with right diabetic foot infection (HCC)    Acute renal failure (ARF)    Cellulitis    Cellulitis of left arm    Cellulitis, scrotum    Acute epididymitis    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HCC)    Recurrent major depressive disorder, in full remission    Generalized anxiety disorder    Necrotizing fasciitis (HCC)    Syncope    Right groin wound    Nephrotic syndrome    Generalized edema    Stage 3b chronic kidney disease (HCC)    Diabetic nephropathy associated with type 2 diabetes mellitus (HCC)    Hypoalbuminemia    Chronic kidney disease, stage IV (severe) ([facility])    FH: CAD (coronary artery disease)    ASCVD (arteriosclerotic cardiovascular disease)    Other proteinuria    Chest pain    Surgical wound dehiscence    CARMEN (acute kidney injury)    Anemia    Chest tightness    NSTEMI (non-ST elevated myocardial infarction) (HCC)    Diabetic foot ulcer with osteomyelitis (HCC)    ESRD (end stage renal disease) ([facility])    Problem with vascular access    Acute on chronic respiratory

## 2025-03-05 NOTE — CONSULTS
Nephrology Service Consultation      [address], [address]  [place] [address]  Phone: [phone]  Office Hours: 8:30AM - 4:30PM  Monday - Friday        MEDICAL DECISION MAKING and Recommendations   -Chest pain  -Elevated trop  -CAD and s/p recent coronary stents  -HTN  -Hypervolemia  -ESRD on HD MWF  -CKD-MBD    SUGGEST;  -HD today with 3.5kg UF and will plan another UF session tomorrow  -Will follow    Thank you            Patient Active Problem List    Diagnosis Date Noted    Volume overload 02/22/2023    Acute on chronic respiratory failure with hypoxia ([facility]) 02/10/2023    CAD (coronary artery disease) 02/10/2023    Problem with vascular access 11/26/2022    ESRD (end stage renal disease) ([facility]) 09/12/2022    Diabetic foot ulcer with osteomyelitis ([facility]) 09/01/2022    NSTEMI (non-ST elevated myocardial infarction) ([facility]) 08/24/2022    Chest tightness 08/22/2022    CARMEN (acute kidney injury) 07/25/2022    Anemia 07/25/2022    Recurrent major depressive disorder, in full remission 05/18/2021    Generalized anxiety disorder 05/18/2021    Hypervolemia 03/04/2025    Partial thickness burn of finger of left hand 02/27/2025    Diabetic foot infection ([facility]) 02/21/2025    Heart failure ([facility]) 02/17/2025    Abscess of foot 02/13/2025    Cellulitis of right foot 02/13/2025    Pre-ulcerative calluses 02/06/2025    Recurrent right pleural effusion 01/24/2025    Fluid overload 01/20/2025    Ulcer of abdomen wall, limited to breakdown of skin ([facility]) 01/16/2025    Hypervolemia associated with renal insufficiency 01/07/2025    Diabetic ulcer of right midfoot associated with type 2 diabetes mellitus, with fat layer exposed ([facility]) 12/30/2024    Long toenail 12/30/2024    Acute on chronic respiratory failure ([facility]) 12/16/2024    Type 2 diabetes mellitus with skin complication, with long-term current use of insulin ([facility]) 11/19/2024    Acute hypoxic respiratory failure ([facility]) 11/11/2024    Chronic refractory osteomyelitis  1 tablet by mouth daily   Yes Deejay Devine MD   oxyCODONE-acetaminophen (PERCOCET) 7.5-325 MG per tablet Take 1 tablet by mouth every 8 hours as needed for Pain for up to 30 days. Intended supply: 30 days Max Daily Amount: 3 tablets 2/13/25 3/15/25 Yes Shelly Rubalcava APRN - CNP   empagliflozin (JARDIANCE) 10 MG tablet Take 1 tablet by mouth daily 1/28/25  Yes Sandhya Calderon PA-C   hydrALAZINE (APRESOLINE) 50 MG tablet Take 0.5 tablets by mouth in the morning and at bedtime 1/27/25  Yes Sandhya Calderon PA-C   rOPINIRole (REQUIP) 0.25 MG tablet Take 1 tablet by mouth nightly 1/27/25  Yes Sandhya Calderon PA-C   pantoprazole (PROTONIX) 40 MG tablet Take 1 tablet by mouth daily 1/28/25  Yes Sandhya Calderon PA-C   isosorbide mononitrate (IMDUR) 30 MG extended release tablet Take 0.5 tablets by mouth daily  Patient taking differently: Take 1 tablet by mouth daily 1/27/25  Yes Sandhya Calderon PA-C   aspirin 81 MG chewable tablet Take 1 tablet by mouth daily 12/20/24  Yes Broderick Schafer MD   atorvastatin (LIPITOR) 40 MG tablet Take 1 tablet by mouth nightly 12/19/24  Yes Broderick Schafer MD   clopidogrel (PLAVIX) 75 MG tablet Take 1 tablet by mouth daily 12/20/24  Yes Broderick Schafer MD   calcium carbonate (TUMS) 500 MG chewable tablet Take 2 tablets by mouth 2 times daily   Yes Deejay Devine MD   calcitRIOL (ROCALTROL) 0.5 MCG capsule Take 1 capsule by mouth 2 times daily 9/3/24  Yes Milan Ramirez MD   insulin glargine (LANTUS SOLOSTAR) 100 UNIT/ML injection pen Inject 10 Units into the skin nightly  Patient taking differently: Inject 15 Units into the skin nightly 9/5/23  Yes Ashlie Brown MD   glucose 4 g chewable tablet Take 4 tablets by mouth as needed for Low blood sugar 12/19/24   Broderick Schafer MD   OXYGEN Inhale 3 L into the lungs at bedtime    Deejay Devine MD   Wound Cleansers (VASHE WOUND THERAPY) external solution During wound care 3/20/24   Dean Mcclendon MD   albuterol sulfate HFA

## 2025-03-05 NOTE — PROGRESS NOTES
V2.0  [facility] [place] Hospitalist Progress Note      Name:  Yovanny Levine /Age/Sex: 1975  (49 y.o. male)   MRN & CSN:  [id number] & [id number] Encounter Date/Time: 3/5/2025 11:31 AM EST    [facility] PCP: José Luis Izquierdo MD       Hospital Day: 2    Assessment and Plan:   Patient is a 49 y.o. male who presented with SOB.     # Volume overload  # ESRD on HD MWF  # HFrEF, compensated  - Reported worsening SOB and orthopnea over past 2 days. Oliguric, last HD Monday 3/3, full treatment. Last TTE in 2024 with LVEF of 40-45 with G3DD. Had multiple recent admission for similar issue.   - Clinically hypervolemic. Pro-BNP >70k. CXR with pulmonary congestion.   - ED provider discussed with nephrology, plan for HD with fluid removal today and likely ultrafiltration tomorrow  - Continue Calcitrol.      # CAD s/p PCI to pLAD in 2024  # Non-MI chronic troponin elevation  - Denied any typical CP.   - Initial Tn elevated, but stable compared to previous levels. ECG without acute ischemic changes.  - Continue ASA, Plavix, Lipitor, Toprol-XL and Imdur.     # Hx of VT in 2024  - Continue Toprol-XL.   - Monitor electrolytes.       # Chronic left foot osteomyelitis  - Following with wound care. Recently evaluated by ID, started on 2-week course of Zyvox, ending 3/10.  - Continue Zyvox.     # Chronic hypoxemic respiratory failure  - On 2-3L NC qhs.      # Essential hypertension  - Continue hydralazine and Toprol-XL.     # Mixed hyperlipidemia  - Continue Lipitor.     # T2DM with hyperglycemia  - Last A1c 5.7% in 2024, repeat pending.   - Continue Jardiance and insulin Lantus 10 u qhs with LCSI.     # PUD  - Continue PPI.     # RLS  - Continue Requip.      # Chronic pain  - On Percocet.     # Class II obesity  - BMI 38.4.    Medical Decision Making:  The following items were considered in medical decision making:  Discussion of patient care with other providers  Reviewed clinical lab tests if any  Reviewed  PRN  polyethylene glycol, 17 g, Daily PRN  acetaminophen, 650 mg, Q6H PRN   Or  acetaminophen, 650 mg, Q6H PRN        Labs      Recent Results (from the past 24 hour(s))   CBC with Auto Differential    Collection Time: 03/04/25  4:18 PM   Result Value Ref Range    WBC 6.3 4.0 - 10.5 k/uL    RBC 3.41 (L) 4.60 - 6.20 m/uL    Hemoglobin 10.7 (L) 13.5 - 18.0 g/dL    Hematocrit 33.8 (L) 42.0 - 52.0 %    MCV 99.1 78.0 - 100.0 fL    MCH 31.4 (H) 27.0 - 31.0 pg    MCHC 31.7 (L) 32.0 - 36.0 g/dL    RDW 15.6 (H) 11.7 - 14.9 %    Platelets 159 140 - 440 k/uL    MPV 9.9 7.5 - 11.1 fL    Neutrophils % 81 (H) 36 - 66 %    Lymphocytes % 7 (L) 24 - 44 %    Monocytes % 10 (H) 0 - 4 %    Eosinophils % 2 0.0 - 3.0 %    Basophils % 1 0 - 1 %    Immature Granulocytes % 1 (H) 0 %    Neutrophils Absolute 5.08 k/uL    Lymphocytes Absolute 0.44 k/uL    Monocytes Absolute 0.60 k/uL    Eosinophils Absolute 0.11 k/uL    Basophils Absolute 0.05 k/uL    Immature Granulocytes Absolute 0.03 k/uL   CMP    Collection Time: 03/04/25  4:18 PM   Result Value Ref Range    Sodium 137 136 - 145 mmol/L    Potassium 5.4 (H) 3.5 - 5.1 mmol/L    Chloride 95 (L) 99 - 110 mmol/L    CO2 21 21 - 32 mmol/L    Anion Gap 22 (H) 9 - 17 mmol/L    Glucose 140 (H) 74 - 99 mg/dL    BUN 66 (H) 7 - 20 mg/dL    Creatinine 6.7 (H) 0.9 - 1.3 mg/dL    Est, Glom Filt Rate 9 (L) >60 mL/min/1.73m2    Calcium 8.2 (L) 8.3 - 10.6 mg/dL    Total Protein 7.3 6.4 - 8.2 g/dL    Albumin 4.1 3.4 - 5.0 g/dL    Albumin/Globulin Ratio 1.3 1.1 - 2.2    Total Bilirubin 0.7 0.0 - 1.0 mg/dL    Alkaline Phosphatase 80 40 - 129 U/L    ALT 23 10 - 40 U/L    AST 37 15 - 37 U/L   Brain Natriuretic Peptide    Collection Time: 03/04/25  4:18 PM   Result Value Ref Range    NT Pro-BNP >70,000 (H) 0 - 125 pg/mL   Troponin Now and Q1Hr    Collection Time: 03/04/25  4:18 PM   Result Value Ref Range    Troponin, High Sensitivity 133 (HH) 0 - 22 ng/L   Magnesium    Collection Time: 03/04/25  4:18 PM   Result

## 2025-03-05 NOTE — CARE COORDINATION
Attempted to meet with pt at bedside to initiate discharge planning but pt not currently in room. CM to attempt to see pt later today.     1400: Pt still not in room for CM to be able to do assessment. CM will check back later.

## 2025-03-06 LAB
EKG ATRIAL RATE: 81 BPM
EKG DIAGNOSIS: NORMAL
EKG P AXIS: 42 DEGREES
EKG P-R INTERVAL: 188 MS
EKG Q-T INTERVAL: 412 MS
EKG QRS DURATION: 90 MS
EKG QTC CALCULATION (BAZETT): 478 MS
EKG R AXIS: 50 DEGREES
EKG T AXIS: 84 DEGREES
EKG VENTRICULAR RATE: 81 BPM
GLUCOSE BLD-MCNC: 129 MG/DL (ref 74–99)
GLUCOSE BLD-MCNC: 142 MG/DL (ref 74–99)
GLUCOSE BLD-MCNC: 161 MG/DL (ref 74–99)

## 2025-03-06 PROCEDURE — 6360000002 HC RX W HCPCS: Performed by: STUDENT IN AN ORGANIZED HEALTH CARE EDUCATION/TRAINING PROGRAM

## 2025-03-06 PROCEDURE — 82962 GLUCOSE BLOOD TEST: CPT

## 2025-03-06 PROCEDURE — 1200000000 HC SEMI PRIVATE

## 2025-03-06 PROCEDURE — 6370000000 HC RX 637 (ALT 250 FOR IP): Performed by: STUDENT IN AN ORGANIZED HEALTH CARE EDUCATION/TRAINING PROGRAM

## 2025-03-06 PROCEDURE — 94761 N-INVAS EAR/PLS OXIMETRY MLT: CPT

## 2025-03-06 PROCEDURE — 90935 HEMODIALYSIS ONE EVALUATION: CPT

## 2025-03-06 PROCEDURE — 2700000000 HC OXYGEN THERAPY PER DAY

## 2025-03-06 PROCEDURE — 6360000002 HC RX W HCPCS: Performed by: INTERNAL MEDICINE

## 2025-03-06 PROCEDURE — 5A1D70Z PERFORMANCE OF URINARY FILTRATION, INTERMITTENT, LESS THAN 6 HOURS PER DAY: ICD-10-PCS | Performed by: STUDENT IN AN ORGANIZED HEALTH CARE EDUCATION/TRAINING PROGRAM

## 2025-03-06 PROCEDURE — 2500000003 HC RX 250 WO HCPCS: Performed by: STUDENT IN AN ORGANIZED HEALTH CARE EDUCATION/TRAINING PROGRAM

## 2025-03-06 RX ORDER — HEPARIN SODIUM 1000 [USP'U]/ML
3000 INJECTION, SOLUTION INTRAVENOUS; SUBCUTANEOUS
Status: COMPLETED | OUTPATIENT
Start: 2025-03-06 | End: 2025-03-06

## 2025-03-06 RX ADMIN — INSULIN GLARGINE 10 UNITS: 100 INJECTION, SOLUTION SUBCUTANEOUS at 20:02

## 2025-03-06 RX ADMIN — HEPARIN SODIUM 3000 UNITS: 1000 INJECTION INTRAVENOUS; SUBCUTANEOUS at 07:46

## 2025-03-06 RX ADMIN — EMPAGLIFLOZIN 10 MG: 10 TABLET, FILM COATED ORAL at 12:30

## 2025-03-06 RX ADMIN — HEPARIN SODIUM 5000 UNITS: 5000 INJECTION INTRAVENOUS; SUBCUTANEOUS at 13:30

## 2025-03-06 RX ADMIN — CLOPIDOGREL BISULFATE 75 MG: 75 TABLET ORAL at 12:25

## 2025-03-06 RX ADMIN — HEPARIN SODIUM 5000 UNITS: 5000 INJECTION INTRAVENOUS; SUBCUTANEOUS at 06:26

## 2025-03-06 RX ADMIN — HEPARIN SODIUM 5000 UNITS: 5000 INJECTION INTRAVENOUS; SUBCUTANEOUS at 22:08

## 2025-03-06 RX ADMIN — CALCITRIOL 0.5 MCG: 0.5 CAPSULE, LIQUID FILLED ORAL at 20:01

## 2025-03-06 RX ADMIN — CALCIUM CARBONATE 1000 MG: 500 TABLET, CHEWABLE ORAL at 20:01

## 2025-03-06 RX ADMIN — METOPROLOL SUCCINATE 25 MG: 25 TABLET, EXTENDED RELEASE ORAL at 12:24

## 2025-03-06 RX ADMIN — OXYCODONE HYDROCHLORIDE AND ACETAMINOPHEN 1 TABLET: 7.5; 325 TABLET ORAL at 11:47

## 2025-03-06 RX ADMIN — CALCIUM CARBONATE 1000 MG: 500 TABLET, CHEWABLE ORAL at 12:24

## 2025-03-06 RX ADMIN — HYDRALAZINE HYDROCHLORIDE 25 MG: 25 TABLET ORAL at 12:25

## 2025-03-06 RX ADMIN — ROPINIROLE HYDROCHLORIDE 0.25 MG: 0.25 TABLET, FILM COATED ORAL at 20:02

## 2025-03-06 RX ADMIN — ISOSORBIDE MONONITRATE 30 MG: 30 TABLET, EXTENDED RELEASE ORAL at 12:24

## 2025-03-06 RX ADMIN — DIPHENHYDRAMINE HYDROCHLORIDE 25 MG: 50 INJECTION, SOLUTION INTRAMUSCULAR; INTRAVENOUS at 00:34

## 2025-03-06 RX ADMIN — OXYCODONE HYDROCHLORIDE AND ACETAMINOPHEN 1 TABLET: 7.5; 325 TABLET ORAL at 20:02

## 2025-03-06 RX ADMIN — PANTOPRAZOLE SODIUM 40 MG: 40 TABLET, DELAYED RELEASE ORAL at 06:27

## 2025-03-06 RX ADMIN — HYDRALAZINE HYDROCHLORIDE 25 MG: 25 TABLET ORAL at 20:01

## 2025-03-06 RX ADMIN — CALCITRIOL 0.5 MCG: 0.5 CAPSULE, LIQUID FILLED ORAL at 12:24

## 2025-03-06 RX ADMIN — OXYCODONE HYDROCHLORIDE AND ACETAMINOPHEN 1 TABLET: 7.5; 325 TABLET ORAL at 00:30

## 2025-03-06 RX ADMIN — ATORVASTATIN CALCIUM 40 MG: 40 TABLET, FILM COATED ORAL at 20:02

## 2025-03-06 RX ADMIN — SODIUM CHLORIDE, PRESERVATIVE FREE 10 ML: 5 INJECTION INTRAVENOUS at 12:44

## 2025-03-06 RX ADMIN — ASPIRIN 81 MG CHEWABLE TABLET 81 MG: 81 TABLET CHEWABLE at 12:24

## 2025-03-06 RX ADMIN — LINEZOLID 600 MG: 600 TABLET, FILM COATED ORAL at 12:29

## 2025-03-06 RX ADMIN — LINEZOLID 600 MG: 600 TABLET, FILM COATED ORAL at 20:07

## 2025-03-06 RX ADMIN — SODIUM CHLORIDE, PRESERVATIVE FREE 10 ML: 5 INJECTION INTRAVENOUS at 20:04

## 2025-03-06 RX ADMIN — DIPHENHYDRAMINE HYDROCHLORIDE 25 MG: 50 INJECTION, SOLUTION INTRAMUSCULAR; INTRAVENOUS at 20:01

## 2025-03-06 ASSESSMENT — PAIN SCALES - GENERAL
PAINLEVEL_OUTOF10: 8
PAINLEVEL_OUTOF10: 6
PAINLEVEL_OUTOF10: 8
PAINLEVEL_OUTOF10: 7

## 2025-03-06 ASSESSMENT — PAIN DESCRIPTION - ORIENTATION
ORIENTATION: RIGHT
ORIENTATION: RIGHT
ORIENTATION: RIGHT;LEFT
ORIENTATION: RIGHT;LEFT

## 2025-03-06 ASSESSMENT — PAIN DESCRIPTION - DESCRIPTORS
DESCRIPTORS: SHARP
DESCRIPTORS: SHARP

## 2025-03-06 ASSESSMENT — PAIN DESCRIPTION - LOCATION
LOCATION: FOOT
LOCATION: LEG
LOCATION: FOOT
LOCATION: FOOT

## 2025-03-06 NOTE — CARE COORDINATION
D/c plan is home alone with HC.  Denies any new d/c needs.  Notify CM if any new d/c needs arise. TE    .Upon discharge pt will be going home with [facility] home care  Please fax H/P, D/S.  AVS, order, and face sheet to [phone]  Please call [phone] and inform of discharge

## 2025-03-06 NOTE — PROGRESS NOTES
Pt tolerated a 3hr tx well. 3L of fluid was removed. Left avf was used for tx. Avf ran with no issued. Heparin was used during tx. Pt voiced no new needs. Pt returned to room via transport.  Tx overseen by magalie ribeiro  Patient Name: Yovanny Levine  Patient : 1975  MRN: [id number]     Acct: [id number]  Date of Admission: 3/4/2025  Room/Bed: 3111/3111-A  Code Status:  Full Code  Allergies:   Allergies   Allergen Reactions    Adhesive Tape Rash    Doxycycline Nausea And Vomiting    Reglan [Metoclopramide] Anxiety     Diagnosis:    Patient Active Problem List   Diagnosis    Hiatal hernia    Diabetes mellitus type 2, improved controlled    Diabetic neuropathy (HCC)    Panic attack    Anxiety associated with depression    Neck pain    DANIELA (obstructive sleep apnea)    Urinary frequency    Bilateral carpal tunnel syndrome- left worse than right    Hyperlipidemia with target LDL less than 100    Essential hypertension    Bronchitis    Osteomyelitis (HCC)    Abscess    Periumbilical hernia    Sepsis (HCC)    Cellulitis of left foot    Constipation    Intractable nausea and vomiting    Uncontrolled type 2 diabetes mellitus    Nausea and vomiting    Type 2 diabetes mellitus with right diabetic foot infection (HCC)    Acute renal failure (ARF)    Cellulitis    Cellulitis of left arm    Cellulitis, scrotum    Acute epididymitis    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HCC)    Recurrent major depressive disorder, in full remission    Generalized anxiety disorder    Necrotizing fasciitis (HCC)    Syncope    Right groin wound    Nephrotic syndrome    Generalized edema    Stage 3b chronic kidney disease (HCC)    Diabetic nephropathy associated with type 2 diabetes mellitus (HCC)    Hypoalbuminemia    Chronic kidney disease, stage IV (severe) (HCC)    FH: CAD (coronary artery disease)    ASCVD (arteriosclerotic cardiovascular disease)    Other proteinuria    Chest pain    Surgical wound dehiscence    CARMEN (acute kidney injury)    Anemia     [id number]  RO Machine Number: [id number]  Dialyzer Lot No.: [id number]  RO Machine Log Sheet Completed: Yes  Machine Alarm Self Test: Completed;Passed (03/06/25 0708)     Air Foam Detector: Tested, Proper Function, pH Reading  Extracorporeal Circuit Tested for Integrity: Yes  Machine Conductivity: 13.8  Manual Conductivity: 13.8     Bicarbonate Concentrate Lot No.: [id number]  Acid Concentrate Lot No.: [id number]  Manual Ph: 7.4  Bleach Test (Neg): Yes  Bath Temperature: 96.8 °F (36 °C)  Tubing Lot#: [id number]  Conductivity Meter Serial #: [id number]  All Connections Secure?: Yes  Venous Parameters Set?: Yes  Arterial Parameters Set?: Yes  Saline Line Double Clamped?: Yes  Air Foam Detector Engaged?: Yes  Machine Functioning Alarm Free? Yes  Prime Given: 200ml    Chlorine Testing - Before each treatment and every 4 hours:   Treatment  Treatment Number: 1  Time On: 0738  Time Off: 1110  Treatment Goal: 3.5 HOUR, 3.5L  Weight - Scale: 115.6 kg (254 lb 13.6 oz) (03/06/25 0410)  1st check: less than 0.1 ppm at: 0700 hours  2nd check: less than 0.1 ppm at: 1100 hours  3rd check: Not Applicable  (if greater than 0.1 ppm, then check every 30 minutes from secondary)    Access Flows and Pressures  Patient Vitals for the past 8 hrs:   Blood Flow Rate (ml/min) Ultrafiltration Rate (ml/hr) Ultrafiltration Removed (ml) Arterial Pressure (mmHg) Venous Pressure (mmHg) TMP DFR Comments Access Visible   03/06/25 0738 300 ml/min 1000 ml/hr 0 ml -80 mmHg 140 70 0 TX STARTED, PRIME GIVEN, LINES SECURE AND CALL LIGHT WITHIN REACH.  ULTRAFILTRATION ONLY Yes   03/06/25 0745 300 ml/min 1000 ml/hr 87 ml -80 mmHg 180 10 0 AWAKE AND TALKING Yes   03/06/25 0800 300 ml/min 1000 ml/hr 352 ml -100 mmHg 170 20 0 pt resting with no needs Yes   03/06/25 0815 300 ml/min 1000 ml/hr 600 ml -100 mmHg 170 10 0 lines secure Yes   03/06/25 0830 300 ml/min 1000 ml/hr 810 ml -110 mmHg 170 20 0 no needs Yes   03/06/25 0845 300 ml/min 1000 ml/hr 1134 ml -100 mmHg 170 10 0 pt  18

## 2025-03-06 NOTE — PROGRESS NOTES
Nephrology Progress Note        [address] [facility], [address]  [place] [address]  Phone: [phone]  Office Hours: 8:30AM - 4:30PM  Monday - Friday        3/6/2025 7:09 AM  Subjective:   Admit Date: 3/4/2025  PCP: José Luis Izquierdo MD  Interval History:   Eating breakfast  Doing ok  Diet: ADULT DIET; Regular; 3 carb choices (45 gm/meal); Low Sodium (2 gm); 1500 ml      Data:   Scheduled Meds:   heparin (porcine)  3,000 Units IntraVENous Once in dialysis    aspirin  81 mg Oral Daily    atorvastatin  40 mg Oral Nightly    calcitRIOL  0.5 mcg Oral BID    calcium carbonate  2 tablet Oral BID    clopidogrel  75 mg Oral Daily    empagliflozin  10 mg Oral Daily    hydrALAZINE  25 mg Oral BID    insulin glargine  10 Units SubCUTAneous Nightly    isosorbide mononitrate  30 mg Oral Daily    linezolid  600 mg Oral 2 times per day    metoprolol succinate  25 mg Oral Daily    pantoprazole  40 mg Oral Daily    rOPINIRole  0.25 mg Oral Nightly    insulin lispro  0-4 Units SubCUTAneous 4x Daily AC & HS    sodium chloride flush  5-40 mL IntraVENous 2 times per day    heparin (porcine)  5,000 Units SubCUTAneous 3 times per day     Continuous Infusions:   dextrose      sodium chloride       PRN Meds:diphenhydrAMINE, albuterol sulfate HFA, oxyCODONE-acetaminophen, glucose, dextrose bolus **OR** dextrose bolus, glucagon (rDNA), dextrose, sodium chloride flush, sodium chloride, ondansetron **OR** ondansetron, melatonin, polyethylene glycol, acetaminophen **OR** acetaminophen  I/O last 3 completed shifts:  In: 1500 [P.O.:490; I.V.:10]  Out: 4500   No intake/output data recorded.    Intake/Output Summary (Last 24 hours) at 3/6/2025 0709  Last data filed at 3/5/2025 2049  Gross per 24 hour   Intake 1500 ml   Output 4500 ml   Net -3000 ml       CBC:   Recent Labs     03/04/25  1618 03/05/25  0940   WBC 6.3 5.7   HGB 10.7* 9.2*    139*       BMP:    Recent Labs     03/04/25  1618 03/04/25  1816 03/04/25  3346

## 2025-03-07 LAB
GLUCOSE BLD-MCNC: 115 MG/DL (ref 74–99)
GLUCOSE BLD-MCNC: 133 MG/DL (ref 74–99)
GLUCOSE BLD-MCNC: 161 MG/DL (ref 74–99)
GLUCOSE BLD-MCNC: 174 MG/DL (ref 74–99)
GLUCOSE BLD-MCNC: 180 MG/DL (ref 74–99)

## 2025-03-07 PROCEDURE — 94761 N-INVAS EAR/PLS OXIMETRY MLT: CPT

## 2025-03-07 PROCEDURE — 6360000002 HC RX W HCPCS: Performed by: STUDENT IN AN ORGANIZED HEALTH CARE EDUCATION/TRAINING PROGRAM

## 2025-03-07 PROCEDURE — 90935 HEMODIALYSIS ONE EVALUATION: CPT

## 2025-03-07 PROCEDURE — 82962 GLUCOSE BLOOD TEST: CPT

## 2025-03-07 PROCEDURE — 2700000000 HC OXYGEN THERAPY PER DAY

## 2025-03-07 PROCEDURE — 6360000002 HC RX W HCPCS: Performed by: INTERNAL MEDICINE

## 2025-03-07 PROCEDURE — 6370000000 HC RX 637 (ALT 250 FOR IP): Performed by: STUDENT IN AN ORGANIZED HEALTH CARE EDUCATION/TRAINING PROGRAM

## 2025-03-07 PROCEDURE — 1200000000 HC SEMI PRIVATE

## 2025-03-07 PROCEDURE — 2500000003 HC RX 250 WO HCPCS: Performed by: STUDENT IN AN ORGANIZED HEALTH CARE EDUCATION/TRAINING PROGRAM

## 2025-03-07 RX ORDER — HEPARIN SODIUM 1000 [USP'U]/ML
3000 INJECTION, SOLUTION INTRAVENOUS; SUBCUTANEOUS
Status: COMPLETED | OUTPATIENT
Start: 2025-03-07 | End: 2025-03-07

## 2025-03-07 RX ORDER — FAMOTIDINE 20 MG/1
20 TABLET, FILM COATED ORAL ONCE
Status: COMPLETED | OUTPATIENT
Start: 2025-03-07 | End: 2025-03-07

## 2025-03-07 RX ADMIN — ATORVASTATIN CALCIUM 40 MG: 40 TABLET, FILM COATED ORAL at 21:20

## 2025-03-07 RX ADMIN — DIPHENHYDRAMINE HYDROCHLORIDE 25 MG: 50 INJECTION, SOLUTION INTRAMUSCULAR; INTRAVENOUS at 12:40

## 2025-03-07 RX ADMIN — LINEZOLID 600 MG: 600 TABLET, FILM COATED ORAL at 22:37

## 2025-03-07 RX ADMIN — FAMOTIDINE 20 MG: 20 TABLET, FILM COATED ORAL at 14:50

## 2025-03-07 RX ADMIN — HEPARIN SODIUM 5000 UNITS: 5000 INJECTION INTRAVENOUS; SUBCUTANEOUS at 14:50

## 2025-03-07 RX ADMIN — OXYCODONE HYDROCHLORIDE AND ACETAMINOPHEN 1 TABLET: 7.5; 325 TABLET ORAL at 12:40

## 2025-03-07 RX ADMIN — ROPINIROLE HYDROCHLORIDE 0.25 MG: 0.25 TABLET, FILM COATED ORAL at 21:20

## 2025-03-07 RX ADMIN — PANTOPRAZOLE SODIUM 40 MG: 40 TABLET, DELAYED RELEASE ORAL at 04:28

## 2025-03-07 RX ADMIN — CLOPIDOGREL BISULFATE 75 MG: 75 TABLET ORAL at 13:45

## 2025-03-07 RX ADMIN — ASPIRIN 81 MG CHEWABLE TABLET 81 MG: 81 TABLET CHEWABLE at 13:44

## 2025-03-07 RX ADMIN — HEPARIN SODIUM 5000 UNITS: 5000 INJECTION INTRAVENOUS; SUBCUTANEOUS at 04:28

## 2025-03-07 RX ADMIN — METOPROLOL SUCCINATE 25 MG: 25 TABLET, EXTENDED RELEASE ORAL at 13:44

## 2025-03-07 RX ADMIN — HEPARIN SODIUM 5000 UNITS: 5000 INJECTION INTRAVENOUS; SUBCUTANEOUS at 21:20

## 2025-03-07 RX ADMIN — OXYCODONE HYDROCHLORIDE AND ACETAMINOPHEN 1 TABLET: 7.5; 325 TABLET ORAL at 04:29

## 2025-03-07 RX ADMIN — CALCITRIOL 0.5 MCG: 0.5 CAPSULE, LIQUID FILLED ORAL at 13:42

## 2025-03-07 RX ADMIN — OXYCODONE HYDROCHLORIDE AND ACETAMINOPHEN 1 TABLET: 7.5; 325 TABLET ORAL at 21:20

## 2025-03-07 RX ADMIN — CALCITRIOL 0.5 MCG: 0.5 CAPSULE, LIQUID FILLED ORAL at 21:20

## 2025-03-07 RX ADMIN — SODIUM CHLORIDE, PRESERVATIVE FREE 10 ML: 5 INJECTION INTRAVENOUS at 13:48

## 2025-03-07 RX ADMIN — INSULIN GLARGINE 10 UNITS: 100 INJECTION, SOLUTION SUBCUTANEOUS at 21:20

## 2025-03-07 RX ADMIN — CALCIUM CARBONATE 1000 MG: 500 TABLET, CHEWABLE ORAL at 21:20

## 2025-03-07 RX ADMIN — MELATONIN TAB 3 MG 3 MG: 3 TAB at 21:23

## 2025-03-07 RX ADMIN — DIPHENHYDRAMINE HYDROCHLORIDE 25 MG: 50 INJECTION, SOLUTION INTRAMUSCULAR; INTRAVENOUS at 21:23

## 2025-03-07 RX ADMIN — CALCIUM CARBONATE 1000 MG: 500 TABLET, CHEWABLE ORAL at 13:47

## 2025-03-07 RX ADMIN — HEPARIN SODIUM 3000 UNITS: 1000 INJECTION INTRAVENOUS; SUBCUTANEOUS at 08:35

## 2025-03-07 RX ADMIN — LINEZOLID 600 MG: 600 TABLET, FILM COATED ORAL at 13:46

## 2025-03-07 RX ADMIN — SODIUM CHLORIDE, PRESERVATIVE FREE 10 ML: 5 INJECTION INTRAVENOUS at 21:22

## 2025-03-07 RX ADMIN — ISOSORBIDE MONONITRATE 30 MG: 30 TABLET, EXTENDED RELEASE ORAL at 13:42

## 2025-03-07 RX ADMIN — DIPHENHYDRAMINE HYDROCHLORIDE 25 MG: 50 INJECTION, SOLUTION INTRAMUSCULAR; INTRAVENOUS at 04:28

## 2025-03-07 RX ADMIN — EMPAGLIFLOZIN 10 MG: 10 TABLET, FILM COATED ORAL at 13:44

## 2025-03-07 RX ADMIN — HYDRALAZINE HYDROCHLORIDE 25 MG: 25 TABLET ORAL at 13:45

## 2025-03-07 RX ADMIN — HYDRALAZINE HYDROCHLORIDE 25 MG: 25 TABLET ORAL at 21:20

## 2025-03-07 ASSESSMENT — PAIN SCALES - WONG BAKER: WONGBAKER_NUMERICALRESPONSE: HURTS A LITTLE BIT

## 2025-03-07 ASSESSMENT — PAIN SCALES - GENERAL
PAINLEVEL_OUTOF10: 7
PAINLEVEL_OUTOF10: 6
PAINLEVEL_OUTOF10: 8
PAINLEVEL_OUTOF10: 0
PAINLEVEL_OUTOF10: 6
PAINLEVEL_OUTOF10: 7

## 2025-03-07 ASSESSMENT — PAIN DESCRIPTION - ORIENTATION
ORIENTATION: RIGHT
ORIENTATION: RIGHT
ORIENTATION: RIGHT;LEFT

## 2025-03-07 ASSESSMENT — PAIN DESCRIPTION - LOCATION
LOCATION: FOOT
LOCATION: FOOT
LOCATION: FOOT;LEG

## 2025-03-07 ASSESSMENT — PAIN DESCRIPTION - DESCRIPTORS
DESCRIPTORS: ACHING;CRAMPING
DESCRIPTORS: SHARP

## 2025-03-07 NOTE — PROGRESS NOTES
Nephrology Progress Note        [address] [facility], [address] [place] [address]  Phone: [phone]  Office Hours: 8:30AM - 4:30PM  Monday - Friday        3/7/2025 6:37 AM  Subjective:   Admit Date: 3/4/2025  PCP: José Luis Izquierdo MD  Interval History:   On NC  Eager to go home    Diet: ADULT DIET; Regular; 3 carb choices (45 gm/meal); Low Sodium (2 gm); 1500 ml      Data:   Scheduled Meds:   heparin (porcine)  3,000 Units IntraVENous Once in dialysis    aspirin  81 mg Oral Daily    atorvastatin  40 mg Oral Nightly    calcitRIOL  0.5 mcg Oral BID    calcium carbonate  2 tablet Oral BID    clopidogrel  75 mg Oral Daily    empagliflozin  10 mg Oral Daily    hydrALAZINE  25 mg Oral BID    insulin glargine  10 Units SubCUTAneous Nightly    isosorbide mononitrate  30 mg Oral Daily    linezolid  600 mg Oral 2 times per day    metoprolol succinate  25 mg Oral Daily    pantoprazole  40 mg Oral Daily    rOPINIRole  0.25 mg Oral Nightly    insulin lispro  0-4 Units SubCUTAneous 4x Daily AC & HS    sodium chloride flush  5-40 mL IntraVENous 2 times per day    heparin (porcine)  5,000 Units SubCUTAneous 3 times per day     Continuous Infusions:   dextrose      sodium chloride       PRN Meds:diphenhydrAMINE, albuterol sulfate HFA, oxyCODONE-acetaminophen, glucose, dextrose bolus **OR** dextrose bolus, glucagon (rDNA), dextrose, sodium chloride flush, sodium chloride, ondansetron **OR** ondansetron, melatonin, polyethylene glycol, acetaminophen **OR** acetaminophen  I/O last 3 completed shifts:  In: 2716 [P.O.:1206; I.V.:10]  Out: 8000   I/O this shift:  In: 625 [P.O.:625]  Out: 25 [Urine:25]    Intake/Output Summary (Last 24 hours) at 3/7/2025 0637  Last data filed at 3/7/2025 0500  Gross per 24 hour   Intake 1841 ml   Output 3525 ml   Net -1684 ml       CBC:   Recent Labs     03/04/25  1618 03/05/25  0940   WBC 6.3 5.7   HGB 10.7* 9.2*    139*       BMP:    Recent Labs     03/04/25  1618 03/04/25  1818  03/04/25  2356 03/05/25  0822     --   --  136   K 5.4* 4.5  --  5.1   CL 95*  --   --  94*   CO2 21  --   --  26   BUN 66*  --   --  74*   CREATININE 6.7*  --   --  6.9*   GLUCOSE 140*  --  164 123*   CALCIUM 8.2*  --   --  7.8*     Hepatic:   Recent Labs     03/04/25  1618   AST 37   ALT 23   BILITOT 0.7   ALKPHOS 80         Objective:   Vitals: BP (!) 140/87   Pulse 69   Temp 98.5 °F (36.9 °C) (Oral)   Resp 18   Ht 1.753 m (5' 9\")   Wt 116.3 kg (256 lb 6.4 oz)   SpO2 97%   BMI 37.86 kg/m²   General appearance: alert and cooperative with exam, in no acute distress  HEENT: normocephalic, atraumatic,   Neck: supple, trachea midline  Lungs:breathing comfortably on nc  Extremities: legs are wrapped  Neurologic: alert, oriented, follows commands, interactive    MEDICAL DECISION MAKING     -Chest pain  -Elevated trop  -CAD and s/p recent coronary stents  -HTN  -Hypervolemia  -ESRD on HD MWF  -CKD-MBD     SUGGEST;  -HD today with 3L UF if BP allows//ok to dc after HD as long as doing ok  -High chance of readmission since there is no outpt transportation to HD set up  -Will follow                  Electronically signed by Melody James DO on 3/7/2025 at 6:37 AM    [facility]  MD SAMANTHA SMITH DO  [address] [facility] [address],  [address]  [place] [address]  PHONE: [phone]  FAX: [phone]

## 2025-03-07 NOTE — PROGRESS NOTES
V2.0  [facility] Hospitalist Progress Note      Name:  Yovanny Levine /Age/Sex: 1975  (49 y.o. male)   MRN & CSN:  [id number] & [id number] Encounter Date/Time: 3/6/2025 11:31 AM EST    [facility]CP: José Luis Izquierdo MD       Hospital Day: 3    Assessment and Plan:   Patient is a 49 y.o. male who presented with SOB.     # Volume overload  # ESRD on HD MWF  # HFrEF, compensated  - Reported worsening SOB and orthopnea over past 2 days. Oliguric, last HD Monday 3/3, full treatment. Last TTE in 2024 with LVEF of 40-45 with G3DD. Had multiple recent admission for similar issue.   - Clinically hypervolemic. Pro-BNP >70k. CXR with pulmonary congestion.   - ED provider discussed with nephrology, plan for  likely ultrafiltration today and DC tomorrow  - Continue Calcitrol.      # CAD s/p PCI to pLAD in 2024  # Non-MI chronic troponin elevation  - Denied any typical CP.   - Initial Tn elevated, but stable compared to previous levels. ECG without acute ischemic changes.  - Continue ASA, Plavix, Lipitor, Toprol-XL and Imdur.     # Hx of VT in 2024  - Continue Toprol-XL.   - Monitor electrolytes.       # Chronic left foot osteomyelitis  - Following with wound care. Recently evaluated by ID, started on 2-week course of Zyvox, ending 3/10.  - Continue Zyvox.     # Chronic hypoxemic respiratory failure  - On 2-3L NC qhs.      # Essential hypertension  - Continue hydralazine and Toprol-XL.     # Mixed hyperlipidemia  - Continue Lipitor.     # T2DM with hyperglycemia  - Last A1c 5.7% in 2024, repeat pending.   - Continue Jardiance and insulin Lantus 10 u qhs with LCSI.     # PUD  - Continue PPI.     # RLS  - Continue Requip.      # Chronic pain  - On Percocet.     # Class II obesity  - BMI 38.4.    Medical Decision Making:  The following items were considered in medical decision making:  Discussion of patient care with other providers  Reviewed clinical lab tests if any  Reviewed radiology tests if

## 2025-03-07 NOTE — PROGRESS NOTES
V2.0  [facility] [place] [facility] Hospitalist Progress Note      Name:  Yovanny Levine /Age/Sex: 1975  (49 y.o. male)   MRN & CSN:  [id number] & [id number] Encounter Date/Time: 3/7/2025 11:31 AM EST    [facility] PCP: José Luis Izquierdo MD       Hospital Day: 4    Assessment and Plan:   Patient is a 49 y.o. male who presented with SOB.     # Volume overload  # ESRD on HD MWF  # HFrEF, compensated  - Reported worsening SOB and orthopnea over past 2 days. Oliguric, last HD Monday 3/3, full treatment. Last TTE in 2024 with LVEF of 40-45 with G3DD. Had multiple recent admission for similar issue.   - Clinically hypervolemic. Pro-BNP >70k. CXR with pulmonary congestion.   - ED provider discussed with nephrology, plan for  likely ultrafiltration today and DC tomorrow  - Continue Calcitrol.      # CAD s/p PCI to pLAD in 2024  # Non-MI chronic troponin elevation  - Denied any typical CP.   - Initial Tn elevated, but stable compared to previous levels. ECG without acute ischemic changes.  - Continue ASA, Plavix, Lipitor, Toprol-XL and Imdur.     # Hx of VT in 2024  - Continue Toprol-XL.   - Monitor electrolytes.       # Chronic left foot osteomyelitis  - Following with wound care. Recently evaluated by ID, started on 2-week course of Zyvox, ending 3/10.  - Continue Zyvox.     # Chronic hypoxemic respiratory failure  - On 2-3L NC qhs.      # Essential hypertension  - Continue hydralazine and Toprol-XL.     # Mixed hyperlipidemia  - Continue Lipitor.     # T2DM with hyperglycemia  - Last A1c 5.7% in 2024, repeat pending.   - Continue Jardiance and insulin Lantus 10 u qhs with LCSI.     # PUD  - Continue PPI.     # RLS  - Continue Requip.      # Chronic pain  - On Percocet.     # Class II obesity  - BMI 38.4.    Comment: Please note this report has been produced using speech recognition software and may contain errors related to that system including errors in grammar, punctuation, and spelling, as well as words and

## 2025-03-07 NOTE — PROGRESS NOTES
Patient Name: Yovanny Levine  Patient : 1975  MRN: [id number]     Acct: [id number]  Date of Admission: 3/4/2025  Room/Bed: 3111/3111-A  Code Status:  Full Code  Allergies:   Allergies   Allergen Reactions    Adhesive Tape Rash    Doxycycline Nausea And Vomiting    Reglan [Metoclopramide] Anxiety     Diagnosis:    Patient Active Problem List   Diagnosis    Hiatal hernia    Diabetes mellitus type 2, improved controlled    Diabetic neuropathy (HCC)    Panic attack    Anxiety associated with depression    Neck pain    DANIELA (obstructive sleep apnea)    Urinary frequency    Bilateral carpal tunnel syndrome- left worse than right    Hyperlipidemia with target LDL less than 100    Essential hypertension    Bronchitis    Osteomyelitis (HCC)    Abscess    Periumbilical hernia    Sepsis (HCC)    Cellulitis of left foot    Constipation    Intractable nausea and vomiting    Uncontrolled type 2 diabetes mellitus    Nausea and vomiting    Type 2 diabetes mellitus with right diabetic foot infection (HCC)    Acute renal failure (ARF)    Cellulitis    Cellulitis of left arm    Cellulitis, scrotum    Acute epididymitis    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HCC)    Recurrent major depressive disorder, in full remission    Generalized anxiety disorder    Necrotizing fasciitis (HCC)    Syncope    Right groin wound    Nephrotic syndrome    Generalized edema    Stage 3b chronic kidney disease (HCC)    Diabetic nephropathy associated with type 2 diabetes mellitus (HCC)    Hypoalbuminemia    Chronic kidney disease, stage IV (severe) ([facility])    FH: CAD (coronary artery disease)    ASCVD (arteriosclerotic cardiovascular disease)    Other proteinuria    Chest pain    Surgical wound dehiscence    CARMEN (acute kidney injury)    Anemia    Chest tightness    NSTEMI (non-ST elevated myocardial infarction) (HCC)    Diabetic foot ulcer with osteomyelitis (HCC)    ESRD (end stage renal disease) ([facility])    Problem with vascular access    Acute on chronic respiratory  124/76 -- 67 12 -- -- -- --   03/07/25 1130 138/72 -- 67 13 -- -- -- --   03/07/25 1145 (!) 141/79 -- 68 20 -- -- -- --   03/07/25 1200 93/69 -- 67 13 -- -- -- --   03/07/25 1211 (!) 101/57 -- 69 21 -- -- -- --   03/07/25 1215 107/73 97.7 °F (36.5 °C) 68 17 97 % -- -- --     Post-Dialysis  Arterial Catheter Locking Solution: Not Applicable  Venous Catheter Locking Solution: Not Applicable  Post-Treatment Procedures: Blood returned, Access bleeding time < 10 minutes  Machine Disinfection Process: Exterior Machine Disinfection  Rinseback Volume (ml): 300 ml  Blood Volume Processed (Liters): 70 L  Dialyzer Clearance: Lightly streaked  Duration of Treatment (minutes): 210 minutes     Hemodialysis Intake (ml): 500 ml  Hemodialysis Output (ml): 3500 ml     Tolerated Treatment: Good  Patient Response to Treatment: well  Physician Notified: No       Provider Notification        Handoff complete and report given to Primary RN at 1229 hours.  Primary RN (First Initial, Last Name, Title):  DASHAWN Tyson RN     Education  Person Educated: Patient   Knowledge Base: Minimal  Barriers to Learning?: None  Preferred method of Learning: Oral  Topic(s): Access Care, Signs and Symptoms of Infection, Fluid Management, Procedural, and Medications   Teaching Tools: Explanation   Response to Education: Verbalized Understanding     Electronically signed by Marina Jenkins RN on 3/7/2025 at 12:30 PM

## 2025-03-07 NOTE — CONSULTS
03/05/2025 08:22 AM    K 5.1 03/05/2025 08:22 AM    CL 94 03/05/2025 08:22 AM    CO2 26 03/05/2025 08:22 AM    BUN 74 03/05/2025 08:22 AM    CREATININE 6.9 03/05/2025 08:22 AM    GFRAA 10 10/03/2022 08:00 AM    LABGLOM 9 03/05/2025 08:22 AM    LABGLOM 9 04/27/2024 02:00 AM    GLUCOSE 123 03/05/2025 08:22 AM    LABALBU 48 03/27/2021 06:54 PM    CALCIUM 7.8 03/05/2025 08:22 AM    BILITOT 0.7 03/04/2025 04:18 PM    ALKPHOS 80 03/04/2025 04:18 PM    AST 37 03/04/2025 04:18 PM    ALT 23 03/04/2025 04:18 PM     Albumin:    Lab Results   Component Value Date/Time    LABALBU 48 03/27/2021 06:54 PM     PT/INR:    Lab Results   Component Value Date/Time    PROTIME 17.4 01/07/2025 05:03 AM    PROTIME 12.5 12/13/2010 01:12 PM    INR 1.4 01/07/2025 05:03 AM     HgBA1c:    Lab Results   Component Value Date/Time    LABA1C 5.8 03/05/2025 09:40 AM         Assessment:     Patient Active Problem List   Diagnosis    Hiatal hernia    Diabetes mellitus type 2, improved controlled    Diabetic neuropathy (HCC)    Panic attack    Anxiety associated with depression    Neck pain    DANIELA (obstructive sleep apnea)    Urinary frequency    Bilateral carpal tunnel syndrome- left worse than right    Hyperlipidemia with target LDL less than 100    Essential hypertension    Bronchitis    Osteomyelitis (HCC)    Abscess    Periumbilical hernia    Sepsis (HCC)    Cellulitis of left foot    Constipation    Intractable nausea and vomiting    Uncontrolled type 2 diabetes mellitus    Nausea and vomiting    Type 2 diabetes mellitus with right diabetic foot infection (HCC)    Acute renal failure (ARF)    Cellulitis    Cellulitis of left arm    Cellulitis, scrotum    Acute epididymitis    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HCC)    Recurrent major depressive disorder, in full remission    Generalized anxiety disorder    Necrotizing fasciitis (HCC)    Syncope    Right groin wound    Nephrotic syndrome    Generalized edema    Stage 3b chronic kidney disease (HCC)    Diabetic  1040   Distance Tunneling (cm) 0 cm 03/07/25 1433   Tunneling Position ___ O'Clock 0 03/07/25 1433   Undermining Starts ___ O'Clock 0 03/07/25 1433   Undermining Ends___ O'Clock 0 03/07/25 1433   Undermining Maxium Distance (cm) 0 03/07/25 1433   Wound Assessment Dry 03/07/25 1433   Drainage Amount None (dry) 03/07/25 1433   Odor None 03/07/25 1433   Shazia-wound Assessment Hyperkeratosis (callous) 03/07/25 1433   Margins Defined edges 03/07/25 1433   Wound Thickness Description not for Pressure Injury Full thickness 03/07/25 1433   Number of days: 17       Response to treatment:  Well tolerated by patient.     Pain Assessment:  Severity:  none  Quality of pain: none  Wound Pain Timing/Severity: none  Premedicated: none    Plan:     Plan of Care: [REMOVED] Wound 03/04/25 Foot Right-Dressing/Treatment: Pressure dressing  Wound 02/18/25 Finger (Comment which one) Left 3rd middle-Dressing/Treatment: Silicone border  Wound 02/13/25 Right;Plantar #2-Dressing/Treatment: ABD, Roll gauze  Wound 01/07/25 Abdomen Left;Lower #1-Dressing/Treatment: Silicone border    Alert and agreeable to wound assessment. Right lateral foot diabetic wound, callous covered. Cleaned with betadine and covered with ABD and wrapped with kerlix. Left abdominal wound cleaned with betadine and covered with silicone border. Stated he burned area when cooking and has difficulty no picking at area. Left 3rd finger is also burn from cooking. Cleaned with betadine and covered with silicone border. Sacrum not viewed pt stated that there is no problem with area. Heels intact and no other areas of concern at this time.    Specialty Bed Required : no  [] Low Air Loss   [] Pressure Redistribution  [] Fluid Immersion  [] Bariatric  [] Total Pressure Relief  [] Other:     Discharge Plan:  Placement for patient upon discharge: to be determined  Hospice Care: no  Patient appropriate for Outpatient Wound Care Center: currently a pt     Patient/Caregiver

## 2025-03-08 VITALS
RESPIRATION RATE: 27 BRPM | HEIGHT: 69 IN | DIASTOLIC BLOOD PRESSURE: 81 MMHG | HEART RATE: 71 BPM | WEIGHT: 256.4 LBS | BODY MASS INDEX: 37.98 KG/M2 | TEMPERATURE: 97.7 F | SYSTOLIC BLOOD PRESSURE: 147 MMHG | OXYGEN SATURATION: 95 %

## 2025-03-08 LAB
ANION GAP SERPL CALCULATED.3IONS-SCNC: 16 MMOL/L (ref 9–17)
BUN SERPL-MCNC: 67 MG/DL (ref 7–20)
CALCIUM SERPL-MCNC: 8.2 MG/DL (ref 8.3–10.6)
CHLORIDE SERPL-SCNC: 97 MMOL/L (ref 99–110)
CO2 SERPL-SCNC: 23 MMOL/L (ref 21–32)
CREAT SERPL-MCNC: 6.2 MG/DL (ref 0.9–1.3)
GFR, ESTIMATED: 10 ML/MIN/1.73M2
GLUCOSE BLD-MCNC: 111 MG/DL (ref 74–99)
GLUCOSE BLD-MCNC: 123 MG/DL (ref 74–99)
GLUCOSE SERPL-MCNC: 210 MG/DL (ref 74–99)
POTASSIUM SERPL-SCNC: 5.4 MMOL/L (ref 3.5–5.1)
SODIUM SERPL-SCNC: 136 MMOL/L (ref 136–145)

## 2025-03-08 PROCEDURE — 6360000002 HC RX W HCPCS: Performed by: INTERNAL MEDICINE

## 2025-03-08 PROCEDURE — 80048 BASIC METABOLIC PNL TOTAL CA: CPT

## 2025-03-08 PROCEDURE — 6370000000 HC RX 637 (ALT 250 FOR IP): Performed by: STUDENT IN AN ORGANIZED HEALTH CARE EDUCATION/TRAINING PROGRAM

## 2025-03-08 PROCEDURE — 2500000003 HC RX 250 WO HCPCS: Performed by: STUDENT IN AN ORGANIZED HEALTH CARE EDUCATION/TRAINING PROGRAM

## 2025-03-08 PROCEDURE — 6360000002 HC RX W HCPCS: Performed by: STUDENT IN AN ORGANIZED HEALTH CARE EDUCATION/TRAINING PROGRAM

## 2025-03-08 PROCEDURE — 90935 HEMODIALYSIS ONE EVALUATION: CPT

## 2025-03-08 PROCEDURE — 82962 GLUCOSE BLOOD TEST: CPT

## 2025-03-08 RX ORDER — HEPARIN SODIUM 1000 [USP'U]/ML
2400 INJECTION, SOLUTION INTRAVENOUS; SUBCUTANEOUS
Status: COMPLETED | OUTPATIENT
Start: 2025-03-08 | End: 2025-03-08

## 2025-03-08 RX ADMIN — SODIUM CHLORIDE, PRESERVATIVE FREE 10 ML: 5 INJECTION INTRAVENOUS at 12:54

## 2025-03-08 RX ADMIN — ASPIRIN 81 MG CHEWABLE TABLET 81 MG: 81 TABLET CHEWABLE at 12:53

## 2025-03-08 RX ADMIN — LINEZOLID 600 MG: 600 TABLET, FILM COATED ORAL at 12:56

## 2025-03-08 RX ADMIN — PANTOPRAZOLE SODIUM 40 MG: 40 TABLET, DELAYED RELEASE ORAL at 05:39

## 2025-03-08 RX ADMIN — HEPARIN SODIUM 2400 UNITS: 1000 INJECTION INTRAVENOUS; SUBCUTANEOUS at 09:24

## 2025-03-08 RX ADMIN — ONDANSETRON 4 MG: 2 INJECTION INTRAMUSCULAR; INTRAVENOUS at 08:07

## 2025-03-08 RX ADMIN — HYDRALAZINE HYDROCHLORIDE 25 MG: 25 TABLET ORAL at 12:53

## 2025-03-08 RX ADMIN — ISOSORBIDE MONONITRATE 30 MG: 30 TABLET, EXTENDED RELEASE ORAL at 12:53

## 2025-03-08 RX ADMIN — OXYCODONE HYDROCHLORIDE AND ACETAMINOPHEN 1 TABLET: 7.5; 325 TABLET ORAL at 12:56

## 2025-03-08 RX ADMIN — HEPARIN SODIUM 5000 UNITS: 5000 INJECTION INTRAVENOUS; SUBCUTANEOUS at 05:39

## 2025-03-08 RX ADMIN — CLOPIDOGREL BISULFATE 75 MG: 75 TABLET ORAL at 12:53

## 2025-03-08 RX ADMIN — CALCITRIOL 0.5 MCG: 0.5 CAPSULE, LIQUID FILLED ORAL at 12:53

## 2025-03-08 RX ADMIN — EMPAGLIFLOZIN 10 MG: 10 TABLET, FILM COATED ORAL at 12:53

## 2025-03-08 RX ADMIN — METOPROLOL SUCCINATE 25 MG: 25 TABLET, EXTENDED RELEASE ORAL at 12:53

## 2025-03-08 RX ADMIN — CALCIUM CARBONATE 1000 MG: 500 TABLET, CHEWABLE ORAL at 12:53

## 2025-03-08 ASSESSMENT — PAIN DESCRIPTION - PAIN TYPE: TYPE: ACUTE PAIN

## 2025-03-08 ASSESSMENT — PAIN DESCRIPTION - ONSET: ONSET: ON-GOING

## 2025-03-08 ASSESSMENT — PAIN SCALES - GENERAL
PAINLEVEL_OUTOF10: 6
PAINLEVEL_OUTOF10: 3
PAINLEVEL_OUTOF10: 7
PAINLEVEL_OUTOF10: 7

## 2025-03-08 ASSESSMENT — PAIN DESCRIPTION - FREQUENCY: FREQUENCY: CONTINUOUS

## 2025-03-08 ASSESSMENT — PAIN - FUNCTIONAL ASSESSMENT: PAIN_FUNCTIONAL_ASSESSMENT: ACTIVITIES ARE NOT PREVENTED

## 2025-03-08 ASSESSMENT — PAIN DESCRIPTION - LOCATION
LOCATION: LEG
LOCATION: FOOT;LEG
LOCATION: FOOT;LEG

## 2025-03-08 ASSESSMENT — PAIN DESCRIPTION - ORIENTATION
ORIENTATION: RIGHT
ORIENTATION: RIGHT;LEFT

## 2025-03-08 ASSESSMENT — PAIN DESCRIPTION - DESCRIPTORS
DESCRIPTORS: ACHING
DESCRIPTORS: ACHING;SORE;DISCOMFORT

## 2025-03-08 NOTE — DISCHARGE INSTRUCTIONS
Please take your Medications regularly and set up appointments to follow up with your Primary Care Physician & nephrologist soon within 1 week    If having any new, persistent or worsening symptoms including but not limited to chest pain, shorness of breath, loss of consciousness, palpitations , chest or abdomen pressure, left arm pain or pressure, severe headache, especially with new weakness/numbness or tingling in any part of body, any gait or balance issues, any fever/ chill, cough, sputum,  buring with urine , any bleeding or dark stools etc  ,please return to nearest facility for evaluation    Please check fingerstick glucose before meals and at bedtime.  Maintain hypoglycemia protocol as per reading material provided. If fingerstick glucose is less than 150, please hold the Lantus dose.  Fingerstick glucose are less than 110s, may need to hold oral diabetic medicine.  If having hypoglycemia symptoms and fingerstick glucose readings are less than 100, you may need to take juices/glucose tablets until your blood sugar improves to > 100mg/dl and symptoms resolve . If having hypoglycemia and symptoms of drowsiness, or lethargic, you/patient may need glucagon injection to be administered as per written material or be brought to the Emergency Department for further management.  In case of glucose reading greater than 300s or less than 100s and having any symptoms, you/patient need to be evaluated by physician    Please check you weight daily. If your weight increases more than 5 pounds in 1 week, please see your cardiologist. Limit daily salt intake to 2gram per day and consume no more than 4-6 glasses fluids in one day. Take your water pill/ regularly    Please go through Printed reading material and feel free to reach out in case of any queries, concerns or issues per contact provided

## 2025-03-08 NOTE — PROGRESS NOTES
Patient Name: Yovanny Levine  Patient : 1975  MRN: [id number]     Acct: [id number]  Date of Admission: 3/4/2025  Room/Bed: 3111/3111-A  Code Status:  Full Code  Allergies:   Allergies   Allergen Reactions    Adhesive Tape Rash    Doxycycline Nausea And Vomiting    Reglan [Metoclopramide] Anxiety     Diagnosis:    Patient Active Problem List   Diagnosis    Hiatal hernia    Diabetes mellitus type 2, improved controlled    Diabetic neuropathy (HCC)    Panic attack    Anxiety associated with depression    Neck pain    DANIELA (obstructive sleep apnea)    Urinary frequency    Bilateral carpal tunnel syndrome- left worse than right    Hyperlipidemia with target LDL less than 100    Essential hypertension    Bronchitis    Osteomyelitis (HCC)    Abscess    Periumbilical hernia    Sepsis (HCC)    Cellulitis of left foot    Constipation    Intractable nausea and vomiting    Uncontrolled type 2 diabetes mellitus    Nausea and vomiting    Type 2 diabetes mellitus with right diabetic foot infection (HCC)    Acute renal failure (ARF)    Cellulitis    Cellulitis of left arm    Cellulitis, scrotum    Acute epididymitis    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HCC)    Recurrent major depressive disorder, in full remission    Generalized anxiety disorder    Necrotizing fasciitis (HCC)    Syncope    Right groin wound    Nephrotic syndrome    Generalized edema    Stage 3b chronic kidney disease (HCC)    Diabetic nephropathy associated with type 2 diabetes mellitus (HCC)    Hypoalbuminemia    Chronic kidney disease, stage IV (severe) ([facility])    FH: CAD (coronary artery disease)    ASCVD (arteriosclerotic cardiovascular disease)    Other proteinuria    Chest pain    Surgical wound dehiscence    CARMEN (acute kidney injury)    Anemia    Chest tightness    NSTEMI (non-ST elevated myocardial infarction) (HCC)    Diabetic foot ulcer with osteomyelitis (HCC)    ESRD (end stage renal disease) ([facility])    Problem with vascular access    Acute on chronic respiratory

## 2025-03-08 NOTE — DISCHARGE SUMMARY
V2.0  Discharge Summary    Name:  Yovanny Levine /Age/Sex: 1975 (49 y.o. male)   Admit Date: 3/4/2025  Discharge Date: 3/8/25    MRN & CSN:  [id number] & [id number] Encounter Date and Time 3/8/25 12:06 PM EST    Attending:  Broderick Schafer MD Discharging Provider: Broderick Schafer MD       Hospital Course:     Brief HPI: As per Admitting, \" Patient is a 49 y.o. male with a PMHx as above who presented to the ED with worsening SOB and orthopnea over past 2 days. Oliguric, last HD Monday 3/3, full treatment. Denied any fevers, chills, typical CP, N/V, abdominal pain, C/D or bleeding. Denied any tobacco or alcohol use. \"    Patient admitted for fluid overload in the setting of missed hemodialysis.  Nephro on board while inpatient, patient received hemodialysis session on 3/5, 3/6, and 3/7.  Patient symptomatically improved.  Nephro okay to discharge patient with close outpatient follow-up and hemodialysis on Monday.    Brief Problem Based Course:     # Volume overload-Resolved  # ESRD on HD MWF  # HFrEF, compensated  - Reported worsening SOB and orthopnea over past 2 days. Oliguric, last HD Monday 3/3, full treatment. Last TTE in 2024 with LVEF of 40-45 with G3DD. Had multiple recent admission for similar issue.   - Clinically hypervolemic. Pro-BNP >70k. CXR with pulmonary congestion.   - ED provider discussed with nephrology, plan for  likely ultrafiltration today and DC tomorrow  - Continue Calcitrol.      # CAD s/p PCI to pLAD in 2024  # Non-MI chronic troponin elevation  - Denied any typical CP.   - Initial Tn elevated, but stable compared to previous levels. ECG without acute ischemic changes.  - Continue ASA, Plavix, Lipitor, Toprol-XL and Imdur.     # Hx of VT in 2024  - Continue Toprol-XL.   - Monitor electrolytes.       # Chronic left foot osteomyelitis  - Following with wound care. Recently evaluated by ID, started on 2-week course of Zyvox, ending 3/10.  - Continue Zyvox.     # Chronic hypoxemic  physician    Please check you weight daily. If your weight increases more than 5 pounds in 1 week, please see your cardiologist. Limit daily salt intake to 2gram per day and consume no more than 4-6 glasses fluids in one day. Take your water pill/ regularly    Please go through Printed reading material and feel free to reach out in case of any queries, concerns or issues per contact provided      Time Spent Discharging patient 35 minutes    Electronically signed by Broderick Schafer MD on 3/8/2025 at 12:06 PM

## 2025-03-11 LAB — GLUCOSE BLD-MCNC: 123 MG/DL (ref 74–99)

## 2025-03-13 ENCOUNTER — HOSPITAL ENCOUNTER (OUTPATIENT)
Dept: WOUND CARE | Age: 50
Discharge: HOME | End: 2025-03-13
Attending: STUDENT IN AN ORGANIZED HEALTH CARE EDUCATION/TRAINING PROGRAM
Payer: MEDICARE

## 2025-03-13 VITALS
SYSTOLIC BLOOD PRESSURE: 149 MMHG | HEART RATE: 76 BPM | TEMPERATURE: 97.6 F | DIASTOLIC BLOOD PRESSURE: 84 MMHG | RESPIRATION RATE: 20 BRPM

## 2025-03-13 DIAGNOSIS — G89.4 CHRONIC PAIN SYNDROME: ICD-10-CM

## 2025-03-13 DIAGNOSIS — E11.621 TYPE 2 DIABETES MELLITUS WITH FOOT ULCER, WITH LONG-TERM CURRENT USE OF INSULIN (HCC): ICD-10-CM

## 2025-03-13 DIAGNOSIS — Z79.4 TYPE 2 DIABETES MELLITUS WITH FOOT ULCER, WITH LONG-TERM CURRENT USE OF INSULIN (HCC): ICD-10-CM

## 2025-03-13 DIAGNOSIS — L97.509 TYPE 2 DIABETES MELLITUS WITH FOOT ULCER, WITH LONG-TERM CURRENT USE OF INSULIN (HCC): ICD-10-CM

## 2025-03-13 DIAGNOSIS — E11.621 DIABETIC ULCER OF RIGHT MIDFOOT ASSOCIATED WITH TYPE 2 DIABETES MELLITUS, WITH FAT LAYER EXPOSED (HCC): ICD-10-CM

## 2025-03-13 DIAGNOSIS — Z89.422: Primary | ICD-10-CM

## 2025-03-13 DIAGNOSIS — L84 PRE-ULCERATIVE CALLUSES: ICD-10-CM

## 2025-03-13 DIAGNOSIS — E87.5 HYPERKALEMIA: ICD-10-CM

## 2025-03-13 DIAGNOSIS — L97.412 DIABETIC ULCER OF RIGHT MIDFOOT ASSOCIATED WITH TYPE 2 DIABETES MELLITUS, WITH FAT LAYER EXPOSED (HCC): ICD-10-CM

## 2025-03-13 DIAGNOSIS — T23.222D PARTIAL THICKNESS BURN OF FINGER OF LEFT HAND, SUBSEQUENT ENCOUNTER: ICD-10-CM

## 2025-03-13 DIAGNOSIS — L98.491 ULCER OF ABDOMEN WALL, LIMITED TO BREAKDOWN OF SKIN (HCC): ICD-10-CM

## 2025-03-13 PROCEDURE — 16020 DRESS/DEBRID P-THICK BURN S: CPT

## 2025-03-13 PROCEDURE — 6370000000 HC RX 637 (ALT 250 FOR IP): Performed by: SURGERY

## 2025-03-13 PROCEDURE — 11042 DBRDMT SUBQ TIS 1ST 20SQCM/<: CPT

## 2025-03-13 PROCEDURE — 2500000003 HC RX 250 WO HCPCS: Performed by: SURGERY

## 2025-03-13 RX ORDER — SILVER SULFADIAZINE 10 MG/G
CREAM TOPICAL ONCE
Status: COMPLETED | OUTPATIENT
Start: 2025-03-13 | End: 2025-03-13

## 2025-03-13 RX ORDER — CLOBETASOL PROPIONATE 0.5 MG/G
OINTMENT TOPICAL ONCE
OUTPATIENT
Start: 2025-03-13 | End: 2025-03-13

## 2025-03-13 RX ORDER — LIDOCAINE 50 MG/G
OINTMENT TOPICAL ONCE
OUTPATIENT
Start: 2025-03-13 | End: 2025-03-13

## 2025-03-13 RX ORDER — LIDOCAINE 50 MG/G
OINTMENT TOPICAL ONCE
Status: COMPLETED | OUTPATIENT
Start: 2025-03-13 | End: 2025-03-13

## 2025-03-13 RX ORDER — OXYCODONE AND ACETAMINOPHEN 7.5; 325 MG/1; MG/1
1 TABLET ORAL EVERY 8 HOURS PRN
Qty: 90 TABLET | Refills: 0 | Status: SHIPPED | OUTPATIENT
Start: 2025-03-13 | End: 2025-04-12

## 2025-03-13 RX ORDER — NEOMYCIN/BACITRACIN/POLYMYXINB 3.5-400-5K
OINTMENT (GRAM) TOPICAL ONCE
OUTPATIENT
Start: 2025-03-13 | End: 2025-03-13

## 2025-03-13 RX ORDER — SILVER SULFADIAZINE 10 MG/G
CREAM TOPICAL ONCE
OUTPATIENT
Start: 2025-03-13 | End: 2025-03-13

## 2025-03-13 RX ORDER — MUPIROCIN 20 MG/G
OINTMENT TOPICAL ONCE
OUTPATIENT
Start: 2025-03-13 | End: 2025-03-13

## 2025-03-13 RX ORDER — SODIUM CHLOR/HYPOCHLOROUS ACID 0.033 %
SOLUTION, IRRIGATION IRRIGATION ONCE
OUTPATIENT
Start: 2025-03-13 | End: 2025-03-13

## 2025-03-13 RX ORDER — LIDOCAINE 40 MG/G
CREAM TOPICAL ONCE
OUTPATIENT
Start: 2025-03-13 | End: 2025-03-13

## 2025-03-13 RX ORDER — GINSENG 100 MG
CAPSULE ORAL ONCE
OUTPATIENT
Start: 2025-03-13 | End: 2025-03-13

## 2025-03-13 RX ORDER — TRIAMCINOLONE ACETONIDE 1 MG/G
OINTMENT TOPICAL ONCE
OUTPATIENT
Start: 2025-03-13 | End: 2025-03-13

## 2025-03-13 RX ORDER — LIDOCAINE HYDROCHLORIDE 20 MG/ML
JELLY TOPICAL ONCE
OUTPATIENT
Start: 2025-03-13 | End: 2025-03-13

## 2025-03-13 RX ORDER — LIDOCAINE HYDROCHLORIDE 40 MG/ML
SOLUTION TOPICAL ONCE
OUTPATIENT
Start: 2025-03-13 | End: 2025-03-13

## 2025-03-13 RX ORDER — BACITRACIN ZINC AND POLYMYXIN B SULFATE 500; 1000 [USP'U]/G; [USP'U]/G
OINTMENT TOPICAL ONCE
OUTPATIENT
Start: 2025-03-13 | End: 2025-03-13

## 2025-03-13 RX ORDER — GENTAMICIN SULFATE 1 MG/G
OINTMENT TOPICAL ONCE
OUTPATIENT
Start: 2025-03-13 | End: 2025-03-13

## 2025-03-13 RX ORDER — GENTAMICIN SULFATE 1 MG/G
OINTMENT TOPICAL ONCE
Status: COMPLETED | OUTPATIENT
Start: 2025-03-13 | End: 2025-03-13

## 2025-03-13 RX ORDER — BETAMETHASONE DIPROPIONATE 0.5 MG/G
CREAM TOPICAL ONCE
OUTPATIENT
Start: 2025-03-13 | End: 2025-03-13

## 2025-03-13 RX ADMIN — GENTAMICIN SULFATE: 1 OINTMENT TOPICAL at 11:36

## 2025-03-13 RX ADMIN — COLLAGENASE SANTYL: 250 OINTMENT TOPICAL at 11:36

## 2025-03-13 RX ADMIN — LIDOCAINE: 50 OINTMENT TOPICAL at 11:35

## 2025-03-13 RX ADMIN — SILVER SULFADIAZINE: 10 CREAM TOPICAL at 11:37

## 2025-03-13 ASSESSMENT — PAIN DESCRIPTION - DESCRIPTORS: DESCRIPTORS: SHARP

## 2025-03-13 ASSESSMENT — PAIN DESCRIPTION - LOCATION: LOCATION: FOOT

## 2025-03-13 ASSESSMENT — PAIN DESCRIPTION - ORIENTATION: ORIENTATION: RIGHT

## 2025-03-13 ASSESSMENT — PAIN SCALES - GENERAL
PAINLEVEL_OUTOF10: 6
PAINLEVEL_OUTOF10: 0

## 2025-03-13 NOTE — PROGRESS NOTES
Nonselective enzymatic debridement performed with Santyl per physician order to wound(s) of the abdomen  Patient tolerated the procedure well.

## 2025-03-13 NOTE — PROGRESS NOTES
Multilayer Compression Wrap   (Not Unna) Below the Knee    NAME:  Yovanny Levine  YOB: 1975  MEDICAL RECORD NUMBER:  [id number]  DATE:  3/13/2025    Multilayer compression wrap: Removed old Multilayer wrap if indicated and wash leg with mild soap/water.  Applied moisturizing agent to dry skin as needed.   Applied primary and secondary dressing as ordered.  Applied multilayered dressing below the knee to right lower leg.  Instructed patient/caregiver not to remove dressing and to keep it clean and dry.   Instructed patient/caregiver on complications to report to provider, such as pain, numbness in toes, heavy drainage, and slippage of dressing.  Instructed patient on purpose of compression dressing and on activity and exercise recommendations.      Electronically signed by Kat Dailey LPN on 3/13/2025 at 11:32 AM

## 2025-03-13 NOTE — PATIENT INSTRUCTIONS
PHYSICIAN ORDERS AND DISCHARGE INSTRUCTIONS     Wound Care Notes:  Sees Dr Kumar.                Applied for Kerecis grafts on 24  Donated Kerecis applied to left lateral foot 24                             Orders for this week: 3/13/2025    Right Plantar: Wash with soap and water. Pat dry  Give BLE new surgical shoes with pegassist to offload right plantar wound 3/13/25  Apply stimulen powder to wound bed   Pack with betadine gauze  Offload with qwick to periwound  Cover with zetuvit   Wrap w/ coban 2 lites  Leave in place for 1 week    Left hand 3rd finger: Wash with soap and water, pat dry  Apply silvadene and stimulen to wound bed   Cover with conform and coban to finger   Change daily      Abdomen: Wash with soap and water, pat dry.  Shave around area   Santyl, Gentamicin, lidocaine, and stimulen powder  Cover with ca alginate and gentac w/ tegaderm  Change Daily       Leave in place til next visit to Wound Care Center    Plan: HBO workup started 9/10/24.   [facility] discharge due to not home bound 24.   Toenails 2025. Called senait about abx. No answer 10/28/24        Follow Up Instructions: 1 weeks  Primary Wound Care Provider: Shelly Rubalcava CNP  Call  for any questions or concerns.  Central [phone] for imaging lab work

## 2025-03-14 NOTE — PROGRESS NOTES
Wound Care Center Progress Visit      Yovanny Levine  AGE: 49 y.o.   GENDER: male  : 1975  EPISODE DATE:  3/13/2025   Referred by: José Luis Izquierdo MD     Subjective:     CHIEF COMPLAINT  WOUND   Problem List Items Addressed This Visit          Endocrine    Type 2 diabetes mellitus with skin complication, with long-term current use of insulin (HCC)    Diabetic ulcer of right midfoot associated with type 2 diabetes mellitus, with fat layer exposed (HCC)       Musculoskeletal and Integument    Ulcer of abdomen wall, limited to breakdown of skin (HCC)    Pre-ulcerative calluses    Partial thickness burn of finger of left hand       Other    Absence of toe of left foot - Primary    Hyperkalemia     Other Visit Diagnoses         Chronic pain syndrome        Relevant Medications    oxyCODONE-acetaminophen (PERCOCET) 7.5-325 MG per tablet          Chief Complaint   Patient presents with    Wound Check        HISTORY of PRESENT ILLNESS      Yovanny Levine is a 49 y.o. male who presents to the Wound Clinic for evaluation and treatment of Chronic diabetic  and  surgical ulcer(s) of the left foot. The condition is of marked severity. The patient has been seeing Dr. Webber at the wound clinic since 24. He was hospitalized -24  Sepsis secondary to left foot cellulitis and chronic left diabetic foot ulcer. General surgery consulted, patient underwent incision and drainage of left foot  and again 2/10/24, IV antibiotics given, home on Augmentin through 3/20/24.  Hospitalized 3/12-3/20/24 with diabetic foot infection, IV antibiotics, switched to oral Cipro. Hospitalized -24 with I&D and foot debridement per Dr. Forrest 24 with wound vac placement. Osteomyelitis, infectious disease consult: plan for vancomycin with dialysis for 6-week cumulative course with end date of 24. The patient has significant underlying medical conditions as below. José Luis Izquierdo MD is PCP.    Wound Pain

## 2025-03-17 ENCOUNTER — APPOINTMENT (OUTPATIENT)
Dept: GENERAL RADIOLOGY | Age: 50
DRG: 640 | End: 2025-03-17
Payer: MEDICARE

## 2025-03-17 ENCOUNTER — HOSPITAL ENCOUNTER (INPATIENT)
Age: 50
LOS: 2 days | Discharge: HOME | DRG: 640 | End: 2025-03-19
Attending: STUDENT IN AN ORGANIZED HEALTH CARE EDUCATION/TRAINING PROGRAM | Admitting: STUDENT IN AN ORGANIZED HEALTH CARE EDUCATION/TRAINING PROGRAM
Payer: MEDICARE

## 2025-03-17 DIAGNOSIS — N18.6 END STAGE RENAL DISEASE (HCC): Primary | ICD-10-CM

## 2025-03-17 DIAGNOSIS — J10.1 INFLUENZA A: ICD-10-CM

## 2025-03-17 DIAGNOSIS — J90 PLEURAL EFFUSION: ICD-10-CM

## 2025-03-17 LAB
ALBUMIN SERPL-MCNC: 3.8 G/DL (ref 3.4–5)
ALBUMIN/GLOB SERPL: 1.4 {RATIO} (ref 1.1–2.2)
ALP SERPL-CCNC: 75 U/L (ref 40–129)
ALT SERPL-CCNC: 29 U/L (ref 10–40)
ANION GAP SERPL CALCULATED.3IONS-SCNC: 24 MMOL/L (ref 9–17)
AST SERPL-CCNC: 26 U/L (ref 15–37)
B PARAP IS1001 DNA NPH QL NAA+NON-PROBE: NOT DETECTED
B PERT DNA SPEC QL NAA+PROBE: NOT DETECTED
BASOPHILS # BLD: 0.01 K/UL
BASOPHILS NFR BLD: 0 % (ref 0–1)
BILIRUB SERPL-MCNC: 0.4 MG/DL (ref 0–1)
BNP SERPL-MCNC: ABNORMAL PG/ML (ref 0–125)
BUN SERPL-MCNC: 103 MG/DL (ref 7–20)
C PNEUM DNA NPH QL NAA+NON-PROBE: NOT DETECTED
CALCIUM SERPL-MCNC: 7.3 MG/DL (ref 8.3–10.6)
CHLORIDE SERPL-SCNC: 94 MMOL/L (ref 99–110)
CO2 SERPL-SCNC: 18 MMOL/L (ref 21–32)
CREAT SERPL-MCNC: 10.2 MG/DL (ref 0.9–1.3)
EOSINOPHIL # BLD: 0.06 K/UL
EOSINOPHILS RELATIVE PERCENT: 2 % (ref 0–3)
ERYTHROCYTE [DISTWIDTH] IN BLOOD BY AUTOMATED COUNT: 15.6 % (ref 11.7–14.9)
FLUAV H1 2009 PAN RNA NPH NAA+NON-PROBE: DETECTED
FLUAV RNA NPH QL NAA+NON-PROBE: DETECTED
FLUBV RNA NPH QL NAA+NON-PROBE: NOT DETECTED
GFR, ESTIMATED: 5 ML/MIN/1.73M2
GLUCOSE BLD-MCNC: 118 MG/DL (ref 74–99)
GLUCOSE SERPL-MCNC: 187 MG/DL (ref 74–99)
HADV DNA NPH QL NAA+NON-PROBE: NOT DETECTED
HCOV 229E RNA NPH QL NAA+NON-PROBE: NOT DETECTED
HCOV HKU1 RNA NPH QL NAA+NON-PROBE: NOT DETECTED
HCOV NL63 RNA NPH QL NAA+NON-PROBE: NOT DETECTED
HCOV OC43 RNA NPH QL NAA+NON-PROBE: NOT DETECTED
HCT VFR BLD AUTO: 24.6 % (ref 42–52)
HGB BLD-MCNC: 7.9 G/DL (ref 13.5–18)
HMPV RNA NPH QL NAA+NON-PROBE: NOT DETECTED
HPIV1 RNA NPH QL NAA+NON-PROBE: NOT DETECTED
HPIV2 RNA NPH QL NAA+NON-PROBE: NOT DETECTED
HPIV3 RNA NPH QL NAA+NON-PROBE: NOT DETECTED
HPIV4 RNA NPH QL NAA+NON-PROBE: NOT DETECTED
IMM GRANULOCYTES # BLD AUTO: 0.01 K/UL
IMM GRANULOCYTES NFR BLD: 0 %
INR PPP: 1
LYMPHOCYTES NFR BLD: 0.3 K/UL
LYMPHOCYTES RELATIVE PERCENT: 10 % (ref 24–44)
M PNEUMO DNA NPH QL NAA+NON-PROBE: NOT DETECTED
MAGNESIUM SERPL-MCNC: 2.2 MG/DL (ref 1.8–2.4)
MCH RBC QN AUTO: 31.9 PG (ref 27–31)
MCHC RBC AUTO-ENTMCNC: 32.1 G/DL (ref 32–36)
MCV RBC AUTO: 99.2 FL (ref 78–100)
MONOCYTES NFR BLD: 0.32 K/UL
MONOCYTES NFR BLD: 10 % (ref 0–4)
NEUTROPHILS NFR BLD: 78 % (ref 36–66)
NEUTS SEG NFR BLD: 2.46 K/UL
PLATELET, FLUORESCENCE: 73 K/UL (ref 140–440)
PMV BLD AUTO: 11 FL (ref 7.5–11.1)
POTASSIUM SERPL-SCNC: 4.7 MMOL/L (ref 3.5–5.1)
PROT SERPL-MCNC: 6.6 G/DL (ref 6.4–8.2)
PROTHROMBIN TIME: 14 SEC (ref 11.7–14.5)
RBC # BLD AUTO: 2.48 M/UL (ref 4.6–6.2)
RSV RNA NPH QL NAA+NON-PROBE: NOT DETECTED
RV+EV RNA NPH QL NAA+NON-PROBE: NOT DETECTED
SARS-COV-2 RNA NPH QL NAA+NON-PROBE: NOT DETECTED
SODIUM SERPL-SCNC: 136 MMOL/L (ref 136–145)
SPECIMEN DESCRIPTION: ABNORMAL
TROPONIN I SERPL HS-MCNC: 111 NG/L (ref 0–22)
TROPONIN I SERPL HS-MCNC: 114 NG/L (ref 0–22)
WBC OTHER # BLD: 3.2 K/UL (ref 4–10.5)

## 2025-03-17 PROCEDURE — 85025 COMPLETE CBC W/AUTO DIFF WBC: CPT

## 2025-03-17 PROCEDURE — 83880 ASSAY OF NATRIURETIC PEPTIDE: CPT

## 2025-03-17 PROCEDURE — 6360000002 HC RX W HCPCS: Performed by: INTERNAL MEDICINE

## 2025-03-17 PROCEDURE — 83735 ASSAY OF MAGNESIUM: CPT

## 2025-03-17 PROCEDURE — 6360000002 HC RX W HCPCS: Performed by: STUDENT IN AN ORGANIZED HEALTH CARE EDUCATION/TRAINING PROGRAM

## 2025-03-17 PROCEDURE — 1200000000 HC SEMI PRIVATE

## 2025-03-17 PROCEDURE — 5A1D70Z PERFORMANCE OF URINARY FILTRATION, INTERMITTENT, LESS THAN 6 HOURS PER DAY: ICD-10-PCS | Performed by: STUDENT IN AN ORGANIZED HEALTH CARE EDUCATION/TRAINING PROGRAM

## 2025-03-17 PROCEDURE — 6370000000 HC RX 637 (ALT 250 FOR IP): Performed by: PHYSICIAN ASSISTANT

## 2025-03-17 PROCEDURE — 99285 EMERGENCY DEPT VISIT HI MDM: CPT

## 2025-03-17 PROCEDURE — 6370000000 HC RX 637 (ALT 250 FOR IP): Performed by: STUDENT IN AN ORGANIZED HEALTH CARE EDUCATION/TRAINING PROGRAM

## 2025-03-17 PROCEDURE — 71045 X-RAY EXAM CHEST 1 VIEW: CPT

## 2025-03-17 PROCEDURE — 85610 PROTHROMBIN TIME: CPT

## 2025-03-17 PROCEDURE — 93005 ELECTROCARDIOGRAM TRACING: CPT | Performed by: PHYSICIAN ASSISTANT

## 2025-03-17 PROCEDURE — 80053 COMPREHEN METABOLIC PANEL: CPT

## 2025-03-17 PROCEDURE — 82962 GLUCOSE BLOOD TEST: CPT

## 2025-03-17 PROCEDURE — 0202U NFCT DS 22 TRGT SARS-COV-2: CPT

## 2025-03-17 PROCEDURE — 84484 ASSAY OF TROPONIN QUANT: CPT

## 2025-03-17 RX ORDER — CALCIUM CARBONATE 500 MG/1
2 TABLET, CHEWABLE ORAL 2 TIMES DAILY
Status: DISCONTINUED | OUTPATIENT
Start: 2025-03-17 | End: 2025-03-19 | Stop reason: HOSPADM

## 2025-03-17 RX ORDER — CALCITRIOL 0.25 UG/1
0.5 CAPSULE, LIQUID FILLED ORAL 2 TIMES DAILY
Status: DISCONTINUED | OUTPATIENT
Start: 2025-03-17 | End: 2025-03-19 | Stop reason: HOSPADM

## 2025-03-17 RX ORDER — HYDRALAZINE HYDROCHLORIDE 25 MG/1
25 TABLET, FILM COATED ORAL 2 TIMES DAILY
Status: DISCONTINUED | OUTPATIENT
Start: 2025-03-17 | End: 2025-03-19 | Stop reason: HOSPADM

## 2025-03-17 RX ORDER — OSELTAMIVIR PHOSPHATE 30 MG/1
30 CAPSULE ORAL ONCE
Status: DISCONTINUED | OUTPATIENT
Start: 2025-03-17 | End: 2025-03-19 | Stop reason: HOSPADM

## 2025-03-17 RX ORDER — MAGNESIUM SULFATE IN WATER 40 MG/ML
2000 INJECTION, SOLUTION INTRAVENOUS PRN
Status: DISCONTINUED | OUTPATIENT
Start: 2025-03-17 | End: 2025-03-19 | Stop reason: HOSPADM

## 2025-03-17 RX ORDER — ASPIRIN 81 MG/1
81 TABLET, CHEWABLE ORAL DAILY
Status: DISCONTINUED | OUTPATIENT
Start: 2025-03-18 | End: 2025-03-19 | Stop reason: HOSPADM

## 2025-03-17 RX ORDER — SODIUM CHLORIDE 0.9 % (FLUSH) 0.9 %
5-40 SYRINGE (ML) INJECTION EVERY 12 HOURS SCHEDULED
Status: DISCONTINUED | OUTPATIENT
Start: 2025-03-17 | End: 2025-03-19 | Stop reason: HOSPADM

## 2025-03-17 RX ORDER — ONDANSETRON 4 MG/1
4 TABLET, ORALLY DISINTEGRATING ORAL EVERY 8 HOURS PRN
Status: DISCONTINUED | OUTPATIENT
Start: 2025-03-17 | End: 2025-03-19 | Stop reason: HOSPADM

## 2025-03-17 RX ORDER — CLOPIDOGREL BISULFATE 75 MG/1
75 TABLET ORAL DAILY
Status: DISCONTINUED | OUTPATIENT
Start: 2025-03-18 | End: 2025-03-19 | Stop reason: HOSPADM

## 2025-03-17 RX ORDER — ROPINIROLE 0.25 MG/1
0.25 TABLET, FILM COATED ORAL NIGHTLY
Status: DISCONTINUED | OUTPATIENT
Start: 2025-03-17 | End: 2025-03-19 | Stop reason: HOSPADM

## 2025-03-17 RX ORDER — HEPARIN SODIUM 1000 [USP'U]/ML
1000 INJECTION, SOLUTION INTRAVENOUS; SUBCUTANEOUS ONCE
Status: COMPLETED | OUTPATIENT
Start: 2025-03-17 | End: 2025-03-17

## 2025-03-17 RX ORDER — GLUCAGON 1 MG/ML
1 KIT INJECTION PRN
Status: DISCONTINUED | OUTPATIENT
Start: 2025-03-17 | End: 2025-03-19 | Stop reason: HOSPADM

## 2025-03-17 RX ORDER — DEXTROSE MONOHYDRATE 100 MG/ML
INJECTION, SOLUTION INTRAVENOUS CONTINUOUS PRN
Status: DISCONTINUED | OUTPATIENT
Start: 2025-03-17 | End: 2025-03-19 | Stop reason: HOSPADM

## 2025-03-17 RX ORDER — ISOSORBIDE MONONITRATE 30 MG/1
30 TABLET, EXTENDED RELEASE ORAL DAILY
Status: DISCONTINUED | OUTPATIENT
Start: 2025-03-18 | End: 2025-03-19 | Stop reason: HOSPADM

## 2025-03-17 RX ORDER — PANTOPRAZOLE SODIUM 40 MG/1
40 TABLET, DELAYED RELEASE ORAL DAILY
Status: DISCONTINUED | OUTPATIENT
Start: 2025-03-18 | End: 2025-03-19 | Stop reason: HOSPADM

## 2025-03-17 RX ORDER — ATORVASTATIN CALCIUM 40 MG/1
40 TABLET, FILM COATED ORAL NIGHTLY
Status: DISCONTINUED | OUTPATIENT
Start: 2025-03-17 | End: 2025-03-19 | Stop reason: HOSPADM

## 2025-03-17 RX ORDER — SODIUM CHLORIDE 0.9 % (FLUSH) 0.9 %
5-40 SYRINGE (ML) INJECTION PRN
Status: DISCONTINUED | OUTPATIENT
Start: 2025-03-17 | End: 2025-03-19 | Stop reason: HOSPADM

## 2025-03-17 RX ORDER — OSELTAMIVIR PHOSPHATE 30 MG/1
30 CAPSULE ORAL
Status: DISCONTINUED | OUTPATIENT
Start: 2025-03-18 | End: 2025-03-19 | Stop reason: HOSPADM

## 2025-03-17 RX ORDER — ACETAMINOPHEN 325 MG/1
650 TABLET ORAL ONCE
Status: COMPLETED | OUTPATIENT
Start: 2025-03-17 | End: 2025-03-17

## 2025-03-17 RX ORDER — ONDANSETRON 2 MG/ML
4 INJECTION INTRAMUSCULAR; INTRAVENOUS EVERY 6 HOURS PRN
Status: DISCONTINUED | OUTPATIENT
Start: 2025-03-17 | End: 2025-03-19 | Stop reason: HOSPADM

## 2025-03-17 RX ORDER — HEPARIN SODIUM 5000 [USP'U]/ML
5000 INJECTION, SOLUTION INTRAVENOUS; SUBCUTANEOUS EVERY 8 HOURS SCHEDULED
Status: DISCONTINUED | OUTPATIENT
Start: 2025-03-17 | End: 2025-03-19 | Stop reason: HOSPADM

## 2025-03-17 RX ORDER — INSULIN LISPRO 100 [IU]/ML
0-8 INJECTION, SOLUTION INTRAVENOUS; SUBCUTANEOUS
Status: DISCONTINUED | OUTPATIENT
Start: 2025-03-17 | End: 2025-03-19 | Stop reason: HOSPADM

## 2025-03-17 RX ORDER — HEPARIN SODIUM 1000 [USP'U]/ML
1000 INJECTION, SOLUTION INTRAVENOUS; SUBCUTANEOUS
Status: DISCONTINUED | OUTPATIENT
Start: 2025-03-17 | End: 2025-03-19

## 2025-03-17 RX ORDER — SODIUM CHLORIDE 9 MG/ML
INJECTION, SOLUTION INTRAVENOUS PRN
Status: DISCONTINUED | OUTPATIENT
Start: 2025-03-17 | End: 2025-03-19 | Stop reason: HOSPADM

## 2025-03-17 RX ORDER — ALBUTEROL SULFATE 90 UG/1
2 INHALANT RESPIRATORY (INHALATION) 4 TIMES DAILY PRN
Status: DISCONTINUED | OUTPATIENT
Start: 2025-03-17 | End: 2025-03-19 | Stop reason: HOSPADM

## 2025-03-17 RX ORDER — OXYCODONE AND ACETAMINOPHEN 7.5; 325 MG/1; MG/1
1 TABLET ORAL EVERY 8 HOURS PRN
Status: DISCONTINUED | OUTPATIENT
Start: 2025-03-17 | End: 2025-03-19 | Stop reason: HOSPADM

## 2025-03-17 RX ORDER — POLYETHYLENE GLYCOL 3350 17 G/17G
17 POWDER, FOR SOLUTION ORAL DAILY PRN
Status: DISCONTINUED | OUTPATIENT
Start: 2025-03-17 | End: 2025-03-19 | Stop reason: HOSPADM

## 2025-03-17 RX ORDER — INSULIN GLARGINE 100 [IU]/ML
10 INJECTION, SOLUTION SUBCUTANEOUS NIGHTLY
Status: DISCONTINUED | OUTPATIENT
Start: 2025-03-17 | End: 2025-03-19 | Stop reason: HOSPADM

## 2025-03-17 RX ORDER — ACETAMINOPHEN 650 MG/1
650 SUPPOSITORY RECTAL EVERY 6 HOURS PRN
Status: DISCONTINUED | OUTPATIENT
Start: 2025-03-17 | End: 2025-03-19 | Stop reason: HOSPADM

## 2025-03-17 RX ORDER — METOPROLOL SUCCINATE 25 MG/1
25 TABLET, EXTENDED RELEASE ORAL DAILY
Status: DISCONTINUED | OUTPATIENT
Start: 2025-03-18 | End: 2025-03-19 | Stop reason: HOSPADM

## 2025-03-17 RX ORDER — ONDANSETRON 2 MG/ML
4 INJECTION INTRAMUSCULAR; INTRAVENOUS EVERY 6 HOURS PRN
Status: DISCONTINUED | OUTPATIENT
Start: 2025-03-17 | End: 2025-03-17 | Stop reason: SDUPTHER

## 2025-03-17 RX ORDER — ACETAMINOPHEN 325 MG/1
650 TABLET ORAL EVERY 6 HOURS PRN
Status: DISCONTINUED | OUTPATIENT
Start: 2025-03-17 | End: 2025-03-19 | Stop reason: HOSPADM

## 2025-03-17 RX ORDER — SODIUM CHLOR/HYPOCHLOROUS ACID 0.033 %
SOLUTION, IRRIGATION IRRIGATION PRN
Status: DISCONTINUED | OUTPATIENT
Start: 2025-03-17 | End: 2025-03-19 | Stop reason: HOSPADM

## 2025-03-17 RX ORDER — FUROSEMIDE 40 MG/1
40 TABLET ORAL DAILY
Status: DISCONTINUED | OUTPATIENT
Start: 2025-03-18 | End: 2025-03-19 | Stop reason: HOSPADM

## 2025-03-17 RX ADMIN — CALCITRIOL CAPSULES 0.25 MCG 0.5 MCG: 0.25 CAPSULE ORAL at 22:35

## 2025-03-17 RX ADMIN — HYDRALAZINE HYDROCHLORIDE 25 MG: 25 TABLET ORAL at 22:35

## 2025-03-17 RX ADMIN — CALCIUM CARBONATE 1000 MG: 500 TABLET, CHEWABLE ORAL at 22:35

## 2025-03-17 RX ADMIN — OXYCODONE HYDROCHLORIDE AND ACETAMINOPHEN 1 TABLET: 7.5; 325 TABLET ORAL at 22:35

## 2025-03-17 RX ADMIN — ACETAMINOPHEN 650 MG: 325 TABLET ORAL at 12:34

## 2025-03-17 RX ADMIN — HEPARIN SODIUM 5000 UNITS: 5000 INJECTION INTRAVENOUS; SUBCUTANEOUS at 22:35

## 2025-03-17 RX ADMIN — HEPARIN SODIUM 1000 UNITS: 1000 INJECTION INTRAVENOUS; SUBCUTANEOUS at 17:25

## 2025-03-17 RX ADMIN — ATORVASTATIN CALCIUM 40 MG: 40 TABLET, FILM COATED ORAL at 22:35

## 2025-03-17 RX ADMIN — INSULIN GLARGINE 10 UNITS: 100 INJECTION, SOLUTION SUBCUTANEOUS at 22:49

## 2025-03-17 RX ADMIN — ROPINIROLE HYDROCHLORIDE 0.25 MG: 0.25 TABLET, FILM COATED ORAL at 22:35

## 2025-03-17 ASSESSMENT — PAIN SCALES - WONG BAKER: WONGBAKER_NUMERICALRESPONSE: HURTS WHOLE LOT

## 2025-03-17 ASSESSMENT — PAIN DESCRIPTION - LOCATION: LOCATION: BACK;FOOT;LEG

## 2025-03-17 ASSESSMENT — PAIN SCALES - GENERAL: PAINLEVEL_OUTOF10: 8

## 2025-03-17 NOTE — ED NOTES
[id number] perfect serve message sent to Dr James on in pt consult from hospitalist     2010 Dr James acknowledged perfect serve message. Added to treatment team.  
Date    Abscess 2010    scrotal    Acute renal failure (ARF) 08/04/2019    Anxiety associated with depression     Back pain 07/02/2012    CAD (coronary artery disease) 02/10/2023    Chest pain 05/01/2013    Diabetic neuropathy (HCC) 12/22/2011    Diabetic ulcer of left midfoot associated with type 2 diabetes mellitus, with fat layer exposed ([facility]) 07/18/2017    Diverticulosis     C scope + Dr. Medina    DM (diabetes mellitus), type 2 ([facility]) 2002    DR. Turner podiatry    ESRD (end stage renal disease) on dialysis ([facility])     every MWF    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 ([facility])     H/O percutaneous left heart catheterization 09/30/2021    PCI procedure:DE Stent, LAD: DE Stent Plcmt Initl Vsl    Heart failure ([facility]) 02/17/2025    Hyperlipidemia LDL goal < 100 07/15/2013    Hypertension     Internal hemorrhoid     C scope + Dr. Medina    Nausea and vomiting 01/11/2019    Necrotizing fasciitis ([facility])     Nose fracture 1988    Panic attacks     Pericarditis 2003    Hospitalized with s/p heart cath normal    Peripheral autonomic neuropathy due to diabetes mellitus ([facility])     Axonal EMG- NCS, March 2011    Sebaceous cyst 09/01/2011    URI (upper respiratory infection) 02/27/2012    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 type 2 diabetes mellitus, with muscle involvement without evidence of necrosis ([facility]) 09/21/2021    WD-Wound, surgical, infected, initial encounter 12/08/2017    Wrist fracture 1986       Assessment    Vitals: MEWS Score: 0  Level of Consciousness: Alert (0)   Vitals:    03/17/25 1102 03/17/25 1104 03/17/25 1314 03/17/25 1514   BP: 130/70      Pulse: 71  70 68   Resp: 14 17 21   Temp: 97.9 °F (36.6 °C)      TempSrc: Oral      SpO2: 91% 94% 97% 100%       PO Status: Nothing by Mouth    O2 Flow Rate: O2 Device: None (Room air)      Cardiac Rhythm: nsr    Last documented pain medication administered:     NIH Score: NIH       Active LDA's:      Pertinent or High Risk Medications/Drips: no   If Yes, please

## 2025-03-17 NOTE — ED PROVIDER NOTES
ED attending EKG interpretation (I otherwise did not participate in the care of this patient)    EKG Interpretation  Interpreted by me  Compared to 3/4/2025  Rhythm: normal sinus   Rate: normal 70  Axis: normal  Ectopy: none  Conduction: Prolonged QTc (486 ms)  ST Segments: no acute change  T Waves: no acute change  Clinical Impression: normal sinus rhythm, prolonged QTc     Georgie Delgado MD  03/17/25 0077    
the hospital for observations for influenza as well as getting his dialysis performed.  Patient is encephalopathic is not hyperkalemic, is not complaining of significant chest pain no EKG findings consistent with myocardial infarction at this time.    History from : Patient    Limitations to history : None    Patient was given the following medications:  Medications   ondansetron (ZOFRAN) injection 4 mg (has no administration in time range)   acetaminophen (TYLENOL) tablet 650 mg (650 mg Oral Given 3/17/25 1234)       Independent Imaging Interpretation by me: Mild blunting of the left costophrenic angle otherwise unremarkable    EKG (if obtained): (All EKG's are interpreted by myself in the absence of a cardiologist) EKG at 70 bpm sinus rhythm, has a prolonged QTc mildly at 486 but does not have an axis deviation unremarkable ST segments and T waves.    Chronic conditions affecting care: End-stage renal disease    Social Determinants : None    Records Reviewed : None      Disposition Considerations (tests considered but not done, Shared Decision Making, Pt Expectation of Test or Tx.):   Admitted for observations                      Discussion with Other Profesionals : None    Discharge condition: stable    I am the Primary Clinician of Record.    Is this patient to be included in the SEP-1 Core Measure due to severe sepsis or septic shock?   No    Total critical care time today provided was 0 minutes. This excludes seperately billable procedure. Critical care time provided for 0 that required close evaluation and/or intervention with concern for patient decompensation.    Clinical Impression:  1. End stage renal disease (HCC)    2. Influenza A    3. Pleural effusion      Disposition referral (if applicable):  No follow-up provider specified.  Disposition medications (if applicable):  New Prescriptions    No medications on file     ED Provider Disposition Time  DISPOSITION Admitted 03/17/2025 04:02:42 PM

## 2025-03-17 NOTE — H&P
Code Status Prior   Surrogate Decision Maker/ POA      Personally reviewed Lab Studies and Imaging     Discussed management of the case with ER provider who recommended admission    EKG interpreted personally and results NSR    Imaging that was interpreted personally includes Chest xray and results effusion    Drugs that require monitoring for toxicity include Insulin and the method of monitoring was POCT        History from:     patient    History of Present Illness:     Chief Complaint: weakness and SOB  Yovanny Levine is a 49 y.o. male who presents with generalized weakness x 3 days. He also has cough that is nonproductive as well as diarrhea . Denies hematochezia or vomiting. Also reports feeling shortness of breath. Has history of congestive heart failure as well as heart catheterization he also has chest tightness without radiation. Denies any abdominal pain. Has been keeping up with eating and drinking as best he can also has a history of end-stage renal disease on dialysis and is oliguric. Found to have influenza A. Also missed his HD     Review of Systems:        Pertinent positives and negatives discussed in HPI     Objective:   No intake or output data in the 24 hours ending 03/17/25 1607   Vitals:   Vitals:    03/17/25 1102 03/17/25 1104 03/17/25 1314 03/17/25 1514   BP: 130/70      Pulse: 71  70 68   Resp: 14  17 21   Temp: 97.9 °F (36.6 °C)      TempSrc: Oral      SpO2: 91% 94% 97% 100%       Personally Reviewed Medications Prior to Admission     Prior to Admission medications    Medication Sig Start Date End Date Taking? Authorizing Provider   oxyCODONE-acetaminophen (PERCOCET) 7.5-325 MG per tablet Take 1 tablet by mouth every 8 hours as needed for Pain for up to 30 days. Intended supply: 30 days Max Daily Amount: 3 tablets 3/13/25 4/12/25  Shelly Rubalcava, APRN - CNP   metoprolol succinate (TOPROL XL) 25 MG extended release tablet Take 1 tablet by mouth daily    Provider, MD Deejay

## 2025-03-17 NOTE — ED TRIAGE NOTES
Pt has multiple complains. Cough and diarrhea x 3 days. Maybe passed out onto his bed last night. Rt leg pain from puncture in foot. Ear ringing and SOB.

## 2025-03-18 LAB
ALBUMIN SERPL-MCNC: 3.6 G/DL (ref 3.4–5)
ALBUMIN/GLOB SERPL: 1.2 {RATIO} (ref 1.1–2.2)
ALP SERPL-CCNC: 72 U/L (ref 40–129)
ALT SERPL-CCNC: 23 U/L (ref 10–40)
ANION GAP SERPL CALCULATED.3IONS-SCNC: 17 MMOL/L (ref 9–17)
AST SERPL-CCNC: 26 U/L (ref 15–37)
BASOPHILS # BLD: 0.01 K/UL
BASOPHILS NFR BLD: 0 % (ref 0–1)
BILIRUB SERPL-MCNC: 0.4 MG/DL (ref 0–1)
BUN SERPL-MCNC: 69 MG/DL (ref 7–20)
CALCIUM SERPL-MCNC: 7.7 MG/DL (ref 8.3–10.6)
CHLORIDE SERPL-SCNC: 96 MMOL/L (ref 99–110)
CO2 SERPL-SCNC: 24 MMOL/L (ref 21–32)
CREAT SERPL-MCNC: 7.6 MG/DL (ref 0.9–1.3)
EOSINOPHIL # BLD: 0.05 K/UL
EOSINOPHILS RELATIVE PERCENT: 2 % (ref 0–3)
ERYTHROCYTE [DISTWIDTH] IN BLOOD BY AUTOMATED COUNT: 15.5 % (ref 11.7–14.9)
GFR, ESTIMATED: 8 ML/MIN/1.73M2
GLUCOSE BLD-MCNC: 108 MG/DL (ref 74–99)
GLUCOSE BLD-MCNC: 145 MG/DL (ref 74–99)
GLUCOSE BLD-MCNC: 158 MG/DL (ref 74–99)
GLUCOSE BLD-MCNC: 181 MG/DL (ref 74–99)
GLUCOSE SERPL-MCNC: 128 MG/DL (ref 74–99)
HCT VFR BLD AUTO: 23.1 % (ref 42–52)
HGB BLD-MCNC: 7.5 G/DL (ref 13.5–18)
IMM GRANULOCYTES # BLD AUTO: 0.01 K/UL
IMM GRANULOCYTES NFR BLD: 0 %
LYMPHOCYTES NFR BLD: 0.33 K/UL
LYMPHOCYTES RELATIVE PERCENT: 10 % (ref 24–44)
MCH RBC QN AUTO: 31 PG (ref 27–31)
MCHC RBC AUTO-ENTMCNC: 32.5 G/DL (ref 32–36)
MCV RBC AUTO: 95.5 FL (ref 78–100)
MONOCYTES NFR BLD: 0.52 K/UL
MONOCYTES NFR BLD: 15 % (ref 0–4)
NEUTROPHILS NFR BLD: 73 % (ref 36–66)
NEUTS SEG NFR BLD: 2.48 K/UL
PLATELET CONFIRMATION: NORMAL
PLATELET, FLUORESCENCE: 74 K/UL (ref 140–440)
PMV BLD AUTO: 10.4 FL (ref 7.5–11.1)
POTASSIUM SERPL-SCNC: 4.1 MMOL/L (ref 3.5–5.1)
PROT SERPL-MCNC: 6.7 G/DL (ref 6.4–8.2)
RBC # BLD AUTO: 2.42 M/UL (ref 4.6–6.2)
SODIUM SERPL-SCNC: 137 MMOL/L (ref 136–145)
WBC OTHER # BLD: 3.4 K/UL (ref 4–10.5)

## 2025-03-18 PROCEDURE — 6370000000 HC RX 637 (ALT 250 FOR IP): Performed by: NURSE PRACTITIONER

## 2025-03-18 PROCEDURE — 94640 AIRWAY INHALATION TREATMENT: CPT

## 2025-03-18 PROCEDURE — 82962 GLUCOSE BLOOD TEST: CPT

## 2025-03-18 PROCEDURE — 6370000000 HC RX 637 (ALT 250 FOR IP): Performed by: INTERNAL MEDICINE

## 2025-03-18 PROCEDURE — 6360000002 HC RX W HCPCS: Performed by: INTERNAL MEDICINE

## 2025-03-18 PROCEDURE — 6370000000 HC RX 637 (ALT 250 FOR IP): Performed by: STUDENT IN AN ORGANIZED HEALTH CARE EDUCATION/TRAINING PROGRAM

## 2025-03-18 PROCEDURE — 36415 COLL VENOUS BLD VENIPUNCTURE: CPT

## 2025-03-18 PROCEDURE — 2500000003 HC RX 250 WO HCPCS: Performed by: INTERNAL MEDICINE

## 2025-03-18 PROCEDURE — 1200000000 HC SEMI PRIVATE

## 2025-03-18 PROCEDURE — 85025 COMPLETE CBC W/AUTO DIFF WBC: CPT

## 2025-03-18 PROCEDURE — 6360000002 HC RX W HCPCS: Performed by: STUDENT IN AN ORGANIZED HEALTH CARE EDUCATION/TRAINING PROGRAM

## 2025-03-18 PROCEDURE — 94761 N-INVAS EAR/PLS OXIMETRY MLT: CPT

## 2025-03-18 PROCEDURE — 90935 HEMODIALYSIS ONE EVALUATION: CPT

## 2025-03-18 PROCEDURE — 80053 COMPREHEN METABOLIC PANEL: CPT

## 2025-03-18 PROCEDURE — 2700000000 HC OXYGEN THERAPY PER DAY

## 2025-03-18 PROCEDURE — 2500000003 HC RX 250 WO HCPCS: Performed by: STUDENT IN AN ORGANIZED HEALTH CARE EDUCATION/TRAINING PROGRAM

## 2025-03-18 RX ORDER — BUDESONIDE AND FORMOTEROL FUMARATE DIHYDRATE 160; 4.5 UG/1; UG/1
2 AEROSOL RESPIRATORY (INHALATION)
Status: DISCONTINUED | OUTPATIENT
Start: 2025-03-18 | End: 2025-03-19 | Stop reason: HOSPADM

## 2025-03-18 RX ORDER — DIPHENHYDRAMINE HCL 25 MG
25 TABLET ORAL
Status: DISCONTINUED | OUTPATIENT
Start: 2025-03-18 | End: 2025-03-19

## 2025-03-18 RX ORDER — DIPHENHYDRAMINE HCL 25 MG
25 TABLET ORAL
Status: DISCONTINUED | OUTPATIENT
Start: 2025-03-18 | End: 2025-03-19 | Stop reason: HOSPADM

## 2025-03-18 RX ADMIN — CALCITRIOL CAPSULES 0.25 MCG 0.5 MCG: 0.25 CAPSULE ORAL at 10:17

## 2025-03-18 RX ADMIN — HYDRALAZINE HYDROCHLORIDE 25 MG: 25 TABLET ORAL at 20:59

## 2025-03-18 RX ADMIN — OXYCODONE HYDROCHLORIDE AND ACETAMINOPHEN 1 TABLET: 7.5; 325 TABLET ORAL at 18:57

## 2025-03-18 RX ADMIN — CALCITRIOL CAPSULES 0.25 MCG 0.5 MCG: 0.25 CAPSULE ORAL at 20:58

## 2025-03-18 RX ADMIN — ROPINIROLE HYDROCHLORIDE 0.25 MG: 0.25 TABLET, FILM COATED ORAL at 20:58

## 2025-03-18 RX ADMIN — SODIUM CHLORIDE, PRESERVATIVE FREE 10 ML: 5 INJECTION INTRAVENOUS at 10:18

## 2025-03-18 RX ADMIN — WATER 125 MG: 1 INJECTION INTRAMUSCULAR; INTRAVENOUS; SUBCUTANEOUS at 12:14

## 2025-03-18 RX ADMIN — CLOPIDOGREL BISULFATE 75 MG: 75 TABLET, FILM COATED ORAL at 10:17

## 2025-03-18 RX ADMIN — FUROSEMIDE 40 MG: 40 TABLET ORAL at 10:17

## 2025-03-18 RX ADMIN — BUDESONIDE AND FORMOTEROL FUMARATE DIHYDRATE 2 PUFF: 160; 4.5 AEROSOL RESPIRATORY (INHALATION) at 23:29

## 2025-03-18 RX ADMIN — OXYCODONE HYDROCHLORIDE AND ACETAMINOPHEN 1 TABLET: 7.5; 325 TABLET ORAL at 10:17

## 2025-03-18 RX ADMIN — DIPHENHYDRAMINE HYDROCHLORIDE 25 MG: 25 TABLET ORAL at 20:58

## 2025-03-18 RX ADMIN — PANTOPRAZOLE SODIUM 40 MG: 40 TABLET, DELAYED RELEASE ORAL at 10:17

## 2025-03-18 RX ADMIN — ASPIRIN 81 MG: 81 TABLET, CHEWABLE ORAL at 10:17

## 2025-03-18 RX ADMIN — EMPAGLIFLOZIN 10 MG: 10 TABLET, FILM COATED ORAL at 10:17

## 2025-03-18 RX ADMIN — ONDANSETRON 4 MG: 2 INJECTION INTRAMUSCULAR; INTRAVENOUS at 12:14

## 2025-03-18 RX ADMIN — CALCIUM CARBONATE 1000 MG: 500 TABLET, CHEWABLE ORAL at 20:59

## 2025-03-18 RX ADMIN — ATORVASTATIN CALCIUM 40 MG: 40 TABLET, FILM COATED ORAL at 20:59

## 2025-03-18 RX ADMIN — HEPARIN SODIUM 5000 UNITS: 5000 INJECTION INTRAVENOUS; SUBCUTANEOUS at 16:58

## 2025-03-18 RX ADMIN — OSELTAMAVIR PHOSPHATE 30 MG: 30 CAPSULE ORAL at 16:58

## 2025-03-18 RX ADMIN — CALCIUM CARBONATE 1000 MG: 500 TABLET, CHEWABLE ORAL at 10:18

## 2025-03-18 RX ADMIN — HYDRALAZINE HYDROCHLORIDE 25 MG: 25 TABLET ORAL at 10:17

## 2025-03-18 RX ADMIN — INSULIN GLARGINE 10 UNITS: 100 INJECTION, SOLUTION SUBCUTANEOUS at 20:58

## 2025-03-18 RX ADMIN — ALBUTEROL SULFATE 2 PUFF: 90 AEROSOL, METERED RESPIRATORY (INHALATION) at 01:16

## 2025-03-18 RX ADMIN — INSULIN LISPRO 8 UNITS: 100 INJECTION, SOLUTION INTRAVENOUS; SUBCUTANEOUS at 20:57

## 2025-03-18 RX ADMIN — HEPARIN SODIUM 5000 UNITS: 5000 INJECTION INTRAVENOUS; SUBCUTANEOUS at 10:17

## 2025-03-18 RX ADMIN — METOPROLOL SUCCINATE 25 MG: 25 TABLET, EXTENDED RELEASE ORAL at 10:17

## 2025-03-18 ASSESSMENT — PAIN DESCRIPTION - DESCRIPTORS
DESCRIPTORS: SHARP
DESCRIPTORS: SHARP
DESCRIPTORS: ACHING

## 2025-03-18 ASSESSMENT — PAIN DESCRIPTION - LOCATION
LOCATION: FOOT
LOCATION: BACK;FOOT;LEG
LOCATION: BACK;FOOT;LEG

## 2025-03-18 ASSESSMENT — PAIN SCALES - GENERAL
PAINLEVEL_OUTOF10: 7
PAINLEVEL_OUTOF10: 6
PAINLEVEL_OUTOF10: 7

## 2025-03-18 ASSESSMENT — PAIN DESCRIPTION - ORIENTATION
ORIENTATION: RIGHT

## 2025-03-18 ASSESSMENT — PAIN - FUNCTIONAL ASSESSMENT: PAIN_FUNCTIONAL_ASSESSMENT: ACTIVITIES ARE NOT PREVENTED

## 2025-03-18 ASSESSMENT — PAIN DESCRIPTION - FREQUENCY: FREQUENCY: CONTINUOUS

## 2025-03-18 ASSESSMENT — PAIN DESCRIPTION - PAIN TYPE: TYPE: CHRONIC PAIN;ACUTE PAIN

## 2025-03-18 NOTE — DIALYSIS
03/17/25  1213 03/18/25  0307   WBC 3.2* 3.4*   HGB 7.9* 7.5*   HCT 24.6* 23.1*                                                                  Recent Labs     03/17/25  1213 03/18/25  0307    137   K 4.7 4.1   CL 94* 96*   CO2 18* 24   * 69*   CREATININE 10.2* 7.6*   GLUCOSE 187* 128*     IV Drips and Rate/Dose   sodium chloride      dextrose      dextrose        Safety - Before each treatment:   Dialysis Machine No.: [id number]   Machine Number: [id number]  Dialyzer Lot No.: [id number]  RO Machine Log Sheet Completed: Yes  Machine Alarm Self Test: Completed;Passed (03/18/25 1233)     Air Foam Detector: Tested, Proper Function, pH Reading  Extracorporeal Circuit Tested for Integrity: Yes  Machine Conductivity: 13.7  Manual Conductivity: 13.8     Bicarbonate Concentrate Lot No.: [id number]  Acid Concentrate Lot No.: [id number]  Manual Ph: 7.4  Bleach Test (Neg): Yes  Bath Temperature: 96.8 °F (36 °C)  Tubing Lot#: [id number]  Conductivity Meter Serial #: [id number]  All Connections Secure?: Yes  Venous Parameters Set?: Yes  Arterial Parameters Set?: Yes  Saline Line Double Clamped?: Yes  Air Foam Detector Engaged?: Yes  Machine Functioning Alarm Free? Yes  Prime Given: 200ml    Chlorine Testing - Before each treatment and every 4 hours:   Treatment  Time On: 1241  Treatment Goal: 3.5 in 4hr     1st check: less than 0.1 ppm at: 1050 hours  2nd check: less than 0.1 ppm at: 1450 hours  3rd check: Not Applicable  (if greater than 0.1 ppm, then check every 30 minutes from secondary)    Access Flows and Pressures  Patient Vitals for the past 8 hrs:   Blood Flow Rate (ml/min) Ultrafiltration Rate (ml/hr) Ultrafiltration Removed (ml) Arterial Pressure (mmHg) Venous Pressure (mmHg) TMP DFR Comments Access Visible   03/18/25 1241 350 ml/min 1000 ml/hr 0 ml -120 mmHg 170 70 700 txt started Yes   03/18/25 1245 350 ml/min 1000 ml/hr 118 ml -130 mmHg 170 80 700 resting Yes   03/18/25 1300 350 ml/min 1000 ml/hr 390 ml -120 mmHg

## 2025-03-18 NOTE — PLAN OF CARE
Problem: Chronic Conditions and Co-morbidities  Goal: Patient's chronic conditions and co-morbidity symptoms are monitored and maintained or improved  3/18/2025 1012 by Diana Luois RN  Outcome: Progressing  3/18/2025 0408 by Andrzej Obrien RN  Outcome: Progressing     Problem: Discharge Planning  Goal: Discharge to home or other facility with appropriate resources  3/18/2025 1012 by Diana Louis RN  Outcome: Progressing  3/18/2025 0408 by Andrzej Obrien RN  Outcome: Progressing  Flowsheets (Taken 3/18/2025 0244)  Discharge to home or other facility with appropriate resources:   Identify barriers to discharge with patient and caregiver   Arrange for needed discharge resources and transportation as appropriate     Problem: Pain  Goal: Verbalizes/displays adequate comfort level or baseline comfort level  3/18/2025 1012 by Diana Louis RN  Outcome: Progressing  3/18/2025 0408 by Andrzej Obrien RN  Outcome: Progressing     Problem: Safety - Adult  Goal: Free from fall injury  3/18/2025 1012 by Diana Louis RN  Outcome: Progressing  3/18/2025 0408 by Andrzej Obrien RN  Outcome: Progressing     Problem: Skin/Tissue Integrity  Goal: Skin integrity remains intact  Description: 1.  Monitor for areas of redness and/or skin breakdown  2.  Assess vascular access sites hourly  3.  Every 4-6 hours minimum:  Change oxygen saturation probe site  4.  Every 4-6 hours:  If on nasal continuous positive airway pressure, respiratory therapy assess nares and determine need for appliance change or resting period  Outcome: Progressing  Flowsheets  Taken 3/18/2025 0250 by Andrzej Obrien RN  Skin Integrity Remains Intact: Monitor for areas of redness and/or skin breakdown  Taken 3/18/2025 0237 by Andrzej Obrien RN  Skin Integrity Remains Intact: Monitor for areas of redness and/or skin breakdown  Taken 3/17/2025 2300 by Andrzej Obrien RN  Skin Integrity Remains Intact:   Monitor for areas of redness

## 2025-03-18 NOTE — PLAN OF CARE
Problem: Chronic Conditions and Co-morbidities  Goal: Patient's chronic conditions and co-morbidity symptoms are monitored and maintained or improved  Outcome: Progressing     Problem: Discharge Planning  Goal: Discharge to home or other facility with appropriate resources  Outcome: Progressing  Flowsheets (Taken 3/18/2025 0244)  Discharge to home or other facility with appropriate resources:   Identify barriers to discharge with patient and caregiver   Arrange for needed discharge resources and transportation as appropriate     Problem: Pain  Goal: Verbalizes/displays adequate comfort level or baseline comfort level  Outcome: Progressing     Problem: Safety - Adult  Goal: Free from fall injury  Outcome: Progressing     Problem: Skin/Tissue Integrity  Goal: Skin integrity remains intact  Description: 1.  Monitor for areas of redness and/or skin breakdown  2.  Assess vascular access sites hourly  3.  Every 4-6 hours minimum:  Change oxygen saturation probe site  4.  Every 4-6 hours:  If on nasal continuous positive airway pressure, respiratory therapy assess nares and determine need for appliance change or resting period  Recent Flowsheet Documentation  Taken 3/18/2025 0250 by Andrzej Obrien, RN  Skin Integrity Remains Intact: Monitor for areas of redness and/or skin breakdown  Taken 3/18/2025 0237 by Andrzej Obrien, RN  Skin Integrity Remains Intact: Monitor for areas of redness and/or skin breakdown  Taken 3/17/2025 2300 by Andrzej Obrien, RN  Skin Integrity Remains Intact:   Monitor for areas of redness and/or skin breakdown   Assess vascular access sites hourly

## 2025-03-18 NOTE — CONSULTS
Elder Betancur MD at [facility] OR    SCROTAL SURGERY N/A 05/16/2021    SCROTAL RE-DEBRIDEMENT  INCISION AND DRAINAGE performed by Elder Betancur MD at [facility] OR    SKIN BIOPSY N/A 06/18/2013    sebaceous cyst removal times 4     TOE AMPUTATION      right great toe    TOE AMPUTATION Right 03/23/2019    TOE AMPUTATION RIGHT 5TH TOE performed by Jose Caba DPM at [facility] OR    TOE AMPUTATION Right 08/06/2019    TOE AMPUTATION RIGHT 4TH TOE performed by Jose Caba DPM at [facility] OR    TOE AMPUTATION Left 9/6/2023    GREAT TOE TRANSMETATARSAL AMPUTATION performed by Sammy Hsieh MD at [facility] OR    TOE AMPUTATION Left 4/23/2024    left 5th TOE AMPUTATION performed by Claudy Forrest DO at [facility] OR    TONSILLECTOMY AND ADENOIDECTOMY  1988    UPPER GASTROINTESTINAL ENDOSCOPY  06/02/2011    Mild gastritis with moderatley severe antritis, small hiatal hernia, Dr. Medina    UPPER GASTROINTESTINAL ENDOSCOPY N/A 01/12/2019    EGD BIOPSY performed by Jose Maria Cornelius MD at [facility] ENDOSCOPY    UPPER GASTROINTESTINAL ENDOSCOPY N/A 07/26/2022    EGD DIAGNOSTIC ONLY performed by Jose Maria Cornelius MD at [facility] ENDOSCOPY    UPPER GASTROINTESTINAL ENDOSCOPY N/A 9/4/2023    EGD BIOPSY performed by Mia AMBRIZ MD at [facility] ENDOSCOPY       Current Medications:    Current Facility-Administered Medications: [START ON 3/19/2025] epoetin paola-epbx (RETACRIT) injection 10,000 Units, 10,000 Units, IntraVENous, Once per day on Monday Wednesday Friday  budesonide-formoterol (SYMBICORT) 160-4.5 MCG/ACT inhaler 2 puff, 2 puff, Inhalation, BID RT  tiotropium (SPIRIVA RESPIMAT) 2.5 MCG/ACT inhaler 2 puff, 2 puff, Inhalation, Daily RT  methylPREDNISolone sodium succ (SOLU-MEDROL) 125 mg in sterile water 2 mL injection, 125 mg, IntraVENous, Once  albuterol sulfate HFA (PROVENTIL;VENTOLIN;PROAIR) 108 (90 Base) MCG/ACT inhaler 2 puff, 2 puff, Inhalation, 4x Daily PRN  aspirin chewable tablet 81 mg, 81 mg, Oral, Daily  atorvastatin (LIPITOR) tablet 40 mg, 40 mg, 
Father     High Blood Pressure Father     Obesity Father     Kidney Disease Father     Diabetes Sister     High Blood Pressure Sister     Mental Illness Sister     Obesity Sister     High Blood Pressure Other     Mental Illness Other         bipolar    Other Son         cyst in ear canal    ADHD Daughter      Physical Exam:    Vitals: BP (!) 172/88   Pulse 75   Temp 98.4 °F (36.9 °C) (Oral)   Resp 20   SpO2 98%   General appearance: in no acute distress, appears stated age  Skin: Skin color, texture, turgor normal. No rashes or lesions  HEENT: normocephalic, atraumatic  Neck: supple, trachea midline  Lungs: breathing comfortably on NC  Heart:: regular rate and rhythm,  Abdomen: non distended  Extremities: extremities normal, atraumatic, no cyanosis or edema/LUE AVF  Neurologic: Mental status: alert, oriented, interactive, following commands  Psychiatric: mood and affect appropriate     CBC:   Recent Labs     03/17/25  1213 03/18/25  0307   WBC 3.2* 3.4*   HGB 7.9* 7.5*     BMP:    Recent Labs     03/17/25  1213 03/18/25  0307    137   K 4.7 4.1   CL 94* 96*   CO2 18* 24   * 69*   CREATININE 10.2* 7.6*   GLUCOSE 187* 128*     Hepatic:   Recent Labs     03/17/25  1213 03/18/25  0307   AST 26 26   ALT 29 23   BILITOT 0.4 0.4   ALKPHOS 75 72              Electronically signed by Melody James DO on 3/18/2025 at 7:16 AM    [facility]  MD SAMANTHA SMITH DO  [address] [facility],  [address] [place] [address]  PHONE: [phone]  FAX: [phone]

## 2025-03-19 VITALS
OXYGEN SATURATION: 97 % | SYSTOLIC BLOOD PRESSURE: 146 MMHG | DIASTOLIC BLOOD PRESSURE: 80 MMHG | TEMPERATURE: 97.8 F | RESPIRATION RATE: 16 BRPM | HEART RATE: 67 BPM

## 2025-03-19 LAB
ALBUMIN SERPL-MCNC: 4 G/DL (ref 3.4–5)
ALBUMIN/GLOB SERPL: 1.2 {RATIO} (ref 1.1–2.2)
ALP SERPL-CCNC: 78 U/L (ref 40–129)
ALT SERPL-CCNC: 22 U/L (ref 10–40)
ANION GAP SERPL CALCULATED.3IONS-SCNC: 16 MMOL/L (ref 9–17)
AST SERPL-CCNC: 23 U/L (ref 15–37)
BASOPHILS # BLD: 0.01 K/UL
BASOPHILS NFR BLD: 0 % (ref 0–1)
BILIRUB SERPL-MCNC: 0.5 MG/DL (ref 0–1)
BUN SERPL-MCNC: 62 MG/DL (ref 7–20)
CALCIUM SERPL-MCNC: 8.4 MG/DL (ref 8.3–10.6)
CHLORIDE SERPL-SCNC: 96 MMOL/L (ref 99–110)
CO2 SERPL-SCNC: 25 MMOL/L (ref 21–32)
CREAT SERPL-MCNC: 6.2 MG/DL (ref 0.9–1.3)
EKG ATRIAL RATE: 70 BPM
EKG DIAGNOSIS: NORMAL
EKG P AXIS: 45 DEGREES
EKG P-R INTERVAL: 190 MS
EKG Q-T INTERVAL: 450 MS
EKG QRS DURATION: 90 MS
EKG QTC CALCULATION (BAZETT): 486 MS
EKG R AXIS: 53 DEGREES
EKG T AXIS: 55 DEGREES
EKG VENTRICULAR RATE: 70 BPM
EOSINOPHIL # BLD: 0 K/UL
EOSINOPHILS RELATIVE PERCENT: 0 % (ref 0–3)
ERYTHROCYTE [DISTWIDTH] IN BLOOD BY AUTOMATED COUNT: 15.2 % (ref 11.7–14.9)
GFR, ESTIMATED: 10 ML/MIN/1.73M2
GLUCOSE BLD-MCNC: 173 MG/DL (ref 74–99)
GLUCOSE BLD-MCNC: 186 MG/DL (ref 74–99)
GLUCOSE BLD-MCNC: 192 MG/DL (ref 74–99)
GLUCOSE BLD-MCNC: 216 MG/DL (ref 74–99)
GLUCOSE BLD-MCNC: 364 MG/DL (ref 74–99)
GLUCOSE SERPL-MCNC: 215 MG/DL (ref 74–99)
HCT VFR BLD AUTO: 23.6 % (ref 42–52)
HGB BLD-MCNC: 7.7 G/DL (ref 13.5–18)
IMM GRANULOCYTES # BLD AUTO: 0.07 K/UL
IMM GRANULOCYTES NFR BLD: 2 %
LYMPHOCYTES NFR BLD: 0.31 K/UL
LYMPHOCYTES RELATIVE PERCENT: 7 % (ref 24–44)
MCH RBC QN AUTO: 31 PG (ref 27–31)
MCHC RBC AUTO-ENTMCNC: 32.6 G/DL (ref 32–36)
MCV RBC AUTO: 95.2 FL (ref 78–100)
MONOCYTES NFR BLD: 0.48 K/UL
MONOCYTES NFR BLD: 10 % (ref 0–4)
NEUTROPHILS NFR BLD: 81 % (ref 36–66)
NEUTS SEG NFR BLD: 3.78 K/UL
PLATELET CONFIRMATION: NORMAL
PLATELET, FLUORESCENCE: 83 K/UL (ref 140–440)
PMV BLD AUTO: 11.1 FL (ref 7.5–11.1)
POTASSIUM SERPL-SCNC: 4.5 MMOL/L (ref 3.5–5.1)
PROT SERPL-MCNC: 7.4 G/DL (ref 6.4–8.2)
RBC # BLD AUTO: 2.48 M/UL (ref 4.6–6.2)
SODIUM SERPL-SCNC: 136 MMOL/L (ref 136–145)
WBC OTHER # BLD: 4.7 K/UL (ref 4–10.5)

## 2025-03-19 PROCEDURE — 94761 N-INVAS EAR/PLS OXIMETRY MLT: CPT

## 2025-03-19 PROCEDURE — 94640 AIRWAY INHALATION TREATMENT: CPT

## 2025-03-19 PROCEDURE — 6370000000 HC RX 637 (ALT 250 FOR IP): Performed by: STUDENT IN AN ORGANIZED HEALTH CARE EDUCATION/TRAINING PROGRAM

## 2025-03-19 PROCEDURE — 6360000002 HC RX W HCPCS: Performed by: INTERNAL MEDICINE

## 2025-03-19 PROCEDURE — 85025 COMPLETE CBC W/AUTO DIFF WBC: CPT

## 2025-03-19 PROCEDURE — 80053 COMPREHEN METABOLIC PANEL: CPT

## 2025-03-19 PROCEDURE — 2700000000 HC OXYGEN THERAPY PER DAY

## 2025-03-19 PROCEDURE — 82962 GLUCOSE BLOOD TEST: CPT

## 2025-03-19 PROCEDURE — 2500000003 HC RX 250 WO HCPCS: Performed by: STUDENT IN AN ORGANIZED HEALTH CARE EDUCATION/TRAINING PROGRAM

## 2025-03-19 PROCEDURE — 36415 COLL VENOUS BLD VENIPUNCTURE: CPT

## 2025-03-19 PROCEDURE — 93010 ELECTROCARDIOGRAM REPORT: CPT | Performed by: INTERNAL MEDICINE

## 2025-03-19 PROCEDURE — 6370000000 HC RX 637 (ALT 250 FOR IP): Performed by: INTERNAL MEDICINE

## 2025-03-19 PROCEDURE — 94762 N-INVAS EAR/PLS OXIMTRY CONT: CPT

## 2025-03-19 PROCEDURE — 6360000002 HC RX W HCPCS: Performed by: STUDENT IN AN ORGANIZED HEALTH CARE EDUCATION/TRAINING PROGRAM

## 2025-03-19 RX ORDER — INSULIN GLARGINE 100 [IU]/ML
15 INJECTION, SOLUTION SUBCUTANEOUS NIGHTLY
Qty: 4.5 ML | Refills: 0 | Status: SHIPPED | OUTPATIENT
Start: 2025-03-19 | End: 2025-04-18

## 2025-03-19 RX ORDER — BUDESONIDE AND FORMOTEROL FUMARATE DIHYDRATE 160; 4.5 UG/1; UG/1
2 AEROSOL RESPIRATORY (INHALATION)
Qty: 10.2 G | Refills: 3 | Status: SHIPPED | OUTPATIENT
Start: 2025-03-19

## 2025-03-19 RX ORDER — POLYETHYLENE GLYCOL 3350 17 G/17G
17 POWDER, FOR SOLUTION ORAL DAILY PRN
Qty: 30 PACKET | Refills: 0 | Status: SHIPPED | OUTPATIENT
Start: 2025-03-19 | End: 2025-04-18

## 2025-03-19 RX ORDER — HEPARIN SODIUM 1000 [USP'U]/ML
2000 INJECTION, SOLUTION INTRAVENOUS; SUBCUTANEOUS
Status: DISCONTINUED | OUTPATIENT
Start: 2025-03-19 | End: 2025-03-19

## 2025-03-19 RX ORDER — HEPARIN SODIUM 1000 [USP'U]/ML
2000 INJECTION, SOLUTION INTRAVENOUS; SUBCUTANEOUS
Status: COMPLETED | OUTPATIENT
Start: 2025-03-19 | End: 2025-03-19

## 2025-03-19 RX ORDER — FUROSEMIDE 40 MG/1
40 TABLET ORAL DAILY
Qty: 30 TABLET | Refills: 1 | Status: SHIPPED | OUTPATIENT
Start: 2025-03-19 | End: 2025-04-18

## 2025-03-19 RX ORDER — OSELTAMIVIR PHOSPHATE 30 MG/1
30 CAPSULE ORAL DAILY
Qty: 1 CAPSULE | Refills: 0 | Status: SHIPPED | OUTPATIENT
Start: 2025-03-22 | End: 2025-03-23

## 2025-03-19 RX ADMIN — OXYCODONE HYDROCHLORIDE AND ACETAMINOPHEN 1 TABLET: 7.5; 325 TABLET ORAL at 06:11

## 2025-03-19 RX ADMIN — EMPAGLIFLOZIN 10 MG: 10 TABLET, FILM COATED ORAL at 09:35

## 2025-03-19 RX ADMIN — HYDRALAZINE HYDROCHLORIDE 25 MG: 25 TABLET ORAL at 09:34

## 2025-03-19 RX ADMIN — METOPROLOL SUCCINATE 25 MG: 25 TABLET, EXTENDED RELEASE ORAL at 09:35

## 2025-03-19 RX ADMIN — HEPARIN SODIUM 5000 UNITS: 5000 INJECTION INTRAVENOUS; SUBCUTANEOUS at 06:11

## 2025-03-19 RX ADMIN — INSULIN LISPRO 2 UNITS: 100 INJECTION, SOLUTION INTRAVENOUS; SUBCUTANEOUS at 16:58

## 2025-03-19 RX ADMIN — OXYCODONE HYDROCHLORIDE AND ACETAMINOPHEN 1 TABLET: 7.5; 325 TABLET ORAL at 16:38

## 2025-03-19 RX ADMIN — INSULIN LISPRO 2 UNITS: 100 INJECTION, SOLUTION INTRAVENOUS; SUBCUTANEOUS at 09:52

## 2025-03-19 RX ADMIN — ASPIRIN 81 MG: 81 TABLET, CHEWABLE ORAL at 09:35

## 2025-03-19 RX ADMIN — CLOPIDOGREL BISULFATE 75 MG: 75 TABLET, FILM COATED ORAL at 09:35

## 2025-03-19 RX ADMIN — PANTOPRAZOLE SODIUM 40 MG: 40 TABLET, DELAYED RELEASE ORAL at 09:35

## 2025-03-19 RX ADMIN — HEPARIN SODIUM 2000 UNITS: 1000 INJECTION INTRAVENOUS; SUBCUTANEOUS at 13:04

## 2025-03-19 RX ADMIN — EPOETIN ALFA-EPBX 10000 UNITS: 10000 INJECTION, SOLUTION INTRAVENOUS; SUBCUTANEOUS at 13:04

## 2025-03-19 RX ADMIN — FUROSEMIDE 40 MG: 40 TABLET ORAL at 09:34

## 2025-03-19 RX ADMIN — BUDESONIDE AND FORMOTEROL FUMARATE DIHYDRATE 2 PUFF: 160; 4.5 AEROSOL RESPIRATORY (INHALATION) at 09:04

## 2025-03-19 RX ADMIN — SODIUM CHLORIDE, PRESERVATIVE FREE 10 ML: 5 INJECTION INTRAVENOUS at 09:35

## 2025-03-19 RX ADMIN — TIOTROPIUM BROMIDE INHALATION SPRAY 2 PUFF: 3.12 SPRAY, METERED RESPIRATORY (INHALATION) at 09:04

## 2025-03-19 ASSESSMENT — PAIN DESCRIPTION - DESCRIPTORS
DESCRIPTORS: ACHING
DESCRIPTORS: SHARP

## 2025-03-19 ASSESSMENT — PAIN SCALES - GENERAL
PAINLEVEL_OUTOF10: 7
PAINLEVEL_OUTOF10: 8

## 2025-03-19 ASSESSMENT — PAIN DESCRIPTION - ORIENTATION
ORIENTATION: RIGHT
ORIENTATION: RIGHT

## 2025-03-19 ASSESSMENT — PAIN DESCRIPTION - LOCATION
LOCATION: FOOT
LOCATION: FOOT;LEG

## 2025-03-19 NOTE — CARE COORDINATION
[facility] Liaison spoke with pt and is aware of discharge & will initiate HHC.   
Therapy;Home Care   Patient expects to be discharged to: House   Services At/After Discharge   Transition of Care Consult (CM Consult) Home Health   Internal Home Health Yes   Services At/After Discharge None   Condition of Participation: Discharge Planning   The Patient and/or Patient Representative was provided with a Choice of Provider? Patient   The Patient and/Or Patient Representative agree with the Discharge Plan? Yes   Freedom of Choice list was provided with basic dialogue that supports the patient's individualized plan of care/goals, treatment preferences, and shares the quality data associated with the providers?  Yes

## 2025-03-19 NOTE — DISCHARGE INSTRUCTIONS
Applications can also be found at your Local Energy Assistance Provider, local libraries, [facility] departments of Job and Family Services, and [facility] Agencies on Aging.
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
## 2025-03-19 NOTE — DISCHARGE SUMMARY
V2.0  Discharge Summary    Name:  Yovanny Levine /Age/Sex: 1975 (49 y.o. male)   Admit Date: 3/17/2025  Discharge Date: 3/19/25    MRN & CSN:  [id number] & [id number] Encounter Date and Time 3/19/25 2:03 PM EDT    Attending:  Bruno Boyle,* Discharging Provider: Bruno Allen MD       Hospital Course:     Brief HPI: Yovanny Levine is a 49 y.o. male who presented with generalized weakness x 3 days. He also has cough that is nonproductive as well as diarrhea . Denies hematochezia or vomiting. Also reports feeling shortness of breath. Has history of congestive heart failure as well as heart catheterization he also has chest tightness without radiation. Denies any abdominal pain. Has been keeping up with eating and drinking as best he can also has a history of end-stage renal disease on dialysis and is oliguric. Found to have influenza A. Also missed his HD     Brief Problem Based Course:    Patient doing well. Vitals remain stable. Discussed with Nephro - OK to DC after HD. Patient to get last dose of tamiflu on Saturday. Recommended to follow up with Pulm for sleep study       Influenza A  Volume overload-Resolved  ESRD on HD MWF  HFrEF, compensated  Reported worsening SOB and orthopnea over past 3 days.   Clinically hypervolemic. Pro-BNP >70k.   CXR with pulmonary congestion.   Nephrology consulted  Admit to med surg with tele  Started on Eri flu - renal adjusted  On 2 L oxygen which is his base line   Getting HD today. Pulm consulted      # CAD s/p PCI to pLAD in 2024  # Non-MI chronic troponin elevation  - Denied any typical CP.   - Initial Tn elevated, but stable compared to previous levels. ECG without acute ischemic changes.  - Continue ASA, Plavix, Lipitor, Toprol-XL and Imdur.     # Hx of VT in 2024  - Continue Toprol-XL.   - Monitor electrolytes.       # Chronic left foot osteomyelitis  - Following with wound care. Recently evaluated by ID, started on 2-week course of

## 2025-03-20 ENCOUNTER — HOSPITAL ENCOUNTER (OUTPATIENT)
Dept: WOUND CARE | Age: 50
Discharge: HOME | End: 2025-03-20
Attending: STUDENT IN AN ORGANIZED HEALTH CARE EDUCATION/TRAINING PROGRAM

## 2025-03-20 NOTE — PATIENT INSTRUCTIONS
PHYSICIAN ORDERS AND DISCHARGE INSTRUCTIONS     Wound Care Notes:  Sees Dr Kumar.  surgical shoes with pegassist to offload right plantar wound 3/13/25                  Applied for Kerecis grafts on 24  Donated Kerecis applied to left lateral foot 24                             Orders for this week: 3/20/2025    Right Plantar: Wash with soap and water. Pat dry  Apply stimulen powder to wound bed   Pack with betadine gauze  Offload with qwick to periwound  Cover with zetuvit   Wrap w/ coban 2 lites  Leave in place for 1 week    Left hand 3rd finger: Wash with soap and water, pat dry  Apply silvadene and stimulen to wound bed   Cover with conform and coban to finger   Change daily      Abdomen: Wash with soap and water, pat dry.  Shave around area   Santyl, Gentamicin, lidocaine, and stimulen powder  Cover with ca alginate and gentac w/ tegaderm  Change Daily       Leave in place til next visit to Wound Care Center    Plan: HBO workup started 9/10/24.   [facility] discharge due to not home bound 24.   Toenails 2025.        Follow Up Instructions: 1 weeks  Primary Wound Care Provider: Shelly Rubalcava CNP  Call  for any questions or concerns.  Central [phone] for imaging lab work

## 2025-04-03 ENCOUNTER — HOSPITAL ENCOUNTER (OUTPATIENT)
Dept: WOUND CARE | Age: 50
Discharge: HOME | End: 2025-04-03
Attending: STUDENT IN AN ORGANIZED HEALTH CARE EDUCATION/TRAINING PROGRAM
Payer: MEDICARE

## 2025-04-03 VITALS
SYSTOLIC BLOOD PRESSURE: 116 MMHG | DIASTOLIC BLOOD PRESSURE: 65 MMHG | TEMPERATURE: 98.1 F | HEART RATE: 87 BPM | RESPIRATION RATE: 18 BRPM

## 2025-04-03 DIAGNOSIS — Z89.422: ICD-10-CM

## 2025-04-03 DIAGNOSIS — L08.9 TYPE 2 DIABETES MELLITUS WITH RIGHT DIABETIC FOOT INFECTION (HCC): ICD-10-CM

## 2025-04-03 DIAGNOSIS — L98.492 ULCER OF ABDOMEN WALL WITH FAT LAYER EXPOSED (HCC): Primary | ICD-10-CM

## 2025-04-03 DIAGNOSIS — Z79.4 TYPE 2 DIABETES MELLITUS WITH OTHER SKIN ULCER, WITH LONG-TERM CURRENT USE OF INSULIN: ICD-10-CM

## 2025-04-03 DIAGNOSIS — L84 PRE-ULCERATIVE CALLUSES: ICD-10-CM

## 2025-04-03 DIAGNOSIS — E11.622 TYPE 2 DIABETES MELLITUS WITH OTHER SKIN ULCER, WITH LONG-TERM CURRENT USE OF INSULIN: ICD-10-CM

## 2025-04-03 DIAGNOSIS — E11.628 TYPE 2 DIABETES MELLITUS WITH RIGHT DIABETIC FOOT INFECTION (HCC): ICD-10-CM

## 2025-04-03 PROCEDURE — 11056 PARNG/CUTG B9 HYPRKR LES 2-4: CPT

## 2025-04-03 PROCEDURE — 16020 DRESS/DEBRID P-THICK BURN S: CPT

## 2025-04-03 PROCEDURE — 6370000000 HC RX 637 (ALT 250 FOR IP): Performed by: SURGERY

## 2025-04-03 PROCEDURE — 97597 DBRDMT OPN WND 1ST 20 CM/<: CPT

## 2025-04-03 PROCEDURE — 11042 DBRDMT SUBQ TIS 1ST 20SQCM/<: CPT

## 2025-04-03 RX ORDER — BETAMETHASONE DIPROPIONATE 0.5 MG/G
CREAM TOPICAL ONCE
OUTPATIENT
Start: 2025-04-03 | End: 2025-04-03

## 2025-04-03 RX ORDER — LIDOCAINE 40 MG/G
CREAM TOPICAL ONCE
OUTPATIENT
Start: 2025-04-03 | End: 2025-04-03

## 2025-04-03 RX ORDER — MUPIROCIN 20 MG/G
OINTMENT TOPICAL ONCE
OUTPATIENT
Start: 2025-04-03 | End: 2025-04-03

## 2025-04-03 RX ORDER — CLOBETASOL PROPIONATE 0.5 MG/G
OINTMENT TOPICAL ONCE
OUTPATIENT
Start: 2025-04-03 | End: 2025-04-03

## 2025-04-03 RX ORDER — TRIAMCINOLONE ACETONIDE 1 MG/G
OINTMENT TOPICAL ONCE
Status: CANCELLED | OUTPATIENT
Start: 2025-04-03 | End: 2025-04-03

## 2025-04-03 RX ORDER — NEOMYCIN/BACITRACIN/POLYMYXINB 3.5-400-5K
OINTMENT (GRAM) TOPICAL ONCE
Status: CANCELLED | OUTPATIENT
Start: 2025-04-03 | End: 2025-04-03

## 2025-04-03 RX ORDER — GINSENG 100 MG
CAPSULE ORAL ONCE
OUTPATIENT
Start: 2025-04-03 | End: 2025-04-03

## 2025-04-03 RX ORDER — SODIUM CHLOR/HYPOCHLOROUS ACID 0.033 %
SOLUTION, IRRIGATION IRRIGATION ONCE
OUTPATIENT
Start: 2025-04-03 | End: 2025-04-03

## 2025-04-03 RX ORDER — LIDOCAINE HYDROCHLORIDE 40 MG/ML
SOLUTION TOPICAL ONCE
OUTPATIENT
Start: 2025-04-03 | End: 2025-04-03

## 2025-04-03 RX ORDER — GENTAMICIN SULFATE 1 MG/G
OINTMENT TOPICAL ONCE
OUTPATIENT
Start: 2025-04-03 | End: 2025-04-03

## 2025-04-03 RX ORDER — SILVER SULFADIAZINE 10 MG/G
CREAM TOPICAL ONCE
OUTPATIENT
Start: 2025-04-03 | End: 2025-04-03

## 2025-04-03 RX ORDER — LIDOCAINE HYDROCHLORIDE 20 MG/ML
JELLY TOPICAL ONCE
OUTPATIENT
Start: 2025-04-03 | End: 2025-04-03

## 2025-04-03 RX ORDER — SILVER SULFADIAZINE 10 MG/G
CREAM TOPICAL ONCE
Status: CANCELLED | OUTPATIENT
Start: 2025-04-03 | End: 2025-04-03

## 2025-04-03 RX ORDER — LIDOCAINE HYDROCHLORIDE 20 MG/ML
JELLY TOPICAL ONCE
Status: CANCELLED | OUTPATIENT
Start: 2025-04-03 | End: 2025-04-03

## 2025-04-03 RX ORDER — LIDOCAINE 40 MG/G
CREAM TOPICAL ONCE
Status: CANCELLED | OUTPATIENT
Start: 2025-04-03 | End: 2025-04-03

## 2025-04-03 RX ORDER — LIDOCAINE HYDROCHLORIDE 40 MG/ML
SOLUTION TOPICAL ONCE
Status: CANCELLED | OUTPATIENT
Start: 2025-04-03 | End: 2025-04-03

## 2025-04-03 RX ORDER — BACITRACIN ZINC AND POLYMYXIN B SULFATE 500; 1000 [USP'U]/G; [USP'U]/G
OINTMENT TOPICAL ONCE
OUTPATIENT
Start: 2025-04-03 | End: 2025-04-03

## 2025-04-03 RX ORDER — CLOBETASOL PROPIONATE 0.5 MG/G
OINTMENT TOPICAL ONCE
Status: CANCELLED | OUTPATIENT
Start: 2025-04-03 | End: 2025-04-03

## 2025-04-03 RX ORDER — LIDOCAINE 50 MG/G
OINTMENT TOPICAL ONCE
OUTPATIENT
Start: 2025-04-03 | End: 2025-04-03

## 2025-04-03 RX ORDER — TRIAMCINOLONE ACETONIDE 1 MG/G
OINTMENT TOPICAL ONCE
OUTPATIENT
Start: 2025-04-03 | End: 2025-04-03

## 2025-04-03 RX ORDER — LIDOCAINE 50 MG/G
OINTMENT TOPICAL ONCE
Status: CANCELLED | OUTPATIENT
Start: 2025-04-03 | End: 2025-04-03

## 2025-04-03 RX ORDER — BACITRACIN ZINC AND POLYMYXIN B SULFATE 500; 1000 [USP'U]/G; [USP'U]/G
OINTMENT TOPICAL ONCE
Status: CANCELLED | OUTPATIENT
Start: 2025-04-03 | End: 2025-04-03

## 2025-04-03 RX ORDER — NEOMYCIN/BACITRACIN/POLYMYXINB 3.5-400-5K
OINTMENT (GRAM) TOPICAL ONCE
OUTPATIENT
Start: 2025-04-03 | End: 2025-04-03

## 2025-04-03 RX ORDER — GENTAMICIN SULFATE 1 MG/G
OINTMENT TOPICAL ONCE
Status: COMPLETED | OUTPATIENT
Start: 2025-04-03 | End: 2025-04-03

## 2025-04-03 RX ORDER — BETAMETHASONE DIPROPIONATE 0.5 MG/G
CREAM TOPICAL ONCE
Status: CANCELLED | OUTPATIENT
Start: 2025-04-03 | End: 2025-04-03

## 2025-04-03 RX ORDER — SODIUM CHLOR/HYPOCHLOROUS ACID 0.033 %
SOLUTION, IRRIGATION IRRIGATION ONCE
Status: CANCELLED | OUTPATIENT
Start: 2025-04-03 | End: 2025-04-03

## 2025-04-03 RX ORDER — GINSENG 100 MG
CAPSULE ORAL ONCE
Status: CANCELLED | OUTPATIENT
Start: 2025-04-03 | End: 2025-04-03

## 2025-04-03 RX ORDER — MUPIROCIN 20 MG/G
OINTMENT TOPICAL ONCE
Status: CANCELLED | OUTPATIENT
Start: 2025-04-03 | End: 2025-04-03

## 2025-04-03 RX ADMIN — GENTAMICIN SULFATE: 1 OINTMENT TOPICAL at 11:44

## 2025-04-03 ASSESSMENT — PAIN DESCRIPTION - DESCRIPTORS: DESCRIPTORS: SHARP

## 2025-04-03 ASSESSMENT — PAIN DESCRIPTION - FREQUENCY: FREQUENCY: CONTINUOUS

## 2025-04-03 ASSESSMENT — PAIN DESCRIPTION - LOCATION: LOCATION: FOOT

## 2025-04-03 ASSESSMENT — PAIN SCALES - GENERAL: PAINLEVEL_OUTOF10: 6

## 2025-04-03 ASSESSMENT — PAIN DESCRIPTION - ORIENTATION: ORIENTATION: RIGHT

## 2025-04-03 NOTE — PATIENT INSTRUCTIONS
PHYSICIAN ORDERS AND DISCHARGE INSTRUCTIONS     Wound Care Notes:  Sees Dr Kumar.  surgical shoes with pegassist to offload right plantar wound 3/13/25                  Applied for Kerecis grafts on 24  Donated Kerecis applied to left lateral foot 24                             Orders for this week: 4/3/2025      Abdomen: Wash with soap and water, pat dry.    Apply Gentamicin, Puracol Ag  Cover with ca alginate and gentac w/ tegaderm  Change Daily       Leave in place til next visit to Wound Care Center    Plan: HBO workup started 9/10/24.   [facility] discharge due to not home bound 24.   Toenails 2025.        Follow Up Instructions: 1 weeks  Primary Wound Care Provider: Shelly Rubalcava CNP  Call  for any questions or concerns.  Central [phone] for imaging lab work

## 2025-04-04 PROBLEM — L98.491 ULCER OF ABDOMEN WALL, LIMITED TO BREAKDOWN OF SKIN (HCC): Status: RESOLVED | Noted: 2025-01-16 | Resolved: 2025-04-04

## 2025-04-04 PROBLEM — L98.492 ULCER OF ABDOMEN WALL WITH FAT LAYER EXPOSED (HCC): Status: ACTIVE | Noted: 2025-04-04

## 2025-04-04 NOTE — PROGRESS NOTES
Wound Care Center Progress Visit      Yovanny Levine  AGE: 49 y.o.   GENDER: male  : 1975  EPISODE DATE:  4/3/2025   Referred by: José Luis Izquierdo MD     Subjective:     CHIEF COMPLAINT  WOUND   Problem List Items Addressed This Visit          Endocrine    Type 2 diabetes mellitus with right diabetic foot infection (HCC)    Type 2 diabetes mellitus with skin complication, with long-term current use of insulin (HCC)       Musculoskeletal and Integument    Pre-ulcerative calluses    Ulcer of abdomen wall with fat layer exposed (HCC) - Primary       Other    Absence of toe of left foot     Chief Complaint   Patient presents with    Wound Check        HISTORY of PRESENT ILLNESS      Yovanny Levine is a 49 y.o. male who presents to the Wound Clinic for evaluation and treatment of Chronic diabetic  and  surgical ulcer(s) of the left foot. The condition is of marked severity. The patient has been seeing Dr. Webber at the wound clinic since 24. He was hospitalized -24  Sepsis secondary to left foot cellulitis and chronic left diabetic foot ulcer. General surgery consulted, patient underwent incision and drainage of left foot  and again 2/10/24, IV antibiotics given, home on Augmentin through 3/20/24.  Hospitalized 3/12-3/20/24 with diabetic foot infection, IV antibiotics, switched to oral Cipro. Hospitalized -24 with I&D and foot debridement per Dr. Forrest 24 with wound vac placement. Osteomyelitis, infectious disease consult: plan for vancomycin with dialysis for 6-week cumulative course with end date of 24. The patient has significant underlying medical conditions as below. José Luis Izquierdo MD is PCP.    Wound Pain Timing/Severity: waxing and waning, moderate  Quality of pain: shooting, pins and needles  Severity of pain:  5 / 10   Modifying Factors: diabetes, chronic pressure, shear force, obesity, and non-adherence  Associated Signs/Symptoms: edema and pain    Current wound

## 2025-04-10 ENCOUNTER — HOSPITAL ENCOUNTER (OUTPATIENT)
Dept: WOUND CARE | Age: 50
Discharge: HOME | End: 2025-04-10
Attending: STUDENT IN AN ORGANIZED HEALTH CARE EDUCATION/TRAINING PROGRAM
Payer: MEDICARE

## 2025-04-10 VITALS
RESPIRATION RATE: 18 BRPM | HEART RATE: 86 BPM | SYSTOLIC BLOOD PRESSURE: 135 MMHG | TEMPERATURE: 98.1 F | DIASTOLIC BLOOD PRESSURE: 78 MMHG

## 2025-04-10 DIAGNOSIS — Z79.4 TYPE 2 DIABETES MELLITUS WITH OTHER SKIN ULCER, WITH LONG-TERM CURRENT USE OF INSULIN: ICD-10-CM

## 2025-04-10 DIAGNOSIS — G89.4 CHRONIC PAIN SYNDROME: ICD-10-CM

## 2025-04-10 DIAGNOSIS — N18.6 ESRD (END STAGE RENAL DISEASE) ON DIALYSIS (HCC): ICD-10-CM

## 2025-04-10 DIAGNOSIS — L98.492 ULCER OF ABDOMEN WALL WITH FAT LAYER EXPOSED (HCC): Primary | ICD-10-CM

## 2025-04-10 DIAGNOSIS — E11.622 TYPE 2 DIABETES MELLITUS WITH OTHER SKIN ULCER, WITH LONG-TERM CURRENT USE OF INSULIN: ICD-10-CM

## 2025-04-10 DIAGNOSIS — Z99.2 ESRD (END STAGE RENAL DISEASE) ON DIALYSIS (HCC): ICD-10-CM

## 2025-04-10 DIAGNOSIS — L84 PRE-ULCERATIVE CALLUSES: ICD-10-CM

## 2025-04-10 DIAGNOSIS — Z89.422: ICD-10-CM

## 2025-04-10 PROCEDURE — 11042 DBRDMT SUBQ TIS 1ST 20SQCM/<: CPT

## 2025-04-10 PROCEDURE — 16020 DRESS/DEBRID P-THICK BURN S: CPT

## 2025-04-10 RX ORDER — NEOMYCIN/BACITRACIN/POLYMYXINB 3.5-400-5K
OINTMENT (GRAM) TOPICAL ONCE
OUTPATIENT
Start: 2025-04-10 | End: 2025-04-10

## 2025-04-10 RX ORDER — SODIUM CHLOR/HYPOCHLOROUS ACID 0.033 %
SOLUTION, IRRIGATION IRRIGATION ONCE
OUTPATIENT
Start: 2025-04-10 | End: 2025-04-10

## 2025-04-10 RX ORDER — LIDOCAINE 50 MG/G
OINTMENT TOPICAL ONCE
OUTPATIENT
Start: 2025-04-10 | End: 2025-04-10

## 2025-04-10 RX ORDER — MUPIROCIN 20 MG/G
OINTMENT TOPICAL ONCE
OUTPATIENT
Start: 2025-04-10 | End: 2025-04-10

## 2025-04-10 RX ORDER — OXYCODONE AND ACETAMINOPHEN 7.5; 325 MG/1; MG/1
1 TABLET ORAL EVERY 8 HOURS PRN
Qty: 90 TABLET | Refills: 0 | Status: SHIPPED | OUTPATIENT
Start: 2025-04-10 | End: 2025-05-10

## 2025-04-10 RX ORDER — BETAMETHASONE DIPROPIONATE 0.5 MG/G
CREAM TOPICAL ONCE
OUTPATIENT
Start: 2025-04-10 | End: 2025-04-10

## 2025-04-10 RX ORDER — GINSENG 100 MG
CAPSULE ORAL ONCE
OUTPATIENT
Start: 2025-04-10 | End: 2025-04-10

## 2025-04-10 RX ORDER — TRIAMCINOLONE ACETONIDE 1 MG/G
OINTMENT TOPICAL ONCE
OUTPATIENT
Start: 2025-04-10 | End: 2025-04-10

## 2025-04-10 RX ORDER — LIDOCAINE 40 MG/G
CREAM TOPICAL ONCE
OUTPATIENT
Start: 2025-04-10 | End: 2025-04-10

## 2025-04-10 RX ORDER — BACITRACIN ZINC AND POLYMYXIN B SULFATE 500; 1000 [USP'U]/G; [USP'U]/G
OINTMENT TOPICAL ONCE
OUTPATIENT
Start: 2025-04-10 | End: 2025-04-10

## 2025-04-10 RX ORDER — CLOBETASOL PROPIONATE 0.5 MG/G
OINTMENT TOPICAL ONCE
OUTPATIENT
Start: 2025-04-10 | End: 2025-04-10

## 2025-04-10 RX ORDER — LIDOCAINE HYDROCHLORIDE 20 MG/ML
JELLY TOPICAL ONCE
OUTPATIENT
Start: 2025-04-10 | End: 2025-04-10

## 2025-04-10 RX ORDER — LIDOCAINE HYDROCHLORIDE 40 MG/ML
SOLUTION TOPICAL ONCE
OUTPATIENT
Start: 2025-04-10 | End: 2025-04-10

## 2025-04-10 RX ORDER — GENTAMICIN SULFATE 1 MG/G
OINTMENT TOPICAL ONCE
OUTPATIENT
Start: 2025-04-10 | End: 2025-04-10

## 2025-04-10 RX ORDER — SILVER SULFADIAZINE 10 MG/G
CREAM TOPICAL ONCE
OUTPATIENT
Start: 2025-04-10 | End: 2025-04-10

## 2025-04-10 ASSESSMENT — PAIN DESCRIPTION - LOCATION: LOCATION: ABDOMEN

## 2025-04-10 ASSESSMENT — PAIN DESCRIPTION - DESCRIPTORS: DESCRIPTORS: NAGGING

## 2025-04-10 ASSESSMENT — PAIN DESCRIPTION - FREQUENCY: FREQUENCY: CONTINUOUS

## 2025-04-10 ASSESSMENT — PAIN SCALES - GENERAL: PAINLEVEL_OUTOF10: 6

## 2025-04-10 NOTE — PATIENT INSTRUCTIONS
PHYSICIAN ORDERS AND DISCHARGE INSTRUCTIONS     Wound Care Notes:  Sees Dr Kumar.  surgical shoes with pegassist to offload right plantar wound 3/13/25                  Applied for Kerecis grafts on 24  Donated Kerecis applied to left lateral foot 24                             Orders for this week: 4/10/2025      Abdomen: Wash with soap and water, pat dry.    Apply Gentamicin, Puracol Ag  Cover with ca alginate and gentac w/ tegaderm  Change Daily       Leave in place til next visit to Wound Care Center    Plan: HBO workup started 9/10/24.   [facility] discharge due to not home bound 24.   Toenails 2025.        Follow Up Instructions: 1 weeks  Primary Wound Care Provider: Shelly Rubalcava CNP  Call  for any questions or concerns.  Central [phone] for imaging lab work

## 2025-04-11 PROBLEM — G89.4 CHRONIC PAIN SYNDROME: Status: ACTIVE | Noted: 2025-04-11

## 2025-04-11 PROBLEM — L08.9 TYPE 2 DIABETES MELLITUS WITH RIGHT DIABETIC FOOT INFECTION (HCC): Status: RESOLVED | Noted: 2019-03-21 | Resolved: 2025-04-11

## 2025-04-11 PROBLEM — E11.628 TYPE 2 DIABETES MELLITUS WITH RIGHT DIABETIC FOOT INFECTION (HCC): Status: RESOLVED | Noted: 2019-03-21 | Resolved: 2025-04-11
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
## 2025-04-17 ENCOUNTER — HOSPITAL ENCOUNTER (OUTPATIENT)
Dept: WOUND CARE | Age: 50
Discharge: HOME | End: 2025-04-17
Attending: STUDENT IN AN ORGANIZED HEALTH CARE EDUCATION/TRAINING PROGRAM
Payer: MEDICARE

## 2025-04-17 VITALS
HEART RATE: 87 BPM | SYSTOLIC BLOOD PRESSURE: 105 MMHG | DIASTOLIC BLOOD PRESSURE: 59 MMHG | TEMPERATURE: 98.1 F | RESPIRATION RATE: 20 BRPM

## 2025-04-17 DIAGNOSIS — N18.6 ESRD (END STAGE RENAL DISEASE) ON DIALYSIS (HCC): ICD-10-CM

## 2025-04-17 DIAGNOSIS — T21.22XD: Primary | ICD-10-CM

## 2025-04-17 DIAGNOSIS — E11.622 TYPE 2 DIABETES MELLITUS WITH OTHER SKIN ULCER, WITH LONG-TERM CURRENT USE OF INSULIN: ICD-10-CM

## 2025-04-17 DIAGNOSIS — Z79.4 TYPE 2 DIABETES MELLITUS WITH OTHER SKIN ULCER, WITH LONG-TERM CURRENT USE OF INSULIN: ICD-10-CM

## 2025-04-17 DIAGNOSIS — Z99.2 ESRD (END STAGE RENAL DISEASE) ON DIALYSIS (HCC): ICD-10-CM

## 2025-04-17 DIAGNOSIS — Z89.422: ICD-10-CM

## 2025-04-17 DIAGNOSIS — L98.492 ULCER OF ABDOMEN WALL WITH FAT LAYER EXPOSED (HCC): ICD-10-CM

## 2025-04-17 PROCEDURE — 11042 DBRDMT SUBQ TIS 1ST 20SQCM/<: CPT

## 2025-04-17 PROCEDURE — 6370000000 HC RX 637 (ALT 250 FOR IP): Performed by: SURGERY

## 2025-04-17 RX ORDER — LIDOCAINE 40 MG/G
CREAM TOPICAL ONCE
OUTPATIENT
Start: 2025-04-17 | End: 2025-04-17

## 2025-04-17 RX ORDER — SODIUM CHLOR/HYPOCHLOROUS ACID 0.033 %
SOLUTION, IRRIGATION IRRIGATION ONCE
OUTPATIENT
Start: 2025-04-17 | End: 2025-04-17

## 2025-04-17 RX ORDER — MUPIROCIN 20 MG/G
OINTMENT TOPICAL ONCE
OUTPATIENT
Start: 2025-04-17 | End: 2025-04-17

## 2025-04-17 RX ORDER — LIDOCAINE 50 MG/G
OINTMENT TOPICAL ONCE
OUTPATIENT
Start: 2025-04-17 | End: 2025-04-17

## 2025-04-17 RX ORDER — NEOMYCIN/BACITRACIN/POLYMYXINB 3.5-400-5K
OINTMENT (GRAM) TOPICAL ONCE
OUTPATIENT
Start: 2025-04-17 | End: 2025-04-17

## 2025-04-17 RX ORDER — BACITRACIN ZINC AND POLYMYXIN B SULFATE 500; 1000 [USP'U]/G; [USP'U]/G
OINTMENT TOPICAL ONCE
OUTPATIENT
Start: 2025-04-17 | End: 2025-04-17

## 2025-04-17 RX ORDER — GENTAMICIN SULFATE 1 MG/G
OINTMENT TOPICAL ONCE
Status: COMPLETED | OUTPATIENT
Start: 2025-04-17 | End: 2025-04-17

## 2025-04-17 RX ORDER — SILVER SULFADIAZINE 10 MG/G
CREAM TOPICAL ONCE
OUTPATIENT
Start: 2025-04-17 | End: 2025-04-17

## 2025-04-17 RX ORDER — LIDOCAINE HYDROCHLORIDE 40 MG/ML
SOLUTION TOPICAL ONCE
OUTPATIENT
Start: 2025-04-17 | End: 2025-04-17

## 2025-04-17 RX ORDER — GENTAMICIN SULFATE 1 MG/G
OINTMENT TOPICAL ONCE
OUTPATIENT
Start: 2025-04-17 | End: 2025-04-17

## 2025-04-17 RX ORDER — LIDOCAINE 50 MG/G
OINTMENT TOPICAL ONCE
Status: COMPLETED | OUTPATIENT
Start: 2025-04-17 | End: 2025-04-17

## 2025-04-17 RX ORDER — CLOBETASOL PROPIONATE 0.5 MG/G
OINTMENT TOPICAL ONCE
OUTPATIENT
Start: 2025-04-17 | End: 2025-04-17

## 2025-04-17 RX ORDER — TRIAMCINOLONE ACETONIDE 1 MG/G
OINTMENT TOPICAL ONCE
OUTPATIENT
Start: 2025-04-17 | End: 2025-04-17

## 2025-04-17 RX ORDER — LIDOCAINE HYDROCHLORIDE 20 MG/ML
JELLY TOPICAL ONCE
OUTPATIENT
Start: 2025-04-17 | End: 2025-04-17

## 2025-04-17 RX ORDER — BETAMETHASONE DIPROPIONATE 0.5 MG/G
CREAM TOPICAL ONCE
OUTPATIENT
Start: 2025-04-17 | End: 2025-04-17

## 2025-04-17 RX ORDER — GINSENG 100 MG
CAPSULE ORAL ONCE
OUTPATIENT
Start: 2025-04-17 | End: 2025-04-17

## 2025-04-17 RX ADMIN — GENTAMICIN SULFATE: 1 OINTMENT TOPICAL at 10:47

## 2025-04-17 RX ADMIN — LIDOCAINE: 50 OINTMENT TOPICAL at 10:48

## 2025-04-17 ASSESSMENT — PAIN DESCRIPTION - PAIN TYPE: TYPE: ACUTE PAIN

## 2025-04-17 ASSESSMENT — PAIN SCALES - GENERAL
PAINLEVEL_OUTOF10: 6
PAINLEVEL_OUTOF10: 6

## 2025-04-17 ASSESSMENT — PAIN DESCRIPTION - LOCATION
LOCATION: ABDOMEN
LOCATION: ABDOMEN

## 2025-04-17 ASSESSMENT — PAIN DESCRIPTION - DESCRIPTORS: DESCRIPTORS: BURNING

## 2025-04-17 ASSESSMENT — PAIN DESCRIPTION - ORIENTATION: ORIENTATION: RIGHT

## 2025-04-17 ASSESSMENT — PAIN - FUNCTIONAL ASSESSMENT: PAIN_FUNCTIONAL_ASSESSMENT: ACTIVITIES ARE NOT PREVENTED

## 2025-04-17 NOTE — PROGRESS NOTES
Volume (cm^3) 0.931 cm^3 25 1041   Distance Tunneling (cm) 0 cm 25 1033   Tunneling Position ___ O'Clock 0 25 1033   Undermining Starts ___ O'Clock 0 25 1033   Undermining Ends___ O'Clock 0 25 1033   Undermining Maxium Distance (cm) 0 25 1033   Wound Assessment Pink/red 25 1033   Drainage Amount None (dry) 25 1033   Odor None 25 1033   Shazia-wound Assessment Blanchable erythema 25 1033   Margins Attached edges 25 1033   Wound Thickness Description not for Pressure Injury Full thickness 25 1033   Number of days: 99     Total  Area  Debrided: less than 5% total body surface    Bleeding:  Minimal    Hemostasis Achieved:  by pressure    Procedural Pain:  0  / 10     Post Procedural Pain:  0 / 10     Response to treatment:  Well tolerated by patient.       Plan:     Patient Instructions   PHYSICIAN ORDERS AND DISCHARGE INSTRUCTIONS     Wound Care Notes:  Sees Dr Kumar.  surgical shoes with pegassist to offload right plantar wound 3/13/25                  Applied for Kerecis grafts on 24  Donated Kerecis applied to left lateral foot 24                             Orders for this week: 2025    Abdomen: Wash with soap and water, pat dry.  Apply Gentamicin, lidocaine, Puracol Ag to wound bed   Cover with ca alginate and gentac w/ tegaderm  Change Daily     Plan: HBO workup started 9/10/24.   [facility] discharge due to not home bound 24.   Toenails 2025.        Follow Up Instructions: 2 weeks   Primary Wound Care Provider: Shelly Rubalcava CNP  Call  for any questions or concerns.  Central [phone] for imaging lab work

## 2025-04-17 NOTE — PATIENT INSTRUCTIONS
PHYSICIAN ORDERS AND DISCHARGE INSTRUCTIONS     Wound Care Notes:  Sees Dr Kumar.  surgical shoes with pegassist to offload right plantar wound 3/13/25                  Applied for Kerecis grafts on 24  Donated Kerecis applied to left lateral foot 24                             Orders for this week: 2025    Abdomen: Wash with soap and water, pat dry.  Apply Gentamicin, lidocaine, Puracol Ag to wound bed   Cover with ca alginate and gentac w/ tegaderm  Change Daily     Plan: HBO workup started 9/10/24.   [facility] discharge due to not home bound 24.   Toenails 2025.        Follow Up Instructions: 2 weeks   Primary Wound Care Provider: Shelly Rubalcava CNP  Call  for any questions or concerns.  Central [phone] for imaging lab work

## 2025-04-25 ENCOUNTER — HOSPITAL ENCOUNTER (OUTPATIENT)
Age: 50
Setting detail: SPECIMEN
Discharge: HOME | End: 2025-04-25

## 2025-04-25 LAB — HGB BLD-MCNC: 9.9 G/DL (ref 13.5–18)

## 2025-04-25 PROCEDURE — 85018 HEMOGLOBIN: CPT

## 2025-05-01 ENCOUNTER — HOSPITAL ENCOUNTER (OUTPATIENT)
Dept: WOUND CARE | Age: 50
Discharge: HOME | End: 2025-05-01
Attending: STUDENT IN AN ORGANIZED HEALTH CARE EDUCATION/TRAINING PROGRAM
Payer: MEDICARE

## 2025-05-01 VITALS — RESPIRATION RATE: 18 BRPM | TEMPERATURE: 97.2 F

## 2025-05-01 DIAGNOSIS — Z89.422: Primary | ICD-10-CM

## 2025-05-01 PROCEDURE — 6370000000 HC RX 637 (ALT 250 FOR IP): Performed by: SURGERY

## 2025-05-01 PROCEDURE — 11042 DBRDMT SUBQ TIS 1ST 20SQCM/<: CPT

## 2025-05-01 RX ORDER — SODIUM CHLOR/HYPOCHLOROUS ACID 0.033 %
SOLUTION, IRRIGATION IRRIGATION ONCE
OUTPATIENT
Start: 2025-05-01 | End: 2025-05-01

## 2025-05-01 RX ORDER — LIDOCAINE 40 MG/G
CREAM TOPICAL ONCE
OUTPATIENT
Start: 2025-05-01 | End: 2025-05-01

## 2025-05-01 RX ORDER — GENTAMICIN SULFATE 1 MG/G
OINTMENT TOPICAL ONCE
Status: COMPLETED | OUTPATIENT
Start: 2025-05-01 | End: 2025-05-01

## 2025-05-01 RX ORDER — BETAMETHASONE DIPROPIONATE 0.5 MG/G
CREAM TOPICAL ONCE
OUTPATIENT
Start: 2025-05-01 | End: 2025-05-01

## 2025-05-01 RX ORDER — TRIAMCINOLONE ACETONIDE 1 MG/G
OINTMENT TOPICAL ONCE
OUTPATIENT
Start: 2025-05-01 | End: 2025-05-01

## 2025-05-01 RX ORDER — GENTAMICIN SULFATE 1 MG/G
OINTMENT TOPICAL
Qty: 30 G | Refills: 1 | Status: SHIPPED | OUTPATIENT
Start: 2025-05-01 | End: 2025-05-02 | Stop reason: SDUPTHER

## 2025-05-01 RX ORDER — SILVER SULFADIAZINE 10 MG/G
CREAM TOPICAL ONCE
OUTPATIENT
Start: 2025-05-01 | End: 2025-05-01

## 2025-05-01 RX ORDER — CLOBETASOL PROPIONATE 0.5 MG/G
OINTMENT TOPICAL ONCE
OUTPATIENT
Start: 2025-05-01 | End: 2025-05-01

## 2025-05-01 RX ORDER — LIDOCAINE 50 MG/G
OINTMENT TOPICAL ONCE
Status: COMPLETED | OUTPATIENT
Start: 2025-05-01 | End: 2025-05-01

## 2025-05-01 RX ORDER — BACITRACIN ZINC 500 [USP'U]/G
OINTMENT TOPICAL ONCE
OUTPATIENT
Start: 2025-05-01 | End: 2025-05-01

## 2025-05-01 RX ORDER — LIDOCAINE HYDROCHLORIDE 40 MG/ML
SOLUTION TOPICAL ONCE
OUTPATIENT
Start: 2025-05-01 | End: 2025-05-01

## 2025-05-01 RX ORDER — LIDOCAINE 50 MG/G
OINTMENT TOPICAL ONCE
OUTPATIENT
Start: 2025-05-01 | End: 2025-05-01

## 2025-05-01 RX ORDER — LIDOCAINE HYDROCHLORIDE 20 MG/ML
JELLY TOPICAL ONCE
OUTPATIENT
Start: 2025-05-01 | End: 2025-05-01

## 2025-05-01 RX ORDER — GENTAMICIN SULFATE 1 MG/G
OINTMENT TOPICAL ONCE
OUTPATIENT
Start: 2025-05-01 | End: 2025-05-01

## 2025-05-01 RX ORDER — NEOMYCIN/BACITRACIN/POLYMYXINB 3.5-400-5K
OINTMENT (GRAM) TOPICAL ONCE
OUTPATIENT
Start: 2025-05-01 | End: 2025-05-01

## 2025-05-01 RX ORDER — MUPIROCIN 20 MG/G
OINTMENT TOPICAL ONCE
OUTPATIENT
Start: 2025-05-01 | End: 2025-05-01

## 2025-05-01 RX ORDER — BACITRACIN ZINC AND POLYMYXIN B SULFATE 500; 1000 [USP'U]/G; [USP'U]/G
OINTMENT TOPICAL ONCE
OUTPATIENT
Start: 2025-05-01 | End: 2025-05-01

## 2025-05-01 RX ADMIN — LIDOCAINE: 50 OINTMENT TOPICAL at 11:02

## 2025-05-01 RX ADMIN — GENTAMICIN SULFATE: 1 OINTMENT TOPICAL at 11:03

## 2025-05-01 ASSESSMENT — PAIN SCALES - GENERAL: PAINLEVEL_OUTOF10: 0

## 2025-05-01 NOTE — PROGRESS NOTES
(HYGROTON) 25 MG tablet Take 1 tablet by mouth daily 30 tablet 3    [DISCONTINUED] linagliptin (TRADJENTA) 5 MG tablet Take 1 tablet by mouth daily 30 tablet 5    Lancets MISC 1 each by Does not apply route daily 100 each 3    glucose monitoring kit (FREESTYLE) monitoring kit 1 each by Does not apply route once for 1 dose. 1 kit 0     No current facility-administered medications on file prior to encounter.       PROBLEM LIST    Patient Active Problem List   Diagnosis    Hiatal hernia    Diabetes mellitus type 2, improved controlled    Diabetic neuropathy (HCC)    Panic attack    Anxiety associated with depression    Neck pain    DANIELA (obstructive sleep apnea)    Urinary frequency    Bilateral carpal tunnel syndrome- left worse than right    Hyperlipidemia with target LDL less than 100    Essential hypertension    Bronchitis    Osteomyelitis (HCC)    Abscess    Periumbilical hernia    Sepsis (HCC)    Cellulitis of left foot    Constipation    Intractable nausea and vomiting    Nausea and vomiting    Acute renal failure (ARF)    Cellulitis    Cellulitis of left arm    Cellulitis, scrotum    Acute epididymitis    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[facility])    Recurrent major depressive disorder, in full remission    Generalized anxiety disorder    Necrotizing fasciitis (HCC)    Syncope    Right groin wound    Nephrotic syndrome    Generalized edema    Stage 3b chronic kidney disease (HCC)    Diabetic nephropathy associated with type 2 diabetes mellitus (HCC)    Hypoalbuminemia    Chronic kidney disease, stage IV (severe) ([facility])    FH: CAD (coronary artery disease)    ASCVD (arteriosclerotic cardiovascular disease)    Other proteinuria    Chest pain    Surgical wound dehiscence    CARMEN (acute kidney injury)    Anemia    Chest tightness    NSTEMI (non-ST elevated myocardial infarction) (HCC)    Diabetic foot ulcer with osteomyelitis (HCC)    ESRD (end stage renal disease) (HCC)    Problem with vascular access    Acute on chronic respiratory

## 2025-05-01 NOTE — PATIENT INSTRUCTIONS
PHYSICIAN ORDERS AND DISCHARGE INSTRUCTIONS     Wound Care Notes:  Sees Dr Kumar.  surgical shoes with pegassist to offload right plantar wound 3/13/25                  Applied for Kerecis grafts on 24  Donated Kerecis applied to left lateral foot 24                             Orders for this week: 2025    Abdomen: Wash with soap and water, pat dry.  Apply Gentamicin, lidocaine, Puracol Ag to wound bed   Cover with ca alginate and gentac w/ tegaderm  Change Daily     Plan: HBO workup started 9/10/24.   [facility] discharge due to not home bound 24.   Toenails 2025.        Follow Up Instructions: 1 weeks   Primary Wound Care Provider: Shelly Rubalcava CNP  Call  for any questions or concerns.  Central [phone] for imaging lab work

## 2025-05-02 RX ORDER — GENTAMICIN SULFATE 1 MG/G
OINTMENT TOPICAL DAILY
Qty: 30 G | Refills: 1 | Status: SHIPPED | OUTPATIENT
Start: 2025-05-02

## 2025-05-08 ENCOUNTER — HOSPITAL ENCOUNTER (OUTPATIENT)
Dept: WOUND CARE | Age: 50
Discharge: HOME | End: 2025-05-08
Attending: STUDENT IN AN ORGANIZED HEALTH CARE EDUCATION/TRAINING PROGRAM
Payer: MEDICARE

## 2025-05-08 VITALS
TEMPERATURE: 97.8 F | DIASTOLIC BLOOD PRESSURE: 86 MMHG | SYSTOLIC BLOOD PRESSURE: 161 MMHG | HEART RATE: 89 BPM | RESPIRATION RATE: 18 BRPM

## 2025-05-08 DIAGNOSIS — E11.620 TYPE 2 DIABETES MELLITUS WITH DIABETIC DERMATITIS, WITH LONG-TERM CURRENT USE OF INSULIN (HCC): ICD-10-CM

## 2025-05-08 DIAGNOSIS — L84 PRE-ULCERATIVE CALLUSES: ICD-10-CM

## 2025-05-08 DIAGNOSIS — Z89.422: ICD-10-CM

## 2025-05-08 DIAGNOSIS — G89.4 CHRONIC PAIN SYNDROME: ICD-10-CM

## 2025-05-08 DIAGNOSIS — N18.6 ESRD (END STAGE RENAL DISEASE) ON DIALYSIS (HCC): ICD-10-CM

## 2025-05-08 DIAGNOSIS — Z79.4 TYPE 2 DIABETES MELLITUS WITH DIABETIC DERMATITIS, WITH LONG-TERM CURRENT USE OF INSULIN (HCC): ICD-10-CM

## 2025-05-08 DIAGNOSIS — Z99.2 ESRD (END STAGE RENAL DISEASE) ON DIALYSIS (HCC): ICD-10-CM

## 2025-05-08 DIAGNOSIS — L98.492 ULCER OF ABDOMEN WALL WITH FAT LAYER EXPOSED (HCC): Primary | ICD-10-CM

## 2025-05-08 PROCEDURE — 11042 DBRDMT SUBQ TIS 1ST 20SQCM/<: CPT

## 2025-05-08 PROCEDURE — 6370000000 HC RX 637 (ALT 250 FOR IP): Performed by: SURGERY

## 2025-05-08 PROCEDURE — 16020 DRESS/DEBRID P-THICK BURN S: CPT

## 2025-05-08 RX ORDER — LIDOCAINE HYDROCHLORIDE 40 MG/ML
SOLUTION TOPICAL ONCE
OUTPATIENT
Start: 2025-05-08 | End: 2025-05-08

## 2025-05-08 RX ORDER — BETAMETHASONE DIPROPIONATE 0.5 MG/G
CREAM TOPICAL ONCE
Status: CANCELLED | OUTPATIENT
Start: 2025-05-08 | End: 2025-05-08

## 2025-05-08 RX ORDER — BACITRACIN ZINC AND POLYMYXIN B SULFATE 500; 1000 [USP'U]/G; [USP'U]/G
OINTMENT TOPICAL ONCE
OUTPATIENT
Start: 2025-05-08 | End: 2025-05-08

## 2025-05-08 RX ORDER — MUPIROCIN 20 MG/G
OINTMENT TOPICAL ONCE
Status: CANCELLED | OUTPATIENT
Start: 2025-05-08 | End: 2025-05-08

## 2025-05-08 RX ORDER — GINSENG 100 MG
CAPSULE ORAL ONCE
OUTPATIENT
Start: 2025-05-08 | End: 2025-05-08

## 2025-05-08 RX ORDER — LIDOCAINE HYDROCHLORIDE 40 MG/ML
SOLUTION TOPICAL ONCE
Status: CANCELLED | OUTPATIENT
Start: 2025-05-08 | End: 2025-05-08

## 2025-05-08 RX ORDER — SODIUM CHLOR/HYPOCHLOROUS ACID 0.033 %
SOLUTION, IRRIGATION IRRIGATION ONCE
Status: CANCELLED | OUTPATIENT
Start: 2025-05-08 | End: 2025-05-08

## 2025-05-08 RX ORDER — CLOBETASOL PROPIONATE 0.5 MG/G
OINTMENT TOPICAL ONCE
Status: CANCELLED | OUTPATIENT
Start: 2025-05-08 | End: 2025-05-08

## 2025-05-08 RX ORDER — LIDOCAINE 40 MG/G
CREAM TOPICAL ONCE
OUTPATIENT
Start: 2025-05-08 | End: 2025-05-08

## 2025-05-08 RX ORDER — NEOMYCIN/BACITRACIN/POLYMYXINB 3.5-400-5K
OINTMENT (GRAM) TOPICAL ONCE
OUTPATIENT
Start: 2025-05-08 | End: 2025-05-08

## 2025-05-08 RX ORDER — MUPIROCIN 20 MG/G
OINTMENT TOPICAL ONCE
OUTPATIENT
Start: 2025-05-08 | End: 2025-05-08

## 2025-05-08 RX ORDER — TRIAMCINOLONE ACETONIDE 1 MG/G
OINTMENT TOPICAL ONCE
OUTPATIENT
Start: 2025-05-08 | End: 2025-05-08

## 2025-05-08 RX ORDER — BACITRACIN ZINC AND POLYMYXIN B SULFATE 500; 1000 [USP'U]/G; [USP'U]/G
OINTMENT TOPICAL ONCE
Status: CANCELLED | OUTPATIENT
Start: 2025-05-08 | End: 2025-05-08

## 2025-05-08 RX ORDER — GINSENG 100 MG
CAPSULE ORAL ONCE
Status: CANCELLED | OUTPATIENT
Start: 2025-05-08 | End: 2025-05-08

## 2025-05-08 RX ORDER — BETAMETHASONE DIPROPIONATE 0.5 MG/G
CREAM TOPICAL ONCE
OUTPATIENT
Start: 2025-05-08 | End: 2025-05-08

## 2025-05-08 RX ORDER — SILVER SULFADIAZINE 10 MG/G
CREAM TOPICAL ONCE
Status: CANCELLED | OUTPATIENT
Start: 2025-05-08 | End: 2025-05-08

## 2025-05-08 RX ORDER — LIDOCAINE 50 MG/G
OINTMENT TOPICAL ONCE
Status: COMPLETED | OUTPATIENT
Start: 2025-05-08 | End: 2025-05-08

## 2025-05-08 RX ORDER — SODIUM CHLOR/HYPOCHLOROUS ACID 0.033 %
SOLUTION, IRRIGATION IRRIGATION ONCE
OUTPATIENT
Start: 2025-05-08 | End: 2025-05-08

## 2025-05-08 RX ORDER — GENTAMICIN SULFATE 1 MG/G
OINTMENT TOPICAL ONCE
Status: COMPLETED | OUTPATIENT
Start: 2025-05-08 | End: 2025-05-08

## 2025-05-08 RX ORDER — OXYCODONE AND ACETAMINOPHEN 7.5; 325 MG/1; MG/1
1 TABLET ORAL EVERY 8 HOURS PRN
Qty: 90 TABLET | Refills: 0 | Status: SHIPPED | OUTPATIENT
Start: 2025-05-08 | End: 2025-06-07

## 2025-05-08 RX ORDER — CLOBETASOL PROPIONATE 0.5 MG/G
OINTMENT TOPICAL ONCE
OUTPATIENT
Start: 2025-05-08 | End: 2025-05-08

## 2025-05-08 RX ORDER — LIDOCAINE HYDROCHLORIDE 20 MG/ML
JELLY TOPICAL ONCE
Status: CANCELLED | OUTPATIENT
Start: 2025-05-08 | End: 2025-05-08

## 2025-05-08 RX ORDER — TRIAMCINOLONE ACETONIDE 1 MG/G
OINTMENT TOPICAL ONCE
Status: CANCELLED | OUTPATIENT
Start: 2025-05-08 | End: 2025-05-08

## 2025-05-08 RX ORDER — LIDOCAINE 50 MG/G
OINTMENT TOPICAL ONCE
OUTPATIENT
Start: 2025-05-08 | End: 2025-05-08

## 2025-05-08 RX ORDER — LIDOCAINE HYDROCHLORIDE 20 MG/ML
JELLY TOPICAL ONCE
OUTPATIENT
Start: 2025-05-08 | End: 2025-05-08

## 2025-05-08 RX ORDER — SILVER SULFADIAZINE 10 MG/G
CREAM TOPICAL ONCE
OUTPATIENT
Start: 2025-05-08 | End: 2025-05-08

## 2025-05-08 RX ORDER — NEOMYCIN/BACITRACIN/POLYMYXINB 3.5-400-5K
OINTMENT (GRAM) TOPICAL ONCE
Status: CANCELLED | OUTPATIENT
Start: 2025-05-08 | End: 2025-05-08

## 2025-05-08 RX ORDER — GENTAMICIN SULFATE 1 MG/G
OINTMENT TOPICAL ONCE
OUTPATIENT
Start: 2025-05-08 | End: 2025-05-08

## 2025-05-08 RX ORDER — LIDOCAINE 40 MG/G
CREAM TOPICAL ONCE
Status: CANCELLED | OUTPATIENT
Start: 2025-05-08 | End: 2025-05-08

## 2025-05-08 RX ADMIN — GENTAMICIN SULFATE: 1 OINTMENT TOPICAL at 11:00

## 2025-05-08 RX ADMIN — LIDOCAINE: 50 OINTMENT TOPICAL at 11:00

## 2025-05-08 NOTE — PATIENT INSTRUCTIONS
PHYSICIAN ORDERS AND DISCHARGE INSTRUCTIONS     Wound Care Notes:  Sees Dr Kumar.  surgical shoes with pegassist to offload right plantar wound 3/13/25                  Applied for Kerecis grafts on 24  Donated Kerecis applied to left lateral foot 24                             Orders for this week: 2025    Abdomen: Wash with soap and water, pat dry.  Apply Gentamicin, lidocaine, Puracol Ag to wound bed   Cover with ca alginate and gentac w/ tegaderm  Change Daily     Plan: HBO workup started 9/10/24.   [facility] discharge due to not home bound 24.   Toenails 2025.        Follow Up Instructions: 1 weeks   Primary Wound Care Provider: Shelly Rubalcava CNP  Call  for any questions or concerns.  Central [phone] for imaging lab work

## 2025-05-08 NOTE — PROGRESS NOTES
APPLIED GENTAMICIN, LIDOCAINE STIMULEN TO HEAD PER PROVIDER ORDER PT TOLERATED WITHOUT PROBLEMS.        
  Post-Procedure Volume (cm^3) 0.17 cm^3 25 1033   Distance Tunneling (cm) 0 cm 25 1019   Tunneling Position ___ O'Clock 0 25 1019   Undermining Starts ___ O'Clock 0 25 1019   Undermining Ends___ O'Clock 0 25 1019   Undermining Maxium Distance (cm) 0 25 1019   Wound Assessment Pink/red 25 1019   Drainage Amount None (dry) 25 1019   Odor None 25 1040   Shazia-wound Assessment Intact 25 1019   Margins Attached edges 25 1019   Wound Thickness Description not for Pressure Injury Partial thickness 25 1040   Number of days: 120     Total  Area  Debrided: less than 5% total body surface    Bleeding:  Minimal    Hemostasis Achieved:  by pressure    Procedural Pain:  0  / 10     Post Procedural Pain:  0 / 10     Response to treatment:  Well tolerated by patient.       Plan:     Patient Instructions   PHYSICIAN ORDERS AND DISCHARGE INSTRUCTIONS     Wound Care Notes:  Sees Dr Kumar.  surgical shoes with pegassist to offload right plantar wound 3/13/25                  Applied for Kerecis grafts on 24  Donated Kerecis applied to left lateral foot 24                             Orders for this week: 2025    Abdomen: Wash with soap and water, pat dry.  Apply Gentamicin, lidocaine, Puracol Ag to wound bed   Cover with ca alginate and gentac w/ tegaderm  Change Daily     Head Wound-- Clean with soap and water, pat dry  Apply Gentamicin, Lidocaine and Stimulen.     Plan: HBO workup started 9/10/24.   [facility] discharge due to not home bound 24.   Toenails 2025.        Follow Up Instructions: 1 weeks   Primary Wound Care Provider: Shelly Rubalcava CNP  Call  for any questions or concerns.  Central [phone] for imaging lab work

## 2025-05-15 ENCOUNTER — HOSPITAL ENCOUNTER (OUTPATIENT)
Dept: WOUND CARE | Age: 50
Discharge: HOME | End: 2025-05-15
Attending: STUDENT IN AN ORGANIZED HEALTH CARE EDUCATION/TRAINING PROGRAM
Payer: MEDICARE

## 2025-05-15 VITALS
HEART RATE: 93 BPM | RESPIRATION RATE: 20 BRPM | SYSTOLIC BLOOD PRESSURE: 175 MMHG | DIASTOLIC BLOOD PRESSURE: 92 MMHG | TEMPERATURE: 97 F

## 2025-05-15 DIAGNOSIS — Z89.422: Primary | ICD-10-CM

## 2025-05-15 DIAGNOSIS — T21.22XD BURN OF SECOND DEGREE OF ABDOMINAL WALL, SUBSEQUENT ENCOUNTER: ICD-10-CM

## 2025-05-15 DIAGNOSIS — L98.492 ULCER OF ABDOMEN WALL WITH FAT LAYER EXPOSED (HCC): ICD-10-CM

## 2025-05-15 PROCEDURE — 16020 DRESS/DEBRID P-THICK BURN S: CPT | Performed by: NURSE PRACTITIONER

## 2025-05-15 PROCEDURE — 11042 DBRDMT SUBQ TIS 1ST 20SQCM/<: CPT

## 2025-05-15 RX ORDER — NEOMYCIN/BACITRACIN/POLYMYXINB 3.5-400-5K
OINTMENT (GRAM) TOPICAL ONCE
OUTPATIENT
Start: 2025-05-15 | End: 2025-05-15

## 2025-05-15 RX ORDER — GINSENG 100 MG
CAPSULE ORAL ONCE
OUTPATIENT
Start: 2025-05-15 | End: 2025-05-15

## 2025-05-15 RX ORDER — LIDOCAINE HYDROCHLORIDE 20 MG/ML
JELLY TOPICAL ONCE
OUTPATIENT
Start: 2025-05-15 | End: 2025-05-15

## 2025-05-15 RX ORDER — GENTAMICIN SULFATE 1 MG/G
OINTMENT TOPICAL ONCE
OUTPATIENT
Start: 2025-05-15 | End: 2025-05-15

## 2025-05-15 RX ORDER — LIDOCAINE 50 MG/G
OINTMENT TOPICAL ONCE
OUTPATIENT
Start: 2025-05-15 | End: 2025-05-15

## 2025-05-15 RX ORDER — BACITRACIN ZINC AND POLYMYXIN B SULFATE 500; 1000 [USP'U]/G; [USP'U]/G
OINTMENT TOPICAL ONCE
OUTPATIENT
Start: 2025-05-15 | End: 2025-05-15

## 2025-05-15 RX ORDER — LIDOCAINE 40 MG/G
CREAM TOPICAL ONCE
OUTPATIENT
Start: 2025-05-15 | End: 2025-05-15

## 2025-05-15 RX ORDER — BETAMETHASONE DIPROPIONATE 0.5 MG/G
CREAM TOPICAL ONCE
OUTPATIENT
Start: 2025-05-15 | End: 2025-05-15

## 2025-05-15 RX ORDER — MUPIROCIN 20 MG/G
OINTMENT TOPICAL ONCE
OUTPATIENT
Start: 2025-05-15 | End: 2025-05-15

## 2025-05-15 RX ORDER — TRIAMCINOLONE ACETONIDE 1 MG/G
OINTMENT TOPICAL ONCE
OUTPATIENT
Start: 2025-05-15 | End: 2025-05-15

## 2025-05-15 RX ORDER — SODIUM CHLOR/HYPOCHLOROUS ACID 0.033 %
SOLUTION, IRRIGATION IRRIGATION ONCE
OUTPATIENT
Start: 2025-05-15 | End: 2025-05-15

## 2025-05-15 RX ORDER — SILVER SULFADIAZINE 10 MG/G
CREAM TOPICAL ONCE
OUTPATIENT
Start: 2025-05-15 | End: 2025-05-15

## 2025-05-15 RX ORDER — CLOBETASOL PROPIONATE 0.5 MG/G
OINTMENT TOPICAL ONCE
OUTPATIENT
Start: 2025-05-15 | End: 2025-05-15

## 2025-05-15 RX ORDER — LIDOCAINE HYDROCHLORIDE 40 MG/ML
SOLUTION TOPICAL ONCE
OUTPATIENT
Start: 2025-05-15 | End: 2025-05-15

## 2025-05-15 ASSESSMENT — PAIN DESCRIPTION - PAIN TYPE: TYPE: ACUTE PAIN

## 2025-05-15 ASSESSMENT — PAIN DESCRIPTION - DESCRIPTORS: DESCRIPTORS: OTHER (COMMENT)

## 2025-05-15 ASSESSMENT — PAIN DESCRIPTION - ONSET: ONSET: ON-GOING

## 2025-05-15 ASSESSMENT — PAIN SCALES - GENERAL: PAINLEVEL_OUTOF10: 5

## 2025-05-15 ASSESSMENT — PAIN DESCRIPTION - FREQUENCY: FREQUENCY: CONTINUOUS

## 2025-05-15 ASSESSMENT — PAIN DESCRIPTION - LOCATION: LOCATION: ABDOMEN

## 2025-05-15 ASSESSMENT — PAIN DESCRIPTION - ORIENTATION: ORIENTATION: ANTERIOR

## 2025-05-15 ASSESSMENT — PAIN - FUNCTIONAL ASSESSMENT: PAIN_FUNCTIONAL_ASSESSMENT: ACTIVITIES ARE NOT PREVENTED

## 2025-05-15 NOTE — PATIENT INSTRUCTIONS
PHYSICIAN ORDERS AND DISCHARGE INSTRUCTIONS     Wound Care Notes:  Sees Dr Kumar.  surgical shoes with pegassist to offload right plantar wound 3/13/25                  Applied for Kerecis grafts on 24  Donated Kerecis applied to left lateral foot 24                             Orders for this week: 5/15/2025    Abdomen: Wash with soap and water, pat dry.  Apply Betadine to wound bed   Cover with ca alginate and gentac w/ tegaderm  Change Daily     Head- Clean with soap and water, pat dry  Apply Gentamicin, Lidocaine and Stimulen.     Plan: HBO workup started 9/10/24.   [facility] discharge due to not home bound 24.   Toenails 2025.        Follow Up Instructions: 1 weeks   Primary Wound Care Provider: Shelly Rubalcava CNP  Call  for any questions or concerns.  Central [phone] for imaging lab work
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
## 2025-05-22 ENCOUNTER — HOSPITAL ENCOUNTER (OUTPATIENT)
Dept: WOUND CARE | Age: 50
Discharge: HOME | End: 2025-05-22
Attending: STUDENT IN AN ORGANIZED HEALTH CARE EDUCATION/TRAINING PROGRAM
Payer: MEDICARE

## 2025-05-22 VITALS
DIASTOLIC BLOOD PRESSURE: 96 MMHG | HEART RATE: 91 BPM | TEMPERATURE: 98.4 F | RESPIRATION RATE: 20 BRPM | SYSTOLIC BLOOD PRESSURE: 158 MMHG

## 2025-05-22 DIAGNOSIS — Z79.4 TYPE 2 DIABETES MELLITUS WITH OTHER SKIN ULCER, WITH LONG-TERM CURRENT USE OF INSULIN (HCC): ICD-10-CM

## 2025-05-22 DIAGNOSIS — L84 PRE-ULCERATIVE CALLUSES: ICD-10-CM

## 2025-05-22 DIAGNOSIS — E11.622 TYPE 2 DIABETES MELLITUS WITH OTHER SKIN ULCER, WITH LONG-TERM CURRENT USE OF INSULIN (HCC): ICD-10-CM

## 2025-05-22 DIAGNOSIS — G89.4 CHRONIC PAIN SYNDROME: ICD-10-CM

## 2025-05-22 DIAGNOSIS — E08.42 DIABETIC POLYNEUROPATHY ASSOCIATED WITH DIABETES MELLITUS DUE TO UNDERLYING CONDITION (HCC): Chronic | ICD-10-CM

## 2025-05-22 DIAGNOSIS — T21.22XD BURN OF SECOND DEGREE OF ABDOMINAL WALL, SUBSEQUENT ENCOUNTER: Primary | ICD-10-CM

## 2025-05-22 PROBLEM — T23.222A PARTIAL THICKNESS BURN OF FINGER OF LEFT HAND: Status: RESOLVED | Noted: 2025-02-27 | Resolved: 2025-05-22

## 2025-05-22 PROBLEM — T81.31XA SURGICAL WOUND DEHISCENCE: Status: RESOLVED | Noted: 2022-02-08 | Resolved: 2025-05-22

## 2025-05-22 PROCEDURE — 11055 PARING/CUTG B9 HYPRKER LES 1: CPT

## 2025-05-22 PROCEDURE — 11056 PARNG/CUTG B9 HYPRKR LES 2-4: CPT | Performed by: NURSE PRACTITIONER

## 2025-05-22 PROCEDURE — 16020 DRESS/DEBRID P-THICK BURN S: CPT | Performed by: NURSE PRACTITIONER

## 2025-05-22 PROCEDURE — 11042 DBRDMT SUBQ TIS 1ST 20SQCM/<: CPT

## 2025-05-22 NOTE — PROGRESS NOTES
syndrome     Procedure Note    Indications:  Based on my examination of this patient's wound(s) today, sharp excision into necrotic subcutaneous tissue is required to promote healing and evaluate the extent of previous healing.    Performed by: APPLE Aguilera CNP    Consent obtained: Yes    Time out taken:  Yes    Pain Control:  2% Lidocaine spray    Debridement:Excisional Debridement    Using curette the wound(s) was/were sharply debrided down through and including the removal of subcutaneous tissue.        Devitalized Tissue Debrided:  fibrin, biofilm, slough, and exudate    Pre Debridement Measurements:  Are located in the Wound Documentation Flow Sheet    All active wounds listed below with today's date are evaluated  Wound(s) debrided this date include # : 1    Post  Debridement Measurements:  Wound 01/07/25 Abdomen Left #1 Left abdomen cluster (Active)   Wound Image   05/22/25 1037   Wound Etiology Burn 05/08/25 1033   Dressing Status New dressing applied 05/22/25 1130   Wound Cleansed Soap and water;Wound cleanser 05/22/25 1033   Dressing/Treatment Other (comment) 05/15/25 1116   Offloading for Diabetic Foot Ulcers Offloading not required 05/22/25 1130   Wound Length (cm) 3.5 cm 05/22/25 1033   Wound Width (cm) 3 cm 05/22/25 1033   Wound Depth (cm) 0.1 cm 05/22/25 1033   Wound Surface Area (cm^2) 10.5 cm^2 05/22/25 1033   Change in Wound Size % (l*w) -250 05/22/25 1033   Wound Volume (cm^3) 1.05 cm^3 05/22/25 1033   Wound Healing % -250 05/22/25 1033   Post-Procedure Length (cm) 3.5 cm 05/22/25 1055   Post-Procedure Width (cm) 3 cm 05/22/25 1055   Post-Procedure Depth (cm) 0.1 cm 05/22/25 1055   Post-Procedure Surface Area (cm^2) 10.5 cm^2 05/22/25 1055   Post-Procedure Volume (cm^3) 1.05 cm^3 05/22/25 1055   Distance Tunneling (cm) 0 cm 05/22/25 1033   Tunneling Position ___ O'Clock 0 05/22/25 1033   Undermining Starts ___ O'Clock 0 05/22/25 1033   Undermining Ends___ O'Clock 0 05/22/25 1033

## 2025-05-22 NOTE — PATIENT INSTRUCTIONS
PHYSICIAN ORDERS AND DISCHARGE INSTRUCTIONS     Wound Care Notes:  Sees Dr Kumar.  surgical shoes with pegassist to offload right plantar wound 3/13/25                  Applied for Kerecis grafts on 24  Donated Kerecis applied to left lateral foot 24                             Orders for this week: 2025    Abdomen: Wash with soap and water, pat dry.  Apply Betadine to wound bed   Cover with ca alginate and gentac w/ tegaderm  Change Daily       Plan: HBO workup started 9/10/24.   [facility] discharge due to not home bound 24.   Toenails 2025.        Follow Up Instructions: 1 weeks   Primary Wound Care Provider: Shelly Rubalcava CNP  Call  for any questions or concerns.  Central [phone] for imaging lab work

## 2025-05-22 NOTE — CARE COORDINATION
02/18/25 1100   Service Assessment   Patient Orientation Alert and Oriented   Cognition Alert   History Provided By Patient;Medical Record   Primary Caregiver Self   Support Systems Children;Family Members   Patient's Healthcare Decision Maker is: Legal Next of Kin   PCP Verified by CM Yes   Prior Functional Level Assistance with the following:;Other (see comment);Mobility  (Transportation)   Current Functional Level Mobility;Toileting  (Hospital policy)   Can patient return to prior living arrangement Yes   Ability to make needs known: Good   Family able to assist with home care needs: Yes   Would you like for me to discuss the discharge plan with any other family members/significant others, and if so, who? Yes  (Son if necessary)   Financial Resources Medicaid   Community Resources Transportation;Other (Comment)  (Wound Care clinic)   Social/Functional History   Lives With Alone   Type of Home House   Home Layout One level   Active  No   Patient's  Info Uses [employer] for HD/WCC.   Mode of Transportation Van   Occupation On disability     Met with patient at bedside. He is awake and able to participate. Patient is from home alone. He has an adult son who assists as needed. He does not drive - uses insurance based [employer] transport. He is active with the [facility] for R foot care. HD T-Th-Sat. He did have HHC in 1/2025- agreeable to resume if needed. His plan is home. Natali Morales RN     1300 Received VM from Rigoberto Sims- asking for updates, etc. Spoke to patient to obtain permission. Patient denies having [employer] for any form of insurance. Will reach out to  for updates on insurance. Natali Morales RN   
Chart reviewed. Plan home with [facility]. Referral faxed to [facility] on 2/21. PS sent to Linda/MARIAJOSE requesting to know if any other information is required for referral. CM following.    1257 - Referral finished with [facility]. Linda/IMANI following. Per IDR, possible discharge today, Linda aware.   
Followed up with pt in room. From home. Sitting up in bed. States plan remains home, however would like to continue his HHC with CM post discharge. Pt states he struggles with transportation. Uses [facility] for his dialysis needs. Transportation resources placed on AVS at this time. Attempted to contact Linda/[facility] to inform her however PS was not working at this time.   
PS message to Dr Brown with reminder that pt is requesting HC services at discharge. Cm contacted Manda AGUILAR RN at [facility] and advised of planned referral. Referral information to include; facesheet, H&P, ID consult and wound consult faxed to [facility] at fax# [phone].  
Shelly Rubalcava agreed to follow for hhc. Info sent to [facility].  4:14 PM Spoke with pt & demo info reviewed. Noted pt on dc & notified [facility].  
patient's immediate release 30 mg every 4 hours as needed. This regimen seemed to improve patient's abdominal pain to a more manageable level. At this time patient is stable for discharge home.    I have also placed a referral to gastroenterology to evaluate for alternative causes of your persistent abdominal pain.  I do not believe this is all related to your underlying cancer.  This referral has been sent.    Patient is very high risk of returning     Please take medications as prescribed.  Please also plan to call palliative care and home health.  Information has been relayed to patient and she is in agreement with plan.  Thank you and it was a pleasure taking care of you.    Disposition: Home with Home Health  Diet: ADULT DIET; Regular; 4 carb choices (60 gm/meal)  Code Status: Full Code    Follow Ups:  Follow-up Information    None         Future Appointments         Provider Specialty Dept Phone    5/27/2025 9:00 AM PORT Lab [phone]    5/27/2025 9:00 AM Antonieta Mendoza APRN - NP Palliative Care [phone]    5/27/2025 10:00 AM Hannah Angulo NP-C Oncology [phone]    5/27/2025 10:45 AM [facility] Infusion Therapy [phone]    5/29/2025 3:15 PM [facility] Infusion Therapy [phone]    6/20/2025 9:30 AM Oskar Fritz MD Oncology [phone]              Follow up labs/diagnostics (ultimately defer to outpatient provider):  Defer to Follow-up Provider    Plan was discussed with Patient.  All questions answered.  Patient was stable at time of discharge.  Instructions given to call a physician or return if any concerns.        Current Discharge Medication List        START taking these medications    Details   aluminum & magnesium hydroxide-simethicone (MAALOX PLUS) 200-200-20 MG/5ML SUSP suspension Take 30 mLs by mouth every 4 hours as needed for Indigestion  Qty: 355 mL, Refills: 0      ondansetron (ZOFRAN-ODT) 4 MG disintegrating tablet Take 1 tablet by mouth every 8 hours as

## 2025-05-24 ENCOUNTER — HOSPITAL ENCOUNTER (INPATIENT)
Age: 50
LOS: 1 days | Discharge: HOME | End: 2025-05-26
Attending: INTERNAL MEDICINE | Admitting: INTERNAL MEDICINE
Payer: MEDICARE

## 2025-05-24 ENCOUNTER — APPOINTMENT (OUTPATIENT)
Dept: GENERAL RADIOLOGY | Age: 50
End: 2025-05-24
Payer: MEDICARE

## 2025-05-24 DIAGNOSIS — N18.6 ESRD ON DIALYSIS (HCC): ICD-10-CM

## 2025-05-24 DIAGNOSIS — B34.8 PARAINFLUENZA INFECTION: Primary | ICD-10-CM

## 2025-05-24 DIAGNOSIS — Z99.2 ESRD ON DIALYSIS (HCC): ICD-10-CM

## 2025-05-24 LAB
ALBUMIN SERPL-MCNC: 4.2 G/DL (ref 3.4–5)
ALBUMIN/GLOB SERPL: 1.4 {RATIO} (ref 1.1–2.2)
ALP SERPL-CCNC: 84 U/L (ref 40–129)
ALT SERPL-CCNC: 12 U/L (ref 10–40)
ANION GAP SERPL CALCULATED.3IONS-SCNC: 21 MMOL/L (ref 9–17)
ARTERIAL PATENCY WRIST A: NO
AST SERPL-CCNC: 19 U/L (ref 15–37)
B PARAP IS1001 DNA NPH QL NAA+NON-PROBE: NOT DETECTED
B PERT DNA SPEC QL NAA+PROBE: NOT DETECTED
BASOPHILS # BLD: 0.06 K/UL
BASOPHILS NFR BLD: 1 % (ref 0–1)
BILIRUB SERPL-MCNC: 0.6 MG/DL (ref 0–1)
BODY TEMPERATURE: 37
BUN SERPL-MCNC: 61 MG/DL (ref 7–20)
C PNEUM DNA NPH QL NAA+NON-PROBE: NOT DETECTED
CALCIUM SERPL-MCNC: 9.4 MG/DL (ref 8.3–10.6)
CHLORIDE SERPL-SCNC: 89 MMOL/L (ref 99–110)
CO2 SERPL-SCNC: 26 MMOL/L (ref 21–32)
COHGB MFR BLD: 2.3 % (ref 0.5–1.5)
CREAT SERPL-MCNC: 7.4 MG/DL (ref 0.9–1.3)
EOSINOPHIL # BLD: 0.21 K/UL
EOSINOPHILS RELATIVE PERCENT: 2 % (ref 0–3)
ERYTHROCYTE [DISTWIDTH] IN BLOOD BY AUTOMATED COUNT: 15.5 % (ref 11.7–14.9)
FLUAV RNA NPH QL NAA+NON-PROBE: NOT DETECTED
FLUBV RNA NPH QL NAA+NON-PROBE: NOT DETECTED
GFR, ESTIMATED: 8 ML/MIN/1.73M2
GLUCOSE SERPL-MCNC: 182 MG/DL (ref 74–99)
HADV DNA NPH QL NAA+NON-PROBE: NOT DETECTED
HCO3 VENOUS: 26.9 MMOL/L (ref 22–29)
HCOV 229E RNA NPH QL NAA+NON-PROBE: NOT DETECTED
HCOV HKU1 RNA NPH QL NAA+NON-PROBE: NOT DETECTED
HCOV NL63 RNA NPH QL NAA+NON-PROBE: NOT DETECTED
HCOV OC43 RNA NPH QL NAA+NON-PROBE: NOT DETECTED
HCT VFR BLD AUTO: 31.4 % (ref 42–52)
HGB BLD-MCNC: 10.3 G/DL (ref 13.5–18)
HMPV RNA NPH QL NAA+NON-PROBE: NOT DETECTED
HPIV1 RNA NPH QL NAA+NON-PROBE: NOT DETECTED
HPIV2 RNA NPH QL NAA+NON-PROBE: NOT DETECTED
HPIV3 RNA NPH QL NAA+NON-PROBE: DETECTED
HPIV4 RNA NPH QL NAA+NON-PROBE: NOT DETECTED
IMM GRANULOCYTES # BLD AUTO: 0.04 K/UL
IMM GRANULOCYTES NFR BLD: 0 %
LACTATE BLDV-SCNC: 2 MMOL/L (ref 0.4–2)
LYMPHOCYTES NFR BLD: 0.58 K/UL
LYMPHOCYTES RELATIVE PERCENT: 5 % (ref 24–44)
M PNEUMO DNA NPH QL NAA+NON-PROBE: NOT DETECTED
MCH RBC QN AUTO: 32.7 PG (ref 27–31)
MCHC RBC AUTO-ENTMCNC: 32.8 G/DL (ref 32–36)
MCV RBC AUTO: 99.7 FL (ref 78–100)
METHEMOGLOBIN: 0.4 % (ref 0.5–1.5)
MONOCYTES NFR BLD: 0.9 K/UL
MONOCYTES NFR BLD: 8 % (ref 0–5)
NEUTROPHILS NFR BLD: 84 % (ref 36–66)
NEUTS SEG NFR BLD: 9.41 K/UL
OXYHGB MFR BLD: 74.1 %
PCO2 VENOUS: 40.7 MM HG (ref 38–54)
PH VENOUS: 7.44 (ref 7.32–7.43)
PLATELET # BLD AUTO: 216 K/UL (ref 140–440)
PMV BLD AUTO: 10.2 FL (ref 7.5–11.1)
PO2 VENOUS: 43.1 MM HG (ref 23–48)
POSITIVE BASE EXCESS, VEN: 2.5 MMOL/L (ref 0–3)
POTASSIUM SERPL-SCNC: 5.2 MMOL/L (ref 3.5–5.1)
PROCALCITONIN SERPL-MCNC: 1.2 NG/ML
PROT SERPL-MCNC: 7.2 G/DL (ref 6.4–8.2)
RBC # BLD AUTO: 3.15 M/UL (ref 4.6–6.2)
RSV RNA NPH QL NAA+NON-PROBE: NOT DETECTED
RV+EV RNA NPH QL NAA+NON-PROBE: NOT DETECTED
SARS-COV-2 RNA NPH QL NAA+NON-PROBE: NOT DETECTED
SODIUM SERPL-SCNC: 137 MMOL/L (ref 136–145)
SPECIMEN DESCRIPTION: ABNORMAL
TROPONIN I SERPL HS-MCNC: 148 NG/L (ref 0–22)
TROPONIN I SERPL HS-MCNC: 157 NG/L (ref 0–22)
WBC OTHER # BLD: 11.2 K/UL (ref 4–10.5)

## 2025-05-24 PROCEDURE — 96365 THER/PROPH/DIAG IV INF INIT: CPT

## 2025-05-24 PROCEDURE — 84145 PROCALCITONIN (PCT): CPT

## 2025-05-24 PROCEDURE — 87040 BLOOD CULTURE FOR BACTERIA: CPT

## 2025-05-24 PROCEDURE — 6360000002 HC RX W HCPCS: Performed by: PHYSICIAN ASSISTANT

## 2025-05-24 PROCEDURE — 93005 ELECTROCARDIOGRAM TRACING: CPT | Performed by: PHYSICIAN ASSISTANT

## 2025-05-24 PROCEDURE — 84484 ASSAY OF TROPONIN QUANT: CPT

## 2025-05-24 PROCEDURE — 83605 ASSAY OF LACTIC ACID: CPT

## 2025-05-24 PROCEDURE — 82805 BLOOD GASES W/O2 SATURATION: CPT

## 2025-05-24 PROCEDURE — 2500000003 HC RX 250 WO HCPCS: Performed by: PHYSICIAN ASSISTANT

## 2025-05-24 PROCEDURE — 96367 TX/PROPH/DG ADDL SEQ IV INF: CPT

## 2025-05-24 PROCEDURE — 99285 EMERGENCY DEPT VISIT HI MDM: CPT

## 2025-05-24 PROCEDURE — 96375 TX/PRO/DX INJ NEW DRUG ADDON: CPT

## 2025-05-24 PROCEDURE — 6370000000 HC RX 637 (ALT 250 FOR IP): Performed by: PHYSICIAN ASSISTANT

## 2025-05-24 PROCEDURE — 36415 COLL VENOUS BLD VENIPUNCTURE: CPT

## 2025-05-24 PROCEDURE — 0202U NFCT DS 22 TRGT SARS-COV-2: CPT

## 2025-05-24 PROCEDURE — 85025 COMPLETE CBC W/AUTO DIFF WBC: CPT

## 2025-05-24 PROCEDURE — 71045 X-RAY EXAM CHEST 1 VIEW: CPT

## 2025-05-24 PROCEDURE — 94640 AIRWAY INHALATION TREATMENT: CPT

## 2025-05-24 PROCEDURE — 80053 COMPREHEN METABOLIC PANEL: CPT

## 2025-05-24 PROCEDURE — 2580000003 HC RX 258: Performed by: PHYSICIAN ASSISTANT

## 2025-05-24 PROCEDURE — 0JBR0ZZ EXCISION OF LEFT FOOT SUBCUTANEOUS TISSUE AND FASCIA, OPEN APPROACH: ICD-10-PCS | Performed by: STUDENT IN AN ORGANIZED HEALTH CARE EDUCATION/TRAINING PROGRAM

## 2025-05-24 RX ORDER — ONDANSETRON 2 MG/ML
4 INJECTION INTRAMUSCULAR; INTRAVENOUS EVERY 6 HOURS PRN
Status: DISCONTINUED | OUTPATIENT
Start: 2025-05-24 | End: 2025-05-25 | Stop reason: SDUPTHER

## 2025-05-24 RX ORDER — IPRATROPIUM BROMIDE AND ALBUTEROL SULFATE 2.5; .5 MG/3ML; MG/3ML
1 SOLUTION RESPIRATORY (INHALATION) ONCE
Status: COMPLETED | OUTPATIENT
Start: 2025-05-24 | End: 2025-05-24

## 2025-05-24 RX ORDER — CODEINE PHOSPHATE AND GUAIFENESIN 10; 100 MG/5ML; MG/5ML
5 SOLUTION ORAL ONCE
Status: COMPLETED | OUTPATIENT
Start: 2025-05-24 | End: 2025-05-24

## 2025-05-24 RX ADMIN — ONDANSETRON 4 MG: 2 INJECTION INTRAMUSCULAR; INTRAVENOUS at 21:21

## 2025-05-24 RX ADMIN — WATER 125 MG: 1 INJECTION INTRAMUSCULAR; INTRAVENOUS; SUBCUTANEOUS at 21:21

## 2025-05-24 RX ADMIN — IPRATROPIUM BROMIDE AND ALBUTEROL SULFATE 1 DOSE: 2.5; .5 SOLUTION RESPIRATORY (INHALATION) at 21:26

## 2025-05-24 RX ADMIN — Medication 2500 MG: at 23:50

## 2025-05-24 RX ADMIN — Medication 5 ML: at 21:21

## 2025-05-24 RX ADMIN — CEFEPIME 2000 MG: 2 INJECTION, POWDER, FOR SOLUTION INTRAVENOUS at 23:06

## 2025-05-24 ASSESSMENT — PAIN DESCRIPTION - LOCATION: LOCATION: BACK

## 2025-05-24 ASSESSMENT — PAIN SCALES - GENERAL: PAINLEVEL_OUTOF10: 10

## 2025-05-24 ASSESSMENT — PAIN DESCRIPTION - DESCRIPTORS: DESCRIPTORS: ACHING

## 2025-05-24 ASSESSMENT — PAIN - FUNCTIONAL ASSESSMENT: PAIN_FUNCTIONAL_ASSESSMENT: 0-10

## 2025-05-25 PROBLEM — J96.21 ACUTE ON CHRONIC HYPOXIC RESPIRATORY FAILURE (HCC): Status: ACTIVE | Noted: 2025-05-25

## 2025-05-25 LAB
ANION GAP SERPL CALCULATED.3IONS-SCNC: 21 MMOL/L (ref 9–17)
ARTERIAL PATENCY WRIST A: ABNORMAL
BASOPHILS # BLD: 0.03 K/UL
BASOPHILS NFR BLD: 0 % (ref 0–1)
BNP SERPL-MCNC: ABNORMAL PG/ML (ref 0–125)
BODY TEMPERATURE: 37
BUN SERPL-MCNC: 69 MG/DL (ref 7–20)
CALCIUM SERPL-MCNC: 9 MG/DL (ref 8.3–10.6)
CHLORIDE SERPL-SCNC: 88 MMOL/L (ref 99–110)
CO2 SERPL-SCNC: 22 MMOL/L (ref 21–32)
COHGB MFR BLD: 1.1 % (ref 0.5–1.5)
CREAT SERPL-MCNC: 7.7 MG/DL (ref 0.9–1.3)
EOSINOPHIL # BLD: 0.02 K/UL
EOSINOPHILS RELATIVE PERCENT: 0 % (ref 0–3)
ERYTHROCYTE [DISTWIDTH] IN BLOOD BY AUTOMATED COUNT: 15.5 % (ref 11.7–14.9)
GFR, ESTIMATED: 8 ML/MIN/1.73M2
GLUCOSE BLD-MCNC: 261 MG/DL (ref 74–99)
GLUCOSE BLD-MCNC: 294 MG/DL (ref 74–99)
GLUCOSE BLD-MCNC: 333 MG/DL (ref 74–99)
GLUCOSE BLD-MCNC: 418 MG/DL (ref 74–99)
GLUCOSE SERPL-MCNC: 295 MG/DL (ref 74–99)
HCO3 VENOUS: 24.5 MMOL/L (ref 22–29)
HCT VFR BLD AUTO: 30.8 % (ref 42–52)
HGB BLD-MCNC: 9.8 G/DL (ref 13.5–18)
IMM GRANULOCYTES # BLD AUTO: 0.07 K/UL
IMM GRANULOCYTES NFR BLD: 1 %
LACTATE BLDV-SCNC: 1.5 MMOL/L (ref 0.4–2)
LYMPHOCYTES NFR BLD: 0.25 K/UL
LYMPHOCYTES RELATIVE PERCENT: 2 % (ref 24–44)
MAGNESIUM SERPL-MCNC: 2.5 MG/DL (ref 1.8–2.4)
MCH RBC QN AUTO: 32.5 PG (ref 27–31)
MCHC RBC AUTO-ENTMCNC: 31.8 G/DL (ref 32–36)
MCV RBC AUTO: 102 FL (ref 78–100)
METHEMOGLOBIN: 0.3 % (ref 0.5–1.5)
MONOCYTES NFR BLD: 0.15 K/UL
MONOCYTES NFR BLD: 1 % (ref 0–5)
NEGATIVE BASE EXCESS, VEN: 1.6 MMOL/L (ref 0–3)
NEUTROPHILS NFR BLD: 97 % (ref 36–66)
NEUTS SEG NFR BLD: 14.71 K/UL
OXYHGB MFR BLD: 65.6 %
PCO2 VENOUS: 47.3 MM HG (ref 38–54)
PH VENOUS: 7.33 (ref 7.32–7.43)
PLATELET # BLD AUTO: 196 K/UL (ref 140–440)
PMV BLD AUTO: 10.2 FL (ref 7.5–11.1)
PO2 VENOUS: 40 MM HG (ref 23–48)
POTASSIUM SERPL-SCNC: 5.6 MMOL/L (ref 3.5–5.1)
RBC # BLD AUTO: 3.02 M/UL (ref 4.6–6.2)
SODIUM SERPL-SCNC: 130 MMOL/L (ref 136–145)
WBC OTHER # BLD: 15.2 K/UL (ref 4–10.5)

## 2025-05-25 PROCEDURE — 6370000000 HC RX 637 (ALT 250 FOR IP): Performed by: INTERNAL MEDICINE

## 2025-05-25 PROCEDURE — 94640 AIRWAY INHALATION TREATMENT: CPT

## 2025-05-25 PROCEDURE — 6360000002 HC RX W HCPCS: Performed by: INTERNAL MEDICINE

## 2025-05-25 PROCEDURE — 36415 COLL VENOUS BLD VENIPUNCTURE: CPT

## 2025-05-25 PROCEDURE — 82962 GLUCOSE BLOOD TEST: CPT

## 2025-05-25 PROCEDURE — 83880 ASSAY OF NATRIURETIC PEPTIDE: CPT

## 2025-05-25 PROCEDURE — 2580000003 HC RX 258: Performed by: INTERNAL MEDICINE

## 2025-05-25 PROCEDURE — 6360000002 HC RX W HCPCS: Performed by: PHYSICIAN ASSISTANT

## 2025-05-25 PROCEDURE — 83605 ASSAY OF LACTIC ACID: CPT

## 2025-05-25 PROCEDURE — 83735 ASSAY OF MAGNESIUM: CPT

## 2025-05-25 PROCEDURE — 82805 BLOOD GASES W/O2 SATURATION: CPT

## 2025-05-25 PROCEDURE — 2500000003 HC RX 250 WO HCPCS: Performed by: INTERNAL MEDICINE

## 2025-05-25 PROCEDURE — 2700000000 HC OXYGEN THERAPY PER DAY

## 2025-05-25 PROCEDURE — 1200000000 HC SEMI PRIVATE

## 2025-05-25 PROCEDURE — 80048 BASIC METABOLIC PNL TOTAL CA: CPT

## 2025-05-25 PROCEDURE — 85025 COMPLETE CBC W/AUTO DIFF WBC: CPT

## 2025-05-25 PROCEDURE — 96375 TX/PRO/DX INJ NEW DRUG ADDON: CPT

## 2025-05-25 PROCEDURE — 94761 N-INVAS EAR/PLS OXIMETRY MLT: CPT

## 2025-05-25 PROCEDURE — 6370000000 HC RX 637 (ALT 250 FOR IP): Performed by: PHYSICIAN ASSISTANT

## 2025-05-25 PROCEDURE — 5A1D70Z PERFORMANCE OF URINARY FILTRATION, INTERMITTENT, LESS THAN 6 HOURS PER DAY: ICD-10-PCS | Performed by: INTERNAL MEDICINE

## 2025-05-25 RX ORDER — ALBUTEROL SULFATE 0.83 MG/ML
10 SOLUTION RESPIRATORY (INHALATION) ONCE
Status: COMPLETED | OUTPATIENT
Start: 2025-05-25 | End: 2025-05-25

## 2025-05-25 RX ORDER — SODIUM CHLORIDE 9 MG/ML
INJECTION, SOLUTION INTRAVENOUS PRN
Status: DISCONTINUED | OUTPATIENT
Start: 2025-05-25 | End: 2025-05-26 | Stop reason: HOSPADM

## 2025-05-25 RX ORDER — ALBUTEROL SULFATE 90 UG/1
2 INHALANT RESPIRATORY (INHALATION) 4 TIMES DAILY PRN
Status: DISCONTINUED | OUTPATIENT
Start: 2025-05-25 | End: 2025-05-26 | Stop reason: HOSPADM

## 2025-05-25 RX ORDER — ONDANSETRON 4 MG/1
4 TABLET, ORALLY DISINTEGRATING ORAL EVERY 8 HOURS PRN
Status: DISCONTINUED | OUTPATIENT
Start: 2025-05-25 | End: 2025-05-26 | Stop reason: HOSPADM

## 2025-05-25 RX ORDER — LINEZOLID 600 MG/1
600 TABLET, FILM COATED ORAL EVERY 12 HOURS SCHEDULED
Status: DISCONTINUED | OUTPATIENT
Start: 2025-05-25 | End: 2025-05-26 | Stop reason: HOSPADM

## 2025-05-25 RX ORDER — GENTAMICIN SULFATE 1 MG/G
OINTMENT TOPICAL DAILY
Status: DISCONTINUED | OUTPATIENT
Start: 2025-05-25 | End: 2025-05-26 | Stop reason: HOSPADM

## 2025-05-25 RX ORDER — OXYCODONE AND ACETAMINOPHEN 7.5; 325 MG/1; MG/1
1 TABLET ORAL EVERY 8 HOURS PRN
Status: DISCONTINUED | OUTPATIENT
Start: 2025-05-25 | End: 2025-05-26 | Stop reason: HOSPADM

## 2025-05-25 RX ORDER — HYDRALAZINE HYDROCHLORIDE 25 MG/1
25 TABLET, FILM COATED ORAL 2 TIMES DAILY
Status: DISCONTINUED | OUTPATIENT
Start: 2025-05-25 | End: 2025-05-26 | Stop reason: HOSPADM

## 2025-05-25 RX ORDER — HEPARIN SODIUM 5000 [USP'U]/ML
5000 INJECTION, SOLUTION INTRAVENOUS; SUBCUTANEOUS EVERY 8 HOURS SCHEDULED
Status: DISCONTINUED | OUTPATIENT
Start: 2025-05-25 | End: 2025-05-26 | Stop reason: HOSPADM

## 2025-05-25 RX ORDER — ONDANSETRON 2 MG/ML
4 INJECTION INTRAMUSCULAR; INTRAVENOUS EVERY 6 HOURS PRN
Status: DISCONTINUED | OUTPATIENT
Start: 2025-05-25 | End: 2025-05-26 | Stop reason: HOSPADM

## 2025-05-25 RX ORDER — PREDNISONE 20 MG/1
40 TABLET ORAL DAILY
Status: DISCONTINUED | OUTPATIENT
Start: 2025-05-25 | End: 2025-05-26 | Stop reason: HOSPADM

## 2025-05-25 RX ORDER — DEXTROSE MONOHYDRATE 100 MG/ML
INJECTION, SOLUTION INTRAVENOUS CONTINUOUS PRN
Status: DISCONTINUED | OUTPATIENT
Start: 2025-05-25 | End: 2025-05-26 | Stop reason: HOSPADM

## 2025-05-25 RX ORDER — CALCITRIOL 0.25 UG/1
0.5 CAPSULE, LIQUID FILLED ORAL 2 TIMES DAILY
Status: DISCONTINUED | OUTPATIENT
Start: 2025-05-25 | End: 2025-05-26 | Stop reason: HOSPADM

## 2025-05-25 RX ORDER — BUDESONIDE AND FORMOTEROL FUMARATE DIHYDRATE 160; 4.5 UG/1; UG/1
2 AEROSOL RESPIRATORY (INHALATION)
Status: DISCONTINUED | OUTPATIENT
Start: 2025-05-25 | End: 2025-05-26 | Stop reason: HOSPADM

## 2025-05-25 RX ORDER — ISOSORBIDE MONONITRATE 30 MG/1
15 TABLET, EXTENDED RELEASE ORAL DAILY
Status: DISCONTINUED | OUTPATIENT
Start: 2025-05-25 | End: 2025-05-26 | Stop reason: HOSPADM

## 2025-05-25 RX ORDER — OXYCODONE AND ACETAMINOPHEN 7.5; 325 MG/1; MG/1
1 TABLET ORAL ONCE
Refills: 0 | Status: COMPLETED | OUTPATIENT
Start: 2025-05-25 | End: 2025-05-25

## 2025-05-25 RX ORDER — FUROSEMIDE 10 MG/ML
40 INJECTION INTRAMUSCULAR; INTRAVENOUS 2 TIMES DAILY
Status: DISCONTINUED | OUTPATIENT
Start: 2025-05-25 | End: 2025-05-26 | Stop reason: HOSPADM

## 2025-05-25 RX ORDER — ATORVASTATIN CALCIUM 40 MG/1
40 TABLET, FILM COATED ORAL NIGHTLY
Status: DISCONTINUED | OUTPATIENT
Start: 2025-05-25 | End: 2025-05-26 | Stop reason: HOSPADM

## 2025-05-25 RX ORDER — ACETAMINOPHEN 325 MG/1
650 TABLET ORAL EVERY 6 HOURS PRN
Status: DISCONTINUED | OUTPATIENT
Start: 2025-05-25 | End: 2025-05-26 | Stop reason: HOSPADM

## 2025-05-25 RX ORDER — GLUCAGON 1 MG/ML
1 KIT INJECTION PRN
Status: DISCONTINUED | OUTPATIENT
Start: 2025-05-25 | End: 2025-05-26 | Stop reason: HOSPADM

## 2025-05-25 RX ORDER — DIPHENHYDRAMINE HYDROCHLORIDE 50 MG/ML
50 INJECTION, SOLUTION INTRAMUSCULAR; INTRAVENOUS ONCE
Status: COMPLETED | OUTPATIENT
Start: 2025-05-25 | End: 2025-05-25

## 2025-05-25 RX ORDER — INDOMETHACIN 25 MG/1
50 CAPSULE ORAL ONCE
Status: COMPLETED | OUTPATIENT
Start: 2025-05-25 | End: 2025-05-25

## 2025-05-25 RX ORDER — IPRATROPIUM BROMIDE AND ALBUTEROL SULFATE 2.5; .5 MG/3ML; MG/3ML
1 SOLUTION RESPIRATORY (INHALATION)
Status: DISCONTINUED | OUTPATIENT
Start: 2025-05-25 | End: 2025-05-26 | Stop reason: HOSPADM

## 2025-05-25 RX ORDER — SODIUM CHLORIDE 0.9 % (FLUSH) 0.9 %
5-40 SYRINGE (ML) INJECTION PRN
Status: DISCONTINUED | OUTPATIENT
Start: 2025-05-25 | End: 2025-05-26 | Stop reason: HOSPADM

## 2025-05-25 RX ORDER — FUROSEMIDE 40 MG/1
40 TABLET ORAL DAILY
Status: DISCONTINUED | OUTPATIENT
Start: 2025-05-25 | End: 2025-05-25 | Stop reason: DRUGHIGH

## 2025-05-25 RX ORDER — POLYETHYLENE GLYCOL 3350 17 G/17G
17 POWDER, FOR SOLUTION ORAL DAILY PRN
Status: DISCONTINUED | OUTPATIENT
Start: 2025-05-25 | End: 2025-05-26 | Stop reason: HOSPADM

## 2025-05-25 RX ORDER — CLOPIDOGREL BISULFATE 75 MG/1
75 TABLET ORAL DAILY
Status: DISCONTINUED | OUTPATIENT
Start: 2025-05-25 | End: 2025-05-26 | Stop reason: HOSPADM

## 2025-05-25 RX ORDER — ROPINIROLE 0.25 MG/1
0.25 TABLET, FILM COATED ORAL NIGHTLY
Status: DISCONTINUED | OUTPATIENT
Start: 2025-05-25 | End: 2025-05-26 | Stop reason: HOSPADM

## 2025-05-25 RX ORDER — ACETAMINOPHEN 650 MG/1
650 SUPPOSITORY RECTAL EVERY 6 HOURS PRN
Status: DISCONTINUED | OUTPATIENT
Start: 2025-05-25 | End: 2025-05-26 | Stop reason: HOSPADM

## 2025-05-25 RX ORDER — INSULIN LISPRO 100 [IU]/ML
0-8 INJECTION, SOLUTION INTRAVENOUS; SUBCUTANEOUS
Status: DISCONTINUED | OUTPATIENT
Start: 2025-05-25 | End: 2025-05-26 | Stop reason: HOSPADM

## 2025-05-25 RX ORDER — ASPIRIN 81 MG/1
81 TABLET, CHEWABLE ORAL DAILY
Status: DISCONTINUED | OUTPATIENT
Start: 2025-05-25 | End: 2025-05-26 | Stop reason: HOSPADM

## 2025-05-25 RX ORDER — METOPROLOL SUCCINATE 25 MG/1
25 TABLET, EXTENDED RELEASE ORAL DAILY
Status: DISCONTINUED | OUTPATIENT
Start: 2025-05-25 | End: 2025-05-26 | Stop reason: HOSPADM

## 2025-05-25 RX ORDER — SODIUM CHLORIDE 0.9 % (FLUSH) 0.9 %
5-40 SYRINGE (ML) INJECTION EVERY 12 HOURS SCHEDULED
Status: DISCONTINUED | OUTPATIENT
Start: 2025-05-25 | End: 2025-05-26 | Stop reason: HOSPADM

## 2025-05-25 RX ORDER — ENOXAPARIN SODIUM 100 MG/ML
30 INJECTION SUBCUTANEOUS DAILY
Status: DISCONTINUED | OUTPATIENT
Start: 2025-05-25 | End: 2025-05-25

## 2025-05-25 RX ORDER — INSULIN GLARGINE 100 [IU]/ML
15 INJECTION, SOLUTION SUBCUTANEOUS NIGHTLY
Status: DISCONTINUED | OUTPATIENT
Start: 2025-05-25 | End: 2025-05-26 | Stop reason: HOSPADM

## 2025-05-25 RX ADMIN — INSULIN LISPRO 4 UNITS: 100 INJECTION, SOLUTION INTRAVENOUS; SUBCUTANEOUS at 21:56

## 2025-05-25 RX ADMIN — LINEZOLID 600 MG: 600 TABLET, FILM COATED ORAL at 21:57

## 2025-05-25 RX ADMIN — OXYCODONE HYDROCHLORIDE AND ACETAMINOPHEN 1 TABLET: 7.5; 325 TABLET ORAL at 08:04

## 2025-05-25 RX ADMIN — INSULIN HUMAN 10 UNITS: 100 INJECTION, SOLUTION PARENTERAL at 08:44

## 2025-05-25 RX ADMIN — HYDRALAZINE HYDROCHLORIDE 25 MG: 25 TABLET ORAL at 21:57

## 2025-05-25 RX ADMIN — HEPARIN SODIUM 5000 UNITS: 5000 INJECTION INTRAVENOUS; SUBCUTANEOUS at 15:47

## 2025-05-25 RX ADMIN — DEXTROSE 250 ML: 10 SOLUTION INTRAVENOUS at 08:53

## 2025-05-25 RX ADMIN — ATORVASTATIN CALCIUM 40 MG: 40 TABLET, FILM COATED ORAL at 21:58

## 2025-05-25 RX ADMIN — OXYCODONE HYDROCHLORIDE AND ACETAMINOPHEN 1 TABLET: 7.5; 325 TABLET ORAL at 00:31

## 2025-05-25 RX ADMIN — CALCITRIOL CAPSULES 0.25 MCG 0.5 MCG: 0.25 CAPSULE ORAL at 21:57

## 2025-05-25 RX ADMIN — ISOSORBIDE MONONITRATE 15 MG: 30 TABLET, EXTENDED RELEASE ORAL at 08:04

## 2025-05-25 RX ADMIN — IPRATROPIUM BROMIDE AND ALBUTEROL SULFATE 1 DOSE: .5; 2.5 SOLUTION RESPIRATORY (INHALATION) at 12:48

## 2025-05-25 RX ADMIN — INSULIN LISPRO 6 UNITS: 100 INJECTION, SOLUTION INTRAVENOUS; SUBCUTANEOUS at 03:28

## 2025-05-25 RX ADMIN — SODIUM BICARBONATE 50 MEQ: 84 INJECTION, SOLUTION INTRAVENOUS at 08:38

## 2025-05-25 RX ADMIN — CEFEPIME 1000 MG: 1 INJECTION, POWDER, FOR SOLUTION INTRAMUSCULAR; INTRAVENOUS at 23:18

## 2025-05-25 RX ADMIN — HEPARIN SODIUM 5000 UNITS: 5000 INJECTION INTRAVENOUS; SUBCUTANEOUS at 21:58

## 2025-05-25 RX ADMIN — PREDNISONE 40 MG: 20 TABLET ORAL at 08:05

## 2025-05-25 RX ADMIN — BUDESONIDE AND FORMOTEROL FUMARATE DIHYDRATE 2 PUFF: 160; 4.5 AEROSOL RESPIRATORY (INHALATION) at 08:30

## 2025-05-25 RX ADMIN — INSULIN GLARGINE 15 UNITS: 100 INJECTION, SOLUTION SUBCUTANEOUS at 21:57

## 2025-05-25 RX ADMIN — LINEZOLID 600 MG: 600 TABLET, FILM COATED ORAL at 08:53

## 2025-05-25 RX ADMIN — FUROSEMIDE 40 MG: 10 INJECTION, SOLUTION INTRAMUSCULAR; INTRAVENOUS at 08:06

## 2025-05-25 RX ADMIN — METOPROLOL SUCCINATE 25 MG: 25 TABLET, EXTENDED RELEASE ORAL at 08:05

## 2025-05-25 RX ADMIN — INSULIN LISPRO 6 UNITS: 100 INJECTION, SOLUTION INTRAVENOUS; SUBCUTANEOUS at 11:30

## 2025-05-25 RX ADMIN — DIPHENHYDRAMINE HYDROCHLORIDE 50 MG: 50 INJECTION, SOLUTION INTRAMUSCULAR; INTRAVENOUS at 00:17

## 2025-05-25 RX ADMIN — HYDRALAZINE HYDROCHLORIDE 25 MG: 25 TABLET ORAL at 08:04

## 2025-05-25 RX ADMIN — HEPARIN SODIUM 5000 UNITS: 5000 INJECTION INTRAVENOUS; SUBCUTANEOUS at 05:52

## 2025-05-25 RX ADMIN — CALCITRIOL CAPSULES 0.25 MCG 0.5 MCG: 0.25 CAPSULE ORAL at 03:29

## 2025-05-25 RX ADMIN — SODIUM ZIRCONIUM CYCLOSILICATE 10 G: 10 POWDER, FOR SUSPENSION ORAL at 15:48

## 2025-05-25 RX ADMIN — INSULIN LISPRO 8 UNITS: 100 INJECTION, SOLUTION INTRAVENOUS; SUBCUTANEOUS at 17:02

## 2025-05-25 RX ADMIN — INSULIN LISPRO 4 UNITS: 100 INJECTION, SOLUTION INTRAVENOUS; SUBCUTANEOUS at 08:05

## 2025-05-25 RX ADMIN — GENTAMICIN SULFATE: 1 OINTMENT TOPICAL at 08:54

## 2025-05-25 RX ADMIN — CALCITRIOL CAPSULES 0.25 MCG 0.5 MCG: 0.25 CAPSULE ORAL at 08:03

## 2025-05-25 RX ADMIN — SODIUM CHLORIDE, PRESERVATIVE FREE 10 ML: 5 INJECTION INTRAVENOUS at 21:58

## 2025-05-25 RX ADMIN — OXYCODONE HYDROCHLORIDE AND ACETAMINOPHEN 1 TABLET: 7.5; 325 TABLET ORAL at 17:01

## 2025-05-25 RX ADMIN — IPRATROPIUM BROMIDE AND ALBUTEROL SULFATE 1 DOSE: .5; 2.5 SOLUTION RESPIRATORY (INHALATION) at 21:30

## 2025-05-25 RX ADMIN — ALBUTEROL SULFATE 10 MG: 2.5 SOLUTION RESPIRATORY (INHALATION) at 08:26

## 2025-05-25 RX ADMIN — SODIUM CHLORIDE, PRESERVATIVE FREE 10 ML: 5 INJECTION INTRAVENOUS at 08:55

## 2025-05-25 RX ADMIN — HYDRALAZINE HYDROCHLORIDE 25 MG: 25 TABLET ORAL at 03:29

## 2025-05-25 RX ADMIN — SODIUM ZIRCONIUM CYCLOSILICATE 10 G: 10 POWDER, FOR SUSPENSION ORAL at 08:07

## 2025-05-25 RX ADMIN — IPRATROPIUM BROMIDE AND ALBUTEROL SULFATE 1 DOSE: .5; 2.5 SOLUTION RESPIRATORY (INHALATION) at 16:01

## 2025-05-25 RX ADMIN — FUROSEMIDE 40 MG: 10 INJECTION, SOLUTION INTRAMUSCULAR; INTRAVENOUS at 17:02

## 2025-05-25 RX ADMIN — SODIUM ZIRCONIUM CYCLOSILICATE 10 G: 10 POWDER, FOR SUSPENSION ORAL at 21:57

## 2025-05-25 RX ADMIN — CLOPIDOGREL BISULFATE 75 MG: 75 TABLET, FILM COATED ORAL at 08:04

## 2025-05-25 RX ADMIN — ROPINIROLE HYDROCHLORIDE 0.25 MG: 0.25 TABLET, FILM COATED ORAL at 21:57

## 2025-05-25 RX ADMIN — ASPIRIN 81 MG: 81 TABLET, CHEWABLE ORAL at 08:05

## 2025-05-25 RX ADMIN — BUDESONIDE AND FORMOTEROL FUMARATE DIHYDRATE 2 PUFF: 160; 4.5 AEROSOL RESPIRATORY (INHALATION) at 21:30

## 2025-05-25 ASSESSMENT — PAIN SCALES - GENERAL
PAINLEVEL_OUTOF10: 0
PAINLEVEL_OUTOF10: 0
PAINLEVEL_OUTOF10: 7
PAINLEVEL_OUTOF10: 7
PAINLEVEL_OUTOF10: 6

## 2025-05-25 ASSESSMENT — PAIN DESCRIPTION - LOCATION
LOCATION: FOOT;BACK
LOCATION: BACK;FOOT
LOCATION: BACK;FOOT
LOCATION: BACK

## 2025-05-25 ASSESSMENT — PAIN DESCRIPTION - PAIN TYPE
TYPE: CHRONIC PAIN
TYPE: CHRONIC PAIN

## 2025-05-25 ASSESSMENT — PAIN DESCRIPTION - DESCRIPTORS
DESCRIPTORS: ACHING
DESCRIPTORS: SHARP
DESCRIPTORS: ACHING

## 2025-05-25 ASSESSMENT — PAIN DESCRIPTION - ORIENTATION
ORIENTATION: LOWER
ORIENTATION: LOWER;LEFT;RIGHT
ORIENTATION: RIGHT;LEFT

## 2025-05-25 ASSESSMENT — PAIN DESCRIPTION - ONSET: ONSET: ON-GOING

## 2025-05-25 ASSESSMENT — PAIN DESCRIPTION - FREQUENCY: FREQUENCY: CONTINUOUS

## 2025-05-25 NOTE — ED NOTES
ED TO INPATIENT SBAR HANDOFF    Patient Name: Yovanny Levine   :  1975  50 y.o.   Preferred Name  Yovanny  Family/Caregiver Present no   Restraints no   C-SSRS: Risk of Suicide: No Risk  Sitter no   Sepsis Risk Score Sepsis V2 Risk Score: 32.2      Situation  Chief Complaint   Patient presents with    Shortness of Breath    Nausea    Pharyngitis     Brief Description of Patient's Condition: Patient to the ED for shortness of breath and generalized fatigue. Patient is a dialysis patient and reports he has not missed any treatments. Reports he has not felt well since Thursday. Patient is positive for parainfluenza on PCR today. Sepsis workup completed and pending vanc from pharmacy.  Mental Status: oriented, alert, coherent, logical, thought processes intact, and able to concentrate and follow conversation  Arrived from: home    Imaging:   XR CHEST PORTABLE   Final Result        Abnormal labs:   Abnormal Labs Reviewed   RESPIRATORY PANEL, MOLECULAR, WITH COVID-19 - Abnormal; Notable for the following components:       Result Value    Parainfluenza 3 PCR DETECTED (*)     All other components within normal limits   CBC WITH AUTO DIFFERENTIAL - Abnormal; Notable for the following components:    WBC 11.2 (*)     RBC 3.15 (*)     Hemoglobin 10.3 (*)     Hematocrit 31.4 (*)     MCH 32.7 (*)     RDW 15.5 (*)     Neutrophils % 84 (*)     Lymphocytes % 5 (*)     Monocytes % 8 (*)     All other components within normal limits   COMPREHENSIVE METABOLIC PANEL - Abnormal; Notable for the following components:    Potassium 5.2 (*)     Chloride 89 (*)     Anion Gap 21 (*)     Glucose 182 (*)     BUN 61 (*)     Creatinine 7.4 (*)     Est, Glom Filt Rate 8 (*)     All other components within normal limits   TROPONIN - Abnormal; Notable for the following components:    Troponin, High Sensitivity 157 (*)     All other components within normal limits   TROPONIN - Abnormal; Notable for the following components:    Troponin, High  Sensitivity 148 (*)     All other components within normal limits   BLOOD GAS, VENOUS - Abnormal; Notable for the following components:    pH, Néstor 7.438 (*)     Carboxyhemoglobin 2.3 (*)     Methemoglobin 0.4 (*)     All other components within normal limits        Background  History:   Past Medical History:   Diagnosis Date    Abscess 2010    scrotal    Acute renal failure (ARF) 08/04/2019    Anxiety associated with depression     Back pain 07/02/2012    CAD (coronary artery disease) 02/10/2023    Chest pain 05/01/2013    Diabetic neuropathy ([facility]) 12/22/2011    Diabetic ulcer of left midfoot associated with type 2 diabetes mellitus, with fat layer exposed ([facility]) 07/18/2017    Diverticulosis     C scope + Dr. Medina    DM (diabetes mellitus), type 2 ([facility]) 2002    DR. 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 Medina    Nausea and vomiting 01/11/2019    Necrotizing fasciitis ([facility])     Nose fracture 1988    Panic attacks     Pericarditis 2003    Hospitalized with s/p heart cath normal    Peripheral autonomic neuropathy due to diabetes mellitus ([facility])     Axonal EMG- NCS, March 2011    Sebaceous cyst 09/01/2011    URI (upper respiratory infection) 02/27/2012    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 type 2 diabetes mellitus, with muscle involvement without evidence of necrosis ([facility]) 09/21/2021    WD-Wound, surgical, infected, initial encounter 12/08/2017    Wrist fracture 1986       Assessment    Vitals:    Level of Consciousness: Alert (0)   Vitals:    05/24/25 2230 05/24/25 2245 05/24/25 2300 05/24/25 2320   BP: (!) 166/127  (!) 180/98    Pulse: 100 100 99 99   Resp: 29 20 17 24

## 2025-05-25 NOTE — PROGRESS NOTES
RENAL DOSE ADJUSTMENT MADE PER P/T PROTOCOL    PREVIOUS ORDER:  Cefepime 2000 mg q24h    Estimated Creatinine Clearance: 15 mL/min (A) (based on SCr of 7.4 mg/dL (H)).  Recent Labs     05/24/25 2056   BUN 61*   CREATININE 7.4*        NEW RENALLY ADJUSTED ORDER:  Cefepime 1000 mg q24h [Extended infusion]    ESRD on HD    Yefrideborah Cazares [facility]  5/25/2025 2:38 AM
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
## 2025-05-25 NOTE — PROGRESS NOTES
4 Eyes Skin Assessment     NAME:  Yovanny Levine  YOB: 1975  MEDICAL RECORD NUMBER:  [id number]    The patient is being assessed for  Admission    I agree that at least one RN has performed a thorough Head to Toe Skin Assessment on the patient. ALL assessment sites listed below have been assessed.      Areas assessed by both nurses:    Head, Face, Ears, Shoulders, Back, Chest, Arms, Elbows, Hands, Sacrum. Buttock, Coccyx, Ischium, and Legs. Feet and Heels        Does the Patient have a Wound? Yes wound(s) were present on assessment. LDA wound assessment was Initiated and completed by RN       Pradeep Prevention initiated by RN: Yes  Wound Care Orders initiated by RN: Yes    Pressure Injury (Stage 3,4, Unstageable, DTI, NWPT, and Complex wounds) if present, place Wound referral order by RN under : No    New Ostomies, if present place, Ostomy referral order under : No     Nurse 1 eSignature: Electronically signed by Mayo Grover RN on 5/25/25 at 3:12 AM EDT    **SHARE this note so that the co-signing nurse can place an eSignature**    Nurse 2 eSignature: Electronically signed by Westley Hutson RN on 5/25/25 at 3:14 AM EDT

## 2025-05-25 NOTE — ED PROVIDER NOTES
EMERGENCY DEPARTMENT ENCOUNTER        Pt Name: Yovanny Levine  MRN: [id number]  Birthdate 1975  Date of evaluation: 5/24/2025  Provider: Liseth Tracy PA-C  PCP: José Luis Izquierdo MD    RUSTY. I have evaluated this patient.        Triage CHIEF COMPLAINT       Chief Complaint   Patient presents with    Shortness of Breath    Nausea    Pharyngitis         HISTORY OF PRESENT ILLNESS      Chief Complaint: Cough, congestion, sore throat, nausea    Yovanny Levine is a 50 y.o. male who presents for the above.  Symptoms began 2 days ago.  He denies any known fever because he does not have a thermometer but has had chills.  + chest pain and SOB secondary to coughing.  He has had nausea but no vomiting, no diarrhea.  He is a dialysis patient, MWF--was able to go yesterday.     Nursing Notes were all reviewed and agreed with or any disagreements were addressed in the HPI.    REVIEW OF SYSTEMS     CONSTITUTIONAL:  Denies fever.  + chills.    EYES:  Denies visual changes.  HEAD:  Denies headache.  ENT:  + nasal congestion, sore throat.  NECK:  Denies neck pain.  RESPIRATORY:  + cough, SOB.  CARDIOVASCULAR:  + chest pain.  GI:  Denies nausea or vomiting.    :  Denies urinary symptoms.  MUSCULOSKELETAL:  Denies extremity pain or swelling.  BACK:  Denies back pain.  INTEGUMENT:  Denies skin changes.  LYMPHATIC:  Denies lymphadenopathy.  NEUROLOGIC:  Denies any numbness/tingling.  PSYCHIATRIC:  Denies SI/HI.    PAST MEDICAL HISTORY     Past Medical History:   Diagnosis Date    Abscess 2010    scrotal    Acute renal failure (ARF) 08/04/2019    Anxiety associated with depression     Back pain 07/02/2012    CAD (coronary artery disease) 02/10/2023    Chest pain 05/01/2013    Diabetic neuropathy ([facility]) 12/22/2011    Diabetic ulcer of left midfoot associated with type 2 diabetes mellitus, with fat layer exposed ([facility]) 07/18/2017    Diverticulosis     C scope + Dr. Medina    DM (diabetes mellitus), type 2 ([facility]) 2002      BIOPSY performed by Jose Maria Cornelius MD at [facility] ENDOSCOPY    UPPER GASTROINTESTINAL ENDOSCOPY N/A 07/26/2022    EGD DIAGNOSTIC ONLY performed by Jose Maria Cornelius MD at [facility] ENDOSCOPY    UPPER GASTROINTESTINAL ENDOSCOPY N/A 9/4/2023    EGD BIOPSY performed by Mia AMBRIZ MD at [facility] ENDOSCOPY       CURRENTMEDICATIONS       Current Discharge Medication List        CONTINUE these medications which have NOT CHANGED    Details   oxyCODONE-acetaminophen (PERCOCET) 7.5-325 MG per tablet Take 1 tablet by mouth every 8 hours as needed for Pain for up to 30 days. Intended supply: 30 days Max Daily Amount: 3 tablets  Qty: 90 tablet, Refills: 0    Comments: Reduce doses taken as pain becomes manageable  Associated Diagnoses: Chronic pain syndrome      gentamicin (GARAMYCIN) 0.1 % ointment Apply topically daily Apply 1 gm topically with each dressing change, daily and prn.  Qty: 30 g, Refills: 1      furosemide (LASIX) 40 MG tablet Take 1 tablet by mouth daily  Qty: 30 tablet, Refills: 1      insulin glargine (LANTUS SOLOSTAR) 100 UNIT/ML injection pen Inject 15 Units into the skin nightly  Qty: 4.5 mL, Refills: 0      budesonide-formoterol (SYMBICORT) 160-4.5 MCG/ACT AERO Inhale 2 puffs into the lungs in the morning and 2 puffs in the evening.  Qty: 10.2 g, Refills: 3      tiotropium (SPIRIVA RESPIMAT) 2.5 MCG/ACT AERS inhaler Inhale 2 puffs into the lungs daily  Qty: 150 mcg, Refills: 0      metoprolol succinate (TOPROL XL) 25 MG extended release tablet Take 1 tablet by mouth daily      empagliflozin (JARDIANCE) 10 MG tablet Take 1 tablet by mouth daily  Qty: 30 tablet, Refills: 3      hydrALAZINE (APRESOLINE) 50 MG tablet Take 0.5 tablets by mouth in the morning and at bedtime  Qty: 60 tablet, Refills: 0      rOPINIRole (REQUIP) 0.25 MG tablet Take 1 tablet by mouth nightly  Qty: 90 tablet, Refills: 3      pantoprazole (PROTONIX) 40 MG tablet Take 1 tablet by mouth daily  Qty: 30 tablet, Refills: 0      isosorbide mononitrate  children: None    Years of education: None    Highest education level: None   Tobacco Use    Smoking status: Former     Current packs/day: 0.00     Types: Cigarettes     Start date: 2002     Quit date: 2022     Years since quittin.8     Passive exposure: Never    Smokeless tobacco: Never    Tobacco comments:     Smokes when drinking/social   Vaping Use    Vaping status: Never Used   Substance and Sexual Activity    Alcohol use: Not Currently     Comment: occ    Drug use: Yes     Types: Marijuana (Weed)     Comment: every couple days    Sexual activity: Yes     Partners: Female     Social Drivers of Health     Food Insecurity: No Food Insecurity (3/18/2025)    Hunger Vital Sign     Worried About Running Out of Food in the Last Year: Never true     Ran Out of Food in the Last Year: Never true   Transportation Needs: Unmet Transportation Needs (3/18/2025)    PRAPARE - Transportation     Lack of Transportation (Medical): Yes     Lack of Transportation (Non-Medical): Yes   Housing Stability: Low Risk  (3/18/2025)    Housing Stability Vital Sign     Unable to Pay for Housing in the Last Year: No     Number of Times Moved in the Last Year: 1     Homeless in the Last Year: No       SCREENINGS    Amber Coma Scale  Eye Opening: Spontaneous  Best Verbal Response: Oriented  Best Motor Response: Obeys commands  Rosanna Coma Scale Score: 15      PHYSICAL EXAM       ED Triage Vitals   BP Systolic BP Percentile Diastolic BP Percentile Temp Temp src Pulse Respirations SpO2   25 -- -- -- -- 25 2100   (!) 185/107     (!) 104 18 (!) 84 %      Height Weight - Scale         25         1.753 m (5' 9\") 113.4 kg (250 lb)            GENERAL APPEARANCE:  Well-developed, well-nourished  HEAD:  NC/AT.  EYES:  Sclera anicteric.   ENT:  Ears, nose, mouth normal.     NECK:  Supple.  CARDIO:  RRR.  LUNGS:   Wheezing.  Respirations mildly labored.  ABDOMEN:  Soft,  (0 mg IntraVENous Stopped 5/24/25 8194)   vancomycin (VANCOCIN) 2500 mg in sodium chloride 0.9 % 500 mL IVPB (0 mg IntraVENous Stopped 5/25/25 0010)   diphenhydrAMINE (BENADRYL) injection 50 mg (50 mg IntraVENous Given 5/25/25 0017)   oxyCODONE-acetaminophen (PERCOCET) 7.5-325 MG per tablet 1 tablet (1 tablet Oral Given 5/25/25 0031)       Independent Imaging Interpretation by me:  none  I did visualize imaging studies as noted above, but interpretation performed by radiologist.      EKG (if obtained):  Please see supervising physician's note for interpretation.    Chronic conditions affecting care:   Past Medical History:   Diagnosis Date    Abscess 2010    scrotal    Acute renal failure (ARF) 08/04/2019    Anxiety associated with depression     Back pain 07/02/2012    CAD (coronary artery disease) 02/10/2023    Chest pain 05/01/2013    Diabetic neuropathy ([facility]) 12/22/2011    Diabetic ulcer of left midfoot associated with type 2 diabetes mellitus, with fat layer exposed ([facility]) 07/18/2017    Diverticulosis     C scope + Dr. Medina    DM (diabetes mellitus), type 2 ([facility]) 2002    DR. 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 Medina    Nausea and vomiting 01/11/2019    Necrotizing fasciitis ([facility])     Nose fracture 1988    Panic attacks     Pericarditis 2003    Hospitalized with s/p heart cath normal    Peripheral autonomic neuropathy due to diabetes mellitus ([facility])     Axonal EMG- NCS, March 2011    Sebaceous cyst 09/01/2011    URI (upper respiratory infection) 02/27/2012    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 type 2 diabetes mellitus, with muscle involvement without evidence of

## 2025-05-25 NOTE — PLAN OF CARE
Problem: Chronic Conditions and Co-morbidities  Goal: Patient's chronic conditions and co-morbidity symptoms are monitored and maintained or improved  5/25/2025 1751 by Janelle Krueger RN  Outcome: Progressing  Flowsheets (Taken 5/25/2025 1125)  Care Plan - Patient's Chronic Conditions and Co-Morbidity Symptoms are Monitored and Maintained or Improved:   Collaborate with multidisciplinary team to address chronic and comorbid conditions and prevent exacerbation or deterioration   Monitor and assess patient's chronic conditions and comorbid symptoms for stability, deterioration, or improvement   Update acute care plan with appropriate goals if chronic or comorbid symptoms are exacerbated and prevent overall improvement and discharge  5/25/2025 1025 by Terri Ashby, RN  Outcome: Progressing  5/25/2025 0757 by Terri Ashby RN  Outcome: Progressing     Problem: Discharge Planning  Goal: Discharge to home or other facility with appropriate resources  5/25/2025 1751 by Janelle Krueger, RN  Outcome: Progressing  Flowsheets (Taken 5/25/2025 1125)  Discharge to home or other facility with appropriate resources:   Identify barriers to discharge with patient and caregiver   Arrange for needed discharge resources and transportation as appropriate   Identify discharge learning needs (meds, wound care, etc)   Arrange for interpreters to assist at discharge as needed   Refer to discharge planning if patient needs post-hospital services based on physician order or complex needs related to functional status, cognitive ability or social support system  5/25/2025 1025 by Terri Ashby, RN  Outcome: Progressing  5/25/2025 0757 by Terri Ashby RN  Outcome: Progressing     Problem: Pain  Goal: Verbalizes/displays adequate comfort level or baseline comfort level  5/25/2025 1751 by Janelle Krueger, RN  Outcome: Progressing  Flowsheets (Taken 5/25/2025 1125)  Verbalizes/displays adequate comfort level or baseline  comfort level:   Encourage patient to monitor pain and request assistance   Assess pain using appropriate pain scale   Administer analgesics based on type and severity of pain and evaluate response   Implement non-pharmacological measures as appropriate and evaluate response   Consider cultural and social influences on pain and pain management   Notify Licensed Independent Practitioner if interventions unsuccessful or patient reports new pain  5/25/2025 1025 by Terri Ashby RN  Outcome: Progressing  5/25/2025 0757 by Terri Ashby RN  Outcome: Progressing     Problem: Safety - Adult  Goal: Free from fall injury  5/25/2025 1751 by Janelle Krueger RN  Outcome: Progressing  5/25/2025 1025 by Terri Ashby RN  Outcome: Progressing  5/25/2025 0757 by Terri Ashby RN  Outcome: Progressing     Problem: Respiratory - Adult  Goal: Achieves optimal ventilation and oxygenation  5/25/2025 1751 by Janelle Krueger RN  Outcome: Progressing  Flowsheets (Taken 5/25/2025 1125)  Achieves optimal ventilation and oxygenation:   Assess for changes in respiratory status   Assess for changes in mentation and behavior   Position to facilitate oxygenation and minimize respiratory effort   Oxygen supplementation based on oxygen saturation or arterial blood gases   Initiate smoking cessation protocol as indicated   Encourage broncho-pulmonary hygiene including cough, deep breathe, incentive spirometry   Assess the need for suctioning and aspirate as needed   Assess and instruct to report shortness of breath or any respiratory difficulty   Respiratory therapy support as indicated  5/25/2025 1025 by Terri Ashby RN  Outcome: Progressing  5/25/2025 0757 by Terri Ashby RN  Outcome: Progressing

## 2025-05-25 NOTE — H&P
History and Physical      Name:  Yovanny Levine /Age/Sex: 1975  (50 y.o. male)   MRN & CSN:  [id number] & [id number] Encounter Date/Time: 2025 1:02 AM EDT   Location:  ED31/ED-31 PCP: José Luis Izquierdo MD       Hospital Day: 2    Assessment and Plan:     #.  Acute on chronic respiratory failure with hypoxia  - Patient is on oxygen at 3 L/min at night  - Was saturating 84% on room and on antiviral  - Chest x-ray-bibasilar patchy opacity-correlate for atelectasis/edema or infiltrate  - VBG-pH 7.438, PCO2 40.7, PO2 43.1, HCO3 26.9  -Patient reports being compliant with his dialysis, at his dry weight  - Monitor and wean off oxygen as tolerated    #.  Patient meets SIRS/sepsis criteria  - WBC 11.2, tachycardic on presentation  - Lactic acid 2, procalcitonin 1.20  -Blood cultures done in ED  - Patient received vancomycin, cefepime in ER  - Developed itching after vancomycin  - Continue linezolid, cefepime    #.  Parainfluenza infection  - Treating for possible bronchitis  - Continue p.o. prednisone, DuoNeb    #.  Recent admission to [facility] 3/20-3/22/2025  - Patient was diagnosed with influenza A, was also treated for hypoxic respiratory failure secondary to hypervolemia  - Patient was treated with Tamiflu  -Patient also had LHC was noted to have patent LAD stent, OM stent 100% occluded, did not need any intervention  - Patient was discharged on Coreg-6.25 mg twice daily    #.  Coronary artery disease  -Status post PCI and stent to proximal LAD-2024  -Patient is on aspirin, Plavix, atorvastatin, metoprolol succinate, IMN     #.  History of ventricular tachycardia-2024     #.  Chronic left foot osteomyelitis  -Patient states that his wounds are well-healed and was discharged from wound care  -Patient currently has healing wounds on his abdomen, scalp which appears healing well    #.  Hypertension  -on hydralazine, metoprolol succinate-reports taking  - Last prescription for hydralazine was    chlorthalidone (HYGROTON) 25 MG tablet Take 1 tablet by mouth daily 6/15/21 8/3/22  José Luis Izquierdo MD   linagliptin (TRADJENTA) 5 MG tablet Take 1 tablet by mouth daily 3/29/21 8/3/22  José Luis Izquierdo MD   Lancets MISC 1 each by Does not apply route daily 9/21/18   Esther Vela MD   glucose monitoring kit (FREESTYLE) monitoring kit 1 each by Does not apply route once for 1 dose. 6/20/13 6/20/13  Kenia Rodgers MD       Physical Exam: Need 8 Elements   Physical Exam     GEN  -Awake, alert, NAD, sitting at the edge of bed.   EYES   -PERRL.   HENT  -MM are moist.   RESP  -LS CTA equal bilat, no wheezes, rales or rhonchi. Symmetric chest movement. No respiratory distress noted.  C/V  -S1/S2 auscultated. RRR without appreciable M/R/G.  Bibasilar crackles  GI  -Abdomen is soft, non-distended, no significant tenderness. No masses or guarding. + BS in all quadrants. Rectal exam deferred.     -No CVA tenderness. Luna catheter is not present.  MS  -B/L extremities -No gross joint deformities. No swelling, intact sensation symmetrical.   SKIN  -Normal coloration, warm, dry.   NEURO  - Awake, alert, oriented x 3, no focal deficits.  PSYC  - Appropriate affect.       Past Medical History:   Reviewed patient's past medical, surgical, social, family history and allergies.      PMHx   Past Medical History:   Diagnosis Date   • Abscess 2010    scrotal   • Acute renal failure (ARF) 08/04/2019   • Anxiety associated with depression    • Back pain 07/02/2012   • CAD (coronary artery disease) 02/10/2023   • Chest pain 05/01/2013   • Diabetic neuropathy (HCC) 12/22/2011   • Diabetic ulcer of left midfoot associated with type 2 diabetes mellitus, with fat layer exposed ([facility]) 07/18/2017   • Diverticulosis     C scope + Dr. Medina   • DM (diabetes mellitus), type 2 ([facility]) 2002    DR. 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

## 2025-05-25 NOTE — PROGRESS NOTES
Dr. Conn notified of  deterioration index score of >50. Dr. James has already seen pt this am and has placed orders regarding labs. /103, am BP meds to be given. Pt also in pain and pain med given. Pt is stable. Response, no orders.

## 2025-05-25 NOTE — ED NOTES
Called to patient room. Patient reports becoming hot and flushed after initiation of vancomycin. Provider made aware of reaction. ATB stopped at this time. Vancomycin added to patient list of allergies at this time.

## 2025-05-25 NOTE — PROGRESS NOTES
LOVENOX PROPHYLAXIS EVALUATION  (Populations not addressed in this protocol: trauma, obstetrics, or COVID-19)    Wt Readings from Last 3 Encounters:   05/24/25 113.4 kg (250 lb)   03/07/25 116.3 kg (256 lb 6.4 oz)   02/24/25 125.5 kg (276 lb 10.8 oz)       Estimated Creatinine Clearance: 15 mL/min (A) (based on SCr of 7.4 mg/dL (H)).  Recent Labs     05/24/25 2056   BUN 61*   CREATININE 7.4*      HGB 10.3*   HCT 31.4*       Weight Range: 101-150.9 kg    CRCL <15 or dialysis    []  50.9 kg   and below     []  .9  kg   [x]  101-150.9 kg   []  151-174.9  kg   []  175 kg  or greater     Heparin 5,000 units  subq BID     Heparin 5,000 units  subq TID       Heparin 5,000 units  subq TID   Heparin 5,000 units  subq TID   Heparin 7,500 units  subq TID       Per P/T protocol for appropriate subq anticoagulation by weight and CRCL change to:    Heparin 5,000 units subq TID      Yefri Cazares RPH  2:27 AM  05/25/25

## 2025-05-26 VITALS
RESPIRATION RATE: 21 BRPM | TEMPERATURE: 98.2 F | BODY MASS INDEX: 37.03 KG/M2 | OXYGEN SATURATION: 96 % | WEIGHT: 250 LBS | HEIGHT: 69 IN | DIASTOLIC BLOOD PRESSURE: 90 MMHG | SYSTOLIC BLOOD PRESSURE: 135 MMHG | HEART RATE: 85 BPM

## 2025-05-26 LAB
ANION GAP SERPL CALCULATED.3IONS-SCNC: 26 MMOL/L (ref 9–17)
BUN SERPL-MCNC: 104 MG/DL (ref 7–20)
CALCIUM SERPL-MCNC: 8.9 MG/DL (ref 8.3–10.6)
CHLORIDE SERPL-SCNC: 85 MMOL/L (ref 99–110)
CO2 SERPL-SCNC: 24 MMOL/L (ref 21–32)
CREAT SERPL-MCNC: 9.8 MG/DL (ref 0.9–1.3)
EKG ATRIAL RATE: 100 BPM
EKG DIAGNOSIS: NORMAL
EKG P AXIS: 41 DEGREES
EKG P-R INTERVAL: 172 MS
EKG Q-T INTERVAL: 366 MS
EKG QRS DURATION: 94 MS
EKG QTC CALCULATION (BAZETT): 472 MS
EKG R AXIS: 53 DEGREES
EKG T AXIS: 91 DEGREES
EKG VENTRICULAR RATE: 100 BPM
GFR, ESTIMATED: 6 ML/MIN/1.73M2
GLUCOSE BLD-MCNC: 207 MG/DL (ref 74–99)
GLUCOSE BLD-MCNC: 220 MG/DL (ref 74–99)
GLUCOSE SERPL-MCNC: 198 MG/DL (ref 74–99)
MAGNESIUM SERPL-MCNC: 2.8 MG/DL (ref 1.8–2.4)
POTASSIUM SERPL-SCNC: 4.7 MMOL/L (ref 3.5–5.1)
SODIUM SERPL-SCNC: 134 MMOL/L (ref 136–145)

## 2025-05-26 PROCEDURE — 6360000002 HC RX W HCPCS: Performed by: INTERNAL MEDICINE

## 2025-05-26 PROCEDURE — 94761 N-INVAS EAR/PLS OXIMETRY MLT: CPT

## 2025-05-26 PROCEDURE — 82962 GLUCOSE BLOOD TEST: CPT

## 2025-05-26 PROCEDURE — 6360000002 HC RX W HCPCS: Performed by: NURSE PRACTITIONER

## 2025-05-26 PROCEDURE — 6370000000 HC RX 637 (ALT 250 FOR IP): Performed by: INTERNAL MEDICINE

## 2025-05-26 PROCEDURE — 83735 ASSAY OF MAGNESIUM: CPT

## 2025-05-26 PROCEDURE — 2700000000 HC OXYGEN THERAPY PER DAY

## 2025-05-26 PROCEDURE — 93010 ELECTROCARDIOGRAM REPORT: CPT | Performed by: INTERNAL MEDICINE

## 2025-05-26 PROCEDURE — 90935 HEMODIALYSIS ONE EVALUATION: CPT

## 2025-05-26 PROCEDURE — 36415 COLL VENOUS BLD VENIPUNCTURE: CPT

## 2025-05-26 PROCEDURE — 80048 BASIC METABOLIC PNL TOTAL CA: CPT

## 2025-05-26 PROCEDURE — 6370000000 HC RX 637 (ALT 250 FOR IP): Performed by: NURSE PRACTITIONER

## 2025-05-26 RX ORDER — DIPHENHYDRAMINE HCL 25 MG
25 TABLET ORAL ONCE
Status: COMPLETED | OUTPATIENT
Start: 2025-05-26 | End: 2025-05-26

## 2025-05-26 RX ORDER — PROCHLORPERAZINE EDISYLATE 5 MG/ML
10 INJECTION INTRAMUSCULAR; INTRAVENOUS ONCE
Status: COMPLETED | OUTPATIENT
Start: 2025-05-26 | End: 2025-05-26

## 2025-05-26 RX ORDER — AMOXICILLIN AND CLAVULANATE POTASSIUM 500; 125 MG/1; MG/1
1 TABLET, FILM COATED ORAL 2 TIMES DAILY
Qty: 14 TABLET | Refills: 0 | Status: SHIPPED | OUTPATIENT
Start: 2025-05-26 | End: 2025-06-02

## 2025-05-26 RX ORDER — PREDNISONE 20 MG/1
40 TABLET ORAL DAILY
Qty: 8 TABLET | Refills: 0 | Status: SHIPPED | OUTPATIENT
Start: 2025-05-26 | End: 2025-05-30

## 2025-05-26 RX ADMIN — EPOETIN ALFA-EPBX 10000 UNITS: 10000 INJECTION, SOLUTION INTRAVENOUS; SUBCUTANEOUS at 10:56

## 2025-05-26 RX ADMIN — HEPARIN SODIUM 5000 UNITS: 5000 INJECTION INTRAVENOUS; SUBCUTANEOUS at 05:53

## 2025-05-26 RX ADMIN — OXYCODONE HYDROCHLORIDE AND ACETAMINOPHEN 1 TABLET: 7.5; 325 TABLET ORAL at 01:18

## 2025-05-26 RX ADMIN — LINEZOLID 600 MG: 600 TABLET, FILM COATED ORAL at 13:23

## 2025-05-26 RX ADMIN — ISOSORBIDE MONONITRATE 15 MG: 30 TABLET, EXTENDED RELEASE ORAL at 13:23

## 2025-05-26 RX ADMIN — ONDANSETRON 4 MG: 2 INJECTION, SOLUTION INTRAMUSCULAR; INTRAVENOUS at 01:20

## 2025-05-26 RX ADMIN — HEPARIN SODIUM 5000 UNITS: 5000 INJECTION INTRAVENOUS; SUBCUTANEOUS at 13:16

## 2025-05-26 RX ADMIN — PROCHLORPERAZINE EDISYLATE 10 MG: 5 INJECTION INTRAMUSCULAR; INTRAVENOUS at 04:11

## 2025-05-26 RX ADMIN — CALCITRIOL CAPSULES 0.25 MCG 0.5 MCG: 0.25 CAPSULE ORAL at 13:17

## 2025-05-26 RX ADMIN — CLOPIDOGREL BISULFATE 75 MG: 75 TABLET, FILM COATED ORAL at 13:17

## 2025-05-26 RX ADMIN — INSULIN LISPRO 2 UNITS: 100 INJECTION, SOLUTION INTRAVENOUS; SUBCUTANEOUS at 13:22

## 2025-05-26 RX ADMIN — DIPHENHYDRAMINE HYDROCHLORIDE 25 MG: 25 TABLET ORAL at 02:09

## 2025-05-26 RX ADMIN — ASPIRIN 81 MG: 81 TABLET, CHEWABLE ORAL at 13:16

## 2025-05-26 RX ADMIN — HYDRALAZINE HYDROCHLORIDE 25 MG: 25 TABLET ORAL at 13:17

## 2025-05-26 RX ADMIN — METOPROLOL SUCCINATE 25 MG: 25 TABLET, EXTENDED RELEASE ORAL at 13:22

## 2025-05-26 RX ADMIN — PREDNISONE 40 MG: 20 TABLET ORAL at 13:17

## 2025-05-26 RX ADMIN — OXYCODONE HYDROCHLORIDE AND ACETAMINOPHEN 1 TABLET: 7.5; 325 TABLET ORAL at 13:23

## 2025-05-26 RX ADMIN — FUROSEMIDE 40 MG: 10 INJECTION, SOLUTION INTRAMUSCULAR; INTRAVENOUS at 13:16

## 2025-05-26 ASSESSMENT — PAIN SCALES - GENERAL
PAINLEVEL_OUTOF10: 7
PAINLEVEL_OUTOF10: 3

## 2025-05-26 ASSESSMENT — PAIN DESCRIPTION - LOCATION
LOCATION: BACK;FOOT
LOCATION: BACK

## 2025-05-26 ASSESSMENT — PAIN DESCRIPTION - ORIENTATION
ORIENTATION: RIGHT;LEFT;LOWER
ORIENTATION: LOWER

## 2025-05-26 ASSESSMENT — PAIN DESCRIPTION - DESCRIPTORS
DESCRIPTORS: ACHING
DESCRIPTORS: ACHING

## 2025-05-26 NOTE — PROGRESS NOTES
0730 Pt alert  Oriented x4. went to HD with oxygen on. Night nurse assisted with ticket to ride and oxygen. No c/o voiced when rounded at bed side hand off

## 2025-05-26 NOTE — PLAN OF CARE
Problem: Chronic Conditions and Co-morbidities  Goal: Patient's chronic conditions and co-morbidity symptoms are monitored and maintained or improved  5/26/2025 0457 by Mayo Grover RN  Outcome: Progressing  5/25/2025 1751 by Janelle Krueger RN  Outcome: Progressing  Flowsheets (Taken 5/25/2025 1125)  Care Plan - Patient's Chronic Conditions and Co-Morbidity Symptoms are Monitored and Maintained or Improved:   Collaborate with multidisciplinary team to address chronic and comorbid conditions and prevent exacerbation or deterioration   Monitor and assess patient's chronic conditions and comorbid symptoms for stability, deterioration, or improvement   Update acute care plan with appropriate goals if chronic or comorbid symptoms are exacerbated and prevent overall improvement and discharge     Problem: Discharge Planning  Goal: Discharge to home or other facility with appropriate resources  5/26/2025 0457 by Mayo Grover RN  Outcome: Progressing  5/25/2025 1751 by Janelle Krueger RN  Outcome: Progressing  Flowsheets (Taken 5/25/2025 1125)  Discharge to home or other facility with appropriate resources:   Identify barriers to discharge with patient and caregiver   Arrange for needed discharge resources and transportation as appropriate   Identify discharge learning needs (meds, wound care, etc)   Arrange for interpreters to assist at discharge as needed   Refer to discharge planning if patient needs post-hospital services based on physician order or complex needs related to functional status, cognitive ability or social support system     Problem: Pain  Goal: Verbalizes/displays adequate comfort level or baseline comfort level  5/26/2025 0457 by Mayo Grover RN  Outcome: Progressing  5/25/2025 1751 by Janelle Krueger RN  Outcome: Progressing  Flowsheets (Taken 5/25/2025 1125)  Verbalizes/displays adequate comfort level or baseline comfort level:   Encourage patient to monitor pain and request  assistance   Assess pain using appropriate pain scale   Administer analgesics based on type and severity of pain and evaluate response   Implement non-pharmacological measures as appropriate and evaluate response   Consider cultural and social influences on pain and pain management   Notify Licensed Independent Practitioner if interventions unsuccessful or patient reports new pain     Problem: Safety - Adult  Goal: Free from fall injury  5/26/2025 0457 by Mayo Grover RN  Outcome: Progressing  5/25/2025 1751 by Janelle Krueger RN  Outcome: Progressing     Problem: Respiratory - Adult  Goal: Achieves optimal ventilation and oxygenation  5/26/2025 0457 by aMyo Grover RN  Outcome: Progressing  5/25/2025 1751 by Janelle Krueger RN  Outcome: Progressing  Flowsheets (Taken 5/25/2025 1125)  Achieves optimal ventilation and oxygenation:   Assess for changes in respiratory status   Assess for changes in mentation and behavior   Position to facilitate oxygenation and minimize respiratory effort   Oxygen supplementation based on oxygen saturation or arterial blood gases   Initiate smoking cessation protocol as indicated   Encourage broncho-pulmonary hygiene including cough, deep breathe, incentive spirometry   Assess the need for suctioning and aspirate as needed   Assess and instruct to report shortness of breath or any respiratory difficulty   Respiratory therapy support as indicated

## 2025-05-26 NOTE — DISCHARGE SUMMARY
V2.0  Discharge Summary    Name:  Yovanny Levine /Age/Sex: 1975 (50 y.o. male)   Admit Date: 2025  Discharge Date: 25    MRN & CSN:  [id number] & [id number] Encounter Date and Time 25 12:33 PM EDT    Attending:  Andrae Conn MD Discharging Provider: Andrae Conn MD       Hospital Course:     Brief HPI and Hospital course: Yovanny Levine is a 50 y.o. male who presented with worsening shortness of breath, found to be in acute on chronic respiratory failure secondary to parainfluenza, chest x-ray bibasilar opacities, VBG showed pCO2 40.7, patient was positive for parainfluenza infection, patient got dialysis and patient's symptoms improved once patient's symptoms improved discharge patient in a stable condition to follow-up with PCP, nephrology.        The patient expressed appropriate understanding of, and agreement with the discharge recommendations, medications, and plan.     Consults this admission:  IP CONSULT TO NEPHROLOGY    Discharge Diagnosis:   Acute on chronic hypoxic respiratory failure (HCC)  Parainfluenza virus    Discharge Instruction:   Follow up appointments: PCP and nephrology  Primary care physician: José Luis Izquierdo MD within 1 week  Diet: regular diet and renal diet   Activity: activity as tolerated  Disposition: Discharged to:   [x]Home, [][facility], []SNF, []Acute Rehab, []Hospice   Condition on discharge: Stable  Labs and Tests to be Followed up as an outpatient by PCP or Specialist:     Discharge Medications:        Medication List        START taking these medications      amoxicillin-clavulanate 500-125 MG per tablet  Commonly known as: Augmentin  Take 1 tablet by mouth 2 times daily for 7 days     predniSONE 20 MG tablet  Commonly known as: DELTASONE  Take 2 tablets by mouth daily for 4 doses            CONTINUE taking these medications      albuterol sulfate  (90 Base) MCG/ACT inhaler  Commonly known as: Ventolin HFA  Inhale 2 puffs into the      05/24/25 2056 05/25/25 0352   WBC 11.2* 15.2*   HGB 10.3* 9.8*    196     BMP:    Recent Labs     05/24/25 2056 05/25/25 0352 05/26/25 0540    130* 134*   K 5.2* 5.6* 4.7   CL 89* 88* 85*   CO2 26 22 24   BUN 61* 69* 104*   CREATININE 7.4* 7.7* 9.8*   GLUCOSE 182* 295* 198*     Hepatic:   Recent Labs     05/24/25 2056   AST 19   ALT 12   BILITOT 0.6   ALKPHOS 84     Lipids:   Lab Results   Component Value Date/Time    CHOL 84 02/18/2025 04:35 AM    HDL 30 02/18/2025 04:35 AM    TRIG 57 02/18/2025 04:35 AM     Hemoglobin A1C:   Lab Results   Component Value Date/Time    LABA1C 5.8 03/05/2025 09:40 AM     TSH:   Lab Results   Component Value Date/Time    TSH 1.64 02/17/2025 06:30 PM     Troponin:   Lab Results   Component Value Date/Time    TROPONINT 0.176 02/08/2023 05:45 AM    TROPONINT 0.183 02/07/2023 09:17 PM    TROPONINT 0.155 09/13/2022 11:49 AM     Lactic Acid: No results for input(s): \"LACTA\" in the last 72 hours.  BNP:   Recent Labs     05/25/25 0352   PROBNP >70,000*     UA:  Lab Results   Component Value Date/Time    NITRU NEGATIVE 01/07/2025 01:21 AM    COLORU Yellow 01/07/2025 01:21 AM    PHUR 6.5 01/07/2025 01:21 AM    PHUR 6.5 06/20/2013 09:51 AM    WBCUA 2 01/07/2025 01:21 AM    RBCUA 1 01/07/2025 01:21 AM    RBCUA 1 08/28/2024 03:09 AM    MUCUS RARE 07/24/2022 11:40 PM    TRICHOMONAS NONE SEEN 08/28/2024 03:09 AM    BACTERIA RARE 01/07/2025 01:21 AM    CLARITYU CLEAR 08/28/2024 03:09 AM    SPECGRAV 1.025 06/20/2013 09:51 AM    LEUKOCYTESUR NEGATIVE 01/07/2025 01:21 AM    UROBILINOGEN 0.2 01/07/2025 01:21 AM    BILIRUBINUR NEGATIVE 01/07/2025 01:21 AM    BLOODU TRACE 08/28/2024 03:09 AM    GLUCOSEU 250 01/07/2025 01:21 AM    KETUA NEGATIVE 01/07/2025 01:21 AM    AMORPHOUS RARE 07/24/2022 11:40 PM     Urine Cultures: No results found for: \"LABURIN\"  Blood Cultures: No results found for: \"BC\"  No results found for: \"BLOODCULT2\"  Organism:   Lab Results   Component Value Date/Time

## 2025-05-26 NOTE — PROGRESS NOTES
Pt tolerated 4hr tx poor. AVF accessed without issue. Pt c/o severe cramping throughout tx resulting in UF goal to be lowered. Dr. James notified and new order received to reduce tx time to 3.5hr. Pt clotted at the end of tx therefore blood was unable to be rinse back. Report was given to Moni KELLY.      Patient Name: Yovanny Levine  Patient : 1975  MRN: [id number]     Acct: [id number]  Date of Admission: 2025  Room/Bed: 3015/3015-A  Code Status:  Full Code  Allergies:   Allergies   Allergen Reactions    Adhesive Tape Rash    Doxycycline Nausea And Vomiting    Reglan [Metoclopramide] Anxiety    Vancomycin Itching     Diagnosis:    Patient Active Problem List   Diagnosis    Hiatal hernia    Diabetes mellitus type 2, improved controlled    Diabetic neuropathy (HCC)    Panic attack    Anxiety associated with depression    Neck pain    DANIELA (obstructive sleep apnea)    Urinary frequency    Bilateral carpal tunnel syndrome- left worse than right    Hyperlipidemia with target LDL less than 100    Essential hypertension    Bronchitis    Osteomyelitis (HCC)    Abscess    Periumbilical hernia    Sepsis (HCC)    Cellulitis of left foot    Constipation    Intractable nausea and vomiting    Nausea and vomiting    Acute renal failure (ARF)    Cellulitis    Cellulitis of left arm    Cellulitis, scrotum    Acute epididymitis    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HCC)    Recurrent major depressive disorder, in full remission    Generalized anxiety disorder    Necrotizing fasciitis (HCC)    Syncope    Right groin wound    Nephrotic syndrome    Generalized edema    Stage 3b chronic kidney disease (HCC)    Diabetic nephropathy associated with type 2 diabetes mellitus (HCC)    Hypoalbuminemia    Chronic kidney disease, stage IV (severe) (HCC)    FH: CAD (coronary artery disease)    ASCVD (arteriosclerotic cardiovascular disease)    Other proteinuria    Chest pain    CARMEN (acute kidney injury)    Anemia    Chest tightness    NSTEMI (non-ST  elevated myocardial infarction) (HCC)    Diabetic foot ulcer with osteomyelitis (HCC)    ESRD (end stage renal disease) (HCC)    Problem with vascular access    Acute on chronic respiratory failure with hypoxia (HCC)    CAD (coronary artery disease)    Volume overload    Gangrene (HCC)    Acute osteomyelitis of metatarsal bone of left foot (HCC)    Open wound of plantar aspect of left foot    Open wound of foot, left, sequela    Chronic foot pain, left    Absence of toe of left foot    Hyperkalemia    ESRD (end stage renal disease) on dialysis (HCC)    Diabetic ulcer of midfoot Dave 3 associated with type 2 diabetes mellitus, with muscle involvement without evidence of necrosis (HCC)    Acute pain of left knee    Pain in left lower leg    Chronic refractory osteomyelitis of foot, left (HCC)    Acute hypoxic respiratory failure (HCC)    Type 2 diabetes mellitus with skin complication, with long-term current use of insulin (HCC)    Acute on chronic respiratory failure (HCC)    Diabetic ulcer of right midfoot associated with type 2 diabetes mellitus, with fat layer exposed (HCC)    Long toenail    Hypervolemia associated with renal insufficiency    Fluid overload    Recurrent right pleural effusion    Pre-ulcerative calluses    Abscess of foot    Cellulitis of right foot    Heart failure (HCC)    Diabetic foot infection (HCC)    Hypervolemia    Ulcer of abdomen wall with fat layer exposed (HCC)    Chronic pain syndrome    Burn of second degree of abdominal wall, subsequent encounter    Acute on chronic hypoxic respiratory failure (HCC)         Treatment:  Hemodilaysis 2:1  Priority: Routine  Location: Acute Room    Diabetic: Yes  NPO: No  Isolation Precautions: Airborne     Consent for Treatment Verified: Yes  Blood Consent Verified: Not Applicable     Safety Verified: Identify (I), Consent (C), Equipment (E), HepB Status (B), Orders Complete (O), Access Verified (A), and Timeliness (T)  Time out performed prior to  minutes  Machine Disinfection Process: Acid/Vinegar Clean, Heat Disinfect, Exterior Machine Disinfection  Rinseback Volume (ml): 300 ml  Blood Volume Processed (Liters): 75.2 L     Duration of Treatment (minutes): 210 minutes     Hemodialysis Intake (ml): 500 ml  Hemodialysis Output (ml): 2870 ml     Tolerated Treatment: Fair  Patient Response to Treatment: fair  Physician Notified: Yes       Provider Notification        Handoff complete and report given to Primary RN at 1155 hours.  Primary RN (First Initial, Last Name, Title):  Moni KELLY     Education  Person Educated: Patient   Knowledge Base: Substantial  Barriers to Learning?: None  Preferred method of Learning: Oral  Topic(s): Access Care, Signs and Symptoms of Infection, Fluid Management, Albumin, Procedural, Medications, Treatment Options, Potassium, Diet, and Transplant   Teaching Tools: Explanation   Response to Education: Verbalized Understanding and Requires Follow-up     Electronically signed by Lola Marx RN on 5/26/2025 at 11:58 AM

## 2025-05-26 NOTE — PROGRESS NOTES
Nephrology Progress Note        [address] [place] [address]  Phone: [phone]  Office Hours: 8:30AM - 4:30PM  Monday - Friday 5/26/2025 7:17 AM  Subjective:   Admit Date: 5/24/2025  PCP: José Luis Izquierdo MD  Interval History:   On NC  He wants to go home today as he has an appt with social security at 9am tomorrow    Diet: ADULT DIET; Regular; 4 carb choices (60 gm/meal); Low Fat/Low Chol/High Fiber/2 gm Na; Low Potassium (Less than 3000 mg/day); 1200 ml      Data:   Scheduled Meds:   sodium chloride flush  5-40 mL IntraVENous 2 times per day    furosemide  40 mg IntraVENous BID    insulin lispro  0-8 Units SubCUTAneous 4x Daily AC & HS    aspirin  81 mg Oral Daily    atorvastatin  40 mg Oral Nightly    budesonide-formoterol  2 puff Inhalation BID RT    calcitRIOL  0.5 mcg Oral BID    clopidogrel  75 mg Oral Daily    gentamicin   Topical Daily    hydrALAZINE  25 mg Oral BID    insulin glargine  15 Units SubCUTAneous Nightly    isosorbide mononitrate  15 mg Oral Daily    metoprolol succinate  25 mg Oral Daily    rOPINIRole  0.25 mg Oral Nightly    predniSONE  40 mg Oral Daily    linezolid  600 mg Oral 2 times per day    heparin (porcine)  5,000 Units SubCUTAneous 3 times per day    cefepime  1,000 mg IntraVENous Q24H    ipratropium 0.5 mg-albuterol 2.5 mg  1 Dose Inhalation Q4H WA RT    epoetin paola-epbx  10,000 Units IntraVENous Once per day on Monday Wednesday Friday     Continuous Infusions:   sodium chloride      dextrose      dextrose       PRN Meds:sodium chloride flush, sodium chloride, ondansetron **OR** ondansetron, polyethylene glycol, acetaminophen **OR** acetaminophen, glucose, dextrose bolus **OR** dextrose bolus, glucagon (rDNA), dextrose, albuterol sulfate HFA, oxyCODONE-acetaminophen, glucose, dextrose bolus **OR** dextrose bolus, glucagon (rDNA), dextrose  I/O last 3 completed shifts:  In: 670 [P.O.:420; I.V.:250]  Out: -   No intake/output data recorded.    Intake/Output

## 2025-05-27 ENCOUNTER — RESULTS FOLLOW-UP (OUTPATIENT)
Dept: EMERGENCY DEPT | Age: 50
End: 2025-05-27

## 2025-05-27 LAB — GLUCOSE BLD-MCNC: 316 MG/DL (ref 74–99)

## 2025-05-29 ENCOUNTER — HOSPITAL ENCOUNTER (OUTPATIENT)
Dept: WOUND CARE | Age: 50
Discharge: HOME | End: 2025-05-29
Attending: STUDENT IN AN ORGANIZED HEALTH CARE EDUCATION/TRAINING PROGRAM
Payer: MEDICARE

## 2025-05-29 VITALS
HEART RATE: 87 BPM | DIASTOLIC BLOOD PRESSURE: 67 MMHG | SYSTOLIC BLOOD PRESSURE: 172 MMHG | RESPIRATION RATE: 19 BRPM | TEMPERATURE: 97.5 F

## 2025-05-29 DIAGNOSIS — T21.22XD BURN OF SECOND DEGREE OF ABDOMINAL WALL, SUBSEQUENT ENCOUNTER: Primary | ICD-10-CM

## 2025-05-29 DIAGNOSIS — Z89.422: ICD-10-CM

## 2025-05-29 PROCEDURE — 16020 DRESS/DEBRID P-THICK BURN S: CPT

## 2025-05-29 ASSESSMENT — PAIN DESCRIPTION - FREQUENCY: FREQUENCY: CONTINUOUS

## 2025-05-29 ASSESSMENT — PAIN DESCRIPTION - ORIENTATION: ORIENTATION: MID

## 2025-05-29 ASSESSMENT — PAIN DESCRIPTION - DESCRIPTORS: DESCRIPTORS: ACHING

## 2025-05-29 ASSESSMENT — PAIN DESCRIPTION - LOCATION: LOCATION: ABDOMEN

## 2025-05-29 ASSESSMENT — PAIN DESCRIPTION - ONSET: ONSET: ON-GOING

## 2025-05-29 ASSESSMENT — PAIN SCALES - GENERAL: PAINLEVEL_OUTOF10: 5

## 2025-05-29 ASSESSMENT — PAIN - FUNCTIONAL ASSESSMENT: PAIN_FUNCTIONAL_ASSESSMENT: PREVENTS OR INTERFERES SOME ACTIVE ACTIVITIES AND ADLS

## 2025-05-29 ASSESSMENT — PAIN DESCRIPTION - PAIN TYPE: TYPE: CHRONIC PAIN;ACUTE PAIN

## 2025-05-29 NOTE — PATIENT INSTRUCTIONS
PHYSICIAN ORDERS AND DISCHARGE INSTRUCTIONS     Wound Care Notes:  Sees Dr Kumar.  surgical shoes with pegassist to offload right plantar wound 3/13/25                  Applied for Kerecis grafts on 24  Donated Kerecis applied to left lateral foot 24                             Orders for this week: 2025    Abdomen Burn : Wash with soap and water, pat dry.  Apply Betadine to wound bed   Cover with ca alginate and gentac w/ tegaderm  Change Daily       Plan: HBO workup started 9/10/24.   [facility] discharge due to not home bound 24.   Toenails 2025.        Follow Up Instructions: 1 weeks   Primary Wound Care Provider: Shelly Rubalcava CNP  Call  for any questions or concerns.  Central [phone] for imaging lab work

## 2025-05-30 LAB
MICROORGANISM SPEC CULT: NORMAL
MICROORGANISM SPEC CULT: NORMAL
SERVICE CMNT-IMP: NORMAL
SERVICE CMNT-IMP: NORMAL
SPECIMEN DESCRIPTION: NORMAL
SPECIMEN DESCRIPTION: NORMAL

## 2025-06-01 PROBLEM — L97.412 DIABETIC ULCER OF RIGHT MIDFOOT ASSOCIATED WITH TYPE 2 DIABETES MELLITUS, WITH FAT LAYER EXPOSED (HCC): Status: RESOLVED | Noted: 2024-12-30 | Resolved: 2025-06-01

## 2025-06-01 PROBLEM — E11.621 DIABETIC ULCER OF RIGHT MIDFOOT ASSOCIATED WITH TYPE 2 DIABETES MELLITUS, WITH FAT LAYER EXPOSED (HCC): Status: RESOLVED | Noted: 2024-12-30 | Resolved: 2025-06-01

## 2025-06-01 RX ORDER — BACITRACIN ZINC AND POLYMYXIN B SULFATE 500; 1000 [USP'U]/G; [USP'U]/G
OINTMENT TOPICAL ONCE
OUTPATIENT
Start: 2025-06-01 | End: 2025-06-01

## 2025-06-01 RX ORDER — MUPIROCIN 20 MG/G
OINTMENT TOPICAL ONCE
OUTPATIENT
Start: 2025-06-01 | End: 2025-06-01

## 2025-06-01 RX ORDER — LIDOCAINE HYDROCHLORIDE 20 MG/ML
JELLY TOPICAL ONCE
OUTPATIENT
Start: 2025-06-01 | End: 2025-06-01

## 2025-06-01 RX ORDER — CLOBETASOL PROPIONATE 0.5 MG/G
OINTMENT TOPICAL ONCE
OUTPATIENT
Start: 2025-06-01 | End: 2025-06-01

## 2025-06-01 RX ORDER — SODIUM CHLOR/HYPOCHLOROUS ACID 0.033 %
SOLUTION, IRRIGATION IRRIGATION ONCE
OUTPATIENT
Start: 2025-06-01 | End: 2025-06-01

## 2025-06-01 RX ORDER — LIDOCAINE HYDROCHLORIDE 40 MG/ML
SOLUTION TOPICAL ONCE
OUTPATIENT
Start: 2025-06-01 | End: 2025-06-01

## 2025-06-01 RX ORDER — TRIAMCINOLONE ACETONIDE 1 MG/G
OINTMENT TOPICAL ONCE
OUTPATIENT
Start: 2025-06-01 | End: 2025-06-01

## 2025-06-01 RX ORDER — LIDOCAINE 40 MG/G
CREAM TOPICAL ONCE
OUTPATIENT
Start: 2025-06-01 | End: 2025-06-01

## 2025-06-01 RX ORDER — GENTAMICIN SULFATE 1 MG/G
OINTMENT TOPICAL ONCE
OUTPATIENT
Start: 2025-06-01 | End: 2025-06-01

## 2025-06-01 RX ORDER — NEOMYCIN/BACITRACIN/POLYMYXINB 3.5-400-5K
OINTMENT (GRAM) TOPICAL ONCE
OUTPATIENT
Start: 2025-06-01 | End: 2025-06-01

## 2025-06-01 RX ORDER — SILVER SULFADIAZINE 10 MG/G
CREAM TOPICAL ONCE
OUTPATIENT
Start: 2025-06-01 | End: 2025-06-01

## 2025-06-01 RX ORDER — GINSENG 100 MG
CAPSULE ORAL ONCE
OUTPATIENT
Start: 2025-06-01 | End: 2025-06-01

## 2025-06-01 RX ORDER — BETAMETHASONE DIPROPIONATE 0.5 MG/G
CREAM TOPICAL ONCE
OUTPATIENT
Start: 2025-06-01 | End: 2025-06-01

## 2025-06-01 RX ORDER — LIDOCAINE 50 MG/G
OINTMENT TOPICAL ONCE
OUTPATIENT
Start: 2025-06-01 | End: 2025-06-01

## 2025-06-05 ENCOUNTER — HOSPITAL ENCOUNTER (OUTPATIENT)
Dept: WOUND CARE | Age: 50
Discharge: HOME | End: 2025-06-05
Attending: STUDENT IN AN ORGANIZED HEALTH CARE EDUCATION/TRAINING PROGRAM
Payer: MEDICARE

## 2025-06-05 VITALS
TEMPERATURE: 96.8 F | SYSTOLIC BLOOD PRESSURE: 118 MMHG | RESPIRATION RATE: 20 BRPM | HEART RATE: 83 BPM | DIASTOLIC BLOOD PRESSURE: 89 MMHG

## 2025-06-05 DIAGNOSIS — E11.21 DIABETIC NEPHROPATHY ASSOCIATED WITH TYPE 2 DIABETES MELLITUS (HCC): ICD-10-CM

## 2025-06-05 DIAGNOSIS — G89.4 CHRONIC PAIN SYNDROME: ICD-10-CM

## 2025-06-05 DIAGNOSIS — Z99.2 ESRD (END STAGE RENAL DISEASE) ON DIALYSIS (HCC): ICD-10-CM

## 2025-06-05 DIAGNOSIS — N18.6 ESRD (END STAGE RENAL DISEASE) ON DIALYSIS (HCC): ICD-10-CM

## 2025-06-05 DIAGNOSIS — T21.22XD BURN OF SECOND DEGREE OF ABDOMINAL WALL, SUBSEQUENT ENCOUNTER: Primary | ICD-10-CM

## 2025-06-05 PROCEDURE — 16020 DRESS/DEBRID P-THICK BURN S: CPT

## 2025-06-05 RX ORDER — SODIUM CHLOR/HYPOCHLOROUS ACID 0.033 %
SOLUTION, IRRIGATION IRRIGATION ONCE
OUTPATIENT
Start: 2025-06-05 | End: 2025-06-05

## 2025-06-05 RX ORDER — NEOMYCIN/BACITRACIN/POLYMYXINB 3.5-400-5K
OINTMENT (GRAM) TOPICAL ONCE
OUTPATIENT
Start: 2025-06-05 | End: 2025-06-05

## 2025-06-05 RX ORDER — OXYCODONE AND ACETAMINOPHEN 7.5; 325 MG/1; MG/1
1 TABLET ORAL EVERY 8 HOURS PRN
Qty: 90 TABLET | Refills: 0 | Status: SHIPPED | OUTPATIENT
Start: 2025-06-05 | End: 2025-07-05

## 2025-06-05 RX ORDER — LIDOCAINE HYDROCHLORIDE 40 MG/ML
SOLUTION TOPICAL ONCE
OUTPATIENT
Start: 2025-06-05 | End: 2025-06-05

## 2025-06-05 RX ORDER — LIDOCAINE 50 MG/G
OINTMENT TOPICAL ONCE
OUTPATIENT
Start: 2025-06-05 | End: 2025-06-05

## 2025-06-05 RX ORDER — TRIAMCINOLONE ACETONIDE 1 MG/G
OINTMENT TOPICAL ONCE
OUTPATIENT
Start: 2025-06-05 | End: 2025-06-05

## 2025-06-05 RX ORDER — LIDOCAINE HYDROCHLORIDE 20 MG/ML
JELLY TOPICAL ONCE
OUTPATIENT
Start: 2025-06-05 | End: 2025-06-05

## 2025-06-05 RX ORDER — BETAMETHASONE DIPROPIONATE 0.5 MG/G
CREAM TOPICAL ONCE
OUTPATIENT
Start: 2025-06-05 | End: 2025-06-05

## 2025-06-05 RX ORDER — SILVER SULFADIAZINE 10 MG/G
CREAM TOPICAL ONCE
OUTPATIENT
Start: 2025-06-05 | End: 2025-06-05

## 2025-06-05 RX ORDER — MUPIROCIN 20 MG/G
OINTMENT TOPICAL ONCE
OUTPATIENT
Start: 2025-06-05 | End: 2025-06-05

## 2025-06-05 RX ORDER — GINSENG 100 MG
CAPSULE ORAL ONCE
OUTPATIENT
Start: 2025-06-05 | End: 2025-06-05

## 2025-06-05 RX ORDER — LIDOCAINE 40 MG/G
CREAM TOPICAL ONCE
OUTPATIENT
Start: 2025-06-05 | End: 2025-06-05

## 2025-06-05 RX ORDER — BACITRACIN ZINC AND POLYMYXIN B SULFATE 500; 1000 [USP'U]/G; [USP'U]/G
OINTMENT TOPICAL ONCE
OUTPATIENT
Start: 2025-06-05 | End: 2025-06-05

## 2025-06-05 RX ORDER — GENTAMICIN SULFATE 1 MG/G
OINTMENT TOPICAL ONCE
OUTPATIENT
Start: 2025-06-05 | End: 2025-06-05

## 2025-06-05 RX ORDER — CLOBETASOL PROPIONATE 0.5 MG/G
OINTMENT TOPICAL ONCE
OUTPATIENT
Start: 2025-06-05 | End: 2025-06-05

## 2025-06-05 ASSESSMENT — PAIN SCALES - GENERAL: PAINLEVEL_OUTOF10: 6

## 2025-06-05 ASSESSMENT — PAIN DESCRIPTION - DESCRIPTORS: DESCRIPTORS: DISCOMFORT

## 2025-06-05 ASSESSMENT — PAIN DESCRIPTION - LOCATION: LOCATION: ABDOMEN

## 2025-06-05 ASSESSMENT — PAIN - FUNCTIONAL ASSESSMENT: PAIN_FUNCTIONAL_ASSESSMENT: PREVENTS OR INTERFERES SOME ACTIVE ACTIVITIES AND ADLS

## 2025-06-05 ASSESSMENT — PAIN DESCRIPTION - PAIN TYPE: TYPE: ACUTE PAIN

## 2025-06-05 ASSESSMENT — PAIN DESCRIPTION - ONSET: ONSET: ON-GOING

## 2025-06-05 ASSESSMENT — PAIN DESCRIPTION - FREQUENCY: FREQUENCY: CONTINUOUS

## 2025-06-05 NOTE — PATIENT INSTRUCTIONS
PHYSICIAN ORDERS AND DISCHARGE INSTRUCTIONS     Wound Care Notes:  Sees Dr Kumar.  surgical shoes with pegassist to offload right plantar wound 3/13/25                  Applied for Kerecis grafts on 24  Donated Kerecis applied to left lateral foot 24                             Orders for this week: 2025    Abdomen Burn : Wash with soap and water, pat dry.  Apply Betadine to wound bed   Cover with ca alginate and gentac w/ tegaderm  Change Daily       Plan: HBO workup started 9/10/24.   [facility] discharge due to not home bound 24.   Toenails 2025.        Follow Up Instructions: 1 weeks   Primary Wound Care Provider: Shelly Rubalcava CNP  Call  for any questions or concerns.  Central [phone] for imaging lab work

## 2025-06-05 NOTE — PROGRESS NOTES
Wound Care Center Progress Visit      Yovanny Levine  AGE: 50 y.o.   GENDER: male  : 1975  EPISODE DATE:  2025   Referred by: José Luis Izquierdo MD     Subjective:     CHIEF COMPLAINT  WOUND   Problem List Items Addressed This Visit          Endocrine    Diabetic nephropathy associated with type 2 diabetes mellitus (HCC)       Musculoskeletal and Integument    Burn of second degree of abdominal wall, subsequent encounter - Primary    Relevant Orders    Initiate Outpatient Wound Care Protocol       Genitourinary    ESRD (end stage renal disease) on dialysis (HCC)       Other    Chronic pain syndrome    Relevant Medications    oxyCODONE-acetaminophen (PERCOCET) 7.5-325 MG per tablet     Chief Complaint   Patient presents with    Wound Check        HISTORY of PRESENT ILLNESS      Yovanny Levine is a 50 y.o. male who presents to the Wound Clinic for evaluation and treatment of Chronic diabetic  and  surgical ulcer(s) of the left foot. The condition is of marked severity. The patient has been seeing Dr. Webber at the wound clinic since 24. He was hospitalized -24  Sepsis secondary to left foot cellulitis and chronic left diabetic foot ulcer. General surgery consulted, patient underwent incision and drainage of left foot  and again 2/10/24, IV antibiotics given, home on Augmentin through 3/20/24.  Hospitalized 3/12-3/20/24 with diabetic foot infection, IV antibiotics, switched to oral Cipro. Hospitalized -24 with I&D and foot debridement per Dr. Forrest 24 with wound vac placement. Osteomyelitis, infectious disease consult: plan for vancomycin with dialysis for 6-week cumulative course with end date of 24. The patient has significant underlying medical conditions as below. José Luis Izquierdo MD is PCP.    Wound Pain Timing/Severity: waxing and waning, moderate  Quality of pain: shooting, pins and needles  Severity of pain:  5 / 10   Modifying Factors: diabetes, chronic

## 2025-06-12 ENCOUNTER — HOSPITAL ENCOUNTER (OUTPATIENT)
Dept: WOUND CARE | Age: 50
Discharge: HOME | End: 2025-06-12
Attending: STUDENT IN AN ORGANIZED HEALTH CARE EDUCATION/TRAINING PROGRAM
Payer: MEDICARE

## 2025-06-12 VITALS
SYSTOLIC BLOOD PRESSURE: 137 MMHG | TEMPERATURE: 97.6 F | RESPIRATION RATE: 20 BRPM | HEART RATE: 82 BPM | DIASTOLIC BLOOD PRESSURE: 88 MMHG

## 2025-06-12 DIAGNOSIS — N18.6 ESRD (END STAGE RENAL DISEASE) ON DIALYSIS (HCC): ICD-10-CM

## 2025-06-12 DIAGNOSIS — Z89.422: ICD-10-CM

## 2025-06-12 DIAGNOSIS — E11.622 TYPE 2 DIABETES MELLITUS WITH OTHER SKIN ULCER, WITH LONG-TERM CURRENT USE OF INSULIN (HCC): ICD-10-CM

## 2025-06-12 DIAGNOSIS — Z99.2 ESRD (END STAGE RENAL DISEASE) ON DIALYSIS (HCC): ICD-10-CM

## 2025-06-12 DIAGNOSIS — T21.22XD BURN OF SECOND DEGREE OF ABDOMINAL WALL, SUBSEQUENT ENCOUNTER: Primary | ICD-10-CM

## 2025-06-12 DIAGNOSIS — Z79.4 TYPE 2 DIABETES MELLITUS WITH OTHER SKIN ULCER, WITH LONG-TERM CURRENT USE OF INSULIN (HCC): ICD-10-CM

## 2025-06-12 PROCEDURE — 16020 DRESS/DEBRID P-THICK BURN S: CPT | Performed by: NURSE PRACTITIONER

## 2025-06-12 PROCEDURE — 11042 DBRDMT SUBQ TIS 1ST 20SQCM/<: CPT

## 2025-06-12 PROCEDURE — 16020 DRESS/DEBRID P-THICK BURN S: CPT

## 2025-06-12 RX ORDER — GINSENG 100 MG
CAPSULE ORAL ONCE
OUTPATIENT
Start: 2025-06-12 | End: 2025-06-12

## 2025-06-12 RX ORDER — NEOMYCIN/BACITRACIN/POLYMYXINB 3.5-400-5K
OINTMENT (GRAM) TOPICAL ONCE
OUTPATIENT
Start: 2025-06-12 | End: 2025-06-12

## 2025-06-12 RX ORDER — LIDOCAINE 40 MG/G
CREAM TOPICAL ONCE
OUTPATIENT
Start: 2025-06-12 | End: 2025-06-12

## 2025-06-12 RX ORDER — LIDOCAINE HYDROCHLORIDE 20 MG/ML
JELLY TOPICAL ONCE
OUTPATIENT
Start: 2025-06-12 | End: 2025-06-12

## 2025-06-12 RX ORDER — SILVER SULFADIAZINE 10 MG/G
CREAM TOPICAL ONCE
OUTPATIENT
Start: 2025-06-12 | End: 2025-06-12

## 2025-06-12 RX ORDER — MUPIROCIN 2 %
OINTMENT (GRAM) TOPICAL ONCE
OUTPATIENT
Start: 2025-06-12 | End: 2025-06-12

## 2025-06-12 RX ORDER — CLOBETASOL PROPIONATE 0.5 MG/G
OINTMENT TOPICAL ONCE
OUTPATIENT
Start: 2025-06-12 | End: 2025-06-12

## 2025-06-12 RX ORDER — GENTAMICIN SULFATE 1 MG/G
OINTMENT TOPICAL ONCE
OUTPATIENT
Start: 2025-06-12 | End: 2025-06-12

## 2025-06-12 RX ORDER — BETAMETHASONE DIPROPIONATE 0.5 MG/G
CREAM TOPICAL ONCE
OUTPATIENT
Start: 2025-06-12 | End: 2025-06-12

## 2025-06-12 RX ORDER — SODIUM CHLOR/HYPOCHLOROUS ACID 0.033 %
SOLUTION, IRRIGATION IRRIGATION ONCE
OUTPATIENT
Start: 2025-06-12 | End: 2025-06-12

## 2025-06-12 RX ORDER — LIDOCAINE 50 MG/G
OINTMENT TOPICAL ONCE
OUTPATIENT
Start: 2025-06-12 | End: 2025-06-12

## 2025-06-12 RX ORDER — LIDOCAINE HYDROCHLORIDE 40 MG/ML
SOLUTION TOPICAL ONCE
OUTPATIENT
Start: 2025-06-12 | End: 2025-06-12

## 2025-06-12 RX ORDER — BACITRACIN ZINC AND POLYMYXIN B SULFATE 500; 1000 [USP'U]/G; [USP'U]/G
OINTMENT TOPICAL ONCE
OUTPATIENT
Start: 2025-06-12 | End: 2025-06-12

## 2025-06-12 RX ORDER — TRIAMCINOLONE ACETONIDE 1 MG/G
OINTMENT TOPICAL ONCE
OUTPATIENT
Start: 2025-06-12 | End: 2025-06-12

## 2025-06-12 ASSESSMENT — PAIN DESCRIPTION - PAIN TYPE: TYPE: ACUTE PAIN;CHRONIC PAIN

## 2025-06-12 ASSESSMENT — PAIN - FUNCTIONAL ASSESSMENT: PAIN_FUNCTIONAL_ASSESSMENT: PREVENTS OR INTERFERES SOME ACTIVE ACTIVITIES AND ADLS

## 2025-06-12 ASSESSMENT — PAIN DESCRIPTION - ONSET: ONSET: ON-GOING

## 2025-06-12 ASSESSMENT — PAIN DESCRIPTION - DESCRIPTORS: DESCRIPTORS: ACHING;DISCOMFORT

## 2025-06-12 ASSESSMENT — PAIN SCALES - GENERAL: PAINLEVEL_OUTOF10: 5

## 2025-06-12 ASSESSMENT — PAIN DESCRIPTION - LOCATION: LOCATION: ABDOMEN

## 2025-06-12 ASSESSMENT — PAIN DESCRIPTION - ORIENTATION: ORIENTATION: MID;LOWER

## 2025-06-12 ASSESSMENT — PAIN DESCRIPTION - FREQUENCY: FREQUENCY: CONTINUOUS

## 2025-06-12 NOTE — PROGRESS NOTES
1044   Margins Defined edges 25 1044   Wound Thickness Description not for Pressure Injury Full thickness 25 1044   Number of days: 156         Total  Area  Debrided: less than 5% total body surface    Bleeding:  Minimal    Hemostasis Achieved:  by pressure    Procedural Pain:  0  / 10     Post Procedural Pain:  0 / 10     Response to treatment:  Well tolerated by patient.       Plan:     Patient Instructions   PHYSICIAN ORDERS AND DISCHARGE INSTRUCTIONS     Wound Care Notes:  Sees Dr Kumar.  surgical shoes with pegassist to offload right plantar wound 3/13/25                  Applied for Kerecis grafts on 24  Donated Kerecis applied to left lateral foot 24                             Orders for this week: 2025    Abdomen Burn : Wash with soap and water, pat dry.  Apply Betadine to wound bed   Cover with ca alginate and gentac w/ tegaderm  Change Daily       Plan: HBO workup started 9/10/24.   CM discharge due to not home bound 24.   Toenails 2025.        Follow Up Instructions: 1 weeks   Primary Wound Care Provider: Shelly Rubalcava CNP  Call  for any questions or concerns.  Central [phone] for imaging lab work

## 2025-06-12 NOTE — PATIENT INSTRUCTIONS
PHYSICIAN ORDERS AND DISCHARGE INSTRUCTIONS     Wound Care Notes:  Sees Dr Kumar.  surgical shoes with pegassist to offload right plantar wound 3/13/25                  Applied for Kerecis grafts on 24  Donated Kerecis applied to left lateral foot 24                             Orders for this week: 2025    Abdomen Burn : Wash with soap and water, pat dry.  Apply Betadine to wound bed   Cover with ca alginate and gentac w/ tegaderm  Change Daily       Plan: HBO workup started 9/10/24.   [facility] discharge due to not home bound 24.   Toenails 2025.        Follow Up Instructions: 1 weeks   Primary Wound Care Provider: Shelly Rubalcava CNP  Call  for any questions or concerns.  Central [phone] for imaging lab work

## 2025-06-17 NOTE — PROGRESS NOTES
Physician Progress Note      PATIENT:               MICHELINE WILSON  CSN #:                  [id number]  :                       1975  ADMIT DATE:       2025 8:43 PM  DISCH DATE:        2025 2:59 PM  RESPONDING  PROVIDER #:        Andrae Conn MD          QUERY TEXT:    \"Patient meets SIRS/sepsis criteria- WBC 11.2, tachycardic on presentation-   Lactic acid 2, procalcitonin 1.20\" was documented in the H&P on .  The   diagnosis was not noted in subsequent documentation.  Please clarify the   status of this condition.    The clinical indicators include:  --49 yo M admitted with worsening SOB. PMH: CAD, HTN, diabetes mellitus type   2, PUD, chronic normocytic anemia, chronic respiratory failure on 3 L of   oxygen at night, severe obesity with BMI 36.92    --WBC 15.2 on , HR , resp 31 on day of admission, lactic acid 2.0,   procal 1.20, afebrile    --IVF bolus, IV Maxipime, IV Gentamycin, IV Vancomycin  Options provided:  -- Sepsis due to parainfluenza infection confirmed after study  -- Sepsis ruled out after study, this patient has SIRS  -- Other - I will add my own diagnosis  -- Disagree - Not applicable / Not valid  -- Disagree - Clinically unable to determine / Unknown  -- Refer to Clinical Documentation Reviewer    PROVIDER RESPONSE TEXT:    Sepsis due to parainfluenza infection confirmed after study.    Query created by: Tammy Villafuerte on 6/10/2025 10:28 AM      Electronically signed by:  Andrae Conn MD 2025 8:22 AM

## 2025-06-19 ENCOUNTER — HOSPITAL ENCOUNTER (OUTPATIENT)
Dept: WOUND CARE | Age: 50
Discharge: HOME | End: 2025-06-19
Attending: STUDENT IN AN ORGANIZED HEALTH CARE EDUCATION/TRAINING PROGRAM
Payer: MEDICARE

## 2025-06-19 VITALS — TEMPERATURE: 97.1 F | RESPIRATION RATE: 16 BRPM

## 2025-06-19 DIAGNOSIS — T21.22XD BURN OF SECOND DEGREE OF ABDOMINAL WALL, SUBSEQUENT ENCOUNTER: Primary | ICD-10-CM

## 2025-06-19 DIAGNOSIS — S01.01XA LACERATION OF SCALP WITHOUT FOREIGN BODY, INITIAL ENCOUNTER: ICD-10-CM

## 2025-06-19 PROBLEM — S01.00XA OPEN WOUND OF SCALP: Status: ACTIVE | Noted: 2025-06-19

## 2025-06-19 PROCEDURE — 99213 OFFICE O/P EST LOW 20 MIN: CPT | Performed by: NURSE PRACTITIONER

## 2025-06-19 PROCEDURE — 16020 DRESS/DEBRID P-THICK BURN S: CPT

## 2025-06-19 PROCEDURE — 16020 DRESS/DEBRID P-THICK BURN S: CPT | Performed by: NURSE PRACTITIONER

## 2025-06-19 ASSESSMENT — PAIN - FUNCTIONAL ASSESSMENT: PAIN_FUNCTIONAL_ASSESSMENT: PREVENTS OR INTERFERES WITH MANY ACTIVE NOT PASSIVE ACTIVITIES

## 2025-06-19 ASSESSMENT — PAIN DESCRIPTION - ORIENTATION: ORIENTATION: MID;LEFT

## 2025-06-19 ASSESSMENT — PAIN DESCRIPTION - DESCRIPTORS: DESCRIPTORS: ACHING;DISCOMFORT

## 2025-06-19 ASSESSMENT — PAIN SCALES - WONG BAKER: WONGBAKER_NUMERICALRESPONSE: HURTS LITTLE MORE

## 2025-06-19 ASSESSMENT — PAIN SCALES - GENERAL: PAINLEVEL_OUTOF10: 5

## 2025-06-19 ASSESSMENT — PAIN DESCRIPTION - LOCATION: LOCATION: ABDOMEN

## 2025-06-19 NOTE — PATIENT INSTRUCTIONS
PHYSICIAN ORDERS AND DISCHARGE INSTRUCTIONS     Wound Care Notes:  Sees Dr Kumar.  surgical shoes with pegassist to offload right plantar wound 3/13/25                  Applied for Kerecis grafts on 24  Donated Kerecis applied to left lateral foot 24                             Orders for this week: 2025    Abdomen Burn : Wash with soap and water, pat dry.  Apply Betadine to wound bed   Cover with ca alginate and gentac w/ tegaderm  Change Daily     Head: Shave area and apply betadine and gentac       Plan: HBO workup started 9/10/24.   [facility] discharge due to not home bound 24.   Toenails 2025.        Follow Up Instructions: 1 weeks   Primary Wound Care Provider: Shelly Rubalcava CNP  Call  for any questions or concerns.  Central [phone] for imaging lab work

## 2025-06-19 NOTE — PROGRESS NOTES
pressure    Procedural Pain:  0  / 10     Post Procedural Pain:  0 / 10     Response to treatment:  Well tolerated by patient.       Plan:     Patient Instructions   PHYSICIAN ORDERS AND DISCHARGE INSTRUCTIONS     Wound Care Notes:  Sees Dr Kumar.  surgical shoes with pegassist to offload right plantar wound 3/13/25                  Applied for Kerecis grafts on 24  Donated Kerecis applied to left lateral foot 24                             Orders for this week: 2025    Abdomen Burn : Wash with soap and water, pat dry.  Apply Betadine to wound bed   Cover with ca alginate and gentac w/ tegaderm  Change Daily     Head: Shave area and apply betadine and gentac       Plan: HBO workup started 9/10/24.   [facility] discharge due to not home bound 24.   Toenails 2025.        Follow Up Instructions: 1 weeks   Primary Wound Care Provider: Shelly Rubalcava CNP  Call  for any questions or concerns.  Central [phone] for imaging lab work

## 2025-06-20 ENCOUNTER — HOSPITAL ENCOUNTER (INPATIENT)
Age: 50
LOS: 5 days | Discharge: HOME | DRG: 321 | End: 2025-06-25
Attending: STUDENT IN AN ORGANIZED HEALTH CARE EDUCATION/TRAINING PROGRAM | Admitting: STUDENT IN AN ORGANIZED HEALTH CARE EDUCATION/TRAINING PROGRAM
Payer: MEDICARE

## 2025-06-20 ENCOUNTER — APPOINTMENT (OUTPATIENT)
Dept: GENERAL RADIOLOGY | Age: 50
DRG: 321 | End: 2025-06-20
Payer: MEDICARE

## 2025-06-20 DIAGNOSIS — I20.9 ANGINA PECTORIS: ICD-10-CM

## 2025-06-20 DIAGNOSIS — R07.9 CHEST PAIN, UNSPECIFIED TYPE: Primary | ICD-10-CM

## 2025-06-20 DIAGNOSIS — R79.89 ELEVATED TROPONIN: ICD-10-CM

## 2025-06-20 DIAGNOSIS — R07.9 CHEST PAIN: ICD-10-CM

## 2025-06-20 DIAGNOSIS — I50.9 ACUTE CONGESTIVE HEART FAILURE, UNSPECIFIED HEART FAILURE TYPE (HCC): ICD-10-CM

## 2025-06-20 PROBLEM — R07.89 ATYPICAL CHEST PAIN: Status: ACTIVE | Noted: 2025-06-20

## 2025-06-20 LAB
ALBUMIN SERPL-MCNC: 4.1 G/DL (ref 3.4–5)
ALBUMIN/GLOB SERPL: 1.4 {RATIO} (ref 1.1–2.2)
ALP SERPL-CCNC: 75 U/L (ref 40–129)
ALT SERPL-CCNC: 13 U/L (ref 10–40)
ANION GAP SERPL CALCULATED.3IONS-SCNC: 15 MMOL/L (ref 9–17)
ANION GAP SERPL CALCULATED.3IONS-SCNC: 16 MMOL/L (ref 9–17)
ARTERIAL PATENCY WRIST A: ABNORMAL
ARTERIAL PATENCY WRIST A: NO
AST SERPL-CCNC: 18 U/L (ref 15–37)
BILIRUB SERPL-MCNC: 0.5 MG/DL (ref 0–1)
BNP SERPL-MCNC: ABNORMAL PG/ML (ref 0–125)
BODY TEMPERATURE: 37
BODY TEMPERATURE: 37
BUN SERPL-MCNC: 26 MG/DL (ref 7–20)
BUN SERPL-MCNC: 28 MG/DL (ref 7–20)
CALCIUM SERPL-MCNC: 8.8 MG/DL (ref 8.3–10.6)
CALCIUM SERPL-MCNC: 9.1 MG/DL (ref 8.3–10.6)
CHLORIDE SERPL-SCNC: 91 MMOL/L (ref 99–110)
CHLORIDE SERPL-SCNC: 92 MMOL/L (ref 99–110)
CHOLEST SERPL-MCNC: 163 MG/DL (ref 125–199)
CHP ED QC CHECK: YES
CO2 SERPL-SCNC: 26 MMOL/L (ref 21–32)
CO2 SERPL-SCNC: 27 MMOL/L (ref 21–32)
COHGB MFR BLD: 1.2 % (ref 0.5–1.5)
COHGB MFR BLD: 1.4 % (ref 0.5–1.5)
CREAT SERPL-MCNC: 4 MG/DL (ref 0.9–1.3)
CREAT SERPL-MCNC: 4.8 MG/DL (ref 0.9–1.3)
ERYTHROCYTE [DISTWIDTH] IN BLOOD BY AUTOMATED COUNT: 13.8 % (ref 11.7–14.9)
ERYTHROCYTE [DISTWIDTH] IN BLOOD BY AUTOMATED COUNT: 14 % (ref 11.7–14.9)
EST. AVERAGE GLUCOSE BLD GHB EST-MCNC: 132 MG/DL
GFR, ESTIMATED: 13 ML/MIN/1.73M2
GFR, ESTIMATED: 16 ML/MIN/1.73M2
GLUCOSE BLD-MCNC: 203 MG/DL (ref 74–99)
GLUCOSE BLD-MCNC: 97 MG/DL
GLUCOSE BLD-MCNC: 97 MG/DL (ref 74–99)
GLUCOSE SERPL-MCNC: 129 MG/DL (ref 74–99)
GLUCOSE SERPL-MCNC: 93 MG/DL (ref 74–99)
HBA1C MFR BLD: 6.2 % (ref 4.2–6.3)
HCO3 VENOUS: 27.6 MMOL/L (ref 22–29)
HCO3 VENOUS: 29.7 MMOL/L (ref 22–29)
HCT VFR BLD AUTO: 33.1 % (ref 42–52)
HCT VFR BLD AUTO: 34.2 % (ref 42–52)
HDLC SERPL-MCNC: 36 MG/DL
HGB BLD-MCNC: 11.3 G/DL (ref 13.5–18)
HGB BLD-MCNC: 11.4 G/DL (ref 13.5–18)
LDLC SERPL CALC-MCNC: 95 MG/DL
MAGNESIUM SERPL-MCNC: 2.1 MG/DL (ref 1.8–2.4)
MAGNESIUM SERPL-MCNC: 2.1 MG/DL (ref 1.8–2.4)
MCH RBC QN AUTO: 31.2 PG (ref 27–31)
MCH RBC QN AUTO: 31.5 PG (ref 27–31)
MCHC RBC AUTO-ENTMCNC: 33.3 G/DL (ref 32–36)
MCHC RBC AUTO-ENTMCNC: 34.1 G/DL (ref 32–36)
MCV RBC AUTO: 92.2 FL (ref 78–100)
MCV RBC AUTO: 93.7 FL (ref 78–100)
METHEMOGLOBIN: 0 % (ref 0.5–1.5)
METHEMOGLOBIN: 0.1 % (ref 0.5–1.5)
OXYHGB MFR BLD: 43.5 %
OXYHGB MFR BLD: 56.9 %
PCO2 VENOUS: 36 MM HG (ref 38–54)
PCO2 VENOUS: 46.1 MM HG (ref 38–54)
PH VENOUS: 7.43 (ref 7.32–7.43)
PH VENOUS: 7.5 (ref 7.32–7.43)
PHOSPHATE SERPL-MCNC: 4.1 MG/DL (ref 2.5–4.9)
PLATELET # BLD AUTO: 205 K/UL (ref 140–440)
PLATELET # BLD AUTO: 206 K/UL (ref 140–440)
PMV BLD AUTO: 9.7 FL (ref 7.5–11.1)
PMV BLD AUTO: 9.8 FL (ref 7.5–11.1)
PO2 VENOUS: 26.4 MM HG (ref 23–48)
PO2 VENOUS: 29.7 MM HG (ref 23–48)
POSITIVE BASE EXCESS, VEN: 4.5 MMOL/L (ref 0–3)
POSITIVE BASE EXCESS, VEN: 4.6 MMOL/L (ref 0–3)
POTASSIUM SERPL-SCNC: 3.5 MMOL/L (ref 3.5–5.1)
POTASSIUM SERPL-SCNC: 3.5 MMOL/L (ref 3.5–5.1)
PROT SERPL-MCNC: 7 G/DL (ref 6.4–8.2)
RBC # BLD AUTO: 3.59 M/UL (ref 4.6–6.2)
RBC # BLD AUTO: 3.65 M/UL (ref 4.6–6.2)
SODIUM SERPL-SCNC: 134 MMOL/L (ref 136–145)
SODIUM SERPL-SCNC: 134 MMOL/L (ref 136–145)
TRIGL SERPL-MCNC: 157 MG/DL
TROPONIN I SERPL HS-MCNC: 165 NG/L (ref 0–22)
TROPONIN I SERPL HS-MCNC: 172 NG/L (ref 0–22)
TROPONIN I SERPL HS-MCNC: 176 NG/L (ref 0–22)
WBC OTHER # BLD: 6.1 K/UL (ref 4–10.5)
WBC OTHER # BLD: 6.5 K/UL (ref 4–10.5)

## 2025-06-20 PROCEDURE — 99285 EMERGENCY DEPT VISIT HI MDM: CPT

## 2025-06-20 PROCEDURE — 83880 ASSAY OF NATRIURETIC PEPTIDE: CPT

## 2025-06-20 PROCEDURE — 71045 X-RAY EXAM CHEST 1 VIEW: CPT

## 2025-06-20 PROCEDURE — 83735 ASSAY OF MAGNESIUM: CPT

## 2025-06-20 PROCEDURE — 82805 BLOOD GASES W/O2 SATURATION: CPT

## 2025-06-20 PROCEDURE — 6370000000 HC RX 637 (ALT 250 FOR IP): Performed by: STUDENT IN AN ORGANIZED HEALTH CARE EDUCATION/TRAINING PROGRAM

## 2025-06-20 PROCEDURE — 6360000002 HC RX W HCPCS: Performed by: STUDENT IN AN ORGANIZED HEALTH CARE EDUCATION/TRAINING PROGRAM

## 2025-06-20 PROCEDURE — 83036 HEMOGLOBIN GLYCOSYLATED A1C: CPT

## 2025-06-20 PROCEDURE — 5A1D70Z PERFORMANCE OF URINARY FILTRATION, INTERMITTENT, LESS THAN 6 HOURS PER DAY: ICD-10-PCS | Performed by: INTERNAL MEDICINE

## 2025-06-20 PROCEDURE — 84100 ASSAY OF PHOSPHORUS: CPT

## 2025-06-20 PROCEDURE — 1200000000 HC SEMI PRIVATE

## 2025-06-20 PROCEDURE — 80048 BASIC METABOLIC PNL TOTAL CA: CPT

## 2025-06-20 PROCEDURE — 85027 COMPLETE CBC AUTOMATED: CPT

## 2025-06-20 PROCEDURE — 36415 COLL VENOUS BLD VENIPUNCTURE: CPT

## 2025-06-20 PROCEDURE — 6370000000 HC RX 637 (ALT 250 FOR IP): Performed by: NURSE PRACTITIONER

## 2025-06-20 PROCEDURE — 80053 COMPREHEN METABOLIC PANEL: CPT

## 2025-06-20 PROCEDURE — 2700000000 HC OXYGEN THERAPY PER DAY

## 2025-06-20 PROCEDURE — 2500000003 HC RX 250 WO HCPCS: Performed by: STUDENT IN AN ORGANIZED HEALTH CARE EDUCATION/TRAINING PROGRAM

## 2025-06-20 PROCEDURE — 82962 GLUCOSE BLOOD TEST: CPT

## 2025-06-20 PROCEDURE — 80061 LIPID PANEL: CPT

## 2025-06-20 PROCEDURE — 6370000000 HC RX 637 (ALT 250 FOR IP)

## 2025-06-20 PROCEDURE — 84484 ASSAY OF TROPONIN QUANT: CPT

## 2025-06-20 PROCEDURE — 93005 ELECTROCARDIOGRAM TRACING: CPT

## 2025-06-20 RX ORDER — OXYCODONE AND ACETAMINOPHEN 7.5; 325 MG/1; MG/1
1 TABLET ORAL EVERY 8 HOURS PRN
Status: DISCONTINUED | OUTPATIENT
Start: 2025-06-20 | End: 2025-06-25 | Stop reason: HOSPADM

## 2025-06-20 RX ORDER — GLUCAGON 1 MG/ML
1 KIT INJECTION PRN
Status: DISCONTINUED | OUTPATIENT
Start: 2025-06-20 | End: 2025-06-25 | Stop reason: HOSPADM

## 2025-06-20 RX ORDER — FUROSEMIDE 10 MG/ML
40 INJECTION INTRAMUSCULAR; INTRAVENOUS ONCE
Status: DISCONTINUED | OUTPATIENT
Start: 2025-06-20 | End: 2025-06-20

## 2025-06-20 RX ORDER — SODIUM CHLORIDE 0.9 % (FLUSH) 0.9 %
5-40 SYRINGE (ML) INJECTION EVERY 12 HOURS SCHEDULED
Status: DISCONTINUED | OUTPATIENT
Start: 2025-06-20 | End: 2025-06-25 | Stop reason: HOSPADM

## 2025-06-20 RX ORDER — ONDANSETRON 4 MG/1
4 TABLET, ORALLY DISINTEGRATING ORAL EVERY 8 HOURS PRN
Status: DISCONTINUED | OUTPATIENT
Start: 2025-06-20 | End: 2025-06-25 | Stop reason: HOSPADM

## 2025-06-20 RX ORDER — TRAZODONE HYDROCHLORIDE 50 MG/1
150 TABLET ORAL NIGHTLY
Status: DISCONTINUED | OUTPATIENT
Start: 2025-06-20 | End: 2025-06-25 | Stop reason: HOSPADM

## 2025-06-20 RX ORDER — TRAZODONE HYDROCHLORIDE 150 MG/1
150 TABLET ORAL NIGHTLY
COMMUNITY

## 2025-06-20 RX ORDER — PANTOPRAZOLE SODIUM 40 MG/1
40 TABLET, DELAYED RELEASE ORAL DAILY
Status: DISCONTINUED | OUTPATIENT
Start: 2025-06-20 | End: 2025-06-25 | Stop reason: HOSPADM

## 2025-06-20 RX ORDER — DEXTROSE MONOHYDRATE 100 MG/ML
INJECTION, SOLUTION INTRAVENOUS CONTINUOUS PRN
Status: DISCONTINUED | OUTPATIENT
Start: 2025-06-20 | End: 2025-06-25 | Stop reason: HOSPADM

## 2025-06-20 RX ORDER — ASPIRIN 81 MG/1
81 TABLET, CHEWABLE ORAL DAILY
Status: DISCONTINUED | OUTPATIENT
Start: 2025-06-21 | End: 2025-06-25 | Stop reason: HOSPADM

## 2025-06-20 RX ORDER — INSULIN LISPRO 100 [IU]/ML
0-4 INJECTION, SOLUTION INTRAVENOUS; SUBCUTANEOUS
Status: DISCONTINUED | OUTPATIENT
Start: 2025-06-20 | End: 2025-06-22 | Stop reason: SDUPTHER

## 2025-06-20 RX ORDER — ATORVASTATIN CALCIUM 40 MG/1
40 TABLET, FILM COATED ORAL NIGHTLY
Status: DISCONTINUED | OUTPATIENT
Start: 2025-06-20 | End: 2025-06-25 | Stop reason: HOSPADM

## 2025-06-20 RX ORDER — CLOPIDOGREL BISULFATE 75 MG/1
75 TABLET ORAL DAILY
Status: DISCONTINUED | OUTPATIENT
Start: 2025-06-20 | End: 2025-06-24

## 2025-06-20 RX ORDER — ACETAMINOPHEN 325 MG/1
650 TABLET ORAL EVERY 6 HOURS PRN
Status: DISCONTINUED | OUTPATIENT
Start: 2025-06-20 | End: 2025-06-25 | Stop reason: HOSPADM

## 2025-06-20 RX ORDER — SODIUM CHLORIDE 9 MG/ML
INJECTION, SOLUTION INTRAVENOUS PRN
Status: DISCONTINUED | OUTPATIENT
Start: 2025-06-20 | End: 2025-06-25 | Stop reason: HOSPADM

## 2025-06-20 RX ORDER — ACETAMINOPHEN 650 MG/1
650 SUPPOSITORY RECTAL EVERY 6 HOURS PRN
Status: DISCONTINUED | OUTPATIENT
Start: 2025-06-20 | End: 2025-06-25 | Stop reason: HOSPADM

## 2025-06-20 RX ORDER — NITROGLYCERIN 0.4 MG/1
0.4 TABLET SUBLINGUAL ONCE
Status: COMPLETED | OUTPATIENT
Start: 2025-06-20 | End: 2025-06-20

## 2025-06-20 RX ORDER — FUROSEMIDE 10 MG/ML
40 INJECTION INTRAMUSCULAR; INTRAVENOUS 2 TIMES DAILY
Status: DISCONTINUED | OUTPATIENT
Start: 2025-06-20 | End: 2025-06-24

## 2025-06-20 RX ORDER — ROPINIROLE 0.25 MG/1
0.25 TABLET, FILM COATED ORAL NIGHTLY
Status: DISCONTINUED | OUTPATIENT
Start: 2025-06-20 | End: 2025-06-25 | Stop reason: HOSPADM

## 2025-06-20 RX ORDER — POLYETHYLENE GLYCOL 3350 17 G/17G
17 POWDER, FOR SOLUTION ORAL DAILY PRN
Status: DISCONTINUED | OUTPATIENT
Start: 2025-06-20 | End: 2025-06-25 | Stop reason: HOSPADM

## 2025-06-20 RX ORDER — HYDRALAZINE HYDROCHLORIDE 25 MG/1
25 TABLET, FILM COATED ORAL 2 TIMES DAILY
Status: DISCONTINUED | OUTPATIENT
Start: 2025-06-20 | End: 2025-06-22

## 2025-06-20 RX ORDER — SODIUM CHLORIDE 0.9 % (FLUSH) 0.9 %
5-40 SYRINGE (ML) INJECTION PRN
Status: DISCONTINUED | OUTPATIENT
Start: 2025-06-20 | End: 2025-06-25 | Stop reason: HOSPADM

## 2025-06-20 RX ORDER — ENOXAPARIN SODIUM 100 MG/ML
30 INJECTION SUBCUTANEOUS DAILY
Status: DISCONTINUED | OUTPATIENT
Start: 2025-06-20 | End: 2025-06-24

## 2025-06-20 RX ORDER — ASPIRIN 81 MG/1
324 TABLET, CHEWABLE ORAL ONCE
Status: COMPLETED | OUTPATIENT
Start: 2025-06-20 | End: 2025-06-20

## 2025-06-20 RX ORDER — INSULIN LISPRO 100 [IU]/ML
0-4 INJECTION, SOLUTION INTRAVENOUS; SUBCUTANEOUS
Status: DISCONTINUED | OUTPATIENT
Start: 2025-06-20 | End: 2025-06-25 | Stop reason: HOSPADM

## 2025-06-20 RX ORDER — ONDANSETRON 2 MG/ML
4 INJECTION INTRAMUSCULAR; INTRAVENOUS EVERY 6 HOURS PRN
Status: DISCONTINUED | OUTPATIENT
Start: 2025-06-20 | End: 2025-06-25 | Stop reason: HOSPADM

## 2025-06-20 RX ADMIN — HYDRALAZINE HYDROCHLORIDE 25 MG: 25 TABLET ORAL at 22:29

## 2025-06-20 RX ADMIN — CLOPIDOGREL BISULFATE 75 MG: 75 TABLET, FILM COATED ORAL at 19:00

## 2025-06-20 RX ADMIN — PANTOPRAZOLE SODIUM 40 MG: 40 TABLET, DELAYED RELEASE ORAL at 19:02

## 2025-06-20 RX ADMIN — NITROGLYCERIN 0.4 MG: 0.4 TABLET SUBLINGUAL at 16:24

## 2025-06-20 RX ADMIN — ATORVASTATIN CALCIUM 40 MG: 40 TABLET, FILM COATED ORAL at 22:29

## 2025-06-20 RX ADMIN — ASPIRIN 324 MG: 81 TABLET, CHEWABLE ORAL at 15:40

## 2025-06-20 RX ADMIN — OXYCODONE HYDROCHLORIDE AND ACETAMINOPHEN 1 TABLET: 7.5; 325 TABLET ORAL at 19:00

## 2025-06-20 RX ADMIN — NITROGLYCERIN 0.4 MG: 0.4 TABLET SUBLINGUAL at 15:41

## 2025-06-20 RX ADMIN — FUROSEMIDE 40 MG: 10 INJECTION, SOLUTION INTRAMUSCULAR; INTRAVENOUS at 19:02

## 2025-06-20 RX ADMIN — ENOXAPARIN SODIUM 30 MG: 100 INJECTION SUBCUTANEOUS at 19:02

## 2025-06-20 RX ADMIN — TRAZODONE HYDROCHLORIDE 150 MG: 50 TABLET ORAL at 22:29

## 2025-06-20 RX ADMIN — SODIUM CHLORIDE, PRESERVATIVE FREE 10 ML: 5 INJECTION INTRAVENOUS at 22:28

## 2025-06-20 RX ADMIN — ROPINIROLE HYDROCHLORIDE 0.25 MG: 0.25 TABLET, FILM COATED ORAL at 22:28

## 2025-06-20 RX ADMIN — INSULIN LISPRO 1 UNITS: 100 INJECTION, SOLUTION INTRAVENOUS; SUBCUTANEOUS at 22:28

## 2025-06-20 ASSESSMENT — PAIN SCALES - GENERAL
PAINLEVEL_OUTOF10: 6
PAINLEVEL_OUTOF10: 7
PAINLEVEL_OUTOF10: 4

## 2025-06-20 ASSESSMENT — PAIN DESCRIPTION - DESCRIPTORS
DESCRIPTORS: ACHING
DESCRIPTORS: ACHING;STABBING

## 2025-06-20 ASSESSMENT — PAIN DESCRIPTION - ORIENTATION
ORIENTATION: RIGHT;LEFT;LOWER;MID
ORIENTATION: MID

## 2025-06-20 ASSESSMENT — PAIN DESCRIPTION - LOCATION
LOCATION: CHEST
LOCATION: FOOT;BACK

## 2025-06-20 ASSESSMENT — PAIN - FUNCTIONAL ASSESSMENT: PAIN_FUNCTIONAL_ASSESSMENT: ACTIVITIES ARE NOT PREVENTED

## 2025-06-20 NOTE — H&P
V2.0  History and Physical      Name:  Yovanny Levine /Age/Sex: 1975  (50 y.o. male)   MRN & CSN:  [id number] & [id number] Encounter Date/Time: 2025 5:55 PM EDT   Location:  [address] [place] [address] PCP: José Luis Izquierdo MD       Hospital Day: 1    Assessment and Plan:   Yovanny Levine is a 50 y.o. male  who presents with Atypical chest pain    Hospital Problems           Last Modified POA    * (Principal) Atypical chest pain 2025 Yes       Hospital course until today      Assessment and Plan:  Elevated troponin ACS rule out cardiology consulted telemetry in place trend troponin.  Currently on aspirin and statin cardiology has been consulted and n.p.o. at midnight for consideration of stress test in the morning  ESRD on hemodialysis nephrology been consulted  History of chronic left-sided foot osteomyelitis with management by general surgery done outpatient had a plan for 6 weeks of antibiotics completed the course  Currently artery disease with history of PCI and stenting to LAD and LCx on Plavix statin and Coreg at home  Diabetes mellitus type 2 currently on sliding scale insulin monitor for hypoglycemia  Hyperlipidemia statin  Chronic pain disorder on home dose of Percocet  Benign essential hypertension on amlodipine carvedilol and hydralazine along with Imdur        Advance Care Planning    10 minutes were spent discussing the patient's resuscitation status, advance care planning, and end of life care with the patient and/or family/surrogate.    The patient and/or family/surrogate voluntarily agreed to participate in ACP services.    Patient's cognitive capacity: Alert and oriented X3    I answered all the patient/family questions that I could within the range and scope of the current medical situation.  We discussed the medical conditions, risks, benefits, outcomes, and goals of care at this time for the patient's medical issues at hand in the face of the patient's chronic issues and current

## 2025-06-20 NOTE — ED NOTES
ED TO INPATIENT SBAR HANDOFF    Patient Name: Yovanny Levine   :  1975  50 y.o.   Preferred Name  Yovanny  Family/Caregiver Present no   Restraints no   C-SSRS: Risk of Suicide: No Risk  Sitter no   Sepsis Risk Score Sepsis V2 Risk Score: 6.6    PLEASE NOTE--Encounter / Re-Admission Within 30 Days  This patient has had another encounter or admission within the last 30 days.      Readmission Risk Score: 39      Situation  Chief Complaint   Patient presents with    Chest Pain     Started in the middle of dialysis. Pt states he was asleep and the chest pain woke him up. Constant 6/10 pain at this time. Hx of stents last heart cath about a month ago.      Brief Description of Patient's Condition: Chest Pain (Started in the middle of dialysis. Pt states he was asleep and the chest pain woke him up. Constant 6/10 pain at this time. Hx of stents last heart cath about a month ago. )   Mental Status: oriented, alert, coherent, logical, thought processes intact, and able to concentrate and follow conversation  Arrived from: home    Imaging:   XR CHEST PORTABLE   Final Result        Abnormal labs:   Abnormal Labs Reviewed   CBC - Abnormal; Notable for the following components:       Result Value    RBC 3.59 (*)     Hemoglobin 11.3 (*)     Hematocrit 33.1 (*)     MCH 31.5 (*)     All other components within normal limits   COMPREHENSIVE METABOLIC PANEL - Abnormal; Notable for the following components:    Sodium 134 (*)     Chloride 92 (*)     BUN 26 (*)     Creatinine 4.0 (*)     Est, Glom Filt Rate 16 (*)     All other components within normal limits   TROPONIN - Abnormal; Notable for the following components:    Troponin, High Sensitivity 172 (*)     All other components within normal limits   TROPONIN - Abnormal; Notable for the following components:    Troponin, High Sensitivity 165 (*)     All other components within normal limits   BRAIN NATRIURETIC PEPTIDE - Abnormal; Notable for the following components:    NT

## 2025-06-21 LAB
ANION GAP SERPL CALCULATED.3IONS-SCNC: 14 MMOL/L (ref 9–17)
BUN SERPL-MCNC: 41 MG/DL (ref 7–20)
CALCIUM SERPL-MCNC: 8.7 MG/DL (ref 8.3–10.6)
CHLORIDE SERPL-SCNC: 97 MMOL/L (ref 99–110)
CO2 SERPL-SCNC: 27 MMOL/L (ref 21–32)
CREAT SERPL-MCNC: 5.7 MG/DL (ref 0.9–1.3)
ERYTHROCYTE [DISTWIDTH] IN BLOOD BY AUTOMATED COUNT: 14 % (ref 11.7–14.9)
EST. AVERAGE GLUCOSE BLD GHB EST-MCNC: 137 MG/DL
GFR, ESTIMATED: 11 ML/MIN/1.73M2
GLUCOSE BLD-MCNC: 128 MG/DL (ref 74–99)
GLUCOSE BLD-MCNC: 147 MG/DL (ref 74–99)
GLUCOSE BLD-MCNC: 303 MG/DL (ref 74–99)
GLUCOSE BLD-MCNC: 371 MG/DL (ref 74–99)
GLUCOSE SERPL-MCNC: 213 MG/DL (ref 74–99)
HBA1C MFR BLD: 6.4 % (ref 4.2–6.3)
HCT VFR BLD AUTO: 32.4 % (ref 42–52)
HGB BLD-MCNC: 10.7 G/DL (ref 13.5–18)
MAGNESIUM SERPL-MCNC: 2.2 MG/DL (ref 1.8–2.4)
MCH RBC QN AUTO: 31.5 PG (ref 27–31)
MCHC RBC AUTO-ENTMCNC: 33 G/DL (ref 32–36)
MCV RBC AUTO: 95.3 FL (ref 78–100)
PHOSPHATE SERPL-MCNC: 6.3 MG/DL (ref 2.5–4.9)
PLATELET # BLD AUTO: 179 K/UL (ref 140–440)
PMV BLD AUTO: 10.2 FL (ref 7.5–11.1)
POTASSIUM SERPL-SCNC: 4.1 MMOL/L (ref 3.5–5.1)
RBC # BLD AUTO: 3.4 M/UL (ref 4.6–6.2)
SODIUM SERPL-SCNC: 138 MMOL/L (ref 136–145)
TROPONIN I SERPL HS-MCNC: 152 NG/L (ref 0–22)
TROPONIN I SERPL HS-MCNC: 155 NG/L (ref 0–22)
TROPONIN I SERPL HS-MCNC: 162 NG/L (ref 0–22)
TROPONIN I SERPL HS-MCNC: 178 NG/L (ref 0–22)
WBC OTHER # BLD: 6.5 K/UL (ref 4–10.5)

## 2025-06-21 PROCEDURE — 82962 GLUCOSE BLOOD TEST: CPT

## 2025-06-21 PROCEDURE — 83735 ASSAY OF MAGNESIUM: CPT

## 2025-06-21 PROCEDURE — 84100 ASSAY OF PHOSPHORUS: CPT

## 2025-06-21 PROCEDURE — 99222 1ST HOSP IP/OBS MODERATE 55: CPT | Performed by: INTERNAL MEDICINE

## 2025-06-21 PROCEDURE — 84484 ASSAY OF TROPONIN QUANT: CPT

## 2025-06-21 PROCEDURE — 94761 N-INVAS EAR/PLS OXIMETRY MLT: CPT

## 2025-06-21 PROCEDURE — 36415 COLL VENOUS BLD VENIPUNCTURE: CPT

## 2025-06-21 PROCEDURE — 6370000000 HC RX 637 (ALT 250 FOR IP): Performed by: STUDENT IN AN ORGANIZED HEALTH CARE EDUCATION/TRAINING PROGRAM

## 2025-06-21 PROCEDURE — 85027 COMPLETE CBC AUTOMATED: CPT

## 2025-06-21 PROCEDURE — 83036 HEMOGLOBIN GLYCOSYLATED A1C: CPT

## 2025-06-21 PROCEDURE — 90935 HEMODIALYSIS ONE EVALUATION: CPT

## 2025-06-21 PROCEDURE — 6370000000 HC RX 637 (ALT 250 FOR IP): Performed by: NURSE PRACTITIONER

## 2025-06-21 PROCEDURE — 2500000003 HC RX 250 WO HCPCS: Performed by: STUDENT IN AN ORGANIZED HEALTH CARE EDUCATION/TRAINING PROGRAM

## 2025-06-21 PROCEDURE — 80048 BASIC METABOLIC PNL TOTAL CA: CPT

## 2025-06-21 PROCEDURE — 6360000002 HC RX W HCPCS: Performed by: STUDENT IN AN ORGANIZED HEALTH CARE EDUCATION/TRAINING PROGRAM

## 2025-06-21 PROCEDURE — 2700000000 HC OXYGEN THERAPY PER DAY

## 2025-06-21 PROCEDURE — 1200000000 HC SEMI PRIVATE

## 2025-06-21 RX ADMIN — CLOPIDOGREL BISULFATE 75 MG: 75 TABLET, FILM COATED ORAL at 16:50

## 2025-06-21 RX ADMIN — OXYCODONE HYDROCHLORIDE AND ACETAMINOPHEN 1 TABLET: 7.5; 325 TABLET ORAL at 07:33

## 2025-06-21 RX ADMIN — INSULIN LISPRO 4 UNITS: 100 INJECTION, SOLUTION INTRAVENOUS; SUBCUTANEOUS at 16:50

## 2025-06-21 RX ADMIN — OXYCODONE HYDROCHLORIDE AND ACETAMINOPHEN 1 TABLET: 7.5; 325 TABLET ORAL at 16:55

## 2025-06-21 RX ADMIN — ROPINIROLE HYDROCHLORIDE 0.25 MG: 0.25 TABLET, FILM COATED ORAL at 22:25

## 2025-06-21 RX ADMIN — FUROSEMIDE 40 MG: 10 INJECTION, SOLUTION INTRAMUSCULAR; INTRAVENOUS at 16:50

## 2025-06-21 RX ADMIN — TRAZODONE HYDROCHLORIDE 150 MG: 50 TABLET ORAL at 22:25

## 2025-06-21 RX ADMIN — INSULIN LISPRO 4 UNITS: 100 INJECTION, SOLUTION INTRAVENOUS; SUBCUTANEOUS at 22:25

## 2025-06-21 RX ADMIN — HYDRALAZINE HYDROCHLORIDE 25 MG: 25 TABLET ORAL at 22:25

## 2025-06-21 RX ADMIN — SODIUM CHLORIDE, PRESERVATIVE FREE 10 ML: 5 INJECTION INTRAVENOUS at 22:41

## 2025-06-21 RX ADMIN — ATORVASTATIN CALCIUM 40 MG: 40 TABLET, FILM COATED ORAL at 22:25

## 2025-06-21 RX ADMIN — HYDRALAZINE HYDROCHLORIDE 25 MG: 25 TABLET ORAL at 16:50

## 2025-06-21 ASSESSMENT — PAIN SCALES - GENERAL
PAINLEVEL_OUTOF10: 8
PAINLEVEL_OUTOF10: 6
PAINLEVEL_OUTOF10: 5
PAINLEVEL_OUTOF10: 9

## 2025-06-21 ASSESSMENT — PAIN DESCRIPTION - LOCATION
LOCATION: BACK
LOCATION: BACK

## 2025-06-21 ASSESSMENT — PAIN SCALES - WONG BAKER
WONGBAKER_NUMERICALRESPONSE: HURTS LITTLE MORE
WONGBAKER_NUMERICALRESPONSE: HURTS LITTLE MORE

## 2025-06-21 ASSESSMENT — PAIN DESCRIPTION - DESCRIPTORS
DESCRIPTORS: ACHING
DESCRIPTORS: ACHING

## 2025-06-21 ASSESSMENT — PAIN DESCRIPTION - ORIENTATION
ORIENTATION: LOWER
ORIENTATION: MID

## 2025-06-21 NOTE — CONSULTS
CARDIOLOGY CONSULT NOTE         Reason for consultation: Chest pain      Primary care physician: José Luis Izquierdo MD      Chief Complaints :  Chief Complaint   Patient presents with    Chest Pain     Started in the middle of dialysis. Pt states he was asleep and the chest pain woke him up. Constant 6/10 pain at this time. Hx of stents last heart cath about a month ago.         History of present illness:Yovanny is a 50 y.o.year old male who has prior history of end-stage renal disease, hypertension, coronary disease status post PCI to LAD with known residual disease in left circumflex and posterior descending artery.  He is now presenting with intermittent chest pain.  He denies any chest pain at present.  He has been compliant with his medication.    Review of Systems:     All systems negative except as marked.        Physical Examination:    Vitals:    06/21/25 1400   BP: (!) 139/95   Pulse: 76   Resp: 16   Temp: 98.2 °F (36.8 °C)   SpO2: 99%        General Appearance:  No distress, conversant    Constitutional:  No acute distress, non-toxic appearance.    HENT:  Normocephalic, Atraumatic,   Eyes:  PERRL, EOMI, Conjunctiva normal, No discharge.   Respiratory:  No respiratory distress, No wheezing  Cardiovascular: S1, S2, no murmurs, gallops. JVD wnl  Abdomen /GI:   Soft, No tenderness   Genitourinary: No costovertebral angle tenderness   Musculoskeletal:  No edema, no tenderness, no deformities.   Integument:  Well hydrated, no rash   Neurologic:  Alert & oriented x 3, no focal deficits noted       Medical decision making and Data review:    Lab Review   Recent Labs     06/21/25  0157   WBC 6.5   HGB 10.7*   HCT 32.4*         Recent Labs     06/21/25  0157      K 4.1   CL 97*   CO2 27   PHOS 6.3*   BUN 41*   CREATININE 5.7*     Recent Labs     06/20/25  1531   AST 18   ALT 13   BILITOT 0.5   ALKPHOS 75     No results for input(s): \"TROPONINT\" in the last 72 hours.    Recent Labs     06/20/25  1531

## 2025-06-21 NOTE — PLAN OF CARE
Problem: Chronic Conditions and Co-morbidities  Goal: Patient's chronic conditions and co-morbidity symptoms are monitored and maintained or improved  Outcome: Progressing  Flowsheets  Taken 6/21/2025 0604  Care Plan - Patient's Chronic Conditions and Co-Morbidity Symptoms are Monitored and Maintained or Improved: Monitor and assess patient's chronic conditions and comorbid symptoms for stability, deterioration, or improvement  Taken 6/20/2025 2019  Care Plan - Patient's Chronic Conditions and Co-Morbidity Symptoms are Monitored and Maintained or Improved: Collaborate with multidisciplinary team to address chronic and comorbid conditions and prevent exacerbation or deterioration     Problem: Discharge Planning  Goal: Discharge to home or other facility with appropriate resources  Outcome: Progressing  Flowsheets (Taken 6/20/2025 2019)  Discharge to home or other facility with appropriate resources: Identify barriers to discharge with patient and caregiver     Problem: Pain  Goal: Verbalizes/displays adequate comfort level or baseline comfort level  Outcome: Progressing     Problem: Safety - Adult  Goal: Free from fall injury  Outcome: Progressing      Detail Level: Zone

## 2025-06-21 NOTE — PLAN OF CARE
Problem: Chronic Conditions and Co-morbidities  Goal: Patient's chronic conditions and co-morbidity symptoms are monitored and maintained or improved  6/21/2025 1818 by Evie Sotelo RN  Outcome: Progressing  Flowsheets (Taken 6/21/2025 0744)  Care Plan - Patient's Chronic Conditions and Co-Morbidity Symptoms are Monitored and Maintained or Improved: Monitor and assess patient's chronic conditions and comorbid symptoms for stability, deterioration, or improvement  6/21/2025 0604 by Roberta Nieves RN  Outcome: Progressing  Flowsheets  Taken 6/21/2025 0604  Care Plan - Patient's Chronic Conditions and Co-Morbidity Symptoms are Monitored and Maintained or Improved: Monitor and assess patient's chronic conditions and comorbid symptoms for stability, deterioration, or improvement  Taken 6/20/2025 2019  Care Plan - Patient's Chronic Conditions and Co-Morbidity Symptoms are Monitored and Maintained or Improved: Collaborate with multidisciplinary team to address chronic and comorbid conditions and prevent exacerbation or deterioration     Problem: Discharge Planning  Goal: Discharge to home or other facility with appropriate resources  6/21/2025 1818 by Evie Sotelo RN  Outcome: Progressing  6/21/2025 0604 by Roberta Nieves RN  Outcome: Progressing  Flowsheets (Taken 6/20/2025 2019)  Discharge to home or other facility with appropriate resources: Identify barriers to discharge with patient and caregiver     Problem: Pain  Goal: Verbalizes/displays adequate comfort level or baseline comfort level  6/21/2025 1818 by Evie Sotelo RN  Outcome: Progressing  6/21/2025 0604 by Roberta Nieves RN  Outcome: Progressing     Problem: Safety - Adult  Goal: Free from fall injury  6/21/2025 1818 by Evie Sotelo RN  Outcome: Progressing  6/21/2025 0604 by Roberta Nieves RN  Outcome: Progressing
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
## 2025-06-22 LAB
ANION GAP SERPL CALCULATED.3IONS-SCNC: 16 MMOL/L (ref 9–17)
BUN SERPL-MCNC: 32 MG/DL (ref 7–20)
CALCIUM SERPL-MCNC: 8.6 MG/DL (ref 8.3–10.6)
CHLORIDE SERPL-SCNC: 96 MMOL/L (ref 99–110)
CO2 SERPL-SCNC: 25 MMOL/L (ref 21–32)
CREAT SERPL-MCNC: 5.1 MG/DL (ref 0.9–1.3)
ERYTHROCYTE [DISTWIDTH] IN BLOOD BY AUTOMATED COUNT: 14.2 % (ref 11.7–14.9)
GFR, ESTIMATED: 12 ML/MIN/1.73M2
GLUCOSE BLD-MCNC: 143 MG/DL (ref 74–99)
GLUCOSE BLD-MCNC: 168 MG/DL (ref 74–99)
GLUCOSE BLD-MCNC: 176 MG/DL (ref 74–99)
GLUCOSE BLD-MCNC: 197 MG/DL (ref 74–99)
GLUCOSE BLD-MCNC: 202 MG/DL (ref 74–99)
GLUCOSE SERPL-MCNC: 200 MG/DL (ref 74–99)
HCT VFR BLD AUTO: 31.5 % (ref 42–52)
HGB BLD-MCNC: 10.8 G/DL (ref 13.5–18)
MAGNESIUM SERPL-MCNC: 2.2 MG/DL (ref 1.8–2.4)
MCH RBC QN AUTO: 32.8 PG (ref 27–31)
MCHC RBC AUTO-ENTMCNC: 34.3 G/DL (ref 32–36)
MCV RBC AUTO: 95.7 FL (ref 78–100)
PHOSPHATE SERPL-MCNC: 5.9 MG/DL (ref 2.5–4.9)
PLATELET # BLD AUTO: 213 K/UL (ref 140–440)
PMV BLD AUTO: 10.7 FL (ref 7.5–11.1)
POTASSIUM SERPL-SCNC: 3.8 MMOL/L (ref 3.5–5.1)
RBC # BLD AUTO: 3.29 M/UL (ref 4.6–6.2)
SODIUM SERPL-SCNC: 137 MMOL/L (ref 136–145)
TROPONIN I SERPL HS-MCNC: 149 NG/L (ref 0–22)
TROPONIN I SERPL HS-MCNC: 150 NG/L (ref 0–22)
TROPONIN I SERPL HS-MCNC: 153 NG/L (ref 0–22)
TROPONIN I SERPL HS-MCNC: 156 NG/L (ref 0–22)
WBC OTHER # BLD: 7.1 K/UL (ref 4–10.5)

## 2025-06-22 PROCEDURE — 36415 COLL VENOUS BLD VENIPUNCTURE: CPT

## 2025-06-22 PROCEDURE — 80048 BASIC METABOLIC PNL TOTAL CA: CPT

## 2025-06-22 PROCEDURE — 84484 ASSAY OF TROPONIN QUANT: CPT

## 2025-06-22 PROCEDURE — 6360000002 HC RX W HCPCS: Performed by: STUDENT IN AN ORGANIZED HEALTH CARE EDUCATION/TRAINING PROGRAM

## 2025-06-22 PROCEDURE — 6370000000 HC RX 637 (ALT 250 FOR IP): Performed by: INTERNAL MEDICINE

## 2025-06-22 PROCEDURE — 1200000000 HC SEMI PRIVATE

## 2025-06-22 PROCEDURE — 6370000000 HC RX 637 (ALT 250 FOR IP): Performed by: STUDENT IN AN ORGANIZED HEALTH CARE EDUCATION/TRAINING PROGRAM

## 2025-06-22 PROCEDURE — 2700000000 HC OXYGEN THERAPY PER DAY

## 2025-06-22 PROCEDURE — 2500000003 HC RX 250 WO HCPCS: Performed by: STUDENT IN AN ORGANIZED HEALTH CARE EDUCATION/TRAINING PROGRAM

## 2025-06-22 PROCEDURE — 94761 N-INVAS EAR/PLS OXIMETRY MLT: CPT

## 2025-06-22 PROCEDURE — 6370000000 HC RX 637 (ALT 250 FOR IP): Performed by: NURSE PRACTITIONER

## 2025-06-22 PROCEDURE — 82962 GLUCOSE BLOOD TEST: CPT

## 2025-06-22 PROCEDURE — 85027 COMPLETE CBC AUTOMATED: CPT

## 2025-06-22 PROCEDURE — 83735 ASSAY OF MAGNESIUM: CPT

## 2025-06-22 PROCEDURE — 84100 ASSAY OF PHOSPHORUS: CPT

## 2025-06-22 RX ORDER — HYDRALAZINE HYDROCHLORIDE 50 MG/1
50 TABLET, FILM COATED ORAL 2 TIMES DAILY
Status: DISCONTINUED | OUTPATIENT
Start: 2025-06-22 | End: 2025-06-25 | Stop reason: HOSPADM

## 2025-06-22 RX ADMIN — FUROSEMIDE 40 MG: 10 INJECTION, SOLUTION INTRAMUSCULAR; INTRAVENOUS at 17:15

## 2025-06-22 RX ADMIN — TRAZODONE HYDROCHLORIDE 150 MG: 50 TABLET ORAL at 20:13

## 2025-06-22 RX ADMIN — HYDRALAZINE HYDROCHLORIDE 50 MG: 50 TABLET ORAL at 09:15

## 2025-06-22 RX ADMIN — SODIUM CHLORIDE, PRESERVATIVE FREE 10 ML: 5 INJECTION INTRAVENOUS at 09:14

## 2025-06-22 RX ADMIN — OXYCODONE HYDROCHLORIDE AND ACETAMINOPHEN 1 TABLET: 7.5; 325 TABLET ORAL at 09:15

## 2025-06-22 RX ADMIN — HYDRALAZINE HYDROCHLORIDE 50 MG: 50 TABLET ORAL at 20:14

## 2025-06-22 RX ADMIN — SODIUM CHLORIDE, PRESERVATIVE FREE 10 ML: 5 INJECTION INTRAVENOUS at 20:14

## 2025-06-22 RX ADMIN — FUROSEMIDE 40 MG: 10 INJECTION, SOLUTION INTRAMUSCULAR; INTRAVENOUS at 09:14

## 2025-06-22 RX ADMIN — CLOPIDOGREL BISULFATE 75 MG: 75 TABLET, FILM COATED ORAL at 09:15

## 2025-06-22 RX ADMIN — INSULIN LISPRO 1 UNITS: 100 INJECTION, SOLUTION INTRAVENOUS; SUBCUTANEOUS at 17:15

## 2025-06-22 RX ADMIN — OXYCODONE HYDROCHLORIDE AND ACETAMINOPHEN 1 TABLET: 7.5; 325 TABLET ORAL at 17:20

## 2025-06-22 RX ADMIN — ASPIRIN 81 MG: 81 TABLET, CHEWABLE ORAL at 09:15

## 2025-06-22 RX ADMIN — ROPINIROLE HYDROCHLORIDE 0.25 MG: 0.25 TABLET, FILM COATED ORAL at 20:14

## 2025-06-22 RX ADMIN — ENOXAPARIN SODIUM 30 MG: 100 INJECTION SUBCUTANEOUS at 09:15

## 2025-06-22 RX ADMIN — ATORVASTATIN CALCIUM 40 MG: 40 TABLET, FILM COATED ORAL at 20:14

## 2025-06-22 RX ADMIN — PANTOPRAZOLE SODIUM 40 MG: 40 TABLET, DELAYED RELEASE ORAL at 09:15

## 2025-06-22 ASSESSMENT — PAIN DESCRIPTION - LOCATION
LOCATION: BACK;FOOT

## 2025-06-22 ASSESSMENT — PAIN DESCRIPTION - ONSET
ONSET: ON-GOING
ONSET: ON-GOING

## 2025-06-22 ASSESSMENT — PAIN DESCRIPTION - FREQUENCY
FREQUENCY: CONTINUOUS
FREQUENCY: CONTINUOUS

## 2025-06-22 ASSESSMENT — PAIN DESCRIPTION - ORIENTATION
ORIENTATION: RIGHT;LEFT
ORIENTATION: LEFT;RIGHT

## 2025-06-22 ASSESSMENT — PAIN DESCRIPTION - DESCRIPTORS
DESCRIPTORS: ACHING
DESCRIPTORS: ACHING

## 2025-06-22 ASSESSMENT — PAIN SCALES - GENERAL
PAINLEVEL_OUTOF10: 7
PAINLEVEL_OUTOF10: 6
PAINLEVEL_OUTOF10: 6

## 2025-06-22 ASSESSMENT — PAIN DESCRIPTION - PAIN TYPE
TYPE: CHRONIC PAIN
TYPE: CHRONIC PAIN

## 2025-06-22 NOTE — PLAN OF CARE
Problem: Chronic Conditions and Co-morbidities  Goal: Patient's chronic conditions and co-morbidity symptoms are monitored and maintained or improved  6/22/2025 1027 by Shelly Atwood RN  Outcome: Progressing  6/22/2025 0500 by Roberta Nieves RN  Outcome: Progressing  Flowsheets (Taken 6/21/2025 1920)  Care Plan - Patient's Chronic Conditions and Co-Morbidity Symptoms are Monitored and Maintained or Improved: Monitor and assess patient's chronic conditions and comorbid symptoms for stability, deterioration, or improvement     Problem: Discharge Planning  Goal: Discharge to home or other facility with appropriate resources  6/22/2025 1027 by Shelly Atwood, RN  Outcome: Progressing  6/22/2025 0500 by Roberta Nieves RN  Outcome: Progressing  Flowsheets (Taken 6/21/2025 1920)  Discharge to home or other facility with appropriate resources: Identify barriers to discharge with patient and caregiver     Problem: Pain  Goal: Verbalizes/displays adequate comfort level or baseline comfort level  6/22/2025 1027 by Shelly Atwood RN  Outcome: Progressing  6/22/2025 0500 by Roberta Nieves RN  Outcome: Progressing     Problem: Safety - Adult  Goal: Free from fall injury  6/22/2025 1027 by Shelly Atwood RN  Outcome: Progressing  6/22/2025 0500 by Roberta Nieves RN  Outcome: Progressing

## 2025-06-22 NOTE — PLAN OF CARE
Problem: Chronic Conditions and Co-morbidities  Goal: Patient's chronic conditions and co-morbidity symptoms are monitored and maintained or improved  6/22/2025 0500 by Roberta Nieves RN  Outcome: Progressing  Flowsheets (Taken 6/21/2025 1920)  Care Plan - Patient's Chronic Conditions and Co-Morbidity Symptoms are Monitored and Maintained or Improved: Monitor and assess patient's chronic conditions and comorbid symptoms for stability, deterioration, or improvement  6/21/2025 1818 by Evie Sotelo RN  Outcome: Progressing  Flowsheets (Taken 6/21/2025 0744)  Care Plan - Patient's Chronic Conditions and Co-Morbidity Symptoms are Monitored and Maintained or Improved: Monitor and assess patient's chronic conditions and comorbid symptoms for stability, deterioration, or improvement     Problem: Discharge Planning  Goal: Discharge to home or other facility with appropriate resources  6/22/2025 0500 by Roberta Nieves RN  Outcome: Progressing  Flowsheets (Taken 6/21/2025 1920)  Discharge to home or other facility with appropriate resources: Identify barriers to discharge with patient and caregiver  6/21/2025 1818 by Evie Sotelo RN  Outcome: Progressing     Problem: Pain  Goal: Verbalizes/displays adequate comfort level or baseline comfort level  6/22/2025 0500 by Roberta Nieves RN  Outcome: Progressing  6/21/2025 1818 by Evie Sotelo RN  Outcome: Progressing     Problem: Safety - Adult  Goal: Free from fall injury  6/22/2025 0500 by Roberta Nieves RN  Outcome: Progressing  6/21/2025 1818 by Evie Sotelo RN  Outcome: Progressing

## 2025-06-23 ENCOUNTER — HOSPITAL ENCOUNTER (INPATIENT)
Dept: NON INVASIVE DIAGNOSTICS | Age: 50
Discharge: HOME | DRG: 321 | End: 2025-06-25
Payer: MEDICARE

## 2025-06-23 ENCOUNTER — APPOINTMENT (OUTPATIENT)
Dept: NUCLEAR MEDICINE | Age: 50
DRG: 321 | End: 2025-06-23
Payer: MEDICARE

## 2025-06-23 ENCOUNTER — APPOINTMENT (OUTPATIENT)
Dept: NON INVASIVE DIAGNOSTICS | Age: 50
DRG: 321 | End: 2025-06-23
Attending: INTERNAL MEDICINE
Payer: MEDICARE

## 2025-06-23 LAB
ANION GAP SERPL CALCULATED.3IONS-SCNC: 17 MMOL/L (ref 9–17)
BUN SERPL-MCNC: 57 MG/DL (ref 7–20)
CALCIUM SERPL-MCNC: 8.6 MG/DL (ref 8.3–10.6)
CHLORIDE SERPL-SCNC: 97 MMOL/L (ref 99–110)
CO2 SERPL-SCNC: 23 MMOL/L (ref 21–32)
CREAT SERPL-MCNC: 7.8 MG/DL (ref 0.9–1.3)
ECHO AO ROOT DIAM: 2.8 CM
ECHO AO ROOT INDEX: 1.23 CM/M2
ECHO AV AREA PEAK VELOCITY: 2.5 CM2
ECHO AV AREA VTI: 2.7 CM2
ECHO AV AREA/BSA PEAK VELOCITY: 1.1 CM2/M2
ECHO AV AREA/BSA VTI: 1.2 CM2/M2
ECHO AV MEAN GRADIENT: 5 MMHG
ECHO AV MEAN VELOCITY: 1.1 M/S
ECHO AV PEAK GRADIENT: 10 MMHG
ECHO AV PEAK VELOCITY: 1.6 M/S
ECHO AV VELOCITY RATIO: 0.63
ECHO AV VTI: 29 CM
ECHO BSA: 2.36 M2
ECHO EST RA PRESSURE: 3 MMHG
ECHO IVC PROX: 1.8 CM
ECHO LA DIAMETER INDEX: 2.06 CM/M2
ECHO LA DIAMETER: 4.7 CM
ECHO LA TO AORTIC ROOT RATIO: 1.68
ECHO LV E' LATERAL VELOCITY: 12.3 CM/S
ECHO LV E' SEPTAL VELOCITY: 6.2 CM/S
ECHO LV EDV A4C: 133 ML
ECHO LV EDV INDEX A4C: 58 ML/M2
ECHO LV EF PHYSICIAN: 55 %
ECHO LV EJECTION FRACTION A4C: 53 %
ECHO LV ESV A4C: 62 ML
ECHO LV ESV INDEX A4C: 27 ML/M2
ECHO LV FRACTIONAL SHORTENING: 35 % (ref 28–44)
ECHO LV INTERNAL DIMENSION DIASTOLE INDEX: 2.28 CM/M2
ECHO LV INTERNAL DIMENSION DIASTOLIC: 5.2 CM (ref 4.2–5.9)
ECHO LV INTERNAL DIMENSION SYSTOLIC INDEX: 1.49 CM/M2
ECHO LV INTERNAL DIMENSION SYSTOLIC: 3.4 CM
ECHO LV IVSD: 1.2 CM (ref 0.6–1)
ECHO LV MASS 2D: 309.6 G (ref 88–224)
ECHO LV MASS INDEX 2D: 135.8 G/M2 (ref 49–115)
ECHO LV POSTERIOR WALL DIASTOLIC: 1.6 CM (ref 0.6–1)
ECHO LV RELATIVE WALL THICKNESS RATIO: 0.62
ECHO LVOT AREA: 3.8 CM2
ECHO LVOT AV VTI INDEX: 0.72
ECHO LVOT DIAM: 2.2 CM
ECHO LVOT MEAN GRADIENT: 2 MMHG
ECHO LVOT PEAK GRADIENT: 4 MMHG
ECHO LVOT PEAK VELOCITY: 1 M/S
ECHO LVOT STROKE VOLUME INDEX: 34.8 ML/M2
ECHO LVOT SV: 79.4 ML
ECHO LVOT VTI: 20.9 CM
ECHO MV A VELOCITY: 0.63 M/S
ECHO MV E DECELERATION TIME (DT): 148 MS
ECHO MV E VELOCITY: 1.19 M/S
ECHO MV E/A RATIO: 1.89
ECHO MV E/E' LATERAL: 9.67
ECHO MV E/E' RATIO (AVERAGED): 14.43
ECHO MV E/E' SEPTAL: 19.19
ECHO RIGHT VENTRICULAR SYSTOLIC PRESSURE (RVSP): 12 MMHG
ECHO RV MID DIMENSION: 3.8 CM
ECHO TV REGURGITANT MAX VELOCITY: 1.46 M/S
ECHO TV REGURGITANT PEAK GRADIENT: 9 MMHG
EKG ATRIAL RATE: 78 BPM
EKG DIAGNOSIS: NORMAL
EKG P AXIS: 35 DEGREES
EKG P-R INTERVAL: 208 MS
EKG Q-T INTERVAL: 436 MS
EKG QRS DURATION: 92 MS
EKG QTC CALCULATION (BAZETT): 497 MS
EKG R AXIS: 49 DEGREES
EKG T AXIS: 80 DEGREES
EKG VENTRICULAR RATE: 78 BPM
GFR, ESTIMATED: 7 ML/MIN/1.73M2
GLUCOSE BLD-MCNC: 128 MG/DL (ref 74–99)
GLUCOSE BLD-MCNC: 145 MG/DL (ref 74–99)
GLUCOSE BLD-MCNC: 224 MG/DL (ref 74–99)
GLUCOSE SERPL-MCNC: 187 MG/DL (ref 74–99)
POTASSIUM SERPL-SCNC: 4.5 MMOL/L (ref 3.5–5.1)
SODIUM SERPL-SCNC: 137 MMOL/L (ref 136–145)
TROPONIN I SERPL HS-MCNC: 141 NG/L (ref 0–22)

## 2025-06-23 PROCEDURE — 93017 CV STRESS TEST TRACING ONLY: CPT

## 2025-06-23 PROCEDURE — 6370000000 HC RX 637 (ALT 250 FOR IP): Performed by: INTERNAL MEDICINE

## 2025-06-23 PROCEDURE — 1200000000 HC SEMI PRIVATE

## 2025-06-23 PROCEDURE — 6360000002 HC RX W HCPCS: Performed by: INTERNAL MEDICINE

## 2025-06-23 PROCEDURE — 6360000002 HC RX W HCPCS: Performed by: NURSE PRACTITIONER

## 2025-06-23 PROCEDURE — 6360000002 HC RX W HCPCS: Performed by: STUDENT IN AN ORGANIZED HEALTH CARE EDUCATION/TRAINING PROGRAM

## 2025-06-23 PROCEDURE — 78452 HT MUSCLE IMAGE SPECT MULT: CPT

## 2025-06-23 PROCEDURE — A9500 TC99M SESTAMIBI: HCPCS | Performed by: INTERNAL MEDICINE

## 2025-06-23 PROCEDURE — 82962 GLUCOSE BLOOD TEST: CPT

## 2025-06-23 PROCEDURE — 6370000000 HC RX 637 (ALT 250 FOR IP): Performed by: NURSE PRACTITIONER

## 2025-06-23 PROCEDURE — 93010 ELECTROCARDIOGRAM REPORT: CPT | Performed by: INTERNAL MEDICINE

## 2025-06-23 PROCEDURE — 2500000003 HC RX 250 WO HCPCS: Performed by: STUDENT IN AN ORGANIZED HEALTH CARE EDUCATION/TRAINING PROGRAM

## 2025-06-23 PROCEDURE — 93306 TTE W/DOPPLER COMPLETE: CPT

## 2025-06-23 PROCEDURE — 99232 SBSQ HOSP IP/OBS MODERATE 35: CPT | Performed by: INTERNAL MEDICINE

## 2025-06-23 PROCEDURE — 36415 COLL VENOUS BLD VENIPUNCTURE: CPT

## 2025-06-23 PROCEDURE — 6370000000 HC RX 637 (ALT 250 FOR IP): Performed by: STUDENT IN AN ORGANIZED HEALTH CARE EDUCATION/TRAINING PROGRAM

## 2025-06-23 PROCEDURE — APPNB15 APP NON BILLABLE TIME 0-15 MINS

## 2025-06-23 PROCEDURE — 80048 BASIC METABOLIC PNL TOTAL CA: CPT

## 2025-06-23 PROCEDURE — 90935 HEMODIALYSIS ONE EVALUATION: CPT

## 2025-06-23 PROCEDURE — 3430000000 HC RX DIAGNOSTIC RADIOPHARMACEUTICAL: Performed by: INTERNAL MEDICINE

## 2025-06-23 PROCEDURE — 94761 N-INVAS EAR/PLS OXIMETRY MLT: CPT

## 2025-06-23 PROCEDURE — 93306 TTE W/DOPPLER COMPLETE: CPT | Performed by: INTERNAL MEDICINE

## 2025-06-23 PROCEDURE — 6370000000 HC RX 637 (ALT 250 FOR IP)

## 2025-06-23 PROCEDURE — 84484 ASSAY OF TROPONIN QUANT: CPT

## 2025-06-23 RX ORDER — REGADENOSON 0.08 MG/ML
0.4 INJECTION, SOLUTION INTRAVENOUS
Status: COMPLETED | OUTPATIENT
Start: 2025-06-23 | End: 2025-06-23

## 2025-06-23 RX ORDER — METOPROLOL TARTRATE 25 MG/1
12.5 TABLET, FILM COATED ORAL 2 TIMES DAILY
Status: DISCONTINUED | OUTPATIENT
Start: 2025-06-23 | End: 2025-06-25 | Stop reason: HOSPADM

## 2025-06-23 RX ORDER — LABETALOL HYDROCHLORIDE 5 MG/ML
10 INJECTION, SOLUTION INTRAVENOUS
Status: DISCONTINUED | OUTPATIENT
Start: 2025-06-23 | End: 2025-06-25 | Stop reason: HOSPADM

## 2025-06-23 RX ORDER — HYDRALAZINE HYDROCHLORIDE 20 MG/ML
10 INJECTION INTRAMUSCULAR; INTRAVENOUS
Status: DISCONTINUED | OUTPATIENT
Start: 2025-06-23 | End: 2025-06-25 | Stop reason: HOSPADM

## 2025-06-23 RX ORDER — TETRAKIS(2-METHOXYISOBUTYLISOCYANIDE)COPPER(I) TETRAFLUOROBORATE 1 MG/ML
11 INJECTION, POWDER, LYOPHILIZED, FOR SOLUTION INTRAVENOUS
Status: COMPLETED | OUTPATIENT
Start: 2025-06-23 | End: 2025-06-23

## 2025-06-23 RX ORDER — ISOSORBIDE MONONITRATE 30 MG/1
30 TABLET, EXTENDED RELEASE ORAL DAILY
Status: DISCONTINUED | OUTPATIENT
Start: 2025-06-23 | End: 2025-06-25 | Stop reason: HOSPADM

## 2025-06-23 RX ORDER — HEPARIN SODIUM 1000 [USP'U]/ML
4000 INJECTION, SOLUTION INTRAVENOUS; SUBCUTANEOUS
Status: COMPLETED | OUTPATIENT
Start: 2025-06-23 | End: 2025-06-23

## 2025-06-23 RX ORDER — TETRAKIS(2-METHOXYISOBUTYLISOCYANIDE)COPPER(I) TETRAFLUOROBORATE 1 MG/ML
30.7 INJECTION, POWDER, LYOPHILIZED, FOR SOLUTION INTRAVENOUS
Status: COMPLETED | OUTPATIENT
Start: 2025-06-23 | End: 2025-06-23

## 2025-06-23 RX ORDER — CALCIUM CARBONATE 500 MG/1
500 TABLET, CHEWABLE ORAL 3 TIMES DAILY PRN
Status: DISCONTINUED | OUTPATIENT
Start: 2025-06-23 | End: 2025-06-25 | Stop reason: HOSPADM

## 2025-06-23 RX ADMIN — KIT FOR THE PREPARATION OF TECHNETIUM TC99M SESTAMIBI 30.7 MILLICURIE: 1 INJECTION, POWDER, LYOPHILIZED, FOR SOLUTION PARENTERAL at 12:15

## 2025-06-23 RX ADMIN — HYDRALAZINE HYDROCHLORIDE 50 MG: 50 TABLET ORAL at 20:58

## 2025-06-23 RX ADMIN — HYDRALAZINE HYDROCHLORIDE 10 MG: 20 INJECTION INTRAMUSCULAR; INTRAVENOUS at 16:52

## 2025-06-23 RX ADMIN — FUROSEMIDE 40 MG: 10 INJECTION, SOLUTION INTRAMUSCULAR; INTRAVENOUS at 16:53

## 2025-06-23 RX ADMIN — OXYCODONE HYDROCHLORIDE AND ACETAMINOPHEN 1 TABLET: 7.5; 325 TABLET ORAL at 02:25

## 2025-06-23 RX ADMIN — CALCIUM CARBONATE 500 MG: 500 TABLET, CHEWABLE ORAL at 03:08

## 2025-06-23 RX ADMIN — METOPROLOL TARTRATE 12.5 MG: 25 TABLET, FILM COATED ORAL at 20:58

## 2025-06-23 RX ADMIN — ATORVASTATIN CALCIUM 40 MG: 40 TABLET, FILM COATED ORAL at 20:58

## 2025-06-23 RX ADMIN — SODIUM CHLORIDE, PRESERVATIVE FREE 10 ML: 5 INJECTION INTRAVENOUS at 08:04

## 2025-06-23 RX ADMIN — TRAZODONE HYDROCHLORIDE 150 MG: 50 TABLET ORAL at 20:58

## 2025-06-23 RX ADMIN — REGADENOSON 0.4 MG: 0.08 INJECTION, SOLUTION INTRAVENOUS at 10:50

## 2025-06-23 RX ADMIN — HYDRALAZINE HYDROCHLORIDE 10 MG: 20 INJECTION INTRAMUSCULAR; INTRAVENOUS at 08:06

## 2025-06-23 RX ADMIN — ONDANSETRON 4 MG: 2 INJECTION INTRAMUSCULAR; INTRAVENOUS at 02:31

## 2025-06-23 RX ADMIN — ROPINIROLE HYDROCHLORIDE 0.25 MG: 0.25 TABLET, FILM COATED ORAL at 20:58

## 2025-06-23 RX ADMIN — INSULIN LISPRO 1 UNITS: 100 INJECTION, SOLUTION INTRAVENOUS; SUBCUTANEOUS at 20:58

## 2025-06-23 RX ADMIN — HEPARIN SODIUM 4000 UNITS: 1000 INJECTION INTRAVENOUS; SUBCUTANEOUS at 13:05

## 2025-06-23 RX ADMIN — ONDANSETRON 4 MG: 2 INJECTION INTRAMUSCULAR; INTRAVENOUS at 14:49

## 2025-06-23 RX ADMIN — HYDRALAZINE HYDROCHLORIDE 10 MG: 20 INJECTION INTRAMUSCULAR; INTRAVENOUS at 03:07

## 2025-06-23 RX ADMIN — SODIUM CHLORIDE, PRESERVATIVE FREE 10 ML: 5 INJECTION INTRAVENOUS at 20:59

## 2025-06-23 RX ADMIN — KIT FOR THE PREPARATION OF TECHNETIUM TC99M SESTAMIBI 11 MILLICURIE: 1 INJECTION, POWDER, LYOPHILIZED, FOR SOLUTION PARENTERAL at 12:15

## 2025-06-23 RX ADMIN — OXYCODONE HYDROCHLORIDE AND ACETAMINOPHEN 1 TABLET: 7.5; 325 TABLET ORAL at 15:43

## 2025-06-23 ASSESSMENT — PAIN - FUNCTIONAL ASSESSMENT: PAIN_FUNCTIONAL_ASSESSMENT: ACTIVITIES ARE NOT PREVENTED

## 2025-06-23 ASSESSMENT — PAIN DESCRIPTION - PAIN TYPE: TYPE: CHRONIC PAIN

## 2025-06-23 ASSESSMENT — PAIN DESCRIPTION - ORIENTATION
ORIENTATION: RIGHT;LEFT;MID;ANTERIOR
ORIENTATION: RIGHT;LEFT

## 2025-06-23 ASSESSMENT — PAIN SCALES - GENERAL
PAINLEVEL_OUTOF10: 0
PAINLEVEL_OUTOF10: 7
PAINLEVEL_OUTOF10: 8
PAINLEVEL_OUTOF10: 0

## 2025-06-23 ASSESSMENT — PAIN DESCRIPTION - DESCRIPTORS: DESCRIPTORS: DISCOMFORT

## 2025-06-23 ASSESSMENT — PAIN SCALES - WONG BAKER: WONGBAKER_NUMERICALRESPONSE: NO HURT

## 2025-06-23 ASSESSMENT — PAIN DESCRIPTION - LOCATION
LOCATION: BACK;FOOT
LOCATION: FOOT;BACK

## 2025-06-23 ASSESSMENT — PAIN DESCRIPTION - FREQUENCY: FREQUENCY: CONTINUOUS

## 2025-06-23 ASSESSMENT — PAIN DESCRIPTION - ONSET: ONSET: ON-GOING

## 2025-06-23 NOTE — PLAN OF CARE
Problem: Chronic Conditions and Co-morbidities  Goal: Patient's chronic conditions and co-morbidity symptoms are monitored and maintained or improved  6/23/2025 1137 by Fallon Santana RN  Outcome: Progressing  6/23/2025 0345 by Angela Eaton RN  Outcome: Progressing     Problem: Discharge Planning  Goal: Discharge to home or other facility with appropriate resources  6/23/2025 1137 by Fallon Santana RN  Outcome: Progressing  6/23/2025 0345 by Angela Eaton RN  Outcome: Progressing     Problem: Pain  Goal: Verbalizes/displays adequate comfort level or baseline comfort level  Outcome: Progressing     Problem: Safety - Adult  Goal: Free from fall injury  Outcome: Progressing

## 2025-06-23 NOTE — CARE COORDINATION
Chart reviewed. Cm attempted to see pt at bedside, pt YASIR at dialysis at this time. Pt is INDP in the room, no anticipated discharge needs. Cm to attempt again later.

## 2025-06-23 NOTE — CARE COORDINATION
Chart reviewed. Cm in to see pt at bedside, introduced self and role of case management. Pt is from home with 16 year old son. Pt lives in a 1 story house with 5 NOLAN. Pt has a tub/shower with a SC. Pt uses a cane. Pt does not drive, but stated that he has regular transportation. Pt has PCP and insurance to assist with medical expenses. Pt is INDP in the room. Pt denies any discharge needs. No other questions or needs stated at this time. Cm to follow.     Cm completed Readmission Assessment with pt.    06/23/25 8973   Service Assessment   Patient Orientation Alert and Oriented   Cognition Alert   History Provided By Patient;Medical Record   Primary Caregiver Self   Accompanied By/Relationship N/A   Support Systems Children   Patient's Healthcare Decision Maker is: Named in Scanned ACP Document   PCP Verified by CM Yes   Last Visit to PCP Within last 6 months   Prior Functional Level Independent in ADLs/IADLs   Current Functional Level Independent in ADLs/IADLs   Can patient return to prior living arrangement Yes   Ability to make needs known: Good   Family able to assist with home care needs: Yes   Would you like for me to discuss the discharge plan with any other family members/significant others, and if so, who? Yes  (Legal next of kin and family as needed)   Financial Resources Medicare;Medicaid   Community Resources None   CM/SW Referral Other (see comment)  (Discharge planning assessment)   Condition of Participation: Discharge Planning   The Patient and/or Patient Representative was provided with a Choice of Provider? Patient   The Patient and/Or Patient Representative agree with the Discharge Plan? Yes   Freedom of Choice list was provided with basic dialogue that supports the patient's individualized plan of care/goals, treatment preferences, and shares the quality data associated with the providers?  Yes

## 2025-06-24 LAB
ANION GAP SERPL CALCULATED.3IONS-SCNC: 18 MMOL/L (ref 9–17)
BUN SERPL-MCNC: 54 MG/DL (ref 7–20)
CALCIUM SERPL-MCNC: 8.4 MG/DL (ref 8.3–10.6)
CHLORIDE SERPL-SCNC: 98 MMOL/L (ref 99–110)
CO2 SERPL-SCNC: 21 MMOL/L (ref 21–32)
CREAT SERPL-MCNC: 7 MG/DL (ref 0.9–1.3)
ECHO BSA: 2.36 M2
ECHO BSA: 2.36 M2
GFR, ESTIMATED: 8 ML/MIN/1.73M2
GLUCOSE BLD-MCNC: 134 MG/DL (ref 74–99)
GLUCOSE BLD-MCNC: 145 MG/DL (ref 74–99)
GLUCOSE BLD-MCNC: 153 MG/DL (ref 74–99)
GLUCOSE BLD-MCNC: 163 MG/DL (ref 74–99)
GLUCOSE SERPL-MCNC: 163 MG/DL (ref 74–99)
NUC STRESS EJECTION FRACTION: 44 %
PA ADP PRP-ACNC: 250 PRU
POTASSIUM SERPL-SCNC: 4.2 MMOL/L (ref 3.5–5.1)
SODIUM SERPL-SCNC: 136 MMOL/L (ref 136–145)
STRESS BASELINE DIAS BP: 93 MMHG
STRESS BASELINE HR: 83 BPM
STRESS BASELINE SYS BP: 166 MMHG
STRESS ESTIMATED WORKLOAD: 1 METS
STRESS PEAK DIAS BP: 93 MMHG
STRESS PEAK SYS BP: 166 MMHG
STRESS PERCENT HR ACHIEVED: 54 %
STRESS POST PEAK HR: 92 BPM
STRESS RATE PRESSURE PRODUCT: NORMAL BPM*MMHG
STRESS TARGET HR: 170 BPM
TID: 0.99

## 2025-06-24 PROCEDURE — 82962 GLUCOSE BLOOD TEST: CPT

## 2025-06-24 PROCEDURE — 92928 PRQ TCAT PLMT NTRAC ST 1 LES: CPT | Performed by: INTERNAL MEDICINE

## 2025-06-24 PROCEDURE — 2500000003 HC RX 250 WO HCPCS: Performed by: INTERNAL MEDICINE

## 2025-06-24 PROCEDURE — C1725 CATH, TRANSLUMIN NON-LASER: HCPCS | Performed by: INTERNAL MEDICINE

## 2025-06-24 PROCEDURE — 6370000000 HC RX 637 (ALT 250 FOR IP): Performed by: NURSE PRACTITIONER

## 2025-06-24 PROCEDURE — C1769 GUIDE WIRE: HCPCS | Performed by: INTERNAL MEDICINE

## 2025-06-24 PROCEDURE — 6370000000 HC RX 637 (ALT 250 FOR IP): Performed by: INTERNAL MEDICINE

## 2025-06-24 PROCEDURE — 93018 CV STRESS TEST I&R ONLY: CPT | Performed by: INTERNAL MEDICINE

## 2025-06-24 PROCEDURE — 6370000000 HC RX 637 (ALT 250 FOR IP): Performed by: STUDENT IN AN ORGANIZED HEALTH CARE EDUCATION/TRAINING PROGRAM

## 2025-06-24 PROCEDURE — 6360000004 HC RX CONTRAST MEDICATION: Performed by: INTERNAL MEDICINE

## 2025-06-24 PROCEDURE — 99232 SBSQ HOSP IP/OBS MODERATE 35: CPT | Performed by: INTERNAL MEDICINE

## 2025-06-24 PROCEDURE — 93454 CORONARY ARTERY ANGIO S&I: CPT | Performed by: INTERNAL MEDICINE

## 2025-06-24 PROCEDURE — 80048 BASIC METABOLIC PNL TOTAL CA: CPT

## 2025-06-24 PROCEDURE — 2709999900 HC NON-CHARGEABLE SUPPLY: Performed by: INTERNAL MEDICINE

## 2025-06-24 PROCEDURE — 78452 HT MUSCLE IMAGE SPECT MULT: CPT | Performed by: INTERNAL MEDICINE

## 2025-06-24 PROCEDURE — 85347 COAGULATION TIME ACTIVATED: CPT

## 2025-06-24 PROCEDURE — 36415 COLL VENOUS BLD VENIPUNCTURE: CPT

## 2025-06-24 PROCEDURE — B2111ZZ FLUOROSCOPY OF MULTIPLE CORONARY ARTERIES USING LOW OSMOLAR CONTRAST: ICD-10-PCS | Performed by: INTERNAL MEDICINE

## 2025-06-24 PROCEDURE — 2500000003 HC RX 250 WO HCPCS: Performed by: STUDENT IN AN ORGANIZED HEALTH CARE EDUCATION/TRAINING PROGRAM

## 2025-06-24 PROCEDURE — 027035Z DILATION OF CORONARY ARTERY, ONE ARTERY WITH TWO DRUG-ELUTING INTRALUMINAL DEVICES, PERCUTANEOUS APPROACH: ICD-10-PCS | Performed by: INTERNAL MEDICINE

## 2025-06-24 PROCEDURE — 99153 MOD SED SAME PHYS/QHP EA: CPT | Performed by: INTERNAL MEDICINE

## 2025-06-24 PROCEDURE — C1887 CATHETER, GUIDING: HCPCS | Performed by: INTERNAL MEDICINE

## 2025-06-24 PROCEDURE — 99152 MOD SED SAME PHYS/QHP 5/>YRS: CPT | Performed by: INTERNAL MEDICINE

## 2025-06-24 PROCEDURE — 93016 CV STRESS TEST SUPVJ ONLY: CPT | Performed by: INTERNAL MEDICINE

## 2025-06-24 PROCEDURE — C1760 CLOSURE DEV, VASC: HCPCS | Performed by: INTERNAL MEDICINE

## 2025-06-24 PROCEDURE — C1894 INTRO/SHEATH, NON-LASER: HCPCS | Performed by: INTERNAL MEDICINE

## 2025-06-24 PROCEDURE — 93458 L HRT ARTERY/VENTRICLE ANGIO: CPT | Performed by: INTERNAL MEDICINE

## 2025-06-24 PROCEDURE — 2580000003 HC RX 258: Performed by: INTERNAL MEDICINE

## 2025-06-24 PROCEDURE — 85347 COAGULATION TIME ACTIVATED: CPT | Performed by: INTERNAL MEDICINE

## 2025-06-24 PROCEDURE — 6370000000 HC RX 637 (ALT 250 FOR IP)

## 2025-06-24 PROCEDURE — 93005 ELECTROCARDIOGRAM TRACING: CPT | Performed by: INTERNAL MEDICINE

## 2025-06-24 PROCEDURE — 2060000000 HC ICU INTERMEDIATE R&B

## 2025-06-24 PROCEDURE — 6360000002 HC RX W HCPCS: Performed by: STUDENT IN AN ORGANIZED HEALTH CARE EDUCATION/TRAINING PROGRAM

## 2025-06-24 PROCEDURE — 4A023N7 MEASUREMENT OF CARDIAC SAMPLING AND PRESSURE, LEFT HEART, PERCUTANEOUS APPROACH: ICD-10-PCS | Performed by: INTERNAL MEDICINE

## 2025-06-24 PROCEDURE — 6360000002 HC RX W HCPCS: Performed by: INTERNAL MEDICINE

## 2025-06-24 PROCEDURE — APPNB15 APP NON BILLABLE TIME 0-15 MINS

## 2025-06-24 PROCEDURE — 85576 BLOOD PLATELET AGGREGATION: CPT

## 2025-06-24 PROCEDURE — C1874 STENT, COATED/COV W/DEL SYS: HCPCS | Performed by: INTERNAL MEDICINE

## 2025-06-24 DEVICE — STENT CORONARY ONYX FRONTIER RX 3.5X18 MM ZOTAROLIMUS ELUT: Type: IMPLANTABLE DEVICE | Status: FUNCTIONAL

## 2025-06-24 DEVICE — ANGIO-SEAL VIP VASCULAR CLOSURE DEVICE
Type: IMPLANTABLE DEVICE | Status: FUNCTIONAL
Brand: ANGIO-SEAL

## 2025-06-24 RX ORDER — SODIUM CHLORIDE 9 MG/ML
INJECTION, SOLUTION INTRAVENOUS PRN
Status: DISCONTINUED | OUTPATIENT
Start: 2025-06-24 | End: 2025-06-25 | Stop reason: HOSPADM

## 2025-06-24 RX ORDER — ENOXAPARIN SODIUM 100 MG/ML
30 INJECTION SUBCUTANEOUS DAILY
Status: DISCONTINUED | OUTPATIENT
Start: 2025-06-25 | End: 2025-06-25 | Stop reason: HOSPADM

## 2025-06-24 RX ORDER — SODIUM CHLORIDE 0.9 % (FLUSH) 0.9 %
5-40 SYRINGE (ML) INJECTION EVERY 12 HOURS SCHEDULED
Status: DISCONTINUED | OUTPATIENT
Start: 2025-06-24 | End: 2025-06-25 | Stop reason: HOSPADM

## 2025-06-24 RX ORDER — SODIUM CHLORIDE 9 MG/ML
INJECTION, SOLUTION INTRAVENOUS CONTINUOUS
Status: DISCONTINUED | OUTPATIENT
Start: 2025-06-24 | End: 2025-06-24

## 2025-06-24 RX ORDER — LOPERAMIDE HYDROCHLORIDE 2 MG/1
2 CAPSULE ORAL 4 TIMES DAILY PRN
Status: DISCONTINUED | OUTPATIENT
Start: 2025-06-24 | End: 2025-06-25 | Stop reason: HOSPADM

## 2025-06-24 RX ORDER — ACETAMINOPHEN 325 MG/1
650 TABLET ORAL EVERY 4 HOURS PRN
Status: DISCONTINUED | OUTPATIENT
Start: 2025-06-24 | End: 2025-06-25 | Stop reason: HOSPADM

## 2025-06-24 RX ORDER — ASPIRIN 81 MG/1
81 TABLET, CHEWABLE ORAL DAILY
Status: DISCONTINUED | OUTPATIENT
Start: 2025-06-25 | End: 2025-06-24

## 2025-06-24 RX ORDER — SENNOSIDES 8.6 MG
CAPSULE ORAL PRN
Status: DISCONTINUED | OUTPATIENT
Start: 2025-06-24 | End: 2025-06-24 | Stop reason: HOSPADM

## 2025-06-24 RX ORDER — SODIUM CHLORIDE 0.9 % (FLUSH) 0.9 %
5-40 SYRINGE (ML) INJECTION PRN
Status: DISCONTINUED | OUTPATIENT
Start: 2025-06-24 | End: 2025-06-25 | Stop reason: HOSPADM

## 2025-06-24 RX ORDER — SODIUM CHLORIDE 9 MG/ML
INJECTION, SOLUTION INTRAVENOUS CONTINUOUS PRN
Status: COMPLETED | OUTPATIENT
Start: 2025-06-24 | End: 2025-06-24

## 2025-06-24 RX ORDER — MIDAZOLAM HYDROCHLORIDE 1 MG/ML
INJECTION, SOLUTION INTRAMUSCULAR; INTRAVENOUS PRN
Status: DISCONTINUED | OUTPATIENT
Start: 2025-06-24 | End: 2025-06-24 | Stop reason: HOSPADM

## 2025-06-24 RX ORDER — CLOPIDOGREL BISULFATE 75 MG/1
TABLET ORAL PRN
Status: DISCONTINUED | OUTPATIENT
Start: 2025-06-24 | End: 2025-06-24 | Stop reason: HOSPADM

## 2025-06-24 RX ORDER — CLOPIDOGREL BISULFATE 75 MG/1
75 TABLET ORAL DAILY
Status: DISCONTINUED | OUTPATIENT
Start: 2025-06-25 | End: 2025-06-25 | Stop reason: HOSPADM

## 2025-06-24 RX ORDER — IOPAMIDOL 755 MG/ML
INJECTION, SOLUTION INTRAVASCULAR PRN
Status: DISCONTINUED | OUTPATIENT
Start: 2025-06-24 | End: 2025-06-24 | Stop reason: HOSPADM

## 2025-06-24 RX ADMIN — HYDRALAZINE HYDROCHLORIDE 50 MG: 50 TABLET ORAL at 22:02

## 2025-06-24 RX ADMIN — SODIUM CHLORIDE: 0.9 INJECTION, SOLUTION INTRAVENOUS at 11:18

## 2025-06-24 RX ADMIN — ONDANSETRON 4 MG: 2 INJECTION INTRAMUSCULAR; INTRAVENOUS at 05:28

## 2025-06-24 RX ADMIN — METOPROLOL TARTRATE 12.5 MG: 25 TABLET, FILM COATED ORAL at 09:25

## 2025-06-24 RX ADMIN — METOPROLOL TARTRATE 12.5 MG: 25 TABLET, FILM COATED ORAL at 21:58

## 2025-06-24 RX ADMIN — SODIUM CHLORIDE, PRESERVATIVE FREE 10 ML: 5 INJECTION INTRAVENOUS at 22:04

## 2025-06-24 RX ADMIN — ATORVASTATIN CALCIUM 40 MG: 40 TABLET, FILM COATED ORAL at 22:02

## 2025-06-24 RX ADMIN — OXYCODONE HYDROCHLORIDE AND ACETAMINOPHEN 1 TABLET: 7.5; 325 TABLET ORAL at 11:12

## 2025-06-24 RX ADMIN — LOPERAMIDE HYDROCHLORIDE 2 MG: 2 CAPSULE ORAL at 05:52

## 2025-06-24 RX ADMIN — PANTOPRAZOLE SODIUM 40 MG: 40 TABLET, DELAYED RELEASE ORAL at 09:25

## 2025-06-24 RX ADMIN — ASPIRIN 81 MG: 81 TABLET, CHEWABLE ORAL at 09:25

## 2025-06-24 RX ADMIN — SODIUM CHLORIDE, PRESERVATIVE FREE 5 ML: 5 INJECTION INTRAVENOUS at 09:27

## 2025-06-24 RX ADMIN — TRAZODONE HYDROCHLORIDE 150 MG: 50 TABLET ORAL at 22:02

## 2025-06-24 RX ADMIN — OXYCODONE HYDROCHLORIDE AND ACETAMINOPHEN 1 TABLET: 7.5; 325 TABLET ORAL at 19:22

## 2025-06-24 RX ADMIN — ROPINIROLE HYDROCHLORIDE 0.25 MG: 0.25 TABLET, FILM COATED ORAL at 22:02

## 2025-06-24 RX ADMIN — OXYCODONE HYDROCHLORIDE AND ACETAMINOPHEN 1 TABLET: 7.5; 325 TABLET ORAL at 01:22

## 2025-06-24 RX ADMIN — ISOSORBIDE MONONITRATE 30 MG: 30 TABLET, EXTENDED RELEASE ORAL at 09:25

## 2025-06-24 RX ADMIN — SODIUM CHLORIDE, PRESERVATIVE FREE 10 ML: 5 INJECTION INTRAVENOUS at 13:35

## 2025-06-24 RX ADMIN — SODIUM CHLORIDE, PRESERVATIVE FREE 10 ML: 5 INJECTION INTRAVENOUS at 22:03

## 2025-06-24 ASSESSMENT — PAIN DESCRIPTION - ORIENTATION
ORIENTATION: RIGHT;LEFT
ORIENTATION: LEFT;RIGHT
ORIENTATION: RIGHT;LEFT

## 2025-06-24 ASSESSMENT — PAIN SCALES - GENERAL
PAINLEVEL_OUTOF10: 7
PAINLEVEL_OUTOF10: 3
PAINLEVEL_OUTOF10: 7
PAINLEVEL_OUTOF10: 3
PAINLEVEL_OUTOF10: 7

## 2025-06-24 ASSESSMENT — PAIN SCALES - WONG BAKER: WONGBAKER_NUMERICALRESPONSE: NO HURT

## 2025-06-24 ASSESSMENT — PAIN DESCRIPTION - DESCRIPTORS
DESCRIPTORS: ACHING
DESCRIPTORS: DISCOMFORT
DESCRIPTORS: DISCOMFORT

## 2025-06-24 ASSESSMENT — PAIN - FUNCTIONAL ASSESSMENT
PAIN_FUNCTIONAL_ASSESSMENT: ACTIVITIES ARE NOT PREVENTED

## 2025-06-24 ASSESSMENT — PAIN DESCRIPTION - FREQUENCY: FREQUENCY: CONTINUOUS

## 2025-06-24 ASSESSMENT — PAIN DESCRIPTION - ONSET: ONSET: ON-GOING

## 2025-06-24 NOTE — CONSULTS
[facility] Nurse  Consult Note       Yovanny Levine  AGE: 50 y.o.   GENDER: male  : 1975  TODAY'S DATE:  2025    Subjective:     Reason for Evaluation and Assessment: wound assessment       Yovanny Levine is a 50 y.o. male referred by:   [x] Physician  [] Nursing  [] Other:     Wound Identification:  Wound Type: diabetic and traumatic  Contributing Factors: diabetes, poor glucose control, chronic pressure, shear force, and poor hygiene        PAST MEDICAL HISTORY        Diagnosis Date    Abscess 2010    scrotal    Acute renal failure (ARF) 2019    Anxiety associated with depression     Back pain 2012    CAD (coronary artery disease) 02/10/2023    Chest pain 2013    Diabetic neuropathy (HCC) 2011    Diabetic ulcer of left midfoot associated with type 2 diabetes mellitus, with fat layer exposed ([facility]) 2017    Diabetic ulcer of right midfoot associated with type 2 diabetes mellitus, with fat layer exposed ([facility]) 2024    Diverticulosis     C scope + Dr. Medina    DM (diabetes mellitus), type 2 ([facility])     DR. 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 Medina    Nausea and vomiting 2019    Necrotizing fasciitis ([facility])     Nose fracture     Panic attacks     Pericarditis     Hospitalized with s/p heart cath normal    Peripheral autonomic neuropathy due to diabetes mellitus (HCC)     Axonal EMG- [facility], 2011    Sebaceous cyst 2011    URI (upper respiratory infection) 2012    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 type 2 diabetes mellitus, with muscle involvement without evidence of necrosis

## 2025-06-24 NOTE — PLAN OF CARE
Problem: Chronic Conditions and Co-morbidities  Goal: Patient's chronic conditions and co-morbidity symptoms are monitored and maintained or improved  6/23/2025 2347 by Hermelinda Nobles RN  Outcome: Progressing     Problem: Discharge Planning  Goal: Discharge to home or other facility with appropriate resources  6/23/2025 2347 by Hermelinda Nobles RN  Outcome: Progressing     Problem: Pain  Goal: Verbalizes/displays adequate comfort level or baseline comfort level  6/23/2025 2347 by Hermelinda Nobles, RN  Outcome: Progressing     Problem: Safety - Adult  Goal: Free from fall injury  6/23/2025 2347 by Hermelinda Nobles, RN  Outcome: Progressing

## 2025-06-24 NOTE — PLAN OF CARE
Problem: Chronic Conditions and Co-morbidities  Goal: Patient's chronic conditions and co-morbidity symptoms are monitored and maintained or improved  Outcome: Progressing  Flowsheets  Taken 6/24/2025 1318  Care Plan - Patient's Chronic Conditions and Co-Morbidity Symptoms are Monitored and Maintained or Improved: Monitor and assess patient's chronic conditions and comorbid symptoms for stability, deterioration, or improvement  Taken 6/24/2025 1118  Care Plan - Patient's Chronic Conditions and Co-Morbidity Symptoms are Monitored and Maintained or Improved: Monitor and assess patient's chronic conditions and comorbid symptoms for stability, deterioration, or improvement     Problem: Discharge Planning  Goal: Discharge to home or other facility with appropriate resources  Outcome: Progressing  Flowsheets  Taken 6/24/2025 1318  Discharge to home or other facility with appropriate resources: Identify barriers to discharge with patient and caregiver  Taken 6/24/2025 1118  Discharge to home or other facility with appropriate resources: Identify barriers to discharge with patient and caregiver     Problem: Pain  Goal: Verbalizes/displays adequate comfort level or baseline comfort level  Outcome: Progressing  Flowsheets  Taken 6/24/2025 1338  Verbalizes/displays adequate comfort level or baseline comfort level: Encourage patient to monitor pain and request assistance  Taken 6/24/2025 1109  Verbalizes/displays adequate comfort level or baseline comfort level: Encourage patient to monitor pain and request assistance     Problem: Safety - Adult  Goal: Free from fall injury  Outcome: Progressing

## 2025-06-25 VITALS
DIASTOLIC BLOOD PRESSURE: 73 MMHG | HEART RATE: 69 BPM | SYSTOLIC BLOOD PRESSURE: 120 MMHG | TEMPERATURE: 97.5 F | RESPIRATION RATE: 15 BRPM | BODY MASS INDEX: 37.87 KG/M2 | WEIGHT: 255.7 LBS | HEIGHT: 69 IN | OXYGEN SATURATION: 99 %

## 2025-06-25 LAB
ACTIVATED CLOTTING TIME, LOW RANGE: >400 SEC (ref 89–169)
ANION GAP SERPL CALCULATED.3IONS-SCNC: 19 MMOL/L (ref 9–17)
BUN SERPL-MCNC: 75 MG/DL (ref 7–20)
CALCIUM SERPL-MCNC: 8.3 MG/DL (ref 8.3–10.6)
CHLORIDE SERPL-SCNC: 96 MMOL/L (ref 99–110)
CO2 SERPL-SCNC: 18 MMOL/L (ref 21–32)
CREAT SERPL-MCNC: 9.8 MG/DL (ref 0.9–1.3)
ERYTHROCYTE [DISTWIDTH] IN BLOOD BY AUTOMATED COUNT: 14.6 % (ref 11.7–14.9)
GFR, ESTIMATED: 6 ML/MIN/1.73M2
GLUCOSE BLD-MCNC: 125 MG/DL (ref 74–99)
GLUCOSE BLD-MCNC: 190 MG/DL (ref 74–99)
GLUCOSE SERPL-MCNC: 160 MG/DL (ref 74–99)
HCT VFR BLD AUTO: 28.8 % (ref 42–52)
HGB BLD-MCNC: 9.2 G/DL (ref 13.5–18)
MCH RBC QN AUTO: 31.1 PG (ref 27–31)
MCHC RBC AUTO-ENTMCNC: 31.9 G/DL (ref 32–36)
MCV RBC AUTO: 97.3 FL (ref 78–100)
PLATELET # BLD AUTO: 155 K/UL (ref 140–440)
PMV BLD AUTO: 10.4 FL (ref 7.5–11.1)
POTASSIUM SERPL-SCNC: 5.1 MMOL/L (ref 3.5–5.1)
RBC # BLD AUTO: 2.96 M/UL (ref 4.6–6.2)
SODIUM SERPL-SCNC: 133 MMOL/L (ref 136–145)
WBC OTHER # BLD: 8.3 K/UL (ref 4–10.5)

## 2025-06-25 PROCEDURE — 6360000002 HC RX W HCPCS: Performed by: INTERNAL MEDICINE

## 2025-06-25 PROCEDURE — 82962 GLUCOSE BLOOD TEST: CPT

## 2025-06-25 PROCEDURE — 6370000000 HC RX 637 (ALT 250 FOR IP): Performed by: INTERNAL MEDICINE

## 2025-06-25 PROCEDURE — 85027 COMPLETE CBC AUTOMATED: CPT

## 2025-06-25 PROCEDURE — 36415 COLL VENOUS BLD VENIPUNCTURE: CPT

## 2025-06-25 PROCEDURE — 80048 BASIC METABOLIC PNL TOTAL CA: CPT

## 2025-06-25 PROCEDURE — 94761 N-INVAS EAR/PLS OXIMETRY MLT: CPT

## 2025-06-25 PROCEDURE — 2500000003 HC RX 250 WO HCPCS: Performed by: INTERNAL MEDICINE

## 2025-06-25 PROCEDURE — 2700000000 HC OXYGEN THERAPY PER DAY

## 2025-06-25 PROCEDURE — APPNB15 APP NON BILLABLE TIME 0-15 MINS

## 2025-06-25 RX ORDER — HEPARIN SODIUM 1000 [USP'U]/ML
5000 INJECTION, SOLUTION INTRAVENOUS; SUBCUTANEOUS
Status: DISCONTINUED | OUTPATIENT
Start: 2025-06-25 | End: 2025-06-25

## 2025-06-25 RX ORDER — CLOPIDOGREL BISULFATE 75 MG/1
75 TABLET ORAL DAILY
Qty: 90 TABLET | Refills: 3 | Status: SHIPPED | OUTPATIENT
Start: 2025-06-25

## 2025-06-25 RX ORDER — HEPARIN SODIUM 1000 [USP'U]/ML
5000 INJECTION, SOLUTION INTRAVENOUS; SUBCUTANEOUS
Status: COMPLETED | OUTPATIENT
Start: 2025-06-25 | End: 2025-06-25

## 2025-06-25 RX ORDER — INSULIN GLARGINE 100 [IU]/ML
15 INJECTION, SOLUTION SUBCUTANEOUS NIGHTLY
Qty: 4.5 ML | Refills: 3 | Status: SHIPPED | OUTPATIENT
Start: 2025-06-25 | End: 2025-10-23

## 2025-06-25 RX ORDER — ATORVASTATIN CALCIUM 40 MG/1
40 TABLET, FILM COATED ORAL NIGHTLY
Qty: 90 TABLET | Refills: 2 | Status: SHIPPED | OUTPATIENT
Start: 2025-06-25

## 2025-06-25 RX ORDER — METOPROLOL TARTRATE 25 MG/1
12.5 TABLET, FILM COATED ORAL 2 TIMES DAILY
Qty: 60 TABLET | Refills: 3 | Status: SHIPPED | OUTPATIENT
Start: 2025-06-25

## 2025-06-25 RX ADMIN — OXYCODONE HYDROCHLORIDE AND ACETAMINOPHEN 1 TABLET: 7.5; 325 TABLET ORAL at 12:42

## 2025-06-25 RX ADMIN — OXYCODONE HYDROCHLORIDE AND ACETAMINOPHEN 1 TABLET: 7.5; 325 TABLET ORAL at 04:18

## 2025-06-25 RX ADMIN — ENOXAPARIN SODIUM 30 MG: 100 INJECTION SUBCUTANEOUS at 12:47

## 2025-06-25 RX ADMIN — SODIUM CHLORIDE, PRESERVATIVE FREE 10 ML: 5 INJECTION INTRAVENOUS at 12:45

## 2025-06-25 RX ADMIN — HEPARIN SODIUM 5000 UNITS: 1000 INJECTION INTRAVENOUS; SUBCUTANEOUS at 08:51

## 2025-06-25 RX ADMIN — INSULIN LISPRO 1 UNITS: 100 INJECTION, SOLUTION INTRAVENOUS; SUBCUTANEOUS at 06:52

## 2025-06-25 RX ADMIN — PANTOPRAZOLE SODIUM 40 MG: 40 TABLET, DELAYED RELEASE ORAL at 12:45

## 2025-06-25 RX ADMIN — ISOSORBIDE MONONITRATE 30 MG: 30 TABLET, EXTENDED RELEASE ORAL at 12:45

## 2025-06-25 RX ADMIN — CLOPIDOGREL BISULFATE 75 MG: 75 TABLET, FILM COATED ORAL at 12:45

## 2025-06-25 RX ADMIN — ONDANSETRON 4 MG: 2 INJECTION INTRAMUSCULAR; INTRAVENOUS at 06:52

## 2025-06-25 RX ADMIN — METOPROLOL TARTRATE 12.5 MG: 25 TABLET, FILM COATED ORAL at 12:44

## 2025-06-25 RX ADMIN — LOPERAMIDE HYDROCHLORIDE 2 MG: 2 CAPSULE ORAL at 03:40

## 2025-06-25 RX ADMIN — ASPIRIN 81 MG: 81 TABLET, CHEWABLE ORAL at 12:45

## 2025-06-25 RX ADMIN — HYDRALAZINE HYDROCHLORIDE 50 MG: 50 TABLET ORAL at 12:45

## 2025-06-25 ASSESSMENT — PAIN SCALES - GENERAL
PAINLEVEL_OUTOF10: 7
PAINLEVEL_OUTOF10: 7

## 2025-06-25 ASSESSMENT — PAIN DESCRIPTION - ORIENTATION: ORIENTATION: LEFT;RIGHT

## 2025-06-25 ASSESSMENT — PAIN DESCRIPTION - LOCATION
LOCATION: FOOT;BACK
LOCATION: FOOT;BACK

## 2025-06-25 ASSESSMENT — PAIN DESCRIPTION - PAIN TYPE: TYPE: CHRONIC PAIN

## 2025-06-25 ASSESSMENT — PAIN - FUNCTIONAL ASSESSMENT: PAIN_FUNCTIONAL_ASSESSMENT: ACTIVITIES ARE NOT PREVENTED

## 2025-06-25 ASSESSMENT — PAIN DESCRIPTION - DESCRIPTORS
DESCRIPTORS: ACHING;THROBBING
DESCRIPTORS: THROBBING

## 2025-06-25 ASSESSMENT — PAIN DESCRIPTION - ONSET: ONSET: ON-GOING

## 2025-06-25 ASSESSMENT — PAIN DESCRIPTION - FREQUENCY: FREQUENCY: CONTINUOUS

## 2025-06-25 ASSESSMENT — PAIN SCALES - WONG BAKER: WONGBAKER_NUMERICALRESPONSE: NO HURT

## 2025-06-25 NOTE — DISCHARGE SUMMARY
albuterol sulfate  (90 Base) MCG/ACT inhaler  Commonly known as: Ventolin HFA  Inhale 2 puffs into the lungs 4 times daily as needed for Wheezing     aspirin 81 MG chewable tablet  Take 1 tablet by mouth daily     budesonide-formoterol 160-4.5 MCG/ACT Aero  Commonly known as: SYMBICORT  Inhale 2 puffs into the lungs in the morning and 2 puffs in the evening.     calcitRIOL 0.5 MCG capsule  Commonly known as: ROCALTROL  Take 1 capsule by mouth 2 times daily     clopidogrel 75 MG tablet  Commonly known as: PLAVIX  Take 1 tablet by mouth daily     empagliflozin 10 MG tablet  Commonly known as: JARDIANCE  Take 1 tablet by mouth daily     gentamicin 0.1 % ointment  Commonly known as: GARAMYCIN  Apply topically daily Apply 1 gm topically with each dressing change, daily and prn.     glucose monitoring kit  1 each by Does not apply route once for 1 dose.     hydrALAZINE 50 MG tablet  Commonly known as: APRESOLINE  Take 0.5 tablets by mouth in the morning and at bedtime     isosorbide mononitrate 30 MG extended release tablet  Commonly known as: IMDUR  Take 0.5 tablets by mouth daily     Lancets Misc  1 each by Does not apply route daily     Lantus SoloStar 100 UNIT/ML injection pen  Generic drug: insulin glargine  Inject 15 Units into the skin nightly     metoprolol tartrate 25 MG tablet  Commonly known as: LOPRESSOR  Take 0.5 tablets by mouth 2 times daily     oxyCODONE-acetaminophen 7.5-325 MG per tablet  Commonly known as: Percocet  Take 1 tablet by mouth every 8 hours as needed for Pain for up to 30 days. Intended supply: 30 days Max Daily Amount: 3 tablets     OXYGEN     rOPINIRole 0.25 MG tablet  Commonly known as: REQUIP  Take 1 tablet by mouth nightly     tiotropium 2.5 MCG/ACT Aers inhaler  Commonly known as: SPIRIVA RESPIMAT  Inhale 2 puffs into the lungs daily     traZODone 150 MG tablet  Commonly known as: DESYREL            STOP taking these medications      chlorthalidone 25 MG tablet  Commonly

## 2025-06-25 NOTE — PROGRESS NOTES
[place] [address]  Phone: [phone]    Fax [phone]                  Elen Ibarra MD, [facility]       Eric Isbell MD, [facility]  Sukhi Schafer MD, [facility]    MD Miguel Angel Watkins MD Tariq Rizvi, MD Bilal Alam, MD Dr. Waseem Sajjad MD Melissa Kellis, APRKIMBERLEE Mondragon, APPLE Cabrera, APRKIMBERLEE Hernandez, APRKIMBERLEE Escudero PA-C    CARDIOLOGY  NOTE      Name:  Yovanny Levine /Age/Sex: 1975  (50 y.o. male)   MRN & CSN:  [id number] & [id number] Admission Date/Time: 2025  3:12 PM   Location:  -A PCP: José Luis Izquierdo MD       Hospital Day: 5    - Cardiology consult is for: Chest pain      ASSESSMENT/ PLAN:  Chest pain with history of PCI  -Troponin non-ACS trend.  EKG nonischemic  - Stress test today concerning for inferior ischemia.  -Echocardiogram revealing EF 55 to 60%  -Underwent successful PCI of LAD x 2 today.  -Aspirin, clopidogrel, atorvastatin 40 mg nightly  -Start Lopressor 12.5 mg twice daily  - Imdur 30 mg daily  Peripheral arterial disease  -No claudication like symptoms at this time  Hypertension  -BP stable at this time  End-stage renal disease on hemodialysis  -Nephrology following.  - Undergoing dialysis today          Subjective:  Yovanny is a 50 y.o.year old     Chest pain free at this time.  No significant lightheadedness, dizziness, lower extremity edema, and  No shortness of good.  Overall patient is improved.    Patient's son at bedside    Discussed care with nursing staff    Objective: Temperature:  Current - Temp: 97.9 °F (36.6 °C); Max - Temp  Av.8 °F (36.6 °C)  Min: 97.5 °F (36.4 °C)  Max: 98 °F (36.7 °C)    Respiratory Rate : Resp  Av.5  Min: 15  Max: 18    Pulse Range: Pulse  Av.9  Min: 69  Max: 88    Blood Presuure Range:  Systolic (24hrs), Av , Min:103 , Max:174   ; Diastolic (24hrs), Av, Min:68, 
                                             [place] [address]  Phone: [phone]    Fax [phone]                  Elen Ibarra MD, [facility]       Eric Isbell MD, [facility]  Sukhi Schafer MD, [facility]    MD Miguel Angel Watkins MD Tariq Rizvi, MD Bilal Alam, MD Dr. Waseem Sajjad MD Melissa Kellis, APRKIMBERLEE oMndragon, APRKIMBERLEE Cabrera, APRKIMBERLEE Hernandez, APRN  Jon Escudero PA-C    CARDIOLOGY  NOTE      Name:  Yovanny Levine /Age/Sex: 1975  (50 y.o. male)   MRN & CSN:  [id number] & [id number] Admission Date/Time: 2025  3:12 PM   Location:  [address] [place] [address]- PCP: José Luis Izquierdo MD       Hospital Day: 4    - Cardiology consult is for: Chest pain      ASSESSMENT/ PLAN:  Chest pain with history of PCI  -Troponin non-ACS trend.  EKG nonischemic  - Stress test today concerning for inferior ischemia.  -Echocardiogram today revealing EF 55 to 60%  -Plan for heart catheterization tomorrow.  N.p.o. after midnight  -Aspirin, clopidogrel, atorvastatin 40 mg nightly  - Imdur 30 mg daily  Peripheral arterial disease  -No claudication like symptoms at this time  Hypertension  -BP stable at this time  End-stage renal disease on hemodialysis  -Nephrology following.  - Undergoing dialysis today  -On IV Lasix at this time          Subjective:  Yovanny is a 50 y.o.year old     Patient did experience some chest discomfort during stress test.  Complains that his chest discomfort is worse with exertion.    Patient is pretty fatigued after multiple test and dialysis today.    Informed of abnormal stress test.  Educated patient on need for left heart catheterization.  Procedure was explained in detail as well as risks and benefits.  Patient voiced understanding and was agreeable to undergoing procedure.  Consent was obtained.     Plan for left heart catheterization tomorrow. NPO after midnight     Objective: Temperature:  Current - Temp: 98 
                                             [place] [address]  Phone: [phone]    Fax [phone]                  Elen Ibarra MD, [facility]       Eric Isbell MD, [facility]  Sukhi Schafer MD, [facility]    MD Miguel Angel Watkins MD Tariq Rizvi, MD Bilal Alam, MD Dr. Waseem Sajjad MD Melissa Kellis, APRKIMBERLEE Mondragon, APRKIMBERLEE Cabrera, APRKIMBERLEE Hernandez, APRN  Jon Escudero PA-C    CARDIOLOGY  NOTE      Name:  Yovanny Levine /Age/Sex: 1975  (50 y.o. male)   MRN & CSN:  [id number] & [id number] Admission Date/Time: 2025  3:12 PM   Location:  -A PCP: José Luis Izquierdo MD       Hospital Day: 6    - Cardiology consult is for: Chest pain      ASSESSMENT/ PLAN:  Chest pain with history of PCI  -Troponin non-ACS trend.  EKG nonischemic  - Stress test today concerning for inferior ischemia.  -Echocardiogram revealing EF 55 to 60%  -Underwent successful PCI of LAD x 2 yesterday.  -Aspirin, clopidogrel, atorvastatin 40 mg nightly  -Continue Lopressor 12.5 mg twice daily  - Imdur 30 mg daily  Peripheral arterial disease  -No claudication like symptoms at this time  Hypertension  -BP stable at this time  End-stage renal disease on hemodialysis  -Nephrology following.  - Undergoing dialysis today      Cardiology will sign off, please call with any questions.  Patient to follow-up in clinic     Subjective:  Yovanny is a 50 y.o.year old     Patient undergoing dialysis this morning.    Patient states that overall he is feeling better.  Fatigued from dialysis.    Patient stated that he did have some mild chest tightness that quickly faded this morning.    Objective: Temperature:  Current - Temp: 98.4 °F (36.9 °C); Max - Temp  Av.8 °F (36.6 °C)  Min: 97.4 °F (36.3 °C)  Max: 98.4 °F (36.9 °C)    Respiratory Rate : Resp  Avg: 15.3  Min: 12  Max: 21    Pulse Range: Pulse  Av.7  Min: 62  Max: 69    Blood Presuure 
    V2.0  [facility] [place]      Name:  Yovanny Levine /Age/Sex: 1975  (50 y.o. male)   MRN & CSN:  [id number] & [id number] Encounter Date/Time: 2025 12:42 PM EDT    Location:  [address] [place] [address] PCP: José Luis Izquierdo MD       Hospital Day: 4    Assessment and Plan:   Yovanny Levine is a 50 y.o. male who presents with Atypical chest pain    Assessment and Plan:  Elevated troponin with ACS rule out.  Atypical chest pain ACS rule out cardiology consulted.  Echocardiogram shows EF 55 to 60%, hypokinesis of the basal to mid inferior wall segments, consistent with previous echo.  Underwent stress test which showed possible inferior ischemia.  Undergoing LHC tomorrow.  ESRD on dialysis nephrology on board  History of chronic left-sided foot osteomyelitis with management by general surgery done outpatient has a plan for 6 weeks of antibiotic that was completed as outpatient  Coronary artery disease with history of PCI and stenting to LAD and LCx currently on aspirin Plavix statin and Coreg  Diabetes mellitus type 2 start the patient on sliding scale insulin monitor for hypoglycemia  Hyperlipidemia on statin  Chronic pain syndrome on home dose of Percocet  Benign essential hypertension on amlodipine carvedilol hydralazine and Imdur      Diet Diet NPO Exceptions are: Sips of Water with Meds, Ice Chips  ADULT DIET; Regular; 5 carb choices (75 gm/meal); Low Fat/Low Chol/High Fiber/ERLIN; Low Sodium (2 gm); Low Potassium (Less than 3000 mg/day)   DVT Prophylaxis [] Lovenox, []  Heparin, [] SCDs, [] Ambulation,  [] Eliquis, [] Xarelto  [] Coumadin   Code Status Full Code   Disposition From: Home  Expected Disposition: Likely home  Estimated Date of Discharge: Tomorrow  Patient requires continued admission due to depend on cardiology clearance, echocardiogram pending for tomorrow   Surrogate Decision Maker/ POA      Subjective:     Chief Complaint: Chest Pain (Started in the middle of dialysis. Pt states he was 
    V2.0  [facility] Hospitalist Progress Note      Name:  Yovanny Levine /Age/Sex: 1975  (50 y.o. male)   MRN & CSN:  [id number] & [id number] Encounter Date/Time: 2025 12:42 PM EDT    Location:  [address] [place] [address] PCP: José Luis Izquierdo MD       Hospital Day: 3    Assessment and Plan:   Yovanny Levine is a 50 y.o. male who presents with Atypical chest pain    Assessment and Plan:  Elevated troponin with ACS rule out.  Atypical chest pain ACS rule out cardiology consulted.  Awaiting echocardiogram.  Awaiting clearance from cardiology as well currently on aspirin and statin.  Telemetry..  If cleared by cardiology may consider discharge by tomorrow  ESRD on dialysis nephrology on board  History of chronic left-sided foot osteomyelitis with management by general surgery done outpatient has a plan for 6 weeks of antibiotic that was completed as outpatient  Coronary artery disease with history of PCI and stenting to LAD and LCx currently on aspirin Plavix statin and Coreg  Diabetes mellitus type 2 start the patient on sliding scale insulin monitor for hypoglycemia  Hyperlipidemia on statin  Chronic pain syndrome on home dose of Percocet  Benign essential hypertension on amlodipine carvedilol hydralazine and Imdur    Medical Decision Making:  The following items were considered in medical decision making:  Discussion of patient care with other providers  Reviewed clinical lab tests if any  Reviewed radiology tests if any  Reviewed other diagnostic tests/interventions  Independent review of radiologic images if any  Microbiology cultures and other micro tests if any    Estimated time spent for medical decision-making encompassing complexity of the case, history taking, medication review, physical examination, communication with family, RN, , discussion with specialists, and ancillary staff members to provide accurate care for the patient was around 25 minutes.    MDM (any 2 required for High level billing)   
    V2.0  [facility] Hospitalist Progress Note      Name:  Yovanny Levine /Age/Sex: 1975  (50 y.o. male)   MRN & CSN:  [id number] & [id number] Encounter Date/Time: 2025 12:42 PM EDT    Location:  -A PCP: José Luis Izquierdo MD       Hospital Day: 5    Assessment and Plan:   Yovanny Levine is a 50 y.o. male who presents with Atypical chest pain    Assessment and Plan:  Elevated troponin with ACS rule out.  Atypical chest pain ACS rule out cardiology consulted.  Echocardiogram shows EF 55 to 60%, hypokinesis of the basal to mid inferior wall segments, consistent with previous echo.  Underwent stress test 2025 which showed possible inferior ischemia.  Underwent LHC 2025 had 90% stenosis in the midsegment of LAD.  Had 2 stents in place and a stent was placed in an area between those 2 stents.  Continue with DAPT-aspirin and Plavix.  ESRD on dialysis nephrology on board.  Next dialysis tomorrow.  History of chronic left-sided foot osteomyelitis with management by general surgery done outpatient has a plan for 6 weeks of antibiotic that was completed as outpatient  Coronary artery disease with history of PCI and stenting to LAD and LCx currently on aspirin Plavix statin and Coreg  Diabetes mellitus type 2 start the patient on sliding scale insulin monitor for hypoglycemia  Hyperlipidemia on statin  Chronic pain syndrome on home dose of Percocet  Benign essential hypertension on amlodipine carvedilol hydralazine and Imdur      Diet ADULT DIET; Regular; 4 carb choices (60 gm/meal); Low Fat/Low Chol/High Fiber/2 gm Na   DVT Prophylaxis [] Lovenox, []  Heparin, [] SCDs, [] Ambulation,  [] Eliquis, [] Xarelto  [] Coumadin   Code Status Full Code   Disposition From: Home  Expected Disposition: Likely home  Estimated Date of Discharge: Tomorrow  Patient requires continued admission due to depend on cardiology clearance, echocardiogram pending for tomorrow   Surrogate Decision Maker/ POA      Subjective: 
  Physician Progress Note      PATIENT:               MICHELINE WILSON  CSN #:                  [id number]  :                       1975  ADMIT DATE:       2025 3:12 PM  DISCH DATE:  RESPONDING  PROVIDER #:        YOLANDA SUAREZ          QUERY TEXT:    Elevated cardiac troponin (cTc) levels are documented in the medical record   per PCP, Cardiology and Nephrology notes. Please clarify the cause:    The clinical indicators include:  -50 year old male admitted with SOB, CP, CAD, ESRD, HTN, chronic systolic CHF,   chronic respiratory failure  -per Cardiology PN 25 \"Chest pain suggestive of angina\"  -per 25 Cardiology PN \"Stress test today concerning for inferior   ischemia.\"  -per PCP H&P \"Elevated troponin ACS rule out cardiology, CAD\"  -per Nephrology consult \"Chest pain and hx CAD Hypervolemia ESRD on HD MWF   HTN\"  -Troponin HS Trophs 172,  165, 176, 155, 152,178, 162, 150, 149  -per LHC 25 \"Left cardiac catheterization selective coronary angiography   PTCA and stenting of the LAD Indication  Acute coronary syndrome \"  -Treatment serial Troponin HS, Nephrology and Cardiology consult, Stress test   , LHC with stent to LAD , ASA, Lipitor, start Lopressor BIS, Imdur daily  Options provided:  -- Myocardial injury, non-ischemic (non-traumatic) related to other cause,   Please specify cause.  -- Non-ST elevation myocardial infarction (NSTEMI)  -- Type 2 myocardial infarction related to, Please specify cause.  -- Other - I will add my own diagnosis  -- Disagree - Not applicable / Not valid  -- Disagree - Clinically unable to determine / Unknown  -- Refer to Clinical Documentation Reviewer    PROVIDER RESPONSE TEXT:    This patient has myocardial injury, non-ischemic (non-traumatic) related to   other cause    Query created by: Deborah Baptiste on 2025 3:23 PM      Electronically signed by:  YOLANDA SUAREZ 2025 4:18 PM          
4 Eyes Skin Assessment     NAME:  Yovanny Levine  YOB: 1975  MEDICAL RECORD NUMBER:  [id number]    The patient is being assessed for  Admission    I agree that at least one RN has performed a thorough Head to Toe Skin Assessment on the patient. ALL assessment sites listed below have been assessed.      Areas assessed by both nurses:    Head, Face, Ears, Shoulders, Back, Chest, Arms, Elbows, Hands, Sacrum. Buttock, Coccyx, Ischium, Legs. Feet and Heels, and Under Medical Devices         Does the Patient have a Wound? Yes wound(s) were present on assessment. LDA wound assessment was Initiated and completed by RN       Pradeep Prevention initiated by RN: No  Wound Care Orders initiated by RN: Yes    Pressure Injury (Stage 3,4, Unstageable, DTI, NWPT, and Complex wounds) if present, place Wound referral order by RN under : No    New Ostomies, if present place, Ostomy referral order under : No     Nurse 1 eSignature: Electronically signed by Paula Ludwig RN on 6/20/25 at 6:18 PM EDT    **SHARE this note so that the co-signing nurse can place an eSignature**    Nurse 2 eSignature: {Esignature:[id number]}   
CHART REVIEWED  PT EXAMINED  PROCEDURE DW PT  RISKS BENEFITS AND ALERTNATIVES EXPLAINED IN DETAIL  CONSENT OBTANED   
Cardiology Note    Admit Date:  6/20/2025    Admission diagnosis / Complaint :   Chest pain    Subjective:  Mr. Levine resting in bed.         Assessment and Plan:    Coronary artery disease status post PCI to LAD in December 2024  -Chest pain suggestive of angina  - Will get stress test tomorrow  - Continue Plavix and atorvastatin and aspirin    End-stage renal disease per primary team    Chronic heart failure with mildly reduced ejection fraction  - Continue IV Lasix 40 mg twice daily    Diabetes type 2  - Per primary team    Objective:   BP (!) 165/94   Pulse 88   Temp 98.2 °F (36.8 °C) (Oral)   Resp 18   Ht 1.753 m (5' 9\")   Wt 114.3 kg (252 lb)   SpO2 97%   BMI 37.21 kg/m²   No intake or output data in the 24 hours ending 06/22/25 1309    TELEMETRY: Sinus    has a past medical history of Abscess, Acute renal failure (ARF), Anxiety associated with depression, Back pain, CAD (coronary artery disease), Chest pain, Diabetic neuropathy (HCC), Diabetic ulcer of left midfoot associated with type 2 diabetes mellitus, with fat layer exposed (HCC), Diabetic ulcer of right midfoot associated with type 2 diabetes mellitus, with fat layer exposed (HCC), Diverticulosis, DM (diabetes mellitus), type 2 (HCC), ESRD (end stage renal disease) on dialysis (HCC), 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   has a past surgical history that includes Cardiac catheterization (2003, 2013); 
Nephrology Progress Note        [address] [place] [address]  Phone: [phone]  Office Hours: 8:30AM - 4:30PM  Monday - Friday 6/24/2025 7:29 AM  Subjective:   Admit Date: 6/20/2025  PCP: José Luis Izquierdo MD  Interval History:   On NC  Now awaiting [facility]    Diet: Diet NPO Exceptions are: Sips of Water with Meds, Ice Chips      Data:   Scheduled Meds:   isosorbide mononitrate  30 mg Oral Daily    metoprolol tartrate  12.5 mg Oral BID    hydrALAZINE  50 mg Oral BID    atorvastatin  40 mg Oral Nightly    aspirin  81 mg Oral Daily    sodium chloride flush  5-40 mL IntraVENous 2 times per day    enoxaparin  30 mg SubCUTAneous Daily    clopidogrel  75 mg Oral Daily    pantoprazole  40 mg Oral Daily    rOPINIRole  0.25 mg Oral Nightly    furosemide  40 mg IntraVENous BID    insulin lispro  0-4 Units SubCUTAneous 4x Daily AC & HS    traZODone  150 mg Oral Nightly     Continuous Infusions:   sodium chloride      dextrose      dextrose       PRN Meds:loperamide, hydrALAZINE, labetalol, calcium carbonate, sodium chloride flush, sodium chloride, ondansetron **OR** ondansetron, polyethylene glycol, acetaminophen **OR** acetaminophen, glucose, dextrose bolus **OR** dextrose bolus, glucagon (rDNA), dextrose, oxyCODONE-acetaminophen, glucose, dextrose bolus **OR** dextrose bolus, glucagon (rDNA), dextrose  I/O last 3 completed shifts:  In: 4510 [I.V.:4010]  Out: 2904   No intake/output data recorded.    Intake/Output Summary (Last 24 hours) at 6/24/2025 0729  Last data filed at 6/23/2025 1451  Gross per 24 hour   Intake 4510 ml   Output 2904 ml   Net 1606 ml       CBC:   Recent Labs     06/22/25  0023   WBC 7.1   HGB 10.8*          BMP:    Recent Labs     06/22/25  0023 06/23/25  0210 06/24/25  0237    137 136   K 3.8 4.5 4.2   CL 96* 97* 98*   CO2 25 23 21   BUN 32* 57* 54*   CREATININE 5.1* 7.8* 7.0*   GLUCOSE 200* 187* 163*   CALCIUM 8.6 8.6 8.4     Hepatic: No results for input(s): \"AST\", 
Nephrology Progress Note        [address] [place] [address]  Phone: [phone]  Office Hours: 8:30AM - 4:30PM  Monday - Friday 6/23/2025 6:55 AM  Subjective:   Admit Date: 6/20/2025  PCP: José Luis Izquierdo MD  Interval History:   On NC  Denies dyspnea  BP elevated  No leg edema    Diet: ADULT DIET; Regular; 5 carb choices (75 gm/meal); Low Fat/Low Chol/High Fiber/ERLIN; Low Sodium (2 gm); Low Potassium (Less than 3000 mg/day)      Data:   Scheduled Meds:   hydrALAZINE  50 mg Oral BID    atorvastatin  40 mg Oral Nightly    aspirin  81 mg Oral Daily    sodium chloride flush  5-40 mL IntraVENous 2 times per day    enoxaparin  30 mg SubCUTAneous Daily    clopidogrel  75 mg Oral Daily    pantoprazole  40 mg Oral Daily    rOPINIRole  0.25 mg Oral Nightly    furosemide  40 mg IntraVENous BID    insulin lispro  0-4 Units SubCUTAneous 4x Daily AC & HS    traZODone  150 mg Oral Nightly     Continuous Infusions:   sodium chloride      dextrose      dextrose       PRN Meds:hydrALAZINE, labetalol, calcium carbonate, sodium chloride flush, sodium chloride, ondansetron **OR** ondansetron, polyethylene glycol, acetaminophen **OR** acetaminophen, glucose, dextrose bolus **OR** dextrose bolus, glucagon (rDNA), dextrose, oxyCODONE-acetaminophen, glucose, dextrose bolus **OR** dextrose bolus, glucagon (rDNA), dextrose  I/O last 3 completed shifts:  In: 740 [P.O.:240]  Out: 3000   No intake/output data recorded.    Intake/Output Summary (Last 24 hours) at 6/23/2025 0655  Last data filed at 6/22/2025 1834  Gross per 24 hour   Intake 240 ml   Output --   Net 240 ml       CBC:   Recent Labs     06/20/25  1823 06/21/25  0157 06/22/25  0023   WBC 6.5 6.5 7.1   HGB 11.4* 10.7* 10.8*    179 213       BMP:    Recent Labs     06/21/25  0157 06/22/25  0023 06/23/25  0210    137 137   K 4.1 3.8 4.5   CL 97* 96* 97*   CO2 27 25 23   BUN 41* 32* 57*   CREATININE 5.7* 5.1* 7.8*   GLUCOSE 213* 200* 187*   CALCIUM 8.7 
Nephrology Progress Note        [address] [place] [address]  Phone: [phone]  Office Hours: 8:30AM - 4:30PM  Monday - Friday 6/25/2025 7:09 AM  Subjective:   Admit Date: 6/20/2025  PCP: José Luis Izquierdo MD  Interval History:   Doing ok  S/p LAD stent yesterday    Diet: ADULT DIET; Regular; 4 carb choices (60 gm/meal); Low Fat/Low Chol/High Fiber/2 gm Na      Data:   Scheduled Meds:   heparin (porcine)  5,000 Units IntraCATHeter Once in dialysis    enoxaparin  30 mg SubCUTAneous Daily    sodium chloride flush  5-40 mL IntraVENous 2 times per day    clopidogrel  75 mg Oral Daily    isosorbide mononitrate  30 mg Oral Daily    metoprolol tartrate  12.5 mg Oral BID    hydrALAZINE  50 mg Oral BID    atorvastatin  40 mg Oral Nightly    aspirin  81 mg Oral Daily    sodium chloride flush  5-40 mL IntraVENous 2 times per day    pantoprazole  40 mg Oral Daily    rOPINIRole  0.25 mg Oral Nightly    insulin lispro  0-4 Units SubCUTAneous 4x Daily AC & HS    traZODone  150 mg Oral Nightly     Continuous Infusions:   sodium chloride      sodium chloride      dextrose      dextrose       PRN Meds:loperamide, sodium chloride flush, sodium chloride, acetaminophen, hydrALAZINE, labetalol, calcium carbonate, sodium chloride flush, sodium chloride, ondansetron **OR** ondansetron, polyethylene glycol, acetaminophen **OR** acetaminophen, glucose, dextrose bolus **OR** dextrose bolus, glucagon (rDNA), dextrose, oxyCODONE-acetaminophen, glucose, dextrose bolus **OR** dextrose bolus, glucagon (rDNA), dextrose  I/O last 3 completed shifts:  In: -   Out: 10 [Blood:10]  No intake/output data recorded.    Intake/Output Summary (Last 24 hours) at 6/25/2025 0709  Last data filed at 6/24/2025 1044  Gross per 24 hour   Intake --   Output 10 ml   Net -10 ml       CBC: No results for input(s): \"WBC\", \"HGB\", \"PLT\" in the last 72 hours.    BMP:    Recent Labs     06/23/25  0210 06/24/25  0237    136   K 4.5 4.2   CL 97* 
Nephrology Progress Note        [address] [place] [address]  Phone: [phone]  Office Hours: 8:30AM - 4:30PM  Monday - Friday 6/22/2025 7:43 AM  Subjective:   Admit Date: 6/20/2025  PCP: José Luis Izquierdo MD  Interval History:   Doing ok  S/p HD yesterday  BP elevated    Diet: ADULT DIET; Regular; 5 carb choices (75 gm/meal); Low Fat/Low Chol/High Fiber/ERLIN; Low Sodium (2 gm); Low Potassium (Less than 3000 mg/day)      Data:   Scheduled Meds:   atorvastatin  40 mg Oral Nightly    aspirin  81 mg Oral Daily    sodium chloride flush  5-40 mL IntraVENous 2 times per day    enoxaparin  30 mg SubCUTAneous Daily    clopidogrel  75 mg Oral Daily    hydrALAZINE  25 mg Oral BID    pantoprazole  40 mg Oral Daily    rOPINIRole  0.25 mg Oral Nightly    insulin lispro  0-4 Units SubCUTAneous 4x Daily AC & HS    furosemide  40 mg IntraVENous BID    insulin lispro  0-4 Units SubCUTAneous 4x Daily AC & HS    traZODone  150 mg Oral Nightly     Continuous Infusions:   sodium chloride      dextrose      dextrose       PRN Meds:sodium chloride flush, sodium chloride, ondansetron **OR** ondansetron, polyethylene glycol, acetaminophen **OR** acetaminophen, glucose, dextrose bolus **OR** dextrose bolus, glucagon (rDNA), dextrose, oxyCODONE-acetaminophen, glucose, dextrose bolus **OR** dextrose bolus, glucagon (rDNA), dextrose  I/O last 3 completed shifts:  In: 500   Out: 3000   No intake/output data recorded.    Intake/Output Summary (Last 24 hours) at 6/22/2025 0743  Last data filed at 6/21/2025 1225  Gross per 24 hour   Intake 500 ml   Output 3000 ml   Net -2500 ml       CBC:   Recent Labs     06/20/25  1823 06/21/25  0157 06/22/25  0023   WBC 6.5 6.5 7.1   HGB 11.4* 10.7* 10.8*    179 213       BMP:    Recent Labs     06/20/25  1823 06/21/25  0157 06/22/25  0023   * 138 137   K 3.5 4.1 3.8   CL 91* 97* 96*   CO2 27 27 25   BUN 28* 41* 32*   CREATININE 4.8* 5.7* 5.1*   GLUCOSE 129* 213* 200*   CALCIUM 
Outpatient Pharmacy Progress Note for Meds-to-Beds    Total number of Prescriptions Filled: 4    Additional Documentation:  Medication(s) were delivered to the patient's room prior to discharge      Thank you for letting us serve your patients.  [address] [address]    Phone: [phone]    Fax: [phone]        
Patient in dialysis. Wound care to attempt a later time . Electronically signed by Rsoie Salvador RN on 6/23/2025 at 1:28 PM    
Procedure note  Left cardiac catheterization selective coronary angiography  PTCA and stenting of the LAD    Indication  Acute coronary syndrome    Findings  .  Old is noted patient vessels are heavily calcified  There are multiple stents in LAD and circumflex  The LAD has a 90% stenosis in its mid segment at the outflow of the stent which was placed in the mid LAD which was reduced to 0% with angioplasty and stenting there was a stentless area between the 2 stent this will area was covered with a 3.5 x 18 stent and dilated high-pressure  The KELLY flow was 3 before and after the PCI  The circumflex vessel is a calcified vessel has stents the distal stents in the PL branch is occluded which is chronically occluded  The right coronary artery is calcified  The PDA has about 80% stenosis which is a chronic lesion  The right coronary artery is a dominant vessel    Assessment plan  Will continue with DAPT  Continue with risk factor modification  
Pt received 2.5hr of 3hr tx. AVF accessed without issue. Pt showed sxs of clotting. Dr. James notified and new orders for heparin received. Pt given 4000units of heparin via avf. Pt c/o nausea. Zofran given. Pt requested to come off 30min early d/t not feeling well. Tx stopped and Dr. James notified. 2.4L removed. Blood returned without incident. Pressure to cannulation sites held and hemostasis achieved.      Patient Name: Yovanny Levine  Patient : 1975  MRN: [id number]     Acct: [id number]  Date of Admission: 2025  Room/Bed: 4121/4121-A  Code Status:  Full Code  Allergies:   Allergies   Allergen Reactions    Adhesive Tape Rash    Doxycycline Nausea And Vomiting    Reglan [Metoclopramide] Anxiety    Vancomycin Itching     Diagnosis:    Patient Active Problem List   Diagnosis    Hiatal hernia    Diabetes mellitus type 2, improved controlled    Diabetic neuropathy (HCC)    Panic attack    Anxiety associated with depression    Neck pain    DANIELA (obstructive sleep apnea)    Urinary frequency    Bilateral carpal tunnel syndrome- left worse than right    Hyperlipidemia with target LDL less than 100    Essential hypertension    Bronchitis    Osteomyelitis (HCC)    Abscess    Periumbilical hernia    Sepsis (HCC)    Cellulitis of left foot    Constipation    Intractable nausea and vomiting    Nausea and vomiting    Acute renal failure (ARF)    Cellulitis    Cellulitis of left arm    Cellulitis, scrotum    Acute epididymitis    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HCC)    Recurrent major depressive disorder, in full remission    Generalized anxiety disorder    Necrotizing fasciitis (HCC)    Syncope    Right groin wound    Nephrotic syndrome    Generalized edema    Stage 3b chronic kidney disease (HCC)    Diabetic nephropathy associated with type 2 diabetes mellitus (HCC)    Hypoalbuminemia    Chronic kidney disease, stage IV (severe) (HCC)    FH: CAD (coronary artery disease)    ASCVD (arteriosclerotic cardiovascular disease)    
Pt tolerated 3hr tx well. AVF accessed without issue. 2.5L removed. Blood returned without incident.      Patient Name: Yovanny Levine  Patient : 1975  MRN: [id number]     Acct: [id number]  Date of Admission: 2025  Room/Bed: 4121/4121-A  Code Status:  Full Code  Allergies:   Allergies   Allergen Reactions    Adhesive Tape Rash    Doxycycline Nausea And Vomiting    Reglan [Metoclopramide] Anxiety    Vancomycin Itching     Diagnosis:    Patient Active Problem List   Diagnosis    Hiatal hernia    Diabetes mellitus type 2, improved controlled    Diabetic neuropathy (HCC)    Panic attack    Anxiety associated with depression    Neck pain    DANIELA (obstructive sleep apnea)    Urinary frequency    Bilateral carpal tunnel syndrome- left worse than right    Hyperlipidemia with target LDL less than 100    Essential hypertension    Bronchitis    Osteomyelitis (HCC)    Abscess    Periumbilical hernia    Sepsis (HCC)    Cellulitis of left foot    Constipation    Intractable nausea and vomiting    Nausea and vomiting    Acute renal failure (ARF)    Cellulitis    Cellulitis of left arm    Cellulitis, scrotum    Acute epididymitis    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HCC)    Recurrent major depressive disorder, in full remission    Generalized anxiety disorder    Necrotizing fasciitis (HCC)    Syncope    Right groin wound    Nephrotic syndrome    Generalized edema    Stage 3b chronic kidney disease (HCC)    Diabetic nephropathy associated with type 2 diabetes mellitus (HCC)    Hypoalbuminemia    Chronic kidney disease, stage IV (severe) (HCC)    FH: CAD (coronary artery disease)    ASCVD (arteriosclerotic cardiovascular disease)    Other proteinuria    Chest pain    CARMEN (acute kidney injury)    Anemia    Chest tightness    NSTEMI (non-ST elevated myocardial infarction) (HCC)    Diabetic foot ulcer with osteomyelitis (HCC)    ESRD (end stage renal disease) (HCC)    Problem with vascular access    Acute on chronic respiratory failure 
Spiritual Health History and Assessment/Progress Note  [facility]    Spiritual/Emotional Needs,  ,  ,      Name: Yovanny Levine MRN: [id number]    Age: 50 y.o.     Sex: male   Language: English   Denominational: Moravian   Atypical chest pain     Date: 6/24/2025            Total Time Calculated: 10 min              Spiritual Assessment began in [facility] ICU STEPDOWN        Referral/Consult From: Rounding   Encounter Overview/Reason: Spiritual/Emotional Needs  Service Provided For: Patient and family together    Nicolasa, Belief, Meaning:   Patient identifies as spiritual, is connected with a nicolasa tradition or spiritual practice, and has beliefs or practices that help with coping during difficult times  Family/Friends have beliefs or practices that help with coping during difficult times      Importance and Influence:  Patient has spiritual/personal beliefs that influence decisions regarding their health  Family/Friends have spiritual/personal beliefs that influence decisions regarding the patient's health    Community:  Patient is connected with a spiritual community and feels well-supported. Support system includes: Children and Extended family  Family/Friends are connected with a spiritual community: and feel well-supported. Support system includes: Parent/s and Extended family    Assessment and Plan of Care:     Patient Interventions include: Facilitated expression of thoughts and feelings, Explored spiritual coping/struggle/distress, Engaged in theological reflection, and Affirmed coping skills/support systems  Family/Friends Interventions include: Facilitated expression of thoughts and feelings    Patient Plan of Care: Spiritual Care available upon further referral  Family/Friends Plan of Care: Spiritual Care available upon further referral    Electronically signed by Chaplain Melvin on 6/24/2025 at 11:58 AM   
Spiritual Health History and Assessment/Progress Note  [facility]    Spiritual/Emotional Needs,  ,  ,      Name: Yovanny Levine MRN: [id number]    Age: 50 y.o.     Sex: male   Language: English   Worship: [employer]   Atypical chest pain     Date: 6/25/2025            Total Time Calculated: 15 min              Spiritual Assessment [facility]        Referral/Consult From: Rounding   Encounter Overview/Reason: Spiritual/Emotional Needs  Service Provided For: Patient    Nicolasa, Belief, Meaning:   Patient identifies as spiritual, is connected with a nicolasa tradition or spiritual practice, and has beliefs or practices that help with coping during difficult times  Family/Friends No family/friends present      Importance and Influence:  Patient has spiritual/personal beliefs that influence decisions regarding their health  Family/Friends No family/friends present    Community:  Patient is connected with a spiritual community and feels well-supported. Support system includes: Children and Nicolasa Community  Family/Friends No family/friends present    Assessment and Plan of Care:     Patient Interventions include: Facilitated expression of thoughts and feelings, Explored spiritual coping/struggle/distress, Engaged in theological reflection, and Affirmed coping skills/support systems  Family/Friends Interventions include: No family/friends present    Patient Plan of Care: Spiritual Care available upon further referral  Family/Friends Plan of Care: No family/friends present    Electronically signed by Chaplain Melvin on 6/25/2025 at 4:15 PM   
clarification.   Diet ADULT DIET; Regular; 5 carb choices (75 gm/meal); Low Fat/Low Chol/High Fiber/ERLIN; Low Sodium (2 gm); Low Potassium (Less than 3000 mg/day)   DVT Prophylaxis [] Lovenox, []  Heparin, [] SCDs, [] Ambulation,  [] Eliquis, [] Xarelto  [] Coumadin   Code Status Full Code   Disposition From: Home  Expected Disposition: Likely home  Estimated Date of Discharge: Tomorrow  Patient requires continued admission due to depend on cardiology clearance, echocardiogram pending for tomorrow   Surrogate Decision Maker/ POA      Subjective:     Chief Complaint: Chest Pain (Started in the middle of dialysis. Pt states he was asleep and the chest pain woke him up. Constant 6/10 pain at this time. Hx of stents last heart cath about a month ago. )       The patient was seen at the bedside patient does not report any chest pain patient is resting comfortably echocardiogram is pending all plan discussed at bedside denies any nausea vomiting diarrhea constipation dizziness lightheadedness does feel weak every time he gets dialysis last dialysis was yesterday      Review of Systems:    Review of Systems    Negative except above  Objective:   No intake or output data in the 24 hours ending 06/22/25 1333     Vitals:   Vitals:    06/22/25 1235   BP:    Pulse:    Resp:    Temp:    SpO2: 97%       Physical Exam:   Physical Exam  Vitals reviewed.   Constitutional:       Appearance: Normal appearance. He is normal weight.   HENT:      Head: Normocephalic.      Nose: Nose normal.      Mouth/Throat:      Mouth: Mucous membranes are moist.   Eyes:      Conjunctiva/sclera: Conjunctivae normal.      Pupils: Pupils are equal, round, and reactive to light.   Cardiovascular:      Rate and Rhythm: Normal rate and regular rhythm.      Pulses: Normal pulses.      Heart sounds: Normal heart sounds. No murmur heard.  Pulmonary:      Effort: Pulmonary effort is normal.      Breath sounds: Normal breath sounds. No wheezing, rhonchi or rales. 
Before each treatment:   Dialysis Machine No.: [id number]  RO Machine Number: [id number]  Dialyzer Lot No.: [id number]  RO Machine Log Sheet Completed: Yes  Machine Alarm Self Test: Completed;Passed (06/25/25 0650)     Air Foam Detector: Tested, Proper Function, pH Reading  Extracorporeal Circuit Tested for Integrity: Yes  Machine Conductivity: 13.5  Manual Conductivity: 13.6  Machine Ph: 7.4  Bicarbonate Concentrate Lot No.: [id number]  Acid Concentrate Lot No.: [id number]  Manual Ph: 7.4  Bleach Test (Neg): Yes  Bath Temperature: 96.8 °F (36 °C)  Tubing Lot#: [id number]  Conductivity Meter Serial #: [id number]  All Connections Secure?: Yes  Venous Parameters Set?: Yes  Arterial Parameters Set?: Yes  Saline Line Double Clamped?: Yes  Air Foam Detector Engaged?: Yes  Machine Functioning Alarm Free? Yes  Prime Given: 200ml    Chlorine Testing - Before each treatment and every 4 hours:   Treatment  Time On: 0855  Time Off: 1157  Treatment Goal: 2-3L in 3hrs  Weight - Scale: 116 kg (255 lb 11.2 oz) (06/24/25 0326)  1st check: less than 0.1 ppm at: 0700 hours  2nd check: less than 0.1 ppm at:  1030 hours  3rd check: Not Applicable  (if greater than 0.1 ppm, then check every 30 minutes from secondary)    Access Flows and Pressures  Patient Vitals for the past 8 hrs:   Blood Flow Rate (ml/min) Ultrafiltration Rate (ml/hr) Ultrafiltration Removed (ml) Arterial Pressure (mmHg) Venous Pressure (mmHg) TMP DFR Comments Access Visible   06/25/25 0855 350 ml/min 1000 ml/hr -- -70 mmHg 150 90 700 tx started Yes   06/25/25 0900 350 ml/min 1000 ml/hr 75 ml -70 mmHg 150 90 700 lines secure Yes   06/25/25 0915 350 ml/min 1000 ml/hr 335 ml -90 mmHg 160 100 700 no needs voiced Yes   06/25/25 0930 350 ml/min 1000 ml/hr 564 ml -100 mmHg 160 90 700 lines secure Yes   06/25/25 0945 350 ml/min 1000 ml/hr 826 ml -110 mmHg 170 90 700 pt resting Yes   06/25/25 1000 350 ml/min 1000 ml/hr 1065 ml -110 mmHg 170 100 700 no needs Yes   06/25/25 1015 350 ml/min

## 2025-06-26 ENCOUNTER — HOSPITAL ENCOUNTER (OUTPATIENT)
Dept: WOUND CARE | Age: 50
Discharge: HOME | End: 2025-06-26
Attending: STUDENT IN AN ORGANIZED HEALTH CARE EDUCATION/TRAINING PROGRAM

## 2025-06-26 ENCOUNTER — HOSPITAL ENCOUNTER (EMERGENCY)
Age: 50
Discharge: HOME | End: 2025-06-26
Attending: EMERGENCY MEDICINE
Payer: MEDICARE

## 2025-06-26 VITALS
RESPIRATION RATE: 15 BRPM | HEART RATE: 68 BPM | OXYGEN SATURATION: 98 % | SYSTOLIC BLOOD PRESSURE: 130 MMHG | TEMPERATURE: 98.8 F | DIASTOLIC BLOOD PRESSURE: 74 MMHG

## 2025-06-26 DIAGNOSIS — R04.0 EPISTAXIS: Primary | ICD-10-CM

## 2025-06-26 PROCEDURE — 6370000000 HC RX 637 (ALT 250 FOR IP): Performed by: EMERGENCY MEDICINE

## 2025-06-26 PROCEDURE — 99283 EMERGENCY DEPT VISIT LOW MDM: CPT

## 2025-06-26 RX ORDER — OXYMETAZOLINE HYDROCHLORIDE 0.05 G/100ML
2 SPRAY NASAL ONCE
Status: COMPLETED | OUTPATIENT
Start: 2025-06-26 | End: 2025-06-26

## 2025-06-26 RX ADMIN — OXYMETAZOLINE HYDROCHLORIDE 2 SPRAY: 0.5 SPRAY NASAL at 05:16

## 2025-06-26 ASSESSMENT — PAIN DESCRIPTION - LOCATION: LOCATION: FOOT

## 2025-06-26 ASSESSMENT — PAIN SCALES - GENERAL: PAINLEVEL_OUTOF10: 7

## 2025-06-26 ASSESSMENT — PAIN - FUNCTIONAL ASSESSMENT: PAIN_FUNCTIONAL_ASSESSMENT: 0-10

## 2025-06-26 NOTE — ED PROVIDER NOTES
exam.  Patient also on oxygen chronically contributing.  Afrin was administered and nasal clamp applied for 15 minutes.    Patient with hemostasis.  Does not require cautery or other intervention at this time.  Given return precautions.    Discharged in stable condition.    History from : Patient    Limitations to history : None    Patient was given the following medications:  Medications   oxymetazoline (AFRIN) 0.05 % nasal spray 2 spray (2 sprays Each Nostril Given 6/26/25 0512)       Independent Imaging Interpretation by me:     EKG (if obtained): (All EKG's are interpreted by myself in the absence of a cardiologist)     Chronic conditions affecting care: CAD on plavix    Discussion with Other Profesionals : None    Social Determinants : None    Disposition Considerations (tests considered but not done, Shared Decision Making, Pt Expectation of Test or Tx.):     Appropriate for outpatient management      I am the Primary Clinician of Record.                  Clinical Impression:  1. Epistaxis      (Please note that portions of this note may have been completed with a voice recognition program. Efforts were made to edit the dictations but occasionally words are mis-transcribed.)    MD STEVIE Wilson Ryan, MD  06/26/25 0531

## 2025-06-26 NOTE — PATIENT INSTRUCTIONS
PHYSICIAN ORDERS AND DISCHARGE INSTRUCTIONS     Wound Care Notes:  Sees Dr Kumar.  surgical shoes with pegassist to offload right plantar wound 3/13/25                  Applied for Kerecis grafts on 24  Donated Kerecis applied to left lateral foot 24                             Orders for this week: 2025    Abdomen Burn : Wash with soap and water, pat dry.  Apply Betadine to wound bed   Cover with ca alginate and gentac w/ tegaderm  Change Daily     Head: Shave area and apply betadine and gentac       Plan: HBO workup started 9/10/24.   [facility] discharge due to not home bound 24.   Toenails 2025.        Follow Up Instructions: 1 weeks   Primary Wound Care Provider: Shelly Rubalcava CNP  Call  for any questions or concerns.  Central [phone] for imaging lab work

## 2025-06-27 LAB
EKG ATRIAL RATE: 62 BPM
EKG DIAGNOSIS: NORMAL
EKG P AXIS: -6 DEGREES
EKG P-R INTERVAL: 218 MS
EKG Q-T INTERVAL: 472 MS
EKG QRS DURATION: 98 MS
EKG QTC CALCULATION (BAZETT): 479 MS
EKG R AXIS: 51 DEGREES
EKG T AXIS: 102 DEGREES
EKG VENTRICULAR RATE: 62 BPM

## 2025-06-27 PROCEDURE — 93010 ELECTROCARDIOGRAM REPORT: CPT | Performed by: INTERNAL MEDICINE

## 2025-07-03 ENCOUNTER — HOSPITAL ENCOUNTER (OUTPATIENT)
Dept: WOUND CARE | Age: 50
Discharge: HOME | End: 2025-07-03
Attending: STUDENT IN AN ORGANIZED HEALTH CARE EDUCATION/TRAINING PROGRAM
Payer: MEDICARE

## 2025-07-03 VITALS
RESPIRATION RATE: 16 BRPM | SYSTOLIC BLOOD PRESSURE: 154 MMHG | DIASTOLIC BLOOD PRESSURE: 68 MMHG | HEART RATE: 67 BPM | TEMPERATURE: 97.9 F

## 2025-07-03 DIAGNOSIS — L97.412 DIABETIC ULCER OF RIGHT MIDFOOT ASSOCIATED WITH TYPE 2 DIABETES MELLITUS, WITH FAT LAYER EXPOSED (HCC): Primary | ICD-10-CM

## 2025-07-03 DIAGNOSIS — L98.492 ULCER OF ABDOMEN WALL WITH FAT LAYER EXPOSED (HCC): ICD-10-CM

## 2025-07-03 DIAGNOSIS — T21.22XD BURN OF SECOND DEGREE OF ABDOMINAL WALL, SUBSEQUENT ENCOUNTER: ICD-10-CM

## 2025-07-03 DIAGNOSIS — E11.621 DIABETIC ULCER OF RIGHT MIDFOOT ASSOCIATED WITH TYPE 2 DIABETES MELLITUS, WITH FAT LAYER EXPOSED (HCC): Primary | ICD-10-CM

## 2025-07-03 DIAGNOSIS — G89.4 CHRONIC PAIN SYNDROME: ICD-10-CM

## 2025-07-03 PROCEDURE — 16020 DRESS/DEBRID P-THICK BURN S: CPT

## 2025-07-03 PROCEDURE — 16020 DRESS/DEBRID P-THICK BURN S: CPT | Performed by: NURSE PRACTITIONER

## 2025-07-03 PROCEDURE — 11719 TRIM NAIL(S) ANY NUMBER: CPT

## 2025-07-03 PROCEDURE — 11719 TRIM NAIL(S) ANY NUMBER: CPT | Performed by: NURSE PRACTITIONER

## 2025-07-03 PROCEDURE — 11042 DBRDMT SUBQ TIS 1ST 20SQCM/<: CPT

## 2025-07-03 PROCEDURE — 11042 DBRDMT SUBQ TIS 1ST 20SQCM/<: CPT | Performed by: NURSE PRACTITIONER

## 2025-07-03 RX ORDER — LIDOCAINE HYDROCHLORIDE 20 MG/ML
JELLY TOPICAL ONCE
OUTPATIENT
Start: 2025-07-03 | End: 2025-07-03

## 2025-07-03 RX ORDER — GINSENG 100 MG
CAPSULE ORAL ONCE
OUTPATIENT
Start: 2025-07-03 | End: 2025-07-03

## 2025-07-03 RX ORDER — TRIAMCINOLONE ACETONIDE 1 MG/G
OINTMENT TOPICAL ONCE
OUTPATIENT
Start: 2025-07-03 | End: 2025-07-03

## 2025-07-03 RX ORDER — OXYCODONE AND ACETAMINOPHEN 7.5; 325 MG/1; MG/1
1 TABLET ORAL EVERY 8 HOURS PRN
Qty: 90 TABLET | Refills: 0 | Status: SHIPPED | OUTPATIENT
Start: 2025-07-03 | End: 2025-08-02

## 2025-07-03 RX ORDER — LIDOCAINE 40 MG/G
CREAM TOPICAL ONCE
OUTPATIENT
Start: 2025-07-03 | End: 2025-07-03

## 2025-07-03 RX ORDER — LIDOCAINE HYDROCHLORIDE 40 MG/ML
SOLUTION TOPICAL ONCE
OUTPATIENT
Start: 2025-07-03 | End: 2025-07-03

## 2025-07-03 RX ORDER — SILVER SULFADIAZINE 10 MG/G
CREAM TOPICAL ONCE
OUTPATIENT
Start: 2025-07-03 | End: 2025-07-03

## 2025-07-03 RX ORDER — NEOMYCIN/BACITRACIN/POLYMYXINB 3.5-400-5K
OINTMENT (GRAM) TOPICAL ONCE
OUTPATIENT
Start: 2025-07-03 | End: 2025-07-03

## 2025-07-03 RX ORDER — SODIUM CHLOR/HYPOCHLOROUS ACID 0.033 %
SOLUTION, IRRIGATION IRRIGATION ONCE
OUTPATIENT
Start: 2025-07-03 | End: 2025-07-03

## 2025-07-03 RX ORDER — MUPIROCIN 2 %
OINTMENT (GRAM) TOPICAL ONCE
OUTPATIENT
Start: 2025-07-03 | End: 2025-07-03

## 2025-07-03 RX ORDER — GENTAMICIN SULFATE 1 MG/G
OINTMENT TOPICAL ONCE
OUTPATIENT
Start: 2025-07-03 | End: 2025-07-03

## 2025-07-03 RX ORDER — CLOBETASOL PROPIONATE 0.5 MG/G
OINTMENT TOPICAL ONCE
OUTPATIENT
Start: 2025-07-03 | End: 2025-07-03

## 2025-07-03 RX ORDER — LIDOCAINE 50 MG/G
OINTMENT TOPICAL ONCE
OUTPATIENT
Start: 2025-07-03 | End: 2025-07-03

## 2025-07-03 RX ORDER — BACITRACIN ZINC AND POLYMYXIN B SULFATE 500; 1000 [USP'U]/G; [USP'U]/G
OINTMENT TOPICAL ONCE
OUTPATIENT
Start: 2025-07-03 | End: 2025-07-03

## 2025-07-03 RX ORDER — BETAMETHASONE DIPROPIONATE 0.5 MG/G
CREAM TOPICAL ONCE
OUTPATIENT
Start: 2025-07-03 | End: 2025-07-03
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
## 2025-07-03 NOTE — PATIENT INSTRUCTIONS
PHYSICIAN ORDERS AND DISCHARGE INSTRUCTIONS     Wound Care Notes:  Sees Dr Kumar.  surgical shoes with pegassist to offload right plantar wound 3/13/25                  Applied for Kerecis grafts on 24  Donated Kerecis applied to left lateral foot 24                             Orders for this week: 7/3/2025    Abdomen Burn : Wash with soap and water, pat dry.  Apply Betadine to wound bed   Cover with ca alginate and gentac w/ tegaderm  Change Daily     Right Lower Leg- Clean with Anasept Spray and Gauze  Apply Collactive Ag to wound bed.  Cover with Zetuvit Plus Silicone Border  Leave in place for 1 week      Plan: HBO workup started 9/10/24.   [facility] discharge due to not home bound 24.   Toenails 2025, 7/3/25        Follow Up Instructions: 1 weeks   Primary Wound Care Provider: Shelly Rubalcava CNP  Call  for any questions or concerns.  Central [phone] for imaging lab work

## 2025-07-10 ENCOUNTER — HOSPITAL ENCOUNTER (OUTPATIENT)
Dept: WOUND CARE | Age: 50
Discharge: HOME | End: 2025-07-10
Attending: STUDENT IN AN ORGANIZED HEALTH CARE EDUCATION/TRAINING PROGRAM

## 2025-07-10 NOTE — PATIENT INSTRUCTIONS
PHYSICIAN ORDERS AND DISCHARGE INSTRUCTIONS     Wound Care Notes:  Sees Dr Kumar.  surgical shoes with pegassist to offload right plantar wound 3/13/25                  Applied for Kerecis grafts on 24  Donated Kerecis applied to left lateral foot 24                             Orders for this week: 7/10/2025    Abdomen Burn : Wash with soap and water, pat dry.  Apply Betadine to wound bed   Cover with ca alginate and gentac w/ tegaderm  Change Daily     Right Lower Leg- Clean with Anasept Spray and Gauze  Apply Collactive Ag to wound bed.  Cover with Zetuvit Plus Silicone Border  Leave in place for 1 week      Plan: HBO workup started 9/10/24.   [facility] discharge due to not home bound 24.   Toenails 2025, 7/3/25      Follow Up Instructions: 1 weeks   Primary Wound Care Provider: Shelly Rubalcava CNP  Call  for any questions or concerns.  Central [phone] for imaging lab work

## 2025-07-13 ENCOUNTER — APPOINTMENT (OUTPATIENT)
Dept: GENERAL RADIOLOGY | Age: 50
DRG: 640 | End: 2025-07-13
Payer: MEDICARE

## 2025-07-13 ENCOUNTER — HOSPITAL ENCOUNTER (INPATIENT)
Age: 50
LOS: 2 days | Discharge: HOME | DRG: 640 | End: 2025-07-15
Attending: EMERGENCY MEDICINE | Admitting: INTERNAL MEDICINE
Payer: MEDICARE

## 2025-07-13 ENCOUNTER — APPOINTMENT (OUTPATIENT)
Dept: CT IMAGING | Age: 50
DRG: 640 | End: 2025-07-13
Payer: MEDICARE

## 2025-07-13 DIAGNOSIS — R06.00 DYSPNEA, UNSPECIFIED TYPE: Primary | ICD-10-CM

## 2025-07-13 DIAGNOSIS — J90 PLEURAL EFFUSION: ICD-10-CM

## 2025-07-13 DIAGNOSIS — R10.84 GENERALIZED ABDOMINAL PAIN: ICD-10-CM

## 2025-07-13 LAB
ALBUMIN SERPL-MCNC: 3.7 G/DL (ref 3.4–5)
ALBUMIN/GLOB SERPL: 1.2 {RATIO} (ref 1.1–2.2)
ALP SERPL-CCNC: 76 U/L (ref 40–129)
ALT SERPL-CCNC: 9 U/L (ref 10–40)
ANION GAP SERPL CALCULATED.3IONS-SCNC: 21 MMOL/L (ref 9–17)
ARTERIAL PATENCY WRIST A: ABNORMAL
AST SERPL-CCNC: 13 U/L (ref 15–37)
BILIRUB SERPL-MCNC: 0.5 MG/DL (ref 0–1)
BODY TEMPERATURE: 37
BUN SERPL-MCNC: 66 MG/DL (ref 7–20)
CALCIUM SERPL-MCNC: 8.3 MG/DL (ref 8.3–10.6)
CHLORIDE SERPL-SCNC: 95 MMOL/L (ref 99–110)
CO2 SERPL-SCNC: 22 MMOL/L (ref 21–32)
COHGB MFR BLD: 1.1 % (ref 0.5–1.5)
CREAT SERPL-MCNC: 9.2 MG/DL (ref 0.9–1.3)
EKG ATRIAL RATE: 59 BPM
EKG DIAGNOSIS: NORMAL
EKG P AXIS: 11 DEGREES
EKG P-R INTERVAL: 214 MS
EKG Q-T INTERVAL: 494 MS
EKG QRS DURATION: 102 MS
EKG QTC CALCULATION (BAZETT): 489 MS
EKG R AXIS: 43 DEGREES
EKG T AXIS: 90 DEGREES
EKG VENTRICULAR RATE: 59 BPM
ERYTHROCYTE [DISTWIDTH] IN BLOOD BY AUTOMATED COUNT: 15.2 % (ref 11.7–14.9)
GFR, ESTIMATED: 6 ML/MIN/1.73M2
GLUCOSE BLD-MCNC: 223 MG/DL (ref 74–99)
GLUCOSE SERPL-MCNC: 166 MG/DL (ref 74–99)
HCO3 VENOUS: 23.2 MMOL/L (ref 22–29)
HCT VFR BLD AUTO: 27.6 % (ref 42–52)
HGB BLD-MCNC: 9.1 G/DL (ref 13.5–18)
LACTATE BLDV-SCNC: 0.9 MMOL/L (ref 0.4–2)
LIPASE SERPL-CCNC: 39 U/L (ref 13–60)
MAGNESIUM SERPL-MCNC: 2.2 MG/DL (ref 1.8–2.4)
MCH RBC QN AUTO: 31.7 PG (ref 27–31)
MCHC RBC AUTO-ENTMCNC: 33 G/DL (ref 32–36)
MCV RBC AUTO: 96.2 FL (ref 78–100)
METHEMOGLOBIN: 0.2 % (ref 0.5–1.5)
NEGATIVE BASE EXCESS, VEN: 3 MMOL/L (ref 0–3)
OXYHGB MFR BLD: 72.5 %
PCO2 VENOUS: 47.1 MM HG (ref 38–54)
PH VENOUS: 7.31 (ref 7.32–7.43)
PHOSPHATE SERPL-MCNC: 9.5 MG/DL (ref 2.5–4.9)
PLATELET # BLD AUTO: 143 K/UL (ref 140–440)
PMV BLD AUTO: 10.4 FL (ref 7.5–11.1)
PO2 VENOUS: 46.5 MM HG (ref 23–48)
POTASSIUM SERPL-SCNC: 4.4 MMOL/L (ref 3.5–5.1)
PROCALCITONIN SERPL-MCNC: 0.69 NG/ML
PROT SERPL-MCNC: 6.9 G/DL (ref 6.4–8.2)
RBC # BLD AUTO: 2.87 M/UL (ref 4.6–6.2)
SODIUM SERPL-SCNC: 138 MMOL/L (ref 136–145)
TROPONIN I SERPL HS-MCNC: 145 NG/L (ref 0–22)
TROPONIN I SERPL HS-MCNC: 146 NG/L (ref 0–22)
WBC OTHER # BLD: 7.6 K/UL (ref 4–10.5)

## 2025-07-13 PROCEDURE — 74177 CT ABD & PELVIS W/CONTRAST: CPT

## 2025-07-13 PROCEDURE — 6360000002 HC RX W HCPCS: Performed by: EMERGENCY MEDICINE

## 2025-07-13 PROCEDURE — 2500000003 HC RX 250 WO HCPCS: Performed by: INTERNAL MEDICINE

## 2025-07-13 PROCEDURE — 82805 BLOOD GASES W/O2 SATURATION: CPT

## 2025-07-13 PROCEDURE — 1200000000 HC SEMI PRIVATE

## 2025-07-13 PROCEDURE — 96374 THER/PROPH/DIAG INJ IV PUSH: CPT

## 2025-07-13 PROCEDURE — 83690 ASSAY OF LIPASE: CPT

## 2025-07-13 PROCEDURE — 93005 ELECTROCARDIOGRAM TRACING: CPT | Performed by: EMERGENCY MEDICINE

## 2025-07-13 PROCEDURE — 96375 TX/PRO/DX INJ NEW DRUG ADDON: CPT

## 2025-07-13 PROCEDURE — 83735 ASSAY OF MAGNESIUM: CPT

## 2025-07-13 PROCEDURE — 84100 ASSAY OF PHOSPHORUS: CPT

## 2025-07-13 PROCEDURE — 80053 COMPREHEN METABOLIC PANEL: CPT

## 2025-07-13 PROCEDURE — 36415 COLL VENOUS BLD VENIPUNCTURE: CPT

## 2025-07-13 PROCEDURE — 83605 ASSAY OF LACTIC ACID: CPT

## 2025-07-13 PROCEDURE — 84484 ASSAY OF TROPONIN QUANT: CPT

## 2025-07-13 PROCEDURE — 87040 BLOOD CULTURE FOR BACTERIA: CPT

## 2025-07-13 PROCEDURE — 71045 X-RAY EXAM CHEST 1 VIEW: CPT

## 2025-07-13 PROCEDURE — 6370000000 HC RX 637 (ALT 250 FOR IP): Performed by: INTERNAL MEDICINE

## 2025-07-13 PROCEDURE — 71275 CT ANGIOGRAPHY CHEST: CPT

## 2025-07-13 PROCEDURE — 6360000004 HC RX CONTRAST MEDICATION: Performed by: EMERGENCY MEDICINE

## 2025-07-13 PROCEDURE — 6360000002 HC RX W HCPCS: Performed by: INTERNAL MEDICINE

## 2025-07-13 PROCEDURE — 85027 COMPLETE CBC AUTOMATED: CPT

## 2025-07-13 PROCEDURE — 93010 ELECTROCARDIOGRAM REPORT: CPT | Performed by: INTERNAL MEDICINE

## 2025-07-13 PROCEDURE — 96376 TX/PRO/DX INJ SAME DRUG ADON: CPT

## 2025-07-13 PROCEDURE — 99285 EMERGENCY DEPT VISIT HI MDM: CPT

## 2025-07-13 PROCEDURE — 84145 PROCALCITONIN (PCT): CPT

## 2025-07-13 PROCEDURE — 82962 GLUCOSE BLOOD TEST: CPT

## 2025-07-13 RX ORDER — FENTANYL CITRATE 50 UG/ML
50 INJECTION, SOLUTION INTRAMUSCULAR; INTRAVENOUS ONCE
Status: COMPLETED | OUTPATIENT
Start: 2025-07-13 | End: 2025-07-13

## 2025-07-13 RX ORDER — HEPARIN SODIUM 5000 [USP'U]/ML
5000 INJECTION, SOLUTION INTRAVENOUS; SUBCUTANEOUS EVERY 8 HOURS SCHEDULED
Status: DISCONTINUED | OUTPATIENT
Start: 2025-07-13 | End: 2025-07-15 | Stop reason: HOSPADM

## 2025-07-13 RX ORDER — CALCITRIOL 0.25 UG/1
0.5 CAPSULE, LIQUID FILLED ORAL 2 TIMES DAILY
Status: DISCONTINUED | OUTPATIENT
Start: 2025-07-13 | End: 2025-07-15 | Stop reason: HOSPADM

## 2025-07-13 RX ORDER — ONDANSETRON 2 MG/ML
4 INJECTION INTRAMUSCULAR; INTRAVENOUS ONCE
Status: COMPLETED | OUTPATIENT
Start: 2025-07-13 | End: 2025-07-13

## 2025-07-13 RX ORDER — HYDRALAZINE HYDROCHLORIDE 25 MG/1
25 TABLET, FILM COATED ORAL 2 TIMES DAILY
Status: DISCONTINUED | OUTPATIENT
Start: 2025-07-13 | End: 2025-07-15 | Stop reason: HOSPADM

## 2025-07-13 RX ORDER — BUDESONIDE AND FORMOTEROL FUMARATE DIHYDRATE 160; 4.5 UG/1; UG/1
2 AEROSOL RESPIRATORY (INHALATION)
Status: DISCONTINUED | OUTPATIENT
Start: 2025-07-13 | End: 2025-07-15 | Stop reason: HOSPADM

## 2025-07-13 RX ORDER — ONDANSETRON 2 MG/ML
4 INJECTION INTRAMUSCULAR; INTRAVENOUS EVERY 6 HOURS PRN
Status: DISCONTINUED | OUTPATIENT
Start: 2025-07-13 | End: 2025-07-15 | Stop reason: HOSPADM

## 2025-07-13 RX ORDER — CLOPIDOGREL BISULFATE 75 MG/1
75 TABLET ORAL DAILY
Status: DISCONTINUED | OUTPATIENT
Start: 2025-07-13 | End: 2025-07-15 | Stop reason: HOSPADM

## 2025-07-13 RX ORDER — SODIUM CHLORIDE 0.9 % (FLUSH) 0.9 %
5-40 SYRINGE (ML) INJECTION EVERY 12 HOURS SCHEDULED
Status: DISCONTINUED | OUTPATIENT
Start: 2025-07-13 | End: 2025-07-15 | Stop reason: HOSPADM

## 2025-07-13 RX ORDER — ISOSORBIDE MONONITRATE 30 MG/1
15 TABLET, EXTENDED RELEASE ORAL DAILY
Status: DISCONTINUED | OUTPATIENT
Start: 2025-07-14 | End: 2025-07-15 | Stop reason: HOSPADM

## 2025-07-13 RX ORDER — ALBUTEROL SULFATE 90 UG/1
2 INHALANT RESPIRATORY (INHALATION) 4 TIMES DAILY PRN
Status: DISCONTINUED | OUTPATIENT
Start: 2025-07-13 | End: 2025-07-15 | Stop reason: HOSPADM

## 2025-07-13 RX ORDER — ONDANSETRON 4 MG/1
4 TABLET, ORALLY DISINTEGRATING ORAL EVERY 8 HOURS PRN
Status: DISCONTINUED | OUTPATIENT
Start: 2025-07-13 | End: 2025-07-15 | Stop reason: HOSPADM

## 2025-07-13 RX ORDER — METOPROLOL TARTRATE 25 MG/1
12.5 TABLET, FILM COATED ORAL 2 TIMES DAILY
Status: DISCONTINUED | OUTPATIENT
Start: 2025-07-13 | End: 2025-07-15 | Stop reason: HOSPADM

## 2025-07-13 RX ORDER — ATORVASTATIN CALCIUM 40 MG/1
40 TABLET, FILM COATED ORAL NIGHTLY
Status: DISCONTINUED | OUTPATIENT
Start: 2025-07-13 | End: 2025-07-15 | Stop reason: HOSPADM

## 2025-07-13 RX ORDER — ACETAMINOPHEN 325 MG/1
650 TABLET ORAL EVERY 6 HOURS PRN
Status: DISCONTINUED | OUTPATIENT
Start: 2025-07-13 | End: 2025-07-15 | Stop reason: HOSPADM

## 2025-07-13 RX ORDER — ROPINIROLE 0.25 MG/1
0.25 TABLET, FILM COATED ORAL NIGHTLY
Status: DISCONTINUED | OUTPATIENT
Start: 2025-07-13 | End: 2025-07-15 | Stop reason: HOSPADM

## 2025-07-13 RX ORDER — TRAZODONE HYDROCHLORIDE 50 MG/1
150 TABLET ORAL NIGHTLY
Status: DISCONTINUED | OUTPATIENT
Start: 2025-07-13 | End: 2025-07-15 | Stop reason: HOSPADM

## 2025-07-13 RX ORDER — ASPIRIN 81 MG/1
81 TABLET, CHEWABLE ORAL DAILY
Status: DISCONTINUED | OUTPATIENT
Start: 2025-07-13 | End: 2025-07-15 | Stop reason: HOSPADM

## 2025-07-13 RX ORDER — POLYETHYLENE GLYCOL 3350 17 G/17G
17 POWDER, FOR SOLUTION ORAL DAILY PRN
Status: DISCONTINUED | OUTPATIENT
Start: 2025-07-13 | End: 2025-07-15 | Stop reason: HOSPADM

## 2025-07-13 RX ORDER — INSULIN GLARGINE 100 [IU]/ML
15 INJECTION, SOLUTION SUBCUTANEOUS NIGHTLY
Status: DISCONTINUED | OUTPATIENT
Start: 2025-07-13 | End: 2025-07-15 | Stop reason: HOSPADM

## 2025-07-13 RX ORDER — SODIUM CHLORIDE 0.9 % (FLUSH) 0.9 %
5-40 SYRINGE (ML) INJECTION PRN
Status: DISCONTINUED | OUTPATIENT
Start: 2025-07-13 | End: 2025-07-15 | Stop reason: HOSPADM

## 2025-07-13 RX ORDER — IOPAMIDOL 755 MG/ML
75 INJECTION, SOLUTION INTRAVASCULAR
Status: COMPLETED | OUTPATIENT
Start: 2025-07-13 | End: 2025-07-13

## 2025-07-13 RX ORDER — SODIUM CHLORIDE 9 MG/ML
INJECTION, SOLUTION INTRAVENOUS PRN
Status: DISCONTINUED | OUTPATIENT
Start: 2025-07-13 | End: 2025-07-15 | Stop reason: HOSPADM

## 2025-07-13 RX ORDER — OXYCODONE AND ACETAMINOPHEN 7.5; 325 MG/1; MG/1
1 TABLET ORAL EVERY 8 HOURS PRN
Status: DISCONTINUED | OUTPATIENT
Start: 2025-07-13 | End: 2025-07-15 | Stop reason: HOSPADM

## 2025-07-13 RX ORDER — PANTOPRAZOLE SODIUM 40 MG/1
40 TABLET, DELAYED RELEASE ORAL DAILY
Status: DISCONTINUED | OUTPATIENT
Start: 2025-07-14 | End: 2025-07-15 | Stop reason: HOSPADM

## 2025-07-13 RX ORDER — ACETAMINOPHEN 650 MG/1
650 SUPPOSITORY RECTAL EVERY 6 HOURS PRN
Status: DISCONTINUED | OUTPATIENT
Start: 2025-07-13 | End: 2025-07-15 | Stop reason: HOSPADM

## 2025-07-13 RX ADMIN — ROPINIROLE HYDROCHLORIDE 0.25 MG: 0.25 TABLET, FILM COATED ORAL at 20:34

## 2025-07-13 RX ADMIN — ONDANSETRON 4 MG: 2 INJECTION INTRAMUSCULAR; INTRAVENOUS at 12:27

## 2025-07-13 RX ADMIN — METOPROLOL TARTRATE 12.5 MG: 25 TABLET, FILM COATED ORAL at 20:34

## 2025-07-13 RX ADMIN — SODIUM CHLORIDE, PRESERVATIVE FREE 10 ML: 5 INJECTION INTRAVENOUS at 20:35

## 2025-07-13 RX ADMIN — TRAZODONE HYDROCHLORIDE 150 MG: 50 TABLET ORAL at 20:33

## 2025-07-13 RX ADMIN — EMPAGLIFLOZIN 10 MG: 10 TABLET, FILM COATED ORAL at 20:37

## 2025-07-13 RX ADMIN — ASPIRIN 81 MG 81 MG: 81 TABLET ORAL at 20:37

## 2025-07-13 RX ADMIN — ATORVASTATIN CALCIUM 40 MG: 40 TABLET, FILM COATED ORAL at 20:34

## 2025-07-13 RX ADMIN — HYDRALAZINE HYDROCHLORIDE 25 MG: 25 TABLET ORAL at 20:34

## 2025-07-13 RX ADMIN — FENTANYL CITRATE 50 MCG: 0.05 INJECTION, SOLUTION INTRAMUSCULAR; INTRAVENOUS at 16:35

## 2025-07-13 RX ADMIN — OXYCODONE HYDROCHLORIDE AND ACETAMINOPHEN 1 TABLET: 7.5; 325 TABLET ORAL at 20:34

## 2025-07-13 RX ADMIN — CALCITRIOL CAPSULES 0.25 MCG 0.5 MCG: 0.25 CAPSULE ORAL at 20:34

## 2025-07-13 RX ADMIN — FENTANYL CITRATE 50 MCG: 0.05 INJECTION, SOLUTION INTRAMUSCULAR; INTRAVENOUS at 12:27

## 2025-07-13 RX ADMIN — HEPARIN SODIUM 5000 UNITS: 5000 INJECTION INTRAVENOUS; SUBCUTANEOUS at 20:33

## 2025-07-13 RX ADMIN — IOPAMIDOL 75 ML: 755 INJECTION, SOLUTION INTRAVENOUS at 16:13

## 2025-07-13 RX ADMIN — INSULIN GLARGINE 15 UNITS: 100 INJECTION, SOLUTION SUBCUTANEOUS at 20:33

## 2025-07-13 ASSESSMENT — PAIN DESCRIPTION - FREQUENCY
FREQUENCY: CONTINUOUS

## 2025-07-13 ASSESSMENT — PAIN DESCRIPTION - DESCRIPTORS
DESCRIPTORS: SHARP;SHOOTING
DESCRIPTORS: SHARP;SHOOTING
DESCRIPTORS: SPASM;SHOOTING
DESCRIPTORS: SPASM;SHOOTING
DESCRIPTORS: SPASM;SHARP
DESCRIPTORS: SHARP;SHOOTING

## 2025-07-13 ASSESSMENT — PAIN SCALES - GENERAL
PAINLEVEL_OUTOF10: 7
PAINLEVEL_OUTOF10: 7
PAINLEVEL_OUTOF10: 8
PAINLEVEL_OUTOF10: 5
PAINLEVEL_OUTOF10: 8
PAINLEVEL_OUTOF10: 7
PAINLEVEL_OUTOF10: 7
PAINLEVEL_OUTOF10: 8

## 2025-07-13 ASSESSMENT — PAIN DESCRIPTION - ORIENTATION
ORIENTATION: RIGHT;UPPER
ORIENTATION: RIGHT
ORIENTATION: UPPER;RIGHT
ORIENTATION: RIGHT;UPPER
ORIENTATION: RIGHT
ORIENTATION: RIGHT;UPPER

## 2025-07-13 ASSESSMENT — PAIN DESCRIPTION - LOCATION
LOCATION: ABDOMEN

## 2025-07-13 ASSESSMENT — PAIN - FUNCTIONAL ASSESSMENT
PAIN_FUNCTIONAL_ASSESSMENT: ACTIVITIES ARE NOT PREVENTED
PAIN_FUNCTIONAL_ASSESSMENT: 0-10

## 2025-07-13 ASSESSMENT — PAIN DESCRIPTION - PAIN TYPE
TYPE: ACUTE PAIN
TYPE: ACUTE PAIN

## 2025-07-13 ASSESSMENT — PAIN DESCRIPTION - ONSET: ONSET: ON-GOING

## 2025-07-13 ASSESSMENT — PAIN SCALES - WONG BAKER: WONGBAKER_NUMERICALRESPONSE: NO HURT

## 2025-07-13 NOTE — FLOWSHEET NOTE
Patient arrived from home c/o SOB , more than usual, wares 4LNC HS. Patient c/o new onset pain in RUQ that shoots to his chest. Hx of CHF

## 2025-07-13 NOTE — ED NOTES
ED TO INPATIENT SBAR HANDOFF    Patient Name: Yovanny Levine   :  1975  50 y.o.   Preferred Name     Family/Caregiver Present no   Restraints no   C-SSRS:    Sitter no   Sepsis Risk Score        Situation  Chief Complaint   Patient presents with    Shortness of Breath     Patient reports he has had shortness of breath, pain in chest and abdomen that started yesterday.     Brief Description of Patient's Condition: Patient c/o in from home c/o pain in the right upper abdomen. Denies nausea and vomiting. Patient stated it has been going on for a few days. Patient has ESRD and dialysis on Mon, Wed, Fri.   Mental Status: oriented, alert, coherent, logical, thought processes intact, and able to concentrate and follow conversation  Arrived from: home    Imaging:   CT ABDOMEN PELVIS W IV CONTRAST Additional Contrast? Radiologist Recommendation   Final Result      CTA PULMONARY W CONTRAST   Final Result      XR CHEST PORTABLE   Final Result        Abnormal labs:   Abnormal Labs Reviewed   BLOOD GAS, VENOUS - Abnormal; Notable for the following components:       Result Value    pH, Néstor 7.311 (*)     Methemoglobin 0.2 (*)     All other components within normal limits   CBC - Abnormal; Notable for the following components:    RBC 2.87 (*)     Hemoglobin 9.1 (*)     Hematocrit 27.6 (*)     MCH 31.7 (*)     RDW 15.2 (*)     All other components within normal limits   COMPREHENSIVE METABOLIC PANEL - Abnormal; Notable for the following components:    Chloride 95 (*)     Anion Gap 21 (*)     Glucose 166 (*)     BUN 66 (*)     Creatinine 9.2 (*)     Est, Glom Filt Rate 6 (*)     ALT 9 (*)     AST 13 (*)     All other components within normal limits   TROPONIN - Abnormal; Notable for the following components:    Troponin, High Sensitivity 146 (*)     All other components within normal limits   TROPONIN - Abnormal; Notable for the following components:    Troponin, High Sensitivity 145 (*)     All other components within normal

## 2025-07-13 NOTE — ED PROVIDER NOTES
into the skin nightly 4.5 mL 3    atorvastatin (LIPITOR) 40 MG tablet Take 1 tablet by mouth nightly 90 tablet 2    metoprolol tartrate (LOPRESSOR) 25 MG tablet Take 0.5 tablets by mouth 2 times daily 60 tablet 3    clopidogrel (PLAVIX) 75 MG tablet Take 1 tablet by mouth daily 90 tablet 3    traZODone (DESYREL) 150 MG tablet Take 1 tablet by mouth nightly      gentamicin (GARAMYCIN) 0.1 % ointment Apply topically daily Apply 1 gm topically with each dressing change, daily and prn. 30 g 1    budesonide-formoterol (SYMBICORT) 160-4.5 MCG/ACT AERO Inhale 2 puffs into the lungs in the morning and 2 puffs in the evening. 10.2 g 3    empagliflozin (JARDIANCE) 10 MG tablet Take 1 tablet by mouth daily 30 tablet 3    hydrALAZINE (APRESOLINE) 50 MG tablet Take 0.5 tablets by mouth in the morning and at bedtime 60 tablet 0    rOPINIRole (REQUIP) 0.25 MG tablet Take 1 tablet by mouth nightly 90 tablet 3    pantoprazole (PROTONIX) 40 MG tablet Take 1 tablet by mouth daily 30 tablet 0    isosorbide mononitrate (IMDUR) 30 MG extended release tablet Take 0.5 tablets by mouth daily 30 tablet 3    aspirin 81 MG chewable tablet Take 1 tablet by mouth daily 30 tablet 3    glucose 4 g chewable tablet Take 4 tablets by mouth as needed for Low blood sugar (Patient not taking: Reported on 6/24/2025) 60 tablet 3    OXYGEN Inhale 3 L into the lungs at bedtime      calcitRIOL (ROCALTROL) 0.5 MCG capsule Take 1 capsule by mouth 2 times daily 30 capsule 0    albuterol sulfate HFA (VENTOLIN HFA) 108 (90 Base) MCG/ACT inhaler Inhale 2 puffs into the lungs 4 times daily as needed for Wheezing 18 g 0    Lancets MISC 1 each by Does not apply route daily 100 each 3    glucose monitoring kit (FREESTYLE) monitoring kit 1 each by Does not apply route once for 1 dose. 1 kit 0     Allergies   Allergen Reactions    Adhesive Tape Rash    Doxycycline Nausea And Vomiting    Reglan [Metoclopramide] Anxiety    Vancomycin Itching       Nursing Notes

## 2025-07-13 NOTE — H&P
History and Physical Exam    Patient:  Yovanny Levine 50 y.o. male MRN: [id number]     Date of Service: 7/13/2025    Hospital Day: 1      Chief complaint: had concerns including Shortness of Breath (Patient reports he has had shortness of breath, pain in chest and abdomen that started yesterday.).      Assessment and Plan   Yovanny Levine, a 50 y.o. male, with a history of ESRD, Chronic resp failure on 3L/min nightly, Chronic HFrEF, CAD s/p PCI, PAD, Type II DM, H/O OM s/p amputations, HTN, HLD was admitted on 7/13/2025. They had concerns including Shortness of Breath (Patient reports he has had shortness of breath, pain in chest and abdomen that started yesterday.).    Assessment  #. SOB and Lower Chest pain/Upper abdominal pain 2/2 Likely Pulm edema 2/2 Fluid Overload   - States it started yesterday with lower chest/upper abdomen - goes across   - Along with SOB   - CTA pulm, CT abdomen and pelvis with contrast shows B/L Effusion with Rt. > Lt.   - Denies any fevers, chills or cough    #. Diarrhea   - Had diarrhea for a week   - Stopped a day prior to presentation   - Took imodium on 7/11/2025    #.  Elevated troponin  - 146 followed by 145  - Chronically elevated    #. H/O ESRD on HD   #. Chronic Resp Failure on 3L/min nightly   #. COPD  #. Chronic HFrEF with recovered EF  - On GDMT with metoprolol, jardiance, imdur, hydralazine     #. H/O CAD s/p PCI, PAD   - On DAPT     #. Type II DM   #. GERD  #. H/O OM s/p Amputations  #. H/O HTN, HLD   #. Chronic opioids       Plan  Admit to med surg with tele   Nephro consult for HD   IR consult for B/L Thoracentesis   Obtain GI PCR panel and C Diff if pt has further diarrhea   Resume home lantus and place on sliding scale   Resume home meds, meds reviewed and ordered      # Peptic ulcer prophylaxis: pantoprazole   # DVT Prophylaxis: Heparin  #CODE STATUS: I did discuss CODE STATUS with the patient, they opted for FULL CODE   Yovanny, son, to help with decision making
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
## 2025-07-14 ENCOUNTER — APPOINTMENT (OUTPATIENT)
Dept: GENERAL RADIOLOGY | Age: 50
DRG: 640 | End: 2025-07-14
Payer: MEDICARE

## 2025-07-14 ENCOUNTER — APPOINTMENT (OUTPATIENT)
Dept: INTERVENTIONAL RADIOLOGY/VASCULAR | Age: 50
DRG: 640 | End: 2025-07-14
Attending: INTERNAL MEDICINE
Payer: MEDICARE

## 2025-07-14 LAB
ANION GAP SERPL CALCULATED.3IONS-SCNC: 21 MMOL/L (ref 9–17)
BASOPHILS # BLD: 0.04 K/UL
BASOPHILS NFR BLD: 1 % (ref 0–1)
BUN SERPL-MCNC: 79 MG/DL (ref 7–20)
CALCIUM SERPL-MCNC: 7.9 MG/DL (ref 8.3–10.6)
CHLORIDE SERPL-SCNC: 95 MMOL/L (ref 99–110)
CO2 SERPL-SCNC: 21 MMOL/L (ref 21–32)
CREAT SERPL-MCNC: 10.3 MG/DL (ref 0.9–1.3)
EOSINOPHIL # BLD: 0.21 K/UL
EOSINOPHILS RELATIVE PERCENT: 4 % (ref 0–3)
ERYTHROCYTE [DISTWIDTH] IN BLOOD BY AUTOMATED COUNT: 15.6 % (ref 11.7–14.9)
GFR, ESTIMATED: 5 ML/MIN/1.73M2
GLUCOSE BLD-MCNC: 177 MG/DL (ref 74–99)
GLUCOSE BLD-MCNC: 94 MG/DL (ref 74–99)
GLUCOSE FLD-MCNC: 124 MG/DL
GLUCOSE FLD-MCNC: 144 MG/DL
GLUCOSE SERPL-MCNC: 123 MG/DL (ref 74–99)
HCT VFR BLD AUTO: 27.5 % (ref 42–52)
HGB BLD-MCNC: 8.8 G/DL (ref 13.5–18)
IMM GRANULOCYTES # BLD AUTO: 0.01 K/UL
IMM GRANULOCYTES NFR BLD: 0 %
INR PPP: 1.1
LDH FLD L TO P-CCNC: 111 U/L
LDH FLD L TO P-CCNC: 136 U/L
LDH SERPL-CCNC: 206 U/L (ref 100–190)
LYMPHOCYTES NFR BLD: 0.36 K/UL
LYMPHOCYTES RELATIVE PERCENT: 6 % (ref 24–44)
MAGNESIUM SERPL-MCNC: 2.4 MG/DL (ref 1.8–2.4)
MCH RBC QN AUTO: 31.3 PG (ref 27–31)
MCHC RBC AUTO-ENTMCNC: 32 G/DL (ref 32–36)
MCV RBC AUTO: 97.9 FL (ref 78–100)
MONOCYTES NFR BLD: 0.59 K/UL
MONOCYTES NFR BLD: 10 % (ref 0–5)
NEUTROPHILS NFR BLD: 80 % (ref 36–66)
NEUTS SEG NFR BLD: 4.87 K/UL
PH FLUID: 8 (ref 6.5–7.5)
PH FLUID: 8 (ref 6.5–7.5)
PHOSPHATE SERPL-MCNC: 11.1 MG/DL (ref 2.5–4.9)
PLATELET # BLD AUTO: 142 K/UL (ref 140–440)
PMV BLD AUTO: 10.3 FL (ref 7.5–11.1)
POTASSIUM SERPL-SCNC: 4.7 MMOL/L (ref 3.5–5.1)
PROT FLD-MCNC: 3 G/DL
PROT FLD-MCNC: 3.2 G/DL
PROTHROMBIN TIME: 15.2 SEC (ref 11.7–14.5)
RBC # BLD AUTO: 2.81 M/UL (ref 4.6–6.2)
SODIUM SERPL-SCNC: 137 MMOL/L (ref 136–145)
SPECIMEN TYPE: ABNORMAL
SPECIMEN TYPE: ABNORMAL
SPECIMEN TYPE: NORMAL
WBC OTHER # BLD: 6.1 K/UL (ref 4–10.5)

## 2025-07-14 PROCEDURE — 83735 ASSAY OF MAGNESIUM: CPT

## 2025-07-14 PROCEDURE — 6370000000 HC RX 637 (ALT 250 FOR IP): Performed by: INTERNAL MEDICINE

## 2025-07-14 PROCEDURE — 5A1D70Z PERFORMANCE OF URINARY FILTRATION, INTERMITTENT, LESS THAN 6 HOURS PER DAY: ICD-10-PCS | Performed by: INTERNAL MEDICINE

## 2025-07-14 PROCEDURE — 2500000003 HC RX 250 WO HCPCS: Performed by: INTERNAL MEDICINE

## 2025-07-14 PROCEDURE — 6360000002 HC RX W HCPCS: Performed by: INTERNAL MEDICINE

## 2025-07-14 PROCEDURE — 2700000000 HC OXYGEN THERAPY PER DAY

## 2025-07-14 PROCEDURE — 80048 BASIC METABOLIC PNL TOTAL CA: CPT

## 2025-07-14 PROCEDURE — 1200000000 HC SEMI PRIVATE

## 2025-07-14 PROCEDURE — 36415 COLL VENOUS BLD VENIPUNCTURE: CPT

## 2025-07-14 PROCEDURE — 87075 CULTR BACTERIA EXCEPT BLOOD: CPT

## 2025-07-14 PROCEDURE — 94761 N-INVAS EAR/PLS OXIMETRY MLT: CPT

## 2025-07-14 PROCEDURE — 71045 X-RAY EXAM CHEST 1 VIEW: CPT

## 2025-07-14 PROCEDURE — 85025 COMPLETE CBC W/AUTO DIFF WBC: CPT

## 2025-07-14 PROCEDURE — C1729 CATH, DRAINAGE: HCPCS

## 2025-07-14 PROCEDURE — 32555 ASPIRATE PLEURA W/ IMAGING: CPT

## 2025-07-14 PROCEDURE — 82962 GLUCOSE BLOOD TEST: CPT

## 2025-07-14 PROCEDURE — 90935 HEMODIALYSIS ONE EVALUATION: CPT

## 2025-07-14 PROCEDURE — 87070 CULTURE OTHR SPECIMN AEROBIC: CPT

## 2025-07-14 PROCEDURE — 83615 LACTATE (LD) (LDH) ENZYME: CPT

## 2025-07-14 PROCEDURE — 0W9B3ZZ DRAINAGE OF LEFT PLEURAL CAVITY, PERCUTANEOUS APPROACH: ICD-10-PCS | Performed by: RADIOLOGY

## 2025-07-14 PROCEDURE — 82945 GLUCOSE OTHER FLUID: CPT

## 2025-07-14 PROCEDURE — 84100 ASSAY OF PHOSPHORUS: CPT

## 2025-07-14 PROCEDURE — 87205 SMEAR GRAM STAIN: CPT

## 2025-07-14 PROCEDURE — 0W993ZZ DRAINAGE OF RIGHT PLEURAL CAVITY, PERCUTANEOUS APPROACH: ICD-10-PCS | Performed by: RADIOLOGY

## 2025-07-14 PROCEDURE — 84157 ASSAY OF PROTEIN OTHER: CPT

## 2025-07-14 PROCEDURE — 85610 PROTHROMBIN TIME: CPT

## 2025-07-14 PROCEDURE — 83986 ASSAY PH BODY FLUID NOS: CPT

## 2025-07-14 RX ADMIN — ASPIRIN 81 MG 81 MG: 81 TABLET ORAL at 13:13

## 2025-07-14 RX ADMIN — HYDROMORPHONE HYDROCHLORIDE 0.5 MG: 1 INJECTION, SOLUTION INTRAMUSCULAR; INTRAVENOUS; SUBCUTANEOUS at 06:42

## 2025-07-14 RX ADMIN — ISOSORBIDE MONONITRATE 15 MG: 30 TABLET, EXTENDED RELEASE ORAL at 13:12

## 2025-07-14 RX ADMIN — HYDROMORPHONE HYDROCHLORIDE 0.5 MG: 1 INJECTION, SOLUTION INTRAMUSCULAR; INTRAVENOUS; SUBCUTANEOUS at 13:22

## 2025-07-14 RX ADMIN — HYDROMORPHONE HYDROCHLORIDE 0.5 MG: 1 INJECTION, SOLUTION INTRAMUSCULAR; INTRAVENOUS; SUBCUTANEOUS at 00:11

## 2025-07-14 RX ADMIN — HEPARIN SODIUM 5000 UNITS: 5000 INJECTION INTRAVENOUS; SUBCUTANEOUS at 06:41

## 2025-07-14 RX ADMIN — TRAZODONE HYDROCHLORIDE 150 MG: 50 TABLET ORAL at 20:25

## 2025-07-14 RX ADMIN — INSULIN GLARGINE 15 UNITS: 100 INJECTION, SOLUTION SUBCUTANEOUS at 20:26

## 2025-07-14 RX ADMIN — HEPARIN SODIUM 5000 UNITS: 5000 INJECTION INTRAVENOUS; SUBCUTANEOUS at 17:43

## 2025-07-14 RX ADMIN — SODIUM CHLORIDE, PRESERVATIVE FREE 10 ML: 5 INJECTION INTRAVENOUS at 20:27

## 2025-07-14 RX ADMIN — CALCITRIOL CAPSULES 0.25 MCG 0.5 MCG: 0.25 CAPSULE ORAL at 13:13

## 2025-07-14 RX ADMIN — HYDROMORPHONE HYDROCHLORIDE 0.5 MG: 1 INJECTION, SOLUTION INTRAMUSCULAR; INTRAVENOUS; SUBCUTANEOUS at 20:25

## 2025-07-14 RX ADMIN — ATORVASTATIN CALCIUM 40 MG: 40 TABLET, FILM COATED ORAL at 20:26

## 2025-07-14 RX ADMIN — CALCITRIOL CAPSULES 0.25 MCG 0.5 MCG: 0.25 CAPSULE ORAL at 20:25

## 2025-07-14 RX ADMIN — EMPAGLIFLOZIN 10 MG: 10 TABLET, FILM COATED ORAL at 13:17

## 2025-07-14 RX ADMIN — ROPINIROLE HYDROCHLORIDE 0.25 MG: 0.25 TABLET, FILM COATED ORAL at 20:24

## 2025-07-14 RX ADMIN — OXYCODONE HYDROCHLORIDE AND ACETAMINOPHEN 1 TABLET: 7.5; 325 TABLET ORAL at 16:44

## 2025-07-14 RX ADMIN — EPOETIN ALFA-EPBX 10000 UNITS: 10000 INJECTION, SOLUTION INTRAVENOUS; SUBCUTANEOUS at 09:26

## 2025-07-14 RX ADMIN — SODIUM CHLORIDE, PRESERVATIVE FREE 10 ML: 5 INJECTION INTRAVENOUS at 13:20

## 2025-07-14 RX ADMIN — HYDRALAZINE HYDROCHLORIDE 25 MG: 25 TABLET ORAL at 20:26

## 2025-07-14 RX ADMIN — METOPROLOL TARTRATE 12.5 MG: 25 TABLET, FILM COATED ORAL at 20:24

## 2025-07-14 RX ADMIN — METOPROLOL TARTRATE 12.5 MG: 25 TABLET, FILM COATED ORAL at 13:16

## 2025-07-14 RX ADMIN — HYDRALAZINE HYDROCHLORIDE 25 MG: 25 TABLET ORAL at 13:12

## 2025-07-14 RX ADMIN — PANTOPRAZOLE SODIUM 40 MG: 40 TABLET, DELAYED RELEASE ORAL at 13:13

## 2025-07-14 RX ADMIN — HEPARIN SODIUM 5000 UNITS: 5000 INJECTION INTRAVENOUS; SUBCUTANEOUS at 20:25

## 2025-07-14 ASSESSMENT — PAIN SCALES - GENERAL
PAINLEVEL_OUTOF10: 3
PAINLEVEL_OUTOF10: 3
PAINLEVEL_OUTOF10: 7
PAINLEVEL_OUTOF10: 7
PAINLEVEL_OUTOF10: 3
PAINLEVEL_OUTOF10: 7
PAINLEVEL_OUTOF10: 7
PAINLEVEL_OUTOF10: 2
PAINLEVEL_OUTOF10: 8
PAINLEVEL_OUTOF10: 8
PAINLEVEL_OUTOF10: 7

## 2025-07-14 ASSESSMENT — PAIN DESCRIPTION - LOCATION
LOCATION: ABDOMEN
LOCATION: ABDOMEN
LOCATION: ABDOMEN;RIB CAGE
LOCATION: ABDOMEN;RIB CAGE
LOCATION: ABDOMEN
LOCATION: ABDOMEN;RIB CAGE

## 2025-07-14 ASSESSMENT — PAIN SCALES - WONG BAKER
WONGBAKER_NUMERICALRESPONSE: HURTS WHOLE LOT
WONGBAKER_NUMERICALRESPONSE: NO HURT
WONGBAKER_NUMERICALRESPONSE: HURTS A LITTLE BIT
WONGBAKER_NUMERICALRESPONSE: NO HURT
WONGBAKER_NUMERICALRESPONSE: NO HURT

## 2025-07-14 ASSESSMENT — PAIN DESCRIPTION - ORIENTATION
ORIENTATION: MID
ORIENTATION: RIGHT
ORIENTATION: OTHER (COMMENT)
ORIENTATION: RIGHT;LEFT;MID

## 2025-07-14 ASSESSMENT — PAIN - FUNCTIONAL ASSESSMENT: PAIN_FUNCTIONAL_ASSESSMENT: ACTIVITIES ARE NOT PREVENTED

## 2025-07-14 ASSESSMENT — PAIN DESCRIPTION - DESCRIPTORS
DESCRIPTORS: ACHING
DESCRIPTORS: CRAMPING;SHARP
DESCRIPTORS: ACHING
DESCRIPTORS: CRAMPING;SHARP
DESCRIPTORS: CRAMPING
DESCRIPTORS: ACHING

## 2025-07-14 NOTE — PROGRESS NOTES
V2.0    [facility] [place]      Name:  Yovanny Levine /Age/Sex: 1975  (50 y.o. male)   MRN & CSN:  [id number] & [id number] Encounter Date/Time: 2025 3:21 PM EDT   Location:  [facility]-A PCP: José Luis Izquierdo MD     Attending:Mary Long MD       Hospital Day: 2    Assessment and Recommendations   Yovanny Levine, a 50 y.o. male, with a history of ESRD, Chronic resp failure on 3L/min nightly, Chronic HFrEF, CAD s/p PCI, PAD, Type II DM, H/O OM s/p amputations, HTN, HLD was admitted on 2025. They had concerns including Shortness of Breath       Plan:     #. SOB and Lower Chest pain/Upper abdominal pain 2/2 Likely Pulm edema 2/2 Fluid Overload   - States it started yesterday with lower chest/upper abdomen - goes across   - Along with SOB   - CTA pulm, CT abdomen and pelvis with contrast shows B/L Effusion with Rt. > Lt.   - Denies any fevers, chills or cough  - Plan for dialysis today  -IR consult for bilateral thoracentesis     #. Diarrhea   - Had diarrhea for a week   - Stopped a day prior to presentation   - Took imodium on 2025  -GI PCR panel C. difficile if patient has further diarrhea     #.  Elevated troponin  - 146 followed by 145  - Chronically elevated     #. H/O ESRD on HD   #. Chronic Resp Failure on 3L/min nightly   #. COPD  #. Chronic HFrEF with recovered EF  - On GDMT with metoprolol, jardiance, imdur, hydralazine      #. H/O CAD s/p PCI, PAD   - On DAPT      #. Type II DM   #. GERD  #. H/O OM s/p Amputations  #. H/O HTN, HLD   #. Chronic opioids       Diet ADULT DIET; Regular; 4 carb choices (60 gm/meal); Low Fat/Low Chol/High Fiber/ERLIN   DVT Prophylaxis [] Lovenox, []  Heparin, [] SCDs, [] Ambulation,  [] Eliquis, [] Xarelto  [] Coumadin   Code Status Full Code   Disposition From: Home  Expected Disposition: Home  Estimated Date of Discharge: TBD  Patient requires continued admission due to fluid overload pulmonary edema need for thoracentesis dialysis   Surrogate Decision
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
## 2025-07-14 NOTE — BRIEF OP NOTE
Department of Interventional Radiology Post-Procedure Note      Date: 7/14/2025     Procedure Performed:  b/l us guided thoracentesis    Pre-procedure Diagnosis:  b/l pleural effusion    Post-procedure Diagnosis:  Same    Estimated blood loss:  Minimal    Preliminary Findings:  b/l mildly serosanguinous pleural effusion    Complications:  none    Full Report to Follow.    Electronically signed by Rahul Pollock DO on 7/14/2025 at 3:48 PM

## 2025-07-14 NOTE — PROGRESS NOTES
Pt completed 4 hours dialysis today removing 3.3 L fluid.  Tolerated well, no distress noted.  Left AVF used for access ran good, pressure dressing to needle sites.  Retacrit given per order.  Pt denies needs.  Report to ROBIN Ugalde.        Patient Name: Yovanny Levine  Patient : 1975  MRN: [id number]     Acct: [id number]  Date of Admission: 2025  Room/Bed: 3030/3030-A  Code Status:  Full Code  Allergies:   Allergies   Allergen Reactions    Adhesive Tape Rash    Doxycycline Nausea And Vomiting    Reglan [Metoclopramide] Anxiety    Vancomycin Itching     Diagnosis:    Patient Active Problem List   Diagnosis    Hiatal hernia    Diabetes mellitus type 2, improved controlled    Diabetic neuropathy (HCC)    Panic attack    Anxiety associated with depression    Neck pain    DANIELA (obstructive sleep apnea)    Urinary frequency    Bilateral carpal tunnel syndrome- left worse than right    Hyperlipidemia with target LDL less than 100    Essential hypertension    Bronchitis    Osteomyelitis (HCC)    Abscess    Periumbilical hernia    Sepsis (HCC)    Cellulitis of left foot    Constipation    Intractable nausea and vomiting    Nausea and vomiting    Acute renal failure (ARF)    Cellulitis    Cellulitis of left arm    Cellulitis, scrotum    Acute epididymitis    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HCC)    Recurrent major depressive disorder, in full remission    Generalized anxiety disorder    Necrotizing fasciitis (HCC)    Syncope    Right groin wound    Nephrotic syndrome    Generalized edema    Stage 3b chronic kidney disease (HCC)    Diabetic nephropathy associated with type 2 diabetes mellitus (HCC)    Hypoalbuminemia    Chronic kidney disease, stage IV (severe) (HCC)    FH: CAD (coronary artery disease)    ASCVD (arteriosclerotic cardiovascular disease)    Other proteinuria    Chest pain    CARMEN (acute kidney injury)    Anemia    Chest tightness    NSTEMI (non-ST elevated myocardial infarction) (HCC)    Diabetic foot ulcer with
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
## 2025-07-14 NOTE — PROGRESS NOTES
TRANSFER - OUT REPORT:    Verbal report given to Candance, RN on Yovanny Levine being transferred to [facility] for routine post-op       Report consisted of patient's Situation, Background, Assessment and   Recommendations(SBAR).     Information from the following report(s) Nurse Handoff Report was reviewed with the receiving nurse.    Opportunity for questions and clarification was provided.      Patient transported with:   O2 @ 4 liters  Monitor

## 2025-07-14 NOTE — PROGRESS NOTES
Nephrology  Dialysis Note        [address] [id number]  Phone: [phone]  Office Hours: 8:30AM - 4:30PM  Monday - Friday          PROCEDURE:  Patient seen during hemodialysis      PHYSICIAN:  ANTHONY      INDICATION:  End-stage renal disease      RX:  See dialysis flowsheet for specifics on access, blood flow rate, dialysate baths, duration of dialysis, anticoagulation and other technical information.      COMMENTS:  SEEN ON HD SET TO LOSE 3-3.5KG IF BP ALLOWS    THANK YOU    Electronically signed by Melody James DO on 7/14/2025 at 8:36 AM

## 2025-07-14 NOTE — PLAN OF CARE
Problem: Chronic Conditions and Co-morbidities  Goal: Patient's chronic conditions and co-morbidity symptoms are monitored and maintained or improved  7/14/2025 1256 by Jolene Cardona RN  Outcome: Progressing  7/14/2025 1256 by Jolene Cardona RN  Outcome: Progressing     Problem: Discharge Planning  Goal: Discharge to home or other facility with appropriate resources  7/14/2025 1256 by Jolene Cardona RN  Outcome: Progressing  7/14/2025 1256 by Jolene Cardona RN  Outcome: Progressing     Problem: Pain  Goal: Verbalizes/displays adequate comfort level or baseline comfort level  7/14/2025 1256 by Jolene Cardona RN  Outcome: Progressing  7/14/2025 1256 by Jolene Cardona RN  Outcome: Progressing     Problem: Safety - Adult  Goal: Free from fall injury  7/14/2025 1256 by Jolene Cardona RN  Outcome: Progressing  7/14/2025 1256 by Jolene Cardona RN  Outcome: Progressing

## 2025-07-14 NOTE — CONSULTS
Consult Interventional Radiology    Date:2025  Name:Yovanny Levine   :1975   MR#:[id number]    SEX:male     Planned procedure:  b/l thoracentesis  Indication: b/l pleural effusion    H&P Status: H&P was reviewed, the patient was examined and no change has occurred in patient's condition since H&P was completed.    Past Medical History:  Past Medical History:   Diagnosis Date    Abscess     scrotal    Acute renal failure (ARF) 2019    Anxiety associated with depression     Back pain 2012    CAD (coronary artery disease) 02/10/2023    Chest pain 2013    Diabetic neuropathy (HCC) 2011    Diabetic ulcer of left midfoot associated with type 2 diabetes mellitus, with fat layer exposed ([facility]) 2017    Diabetic ulcer of right midfoot associated with type 2 diabetes mellitus, with fat layer exposed ([facility]) 2024    Diverticulosis     C scope + Dr. Medina    DM (diabetes mellitus), type 2 ([facility])     DR. Turner podiatry    ESRD (end stage renal disease) on dialysis ([facility])     every MWF    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 ([facility])     H/O percutaneous left heart catheterization 2021    PCI procedure:DE Stent, LAD: DE Stent Plcmt Initl Vsl    Heart failure ([facility]) 2025    Hyperlipidemia LDL goal < 100 07/15/2013    Hypertension     Internal hemorrhoid     C scope + Dr. Medina    Nausea and vomiting 2019    Necrotizing fasciitis ([facility])     Nose fracture 1988    Panic attacks     Pericarditis     Hospitalized with s/p heart cath normal    Peripheral autonomic neuropathy due to diabetes mellitus ([facility])     Axonal EMG- NCS, 2011    Sebaceous cyst 2011    URI (upper respiratory infection) 2012    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 type 2 diabetes mellitus, with muscle involvement without evidence of necrosis ([facility]) 2021    WD-Wound, surgical, infected, initial encounter 2017    Wrist fracture 1986        Past Surgical

## 2025-07-14 NOTE — CARE COORDINATION
07/14/25 1514   Service Assessment   Patient Orientation Alert and Oriented   Cognition Alert   History Provided By Patient   Primary Caregiver Self   Support Systems Children   Patient's Healthcare Decision Maker is: Legal Next of Kin   PCP Verified by CM Yes   Prior Functional Level Independent in ADLs/IADLs   Current Functional Level Independent in ADLs/IADLs   Can patient return to prior living arrangement Yes   Ability to make needs known: Good   Family able to assist with home care needs: No   Would you like for me to discuss the discharge plan with any other family members/significant others, and if so, who? No   Financial Resources Medicare;Medicaid   Community Resources None     CM in to see pt to initiate discharge planning.  Pt is from home.  Pt denies any needs a this time.  CM following

## 2025-07-15 VITALS
OXYGEN SATURATION: 94 % | HEIGHT: 69 IN | RESPIRATION RATE: 16 BRPM | DIASTOLIC BLOOD PRESSURE: 75 MMHG | BODY MASS INDEX: 37.77 KG/M2 | HEART RATE: 66 BPM | TEMPERATURE: 97.9 F | WEIGHT: 255 LBS | SYSTOLIC BLOOD PRESSURE: 150 MMHG

## 2025-07-15 LAB
ANION GAP SERPL CALCULATED.3IONS-SCNC: 16 MMOL/L (ref 9–17)
BASOPHILS # BLD: 0.02 K/UL
BASOPHILS NFR BLD: 0 % (ref 0–1)
BUN SERPL-MCNC: 52 MG/DL (ref 7–20)
CALCIUM SERPL-MCNC: 8 MG/DL (ref 8.3–10.6)
CHLORIDE SERPL-SCNC: 97 MMOL/L (ref 99–110)
CO2 SERPL-SCNC: 24 MMOL/L (ref 21–32)
CREAT SERPL-MCNC: 7.3 MG/DL (ref 0.9–1.3)
EOSINOPHIL # BLD: 0.17 K/UL
EOSINOPHILS RELATIVE PERCENT: 3 % (ref 0–3)
ERYTHROCYTE [DISTWIDTH] IN BLOOD BY AUTOMATED COUNT: 15.4 % (ref 11.7–14.9)
GFR, ESTIMATED: 8 ML/MIN/1.73M2
GLUCOSE BLD-MCNC: 139 MG/DL (ref 74–99)
GLUCOSE BLD-MCNC: 142 MG/DL (ref 74–99)
GLUCOSE SERPL-MCNC: 113 MG/DL (ref 74–99)
HCT VFR BLD AUTO: 28.4 % (ref 42–52)
HGB BLD-MCNC: 8.9 G/DL (ref 13.5–18)
IMM GRANULOCYTES # BLD AUTO: 0.02 K/UL
IMM GRANULOCYTES NFR BLD: 0 %
LYMPHOCYTES NFR BLD: 0.4 K/UL
LYMPHOCYTES RELATIVE PERCENT: 7 % (ref 24–44)
MAGNESIUM SERPL-MCNC: 2.3 MG/DL (ref 1.8–2.4)
MCH RBC QN AUTO: 30.6 PG (ref 27–31)
MCHC RBC AUTO-ENTMCNC: 31.3 G/DL (ref 32–36)
MCV RBC AUTO: 97.6 FL (ref 78–100)
MONOCYTES NFR BLD: 0.59 K/UL
MONOCYTES NFR BLD: 11 % (ref 0–5)
NEUTROPHILS NFR BLD: 78 % (ref 36–66)
NEUTS SEG NFR BLD: 4.37 K/UL
PHOSPHATE SERPL-MCNC: 8.4 MG/DL (ref 2.5–4.9)
PLATELET # BLD AUTO: 140 K/UL (ref 140–440)
PMV BLD AUTO: 10.3 FL (ref 7.5–11.1)
POTASSIUM SERPL-SCNC: 4.7 MMOL/L (ref 3.5–5.1)
RBC # BLD AUTO: 2.91 M/UL (ref 4.6–6.2)
SODIUM SERPL-SCNC: 137 MMOL/L (ref 136–145)
WBC OTHER # BLD: 5.6 K/UL (ref 4–10.5)

## 2025-07-15 PROCEDURE — 6370000000 HC RX 637 (ALT 250 FOR IP): Performed by: INTERNAL MEDICINE

## 2025-07-15 PROCEDURE — 94761 N-INVAS EAR/PLS OXIMETRY MLT: CPT

## 2025-07-15 PROCEDURE — 80048 BASIC METABOLIC PNL TOTAL CA: CPT

## 2025-07-15 PROCEDURE — 83735 ASSAY OF MAGNESIUM: CPT

## 2025-07-15 PROCEDURE — 85025 COMPLETE CBC W/AUTO DIFF WBC: CPT

## 2025-07-15 PROCEDURE — 6360000002 HC RX W HCPCS: Performed by: INTERNAL MEDICINE

## 2025-07-15 PROCEDURE — 84100 ASSAY OF PHOSPHORUS: CPT

## 2025-07-15 PROCEDURE — 2500000003 HC RX 250 WO HCPCS: Performed by: INTERNAL MEDICINE

## 2025-07-15 PROCEDURE — 2700000000 HC OXYGEN THERAPY PER DAY

## 2025-07-15 PROCEDURE — 36415 COLL VENOUS BLD VENIPUNCTURE: CPT

## 2025-07-15 PROCEDURE — 94640 AIRWAY INHALATION TREATMENT: CPT

## 2025-07-15 PROCEDURE — 82962 GLUCOSE BLOOD TEST: CPT

## 2025-07-15 RX ADMIN — SODIUM CHLORIDE, PRESERVATIVE FREE 10 ML: 5 INJECTION INTRAVENOUS at 08:25

## 2025-07-15 RX ADMIN — HYDROMORPHONE HYDROCHLORIDE 0.5 MG: 1 INJECTION, SOLUTION INTRAMUSCULAR; INTRAVENOUS; SUBCUTANEOUS at 01:28

## 2025-07-15 RX ADMIN — HYDROMORPHONE HYDROCHLORIDE 0.5 MG: 1 INJECTION, SOLUTION INTRAMUSCULAR; INTRAVENOUS; SUBCUTANEOUS at 11:48

## 2025-07-15 RX ADMIN — ACETAMINOPHEN 650 MG: 325 TABLET ORAL at 01:28

## 2025-07-15 RX ADMIN — TIOTROPIUM BROMIDE INHALATION SPRAY 5 MCG: 3.12 SPRAY, METERED RESPIRATORY (INHALATION) at 11:50

## 2025-07-15 RX ADMIN — HEPARIN SODIUM 5000 UNITS: 5000 INJECTION INTRAVENOUS; SUBCUTANEOUS at 05:52

## 2025-07-15 RX ADMIN — ASPIRIN 81 MG 81 MG: 81 TABLET ORAL at 08:26

## 2025-07-15 RX ADMIN — CLOPIDOGREL BISULFATE 75 MG: 75 TABLET, FILM COATED ORAL at 08:25

## 2025-07-15 RX ADMIN — EMPAGLIFLOZIN 10 MG: 10 TABLET, FILM COATED ORAL at 08:25

## 2025-07-15 RX ADMIN — HYDRALAZINE HYDROCHLORIDE 25 MG: 25 TABLET ORAL at 08:26

## 2025-07-15 RX ADMIN — METOPROLOL TARTRATE 12.5 MG: 25 TABLET, FILM COATED ORAL at 08:26

## 2025-07-15 RX ADMIN — CALCITRIOL CAPSULES 0.25 MCG 0.5 MCG: 0.25 CAPSULE ORAL at 08:25

## 2025-07-15 RX ADMIN — BUDESONIDE AND FORMOTEROL FUMARATE DIHYDRATE 2 PUFF: 160; 4.5 AEROSOL RESPIRATORY (INHALATION) at 11:51

## 2025-07-15 RX ADMIN — HEPARIN SODIUM 5000 UNITS: 5000 INJECTION INTRAVENOUS; SUBCUTANEOUS at 15:03

## 2025-07-15 RX ADMIN — ISOSORBIDE MONONITRATE 15 MG: 30 TABLET, EXTENDED RELEASE ORAL at 08:26

## 2025-07-15 RX ADMIN — OXYCODONE HYDROCHLORIDE AND ACETAMINOPHEN 1 TABLET: 7.5; 325 TABLET ORAL at 08:25

## 2025-07-15 RX ADMIN — PANTOPRAZOLE SODIUM 40 MG: 40 TABLET, DELAYED RELEASE ORAL at 08:25

## 2025-07-15 ASSESSMENT — PAIN SCALES - GENERAL
PAINLEVEL_OUTOF10: 10
PAINLEVEL_OUTOF10: 7
PAINLEVEL_OUTOF10: 7
PAINLEVEL_OUTOF10: 6

## 2025-07-15 ASSESSMENT — PAIN DESCRIPTION - LOCATION
LOCATION: ABDOMEN;RIB CAGE
LOCATION: ABDOMEN

## 2025-07-15 ASSESSMENT — PAIN SCALES - WONG BAKER: WONGBAKER_NUMERICALRESPONSE: HURTS EVEN MORE

## 2025-07-15 ASSESSMENT — PAIN DESCRIPTION - DESCRIPTORS
DESCRIPTORS: SHARP
DESCRIPTORS: SHARP;STABBING

## 2025-07-15 ASSESSMENT — PAIN DESCRIPTION - ORIENTATION
ORIENTATION: RIGHT;LEFT
ORIENTATION: RIGHT;LEFT

## 2025-07-15 NOTE — PROGRESS NOTES
Spiritual Health History and Assessment/Progress Note  [facility]    Spiritual/Emotional Needs,  ,  ,      Name: Yovanny Levine MRN: [id number]    Age: 50 y.o.     Sex: male   Language: English   Latter day: Samaritan   Pleural effusion     Date: 7/15/2025            Total Time Calculated: 14 min              Spiritual Assessment continued in [facility] 3E        Referral/Consult From: Patient   Encounter Overview/Reason: Spiritual/Emotional Needs  Service Provided For: Patient    Nicolasa, Belief, Meaning:   Patient identifies as spiritual, is connected with a nicolasa tradition or spiritual practice, and has beliefs or practices that help with coping during difficult times  Family/Friends No family/friends present      Importance and Influence:  Patient has spiritual/personal beliefs that influence decisions regarding their health  Family/Friends No family/friends present    Community:  Patient feels well-supported. Support system includes: Children and Extended family  Family/Friends No family/friends present    Assessment and Plan of Care:     Patient Interventions include: Facilitated expression of thoughts and feelings, Explored spiritual coping/struggle/distress, Engaged in theological reflection, and Affirmed coping skills/support systems  Family/Friends Interventions include: No family/friends present    Patient Plan of Care: Spiritual Care available upon further referral  Family/Friends Plan of Care: No family/friends present    Electronically signed by Chaplain Melvin on 7/15/2025 at 3:57 PM

## 2025-07-15 NOTE — PROGRESS NOTES
07/15/25 1422   Encounter Summary   Encounter Overview/Reason Spiritual/Emotional Needs   Encounter Code  Assessment by  services   Service Provided For Patient   Referral/Consult From [facility]   Support System Family members   Last Encounter  07/15/25  (Pt expressed feeling tired, going home soon, wanted me to visit him before he left, he wanted prayer for health and for his family, seemed tired/discouraged but glad to go home. Expressed feeling peace with God.)   Complexity of Encounter Low   Begin Time 1400   End Time  1423   Total Time Calculated 23 min   Spiritual/Emotional needs   Type Spiritual Support   Assessment/Intervention/Outcome   Assessment Calm;Concerns with suffering;Hopeful;Stress overload   Intervention Active listening;Discussed illness injury and it’s impact;Discussed relationship with God;Prayer (assurance of)/[place];Sustaining Presence/Ministry of presence   Outcome Comfort;Coping;Expressed feelings, needs, and concerns;Expressed Gratitude   Plan and Referrals   Plan/Referrals Continue Support (comment)

## 2025-07-15 NOTE — PLAN OF CARE
Problem: Chronic Conditions and Co-morbidities  Goal: Patient's chronic conditions and co-morbidity symptoms are monitored and maintained or improved  7/15/2025 1308 by Víctor Walsh RN  Outcome: Progressing     Problem: Discharge Planning  Goal: Discharge to home or other facility with appropriate resources  7/15/2025 1308 by Víctor Walsh RN  Outcome: Progressing     Problem: Pain  Goal: Verbalizes/displays adequate comfort level or baseline comfort level  7/15/2025 1308 by Víctor Walsh RN  Outcome: Progressing  Flowsheets (Taken 7/14/2025 1638 by Jolene Cardona RN)  Verbalizes/displays adequate comfort level or baseline comfort level:   Encourage patient to monitor pain and request assistance   Assess pain using appropriate pain scale   Administer analgesics based on type and severity of pain and evaluate response   Implement non-pharmacological measures as appropriate and evaluate response   Notify Licensed Independent Practitioner if interventions unsuccessful or patient reports new pain     Problem: Safety - Adult  Goal: Free from fall injury  7/15/2025 1308 by Víctor Walsh RN  Outcome: Progressing

## 2025-07-15 NOTE — PROGRESS NOTES
Spiritual Health Progress Note  [facility]      Room # 3030/3030-A    Name: Yovanny Levine           Age: 50 y.o.    Gender: male          MRN: [id number]  Presybeterian: Jewish       Preferred Language: English      Date: 07/15/25  Visit Time: Begin Time: 1400 End Time : 1423  Complexity of Encounter: Low      Visit Summary: Pt expressed feeling tired, going home soon, wanted me to visit him before he left, he wanted prayer for health and for his family, seemed tired/discouraged but glad to go home. Expressed feeling peace with God. high risk readmit     Referral/Consult From: Rounding  Encounter Overview/Reason: Spiritual/Emotional Needs  Encounter Code: Encounter Code:  Assessment by  services   Crisis (if applicable):    Service Provided For: Patient     Patient was available.    Nicolasa, Belief, Meaning:   Patient identifies as spiritual  Family/Friends No family/friends present  Rituals (if applicable)      Importance and Influence:  Patient has spiritual/personal beliefs that influence decisions regarding their health  Family/Friends No family/friends present    Community:  Patient   indicated that they feel well-supported  Family/Friends   No family/ friends present.    Assessment and Plan of Care:   Emotions Expressed by Patient:   Assessment: Calm, Concerns with suffering, Hopeful, Stress overload    Interventions by :   Intervention: Active listening, Discussed illness injury and it’s impact, Discussed relationship with God, Prayer (assurance of)/[place], Sustaining Presence/Ministry of presence     Result/ Response by Patient:   Outcome: Comfort, Coping, Expressed feelings, needs, and concerns, Expressed Gratitude    Patient Plan of Care:   Plan and Referrals  Plan/Referrals: Continue Support (comment)     Emotions Expressed by Spouse/Family/Friends:   peaceful  weak  thankful  weary     Interventions with Spouse/ Family/Friends include:   active listening  explored

## 2025-07-15 NOTE — PLAN OF CARE
Problem: Pain  Goal: Verbalizes/displays adequate comfort level or baseline comfort level  Outcome: Progressing  Flowsheets (Taken 7/14/2025 1638 by Jolene Cardona RN)  Verbalizes/displays adequate comfort level or baseline comfort level:   Encourage patient to monitor pain and request assistance   Assess pain using appropriate pain scale   Administer analgesics based on type and severity of pain and evaluate response   Implement non-pharmacological measures as appropriate and evaluate response   Notify Licensed Independent Practitioner if interventions unsuccessful or patient reports new pain     Problem: Chronic Conditions and Co-morbidities  Goal: Patient's chronic conditions and co-morbidity symptoms are monitored and maintained or improved  Outcome: Progressing     Problem: Safety - Adult  Goal: Free from fall injury  Outcome: Progressing

## 2025-07-15 NOTE — DISCHARGE SUMMARY
#. H/O CAD s/p PCI, PAD   - On DAPT      #. Type II DM   #. GERD  #. H/O OM s/p Amputations  #. H/O HTN, HLD   #. Chronic opioids       Seen and examined on the day of discharge.  Doing well.  Oxygen requirements at baseline.  No diarrhea since hospital presentation.       The patient expressed appropriate understanding of and agreement with the discharge recommendations, medications, and plan.     Consults this admission:  IP CONSULT TO INTERVENTIONAL RADIOLOGY  IP CONSULT TO NEPHROLOGY  IP CONSULT TO SPIRITUAL SERVICES      Discharge Instruction:   Handoff to PCP:     Follow up appointments: PCP, Nephrology   Primary care physician:     Diet:  renal diet   Activity: activity as tolerated  Disposition: Discharged to:   [x]Home, [][facility], []SNF, []Acute Rehab, []Hospice   Condition on discharge: Stable    Discharge Medications:        Medication List        CONTINUE taking these medications      albuterol sulfate  (90 Base) MCG/ACT inhaler  Commonly known as: Ventolin HFA  Inhale 2 puffs into the lungs 4 times daily as needed for Wheezing     aspirin 81 MG chewable tablet  Take 1 tablet by mouth daily     atorvastatin 40 MG tablet  Commonly known as: LIPITOR  Take 1 tablet by mouth nightly     budesonide-formoterol 160-4.5 MCG/ACT Aero  Commonly known as: SYMBICORT  Inhale 2 puffs into the lungs in the morning and 2 puffs in the evening.     calcitRIOL 0.5 MCG capsule  Commonly known as: ROCALTROL  Take 1 capsule by mouth 2 times daily     clopidogrel 75 MG tablet  Commonly known as: PLAVIX  Take 1 tablet by mouth daily     empagliflozin 10 MG tablet  Commonly known as: JARDIANCE  Take 1 tablet by mouth daily     gentamicin 0.1 % ointment  Commonly known as: GARAMYCIN  Apply topically daily Apply 1 gm topically with each dressing change, daily and prn.     glucose monitoring kit  1 each by Does not apply route once for 1 dose.     hydrALAZINE 50 MG tablet  Commonly known as: APRESOLINE  Take 0.5 tablets by

## 2025-07-17 LAB
MICROORGANISM SPEC CULT: NORMAL
MICROORGANISM SPEC CULT: NORMAL
MICROORGANISM/AGENT SPEC: NORMAL
SERVICE CMNT-IMP: NORMAL
SPECIMEN DESCRIPTION: NORMAL
SPECIMEN DESCRIPTION: NORMAL

## 2025-07-24 ENCOUNTER — HOSPITAL ENCOUNTER (OUTPATIENT)
Dept: WOUND CARE | Age: 50
Discharge: HOME | End: 2025-07-24
Attending: STUDENT IN AN ORGANIZED HEALTH CARE EDUCATION/TRAINING PROGRAM
Payer: MEDICARE

## 2025-07-24 VITALS
HEART RATE: 71 BPM | DIASTOLIC BLOOD PRESSURE: 89 MMHG | TEMPERATURE: 98.7 F | RESPIRATION RATE: 16 BRPM | SYSTOLIC BLOOD PRESSURE: 178 MMHG

## 2025-07-24 DIAGNOSIS — E11.621 DIABETIC ULCER OF RIGHT MIDFOOT ASSOCIATED WITH TYPE 2 DIABETES MELLITUS, WITH FAT LAYER EXPOSED (HCC): ICD-10-CM

## 2025-07-24 DIAGNOSIS — S01.01XD LACERATION OF SCALP WITHOUT FOREIGN BODY, SUBSEQUENT ENCOUNTER: ICD-10-CM

## 2025-07-24 DIAGNOSIS — T21.22XD BURN OF SECOND DEGREE OF ABDOMINAL WALL, SUBSEQUENT ENCOUNTER: Primary | ICD-10-CM

## 2025-07-24 DIAGNOSIS — L97.412 DIABETIC ULCER OF RIGHT MIDFOOT ASSOCIATED WITH TYPE 2 DIABETES MELLITUS, WITH FAT LAYER EXPOSED (HCC): ICD-10-CM

## 2025-07-24 PROCEDURE — 16020 DRESS/DEBRID P-THICK BURN S: CPT

## 2025-07-24 PROCEDURE — 11042 DBRDMT SUBQ TIS 1ST 20SQCM/<: CPT

## 2025-07-24 RX ORDER — GINSENG 100 MG
CAPSULE ORAL ONCE
OUTPATIENT
Start: 2025-07-24 | End: 2025-07-24

## 2025-07-24 RX ORDER — NEOMYCIN/BACITRACIN/POLYMYXINB 3.5-400-5K
OINTMENT (GRAM) TOPICAL ONCE
OUTPATIENT
Start: 2025-07-24 | End: 2025-07-24

## 2025-07-24 RX ORDER — LIDOCAINE 50 MG/G
OINTMENT TOPICAL ONCE
OUTPATIENT
Start: 2025-07-24 | End: 2025-07-24

## 2025-07-24 RX ORDER — BETAMETHASONE DIPROPIONATE 0.5 MG/G
CREAM TOPICAL ONCE
OUTPATIENT
Start: 2025-07-24 | End: 2025-07-24

## 2025-07-24 RX ORDER — LIDOCAINE HYDROCHLORIDE 20 MG/ML
JELLY TOPICAL ONCE
OUTPATIENT
Start: 2025-07-24 | End: 2025-07-24

## 2025-07-24 RX ORDER — SILVER SULFADIAZINE 10 MG/G
CREAM TOPICAL ONCE
OUTPATIENT
Start: 2025-07-24 | End: 2025-07-24

## 2025-07-24 RX ORDER — GENTAMICIN SULFATE 1 MG/G
OINTMENT TOPICAL ONCE
OUTPATIENT
Start: 2025-07-24 | End: 2025-07-24

## 2025-07-24 RX ORDER — CLOBETASOL PROPIONATE 0.5 MG/G
OINTMENT TOPICAL ONCE
OUTPATIENT
Start: 2025-07-24 | End: 2025-07-24

## 2025-07-24 RX ORDER — BACITRACIN ZINC AND POLYMYXIN B SULFATE 500; 1000 [USP'U]/G; [USP'U]/G
OINTMENT TOPICAL ONCE
OUTPATIENT
Start: 2025-07-24 | End: 2025-07-24

## 2025-07-24 RX ORDER — LIDOCAINE 40 MG/G
CREAM TOPICAL ONCE
OUTPATIENT
Start: 2025-07-24 | End: 2025-07-24

## 2025-07-24 RX ORDER — LIDOCAINE HYDROCHLORIDE 40 MG/ML
SOLUTION TOPICAL ONCE
OUTPATIENT
Start: 2025-07-24 | End: 2025-07-24

## 2025-07-24 RX ORDER — MUPIROCIN 2 %
OINTMENT (GRAM) TOPICAL ONCE
OUTPATIENT
Start: 2025-07-24 | End: 2025-07-24

## 2025-07-24 RX ORDER — SODIUM CHLOR/HYPOCHLOROUS ACID 0.033 %
SOLUTION, IRRIGATION IRRIGATION ONCE
OUTPATIENT
Start: 2025-07-24 | End: 2025-07-24

## 2025-07-24 RX ORDER — TRIAMCINOLONE ACETONIDE 1 MG/G
OINTMENT TOPICAL ONCE
OUTPATIENT
Start: 2025-07-24 | End: 2025-07-24

## 2025-07-24 NOTE — PATIENT INSTRUCTIONS
PHYSICIAN ORDERS AND DISCHARGE INSTRUCTIONS     Wound Care Notes:  Sees Dr Kumar.  surgical shoes with pegassist to offload right plantar wound 3/13/25                  Applied for Kerecis grafts on 24  Donated Kerecis applied to left lateral foot 24                             Orders for this week: 2025    Abdomen Burn : Wash with soap and water, pat dry.  Apply Betadine to wound bed   Cover with ca alginate and gentac w/ tegaderm  Change Daily     Right Lower Leg- Clean with Anasept Spray and Gauze  Apply Collactive Ag to wound bed.  Cover with Zetuvit Plus Silicone Border  Leave in place for 1 week    Paint Head wound with Betadine          Plan: HBO workup started 9/10/24.   [facility] discharge due to not home bound 24.   Toenails 2025, 7/3/25      Follow Up Instructions: 1 weeks   Primary Wound Care Provider: Shelly Rubalcava CNP  Call  for any questions or concerns.  Central [phone] for imaging lab work
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
## 2025-07-31 ENCOUNTER — HOSPITAL ENCOUNTER (OUTPATIENT)
Dept: WOUND CARE | Age: 50
Discharge: HOME | End: 2025-07-31
Attending: STUDENT IN AN ORGANIZED HEALTH CARE EDUCATION/TRAINING PROGRAM
Payer: MEDICARE

## 2025-07-31 VITALS
SYSTOLIC BLOOD PRESSURE: 170 MMHG | TEMPERATURE: 96.8 F | DIASTOLIC BLOOD PRESSURE: 84 MMHG | RESPIRATION RATE: 16 BRPM | HEART RATE: 73 BPM

## 2025-07-31 DIAGNOSIS — N18.6 ESRD (END STAGE RENAL DISEASE) ON DIALYSIS (HCC): ICD-10-CM

## 2025-07-31 DIAGNOSIS — E11.621 DIABETIC ULCER OF RIGHT MIDFOOT ASSOCIATED WITH TYPE 2 DIABETES MELLITUS, WITH FAT LAYER EXPOSED (HCC): Primary | ICD-10-CM

## 2025-07-31 DIAGNOSIS — Z79.4 TYPE 2 DIABETES MELLITUS WITH FOOT ULCER, WITH LONG-TERM CURRENT USE OF INSULIN (HCC): ICD-10-CM

## 2025-07-31 DIAGNOSIS — G89.4 CHRONIC PAIN SYNDROME: ICD-10-CM

## 2025-07-31 DIAGNOSIS — Z99.2 ESRD (END STAGE RENAL DISEASE) ON DIALYSIS (HCC): ICD-10-CM

## 2025-07-31 DIAGNOSIS — T21.22XD BURN OF SECOND DEGREE OF ABDOMINAL WALL, SUBSEQUENT ENCOUNTER: ICD-10-CM

## 2025-07-31 DIAGNOSIS — E11.621 TYPE 2 DIABETES MELLITUS WITH FOOT ULCER, WITH LONG-TERM CURRENT USE OF INSULIN (HCC): ICD-10-CM

## 2025-07-31 DIAGNOSIS — L97.412 DIABETIC ULCER OF RIGHT MIDFOOT ASSOCIATED WITH TYPE 2 DIABETES MELLITUS, WITH FAT LAYER EXPOSED (HCC): Primary | ICD-10-CM

## 2025-07-31 DIAGNOSIS — L97.509 TYPE 2 DIABETES MELLITUS WITH FOOT ULCER, WITH LONG-TERM CURRENT USE OF INSULIN (HCC): ICD-10-CM

## 2025-07-31 PROCEDURE — 16020 DRESS/DEBRID P-THICK BURN S: CPT | Performed by: NURSE PRACTITIONER

## 2025-07-31 PROCEDURE — 11042 DBRDMT SUBQ TIS 1ST 20SQCM/<: CPT | Performed by: NURSE PRACTITIONER

## 2025-07-31 PROCEDURE — 11042 DBRDMT SUBQ TIS 1ST 20SQCM/<: CPT

## 2025-07-31 RX ORDER — LIDOCAINE HYDROCHLORIDE 40 MG/ML
SOLUTION TOPICAL ONCE
OUTPATIENT
Start: 2025-07-31 | End: 2025-07-31

## 2025-07-31 RX ORDER — SILVER SULFADIAZINE 10 MG/G
CREAM TOPICAL ONCE
OUTPATIENT
Start: 2025-07-31 | End: 2025-07-31

## 2025-07-31 RX ORDER — MUPIROCIN 2 %
OINTMENT (GRAM) TOPICAL ONCE
OUTPATIENT
Start: 2025-07-31 | End: 2025-07-31

## 2025-07-31 RX ORDER — GENTAMICIN SULFATE 1 MG/G
OINTMENT TOPICAL ONCE
OUTPATIENT
Start: 2025-07-31 | End: 2025-07-31

## 2025-07-31 RX ORDER — NEOMYCIN/BACITRACIN/POLYMYXINB 3.5-400-5K
OINTMENT (GRAM) TOPICAL ONCE
OUTPATIENT
Start: 2025-07-31 | End: 2025-07-31

## 2025-07-31 RX ORDER — TRIAMCINOLONE ACETONIDE 1 MG/G
OINTMENT TOPICAL ONCE
OUTPATIENT
Start: 2025-07-31 | End: 2025-07-31

## 2025-07-31 RX ORDER — LIDOCAINE 50 MG/G
OINTMENT TOPICAL ONCE
OUTPATIENT
Start: 2025-07-31 | End: 2025-07-31

## 2025-07-31 RX ORDER — GINSENG 100 MG
CAPSULE ORAL ONCE
OUTPATIENT
Start: 2025-07-31 | End: 2025-07-31

## 2025-07-31 RX ORDER — OXYCODONE AND ACETAMINOPHEN 7.5; 325 MG/1; MG/1
1 TABLET ORAL EVERY 8 HOURS PRN
Qty: 90 TABLET | Refills: 0 | Status: SHIPPED | OUTPATIENT
Start: 2025-07-31 | End: 2025-08-30

## 2025-07-31 RX ORDER — LIDOCAINE HYDROCHLORIDE 20 MG/ML
JELLY TOPICAL ONCE
OUTPATIENT
Start: 2025-07-31 | End: 2025-07-31

## 2025-07-31 RX ORDER — SODIUM CHLOR/HYPOCHLOROUS ACID 0.033 %
SOLUTION, IRRIGATION IRRIGATION ONCE
OUTPATIENT
Start: 2025-07-31 | End: 2025-07-31

## 2025-07-31 RX ORDER — BETAMETHASONE DIPROPIONATE 0.5 MG/G
CREAM TOPICAL ONCE
OUTPATIENT
Start: 2025-07-31 | End: 2025-07-31

## 2025-07-31 RX ORDER — CLOBETASOL PROPIONATE 0.5 MG/G
OINTMENT TOPICAL ONCE
OUTPATIENT
Start: 2025-07-31 | End: 2025-07-31

## 2025-07-31 RX ORDER — LIDOCAINE 40 MG/G
CREAM TOPICAL ONCE
OUTPATIENT
Start: 2025-07-31 | End: 2025-07-31

## 2025-07-31 RX ORDER — BACITRACIN ZINC AND POLYMYXIN B SULFATE 500; 1000 [USP'U]/G; [USP'U]/G
OINTMENT TOPICAL ONCE
OUTPATIENT
Start: 2025-07-31 | End: 2025-07-31
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
## 2025-07-31 NOTE — PATIENT INSTRUCTIONS
PHYSICIAN ORDERS AND DISCHARGE INSTRUCTIONS     Wound Care Notes:  Sees Dr Kumar.  surgical shoes with pegassist to offload right plantar wound 3/13/25                  Applied for Kerecis grafts on 24  Donated Kerecis applied to left lateral foot 24                             Orders for this week: 2025    Abdomen Burn : Wash with soap and water, pat dry.  Apply Betadine to wound bed   Cover with ca alginate and gentac w/ tegaderm  Change Daily     Right Lower Leg- Clean with Anasept Spray and Gauze  Apply Collactive Ag to wound bed.  Cover with Zetuvit Plus Silicone Border  Leave in place for 1 week      Plan: HBO workup started 9/10/24.   [facility] discharge due to not home bound 24.   Toenails 2025, 7/3/25      Follow Up Instructions: 1 week   Primary Wound Care Provider:   Call  for any questions or concerns.  Central [phone] for imaging lab work

## 2025-08-07 ENCOUNTER — OFFICE VISIT (OUTPATIENT)
Dept: CARDIOLOGY CLINIC | Age: 50
End: 2025-08-07
Payer: MEDICARE

## 2025-08-07 ENCOUNTER — HOSPITAL ENCOUNTER (OUTPATIENT)
Dept: WOUND CARE | Age: 50
Discharge: HOME | End: 2025-08-07
Attending: STUDENT IN AN ORGANIZED HEALTH CARE EDUCATION/TRAINING PROGRAM

## 2025-08-07 VITALS
DIASTOLIC BLOOD PRESSURE: 74 MMHG | WEIGHT: 250.2 LBS | OXYGEN SATURATION: 88 % | SYSTOLIC BLOOD PRESSURE: 136 MMHG | BODY MASS INDEX: 37.06 KG/M2 | HEART RATE: 69 BPM | HEIGHT: 69 IN

## 2025-08-07 DIAGNOSIS — R07.9 CHEST PAIN, UNSPECIFIED TYPE: Primary | ICD-10-CM

## 2025-08-07 DIAGNOSIS — R00.2 PALPITATIONS: ICD-10-CM

## 2025-08-07 PROCEDURE — G8417 CALC BMI ABV UP PARAM F/U: HCPCS | Performed by: INTERNAL MEDICINE

## 2025-08-07 PROCEDURE — 99214 OFFICE O/P EST MOD 30 MIN: CPT | Performed by: INTERNAL MEDICINE

## 2025-08-07 PROCEDURE — 3075F SYST BP GE 130 - 139MM HG: CPT | Performed by: INTERNAL MEDICINE

## 2025-08-07 PROCEDURE — 3078F DIAST BP <80 MM HG: CPT | Performed by: INTERNAL MEDICINE

## 2025-08-07 PROCEDURE — 1036F TOBACCO NON-USER: CPT | Performed by: INTERNAL MEDICINE

## 2025-08-07 PROCEDURE — G8427 DOCREV CUR MEDS BY ELIG CLIN: HCPCS | Performed by: INTERNAL MEDICINE

## 2025-08-07 PROCEDURE — 1111F DSCHRG MED/CURRENT MED MERGE: CPT | Performed by: INTERNAL MEDICINE

## 2025-08-07 PROCEDURE — 3017F COLORECTAL CA SCREEN DOC REV: CPT | Performed by: INTERNAL MEDICINE

## 2025-08-10 ENCOUNTER — APPOINTMENT (OUTPATIENT)
Dept: GENERAL RADIOLOGY | Age: 50
End: 2025-08-10
Attending: STUDENT IN AN ORGANIZED HEALTH CARE EDUCATION/TRAINING PROGRAM
Payer: MEDICARE

## 2025-08-10 ENCOUNTER — HOSPITAL ENCOUNTER (INPATIENT)
Age: 50
LOS: 4 days | Discharge: HOME | End: 2025-08-15
Attending: STUDENT IN AN ORGANIZED HEALTH CARE EDUCATION/TRAINING PROGRAM | Admitting: INTERNAL MEDICINE
Payer: MEDICARE

## 2025-08-10 DIAGNOSIS — S80.211A ABRASION OF RIGHT KNEE, INITIAL ENCOUNTER: ICD-10-CM

## 2025-08-10 DIAGNOSIS — I25.5 ISCHEMIC CARDIOMYOPATHY: ICD-10-CM

## 2025-08-10 DIAGNOSIS — R06.02 SHORTNESS OF BREATH: ICD-10-CM

## 2025-08-10 DIAGNOSIS — N18.6 ESRD (END STAGE RENAL DISEASE) (HCC): ICD-10-CM

## 2025-08-10 DIAGNOSIS — I20.9 ANGINA PECTORIS: ICD-10-CM

## 2025-08-10 DIAGNOSIS — R07.9 CHEST PAIN, UNSPECIFIED TYPE: Primary | ICD-10-CM

## 2025-08-10 LAB
ALBUMIN SERPL-MCNC: 3.8 G/DL (ref 3.4–5)
ALBUMIN/GLOB SERPL: 1.1 {RATIO} (ref 1.1–2.2)
ALP SERPL-CCNC: 85 U/L (ref 40–129)
ALT SERPL-CCNC: 7 U/L (ref 10–40)
ANION GAP SERPL CALCULATED.3IONS-SCNC: 20 MMOL/L (ref 9–17)
AST SERPL-CCNC: 17 U/L (ref 15–37)
BASOPHILS # BLD: 0.05 K/UL
BASOPHILS NFR BLD: 1 % (ref 0–1)
BILIRUB SERPL-MCNC: 0.5 MG/DL (ref 0–1)
BNP SERPL-MCNC: ABNORMAL PG/ML (ref 0–125)
BUN SERPL-MCNC: 73 MG/DL (ref 7–20)
CALCIUM SERPL-MCNC: 8.6 MG/DL (ref 8.3–10.6)
CHLORIDE SERPL-SCNC: 89 MMOL/L (ref 99–110)
CO2 SERPL-SCNC: 26 MMOL/L (ref 21–32)
CREAT SERPL-MCNC: 8.8 MG/DL (ref 0.9–1.3)
EOSINOPHIL # BLD: 0.23 K/UL
EOSINOPHILS RELATIVE PERCENT: 3 % (ref 0–3)
ERYTHROCYTE [DISTWIDTH] IN BLOOD BY AUTOMATED COUNT: 15.9 % (ref 11.7–14.9)
GFR, ESTIMATED: 6 ML/MIN/1.73M2
GLUCOSE SERPL-MCNC: 182 MG/DL (ref 74–99)
HCT VFR BLD AUTO: 34.8 % (ref 42–52)
HGB BLD-MCNC: 11.1 G/DL (ref 13.5–18)
IMM GRANULOCYTES # BLD AUTO: 0.04 K/UL
IMM GRANULOCYTES NFR BLD: 1 %
LIPASE SERPL-CCNC: 47 U/L (ref 13–60)
LYMPHOCYTES NFR BLD: 0.61 K/UL
LYMPHOCYTES RELATIVE PERCENT: 8 % (ref 24–44)
MAGNESIUM SERPL-MCNC: 2.5 MG/DL (ref 1.8–2.4)
MCH RBC QN AUTO: 29.8 PG (ref 27–31)
MCHC RBC AUTO-ENTMCNC: 31.9 G/DL (ref 32–36)
MCV RBC AUTO: 93.3 FL (ref 78–100)
MONOCYTES NFR BLD: 0.71 K/UL
MONOCYTES NFR BLD: 10 % (ref 0–5)
NEUTROPHILS NFR BLD: 78 % (ref 36–66)
NEUTS SEG NFR BLD: 5.74 K/UL
PLATELET # BLD AUTO: 173 K/UL (ref 140–440)
PMV BLD AUTO: 10.5 FL (ref 7.5–11.1)
POTASSIUM SERPL-SCNC: 4.8 MMOL/L (ref 3.5–5.1)
PROT SERPL-MCNC: 7.4 G/DL (ref 6.4–8.2)
RBC # BLD AUTO: 3.73 M/UL (ref 4.6–6.2)
SODIUM SERPL-SCNC: 135 MMOL/L (ref 136–145)
TROPONIN I SERPL HS-MCNC: 205 NG/L (ref 0–22)
WBC OTHER # BLD: 7.4 K/UL (ref 4–10.5)

## 2025-08-10 PROCEDURE — 93005 ELECTROCARDIOGRAM TRACING: CPT | Performed by: STUDENT IN AN ORGANIZED HEALTH CARE EDUCATION/TRAINING PROGRAM

## 2025-08-10 PROCEDURE — 99285 EMERGENCY DEPT VISIT HI MDM: CPT

## 2025-08-10 PROCEDURE — 73562 X-RAY EXAM OF KNEE 3: CPT

## 2025-08-10 PROCEDURE — 96375 TX/PRO/DX INJ NEW DRUG ADDON: CPT

## 2025-08-10 PROCEDURE — 84484 ASSAY OF TROPONIN QUANT: CPT

## 2025-08-10 PROCEDURE — 80053 COMPREHEN METABOLIC PANEL: CPT

## 2025-08-10 PROCEDURE — 83690 ASSAY OF LIPASE: CPT

## 2025-08-10 PROCEDURE — 71045 X-RAY EXAM CHEST 1 VIEW: CPT

## 2025-08-10 PROCEDURE — 90471 IMMUNIZATION ADMIN: CPT | Performed by: STUDENT IN AN ORGANIZED HEALTH CARE EDUCATION/TRAINING PROGRAM

## 2025-08-10 PROCEDURE — 85025 COMPLETE CBC W/AUTO DIFF WBC: CPT

## 2025-08-10 PROCEDURE — 83880 ASSAY OF NATRIURETIC PEPTIDE: CPT

## 2025-08-10 PROCEDURE — 83735 ASSAY OF MAGNESIUM: CPT

## 2025-08-10 PROCEDURE — 6360000002 HC RX W HCPCS: Performed by: STUDENT IN AN ORGANIZED HEALTH CARE EDUCATION/TRAINING PROGRAM

## 2025-08-10 PROCEDURE — 96365 THER/PROPH/DIAG IV INF INIT: CPT

## 2025-08-10 PROCEDURE — 90714 TD VACC NO PRESV 7 YRS+ IM: CPT | Performed by: STUDENT IN AN ORGANIZED HEALTH CARE EDUCATION/TRAINING PROGRAM

## 2025-08-10 RX ORDER — MAGNESIUM SULFATE 1 G/100ML
1000 INJECTION INTRAVENOUS ONCE
Status: COMPLETED | OUTPATIENT
Start: 2025-08-10 | End: 2025-08-11

## 2025-08-10 RX ORDER — MORPHINE SULFATE 4 MG/ML
4 INJECTION, SOLUTION INTRAMUSCULAR; INTRAVENOUS ONCE
Status: COMPLETED | OUTPATIENT
Start: 2025-08-10 | End: 2025-08-10

## 2025-08-10 RX ORDER — NITROGLYCERIN 0.4 MG/1
0.4 TABLET SUBLINGUAL ONCE
Status: COMPLETED | OUTPATIENT
Start: 2025-08-11 | End: 2025-08-11

## 2025-08-10 RX ADMIN — MAGNESIUM SULFATE HEPTAHYDRATE 1000 MG: 1 INJECTION, SOLUTION INTRAVENOUS at 23:27

## 2025-08-10 RX ADMIN — MORPHINE SULFATE 4 MG: 4 INJECTION, SOLUTION INTRAMUSCULAR; INTRAVENOUS at 22:51

## 2025-08-10 RX ADMIN — CLOSTRIDIUM TETANI TOXOID ANTIGEN (FORMALDEHYDE INACTIVATED) AND CORYNEBACTERIUM DIPHTHERIAE TOXOID ANTIGEN (FORMALDEHYDE INACTIVATED) 0.5 ML: 5; 2 INJECTION, SUSPENSION INTRAMUSCULAR at 22:50

## 2025-08-10 ASSESSMENT — PAIN SCALES - GENERAL
PAINLEVEL_OUTOF10: 9
PAINLEVEL_OUTOF10: 9

## 2025-08-10 ASSESSMENT — PAIN - FUNCTIONAL ASSESSMENT: PAIN_FUNCTIONAL_ASSESSMENT: 0-10

## 2025-08-10 ASSESSMENT — PAIN DESCRIPTION - LOCATION: LOCATION: LEG

## 2025-08-11 ENCOUNTER — APPOINTMENT (OUTPATIENT)
Dept: NUCLEAR MEDICINE | Age: 50
End: 2025-08-11
Payer: MEDICARE

## 2025-08-11 ENCOUNTER — APPOINTMENT (OUTPATIENT)
Dept: NON INVASIVE DIAGNOSTICS | Age: 50
End: 2025-08-11
Payer: MEDICARE

## 2025-08-11 ENCOUNTER — APPOINTMENT (OUTPATIENT)
Dept: GENERAL RADIOLOGY | Age: 50
End: 2025-08-11
Payer: MEDICARE

## 2025-08-11 ENCOUNTER — APPOINTMENT (OUTPATIENT)
Dept: NON INVASIVE DIAGNOSTICS | Age: 50
End: 2025-08-11
Attending: INTERNAL MEDICINE
Payer: MEDICARE

## 2025-08-11 LAB
ALBUMIN SERPL-MCNC: 3.7 G/DL (ref 3.4–5)
ALBUMIN/GLOB SERPL: 1.1 {RATIO} (ref 1.1–2.2)
ALP SERPL-CCNC: 81 U/L (ref 40–129)
ALT SERPL-CCNC: 8 U/L (ref 10–40)
ANION GAP SERPL CALCULATED.3IONS-SCNC: 21 MMOL/L (ref 9–17)
AST SERPL-CCNC: 13 U/L (ref 15–37)
BILIRUB SERPL-MCNC: 0.4 MG/DL (ref 0–1)
BUN SERPL-MCNC: 82 MG/DL (ref 7–20)
CALCIUM SERPL-MCNC: 8.2 MG/DL (ref 8.3–10.6)
CHLORIDE SERPL-SCNC: 90 MMOL/L (ref 99–110)
CO2 SERPL-SCNC: 24 MMOL/L (ref 21–32)
CREAT SERPL-MCNC: 9.4 MG/DL (ref 0.9–1.3)
ECHO BSA: 2.39 M2
ECHO BSA: 2.39 M2
ECHO IVC PROX: 2.7 CM
ECHO LV EF PHYSICIAN: 45 %
ECHO LV FRACTIONAL SHORTENING: 36 % (ref 28–44)
ECHO LV INTERNAL DIMENSION DIASTOLE INDEX: 2.29 CM/M2
ECHO LV INTERNAL DIMENSION DIASTOLIC: 5.3 CM (ref 4.2–5.9)
ECHO LV INTERNAL DIMENSION SYSTOLIC INDEX: 1.47 CM/M2
ECHO LV INTERNAL DIMENSION SYSTOLIC: 3.4 CM
ECHO LV IVSD: 1.4 CM (ref 0.6–1)
ECHO LV MASS 2D: 335.5 G (ref 88–224)
ECHO LV MASS INDEX 2D: 145.2 G/M2 (ref 49–115)
ECHO LV POSTERIOR WALL DIASTOLIC: 1.5 CM (ref 0.6–1)
ECHO LV RELATIVE WALL THICKNESS RATIO: 0.57
EKG ATRIAL RATE: 65 BPM
EKG ATRIAL RATE: 76 BPM
EKG DIAGNOSIS: NORMAL
EKG DIAGNOSIS: NORMAL
EKG P AXIS: 22 DEGREES
EKG P AXIS: 45 DEGREES
EKG P-R INTERVAL: 192 MS
EKG P-R INTERVAL: 204 MS
EKG Q-T INTERVAL: 446 MS
EKG Q-T INTERVAL: 478 MS
EKG QRS DURATION: 96 MS
EKG QRS DURATION: 98 MS
EKG QTC CALCULATION (BAZETT): 497 MS
EKG QTC CALCULATION (BAZETT): 501 MS
EKG R AXIS: 64 DEGREES
EKG R AXIS: 68 DEGREES
EKG T AXIS: 90 DEGREES
EKG T AXIS: 96 DEGREES
EKG VENTRICULAR RATE: 65 BPM
EKG VENTRICULAR RATE: 76 BPM
ERYTHROCYTE [DISTWIDTH] IN BLOOD BY AUTOMATED COUNT: 15.4 % (ref 11.7–14.9)
GFR, ESTIMATED: 6 ML/MIN/1.73M2
GLUCOSE BLD-MCNC: 150 MG/DL (ref 74–99)
GLUCOSE BLD-MCNC: 165 MG/DL (ref 74–99)
GLUCOSE BLD-MCNC: 176 MG/DL (ref 74–99)
GLUCOSE SERPL-MCNC: 182 MG/DL (ref 74–99)
HBV CORE AB SER QL: NONREACTIVE
HBV SURFACE AB SERPL IA-ACNC: 3.5 MIU/ML
HBV SURFACE AG SERPL QL IA: NONREACTIVE
HCT VFR BLD AUTO: 33.1 % (ref 42–52)
HGB BLD-MCNC: 10.5 G/DL (ref 13.5–18)
MCH RBC QN AUTO: 29.7 PG (ref 27–31)
MCHC RBC AUTO-ENTMCNC: 31.7 G/DL (ref 32–36)
MCV RBC AUTO: 93.5 FL (ref 78–100)
NUC STRESS EJECTION FRACTION: 40 %
PLATELET # BLD AUTO: 136 K/UL (ref 140–440)
PMV BLD AUTO: 9.8 FL (ref 7.5–11.1)
POTASSIUM SERPL-SCNC: 4.8 MMOL/L (ref 3.5–5.1)
PROT SERPL-MCNC: 7.2 G/DL (ref 6.4–8.2)
RBC # BLD AUTO: 3.54 M/UL (ref 4.6–6.2)
SODIUM SERPL-SCNC: 135 MMOL/L (ref 136–145)
STRESS BASELINE DIAS BP: 47 MMHG
STRESS BASELINE HR: 63 BPM
STRESS BASELINE SYS BP: 144 MMHG
STRESS ESTIMATED WORKLOAD: 1 METS
STRESS PEAK DIAS BP: 65 MMHG
STRESS PEAK SYS BP: 169 MMHG
STRESS PERCENT HR ACHIEVED: 55 %
STRESS POST PEAK HR: 94 BPM
STRESS RATE PRESSURE PRODUCT: NORMAL BPM*MMHG
STRESS TARGET HR: 170 BPM
TID: 1.13
TROPONIN I SERPL HS-MCNC: 200 NG/L (ref 0–22)
WBC OTHER # BLD: 8.3 K/UL (ref 4–10.5)

## 2025-08-11 PROCEDURE — 86317 IMMUNOASSAY INFECTIOUS AGENT: CPT

## 2025-08-11 PROCEDURE — 93017 CV STRESS TEST TRACING ONLY: CPT

## 2025-08-11 PROCEDURE — 74018 RADEX ABDOMEN 1 VIEW: CPT

## 2025-08-11 PROCEDURE — 93010 ELECTROCARDIOGRAM REPORT: CPT | Performed by: INTERNAL MEDICINE

## 2025-08-11 PROCEDURE — 6370000000 HC RX 637 (ALT 250 FOR IP): Performed by: STUDENT IN AN ORGANIZED HEALTH CARE EDUCATION/TRAINING PROGRAM

## 2025-08-11 PROCEDURE — 96375 TX/PRO/DX INJ NEW DRUG ADDON: CPT

## 2025-08-11 PROCEDURE — 78452 HT MUSCLE IMAGE SPECT MULT: CPT | Performed by: INTERNAL MEDICINE

## 2025-08-11 PROCEDURE — A9500 TC99M SESTAMIBI: HCPCS | Performed by: INTERNAL MEDICINE

## 2025-08-11 PROCEDURE — 6370000000 HC RX 637 (ALT 250 FOR IP): Performed by: PHYSICIAN ASSISTANT

## 2025-08-11 PROCEDURE — 90935 HEMODIALYSIS ONE EVALUATION: CPT

## 2025-08-11 PROCEDURE — 3430000000 HC RX DIAGNOSTIC RADIOPHARMACEUTICAL: Performed by: INTERNAL MEDICINE

## 2025-08-11 PROCEDURE — 93018 CV STRESS TEST I&R ONLY: CPT | Performed by: INTERNAL MEDICINE

## 2025-08-11 PROCEDURE — 93325 DOPPLER ECHO COLOR FLOW MAPG: CPT | Performed by: INTERNAL MEDICINE

## 2025-08-11 PROCEDURE — 94640 AIRWAY INHALATION TREATMENT: CPT

## 2025-08-11 PROCEDURE — 87340 HEPATITIS B SURFACE AG IA: CPT

## 2025-08-11 PROCEDURE — 6360000002 HC RX W HCPCS: Performed by: STUDENT IN AN ORGANIZED HEALTH CARE EDUCATION/TRAINING PROGRAM

## 2025-08-11 PROCEDURE — 93308 TTE F-UP OR LMTD: CPT | Performed by: INTERNAL MEDICINE

## 2025-08-11 PROCEDURE — 93016 CV STRESS TEST SUPVJ ONLY: CPT | Performed by: INTERNAL MEDICINE

## 2025-08-11 PROCEDURE — 5A1D70Z PERFORMANCE OF URINARY FILTRATION, INTERMITTENT, LESS THAN 6 HOURS PER DAY: ICD-10-PCS | Performed by: INTERNAL MEDICINE

## 2025-08-11 PROCEDURE — 80053 COMPREHEN METABOLIC PANEL: CPT

## 2025-08-11 PROCEDURE — 93005 ELECTROCARDIOGRAM TRACING: CPT | Performed by: STUDENT IN AN ORGANIZED HEALTH CARE EDUCATION/TRAINING PROGRAM

## 2025-08-11 PROCEDURE — 2700000000 HC OXYGEN THERAPY PER DAY

## 2025-08-11 PROCEDURE — 6360000002 HC RX W HCPCS: Performed by: INTERNAL MEDICINE

## 2025-08-11 PROCEDURE — 99223 1ST HOSP IP/OBS HIGH 75: CPT | Performed by: INTERNAL MEDICINE

## 2025-08-11 PROCEDURE — 86704 HEP B CORE ANTIBODY TOTAL: CPT

## 2025-08-11 PROCEDURE — 1200000000 HC SEMI PRIVATE

## 2025-08-11 PROCEDURE — 36415 COLL VENOUS BLD VENIPUNCTURE: CPT

## 2025-08-11 PROCEDURE — 94761 N-INVAS EAR/PLS OXIMETRY MLT: CPT

## 2025-08-11 PROCEDURE — 94664 DEMO&/EVAL PT USE INHALER: CPT

## 2025-08-11 PROCEDURE — 93321 DOPPLER ECHO F-UP/LMTD STD: CPT

## 2025-08-11 PROCEDURE — 85027 COMPLETE CBC AUTOMATED: CPT

## 2025-08-11 PROCEDURE — 78452 HT MUSCLE IMAGE SPECT MULT: CPT

## 2025-08-11 PROCEDURE — 84484 ASSAY OF TROPONIN QUANT: CPT

## 2025-08-11 PROCEDURE — 82962 GLUCOSE BLOOD TEST: CPT

## 2025-08-11 RX ORDER — HYDRALAZINE HYDROCHLORIDE 25 MG/1
25 TABLET, FILM COATED ORAL 2 TIMES DAILY
Status: DISCONTINUED | OUTPATIENT
Start: 2025-08-11 | End: 2025-08-15 | Stop reason: HOSPADM

## 2025-08-11 RX ORDER — ISOSORBIDE MONONITRATE 30 MG/1
15 TABLET, EXTENDED RELEASE ORAL DAILY
Status: DISCONTINUED | OUTPATIENT
Start: 2025-08-11 | End: 2025-08-12

## 2025-08-11 RX ORDER — ONDANSETRON 2 MG/ML
4 INJECTION INTRAMUSCULAR; INTRAVENOUS EVERY 6 HOURS PRN
Status: DISCONTINUED | OUTPATIENT
Start: 2025-08-11 | End: 2025-08-15 | Stop reason: HOSPADM

## 2025-08-11 RX ORDER — BUDESONIDE AND FORMOTEROL FUMARATE DIHYDRATE 160; 4.5 UG/1; UG/1
2 AEROSOL RESPIRATORY (INHALATION)
Status: DISCONTINUED | OUTPATIENT
Start: 2025-08-11 | End: 2025-08-15 | Stop reason: HOSPADM

## 2025-08-11 RX ORDER — PANTOPRAZOLE SODIUM 40 MG/10ML
40 INJECTION, POWDER, LYOPHILIZED, FOR SOLUTION INTRAVENOUS 2 TIMES DAILY
Status: DISCONTINUED | OUTPATIENT
Start: 2025-08-12 | End: 2025-08-15 | Stop reason: HOSPADM

## 2025-08-11 RX ORDER — ONDANSETRON 2 MG/ML
4 INJECTION INTRAMUSCULAR; INTRAVENOUS ONCE
Status: COMPLETED | OUTPATIENT
Start: 2025-08-11 | End: 2025-08-11

## 2025-08-11 RX ORDER — TETRAKIS(2-METHOXYISOBUTYLISOCYANIDE)COPPER(I) TETRAFLUOROBORATE 1 MG/ML
10 INJECTION, POWDER, LYOPHILIZED, FOR SOLUTION INTRAVENOUS
Status: COMPLETED | OUTPATIENT
Start: 2025-08-11 | End: 2025-08-11

## 2025-08-11 RX ORDER — PANTOPRAZOLE SODIUM 40 MG/10ML
40 INJECTION, POWDER, LYOPHILIZED, FOR SOLUTION INTRAVENOUS ONCE
Status: COMPLETED | OUTPATIENT
Start: 2025-08-11 | End: 2025-08-11

## 2025-08-11 RX ORDER — REGADENOSON 0.08 MG/ML
0.4 INJECTION, SOLUTION INTRAVENOUS
Status: COMPLETED | OUTPATIENT
Start: 2025-08-11 | End: 2025-08-11

## 2025-08-11 RX ORDER — ATORVASTATIN CALCIUM 40 MG/1
40 TABLET, FILM COATED ORAL NIGHTLY
Status: DISCONTINUED | OUTPATIENT
Start: 2025-08-11 | End: 2025-08-15 | Stop reason: HOSPADM

## 2025-08-11 RX ORDER — TRAZODONE HYDROCHLORIDE 50 MG/1
150 TABLET ORAL NIGHTLY PRN
Status: DISCONTINUED | OUTPATIENT
Start: 2025-08-11 | End: 2025-08-15 | Stop reason: HOSPADM

## 2025-08-11 RX ORDER — DEXTROSE MONOHYDRATE 100 MG/ML
INJECTION, SOLUTION INTRAVENOUS CONTINUOUS PRN
Status: DISCONTINUED | OUTPATIENT
Start: 2025-08-11 | End: 2025-08-15 | Stop reason: HOSPADM

## 2025-08-11 RX ORDER — METOPROLOL TARTRATE 25 MG/1
12.5 TABLET, FILM COATED ORAL 2 TIMES DAILY
Status: DISCONTINUED | OUTPATIENT
Start: 2025-08-11 | End: 2025-08-15 | Stop reason: HOSPADM

## 2025-08-11 RX ORDER — CALCITRIOL 0.25 UG/1
0.5 CAPSULE, LIQUID FILLED ORAL 2 TIMES DAILY
Status: DISCONTINUED | OUTPATIENT
Start: 2025-08-11 | End: 2025-08-15 | Stop reason: HOSPADM

## 2025-08-11 RX ORDER — ACETAMINOPHEN 650 MG/1
650 SUPPOSITORY RECTAL EVERY 6 HOURS PRN
Status: DISCONTINUED | OUTPATIENT
Start: 2025-08-11 | End: 2025-08-15 | Stop reason: HOSPADM

## 2025-08-11 RX ORDER — HEPARIN SODIUM 5000 [USP'U]/ML
5000 INJECTION, SOLUTION INTRAVENOUS; SUBCUTANEOUS EVERY 8 HOURS SCHEDULED
Status: DISCONTINUED | OUTPATIENT
Start: 2025-08-11 | End: 2025-08-15 | Stop reason: HOSPADM

## 2025-08-11 RX ORDER — PANTOPRAZOLE SODIUM 40 MG/10ML
40 INJECTION, POWDER, LYOPHILIZED, FOR SOLUTION INTRAVENOUS DAILY
Status: DISCONTINUED | OUTPATIENT
Start: 2025-08-12 | End: 2025-08-11

## 2025-08-11 RX ORDER — OXYCODONE AND ACETAMINOPHEN 7.5; 325 MG/1; MG/1
1 TABLET ORAL EVERY 8 HOURS PRN
Refills: 0 | Status: DISCONTINUED | OUTPATIENT
Start: 2025-08-11 | End: 2025-08-15 | Stop reason: HOSPADM

## 2025-08-11 RX ORDER — ROPINIROLE 0.25 MG/1
0.25 TABLET, FILM COATED ORAL NIGHTLY
Status: DISCONTINUED | OUTPATIENT
Start: 2025-08-11 | End: 2025-08-15 | Stop reason: HOSPADM

## 2025-08-11 RX ORDER — SODIUM PHOSPHATE, DIBASIC AND SODIUM PHOSPHATE, MONOBASIC 7; 19 G/230ML; G/230ML
1 ENEMA RECTAL 2 TIMES DAILY
Status: DISPENSED | OUTPATIENT
Start: 2025-08-11 | End: 2025-08-11

## 2025-08-11 RX ORDER — TETRAKIS(2-METHOXYISOBUTYLISOCYANIDE)COPPER(I) TETRAFLUOROBORATE 1 MG/ML
29 INJECTION, POWDER, LYOPHILIZED, FOR SOLUTION INTRAVENOUS
Status: COMPLETED | OUTPATIENT
Start: 2025-08-11 | End: 2025-08-11

## 2025-08-11 RX ORDER — ASPIRIN 81 MG/1
81 TABLET, CHEWABLE ORAL DAILY
Status: DISCONTINUED | OUTPATIENT
Start: 2025-08-12 | End: 2025-08-15 | Stop reason: HOSPADM

## 2025-08-11 RX ORDER — FUROSEMIDE 10 MG/ML
80 INJECTION INTRAMUSCULAR; INTRAVENOUS ONCE
Status: COMPLETED | OUTPATIENT
Start: 2025-08-11 | End: 2025-08-11

## 2025-08-11 RX ORDER — GLUCAGON 1 MG/ML
1 KIT INJECTION PRN
Status: DISCONTINUED | OUTPATIENT
Start: 2025-08-11 | End: 2025-08-15 | Stop reason: HOSPADM

## 2025-08-11 RX ORDER — POLYETHYLENE GLYCOL 3350 17 G/17G
119 POWDER, FOR SOLUTION ORAL 2 TIMES DAILY
Status: DISCONTINUED | OUTPATIENT
Start: 2025-08-11 | End: 2025-08-12

## 2025-08-11 RX ORDER — PANTOPRAZOLE SODIUM 40 MG/1
40 TABLET, DELAYED RELEASE ORAL DAILY
Status: DISCONTINUED | OUTPATIENT
Start: 2025-08-11 | End: 2025-08-11

## 2025-08-11 RX ORDER — ACETAMINOPHEN 325 MG/1
650 TABLET ORAL EVERY 6 HOURS PRN
Status: DISCONTINUED | OUTPATIENT
Start: 2025-08-11 | End: 2025-08-15 | Stop reason: HOSPADM

## 2025-08-11 RX ORDER — PROMETHAZINE HYDROCHLORIDE 25 MG/ML
6.25 INJECTION, SOLUTION INTRAMUSCULAR; INTRAVENOUS ONCE
Status: COMPLETED | OUTPATIENT
Start: 2025-08-11 | End: 2025-08-11

## 2025-08-11 RX ORDER — CLOPIDOGREL BISULFATE 75 MG/1
75 TABLET ORAL DAILY
Status: DISCONTINUED | OUTPATIENT
Start: 2025-08-11 | End: 2025-08-15 | Stop reason: HOSPADM

## 2025-08-11 RX ORDER — INSULIN LISPRO 100 [IU]/ML
0-4 INJECTION, SOLUTION INTRAVENOUS; SUBCUTANEOUS EVERY 6 HOURS SCHEDULED
Status: DISCONTINUED | OUTPATIENT
Start: 2025-08-11 | End: 2025-08-14

## 2025-08-11 RX ADMIN — HYDROMORPHONE HYDROCHLORIDE 0.5 MG: 1 INJECTION, SOLUTION INTRAMUSCULAR; INTRAVENOUS; SUBCUTANEOUS at 20:00

## 2025-08-11 RX ADMIN — ONDANSETRON 4 MG: 2 INJECTION INTRAMUSCULAR; INTRAVENOUS at 00:06

## 2025-08-11 RX ADMIN — PANTOPRAZOLE SODIUM 40 MG: 40 INJECTION, POWDER, FOR SOLUTION INTRAVENOUS at 07:01

## 2025-08-11 RX ADMIN — CALCITRIOL CAPSULES 0.25 MCG 0.5 MCG: 0.25 CAPSULE ORAL at 09:32

## 2025-08-11 RX ADMIN — ATORVASTATIN CALCIUM 40 MG: 40 TABLET, FILM COATED ORAL at 20:01

## 2025-08-11 RX ADMIN — POLYETHYLENE GLYCOL (3350) 119 G: 17 POWDER, FOR SOLUTION ORAL at 20:01

## 2025-08-11 RX ADMIN — KIT FOR THE PREPARATION OF TECHNETIUM TC99M SESTAMIBI 29 MILLICURIE: 1 INJECTION, POWDER, LYOPHILIZED, FOR SOLUTION PARENTERAL at 15:00

## 2025-08-11 RX ADMIN — HYDRALAZINE HYDROCHLORIDE 25 MG: 25 TABLET ORAL at 09:35

## 2025-08-11 RX ADMIN — HYDRALAZINE HYDROCHLORIDE 25 MG: 25 TABLET ORAL at 20:01

## 2025-08-11 RX ADMIN — CLOPIDOGREL BISULFATE 75 MG: 75 TABLET, FILM COATED ORAL at 09:35

## 2025-08-11 RX ADMIN — REGADENOSON 0.4 MG: 0.08 INJECTION, SOLUTION INTRAVENOUS at 14:56

## 2025-08-11 RX ADMIN — NITROGLYCERIN 0.4 MG: 0.4 TABLET SUBLINGUAL at 00:06

## 2025-08-11 RX ADMIN — BUDESONIDE AND FORMOTEROL FUMARATE DIHYDRATE 2 PUFF: 160; 4.5 AEROSOL RESPIRATORY (INHALATION) at 07:45

## 2025-08-11 RX ADMIN — TRAZODONE HYDROCHLORIDE 150 MG: 50 TABLET ORAL at 23:27

## 2025-08-11 RX ADMIN — CALCITRIOL CAPSULES 0.25 MCG 0.5 MCG: 0.25 CAPSULE ORAL at 20:01

## 2025-08-11 RX ADMIN — ISOSORBIDE MONONITRATE 15 MG: 30 TABLET, EXTENDED RELEASE ORAL at 09:34

## 2025-08-11 RX ADMIN — PROMETHAZINE HYDROCHLORIDE 6.25 MG: 25 INJECTION INTRAMUSCULAR; INTRAVENOUS at 07:06

## 2025-08-11 RX ADMIN — HYDROMORPHONE HYDROCHLORIDE 0.5 MG: 1 INJECTION, SOLUTION INTRAMUSCULAR; INTRAVENOUS; SUBCUTANEOUS at 03:16

## 2025-08-11 RX ADMIN — METOPROLOL TARTRATE 12.5 MG: 25 TABLET, FILM COATED ORAL at 20:01

## 2025-08-11 RX ADMIN — OXYCODONE HYDROCHLORIDE AND ACETAMINOPHEN 1 TABLET: 7.5; 325 TABLET ORAL at 09:35

## 2025-08-11 RX ADMIN — BUDESONIDE AND FORMOTEROL FUMARATE DIHYDRATE 2 PUFF: 160; 4.5 AEROSOL RESPIRATORY (INHALATION) at 21:29

## 2025-08-11 RX ADMIN — OXYCODONE HYDROCHLORIDE AND ACETAMINOPHEN 1 TABLET: 7.5; 325 TABLET ORAL at 23:27

## 2025-08-11 RX ADMIN — ROPINIROLE HYDROCHLORIDE 0.25 MG: 0.25 TABLET, FILM COATED ORAL at 20:01

## 2025-08-11 RX ADMIN — METOPROLOL TARTRATE 12.5 MG: 25 TABLET, FILM COATED ORAL at 09:34

## 2025-08-11 RX ADMIN — FUROSEMIDE 80 MG: 10 INJECTION, SOLUTION INTRAMUSCULAR; INTRAVENOUS at 03:17

## 2025-08-11 RX ADMIN — HEPARIN SODIUM 5000 UNITS: 5000 INJECTION INTRAVENOUS; SUBCUTANEOUS at 20:07

## 2025-08-11 RX ADMIN — KIT FOR THE PREPARATION OF TECHNETIUM TC99M SESTAMIBI 10 MILLICURIE: 1 INJECTION, POWDER, LYOPHILIZED, FOR SOLUTION PARENTERAL at 11:10

## 2025-08-11 RX ADMIN — ONDANSETRON 4 MG: 2 INJECTION INTRAMUSCULAR; INTRAVENOUS at 03:14

## 2025-08-11 ASSESSMENT — PAIN DESCRIPTION - FREQUENCY
FREQUENCY: INTERMITTENT
FREQUENCY: INTERMITTENT

## 2025-08-11 ASSESSMENT — PAIN DESCRIPTION - DESCRIPTORS
DESCRIPTORS: DISCOMFORT
DESCRIPTORS: ACHING
DESCRIPTORS: ACHING;STABBING
DESCRIPTORS: ACHING

## 2025-08-11 ASSESSMENT — PAIN SCALES - GENERAL
PAINLEVEL_OUTOF10: 0
PAINLEVEL_OUTOF10: 9
PAINLEVEL_OUTOF10: 7
PAINLEVEL_OUTOF10: 0
PAINLEVEL_OUTOF10: 6
PAINLEVEL_OUTOF10: 7
PAINLEVEL_OUTOF10: 7
PAINLEVEL_OUTOF10: 8
PAINLEVEL_OUTOF10: 0

## 2025-08-11 ASSESSMENT — PAIN DESCRIPTION - ONSET
ONSET: ON-GOING
ONSET: ON-GOING

## 2025-08-11 ASSESSMENT — PAIN DESCRIPTION - ORIENTATION
ORIENTATION: RIGHT

## 2025-08-11 ASSESSMENT — PAIN DESCRIPTION - LOCATION
LOCATION: LEG;BACK
LOCATION: KNEE
LOCATION: KNEE
LOCATION: KNEE;LEG

## 2025-08-11 ASSESSMENT — PAIN SCALES - WONG BAKER
WONGBAKER_NUMERICALRESPONSE: HURTS A LITTLE BIT
WONGBAKER_NUMERICALRESPONSE: NO HURT
WONGBAKER_NUMERICALRESPONSE: NO HURT

## 2025-08-11 ASSESSMENT — PAIN DESCRIPTION - PAIN TYPE
TYPE: CHRONIC PAIN
TYPE: ACUTE PAIN
TYPE: ACUTE PAIN

## 2025-08-11 ASSESSMENT — PAIN - FUNCTIONAL ASSESSMENT
PAIN_FUNCTIONAL_ASSESSMENT: 0-10
PAIN_FUNCTIONAL_ASSESSMENT: 0-10
PAIN_FUNCTIONAL_ASSESSMENT: ACTIVITIES ARE NOT PREVENTED
PAIN_FUNCTIONAL_ASSESSMENT: 0-10
PAIN_FUNCTIONAL_ASSESSMENT: 0-10
PAIN_FUNCTIONAL_ASSESSMENT: ACTIVITIES ARE NOT PREVENTED
PAIN_FUNCTIONAL_ASSESSMENT: ACTIVITIES ARE NOT PREVENTED
PAIN_FUNCTIONAL_ASSESSMENT: 0-10
PAIN_FUNCTIONAL_ASSESSMENT: 0-10

## 2025-08-12 ENCOUNTER — APPOINTMENT (OUTPATIENT)
Dept: GENERAL RADIOLOGY | Age: 50
End: 2025-08-12
Payer: MEDICARE

## 2025-08-12 LAB
ALBUMIN SERPL-MCNC: 3.6 G/DL (ref 3.4–5)
ALBUMIN/GLOB SERPL: 1.1 {RATIO} (ref 1.1–2.2)
ALP SERPL-CCNC: 76 U/L (ref 40–129)
ALT SERPL-CCNC: 7 U/L (ref 10–40)
ANION GAP SERPL CALCULATED.3IONS-SCNC: 18 MMOL/L (ref 9–17)
AST SERPL-CCNC: 14 U/L (ref 15–37)
BASOPHILS # BLD: 0.04 K/UL
BASOPHILS NFR BLD: 1 % (ref 0–1)
BILIRUB SERPL-MCNC: 0.5 MG/DL (ref 0–1)
BUN SERPL-MCNC: 62 MG/DL (ref 7–20)
CALCIUM SERPL-MCNC: 8.3 MG/DL (ref 8.3–10.6)
CHLORIDE SERPL-SCNC: 95 MMOL/L (ref 99–110)
CO2 SERPL-SCNC: 24 MMOL/L (ref 21–32)
CREAT SERPL-MCNC: 7.6 MG/DL (ref 0.9–1.3)
EOSINOPHIL # BLD: 0.23 K/UL
EOSINOPHILS RELATIVE PERCENT: 3 % (ref 0–3)
ERYTHROCYTE [DISTWIDTH] IN BLOOD BY AUTOMATED COUNT: 15.4 % (ref 11.7–14.9)
GFR, ESTIMATED: 8 ML/MIN/1.73M2
GLUCOSE BLD-MCNC: 142 MG/DL (ref 74–99)
GLUCOSE BLD-MCNC: 144 MG/DL (ref 74–99)
GLUCOSE BLD-MCNC: 161 MG/DL (ref 74–99)
GLUCOSE BLD-MCNC: 164 MG/DL (ref 74–99)
GLUCOSE SERPL-MCNC: 166 MG/DL (ref 74–99)
HCT VFR BLD AUTO: 31.6 % (ref 42–52)
HGB BLD-MCNC: 9.9 G/DL (ref 13.5–18)
IMM GRANULOCYTES # BLD AUTO: 0.02 K/UL
IMM GRANULOCYTES NFR BLD: 0 %
LYMPHOCYTES NFR BLD: 0.34 K/UL
LYMPHOCYTES RELATIVE PERCENT: 5 % (ref 24–44)
MCH RBC QN AUTO: 30.4 PG (ref 27–31)
MCHC RBC AUTO-ENTMCNC: 31.3 G/DL (ref 32–36)
MCV RBC AUTO: 96.9 FL (ref 78–100)
MONOCYTES NFR BLD: 0.53 K/UL
MONOCYTES NFR BLD: 8 % (ref 0–5)
NEUTROPHILS NFR BLD: 83 % (ref 36–66)
NEUTS SEG NFR BLD: 5.57 K/UL
PLATELET # BLD AUTO: 147 K/UL (ref 140–440)
PMV BLD AUTO: 9.8 FL (ref 7.5–11.1)
POTASSIUM SERPL-SCNC: 4.1 MMOL/L (ref 3.5–5.1)
PROT SERPL-MCNC: 6.8 G/DL (ref 6.4–8.2)
RBC # BLD AUTO: 3.26 M/UL (ref 4.6–6.2)
SODIUM SERPL-SCNC: 137 MMOL/L (ref 136–145)
WBC OTHER # BLD: 6.7 K/UL (ref 4–10.5)

## 2025-08-12 PROCEDURE — 94640 AIRWAY INHALATION TREATMENT: CPT

## 2025-08-12 PROCEDURE — 6370000000 HC RX 637 (ALT 250 FOR IP): Performed by: STUDENT IN AN ORGANIZED HEALTH CARE EDUCATION/TRAINING PROGRAM

## 2025-08-12 PROCEDURE — 6370000000 HC RX 637 (ALT 250 FOR IP): Performed by: PHYSICIAN ASSISTANT

## 2025-08-12 PROCEDURE — 36415 COLL VENOUS BLD VENIPUNCTURE: CPT

## 2025-08-12 PROCEDURE — 2500000003 HC RX 250 WO HCPCS

## 2025-08-12 PROCEDURE — 85025 COMPLETE CBC W/AUTO DIFF WBC: CPT

## 2025-08-12 PROCEDURE — 74018 RADEX ABDOMEN 1 VIEW: CPT

## 2025-08-12 PROCEDURE — 94761 N-INVAS EAR/PLS OXIMETRY MLT: CPT

## 2025-08-12 PROCEDURE — 1200000000 HC SEMI PRIVATE

## 2025-08-12 PROCEDURE — 2700000000 HC OXYGEN THERAPY PER DAY

## 2025-08-12 PROCEDURE — 99233 SBSQ HOSP IP/OBS HIGH 50: CPT | Performed by: INTERNAL MEDICINE

## 2025-08-12 PROCEDURE — 82962 GLUCOSE BLOOD TEST: CPT

## 2025-08-12 PROCEDURE — APPNB15 APP NON BILLABLE TIME 0-15 MINS

## 2025-08-12 PROCEDURE — 6360000002 HC RX W HCPCS: Performed by: STUDENT IN AN ORGANIZED HEALTH CARE EDUCATION/TRAINING PROGRAM

## 2025-08-12 PROCEDURE — 80053 COMPREHEN METABOLIC PANEL: CPT

## 2025-08-12 PROCEDURE — 6360000002 HC RX W HCPCS: Performed by: PHYSICIAN ASSISTANT

## 2025-08-12 RX ORDER — POLYETHYLENE GLYCOL 3350 17 G/17G
119 POWDER, FOR SOLUTION ORAL ONCE
Status: COMPLETED | OUTPATIENT
Start: 2025-08-12 | End: 2025-08-12

## 2025-08-12 RX ORDER — SODIUM PHOSPHATE, DIBASIC AND SODIUM PHOSPHATE, MONOBASIC 7; 19 G/230ML; G/230ML
1 ENEMA RECTAL 2 TIMES DAILY
Status: DISPENSED | OUTPATIENT
Start: 2025-08-12 | End: 2025-08-13

## 2025-08-12 RX ORDER — SUCRALFATE 1 G/1
1 TABLET ORAL
Status: DISCONTINUED | OUTPATIENT
Start: 2025-08-12 | End: 2025-08-15 | Stop reason: HOSPADM

## 2025-08-12 RX ORDER — ISOSORBIDE MONONITRATE 30 MG/1
30 TABLET, EXTENDED RELEASE ORAL DAILY
Status: DISCONTINUED | OUTPATIENT
Start: 2025-08-13 | End: 2025-08-15 | Stop reason: HOSPADM

## 2025-08-12 RX ORDER — SODIUM CHLORIDE 9 MG/ML
INJECTION, SOLUTION INTRAMUSCULAR; INTRAVENOUS; SUBCUTANEOUS
Status: COMPLETED
Start: 2025-08-12 | End: 2025-08-12

## 2025-08-12 RX ADMIN — ASPIRIN 81 MG: 81 TABLET, CHEWABLE ORAL at 11:12

## 2025-08-12 RX ADMIN — HYDROMORPHONE HYDROCHLORIDE 0.5 MG: 1 INJECTION, SOLUTION INTRAMUSCULAR; INTRAVENOUS; SUBCUTANEOUS at 11:13

## 2025-08-12 RX ADMIN — HYDRALAZINE HYDROCHLORIDE 25 MG: 25 TABLET ORAL at 11:12

## 2025-08-12 RX ADMIN — HYDRALAZINE HYDROCHLORIDE 25 MG: 25 TABLET ORAL at 21:50

## 2025-08-12 RX ADMIN — HEPARIN SODIUM 5000 UNITS: 5000 INJECTION INTRAVENOUS; SUBCUTANEOUS at 15:10

## 2025-08-12 RX ADMIN — ROPINIROLE HYDROCHLORIDE 0.25 MG: 0.25 TABLET, FILM COATED ORAL at 21:50

## 2025-08-12 RX ADMIN — SUCRALFATE 1 G: 1 TABLET ORAL at 17:30

## 2025-08-12 RX ADMIN — SUCRALFATE 1 G: 1 TABLET ORAL at 11:12

## 2025-08-12 RX ADMIN — CALCITRIOL CAPSULES 0.25 MCG 0.5 MCG: 0.25 CAPSULE ORAL at 11:12

## 2025-08-12 RX ADMIN — HYDROMORPHONE HYDROCHLORIDE 0.5 MG: 1 INJECTION, SOLUTION INTRAMUSCULAR; INTRAVENOUS; SUBCUTANEOUS at 06:53

## 2025-08-12 RX ADMIN — CLOPIDOGREL BISULFATE 75 MG: 75 TABLET, FILM COATED ORAL at 11:13

## 2025-08-12 RX ADMIN — ISOSORBIDE MONONITRATE 15 MG: 30 TABLET, EXTENDED RELEASE ORAL at 10:00

## 2025-08-12 RX ADMIN — HEPARIN SODIUM 5000 UNITS: 5000 INJECTION INTRAVENOUS; SUBCUTANEOUS at 21:50

## 2025-08-12 RX ADMIN — ATORVASTATIN CALCIUM 40 MG: 40 TABLET, FILM COATED ORAL at 21:50

## 2025-08-12 RX ADMIN — METOPROLOL TARTRATE 12.5 MG: 25 TABLET, FILM COATED ORAL at 11:12

## 2025-08-12 RX ADMIN — POLYETHYLENE GLYCOL (3350) 119 G: 17 POWDER, FOR SOLUTION ORAL at 15:10

## 2025-08-12 RX ADMIN — METOPROLOL TARTRATE 12.5 MG: 25 TABLET, FILM COATED ORAL at 21:50

## 2025-08-12 RX ADMIN — HEPARIN SODIUM 5000 UNITS: 5000 INJECTION INTRAVENOUS; SUBCUTANEOUS at 06:53

## 2025-08-12 RX ADMIN — CALCITRIOL CAPSULES 0.25 MCG 0.5 MCG: 0.25 CAPSULE ORAL at 21:50

## 2025-08-12 RX ADMIN — SODIUM CHLORIDE 10 ML: 9 INJECTION INTRAMUSCULAR; INTRAVENOUS; SUBCUTANEOUS at 21:40

## 2025-08-12 RX ADMIN — OXYCODONE HYDROCHLORIDE AND ACETAMINOPHEN 1 TABLET: 7.5; 325 TABLET ORAL at 12:41

## 2025-08-12 RX ADMIN — ONDANSETRON 4 MG: 2 INJECTION INTRAMUSCULAR; INTRAVENOUS at 06:55

## 2025-08-12 RX ADMIN — SUCRALFATE 1 G: 1 TABLET ORAL at 21:50

## 2025-08-12 RX ADMIN — OXYCODONE HYDROCHLORIDE AND ACETAMINOPHEN 1 TABLET: 7.5; 325 TABLET ORAL at 21:50

## 2025-08-12 RX ADMIN — HYDROMORPHONE HYDROCHLORIDE 0.5 MG: 1 INJECTION, SOLUTION INTRAMUSCULAR; INTRAVENOUS; SUBCUTANEOUS at 18:25

## 2025-08-12 RX ADMIN — TRAZODONE HYDROCHLORIDE 150 MG: 50 TABLET ORAL at 21:56

## 2025-08-12 RX ADMIN — PANTOPRAZOLE SODIUM 40 MG: 40 INJECTION, POWDER, FOR SOLUTION INTRAVENOUS at 21:40

## 2025-08-12 RX ADMIN — BUDESONIDE AND FORMOTEROL FUMARATE DIHYDRATE 2 PUFF: 160; 4.5 AEROSOL RESPIRATORY (INHALATION) at 07:18

## 2025-08-12 RX ADMIN — PANTOPRAZOLE SODIUM 40 MG: 40 INJECTION, POWDER, FOR SOLUTION INTRAVENOUS at 11:13

## 2025-08-12 RX ADMIN — BUDESONIDE AND FORMOTEROL FUMARATE DIHYDRATE 2 PUFF: 160; 4.5 AEROSOL RESPIRATORY (INHALATION) at 20:31

## 2025-08-12 RX ADMIN — ONDANSETRON 4 MG: 2 INJECTION INTRAMUSCULAR; INTRAVENOUS at 21:39

## 2025-08-12 ASSESSMENT — PAIN DESCRIPTION - DESCRIPTORS
DESCRIPTORS: ACHING
DESCRIPTORS: ACHING;SORE;TENDER
DESCRIPTORS: ACHING
DESCRIPTORS: SHARP;THROBBING
DESCRIPTORS: ACHING;TENDER

## 2025-08-12 ASSESSMENT — PAIN DESCRIPTION - LOCATION
LOCATION: KNEE
LOCATION: BACK;LEG;KNEE
LOCATION: LEG;BACK;KNEE
LOCATION: BACK;LEG;KNEE
LOCATION: LEG;BACK;KNEE

## 2025-08-12 ASSESSMENT — PAIN DESCRIPTION - ORIENTATION
ORIENTATION: RIGHT
ORIENTATION: MID;RIGHT
ORIENTATION: RIGHT;MID

## 2025-08-12 ASSESSMENT — PAIN - FUNCTIONAL ASSESSMENT
PAIN_FUNCTIONAL_ASSESSMENT: PREVENTS OR INTERFERES SOME ACTIVE ACTIVITIES AND ADLS
PAIN_FUNCTIONAL_ASSESSMENT: 0-10
PAIN_FUNCTIONAL_ASSESSMENT: PREVENTS OR INTERFERES SOME ACTIVE ACTIVITIES AND ADLS
PAIN_FUNCTIONAL_ASSESSMENT: 0-10
PAIN_FUNCTIONAL_ASSESSMENT: ACTIVITIES ARE NOT PREVENTED
PAIN_FUNCTIONAL_ASSESSMENT: 0-10
PAIN_FUNCTIONAL_ASSESSMENT: ACTIVITIES ARE NOT PREVENTED

## 2025-08-12 ASSESSMENT — PAIN SCALES - GENERAL
PAINLEVEL_OUTOF10: 7
PAINLEVEL_OUTOF10: 0
PAINLEVEL_OUTOF10: 5
PAINLEVEL_OUTOF10: 4
PAINLEVEL_OUTOF10: 0
PAINLEVEL_OUTOF10: 8
PAINLEVEL_OUTOF10: 7
PAINLEVEL_OUTOF10: 7

## 2025-08-12 ASSESSMENT — PAIN DESCRIPTION - PAIN TYPE: TYPE: ACUTE PAIN

## 2025-08-12 ASSESSMENT — PAIN DESCRIPTION - ONSET: ONSET: ON-GOING

## 2025-08-12 ASSESSMENT — PAIN SCALES - WONG BAKER: WONGBAKER_NUMERICALRESPONSE: HURTS EVEN MORE

## 2025-08-12 ASSESSMENT — PAIN DESCRIPTION - FREQUENCY: FREQUENCY: INTERMITTENT

## 2025-08-13 LAB
ALBUMIN SERPL-MCNC: 4 G/DL (ref 3.4–5)
ALBUMIN/GLOB SERPL: 1.2 {RATIO} (ref 1.1–2.2)
ALP SERPL-CCNC: 80 U/L (ref 40–129)
ALT SERPL-CCNC: 7 U/L (ref 10–40)
ANION GAP SERPL CALCULATED.3IONS-SCNC: 19 MMOL/L (ref 9–17)
AST SERPL-CCNC: 13 U/L (ref 15–37)
BASOPHILS # BLD: 0.04 K/UL
BASOPHILS NFR BLD: 1 % (ref 0–1)
BILIRUB SERPL-MCNC: 0.5 MG/DL (ref 0–1)
BUN SERPL-MCNC: 80 MG/DL (ref 7–20)
CALCIUM SERPL-MCNC: 8.3 MG/DL (ref 8.3–10.6)
CHLORIDE SERPL-SCNC: 94 MMOL/L (ref 99–110)
CO2 SERPL-SCNC: 24 MMOL/L (ref 21–32)
CREAT SERPL-MCNC: 9.1 MG/DL (ref 0.9–1.3)
EOSINOPHIL # BLD: 0.19 K/UL
EOSINOPHILS RELATIVE PERCENT: 3 % (ref 0–3)
ERYTHROCYTE [DISTWIDTH] IN BLOOD BY AUTOMATED COUNT: 15.8 % (ref 11.7–14.9)
GFR, ESTIMATED: 6 ML/MIN/1.73M2
GLUCOSE BLD-MCNC: 129 MG/DL (ref 74–99)
GLUCOSE BLD-MCNC: 148 MG/DL (ref 74–99)
GLUCOSE BLD-MCNC: 158 MG/DL (ref 74–99)
GLUCOSE BLD-MCNC: 189 MG/DL (ref 74–99)
GLUCOSE SERPL-MCNC: 133 MG/DL (ref 74–99)
HCT VFR BLD AUTO: 31.6 % (ref 42–52)
HGB BLD-MCNC: 9.8 G/DL (ref 13.5–18)
IMM GRANULOCYTES # BLD AUTO: 0.04 K/UL
IMM GRANULOCYTES NFR BLD: 1 %
LYMPHOCYTES NFR BLD: 0.43 K/UL
LYMPHOCYTES RELATIVE PERCENT: 6 % (ref 24–44)
MCH RBC QN AUTO: 29.9 PG (ref 27–31)
MCHC RBC AUTO-ENTMCNC: 31 G/DL (ref 32–36)
MCV RBC AUTO: 96.3 FL (ref 78–100)
MONOCYTES NFR BLD: 0.66 K/UL
MONOCYTES NFR BLD: 9 % (ref 0–5)
NEUTROPHILS NFR BLD: 82 % (ref 36–66)
NEUTS SEG NFR BLD: 6.18 K/UL
PHOSPHATE SERPL-MCNC: 10 MG/DL (ref 2.5–4.9)
PLATELET # BLD AUTO: 141 K/UL (ref 140–440)
PMV BLD AUTO: 9.8 FL (ref 7.5–11.1)
POTASSIUM SERPL-SCNC: 5.3 MMOL/L (ref 3.5–5.1)
PROT SERPL-MCNC: 7.3 G/DL (ref 6.4–8.2)
RBC # BLD AUTO: 3.28 M/UL (ref 4.6–6.2)
SODIUM SERPL-SCNC: 137 MMOL/L (ref 136–145)
WBC OTHER # BLD: 7.5 K/UL (ref 4–10.5)

## 2025-08-13 PROCEDURE — 94640 AIRWAY INHALATION TREATMENT: CPT

## 2025-08-13 PROCEDURE — 82962 GLUCOSE BLOOD TEST: CPT

## 2025-08-13 PROCEDURE — 6360000002 HC RX W HCPCS: Performed by: PHYSICIAN ASSISTANT

## 2025-08-13 PROCEDURE — 6360000002 HC RX W HCPCS: Performed by: STUDENT IN AN ORGANIZED HEALTH CARE EDUCATION/TRAINING PROGRAM

## 2025-08-13 PROCEDURE — 80053 COMPREHEN METABOLIC PANEL: CPT

## 2025-08-13 PROCEDURE — 94761 N-INVAS EAR/PLS OXIMETRY MLT: CPT

## 2025-08-13 PROCEDURE — 6370000000 HC RX 637 (ALT 250 FOR IP): Performed by: STUDENT IN AN ORGANIZED HEALTH CARE EDUCATION/TRAINING PROGRAM

## 2025-08-13 PROCEDURE — 94664 DEMO&/EVAL PT USE INHALER: CPT

## 2025-08-13 PROCEDURE — 90935 HEMODIALYSIS ONE EVALUATION: CPT

## 2025-08-13 PROCEDURE — 36415 COLL VENOUS BLD VENIPUNCTURE: CPT

## 2025-08-13 PROCEDURE — 84100 ASSAY OF PHOSPHORUS: CPT

## 2025-08-13 PROCEDURE — 6370000000 HC RX 637 (ALT 250 FOR IP)

## 2025-08-13 PROCEDURE — 85025 COMPLETE CBC W/AUTO DIFF WBC: CPT

## 2025-08-13 PROCEDURE — 1200000000 HC SEMI PRIVATE

## 2025-08-13 PROCEDURE — 2700000000 HC OXYGEN THERAPY PER DAY

## 2025-08-13 PROCEDURE — 6370000000 HC RX 637 (ALT 250 FOR IP): Performed by: PHYSICIAN ASSISTANT

## 2025-08-13 RX ORDER — BUPIVACAINE HYDROCHLORIDE 2.5 MG/ML
30 INJECTION, SOLUTION EPIDURAL; INFILTRATION; INTRACAUDAL; PERINEURAL ONCE
Status: DISCONTINUED | OUTPATIENT
Start: 2025-08-13 | End: 2025-08-15 | Stop reason: HOSPADM

## 2025-08-13 RX ORDER — DEXAMETHASONE SODIUM PHOSPHATE 4 MG/ML
4 INJECTION, SOLUTION INTRA-ARTICULAR; INTRALESIONAL; INTRAMUSCULAR; INTRAVENOUS; SOFT TISSUE ONCE
Status: DISCONTINUED | OUTPATIENT
Start: 2025-08-13 | End: 2025-08-15 | Stop reason: HOSPADM

## 2025-08-13 RX ORDER — TRIAMCINOLONE ACETONIDE 40 MG/ML
40 INJECTION, SUSPENSION INTRA-ARTICULAR; INTRAMUSCULAR ONCE
Status: DISCONTINUED | OUTPATIENT
Start: 2025-08-13 | End: 2025-08-15 | Stop reason: HOSPADM

## 2025-08-13 RX ADMIN — ROPINIROLE HYDROCHLORIDE 0.25 MG: 0.25 TABLET, FILM COATED ORAL at 21:02

## 2025-08-13 RX ADMIN — SUCRALFATE 1 G: 1 TABLET ORAL at 05:07

## 2025-08-13 RX ADMIN — ASPIRIN 81 MG: 81 TABLET, CHEWABLE ORAL at 09:04

## 2025-08-13 RX ADMIN — HYDROMORPHONE HYDROCHLORIDE 0.25 MG: 1 INJECTION, SOLUTION INTRAMUSCULAR; INTRAVENOUS; SUBCUTANEOUS at 21:04

## 2025-08-13 RX ADMIN — HEPARIN SODIUM 5000 UNITS: 5000 INJECTION INTRAVENOUS; SUBCUTANEOUS at 20:58

## 2025-08-13 RX ADMIN — ATORVASTATIN CALCIUM 40 MG: 40 TABLET, FILM COATED ORAL at 21:02

## 2025-08-13 RX ADMIN — HYDROMORPHONE HYDROCHLORIDE 0.5 MG: 1 INJECTION, SOLUTION INTRAMUSCULAR; INTRAVENOUS; SUBCUTANEOUS at 00:30

## 2025-08-13 RX ADMIN — SUCRALFATE 1 G: 1 TABLET ORAL at 21:03

## 2025-08-13 RX ADMIN — OXYCODONE HYDROCHLORIDE AND ACETAMINOPHEN 1 TABLET: 7.5; 325 TABLET ORAL at 05:07

## 2025-08-13 RX ADMIN — HYDRALAZINE HYDROCHLORIDE 25 MG: 25 TABLET ORAL at 09:04

## 2025-08-13 RX ADMIN — HYDROMORPHONE HYDROCHLORIDE 0.5 MG: 1 INJECTION, SOLUTION INTRAMUSCULAR; INTRAVENOUS; SUBCUTANEOUS at 09:33

## 2025-08-13 RX ADMIN — METOPROLOL TARTRATE 12.5 MG: 25 TABLET, FILM COATED ORAL at 09:04

## 2025-08-13 RX ADMIN — ISOSORBIDE MONONITRATE 30 MG: 30 TABLET, EXTENDED RELEASE ORAL at 09:04

## 2025-08-13 RX ADMIN — SUCRALFATE 1 G: 1 TABLET ORAL at 16:28

## 2025-08-13 RX ADMIN — PANTOPRAZOLE SODIUM 40 MG: 40 INJECTION, POWDER, FOR SOLUTION INTRAVENOUS at 20:59

## 2025-08-13 RX ADMIN — PANTOPRAZOLE SODIUM 40 MG: 40 INJECTION, POWDER, FOR SOLUTION INTRAVENOUS at 09:04

## 2025-08-13 RX ADMIN — BUDESONIDE AND FORMOTEROL FUMARATE DIHYDRATE 2 PUFF: 160; 4.5 AEROSOL RESPIRATORY (INHALATION) at 20:48

## 2025-08-13 RX ADMIN — BUDESONIDE AND FORMOTEROL FUMARATE DIHYDRATE 2 PUFF: 160; 4.5 AEROSOL RESPIRATORY (INHALATION) at 11:23

## 2025-08-13 RX ADMIN — HEPARIN SODIUM 5000 UNITS: 5000 INJECTION INTRAVENOUS; SUBCUTANEOUS at 14:00

## 2025-08-13 RX ADMIN — CALCITRIOL CAPSULES 0.25 MCG 0.5 MCG: 0.25 CAPSULE ORAL at 21:03

## 2025-08-13 RX ADMIN — CALCITRIOL CAPSULES 0.25 MCG 0.5 MCG: 0.25 CAPSULE ORAL at 09:04

## 2025-08-13 RX ADMIN — TRAZODONE HYDROCHLORIDE 150 MG: 50 TABLET ORAL at 21:02

## 2025-08-13 RX ADMIN — OXYCODONE HYDROCHLORIDE AND ACETAMINOPHEN 1 TABLET: 7.5; 325 TABLET ORAL at 16:28

## 2025-08-13 RX ADMIN — HYDRALAZINE HYDROCHLORIDE 25 MG: 25 TABLET ORAL at 20:59

## 2025-08-13 RX ADMIN — CLOPIDOGREL BISULFATE 75 MG: 75 TABLET, FILM COATED ORAL at 09:04

## 2025-08-13 RX ADMIN — HEPARIN SODIUM 5000 UNITS: 5000 INJECTION INTRAVENOUS; SUBCUTANEOUS at 05:08

## 2025-08-13 RX ADMIN — METOPROLOL TARTRATE 12.5 MG: 25 TABLET, FILM COATED ORAL at 21:03

## 2025-08-13 ASSESSMENT — PAIN DESCRIPTION - ONSET
ONSET: ON-GOING
ONSET: ON-GOING

## 2025-08-13 ASSESSMENT — PAIN DESCRIPTION - ORIENTATION
ORIENTATION: RIGHT

## 2025-08-13 ASSESSMENT — PAIN DESCRIPTION - PAIN TYPE
TYPE: ACUTE PAIN
TYPE: ACUTE PAIN

## 2025-08-13 ASSESSMENT — PAIN - FUNCTIONAL ASSESSMENT
PAIN_FUNCTIONAL_ASSESSMENT: 0-10
PAIN_FUNCTIONAL_ASSESSMENT: ACTIVITIES ARE NOT PREVENTED

## 2025-08-13 ASSESSMENT — PAIN DESCRIPTION - LOCATION
LOCATION: KNEE;LEG
LOCATION: BACK
LOCATION: LEG
LOCATION: KNEE

## 2025-08-13 ASSESSMENT — PAIN DESCRIPTION - FREQUENCY
FREQUENCY: INTERMITTENT
FREQUENCY: INTERMITTENT

## 2025-08-13 ASSESSMENT — PAIN DESCRIPTION - DESCRIPTORS
DESCRIPTORS: DISCOMFORT
DESCRIPTORS: ACHING
DESCRIPTORS: SHARP

## 2025-08-13 ASSESSMENT — PAIN SCALES - GENERAL
PAINLEVEL_OUTOF10: 7
PAINLEVEL_OUTOF10: 5
PAINLEVEL_OUTOF10: 8
PAINLEVEL_OUTOF10: 3
PAINLEVEL_OUTOF10: 10

## 2025-08-13 ASSESSMENT — PAIN DESCRIPTION - DIRECTION: RADIATING_TOWARDS: NO

## 2025-08-14 ENCOUNTER — ANESTHESIA EVENT (OUTPATIENT)
Dept: ENDOSCOPY | Age: 50
End: 2025-08-14
Payer: MEDICARE

## 2025-08-14 ENCOUNTER — ANESTHESIA (OUTPATIENT)
Dept: ENDOSCOPY | Age: 50
End: 2025-08-14
Payer: MEDICARE

## 2025-08-14 PROBLEM — R07.9 CHEST PAIN: Status: RESOLVED | Noted: 2021-12-21 | Resolved: 2025-08-14

## 2025-08-14 LAB
ALBUMIN SERPL-MCNC: 3.6 G/DL (ref 3.4–5)
ALBUMIN/GLOB SERPL: 1.1 {RATIO} (ref 1.1–2.2)
ALP SERPL-CCNC: 83 U/L (ref 40–129)
ALT SERPL-CCNC: 11 U/L (ref 10–40)
ANION GAP SERPL CALCULATED.3IONS-SCNC: 15 MMOL/L (ref 9–17)
AST SERPL-CCNC: 15 U/L (ref 15–37)
BASOPHILS # BLD: 0.03 K/UL
BASOPHILS NFR BLD: 0 % (ref 0–1)
BILIRUB SERPL-MCNC: 0.5 MG/DL (ref 0–1)
BUN SERPL-MCNC: 50 MG/DL (ref 7–20)
CALCIUM SERPL-MCNC: 8.3 MG/DL (ref 8.3–10.6)
CHLORIDE SERPL-SCNC: 99 MMOL/L (ref 99–110)
CO2 SERPL-SCNC: 24 MMOL/L (ref 21–32)
CREAT SERPL-MCNC: 6.9 MG/DL (ref 0.9–1.3)
EOSINOPHIL # BLD: 0.13 K/UL
EOSINOPHILS RELATIVE PERCENT: 2 % (ref 0–3)
ERYTHROCYTE [DISTWIDTH] IN BLOOD BY AUTOMATED COUNT: 15.7 % (ref 11.7–14.9)
GFR, ESTIMATED: 9 ML/MIN/1.73M2
GLUCOSE BLD-MCNC: 127 MG/DL (ref 74–99)
GLUCOSE BLD-MCNC: 128 MG/DL (ref 74–99)
GLUCOSE BLD-MCNC: 159 MG/DL (ref 74–99)
GLUCOSE BLD-MCNC: 267 MG/DL (ref 74–99)
GLUCOSE SERPL-MCNC: 165 MG/DL (ref 74–99)
HCT VFR BLD AUTO: 28.3 % (ref 42–52)
HGB BLD-MCNC: 8.8 G/DL (ref 13.5–18)
IMM GRANULOCYTES # BLD AUTO: 0.01 K/UL
IMM GRANULOCYTES NFR BLD: 0 %
LYMPHOCYTES NFR BLD: 0.35 K/UL
LYMPHOCYTES RELATIVE PERCENT: 5 % (ref 24–44)
MCH RBC QN AUTO: 29.8 PG (ref 27–31)
MCHC RBC AUTO-ENTMCNC: 31.1 G/DL (ref 32–36)
MCV RBC AUTO: 95.9 FL (ref 78–100)
MONOCYTES NFR BLD: 0.52 K/UL
MONOCYTES NFR BLD: 8 % (ref 0–5)
NEUTROPHILS NFR BLD: 85 % (ref 36–66)
NEUTS SEG NFR BLD: 5.67 K/UL
PHOSPHATE SERPL-MCNC: 6.5 MG/DL (ref 2.5–4.9)
PLATELET # BLD AUTO: 126 K/UL (ref 140–440)
PMV BLD AUTO: 10.6 FL (ref 7.5–11.1)
POTASSIUM SERPL-SCNC: 4.4 MMOL/L (ref 3.5–5.1)
PROT SERPL-MCNC: 6.8 G/DL (ref 6.4–8.2)
RBC # BLD AUTO: 2.95 M/UL (ref 4.6–6.2)
SODIUM SERPL-SCNC: 137 MMOL/L (ref 136–145)
WBC OTHER # BLD: 6.7 K/UL (ref 4–10.5)

## 2025-08-14 PROCEDURE — 2580000003 HC RX 258: Performed by: ANESTHESIOLOGY

## 2025-08-14 PROCEDURE — 94761 N-INVAS EAR/PLS OXIMETRY MLT: CPT

## 2025-08-14 PROCEDURE — 2709999900 HC NON-CHARGEABLE SUPPLY: Performed by: INTERNAL MEDICINE

## 2025-08-14 PROCEDURE — 6360000002 HC RX W HCPCS: Performed by: STUDENT IN AN ORGANIZED HEALTH CARE EDUCATION/TRAINING PROGRAM

## 2025-08-14 PROCEDURE — 6360000002 HC RX W HCPCS: Performed by: PHYSICIAN ASSISTANT

## 2025-08-14 PROCEDURE — 6370000000 HC RX 637 (ALT 250 FOR IP): Performed by: STUDENT IN AN ORGANIZED HEALTH CARE EDUCATION/TRAINING PROGRAM

## 2025-08-14 PROCEDURE — 84100 ASSAY OF PHOSPHORUS: CPT

## 2025-08-14 PROCEDURE — 6370000000 HC RX 637 (ALT 250 FOR IP): Performed by: INTERNAL MEDICINE

## 2025-08-14 PROCEDURE — 6370000000 HC RX 637 (ALT 250 FOR IP)

## 2025-08-14 PROCEDURE — 94640 AIRWAY INHALATION TREATMENT: CPT

## 2025-08-14 PROCEDURE — 6360000002 HC RX W HCPCS

## 2025-08-14 PROCEDURE — 2580000003 HC RX 258

## 2025-08-14 PROCEDURE — 3700000000 HC ANESTHESIA ATTENDED CARE: Performed by: INTERNAL MEDICINE

## 2025-08-14 PROCEDURE — 1200000000 HC SEMI PRIVATE

## 2025-08-14 PROCEDURE — 2700000000 HC OXYGEN THERAPY PER DAY

## 2025-08-14 PROCEDURE — 3609017100 HC EGD: Performed by: INTERNAL MEDICINE

## 2025-08-14 PROCEDURE — 3700000001 HC ADD 15 MINUTES (ANESTHESIA): Performed by: INTERNAL MEDICINE

## 2025-08-14 PROCEDURE — 82962 GLUCOSE BLOOD TEST: CPT

## 2025-08-14 PROCEDURE — 80053 COMPREHEN METABOLIC PANEL: CPT

## 2025-08-14 PROCEDURE — 0DJ08ZZ INSPECTION OF UPPER INTESTINAL TRACT, VIA NATURAL OR ARTIFICIAL OPENING ENDOSCOPIC: ICD-10-PCS | Performed by: INTERNAL MEDICINE

## 2025-08-14 PROCEDURE — 6370000000 HC RX 637 (ALT 250 FOR IP): Performed by: PHYSICIAN ASSISTANT

## 2025-08-14 PROCEDURE — 36415 COLL VENOUS BLD VENIPUNCTURE: CPT

## 2025-08-14 PROCEDURE — 85025 COMPLETE CBC W/AUTO DIFF WBC: CPT

## 2025-08-14 RX ORDER — SEVELAMER CARBONATE 800 MG/1
800 TABLET, FILM COATED ORAL
Qty: 90 TABLET | Refills: 3 | Status: SHIPPED | OUTPATIENT
Start: 2025-08-14

## 2025-08-14 RX ORDER — INSULIN LISPRO 100 [IU]/ML
0-4 INJECTION, SOLUTION INTRAVENOUS; SUBCUTANEOUS
Status: DISCONTINUED | OUTPATIENT
Start: 2025-08-14 | End: 2025-08-15 | Stop reason: HOSPADM

## 2025-08-14 RX ORDER — LIDOCAINE HYDROCHLORIDE 20 MG/ML
INJECTION, SOLUTION EPIDURAL; INFILTRATION; INTRACAUDAL; PERINEURAL
Status: DISCONTINUED | OUTPATIENT
Start: 2025-08-14 | End: 2025-08-14 | Stop reason: SDUPTHER

## 2025-08-14 RX ORDER — PROPOFOL 10 MG/ML
INJECTION, EMULSION INTRAVENOUS
Status: DISCONTINUED | OUTPATIENT
Start: 2025-08-14 | End: 2025-08-14 | Stop reason: SDUPTHER

## 2025-08-14 RX ORDER — SEVELAMER CARBONATE 800 MG/1
800 TABLET, FILM COATED ORAL
Status: DISCONTINUED | OUTPATIENT
Start: 2025-08-14 | End: 2025-08-15 | Stop reason: HOSPADM

## 2025-08-14 RX ORDER — METOCLOPRAMIDE 10 MG/1
10 TABLET ORAL
Qty: 120 TABLET | Refills: 1 | Status: SHIPPED | OUTPATIENT
Start: 2025-08-14

## 2025-08-14 RX ORDER — ONDANSETRON 4 MG/1
4 TABLET, FILM COATED ORAL 3 TIMES DAILY PRN
Qty: 60 TABLET | Refills: 0 | Status: SHIPPED | OUTPATIENT
Start: 2025-08-14

## 2025-08-14 RX ORDER — SODIUM CHLORIDE, SODIUM LACTATE, POTASSIUM CHLORIDE, CALCIUM CHLORIDE 600; 310; 30; 20 MG/100ML; MG/100ML; MG/100ML; MG/100ML
INJECTION, SOLUTION INTRAVENOUS
Status: DISCONTINUED | OUTPATIENT
Start: 2025-08-14 | End: 2025-08-14 | Stop reason: SDUPTHER

## 2025-08-14 RX ORDER — FLUCONAZOLE 100 MG/1
100 TABLET ORAL DAILY
Status: DISCONTINUED | OUTPATIENT
Start: 2025-08-15 | End: 2025-08-15 | Stop reason: HOSPADM

## 2025-08-14 RX ORDER — SODIUM CHLORIDE 9 MG/ML
INJECTION, SOLUTION INTRAVENOUS CONTINUOUS
Status: DISCONTINUED | OUTPATIENT
Start: 2025-08-14 | End: 2025-08-14 | Stop reason: HOSPADM

## 2025-08-14 RX ORDER — FLUCONAZOLE 150 MG/1
150 TABLET ORAL DAILY
Qty: 10 TABLET | Refills: 0 | Status: SHIPPED | OUTPATIENT
Start: 2025-08-14 | End: 2025-08-24

## 2025-08-14 RX ADMIN — PROPOFOL 50 MG: 10 INJECTION, EMULSION INTRAVENOUS at 13:50

## 2025-08-14 RX ADMIN — SEVELAMER CARBONATE 800 MG: 800 TABLET, FILM COATED ORAL at 14:41

## 2025-08-14 RX ADMIN — HYDRALAZINE HYDROCHLORIDE 25 MG: 25 TABLET ORAL at 14:41

## 2025-08-14 RX ADMIN — OXYCODONE HYDROCHLORIDE AND ACETAMINOPHEN 1 TABLET: 7.5; 325 TABLET ORAL at 01:07

## 2025-08-14 RX ADMIN — METOPROLOL TARTRATE 12.5 MG: 25 TABLET, FILM COATED ORAL at 20:27

## 2025-08-14 RX ADMIN — SEVELAMER CARBONATE 800 MG: 800 TABLET, FILM COATED ORAL at 17:44

## 2025-08-14 RX ADMIN — CALCITRIOL CAPSULES 0.25 MCG 0.5 MCG: 0.25 CAPSULE ORAL at 14:41

## 2025-08-14 RX ADMIN — PROPOFOL 20 MG: 10 INJECTION, EMULSION INTRAVENOUS at 13:54

## 2025-08-14 RX ADMIN — ASPIRIN 81 MG: 81 TABLET, CHEWABLE ORAL at 14:40

## 2025-08-14 RX ADMIN — HYDROMORPHONE HYDROCHLORIDE 0.25 MG: 1 INJECTION, SOLUTION INTRAMUSCULAR; INTRAVENOUS; SUBCUTANEOUS at 20:26

## 2025-08-14 RX ADMIN — HEPARIN SODIUM 5000 UNITS: 5000 INJECTION INTRAVENOUS; SUBCUTANEOUS at 14:40

## 2025-08-14 RX ADMIN — ATORVASTATIN CALCIUM 40 MG: 40 TABLET, FILM COATED ORAL at 20:27

## 2025-08-14 RX ADMIN — SODIUM CHLORIDE, POTASSIUM CHLORIDE, SODIUM LACTATE AND CALCIUM CHLORIDE: 600; 310; 30; 20 INJECTION, SOLUTION INTRAVENOUS at 13:44

## 2025-08-14 RX ADMIN — CALCITRIOL CAPSULES 0.25 MCG 0.5 MCG: 0.25 CAPSULE ORAL at 20:27

## 2025-08-14 RX ADMIN — ISOSORBIDE MONONITRATE 30 MG: 30 TABLET, EXTENDED RELEASE ORAL at 14:42

## 2025-08-14 RX ADMIN — HYDROMORPHONE HYDROCHLORIDE 0.25 MG: 1 INJECTION, SOLUTION INTRAMUSCULAR; INTRAVENOUS; SUBCUTANEOUS at 10:27

## 2025-08-14 RX ADMIN — PROPOFOL 20 MG: 10 INJECTION, EMULSION INTRAVENOUS at 13:52

## 2025-08-14 RX ADMIN — HEPARIN SODIUM 5000 UNITS: 5000 INJECTION INTRAVENOUS; SUBCUTANEOUS at 20:25

## 2025-08-14 RX ADMIN — HYDRALAZINE HYDROCHLORIDE 25 MG: 25 TABLET ORAL at 20:27

## 2025-08-14 RX ADMIN — OXYCODONE HYDROCHLORIDE AND ACETAMINOPHEN 1 TABLET: 7.5; 325 TABLET ORAL at 23:04

## 2025-08-14 RX ADMIN — SUCRALFATE 1 G: 1 TABLET ORAL at 17:44

## 2025-08-14 RX ADMIN — BUDESONIDE AND FORMOTEROL FUMARATE DIHYDRATE 2 PUFF: 160; 4.5 AEROSOL RESPIRATORY (INHALATION) at 20:26

## 2025-08-14 RX ADMIN — HYDROMORPHONE HYDROCHLORIDE 0.25 MG: 1 INJECTION, SOLUTION INTRAMUSCULAR; INTRAVENOUS; SUBCUTANEOUS at 04:28

## 2025-08-14 RX ADMIN — LIDOCAINE HYDROCHLORIDE 100 MG: 20 INJECTION, SOLUTION EPIDURAL; INFILTRATION; INTRACAUDAL; PERINEURAL at 13:50

## 2025-08-14 RX ADMIN — SUCRALFATE 1 G: 1 TABLET ORAL at 20:27

## 2025-08-14 RX ADMIN — OXYCODONE HYDROCHLORIDE AND ACETAMINOPHEN 1 TABLET: 7.5; 325 TABLET ORAL at 14:42

## 2025-08-14 RX ADMIN — HEPARIN SODIUM 5000 UNITS: 5000 INJECTION INTRAVENOUS; SUBCUTANEOUS at 05:36

## 2025-08-14 RX ADMIN — PANTOPRAZOLE SODIUM 40 MG: 40 INJECTION, POWDER, FOR SOLUTION INTRAVENOUS at 20:26

## 2025-08-14 RX ADMIN — CLOPIDOGREL BISULFATE 75 MG: 75 TABLET, FILM COATED ORAL at 14:42

## 2025-08-14 RX ADMIN — TRAZODONE HYDROCHLORIDE 150 MG: 50 TABLET ORAL at 20:27

## 2025-08-14 RX ADMIN — ROPINIROLE HYDROCHLORIDE 0.25 MG: 0.25 TABLET, FILM COATED ORAL at 20:27

## 2025-08-14 RX ADMIN — PANTOPRAZOLE SODIUM 40 MG: 40 INJECTION, POWDER, FOR SOLUTION INTRAVENOUS at 10:35

## 2025-08-14 RX ADMIN — BUDESONIDE AND FORMOTEROL FUMARATE DIHYDRATE 2 PUFF: 160; 4.5 AEROSOL RESPIRATORY (INHALATION) at 07:48

## 2025-08-14 RX ADMIN — SODIUM CHLORIDE: 0.9 INJECTION, SOLUTION INTRAVENOUS at 13:28

## 2025-08-14 RX ADMIN — METOPROLOL TARTRATE 12.5 MG: 25 TABLET, FILM COATED ORAL at 14:42

## 2025-08-14 ASSESSMENT — PAIN SCALES - GENERAL
PAINLEVEL_OUTOF10: 7
PAINLEVEL_OUTOF10: 5
PAINLEVEL_OUTOF10: 3
PAINLEVEL_OUTOF10: 7
PAINLEVEL_OUTOF10: 5
PAINLEVEL_OUTOF10: 6
PAINLEVEL_OUTOF10: 8
PAINLEVEL_OUTOF10: 6
PAINLEVEL_OUTOF10: 4
PAINLEVEL_OUTOF10: 9
PAINLEVEL_OUTOF10: 7
PAINLEVEL_OUTOF10: 7
PAINLEVEL_OUTOF10: 5

## 2025-08-14 ASSESSMENT — PAIN - FUNCTIONAL ASSESSMENT
PAIN_FUNCTIONAL_ASSESSMENT: ACTIVITIES ARE NOT PREVENTED
PAIN_FUNCTIONAL_ASSESSMENT: ACTIVITIES ARE NOT PREVENTED
PAIN_FUNCTIONAL_ASSESSMENT: 0-10
PAIN_FUNCTIONAL_ASSESSMENT: PREVENTS OR INTERFERES SOME ACTIVE ACTIVITIES AND ADLS
PAIN_FUNCTIONAL_ASSESSMENT: 0-10
PAIN_FUNCTIONAL_ASSESSMENT: ACTIVITIES ARE NOT PREVENTED

## 2025-08-14 ASSESSMENT — PAIN DESCRIPTION - LOCATION
LOCATION: BACK;KNEE
LOCATION: BACK
LOCATION: KNEE;BACK
LOCATION: BACK;KNEE
LOCATION: KNEE;BACK
LOCATION: KNEE;BACK

## 2025-08-14 ASSESSMENT — PAIN DESCRIPTION - PAIN TYPE
TYPE: ACUTE PAIN;CHRONIC PAIN
TYPE: ACUTE PAIN
TYPE: ACUTE PAIN;CHRONIC PAIN
TYPE: ACUTE PAIN
TYPE: CHRONIC PAIN

## 2025-08-14 ASSESSMENT — PAIN DESCRIPTION - DESCRIPTORS
DESCRIPTORS: THROBBING
DESCRIPTORS: THROBBING;STABBING
DESCRIPTORS: SHARP;THROBBING
DESCRIPTORS: SHARP;THROBBING
DESCRIPTORS: THROBBING;STABBING
DESCRIPTORS: STABBING;THROBBING
DESCRIPTORS: ACHING

## 2025-08-14 ASSESSMENT — PAIN DESCRIPTION - FREQUENCY
FREQUENCY: INTERMITTENT
FREQUENCY: CONTINUOUS

## 2025-08-14 ASSESSMENT — PAIN DESCRIPTION - ONSET
ONSET: ON-GOING

## 2025-08-14 ASSESSMENT — PAIN DESCRIPTION - ORIENTATION
ORIENTATION: LEFT;LOWER
ORIENTATION: RIGHT;LOWER
ORIENTATION: RIGHT

## 2025-08-14 ASSESSMENT — PAIN DESCRIPTION - DIRECTION
RADIATING_TOWARDS: NO
RADIATING_TOWARDS: NO

## 2025-08-15 VITALS
TEMPERATURE: 98 F | SYSTOLIC BLOOD PRESSURE: 135 MMHG | OXYGEN SATURATION: 97 % | RESPIRATION RATE: 20 BRPM | HEART RATE: 71 BPM | HEIGHT: 69 IN | BODY MASS INDEX: 38.92 KG/M2 | DIASTOLIC BLOOD PRESSURE: 72 MMHG | WEIGHT: 262.79 LBS

## 2025-08-15 LAB
ANION GAP SERPL CALCULATED.3IONS-SCNC: 22 MMOL/L (ref 9–17)
BUN SERPL-MCNC: 74 MG/DL (ref 7–20)
CALCIUM SERPL-MCNC: 8.5 MG/DL (ref 8.3–10.6)
CHLORIDE SERPL-SCNC: 95 MMOL/L (ref 99–110)
CO2 SERPL-SCNC: 17 MMOL/L (ref 21–32)
CREAT SERPL-MCNC: 8.6 MG/DL (ref 0.9–1.3)
ERYTHROCYTE [DISTWIDTH] IN BLOOD BY AUTOMATED COUNT: 15.3 % (ref 11.7–14.9)
GFR, ESTIMATED: 7 ML/MIN/1.73M2
GLUCOSE BLD-MCNC: 190 MG/DL (ref 74–99)
GLUCOSE BLD-MCNC: 194 MG/DL (ref 74–99)
GLUCOSE SERPL-MCNC: 171 MG/DL (ref 74–99)
HCT VFR BLD AUTO: 29.8 % (ref 42–52)
HGB BLD-MCNC: 9.2 G/DL (ref 13.5–18)
MCH RBC QN AUTO: 30 PG (ref 27–31)
MCHC RBC AUTO-ENTMCNC: 30.9 G/DL (ref 32–36)
MCV RBC AUTO: 97.1 FL (ref 78–100)
PHOSPHATE SERPL-MCNC: 8.1 MG/DL (ref 2.5–4.9)
PLATELET # BLD AUTO: 129 K/UL (ref 140–440)
PMV BLD AUTO: 11.1 FL (ref 7.5–11.1)
POTASSIUM SERPL-SCNC: 5.1 MMOL/L (ref 3.5–5.1)
RBC # BLD AUTO: 3.07 M/UL (ref 4.6–6.2)
SODIUM SERPL-SCNC: 134 MMOL/L (ref 136–145)
WBC OTHER # BLD: 7.3 K/UL (ref 4–10.5)

## 2025-08-15 PROCEDURE — 6360000002 HC RX W HCPCS: Performed by: INTERNAL MEDICINE

## 2025-08-15 PROCEDURE — 6370000000 HC RX 637 (ALT 250 FOR IP): Performed by: INTERNAL MEDICINE

## 2025-08-15 PROCEDURE — 84100 ASSAY OF PHOSPHORUS: CPT

## 2025-08-15 PROCEDURE — 85027 COMPLETE CBC AUTOMATED: CPT

## 2025-08-15 PROCEDURE — 80048 BASIC METABOLIC PNL TOTAL CA: CPT

## 2025-08-15 PROCEDURE — 82962 GLUCOSE BLOOD TEST: CPT

## 2025-08-15 PROCEDURE — 2700000000 HC OXYGEN THERAPY PER DAY

## 2025-08-15 PROCEDURE — 94640 AIRWAY INHALATION TREATMENT: CPT

## 2025-08-15 PROCEDURE — 90935 HEMODIALYSIS ONE EVALUATION: CPT

## 2025-08-15 PROCEDURE — 94761 N-INVAS EAR/PLS OXIMETRY MLT: CPT

## 2025-08-15 PROCEDURE — 36415 COLL VENOUS BLD VENIPUNCTURE: CPT

## 2025-08-15 RX ORDER — PROMETHAZINE HYDROCHLORIDE 25 MG/ML
12.5 INJECTION, SOLUTION INTRAMUSCULAR; INTRAVENOUS ONCE
Status: COMPLETED | OUTPATIENT
Start: 2025-08-15 | End: 2025-08-15

## 2025-08-15 RX ADMIN — FLUCONAZOLE 100 MG: 100 TABLET ORAL at 11:45

## 2025-08-15 RX ADMIN — CALCITRIOL CAPSULES 0.25 MCG 0.5 MCG: 0.25 CAPSULE ORAL at 11:45

## 2025-08-15 RX ADMIN — CLOPIDOGREL BISULFATE 75 MG: 75 TABLET, FILM COATED ORAL at 11:45

## 2025-08-15 RX ADMIN — HEPARIN SODIUM 5000 UNITS: 5000 INJECTION INTRAVENOUS; SUBCUTANEOUS at 15:14

## 2025-08-15 RX ADMIN — METOPROLOL TARTRATE 12.5 MG: 25 TABLET, FILM COATED ORAL at 11:45

## 2025-08-15 RX ADMIN — ASPIRIN 81 MG: 81 TABLET, CHEWABLE ORAL at 11:45

## 2025-08-15 RX ADMIN — ONDANSETRON 4 MG: 2 INJECTION INTRAMUSCULAR; INTRAVENOUS at 06:17

## 2025-08-15 RX ADMIN — SEVELAMER CARBONATE 800 MG: 800 TABLET, FILM COATED ORAL at 11:45

## 2025-08-15 RX ADMIN — PANTOPRAZOLE SODIUM 40 MG: 40 INJECTION, POWDER, FOR SOLUTION INTRAVENOUS at 11:45

## 2025-08-15 RX ADMIN — HEPARIN SODIUM 5000 UNITS: 5000 INJECTION INTRAVENOUS; SUBCUTANEOUS at 06:18

## 2025-08-15 RX ADMIN — INSULIN LISPRO 1 UNITS: 100 INJECTION, SOLUTION INTRAVENOUS; SUBCUTANEOUS at 06:18

## 2025-08-15 RX ADMIN — ISOSORBIDE MONONITRATE 30 MG: 30 TABLET, EXTENDED RELEASE ORAL at 11:45

## 2025-08-15 RX ADMIN — HYDRALAZINE HYDROCHLORIDE 25 MG: 25 TABLET ORAL at 11:45

## 2025-08-15 RX ADMIN — BUDESONIDE AND FORMOTEROL FUMARATE DIHYDRATE 2 PUFF: 160; 4.5 AEROSOL RESPIRATORY (INHALATION) at 07:06

## 2025-08-15 RX ADMIN — OXYCODONE HYDROCHLORIDE AND ACETAMINOPHEN 1 TABLET: 7.5; 325 TABLET ORAL at 11:45

## 2025-08-15 RX ADMIN — SUCRALFATE 1 G: 1 TABLET ORAL at 11:45

## 2025-08-15 RX ADMIN — PROMETHAZINE HYDROCHLORIDE 12.5 MG: 25 INJECTION INTRAMUSCULAR; INTRAVENOUS at 06:52

## 2025-08-15 RX ADMIN — INSULIN LISPRO 1 UNITS: 100 INJECTION, SOLUTION INTRAVENOUS; SUBCUTANEOUS at 11:46

## 2025-08-15 ASSESSMENT — PAIN SCALES - GENERAL
PAINLEVEL_OUTOF10: 3
PAINLEVEL_OUTOF10: 3
PAINLEVEL_OUTOF10: 4

## 2025-08-15 ASSESSMENT — PAIN DESCRIPTION - LOCATION
LOCATION: BACK
LOCATION: ABDOMEN

## 2025-08-15 ASSESSMENT — PAIN DESCRIPTION - ORIENTATION
ORIENTATION: LOWER
ORIENTATION: LOWER

## 2025-08-15 ASSESSMENT — PAIN DESCRIPTION - DESCRIPTORS: DESCRIPTORS: ACHING

## 2025-08-15 ASSESSMENT — PAIN - FUNCTIONAL ASSESSMENT: PAIN_FUNCTIONAL_ASSESSMENT: PREVENTS OR INTERFERES SOME ACTIVE ACTIVITIES AND ADLS

## 2025-08-25 ENCOUNTER — HOSPITAL ENCOUNTER (OUTPATIENT)
Dept: WOUND CARE | Age: 50
Discharge: HOME | End: 2025-08-25
Attending: STUDENT IN AN ORGANIZED HEALTH CARE EDUCATION/TRAINING PROGRAM

## 2025-08-27 ENCOUNTER — HOSPITAL ENCOUNTER (OUTPATIENT)
Dept: WOUND CARE | Age: 50
Discharge: HOME | End: 2025-08-27
Attending: STUDENT IN AN ORGANIZED HEALTH CARE EDUCATION/TRAINING PROGRAM
Payer: MEDICARE

## 2025-08-27 VITALS
TEMPERATURE: 97.8 F | DIASTOLIC BLOOD PRESSURE: 81 MMHG | HEART RATE: 72 BPM | RESPIRATION RATE: 18 BRPM | SYSTOLIC BLOOD PRESSURE: 141 MMHG

## 2025-08-27 DIAGNOSIS — L97.509 TYPE 2 DIABETES MELLITUS WITH FOOT ULCER, WITH LONG-TERM CURRENT USE OF INSULIN (HCC): ICD-10-CM

## 2025-08-27 DIAGNOSIS — N18.6 ESRD (END STAGE RENAL DISEASE) ON DIALYSIS (HCC): ICD-10-CM

## 2025-08-27 DIAGNOSIS — E11.621 DIABETIC ULCER OF RIGHT MIDFOOT ASSOCIATED WITH TYPE 2 DIABETES MELLITUS, WITH FAT LAYER EXPOSED (HCC): Primary | ICD-10-CM

## 2025-08-27 DIAGNOSIS — L97.412 DIABETIC ULCER OF RIGHT MIDFOOT ASSOCIATED WITH TYPE 2 DIABETES MELLITUS, WITH FAT LAYER EXPOSED (HCC): Primary | ICD-10-CM

## 2025-08-27 DIAGNOSIS — G89.4 CHRONIC PAIN SYNDROME: ICD-10-CM

## 2025-08-27 DIAGNOSIS — T21.22XD BURN OF SECOND DEGREE OF ABDOMINAL WALL, SUBSEQUENT ENCOUNTER: ICD-10-CM

## 2025-08-27 DIAGNOSIS — E11.622 DIABETIC ULCER OF ANKLE WITH FAT LAYER EXPOSED (HCC): ICD-10-CM

## 2025-08-27 DIAGNOSIS — E11.621 TYPE 2 DIABETES MELLITUS WITH FOOT ULCER, WITH LONG-TERM CURRENT USE OF INSULIN (HCC): ICD-10-CM

## 2025-08-27 DIAGNOSIS — L98.492 TRAUMATIC ULCER WITH FAT LAYER EXPOSED (HCC): ICD-10-CM

## 2025-08-27 DIAGNOSIS — Z79.4 TYPE 2 DIABETES MELLITUS WITH FOOT ULCER, WITH LONG-TERM CURRENT USE OF INSULIN (HCC): ICD-10-CM

## 2025-08-27 DIAGNOSIS — L97.812 SKIN ULCER OF RIGHT KNEE WITH FAT LAYER EXPOSED (HCC): ICD-10-CM

## 2025-08-27 DIAGNOSIS — L84 PRE-ULCERATIVE CALLUSES: ICD-10-CM

## 2025-08-27 DIAGNOSIS — L97.302 DIABETIC ULCER OF ANKLE WITH FAT LAYER EXPOSED (HCC): ICD-10-CM

## 2025-08-27 DIAGNOSIS — Z99.2 ESRD (END STAGE RENAL DISEASE) ON DIALYSIS (HCC): ICD-10-CM

## 2025-08-27 PROBLEM — L97.405 DIABETIC ULCER OF MIDFOOT ASSOCIATED WITH TYPE 2 DIABETES MELLITUS, WITH MUSCLE INVOLVEMENT WITHOUT EVIDENCE OF NECROSIS (HCC): Status: RESOLVED | Noted: 2024-05-31 | Resolved: 2025-08-27

## 2025-08-27 PROBLEM — M79.662 PAIN IN LEFT LOWER LEG: Status: RESOLVED | Noted: 2024-08-27 | Resolved: 2025-08-27

## 2025-08-27 PROBLEM — S91.302S: Status: RESOLVED | Noted: 2024-01-12 | Resolved: 2025-08-27

## 2025-08-27 PROBLEM — R11.2 INTRACTABLE NAUSEA AND VOMITING: Status: RESOLVED | Noted: 2019-01-11 | Resolved: 2025-08-27

## 2025-08-27 PROBLEM — S31.109A RIGHT GROIN WOUND: Status: RESOLVED | Noted: 2021-06-09 | Resolved: 2025-08-27

## 2025-08-27 PROBLEM — S91.302A OPEN WOUND OF PLANTAR ASPECT OF LEFT FOOT: Status: RESOLVED | Noted: 2023-10-17 | Resolved: 2025-08-27

## 2025-08-27 PROBLEM — R11.2 NAUSEA AND VOMITING: Status: RESOLVED | Noted: 2019-01-11 | Resolved: 2025-08-27

## 2025-08-27 PROBLEM — Z78.9 PROBLEM WITH VASCULAR ACCESS: Status: RESOLVED | Noted: 2022-11-26 | Resolved: 2025-08-27

## 2025-08-27 PROBLEM — A41.9 SEPSIS (HCC): Status: RESOLVED | Noted: 2018-09-15 | Resolved: 2025-08-27

## 2025-08-27 PROBLEM — N18.32 STAGE 3B CHRONIC KIDNEY DISEASE (HCC): Status: RESOLVED | Noted: 2021-09-22 | Resolved: 2025-08-27

## 2025-08-27 PROBLEM — L97.309 DIABETIC ULCER OF ANKLE (HCC): Status: ACTIVE | Noted: 2025-08-27

## 2025-08-27 PROCEDURE — 11042 DBRDMT SUBQ TIS 1ST 20SQCM/<: CPT

## 2025-08-27 PROCEDURE — 11042 DBRDMT SUBQ TIS 1ST 20SQCM/<: CPT | Performed by: NURSE PRACTITIONER

## 2025-08-27 PROCEDURE — 29581 APPL MULTLAYER CMPRN SYS LEG: CPT

## 2025-08-27 PROCEDURE — 16020 DRESS/DEBRID P-THICK BURN S: CPT | Performed by: NURSE PRACTITIONER

## 2025-08-27 PROCEDURE — 11056 PARNG/CUTG B9 HYPRKR LES 2-4: CPT

## 2025-08-27 PROCEDURE — 99213 OFFICE O/P EST LOW 20 MIN: CPT | Performed by: NURSE PRACTITIONER

## 2025-08-27 PROCEDURE — 6370000000 HC RX 637 (ALT 250 FOR IP): Performed by: NURSE PRACTITIONER

## 2025-08-27 RX ORDER — NEOMYCIN/BACITRACIN/POLYMYXINB 3.5-400-5K
OINTMENT (GRAM) TOPICAL PRN
OUTPATIENT
Start: 2025-08-27

## 2025-08-27 RX ORDER — NEOMYCIN/BACITRACIN/POLYMYXINB 3.5-400-5K
OINTMENT (GRAM) TOPICAL ONCE
Status: CANCELLED | OUTPATIENT
Start: 2025-08-27 | End: 2025-08-27

## 2025-08-27 RX ORDER — LIDOCAINE HYDROCHLORIDE 20 MG/ML
JELLY TOPICAL PRN
OUTPATIENT
Start: 2025-08-27

## 2025-08-27 RX ORDER — LIDOCAINE HYDROCHLORIDE 20 MG/ML
JELLY TOPICAL ONCE
Status: CANCELLED | OUTPATIENT
Start: 2025-08-27 | End: 2025-08-27

## 2025-08-27 RX ORDER — GENTAMICIN SULFATE 1 MG/G
OINTMENT TOPICAL PRN
OUTPATIENT
Start: 2025-08-27

## 2025-08-27 RX ORDER — LIDOCAINE 40 MG/G
CREAM TOPICAL PRN
OUTPATIENT
Start: 2025-08-27

## 2025-08-27 RX ORDER — BACITRACIN ZINC AND POLYMYXIN B SULFATE 500; 1000 [USP'U]/G; [USP'U]/G
OINTMENT TOPICAL ONCE
Status: CANCELLED | OUTPATIENT
Start: 2025-08-27 | End: 2025-08-27

## 2025-08-27 RX ORDER — MUPIROCIN 2 %
OINTMENT (GRAM) TOPICAL PRN
OUTPATIENT
Start: 2025-08-27

## 2025-08-27 RX ORDER — GINSENG 100 MG
CAPSULE ORAL ONCE
Status: CANCELLED | OUTPATIENT
Start: 2025-08-27 | End: 2025-08-27

## 2025-08-27 RX ORDER — GENTAMICIN SULFATE 1 MG/G
OINTMENT TOPICAL ONCE
Status: COMPLETED | OUTPATIENT
Start: 2025-08-27 | End: 2025-08-27

## 2025-08-27 RX ORDER — CLOBETASOL PROPIONATE 0.5 MG/G
OINTMENT TOPICAL PRN
OUTPATIENT
Start: 2025-08-27

## 2025-08-27 RX ORDER — TRIAMCINOLONE ACETONIDE 1 MG/G
OINTMENT TOPICAL ONCE
Status: CANCELLED | OUTPATIENT
Start: 2025-08-27 | End: 2025-08-27

## 2025-08-27 RX ORDER — TRIAMCINOLONE ACETONIDE 1 MG/G
OINTMENT TOPICAL PRN
OUTPATIENT
Start: 2025-08-27

## 2025-08-27 RX ORDER — CLOBETASOL PROPIONATE 0.5 MG/G
OINTMENT TOPICAL ONCE
Status: CANCELLED | OUTPATIENT
Start: 2025-08-27 | End: 2025-08-27

## 2025-08-27 RX ORDER — SILVER SULFADIAZINE 10 MG/G
CREAM TOPICAL PRN
OUTPATIENT
Start: 2025-08-27

## 2025-08-27 RX ORDER — MUPIROCIN 2 %
OINTMENT (GRAM) TOPICAL ONCE
Status: CANCELLED | OUTPATIENT
Start: 2025-08-27 | End: 2025-08-27

## 2025-08-27 RX ORDER — OXYCODONE AND ACETAMINOPHEN 7.5; 325 MG/1; MG/1
1 TABLET ORAL EVERY 8 HOURS PRN
Qty: 90 TABLET | Refills: 0 | Status: SHIPPED | OUTPATIENT
Start: 2025-08-27 | End: 2025-09-26

## 2025-08-27 RX ORDER — GINSENG 100 MG
CAPSULE ORAL PRN
OUTPATIENT
Start: 2025-08-27

## 2025-08-27 RX ORDER — SILVER SULFADIAZINE 10 MG/G
CREAM TOPICAL ONCE
Status: CANCELLED | OUTPATIENT
Start: 2025-08-27 | End: 2025-08-27

## 2025-08-27 RX ORDER — LIDOCAINE 40 MG/G
CREAM TOPICAL ONCE
Status: CANCELLED | OUTPATIENT
Start: 2025-08-27 | End: 2025-08-27

## 2025-08-27 RX ORDER — BACITRACIN ZINC AND POLYMYXIN B SULFATE 500; 1000 [USP'U]/G; [USP'U]/G
OINTMENT TOPICAL PRN
OUTPATIENT
Start: 2025-08-27

## 2025-08-27 RX ORDER — BETAMETHASONE DIPROPIONATE 0.5 MG/G
CREAM TOPICAL PRN
OUTPATIENT
Start: 2025-08-27

## 2025-08-27 RX ORDER — LIDOCAINE 50 MG/G
OINTMENT TOPICAL ONCE
Status: CANCELLED | OUTPATIENT
Start: 2025-08-27 | End: 2025-08-27

## 2025-08-27 RX ORDER — SODIUM CHLOR/HYPOCHLOROUS ACID 0.033 %
SOLUTION, IRRIGATION IRRIGATION PRN
OUTPATIENT
Start: 2025-08-27

## 2025-08-27 RX ORDER — LIDOCAINE HYDROCHLORIDE 40 MG/ML
SOLUTION TOPICAL ONCE
Status: CANCELLED | OUTPATIENT
Start: 2025-08-27 | End: 2025-08-27

## 2025-08-27 RX ORDER — BETAMETHASONE DIPROPIONATE 0.5 MG/G
CREAM TOPICAL ONCE
Status: CANCELLED | OUTPATIENT
Start: 2025-08-27 | End: 2025-08-27

## 2025-08-27 RX ORDER — GENTAMICIN SULFATE 1 MG/G
OINTMENT TOPICAL ONCE
Status: CANCELLED | OUTPATIENT
Start: 2025-08-27 | End: 2025-08-27

## 2025-08-27 RX ORDER — SODIUM CHLOR/HYPOCHLOROUS ACID 0.033 %
SOLUTION, IRRIGATION IRRIGATION ONCE
Status: CANCELLED | OUTPATIENT
Start: 2025-08-27 | End: 2025-08-27

## 2025-08-27 RX ORDER — LIDOCAINE HYDROCHLORIDE 40 MG/ML
SOLUTION TOPICAL PRN
OUTPATIENT
Start: 2025-08-27

## 2025-08-27 RX ORDER — LIDOCAINE 50 MG/G
OINTMENT TOPICAL PRN
OUTPATIENT
Start: 2025-08-27

## 2025-08-27 RX ADMIN — GENTAMICIN SULFATE: 1 OINTMENT TOPICAL at 10:54

## 2025-08-27 ASSESSMENT — PAIN DESCRIPTION - LOCATION: LOCATION: KNEE

## 2025-08-27 ASSESSMENT — PAIN SCALES - GENERAL: PAINLEVEL_OUTOF10: 7

## 2025-08-27 ASSESSMENT — PAIN DESCRIPTION - ORIENTATION: ORIENTATION: RIGHT

## 2025-08-27 ASSESSMENT — PAIN DESCRIPTION - DESCRIPTORS: DESCRIPTORS: ACHING;SHARP

## 2025-08-27 ASSESSMENT — PAIN DESCRIPTION - FREQUENCY: FREQUENCY: CONTINUOUS

## 2025-09-01 ENCOUNTER — HOSPITAL ENCOUNTER (INPATIENT)
Age: 50
LOS: 3 days | Discharge: HOME | DRG: 166 | End: 2025-09-04
Attending: EMERGENCY MEDICINE | Admitting: INTERNAL MEDICINE
Payer: MEDICARE

## 2025-09-01 ENCOUNTER — APPOINTMENT (OUTPATIENT)
Dept: GENERAL RADIOLOGY | Age: 50
DRG: 166 | End: 2025-09-01
Payer: MEDICARE

## 2025-09-01 DIAGNOSIS — R09.02 HYPOXIA: ICD-10-CM

## 2025-09-01 DIAGNOSIS — J18.9 PNEUMONIA OF RIGHT LOWER LOBE DUE TO INFECTIOUS ORGANISM: Primary | ICD-10-CM

## 2025-09-01 DIAGNOSIS — Z99.2 ESRD ON HEMODIALYSIS (HCC): ICD-10-CM

## 2025-09-01 DIAGNOSIS — R79.89 ELEVATED TROPONIN: ICD-10-CM

## 2025-09-01 DIAGNOSIS — D64.9 CHRONIC ANEMIA: ICD-10-CM

## 2025-09-01 DIAGNOSIS — N18.6 ESRD ON HEMODIALYSIS (HCC): ICD-10-CM

## 2025-09-01 DIAGNOSIS — J90 PLEURAL EFFUSION: ICD-10-CM

## 2025-09-01 LAB
ALBUMIN SERPL-MCNC: 4.3 G/DL (ref 3.4–5)
ALBUMIN/GLOB SERPL: 1.3 {RATIO} (ref 1.1–2.2)
ALP SERPL-CCNC: 93 U/L (ref 40–129)
ALT SERPL-CCNC: 11 U/L (ref 10–40)
ANION GAP SERPL CALCULATED.3IONS-SCNC: 15 MMOL/L (ref 9–17)
ARTERIAL PATENCY WRIST A: NO
AST SERPL-CCNC: 20 U/L (ref 15–37)
B PARAP IS1001 DNA NPH QL NAA+NON-PROBE: NOT DETECTED
B PERT DNA SPEC QL NAA+PROBE: NOT DETECTED
BDY SITE: ABNORMAL
BILIRUB SERPL-MCNC: 0.8 MG/DL (ref 0–1)
BNP SERPL-MCNC: ABNORMAL PG/ML (ref 0–125)
BODY TEMPERATURE: 37
BUN SERPL-MCNC: 22 MG/DL (ref 7–20)
C PNEUM DNA NPH QL NAA+NON-PROBE: NOT DETECTED
CALCIUM SERPL-MCNC: 8.6 MG/DL (ref 8.3–10.6)
CHLORIDE SERPL-SCNC: 91 MMOL/L (ref 99–110)
CO2 SERPL-SCNC: 27 MMOL/L (ref 21–32)
COHGB MFR BLD: 1.3 % (ref 0.5–1.5)
CREAT SERPL-MCNC: 3.9 MG/DL (ref 0.9–1.3)
ERYTHROCYTE [DISTWIDTH] IN BLOOD BY AUTOMATED COUNT: 15.3 % (ref 11.7–14.9)
FLUAV RNA NPH QL NAA+NON-PROBE: NOT DETECTED
FLUBV RNA NPH QL NAA+NON-PROBE: NOT DETECTED
GFR, ESTIMATED: 16 ML/MIN/1.73M2
GLUCOSE BLD-MCNC: 98 MG/DL (ref 74–99)
GLUCOSE SERPL-MCNC: 149 MG/DL (ref 74–99)
HADV DNA NPH QL NAA+NON-PROBE: NOT DETECTED
HCO3 VENOUS: 34 MMOL/L (ref 22–29)
HCOV 229E RNA NPH QL NAA+NON-PROBE: NOT DETECTED
HCOV HKU1 RNA NPH QL NAA+NON-PROBE: NOT DETECTED
HCOV NL63 RNA NPH QL NAA+NON-PROBE: NOT DETECTED
HCOV OC43 RNA NPH QL NAA+NON-PROBE: NOT DETECTED
HCT VFR BLD AUTO: 35.4 % (ref 42–52)
HGB BLD-MCNC: 11.3 G/DL (ref 13.5–18)
HMPV RNA NPH QL NAA+NON-PROBE: NOT DETECTED
HPIV1 RNA NPH QL NAA+NON-PROBE: NOT DETECTED
HPIV2 RNA NPH QL NAA+NON-PROBE: NOT DETECTED
HPIV3 RNA NPH QL NAA+NON-PROBE: NOT DETECTED
HPIV4 RNA NPH QL NAA+NON-PROBE: NOT DETECTED
LACTATE BLDV-SCNC: 1.2 MMOL/L (ref 0.4–2)
M PNEUMO DNA NPH QL NAA+NON-PROBE: NOT DETECTED
MCH RBC QN AUTO: 29 PG (ref 27–31)
MCHC RBC AUTO-ENTMCNC: 31.9 G/DL (ref 32–36)
MCV RBC AUTO: 91 FL (ref 78–100)
METHEMOGLOBIN: 0.6 % (ref 0.5–1.5)
OXYHGB MFR BLD: 35.1 %
PCO2 VENOUS: 50.3 MM HG (ref 38–54)
PH VENOUS: 7.45 (ref 7.32–7.43)
PLATELET # BLD AUTO: 166 K/UL (ref 140–440)
PMV BLD AUTO: 10.4 FL (ref 7.5–11.1)
PO2 VENOUS: 22.1 MM HG (ref 23–48)
POSITIVE BASE EXCESS, VEN: 8.6 MMOL/L (ref 0–3)
POTASSIUM SERPL-SCNC: 3.6 MMOL/L (ref 3.5–5.1)
PROCALCITONIN SERPL-MCNC: 0.71 NG/ML
PROT SERPL-MCNC: 7.4 G/DL (ref 6.4–8.2)
RBC # BLD AUTO: 3.89 M/UL (ref 4.6–6.2)
RSV RNA NPH QL NAA+NON-PROBE: NOT DETECTED
RV+EV RNA NPH QL NAA+NON-PROBE: NOT DETECTED
SARS-COV-2 RNA NPH QL NAA+NON-PROBE: NOT DETECTED
SODIUM SERPL-SCNC: 133 MMOL/L (ref 136–145)
SPECIMEN DESCRIPTION: NORMAL
TROPONIN I SERPL HS-MCNC: 166 NG/L (ref 0–22)
TROPONIN I SERPL HS-MCNC: 180 NG/L (ref 0–22)
WBC OTHER # BLD: 6.4 K/UL (ref 4–10.5)

## 2025-09-01 PROCEDURE — 82962 GLUCOSE BLOOD TEST: CPT

## 2025-09-01 PROCEDURE — 2580000003 HC RX 258: Performed by: EMERGENCY MEDICINE

## 2025-09-01 PROCEDURE — 36415 COLL VENOUS BLD VENIPUNCTURE: CPT

## 2025-09-01 PROCEDURE — 83605 ASSAY OF LACTIC ACID: CPT

## 2025-09-01 PROCEDURE — 6370000000 HC RX 637 (ALT 250 FOR IP): Performed by: EMERGENCY MEDICINE

## 2025-09-01 PROCEDURE — 96365 THER/PROPH/DIAG IV INF INIT: CPT

## 2025-09-01 PROCEDURE — 83880 ASSAY OF NATRIURETIC PEPTIDE: CPT

## 2025-09-01 PROCEDURE — 99285 EMERGENCY DEPT VISIT HI MDM: CPT

## 2025-09-01 PROCEDURE — 0202U NFCT DS 22 TRGT SARS-COV-2: CPT

## 2025-09-01 PROCEDURE — 84484 ASSAY OF TROPONIN QUANT: CPT

## 2025-09-01 PROCEDURE — 2500000003 HC RX 250 WO HCPCS: Performed by: INTERNAL MEDICINE

## 2025-09-01 PROCEDURE — 6370000000 HC RX 637 (ALT 250 FOR IP): Performed by: INTERNAL MEDICINE

## 2025-09-01 PROCEDURE — 85027 COMPLETE CBC AUTOMATED: CPT

## 2025-09-01 PROCEDURE — 2500000003 HC RX 250 WO HCPCS: Performed by: EMERGENCY MEDICINE

## 2025-09-01 PROCEDURE — 96375 TX/PRO/DX INJ NEW DRUG ADDON: CPT

## 2025-09-01 PROCEDURE — 71045 X-RAY EXAM CHEST 1 VIEW: CPT

## 2025-09-01 PROCEDURE — 84145 PROCALCITONIN (PCT): CPT

## 2025-09-01 PROCEDURE — 80053 COMPREHEN METABOLIC PANEL: CPT

## 2025-09-01 PROCEDURE — 1200000000 HC SEMI PRIVATE

## 2025-09-01 PROCEDURE — 6360000002 HC RX W HCPCS: Performed by: INTERNAL MEDICINE

## 2025-09-01 PROCEDURE — 82805 BLOOD GASES W/O2 SATURATION: CPT

## 2025-09-01 PROCEDURE — 93005 ELECTROCARDIOGRAM TRACING: CPT | Performed by: EMERGENCY MEDICINE

## 2025-09-01 PROCEDURE — 87040 BLOOD CULTURE FOR BACTERIA: CPT

## 2025-09-01 PROCEDURE — 6360000002 HC RX W HCPCS: Performed by: EMERGENCY MEDICINE

## 2025-09-01 RX ORDER — SODIUM CHLORIDE 0.9 % (FLUSH) 0.9 %
5-40 SYRINGE (ML) INJECTION EVERY 12 HOURS SCHEDULED
Status: DISCONTINUED | OUTPATIENT
Start: 2025-09-01 | End: 2025-09-04 | Stop reason: HOSPADM

## 2025-09-01 RX ORDER — CALCITRIOL 0.25 UG/1
0.5 CAPSULE, LIQUID FILLED ORAL 2 TIMES DAILY
Status: DISCONTINUED | OUTPATIENT
Start: 2025-09-01 | End: 2025-09-04 | Stop reason: HOSPADM

## 2025-09-01 RX ORDER — ROPINIROLE 0.25 MG/1
0.25 TABLET, FILM COATED ORAL NIGHTLY
Status: DISCONTINUED | OUTPATIENT
Start: 2025-09-01 | End: 2025-09-04 | Stop reason: HOSPADM

## 2025-09-01 RX ORDER — CLOPIDOGREL BISULFATE 75 MG/1
75 TABLET ORAL DAILY
Status: DISCONTINUED | OUTPATIENT
Start: 2025-09-02 | End: 2025-09-04 | Stop reason: HOSPADM

## 2025-09-01 RX ORDER — ALBUTEROL SULFATE 90 UG/1
2 INHALANT RESPIRATORY (INHALATION) 4 TIMES DAILY PRN
Status: DISCONTINUED | OUTPATIENT
Start: 2025-09-01 | End: 2025-09-04 | Stop reason: HOSPADM

## 2025-09-01 RX ORDER — INSULIN GLARGINE 100 [IU]/ML
15 INJECTION, SOLUTION SUBCUTANEOUS NIGHTLY
Status: DISCONTINUED | OUTPATIENT
Start: 2025-09-01 | End: 2025-09-04 | Stop reason: HOSPADM

## 2025-09-01 RX ORDER — TRAZODONE HYDROCHLORIDE 50 MG/1
150 TABLET ORAL NIGHTLY
Status: DISCONTINUED | OUTPATIENT
Start: 2025-09-01 | End: 2025-09-04 | Stop reason: HOSPADM

## 2025-09-01 RX ORDER — INSULIN LISPRO 100 [IU]/ML
0-8 INJECTION, SOLUTION INTRAVENOUS; SUBCUTANEOUS
Status: DISCONTINUED | OUTPATIENT
Start: 2025-09-01 | End: 2025-09-04 | Stop reason: HOSPADM

## 2025-09-01 RX ORDER — BUDESONIDE AND FORMOTEROL FUMARATE DIHYDRATE 160; 4.5 UG/1; UG/1
2 AEROSOL RESPIRATORY (INHALATION)
Status: DISCONTINUED | OUTPATIENT
Start: 2025-09-01 | End: 2025-09-04 | Stop reason: HOSPADM

## 2025-09-01 RX ORDER — ONDANSETRON 2 MG/ML
4 INJECTION INTRAMUSCULAR; INTRAVENOUS EVERY 6 HOURS PRN
Status: DISCONTINUED | OUTPATIENT
Start: 2025-09-01 | End: 2025-09-04 | Stop reason: HOSPADM

## 2025-09-01 RX ORDER — OXYCODONE AND ACETAMINOPHEN 7.5; 325 MG/1; MG/1
1 TABLET ORAL ONCE
Refills: 0 | Status: COMPLETED | OUTPATIENT
Start: 2025-09-01 | End: 2025-09-01

## 2025-09-01 RX ORDER — SEVELAMER CARBONATE 800 MG/1
800 TABLET, FILM COATED ORAL
Status: DISCONTINUED | OUTPATIENT
Start: 2025-09-02 | End: 2025-09-04 | Stop reason: HOSPADM

## 2025-09-01 RX ORDER — METOPROLOL TARTRATE 25 MG/1
12.5 TABLET, FILM COATED ORAL 2 TIMES DAILY
Status: DISCONTINUED | OUTPATIENT
Start: 2025-09-01 | End: 2025-09-04 | Stop reason: HOSPADM

## 2025-09-01 RX ORDER — HEPARIN SODIUM 5000 [USP'U]/ML
5000 INJECTION, SOLUTION INTRAVENOUS; SUBCUTANEOUS EVERY 8 HOURS SCHEDULED
Status: DISCONTINUED | OUTPATIENT
Start: 2025-09-01 | End: 2025-09-04 | Stop reason: HOSPADM

## 2025-09-01 RX ORDER — POLYETHYLENE GLYCOL 3350 17 G/17G
17 POWDER, FOR SOLUTION ORAL DAILY PRN
Status: DISCONTINUED | OUTPATIENT
Start: 2025-09-01 | End: 2025-09-04 | Stop reason: HOSPADM

## 2025-09-01 RX ORDER — SODIUM CHLORIDE 0.9 % (FLUSH) 0.9 %
5-40 SYRINGE (ML) INJECTION PRN
Status: DISCONTINUED | OUTPATIENT
Start: 2025-09-01 | End: 2025-09-04 | Stop reason: HOSPADM

## 2025-09-01 RX ORDER — SODIUM CHLORIDE 9 MG/ML
INJECTION, SOLUTION INTRAVENOUS PRN
Status: DISCONTINUED | OUTPATIENT
Start: 2025-09-01 | End: 2025-09-04 | Stop reason: HOSPADM

## 2025-09-01 RX ORDER — PANTOPRAZOLE SODIUM 40 MG/1
40 TABLET, DELAYED RELEASE ORAL DAILY
Status: DISCONTINUED | OUTPATIENT
Start: 2025-09-02 | End: 2025-09-04 | Stop reason: HOSPADM

## 2025-09-01 RX ORDER — ONDANSETRON 4 MG/1
4 TABLET, ORALLY DISINTEGRATING ORAL EVERY 8 HOURS PRN
Status: DISCONTINUED | OUTPATIENT
Start: 2025-09-01 | End: 2025-09-04 | Stop reason: HOSPADM

## 2025-09-01 RX ORDER — ATORVASTATIN CALCIUM 40 MG/1
40 TABLET, FILM COATED ORAL NIGHTLY
Status: DISCONTINUED | OUTPATIENT
Start: 2025-09-01 | End: 2025-09-04 | Stop reason: HOSPADM

## 2025-09-01 RX ORDER — OXYCODONE AND ACETAMINOPHEN 7.5; 325 MG/1; MG/1
1 TABLET ORAL EVERY 8 HOURS PRN
Status: DISCONTINUED | OUTPATIENT
Start: 2025-09-01 | End: 2025-09-04 | Stop reason: HOSPADM

## 2025-09-01 RX ORDER — ACETAMINOPHEN 325 MG/1
650 TABLET ORAL EVERY 6 HOURS PRN
Status: DISCONTINUED | OUTPATIENT
Start: 2025-09-01 | End: 2025-09-04 | Stop reason: HOSPADM

## 2025-09-01 RX ORDER — GLUCAGON 1 MG/ML
1 KIT INJECTION PRN
Status: DISCONTINUED | OUTPATIENT
Start: 2025-09-01 | End: 2025-09-04 | Stop reason: HOSPADM

## 2025-09-01 RX ORDER — ISOSORBIDE MONONITRATE 30 MG/1
15 TABLET, EXTENDED RELEASE ORAL DAILY
Status: DISCONTINUED | OUTPATIENT
Start: 2025-09-02 | End: 2025-09-04 | Stop reason: HOSPADM

## 2025-09-01 RX ORDER — METOCLOPRAMIDE 10 MG/1
10 TABLET ORAL
Status: DISCONTINUED | OUTPATIENT
Start: 2025-09-02 | End: 2025-09-04 | Stop reason: HOSPADM

## 2025-09-01 RX ORDER — DEXTROSE MONOHYDRATE 100 MG/ML
INJECTION, SOLUTION INTRAVENOUS CONTINUOUS PRN
Status: DISCONTINUED | OUTPATIENT
Start: 2025-09-01 | End: 2025-09-04 | Stop reason: HOSPADM

## 2025-09-01 RX ORDER — HYDRALAZINE HYDROCHLORIDE 25 MG/1
25 TABLET, FILM COATED ORAL 2 TIMES DAILY
Status: DISCONTINUED | OUTPATIENT
Start: 2025-09-01 | End: 2025-09-04 | Stop reason: HOSPADM

## 2025-09-01 RX ORDER — ACETAMINOPHEN 650 MG/1
650 SUPPOSITORY RECTAL EVERY 6 HOURS PRN
Status: DISCONTINUED | OUTPATIENT
Start: 2025-09-01 | End: 2025-09-04 | Stop reason: HOSPADM

## 2025-09-01 RX ORDER — ASPIRIN 81 MG/1
81 TABLET, CHEWABLE ORAL DAILY
Status: DISCONTINUED | OUTPATIENT
Start: 2025-09-02 | End: 2025-09-04 | Stop reason: HOSPADM

## 2025-09-01 RX ADMIN — OXYCODONE HYDROCHLORIDE AND ACETAMINOPHEN 1 TABLET: 7.5; 325 TABLET ORAL at 21:20

## 2025-09-01 RX ADMIN — ATORVASTATIN CALCIUM 40 MG: 40 TABLET, FILM COATED ORAL at 23:22

## 2025-09-01 RX ADMIN — TRAZODONE HYDROCHLORIDE 150 MG: 50 TABLET ORAL at 23:22

## 2025-09-01 RX ADMIN — CALCITRIOL CAPSULES 0.25 MCG 0.5 MCG: 0.25 CAPSULE ORAL at 23:21

## 2025-09-01 RX ADMIN — ONDANSETRON 4 MG: 2 INJECTION INTRAMUSCULAR; INTRAVENOUS at 23:22

## 2025-09-01 RX ADMIN — WATER 1000 MG: 1 INJECTION INTRAMUSCULAR; INTRAVENOUS; SUBCUTANEOUS at 20:11

## 2025-09-01 RX ADMIN — SODIUM CHLORIDE, PRESERVATIVE FREE 10 ML: 5 INJECTION INTRAVENOUS at 23:27

## 2025-09-01 RX ADMIN — HEPARIN SODIUM 5000 UNITS: 5000 INJECTION INTRAVENOUS; SUBCUTANEOUS at 23:23

## 2025-09-01 RX ADMIN — ROPINIROLE HYDROCHLORIDE 0.25 MG: 0.25 TABLET, FILM COATED ORAL at 23:22

## 2025-09-01 RX ADMIN — METOPROLOL TARTRATE 12.5 MG: 25 TABLET, FILM COATED ORAL at 23:22

## 2025-09-01 RX ADMIN — AZITHROMYCIN MONOHYDRATE 500 MG: 500 INJECTION, POWDER, LYOPHILIZED, FOR SOLUTION INTRAVENOUS at 20:15

## 2025-09-01 RX ADMIN — HYDRALAZINE HYDROCHLORIDE 25 MG: 25 TABLET ORAL at 23:22

## 2025-09-01 ASSESSMENT — PAIN - FUNCTIONAL ASSESSMENT
PAIN_FUNCTIONAL_ASSESSMENT: 0-10
PAIN_FUNCTIONAL_ASSESSMENT: 0-10

## 2025-09-01 ASSESSMENT — PAIN DESCRIPTION - PAIN TYPE: TYPE: CHRONIC PAIN

## 2025-09-01 ASSESSMENT — PAIN SCALES - GENERAL
PAINLEVEL_OUTOF10: 7

## 2025-09-01 ASSESSMENT — PAIN DESCRIPTION - DESCRIPTORS: DESCRIPTORS: ACHING

## 2025-09-01 ASSESSMENT — PAIN DESCRIPTION - ORIENTATION: ORIENTATION: RIGHT;LEFT;LOWER

## 2025-09-01 ASSESSMENT — PAIN DESCRIPTION - LOCATION
LOCATION: BACK
LOCATION: BACK;LEG
LOCATION: HEAD

## 2025-09-02 ENCOUNTER — APPOINTMENT (OUTPATIENT)
Dept: CT IMAGING | Age: 50
DRG: 166 | End: 2025-09-02
Payer: MEDICARE

## 2025-09-02 LAB
ANION GAP SERPL CALCULATED.3IONS-SCNC: 14 MMOL/L (ref 9–17)
BASOPHILS # BLD: 0.06 K/UL
BASOPHILS NFR BLD: 1 % (ref 0–1)
BUN SERPL-MCNC: 35 MG/DL (ref 7–20)
CALCIUM SERPL-MCNC: 8.3 MG/DL (ref 8.3–10.6)
CHLORIDE SERPL-SCNC: 97 MMOL/L (ref 99–110)
CO2 SERPL-SCNC: 26 MMOL/L (ref 21–32)
CREAT SERPL-MCNC: 5.2 MG/DL (ref 0.9–1.3)
EOSINOPHIL # BLD: 0.16 K/UL
EOSINOPHILS RELATIVE PERCENT: 3 % (ref 0–3)
ERYTHROCYTE [DISTWIDTH] IN BLOOD BY AUTOMATED COUNT: 15.3 % (ref 11.7–14.9)
GFR, ESTIMATED: 12 ML/MIN/1.73M2
GLUCOSE BLD-MCNC: 142 MG/DL (ref 74–99)
GLUCOSE BLD-MCNC: 245 MG/DL (ref 74–99)
GLUCOSE BLD-MCNC: 259 MG/DL (ref 74–99)
GLUCOSE BLD-MCNC: 304 MG/DL (ref 74–99)
GLUCOSE SERPL-MCNC: 131 MG/DL (ref 74–99)
HCT VFR BLD AUTO: 30 % (ref 42–52)
HGB BLD-MCNC: 9.1 G/DL (ref 13.5–18)
IMM GRANULOCYTES # BLD AUTO: 0.02 K/UL
IMM GRANULOCYTES NFR BLD: 0 %
LYMPHOCYTES NFR BLD: 0.4 K/UL
LYMPHOCYTES RELATIVE PERCENT: 7 % (ref 24–44)
MCH RBC QN AUTO: 29.2 PG (ref 27–31)
MCHC RBC AUTO-ENTMCNC: 30.3 G/DL (ref 32–36)
MCV RBC AUTO: 96.2 FL (ref 78–100)
MONOCYTES NFR BLD: 0.65 K/UL
MONOCYTES NFR BLD: 11 % (ref 0–5)
NEUTROPHILS NFR BLD: 78 % (ref 36–66)
NEUTS SEG NFR BLD: 4.69 K/UL
PLATELET # BLD AUTO: 170 K/UL (ref 140–440)
PMV BLD AUTO: 10.8 FL (ref 7.5–11.1)
POTASSIUM SERPL-SCNC: 3.9 MMOL/L (ref 3.5–5.1)
RBC # BLD AUTO: 3.12 M/UL (ref 4.6–6.2)
SODIUM SERPL-SCNC: 137 MMOL/L (ref 136–145)
WBC OTHER # BLD: 6 K/UL (ref 4–10.5)

## 2025-09-02 PROCEDURE — 2500000003 HC RX 250 WO HCPCS: Performed by: INTERNAL MEDICINE

## 2025-09-02 PROCEDURE — 87633 RESP VIRUS 12-25 TARGETS: CPT

## 2025-09-02 PROCEDURE — 71250 CT THORAX DX C-: CPT

## 2025-09-02 PROCEDURE — 94761 N-INVAS EAR/PLS OXIMETRY MLT: CPT

## 2025-09-02 PROCEDURE — 85025 COMPLETE CBC W/AUTO DIFF WBC: CPT

## 2025-09-02 PROCEDURE — 94640 AIRWAY INHALATION TREATMENT: CPT

## 2025-09-02 PROCEDURE — 6360000002 HC RX W HCPCS: Performed by: INTERNAL MEDICINE

## 2025-09-02 PROCEDURE — 82962 GLUCOSE BLOOD TEST: CPT

## 2025-09-02 PROCEDURE — 6370000000 HC RX 637 (ALT 250 FOR IP): Performed by: INTERNAL MEDICINE

## 2025-09-02 PROCEDURE — 1200000000 HC SEMI PRIVATE

## 2025-09-02 PROCEDURE — 2700000000 HC OXYGEN THERAPY PER DAY

## 2025-09-02 PROCEDURE — 80048 BASIC METABOLIC PNL TOTAL CA: CPT

## 2025-09-02 PROCEDURE — 6370000000 HC RX 637 (ALT 250 FOR IP): Performed by: HOSPITALIST

## 2025-09-02 PROCEDURE — 36415 COLL VENOUS BLD VENIPUNCTURE: CPT

## 2025-09-02 RX ADMIN — SODIUM CHLORIDE, PRESERVATIVE FREE 5 ML: 5 INJECTION INTRAVENOUS at 09:28

## 2025-09-02 RX ADMIN — METOPROLOL TARTRATE 12.5 MG: 25 TABLET, FILM COATED ORAL at 22:35

## 2025-09-02 RX ADMIN — INSULIN GLARGINE 15 UNITS: 100 INJECTION, SOLUTION SUBCUTANEOUS at 22:44

## 2025-09-02 RX ADMIN — INSULIN LISPRO 6 UNITS: 100 INJECTION, SOLUTION INTRAVENOUS; SUBCUTANEOUS at 22:45

## 2025-09-02 RX ADMIN — METOCLOPRAMIDE 10 MG: 10 TABLET ORAL at 09:22

## 2025-09-02 RX ADMIN — HEPARIN SODIUM 5000 UNITS: 5000 INJECTION INTRAVENOUS; SUBCUTANEOUS at 05:42

## 2025-09-02 RX ADMIN — ATORVASTATIN CALCIUM 40 MG: 40 TABLET, FILM COATED ORAL at 22:33

## 2025-09-02 RX ADMIN — CLOPIDOGREL BISULFATE 75 MG: 75 TABLET, FILM COATED ORAL at 09:23

## 2025-09-02 RX ADMIN — INSULIN LISPRO 2 UNITS: 100 INJECTION, SOLUTION INTRAVENOUS; SUBCUTANEOUS at 17:49

## 2025-09-02 RX ADMIN — ASPIRIN 81 MG 81 MG: 81 TABLET ORAL at 09:23

## 2025-09-02 RX ADMIN — SEVELAMER CARBONATE 800 MG: 800 TABLET, FILM COATED ORAL at 09:23

## 2025-09-02 RX ADMIN — ROPINIROLE HYDROCHLORIDE 0.25 MG: 0.25 TABLET, FILM COATED ORAL at 22:33

## 2025-09-02 RX ADMIN — CALCITRIOL CAPSULES 0.25 MCG 0.5 MCG: 0.25 CAPSULE ORAL at 22:35

## 2025-09-02 RX ADMIN — SEVELAMER CARBONATE 800 MG: 800 TABLET, FILM COATED ORAL at 11:34

## 2025-09-02 RX ADMIN — SEVELAMER CARBONATE 800 MG: 800 TABLET, FILM COATED ORAL at 17:43

## 2025-09-02 RX ADMIN — CALCITRIOL CAPSULES 0.25 MCG 0.5 MCG: 0.25 CAPSULE ORAL at 09:22

## 2025-09-02 RX ADMIN — COLLAGENASE SANTYL: 250 OINTMENT TOPICAL at 14:32

## 2025-09-02 RX ADMIN — METOCLOPRAMIDE 10 MG: 10 TABLET ORAL at 17:43

## 2025-09-02 RX ADMIN — OXYCODONE HYDROCHLORIDE AND ACETAMINOPHEN 1 TABLET: 7.5; 325 TABLET ORAL at 05:46

## 2025-09-02 RX ADMIN — INSULIN LISPRO 4 UNITS: 100 INJECTION, SOLUTION INTRAVENOUS; SUBCUTANEOUS at 11:34

## 2025-09-02 RX ADMIN — HEPARIN SODIUM 5000 UNITS: 5000 INJECTION INTRAVENOUS; SUBCUTANEOUS at 22:37

## 2025-09-02 RX ADMIN — TRAZODONE HYDROCHLORIDE 150 MG: 50 TABLET ORAL at 22:33

## 2025-09-02 RX ADMIN — OXYCODONE HYDROCHLORIDE AND ACETAMINOPHEN 1 TABLET: 7.5; 325 TABLET ORAL at 15:32

## 2025-09-02 RX ADMIN — BUDESONIDE AND FORMOTEROL FUMARATE DIHYDRATE 2 PUFF: 160; 4.5 AEROSOL RESPIRATORY (INHALATION) at 07:49

## 2025-09-02 RX ADMIN — BUDESONIDE AND FORMOTEROL FUMARATE DIHYDRATE 2 PUFF: 160; 4.5 AEROSOL RESPIRATORY (INHALATION) at 20:25

## 2025-09-02 RX ADMIN — METOCLOPRAMIDE 10 MG: 10 TABLET ORAL at 11:34

## 2025-09-02 RX ADMIN — HYDRALAZINE HYDROCHLORIDE 25 MG: 25 TABLET ORAL at 22:37

## 2025-09-02 RX ADMIN — PANTOPRAZOLE SODIUM 40 MG: 40 TABLET, DELAYED RELEASE ORAL at 05:46

## 2025-09-02 RX ADMIN — HEPARIN SODIUM 5000 UNITS: 5000 INJECTION INTRAVENOUS; SUBCUTANEOUS at 14:11

## 2025-09-02 RX ADMIN — ISOSORBIDE MONONITRATE 15 MG: 30 TABLET, EXTENDED RELEASE ORAL at 09:23

## 2025-09-02 ASSESSMENT — PAIN DESCRIPTION - DESCRIPTORS
DESCRIPTORS: ACHING;DISCOMFORT
DESCRIPTORS: THROBBING;SHARP
DESCRIPTORS: ACHING;DISCOMFORT

## 2025-09-02 ASSESSMENT — PAIN DESCRIPTION - ORIENTATION
ORIENTATION: RIGHT;LOWER
ORIENTATION: RIGHT
ORIENTATION: LOWER;RIGHT;LEFT
ORIENTATION: RIGHT;LOWER
ORIENTATION: RIGHT

## 2025-09-02 ASSESSMENT — PAIN - FUNCTIONAL ASSESSMENT
PAIN_FUNCTIONAL_ASSESSMENT: 0-10
PAIN_FUNCTIONAL_ASSESSMENT: 0-10
PAIN_FUNCTIONAL_ASSESSMENT: ACTIVITIES ARE NOT PREVENTED
PAIN_FUNCTIONAL_ASSESSMENT: ACTIVITIES ARE NOT PREVENTED
PAIN_FUNCTIONAL_ASSESSMENT: PREVENTS OR INTERFERES SOME ACTIVE ACTIVITIES AND ADLS
PAIN_FUNCTIONAL_ASSESSMENT: 0-10
PAIN_FUNCTIONAL_ASSESSMENT: ACTIVITIES ARE NOT PREVENTED
PAIN_FUNCTIONAL_ASSESSMENT: 0-10
PAIN_FUNCTIONAL_ASSESSMENT: 0-10

## 2025-09-02 ASSESSMENT — PAIN SCALES - GENERAL
PAINLEVEL_OUTOF10: 6
PAINLEVEL_OUTOF10: 5
PAINLEVEL_OUTOF10: 4
PAINLEVEL_OUTOF10: 5
PAINLEVEL_OUTOF10: 6
PAINLEVEL_OUTOF10: 7
PAINLEVEL_OUTOF10: 6
PAINLEVEL_OUTOF10: 6

## 2025-09-02 ASSESSMENT — PAIN DESCRIPTION - LOCATION
LOCATION: BACK;FOOT;LEG
LOCATION: LEG;KNEE;BACK
LOCATION: BACK;FOOT;LEG;KNEE
LOCATION: LEG;BACK
LOCATION: LEG;BACK;KNEE
LOCATION: BACK;FOOT;LEG

## 2025-09-02 ASSESSMENT — PAIN DESCRIPTION - PAIN TYPE
TYPE: ACUTE PAIN;CHRONIC PAIN
TYPE: CHRONIC PAIN
TYPE: CHRONIC PAIN

## 2025-09-02 ASSESSMENT — PAIN SCALES - WONG BAKER: WONGBAKER_NUMERICALRESPONSE: HURTS EVEN MORE

## 2025-09-02 ASSESSMENT — PAIN DESCRIPTION - FREQUENCY: FREQUENCY: CONTINUOUS

## 2025-09-03 ENCOUNTER — APPOINTMENT (OUTPATIENT)
Dept: INTERVENTIONAL RADIOLOGY/VASCULAR | Age: 50
DRG: 166 | End: 2025-09-03
Payer: MEDICARE

## 2025-09-03 ENCOUNTER — TELEPHONE (OUTPATIENT)
Dept: CARDIOLOGY CLINIC | Age: 50
End: 2025-09-03

## 2025-09-03 ENCOUNTER — APPOINTMENT (OUTPATIENT)
Dept: INTERVENTIONAL RADIOLOGY/VASCULAR | Age: 50
DRG: 166 | End: 2025-09-03
Attending: INTERNAL MEDICINE
Payer: MEDICARE

## 2025-09-03 ENCOUNTER — APPOINTMENT (OUTPATIENT)
Dept: GENERAL RADIOLOGY | Age: 50
DRG: 166 | End: 2025-09-03
Payer: MEDICARE

## 2025-09-03 LAB
ACINETOBACTER CALCOACETICUS-BAUMANNII BY PCR: NOT DETECTED
ADENOVIRUS: NOT DETECTED
ANION GAP SERPL CALCULATED.3IONS-SCNC: 17 MMOL/L (ref 9–17)
APPEARANCE FLD: CLEAR
APPEARANCE FLD: NORMAL
BODY FLD TYPE: NORMAL
BODY FLD TYPE: NORMAL
BUN SERPL-MCNC: 59 MG/DL (ref 7–20)
CALCIUM SERPL-MCNC: 8.3 MG/DL (ref 8.3–10.6)
CHLAMYDIA PNEUMONIAE BY PCR: NOT DETECTED
CHLORIDE SERPL-SCNC: 95 MMOL/L (ref 99–110)
CLOT CHECK: NORMAL
CLOT CHECK: NORMAL
CO2 SERPL-SCNC: 24 MMOL/L (ref 21–32)
COLOR FLD: NORMAL
COLOR FLD: YELLOW
CORONAVIRUS PCR: NOT DETECTED
CREAT SERPL-MCNC: 8.2 MG/DL (ref 0.9–1.3)
ENTEROBACTER CLOACAE COMPLEX BY PCR: NOT DETECTED
ERYTHROCYTE [DISTWIDTH] IN BLOOD BY AUTOMATED COUNT: 15.4 % (ref 11.7–14.9)
ESCHERICHIA COLI BY PCR: NOT DETECTED
GFR, ESTIMATED: 7 ML/MIN/1.73M2
GLUCOSE BLD-MCNC: 121 MG/DL (ref 74–99)
GLUCOSE BLD-MCNC: 134 MG/DL (ref 74–99)
GLUCOSE BLD-MCNC: 215 MG/DL (ref 74–99)
GLUCOSE BLD-MCNC: 259 MG/DL (ref 74–99)
GLUCOSE FLD-MCNC: 127 MG/DL
GLUCOSE FLD-MCNC: 147 MG/DL
GLUCOSE SERPL-MCNC: 182 MG/DL (ref 74–99)
GP B STREP DNA SPT NAA+NON-PRB-NCNCRNG: NOT DETECTED
HAEMOPHILUS INFLUENZAE BY PCR: NOT DETECTED
HBV SURFACE AB SERPL IA-ACNC: 3.5 MIU/ML
HBV SURFACE AB SERPL IA-ACNC: 3.5 MIU/ML
HBV SURFACE AG SERPL QL IA: NONREACTIVE
HBV SURFACE AG SERPL QL IA: NONREACTIVE
HCT VFR BLD AUTO: 28.6 % (ref 42–52)
HGB BLD-MCNC: 8.8 G/DL (ref 13.5–18)
HUMAN RHINOVIRUS/ENTEROVIRUS BY PCR: NOT DETECTED
INFLUENZA A BY PCR: NOT DETECTED
INFLUENZA B BY PCR: NOT DETECTED
INR PPP: 1.1
K AEROGENES DNA BAL NAA+NON-PRB-NCNCRNG: NOT DETECTED
K OXYTOCA DNA SPT NAA+NON-PRB-NCNCRNG: NOT DETECTED
K PNEU GRP DNA SPT NAA+NON-PRB-NCNCRNG: NOT DETECTED
LDH FLD L TO P-CCNC: 123 U/L
LDH FLD L TO P-CCNC: 163 U/L
LDH SERPL-CCNC: 189 U/L (ref 100–190)
LEGIONELLA PNEUMOPHILIA BY PCR: NOT DETECTED
M CATARRHALIS DNA BAL NAA+NON-PRB-NCNCRNG: NOT DETECTED
MCH RBC QN AUTO: 28.8 PG (ref 27–31)
MCHC RBC AUTO-ENTMCNC: 30.8 G/DL (ref 32–36)
MCV RBC AUTO: 93.5 FL (ref 78–100)
METAPNEUMOVIRUS BY PCR: NOT DETECTED
MONOCYTES NFR FLD: 79 %
MONOCYTES NFR FLD: 89 %
MYCOPLASMA PNEUMONIAE PCR: NOT DETECTED
NEUTROPHILS NFR FLD: 11 %
NEUTROPHILS NFR FLD: 21 %
P AERUGINOSA DNA SPT NAA+NON-PRB-NCNCRNG: NOT DETECTED
PARAINFLUENZA VIRUS BY PCR: NOT DETECTED
PARTIAL THROMBOPLASTIN TIME: 30.6 SEC (ref 25.1–37.1)
PLATELET # BLD AUTO: 176 K/UL (ref 140–440)
PMV BLD AUTO: 10.3 FL (ref 7.5–11.1)
POTASSIUM SERPL-SCNC: 4.8 MMOL/L (ref 3.5–5.1)
PROT FLD-MCNC: 3.2 G/DL
PROT FLD-MCNC: 3.3 G/DL
PROT SERPL-MCNC: 7.2 G/DL (ref 6.4–8.2)
PROTEUS SP DNA BAL NAA+NON-PRB-NCNCRNG: NOT DETECTED
PROTHROMBIN TIME: 15.2 SEC (ref 11.7–14.5)
RBC # BLD AUTO: 3.06 M/UL (ref 4.6–6.2)
RBC # FLD: 6000 CELLS/UL
RBC # FLD: NORMAL CELLS/UL
RSV BY PCR: NOT DETECTED
S AUREUS DNA SPT NAA+NON-PRB-NCNCRNG: NOT DETECTED
S MARCESCENS DNA SPT NAA+NON-PRB-NCNCRNG: NOT DETECTED
S PNEUM DNA BAL NAA+NON-PRB-NCNCRNG: NOT DETECTED
S PYO DNA SPT NAA+NON-PRB-NCNCRNG: NOT DETECTED
SODIUM SERPL-SCNC: 136 MMOL/L (ref 136–145)
SOURCE: NORMAL
SPECIMEN TYPE: NORMAL
WBC # FLD: 231 CELLS/UL
WBC # FLD: 300 CELLS/UL
WBC OTHER # BLD: 6.1 K/UL (ref 4–10.5)

## 2025-09-03 PROCEDURE — 90935 HEMODIALYSIS ONE EVALUATION: CPT

## 2025-09-03 PROCEDURE — 1200000000 HC SEMI PRIVATE

## 2025-09-03 PROCEDURE — 6360000002 HC RX W HCPCS

## 2025-09-03 PROCEDURE — 6370000000 HC RX 637 (ALT 250 FOR IP): Performed by: INTERNAL MEDICINE

## 2025-09-03 PROCEDURE — 36902 INTRO CATH DIALYSIS CIRCUIT: CPT

## 2025-09-03 PROCEDURE — 76937 US GUIDE VASCULAR ACCESS: CPT

## 2025-09-03 PROCEDURE — 36415 COLL VENOUS BLD VENIPUNCTURE: CPT

## 2025-09-03 PROCEDURE — 6360000002 HC RX W HCPCS: Performed by: RADIOLOGY

## 2025-09-03 PROCEDURE — 85610 PROTHROMBIN TIME: CPT

## 2025-09-03 PROCEDURE — 89051 BODY FLUID CELL COUNT: CPT

## 2025-09-03 PROCEDURE — 71045 X-RAY EXAM CHEST 1 VIEW: CPT

## 2025-09-03 PROCEDURE — 80048 BASIC METABOLIC PNL TOTAL CA: CPT

## 2025-09-03 PROCEDURE — 87075 CULTR BACTERIA EXCEPT BLOOD: CPT

## 2025-09-03 PROCEDURE — 037Y3ZZ DILATION OF UPPER ARTERY, PERCUTANEOUS APPROACH: ICD-10-PCS | Performed by: RADIOLOGY

## 2025-09-03 PROCEDURE — 83615 LACTATE (LD) (LDH) ENZYME: CPT

## 2025-09-03 PROCEDURE — 2500000003 HC RX 250 WO HCPCS: Performed by: INTERNAL MEDICINE

## 2025-09-03 PROCEDURE — 2700000000 HC OXYGEN THERAPY PER DAY

## 2025-09-03 PROCEDURE — 84155 ASSAY OF PROTEIN SERUM: CPT

## 2025-09-03 PROCEDURE — 6360000004 HC RX CONTRAST MEDICATION: Performed by: RADIOLOGY

## 2025-09-03 PROCEDURE — 87340 HEPATITIS B SURFACE AG IA: CPT

## 2025-09-03 PROCEDURE — 0W9B3ZZ DRAINAGE OF LEFT PLEURAL CAVITY, PERCUTANEOUS APPROACH: ICD-10-PCS | Performed by: RADIOLOGY

## 2025-09-03 PROCEDURE — 0W993ZZ DRAINAGE OF RIGHT PLEURAL CAVITY, PERCUTANEOUS APPROACH: ICD-10-PCS | Performed by: RADIOLOGY

## 2025-09-03 PROCEDURE — C1729 CATH, DRAINAGE: HCPCS

## 2025-09-03 PROCEDURE — 32555 ASPIRATE PLEURA W/ IMAGING: CPT

## 2025-09-03 PROCEDURE — 86317 IMMUNOASSAY INFECTIOUS AGENT: CPT

## 2025-09-03 PROCEDURE — 84157 ASSAY OF PROTEIN OTHER: CPT

## 2025-09-03 PROCEDURE — 87205 SMEAR GRAM STAIN: CPT

## 2025-09-03 PROCEDURE — 5A1D70Z PERFORMANCE OF URINARY FILTRATION, INTERMITTENT, LESS THAN 6 HOURS PER DAY: ICD-10-PCS | Performed by: INTERNAL MEDICINE

## 2025-09-03 PROCEDURE — 82945 GLUCOSE OTHER FLUID: CPT

## 2025-09-03 PROCEDURE — 85730 THROMBOPLASTIN TIME PARTIAL: CPT

## 2025-09-03 PROCEDURE — C1894 INTRO/SHEATH, NON-LASER: HCPCS

## 2025-09-03 PROCEDURE — 85027 COMPLETE CBC AUTOMATED: CPT

## 2025-09-03 PROCEDURE — 32555 ASPIRATE PLEURA W/ IMAGING: CPT | Performed by: RADIOLOGY

## 2025-09-03 PROCEDURE — 87070 CULTURE OTHR SPECIMN AEROBIC: CPT

## 2025-09-03 PROCEDURE — 6360000002 HC RX W HCPCS: Performed by: INTERNAL MEDICINE

## 2025-09-03 PROCEDURE — 82962 GLUCOSE BLOOD TEST: CPT

## 2025-09-03 PROCEDURE — 94761 N-INVAS EAR/PLS OXIMETRY MLT: CPT

## 2025-09-03 RX ORDER — IOPAMIDOL 755 MG/ML
INJECTION, SOLUTION INTRAVASCULAR PRN
Status: COMPLETED | OUTPATIENT
Start: 2025-09-03 | End: 2025-09-03

## 2025-09-03 RX ORDER — LIDOCAINE HYDROCHLORIDE 10 MG/ML
INJECTION, SOLUTION EPIDURAL; INFILTRATION; INTRACAUDAL; PERINEURAL PRN
Status: COMPLETED | OUTPATIENT
Start: 2025-09-03 | End: 2025-09-03

## 2025-09-03 RX ADMIN — SODIUM CHLORIDE, PRESERVATIVE FREE 10 ML: 5 INJECTION INTRAVENOUS at 21:16

## 2025-09-03 RX ADMIN — SEVELAMER CARBONATE 800 MG: 800 TABLET, FILM COATED ORAL at 17:27

## 2025-09-03 RX ADMIN — COLLAGENASE SANTYL: 250 OINTMENT TOPICAL at 13:20

## 2025-09-03 RX ADMIN — IOPAMIDOL 75 ML: 755 INJECTION, SOLUTION INTRAVENOUS at 15:37

## 2025-09-03 RX ADMIN — PANTOPRAZOLE SODIUM 40 MG: 40 TABLET, DELAYED RELEASE ORAL at 06:29

## 2025-09-03 RX ADMIN — OXYCODONE HYDROCHLORIDE AND ACETAMINOPHEN 1 TABLET: 7.5; 325 TABLET ORAL at 01:27

## 2025-09-03 RX ADMIN — HYDRALAZINE HYDROCHLORIDE 25 MG: 25 TABLET ORAL at 13:05

## 2025-09-03 RX ADMIN — CALCITRIOL CAPSULES 0.25 MCG 0.5 MCG: 0.25 CAPSULE ORAL at 21:16

## 2025-09-03 RX ADMIN — CALCITRIOL CAPSULES 0.25 MCG 0.5 MCG: 0.25 CAPSULE ORAL at 13:07

## 2025-09-03 RX ADMIN — INSULIN GLARGINE 15 UNITS: 100 INJECTION, SOLUTION SUBCUTANEOUS at 21:14

## 2025-09-03 RX ADMIN — LIDOCAINE HYDROCHLORIDE 5 ML: 10 INJECTION, SOLUTION EPIDURAL; INFILTRATION; INTRACAUDAL; PERINEURAL at 14:15

## 2025-09-03 RX ADMIN — TRAZODONE HYDROCHLORIDE 150 MG: 50 TABLET ORAL at 21:16

## 2025-09-03 RX ADMIN — METOPROLOL TARTRATE 12.5 MG: 25 TABLET, FILM COATED ORAL at 13:07

## 2025-09-03 RX ADMIN — ATORVASTATIN CALCIUM 40 MG: 40 TABLET, FILM COATED ORAL at 21:16

## 2025-09-03 RX ADMIN — ROPINIROLE HYDROCHLORIDE 0.25 MG: 0.25 TABLET, FILM COATED ORAL at 21:16

## 2025-09-03 RX ADMIN — SODIUM CHLORIDE, PRESERVATIVE FREE 10 ML: 5 INJECTION INTRAVENOUS at 13:21

## 2025-09-03 RX ADMIN — METOCLOPRAMIDE 10 MG: 10 TABLET ORAL at 17:27

## 2025-09-03 RX ADMIN — INSULIN LISPRO 2 UNITS: 100 INJECTION, SOLUTION INTRAVENOUS; SUBCUTANEOUS at 21:15

## 2025-09-03 RX ADMIN — HYDRALAZINE HYDROCHLORIDE 25 MG: 25 TABLET ORAL at 21:16

## 2025-09-03 RX ADMIN — ACETAMINOPHEN 650 MG: 325 TABLET ORAL at 17:27

## 2025-09-03 RX ADMIN — INSULIN LISPRO 4 UNITS: 100 INJECTION, SOLUTION INTRAVENOUS; SUBCUTANEOUS at 07:34

## 2025-09-03 RX ADMIN — OXYCODONE HYDROCHLORIDE AND ACETAMINOPHEN 1 TABLET: 7.5; 325 TABLET ORAL at 13:07

## 2025-09-03 RX ADMIN — SODIUM CHLORIDE, PRESERVATIVE FREE 10 ML: 5 INJECTION INTRAVENOUS at 01:28

## 2025-09-03 RX ADMIN — LIDOCAINE HYDROCHLORIDE 5 ML: 10 INJECTION, SOLUTION EPIDURAL; INFILTRATION; INTRACAUDAL; PERINEURAL at 15:13

## 2025-09-03 RX ADMIN — OXYCODONE HYDROCHLORIDE AND ACETAMINOPHEN 1 TABLET: 7.5; 325 TABLET ORAL at 21:17

## 2025-09-03 RX ADMIN — METOPROLOL TARTRATE 12.5 MG: 25 TABLET, FILM COATED ORAL at 21:16

## 2025-09-03 RX ADMIN — HEPARIN SODIUM 5000 UNITS: 5000 INJECTION INTRAVENOUS; SUBCUTANEOUS at 06:29

## 2025-09-03 RX ADMIN — ISOSORBIDE MONONITRATE 15 MG: 30 TABLET, EXTENDED RELEASE ORAL at 13:05

## 2025-09-03 ASSESSMENT — PAIN SCALES - GENERAL
PAINLEVEL_OUTOF10: 0
PAINLEVEL_OUTOF10: 6
PAINLEVEL_OUTOF10: 7
PAINLEVEL_OUTOF10: 7
PAINLEVEL_OUTOF10: 5
PAINLEVEL_OUTOF10: 2
PAINLEVEL_OUTOF10: 0
PAINLEVEL_OUTOF10: 3
PAINLEVEL_OUTOF10: 7
PAINLEVEL_OUTOF10: 5

## 2025-09-03 ASSESSMENT — PAIN DESCRIPTION - ORIENTATION
ORIENTATION: RIGHT
ORIENTATION: MID
ORIENTATION: RIGHT
ORIENTATION: MID
ORIENTATION: RIGHT;LEFT

## 2025-09-03 ASSESSMENT — PAIN DESCRIPTION - PAIN TYPE
TYPE: ACUTE PAIN
TYPE: ACUTE PAIN

## 2025-09-03 ASSESSMENT — PAIN - FUNCTIONAL ASSESSMENT
PAIN_FUNCTIONAL_ASSESSMENT: ACTIVITIES ARE NOT PREVENTED
PAIN_FUNCTIONAL_ASSESSMENT: PREVENTS OR INTERFERES SOME ACTIVE ACTIVITIES AND ADLS
PAIN_FUNCTIONAL_ASSESSMENT: PREVENTS OR INTERFERES WITH MANY ACTIVE NOT PASSIVE ACTIVITIES
PAIN_FUNCTIONAL_ASSESSMENT: 0-10
PAIN_FUNCTIONAL_ASSESSMENT: ACTIVITIES ARE NOT PREVENTED
PAIN_FUNCTIONAL_ASSESSMENT: 0-10
PAIN_FUNCTIONAL_ASSESSMENT: ACTIVITIES ARE NOT PREVENTED

## 2025-09-03 ASSESSMENT — PAIN DESCRIPTION - LOCATION
LOCATION: KNEE
LOCATION: HEAD
LOCATION: HEAD
LOCATION: BACK;FOOT;LEG
LOCATION: BACK;FOOT;KNEE

## 2025-09-03 ASSESSMENT — PAIN DESCRIPTION - DESCRIPTORS
DESCRIPTORS: SHARP;THROBBING
DESCRIPTORS: ACHING
DESCRIPTORS: SHARP;THROBBING
DESCRIPTORS: ACHING
DESCRIPTORS: ACHING

## 2025-09-03 ASSESSMENT — PAIN DESCRIPTION - ONSET: ONSET: ON-GOING

## 2025-09-03 ASSESSMENT — PAIN DESCRIPTION - FREQUENCY: FREQUENCY: CONTINUOUS

## 2025-09-04 VITALS
DIASTOLIC BLOOD PRESSURE: 78 MMHG | HEIGHT: 69 IN | SYSTOLIC BLOOD PRESSURE: 132 MMHG | OXYGEN SATURATION: 96 % | HEART RATE: 63 BPM | RESPIRATION RATE: 17 BRPM | BODY MASS INDEX: 37.03 KG/M2 | WEIGHT: 250 LBS | TEMPERATURE: 98 F

## 2025-09-04 LAB
ALBUMIN SERPL-MCNC: 3.5 G/DL (ref 3.4–5)
ALBUMIN/GLOB SERPL: 1.1 {RATIO} (ref 1.1–2.2)
ALP SERPL-CCNC: 82 U/L (ref 40–129)
ALT SERPL-CCNC: 6 U/L (ref 10–40)
AST SERPL-CCNC: 12 U/L (ref 15–37)
BILIRUB DIRECT SERPL-MCNC: 0.2 MG/DL (ref 0–0.3)
BILIRUB INDIRECT SERPL-MCNC: 0.2 MG/DL (ref 0–0.7)
BILIRUB SERPL-MCNC: 0.4 MG/DL (ref 0–1)
EKG ATRIAL RATE: 88 BPM
EKG DIAGNOSIS: NORMAL
EKG P AXIS: 38 DEGREES
EKG P-R INTERVAL: 206 MS
EKG Q-T INTERVAL: 384 MS
EKG QRS DURATION: 92 MS
EKG QTC CALCULATION (BAZETT): 464 MS
EKG R AXIS: 46 DEGREES
EKG T AXIS: 91 DEGREES
EKG VENTRICULAR RATE: 88 BPM
ERYTHROCYTE [DISTWIDTH] IN BLOOD BY AUTOMATED COUNT: 15.6 % (ref 11.7–14.9)
GLUCOSE BLD-MCNC: 152 MG/DL (ref 74–99)
GLUCOSE BLD-MCNC: 180 MG/DL (ref 74–99)
GLUCOSE BLD-MCNC: 222 MG/DL (ref 74–99)
HCT VFR BLD AUTO: 31.2 % (ref 42–52)
HGB BLD-MCNC: 9.4 G/DL (ref 13.5–18)
MCH RBC QN AUTO: 28.9 PG (ref 27–31)
MCHC RBC AUTO-ENTMCNC: 30.1 G/DL (ref 32–36)
MCV RBC AUTO: 96 FL (ref 78–100)
PLATELET # BLD AUTO: 182 K/UL (ref 140–440)
PMV BLD AUTO: 10.4 FL (ref 7.5–11.1)
PROT SERPL-MCNC: 6.6 G/DL (ref 6.4–8.2)
RBC # BLD AUTO: 3.25 M/UL (ref 4.6–6.2)
WBC OTHER # BLD: 7.9 K/UL (ref 4–10.5)

## 2025-09-04 PROCEDURE — 85027 COMPLETE CBC AUTOMATED: CPT

## 2025-09-04 PROCEDURE — 6370000000 HC RX 637 (ALT 250 FOR IP): Performed by: INTERNAL MEDICINE

## 2025-09-04 PROCEDURE — 6370000000 HC RX 637 (ALT 250 FOR IP): Performed by: NURSE PRACTITIONER

## 2025-09-04 PROCEDURE — 90935 HEMODIALYSIS ONE EVALUATION: CPT

## 2025-09-04 PROCEDURE — 82962 GLUCOSE BLOOD TEST: CPT

## 2025-09-04 PROCEDURE — 94618 PULMONARY STRESS TESTING: CPT

## 2025-09-04 PROCEDURE — 2700000000 HC OXYGEN THERAPY PER DAY

## 2025-09-04 PROCEDURE — 36415 COLL VENOUS BLD VENIPUNCTURE: CPT

## 2025-09-04 PROCEDURE — 94761 N-INVAS EAR/PLS OXIMETRY MLT: CPT

## 2025-09-04 PROCEDURE — 93010 ELECTROCARDIOGRAM REPORT: CPT | Performed by: INTERNAL MEDICINE

## 2025-09-04 PROCEDURE — 80076 HEPATIC FUNCTION PANEL: CPT

## 2025-09-04 RX ORDER — BUTALBITAL, ACETAMINOPHEN AND CAFFEINE 50; 325; 40 MG/1; MG/1; MG/1
2 TABLET ORAL EVERY 8 HOURS PRN
Status: DISCONTINUED | OUTPATIENT
Start: 2025-09-04 | End: 2025-09-04 | Stop reason: HOSPADM

## 2025-09-04 RX ADMIN — INSULIN LISPRO 2 UNITS: 100 INJECTION, SOLUTION INTRAVENOUS; SUBCUTANEOUS at 12:04

## 2025-09-04 RX ADMIN — PANTOPRAZOLE SODIUM 40 MG: 40 TABLET, DELAYED RELEASE ORAL at 06:14

## 2025-09-04 RX ADMIN — CALCITRIOL CAPSULES 0.25 MCG 0.5 MCG: 0.25 CAPSULE ORAL at 12:05

## 2025-09-04 RX ADMIN — BUTALBITAL, ACETAMINOPHEN, AND CAFFEINE 2 TABLET: 325; 50; 40 TABLET ORAL at 01:41

## 2025-09-04 RX ADMIN — METOPROLOL TARTRATE 12.5 MG: 25 TABLET, FILM COATED ORAL at 12:08

## 2025-09-04 RX ADMIN — HYDRALAZINE HYDROCHLORIDE 25 MG: 25 TABLET ORAL at 12:06

## 2025-09-04 RX ADMIN — ISOSORBIDE MONONITRATE 15 MG: 30 TABLET, EXTENDED RELEASE ORAL at 12:04

## 2025-09-04 RX ADMIN — COLLAGENASE SANTYL: 250 OINTMENT TOPICAL at 14:32

## 2025-09-04 RX ADMIN — SEVELAMER CARBONATE 800 MG: 800 TABLET, FILM COATED ORAL at 12:06

## 2025-09-04 RX ADMIN — OXYCODONE HYDROCHLORIDE AND ACETAMINOPHEN 1 TABLET: 7.5; 325 TABLET ORAL at 12:01

## 2025-09-04 ASSESSMENT — PAIN DESCRIPTION - ORIENTATION
ORIENTATION: LEFT;RIGHT
ORIENTATION: LEFT;ANTERIOR

## 2025-09-04 ASSESSMENT — PAIN DESCRIPTION - LOCATION
LOCATION: HEAD
LOCATION: KNEE;FOOT;BACK
LOCATION: KNEE;FOOT;BACK

## 2025-09-04 ASSESSMENT — PAIN DESCRIPTION - DESCRIPTORS
DESCRIPTORS: ACHING
DESCRIPTORS: SHARP

## 2025-09-04 ASSESSMENT — PAIN - FUNCTIONAL ASSESSMENT
PAIN_FUNCTIONAL_ASSESSMENT: 0-10
PAIN_FUNCTIONAL_ASSESSMENT: 0-10

## 2025-09-04 ASSESSMENT — PAIN SCALES - GENERAL
PAINLEVEL_OUTOF10: 6
PAINLEVEL_OUTOF10: 7
PAINLEVEL_OUTOF10: 7

## 2025-09-07 LAB
MICROORGANISM SPEC CULT: NORMAL
MICROORGANISM SPEC CULT: NORMAL
SERVICE CMNT-IMP: NORMAL
SERVICE CMNT-IMP: NORMAL
SPECIMEN DESCRIPTION: NORMAL
SPECIMEN DESCRIPTION: NORMAL

## (undated) DEVICE — YANKAUER,FLEXIBLE HANDLE,REGLR CAPACITY: Brand: MEDLINE INDUSTRIES, INC.

## (undated) DEVICE — GLOVE SURG SZ 65 THK91MIL LTX FREE SYN POLYISOPRENE

## (undated) DEVICE — ELECTRODE ES AD CRDLSS PT RET REM POLYHESIVE

## (undated) DEVICE — CATH BLLN ANGIO 2.75X10MM SC EUPHORA RX

## (undated) DEVICE — PREMIUM WET SKIN PREP TRAY: Brand: MEDLINE INDUSTRIES, INC.

## (undated) DEVICE — TUBING, SUCTION, 9/32" X 10', STRAIGHT: Brand: MEDLINE

## (undated) DEVICE — SYRINGE IRRIG 60ML SFT PLIABLE BLB EZ TO GRP 1 HND USE W/

## (undated) DEVICE — GLOVE SURG SZ 8 L12IN THK75MIL DK GRN LTX FREE

## (undated) DEVICE — CATHETER DIAG AD 4FR L100CM STD NYL JUDKINS R 4 TRULUMEN

## (undated) DEVICE — SOLUTION PREP POVIDONE IOD FOR SKIN MUCOUS MEM PRIOR TO

## (undated) DEVICE — SOLUTION IV 1000ML 0.9% SOD CHL FOR IRRIG PLAS CONT

## (undated) DEVICE — LINER,SEMI-RIGID,3000CC,50EA/CS: Brand: MEDLINE

## (undated) DEVICE — DEVICE INFLATION KT 60031246] ANGIODYNAMICS INC]

## (undated) DEVICE — ANESTHESIA CIRCUIT ADULT-LF: Brand: MEDLINE INDUSTRIES, INC.

## (undated) DEVICE — Z DISCONTINUED USE 2271144 DRAIN SURG W0.25XL18IN FLAT GRAV FOR OPN WND PENROSE

## (undated) DEVICE — PAD,ABDOMINAL,5"X9",ST,LF,25/BX: Brand: MEDLINE INDUSTRIES, INC.

## (undated) DEVICE — GOWN,SIRUS,FABRNF,RAGLAN,XL,ST,28/CS: Brand: MEDLINE

## (undated) DEVICE — PENCIL ES CRD L10FT HND SWCHING ROCK SWCH W/ EDGE COAT BLDE

## (undated) DEVICE — SPONGE LAP W18XL18IN WHT COT 4 PLY FLD STRUNG RADPQ DISP ST

## (undated) DEVICE — SUTURE VCRL SZ 3-0 L27IN ABSRB UD L17MM RB-1 1/2 CIR J215H

## (undated) DEVICE — GOWN,SIRUS,FABRNF,RAGLAN,L,ST,30/CS: Brand: MEDLINE

## (undated) DEVICE — TRAY PREP DRY W/ PREM GLV 2 APPL 6 SPNG 2 UNDPD 1 OVERWRAP

## (undated) DEVICE — SPONGE GZ W4XL8IN COT WVN 12 PLY

## (undated) DEVICE — GLOVE SURG SZ 6 THK91MIL LTX FREE SYN POLYISOPRENE ANTI

## (undated) DEVICE — NEEDLE HYPO 23GA L1.5IN TURQ POLYPR HUB S STL THN WALL IM

## (undated) DEVICE — SPONGE LAP W18XL18IN WHT COT 4 PLY FLD STRUNG RADPQ DISP ST 2 PER PACK

## (undated) DEVICE — CATHETER ANGIO 4FR L100CM S STL NYL JL4 3 SEG BRAID SFT

## (undated) DEVICE — SOLUTION IV IRRIG WATER 1000ML POUR BRL 2F7114

## (undated) DEVICE — GUIDEWIRE VASC L182CM DIA0.014IN 3CM RADIOPACITY J TIP MOD

## (undated) DEVICE — GLOVE ORANGE PI 8   MSG9080

## (undated) DEVICE — DRAPE SHEET ULTRAGARD: Brand: MEDLINE

## (undated) DEVICE — PINNACLE INTRODUCER SHEATH: Brand: PINNACLE

## (undated) DEVICE — TOWEL,OR,DSP,ST,BLUE,STD,6/PK,12PK/CS: Brand: MEDLINE

## (undated) DEVICE — SYRINGE, LUER LOCK, 10ML: Brand: MEDLINE

## (undated) DEVICE — VAC WHITEFOAM DRESSING SMALL FOAM ONLY: Brand: V.A.C.®

## (undated) DEVICE — SYRINGE 10ML HYPO W/O NDL W/ LUER SLP TP

## (undated) DEVICE — SYRINGE MED 10ML LUERLOCK TIP W/O SFTY DISP

## (undated) DEVICE — FAN SPRAY KIT: Brand: PULSAVAC®

## (undated) DEVICE — PACK,BASIC,IX: Brand: MEDLINE

## (undated) DEVICE — CORD ES L15FT PT RET REUSE VALLEYLAB REM

## (undated) DEVICE — INTENDED FOR TISSUE SEPARATION, AND OTHER PROCEDURES THAT REQUIRE A SHARP SURGICAL BLADE TO PUNCTURE OR CUT.: Brand: BARD-PARKER ® STAINLESS STEEL BLADES

## (undated) DEVICE — LINER SUCT CANSTR 1500CC SEMI RIG W/ POR HYDROPHOBIC SHUT

## (undated) DEVICE — ENDOSCOPY KIT: Brand: MEDLINE INDUSTRIES, INC.

## (undated) DEVICE — FORCEPS BX L240CM JAW DIA2.8MM L CAP W/ NDL MIC MESH TOOTH

## (undated) DEVICE — STANDARD HYPODERMIC NEEDLE,POLYPROPYLENE HUB: Brand: MONOJECT

## (undated) DEVICE — BANDAGE,ELASTIC,ESMARK,STERILE,4"X9',LF: Brand: MEDLINE

## (undated) DEVICE — GLOVE SURG SZ 65 CRM LTX FREE POLYISOPRENE POLYMER BEAD ANTI

## (undated) DEVICE — CATH BLLN ANGIO 3X12MM NC EUPHORIA RX

## (undated) DEVICE — APPLICATOR MEDICATED 26 CC SOLUTION HI LT ORNG CHLORAPREP

## (undated) DEVICE — BANDAGE COMPR W4INXL5YD WHT BGE POLY COT M E WRP WV HK AND

## (undated) DEVICE — THIN OFFSET (9.0 X 0.38 X 25.0MM)

## (undated) DEVICE — GLOVE SURG SZ 7 L12IN FNGR THK87MIL WHT LTX FREE

## (undated) DEVICE — Device

## (undated) DEVICE — SUTURE VCRL SZ 4-0 L27IN ABSRB UD L17MM RB-1 1/2 CIR J214H

## (undated) DEVICE — NEEDLE HYPO 25GA L1.5IN BLU POLYPR HUB S STL REG BVL STR

## (undated) DEVICE — CATH BLLN ANGIO 3X15MM SC EUPHORA RX

## (undated) DEVICE — DRESSING PETRO W3XL3IN OIL EMUL N ADH GZ KNIT IMPREG CELOS

## (undated) DEVICE — KIT MICROINTRODUCER 4FR ECHOGENIC NDL L7CM 21GA STIFF COAX

## (undated) DEVICE — GAUZE,PACKING STRIP,IODOFORM,1/2"X5YD,ST: Brand: CURAD

## (undated) DEVICE — BANDAGE,SELF ADHRNT,COFLEX,4"X5YD,STRL: Brand: COLABEL

## (undated) DEVICE — SUTURE CHROMIC GUT SZ 2-0 L27IN ABSRB BRN L26MM SH 1/2 CIR G123H

## (undated) DEVICE — GLOVE SURG SZ 7 CRM LTX FREE POLYISOPRENE POLYMER BEAD ANTI

## (undated) DEVICE — GOWN,SIRUS,POLYRNF,BRTHSLV,XLN/XL,20/CS: Brand: MEDLINE

## (undated) DEVICE — UNDERPANTS MAT L/XL KNIT SEAMLESS CLR CODE WAISTBAND

## (undated) DEVICE — GAUZE,SPONGE,4"X4",16PLY,XRAY,STRL,LF: Brand: MEDLINE

## (undated) DEVICE — CATHETER GUID 6FR L100CM DIA0.071IN NYL SHFT JL3.5 W/O SIDE

## (undated) DEVICE — COUNTER NDL 30 COUNT FOAM STRP SGL MAG

## (undated) DEVICE — MARKER,SKIN,WI/RULER AND LABELS: Brand: MEDLINE

## (undated) DEVICE — ANGIOGRAPHY KIT CUST MANIFOLD

## (undated) DEVICE — SUTURE ETHILON SZ 2-0 L30IN NONABSORBABLE BLK L36MM FSLX 3/8 1674H

## (undated) DEVICE — STERILE POLYISOPRENE POWDER-FREE SURGICAL GLOVES: Brand: PROTEXIS

## (undated) DEVICE — INFLATION DEVICE: Brand: ENCORE™ 26

## (undated) DEVICE — SUTURE VICRYL SZ 2-0 L27IN ABSRB VLT L26MM SH 1/2 CIR J317H

## (undated) DEVICE — LEGGINGS, PAIR, CLEAR, STERILE: Brand: MEDLINE

## (undated) DEVICE — Z DISCONTINUED USE 2220295 SUTURE VICRYL SZ 0 L18IN ABSRB UD L36MM CT-1 1/2 CIR J840D

## (undated) DEVICE — SHEET,DRAPE,53X77,STERILE: Brand: MEDLINE

## (undated) DEVICE — PINNACLE R/O II INTRODUCER SHEATH WITH RADIOPAQUE MARKER: Brand: PINNACLE

## (undated) DEVICE — GLOVE ORANGE PI 7 1/2   MSG9075

## (undated) DEVICE — CONTAINER,SPECIMEN,OR STERILE,4OZ: Brand: MEDLINE

## (undated) DEVICE — GLOVE SURG SZ 65 L12IN FNGR THK79MIL GRN LTX FREE

## (undated) DEVICE — GLOVE SURG SZ 6 L12IN FNGR THK87MIL WHT LTX FREE

## (undated) DEVICE — Z DISCONTINUED (USE MFG CAT MVABO)  TUBING GAS SAMPLING STD 6.5 FT FEMALE CONN SMRT CAPNOLINE

## (undated) DEVICE — Z DISCONTINUED USE 2158151 CATHETER GUID EXTRA BACKUP 3.5 0.070IN 6FR 100CM VISTA BRITE TIP

## (undated) DEVICE — COPILOT BLEEDBACK CONTROL VALVE: Brand: COPILOT

## (undated) DEVICE — INTENDED FOR TISSUE SEPARATION, AND OTHER PROCEDURES THAT REQUIRE A SHARP SURGICAL BLADE TO PUNCTURE OR CUT.: Brand: BARD-PARKER ® CARBON RIB-BACK BLADES

## (undated) DEVICE — DRAPE,EXTREMITY,89X128,STERILE: Brand: MEDLINE

## (undated) DEVICE — GUIDEWIRE VASC L150CM DIA0.035IN FLX END L7CM J 3MM PTFE

## (undated) DEVICE — TUBE CULTURE LF UNIFORM BOTTOM STER

## (undated) DEVICE — GUIDEWIRE VASC L150CM DIA0.035IN FLX TIP L7CM PTFE STR FIX

## (undated) DEVICE — BANDAGE,GAUZE,BULKEE II,4.5"X4.1YD,STRL: Brand: MEDLINE

## (undated) DEVICE — 1010 S-DRAPE TOWEL DRAPE 10/BX: Brand: STERI-DRAPE™

## (undated) DEVICE — PACK,BASIC,SIRUS,V: Brand: MEDLINE

## (undated) DEVICE — PRECISION THIN, OFFSET (5.5 X 0.38 X 25.0MM)

## (undated) DEVICE — SUTURE ETHLN SZ 4-0 L18IN NONABSORBABLE BLK L19MM PS-2 3/8 1667H

## (undated) DEVICE — ENDOSCOPIC KIT 1.1+ OP4 CA DE 2 GWN AAMI LEVEL 3

## (undated) DEVICE — CANISTER NEG PRSS 500ML W/ GEL TBNG CLMP CONN V.A.C ATS

## (undated) DEVICE — SUTURE ETHLN SZ 3-0 L18IN NONABSORBABLE BLK FS-1 L24MM 3/8 663H

## (undated) DEVICE — GOWN,ECLIPSE,POLYRNF,BRTHSLV,XL,30/CS: Brand: MEDLINE

## (undated) DEVICE — TROCAR ENDOSCP L150MM DIA12MM BLDELSS OBT RADLUC STBL SL

## (undated) DEVICE — MARKER SURG SKIN UTIL REGULAR/FINE 2 TIP W/ RUL AND 9 LBL

## (undated) DEVICE — GLOVE SURG SZ 75 L12IN FNGR THK87MIL WHT LTX FREE

## (undated) DEVICE — WIRE GUID DIAG PTFE COAT FIX COR STD XIBLE J TIP 7MM

## (undated) DEVICE — GLOVE SURG SZ 6 CRM LTX FREE POLYISOPRENE POLYMER BEAD ANTI

## (undated) DEVICE — SUTURE CHROMIC GUT SZ 3-0 L27IN ABSRB BRN L26MM SH 1/2 CIR G122H

## (undated) DEVICE — PADDING,UNDERCAST,COTTON, 4"X4YD STERILE: Brand: MEDLINE

## (undated) DEVICE — MASTISOL ADHESIVE LIQ 2/3ML

## (undated) DEVICE — POSITIONER HD AD W4.5XH8XL9IN HIGHLY RESILIENT FOAM CMFRT

## (undated) DEVICE — Z INACTIVE USE 2863041 SPONGE GZ W4XL4IN 100% COT 16 PLY RADPQ HIGHLY ABSRB

## (undated) DEVICE — PRECISION THIN (9.0 X 0.38 X 25.0MM)

## (undated) DEVICE — SYRINGE 20ML LL S/C 50

## (undated) DEVICE — SUTURE PERMAHAND SZ 2-0 L18IN NONABSORBABLE BLK L26MM FS 685G

## (undated) DEVICE — SUTURE VICRYL SZ 3-0 L27IN ABSRB UD L26MM CT-2 1/2 CIR J232H

## (undated) DEVICE — ZIMMER® STERILE DISPOSABLE TOURNIQUET CUFF WITH PLC, DUAL PORT, SINGLE BLADDER, 18 IN. (46 CM)

## (undated) DEVICE — SUTURE NONABSORBABLE MONOFILAMENT 4-0 FS-2 18 IN ETHILON 662H

## (undated) DEVICE — SUTURE NONABSORBABLE MONOFILAMENT 4-0 PS-2 18 IN BLK ETHILON 1667G

## (undated) DEVICE — DRESSING,GAUZE,XEROFORM,CURAD,1"X8",ST: Brand: CURAD

## (undated) DEVICE — KIT NEG PRSS DSG M W4.92XH1.3XL7.09IN SIL POLYUR FOAM

## (undated) DEVICE — HI-TORQUE VERSACORE MODIFIED J GUIDE WIRE SYSTEM 145 CM: Brand: HI-TORQUE VERSACORE

## (undated) DEVICE — COUNTER NDL 60 COUNT FOAM STRP SGL MAG

## (undated) DEVICE — SUTURE VICRYL SZ 3-0 L18IN ABSRB UD L26MM SH 1/2 CIR J864D

## (undated) DEVICE — RUNTHROUGH NS EXTRA FLOPPY PTCA GUIDEWIRE: Brand: RUNTHROUGH

## (undated) DEVICE — GUIDEWIRE VASC L175CM POLYMER HYDRPHLC FLAT COWIRE DSGN

## (undated) DEVICE — GUIDEWIRE VASC L75CM DIA0.035IN TIP L7CM PTFE COAT S STL

## (undated) DEVICE — TROCAR ENDOSCP L100MM DIA12MM BLDELSS OBT RADLUC STBL SL

## (undated) DEVICE — SOLUTION PREP PAINT POV IOD FOR SKIN MUCOUS MEM

## (undated) DEVICE — SHOE POSTOP M MAN 9-11 UNIV FOAM TRICOT SEMI FLX SKID

## (undated) DEVICE — CATH BLLN ANGIO 3.50X12MM NC EUPHORIA RX

## (undated) DEVICE — GLOVE SURG SZ 7 L12IN FNGR THK79MIL GRN LTX FREE